# Patient Record
Sex: FEMALE | NOT HISPANIC OR LATINO | Employment: OTHER | URBAN - METROPOLITAN AREA
[De-identification: names, ages, dates, MRNs, and addresses within clinical notes are randomized per-mention and may not be internally consistent; named-entity substitution may affect disease eponyms.]

---

## 2018-12-06 ENCOUNTER — HOSPITAL ENCOUNTER (EMERGENCY)
Facility: HOSPITAL | Age: 63
Discharge: HOME | End: 2018-12-07
Attending: EMERGENCY MEDICINE | Admitting: EMERGENCY MEDICINE
Payer: COMMERCIAL

## 2018-12-06 VITALS
HEIGHT: 64 IN | WEIGHT: 157.41 LBS | SYSTOLIC BLOOD PRESSURE: 131 MMHG | DIASTOLIC BLOOD PRESSURE: 80 MMHG | BODY MASS INDEX: 26.87 KG/M2 | OXYGEN SATURATION: 97 % | TEMPERATURE: 97.9 F | RESPIRATION RATE: 15 BRPM | HEART RATE: 85 BPM

## 2018-12-06 DIAGNOSIS — F25.9 SCHIZOAFFECTIVE DISORDER (HCC): Primary | ICD-10-CM

## 2018-12-06 LAB
AMPHETAMINES SERPL QL SCN: NEGATIVE
BARBITURATES UR QL: NEGATIVE
BENZODIAZ UR QL: NEGATIVE
COCAINE UR QL: NEGATIVE
ETHANOL EXG-MCNC: 0 MG/DL
METHADONE UR QL: NEGATIVE
OPIATES UR QL SCN: NEGATIVE
PCP UR QL: NEGATIVE
THC UR QL: NEGATIVE

## 2018-12-06 PROCEDURE — 82075 ASSAY OF BREATH ETHANOL: CPT | Performed by: EMERGENCY MEDICINE

## 2018-12-06 PROCEDURE — 80307 DRUG TEST PRSMV CHEM ANLYZR: CPT | Performed by: EMERGENCY MEDICINE

## 2018-12-06 PROCEDURE — 99284 EMERGENCY DEPT VISIT MOD MDM: CPT

## 2018-12-06 RX ORDER — LEVALBUTEROL INHALATION SOLUTION 0.63 MG/3ML
0.63 SOLUTION RESPIRATORY (INHALATION) ONCE
Status: COMPLETED | OUTPATIENT
Start: 2018-12-06 | End: 2018-12-06

## 2018-12-06 RX ORDER — LEVOTHYROXINE SODIUM 0.12 MG/1
125 TABLET ORAL DAILY
COMMUNITY
End: 2019-03-21 | Stop reason: SDUPTHER

## 2018-12-06 RX ADMIN — LEVALBUTEROL HYDROCHLORIDE 0.63 MG: 0.63 SOLUTION RESPIRATORY (INHALATION) at 15:55

## 2018-12-06 NOTE — ED NOTES
Pt states she "saw two  that she asked to come with her here as they were polite and friendly but didn't and a red haired lady from "[facility]" were there when she was transported here " she states she has "3, 4 psychological clearances" and her providers "would be willing to come out about this investigator shelby told her so"        Elmira Alberto  12/06/18 0445

## 2018-12-06 NOTE — ED NOTES
Tech assigned for one to one observation for patient safety  I introduced myself and explained my role in the patients care team   Pt appears sitting in bed quietly resting  Pt does not appear in distress at this time  Pt is on 3L oxygen therapy  Pt provided urine specimen  Pt is changed into paper scrubs  Will continue to monitor         Alberta Suero  12/06/18 200 Antony Méndez  12/06/18 9028

## 2018-12-06 NOTE — ED NOTES
Pt requests she have her "breathing treatment and bloodwork while I am here, so that I can stay on track with that"       Valerie Mendoza  12/06/18 2084

## 2018-12-06 NOTE — ED NOTES
Pt is talking extensively about her care team  Pt is frequently referring to "Odessa Thomas and Dr Bhumika Perez " Pt states she "prefers to be called "Katty Bowles" but people do not listen"  States she "has other allies like Bird Viatle" "[employer] deleted I suspect that phone number out of my phone because of not wanting me to talk to her" "it is my right to talk about issues" Pt expresses she prefers to have the curtain kept open           Sandy Duke  12/06/18 3706

## 2018-12-06 NOTE — ED PROVIDER NOTES
History  Chief Complaint   Patient presents with    Psychiatric Evaluation     Pt brought in from [facility], where garrick would like to petition a 302  States pt is wunwilling tot amrita meds, is locking herself in her room with a room mate, making sexual comments towards staff, and is exp severe hallucinations  Pt  Was brought in from [facility], personal care facility on a possible 302  She has a hx of schizoaffective disorder and according to one staff member, she refused to take a medication today and locked herself in the bathroom today     She has not threatened anyone or herself  She is not a harm to herself or others  She denies locking herself in a room  She also says that she questions one pill that they give her because she wasn't supposed to be on it  Prior to Admission Medications   Prescriptions Last Dose Informant Patient Reported? Taking? EPINEPHrine (EPIPEN JR) 0 15 mg/0 3 mL SOAJ   Yes Yes   Sig: Inject 0 15 mg into the shoulder, thigh, or buttocks once as needed for anaphylaxis  As needed for allergic reaction   OXYGEN-HELIUM IN   Yes Yes   Sig: Inhale 3 L  QUEtiapine (SEROquel) 100 mg tablet   Yes Yes   Sig: Take 50 mg by mouth daily at bedtime     acetaminophen 650 MG TABS   No Yes   Sig: Take 1 tablet (650 mg total) by mouth every 6 (six) hours as needed for mild pain  fluticasone-salmeterol (ADVAIR) 500-50 mcg/dose inhaler   Yes Yes   Sig: Inhale 1 puff 2 (two) times a day Rinse mouth after use  levalbuterol (XOPENEX) 1 25 mg/3 mL nebulizer solution   Yes Yes   Sig: Take 1 ampule by nebulization every 6 (six) hours as needed for wheezing  levothyroxine 125 mcg tablet   Yes Yes   Sig: Take 125 mcg by mouth daily   nicotine (NICODERM CQ) 14 mg/24hr TD 24 hr patch   No Yes   Sig: Please use the 14 mg patch daily for 2 weeks then decrease to the 7 mg patch daily for 2 weeks     predniSONE 10 mg tablet   No Yes   Si mg PO Qdaily for 3 days then 20 mg daily for 4 days then 10 mg daily for 4 days then 5 mg daily for 4 days  theophylline (JEF-24) 200 MG 24 hr capsule   Yes Yes   Sig: Take 200 mg by mouth 2 (two) times a day  Facility-Administered Medications: None       Past Medical History:   Diagnosis Date    Anxiety     COPD [facility]chronic obstructive pulmonary disease) ([facility] [address] )     Depression     GERD (gastroesophageal reflux disease)     Schizoaffective disorder ([facility])        History reviewed  No pertinent surgical history  History reviewed  No pertinent family history  I have reviewed and agree with the history as documented  Social History   Substance Use Topics    Smoking status: Current Every Day Smoker    Smokeless tobacco: Never Used    Alcohol use No        Review of Systems   Constitutional: Negative for appetite change, fatigue and fever  HENT: Negative for rhinorrhea and sore throat  Respiratory: Negative for cough, shortness of breath and wheezing  Cardiovascular: Negative for chest pain and leg swelling  Gastrointestinal: Negative for abdominal pain, diarrhea and vomiting  Genitourinary: Negative for dysuria and flank pain  Musculoskeletal: Negative for back pain and neck pain  Skin: Negative for rash  Neurological: Negative for syncope and headaches  Psychiatric/Behavioral:        No SI/HI       Physical Exam  Physical Exam   Constitutional: She is oriented to person, place, and time  She appears well-developed and well-nourished  HENT:   Head: Normocephalic and atraumatic  Neck: Normal range of motion  Neck supple  Cardiovascular: Normal rate and regular rhythm  Pulmonary/Chest: Effort normal and breath sounds normal    Abdominal: Soft  There is no tenderness  Musculoskeletal: Normal range of motion  Neurological: She is alert and oriented to person, place, and time  Skin: Skin is warm and dry  Psychiatric:   No SI/HI   Nursing note and vitals reviewed        Vital Signs  ED Triage Vitals Temperature Pulse Respirations Blood Pressure SpO2   12/06/18 1416 12/06/18 1408 12/06/18 1408 12/06/18 1408 12/06/18 1408   97 9 °F (36 6 °C) 99 20 145/87 96 %      Temp Source Heart Rate Source Patient Position - Orthostatic VS BP Location FiO2 (%)   12/06/18 1416 12/06/18 1408 12/06/18 1408 12/06/18 1408 --   Oral Monitor Lying Right arm       Pain Score       12/06/18 1408       No Pain           Vitals:    12/06/18 1408 12/06/18 1756   BP: 145/87 131/80   Pulse: 99 85   Patient Position - Orthostatic VS: Lying        Visual Acuity      ED Medications  Medications   levalbuterol (XOPENEX) inhalation solution 0 63 mg (0 63 mg Nebulization Given 12/6/18 1555)       Diagnostic Studies  Results Reviewed     Procedure Component Value Units Date/Time    Rapid drug screen, urine [[id number]]  (Normal) Collected:  12/06/18 1511    Lab Status:  Final result Specimen:  Urine from Urine, Other Updated:  12/06/18 1527     Amph/Meth UR Negative     Barbiturate Ur Negative     Benzodiazepine Urine Negative     Cocaine Urine Negative     Methadone Urine Negative     Opiate Urine Negative     PCP Ur Negative     THC Urine Negative    Narrative:         FOR MEDICAL PURPOSES ONLY  IF CONFIRMATION NEEDED PLEASE CONTACT THE LAB WITHIN 5 DAYS      Drug Screen Cutoff Levels:  AMPHETAMINE/METHAMPHETAMINES  1000 ng/mL  BARBITURATES     200 ng/mL  BENZODIAZEPINES     200 ng/mL  COCAINE      300 ng/mL  METHADONE      300 ng/mL  OPIATES      300 ng/mL  PHENCYCLIDINE     25 ng/mL  THC       50 ng/mL    POCT alcohol breath test [[id number]]  (Normal) Resulted:  12/06/18 1432    Lab Status:  Final result Updated:  12/06/18 1432     EXTBreath Alcohol 0 00                 No orders to display              Procedures  Procedures       Phone Contacts  ED Phone Contact    ED Course                               MDM  Number of Diagnoses or Management Options  Schizoaffective disorder [facility]):      Amount and/or Complexity of Data Reviewed  Clinical lab tests: ordered and reviewed    Risk of Complications, Morbidity, and/or Mortality  Presenting problems: moderate  General comments: Denied the 302, pt  Is not a threat to herself or anyone else  CritCare Time    Disposition  Final diagnoses:   Schizoaffective disorder ([facility] [address] )     Time reflects when diagnosis was documented in both MDM as applicable and the Disposition within this note     Time User Action Codes Description Comment    12/6/2018  7:14 PM Karie Chavez 6 [F25 9] Schizoaffective disorder [facility])       ED Disposition     ED Disposition Condition Comment    Discharge  Nolberto Galvin discharge to home/self care  Condition at discharge: Stable        MD Documentation      Most Recent Value   Sending MD Dr Belen Willingham up With Specialties Details Why [address], [address]   [address] [place] [address] [address] [phone]            Patient's Medications   Discharge Prescriptions    No medications on file     No discharge procedures on file      ED Provider  Electronically Signed by           Sharif Islas MD  12/06/18 2491

## 2018-12-06 NOTE — ED NOTES
"they did this on memorial day, I was 302'd sat on a one on one at [address], but I was tired and I do not know the area   It was memorial day I was looking really nice saying my prayers and all of a sudden I was handcuffed by two individuals who were not even police officers driving a silver vehicle" states "it is disturbing to me that they feel I would hurt myself or others " Says it makes her wonder "what are they doing that they need to ask if I am okay?" "i do not like doors closed, it gets very hot" "my roommate will say that unless they are coerced otherwise, I do not like living behind locked doors"           Ricky Honeycutt  12/06/18 1501

## 2018-12-06 NOTE — ED NOTES
Pt is explaining how she believes her care providers are taking her medications, after she refuses them, to give to other people or take for themselves  She believes the care facility is charging her for medication that they are giving to other patients  As a one to one sitter I have not engaged in questioning her beliefs or asking any further details  Statements patient has been making have been entirely unprovoked and of free will       Natalie Moreno Headen  12/06/18 6410

## 2018-12-06 NOTE — ED NOTES
Pt belongings collected and bagged outside of pt room  Pt has jeans, underwear, sneakers, a zipup sweatshirt  She also has portable oxygen and a pink purse (contents not yet examined)  Pt has 2 rings, a bracelet, and 4 earrings, and 2 hair ties  Pt changed to paper scrubs per protocol  Pt BAT  000  Not able to use the restroom at this time, she has a soda        Lencho Shannon  12/06/18 2405

## 2018-12-06 NOTE — ED NOTES
Patient appears sitting in bed talking about how she perceives events in her life  Pt is still eating dinner provided  Pt does not appear in distress at this time  Will continue to monitor       Francesco Milner  12/06/18 8517

## 2018-12-06 NOTE — ED NOTES
Pt continues to explain in detail about people she knows and how she knows them       Kishor Walls  12/06/18 4401

## 2018-12-06 NOTE — ED NOTES
Pt ambulated to and from bathroom, with a steady gate, unassisted  Pt is now laying quietly resting in bed  Pt does not appear in distress at this time  Will continue to monitor         Jeannette Ramirez  12/06/18 3038

## 2018-12-07 NOTE — ED NOTES
[facility] called to say they will be in the ED to  pt around 1:00 AM due to another emergency transport

## 2018-12-07 NOTE — ED NOTES
Pt is sitting in a chair next to the bed  Patient was given fresh water and is sitting, resting  Pt is on 3L oxygen therapy and does not appear to be in distress at this time  Will continue to monitor       Cyril Hayes  12/06/18 6544

## 2018-12-07 NOTE — ED NOTES
Pt presents to the ED on a 302 petitioned by a nurse at her personal care home stating that pt is refusing to take her meds, is paranoid and is responding to internal stimuli  Pt denies any suicidal or homicidal ideations, nor any auditory or visual hallucinations at this time  Pt presents in bright spirits, alert and oriented and able to answer all questions appropriately  Pt states that she does take Seroquel and klonopin as prescribed and is O2 dependent, but is refusing to take a new med given to her that she believes is Prozac because she feels it is a respiratory depressant and her CRNP, Juan Daniel Pabon did not discuss giving her this new med  Pt reports good sleep and a healthy appetite  She reports getting along well with staff and residents alike  Pt reports 1 previous hospitalization at [facility] a few yrs ago but has no current psychiatry or therapy Tx  Pt notes in response to comments in the 302 petition about her talking out loud, that she does often talk along with the TV when game shows are on  Pt did state that she had some surgery in 1997 and at that time a tracker of some sort was implanted into her right forearm  However, she declined to elaborate  CW then spoke with Orville Gomez, the nurse who petitioned the [phone], and he repeated that he believes pt needs hospitalization due to her alleged refusal to take her meds  Orville Gomez did not discuss any other items noted in the 302 petition  He did state that pt can return to the home but they will not provide transportation  CW discussed this case with Dr Lenard Rico who is in agreement with denying the [phone] petition and D/C'ing pt home

## 2018-12-07 NOTE — DISCHARGE INSTRUCTIONS
Psychotic Disorder   WHAT YOU NEED TO KNOW:   A psychotic disorder is a medical condition that causes hallucinations and delusions  Hallucinations are seeing, hearing, tasting, or feeling things that are not real  Delusions are beliefs that something is real, true, or right when it is not  These false beliefs do not go away even if there is proof that they are not true  You may believe someone is spying on you, after you, or controlling your mind  You may also believe there is something wrong with how your body works  Schizophrenia and schizoaffective disorder are examples of psychotic disorders  DISCHARGE INSTRUCTIONS:   Call 911 if:   · You feel like you could harm yourself or someone else  Contact your healthcare provider if:   · Your symptoms do not improve  · You cannot make it to your next appointment  · You have new symptoms  · You have questions or concerns about your condition or care  Medicines  may be given to decrease your symptoms  You may need 1 or more medicines  You may need to take your medicine for several weeks before you begin to feel better  Tell your healthcare provider about any side effects or problems you have with your medicines  The type or amount of medicine may need to be changed  Get support: It may be difficult to cope with your illness  You may feel lonely, anxious, or depressed  It may help to join a support group  A support group lets you talk with others who have a mental illness  For information and more support visit:  · [facility]  [address] , [address] [place] [address]  Phone: [phone]  Phone: [phone]  Web Address: http://L[employer]oodle/  Sitemasher  Self-care:   · Do not drink alcohol or use illegal drugs  Alcohol and illegal drugs can make your symptoms worse  Ask your healthcare provider for information if you currently drink alcohol or use illegal drugs and need help to quit  · Do not smoke    Nicotine and other chemicals in cigarettes and cigars can cause lung damage  They can also decrease how well your medicine works  Ask your healthcare provider for information if you currently smoke and need help to quit  E-cigarettes or smokeless tobacco still contain nicotine  Talk to your healthcare provider before you use these products  · Exercise regularly  Exercise can help improve your mood and decrease symptoms  Ask about the best exercise plan for you  · Manage your stress  Stress can make your condition worse  Ask your healthcare provider for more information about practicing mindfulness and deep breathing exercises to help decrease your stress  You may learn other ways to manage stress during therapy  Follow up with your healthcare provider as directed:  Write down your questions so you remember to ask them during your visits  © 2017 [facility] Information is for End User's use only and may not be sold, redistributed or otherwise used for commercial purposes  All illustrations and images included in CareNotes® are the copyrighted property of [employer]  or Johnathan Gomez  The above information is an  only  It is not intended as medical advice for individual conditions or treatments  Talk to your doctor, nurse or pharmacist before following any medical regimen to see if it is safe and effective for you

## 2018-12-07 NOTE — ED NOTES
On a 1:1 with patient, received report from [facility]  Pt is showing no signs of distress, will continue to monitor        Nel Calles  12/06/18 2042

## 2019-01-01 ENCOUNTER — APPOINTMENT (INPATIENT)
Dept: CT IMAGING | Facility: HOSPITAL | Age: 64
DRG: 750 | End: 2019-01-01
Payer: COMMERCIAL

## 2019-01-01 ENCOUNTER — APPOINTMENT (INPATIENT)
Dept: RADIOLOGY | Facility: HOSPITAL | Age: 64
DRG: 750 | End: 2019-01-01
Payer: COMMERCIAL

## 2019-01-01 LAB
ATRIAL RATE: 84 BPM
ATRIAL RATE: 90 BPM
BASOPHILS # BLD AUTO: 0.1 THOUSANDS/ΜL (ref 0–0.1)
BASOPHILS NFR BLD AUTO: 1 % (ref 0–1)
BASOPHILS NFR BLD AUTO: 2 % (ref 0–1)
EOSINOPHIL # BLD AUTO: 0.1 THOUSAND/ΜL (ref 0–0.4)
EOSINOPHIL # BLD AUTO: 0.2 THOUSAND/ΜL (ref 0–0.4)
EOSINOPHIL # BLD AUTO: 0.2 THOUSAND/ΜL (ref 0–0.4)
EOSINOPHIL NFR BLD AUTO: 2 % (ref 0–6)
ERYTHROCYTE [DISTWIDTH] IN BLOOD BY AUTOMATED COUNT: 13.6 %
ERYTHROCYTE [DISTWIDTH] IN BLOOD BY AUTOMATED COUNT: 13.7 %
ERYTHROCYTE [DISTWIDTH] IN BLOOD BY AUTOMATED COUNT: 13.7 %
ERYTHROCYTE [DISTWIDTH] IN BLOOD BY AUTOMATED COUNT: 13.9 %
ERYTHROCYTE [DISTWIDTH] IN BLOOD BY AUTOMATED COUNT: 14.1 %
ERYTHROCYTE [DISTWIDTH] IN BLOOD BY AUTOMATED COUNT: 14.2 %
ERYTHROCYTE [DISTWIDTH] IN BLOOD BY AUTOMATED COUNT: 14.3 %
ERYTHROCYTE [DISTWIDTH] IN BLOOD BY AUTOMATED COUNT: 14.5 %
GLUCOSE SERPL-MCNC: 114 MG/DL (ref 65–140)
GLUCOSE SERPL-MCNC: 142 MG/DL (ref 65–140)
GLUCOSE SERPL-MCNC: 78 MG/DL (ref 65–140)
HCT VFR BLD AUTO: 39.7 % (ref 36–46)
HCT VFR BLD AUTO: 41.3 % (ref 36–46)
HCT VFR BLD AUTO: 42.1 % (ref 36–46)
HCT VFR BLD AUTO: 43.3 % (ref 36–46)
HCT VFR BLD AUTO: 43.6 % (ref 36–46)
HCT VFR BLD AUTO: 44.6 % (ref 36–46)
HCT VFR BLD AUTO: 45.5 % (ref 36–46)
HCT VFR BLD AUTO: 46.8 % (ref 36–46)
HGB BLD-MCNC: 13.1 G/DL (ref 12–16)
HGB BLD-MCNC: 13.7 G/DL (ref 12–16)
HGB BLD-MCNC: 13.9 G/DL (ref 12–16)
HGB BLD-MCNC: 14.4 G/DL (ref 12–16)
HGB BLD-MCNC: 14.5 G/DL (ref 12–16)
HGB BLD-MCNC: 14.8 G/DL (ref 12–16)
HGB BLD-MCNC: 15.1 G/DL (ref 12–16)
HGB BLD-MCNC: 15.2 G/DL (ref 12–16)
LYMPHOCYTES # BLD AUTO: 2.2 THOUSANDS/ΜL (ref 0.5–4)
LYMPHOCYTES # BLD AUTO: 2.3 THOUSANDS/ΜL (ref 0.5–4)
LYMPHOCYTES # BLD AUTO: 2.5 THOUSANDS/ΜL (ref 0.5–4)
LYMPHOCYTES # BLD AUTO: 2.8 THOUSANDS/ΜL (ref 0.5–4)
LYMPHOCYTES # BLD AUTO: 2.8 THOUSANDS/ΜL (ref 0.5–4)
LYMPHOCYTES # BLD AUTO: 2.9 THOUSANDS/ΜL (ref 0.5–4)
LYMPHOCYTES NFR BLD AUTO: 28 % (ref 25–45)
LYMPHOCYTES NFR BLD AUTO: 31 % (ref 25–45)
LYMPHOCYTES NFR BLD AUTO: 31 % (ref 25–45)
LYMPHOCYTES NFR BLD AUTO: 34 % (ref 25–45)
LYMPHOCYTES NFR BLD AUTO: 37 % (ref 25–45)
LYMPHOCYTES NFR BLD AUTO: 38 % (ref 25–45)
LYMPHOCYTES NFR BLD AUTO: 39 % (ref 25–45)
LYMPHOCYTES NFR BLD AUTO: 40 % (ref 25–45)
MCH RBC QN AUTO: 29.5 PG (ref 26–34)
MCH RBC QN AUTO: 30 PG (ref 26–34)
MCH RBC QN AUTO: 30.1 PG (ref 26–34)
MCH RBC QN AUTO: 30.3 PG (ref 26–34)
MCH RBC QN AUTO: 30.4 PG (ref 26–34)
MCH RBC QN AUTO: 30.4 PG (ref 26–34)
MCH RBC QN AUTO: 30.7 PG (ref 26–34)
MCH RBC QN AUTO: 30.9 PG (ref 26–34)
MCHC RBC AUTO-ENTMCNC: 32.4 G/DL (ref 31–36)
MCHC RBC AUTO-ENTMCNC: 32.6 G/DL (ref 31–36)
MCHC RBC AUTO-ENTMCNC: 32.9 G/DL (ref 31–36)
MCHC RBC AUTO-ENTMCNC: 33.1 G/DL (ref 31–36)
MCHC RBC AUTO-ENTMCNC: 33.2 G/DL (ref 31–36)
MCHC RBC AUTO-ENTMCNC: 33.2 G/DL (ref 31–36)
MCHC RBC AUTO-ENTMCNC: 33.5 G/DL (ref 31–36)
MCHC RBC AUTO-ENTMCNC: 33.6 G/DL (ref 31–36)
MCV RBC AUTO: 90 FL (ref 80–100)
MCV RBC AUTO: 91 FL (ref 80–100)
MCV RBC AUTO: 91 FL (ref 80–100)
MCV RBC AUTO: 92 FL (ref 80–100)
MCV RBC AUTO: 93 FL (ref 80–100)
MONOCYTES # BLD AUTO: 0.6 THOUSAND/ΜL (ref 0.2–0.9)
MONOCYTES # BLD AUTO: 0.6 THOUSAND/ΜL (ref 0.2–0.9)
MONOCYTES # BLD AUTO: 0.7 THOUSAND/ΜL (ref 0.2–0.9)
MONOCYTES # BLD AUTO: 0.8 THOUSAND/ΜL (ref 0.2–0.9)
MONOCYTES # BLD AUTO: 0.8 THOUSAND/ΜL (ref 0.2–0.9)
MONOCYTES # BLD AUTO: 0.9 THOUSAND/ΜL (ref 0.2–0.9)
MONOCYTES NFR BLD AUTO: 10 % (ref 1–10)
MONOCYTES NFR BLD AUTO: 11 % (ref 1–10)
MONOCYTES NFR BLD AUTO: 8 % (ref 1–10)
MONOCYTES NFR BLD AUTO: 9 % (ref 1–10)
MONOCYTES NFR BLD AUTO: 9 % (ref 1–10)
NEUTROPHILS # BLD AUTO: 3.2 THOUSANDS/ΜL (ref 1.8–7.8)
NEUTROPHILS # BLD AUTO: 3.3 THOUSANDS/ΜL (ref 1.8–7.8)
NEUTROPHILS # BLD AUTO: 3.4 THOUSANDS/ΜL (ref 1.8–7.8)
NEUTROPHILS # BLD AUTO: 3.5 THOUSANDS/ΜL (ref 1.8–7.8)
NEUTROPHILS # BLD AUTO: 3.7 THOUSANDS/ΜL (ref 1.8–7.8)
NEUTROPHILS # BLD AUTO: 3.8 THOUSANDS/ΜL (ref 1.8–7.8)
NEUTROPHILS # BLD AUTO: 4.3 THOUSANDS/ΜL (ref 1.8–7.8)
NEUTROPHILS # BLD AUTO: 5.3 THOUSANDS/ΜL (ref 1.8–7.8)
NEUTS SEG NFR BLD AUTO: 47 % (ref 45–65)
NEUTS SEG NFR BLD AUTO: 48 % (ref 45–65)
NEUTS SEG NFR BLD AUTO: 50 % (ref 45–65)
NEUTS SEG NFR BLD AUTO: 52 % (ref 45–65)
NEUTS SEG NFR BLD AUTO: 53 % (ref 45–65)
NEUTS SEG NFR BLD AUTO: 55 % (ref 45–65)
NEUTS SEG NFR BLD AUTO: 57 % (ref 45–65)
NEUTS SEG NFR BLD AUTO: 58 % (ref 45–65)
P AXIS: 78 DEGREES
P AXIS: 85 DEGREES
PLATELET # BLD AUTO: 241 THOUSANDS/UL (ref 150–450)
PLATELET # BLD AUTO: 250 THOUSANDS/UL (ref 150–450)
PLATELET # BLD AUTO: 252 THOUSANDS/UL (ref 150–450)
PLATELET # BLD AUTO: 255 THOUSANDS/UL (ref 150–450)
PLATELET # BLD AUTO: 259 THOUSANDS/UL (ref 150–450)
PLATELET # BLD AUTO: 282 THOUSANDS/UL (ref 150–450)
PLATELET # BLD AUTO: 305 THOUSANDS/UL (ref 150–450)
PLATELET # BLD AUTO: 313 THOUSANDS/UL (ref 150–450)
PMV BLD AUTO: 10 FL (ref 8.9–12.7)
PMV BLD AUTO: 10.1 FL (ref 8.9–12.7)
PMV BLD AUTO: 10.1 FL (ref 8.9–12.7)
PMV BLD AUTO: 10.3 FL (ref 8.9–12.7)
PMV BLD AUTO: 9.1 FL (ref 8.9–12.7)
PMV BLD AUTO: 9.2 FL (ref 8.9–12.7)
PR INTERVAL: 150 MS
PR INTERVAL: 158 MS
QRS AXIS: -59 DEGREES
QRS AXIS: -60 DEGREES
QRSD INTERVAL: 88 MS
QRSD INTERVAL: 92 MS
QT INTERVAL: 382 MS
QT INTERVAL: 408 MS
QTC INTERVAL: 467 MS
QTC INTERVAL: 482 MS
RBC # BLD AUTO: 4.32 MILLION/UL (ref 4–5.2)
RBC # BLD AUTO: 4.51 MILLION/UL (ref 4–5.2)
RBC # BLD AUTO: 4.66 MILLION/UL (ref 4–5.2)
RBC # BLD AUTO: 4.68 MILLION/UL (ref 4–5.2)
RBC # BLD AUTO: 4.75 MILLION/UL (ref 4–5.2)
RBC # BLD AUTO: 4.87 MILLION/UL (ref 4–5.2)
RBC # BLD AUTO: 5.01 MILLION/UL (ref 4–5.2)
RBC # BLD AUTO: 5.07 MILLION/UL (ref 4–5.2)
T WAVE AXIS: 53 DEGREES
T WAVE AXIS: 59 DEGREES
VENTRICULAR RATE: 84 BPM
VENTRICULAR RATE: 90 BPM
WBC # BLD AUTO: 6.3 THOUSAND/UL (ref 4.5–11)
WBC # BLD AUTO: 6.5 THOUSAND/UL (ref 4.5–11)
WBC # BLD AUTO: 6.6 THOUSAND/UL (ref 4.5–11)
WBC # BLD AUTO: 6.9 THOUSAND/UL (ref 4.5–11)
WBC # BLD AUTO: 7.2 THOUSAND/UL (ref 4.5–11)
WBC # BLD AUTO: 7.4 THOUSAND/UL (ref 4.5–11)
WBC # BLD AUTO: 7.6 THOUSAND/UL (ref 4.5–11)
WBC # BLD AUTO: 9.1 THOUSAND/UL (ref 4.5–11)

## 2019-01-01 PROCEDURE — 99231 SBSQ HOSP IP/OBS SF/LOW 25: CPT | Performed by: PSYCHIATRY & NEUROLOGY

## 2019-01-01 PROCEDURE — 99232 SBSQ HOSP IP/OBS MODERATE 35: CPT | Performed by: NURSE PRACTITIONER

## 2019-01-01 PROCEDURE — 82948 REAGENT STRIP/BLOOD GLUCOSE: CPT

## 2019-01-01 PROCEDURE — 99232 SBSQ HOSP IP/OBS MODERATE 35: CPT | Performed by: FAMILY MEDICINE

## 2019-01-01 PROCEDURE — 85025 COMPLETE CBC W/AUTO DIFF WBC: CPT | Performed by: PSYCHIATRY & NEUROLOGY

## 2019-01-01 PROCEDURE — 71250 CT THORAX DX C-: CPT

## 2019-01-01 PROCEDURE — 99232 SBSQ HOSP IP/OBS MODERATE 35: CPT | Performed by: PHYSICIAN ASSISTANT

## 2019-01-01 PROCEDURE — 99231 SBSQ HOSP IP/OBS SF/LOW 25: CPT | Performed by: PHYSICIAN ASSISTANT

## 2019-01-01 PROCEDURE — 93005 ELECTROCARDIOGRAM TRACING: CPT

## 2019-01-01 PROCEDURE — 93010 ELECTROCARDIOGRAM REPORT: CPT | Performed by: INTERNAL MEDICINE

## 2019-01-01 PROCEDURE — 99232 SBSQ HOSP IP/OBS MODERATE 35: CPT | Performed by: INTERNAL MEDICINE

## 2019-01-01 PROCEDURE — 99222 1ST HOSP IP/OBS MODERATE 55: CPT | Performed by: INTERNAL MEDICINE

## 2019-01-01 PROCEDURE — 99254 IP/OBS CNSLTJ NEW/EST MOD 60: CPT | Performed by: SPECIALIST

## 2019-01-01 PROCEDURE — 99231 SBSQ HOSP IP/OBS SF/LOW 25: CPT | Performed by: SPECIALIST

## 2019-01-01 PROCEDURE — 99232 SBSQ HOSP IP/OBS MODERATE 35: CPT | Performed by: PSYCHIATRY & NEUROLOGY

## 2019-01-01 PROCEDURE — 71046 X-RAY EXAM CHEST 2 VIEWS: CPT

## 2019-01-01 PROCEDURE — NC001 PR NO CHARGE: Performed by: SPECIALIST

## 2019-01-01 RX ORDER — SUCRALFATE ORAL 1 G/10ML
1000 SUSPENSION ORAL 2 TIMES DAILY
Status: DISCONTINUED | OUTPATIENT
Start: 2019-01-01 | End: 2020-01-01 | Stop reason: HOSPADM

## 2019-01-01 RX ADMIN — THEOPHYLLINE ANHYDROUS 200 MG: 200 CAPSULE, EXTENDED RELEASE ORAL at 08:42

## 2019-01-01 RX ADMIN — CLOZAPINE 25 MG: 25 TABLET ORAL at 21:30

## 2019-01-01 RX ADMIN — THEOPHYLLINE ANHYDROUS 200 MG: 200 CAPSULE, EXTENDED RELEASE ORAL at 08:49

## 2019-01-01 RX ADMIN — SUCRALFATE 1000 MG: 1 SUSPENSION ORAL at 06:03

## 2019-01-01 RX ADMIN — CLOZAPINE 25 MG: 25 TABLET ORAL at 21:00

## 2019-01-01 RX ADMIN — SERTRALINE HYDROCHLORIDE 50 MG: 50 TABLET ORAL at 09:04

## 2019-01-01 RX ADMIN — SERTRALINE HYDROCHLORIDE 50 MG: 50 TABLET ORAL at 08:45

## 2019-01-01 RX ADMIN — FLUTICASONE FUROATE AND VILANTEROL TRIFENATATE 1 PUFF: 200; 25 POWDER RESPIRATORY (INHALATION) at 08:43

## 2019-01-01 RX ADMIN — LEVOTHYROXINE SODIUM 125 MCG: 125 TABLET ORAL at 05:55

## 2019-01-01 RX ADMIN — SUCRALFATE 1000 MG: 1 SUSPENSION ORAL at 06:10

## 2019-01-01 RX ADMIN — FLUTICASONE FUROATE AND VILANTEROL TRIFENATATE 1 PUFF: 200; 25 POWDER RESPIRATORY (INHALATION) at 08:58

## 2019-01-01 RX ADMIN — CLOZAPINE 50 MG: 25 TABLET ORAL at 21:11

## 2019-01-01 RX ADMIN — PANTOPRAZOLE SODIUM 40 MG: 40 TABLET, DELAYED RELEASE ORAL at 06:04

## 2019-01-01 RX ADMIN — SUCRALFATE 1000 MG: 1 SUSPENSION ORAL at 06:05

## 2019-01-01 RX ADMIN — CLOZAPINE 25 MG: 25 TABLET ORAL at 17:48

## 2019-01-01 RX ADMIN — CLOZAPINE 25 MG: 25 TABLET ORAL at 21:20

## 2019-01-01 RX ADMIN — CLOZAPINE 50 MG: 25 TABLET ORAL at 18:04

## 2019-01-01 RX ADMIN — LEVOTHYROXINE SODIUM 125 MCG: 125 TABLET ORAL at 06:04

## 2019-01-01 RX ADMIN — SUCRALFATE 1000 MG: 1 SUSPENSION ORAL at 16:08

## 2019-01-01 RX ADMIN — TIOTROPIUM BROMIDE 18 MCG: 18 CAPSULE ORAL; RESPIRATORY (INHALATION) at 08:43

## 2019-01-01 RX ADMIN — SERTRALINE HYDROCHLORIDE 50 MG: 50 TABLET ORAL at 21:48

## 2019-01-01 RX ADMIN — SUCRALFATE 1000 MG: 1 SUSPENSION ORAL at 06:01

## 2019-01-01 RX ADMIN — SUCRALFATE 1000 MG: 1 SUSPENSION ORAL at 06:36

## 2019-01-01 RX ADMIN — SUCRALFATE 1000 MG: 1 SUSPENSION ORAL at 16:00

## 2019-01-01 RX ADMIN — SUCRALFATE 1000 MG: 1 SUSPENSION ORAL at 06:00

## 2019-01-01 RX ADMIN — CARBAMIDE PEROXIDE 6.5% 5 DROP: 6.5 LIQUID AURICULAR (OTIC) at 21:29

## 2019-01-01 RX ADMIN — CLOZAPINE 50 MG: 25 TABLET ORAL at 17:58

## 2019-01-01 RX ADMIN — CARBAMIDE PEROXIDE 6.5% 5 DROP: 6.5 LIQUID AURICULAR (OTIC) at 08:19

## 2019-01-01 RX ADMIN — PANTOPRAZOLE SODIUM 40 MG: 40 TABLET, DELAYED RELEASE ORAL at 06:11

## 2019-01-01 RX ADMIN — THEOPHYLLINE ANHYDROUS 200 MG: 200 CAPSULE, EXTENDED RELEASE ORAL at 09:01

## 2019-01-01 RX ADMIN — THEOPHYLLINE ANHYDROUS 200 MG: 200 CAPSULE, EXTENDED RELEASE ORAL at 08:53

## 2019-01-01 RX ADMIN — LEVOTHYROXINE SODIUM 125 MCG: 125 TABLET ORAL at 05:50

## 2019-01-01 RX ADMIN — SERTRALINE HYDROCHLORIDE 50 MG: 50 TABLET ORAL at 21:26

## 2019-01-01 RX ADMIN — SERTRALINE HYDROCHLORIDE 50 MG: 50 TABLET ORAL at 09:03

## 2019-01-01 RX ADMIN — CLOZAPINE 50 MG: 25 TABLET ORAL at 21:37

## 2019-01-01 RX ADMIN — SUCRALFATE 1000 MG: 1 SUSPENSION ORAL at 16:46

## 2019-01-01 RX ADMIN — CARBAMIDE PEROXIDE 6.5% 5 DROP: 6.5 LIQUID AURICULAR (OTIC) at 18:00

## 2019-01-01 RX ADMIN — THEOPHYLLINE ANHYDROUS 200 MG: 200 CAPSULE, EXTENDED RELEASE ORAL at 08:56

## 2019-01-01 RX ADMIN — SUCRALFATE 1000 MG: 1 SUSPENSION ORAL at 15:56

## 2019-01-01 RX ADMIN — TIOTROPIUM BROMIDE 18 MCG: 18 CAPSULE ORAL; RESPIRATORY (INHALATION) at 09:04

## 2019-01-01 RX ADMIN — CLOZAPINE 25 MG: 25 TABLET ORAL at 18:14

## 2019-01-01 RX ADMIN — THEOPHYLLINE ANHYDROUS 200 MG: 200 CAPSULE, EXTENDED RELEASE ORAL at 08:30

## 2019-01-01 RX ADMIN — FLUTICASONE FUROATE AND VILANTEROL TRIFENATATE 1 PUFF: 200; 25 POWDER RESPIRATORY (INHALATION) at 08:29

## 2019-01-01 RX ADMIN — FLUTICASONE FUROATE AND VILANTEROL TRIFENATATE 1 PUFF: 200; 25 POWDER RESPIRATORY (INHALATION) at 08:47

## 2019-01-01 RX ADMIN — SERTRALINE HYDROCHLORIDE 50 MG: 50 TABLET ORAL at 08:49

## 2019-01-01 RX ADMIN — CLOZAPINE 25 MG: 25 TABLET ORAL at 16:40

## 2019-01-01 RX ADMIN — FLUTICASONE FUROATE AND VILANTEROL TRIFENATATE 1 PUFF: 200; 25 POWDER RESPIRATORY (INHALATION) at 08:20

## 2019-01-01 RX ADMIN — CLOZAPINE 25 MG: 25 TABLET ORAL at 16:39

## 2019-01-01 RX ADMIN — SERTRALINE HYDROCHLORIDE 50 MG: 50 TABLET ORAL at 21:21

## 2019-01-01 RX ADMIN — SERTRALINE HYDROCHLORIDE 50 MG: 50 TABLET ORAL at 08:55

## 2019-01-01 RX ADMIN — CLOZAPINE 25 MG: 25 TABLET ORAL at 17:53

## 2019-01-01 RX ADMIN — LEVOTHYROXINE SODIUM 125 MCG: 125 TABLET ORAL at 06:03

## 2019-01-01 RX ADMIN — SERTRALINE HYDROCHLORIDE 50 MG: 50 TABLET ORAL at 08:26

## 2019-01-01 RX ADMIN — SUCRALFATE 1000 MG: 1 SUSPENSION ORAL at 15:52

## 2019-01-01 RX ADMIN — CLOZAPINE 25 MG: 25 TABLET ORAL at 21:28

## 2019-01-01 RX ADMIN — SUCRALFATE 1000 MG: 1 SUSPENSION ORAL at 16:09

## 2019-01-01 RX ADMIN — LEVOTHYROXINE SODIUM 125 MCG: 125 TABLET ORAL at 06:02

## 2019-01-01 RX ADMIN — CLOZAPINE 50 MG: 25 TABLET ORAL at 16:56

## 2019-01-01 RX ADMIN — CLOZAPINE 50 MG: 25 TABLET ORAL at 17:20

## 2019-01-01 RX ADMIN — CLOZAPINE 25 MG: 25 TABLET ORAL at 17:55

## 2019-01-01 RX ADMIN — TIOTROPIUM BROMIDE 18 MCG: 18 CAPSULE ORAL; RESPIRATORY (INHALATION) at 08:34

## 2019-01-01 RX ADMIN — THEOPHYLLINE ANHYDROUS 200 MG: 200 CAPSULE, EXTENDED RELEASE ORAL at 09:12

## 2019-01-01 RX ADMIN — CLOZAPINE 50 MG: 25 TABLET ORAL at 21:49

## 2019-01-01 RX ADMIN — TIOTROPIUM BROMIDE 18 MCG: 18 CAPSULE ORAL; RESPIRATORY (INHALATION) at 08:58

## 2019-01-01 RX ADMIN — PANTOPRAZOLE SODIUM 40 MG: 40 TABLET, DELAYED RELEASE ORAL at 06:05

## 2019-01-01 RX ADMIN — SERTRALINE HYDROCHLORIDE 50 MG: 50 TABLET ORAL at 08:43

## 2019-01-01 RX ADMIN — CLOZAPINE 50 MG: 25 TABLET ORAL at 16:58

## 2019-01-01 RX ADMIN — LEVOTHYROXINE SODIUM 125 MCG: 125 TABLET ORAL at 06:26

## 2019-01-01 RX ADMIN — SERTRALINE HYDROCHLORIDE 50 MG: 50 TABLET ORAL at 08:56

## 2019-01-01 RX ADMIN — CLOZAPINE 50 MG: 25 TABLET ORAL at 16:12

## 2019-01-01 RX ADMIN — SUCRALFATE 1000 MG: 1 SUSPENSION ORAL at 16:40

## 2019-01-01 RX ADMIN — TIOTROPIUM BROMIDE 18 MCG: 18 CAPSULE ORAL; RESPIRATORY (INHALATION) at 09:03

## 2019-01-01 RX ADMIN — CLOZAPINE 50 MG: 25 TABLET ORAL at 17:06

## 2019-01-01 RX ADMIN — PANTOPRAZOLE SODIUM 40 MG: 40 TABLET, DELAYED RELEASE ORAL at 06:09

## 2019-01-01 RX ADMIN — CLOZAPINE 25 MG: 25 TABLET ORAL at 17:34

## 2019-01-01 RX ADMIN — FLUTICASONE FUROATE AND VILANTEROL TRIFENATATE 1 PUFF: 200; 25 POWDER RESPIRATORY (INHALATION) at 09:00

## 2019-01-01 RX ADMIN — FLUTICASONE FUROATE AND VILANTEROL TRIFENATATE 1 PUFF: 200; 25 POWDER RESPIRATORY (INHALATION) at 08:51

## 2019-01-01 RX ADMIN — CLOZAPINE 50 MG: 25 TABLET ORAL at 16:40

## 2019-01-01 RX ADMIN — CLOZAPINE 25 MG: 25 TABLET ORAL at 16:57

## 2019-01-01 RX ADMIN — LEVOTHYROXINE SODIUM 125 MCG: 125 TABLET ORAL at 06:01

## 2019-01-01 RX ADMIN — CLOZAPINE 50 MG: 25 TABLET ORAL at 16:27

## 2019-01-01 RX ADMIN — FLUTICASONE FUROATE AND VILANTEROL TRIFENATATE 1 PUFF: 200; 25 POWDER RESPIRATORY (INHALATION) at 08:45

## 2019-01-01 RX ADMIN — CLOZAPINE 25 MG: 25 TABLET ORAL at 21:27

## 2019-01-01 RX ADMIN — CLOZAPINE 50 MG: 25 TABLET ORAL at 17:22

## 2019-01-01 RX ADMIN — CLOZAPINE 50 MG: 25 TABLET ORAL at 17:54

## 2019-01-01 RX ADMIN — SUCRALFATE 1000 MG: 1 SUSPENSION ORAL at 16:01

## 2019-01-01 RX ADMIN — FLUTICASONE FUROATE AND VILANTEROL TRIFENATATE 1 PUFF: 200; 25 POWDER RESPIRATORY (INHALATION) at 08:42

## 2019-01-01 RX ADMIN — PANTOPRAZOLE SODIUM 40 MG: 40 TABLET, DELAYED RELEASE ORAL at 06:02

## 2019-01-01 RX ADMIN — SERTRALINE HYDROCHLORIDE 50 MG: 50 TABLET ORAL at 08:39

## 2019-01-01 RX ADMIN — SERTRALINE HYDROCHLORIDE 50 MG: 50 TABLET ORAL at 20:57

## 2019-01-01 RX ADMIN — CLOZAPINE 25 MG: 25 TABLET ORAL at 17:14

## 2019-01-01 RX ADMIN — CLOZAPINE 50 MG: 25 TABLET ORAL at 16:50

## 2019-01-01 RX ADMIN — CLOZAPINE 50 MG: 25 TABLET ORAL at 21:18

## 2019-01-01 RX ADMIN — SERTRALINE HYDROCHLORIDE 50 MG: 50 TABLET ORAL at 21:28

## 2019-01-01 RX ADMIN — CLOZAPINE 25 MG: 25 TABLET ORAL at 17:00

## 2019-01-01 RX ADMIN — FLUTICASONE FUROATE AND VILANTEROL TRIFENATATE 1 PUFF: 200; 25 POWDER RESPIRATORY (INHALATION) at 08:55

## 2019-01-01 RX ADMIN — SERTRALINE HYDROCHLORIDE 50 MG: 50 TABLET ORAL at 21:53

## 2019-01-01 RX ADMIN — CLOZAPINE 50 MG: 25 TABLET ORAL at 21:30

## 2019-01-01 RX ADMIN — LEVOTHYROXINE SODIUM 125 MCG: 125 TABLET ORAL at 06:10

## 2019-01-01 RX ADMIN — CLOZAPINE 50 MG: 25 TABLET ORAL at 20:32

## 2019-01-01 RX ADMIN — CLOZAPINE 25 MG: 25 TABLET ORAL at 21:19

## 2019-01-01 RX ADMIN — SUCRALFATE 1000 MG: 1 SUSPENSION ORAL at 16:28

## 2019-01-01 RX ADMIN — PANTOPRAZOLE SODIUM 40 MG: 40 TABLET, DELAYED RELEASE ORAL at 06:26

## 2019-01-01 RX ADMIN — TIOTROPIUM BROMIDE 18 MCG: 18 CAPSULE ORAL; RESPIRATORY (INHALATION) at 08:51

## 2019-01-01 RX ADMIN — SERTRALINE HYDROCHLORIDE 50 MG: 50 TABLET ORAL at 08:59

## 2019-01-01 RX ADMIN — SUCRALFATE 1000 MG: 1 SUSPENSION ORAL at 06:04

## 2019-01-01 RX ADMIN — CLOZAPINE 25 MG: 25 TABLET ORAL at 21:47

## 2019-01-01 RX ADMIN — TIOTROPIUM BROMIDE 18 MCG: 18 CAPSULE ORAL; RESPIRATORY (INHALATION) at 08:26

## 2019-01-01 RX ADMIN — PANTOPRAZOLE SODIUM 40 MG: 40 TABLET, DELAYED RELEASE ORAL at 06:06

## 2019-01-01 RX ADMIN — CLOZAPINE 25 MG: 25 TABLET ORAL at 21:24

## 2019-01-01 RX ADMIN — SUCRALFATE 1000 MG: 1 SUSPENSION ORAL at 16:39

## 2019-01-01 RX ADMIN — CLOZAPINE 50 MG: 25 TABLET ORAL at 17:04

## 2019-01-01 RX ADMIN — TIOTROPIUM BROMIDE 18 MCG: 18 CAPSULE ORAL; RESPIRATORY (INHALATION) at 08:44

## 2019-01-01 RX ADMIN — SUCRALFATE 1000 MG: 1 SUSPENSION ORAL at 06:19

## 2019-01-01 RX ADMIN — CLOZAPINE 50 MG: 25 TABLET ORAL at 17:07

## 2019-01-01 RX ADMIN — CLOZAPINE 50 MG: 25 TABLET ORAL at 21:46

## 2019-01-01 RX ADMIN — CLOZAPINE 50 MG: 25 TABLET ORAL at 21:32

## 2019-01-01 RX ADMIN — CLOZAPINE 50 MG: 25 TABLET ORAL at 17:09

## 2019-01-01 RX ADMIN — CARBAMIDE PEROXIDE 6.5% 5 DROP: 6.5 LIQUID AURICULAR (OTIC) at 08:40

## 2019-01-01 RX ADMIN — CLOZAPINE 25 MG: 25 TABLET ORAL at 21:34

## 2019-01-01 RX ADMIN — SERTRALINE HYDROCHLORIDE 50 MG: 50 TABLET ORAL at 09:15

## 2019-01-01 RX ADMIN — LEVOTHYROXINE SODIUM 125 MCG: 125 TABLET ORAL at 06:05

## 2019-01-01 RX ADMIN — SERTRALINE HYDROCHLORIDE 50 MG: 50 TABLET ORAL at 08:53

## 2019-01-01 RX ADMIN — SERTRALINE HYDROCHLORIDE 50 MG: 50 TABLET ORAL at 21:46

## 2019-01-01 RX ADMIN — SUCRALFATE 1000 MG: 1 SUSPENSION ORAL at 16:25

## 2019-01-01 RX ADMIN — SERTRALINE HYDROCHLORIDE 50 MG: 50 TABLET ORAL at 21:51

## 2019-01-01 RX ADMIN — CLOZAPINE 50 MG: 25 TABLET ORAL at 16:46

## 2019-01-01 RX ADMIN — CLOZAPINE 25 MG: 25 TABLET ORAL at 21:29

## 2019-01-01 RX ADMIN — SERTRALINE HYDROCHLORIDE 50 MG: 50 TABLET ORAL at 09:12

## 2019-01-01 RX ADMIN — FLUTICASONE FUROATE AND VILANTEROL TRIFENATATE 1 PUFF: 200; 25 POWDER RESPIRATORY (INHALATION) at 08:44

## 2019-01-01 RX ADMIN — SERTRALINE HYDROCHLORIDE 50 MG: 50 TABLET ORAL at 08:29

## 2019-01-01 RX ADMIN — CLOZAPINE 50 MG: 25 TABLET ORAL at 20:11

## 2019-01-01 RX ADMIN — MONTELUKAST SODIUM 10 MG: 10 TABLET, COATED ORAL at 21:07

## 2019-01-01 RX ADMIN — LEVOTHYROXINE SODIUM 125 MCG: 125 TABLET ORAL at 05:42

## 2019-01-01 RX ADMIN — THEOPHYLLINE ANHYDROUS 200 MG: 200 CAPSULE, EXTENDED RELEASE ORAL at 08:43

## 2019-01-01 RX ADMIN — CLOZAPINE 25 MG: 25 TABLET ORAL at 17:56

## 2019-01-01 RX ADMIN — THEOPHYLLINE ANHYDROUS 200 MG: 200 CAPSULE, EXTENDED RELEASE ORAL at 08:59

## 2019-01-01 RX ADMIN — CLOZAPINE 50 MG: 25 TABLET ORAL at 21:39

## 2019-01-01 RX ADMIN — SERTRALINE HYDROCHLORIDE 50 MG: 50 TABLET ORAL at 08:19

## 2019-01-01 RX ADMIN — CLOZAPINE 25 MG: 25 TABLET ORAL at 16:46

## 2019-01-01 RX ADMIN — CLOZAPINE 25 MG: 25 TABLET ORAL at 16:28

## 2019-01-01 RX ADMIN — THEOPHYLLINE ANHYDROUS 200 MG: 200 CAPSULE, EXTENDED RELEASE ORAL at 08:26

## 2019-01-01 RX ADMIN — SERTRALINE HYDROCHLORIDE 50 MG: 50 TABLET ORAL at 21:27

## 2019-01-01 RX ADMIN — CLOZAPINE 50 MG: 25 TABLET ORAL at 17:45

## 2019-01-01 RX ADMIN — TIOTROPIUM BROMIDE 18 MCG: 18 CAPSULE ORAL; RESPIRATORY (INHALATION) at 08:56

## 2019-01-01 RX ADMIN — FLUTICASONE FUROATE AND VILANTEROL TRIFENATATE 1 PUFF: 200; 25 POWDER RESPIRATORY (INHALATION) at 08:49

## 2019-01-01 RX ADMIN — PANTOPRAZOLE SODIUM 40 MG: 40 TABLET, DELAYED RELEASE ORAL at 05:58

## 2019-01-01 RX ADMIN — SUCRALFATE 1000 MG: 1 SUSPENSION ORAL at 17:02

## 2019-01-01 RX ADMIN — CARBAMIDE PEROXIDE 6.5% 5 DROP: 6.5 LIQUID AURICULAR (OTIC) at 21:21

## 2019-01-01 RX ADMIN — CLOZAPINE 25 MG: 25 TABLET ORAL at 21:43

## 2019-01-01 RX ADMIN — SERTRALINE HYDROCHLORIDE 50 MG: 50 TABLET ORAL at 20:51

## 2019-01-01 RX ADMIN — CLOZAPINE 50 MG: 25 TABLET ORAL at 17:53

## 2019-01-01 RX ADMIN — CLOZAPINE 25 MG: 25 TABLET ORAL at 21:26

## 2019-01-01 RX ADMIN — CLOZAPINE 50 MG: 25 TABLET ORAL at 21:09

## 2019-01-01 RX ADMIN — SERTRALINE HYDROCHLORIDE 50 MG: 50 TABLET ORAL at 08:34

## 2019-01-01 RX ADMIN — CLOZAPINE 25 MG: 25 TABLET ORAL at 17:37

## 2019-01-01 RX ADMIN — THEOPHYLLINE ANHYDROUS 200 MG: 200 CAPSULE, EXTENDED RELEASE ORAL at 08:19

## 2019-01-01 RX ADMIN — CLOZAPINE 25 MG: 25 TABLET ORAL at 16:12

## 2019-01-01 RX ADMIN — CARBAMIDE PEROXIDE 6.5% 5 DROP: 6.5 LIQUID AURICULAR (OTIC) at 08:48

## 2019-01-01 RX ADMIN — LEVOTHYROXINE SODIUM 125 MCG: 125 TABLET ORAL at 06:07

## 2019-01-01 RX ADMIN — CLOZAPINE 25 MG: 25 TABLET ORAL at 17:09

## 2019-01-01 RX ADMIN — THEOPHYLLINE ANHYDROUS 200 MG: 200 CAPSULE, EXTENDED RELEASE ORAL at 08:44

## 2019-01-01 RX ADMIN — CLOZAPINE 50 MG: 25 TABLET ORAL at 20:44

## 2019-01-01 RX ADMIN — CLOZAPINE 25 MG: 25 TABLET ORAL at 16:48

## 2019-01-01 RX ADMIN — SUCRALFATE 1000 MG: 1 SUSPENSION ORAL at 15:05

## 2019-01-01 RX ADMIN — THEOPHYLLINE ANHYDROUS 200 MG: 200 CAPSULE, EXTENDED RELEASE ORAL at 08:54

## 2019-01-01 RX ADMIN — CLOZAPINE 25 MG: 25 TABLET ORAL at 21:21

## 2019-01-01 RX ADMIN — THEOPHYLLINE ANHYDROUS 200 MG: 200 CAPSULE, EXTENDED RELEASE ORAL at 08:38

## 2019-01-01 RX ADMIN — FLUTICASONE FUROATE AND VILANTEROL TRIFENATATE 1 PUFF: 200; 25 POWDER RESPIRATORY (INHALATION) at 08:53

## 2019-01-01 RX ADMIN — TIOTROPIUM BROMIDE 18 MCG: 18 CAPSULE ORAL; RESPIRATORY (INHALATION) at 09:00

## 2019-01-01 RX ADMIN — CLOZAPINE 25 MG: 25 TABLET ORAL at 21:48

## 2019-01-01 RX ADMIN — PANTOPRAZOLE SODIUM 40 MG: 40 TABLET, DELAYED RELEASE ORAL at 06:36

## 2019-01-01 RX ADMIN — SERTRALINE HYDROCHLORIDE 50 MG: 50 TABLET ORAL at 08:36

## 2019-01-01 RX ADMIN — THEOPHYLLINE ANHYDROUS 200 MG: 200 CAPSULE, EXTENDED RELEASE ORAL at 08:29

## 2019-01-01 RX ADMIN — SUCRALFATE 1000 MG: 1 SUSPENSION ORAL at 06:08

## 2019-01-01 RX ADMIN — MONTELUKAST SODIUM 10 MG: 10 TABLET, COATED ORAL at 21:27

## 2019-01-01 RX ADMIN — CLOZAPINE 50 MG: 25 TABLET ORAL at 17:48

## 2019-01-01 RX ADMIN — CLOZAPINE 50 MG: 25 TABLET ORAL at 21:42

## 2019-01-01 RX ADMIN — FLUTICASONE FUROATE AND VILANTEROL TRIFENATATE 1 PUFF: 200; 25 POWDER RESPIRATORY (INHALATION) at 08:19

## 2019-01-01 RX ADMIN — FLUTICASONE FUROATE AND VILANTEROL TRIFENATATE 1 PUFF: 200; 25 POWDER RESPIRATORY (INHALATION) at 08:34

## 2019-01-01 RX ADMIN — SUCRALFATE 1000 MG: 1 SUSPENSION ORAL at 16:03

## 2019-01-01 RX ADMIN — LEVOTHYROXINE SODIUM 125 MCG: 125 TABLET ORAL at 06:09

## 2019-01-01 RX ADMIN — CLOZAPINE 25 MG: 25 TABLET ORAL at 17:43

## 2019-01-01 RX ADMIN — THEOPHYLLINE ANHYDROUS 200 MG: 200 CAPSULE, EXTENDED RELEASE ORAL at 08:45

## 2019-01-01 RX ADMIN — CARBAMIDE PEROXIDE 6.5% 5 DROP: 6.5 LIQUID AURICULAR (OTIC) at 20:07

## 2019-01-01 RX ADMIN — SUCRALFATE 1000 MG: 1 SUSPENSION ORAL at 16:11

## 2019-01-01 RX ADMIN — CLOZAPINE 50 MG: 25 TABLET ORAL at 21:14

## 2019-01-01 RX ADMIN — THEOPHYLLINE ANHYDROUS 200 MG: 200 CAPSULE, EXTENDED RELEASE ORAL at 08:36

## 2019-01-01 RX ADMIN — CLOZAPINE 50 MG: 25 TABLET ORAL at 21:20

## 2019-01-01 RX ADMIN — TIOTROPIUM BROMIDE 18 MCG: 18 CAPSULE ORAL; RESPIRATORY (INHALATION) at 09:12

## 2019-01-01 RX ADMIN — PANTOPRAZOLE SODIUM 40 MG: 40 TABLET, DELAYED RELEASE ORAL at 05:50

## 2019-01-01 RX ADMIN — THEOPHYLLINE ANHYDROUS 200 MG: 200 CAPSULE, EXTENDED RELEASE ORAL at 08:46

## 2019-01-01 RX ADMIN — CLOZAPINE 50 MG: 25 TABLET ORAL at 17:03

## 2019-01-01 RX ADMIN — FLUTICASONE FUROATE AND VILANTEROL TRIFENATATE 1 PUFF: 200; 25 POWDER RESPIRATORY (INHALATION) at 09:01

## 2019-01-01 RX ADMIN — FLUTICASONE FUROATE AND VILANTEROL TRIFENATATE 1 PUFF: 200; 25 POWDER RESPIRATORY (INHALATION) at 08:40

## 2019-01-01 RX ADMIN — TIOTROPIUM BROMIDE 18 MCG: 18 CAPSULE ORAL; RESPIRATORY (INHALATION) at 08:20

## 2019-01-01 RX ADMIN — CLOZAPINE 50 MG: 25 TABLET ORAL at 17:00

## 2019-01-01 RX ADMIN — THEOPHYLLINE ANHYDROUS 200 MG: 200 CAPSULE, EXTENDED RELEASE ORAL at 08:51

## 2019-01-01 RX ADMIN — PANTOPRAZOLE SODIUM 40 MG: 40 TABLET, DELAYED RELEASE ORAL at 06:03

## 2019-01-01 RX ADMIN — PANTOPRAZOLE SODIUM 40 MG: 40 TABLET, DELAYED RELEASE ORAL at 05:39

## 2019-01-01 RX ADMIN — CLOZAPINE 25 MG: 25 TABLET ORAL at 21:46

## 2019-01-01 RX ADMIN — TIOTROPIUM BROMIDE 18 MCG: 18 CAPSULE ORAL; RESPIRATORY (INHALATION) at 08:41

## 2019-01-01 RX ADMIN — CLOZAPINE 25 MG: 25 TABLET ORAL at 18:03

## 2019-01-01 RX ADMIN — CLOZAPINE 50 MG: 25 TABLET ORAL at 21:04

## 2019-01-01 RX ADMIN — TIOTROPIUM BROMIDE 18 MCG: 18 CAPSULE ORAL; RESPIRATORY (INHALATION) at 09:05

## 2019-01-01 RX ADMIN — PANTOPRAZOLE SODIUM 40 MG: 40 TABLET, DELAYED RELEASE ORAL at 06:00

## 2019-01-01 RX ADMIN — THEOPHYLLINE ANHYDROUS 200 MG: 200 CAPSULE, EXTENDED RELEASE ORAL at 08:20

## 2019-01-01 RX ADMIN — CLOZAPINE 50 MG: 25 TABLET ORAL at 21:34

## 2019-01-01 RX ADMIN — SUCRALFATE 1000 MG: 1 SUSPENSION ORAL at 16:44

## 2019-01-01 RX ADMIN — LEVOTHYROXINE SODIUM 125 MCG: 125 TABLET ORAL at 06:00

## 2019-01-01 RX ADMIN — LEVOTHYROXINE SODIUM 125 MCG: 125 TABLET ORAL at 05:56

## 2019-01-01 RX ADMIN — CLOZAPINE 50 MG: 25 TABLET ORAL at 21:24

## 2019-01-01 RX ADMIN — CARBAMIDE PEROXIDE 6.5% 5 DROP: 6.5 LIQUID AURICULAR (OTIC) at 18:21

## 2019-01-01 RX ADMIN — LEVOTHYROXINE SODIUM 125 MCG: 125 TABLET ORAL at 06:06

## 2019-01-01 RX ADMIN — PANTOPRAZOLE SODIUM 40 MG: 40 TABLET, DELAYED RELEASE ORAL at 05:31

## 2019-01-01 RX ADMIN — CLOZAPINE 25 MG: 25 TABLET ORAL at 20:55

## 2019-01-01 RX ADMIN — CLOZAPINE 50 MG: 25 TABLET ORAL at 16:28

## 2019-01-01 RX ADMIN — CLOZAPINE 25 MG: 25 TABLET ORAL at 17:06

## 2019-01-01 RX ADMIN — CLOZAPINE 25 MG: 25 TABLET ORAL at 21:39

## 2019-01-01 RX ADMIN — PANTOPRAZOLE SODIUM 40 MG: 40 TABLET, DELAYED RELEASE ORAL at 05:42

## 2019-01-01 RX ADMIN — CLOZAPINE 25 MG: 25 TABLET ORAL at 17:21

## 2019-01-01 RX ADMIN — MONTELUKAST SODIUM 10 MG: 10 TABLET, COATED ORAL at 21:14

## 2019-01-01 RX ADMIN — SERTRALINE HYDROCHLORIDE 50 MG: 50 TABLET ORAL at 21:29

## 2019-01-01 RX ADMIN — CLOZAPINE 50 MG: 25 TABLET ORAL at 17:14

## 2019-01-01 RX ADMIN — FLUTICASONE FUROATE AND VILANTEROL TRIFENATATE 1 PUFF: 200; 25 POWDER RESPIRATORY (INHALATION) at 08:41

## 2019-01-01 RX ADMIN — SERTRALINE HYDROCHLORIDE 50 MG: 50 TABLET ORAL at 21:05

## 2019-01-01 RX ADMIN — FLUTICASONE FUROATE AND VILANTEROL TRIFENATATE 1 PUFF: 200; 25 POWDER RESPIRATORY (INHALATION) at 09:04

## 2019-01-01 RX ADMIN — SUCRALFATE 1000 MG: 1 SUSPENSION ORAL at 06:09

## 2019-01-01 RX ADMIN — LEVOTHYROXINE SODIUM 125 MCG: 125 TABLET ORAL at 06:18

## 2019-01-01 RX ADMIN — CLOZAPINE 25 MG: 25 TABLET ORAL at 20:44

## 2019-01-01 RX ADMIN — THEOPHYLLINE ANHYDROUS 200 MG: 200 CAPSULE, EXTENDED RELEASE ORAL at 09:15

## 2019-01-01 RX ADMIN — CARBAMIDE PEROXIDE 6.5% 5 DROP: 6.5 LIQUID AURICULAR (OTIC) at 08:29

## 2019-01-01 RX ADMIN — CLOZAPINE 50 MG: 25 TABLET ORAL at 16:34

## 2019-01-01 RX ADMIN — SUCRALFATE 1000 MG: 1 SUSPENSION ORAL at 16:02

## 2019-01-01 RX ADMIN — SUCRALFATE 1000 MG: 1 SUSPENSION ORAL at 16:23

## 2019-01-01 RX ADMIN — FLUTICASONE FUROATE AND VILANTEROL TRIFENATATE 1 PUFF: 200; 25 POWDER RESPIRATORY (INHALATION) at 09:03

## 2019-01-01 RX ADMIN — PANTOPRAZOLE SODIUM 40 MG: 40 TABLET, DELAYED RELEASE ORAL at 06:18

## 2019-01-01 RX ADMIN — CLOZAPINE 25 MG: 25 TABLET ORAL at 21:11

## 2019-01-01 RX ADMIN — CLOZAPINE 25 MG: 25 TABLET ORAL at 21:15

## 2019-01-01 RX ADMIN — SERTRALINE HYDROCHLORIDE 50 MG: 50 TABLET ORAL at 21:31

## 2019-01-01 RX ADMIN — PANTOPRAZOLE SODIUM 40 MG: 40 TABLET, DELAYED RELEASE ORAL at 06:31

## 2019-01-01 RX ADMIN — FLUTICASONE FUROATE AND VILANTEROL TRIFENATATE 1 PUFF: 200; 25 POWDER RESPIRATORY (INHALATION) at 08:57

## 2019-01-01 RX ADMIN — CLOZAPINE 25 MG: 25 TABLET ORAL at 17:20

## 2019-01-01 RX ADMIN — SUCRALFATE 1000 MG: 1 SUSPENSION ORAL at 16:15

## 2019-01-01 RX ADMIN — TIOTROPIUM BROMIDE 18 MCG: 18 CAPSULE ORAL; RESPIRATORY (INHALATION) at 08:32

## 2019-01-01 RX ADMIN — CLOZAPINE 25 MG: 25 TABLET ORAL at 17:44

## 2019-01-01 RX ADMIN — CLOZAPINE 50 MG: 25 TABLET ORAL at 21:47

## 2019-01-01 RX ADMIN — CLOZAPINE 50 MG: 25 TABLET ORAL at 17:13

## 2019-01-01 RX ADMIN — SERTRALINE HYDROCHLORIDE 50 MG: 50 TABLET ORAL at 08:50

## 2019-01-01 RX ADMIN — PANTOPRAZOLE SODIUM 40 MG: 40 TABLET, DELAYED RELEASE ORAL at 05:46

## 2019-01-01 RX ADMIN — TIOTROPIUM BROMIDE 18 MCG: 18 CAPSULE ORAL; RESPIRATORY (INHALATION) at 08:19

## 2019-01-01 RX ADMIN — SERTRALINE HYDROCHLORIDE 50 MG: 50 TABLET ORAL at 08:41

## 2019-01-01 RX ADMIN — FLUOXETINE 30 MG: 20 CAPSULE ORAL at 08:56

## 2019-01-01 RX ADMIN — PANTOPRAZOLE SODIUM 40 MG: 40 TABLET, DELAYED RELEASE ORAL at 06:07

## 2019-01-01 RX ADMIN — TIOTROPIUM BROMIDE 18 MCG: 18 CAPSULE ORAL; RESPIRATORY (INHALATION) at 08:54

## 2019-01-01 RX ADMIN — LEVOTHYROXINE SODIUM 125 MCG: 125 TABLET ORAL at 06:36

## 2019-01-01 RX ADMIN — CLOZAPINE 50 MG: 25 TABLET ORAL at 17:44

## 2019-01-01 RX ADMIN — CLOZAPINE 25 MG: 25 TABLET ORAL at 20:51

## 2019-01-01 RX ADMIN — FLUTICASONE FUROATE AND VILANTEROL TRIFENATATE 1 PUFF: 200; 25 POWDER RESPIRATORY (INHALATION) at 08:54

## 2019-01-01 RX ADMIN — LEVOTHYROXINE SODIUM 125 MCG: 125 TABLET ORAL at 06:11

## 2019-01-01 RX ADMIN — CLOZAPINE 50 MG: 25 TABLET ORAL at 17:51

## 2019-01-01 RX ADMIN — THEOPHYLLINE ANHYDROUS 200 MG: 200 CAPSULE, EXTENDED RELEASE ORAL at 08:33

## 2019-01-01 RX ADMIN — PANTOPRAZOLE SODIUM 40 MG: 40 TABLET, DELAYED RELEASE ORAL at 06:01

## 2019-01-01 RX ADMIN — CLOZAPINE 50 MG: 25 TABLET ORAL at 17:55

## 2019-01-01 RX ADMIN — ONDANSETRON 4 MG: 4 TABLET, ORALLY DISINTEGRATING ORAL at 17:57

## 2019-01-01 RX ADMIN — TIOTROPIUM BROMIDE 18 MCG: 18 CAPSULE ORAL; RESPIRATORY (INHALATION) at 08:40

## 2019-01-01 RX ADMIN — CLOZAPINE 50 MG: 25 TABLET ORAL at 21:00

## 2019-01-01 RX ADMIN — CLOZAPINE 25 MG: 25 TABLET ORAL at 21:59

## 2019-01-01 RX ADMIN — CARBAMIDE PEROXIDE 6.5% 5 DROP: 6.5 LIQUID AURICULAR (OTIC) at 08:27

## 2019-01-01 RX ADMIN — TIOTROPIUM BROMIDE 18 MCG: 18 CAPSULE ORAL; RESPIRATORY (INHALATION) at 08:29

## 2019-01-01 RX ADMIN — TIOTROPIUM BROMIDE 18 MCG: 18 CAPSULE ORAL; RESPIRATORY (INHALATION) at 08:39

## 2019-01-01 RX ADMIN — SUCRALFATE 1000 MG: 1 SUSPENSION ORAL at 17:12

## 2019-01-01 RX ADMIN — FLUTICASONE FUROATE AND VILANTEROL TRIFENATATE 1 PUFF: 200; 25 POWDER RESPIRATORY (INHALATION) at 08:36

## 2019-01-01 RX ADMIN — CLOZAPINE 25 MG: 25 TABLET ORAL at 16:50

## 2019-01-01 RX ADMIN — FLUTICASONE FUROATE AND VILANTEROL TRIFENATATE 1 PUFF: 200; 25 POWDER RESPIRATORY (INHALATION) at 08:30

## 2019-01-01 RX ADMIN — LEVOTHYROXINE SODIUM 125 MCG: 125 TABLET ORAL at 05:58

## 2019-01-01 RX ADMIN — SUCRALFATE 1000 MG: 1 SUSPENSION ORAL at 16:16

## 2019-01-01 RX ADMIN — CLOZAPINE 25 MG: 25 TABLET ORAL at 20:57

## 2019-01-01 RX ADMIN — CLOZAPINE 25 MG: 25 TABLET ORAL at 21:33

## 2019-01-01 RX ADMIN — TIOTROPIUM BROMIDE 18 MCG: 18 CAPSULE ORAL; RESPIRATORY (INHALATION) at 08:50

## 2019-01-01 RX ADMIN — THEOPHYLLINE ANHYDROUS 200 MG: 200 CAPSULE, EXTENDED RELEASE ORAL at 09:03

## 2019-01-01 RX ADMIN — CLOZAPINE 25 MG: 25 TABLET ORAL at 16:58

## 2019-01-01 RX ADMIN — CLOZAPINE 50 MG: 25 TABLET ORAL at 21:21

## 2019-01-01 RX ADMIN — LEVOTHYROXINE SODIUM 125 MCG: 125 TABLET ORAL at 05:45

## 2019-01-01 RX ADMIN — CLOZAPINE 25 MG: 25 TABLET ORAL at 21:16

## 2019-01-01 RX ADMIN — THEOPHYLLINE ANHYDROUS 200 MG: 200 CAPSULE, EXTENDED RELEASE ORAL at 08:28

## 2019-01-01 RX ADMIN — CLOZAPINE 50 MG: 25 TABLET ORAL at 17:56

## 2019-01-01 RX ADMIN — SERTRALINE HYDROCHLORIDE 50 MG: 50 TABLET ORAL at 08:25

## 2019-01-01 RX ADMIN — CLOZAPINE 50 MG: 25 TABLET ORAL at 17:08

## 2019-01-01 RX ADMIN — MONTELUKAST SODIUM 10 MG: 10 TABLET, COATED ORAL at 21:24

## 2019-01-01 RX ADMIN — PANTOPRAZOLE SODIUM 40 MG: 40 TABLET, DELAYED RELEASE ORAL at 05:57

## 2019-01-01 RX ADMIN — SERTRALINE HYDROCHLORIDE 50 MG: 50 TABLET ORAL at 08:54

## 2019-01-01 RX ADMIN — CLOZAPINE 25 MG: 25 TABLET ORAL at 17:12

## 2019-01-01 RX ADMIN — SERTRALINE HYDROCHLORIDE 50 MG: 50 TABLET ORAL at 21:00

## 2019-01-01 RX ADMIN — CLOZAPINE 25 MG: 25 TABLET ORAL at 20:54

## 2019-01-01 RX ADMIN — FLUTICASONE FUROATE AND VILANTEROL TRIFENATATE 1 PUFF: 200; 25 POWDER RESPIRATORY (INHALATION) at 09:12

## 2019-01-01 RX ADMIN — SERTRALINE HYDROCHLORIDE 50 MG: 50 TABLET ORAL at 21:47

## 2019-01-01 RX ADMIN — CLOZAPINE 25 MG: 25 TABLET ORAL at 17:03

## 2019-01-01 RX ADMIN — CLOZAPINE 50 MG: 25 TABLET ORAL at 16:47

## 2019-01-01 RX ADMIN — CLOZAPINE 25 MG: 25 TABLET ORAL at 21:04

## 2019-01-01 RX ADMIN — THEOPHYLLINE ANHYDROUS 200 MG: 200 CAPSULE, EXTENDED RELEASE ORAL at 09:04

## 2019-01-01 RX ADMIN — CARBAMIDE PEROXIDE 6.5% 5 DROP: 6.5 LIQUID AURICULAR (OTIC) at 17:08

## 2019-01-01 RX ADMIN — TIOTROPIUM BROMIDE 18 MCG: 18 CAPSULE ORAL; RESPIRATORY (INHALATION) at 09:02

## 2019-01-01 RX ADMIN — SERTRALINE HYDROCHLORIDE 50 MG: 50 TABLET ORAL at 21:42

## 2019-01-01 RX ADMIN — FLUTICASONE FUROATE AND VILANTEROL TRIFENATATE 1 PUFF: 200; 25 POWDER RESPIRATORY (INHALATION) at 08:31

## 2019-01-01 RX ADMIN — SUCRALFATE 1000 MG: 1 SUSPENSION ORAL at 06:17

## 2019-01-01 RX ADMIN — CLOZAPINE 25 MG: 25 TABLET ORAL at 20:53

## 2019-01-01 RX ADMIN — SERTRALINE HYDROCHLORIDE 50 MG: 50 TABLET ORAL at 08:31

## 2019-01-01 RX ADMIN — SERTRALINE HYDROCHLORIDE 50 MG: 50 TABLET ORAL at 08:23

## 2019-01-01 RX ADMIN — CLOZAPINE 25 MG: 25 TABLET ORAL at 16:56

## 2019-01-01 RX ADMIN — CLOZAPINE 50 MG: 25 TABLET ORAL at 21:28

## 2019-01-01 RX ADMIN — MONTELUKAST SODIUM 10 MG: 10 TABLET, COATED ORAL at 21:44

## 2019-01-01 RX ADMIN — CLOZAPINE 25 MG: 25 TABLET ORAL at 17:57

## 2019-01-01 RX ADMIN — CLOZAPINE 25 MG: 25 TABLET ORAL at 21:51

## 2019-01-01 RX ADMIN — TIOTROPIUM BROMIDE 18 MCG: 18 CAPSULE ORAL; RESPIRATORY (INHALATION) at 08:36

## 2019-01-01 RX ADMIN — CLOZAPINE 50 MG: 25 TABLET ORAL at 18:03

## 2019-01-01 RX ADMIN — SERTRALINE HYDROCHLORIDE 50 MG: 50 TABLET ORAL at 20:55

## 2019-01-01 RX ADMIN — SUCRALFATE 1000 MG: 1 SUSPENSION ORAL at 17:15

## 2019-01-01 RX ADMIN — CLOZAPINE 25 MG: 25 TABLET ORAL at 21:23

## 2019-01-01 RX ADMIN — CLOZAPINE 25 MG: 25 TABLET ORAL at 20:11

## 2019-01-01 RX ADMIN — CLOZAPINE 50 MG: 25 TABLET ORAL at 21:48

## 2019-01-01 RX ADMIN — CARBAMIDE PEROXIDE 6.5% 5 DROP: 6.5 LIQUID AURICULAR (OTIC) at 08:34

## 2019-01-01 RX ADMIN — THEOPHYLLINE ANHYDROUS 200 MG: 200 CAPSULE, EXTENDED RELEASE ORAL at 08:23

## 2019-01-01 RX ADMIN — TIOTROPIUM BROMIDE 18 MCG: 18 CAPSULE ORAL; RESPIRATORY (INHALATION) at 08:30

## 2019-01-01 RX ADMIN — LEVOTHYROXINE SODIUM 125 MCG: 125 TABLET ORAL at 05:39

## 2019-01-01 RX ADMIN — CLOZAPINE 25 MG: 25 TABLET ORAL at 21:09

## 2019-01-01 RX ADMIN — CLOZAPINE 50 MG: 25 TABLET ORAL at 21:16

## 2019-01-01 RX ADMIN — CLOZAPINE 25 MG: 25 TABLET ORAL at 17:02

## 2019-01-01 RX ADMIN — CARBAMIDE PEROXIDE 6.5% 5 DROP: 6.5 LIQUID AURICULAR (OTIC) at 21:39

## 2019-01-01 RX ADMIN — SERTRALINE HYDROCHLORIDE 50 MG: 50 TABLET ORAL at 09:01

## 2019-01-01 RX ADMIN — CLOZAPINE 25 MG: 25 TABLET ORAL at 16:59

## 2019-01-01 RX ADMIN — TIOTROPIUM BROMIDE 18 MCG: 18 CAPSULE ORAL; RESPIRATORY (INHALATION) at 08:49

## 2019-01-01 RX ADMIN — CLOZAPINE 50 MG: 25 TABLET ORAL at 21:27

## 2019-01-01 RX ADMIN — SUCRALFATE 1000 MG: 1 SUSPENSION ORAL at 06:15

## 2019-01-01 RX ADMIN — CARBAMIDE PEROXIDE 6.5% 5 DROP: 6.5 LIQUID AURICULAR (OTIC) at 08:30

## 2019-01-01 RX ADMIN — PANTOPRAZOLE SODIUM 40 MG: 40 TABLET, DELAYED RELEASE ORAL at 06:19

## 2019-01-01 RX ADMIN — CLOZAPINE 25 MG: 25 TABLET ORAL at 21:37

## 2019-01-01 RX ADMIN — FLUTICASONE FUROATE AND VILANTEROL TRIFENATATE 1 PUFF: 200; 25 POWDER RESPIRATORY (INHALATION) at 08:24

## 2019-01-01 RX ADMIN — SERTRALINE HYDROCHLORIDE 50 MG: 50 TABLET ORAL at 21:23

## 2019-01-01 RX ADMIN — THEOPHYLLINE ANHYDROUS 200 MG: 200 CAPSULE, EXTENDED RELEASE ORAL at 08:50

## 2019-01-01 RX ADMIN — FLUTICASONE FUROATE AND VILANTEROL TRIFENATATE 1 PUFF: 200; 25 POWDER RESPIRATORY (INHALATION) at 08:28

## 2019-01-01 RX ADMIN — SERTRALINE HYDROCHLORIDE 50 MG: 50 TABLET ORAL at 08:46

## 2019-01-01 RX ADMIN — THEOPHYLLINE ANHYDROUS 200 MG: 200 CAPSULE, EXTENDED RELEASE ORAL at 08:34

## 2019-01-01 RX ADMIN — MONTELUKAST SODIUM 10 MG: 10 TABLET, COATED ORAL at 21:09

## 2019-01-01 RX ADMIN — FLUTICASONE FUROATE AND VILANTEROL TRIFENATATE 1 PUFF: 200; 25 POWDER RESPIRATORY (INHALATION) at 09:11

## 2019-01-01 RX ADMIN — CLOZAPINE 50 MG: 25 TABLET ORAL at 21:33

## 2019-01-01 RX ADMIN — FLUOXETINE 30 MG: 20 CAPSULE ORAL at 08:47

## 2019-01-01 RX ADMIN — SERTRALINE HYDROCHLORIDE 50 MG: 50 TABLET ORAL at 21:09

## 2019-01-01 RX ADMIN — CARBAMIDE PEROXIDE 6.5% 5 DROP: 6.5 LIQUID AURICULAR (OTIC) at 18:44

## 2019-01-01 RX ADMIN — TIOTROPIUM BROMIDE 18 MCG: 18 CAPSULE ORAL; RESPIRATORY (INHALATION) at 09:11

## 2019-01-01 RX ADMIN — CLOZAPINE 50 MG: 25 TABLET ORAL at 20:55

## 2019-01-01 RX ADMIN — CLOZAPINE 25 MG: 25 TABLET ORAL at 21:53

## 2019-01-01 RX ADMIN — SERTRALINE HYDROCHLORIDE 50 MG: 50 TABLET ORAL at 20:44

## 2019-01-01 RX ADMIN — SERTRALINE HYDROCHLORIDE 50 MG: 50 TABLET ORAL at 08:28

## 2019-01-01 RX ADMIN — CLOZAPINE 25 MG: 25 TABLET ORAL at 18:04

## 2019-01-01 RX ADMIN — TIOTROPIUM BROMIDE 18 MCG: 18 CAPSULE ORAL; RESPIRATORY (INHALATION) at 08:47

## 2019-01-01 RX ADMIN — SUCRALFATE 1000 MG: 1 SUSPENSION ORAL at 16:37

## 2019-01-01 RX ADMIN — FLUOXETINE 30 MG: 20 CAPSULE ORAL at 08:57

## 2019-01-01 RX ADMIN — CLOZAPINE 50 MG: 25 TABLET ORAL at 21:23

## 2019-01-01 RX ADMIN — CLOZAPINE 25 MG: 25 TABLET ORAL at 17:01

## 2019-01-01 RX ADMIN — SUCRALFATE 1000 MG: 1 SUSPENSION ORAL at 16:34

## 2019-01-01 RX ADMIN — PANTOPRAZOLE SODIUM 40 MG: 40 TABLET, DELAYED RELEASE ORAL at 06:08

## 2019-01-01 RX ADMIN — LEVOTHYROXINE SODIUM 125 MCG: 125 TABLET ORAL at 05:57

## 2019-01-01 RX ADMIN — CARBAMIDE PEROXIDE 6.5% 5 DROP: 6.5 LIQUID AURICULAR (OTIC) at 21:43

## 2019-01-01 RX ADMIN — CLOZAPINE 50 MG: 25 TABLET ORAL at 17:34

## 2019-01-01 RX ADMIN — FLUTICASONE FUROATE AND VILANTEROL TRIFENATATE 1 PUFF: 200; 25 POWDER RESPIRATORY (INHALATION) at 08:50

## 2019-01-01 RX ADMIN — SERTRALINE HYDROCHLORIDE 50 MG: 50 TABLET ORAL at 08:51

## 2019-01-01 RX ADMIN — CLOZAPINE 25 MG: 25 TABLET ORAL at 21:38

## 2019-01-01 RX ADMIN — TIOTROPIUM BROMIDE 18 MCG: 18 CAPSULE ORAL; RESPIRATORY (INHALATION) at 08:24

## 2019-01-01 RX ADMIN — CLOZAPINE 50 MG: 25 TABLET ORAL at 21:43

## 2019-01-01 RX ADMIN — SERTRALINE HYDROCHLORIDE 50 MG: 50 TABLET ORAL at 08:33

## 2019-01-01 RX ADMIN — PANTOPRAZOLE SODIUM 40 MG: 40 TABLET, DELAYED RELEASE ORAL at 05:45

## 2019-01-01 RX ADMIN — CLOZAPINE 25 MG: 25 TABLET ORAL at 16:47

## 2019-01-01 RX ADMIN — FLUTICASONE FUROATE AND VILANTEROL TRIFENATATE 1 PUFF: 200; 25 POWDER RESPIRATORY (INHALATION) at 08:26

## 2019-01-01 RX ADMIN — CLOZAPINE 50 MG: 25 TABLET ORAL at 16:49

## 2019-01-01 RX ADMIN — SUCRALFATE 1000 MG: 1 SUSPENSION ORAL at 16:24

## 2019-01-01 RX ADMIN — MONTELUKAST SODIUM 10 MG: 10 TABLET, COATED ORAL at 21:41

## 2019-01-01 RX ADMIN — ONDANSETRON 4 MG: 4 TABLET, ORALLY DISINTEGRATING ORAL at 12:23

## 2019-01-01 RX ADMIN — SUCRALFATE 1000 MG: 1 SUSPENSION ORAL at 06:07

## 2019-01-01 RX ADMIN — CARBAMIDE PEROXIDE 6.5% 5 DROP: 6.5 LIQUID AURICULAR (OTIC) at 21:32

## 2019-01-01 RX ADMIN — CLOZAPINE 25 MG: 25 TABLET ORAL at 21:14

## 2019-01-01 RX ADMIN — LEVOTHYROXINE SODIUM 125 MCG: 125 TABLET ORAL at 06:19

## 2019-01-01 RX ADMIN — SERTRALINE HYDROCHLORIDE 50 MG: 50 TABLET ORAL at 21:39

## 2019-01-01 RX ADMIN — CLOZAPINE 50 MG: 25 TABLET ORAL at 16:48

## 2019-01-01 RX ADMIN — SERTRALINE HYDROCHLORIDE 50 MG: 50 TABLET ORAL at 20:31

## 2019-01-01 RX ADMIN — THEOPHYLLINE ANHYDROUS 200 MG: 200 CAPSULE, EXTENDED RELEASE ORAL at 08:41

## 2019-01-01 RX ADMIN — SUCRALFATE 1000 MG: 1 SUSPENSION ORAL at 16:58

## 2019-01-01 RX ADMIN — SERTRALINE HYDROCHLORIDE 50 MG: 50 TABLET ORAL at 21:43

## 2019-01-01 RX ADMIN — SUCRALFATE 1000 MG: 1 SUSPENSION ORAL at 06:11

## 2019-01-01 RX ADMIN — CLOZAPINE 25 MG: 25 TABLET ORAL at 16:44

## 2019-01-01 RX ADMIN — CLOZAPINE 25 MG: 25 TABLET ORAL at 16:27

## 2019-01-01 RX ADMIN — PANTOPRAZOLE SODIUM 40 MG: 40 TABLET, DELAYED RELEASE ORAL at 05:59

## 2019-01-01 RX ADMIN — FLUTICASONE FUROATE AND VILANTEROL TRIFENATATE 1 PUFF: 200; 25 POWDER RESPIRATORY (INHALATION) at 08:32

## 2019-01-01 RX ADMIN — THEOPHYLLINE ANHYDROUS 200 MG: 200 CAPSULE, EXTENDED RELEASE ORAL at 08:31

## 2019-01-01 RX ADMIN — SUCRALFATE 1000 MG: 1 SUSPENSION ORAL at 17:01

## 2019-01-01 RX ADMIN — CLOZAPINE 50 MG: 25 TABLET ORAL at 21:53

## 2019-01-01 RX ADMIN — SERTRALINE HYDROCHLORIDE 50 MG: 50 TABLET ORAL at 21:19

## 2019-01-01 RX ADMIN — CARBAMIDE PEROXIDE 6.5% 5 DROP: 6.5 LIQUID AURICULAR (OTIC) at 21:37

## 2019-01-01 RX ADMIN — CLOZAPINE 50 MG: 25 TABLET ORAL at 17:02

## 2019-01-01 RX ADMIN — TIOTROPIUM BROMIDE 18 MCG: 18 CAPSULE ORAL; RESPIRATORY (INHALATION) at 08:53

## 2019-01-01 RX ADMIN — FLUTICASONE FUROATE AND VILANTEROL TRIFENATATE 1 PUFF: 200; 25 POWDER RESPIRATORY (INHALATION) at 09:15

## 2019-01-01 RX ADMIN — TIOTROPIUM BROMIDE 18 MCG: 18 CAPSULE ORAL; RESPIRATORY (INHALATION) at 08:45

## 2019-01-01 RX ADMIN — TIOTROPIUM BROMIDE 18 MCG: 18 CAPSULE ORAL; RESPIRATORY (INHALATION) at 09:01

## 2019-01-01 RX ADMIN — TIOTROPIUM BROMIDE 18 MCG: 18 CAPSULE ORAL; RESPIRATORY (INHALATION) at 09:15

## 2019-01-01 RX ADMIN — CLOZAPINE 25 MG: 25 TABLET ORAL at 21:32

## 2019-01-01 RX ADMIN — CLOZAPINE 25 MG: 25 TABLET ORAL at 17:04

## 2019-01-01 RX ADMIN — CARBAMIDE PEROXIDE 6.5% 5 DROP: 6.5 LIQUID AURICULAR (OTIC) at 18:31

## 2019-01-01 RX ADMIN — SERTRALINE HYDROCHLORIDE 50 MG: 50 TABLET ORAL at 21:52

## 2019-01-01 RX ADMIN — LEVOTHYROXINE SODIUM 125 MCG: 125 TABLET ORAL at 06:08

## 2019-01-01 RX ADMIN — THEOPHYLLINE ANHYDROUS 200 MG: 200 CAPSULE, EXTENDED RELEASE ORAL at 08:55

## 2019-01-01 RX ADMIN — MONTELUKAST SODIUM 10 MG: 10 TABLET, COATED ORAL at 21:21

## 2019-01-01 RX ADMIN — CLOZAPINE 50 MG: 25 TABLET ORAL at 16:45

## 2019-01-01 RX ADMIN — CLOZAPINE 50 MG: 25 TABLET ORAL at 21:52

## 2019-01-01 RX ADMIN — SERTRALINE HYDROCHLORIDE 50 MG: 50 TABLET ORAL at 21:15

## 2019-01-01 RX ADMIN — CLOZAPINE 25 MG: 25 TABLET ORAL at 17:15

## 2019-01-01 RX ADMIN — CLOZAPINE 50 MG: 25 TABLET ORAL at 20:57

## 2019-01-01 RX ADMIN — CLOZAPINE 25 MG: 25 TABLET ORAL at 16:34

## 2019-01-01 RX ADMIN — SERTRALINE HYDROCHLORIDE 50 MG: 50 TABLET ORAL at 20:32

## 2019-01-01 RX ADMIN — CLOZAPINE 25 MG: 25 TABLET ORAL at 17:52

## 2019-01-01 RX ADMIN — THEOPHYLLINE ANHYDROUS 200 MG: 200 CAPSULE, EXTENDED RELEASE ORAL at 08:57

## 2019-01-01 RX ADMIN — SERTRALINE HYDROCHLORIDE 50 MG: 50 TABLET ORAL at 08:20

## 2019-01-01 RX ADMIN — SUCRALFATE 1000 MG: 1 SUSPENSION ORAL at 06:16

## 2019-01-01 RX ADMIN — SUCRALFATE 1000 MG: 1 SUSPENSION ORAL at 06:06

## 2019-01-01 RX ADMIN — SERTRALINE HYDROCHLORIDE 50 MG: 50 TABLET ORAL at 21:38

## 2019-01-01 RX ADMIN — CLOZAPINE 50 MG: 25 TABLET ORAL at 17:12

## 2019-01-01 RX ADMIN — LEVOTHYROXINE SODIUM 125 MCG: 125 TABLET ORAL at 05:31

## 2019-01-01 RX ADMIN — SUCRALFATE 1000 MG: 1 SUSPENSION ORAL at 16:19

## 2019-01-01 RX ADMIN — SUCRALFATE 1000 MG: 1 SUSPENSION ORAL at 16:57

## 2019-01-01 RX ADMIN — CLOZAPINE 50 MG: 25 TABLET ORAL at 17:19

## 2019-01-01 RX ADMIN — TIOTROPIUM BROMIDE 18 MCG: 18 CAPSULE ORAL; RESPIRATORY (INHALATION) at 08:31

## 2019-01-01 RX ADMIN — CLOZAPINE 25 MG: 25 TABLET ORAL at 17:13

## 2019-01-01 RX ADMIN — CLOZAPINE 25 MG: 25 TABLET ORAL at 20:32

## 2019-01-01 RX ADMIN — LEVOTHYROXINE SODIUM 125 MCG: 125 TABLET ORAL at 05:47

## 2019-01-01 RX ADMIN — CLOZAPINE 50 MG: 25 TABLET ORAL at 17:52

## 2019-01-01 RX ADMIN — CLOZAPINE 25 MG: 25 TABLET ORAL at 21:42

## 2019-01-01 RX ADMIN — CLOZAPINE 50 MG: 25 TABLET ORAL at 21:59

## 2019-01-01 RX ADMIN — SUCRALFATE 1000 MG: 1 SUSPENSION ORAL at 06:46

## 2019-01-01 RX ADMIN — CARBAMIDE PEROXIDE 6.5% 5 DROP: 6.5 LIQUID AURICULAR (OTIC) at 19:37

## 2019-01-01 RX ADMIN — THEOPHYLLINE ANHYDROUS 200 MG: 200 CAPSULE, EXTENDED RELEASE ORAL at 08:25

## 2019-01-01 RX ADMIN — SERTRALINE HYDROCHLORIDE 50 MG: 50 TABLET ORAL at 08:40

## 2019-01-01 RX ADMIN — SUCRALFATE 1000 MG: 1 SUSPENSION ORAL at 06:13

## 2019-01-01 RX ADMIN — CLOZAPINE 50 MG: 25 TABLET ORAL at 20:54

## 2019-01-01 RX ADMIN — CLOZAPINE 50 MG: 25 TABLET ORAL at 17:37

## 2019-01-01 RX ADMIN — SERTRALINE HYDROCHLORIDE 50 MG: 50 TABLET ORAL at 21:58

## 2019-01-01 RX ADMIN — LEVOTHYROXINE SODIUM 125 MCG: 125 TABLET ORAL at 05:59

## 2019-01-01 RX ADMIN — CLOZAPINE 50 MG: 25 TABLET ORAL at 21:22

## 2019-01-01 RX ADMIN — FLUTICASONE FUROATE AND VILANTEROL TRIFENATATE 1 PUFF: 200; 25 POWDER RESPIRATORY (INHALATION) at 08:46

## 2019-01-01 RX ADMIN — SUCRALFATE 1000 MG: 1 SUSPENSION ORAL at 06:02

## 2019-01-01 RX ADMIN — CLOZAPINE 50 MG: 25 TABLET ORAL at 21:26

## 2019-01-01 RX ADMIN — CLOZAPINE 50 MG: 25 TABLET ORAL at 21:38

## 2019-01-01 RX ADMIN — CLOZAPINE 25 MG: 25 TABLET ORAL at 17:08

## 2019-01-01 RX ADMIN — TIOTROPIUM BROMIDE 18 MCG: 18 CAPSULE ORAL; RESPIRATORY (INHALATION) at 08:55

## 2019-01-01 RX ADMIN — THEOPHYLLINE ANHYDROUS 200 MG: 200 CAPSULE, EXTENDED RELEASE ORAL at 08:58

## 2019-01-01 RX ADMIN — CARBAMIDE PEROXIDE 6.5% 5 DROP: 6.5 LIQUID AURICULAR (OTIC) at 09:02

## 2019-01-01 RX ADMIN — CLOZAPINE 50 MG: 25 TABLET ORAL at 18:14

## 2019-01-01 RX ADMIN — THEOPHYLLINE ANHYDROUS 200 MG: 200 CAPSULE, EXTENDED RELEASE ORAL at 08:40

## 2019-01-01 RX ADMIN — CARBAMIDE PEROXIDE 6.5% 5 DROP: 6.5 LIQUID AURICULAR (OTIC) at 08:52

## 2019-01-01 RX ADMIN — CLOZAPINE 50 MG: 25 TABLET ORAL at 16:57

## 2019-01-01 RX ADMIN — CLOZAPINE 25 MG: 25 TABLET ORAL at 17:51

## 2019-01-01 RX ADMIN — CLOZAPINE 50 MG: 25 TABLET ORAL at 21:29

## 2019-01-01 RX ADMIN — CLOZAPINE 50 MG: 25 TABLET ORAL at 20:51

## 2019-01-01 RX ADMIN — CLOZAPINE 25 MG: 25 TABLET ORAL at 21:52

## 2019-01-01 RX ADMIN — CLOZAPINE 50 MG: 25 TABLET ORAL at 21:15

## 2019-01-01 RX ADMIN — FLUTICASONE FUROATE AND VILANTEROL TRIFENATATE 1 PUFF: 200; 25 POWDER RESPIRATORY (INHALATION) at 09:02

## 2019-01-01 RX ADMIN — SUCRALFATE 1000 MG: 1 SUSPENSION ORAL at 16:27

## 2019-01-01 RX ADMIN — SUCRALFATE 1000 MG: 1 SUSPENSION ORAL at 08:15

## 2019-01-01 RX ADMIN — CLOZAPINE 25 MG: 25 TABLET ORAL at 16:49

## 2019-01-01 RX ADMIN — CLOZAPINE 25 MG: 25 TABLET ORAL at 17:19

## 2019-01-01 RX ADMIN — SERTRALINE HYDROCHLORIDE 50 MG: 50 TABLET ORAL at 08:30

## 2019-01-01 RX ADMIN — SERTRALINE HYDROCHLORIDE 50 MG: 50 TABLET ORAL at 21:32

## 2019-01-01 RX ADMIN — LEVOTHYROXINE SODIUM 125 MCG: 125 TABLET ORAL at 06:31

## 2019-01-01 RX ADMIN — SERTRALINE HYDROCHLORIDE 50 MG: 50 TABLET ORAL at 20:11

## 2019-01-01 RX ADMIN — SERTRALINE HYDROCHLORIDE 50 MG: 50 TABLET ORAL at 08:44

## 2019-01-01 RX ADMIN — THEOPHYLLINE ANHYDROUS 200 MG: 200 CAPSULE, EXTENDED RELEASE ORAL at 08:47

## 2019-01-01 RX ADMIN — PANTOPRAZOLE SODIUM 40 MG: 40 TABLET, DELAYED RELEASE ORAL at 05:54

## 2019-01-01 RX ADMIN — CLOZAPINE 50 MG: 25 TABLET ORAL at 17:49

## 2019-01-01 RX ADMIN — PANTOPRAZOLE SODIUM 40 MG: 40 TABLET, DELAYED RELEASE ORAL at 05:47

## 2019-01-01 RX ADMIN — LEVOTHYROXINE SODIUM 125 MCG: 125 TABLET ORAL at 05:46

## 2019-01-01 RX ADMIN — CLOZAPINE 50 MG: 25 TABLET ORAL at 21:44

## 2019-01-01 RX ADMIN — PANTOPRAZOLE SODIUM 40 MG: 40 TABLET, DELAYED RELEASE ORAL at 06:10

## 2019-01-01 RX ADMIN — SERTRALINE HYDROCHLORIDE 50 MG: 50 TABLET ORAL at 08:58

## 2019-01-15 ENCOUNTER — HOSPITAL ENCOUNTER (INPATIENT)
Facility: HOSPITAL | Age: 64
LOS: 64 days | Discharge: HOME | DRG: 750 | End: 2019-03-20
Attending: PSYCHIATRY & NEUROLOGY | Admitting: PSYCHIATRY & NEUROLOGY
Payer: COMMERCIAL

## 2019-01-15 ENCOUNTER — APPOINTMENT (INPATIENT)
Dept: RADIOLOGY | Facility: HOSPITAL | Age: 64
DRG: 750 | End: 2019-01-15
Payer: COMMERCIAL

## 2019-01-15 DIAGNOSIS — J44.9 COPD WITH ASTHMA (HCC): ICD-10-CM

## 2019-01-15 DIAGNOSIS — F25.0 SCHIZOAFFECTIVE DISORDER, BIPOLAR TYPE (HCC): Primary | Chronic | ICD-10-CM

## 2019-01-15 DIAGNOSIS — J98.11 COLLAPSE OF RIGHT LUNG: ICD-10-CM

## 2019-01-15 DIAGNOSIS — F31.9 BIPOLAR DISORDER (HCC): ICD-10-CM

## 2019-01-15 DIAGNOSIS — F17.209 TOBACCO USE DISORDER, CONTINUOUS: ICD-10-CM

## 2019-01-15 DIAGNOSIS — E03.9 ACQUIRED HYPOTHYROIDISM: ICD-10-CM

## 2019-01-15 DIAGNOSIS — M25.552 LEFT HIP PAIN: ICD-10-CM

## 2019-01-15 DIAGNOSIS — Z91.010 PEANUT ALLERGY: ICD-10-CM

## 2019-01-15 PROBLEM — F25.9 SCHIZOAFFECTIVE DISORDER (HCC): Status: ACTIVE | Noted: 2019-01-15

## 2019-01-15 PROBLEM — K21.9 GASTROESOPHAGEAL REFLUX DISEASE WITHOUT ESOPHAGITIS: Status: ACTIVE | Noted: 2019-01-15

## 2019-01-15 PROCEDURE — 94760 N-INVAS EAR/PLS OXIMETRY 1: CPT

## 2019-01-15 PROCEDURE — 99252 IP/OBS CONSLTJ NEW/EST SF 35: CPT | Performed by: NURSE PRACTITIONER

## 2019-01-15 PROCEDURE — 94640 AIRWAY INHALATION TREATMENT: CPT

## 2019-01-15 PROCEDURE — 94664 DEMO&/EVAL PT USE INHALER: CPT

## 2019-01-15 RX ORDER — TRAZODONE HYDROCHLORIDE 50 MG/1
25 TABLET ORAL
Status: DISCONTINUED | OUTPATIENT
Start: 2019-01-15 | End: 2019-03-20 | Stop reason: HOSPADM

## 2019-01-15 RX ORDER — LEVALBUTEROL 1.25 MG/.5ML
1.25 SOLUTION, CONCENTRATE RESPIRATORY (INHALATION) EVERY 6 HOURS PRN
Status: DISCONTINUED | OUTPATIENT
Start: 2019-01-15 | End: 2019-01-17

## 2019-01-15 RX ORDER — FLUTICASONE FUROATE AND VILANTEROL 200; 25 UG/1; UG/1
1 POWDER RESPIRATORY (INHALATION)
Status: DISCONTINUED | OUTPATIENT
Start: 2019-01-16 | End: 2019-01-15

## 2019-01-15 RX ORDER — HYDROXYZINE HYDROCHLORIDE 25 MG/1
25 TABLET, FILM COATED ORAL EVERY 6 HOURS PRN
Status: DISCONTINUED | OUTPATIENT
Start: 2019-01-15 | End: 2019-03-20 | Stop reason: HOSPADM

## 2019-01-15 RX ORDER — BENZTROPINE MESYLATE 1 MG/ML
1 INJECTION INTRAMUSCULAR; INTRAVENOUS 2 TIMES DAILY PRN
Status: DISCONTINUED | OUTPATIENT
Start: 2019-01-15 | End: 2019-03-20 | Stop reason: HOSPADM

## 2019-01-15 RX ORDER — LORAZEPAM 2 MG/ML
1 INJECTION INTRAMUSCULAR EVERY 4 HOURS PRN
Status: DISCONTINUED | OUTPATIENT
Start: 2019-01-15 | End: 2019-03-20 | Stop reason: HOSPADM

## 2019-01-15 RX ORDER — PANTOPRAZOLE SODIUM 20 MG/1
20 TABLET, DELAYED RELEASE ORAL DAILY
Status: DISCONTINUED | OUTPATIENT
Start: 2019-01-16 | End: 2019-03-20 | Stop reason: HOSPADM

## 2019-01-15 RX ORDER — ACETAMINOPHEN 325 MG/1
650 TABLET ORAL EVERY 6 HOURS PRN
Status: DISCONTINUED | OUTPATIENT
Start: 2019-01-15 | End: 2019-03-20 | Stop reason: HOSPADM

## 2019-01-15 RX ORDER — PREDNISONE 1 MG/1
5 TABLET ORAL DAILY
Status: DISCONTINUED | OUTPATIENT
Start: 2019-01-16 | End: 2019-03-05

## 2019-01-15 RX ORDER — PANTOPRAZOLE SODIUM 20 MG/1
20 TABLET, DELAYED RELEASE ORAL DAILY
COMMUNITY
End: 2019-03-21 | Stop reason: SDUPTHER

## 2019-01-15 RX ORDER — NICOTINE 21 MG/24HR
14 PATCH, TRANSDERMAL 24 HOURS TRANSDERMAL DAILY
Status: DISCONTINUED | OUTPATIENT
Start: 2019-01-16 | End: 2019-03-20 | Stop reason: HOSPADM

## 2019-01-15 RX ORDER — ACETAMINOPHEN 325 MG/1
975 TABLET ORAL EVERY 6 HOURS PRN
Status: DISCONTINUED | OUTPATIENT
Start: 2019-01-15 | End: 2019-03-20 | Stop reason: HOSPADM

## 2019-01-15 RX ORDER — PREDNISONE 20 MG/1
10 TABLET ORAL DAILY
Status: DISCONTINUED | OUTPATIENT
Start: 2019-01-16 | End: 2019-01-15

## 2019-01-15 RX ORDER — LORAZEPAM 0.5 MG/1
0.5 TABLET ORAL EVERY 4 HOURS PRN
Status: DISCONTINUED | OUTPATIENT
Start: 2019-01-15 | End: 2019-03-06

## 2019-01-15 RX ORDER — LEVOTHYROXINE SODIUM 0.12 MG/1
125 TABLET ORAL DAILY
Status: DISCONTINUED | OUTPATIENT
Start: 2019-01-16 | End: 2019-03-20 | Stop reason: HOSPADM

## 2019-01-15 RX ORDER — EPINEPHRINE 0.15 MG/.3ML
0.15 INJECTION INTRAMUSCULAR ONCE AS NEEDED
Status: DISCONTINUED | OUTPATIENT
Start: 2019-01-15 | End: 2019-01-15 | Stop reason: SDUPTHER

## 2019-01-15 RX ORDER — ACETAMINOPHEN 325 MG/1
650 TABLET ORAL EVERY 4 HOURS PRN
Status: DISCONTINUED | OUTPATIENT
Start: 2019-01-15 | End: 2019-03-20 | Stop reason: HOSPADM

## 2019-01-15 RX ORDER — PREDNISONE 1 MG/1
5 TABLET ORAL DAILY
Status: DISCONTINUED | OUTPATIENT
Start: 2019-01-19 | End: 2019-01-15

## 2019-01-15 RX ORDER — EPINEPHRINE 1 MG/ML
0.15 INJECTION, SOLUTION, CONCENTRATE INTRAVENOUS AS NEEDED
Status: DISCONTINUED | OUTPATIENT
Start: 2019-01-15 | End: 2019-03-20 | Stop reason: HOSPADM

## 2019-01-15 RX ORDER — MAGNESIUM HYDROXIDE/ALUMINUM HYDROXICE/SIMETHICONE 120; 1200; 1200 MG/30ML; MG/30ML; MG/30ML
30 SUSPENSION ORAL EVERY 4 HOURS PRN
Status: DISCONTINUED | OUTPATIENT
Start: 2019-01-15 | End: 2019-03-01

## 2019-01-15 RX ORDER — BENZTROPINE MESYLATE 1 MG/1
1 TABLET ORAL 2 TIMES DAILY PRN
Status: DISCONTINUED | OUTPATIENT
Start: 2019-01-15 | End: 2019-03-20 | Stop reason: HOSPADM

## 2019-01-15 RX ADMIN — LEVALBUTEROL HYDROCHLORIDE 1.25 MG: 1.25 SOLUTION, CONCENTRATE RESPIRATORY (INHALATION) at 22:13

## 2019-01-15 NOTE — ASSESSMENT & PLAN NOTE
· Pt with COPD, asthma, and continues to smoke  · Current meds:  advair (substitute with Breo), levalbuterol nebs Q6hrs prn, and theophyline  · Per records, she is also on 5mg of prednisone daily  Pt admits she is on daily prednisone  · Check A1c 2/2 chronic steroid use  · Lungs course and very diminished throughout - will check CXR  · Chronic 3L oxygen  · No c/o SOB at this time, and appears to be resting comfortable  · VS stable

## 2019-01-15 NOTE — ASSESSMENT & PLAN NOTE
· Counseled on cessation, in addition to the dangers of smoking with oxygen tank  · Nicotine patch ordered

## 2019-01-15 NOTE — CONSULTS
Progress Note - Cathy Livers 1955, 66 y o  female MRN: [id number]    Unit/Bed#: Stephanie Patient 447-04 Encounter: [id number]    Primary Care Provider: Marianna Raymond MD   Date and time admitted to hospital: 1/15/2019  3:40 PM        Schizoaffective disorder [facility])   Assessment & Plan    · Management per psychiatry  · From personal care home, with documentation of schizoaffective and bipolar d/o  · Unhappy at her personal care home  · Brought in for paranoia and delusions  · She believes she has a tracker in her arm  · Labs and EKG pending:  CBC, CMP, A1c, RPR, TSH, lipid panel  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[facility] [address] )   Assessment & Plan    · Pt with COPD, asthma, and continues to smoke  · Current meds:  advair (substitute with Breo), levalbuterol nebs Q6hrs prn, and theophyline  · Per records, she is also on 5mg of prednisone daily  Pt admits she is on daily prednisone  · Check A1c 2/2 chronic steroid use  · Lungs course and very diminished throughout - will check CXR  · Chronic 3L oxygen  · No c/o SOB at this time, and appears to be resting comfortable  · VS stable  Chronic respiratory failure (HCC)   Assessment & Plan    On chronic 3L oxygen  Currently at baseline  Continue current treatment as above  Acquired hypothyroidism   Assessment & Plan    · TSH for AM  · Continue current dose of synthroid for now  Tobacco use disorder, continuous   Assessment & Plan    · Counseled on cessation, in addition to the dangers of smoking with oxygen tank  · Nicotine patch ordered  GERD:  Continue PPI    VTE Prophylaxis: Reason for no pharmacologic prophylaxis Pt is ambulatory  / reason for no mechanical VTE prophylaxis On psychiatric unit     Recommendations for Discharge:  · Per primary team when stable  Counseling / Coordination of Care Time: 45 minutes  Greater than 50% of total time spent on patient counseling and coordination of care  Collaboration of Care:  Were Recommendations Directly Discussed with Primary Treatment Team? - Yes     History of Present Illness:    Violetta Villasenor is a 66 y o  female who is originally admitted to the psychiatry service due to paranoia and delusions  We are consulted for medical management  See above assessment and plan  She currently denies medical complaints  She is delusional, stating that she has a tractor in her arm  She is, however, alert and oriented to person place and time  She is aware of what medications she takes  Currently pleasant but hyperverbal     Review of Systems:    Review of Systems   Constitutional: Negative for chills and fever  HENT: Negative for congestion, rhinorrhea, sinus pain, sinus pressure, sneezing and sore throat  Respiratory: Negative  Cardiovascular: Negative  Gastrointestinal: Negative for abdominal distention, abdominal pain, constipation, diarrhea, nausea and vomiting  Genitourinary: Negative for difficulty urinating and dysuria  Musculoskeletal: Negative for arthralgias and myalgias  Neurological: Positive for weakness (generalized)  Negative for dizziness, facial asymmetry, speech difficulty and headaches  Psychiatric/Behavioral:        Delusional and hyper-verbal         Past Medical and Surgical History:     Past Medical History:   Diagnosis Date    Anxiety     COPD [facility]chronic obstructive pulmonary disease) ([facility] [address] )     Depression     GERD (gastroesophageal reflux disease)     Schizoaffective disorder ([facility])        No past surgical history on file  Meds/Allergies:    all medications and allergies reviewed    Allergies:    Allergies   Allergen Reactions    Antihistamines, Chlorpheniramine-Type     Aspirin     Atrovent [Ipratropium]     Elavil [Amitriptyline]     Levaquin [Levofloxacin]     Novocain [Procaine]     Penicillins     Prolixin [Fluphenazine]        Social History:     Marital Status: Single    Substance Use History:   History   Alcohol Use No     History   Smoking Status    Current Every Day Smoker    Packs/day: 0 25   Smokeless Tobacco    Never Used     History   Drug Use No       Family History:    non-contributory    Physical Exam:     Vitals:   Blood Pressure: 116/62 (01/15/19 1500)  Pulse: 97 (01/15/19 1500)  Temperature: 98 3 °F (36 8 °C) (01/15/19 1500)  Temp Source: Tympanic (01/15/19 1500)  Respirations: 18 (01/15/19 1500)  Height: 5' 3" (160 cm) (01/15/19 1500)  Weight - Scale: 66 1 kg (145 lb 11 6 oz) (01/15/19 1500)  SpO2: 97 % (01/15/19 1500)    Physical Exam   Constitutional: She is oriented to person, place, and time  She appears well-developed and well-nourished  No distress  HENT:   Head: Normocephalic and atraumatic  Eyes: Right eye exhibits no discharge  Left eye exhibits no discharge  Neck: No JVD present  Cardiovascular: Normal rate, regular rhythm, normal heart sounds and intact distal pulses  Exam reveals no gallop and no friction rub  No murmur heard  Pulmonary/Chest: No stridor  No respiratory distress  She has no wheezes  She has no rales  Course breath sounds, very diminished throughout  Abdominal: Soft  Bowel sounds are normal  She exhibits no distension  There is no tenderness  There is no rebound and no guarding  Musculoskeletal: She exhibits no edema  Neurological: She is alert and oriented to person, place, and time  Skin: She is not diaphoretic  Psychiatric:   Hyper-verbal - delusional - believes there is a tracker in her arm  Additional Data:     Lab Results: I have personally reviewed pertinent reports  Lab Results   Component Value Date/Time    HGBA1C 5 9 (H) 04/27/2015 05:30 AM    HGBA1C 6 3 (H) 01/23/2015 09:28 AM               Imaging: I have personally reviewed pertinent reports        XR chest pa & lateral    (Results Pending)       EKG, Pathology, and Other Studies Reviewed on Admission:   · EKG: ordered

## 2019-01-15 NOTE — PROGRESS NOTES
Pt is a 201 admission from [facility]  Pt currently resides at [place] where she is unhappy  Pt admitted for paranoid delusions and non compliant with medication  Pt reports depression, anxiety due to unhappiness of living situation  Pt denies SI/HI, A/V hallucinations  Pt paranoid about medications, procedures and living situation  Pt believes she has a Tracker implanted in her right arm  Pt is currently oriented to person, place, and time  Pt had a BM on 1/13/19  Pt has a PMH of Anxiety, COPD, Depression, GERD, Schizoaffective disorder, and oxygen dependence currently on 3 liters, current every day smoker, denies alcohol and drug use

## 2019-01-15 NOTE — PLAN OF CARE
Alteration in Thoughts and Perception     Treatment Goal: Gain control of psychotic behaviors/thinking, reduce/eliminate presenting symptoms and demonstrate improved reality functioning upon discharge Not Progressing          Anxiety     Anxiety is at manageable level Progressing        Depression     Treatment Goal: Demonstrate behavioral control of depressive symptoms, verbalize feelings of improved mood/affect, and adopt new coping skills prior to discharge Progressing     Verbalize thoughts and feelings Progressing     Refrain from harming self Progressing     Refrain from isolation Progressing     Refrain from self-neglect Progressing     Attend and participate in unit activities, including therapeutic, recreational, and educational groups Progressing     Complete daily ADLs, including personal hygiene independently, as able Progressing

## 2019-01-15 NOTE — ASSESSMENT & PLAN NOTE
· Management per psychiatry  · From personal care home, with documentation of schizoaffective and bipolar d/o  · Unhappy at her personal care home  · Brought in for paranoia and delusions  · She believes she has a tracker in her arm  · Labs and EKG pending:  CBC, CMP, A1c, RPR, TSH, lipid panel

## 2019-01-16 ENCOUNTER — APPOINTMENT (INPATIENT)
Dept: RADIOLOGY | Facility: HOSPITAL | Age: 64
DRG: 750 | End: 2019-01-16
Payer: COMMERCIAL

## 2019-01-16 PROBLEM — F25.0 SCHIZOAFFECTIVE DISORDER, BIPOLAR TYPE (HCC): Chronic | Status: ACTIVE | Noted: 2019-01-15

## 2019-01-16 LAB — GLUCOSE SERPL-MCNC: 114 MG/DL (ref 70–99)

## 2019-01-16 PROCEDURE — 94760 N-INVAS EAR/PLS OXIMETRY 1: CPT

## 2019-01-16 PROCEDURE — 94640 AIRWAY INHALATION TREATMENT: CPT

## 2019-01-16 PROCEDURE — 82948 REAGENT STRIP/BLOOD GLUCOSE: CPT

## 2019-01-16 PROCEDURE — 99222 1ST HOSP IP/OBS MODERATE 55: CPT | Performed by: PSYCHIATRY & NEUROLOGY

## 2019-01-16 RX ORDER — SODIUM CHLORIDE FOR INHALATION 0.9 %
3 VIAL, NEBULIZER (ML) INHALATION EVERY 6 HOURS PRN
Status: DISCONTINUED | OUTPATIENT
Start: 2019-01-16 | End: 2019-01-17

## 2019-01-16 RX ORDER — ARIPIPRAZOLE 2 MG/1
2 TABLET ORAL DAILY
Status: DISCONTINUED | OUTPATIENT
Start: 2019-01-16 | End: 2019-01-18

## 2019-01-16 RX ORDER — LITHIUM CARBONATE 300 MG/1
300 TABLET, FILM COATED, EXTENDED RELEASE ORAL EVERY 12 HOURS SCHEDULED
Status: DISCONTINUED | OUTPATIENT
Start: 2019-01-16 | End: 2019-01-23

## 2019-01-16 RX ADMIN — ISODIUM CHLORIDE 3 ML: 0.03 SOLUTION RESPIRATORY (INHALATION) at 13:09

## 2019-01-16 RX ADMIN — LEVALBUTEROL HYDROCHLORIDE 1.25 MG: 1.25 SOLUTION, CONCENTRATE RESPIRATORY (INHALATION) at 13:09

## 2019-01-16 RX ADMIN — THEOPHYLLINE ANHYDROUS 200 MG: 200 CAPSULE, EXTENDED RELEASE ORAL at 08:51

## 2019-01-16 RX ADMIN — LEVALBUTEROL HYDROCHLORIDE 1.25 MG: 1.25 SOLUTION, CONCENTRATE RESPIRATORY (INHALATION) at 04:56

## 2019-01-16 RX ADMIN — PREDNISONE 5 MG: 5 TABLET ORAL at 08:51

## 2019-01-16 RX ADMIN — LITHIUM CARBONATE EXTENDED-RELEASE TABLET 300 MG: 300 TABLET, FILM COATED, EXTENDED RELEASE ORAL at 13:39

## 2019-01-16 RX ADMIN — ARIPIPRAZOLE 2 MG: 2 TABLET ORAL at 13:33

## 2019-01-16 RX ADMIN — LEVOTHYROXINE SODIUM 125 MCG: 125 TABLET ORAL at 06:15

## 2019-01-16 RX ADMIN — PANTOPRAZOLE SODIUM 20 MG: 20 TABLET, DELAYED RELEASE ORAL at 06:15

## 2019-01-16 RX ADMIN — NICOTINE 14 MG: 14 PATCH, EXTENDED RELEASE TRANSDERMAL at 08:48

## 2019-01-16 NOTE — PROGRESS NOTES
Xray called to find out if patient was ready to go down for xray  Spoke with patient she is currently refusing   Informed xray and SLIM

## 2019-01-16 NOTE — CASE MANAGEMENT
Left  for pt's brother, Kassy Ramsey [phone] to gather more information at pt's hx and current living situation

## 2019-01-16 NOTE — PROGRESS NOTES
Informed patient that Dr Guero Alaniz  Wants ekg  She said she will comply after she eats lunch and her visitors left
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
## 2019-01-16 NOTE — PROGRESS NOTES
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

## 2019-01-16 NOTE — PROGRESS NOTES
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

## 2019-01-16 NOTE — H&P
Psychiatric Evaluation - Behavioral Health     Identification Data:Maggie Rubin 66 y o  female MRN: [id number]  Unit/Bed#: [facility] 640-20 Encounter: [id number]    Chief Complaint:   Delusions of persecution  History of present illness:  Patient is a 66years old   female with history of a schizoaffective disorder who presents with delusions of persecution  She believes that people at the group home are trying to sabotage her and spreading rumors about her and she also believes that they are playing back recordings of her  jennifer's voice and she also believes that there is a tractor in her forearm that can monitor her within a 25 miles radius  She was unhappy with advanced practitioner who was following her for psychiatric treatment and she is also quite unhappy with her current living arrangements and as a result, she stopped taking all her medications  She endorses neurovegetative signs of depression mostly sleep and appetite disturbance and change in energy level and anhedonia  She does have hopelessness but no suicidal tendencies  Psychiatric Review Of Systems:  Change in sleep: yes  Appetite changes: yes  Weight changes: unknown  Change in energy/anergy: yes  Change in interest/pleasure/anhedonia: yes  Somatic symptoms: no  Anxiety/panic: no  Manic symptoms: no  Guilt feelings:no  Hopeless: yes  Self injurious behavior/risky behavior: no    Historical Information     Past Psychiatric History:    The age of onset and the circumstances are not known and patient is not a good historian although she reports an episode of post-partum depression in  which was her first episode  She has had a number of inpatient admissions  She reports that her last admission was  or   He carries a diagnosis of schizoaffective disorder  She denies history of prior suicide attempts  The number and location of any hospitalizations are not known    Dr Eloisa Walden as well as other psychiatrists have been listed as one of her psychiatrists in the past Good response to lithium in the past  Did not tolerate Depakote  Substance Abuse History:  5-10 cigs a day   No drugs or alcohol  Family Psychiatric History:   Maternal great grandmother committed suicide  Social History:   Developmental:Uneventful  Education: some college  Marital history:   Living arrangement, social support: Lives in SNF/[facility]-One child  Occupational History: on permanent disability  Access to firearms:No    Traumatic History:   Abuse:none is reported  Other Traumatic Events: none    Past Medical History:   Diagnosis Date    Anxiety     COPD (chronic obstructive pulmonary disease) ([facility] )     Depression     GERD (gastroesophageal reflux disease)     Schizoaffective disorder ([facility] )        Medical Review Of Systems:  Pertinent items are noted in HPI  Meds/Allergies   all current active meds have been reviewed  Allergies   Allergen Reactions    Antihistamines, Chlorpheniramine-Type     Aspirin     Atrovent [Ipratropium]     Elavil [Amitriptyline]     Levaquin [Levofloxacin]     Novocain [Procaine]     Penicillins     Prolixin [Fluphenazine]      Objective      Mental Status Evaluation:  Appearance:  {Good eye contact and disheveled-Appears older than stated age   Behavior:  cooperative-Somewhat grandiose   Speech:   Language Mildly pressured  No overt abnormality   Mood:  Variable and labile   Affect: Thought process Tearful and labile  Tangential   Associations: Loosely connected   Thought Content:  Paranoid and mistrustful and Delusions of persecution   Perceptual Disturbances:  Auditory hallucinations without commands   Risk Potential: No SI or HI but hopelessnss   Orientation  Oriented x 3   Memory grossly intact   Attention/Concentration attention span and concentration were age appropriate   Fund of knowledge vocabulary Average   Insight:  No insight   Judgment: Poor judgment   Gait/Station: normal gait/station and normal balance   Motor Activity: No abnormal movement noted         Lab Results: I have personally reviewed pertinent lab results  Imaging Studies: No pertinent data  EKG, Pathology, and Other Studies:   Pending  Code Status:Full code    Patient Strengths/Assets: average or above intelligence, cooperative    Patient Barriers/Limitations: limited family ties, noncompliant with medication, noncompliant with treatment    Assessment/Plan     Principal Problem:    Schizoaffective disorder, bipolar type ([facility] )  Active Problems:    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[facility] )    Tobacco use disorder, continuous    Chronic respiratory failure ([place] [address] )    Acquired hypothyroidism    Gastroesophageal reflux disease without esophagitis    Plan: Will start Abilify and lithium-Check EKG  Risks, benefits and possible side effects of Medications:   Patient does not verbalize understanding at this time and will require further explanation

## 2019-01-16 NOTE — PLAN OF CARE
Problem: Ineffective Coping  Goal: Participates in unit activities  Interventions:  - Provide therapeutic environment   - Provide required programming   - Redirect inappropriate behaviors    Outcome: Not Progressing  Pt not engaging in groups yet  Her focus in self assessment interview is to rest both mentally and physically and find a new place to live  Will encourage pt to engage in unit groups tomorrow to provide healthy coping options

## 2019-01-16 NOTE — PLAN OF CARE

## 2019-01-16 NOTE — PROGRESS NOTES
Dr Cy Hill is requesting gregg and sergio for this patient  Informed kitchen and they will bring it up

## 2019-01-17 ENCOUNTER — APPOINTMENT (INPATIENT)
Dept: RADIOLOGY | Facility: HOSPITAL | Age: 64
DRG: 750 | End: 2019-01-17
Payer: COMMERCIAL

## 2019-01-17 ENCOUNTER — APPOINTMENT (INPATIENT)
Dept: CT IMAGING | Facility: HOSPITAL | Age: 64
DRG: 750 | End: 2019-01-17
Payer: COMMERCIAL

## 2019-01-17 LAB
ALBUMIN SERPL BCP-MCNC: 3.7 G/DL (ref 3–5.2)
ALP SERPL-CCNC: 89 U/L (ref 43–122)
ALT SERPL W P-5'-P-CCNC: 21 U/L (ref 9–52)
ANION GAP SERPL CALCULATED.3IONS-SCNC: 6 MMOL/L (ref 5–14)
AST SERPL W P-5'-P-CCNC: 17 U/L (ref 14–36)
BASOPHILS # BLD AUTO: 0.1 THOUSANDS/ΜL (ref 0–0.1)
BASOPHILS NFR BLD AUTO: 1 % (ref 0–1)
BILIRUB SERPL-MCNC: 0.3 MG/DL
BUN SERPL-MCNC: 12 MG/DL (ref 5–25)
CALCIUM SERPL-MCNC: 9.4 MG/DL (ref 8.4–10.2)
CHLORIDE SERPL-SCNC: 105 MMOL/L (ref 97–108)
CHOLEST SERPL-MCNC: 212 MG/DL
CO2 SERPL-SCNC: 28 MMOL/L (ref 22–30)
CREAT SERPL-MCNC: 0.7 MG/DL (ref 0.6–1.2)
EOSINOPHIL # BLD AUTO: 0 THOUSAND/ΜL (ref 0–0.4)
EOSINOPHIL NFR BLD AUTO: 1 % (ref 0–6)
ERYTHROCYTE [DISTWIDTH] IN BLOOD BY AUTOMATED COUNT: 13.5 %
EST. AVERAGE GLUCOSE BLD GHB EST-MCNC: 111 MG/DL
GFR SERPL CREATININE-BSD FRML MDRD: 94 ML/MIN/1.73SQ M
GLUCOSE SERPL-MCNC: 124 MG/DL (ref 70–99)
HBA1C MFR BLD: 5.5 % (ref 4.2–6.3)
HCT VFR BLD AUTO: 41.9 % (ref 36–46)
HDLC SERPL-MCNC: 56 MG/DL (ref 40–59)
HGB BLD-MCNC: 13.4 G/DL (ref 12–16)
LDLC SERPL CALC-MCNC: 134 MG/DL
LYMPHOCYTES # BLD AUTO: 1.2 THOUSANDS/ΜL (ref 0.5–4)
LYMPHOCYTES NFR BLD AUTO: 12 % (ref 25–45)
MCH RBC QN AUTO: 31 PG (ref 26–34)
MCHC RBC AUTO-ENTMCNC: 32 G/DL (ref 31–36)
MCV RBC AUTO: 97 FL (ref 80–100)
MONOCYTES # BLD AUTO: 0.4 THOUSAND/ΜL (ref 0.2–0.9)
MONOCYTES NFR BLD AUTO: 5 % (ref 1–10)
NEUTROPHILS # BLD AUTO: 7.8 THOUSANDS/ΜL (ref 1.8–7.8)
NEUTS SEG NFR BLD AUTO: 82 % (ref 45–65)
NONHDLC SERPL-MCNC: 156 MG/DL
PLATELET # BLD AUTO: 204 THOUSANDS/UL (ref 150–450)
PMV BLD AUTO: 10.4 FL (ref 8.9–12.7)
POTASSIUM SERPL-SCNC: 4.4 MMOL/L (ref 3.6–5)
PROT SERPL-MCNC: 6.8 G/DL (ref 5.9–8.4)
RBC # BLD AUTO: 4.33 MILLION/UL (ref 4–5.2)
SODIUM SERPL-SCNC: 139 MMOL/L (ref 137–147)
TRIGL SERPL-MCNC: 112 MG/DL
TSH SERPL DL<=0.05 MIU/L-ACNC: 3.28 UIU/ML (ref 0.47–4.68)
WBC # BLD AUTO: 9.5 THOUSAND/UL (ref 4.5–11)

## 2019-01-17 PROCEDURE — 86592 SYPHILIS TEST NON-TREP QUAL: CPT | Performed by: NURSE PRACTITIONER

## 2019-01-17 PROCEDURE — 83036 HEMOGLOBIN GLYCOSYLATED A1C: CPT | Performed by: NURSE PRACTITIONER

## 2019-01-17 PROCEDURE — 99232 SBSQ HOSP IP/OBS MODERATE 35: CPT | Performed by: NURSE PRACTITIONER

## 2019-01-17 PROCEDURE — 94760 N-INVAS EAR/PLS OXIMETRY 1: CPT

## 2019-01-17 PROCEDURE — 85025 COMPLETE CBC W/AUTO DIFF WBC: CPT | Performed by: NURSE PRACTITIONER

## 2019-01-17 PROCEDURE — 80061 LIPID PANEL: CPT | Performed by: NURSE PRACTITIONER

## 2019-01-17 PROCEDURE — 84443 ASSAY THYROID STIM HORMONE: CPT | Performed by: PSYCHIATRY & NEUROLOGY

## 2019-01-17 PROCEDURE — 94640 AIRWAY INHALATION TREATMENT: CPT

## 2019-01-17 PROCEDURE — 99232 SBSQ HOSP IP/OBS MODERATE 35: CPT | Performed by: FAMILY MEDICINE

## 2019-01-17 PROCEDURE — 93005 ELECTROCARDIOGRAM TRACING: CPT

## 2019-01-17 PROCEDURE — 71250 CT THORAX DX C-: CPT

## 2019-01-17 PROCEDURE — 80053 COMPREHEN METABOLIC PANEL: CPT | Performed by: NURSE PRACTITIONER

## 2019-01-17 PROCEDURE — 71045 X-RAY EXAM CHEST 1 VIEW: CPT

## 2019-01-17 RX ORDER — ALBUTEROL SULFATE 90 UG/1
2 AEROSOL, METERED RESPIRATORY (INHALATION)
Status: DISCONTINUED | OUTPATIENT
Start: 2019-01-17 | End: 2019-01-17

## 2019-01-17 RX ORDER — ALBUTEROL SULFATE 90 UG/1
2 AEROSOL, METERED RESPIRATORY (INHALATION)
Status: DISCONTINUED | OUTPATIENT
Start: 2019-01-17 | End: 2019-03-14

## 2019-01-17 RX ORDER — SODIUM CHLORIDE FOR INHALATION 0.9 %
3 VIAL, NEBULIZER (ML) INHALATION EVERY 8 HOURS PRN
Status: DISCONTINUED | OUTPATIENT
Start: 2019-01-17 | End: 2019-03-20 | Stop reason: HOSPADM

## 2019-01-17 RX ORDER — LEVALBUTEROL 1.25 MG/.5ML
1.25 SOLUTION, CONCENTRATE RESPIRATORY (INHALATION) EVERY 8 HOURS PRN
Status: DISCONTINUED | OUTPATIENT
Start: 2019-01-17 | End: 2019-03-20 | Stop reason: HOSPADM

## 2019-01-17 RX ADMIN — LITHIUM CARBONATE EXTENDED-RELEASE TABLET 300 MG: 300 TABLET, FILM COATED, EXTENDED RELEASE ORAL at 08:57

## 2019-01-17 RX ADMIN — LEVALBUTEROL HYDROCHLORIDE 1.25 MG: 1.25 SOLUTION, CONCENTRATE RESPIRATORY (INHALATION) at 06:28

## 2019-01-17 RX ADMIN — LEVALBUTEROL HYDROCHLORIDE 1.25 MG: 1.25 SOLUTION, CONCENTRATE RESPIRATORY (INHALATION) at 17:13

## 2019-01-17 RX ADMIN — PANTOPRAZOLE SODIUM 20 MG: 20 TABLET, DELAYED RELEASE ORAL at 06:25

## 2019-01-17 RX ADMIN — LITHIUM CARBONATE EXTENDED-RELEASE TABLET 300 MG: 300 TABLET, FILM COATED, EXTENDED RELEASE ORAL at 21:46

## 2019-01-17 RX ADMIN — ALBUTEROL SULFATE 2 PUFF: 90 AEROSOL, METERED RESPIRATORY (INHALATION) at 13:34

## 2019-01-17 RX ADMIN — PREDNISONE 5 MG: 5 TABLET ORAL at 08:58

## 2019-01-17 RX ADMIN — LEVOTHYROXINE SODIUM 125 MCG: 125 TABLET ORAL at 06:25

## 2019-01-17 RX ADMIN — LEVALBUTEROL HYDROCHLORIDE 1.25 MG: 1.25 SOLUTION, CONCENTRATE RESPIRATORY (INHALATION) at 23:40

## 2019-01-17 RX ADMIN — NICOTINE 14 MG: 14 PATCH, EXTENDED RELEASE TRANSDERMAL at 09:01

## 2019-01-17 RX ADMIN — ALBUTEROL SULFATE 2 PUFF: 90 AEROSOL, METERED RESPIRATORY (INHALATION) at 09:00

## 2019-01-17 RX ADMIN — THEOPHYLLINE ANHYDROUS 200 MG: 200 CAPSULE, EXTENDED RELEASE ORAL at 08:58

## 2019-01-17 NOTE — PLAN OF CARE
Problem: Ineffective Coping  Goal: Participates in unit activities  Interventions:  - Provide therapeutic environment   - Provide required programming   - Redirect inappropriate behaviors    Outcome: Progressing  Pt  Attended community meeting displaying an irritable demeanor  She states that she will not discuss emotions nor physical health in groups  Pt refused all other groups today

## 2019-01-17 NOTE — PROGRESS NOTES
Pt declined HS dosage of Lithium  Stated she is unsure if she should be taking this particular drug because she felt sick after this mornings dose with racing heartbeat and generally ill  Talked with pt about anxiety and compliance and encouraged to speak with MD in the morning

## 2019-01-17 NOTE — PROGRESS NOTES
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  Patient making derogatory statements about people on the unit  Patient does report good sleep, appetite and energy level today  Patient refused Abilify stating it makes her anxious  Staff reported patient also randomly refuses Lithium for the same reason  Patient refused chest xray but told this writer she received it and has a nodule on her lung  Denies pain  Mental Status Evaluation:  Appearance:  Good eye contact and disheveled   Behavior:  calm, cooperative and friendly   Mood:  anxious   Affect: constricted   Speech: Normal rate and Normal volume   Thought Process:   Tangential   Thought Content:  Paranoid and mistrustful and Delusions of persecution   Perceptual Disturbances: Denies hallucinations and does not appear to be responding to internal stimuli   Risk Potential: No suicidal or homicidal ideation   Attention/Concentration Decreased   Orientation:   Oriented x3   Gait/Station: Not observed   Motor Activity: Dyskinetic movements     Progress Toward Goals: No change    Assessment/Plan    Principal Problem:    Schizoaffective disorder, bipolar type ([facility])  Active Problems:    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[facility])    Tobacco use disorder, continuous    Chronic respiratory failure ([facility])    Acquired hypothyroidism    Gastroesophageal reflux disease without esophagitis      Recommended Treatment: Continue Abilify 2mg, daily  Continue Lithium 300mg, Q12HR  Will draw [facility] lab in one week  Continue with pharmacotherapy, group therapy, milieu therapy and occupational therapy  The patient will be maintained on the following medications:    Current Facility-Administered Medications:  acetaminophen 650 mg Oral Q6H PRN Rozella Hesselbach, CRNP   acetaminophen 650 mg Oral Q4H PRN Rozella Hesselbach, CRNP   acetaminophen 975 mg Oral Q6H PRN Rozella Kobeelbach, CRNP   albuterol 2 puff Inhalation BID Richie Roger MD   aluminum-magnesium hydroxide-simethicone 30 mL Oral Q4H PRN Ariana Kaurbach, CRNP   ARIPiprazole 2 mg Oral Daily Richie Roger MD   benztropine 1 mg Intramuscular BID PRN Ariana Kaurbach, CRNP   benztropine 1 mg Oral BID PRN Ariana Kaurbach, CRNP   EPINEPHrine PF 0 15 mg Intramuscular PRN Vicky Ciminieri, CRNP   fluticasone-salmeterol 1 puff Inhalation Q12H [facility] Mirtha Godinez PA-C   hydrOXYzine HCL 25 mg Oral Q6H PRN Ariana Kaurbach, CRNP   levalbuterol 1 25 mg Nebulization Q6H PRN Vicky Ciminifaisal, CRNP   levothyroxine 125 mcg Oral Daily Jagruti Dapper, CRNP   lithium carbonate 300 mg Oral Q12H [facility] Richie Roger MD   LORazepam 1 mg Intramuscular Q4H PRN Ibiszelnataly Bullockelbach, CRNP   LORazepam 0 5 mg Oral Q4H PRN Ariana Bullockelbach, CRNP   magnesium hydroxide 30 mL Oral Daily PRN Ariana Bullockelbach, CRNP   nicotine 14 mg Transdermal Daily Jagruti Dapper, CRNP   nicotine polacrilex 2 mg Oral Q2H PRN Roellie Bullockelbach, CRNP   pantoprazole 20 mg Oral Daily Vicky Ciminieri, CRNP   predniSONE 5 mg Oral Daily Jagruti Dapper, CRNP   sodium chloride 3 mL Nebulization Q6H PRN Richie Roger MD   theophylline 200 mg Oral Daily Jagruti Dapper, CRNP   traZODone 25 mg Oral HS PRN Ibiszelnataly Whiteside, CRNP       Risks, benefits and possible side effects of Medications:   Patient does not verbalize understanding at this time and will require further explanation         ABILIO Sandoval

## 2019-01-17 NOTE — RESPIRATORY THERAPY NOTE
RT Protocol Note  Sen Garvey 66 y o  female MRN: [id number]  Unit/Bed#: [facility] 890-49 Encounter: [id number]    Assessment    Principal Problem:    Schizoaffective disorder, bipolar type ([place] [address] )  Active Problems:    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[place] [address] )    Tobacco use disorder, continuous    Chronic respiratory failure ([place] [address] )    Acquired hypothyroidism    Gastroesophageal reflux disease without esophagitis      Home Pulmonary Medications:  Home brocodilatior  Home Devices/Therapy: Other (Comment)    Past Medical History:   Diagnosis Date    Anxiety     COPD [facility]ch[facility] disease) [facility]     Depression     GERD (gastroesophageal reflux disease)     Schizoaffective disorder ([facility])      Social History     Social History    Marital status: Single     Spouse name: N/A    Number of children: N/A    Years of education: N/A     Social History Main Topics    Smoking status: Current Every Day Smoker     Packs/day: 0 25    Smokeless tobacco: Never Used    Alcohol use No    Drug use: No    Sexual activity: Not Asked     Other Topics Concern    None     Social History Narrative    None       Subjective    Subjective Data: PRN    Objective    Physical Exam:        Vitals:  Blood pressure 120/72, pulse 86, temperature 97 9 °F (36 6 °C), temperature source Tympanic, resp  rate 18, height 5' 3" (1 6 m), weight 66 1 kg (145 lb 11 6 oz), SpO2 97 %  Imaging and other studies: I have personally reviewed pertinent reports  O2 Device: NC 3LPM     Plan    Respiratory Plan: No distress/Pulmonary history    Pt is protocol for tid ventolin inhaler as done at home  PT was asseses currently and pt has no respiratory distress  Pt is eating her lunch and has talked to the nurse about her information  Pt stated she is not having sob  She wanted make sure she has a prn neb in her chart  Pt is bit argumentive with her nurses and rt and        Resp Comments: Pt is comfertable , no respiratory distress b/l breath sounds clear

## 2019-01-17 NOTE — ASSESSMENT & PLAN NOTE
· Pt with COPD, asthma, and continues to smoke  · Current meds:  advair (substitute with Breo), levalbuterol nebs Q6hrs prn, and theophyline  · Per records, she is also on 5mg of prednisone daily  Pt admits she is on daily prednisone  · Chronic 3L oxygen  · Chest x-ray shows emphysema and also right 3 cm nodular density  Will do a CT chest without contrast for further evaluation  · Patient states that she had a lung biopsy done in 2008 which did not show cancer

## 2019-01-17 NOTE — PROGRESS NOTES
Pt refused Abilify  Stated it caused her to feel anxious, have tachycardia and felt nauseous  Pt states she will discuss with physician

## 2019-01-17 NOTE — PROGRESS NOTES
Pt calm, cooperative and medication compliant  Pt admits to mild-moderate anxiety but denies any other symptoms  Pt is visible in milieu and social among select staff  Pt on oxygen 3 L via NC    Chest xray done this am
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
## 2019-01-17 NOTE — CASE MANAGEMENT
Left another  for pt's brother, Carmita Job [phone]    Spoke pt's sister, Alyssa Trent [phone] who stated that pt used to have [facility] when she lived in [place] [address] about 5-6 years ago  She then moved into [facility] for 2 years, then [employer] for 2 years, and then [facility] ([facility])  Alyssa Roap states that pt is not receiving the support and structure she needs to remain consistent with her meds/MH care  Pt wants to remain in [facility] and find housing that supports her [address] and physical/medical needs  Alyssa Trent brought her to [facility] on Sunday as a way to have her assessed in [facility] and receive help with placement into a [facility] program for housing  She has lived with family members from time to time over the years but it is not feasible at this time  Pt has been at [facility] at least 2 times in the last 20 years -- 1 of the times was court ordered and she was there for 3 weeks  Alyssa Trent is hoping that pt will be in the hospital long enough for pt's benefits/residency to switch into [facility]  CM let her know that this is a short-term tx unit and the process for switching benefits/finding housing can be lengthy so the pt and family will need to prepare for alternative options  Alyssa Trent thinks that pt needs a group home environment that can support her with physical/mental health  Pt has not been independent with cooking, laundry, etc      Pt was on lithium a long time ago

## 2019-01-18 PROBLEM — R93.89 ABNORMAL CT OF THE CHEST: Status: ACTIVE | Noted: 2019-01-18

## 2019-01-18 LAB
ATRIAL RATE: 74 BPM
P AXIS: 78 DEGREES
PR INTERVAL: 160 MS
QRS AXIS: -58 DEGREES
QRSD INTERVAL: 90 MS
QT INTERVAL: 404 MS
QTC INTERVAL: 448 MS
RPR SER QL: NORMAL
T WAVE AXIS: 54 DEGREES
VENTRICULAR RATE: 74 BPM

## 2019-01-18 PROCEDURE — 99232 SBSQ HOSP IP/OBS MODERATE 35: CPT | Performed by: PSYCHIATRY & NEUROLOGY

## 2019-01-18 PROCEDURE — 94760 N-INVAS EAR/PLS OXIMETRY 1: CPT

## 2019-01-18 PROCEDURE — 99232 SBSQ HOSP IP/OBS MODERATE 35: CPT | Performed by: NURSE PRACTITIONER

## 2019-01-18 PROCEDURE — 93010 ELECTROCARDIOGRAM REPORT: CPT | Performed by: INTERNAL MEDICINE

## 2019-01-18 PROCEDURE — 99254 IP/OBS CNSLTJ NEW/EST MOD 60: CPT | Performed by: INTERNAL MEDICINE

## 2019-01-18 PROCEDURE — 94640 AIRWAY INHALATION TREATMENT: CPT

## 2019-01-18 RX ORDER — QUETIAPINE FUMARATE 50 MG/1
50 TABLET, FILM COATED ORAL
Status: DISCONTINUED | OUTPATIENT
Start: 2019-01-18 | End: 2019-01-20

## 2019-01-18 RX ADMIN — LEVALBUTEROL HYDROCHLORIDE 1.25 MG: 1.25 SOLUTION, CONCENTRATE RESPIRATORY (INHALATION) at 20:05

## 2019-01-18 RX ADMIN — LEVOTHYROXINE SODIUM 125 MCG: 125 TABLET ORAL at 06:47

## 2019-01-18 RX ADMIN — THEOPHYLLINE ANHYDROUS 200 MG: 200 CAPSULE, EXTENDED RELEASE ORAL at 09:55

## 2019-01-18 RX ADMIN — ALBUTEROL SULFATE 2 PUFF: 90 AEROSOL, METERED RESPIRATORY (INHALATION) at 14:52

## 2019-01-18 RX ADMIN — QUETIAPINE FUMARATE 50 MG: 50 TABLET ORAL at 21:30

## 2019-01-18 RX ADMIN — PANTOPRAZOLE SODIUM 20 MG: 20 TABLET, DELAYED RELEASE ORAL at 06:47

## 2019-01-18 RX ADMIN — LEVALBUTEROL HYDROCHLORIDE 1.25 MG: 1.25 SOLUTION, CONCENTRATE RESPIRATORY (INHALATION) at 10:28

## 2019-01-18 RX ADMIN — PREDNISONE 5 MG: 5 TABLET ORAL at 09:55

## 2019-01-18 RX ADMIN — ISODIUM CHLORIDE 3 ML: 0.03 SOLUTION RESPIRATORY (INHALATION) at 12:46

## 2019-01-18 RX ADMIN — LITHIUM CARBONATE EXTENDED-RELEASE TABLET 300 MG: 300 TABLET, FILM COATED, EXTENDED RELEASE ORAL at 09:55

## 2019-01-18 RX ADMIN — LITHIUM CARBONATE EXTENDED-RELEASE TABLET 300 MG: 300 TABLET, FILM COATED, EXTENDED RELEASE ORAL at 21:30

## 2019-01-18 RX ADMIN — ARIPIPRAZOLE 2 MG: 2 TABLET ORAL at 09:55

## 2019-01-18 RX ADMIN — NICOTINE 14 MG: 14 PATCH, EXTENDED RELEASE TRANSDERMAL at 09:58

## 2019-01-18 NOTE — PROGRESS NOTES
Patient cooperative and compliant with medications  Patient refusing wes Alexander it makes her nausea  She denies all symptoms  Present in the milieu during meals  Did not attend afternoon group therapy

## 2019-01-18 NOTE — DISCHARGE INSTRUCTIONS
1  Abnormal CT chest; RML atelectasis  · Abnormality was not present on CT chest in 2016  · Differential includes mucous plug vs maligancy vs benign obstruction etc   · She would benefit from bronchoscopy but she is refusing all procedures  Given her psychiatric disorders, reasonable to repeat her CT chest without contrast in 4-6 weeks to ensure resolution of the abnormality  If it persists, she will require bronchoscopy  2  Tobacco dependence syndrome; emphysema; COPD  · Tobacco cessation  · She would benefit from outpatient follow up for PFTs and yearly lung cancer screening  3  Chronic hypoxemic respiratory failure  1  Cont  oxygen for pulse ox 88-92%

## 2019-01-18 NOTE — PROGRESS NOTES
Progress Note - Behavioral Health   Terra Kan 66 y o  female MRN: [id number]  Unit/Bed#: Bonilla Vann 030-72 Encounter: [id number]    The patient was seen for continuing care and reviewed with treatment team  Patient is pleasant and cooperative upon approach  Patient continues to be tangential in conversation with paranoid thoughts stating there are wires in the wall that listen to her and they need to be removed  She is unable to rate her depression but states she has "some" but reports moderate anxiety in the morning upon awakening  Patient reports adequate appetite and energy level  She reports poor sleep d/t her roommate randomly yelling out throughout the night  Denies pain  Patient continues to refuse Abilify stating it makes her very anxious and her heart race  Patient stated Seroquel has helped in the past however, patient QTc prolonged in past when on that medication  Patient is agreeable to take the Lithium and clonazepam      Mental Status Evaluation:  Appearance:  Good eye contact and disheveled   Behavior:  calm, cooperative and friendly   Mood:  depressed   Affect: broad   Speech: Normal rate and Normal volume   Thought Process: Tangential   Thought Content:  Paranoid and mistrustful   Perceptual Disturbances: Denies hallucinations and does not appear to be responding to internal stimuli   Risk Potential: No suicidal or homicidal ideation   Attention/Concentration Decreased   Orientation:   Oriented x3   Gait/Station: Not observed   Motor Activity: No abnormal movement noted     Progress Toward Goals: No change    Assessment/Plan    Principal Problem:    Schizoaffective disorder, bipolar type (HCC)  Active Problems:    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HCC)    Tobacco use disorder, continuous    Chronic respiratory failure (HCC)    Acquired hypothyroidism    Gastroesophageal reflux disease without esophagitis      Recommended Treatment: Continue Lithium 300mg, Q12HR  Will draw Lithium lab in 1 week   Disconintue Abilify 2mg, daily  Will consider adding clonazepam  Continue with pharmacotherapy, group therapy, milieu therapy and occupational therapy  The patient will be maintained on the following medications:    Current Facility-Administered Medications:  acetaminophen 650 mg Oral Q6H PRN Duwaine Maffucci, CRNP   acetaminophen 650 mg Oral Q4H PRN Duwaine Maffucci, CRNP   acetaminophen 975 mg Oral Q6H PRN Duwaine Maffucci, CRNP   albuterol 2 puff Inhalation TID Jerome Rey MD   aluminum-magnesium hydroxide-simethicone 30 mL Oral Q4H PRN Duwaine Maffucci, CRNP   ARIPiprazole 2 mg Oral Daily Jerome Rey MD   benztropine 1 mg Intramuscular BID PRN Duwaine Maffucci, CRNP   benztropine 1 mg Oral BID PRN Duwaine Maffucci, CRNP   enoxaparin 40 mg Subcutaneous Q24H [address] Anna Arana MD   EPINEPHrine PF 0 15 mg Intramuscular PRN Mayte Fields, ABILIO   fluticasone-salmeterol 1 puff Inhalation Q12H [address] Benedicto Godinez PA-C   hydrOXYzine HCL 25 mg Oral Q6H PRN Duwaine Maffucci, CRNP   levalbuterol 1 25 mg Nebulization Q8H PRN Jerome Rey MD   Or       sodium chloride 3 mL Nebulization Q8H PRN Jerome Rey MD   levothyroxine 125 mcg Oral Daily Vicky Donnie, JOSE CARLOSNP   lithium carbonate 300 mg Oral Q12H [address] Jerome Rey MD   LORazepam 1 mg Intramuscular Q4H PRN Duwaine Maffucci, CRNP   LORazepam 0 5 mg Oral Q4H PRN Duwaine Maffucci, CRNP   magnesium hydroxide 30 mL Oral Daily PRN Duwaine Maffucci, CRNP   nicotine 14 mg Transdermal Daily Donelda Josué, CRNP   nicotine polacrilex 2 mg Oral Q2H PRN Duwaine Maffucci, CRNP   pantoprazole 20 mg Oral Daily Vicky Ciminieri, CRNP   predniSONE 5 mg Oral Daily Donelda Josué, CRNP   theophylline 200 mg Oral Daily Donelda Josué, CRNP   traZODone 25 mg Oral HS PRN Duwaine Maffucci, CRNP       Risks, benefits and possible side effects of Medications:   Risks, benefits, and possible side effects of medications explained to patient and patient verbalizes understanding         Marylee Redden Tara Young

## 2019-01-18 NOTE — ASSESSMENT & PLAN NOTE
CTA chest:  Right middle lobe atelectasis and complete collapse of the right middle lobe with a few air bronchograms  Abnormality was not present on CT in 2016  · Pulmonology consulted, appreciate input  · Differentials include mucus plug versus malignancy versus benign obstruction  · Pulmonology recommended bronchoscopy but patient refusing all procedures  · Given her psychiatric disorders, pulmonology thinks it is reasonable to repeat her CT chest without contrast in 4-6 weeks to ensure resolution of the abnormality  · If it persists, she will require bronchoscopy

## 2019-01-18 NOTE — PROGRESS NOTES
Progress Note - Erika Titus 1955, 66 y o  female MRN: [id number]    Unit/Bed#: Rollen Hodgkin 222-76 Encounter: [id number]    Primary Care Provider: Corbin Medina MD   Date and time admitted to hospital: 1/15/2019  3:40 PM        Abnormal CT of the chest   Assessment & Plan    CTA chest:  Right middle lobe atelectasis and complete collapse of the right middle lobe with a few air bronchograms  Abnormality was not present on CT in 2016  · Pulmonology consulted, appreciate input  · Differentials include mucus plug versus malignancy versus benign obstruction  · Pulmonology recommended bronchoscopy but patient refusing all procedures  · Given her psychiatric disorders, pulmonology thinks it is reasonable to repeat her CT chest without contrast in 4-6 weeks to ensure resolution of the abnormality  · If it persists, she will require bronchoscopy     Tobacco use disorder, continuous   Assessment & Plan    · Counseled on cessation, in addition to the dangers of smoking with oxygen tank  · Nicotine patch ordered  · Patient not interested in smoking cessation  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[facility] [address] )   Assessment & Plan    · Pt with COPD, asthma, and continues to smoke  · Current meds:  advair (substitute with Breo), levalbuterol nebs Q6hrs prn, and theophyline  · Per records, she is also on 5mg of prednisone daily  Pt admits she is on daily prednisone  · Chronic 3L oxygen  · CXR: Shows emphysema and also right 3 cm nodular density  · Patient states that she had a lung biopsy done in 2008 which did not show cancer  · Encourage smoking cessation  Would benefit from outpatient PFTs and yearly lung cancer screening  · Pulmonology following, appreciate input     * Schizoaffective disorder, bipolar type [facility])   Assessment & Plan    · Management per psychiatry  · From personal care home, with documentation of schizoaffective and bipolar d/o  · Unhappy at her personal care home  · Brought in for paranoia and delusions  · She believes she has a tracker in her arm  · A1c 5 5, RPR nonreactive, TSH within normal limits, lipid panel   Gastroesophageal reflux disease without esophagitis   Assessment & Plan    Continue PPI     Acquired hypothyroidism   Assessment & Plan    · TSH within normal limits  · Continue current dose of synthroid      Chronic respiratory failure (HCC)   Assessment & Plan    On chronic 3L oxygen  Currently at baseline  Maintain O2 sats between 88-92%         VTE Pharmacologic Prophylaxis:   Pharmacologic: Enoxaparin (Lovenox)  Mechanical VTE Prophylaxis in Place: Yes    Patient Centered Rounds: I have performed bedside rounds with nursing staff today  Discussions with Specialists or Other Care Team Provider:  Nursing, pulm note appreciated    Education and Discussions with Family / Patient:  I have answered all questions to the best of my ability  Time Spent for Care: 20 minutes  More than 50% of total time spent on counseling and coordination of care as described above  Current Length of Stay: 3 day(s)    Current Patient Status: Inpatient Psych   Certification Statement: The patient will continue to require additional inpatient hospital stay due to Schizoaffective disorder    Discharge Plan: To be discharged once cleared by primary service  Code Status: Level 1 - Full Code      Subjective:   Resting comfortably in bed  Denies complaints  Refusing bronchoscopy  Not interested in smoking cessation at this time  Objective:     Vitals:   Temp (24hrs), Av °F (36 7 °C), Min:97 5 °F (36 4 °C), Max:98 6 °F (37 °C)    Temp:  [97 5 °F (36 4 °C)-98 6 °F (37 °C)] 97 9 °F (36 6 °C)  HR:  [71-83] 71  Resp:  [16-18] 18  BP: (109-115)/(58-67) 112/58  SpO2:  [97 %-98 %] 98 %  Body mass index is 25 81 kg/m²       Input and Output Summary (last 24 hours):     No intake or output data in the 24 hours ending 19 1354    Physical Exam:     Physical Exam   Constitutional: She appears well-developed  No distress  HENT:   Head: Normocephalic  Neck: Normal range of motion  Cardiovascular: Normal rate  Pulmonary/Chest: Effort normal  No accessory muscle usage  No tachypnea  No respiratory distress  She has decreased breath sounds in the right middle field, the right lower field and the left lower field  Skin: She is not diaphoretic  Additional Data:     Labs:      Results from last 7 days  Lab Units 01/17/19  1408   WBC Thousand/uL 9 50   HEMOGLOBIN g/dL 13 4   HEMATOCRIT % 41 9   PLATELETS Thousands/uL 204   NEUTROS PCT % 82*   LYMPHS PCT % 12*   MONOS PCT % 5   EOS PCT % 1       Results from last 7 days  Lab Units 01/17/19  1408   POTASSIUM mmol/L 4 4   CHLORIDE mmol/L 105   CO2 mmol/L 28   BUN mg/dL 12   CREATININE mg/dL 0 70   CALCIUM mg/dL 9 4   ALK PHOS U/L 89   ALT U/L 21   AST U/L 17           * I Have Reviewed All Lab Data Listed Above  * Additional Pertinent Lab Tests Reviewed:  All Labs Within Last 24 Hours Reviewed    Imaging:    Imaging Reports Reviewed Today Include:  CT chest  Imaging Personally Reviewed by Myself Includes:  None    Recent Cultures (last 7 days):           Last 24 Hours Medication List:     Current Facility-Administered Medications:  acetaminophen 650 mg Oral Q6H PRN Petra Acron, CRNP   acetaminophen 650 mg Oral Q4H PRN Petra Acron, CRNP   acetaminophen 975 mg Oral Q6H PRN Petra Acron, CRNP   albuterol 2 puff Inhalation TID Inderjit Velez MD   aluminum-magnesium hydroxide-simethicone 30 mL Oral Q4H PRN Petra Acron, CRNP   benztropine 1 mg Intramuscular BID PRN Petra Acron, CRNP   benztropine 1 mg Oral BID PRN Petra Eloyon, CRNP   enoxaparin 40 mg Subcutaneous Q24H [facility] Amanda Bryan MD   EPINEPHrine PF 0 15 mg Intramuscular PRN ABILIO Nguyen   fluticasone-salmeterol 1 puff Inhalation Q12H [address] PA-C   hydrOXYzine HCL 25 mg Oral Q6H PRN Petra Lyonson, CRNP   levalbuterol 1 25 mg Nebulization Q8H PRN Lu Noriega MD   Or       sodium chloride 3 mL Nebulization Q8H PRN Lu Noriega MD   levothyroxine 125 mcg Oral Daily Vicky Ciminieri, ABILIO   lithium carbonate 300 mg Oral Q12H [address] Lu Noriega MD   LORazepam 1 mg Intramuscular Q4H PRN Peter Coral, ABILIO   LORazepam 0 5 mg Oral Q4H PRN Peter Coral, CRNP   magnesium hydroxide 30 mL Oral Daily PRN [place]al, CRNP   nicotine 14 mg Transdermal Daily Halima Ambrosia, CRNP   nicotine polacrilex 2 mg Oral Q2H PRN [place], CRNP   pantoprazole 20 mg Oral Daily Vicky Ciminieri, CRNP   predniSONE 5 mg Oral Daily Vicky Ciminieri, CRNP   QUEtiapine 50 mg Oral HS Lu Noriega MD   theophylline 200 mg Oral Daily Vicky Ciminieri, ABILIO   traZODone 25 mg Oral HS PRN [place] Wilks, ABILIO        Today, Patient Was Seen By: ABILIO Chaparro    ** Please Note: Dictation voice to text software may have been used in the creation of this document   **

## 2019-01-18 NOTE — ASSESSMENT & PLAN NOTE
· Management per psychiatry  · From personal care home, with documentation of schizoaffective and bipolar d/o  · Unhappy at her personal care home  · Brought in for paranoia and delusions  · She believes she has a tracker in her arm  · A1c 5 5, RPR nonreactive, TSH within normal limits, lipid panel

## 2019-01-18 NOTE — CASE MANAGEMENT
Spoke to pt's son, Justin Kaye regarding pt's admission and tx  Justin Kaye reports that pt does not have a POA right now  Justin Kaye is not able to take on that role and pt always finds a reason to interfere with the process when her brother or sister try to make the POA official  Justin Kaye believes that [facility] has to take pt back after d/c and the family has not received any notification from the facility stating that she can't return  He would like pt to move back to [facility] as well but understands that process takes awhile and pt's best option is to return to [facility] and then work on switching her benefits and supports in [facility]  Salvador plans to visit pt on Tues or Wed and will try to reason with pt with regards to her meds and tx compliance   CM will meet with them at that time

## 2019-01-18 NOTE — ASSESSMENT & PLAN NOTE
· Pt with COPD, asthma, and continues to smoke  · Current meds:  advair (substitute with Breo), levalbuterol nebs Q6hrs prn, and theophyline  · Per records, she is also on 5mg of prednisone daily  Pt admits she is on daily prednisone  · Chronic 3L oxygen  · CXR: Shows emphysema and also right 3 cm nodular density  · Patient states that she had a lung biopsy done in 2008 which did not show cancer  · Encourage smoking cessation  Would benefit from outpatient PFTs and yearly lung cancer screening    · Pulmonology following, appreciate input

## 2019-01-18 NOTE — PROGRESS NOTES
Pt observed calm and cooperative  Pt denies s/s  Pt present in the milieu at dinner  Pt continues on 3L O2 therapy  Pt stated being happy to have met her psychiatrist today  Pt compliant with medication

## 2019-01-18 NOTE — CASE MANAGEMENT
Left VM for pt's sister, Miriam Monique [phone] to discuss d/c plan    Left another Vm for pt's brother, Alexis Reed [phone] to ask about POA status and to discuss d/c plan and housing

## 2019-01-18 NOTE — CONSULTS
Consultation - Pulmonary Medicine   Ama Turner 66 y o  female MRN: [id number]  Unit/Bed#: [facility] 548-78 Encounter: [id number]      Physician Requesting Consult: Dr Edson Phillips  Reason for Consult: Abnormal CT chest    Assessment/Plan:    1  Abnormal CT chest; RML atelectasis  · Abnormality was not present on CT chest in 2016  · Differential includes mucous plug vs maligancy vs benign obstruction etc   · She would benefit from bronchoscopy but she is refusing all procedures  Given her psychiatric disorders, I think it is reasonable to repeat her CT chest without contrast in 4-6 weeks to ensure resolution of the abnormality  If it persists, she will require bronchoscopy  2  Tobacco dependence syndrome; emphysema; COPD  · I encouraged tobacco cessation  She is not interested in quitting at this time  · She would benefit from outpatient follow up for PFTs and yearly lung cancer screening  3  Chronic hypoxemic respiratory failure  1  Cont  Oxygen for pulse ox 88-92%  Discussed with patient in detail  All concerns addressed  I gave her my card and instructed her to call for an appointment after discharge  Thank you for this consult  Please call with any questions or concerns  I am signing off  Please recall PRN  ______________________________________________________________________    HPI:    Ama Turner is a 66 y o  female who was admitted with paranoia and delusions  She has known schizoaffective disorder  She was seen in consultation by the hospitalist team  Given her chronic SOB and COPD, a CXR was checked  It revealed a right hilar density  As a result, a CT chest was checked and the patient was found to have RML collapse  We are consulted for this reason  Found sitting out of bed at a table, eating breakfast  She reports breathlessness with exertion  She is wearing 3 LPM NC oxygen  She has an oxygen concentrator at home and wears 3LPM NC oxygen  She has an occasional cough with clear sputum   She is a lifelong smoker  She reports that she removes her oxygen to smoke  She denies any family history of lung disease  Review of Systems:  Full 12 point review of systems was performed  Aside from what was mentioned in the HPI, it is otherwise negative  Historical Information   Past Medical History:   Diagnosis Date    Anxiety     COPD (chronic obstructive pulmonary disease) ([address] )     Depression     GERD (gastroesophageal reflux disease)     Schizoaffective disorder ([facility])      No past surgical history on file  Social History   History   Alcohol Use No     History   Smoking Status    Current Every Day Smoker    Packs/day: 0 25   Smokeless Tobacco    Never Used     No known family history of lung disease    Family History:   History reviewed  No pertinent family history  Medications: The patient's active and prehospital medications were reviewed      Current Facility-Administered Medications:  acetaminophen 650 mg Oral Q6H PRN Makenzie Carolina, CRNP   acetaminophen 650 mg Oral Q4H PRN Makenzie Carolina, CRNP   acetaminophen 975 mg Oral Q6H PRN Makenzie Martines, CRNP   albuterol 2 puff Inhalation TID Janusz Ferrell MD   aluminum-magnesium hydroxide-simethicone 30 mL Oral Q4H PRN Makenzie Martines, CRNP   ARIPiprazole 2 mg Oral Daily Janusz Ferrell MD   benztropine 1 mg Intramuscular BID PRN Makenzie Martines, CRNP   benztropine 1 mg Oral BID PRN Makenzie Martines, CRNP   enoxaparin 40 mg Subcutaneous Q24H [address] Beryl Rodriguez MD   EPINEPHrine PF 0 15 mg Intramuscular PRN Vicky Fields, JOSE CARLOSNP   fluticasone-salmeterol 1 puff Inhalation Q12H [facility]   hydrOXYzine HCL 25 mg Oral Q6H PRN Makenzie Martines, JOSE CARLOSNP   levalbuterol 1 25 mg Nebulization Q8H PRN Janusz Ferrell MD   Or       sodium chloride 3 mL Nebulization Q8H PRN Janusz Ferrell MD   levothyroxine 125 mcg Oral Daily Vicky Donnie, ABILIO   lithium carbonate 300 mg Oral Q12H [facility] MD Glenn   LORazepam 1 mg Intramuscular Q4H PRN Oval Smoke, CRNP   LORazepam 0 5 mg Oral Q4H PRN Oval Smoke, CRNP   magnesium hydroxide 30 mL Oral Daily PRN Oval Smoke, CRNP   nicotine 14 mg Transdermal Daily Vicky Ciminieri, CRNP   nicotine polacrilex 2 mg Oral Q2H PRN Oval Smoke, CRNP   pantoprazole 20 mg Oral Daily Vicky Ciminieri, CRNP   predniSONE 5 mg Oral Daily Vicky Ciminieri, CRNP   theophylline 200 mg Oral Daily Elaine Shell, CRNP   traZODone 25 mg Oral HS PRN Oval Smoke, CRNP         PhysicalExamination:  Vitals:   Vitals:    01/17/19 1700 01/17/19 2129 01/18/19 0722 01/18/19 1028   BP:  115/63 112/58    BP Location:  Left arm Left arm    Pulse: 80 71 71    Resp: 16 18 18    Temp:  97 5 °F (36 4 °C) 97 9 °F (36 6 °C)    TempSrc:  Tympanic Tympanic    SpO2: 98% 97% 97% 98%   Weight:       Height:         Body mass index is 25 81 kg/m²  Temp  Min: 97 5 °F (36 4 °C)  Max: 98 6 °F (37 °C)  IBW: 52 4 kg    SpO2: 98 %,   SpO2 Activity: At Rest,   O2 Device: Nasal cannula    Gen: WDWN  Poor dentition  Speaking in full sentences, no distress  HEENT: NCAT, EOMI  Neck: Supple  Chest: CTA B/L  Cardiac: Regular  Abd: soft, obese  Ext: No c/c/e  Neuro: Nonfocal  Skin: warm, dry  Psych: calm, cooperative  Diagnostic Data:  CBC:     Results from last 7 days  Lab Units 01/17/19  1408   WBC Thousand/uL 9 50   HEMOGLOBIN g/dL 13 4   HEMATOCRIT % 41 9   PLATELETS Thousands/uL 204       CMP:     Results from last 7 days  Lab Units 01/17/19  1408   SODIUM mmol/L 139   POTASSIUM mmol/L 4 4   CHLORIDE mmol/L 105   CO2 mmol/L 28   BUN mg/dL 12   CREATININE mg/dL 0 70   CALCIUM mg/dL 9 4   ALK PHOS U/L 89   ALT U/L 21   AST U/L 17     Microbiology: None  ABG: No results found for: PHART, CRM6RYU, PO2ART, UJU2FKB, Z6IGGLDA, BEART, SOURCE    Imaging: CT chest without contrast reviewed by me personally shows RML collapse with panlobular emphysema  Large hiatal hernia          Tiny Sea, DO

## 2019-01-18 NOTE — ASSESSMENT & PLAN NOTE
· Counseled on cessation, in addition to the dangers of smoking with oxygen tank  · Nicotine patch ordered  · Patient not interested in smoking cessation

## 2019-01-18 NOTE — PROGRESS NOTES
Pt observed calm and cooperative today  Pt OOB and seen in the milieu at meals  Pt denies s/s  Pt reported SOB  Pt continues on 3L O2 therapy  Pt medication compliant

## 2019-01-18 NOTE — CASE MANAGEMENT
Spoke with [facility], [facility] [phone] briefly regarding pt's return  He stated that pt cannot return because they cannot meet her needs  CM asked what the d/c plan is for her as she cannot stay in the hospital and he could not give an answer  He stated that he will call CM back on Monday when their licensing supervisor is available so they can give more information to support their reason

## 2019-01-18 NOTE — PROGRESS NOTES
Patient requesting neb treatment  Respiratory pagegordo  Respiratory called back and said he is in an emergency and will come as soon as he can  Informed patient  Offered scheduled inhalers  She took advair but refused albuterol due to wanting neb treatment from respiratory

## 2019-01-18 NOTE — CASE MANAGEMENT
Spoke with pt's sister, Jose Manuel Marroquin [phone] regarding pt's tx and d/c plan  CM stated that pt is refusing to sign a VERNA for [facility] so CM has not been able to follow up with them to see if pt is allowed to return  CM gave update on last covered day through insurance and reviewed the process for getting pt's benefits switched into [facility] [place]  Jose Manuel Marroquin states that pt needs more support and care than a [facility] can provide her with  She states that pt's psychosis and paranoia inhibits her tx and progress  CM knows that [facility] has been trying to work with pt for awhile to provide case management and make sure she's consistent with her meds, appts, etc but pt refuses help  Jose Manuel Marroquin continues to think that pt is going to be able to stay in the hospital for a long time in order to establish residency in [place] and CM reminded her that this is not feasible

## 2019-01-18 NOTE — PLAN OF CARE
Problem: Ineffective Coping  Goal: Participates in unit activities  Interventions:  - Provide therapeutic environment   - Provide required programming   - Redirect inappropriate behaviors    Outcome: Progressing  Pt with brighter affect but continued to refuse engagement in structured groups  She was receptive to a visit 1-1 from the [facility] staff

## 2019-01-19 PROCEDURE — 94760 N-INVAS EAR/PLS OXIMETRY 1: CPT

## 2019-01-19 PROCEDURE — 99232 SBSQ HOSP IP/OBS MODERATE 35: CPT | Performed by: PSYCHIATRY & NEUROLOGY

## 2019-01-19 PROCEDURE — 94640 AIRWAY INHALATION TREATMENT: CPT

## 2019-01-19 RX ADMIN — THEOPHYLLINE ANHYDROUS 200 MG: 200 CAPSULE, EXTENDED RELEASE ORAL at 09:09

## 2019-01-19 RX ADMIN — LITHIUM CARBONATE EXTENDED-RELEASE TABLET 300 MG: 300 TABLET, FILM COATED, EXTENDED RELEASE ORAL at 21:10

## 2019-01-19 RX ADMIN — LEVALBUTEROL HYDROCHLORIDE 1.25 MG: 1.25 SOLUTION, CONCENTRATE RESPIRATORY (INHALATION) at 08:21

## 2019-01-19 RX ADMIN — PANTOPRAZOLE SODIUM 20 MG: 20 TABLET, DELAYED RELEASE ORAL at 06:38

## 2019-01-19 RX ADMIN — LITHIUM CARBONATE EXTENDED-RELEASE TABLET 300 MG: 300 TABLET, FILM COATED, EXTENDED RELEASE ORAL at 09:09

## 2019-01-19 RX ADMIN — ISODIUM CHLORIDE 3 ML: 0.03 SOLUTION RESPIRATORY (INHALATION) at 23:03

## 2019-01-19 RX ADMIN — PREDNISONE 5 MG: 5 TABLET ORAL at 09:09

## 2019-01-19 RX ADMIN — NICOTINE 14 MG: 14 PATCH, EXTENDED RELEASE TRANSDERMAL at 09:32

## 2019-01-19 RX ADMIN — LEVOTHYROXINE SODIUM 125 MCG: 125 TABLET ORAL at 06:36

## 2019-01-19 RX ADMIN — QUETIAPINE FUMARATE 50 MG: 50 TABLET ORAL at 21:10

## 2019-01-19 NOTE — PROGRESS NOTES
Patient has been preoccupied with receiving a breathing treatment  Patient is not SOB nor does she have wheezing  Patient insisted on receiving a breathing treatment prophylactically  Respiratory therapist assessed patient and noted that the patient did not have indications for a breathing treatment  Respiratory attempted to educate patient on breathing treatment indications, this was partially effective

## 2019-01-19 NOTE — PROGRESS NOTES
Progress Note - Behavioral Health   Madison Miner 66 y o  female MRN: [id number]  Unit/Bed#: Barbara Prasad 304-11 Encounter: [id number]    The patient was seen for continuing care and reviewed with treatment team Preoccupied with future living arrangement,insisting on not returning to previous setting based on her paranoid ideas  Adequate sleep and appetite  Mental Status Evaluation:  Appearance:  Good eye contact and disheveled   Behavior:  cooperative   Mood:  anxious and dysphoric   Thought Content:  Paranoid and mistrustful and Delusions of persecution   Perceptual Disturbances: Denies hallucinations and does not appear to be responding to internal stimuli   Risk Potential: No suicidal or homicidal ideation   Orientation:            Assessment/Plan    Principal Problem:    Schizoaffective disorder, bipolar type ([facility] )  Active Problems:    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[facility] )    Tobacco use disorder, continuous    Chronic respiratory failure ([facility] )    Acquired hypothyroidism    Gastroesophageal reflux disease without esophagitis    Abnormal CT of the chest      Recommended Treatment: increase quetiapine and recheck EKGContinue with pharmacotherapy, group therapy, milieu therapy and occupational therapy    The patient will be maintained on the following medications:    Current Facility-Administered Medications:  acetaminophen 650 mg Oral Q6H PRN Peter Coral, CRNP   acetaminophen 650 mg Oral Q4H PRN Brandon Coral, CRNP   acetaminophen 975 mg Oral Q6H PRN Peter Coral, CRNP   albuterol 2 puff Inhalation TID Lu Noriega MD   aluminum-magnesium hydroxide-simethicone 30 mL Oral Q4H PRN Peter Coral, CRNP   benztropine 1 mg Intramuscular BID PRN Brandon Coral, CRNP   benztropine 1 mg Oral BID PRN Peter Coral, CRNP   enoxaparin 40 mg Subcutaneous Q24H [address] Remo Nyhan, MD   EPINEPHrine PF 0 15 mg Intramuscular PRN Vicky Ciminieri, CRNP   fluticasone-salmeterol 1 puff Inhalation Q12H [address] PA-C hydrOXYzine HCL 25 mg Oral Q6H PRN Aristides Drone, CRNP   levalbuterol 1 25 mg Nebulization Q8H PRN Ernesto Horn MD   Or       sodium chloride 3 mL Nebulization Q8H PRN Ernesto Horn MD   levothyroxine 125 mcg Oral Daily Vicky Ciminieri, CRNP   lithium carbonate 300 mg Oral Q12H [facility] Ernesto Horn MD   LORazepam 1 mg Intramuscular Q4H PRN Aristides Drone, CRNP   LORazepam 0 5 mg Oral Q4H PRN Aristides Drone, CRNP   magnesium hydroxide 30 mL Oral Daily PRN Aristides Drone, CRNP   nicotine 14 mg Transdermal Daily Vicky Ciminieri, CRNP   nicotine polacrilex 2 mg Oral Q2H PRN Aristides Drone, CRNP   pantoprazole 20 mg Oral Daily Vicky Ciminieri, CRNP   predniSONE 5 mg Oral Daily Vicky Ciminieri, CRNP   QUEtiapine 50 mg Oral HS Ernesto Horn MD   theophylline 200 mg Oral Daily Vicky Ciminieri, CRNP   traZODone 25 mg Oral HS PRN Aristides Drone, CRNP

## 2019-01-19 NOTE — PLAN OF CARE
Alteration in Thoughts and Perception     Treatment Goal: Gain control of psychotic behaviors/thinking, reduce/eliminate presenting symptoms and demonstrate improved reality functioning upon discharge Progressing        Anxiety     Anxiety is at manageable level Progressing        Depression     Treatment Goal: Demonstrate behavioral control of depressive symptoms, verbalize feelings of improved mood/affect, and adopt new coping skills prior to discharge Progressing     Verbalize thoughts and feelings Progressing     Refrain from harming self Progressing     Refrain from isolation Progressing     Refrain from self-neglect Progressing     Attend and participate in unit activities, including therapeutic, recreational, and educational groups Progressing     Complete daily ADLs, including personal hygiene independently, as able [address] Discharge to home or other facility with appropriate resources Progressing        Ineffective Coping     Participates in unit activities Progressing

## 2019-01-19 NOTE — PROGRESS NOTES
Pt calm, pleasant and brightens on approach; remains medication compliant and denies SI/HI at this time  Pt reports anxiety r/t where she is going to live when discharged  Pt c/o ear ache and fullness, but denies pain  Will notify slim  Will continue to monitor

## 2019-01-20 PROCEDURE — 94760 N-INVAS EAR/PLS OXIMETRY 1: CPT

## 2019-01-20 PROCEDURE — 94640 AIRWAY INHALATION TREATMENT: CPT

## 2019-01-20 PROCEDURE — 99232 SBSQ HOSP IP/OBS MODERATE 35: CPT | Performed by: PSYCHIATRY & NEUROLOGY

## 2019-01-20 RX ORDER — QUETIAPINE FUMARATE 100 MG/1
100 TABLET, FILM COATED ORAL
Status: DISCONTINUED | OUTPATIENT
Start: 2019-01-20 | End: 2019-01-22

## 2019-01-20 RX ADMIN — LITHIUM CARBONATE EXTENDED-RELEASE TABLET 300 MG: 300 TABLET, FILM COATED, EXTENDED RELEASE ORAL at 22:05

## 2019-01-20 RX ADMIN — ALBUTEROL SULFATE 2 PUFF: 90 AEROSOL, METERED RESPIRATORY (INHALATION) at 22:06

## 2019-01-20 RX ADMIN — QUETIAPINE FUMARATE 100 MG: 100 TABLET ORAL at 22:06

## 2019-01-20 RX ADMIN — NICOTINE 14 MG: 14 PATCH, EXTENDED RELEASE TRANSDERMAL at 08:50

## 2019-01-20 RX ADMIN — LEVALBUTEROL HYDROCHLORIDE 1.25 MG: 1.25 SOLUTION, CONCENTRATE RESPIRATORY (INHALATION) at 13:25

## 2019-01-20 RX ADMIN — PREDNISONE 5 MG: 5 TABLET ORAL at 08:59

## 2019-01-20 RX ADMIN — LITHIUM CARBONATE EXTENDED-RELEASE TABLET 300 MG: 300 TABLET, FILM COATED, EXTENDED RELEASE ORAL at 08:49

## 2019-01-20 RX ADMIN — THEOPHYLLINE ANHYDROUS 200 MG: 200 CAPSULE, EXTENDED RELEASE ORAL at 08:49

## 2019-01-20 RX ADMIN — PANTOPRAZOLE SODIUM 20 MG: 20 TABLET, DELAYED RELEASE ORAL at 07:03

## 2019-01-20 RX ADMIN — LEVOTHYROXINE SODIUM 125 MCG: 125 TABLET ORAL at 07:03

## 2019-01-20 NOTE — PLAN OF CARE
Problem: Alteration in Thoughts and Perception  Goal: Treatment Goal: Gain control of psychotic behaviors/thinking, reduce/eliminate presenting symptoms and demonstrate improved reality functioning upon discharge  Outcome: Progressing      Problem: Depression  Goal: Treatment Goal: Demonstrate behavioral control of depressive symptoms, verbalize feelings of improved mood/affect, and adopt new coping skills prior to discharge  Outcome: Progressing    Goal: Verbalize thoughts and feelings  Interventions:  - Assess and re-assess patient's level of risk   - Engage patient in 1:1 interactions, daily, for a minimum of 15 minutes   - Encourage patient to express feelings, fears, frustrations, hopes    Outcome: Progressing    Goal: Refrain from harming self  Interventions:  - Monitor patient closely, per order   - Supervise medication ingestion, monitor effects and side effects    Outcome: Progressing    Goal: Refrain from isolation  Interventions:  - Develop a trusting relationship   - Encourage socialization    Outcome: Progressing    Goal: Refrain from self-neglect  Outcome: Progressing    Goal: Attend and participate in unit activities, including therapeutic, recreational, and educational groups  Interventions:  - Provide therapeutic and educational activities daily, encourage attendance and participation, and document same in the medical record    Outcome: Progressing    Goal: Complete daily ADLs, including personal hygiene independently, as able  Interventions:  - Observe, teach, and assist patient with ADLS  -  Monitor and promote a balance of rest/activity, with adequate nutrition and elimination    Outcome: Progressing      Problem: Anxiety  Goal: Anxiety is at manageable level  Interventions:  - Assess and monitor patient's anxiety level     - Monitor for signs and symptoms of anxiety both physical and emotional (heart palpitations, chest pain, shortness of breath, headaches, nausea, feeling jumpy, restlessness, irritable, apprehensive)  - Collaborate with interdisciplinary team and initiate plan and interventions as ordered    - [place] patient to unit/surroundings  - Explain treatment plan  - Encourage participation in care  - Encourage verbalization of concerns/fears  - Identify coping mechanisms  - Assist in developing anxiety-reducing skills  - Administer/offer alternative therapies  - Limit or eliminate stimulants   Outcome: Progressing      Problem: DISCHARGE PLANNING  Goal: Discharge to home or other facility with appropriate resources  INTERVENTIONS:  - Identify barriers to discharge w/patient and caregiver  - Arrange for needed discharge resources and transportation as appropriate  - Identify discharge learning needs (meds, wound care, etc )  - Arrange for interpretive services to assist at discharge as needed  - Refer to Case Management Department for coordinating discharge planning if the patient needs post-hospital services based on physician/advanced practitioner order or complex needs related to functional status, cognitive ability, or social support system   Outcome: Progressing      Problem: Ineffective Coping  Goal: Participates in unit activities  Interventions:  - Provide therapeutic environment   - Provide required programming   - Redirect inappropriate behaviors    Outcome: Progressing

## 2019-01-20 NOTE — NURSING NOTE
Respiratory therapist called to inform patient asked for a breathing treatment  Davidson Jorge did not appear to be in any respiratory distress, lungs clear to auscultation, scheduled advair inhaler given  Patient still insisted on receiving neb tx  Respiratory tech was unable to provide neb tx to patient stated she was the only therapist on, and she will come later in the day when she has time  Patient again walked over to nurses station connected to [place] [id number] and asked about the nb tx  Respiratory tech was again paged and asked to provide patient with a treatment per patients request  Informed by Resp tech she will come over after a patient in the ICU

## 2019-01-20 NOTE — PLAN OF CARE
Alteration in Thoughts and Perception     Treatment Goal: Gain control of psychotic behaviors/thinking, reduce/eliminate presenting symptoms and demonstrate improved reality functioning upon discharge Progressing        Anxiety     Anxiety is at manageable level Progressing        Depression     Treatment Goal: Demonstrate behavioral control of depressive symptoms, verbalize feelings of improved mood/affect, and adopt new coping skills prior to discharge Progressing     Verbalize thoughts and feelings Progressing     Refrain from harming self Progressing     Refrain from isolation Progressing     Refrain from self-neglect Progressing     Attend and participate in unit activities, including therapeutic, recreational, and educational groups Progressing     Complete daily ADLs, including personal hygiene independently, as able Progressing        Ineffective Coping     Participates in unit activities Progressing

## 2019-01-20 NOTE — PROGRESS NOTES
Pt calm, cooperative, preoccupied with breathing treatments  States she cannot sleep without receiving one at night  Respiratory was at bedside to complete treatment  Pt now in bed sleeping

## 2019-01-20 NOTE — PROGRESS NOTES
Progress Note - Behavioral Health   Annamarie Eng 66 y o  female MRN: [id number]  Unit/Bed#: Ashkan Aguirre 049-28 Encounter: [id number]    The patient was seen for continuing care and reviewed with treatment team   At times demanding and attention seeking  Despite oxygen level of 97% and no respiratory distress and no wheezing, she insists that she needs a nebulizer treatment  However, her thought process seems to have improved  Mental Status Evaluation:  Appearance:  Good eye contact and disheveled   Behavior:  calm and cooperative   Mood:  anxious   Thought Content:  Does not verbalize delusional material   Perceptual Disturbances: Denies hallucinations and does not appear to be responding to internal stimuli   Risk Potential: No suicidal or homicidal ideation   Orientation:            Assessment/Plan    Principal Problem:    Schizoaffective disorder, bipolar type ([facility] )  Active Problems:    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[facility] )    Tobacco use disorder, continuous    Chronic respiratory failure ([facility] )    Acquired hypothyroidism    Gastroesophageal reflux disease without esophagitis    Abnormal CT of the chest      Recommended Treatment: We will gradually increase quetiapine and I will check a lithium level  Continue with pharmacotherapy, group therapy, milieu therapy and occupational therapy    The patient will be maintained on the following medications:    Current Facility-Administered Medications:  acetaminophen 650 mg Oral Q6H PRN JOSE CARLOS RodriguezNP   acetaminophen 650 mg Oral Q4H PRN ABILIO Rodriguez   acetaminophen 975 mg Oral Q6H PRN ABILIO Rodriguez   albuterol 2 puff Inhalation TID Fallon Duke MD   aluminum-magnesium hydroxide-simethicone 30 mL Oral Q4H PRN ABILIO Rodriguez   benztropine 1 mg Intramuscular BID PRN ABILIO Rodriguez   benztropine 1 mg Oral BID PRN ABILIO Rodriguez   enoxaparin 40 mg Subcutaneous Q24H [facility] Christ Kc MD   EPINEPHrine PF 0 15 mg Intramuscular PRN Louie Leonardo Ciminifaisal, CRNP   fluticasone-salmeterol 1 puff Inhalation Q12H [address] [facility], PA-C   hydrOXYzine HCL 25 mg Oral Q6H PRN Tai Estuardo, CRNP   levalbuterol 1 25 mg Nebulization Q8H PRN Meena Schuler MD   Or       sodium chloride 3 mL Nebulization Q8H PRN Meena Schuler MD   levothyroxine 125 mcg Oral Daily Vicky Donnie, CRNP   lithium carbonate 300 mg Oral Q12H [facility] Meena Schuler MD   LORazepam 1 mg Intramuscular Q4H PRN Tai Estuardo, CRNP   LORazepam 0 5 mg Oral Q4H PRN Tai Estuardo, CRNP   magnesium hydroxide 30 mL Oral Daily PRN Tai Estuardo, CRNP   nicotine 14 mg Transdermal Daily Danton Judith, CRNP   nicotine polacrilex 2 mg Oral Q2H PRN Tia Colton, CRNP   pantoprazole 20 mg Oral Daily Vicky Donnie, CRNP   predniSONE 5 mg Oral Daily Vicky Donnie, CRNP   QUEtiapine 50 mg Oral HS Meena Schuler MD   theophylline 200 mg Oral Daily Vicky Donnie, CRNP   traZODone 25 mg Oral HS PRN Tai Estuardo, CRNP

## 2019-01-20 NOTE — RESPIRATORY THERAPY NOTE
Pt got assessed has no wheezing or shortness of breath  PT B/L breath sounds are clear  PT is Sat at 97% O2  Pt is able to have a normal conversation with out having any respiratory distress  PT will be given xopenex prn for her afternoon tx, instead of ventolin inhaler since the pt is insisting she wants nebulizer

## 2019-01-20 NOTE — PROGRESS NOTES
Pt compliant with treatment and medication  Pt present in the milieu  Pt appears anxious at times, denies SI and HI  Will continue to monitor

## 2019-01-21 PROCEDURE — 99232 SBSQ HOSP IP/OBS MODERATE 35: CPT | Performed by: NURSE PRACTITIONER

## 2019-01-21 RX ORDER — FLUTICASONE FUROATE AND VILANTEROL 200; 25 UG/1; UG/1
1 POWDER RESPIRATORY (INHALATION) DAILY
Status: DISCONTINUED | OUTPATIENT
Start: 2019-01-22 | End: 2019-03-20 | Stop reason: HOSPADM

## 2019-01-21 RX ADMIN — LITHIUM CARBONATE EXTENDED-RELEASE TABLET 300 MG: 300 TABLET, FILM COATED, EXTENDED RELEASE ORAL at 09:19

## 2019-01-21 RX ADMIN — PANTOPRAZOLE SODIUM 20 MG: 20 TABLET, DELAYED RELEASE ORAL at 06:37

## 2019-01-21 RX ADMIN — ALBUTEROL SULFATE 2 PUFF: 90 AEROSOL, METERED RESPIRATORY (INHALATION) at 19:25

## 2019-01-21 RX ADMIN — ALBUTEROL SULFATE 2 PUFF: 90 AEROSOL, METERED RESPIRATORY (INHALATION) at 13:34

## 2019-01-21 RX ADMIN — LEVOTHYROXINE SODIUM 125 MCG: 125 TABLET ORAL at 06:37

## 2019-01-21 RX ADMIN — ALBUTEROL SULFATE 2 PUFF: 90 AEROSOL, METERED RESPIRATORY (INHALATION) at 08:49

## 2019-01-21 RX ADMIN — PREDNISONE 5 MG: 5 TABLET ORAL at 09:19

## 2019-01-21 RX ADMIN — QUETIAPINE FUMARATE 100 MG: 100 TABLET ORAL at 22:15

## 2019-01-21 RX ADMIN — LITHIUM CARBONATE EXTENDED-RELEASE TABLET 300 MG: 300 TABLET, FILM COATED, EXTENDED RELEASE ORAL at 22:14

## 2019-01-21 RX ADMIN — THEOPHYLLINE ANHYDROUS 200 MG: 200 CAPSULE, EXTENDED RELEASE ORAL at 09:19

## 2019-01-21 RX ADMIN — NICOTINE 14 MG: 14 PATCH, EXTENDED RELEASE TRANSDERMAL at 09:26

## 2019-01-21 NOTE — CASE MANAGEMENT
Spoke with pt about housing options after d/c  CM reminded pt that the hospital is a short-term treatment facility and we cannot have pt stay as long as it'll take for her to become established in [facility]  Pt is hopeful that her brother or sister will offer temporary housing for her [place] and [place]  CM provided pt with a list of other personal care/assisted living facilities that she can look at and choose referral sites  Pt does not want to go to a nursing home or be involved with Aging  She doesn't feel comfortable with renting a room or going to a hotel  She stated that she's been to the shelter around here before and thought it was okay but they won't take her back because of her oxygen tank  CM will call pt's sister and brother to ask if they can provide temporary housing for pt   CM will follow up with pt to see if any other [facility]'s can be contacted to send referral

## 2019-01-21 NOTE — PLAN OF CARE
Problem: Ineffective Coping  Goal: Participates in unit activities  Interventions:  - Provide therapeutic environment   - Provide required programming   - Redirect inappropriate behaviors    Outcome: Not Progressing  Pt  refused all three groups today,stating multiple excuses of being" too busy",having to wait for medical staff and not being here for therapy

## 2019-01-21 NOTE — PROGRESS NOTES
Progress Note - Behavioral Health   Annamarie Eng 66 y o  female MRN: [id number]  Unit/Bed#: Ashkan Aguirre 712-42 Encounter: [id number]    The patient was seen for continuing care and reviewed with treatment team   Staff reports that the patient has been calm cooperative and social   She is anxious at times  She is unable to return to [facility] so placement is pending  Patient is sitting in the day room eating her breakfast   She says that she is unable to be interviewed because she needs to concentrate on eating  Later on she did cooperate with the interview  She stated that she is loaded with anxiety as well as depressed due to her living situation and not knowing where she is going to go after discharge  She says she is sleeping and eating well  Feels better since she has been back on Seroquel  No SI   Grandiose, making statements that she has the IQ of a genius  Was somewhat paranoid at times  Her only complaint is that her ears are full and she is having periods of feeling off balance  Will ask Internal Medicine to evaluate  Mental Status Evaluation:  Appearance:  Good eye contact and disheveled   Behavior:  Dismissive   Mood:  anxious and depressed   Affect: appropriate   Speech: Normal rate and Normal volume   Thought Process:   Tangential   Thought Content:  Paranoid and mistrustful and Grandiose delusions   Perceptual Disturbances: Uncertain   Risk Potential: No suicidal or homicidal ideation   Attention/Concentration [place] than expected   Orientation:   Oriented x3   Gait/Station:  needs assistive device   Motor Activity: No abnormal movement noted     Progress Toward Goals:     Assessment/Plan    Principal Problem:    Schizoaffective disorder, bipolar type ([facility])  Active Problems:    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[facility])    Tobacco use disorder, continuous    Chronic respiratory failure ([facility])    Acquired hypothyroidism    Gastroesophageal reflux disease without esophagitis    Abnormal CT of the chest      Recommended Treatment:      Continue lithium 300 mg q 12 hours  Refused her labs this morning so will reorder lithium level for tomorrow  Continue Seroquel 100 mg HS  Dose was increased last night  Continue with pharmacotherapy, group therapy, milieu therapy and occupational therapy    The patient will be maintained on the following medications:    Current Facility-Administered Medications:  acetaminophen 650 mg Oral Q6H PRN Ariana Whiteside, CRNP   acetaminophen 650 mg Oral Q4H PRN Ariana Whiteside, CRNP   acetaminophen 975 mg Oral Q6H PRN Ariana Whiteside, CRNP   albuterol 2 puff Inhalation TID Richie Roger MD   aluminum-magnesium hydroxide-simethicone 30 mL Oral Q4H PRN Ariana Whiteside, CRNP   benztropine 1 mg Intramuscular BID PRN Ariana Whiteside, CRNP   benztropine 1 mg Oral BID PRN Ariana Whiteside, CRNP   enoxaparin 40 mg Subcutaneous Q24H [facility] Jerica Vcitoria MD   EPINEPHrine PF 0 15 mg Intramuscular PRN Jovita Fields, CRNP   fluticasone-salmeterol 1 puff Inhalation Q12H [facility] Mirtha Godinez PA-C   hydrOXYzine HCL 25 mg Oral Q6H PRN Ariana Whiteside, CRNP   levalbuterol 1 25 mg Nebulization Q8H PRN Richie Roger MD   Or       sodium chloride 3 mL Nebulization Q8H PRN Richie Roger MD   levothyroxine 125 mcg Oral Daily Vickyshailesh Fields, CRNP   lithium carbonate 300 mg Oral Q12H [facility] Richie Roger MD   LORazepam 1 mg Intramuscular Q4H PRN Ariana Whiteside, CRNP   LORazepam 0 5 mg Oral Q4H PRN Ariana Whiteside, CRNP   magnesium hydroxide 30 mL Oral Daily PRN Ariana Whiteside, CRNP   nicotine 14 mg Transdermal Daily Jagruti Dapper, CRNP   nicotine polacrilex 2 mg Oral Q2H PRN Ariana Whiteside, CRNP   pantoprazole 20 mg Oral Daily Vicky Ciminifaisal, CRNP   predniSONE 5 mg Oral Daily Vicky Ciminieri, CRNP   QUEtiapine 100 mg Oral HS Richie Roger MD   theophylline 200 mg Oral Daily ABILIO Nguyen   traZODone 25 mg Oral HS PRN ABILIO Sandoval       Risks, benefits and possible side effects of Medications:   Patient does not verbalize understanding at this time and will require further explanation

## 2019-01-21 NOTE — CASE MANAGEMENT
Spoke with pt to see if she's heard from her sister or brother today as CM hasn't been able to get a hold of either of them yet  Pt stated she hasn't spoken with either yet today but will try calling this evening as that's when they usually speak on the phone  CM asked if she looked over list of PCHs and if there are any that CM can send referral to  Pt stated that she's interested in [facility], [address]  [facility], [facility], and is open to other facilities as long as they're in [facility]   CM sent referrals out to the following:    [facility] at [facility] [place] [facility] [phone]  1/21/2019 3:31 PM (ET)  1/21/2019 5:30 PM (ET)  -  -  -  Waiting for recipient     Response [facility] [phone]  1/21/2019 3:31 PM (ET)  1/21/2019 5:30 PM (ET)  -  -  -  Waiting for recipient     Response Notes    [place] at [facility]  [phone]  1/21/2019 3:31 PM (ET)  1/21/2019 5:30 PM (ET)  -  -  -  Waiting for recipient     Response Notes    [address]  [facility] [phone]  1/21/2019 3:31 PM (ET)  1/21/2019 5:30 PM (ET)  -  -  -  Waiting for recipient     Response Notes    [facility]  [phone]  1/21/2019 3:31 PM (ET)  1/21/2019 5:30 PM (ET)  -  -  -  Waiting for recipient      [facility]  [phone]  1/21/2019 3:31 PM (ET)  1/21/2019 5:30 PM (ET)  -  -  -  Waiting for recipient     Response Notes    Rajiv Schmitz [phone]  1/21/2019 3:31 PM (ET)  1/21/2019 5:30 PM (ET)  -  -  -  Waiting for recipient      [address] [place] [address] [phone]  1/21/2019 3:31 PM (ET)  1/21/2019 5:30 PM (ET)  -  -  -  Waiting for recipient      [place] at [address] [place] [address],[address] [phone]  1/21/2019 3:31 PM (ET)  1/21/2019 5:30 PM (ET)  -  -  -  Waiting for recipient     Response Notes    Darlene Arroyo  [phone] 1/21/2019 3:31 PM (ET)  1/21/2019 5:30 PM (ET)  -  -  -  Waiting for recipient     Response Notes

## 2019-01-21 NOTE — PROGRESS NOTES
Pt brightens upon approach, is medication compliant and cooperative with care  Pt reports anxiety, but denies all other s/s including SI and HI  Pt is preoccupied with scheduled inhalers and becomes demanding if not done at exactly the scheduled time  Pt is visible on the unit and social with select peers  Pt comes out for meals and select activities  Will continue to monitor

## 2019-01-21 NOTE — CASE MANAGEMENT
Left VM for [facility] [phone] to finish discussion regarding pt not being able to return  Left another VM for pt's sister, [employer], in order to figure out d/c plan and housing

## 2019-01-22 LAB — LITHIUM SERPL-SCNC: 0.5 MMOL/L (ref 0.6–1.2)

## 2019-01-22 PROCEDURE — 80178 ASSAY OF LITHIUM: CPT | Performed by: NURSE PRACTITIONER

## 2019-01-22 PROCEDURE — 99232 SBSQ HOSP IP/OBS MODERATE 35: CPT | Performed by: NURSE PRACTITIONER

## 2019-01-22 RX ADMIN — THEOPHYLLINE ANHYDROUS 200 MG: 200 CAPSULE, EXTENDED RELEASE ORAL at 08:55

## 2019-01-22 RX ADMIN — LITHIUM CARBONATE EXTENDED-RELEASE TABLET 300 MG: 300 TABLET, FILM COATED, EXTENDED RELEASE ORAL at 08:55

## 2019-01-22 RX ADMIN — NICOTINE 14 MG: 14 PATCH, EXTENDED RELEASE TRANSDERMAL at 08:55

## 2019-01-22 RX ADMIN — ALBUTEROL SULFATE 2 PUFF: 90 AEROSOL, METERED RESPIRATORY (INHALATION) at 12:37

## 2019-01-22 RX ADMIN — LEVOTHYROXINE SODIUM 125 MCG: 125 TABLET ORAL at 06:31

## 2019-01-22 RX ADMIN — ALBUTEROL SULFATE 2 PUFF: 90 AEROSOL, METERED RESPIRATORY (INHALATION) at 07:10

## 2019-01-22 RX ADMIN — PANTOPRAZOLE SODIUM 20 MG: 20 TABLET, DELAYED RELEASE ORAL at 06:31

## 2019-01-22 RX ADMIN — QUETIAPINE FUMARATE 150 MG: 50 TABLET ORAL at 21:56

## 2019-01-22 RX ADMIN — FLUTICASONE FUROATE AND VILANTEROL TRIFENATATE 1 PUFF: 200; 25 POWDER RESPIRATORY (INHALATION) at 08:51

## 2019-01-22 RX ADMIN — PREDNISONE 5 MG: 5 TABLET ORAL at 08:54

## 2019-01-22 NOTE — CASE MANAGEMENT
Spoke with Ame Like from [facility] at [facility] [address]  about referral  She stated that they don't really have an outdoor smoking area that pt would be able to use but she's going to see what she can do and will call CM back with definite answer

## 2019-01-22 NOTE — PROGRESS NOTES
Pt cooperative with care and medication compliant except she declined her Lovenox injection  Pt reports anxiety and depression but denies all other symptoms  Pt stated "I am not ready to go home  Everyone wants to get me out of here  I am not ready " Pt preoccupied and fixated on her medications especially her inhalers  Pt is visible in milieu and social among staff and peers  Will continue to monitor

## 2019-01-22 NOTE — PROGRESS NOTES
Progress Note - Behavioral Health   Roselia Woody 66 y o  female MRN: [id number]  Unit/Bed#: Dinora Patel 757-29 Encounter: [id number]    The patient was seen for continuing care and reviewed with treatment team   Staff reports that the patient remains depressed and anxious  She is irritable at times and preoccupied with medications  She refused all 3 groups yesterday with multiple excuses of being too busy  She cannot return to the [facility] so we are looking for placement  She has not been cooperative with a m  lab draws so we have still not obtained a lithium level  The patient remains somatic, demanding, and entitled  Makes grandiose statements at times and can be demeaning towards staff  Upon interview the patient was brighter and states her mood is good today however she is still complaining of increased anxiety, decreased energy, decreased concentration, and anhedonia  MMSE showed deficits in attention, concentration, insight and judgement  She is sleeping well with the Seroquel and has an appetite  Denies suicidal ideation or intent however later in interview stated she would not care if she dies or not  She remains extremely tangential and has difficulty concentrating and remaining on topic  Difficult to redirect  Remains delusional, talking about the tracker in her arm, her involvement with a famous porn  who was kidnapped and murdered in [place] and how she was his favorite and he nicknamed her "sweet baby sofiya"  No insight into her delusions  Mental Status Evaluation:  Appearance:  Good eye contact and disheveled   Behavior:  cooperative   Mood:  euthymic   Affect: mood-congruent   Speech: Normal rate and Normal volume   Thought Process:   Tangential   Thought Content:  Paranoid and mistrustful, Delusions of persecution, Grandiose delusions and Somatic delusions   Perceptual Disturbances: Denies hallucinations and does not appear to be responding to internal stimuli   Risk Potential: No suicidal or homicidal ideation   Attention/Concentration Age appropriate   Orientation:   Oriented x3   Gait/Station:  needs assistive device   Motor Activity: No abnormal movement noted     Progress Toward Goals:  Remains delusional    Assessment/Plan    Principal Problem:    Schizoaffective disorder, bipolar type ([facility])  Active Problems:    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[facility])    Tobacco use disorder, continuous    Chronic respiratory failure ([facility])    Acquired hypothyroidism    Gastroesophageal reflux disease without esophagitis    Abnormal CT of the chest      Recommended Treatment:     Continue lithium 300 mg q 12 hours  As the patient keeps refusing a m  labs I have placed an order for a lithium level to be drawn tonight prior to p m  Lithium administration  Increase Seroquel to 150 mg HS  Continue with pharmacotherapy, group therapy, milieu therapy and occupational therapy    The patient will be maintained on the following medications:    Current Facility-Administered Medications:  acetaminophen 650 mg Oral Q6H PRN Selvin Sanchez, CRNP   acetaminophen 650 mg Oral Q4H PRN Selvin Sanchez, CRNP   acetaminophen 975 mg Oral Q6H PRN Selvin Sanchez, CRNP   albuterol 2 puff Inhalation TID Marilin Noriega MD   aluminum-magnesium hydroxide-simethicone 30 mL Oral Q4H PRN Selvin Sanchez, JOSE CARLOSNP   benztropine 1 mg Intramuscular BID PRN Selvin Sanchez, JOSE CARLOSNP   benztropine 1 mg Oral BID PRN Selvin Sanchez, JOSE CARLOSNP   enoxaparin 40 mg Subcutaneous Q24H [address] Castro Grant MD   EPINEPHrine PF 0 15 mg Intramuscular PRN Vicky Fields, JOSE CARLOSNP   fluticasone-vilanterol 1 puff Inhalation Daily Ibis Catalan MD   hydrOXYzine HCL 25 mg Oral Q6H PRN Selvin Sanchez, JOSE CARLOSNP   levalbuterol 1 25 mg Nebulization Q8H PRN Marilin Noriega MD   Or       sodium chloride 3 mL Nebulization Q8H PRN Marilin Noriega MD   levothyroxine 125 mcg Oral Daily Vicky ABILIO Fields   lithium carbonate 300 mg Oral Q12H January Rowland MD LORazepam 1 mg Intramuscular Q4H PRN Ernesto Rings, CRNP   LORazepam 0 5 mg Oral Q4H PRN Ernesto Rings, CRNP   magnesium hydroxide 30 mL Oral Daily PRN Ernesto Rings, CRNP   nicotine 14 mg Transdermal Daily Vicky Ciminieri, CRNP   nicotine polacrilex 2 mg Oral Q2H PRN Ernesto Rings, CRNP   pantoprazole 20 mg Oral Daily Vicky Ciminieri, CRNP   predniSONE 5 mg Oral Daily Vicky Ciminieri, CRNP   QUEtiapine 100 mg Oral HS Ashley Tripathi MD   theophylline 200 mg Oral Daily Vicky Ciminieri, CRNP   traZODone 25 mg Oral HS PRN Ernesto Rings, CRNP       Risks, benefits and possible side effects of Medications:   Patient does not verbalize understanding at this time and will require further explanation

## 2019-01-22 NOTE — CASE MANAGEMENT
The following [facility] referrals have been declined:     [address]  [place] at [facility]

## 2019-01-22 NOTE — PLAN OF CARE
Problem: Ineffective Coping  Goal: Participates in unit activities  Interventions:  - Provide therapeutic environment   - Provide required programming   - Redirect inappropriate behaviors    Outcome: Not Progressing  Pt continues to refuse to participate in unit groups  She occasionally sits in the back of the room to observe  Pt states that she will only discuss her feelings and wellness with Dr Anu rosario

## 2019-01-22 NOTE — PROGRESS NOTES
Pt cooperative with care and compliant with medications  Irritable at times and preoccupied with medications  Denies all sx including SI  Monitoring

## 2019-01-22 NOTE — PROGRESS NOTES
Clinical Pharmacy Note: Antipsychotic Monitoring Requirements     Nina Bello is a 66 y o  female who presents with delusions of persecution and is currently taking  quetiapine 150 mg once daily  Performing metabolic monitoring on patients receiving any antipsychotics is a [facility] and [employer] (CMS) requirement  Antipsychotic treatment increases the risk of developing type 2 diabetes mellitus, hypertension, and hyperlipidemia  According to the [place] [address] (NICE) guidelines, baseline weight, waist circumference, pulse, blood pressure, fasting blood glucose, hemoglobin A1c, lipid profile, and prolactin level (if initiating risperidone or paliperidone) should be collected  These guidelines coincide with Centers and Medicare & Medicaid Services (CMS) requirements including baseline Body Mass Index, blood pressure, fasting glucose, hemoglobin A1c, and fasting lipid panel  CMS states laboratory values collected 12 months from discharge date are acceptable for evaluation  CMS Checklist:     BMI: yes  BP: yes  Fasting glucose: yes  A1c within last 12 months: yes  Lipid panel within last 12 months: yes  Nicotine Replacement Therapy: yes    The following values have been collected:  Value Status Result    BMI Body mass index is 25 81 kg/m²     Blood pressure /67 (BP Location: Right arm)   Pulse 78   Temp 97 6 °F (36 4 °C) (Tympanic)   Resp 16   Ht 5' 3" (1 6 m)   Wt 66 1 kg (145 lb 11 6 oz)   SpO2 95%   BMI 25 81 kg/m²    Fasting glucose   0  Lab Value Date/Time   GLUC 124 (H) 01/17/2019 1408      Hemoglobin A1c   0  Lab Value Date/Time   HGBA1C 5 5 01/17/2019 1647   HGBA1C 5 9 (H) 04/27/2015 0530      Lipid panel   0  Lab Value Date/Time   CHOLESTEROL 212 (H) 01/17/2019 1408       0  Lab Value Date/Time   HDL 56 01/17/2019 1408   HDL 74 01/23/2015 0928       0  Lab Value Date/Time   LDLCALC 134 (H) 01/17/2019 1408   LDLCALC 121 (H) 01/23/2015 0928        0  Lab Value Date/Time   TRIG 112 01/17/2019 1408   TRIG 123 01/23/2015 0928        ASCVD risk score: 6 4%  History of ACS, MI, stable angina, stroke, TIA, PAD, coronary/aterial revascularization:  no  LDL cholesterol =>190 mg/dL: no  Age: 66 y o  Diabetes: no  Sex: female  Race: other  HDL Cholesterol: 56  Systolic Blood Pressure: 385  Treatment for Hypertension: no  Smoker: yes  (score reflects the risk of cardiovascular events such as stroke or myocardial infarction in the next 10 years)    Recommendations    Based on the results of hemoglobin A1c, lipid panel, and blood pressure monitoring, [facility] is an potential candidate for no pharmacological interventions and routine monitoring based on  HbA1c<6 5%, ASCVD<7 5% and BP within goal       Based on the above information, pharmacy recommends the following: Continue yearly metabolic monitoring         Pharmacy will continue to follow patient with team   Electronically signed by: Jose De Jesus, Pharmacist

## 2019-01-22 NOTE — CASE MANAGEMENT
Spoke with pt's sister, Bee Sampson [phone] about pt's d/c plan  Bee Sampson continues to argue that pt is not ready for d/c and there are no placement options for her  She is not able to provide any temporary housing and insists that pt needs to be in the hospital as she will not continue taking her meds once she's out on her own  CM gave update on [facility] referrals that were sent as well as the option for CR in [place]  CM reminded Bee Sampson that this is a short-term tx facility and not a residential facility  CM also stated that once pt's insurance coverage runs out, pt will start getting medical bills for inpatient tx

## 2019-01-23 LAB — LITHIUM SERPL-SCNC: 0.5 MMOL/L (ref 0.6–1.2)

## 2019-01-23 PROCEDURE — 99232 SBSQ HOSP IP/OBS MODERATE 35: CPT | Performed by: NURSE PRACTITIONER

## 2019-01-23 PROCEDURE — 80178 ASSAY OF LITHIUM: CPT | Performed by: NURSE PRACTITIONER

## 2019-01-23 RX ORDER — QUETIAPINE FUMARATE 100 MG/1
200 TABLET, FILM COATED ORAL
Status: DISCONTINUED | OUTPATIENT
Start: 2019-01-23 | End: 2019-01-24

## 2019-01-23 RX ORDER — LITHIUM CARBONATE 300 MG/1
300 TABLET, FILM COATED, EXTENDED RELEASE ORAL DAILY
Status: DISCONTINUED | OUTPATIENT
Start: 2019-01-24 | End: 2019-03-20 | Stop reason: HOSPADM

## 2019-01-23 RX ORDER — LITHIUM CARBONATE 450 MG
450 TABLET, EXTENDED RELEASE ORAL
Status: DISCONTINUED | OUTPATIENT
Start: 2019-01-23 | End: 2019-03-20 | Stop reason: HOSPADM

## 2019-01-23 RX ADMIN — ALBUTEROL SULFATE 2 PUFF: 90 AEROSOL, METERED RESPIRATORY (INHALATION) at 08:43

## 2019-01-23 RX ADMIN — NICOTINE 14 MG: 14 PATCH, EXTENDED RELEASE TRANSDERMAL at 08:43

## 2019-01-23 RX ADMIN — LEVOTHYROXINE SODIUM 125 MCG: 125 TABLET ORAL at 06:08

## 2019-01-23 RX ADMIN — QUETIAPINE FUMARATE 200 MG: 100 TABLET ORAL at 22:21

## 2019-01-23 RX ADMIN — LITHIUM CARBONATE 450 MG: 450 TABLET, EXTENDED RELEASE ORAL at 22:21

## 2019-01-23 RX ADMIN — THEOPHYLLINE ANHYDROUS 200 MG: 200 CAPSULE, EXTENDED RELEASE ORAL at 08:43

## 2019-01-23 RX ADMIN — FLUTICASONE FUROATE AND VILANTEROL TRIFENATATE 1 PUFF: 200; 25 POWDER RESPIRATORY (INHALATION) at 08:45

## 2019-01-23 RX ADMIN — ALBUTEROL SULFATE 2 PUFF: 90 AEROSOL, METERED RESPIRATORY (INHALATION) at 13:21

## 2019-01-23 RX ADMIN — PANTOPRAZOLE SODIUM 20 MG: 20 TABLET, DELAYED RELEASE ORAL at 06:08

## 2019-01-23 RX ADMIN — ALBUTEROL SULFATE 2 PUFF: 90 AEROSOL, METERED RESPIRATORY (INHALATION) at 20:00

## 2019-01-23 RX ADMIN — LITHIUM CARBONATE EXTENDED-RELEASE TABLET 300 MG: 300 TABLET, FILM COATED, EXTENDED RELEASE ORAL at 08:43

## 2019-01-23 RX ADMIN — PREDNISONE 5 MG: 5 TABLET ORAL at 08:47

## 2019-01-23 NOTE — PROGRESS NOTES
Progress Note - Behavioral Health   Violetta Villaseonr 66 y o  female MRN: [id number]  Unit/Bed#: Joe Manzano 651-00 Encounter: [id number]    The patient was seen for continuing care and reviewed with treatment team   Staff reports that the patient remains needy and demanding and has been engaging and staff splitting behaviors  We are looking for placement but the patient is not agreeable to current options  The patient remains tangential but is more easily redirectable in conversation  She denies depression but says she is very anxious regarding her living and financial situation  She says she is feeling angry and frustrated and feels like giving up  Denies SI  Slept well last night with her Seroquel  She is still complaining of the balance symptoms and ear symptoms  We are waiting for Internal Medicine to evaluate her for that  She agreed to go to group today  She did not spontaneously voice delusional material     Spoke with Marii Warren, the patient's sister who has concerns about her sister  She says the  patient is still extremely delusional and not showering or taking care of herself and she does not think she is appropriate to leave anytime soon  She believes she needs a group home setting or longer term institutionalization  Mental Status Evaluation:  Appearance:  Good eye contact and disheveled   Behavior:  cooperative   Mood:  anxious   Affect: mood-congruent   Speech: Rapid   Thought Process:   Tangential   Thought Content:  Perseverative   Perceptual Disturbances: Denies hallucinations and does not appear to be responding to internal stimuli   Risk Potential: No suicidal or homicidal ideation   Attention/Concentration Decreased   Orientation:   Oriented x3   Gait/Station:  needs assistive device   Motor Activity: No abnormal movement noted     Progress Toward Goals:  Remains anxious    Assessment/Plan    Principal Problem:    Schizoaffective disorder, bipolar type (HCC)  Active Problems:    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    Tobacco use disorder, continuous    Chronic respiratory failure ([facility] )    Acquired hypothyroidism    Gastroesophageal reflux disease without esophagitis    Abnormal CT of the chest      Recommended Treatment:      Increase Seroquel 200 mg HS  Continue lithium 300 mg q a m  and increase HS dose from 300 to 450 mg  Current level today was 0 5  Continue with pharmacotherapy, group therapy, milieu therapy and occupational therapy    The patient will be maintained on the following medications:    Current Facility-Administered Medications:  acetaminophen 650 mg Oral Q6H PRN Jannine Hand, CRNP   acetaminophen 650 mg Oral Q4H PRN Jannine Hand, CRNP   acetaminophen 975 mg Oral Q6H PRN Jannine Hand, CRNP   albuterol 2 puff Inhalation TID Vini Putnam MD   aluminum-magnesium hydroxide-simethicone 30 mL Oral Q4H PRN Jannine Hand, CRNP   benztropine 1 mg Intramuscular BID PRN Jannine Hand, CRNP   benztropine 1 mg Oral BID PRN Jannine Hand, CRNP   enoxaparin 40 mg Subcutaneous Q24H [facility] Jessica Pitts MD   EPINEPHrine PF 0 15 mg Intramuscular PRN Vicky Ciminieri, CRNP   fluticasone-vilanterol 1 puff Inhalation Daily Jesus Parson MD   hydrOXYzine HCL 25 mg Oral Q6H PRN Jannine Hand, CRNP   levalbuterol 1 25 mg Nebulization Q8H PRN Vini Putnam MD   Or       sodium chloride 3 mL Nebulization Q8H PRN Vini Putnam MD   levothyroxine 125 mcg Oral Daily Vicky Ciminieri, CRNP   lithium carbonate 300 mg Oral Q12H [facility] Vini Putnam MD   LORazepam 1 mg Intramuscular Q4H PRN Jannine Hand, CRNP   LORazepam 0 5 mg Oral Q4H PRN Jannine Hand, CRNP   magnesium hydroxide 30 mL Oral Daily PRN Jannine Hand, CRNP   nicotine 14 mg Transdermal Daily Vicky Ciminieri, CRNP   nicotine polacrilex 2 mg Oral Q2H PRN Jannine Hand, CRNP   pantoprazole 20 mg Oral Daily Vicky Ciminieri, CRNP   predniSONE 5 mg Oral Daily Vicky Ciminieri, CRNP   QUEtiapine 150 mg Oral HS ABILIO Jarquin theophylline 200 mg Oral Daily ABILIO Nguyen   traZODone 25 mg Oral HS PRN ABILIO Lopez       Risks, benefits and possible side effects of Medications:   Patient does not verbalize understanding at this time and will require further explanation

## 2019-01-23 NOTE — CASE MANAGEMENT
Left VM for pt's sister, Daja Quintana [phone] to request a family meeting to discuss housing and placement    Left VM for pt's son, Diandra [place] [phone] to give update on pt's need for placement and discuss a game plan for housing

## 2019-01-23 NOTE — CASE MANAGEMENT
CM was able to get a hold of Mitzi Avila [facility] [phone] to discuss pt's upcoming d/c  CM stated that we do not have any eviction notice/documentation stating that pt cannot return and that the hospital is not a d/c destination or residential facility  Therefore, pt will be d/c'ed back to their care  Chester Bansal became argumentative and stated that they do not need to produce any documentation since pt has been out of their facility/care for over 72 hours  Before CM would get any more words out, Chester Bansal stated that he was going to contact the state licensing board and disconnected the call

## 2019-01-23 NOTE — PLAN OF CARE
Problem: Ineffective Coping  Goal: Participates in unit activities  Interventions:  - Provide therapeutic environment   - Provide required programming   - Redirect inappropriate behaviors    Outcome: Progressing  Pt fully engaged in all three groups today with less resistance  Her social behaviors in the structured sessions was more appropriate in nature

## 2019-01-23 NOTE — CASE MANAGEMENT
Spoke with pt to give update on [facility] referrals and crisis res option  Pt became very defensive and argumentative, stating that she wasn't ready to leave the hospital and wants to stay  She stated that she is not stable yet and doesn't understand why everyone is pushing her out  CM explained that she is not being discharged today and she may require inpatient treatment for a few more day but the team needs to plan for her d/c and develop a safe d/c plan ahead of time  CM reminded her that she does not have [address] or benefits at this point and that process takes awhile  Pt is against the idea of going to a crisis res, she doesn't think it would be good for her, and she's against returning to [facility]  She stated that "returning to that area would be her demise " Pt refused to sign VERNA for CR referral  She continues to argue that her insurance company is going to pay for her to stay here longer and refuses to come up with other options for where she can go after d/c

## 2019-01-23 NOTE — PROGRESS NOTES
Patient spent evening sitting in day room or talking on phone  Refused evening inhaler saying she had it already and is not going to use it again because she does not want to "OD" on her medications  Explained and discussed inhalers at length with no change in outcome  Patient also had numerous medical complaints saying she has been asking for days to have her ears checked, states she cannot stand for long periods at a time and asked for a chair to be placed at nurses desk and discussed at length her treatment for her breathing problems  Interacts appropriately with staff and peers

## 2019-01-23 NOTE — PROGRESS NOTES
Pt irritable, demanding and testing limits  Refused lovenox and c/o bilat ear pain, D  [address] notified  Good appetite  VSS  Pt also refused "any" laxative for constipation x 3 days  Pox 99% with O2 via nc at 3 liters/min  Constricted affect  Monitored for safety and support

## 2019-01-23 NOTE — CASE MANAGEMENT
Writer spoke with pt's sister, [facility], who expressed concern over patient being discharged  Per sister, pt is chronically ill and is unable to stabilize in 8 days and requires a longer stay in the hospital  Writer and sister reviewed that patient is not suicidal, homicidal, and not having hallucinations or self-harming behaviors  Pt's sister also states that she wants the patient to live in [place] despite the fact that patient has not been accepted to a [facility] in [place]  Writer explained that multiple referrals have been placed and pt has not been accepted and encouraged family to continue to search for placement after discharge, however, explained that patient does not have to remain inpatient while they search for an available bed  [facility] was thankful for writer's time and agreed to call back if additional questions arise

## 2019-01-24 PROCEDURE — 99232 SBSQ HOSP IP/OBS MODERATE 35: CPT | Performed by: NURSE PRACTITIONER

## 2019-01-24 RX ORDER — QUETIAPINE FUMARATE 300 MG/1
300 TABLET, FILM COATED ORAL
Status: DISCONTINUED | OUTPATIENT
Start: 2019-01-24 | End: 2019-01-28

## 2019-01-24 RX ADMIN — ALBUTEROL SULFATE 2 PUFF: 90 AEROSOL, METERED RESPIRATORY (INHALATION) at 08:55

## 2019-01-24 RX ADMIN — QUETIAPINE FUMARATE 300 MG: 300 TABLET ORAL at 21:55

## 2019-01-24 RX ADMIN — NICOTINE 14 MG: 14 PATCH, EXTENDED RELEASE TRANSDERMAL at 08:55

## 2019-01-24 RX ADMIN — FLUTICASONE FUROATE AND VILANTEROL TRIFENATATE 1 PUFF: 200; 25 POWDER RESPIRATORY (INHALATION) at 08:52

## 2019-01-24 RX ADMIN — PANTOPRAZOLE SODIUM 20 MG: 20 TABLET, DELAYED RELEASE ORAL at 06:05

## 2019-01-24 RX ADMIN — LEVOTHYROXINE SODIUM 125 MCG: 125 TABLET ORAL at 06:05

## 2019-01-24 RX ADMIN — THEOPHYLLINE ANHYDROUS 200 MG: 200 CAPSULE, EXTENDED RELEASE ORAL at 08:54

## 2019-01-24 RX ADMIN — LITHIUM CARBONATE 450 MG: 450 TABLET, EXTENDED RELEASE ORAL at 21:55

## 2019-01-24 RX ADMIN — ALBUTEROL SULFATE 2 PUFF: 90 AEROSOL, METERED RESPIRATORY (INHALATION) at 20:12

## 2019-01-24 RX ADMIN — LITHIUM CARBONATE EXTENDED-RELEASE TABLET 300 MG: 300 TABLET, FILM COATED, EXTENDED RELEASE ORAL at 08:54

## 2019-01-24 RX ADMIN — PREDNISONE 5 MG: 5 TABLET ORAL at 08:54

## 2019-01-24 RX ADMIN — ALBUTEROL SULFATE 2 PUFF: 90 AEROSOL, METERED RESPIRATORY (INHALATION) at 13:13

## 2019-01-24 NOTE — PROGRESS NOTES
Progress Note - Behavioral Health   Madison Miner 66 y o  female MRN: [id number]  Unit/Bed#: Barbara Prasad 521-53 Encounter: [id number]    The patient was seen for continuing care and reviewed with treatment team   Staff reports that the patient has been medication compliant  She has been visible and social with select peers  She attended all 3 groups yesterday and was able to engage more appropriately in the structured sessions  She is still not showering  The patient states she is depressed and very anxious  No SI  Denies hallucinations  She is eating and sleeping well  Remains extremely tangential and delusional   She was speaking about how she has a permanent tracker in her arm and also that there was a  at [facility] that is continuously stalking her  She believes some staff members are prejudiced towards her because of knowing her from her past   She said she does not want to take a shower because it is dangerous and she does not want to put herself or staff at risk but she said she would be willing to have a bed bath  Mental Status Evaluation:  Appearance:  Good eye contact and disheveled   Behavior:  cooperative   Mood:  anxious and depressed   Affect: mood-congruent   Speech: Normal rate and Normal volume   Thought Process:   Tangential   Thought Content:  Paranoid and mistrustful and Delusions of persecution   Perceptual Disturbances: Denies hallucinations and does not appear to be responding to internal stimuli   Risk Potential: No suicidal or homicidal ideation   Attention/Concentration [place] than expected   Orientation:   Oriented x3   Gait/Station:  needs assistive device   Motor Activity: No abnormal movement noted     Progress Toward Goals:  More spontaneous with delusions today    Assessment/Plan    Principal Problem:    Schizoaffective disorder, bipolar type ([facility] )  Active Problems:    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[facility] )    Tobacco use disorder, continuous    Chronic respiratory failure ([facility] )    Acquired hypothyroidism    Gastroesophageal reflux disease without esophagitis    Abnormal CT of the chest      Recommended Treatment:      Increase Seroquel to 300 mg HS  Continue lithium 300 mg q a m  and 450 mg q h s  Will check a level in a few days  Continue with pharmacotherapy, group therapy, milieu therapy and occupational therapy    The patient will be maintained on the following medications:    Current Facility-Administered Medications:  acetaminophen 650 mg Oral Q6H PRN [facility], CRNP   acetaminophen 650 mg Oral Q4H PRN [facility], CRNP   acetaminophen 975 mg Oral Q6H PRN [facility], CRNP   albuterol 2 puff Inhalation TID Trish Gonsalves MD   aluminum-magnesium hydroxide-simethicone 30 mL Oral Q4H PRN [facility], CRNP   benztropine 1 mg Intramuscular BID PRN [facility], CRNP   benztropine 1 mg Oral BID PRN [facility], CRNP   enoxaparin 40 mg Subcutaneous Q24H [address] Sabina Lerner MD   EPINEPHrine PF 0 15 mg Intramuscular PRN [facility], [facility]   fluticasone-vilanterol 1 puff Inhalation Daily Gee Coleman MD   hydrOXYzine HCL 25 mg Oral Q6H PRN [facility], CRNP   levalbuterol 1 25 mg Nebulization Q8H PRN Trish Gonsalves MD   Or       sodium chloride 3 mL Nebulization Q8H PRN Trish Gonsalves MD   levothyroxine 125 mcg Oral Daily Vicky Ciminieri, CRNP   lithium carbonate 300 mg Oral Daily Margurette Siemens, CRNP   lithium carbonate 450 mg Oral HS Margurette Siemens, CRNP   LORazepam 1 mg Intramuscular Q4H PRN [facility], CRNP   LORazepam 0 5 mg Oral Q4H PRN [facility], CRNP   magnesium hydroxide 30 mL Oral Daily PRN [facility], CRNP   nicotine 14 mg Transdermal Daily Mat Seton, CRNP   nicotine polacrilex 2 mg Oral Q2H PRN [facility], CRNP   pantoprazole 20 mg Oral Daily Vicky Ciminieri, CRNP   predniSONE 5 mg Oral Daily Vicky Ciminieri, CRNP   QUEtiapine 200 mg Oral HS Margurette Siemens, CRNP   theophylline 200 mg Oral Daily ABILIO Sharma   traZODone 25 mg Oral HS PRN ABILIO Farris       Risks, benefits and possible side effects of Medications:   Patient does not verbalize understanding at this time and will require further explanation

## 2019-01-24 NOTE — PLAN OF CARE
Problem: Ineffective Coping  Goal: Participates in unit activities  Interventions:  - Provide therapeutic environment   - Provide required programming   - Redirect inappropriate behaviors    Outcome: Progressing  Pt continues to attend groups more readily and displays interest in more appropriate focus to group tasks

## 2019-01-24 NOTE — CASE MANAGEMENT
Left [facility] for pt's sister, Jarad [phone] to see if she was coming to visit pt today and if we could meet to discuss pt's tx and options for d/c

## 2019-01-24 NOTE — PROGRESS NOTES
Awake, alert, oriented  Anxious  Pleasant and cooperative on approach  Refusing Lovenox at this time, otherwise medication compliant  Pt with no complaints at this time  Will continue to monitor

## 2019-01-24 NOTE — CASE MANAGEMENT
CL contacted the following [facility] that accept SSI to inquire about available female beds:     Joi Caglesin [phone] -- left  for Sam  -Morning Alexey Arroyo [phone] -- left  for Lucian Sesay [phone] -- Admissions Director will call CL back  [facility] [place], [place] [phone] -- Admissions Director will call CL back

## 2019-01-24 NOTE — PROGRESS NOTES
Pt cooperative with care and compliant with medications  Visible in mileu and social with select peers  Less somatic tonight but continues to be preoccupied with medications  Denies SI  Attended evening group on periphery with no involvement  Monitoring

## 2019-01-24 NOTE — PROGRESS NOTES
Pt declined MOM for constipation for no BM x3 days  Stated this is not unusual for her and she believes she will go soon

## 2019-01-25 PROCEDURE — 99231 SBSQ HOSP IP/OBS SF/LOW 25: CPT | Performed by: NURSE PRACTITIONER

## 2019-01-25 PROCEDURE — 99232 SBSQ HOSP IP/OBS MODERATE 35: CPT | Performed by: NURSE PRACTITIONER

## 2019-01-25 RX ADMIN — FLUTICASONE FUROATE AND VILANTEROL TRIFENATATE 1 PUFF: 200; 25 POWDER RESPIRATORY (INHALATION) at 08:42

## 2019-01-25 RX ADMIN — ALBUTEROL SULFATE 2 PUFF: 90 AEROSOL, METERED RESPIRATORY (INHALATION) at 08:41

## 2019-01-25 RX ADMIN — LITHIUM CARBONATE 450 MG: 450 TABLET, EXTENDED RELEASE ORAL at 21:53

## 2019-01-25 RX ADMIN — LEVOTHYROXINE SODIUM 125 MCG: 125 TABLET ORAL at 06:17

## 2019-01-25 RX ADMIN — QUETIAPINE FUMARATE 300 MG: 300 TABLET ORAL at 21:52

## 2019-01-25 RX ADMIN — PANTOPRAZOLE SODIUM 20 MG: 20 TABLET, DELAYED RELEASE ORAL at 06:17

## 2019-01-25 RX ADMIN — NICOTINE 14 MG: 14 PATCH, EXTENDED RELEASE TRANSDERMAL at 08:40

## 2019-01-25 RX ADMIN — THEOPHYLLINE ANHYDROUS 200 MG: 200 CAPSULE, EXTENDED RELEASE ORAL at 08:40

## 2019-01-25 RX ADMIN — Medication 5 DROP: at 21:53

## 2019-01-25 RX ADMIN — ALBUTEROL SULFATE 2 PUFF: 90 AEROSOL, METERED RESPIRATORY (INHALATION) at 21:10

## 2019-01-25 RX ADMIN — LITHIUM CARBONATE EXTENDED-RELEASE TABLET 300 MG: 300 TABLET, FILM COATED, EXTENDED RELEASE ORAL at 08:40

## 2019-01-25 RX ADMIN — ALBUTEROL SULFATE 2 PUFF: 90 AEROSOL, METERED RESPIRATORY (INHALATION) at 13:28

## 2019-01-25 RX ADMIN — PREDNISONE 5 MG: 5 TABLET ORAL at 08:40

## 2019-01-25 NOTE — PROGRESS NOTES
Progress Note - Behavioral Health   Eric Vining 66 y o  female MRN: [id number]  Unit/Bed#: Rodriguez Arriaga 995-89 Encounter: [id number]    The patient was seen for continuing care and reviewed with treatment team   Staff reports that the patient has been social and visible  She has been medication compliant  She continues with entitled and grandiose behavior but has been more appropriate in structured groups  We are currently looking for placement  The patient tells me her mood is feeling somewhat better  She says she feels safe here "for the most part" but I am in no way ready to leave here as it would be to my demise  She remains somatic with multiple complaints including vision that has been blurry for months as well as fullness in her ears and dizziness  She states she has been off balance and staggering in the [address]  She remains delusional and paranoid  She states that a tracker was placed in her arm in 1997 during gallbladder surgery and that people have suggested she chop her arm off to get rid of it but she feels the tracker is "a blessing" but would not elaborate  Very paranoid regarding staff at her previous group home  She says they forced her to take a green and yellow capsule for months and she was refusing it but they kept pushing it because they were taking them for their own use  She said they kept her from the phone there and that they used to play tapes of her murdered fiancee talking to her friend which implicated the group home administrator  Continues to refuse to shower here stating that it would not be safe for her or staff but she would be agreeable to a bed bath  She is eating and sleeping well  No suicidal thoughts  Mental Status Evaluation:  Appearance:  Good eye contact and disheveled   Behavior:  cooperative and friendly   Mood:  improving   Affect: appropriate   Speech: Normal rate and Normal volume   Thought Process:   Tangential   Thought Content:  Paranoid and mistrustful, Delusions of persecution and is somatically preoccupied   Perceptual Disturbances: Denies hallucinations and does not appear to be responding to internal stimuli   Risk Potential: No suicidal or homicidal ideation   Attention/Concentration [place] than expected   Orientation:   Oriented x3   Gait/Station: normal balance and  needs assistive device   Motor Activity: No abnormal movement noted     Progress Toward Goals:  No change    Assessment/Plan    Principal Problem:    Schizoaffective disorder, bipolar type ([facility])  Active Problems:    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[facility] )    Tobacco use disorder, continuous    Chronic respiratory failure ([facility])    Acquired hypothyroidism    Gastroesophageal reflux disease without esophagitis    Abnormal CT of the chest      Recommended Treatment:     Continue lithium 300 mg q a m  and 450 mg Q HS  We will check a level early next week as dose was increased Wednesday night  Continue Seroquel 300 mg HS  We increased it last night  Continue with pharmacotherapy, group therapy, milieu therapy and occupational therapy    The patient will be maintained on the following medications:    Current Facility-Administered Medications:  acetaminophen 650 mg Oral Q6H PRN Jose L Caalons, CRNP   acetaminophen 650 mg Oral Q4H PRN Jose L Leahy, CRNP   acetaminophen 975 mg Oral Q6H PRN Jose L Leahy, CRNP   albuterol 2 puff Inhalation TID Mary Lou Snell MD   aluminum-magnesium hydroxide-simethicone 30 mL Oral Q4H PRN Jose L Leahy, CRNP   benztropine 1 mg Intramuscular BID PRN Jose L Leahy, CRNP   benztropine 1 mg Oral BID PRN Jose L Leahy, CRNP   enoxaparin 40 mg Subcutaneous Q24H [address] Matthew Melendez MD   EPINEPHrine PF 0 15 mg Intramuscular PRN Vicky Ciminieri, CRNP   fluticasone-vilanterol 1 puff Inhalation Daily Narinder Aldrich MD   hydrOXYzine HCL 25 mg Oral Q6H PRN Jose L Leahy, JOSE CARLOSNP   levalbuterol 1 25 mg Nebulization Q8H PRN Mary Lou Snell MD   Or       sodium chloride 3 mL Nebulization Q8H PRN Inderjit Velez MD   levothyroxine 125 mcg Oral Daily Vicky Cimryan, CRNP   lithium carbonate 300 mg Oral Daily Shannan Vaca, CRNP   lithium carbonate 450 mg Oral HS Shannan Vaca, CRNP   LORazepam 1 mg Intramuscular Q4H PRN Petra Acron, CRNP   LORazepam 0 5 mg Oral Q4H PRN Petra Acron, CRNP   magnesium hydroxide 30 mL Oral Daily PRN Petra Acron, CRNP   nicotine 14 mg Transdermal Daily Jenifer Burns, CRNP   nicotine polacrilex 2 mg Oral Q2H PRN Petra Lyonson, CRNP   pantoprazole 20 mg Oral Daily Vicky Donnie, CRNP   predniSONE 5 mg Oral Daily Vicky Cimryan, CRNP   QUEtiapine 300 mg Oral HS Shannan Vaca, CRNP   theophylline 200 mg Oral Daily Jenifer Burns, CRNP   traZODone 25 mg Oral HS PRN Petra Eloyon, CRNP       Risks, benefits and possible side effects of Medications:   Risks, benefits, and possible side effects of medications explained to patient and patient verbalizes understanding

## 2019-01-25 NOTE — CASE MANAGEMENT
Spoke to [facility] [phone] regarding referral  She stated that they have a few beds available and are accepting referrals but she has a lot of referrals right now and they try to accommodate individuals based on need  She will review info and let CM know if they can accept pt  Referral info faxed to [phone]        Spoke to Luli Serra, Admissions Director at [facility] [place] and [place]) [phone] regarding referral  She stated that due to zoning regulations and policies established prior to her hire, the facility in [place] won't accept residents with a dx of  Schizoaffective or bipolar  She states that she doesn't agree with this but that's been their policy for a long time  She stated that she will speak with the Director of that facility, Edin Beckford, to see if an exception can be made since pt does not have hx of aggressive/assaultive behaviors and is in dire need of housing/care  She stated that there are no available beds in the [facility] or [place] facilities right now but she might have an opening at the [facility] in about 3 weeks  She can't promise anything though  CM will fax referral info to her and she will review and see if they can help

## 2019-01-25 NOTE — PLAN OF CARE
Problem: Ineffective Coping  Goal: Participates in unit activities  Interventions:  - Provide therapeutic environment   - Provide required programming   - Redirect inappropriate behaviors    Outcome: Progressing  Pt engaged in morning groups with appropriate demeanor less contradictory of discussions

## 2019-01-25 NOTE — PROGRESS NOTES
Clinical Pharmacy Note: [facility]rapeutic Monitoring     Eric Woodard is a 66 y o  female who presents with delusions of persecution    Assessment/Plan:    Lithium indication: mood disorder  Corey Marroquin was taking 300 mg lithium carbonate tablet twice daily  Lithium concentration is 0 5 mmol/L drawn at steady state  Dose has been increased to 300 mg AM and 450 mg PM  Recommend to take steady state trough 12 hours after the evening dose after 5 days of therapy due on 1/30 with AM labs  The pharmacist has checked for drug interactions, lithium levels, kidney function, thyroid function  YES    Pharmacy Recommendations: Please order steady state level for 1/30/19 or when clinically appropriate  NOTE* theophylline increases lithium clearance and therefore decreases serum concentration by about 30%  Some reports say even as high as 50%  If the patient has any changes in her theophylline, lithium should also be adjusted  Monitoring:    Monitor for renal and thyroid dysfunction, skin reactions (acne), weight gain, and diabetes insipidus  Certain medications increase lithium levels and should be avoided such as diuretics, NSAIDS (excluding sulindac), and ACE-I/ARBs  Medications such as theophylline and caffeine may decrease lithium levels  Patient should maintain consistent adequate hydration and consistent caffeine and sodium intake        Lithium:    0  Lab Value Date/Time   LITHIUM 0 5 (L) 01/23/2019 0641       Renal:    0  Lab Value Date/Time   BUN 12 01/17/2019 1408   BUN 12 05/18/2015 0621   CREATININE 0 70 01/17/2019 1408   CREATININE 0 62 05/18/2015 0621       Thyroid:    0  Lab Value Date/Time   [id number] 3 280 01/17/2019 1408   [id number] 0 162 (L) 05/11/2015 0625   FREET4 1 4 05/12/2015 0557       Weight:  Wt Readings from Last 5 Encounters:   01/15/19 66 1 kg (145 lb 11 6 oz)   12/06/18 71 4 kg (157 lb 6 5 oz)   03/14/16 63 5 kg (140 lb)   02/24/16 70 3 kg (154 lb 15 7 oz)   06/30/15 72 6 kg (160 lb)       Lithium Levels    Therapeutic lithium levels are 0 6-1 2 mmol/L, but target range may be 0 8-1 2 mmol/L for acute dylan  Levels above 1 5 mmol/L indicate toxicity, although toxicity may be associated with levels within therapeutic ranges based on symptoms  Mild GI discomfort and slight tremor are typical when initiating lithium, but if these symptoms do not resolve or any other signs of toxicity occur, assess lithium levels immediately  Toxicity    Signs of toxicity include: confusion, difficulty concentrating, vomiting, diarrhea, poor coordination, tremor, abnormal heart rhythm, seizures and coma  Pharmacy will continue to follow patient with team, thank you      Electronically signed by: Amber Membreno, Pharmacist

## 2019-01-25 NOTE — PROGRESS NOTES
Clinical Pharmacy Note: Antipsychotic Monitoring Requirements     Nina Bello is a 66 y o  female who presents with delusions of persecution and is currently taking  quetiapine 300 mg once daily  Performing metabolic monitoring on patients receiving any antipsychotics is a [facility] (CMS) requirement  Antipsychotic treatment increases the risk of developing type 2 diabetes mellitus, hypertension, and hyperlipidemia  According to the [facility]   (NICE) guidelines, baseline weight, waist circumference, pulse, blood pressure, fasting blood glucose, hemoglobin A1c, lipid profile, and prolactin level (if initiating risperidone or paliperidone) should be collected  These guidelines coincide with Centers and Medicare & Medicaid Services (CMS) requirements including baseline Body Mass Index, blood pressure, fasting glucose, hemoglobin A1c, and fasting lipid panel  CMS states laboratory values collected 12 months from discharge date are acceptable for evaluation  CMS Checklist:     BMI: yes  BP: yes  Fasting glucose: yes  A1c within last 12 months: yes  Lipid panel within last 12 months: yes  Nicotine Replacement Therapy: yes    The following values have been collected:  Value Status Result    BMI Body mass index is 25 81 kg/m²     Blood pressure /57 (BP Location: Left arm)   Pulse 73   Temp (!) 97 2 °F (36 2 °C) (Tympanic)   Resp 16   Ht 5' 3" (1 6 m)   Wt 66 1 kg (145 lb 11 6 oz)   SpO2 96%   BMI 25 81 kg/m²    Fasting glucose   0  Lab Value Date/Time   GLUC 124 (H) 01/17/2019 1408      Hemoglobin A1c   0  Lab Value Date/Time   HGBA1C 5 5 01/17/2019 1647   HGBA1C 5 9 (H) 04/27/2015 0530      Lipid panel   0  Lab Value Date/Time   CHOLESTEROL 212 (H) 01/17/2019 1408       0  Lab Value Date/Time   HDL 56 01/17/2019 1408   HDL 74 01/23/2015 0928       0  Lab Value Date/Time   LDLCALC 134 (H) 01/17/2019 1408   LDLCALC 121 (H) 01/23/2015 0928        0  Lab Value Date/Time   TRIG 112 01/17/2019 1408   TRIG 123 01/23/2015 0928        ASCVD risk score: 5 4%  History of ACS, MI, stable angina, stroke, TIA, PAD, coronary/aterial revascularization:  no  LDL cholesterol =>190 mg/dL: no  Age: 66 y o  Diabetes: no  Sex: female  Race: other  HDL Cholesterol: 56  Systolic Blood Pressure: 577  Treatment for Hypertension: no  Smoker: yes  (score reflects the risk of cardiovascular events such as stroke or myocardial infarction in the next 10 years)    Recommendations    Based on the results of hemoglobin A1c, lipid panel, and blood pressure monitoring, Fredi Lao is an potential candidate for no pharmacological interventions and routine monitoring based on  HbA1c<6 5%, ASCVD<7 5% and BP within goal       Based on the above information, pharmacy recommends the following: Continue yearly metabolic monitoring         Pharmacy will continue to follow patient with team   Electronically signed by: Noam Zambrano, Pharmacist

## 2019-01-25 NOTE — PROGRESS NOTES
Pt compliant with treatment and medication  Pt present in the milieu  Pt appears anxious, perseverating on "ear wax and vertigo "  Pt denies SI and HI  Will continue to monitor

## 2019-01-25 NOTE — NURSING NOTE
Patient remains cooperative and pleasant, visual on the unit, ambulates independently 02 NC connected to patient at all times  Oscar Duncan denies SOB, anxiety/hallucinations/ SI  No behavior issues

## 2019-01-25 NOTE — PROGRESS NOTES
Patient spent evening in day room, social with peers  Conversation appropriate and denies [address]  No anxiety or agitation noted

## 2019-01-25 NOTE — CASE MANAGEMENT
Provided pt with complete list of [facility] that referrals have been sent to  CM indicated which ones have denied referral and which ones are pending response  CM will provide same list to pt's sister or son once they connect with CM  CM [facility]eferson[facility]  Pt asked to have a private phone call with Selma Duarte using the [employer] phone on the unit  CM dialed number and made sure call was connected before passing phone to pt  CM left  for pt's sister, Miles Herbert [phone] again to give her update on [facility] referrals and request a family meeting early next week

## 2019-01-26 PROCEDURE — 99231 SBSQ HOSP IP/OBS SF/LOW 25: CPT | Performed by: PSYCHIATRY & NEUROLOGY

## 2019-01-26 RX ADMIN — FLUTICASONE FUROATE AND VILANTEROL TRIFENATATE 1 PUFF: 200; 25 POWDER RESPIRATORY (INHALATION) at 08:47

## 2019-01-26 RX ADMIN — ALBUTEROL SULFATE 2 PUFF: 90 AEROSOL, METERED RESPIRATORY (INHALATION) at 08:51

## 2019-01-26 RX ADMIN — THEOPHYLLINE ANHYDROUS 200 MG: 200 CAPSULE, EXTENDED RELEASE ORAL at 08:44

## 2019-01-26 RX ADMIN — ALBUTEROL SULFATE 2 PUFF: 90 AEROSOL, METERED RESPIRATORY (INHALATION) at 21:27

## 2019-01-26 RX ADMIN — LITHIUM CARBONATE EXTENDED-RELEASE TABLET 300 MG: 300 TABLET, FILM COATED, EXTENDED RELEASE ORAL at 08:44

## 2019-01-26 RX ADMIN — PREDNISONE 5 MG: 5 TABLET ORAL at 08:44

## 2019-01-26 RX ADMIN — QUETIAPINE FUMARATE 300 MG: 300 TABLET ORAL at 21:29

## 2019-01-26 RX ADMIN — LITHIUM CARBONATE 450 MG: 450 TABLET, EXTENDED RELEASE ORAL at 21:29

## 2019-01-26 RX ADMIN — ALBUTEROL SULFATE 2 PUFF: 90 AEROSOL, METERED RESPIRATORY (INHALATION) at 13:30

## 2019-01-26 RX ADMIN — NICOTINE 14 MG: 14 PATCH, EXTENDED RELEASE TRANSDERMAL at 08:55

## 2019-01-26 RX ADMIN — LEVOTHYROXINE SODIUM 125 MCG: 125 TABLET ORAL at 06:08

## 2019-01-26 RX ADMIN — ACETAMINOPHEN 650 MG: 325 TABLET ORAL at 14:55

## 2019-01-26 RX ADMIN — Medication 5 DROP: at 21:29

## 2019-01-26 RX ADMIN — Medication 5 DROP: at 08:47

## 2019-01-26 RX ADMIN — PANTOPRAZOLE SODIUM 20 MG: 20 TABLET, DELAYED RELEASE ORAL at 06:08

## 2019-01-26 NOTE — PLAN OF CARE
Alteration in Thoughts and Perception     Treatment Goal: Gain control of psychotic behaviors/thinking, reduce/eliminate presenting symptoms and demonstrate improved reality functioning upon discharge Progressing        Anxiety     Anxiety is at manageable level Progressing        Depression     Treatment Goal: Demonstrate behavioral control of depressive symptoms, verbalize feelings of improved mood/affect, and adopt new coping skills prior to discharge Progressing     Verbalize thoughts and feelings Progressing     Refrain from harming self Progressing     Refrain from isolation Progressing     Refrain from self-neglect Progressing     Complete daily ADLs, including personal hygiene independently, as able Progressing

## 2019-01-26 NOTE — PROGRESS NOTES
Pt is calm and cooperative  Pt currently reporting anxiety, and depression related to her "clogged ear"  Pt is less intrusive this evening  Pt is seen in the milieu for the majority of the evening  Pt reports pain in her ear tonight, pain resolved with Tylenol  Pt is medication compliant

## 2019-01-26 NOTE — CASE MANAGEMENT
Writer spoke with Verlyn Kocher and Parul Hayes of [facility]  Case reviewed  Pt came in from [facility] that has now expressed they do not believe they are able to meet pt's needs  [facility] was contacted by [facility]- pt able to be discharged, however, [facility] is to assist with transfer/placement  They reached out to [address] [place] [address] to assist with placement  Writer reviewed referrals that have already been initiated  Inquiry made regarding potential for patient to be placed at [facility] is willing to accept and review referral  Per discussion, they believe patient will be eligible, however, need more detailed information for full review and consideration  Writer shared family's wishes for pt to be in [place]  Primary assigned CM informed and will complete and submit referral for review to Parul Hayes at [place] today

## 2019-01-26 NOTE — PROGRESS NOTES
Patient reports having anxiety and depression  She denies SI  Patient's affect has been mostly bright  She is preoccupied with ear wax which she believes is pushing on her inner ear and making her feel of balance  She is only walking with a staff member nearby  Her gait appears steady  She is calm and cooperative

## 2019-01-27 PROBLEM — D17.21 LIPOMA OF RIGHT UPPER EXTREMITY: Status: ACTIVE | Noted: 2019-01-27

## 2019-01-27 PROBLEM — H61.22 EXCESSIVE CERUMEN IN LEFT EAR CANAL: Status: ACTIVE | Noted: 2019-01-27

## 2019-01-27 PROCEDURE — 99231 SBSQ HOSP IP/OBS SF/LOW 25: CPT | Performed by: NURSE PRACTITIONER

## 2019-01-27 PROCEDURE — 99233 SBSQ HOSP IP/OBS HIGH 50: CPT | Performed by: PSYCHIATRY & NEUROLOGY

## 2019-01-27 RX ORDER — BUSPIRONE HYDROCHLORIDE 5 MG/1
5 TABLET ORAL 3 TIMES DAILY
Status: DISCONTINUED | OUTPATIENT
Start: 2019-01-27 | End: 2019-01-28

## 2019-01-27 RX ORDER — LORAZEPAM 0.5 MG/1
0.25 TABLET ORAL ONCE
Status: COMPLETED | OUTPATIENT
Start: 2019-01-27 | End: 2019-01-27

## 2019-01-27 RX ADMIN — LORAZEPAM 0.25 MG: 0.5 TABLET ORAL at 15:23

## 2019-01-27 RX ADMIN — ALBUTEROL SULFATE 2 PUFF: 90 AEROSOL, METERED RESPIRATORY (INHALATION) at 13:40

## 2019-01-27 RX ADMIN — LITHIUM CARBONATE 450 MG: 450 TABLET, EXTENDED RELEASE ORAL at 21:56

## 2019-01-27 RX ADMIN — PREDNISONE 5 MG: 5 TABLET ORAL at 09:07

## 2019-01-27 RX ADMIN — ALBUTEROL SULFATE 2 PUFF: 90 AEROSOL, METERED RESPIRATORY (INHALATION) at 21:32

## 2019-01-27 RX ADMIN — QUETIAPINE FUMARATE 300 MG: 300 TABLET ORAL at 21:56

## 2019-01-27 RX ADMIN — FLUTICASONE FUROATE AND VILANTEROL TRIFENATATE 1 PUFF: 200; 25 POWDER RESPIRATORY (INHALATION) at 09:09

## 2019-01-27 RX ADMIN — NICOTINE 14 MG: 14 PATCH, EXTENDED RELEASE TRANSDERMAL at 09:08

## 2019-01-27 RX ADMIN — PANTOPRAZOLE SODIUM 20 MG: 20 TABLET, DELAYED RELEASE ORAL at 06:21

## 2019-01-27 RX ADMIN — LITHIUM CARBONATE EXTENDED-RELEASE TABLET 300 MG: 300 TABLET, FILM COATED, EXTENDED RELEASE ORAL at 09:07

## 2019-01-27 RX ADMIN — ALBUTEROL SULFATE 2 PUFF: 90 AEROSOL, METERED RESPIRATORY (INHALATION) at 09:02

## 2019-01-27 RX ADMIN — THEOPHYLLINE ANHYDROUS 200 MG: 200 CAPSULE, EXTENDED RELEASE ORAL at 09:06

## 2019-01-27 RX ADMIN — Medication 5 DROP: at 06:20

## 2019-01-27 RX ADMIN — LEVOTHYROXINE SODIUM 125 MCG: 125 TABLET ORAL at 06:21

## 2019-01-27 NOTE — PROGRESS NOTES
Progress Note - Behavioral Health   Chad Meyer 66 y o  female MRN: @MRN   Unit/Bed#Maricel Mleo 505-78 Encounter: [id number]        The patient was seen for continuing care and reviewed with staff  Report from staff regarding this patient received and discussed, and records reviewed prior to seeing this patient   Much more talkative today and less paranoid about the writer, like a different person  The she described a lot of anxiety that goes in her mind the end she looks very anxious  She described the fact that "the ear piece" in her ear affects her very much and that the police placed that ear piece to follow the her and tells her what to do  She believes that she also has some device installed in her right arm showing some the right arm in duration  Pt believes that all of these devices were installed in her while she had surgery some years ago  Allso complained about her loneliness, and the fact that some of the staff members did not understand her anxiety  The patient is also paranoid about the visitor from [facility] and worried about who are the residents at that place  Her paranoia overwhelmed the patient and touches all the aspects of the patient life, significantly interferes with her functions  If not contraindicated, Zyprexa might be considered has a future treatment of paranoia if the patient does not tolerate further increase of Seroquel, and the current dose of Seroquel does not lead to improvement of patient's paranoia and paranoid associated anxiety  The patient has some shortness of breath associated with anxiety      Sleep is poor  Appetite normal    Medication side effects:no complaints  ROS: No     Mental Status Evaluation:    Appearance:  disheveled   Behavior:  cooperative, calm   Mood:  depressed, anxious   Affect: constricted    Speech:  hypertalkative   Language: appropriate   Thought Process:  concrete and illogical   Associations: concrete associations   Thought Content:  paranoid delusions   Perceptual Disturbances: auditory hallucinations   Risk Potential: Suicidal ideation - None at present, contracts for safety on the unit  Homicidal ideation - None  Potential for aggression - No   Sensorium:  oriented to person, place and time   Memory:  long term memory grossly intact   Consciousness:  alert and awake   Attention: attention span and concentration are normal   Fund of Knowledge: past history: limited   Insight:  impaired due to psychosis   Judgment: impaired due to psychosis   Muscle Tone: normal   Gait/Station: normal gait/station and normal balance   Motor Activity: no abnormal movements         Laboratory results:  I have personally reviewed all pertinent laboratory results  Progress Toward Goals: no significant improvement    Assessment/Plan   Principal Problem:    Schizoaffective disorder, bipolar type ([facility])  Active Problems:    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[facility])    Tobacco use disorder, continuous    Chronic respiratory failure ([facility])    Acquired hypothyroidism    Gastroesophageal reflux disease without esophagitis    Abnormal CT of the chest      Recommended Treatment:  Low dose of Ativan 0 25 mg once to deal with severe acute anxiety associated with shortness of breath, start BuSpar 5 mg 3 times to treat chronic anxiety  BuSpar is not contraindicated to people was a the COPD, but the writer would not prescribe the patient is anti anxiety benzodiazepines on a regular basis because of the COPD  The patient declined further increase of Seroquel telling that Seroquel leads to feeling of severe tiredness  Supportive therapy was provided in the sessions that complaints medication management and psychotherapy  The patient's medical condition including right arm in duration was also discussed was a Medicine consult  The the total time contributed to patient care exceeded 40 min  Planned medication and treatment changes:     All current active medications have been reviewed  Continue treatment with group therapy, milieu therapy, occupational therapy and medication management  Risks / Benefits of Treatment:    Risks, benefits, and possible side effects of medications explained to patient  Patient has limited understanding of risks and benefits of treatment at this time, but agrees to take medications as prescribed  Counseling / Coordination of Care:    Patient's progress reviewed with nursing staff  ** Please Note: This note has been constructed using a voice recognition system   **

## 2019-01-27 NOTE — PROGRESS NOTES
Progress Note - Taylor Sena 1955, 66 y o  female MRN: [id number]    Unit/Bed#: Hong Sheffield 647-95 Encounter: [id number]    Primary Care Provider: Yann Garcia MD   Date and time admitted to hospital: 1/15/2019  3:40 PM        Lipoma of right upper extremity   Assessment & Plan    Pt appears to have fatty lipoma of RUE   Believes she has a battery or tracker in her arm  Primary team expressed concerns over the swelling in her foream  Will obtain venous duplex  Pt denies pain  Excessive cerumen in left ear canal   Assessment & Plan    Continue debrox  Pt will need ear irrigated         VTE Pharmacologic Prophylaxis:   Pharmacologic: Enoxaparin (Lovenox)  Mechanical VTE Prophylaxis in Place: No    Patient Centered Rounds: I have performed bedside rounds with nursing staff today  Discussions with Specialists or Other Care Team Provider: Dr Nicolle Rivera psychiatry    Education and Discussions with Family / Patient: Problems and plan of care discussed with pt  Time Spent for Care: 15 minutes  More than 50% of total time spent on counseling and coordination of care as described above  Current Length of Stay: 12 day(s)    Current Patient Status: Inpatient Psych   Certification Statement: The patient will continue to require additional inpatient hospital stay due to psychiatric illness    Discharge Plan: Per primary team when stable  Code Status: Level 1 - Full Code      Subjective:   Pt reports feeling off balance  Feels like her left ear is clogged  Also believes a battery is in her arm  No other complaints  Objective:     Vitals:   Temp (24hrs), Av 6 °F (37 °C), Min:98 1 °F (36 7 °C), Max:99 4 °F (37 4 °C)    Temp:  [98 1 °F (36 7 °C)-99 4 °F (37 4 °C)] 99 4 °F (37 4 °C)  HR:  [72-84] 84  Resp:  [16] 16  BP: (113-153)/(60-87) 117/60  SpO2:  [97 %-98 %] 97 %  Body mass index is 25 81 kg/m²       Input and Output Summary (last 24 hours):     No intake or output data in the 24 hours ending 19 9448    Physical Exam:     Physical Exam   HENT:   Head: Normocephalic and atraumatic  Right Ear: External ear normal    Left Ear: External ear normal    Right ear with TM pearly gray, no erythema or exudate  Left ear impacted with wax  Unable to fully visualize TM  No purulent exudate  Psychiatric:   Hyper-verbal  Delusional         Additional Data:     Labs:                                  * I Have Reviewed All Lab Data Listed Above  * Additional Pertinent Lab Tests Reviewed:  Most recent labs reviewed    Imaging:    Imaging Reports Reviewed Today Include: none  Imaging Personally Reviewed by Myself Includes:  none    Recent Cultures (last 7 days):           Last 24 Hours Medication List:     Current Facility-Administered Medications:  acetaminophen 650 mg Oral Q6H PRN Anson Muck, CRNP   acetaminophen 650 mg Oral Q4H PRN Anson Muck, CRNP   acetaminophen 975 mg Oral Q6H PRN Anson Muck, CRNP   albuterol 2 puff Inhalation TID Jamaal Galan MD   aluminum-magnesium hydroxide-simethicone 30 mL Oral Q4H PRN Anson Muck, CRNP   benztropine 1 mg Intramuscular BID PRN Anson Muck, CRNP   benztropine 1 mg Oral BID PRN Anson Muck, CRNP   busPIRone 5 mg Oral TID Enrico Kenney MD   carbamide peroxide 5 drop Both Ears BID Kay Mcgee MD   enoxaparin 40 mg Subcutaneous Q24H [address] Ale Henderson MD   EPINEPHrine PF 0 15 mg Intramuscular PRN Vicky Ciminieri, CRNP   fluticasone-vilanterol 1 puff Inhalation Daily Betzy Noonan MD   hydrOXYzine HCL 25 mg Oral Q6H PRN Anson Muck, CRNP   levalbuterol 1 25 mg Nebulization Q8H PRN Jamaal Galan MD   Or       sodium chloride 3 mL Nebulization Q8H PRN Jamaal Galan MD   levothyroxine 125 mcg Oral Daily Vicky Ciminieri, CRNP   lithium carbonate 300 mg Oral Daily Charles Kyara, CRNP   lithium carbonate 450 mg Oral HS Charles Kyara, CRNP   LORazepam 1 mg Intramuscular Q4H PRN Anson Muck, CRNP   LORazepam 0 5 mg Oral Q4H PRN Serjio Bonilla Savanna, ABILIO   magnesium hydroxide 30 mL Oral Daily PRN Ladean Fall, CRNP   nicotine 14 mg Transdermal Daily Vicky Ciminieri, CRNP   nicotine polacrilex 2 mg Oral Q2H PRN Ladean Fall, CRNP   pantoprazole 20 mg Oral Daily Vicky Ciminieri, CRNP   predniSONE 5 mg Oral Daily Vicky Ciminieri, CRNP   QUEtiapine 300 mg Oral HS Cortez Morgan, CRNP   theophylline 200 mg Oral Daily ABILIO Mathew   traZODone 25 mg Oral HS PRN Ladean Fall, CRNP        Today, Patient Was Seen By: ABILIO Mathew

## 2019-01-27 NOTE — PROGRESS NOTES
Patient calm, cooperative and compliant with medications  She still has c/o of ears hurting her and making her feel imbalanced when she walks  Informed SLIM  She admits to anxiety and depression but denies SI/ HI/ Hallucinations  Present in the milieu  She does not socialize with peers  1400: Patient was slightly paranoid about visit from representative with [address]

## 2019-01-27 NOTE — PROGRESS NOTES
Progress Note - Behavioral Health   Jose Cardenas 66 y o  female MRN: @MRN   Unit/Bed#Feliberto Knight 663-81 Encounter: [id number]        The patient was seen for continuing care and reviewed with staff  Report from staff regarding this patient received and discussed, and records reviewed prior seeing this patient   She did not endorse paranoid delusions today however the record indicated that the patient is paranoid and delusional but does not all was describe such delusion while talking to new people  She tolerates his Seroquel quite well however the doses relatively small treatment of psychosis  She is also on lithium  The most recent dose was 0 5  The meeting was recently increased  The patient was more somatically preoccupied  She was rather poor historian  She was suspicious about writer's questions  Sleep is  improving  Appetite normal    Medication side effects:no complaints  ROS: No     Mental Status Evaluation:    Appearance:  disheveled   Behavior:  guarded, uncooperative   Mood:  depressed   Affect: constricted    Speech:  decreased rate, slow, poverty of speech   Language: appropriate   Thought Process:  blocked   Associations: concrete associations   Thought Content:  paranoid ideation, negative thinking   Perceptual Disturbances: does not appear responding to internal stimuli   Risk Potential: Suicidal ideation - unable to assess  Homicidal ideation - None, unable to assess  Potential for aggression - Not at present   Sensorium:  unable to assess   Memory:  recent and remote memory: unable to assess due to lack of cooperation   Consciousness:  alert and awake   Attention: decreased concentration   Fund of Knowledge: past history: unable to assess   Insight:  poor   Judgment: impaired due to psychosis   Muscle Tone: normal   Gait/Station: Slow gate   Motor Activity: no abnormal movements         Laboratory results:  I have personally reviewed all pertinent laboratory results      Progress Toward Goals: no significant improvement    Assessment/Plan   Principal Problem:    Schizoaffective disorder, bipolar type ([facility])  Active Problems:    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[facility])    Tobacco use disorder, continuous    Chronic respiratory failure ([facility])    Acquired hypothyroidism    Gastroesophageal reflux disease without esophagitis    Abnormal CT of the chest      Recommended Treatment:  Will continue the present treatment was recently increase lithium  Continue the rest of her medications  Planned medication and treatment changes: All current active medications have been reviewed  Continue treatment with group therapy, milieu therapy, occupational therapy and medication management  Risks / Benefits of Treatment:    Risks, benefits, and possible side effects of medications explained to patient  Patient has limited understanding of risks and benefits of treatment at this time, but agrees to take medications as prescribed  Counseling / Coordination of Care:    Patient's progress reviewed with nursing staff  ** Please Note: This note has been constructed using a voice recognition system   **

## 2019-01-27 NOTE — PROGRESS NOTES
[facility] visited patient  Patient informed me she did not want to accept them because Patricia (representative) would not give her last name or a phone number  She said Betty Lamar said that Carson Guy and Kp Garsia her doctors sent her  She told her I do not have doctors named Carson Garsia  She also asked Patricia want the ratio of men and women were  She was upset that Betty Lamar did not know the ratio

## 2019-01-27 NOTE — ASSESSMENT & PLAN NOTE
Pt appears to have fatty lipoma of RUE   Believes she has a battery or tracker in her arm  Primary team expressed concerns over the swelling in her foream  Will obtain venous duplex  Pt denies pain

## 2019-01-27 NOTE — PROGRESS NOTES
Patient was given one time dose of Ativan but refused scheduled Buspar, "I've had it before it doesn't agree with me "

## 2019-01-28 ENCOUNTER — APPOINTMENT (INPATIENT)
Dept: NON INVASIVE DIAGNOSTICS | Facility: HOSPITAL | Age: 64
DRG: 750 | End: 2019-01-28
Payer: COMMERCIAL

## 2019-01-28 PROCEDURE — 93880 EXTRACRANIAL BILAT STUDY: CPT | Performed by: SURGERY

## 2019-01-28 PROCEDURE — 99232 SBSQ HOSP IP/OBS MODERATE 35: CPT | Performed by: NURSE PRACTITIONER

## 2019-01-28 PROCEDURE — 93971 EXTREMITY STUDY: CPT

## 2019-01-28 RX ORDER — QUETIAPINE FUMARATE 400 MG/1
400 TABLET, FILM COATED ORAL
Status: DISCONTINUED | OUTPATIENT
Start: 2019-01-28 | End: 2019-02-04

## 2019-01-28 RX ADMIN — ALBUTEROL SULFATE 2 PUFF: 90 AEROSOL, METERED RESPIRATORY (INHALATION) at 07:47

## 2019-01-28 RX ADMIN — LITHIUM CARBONATE EXTENDED-RELEASE TABLET 300 MG: 300 TABLET, FILM COATED, EXTENDED RELEASE ORAL at 08:40

## 2019-01-28 RX ADMIN — LEVOTHYROXINE SODIUM 125 MCG: 125 TABLET ORAL at 06:44

## 2019-01-28 RX ADMIN — PREDNISONE 5 MG: 5 TABLET ORAL at 08:40

## 2019-01-28 RX ADMIN — NICOTINE 14 MG: 14 PATCH, EXTENDED RELEASE TRANSDERMAL at 08:37

## 2019-01-28 RX ADMIN — THEOPHYLLINE ANHYDROUS 200 MG: 200 CAPSULE, EXTENDED RELEASE ORAL at 08:35

## 2019-01-28 RX ADMIN — ALBUTEROL SULFATE 2 PUFF: 90 AEROSOL, METERED RESPIRATORY (INHALATION) at 22:19

## 2019-01-28 RX ADMIN — PANTOPRAZOLE SODIUM 20 MG: 20 TABLET, DELAYED RELEASE ORAL at 06:44

## 2019-01-28 RX ADMIN — ALBUTEROL SULFATE 2 PUFF: 90 AEROSOL, METERED RESPIRATORY (INHALATION) at 13:14

## 2019-01-28 RX ADMIN — LITHIUM CARBONATE 450 MG: 450 TABLET, EXTENDED RELEASE ORAL at 22:19

## 2019-01-28 RX ADMIN — QUETIAPINE 400 MG: 400 TABLET, FILM COATED ORAL at 22:18

## 2019-01-28 RX ADMIN — FLUTICASONE FUROATE AND VILANTEROL TRIFENATATE 1 PUFF: 200; 25 POWDER RESPIRATORY (INHALATION) at 08:36

## 2019-01-28 NOTE — CASE MANAGEMENT
Spoke to pt's sister, Miles Herbert [phone], to give update on [facility] search  CM stated that [employer] rep came to assess pt over the weekend and it didn't go well  Miles Herbert stated that pt was very suspicious of the woman she met with because the rep wouldn't give out her last name or phone number  Miles Herbert states that if pt doesn't get the answers she wants, she immediately gets paranoid and thinks things aren't right  CM let Miles Jacquelynadeline know that over 15 referrals have been sent and there have been 7 denials so far  [facility] and [facility] are the 2 ideal placements as of right now but CM is waiting to hear back from both sites  Miles Herbret reports that pt's last bank statement shows that she only gets $1,180 from [employer] each month, not $1800 like pt reported  Miles Herbert still believes that pt is not stable and needs more time in the hospital  She thinks that pt hasn't showered in almost 3 weeks and her paranoia/delusions continue to get worse  Miles Herbert would like to know what the plan is for her meds since they don't seem to be making a big impact yet  CM brought up the topic of POA  Miles Herbert states that pt will say she wants her, her brother, and her son to be joint POA but then change her mind the next day and act like she never said that  Miles Herbert doesn't think that pt will ever sign official POA paperwork  CM inquired about guardianship to see if anyone in the family has ever looked into that option  She stated that they haven't and doesn't know what that would entail  CM gave as much info as possible but recommended that she contact a  to get more info if interested

## 2019-01-28 NOTE — CASE MANAGEMENT
Spoke to Lorena [phone] to follow up on referral that was sent last week  She stated that she's trying to make arrangements for one of her staff to come out and assess pt on the unit but she isn't sure which day yet  CL asked that she keep us updated

## 2019-01-28 NOTE — PLAN OF CARE
Problem: Ineffective Coping  Goal: Participates in unit activities  Interventions:  - Provide therapeutic environment   - Provide required programming   - Redirect inappropriate behaviors    Outcome: Progressing  Pt attended one morning group displaying less irritability but continues to sit by herself rather than to attend the group table with peers

## 2019-01-28 NOTE — CASE MANAGEMENT
Spoke with Aracely Yanez, Admissions Director at [facility] and [facility]) [phone] to follow-up on referral and assessment that was done over the weekend  Alessia Oswald stated that it was not a good assessment and that pt was very paranoid, irritable, confused, and accusatory  Alessia Oswald was hoping that pt would have been more appropriate so she would have been able to get her into their [facility] facility but she isn't able to accept pt  She suggested that CM contact [facility] as they might be a better fit   CM stated that they were already contacted about referral

## 2019-01-28 NOTE — PROGRESS NOTES
Pt preoccupied with her visit from [address] earlier in the day  States the women did not like her and refused to give her name  States she will no go to [address]  Pt spoke to son on the phone about this and son disagreed with pt and thought the pt should give [employer] a chance  Pt agitated over this, medicated with scheduled medication  Pt now calm and resting in bed

## 2019-01-28 NOTE — PROGRESS NOTES
When offered shower patient stated my ear has to get better   That's very important   Awaiting medical to see the patient

## 2019-01-28 NOTE — PROGRESS NOTES
Progress Note - Behavioral Health   Eric [place] 66 y o  female MRN: [id number]  Unit/Bed#: Rodriguez Arriaga 990-36 Encounter: [id number]    The patient was seen for continuing care and reviewed with treatment team   Staff reports that the patient remains somatic  She is still refusing to shower  We are looking for placement  This morning the patient says she is anxious and depressed  She states she is sleeping well and her appetite is okay  No suicidal thoughts  She appears to be having auditory hallucinations because she believes there is a broadcast in the TV room that most people can't hear except for her, she also hears this in her room sometimes  The patient remains extremely delusional and paranoid thinking that her family is out to get her because in the past they tried to [facility] [place] and then they got sent to snf at [employer]  States her sister forges her documents and blackmails her  Believes her sister's children are actually her own children but then later said that her niece is actually her 's daughter from an affair with the sister  She was hyperverbal and rambling and extremely tangential and difficult to redirect  She was assessed by a facility yesterday but she was very suspicious of them because they would not give the last name or phone number and she fears that she will be kidnapped and killed if she does not obtain this information  She also wanted to know the ratio of men to women at the facility and does not feel like she wants to go there unless there is an equal ratio  Remains somatic and focused on her ear fullness and says she feels off balance  She is still refusing to take a shower because she does not want to get her ear wet and she is afraid to fall even though I explained they would use a shower chair      Mental Status Evaluation:  Appearance:  Good eye contact and disheveled   Behavior:  cooperative   Mood:  anxious and depressed   Affect: constricted Speech: Hyperverbal   Thought Process: Tangential   Thought Content:  Paranoid and mistrustful, Delusions of persecution and is somatically preoccupied   Perceptual Disturbances: Auditory hallucinations without commands   Risk Potential: No suicidal or homicidal ideation   Attention/Concentration [place] than expected   Orientation:   Oriented x3   Gait/Station:  needs assistive device   Motor Activity: No abnormal movement noted     Progress Toward Goals:  Remains paranoid and delusional    Assessment/Plan    Principal Problem:    Schizoaffective disorder, bipolar type ([facility])  Active Problems:    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[facility])    Tobacco use disorder, continuous    Chronic respiratory failure ([facility])    Acquired hypothyroidism    Gastroesophageal reflux disease without esophagitis    Abnormal CT of the chest    Excessive cerumen in left ear canal    Lipoma of right upper extremity      Recommended Treatment:     Continue lithium 300 mg q a m  and 450 mg Q HS  Check lithium level tomorrow  Increase Seroquel to 400 mg HS  Discontinue BuSpar  Continue with pharmacotherapy, group therapy, milieu therapy and occupational therapy    The patient will be maintained on the following medications:    Current Facility-Administered Medications:  acetaminophen 650 mg Oral Q6H PRN Beni Carpen, CRNP   acetaminophen 650 mg Oral Q4H PRN Beni Carpen, CRNP   acetaminophen 975 mg Oral Q6H PRN Beni Carpen, CRNP   albuterol 2 puff Inhalation TID Ramona Reed MD   aluminum-magnesium hydroxide-simethicone 30 mL Oral Q4H PRN Beni Carpen, CRNP   benztropine 1 mg Intramuscular BID PRN Beni Carpen, CRNP   benztropine 1 mg Oral BID PRN Beni Carpen, CRNP   carbamide peroxide 5 drop Both Ears BID Lala Fitzpatrick MD   enoxaparin 40 mg Subcutaneous Q24H [address] Amy Powell MD   EPINEPHrine PF 0 15 mg Intramuscular PRN Vicky Ciminieri, CRNP   fluticasone-vilanterol 1 puff Inhalation Daily Rafal Malave MD   hydrOXYzine HCL 25 mg Oral Q6H PRN Noreene Banco, CRNP   levalbuterol 1 25 mg Nebulization Q8H PRN Sarah Sims MD   Or       sodium chloride 3 mL Nebulization Q8H PRN Sarah Sims MD   levothyroxine 125 mcg Oral Daily Vicky Ciminieri, CRNP   lithium carbonate 300 mg Oral Daily Sheralyn Ends, CRNP   lithium carbonate 450 mg Oral HS Sheralyn Ends, CRNP   LORazepam 1 mg Intramuscular Q4H PRN Noreene Banco, CRNP   LORazepam 0 5 mg Oral Q4H PRN Noreene Banco, CRNP   magnesium hydroxide 30 mL Oral Daily PRN Noreene Banco, CRNP   nicotine 14 mg Transdermal Daily Shani Maninder, CRNP   nicotine polacrilex 2 mg Oral Q2H PRN Noreene Banco, CRNP   pantoprazole 20 mg Oral Daily Vicky Ciminieri, CRNP   predniSONE 5 mg Oral Daily Vicky Ciminieri, CRNP   QUEtiapine 400 mg Oral HS Sheralyn Ends, CRNP   theophylline 200 mg Oral Daily Shani Maninder, CRNP   traZODone 25 mg Oral HS PRN Noreene Banco, CRNP       Risks, benefits and possible side effects of Medications:   Patient does not verbalize understanding at this time and will require further explanation

## 2019-01-28 NOTE — CASE MANAGEMENT
CM left another  for pt's sister, Jarad [phone], to see if she would be visiting pt this afternoon and if we could meet then

## 2019-01-28 NOTE — CASE MANAGEMENT
Left VM for Sakina Mcneal, [facility] Coordinator [phone] regarding referral that was sent on Friday for [facility]

## 2019-01-28 NOTE — PROGRESS NOTES
Patient very somatic, attention seeking   Visible in the unit  Refused Buspar and Lovenox   states '' I don't need them '' constantly focused on ear flush   Hussain Martinez NP aware   R arm edema  Venous duplex later   Will continue to monitor

## 2019-01-29 LAB — LITHIUM SERPL-SCNC: 0.8 MMOL/L (ref 0.6–1.2)

## 2019-01-29 PROCEDURE — 99232 SBSQ HOSP IP/OBS MODERATE 35: CPT | Performed by: NURSE PRACTITIONER

## 2019-01-29 PROCEDURE — 80178 ASSAY OF LITHIUM: CPT | Performed by: NURSE PRACTITIONER

## 2019-01-29 RX ADMIN — THEOPHYLLINE ANHYDROUS 200 MG: 200 CAPSULE, EXTENDED RELEASE ORAL at 09:15

## 2019-01-29 RX ADMIN — LITHIUM CARBONATE EXTENDED-RELEASE TABLET 300 MG: 300 TABLET, FILM COATED, EXTENDED RELEASE ORAL at 12:43

## 2019-01-29 RX ADMIN — ALBUTEROL SULFATE 2 PUFF: 90 AEROSOL, METERED RESPIRATORY (INHALATION) at 20:59

## 2019-01-29 RX ADMIN — ALBUTEROL SULFATE 2 PUFF: 90 AEROSOL, METERED RESPIRATORY (INHALATION) at 09:14

## 2019-01-29 RX ADMIN — PANTOPRAZOLE SODIUM 20 MG: 20 TABLET, DELAYED RELEASE ORAL at 06:07

## 2019-01-29 RX ADMIN — FLUTICASONE FUROATE AND VILANTEROL TRIFENATATE 1 PUFF: 200; 25 POWDER RESPIRATORY (INHALATION) at 09:15

## 2019-01-29 RX ADMIN — PREDNISONE 5 MG: 5 TABLET ORAL at 09:15

## 2019-01-29 RX ADMIN — NICOTINE 14 MG: 14 PATCH, EXTENDED RELEASE TRANSDERMAL at 09:17

## 2019-01-29 RX ADMIN — ALBUTEROL SULFATE 2 PUFF: 90 AEROSOL, METERED RESPIRATORY (INHALATION) at 13:32

## 2019-01-29 RX ADMIN — QUETIAPINE 400 MG: 400 TABLET, FILM COATED ORAL at 22:19

## 2019-01-29 RX ADMIN — LITHIUM CARBONATE 450 MG: 450 TABLET, EXTENDED RELEASE ORAL at 22:19

## 2019-01-29 RX ADMIN — LEVOTHYROXINE SODIUM 125 MCG: 125 TABLET ORAL at 06:07

## 2019-01-29 RX ADMIN — Medication 5 DROP: at 18:04

## 2019-01-29 NOTE — PROGRESS NOTES
Progress Note - Behavioral Health   Homero Clinton 66 y o  female MRN: [id number]  Unit/Bed#: Narinder Pérez 249-23 Encounter: [id number]    The patient was seen for continuing care and reviewed with treatment team   Staff reports that the patient remains somatic and entitled  She has been refusing her pre ear flush ear drops but demanding her ear flush  She is refusing to shower until she gets her ear flush  Multiple complaints about her oxygen, meds, and all the staff  The patient remains delusional, paranoid, grandiose, demanding and entitled  She has multiple somatic complaints today  She says she is having vision and ear problems and feels horrible because of it  She states that she can't hear  She says she can see, here and breathe better in her room  I explained the purpose of the ear drops and she said that no one told her that before  Has been engaging in staff splitting behavior  States that staff are all giving her an attitude  Says that her oxygen level has been low and her oxygen tank has been empty and no oxygen is coming through the nasal cannula despite nursing checking and her oxygen saturation was in normal limits and tank was full  Thinks staff are not doing their jobs appropriately and sabotaging her oxygen tank  Mood is horrible  Appetite and sleep have been normal  Denies SI  Continues to her refuse a shower and continues with poor choices and poor self-care  Mental Status Evaluation:  Appearance:  Good eye contact and disheveled   Behavior:  cooperative   Mood:  irritable   Affect: constricted   Speech: Rapid, rambling, hyperverbal   Thought Process:   Tangential   Thought Content:  Paranoid and mistrustful, Delusions of persecution and is somatically preoccupied   Perceptual Disturbances: Denies hallucinations and does not appear to be responding to internal stimuli   Risk Potential: No suicidal or homicidal ideation   Attention/Concentration [place] than expected Orientation:   Oriented x3   Gait/Station: Not observed and  needs assistive device   Motor Activity: No abnormal movement noted     Progress Toward Goals:  No change    Assessment/Plan    Principal Problem:    Schizoaffective disorder, bipolar type (HCC)  Active Problems:    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HCC)    Tobacco use disorder, continuous    Chronic respiratory failure (HCC)    Acquired hypothyroidism    Gastroesophageal reflux disease without esophagitis    Abnormal CT of the chest    Excessive cerumen in left ear canal    Lipoma of right upper extremity      Recommended Treatment: We are waiting for a lithium level  Continue lithium 300 mg q a m  snd 450 mg Q HS  Seroquel was increased last night to 400 mg HS  Continue current dose for now  Continue with pharmacotherapy, group therapy, milieu therapy and occupational therapy    The patient will be maintained on the following medications:    Current Facility-Administered Medications:  acetaminophen 650 mg Oral Q6H PRN Gordon , CRNP   acetaminophen 650 mg Oral Q4H PRN Gordon , CRNP   acetaminophen 975 mg Oral Q6H PRN Gordon , CRNP   albuterol 2 puff Inhalation TID Erlin Mcconnell MD   aluminum-magnesium hydroxide-simethicone 30 mL Oral Q4H PRN Gordon , CRNP   benztropine 1 mg Intramuscular BID PRN Gordon , CRNP   benztropine 1 mg Oral BID PRN Gordon , CRNP   carbamide peroxide 5 drop Both Ears BID Ruth Goodwin MD   enoxaparin 40 mg Subcutaneous Q24H [address] Kirk Paz MD   EPINEPHrine PF 0 15 mg Intramuscular PRN Vicky Cimryan, JOSE CARLOSNP   fluticasone-vilanterol 1 puff Inhalation Daily Bryson Eldridge MD   hydrOXYzine HCL 25 mg Oral Q6H PRN Gordon , ABILIO   levalbuterol 1 25 mg Nebulization Q8H PRN Erlin Mcconnell MD   Or       sodium chloride 3 mL Nebulization Q8H PRN Erlin Mcconnell MD   levothyroxine 125 mcg Oral Daily Vickyshailesh Fields, ABILIO   lithium carbonate 300 mg Oral Daily ABILIO Betts lithium carbonate 450 mg Oral HS Lazaro Taylor, CRNP   LORazepam 1 mg Intramuscular Q4H PRN Omega Beery, CRNP   LORazepam 0 5 mg Oral Q4H PRN Omega Beery, CRNP   magnesium hydroxide 30 mL Oral Daily PRN Omega Beery, CRNP   nicotine 14 mg Transdermal Daily Vicky Ciminieri, CRNP   nicotine polacrilex 2 mg Oral Q2H PRN Omega Beery, CRNP   pantoprazole 20 mg Oral Daily Vicky Ciminieri, CRNP   predniSONE 5 mg Oral Daily Vicky Ciminieri, CRNP   QUEtiapine 400 mg Oral HS Lazaorhedy Taylor, CRNP   theophylline 200 mg Oral Daily Pearl Valenzuela, CRNP   traZODone 25 mg Oral HS PRN Omega Beery, CRNP       Risks, benefits and possible side effects of Medications:   Patient does not verbalize understanding at this time and will require further explanation

## 2019-01-29 NOTE — CASE MANAGEMENT
Left another VM for Rinku Gutierrez [facility] Coordinator [phone] regarding referral that was sent on Friday for [facility]

## 2019-01-29 NOTE — PROGRESS NOTES
Awake, alert, oriented, poor insight  Irritable, demanding, somatic, delusional  Refusing Lovenox, otherwise medication compliant at this time  Currently resting in dining room  Will continue to monitor and encourage compliance

## 2019-01-29 NOTE — PLAN OF CARE
Problem: Ineffective Coping  Goal: Participates in unit activities  Interventions:  - Provide therapeutic environment   - Provide required programming   - Redirect inappropriate behaviors    Outcome: Not Progressing  Pt attended one of three groups and refused others due to her personal preferences

## 2019-01-29 NOTE — PROGRESS NOTES
Pt remains preoccupied and paranoid regarding health status in relation to 3L cont O2  Pt asking multiple times to have SpO2 checked as she is concerned she is not at an appropriate saturation level  Even with repeated reassurance and checks of her SpO2 pt is still paranoid regarding this  Pt also expressing that she is upset with staff and singled out an MHT and accused her of not entering correct SpO2 checks into flowsheets  Pt otherwise can be intrusive and needing redirection at times  She was medication compliant  Will continue to monitor

## 2019-01-30 PROCEDURE — 99232 SBSQ HOSP IP/OBS MODERATE 35: CPT | Performed by: PSYCHIATRY & NEUROLOGY

## 2019-01-30 RX ADMIN — ALBUTEROL SULFATE 2 PUFF: 90 AEROSOL, METERED RESPIRATORY (INHALATION) at 20:00

## 2019-01-30 RX ADMIN — LITHIUM CARBONATE 450 MG: 450 TABLET, EXTENDED RELEASE ORAL at 22:14

## 2019-01-30 RX ADMIN — FLUTICASONE FUROATE AND VILANTEROL TRIFENATATE 1 PUFF: 200; 25 POWDER RESPIRATORY (INHALATION) at 08:31

## 2019-01-30 RX ADMIN — PREDNISONE 5 MG: 5 TABLET ORAL at 08:31

## 2019-01-30 RX ADMIN — PANTOPRAZOLE SODIUM 20 MG: 20 TABLET, DELAYED RELEASE ORAL at 06:24

## 2019-01-30 RX ADMIN — ALBUTEROL SULFATE 2 PUFF: 90 AEROSOL, METERED RESPIRATORY (INHALATION) at 14:25

## 2019-01-30 RX ADMIN — ALBUTEROL SULFATE 2 PUFF: 90 AEROSOL, METERED RESPIRATORY (INHALATION) at 08:36

## 2019-01-30 RX ADMIN — NICOTINE 14 MG: 14 PATCH, EXTENDED RELEASE TRANSDERMAL at 08:29

## 2019-01-30 RX ADMIN — THEOPHYLLINE ANHYDROUS 200 MG: 200 CAPSULE, EXTENDED RELEASE ORAL at 08:30

## 2019-01-30 RX ADMIN — Medication 5 DROP: at 06:23

## 2019-01-30 RX ADMIN — QUETIAPINE 400 MG: 400 TABLET, FILM COATED ORAL at 22:14

## 2019-01-30 RX ADMIN — LITHIUM CARBONATE EXTENDED-RELEASE TABLET 300 MG: 300 TABLET, FILM COATED, EXTENDED RELEASE ORAL at 08:31

## 2019-01-30 RX ADMIN — LEVOTHYROXINE SODIUM 125 MCG: 125 TABLET ORAL at 06:24

## 2019-01-30 RX ADMIN — Medication 5 DROP: at 22:14

## 2019-01-30 NOTE — CASE MANAGEMENT
Met with pt and Dr Eva Montoya to review forms for [facility]  Pt was agreeable to signing paperwork   Forms faxed to Susan Holman at [address] [place] [address]

## 2019-01-30 NOTE — CASE MANAGEMENT
Spoke with pt to let her know that a rep from [facility] will be coming today to meet with her for potential admission into their facility  Pt stated that she was not interested -- she's heard terrible things about that place, she's known other people who have been there and have had terrible things happen to them  She states that she is not ready to leave the hospital and wants to wait to see if another option opens up in the meantime  CM was very direct with pt and stated that we are out of options and pt will need to go to whichever place accepts her  CM reminded her that she came to us because she wanted to be in [facility] and this placement would give her [facility] residency  CM also asked pt about assigning a POA and she immediately shut that down and stated that she is not interested and nobody in her family is stable enough to be her POA  She states that her brother and sister are physically and mentally well but they cannot fulfill that role  Lastly, CL asked pt is she's called [facility] to switch her benefits into the [facility]  She stated that the doctor told her not to do that yet and she will need to speak with him before making any drastic changes  Pt continues to state that CM is not the one who determines things and she needs to speak with the doctor before anything moves forward

## 2019-01-30 NOTE — PROGRESS NOTES
Pt is cooperative with care and medication compliant  Pt reports anxiety and depression, but denies all other s/s including SI and HI  Pt states " When I talk to my doctor today I am going to tell him that I need more time here  I do not feel that I am where I would like to be"  Pt is visible on unit and comes out for meals and activities  Pt is social with select peers and is currently sitting in the day room with peers  Will continue to monitor

## 2019-01-30 NOTE — CASE MANAGEMENT
Met with pt again to give update [facility] admission and review/sign necessary forms  Pt continues to be paranoid about placement  Pt was agreeable to looking over the forms but she refused to sign anything at this time  She stated that she signed paperwork a long time ago when she was still  and had no idea what she was signing and it got her into trouble  She is afraid that she'll sign something again that will cause her trouble and send her somewhere she doesn't want to go to  Pt states that she needs to speak with Dr Anu Celaya first before signing anything  She doesn't want to see another doctor as she wants to keep things consistent with 1 doctor  CM explained that this opportunity is time sensitive and it is the best option for her if she wants to continue residing in [address] [place] [address] consulted with Dr Anu Celaya and Dr Wing Ferrari  Paperwork still needs to be signed in order to move forward with [facility] placement

## 2019-01-30 NOTE — CASE MANAGEMENT
LATRICIA from [facility] MH/ID regarding [facility] referral that was sent  She stated that they could not accept referral because pt is still a [facility] resident and she would need [address] before they could accept her  CM and CM Manager called Gustavo Garcia [phone] to explain that pt is essentially a [place] resident as she was living in [address] [facility] that closed down and was moved out of [facility] then  CM manager also stated that Becky Mahmood instructed us to send referral in because the residency issue would not effect potential placement due to the nature of this situation  Gustavo Garcia will follow up and get back to CM  Gustavo Garcia is faxing CM a liability packet that pt needs to sign  She will also need copies of pt's SS card, ID, and insurance card  CM will complete with pt and send back to Gustavo Garcia

## 2019-01-30 NOTE — PLAN OF CARE
Problem: Ineffective Coping  Goal: Participates in unit activities  Interventions:  - Provide therapeutic environment   - Provide required programming   - Redirect inappropriate behaviors    Outcome: Progressing  Pt less confrontive in sessions  Attended two of three groups  Joins group table when she is ready yet not when sessions begin

## 2019-01-30 NOTE — PROGRESS NOTES
Pt visible in milieu interacting with select peers  Irritable at times, but remained appropriate  Compliant with medications this shift on 3L O2 via N/C  Denies SI/HI - pt quite entitled and demanding at times, but less so than reported from previous shift  No complaints about staff this shift

## 2019-01-31 PROCEDURE — 99231 SBSQ HOSP IP/OBS SF/LOW 25: CPT | Performed by: PSYCHIATRY & NEUROLOGY

## 2019-01-31 RX ORDER — QUETIAPINE FUMARATE 50 MG/1
50 TABLET, FILM COATED ORAL DAILY
Status: DISCONTINUED | OUTPATIENT
Start: 2019-01-31 | End: 2019-03-20 | Stop reason: HOSPADM

## 2019-01-31 RX ADMIN — ALBUTEROL SULFATE 2 PUFF: 90 AEROSOL, METERED RESPIRATORY (INHALATION) at 14:03

## 2019-01-31 RX ADMIN — ALBUTEROL SULFATE 2 PUFF: 90 AEROSOL, METERED RESPIRATORY (INHALATION) at 20:03

## 2019-01-31 RX ADMIN — Medication 5 DROP: at 06:11

## 2019-01-31 RX ADMIN — Medication 5 DROP: at 21:48

## 2019-01-31 RX ADMIN — FLUTICASONE FUROATE AND VILANTEROL TRIFENATATE 1 PUFF: 200; 25 POWDER RESPIRATORY (INHALATION) at 08:29

## 2019-01-31 RX ADMIN — LITHIUM CARBONATE 450 MG: 450 TABLET, EXTENDED RELEASE ORAL at 21:49

## 2019-01-31 RX ADMIN — PANTOPRAZOLE SODIUM 20 MG: 20 TABLET, DELAYED RELEASE ORAL at 06:11

## 2019-01-31 RX ADMIN — PREDNISONE 5 MG: 5 TABLET ORAL at 08:28

## 2019-01-31 RX ADMIN — QUETIAPINE 400 MG: 400 TABLET, FILM COATED ORAL at 21:49

## 2019-01-31 RX ADMIN — THEOPHYLLINE ANHYDROUS 200 MG: 200 CAPSULE, EXTENDED RELEASE ORAL at 08:28

## 2019-01-31 RX ADMIN — ALBUTEROL SULFATE 2 PUFF: 90 AEROSOL, METERED RESPIRATORY (INHALATION) at 08:30

## 2019-01-31 RX ADMIN — LEVOTHYROXINE SODIUM 125 MCG: 125 TABLET ORAL at 06:11

## 2019-01-31 RX ADMIN — NICOTINE 14 MG: 14 PATCH, EXTENDED RELEASE TRANSDERMAL at 08:32

## 2019-01-31 RX ADMIN — LITHIUM CARBONATE EXTENDED-RELEASE TABLET 300 MG: 300 TABLET, FILM COATED, EXTENDED RELEASE ORAL at 08:28

## 2019-01-31 NOTE — PLAN OF CARE
Problem: Ineffective Coping  Goal: Participates in unit activities  Interventions:  - Provide therapeutic environment   - Provide required programming   - Redirect inappropriate behaviors    Outcome: Progressing  Pt less contradictive in group interactions  Showerted with assist of MHTs  Displayed creativity in art relaxation and jovial nature in memory game task with peers

## 2019-01-31 NOTE — PROGRESS NOTES
Clinical Pharmacy Note: [facility]     Violetta Cage is a 66 y o  female who presents with delusions of persecution    Assessment/Plan:    Lithium indication: mood disorder  Davidson Jorge is currently taking 750 mg lithium carbonate tablet as 300 mg AM and 450 mg PM   Lithium concentration is 0 8 mmol/L drawn at steady state  Continue current-dose/current dosing      The pharmacist has checked for drug interactions, lithium levels, kidney function, thyroid function  YES    Pharmacy Recommendations:   Continue current regimen  NOTE* theophylline increases lithium clearance and therefore decreases serum concentration by about 30%  Some reports say even as high as 50%  If the patient has any changes in her theophylline, lithium should also be adjusted  Monitoring:    Monitor for renal and thyroid dysfunction, skin reactions (acne), weight gain, and diabetes insipidus  Certain medications increase lithium levels and should be avoided such as diuretics, NSAIDS (excluding sulindac), and ACE-I/ARBs  Medications such as theophylline and caffeine may decrease lithium levels  Patient should maintain consistent adequate hydration and consistent caffeine and sodium intake  Lithium:    0  Lab Value Date/Time   LITHIUM 0 8 01/29/2019 1006       Renal:    0  Lab Value Date/Time   BUN 12 01/17/2019 1408   BUN 12 05/18/2015 0621   CREATININE 0 70 01/17/2019 1408   CREATININE 0 62 05/18/2015 0621       Thyroid:    0  Lab Value Date/Time   [id number] 3 280 01/17/2019 1408   UBU1CQAACHBJ 0 162 (L) 05/11/2015 0625   FREET4 1 4 05/12/2015 0557       Weight:  Wt Readings from Last 5 Encounters:   01/15/19 66 1 kg (145 lb 11 6 oz)   12/06/18 71 4 kg (157 lb 6 5 oz)   03/14/16 63 5 kg (140 lb)   02/24/16 70 3 kg (154 lb 15 7 oz)   06/30/15 72 6 kg (160 lb)       Lithium Levels    Therapeutic lithium levels are 0 6-1 2 mmol/L, but target range may be 0 8-1 2 mmol/L for acute dylan   Levels above 1 5 mmol/L indicate toxicity, although toxicity may be associated with levels within therapeutic ranges based on symptoms  Mild GI discomfort and slight tremor are typical when initiating lithium, but if these symptoms do not resolve or any other signs of toxicity occur, assess lithium levels immediately  Toxicity    Signs of toxicity include: confusion, difficulty concentrating, vomiting, diarrhea, poor coordination, tremor, abnormal heart rhythm, seizures and coma  Pharmacy will continue to follow patient with team, thank you      Electronically signed by: Jolynn Moses, Pharmacist

## 2019-01-31 NOTE — PROGRESS NOTES
Pt continues to be preoccupied with ear wax and insisting she can't walk on her own, shower, or dress her self  With encouragement she showered today with minimal assistance  Pt has been visible in day room, attended groups , and is seen walking with no assistance in dayroom  She denies SI/HI and refused to take Seroquel because she wants to speak to the doctor first because she doesn't believe she needs this mediation  Dr Gabriela Obrien made aware  Will continue to monitor

## 2019-01-31 NOTE — PROGRESS NOTES
Pt visible in milieu interacting with peers  Pt OOB for meals and med compliant  Continues with portable 3L O2 via NC  Denies SI/HI at this time  Will continue to monitor

## 2019-01-31 NOTE — PROGRESS NOTES
[facility]  Psychiatrists  Progress Note    Patient Name: Rosana Lindsay  MRN: [id number]  DOS: 01/31/19     Chief Complaint:  psychosis    Interval History: Patient has been med compliant  Attends group activities only superficially, sitting off to a distance  Has not been showering for many days now - she reports multiple reasons - fear of falling, wanting specific staff to help her, that the timing is not right (wants to finish digesting, etc)  She has frequent, random, varied catastrophization which create a barrier at almost every point in decision making  Multiple paranoid delusions persist  Somatically preoccupied, particularly with ear fullness    Medication compliant  Adverse effects of medications: denies      Mental Status Exam [Per above +]    Appearance: disheveled, poor hygiene; no psychomotor agitation; no appearance of effortful breathing  Behavior: attentive, still trying to dominate conversation; no psychomotor agitation/retardation  Speech: well articulated, pressured but not rapid  Mood: anxious  Affect: congruent but stable, reactive  Thought process: illogical, tangential  Thought content: paranoid delusions are ongoin  Perceptual disturbances: denies AH/VH  Cognition: oriented to all domains     Insight: poor  Judgement: limited      Current Facility-Administered Medications:     acetaminophen (TYLENOL) tablet 650 mg, 650 mg, Oral, Q6H PRN, Marcus Mode, CRNP    acetaminophen (TYLENOL) tablet 650 mg, 650 mg, Oral, Q4H PRN, Marcus Mode, CRNP, 650 mg at 01/26/19 1455    acetaminophen (TYLENOL) tablet 975 mg, 975 mg, Oral, Q6H PRN, Marcus Mode, CRNP    albuterol (PROVENTIL HFA,VENTOLIN HFA) inhaler 2 puff, 2 puff, Inhalation, TID, Sukh James MD, 2 puff at 01/31/19 0830    aluminum-magnesium hydroxide-simethicone (MYLANTA) 200-200-20 mg/5 mL oral suspension 30 mL, 30 mL, Oral, Q4H PRN, Richa Pink ABILIO Corrales    benztropine (COGENTIN) injection 1 mg, 1 mg, Intramuscular, BID PRN, ABILIO Matute    benztropine (COGENTIN) tablet 1 mg, 1 mg, Oral, BID PRN, ABILIO Matute    carbamide peroxide (DEBROX) 6 5 % otic solution 5 drop, 5 drop, Both Ears, BID, Lala Fitzpatrick MD, 5 drop at 01/31/19 0611    enoxaparin (LOVENOX) subcutaneous injection 40 mg, 40 mg, Subcutaneous, Q24H [address], Amy Powell MD, Stopped at 01/18/19 0957    EPINEPHrine PF (ADRENALIN) 1 mg/mL injection 0 15 mg, 0 15 mg, Intramuscular, PRN, ABILIO Butterfield    fluticasone-vilanterol (BREO ELLIPTA) 200-25 MCG/INH inhaler 1 puff, 1 puff, Inhalation, Daily, Rafal Malave MD, 1 puff at 01/31/19 0829    hydrOXYzine HCL (ATARAX) tablet 25 mg, 25 mg, Oral, Q6H PRN, ABILIO Matute    levalbuterol (XOPENEX) inhalation solution 1 25 mg, 1 25 mg, Nebulization, Q8H PRN, 1 25 mg at 01/20/19 1325 **OR** sodium chloride 0 9 % inhalation solution 3 mL, 3 mL, Nebulization, Q8H PRN, Ramona Reed MD, 3 mL at 01/19/19 2303    levothyroxine tablet 125 mcg, 125 mcg, Oral, Daily, ABILIO Nguyen, 125 mcg at 01/31/19 0611    lithium carbonate (LITHOBID) CR tablet 300 mg, 300 mg, Oral, Daily, Shelba Rashaad, CRNP, 300 mg at 01/31/19 6337    lithium carbonate (LITHOBID) CR tablet 450 mg, 450 mg, Oral, HS, Shelba Rashaad, CRNP, 450 mg at 01/30/19 2214    LORazepam (ATIVAN) 2 mg/mL injection 1 mg, 1 mg, Intramuscular, Q4H PRN, ABILIO Matute    LORazepam (ATIVAN) tablet 0 5 mg, 0 5 mg, Oral, Q4H PRN, ABILIO Matute    magnesium hydroxide (MILK OF MAGNESIA) 400 mg/5 mL oral suspension 30 mL, 30 mL, Oral, Daily PRN, ABILIO Matute    nicotine (NICODERM CQ) 14 mg/24hr TD 24 hr patch 14 mg, 14 mg, Transdermal, Daily, ABILIO Nguyen, 14 mg at 01/31/19 6361    nicotine polacrilex (NICORETTE) gum 2 mg, 2 mg, Oral, Q2H PRN, ABILIO Matute    pantoprazole (PROTONIX) EC tablet 20 mg, 20 mg, Oral, Daily, Vicky Fields CRNP, 20 mg at 01/31/19 2193    predniSONE tablet 5 mg, 5 mg, Oral, Daily, Vicky Amosinifaisal, CRNP, 5 mg at 01/31/19 2552    QUEtiapine (SEROquel) tablet 400 mg, 400 mg, Oral, HS, JOSE CARLOS BettsNP, 400 mg at 01/30/19 2214    QUEtiapine (SEROquel) tablet 50 mg, 50 mg, Oral, Daily, No Willis MD    theophylline (JEF-24) 24 hr capsule 200 mg, 200 mg, Oral, Daily, Vickyshailesh Fields, CRNP, 200 mg at 01/31/19 7307    traZODone (DESYREL) tablet 25 mg, 25 mg, Oral, HS PRN, ABILIO Power    Vitals:    01/31/19 0742   BP: 144/67   Pulse: 78   Resp: 18   Temp: 97 7 °F (36 5 °C)   SpO2: 96%       Lab/work up: Li 0 8    Assessment:   Schizoaffective d/o, bipolar type  Cluster B traits    Progress: psychosis ongoing    Plan:   1  Disposition: Patient meets criteria for ongoing acute inpatient treatment due to being a danger to self 2/2 paranoid delusions and evident poor self care  Patient will be discharged when there is an absence of psychosis [id number]  2  Legal status: voluntary  3  Psychopharmacologic interventions:  - continue seroquel 50mg po daily and 400mg po qhs for psychosis   - continue lithium 300mg po daily and 450mg po qhs for mood stabilization  - Continue to monitor psychosis  4  Medical comorbidities: Hospitalist assessment appreciated  5  Other therapies: Individual/group/milieu therapy as appropriate   6  Social issues: Case management following to find placement  7   Precautions: Routine q15min checks, vitals qshift    Franck Briceño MD  01/31/19

## 2019-01-31 NOTE — PROGRESS NOTES
[facility] Inpatient Geriatric Psychiatry  Psychiatrists  Progress Note    Patient Name: Ama Turner  MRN: [id number]  DOS: 01/31/19     Chief Complaint:  psychosis    Interval History: Patient continues to have oppositional behavior  She was initially hesitant about signing up for a new housing program opportunity but eventually agreed  She continues to have a very complex system of delusions involving people trying to spy on her, lie to her, mess with her  She states she can hear conversations several floors below us, that someone is talking about her  Medication compliant  Adverse effects of medications: denies      Mental Status Exam [Per above +]  Appearance: disheveled; no abnormal involuntary movements noted  Behavior: dominates the conversation; otherwise calm, cooperative  Speech: very talkative, pressured but not rapid, difficult to interrupt  Mood: anxious  Affect: congruent but stable, constricted  Thought process: very tangential and overinclusive  Thought content: multiple paranoid delusions  Perceptual disturbances: denies AH/VH at this time  Cognition: oriented to all domains     Insight: limited  Judgement: poor      Current Facility-Administered Medications:     acetaminophen (TYLENOL) tablet 650 mg, 650 mg, Oral, Q6H PRN, ABILIO Matute    acetaminophen (TYLENOL) tablet 650 mg, 650 mg, Oral, Q4H PRN, ABILIO Matute, 650 mg at 01/26/19 1455    acetaminophen (TYLENOL) tablet 975 mg, 975 mg, Oral, Q6H PRN, ABILIO Matute    albuterol (PROVENTIL HFA,VENTOLIN HFA) inhaler 2 puff, 2 puff, Inhalation, TID, Ramona Reed MD, 2 puff at 01/31/19 0830    aluminum-magnesium hydroxide-simethicone (MYLANTA) 200-200-20 mg/5 mL oral suspension 30 mL, 30 mL, Oral, Q4H PRN, ABILIO Matute    benztropine (COGENTIN) injection 1 mg, 1 mg, Intramuscular, BID PRN, ABILIO Matute    benztropine (COGENTIN) tablet 1 mg, 1 mg, Oral, BID PRN, Marcus Mode, CRNP    carbamide peroxide (DEBROX) 6 5 % otic solution 5 drop, 5 drop, Both Ears, BID, Maritza Marroquin MD, 5 drop at 01/31/19 0611    enoxaparin (LOVENOX) subcutaneous injection 40 mg, 40 mg, Subcutaneous, Q24H [facility], Ike Boston MD, Stopped at 01/18/19 0957    EPINEPHrine PF (ADRENALIN) 1 mg/mL injection 0 15 mg, 0 15 mg, Intramuscular, PRN, Francesco Fields, CRNP    fluticasone-vilanterol (BREO ELLIPTA) 200-25 MCG/INH inhaler 1 puff, 1 puff, Inhalation, Daily, Nando Coleman MD, 1 puff at 01/31/19 2859    hydrOXYzine HCL (ATARAX) tablet 25 mg, 25 mg, Oral, Q6H PRN, Marcus Mode, CRNP    levalbuterol (XOPENEX) inhalation solution 1 25 mg, 1 25 mg, Nebulization, Q8H PRN, 1 25 mg at 01/20/19 1325 **OR** sodium chloride 0 9 % inhalation solution 3 mL, 3 mL, Nebulization, Q8H PRN, Sukh James MD, 3 mL at 01/19/19 2303    levothyroxine tablet 125 mcg, 125 mcg, Oral, Daily, Vicky Fields, CRNP, 125 mcg at 01/31/19 0611    lithium carbonate (LITHOBID) CR tablet 300 mg, 300 mg, Oral, Daily, Deborrah Linear, CRNP, 300 mg at 01/31/19 0643    lithium carbonate (LITHOBID) CR tablet 450 mg, 450 mg, Oral, HS, Deborrah Linear, CRNP, 450 mg at 01/30/19 2214    LORazepam (ATIVAN) 2 mg/mL injection 1 mg, 1 mg, Intramuscular, Q4H PRN, Marcus Mode, CRNP    LORazepam (ATIVAN) tablet 0 5 mg, 0 5 mg, Oral, Q4H PRN, Marcus Mode, CRNP    magnesium hydroxide (MILK OF MAGNESIA) 400 mg/5 mL oral suspension 30 mL, 30 mL, Oral, Daily PRN, Marcus Mode, CRNP    nicotine (NICODERM CQ) 14 mg/24hr TD 24 hr patch 14 mg, 14 mg, Transdermal, Daily, Vicky Ciminieri, CRNP, 14 mg at 01/31/19 9167    nicotine polacrilex (NICORETTE) gum 2 mg, 2 mg, Oral, Q2H PRN, Marcus Mode, CRNP    pantoprazole (PROTONIX) EC tablet 20 mg, 20 mg, Oral, Daily, Vicky Ciminieri, CRNP, 20 mg at 01/31/19 9989    predniSONE tablet 5 mg, 5 mg, Oral, Daily, Vicky Ciminieri, CRNP, 5 mg at 01/31/19 2293   QUEtiapine (SEROquel) tablet 400 mg, 400 mg, Oral, HS, [place] Alert, CRNP, 400 mg at 01/30/19 2214    theophylline (JEF-24) 24 hr capsule 200 mg, 200 mg, Oral, Daily, Vicky Ciminieri, CRNP, 200 mg at 01/31/19 2942    traZODone (DESYREL) tablet 25 mg, 25 mg, Oral, HS PRN, Garrett Mings, CRNP    Vitals:    01/31/19 0742   BP: 144/67   Pulse: 78   Resp: 18   Temp: 97 7 °F (36 5 °C)   SpO2: 96%       Lab/work up: Li 0 8    Assessment:   Schizoaffective d/o, bipolar type  Cluster B traits    Progress: psychosis ongoing    Plan:   1  Disposition: Patient meets criteria for ongoing acute inpatient treatment due to being a danger to self 2/2 paranoid delusions and evident poor self care  Patient will be discharged when there is an absence of psychosis [id number]  2  Legal status: voluntary  3  Psychopharmacologic interventions:  - increase seroquel to 50mg po daily and 400mg po qhs for psychosis   - continue lithium 300mg po daily and 450mg po qhs for mood stabilization  - Continue to monitor psychosis  4  Medical comorbidities: Hospitalist assessment appreciated  5  Other therapies: Individual/group/milieu therapy as appropriate   6  Social issues: Case management following to find placement  7   Precautions: Routine q15min checks, vitals qshift    Xi Luis MD  01/31/19

## 2019-02-01 PROCEDURE — 99232 SBSQ HOSP IP/OBS MODERATE 35: CPT | Performed by: PSYCHIATRY & NEUROLOGY

## 2019-02-01 RX ADMIN — THEOPHYLLINE ANHYDROUS 200 MG: 200 CAPSULE, EXTENDED RELEASE ORAL at 08:20

## 2019-02-01 RX ADMIN — LEVOTHYROXINE SODIUM 125 MCG: 125 TABLET ORAL at 06:49

## 2019-02-01 RX ADMIN — LITHIUM CARBONATE EXTENDED-RELEASE TABLET 300 MG: 300 TABLET, FILM COATED, EXTENDED RELEASE ORAL at 08:20

## 2019-02-01 RX ADMIN — ALBUTEROL SULFATE 2 PUFF: 90 AEROSOL, METERED RESPIRATORY (INHALATION) at 08:20

## 2019-02-01 RX ADMIN — Medication 5 DROP: at 06:50

## 2019-02-01 RX ADMIN — ALBUTEROL SULFATE 2 PUFF: 90 AEROSOL, METERED RESPIRATORY (INHALATION) at 19:19

## 2019-02-01 RX ADMIN — Medication 5 DROP: at 21:55

## 2019-02-01 RX ADMIN — ALBUTEROL SULFATE 2 PUFF: 90 AEROSOL, METERED RESPIRATORY (INHALATION) at 14:29

## 2019-02-01 RX ADMIN — PREDNISONE 5 MG: 5 TABLET ORAL at 08:20

## 2019-02-01 RX ADMIN — PANTOPRAZOLE SODIUM 20 MG: 20 TABLET, DELAYED RELEASE ORAL at 06:49

## 2019-02-01 RX ADMIN — QUETIAPINE 400 MG: 400 TABLET, FILM COATED ORAL at 21:54

## 2019-02-01 RX ADMIN — FLUTICASONE FUROATE AND VILANTEROL TRIFENATATE 1 PUFF: 200; 25 POWDER RESPIRATORY (INHALATION) at 08:20

## 2019-02-01 RX ADMIN — LITHIUM CARBONATE 450 MG: 450 TABLET, EXTENDED RELEASE ORAL at 21:54

## 2019-02-01 RX ADMIN — NICOTINE 14 MG: 14 PATCH, EXTENDED RELEASE TRANSDERMAL at 08:19

## 2019-02-01 NOTE — PLAN OF CARE
Problem: Ineffective Coping  Goal: Participates in unit activities  Interventions:  - Provide therapeutic environment   - Provide required programming   - Redirect inappropriate behaviors    Outcome: Progressing  Pt with brighter affect socially yet continues to focus on ear issues as a reason to need assistance when ambulating

## 2019-02-01 NOTE — CASE MANAGEMENT
Left another VM for Art Pinto [phone] to get update on pt's placement at [facility]

## 2019-02-01 NOTE — PROGRESS NOTES
Pt cooperative with care and medication compliant  Visible in mileu and social with select peers and staff  Denies sx  Monitoring

## 2019-02-01 NOTE — PROGRESS NOTES
Pt calm, cooperative, and medication compliant  Pt continues to be preoccupied with having ear flushed  Visible in dayroom and attended all groups  Denies SI/HI  Will continue to monitor

## 2019-02-01 NOTE — CASE MANAGEMENT
Spoke to Joycelyn Gonzalez [phone] from [facility] [place] regarding [facility] referral  She stated that the referral was sent to [facility] and [facility] staff will need to set up a meeting/assessment with pt and then determine if she's appropriate and accepted into the program  Lucia Bowens recommended that CM reach out to Ardia Poe Apgar at [facility] to set up the assessment   Contact #: [phone]    Left  for Pallavi Sosa and sent a follow-up email

## 2019-02-01 NOTE — PROGRESS NOTES
[address] [facility] Inpatient Geriatric Psychiatry  Psychiatrists  Progress Note    Patient Name: Nina Bello  MRN: [id number]  DOS: 02/01/19     Chief Complaint:  psychosis    Interval History: Patient has been calm  No agitation but intermittently resistant with care due to patient's obsessiveness  She is rigid and hesitant with changes and has difficulty making decisions without extensive discussion and reassurance  She declined her AM seroquel today due to this - we discussed the risks and benefits, writer provided reassurance about the increased dose and eventually accepted  She did request that the seroquel be taken out of the package in front of her because she does not trust staff to administer medications correctly  She continues to hold delusions about her most recent residence and their pursuit of stealing things, making her miserable, targeting her, etc        Adverse effects of medications: denies    Mental Status Exam [Per above +]    Appearance: disheveled; fair hygiene; no abnormal involuntary movements; some increased effort in breathing  Behavior: calm, cooperative, attentive; easier to redirect today  Speech: wnl  Mood: anxious  Affect: congruent but reactive  Thought process: tangential, circumstantial, illogical at times  Thought content: paranoid delusions elicited   Perceptual disturbances: denies AH today  Cognition: oriented to all domains     Insight: limited  Judgement: poor      Current Facility-Administered Medications:     acetaminophen (TYLENOL) tablet 650 mg, 650 mg, Oral, Q6H PRN, Petra Acron, CRNP    acetaminophen (TYLENOL) tablet 650 mg, 650 mg, Oral, Q4H PRN, Petra Acron, CRNP, 650 mg at 01/26/19 1455    acetaminophen (TYLENOL) tablet 975 mg, 975 mg, Oral, Q6H PRN, Petra Acron, CRNP    albuterol (PROVENTIL HFA,VENTOLIN HFA) inhaler 2 puff, 2 puff, Inhalation, TID, Inderjit Velez MD, 2 puff at 02/01/19 4414   aluminum-magnesium hydroxide-simethicone (MYLANTA) 200-200-20 mg/5 mL oral suspension 30 mL, 30 mL, Oral, Q4H PRN, ABILIO Matute    benztropine (COGENTIN) injection 1 mg, 1 mg, Intramuscular, BID PRN, ABILIO Matute    benztropine (COGENTIN) tablet 1 mg, 1 mg, Oral, BID PRN, ABILIO Matute    carbamide peroxide (DEBROX) 6 5 % otic solution 5 drop, 5 drop, Both Ears, BID, Lala Fitzpatrick MD, 5 drop at 02/01/19 0650    EPINEPHrine PF (ADRENALIN) 1 mg/mL injection 0 15 mg, 0 15 mg, Intramuscular, PRN, ABILIO Butterfield    fluticasone-vilanterol (BREO ELLIPTA) 200-25 MCG/INH inhaler 1 puff, 1 puff, Inhalation, Daily, Rafal Malave MD, 1 puff at 02/01/19 0820    hydrOXYzine HCL (ATARAX) tablet 25 mg, 25 mg, Oral, Q6H PRN, ABILIO Matute    levalbuterol (XOPENEX) inhalation solution 1 25 mg, 1 25 mg, Nebulization, Q8H PRN, 1 25 mg at 01/20/19 1325 **OR** sodium chloride 0 9 % inhalation solution 3 mL, 3 mL, Nebulization, Q8H PRN, Ramona Reed MD, 3 mL at 01/19/19 2303    levothyroxine tablet 125 mcg, 125 mcg, Oral, Daily, VickyABILIO Toussaint, 125 mcg at 02/01/19 6646    lithium carbonate (LITHOBID) CR tablet 300 mg, 300 mg, Oral, Daily, Shelba Rashaad, CRNP, 300 mg at 02/01/19 0820    lithium carbonate (LITHOBID) CR tablet 450 mg, 450 mg, Oral, HS, Shelba Rashaad, CRNP, 450 mg at 01/31/19 2149    LORazepam (ATIVAN) 2 mg/mL injection 1 mg, 1 mg, Intramuscular, Q4H PRN, ABILIO Matute    LORazepam (ATIVAN) tablet 0 5 mg, 0 5 mg, Oral, Q4H PRN, ABILIO Matute    magnesium hydroxide (MILK OF MAGNESIA) 400 mg/5 mL oral suspension 30 mL, 30 mL, Oral, Daily PRN, ABILIO Matute    nicotine (NICODERM CQ) 14 mg/24hr TD 24 hr patch 14 mg, 14 mg, Transdermal, Daily, ABILIO Nguyen, 14 mg at 02/01/19 6917    nicotine polacrilex (NICORETTE) gum 2 mg, 2 mg, Oral, Q2H PRN, ABILIO Matute    pantoprazole (PROTONIX) EC tablet 20 mg, 20 mg, Oral, Daily, Nayeli Roe, CRNP, 20 mg at 02/01/19 2056    predniSONE tablet 5 mg, 5 mg, Oral, Daily, Vickyshailesh Fields, CRNP, 5 mg at 02/01/19 0820    QUEtiapine (SEROquel) tablet 400 mg, 400 mg, Oral, HS, Latishatony Lr CRNP, 400 mg at 01/31/19 2149    QUEtiapine (SEROquel) tablet 50 mg, 50 mg, Oral, Daily, Zia Magana MD    theophylline (JEF-24) 24 hr capsule 200 mg, 200 mg, Oral, Daily, Vicky Ciminieri, CRNP, 200 mg at 02/01/19 0820    traZODone (DESYREL) tablet 25 mg, 25 mg, Oral, HS PRN, ABILIO Osman    Vitals:    02/01/19 0700   BP: 125/76   Pulse: 74   Resp: 18   Temp: 97 7 °F (36 5 °C)   SpO2: 98%       Lab/work up: Li 0 8 on 1/29/19    Assessment:   Schizoaffective d/o, bipolar type  OCPD    Progress: psychosis ongoing    Plan:   1  Disposition: Patient meets criteria for ongoing acute inpatient treatment due to being a danger to self 2/2 paranoid delusions and evident poor self care  Patient will be discharged when there is an absence of psychosis [id number]  2  Legal status: voluntary  3  Psychopharmacologic interventions:  - reattempt seroquel titration after reassurance today - 50mg po daily and 400mg po qhs for psychosis; can increase to 100mg po daily and 400mg po qhs on Sunday if well tolerated   - continue lithium 300mg po daily and 450mg po qhs for mood stabilization (Li 0 8 on 1/29/19)  - Continue to monitor psychosis  4  Medical comorbidities: Hospitalist assessment appreciated  5  Other therapies: Individual/group/milieu therapy as appropriate   6  Social issues: Case management following to find placement  7   Precautions: Routine q15min checks, vitals qshift    Jayson Mcnulty MD  02/01/19

## 2019-02-02 PROCEDURE — 99232 SBSQ HOSP IP/OBS MODERATE 35: CPT | Performed by: PHYSICIAN ASSISTANT

## 2019-02-02 RX ADMIN — ALBUTEROL SULFATE 2 PUFF: 90 AEROSOL, METERED RESPIRATORY (INHALATION) at 13:50

## 2019-02-02 RX ADMIN — NICOTINE 14 MG: 14 PATCH, EXTENDED RELEASE TRANSDERMAL at 08:31

## 2019-02-02 RX ADMIN — THEOPHYLLINE ANHYDROUS 200 MG: 200 CAPSULE, EXTENDED RELEASE ORAL at 08:32

## 2019-02-02 RX ADMIN — LITHIUM CARBONATE EXTENDED-RELEASE TABLET 300 MG: 300 TABLET, FILM COATED, EXTENDED RELEASE ORAL at 08:32

## 2019-02-02 RX ADMIN — QUETIAPINE 50 MG: 50 TABLET ORAL at 08:32

## 2019-02-02 RX ADMIN — PANTOPRAZOLE SODIUM 20 MG: 20 TABLET, DELAYED RELEASE ORAL at 06:42

## 2019-02-02 RX ADMIN — ALBUTEROL SULFATE 2 PUFF: 90 AEROSOL, METERED RESPIRATORY (INHALATION) at 21:29

## 2019-02-02 RX ADMIN — ALBUTEROL SULFATE 2 PUFF: 90 AEROSOL, METERED RESPIRATORY (INHALATION) at 08:32

## 2019-02-02 RX ADMIN — QUETIAPINE 400 MG: 400 TABLET, FILM COATED ORAL at 21:28

## 2019-02-02 RX ADMIN — PREDNISONE 5 MG: 5 TABLET ORAL at 08:32

## 2019-02-02 RX ADMIN — LEVOTHYROXINE SODIUM 125 MCG: 125 TABLET ORAL at 06:42

## 2019-02-02 RX ADMIN — FLUTICASONE FUROATE AND VILANTEROL TRIFENATATE 1 PUFF: 200; 25 POWDER RESPIRATORY (INHALATION) at 08:32

## 2019-02-02 RX ADMIN — LITHIUM CARBONATE 450 MG: 450 TABLET, EXTENDED RELEASE ORAL at 21:28

## 2019-02-02 RX ADMIN — Medication 5 DROP: at 06:46

## 2019-02-02 NOTE — PROGRESS NOTES
Pt was calm and cooperative with care  Pt was medication compliant  Pt tried to bargain with this nurse, on when she would like to take her medications  Pt is noted to be manipulative, needy, and somatic  Pt denied all symptoms  Pt reports a BM from yesterday  Pt attended and participated in group  Pt continues on 3 L O2  Pt was out in the milieu, with minimal interaction with her peers  Will continue to monitor

## 2019-02-02 NOTE — PROGRESS NOTES
Progress Note - Behavioral Health   Efraín Sizer 66 y o  female MRN: [id number]  Unit/Bed#: [facility] 333-98 Encounter: [id number]    Patient seen, chart reviewed, discussed with staff  Nursing staff notes the patient has been compliant with her medications but is somewhat paranoid at times and requires much support  Staff notes that overall she is less somatic but does remain attention seeking  Patient is fixated today on her cerumen impaction in her ear and various other medical concerns  Reports that she becomes hypoglycemic at times and is upset that the staff would not check her glucose  The patient does not have a diagnosis of diabetes but states that she does check her glucose at home in and has never been abnormal   She is rapid and rambling in her speech and tangential in her thoughts  She tends to minimize her psychiatric issues and has limited insight into hospitalization  Continues with residual paranoid delusions regarding people stealing her things  She reports that she does have a good appetite and she has been sleeping well      Behavior over the last 24 hours:  unchanged  Sleep: normal  Appetite: normal  Medication side effects: No  ROS: Complains of cerumen impaction  /69 (BP Location: Right arm)   Pulse 74   Temp 98 5 °F (36 9 °C) (Tympanic)   Resp 18   Ht 5' 3" (1 6 m)   Wt 66 1 kg (145 lb 11 6 oz)   SpO2 96%   BMI 25 81 kg/m²     Medications:   Current Facility-Administered Medications   Medication Dose Route Frequency    acetaminophen (TYLENOL) tablet 650 mg  650 mg Oral Q6H PRN    acetaminophen (TYLENOL) tablet 650 mg  650 mg Oral Q4H PRN    acetaminophen (TYLENOL) tablet 975 mg  975 mg Oral Q6H PRN    albuterol (PROVENTIL HFA,VENTOLIN HFA) inhaler 2 puff  2 puff Inhalation TID    aluminum-magnesium hydroxide-simethicone (MYLANTA) 200-200-20 mg/5 mL oral suspension 30 mL  30 mL Oral Q4H PRN    benztropine (COGENTIN) injection 1 mg  1 mg Intramuscular BID PRN    benztropine (COGENTIN) tablet 1 mg  1 mg Oral BID PRN    carbamide peroxide (DEBROX) 6 5 % otic solution 5 drop  5 drop Both Ears BID    EPINEPHrine PF (ADRENALIN) 1 mg/mL injection 0 15 mg  0 15 mg Intramuscular PRN    fluticasone-vilanterol (BREO ELLIPTA) 200-25 MCG/INH inhaler 1 puff  1 puff Inhalation Daily    hydrOXYzine HCL (ATARAX) tablet 25 mg  25 mg Oral Q6H PRN    levalbuterol (XOPENEX) inhalation solution 1 25 mg  1 25 mg Nebulization Q8H PRN    Or    sodium chloride 0 9 % inhalation solution 3 mL  3 mL Nebulization Q8H PRN    levothyroxine tablet 125 mcg  125 mcg Oral Daily    lithium carbonate (LITHOBID) CR tablet 300 mg  300 mg Oral Daily    lithium carbonate (LITHOBID) CR tablet 450 mg  450 mg Oral HS    LORazepam (ATIVAN) 2 mg/mL injection 1 mg  1 mg Intramuscular Q4H PRN    LORazepam (ATIVAN) tablet 0 5 mg  0 5 mg Oral Q4H PRN    magnesium hydroxide (MILK OF MAGNESIA) 400 mg/5 mL oral suspension 30 mL  30 mL Oral Daily PRN    nicotine (NICODERM CQ) 14 mg/24hr TD 24 hr patch 14 mg  14 mg Transdermal Daily    nicotine polacrilex (NICORETTE) gum 2 mg  2 mg Oral Q2H PRN    pantoprazole (PROTONIX) EC tablet 20 mg  20 mg Oral Daily    predniSONE tablet 5 mg  5 mg Oral Daily    QUEtiapine (SEROquel) tablet 400 mg  400 mg Oral HS    QUEtiapine (SEROquel) tablet 50 mg  50 mg Oral Daily    theophylline (JEF-24) 24 hr capsule 200 mg  200 mg Oral Daily    traZODone (DESYREL) tablet 25 mg  25 mg Oral HS PRN       Labs:   Admission on 01/15/2019   Component Date Value Ref Range Status    Cholesterol 01/17/2019 212* <200 mg/dL Final    Triglycerides 01/17/2019 112  <150 mg/dL Final    HDL, Direct 01/17/2019 56  40 - 59 mg/dL Final    LDL Calculated 01/17/2019 134* <130 mg/dL Final    Non-HDL-Chol (CHOL-HDL) 01/17/2019 156  mg/dl Final    RPR 01/17/2019 Non-Reactive  Non-Reactive Final    Sodium 01/17/2019 139  137 - 147 mmol/L Final    Potassium 01/17/2019 4 4  3 6 - 5 0 mmol/L Final  Chloride 01/17/2019 105  97 - 108 mmol/L Final    CO2 01/17/2019 28  22 - 30 mmol/L Final    ANION GAP 01/17/2019 6  5 - 14 mmol/L Final    BUN 01/17/2019 12  5 - 25 mg/dL Final    Creatinine 01/17/2019 0 70  0 60 - 1 20 mg/dL Final    Glucose 01/17/2019 124* 70 - 99 mg/dL Final    Calcium 01/17/2019 9 4  8 4 - 10 2 mg/dL Final    AST 01/17/2019 17  14 - 36 U/L Final    ALT 01/17/2019 21  9 - 52 U/L Final    Alkaline Phosphatase 01/17/2019 89  43 - 122 U/L Final    Total Protein 01/17/2019 6 8  5 9 - 8 4 g/dL Final    Albumin 01/17/2019 3 7  3 0 - 5 2 g/dL Final    Total Bilirubin 01/17/2019 0 30  <1 30 mg/dL Final    eGFR 01/17/2019 94  >60 ml/min/1 73sq m Final    WBC 01/17/2019 9 50  4 50 - 11 00 Thousand/uL Final    RBC 01/17/2019 4 33  4 00 - 5 20 Million/uL Final    Hemoglobin 01/17/2019 13 4  12 0 - 16 0 g/dL Final    Hematocrit 01/17/2019 41 9  36 0 - 46 0 % Final    MCV 01/17/2019 97  80 - 100 fL Final    MCH 01/17/2019 31 0  26 0 - 34 0 pg Final    MCHC 01/17/2019 32 0  31 0 - 36 0 g/dL Final    RDW 01/17/2019 13 5  <15 3 % Final    MPV 01/17/2019 10 4  8 9 - 12 7 fL Final    Platelets 88/15/7587 204  150 - 450 Thousands/uL Final    Neutrophils Relative 01/17/2019 82* 45 - 65 % Final    Lymphocytes Relative 01/17/2019 12* 25 - 45 % Final    Monocytes Relative 01/17/2019 5  1 - 10 % Final    Eosinophils Relative 01/17/2019 1  0 - 6 % Final    Basophils Relative 01/17/2019 1  0 - 1 % Final    Neutrophils Absolute 01/17/2019 7 80  1 80 - 7 80 Thousands/µL Final    Lymphocytes Absolute 01/17/2019 1 20  0 50 - 4 00 Thousands/µL Final    Monocytes Absolute 01/17/2019 0 40  0 20 - 0 90 Thousand/µL Final    Eosinophils Absolute 01/17/2019 0 00  0 00 - 0 40 Thousand/µL Final    Basophils Absolute 01/17/2019 0 10  0 00 - 0 10 Thousands/µL Final    Hemoglobin A1C 01/17/2019 5 5  4 2 - 6 3 % Final    EAG 01/17/2019 111  mg/dl Final    TSH 3RD GENERATON 01/17/2019 3 280  0 465 - 4 680 uIU/mL Final    POC Glucose 01/16/2019 114* 70 - 99 mg/dl Final    Ventricular Rate 01/17/2019 74  BPM Final    Atrial Rate 01/17/2019 74  BPM Final    NH Interval 01/17/2019 160  ms Final    QRSD Interval 01/17/2019 90  ms Final    QT Interval 01/17/2019 404  ms Final    QTC Interval 01/17/2019 448  ms Final    P Axis 01/17/2019 78  degrees Final    QRS Axis 01/17/2019 -58  degrees Final    T Wave Axis 01/17/2019 54  degrees Final    Lithium Lvl 01/22/2019 0 5* 0 6 - 1 2 mmol/L Final    Lithium Lvl 01/23/2019 0 5* 0 6 - 1 2 mmol/L Final    Lithium Lvl 01/29/2019 0 8  0 6 - 1 2 mmol/L Final       Mental Status Evaluation:  Appearance:  age appropriate and disheveled   Behavior:  Cooperative, good eye contact   Speech:  pressured and tangential   Mood:  anxious   Affect:  mood-congruent   Thought Process:  tangential   Thought Content:  delusions  persecutory   Perceptual Disturbances: Denies auditory or visual hallucinations   Risk Potential: Suicidal Ideations none, Homicidal Ideations none and Potential for Aggression No   Sensorium:  person, place and time/date   Cognition:  grossly intact   Consciousness:  alert and awake    Attention: attention span appeared shorter than expected for age   Insight:  Poor   Judgment: limited   Gait/Station: normal gait/station   Motor Activity: no abnormal movements     Progress Toward Goals:  Minimal change    Assessment/Plan   Principal Problem:    Schizoaffective disorder, bipolar type (HCC)  Active Problems:    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[address] )    Tobacco use disorder, continuous    Chronic respiratory failure (HCC)    Acquired hypothyroidism    Gastroesophageal reflux disease without esophagitis    Abnormal CT of the chest    Excessive cerumen in left ear canal    Lipoma of right upper extremity      Recommended Treatment:   Patient on Debrox for cerumen  Continue with Lithobid as ordered, lithium level on January 29, 2019 was 0 8  Continue Seroquel 50 mg in the morning 400 at HS and continue to encourage medication compliance    Continue with group therapy, milieu therapy and occupational therapy  Risks, benefits and possible side effects of Medications:   Risks, benefits, and possible side effects of medications explained to patient and patient verbalizes understanding  Counseling / Coordination of Care  Total floor / unit time spent today 25 minutes  Greater than 50% of total time was spent with the patient and / or family counseling and / or coordination of care  A description of the counseling / coordination of care:  Medication management, chart review, patient interview

## 2019-02-02 NOTE — PROGRESS NOTES
Patient present in milieu  Socializing with staff and peers  Medication and meal compliant  Continues with 3L O2 via nasal cannula  Denies SI/HI  Will continue to monitor

## 2019-02-03 PROCEDURE — 99232 SBSQ HOSP IP/OBS MODERATE 35: CPT | Performed by: PSYCHIATRY & NEUROLOGY

## 2019-02-03 RX ADMIN — ALBUTEROL SULFATE 2 PUFF: 90 AEROSOL, METERED RESPIRATORY (INHALATION) at 21:32

## 2019-02-03 RX ADMIN — FLUTICASONE FUROATE AND VILANTEROL TRIFENATATE 1 PUFF: 200; 25 POWDER RESPIRATORY (INHALATION) at 08:41

## 2019-02-03 RX ADMIN — ALBUTEROL SULFATE 2 PUFF: 90 AEROSOL, METERED RESPIRATORY (INHALATION) at 08:41

## 2019-02-03 RX ADMIN — Medication 5 DROP: at 06:20

## 2019-02-03 RX ADMIN — LITHIUM CARBONATE EXTENDED-RELEASE TABLET 300 MG: 300 TABLET, FILM COATED, EXTENDED RELEASE ORAL at 08:40

## 2019-02-03 RX ADMIN — LITHIUM CARBONATE 450 MG: 450 TABLET, EXTENDED RELEASE ORAL at 21:32

## 2019-02-03 RX ADMIN — PREDNISONE 5 MG: 5 TABLET ORAL at 08:40

## 2019-02-03 RX ADMIN — NICOTINE 14 MG: 14 PATCH, EXTENDED RELEASE TRANSDERMAL at 08:40

## 2019-02-03 RX ADMIN — LEVOTHYROXINE SODIUM 125 MCG: 125 TABLET ORAL at 06:20

## 2019-02-03 RX ADMIN — ALBUTEROL SULFATE 2 PUFF: 90 AEROSOL, METERED RESPIRATORY (INHALATION) at 13:37

## 2019-02-03 RX ADMIN — QUETIAPINE 400 MG: 400 TABLET, FILM COATED ORAL at 21:32

## 2019-02-03 RX ADMIN — QUETIAPINE 50 MG: 50 TABLET ORAL at 08:40

## 2019-02-03 RX ADMIN — PANTOPRAZOLE SODIUM 20 MG: 20 TABLET, DELAYED RELEASE ORAL at 06:20

## 2019-02-03 RX ADMIN — THEOPHYLLINE ANHYDROUS 200 MG: 200 CAPSULE, EXTENDED RELEASE ORAL at 08:40

## 2019-02-03 NOTE — PROGRESS NOTES
C/O"has slept very well last night I am did feeling much better i e  In a good good state of her mind PE    Report from staff regarding this patient received and record reviewed  prior to seeing this patient   Behavior over the last 24 hours:  Up patient as a too attached to her not her nose and sitting and states that she slept very well feels good today and seems very fresh and rested, reports he is taking how her Medication, did not offer any issues, she is being treated was schizoaffective disorder  Appetite:  Okay  Medication side effects:  Denied  ROS:  Improved  Mental Status Evaluation:  Appearance:  Dressed appropraitely   Behavior:  cooperative   Speech:  normal   Mood:  Euthymic, smiles appropriately   Affect:  appropriate  b   Thought Process:  Goal directed   Thought Content:  normal   Perceptual Disturbances: Denied AV hallucination   Risk Potential: NO GIBSON    Sensorium:  normal   Cognition:  intact   Consciousness:  Alert, OX3   Attention: Fair   Insight:  fair   Judgment: fair   Gait/Station: With in normal range   Motor Activity: With in normal range     Progress Toward Goals: working on current treatment goals, no changes  Made in treatment plan   Recommended Treatment: Continue with group therapy, milieu therapy and occupational therapy  Risks, benefits and possible side effects of Medications:   Risks, benefits, and possible side effects of medications explained to patient and patient verbalizes understanding        Medications:   current meds:   Current Facility-Administered Medications   Medication Dose Route Frequency    acetaminophen (TYLENOL) tablet 650 mg  650 mg Oral Q6H PRN    acetaminophen (TYLENOL) tablet 650 mg  650 mg Oral Q4H PRN    acetaminophen (TYLENOL) tablet 975 mg  975 mg Oral Q6H PRN    albuterol (PROVENTIL HFA,VENTOLIN HFA) inhaler 2 puff  2 puff Inhalation TID    aluminum-magnesium hydroxide-simethicone (MYLANTA) 200-200-20 mg/5 mL oral suspension 30 mL  30 mL Oral Q4H PRN    benztropine (COGENTIN) injection 1 mg  1 mg Intramuscular BID PRN    benztropine (COGENTIN) tablet 1 mg  1 mg Oral BID PRN    carbamide peroxide (DEBROX) 6 5 % otic solution 5 drop  5 drop Both Ears BID    EPINEPHrine PF (ADRENALIN) 1 mg/mL injection 0 15 mg  0 15 mg Intramuscular PRN    fluticasone-vilanterol (BREO ELLIPTA) 200-25 MCG/INH inhaler 1 puff  1 puff Inhalation Daily    hydrOXYzine HCL (ATARAX) tablet 25 mg  25 mg Oral Q6H PRN    levalbuterol (XOPENEX) inhalation solution 1 25 mg  1 25 mg Nebulization Q8H PRN    Or    sodium chloride 0 9 % inhalation solution 3 mL  3 mL Nebulization Q8H PRN    levothyroxine tablet 125 mcg  125 mcg Oral Daily    lithium carbonate (LITHOBID) CR tablet 300 mg  300 mg Oral Daily    lithium carbonate (LITHOBID) CR tablet 450 mg  450 mg Oral HS    LORazepam (ATIVAN) 2 mg/mL injection 1 mg  1 mg Intramuscular Q4H PRN    LORazepam (ATIVAN) tablet 0 5 mg  0 5 mg Oral Q4H PRN    magnesium hydroxide (MILK OF MAGNESIA) 400 mg/5 mL oral suspension 30 mL  30 mL Oral Daily PRN    nicotine (NICODERM CQ) 14 mg/24hr TD 24 hr patch 14 mg  14 mg Transdermal Daily    nicotine polacrilex (NICORETTE) gum 2 mg  2 mg Oral Q2H PRN    pantoprazole (PROTONIX) EC tablet 20 mg  20 mg Oral Daily    predniSONE tablet 5 mg  5 mg Oral Daily    QUEtiapine (SEROquel) tablet 400 mg  400 mg Oral HS    QUEtiapine (SEROquel) tablet 50 mg  50 mg Oral Daily    theophylline (JEF-24) 24 hr capsule 200 mg  200 mg Oral Daily    traZODone (DESYREL) tablet 25 mg  25 mg Oral HS PRN     Labs: NA    Assessment, Diagnosis  and Plan: continue with current meds and goals, seems like she is doing much better today   F/U tomorrow with the treatment    Counseling / Coordination of Care  Total floor / unit time spent today20 minutes  minutes  Greater than 50% of total time was spent with the patient and / or family counseling and / or coordination of care   A description of the counseling / coordination of care: , follow up with the treatment team tomorrow    Duyen Roth MD

## 2019-02-03 NOTE — PROGRESS NOTES
Pt calm, cooperative  Less somatic this evening  Currently denies all s/s  Sat in dayroom this evening coloring and socializing with peers  Medication compliant

## 2019-02-03 NOTE — PROGRESS NOTES
Pt was calm and cooperative with care  Pt was medication compliant  Pt denies all symptoms  Pt stated "I had a much better day yesterday  I think that Seroquel stuff is working for me"  Pt reports her last BM was 2 days ago  Pt was out in the milieu, with minimal interaction with her peers  Will continue to monitor

## 2019-02-04 PROCEDURE — 99232 SBSQ HOSP IP/OBS MODERATE 35: CPT | Performed by: NURSE PRACTITIONER

## 2019-02-04 RX ADMIN — FLUTICASONE FUROATE AND VILANTEROL TRIFENATATE 1 PUFF: 200; 25 POWDER RESPIRATORY (INHALATION) at 08:35

## 2019-02-04 RX ADMIN — LEVOTHYROXINE SODIUM 125 MCG: 125 TABLET ORAL at 06:04

## 2019-02-04 RX ADMIN — Medication 5 DROP: at 06:05

## 2019-02-04 RX ADMIN — NICOTINE 14 MG: 14 PATCH, EXTENDED RELEASE TRANSDERMAL at 08:37

## 2019-02-04 RX ADMIN — THEOPHYLLINE ANHYDROUS 200 MG: 200 CAPSULE, EXTENDED RELEASE ORAL at 08:38

## 2019-02-04 RX ADMIN — LITHIUM CARBONATE 450 MG: 450 TABLET, EXTENDED RELEASE ORAL at 22:28

## 2019-02-04 RX ADMIN — ALBUTEROL SULFATE 2 PUFF: 90 AEROSOL, METERED RESPIRATORY (INHALATION) at 08:40

## 2019-02-04 RX ADMIN — PREDNISONE 5 MG: 5 TABLET ORAL at 08:38

## 2019-02-04 RX ADMIN — QUETIAPINE 50 MG: 50 TABLET ORAL at 09:10

## 2019-02-04 RX ADMIN — LITHIUM CARBONATE EXTENDED-RELEASE TABLET 300 MG: 300 TABLET, FILM COATED, EXTENDED RELEASE ORAL at 08:38

## 2019-02-04 RX ADMIN — Medication 5 DROP: at 22:26

## 2019-02-04 RX ADMIN — ALBUTEROL SULFATE 2 PUFF: 90 AEROSOL, METERED RESPIRATORY (INHALATION) at 19:54

## 2019-02-04 RX ADMIN — QUETIAPINE FUMARATE 450 MG: 50 TABLET, FILM COATED ORAL at 22:27

## 2019-02-04 RX ADMIN — ALBUTEROL SULFATE 2 PUFF: 90 AEROSOL, METERED RESPIRATORY (INHALATION) at 13:45

## 2019-02-04 RX ADMIN — PANTOPRAZOLE SODIUM 20 MG: 20 TABLET, DELAYED RELEASE ORAL at 06:04

## 2019-02-04 NOTE — CASE MANAGEMENT
Email from Burr Glenn Apgar @[facility] stating that she will be able to come assess pt the week of the    requested that she come earlier if possible since pt is reaching her baseline and is running out of covered days for inpatient tx

## 2019-02-04 NOTE — PROGRESS NOTES
Pt less somatic this evening  No c/o about her ears  Denies all s/s currently  Out in milieu all evening coloring and socializing with peers  Medication compliant

## 2019-02-04 NOTE — PLAN OF CARE
Problem: Ineffective Coping  Goal: Participates in unit activities  Interventions:  - Provide therapeutic environment   - Provide required programming   - Redirect inappropriate behaviors    Outcome: Progressing  Pt more spontaneously receptive to structured group involvement  Inspite of being encouraged to move at her own pace,she complained that the seated exercise pace was too fast for her  Overall has been primarily cooperative and social in the dayroom

## 2019-02-04 NOTE — PROGRESS NOTES
Progress Note - [id number] [address] [place] 66 y o  female MRN: [id number]   Unit/Bed#: Stephon [facility]borne 196-59 Encounter: [id number]    Behavior over the last 24 hours: unchanged  Agustin Edgar was seen today for continuation of care, records reviewed, and patient was discussed in morning case reviewed team   Agustin Edgar is very guarded and paranoid upon approach  She reports I only want to speak with my psychiatrist because I am the most complex case  "The patient is extremely grandiose and demanding today  Patient remains somatic complaining of ear impaction  Patient declines answering majority of interview questions and would not join the team for specific questioning  Per staff reports patient is attending groups and interacting appropriately  She is taking her medications as prescribed and sleeping throughout the night  She is eating between 75 and 100% of her meals  Per staff reports she denies suicidal and homicidal ideation and she denies auditory and visual hallucinations she also told staff that she believes the Seroquel is working for her  Patient is compliant with wearing her O2      Sleep: normal  Appetite: normal  Medication side effects: No   ROS: reports Ear pain and generalized somatic complaints    Mental Status Evaluation:    Appearance:  disheveled   Behavior:  bizarre, demanding, guarded, uncooperative   Speech:  normal rate, normal volume, normal pitch   Mood:  anxious, labile   Affect:  constricted, mood-congruent   Thought Process:  circumstantial   Associations: concrete associations   Thought Content:  paranoid ideation, grandiose ideas, negative thinking   Perceptual Disturbances: does not appear responding to internal stimuli   Risk Potential: Suicidal ideation - does not answer  Homicidal ideation - does not answer  Potential for aggression - No   Sensorium:  does not answer   Memory:  patient does not answer   Consciousness:  alert and awake   Attention: attention span and concentration appear shorter than expected for age   Insight:  poor   Judgment: poor   Gait/Station: slow gait   Motor Activity: no abnormal movements     Vital signs in last 24 hours:    Temp:  [97 4 °F (36 3 °C)-98 8 °F (37 1 °C)] 97 4 °F (36 3 °C)  HR:  [81-97] 81  Resp:  [16-18] 16  BP: (113-118)/(55-69) 116/55    Laboratory results:    I have personally reviewed all pertinent laboratory/tests results  Most Recent Labs:   Lab Results   Component Value Date    WBC 9 50 01/17/2019    RBC 4 33 01/17/2019    HGB 13 4 01/17/2019    HCT 41 9 01/17/2019     01/17/2019    RDW 13 5 01/17/2019    NEUTROABS 7 80 01/17/2019    SODIUM 139 01/17/2019    K 4 4 01/17/2019     01/17/2019    CO2 28 01/17/2019    BUN 12 01/17/2019    CREATININE 0 70 01/17/2019    GLUC 124 (H) 01/17/2019    CALCIUM 9 4 01/17/2019    AST 17 01/17/2019    ALT 21 01/17/2019    ALKPHOS 89 01/17/2019    TP 6 8 01/17/2019    ALB 3 7 01/17/2019    TBILI 0 30 01/17/2019    CHOLESTEROL 212 (H) 01/17/2019    HDL 56 01/17/2019    TRIG 112 01/17/2019    LDLCALC 134 (H) 01/17/2019    [place] 156 01/17/2019    LITHIUM 0 8 01/29/2019    AMMONIA 13 02/22/2016    JUY3WXMXBTYN 3 280 01/17/2019    FREET4 1 4 05/12/2015    RPR Non-Reactive 01/17/2019    HGBA1C 5 5 01/17/2019     01/17/2019       Progress Toward Goals: no significant improvement    Assessment/Plan   Principal Problem:    Schizoaffective disorder, bipolar type ([facility] [address] )  Active Problems:    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HCC)    Tobacco use disorder, continuous    Chronic respiratory failure (HCC)    Acquired hypothyroidism    Gastroesophageal reflux disease without esophagitis    Abnormal CT of the chest    Excessive cerumen in left ear canal    Lipoma of right upper extremity    Recommended Treatment:     Planned medication and treatment changes: All current active medications have been reviewed    Encourage group therapy, milieu therapy and occupational therapy  Behavioral Health checks every 7 minutes   Continue O2 therapy for SLIM orders  Increase evening dose of Seroquel from 400 mg po Q HS to 450 mg PO Q HS (Continue 50 mg PO at 10 AM)  Continue current medications:    Current Facility-Administered Medications:  acetaminophen 650 mg Oral Q6H PRN Ernesto Rings, CRNP   acetaminophen 650 mg Oral Q4H PRN Ernesto Rings, CRNP   acetaminophen 975 mg Oral Q6H PRN Ernesto Rings, CRNP   albuterol 2 puff Inhalation TID Ashley Tripathi MD   aluminum-magnesium hydroxide-simethicone 30 mL Oral Q4H PRN Ernesto Rings, CRNP   benztropine 1 mg Intramuscular BID PRN Ernesto Rings, CRNP   benztropine 1 mg Oral BID PRN Ernesto Rings, CRNP   carbamide peroxide 5 drop Both Ears BID Lesley Pond MD   EPINEPHrine PF 0 15 mg Intramuscular PRN Jennifer Macadamia, CRNP   fluticasone-vilanterol 1 puff Inhalation Daily Wang Pitts MD   hydrOXYzine HCL 25 mg Oral Q6H PRN Ernesto Rings, CRNP   levalbuterol 1 25 mg Nebulization Q8H PRN Ashley Tripathi MD   Or       sodium chloride 3 mL Nebulization Q8H PRN Ashley Tripathi MD   levothyroxine 125 mcg Oral Daily Vicky Ciminieri, CRNP   lithium carbonate 300 mg Oral Daily Caar Arnie, CRNP   lithium carbonate 450 mg Oral HS Carataylor Gaitan, CRNP   LORazepam 1 mg Intramuscular Q4H PRN Ernesto Rings, CRNP   LORazepam 0 5 mg Oral Q4H PRN Ernesto Rings, CRNP   magnesium hydroxide 30 mL Oral Daily PRN Ernesto Rings, CRNP   nicotine 14 mg Transdermal Daily Jennifer Macadamia, CRNP   nicotine polacrilex 2 mg Oral Q2H PRN Ernesto Rings, CRNP   pantoprazole 20 mg Oral Daily Vicky Ciminieri, CRNP   predniSONE 5 mg Oral Daily Vicky Ciminieri, CRNP   QUEtiapine 450 mg Oral HS Kanika Banks, CRNP   QUEtiapine 50 mg Oral Daily Colette Gray MD   theophylline 200 mg Oral Daily Jennifer Macadamia, CRNP   traZODone 25 mg Oral HS PRN Ernesto Rings, CRNP       Risks / Benefits of Treatment:    Risks, benefits, and possible side effects of medications explained to patient   Patient has limited understanding of risks and benefits of treatment at this time, but agrees to take medications as prescribed  Counseling / Coordination of Care:    Patient's progress discussed with staff in treatment team meeting  Medications, treatment progress and treatment plan reviewed with patient  Supportive counseling provided to the patient

## 2019-02-04 NOTE — PROGRESS NOTES
Pt is calm, cooperative with care and medication compliant  Pt reports sleeping well and denies all s/s including SI and HI  Pt is social with peers and visible on the unit  Pt comes out for meals and groups  Pt is currently sitting in the dayroom with peers  Will continue to monitor

## 2019-02-05 PROCEDURE — 99232 SBSQ HOSP IP/OBS MODERATE 35: CPT | Performed by: NURSE PRACTITIONER

## 2019-02-05 RX ADMIN — QUETIAPINE 50 MG: 50 TABLET ORAL at 09:33

## 2019-02-05 RX ADMIN — PANTOPRAZOLE SODIUM 20 MG: 20 TABLET, DELAYED RELEASE ORAL at 06:36

## 2019-02-05 RX ADMIN — LITHIUM CARBONATE 450 MG: 450 TABLET, EXTENDED RELEASE ORAL at 22:38

## 2019-02-05 RX ADMIN — Medication 5 DROP: at 22:37

## 2019-02-05 RX ADMIN — ALBUTEROL SULFATE 2 PUFF: 90 AEROSOL, METERED RESPIRATORY (INHALATION) at 21:00

## 2019-02-05 RX ADMIN — Medication 5 DROP: at 06:36

## 2019-02-05 RX ADMIN — FLUTICASONE FUROATE AND VILANTEROL TRIFENATATE 1 PUFF: 200; 25 POWDER RESPIRATORY (INHALATION) at 09:33

## 2019-02-05 RX ADMIN — LITHIUM CARBONATE EXTENDED-RELEASE TABLET 300 MG: 300 TABLET, FILM COATED, EXTENDED RELEASE ORAL at 09:33

## 2019-02-05 RX ADMIN — THEOPHYLLINE ANHYDROUS 200 MG: 200 CAPSULE, EXTENDED RELEASE ORAL at 09:33

## 2019-02-05 RX ADMIN — QUETIAPINE FUMARATE 450 MG: 50 TABLET, FILM COATED ORAL at 22:38

## 2019-02-05 RX ADMIN — LEVOTHYROXINE SODIUM 125 MCG: 125 TABLET ORAL at 06:36

## 2019-02-05 RX ADMIN — PREDNISONE 5 MG: 5 TABLET ORAL at 09:33

## 2019-02-05 RX ADMIN — ALBUTEROL SULFATE 2 PUFF: 90 AEROSOL, METERED RESPIRATORY (INHALATION) at 09:37

## 2019-02-05 RX ADMIN — NICOTINE 14 MG: 14 PATCH, EXTENDED RELEASE TRANSDERMAL at 09:36

## 2019-02-05 RX ADMIN — ALBUTEROL SULFATE 2 PUFF: 90 AEROSOL, METERED RESPIRATORY (INHALATION) at 15:26

## 2019-02-05 NOTE — PROGRESS NOTES
Pt compliant with treatment and medication regime  Pt remains somatic, regarding ears and balance  Pt denies s/s, including SI and HI  Pt visible in the milieu and interacts appropriately with peers  Will continue to monitor

## 2019-02-05 NOTE — PROGRESS NOTES
Progress Note - [id number] Enid Andrade [place] 66 y o  female MRN: [id number]   Unit/Bed#: Moon Clark 052-49 Encounter: [id number]    Behavior over the last 24 hours: unchanged  Niki Henderson was seen today for continuation of care, records reviewed and patient was discussed in morning case review team   Patient is much more cooperative today for interview though she remains very somatic, grandiose and paranoid  Niki Henderson reports auditory hallucinations of Dr Dang Najera and Dr Indigo [place] having conversations with each other about my care, they are sticking up for me like parents and having conversations with my  and my sister  The doctor is going to have my  put up the house for lease and sell his camera equipment so he can take better care of me        Patient reports having several panic attacks and increased anxiety due to agoraphobia and having sensations that she is floating out the window when lying in bed at night  Patient denies visual hallucinations or tactile hallucinations  Patient denies SI and HI    Patient reports her depression is an 8/10, 10 being the worst     Sleep: normal  Appetite: normal  Medication side effects: No   ROS: reports back pain and Right ear fullness, denies any headache, shortness of breath, chest pain or abdominal pain (patient is using O2)    Mental Status Evaluation:    Appearance:  disheveled   Behavior:  bizarre, demanding, somewhat cooperative, slightly guarded   Speech:  normal rate, normal volume, normal pitch   Mood:  somewhat anxious, somewhat depressed   Affect:  constricted, mood-congruent   Thought Process:  illogical, tangential   Associations: tangential associations   Thought Content:  grandiose, paranoid and bizarre delusions, paranoid ideation, grandiose, negative thinking   Perceptual Disturbances: no visual hallucinations, auditory hallucinations without commands and of "people talking", does not appear responding to internal stimuli   Risk Potential: Suicidal ideation - None  Homicidal ideation - None  Potential for aggression - No   Sensorium:  oriented to person, place and year   Memory:  Difficult to assess due to patient factors   Consciousness:  alert and awake   Attention: attention span and concentration appear shorter than expected for age   Insight:  poor   Judgment: poor   Gait/Station: normal gait/station and Ambulates using O2 tanks   Motor Activity: no abnormal movements     Vital signs in last 24 hours:    Temp:  [98 7 °F (37 1 °C)-99 1 °F (37 3 °C)] 98 7 °F (37 1 °C)  HR:  [] 64  Resp:  [18] 18  BP: (104-115)/(59-70) 104/59    Laboratory results:  No new labs to review today    Progress Toward Goals: minimal improvement    Assessment/Plan   Principal Problem:    Schizoaffective disorder, bipolar type ([facility])  Active Problems:    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[facility] [address] )    Tobacco use disorder, continuous    Chronic respiratory failure ([facility])    Acquired hypothyroidism    Gastroesophageal reflux disease without esophagitis    Abnormal CT of the chest    Excessive cerumen in left ear canal    Lipoma of right upper extremity    Recommended Treatment:     Planned medication and treatment changes: All current active medications have been reviewed    Encourage group therapy, milieu therapy and occupational therapy  Behavioral Health checks every 7 minutes  Continue O2  Check Lithium level, CBC/diff and CMP in the morning  Continue current medications:    Current Facility-Administered Medications:  acetaminophen 650 mg Oral Q6H PRN Anson Muck, CRNP   acetaminophen 650 mg Oral Q4H PRN Anson Muck, CRNP   acetaminophen 975 mg Oral Q6H PRN Anson Muck, CRNP   albuterol 2 puff Inhalation TID Jamaal Galan MD   aluminum-magnesium hydroxide-simethicone 30 mL Oral Q4H PRN Anson Muck, CRNP   benztropine 1 mg Intramuscular BID PRN Anson Muck, CRNP   benztropine 1 mg Oral BID PRN Anson Muck, CRNP   carbamide peroxide 5 drop Both Ears BID Keyona Coe Bebo Thomas MD   EPINEPHrine PF 0 15 mg Intramuscular PRN Cecelia Coxeri, CRNP   fluticasone-vilanterol 1 puff Inhalation Daily Gabriel Nixon MD   hydrOXYzine HCL 25 mg Oral Q6H PRN Noreene Banco, CRNP   levalbuterol 1 25 mg Nebulization Q8H PRN Sarah Sims MD   Or       sodium chloride 3 mL Nebulization Q8H PRN Sarah Sims MD   levothyroxine 125 mcg Oral Daily Vicky Ciminieri, CRNP   lithium carbonate 300 mg Oral Daily Sheralyn Ends, CRNP   lithium carbonate 450 mg Oral HS Sheralyn Ends, CRNP   LORazepam 1 mg Intramuscular Q4H PRN Noreene Banco, CRNP   LORazepam 0 5 mg Oral Q4H PRN Noreene Banco, CRNP   magnesium hydroxide 30 mL Oral Daily PRN Noreene Banco, CRNP   nicotine 14 mg Transdermal Daily Shani Maninder, CRNP   nicotine polacrilex 2 mg Oral Q2H PRN Noreene Banco, CRNP   pantoprazole 20 mg Oral Daily Vicky Ciminieri, CRNP   predniSONE 5 mg Oral Daily Vicky Ciminieri, CRNP   QUEtiapine 450 mg Oral HS Calvin Johns, CRNP   QUEtiapine 50 mg Oral Daily Yoni Whiteside MD   theophylline 200 mg Oral Daily Shani Maninder, CRNP   traZODone 25 mg Oral HS PRN Noreene Banco, CRNP       Risks / Benefits of Treatment:    Risks, benefits, and possible side effects of medications explained to patient and patient verbalizes understanding and agreement for treatment  Counseling / Coordination of Care:    Patient's progress discussed with staff in treatment team meeting  Medications, treatment progress and treatment plan reviewed with patient  Supportive counseling provided to the patient

## 2019-02-05 NOTE — CASE MANAGEMENT
Emailed Judd Feathers Apgar from [facility] to see if she's able to come assess pt this week to help expedite the placement process   CM will follow up

## 2019-02-05 NOTE — PROGRESS NOTES
Pt is cooperative with care and is medication compliant  Pt is present in the dayroom, no somatic complaints  Pt is social with peers, out for meals and groups  Denies s/s at this time  Will monitor

## 2019-02-05 NOTE — PLAN OF CARE
Problem: Ineffective Coping  Goal: Participates in unit activities  Interventions:  - Provide therapeutic environment   - Provide required programming   - Redirect inappropriate behaviors    Outcome: Progressing  Pt with bright affect and appropriate focus in all groups today   She did continue to ask for drops for her ear and when requesting to have her environment brought closer to her,she was receptive to moving her chair to take responsibility for a sight limitation  Pt reported having fun in groups now and demonstrates flexibility in her expectations to get along smoother with the group

## 2019-02-06 PROCEDURE — 99232 SBSQ HOSP IP/OBS MODERATE 35: CPT | Performed by: PHYSICIAN ASSISTANT

## 2019-02-06 RX ADMIN — NICOTINE 14 MG: 14 PATCH, EXTENDED RELEASE TRANSDERMAL at 08:59

## 2019-02-06 RX ADMIN — QUETIAPINE FUMARATE 450 MG: 50 TABLET, FILM COATED ORAL at 22:17

## 2019-02-06 RX ADMIN — ALBUTEROL SULFATE 2 PUFF: 90 AEROSOL, METERED RESPIRATORY (INHALATION) at 14:12

## 2019-02-06 RX ADMIN — PANTOPRAZOLE SODIUM 20 MG: 20 TABLET, DELAYED RELEASE ORAL at 06:12

## 2019-02-06 RX ADMIN — Medication 5 DROP: at 06:12

## 2019-02-06 RX ADMIN — LITHIUM CARBONATE 450 MG: 450 TABLET, EXTENDED RELEASE ORAL at 22:17

## 2019-02-06 RX ADMIN — ALBUTEROL SULFATE 2 PUFF: 90 AEROSOL, METERED RESPIRATORY (INHALATION) at 08:53

## 2019-02-06 RX ADMIN — FLUTICASONE FUROATE AND VILANTEROL TRIFENATATE 1 PUFF: 200; 25 POWDER RESPIRATORY (INHALATION) at 08:58

## 2019-02-06 RX ADMIN — PREDNISONE 5 MG: 5 TABLET ORAL at 08:55

## 2019-02-06 RX ADMIN — ALBUTEROL SULFATE 2 PUFF: 90 AEROSOL, METERED RESPIRATORY (INHALATION) at 20:00

## 2019-02-06 RX ADMIN — LEVOTHYROXINE SODIUM 125 MCG: 125 TABLET ORAL at 06:12

## 2019-02-06 RX ADMIN — THEOPHYLLINE ANHYDROUS 200 MG: 200 CAPSULE, EXTENDED RELEASE ORAL at 08:54

## 2019-02-06 RX ADMIN — QUETIAPINE 50 MG: 50 TABLET ORAL at 08:54

## 2019-02-06 RX ADMIN — LITHIUM CARBONATE EXTENDED-RELEASE TABLET 300 MG: 300 TABLET, FILM COATED, EXTENDED RELEASE ORAL at 08:55

## 2019-02-06 NOTE — CASE MANAGEMENT
CM followed up with Jessica Apgar from [facility] regarding assessment day/time  Requested that she come asap since this is a time sensitive case  She stated that she will come assess on  at 9:30am  Her schedule doesn't allow her to come any earlier  CM will continue checking in with her

## 2019-02-06 NOTE — PROGRESS NOTES
Patient calm, cooperative and compliant with medications  She is present in the milieu for breakfast  She said she is still waking up but denies all symptoms currently  She says ear drops are making her ear worse and informed me a NP told her that they would flush them  Notified SLIM   Patient refused shower during day shift

## 2019-02-06 NOTE — PROGRESS NOTES
Pt stated while she was using bathroom across from nurses station she banger her RFA on the door  Small ecchymotic area approx  2 inches with a small tissue tear noted  Cleansed and left CHRISTOPHER  Monitoring

## 2019-02-06 NOTE — CASE MANAGEMENT
Spoke to pt's sister, Alvarado Krishnan [phone] to give update on pt's tx and placement  CM stated that we're awaiting placement at [facility] and once the assessment is completed on 2/14, a definitive d/c date will be determined  Alvarado Krishnan also stated that she has some of pt's belongings (purse, portable oxygenator, some laundry) but a lot was left behind at [facility]  Alvarado Krishnan stated that pt would have her oxygen delivered to her from a particular company and they'll need to be notified prior to pt's move so she has oxygen at the home when she arrives  She uses a large oxygenator at night for sleeping and a portable on during the day  CM asked for the company's info -- she will need to look for it and call CM back

## 2019-02-06 NOTE — PROGRESS NOTES
Progress Note - Behavioral Health   Roselia Woody 66 y o  female MRN: [id number]  Unit/Bed#: Dinora Patel 246-70 Encounter: [id number]    Assessment/Plan   Principal Problem:    Schizoaffective disorder, bipolar type ([facility] )  Active Problems:    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[facility] )    Tobacco use disorder, continuous    Chronic respiratory failure ([facility] )    Acquired hypothyroidism    Gastroesophageal reflux disease without esophagitis    Abnormal CT of the chest    Excessive cerumen in left ear canal    Lipoma of right upper extremity      Subjective:  Pt was reviewed today during case team meeting  Per staff, pt has been pleasant and cooperative and compliant with medications  Pt has been attending groups and interacting with peers  Pt has been eating all meals and slept through the night  Pt is somatic and complaining of right ear pain and fullness and stating the ear drops are not helping  Patient also admits to increased depression and anxiety today due to her O2 tank "possibly exploding" and having to ask people to help her to the bathroom  Patient was reassured that her O2 tank is secured and  it would not explode in the bathroom  She is also complaining of excessive flatulence and bloating, but denies any diarrhea  Patient does admit to continued auditory hallucinations  She would not state who she thought was speaking, however, she says people are talking about her outside of her room  She states, it is good things about my care " Patient denies any visual hallucinations, paranoia, or SI or HI  Patient does admit to sleeping through the night        Current Medications:  Current Facility-Administered Medications   Medication Dose Route Frequency    acetaminophen (TYLENOL) tablet 650 mg  650 mg Oral Q6H PRN    acetaminophen (TYLENOL) tablet 650 mg  650 mg Oral Q4H PRN    acetaminophen (TYLENOL) tablet 975 mg  975 mg Oral Q6H PRN    albuterol (PROVENTIL HFA,VENTOLIN HFA) inhaler 2 puff  2 puff Inhalation TID    aluminum-magnesium hydroxide-simethicone (MYLANTA) 200-200-20 mg/5 mL oral suspension 30 mL  30 mL Oral Q4H PRN    benztropine (COGENTIN) injection 1 mg  1 mg Intramuscular BID PRN    benztropine (COGENTIN) tablet 1 mg  1 mg Oral BID PRN    carbamide peroxide (DEBROX) 6 5 % otic solution 5 drop  5 drop Both Ears BID    EPINEPHrine PF (ADRENALIN) 1 mg/mL injection 0 15 mg  0 15 mg Intramuscular PRN    fluticasone-vilanterol (BREO ELLIPTA) 200-25 MCG/INH inhaler 1 puff  1 puff Inhalation Daily    hydrOXYzine HCL (ATARAX) tablet 25 mg  25 mg Oral Q6H PRN    levalbuterol (XOPENEX) inhalation solution 1 25 mg  1 25 mg Nebulization Q8H PRN    Or    sodium chloride 0 9 % inhalation solution 3 mL  3 mL Nebulization Q8H PRN    levothyroxine tablet 125 mcg  125 mcg Oral Daily    lithium carbonate (LITHOBID) CR tablet 300 mg  300 mg Oral Daily    lithium carbonate (LITHOBID) CR tablet 450 mg  450 mg Oral HS    LORazepam (ATIVAN) 2 mg/mL injection 1 mg  1 mg Intramuscular Q4H PRN    LORazepam (ATIVAN) tablet 0 5 mg  0 5 mg Oral Q4H PRN    magnesium hydroxide (MILK OF MAGNESIA) 400 mg/5 mL oral suspension 30 mL  30 mL Oral Daily PRN    nicotine (NICODERM CQ) 14 mg/24hr TD 24 hr patch 14 mg  14 mg Transdermal Daily    nicotine polacrilex (NICORETTE) gum 2 mg  2 mg Oral Q2H PRN    pantoprazole (PROTONIX) EC tablet 20 mg  20 mg Oral Daily    predniSONE tablet 5 mg  5 mg Oral Daily    QUEtiapine (SEROquel) tablet 450 mg  450 mg Oral HS    QUEtiapine (SEROquel) tablet 50 mg  50 mg Oral Daily    theophylline (JEF-24) 24 hr capsule 200 mg  200 mg Oral Daily    traZODone (DESYREL) tablet 25 mg  25 mg Oral HS PRN       Behavioral Health Medications: all current active meds have been reviewed  Vitals:  Vitals:    02/06/19 0733   BP: 110/55   Pulse: 72   Resp: 16   Temp: 97 8 °F (36 6 °C)   SpO2: 98%       Laboratory results:  I have personally reviewed all pertinent laboratory/tests results    No new labs to review  Psychiatric Review of Systems:  Behavior over the last 24 hours:  unchanged  Sleep: normal  Appetite: normal  Medication side effects: No  ROS: flatulence and bloating, R ear pain    Mental Status Evaluation:  Appearance:  disheveled   Behavior:  guarded and calm, pleasant   Speech:  normal pitch, normal volume and tangential   Mood:  anxious and depressed   Affect:  Anxious   Language Normal   Thought Process:  tangential   Thought Content:  obsessions and auditory hallucinations without direction   Perceptual Disturbances: Auditory hallucinations without commands   Risk Potential: Denies SI and HI   Sensorium:  Not formally assessed today   Cognition:  impaired due to delusions and paranoia   Consciousness:  alert and awake    Recent and Remote Memory Impaired due to delusions and paranoia   Attention: attention span appeared shorter than expected for age   Insight:  poor   Judgment: poor   Gait/Station: slow and Walks with O2 tank   Motor Activity: no abnormal movements     Progress Toward Goals:  Unchanged    Recommended Treatment: Continue with group therapy, milieu therapy and occupational therapy  1  Continue all medications as prescribed  2  Representative from [facility] will be coming for assessment for placement on 2/14/2019    Risks, benefits and possible side effects of Medications:   Risks, benefits, and possible side effects of medications explained to patient and patient verbalizes understanding

## 2019-02-07 PROCEDURE — 99231 SBSQ HOSP IP/OBS SF/LOW 25: CPT | Performed by: PHYSICIAN ASSISTANT

## 2019-02-07 RX ADMIN — PREDNISONE 5 MG: 5 TABLET ORAL at 08:33

## 2019-02-07 RX ADMIN — NICOTINE 14 MG: 14 PATCH, EXTENDED RELEASE TRANSDERMAL at 08:33

## 2019-02-07 RX ADMIN — ALBUTEROL SULFATE 2 PUFF: 90 AEROSOL, METERED RESPIRATORY (INHALATION) at 08:32

## 2019-02-07 RX ADMIN — FLUTICASONE FUROATE AND VILANTEROL TRIFENATATE 1 PUFF: 200; 25 POWDER RESPIRATORY (INHALATION) at 08:32

## 2019-02-07 RX ADMIN — LITHIUM CARBONATE 450 MG: 450 TABLET, EXTENDED RELEASE ORAL at 21:40

## 2019-02-07 RX ADMIN — ALBUTEROL SULFATE 2 PUFF: 90 AEROSOL, METERED RESPIRATORY (INHALATION) at 19:47

## 2019-02-07 RX ADMIN — PANTOPRAZOLE SODIUM 20 MG: 20 TABLET, DELAYED RELEASE ORAL at 06:19

## 2019-02-07 RX ADMIN — ALBUTEROL SULFATE 2 PUFF: 90 AEROSOL, METERED RESPIRATORY (INHALATION) at 14:20

## 2019-02-07 RX ADMIN — THEOPHYLLINE ANHYDROUS 200 MG: 200 CAPSULE, EXTENDED RELEASE ORAL at 08:33

## 2019-02-07 RX ADMIN — LEVOTHYROXINE SODIUM 125 MCG: 125 TABLET ORAL at 06:19

## 2019-02-07 RX ADMIN — QUETIAPINE 50 MG: 50 TABLET ORAL at 08:33

## 2019-02-07 RX ADMIN — LITHIUM CARBONATE EXTENDED-RELEASE TABLET 300 MG: 300 TABLET, FILM COATED, EXTENDED RELEASE ORAL at 08:33

## 2019-02-07 RX ADMIN — QUETIAPINE FUMARATE 450 MG: 50 TABLET, FILM COATED ORAL at 21:40

## 2019-02-07 NOTE — PLAN OF CARE
Alteration in Thoughts and Perception     Treatment Goal: Gain control of psychotic behaviors/thinking, reduce/eliminate presenting symptoms and demonstrate improved reality functioning upon discharge Progressing

## 2019-02-07 NOTE — PROGRESS NOTES
Progress Note - Behavioral Health   Roselia Woody 66 y o  female MRN: [id number]  Unit/Bed#: [facility] Patel 647-40 Encounter: [id number]    Assessment/Plan   Principal Problem:    Schizoaffective disorder, bipolar type ([facility] 75 )  Active Problems:    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[facility] 75 )    Tobacco use disorder, continuous    Chronic respiratory failure ([facility] 75 )    Acquired hypothyroidism    Gastroesophageal reflux disease without esophagitis    Abnormal CT of the chest    Excessive cerumen in left ear canal    Lipoma of right upper extremity      Subjective: Pt was seen today and case was reviewed by team   Per staff, pt has been pleasant, cooperative, and medication compliant  Pt socializes appropriately with peers and is participating appropriately in groups  Pt was seen today in the conference room  She states she is depressed and anxious today, but less so than yesterday due to her R ear pain/fullness which is unchanged since her arrival on the unit and taking a shower with her O2 tank  Pt states she thinks if she showers (which she refused today), her ear will be worse, and she is nervous her O2 tank will explode in the bathroom  Pt was reassured her O2 tank is safe and will not explode in the shower or run out  She is also concerned about her sister taking her identity and finances as well as withholding her O2 concentrator  Pt admits to auditory hallucinations of doctors talking outside her room last night about her good health and also trying to figure out her finances to receive her oxygen when she is discharged  Pt denies visual hallucinations, paranoia, SI/HI, change in appetite, sleep      Current Medications:  Current Facility-Administered Medications   Medication Dose Route Frequency    acetaminophen (TYLENOL) tablet 650 mg  650 mg Oral Q6H PRN    acetaminophen (TYLENOL) tablet 650 mg  650 mg Oral Q4H PRN    acetaminophen (TYLENOL) tablet 975 mg  975 mg Oral Q6H PRN    albuterol (PROVENTIL HFA,VENTOLIN HFA) inhaler 2 puff  2 puff Inhalation TID    aluminum-magnesium hydroxide-simethicone (MYLANTA) 200-200-20 mg/5 mL oral suspension 30 mL  30 mL Oral Q4H PRN    benztropine (COGENTIN) injection 1 mg  1 mg Intramuscular BID PRN    benztropine (COGENTIN) tablet 1 mg  1 mg Oral BID PRN    EPINEPHrine PF (ADRENALIN) 1 mg/mL injection 0 15 mg  0 15 mg Intramuscular PRN    fluticasone-vilanterol (BREO ELLIPTA) 200-25 MCG/INH inhaler 1 puff  1 puff Inhalation Daily    hydrOXYzine HCL (ATARAX) tablet 25 mg  25 mg Oral Q6H PRN    levalbuterol (XOPENEX) inhalation solution 1 25 mg  1 25 mg Nebulization Q8H PRN    Or    sodium chloride 0 9 % inhalation solution 3 mL  3 mL Nebulization Q8H PRN    levothyroxine tablet 125 mcg  125 mcg Oral Daily    lithium carbonate (LITHOBID) CR tablet 300 mg  300 mg Oral Daily    lithium carbonate (LITHOBID) CR tablet 450 mg  450 mg Oral HS    LORazepam (ATIVAN) 2 mg/mL injection 1 mg  1 mg Intramuscular Q4H PRN    LORazepam (ATIVAN) tablet 0 5 mg  0 5 mg Oral Q4H PRN    magnesium hydroxide (MILK OF MAGNESIA) 400 mg/5 mL oral suspension 30 mL  30 mL Oral Daily PRN    nicotine (NICODERM CQ) 14 mg/24hr TD 24 hr patch 14 mg  14 mg Transdermal Daily    nicotine polacrilex (NICORETTE) gum 2 mg  2 mg Oral Q2H PRN    pantoprazole (PROTONIX) EC tablet 20 mg  20 mg Oral Daily    predniSONE tablet 5 mg  5 mg Oral Daily    QUEtiapine (SEROquel) tablet 450 mg  450 mg Oral HS    QUEtiapine (SEROquel) tablet 50 mg  50 mg Oral Daily    theophylline (JEF-24) 24 hr capsule 200 mg  200 mg Oral Daily    traZODone (DESYREL) tablet 25 mg  25 mg Oral HS PRN       Behavioral Health Medications: all current active meds have been reviewed  Vitals:  Vitals:    02/07/19 0726   BP: 113/57   Pulse: 75   Resp: 16   Temp: 97 7 °F (36 5 °C)   SpO2: 98%       Laboratory results:  I have personally reviewed all pertinent laboratory/tests results  No new labs to review   Pending results for labs in the AM tomorrow  Psychiatric Review of Systems:  Behavior over the last 24 hours:  unchanged  Sleep: normal  Appetite: normal  Medication side effects: No  ROS: R ear pain    Mental Status Evaluation:  Appearance:  disheveled and in hospital clothing   Behavior:  guarded and calm, cooperative, grandiose   Speech:  normal pitch, normal volume and tangential   Mood:  anxious and depressed   Affect:  normal and anxious   Language Normal   Thought Process:  goal directed, perserverative and grandiose obsessions   Thought Content:  delusions  persecutory and obsessions, and somatic delusions   Perceptual Disturbances: Auditory hallucinations without commands   Risk Potential: Denies SI/HI   Sensorium:  person and place   Cognition:  impaired due to delusions   Consciousness:  alert and awake    Recent and Remote Memory Not formally assessed today   Attention: attention span appeared shorter than expected for age   Insight:  poor   Judgment: poor   Gait/Station: slow and walks steady with O2 tank   Motor Activity: no abnormal movements     Progress Toward Goals: Unchanged    Recommended Treatment: Continue with group therapy, milieu therapy and occupational therapy  1  Continue all medications as prescribed  2  Ordered AM labs (Lithium level, CBC w/ diff, CMP) for tomorrow for recheck levels  3  Encourage showering    Risks, benefits and possible side effects of Medications:   Risks, benefits, and possible side effects of medications explained to patient and patient verbalizes understanding

## 2019-02-07 NOTE — PROGRESS NOTES
Awake, alert, oriented  Pleasant and cooperative on approach at this time  Medication compliant  Pt with no complaints at this time  Currently resting calmly in dining room interacting appropriately with staff and peers  Will continue to monitor

## 2019-02-07 NOTE — DISCHARGE INSTR - OTHER ORDERS
You are being discharged to: [facility], [address], [place]     Triggers you have identified during your hospitalization that led to your distressed mood include: unhappiness with previous housing  If you are unable to deal with your distressed mood alone, please contact your ACT team though [facility] ACT  [facility] Crisis Intervention: [phone]  *National Suicide Prevention Lifeline:  5-499.124.8433  *Peer Support Talk Line (Seven days a week, 1:00 PM  9:00 PM) Call: 987.714.9032 or Text: [phone]  *Vladimir on [address] [facility] ([facility]) HELPLINE: [phone]/Website: www bolivar org  *Substance Abuse and [address] [facility]) [employer], which is a confidential, free, 24-hour-a-day, 365-day-a-year, information service for individuals and family members facing mental health and/or substance use disorders  This service provides referrals to local treatment facilities, support groups, and community-based organizations  Callers can also order free publications and other information  Call 0-373.637.9335/Website: www [facility] gov  *[employer] 2-1-1: This is a toll free, confidential, 24-hour-a-day service which connects you to a community  in your area who can help you find services and resources that are available to you locally and provide critical services that can improve and save lives    Call: 211  /Website: [email or web address]/

## 2019-02-07 NOTE — NURSING NOTE
Patient is pleasant and cooperative with the care and medication  Present in the milieu all afternoon  Will continue to monitor

## 2019-02-08 PROCEDURE — 99231 SBSQ HOSP IP/OBS SF/LOW 25: CPT | Performed by: PSYCHIATRY & NEUROLOGY

## 2019-02-08 RX ADMIN — QUETIAPINE FUMARATE 450 MG: 50 TABLET, FILM COATED ORAL at 22:09

## 2019-02-08 RX ADMIN — LEVOTHYROXINE SODIUM 125 MCG: 125 TABLET ORAL at 06:33

## 2019-02-08 RX ADMIN — FLUTICASONE FUROATE AND VILANTEROL TRIFENATATE 1 PUFF: 200; 25 POWDER RESPIRATORY (INHALATION) at 08:50

## 2019-02-08 RX ADMIN — ALBUTEROL SULFATE 2 PUFF: 90 AEROSOL, METERED RESPIRATORY (INHALATION) at 19:28

## 2019-02-08 RX ADMIN — NICOTINE 14 MG: 14 PATCH, EXTENDED RELEASE TRANSDERMAL at 08:45

## 2019-02-08 RX ADMIN — PREDNISONE 5 MG: 5 TABLET ORAL at 08:47

## 2019-02-08 RX ADMIN — THEOPHYLLINE ANHYDROUS 200 MG: 200 CAPSULE, EXTENDED RELEASE ORAL at 08:47

## 2019-02-08 RX ADMIN — LITHIUM CARBONATE EXTENDED-RELEASE TABLET 300 MG: 300 TABLET, FILM COATED, EXTENDED RELEASE ORAL at 09:40

## 2019-02-08 RX ADMIN — QUETIAPINE 50 MG: 50 TABLET ORAL at 08:47

## 2019-02-08 RX ADMIN — ALBUTEROL SULFATE 2 PUFF: 90 AEROSOL, METERED RESPIRATORY (INHALATION) at 08:47

## 2019-02-08 RX ADMIN — ALBUTEROL SULFATE 2 PUFF: 90 AEROSOL, METERED RESPIRATORY (INHALATION) at 13:55

## 2019-02-08 RX ADMIN — PANTOPRAZOLE SODIUM 20 MG: 20 TABLET, DELAYED RELEASE ORAL at 06:30

## 2019-02-08 RX ADMIN — LITHIUM CARBONATE 450 MG: 450 TABLET, EXTENDED RELEASE ORAL at 22:10

## 2019-02-08 NOTE — PLAN OF CARE
Problem: Ineffective Coping  Goal: Participates in unit activities  Interventions:  - Provide therapeutic environment   - Provide required programming   - Redirect inappropriate behaviors    Outcome: Progressing  Pt attended all groups throughout the day needing to go to the bathroom briefly as each group began  Pt displayed some attention seeking behaviors of contradicting task instructions and needing extra time to complete assigned group projects  She was more receptive to redirection when beginning to blame her family for past negative behaviors  More social,joining the group tables with min  prompting

## 2019-02-08 NOTE — PROGRESS NOTES
Progress Note - Behavioral Health   Arvil Model 66 y o  female MRN: [id number]  Unit/Bed#: Stephon Osborne 942-86 Encounter: [id number]    Assessment/Plan   Principal Problem:    Schizoaffective disorder, bipolar type ([facility] 75 )  Active Problems:    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[facility] 75 )    Tobacco use disorder, continuous    Chronic respiratory failure ([facility] 75 )    Acquired hypothyroidism    Gastroesophageal reflux disease without esophagitis    Abnormal CT of the chest    Excessive cerumen in left ear canal    Lipoma of right upper extremity      Subjective: Pt was seen today and reviewed during team case review  Per staff, pt has been cooperative, attending groups with participation, and interacting in the milieu  Pt denied BW this morning, but has been medication compliant  Pt did shower yesterday afternoon with minimal assistance  Pt reports she is anxious today and describes it as an "7/7" with 10 being the worst anxiety  Pt reports this is due to a pt she was socializing with being discharged yesterday and she is also c/o chronic R ear pain  Pt states last night she felt her ear pain was worse and admits to tinnitus which only lasted a few minutes  Pt admits to continued auditory hallucinations, but they were diminished last night due to tinnitus and pt could not overhear the doctors speaking outside her room  Admits to flatulence, but admits to formed BMs  Pt denies SI/HI, visual hallucinations, paranoia, depression, change in appetite, change in sleep      Current Medications:  Current Facility-Administered Medications   Medication Dose Route Frequency    acetaminophen (TYLENOL) tablet 650 mg  650 mg Oral Q6H PRN    acetaminophen (TYLENOL) tablet 650 mg  650 mg Oral Q4H PRN    acetaminophen (TYLENOL) tablet 975 mg  975 mg Oral Q6H PRN    albuterol (PROVENTIL HFA,VENTOLIN HFA) inhaler 2 puff  2 puff Inhalation TID    aluminum-magnesium hydroxide-simethicone (MYLANTA) 200-200-20 mg/5 mL oral suspension 30 mL  30 mL Oral Q4H PRN    benztropine (COGENTIN) injection 1 mg  1 mg Intramuscular BID PRN    benztropine (COGENTIN) tablet 1 mg  1 mg Oral BID PRN    EPINEPHrine PF (ADRENALIN) 1 mg/mL injection 0 15 mg  0 15 mg Intramuscular PRN    fluticasone-vilanterol (BREO ELLIPTA) 200-25 MCG/INH inhaler 1 puff  1 puff Inhalation Daily    hydrOXYzine HCL (ATARAX) tablet 25 mg  25 mg Oral Q6H PRN    levalbuterol (XOPENEX) inhalation solution 1 25 mg  1 25 mg Nebulization Q8H PRN    Or    sodium chloride 0 9 % inhalation solution 3 mL  3 mL Nebulization Q8H PRN    levothyroxine tablet 125 mcg  125 mcg Oral Daily    lithium carbonate (LITHOBID) CR tablet 300 mg  300 mg Oral Daily    lithium carbonate (LITHOBID) CR tablet 450 mg  450 mg Oral HS    LORazepam (ATIVAN) 2 mg/mL injection 1 mg  1 mg Intramuscular Q4H PRN    LORazepam (ATIVAN) tablet 0 5 mg  0 5 mg Oral Q4H PRN    magnesium hydroxide (MILK OF MAGNESIA) 400 mg/5 mL oral suspension 30 mL  30 mL Oral Daily PRN    nicotine (NICODERM CQ) 14 mg/24hr TD 24 hr patch 14 mg  14 mg Transdermal Daily    nicotine polacrilex (NICORETTE) gum 2 mg  2 mg Oral Q2H PRN    pantoprazole (PROTONIX) EC tablet 20 mg  20 mg Oral Daily    predniSONE tablet 5 mg  5 mg Oral Daily    QUEtiapine (SEROquel) tablet 450 mg  450 mg Oral HS    QUEtiapine (SEROquel) tablet 50 mg  50 mg Oral Daily    theophylline (JEF-24) 24 hr capsule 200 mg  200 mg Oral Daily    traZODone (DESYREL) tablet 25 mg  25 mg Oral HS PRN       Behavioral Health Medications: all current active meds have been reviewed  Vitals:  Vitals:    02/08/19 0716   BP: 112/58   Pulse: 72   Resp: 18   Temp: 97 5 °F (36 4 °C)   SpO2: 98%       Laboratory results:  I have personally reviewed all pertinent laboratory/tests results    Pt denied AM labs today    Psychiatric Review of Systems:  Behavior over the last 24 hours:  unchanged  Sleep: normal  Appetite: normal  Medication side effects: No  ROS: R ear pain (chronic), flatulence    Mental Status Evaluation:  Appearance:  disheveled   Behavior:  calm, cooperative, pleasant   Speech:  pressured and tangential   Mood:  anxious   Affect:  mood-congruent   Language normal   Thought Process:  tangential   Thought Content:  obsessions   Perceptual Disturbances: Auditory hallucinations without commands   Risk Potential: Denies SI/HI   Sensorium:  person, place and time/date   Cognition:  impaired due to hallucinations, obsessions   Consciousness:  alert and awake    Recent and Remote Memory Remote memory intact   Attention: attention span appeared shorter than expected for age   Insight:  poor   Judgment: poor   Gait/Station: slow and uses O2 tank for balance   Motor Activity: no abnormal movements     Progress Toward Goals: Unchanged    Recommended Treatment:   1  Continue with group therapy, milieu therapy and occupational therapy  2  Continue all medications as prescribed  3  Give Mylanta PRN for flatulence  4  Meeting with [facility] on 2/14/19 for review and placement  5  Call [facility] for ear flushing due to long length of stay on unit  6  Reordered lab work for AM (Lithium level, CMP, CBC w/ diff); pt refused today "taking too long"     Risks, benefits and possible side effects of Medications:   Risks, benefits, and possible side effects of medications explained to patient and patient verbalizes understanding

## 2019-02-08 NOTE — PROGRESS NOTES
Pt is calm, cooperative with care and medication compliant  Pt denies all s/s including SI and HI stating " I feel good today"  During attempt for blood work pt became upset that it was taking too long to find an applicable site and blood work was unable to be done  Pt is visible on the unit and social with peers  Pt comes out for meals and groups  Pt is currently sitting in group  Will continue to monitor

## 2019-02-08 NOTE — CASE MANAGEMENT
Spoke with pt briefly regarding placement and stated that [facility] rep will be coming out to meet with her on 2/14 at 9:30am unless she's able to make it earlier next week  Pt seemed excited for this meeting and was stated that she's looking forward to it

## 2019-02-08 NOTE — PROGRESS NOTES
Pt observed interacting with peers in the milieu  Pt  appeared calm and cooperative today, continues on 3L O2  Pt denies SOB and s/s  Pt medication compliant

## 2019-02-09 LAB
ALBUMIN SERPL BCP-MCNC: 3.7 G/DL (ref 3–5.2)
ALP SERPL-CCNC: 89 U/L (ref 43–122)
ALT SERPL W P-5'-P-CCNC: 18 U/L (ref 9–52)
ANION GAP SERPL CALCULATED.3IONS-SCNC: 7 MMOL/L (ref 5–14)
AST SERPL W P-5'-P-CCNC: 17 U/L (ref 14–36)
BASOPHILS # BLD AUTO: 0.1 THOUSANDS/ΜL (ref 0–0.1)
BASOPHILS NFR BLD AUTO: 1 % (ref 0–1)
BILIRUB SERPL-MCNC: 0.5 MG/DL
BUN SERPL-MCNC: 11 MG/DL (ref 5–25)
CALCIUM SERPL-MCNC: 9.4 MG/DL (ref 8.4–10.2)
CHLORIDE SERPL-SCNC: 104 MMOL/L (ref 97–108)
CO2 SERPL-SCNC: 27 MMOL/L (ref 22–30)
CREAT SERPL-MCNC: 0.69 MG/DL (ref 0.6–1.2)
EOSINOPHIL # BLD AUTO: 0.2 THOUSAND/ΜL (ref 0–0.4)
EOSINOPHIL NFR BLD AUTO: 2 % (ref 0–6)
ERYTHROCYTE [DISTWIDTH] IN BLOOD BY AUTOMATED COUNT: 14.2 %
GFR SERPL CREATININE-BSD FRML MDRD: 94 ML/MIN/1.73SQ M
GLUCOSE SERPL-MCNC: 94 MG/DL (ref 70–99)
HCT VFR BLD AUTO: 39.9 % (ref 36–46)
HGB BLD-MCNC: 12.9 G/DL (ref 12–16)
LITHIUM SERPL-SCNC: 0.9 MMOL/L (ref 0.6–1.2)
LYMPHOCYTES # BLD AUTO: 2.8 THOUSANDS/ΜL (ref 0.5–4)
LYMPHOCYTES NFR BLD AUTO: 27 % (ref 25–45)
MCH RBC QN AUTO: 31.4 PG (ref 26–34)
MCHC RBC AUTO-ENTMCNC: 32.4 G/DL (ref 31–36)
MCV RBC AUTO: 97 FL (ref 80–100)
MONOCYTES # BLD AUTO: 0.9 THOUSAND/ΜL (ref 0.2–0.9)
MONOCYTES NFR BLD AUTO: 9 % (ref 1–10)
NEUTROPHILS # BLD AUTO: 6.2 THOUSANDS/ΜL (ref 1.8–7.8)
NEUTS SEG NFR BLD AUTO: 61 % (ref 45–65)
PLATELET # BLD AUTO: 237 THOUSANDS/UL (ref 150–450)
PMV BLD AUTO: 9.7 FL (ref 8.9–12.7)
POTASSIUM SERPL-SCNC: 4 MMOL/L (ref 3.6–5)
PROT SERPL-MCNC: 6.2 G/DL (ref 5.9–8.4)
RBC # BLD AUTO: 4.12 MILLION/UL (ref 4–5.2)
SODIUM SERPL-SCNC: 138 MMOL/L (ref 137–147)
WBC # BLD AUTO: 10.2 THOUSAND/UL (ref 4.5–11)

## 2019-02-09 PROCEDURE — 85025 COMPLETE CBC W/AUTO DIFF WBC: CPT | Performed by: FAMILY MEDICINE

## 2019-02-09 PROCEDURE — 80178 ASSAY OF LITHIUM: CPT | Performed by: FAMILY MEDICINE

## 2019-02-09 PROCEDURE — 80053 COMPREHEN METABOLIC PANEL: CPT | Performed by: FAMILY MEDICINE

## 2019-02-09 PROCEDURE — 99232 SBSQ HOSP IP/OBS MODERATE 35: CPT | Performed by: PSYCHIATRY & NEUROLOGY

## 2019-02-09 RX ADMIN — PANTOPRAZOLE SODIUM 20 MG: 20 TABLET, DELAYED RELEASE ORAL at 06:28

## 2019-02-09 RX ADMIN — LITHIUM CARBONATE EXTENDED-RELEASE TABLET 300 MG: 300 TABLET, FILM COATED, EXTENDED RELEASE ORAL at 10:08

## 2019-02-09 RX ADMIN — QUETIAPINE FUMARATE 450 MG: 50 TABLET, FILM COATED ORAL at 21:28

## 2019-02-09 RX ADMIN — ALBUTEROL SULFATE 2 PUFF: 90 AEROSOL, METERED RESPIRATORY (INHALATION) at 19:41

## 2019-02-09 RX ADMIN — LITHIUM CARBONATE 450 MG: 450 TABLET, EXTENDED RELEASE ORAL at 21:29

## 2019-02-09 RX ADMIN — QUETIAPINE 50 MG: 50 TABLET ORAL at 09:11

## 2019-02-09 RX ADMIN — ALBUTEROL SULFATE 2 PUFF: 90 AEROSOL, METERED RESPIRATORY (INHALATION) at 09:10

## 2019-02-09 RX ADMIN — FLUTICASONE FUROATE AND VILANTEROL TRIFENATATE 1 PUFF: 200; 25 POWDER RESPIRATORY (INHALATION) at 09:10

## 2019-02-09 RX ADMIN — THEOPHYLLINE ANHYDROUS 200 MG: 200 CAPSULE, EXTENDED RELEASE ORAL at 09:11

## 2019-02-09 RX ADMIN — PREDNISONE 5 MG: 5 TABLET ORAL at 09:11

## 2019-02-09 RX ADMIN — LEVOTHYROXINE SODIUM 125 MCG: 125 TABLET ORAL at 06:28

## 2019-02-09 RX ADMIN — NICOTINE 14 MG: 14 PATCH, EXTENDED RELEASE TRANSDERMAL at 09:13

## 2019-02-09 RX ADMIN — ALBUTEROL SULFATE 2 PUFF: 90 AEROSOL, METERED RESPIRATORY (INHALATION) at 13:57

## 2019-02-09 NOTE — PROGRESS NOTES
Pt advised feeling off balance today as her ear pressure has built up  Pt is requesting that she has her ear flushed again

## 2019-02-09 NOTE — PROGRESS NOTES
Patient visible in milieu, interacting with staff and peers  Compliant with medication regime  Continues with 3L O2 via nasal cannula  Denies s/s at this time  Will continue to monitor

## 2019-02-09 NOTE — PROGRESS NOTES
Pt is cooperative with care and is medication compliant  Pt tolerated blood work at this time  Pt is out for meals and is social with peers and staff  Pt continues on 3L of oxygen at this time  Denies s/s at this time  Will continue to monitor

## 2019-02-09 NOTE — PROGRESS NOTES
Progress Note - Behavioral Health   Cathy Arroyo 66 y o  female MRN: [id number]  Unit/Bed#: Stephanie Patient 905-15 Encounter: [id number]    The patient was seen for continuing care and reviewed with treatment team She remains fixated on wax in her ears causing equilibrium problems  Also talked about her conflicts with her sister  Mental Status Evaluation:  Appearance:  Good eye contact and disheveled   Behavior:  calm and cooperative   Mood:  anxious   Thought Content:  Does not verbalize delusional material   Perceptual Disturbances: Denies hallucinations and does not appear to be responding to internal stimuli   Risk Potential: No suicidal or homicidal ideation   Orientation:            Assessment/Plan    Principal Problem:    Schizoaffective disorder, bipolar type ([facility] )  Active Problems:    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[facility] )    Tobacco use disorder, continuous    Chronic respiratory failure ([facility] )    Acquired hypothyroidism    Gastroesophageal reflux disease without esophagitis    Abnormal CT of the chest    Excessive cerumen in left ear canal    Lipoma of right upper extremity      Recommended Treatment: Lithium level  Is pending  Continue with pharmacotherapy, group therapy, milieu therapy and occupational therapy    The patient will be maintained on the following medications:    Current Facility-Administered Medications:  acetaminophen 650 mg Oral Q6H PRN Patricio Alosa, CRNP   acetaminophen 650 mg Oral Q4H PRN Patricio Alosa, CRNP   acetaminophen 975 mg Oral Q6H PRN Patricio Alosa, CRNP   albuterol 2 puff Inhalation TID Duran Esteves MD   aluminum-magnesium hydroxide-simethicone 30 mL Oral Q4H PRN Patricio Alosa, CRNP   benztropine 1 mg Intramuscular BID PRN Patricio Alosa, CRNP   benztropine 1 mg Oral BID PRN Patricio Alosa, CRNP   EPINEPHrine PF 0 15 mg Intramuscular PRN Vicky Ciminieri, CRNP   fluticasone-vilanterol 1 puff Inhalation Daily Jb Frausto MD   hydrOXYzine HCL 25 mg Oral Q6H PRN Patricio Alosa, CRNP   levalbuterol 1 25 mg Nebulization Q8H PRN Mary Lou Snell MD   Or       sodium chloride 3 mL Nebulization Q8H PRN Mary Lou Snell MD   levothyroxine 125 mcg Oral Daily Vicky Ciminieri, CRNP   lithium carbonate 300 mg Oral Daily Misty Rock, CRNP   lithium carbonate 450 mg Oral HS Misty Wilkerson, CRNP   LORazepam 1 mg Intramuscular Q4H PRN Jose L Leahy, CRNP   LORazepam 0 5 mg Oral Q4H PRN Jose L Leahy, CRNP   magnesium hydroxide 30 mL Oral Daily PRN Jose L Leahy, CRNP   nicotine 14 mg Transdermal Daily Vicky Ciminieri, CRNP   nicotine polacrilex 2 mg Oral Q2H PRN Jose L Leahy, CRNP   pantoprazole 20 mg Oral Daily Vicky Ciminieri, CRNP   predniSONE 5 mg Oral Daily Vicky Ciminieri, CRNP   QUEtiapine 450 mg Oral HS Mandi Vogt, CRNP   QUEtiapine 50 mg Oral Daily Octaviano Elizabeth MD   theophylline 200 mg Oral Daily Ashkan Martell, CRNP   traZODone 25 mg Oral HS PRN Jose L Leahy, CRNP

## 2019-02-10 PROCEDURE — 99232 SBSQ HOSP IP/OBS MODERATE 35: CPT | Performed by: PSYCHIATRY & NEUROLOGY

## 2019-02-10 RX ADMIN — NICOTINE 14 MG: 14 PATCH, EXTENDED RELEASE TRANSDERMAL at 08:56

## 2019-02-10 RX ADMIN — LITHIUM CARBONATE EXTENDED-RELEASE TABLET 300 MG: 300 TABLET, FILM COATED, EXTENDED RELEASE ORAL at 08:53

## 2019-02-10 RX ADMIN — ALBUTEROL SULFATE 2 PUFF: 90 AEROSOL, METERED RESPIRATORY (INHALATION) at 14:00

## 2019-02-10 RX ADMIN — PREDNISONE 5 MG: 5 TABLET ORAL at 08:53

## 2019-02-10 RX ADMIN — PANTOPRAZOLE SODIUM 20 MG: 20 TABLET, DELAYED RELEASE ORAL at 06:20

## 2019-02-10 RX ADMIN — QUETIAPINE FUMARATE 450 MG: 50 TABLET, FILM COATED ORAL at 22:00

## 2019-02-10 RX ADMIN — ALBUTEROL SULFATE 2 PUFF: 90 AEROSOL, METERED RESPIRATORY (INHALATION) at 21:24

## 2019-02-10 RX ADMIN — FLUTICASONE FUROATE AND VILANTEROL TRIFENATATE 1 PUFF: 200; 25 POWDER RESPIRATORY (INHALATION) at 08:52

## 2019-02-10 RX ADMIN — ALBUTEROL SULFATE 2 PUFF: 90 AEROSOL, METERED RESPIRATORY (INHALATION) at 08:52

## 2019-02-10 RX ADMIN — LEVOTHYROXINE SODIUM 125 MCG: 125 TABLET ORAL at 06:20

## 2019-02-10 RX ADMIN — LITHIUM CARBONATE 450 MG: 450 TABLET, EXTENDED RELEASE ORAL at 22:00

## 2019-02-10 RX ADMIN — QUETIAPINE 50 MG: 50 TABLET ORAL at 08:53

## 2019-02-10 RX ADMIN — THEOPHYLLINE ANHYDROUS 200 MG: 200 CAPSULE, EXTENDED RELEASE ORAL at 08:53

## 2019-02-10 NOTE — PROGRESS NOTES
Pt had an uneventful evening  Noted to be OOB among peers  Will verbalize needs but still displays some level of paranoia regarding availability of things such as albuterol inhaler  Pt will question staff several times on such matters even after thorough explanation has been given  Otherwise she remains medication compliant  Continues on 2L O2  Will monitor

## 2019-02-10 NOTE — PROGRESS NOTES
Progress Note - Behavioral Health   Brandon Yang 66 y o  female MRN: [id number]  Unit/Bed#: Dennise Dumas 987-17 Encounter: [id number]    The patient was seen for continuing care and reviewed with treatment team   This irritable  She has been sleeping and eating well  Lithium level from yesterday is 0 9 which is optimal    Mental Status Evaluation:  Appearance:  Adequate hygiene and grooming and Good eye contact   Behavior:  calm and cooperative   Mood:  improving   Thought Content:  Does not verbalize delusional material   Perceptual Disturbances: Denies hallucinations and does not appear to be responding to internal stimuli   Risk Potential: No suicidal or homicidal ideation   Orientation:            Assessment/Plan    Principal Problem:    Schizoaffective disorder, bipolar type ([facility] )  Active Problems:    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[facility] )    Tobacco use disorder, continuous    Chronic respiratory failure ([facility] )    Acquired hypothyroidism    Gastroesophageal reflux disease without esophagitis    Abnormal CT of the chest    Excessive cerumen in left ear canal    Lipoma of right upper extremity      Recommended Treatment: Continue with pharmacotherapy, group therapy, milieu therapy and occupational therapy    The patient will be maintained on the following medications:    Current Facility-Administered Medications:  acetaminophen 650 mg Oral Q6H PRN Omega Beery, CRNP   acetaminophen 650 mg Oral Q4H PRN Omega Beery, CRNP   acetaminophen 975 mg Oral Q6H PRN Omega Beery, CRNP   albuterol 2 puff Inhalation TID Hiro Sy MD   aluminum-magnesium hydroxide-simethicone 30 mL Oral Q4H PRN Omega Beery, CRNP   benztropine 1 mg Intramuscular BID PRN Omega Beery, CRNP   benztropine 1 mg Oral BID PRN Omega Beery, CRNP   EPINEPHrine PF 0 15 mg Intramuscular PRN Vicky Ciminieri, JOSE CARLOSNP   fluticasone-vilanterol 1 puff Inhalation Daily Ward Marinelli MD   hydrOXYzine HCL 25 mg Oral Q6H PRN Omega Aurora, ABILIO levalbuterol 1 25 mg Nebulization Q8H PRN Ramona Reed MD   Or       sodium chloride 3 mL Nebulization Q8H PRN Ramona Reed MD   levothyroxine 125 mcg Oral Daily Vicky Ciminieri, CRNP   lithium carbonate 300 mg Oral Daily Shelba Rashaad, CRNP   lithium carbonate 450 mg Oral HS Shelba Rashaad, CRNP   LORazepam 1 mg Intramuscular Q4H PRN Beni Farris, CRNP   LORazepam 0 5 mg Oral Q4H PRN Beni Farris, CRNP   magnesium hydroxide 30 mL Oral Daily PRN Beni Farris, CRNP   nicotine 14 mg Transdermal Daily Vicky Ciminieri, CRNP   nicotine polacrilex 2 mg Oral Q2H PRN Beni Farris, CRNP   pantoprazole 20 mg Oral Daily Vicky Ciminieri, CRNP   predniSONE 5 mg Oral Daily Vicky Ciminieri, CRNP   QUEtiapine 450 mg Oral HS Osiris Randall, CRNP   QUEtiapine 50 mg Oral Daily George Garcia MD   theophylline 200 mg Oral Daily Sarai Ano, CRNP   traZODone 25 mg Oral HS PRN Beni Farris, CRNP

## 2019-02-10 NOTE — PROGRESS NOTES
Pt is cooperative with care and is medication compliant  Pt is present in the milieu, social with peers and staff  Pt continues to tolerate 3L of O2 at this time  Pt denies s/s at this time  Will monitor

## 2019-02-11 LAB
ATRIAL RATE: 92 BPM
P AXIS: 78 DEGREES
PR INTERVAL: 160 MS
QRS AXIS: -58 DEGREES
QRSD INTERVAL: 90 MS
QT INTERVAL: 356 MS
QTC INTERVAL: 440 MS
T WAVE AXIS: 77 DEGREES
VENTRICULAR RATE: 92 BPM

## 2019-02-11 PROCEDURE — 93005 ELECTROCARDIOGRAM TRACING: CPT

## 2019-02-11 PROCEDURE — 99232 SBSQ HOSP IP/OBS MODERATE 35: CPT | Performed by: NURSE PRACTITIONER

## 2019-02-11 PROCEDURE — 93010 ELECTROCARDIOGRAM REPORT: CPT | Performed by: INTERNAL MEDICINE

## 2019-02-11 RX ADMIN — LITHIUM CARBONATE 450 MG: 450 TABLET, EXTENDED RELEASE ORAL at 21:56

## 2019-02-11 RX ADMIN — ALBUTEROL SULFATE 2 PUFF: 90 AEROSOL, METERED RESPIRATORY (INHALATION) at 19:21

## 2019-02-11 RX ADMIN — ALBUTEROL SULFATE 2 PUFF: 90 AEROSOL, METERED RESPIRATORY (INHALATION) at 08:37

## 2019-02-11 RX ADMIN — QUETIAPINE 50 MG: 50 TABLET ORAL at 08:42

## 2019-02-11 RX ADMIN — NICOTINE 14 MG: 14 PATCH, EXTENDED RELEASE TRANSDERMAL at 08:42

## 2019-02-11 RX ADMIN — LEVOTHYROXINE SODIUM 125 MCG: 125 TABLET ORAL at 06:04

## 2019-02-11 RX ADMIN — QUETIAPINE FUMARATE 450 MG: 50 TABLET, FILM COATED ORAL at 21:56

## 2019-02-11 RX ADMIN — ALBUTEROL SULFATE 2 PUFF: 90 AEROSOL, METERED RESPIRATORY (INHALATION) at 13:48

## 2019-02-11 RX ADMIN — THEOPHYLLINE ANHYDROUS 200 MG: 200 CAPSULE, EXTENDED RELEASE ORAL at 08:41

## 2019-02-11 RX ADMIN — PANTOPRAZOLE SODIUM 20 MG: 20 TABLET, DELAYED RELEASE ORAL at 06:04

## 2019-02-11 RX ADMIN — LITHIUM CARBONATE EXTENDED-RELEASE TABLET 300 MG: 300 TABLET, FILM COATED, EXTENDED RELEASE ORAL at 08:41

## 2019-02-11 RX ADMIN — PREDNISONE 5 MG: 5 TABLET ORAL at 08:44

## 2019-02-11 RX ADMIN — FLUTICASONE FUROATE AND VILANTEROL TRIFENATATE 1 PUFF: 200; 25 POWDER RESPIRATORY (INHALATION) at 08:43

## 2019-02-11 NOTE — PROGRESS NOTES
Progress Note - Behavioral Health   Roselia Woody 66 y o  female MRN: [id number]  Unit/Bed#: Dinora Patel 814-59 Encounter: [id number]    The patient was seen for continuing care and reviewed with treatment team   Staff reports the patient has been calm and cooperative as well as less somatic  A [employer] representative will be coming out to assess the patient on Thursday  Placement is pending  Patient reports increased anxiety due to issues with her roommate wandering around the room and touching all of her things  No SI and feels safe here  Eating and sleeping well  Remains delusional and paranoid  States she is terrified that her family is going to find out where she is going and that one of her brothers is thinking of killing her  She is accusing the whole family as well as three women of being severely mentally ill and going after her money as well as sleeping with her previous boyfriends  She states she has been hearing all of this through the TV but is also hearing these things even when the TV is off  Still complains that her ears are bothering her and "feel closed up  States she is feeling okay on her medications  Mental Status Evaluation:  Appearance:  Good eye contact and disheveled   Behavior:  cooperative   Mood:  anxious   Affect: mood-congruent   Speech: Rambling   Thought Process: Tangential   Thought Content:  Paranoid, persecutory delusions   Perceptual Disturbances:  Auditory hallucinations without commands   Risk Potential: No suicidal or homicidal ideation   Attention/Concentration [place] than expected   Orientation:   Oriented x3   Gait/Station:  needs assistive device   Motor Activity: No abnormal movement noted     Progress Toward Goals:  No significant change in last 24 hr    Assessment/Plan    Principal Problem:    Schizoaffective disorder, bipolar type (HCC)  Active Problems:    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[facility] )    Tobacco use disorder, continuous    Chronic respiratory failure ([facility] ) Acquired hypothyroidism    Gastroesophageal reflux disease without esophagitis    Abnormal CT of the chest    Excessive cerumen in left ear canal    Lipoma of right upper extremity      Recommended Treatment:      Continue lithium 300 mg q a m  and 450 mg q h s  Current level is therapeutic at 0 9  Continue Seroquel 50 mg q a m  and 450 mg q h s  Continue with pharmacotherapy, group therapy, milieu therapy and occupational therapy    The patient will be maintained on the following medications:    Current Facility-Administered Medications:  acetaminophen 650 mg Oral Q6H PRN Margaux Wendel, CRNP   acetaminophen 650 mg Oral Q4H PRN Margaux Wendel, CRNP   acetaminophen 975 mg Oral Q6H PRN Margaux Wendel, CRNP   albuterol 2 puff Inhalation TID Erlin Mcconnell MD   aluminum-magnesium hydroxide-simethicone 30 mL Oral Q4H PRN Margaux Wendel, CRNP   benztropine 1 mg Intramuscular BID PRN Margaux Wendel, CRNP   benztropine 1 mg Oral BID PRN Margaux Wendel, CRNP   EPINEPHrine PF 0 15 mg Intramuscular PRN Frazier Gosselin, CRNP   fluticasone-vilanterol 1 puff Inhalation Daily Bryson Eldridge MD   hydrOXYzine HCL 25 mg Oral Q6H PRN Margaux Wendel, CRNP   levalbuterol 1 25 mg Nebulization Q8H PRN Erlin Mcconnell MD   Or       sodium chloride 3 mL Nebulization Q8H PRN Erlin Mcconnell MD   levothyroxine 125 mcg Oral Daily Frazier Gosselin, JOSE CARLOSNP   lithium carbonate 300 mg Oral Daily Philly Karst, CRNP   lithium carbonate 450 mg Oral HS Philly Karst, CRNP   LORazepam 1 mg Intramuscular Q4H PRN Margaux Wendel, CRNP   LORazepam 0 5 mg Oral Q4H PRN Margaux Wendel, CRNP   magnesium hydroxide 30 mL Oral Daily PRN Margaux Wendel, CRNP   nicotine 14 mg Transdermal Daily Frazier Gosselin, JOSE CARLOSNP   nicotine polacrilex 2 mg Oral Q2H PRN Margaux Wendel, CRNP   pantoprazole 20 mg Oral Daily Vicky Ciminieri, CRNP   predniSONE 5 mg Oral Daily Vicky Ciminieri, CRNP   QUEtiapine 450 mg Oral HS Carla Tinajero, CRNP   QUEtiapine 50 mg Oral Daily Terra Yadav MD   theophylline 200 mg Oral Daily ABILIO Nguyen   traZODone 25 mg Oral HS PRN ABILIO Urena       Risks, benefits and possible side effects of Medications:   Patient does not verbalize understanding at this time and will require further explanation

## 2019-02-11 NOTE — PROGRESS NOTES
Pt calm, cooperative  Out in milieu watching tv and interaction with peers  Denies s/s  Med compliant  Step son visited and pt requested he leave when the step son started asking her questions about her illness  Pt seems suspicious of this individual  Son left and pt does not want any further visitors unless they call ahead of time and she agrees to it

## 2019-02-11 NOTE — PROGRESS NOTES
Pt brightens upon approach, medication complaint and cooperative with care  Pt comes out for meals and groups and socializes with select peers  Pt denies all s/s including SI and HI  Pt states " I'm feeling good today"  Pt is currently sitting in the dayroom with peers  Will continue to monitor

## 2019-02-11 NOTE — PLAN OF CARE
Problem: Alteration in Thoughts and Perception  Goal: Treatment Goal: Gain control of psychotic behaviors/thinking, reduce/eliminate presenting symptoms and demonstrate improved reality functioning upon discharge  2/11/2019 1743 by Jacobo Goncalves RN  Outcome: Progressing  2/11/2019 1742 by Jacobo Goncalves RN  Outcome: Progressing     Problem: Depression  Goal: Treatment Goal: Demonstrate behavioral control of depressive symptoms, verbalize feelings of improved mood/affect, and adopt new coping skills prior to discharge  2/11/2019 1743 by Jacobo Goncalves RN  Outcome: Progressing  2/11/2019 1742 by Jacobo Goncalves RN  Outcome: Progressing  Goal: Verbalize thoughts and feelings  Description  Interventions:  - Assess and re-assess patient's level of risk   - Engage patient in 1:1 interactions, daily, for a minimum of 15 minutes   - Encourage patient to express feelings, fears, frustrations, hopes    2/11/2019 1743 by Jacobo Goncalves RN  Outcome: Progressing  2/11/2019 1742 by Jacobo Goncalves RN  Outcome: Progressing  Goal: Refrain from harming self  Description  Interventions:  - Monitor patient closely, per order   - Supervise medication ingestion, monitor effects and side effects    2/11/2019 1743 by Jacobo Goncalves RN  Outcome: Progressing  2/11/2019 1742 by Jacobo Goncalves RN  Outcome: Progressing  Goal: Refrain from isolation  Description  Interventions:  - Develop a trusting relationship   - Encourage socialization    2/11/2019 1743 by Jacobo Goncalves RN  Outcome: Progressing  2/11/2019 1742 by Jacobo Goncalves RN  Outcome: Progressing  Goal: Refrain from self-neglect  2/11/2019 1743 by Jacobo Goncalves RN  Outcome: Progressing  2/11/2019 1742 by Jacobo Goncalves RN  Outcome: Progressing  Goal: Attend and participate in unit activities, including therapeutic, recreational, and educational groups  Description  Interventions:  - Provide therapeutic and educational activities daily, encourage attendance and participation, and document same in the medical record    2/11/2019 1743 by Maninder Jessica RN  Outcome: Progressing  2/11/2019 1742 by Maninder Jessica RN  Outcome: Progressing  Goal: Complete daily ADLs, including personal hygiene independently, as able  Description  Interventions:  - Observe, teach, and assist patient with ADLS  -  Monitor and promote a balance of rest/activity, with adequate nutrition and elimination    2/11/2019 1743 by Maninder Jessica RN  Outcome: Progressing  2/11/2019 1742 by Maninder Jessica RN  Outcome: Progressing     Problem: Anxiety  Goal: Anxiety is at manageable level  Description  Interventions:  - Assess and monitor patient's anxiety level  - Monitor for signs and symptoms of anxiety both physical and emotional (heart palpitations, chest pain, shortness of breath, headaches, nausea, feeling jumpy, restlessness, irritable, apprehensive)  - Collaborate with interdisciplinary team and initiate plan and interventions as ordered    - [place] patient to unit/surroundings  - Explain treatment plan  - Encourage participation in care  - Encourage verbalization of concerns/fears  - Identify coping mechanisms  - Assist in developing anxiety-reducing skills  - Administer/offer alternative therapies  - Limit or eliminate stimulants   2/11/2019 1743 by Maninder Jessica RN  Outcome: Progressing  2/11/2019 1742 by Maninder Jessica RN  Outcome: Progressing     Problem: DISCHARGE PLANNING  Goal: Discharge to home or other facility with appropriate resources  Description  INTERVENTIONS:  - Identify barriers to discharge w/patient and caregiver  - Arrange for needed discharge resources and transportation as appropriate  - Identify discharge learning needs (meds, wound care, etc )  - Arrange for interpretive services to assist at discharge as needed  - Refer to Case Management Department for coordinating discharge planning if the patient needs post-hospital services based on physician/advanced practitioner order or complex needs related to functional status, cognitive ability, or social support system   2/11/2019 1743 by Matt Preston RN  Outcome: Progressing  2/11/2019 1742 by Matt Preston RN  Outcome: Progressing     Problem: Ineffective Coping  Goal: Participates in unit activities  Description  Interventions:  - Provide therapeutic environment   - Provide required programming   - Redirect inappropriate behaviors    2/11/2019 1743 by Matt Preston RN  Outcome: Progressing  2/11/2019 1742 by Matt Preston RN  Outcome: Progressing

## 2019-02-11 NOTE — PROGRESS NOTES
Pt compliant with treatment and medication regime  Pt present in the milieu and interacts appropriately with staff and peers  Pt denies s/s, including SI and HI  Will continue to monitor

## 2019-02-11 NOTE — PLAN OF CARE
Pt cooperative to engage in structured unit groups  While in community meeting she requested that her roommate be encouraged to stay away from her, as she felt she had been hovering too closely to her all evening in their room

## 2019-02-12 PROCEDURE — 99232 SBSQ HOSP IP/OBS MODERATE 35: CPT | Performed by: NURSE PRACTITIONER

## 2019-02-12 RX ADMIN — THEOPHYLLINE ANHYDROUS 200 MG: 200 CAPSULE, EXTENDED RELEASE ORAL at 08:56

## 2019-02-12 RX ADMIN — LITHIUM CARBONATE EXTENDED-RELEASE TABLET 300 MG: 300 TABLET, FILM COATED, EXTENDED RELEASE ORAL at 08:56

## 2019-02-12 RX ADMIN — PREDNISONE 5 MG: 5 TABLET ORAL at 08:56

## 2019-02-12 RX ADMIN — QUETIAPINE FUMARATE 450 MG: 50 TABLET, FILM COATED ORAL at 21:51

## 2019-02-12 RX ADMIN — ALBUTEROL SULFATE 2 PUFF: 90 AEROSOL, METERED RESPIRATORY (INHALATION) at 19:52

## 2019-02-12 RX ADMIN — LITHIUM CARBONATE 450 MG: 450 TABLET, EXTENDED RELEASE ORAL at 21:52

## 2019-02-12 RX ADMIN — ALBUTEROL SULFATE 2 PUFF: 90 AEROSOL, METERED RESPIRATORY (INHALATION) at 08:56

## 2019-02-12 RX ADMIN — ALBUTEROL SULFATE 2 PUFF: 90 AEROSOL, METERED RESPIRATORY (INHALATION) at 14:00

## 2019-02-12 RX ADMIN — FLUTICASONE FUROATE AND VILANTEROL TRIFENATATE 1 PUFF: 200; 25 POWDER RESPIRATORY (INHALATION) at 08:58

## 2019-02-12 RX ADMIN — PANTOPRAZOLE SODIUM 20 MG: 20 TABLET, DELAYED RELEASE ORAL at 06:20

## 2019-02-12 RX ADMIN — LEVOTHYROXINE SODIUM 125 MCG: 125 TABLET ORAL at 06:20

## 2019-02-12 RX ADMIN — QUETIAPINE 50 MG: 50 TABLET ORAL at 08:56

## 2019-02-12 RX ADMIN — NICOTINE 14 MG: 14 PATCH, EXTENDED RELEASE TRANSDERMAL at 08:56

## 2019-02-12 NOTE — PLAN OF CARE
Pt present and appropriately engaging in all groups today  She reported feeling ready to make positive changes in her life

## 2019-02-12 NOTE — PROGRESS NOTES
Awake, alert, oriented  Pleasant and cooperative on approach at this time  Medication compliant  Pt with no complaints at this time  Will continue to monitor and encourage compliance

## 2019-02-12 NOTE — PROGRESS NOTES
Progress Note - Behavioral Health   Madison Miner 66 y o  female MRN: [id number]  Unit/Bed#: Barbara Prasad 000-34 Encounter: [id number]    The patient was seen for continuing care and reviewed with treatment team   Staff reports the patient has been pleasant and cooperative as well as medication compliant  She has been appropriate while engaging in structured groups  The patient is pleasant and bright on approach  However she says she talked too much yesterday so she does not really feel like talking this morning  Was not spontaneous with delusional material this morning  Her only complaint at this time is that her roommate wanders in and out of their room and touches her belongings  Mental Status Evaluation:  Appearance:  Adequate hygiene and grooming and Good eye contact   Behavior:  calm and friendly   Mood:  euthymic   Affect: appropriate   Speech: Normal rate and Normal volume   Thought Process:  Goal directed and coherent   Thought Content:  Did not spontaneously voice delusional material   Perceptual Disturbances: Cannot be assessed due to patient factors   Risk Potential: No suicidal or homicidal ideation   Attention/Concentration [place] than expected   Orientation:   Oriented x3   Gait/Station:  needs assistive device   Motor Activity: No abnormal movement noted     Progress Toward Goals:  No significant change in the last 24 hours    Assessment/Plan    Principal Problem:    Schizoaffective disorder, bipolar type ([address] )  Active Problems:    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[address] )    Tobacco use disorder, continuous    Chronic respiratory failure ([address] )    Acquired hypothyroidism    Gastroesophageal reflux disease without esophagitis    Abnormal CT of the chest    Excessive cerumen in left ear canal    Lipoma of right upper extremity      Recommended Treatment:     Continue lithium 300 mg q a m  and 450 mg q h s  Current level is therapeutic at 0 9      Continue Seroquel 50 mg q a m  and 450 mg q h s        Continue with pharmacotherapy, group therapy, milieu therapy and occupational therapy  The patient will be maintained on the following medications:    Current Facility-Administered Medications:  acetaminophen 650 mg Oral Q6H PRN Anais Martínez, CRNP   acetaminophen 650 mg Oral Q4H PRN Anais Martínez, CRNP   acetaminophen 975 mg Oral Q6H PRN Anais Martínez, CRNP   albuterol 2 puff Inhalation TID Yvonne Garcia MD   aluminum-magnesium hydroxide-simethicone 30 mL Oral Q4H PRN Anais Martínez, CRNP   benztropine 1 mg Intramuscular BID PRN Anais Martínez, CRNP   benztropine 1 mg Oral BID PRN Anais Martínez, CRNP   EPINEPHrine PF 0 15 mg Intramuscular PRN Theone Caitlyn, CRNP   fluticasone-vilanterol 1 puff Inhalation Daily Joselito Cosme MD   hydrOXYzine HCL 25 mg Oral Q6H PRN Anais Martínez, CRNP   levalbuterol 1 25 mg Nebulization Q8H PRN Yvonne Garcia MD   Or       sodium chloride 3 mL Nebulization Q8H PRN Yvonne Garcia MD   levothyroxine 125 mcg Oral Daily Theone Caitlyn, CRNP   lithium carbonate 300 mg Oral Daily Nair Mario, CRNP   lithium carbonate 450 mg Oral HS Nair Mario, CRNP   LORazepam 1 mg Intramuscular Q4H PRN Anais Martínez, CRNP   LORazepam 0 5 mg Oral Q4H PRN Anais Martínez, CRNP   magnesium hydroxide 30 mL Oral Daily PRN Anais Martínez, CRNP   nicotine 14 mg Transdermal Daily Theone Caitlyn, CRNP   nicotine polacrilex 2 mg Oral Q2H PRN Anais Martínez, CRNP   pantoprazole 20 mg Oral Daily Vicky Ciminieri, CRNP   predniSONE 5 mg Oral Daily Vicky Ciminieri, CRNP   QUEtiapine 450 mg Oral HS Jackson Gutierrez, CRNP   QUEtiapine 50 mg Oral Daily Huong Sawyer MD   theophylline 200 mg Oral Daily Theone Caitlyn, CRNP   traZODone 25 mg Oral HS PRN Anais Martínez, JOSE CARLOSNP       Risks, benefits and possible side effects of Medications:   Patient does not verbalize understanding at this time and will require further explanation

## 2019-02-13 PROCEDURE — 99232 SBSQ HOSP IP/OBS MODERATE 35: CPT | Performed by: NURSE PRACTITIONER

## 2019-02-13 RX ADMIN — QUETIAPINE FUMARATE 450 MG: 50 TABLET, FILM COATED ORAL at 22:17

## 2019-02-13 RX ADMIN — NICOTINE 14 MG: 14 PATCH, EXTENDED RELEASE TRANSDERMAL at 08:38

## 2019-02-13 RX ADMIN — FLUTICASONE FUROATE AND VILANTEROL TRIFENATATE 1 PUFF: 200; 25 POWDER RESPIRATORY (INHALATION) at 08:39

## 2019-02-13 RX ADMIN — QUETIAPINE 50 MG: 50 TABLET ORAL at 08:37

## 2019-02-13 RX ADMIN — LITHIUM CARBONATE 450 MG: 450 TABLET, EXTENDED RELEASE ORAL at 22:17

## 2019-02-13 RX ADMIN — ALBUTEROL SULFATE 2 PUFF: 90 AEROSOL, METERED RESPIRATORY (INHALATION) at 19:41

## 2019-02-13 RX ADMIN — LEVOTHYROXINE SODIUM 125 MCG: 125 TABLET ORAL at 05:54

## 2019-02-13 RX ADMIN — THEOPHYLLINE ANHYDROUS 200 MG: 200 CAPSULE, EXTENDED RELEASE ORAL at 08:37

## 2019-02-13 RX ADMIN — ALBUTEROL SULFATE 2 PUFF: 90 AEROSOL, METERED RESPIRATORY (INHALATION) at 13:53

## 2019-02-13 RX ADMIN — ALBUTEROL SULFATE 2 PUFF: 90 AEROSOL, METERED RESPIRATORY (INHALATION) at 08:39

## 2019-02-13 RX ADMIN — PANTOPRAZOLE SODIUM 20 MG: 20 TABLET, DELAYED RELEASE ORAL at 06:02

## 2019-02-13 RX ADMIN — LITHIUM CARBONATE EXTENDED-RELEASE TABLET 300 MG: 300 TABLET, FILM COATED, EXTENDED RELEASE ORAL at 08:38

## 2019-02-13 RX ADMIN — PREDNISONE 5 MG: 5 TABLET ORAL at 08:38

## 2019-02-13 NOTE — PLAN OF CARE
Problem: Alteration in Thoughts and Perception  Goal: Treatment Goal: Gain control of psychotic behaviors/thinking, reduce/eliminate presenting symptoms and demonstrate improved reality functioning upon discharge  Outcome: Progressing     Problem: Depression  Goal: Treatment Goal: Demonstrate behavioral control of depressive symptoms, verbalize feelings of improved mood/affect, and adopt new coping skills prior to discharge  Outcome: Progressing  Goal: Verbalize thoughts and feelings  Description  Interventions:  - Assess and re-assess patient's level of risk   - Engage patient in 1:1 interactions, daily, for a minimum of 15 minutes   - Encourage patient to express feelings, fears, frustrations, hopes    Outcome: Progressing  Goal: Refrain from harming self  Description  Interventions:  - Monitor patient closely, per order   - Supervise medication ingestion, monitor effects and side effects    Outcome: Progressing  Goal: Refrain from isolation  Description  Interventions:  - Develop a trusting relationship   - Encourage socialization    Outcome: Progressing  Goal: Refrain from self-neglect  Outcome: Progressing  Goal: Attend and participate in unit activities, including therapeutic, recreational, and educational groups  Description  Interventions:  - Provide therapeutic and educational activities daily, encourage attendance and participation, and document same in the medical record    Outcome: Progressing  Goal: Complete daily ADLs, including personal hygiene independently, as able  Description  Interventions:  - Observe, teach, and assist patient with ADLS  -  Monitor and promote a balance of rest/activity, with adequate nutrition and elimination    Outcome: Progressing     Problem: Anxiety  Goal: Anxiety is at manageable level  Description  Interventions:  - Assess and monitor patient's anxiety level     - Monitor for signs and symptoms of anxiety both physical and emotional (heart palpitations, chest pain, shortness of breath, headaches, nausea, feeling jumpy, restlessness, irritable, apprehensive)  - Collaborate with interdisciplinary team and initiate plan and interventions as ordered    - [place] patient to unit/surroundings  - Explain treatment plan  - Encourage participation in care  - Encourage verbalization of concerns/fears  - Identify coping mechanisms  - Assist in developing anxiety-reducing skills  - Administer/offer alternative therapies  - Limit or eliminate stimulants   Outcome: Progressing     Problem: Ineffective Coping  Goal: Participates in unit activities  Description  Interventions:  - Provide therapeutic environment   - Provide required programming   - Redirect inappropriate behaviors    Outcome: Progressing

## 2019-02-13 NOTE — PLAN OF CARE
Pt attends and fully engages in structured groups and is able to appropriately comment on group topics  When not in a structured setting she discussed thought of paranoia with her peers

## 2019-02-13 NOTE — PROGRESS NOTES
Pt compliant with treatment and medication regime  Pt present in the milieu and interacts appropriately with peers and staff  Pt reports anxiety r/t roommates confusion, denies SI and HI  Will continue to monitor

## 2019-02-13 NOTE — RESPIRATORY THERAPY NOTE
Home Oxygen Qualifying Test       Patient name: Erika Titus        : 1955   Date of Test:  2019  Diagnosis:      Home Oxygen Test:    **Medicare Guidelines require item(s) 1-5 on all ambulatory patients or 1 and 2 on non-ambulatory patients  1   Baseline SPO2 on Room Air at rest 93 %  2   SPO2 during exercise on Room Air 84 %  During exercise monitor SpO2  If SPO2 increases >=89% with ambulation do not add supplemental             oxygen  If <= 88% on room air add O2 via NC and titrate patient  Patient must be ambulated with O2 and titrated to > 88% with exertion  3   SPO2 on Oxygen at rest 97 % 3 lpm         5   Exercise performed:          walking, duration 3 (min)          [x]  Supplemental Home Oxygen is indicated  []  Client does not qualify for home oxygen  Respiratory Additional Notes- re evaluate patient for oxygen needs    Pt still qualifies for home oxygen with ambulation     Rhianna Lopez, RT

## 2019-02-13 NOTE — PROGRESS NOTES
Progress Note - Behavioral Health   Eric [place] 66 y o  female MRN: [id number]  Unit/Bed#: Rodriguez Arriaga 533-41 Encounter: [id number]    The patient was seen for continuing care and reviewed with treatment team   Staff reports the patient has been paranoid that her roommate is trying to kill her because she was touching her oxygen tubing at night  Bright and visible and appropriate in groups  She is being assessed by Derik tomorrow at 9:30 a m  The patient states she has been having increased anxiety related to her roommate touching her belongings and being up at night  She remains paranoid and delusional talking about how these 2 women named Jorge Ordoñez and Priyank Shepherd are sociopaths and stalker and dangerous to her  She believes they have contacted [place] and that they may follow her there or send an ambulance to pick her up and take her somewhere else  She also states that she does not trust some of the staff members here because she believes they are giving information to her family as well as these 2 women  Continues to hear voices from the TV  Says they have been less intrusive and she cannot focus on them because she is consumed by groups" and distracted from them most of the time  But she also states she is a psychic so she knows what is going on anyway  Continues to report that her family is desperately trying to find out where she is and to get information about her because they are trying to hurt her or steal her money  No SI  Eating well  Sleeping okay with the exception of disruptions by her roommate  Despite all of these delusions the patient has been appropriate in the milieu  She is appropriate in a structured group setting  She has been bright, pleasant and cooperative  She has been showering and taking care of herself  Seems to be approaching her baseline      Mental Status Evaluation:  Appearance:  Good eye contact and disheveled   Behavior:  cooperative and friendly Mood:  anxious   Affect: appropriate and broad   Speech: rambling   Thought Process: Tangential   Thought Content:  Paranoid and mistrustful and Delusions of persecution   Perceptual Disturbances: Auditory hallucinations without commands   Risk Potential: No suicidal or homicidal ideation   Attention/Concentration [place] than expected   Orientation:   Oriented x3   Gait/Station:  needs assistive device   Motor Activity: No abnormal movement noted     Progress Toward Goals:  No significant change in the last 24 hr     Assessment/Plan    Principal Problem:    Schizoaffective disorder, bipolar type ([facility])  Active Problems:    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[facility])    Tobacco use disorder, continuous    Chronic respiratory failure ([facility])    Acquired hypothyroidism    Gastroesophageal reflux disease without esophagitis    Abnormal CT of the chest    Excessive cerumen in left ear canal    Lipoma of right upper extremity      Recommended Treatment:      Continue lithium 300 mg q a m  and 450 mg q h s  Current level is therapeutic at 0 9      Continue Seroquel 50 mg q a m  and 450 mg q h s  Continue with pharmacotherapy, group therapy, milieu therapy and occupational therapy    The patient will be maintained on the following medications:    Current Facility-Administered Medications:  acetaminophen 650 mg Oral Q6H PRN Devora [place], CRNP   acetaminophen 650 mg Oral Q4H PRN Devora [place], CRNP   acetaminophen 975 mg Oral Q6H PRN Devora [place], CRNP   albuterol 2 puff Inhalation TID Alena Garcia MD   aluminum-magnesium hydroxide-simethicone 30 mL Oral Q4H PRN Devora [place], CRNP   benztropine 1 mg Intramuscular BID PRN Devora [place], CRNP   benztropine 1 mg Oral BID PRN Devora [place], CRNP   EPINEPHrine PF 0 15 mg Intramuscular PRN Vicky Ciminieri, CRNP   fluticasone-vilanterol 1 puff Inhalation Daily Mary Chery MD   hydrOXYzine HCL 25 mg Oral Q6H PRN Devora [place], CRNP   levalbuterol 1 25 mg Nebulization Q8H PRN Dwayne Rivera MD   Or       sodium chloride 3 mL Nebulization Q8H PRN Dwayne Rivera MD   levothyroxine 125 mcg Oral Daily Vicky Ciminieri, CRNP   lithium carbonate 300 mg Oral Daily Kings Grout, CRNP   lithium carbonate 450 mg Oral HS Kings Grout, CRNP   LORazepam 1 mg Intramuscular Q4H PRN Oval Smoke, CRNP   LORazepam 0 5 mg Oral Q4H PRN Oval Smoke, CRNP   magnesium hydroxide 30 mL Oral Daily PRN Oval Smoke, CRNP   nicotine 14 mg Transdermal Daily Elaine Goff, CRNP   nicotine polacrilex 2 mg Oral Q2H PRN Oval Smoke, CRNP   pantoprazole 20 mg Oral Daily Vicky Ciminieri, CRNP   predniSONE 5 mg Oral Daily Vicky Ciminieri, CRNP   QUEtiapine 450 mg Oral HS Barrington Reyna, CRNP   QUEtiapine 50 mg Oral Daily Fidelina Hernandez MD   theophylline 200 mg Oral Daily Elaine Goff, CRNP   traZODone 25 mg Oral HS PRN Oval Smoke, CRNP       Risks, benefits and possible side effects of Medications:   Patient does not verbalize understanding at this time and will require further explanation

## 2019-02-13 NOTE — PROGRESS NOTES
Pt out and present in the milieu throughout the evening  Pt positively interacting with peers and staff  Denies s/s  Continues to tolerate O2 @ 3L  Pt medication compliant

## 2019-02-13 NOTE — PLAN OF CARE
Problem: Alteration in Thoughts and Perception  Goal: Treatment Goal: Gain control of psychotic behaviors/thinking, reduce/eliminate presenting symptoms and demonstrate improved reality functioning upon discharge  Outcome: Progressing     Problem: Depression  Goal: Treatment Goal: Demonstrate behavioral control of depressive symptoms, verbalize feelings of improved mood/affect, and adopt new coping skills prior to discharge  Outcome: Progressing  Goal: Verbalize thoughts and feelings  Description  Interventions:  - Assess and re-assess patient's level of risk   - Engage patient in 1:1 interactions, daily, for a minimum of 15 minutes   - Encourage patient to express feelings, fears, frustrations, hopes    Outcome: Progressing  Goal: Refrain from harming self  Description  Interventions:  - Monitor patient closely, per order   - Supervise medication ingestion, monitor effects and side effects    Outcome: Progressing  Goal: Refrain from isolation  Description  Interventions:  - Develop a trusting relationship   - Encourage socialization    Outcome: Progressing  Goal: Refrain from self-neglect  Outcome: Progressing  Goal: Attend and participate in unit activities, including therapeutic, recreational, and educational groups  Description  Interventions:  - Provide therapeutic and educational activities daily, encourage attendance and participation, and document same in the medical record    Outcome: Progressing  Goal: Complete daily ADLs, including personal hygiene independently, as able  Description  Interventions:  - Observe, teach, and assist patient with ADLS  -  Monitor and promote a balance of rest/activity, with adequate nutrition and elimination    Outcome: Progressing     Problem: Anxiety  Goal: Anxiety is at manageable level  Description  Interventions:  - Assess and monitor patient's anxiety level     - Monitor for signs and symptoms of anxiety both physical and emotional (heart palpitations, chest pain, shortness of breath, headaches, nausea, feeling jumpy, restlessness, irritable, apprehensive)  - Collaborate with interdisciplinary team and initiate plan and interventions as ordered    - [place] patient to unit/surroundings  - Explain treatment plan  - Encourage participation in care  - Encourage verbalization of concerns/fears  - Identify coping mechanisms  - Assist in developing anxiety-reducing skills  - Administer/offer alternative therapies  - Limit or eliminate stimulants   Outcome: Progressing     Problem: Ineffective Coping  Goal: Participates in unit activities  Description  Interventions:  - Provide therapeutic environment   - Provide required programming   - Redirect inappropriate behaviors    Outcome: Progressing

## 2019-02-13 NOTE — CASE MANAGEMENT
Spoke with Shauna Kast Apgar @[facility] to confirm pt assessment tomorrow at 9:30am

## 2019-02-13 NOTE — PROGRESS NOTES
Patient visible, pleasant upon approach   States '' my room mate stressing me out and she trying to kill me '' Home o2 eval needs to be done  Attended group  Med and meal compliant   Denies depression   Anxiety 3/4 due to roommate  Will continue to provide support

## 2019-02-14 PROCEDURE — 99232 SBSQ HOSP IP/OBS MODERATE 35: CPT | Performed by: NURSE PRACTITIONER

## 2019-02-14 RX ADMIN — THEOPHYLLINE ANHYDROUS 200 MG: 200 CAPSULE, EXTENDED RELEASE ORAL at 08:35

## 2019-02-14 RX ADMIN — LITHIUM CARBONATE EXTENDED-RELEASE TABLET 300 MG: 300 TABLET, FILM COATED, EXTENDED RELEASE ORAL at 08:35

## 2019-02-14 RX ADMIN — TUBERCULIN PURIFIED PROTEIN DERIVATIVE 5 UNITS: 5 INJECTION INTRADERMAL at 12:30

## 2019-02-14 RX ADMIN — QUETIAPINE FUMARATE 450 MG: 50 TABLET, FILM COATED ORAL at 22:06

## 2019-02-14 RX ADMIN — NICOTINE 14 MG: 14 PATCH, EXTENDED RELEASE TRANSDERMAL at 08:36

## 2019-02-14 RX ADMIN — PREDNISONE 5 MG: 5 TABLET ORAL at 08:35

## 2019-02-14 RX ADMIN — QUETIAPINE 50 MG: 50 TABLET ORAL at 08:35

## 2019-02-14 RX ADMIN — FLUTICASONE FUROATE AND VILANTEROL TRIFENATATE 1 PUFF: 200; 25 POWDER RESPIRATORY (INHALATION) at 08:38

## 2019-02-14 RX ADMIN — ALBUTEROL SULFATE 2 PUFF: 90 AEROSOL, METERED RESPIRATORY (INHALATION) at 19:49

## 2019-02-14 RX ADMIN — LEVOTHYROXINE SODIUM 125 MCG: 125 TABLET ORAL at 06:24

## 2019-02-14 RX ADMIN — LITHIUM CARBONATE 450 MG: 450 TABLET, EXTENDED RELEASE ORAL at 22:06

## 2019-02-14 RX ADMIN — ALBUTEROL SULFATE 2 PUFF: 90 AEROSOL, METERED RESPIRATORY (INHALATION) at 08:38

## 2019-02-14 RX ADMIN — ALBUTEROL SULFATE 2 PUFF: 90 AEROSOL, METERED RESPIRATORY (INHALATION) at 14:24

## 2019-02-14 RX ADMIN — PANTOPRAZOLE SODIUM 20 MG: 20 TABLET, DELAYED RELEASE ORAL at 06:23

## 2019-02-14 NOTE — PROGRESS NOTES
Progress Note - Behavioral Health   Cathy Arroyo 66 y o  female MRN: [id number]  Unit/Bed#: Stephanie Patient 388-44 Encounter: [id number]    The patient was seen for continuing care and reviewed with treatment team   Staff reports the patient has been less anxious and less somatic  She was assessed by [facility] [place] this morning  We will await their decision  The patient states her mood is good today  She is feeling very optimistic about her placement  She said the meeting went very well and she is eager to go there if they accept her  Denies depression and anxiety but states if this discharge plan does not work out that will depress her  Continues to complain about her roommate disrupting her sleep  Continues to have the auditory hallucinations of her family members talking about her  States they are a danger to her  Denies SI  Tolerating medications well with no side effects  Less spontaneous with her delusional material today  Less tangential         Mental Status Evaluation:  Appearance:  Good eye contact and disheveled   Behavior:  cooperative and friendly   Mood:  euthymic   Affect: appropriate   Speech: Rambling   Thought Process: Tangential   Thought Content:  Less spontaneous with delusional material   Perceptual Disturbances:  Auditory hallucinations without commands   Risk Potential: No suicidal or homicidal ideation   Attention/Concentration [place] than expected   Orientation:   Oriented x3   Gait/Station: normal gait/station and normal balance   Motor Activity: No abnormal movement noted     Progress Toward Goals:  Approaching baseline    Assessment/Plan    Principal Problem:    Schizoaffective disorder, bipolar type ([facility])  Active Problems:    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HCC)    Tobacco use disorder, continuous    Chronic respiratory failure (HCC)    Acquired hypothyroidism    Gastroesophageal reflux disease without esophagitis    Abnormal CT of the chest    Excessive cerumen in left ear canal    Lipoma of right upper extremity      Recommended Treatment:     Continue lithium 300 mg q a m  and 450 mg q h s  Continue Seroquel 50 mg q a m  and 450 mg q h s  Continue with pharmacotherapy, group therapy, milieu therapy and occupational therapy    The patient will be maintained on the following medications:    Current Facility-Administered Medications:  acetaminophen 650 mg Oral Q6H PRN Prince Moffett, JOSE CARLOSNP   acetaminophen 650 mg Oral Q4H PRN Prince Moffett, JOSE CARLOSNP   acetaminophen 975 mg Oral Q6H PRN Prince Moffett, ABILIO   albuterol 2 puff Inhalation TID Fallon Duke MD   aluminum-magnesium hydroxide-simethicone 30 mL Oral Q4H PRN Prince Moffett, ABILIO   benztropine 1 mg Intramuscular BID PRN Prince Moffett, ABILIO   benztropine 1 mg Oral BID PRN ABILIO Rodriguez   EPINEPHrine PF 0 15 mg Intramuscular PRN Aristides Him, ABILIO   fluticasone-vilanterol 1 puff Inhalation Daily Cathy Wood MD   hydrOXYzine HCL 25 mg Oral Q6H PRN ABILIO Rodriguez   levalbuterol 1 25 mg Nebulization Q8H PRN Fallon Duke MD   Or       sodium chloride 3 mL Nebulization Q8H PRN Fallon Duek MD   levothyroxine 125 mcg Oral Daily Aristides Him, ABILIO   lithium carbonate 300 mg Oral Daily Reford Shave, ABILIO   lithium carbonate 450 mg Oral HS Refmarisel Cleveland, ABILIO   LORazepam 1 mg Intramuscular Q4H PRN ABILIO Rodriguez   LORazepam 0 5 mg Oral Q4H PRN ABILIO Rodriguez   magnesium hydroxide 30 mL Oral Daily PRN ABILIO Rodriguez   nicotine 14 mg Transdermal Daily Aristides Him, ABILIO   nicotine polacrilex 2 mg Oral Q2H PRN ABILIO Rodriguez   pantoprazole 20 mg Oral Daily Vicky Fields, ABILIO   predniSONE 5 mg Oral Daily Vicky Fields, ABILIO   QUEtiapine 450 mg Oral HS Alex Hall, ABILIO   QUEtiapine 50 mg Oral Daily Stanley Salvador MD   theophylline 200 mg Oral Daily Aristides Him, CRNP   traZODone 25 mg Oral HS PRN ABILIO Rodriguez   tuberculin 5 Units Intradermal Once Antoine [place], ABILIO Risks, benefits and possible side effects of Medications:   Patient does not verbalize understanding at this time and will require further explanation

## 2019-02-14 NOTE — PROGRESS NOTES
Patient reports being depressed she attributes this to issues with her balance  This issue appears somatic in nature  Patient expresses being hopeful she will get her placement  Patient is cooperative with care  Social with others

## 2019-02-14 NOTE — CASE MANAGEMENT
[facility] assessment completed with pt, Jessica Apgar and Shani (RN)  Meeting went very well  Pt was appropriate but relayed some paranoid delusions regarding her family and last [facility]  Germán [place] stated that pt would be a great fit but they need to look into whether or not they'd be able to accommodate her O2 needs  She stated that they've turned down other pt's who have O2 tank because they don't have 24/7 nursing care  CM stated that pt manages her O2 independently and has for 6+ years  She also has an outside medical equipment agency who delivers her O2 supply  Germán [place] will get back to CM with questions/decision

## 2019-02-14 NOTE — PLAN OF CARE
Pt displayed mild irritability while in session  Overall engaged in structured task appropriately but personalized her approach during progressive muscle relaxation to achieved relaxation

## 2019-02-14 NOTE — PLAN OF CARE
Problem: Alteration in Thoughts and Perception  Goal: Treatment Goal: Gain control of psychotic behaviors/thinking, reduce/eliminate presenting symptoms and demonstrate improved reality functioning upon discharge  Outcome: Progressing     Problem: Depression  Goal: Treatment Goal: Demonstrate behavioral control of depressive symptoms, verbalize feelings of improved mood/affect, and adopt new coping skills prior to discharge  Outcome: Progressing  Goal: Verbalize thoughts and feelings  Description  Interventions:  - Assess and re-assess patient's level of risk   - Engage patient in 1:1 interactions, daily, for a minimum of 15 minutes   - Encourage patient to express feelings, fears, frustrations, hopes    Outcome: Progressing  Goal: Refrain from harming self  Description  Interventions:  - Monitor patient closely, per order   - Supervise medication ingestion, monitor effects and side effects    Outcome: Progressing  Goal: Refrain from isolation  Description  Interventions:  - Develop a trusting relationship   - Encourage socialization    Outcome: Progressing  Goal: Refrain from self-neglect  Outcome: Progressing  Goal: Attend and participate in unit activities, including therapeutic, recreational, and educational groups  Description  Interventions:  - Provide therapeutic and educational activities daily, encourage attendance and participation, and document same in the medical record    Outcome: Progressing  Goal: Complete daily ADLs, including personal hygiene independently, as able  Description  Interventions:  - Observe, teach, and assist patient with ADLS  -  Monitor and promote a balance of rest/activity, with adequate nutrition and elimination    Outcome: Progressing     Problem: Anxiety  Goal: Anxiety is at manageable level  Description  Interventions:  - Assess and monitor patient's anxiety level     - Monitor for signs and symptoms of anxiety both physical and emotional (heart palpitations, chest pain, shortness of breath, headaches, nausea, feeling jumpy, restlessness, irritable, apprehensive)  - Collaborate with interdisciplinary team and initiate plan and interventions as ordered    - [place] patient to unit/surroundings  - Explain treatment plan  - Encourage participation in care  - Encourage verbalization of concerns/fears  - Identify coping mechanisms  - Assist in developing anxiety-reducing skills  - Administer/offer alternative therapies  - Limit or eliminate stimulants   Outcome: Progressing

## 2019-02-15 PROCEDURE — 99232 SBSQ HOSP IP/OBS MODERATE 35: CPT | Performed by: NURSE PRACTITIONER

## 2019-02-15 RX ADMIN — ALBUTEROL SULFATE 2 PUFF: 90 AEROSOL, METERED RESPIRATORY (INHALATION) at 13:35

## 2019-02-15 RX ADMIN — ALBUTEROL SULFATE 2 PUFF: 90 AEROSOL, METERED RESPIRATORY (INHALATION) at 08:37

## 2019-02-15 RX ADMIN — QUETIAPINE FUMARATE 450 MG: 50 TABLET, FILM COATED ORAL at 21:58

## 2019-02-15 RX ADMIN — PREDNISONE 5 MG: 5 TABLET ORAL at 08:38

## 2019-02-15 RX ADMIN — THEOPHYLLINE ANHYDROUS 200 MG: 200 CAPSULE, EXTENDED RELEASE ORAL at 08:38

## 2019-02-15 RX ADMIN — FLUTICASONE FUROATE AND VILANTEROL TRIFENATATE 1 PUFF: 200; 25 POWDER RESPIRATORY (INHALATION) at 08:40

## 2019-02-15 RX ADMIN — LEVOTHYROXINE SODIUM 125 MCG: 125 TABLET ORAL at 06:38

## 2019-02-15 RX ADMIN — LITHIUM CARBONATE 450 MG: 450 TABLET, EXTENDED RELEASE ORAL at 21:58

## 2019-02-15 RX ADMIN — ALBUTEROL SULFATE 2 PUFF: 90 AEROSOL, METERED RESPIRATORY (INHALATION) at 19:37

## 2019-02-15 RX ADMIN — PANTOPRAZOLE SODIUM 20 MG: 20 TABLET, DELAYED RELEASE ORAL at 06:38

## 2019-02-15 RX ADMIN — NICOTINE 14 MG: 14 PATCH, EXTENDED RELEASE TRANSDERMAL at 08:38

## 2019-02-15 RX ADMIN — LITHIUM CARBONATE EXTENDED-RELEASE TABLET 300 MG: 300 TABLET, FILM COATED, EXTENDED RELEASE ORAL at 08:38

## 2019-02-15 RX ADMIN — QUETIAPINE 50 MG: 50 TABLET ORAL at 08:38

## 2019-02-15 NOTE — CASE MANAGEMENT
Writer left a message for Marilin Mckeon [phone]  requesting a call back regarding status of assessment for [facility]

## 2019-02-15 NOTE — PROGRESS NOTES
Patient spent evening sitting in day room  Did not want to talk about visit with [facility] staff because she was afraid someone would overhear  States she does not know if she should go because she gets flare up of vertigo  Patient is social with staff and peers, initiates conversation and participates in group  After medication administration, patient requested having a walker or someone to walk with her because her ears were feeling "full" again  Staff walked with her with no need for physical assistance  Manages O2 without difficulty

## 2019-02-15 NOTE — PROGRESS NOTES
Pt is calm, cooperative with care and medication compliant  Pt reports anxiety regarding " Feeling like I wasn't going to get brought out to breakfast" and depressed regarding " the way my ears are feeling still"  Pt denies all other s/s including SI and HI  Pt is social with peers and visible on the unit  Pt comes out for meals and groups and is currently sitting with peers in the day room  Will continue to monitor

## 2019-02-15 NOTE — PROGRESS NOTES
Progress Note - Behavioral Health   Frederick Alu 66 y o  female MRN: [id number]  Unit/Bed#: Claudeen Cullens 222-19 Encounter: [id number]    The patient was seen for continuing care and reviewed with treatment team   Staff reports patient has been social and visible  She has been paranoid about anyone overhearing where she is going after discharge  Remains dramatic and has been complaining about her ear again  She has been seen by Internal Medicine for this  The patient is bright on approach, she is somewhat paranoid asking repeatedly to close the door because she does not want anyone to overhear where she is going to be discharged  She reports she is happy that she has a new roommate  Reports increased anxiety however due to the room change  Remains grandiose thinking that several staff members are jealous of her  Voiced several somatic complaints  Continues to have auditory hallucinations but they no longer interfere with her participating in structured groups  Appetite and sleep have been good  Tolerating medications  Feels optimistic about going to [address] [place] [address] after discharge however she has a whole list of questions that she says she needs to have answered before she will feel totally at ease with the place  She wants to make sure they have locks on the door and that she is allowed to have visitors and wonders if there will be a curfew or if she can have snacks in her room  Mental Status Evaluation:  Appearance:  Good eye contact and disheveled   Behavior:  cooperative and friendly   Mood:  anxious   Affect: appropriate   Speech: Rambling   Thought Process:  Less tangential   Thought Content:  Paranoid and mistrustful, Grandiose delusions and Somatic delusions   Perceptual Disturbances:  Auditory hallucinations without commands   Risk Potential: No suicidal or homicidal ideation   Attention/Concentration [place] than expected   Orientation:   Oriented x3   Gait/Station:  needs assistive device   Motor Activity: No abnormal movement noted     Progress Toward Goals:  Approaching baseline    Assessment/Plan    Principal Problem:    Schizoaffective disorder, bipolar type (HCC)  Active Problems:    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HCC)    Tobacco use disorder, continuous    Chronic respiratory failure (HCC)    Acquired hypothyroidism    Gastroesophageal reflux disease without esophagitis    Abnormal CT of the chest    Excessive cerumen in left ear canal    Lipoma of right upper extremity      Recommended Treatment:     Continue lithium 300 mg q a m  and 450 mg q h s      Continue Seroquel 50 mg q a m  and 450 mg q h s  Continue with pharmacotherapy, group therapy, milieu therapy and occupational therapy    The patient will be maintained on the following medications:    Current Facility-Administered Medications:  acetaminophen 650 mg Oral Q6H PRN Anson Muck, CRNP   acetaminophen 650 mg Oral Q4H PRN Anson Muck, CRNP   acetaminophen 975 mg Oral Q6H PRN Anson Muck, CRNP   albuterol 2 puff Inhalation TID Jamaal Galan MD   aluminum-magnesium hydroxide-simethicone 30 mL Oral Q4H PRN Anson Muck, CRNP   benztropine 1 mg Intramuscular BID PRN Anson Muck, CRNP   benztropine 1 mg Oral BID PRN Anson Muck, CRNP   EPINEPHrine PF 0 15 mg Intramuscular PRN Ivin Ha, CRNP   fluticasone-vilanterol 1 puff Inhalation Daily Betzy Noonan MD   hydrOXYzine HCL 25 mg Oral Q6H PRN Anson Muck, CRNP   levalbuterol 1 25 mg Nebulization Q8H PRN Jamaal Galan MD   Or       sodium chloride 3 mL Nebulization Q8H PRN Jamaal Galan MD   levothyroxine 125 mcg Oral Daily Vicky Donnie, CRNP   lithium carbonate 300 mg Oral Daily Charles Kyara, CRNP   lithium carbonate 450 mg Oral HS Charles Kyara, CRNP   LORazepam 1 mg Intramuscular Q4H PRN Anson Muck, CRNP   LORazepam 0 5 mg Oral Q4H PRN Anson Muck, CRNP   magnesium hydroxide 30 mL Oral Daily PRN Anson Muck, CRNP   nicotine 14 mg Transdermal Daily Vickyshailesh Fields, ABILIO   nicotine polacrilex 2 mg Oral Q2H PRN ABILIO Adler   pantoprazole 20 mg Oral Daily ABILIO Nguyen   predniSONE 5 mg Oral Daily Vicky ABILIO Fields   QUEtiapine 450 mg Oral HS Nir Yañez, ABILIO   QUEtiapine 50 mg Oral Daily Meche Gilliam MD   theophylline 200 mg Oral Daily ABILIO Oneal   traZODone 25 mg Oral HS PRN ABILIO Adler   tuberculin 5 Units Intradermal Once ABILIO Quinteros       Risks, benefits and possible side effects of Medications:   Patient does not verbalize understanding at this time and will require further explanation

## 2019-02-15 NOTE — PLAN OF CARE
Problem: Alteration in Thoughts and Perception  Goal: Treatment Goal: Gain control of psychotic behaviors/thinking, reduce/eliminate presenting symptoms and demonstrate improved reality functioning upon discharge  Outcome: Progressing     Problem: Depression  Goal: Treatment Goal: Demonstrate behavioral control of depressive symptoms, verbalize feelings of improved mood/affect, and adopt new coping skills prior to discharge  Outcome: Progressing  Goal: Verbalize thoughts and feelings  Description  Interventions:  - Assess and re-assess patient's level of risk   - Engage patient in 1:1 interactions, daily, for a minimum of 15 minutes   - Encourage patient to express feelings, fears, frustrations, hopes    Outcome: Progressing  Goal: Refrain from harming self  Description  Interventions:  - Monitor patient closely, per order   - Supervise medication ingestion, monitor effects and side effects    Outcome: Progressing  Goal: Refrain from isolation  Description  Interventions:  - Develop a trusting relationship   - Encourage socialization    Outcome: Progressing  Goal: Refrain from self-neglect  Outcome: Progressing  Goal: Attend and participate in unit activities, including therapeutic, recreational, and educational groups  Description  Interventions:  - Provide therapeutic and educational activities daily, encourage attendance and participation, and document same in the medical record    Outcome: Progressing  Goal: Complete daily ADLs, including personal hygiene independently, as able  Description  Interventions:  - Observe, teach, and assist patient with ADLS  -  Monitor and promote a balance of rest/activity, with adequate nutrition and elimination    Outcome: Progressing     Problem: Anxiety  Goal: Anxiety is at manageable level  Description  Interventions:  - Assess and monitor patient's anxiety level     - Monitor for signs and symptoms of anxiety both physical and emotional (heart palpitations, chest pain, shortness of breath, headaches, nausea, feeling jumpy, restlessness, irritable, apprehensive)  - Collaborate with interdisciplinary team and initiate plan and interventions as ordered    - [place] patient to unit/surroundings  - Explain treatment plan  - Encourage participation in care  - Encourage verbalization of concerns/fears  - Identify coping mechanisms  - Assist in developing anxiety-reducing skills  - Administer/offer alternative therapies  - Limit or eliminate stimulants   Outcome: Progressing     Problem: Ineffective Coping  Goal: Participates in unit activities  Description  Interventions:  - Provide therapeutic environment   - Provide required programming   - Redirect inappropriate behaviors    Outcome: Progressing

## 2019-02-15 NOTE — MEDICAL STUDENT
Subjective:  SULEMAN is a 67 yo female on hospital day 31 admitted on a 201 status for schizoaffective disorder, bipolar type  Her case was discussed in morning case review with the treatment team   Per nursing, she is depressed because of her balance issues and does not want to speak about [facility] because she does not want someone to overhear  Per CHRISTOPHER, she was engaged in group session  The patient was pleasant on approach an states she is "happy" because she got a new roommate that she can have conversation with  However, she does report some anxiety in her new room, stating she feels agoraphobic when she looks out the window  She also reports the staff is "jealous of her" but cannot reason why they are jealous of her  She still has somatic complaints of ear fullness and wants it flushed  She still reports auditory hallucinations about Dr Saintclair Burnet calling her previous place of residence  Overall she reports good sleep, appetite and is tolerating medications well  She has a several questions regarding her possible placement at [facility] which were deferred to the   Objective:  Appearance: appropriate  Mood: anxious  Affect: mood-congruent  Delusions: grandiose  Hallucinations: auditory   SI/HI: denies  Oriented: x3  Speech: normal rate and volume   Motor: no abnormal movement noted  Assesment/Plan:  1   Schizoaffective disorder, bipolar type  Continue lithium 450 QHS  Continue seroquel 500mg QD  Continue theophylline 200mg QD  Continue occupational therapy and behavior checks           Jennifer HAYDEN

## 2019-02-16 PROCEDURE — 99231 SBSQ HOSP IP/OBS SF/LOW 25: CPT | Performed by: PSYCHIATRY & NEUROLOGY

## 2019-02-16 RX ADMIN — ALBUTEROL SULFATE 2 PUFF: 90 AEROSOL, METERED RESPIRATORY (INHALATION) at 20:15

## 2019-02-16 RX ADMIN — LEVOTHYROXINE SODIUM 125 MCG: 125 TABLET ORAL at 05:57

## 2019-02-16 RX ADMIN — QUETIAPINE 50 MG: 50 TABLET ORAL at 08:56

## 2019-02-16 RX ADMIN — THEOPHYLLINE ANHYDROUS 200 MG: 200 CAPSULE, EXTENDED RELEASE ORAL at 08:56

## 2019-02-16 RX ADMIN — FLUTICASONE FUROATE AND VILANTEROL TRIFENATATE 1 PUFF: 200; 25 POWDER RESPIRATORY (INHALATION) at 08:58

## 2019-02-16 RX ADMIN — NICOTINE 14 MG: 14 PATCH, EXTENDED RELEASE TRANSDERMAL at 08:54

## 2019-02-16 RX ADMIN — LITHIUM CARBONATE 450 MG: 450 TABLET, EXTENDED RELEASE ORAL at 22:01

## 2019-02-16 RX ADMIN — PANTOPRAZOLE SODIUM 20 MG: 20 TABLET, DELAYED RELEASE ORAL at 06:02

## 2019-02-16 RX ADMIN — ALBUTEROL SULFATE 2 PUFF: 90 AEROSOL, METERED RESPIRATORY (INHALATION) at 08:55

## 2019-02-16 RX ADMIN — LITHIUM CARBONATE EXTENDED-RELEASE TABLET 300 MG: 300 TABLET, FILM COATED, EXTENDED RELEASE ORAL at 08:56

## 2019-02-16 RX ADMIN — PREDNISONE 5 MG: 5 TABLET ORAL at 08:56

## 2019-02-16 RX ADMIN — ALBUTEROL SULFATE 2 PUFF: 90 AEROSOL, METERED RESPIRATORY (INHALATION) at 13:47

## 2019-02-16 RX ADMIN — QUETIAPINE FUMARATE 450 MG: 50 TABLET, FILM COATED ORAL at 22:01

## 2019-02-16 NOTE — PROGRESS NOTES
Pt present in the milieu throughout the evening  Pt denies s/s  Pt appeared calm and cooperation  Pt medication compliant

## 2019-02-16 NOTE — PROGRESS NOTES
Progress Notes- Behavioral Health       Assessment/Plan  Principal Problem:  F27 who schizoaffective disorder  PLAN:   1) Continue current medications  2) Continue Group and individual therapy  3) Discharge planing  4) discussed with staff  INTERVAL HISTORY:  Patient says that she is doing much better now she is waiting for her placement but she is much improved she is taking her medication she is waiting to have replacement denied any hallucinations delusions denied any problems she was positive towards this writer  Staff also reports that patient is compliant with medication and has improved    Scheduled Meds:  Scheduled Meds:  Current Facility-Administered Medications:  acetaminophen 650 mg Oral Q6H PRN JOSE CARLOS UrenaNP   acetaminophen 650 mg Oral Q4H PRN ABILIO Urena   acetaminophen 975 mg Oral Q6H PRN ABILIO Urena   albuterol 2 puff Inhalation TID Say Harrington MD   aluminum-magnesium hydroxide-simethicone 30 mL Oral Q4H PRN ABILIO Urena   benztropine 1 mg Intramuscular BID PRN ABILIO Urena   benztropine 1 mg Oral BID PRN ABILIO Urena   EPINEPHrine PF 0 15 mg Intramuscular PRN ABILIO [place]   fluticasone-vilanterol 1 puff Inhalation Daily Kyler Bueno MD   hydrOXYzine HCL 25 mg Oral Q6H PRN ABILIO Urena   levalbuterol 1 25 mg Nebulization Q8H PRN Say Harrington MD   Or       sodium chloride 3 mL Nebulization Q8H PRN Say Harrington MD   levothyroxine 125 mcg Oral Daily ABILIO Nguyen   lithium carbonate 300 mg Oral Daily Samson Dodson, ABILIO   lithium carbonate 450 mg Oral HS ABILIO Pearl   LORazepam 1 mg Intramuscular Q4H PRN ABILIO Urena   LORazepam 0 5 mg Oral Q4H PRN ABILIO Urena   magnesium hydroxide 30 mL Oral Daily PRN ABILIO Urena   nicotine 14 mg Transdermal Daily Vicky Cimryan, ABILIO   nicotine polacrilex 2 mg Oral Q2H PRN ABILIO Urena   pantoprazole 20 mg Oral Daily Vicky Ciminieri, CRNP   predniSONE 5 mg Oral Daily Vicky Ciminieri, CRNP   QUEtiapine 450 mg Oral HS Rosalina Im, CRNP   QUEtiapine 50 mg Oral Daily Tee Goyal MD   theophylline 200 mg Oral Daily Maxene Spillers, CRNP   traZODone 25 mg Oral HS PRN Patricio Alcaty, CRGAYLA   tuberculin 5 Units Intradermal Once ABILIO Porras     Continuous Infusions:   PRN Meds:   acetaminophen    acetaminophen    acetaminophen    aluminum-magnesium hydroxide-simethicone    benztropine    benztropine    EPINEPHrine PF    hydrOXYzine HCL    levalbuterol **OR** sodium chloride    LORazepam    LORazepam    magnesium hydroxide    nicotine polacrilex    traZODone     Allergies   Allergen Reactions    Peanut-Containing Drug Products Anaphylaxis    Shellfish-Derived Products Anaphylaxis    Antihistamines, Chlorpheniramine-Type     Aspirin     Atrovent [Ipratropium]     Elavil [Amitriptyline]     Levaquin [Levofloxacin]     Novocain [Procaine]     Penicillins     Prolixin [Fluphenazine]        Vitals:    02/15/19 1525 02/15/19 2143 02/16/19 0732 02/16/19 1507   BP: 114/71 122/72 111/58 107/64   BP Location: Right arm Right arm Right arm Right arm   Pulse: 84 84 82 100   Resp: 18 18 16 18   Temp: 98 2 °F (36 8 °C) 98 6 °F (37 °C) (!) 97 3 °F (36 3 °C) 98 8 °F (37 1 °C)   TempSrc: Tympanic Tympanic Tympanic Tympanic   SpO2: 99% 99% 98% 96%   Weight:       Height:           Continuous Infusions:     No Known Allergies           Mental Status Evaluation:  Appearance:   appeared of age and had good hygiene    Behavior:   behavior was cooperative    Speech:  Goal directed, grabbled and at times hard to understand  Mood:  "OK"  Affect Normal    Language: naming objects and Goal directed  Thought Process:  Continues to have some paranoia  Thought Content:  Normal    Perceptual Disturbances:  denying any auditory and visual hallucinations  Risk Potential: Denying any suicidal homicidal ideas     Sensorium: person, place, time/date, situation, day of week, month of year and year   Cognition:  grossly intact, fair memory  Consciousness:   alert, awake, and oriented ×3    Attention: Good attention span   Intellect: Normal   Fund of Knowledge: awareness of current events: normal    Insight:  fair   Judgment: OK  Muscle Strength and Tone: The   Gait/Station: Normal gait  Motor Activity: no abnormal movements     Lab Results: I have personally reviewed pertinent lab results  NOTE:  Total of 15  minutes were spent in talking to patient completing this medical record reviewing medical chart medical decision making    Twyla Arroyo MD

## 2019-02-16 NOTE — PLAN OF CARE
Problem: Alteration in Thoughts and Perception  Goal: Treatment Goal: Gain control of psychotic behaviors/thinking, reduce/eliminate presenting symptoms and demonstrate improved reality functioning upon discharge  Outcome: Progressing     Problem: Depression  Goal: Treatment Goal: Demonstrate behavioral control of depressive symptoms, verbalize feelings of improved mood/affect, and adopt new coping skills prior to discharge  Outcome: Progressing  Goal: Verbalize thoughts and feelings  Description  Interventions:  - Assess and re-assess patient's level of risk   - Engage patient in 1:1 interactions, daily, for a minimum of 15 minutes   - Encourage patient to express feelings, fears, frustrations, hopes    Outcome: Progressing  Goal: Refrain from harming self  Description  Interventions:  - Monitor patient closely, per order   - Supervise medication ingestion, monitor effects and side effects    Outcome: Progressing  Goal: Refrain from isolation  Description  Interventions:  - Develop a trusting relationship   - Encourage socialization    Outcome: Progressing  Goal: Refrain from self-neglect  Outcome: Progressing  Goal: Attend and participate in unit activities, including therapeutic, recreational, and educational groups  Description  Interventions:  - Provide therapeutic and educational activities daily, encourage attendance and participation, and document same in the medical record    Outcome: Progressing  Goal: Complete daily ADLs, including personal hygiene independently, as able  Description  Interventions:  - Observe, teach, and assist patient with ADLS  -  Monitor and promote a balance of rest/activity, with adequate nutrition and elimination    Outcome: Progressing     Problem: Anxiety  Goal: Anxiety is at manageable level  Description  Interventions:  - Assess and monitor patient's anxiety level     - Monitor for signs and symptoms of anxiety both physical and emotional (heart palpitations, chest pain, shortness of breath, headaches, nausea, feeling jumpy, restlessness, irritable, apprehensive)  - Collaborate with interdisciplinary team and initiate plan and interventions as ordered    - [place] patient to unit/surroundings  - Explain treatment plan  - Encourage participation in care  - Encourage verbalization of concerns/fears  - Identify coping mechanisms  - Assist in developing anxiety-reducing skills  - Administer/offer alternative therapies  - Limit or eliminate stimulants   Outcome: Progressing     Problem: Ineffective Coping  Goal: Participates in unit activities  Description  Interventions:  - Provide therapeutic environment   - Provide required programming   - Redirect inappropriate behaviors    Outcome: Progressing

## 2019-02-16 NOTE — PROGRESS NOTES
Pt is calm, cooperative with care and medication compliant  Pt reported having " some anxiety earlier in the morning" regarding taking a shower later in the morning and " not wanting to fall"  Pt denies all other s/s including SI and HI  Pt is visible on the unit and social with peers  Pt comes out for meals and groups  Pt is currently socializing in the dayroom with peers  Will continue to monitor

## 2019-02-16 NOTE — PROGRESS NOTES
Patient compliant with group, meal and medication regime  Pt reported anxiety related to being moved to bed one  Pt prefers bed two in the room and is agreeable to a possible change if roommate leaves on Monday  Pt denies all other s/s including SI/HI  Pt visible in the milieu currently socializing with peers and staff  Will continue to monitor

## 2019-02-17 PROCEDURE — 99231 SBSQ HOSP IP/OBS SF/LOW 25: CPT | Performed by: PSYCHIATRY & NEUROLOGY

## 2019-02-17 RX ADMIN — NICOTINE 14 MG: 14 PATCH, EXTENDED RELEASE TRANSDERMAL at 09:10

## 2019-02-17 RX ADMIN — LITHIUM CARBONATE 450 MG: 450 TABLET, EXTENDED RELEASE ORAL at 21:55

## 2019-02-17 RX ADMIN — ALBUTEROL SULFATE 2 PUFF: 90 AEROSOL, METERED RESPIRATORY (INHALATION) at 19:31

## 2019-02-17 RX ADMIN — PANTOPRAZOLE SODIUM 20 MG: 20 TABLET, DELAYED RELEASE ORAL at 06:11

## 2019-02-17 RX ADMIN — ALBUTEROL SULFATE 2 PUFF: 90 AEROSOL, METERED RESPIRATORY (INHALATION) at 09:06

## 2019-02-17 RX ADMIN — LITHIUM CARBONATE EXTENDED-RELEASE TABLET 300 MG: 300 TABLET, FILM COATED, EXTENDED RELEASE ORAL at 09:07

## 2019-02-17 RX ADMIN — THEOPHYLLINE ANHYDROUS 200 MG: 200 CAPSULE, EXTENDED RELEASE ORAL at 09:08

## 2019-02-17 RX ADMIN — ALBUTEROL SULFATE 2 PUFF: 90 AEROSOL, METERED RESPIRATORY (INHALATION) at 13:41

## 2019-02-17 RX ADMIN — QUETIAPINE 50 MG: 50 TABLET ORAL at 09:08

## 2019-02-17 RX ADMIN — PREDNISONE 5 MG: 5 TABLET ORAL at 09:07

## 2019-02-17 RX ADMIN — LEVOTHYROXINE SODIUM 125 MCG: 125 TABLET ORAL at 06:11

## 2019-02-17 RX ADMIN — QUETIAPINE FUMARATE 450 MG: 50 TABLET, FILM COATED ORAL at 21:55

## 2019-02-17 RX ADMIN — ALUMINUM HYDROXIDE, MAGNESIUM HYDROXIDE, AND SIMETHICONE 30 ML: 200; 200; 20 SUSPENSION ORAL at 13:41

## 2019-02-17 RX ADMIN — FLUTICASONE FUROATE AND VILANTEROL TRIFENATATE 1 PUFF: 200; 25 POWDER RESPIRATORY (INHALATION) at 09:12

## 2019-02-17 NOTE — PROGRESS NOTES
Progress Note - Behavioral Health   Jose Cardenas 66 y o  female MRN: @MRN   Unit/Bed#Feliberto Knight 753-08 Encounter: [id number]        The patient was seen for continuing care and reviewed with staff  Report from staff regarding this patient received and discussed, and records reviewed prior to seeing this patient   Less paranois, more cheerful  Imprioved mood and less anxious  Still has concerns about here, that the was a part of her paranoid delusional believes that there was some sort of device uncertainty in her ear  The patient did not voice such delusion today  Sleep is good  Appetite normal    Medication side effects:no complaints  ROS: No     Mental Status Evaluation:  Dressed in hospital attire  Normal eye contact  Improved mood at times even cheerful  Improved affect  Was not grossly paranoid but likely has residual paranoid ideations  Thought responding to internal stimuli  [place] thought pattern  Alert  Recent memory was grossly intact  Insight and judgment restricted but improving  Progress Toward Goals: improving slowly    Assessment/Plan   Principal Problem:    Schizoaffective disorder, bipolar type (HCC)  Active Problems:    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HCC)    Tobacco use disorder, continuous    Chronic respiratory failure (HCC)    Acquired hypothyroidism    Gastroesophageal reflux disease without esophagitis    Abnormal CT of the chest    Excessive cerumen in left ear canal    Lipoma of right upper extremity      Recommended Treatment:  Will continue the present medication, the patient condition is slowly improving  Brief supportive therapy was provided, the patient was receptive  Planned medication and treatment changes: All current active medications have been reviewed  Continue treatment with group therapy, milieu therapy, occupational therapy and medication management        Risks / Benefits of Treatment:    Risks, benefits, and possible side effects of medications explained to patient  Patient has limited understanding of risks and benefits of treatment at this time, but agrees to take medications as prescribed  Counseling / Coordination of Care:    Patient's progress reviewed with nursing staff  ** Please Note: This note has been constructed using a voice recognition system   **

## 2019-02-17 NOTE — PROGRESS NOTES
Pt is calm, cooperative with care and medication compliant  Pt denies all s/s including SI and HI  Pt is visible on the unit and comes out for meals and groups  Pt is social with peers and brightens upon approach  Pt is currently attending group with peers  Will continue to monitor

## 2019-02-17 NOTE — PLAN OF CARE
Problem: Alteration in Thoughts and Perception  Goal: Treatment Goal: Gain control of psychotic behaviors/thinking, reduce/eliminate presenting symptoms and demonstrate improved reality functioning upon discharge  Outcome: Progressing     Problem: Depression  Goal: Treatment Goal: Demonstrate behavioral control of depressive symptoms, verbalize feelings of improved mood/affect, and adopt new coping skills prior to discharge  Outcome: Progressing  Goal: Verbalize thoughts and feelings  Description  Interventions:  - Assess and re-assess patient's level of risk   - Engage patient in 1:1 interactions, daily, for a minimum of 15 minutes   - Encourage patient to express feelings, fears, frustrations, hopes    Outcome: Progressing  Goal: Refrain from harming self  Description  Interventions:  - Monitor patient closely, per order   - Supervise medication ingestion, monitor effects and side effects    Outcome: Progressing  Goal: Refrain from isolation  Description  Interventions:  - Develop a trusting relationship   - Encourage socialization    Outcome: Progressing  Goal: Refrain from self-neglect  Outcome: Progressing  Goal: Attend and participate in unit activities, including therapeutic, recreational, and educational groups  Description  Interventions:  - Provide therapeutic and educational activities daily, encourage attendance and participation, and document same in the medical record    Outcome: Progressing  Goal: Complete daily ADLs, including personal hygiene independently, as able  Description  Interventions:  - Observe, teach, and assist patient with ADLS  -  Monitor and promote a balance of rest/activity, with adequate nutrition and elimination    Outcome: Progressing     Problem: Anxiety  Goal: Anxiety is at manageable level  Description  Interventions:  - Assess and monitor patient's anxiety level     - Monitor for signs and symptoms of anxiety both physical and emotional (heart palpitations, chest pain, shortness of breath, headaches, nausea, feeling jumpy, restlessness, irritable, apprehensive)  - Collaborate with interdisciplinary team and initiate plan and interventions as ordered    - [place] patient to unit/surroundings  - Explain treatment plan  - Encourage participation in care  - Encourage verbalization of concerns/fears  - Identify coping mechanisms  - Assist in developing anxiety-reducing skills  - Administer/offer alternative therapies  - Limit or eliminate stimulants   Outcome: Progressing

## 2019-02-17 NOTE — PROGRESS NOTES
Pt calm, cooperative and medication compliant  Pt denies all s/s  Pt visible in milieu and social among peers  Will continue to monitor

## 2019-02-18 PROCEDURE — 99232 SBSQ HOSP IP/OBS MODERATE 35: CPT | Performed by: NURSE PRACTITIONER

## 2019-02-18 RX ADMIN — NICOTINE 14 MG: 14 PATCH, EXTENDED RELEASE TRANSDERMAL at 09:39

## 2019-02-18 RX ADMIN — PREDNISONE 5 MG: 5 TABLET ORAL at 09:40

## 2019-02-18 RX ADMIN — PANTOPRAZOLE SODIUM 20 MG: 20 TABLET, DELAYED RELEASE ORAL at 06:06

## 2019-02-18 RX ADMIN — LITHIUM CARBONATE 450 MG: 450 TABLET, EXTENDED RELEASE ORAL at 21:56

## 2019-02-18 RX ADMIN — QUETIAPINE FUMARATE 450 MG: 50 TABLET, FILM COATED ORAL at 21:56

## 2019-02-18 RX ADMIN — LEVOTHYROXINE SODIUM 125 MCG: 125 TABLET ORAL at 06:06

## 2019-02-18 RX ADMIN — THEOPHYLLINE ANHYDROUS 200 MG: 200 CAPSULE, EXTENDED RELEASE ORAL at 09:40

## 2019-02-18 RX ADMIN — ALBUTEROL SULFATE 2 PUFF: 90 AEROSOL, METERED RESPIRATORY (INHALATION) at 13:19

## 2019-02-18 RX ADMIN — QUETIAPINE 50 MG: 50 TABLET ORAL at 09:40

## 2019-02-18 RX ADMIN — ALBUTEROL SULFATE 2 PUFF: 90 AEROSOL, METERED RESPIRATORY (INHALATION) at 09:38

## 2019-02-18 RX ADMIN — LITHIUM CARBONATE EXTENDED-RELEASE TABLET 300 MG: 300 TABLET, FILM COATED, EXTENDED RELEASE ORAL at 09:40

## 2019-02-18 RX ADMIN — FLUTICASONE FUROATE AND VILANTEROL TRIFENATATE 1 PUFF: 200; 25 POWDER RESPIRATORY (INHALATION) at 09:38

## 2019-02-18 RX ADMIN — ALBUTEROL SULFATE 2 PUFF: 90 AEROSOL, METERED RESPIRATORY (INHALATION) at 19:38

## 2019-02-18 NOTE — PLAN OF CARE
Pt needed occasional redirection in groups to remain on topic rather to express details of her life off topic  She continues to blame her family and friends for stalking her  Pt with min  insight into her re: her role as a patient not a staff member during group discussions

## 2019-02-18 NOTE — PROGRESS NOTES
Pt demanding at times but redirectable  Good appetite and steady gait with rw  Med-compliant  Interacted with select peers and attended group  Monitored for safety and support

## 2019-02-18 NOTE — PLAN OF CARE
Problem: Alteration in Thoughts and Perception  Goal: Treatment Goal: Gain control of psychotic behaviors/thinking, reduce/eliminate presenting symptoms and demonstrate improved reality functioning upon discharge  Outcome: Progressing     Problem: Depression  Goal: Treatment Goal: Demonstrate behavioral control of depressive symptoms, verbalize feelings of improved mood/affect, and adopt new coping skills prior to discharge  Outcome: Progressing  Goal: Verbalize thoughts and feelings  Description  Interventions:  - Assess and re-assess patient's level of risk   - Engage patient in 1:1 interactions, daily, for a minimum of 15 minutes   - Encourage patient to express feelings, fears, frustrations, hopes    Outcome: Progressing  Goal: Refrain from harming self  Description  Interventions:  - Monitor patient closely, per order   - Supervise medication ingestion, monitor effects and side effects    Outcome: Progressing  Goal: Refrain from isolation  Description  Interventions:  - Develop a trusting relationship   - Encourage socialization    Outcome: Progressing  Goal: Refrain from self-neglect  Outcome: Progressing  Goal: Attend and participate in unit activities, including therapeutic, recreational, and educational groups  Description  Interventions:  - Provide therapeutic and educational activities daily, encourage attendance and participation, and document same in the medical record    Outcome: Progressing  Goal: Complete daily ADLs, including personal hygiene independently, as able  Description  Interventions:  - Observe, teach, and assist patient with ADLS  -  Monitor and promote a balance of rest/activity, with adequate nutrition and elimination    Outcome: Progressing     Problem: Anxiety  Goal: Anxiety is at manageable level  Description  Interventions:  - Assess and monitor patient's anxiety level     - Monitor for signs and symptoms of anxiety both physical and emotional (heart palpitations, chest pain, shortness of breath, headaches, nausea, feeling jumpy, restlessness, irritable, apprehensive)  - Collaborate with interdisciplinary team and initiate plan and interventions as ordered    - [place] patient to unit/surroundings  - Explain treatment plan  - Encourage participation in care  - Encourage verbalization of concerns/fears  - Identify coping mechanisms  - Assist in developing anxiety-reducing skills  - Administer/offer alternative therapies  - Limit or eliminate stimulants   Outcome: Progressing

## 2019-02-18 NOTE — MEDICAL STUDENT
Subjective:  SULEMAN is a 66year old female on hospital da 34 admitted on a 201 status for shizoaffective disorder, bipolar type  Her case when discussed in morning review  Per staff, she is less paranoid, calm and cooperative and medication compliant  The patient was pleasant on approach but irritated that her "meds were late" and thinks that "this must be some kind of predjudice"  She reports that today she is anxious bc she has be "listening to tapes from the police department and doctors talking about criminals" she notes that she hears them through the TV and in her bedroom and states that it is "a blessing to hear this because they know how to keep me safe"  She states she is also anxious because she moved rooms and that she is in a "dead zone" and the room is "set up for a left-handed person"  She continues to have delusions about women by the name of Ramesh Cruz and Claude Barba and is worried that staff is giving them information about her stay here  Patient reports good sleep and appetite, denies SI/HI or problems with medication  Objective:  Appearance: appropriate, pleasant   Mood: anxious  Affect: mood-congruent  Delusions: grandiose, persecutory  Hallucinations: auditory, without commands  SI/HI: denies  Oriented: x3  Speech: normal rate and volume, rambling    Motor: no abnormal movement noted  Gait: unsteady    Assessment/Plan:  1  Schizoaffective disorder, bipolar type  Continue lithium 300mg QD, 450mg QHS  Continue Seroquel 450mg QHS, 50mg QD  Continue occupational therapy  Follow up on placement at [facility] with case management       Jena HAYDEN

## 2019-02-18 NOTE — PROGRESS NOTES
Progress Note - Behavioral Health   Arvil Model 66 y o  female MRN: [id number]  Unit/Bed#: Stephon Osborne 260-69 Encounter: [id number]    The patient was seen for continuing care and reviewed with treatment team   Staff reports the patient has been calm, pleasant and cooperative  She has been social and bright  We are waiting for placement  The patient reports increased anxiety related to having to change rooms  She is also more anxious today because she has been having increased auditory hallucinations from the TV and in her bedroom  She is hearing tapes from the police department as well as doctors talking about criminals  However she feels "this is a blessing" to hear these things because "they know how to keep me safe"  Reports her appetite and sleep have been good  Denies SI  Has been less somatic  Mental Status Evaluation:  Appearance:  Good eye contact and disheveled   Behavior:  cooperative and friendly   Mood:  anxious   Affect: broad   Speech: rambling   Thought Process: Tangential   Thought Content:  Delusions of persecution   Perceptual Disturbances: Auditory hallucinations without commands   Risk Potential: No suicidal or homicidal ideation   Attention/Concentration [place] than expected   Orientation:   Oriented x3   Gait/Station:  needs assistive device   Motor Activity: No abnormal movement noted     Progress Toward Goals:  Approaching baseline    Assessment/Plan    Principal Problem:    Schizoaffective disorder, bipolar type (HCC)  Active Problems:    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HCC)    Tobacco use disorder, continuous    Chronic respiratory failure (HCC)    Acquired hypothyroidism    Gastroesophageal reflux disease without esophagitis    Abnormal CT of the chest    Excessive cerumen in left ear canal    Lipoma of right upper extremity      Recommended Treatment:     Continue lithium 300 mg daily and 450 mg HS  Continue Seroquel 50 mg daily and 450 mg HS        Continue with pharmacotherapy, group therapy, milieu therapy and occupational therapy  The patient will be maintained on the following medications:    Current Facility-Administered Medications:  acetaminophen 650 mg Oral Q6H PRN Kong Silver, CRNP   acetaminophen 650 mg Oral Q4H PRN Kong Silver, CRNP   acetaminophen 975 mg Oral Q6H PRN Kong Silver, CRNP   albuterol 2 puff Inhalation TID Umang Lin MD   aluminum-magnesium hydroxide-simethicone 30 mL Oral Q4H PRN Kong Silver, CRNP   benztropine 1 mg Intramuscular BID PRN Kong Silver, CRNP   benztropine 1 mg Oral BID PRN Kong Silver, CRNP   EPINEPHrine PF 0 15 mg Intramuscular PRN Teddy Orion, CRNP   fluticasone-vilanterol 1 puff Inhalation Daily Pia Hernandez MD   hydrOXYzine HCL 25 mg Oral Q6H PRN Kong Silver, CRNP   levalbuterol 1 25 mg Nebulization Q8H PRN Umang Lin MD   Or       sodium chloride 3 mL Nebulization Q8H PRN Umang Lin MD   levothyroxine 125 mcg Oral Daily [place] Orion, CRNP   lithium carbonate 300 mg Oral Daily Brenda Hawk, CRNP   lithium carbonate 450 mg Oral HS Brenda Hawk, CRNP   LORazepam 1 mg Intramuscular Q4H PRN Kong Silver, CRNP   LORazepam 0 5 mg Oral Q4H PRN Kong Silver, CRNP   magnesium hydroxide 30 mL Oral Daily PRN Kong Silver, CRNP   nicotine 14 mg Transdermal Daily [place] Orion, CRNP   nicotine polacrilex 2 mg Oral Q2H PRN Kong Silver, CRNP   pantoprazole 20 mg Oral Daily Vicky Ciminieri, CRNP   predniSONE 5 mg Oral Daily Vicky Ciminieri, CRNP   QUEtiapine 450 mg Oral HS Wildersania Choudhary, CRNP   QUEtiapine 50 mg Oral Daily Gladis Haines MD   theophylline 200 mg Oral Daily [place] Orion, CRNP   traZODone 25 mg Oral HS PRN Kong Silver, CRNP       Risks, benefits and possible side effects of Medications:   Patient does not verbalize understanding at this time and will require further explanation

## 2019-02-18 NOTE — CASE MANAGEMENT
Email received from Jeris Fowler [facility] of [employer] stating that regulations dictate that patient would have to have 2 spare oxygen tanks in case of power failure and [employer] does not have space to store two spare oxygen tanks  They are unable to accept patient at this time  Writer reached out to SAILAJA Lew and Valentina Li of [facility] requesting suggestions/guidance

## 2019-02-19 PROCEDURE — 99232 SBSQ HOSP IP/OBS MODERATE 35: CPT | Performed by: NURSE PRACTITIONER

## 2019-02-19 RX ADMIN — QUETIAPINE FUMARATE 450 MG: 50 TABLET, FILM COATED ORAL at 21:40

## 2019-02-19 RX ADMIN — LITHIUM CARBONATE EXTENDED-RELEASE TABLET 300 MG: 300 TABLET, FILM COATED, EXTENDED RELEASE ORAL at 08:40

## 2019-02-19 RX ADMIN — QUETIAPINE 50 MG: 50 TABLET ORAL at 08:39

## 2019-02-19 RX ADMIN — THEOPHYLLINE ANHYDROUS 200 MG: 200 CAPSULE, EXTENDED RELEASE ORAL at 08:39

## 2019-02-19 RX ADMIN — NICOTINE 14 MG: 14 PATCH, EXTENDED RELEASE TRANSDERMAL at 08:40

## 2019-02-19 RX ADMIN — PANTOPRAZOLE SODIUM 20 MG: 20 TABLET, DELAYED RELEASE ORAL at 06:25

## 2019-02-19 RX ADMIN — PREDNISONE 5 MG: 5 TABLET ORAL at 08:39

## 2019-02-19 RX ADMIN — ALBUTEROL SULFATE 2 PUFF: 90 AEROSOL, METERED RESPIRATORY (INHALATION) at 19:34

## 2019-02-19 RX ADMIN — ALBUTEROL SULFATE 2 PUFF: 90 AEROSOL, METERED RESPIRATORY (INHALATION) at 13:45

## 2019-02-19 RX ADMIN — LITHIUM CARBONATE 450 MG: 450 TABLET, EXTENDED RELEASE ORAL at 21:37

## 2019-02-19 RX ADMIN — FLUTICASONE FUROATE AND VILANTEROL TRIFENATATE 1 PUFF: 200; 25 POWDER RESPIRATORY (INHALATION) at 08:40

## 2019-02-19 RX ADMIN — LEVOTHYROXINE SODIUM 125 MCG: 125 TABLET ORAL at 06:25

## 2019-02-19 RX ADMIN — ALBUTEROL SULFATE 2 PUFF: 90 AEROSOL, METERED RESPIRATORY (INHALATION) at 08:39

## 2019-02-19 NOTE — MEDICAL STUDENT
Subjective:  SULEMAN is a 66year old female on hospital day 35 admitted on a 201 status for schizoaffective disorder bipolar type  Her case was discussed in morning report with the staff  Per nursing, patient was paranoid about her family stalking her however she is medication compliant  Per CHRISTOPHER, patient needs occasional redirection in group   The patient was pleasant on approach this morning  She states that she is less anxious today because Dr Armida Hatfield is back today  She feels as though her gait is better today but she doesn't "want staff to take for granted that she is walking on her own"  She reports however, yesterday she was "down in the dumps" because "Officer Josh Thomas is in love with me " and saying that she was hearing her son wrote very nice things about her  She continues to mention women by the name of Alirio Batres and Demarcus Roth and states they are trying to find out her discharge plans  She reports she is eating and sleeping well, denies SI/HI and is oriented times 3  There were not recent labs to review  Objective:    Appearance: pleasant,  Friendly    Mood: anxious  Affect: mood-congruent  Delusions: grandiose  Hallucinations: auditory, without commands  SI/HI: denies  Oriented: x3  Thoughts: tangential  Speech: normal rate and volume, rambling    Motor: no abnormal movement noted  Gait: unsteady,  Uses O2 to steady herself    Assessment/Plan:  1   Schizoaffective Disorder, Bipolar type- Lithium 300mg QD, 450mg bedtime  Continue Seroquel 50mg QD, 450mg bedtime  Continue occupational therapy, monitor behavior  Continue to work with case management on placement        Dick Epley PA-S

## 2019-02-19 NOTE — CASE MANAGEMENT
CM made referral to [facility] BJORN Méndez (f: [phone]), will await response  CM to follow and support as needed

## 2019-02-19 NOTE — PLAN OF CARE
Pt actively participated in all three groups today  She demonstrated a focused and bright demeanor all morning until her sister visited with candy and a plant  Pt accuses her sister of poisoning her and trying to put up a false front of support for pt  Jl Heredia

## 2019-02-19 NOTE — PROGRESS NOTES
Progress Note - Behavioral Health   Boneblaine Mcqueenquest 66 y o  female MRN: [id number]  Unit/Bed#: Freddie Liao 748-96 Encounter: [id number]    The patient was seen for continuing care and reviewed with treatment team   Staff reports the patient has been social and visible  She reports depression and anxiety at times mostly related to her room change  Has been less somatic  We are waiting for placement  The patient states she is less anxious today  She said that she felt down in the dumps yesterday but felt better after her heard her son's voice saying nice tings about her  Sleep and appetite have been good  No SI  More spontaneous with delusional material today most likely related to feeling anxious about leaving here  Spoke about how she is in the witness protection program and she has been hearing tapes outlining a police conspiracy as well as a murder and different love affairs  She said she has "grave concerns" about the safety of the transportation to [facility]  She is worried about these 4 people who are after her and trying to find out where she is and where she is going  Mental Status Evaluation:  Appearance:  Good eye contact and disheveled   Behavior:  cooperative and friendly   Mood:  anxious   Affect: appropriate   Speech: rambling   Thought Process: Tangential   Thought Content:  Paranoid and mistrustful, Delusions of persecution and Grandiose delusions   Perceptual Disturbances:  Auditory hallucinations without commands   Risk Potential: No suicidal or homicidal ideation   Attention/Concentration [place] than expected   Orientation:   Oriented x3   Gait/Station:  needs assistive device   Motor Activity: No abnormal movement noted     Progress Toward Goals:  No change    Assessment/Plan    Principal Problem:    Schizoaffective disorder, bipolar type ([facility])  Active Problems:    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[facility])    Tobacco use disorder, continuous    Chronic respiratory failure ([facility] [address] )    Acquired hypothyroidism    Gastroesophageal reflux disease without esophagitis    Abnormal CT of the chest    Excessive cerumen in left ear canal    Lipoma of right upper extremity      Recommended Treatment:     Continue lithium 300 mg q a m  and 450 mg H S  Continue Seroquel 50 mg q a m  and 450 mg HS  Continue with pharmacotherapy, group therapy, milieu therapy and occupational therapy    The patient will be maintained on the following medications:    Current Facility-Administered Medications:  acetaminophen 650 mg Oral Q6H PRN Renelda Conch, CRNP   acetaminophen 650 mg Oral Q4H PRN Renelda Conch, CRNP   acetaminophen 975 mg Oral Q6H PRN Renelda Conch, CRNP   albuterol 2 puff Inhalation TID Judith Saravia MD   aluminum-magnesium hydroxide-simethicone 30 mL Oral Q4H PRN Renelda Conch, CRNP   benztropine 1 mg Intramuscular BID PRN Renelda Conch, CRNP   benztropine 1 mg Oral BID PRN Renelda Conch, CRNP   EPINEPHrine PF 0 15 mg Intramuscular PRN Pearla Flow, CRNP   fluticasone-vilanterol 1 puff Inhalation Daily Ruddy Mejia MD   hydrOXYzine HCL 25 mg Oral Q6H PRN Renelda Conch, CRNP   levalbuterol 1 25 mg Nebulization Q8H PRN Judith Saravia MD   Or       sodium chloride 3 mL Nebulization Q8H PRN Judith Saravia MD   levothyroxine 125 mcg Oral Daily Pearla Flow, CRNP   lithium carbonate 300 mg Oral Daily Latisha Lr, CRNP   lithium carbonate 450 mg Oral HS Latisha Lr, CRNP   LORazepam 1 mg Intramuscular Q4H PRN Renelda Conch, CRNP   LORazepam 0 5 mg Oral Q4H PRN Renelda Conch, CRNP   magnesium hydroxide 30 mL Oral Daily PRN Renelda Conch, CRNP   nicotine 14 mg Transdermal Daily Pearla Flow, CRNP   nicotine polacrilex 2 mg Oral Q2H PRN Renelda Conch, CRNP   pantoprazole 20 mg Oral Daily Vicky Ciminieri, CRNP   predniSONE 5 mg Oral Daily Vicky Ciminieri, CRNP   QUEtiapine 450 mg Oral HS Magaly Chaudhry, CRNP   QUEtiapine 50 mg Oral Daily Zia Magana MD   theophylline 200 mg Oral Daily ABILIO Nguyen   traZODone 25 mg Oral HS PRN ABILIO Gary       Risks, benefits and possible side effects of Medications:   Patient does not verbalize understanding at this time and will require further explanation

## 2019-02-19 NOTE — PROGRESS NOTES
Pt calm, cooperative and medication compliant  Pt reports anxiety d/t her sister's visit  Pt stated "I feel she put something in the lotion that she gave me and possibly injected the chocolate with something that she brought in for me"  Reassured pt but pt stated "I am going to throw it all in the garbage"  Pt denies all other symptoms  Pt visible in milieu and social   Will continue to monitor

## 2019-02-19 NOTE — PLAN OF CARE
Problem: Alteration in Thoughts and Perception  Goal: Treatment Goal: Gain control of psychotic behaviors/thinking, reduce/eliminate presenting symptoms and demonstrate improved reality functioning upon discharge  Outcome: Progressing     Problem: Depression  Goal: Treatment Goal: Demonstrate behavioral control of depressive symptoms, verbalize feelings of improved mood/affect, and adopt new coping skills prior to discharge  Outcome: Progressing  Goal: Verbalize thoughts and feelings  Description  Interventions:  - Assess and re-assess patient's level of risk   - Engage patient in 1:1 interactions, daily, for a minimum of 15 minutes   - Encourage patient to express feelings, fears, frustrations, hopes    Outcome: Progressing  Goal: Refrain from harming self  Description  Interventions:  - Monitor patient closely, per order   - Supervise medication ingestion, monitor effects and side effects    Outcome: Progressing  Goal: Refrain from isolation  Description  Interventions:  - Develop a trusting relationship   - Encourage socialization    Outcome: Progressing  Goal: Refrain from self-neglect  Outcome: Progressing  Goal: Attend and participate in unit activities, including therapeutic, recreational, and educational groups  Description  Interventions:  - Provide therapeutic and educational activities daily, encourage attendance and participation, and document same in the medical record    Outcome: Progressing  Goal: Complete daily ADLs, including personal hygiene independently, as able  Description  Interventions:  - Observe, teach, and assist patient with ADLS  -  Monitor and promote a balance of rest/activity, with adequate nutrition and elimination    Outcome: Progressing     Problem: Anxiety  Goal: Anxiety is at manageable level  Description  Interventions:  - Assess and monitor patient's anxiety level     - Monitor for signs and symptoms of anxiety both physical and emotional (heart palpitations, chest pain, shortness of breath, headaches, nausea, feeling jumpy, restlessness, irritable, apprehensive)  - Collaborate with interdisciplinary team and initiate plan and interventions as ordered    - [place] patient to unit/surroundings  - Explain treatment plan  - Encourage participation in care  - Encourage verbalization of concerns/fears  - Identify coping mechanisms  - Assist in developing anxiety-reducing skills  - Administer/offer alternative therapies  - Limit or eliminate stimulants   Outcome: Progressing

## 2019-02-19 NOTE — PLAN OF CARE
Problem: Alteration in Thoughts and Perception  Goal: Treatment Goal: Gain control of psychotic behaviors/thinking, reduce/eliminate presenting symptoms and demonstrate improved reality functioning upon discharge  Outcome: Progressing     Problem: Depression  Goal: Treatment Goal: Demonstrate behavioral control of depressive symptoms, verbalize feelings of improved mood/affect, and adopt new coping skills prior to discharge  Outcome: Progressing  Goal: Verbalize thoughts and feelings  Description  Interventions:  - Assess and re-assess patient's level of risk   - Engage patient in 1:1 interactions, daily, for a minimum of 15 minutes   - Encourage patient to express feelings, fears, frustrations, hopes    Outcome: Progressing  Goal: Refrain from harming self  Description  Interventions:  - Monitor patient closely, per order   - Supervise medication ingestion, monitor effects and side effects    Outcome: Progressing  Goal: Refrain from isolation  Description  Interventions:  - Develop a trusting relationship   - Encourage socialization    Outcome: Progressing  Goal: Refrain from self-neglect  Outcome: Progressing  Goal: Attend and participate in unit activities, including therapeutic, recreational, and educational groups  Description  Interventions:  - Provide therapeutic and educational activities daily, encourage attendance and participation, and document same in the medical record    Outcome: Progressing  Goal: Complete daily ADLs, including personal hygiene independently, as able  Description  Interventions:  - Observe, teach, and assist patient with ADLS  -  Monitor and promote a balance of rest/activity, with adequate nutrition and elimination    Outcome: Progressing     Problem: Anxiety  Goal: Anxiety is at manageable level  Description  Interventions:  - Assess and monitor patient's anxiety level     - Monitor for signs and symptoms of anxiety both physical and emotional (heart palpitations, chest pain, shortness of breath, headaches, nausea, feeling jumpy, restlessness, irritable, apprehensive)  - Collaborate with interdisciplinary team and initiate plan and interventions as ordered    - [place] patient to unit/surroundings  - Explain treatment plan  - Encourage participation in care  - Encourage verbalization of concerns/fears  - Identify coping mechanisms  - Assist in developing anxiety-reducing skills  - Administer/offer alternative therapies  - Limit or eliminate stimulants   Outcome: Progressing     Problem: DISCHARGE PLANNING  Goal: Discharge to home or other facility with appropriate resources  Description  INTERVENTIONS:  - Identify barriers to discharge w/patient and caregiver  - Arrange for needed discharge resources and transportation as appropriate  - Identify discharge learning needs (meds, wound care, etc )  - Arrange for interpretive services to assist at discharge as needed  - Refer to Case Management Department for coordinating discharge planning if the patient needs post-hospital services based on physician/advanced practitioner order or complex needs related to functional status, cognitive ability, or social support system   Outcome: Progressing     Problem: Ineffective Coping  Goal: Participates in unit activities  Description  Interventions:  - Provide therapeutic environment   - Provide required programming   - Redirect inappropriate behaviors    Outcome: Progressing

## 2019-02-19 NOTE — PROGRESS NOTES
Awake, alert, oriented  Cooperative on approach  Medication compliant  Pt with no complaints at this time  Will continue to monitor

## 2019-02-19 NOTE — PROGRESS NOTES
Pt visible in milieu social among staff and peers  Pt compliant with meal and medication regime  Pt reports depression  Denies all other s/s

## 2019-02-19 NOTE — PROGRESS NOTES
Pt angry about sister coming to visit  Pt voicing paranoid delusions about sister trying to kill her "many times" as well as bugging the plants on the unit  Pt redirected and calmed at this time  Will continue to monitor

## 2019-02-20 PROCEDURE — 99232 SBSQ HOSP IP/OBS MODERATE 35: CPT | Performed by: PSYCHIATRY & NEUROLOGY

## 2019-02-20 RX ADMIN — THEOPHYLLINE ANHYDROUS 200 MG: 200 CAPSULE, EXTENDED RELEASE ORAL at 08:44

## 2019-02-20 RX ADMIN — LITHIUM CARBONATE 450 MG: 450 TABLET, EXTENDED RELEASE ORAL at 21:54

## 2019-02-20 RX ADMIN — ALBUTEROL SULFATE 2 PUFF: 90 AEROSOL, METERED RESPIRATORY (INHALATION) at 13:44

## 2019-02-20 RX ADMIN — FLUTICASONE FUROATE AND VILANTEROL TRIFENATATE 1 PUFF: 200; 25 POWDER RESPIRATORY (INHALATION) at 08:50

## 2019-02-20 RX ADMIN — LEVOTHYROXINE SODIUM 125 MCG: 125 TABLET ORAL at 06:06

## 2019-02-20 RX ADMIN — ALBUTEROL SULFATE 2 PUFF: 90 AEROSOL, METERED RESPIRATORY (INHALATION) at 08:47

## 2019-02-20 RX ADMIN — QUETIAPINE 50 MG: 50 TABLET ORAL at 08:44

## 2019-02-20 RX ADMIN — LITHIUM CARBONATE EXTENDED-RELEASE TABLET 300 MG: 300 TABLET, FILM COATED, EXTENDED RELEASE ORAL at 08:44

## 2019-02-20 RX ADMIN — ALBUTEROL SULFATE 2 PUFF: 90 AEROSOL, METERED RESPIRATORY (INHALATION) at 19:43

## 2019-02-20 RX ADMIN — PREDNISONE 5 MG: 5 TABLET ORAL at 08:44

## 2019-02-20 RX ADMIN — PANTOPRAZOLE SODIUM 20 MG: 20 TABLET, DELAYED RELEASE ORAL at 06:05

## 2019-02-20 RX ADMIN — QUETIAPINE FUMARATE 450 MG: 50 TABLET, FILM COATED ORAL at 21:54

## 2019-02-20 RX ADMIN — NICOTINE 14 MG: 14 PATCH, EXTENDED RELEASE TRANSDERMAL at 08:47

## 2019-02-20 NOTE — PLAN OF CARE
Problem: Alteration in Thoughts and Perception  Goal: Treatment Goal: Gain control of psychotic behaviors/thinking, reduce/eliminate presenting symptoms and demonstrate improved reality functioning upon discharge  Outcome: Progressing     Problem: Depression  Goal: Treatment Goal: Demonstrate behavioral control of depressive symptoms, verbalize feelings of improved mood/affect, and adopt new coping skills prior to discharge  Outcome: Progressing  Goal: Verbalize thoughts and feelings  Description  Interventions:  - Assess and re-assess patient's level of risk   - Engage patient in 1:1 interactions, daily, for a minimum of 15 minutes   - Encourage patient to express feelings, fears, frustrations, hopes    Outcome: Progressing  Goal: Refrain from harming self  Description  Interventions:  - Monitor patient closely, per order   - Supervise medication ingestion, monitor effects and side effects    Outcome: Progressing  Goal: Refrain from isolation  Description  Interventions:  - Develop a trusting relationship   - Encourage socialization    Outcome: Progressing  Goal: Refrain from self-neglect  Outcome: Progressing  Goal: Attend and participate in unit activities, including therapeutic, recreational, and educational groups  Description  Interventions:  - Provide therapeutic and educational activities daily, encourage attendance and participation, and document same in the medical record    Outcome: Progressing  Goal: Complete daily ADLs, including personal hygiene independently, as able  Description  Interventions:  - Observe, teach, and assist patient with ADLS  -  Monitor and promote a balance of rest/activity, with adequate nutrition and elimination    Outcome: Progressing     Problem: Anxiety  Goal: Anxiety is at manageable level  Description  Interventions:  - Assess and monitor patient's anxiety level     - Monitor for signs and symptoms of anxiety both physical and emotional (heart palpitations, chest pain, shortness of breath, headaches, nausea, feeling jumpy, restlessness, irritable, apprehensive)  - Collaborate with interdisciplinary team and initiate plan and interventions as ordered    - [place] patient to unit/surroundings  - Explain treatment plan  - Encourage participation in care  - Encourage verbalization of concerns/fears  - Identify coping mechanisms  - Assist in developing anxiety-reducing skills  - Administer/offer alternative therapies  - Limit or eliminate stimulants   Outcome: Progressing

## 2019-02-20 NOTE — PLAN OF CARE
Problem: Alteration in Thoughts and Perception  Goal: Treatment Goal: Gain control of psychotic behaviors/thinking, reduce/eliminate presenting symptoms and demonstrate improved reality functioning upon discharge  Outcome: Progressing     Problem: Depression  Goal: Treatment Goal: Demonstrate behavioral control of depressive symptoms, verbalize feelings of improved mood/affect, and adopt new coping skills prior to discharge  Outcome: Progressing  Goal: Verbalize thoughts and feelings  Description  Interventions:  - Assess and re-assess patient's level of risk   - Engage patient in 1:1 interactions, daily, for a minimum of 15 minutes   - Encourage patient to express feelings, fears, frustrations, hopes    Outcome: Progressing  Goal: Refrain from harming self  Description  Interventions:  - Monitor patient closely, per order   - Supervise medication ingestion, monitor effects and side effects    Outcome: Progressing  Goal: Refrain from isolation  Description  Interventions:  - Develop a trusting relationship   - Encourage socialization    Outcome: Progressing  Goal: Refrain from self-neglect  Outcome: Progressing  Goal: Attend and participate in unit activities, including therapeutic, recreational, and educational groups  Description  Interventions:  - Provide therapeutic and educational activities daily, encourage attendance and participation, and document same in the medical record    Outcome: Progressing  Goal: Complete daily ADLs, including personal hygiene independently, as able  Description  Interventions:  - Observe, teach, and assist patient with ADLS  -  Monitor and promote a balance of rest/activity, with adequate nutrition and elimination    Outcome: Progressing     Problem: Anxiety  Goal: Anxiety is at manageable level  Description  Interventions:  - Assess and monitor patient's anxiety level     - Monitor for signs and symptoms of anxiety both physical and emotional (heart palpitations, chest pain, shortness of breath, headaches, nausea, feeling jumpy, restlessness, irritable, apprehensive)  - Collaborate with interdisciplinary team and initiate plan and interventions as ordered    - [place] patient to unit/surroundings  - Explain treatment plan  - Encourage participation in care  - Encourage verbalization of concerns/fears  - Identify coping mechanisms  - Assist in developing anxiety-reducing skills  - Administer/offer alternative therapies  - Limit or eliminate stimulants   Outcome: Progressing

## 2019-02-20 NOTE — PROGRESS NOTES
Pt pleasant and bright  Visible in milieu all evening  Expressed some paranoid thoughts regarding chocollate brought by sister  Became quite obsessive about fearing that it was laced with poison  Pt agreed to discard candy to prevent any further issues  Pt denies all symptoms including SI/HI

## 2019-02-20 NOTE — CASE MANAGEMENT
Left  for sent follow-up email to [facility] [phone], [email or web address]  com to get update on [facility] referral    Return email from Markel stating that referral was received and forwarded to [facility] staff for review

## 2019-02-20 NOTE — PROGRESS NOTES
Patient calm, cooperative and compliant with medications  Said she was disturbed with her sleep due to roommate up a couple times during the night  Denies all symptoms  Present in the milieu for breakfast  Attended am group  1109: Patient wanted to rescind VERNA for Cruz Houston (sister) and Flako Tena (brother)  Had patient sign VERNA to not release information to them  She does not want them to visit  Treatment team note was updated to reflect this information

## 2019-02-20 NOTE — MEDICAL STUDENT
MED STUDENT NOTE    Subjective:    SULEMAN is a 66year old female on hospital day 39 admitted on a 201 status for Schizoaffective d/o, bipolar type  Her case was discussed this morning in case review  Per staff, she was very upset about the visit from her stepsister, stating that the chocolate she brought was 'poisoned'  The patient states she  Was upset about the visit with her step sister, stating she "tried to seduce me with candy and a plant that was poisoned"  She is ruminating over all her sister did to her, stating that "she wants to be me so badly"  She remains somatic, complaining last night that she "could not swallow", which self-resolved  Afterwards, her ear started bothering her  The patient would like to shower and is requesting a basin bath because the shower does not have railings  She states appetite has been okay but her sleep was somewhat disturbed by her roommate waking up in the middle of the night  She denies SI/HI  Objective:    Appearance:  disheveled  Mood: upset   Affect: mood-congruent  Delusions: grandiose  Hallucinations: auditory, without commands  SI/HI: denies  Oriented: x3  Thoughts: tangential  Speech: normal rate and volume, rambling    Motor: no abnormal movement noted    Gait: unsteady,  Uses O2 to steady herself      Assessment/Plan:  1   Schizoaffective Disorder, bipolar type  Continue medications as prescribed  Continue occupational therapy  Follow up with case management on placement        Mirta HAYDEN

## 2019-02-20 NOTE — PROGRESS NOTES
Progress Note - Behavioral Health   [facility] 66 y o  female MRN: [id number]  Unit/Bed#: Rollen Hodgkin 065-41 Encounter: [id number]    The patient was seen for continuing care and reviewed with treatment team   Tolerating meds  EKG stable  Preoccupied with her living situation  Mental Status Evaluation:  Appearance:  Adequate hygiene and grooming and Good eye contact   Behavior:  calm, cooperative and friendly   Mood:  improving   Thought Content:  Paranoid and mistrustful   Perceptual Disturbances: Denies hallucinations and does not appear to be responding to internal stimuli   Risk Potential: No suicidal or homicidal ideation   Orientation:   A+O x 3         Assessment/Plan    Principal Problem:    Schizoaffective disorder, bipolar type (HCC)      Recommended Treatment:Will recheck  Lithium level  Continue with pharmacotherapy, group therapy, milieu therapy and occupational therapy    The patient will be maintained on the following medications:    Current Facility-Administered Medications:  acetaminophen 650 mg Oral Q6H PRN Colie Rouleau, CRNP   acetaminophen 650 mg Oral Q4H PRN Colie Rouleau, CRNP   acetaminophen 975 mg Oral Q6H PRN Colie Rouleau, CRNP   albuterol 2 puff Inhalation TID Fox Sequeira MD   aluminum-magnesium hydroxide-simethicone 30 mL Oral Q4H PRN Colie Rouleau, CRNP   benztropine 1 mg Intramuscular BID PRN Colie Rouleau, CRNP   benztropine 1 mg Oral BID PRN Colie Rouleau, CRNP   EPINEPHrine PF 0 15 mg Intramuscular PRN Sol Anderson, CRNP   fluticasone-vilanterol 1 puff Inhalation Daily Kaycee Blanchard MD   hydrOXYzine HCL 25 mg Oral Q6H PRN Colie Rouleau, CRNP   levalbuterol 1 25 mg Nebulization Q8H PRN Fox Sequeira MD   Or       sodium chloride 3 mL Nebulization Q8H PRN Fox Sequeira MD   levothyroxine 125 mcg Oral Daily Vicky Ciminieri, CRNP   lithium carbonate 300 mg Oral Daily Helane Corpus, CRNP   lithium carbonate 450 mg Oral HS Helane Corpus, CRNP   LORazepam 1 mg Intramuscular Q4H PRN Nazanin Connelly, ABILIO   LORazepam 0 5 mg Oral Q4H PRN Nazanin Connelly, ABILIO   magnesium hydroxide 30 mL Oral Daily PRN Nazanin Connelly, ABILIO   nicotine 14 mg Transdermal Daily Vicky Ciminifaisal, ABILIO   nicotine polacrilex 2 mg Oral Q2H PRN Nazanin Connelly, ABILIO   pantoprazole 20 mg Oral Daily Vicky Ciminifaisal, ABILIO   predniSONE 5 mg Oral Daily Vicky Ciminieri, ABILIO   QUEtiapine 450 mg Oral HS Aster Burden, ABILIO   QUEtiapine 50 mg Oral Daily Belen Ohara MD   theophylline 200 mg Oral Daily Meli Newby, ABILIO   traZODone 25 mg Oral HS PRN ABILIO Hutchison

## 2019-02-21 PROCEDURE — 99232 SBSQ HOSP IP/OBS MODERATE 35: CPT | Performed by: NURSE PRACTITIONER

## 2019-02-21 RX ADMIN — THEOPHYLLINE ANHYDROUS 200 MG: 200 CAPSULE, EXTENDED RELEASE ORAL at 08:40

## 2019-02-21 RX ADMIN — QUETIAPINE FUMARATE 450 MG: 50 TABLET, FILM COATED ORAL at 21:58

## 2019-02-21 RX ADMIN — NICOTINE 14 MG: 14 PATCH, EXTENDED RELEASE TRANSDERMAL at 08:39

## 2019-02-21 RX ADMIN — LEVOTHYROXINE SODIUM 125 MCG: 125 TABLET ORAL at 06:19

## 2019-02-21 RX ADMIN — FLUTICASONE FUROATE AND VILANTEROL TRIFENATATE 1 PUFF: 200; 25 POWDER RESPIRATORY (INHALATION) at 08:41

## 2019-02-21 RX ADMIN — QUETIAPINE 50 MG: 50 TABLET ORAL at 08:40

## 2019-02-21 RX ADMIN — ALBUTEROL SULFATE 2 PUFF: 90 AEROSOL, METERED RESPIRATORY (INHALATION) at 13:34

## 2019-02-21 RX ADMIN — LITHIUM CARBONATE EXTENDED-RELEASE TABLET 300 MG: 300 TABLET, FILM COATED, EXTENDED RELEASE ORAL at 10:10

## 2019-02-21 RX ADMIN — PREDNISONE 5 MG: 5 TABLET ORAL at 08:40

## 2019-02-21 RX ADMIN — ALBUTEROL SULFATE 2 PUFF: 90 AEROSOL, METERED RESPIRATORY (INHALATION) at 19:22

## 2019-02-21 RX ADMIN — ALBUTEROL SULFATE 2 PUFF: 90 AEROSOL, METERED RESPIRATORY (INHALATION) at 08:37

## 2019-02-21 RX ADMIN — LITHIUM CARBONATE 450 MG: 450 TABLET, EXTENDED RELEASE ORAL at 21:59

## 2019-02-21 RX ADMIN — PANTOPRAZOLE SODIUM 20 MG: 20 TABLET, DELAYED RELEASE ORAL at 06:19

## 2019-02-21 NOTE — PLAN OF CARE
Pt continues to be willing to engage in structured groups yet struggles with following group instructions  She needs redirection away from judgemental comments about others

## 2019-02-21 NOTE — PROGRESS NOTES
Progress Note - Behavioral Health   Violetta Cage 66 y o  female MRN: [id number]  Unit/Bed#: Kevin Weinstein 217-32 Encounter: [id number]    The patient was seen for continuing care and reviewed with treatment team   Staff reports the patient has been social and visible  Last night she was expressing increased delusional and paranoid thoughts around midnight  A lithium level had been ordered for this morning but staff had trouble obtaining the specimen and the patient would not cooperate with another attempt to draw  The patient reports increased anxiety due to the auditory hallucinations she had last night of her family putting her in danger and plotting to murder her  She thinks they are trying to find out when she is leaving and where she is going so that they can kill her  She is also upset about a blood draw she had this morning where she feels that the needle was left in too long and it was painful  Sleep has been fine once she falls asleep and appetite has been normal   Denies SI/  HI  She has been focused and appropriate in the group setting  Mental Status Evaluation:  Appearance:  Good eye contact and disheveled   Behavior:  calm, cooperative and friendly   Mood:  anxious   Affect: appropriate   Speech: rambling   Thought Process: Tangential   Thought Content:  Delusions of persecution   Perceptual Disturbances: Auditory hallucinations without commands   Risk Potential: No suicidal or homicidal ideation   Attention/Concentration [place] than expected   Orientation:   Oriented x3   Gait/Station:  needs assistive device   Motor Activity: No abnormal movement noted     Progress Toward Goals:  No change    Assessment/Plan    Principal Problem:    Schizoaffective disorder, bipolar type ([address] )      Recommended Treatment: We will attempt to get a lithium level again tomorrow  Continue with pharmacotherapy, group therapy, milieu therapy and occupational therapy    The patient will be maintained on the following medications:    Current Facility-Administered Medications:  acetaminophen 650 mg Oral Q6H PRN Duwaine Maffucci, CRNP   acetaminophen 650 mg Oral Q4H PRN Duwaine Maffucci, CRNP   acetaminophen 975 mg Oral Q6H PRN Duwaine Maffucci, CRNP   albuterol 2 puff Inhalation TID Jerome Rey MD   aluminum-magnesium hydroxide-simethicone 30 mL Oral Q4H PRN Duwaine Maffucci, CRNP   benztropine 1 mg Intramuscular BID PRN Duwaine Maffucci, CRNP   benztropine 1 mg Oral BID PRN Duwaine Maffucci, CRNP   EPINEPHrine PF 0 15 mg Intramuscular PRN Donelda Josué, CRNP   fluticasone-vilanterol 1 puff Inhalation Daily Pedro Hernandez MD   hydrOXYzine HCL 25 mg Oral Q6H PRN Duwaine Maffucci, CRNP   levalbuterol 1 25 mg Nebulization Q8H PRN Jerome Rey MD   Or       sodium chloride 3 mL Nebulization Q8H PRN Jerome Rey MD   levothyroxine 125 mcg Oral Daily Vicky Ciminieri, CRNP   lithium carbonate 300 mg Oral Daily Grant Dura, CRNP   lithium carbonate 450 mg Oral HS Grant Dura, CRNP   LORazepam 1 mg Intramuscular Q4H PRN Duwaine Maffucci, CRNP   LORazepam 0 5 mg Oral Q4H PRN Duwaine Maffucci, CRNP   magnesium hydroxide 30 mL Oral Daily PRN Duwaine Maffucci, CRNP   nicotine 14 mg Transdermal Daily Donelda Josué, CRNP   nicotine polacrilex 2 mg Oral Q2H PRN Duwaine Maffucci, CRNP   pantoprazole 20 mg Oral Daily Vicky Ciminieri, CRNP   predniSONE 5 mg Oral Daily Vicky Ciminieri, CRNP   QUEtiapine 450 mg Oral HS Aparna Au, CRNP   QUEtiapine 50 mg Oral Daily Boo Rodriguez MD   theophylline 200 mg Oral Daily Donelda Jousé, CRNP   traZODone 25 mg Oral HS PRN Duwaine Maffucci, CRNP       Risks, benefits and possible side effects of Medications:   Patient does not verbalize understanding at this time and will require further explanation

## 2019-02-21 NOTE — PROGRESS NOTES
Pt visible in milieu socializing appropriately among staff and peers  Pt continues with continuous O2 3L via nasal cannula  Pt compliant with meal and medication regime  Pt denies all s/s at this time

## 2019-02-21 NOTE — PROGRESS NOTES
Patient awake sitting up in bed  Paranoid and delusional, disorganized and loud  States her sister and Robert Meade send money to poor people who are starving in [facility] all because they want people to think they are good  She states she found out last night she owns a home in [place] and she is worth more dead than alive so her sister is trying to poison her  She states that respiratory is putting sodium pentathol in her treatments in an attempt to help her sister  She then started talking about [address] and she knows what happens there  She said she "heard a lot of things" last night and wants reassurance she will be watched tonight  Patient reassured and encouraged to try to sleep  Patient resting quietly but talking softly to herself intermittently

## 2019-02-21 NOTE — PLAN OF CARE
Problem: Alteration in Thoughts and Perception  Goal: Treatment Goal: Gain control of psychotic behaviors/thinking, reduce/eliminate presenting symptoms and demonstrate improved reality functioning upon discharge  Outcome: Progressing     Problem: Depression  Goal: Treatment Goal: Demonstrate behavioral control of depressive symptoms, verbalize feelings of improved mood/affect, and adopt new coping skills prior to discharge  Outcome: Progressing  Goal: Verbalize thoughts and feelings  Description  Interventions:  - Assess and re-assess patient's level of risk   - Engage patient in 1:1 interactions, daily, for a minimum of 15 minutes   - Encourage patient to express feelings, fears, frustrations, hopes    Outcome: Progressing  Goal: Refrain from harming self  Description  Interventions:  - Monitor patient closely, per order   - Supervise medication ingestion, monitor effects and side effects    Outcome: Progressing  Goal: Refrain from isolation  Description  Interventions:  - Develop a trusting relationship   - Encourage socialization    Outcome: Progressing  Goal: Refrain from self-neglect  Outcome: Progressing  Goal: Attend and participate in unit activities, including therapeutic, recreational, and educational groups  Description  Interventions:  - Provide therapeutic and educational activities daily, encourage attendance and participation, and document same in the medical record    Outcome: Progressing  Goal: Complete daily ADLs, including personal hygiene independently, as able  Description  Interventions:  - Observe, teach, and assist patient with ADLS  -  Monitor and promote a balance of rest/activity, with adequate nutrition and elimination    Outcome: Progressing     Problem: Anxiety  Goal: Anxiety is at manageable level  Description  Interventions:  - Assess and monitor patient's anxiety level     - Monitor for signs and symptoms of anxiety both physical and emotional (heart palpitations, chest pain, shortness of breath, headaches, nausea, feeling jumpy, restlessness, irritable, apprehensive)  - Collaborate with interdisciplinary team and initiate plan and interventions as ordered    - [place] patient to unit/surroundings  - Explain treatment plan  - Encourage participation in care  - Encourage verbalization of concerns/fears  - Identify coping mechanisms  - Assist in developing anxiety-reducing skills  - Administer/offer alternative therapies  - Limit or eliminate stimulants   Outcome: Progressing

## 2019-02-21 NOTE — PROGRESS NOTES
Pt is cooperative with care and medication compliant  Pt reports anxiety regarding difficulty getting blood work  Pt denies all other s/s including SI and HI  Pt became loud and yelling when blood was not coming out into tube fast enough, techs and nurses unable to get blood work in the morning  Pt comes out for meals and groups and is visible on the unit  Pt is social with peers and is currently sitting in the day room with peers talking  Will continue to monitor

## 2019-02-21 NOTE — CASE MANAGEMENT
Left 2nd  for [facility] staff [phone] to follow up on [facility] referral that was sent on 2/19

## 2019-02-22 LAB — LITHIUM SERPL-SCNC: 1 MMOL/L (ref 0.6–1.2)

## 2019-02-22 PROCEDURE — 99232 SBSQ HOSP IP/OBS MODERATE 35: CPT | Performed by: NURSE PRACTITIONER

## 2019-02-22 PROCEDURE — 80178 ASSAY OF LITHIUM: CPT | Performed by: NURSE PRACTITIONER

## 2019-02-22 RX ADMIN — ALBUTEROL SULFATE 2 PUFF: 90 AEROSOL, METERED RESPIRATORY (INHALATION) at 13:37

## 2019-02-22 RX ADMIN — FLUTICASONE FUROATE AND VILANTEROL TRIFENATATE 1 PUFF: 200; 25 POWDER RESPIRATORY (INHALATION) at 08:21

## 2019-02-22 RX ADMIN — ALBUTEROL SULFATE 2 PUFF: 90 AEROSOL, METERED RESPIRATORY (INHALATION) at 08:21

## 2019-02-22 RX ADMIN — PREDNISONE 5 MG: 5 TABLET ORAL at 08:20

## 2019-02-22 RX ADMIN — QUETIAPINE 50 MG: 50 TABLET ORAL at 08:20

## 2019-02-22 RX ADMIN — THEOPHYLLINE ANHYDROUS 200 MG: 200 CAPSULE, EXTENDED RELEASE ORAL at 08:20

## 2019-02-22 RX ADMIN — ALBUTEROL SULFATE 2 PUFF: 90 AEROSOL, METERED RESPIRATORY (INHALATION) at 21:58

## 2019-02-22 RX ADMIN — LITHIUM CARBONATE 450 MG: 450 TABLET, EXTENDED RELEASE ORAL at 21:57

## 2019-02-22 RX ADMIN — LEVOTHYROXINE SODIUM 125 MCG: 125 TABLET ORAL at 06:11

## 2019-02-22 RX ADMIN — LITHIUM CARBONATE EXTENDED-RELEASE TABLET 300 MG: 300 TABLET, FILM COATED, EXTENDED RELEASE ORAL at 08:20

## 2019-02-22 RX ADMIN — NICOTINE 14 MG: 14 PATCH, EXTENDED RELEASE TRANSDERMAL at 08:21

## 2019-02-22 RX ADMIN — PANTOPRAZOLE SODIUM 20 MG: 20 TABLET, DELAYED RELEASE ORAL at 06:11

## 2019-02-22 RX ADMIN — QUETIAPINE FUMARATE 450 MG: 50 TABLET, FILM COATED ORAL at 21:58

## 2019-02-22 NOTE — PLAN OF CARE
[facility] personal care home referral is being reviewed by staff and CM will be given an update early next week

## 2019-02-22 NOTE — PROGRESS NOTES
Patient spent evening sitting in day room  Social with peers and staff, initiates conversation  Conversation appropriate, no paranoia or delusions  Fixated on ecchymotic area on right antecubital from previous lab draw   Denies anxiety, depression, SI

## 2019-02-22 NOTE — PLAN OF CARE
Problem: Alteration in Thoughts and Perception  Goal: Treatment Goal: Gain control of psychotic behaviors/thinking, reduce/eliminate presenting symptoms and demonstrate improved reality functioning upon discharge  Outcome: Progressing     Problem: Depression  Goal: Treatment Goal: Demonstrate behavioral control of depressive symptoms, verbalize feelings of improved mood/affect, and adopt new coping skills prior to discharge  Outcome: Progressing  Goal: Verbalize thoughts and feelings  Description  Interventions:  - Assess and re-assess patient's level of risk   - Engage patient in 1:1 interactions, daily, for a minimum of 15 minutes   - Encourage patient to express feelings, fears, frustrations, hopes    Outcome: Progressing  Goal: Refrain from harming self  Description  Interventions:  - Monitor patient closely, per order   - Supervise medication ingestion, monitor effects and side effects    Outcome: Progressing  Goal: Refrain from isolation  Description  Interventions:  - Develop a trusting relationship   - Encourage socialization    Outcome: Progressing  Goal: Refrain from self-neglect  Outcome: Progressing  Goal: Attend and participate in unit activities, including therapeutic, recreational, and educational groups  Description  Interventions:  - Provide therapeutic and educational activities daily, encourage attendance and participation, and document same in the medical record    Outcome: Progressing  Goal: Complete daily ADLs, including personal hygiene independently, as able  Description  Interventions:  - Observe, teach, and assist patient with ADLS  -  Monitor and promote a balance of rest/activity, with adequate nutrition and elimination    Outcome: Progressing     Problem: Anxiety  Goal: Anxiety is at manageable level  Description  Interventions:  - Assess and monitor patient's anxiety level     - Monitor for signs and symptoms of anxiety both physical and emotional (heart palpitations, chest pain, shortness of breath, headaches, nausea, feeling jumpy, restlessness, irritable, apprehensive)  - Collaborate with interdisciplinary team and initiate plan and interventions as ordered    - [place] patient to unit/surroundings  - Explain treatment plan  - Encourage participation in care  - Encourage verbalization of concerns/fears  - Identify coping mechanisms  - Assist in developing anxiety-reducing skills  - Administer/offer alternative therapies  - Limit or eliminate stimulants   Outcome: Progressing

## 2019-02-22 NOTE — CASE MANAGEMENT
Per Erin Jorge, patient is being considered at [facility], they are aware of patient's oxygen needs and do not believe this will be a challenge to manage  They are reviewing for appropriateness of their facility

## 2019-02-22 NOTE — CASE MANAGEMENT
Spoke to pt briefly regarding [facility] placement  CM gave update on [facility] referral  Pt asked where the [facility] is located and CM gave her the address  Pt started making accusations about [facility] stating that it is a terrible place and it's in a bad area  She stated that she has her heart set on [place] and still doesn't understand why they won't accept her due to her O2 needs  CM stated the clinical team continues to advocate for her acceptance into the [facility] program but we need to have a back up plan in case that is not a viable option  CM stated that staff from [facility] will be contacting CM early next week to discuss referral and hopefully schedule an in-person meeting  CM encouraged pt to be open-minded and remember that no matter where she transitions to in the [facility], she'll now have access to all the providers/treatment that she desired but wasn't able to access while living at [facility]  CM reminded her that she'll have support moving forward that she didn't have at her previous [facility] and that should be the primary focus instead of being fixated on supportive housing that doesn't meet her standards

## 2019-02-22 NOTE — PROGRESS NOTES
Pt was calm and cooperative with care  Pt was medication compliant  Pt reported anxiety, but denied all other symptoms  Pt continues to fixate on bloodwork that was attempted yesterday  Pt was out in the milieu, and interacted with her peers  Will continue to monitor

## 2019-02-22 NOTE — PROGRESS NOTES
Progress Note - Behavioral Health   Melody Fox 66 y o  female MRN: [id number]  Unit/Bed#: Shefali Haney 361-16 Encounter: [id number]    The patient was seen for continuing care and reviewed with treatment team  Staff reports that the patient is attending groups and is social with peers on the unit  No paranoia or delusions were witnessed by staff over night  Staff reports that patient is cooperative with care and medication compliant, but had anxiety regarding another lab draw scheduled for this am  According to case management a referral was sent to [facility] for possible discharge placement  CM is currently waiting for a time to set up assessment for patient on the unit  Patient was irritable and dismissive on approach  Difficult to discuss care with patient and uncooperative initially  Patient states, "Right now is not a good time to talk about me  I have nothing to give you  I will only talk about my discharge plans with my  Helen Sprague " Lithium level was ordered for this am and patient reports that she was compliant with lab draw  Patient denies any current thoughts to hurt herself or others, and currently denies any paranoia or delusions regarding her family  Patient reports that she has been attending groups and is motivated to improve mood symptoms  Mental Status Evaluation:  Appearance:  Good eye contact and disheveled   Behavior:  calm and Dismissive   Fund of knowledge  Unable to assess due to patient factors   Speech:   Language: Normal rate and Normal volume  No overt abnormality   Mood:  irritable and anxious   Affect:   Associations: appropriate  Intact   Thought Process:   Tangential   Thought Content:  Paranoid and mistrustful   Perceptual Disturbances: Denies hallucinations and does not appear to be responding to internal stimuli   Risk Potential: No suicidal or homicidal ideation   Orientation  Oriented x 3   Memory Not tested   Attention/Concentration attention span and concentration were age appropriate   Insight:  limited   Judgment: Poor judgment   Gait/Station: slow   Motor Activity: No abnormal movement noted     Progress Toward Goals: no change    Assessment/Plan    Principal Problem:    Schizoaffective disorder, bipolar type ([address] )      Recommended Treatment: Continue with pharmacotherapy, group therapy, milieu therapy and occupational therapy  Lithium will be maintained at 300mg po daily and 450mg at HS   Lithium level was 1 0    Will continue Seroquel 50 mg po daily and 450mg at bedtime     The patient will be maintained on the following medications:    Current Facility-Administered Medications:  acetaminophen 650 mg Oral Q6H PRN Aristides Drone, CRNP   acetaminophen 650 mg Oral Q4H PRN Aristides Drone, CRNP   acetaminophen 975 mg Oral Q6H PRN Aristides Drone, CRNP   albuterol 2 puff Inhalation TID Ernesto Horn MD   aluminum-magnesium hydroxide-simethicone 30 mL Oral Q4H PRN Aristides Drone, CRNP   benztropine 1 mg Intramuscular BID PRN Aristides Drone, CRNP   benztropine 1 mg Oral BID PRN Aristides Drone, CRNP   EPINEPHrine PF 0 15 mg Intramuscular PRN Vidales Floss, CRNP   fluticasone-vilanterol 1 puff Inhalation Daily Black Smith MD   hydrOXYzine HCL 25 mg Oral Q6H PRN Aristides Drone, CRNP   levalbuterol 1 25 mg Nebulization Q8H PRN Ernesto Horn MD   Or       sodium chloride 3 mL Nebulization Q8H PRN Ernesto Horn MD   levothyroxine 125 mcg Oral Daily Vidales Floss, CRNP   lithium carbonate 300 mg Oral Daily Freeman Na, CRNP   lithium carbonate 450 mg Oral HS Freeman Na, CRNP   LORazepam 1 mg Intramuscular Q4H PRN Aristides Drone, CRNP   LORazepam 0 5 mg Oral Q4H PRN Aristides Drone, CRNP   magnesium hydroxide 30 mL Oral Daily PRN Aristides Drone, CRNP   nicotine 14 mg Transdermal Daily Vicky Ciminieri, CRNP   nicotine polacrilex 2 mg Oral Q2H PRN Aristides Drone, CRNP   pantoprazole 20 mg Oral Daily Vicky Ciminieri, CRNP   predniSONE 5 mg Oral Daily Flonnie North ABILIO Fields   QUEtiapine 450 mg Oral HS ABILIO Lawler   QUEtiapine 50 mg Oral Daily Tamra Dong MD   theophylline 200 mg Oral Daily ABILIO Nguyen   traZODone 25 mg Oral HS PRN ABILIO Peters       Risks, benefits and possible side effects of Medications:   Risks, benefits, and possible side effects of medications explained to patient and patient verbalizes understanding

## 2019-02-22 NOTE — CASE MANAGEMENT
CM emailed Nori Barren Apgar from [facility] in an effort to advocate for pt as she continues to have her heart set on going to [facility]  CM explained that pt has a portable oxygenator that plugs in as well as another one that's battery operated  In the event of a power outage, pt would have a back up option to assist with her breathing  CM also explained that pt receives all of her oxygen equipment from an outside [address] [place] [address] and handles all of that independently  As long as nursing staff would check the equipment prior to them leaving at night, there shouldn't be any risk issues   CM will continue to follow up

## 2019-02-22 NOTE — PROGRESS NOTES
Progress Note - Behavioral Health     Erika Titus 66 y o  female MRN: [id number]  Unit/Bed#: [facility] 258-64 Encounter: [id number]    Erika Titus was seen for continuing care and reviewed with treatment team   Pt has a h/o Schizoaffective D/O with paranoid ideations: ie:  that her family is trying to poison her, had a tracking device implanted in her forearm, that members of her former group home are trying to poison her  She had become noncompliant with medications due to dissatisfaction with her living situation and provider  Pt is breathing comfortably on contiuous O2 as she presently reports "I was needlessly upset last evening and today" due to a prior roommate keeping her awake  Another Pt wandered into her room last night as well which brought out a bit of paranoid feeling in Pt that her peer was after her  Pt was given and new room and today states "I'm calming down now  She reports feeling depressed and concerned about other residents and is anxious  She presently denies SI, HI, hallucinations, or paranoia at this moment  Floor team relayed Pt was anxious and yelled about other peers coming into her room this morning, but otherwise has been calm, medication compliant and social with her peers  She can be a bit intrusive with her peers  She is attending some therapeutic groups and has been noted to minimize her needs in regards to coping skills   is assisting with placement --Pt is being [facility] for [facility]        Sleep:Good  Appetite: Good   Medication side effects: None per Pt    ROS: No complaints per Pt    Labs/EKG/Chest CT without contrast/CXR/CT thoracic and lumbar spine/ CT non-contrast of abdomino-pelvic area:  Reviewed    Li level was 1 on 2/22/2019      Mental Status Evaluation:  Appearance:  Dressed, suboptimal hygiene, good eye contact   Behavior:  Calm, cooperative, pleasant   Speech:  Clear, normal rate and volume   Mood:  Anxious, Depressed   Affect: Constricted   Thought Process:  Generally organized but circumstantial   Associations: Intact associations, some circumstantial   Thought Content:  Paranoid delusions   Perceptual Disturbances: Pt denies any hallucinations but can feel paranoid at times    She does not appear to be responding to internal stimuli    Risk Potential: Pt presently denies SI or HI    Sensorium:  Self, Place, Day of the week, Month, Year   Memory:  short term memory grossly intact   Consciousness:  alert, awake   Attention: Good   Insight:  Limited   Judgment: Limited   Gait/Station: Ambulates with walker   Motor Activity: No abnormal movements     Vitals:    02/23/19 0727   BP: 99/57   Pulse: 76   Resp: 18   Temp: 97 8 °F (36 6 °C)   SpO2: 99%       Admission on 01/15/2019   Component Date Value    Cholesterol 01/17/2019 212*    Triglycerides 01/17/2019 112     HDL, Direct 01/17/2019 56     LDL Calculated 01/17/2019 134*    Non-HDL-Chol (CHOL-HDL) 01/17/2019 156     RPR 01/17/2019 Non-Reactive     Sodium 01/17/2019 139     Potassium 01/17/2019 4 4     Chloride 01/17/2019 105     CO2 01/17/2019 28     ANION GAP 01/17/2019 6     BUN 01/17/2019 12     Creatinine 01/17/2019 0 70     Glucose 01/17/2019 124*    Calcium 01/17/2019 9 4     AST 01/17/2019 17     ALT 01/17/2019 21     Alkaline Phosphatase 01/17/2019 89     Total Protein 01/17/2019 6 8     Albumin 01/17/2019 3 7     Total Bilirubin 01/17/2019 0 30     eGFR 01/17/2019 94     WBC 01/17/2019 9 50     RBC 01/17/2019 4 33     Hemoglobin 01/17/2019 13 4     Hematocrit 01/17/2019 41 9     MCV 01/17/2019 97     MCH 01/17/2019 31 0     MCHC 01/17/2019 32 0     RDW 01/17/2019 13 5     MPV 01/17/2019 10 4     Platelets 00/97/7953 204     Neutrophils Relative 01/17/2019 82*    Lymphocytes Relative 01/17/2019 12*    Monocytes Relative 01/17/2019 5     Eosinophils Relative 01/17/2019 1     Basophils Relative 01/17/2019 1     Neutrophils Absolute 01/17/2019 7 80     Lymphocytes Absolute 01/17/2019 1 20     Monocytes Absolute 01/17/2019 0 40     Eosinophils Absolute 01/17/2019 0 00     Basophils Absolute 01/17/2019 0 10     Hemoglobin A1C 01/17/2019 5 5     EAG 01/17/2019 111     TSH 3RD GENERATON 01/17/2019 3 280     POC Glucose 01/16/2019 114*    Ventricular Rate 01/17/2019 74     Atrial Rate 01/17/2019 74     [place] Interval 01/17/2019 160     QRSD Interval 01/17/2019 90     QT Interval 01/17/2019 404     QTC Interval 01/17/2019 448     P Axis 01/17/2019 78     QRS Axis 01/17/2019 -62     T Wave Axis 01/17/2019 54     Lithium Lvl 01/22/2019 0 5*    [place] Lvl 01/23/2019 0 5*    [place] Lvl 01/29/2019 0 8     WBC 02/09/2019 10 20     RBC 02/09/2019 4 12     Hemoglobin 02/09/2019 12 9     Hematocrit 02/09/2019 39 9     MCV 02/09/2019 97     MCH 02/09/2019 31 4     MCHC 02/09/2019 32 4     RDW 02/09/2019 14 2     MPV 02/09/2019 9 7     Platelets 27/69/0972 237     Neutrophils Relative 02/09/2019 61     Lymphocytes Relative 02/09/2019 27     Monocytes Relative 02/09/2019 9     Eosinophils Relative 02/09/2019 2     Basophils Relative 02/09/2019 1     Neutrophils Absolute 02/09/2019 6 20     Lymphocytes Absolute 02/09/2019 2 80     Monocytes Absolute 02/09/2019 0 90     Eosinophils Absolute 02/09/2019 0 20     Basophils Absolute 02/09/2019 0 10     Sodium 02/09/2019 138     Potassium 02/09/2019 4 0     Chloride 02/09/2019 104     CO2 02/09/2019 27     ANION GAP 02/09/2019 7     BUN 02/09/2019 11     Creatinine 02/09/2019 0 69     Glucose 02/09/2019 94     Calcium 02/09/2019 9 4     AST 02/09/2019 17     ALT 02/09/2019 18     Alkaline Phosphatase 02/09/2019 89     Total Protein 02/09/2019 6 2     Albumin 02/09/2019 3 7     Total Bilirubin 02/09/2019 0 50     eGFR 02/09/2019 94     Lithium Lvl 02/09/2019 0 9     Ventricular Rate 02/11/2019 92     Atrial Rate 02/11/2019 92     [place] Interval 02/11/2019 160     QRSD Interval 02/11/2019 90     QT Interval 02/11/2019 356     QTC Interval 02/11/2019 440     P Axis 02/11/2019 78     QRS Axis 02/11/2019 -62     T Wave Axis 02/11/2019 77     Lithium Lvl 02/22/2019 1 0          Progress Toward Goals:  Based on today's interview and review of prior notes, there has been minimal overall progress in that she is not making statements about police conspiracies  Lithium level is within therapeutic range  Continue present regimen at this time  Assessment/Plan   Principal Problem:    Schizoaffective disorder, bipolar type ([facility] [address] )      Recommended Treatment: Continue with pharmacotherapy, group therapy, milieu therapy and occupational therapy    The patient will be maintained on the following medications:    Current Facility-Administered Medications:  acetaminophen 650 mg Oral Q6H PRN Devora Cambria, CRNP   acetaminophen 650 mg Oral Q4H PRN Devora Cambria, CRNP   acetaminophen 975 mg Oral Q6H PRN Devora Cambria, CRNP   albuterol 2 puff Inhalation TID Alena Garcia MD   aluminum-magnesium hydroxide-simethicone 30 mL Oral Q4H PRN Devora Cambria, CRNP   benztropine 1 mg Intramuscular BID PRN Devora Cambria, CRNP   benztropine 1 mg Oral BID PRN Devora Cambria, CRNP   EPINEPHrine PF 0 15 mg Intramuscular PRN Margrette Guanaco, CRNP   fluticasone-vilanterol 1 puff Inhalation Daily Mary Chery MD   hydrOXYzine HCL 25 mg Oral Q6H PRN Devora Cambria, CRNP   levalbuterol 1 25 mg Nebulization Q8H PRN Alena Garcia MD   Or       sodium chloride 3 mL Nebulization Q8H PRN Alena Garcia MD   levothyroxine 125 mcg Oral Daily Vicky Ciminieri, CRNP   lithium carbonate 300 mg Oral Daily Merryl Ee, CRNP   lithium carbonate 450 mg Oral HS Merryl Ee, CRNP   LORazepam 1 mg Intramuscular Q4H PRN Devora Cambria, CRNP   LORazepam 0 5 mg Oral Q4H PRN Devora Cambria, CRNP   magnesium hydroxide 30 mL Oral Daily PRN Devora Cambria, CRNP   nicotine 14 mg Transdermal Daily Jaz West ABILIO Fields   nicotine polacrilex 2 mg Oral Q2H PRN ABILIO Clark   pantoprazole 20 mg Oral Daily ABILIO Nguyen   predniSONE 5 mg Oral Daily Vicky ABILIO Fields   QUEtiapine 450 mg Oral HS ABILIO Hewitt   QUEtiapine 50 mg Oral Daily Alessandro Perez MD   theophylline 200 mg Oral Daily ABILIO Nguyen   traZODone 25 mg Oral HS PRN ABILIO Clark       Risks, benefits and possible side effects of Medications:   Risks, benefits, and possible side effects of medications explained to patient and patient verbalizes understanding

## 2019-02-22 NOTE — CASE MANAGEMENT
Spoke to Hai at [id number] regarding [facility] referral  She stated that she received the referral yesterday and has a few others to review as well  She will call CM early next week to set up a time to come assess pt on the unit  CM stated that this is a time sensitive matter and if there's any way to expedite the process, it would be greatly appreciated

## 2019-02-22 NOTE — PLAN OF CARE
Pt  Attended  two of  three groups today  She minimized her own coping needs and needed some redirection away from judgmental comments toward visiting students

## 2019-02-23 PROCEDURE — 99231 SBSQ HOSP IP/OBS SF/LOW 25: CPT | Performed by: PHYSICIAN ASSISTANT

## 2019-02-23 RX ADMIN — QUETIAPINE FUMARATE 450 MG: 50 TABLET, FILM COATED ORAL at 22:01

## 2019-02-23 RX ADMIN — LITHIUM CARBONATE 450 MG: 450 TABLET, EXTENDED RELEASE ORAL at 22:01

## 2019-02-23 RX ADMIN — LITHIUM CARBONATE EXTENDED-RELEASE TABLET 300 MG: 300 TABLET, FILM COATED, EXTENDED RELEASE ORAL at 08:50

## 2019-02-23 RX ADMIN — PANTOPRAZOLE SODIUM 20 MG: 20 TABLET, DELAYED RELEASE ORAL at 06:08

## 2019-02-23 RX ADMIN — THEOPHYLLINE ANHYDROUS 200 MG: 200 CAPSULE, EXTENDED RELEASE ORAL at 08:50

## 2019-02-23 RX ADMIN — ALBUTEROL SULFATE 2 PUFF: 90 AEROSOL, METERED RESPIRATORY (INHALATION) at 22:01

## 2019-02-23 RX ADMIN — NICOTINE 14 MG: 14 PATCH, EXTENDED RELEASE TRANSDERMAL at 08:53

## 2019-02-23 RX ADMIN — ALBUTEROL SULFATE 2 PUFF: 90 AEROSOL, METERED RESPIRATORY (INHALATION) at 08:49

## 2019-02-23 RX ADMIN — FLUTICASONE FUROATE AND VILANTEROL TRIFENATATE 1 PUFF: 200; 25 POWDER RESPIRATORY (INHALATION) at 08:58

## 2019-02-23 RX ADMIN — PREDNISONE 5 MG: 5 TABLET ORAL at 08:50

## 2019-02-23 RX ADMIN — QUETIAPINE 50 MG: 50 TABLET ORAL at 08:50

## 2019-02-23 RX ADMIN — LEVOTHYROXINE SODIUM 125 MCG: 125 TABLET ORAL at 06:09

## 2019-02-23 RX ADMIN — ALBUTEROL SULFATE 2 PUFF: 90 AEROSOL, METERED RESPIRATORY (INHALATION) at 13:52

## 2019-02-23 NOTE — PLAN OF CARE
Problem: Alteration in Thoughts and Perception  Goal: Treatment Goal: Gain control of psychotic behaviors/thinking, reduce/eliminate presenting symptoms and demonstrate improved reality functioning upon discharge  Outcome: Progressing     Problem: Depression  Goal: Treatment Goal: Demonstrate behavioral control of depressive symptoms, verbalize feelings of improved mood/affect, and adopt new coping skills prior to discharge  Outcome: Progressing  Goal: Verbalize thoughts and feelings  Description  Interventions:  - Assess and re-assess patient's level of risk   - Engage patient in 1:1 interactions, daily, for a minimum of 15 minutes   - Encourage patient to express feelings, fears, frustrations, hopes    Outcome: Progressing  Goal: Refrain from harming self  Description  Interventions:  - Monitor patient closely, per order   - Supervise medication ingestion, monitor effects and side effects    Outcome: Progressing  Goal: Refrain from isolation  Description  Interventions:  - Develop a trusting relationship   - Encourage socialization    Outcome: Progressing  Goal: Refrain from self-neglect  Outcome: Progressing  Goal: Attend and participate in unit activities, including therapeutic, recreational, and educational groups  Description  Interventions:  - Provide therapeutic and educational activities daily, encourage attendance and participation, and document same in the medical record    Outcome: Progressing  Goal: Complete daily ADLs, including personal hygiene independently, as able  Description  Interventions:  - Observe, teach, and assist patient with ADLS  -  Monitor and promote a balance of rest/activity, with adequate nutrition and elimination    Outcome: Progressing     Problem: Anxiety  Goal: Anxiety is at manageable level  Description  Interventions:  - Assess and monitor patient's anxiety level     - Monitor for signs and symptoms of anxiety both physical and emotional (heart palpitations, chest pain, shortness of breath, headaches, nausea, feeling jumpy, restlessness, irritable, apprehensive)  - Collaborate with interdisciplinary team and initiate plan and interventions as ordered    - [place] patient to unit/surroundings  - Explain treatment plan  - Encourage participation in care  - Encourage verbalization of concerns/fears  - Identify coping mechanisms  - Assist in developing anxiety-reducing skills  - Administer/offer alternative therapies  - Limit or eliminate stimulants   Outcome: Not Progressing  See progress note regarding anxiety

## 2019-02-23 NOTE — PROGRESS NOTES
Pt calm and cooperative  Pt reports anxiety, denies remaining s/s  Pt seen in the milieu pleasant associating with her peers  Pt is medication compliant

## 2019-02-23 NOTE — PROGRESS NOTES
Patient denied depression  Had mild anxiety due to a particular staff member last night not being consistent with patients  She shared with me that this staff member told her she could not help a patient but then let another patient help a patient  She denies SI/ HI/ hallucinations  Present in the milieu  Cooperative and compliant with medications  1000 patient was upset that another patient was going into her room  She was screaming into the [place]  She was also upset going back to her room because she didn't know how long she was out of oxygen  She refused atarax due to not taken it previously  She mentioned about ativan  Asked her if she wanted that  She said she did not need it and she would calm down  1300 Patient has remained calm since earlier incident

## 2019-02-24 PROCEDURE — 99231 SBSQ HOSP IP/OBS SF/LOW 25: CPT | Performed by: PHYSICIAN ASSISTANT

## 2019-02-24 RX ADMIN — THEOPHYLLINE ANHYDROUS 200 MG: 200 CAPSULE, EXTENDED RELEASE ORAL at 09:37

## 2019-02-24 RX ADMIN — QUETIAPINE 50 MG: 50 TABLET ORAL at 09:38

## 2019-02-24 RX ADMIN — LITHIUM CARBONATE EXTENDED-RELEASE TABLET 300 MG: 300 TABLET, FILM COATED, EXTENDED RELEASE ORAL at 09:38

## 2019-02-24 RX ADMIN — QUETIAPINE 475 MG: 400 TABLET, FILM COATED ORAL at 21:39

## 2019-02-24 RX ADMIN — ALBUTEROL SULFATE 2 PUFF: 90 AEROSOL, METERED RESPIRATORY (INHALATION) at 14:02

## 2019-02-24 RX ADMIN — ALBUTEROL SULFATE 2 PUFF: 90 AEROSOL, METERED RESPIRATORY (INHALATION) at 19:45

## 2019-02-24 RX ADMIN — PREDNISONE 5 MG: 5 TABLET ORAL at 09:37

## 2019-02-24 RX ADMIN — PANTOPRAZOLE SODIUM 20 MG: 20 TABLET, DELAYED RELEASE ORAL at 06:16

## 2019-02-24 RX ADMIN — LITHIUM CARBONATE 450 MG: 450 TABLET, EXTENDED RELEASE ORAL at 21:37

## 2019-02-24 RX ADMIN — LEVOTHYROXINE SODIUM 125 MCG: 125 TABLET ORAL at 06:15

## 2019-02-24 RX ADMIN — NICOTINE 14 MG: 14 PATCH, EXTENDED RELEASE TRANSDERMAL at 09:39

## 2019-02-24 RX ADMIN — ALBUTEROL SULFATE 2 PUFF: 90 AEROSOL, METERED RESPIRATORY (INHALATION) at 09:39

## 2019-02-24 RX ADMIN — FLUTICASONE FUROATE AND VILANTEROL TRIFENATATE 1 PUFF: 200; 25 POWDER RESPIRATORY (INHALATION) at 09:38

## 2019-02-24 NOTE — PROGRESS NOTES
Pt is calm and cooperative  Pt present in milieu for meals and group  Pt is intrusive in roommate's care  Pt is med compliant  Will continue to monitor

## 2019-02-24 NOTE — PLAN OF CARE
Problem: Depression  Goal: Treatment Goal: Demonstrate behavioral control of depressive symptoms, verbalize feelings of improved mood/affect, and adopt new coping skills prior to discharge  Outcome: Progressing  Goal: Verbalize thoughts and feelings  Description  Interventions:  - Assess and re-assess patient's level of risk   - Engage patient in 1:1 interactions, daily, for a minimum of 15 minutes   - Encourage patient to express feelings, fears, frustrations, hopes    Outcome: Progressing  Goal: Refrain from harming self  Description  Interventions:  - Monitor patient closely, per order   - Supervise medication ingestion, monitor effects and side effects    Outcome: Progressing  Goal: Refrain from isolation  Description  Interventions:  - Develop a trusting relationship   - Encourage socialization    Outcome: Progressing  Goal: Refrain from self-neglect  Outcome: Progressing  Goal: Attend and participate in unit activities, including therapeutic, recreational, and educational groups  Description  Interventions:  - Provide therapeutic and educational activities daily, encourage attendance and participation, and document same in the medical record    Outcome: Progressing  Goal: Complete daily ADLs, including personal hygiene independently, as able  Description  Interventions:  - Observe, teach, and assist patient with ADLS  -  Monitor and promote a balance of rest/activity, with adequate nutrition and elimination    Outcome: Progressing

## 2019-02-24 NOTE — PROGRESS NOTES
Progress Note - Behavioral Health     Madison Miner 66 y o  female MRN: [id number]  Unit/Bed#: [facility] 626-22 Encounter: [id number]    Madison Miner was seen for continuing care and reviewed with treatment team   Pt reported, "I'm very annoyed today, I'm better now "  She "Had a good night last nigh, played games, ate snacks "  She likes her new roommate whom she finds, "Interesting "  Pt reports some anxiety due to concern about two other patients who tend to wander into her room or past her room  She thinks they might come in and this can make her paranoid  She also wanted her inhaler on hand sooner than the nurse gave it this morning  She also was insistent that her O2 tank be changed because it was "Close to the red "  She felt lesser flow, but appeared to be breathing and speaking fine, and when the nurse tested the tank, there was plenty of flow noted--audibly and tangibly  She presently denies depression, SI ,HI, or hallucinations  Floor team relays she remains calm, medication compliant and visible in the milieu  She can be demanding and needy of nurse's assistance for things she can do on her own  She is also intrusive regarding the care of other Pts  Sleep:Good  Appetite: Good   Medication side effects: None per Pt     ROS: No complaints per Pt     Labs/Tests: Reviewed     Mental Status Evaluation:  Appearance:  Dressed, clean, good eye contact, breathing and speaking comfortably with continuous O2 by nasal canula  Behavior:  Calm, cooperative, pleasant   Speech:  Clear, normal rate and volume   Mood:  Anxious   Affect:  Constricted   Thought Process:  Generally organized but circumstantial   Can be obsessive  Perseverative on her O2, things she wants in general and must have needs met immediately   Associations: Intact associations, some circumstantial   Thought Content:  Less paranoia today   Perceptual Disturbances: Pt denies hallucinations   She is paranoid at times   Risk Potential: Pt presently denies SI or HI    Sensorium:  Self, Place, Day of the week, Month, Year   Memory:  short term memory grossly intact   Consciousness:  alert, awake   Attention: Good   Insight:  Limited   Judgment: Limited   Gait/Station: Normal gait/station   Motor Activity: No abnormal movements     Vitals:    02/24/19 1515   BP: 110/62   Pulse: 80   Resp: 20   Temp: 98 9 °F (37 2 °C)   SpO2: 97%       Admission on 01/15/2019   Component Date Value    Cholesterol 01/17/2019 212*    Triglycerides 01/17/2019 112     HDL, Direct 01/17/2019 56     LDL Calculated 01/17/2019 134*    Non-HDL-Chol (CHOL-HDL) 01/17/2019 156     RPR 01/17/2019 Non-Reactive     Sodium 01/17/2019 139     Potassium 01/17/2019 4 4     Chloride 01/17/2019 105     CO2 01/17/2019 28     ANION GAP 01/17/2019 6     BUN 01/17/2019 12     Creatinine 01/17/2019 0 70     Glucose 01/17/2019 124*    Calcium 01/17/2019 9 4     AST 01/17/2019 17     ALT 01/17/2019 21     Alkaline Phosphatase 01/17/2019 89     Total Protein 01/17/2019 6 8     Albumin 01/17/2019 3 7     Total Bilirubin 01/17/2019 0 30     eGFR 01/17/2019 94     WBC 01/17/2019 9 50     RBC 01/17/2019 4 33     Hemoglobin 01/17/2019 13 4     Hematocrit 01/17/2019 41 9     MCV 01/17/2019 97     MCH 01/17/2019 31 0     MCHC 01/17/2019 32 0     RDW 01/17/2019 13 5     MPV 01/17/2019 10 4     Platelets 45/63/8147 204     Neutrophils Relative 01/17/2019 82*    Lymphocytes Relative 01/17/2019 12*    Monocytes Relative 01/17/2019 5     Eosinophils Relative 01/17/2019 1     Basophils Relative 01/17/2019 1     Neutrophils Absolute 01/17/2019 7 80     Lymphocytes Absolute 01/17/2019 1 20     Monocytes Absolute 01/17/2019 0 40     Eosinophils Absolute 01/17/2019 0 00     Basophils Absolute 01/17/2019 0 10     Hemoglobin A1C 01/17/2019 5 5     EAG 01/17/2019 111     TSH 3RD GENERATON 01/17/2019 3 280     POC Glucose 01/16/2019 114*    Ventricular Rate 01/17/2019 74  Atrial Rate 01/17/2019 74     CT Interval 01/17/2019 160     QRSD Interval 01/17/2019 90     QT Interval 01/17/2019 404     QTC Interval 01/17/2019 448     P Axis 01/17/2019 78     QRS Axis 01/17/2019 -62     T Wave Axis 01/17/2019 54     Lithium Lvl 01/22/2019 0 5*    [place] Lvl 01/23/2019 0 5*    [place] Lvl 01/29/2019 0 8     WBC 02/09/2019 10 20     RBC 02/09/2019 4 12     Hemoglobin 02/09/2019 12 9     Hematocrit 02/09/2019 39 9     MCV 02/09/2019 97     MCH 02/09/2019 31 4     MCHC 02/09/2019 32 4     RDW 02/09/2019 14 2     MPV 02/09/2019 9 7     Platelets 00/16/6811 237     Neutrophils Relative 02/09/2019 61     Lymphocytes Relative 02/09/2019 27     Monocytes Relative 02/09/2019 9     Eosinophils Relative 02/09/2019 2     Basophils Relative 02/09/2019 1     Neutrophils Absolute 02/09/2019 6 20     Lymphocytes Absolute 02/09/2019 2 80     Monocytes Absolute 02/09/2019 0 90     Eosinophils Absolute 02/09/2019 0 20     Basophils Absolute 02/09/2019 0 10     Sodium 02/09/2019 138     Potassium 02/09/2019 4 0     Chloride 02/09/2019 104     CO2 02/09/2019 27     ANION GAP 02/09/2019 7     BUN 02/09/2019 11     Creatinine 02/09/2019 0 69     Glucose 02/09/2019 94     Calcium 02/09/2019 9 4     AST 02/09/2019 17     ALT 02/09/2019 18     Alkaline Phosphatase 02/09/2019 89     Total Protein 02/09/2019 6 2     Albumin 02/09/2019 3 7     Total Bilirubin 02/09/2019 0 50     eGFR 02/09/2019 94     Lithium Lvl 02/09/2019 0 9     Ventricular Rate 02/11/2019 92     Atrial Rate 02/11/2019 92     CT Interval 02/11/2019 160     QRSD Interval 02/11/2019 90     QT Interval 02/11/2019 356     QTC Interval 02/11/2019 440     P Axis 02/11/2019 78     QRS Axis 02/11/2019 -62     T Wave Axis 02/11/2019 77     Lithium Lvl 02/22/2019 1 0        Progress Toward Goals:  No significant change in the past 24 hours    Increase Quetiapine by 25mg qhs (for a total of 475mg qhs), and continue the daytime dose of 50mg qd  Continue all other psychiatric medications unchanged  Assessment/Plan   Principal Problem:    Schizoaffective disorder, bipolar type ([address] )      Recommended Treatment: Continue with pharmacotherapy, group therapy, milieu therapy and occupational therapy    The patient will be maintained on the following medications:    Current Facility-Administered Medications:  acetaminophen 650 mg Oral Q6H PRN Kong Silver, CRNP   acetaminophen 650 mg Oral Q4H PRN Kong Silver, CRNP   acetaminophen 975 mg Oral Q6H PRN Kong Silver, CRNP   albuterol 2 puff Inhalation TID Umang Lin MD   aluminum-magnesium hydroxide-simethicone 30 mL Oral Q4H PRN Kong Silver, CRNP   benztropine 1 mg Intramuscular BID PRN Kong Silver, CRNP   benztropine 1 mg Oral BID PRN Kong Silver, CRNP   EPINEPHrine PF 0 15 mg Intramuscular PRN [employer], CRNP   fluticasone-vilanterol 1 puff Inhalation Daily Pia Hernandez MD   hydrOXYzine HCL 25 mg Oral Q6H PRN Kong Silver, CRNP   levalbuterol 1 25 mg Nebulization Q8H PRN Umang Lin MD   Or       sodium chloride 3 mL Nebulization Q8H PRN Umang Lin MD   levothyroxine 125 mcg Oral Daily Vicky Ciminieri, CRNP   lithium carbonate 300 mg Oral Daily Brenda Hawk, CRNP   lithium carbonate 450 mg Oral HS Brenda Hawk, CRNP   LORazepam 1 mg Intramuscular Q4H PRN Kong Silver, CRNP   LORazepam 0 5 mg Oral Q4H PRN Kong Silver, CRNP   magnesium hydroxide 30 mL Oral Daily PRN Kong Silver, CRNP   nicotine 14 mg Transdermal Daily Vicky Ciminieri, CRNP   nicotine polacrilex 2 mg Oral Q2H PRN Kong Silver, CRNP   pantoprazole 20 mg Oral Daily Vicky Ciminieri, CRNP   predniSONE 5 mg Oral Daily Vicky Ciminieri, CRNP   QUEtiapine 475 mg Oral HS Yenni Sánchez PA-C   QUEtiapine 50 mg Oral Daily Gladis Haines MD   theophylline 200 mg Oral Daily Vicky Ciminieri, CRNP   traZODone 25 mg Oral HS PRN Kong Silver, CRNP Risks, benefits and possible side effects of Medications:   Risks, benefits, and possible side effects of medications explained to patient and patient verbalizes understanding

## 2019-02-24 NOTE — PROGRESS NOTES
Patient is cooperative and compliant with medications  She has mild anxiety due to not being woken up for breakfast and not receiving albuterol earlier  Told her she can let me know if she is having trouble breathing  She rang for tech to come help her from bathroom since she was not awoken for breakfast  I asked her why she still feels like she needs someone to help assist her from one place to another all the time  She said she still believes it is due to her ear  Also she says it is due to her agoraphobia and that this place is open and has hardwood floors   She denies all symptoms and is present in the milieu

## 2019-02-24 NOTE — PLAN OF CARE
Problem: Alteration in Thoughts and Perception  Goal: Treatment Goal: Gain control of psychotic behaviors/thinking, reduce/eliminate presenting symptoms and demonstrate improved reality functioning upon discharge  Outcome: Progressing     Problem: Depression  Goal: Treatment Goal: Demonstrate behavioral control of depressive symptoms, verbalize feelings of improved mood/affect, and adopt new coping skills prior to discharge  Outcome: Progressing  Goal: Verbalize thoughts and feelings  Description  Interventions:  - Assess and re-assess patient's level of risk   - Engage patient in 1:1 interactions, daily, for a minimum of 15 minutes   - Encourage patient to express feelings, fears, frustrations, hopes    Outcome: Progressing  Goal: Refrain from harming self  Description  Interventions:  - Monitor patient closely, per order   - Supervise medication ingestion, monitor effects and side effects    Outcome: Progressing  Goal: Refrain from isolation  Description  Interventions:  - Develop a trusting relationship   - Encourage socialization    Outcome: Progressing  Goal: Refrain from self-neglect  Outcome: Progressing  Goal: Attend and participate in unit activities, including therapeutic, recreational, and educational groups  Description  Interventions:  - Provide therapeutic and educational activities daily, encourage attendance and participation, and document same in the medical record    Outcome: Progressing  Goal: Complete daily ADLs, including personal hygiene independently, as able  Description  Interventions:  - Observe, teach, and assist patient with ADLS  -  Monitor and promote a balance of rest/activity, with adequate nutrition and elimination    Outcome: Progressing     Problem: Anxiety  Goal: Anxiety is at manageable level  Description  Interventions:  - Assess and monitor patient's anxiety level     - Monitor for signs and symptoms of anxiety both physical and emotional (heart palpitations, chest pain, shortness of breath, headaches, nausea, feeling jumpy, restlessness, irritable, apprehensive)  - Collaborate with interdisciplinary team and initiate plan and interventions as ordered    - [place] patient to unit/surroundings  - Explain treatment plan  - Encourage participation in care  - Encourage verbalization of concerns/fears  - Identify coping mechanisms  - Assist in developing anxiety-reducing skills  - Administer/offer alternative therapies  - Limit or eliminate stimulants   Outcome: Progressing

## 2019-02-24 NOTE — PROGRESS NOTES
Pt is calm and cooperative this evening  Pt reports anxiety due to other pts upsetting her  Pt denies remaining s/s  Pt is seen in the milieu for the majority of the evening  Pt is medication compliant

## 2019-02-25 PROCEDURE — 99232 SBSQ HOSP IP/OBS MODERATE 35: CPT | Performed by: NURSE PRACTITIONER

## 2019-02-25 RX ADMIN — ALBUTEROL SULFATE 2 PUFF: 90 AEROSOL, METERED RESPIRATORY (INHALATION) at 21:57

## 2019-02-25 RX ADMIN — LITHIUM CARBONATE EXTENDED-RELEASE TABLET 300 MG: 300 TABLET, FILM COATED, EXTENDED RELEASE ORAL at 08:40

## 2019-02-25 RX ADMIN — THEOPHYLLINE ANHYDROUS 200 MG: 200 CAPSULE, EXTENDED RELEASE ORAL at 08:40

## 2019-02-25 RX ADMIN — QUETIAPINE 50 MG: 50 TABLET ORAL at 08:40

## 2019-02-25 RX ADMIN — ALBUTEROL SULFATE 2 PUFF: 90 AEROSOL, METERED RESPIRATORY (INHALATION) at 13:58

## 2019-02-25 RX ADMIN — ALBUTEROL SULFATE 2 PUFF: 90 AEROSOL, METERED RESPIRATORY (INHALATION) at 08:37

## 2019-02-25 RX ADMIN — LEVOTHYROXINE SODIUM 125 MCG: 125 TABLET ORAL at 05:46

## 2019-02-25 RX ADMIN — PANTOPRAZOLE SODIUM 20 MG: 20 TABLET, DELAYED RELEASE ORAL at 06:00

## 2019-02-25 RX ADMIN — FLUTICASONE FUROATE AND VILANTEROL TRIFENATATE 1 PUFF: 200; 25 POWDER RESPIRATORY (INHALATION) at 08:41

## 2019-02-25 RX ADMIN — LITHIUM CARBONATE 450 MG: 450 TABLET, EXTENDED RELEASE ORAL at 22:17

## 2019-02-25 RX ADMIN — QUETIAPINE FUMARATE 450 MG: 50 TABLET, FILM COATED ORAL at 22:16

## 2019-02-25 RX ADMIN — PREDNISONE 5 MG: 5 TABLET ORAL at 08:40

## 2019-02-25 RX ADMIN — NICOTINE 14 MG: 14 PATCH, EXTENDED RELEASE TRANSDERMAL at 08:39

## 2019-02-25 NOTE — PROGRESS NOTES
Progress Note - Behavioral Health   Jose Ramon Roblero 66 y o  female MRN: [id number]  Unit/Bed#: [facility] 570-82 Encounter: [id number]    The patient was seen for continuing care and reviewed with treatment team   Staff reports the patient has been visible and social   Can be intrusive at times  Has been medication compliant  Calm and cooperative  Placement is pending  Patient is bright on approach  Denies depression but has had increased anxiety at times related to perceived inconveniences  Continues to be demanding and entitled  Remains paranoid and delusional regarding thinking her family is out to get her and says she has proof of this  Denies hallucinations at this time  No SI  Mental Status Evaluation:  Appearance:  Good eye contact and disheveled   Behavior:  calm, cooperative and friendly   Mood:  anxious   Affect: appropriate and broad   Speech: Rambling   Thought Process: Tangential, perseverative   Thought Content:  Paranoid and mistrustful   Perceptual Disturbances: Denies hallucinations and does not appear to be responding to internal stimuli   Risk Potential: No suicidal or homicidal ideation   Attention/Concentration [place] than expected   Orientation:   Oriented x3   Gait/Station:  needs assistive device   Motor Activity: No abnormal movement noted     Progress Toward Goals:  No change    Assessment/Plan    Principal Problem:    Schizoaffective disorder, bipolar type (HCC)      Recommended Treatment:    Continue Seroquel 50 mg q a m  and decrease night dose back to 450 mg  Continue lithium 300 mg q a m  and 450 mg q h s       Continue with pharmacotherapy, group therapy, milieu therapy and occupational therapy    The patient will be maintained on the following medications:    Current Facility-Administered Medications:  acetaminophen 650 mg Oral Q6H PRN ABILIO Kaur   acetaminophen 650 mg Oral Q4H PRN ABILIO Kaur   acetaminophen 975 mg Oral Q6H PRN Lopez Santiago [place], ABILIO   albuterol 2 puff Inhalation TID Kayden Valencia MD   aluminum-magnesium hydroxide-simethicone 30 mL Oral Q4H PRN Ledora Sow, CRNP   benztropine 1 mg Intramuscular BID PRN Ledora Sow, CRNP   benztropine 1 mg Oral BID PRN Ledora Sow, CRNP   EPINEPHrine PF 0 15 mg Intramuscular PRN Ceasar Roam, CRNP   fluticasone-vilanterol 1 puff Inhalation Daily Galina Zazueta MD   hydrOXYzine HCL 25 mg Oral Q6H PRN Ledora Sow, CRNP   levalbuterol 1 25 mg Nebulization Q8H PRN Kayden Valencia MD   Or       sodium chloride 3 mL Nebulization Q8H PRN Kayden Valencia MD   levothyroxine 125 mcg Oral Daily Vicky Ciminieri, CRNP   lithium carbonate 300 mg Oral Daily Bernett Course, CRNP   lithium carbonate 450 mg Oral HS Bernett Course, CRNP   LORazepam 1 mg Intramuscular Q4H PRN Ledora Sow, CRNP   LORazepam 0 5 mg Oral Q4H PRN Ledora Sow, CRNP   magnesium hydroxide 30 mL Oral Daily PRN Ledora Sow, CRNP   nicotine 14 mg Transdermal Daily Ceasar Roam, CRNP   nicotine polacrilex 2 mg Oral Q2H PRN Ledora Sow, CRNP   pantoprazole 20 mg Oral Daily Vicky Ciminieri, CRNP   predniSONE 5 mg Oral Daily Vicky Ciminieri, CRNP   QUEtiapine 475 mg Oral HS Yenni Sánchez PA-C   QUEtiapine 50 mg Oral Daily Nicolle Seals MD   theophylline 200 mg Oral Daily Vicky Ciminieri, CRNP   traZODone 25 mg Oral HS PRN Ledora Sow, CRNP       Risks, benefits and possible side effects of Medications:   Patient does not verbalize understanding at this time and will require further explanation

## 2019-02-25 NOTE — PROGRESS NOTES
Pt is calm, cooperative with care and medication compliant  Pt brightens upon approach, but states she feels " anxious about her situation"  Pt denies all other s/s including SI and HI  Pt remains visible on the unit and comes out for meals and groups  Pt will sit in the dayroom most of the shift and socialize with peers  Pt at times is intrusive with other pts care, but is redirectable  Pt is currently attending group  Will continue to monitor

## 2019-02-25 NOTE — CASE MANAGEMENT
Spoke with Yovanny Mullen at [address] to follow up on referral  She will come out to assess pt tomorrow at [address] with pt to let her know about [facility] meeting tomorrow afternoon  Pt expressed worry that this facility is located in an area that she doesn't like  CM reminded pt that this is the only viable option for [facility] placement right now and encouraged pt to be open-minded

## 2019-02-25 NOTE — PLAN OF CARE
Pt engages in groups yet was unable to complete either of the assignments today (setting a March goal and completing a relapse prevention plan )citing a need for extra time and was annoyed that  gave instructions throughout the session  Pt continues to rely on her doctor as her only source of "real " support ,in her perspective

## 2019-02-25 NOTE — PROGRESS NOTES
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

## 2019-02-26 PROCEDURE — 99232 SBSQ HOSP IP/OBS MODERATE 35: CPT | Performed by: NURSE PRACTITIONER

## 2019-02-26 RX ADMIN — LITHIUM CARBONATE EXTENDED-RELEASE TABLET 300 MG: 300 TABLET, FILM COATED, EXTENDED RELEASE ORAL at 08:14

## 2019-02-26 RX ADMIN — ALBUTEROL SULFATE 2 PUFF: 90 AEROSOL, METERED RESPIRATORY (INHALATION) at 13:29

## 2019-02-26 RX ADMIN — PREDNISONE 5 MG: 5 TABLET ORAL at 08:14

## 2019-02-26 RX ADMIN — PANTOPRAZOLE SODIUM 20 MG: 20 TABLET, DELAYED RELEASE ORAL at 06:25

## 2019-02-26 RX ADMIN — QUETIAPINE 50 MG: 50 TABLET ORAL at 08:14

## 2019-02-26 RX ADMIN — THEOPHYLLINE ANHYDROUS 200 MG: 200 CAPSULE, EXTENDED RELEASE ORAL at 08:14

## 2019-02-26 RX ADMIN — ALBUTEROL SULFATE 2 PUFF: 90 AEROSOL, METERED RESPIRATORY (INHALATION) at 19:13

## 2019-02-26 RX ADMIN — NICOTINE 14 MG: 14 PATCH, EXTENDED RELEASE TRANSDERMAL at 08:16

## 2019-02-26 RX ADMIN — ALBUTEROL SULFATE 2 PUFF: 90 AEROSOL, METERED RESPIRATORY (INHALATION) at 08:13

## 2019-02-26 RX ADMIN — FLUTICASONE FUROATE AND VILANTEROL TRIFENATATE 1 PUFF: 200; 25 POWDER RESPIRATORY (INHALATION) at 08:16

## 2019-02-26 RX ADMIN — LEVOTHYROXINE SODIUM 125 MCG: 125 TABLET ORAL at 06:25

## 2019-02-26 RX ADMIN — LITHIUM CARBONATE 450 MG: 450 TABLET, EXTENDED RELEASE ORAL at 21:56

## 2019-02-26 RX ADMIN — QUETIAPINE FUMARATE 450 MG: 50 TABLET, FILM COATED ORAL at 21:56

## 2019-02-26 NOTE — PLAN OF CARE
Problem: Alteration in Thoughts and Perception  Goal: Treatment Goal: Gain control of psychotic behaviors/thinking, reduce/eliminate presenting symptoms and demonstrate improved reality functioning upon discharge  Outcome: Progressing     Problem: Depression  Goal: Treatment Goal: Demonstrate behavioral control of depressive symptoms, verbalize feelings of improved mood/affect, and adopt new coping skills prior to discharge  Outcome: Progressing  Goal: Verbalize thoughts and feelings  Description  Interventions:  - Assess and re-assess patient's level of risk   - Engage patient in 1:1 interactions, daily, for a minimum of 15 minutes   - Encourage patient to express feelings, fears, frustrations, hopes    Outcome: Progressing  Goal: Refrain from harming self  Description  Interventions:  - Monitor patient closely, per order   - Supervise medication ingestion, monitor effects and side effects    Outcome: Progressing  Goal: Refrain from isolation  Description  Interventions:  - Develop a trusting relationship   - Encourage socialization    Outcome: Progressing  Goal: Refrain from self-neglect  Outcome: Progressing  Goal: Attend and participate in unit activities, including therapeutic, recreational, and educational groups  Description  Interventions:  - Provide therapeutic and educational activities daily, encourage attendance and participation, and document same in the medical record    Outcome: Progressing     Problem: Anxiety  Goal: Anxiety is at manageable level  Description  Interventions:  - Assess and monitor patient's anxiety level  - Monitor for signs and symptoms of anxiety both physical and emotional (heart palpitations, chest pain, shortness of breath, headaches, nausea, feeling jumpy, restlessness, irritable, apprehensive)  - Collaborate with interdisciplinary team and initiate plan and interventions as ordered    - [place] patient to unit/surroundings  - Explain treatment plan  - Encourage participation in care  - Encourage verbalization of concerns/fears  - Identify coping mechanisms  - Assist in developing anxiety-reducing skills  - Administer/offer alternative therapies  - Limit or eliminate stimulants   Outcome: Progressing     Problem: DISCHARGE PLANNING  Goal: Discharge to home or other facility with appropriate resources  Description  INTERVENTIONS:  - Identify barriers to discharge w/patient and caregiver  - Arrange for needed discharge resources and transportation as appropriate  - Identify discharge learning needs (meds, wound care, etc )  - Arrange for interpretive services to assist at discharge as needed  - Refer to Case Management Department for coordinating discharge planning if the patient needs post-hospital services based on physician/advanced practitioner order or complex needs related to functional status, cognitive ability, or social support system   Outcome: Progressing     Problem: Ineffective Coping  Goal: Participates in unit activities  Description  Interventions:  - Provide therapeutic environment   - Provide required programming   - Redirect inappropriate behaviors    Outcome: Progressing     Problem: Depression  Goal: Complete daily ADLs, including personal hygiene independently, as able  Description  Interventions:  - Observe, teach, and assist patient with ADLS  -  Monitor and promote a balance of rest/activity, with adequate nutrition and elimination    Outcome: Not Progressing

## 2019-02-26 NOTE — PROGRESS NOTES
Refused help with bathing  Would not shower  Present in the day room until after 10pm  States "I take my meds when I go to bed "  No AVH  No S/HI    Will monitor

## 2019-02-26 NOTE — PLAN OF CARE
Pt calmly and politely refused all groups today,stating that in her own best interest to self, she must have this day off to rest

## 2019-02-26 NOTE — CASE MANAGEMENT
CM called Janis at [facility] to follow up as she has not arrived on the unit yet for assessment  Blackwell Manisha stated that she ran into time crunch with her other meeting and is afraid she won't have enough time to do it today  CL expressed the urgency for this meeting and pt's placement  CM appropriately conveyed frustration with not getting a phone call back stating that she was cancelling the meeting today and stated that a heads up would have been appreciated   Chantell Matt and stated that she will come tomorrow at 1pm

## 2019-02-26 NOTE — PROGRESS NOTES
Awake, alert, oriented  Pleasant and cooperative on approach at this time  Medication compliant  Pt with no complaints at this time  Not voicing any suicidal or homicidal thoughts at this time  Currently resting calmly in dining room interacting appropriately with staff and peers  Will continue to monitor

## 2019-02-26 NOTE — PROGRESS NOTES
Progress Note - Behavioral Health   Selina Simon 66 y o  female MRN: [id number]  Unit/Bed#: [facility] 152-93 Encounter: [id number]    The patient was seen for continuing care and reviewed with treatment team   Staff reports the patient has been social   She has been demanding and attention seeking  She refused to shower yesterday  She is coming to be assessed by [facility] today for potential discharge to their facility  The patient is currently lying in her bed  She said she is having a lot of anxious thoughts so she wanted to stay in bed so she could calm herself down  She has various worries about the upcoming interview this afternoon including running out of oxygen  So she thought it would be better to use her room oxygen right now so that she does not chance needing a new canister for her portable tank  The patient continues with her delusions regarding her family members trying to find out where she is and where she is going  She is also hearing the voice of Xavi Roca trying to get information about where she is going  She is worried that somehow her transportation to the new facility will be compromised  The patient states she is sleeping and eating well  No suicidal thoughts  Mental Status Evaluation:  Appearance:  Good eye contact and disheveled   Behavior:  cooperative and friendly   Mood:  anxious   Affect: appropriate and broad   Speech: Rambling   Thought Process: Tangential   Thought Content:  Paranoid and mistrustful and Delusions of persecution   Perceptual Disturbances:  Auditory hallucinations without commands   Risk Potential: No suicidal or homicidal ideation   Attention/Concentration [place] than expected   Orientation:   Oriented x3   Gait/Station: Not observed   Motor Activity: No abnormal movement noted     Progress Toward Goals:  Approaching baseline    Assessment/Plan    Principal Problem:    [facility]ffective [facility] ([facility]      Recommended Treatment: Continue lithium 300 mg q a m  and 450 mg HS  Continue Seroquel 50 mg daily and 450 mg HS  Continue with pharmacotherapy, group therapy, milieu therapy and occupational therapy    The patient will be maintained on the following medications:    Current Facility-Administered Medications:  acetaminophen 650 mg Oral Q6H PRN Candy Fridge, CRNP   acetaminophen 650 mg Oral Q4H PRN Candy Fridge, CRNP   acetaminophen 975 mg Oral Q6H PRN Candy Fridge, CRNP   albuterol 2 puff Inhalation TID Gilda Bone MD   aluminum-magnesium hydroxide-simethicone 30 mL Oral Q4H PRN Candy Fridge, CRNP   benztropine 1 mg Intramuscular BID PRN Candy Fridge, CRNP   benztropine 1 mg Oral BID PRN Candy Fridge, CRNP   EPINEPHrine PF 0 15 mg Intramuscular PRN Radha Voss, CRNP   fluticasone-vilanterol 1 puff Inhalation Daily Ryan Bowman MD   hydrOXYzine HCL 25 mg Oral Q6H PRN Candy Fridge, CRNP   levalbuterol 1 25 mg Nebulization Q8H PRN Gilda Bone MD   Or       sodium chloride 3 mL Nebulization Q8H PRN Gilda Bone MD   levothyroxine 125 mcg Oral Daily Radha Voss, ABILIO   lithium carbonate 300 mg Oral Daily Fallon Rowland, CRNP   lithium carbonate 450 mg Oral HS Fallon Rowland, CRGAYLA   LORazepam 1 mg Intramuscular Q4H PRN Candy Fridge, CRNP   LORazepam 0 5 mg Oral Q4H PRN Candy Fridge, CRNP   magnesium hydroxide 30 mL Oral Daily PRN Candy Fridge, CRNP   nicotine 14 mg Transdermal Daily Radha Voss, CRGAYLA   nicotine polacrilex 2 mg Oral Q2H PRN Candy Fridge, CRNP   pantoprazole 20 mg Oral Daily Vicky Ciminifaisal, CRNP   predniSONE 5 mg Oral Daily Vicky Ciminifaisal, CRNP   QUEtiapine 450 mg Oral HS Fallon Rowland, CRGAYLA   QUEtiapine 50 mg Oral Daily Luh Gustafson MD   theophylline 200 mg Oral Daily Radha Voss, CRNP   traZODone 25 mg Oral HS PRN Lizzeth Witt, ABILIO       Risks, benefits and possible side effects of Medications:   Patient does not verbalize understanding at this time and will require further explanation

## 2019-02-26 NOTE — CASE MANAGEMENT
VM from Tiago Marroquin at [facility] stating that she forgot she had another meeting scheduled at 1pm and would need to come see pt earlier, between 11:30-12pm  CM called back and left LATRICIA stating that was okay and writer would let pt know

## 2019-02-27 PROCEDURE — 94760 N-INVAS EAR/PLS OXIMETRY 1: CPT

## 2019-02-27 PROCEDURE — 99232 SBSQ HOSP IP/OBS MODERATE 35: CPT | Performed by: NURSE PRACTITIONER

## 2019-02-27 PROCEDURE — 94640 AIRWAY INHALATION TREATMENT: CPT

## 2019-02-27 PROCEDURE — 94664 DEMO&/EVAL PT USE INHALER: CPT

## 2019-02-27 RX ADMIN — LEVALBUTEROL HYDROCHLORIDE 1.25 MG: 1.25 SOLUTION, CONCENTRATE RESPIRATORY (INHALATION) at 23:05

## 2019-02-27 RX ADMIN — FLUTICASONE FUROATE AND VILANTEROL TRIFENATATE 1 PUFF: 200; 25 POWDER RESPIRATORY (INHALATION) at 08:32

## 2019-02-27 RX ADMIN — PANTOPRAZOLE SODIUM 20 MG: 20 TABLET, DELAYED RELEASE ORAL at 06:12

## 2019-02-27 RX ADMIN — LITHIUM CARBONATE EXTENDED-RELEASE TABLET 300 MG: 300 TABLET, FILM COATED, EXTENDED RELEASE ORAL at 08:32

## 2019-02-27 RX ADMIN — LEVOTHYROXINE SODIUM 125 MCG: 125 TABLET ORAL at 06:12

## 2019-02-27 RX ADMIN — ALBUTEROL SULFATE 2 PUFF: 90 AEROSOL, METERED RESPIRATORY (INHALATION) at 13:42

## 2019-02-27 RX ADMIN — NICOTINE 14 MG: 14 PATCH, EXTENDED RELEASE TRANSDERMAL at 08:32

## 2019-02-27 RX ADMIN — QUETIAPINE 50 MG: 50 TABLET ORAL at 08:32

## 2019-02-27 RX ADMIN — THEOPHYLLINE ANHYDROUS 200 MG: 200 CAPSULE, EXTENDED RELEASE ORAL at 08:32

## 2019-02-27 RX ADMIN — ALBUTEROL SULFATE 2 PUFF: 90 AEROSOL, METERED RESPIRATORY (INHALATION) at 21:00

## 2019-02-27 RX ADMIN — PREDNISONE 5 MG: 5 TABLET ORAL at 08:32

## 2019-02-27 RX ADMIN — ALBUTEROL SULFATE 2 PUFF: 90 AEROSOL, METERED RESPIRATORY (INHALATION) at 08:30

## 2019-02-27 RX ADMIN — QUETIAPINE FUMARATE 450 MG: 50 TABLET, FILM COATED ORAL at 21:45

## 2019-02-27 RX ADMIN — LITHIUM CARBONATE 450 MG: 450 TABLET, EXTENDED RELEASE ORAL at 21:46

## 2019-02-27 NOTE — PLAN OF CARE
Problem: Alteration in Thoughts and Perception  Goal: Treatment Goal: Gain control of psychotic behaviors/thinking, reduce/eliminate presenting symptoms and demonstrate improved reality functioning upon discharge  Outcome: Progressing     Problem: Depression  Goal: Treatment Goal: Demonstrate behavioral control of depressive symptoms, verbalize feelings of improved mood/affect, and adopt new coping skills prior to discharge  Outcome: Progressing  Goal: Verbalize thoughts and feelings  Description  Interventions:  - Assess and re-assess patient's level of risk   - Engage patient in 1:1 interactions, daily, for a minimum of 15 minutes   - Encourage patient to express feelings, fears, frustrations, hopes    Outcome: Progressing  Goal: Refrain from harming self  Description  Interventions:  - Monitor patient closely, per order   - Supervise medication ingestion, monitor effects and side effects    Outcome: Progressing  Goal: Refrain from isolation  Description  Interventions:  - Develop a trusting relationship   - Encourage socialization    Outcome: Progressing  Goal: Refrain from self-neglect  Outcome: Progressing  Goal: Attend and participate in unit activities, including therapeutic, recreational, and educational groups  Description  Interventions:  - Provide therapeutic and educational activities daily, encourage attendance and participation, and document same in the medical record    Outcome: Progressing  Goal: Complete daily ADLs, including personal hygiene independently, as able  Description  Interventions:  - Observe, teach, and assist patient with ADLS  -  Monitor and promote a balance of rest/activity, with adequate nutrition and elimination    Outcome: Progressing     Problem: Anxiety  Goal: Anxiety is at manageable level  Description  Interventions:  - Assess and monitor patient's anxiety level     - Monitor for signs and symptoms of anxiety both physical and emotional (heart palpitations, chest pain, shortness of breath, headaches, nausea, feeling jumpy, restlessness, irritable, apprehensive)  - Collaborate with interdisciplinary team and initiate plan and interventions as ordered    - [place] patient to unit/surroundings  - Explain treatment plan  - Encourage participation in care  - Encourage verbalization of concerns/fears  - Identify coping mechanisms  - Assist in developing anxiety-reducing skills  - Administer/offer alternative therapies  - Limit or eliminate stimulants   Outcome: Progressing

## 2019-02-27 NOTE — PLAN OF CARE
Problem: Alteration in Thoughts and Perception  Goal: Treatment Goal: Gain control of psychotic behaviors/thinking, reduce/eliminate presenting symptoms and demonstrate improved reality functioning upon discharge  Outcome: Progressing     Problem: Depression  Goal: Treatment Goal: Demonstrate behavioral control of depressive symptoms, verbalize feelings of improved mood/affect, and adopt new coping skills prior to discharge  Outcome: Progressing  Goal: Verbalize thoughts and feelings  Description  Interventions:  - Assess and re-assess patient's level of risk   - Engage patient in 1:1 interactions, daily, for a minimum of 15 minutes   - Encourage patient to express feelings, fears, frustrations, hopes    2/26/2019 1957 by Katiana Sharma RN  Outcome: Progressing  2/26/2019 1900 by Katiana Sharma RN  Outcome: Progressing  Goal: Refrain from harming self  Description  Interventions:  - Monitor patient closely, per order   - Supervise medication ingestion, monitor effects and side effects    Outcome: Progressing  Goal: Refrain from isolation  Description  Interventions:  - Develop a trusting relationship   - Encourage socialization    Outcome: Progressing  Goal: Refrain from self-neglect  Outcome: Progressing  Goal: Attend and participate in unit activities, including therapeutic, recreational, and educational groups  Description  Interventions:  - Provide therapeutic and educational activities daily, encourage attendance and participation, and document same in the medical record    2/26/2019 1957 by Katiana Sharma RN  Outcome: Progressing  2/26/2019 1900 by Katiana Sharma RN  Outcome: Progressing     Problem: Anxiety  Goal: Anxiety is at manageable level  Description  Interventions:  - Assess and monitor patient's anxiety level     - Monitor for signs and symptoms of anxiety both physical and emotional (heart palpitations, chest pain, shortness of breath, headaches, nausea, feeling jumpy, restlessness, irritable, apprehensive)  - Collaborate with interdisciplinary team and initiate plan and interventions as ordered    - [place] patient to unit/surroundings  - Explain treatment plan  - Encourage participation in care  - Encourage verbalization of concerns/fears  - Identify coping mechanisms  - Assist in developing anxiety-reducing skills  - Administer/offer alternative therapies  - Limit or eliminate stimulants   2/26/2019 1957 by Andrea Marte RN  Outcome: Progressing  2/26/2019 1900 by Andrea Marte RN  Outcome: Progressing     Problem: DISCHARGE PLANNING  Goal: Discharge to home or other facility with appropriate resources  Description  INTERVENTIONS:  - Identify barriers to discharge w/patient and caregiver  - Arrange for needed discharge resources and transportation as appropriate  - Identify discharge learning needs (meds, wound care, etc )  - Arrange for interpretive services to assist at discharge as needed  - Refer to Case Management Department for coordinating discharge planning if the patient needs post-hospital services based on physician/advanced practitioner order or complex needs related to functional status, cognitive ability, or social support system   Outcome: Progressing     Problem: Ineffective Coping  Goal: Participates in unit activities  Description  Interventions:  - Provide therapeutic environment   - Provide required programming   - Redirect inappropriate behaviors    Outcome: Progressing     Problem: Depression  Goal: Complete daily ADLs, including personal hygiene independently, as able  Description  Interventions:  - Observe, teach, and assist patient with ADLS  -  Monitor and promote a balance of rest/activity, with adequate nutrition and elimination    Outcome: Not Progressing

## 2019-02-27 NOTE — PLAN OF CARE
Pt has returned to joining two of three groups today  She displays superficial planning for her future and continues to place all of her trust in the guidance of her mental health Physicians rather than in other behavioral health workers  Pt less critical of her environment today

## 2019-02-27 NOTE — PROGRESS NOTES
Pt is calm, brightens upon approach and is medication compliant  Pt is refusing to take a shower with much encouragement and multiple attempts by staff  Pt states " I will only take a PTA bath, I don't want to fall in your shower"  Pt denies all s/s including SI and HI  Pt is visible on the unit and comes out for meals and groups  Will continue to monitor and encourage hygiene

## 2019-02-27 NOTE — PROGRESS NOTES
Visible on unit  O2 continuous at 3L/min maintained  Med compliant  In the mix of the unit; social with certain peers and staff    No S/HI

## 2019-02-27 NOTE — CASE MANAGEMENT
Assessment meeting with Man Waldron completed  Pt was appropriate and asked good questions about [facility]  Pt stated that she thought the facility sounded good and would like to move forward  Liseth Dyer stated that they have an immediate opening and would like pt to come tour the facility  CM will coordinate plan for transportation and tour  Left  for [facility] [phone] to see if a virtual tour can be arranged since pt doesn't have /team to provide transport

## 2019-02-28 PROCEDURE — 99232 SBSQ HOSP IP/OBS MODERATE 35: CPT | Performed by: NURSE PRACTITIONER

## 2019-02-28 RX ADMIN — THEOPHYLLINE ANHYDROUS 200 MG: 200 CAPSULE, EXTENDED RELEASE ORAL at 08:27

## 2019-02-28 RX ADMIN — ALBUTEROL SULFATE 2 PUFF: 90 AEROSOL, METERED RESPIRATORY (INHALATION) at 13:57

## 2019-02-28 RX ADMIN — PANTOPRAZOLE SODIUM 20 MG: 20 TABLET, DELAYED RELEASE ORAL at 06:20

## 2019-02-28 RX ADMIN — LITHIUM CARBONATE EXTENDED-RELEASE TABLET 300 MG: 300 TABLET, FILM COATED, EXTENDED RELEASE ORAL at 08:27

## 2019-02-28 RX ADMIN — LITHIUM CARBONATE 450 MG: 450 TABLET, EXTENDED RELEASE ORAL at 21:50

## 2019-02-28 RX ADMIN — QUETIAPINE FUMARATE 450 MG: 50 TABLET, FILM COATED ORAL at 21:50

## 2019-02-28 RX ADMIN — LEVOTHYROXINE SODIUM 125 MCG: 125 TABLET ORAL at 06:20

## 2019-02-28 RX ADMIN — ALBUTEROL SULFATE 2 PUFF: 90 AEROSOL, METERED RESPIRATORY (INHALATION) at 19:48

## 2019-02-28 RX ADMIN — NICOTINE 14 MG: 14 PATCH, EXTENDED RELEASE TRANSDERMAL at 08:29

## 2019-02-28 RX ADMIN — FLUTICASONE FUROATE AND VILANTEROL TRIFENATATE 1 PUFF: 200; 25 POWDER RESPIRATORY (INHALATION) at 08:27

## 2019-02-28 RX ADMIN — ALBUTEROL SULFATE 2 PUFF: 90 AEROSOL, METERED RESPIRATORY (INHALATION) at 08:27

## 2019-02-28 RX ADMIN — PREDNISONE 5 MG: 5 TABLET ORAL at 08:27

## 2019-02-28 RX ADMIN — QUETIAPINE 50 MG: 50 TABLET ORAL at 08:27

## 2019-02-28 NOTE — DISCHARGE INSTR - APPOINTMENTS
A referral for ACT team services has been made on your behalf through [facility]  They will provide your with comprehensive services to support your mental, physical and social well-being  Phone: [phone]      Your ACT team has coordinated your oxygen supply/equipment through [facility]  You equipment will be at [id number] when you arrive tomorrow     [facility] contact: [phone]

## 2019-02-28 NOTE — CASE MANAGEMENT
Spoke to pt briefly about ACT team services  Pt interested and agreeable to services  Referral form completed and faxed to [facility]  DME and MA-51 completed and faxed to Bari Le at [facility]  CM left  for Janis [phone] to call back to discuss virtual tour vs in-person tour due to limitations

## 2019-02-28 NOTE — PROGRESS NOTES
Pt is cooperative with care and is medication compliant  Pt continues on 3L of O2 continuously  Pt advised that she was upset by the asthma attack that occurred last evening, stated that it felt like her spine was cutting off her oxygen  Pt advised she has fears about moving to [facility] because she found out that she has money and fears that her extended family might do something to jeopardize her life  Pt made comments about being on oxygen and is afraid that she might have to run down steps in the event of an emergency and is fearful that she won't be able to bring her oxygen tank along  Pt is present in the dayroom, social with peers  Pt is redirectable  Will monitor

## 2019-02-28 NOTE — PLAN OF CARE
Pt very self confident today in groups  She reported to peers that she is desired by many men in her life  Pt also reassured new peers that they can trust their unit doctors and has been very grateful for them

## 2019-02-28 NOTE — PROGRESS NOTES
Pt cooperative with care and medication compliant  Visible in mileu and social with select peers and staff  Denies sx including SI  Monitoring

## 2019-02-28 NOTE — PLAN OF CARE
Problem: Alteration in Thoughts and Perception  Goal: Treatment Goal: Gain control of psychotic behaviors/thinking, reduce/eliminate presenting symptoms and demonstrate improved reality functioning upon discharge  Outcome: Progressing     Problem: Depression  Goal: Treatment Goal: Demonstrate behavioral control of depressive symptoms, verbalize feelings of improved mood/affect, and adopt new coping skills prior to discharge  Outcome: Progressing  Goal: Verbalize thoughts and feelings  Description  Interventions:  - Assess and re-assess patient's level of risk   - Engage patient in 1:1 interactions, daily, for a minimum of 15 minutes   - Encourage patient to express feelings, fears, frustrations, hopes    Outcome: Progressing  Goal: Refrain from harming self  Description  Interventions:  - Monitor patient closely, per order   - Supervise medication ingestion, monitor effects and side effects    Outcome: Progressing  Goal: Refrain from isolation  Description  Interventions:  - Develop a trusting relationship   - Encourage socialization    Outcome: Progressing  Goal: Refrain from self-neglect  Outcome: Progressing  Goal: Attend and participate in unit activities, including therapeutic, recreational, and educational groups  Description  Interventions:  - Provide therapeutic and educational activities daily, encourage attendance and participation, and document same in the medical record    Outcome: Progressing  Goal: Complete daily ADLs, including personal hygiene independently, as able  Description  Interventions:  - Observe, teach, and assist patient with ADLS  -  Monitor and promote a balance of rest/activity, with adequate nutrition and elimination    Outcome: Progressing     Problem: Anxiety  Goal: Anxiety is at manageable level  Description  Interventions:  - Assess and monitor patient's anxiety level     - Monitor for signs and symptoms of anxiety both physical and emotional (heart palpitations, chest pain, shortness of breath, headaches, nausea, feeling jumpy, restlessness, irritable, apprehensive)  - Collaborate with interdisciplinary team and initiate plan and interventions as ordered    - [place] patient to unit/surroundings  - Explain treatment plan  - Encourage participation in care  - Encourage verbalization of concerns/fears  - Identify coping mechanisms  - Assist in developing anxiety-reducing skills  - Administer/offer alternative therapies  - Limit or eliminate stimulants   Outcome: Progressing     Problem: Ineffective Coping  Goal: Participates in unit activities  Description  Interventions:  - Provide therapeutic environment   - Provide required programming   - Redirect inappropriate behaviors    Outcome: Progressing

## 2019-02-28 NOTE — PROGRESS NOTES
Progress Note - Behavioral Health   Chad Meyer 66 y o  female MRN: [id number]  Unit/Bed#: Karissa Marmolejo 701-44 Encounter: [id number]    The patient was seen for continuing care and reviewed with treatment team   Staff reports the patient has been visible and social   She had a meeting with VAHID yesterday which went well  We are waiting for placement  The patient reported increased anxiety due to having an asthma attack last night  She said she had trouble sleeping because of this  This morning she was still anxious because she was worried about having to go to [facility] and was scared she was going to run out of oxygen  She reported feeling a weight coming off of her chest when I told her that they were trying to arrange a virtual tour instead  She still refuses to take a shower saying that she will only take a bed bath due to safety concerns  She is worried about being discharged to [facility] because she is worried that there are too many men there and also has concerns about whether they will have shower bars  Remains somewhat paranoid with other patients thinking that their hearing aids are to spy on her  Is also still worried about her step family members coming to find her after discharge  However she also said that she is trying to focus on the positives of this facility because they have a lot of what she wants including being close to the hospital   Did not report auditory hallucinations today  Mental Status Evaluation:  Appearance:  Good eye contact and disheveled   Behavior:  cooperative and friendly   Mood:  euthymic   Affect: appropriate   Speech: Normal rate and Normal volume   Thought Process:   Tangential   Thought Content:  Paranoid and mistrustful   Perceptual Disturbances: Denies hallucinations and does not appear to be responding to internal stimuli   Risk Potential: No suicidal or homicidal ideation   Attention/Concentration [place] than expected   Orientation:   Oriented x3 Gait/Station:  needs assistive device   Motor Activity: No abnormal movement noted     Progress Toward Goals:  Approaching baseline    Assessment/Plan    Principal Problem:    Schizoaffective disorder, bipolar type (HCC)      Recommended Treatment:     Continue Seroquel 50 mg q a m  and 450 mg q h s      Continue lithium 300 mg q a m  and 450 mg q h s         Continue with pharmacotherapy, group therapy, milieu therapy and occupational therapy    The patient will be maintained on the following medications:    Current Facility-Administered Medications:  acetaminophen 650 mg Oral Q6H PRN Makenzie Bobbi, CRNP   acetaminophen 650 mg Oral Q4H PRN Makenzie Bobbi, CRNP   acetaminophen 975 mg Oral Q6H PRN Makenzie Bobbi, CRNP   albuterol 2 puff Inhalation TID Janusz Ferrell MD   aluminum-magnesium hydroxide-simethicone 30 mL Oral Q4H PRN Makenzie Martines, CRNP   benztropine 1 mg Intramuscular BID PRN Makenzie Martines, CRNP   benztropine 1 mg Oral BID PRN Makenzie Martines, CRNP   EPINEPHrine PF 0 15 mg Intramuscular PRN Elli Davis, CRNP   fluticasone-vilanterol 1 puff Inhalation Daily Primitivo Vanegas MD   hydrOXYzine HCL 25 mg Oral Q6H PRN Makenzie Martines, CRNP   levalbuterol 1 25 mg Nebulization Q8H PRN Janusz Ferrell MD   Or       sodium chloride 3 mL Nebulization Q8H PRN Janusz Ferrell MD   levothyroxine 125 mcg Oral Daily Elli Davis, CRNP   lithium carbonate 300 mg Oral Daily Lockridge Bettencourt, CRNP   lithium carbonate 450 mg Oral HS Lockridge Bettencourt, CRNP   LORazepam 1 mg Intramuscular Q4H PRN Makenzie Martines, CRNP   LORazepam 0 5 mg Oral Q4H PRN Makenzie Martines, CRNP   magnesium hydroxide 30 mL Oral Daily PRN Makenzie Martines, CRNP   nicotine 14 mg Transdermal Daily Elli Davis, CRNP   nicotine polacrilex 2 mg Oral Q2H PRN Makenzie Martines, CRNP   pantoprazole 20 mg Oral Daily Vicky Ciminieri, CRNP   predniSONE 5 mg Oral Daily Vicky Ciminieri, CRNP   QUEtiapine 450 mg Oral HS Sharonda Bettencourt, CRNP QUEtiapine 50 mg Oral Daily Mary Alfaro MD   theophylline 200 mg Oral Daily ABILIO Nguyen   traZODone 25 mg Oral HS PRN ABILIO Gallardo       Risks, benefits and possible side effects of Medications:   Patient does not verbalize understanding at this time and will require further explanation

## 2019-02-28 NOTE — CASE MANAGEMENT
Called Janis @[facility] again [phone] and confirmed that DME and MA-51 forms were received this morning  CM asked if a virtual tour could be arranged and explained why an in-person tour would not be possible --  lack of community/family supports to assist with pt's day pass and transportation  Monon stated that she needs to get approval from her boss, Tamar, before giving CM an answer  She will give CM an update tomorrow

## 2019-03-01 PROCEDURE — 99232 SBSQ HOSP IP/OBS MODERATE 35: CPT | Performed by: NURSE PRACTITIONER

## 2019-03-01 RX ORDER — CALCIUM CARBONATE 200(500)MG
500 TABLET,CHEWABLE ORAL DAILY PRN
Status: DISCONTINUED | OUTPATIENT
Start: 2019-03-01 | End: 2019-03-20 | Stop reason: HOSPADM

## 2019-03-01 RX ADMIN — PREDNISONE 5 MG: 5 TABLET ORAL at 08:19

## 2019-03-01 RX ADMIN — LITHIUM CARBONATE EXTENDED-RELEASE TABLET 300 MG: 300 TABLET, FILM COATED, EXTENDED RELEASE ORAL at 08:19

## 2019-03-01 RX ADMIN — PANTOPRAZOLE SODIUM 20 MG: 20 TABLET, DELAYED RELEASE ORAL at 06:03

## 2019-03-01 RX ADMIN — QUETIAPINE FUMARATE 450 MG: 50 TABLET, FILM COATED ORAL at 21:39

## 2019-03-01 RX ADMIN — ALBUTEROL SULFATE 2 PUFF: 90 AEROSOL, METERED RESPIRATORY (INHALATION) at 08:19

## 2019-03-01 RX ADMIN — ALBUTEROL SULFATE 2 PUFF: 90 AEROSOL, METERED RESPIRATORY (INHALATION) at 21:38

## 2019-03-01 RX ADMIN — NICOTINE 14 MG: 14 PATCH, EXTENDED RELEASE TRANSDERMAL at 08:20

## 2019-03-01 RX ADMIN — LEVOTHYROXINE SODIUM 125 MCG: 125 TABLET ORAL at 06:03

## 2019-03-01 RX ADMIN — ALBUTEROL SULFATE 2 PUFF: 90 AEROSOL, METERED RESPIRATORY (INHALATION) at 13:11

## 2019-03-01 RX ADMIN — THEOPHYLLINE ANHYDROUS 200 MG: 200 CAPSULE, EXTENDED RELEASE ORAL at 08:19

## 2019-03-01 RX ADMIN — QUETIAPINE 50 MG: 50 TABLET ORAL at 08:19

## 2019-03-01 RX ADMIN — LITHIUM CARBONATE 450 MG: 450 TABLET, EXTENDED RELEASE ORAL at 21:40

## 2019-03-01 RX ADMIN — FLUTICASONE FUROATE AND VILANTEROL TRIFENATATE 1 PUFF: 200; 25 POWDER RESPIRATORY (INHALATION) at 08:19

## 2019-03-01 NOTE — CASE MANAGEMENT
Called [facility] [phone] looking for Janis to get update on their decision regarding virtual tour  Janis was not in the office -- transferred to Era Caro stated that she and Clarke Hernandez are waiting to hear back from their supervisor, Vinnie Apley, to get approval on virtual tour as well as figuring out how they can accommodate that  CM recommended FaceTime via cell phone and Era Caro stated that she would bring that idea up  They've also reached out to their IT dept to see if they can assist  CM requested a call back today with final answer as this continues to be a time sensitive matter  CM stated that FaceTime tour can be arranged today if they are agreeable  Era Caro proceeded to state that after tour is completed, they typically have people come for a trial visit (1-2 days) before the individual is officially accepted  CM inquired if that is the policy for pts on an inpatient psych unit and explained the hardship that process poses  Era Caro stated that they typically get referrals from [facility] where an overnight trial visit can be arranged fairly easy  CM requested that they make an exception for this case as circumstances are very different from [facility] referrals

## 2019-03-01 NOTE — PROGRESS NOTES
Patient came out to day room and was social with peers  No further racial comments or inappropriate behavior  Patient states she is looking forward to hearing about housing  No delusional conversation noted

## 2019-03-01 NOTE — PROGRESS NOTES
Pt is cooperative with care and is medication compliant  Pt advised she is looking forward to the [email or web address] tour of [facility]  Pt denies s/s  No delusional behavior noted, no paranoid statements made  Will monitor

## 2019-03-01 NOTE — PROGRESS NOTES
Progress Note - Behavioral Health   Alexandria Alu 66 y o  female MRN: [id number]  Unit/Bed#: Claudeen Cullens 923-39 Encounter: [id number]    The patient was seen for continuing care and reviewed with treatment team   Staff reports the patient was irritable last evening and verbally abusive toward staff  Continues to be entitled and demanding  Placement is pending  The patient has been increasingly behavioral the last few days most likely in reaction to discharge approaching  Patient was friendly and pleasant on approach however she stated now was not a good time for her to talk  She said she was still tired and did not feel fully awake yet even though it was late morning  She said she did sleep last night but did not feel like cooperating with other questions at that time  She was approached again later with the same reaction  Mental Status Evaluation:  Appearance:  Good eye contact and disheveled   Behavior:  Dismissive   Mood:  euthymic   Affect: appropriate   Speech: Normal rate and Normal volume   Thought Process: Tangential   Thought Content:  Did not spontaneously voice delusional material   Perceptual Disturbances: Denies hallucinations and does not appear to be responding to internal stimuli   Risk Potential: No suicidal or homicidal ideation   Attention/Concentration [place] than expected   Orientation:   Oriented x3   Gait/Station: Walks with oxygen tank   Motor Activity: No abnormal movement noted     Progress Toward Goals:  Approaching baseline    Assessment/Plan    Principal Problem:    Schizoaffective disorder, bipolar type (HCC)      Recommended Treatment:     Continue Seroquel 50 mg q a m  and 450 mg q h s      Continue lithium 300 mg q a m  and 450 mg q h s          Continue with pharmacotherapy, group therapy, milieu therapy and occupational therapy    The patient will be maintained on the following medications:    Current Facility-Administered Medications:  acetaminophen 650 mg Oral Q6H PRN Eliazar Larson, ABILIO   acetaminophen 650 mg Oral Q4H PRN Eliazar Larson, JOSE CARLOSNP   acetaminophen 975 mg Oral Q6H PRN ABILIO Gomez   albuterol 2 puff Inhalation TID Georgeana Carrel, MD   aluminum-magnesium hydroxide-simethicone 30 mL Oral Q4H PRN Eliazar Larson, ABILIO   benztropine 1 mg Intramuscular BID PRN Eliazar Larson, ABILIO   benztropine 1 mg Oral BID PRN ABILIO Gomez   EPINEPHrine PF 0 15 mg Intramuscular PRN Randy Mcmillan, ABILIO   fluticasone-vilanterol 1 puff Inhalation Daily Bernadine Bajwa MD   hydrOXYzine HCL 25 mg Oral Q6H PRN ABILIO Gomez   levalbuterol 1 25 mg Nebulization Q8H PRN Georgeana Carrel, MD   Or       sodium chloride 3 mL Nebulization Q8H PRN Georgeana Carrel, MD   levothyroxine 125 mcg Oral Daily Vicky Cimryan, CRNP   lithium carbonate 300 mg Oral Daily [facility], CRNP   lithium carbonate 450 mg Oral HS [facility], CRGAYLA   LORazepam 1 mg Intramuscular Q4H PRN ABILIO Gomez   LORazepam 0 5 mg Oral Q4H PRN ABILIO Gomez   magnesium hydroxide 30 mL Oral Daily PRN ABILIO Gomez   nicotine 14 mg Transdermal Daily Randy Mcmillan, CRGAYLA   nicotine polacrilex 2 mg Oral Q2H PRN ABILIO Gomez   pantoprazole 20 mg Oral Daily Vickyshailesh Fields, CRNP   predniSONE 5 mg Oral Daily Vicky Ciminifaisal, CRNP   QUEtiapine 450 mg Oral HS [facility], CRGAYLA   QUEtiapine 50 mg Oral Daily Ken Gerardo MD   theophylline 200 mg Oral Daily Randy Mcmillan, ABILIO   traZODone 25 mg Oral HS PRN ABILIO Gomez       Risks, benefits and possible side effects of Medications:   Patient does not verbalize understanding at this time and will require further explanation

## 2019-03-01 NOTE — PROGRESS NOTES
Pt began making racial slurs towards staff  Pt asked to go to room to de escalate  Pt was debriefed, situation improved  Will continue to monitor

## 2019-03-01 NOTE — PROGRESS NOTES
She is visible on the unit  Fixated on her O2 continuous which sjfabián wants tank replaced before it is even in red zone stating Dr specified that  She nis somewhat entitled bnut mostly cooperative and social with select peers  She is med compliant and denies S/H I  She states she remains anxious "about not being able to breathe"  Relaxarion techniques/restorative breathing declined    Will monitor

## 2019-03-02 PROCEDURE — 99232 SBSQ HOSP IP/OBS MODERATE 35: CPT | Performed by: PHYSICIAN ASSISTANT

## 2019-03-02 RX ADMIN — ALBUTEROL SULFATE 2 PUFF: 90 AEROSOL, METERED RESPIRATORY (INHALATION) at 08:50

## 2019-03-02 RX ADMIN — ALBUTEROL SULFATE 2 PUFF: 90 AEROSOL, METERED RESPIRATORY (INHALATION) at 20:00

## 2019-03-02 RX ADMIN — PANTOPRAZOLE SODIUM 20 MG: 20 TABLET, DELAYED RELEASE ORAL at 06:12

## 2019-03-02 RX ADMIN — LITHIUM CARBONATE EXTENDED-RELEASE TABLET 300 MG: 300 TABLET, FILM COATED, EXTENDED RELEASE ORAL at 08:49

## 2019-03-02 RX ADMIN — ALBUTEROL SULFATE 2 PUFF: 90 AEROSOL, METERED RESPIRATORY (INHALATION) at 13:45

## 2019-03-02 RX ADMIN — LITHIUM CARBONATE 450 MG: 450 TABLET, EXTENDED RELEASE ORAL at 21:21

## 2019-03-02 RX ADMIN — LEVOTHYROXINE SODIUM 125 MCG: 125 TABLET ORAL at 06:12

## 2019-03-02 RX ADMIN — FLUTICASONE FUROATE AND VILANTEROL TRIFENATATE 1 PUFF: 200; 25 POWDER RESPIRATORY (INHALATION) at 08:49

## 2019-03-02 RX ADMIN — THEOPHYLLINE ANHYDROUS 200 MG: 200 CAPSULE, EXTENDED RELEASE ORAL at 08:49

## 2019-03-02 RX ADMIN — QUETIAPINE 50 MG: 50 TABLET ORAL at 08:49

## 2019-03-02 RX ADMIN — QUETIAPINE FUMARATE 450 MG: 50 TABLET, FILM COATED ORAL at 21:21

## 2019-03-02 RX ADMIN — NICOTINE 14 MG: 14 PATCH, EXTENDED RELEASE TRANSDERMAL at 08:49

## 2019-03-02 RX ADMIN — PREDNISONE 5 MG: 5 TABLET ORAL at 08:49

## 2019-03-02 NOTE — PROGRESS NOTES
Progress Note - Behavioral Health   Sen Garvey 66 y o  female MRN: [id number]  Unit/Bed#: [facility]80 Encounter: [id number]    Subjective:  Per Nursing: Nursing staff reports that patient has been paranoid and anxious regarding her discharge  She has reported anxiety, but denies other symptoms  She has been calm and cooperative, and has been social with peers and staff in the unit  Patient has been compliant with her medications  Per Patient: Patient is initially bright, friendly and cooperative on approach  Patient reports that she is currently feeling "pretty good " She states that she slept well last night  The interview started very calmly, however, patient began to discuss an "episode" that occurred several nights ago, where she had a panic attack in the middle of the night due to "running out of oxygen" and "not being able to breathe " She stated that the episode caused her to cough frequently, and she is continuing to ruminate about it several days later  She states that she has increased anxiety and worry regarding the situation, and she is scared it is going to happen again  She states that there was not enough oxygen in the tank, and begins to perseverate on how frequently staff on the unit allow her oxygen tank to run out  She states that it is frequently on "the red line" and indicates that it is empty  She states that the coughing could have also been caused by eating chips with no top teeth  She then begins to speak at length about her anxiety regarding her discharge  She states repeatedly that she needs to be "delivered safely " She is very anxious that she will not be "delivered safely " She is trying to trust that staff is taking care of her, but she is scared  Patient denies any worsening depression or mood symptoms   She adamantly denies any active or passive suicidal ideation and states, "I want to live!" She denied any auditory or visual hallucinations, and she did not appear to be internally preoccupied  She stated that she is happy with her current medications and denied any negative side effects  When provider attempted to disengage and end the interview, the patient continued to perseverate about her oxygen tank and question the provider about her oxygen tank settings         Psychiatric Review of Systems:  Behavior over the last 24 hours: unchanged  Sleep: normal  Appetite: normal  Energy: Yes   Interest/Pleasure in Activities: Yes   Medication side effects: No  Anxiety/Panic Symptoms: Yes: increased anxiety regarding choking, oxygen tanks and discharge planning  Attention/Concentration Symptoms: No  Manic Symptoms: No  Delusional Ideations: Yes: delusions of persecution, paranoid delusions  Suicidal/Homicidal Ideation: No    Review of Systems:  Constitutional Negative   ENT Negative   Cardiovascular Negative   Respiratory Negative   Gastrointestinal Negative   Genitourinary Negative   Musculoskeletal Negative   Integumentary Negative   Neurological Negative   Endocrine Negative       Objective:  Vitals:  Vitals:    03/02/19 0743   BP: 95/51   Pulse: 68   Resp: 16   Temp: (!) 96 4 °F (35 8 °C)   SpO2: 99%     Weight (last 2 days)     None            Current Medications:  Current Facility-Administered Medications   Medication Dose Route Frequency    acetaminophen (TYLENOL) tablet 650 mg  650 mg Oral Q6H PRN    acetaminophen (TYLENOL) tablet 650 mg  650 mg Oral Q4H PRN    acetaminophen (TYLENOL) tablet 975 mg  975 mg Oral Q6H PRN    albuterol (PROVENTIL HFA,VENTOLIN HFA) inhaler 2 puff  2 puff Inhalation TID    benztropine (COGENTIN) injection 1 mg  1 mg Intramuscular BID PRN    benztropine (COGENTIN) tablet 1 mg  1 mg Oral BID PRN    calcium carbonate (TUMS) chewable tablet 500 mg  500 mg Oral Daily PRN    EPINEPHrine PF (ADRENALIN) 1 mg/mL injection 0 15 mg  0 15 mg Intramuscular PRN    fluticasone-vilanterol (BREO ELLIPTA) 200-25 MCG/INH inhaler 1 puff  1 puff Inhalation Daily  hydrOXYzine HCL (ATARAX) tablet 25 mg  25 mg Oral Q6H PRN    levalbuterol (XOPENEX) inhalation solution 1 25 mg  1 25 mg Nebulization Q8H PRN    Or    sodium chloride 0 9 % inhalation solution 3 mL  3 mL Nebulization Q8H PRN    levothyroxine tablet 125 mcg  125 mcg Oral Daily    lithium carbonate (LITHOBID) CR tablet 300 mg  300 mg Oral Daily    lithium carbonate (LITHOBID) CR tablet 450 mg  450 mg Oral HS    LORazepam (ATIVAN) 2 mg/mL injection 1 mg  1 mg Intramuscular Q4H PRN    LORazepam (ATIVAN) tablet 0 5 mg  0 5 mg Oral Q4H PRN    magnesium hydroxide (MILK OF MAGNESIA) 400 mg/5 mL oral suspension 30 mL  30 mL Oral Daily PRN    nicotine (NICODERM CQ) 14 mg/24hr TD 24 hr patch 14 mg  14 mg Transdermal Daily    nicotine polacrilex (NICORETTE) gum 2 mg  2 mg Oral Q2H PRN    pantoprazole (PROTONIX) EC tablet 20 mg  20 mg Oral Daily    predniSONE tablet 5 mg  5 mg Oral Daily    QUEtiapine (SEROquel) tablet 450 mg  450 mg Oral HS    QUEtiapine (SEROquel) tablet 50 mg  50 mg Oral Daily    theophylline (JEF-24) 24 hr capsule 200 mg  200 mg Oral Daily    traZODone (DESYREL) tablet 25 mg  25 mg Oral HS PRN         Behavioral Health Medications:   all current active meds have been reviewed, continue current psychiatric medications and current meds:   Current Facility-Administered Medications   Medication Dose Route Frequency    acetaminophen (TYLENOL) tablet 650 mg  650 mg Oral Q6H PRN    acetaminophen (TYLENOL) tablet 650 mg  650 mg Oral Q4H PRN    acetaminophen (TYLENOL) tablet 975 mg  975 mg Oral Q6H PRN    albuterol (PROVENTIL HFA,VENTOLIN HFA) inhaler 2 puff  2 puff Inhalation TID    benztropine (COGENTIN) injection 1 mg  1 mg Intramuscular BID PRN    benztropine (COGENTIN) tablet 1 mg  1 mg Oral BID PRN    calcium carbonate (TUMS) chewable tablet 500 mg  500 mg Oral Daily PRN    EPINEPHrine PF (ADRENALIN) 1 mg/mL injection 0 15 mg  0 15 mg Intramuscular PRN    fluticasone-vilanterol (BREO ELLIPTA) 200-25 MCG/INH inhaler 1 puff  1 puff Inhalation Daily    hydrOXYzine HCL (ATARAX) tablet 25 mg  25 mg Oral Q6H PRN    levalbuterol (XOPENEX) inhalation solution 1 25 mg  1 25 mg Nebulization Q8H PRN    Or    sodium chloride 0 9 % inhalation solution 3 mL  3 mL Nebulization Q8H PRN    levothyroxine tablet 125 mcg  125 mcg Oral Daily    lithium carbonate (LITHOBID) CR tablet 300 mg  300 mg Oral Daily    lithium carbonate (LITHOBID) CR tablet 450 mg  450 mg Oral HS    LORazepam (ATIVAN) 2 mg/mL injection 1 mg  1 mg Intramuscular Q4H PRN    LORazepam (ATIVAN) tablet 0 5 mg  0 5 mg Oral Q4H PRN    magnesium hydroxide (MILK OF MAGNESIA) 400 mg/5 mL oral suspension 30 mL  30 mL Oral Daily PRN    nicotine (NICODERM CQ) 14 mg/24hr TD 24 hr patch 14 mg  14 mg Transdermal Daily    nicotine polacrilex (NICORETTE) gum 2 mg  2 mg Oral Q2H PRN    pantoprazole (PROTONIX) EC tablet 20 mg  20 mg Oral Daily    predniSONE tablet 5 mg  5 mg Oral Daily    QUEtiapine (SEROquel) tablet 450 mg  450 mg Oral HS    QUEtiapine (SEROquel) tablet 50 mg  50 mg Oral Daily    theophylline (JEF-24) 24 hr capsule 200 mg  200 mg Oral Daily    traZODone (DESYREL) tablet 25 mg  25 mg Oral HS PRN     Laboratory results:    I have personally reviewed all pertinent laboratory/tests results    Last Laboratory Results:   Lab Results   Component Value Date    WBC 10 20 02/09/2019    RBC 4 12 02/09/2019    HGB 12 9 02/09/2019    HCT 39 9 02/09/2019     02/09/2019    RDW 14 2 02/09/2019    NEUTROABS 6 20 02/09/2019    SODIUM 138 02/09/2019    K 4 0 02/09/2019     02/09/2019    CO2 27 02/09/2019    BUN 11 02/09/2019    CREATININE 0 69 02/09/2019    GLUC 94 02/09/2019    CALCIUM 9 4 02/09/2019    AST 17 02/09/2019    ALT 18 02/09/2019    ALKPHOS 89 02/09/2019    TP 6 2 02/09/2019    ALB 3 7 02/09/2019    TBILI 0 50 02/09/2019         Mental Status Evaluation:  Appearance sitting comfortably in chair, cooperative with interview, poor hygiene /disheveled, unkempt, good eye contact , oddly related, breathing with the assistance of an oxygen concentrator, appears anxious at times when discussing her delusions, otherwise she is bright and pleasant and friendly upon approach   Mood "Pretty good"   Affect Appears generally euthymic, stable, mood-congruent   Speech Normal rate, rhythm, and volume   Thought Processes Perseverative and Tangential   Associations tangential associations   Hallucinations Denies any auditory or visual hallucinations   Thought Content No passive or active suicidal or homicidal ideation, intent, or plan , paranoid delusions, delusions of persecution, somatic preoccupation, ruminative about stressors, Future-oriented   Orientation Oriented to person, place, time, and situation   Recent and Remote Memory grossly intact   Attention Span concentration intact   Intellect Appears to be of Average Intelligence   Insight Limited insight   Judgement judgment was limited   Muscle Strength Muscle strength and tone were normal   Language Within normal limits   Fund of Knowledge Age appropriate   Pain none     Progress Toward Goals: Improving gradually    Code Status: Level 1 - Full Code    Strengths/Resources: Future-oriented    Limitations: Lack of interpersonal resources        Assessment/Plan   Principal Problem:    Schizoaffective disorder, bipolar type ([facility] [address] )            Recommended Treatment:   1  Continue with group therapy, milieu therapy and occupational therapy  2  Patient's mood appears stable, and she is bright and pleasant upon approach  Her anxiety persists and she is still expressing paranoia regarding her discharge and her oxygen tank  She does not express any active or passive suicidal ideation, intent or plan  Continue current medications as directed  3  Continue to monitor for increase in anxiety and paranoia, as well as for any mood symptoms    4  Work with Case Management to determine patient's disposition after discharge  Risks, benefits and possible side effects of Medications:   Risks, benefits, and possible side effects of medications explained to patient and patient verbalizes understanding  Patient is not an active risk of harm to self or others and contracts for safety  Patient is deemed appropriate for continuing inpatient level of care at this time            Meche Pantoja PA-C

## 2019-03-02 NOTE — PROGRESS NOTES
Pt was calm and cooperative with care  Pt was medication compliant  Pt reported anxiety, but denied all other symptoms  Pt remains paranoid and anxious related to discharge location  Pt was out in the milieu, social with peers  PT has a BMAT score of 3, ambulates independently throughout the unit  Pt remains intrusive and needy with staff  Will continue to monitor

## 2019-03-02 NOTE — PLAN OF CARE
Problem: Alteration in Thoughts and Perception  Goal: Treatment Goal: Gain control of psychotic behaviors/thinking, reduce/eliminate presenting symptoms and demonstrate improved reality functioning upon discharge  Outcome: Progressing     Problem: Depression  Goal: Treatment Goal: Demonstrate behavioral control of depressive symptoms, verbalize feelings of improved mood/affect, and adopt new coping skills prior to discharge  Outcome: Progressing  Goal: Verbalize thoughts and feelings  Description  Interventions:  - Assess and re-assess patient's level of risk   - Engage patient in 1:1 interactions, daily, for a minimum of 15 minutes   - Encourage patient to express feelings, fears, frustrations, hopes    Outcome: Progressing  Goal: Refrain from harming self  Description  Interventions:  - Monitor patient closely, per order   - Supervise medication ingestion, monitor effects and side effects    Outcome: Progressing  Goal: Refrain from isolation  Description  Interventions:  - Develop a trusting relationship   - Encourage socialization    Outcome: Progressing  Goal: Attend and participate in unit activities, including therapeutic, recreational, and educational groups  Description  Interventions:  - Provide therapeutic and educational activities daily, encourage attendance and participation, and document same in the medical record    Outcome: Progressing     Problem: Anxiety  Goal: Anxiety is at manageable level  Description  Interventions:  - Assess and monitor patient's anxiety level  - Monitor for signs and symptoms of anxiety both physical and emotional (heart palpitations, chest pain, shortness of breath, headaches, nausea, feeling jumpy, restlessness, irritable, apprehensive)  - Collaborate with interdisciplinary team and initiate plan and interventions as ordered    - [place] patient to unit/surroundings  - Explain treatment plan  - Encourage participation in care  - Encourage verbalization of concerns/fears  - Identify coping mechanisms  - Assist in developing anxiety-reducing skills  - Administer/offer alternative therapies  - Limit or eliminate stimulants   Outcome: Progressing     Problem: Depression  Goal: Refrain from self-neglect  Outcome: Not Progressing  Goal: Complete daily ADLs, including personal hygiene independently, as able  Description  Interventions:  - Observe, teach, and assist patient with ADLS  -  Monitor and promote a balance of rest/activity, with adequate nutrition and elimination    Outcome: Not Progressing

## 2019-03-02 NOTE — PLAN OF CARE
Problem: Alteration in Thoughts and Perception  Goal: Treatment Goal: Gain control of psychotic behaviors/thinking, reduce/eliminate presenting symptoms and demonstrate improved reality functioning upon discharge  Outcome: Progressing     Problem: Depression  Goal: Treatment Goal: Demonstrate behavioral control of depressive symptoms, verbalize feelings of improved mood/affect, and adopt new coping skills prior to discharge  Outcome: Progressing  Goal: Verbalize thoughts and feelings  Description  Interventions:  - Assess and re-assess patient's level of risk   - Engage patient in 1:1 interactions, daily, for a minimum of 15 minutes   - Encourage patient to express feelings, fears, frustrations, hopes    Outcome: Progressing  Goal: Refrain from harming self  Description  Interventions:  - Monitor patient closely, per order   - Supervise medication ingestion, monitor effects and side effects    Outcome: Progressing  Goal: Refrain from isolation  Description  Interventions:  - Develop a trusting relationship   - Encourage socialization    Outcome: Progressing  Goal: Refrain from self-neglect  Outcome: Progressing  Goal: Attend and participate in unit activities, including therapeutic, recreational, and educational groups  Description  Interventions:  - Provide therapeutic and educational activities daily, encourage attendance and participation, and document same in the medical record    Outcome: Progressing  Goal: Complete daily ADLs, including personal hygiene independently, as able  Description  Interventions:  - Observe, teach, and assist patient with ADLS  -  Monitor and promote a balance of rest/activity, with adequate nutrition and elimination    Outcome: Progressing     Problem: Anxiety  Goal: Anxiety is at manageable level  Description  Interventions:  - Assess and monitor patient's anxiety level     - Monitor for signs and symptoms of anxiety both physical and emotional (heart palpitations, chest pain, shortness of breath, headaches, nausea, feeling jumpy, restlessness, irritable, apprehensive)  - Collaborate with interdisciplinary team and initiate plan and interventions as ordered    - [place] patient to unit/surroundings  - Explain treatment plan  - Encourage participation in care  - Encourage verbalization of concerns/fears  - Identify coping mechanisms  - Assist in developing anxiety-reducing skills  - Administer/offer alternative therapies  - Limit or eliminate stimulants   Outcome: Progressing

## 2019-03-03 PROCEDURE — 94760 N-INVAS EAR/PLS OXIMETRY 1: CPT

## 2019-03-03 PROCEDURE — 99232 SBSQ HOSP IP/OBS MODERATE 35: CPT | Performed by: PHYSICIAN ASSISTANT

## 2019-03-03 PROCEDURE — 94640 AIRWAY INHALATION TREATMENT: CPT

## 2019-03-03 RX ADMIN — PANTOPRAZOLE SODIUM 20 MG: 20 TABLET, DELAYED RELEASE ORAL at 06:14

## 2019-03-03 RX ADMIN — LITHIUM CARBONATE EXTENDED-RELEASE TABLET 300 MG: 300 TABLET, FILM COATED, EXTENDED RELEASE ORAL at 08:33

## 2019-03-03 RX ADMIN — QUETIAPINE FUMARATE 450 MG: 50 TABLET, FILM COATED ORAL at 21:35

## 2019-03-03 RX ADMIN — LEVOTHYROXINE SODIUM 125 MCG: 125 TABLET ORAL at 06:14

## 2019-03-03 RX ADMIN — ALBUTEROL SULFATE 2 PUFF: 90 AEROSOL, METERED RESPIRATORY (INHALATION) at 13:35

## 2019-03-03 RX ADMIN — NICOTINE 14 MG: 14 PATCH, EXTENDED RELEASE TRANSDERMAL at 08:33

## 2019-03-03 RX ADMIN — LEVALBUTEROL HYDROCHLORIDE 1.25 MG: 1.25 SOLUTION, CONCENTRATE RESPIRATORY (INHALATION) at 18:22

## 2019-03-03 RX ADMIN — CALCIUM CARBONATE (ANTACID) CHEW TAB 500 MG 500 MG: 500 CHEW TAB at 13:39

## 2019-03-03 RX ADMIN — THEOPHYLLINE ANHYDROUS 200 MG: 200 CAPSULE, EXTENDED RELEASE ORAL at 08:33

## 2019-03-03 RX ADMIN — QUETIAPINE 50 MG: 50 TABLET ORAL at 08:33

## 2019-03-03 RX ADMIN — PREDNISONE 5 MG: 5 TABLET ORAL at 08:33

## 2019-03-03 RX ADMIN — FLUTICASONE FUROATE AND VILANTEROL TRIFENATATE 1 PUFF: 200; 25 POWDER RESPIRATORY (INHALATION) at 08:33

## 2019-03-03 RX ADMIN — ALBUTEROL SULFATE 2 PUFF: 90 AEROSOL, METERED RESPIRATORY (INHALATION) at 08:36

## 2019-03-03 RX ADMIN — LITHIUM CARBONATE 450 MG: 450 TABLET, EXTENDED RELEASE ORAL at 21:35

## 2019-03-03 NOTE — PROGRESS NOTES
Progress Note - Behavioral Health   Lizbeth Jhaveri 66 y o  female MRN: [id number]  Unit/Bed#: Deena Vickers 713-14 Encounter: [id number]    Subjective:  Per Nursing: Patient continues to express anxiety regarding her discharge to hospital staff  She has not shown signs of paranoia, per nursing  She has been calm and cooperative, and compliant with her medications  Per Patient: Patient is very distractible and having difficulty discussing one topic at a time  She states that she is having "colossal" anxiety  She states that she currently feels anxious and tired, despite having slept well last night  She begins to discuss anxiety surrounding her back pain and is worried that she has a compression fracture in her spine that is pressing on her kidneys  She also states that she has "dyspnea," and would like to have continual breathing treatments  She states that she does not have any increase in depression   When asked about suicidal ideation, she denies it and states that "I have too many thoughts about being alive " She stated that "those that don't want me alive give me colossal anxiety "       Psychiatric Review of Systems:  Behavior over the last 24 hours: unchanged  Sleep: normal  Appetite: normal  Energy: Yes   Interest/Pleasure in Activities: Yes   Medication side effects: No  Anxiety/Panic Symptoms: Yes   Attention/Concentration Symptoms: No  Manic Symptoms: Yes  Delusional Ideations: Yes: delusions of persecution, paranoid delusions, somatic preoccupation  Suicidal/Homicidal Ideation: No    Review of Systems:  Constitutional Feeling Tired   ENT Negative   Cardiovascular Negative   Respiratory As Noted in HPI   Gastrointestinal Negative   Genitourinary Negative   Musculoskeletal Negative   Integumentary Negative   Neurological Negative   Endocrine Negative       Objective:  Vitals:  Vitals:    03/03/19 0725   BP: 92/57   Pulse: 82   Resp: 16   Temp: 98 7 °F (37 1 °C)   SpO2: 99%     Weight (last 2 days)     None Current Medications:  Current Facility-Administered Medications   Medication Dose Route Frequency    acetaminophen (TYLENOL) tablet 650 mg  650 mg Oral Q6H PRN    acetaminophen (TYLENOL) tablet 650 mg  650 mg Oral Q4H PRN    acetaminophen (TYLENOL) tablet 975 mg  975 mg Oral Q6H PRN    albuterol (PROVENTIL HFA,VENTOLIN HFA) inhaler 2 puff  2 puff Inhalation TID    benztropine (COGENTIN) injection 1 mg  1 mg Intramuscular BID PRN    benztropine (COGENTIN) tablet 1 mg  1 mg Oral BID PRN    calcium carbonate (TUMS) chewable tablet 500 mg  500 mg Oral Daily PRN    EPINEPHrine PF (ADRENALIN) 1 mg/mL injection 0 15 mg  0 15 mg Intramuscular PRN    fluticasone-vilanterol (BREO ELLIPTA) 200-25 MCG/INH inhaler 1 puff  1 puff Inhalation Daily    hydrOXYzine HCL (ATARAX) tablet 25 mg  25 mg Oral Q6H PRN    levalbuterol (XOPENEX) inhalation solution 1 25 mg  1 25 mg Nebulization Q8H PRN    Or    sodium chloride 0 9 % inhalation solution 3 mL  3 mL Nebulization Q8H PRN    levothyroxine tablet 125 mcg  125 mcg Oral Daily    lithium carbonate (LITHOBID) CR tablet 300 mg  300 mg Oral Daily    lithium carbonate (LITHOBID) CR tablet 450 mg  450 mg Oral HS    LORazepam (ATIVAN) 2 mg/mL injection 1 mg  1 mg Intramuscular Q4H PRN    LORazepam (ATIVAN) tablet 0 5 mg  0 5 mg Oral Q4H PRN    magnesium hydroxide (MILK OF MAGNESIA) 400 mg/5 mL oral suspension 30 mL  30 mL Oral Daily PRN    nicotine (NICODERM CQ) 14 mg/24hr TD 24 hr patch 14 mg  14 mg Transdermal Daily    nicotine polacrilex (NICORETTE) gum 2 mg  2 mg Oral Q2H PRN    pantoprazole (PROTONIX) EC tablet 20 mg  20 mg Oral Daily    predniSONE tablet 5 mg  5 mg Oral Daily    QUEtiapine (SEROquel) tablet 450 mg  450 mg Oral HS    QUEtiapine (SEROquel) tablet 50 mg  50 mg Oral Daily    theophylline (JEF-24) 24 hr capsule 200 mg  200 mg Oral Daily    traZODone (DESYREL) tablet 25 mg  25 mg Oral HS PRN         Behavioral Health Medications:   all current active meds have been reviewed, continue current psychiatric medications and current meds:   Current Facility-Administered Medications   Medication Dose Route Frequency    acetaminophen (TYLENOL) tablet 650 mg  650 mg Oral Q6H PRN    acetaminophen (TYLENOL) tablet 650 mg  650 mg Oral Q4H PRN    acetaminophen (TYLENOL) tablet 975 mg  975 mg Oral Q6H PRN    albuterol (PROVENTIL HFA,VENTOLIN HFA) inhaler 2 puff  2 puff Inhalation TID    benztropine (COGENTIN) injection 1 mg  1 mg Intramuscular BID PRN    benztropine (COGENTIN) tablet 1 mg  1 mg Oral BID PRN    calcium carbonate (TUMS) chewable tablet 500 mg  500 mg Oral Daily PRN    EPINEPHrine PF (ADRENALIN) 1 mg/mL injection 0 15 mg  0 15 mg Intramuscular PRN    fluticasone-vilanterol (BREO ELLIPTA) 200-25 MCG/INH inhaler 1 puff  1 puff Inhalation Daily    hydrOXYzine HCL (ATARAX) tablet 25 mg  25 mg Oral Q6H PRN    levalbuterol (XOPENEX) inhalation solution 1 25 mg  1 25 mg Nebulization Q8H PRN    Or    sodium chloride 0 9 % inhalation solution 3 mL  3 mL Nebulization Q8H PRN    levothyroxine tablet 125 mcg  125 mcg Oral Daily    lithium carbonate (LITHOBID) CR tablet 300 mg  300 mg Oral Daily    lithium carbonate (LITHOBID) CR tablet 450 mg  450 mg Oral HS    LORazepam (ATIVAN) 2 mg/mL injection 1 mg  1 mg Intramuscular Q4H PRN    LORazepam (ATIVAN) tablet 0 5 mg  0 5 mg Oral Q4H PRN    magnesium hydroxide (MILK OF MAGNESIA) 400 mg/5 mL oral suspension 30 mL  30 mL Oral Daily PRN    nicotine (NICODERM CQ) 14 mg/24hr TD 24 hr patch 14 mg  14 mg Transdermal Daily    nicotine polacrilex (NICORETTE) gum 2 mg  2 mg Oral Q2H PRN    pantoprazole (PROTONIX) EC tablet 20 mg  20 mg Oral Daily    predniSONE tablet 5 mg  5 mg Oral Daily    QUEtiapine (SEROquel) tablet 450 mg  450 mg Oral HS    QUEtiapine (SEROquel) tablet 50 mg  50 mg Oral Daily    theophylline (JEF-24) 24 hr capsule 200 mg  200 mg Oral Daily    traZODone (DESYREL) tablet 25 mg  25 mg Oral HS PRN     Laboratory results:    I have personally reviewed all pertinent laboratory/tests results  Last Laboratory Results:   Lab Results   Component Value Date    SODIUM 138 02/09/2019    K 4 0 02/09/2019     02/09/2019    CO2 27 02/09/2019    BUN 11 02/09/2019    CREATININE 0 69 02/09/2019    GLUC 94 02/09/2019    CALCIUM 9 4 02/09/2019    LITHIUM 1 0 02/22/2019       Mental Status Evaluation:  Appearance sitting comfortably in chair, cooperative with interview, poor hygiene /disheveled, good eye contact , oddly related, very intrusive, appears anxious but is occasionally laughing and smiling, highly distractible   Mood "colossal anxiety"   Affect Appears generally euthymic, stable, mood-congruent   Speech Normal rate, rhythm, and volume   Thought Processes over-inclusive, disorganized, Perseverative and Tangential   Associations tangential associations   Hallucinations Denies any auditory or visual hallucinations and does not appear internally preoccupied   Thought Content No passive or active suicidal or homicidal ideation, intent, or plan , referential delusions, paranoid delusions, ruminative about stressors, help-seeking, Future-oriented and delusions of persecution, somatically preoccupied   Orientation Oriented to person, place, time, and situation   Recent and Remote Memory grossly intact   Attention Span concentration intact   Intellect Appears to be of Average Intelligence   Insight Limited insight   Judgement judgment was limited   Muscle Strength Muscle strength and tone were normal   Language Within normal limits   Fund of Knowledge Age appropriate   Pain none     Progress Toward Goals: Gradually improving    Code Status: Level 1 - Full Code    Strengths/Resources: Future-oriented and help-seeking    Limitations: Lack of interpersonal resources        Assessment/Plan   Principal Problem:    Schizoaffective disorder, bipolar type ([facility] [address] )            Recommended Treatment:   1  Continue with group therapy, milieu therapy and occupational therapy  2  Patient displays stable mood symptoms, although she continues to have anxiety regarding somatic delusions and paranoid ideation  She is anxious about her impending discharge  She denies active or passive suicidal ideation and is still bright and friendly upon approach  Continue current medications as directed  3  Continue to monitor for increase in paranoia and anxiety  4  Work with Case Management to determine patient's disposition after discharge  Risks, benefits and possible side effects of Medications:   Risks, benefits, and possible side effects of medications explained to patient and patient verbalizes understanding  Patient is not an active risk of harm to self or others and contracts for safety  Patient is deemed appropriate for continuing inpatient level of care at this time            Meche Pantoja PA-C

## 2019-03-03 NOTE — PLAN OF CARE
Problem: Alteration in Thoughts and Perception  Goal: Treatment Goal: Gain control of psychotic behaviors/thinking, reduce/eliminate presenting symptoms and demonstrate improved reality functioning upon discharge  Outcome: Progressing     Problem: Depression  Goal: Treatment Goal: Demonstrate behavioral control of depressive symptoms, verbalize feelings of improved mood/affect, and adopt new coping skills prior to discharge  Outcome: Progressing  Goal: Verbalize thoughts and feelings  Description  Interventions:  - Assess and re-assess patient's level of risk   - Engage patient in 1:1 interactions, daily, for a minimum of 15 minutes   - Encourage patient to express feelings, fears, frustrations, hopes    Outcome: Progressing  Goal: Refrain from harming self  Description  Interventions:  - Monitor patient closely, per order   - Supervise medication ingestion, monitor effects and side effects    Outcome: Progressing  Goal: Refrain from isolation  Description  Interventions:  - Develop a trusting relationship   - Encourage socialization    Outcome: Progressing  Goal: Refrain from self-neglect  Outcome: Progressing  Goal: Attend and participate in unit activities, including therapeutic, recreational, and educational groups  Description  Interventions:  - Provide therapeutic and educational activities daily, encourage attendance and participation, and document same in the medical record    Outcome: Progressing  Goal: Complete daily ADLs, including personal hygiene independently, as able  Description  Interventions:  - Observe, teach, and assist patient with ADLS  -  Monitor and promote a balance of rest/activity, with adequate nutrition and elimination    Outcome: Progressing     Problem: Anxiety  Goal: Anxiety is at manageable level  Description  Interventions:  - Assess and monitor patient's anxiety level     - Monitor for signs and symptoms of anxiety both physical and emotional (heart palpitations, chest pain, shortness of breath, headaches, nausea, feeling jumpy, restlessness, irritable, apprehensive)  - Collaborate with interdisciplinary team and initiate plan and interventions as ordered    - [place] patient to unit/surroundings  - Explain treatment plan  - Encourage participation in care  - Encourage verbalization of concerns/fears  - Identify coping mechanisms  - Assist in developing anxiety-reducing skills  - Administer/offer alternative therapies  - Limit or eliminate stimulants   Outcome: Progressing

## 2019-03-03 NOTE — PROGRESS NOTES
Pt was calm and cooperative with care  Pt was medication compliant  Pt reported anxiety related to discharge, but denied all other symptoms  Pt was out in the milieu, social with peers  Pt made multiple somatic complaints to other staff, but when this writer approached her, she denied all complaints  No delusions or paranoia noted  Will continue to monitor

## 2019-03-04 PROCEDURE — 99232 SBSQ HOSP IP/OBS MODERATE 35: CPT | Performed by: NURSE PRACTITIONER

## 2019-03-04 RX ADMIN — ALBUTEROL SULFATE 2 PUFF: 90 AEROSOL, METERED RESPIRATORY (INHALATION) at 08:38

## 2019-03-04 RX ADMIN — ALBUTEROL SULFATE 2 PUFF: 90 AEROSOL, METERED RESPIRATORY (INHALATION) at 19:45

## 2019-03-04 RX ADMIN — ALBUTEROL SULFATE 2 PUFF: 90 AEROSOL, METERED RESPIRATORY (INHALATION) at 14:21

## 2019-03-04 RX ADMIN — QUETIAPINE 50 MG: 50 TABLET ORAL at 08:39

## 2019-03-04 RX ADMIN — THEOPHYLLINE ANHYDROUS 200 MG: 200 CAPSULE, EXTENDED RELEASE ORAL at 08:39

## 2019-03-04 RX ADMIN — PREDNISONE 5 MG: 5 TABLET ORAL at 08:39

## 2019-03-04 RX ADMIN — QUETIAPINE FUMARATE 450 MG: 50 TABLET, FILM COATED ORAL at 21:42

## 2019-03-04 RX ADMIN — LITHIUM CARBONATE 450 MG: 450 TABLET, EXTENDED RELEASE ORAL at 21:46

## 2019-03-04 RX ADMIN — FLUTICASONE FUROATE AND VILANTEROL TRIFENATATE 1 PUFF: 200; 25 POWDER RESPIRATORY (INHALATION) at 08:38

## 2019-03-04 RX ADMIN — LITHIUM CARBONATE EXTENDED-RELEASE TABLET 300 MG: 300 TABLET, FILM COATED, EXTENDED RELEASE ORAL at 08:39

## 2019-03-04 RX ADMIN — NICOTINE 14 MG: 14 PATCH, EXTENDED RELEASE TRANSDERMAL at 08:40

## 2019-03-04 RX ADMIN — LEVOTHYROXINE SODIUM 125 MCG: 125 TABLET ORAL at 06:22

## 2019-03-04 RX ADMIN — PANTOPRAZOLE SODIUM 20 MG: 20 TABLET, DELAYED RELEASE ORAL at 06:22

## 2019-03-04 NOTE — CASE MANAGEMENT
Spoke with Elenore Hashimoto [phone] regarding tour  She stated that their supervisor is not comfortable with doing a virtual tour and they contacted [facility] to see what the other options are  CM stated that approval was given for [facility] staff to accompany pt on an in-person tour, if necessary, and tour can be arranged as early as tomorrow  Era Caro stated that Friday 3/8 is the only day that will work for them this week  Tour is tentatively scheduled for 10:30am that day  CM left VM for [facility] Patient Care Manager, Sima Michaels  to see if that day/time will work for her staff  CM will follow up with Era Caro as soon as Blade Russo gives confirmation

## 2019-03-04 NOTE — PROGRESS NOTES
Progress Note - Behavioral Health   Grecia Dooley 66 y o  female MRN: [id number]  Unit/Bed#: Vandana Mann 057-25 Encounter: [id number]    The patient was seen for continuing care and reviewed with treatment team   Staff reports the patient has been  anxious related to her upcoming discharge  She remains somatic, delusional and entitled  We are waiting for placement  The patient reports increased anxiety today because she said she had problems breathing last night  She is also feeling uncomfortable because she thinks another patient is staring at her  She is somewhat paranoid about certain staff members thinking they are here to give information about her to her sister  She is tolerating medications well  Sleep and appetite have been okay  No SI  Mental Status Evaluation:  Appearance:  Good eye contact and disheveled   Behavior:  cooperative and friendly   Mood:  anxious   Affect: appropriate   Speech: rambling   Thought Process: Tangential   Thought Content:  Paranoid and mistrustful and Delusions of persecution   Perceptual Disturbances: Auditory hallucinations without commands   Risk Potential: No suicidal or homicidal ideation   Attention/Concentration [place] than expected   Orientation:   Oriented x3   Gait/Station: Not observed   Motor Activity: No abnormal movement noted     Progress Toward Goals:  Approaching baseline    Assessment/Plan    Principal Problem:    Schizoaffective disorder, bipolar type (HCC)      Recommended Treatment:     Continue Seroquel 50 mg q a m  and 450 mg HS  Continue lithium 300 mg q a m  and 450 mg HS  Continue with pharmacotherapy, group therapy, milieu therapy and occupational therapy    The patient will be maintained on the following medications:    Current Facility-Administered Medications:  acetaminophen 650 mg Oral Q6H PRN ABILIO Rice   acetaminophen 650 mg Oral Q4H PRN ABILIO Rice   acetaminophen 975 mg Oral Q6H PRN ABILIO Rice albuterol 2 puff Inhalation TID Richie Roger MD   benztropine 1 mg Intramuscular BID PRN Ariana Whiteside, CRNP   benztropine 1 mg Oral BID PRN Ariana Whiteside, CRNP   calcium carbonate 500 mg Oral Daily PRN Jerica Victoria MD   EPINEPHrine PF 0 15 mg Intramuscular PRN Vicky Ciminieri, CRNP   fluticasone-vilanterol 1 puff Inhalation Daily Delroy Nuñez MD   hydrOXYzine HCL 25 mg Oral Q6H PRN Ariana Whiteside, CRNP   levalbuterol 1 25 mg Nebulization Q8H PRN Richie Roger MD   Or       sodium chloride 3 mL Nebulization Q8H PRN Richie Roger MD   levothyroxine 125 mcg Oral Daily Vicky Ciminieri, CRNP   lithium carbonate 300 mg Oral Daily Denise Tree, CRNP   lithium carbonate 450 mg Oral HS Denise North, CRNP   LORazepam 1 mg Intramuscular Q4H PRN Ariana Whiteside, CRNP   LORazepam 0 5 mg Oral Q4H PRN Ariana Whiteside, CRNP   magnesium hydroxide 30 mL Oral Daily PRN Ariana Whiteside, CRNP   nicotine 14 mg Transdermal Daily Jagruti Dapper, CRNP   nicotine polacrilex 2 mg Oral Q2H PRN Ariana Whiteside, CRNP   pantoprazole 20 mg Oral Daily Vicky Ciminieri, CRNP   predniSONE 5 mg Oral Daily Vicky Ciminieri, CRNP   QUEtiapine 450 mg Oral HS Denise Tree, CRNP   QUEtiapine 50 mg Oral Daily Sasha Kim MD   theophylline 200 mg Oral Daily Jagruti Dapper, CRNP   traZODone 25 mg Oral HS PRN Ariana Whiteside, CRNP       Risks, benefits and possible side effects of Medications:   Patient does not verbalize understanding at this time and will require further explanation

## 2019-03-04 NOTE — NURSING NOTE
Patient is calm and cooperative  Reports anxiety related to D/C  No signs of paranoia or delusions  Pt  Is medication compliant

## 2019-03-04 NOTE — CASE MANAGEMENT
Left  dl Bruce or Paulina Watkins to determine whether or not a virtual tour will be possible  If they do not approve virtual tour, CM was given approval to coordinate an in-person tour with assistance from [facility] staff   CM will follow up

## 2019-03-04 NOTE — CASE MANAGEMENT
CM called [facility] [phone] again looking for Janis or Milton Pope to discuss and coordinate pt's tour  Spoke to Marlee Leblanc who stated that [place] Mullen is out of the office today and Milton Pope might be coming in later this afternoon around 1-1:30pm  CM inquired if Marlee Leblanc knew anything about this referral or could assist with scheduling a tour in [facility] and Darline's absence  Marlee Leblanc stated that she is not authorized to do so and encouraged CM to call back this after to speak with Milton Pope

## 2019-03-04 NOTE — PLAN OF CARE
Pt cooperative in all groups yet  needed redirection away from blaming peers for unit challenges of her own

## 2019-03-04 NOTE — PROGRESS NOTES
Pt is medication compliant and cooperative  Pt advised staff this morning that "she thinks that she was poisoned in her dinner or that tums interfered with her meds"  Pt advised that she needed a breathing treatment last evening and that it upset her  Pt is present in the dayroom  Pt remains on 3L of continuous O2  Pt denies s/s at this time  Will continue to monitor

## 2019-03-05 PROCEDURE — 99232 SBSQ HOSP IP/OBS MODERATE 35: CPT | Performed by: NURSE PRACTITIONER

## 2019-03-05 RX ORDER — PREDNISONE 2.5 MG
2.5 TABLET ORAL DAILY
Status: DISCONTINUED | OUTPATIENT
Start: 2019-03-06 | End: 2019-03-11

## 2019-03-05 RX ADMIN — LITHIUM CARBONATE 450 MG: 450 TABLET, EXTENDED RELEASE ORAL at 22:00

## 2019-03-05 RX ADMIN — QUETIAPINE FUMARATE 450 MG: 50 TABLET, FILM COATED ORAL at 22:00

## 2019-03-05 RX ADMIN — ALBUTEROL SULFATE 2 PUFF: 90 AEROSOL, METERED RESPIRATORY (INHALATION) at 08:54

## 2019-03-05 RX ADMIN — LITHIUM CARBONATE EXTENDED-RELEASE TABLET 300 MG: 300 TABLET, FILM COATED, EXTENDED RELEASE ORAL at 08:49

## 2019-03-05 RX ADMIN — NICOTINE 14 MG: 14 PATCH, EXTENDED RELEASE TRANSDERMAL at 08:49

## 2019-03-05 RX ADMIN — ALBUTEROL SULFATE 2 PUFF: 90 AEROSOL, METERED RESPIRATORY (INHALATION) at 19:27

## 2019-03-05 RX ADMIN — ALBUTEROL SULFATE 2 PUFF: 90 AEROSOL, METERED RESPIRATORY (INHALATION) at 13:55

## 2019-03-05 RX ADMIN — THEOPHYLLINE ANHYDROUS 200 MG: 200 CAPSULE, EXTENDED RELEASE ORAL at 08:49

## 2019-03-05 RX ADMIN — PREDNISONE 5 MG: 5 TABLET ORAL at 08:48

## 2019-03-05 RX ADMIN — QUETIAPINE 50 MG: 50 TABLET ORAL at 08:49

## 2019-03-05 RX ADMIN — FLUTICASONE FUROATE AND VILANTEROL TRIFENATATE 1 PUFF: 200; 25 POWDER RESPIRATORY (INHALATION) at 08:52

## 2019-03-05 NOTE — PLAN OF CARE
Problem: Alteration in Thoughts and Perception  Goal: Treatment Goal: Gain control of psychotic behaviors/thinking, reduce/eliminate presenting symptoms and demonstrate improved reality functioning upon discharge  Outcome: Progressing     Problem: Depression  Goal: Treatment Goal: Demonstrate behavioral control of depressive symptoms, verbalize feelings of improved mood/affect, and adopt new coping skills prior to discharge  Outcome: Progressing  Goal: Verbalize thoughts and feelings  Description  Interventions:  - Assess and re-assess patient's level of risk   - Engage patient in 1:1 interactions, daily, for a minimum of 15 minutes   - Encourage patient to express feelings, fears, frustrations, hopes    Outcome: Progressing  Goal: Refrain from harming self  Description  Interventions:  - Monitor patient closely, per order   - Supervise medication ingestion, monitor effects and side effects    Outcome: Progressing  Goal: Refrain from isolation  Description  Interventions:  - Develop a trusting relationship   - Encourage socialization    Outcome: Progressing  Goal: Refrain from self-neglect  Outcome: Progressing  Goal: Attend and participate in unit activities, including therapeutic, recreational, and educational groups  Description  Interventions:  - Provide therapeutic and educational activities daily, encourage attendance and participation, and document same in the medical record    Outcome: Progressing     Problem: Anxiety  Goal: Anxiety is at manageable level  Description  Interventions:  - Assess and monitor patient's anxiety level  - Monitor for signs and symptoms of anxiety both physical and emotional (heart palpitations, chest pain, shortness of breath, headaches, nausea, feeling jumpy, restlessness, irritable, apprehensive)  - Collaborate with interdisciplinary team and initiate plan and interventions as ordered    - [place] patient to unit/surroundings  - Explain treatment plan  - Encourage participation in care  - Encourage verbalization of concerns/fears  - Identify coping mechanisms  - Assist in developing anxiety-reducing skills  - Administer/offer alternative therapies  - Limit or eliminate stimulants   Outcome: Progressing     Problem: DISCHARGE PLANNING  Goal: Discharge to home or other facility with appropriate resources  Description  INTERVENTIONS:  - Identify barriers to discharge w/patient and caregiver  - Arrange for needed discharge resources and transportation as appropriate  - Identify discharge learning needs (meds, wound care, etc )  - Arrange for interpretive services to assist at discharge as needed  - Refer to Case Management Department for coordinating discharge planning if the patient needs post-hospital services based on physician/advanced practitioner order or complex needs related to functional status, cognitive ability, or social support system   Outcome: Progressing     Problem: Ineffective Coping  Goal: Participates in unit activities  Description  Interventions:  - Provide therapeutic environment   - Provide required programming   - Redirect inappropriate behaviors    Outcome: Progressing     Problem: Depression  Goal: Attend and participate in unit activities, including therapeutic, recreational, and educational groups  Description  Interventions:  - Provide therapeutic and educational activities daily, encourage attendance and participation, and document same in the medical record    Outcome: Progressing

## 2019-03-05 NOTE — CASE MANAGEMENT
Email confirmation from Gab Nance [facility] [address] stating that they can assist with pt's [employer] tour on Friday, 3/8, at 10:30am  She requested that CM reach out to [facility] ([phone]) to coordinate the time/logistics for transport  CM called Sol -- they will have a  and [facility] MHT  pt between 10-10:15am on Friday  They will need a MHT from [facility] to accompany pt as well  Met with pt briefly to give update on tour  CM stated that an in-person tour has been arranged for Friday and pt will be accompanied by 2 MHT staff and a [employer]  to ensure a safe trip there and back  Pt took the news very well and was thankful for the update  Pt would like to ensure that she'll have a full supply of oxygen for the trip

## 2019-03-05 NOTE — PROGRESS NOTES
Patient somatic , paranoid   Fixated on albuterol inhaler this am  Request for a new one still patient do not believe so this writer showed her request to pharmacy  States '' they tell me that they ordered but they never do and I don't want to die ''  Patient denies depression and anxiety   Denies SI/HI  Will continue to monitor

## 2019-03-05 NOTE — PLAN OF CARE
Pt cooperative in all groups ;needed occasional redirection away from attempts to monopolize group conversations

## 2019-03-05 NOTE — PROGRESS NOTES
Progress Note - Behavioral Health   Nam Camacho 66 y o  female MRN: [id number]  Unit/Bed#: Anson Gomez 696-69 Encounter: [id number]    The patient was seen for continuing care and reviewed with treatment team   Staff reports the patient remains somatic and anxious due to upcoming discharge  We are waiting for placement  The patient will have a tour at [facility] on Friday  The patient brightens on approach and is friendly  She is currently fixated on another patient who she states is "a troublemaker"  Remains paranoid and anxious about her upcoming tour at [facility] thinking that her oxygen is going to run out  She is also worried about the safety of the transportation and being locked down in the [employer]  Worried that the wrong people will find out where she is going  Reports auditory hallucinations of a lot of bad things mainly concerning plots to murder her or others  Mental Status Evaluation:  Appearance:  Good eye contact and disheveled   Behavior:  calm and friendly   Mood:  anxious   Affect: appropriate   Speech: Rambling   Thought Process: Tangential   Thought Content:  Paranoid and mistrustful and Delusions of persecution   Perceptual Disturbances: Auditory hallucinations without commands   Risk Potential: No suicidal or homicidal ideation   Attention/Concentration [place] than expected   Orientation:   Oriented x3   Gait/Station: Not observed   Motor Activity: Mild hand tremor     Progress Toward Goals:  Approaching baseline    Assessment/Plan    Principal Problem:    Schizoaffective disorder, bipolar type (HCC)      Recommended Treatment:     Continue Seroquel 50 mg q a m  and 450 mg HS        Continue lithium 300 mg q a m  and 450 mg HS  Continue with pharmacotherapy, group therapy, milieu therapy and occupational therapy    The patient will be maintained on the following medications:    Current Facility-Administered Medications:  acetaminophen 650 mg Oral Q6H PRN ABILIO Avila acetaminophen 650 mg Oral Q4H PRN Augustina Cuba, CRNP   acetaminophen 975 mg Oral Q6H PRN Augustina Arun, CRNP   albuterol 2 puff Inhalation TID Eligio Vail MD   benztropine 1 mg Intramuscular BID PRN Augustina Cuba, CRNP   benztropine 1 mg Oral BID PRN Augustina Cuba, CRNP   calcium carbonate 500 mg Oral Daily PRN Wanda Barba MD   EPINEPHrine PF 0 15 mg Intramuscular PRN Britt Sos, CRNP   fluticasone-vilanterol 1 puff Inhalation Daily Sammie Jaimes MD   hydrOXYzine HCL 25 mg Oral Q6H PRN Augustina Cuba, CRNP   levalbuterol 1 25 mg Nebulization Q8H PRN Eligio Vail MD   Or       sodium chloride 3 mL Nebulization Q8H PRN Eligio Vail MD   levothyroxine 125 mcg Oral Daily Vicky Donnie, CRNP   lithium carbonate 300 mg Oral Daily Yarely Ditto, CRNP   lithium carbonate 450 mg Oral HS Yarely Ditheresao, CRNP   LORazepam 1 mg Intramuscular Q4H PRN Augustina Cuba, CRNP   LORazepam 0 5 mg Oral Q4H PRN Augustina Cuba, CRNP   magnesium hydroxide 30 mL Oral Daily PRN Augustina Arun, CRNP   nicotine 14 mg Transdermal Daily Britt Sos, CRNP   nicotine polacrilex 2 mg Oral Q2H PRN Augustina Cuba, CRNP   pantoprazole 20 mg Oral Daily Britt Sos, CRNP   [START ON 3/6/2019] predniSONE 2 5 mg Oral Daily Wanda Barba MD   QUEtiapine 450 mg Oral HS Yarely Ditto, CRNP   QUEtiapine 50 mg Oral Daily Octavia Pretty MD   theophylline 200 mg Oral Daily Vicky Ciminifaisal, CRNP   traZODone 25 mg Oral HS PRN Augustina [place], CRNP       Risks, benefits and possible side effects of Medications:   Patient does not verbalize understanding at this time and will require further explanation

## 2019-03-06 PROCEDURE — 99232 SBSQ HOSP IP/OBS MODERATE 35: CPT | Performed by: NURSE PRACTITIONER

## 2019-03-06 RX ORDER — CLONAZEPAM 0.5 MG/1
0.5 TABLET ORAL 2 TIMES DAILY PRN
Status: DISCONTINUED | OUTPATIENT
Start: 2019-03-06 | End: 2019-03-08

## 2019-03-06 RX ADMIN — ALBUTEROL SULFATE 2 PUFF: 90 AEROSOL, METERED RESPIRATORY (INHALATION) at 13:38

## 2019-03-06 RX ADMIN — ALBUTEROL SULFATE 2 PUFF: 90 AEROSOL, METERED RESPIRATORY (INHALATION) at 19:17

## 2019-03-06 RX ADMIN — ALBUTEROL SULFATE 2 PUFF: 90 AEROSOL, METERED RESPIRATORY (INHALATION) at 08:34

## 2019-03-06 RX ADMIN — PREDNISONE 2.5 MG: 2.5 TABLET ORAL at 08:35

## 2019-03-06 RX ADMIN — QUETIAPINE 50 MG: 50 TABLET ORAL at 08:36

## 2019-03-06 RX ADMIN — THEOPHYLLINE ANHYDROUS 200 MG: 200 CAPSULE, EXTENDED RELEASE ORAL at 08:36

## 2019-03-06 RX ADMIN — LITHIUM CARBONATE EXTENDED-RELEASE TABLET 300 MG: 300 TABLET, FILM COATED, EXTENDED RELEASE ORAL at 08:36

## 2019-03-06 RX ADMIN — PANTOPRAZOLE SODIUM 20 MG: 20 TABLET, DELAYED RELEASE ORAL at 06:10

## 2019-03-06 RX ADMIN — CLONAZEPAM 0.5 MG: 0.5 TABLET ORAL at 21:35

## 2019-03-06 RX ADMIN — NICOTINE 14 MG: 14 PATCH, EXTENDED RELEASE TRANSDERMAL at 08:40

## 2019-03-06 RX ADMIN — LITHIUM CARBONATE 450 MG: 450 TABLET, EXTENDED RELEASE ORAL at 22:13

## 2019-03-06 RX ADMIN — LEVOTHYROXINE SODIUM 125 MCG: 125 TABLET ORAL at 06:10

## 2019-03-06 RX ADMIN — QUETIAPINE FUMARATE 450 MG: 50 TABLET, FILM COATED ORAL at 22:13

## 2019-03-06 RX ADMIN — CLONAZEPAM 0.5 MG: 0.5 TABLET ORAL at 10:54

## 2019-03-06 RX ADMIN — FLUTICASONE FUROATE AND VILANTEROL TRIFENATATE 1 PUFF: 200; 25 POWDER RESPIRATORY (INHALATION) at 08:38

## 2019-03-06 NOTE — PROGRESS NOTES
Patient stated she is having "free floating anxiety"  She is currently having anxiety due to needing her inhaler and she said her anxiety started last night when staff member "Holden Read" was working  She says, "I know she does not like me and she seems to be nosy about me", "I know I should not care so much because people sometimes do not like people " She says she is moderately depressed  She denies SI/ HI/ hallucinations  She is present in the milieu for breakfast  1017: Patient was inquiring about klonopin as a prn  She said she spoke with [facility] student PA today  Informed [facility] BARBARA CALIXTO and Maite Varghese NP  1054: prn klonopin given for anxiety  1340 Patient stated klonopin effective  She was talking about "going to gift shop to buy a few items  "

## 2019-03-06 NOTE — PROGRESS NOTES
Progress Note - Behavioral Health   Roselia Woody 66 y o  female MRN: [id number]  Unit/Bed#: Dinora Patel 117-41 Encounter: [id number]    The patient was seen for continuing care and reviewed with treatment team   Staff reports the patient an anxiety attack last evening because she thought another patient was poisoning her  She has been cooperative in groups but needs redirection  The patient reports that she felt very panicky last evening  She said that she spent a lot of time talking to her peers and all of their problems started overwhelming her  She said she went to her room and had to hold onto the mattress and had trouble swallowing and breathing  She wanted a p r n  but did not like what was available to her because she thinks she is allergic to Ativan and Atarax  Says that she has taken Klonopin in the past and that has worked for her  Remains anxious about her inhalers and oxygen tanks as well as the tour coming up on Friday  Was upset that a nurse asked her to brush her teeth because she is worried she may be allergic to the toothpaste  Mental Status Evaluation:  Appearance:  Good eye contact and disheveled   Behavior:  calm and friendly   Mood:  anxious   Affect: appropriate and broad   Speech: rambling   Thought Process:   Tangential   Thought Content:  Paranoid and mistrustful and Delusions of persecution   Perceptual Disturbances: Denies hallucinations and does not appear to be responding to internal stimuli   Risk Potential: No suicidal or homicidal ideation   Attention/Concentration [place] than expected   Orientation:   Oriented x3   Gait/Station: Not observed   Motor Activity: Mild bilateral hand tremor     Progress Toward Goals:  Has some increased anxiety related to discharge    Assessment/Plan    Principal Problem:    [facility]ffective [facility], [facility] ([facility]      Recommended Treatment:     Continue Seroquel 50 mg q a m  and 450 mg HS        Continue lithium 300 mg q a m  and 450 mg HS  Continue with pharmacotherapy, group therapy, milieu therapy and occupational therapy  The patient will be maintained on the following medications:    Current Facility-Administered Medications:  acetaminophen 650 mg Oral Q6H PRN Andrew Gerardo, CRNP   acetaminophen 650 mg Oral Q4H PRN Andrew Gerardo, CRNP   acetaminophen 975 mg Oral Q6H PRN Andrew Gerardo, CRNP   albuterol 2 puff Inhalation TID Isidro Ribeiro MD   benztropine 1 mg Intramuscular BID PRN Andrew Gerardo, CRNP   benztropine 1 mg Oral BID PRN Andrew Gerardo, CRNP   calcium carbonate 500 mg Oral Daily PRN Jd Urbina MD   clonazePAM 0 5 mg Oral BID PRN Jorden Mahmood, JOSE CARLOSNP   EPINEPHrine PF 0 15 mg Intramuscular PRN Marguerite Seeds, CRNP   fluticasone-vilanterol 1 puff Inhalation Daily Lopez Rosas MD   hydrOXYzine HCL 25 mg Oral Q6H PRN Andrew Gerardo, CRNP   levalbuterol 1 25 mg Nebulization Q8H PRN Isidro Ribeiro MD   Or       sodium chloride 3 mL Nebulization Q8H PRN Isidro Ribeiro MD   levothyroxine 125 mcg Oral Daily Marguerite Seeds, CRNP   lithium carbonate 300 mg Oral Daily [facility] Brandon, CRNP   lithium carbonate 450 mg Oral HS [facility], CRNP   LORazepam 1 mg Intramuscular Q4H PRN Andrew Gerardo, CRNP   magnesium hydroxide 30 mL Oral Daily PRN Andrew Gerardo, JOSE CARLOSNP   nicotine 14 mg Transdermal Daily Marguerite Seeds, CRNP   nicotine polacrilex 2 mg Oral Q2H PRN Andrew Gerardo, ABILIO   pantoprazole 20 mg Oral Daily Vicky Ciminieri, CRNP   predniSONE 2 5 mg Oral Daily Jd Urbina MD   QUEtiapine 450 mg Oral HS [place] Brandon, CRNP   QUEtiapine 50 mg Oral Daily Emily Allison MD   theophylline 200 mg Oral Daily Marguerite Seeds, CRNP   traZODone 25 mg Oral HS PRN ABILIO Crain       Risks, benefits and possible side effects of Medications:   Patient does not verbalize understanding at this time and will require further explanation

## 2019-03-06 NOTE — PROGRESS NOTES
Patient visible on unit  She remains intermittently anxious and somewhat paranoid about different things; catastrophizes outcomes  Worries frequently about her oxygen  Wants tank replaced before it is in the RED zone  Worried that her rescue inhaler replacement will not be delivered in time ("I calculated I only have 2 puffs left")  It did arrive in time  She is social with select few peers and staff; brightens on approach    She denies S/H I

## 2019-03-07 PROCEDURE — 99232 SBSQ HOSP IP/OBS MODERATE 35: CPT | Performed by: NURSE PRACTITIONER

## 2019-03-07 RX ADMIN — FLUTICASONE FUROATE AND VILANTEROL TRIFENATATE 1 PUFF: 200; 25 POWDER RESPIRATORY (INHALATION) at 08:33

## 2019-03-07 RX ADMIN — PANTOPRAZOLE SODIUM 20 MG: 20 TABLET, DELAYED RELEASE ORAL at 06:59

## 2019-03-07 RX ADMIN — LITHIUM CARBONATE 450 MG: 450 TABLET, EXTENDED RELEASE ORAL at 21:49

## 2019-03-07 RX ADMIN — NICOTINE 14 MG: 14 PATCH, EXTENDED RELEASE TRANSDERMAL at 08:35

## 2019-03-07 RX ADMIN — PREDNISONE 2.5 MG: 2.5 TABLET ORAL at 08:33

## 2019-03-07 RX ADMIN — THEOPHYLLINE ANHYDROUS 200 MG: 200 CAPSULE, EXTENDED RELEASE ORAL at 08:33

## 2019-03-07 RX ADMIN — ALBUTEROL SULFATE 2 PUFF: 90 AEROSOL, METERED RESPIRATORY (INHALATION) at 15:06

## 2019-03-07 RX ADMIN — QUETIAPINE FUMARATE 450 MG: 50 TABLET, FILM COATED ORAL at 21:49

## 2019-03-07 RX ADMIN — CLONAZEPAM 0.5 MG: 0.5 TABLET ORAL at 21:18

## 2019-03-07 RX ADMIN — QUETIAPINE 50 MG: 50 TABLET ORAL at 08:34

## 2019-03-07 RX ADMIN — CLONAZEPAM 0.5 MG: 0.5 TABLET ORAL at 11:13

## 2019-03-07 RX ADMIN — ALBUTEROL SULFATE 2 PUFF: 90 AEROSOL, METERED RESPIRATORY (INHALATION) at 21:50

## 2019-03-07 RX ADMIN — LITHIUM CARBONATE EXTENDED-RELEASE TABLET 300 MG: 300 TABLET, FILM COATED, EXTENDED RELEASE ORAL at 08:33

## 2019-03-07 RX ADMIN — ALBUTEROL SULFATE 2 PUFF: 90 AEROSOL, METERED RESPIRATORY (INHALATION) at 08:33

## 2019-03-07 RX ADMIN — LEVOTHYROXINE SODIUM 125 MCG: 125 TABLET ORAL at 06:59

## 2019-03-07 NOTE — PROGRESS NOTES
Progress Note - Behavioral Health   Sen Garvey 66 y o  female MRN: [id number]  Unit/Bed#: Pratima Cruz 796-49 Encounter: [id number]    The patient was seen for continuing care and reviewed with treatment team   Staff reports the patient has had increased anxiety related to her potential discharge location  She has been visible and social  She attends groups but tends to try to monopolize them and needs redirection  She will have a tour tomorrow of her potential facility  The patient is bright and friendly on approach  She states she has a whole lot of things that she needs to talk about related to her tour tomorrow but is worried someone with overhear and needs to talk directly to the doctor about these problems  She has been "pondering all of the potential problems" and remains fixated on thinking she is going to run out of oxygen during the tour  Continues to have auditory hallucinations of tapes playing with various family members voices who are conspiring against her  No SI  Sleep and appetite have been okay  P r n  Clonazepam has been effective for her anxiety  Mental Status Evaluation:  Appearance:  Good eye contact and disheveled   Behavior:  calm and friendly   Mood:  anxious   Affect: appropriate and broad   Speech: rambling   Thought Process: Tangential   Thought Content:  Paranoid and mistrustful and Delusions of persecution   Perceptual Disturbances:  Auditory hallucinations without commands   Risk Potential: No suicidal or homicidal ideation   Attention/Concentration [place] than expected   Orientation:   Oriented x3   Gait/Station: Not observed   Motor Activity: Mild bilateral hand tremor     Progress Toward Goals:  Continues with increased anxiety related to discharge    Assessment/Plan    Principal Problem:    Schizoaffective disorder, bipolar type (HCC)      Recommended Treatment:    Continue Seroquel 50 mg q a m  and 450 mg HS        Continue lithium 300 mg q a m  and 450 mg HS       Continue with pharmacotherapy, group therapy, milieu therapy and occupational therapy  The patient will be maintained on the following medications:    Current Facility-Administered Medications:  acetaminophen 650 mg Oral Q6H PRN Duwaine Maffucci, CRNP   acetaminophen 650 mg Oral Q4H PRN Duwaine Maffucci, CRNP   acetaminophen 975 mg Oral Q6H PRN Duwaine Maffucci, CRNP   albuterol 2 puff Inhalation TID Jerome Rey MD   benztropine 1 mg Intramuscular BID PRN Duwaine Maffucci, CRNP   benztropine 1 mg Oral BID PRN Duwaine Maffucci, CRNP   calcium carbonate 500 mg Oral Daily PRN Anna Arana MD   clonazePAM 0 5 mg Oral BID PRN Grant Dura, CRNP   EPINEPHrine PF 0 15 mg Intramuscular PRN Donelda Josué, CRNP   fluticasone-vilanterol 1 puff Inhalation Daily Pedro Hernandez MD   hydrOXYzine HCL 25 mg Oral Q6H PRN Duwaine Maffucci, CRNP   levalbuterol 1 25 mg Nebulization Q8H PRN Jerome Rey MD   Or       sodium chloride 3 mL Nebulization Q8H PRN Jerome Rey MD   levothyroxine 125 mcg Oral Daily Donelda Josué, CRNP   lithium carbonate 300 mg Oral Daily Grant Dura, CRNP   lithium carbonate 450 mg Oral HS Grant Dura, CRNP   LORazepam 1 mg Intramuscular Q4H PRN Duwaine Maffucci, CRNP   magnesium hydroxide 30 mL Oral Daily PRN Duwaine Maffucci, CRNP   nicotine 14 mg Transdermal Daily Donelda Josué, CRNP   nicotine polacrilex 2 mg Oral Q2H PRN Duwaine Maffucci, CRNP   pantoprazole 20 mg Oral Daily Vicky Ciminifaisal, CRNP   predniSONE 2 5 mg Oral Daily Anna Arana MD   QUEtiapine 450 mg Oral HS Grant Dura, CRNP   QUEtiapine 50 mg Oral Daily Boo Rodriguez MD   theophylline 200 mg Oral Daily Donelda Josué, CRNP   traZODone 25 mg Oral HS PRN Duwaine Maffucci, CRNP       Risks, benefits and possible side effects of Medications:   Patient does not verbalize understanding at this time and will require further explanation

## 2019-03-07 NOTE — CASE MANAGEMENT
Spoke to Lilly Santiago ([phone]) at [address] regarding pt's tour tomorrow  Confirmed that  will be at 10-10:15am and pt will be accompanied by Tracie gonzalez and 2 MHTS (one from [facility] and one from [place])

## 2019-03-07 NOTE — PROGRESS NOTES
Pt is cooperative with care and is medication compliant  Pt is present in the milieu, social with staff and peers  Pt advised wanting to speak to the physician about her tour coming up  Pt requested PRN klonopin @ 11:20AM for increased anxiety  Given  Will continue to monitor

## 2019-03-07 NOTE — PROGRESS NOTES
Pt calm, cooperative with care, and pleasant  Pt med compliant  Pt denies all symptoms, stating, "I have ups and downs but my new medication is helping "  Pt attended group and was noted to eat dinner in the dayroom  Pt visible in milieu and social with peers  PRN clonazepam provided at 2135 per request for mild anxiety with effectiveness

## 2019-03-07 NOTE — PLAN OF CARE
Problem: Alteration in Thoughts and Perception  Goal: Treatment Goal: Gain control of psychotic behaviors/thinking, reduce/eliminate presenting symptoms and demonstrate improved reality functioning upon discharge  Outcome: Progressing     Problem: Depression  Goal: Treatment Goal: Demonstrate behavioral control of depressive symptoms, verbalize feelings of improved mood/affect, and adopt new coping skills prior to discharge  Outcome: Progressing  Goal: Verbalize thoughts and feelings  Description  Interventions:  - Assess and re-assess patient's level of risk   - Engage patient in 1:1 interactions, daily, for a minimum of 15 minutes   - Encourage patient to express feelings, fears, frustrations, hopes    Outcome: Progressing  Goal: Refrain from harming self  Description  Interventions:  - Monitor patient closely, per order   - Supervise medication ingestion, monitor effects and side effects    Outcome: Progressing  Goal: Refrain from isolation  Description  Interventions:  - Develop a trusting relationship   - Encourage socialization    Outcome: Progressing  Goal: Refrain from self-neglect  Outcome: Progressing  Goal: Attend and participate in unit activities, including therapeutic, recreational, and educational groups  Description  Interventions:  - Provide therapeutic and educational activities daily, encourage attendance and participation, and document same in the medical record    Outcome: Progressing  Goal: Complete daily ADLs, including personal hygiene independently, as able  Description  Interventions:  - Observe, teach, and assist patient with ADLS  -  Monitor and promote a balance of rest/activity, with adequate nutrition and elimination    Outcome: Progressing     Problem: Anxiety  Goal: Anxiety is at manageable level  Description  Interventions:  - Assess and monitor patient's anxiety level     - Monitor for signs and symptoms of anxiety both physical and emotional (heart palpitations, chest pain, shortness of breath, headaches, nausea, feeling jumpy, restlessness, irritable, apprehensive)  - Collaborate with interdisciplinary team and initiate plan and interventions as ordered    - [place] patient to unit/surroundings  - Explain treatment plan  - Encourage participation in care  - Encourage verbalization of concerns/fears  - Identify coping mechanisms  - Assist in developing anxiety-reducing skills  - Administer/offer alternative therapies  - Limit or eliminate stimulants   Outcome: Progressing

## 2019-03-08 PROCEDURE — 99232 SBSQ HOSP IP/OBS MODERATE 35: CPT | Performed by: NURSE PRACTITIONER

## 2019-03-08 RX ORDER — GABAPENTIN 100 MG/1
100 CAPSULE ORAL 3 TIMES DAILY PRN
Status: DISCONTINUED | OUTPATIENT
Start: 2019-03-08 | End: 2019-03-20 | Stop reason: HOSPADM

## 2019-03-08 RX ADMIN — ALBUTEROL SULFATE 2 PUFF: 90 AEROSOL, METERED RESPIRATORY (INHALATION) at 13:27

## 2019-03-08 RX ADMIN — LEVOTHYROXINE SODIUM 125 MCG: 125 TABLET ORAL at 06:35

## 2019-03-08 RX ADMIN — CLONAZEPAM 0.5 MG: 0.5 TABLET ORAL at 09:44

## 2019-03-08 RX ADMIN — LITHIUM CARBONATE EXTENDED-RELEASE TABLET 300 MG: 300 TABLET, FILM COATED, EXTENDED RELEASE ORAL at 08:57

## 2019-03-08 RX ADMIN — LITHIUM CARBONATE 450 MG: 450 TABLET, EXTENDED RELEASE ORAL at 21:14

## 2019-03-08 RX ADMIN — ALBUTEROL SULFATE 2 PUFF: 90 AEROSOL, METERED RESPIRATORY (INHALATION) at 21:18

## 2019-03-08 RX ADMIN — FLUTICASONE FUROATE AND VILANTEROL TRIFENATATE 1 PUFF: 200; 25 POWDER RESPIRATORY (INHALATION) at 08:51

## 2019-03-08 RX ADMIN — THEOPHYLLINE ANHYDROUS 200 MG: 200 CAPSULE, EXTENDED RELEASE ORAL at 08:50

## 2019-03-08 RX ADMIN — ALBUTEROL SULFATE 2 PUFF: 90 AEROSOL, METERED RESPIRATORY (INHALATION) at 08:49

## 2019-03-08 RX ADMIN — QUETIAPINE 50 MG: 50 TABLET ORAL at 08:50

## 2019-03-08 RX ADMIN — PREDNISONE 2.5 MG: 2.5 TABLET ORAL at 08:50

## 2019-03-08 RX ADMIN — PANTOPRAZOLE SODIUM 20 MG: 20 TABLET, DELAYED RELEASE ORAL at 06:33

## 2019-03-08 RX ADMIN — QUETIAPINE FUMARATE 450 MG: 50 TABLET, FILM COATED ORAL at 21:14

## 2019-03-08 NOTE — NURSING NOTE
Patient has been visible in unit, anxious at times needing frequent encouragement and reassurance  Preoccupied about trip to [facility] today, time management, oxygen tank and taking PRN albuterol this a m, given klonopin 0 5 mg po prn at 0944 for 4/4 anxiety which has effective  Accompanied off unit from 1030 to 1130 with [facility], pleasant and expressed "it went well" upon return to unit  Patient on continuous 3L O2 via [place]  Has been compliant with meals and medications, refused nicotine patch today  Labs, orders and vital signs have been reviewed  On routine checks, will continue to monitor

## 2019-03-08 NOTE — PROGRESS NOTES
Pt calm, cooperative and pleasant  Pt visible in milieu, social with peers and noted to be in dayroom for meal   Pt appears anxious but denies all other symptoms  Pt able to vocalize feelings and needs  Will continue to monitor

## 2019-03-08 NOTE — PROGRESS NOTES
Pt calm and cooperative  Pt seen in the milieu for part of the evening associating with peers  Pt reports anxiety due to pending tour in the morning  Pt denies remaining s/s  Pt has no complaints and is medication compliant

## 2019-03-08 NOTE — PROGRESS NOTES
Progress Note - Behavioral Health   [facility] 66 y o  female MRN: [id number]  Unit/Bed#: Narinder Pérez 650-33 Encounter: [id number]    The patient was seen for continuing care and reviewed with treatment team   Staff reports the patient has had increased anxiety related to the tour she is going on today  She has been taking p r n  Clonazepam every time it is available despite appearing visibly calm  She did shower last night with staff assistance  The patient is bright and friendly on approach  She states she is feeling anxious about going off site today  She remains fixated on the oxygen and thinks she is going to run out during the tour  Explained that she will be given a full oxygen tank prior to the trip and that she will be going with several staff members  She says that staff have not been nice to her and they are jealous of her  Multiple complaints this morning  Appetite and sleep have been okay  No hallucinations currently  No SI  Mental Status Evaluation:  Appearance:  Adequate hygiene and grooming and Good eye contact   Behavior:  calm, cooperative and friendly   Mood:  anxious   Affect: appropriate and broad   Speech: Normal rate and Normal volume   Thought Process: Tangential   Thought Content:  Paranoid and mistrustful and Delusions of persecution   Perceptual Disturbances: Denies hallucinations and does not appear to be responding to internal stimuli   Risk Potential: No suicidal or homicidal ideation   Attention/Concentration [place] than expected   Orientation:   Oriented x3   Gait/Station: Not observed   Motor Activity: Mild bilateral hand tremor     Progress Toward Goals:  Baseline with some anxiety related to upcoming discharge    Assessment/Plan    Principal Problem:    Schizoaffective disorder, bipolar type (HCC)      Recommended Treatment:     Continue Seroquel 50 mg q a m  and 450 mg HS        Continue lithium 300 mg q a m  and 450 mg HS      Add gabapentin as a p r n  to reduce dependence on benzodiazepines  Continue with pharmacotherapy, group therapy, milieu therapy and occupational therapy  The patient will be maintained on the following medications:    Current Facility-Administered Medications:  acetaminophen 650 mg Oral Q6H PRN Petra Acron, CRNP   acetaminophen 650 mg Oral Q4H PRN Petra Acron, CRNP   acetaminophen 975 mg Oral Q6H PRN Petra Acron, CRNP   albuterol 2 puff Inhalation TID Inderjit Velez MD   benztropine 1 mg Intramuscular BID PRN Petra Acron, CRNP   benztropine 1 mg Oral BID PRN Petra Acron, CRNP   calcium carbonate 500 mg Oral Daily PRN Amanda Bryan MD   clonazePAM 0 5 mg Oral BID PRN Shannan Vaca, CRNP   EPINEPHrine PF 0 15 mg Intramuscular PRN Jenifer Burns, CRNP   fluticasone-vilanterol 1 puff Inhalation Daily Michael Taylor MD   hydrOXYzine HCL 25 mg Oral Q6H PRN Petra [place], CRNP   levalbuterol 1 25 mg Nebulization Q8H PRN Inderjit Velez MD   Or       sodium chloride 3 mL Nebulization Q8H PRN Inderjit Velez MD   levothyroxine 125 mcg Oral Daily Jenifer Burns, CRNP   lithium carbonate 300 mg Oral Daily Shannan Vaca, CRNP   lithium carbonate 450 mg Oral HS Shannan Vaca, CRGAYLA   LORazepam 1 mg Intramuscular Q4H PRN Petra [place], CRNP   magnesium hydroxide 30 mL Oral Daily PRN Petra Acron, CRNP   nicotine 14 mg Transdermal Daily Jenifer Burns, CRNP   nicotine polacrilex 2 mg Oral Q2H PRN Petra Cooley, CRNP   pantoprazole 20 mg Oral Daily Vicky Fields, CRNP   predniSONE 2 5 mg Oral Daily Amanda Bryan MD   QUEtiapine 450 mg Oral HS Shannan Vaca, CRNP   QUEtiapine 50 mg Oral Daily Meche Gilliam MD   theophylline 200 mg Oral Daily Jenifertima Burns, CRNP   traZODone 25 mg Oral HS PRN Petra Cooley, CRNP       Risks, benefits and possible side effects of Medications:   Patient does not verbalize understanding at this time and will require further explanation

## 2019-03-08 NOTE — PLAN OF CARE
Pt  out of building this morning to tour possible placement  She returned with bright affect and expressed hope for this facility to work out for future housing

## 2019-03-08 NOTE — PLAN OF CARE
Problem: Alteration in Thoughts and Perception  Goal: Treatment Goal: Gain control of psychotic behaviors/thinking, reduce/eliminate presenting symptoms and demonstrate improved reality functioning upon discharge  Outcome: Progressing     Problem: Depression  Goal: Treatment Goal: Demonstrate behavioral control of depressive symptoms, verbalize feelings of improved mood/affect, and adopt new coping skills prior to discharge  Outcome: Progressing  Goal: Verbalize thoughts and feelings  Description  Interventions:  - Assess and re-assess patient's level of risk   - Engage patient in 1:1 interactions, daily, for a minimum of 15 minutes   - Encourage patient to express feelings, fears, frustrations, hopes    Outcome: Progressing  Goal: Refrain from harming self  Description  Interventions:  - Monitor patient closely, per order   - Supervise medication ingestion, monitor effects and side effects    Outcome: Progressing  Goal: Refrain from isolation  Description  Interventions:  - Develop a trusting relationship   - Encourage socialization    Outcome: Progressing  Goal: Refrain from self-neglect  Outcome: Progressing  Goal: Attend and participate in unit activities, including therapeutic, recreational, and educational groups  Description  Interventions:  - Provide therapeutic and educational activities daily, encourage attendance and participation, and document same in the medical record    Outcome: Progressing  Goal: Complete daily ADLs, including personal hygiene independently, as able  Description  Interventions:  - Observe, teach, and assist patient with ADLS  -  Monitor and promote a balance of rest/activity, with adequate nutrition and elimination    Outcome: Progressing     Problem: Anxiety  Goal: Anxiety is at manageable level  Description  Interventions:  - Assess and monitor patient's anxiety level     - Monitor for signs and symptoms of anxiety both physical and emotional (heart palpitations, chest pain, shortness of breath, headaches, nausea, feeling jumpy, restlessness, irritable, apprehensive)  - Collaborate with interdisciplinary team and initiate plan and interventions as ordered    - [place] patient to unit/surroundings  - Explain treatment plan  - Encourage participation in care  - Encourage verbalization of concerns/fears  - Identify coping mechanisms  - Assist in developing anxiety-reducing skills  - Administer/offer alternative therapies  - Limit or eliminate stimulants   Outcome: Progressing

## 2019-03-08 NOTE — CASE MANAGEMENT
Spoke with pt briefly regarding tour at [facility]  Pt had all positive things to say -- she was pleased with the facility, the room she'd have, and the staff  She stated that she was "mulling" everything over before CM came in and she's hoping she'll be out of here soon  CM will give pt update as soon as [facility] staff gives update  Pt also requested that CM reach out to her step sister, Donna Clark, to request that she bring pt's belongings to the hospital so she has her checkbook, purse, etc  prior to her move into [facility]  CM called Deb Waiten [phone] to get update on how tour went  She stated that it went really well and there were no issues  CM asked what the next step is as pt has been ready for d/c for over a month  She stated that they typically due a trial run overnight stay  CM explained again that this would not be possible or feasible since pt is on an inpatient psych unit, not an EAC unit  CM requested that she speak with El Rodriguez and her supervisor about making an exception with this case due to the circumstances  Aristidesana Green stated that she would follow up with them and give CM an update on Monday  She also stated that if pt is excused from trial run stay, pt still wouldn't be able to move in for about a week since her room isn't ready yet and pt's oxygen supply would need to be coordinated/set up prior to her admission

## 2019-03-09 PROCEDURE — 99232 SBSQ HOSP IP/OBS MODERATE 35: CPT | Performed by: PSYCHIATRY & NEUROLOGY

## 2019-03-09 RX ADMIN — QUETIAPINE FUMARATE 450 MG: 50 TABLET, FILM COATED ORAL at 22:42

## 2019-03-09 RX ADMIN — ALBUTEROL SULFATE 2 PUFF: 90 AEROSOL, METERED RESPIRATORY (INHALATION) at 20:00

## 2019-03-09 RX ADMIN — PREDNISONE 2.5 MG: 2.5 TABLET ORAL at 08:51

## 2019-03-09 RX ADMIN — ALBUTEROL SULFATE 2 PUFF: 90 AEROSOL, METERED RESPIRATORY (INHALATION) at 14:01

## 2019-03-09 RX ADMIN — ALBUTEROL SULFATE 2 PUFF: 90 AEROSOL, METERED RESPIRATORY (INHALATION) at 08:49

## 2019-03-09 RX ADMIN — LITHIUM CARBONATE EXTENDED-RELEASE TABLET 300 MG: 300 TABLET, FILM COATED, EXTENDED RELEASE ORAL at 08:51

## 2019-03-09 RX ADMIN — QUETIAPINE 50 MG: 50 TABLET ORAL at 08:51

## 2019-03-09 RX ADMIN — FLUTICASONE FUROATE AND VILANTEROL TRIFENATATE 1 PUFF: 200; 25 POWDER RESPIRATORY (INHALATION) at 08:49

## 2019-03-09 RX ADMIN — THEOPHYLLINE ANHYDROUS 200 MG: 200 CAPSULE, EXTENDED RELEASE ORAL at 08:51

## 2019-03-09 RX ADMIN — LITHIUM CARBONATE 450 MG: 450 TABLET, EXTENDED RELEASE ORAL at 22:44

## 2019-03-09 RX ADMIN — NICOTINE 14 MG: 14 PATCH, EXTENDED RELEASE TRANSDERMAL at 08:50

## 2019-03-09 RX ADMIN — LEVOTHYROXINE SODIUM 125 MCG: 125 TABLET ORAL at 06:22

## 2019-03-09 RX ADMIN — PANTOPRAZOLE SODIUM 20 MG: 20 TABLET, DELAYED RELEASE ORAL at 06:22

## 2019-03-09 NOTE — PROGRESS NOTES
C/O"Last night this one was not good to me "    Report from staff regarding this patient received and record reviewed  prior to seeing this patient   Behavior over the last 24 hours:  Patient is seen as part of on call making rounds on the unit, patient reports that last night she slept okay she woke up in the morning and her O2 cannula was out and she got worried, and she os the nurse crying to her to see that what was told to reading and the nurse told her just do not worry and patient feels little bit upset, did a supportive a counseling, she feels okay in a, she has been taking her medication denied side effect  Patient told me that she went to visit the group home she liked the group home and she liked the staff over there as well as the bed and the room that patients have, expressing desires to possibly go to this group home I understand the group home name is a [place]  Sleep:  Good  Appetite:  Okay  Medication side effects:  Denied  ROS:  Improved  Mental Status Evaluation:  Appearance:  Dressed appropraitely   Behavior:  cooperative   Speech:  normal   Mood:  euthymic   Affect:  appropriate     Thought Process:  Goal directed   Thought Content:  normal   Perceptual Disturbances: Denied AV hallucination   Risk Potential: NO GIBSON    Sensorium:  normal   Cognition:  intact   Consciousness:  Alert, OX3   Attention: Fair   Insight:  fair   Judgment: fair   Gait/Station: With in normal range   Motor Activity: With in normal range     Progress Toward Goals: working on current treatment goals, no changes  Made in treatment plan   Recommended Treatment: Continue with group therapy, milieu therapy and occupational therapy  Risks, benefits and possible side effects of Medications:   Risks, benefits, and possible side effects of medications explained to patient and patient verbalizes understanding        Medications:   current meds:   Current Facility-Administered Medications   Medication Dose Route Frequency  acetaminophen (TYLENOL) tablet 650 mg  650 mg Oral Q6H PRN    acetaminophen (TYLENOL) tablet 650 mg  650 mg Oral Q4H PRN    acetaminophen (TYLENOL) tablet 975 mg  975 mg Oral Q6H PRN    albuterol (PROVENTIL HFA,VENTOLIN HFA) inhaler 2 puff  2 puff Inhalation TID    benztropine (COGENTIN) injection 1 mg  1 mg Intramuscular BID PRN    benztropine (COGENTIN) tablet 1 mg  1 mg Oral BID PRN    calcium carbonate (TUMS) chewable tablet 500 mg  500 mg Oral Daily PRN    EPINEPHrine PF (ADRENALIN) 1 mg/mL injection 0 15 mg  0 15 mg Intramuscular PRN    fluticasone-vilanterol (BREO ELLIPTA) 200-25 MCG/INH inhaler 1 puff  1 puff Inhalation Daily    gabapentin (NEURONTIN) capsule 100 mg  100 mg Oral TID PRN    hydrOXYzine HCL (ATARAX) tablet 25 mg  25 mg Oral Q6H PRN    levalbuterol (XOPENEX) inhalation solution 1 25 mg  1 25 mg Nebulization Q8H PRN    Or    sodium chloride 0 9 % inhalation solution 3 mL  3 mL Nebulization Q8H PRN    levothyroxine tablet 125 mcg  125 mcg Oral Daily    lithium carbonate (LITHOBID) CR tablet 300 mg  300 mg Oral Daily    lithium carbonate (LITHOBID) CR tablet 450 mg  450 mg Oral HS    LORazepam (ATIVAN) 2 mg/mL injection 1 mg  1 mg Intramuscular Q4H PRN    magnesium hydroxide (MILK OF MAGNESIA) 400 mg/5 mL oral suspension 30 mL  30 mL Oral Daily PRN    nicotine (NICODERM CQ) 14 mg/24hr TD 24 hr patch 14 mg  14 mg Transdermal Daily    nicotine polacrilex (NICORETTE) gum 2 mg  2 mg Oral Q2H PRN    pantoprazole (PROTONIX) EC tablet 20 mg  20 mg Oral Daily    predniSONE tablet 2 5 mg  2 5 mg Oral Daily    QUEtiapine (SEROquel) tablet 450 mg  450 mg Oral HS    QUEtiapine (SEROquel) tablet 50 mg  50 mg Oral Daily    theophylline (JEF-24) 24 hr capsule 200 mg  200 mg Oral Daily    traZODone (DESYREL) tablet 25 mg  25 mg Oral HS PRN         Labs: NA    Assessment, Diagnosis  and Plan: continue with current meds and goals, F/U tomorrow    Counseling / Coordination of Care  Total floor / unit time spent today30 minutes  minutes  Greater than 50% of total time was spent with the patient and / or family counseling and / or coordination of care  A description of the counseling / coordination of care:      Sidney Rodgers MD

## 2019-03-09 NOTE — PLAN OF CARE
Problem: Alteration in Thoughts and Perception  Goal: Treatment Goal: Gain control of psychotic behaviors/thinking, reduce/eliminate presenting symptoms and demonstrate improved reality functioning upon discharge  Outcome: Progressing     Problem: Depression  Goal: Treatment Goal: Demonstrate behavioral control of depressive symptoms, verbalize feelings of improved mood/affect, and adopt new coping skills prior to discharge  Outcome: Progressing  Goal: Verbalize thoughts and feelings  Description  Interventions:  - Assess and re-assess patient's level of risk   - Engage patient in 1:1 interactions, daily, for a minimum of 15 minutes   - Encourage patient to express feelings, fears, frustrations, hopes    Outcome: Progressing  Goal: Refrain from harming self  Description  Interventions:  - Monitor patient closely, per order   - Supervise medication ingestion, monitor effects and side effects    Outcome: Progressing  Goal: Refrain from isolation  Description  Interventions:  - Develop a trusting relationship   - Encourage socialization    Outcome: Progressing  Goal: Refrain from self-neglect  Outcome: Progressing  Goal: Attend and participate in unit activities, including therapeutic, recreational, and educational groups  Description  Interventions:  - Provide therapeutic and educational activities daily, encourage attendance and participation, and document same in the medical record    Outcome: Progressing  Goal: Complete daily ADLs, including personal hygiene independently, as able  Description  Interventions:  - Observe, teach, and assist patient with ADLS  -  Monitor and promote a balance of rest/activity, with adequate nutrition and elimination    Outcome: Progressing     Problem: Anxiety  Goal: Anxiety is at manageable level  Description  Interventions:  - Assess and monitor patient's anxiety level     - Monitor for signs and symptoms of anxiety both physical and emotional (heart palpitations, chest pain, shortness of breath, headaches, nausea, feeling jumpy, restlessness, irritable, apprehensive)  - Collaborate with interdisciplinary team and initiate plan and interventions as ordered    - [place] patient to unit/surroundings  - Explain treatment plan  - Encourage participation in care  - Encourage verbalization of concerns/fears  - Identify coping mechanisms  - Assist in developing anxiety-reducing skills  - Administer/offer alternative therapies  - Limit or eliminate stimulants   Outcome: Progressing     Problem: DISCHARGE PLANNING  Goal: Discharge to home or other facility with appropriate resources  Description  INTERVENTIONS:  - Identify barriers to discharge w/patient and caregiver  - Arrange for needed discharge resources and transportation as appropriate  - Identify discharge learning needs (meds, wound care, etc )  - Arrange for interpretive services to assist at discharge as needed  - Refer to Case Management Department for coordinating discharge planning if the patient needs post-hospital services based on physician/advanced practitioner order or complex needs related to functional status, cognitive ability, or social support system   Outcome: Progressing     Problem: Ineffective Coping  Goal: Participates in unit activities  Description  Interventions:  - Provide therapeutic environment   - Provide required programming   - Redirect inappropriate behaviors    Outcome: Progressing

## 2019-03-09 NOTE — PROGRESS NOTES
C/o anxiety  Requested and received "rescue inhaler" and prn Klonopin  Upset when informed the order had been discontinuied  She was offered PRN Atarax or neurontin  She declined both  States "I have never taken those   ibuprofen don't take a new medication at night"    Educated that she had not been taking Klonopin regularly and it would not normally, therefore be part of discharge meds  Said "yes I worked hard to get myself off that"    Encouraged to utilize other modalities for anxiety (diversion with cards/tv etc)  Educated that as she progresses towards discharge she should develop coping mechanisms to help with change    2000 Sitting in the [place] outside dayroom; next to nurses station with continuous O2 with not additional or unusual (for her) s/s of anxiety    Will continue to monitor

## 2019-03-09 NOTE — PROGRESS NOTES
Pt calm, pleasant and brightens on approach; remains medication compliant and continuous 2 L oxygen  Pt denies SI/HI and reports anxiety at 5/10 r/t pending discharge and states,  "I still have a lot to do before discharge"  She is adamant that her sister not be told that she will be d/c to [place]  Pt states she will call her sister today to bring her checkbook and portable oxygen tank  Pt is still seeking Klonopin - although she doesn't appear anxious at this time  Will continue to monitor

## 2019-03-10 PROCEDURE — 99232 SBSQ HOSP IP/OBS MODERATE 35: CPT | Performed by: PSYCHIATRY & NEUROLOGY

## 2019-03-10 RX ADMIN — ALBUTEROL SULFATE 2 PUFF: 90 AEROSOL, METERED RESPIRATORY (INHALATION) at 14:40

## 2019-03-10 RX ADMIN — LITHIUM CARBONATE 450 MG: 450 TABLET, EXTENDED RELEASE ORAL at 22:09

## 2019-03-10 RX ADMIN — LITHIUM CARBONATE EXTENDED-RELEASE TABLET 300 MG: 300 TABLET, FILM COATED, EXTENDED RELEASE ORAL at 09:45

## 2019-03-10 RX ADMIN — FLUTICASONE FUROATE AND VILANTEROL TRIFENATATE 1 PUFF: 200; 25 POWDER RESPIRATORY (INHALATION) at 09:44

## 2019-03-10 RX ADMIN — ALBUTEROL SULFATE 2 PUFF: 90 AEROSOL, METERED RESPIRATORY (INHALATION) at 09:45

## 2019-03-10 RX ADMIN — ALBUTEROL SULFATE 2 PUFF: 90 AEROSOL, METERED RESPIRATORY (INHALATION) at 21:00

## 2019-03-10 RX ADMIN — QUETIAPINE FUMARATE 450 MG: 50 TABLET, FILM COATED ORAL at 22:09

## 2019-03-10 RX ADMIN — LEVOTHYROXINE SODIUM 125 MCG: 125 TABLET ORAL at 06:16

## 2019-03-10 RX ADMIN — QUETIAPINE 50 MG: 50 TABLET ORAL at 09:45

## 2019-03-10 RX ADMIN — THEOPHYLLINE ANHYDROUS 200 MG: 200 CAPSULE, EXTENDED RELEASE ORAL at 09:45

## 2019-03-10 RX ADMIN — PREDNISONE 2.5 MG: 2.5 TABLET ORAL at 09:45

## 2019-03-10 RX ADMIN — PANTOPRAZOLE SODIUM 20 MG: 20 TABLET, DELAYED RELEASE ORAL at 06:16

## 2019-03-10 RX ADMIN — NICOTINE 14 MG: 14 PATCH, EXTENDED RELEASE TRANSDERMAL at 09:46

## 2019-03-10 NOTE — PROGRESS NOTES
Pt cooperative with care and medication compliant  Visible in dayroom and our for meal  Social upon approach  Denies sx  Will continue to monitor

## 2019-03-10 NOTE — PROGRESS NOTES
Pt was in a bed, ready to sleep, calm and pleasant, denied anxiety, no other complains, had nasal cannula on  3L  Will be monitored  Pt stated she was sleeping well  No complains in the morning

## 2019-03-10 NOTE — PROGRESS NOTES
C/O"under okay and getting ready to go and have breakfast;    Report from staff regarding this patient received and record reviewed  prior to seeing this patient   Behavior over the last 24 hours:  , reports she slept well there is little cold in the room stated that she did not and in groups as there was no growth for the weekend,, taking medication denied  Sleep:  Okay  Appetite:  Fair  Medication side effects:  None  ROS:  No change  Mental Status Evaluation:  Appearance:  Dressed appropraitely   Behavior:  cooperative   Speech:  normal   Mood:  euthymic   Affect:  appropriate  blunted   Thought Process:  Goal directed   Thought Content:  normal   Perceptual Disturbances: Denied AV hallucination   Risk Potential: NO GIBSON    Sensorium:  normal   Cognition:  intact   Consciousness:  Alert, OX3   Attention: Fair   Insight:  fair   Judgment: fair   Gait/Station: Uses walker   Motor Activity: With in normal range     Progress Toward Goals: working on current treatment goals, no changes  Made in treatment plan   Recommended Treatment: Continue with group therapy, milieu therapy and occupational therapy  Risks, benefits and possible side effects of Medications:   Risks, benefits, and possible side effects of medications explained to patient and patient verbalizes understanding        Medications:   current meds:   Current Facility-Administered Medications   Medication Dose Route Frequency    acetaminophen (TYLENOL) tablet 650 mg  650 mg Oral Q6H PRN    acetaminophen (TYLENOL) tablet 650 mg  650 mg Oral Q4H PRN    acetaminophen (TYLENOL) tablet 975 mg  975 mg Oral Q6H PRN    albuterol (PROVENTIL HFA,VENTOLIN HFA) inhaler 2 puff  2 puff Inhalation TID    benztropine (COGENTIN) injection 1 mg  1 mg Intramuscular BID PRN    benztropine (COGENTIN) tablet 1 mg  1 mg Oral BID PRN    calcium carbonate (TUMS) chewable tablet 500 mg  500 mg Oral Daily PRN    EPINEPHrine PF (ADRENALIN) 1 mg/mL injection 0 15 mg 0 15 mg Intramuscular PRN    fluticasone-vilanterol (BREO ELLIPTA) 200-25 MCG/INH inhaler 1 puff  1 puff Inhalation Daily    gabapentin (NEURONTIN) capsule 100 mg  100 mg Oral TID PRN    hydrOXYzine HCL (ATARAX) tablet 25 mg  25 mg Oral Q6H PRN    levalbuterol (XOPENEX) inhalation solution 1 25 mg  1 25 mg Nebulization Q8H PRN    Or    sodium chloride 0 9 % inhalation solution 3 mL  3 mL Nebulization Q8H PRN    levothyroxine tablet 125 mcg  125 mcg Oral Daily    lithium carbonate (LITHOBID) CR tablet 300 mg  300 mg Oral Daily    lithium carbonate (LITHOBID) CR tablet 450 mg  450 mg Oral HS    LORazepam (ATIVAN) 2 mg/mL injection 1 mg  1 mg Intramuscular Q4H PRN    magnesium hydroxide (MILK OF MAGNESIA) 400 mg/5 mL oral suspension 30 mL  30 mL Oral Daily PRN    nicotine (NICODERM CQ) 14 mg/24hr TD 24 hr patch 14 mg  14 mg Transdermal Daily    nicotine polacrilex (NICORETTE) gum 2 mg  2 mg Oral Q2H PRN    pantoprazole (PROTONIX) EC tablet 20 mg  20 mg Oral Daily    predniSONE tablet 2 5 mg  2 5 mg Oral Daily    QUEtiapine (SEROquel) tablet 450 mg  450 mg Oral HS    QUEtiapine (SEROquel) tablet 50 mg  50 mg Oral Daily    theophylline (JEF-24) 24 hr capsule 200 mg  200 mg Oral Daily    traZODone (DESYREL) tablet 25 mg  25 mg Oral HS PRN     Labs: NA    Assessment, Diagnosis  and Plan: continue with current meds and goals, F/U tomorrow    Counseling / Coordination of Care  Total floor / unit time spent today20 minutes  minutes  Greater than 50% of total time was spent with the patient and / or family counseling and / or coordination of care  A description of the counseling / coordination of care:      Mere Hidalgo MD

## 2019-03-10 NOTE — PROGRESS NOTES
Pt is cooperative with care and is medication compliant  Pt remains on 3L of oxygen  Pt is present in the milieu, out for meals  Pt is selectively social with peers  Will monitor

## 2019-03-11 PROCEDURE — 99232 SBSQ HOSP IP/OBS MODERATE 35: CPT | Performed by: NURSE PRACTITIONER

## 2019-03-11 RX ADMIN — ALBUTEROL SULFATE 2 PUFF: 90 AEROSOL, METERED RESPIRATORY (INHALATION) at 21:47

## 2019-03-11 RX ADMIN — NICOTINE 14 MG: 14 PATCH, EXTENDED RELEASE TRANSDERMAL at 08:48

## 2019-03-11 RX ADMIN — QUETIAPINE 50 MG: 50 TABLET ORAL at 08:49

## 2019-03-11 RX ADMIN — LITHIUM CARBONATE 450 MG: 450 TABLET, EXTENDED RELEASE ORAL at 21:43

## 2019-03-11 RX ADMIN — ALBUTEROL SULFATE 2 PUFF: 90 AEROSOL, METERED RESPIRATORY (INHALATION) at 14:24

## 2019-03-11 RX ADMIN — ALBUTEROL SULFATE 2 PUFF: 90 AEROSOL, METERED RESPIRATORY (INHALATION) at 08:45

## 2019-03-11 RX ADMIN — PREDNISONE 2.5 MG: 2.5 TABLET ORAL at 08:49

## 2019-03-11 RX ADMIN — PANTOPRAZOLE SODIUM 20 MG: 20 TABLET, DELAYED RELEASE ORAL at 06:23

## 2019-03-11 RX ADMIN — FLUTICASONE FUROATE AND VILANTEROL TRIFENATATE 1 PUFF: 200; 25 POWDER RESPIRATORY (INHALATION) at 08:49

## 2019-03-11 RX ADMIN — THEOPHYLLINE ANHYDROUS 200 MG: 200 CAPSULE, EXTENDED RELEASE ORAL at 08:49

## 2019-03-11 RX ADMIN — LEVOTHYROXINE SODIUM 125 MCG: 125 TABLET ORAL at 06:23

## 2019-03-11 RX ADMIN — QUETIAPINE FUMARATE 450 MG: 50 TABLET, FILM COATED ORAL at 21:42

## 2019-03-11 RX ADMIN — LITHIUM CARBONATE EXTENDED-RELEASE TABLET 300 MG: 300 TABLET, FILM COATED, EXTENDED RELEASE ORAL at 08:49

## 2019-03-11 NOTE — PROGRESS NOTES
Visible on the unit  Social with SELECT few peers; select staff  Wears O2 at 3L/minute continuously  She brightens on approach  She denies depression but states "I get a lot of anxiety sometimes   idiopathic thrombocytopenic purpura is usually about my breathing"  Reinforced breathing technique for relaxation  She later admitted "I can't wait to have a cigarette!"    She denies S/HI    Will continue to monitor

## 2019-03-11 NOTE — PROGRESS NOTES
· Patient weaned from 5mg prednisone daily to 2 5 daily on 3/6/19  · Patient maintaining oxygen saturations  VSS  Patient uses 3L NC at baseline  · Prednisone can be discontinued today 03/11  · Patient does not require prednisone upon discharge    · Would recommend follow up with PCP

## 2019-03-11 NOTE — CASE MANAGEMENT
Spoke with Nikolas Vinicius @[facility] [phone] who stated that her boss, Katey Britt, would like to set up a CSP meeting at the hospital  When CM inquired about the purpose of this meeting, Tiffanymusaneli Vinicius stated that Katey Britt just wants to get everyone on the same page  CM reminded her again that pt does not need to be in the hospital anymore and the only thing hindering d/c is pt's placement  CM asked if Katey Britt was available to talk -- Rupinderneli Vinicius stated that she was waiting to hear from her and would relay information to CM once she has an update

## 2019-03-11 NOTE — PROGRESS NOTES
Pt calm, cooperative, out in dayroom all evening watching television  Remains on 3L oxygen  Denies all s/s  Medication compliant

## 2019-03-11 NOTE — PLAN OF CARE
Problem: Alteration in Thoughts and Perception  Goal: Treatment Goal: Gain control of psychotic behaviors/thinking, reduce/eliminate presenting symptoms and demonstrate improved reality functioning upon discharge  Outcome: Progressing     Problem: Depression  Goal: Treatment Goal: Demonstrate behavioral control of depressive symptoms, verbalize feelings of improved mood/affect, and adopt new coping skills prior to discharge  Outcome: Progressing  Goal: Verbalize thoughts and feelings  Description  Interventions:  - Assess and re-assess patient's level of risk   - Engage patient in 1:1 interactions, daily, for a minimum of 15 minutes   - Encourage patient to express feelings, fears, frustrations, hopes    Outcome: Progressing  Goal: Refrain from harming self  Description  Interventions:  - Monitor patient closely, per order   - Supervise medication ingestion, monitor effects and side effects    Outcome: Progressing  Goal: Refrain from isolation  Description  Interventions:  - Develop a trusting relationship   - Encourage socialization    Outcome: Progressing  Goal: Refrain from self-neglect  Outcome: Progressing  Goal: Attend and participate in unit activities, including therapeutic, recreational, and educational groups  Description  Interventions:  - Provide therapeutic and educational activities daily, encourage attendance and participation, and document same in the medical record    Outcome: Progressing  Goal: Complete daily ADLs, including personal hygiene independently, as able  Description  Interventions:  - Observe, teach, and assist patient with ADLS  -  Monitor and promote a balance of rest/activity, with adequate nutrition and elimination    Outcome: Progressing     Problem: Anxiety  Goal: Anxiety is at manageable level  Description  Interventions:  - Assess and monitor patient's anxiety level     - Monitor for signs and symptoms of anxiety both physical and emotional (heart palpitations, chest pain, shortness of breath, headaches, nausea, feeling jumpy, restlessness, irritable, apprehensive)  - Collaborate with interdisciplinary team and initiate plan and interventions as ordered    - [place] patient to unit/surroundings  - Explain treatment plan  - Encourage participation in care  - Encourage verbalization of concerns/fears  - Identify coping mechanisms  - Assist in developing anxiety-reducing skills  - Administer/offer alternative therapies  - Limit or eliminate stimulants   Outcome: Progressing

## 2019-03-11 NOTE — PROGRESS NOTES
Progress Note - Behavioral Health   Arvil Model 66 y o  female MRN: [id number]  Unit/Bed#: [facility] 554-39 Encounter: [id number]    The patient was seen for continuing care and reviewed with treatment team   Staff reports the patient has been bright, visible, and social   She had a good tour at [facility] on Friday  Placement is pending  The patient is friendly and bright on approach  She did voice multiple complaints about the facility where we are looking to discharge her  She says she is worried about her safety there because they were too gung ho to keep me for lunch" I think they were trying to poison me"  Complains that there were 20 men sitting on chairs in the living room Janay Holman  She is worried about the mental state of the other residents  She is worried that they could be an unlicensed facility  Remains fixated on her oxygen and how she could possibly run now over there  She did admit that the bedroom and bathrooms were very nice and it looked like a nice place to go  Denies depression but is feeling anxious about discharge  Did not report auditory hallucinations today  No SI  Sleeping and eating well  Tolerating medications with no side effects  Mental Status Evaluation:  Appearance:  Good eye contact and disheveled   Behavior:  calm, cooperative and friendly   Mood:  anxious   Affect: appropriate   Speech: Normal rate and Normal volume   Thought Process:   Tangential   Thought Content:  Paranoid and mistrustful and Delusions of persecution   Perceptual Disturbances: Denies hallucinations and does not appear to be responding to internal stimuli   Risk Potential: No suicidal or homicidal ideation   Attention/Concentration [place] than expected   Orientation:   Oriented x3   Gait/Station: Not observed   Motor Activity: No abnormal movement noted     Progress Toward Goals:  No change    Assessment/Plan    Principal Problem:    Schizoaffective disorder, [facility] ([facility]      Recommended Treatment:     Continue Seroquel 50 mg q a m  and 450 mg q h s  Continue lithium 300 mg daily and 450 mg HS  Will check a lithium level tomorrow in anticipation of discharge date approaching  Continue with pharmacotherapy, group therapy, milieu therapy and occupational therapy    The patient will be maintained on the following medications:    Current Facility-Administered Medications:  acetaminophen 650 mg Oral Q6H PRN Ernesto Rings, CRNP   acetaminophen 650 mg Oral Q4H PRN Ernesto Rings, CRNP   acetaminophen 975 mg Oral Q6H PRN Ernesto Rings, CRNP   albuterol 2 puff Inhalation TID Ashley Tripathi MD   benztropine 1 mg Intramuscular BID PRN Ernesto Rings, CRNP   benztropine 1 mg Oral BID PRN Ernesto Rings, CRNP   calcium carbonate 500 mg Oral Daily PRN Charly Roberts MD   EPINEPHrine PF 0 15 mg Intramuscular PRN Jennifer Macadamia, CRNP   fluticasone-vilanterol 1 puff Inhalation Daily Wang Pitts MD   gabapentin 100 mg Oral TID PRN Cara Gaitan, CRNP   hydrOXYzine HCL 25 mg Oral Q6H PRN Ernesto Rings, CRNP   levalbuterol 1 25 mg Nebulization Q8H PRN Ashley Tripathi MD   Or       sodium chloride 3 mL Nebulization Q8H PRN Ashley Tripathi MD   levothyroxine 125 mcg Oral Daily Jennifer Macadamia, CRNP   lithium carbonate 300 mg Oral Daily Carataylor Gaitan, CRNP   lithium carbonate 450 mg Oral HS Cara Gaitan, CRNP   LORazepam 1 mg Intramuscular Q4H PRN Ernesto Rings, CRNP   magnesium hydroxide 30 mL Oral Daily PRN Ernesto Rings, CRNP   nicotine 14 mg Transdermal Daily Jennifer Macadamia, CRNP   nicotine polacrilex 2 mg Oral Q2H PRN Ernesto Rings, CRNP   pantoprazole 20 mg Oral Daily Vicky Ciminifaisal, CRNP   predniSONE 2 5 mg Oral Daily Charly Roberts MD   QUEtiapine 450 mg Oral HS Carataylor Gaitan, CRNP   QUEtiapine 50 mg Oral Daily Colette Gray MD   theophylline 200 mg Oral Daily Vicky Ciminieri, CRNP   traZODone 25 mg Oral HS PRN Ernesto Rings, CRNP       Risks, benefits and possible side effects of Medications:   Patient does not verbalize understanding at this time and will require further explanation

## 2019-03-11 NOTE — PROGRESS NOTES
Pt is calm, cooperative with care and medication compliant  Pt reports feeling anxious, but denies all other s/s including SI and HI  Pt is visible on the unit and comes out for meals and groups  Pt is social with select peers and brightens upon approach  Pt is currently attending group  Will continue to monitor

## 2019-03-11 NOTE — CASE MANAGEMENT
Per pt's request, CM called her María Kasper, to request that she bring pt's purse, checkbook, bishop/ID, and a few other personal items to the hospital so pt has those items prior to her transition to [address]  Donna Clark stated that she will drop off those items sometime this week

## 2019-03-11 NOTE — PLAN OF CARE
Pt with bright affect and more appropriate during seated structured groups  She was able to listen, as well as reflect,her thoughts today

## 2019-03-12 PROCEDURE — 94640 AIRWAY INHALATION TREATMENT: CPT

## 2019-03-12 PROCEDURE — 99232 SBSQ HOSP IP/OBS MODERATE 35: CPT | Performed by: NURSE PRACTITIONER

## 2019-03-12 PROCEDURE — 94664 DEMO&/EVAL PT USE INHALER: CPT

## 2019-03-12 PROCEDURE — 94760 N-INVAS EAR/PLS OXIMETRY 1: CPT

## 2019-03-12 RX ADMIN — LITHIUM CARBONATE 450 MG: 450 TABLET, EXTENDED RELEASE ORAL at 22:22

## 2019-03-12 RX ADMIN — ALBUTEROL SULFATE 2 PUFF: 90 AEROSOL, METERED RESPIRATORY (INHALATION) at 13:35

## 2019-03-12 RX ADMIN — ALBUTEROL SULFATE 2 PUFF: 90 AEROSOL, METERED RESPIRATORY (INHALATION) at 20:30

## 2019-03-12 RX ADMIN — THEOPHYLLINE ANHYDROUS 200 MG: 200 CAPSULE, EXTENDED RELEASE ORAL at 08:55

## 2019-03-12 RX ADMIN — LEVALBUTEROL HYDROCHLORIDE 1.25 MG: 1.25 SOLUTION, CONCENTRATE RESPIRATORY (INHALATION) at 19:34

## 2019-03-12 RX ADMIN — QUETIAPINE FUMARATE 450 MG: 50 TABLET, FILM COATED ORAL at 22:22

## 2019-03-12 RX ADMIN — FLUTICASONE FUROATE AND VILANTEROL TRIFENATATE 1 PUFF: 200; 25 POWDER RESPIRATORY (INHALATION) at 08:55

## 2019-03-12 RX ADMIN — NICOTINE 14 MG: 14 PATCH, EXTENDED RELEASE TRANSDERMAL at 08:55

## 2019-03-12 RX ADMIN — PANTOPRAZOLE SODIUM 20 MG: 20 TABLET, DELAYED RELEASE ORAL at 06:11

## 2019-03-12 RX ADMIN — LITHIUM CARBONATE EXTENDED-RELEASE TABLET 300 MG: 300 TABLET, FILM COATED, EXTENDED RELEASE ORAL at 08:55

## 2019-03-12 RX ADMIN — LEVOTHYROXINE SODIUM 125 MCG: 125 TABLET ORAL at 06:11

## 2019-03-12 RX ADMIN — ALBUTEROL SULFATE 2 PUFF: 90 AEROSOL, METERED RESPIRATORY (INHALATION) at 08:55

## 2019-03-12 RX ADMIN — QUETIAPINE 50 MG: 50 TABLET ORAL at 08:55

## 2019-03-12 NOTE — PLAN OF CARE
Pt was able to attend and fully engage in all three groups today with a bright social demeanor  She displayed min  insight into leaving the hospital as a healthy risk  Pt has not been discussing any thoughts of others wanting to hurt her, if she were not in the hospital yet refuses to discuss her possible transfer to another living facility stating that it is too confidential to talk about to others

## 2019-03-12 NOTE — PLAN OF CARE
Problem: Alteration in Thoughts and Perception  Goal: Treatment Goal: Gain control of psychotic behaviors/thinking, reduce/eliminate presenting symptoms and demonstrate improved reality functioning upon discharge  Outcome: Progressing     Problem: Depression  Goal: Treatment Goal: Demonstrate behavioral control of depressive symptoms, verbalize feelings of improved mood/affect, and adopt new coping skills prior to discharge  Outcome: Progressing  Goal: Verbalize thoughts and feelings  Description  Interventions:  - Assess and re-assess patient's level of risk   - Engage patient in 1:1 interactions, daily, for a minimum of 15 minutes   - Encourage patient to express feelings, fears, frustrations, hopes    Outcome: Progressing  Goal: Refrain from harming self  Description  Interventions:  - Monitor patient closely, per order   - Supervise medication ingestion, monitor effects and side effects    Outcome: Progressing  Goal: Refrain from isolation  Description  Interventions:  - Develop a trusting relationship   - Encourage socialization    Outcome: Progressing  Goal: Refrain from self-neglect  Outcome: Progressing  Goal: Attend and participate in unit activities, including therapeutic, recreational, and educational groups  Description  Interventions:  - Provide therapeutic and educational activities daily, encourage attendance and participation, and document same in the medical record    Outcome: Progressing  Goal: Complete daily ADLs, including personal hygiene independently, as able  Description  Interventions:  - Observe, teach, and assist patient with ADLS  -  Monitor and promote a balance of rest/activity, with adequate nutrition and elimination    Outcome: Progressing     Problem: Anxiety  Goal: Anxiety is at manageable level  Description  Interventions:  - Assess and monitor patient's anxiety level     - Monitor for signs and symptoms of anxiety both physical and emotional (heart palpitations, chest pain, shortness of breath, headaches, nausea, feeling jumpy, restlessness, irritable, apprehensive)  - Collaborate with interdisciplinary team and initiate plan and interventions as ordered    - [place] patient to unit/surroundings  - Explain treatment plan  - Encourage participation in care  - Encourage verbalization of concerns/fears  - Identify coping mechanisms  - Assist in developing anxiety-reducing skills  - Administer/offer alternative therapies  - Limit or eliminate stimulants   Outcome: Progressing

## 2019-03-12 NOTE — PLAN OF CARE
Problem: Alteration in Thoughts and Perception  Goal: Treatment Goal: Gain control of psychotic behaviors/thinking, reduce/eliminate presenting symptoms and demonstrate improved reality functioning upon discharge  Outcome: Progressing     Problem: Depression  Goal: Treatment Goal: Demonstrate behavioral control of depressive symptoms, verbalize feelings of improved mood/affect, and adopt new coping skills prior to discharge  Outcome: Progressing  Goal: Verbalize thoughts and feelings  Description  Interventions:  - Assess and re-assess patient's level of risk   - Engage patient in 1:1 interactions, daily, for a minimum of 15 minutes   - Encourage patient to express feelings, fears, frustrations, hopes    Outcome: Progressing  Goal: Refrain from harming self  Description  Interventions:  - Monitor patient closely, per order   - Supervise medication ingestion, monitor effects and side effects    Outcome: Progressing  Goal: Refrain from isolation  Description  Interventions:  - Develop a trusting relationship   - Encourage socialization    Outcome: Progressing  Goal: Refrain from self-neglect  Outcome: Progressing  Goal: Attend and participate in unit activities, including therapeutic, recreational, and educational groups  Description  Interventions:  - Provide therapeutic and educational activities daily, encourage attendance and participation, and document same in the medical record    Outcome: Progressing     Problem: Anxiety  Goal: Anxiety is at manageable level  Description  Interventions:  - Assess and monitor patient's anxiety level  - Monitor for signs and symptoms of anxiety both physical and emotional (heart palpitations, chest pain, shortness of breath, headaches, nausea, feeling jumpy, restlessness, irritable, apprehensive)  - Collaborate with interdisciplinary team and initiate plan and interventions as ordered    - [place] patient to unit/surroundings  - Explain treatment plan  - Encourage participation in care  - Encourage verbalization of concerns/fears  - Identify coping mechanisms  - Assist in developing anxiety-reducing skills  - Administer/offer alternative therapies  - Limit or eliminate stimulants   Outcome: Progressing     Problem: DISCHARGE PLANNING  Goal: Discharge to home or other facility with appropriate resources  Description  INTERVENTIONS:  - Identify barriers to discharge w/patient and caregiver  - Arrange for needed discharge resources and transportation as appropriate  - Identify discharge learning needs (meds, wound care, etc )  - Arrange for interpretive services to assist at discharge as needed  - Refer to Case Management Department for coordinating discharge planning if the patient needs post-hospital services based on physician/advanced practitioner order or complex needs related to functional status, cognitive ability, or social support system   Outcome: Progressing     Problem: Ineffective Coping  Goal: Participates in unit activities  Description  Interventions:  - Provide therapeutic environment   - Provide required programming   - Redirect inappropriate behaviors    Outcome: Progressing     Problem: Depression  Goal: Complete daily ADLs, including personal hygiene independently, as able  Description  Interventions:  - Observe, teach, and assist patient with ADLS  -  Monitor and promote a balance of rest/activity, with adequate nutrition and elimination    Outcome: Not Progressing

## 2019-03-12 NOTE — PROGRESS NOTES
Pt was calm and cooperative with care  Pt was medication compliant  Pt reported anxiety RT nearing discharge, but denied all other symptoms  Pt was out in the milieu, with minimal interaction with her peers  Labs were attempted twice this morning but were unsuccessful  Pt refused a third attempt  Will continue to monitor

## 2019-03-12 NOTE — PROGRESS NOTES
Progress Note - Behavioral Health   Marguerite Angel 66 y o  female MRN: [id number]  Unit/Bed#: [facility] 986-05 Encounter: [id number]    The patient was seen for continuing care and reviewed with treatment team   Staff reports the patient has been visible and social with select peers  She has been medication compliant  She has been followed by Internal Medicine and is tapering off prednisone  We were unable to obtain a lithium level this morning  We are waiting for placement  Patient is bright and friendly on approach  No depression but is anxious about upcoming discharge  Continues to worry about issues that she sees with her tentative discharge home including running out of oxygen, not being able to go down the stairs in an emergency, too many male residents etc  Eating and sleeping well  No SI  Has not spontaneously mentioned her auditory hallucinations in several days  Mental Status Evaluation:  Appearance:  Good eye contact and disheveled   Behavior:  cooperative and friendly   Mood:  anxious   Affect: appropriate   Speech: Normal rate and Normal volume   Thought Process:  Goal directed and coherent   Thought Content:  Residual delusions   Perceptual Disturbances: Denies hallucinations and does not appear to be responding to internal stimuli   Risk Potential: No suicidal or homicidal ideation   Attention/Concentration [place] than expected   Orientation:   Oriented x3   Gait/Station: Not observed   Motor Activity: No abnormal movement noted     Progress Toward Goals: At baseline    Assessment/Plan    Principal Problem:    Schizoaffective disorder, bipolar type ([address] )      Recommended Treatment:     Continue Seroquel 50 mg q a m  and 450 mg q h s      Continue lithium 300 mg daily and 450 mg HS  Continue with pharmacotherapy, group therapy, milieu therapy and occupational therapy    The patient will be maintained on the following medications:    Current Facility-Administered Medications:  acetaminophen 650 mg Oral Q6H PRN Prince Moffett, CRNP   acetaminophen 650 mg Oral Q4H PRN Prince Moffett, CRNP   acetaminophen 975 mg Oral Q6H PRN Prince Moffett, CRNP   albuterol 2 puff Inhalation TID Fallon Duke MD   benztropine 1 mg Intramuscular BID PRN Prince Moffett, CRNP   benztropine 1 mg Oral BID PRN Prince Moffett, CRNP   calcium carbonate 500 mg Oral Daily PRN Christ Kc MD   EPINEPHrine PF 0 15 mg Intramuscular PRN Vicky Ciminifaisal, CRNP   fluticasone-vilanterol 1 puff Inhalation Daily Cathy Wood MD   gabapentin 100 mg Oral TID PRN Antoine [place], CRNP   hydrOXYzine HCL 25 mg Oral Q6H PRN Prince Moffett, ABILIO   levalbuterol 1 25 mg Nebulization Q8H PRN Fallon Duke MD   Or       sodium chloride 3 mL Nebulization Q8H PRN Fallon Duke MD   levothyroxine 125 mcg Oral Daily Vicky Fields, CRNP   lithium carbonate 300 mg Oral Daily Reford Shave, CRNP   lithium carbonate 450 mg Oral HS Refmarisel [place], CRNP   LORazepam 1 mg Intramuscular Q4H PRN Prince Moffett, CRNP   magnesium hydroxide 30 mL Oral Daily PRN Prince Moffett, CRNP   nicotine 14 mg Transdermal Daily Aristides Sanchez, CRNP   nicotine polacrilex 2 mg Oral Q2H PRN Prince Mofftet, JOSE CARLOSNP   pantoprazole 20 mg Oral Daily Vicky Fields, CRNP   QUEtiapine 450 mg Oral HS Antoine [place], CRNP   QUEtiapine 50 mg Oral Daily Stanley Salvador MD   theophylline 200 mg Oral Daily Vicky Ciminifaisal, CRNP   traZODone 25 mg Oral HS PRN ABILIO Rodriguez       Risks, benefits and possible side effects of Medications:   Patient does not verbalize understanding at this time and will require further explanation

## 2019-03-12 NOTE — MEDICAL STUDENT
MEDICAL STUDENT  Inpatient Progress Note for TRAINING ONLY  Not Part of Legal Medical Record       Progress Note - Cathy Arroyo 66 y o  female MRN: [id number]    Unit/Bed#: [facility] Patient 585-62 Encounter: [id number]      Assessment:  Fredi Lao today was eating breakfast this am and initially refused assessment  She was willing to talk after she finished eating  She is dressed in jeans and a t-shirt  She appears disheveled  Parts of her breakfast were on the floor around her  Plan:  Continue medications, Alpha Mixer is awaiting discharge  Subjective:   Fredi Lao states that she is eating all of her meals and she is sleeping well  Denies SI/HI  She still states that she is tired during the day  Pt wears oxygen 24/7  She was willing to talk about discharge and where she will be going on discharge  She stated that the house was clean with a nice big bathroom  She stated that she was bothered by the fact that the walk to the house had some old cars and hedges that blocked the view of the street from the house  The other thing that she said was bad was that there were about 21 male residents to 1 female residents and only 1 staff member  She remains fixated on having someone to care for her oxygen tanks, even though she states that she has a portable oxygen unit of her own as well as a condenser for when she is in her room  Objective:     Vitals: Blood pressure 108/55, pulse 75, temperature 98 2 °F (36 8 °C), temperature source Oral, resp  rate 18, height 5' 3" (1 6 m), weight 66 1 kg (145 lb 11 6 oz), SpO2 100 %  ,Body mass index is 25 81 kg/m²      No intake or output data in the 24 hours ending 03/12/19 1019    Physical Exam: No exam performed today,     Invasive Devices          None

## 2019-03-13 PROCEDURE — 99232 SBSQ HOSP IP/OBS MODERATE 35: CPT | Performed by: NURSE PRACTITIONER

## 2019-03-13 RX ADMIN — THEOPHYLLINE ANHYDROUS 200 MG: 200 CAPSULE, EXTENDED RELEASE ORAL at 08:54

## 2019-03-13 RX ADMIN — LITHIUM CARBONATE EXTENDED-RELEASE TABLET 300 MG: 300 TABLET, FILM COATED, EXTENDED RELEASE ORAL at 08:54

## 2019-03-13 RX ADMIN — FLUTICASONE FUROATE AND VILANTEROL TRIFENATATE 1 PUFF: 200; 25 POWDER RESPIRATORY (INHALATION) at 08:56

## 2019-03-13 RX ADMIN — ALBUTEROL SULFATE 2 PUFF: 90 AEROSOL, METERED RESPIRATORY (INHALATION) at 22:20

## 2019-03-13 RX ADMIN — QUETIAPINE 50 MG: 50 TABLET ORAL at 08:54

## 2019-03-13 RX ADMIN — ALBUTEROL SULFATE 2 PUFF: 90 AEROSOL, METERED RESPIRATORY (INHALATION) at 08:52

## 2019-03-13 RX ADMIN — LITHIUM CARBONATE 450 MG: 450 TABLET, EXTENDED RELEASE ORAL at 22:20

## 2019-03-13 RX ADMIN — NICOTINE 14 MG: 14 PATCH, EXTENDED RELEASE TRANSDERMAL at 08:58

## 2019-03-13 RX ADMIN — QUETIAPINE FUMARATE 450 MG: 50 TABLET, FILM COATED ORAL at 22:20

## 2019-03-13 RX ADMIN — ALBUTEROL SULFATE 2 PUFF: 90 AEROSOL, METERED RESPIRATORY (INHALATION) at 13:50

## 2019-03-13 RX ADMIN — LEVOTHYROXINE SODIUM 125 MCG: 125 TABLET ORAL at 06:17

## 2019-03-13 RX ADMIN — PANTOPRAZOLE SODIUM 20 MG: 20 TABLET, DELAYED RELEASE ORAL at 06:17

## 2019-03-13 NOTE — PLAN OF CARE
Problem: Alteration in Thoughts and Perception  Goal: Treatment Goal: Gain control of psychotic behaviors/thinking, reduce/eliminate presenting symptoms and demonstrate improved reality functioning upon discharge  Outcome: Progressing     Problem: Depression  Goal: Treatment Goal: Demonstrate behavioral control of depressive symptoms, verbalize feelings of improved mood/affect, and adopt new coping skills prior to discharge  Outcome: Progressing  Goal: Verbalize thoughts and feelings  Description  Interventions:  - Assess and re-assess patient's level of risk   - Engage patient in 1:1 interactions, daily, for a minimum of 15 minutes   - Encourage patient to express feelings, fears, frustrations, hopes    Outcome: Progressing  Goal: Refrain from harming self  Description  Interventions:  - Monitor patient closely, per order   - Supervise medication ingestion, monitor effects and side effects    Outcome: Progressing  Goal: Refrain from isolation  Description  Interventions:  - Develop a trusting relationship   - Encourage socialization    Outcome: Progressing  Goal: Refrain from self-neglect  Outcome: Progressing  Goal: Attend and participate in unit activities, including therapeutic, recreational, and educational groups  Description  Interventions:  - Provide therapeutic and educational activities daily, encourage attendance and participation, and document same in the medical record    Outcome: Progressing  Goal: Complete daily ADLs, including personal hygiene independently, as able  Description  Interventions:  - Observe, teach, and assist patient with ADLS  -  Monitor and promote a balance of rest/activity, with adequate nutrition and elimination    Outcome: Progressing     Problem: Anxiety  Goal: Anxiety is at manageable level  Description  Interventions:  - Assess and monitor patient's anxiety level     - Monitor for signs and symptoms of anxiety both physical and emotional (heart palpitations, chest pain, shortness of breath, headaches, nausea, feeling jumpy, restlessness, irritable, apprehensive)  - Collaborate with interdisciplinary team and initiate plan and interventions as ordered    - [place] patient to unit/surroundings  - Explain treatment plan  - Encourage participation in care  - Encourage verbalization of concerns/fears  - Identify coping mechanisms  - Assist in developing anxiety-reducing skills  - Administer/offer alternative therapies  - Limit or eliminate stimulants   Outcome: Progressing

## 2019-03-13 NOTE — CASE MANAGEMENT
Email correspondence with [facility], Irma Lauren, and Aneta Boogie from [facility], and [facility] stated that a long term residential will always prefer an overnight visit to ensure the resident is comfortable, but if that is absolutely not possible, they will adjust  However, they need pt to have supports in place  Her insurance needs to be switched to [facility] and they can connect her with necessary [facility] supports and go from there  Mati Perez is also requesting a CSP meeting to tie up loose ends before pt officially transitions  CM will follow up and provide support as needed

## 2019-03-13 NOTE — CASE MANAGEMENT
Met with pt to call Social Security Office to change pt's address and get benefits switched  SS office closes at 12pm on Wednesdays  CM will follow up tomorrow morning to assist pt with phone call  Pt states that she would like to be seen by Dr Jadon Potter or Dr Jessica Smith through [facility], if possible, once she's d/c'ed  Pt is also open to having services through [facility] or [facility]  CSP meeting is scheduled for Friday at [address] -- with [facility]) and Flex Graham at [facility]  ACT referral sent to [facility]  Doroteo Klaudia stated that they can pick her up as a new client next week and complete the intake

## 2019-03-13 NOTE — PROGRESS NOTES
Progress Note - Behavioral Health   Ama Turner 66 y o  female MRN: [id number]  Unit/Bed#: [facility] 361-10 Encounter: [id number]    Assessment/Plan   Principal Problem:    Schizoaffective disorder, bipolar type ([address] )      Subjective:Patient was seen today for continuation of care, records reviewed and  patient was discussed with the morning case review team Lithium level was unobtainable this am and will be re-ordered for tomorrow am  Ornavneet Leger remains medication compliance and by nursing staff she has been visible in the milieu but non-interactive  "I already sent two people your way, I don't want to talk to new people today, I know my hospital rights" "I am not stupid Caitlin"  Patient denies endorsing any suicidal or homicidal ideation  Presented very labile and refusing to further assessment  Gregoria Arsen did seem to be experiencing any  psychotic symptoms during our encounter and called Shantal Holbrook to the PA with me today  Denies any acute side effects from medications  Ambulate with the oxygen tank       Current Medications:  Current Facility-Administered Medications   Medication Dose Route Frequency    acetaminophen (TYLENOL) tablet 650 mg  650 mg Oral Q6H PRN    acetaminophen (TYLENOL) tablet 650 mg  650 mg Oral Q4H PRN    acetaminophen (TYLENOL) tablet 975 mg  975 mg Oral Q6H PRN    albuterol (PROVENTIL HFA,VENTOLIN HFA) inhaler 2 puff  2 puff Inhalation TID    benztropine (COGENTIN) injection 1 mg  1 mg Intramuscular BID PRN    benztropine (COGENTIN) tablet 1 mg  1 mg Oral BID PRN    calcium carbonate (TUMS) chewable tablet 500 mg  500 mg Oral Daily PRN    EPINEPHrine PF (ADRENALIN) 1 mg/mL injection 0 15 mg  0 15 mg Intramuscular PRN    fluticasone-vilanterol (BREO ELLIPTA) 200-25 MCG/INH inhaler 1 puff  1 puff Inhalation Daily    gabapentin (NEURONTIN) capsule 100 mg  100 mg Oral TID PRN    hydrOXYzine HCL (ATARAX) tablet 25 mg  25 mg Oral Q6H PRN    levalbuterol (XOPENEX) inhalation solution 1 25 mg  1 25 mg Nebulization Q8H PRN    Or    sodium chloride 0 9 % inhalation solution 3 mL  3 mL Nebulization Q8H PRN    levothyroxine tablet 125 mcg  125 mcg Oral Daily    lithium carbonate (LITHOBID) CR tablet 300 mg  300 mg Oral Daily    lithium carbonate (LITHOBID) CR tablet 450 mg  450 mg Oral HS    LORazepam (ATIVAN) 2 mg/mL injection 1 mg  1 mg Intramuscular Q4H PRN    magnesium hydroxide (MILK OF MAGNESIA) 400 mg/5 mL oral suspension 30 mL  30 mL Oral Daily PRN    nicotine (NICODERM CQ) 14 mg/24hr TD 24 hr patch 14 mg  14 mg Transdermal Daily    nicotine polacrilex (NICORETTE) gum 2 mg  2 mg Oral Q2H PRN    pantoprazole (PROTONIX) EC tablet 20 mg  20 mg Oral Daily    QUEtiapine (SEROquel) tablet 450 mg  450 mg Oral HS    QUEtiapine (SEROquel) tablet 50 mg  50 mg Oral Daily    theophylline (JEF-24) 24 hr capsule 200 mg  200 mg Oral Daily    traZODone (DESYREL) tablet 25 mg  25 mg Oral HS PRN       Behavioral Health Medications: all current active meds have been reviewed and continue current psychiatric medications  Vitals:  Vitals:    03/13/19 0738   BP: 102/53   Pulse: 70   Resp: 16   Temp: 99 4 °F (37 4 °C)   SpO2: 96%       Laboratory results:    I have personally reviewed all pertinent laboratory/tests results  Labs in last 72 hours: No results for input(s): WBC, RBC, HGB, HCT, PLT, RDW, NEUTROABS, SODIUM, K, CL, CO2, BUN, CREATININE, GLUC, GLUF, CALCIUM, AST, ALT, ALKPHOS, TP, ALB, TBILI, CHOLESTEROL, HDL, TRIG, LDLCALC, VALPROICTOT, CARBAMAZEPIN, LITHIUM, AMMONIA, [id number], FREET4, T3FREE, PREGTESTUR, PREGSERUM, HCG, HCGQUANT, RPR in the last 72 hours  Invalid input(s):  RBC  Lithium:   Lab Results   Component Value Date    LITHIUM 1 0 02/22/2019       Psychiatric Review of Systems:    Behavior over the last 24 hours:  unchanged     Sleep: IRMA  Appetite: normal  Medication side effects: No  ROS: no complaints    Mental Status Evaluation:    Appearance:  disheveled, looks older than stated age   Behavior:  angry, guarded, uncooperative, evasive, sarcastic, does not want to talk   Speech:  pressured   Mood:  labile, irritable   Affect:  constricted   Thought Process:  negative thinking   Associations: unable to assess - does not answer   Thought Content:  chronic   Perceptual Disturbances: appears responding to internal stimuli   Risk Potential: Suicidal ideation - None  Homicidal ideation - None  Potential for aggression - Not at present   Sensorium:  oriented to person, place and time/date   Memory:  patient does not answer   Consciousness:  alert and awake   Attention: attention span and concentration: unable to assess due to lack of cooperation   Insight:  limited   Judgment: limited   Gait/Station: unable to assess   Motor Activity: no abnormal movements       Progress Toward Goals:     Uncganged    Assessment/Plan   Principal Problem:    Schizoaffective disorder, bipolar type ([facility] [address] )      Recommended Treatment: Continue with group therapy, milieu therapy and occupational therapy  Treatment plan and medication changes discussed and per the attending physician the plan is: 1  Behavioral Health checks every 7 minutes  2  Continue with current medication regimen  3  Continue with VS, O2 therapy, VS  4  Will review [place] labs in the a m   5  CM continue with search for long term residence  Risks / Benefits of Treatment:     Risks, benefits, and possible side effects of medications explained to patient and patient verbalizes understanding and agreement for treatment  Counseling / Coordination of Care:     Patient's progress reviewed with nursing staff  Medications, treatment progress and treatment plan reviewed with patient  Supportive counseling provided to the patient            Ismael Ruiz

## 2019-03-13 NOTE — PROGRESS NOTES
Pt calm and medication compliant  Denies all s/s except anxiety  Concerned for impending discharge  Visible in milieu, but little interaction with others

## 2019-03-13 NOTE — PROGRESS NOTES
Patient has moderate depression and anxiety  She denies SI/HI/ arrieta  Compliant with medications  She is present in the milieu and social  Asked her if she would like to shower and she refused  Asked her if she would like to take a bed bath and she said she would let me know  1130: Patient because paranoid  She said the new tech is nice  She said she warmed my coffee but I am not sure if she put anything in it to poison me  She said that she was letting me know in case she became ill

## 2019-03-13 NOTE — PLAN OF CARE
Pt  Attended morning community meeting with appropriate social interaction from a wall side chair  She continues to sit away from peers for the majority of her time in the day room and requested to remain in her bed mid morning to get some alone/quiet time

## 2019-03-13 NOTE — MEDICAL STUDENT
MEDICAL STUDENT  Inpatient Progress Note for TRAINING ONLY  Not Part of Legal Medical Record       Progress Note - Nolberto Galvin 66 y o  female MRN: [id number]    Unit/Bed#: Allie Ramirez 606-01 Encounter: [id number]      Assessment:  Pt continues to appear disheveled  Refused assessment twice today  Said no more than hello and that she was tired and did not want to talk  Plan:  Continue medications, oxygen, safety  Subjective:   None discussed  Objective:     Vitals: Blood pressure 102/53, pulse 70, temperature 99 4 °F (37 4 °C), temperature source Oral, resp  rate 16, height 5' 3" (1 6 m), weight 66 1 kg (145 lb 11 6 oz), SpO2 96 %  ,Body mass index is 25 81 kg/m²  No intake or output data in the 24 hours ending 03/13/19 1238    Physical Exam: No exam performed today       Invasive Devices          None

## 2019-03-14 LAB
ANION GAP SERPL CALCULATED.3IONS-SCNC: 6 MMOL/L (ref 5–14)
BUN SERPL-MCNC: 18 MG/DL (ref 5–25)
CALCIUM SERPL-MCNC: 9.3 MG/DL (ref 8.4–10.2)
CHLORIDE SERPL-SCNC: 106 MMOL/L (ref 97–108)
CO2 SERPL-SCNC: 27 MMOL/L (ref 22–30)
CREAT SERPL-MCNC: 0.66 MG/DL (ref 0.6–1.2)
GFR SERPL CREATININE-BSD FRML MDRD: 96 ML/MIN/1.73SQ M
GLUCOSE SERPL-MCNC: 80 MG/DL (ref 70–99)
LITHIUM SERPL-SCNC: 1.1 MMOL/L (ref 0.6–1.2)
POTASSIUM SERPL-SCNC: 4.3 MMOL/L (ref 3.6–5)
SODIUM SERPL-SCNC: 139 MMOL/L (ref 137–147)

## 2019-03-14 PROCEDURE — 99232 SBSQ HOSP IP/OBS MODERATE 35: CPT | Performed by: NURSE PRACTITIONER

## 2019-03-14 PROCEDURE — 80178 ASSAY OF LITHIUM: CPT | Performed by: NURSE PRACTITIONER

## 2019-03-14 PROCEDURE — 80048 BASIC METABOLIC PNL TOTAL CA: CPT | Performed by: NURSE PRACTITIONER

## 2019-03-14 RX ORDER — ALBUTEROL SULFATE 90 UG/1
2 AEROSOL, METERED RESPIRATORY (INHALATION)
Status: DISCONTINUED | OUTPATIENT
Start: 2019-03-14 | End: 2019-03-20 | Stop reason: HOSPADM

## 2019-03-14 RX ADMIN — QUETIAPINE FUMARATE 450 MG: 50 TABLET, FILM COATED ORAL at 22:01

## 2019-03-14 RX ADMIN — ALBUTEROL SULFATE 2 PUFF: 90 AEROSOL, METERED RESPIRATORY (INHALATION) at 17:09

## 2019-03-14 RX ADMIN — ALBUTEROL SULFATE 2 PUFF: 90 AEROSOL, METERED RESPIRATORY (INHALATION) at 08:46

## 2019-03-14 RX ADMIN — FLUTICASONE FUROATE AND VILANTEROL TRIFENATATE 1 PUFF: 200; 25 POWDER RESPIRATORY (INHALATION) at 08:46

## 2019-03-14 RX ADMIN — QUETIAPINE 50 MG: 50 TABLET ORAL at 08:45

## 2019-03-14 RX ADMIN — LITHIUM CARBONATE EXTENDED-RELEASE TABLET 300 MG: 300 TABLET, FILM COATED, EXTENDED RELEASE ORAL at 09:34

## 2019-03-14 RX ADMIN — THEOPHYLLINE ANHYDROUS 200 MG: 200 CAPSULE, EXTENDED RELEASE ORAL at 08:45

## 2019-03-14 RX ADMIN — PANTOPRAZOLE SODIUM 20 MG: 20 TABLET, DELAYED RELEASE ORAL at 06:18

## 2019-03-14 RX ADMIN — LITHIUM CARBONATE 450 MG: 450 TABLET, EXTENDED RELEASE ORAL at 22:21

## 2019-03-14 RX ADMIN — NICOTINE 14 MG: 14 PATCH, EXTENDED RELEASE TRANSDERMAL at 08:46

## 2019-03-14 RX ADMIN — LEVOTHYROXINE SODIUM 125 MCG: 125 TABLET ORAL at 06:18

## 2019-03-14 RX ADMIN — ALBUTEROL SULFATE 2 PUFF: 90 AEROSOL, METERED RESPIRATORY (INHALATION) at 22:00

## 2019-03-14 NOTE — PROGRESS NOTES
Progress Note - Behavioral Health   Sen Garvey 66 y o  female MRN: [id number]  Unit/Bed#: [facility] 463-59 Encounter: [id number]    Assessment/Plan   Principal Problem:    Schizoaffective disorder, bipolar type ([address] )      Subjective:Patient was seen today for continuation of care, records reviewed and  patient was discussed with the morning case review team Maggie today is very sociable and friendly  She presented with flight of ideas and was changing topics frequently  She reported several complaints such as ear impaction in left ear, also complained about trouble swallowing sporadically when she goes to bed and stays alone "I am scared at times it happened four times"  She denied any issues swallowing or chewing food and states it only happens when she is alone in her room  "I was told it could be panic attacks"  She also complained or "ear ringing" after she takes medications at bedtime and feeling bloated  Lithium level this am was 1 1 and dose was maintaned the same as advised by pharmacist in team meeting  There were no reported tremors, nausea, vomiting or diarrhea  Alease Severs reports good appetite being breakfast her favorite meal  She was seen in her room  Patient denies endorsing any suicidal or homicidal ideation  Does not seem to be experiencing any manic or psychotic symptoms but reported never mentioning her middle name "for security purposes" during our encounter and how her name arised from Bible source  Remains medication compliance  Denies any side effects from medications      Current Medications:  Current Facility-Administered Medications   Medication Dose Route Frequency    acetaminophen (TYLENOL) tablet 650 mg  650 mg Oral Q6H PRN    acetaminophen (TYLENOL) tablet 650 mg  650 mg Oral Q4H PRN    acetaminophen (TYLENOL) tablet 975 mg  975 mg Oral Q6H PRN    albuterol (PROVENTIL HFA,VENTOLIN HFA) inhaler 2 puff  2 puff Inhalation 4x Daily    benztropine (COGENTIN) injection 1 mg  1 mg Intramuscular BID PRN    benztropine (COGENTIN) tablet 1 mg  1 mg Oral BID PRN    calcium carbonate (TUMS) chewable tablet 500 mg  500 mg Oral Daily PRN    EPINEPHrine PF (ADRENALIN) 1 mg/mL injection 0 15 mg  0 15 mg Intramuscular PRN    fluticasone-vilanterol (BREO ELLIPTA) 200-25 MCG/INH inhaler 1 puff  1 puff Inhalation Daily    gabapentin (NEURONTIN) capsule 100 mg  100 mg Oral TID PRN    hydrOXYzine HCL (ATARAX) tablet 25 mg  25 mg Oral Q6H PRN    levalbuterol (XOPENEX) inhalation solution 1 25 mg  1 25 mg Nebulization Q8H PRN    Or    sodium chloride 0 9 % inhalation solution 3 mL  3 mL Nebulization Q8H PRN    levothyroxine tablet 125 mcg  125 mcg Oral Daily    lithium carbonate (LITHOBID) CR tablet 300 mg  300 mg Oral Daily    lithium carbonate (LITHOBID) CR tablet 450 mg  450 mg Oral HS    LORazepam (ATIVAN) 2 mg/mL injection 1 mg  1 mg Intramuscular Q4H PRN    magnesium hydroxide (MILK OF MAGNESIA) 400 mg/5 mL oral suspension 30 mL  30 mL Oral Daily PRN    nicotine (NICODERM CQ) 14 mg/24hr TD 24 hr patch 14 mg  14 mg Transdermal Daily    nicotine polacrilex (NICORETTE) gum 2 mg  2 mg Oral Q2H PRN    pantoprazole (PROTONIX) EC tablet 20 mg  20 mg Oral Daily    QUEtiapine (SEROquel) tablet 450 mg  450 mg Oral HS    QUEtiapine (SEROquel) tablet 50 mg  50 mg Oral Daily    theophylline (JEF-24) 24 hr capsule 200 mg  200 mg Oral Daily    traZODone (DESYREL) tablet 25 mg  25 mg Oral HS PRN       Behavioral Health Medications: all current active meds have been reviewed, continue current psychiatric medications and planned medication changes: Albuterol increase from TID to QID as requested and t worked better for patient previously  Vitals:  Vitals:    03/14/19 0700   BP: 96/59   Pulse: 71   Resp: 18   Temp: 97 5 °F (36 4 °C)   SpO2: 99%       Laboratory results:    I have personally reviewed all pertinent laboratory/tests results    Labs in last 72 hours:   Recent Labs     03/14/19  4740 SODIUM 139   K 4 3      CO2 27   BUN 18   CREATININE 0 66   GLUC 80   CALCIUM 9 3   LITHIUM 1 1     Lithium:   Lab Results   Component Value Date    LITHIUM 1 1 03/14/2019       Psychiatric Review of Systems:    Behavior over the last 24 hours:  improved  Sleep: normal  Appetite: normal  Medication side effects: No  ROS: "have something in my throat at night when I go to my room"    Mental Status Evaluation:    Appearance:  disheveled   Behavior:  pleasant, cooperative, calm   Speech:  hypertalkative   Mood:  euthymic   Affect:  brighter, increased in intensity   Thought Process:  racing of thoughts   Associations: circumstantial associations   Thought Content:  some paranoia, preoccupied with have something in my throat at night when I go to my room"   Perceptual Disturbances: none   Risk Potential: Suicidal ideation - None  Homicidal ideation - None  Potential for aggression - No   Sensorium:  oriented to person, place and time/date   Memory:  recent and remote memory grossly intact   Consciousness:  alert and awake   Attention: attention span and concentration are age appropriate   Insight:  improving   Judgment: improving   Gait/Station: normal gait/station except walks with oxygen tank   Motor Activity: no abnormal movements       Progress Toward Goals:     Improving      Assessment/Plan   Principal Problem:    Schizoaffective disorder, bipolar type ([facility] [address] )      Recommended Treatment: Continue with group therapy, milieu therapy and occupational therapy  Treatment plan and medication changes discussed and per the attending physician the plan is: 1  Behavioral Health checks every 7 minutes  2  Continue with current medication regimen  3  Monitor Lithium level in 3-5 days  4  Review BMP in the a m   5  Disposition Planning for [facility] as per Case Management        Risks / Benefits of Treatment:     Risks, benefits, and possible side effects of medications explained to patient and patient verbalizes understanding and agreement for treatment  Counseling / Coordination of Care:     Patient's progress reviewed with nursing staff  Medications, treatment progress and treatment plan reviewed with patient  Supportive counseling provided to the patient            Andrew Pritchett

## 2019-03-14 NOTE — CASE MANAGEMENT
Met with pt this AM to assist with call to JAVIER VAZQUEZ [facility] office [phone]  After 5 minute hold, pt was prompted to give basic info but was transferred to customer service and instructed to leave  for [id number]  Pt left call back information  CM will follow up and support as needed

## 2019-03-14 NOTE — PLAN OF CARE
Pt attended two of three groups today  She continued to need some redirection yet was socially less irritable and more motivated to join peers at the group table

## 2019-03-14 NOTE — PROGRESS NOTES
lithium level 1 1 today   Notified treatment team   Patient feels nauseated , will wait to give lithium  Denies SI/HI  Denies depression at this time

## 2019-03-14 NOTE — PROGRESS NOTES
Pt calm and cooperative  Pt seen in the milieu this evening, seclusive to self  Pt reports mild anxiety and depression  Pt shows no signs of paranoia  Pt is medication compliant

## 2019-03-14 NOTE — CASE MANAGEMENT
CL assisted pt with phone call to JAVIER VAZQUEZ [facility] office [phone]  Pt switched her address to [facility] as recommended by Sakina Mcneal  CM contacted Dwayne to give update on address change

## 2019-03-15 PROCEDURE — 99232 SBSQ HOSP IP/OBS MODERATE 35: CPT | Performed by: NURSE PRACTITIONER

## 2019-03-15 RX ADMIN — THEOPHYLLINE ANHYDROUS 200 MG: 200 CAPSULE, EXTENDED RELEASE ORAL at 09:04

## 2019-03-15 RX ADMIN — QUETIAPINE FUMARATE 450 MG: 50 TABLET, FILM COATED ORAL at 21:40

## 2019-03-15 RX ADMIN — PANTOPRAZOLE SODIUM 20 MG: 20 TABLET, DELAYED RELEASE ORAL at 06:07

## 2019-03-15 RX ADMIN — FLUTICASONE FUROATE AND VILANTEROL TRIFENATATE 1 PUFF: 200; 25 POWDER RESPIRATORY (INHALATION) at 09:06

## 2019-03-15 RX ADMIN — QUETIAPINE 50 MG: 50 TABLET ORAL at 09:05

## 2019-03-15 RX ADMIN — LITHIUM CARBONATE 450 MG: 450 TABLET, EXTENDED RELEASE ORAL at 21:45

## 2019-03-15 RX ADMIN — ALBUTEROL SULFATE 2 PUFF: 90 AEROSOL, METERED RESPIRATORY (INHALATION) at 12:26

## 2019-03-15 RX ADMIN — LEVOTHYROXINE SODIUM 125 MCG: 125 TABLET ORAL at 06:07

## 2019-03-15 RX ADMIN — ALBUTEROL SULFATE 2 PUFF: 90 AEROSOL, METERED RESPIRATORY (INHALATION) at 08:58

## 2019-03-15 RX ADMIN — ALBUTEROL SULFATE 2 PUFF: 90 AEROSOL, METERED RESPIRATORY (INHALATION) at 16:57

## 2019-03-15 RX ADMIN — ALBUTEROL SULFATE 2 PUFF: 90 AEROSOL, METERED RESPIRATORY (INHALATION) at 21:46

## 2019-03-15 RX ADMIN — LITHIUM CARBONATE EXTENDED-RELEASE TABLET 300 MG: 300 TABLET, FILM COATED, EXTENDED RELEASE ORAL at 09:04

## 2019-03-15 NOTE — CASE MANAGEMENT
Call from pt's Norma Crockett, stating that she was at the hospital and will be bringing up pt's belongings and leaving them at the nurse's station off of the elevator  CM made pt's nurse and MHT aware

## 2019-03-15 NOTE — PLAN OF CARE
Problem: Alteration in Thoughts and Perception  Goal: Treatment Goal: Gain control of psychotic behaviors/thinking, reduce/eliminate presenting symptoms and demonstrate improved reality functioning upon discharge  Outcome: Progressing     Problem: Depression  Goal: Treatment Goal: Demonstrate behavioral control of depressive symptoms, verbalize feelings of improved mood/affect, and adopt new coping skills prior to discharge  Outcome: Progressing  Goal: Verbalize thoughts and feelings  Description  Interventions:  - Assess and re-assess patient's level of risk   - Engage patient in 1:1 interactions, daily, for a minimum of 15 minutes   - Encourage patient to express feelings, fears, frustrations, hopes    Outcome: Progressing  Goal: Refrain from harming self  Description  Interventions:  - Monitor patient closely, per order   - Supervise medication ingestion, monitor effects and side effects    Outcome: Progressing  Goal: Refrain from isolation  Description  Interventions:  - Develop a trusting relationship   - Encourage socialization    Outcome: Progressing  Goal: Refrain from self-neglect  Outcome: Progressing  Goal: Attend and participate in unit activities, including therapeutic, recreational, and educational groups  Description  Interventions:  - Provide therapeutic and educational activities daily, encourage attendance and participation, and document same in the medical record    Outcome: Progressing  Goal: Complete daily ADLs, including personal hygiene independently, as able  Description  Interventions:  - Observe, teach, and assist patient with ADLS  -  Monitor and promote a balance of rest/activity, with adequate nutrition and elimination    Outcome: Progressing     Problem: Anxiety  Goal: Anxiety is at manageable level  Description  Interventions:  - Assess and monitor patient's anxiety level     - Monitor for signs and symptoms of anxiety both physical and emotional (heart palpitations, chest pain, shortness of breath, headaches, nausea, feeling jumpy, restlessness, irritable, apprehensive)  - Collaborate with interdisciplinary team and initiate plan and interventions as ordered    - [place] patient to unit/surroundings  - Explain treatment plan  - Encourage participation in care  - Encourage verbalization of concerns/fears  - Identify coping mechanisms  - Assist in developing anxiety-reducing skills  - Administer/offer alternative therapies  - Limit or eliminate stimulants   Outcome: Progressing

## 2019-03-15 NOTE — PROGRESS NOTES
Pt calm and cooperative with care  Pt reports mild anxiety and depression but denies all other symptoms  Pt visible in milieu, social with peers and noted to be in dayroom for dinner  Pt med compliant

## 2019-03-15 NOTE — NURSING NOTE
Patient has been visible in unit, anxious, needy and preoccupied at times with medications and oxygen  Irritable edge but cooperative on approach, compliant with treatment  Remains on 3L O2 via nc, b/l lungs clear on auscultation  Labs, orders and vital signs have been reviewed  On routine checks, will continue to monitor

## 2019-03-15 NOTE — PROGRESS NOTES
Progress Note - Behavioral Health   Chad Meyer 66 y o  female MRN: [id number]  Unit/Bed#: [facility]issa [facility] 745-23 Encounter: [id number]    The patient was seen for continuing care and reviewed with treatment team   Staff reports the patient was rude and difficult yesterday  She had an irritable edge but was a cooperative on approach and medication compliant  Placement is pending  The patient is bright and friendly on approach and states her mood is good today  She says she had a meeting this morning and it went well  She is still concerned about the transportation to the facility if she is discharged next week  She fears that somehow the transport Ellen Console will be intercepted or her family will be able to find her  She reports that she is still having auditory hallucinations of tapes playing but not too much anymore  Also describes them as quieter  Less spontaneous with delusional material today  Eating and sleeping well  No SI  Mental Status Evaluation:  Appearance:  Good eye contact and disheveled   Behavior:  calm, cooperative and friendly   Mood:  euthymic   Affect: appropriate   Speech: Normal rate and Normal volume   Thought Process:  Goal directed and coherent   Thought Content:  Does not verbalize delusional material   Perceptual Disturbances: Auditory hallucinations without commands   Risk Potential: No suicidal or homicidal ideation   Attention/Concentration Age appropriate   Orientation:   Oriented x3   Gait/Station: Not observed   Motor Activity: No abnormal movement noted     Progress Toward Goals: At baseline    Assessment/Plan    Principal Problem:    Schizoaffective disorder, bipolar type ([address] )      Recommended Treatment:     Continue lithium 300 mg q a m  and 450 mg HS  Current level is 1 1  Continue Seroquel 50 mg q a m  and 450 mg HS  Continue with pharmacotherapy, group therapy, milieu therapy and occupational therapy    The patient will be maintained on the following medications:    Current Facility-Administered Medications:  acetaminophen 650 mg Oral Q6H PRN Candy Fridge, CRNP   acetaminophen 650 mg Oral Q4H PRN Candy Fridge, CRNP   acetaminophen 975 mg Oral Q6H PRN Candy Fridge, CRNP   albuterol 2 puff Inhalation 4x Daily Lorena INGRAM Nola, CRNP   benztropine 1 mg Intramuscular BID PRN Candy Fridge, CRNP   benztropine 1 mg Oral BID PRN Candy Fridge, CRNP   calcium carbonate 500 mg Oral Daily PRN Alexy Caro MD   EPINEPHrine PF 0 15 mg Intramuscular PRN Vicky Ciminieri, CRNP   fluticasone-vilanterol 1 puff Inhalation Daily Ryan Bowman MD   gabapentin 100 mg Oral TID PRN Fallon Amel, CRNP   hydrOXYzine HCL 25 mg Oral Q6H PRN Candy Fridge, CRNP   levalbuterol 1 25 mg Nebulization Q8H PRN Gilda Bone MD   Or       sodium chloride 3 mL Nebulization Q8H PRN Gilda Bone MD   levothyroxine 125 mcg Oral Daily Vicky Ciminieri, CRNP   lithium carbonate 300 mg Oral Daily Fallon Amel, CRNP   lithium carbonate 450 mg Oral HS Fallon Amel, CRNP   LORazepam 1 mg Intramuscular Q4H PRN Candy Fridge, CRNP   magnesium hydroxide 30 mL Oral Daily PRN Candy Fridge, CRNP   nicotine 14 mg Transdermal Daily Radha Voss, CRNP   nicotine polacrilex 2 mg Oral Q2H PRN Candy Fridge, CRNP   pantoprazole 20 mg Oral Daily Vicky Ciminifiasal, CRNP   QUEtiapine 450 mg Oral HS Fallon Amel, CRNP   QUEtiapine 50 mg Oral Daily Luh Gustafson MD   theophylline 200 mg Oral Daily Radha Voss, CRNP   traZODone 25 mg Oral HS PRN Candy Fridge, CRNP       Risks, benefits and possible side effects of Medications:   Patient does not verbalize understanding at this time and will require further explanation

## 2019-03-15 NOTE — PROGRESS NOTES
Pt's belongings were checked       Checkbook packet- 4 checkbooks    Olive shirt  2 oxygen trees         White aero shirt  1 oxometer              Orange button up flannel  2 big black chargers  1 nebulizer               2 baby blue gowns  Little green pot with colored pencils        brush  oxygo rasheed                   Orange patterned shirt  1 pk of markers      socks  3 recreational books  2019 calendar             Colorful patterned shirt  Composition notebook  2 oxygen cords  Pink purse           Beige sweater  2 rings               Red demetrio sweater  2 bracelets        blk zip up sweater  Wired notebook  Yellow wallet  oxygo comcentrator  Toothbrush  Sonia Boom sweatpants with strings  Green zip up jacket  White athleta bag  Bag with jewelry  Floral make up bag  Blue fuzzy socks  Jeans  Stripped purple sweater

## 2019-03-15 NOTE — PROGRESS NOTES
Patient says she has severe anxiety and severe depression due to not knowing what this morning's meeting is about  Denies SI/ HI/ hallucinations  Reviewed what times she receives her albuterol treatment per patient's request  Patient wanted her oxygen O2 taken  Result was 97% on 3L continuous

## 2019-03-15 NOTE — PLAN OF CARE
Pt continues to attend scheduled groups yet has begun to sit away from the peer table  She displays less irritability in sessions yet also less contribution to group discussions and tasks

## 2019-03-15 NOTE — CASE MANAGEMENT
CSP meeting this AM with Edda Cheung and Yeison vivar from [facility], and Merlin Pica from [place]  Pt was appropriate and asked questions regarding her safety, medical/psych aftercare, and emergency procedures at the home  The ACT team will be able to start working with pt right away and pt will be set up with their doctors initially and help pt get appts with her preferred doctors moving forward  Adilia Baumann is requesting a copy of the MA-51/DME -- CM will fax to her today  The team is agreeable to pt's transition to [facility] next week  Completed MA-51 and DME form faxed to Adilia Baumann

## 2019-03-15 NOTE — MEDICAL STUDENT
MEDICAL STUDENT  Inpatient H&P for TRAINING ONLY   Not Part of Legal Medical Record       H&P Exam - Violetta Villasenor 66 y o  female MRN: [id number]    Unit/Bed#: [facility] 169-37 Encounter: [id number]      Subjective  Claudy Finch states that today she is feeling good  She had a meeting with her  this morning that she was not aware of until she woke up, and she felt rushed  But she was glad to have the meeting and is happy it is over  The patient notes she slept well and ate well today  She does express she had "GERD" this am upon waking, but was given crackers and it resolved  The patient was approached once this morning after her meeting and was dismissive as she had not eaten breakfast  Upon second approach, the patient is is friendly and approachable   No SI          Current Facility-Administered Medications:     acetaminophen (TYLENOL) tablet 650 mg, 650 mg, Oral, Q6H PRN, Rhianna Roderick, CRNP    acetaminophen (TYLENOL) tablet 650 mg, 650 mg, Oral, Q4H PRN, JOSE CARLOS PetersNP, 650 mg at 01/26/19 1455    acetaminophen (TYLENOL) tablet 975 mg, 975 mg, Oral, Q6H PRN, Rhianna Vaughn, ABILIO    albuterol (PROVENTIL HFA,VENTOLIN HFA) inhaler 2 puff, 2 puff, Inhalation, 4x Daily, ABILIO Valdivia, 2 puff at 03/15/19 0858    benztropine (COGENTIN) injection 1 mg, 1 mg, Intramuscular, BID PRN, ABILIO Peters    benztropine (COGENTIN) tablet 1 mg, 1 mg, Oral, BID PRN, ABILIO Peters    calcium carbonate (TUMS) chewable tablet 500 mg, 500 mg, Oral, Daily PRN, Chanell Walsh MD, 500 mg at 03/03/19 1339    EPINEPHrine PF (ADRENALIN) 1 mg/mL injection 0 15 mg, 0 15 mg, Intramuscular, PRN, ABILIO hC    fluticasone-vilanterol (BREO ELLIPTA) 200-25 MCG/INH inhaler 1 puff, 1 puff, Inhalation, Daily, Pedro Mckeon MD, 1 puff at 03/15/19 0906    gabapentin (NEURONTIN) capsule 100 mg, 100 mg, Oral, TID PRN, ABILIO Smith    hydrOXYzine HCL (ATARAX) tablet 25 mg, 25 mg, Oral, Q6H PRN, Oval Smoke, CRNP    levalbuterol Stevo Bird) inhalation solution 1 25 mg, 1 25 mg, Nebulization, Q8H PRN, 1 25 mg at 03/12/19 1934 **OR** sodium chloride 0 9 % inhalation solution 3 mL, 3 mL, Nebulization, Q8H PRN, Dwayne Rivera MD, 3 mL at 01/19/19 2303    levothyroxine tablet 125 mcg, 125 mcg, Oral, Daily, Vicky Ciminieri, CRNP, 125 mcg at 03/15/19 0607    lithium carbonate (LITHOBID) CR tablet 300 mg, 300 mg, Oral, Daily, Kings Grout, CRNP, 300 mg at 03/15/19 0904    lithium carbonate (LITHOBID) CR tablet 450 mg, 450 mg, Oral, HS, Kings Grout, CRNP, 450 mg at 03/14/19 2221    LORazepam (ATIVAN) 2 mg/mL injection 1 mg, 1 mg, Intramuscular, Q4H PRN, Oval Smoke, CRNP    magnesium hydroxide (MILK OF MAGNESIA) 400 mg/5 mL oral suspension 30 mL, 30 mL, Oral, Daily PRN, Oval Smoke, CRNP    nicotine (NICODERM CQ) 14 mg/24hr TD 24 hr patch 14 mg, 14 mg, Transdermal, Daily, Vicky Ciminieri, CRNP, 14 mg at 03/14/19 0846    nicotine polacrilex (NICORETTE) gum 2 mg, 2 mg, Oral, Q2H PRN, Oval Smoke, CRNP    pantoprazole (PROTONIX) EC tablet 20 mg, 20 mg, Oral, Daily, Vicky Ciminieri, CRNP, 20 mg at 03/15/19 0607    QUEtiapine (SEROquel) tablet 450 mg, 450 mg, Oral, HS, Kings Grout, CRNP, 450 mg at 03/14/19 2201    QUEtiapine (SEROquel) tablet 50 mg, 50 mg, Oral, Daily, Fidelina Hernandez MD, 50 mg at 03/15/19 0905    theophylline (JEF-24) 24 hr capsule 200 mg, 200 mg, Oral, Daily, Vicky Ciminieri, CRNP, 200 mg at 03/15/19 0904    traZODone (DESYREL) tablet 25 mg, 25 mg, Oral, HS PRN, Oval Smoke, CRNP      Objective     Current Vitals:   Blood Pressure: 119/58 (03/15/19 0732)  Pulse: 77 (03/15/19 0732)  Temperature: (!) 96 4 °F (35 8 °C) (03/15/19 0732)  Temp Source: Tympanic (03/15/19 0732)  Respirations: 16 (03/15/19 0732)  Height: 5' 3" (160 cm) (01/15/19 1630)  Weight - Scale: 66 1 kg (145 lb 11 6 oz) (01/15/19 1630)  SpO2: 97 % (03/15/19 0732)    Lab Results Component Value Date/Time    SODIUM 139 03/14/2019 06:32 AM    BUN 18 03/14/2019 06:32 AM    BUN 12 05/18/2015 06:21 AM    CREATININE 0 66 03/14/2019 06:32 AM    CREATININE 0 62 05/18/2015 06:21 AM    EGFR 96 03/14/2019 06:32 AM    LITHIUM 1 1 03/14/2019 06:32 AM       Physical Exam:     Mental Status Exam   Appearance:  Groomed and good eye contact   Consciousness: Alert and oriented    Behavior: Friendly and engaged    Speech: Normal   Mood: Good   Affect: Appropriate, congruent with mood   Thought Process: Normal    Thought Content: No stated hallucinations    Cognition: Intact   Insight: Fair    Judgement: Fair    Motor Activity: Appropriate, no abnormal movement    Risk Potential: No SI      Progress towards goals: Progressing      Assessment/Plan:  Schizoaffective disorder    -continue medications as prescribed    -continue to monitor behavior    -Group therapy    -Plan to d/c to [place] per case management

## 2019-03-16 PROCEDURE — 99232 SBSQ HOSP IP/OBS MODERATE 35: CPT | Performed by: PSYCHIATRY & NEUROLOGY

## 2019-03-16 RX ADMIN — LITHIUM CARBONATE EXTENDED-RELEASE TABLET 300 MG: 300 TABLET, FILM COATED, EXTENDED RELEASE ORAL at 09:14

## 2019-03-16 RX ADMIN — ALBUTEROL SULFATE 2 PUFF: 90 AEROSOL, METERED RESPIRATORY (INHALATION) at 17:50

## 2019-03-16 RX ADMIN — QUETIAPINE FUMARATE 450 MG: 50 TABLET, FILM COATED ORAL at 22:05

## 2019-03-16 RX ADMIN — THEOPHYLLINE ANHYDROUS 200 MG: 200 CAPSULE, EXTENDED RELEASE ORAL at 09:14

## 2019-03-16 RX ADMIN — LITHIUM CARBONATE 450 MG: 450 TABLET, EXTENDED RELEASE ORAL at 22:05

## 2019-03-16 RX ADMIN — LEVOTHYROXINE SODIUM 125 MCG: 125 TABLET ORAL at 06:14

## 2019-03-16 RX ADMIN — ALBUTEROL SULFATE 2 PUFF: 90 AEROSOL, METERED RESPIRATORY (INHALATION) at 21:00

## 2019-03-16 RX ADMIN — PANTOPRAZOLE SODIUM 20 MG: 20 TABLET, DELAYED RELEASE ORAL at 06:14

## 2019-03-16 RX ADMIN — FLUTICASONE FUROATE AND VILANTEROL TRIFENATATE 1 PUFF: 200; 25 POWDER RESPIRATORY (INHALATION) at 09:16

## 2019-03-16 RX ADMIN — ALBUTEROL SULFATE 2 PUFF: 90 AEROSOL, METERED RESPIRATORY (INHALATION) at 09:16

## 2019-03-16 RX ADMIN — ALBUTEROL SULFATE 2 PUFF: 90 AEROSOL, METERED RESPIRATORY (INHALATION) at 13:26

## 2019-03-16 RX ADMIN — QUETIAPINE 50 MG: 50 TABLET ORAL at 09:14

## 2019-03-16 NOTE — NURSING NOTE
Patient has been visible in unit, brighter affect, cooperative and social with select peers this evening  No concerns expressed on assessment, compliant with treatment  Labs, orders and vital signs have been reviewed  On routine checks, will continue to monitor

## 2019-03-16 NOTE — PROGRESS NOTES
Patient states she is anxious which she describes as "standard" and attributes to discharge coming soon  Patient has been irritable and someone bizarre, she had called a staff member, "incompetent" for not remembering the size of the hips of one of her visitors

## 2019-03-16 NOTE — PLAN OF CARE
Problem: Alteration in Thoughts and Perception  Goal: Treatment Goal: Gain control of psychotic behaviors/thinking, reduce/eliminate presenting symptoms and demonstrate improved reality functioning upon discharge  Outcome: Progressing     Problem: Depression  Goal: Treatment Goal: Demonstrate behavioral control of depressive symptoms, verbalize feelings of improved mood/affect, and adopt new coping skills prior to discharge  Outcome: Progressing  Goal: Verbalize thoughts and feelings  Description  Interventions:  - Assess and re-assess patient's level of risk   - Engage patient in 1:1 interactions, daily, for a minimum of 15 minutes   - Encourage patient to express feelings, fears, frustrations, hopes    Outcome: Progressing  Goal: Refrain from harming self  Description  Interventions:  - Monitor patient closely, per order   - Supervise medication ingestion, monitor effects and side effects    Outcome: Progressing  Goal: Refrain from isolation  Description  Interventions:  - Develop a trusting relationship   - Encourage socialization    Outcome: Progressing  Goal: Refrain from self-neglect  Outcome: Progressing  Goal: Complete daily ADLs, including personal hygiene independently, as able  Description  Interventions:  - Observe, teach, and assist patient with ADLS  -  Monitor and promote a balance of rest/activity, with adequate nutrition and elimination    Outcome: Progressing     Problem: Anxiety  Goal: Anxiety is at manageable level  Description  Interventions:  - Assess and monitor patient's anxiety level  - Monitor for signs and symptoms of anxiety both physical and emotional (heart palpitations, chest pain, shortness of breath, headaches, nausea, feeling jumpy, restlessness, irritable, apprehensive)  - Collaborate with interdisciplinary team and initiate plan and interventions as ordered    - [place] patient to unit/surroundings  - Explain treatment plan  - Encourage participation in care  - Encourage verbalization of concerns/fears  - Identify coping mechanisms  - Assist in developing anxiety-reducing skills  - Administer/offer alternative therapies  - Limit or eliminate stimulants   Outcome: Progressing

## 2019-03-16 NOTE — PROGRESS NOTES
Progress Note - Behavioral Health   Ama Turner 66 y o  female MRN: [id number]  Unit/Bed#: [facility] 955-64 Encounter: [id number]    The patient was seen for continuing care and reviewed with treatment team [facility] for different reasons such as too many men there or what if there is a power outage    Mental Status Evaluation:  Appearance:  Adequate hygiene and grooming and Good eye contact   Behavior:  calm and cooperative   Mood:  Anxious   Thought Content:  Does not verbalize delusional material although worried about safety there   Perceptual Disturbances: Denies hallucinations and does not appear to be responding to internal stimuli   Risk Potential: No suicidal or homicidal ideation   Orientation:            Assessment/Plan    Principal Problem:    Schizoaffective disorder, bipolar type ([facility] [address] )      Recommended Treatment: Supportive psychotherapy to alleviate her fears  Continue with pharmacotherapy, group therapy, milieu therapy and occupational therapy    The patient will be maintained on the following medications:    Current Facility-Administered Medications:  acetaminophen 650 mg Oral Q6H PRN Beni Carpen, CRNP   acetaminophen 650 mg Oral Q4H PRN Beni Carpen, CRNP   acetaminophen 975 mg Oral Q6H PRN Beni Carpen, CRNP   albuterol 2 puff Inhalation 4x Daily Lorena NATALY Nola, CRNP   benztropine 1 mg Intramuscular BID PRN Beni Carpen, CRNP   benztropine 1 mg Oral BID PRN Beni Carpen, CRNP   calcium carbonate 500 mg Oral Daily PRN Amy Powell MD   EPINEPHrine PF 0 15 mg Intramuscular PRN Sarai Ano, CRNP   fluticasone-vilanterol 1 puff Inhalation Daily Rafal Malave MD   gabapentin 100 mg Oral TID PRN Estee Rashaad, CRNP   hydrOXYzine HCL 25 mg Oral Q6H PRN Beni Carpen, CRNP   levalbuterol 1 25 mg Nebulization Q8H PRN Ramona Reed MD   Or       sodium chloride 3 mL Nebulization Q8H PRN Ramona Reed MD   levothyroxine 125 mcg Oral Daily Lovell Holy ABILIO Fields   lithium carbonate 300 mg Oral Daily Korey Martin, ABILIO   lithium carbonate 450 mg Oral HS Korey Martin, ABILIO   LORazepam 1 mg Intramuscular Q4H PRN ABILIO Wiseman   magnesium hydroxide 30 mL Oral Daily PRN ABILIO Wiseman   nicotine 14 mg Transdermal Daily Theone Caitlyn, ABILIO   nicotine polacrilex 2 mg Oral Q2H PRN ABILIO Wiseman   pantoprazole 20 mg Oral Daily Vicky Fields, ABILIO   QUEtiapine 450 mg Oral HS Korey Martin, ABILIO   QUEtiapine 50 mg Oral Daily Huong Sawyer MD   theophylline 200 mg Oral Daily ABILIO Nguyen   traZODone 25 mg Oral HS PRN ABILIO Wiseman

## 2019-03-17 PROCEDURE — 99231 SBSQ HOSP IP/OBS SF/LOW 25: CPT | Performed by: PSYCHIATRY & NEUROLOGY

## 2019-03-17 RX ORDER — CLONAZEPAM 0.5 MG/1
0.25 TABLET ORAL 2 TIMES DAILY PRN
Status: DISCONTINUED | OUTPATIENT
Start: 2019-03-17 | End: 2019-03-20 | Stop reason: HOSPADM

## 2019-03-17 RX ADMIN — PANTOPRAZOLE SODIUM 20 MG: 20 TABLET, DELAYED RELEASE ORAL at 06:06

## 2019-03-17 RX ADMIN — ALBUTEROL SULFATE 2 PUFF: 90 AEROSOL, METERED RESPIRATORY (INHALATION) at 08:57

## 2019-03-17 RX ADMIN — THEOPHYLLINE ANHYDROUS 200 MG: 200 CAPSULE, EXTENDED RELEASE ORAL at 08:56

## 2019-03-17 RX ADMIN — QUETIAPINE FUMARATE 450 MG: 50 TABLET, FILM COATED ORAL at 21:55

## 2019-03-17 RX ADMIN — FLUTICASONE FUROATE AND VILANTEROL TRIFENATATE 1 PUFF: 200; 25 POWDER RESPIRATORY (INHALATION) at 08:57

## 2019-03-17 RX ADMIN — LITHIUM CARBONATE EXTENDED-RELEASE TABLET 300 MG: 300 TABLET, FILM COATED, EXTENDED RELEASE ORAL at 08:56

## 2019-03-17 RX ADMIN — ALBUTEROL SULFATE 2 PUFF: 90 AEROSOL, METERED RESPIRATORY (INHALATION) at 18:07

## 2019-03-17 RX ADMIN — CLONAZEPAM 0.25 MG: 0.5 TABLET ORAL at 13:53

## 2019-03-17 RX ADMIN — ALBUTEROL SULFATE 2 PUFF: 90 AEROSOL, METERED RESPIRATORY (INHALATION) at 13:33

## 2019-03-17 RX ADMIN — ALBUTEROL SULFATE 2 PUFF: 90 AEROSOL, METERED RESPIRATORY (INHALATION) at 21:00

## 2019-03-17 RX ADMIN — LEVOTHYROXINE SODIUM 125 MCG: 125 TABLET ORAL at 06:06

## 2019-03-17 RX ADMIN — QUETIAPINE 50 MG: 50 TABLET ORAL at 08:56

## 2019-03-17 RX ADMIN — LITHIUM CARBONATE 450 MG: 450 TABLET, EXTENDED RELEASE ORAL at 21:56

## 2019-03-17 NOTE — PLAN OF CARE
Problem: Alteration in Thoughts and Perception  Goal: Treatment Goal: Gain control of psychotic behaviors/thinking, reduce/eliminate presenting symptoms and demonstrate improved reality functioning upon discharge  Outcome: Progressing     Problem: Depression  Goal: Treatment Goal: Demonstrate behavioral control of depressive symptoms, verbalize feelings of improved mood/affect, and adopt new coping skills prior to discharge  Outcome: Progressing  Goal: Verbalize thoughts and feelings  Description  Interventions:  - Assess and re-assess patient's level of risk   - Engage patient in 1:1 interactions, daily, for a minimum of 15 minutes   - Encourage patient to express feelings, fears, frustrations, hopes    Outcome: Progressing  Goal: Refrain from harming self  Description  Interventions:  - Monitor patient closely, per order   - Supervise medication ingestion, monitor effects and side effects    Outcome: Progressing  Goal: Refrain from isolation  Description  Interventions:  - Develop a trusting relationship   - Encourage socialization    Outcome: Progressing  Goal: Refrain from self-neglect  Outcome: Progressing  Goal: Complete daily ADLs, including personal hygiene independently, as able  Description  Interventions:  - Observe, teach, and assist patient with ADLS  -  Monitor and promote a balance of rest/activity, with adequate nutrition and elimination    Outcome: Progressing     Problem: Anxiety  Goal: Anxiety is at manageable level  Description  Interventions:  - Assess and monitor patient's anxiety level  - Monitor for signs and symptoms of anxiety both physical and emotional (heart palpitations, chest pain, shortness of breath, headaches, nausea, feeling jumpy, restlessness, irritable, apprehensive)  - Collaborate with interdisciplinary team and initiate plan and interventions as ordered    - [place] patient to unit/surroundings  - Explain treatment plan  - Encourage participation in care  - Encourage verbalization of concerns/fears  - Identify coping mechanisms  - Assist in developing anxiety-reducing skills  - Administer/offer alternative therapies  - Limit or eliminate stimulants   Outcome: Progressing

## 2019-03-17 NOTE — PROGRESS NOTES
Patient is anxious and depressed  She has somatic complaints  She was irritable this morning but her affect improved in the afternoon  Patient has been expressing paranoid ideation  Patient is cooperative with care

## 2019-03-17 NOTE — PROGRESS NOTES
Progress Note - Behavioral Health   Eric [place] 66 y o  female MRN: [id number]  Unit/Bed#: Rodriguez Arriaga 066-66 Encounter: [id number]    The patient was seen for continuing care and reviewed with treatment team She is quite anxious and reports that someone by name of Madelaine Boeck follows her  She is also mistrustful of certain staff members    Mental Status Evaluation:  Appearance:  Adequate hygiene and grooming and Good eye contact   Behavior:  calm and cooperative   Mood:  anxious   Thought Content:  Paranoid and mistrustful   Perceptual Disturbances: Denies hallucinations and does not appear to be responding to internal stimuli   Risk Potential: No suicidal or homicidal ideation   Orientation:   A+O x 3         Assessment/Plan    Principal Problem:    Schizoaffective disorder, bipolar type ([facility] [address] )      Recommended Treatment: Clonazepam for anxiety  Continue with pharmacotherapy, group therapy, milieu therapy and occupational therapy    The patient will be maintained on the following medications:    Current Facility-Administered Medications:  acetaminophen 650 mg Oral Q6H PRN Tai Estuardo, CRNP   acetaminophen 650 mg Oral Q4H PRN Tai Jack, CRNP   acetaminophen 975 mg Oral Q6H PRN Tai Jack, CRNP   albuterol 2 puff Inhalation 4x Daily Lorena NATALY Nola, CRNP   benztropine 1 mg Intramuscular BID PRN Tai Jack, CRNP   benztropine 1 mg Oral BID PRN Tai Jack, CRNP   calcium carbonate 500 mg Oral Daily PRN Santiago Sanchez MD   EPINEPHrine PF 0 15 mg Intramuscular PRN Jah Wong, CRNP   fluticasone-vilanterol 1 puff Inhalation Daily Jelani Belcher MD   gabapentin 100 mg Oral TID PRN Christin Berman, CRNP   hydrOXYzine HCL 25 mg Oral Q6H PRN Tai Estuardo, CRNP   levalbuterol 1 25 mg Nebulization Q8H PRN Meena Schuler MD   Or       sodium chloride 3 mL Nebulization Q8H PRN Meena Schuler MD   levothyroxine 125 mcg Oral Daily Vicky Fields, CRNP   lithium carbonate 300 mg Oral Daily Latesha Rubin ABILIO [place]   lithium carbonate 450 mg Oral HS Sharonda Bettencourt, ABILIO   LORazepam 1 mg Intramuscular Q4H PRN ABILIO Kaur   magnesium hydroxide 30 mL Oral Daily PRN ABILIO Kaur   nicotine 14 mg Transdermal Daily ABILIO Ramachandran   nicotine polacrilex 2 mg Oral Q2H PRN ABILIO Kaur   pantoprazole 20 mg Oral Daily ABILIO Nguyen   QUEtiapine 450 mg Oral HS Sharonda [place]court, ABILIO   QUEtiapine 50 mg Oral Daily Evgeny Sahu MD   theophylline 200 mg Oral Daily ABILIO Ramachandran   traZODone 25 mg Oral HS PRN ABILIO Kaur

## 2019-03-17 NOTE — PROGRESS NOTES
Pt cooperative and compliant  Sits out in dayroom all evening  Talks about her upcoming discharge frequently  Expresses some anxiety over it  Medication compliant

## 2019-03-18 PROCEDURE — 99232 SBSQ HOSP IP/OBS MODERATE 35: CPT | Performed by: NURSE PRACTITIONER

## 2019-03-18 RX ORDER — QUETIAPINE FUMARATE 50 MG/1
50 TABLET, FILM COATED ORAL DAILY
Qty: 21 TABLET | Refills: 0 | Status: SHIPPED | OUTPATIENT
Start: 2019-03-19 | End: 2019-04-07

## 2019-03-18 RX ORDER — QUETIAPINE FUMARATE 50 MG/1
450 TABLET, FILM COATED ORAL
Qty: 189 TABLET | Refills: 0 | Status: SHIPPED | OUTPATIENT
Start: 2019-03-18 | End: 2019-04-07

## 2019-03-18 RX ORDER — LITHIUM CARBONATE 450 MG
450 TABLET, EXTENDED RELEASE ORAL
Qty: 21 TABLET | Refills: 0 | Status: SHIPPED | OUTPATIENT
Start: 2019-03-18 | End: 2019-04-07

## 2019-03-18 RX ORDER — LITHIUM CARBONATE 300 MG/1
300 TABLET, FILM COATED, EXTENDED RELEASE ORAL DAILY
Qty: 21 TABLET | Refills: 0 | Status: SHIPPED | OUTPATIENT
Start: 2019-03-19 | End: 2019-04-07

## 2019-03-18 RX ADMIN — ALBUTEROL SULFATE 2 PUFF: 90 AEROSOL, METERED RESPIRATORY (INHALATION) at 21:04

## 2019-03-18 RX ADMIN — FLUTICASONE FUROATE AND VILANTEROL TRIFENATATE 1 PUFF: 200; 25 POWDER RESPIRATORY (INHALATION) at 08:47

## 2019-03-18 RX ADMIN — ALBUTEROL SULFATE 2 PUFF: 90 AEROSOL, METERED RESPIRATORY (INHALATION) at 08:48

## 2019-03-18 RX ADMIN — THEOPHYLLINE ANHYDROUS 200 MG: 200 CAPSULE, EXTENDED RELEASE ORAL at 08:48

## 2019-03-18 RX ADMIN — ALBUTEROL SULFATE 2 PUFF: 90 AEROSOL, METERED RESPIRATORY (INHALATION) at 12:50

## 2019-03-18 RX ADMIN — LITHIUM CARBONATE EXTENDED-RELEASE TABLET 300 MG: 300 TABLET, FILM COATED, EXTENDED RELEASE ORAL at 08:48

## 2019-03-18 RX ADMIN — ALBUTEROL SULFATE 2 PUFF: 90 AEROSOL, METERED RESPIRATORY (INHALATION) at 16:59

## 2019-03-18 RX ADMIN — PANTOPRAZOLE SODIUM 20 MG: 20 TABLET, DELAYED RELEASE ORAL at 06:23

## 2019-03-18 RX ADMIN — LITHIUM CARBONATE 450 MG: 450 TABLET, EXTENDED RELEASE ORAL at 22:04

## 2019-03-18 RX ADMIN — LEVOTHYROXINE SODIUM 125 MCG: 125 TABLET ORAL at 06:23

## 2019-03-18 RX ADMIN — QUETIAPINE 50 MG: 50 TABLET ORAL at 08:48

## 2019-03-18 RX ADMIN — QUETIAPINE FUMARATE 450 MG: 50 TABLET, FILM COATED ORAL at 22:03

## 2019-03-18 NOTE — CASE MANAGEMENT
Email from [facility] requesting the following:    -Copy of Patsys ID and SS card     -Any diagnostic test results you may have that relate to her current medical status -Rep-payee with [employer] since he seems to have the walker word with her currently?    -D/c scripts please be sent directly to [facility] the day before discharge (fax: [phone])?      CM faxed copy of SS, ID and lab results this AM  CM will speak with pt about rep-payee services  The team will wait to hear from Baldemar Manzo @ [facility] to determine definite d/c day this week   We're targeting Wed or Thurs at this time   [facility], [facility] Team will reach out to [facility], pt's previous [address], to arrange for pt's O2 supply

## 2019-03-18 NOTE — CASE MANAGEMENT
Scripts faxed to [facility] [phone], Jennifer Li at [facility] [phone], and [facility] [phone]

## 2019-03-18 NOTE — CASE MANAGEMENT
Met with pt to call [id number] [address] for [place] Medicaid -- [place] will [place] done today to switch her MA into [facility]     Email from [employer], [facility]  (Team A) stating that they can transition pt to [address] on Wednesday at 11am

## 2019-03-18 NOTE — MEDICAL STUDENT
MEDICAL STUDENT  Inpatient Progress Note for TRAINING ONLY  Not Part of Legal Medical Record       Progress Note - Jose F Mahoney 66 y o  female MRN: [id number]    Unit/Bed#: Eloina Swift 974-17 Encounter: [id number]      Assessment:  Patient was dismissive this a m  Stated that she was only talking to a peer today  Stated she was eating  Sleeps during the night  Continues to worry about O2 levels in her tank  Pt was informed by staff that she had to keep the tank until in was in the red, and she could no longer request a new tank while the amount read in the green  Plan:  Continue current medications and plan for discharge this week  Subjective:   Pt continues to state that she needs 24-hour oxygen  She did not say much about how she is feeling or what is going on in her mind  Objective:     Vitals: Blood pressure 107/65, pulse 82, temperature 97 9 °F (36 6 °C), temperature source Tympanic, resp  rate 18, height 5' 3" (1 6 m), weight 66 1 kg (145 lb 11 6 oz), SpO2 100 %  ,Body mass index is 25 81 kg/m²  No intake or output data in the 24 hours ending 03/18/19 1145    Physical Exam: No exam performed today       Invasive Devices          None

## 2019-03-18 NOTE — PROGRESS NOTES
Pt social and visible in dayroom  C/o anxiety related to pending discharge  C/o racing heartbeat and thinks it may be caused by her inhaler  irritable at times  Medication compliant

## 2019-03-18 NOTE — PROGRESS NOTES
Progress Note - Behavioral Health   Dillon Butt 66 y o  female MRN: [id number]  Unit/Bed#: Kinza Andrew 995-96 Encounter: [id number]    The patient was seen for continuing care and reviewed with treatment team   Staff reports the patient remains somatic and anxious at times  She refused her lithium level this morning  Patient is irritable on approach stating that she only wants to talk to her peer today  However she would answer several questions including sleep and appetite which were both fine  Continues to worry about her oxygen and running out of it  Is upset that staff will not replace her oxygen tank before goes into the red zone  Did not spontaneously voice delusional material at this time  Mental Status Evaluation:  Appearance:  Good eye contact and disheveled   Behavior:  evasive   Mood:  anxious   Affect: appropriate   Speech: Normal rate and Normal volume   Thought Process:  Goal directed and coherent   Thought Content:  Paranoid and mistrustful   Perceptual Disturbances: Uncertain   Risk Potential: No suicidal or homicidal ideation   Attention/Concentration Jesse than expected   Orientation:   Oriented x3   Gait/Station: Not observed   Motor Activity: No abnormal movement noted     Progress Toward Goals:  No change- at baseline    Assessment/Plan    Principal Problem:    Schizoaffective disorder, bipolar type (HCC)      Recommended Treatment:     Continue lithium 300 mg q a m  and 450 mg q h s     We will check a level tomorrow as patient refused this morning  Continue Seroquel 50 mg daily and 450 mg HS  Continue with pharmacotherapy, group therapy, milieu therapy and occupational therapy    The patient will be maintained on the following medications:    Current Facility-Administered Medications:  acetaminophen 650 mg Oral Q6H PRN Marsa Crews, CRNP   acetaminophen 650 mg Oral Q4H PRN Marsa Crews, CRNP   acetaminophen 975 mg Oral Q6H PRN Marsa Crews, CRNP   albuterol 2 puff Inhalation 4x Daily Lorena Vaca, CRNP   benztropine 1 mg Intramuscular BID PRN Marcus Mode, CRNP   benztropine 1 mg Oral BID PRN Marcus Mode, CRNP   calcium carbonate 500 mg Oral Daily PRN Ike Boston MD   clonazePAM 0 25 mg Oral BID PRN Sukh James MD   EPINEPHrine PF 0 15 mg Intramuscular PRN Yesenia Edin, CRNP   fluticasone-vilanterol 1 puff Inhalation Daily Nando Coleman MD   gabapentin 100 mg Oral TID PRN Deborrah Linear, CRNP   hydrOXYzine HCL 25 mg Oral Q6H PRN Marcus Mode, CRNP   levalbuterol 1 25 mg Nebulization Q8H PRN Sukh James MD   Or       sodium chloride 3 mL Nebulization Q8H PRN Sukh James MD   levothyroxine 125 mcg Oral Daily Vicky Ciminieri, CRNP   lithium carbonate 300 mg Oral Daily Deborrah Linear, CRNP   lithium carbonate 450 mg Oral HS Deborrah Linear, CRNP   LORazepam 1 mg Intramuscular Q4H PRN Marcus Mode, CRNP   magnesium hydroxide 30 mL Oral Daily PRN Marcus Mode, CRNP   nicotine 14 mg Transdermal Daily Yesenia Edin, CRNP   nicotine polacrilex 2 mg Oral Q2H PRN Marcus Mode, CRNP   pantoprazole 20 mg Oral Daily Vicky Ciminieri, CRNP   QUEtiapine 450 mg Oral HS Deborrah Linear, CRNP   QUEtiapine 50 mg Oral Daily López Hughes MD   theophylline 200 mg Oral Daily Vicky Ciminieri, CRNP   traZODone 25 mg Oral HS PRN Marcus Mode, CRNP       Risks, benefits and possible side effects of Medications:   Patient does not verbalize understanding at this time and will require further explanation

## 2019-03-18 NOTE — PROGRESS NOTES
Pt was calm and cooperative with care  Pt was medication compliant, except for the Nicotine patch  Pt refused AM labs this morning  Pt reported anxiety related to somatic complaints overnight, of breathing difficulty  Pt was out in the milieu, but had limited interaction with her peers  Will continue to monitor

## 2019-03-18 NOTE — PLAN OF CARE
Problem: Alteration in Thoughts and Perception  Goal: Treatment Goal: Gain control of psychotic behaviors/thinking, reduce/eliminate presenting symptoms and demonstrate improved reality functioning upon discharge  Outcome: Progressing     Problem: Depression  Goal: Treatment Goal: Demonstrate behavioral control of depressive symptoms, verbalize feelings of improved mood/affect, and adopt new coping skills prior to discharge  Outcome: Progressing  Goal: Verbalize thoughts and feelings  Description  Interventions:  - Assess and re-assess patient's level of risk   - Engage patient in 1:1 interactions, daily, for a minimum of 15 minutes   - Encourage patient to express feelings, fears, frustrations, hopes    Outcome: Progressing  Goal: Refrain from harming self  Description  Interventions:  - Monitor patient closely, per order   - Supervise medication ingestion, monitor effects and side effects    Outcome: Progressing  Goal: Refrain from isolation  Description  Interventions:  - Develop a trusting relationship   - Encourage socialization    Outcome: Progressing  Goal: Refrain from self-neglect  Outcome: Progressing  Goal: Attend and participate in unit activities, including therapeutic, recreational, and educational groups  Description  Interventions:  - Provide therapeutic and educational activities daily, encourage attendance and participation, and document same in the medical record    Outcome: Progressing  Goal: Complete daily ADLs, including personal hygiene independently, as able  Description  Interventions:  - Observe, teach, and assist patient with ADLS  -  Monitor and promote a balance of rest/activity, with adequate nutrition and elimination    Outcome: Progressing     Problem: Anxiety  Goal: Anxiety is at manageable level  Description  Interventions:  - Assess and monitor patient's anxiety level     - Monitor for signs and symptoms of anxiety both physical and emotional (heart palpitations, chest pain, shortness of breath, headaches, nausea, feeling jumpy, restlessness, irritable, apprehensive)  - Collaborate with interdisciplinary team and initiate plan and interventions as ordered    - [place] patient to unit/surroundings  - Explain treatment plan  - Encourage participation in care  - Encourage verbalization of concerns/fears  - Identify coping mechanisms  - Assist in developing anxiety-reducing skills  - Administer/offer alternative therapies  - Limit or eliminate stimulants   Outcome: Progressing

## 2019-03-18 NOTE — PLAN OF CARE
Pt  Attended community meeting displaying more social irritability  She discussed her uneasiness with MHT's collecting blood draws,in spite of assurance taha the staff is well trained in their duties

## 2019-03-19 PROCEDURE — 99232 SBSQ HOSP IP/OBS MODERATE 35: CPT | Performed by: NURSE PRACTITIONER

## 2019-03-19 RX ADMIN — QUETIAPINE FUMARATE 450 MG: 50 TABLET, FILM COATED ORAL at 21:55

## 2019-03-19 RX ADMIN — PANTOPRAZOLE SODIUM 20 MG: 20 TABLET, DELAYED RELEASE ORAL at 06:11

## 2019-03-19 RX ADMIN — ALBUTEROL SULFATE 2 PUFF: 90 AEROSOL, METERED RESPIRATORY (INHALATION) at 22:02

## 2019-03-19 RX ADMIN — NICOTINE 14 MG: 14 PATCH, EXTENDED RELEASE TRANSDERMAL at 08:49

## 2019-03-19 RX ADMIN — LITHIUM CARBONATE EXTENDED-RELEASE TABLET 300 MG: 300 TABLET, FILM COATED, EXTENDED RELEASE ORAL at 08:48

## 2019-03-19 RX ADMIN — ALBUTEROL SULFATE 2 PUFF: 90 AEROSOL, METERED RESPIRATORY (INHALATION) at 14:00

## 2019-03-19 RX ADMIN — QUETIAPINE 50 MG: 50 TABLET ORAL at 08:49

## 2019-03-19 RX ADMIN — ALBUTEROL SULFATE 2 PUFF: 90 AEROSOL, METERED RESPIRATORY (INHALATION) at 08:49

## 2019-03-19 RX ADMIN — LEVOTHYROXINE SODIUM 125 MCG: 125 TABLET ORAL at 05:42

## 2019-03-19 RX ADMIN — THEOPHYLLINE ANHYDROUS 200 MG: 200 CAPSULE, EXTENDED RELEASE ORAL at 08:48

## 2019-03-19 RX ADMIN — LITHIUM CARBONATE 450 MG: 450 TABLET, EXTENDED RELEASE ORAL at 21:55

## 2019-03-19 RX ADMIN — ALBUTEROL SULFATE 2 PUFF: 90 AEROSOL, METERED RESPIRATORY (INHALATION) at 18:03

## 2019-03-19 RX ADMIN — FLUTICASONE FUROATE AND VILANTEROL TRIFENATATE 1 PUFF: 200; 25 POWDER RESPIRATORY (INHALATION) at 08:49

## 2019-03-19 NOTE — PROGRESS NOTES
Pt is calm and cooperative  Pt eagerly anticipating discharge  Pt reports mild anxiety due to environment change, and depression, denies remaining s/s  Pt is medication compliant

## 2019-03-19 NOTE — CASE MANAGEMENT
Spoke with pt regarding d/c tomorrow  Reassured her that o2 supply has been sent to [place] and is ready for her arrival  Also reassured pt that her [facility] team will be able to support her with her continued OP care/tx as well as help her get settled when she gets to [facility]  Pt is declining rep-payee services at this time

## 2019-03-19 NOTE — PROGRESS NOTES
Progress Note - Behavioral Health   Ali Fullalbina 66 y o  female MRN: [id number]  Unit/Bed#: Yoav Yadav 247-00 Encounter: [id number]    The patient was seen for continuing care and reviewed with treatment team  Patient present in the milieu with oxygen on, and though slightly guarded did meet with this writer  Patient has a bright affect and states "I'm anxious about tomorrow   but I know I am ready to to go home"  Patient also states that she becomes nervous when her oxygen tank is not full, though was reassured that a new one will be provided by nursing staff  Though apprehensive about talking in the community area, patient states she is not depressed at this time and patient denies hallucinations of any kind  Mental Status Evaluation:  Appearance:  Older than stated age; good eye contact   Behavior:  calm, cooperative and guarded   Fund of knowledge  aware of current events   Speech:   Language: Normal rate and Normal volume  No overt abnormality   Mood:  anxious   Affect:   Associations: appropriate and broad  Tightly connected   Thought Process:  Goal directed and coherent   Thought Content:  Does not verbalize delusional material   Perceptual Disturbances: Denies hallucinations and does not appear to be responding to internal stimuli   Risk Potential: No suicidal or homicidal ideation   Orientation  Oriented x 3   Memory Not tested   Attention/Concentration attention span and concentration were age appropriate   Insight:  Good insight   Judgment: Did not assess   Gait/Station: Not observed   Motor Activity: No abnormal movement noted     Progress Toward Goals: Patient acknowledges discharge plan for tomorrow and can verbalize her feelings towards it while recognizing that she is overall ready to leave the hospital setting  Patient continues to take scheduled medications as prescribed      Assessment/Plan    Principal Problem:    Schizoaffective disorder, bipolar type ([facility] [address] )      Recommended Treatment: Continue with pharmacotherapy, group therapy, milieu therapy and occupational therapy  The patient will be maintained on the following medications:    Current Facility-Administered Medications:  acetaminophen 650 mg Oral Q6H PRN Rhianna Roderick, CRNP   acetaminophen 650 mg Oral Q4H PRN Rhianna Roderick, CRNP   acetaminophen 975 mg Oral Q6H PRN Rhianna [place], CRNP   albuterol 2 puff Inhalation 4x Daily Lorena Vaca, CRNP   benztropine 1 mg Intramuscular BID PRN Rhianna Roderick, CRNP   benztropine 1 mg Oral BID PRN Rhianna [place], CRNP   calcium carbonate 500 mg Oral Daily PRN Chanell Walsh MD   clonazePAM 0 25 mg Oral BID PRN Regina Schmitz MD   EPINEPHrine PF 0 15 mg Intramuscular PRN Ghislaine , CRNP   fluticasone-vilanterol 1 puff Inhalation Daily Pedro Mckeon MD   gabapentin 100 mg Oral TID PRN Lisa Ana, CRNP   hydrOXYzine HCL 25 mg Oral Q6H PRN [facility], CRNP   levalbuterol 1 25 mg Nebulization Q8H PRN Regina Schmitz MD   Or       sodium chloride 3 mL Nebulization Q8H PRN Regina Schmitz MD   levothyroxine 125 mcg Oral Daily Vicky Ciminieri, CRNP   lithium carbonate 300 mg Oral Daily Lisa Ana, CRNP   lithium carbonate 450 mg Oral HS Lisa Ana, CRNP   LORazepam 1 mg Intramuscular Q4H PRN [facility] [place], CRNP   magnesium hydroxide 30 mL Oral Daily PRN [facility], CRNP   nicotine 14 mg Transdermal Daily Ghislaine , CRNP   nicotine polacrilex 2 mg Oral Q2H PRN Rhianna [place], CRNP   pantoprazole 20 mg Oral Daily Vicky Ciminieri, CRNP   QUEtiapine 450 mg Oral HS Lisa Ana, CRNP   QUEtiapine 50 mg Oral Daily Tamra Dong MD   theophylline 200 mg Oral Daily Ghislaine , CRNP   traZODone 25 mg Oral HS PRN [facility], CRNP       Risks, benefits and possible side effects of Medications:   Risks, benefits, and possible side effects of medications explained to patient and patient verbalizes understanding

## 2019-03-19 NOTE — PROGRESS NOTES
Pt called staff into room multiple times with several different somatic complaints including possible tonic clonic seizure activity and difficulty breathing  Pt noted to have normal respirations no SOB or distress noted  Called respiratory for pt and had Klonopin to give pt  She refused both and said she just wanted to try sleeping  Will monitor her progress

## 2019-03-19 NOTE — CASE MANAGEMENT
Confirmed with Disha Edwards @[facility]/[place] that everything is ready for pt's transition tomorrow  Kailash Penn @ [facility] will  pt at 11am and bring her to their office to meet with Dr Otto Duty  Afterwards, Ynes will bring pt to [facility]  Ynes is requesting a packed lunch for pt since she'll be d/c'ed before lunchtime   CM will follow up to see if dietary can meet this request

## 2019-03-19 NOTE — PROGRESS NOTES
Pt was visible in a day room, was pleasant, cooperative, denied SI, pain  Was complaining feeling tired for the last 4 days sleeping problems and difficulties breathing at night  Pt was recommended to do breathing exercises and keeping O2 on during the night  Will be monitored for safety

## 2019-03-19 NOTE — PLAN OF CARE
Pt continues to join groups yet is retreating back to the sidelines in sessions  She is aware that discharge is pending and reports looking forward to leaving the hospital

## 2019-03-19 NOTE — CASE MANAGEMENT
Briefly spoke with pt to go over d/c plan for tomorrow   CM will follow up again this morning to address pt's questions

## 2019-03-19 NOTE — PROGRESS NOTES
Pt compliant with treatment and medication regime  Pt present in the milieu and attended group  Pt denies depression reports mild anxiety  Pt denies SI and HI  Will continue to monitor

## 2019-03-20 VITALS
TEMPERATURE: 97.6 F | BODY MASS INDEX: 25.82 KG/M2 | HEART RATE: 60 BPM | SYSTOLIC BLOOD PRESSURE: 111 MMHG | WEIGHT: 145.72 LBS | HEIGHT: 63 IN | OXYGEN SATURATION: 95 % | DIASTOLIC BLOOD PRESSURE: 59 MMHG | RESPIRATION RATE: 18 BRPM

## 2019-03-20 PROCEDURE — 99239 HOSP IP/OBS DSCHRG MGMT >30: CPT | Performed by: NURSE PRACTITIONER

## 2019-03-20 RX ORDER — CLONAZEPAM 1 MG/1
1 TABLET ORAL ONCE
Status: COMPLETED | OUTPATIENT
Start: 2019-03-20 | End: 2019-03-20

## 2019-03-20 RX ADMIN — CLONAZEPAM 1 MG: 1 TABLET ORAL at 10:46

## 2019-03-20 RX ADMIN — QUETIAPINE 50 MG: 50 TABLET ORAL at 08:43

## 2019-03-20 RX ADMIN — LITHIUM CARBONATE EXTENDED-RELEASE TABLET 300 MG: 300 TABLET, FILM COATED, EXTENDED RELEASE ORAL at 08:43

## 2019-03-20 RX ADMIN — ALBUTEROL SULFATE 2 PUFF: 90 AEROSOL, METERED RESPIRATORY (INHALATION) at 08:43

## 2019-03-20 RX ADMIN — THEOPHYLLINE ANHYDROUS 200 MG: 200 CAPSULE, EXTENDED RELEASE ORAL at 08:43

## 2019-03-20 RX ADMIN — FLUTICASONE FUROATE AND VILANTEROL TRIFENATATE 1 PUFF: 200; 25 POWDER RESPIRATORY (INHALATION) at 08:43

## 2019-03-20 RX ADMIN — PANTOPRAZOLE SODIUM 20 MG: 20 TABLET, DELAYED RELEASE ORAL at 06:13

## 2019-03-20 RX ADMIN — LEVOTHYROXINE SODIUM 125 MCG: 125 TABLET ORAL at 06:13

## 2019-03-20 NOTE — DISCHARGE SUMMARY
Discharge Summary - Aspen Crawford [age] y o  female MRN: [id number]  Unit/Bed#: [facility] 591-46 Encounter: [id number]     Admission Date: 1/15/2019         Discharge Date: 03/20/2019    Attending Psychiatrist: Vangie Earl MD    Reason for Admission/HPI:  The patient is a 72-year-old   female with a history of schizoaffective disorder who presented on a voluntary 201 commitment basis with increased persecutory delusions, paranoia, auditory hallucinations, neurovegetative signs of depression including sleep and appetite disturbances, change in energy level and anhedonia        Meds/Allergies     current meds:   Current Facility-Administered Medications   Medication Dose Route Frequency    acetaminophen (TYLENOL) tablet 650 mg  650 mg Oral Q6H PRN    acetaminophen (TYLENOL) tablet 650 mg  650 mg Oral Q4H PRN    acetaminophen (TYLENOL) tablet 975 mg  975 mg Oral Q6H PRN    albuterol (PROVENTIL HFA,VENTOLIN HFA) inhaler 2 puff  2 puff Inhalation 4x Daily    benztropine (COGENTIN) injection 1 mg  1 mg Intramuscular BID PRN    benztropine (COGENTIN) tablet 1 mg  1 mg Oral BID PRN    calcium carbonate (TUMS) chewable tablet 500 mg  500 mg Oral Daily PRN    clonazePAM (KlonoPIN) tablet 0 25 mg  0 25 mg Oral BID PRN    EPINEPHrine PF (ADRENALIN) 1 mg/mL injection 0 15 mg  0 15 mg Intramuscular PRN    fluticasone-vilanterol (BREO ELLIPTA) 200-25 MCG/INH inhaler 1 puff  1 puff Inhalation Daily    gabapentin (NEURONTIN) capsule 100 mg  100 mg Oral TID PRN    hydrOXYzine HCL (ATARAX) tablet 25 mg  25 mg Oral Q6H PRN    levalbuterol (XOPENEX) inhalation solution 1 25 mg  1 25 mg Nebulization Q8H PRN    Or    sodium chloride 0 9 % inhalation solution 3 mL  3 mL Nebulization Q8H PRN    levothyroxine tablet 125 mcg  125 mcg Oral Daily    lithium carbonate (LITHOBID) CR tablet 300 mg  300 mg Oral Daily    lithium carbonate (LITHOBID) CR tablet 450 mg  450 mg Oral HS    LORazepam (ATIVAN) 2 mg/mL injection 1 mg  1 mg Intramuscular Q4H PRN    magnesium hydroxide (MILK OF MAGNESIA) 400 mg/5 mL oral suspension 30 mL  30 mL Oral Daily PRN    nicotine (NICODERM CQ) 14 mg/24hr TD 24 hr patch 14 mg  14 mg Transdermal Daily    nicotine polacrilex (NICORETTE) gum 2 mg  2 mg Oral Q2H PRN    pantoprazole (PROTONIX) EC tablet 20 mg  20 mg Oral Daily    QUEtiapine (SEROquel) tablet 450 mg  450 mg Oral HS    QUEtiapine (SEROquel) tablet 50 mg  50 mg Oral Daily    theophylline (JEF-24) 24 hr capsule 200 mg  200 mg Oral Daily    traZODone (DESYREL) tablet 25 mg  25 mg Oral HS PRN       Allergies   Allergen Reactions    Peanut-Containing Drug Products Anaphylaxis    Shellfish-Derived Products Anaphylaxis    Antihistamines, Chlorpheniramine-Type     Aspirin     Atrovent [Ipratropium]     Elavil [Amitriptyline]     Levaquin [Levofloxacin]     Novocain [Procaine]     Penicillins     Prolixin [Fluphenazine]        Objective     Vital signs in last 24 hours:  Temp:  [97 6 °F (36 4 °C)-98 6 °F (37 °C)] 97 6 °F (36 4 °C)  HR:  [60-82] 60  Resp:  [18-22] 18  BP: (110-127)/(59-77) 111/59    No intake or output data in the 24 hours ending 03/20/19 [address] [place] [address] Course: The patient was admitted to the inpatient psychiatric unit and started on every 15 minutes precautions  During the hospitalization the patient was attending individual therapy, group therapy, milieu therapy and occupational therapy  Psychiatric medications were titrated over the hospital stay  To address depressive symptoms, mood instability, mood swings, delusional thoughts and auditory hallucinations the patient was started on mood stabilizer Lithium and antipsychotic medication Seroquel  Medication doses were titrated during the hospital course   Prior to beginning of treatment medications risks and benefits and possible side effects including risk of kidney impairment related to treatment with Lithium, risk of parkinsonian symptoms, Tardive Dyskinesia and metabolic syndrome related to treatment with antipsychotic medications and risk of cardiovascular events in elderly related to treatment with antipsychotic medications were reviewed with the patient  The patient verbalized understanding and agreement for treatment  Upon admission patient was extremely paranoid, grandiose, labile, depressed and anxious  She was extremely tangential in conversation and minimally cooperative  Patient's symptoms improved gradually over the hospital course  At the end of treatment the patient was doing well  By the end of her admission the patient was less spontaneous with delusional material   She was easier to redirect in conversation  She was more appropriate in the milieu  She was participating in structured groups and was social and visible  She was no longer depressed or anxious except for she did have some increased anxiety related to discharge and the change in her environment  Mood was stable at the time of discharge  The patient denied suicidal ideation, intent or plan at the time of discharge and denied homicidal ideation, intent or plan at the time of discharge  There was no overt psychosis at the time of discharge  Sleep and appetite were improved  The patient was tolerating medications and was not reporting any significant side effects at the time of discharge  Since the patient was doing well at the end of the hospitalization, treatment team felt that the patient could be safely discharged to outpatient care  Prior to discharge the patient was set up with [facility] team services to provide her with support in the community  Placement was found in a supportive group living situation  The outpatient follow up was arranged by the unit  upon discharge      Mental Status at Time of Discharge:   Appearance:  Good eye contact and disheveled   Behavior:  cooperative and friendly   Speech:   Language: Normal rate and Normal volume  No overt abnormality   Mood:  anxious   Affect:   Associations: appropriate and broad  Tightly connected   Thought Process:  Goal directed and coherent   Thought Content:  Residual delusions however less spontaneous in voicing them   Perceptual Disturbances: Denies hallucinations and does not appear to be responding to internal stimuli     Risk Potential: No suicidal or homicidal ideation   Orientation   Language Oriented x 3     Memory  Fund of knowledge grossly intact  aware of current events   Attention/Concentration Age appropriate   Insight:  Good insight   Judgment: Good judgment   Gait/Station: Not observed   Motor Activity: No abnormal movement noted       Admission Diagnosis:  Principal Problem:    Schizoaffective disorder, bipolar type [facility])      Discharge Diagnosis:     Principal Problem:    Schizoaffective disorder, bipolar type ([facility] [address] )  Resolved Problems:    * No resolved hospital problems   *      Lab results:    Admission on 01/15/2019   Component Date Value    Cholesterol 01/17/2019 212*    Triglycerides 01/17/2019 112     HDL, Direct 01/17/2019 56     LDL Calculated 01/17/2019 134*    Non-HDL-Chol (CHOL-HDL) 01/17/2019 156     RPR 01/17/2019 Non-Reactive     Sodium 01/17/2019 139     Potassium 01/17/2019 4 4     Chloride 01/17/2019 105     CO2 01/17/2019 28     ANION GAP 01/17/2019 6     BUN 01/17/2019 12     Creatinine 01/17/2019 0 70     Glucose 01/17/2019 124*    Calcium 01/17/2019 9 4     AST 01/17/2019 17     ALT 01/17/2019 21     Alkaline Phosphatase 01/17/2019 89     Total Protein 01/17/2019 6 8     Albumin 01/17/2019 3 7     Total Bilirubin 01/17/2019 0 30     eGFR 01/17/2019 94     WBC 01/17/2019 9 50     RBC 01/17/2019 4 33     Hemoglobin 01/17/2019 13 4     Hematocrit 01/17/2019 41 9     MCV 01/17/2019 97     MCH 01/17/2019 31 0     MCHC 01/17/2019 32 0     RDW 01/17/2019 13 5     MPV 01/17/2019 10 4     Platelets 16/27/3908 204     Neutrophils Relative 01/17/2019 82*    Lymphocytes Relative 01/17/2019 12*    Monocytes Relative 01/17/2019 5     Eosinophils Relative 01/17/2019 1     Basophils Relative 01/17/2019 1     Neutrophils Absolute 01/17/2019 7 80     Lymphocytes Absolute 01/17/2019 1 20     Monocytes Absolute 01/17/2019 0 40     Eosinophils Absolute 01/17/2019 0 00     Basophils Absolute 01/17/2019 0 10     Hemoglobin A1C 01/17/2019 5 5     EAG 01/17/2019 111     TSH 3RD GENERATON 01/17/2019 3 280     POC Glucose 01/16/2019 114*    Ventricular Rate 01/17/2019 74     Atrial Rate 01/17/2019 74     MA Interval 01/17/2019 160     QRSD Interval 01/17/2019 90     QT Interval 01/17/2019 404     QTC Interval 01/17/2019 448     P Axis 01/17/2019 78     QRS Axis 01/17/2019 -58     T Wave Axis 01/17/2019 54     Lithium Lvl 01/22/2019 0 5*    [place] Lvl 01/23/2019 0 5*    [place] Lvl 01/29/2019 0 8     WBC 02/09/2019 10 20     RBC 02/09/2019 4 12     Hemoglobin 02/09/2019 12 9     Hematocrit 02/09/2019 39 9     MCV 02/09/2019 97     MCH 02/09/2019 31 4     MCHC 02/09/2019 32 4     RDW 02/09/2019 14 2     MPV 02/09/2019 9 7     Platelets 94/86/1823 237     Neutrophils Relative 02/09/2019 61     Lymphocytes Relative 02/09/2019 27     Monocytes Relative 02/09/2019 9     Eosinophils Relative 02/09/2019 2     Basophils Relative 02/09/2019 1     Neutrophils Absolute 02/09/2019 6 20     Lymphocytes Absolute 02/09/2019 2 80     Monocytes Absolute 02/09/2019 0 90     Eosinophils Absolute 02/09/2019 0 20     Basophils Absolute 02/09/2019 0 10     Sodium 02/09/2019 138     Potassium 02/09/2019 4 0     Chloride 02/09/2019 104     CO2 02/09/2019 27     ANION GAP 02/09/2019 7     BUN 02/09/2019 11     Creatinine 02/09/2019 0 69     Glucose 02/09/2019 94     Calcium 02/09/2019 9 4     AST 02/09/2019 17     ALT 02/09/2019 18     Alkaline Phosphatase 02/09/2019 89     Total Protein 02/09/2019 6 2     Albumin 02/09/2019 3 7     Total Bilirubin 02/09/2019 0 50     eGFR 02/09/2019 94     Lithium Lvl 02/09/2019 0 9     Ventricular Rate 02/11/2019 92     Atrial Rate 02/11/2019 92     VT Interval 02/11/2019 160     QRSD Interval 02/11/2019 90     QT Interval 02/11/2019 356     QTC Interval 02/11/2019 440     P Axis 02/11/2019 78     QRS Axis 02/11/2019 -62     T Wave Axis 02/11/2019 77     Lithium Lvl 02/22/2019 1 0     Lithium Lvl 03/14/2019 1 1     Sodium 03/14/2019 139     Potassium 03/14/2019 4 3     Chloride 03/14/2019 106     CO2 03/14/2019 27     ANION GAP 03/14/2019 6     BUN 03/14/2019 18     Creatinine 03/14/2019 0 66     Glucose 03/14/2019 80     Calcium 03/14/2019 9 3     eGFR 03/14/2019 96        Discharge Medications:    See after visit summary for reconciled discharge medications provided to patient and family  Discharge instructions/Information to patient and family:     See after visit summary for information provided to patient and family  Provisions for Follow-Up Care:    See after visit summary for information related to follow-up care and any pertinent home health orders  Discharge Statement     I spent 25 minutes discharging the patient  This time was spent on the day of discharge  I had direct contact with the patient on the day of discharge

## 2019-03-20 NOTE — PLAN OF CARE
[facility] referrals were made on pt's behalf and placement at [facility] was secured  [facility] team referral was also made on pt's behalf and secured through  [facility]  Follow up appt arranged with  [facility]'s onsite physicians  Discharge planning discussed with pt, [facility] team, and [facility] staff   Transportation provided by [facility] team

## 2019-03-20 NOTE — PROGRESS NOTES
Pt was calm and cooperative with care  Pt was medication compliant  Pt reported anxiety RT discharge  Pt was out in the milieu, with minimal interaction with peers  Pt is looking forward to discharge  Will continue to monitor

## 2019-03-20 NOTE — PLAN OF CARE
Pt attended morning group on Spring mindfulness  She refused to joined the group table yet blamed group for not speaking loud enough  She was encouraged several times to come closer to the session  Pt refused to say identify anything that she might be saying goodbye to today  She reports that she  might be seeing the staff again one day  Scheduled for discharge this morning

## 2019-03-20 NOTE — PLAN OF CARE
Problem: Alteration in Thoughts and Perception  Goal: Treatment Goal: Gain control of psychotic behaviors/thinking, reduce/eliminate presenting symptoms and demonstrate improved reality functioning upon discharge  Outcome: Adequate for Discharge     Problem: Depression  Goal: Treatment Goal: Demonstrate behavioral control of depressive symptoms, verbalize feelings of improved mood/affect, and adopt new coping skills prior to discharge  Outcome: Adequate for Discharge  Goal: Verbalize thoughts and feelings  Description  Interventions:  - Assess and re-assess patient's level of risk   - Engage patient in 1:1 interactions, daily, for a minimum of 15 minutes   - Encourage patient to express feelings, fears, frustrations, hopes    Outcome: Adequate for Discharge  Goal: Refrain from harming self  Description  Interventions:  - Monitor patient closely, per order   - Supervise medication ingestion, monitor effects and side effects    Outcome: Adequate for Discharge  Goal: Refrain from isolation  Description  Interventions:  - Develop a trusting relationship   - Encourage socialization    Outcome: Adequate for Discharge  Goal: Refrain from self-neglect  Outcome: Adequate for Discharge  Goal: Attend and participate in unit activities, including therapeutic, recreational, and educational groups  Description  Interventions:  - Provide therapeutic and educational activities daily, encourage attendance and participation, and document same in the medical record    Outcome: Adequate for Discharge  Goal: Complete daily ADLs, including personal hygiene independently, as able  Description  Interventions:  - Observe, teach, and assist patient with ADLS  -  Monitor and promote a balance of rest/activity, with adequate nutrition and elimination    Outcome: Adequate for Discharge     Problem: Anxiety  Goal: Anxiety is at manageable level  Description  Interventions:  - Assess and monitor patient's anxiety level     - Monitor for signs and symptoms of anxiety both physical and emotional (heart palpitations, chest pain, shortness of breath, headaches, nausea, feeling jumpy, restlessness, irritable, apprehensive)  - Collaborate with interdisciplinary team and initiate plan and interventions as ordered    - [place] patient to unit/surroundings  - Explain treatment plan  - Encourage participation in care  - Encourage verbalization of concerns/fears  - Identify coping mechanisms  - Assist in developing anxiety-reducing skills  - Administer/offer alternative therapies  - Limit or eliminate stimulants   Outcome: Adequate for Discharge     Problem: Ineffective Coping  Goal: Participates in unit activities  Description  Interventions:  - Provide therapeutic environment   - Provide required programming   - Redirect inappropriate behaviors    Outcome: Adequate for Discharge

## 2019-03-20 NOTE — NURSING NOTE
Pt left accompanied by [facility] Team member  JOHAN and scripts reviewed with [facility] Team member  Lorin returned

## 2019-03-21 RX ORDER — ACETAMINOPHEN 325 MG/1
650 TABLET ORAL EVERY 6 HOURS PRN
Qty: 30 TABLET | Refills: 0 | Status: SHIPPED | OUTPATIENT
Start: 2019-03-21 | End: 2019-04-18 | Stop reason: SDUPTHER

## 2019-03-21 RX ORDER — LEVALBUTEROL INHALATION SOLUTION 1.25 MG/3ML
1.25 SOLUTION RESPIRATORY (INHALATION) EVERY 6 HOURS PRN
Qty: 100 VIAL | Refills: 0 | Status: SHIPPED | OUTPATIENT
Start: 2019-03-21 | End: 2019-04-12

## 2019-03-21 RX ORDER — EPINEPHRINE 0.15 MG/.3ML
0.15 INJECTION INTRAMUSCULAR ONCE AS NEEDED
Qty: 1 EACH | Refills: 0 | Status: ON HOLD | OUTPATIENT
Start: 2019-03-21 | End: 2020-01-01

## 2019-03-21 RX ORDER — LEVOTHYROXINE SODIUM 0.12 MG/1
125 TABLET ORAL DAILY
Qty: 30 TABLET | Refills: 0 | Status: SHIPPED | OUTPATIENT
Start: 2019-03-21 | End: 2019-04-18 | Stop reason: SDUPTHER

## 2019-03-21 RX ORDER — PANTOPRAZOLE SODIUM 20 MG/1
20 TABLET, DELAYED RELEASE ORAL DAILY
Qty: 30 TABLET | Refills: 0 | Status: SHIPPED | OUTPATIENT
Start: 2019-03-21 | End: 2019-04-18 | Stop reason: SDUPTHER

## 2019-03-21 RX ORDER — NICOTINE 21 MG/24HR
PATCH, TRANSDERMAL 24 HOURS TRANSDERMAL
Qty: 28 PATCH | Refills: 0 | Status: SHIPPED | OUTPATIENT
Start: 2019-03-21 | End: 2019-04-07

## 2019-03-24 ENCOUNTER — HOSPITAL ENCOUNTER (EMERGENCY)
Facility: HOSPITAL | Age: 64
Discharge: HOME | End: 2019-03-24
Attending: EMERGENCY MEDICINE
Payer: COMMERCIAL

## 2019-03-24 VITALS
BODY MASS INDEX: 23.03 KG/M2 | TEMPERATURE: 99 F | RESPIRATION RATE: 20 BRPM | SYSTOLIC BLOOD PRESSURE: 120 MMHG | OXYGEN SATURATION: 98 % | HEART RATE: 93 BPM | DIASTOLIC BLOOD PRESSURE: 91 MMHG | WEIGHT: 130 LBS

## 2019-03-24 DIAGNOSIS — J44.1 COPD WITH ACUTE EXACERBATION (HCC): ICD-10-CM

## 2019-03-24 DIAGNOSIS — F41.9 ACUTE ANXIETY: Primary | ICD-10-CM

## 2019-03-24 PROCEDURE — 96372 THER/PROPH/DIAG INJ SC/IM: CPT

## 2019-03-24 PROCEDURE — 99285 EMERGENCY DEPT VISIT HI MDM: CPT

## 2019-03-24 PROCEDURE — 94640 AIRWAY INHALATION TREATMENT: CPT

## 2019-03-24 RX ORDER — ALBUTEROL SULFATE 90 UG/1
AEROSOL, METERED RESPIRATORY (INHALATION)
Status: DISCONTINUED
Start: 2019-03-24 | End: 2019-03-25 | Stop reason: HOSPADM

## 2019-03-24 RX ORDER — LORAZEPAM 1 MG/1
1 TABLET ORAL 3 TIMES DAILY PRN
Qty: 15 TABLET | Refills: 0 | Status: SHIPPED | OUTPATIENT
Start: 2019-03-24 | End: 2019-04-07

## 2019-03-24 RX ORDER — IPRATROPIUM BROMIDE AND ALBUTEROL SULFATE 2.5; .5 MG/3ML; MG/3ML
3 SOLUTION RESPIRATORY (INHALATION)
Status: DISCONTINUED | OUTPATIENT
Start: 2019-03-24 | End: 2019-03-25 | Stop reason: HOSPADM

## 2019-03-24 RX ORDER — ALBUTEROL SULFATE 90 UG/1
2 AEROSOL, METERED RESPIRATORY (INHALATION) ONCE
Status: COMPLETED | OUTPATIENT
Start: 2019-03-24 | End: 2019-03-24

## 2019-03-24 RX ORDER — LORAZEPAM 2 MG/ML
2 INJECTION INTRAMUSCULAR ONCE
Status: COMPLETED | OUTPATIENT
Start: 2019-03-24 | End: 2019-03-24

## 2019-03-24 RX ADMIN — ALBUTEROL SULFATE 2 PUFF: 90 AEROSOL, METERED RESPIRATORY (INHALATION) at 22:09

## 2019-03-24 RX ADMIN — LORAZEPAM 2 MG: 2 INJECTION INTRAMUSCULAR; INTRAVENOUS at 19:39

## 2019-03-24 RX ADMIN — PREDNISONE 50 MG: 10 TABLET ORAL at 19:39

## 2019-03-24 RX ADMIN — IPRATROPIUM BROMIDE AND ALBUTEROL SULFATE 3 ML: 2.5; .5 SOLUTION RESPIRATORY (INHALATION) at 19:39

## 2019-03-25 NOTE — DISCHARGE INSTRUCTIONS
Please try to stop smoking immediately  and avoid caffeine and stimulating drugs or food like chocolate and sugary drinks

## 2019-03-25 NOTE — ED NOTES
Patient came to the ed today by EMS complaining she needs her rescue inhaler and she is not treated well where she is staying  Patient has history of COPD but continues to smoke and was angry she couldn't use her rescue inhaler today  [facility] worker Maite Varghese called and she came in to do face to face and said she was at baseline  Pt denied any SI/HI or plans  No reports of hallucinations  Pt was paranoid that the [facility] Staff just wanted to be mean to her  [facility] staff will transport her home with script for rescue inhaler so she can use it at home   [facility] will follow up with her tomorrow also

## 2019-03-25 NOTE — ED PROVIDER NOTES
History  Chief Complaint   Patient presents with    Shortness of Breath     pt arrives from [facility] for SOB  pt states "I need O2!" while wearing oxygen via NC  pt ambulated into room without difficulty  per EMS, pt took oxygen off to smoke and developed difficulty breathing, pt requested inhaler from staff at [place] and was told it wasn't ordered so she couldn't receive it  pt states that SOB is due to anxiety  pt speaking in full sentences without difficulty  pt states that she doesn't trust facility that she is at and doesn't like to be there  Patient brought with EMS complaining of she has oxygen and she is not getting treated well where she is staying  Patient has history of COPD but continues to smoke  Patient was brought in by EMS and the patient is speaking in full sentences without any difficulty but she says she is unhappy where she is living is having trouble breathing  Prior to Admission Medications   Prescriptions Last Dose Informant Patient Reported? Taking? EPINEPHrine (EPIPEN JR) 0 15 mg/0 3 mL SOAJ   No No   Sig: Inject 0 3 mL (0 15 mg total) into a muscle once as needed for anaphylaxis As needed for allergic reaction   OXYGEN-HELIUM IN   Yes No   Sig: Inhale 3 L  QUEtiapine (SEROquel) 50 mg tablet   No No   Sig: Take 1 tablet (50 mg total) by mouth daily   QUEtiapine (SEROquel) 50 mg tablet   No No   Sig: Take 9 tablets (450 mg total) by mouth daily at bedtime   acetaminophen (TYLENOL) 325 mg tablet   No No   Sig: Take 2 tablets (650 mg total) by mouth every 6 (six) hours as needed for mild pain   fluticasone-salmeterol (ADVAIR) 500-50 mcg/dose inhaler   No No   Sig: Inhale 1 puff 2 (two) times a day Rinse mouth after use     levalbuterol (XOPENEX) 1 25 mg/3 mL nebulizer solution   No No   Sig: Take 1 vial (1 25 mg total) by nebulization every 6 (six) hours as needed for wheezing   levothyroxine 125 mcg tablet   No No   Sig: Take 1 tablet (125 mcg total) by mouth daily lithium carbonate (LITHOBID) 300 mg CR tablet   No No   Sig: Take 1 tablet (300 mg total) by mouth daily   lithium carbonate (LITHOBID) 450 mg CR tablet   No No   Sig: Take 1 tablet (450 mg total) by mouth daily at bedtime   nicotine (NICODERM CQ) 14 mg/24hr TD 24 hr patch   No No   Sig: Please use the 14 mg patch daily for 2 weeks then decrease to the 7 mg patch daily for 2 weeks  pantoprazole (PROTONIX) 20 mg tablet   No No   Sig: Take 1 tablet (20 mg total) by mouth daily   theophylline (JEF-24) 200 MG 24 hr capsule   No No   Sig: Take 1 capsule (200 mg total) by mouth daily      Facility-Administered Medications: None       Past Medical History:   Diagnosis Date    Anxiety     COPD [facility]chronic obstructive pulmonary disease) ([facility] )     Depression     GERD (gastroesophageal reflux disease)     Schizoaffective disorder ([place] [address] )        History reviewed  No pertinent surgical history  History reviewed  No pertinent family history  I have reviewed and agree with the history as documented  Social History     Tobacco Use    Smoking status: Current Every Day Smoker     Packs/day: 0 25    Smokeless tobacco: Never Used   Substance Use Topics    Alcohol use: No    Drug use: No        Review of Systems   Constitutional: Negative  HENT: Negative  Eyes: Negative  Respiratory: Negative  Cardiovascular: Negative  Gastrointestinal: Negative  Endocrine: Negative  Genitourinary: Negative  Allergic/Immunologic: Negative  Neurological: Negative  Hematological: Negative  Psychiatric/Behavioral: Positive for behavioral problems  The patient is nervous/anxious  All other systems reviewed and are negative  Physical Exam  Physical Exam   Constitutional: She is oriented to person, place, and time  She appears well-developed and well-nourished  No distress  HENT:   Head: Normocephalic and atraumatic  Eyes: Pupils are equal, round, and reactive to light   Conjunctivae and EOM are normal    Neck: Normal range of motion  Neck supple  Cardiovascular: Normal rate, regular rhythm, normal heart sounds and intact distal pulses  Exam reveals no friction rub  No murmur heard  Pulmonary/Chest: Effort normal  No respiratory distress  She has wheezes  Abdominal: Soft  Bowel sounds are normal  She exhibits no distension and no mass  There is no tenderness  There is no rebound and no guarding  Musculoskeletal: Normal range of motion  Neurological: She is alert and oriented to person, place, and time  No cranial nerve deficit  Skin: Skin is warm  Capillary refill takes less than 2 seconds  No rash noted  She is not diaphoretic  No erythema  Psychiatric: Judgment and thought content normal  Her mood appears anxious  She is agitated  Vital Signs  ED Triage Vitals [03/24/19 1930]   Temperature Pulse Respirations Blood Pressure SpO2   99 °F (37 2 °C) 97 20 157/90 99 %      Temp Source Heart Rate Source Patient Position - Orthostatic VS BP Location FiO2 (%)   Tympanic Monitor Sitting Left arm --      Pain Score       No Pain           Vitals:    03/24/19 1930 03/24/19 2118   BP: 157/90 120/91   Pulse: 97 93   Patient Position - Orthostatic VS: Sitting Sitting         Visual Acuity      ED Medications  Medications   ipratropium-albuterol (DUO-NEB) 0 5-2 5 mg/3 mL inhalation solution 3 mL (3 mL Nebulization Given 3/24/19 1939)   LORazepam (ATIVAN) 2 mg/mL injection 2 mg (2 mg Intramuscular Given 3/24/19 1939)   predniSONE tablet 50 mg (50 mg Oral Given 3/24/19 1939)       Diagnostic Studies  Results Reviewed     None                 No orders to display              Procedures  Procedures       Phone Contacts  ED Phone Contact    ED Course                               MDM  Number of Diagnoses or Management Options  Diagnosis management comments: Acute anxiety, COPD exacerbation,  Patient will be referred to the crisis worker for disposition        Disposition  Final diagnoses:   COPD with acute exacerbation ([facility] )   Acute anxiety     Time reflects when diagnosis was documented in both MDM as applicable and the Disposition within this note     Time User Action Codes Description Comment    3/24/2019  9:17 PM Ritta Seal Add [J44 1] COPD with acute exacerbation ([facility] )     3/24/2019  9:18 PM Ritta Seal Add [F41 9] Acute anxiety     3/24/2019  9:18 PM Ritta Seal Modify [J44 1] COPD with acute exacerbation ([facility] )     3/24/2019  9:18 PM Ritta Seal Modify [F41 9] Acute anxiety       ED Disposition     ED Disposition Condition Date/Time Comment    Discharge Stable Sun Mar 24, 2019  9:17 PM Michae Niles discharge to home/self care  Follow-up Information     Follow up With Specialties Details Why Contact Info    Sandy Ocasio MD [facility] Medicine Schedule an appointment as soon as possible for a visit  If symptoms worsen [address] [place] [address] [address]  [phone]            Patient's Medications   Discharge Prescriptions    LORAZEPAM (ATIVAN) 1 MG TABLET    Take 1 tablet (1 mg total) by mouth 3 (three) times a day as needed for anxiety for up to 10 days       Start Date: 3/24/2019 End Date: 4/3/2019       Order Dose: 1 mg       Quantity: 15 tablet    Refills: 0     No discharge procedures on file      ED Provider  Electronically Signed by           Jon Douglas MD  03/24/19 6436

## 2019-03-25 NOTE — ED NOTES
Awaiting  arrival prior to discharge to discuss treatment options at facility that she resides     [facility]  03/24/19 3659

## 2019-03-29 ENCOUNTER — HOSPITAL ENCOUNTER (EMERGENCY)
Facility: HOSPITAL | Age: 64
Discharge: HOME | End: 2019-03-29
Attending: EMERGENCY MEDICINE
Payer: COMMERCIAL

## 2019-03-29 VITALS
HEIGHT: 64 IN | WEIGHT: 130.07 LBS | DIASTOLIC BLOOD PRESSURE: 75 MMHG | OXYGEN SATURATION: 95 % | HEART RATE: 102 BPM | SYSTOLIC BLOOD PRESSURE: 128 MMHG | RESPIRATION RATE: 20 BRPM | BODY MASS INDEX: 22.21 KG/M2 | TEMPERATURE: 97.4 F

## 2019-03-29 DIAGNOSIS — E16.2 HYPOGLYCEMIA, UNSPECIFIED: ICD-10-CM

## 2019-03-29 DIAGNOSIS — R63.1 POLYDIPSIA: ICD-10-CM

## 2019-03-29 DIAGNOSIS — J44.9 COPD WITH ASTHMA (HCC): Primary | ICD-10-CM

## 2019-03-29 PROCEDURE — 99283 EMERGENCY DEPT VISIT LOW MDM: CPT

## 2019-03-29 RX ORDER — ALBUTEROL SULFATE 90 UG/1
2 AEROSOL, METERED RESPIRATORY (INHALATION) EVERY 4 HOURS PRN
Qty: 1 INHALER | Refills: 0 | Status: SHIPPED | OUTPATIENT
Start: 2019-03-29 | End: 2019-04-18 | Stop reason: SDUPTHER

## 2019-03-29 RX ORDER — LEVALBUTEROL 1.25 MG/.5ML
1.25 SOLUTION, CONCENTRATE RESPIRATORY (INHALATION) ONCE
Status: COMPLETED | OUTPATIENT
Start: 2019-03-29 | End: 2019-03-29

## 2019-03-29 RX ORDER — ALBUTEROL SULFATE 90 UG/1
2 AEROSOL, METERED RESPIRATORY (INHALATION) EVERY 4 HOURS PRN
Qty: 1 INHALER | Refills: 0 | Status: SHIPPED | OUTPATIENT
Start: 2019-03-29 | End: 2019-03-29 | Stop reason: SDUPTHER

## 2019-03-29 RX ADMIN — LEVALBUTEROL HYDROCHLORIDE 1.25 MG: 1.25 SOLUTION, CONCENTRATE RESPIRATORY (INHALATION) at 07:20

## 2019-04-07 ENCOUNTER — HOSPITAL ENCOUNTER (EMERGENCY)
Facility: HOSPITAL | Age: 64
Discharge: HOME | End: 2019-04-07
Attending: EMERGENCY MEDICINE | Admitting: EMERGENCY MEDICINE
Payer: COMMERCIAL

## 2019-04-07 ENCOUNTER — APPOINTMENT (EMERGENCY)
Dept: RADIOLOGY | Facility: HOSPITAL | Age: 64
End: 2019-04-07
Payer: COMMERCIAL

## 2019-04-07 VITALS
SYSTOLIC BLOOD PRESSURE: 124 MMHG | RESPIRATION RATE: 18 BRPM | OXYGEN SATURATION: 94 % | DIASTOLIC BLOOD PRESSURE: 77 MMHG | TEMPERATURE: 98.9 F | HEART RATE: 108 BPM

## 2019-04-07 DIAGNOSIS — J44.9 COPD (CHRONIC OBSTRUCTIVE PULMONARY DISEASE) (HCC): ICD-10-CM

## 2019-04-07 DIAGNOSIS — Z00.00 GENERAL MEDICAL EXAM: Primary | ICD-10-CM

## 2019-04-07 LAB
ANION GAP SERPL CALCULATED.3IONS-SCNC: 10 MMOL/L (ref 5–14)
ATRIAL RATE: 116 BPM
BASOPHILS # BLD AUTO: 0.1 THOUSANDS/ΜL (ref 0–0.1)
BASOPHILS NFR BLD AUTO: 1 % (ref 0–1)
BUN SERPL-MCNC: 10 MG/DL (ref 5–25)
CALCIUM SERPL-MCNC: 9.5 MG/DL (ref 8.4–10.2)
CHLORIDE SERPL-SCNC: 104 MMOL/L (ref 97–108)
CO2 SERPL-SCNC: 24 MMOL/L (ref 22–30)
CREAT SERPL-MCNC: 0.62 MG/DL (ref 0.6–1.2)
EOSINOPHIL # BLD AUTO: 0.1 THOUSAND/ΜL (ref 0–0.4)
EOSINOPHIL NFR BLD AUTO: 1 % (ref 0–6)
ERYTHROCYTE [DISTWIDTH] IN BLOOD BY AUTOMATED COUNT: 14.4 %
GFR SERPL CREATININE-BSD FRML MDRD: 98 ML/MIN/1.73SQ M
GLUCOSE SERPL-MCNC: 102 MG/DL (ref 70–99)
HCT VFR BLD AUTO: 41 % (ref 36–46)
HGB BLD-MCNC: 13.5 G/DL (ref 12–16)
LYMPHOCYTES # BLD AUTO: 2.1 THOUSANDS/ΜL (ref 0.5–4)
LYMPHOCYTES NFR BLD AUTO: 18 % (ref 25–45)
MCH RBC QN AUTO: 32.8 PG (ref 26–34)
MCHC RBC AUTO-ENTMCNC: 32.9 G/DL (ref 31–36)
MCV RBC AUTO: 100 FL (ref 80–100)
MONOCYTES # BLD AUTO: 0.9 THOUSAND/ΜL (ref 0.2–0.9)
MONOCYTES NFR BLD AUTO: 8 % (ref 1–10)
NEUTROPHILS # BLD AUTO: 8.5 THOUSANDS/ΜL (ref 1.8–7.8)
NEUTS SEG NFR BLD AUTO: 72 % (ref 45–65)
PLATELET # BLD AUTO: 278 THOUSANDS/UL (ref 150–450)
PMV BLD AUTO: 9.5 FL (ref 8.9–12.7)
POTASSIUM SERPL-SCNC: 3.5 MMOL/L (ref 3.6–5)
QRS AXIS: -60 DEGREES
QRSD INTERVAL: 98 MS
QT INTERVAL: 354 MS
QTC INTERVAL: 492 MS
RBC # BLD AUTO: 4.11 MILLION/UL (ref 4–5.2)
SODIUM SERPL-SCNC: 138 MMOL/L (ref 137–147)
T WAVE AXIS: 72 DEGREES
TROPONIN I SERPL-MCNC: <0.01 NG/ML (ref 0–0.03)
VENTRICULAR RATE: 116 BPM
WBC # BLD AUTO: 11.8 THOUSAND/UL (ref 4.5–11)

## 2019-04-07 PROCEDURE — 80048 BASIC METABOLIC PNL TOTAL CA: CPT | Performed by: EMERGENCY MEDICINE

## 2019-04-07 PROCEDURE — 85025 COMPLETE CBC W/AUTO DIFF WBC: CPT | Performed by: EMERGENCY MEDICINE

## 2019-04-07 PROCEDURE — 36415 COLL VENOUS BLD VENIPUNCTURE: CPT | Performed by: EMERGENCY MEDICINE

## 2019-04-07 PROCEDURE — 99283 EMERGENCY DEPT VISIT LOW MDM: CPT | Performed by: EMERGENCY MEDICINE

## 2019-04-07 PROCEDURE — 93005 ELECTROCARDIOGRAM TRACING: CPT

## 2019-04-07 PROCEDURE — 99284 EMERGENCY DEPT VISIT MOD MDM: CPT

## 2019-04-07 PROCEDURE — 93010 ELECTROCARDIOGRAM REPORT: CPT | Performed by: INTERNAL MEDICINE

## 2019-04-07 PROCEDURE — 84484 ASSAY OF TROPONIN QUANT: CPT | Performed by: EMERGENCY MEDICINE

## 2019-04-07 PROCEDURE — 71045 X-RAY EXAM CHEST 1 VIEW: CPT

## 2019-04-07 PROCEDURE — 94640 AIRWAY INHALATION TREATMENT: CPT

## 2019-04-07 RX ADMIN — ALBUTEROL SULFATE 5 MG: 2.5 SOLUTION RESPIRATORY (INHALATION) at 11:25

## 2019-04-12 ENCOUNTER — HOSPITAL ENCOUNTER (EMERGENCY)
Facility: HOSPITAL | Age: 64
Discharge: HOME | End: 2019-04-12
Attending: EMERGENCY MEDICINE | Admitting: EMERGENCY MEDICINE
Payer: COMMERCIAL

## 2019-04-12 ENCOUNTER — APPOINTMENT (EMERGENCY)
Dept: RADIOLOGY | Facility: HOSPITAL | Age: 64
End: 2019-04-12
Payer: COMMERCIAL

## 2019-04-12 VITALS
DIASTOLIC BLOOD PRESSURE: 59 MMHG | RESPIRATION RATE: 18 BRPM | BODY MASS INDEX: 22.33 KG/M2 | TEMPERATURE: 99.4 F | HEIGHT: 64 IN | OXYGEN SATURATION: 96 % | SYSTOLIC BLOOD PRESSURE: 127 MMHG | HEART RATE: 96 BPM

## 2019-04-12 DIAGNOSIS — J44.9 COPD (CHRONIC OBSTRUCTIVE PULMONARY DISEASE) (HCC): ICD-10-CM

## 2019-04-12 DIAGNOSIS — F25.9 SCHIZOAFFECTIVE DISORDER (HCC): Primary | ICD-10-CM

## 2019-04-12 LAB
ATRIAL RATE: 94 BPM
GLUCOSE SERPL-MCNC: 96 MG/DL (ref 65–140)
P AXIS: 83 DEGREES
PR INTERVAL: 132 MS
QRS AXIS: -58 DEGREES
QRSD INTERVAL: 88 MS
QT INTERVAL: 376 MS
QTC INTERVAL: 470 MS
T WAVE AXIS: 64 DEGREES
VENTRICULAR RATE: 94 BPM

## 2019-04-12 PROCEDURE — 94640 AIRWAY INHALATION TREATMENT: CPT

## 2019-04-12 PROCEDURE — 93005 ELECTROCARDIOGRAM TRACING: CPT

## 2019-04-12 PROCEDURE — 82948 REAGENT STRIP/BLOOD GLUCOSE: CPT

## 2019-04-12 PROCEDURE — 99285 EMERGENCY DEPT VISIT HI MDM: CPT

## 2019-04-12 PROCEDURE — 93010 ELECTROCARDIOGRAM REPORT: CPT | Performed by: INTERNAL MEDICINE

## 2019-04-12 PROCEDURE — 99284 EMERGENCY DEPT VISIT MOD MDM: CPT | Performed by: EMERGENCY MEDICINE

## 2019-04-12 PROCEDURE — 71046 X-RAY EXAM CHEST 2 VIEWS: CPT

## 2019-04-12 RX ORDER — ALBUTEROL SULFATE 90 UG/1
2 AEROSOL, METERED RESPIRATORY (INHALATION) 4 TIMES DAILY
Status: DISCONTINUED | OUTPATIENT
Start: 2019-04-12 | End: 2019-04-12 | Stop reason: HOSPADM

## 2019-04-12 RX ORDER — ALBUTEROL SULFATE 2.5 MG/3ML
2.5 SOLUTION RESPIRATORY (INHALATION) ONCE
Status: COMPLETED | OUTPATIENT
Start: 2019-04-12 | End: 2019-04-12

## 2019-04-12 RX ORDER — ALBUTEROL SULFATE 90 UG/1
2 AEROSOL, METERED RESPIRATORY (INHALATION) ONCE
Status: COMPLETED | OUTPATIENT
Start: 2019-04-12 | End: 2019-04-12

## 2019-04-12 RX ADMIN — ALBUTEROL SULFATE 2.5 MG: 2.5 SOLUTION RESPIRATORY (INHALATION) at 15:17

## 2019-04-12 RX ADMIN — ALBUTEROL SULFATE 2 PUFF: 90 AEROSOL, METERED RESPIRATORY (INHALATION) at 18:06

## 2019-04-12 RX ADMIN — ALBUTEROL SULFATE 2 PUFF: 90 AEROSOL, METERED RESPIRATORY (INHALATION) at 16:52

## 2019-04-15 ENCOUNTER — PATIENT OUTREACH (OUTPATIENT)
Dept: FAMILY MEDICINE CLINIC | Facility: CLINIC | Age: 64
End: 2019-04-15

## 2019-04-18 ENCOUNTER — OFFICE VISIT (OUTPATIENT)
Dept: FAMILY MEDICINE CLINIC | Facility: CLINIC | Age: 64
End: 2019-04-18

## 2019-04-18 VITALS
HEART RATE: 112 BPM | OXYGEN SATURATION: 98 % | SYSTOLIC BLOOD PRESSURE: 122 MMHG | TEMPERATURE: 97.6 F | HEIGHT: 64 IN | BODY MASS INDEX: 24.92 KG/M2 | WEIGHT: 146 LBS | DIASTOLIC BLOOD PRESSURE: 86 MMHG | RESPIRATION RATE: 18 BRPM

## 2019-04-18 DIAGNOSIS — M25.552 LEFT HIP PAIN: ICD-10-CM

## 2019-04-18 DIAGNOSIS — Z00.00 HEALTHCARE MAINTENANCE: ICD-10-CM

## 2019-04-18 DIAGNOSIS — R22.43 LOCALIZED SWELLING OF BOTH LOWER LEGS: ICD-10-CM

## 2019-04-18 DIAGNOSIS — J44.9 COPD WITH ASTHMA (HCC): ICD-10-CM

## 2019-04-18 DIAGNOSIS — E03.9 ACQUIRED HYPOTHYROIDISM: Primary | ICD-10-CM

## 2019-04-18 PROCEDURE — 99204 OFFICE O/P NEW MOD 45 MIN: CPT | Performed by: PHYSICIAN ASSISTANT

## 2019-04-18 PROCEDURE — 3008F BODY MASS INDEX DOCD: CPT | Performed by: PHYSICIAN ASSISTANT

## 2019-04-19 ENCOUNTER — TELEPHONE (OUTPATIENT)
Dept: FAMILY MEDICINE CLINIC | Facility: CLINIC | Age: 64
End: 2019-04-19

## 2019-04-19 DIAGNOSIS — J44.9 COPD WITH ASTHMA (HCC): Primary | ICD-10-CM

## 2019-04-19 RX ORDER — ALBUTEROL SULFATE 90 UG/1
2 AEROSOL, METERED RESPIRATORY (INHALATION) EVERY 4 HOURS PRN
Qty: 1 INHALER | Refills: 5 | Status: ON HOLD | OUTPATIENT
Start: 2019-04-19 | End: 2020-01-01

## 2019-04-19 RX ORDER — ACETAMINOPHEN 325 MG/1
650 TABLET ORAL EVERY 6 HOURS PRN
Qty: 90 TABLET | Refills: 3 | Status: ON HOLD | OUTPATIENT
Start: 2019-04-19 | End: 2020-01-01

## 2019-04-19 RX ORDER — PANTOPRAZOLE SODIUM 20 MG/1
20 TABLET, DELAYED RELEASE ORAL DAILY
Qty: 90 TABLET | Refills: 1 | Status: SHIPPED | OUTPATIENT
Start: 2019-04-19 | End: 2019-07-23 | Stop reason: HOSPADM

## 2019-04-19 RX ORDER — ALBUTEROL SULFATE 90 UG/1
2 AEROSOL, METERED RESPIRATORY (INHALATION) EVERY 4 HOURS PRN
Qty: 18 G | Refills: 2 | Status: ON HOLD | OUTPATIENT
Start: 2019-04-19 | End: 2019-05-06 | Stop reason: SDUPTHER

## 2019-04-19 RX ORDER — LEVOTHYROXINE SODIUM 0.12 MG/1
125 TABLET ORAL DAILY
Qty: 90 TABLET | Refills: 1 | Status: SHIPPED | OUTPATIENT
Start: 2019-04-19 | End: 2019-07-23 | Stop reason: HOSPADM

## 2019-04-22 PROBLEM — R22.43 LOCALIZED SWELLING OF BOTH LOWER LEGS: Status: ACTIVE | Noted: 2019-04-22

## 2019-05-01 ENCOUNTER — HOSPITAL ENCOUNTER (INPATIENT)
Facility: HOSPITAL | Age: 64
LOS: 1 days | DRG: 750 | End: 2019-05-02
Attending: EMERGENCY MEDICINE | Admitting: PSYCHIATRY & NEUROLOGY
Payer: COMMERCIAL

## 2019-05-01 ENCOUNTER — APPOINTMENT (EMERGENCY)
Dept: RADIOLOGY | Facility: HOSPITAL | Age: 64
DRG: 750 | End: 2019-05-01
Payer: COMMERCIAL

## 2019-05-01 DIAGNOSIS — J96.11 CHRONIC RESPIRATORY FAILURE WITH HYPOXIA (HCC): ICD-10-CM

## 2019-05-01 DIAGNOSIS — F25.9 SCHIZOAFFECTIVE DISORDER (HCC): ICD-10-CM

## 2019-05-01 DIAGNOSIS — J18.9 COMMUNITY ACQUIRED PNEUMONIA: Primary | ICD-10-CM

## 2019-05-01 DIAGNOSIS — Z00.8 MEDICAL CLEARANCE FOR PSYCHIATRIC ADMISSION: ICD-10-CM

## 2019-05-01 DIAGNOSIS — Z00.8 MEDICAL CLEARANCE FOR PSYCHIATRIC ADMISSION: Primary | ICD-10-CM

## 2019-05-01 LAB
ALBUMIN SERPL BCP-MCNC: 4.1 G/DL (ref 3–5.2)
ALP SERPL-CCNC: 114 U/L (ref 43–122)
ALT SERPL W P-5'-P-CCNC: 31 U/L (ref 9–52)
AMPHETAMINES SERPL QL SCN: NEGATIVE
ANION GAP SERPL CALCULATED.3IONS-SCNC: 8 MMOL/L (ref 5–14)
APTT PPP: 31 SECONDS (ref 23–34)
AST SERPL W P-5'-P-CCNC: 23 U/L (ref 14–36)
BACTERIA UR QL AUTO: ABNORMAL /HPF
BARBITURATES UR QL: NEGATIVE
BENZODIAZ UR QL: NEGATIVE
BILIRUB SERPL-MCNC: 0.8 MG/DL
BILIRUB UR QL STRIP: NEGATIVE
BUN SERPL-MCNC: 15 MG/DL (ref 5–25)
CALCIUM SERPL-MCNC: 10 MG/DL (ref 8.4–10.2)
CHLORIDE SERPL-SCNC: 102 MMOL/L (ref 97–108)
CLARITY UR: CLEAR
CO2 SERPL-SCNC: 27 MMOL/L (ref 22–30)
COCAINE UR QL: NEGATIVE
COLOR UR: ABNORMAL
CREAT SERPL-MCNC: 0.62 MG/DL (ref 0.6–1.2)
ERYTHROCYTE [DISTWIDTH] IN BLOOD BY AUTOMATED COUNT: 13.6 %
GFR SERPL CREATININE-BSD FRML MDRD: 98 ML/MIN/1.73SQ M
GLUCOSE SERPL-MCNC: 115 MG/DL (ref 70–99)
GLUCOSE UR STRIP-MCNC: NEGATIVE MG/DL
HCT VFR BLD AUTO: 43 % (ref 36–46)
HGB BLD-MCNC: 14.1 G/DL (ref 12–16)
HGB UR QL STRIP.AUTO: 25
INR PPP: 1 (ref 0.89–1.1)
KETONES UR STRIP-MCNC: ABNORMAL MG/DL
LACTATE SERPL-SCNC: 1.6 MMOL/L (ref 0.7–2)
LEUKOCYTE ESTERASE UR QL STRIP: 25
LITHIUM SERPL-SCNC: <0.2 MMOL/L (ref 0.6–1.2)
LYMPHOCYTES # BLD AUTO: 1.45 THOUSAND/UL (ref 0.5–4)
LYMPHOCYTES # BLD AUTO: 8 % (ref 25–45)
MCH RBC QN AUTO: 31.6 PG (ref 26–34)
MCHC RBC AUTO-ENTMCNC: 32.7 G/DL (ref 31–36)
MCV RBC AUTO: 97 FL (ref 80–100)
METAMYELOCYTES NFR BLD MANUAL: 1 % (ref 0–1)
METHADONE UR QL: NEGATIVE
MONOCYTES # BLD AUTO: 0.72 THOUSAND/UL (ref 0.2–0.9)
MONOCYTES NFR BLD AUTO: 4 % (ref 1–10)
MUCOUS THREADS UR QL AUTO: ABNORMAL
NEUTS BAND NFR BLD MANUAL: 6 % (ref 0–8)
NEUTS SEG # BLD: 15.75 THOUSAND/UL (ref 1.8–7.8)
NEUTS SEG NFR BLD AUTO: 81 %
NITRITE UR QL STRIP: NEGATIVE
NON-SQ EPI CELLS URNS QL MICRO: ABNORMAL /HPF
OPIATES UR QL SCN: NEGATIVE
PCP UR QL: NEGATIVE
PH UR STRIP.AUTO: 6 [PH]
PLATELET # BLD AUTO: 246 THOUSANDS/UL (ref 150–450)
PLATELET BLD QL SMEAR: ADEQUATE
PMV BLD AUTO: 9 FL (ref 8.9–12.7)
POTASSIUM SERPL-SCNC: 3.9 MMOL/L (ref 3.6–5)
PROT SERPL-MCNC: 7.1 G/DL (ref 5.9–8.4)
PROT UR STRIP-MCNC: ABNORMAL MG/DL
PROTHROMBIN TIME: 10.6 SECONDS (ref 9.5–11.6)
RBC # BLD AUTO: 4.44 MILLION/UL (ref 4–5.2)
RBC #/AREA URNS AUTO: ABNORMAL /HPF
RBC MORPH BLD: NORMAL
SODIUM SERPL-SCNC: 137 MMOL/L (ref 137–147)
SP GR UR STRIP.AUTO: 1.01 (ref 1–1.04)
THC UR QL: NEGATIVE
TOTAL CELLS COUNTED SPEC: 100
TROPONIN I SERPL-MCNC: <0.01 NG/ML (ref 0–0.03)
UROBILINOGEN UA: NEGATIVE MG/DL
WBC # BLD AUTO: 18.1 THOUSAND/UL (ref 4.5–11)
WBC #/AREA URNS AUTO: ABNORMAL /HPF

## 2019-05-01 PROCEDURE — 85610 PROTHROMBIN TIME: CPT | Performed by: EMERGENCY MEDICINE

## 2019-05-01 PROCEDURE — 96361 HYDRATE IV INFUSION ADD-ON: CPT

## 2019-05-01 PROCEDURE — 84484 ASSAY OF TROPONIN QUANT: CPT | Performed by: EMERGENCY MEDICINE

## 2019-05-01 PROCEDURE — 80053 COMPREHEN METABOLIC PANEL: CPT | Performed by: EMERGENCY MEDICINE

## 2019-05-01 PROCEDURE — 81001 URINALYSIS AUTO W/SCOPE: CPT | Performed by: EMERGENCY MEDICINE

## 2019-05-01 PROCEDURE — 87040 BLOOD CULTURE FOR BACTERIA: CPT | Performed by: EMERGENCY MEDICINE

## 2019-05-01 PROCEDURE — 80178 ASSAY OF LITHIUM: CPT | Performed by: EMERGENCY MEDICINE

## 2019-05-01 PROCEDURE — 80307 DRUG TEST PRSMV CHEM ANLYZR: CPT | Performed by: EMERGENCY MEDICINE

## 2019-05-01 PROCEDURE — 83605 ASSAY OF LACTIC ACID: CPT | Performed by: EMERGENCY MEDICINE

## 2019-05-01 PROCEDURE — 85027 COMPLETE CBC AUTOMATED: CPT | Performed by: EMERGENCY MEDICINE

## 2019-05-01 PROCEDURE — 85007 BL SMEAR W/DIFF WBC COUNT: CPT | Performed by: EMERGENCY MEDICINE

## 2019-05-01 PROCEDURE — 99284 EMERGENCY DEPT VISIT MOD MDM: CPT | Performed by: EMERGENCY MEDICINE

## 2019-05-01 PROCEDURE — 71046 X-RAY EXAM CHEST 2 VIEWS: CPT

## 2019-05-01 PROCEDURE — 99285 EMERGENCY DEPT VISIT HI MDM: CPT

## 2019-05-01 PROCEDURE — 96360 HYDRATION IV INFUSION INIT: CPT

## 2019-05-01 PROCEDURE — 85730 THROMBOPLASTIN TIME PARTIAL: CPT | Performed by: EMERGENCY MEDICINE

## 2019-05-01 PROCEDURE — 93005 ELECTROCARDIOGRAM TRACING: CPT

## 2019-05-01 PROCEDURE — 36415 COLL VENOUS BLD VENIPUNCTURE: CPT | Performed by: EMERGENCY MEDICINE

## 2019-05-01 RX ORDER — TRAZODONE HYDROCHLORIDE 50 MG/1
25 TABLET ORAL
Status: CANCELLED | OUTPATIENT
Start: 2019-05-01

## 2019-05-01 RX ORDER — RISPERIDONE 1 MG/1
1 TABLET, ORALLY DISINTEGRATING ORAL EVERY 8 HOURS PRN
Status: DISCONTINUED | OUTPATIENT
Start: 2019-05-01 | End: 2019-05-02 | Stop reason: HOSPADM

## 2019-05-01 RX ORDER — HYDROXYZINE HYDROCHLORIDE 25 MG/1
25 TABLET, FILM COATED ORAL EVERY 6 HOURS PRN
Status: DISCONTINUED | OUTPATIENT
Start: 2019-05-01 | End: 2019-05-02 | Stop reason: HOSPADM

## 2019-05-01 RX ORDER — BENZTROPINE MESYLATE 1 MG/1
1 TABLET ORAL EVERY 6 HOURS PRN
Status: DISCONTINUED | OUTPATIENT
Start: 2019-05-01 | End: 2019-05-02 | Stop reason: HOSPADM

## 2019-05-01 RX ORDER — ACETAMINOPHEN 325 MG/1
650 TABLET ORAL EVERY 4 HOURS PRN
Status: DISCONTINUED | OUTPATIENT
Start: 2019-05-01 | End: 2019-05-02 | Stop reason: HOSPADM

## 2019-05-01 RX ORDER — MAGNESIUM HYDROXIDE/ALUMINUM HYDROXICE/SIMETHICONE 120; 1200; 1200 MG/30ML; MG/30ML; MG/30ML
30 SUSPENSION ORAL EVERY 4 HOURS PRN
Status: DISCONTINUED | OUTPATIENT
Start: 2019-05-01 | End: 2019-05-02 | Stop reason: HOSPADM

## 2019-05-01 RX ORDER — ALBUTEROL SULFATE 2.5 MG/3ML
5 SOLUTION RESPIRATORY (INHALATION) ONCE
Status: COMPLETED | OUTPATIENT
Start: 2019-05-01 | End: 2019-05-01

## 2019-05-01 RX ORDER — HALOPERIDOL 5 MG/ML
2 INJECTION INTRAMUSCULAR EVERY 8 HOURS PRN
Status: CANCELLED | OUTPATIENT
Start: 2019-05-01

## 2019-05-01 RX ORDER — TRAZODONE HYDROCHLORIDE 50 MG/1
25 TABLET ORAL
Status: DISCONTINUED | OUTPATIENT
Start: 2019-05-01 | End: 2019-05-02 | Stop reason: HOSPADM

## 2019-05-01 RX ORDER — HALOPERIDOL 2 MG/1
2 TABLET ORAL EVERY 8 HOURS PRN
Status: CANCELLED | OUTPATIENT
Start: 2019-05-01

## 2019-05-01 RX ORDER — OLANZAPINE 10 MG/1
10 TABLET ORAL
Status: ON HOLD | COMMUNITY
End: 2019-05-06 | Stop reason: CLARIF

## 2019-05-01 RX ORDER — MAGNESIUM HYDROXIDE/ALUMINUM HYDROXICE/SIMETHICONE 120; 1200; 1200 MG/30ML; MG/30ML; MG/30ML
30 SUSPENSION ORAL EVERY 4 HOURS PRN
Status: CANCELLED | OUTPATIENT
Start: 2019-05-01

## 2019-05-01 RX ORDER — AZITHROMYCIN 250 MG/1
500 TABLET, FILM COATED ORAL ONCE
Status: COMPLETED | OUTPATIENT
Start: 2019-05-01 | End: 2019-05-01

## 2019-05-01 RX ORDER — HYDROXYZINE HYDROCHLORIDE 25 MG/1
25 TABLET, FILM COATED ORAL EVERY 6 HOURS PRN
Status: CANCELLED | OUTPATIENT
Start: 2019-05-01

## 2019-05-01 RX ORDER — HALOPERIDOL 0.5 MG/1
2 TABLET ORAL EVERY 8 HOURS PRN
Status: DISCONTINUED | OUTPATIENT
Start: 2019-05-01 | End: 2019-05-02 | Stop reason: HOSPADM

## 2019-05-01 RX ORDER — ACETAMINOPHEN 325 MG/1
650 TABLET ORAL EVERY 4 HOURS PRN
Status: CANCELLED | OUTPATIENT
Start: 2019-05-01

## 2019-05-01 RX ORDER — ACETAMINOPHEN 325 MG/1
650 TABLET ORAL ONCE
Status: COMPLETED | OUTPATIENT
Start: 2019-05-01 | End: 2019-05-01

## 2019-05-01 RX ORDER — TOPIRAMATE 100 MG/1
25 TABLET, FILM COATED ORAL DAILY
Status: ON HOLD | COMMUNITY
End: 2019-05-06 | Stop reason: CLARIF

## 2019-05-01 RX ORDER — BENZTROPINE MESYLATE 1 MG/1
1 TABLET ORAL EVERY 6 HOURS PRN
Status: CANCELLED | OUTPATIENT
Start: 2019-05-01

## 2019-05-01 RX ORDER — RISPERIDONE 1 MG/1
1 TABLET, ORALLY DISINTEGRATING ORAL EVERY 8 HOURS PRN
Status: CANCELLED | OUTPATIENT
Start: 2019-05-01

## 2019-05-01 RX ORDER — HALOPERIDOL 5 MG/ML
2 INJECTION INTRAMUSCULAR EVERY 8 HOURS PRN
Status: DISCONTINUED | OUTPATIENT
Start: 2019-05-01 | End: 2019-05-02 | Stop reason: HOSPADM

## 2019-05-01 RX ADMIN — SODIUM CHLORIDE 1000 ML: 0.9 INJECTION, SOLUTION INTRAVENOUS at 16:05

## 2019-05-01 RX ADMIN — SODIUM CHLORIDE 1000 ML: 9 INJECTION, SOLUTION INTRAVENOUS at 13:34

## 2019-05-01 RX ADMIN — ALBUTEROL SULFATE 5 MG: 2.5 SOLUTION RESPIRATORY (INHALATION) at 14:22

## 2019-05-01 RX ADMIN — AZITHROMYCIN 500 MG: 250 TABLET, FILM COATED ORAL at 14:19

## 2019-05-01 RX ADMIN — ALBUTEROL SULFATE 5 MG: 2.5 SOLUTION RESPIRATORY (INHALATION) at 19:25

## 2019-05-01 RX ADMIN — ACETAMINOPHEN 650 MG: 325 TABLET ORAL at 22:44

## 2019-05-02 ENCOUNTER — HOSPITAL ENCOUNTER (INPATIENT)
Facility: HOSPITAL | Age: 64
LOS: 3 days | DRG: 720 | End: 2019-05-05
Attending: FAMILY MEDICINE | Admitting: FAMILY MEDICINE
Payer: COMMERCIAL

## 2019-05-02 ENCOUNTER — APPOINTMENT (INPATIENT)
Dept: CT IMAGING | Facility: HOSPITAL | Age: 64
DRG: 720 | End: 2019-05-02
Payer: COMMERCIAL

## 2019-05-02 VITALS
TEMPERATURE: 97.6 F | HEIGHT: 64 IN | OXYGEN SATURATION: 95 % | BODY MASS INDEX: 25.26 KG/M2 | HEART RATE: 76 BPM | WEIGHT: 147.98 LBS | DIASTOLIC BLOOD PRESSURE: 59 MMHG | SYSTOLIC BLOOD PRESSURE: 110 MMHG | RESPIRATION RATE: 18 BRPM

## 2019-05-02 DIAGNOSIS — F31.9 BIPOLAR DISORDER (HCC): Primary | ICD-10-CM

## 2019-05-02 DIAGNOSIS — J44.9 COPD WITH ASTHMA (HCC): ICD-10-CM

## 2019-05-02 LAB
ALBUMIN SERPL BCP-MCNC: 3 G/DL (ref 3–5.2)
ALP SERPL-CCNC: 78 U/L (ref 43–122)
ALT SERPL W P-5'-P-CCNC: 21 U/L (ref 9–52)
ANION GAP SERPL CALCULATED.3IONS-SCNC: 7 MMOL/L (ref 5–14)
AST SERPL W P-5'-P-CCNC: 22 U/L (ref 14–36)
ATRIAL RATE: 120 BPM
ATRIAL RATE: 84 BPM
ATRIAL RATE: 88 BPM
BASOPHILS # BLD AUTO: 0.1 THOUSANDS/ΜL (ref 0–0.1)
BASOPHILS NFR BLD AUTO: 1 % (ref 0–1)
BILIRUB SERPL-MCNC: 0.4 MG/DL
BUN SERPL-MCNC: 10 MG/DL (ref 5–25)
CALCIUM SERPL-MCNC: 8.6 MG/DL (ref 8.4–10.2)
CHLORIDE SERPL-SCNC: 106 MMOL/L (ref 97–108)
CHOLEST SERPL-MCNC: 131 MG/DL
CO2 SERPL-SCNC: 25 MMOL/L (ref 22–30)
CREAT SERPL-MCNC: 0.51 MG/DL (ref 0.6–1.2)
EOSINOPHIL # BLD AUTO: 0.1 THOUSAND/ΜL (ref 0–0.4)
EOSINOPHIL NFR BLD AUTO: 1 % (ref 0–6)
ERYTHROCYTE [DISTWIDTH] IN BLOOD BY AUTOMATED COUNT: 14.2 %
GFR SERPL CREATININE-BSD FRML MDRD: 104 ML/MIN/1.73SQ M
GLUCOSE P FAST SERPL-MCNC: 75 MG/DL (ref 70–99)
GLUCOSE SERPL-MCNC: 112 MG/DL (ref 65–140)
GLUCOSE SERPL-MCNC: 75 MG/DL (ref 70–99)
HCT VFR BLD AUTO: 36.9 % (ref 36–46)
HDLC SERPL-MCNC: 42 MG/DL (ref 40–59)
HGB BLD-MCNC: 11.9 G/DL (ref 12–16)
LDLC SERPL CALC-MCNC: 76 MG/DL
LYMPHOCYTES # BLD AUTO: 2.3 THOUSANDS/ΜL (ref 0.5–4)
LYMPHOCYTES NFR BLD AUTO: 16 % (ref 25–45)
MCH RBC QN AUTO: 31.6 PG (ref 26–34)
MCHC RBC AUTO-ENTMCNC: 32.1 G/DL (ref 31–36)
MCV RBC AUTO: 99 FL (ref 80–100)
MONOCYTES # BLD AUTO: 1.3 THOUSAND/ΜL (ref 0.2–0.9)
MONOCYTES NFR BLD AUTO: 9 % (ref 1–10)
NEUTROPHILS # BLD AUTO: 10.8 THOUSANDS/ΜL (ref 1.8–7.8)
NEUTS SEG NFR BLD AUTO: 74 % (ref 45–65)
NONHDLC SERPL-MCNC: 89 MG/DL
P AXIS: 74 DEGREES
P AXIS: 78 DEGREES
P AXIS: 80 DEGREES
PLATELET # BLD AUTO: 131 THOUSANDS/UL (ref 150–450)
PLATELET # BLD AUTO: 208 THOUSANDS/UL (ref 150–450)
PLATELET BLD QL SMEAR: ABNORMAL
PLATELET CLUMP BLD QL SMEAR: SLIGHT
PMV BLD AUTO: 10.6 FL (ref 8.9–12.7)
POTASSIUM SERPL-SCNC: 3.3 MMOL/L (ref 3.6–5)
PR INTERVAL: 144 MS
PR INTERVAL: 146 MS
PR INTERVAL: 150 MS
PROCALCITONIN SERPL-MCNC: 0.3 NG/ML
PROT SERPL-MCNC: 5.7 G/DL (ref 5.9–8.4)
QRS AXIS: -39 DEGREES
QRS AXIS: -47 DEGREES
QRS AXIS: -73 DEGREES
QRSD INTERVAL: 86 MS
QRSD INTERVAL: 90 MS
QRSD INTERVAL: 96 MS
QT INTERVAL: 326 MS
QT INTERVAL: 372 MS
QT INTERVAL: 400 MS
QTC INTERVAL: 439 MS
QTC INTERVAL: 460 MS
QTC INTERVAL: 484 MS
RBC # BLD AUTO: 3.75 MILLION/UL (ref 4–5.2)
RBC MORPH BLD: ABNORMAL
RPR SER QL: NORMAL
SODIUM SERPL-SCNC: 138 MMOL/L (ref 137–147)
T WAVE AXIS: 48 DEGREES
T WAVE AXIS: 63 DEGREES
T WAVE AXIS: 68 DEGREES
THEOPHYLLINE SERPL-MCNC: 2.2 UG/ML (ref 10–20)
TRIGL SERPL-MCNC: 63 MG/DL
TSH SERPL DL<=0.05 MIU/L-ACNC: 4.81 UIU/ML (ref 0.47–4.68)
VENTRICULAR RATE: 120 BPM
VENTRICULAR RATE: 84 BPM
VENTRICULAR RATE: 88 BPM
WBC # BLD AUTO: 14.7 THOUSAND/UL (ref 4.5–11)

## 2019-05-02 PROCEDURE — 94760 N-INVAS EAR/PLS OXIMETRY 1: CPT

## 2019-05-02 PROCEDURE — 93005 ELECTROCARDIOGRAM TRACING: CPT

## 2019-05-02 PROCEDURE — 84443 ASSAY THYROID STIM HORMONE: CPT | Performed by: PSYCHIATRY & NEUROLOGY

## 2019-05-02 PROCEDURE — 93010 ELECTROCARDIOGRAM REPORT: CPT | Performed by: INTERNAL MEDICINE

## 2019-05-02 PROCEDURE — 87081 CULTURE SCREEN ONLY: CPT | Performed by: PSYCHIATRY & NEUROLOGY

## 2019-05-02 PROCEDURE — 80061 LIPID PANEL: CPT | Performed by: PSYCHIATRY & NEUROLOGY

## 2019-05-02 PROCEDURE — 80198 ASSAY OF THEOPHYLLINE: CPT | Performed by: INTERNAL MEDICINE

## 2019-05-02 PROCEDURE — 85049 AUTOMATED PLATELET COUNT: CPT | Performed by: INTERNAL MEDICINE

## 2019-05-02 PROCEDURE — 82948 REAGENT STRIP/BLOOD GLUCOSE: CPT

## 2019-05-02 PROCEDURE — 84145 PROCALCITONIN (PCT): CPT | Performed by: INTERNAL MEDICINE

## 2019-05-02 PROCEDURE — 99223 1ST HOSP IP/OBS HIGH 75: CPT | Performed by: FAMILY MEDICINE

## 2019-05-02 PROCEDURE — 94640 AIRWAY INHALATION TREATMENT: CPT

## 2019-05-02 PROCEDURE — 94664 DEMO&/EVAL PT USE INHALER: CPT

## 2019-05-02 PROCEDURE — 86592 SYPHILIS TEST NON-TREP QUAL: CPT | Performed by: PSYCHIATRY & NEUROLOGY

## 2019-05-02 PROCEDURE — 85025 COMPLETE CBC W/AUTO DIFF WBC: CPT | Performed by: PSYCHIATRY & NEUROLOGY

## 2019-05-02 PROCEDURE — 99253 IP/OBS CNSLTJ NEW/EST LOW 45: CPT | Performed by: INTERNAL MEDICINE

## 2019-05-02 PROCEDURE — 80053 COMPREHEN METABOLIC PANEL: CPT | Performed by: PSYCHIATRY & NEUROLOGY

## 2019-05-02 RX ORDER — AZITHROMYCIN 250 MG/1
500 TABLET, FILM COATED ORAL EVERY 24 HOURS
Status: DISCONTINUED | OUTPATIENT
Start: 2019-05-02 | End: 2019-05-05 | Stop reason: HOSPADM

## 2019-05-02 RX ORDER — HYDROXYZINE HYDROCHLORIDE 25 MG/1
25 TABLET, FILM COATED ORAL EVERY 6 HOURS PRN
Status: CANCELLED | OUTPATIENT
Start: 2019-05-02

## 2019-05-02 RX ORDER — PANTOPRAZOLE SODIUM 40 MG/1
40 TABLET, DELAYED RELEASE ORAL
Status: CANCELLED | OUTPATIENT
Start: 2019-05-03

## 2019-05-02 RX ORDER — ALBUTEROL SULFATE 90 UG/1
2 AEROSOL, METERED RESPIRATORY (INHALATION)
Status: DISCONTINUED | OUTPATIENT
Start: 2019-05-02 | End: 2019-05-02 | Stop reason: HOSPADM

## 2019-05-02 RX ORDER — BENZONATATE 100 MG/1
100 CAPSULE ORAL 3 TIMES DAILY PRN
Status: CANCELLED | OUTPATIENT
Start: 2019-05-02

## 2019-05-02 RX ORDER — AZITHROMYCIN 250 MG/1
500 TABLET, FILM COATED ORAL EVERY 24 HOURS
Status: DISCONTINUED | OUTPATIENT
Start: 2019-05-02 | End: 2019-05-02 | Stop reason: HOSPADM

## 2019-05-02 RX ORDER — LEVALBUTEROL INHALATION SOLUTION 0.63 MG/3ML
0.63 SOLUTION RESPIRATORY (INHALATION) EVERY 6 HOURS PRN
Status: DISCONTINUED | OUTPATIENT
Start: 2019-05-02 | End: 2019-05-02

## 2019-05-02 RX ORDER — GUAIFENESIN 600 MG
600 TABLET, EXTENDED RELEASE 12 HR ORAL EVERY 12 HOURS SCHEDULED
Status: DISCONTINUED | OUTPATIENT
Start: 2019-05-02 | End: 2019-05-02 | Stop reason: HOSPADM

## 2019-05-02 RX ORDER — FLUTICASONE FUROATE AND VILANTEROL 200; 25 UG/1; UG/1
1 POWDER RESPIRATORY (INHALATION)
Status: DISCONTINUED | OUTPATIENT
Start: 2019-05-03 | End: 2019-05-05

## 2019-05-02 RX ORDER — AZITHROMYCIN 250 MG/1
500 TABLET, FILM COATED ORAL EVERY 24 HOURS
Status: CANCELLED | OUTPATIENT
Start: 2019-05-02

## 2019-05-02 RX ORDER — ACETAMINOPHEN 325 MG/1
650 TABLET ORAL EVERY 6 HOURS PRN
Status: DISCONTINUED | OUTPATIENT
Start: 2019-05-02 | End: 2019-05-05 | Stop reason: HOSPADM

## 2019-05-02 RX ORDER — FLUTICASONE FUROATE AND VILANTEROL 200; 25 UG/1; UG/1
1 POWDER RESPIRATORY (INHALATION)
Status: CANCELLED | OUTPATIENT
Start: 2019-05-03

## 2019-05-02 RX ORDER — HALOPERIDOL 2 MG/ML
2 SOLUTION ORAL EVERY 8 HOURS PRN
Status: DISCONTINUED | OUTPATIENT
Start: 2019-05-02 | End: 2019-05-03

## 2019-05-02 RX ORDER — TRAZODONE HYDROCHLORIDE 50 MG/1
25 TABLET ORAL
Status: CANCELLED | OUTPATIENT
Start: 2019-05-02

## 2019-05-02 RX ORDER — ACETAMINOPHEN 325 MG/1
650 TABLET ORAL EVERY 4 HOURS PRN
Status: DISCONTINUED | OUTPATIENT
Start: 2019-05-02 | End: 2019-05-05 | Stop reason: HOSPADM

## 2019-05-02 RX ORDER — SODIUM CHLORIDE FOR INHALATION 0.9 %
3 VIAL, NEBULIZER (ML) INHALATION EVERY 6 HOURS PRN
Status: CANCELLED | OUTPATIENT
Start: 2019-05-02

## 2019-05-02 RX ORDER — HYDROXYZINE HYDROCHLORIDE 25 MG/1
25 TABLET, FILM COATED ORAL EVERY 6 HOURS PRN
Status: DISCONTINUED | OUTPATIENT
Start: 2019-05-02 | End: 2019-05-05 | Stop reason: HOSPADM

## 2019-05-02 RX ORDER — GUAIFENESIN 600 MG
600 TABLET, EXTENDED RELEASE 12 HR ORAL EVERY 12 HOURS SCHEDULED
Status: CANCELLED | OUTPATIENT
Start: 2019-05-02

## 2019-05-02 RX ORDER — FLUTICASONE FUROATE AND VILANTEROL 200; 25 UG/1; UG/1
1 POWDER RESPIRATORY (INHALATION)
Status: DISCONTINUED | OUTPATIENT
Start: 2019-05-02 | End: 2019-05-02 | Stop reason: HOSPADM

## 2019-05-02 RX ORDER — LEVOTHYROXINE SODIUM 0.12 MG/1
125 TABLET ORAL
Status: DISCONTINUED | OUTPATIENT
Start: 2019-05-03 | End: 2019-05-05 | Stop reason: HOSPADM

## 2019-05-02 RX ORDER — VANCOMYCIN HYDROCHLORIDE 1 G/200ML
15 INJECTION, SOLUTION INTRAVENOUS EVERY 12 HOURS
Status: DISCONTINUED | OUTPATIENT
Start: 2019-05-02 | End: 2019-05-05 | Stop reason: DRUGHIGH

## 2019-05-02 RX ORDER — BENZONATATE 100 MG/1
100 CAPSULE ORAL 3 TIMES DAILY PRN
Status: DISCONTINUED | OUTPATIENT
Start: 2019-05-02 | End: 2019-05-05 | Stop reason: HOSPADM

## 2019-05-02 RX ORDER — BUDESONIDE 0.5 MG/2ML
0.5 INHALANT ORAL
Status: DISCONTINUED | OUTPATIENT
Start: 2019-05-02 | End: 2019-05-05

## 2019-05-02 RX ORDER — SODIUM CHLORIDE FOR INHALATION 0.9 %
3 VIAL, NEBULIZER (ML) INHALATION EVERY 6 HOURS PRN
Status: DISCONTINUED | OUTPATIENT
Start: 2019-05-02 | End: 2019-05-05

## 2019-05-02 RX ORDER — TOPIRAMATE 25 MG/1
25 TABLET ORAL DAILY
Status: DISCONTINUED | OUTPATIENT
Start: 2019-05-03 | End: 2019-05-05 | Stop reason: HOSPADM

## 2019-05-02 RX ORDER — VANCOMYCIN HYDROCHLORIDE 1 G/200ML
15 INJECTION, SOLUTION INTRAVENOUS EVERY 12 HOURS
Status: CANCELLED | OUTPATIENT
Start: 2019-05-02

## 2019-05-02 RX ORDER — MAGNESIUM HYDROXIDE/ALUMINUM HYDROXICE/SIMETHICONE 120; 1200; 1200 MG/30ML; MG/30ML; MG/30ML
30 SUSPENSION ORAL EVERY 4 HOURS PRN
Status: CANCELLED | OUTPATIENT
Start: 2019-05-02

## 2019-05-02 RX ORDER — PANTOPRAZOLE SODIUM 40 MG/1
40 TABLET, DELAYED RELEASE ORAL
Status: DISCONTINUED | OUTPATIENT
Start: 2019-05-02 | End: 2019-05-02 | Stop reason: HOSPADM

## 2019-05-02 RX ORDER — ACETAMINOPHEN 325 MG/1
650 TABLET ORAL EVERY 6 HOURS PRN
Status: CANCELLED | OUTPATIENT
Start: 2019-05-02

## 2019-05-02 RX ORDER — LEVOTHYROXINE SODIUM 0.12 MG/1
125 TABLET ORAL
Status: DISCONTINUED | OUTPATIENT
Start: 2019-05-02 | End: 2019-05-02 | Stop reason: HOSPADM

## 2019-05-02 RX ORDER — HALOPERIDOL 5 MG/ML
2 INJECTION INTRAMUSCULAR EVERY 8 HOURS PRN
Status: DISCONTINUED | OUTPATIENT
Start: 2019-05-02 | End: 2019-05-03

## 2019-05-02 RX ORDER — RISPERIDONE 1 MG/1
1 TABLET, ORALLY DISINTEGRATING ORAL EVERY 8 HOURS PRN
Status: DISCONTINUED | OUTPATIENT
Start: 2019-05-02 | End: 2019-05-05 | Stop reason: HOSPADM

## 2019-05-02 RX ORDER — SODIUM CHLORIDE FOR INHALATION 0.9 %
3 VIAL, NEBULIZER (ML) INHALATION
Status: DISCONTINUED | OUTPATIENT
Start: 2019-05-02 | End: 2019-05-02

## 2019-05-02 RX ORDER — ACETAMINOPHEN 325 MG/1
650 TABLET ORAL EVERY 4 HOURS PRN
Status: CANCELLED | OUTPATIENT
Start: 2019-05-02

## 2019-05-02 RX ORDER — HALOPERIDOL 5 MG/ML
2 INJECTION INTRAMUSCULAR EVERY 8 HOURS PRN
Status: CANCELLED | OUTPATIENT
Start: 2019-05-02

## 2019-05-02 RX ORDER — PANTOPRAZOLE SODIUM 40 MG/1
40 TABLET, DELAYED RELEASE ORAL
Status: DISCONTINUED | OUTPATIENT
Start: 2019-05-03 | End: 2019-05-05 | Stop reason: HOSPADM

## 2019-05-02 RX ORDER — LEVOTHYROXINE SODIUM 0.12 MG/1
125 TABLET ORAL
Status: CANCELLED | OUTPATIENT
Start: 2019-05-03

## 2019-05-02 RX ORDER — VANCOMYCIN HYDROCHLORIDE 1 G/200ML
15 INJECTION, SOLUTION INTRAVENOUS EVERY 12 HOURS
Status: DISCONTINUED | OUTPATIENT
Start: 2019-05-02 | End: 2019-05-02 | Stop reason: HOSPADM

## 2019-05-02 RX ORDER — BENZTROPINE MESYLATE 1 MG/1
1 TABLET ORAL EVERY 6 HOURS PRN
Status: CANCELLED | OUTPATIENT
Start: 2019-05-02

## 2019-05-02 RX ORDER — HALOPERIDOL 0.5 MG/1
2 TABLET ORAL EVERY 8 HOURS PRN
Status: CANCELLED | OUTPATIENT
Start: 2019-05-02

## 2019-05-02 RX ORDER — GUAIFENESIN 600 MG
600 TABLET, EXTENDED RELEASE 12 HR ORAL EVERY 12 HOURS SCHEDULED
Status: DISCONTINUED | OUTPATIENT
Start: 2019-05-02 | End: 2019-05-05 | Stop reason: HOSPADM

## 2019-05-02 RX ORDER — ACETAMINOPHEN 325 MG/1
650 TABLET ORAL EVERY 6 HOURS PRN
Status: DISCONTINUED | OUTPATIENT
Start: 2019-05-02 | End: 2019-05-02 | Stop reason: HOSPADM

## 2019-05-02 RX ORDER — BENZONATATE 100 MG/1
100 CAPSULE ORAL 3 TIMES DAILY PRN
Status: DISCONTINUED | OUTPATIENT
Start: 2019-05-02 | End: 2019-05-02 | Stop reason: HOSPADM

## 2019-05-02 RX ORDER — SODIUM CHLORIDE FOR INHALATION 0.9 %
3 VIAL, NEBULIZER (ML) INHALATION EVERY 6 HOURS PRN
Status: DISCONTINUED | OUTPATIENT
Start: 2019-05-02 | End: 2019-05-02 | Stop reason: HOSPADM

## 2019-05-02 RX ORDER — RISPERIDONE 1 MG/1
1 TABLET, ORALLY DISINTEGRATING ORAL EVERY 8 HOURS PRN
Status: CANCELLED | OUTPATIENT
Start: 2019-05-02

## 2019-05-02 RX ORDER — LEVALBUTEROL INHALATION SOLUTION 0.63 MG/3ML
0.63 SOLUTION RESPIRATORY (INHALATION) EVERY 6 HOURS PRN
Status: DISCONTINUED | OUTPATIENT
Start: 2019-05-02 | End: 2019-05-02 | Stop reason: HOSPADM

## 2019-05-02 RX ORDER — MAGNESIUM HYDROXIDE/ALUMINUM HYDROXICE/SIMETHICONE 120; 1200; 1200 MG/30ML; MG/30ML; MG/30ML
30 SUSPENSION ORAL EVERY 4 HOURS PRN
Status: DISCONTINUED | OUTPATIENT
Start: 2019-05-02 | End: 2019-05-05 | Stop reason: HOSPADM

## 2019-05-02 RX ORDER — BUDESONIDE 0.5 MG/2ML
0.5 INHALANT ORAL
Status: DISCONTINUED | OUTPATIENT
Start: 2019-05-02 | End: 2019-05-02

## 2019-05-02 RX ORDER — BENZTROPINE MESYLATE 1 MG/1
1 TABLET ORAL EVERY 6 HOURS PRN
Status: DISCONTINUED | OUTPATIENT
Start: 2019-05-02 | End: 2019-05-05 | Stop reason: HOSPADM

## 2019-05-02 RX ORDER — LEVALBUTEROL INHALATION SOLUTION 0.63 MG/3ML
0.63 SOLUTION RESPIRATORY (INHALATION) EVERY 6 HOURS PRN
Status: CANCELLED | OUTPATIENT
Start: 2019-05-02

## 2019-05-02 RX ORDER — TRAZODONE HYDROCHLORIDE 50 MG/1
25 TABLET ORAL
Status: DISCONTINUED | OUTPATIENT
Start: 2019-05-02 | End: 2019-05-05 | Stop reason: HOSPADM

## 2019-05-02 RX ORDER — TOPIRAMATE 25 MG/1
25 TABLET ORAL DAILY
Status: CANCELLED | OUTPATIENT
Start: 2019-05-03

## 2019-05-02 RX ORDER — ALBUTEROL SULFATE 90 UG/1
2 AEROSOL, METERED RESPIRATORY (INHALATION)
Status: DISCONTINUED | OUTPATIENT
Start: 2019-05-02 | End: 2019-05-02

## 2019-05-02 RX ORDER — TOPIRAMATE 25 MG/1
25 TABLET ORAL DAILY
Status: DISCONTINUED | OUTPATIENT
Start: 2019-05-02 | End: 2019-05-02 | Stop reason: HOSPADM

## 2019-05-02 RX ORDER — ALBUTEROL SULFATE 90 UG/1
2 AEROSOL, METERED RESPIRATORY (INHALATION)
Status: CANCELLED | OUTPATIENT
Start: 2019-05-02

## 2019-05-02 RX ADMIN — ALBUTEROL SULFATE 2 PUFF: 90 AEROSOL, METERED RESPIRATORY (INHALATION) at 08:08

## 2019-05-02 RX ADMIN — GUAIFENESIN 600 MG: 600 TABLET, EXTENDED RELEASE ORAL at 02:10

## 2019-05-02 RX ADMIN — THEOPHYLLINE ANHYDROUS 200 MG: 200 CAPSULE, EXTENDED RELEASE ORAL at 08:07

## 2019-05-02 RX ADMIN — ALBUTEROL SULFATE 2.5 MG: 2.5 SOLUTION RESPIRATORY (INHALATION) at 15:48

## 2019-05-02 RX ADMIN — FLUTICASONE FUROATE AND VILANTEROL TRIFENATATE 1 PUFF: 200; 25 POWDER RESPIRATORY (INHALATION) at 08:06

## 2019-05-02 RX ADMIN — VANCOMYCIN HYDROCHLORIDE 1000 MG: 1 INJECTION, SOLUTION INTRAVENOUS at 09:09

## 2019-05-02 RX ADMIN — BUDESONIDE 0.5 MG: 0.5 INHALANT RESPIRATORY (INHALATION) at 19:52

## 2019-05-02 RX ADMIN — SODIUM CHLORIDE 1000 MG: 900 INJECTION INTRAVENOUS at 08:01

## 2019-05-02 RX ADMIN — PANTOPRAZOLE SODIUM 40 MG: 40 TABLET, DELAYED RELEASE ORAL at 05:25

## 2019-05-02 RX ADMIN — ALBUTEROL SULFATE 2.5 MG: 2.5 SOLUTION RESPIRATORY (INHALATION) at 19:52

## 2019-05-02 RX ADMIN — GUAIFENESIN 600 MG: 600 TABLET, EXTENDED RELEASE ORAL at 20:15

## 2019-05-02 RX ADMIN — ISODIUM CHLORIDE 3 ML: 0.03 SOLUTION RESPIRATORY (INHALATION) at 15:48

## 2019-05-02 RX ADMIN — VANCOMYCIN HYDROCHLORIDE 1000 MG: 1 INJECTION, SOLUTION INTRAVENOUS at 21:43

## 2019-05-02 RX ADMIN — ALBUTEROL SULFATE 2.5 MG: 2.5 SOLUTION RESPIRATORY (INHALATION) at 02:23

## 2019-05-02 RX ADMIN — AZITHROMYCIN 500 MG: 250 TABLET, FILM COATED ORAL at 13:13

## 2019-05-02 RX ADMIN — LEVALBUTEROL HYDROCHLORIDE 0.63 MG: 0.63 SOLUTION RESPIRATORY (INHALATION) at 12:18

## 2019-05-02 RX ADMIN — LEVOTHYROXINE SODIUM 125 MCG: 125 TABLET ORAL at 05:25

## 2019-05-02 RX ADMIN — ISODIUM CHLORIDE 3 ML: 0.03 SOLUTION RESPIRATORY (INHALATION) at 02:23

## 2019-05-03 LAB
GLUCOSE SERPL-MCNC: 122 MG/DL (ref 65–140)
MRSA NOSE QL CULT: NORMAL

## 2019-05-03 PROCEDURE — 82948 REAGENT STRIP/BLOOD GLUCOSE: CPT

## 2019-05-03 PROCEDURE — 99232 SBSQ HOSP IP/OBS MODERATE 35: CPT | Performed by: FAMILY MEDICINE

## 2019-05-03 PROCEDURE — 94640 AIRWAY INHALATION TREATMENT: CPT

## 2019-05-03 PROCEDURE — 99253 IP/OBS CNSLTJ NEW/EST LOW 45: CPT | Performed by: STUDENT IN AN ORGANIZED HEALTH CARE EDUCATION/TRAINING PROGRAM

## 2019-05-03 PROCEDURE — 94760 N-INVAS EAR/PLS OXIMETRY 1: CPT

## 2019-05-03 RX ADMIN — PANTOPRAZOLE SODIUM 40 MG: 40 TABLET, DELAYED RELEASE ORAL at 05:20

## 2019-05-03 RX ADMIN — ALBUTEROL SULFATE 2.5 MG: 2.5 SOLUTION RESPIRATORY (INHALATION) at 20:15

## 2019-05-03 RX ADMIN — BUDESONIDE 0.5 MG: 0.5 INHALANT RESPIRATORY (INHALATION) at 20:15

## 2019-05-03 RX ADMIN — ALBUTEROL SULFATE 2.5 MG: 2.5 SOLUTION RESPIRATORY (INHALATION) at 07:40

## 2019-05-03 RX ADMIN — ALBUTEROL SULFATE 2.5 MG: 2.5 SOLUTION RESPIRATORY (INHALATION) at 03:21

## 2019-05-03 RX ADMIN — ISODIUM CHLORIDE 3 ML: 0.03 SOLUTION RESPIRATORY (INHALATION) at 03:21

## 2019-05-03 RX ADMIN — GUAIFENESIN 600 MG: 600 TABLET, EXTENDED RELEASE ORAL at 08:43

## 2019-05-03 RX ADMIN — BUDESONIDE 0.5 MG: 0.5 INHALANT RESPIRATORY (INHALATION) at 07:39

## 2019-05-03 RX ADMIN — LEVOTHYROXINE SODIUM 125 MCG: 125 TABLET ORAL at 05:19

## 2019-05-03 RX ADMIN — VANCOMYCIN HYDROCHLORIDE 1000 MG: 1 INJECTION, SOLUTION INTRAVENOUS at 08:43

## 2019-05-03 RX ADMIN — ALBUTEROL SULFATE 2.5 MG: 2.5 SOLUTION RESPIRATORY (INHALATION) at 17:21

## 2019-05-03 RX ADMIN — AZITHROMYCIN 500 MG: 250 TABLET, FILM COATED ORAL at 14:19

## 2019-05-03 RX ADMIN — ALBUTEROL SULFATE 2.5 MG: 2.5 SOLUTION RESPIRATORY (INHALATION) at 11:57

## 2019-05-03 RX ADMIN — TOPIRAMATE 25 MG: 25 TABLET, FILM COATED ORAL at 08:43

## 2019-05-03 RX ADMIN — THEOPHYLLINE ANHYDROUS 200 MG: 200 CAPSULE, EXTENDED RELEASE ORAL at 08:43

## 2019-05-03 RX ADMIN — VANCOMYCIN HYDROCHLORIDE 1000 MG: 1 INJECTION, SOLUTION INTRAVENOUS at 23:43

## 2019-05-04 LAB — VANCOMYCIN TROUGH SERPL-MCNC: 6.4 UG/ML (ref 10–20)

## 2019-05-04 PROCEDURE — 80202 ASSAY OF VANCOMYCIN: CPT | Performed by: FAMILY MEDICINE

## 2019-05-04 PROCEDURE — 94760 N-INVAS EAR/PLS OXIMETRY 1: CPT

## 2019-05-04 PROCEDURE — 94640 AIRWAY INHALATION TREATMENT: CPT

## 2019-05-04 PROCEDURE — 99232 SBSQ HOSP IP/OBS MODERATE 35: CPT | Performed by: FAMILY MEDICINE

## 2019-05-04 RX ADMIN — LEVOTHYROXINE SODIUM 125 MCG: 125 TABLET ORAL at 05:19

## 2019-05-04 RX ADMIN — ISODIUM CHLORIDE 3 ML: 0.03 SOLUTION RESPIRATORY (INHALATION) at 04:10

## 2019-05-04 RX ADMIN — ISODIUM CHLORIDE 3 ML: 0.03 SOLUTION RESPIRATORY (INHALATION) at 16:37

## 2019-05-04 RX ADMIN — ALBUTEROL SULFATE 2.5 MG: 2.5 SOLUTION RESPIRATORY (INHALATION) at 04:09

## 2019-05-04 RX ADMIN — PANTOPRAZOLE SODIUM 40 MG: 40 TABLET, DELAYED RELEASE ORAL at 05:19

## 2019-05-04 RX ADMIN — ALBUTEROL SULFATE 2.5 MG: 2.5 SOLUTION RESPIRATORY (INHALATION) at 19:34

## 2019-05-04 RX ADMIN — VANCOMYCIN HYDROCHLORIDE 1000 MG: 1 INJECTION, SOLUTION INTRAVENOUS at 19:15

## 2019-05-04 RX ADMIN — ISODIUM CHLORIDE 3 ML: 0.03 SOLUTION RESPIRATORY (INHALATION) at 12:51

## 2019-05-04 RX ADMIN — BUDESONIDE 0.5 MG: 0.5 INHALANT RESPIRATORY (INHALATION) at 19:33

## 2019-05-04 RX ADMIN — BUDESONIDE 0.5 MG: 0.5 INHALANT RESPIRATORY (INHALATION) at 07:27

## 2019-05-04 RX ADMIN — THEOPHYLLINE ANHYDROUS 200 MG: 200 CAPSULE, EXTENDED RELEASE ORAL at 08:53

## 2019-05-04 RX ADMIN — ALBUTEROL SULFATE 2.5 MG: 2.5 SOLUTION RESPIRATORY (INHALATION) at 07:27

## 2019-05-04 RX ADMIN — GUAIFENESIN 600 MG: 600 TABLET, EXTENDED RELEASE ORAL at 08:53

## 2019-05-04 RX ADMIN — FLUTICASONE FUROATE AND VILANTEROL TRIFENATATE 1 PUFF: 200; 25 POWDER RESPIRATORY (INHALATION) at 08:53

## 2019-05-04 RX ADMIN — AZITHROMYCIN 500 MG: 250 TABLET, FILM COATED ORAL at 17:21

## 2019-05-04 RX ADMIN — ACETAMINOPHEN 650 MG: 325 TABLET ORAL at 20:16

## 2019-05-04 RX ADMIN — TOPIRAMATE 25 MG: 25 TABLET, FILM COATED ORAL at 08:53

## 2019-05-04 RX ADMIN — ALBUTEROL SULFATE 2.5 MG: 2.5 SOLUTION RESPIRATORY (INHALATION) at 12:51

## 2019-05-04 RX ADMIN — GUAIFENESIN 600 MG: 600 TABLET, EXTENDED RELEASE ORAL at 20:17

## 2019-05-04 RX ADMIN — ALBUTEROL SULFATE 2.5 MG: 2.5 SOLUTION RESPIRATORY (INHALATION) at 16:37

## 2019-05-05 ENCOUNTER — HOSPITAL ENCOUNTER (INPATIENT)
Facility: HOSPITAL | Age: 64
LOS: 79 days | DRG: 750 | End: 2019-07-23
Attending: PSYCHIATRY & NEUROLOGY | Admitting: PSYCHIATRY & NEUROLOGY
Payer: COMMERCIAL

## 2019-05-05 ENCOUNTER — APPOINTMENT (INPATIENT)
Dept: RADIOLOGY | Facility: HOSPITAL | Age: 64
DRG: 720 | End: 2019-05-05
Payer: COMMERCIAL

## 2019-05-05 ENCOUNTER — DOCUMENTATION (OUTPATIENT)
Dept: MEDSURG UNIT | Facility: HOSPITAL | Age: 64
End: 2019-05-05

## 2019-05-05 VITALS
BODY MASS INDEX: 25.29 KG/M2 | HEART RATE: 81 BPM | OXYGEN SATURATION: 94 % | SYSTOLIC BLOOD PRESSURE: 106 MMHG | TEMPERATURE: 98.2 F | WEIGHT: 148.15 LBS | DIASTOLIC BLOOD PRESSURE: 55 MMHG | RESPIRATION RATE: 20 BRPM | HEIGHT: 64 IN

## 2019-05-05 DIAGNOSIS — F25.0 SCHIZOAFFECTIVE DISORDER, BIPOLAR TYPE (HCC): Primary | Chronic | ICD-10-CM

## 2019-05-05 DIAGNOSIS — J44.9 COPD WITH ASTHMA (HCC): ICD-10-CM

## 2019-05-05 DIAGNOSIS — R22.43 LOCALIZED SWELLING OF BOTH LOWER LEGS: ICD-10-CM

## 2019-05-05 DIAGNOSIS — E03.9 ACQUIRED HYPOTHYROIDISM: ICD-10-CM

## 2019-05-05 DIAGNOSIS — L60.2 LONG TOENAIL: ICD-10-CM

## 2019-05-05 DIAGNOSIS — K21.9 GASTROESOPHAGEAL REFLUX DISEASE WITHOUT ESOPHAGITIS: ICD-10-CM

## 2019-05-05 PROCEDURE — 71045 X-RAY EXAM CHEST 1 VIEW: CPT

## 2019-05-05 PROCEDURE — 94640 AIRWAY INHALATION TREATMENT: CPT

## 2019-05-05 PROCEDURE — 94760 N-INVAS EAR/PLS OXIMETRY 1: CPT

## 2019-05-05 PROCEDURE — 99239 HOSP IP/OBS DSCHRG MGMT >30: CPT | Performed by: FAMILY MEDICINE

## 2019-05-05 PROCEDURE — 99232 SBSQ HOSP IP/OBS MODERATE 35: CPT | Performed by: PSYCHIATRY & NEUROLOGY

## 2019-05-05 PROCEDURE — 99253 IP/OBS CNSLTJ NEW/EST LOW 45: CPT | Performed by: FAMILY MEDICINE

## 2019-05-05 RX ORDER — BENZTROPINE MESYLATE 1 MG/ML
1 INJECTION INTRAMUSCULAR; INTRAVENOUS EVERY 8 HOURS PRN
Status: DISCONTINUED | OUTPATIENT
Start: 2019-05-05 | End: 2019-07-01

## 2019-05-05 RX ORDER — ACETAMINOPHEN 325 MG/1
650 TABLET ORAL EVERY 6 HOURS PRN
Status: CANCELLED | OUTPATIENT
Start: 2019-05-05

## 2019-05-05 RX ORDER — RISPERIDONE 1 MG/1
1 TABLET, ORALLY DISINTEGRATING ORAL EVERY 8 HOURS PRN
Status: CANCELLED | OUTPATIENT
Start: 2019-05-05

## 2019-05-05 RX ORDER — HALOPERIDOL 5 MG/ML
2 INJECTION INTRAMUSCULAR EVERY 6 HOURS PRN
Status: DISCONTINUED | OUTPATIENT
Start: 2019-05-05 | End: 2019-06-05

## 2019-05-05 RX ORDER — LEVALBUTEROL INHALATION SOLUTION 0.63 MG/3ML
0.63 SOLUTION RESPIRATORY (INHALATION) EVERY 4 HOURS PRN
Status: DISCONTINUED | OUTPATIENT
Start: 2019-05-05 | End: 2019-05-05

## 2019-05-05 RX ORDER — LEVALBUTEROL INHALATION SOLUTION 0.63 MG/3ML
0.63 SOLUTION RESPIRATORY (INHALATION) EVERY 6 HOURS PRN
Status: DISCONTINUED | OUTPATIENT
Start: 2019-05-05 | End: 2019-05-06

## 2019-05-05 RX ORDER — HYDROXYZINE HYDROCHLORIDE 25 MG/1
25 TABLET, FILM COATED ORAL EVERY 6 HOURS PRN
Status: DISCONTINUED | OUTPATIENT
Start: 2019-05-05 | End: 2019-07-16

## 2019-05-05 RX ORDER — BENZTROPINE MESYLATE 1 MG/1
1 TABLET ORAL EVERY 6 HOURS PRN
Status: CANCELLED | OUTPATIENT
Start: 2019-05-05

## 2019-05-05 RX ORDER — GUAIFENESIN 600 MG
600 TABLET, EXTENDED RELEASE 12 HR ORAL EVERY 12 HOURS SCHEDULED
Status: CANCELLED | OUTPATIENT
Start: 2019-05-05

## 2019-05-05 RX ORDER — TRAZODONE HYDROCHLORIDE 50 MG/1
25 TABLET ORAL
Status: DISCONTINUED | OUTPATIENT
Start: 2019-05-05 | End: 2019-07-23 | Stop reason: HOSPADM

## 2019-05-05 RX ORDER — LITHIUM CARBONATE 300 MG/1
300 TABLET, FILM COATED, EXTENDED RELEASE ORAL EVERY 12 HOURS SCHEDULED
Status: DISCONTINUED | OUTPATIENT
Start: 2019-05-05 | End: 2019-05-05 | Stop reason: HOSPADM

## 2019-05-05 RX ORDER — AZITHROMYCIN 250 MG/1
500 TABLET, FILM COATED ORAL EVERY 24 HOURS
Status: DISCONTINUED | OUTPATIENT
Start: 2019-05-06 | End: 2019-05-06

## 2019-05-05 RX ORDER — HALOPERIDOL 1 MG/1
1 TABLET ORAL EVERY 6 HOURS PRN
Status: CANCELLED | OUTPATIENT
Start: 2019-05-05

## 2019-05-05 RX ORDER — FLUTICASONE FUROATE AND VILANTEROL 200; 25 UG/1; UG/1
1 POWDER RESPIRATORY (INHALATION) DAILY
Status: DISCONTINUED | OUTPATIENT
Start: 2019-05-06 | End: 2019-07-23 | Stop reason: HOSPADM

## 2019-05-05 RX ORDER — HYDROXYZINE HYDROCHLORIDE 25 MG/1
25 TABLET, FILM COATED ORAL EVERY 6 HOURS PRN
Status: CANCELLED | OUTPATIENT
Start: 2019-05-05

## 2019-05-05 RX ORDER — TOPIRAMATE 25 MG/1
25 TABLET ORAL DAILY
Status: DISCONTINUED | OUTPATIENT
Start: 2019-05-06 | End: 2019-05-06

## 2019-05-05 RX ORDER — LITHIUM CARBONATE 300 MG/1
300 TABLET, FILM COATED, EXTENDED RELEASE ORAL EVERY 12 HOURS SCHEDULED
Status: DISCONTINUED | OUTPATIENT
Start: 2019-05-05 | End: 2019-05-06

## 2019-05-05 RX ORDER — LITHIUM CARBONATE 300 MG/1
300 TABLET, FILM COATED, EXTENDED RELEASE ORAL EVERY 12 HOURS SCHEDULED
Status: CANCELLED | OUTPATIENT
Start: 2019-05-05

## 2019-05-05 RX ORDER — AZITHROMYCIN 250 MG/1
500 TABLET, FILM COATED ORAL EVERY 24 HOURS
Status: CANCELLED | OUTPATIENT
Start: 2019-05-06 | End: 2019-05-09

## 2019-05-05 RX ORDER — ACETAMINOPHEN 325 MG/1
325 TABLET ORAL EVERY 6 HOURS PRN
Status: CANCELLED | OUTPATIENT
Start: 2019-05-05

## 2019-05-05 RX ORDER — TRAZODONE HYDROCHLORIDE 50 MG/1
25 TABLET ORAL
Status: CANCELLED | OUTPATIENT
Start: 2019-05-05

## 2019-05-05 RX ORDER — TOPIRAMATE 25 MG/1
25 TABLET ORAL DAILY
Status: CANCELLED | OUTPATIENT
Start: 2019-05-06

## 2019-05-05 RX ORDER — BENZTROPINE MESYLATE 1 MG/ML
1 INJECTION INTRAMUSCULAR; INTRAVENOUS EVERY 8 HOURS PRN
Status: CANCELLED | OUTPATIENT
Start: 2019-05-05

## 2019-05-05 RX ORDER — MAGNESIUM HYDROXIDE/ALUMINUM HYDROXICE/SIMETHICONE 120; 1200; 1200 MG/30ML; MG/30ML; MG/30ML
15 SUSPENSION ORAL EVERY 4 HOURS PRN
Status: CANCELLED | OUTPATIENT
Start: 2019-05-05

## 2019-05-05 RX ORDER — BENZTROPINE MESYLATE 1 MG/1
1 TABLET ORAL EVERY 8 HOURS PRN
Status: DISCONTINUED | OUTPATIENT
Start: 2019-05-05 | End: 2019-07-16

## 2019-05-05 RX ORDER — MAGNESIUM HYDROXIDE/ALUMINUM HYDROXICE/SIMETHICONE 120; 1200; 1200 MG/30ML; MG/30ML; MG/30ML
30 SUSPENSION ORAL EVERY 4 HOURS PRN
Status: CANCELLED | OUTPATIENT
Start: 2019-05-05

## 2019-05-05 RX ORDER — ALBUTEROL SULFATE 90 UG/1
2 AEROSOL, METERED RESPIRATORY (INHALATION) 4 TIMES DAILY
Status: DISCONTINUED | OUTPATIENT
Start: 2019-05-05 | End: 2019-05-05 | Stop reason: HOSPADM

## 2019-05-05 RX ORDER — SODIUM CHLORIDE FOR INHALATION 0.9 %
3 VIAL, NEBULIZER (ML) INHALATION EVERY 6 HOURS PRN
Status: CANCELLED | OUTPATIENT
Start: 2019-05-05

## 2019-05-05 RX ORDER — RISPERIDONE 1 MG/1
1 TABLET, ORALLY DISINTEGRATING ORAL EVERY 8 HOURS PRN
Status: DISCONTINUED | OUTPATIENT
Start: 2019-05-05 | End: 2019-07-16

## 2019-05-05 RX ORDER — BENZONATATE 100 MG/1
100 CAPSULE ORAL 3 TIMES DAILY PRN
Status: DISCONTINUED | OUTPATIENT
Start: 2019-05-05 | End: 2019-07-23 | Stop reason: HOSPADM

## 2019-05-05 RX ORDER — LEVOTHYROXINE SODIUM 0.12 MG/1
125 TABLET ORAL
Status: DISCONTINUED | OUTPATIENT
Start: 2019-05-06 | End: 2019-07-23 | Stop reason: HOSPADM

## 2019-05-05 RX ORDER — MAGNESIUM HYDROXIDE/ALUMINUM HYDROXICE/SIMETHICONE 120; 1200; 1200 MG/30ML; MG/30ML; MG/30ML
15 SUSPENSION ORAL EVERY 4 HOURS PRN
Status: DISCONTINUED | OUTPATIENT
Start: 2019-05-05 | End: 2019-07-23 | Stop reason: HOSPADM

## 2019-05-05 RX ORDER — BENZONATATE 100 MG/1
100 CAPSULE ORAL 3 TIMES DAILY PRN
Status: CANCELLED | OUTPATIENT
Start: 2019-05-05

## 2019-05-05 RX ORDER — ACETAMINOPHEN 325 MG/1
650 TABLET ORAL EVERY 4 HOURS PRN
Status: CANCELLED | OUTPATIENT
Start: 2019-05-05

## 2019-05-05 RX ORDER — FLUTICASONE FUROATE AND VILANTEROL 200; 25 UG/1; UG/1
1 POWDER RESPIRATORY (INHALATION) DAILY
Status: DISCONTINUED | OUTPATIENT
Start: 2019-05-06 | End: 2019-05-05 | Stop reason: HOSPADM

## 2019-05-05 RX ORDER — PANTOPRAZOLE SODIUM 40 MG/1
40 TABLET, DELAYED RELEASE ORAL
Status: CANCELLED | OUTPATIENT
Start: 2019-05-06

## 2019-05-05 RX ORDER — PANTOPRAZOLE SODIUM 40 MG/1
40 TABLET, DELAYED RELEASE ORAL
Status: DISCONTINUED | OUTPATIENT
Start: 2019-05-06 | End: 2019-07-23 | Stop reason: HOSPADM

## 2019-05-05 RX ORDER — HALOPERIDOL 5 MG/ML
2 INJECTION INTRAMUSCULAR EVERY 6 HOURS PRN
Status: CANCELLED | OUTPATIENT
Start: 2019-05-05

## 2019-05-05 RX ORDER — ALBUTEROL SULFATE 90 UG/1
2 AEROSOL, METERED RESPIRATORY (INHALATION) EVERY 4 HOURS PRN
Status: DISCONTINUED | OUTPATIENT
Start: 2019-05-05 | End: 2019-05-05 | Stop reason: HOSPADM

## 2019-05-05 RX ORDER — ACETAMINOPHEN 325 MG/1
650 TABLET ORAL EVERY 6 HOURS PRN
Status: DISCONTINUED | OUTPATIENT
Start: 2019-05-05 | End: 2019-07-23 | Stop reason: HOSPADM

## 2019-05-05 RX ORDER — GUAIFENESIN 600 MG
600 TABLET, EXTENDED RELEASE 12 HR ORAL EVERY 12 HOURS SCHEDULED
Status: DISCONTINUED | OUTPATIENT
Start: 2019-05-05 | End: 2019-05-18

## 2019-05-05 RX ORDER — HALOPERIDOL 2 MG/ML
1 SOLUTION ORAL EVERY 6 HOURS PRN
Status: DISCONTINUED | OUTPATIENT
Start: 2019-05-05 | End: 2019-06-05

## 2019-05-05 RX ORDER — BUDESONIDE 0.5 MG/2ML
0.5 INHALANT ORAL
Status: CANCELLED | OUTPATIENT
Start: 2019-05-05

## 2019-05-05 RX ORDER — LEVOTHYROXINE SODIUM 0.12 MG/1
125 TABLET ORAL
Status: CANCELLED | OUTPATIENT
Start: 2019-05-06

## 2019-05-05 RX ORDER — BENZTROPINE MESYLATE 1 MG/1
1 TABLET ORAL EVERY 8 HOURS PRN
Status: CANCELLED | OUTPATIENT
Start: 2019-05-05

## 2019-05-05 RX ADMIN — THEOPHYLLINE ANHYDROUS 200 MG: 200 CAPSULE, EXTENDED RELEASE ORAL at 09:56

## 2019-05-05 RX ADMIN — TOPIRAMATE 25 MG: 25 TABLET, FILM COATED ORAL at 09:56

## 2019-05-05 RX ADMIN — BUDESONIDE 0.5 MG: 0.5 INHALANT RESPIRATORY (INHALATION) at 07:33

## 2019-05-05 RX ADMIN — ISODIUM CHLORIDE 3 ML: 0.03 SOLUTION RESPIRATORY (INHALATION) at 13:05

## 2019-05-05 RX ADMIN — GUAIFENESIN 600 MG: 600 TABLET, EXTENDED RELEASE ORAL at 09:55

## 2019-05-05 RX ADMIN — PANTOPRAZOLE SODIUM 40 MG: 40 TABLET, DELAYED RELEASE ORAL at 06:20

## 2019-05-05 RX ADMIN — ISODIUM CHLORIDE 3 ML: 0.03 SOLUTION RESPIRATORY (INHALATION) at 03:12

## 2019-05-05 RX ADMIN — ALBUTEROL SULFATE 2.5 MG: 2.5 SOLUTION RESPIRATORY (INHALATION) at 18:37

## 2019-05-05 RX ADMIN — ALBUTEROL SULFATE 2.5 MG: 2.5 SOLUTION RESPIRATORY (INHALATION) at 13:05

## 2019-05-05 RX ADMIN — VANCOMYCIN HYDROCHLORIDE 1000 MG: 1 INJECTION, SOLUTION INTRAVENOUS at 07:14

## 2019-05-05 RX ADMIN — ALBUTEROL SULFATE 2.5 MG: 2.5 SOLUTION RESPIRATORY (INHALATION) at 07:33

## 2019-05-05 RX ADMIN — ISODIUM CHLORIDE 3 ML: 0.03 SOLUTION RESPIRATORY (INHALATION) at 07:33

## 2019-05-05 RX ADMIN — BUDESONIDE 0.5 MG: 0.5 INHALANT RESPIRATORY (INHALATION) at 18:37

## 2019-05-05 RX ADMIN — LEVOTHYROXINE SODIUM 125 MCG: 125 TABLET ORAL at 06:20

## 2019-05-06 LAB
BACTERIA BLD CULT: NORMAL
BACTERIA BLD CULT: NORMAL

## 2019-05-06 PROCEDURE — 94640 AIRWAY INHALATION TREATMENT: CPT

## 2019-05-06 PROCEDURE — 94664 DEMO&/EVAL PT USE INHALER: CPT

## 2019-05-06 PROCEDURE — 99222 1ST HOSP IP/OBS MODERATE 55: CPT | Performed by: PSYCHIATRY & NEUROLOGY

## 2019-05-06 PROCEDURE — 94760 N-INVAS EAR/PLS OXIMETRY 1: CPT

## 2019-05-06 RX ORDER — ALBUTEROL SULFATE 90 UG/1
2 AEROSOL, METERED RESPIRATORY (INHALATION) EVERY 6 HOURS PRN
Status: DISCONTINUED | OUTPATIENT
Start: 2019-05-06 | End: 2019-05-08

## 2019-05-06 RX ORDER — PALIPERIDONE 3 MG/1
3 TABLET, EXTENDED RELEASE ORAL
Status: DISCONTINUED | OUTPATIENT
Start: 2019-05-06 | End: 2019-05-07

## 2019-05-06 RX ADMIN — FLUTICASONE FUROATE AND VILANTEROL TRIFENATATE 1 PUFF: 200; 25 POWDER RESPIRATORY (INHALATION) at 08:59

## 2019-05-06 RX ADMIN — GUAIFENESIN 600 MG: 600 TABLET, EXTENDED RELEASE ORAL at 08:59

## 2019-05-06 RX ADMIN — AZITHROMYCIN 500 MG: 250 TABLET, FILM COATED ORAL at 15:30

## 2019-05-06 RX ADMIN — THEOPHYLLINE ANHYDROUS 200 MG: 200 CAPSULE, EXTENDED RELEASE ORAL at 08:59

## 2019-05-06 RX ADMIN — LEVALBUTEROL HYDROCHLORIDE 0.63 MG: 0.63 SOLUTION RESPIRATORY (INHALATION) at 01:05

## 2019-05-06 RX ADMIN — PANTOPRAZOLE SODIUM 40 MG: 40 TABLET, DELAYED RELEASE ORAL at 06:27

## 2019-05-06 RX ADMIN — ALBUTEROL SULFATE 2 PUFF: 90 AEROSOL, METERED RESPIRATORY (INHALATION) at 19:41

## 2019-05-06 RX ADMIN — LEVOTHYROXINE SODIUM 125 MCG: 125 TABLET ORAL at 06:27

## 2019-05-06 RX ADMIN — ALBUTEROL SULFATE 2 PUFF: 90 AEROSOL, METERED RESPIRATORY (INHALATION) at 13:33

## 2019-05-06 NOTE — CONSULTS
Inpatient Consultation - Shanti Wang    Patient Information: Arian Jonas 66 y o  female MRN: [id number]  Unit/Bed#: Stacey Ruelas 307-67 Encounter: [id number]  PCP: Hyacinth Melvin MD  Date of Admission:  5/5/2019  Date of Consultation: 05/05/19  Requesting Physician: Georgia Obregon MD    Reason For Consultation:   Medical management     Assessment/Plan:    * Schizoaffective disorder, bipolar type [facility])  Assessment & Plan  - Management per psychiatry     Pneumonia of right upper lobe due to infectious organism [facility])  Assessment & Plan  - Chest xray on admission showed right upper and middle lobe opacities consistent with pneumonia with repeat CXR from today showing some improvement  - Patient received one does of Aztreonam in the Emergency department and 4 days of Vancomycin which was discontinued today  - Currently on last day of Azithromycin 500mg PO    - Continue breathing treatments  - Mucinex and tessalon perles for cough   - Continue to monitor vitals     Gastroesophageal reflux disease without esophagitis  Assessment & Plan  - Stable   - Continue protonix 40 mg daily with mylanta PRN     Acquired hypothyroidism  Assessment & Plan  - Continue levothyroxine 125 mcg daily     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[facility] )  Assessment & Plan  - Longstanding history of COPD with asthma and is chronically on oxygen therapy   - Currently back to her baseline of 2-3 L of oxygen continuous  - Reports shortness of breath however no signs of respiratory distress on physical exam   - Continue Breo Ellipta and Theophylline daily with Xopnex inhalers as needed  VTE Prophylaxis: Enoxaparin (Lovenox)  / Ambulation     Recommendations for Discharge:  · Per psychiatry     Counseling / Coordination of Care Time: 30 minutes  Greater than 50% of total time spent on patient counseling and coordination of care      History of Present Illness:    Arian Jonas is a 66 y o  female with a past medical history of schizoaffective disorder, COPD, asthma, hypothyroidism and GERD who is originally presented to the Emergency department 5/1/19 with a chief complaint of shortness of breath and was subsequently found to have pneumonia on chest xray  Patient was subsequently admitted to the medical floor and given a course of antibiotics namely Vancomycin and Azithromycin in conjunction with breathing treatments for wheezing  Psychiatry was consulted during her stay due to increasing paranoia and a 302 was performed  Patient was deemed medically stable for inpatient psychiatric treatment and was transferred to the psychiatry service 5/5/19  Patient states that she has some shortness of breath however was did not appear to be in respiratory distress at the time of assessment and was able to communicate in full sentences  Patient also stated that she had productive cough which was white in color but denied any hemoptysis  Patient however denied any chest pain, fever, chills, nausea, vomiting, abdominal pain or diarrhea  We are consulted for medical management  Review of Systems:    Review of Systems   Constitutional: Negative for activity change, appetite change, chills and fever  HENT: Negative for congestion, ear pain, rhinorrhea, sinus pain and sore throat  Eyes: Negative for pain and visual disturbance  Respiratory: Positive for cough and shortness of breath  Negative for chest tightness and wheezing  Cardiovascular: Negative for chest pain, palpitations and leg swelling  Gastrointestinal: Negative for abdominal pain, constipation, diarrhea, nausea and vomiting  Endocrine: Negative for cold intolerance, heat intolerance and polyuria  Genitourinary: Negative for difficulty urinating, dysuria and pelvic pain  Musculoskeletal: Negative for arthralgias, back pain, gait problem and myalgias  Skin: Negative for color change, pallor, rash and wound     Neurological: Negative for dizziness, weakness, light-headedness, numbness and headaches  Hematological: Negative for adenopathy  Does not bruise/bleed easily  Psychiatric/Behavioral: Negative for agitation, behavioral problems, confusion, decreased concentration and dysphoric mood  The patient is not nervous/anxious  Past Medical and Surgical History:   Past Medical History:   Diagnosis Date    Acid reflux     Anxiety     Asthma     Bipolar 1 disorder (HCC)     Chronic respiratory failure (HCC)     Compression fracture of fourth lumbar vertebra (HCC)     COPD (chronic obstructive pulmonary disease) (HCC)     Depression     GERD (gastroesophageal reflux disease)     History of home oxygen therapy     Hypothyroidism     Lipoma of upper extremity     Schizoaffective disorder (HCC)     Substance abuse ([facility])     Nicotine    Thoracic compression fracture (HCC)     Ventral hernia      No past surgical history on file  Meds/Allergies: Allergies: Allergies   Allergen Reactions    Peanut-Containing Drug Products Anaphylaxis    Shellfish-Derived Products Anaphylaxis    Antihistamines, Chlorpheniramine-Type     Aspirin     Atrovent [Ipratropium]     Elavil [Amitriptyline]     Iodine      Other reaction(s): Unknown Reaction    Levaquin [Levofloxacin]     Novocain [Procaine]     Penicillins      Able to tolerate azithromycin and vancomycin    Prolixin [Fluphenazine]     Sulfa Antibiotics Other (See Comments)     Unknown Zithromax-Tight Throat     Prior to Admission Medications   Prescriptions Last Dose Informant Patient Reported? Taking?    EPINEPHrine (EPIPEN JR) 0 15 mg/0 3 mL SOAJ   No No   Sig: Inject 0 3 mL (0 15 mg total) into a muscle once as needed for anaphylaxis As needed for allergic reaction   OLANZapine (ZyPREXA) 10 mg tablet   Yes No   Sig: Take 10 mg by mouth daily at bedtime   OXYGEN-HELIUM IN   Yes No   Sig: Inhale 3 L    acetaminophen (TYLENOL) 325 mg tablet   No No   Sig: Take 2 tablets (650 mg total) by mouth every 6 (six) hours as needed for mild pain   albuterol (PROVENTIL HFA,VENTOLIN HFA) 90 mcg/act inhaler   No No   Sig: Inhale 2 puffs every 4 (four) hours as needed for wheezing   albuterol (VENTOLIN HFA) 90 mcg/act inhaler   No No   Sig: Inhale 2 puffs every 4 (four) hours as needed for wheezing   fluticasone-salmeterol (ADVAIR DISKUS, WIXELA INHUB) 250-50 mcg/dose inhaler   No No   Sig: Inhale 1 puff 2 (two) times a day Rinse mouth after use     levothyroxine 125 mcg tablet   No No   Sig: Take 1 tablet (125 mcg total) by mouth daily   pantoprazole (PROTONIX) 20 mg tablet   No No   Sig: Take 1 tablet (20 mg total) by mouth daily   theophylline (JEF-24) 200 MG 24 hr capsule   No No   Sig: Take 1 capsule (200 mg total) by mouth daily   topiramate (TOPAMAX) 100 mg tablet   Yes No   Sig: Take 25 mg by mouth daily      Facility-Administered Medications: None     Social History:     Social History     Socioeconomic History    Marital status: Single     Spouse name: Not on file    Number of children: Not on file    Years of education: Not on file    Highest education level: Not on file   Occupational History    Not on file   Social Needs    Financial resource strain: Not on file    Food insecurity:     Worry: Not on file     Inability: Not on file    Transportation needs:     Medical: Not on file     Non-medical: Not on file   Tobacco Use    Smoking status: Current Every Day Smoker     Packs/day: 0 20     Types: Cigarettes    Smokeless tobacco: Never Used   Substance and Sexual Activity    Alcohol use: Never     Frequency: Never     Binge frequency: Never    Drug use: No    Sexual activity: Not on file   Lifestyle    Physical activity:     Days per week: Not on file     Minutes per session: Not on file    Stress: Not on file   Relationships    Social connections:     Talks on phone: Not on file     Gets together: Not on file     Attends Church service: Not on file     Active member of club or organization: Not on file     Attends meetings of clubs or organizations: Not on file     Relationship status: Not on file    Intimate partner violence:     Fear of current or ex partner: Not on file     Emotionally abused: Not on file     Physically abused: Not on file     Forced sexual activity: Not on file   Other Topics Concern    Not on file   Social History Narrative    Not on file     Family History:  Family History   Problem Relation Age of Onset    Arthritis Mother        Physical Exam:     Vitals:   Blood Pressure: 114/70 (05/05/19 1926)  Pulse: 87 (05/05/19 1926)  Temperature: 98 °F (36 7 °C) (05/05/19 1926)  Temp Source: Temporal (05/05/19 1926)  Respirations: 16 (05/05/19 1926)  Height: 5' 4" (162 6 cm) (05/05/19 1926)  Weight - Scale: 67 2 kg (148 lb 2 4 oz) (05/05/19 1926)  SpO2: 96 % (05/05/19 1926)    Physical Exam   Constitutional: She is oriented to person, place, and time  She appears well-developed and well-nourished  No distress  HENT:   Head: Normocephalic and atraumatic  Eyes: Pupils are equal, round, and reactive to light  Conjunctivae and EOM are normal  Right eye exhibits no discharge  Left eye exhibits no discharge  No scleral icterus  Neck: Normal range of motion  Neck supple  Cardiovascular: Normal rate, regular rhythm, normal heart sounds and intact distal pulses  No murmur heard  Pulmonary/Chest: Effort normal and breath sounds normal  No respiratory distress  She has no wheezes  Abdominal: Soft  Bowel sounds are normal  She exhibits no distension  There is no tenderness  Musculoskeletal: Normal range of motion  She exhibits no edema, tenderness or deformity  Neurological: She is alert and oriented to person, place, and time  She has normal reflexes  Skin: Skin is warm  No rash noted  She is not diaphoretic  No erythema  No pallor  Psychiatric: She has a normal mood and affect   Her behavior is normal  Judgment and thought content normal        Additional Data:     Lab Results: I have personally reviewed pertinent reports  Results from last 7 days   Lab Units 05/02/19  0524   WBC Thousand/uL 14 70*   HEMOGLOBIN g/dL 11 9*   HEMATOCRIT % 36 9   PLATELETS Thousands/uL 131*  208   NEUTROS PCT % 74*   LYMPHS PCT % 16*   MONOS PCT % 9   EOS PCT % 1     Results from last 7 days   Lab Units 05/02/19  0524   POTASSIUM mmol/L 3 3*   CHLORIDE mmol/L 106   CO2 mmol/L 25   BUN mg/dL 10   CREATININE mg/dL 0 51*   CALCIUM mg/dL 8 6   ALK PHOS U/L 78   ALT U/L 21   AST U/L 22     Results from last 7 days   Lab Units 05/01/19  1318   INR  1 00       Imaging: I have personally reviewed pertinent reports  Xr Chest Portable    Result Date: 4/7/2019  Narrative: CHEST INDICATION:   cp  COMPARISON:  January 17, 2019  EXAM PERFORMED/VIEWS:  XR CHEST PORTABLE FINDINGS: Cardiomediastinal silhouette appears unremarkable  Hyperinflation of the lungs with flattening of the hemidiaphragms, similar to the prior exam, in keeping with underlying COPD  No focal consolidation, pleural effusion or pneumothorax  Osseous structures appear within normal limits for patient age  Impression: COPD changes  No acute cardiopulmonary disease  Workstation performed: [id number]     Xr Chest 2 Views    Result Date: 5/1/2019  Narrative: CHEST INDICATION:   dyspnea  COMPARISON:  4/12/2019  EXAM PERFORMED/VIEWS:  XR CHEST PA & LATERAL FINDINGS:  Large hiatal hernia is present  Cardiomediastinal silhouette appears unremarkable  Right upper and middle lobe opacities are seen consistent with pneumonia  Osseous structures appear within normal limits for patient age  Impression: Right upper and middle lobe opacities consistent with pneumonia  Large hiatal hernia  Workstation performed: [id number]     Xr Chest 2 Views    Result Date: 4/12/2019  Narrative: CHEST INDICATION:   Shortness of breath  History of COPD  COMPARISON:  4/7/2019 on 7/20/2019 EXAM PERFORMED/VIEWS:  XR CHEST PA & LATERAL FINDINGS: Heart shadow appears unremarkable  Atherosclerotic vascular calcifications are noted  Right midlung scarring redemonstrated  Pulmonary hyperinflation noted  Surgical clips noted in the right upper quadrant, likely related to prior cholecystectomy  Thoracic vertebral compression deformities are noted, unchanged when compared to prior examination  Impression: Emphysematous changes are noted  No focal consolidation, pleural effusion, or pneumothorax  Workstation performed: [id number]       ** Please Note: This note has been constructed using a voice recognition system   John Bhatt MD  05/05/19  9:56 PM

## 2019-05-06 NOTE — ASSESSMENT & PLAN NOTE
- Chest xray on admission showed right upper and middle lobe opacities consistent with pneumonia with repeat CXR from today showing some improvement  - Patient received one does of Aztreonam in the Emergency department and 4 days of Vancomycin which was discontinued today     - Currently on last day of Azithromycin 500mg PO    - Continue breathing treatments  - Mucinex and tessalon perles for cough   - Continue to monitor vitals

## 2019-05-06 NOTE — ASSESSMENT & PLAN NOTE
- Longstanding history of COPD with asthma and is chronically on oxygen therapy   - Currently back to her baseline of 2-3 L of oxygen continuous  - Reports shortness of breath however no signs of respiratory distress on physical exam   - Continue Breo Ellipta and Theophylline daily with Xopnex inhalers as needed

## 2019-05-06 NOTE — PROGRESS NOTES
Awake and irritable on approach this morning  Demending and needs limit set  Pt out in dinning room for breakfast with encouragement,refusing to use walker  Ambulating with stand by assist Pt refused Lithium and Topamax after education and encouragement provided, stated " I already said ,I am not taking them"  Isolative to her room after breakfast,declined groups despite encouragement  Family practice,[facility] SHANE was asked if we need to rechecked CBC and BMP due to last done on 5/2 was abnormal  Will continue to monitor closely and provide support

## 2019-05-06 NOTE — NURSING NOTE
Patient has poor insight  She has inappropriate behavior and is using the call bell just to check if it is working  Patient is suspicious and said she was " not getting 3 L of oxygen" despite writer reassures patient is getting the right amount ordered  Patient is saying she is " going to stand up and "put the oxygen up herself  Redirected patient again  However, she is not satisfied and is complaining " You are not taking care of me, this is my life"    During room check patient's oxygen level was up to 5 liters  Patient was able to get up and turn the oxygen level to 5 L  Patient reoriented and asked again to not change the settings, and she is not allowed to touch it  However, she is asking writer to leave to room  Patient is saying  Jefferson Felton is friendly with the respiratory therapist and we both are not giving her proper care  No further complaints   Will continue to monitor per protocol

## 2019-05-06 NOTE — PROGRESS NOTES
Pt is 65 yo female 36 of Dr Ludin Ballard and AFP admitted for paranoia, refusing meds, imaging and tx interventions  Pt transfered from [address] [place] [address] after being treated for Right upper lobe pneumonia  Medical hx includes asthma, COPD, pneumonia, schizoaffective d/o, chronic respiratory failure, emphysema, hypothyroidism, L4 compression fx  Pt denied SI, A/V hallucinations and stated she has a lot to be paranoid about after living at [facility] (group home)  Pt cooperative with adm process  VSS  O2 in place via nc @ 3 liters/min, POX 96% with O2  Ambulates with standby  Denied pain  A + O x 4  Pt very articulate with poor insight and judgement  Pt refused mucinex and lithium stating she would get esophageal strangulation  Requested neb tx, Respiratory Therapist reported that pt had last tx at 1900  Monitored for safety and support
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
## 2019-05-06 NOTE — NURSING NOTE
Patient requesting Xopenex  Nebulization  Last treatment was administered at 19:00 according to respiratory, the next one could be administered at 1:00 am  Patient vital signs /75 , heart rate 84, O2 95%, nasal cannula 2 , no wheezing noticed  Patient was informed about next treatment time   Will continue to monitor

## 2019-05-06 NOTE — CASE MANAGEMENT
CM met with patient 1:1 to complete psychosocial assessment  Patient admitted to [facility] 5/5/19 on 302 commitment with [facility]  Pt  Has 303 hearing 5/7/19 at [address] [place] [address]  Patient has history of schizoaffective disorder, bipolar type  Per report, patient has paranoia, has been refusing meds, and was transferred from [address] [place] [address]- had upper R lobe pneumonia  Pt has long history chronic mental illness  She previously was admitted to  this past January  At time of assessment, patient was laying in her bed alert and oriented x3 receiving O2  Patient agreeable to meet with CM and cooperative throughout meeting  Patient spent ample time complaining about her recent stay at [facility] and is asking if Dr Sandra Solomon can be her psychiatrist while she's here  Patient had various complaints about her previous living situations and shared brief history of her asthma and stated she started on O2 about 10 years ago  Patient is known to have hx of smoking cigarettes  Patient stated she does not want to return to [facility] because it was 'deplorable' condition and shared various complaints  Patient has no plan at this time as to where she will reside following her inpatient stay, and states she has no supports or family who would help  Patient shared she has one son who she's close to, but recently heard unfortunate news about him, but did not share more  Patient stated she doesn't have regular contact with her son and doesn't know where he lives, but that he has a spouse and family of his own and works for [employer]  Patient refused to sign ROIs  Patient did sign [employer] release  Patient has [facility] team [phone] but refused signing release  Patient also has PCP Dr Sejal Porras [phone]   Patient stated her previous psychiatric provider was at [facility] on [address], and years ago she was in LTC program  CM reviewed patient's treatment plan and patient did not want to sign, stating she would prefer to wait and wants to review it while meeting with treatment team tomorrow  Patient has no insight into her mental illness and stated she has no issues with her thoughts or coping while reviewing the treatment plan  Patient denies SI/HI both current and any history; denies current and history of substance abuse, and states she is of sound mind  UDS was negative  CM plans to meet with patient again to see if she is open to signing releases; to meet as treatment team to review tx plan and sign; to begin d/c planning and reach out to [facility]  CM met with patient in afternoon to review 303 rights  Patient verbalized understanding and stated she intends on going to the hearing tomorrow

## 2019-05-07 PROCEDURE — 99232 SBSQ HOSP IP/OBS MODERATE 35: CPT | Performed by: PSYCHIATRY & NEUROLOGY

## 2019-05-07 PROCEDURE — 97166 OT EVAL MOD COMPLEX 45 MIN: CPT

## 2019-05-07 PROCEDURE — 97150 GROUP THERAPEUTIC PROCEDURES: CPT

## 2019-05-07 RX ORDER — PALIPERIDONE 3 MG/1
3 TABLET, EXTENDED RELEASE ORAL DAILY
Status: DISCONTINUED | OUTPATIENT
Start: 2019-05-07 | End: 2019-05-08

## 2019-05-07 RX ADMIN — ALBUTEROL SULFATE 2 PUFF: 90 AEROSOL, METERED RESPIRATORY (INHALATION) at 14:47

## 2019-05-07 RX ADMIN — FLUTICASONE FUROATE AND VILANTEROL TRIFENATATE 1 PUFF: 200; 25 POWDER RESPIRATORY (INHALATION) at 08:41

## 2019-05-07 RX ADMIN — LEVOTHYROXINE SODIUM 125 MCG: 125 TABLET ORAL at 06:05

## 2019-05-07 RX ADMIN — ALBUTEROL SULFATE 2 PUFF: 90 AEROSOL, METERED RESPIRATORY (INHALATION) at 21:24

## 2019-05-07 RX ADMIN — PALIPERIDONE 3 MG: 3 TABLET, FILM COATED, EXTENDED RELEASE ORAL at 14:31

## 2019-05-07 RX ADMIN — THEOPHYLLINE ANHYDROUS 200 MG: 200 CAPSULE, EXTENDED RELEASE ORAL at 08:40

## 2019-05-07 RX ADMIN — ALBUTEROL SULFATE 2 PUFF: 90 AEROSOL, METERED RESPIRATORY (INHALATION) at 01:22

## 2019-05-07 RX ADMIN — PANTOPRAZOLE SODIUM 40 MG: 40 TABLET, DELAYED RELEASE ORAL at 06:05

## 2019-05-07 RX ADMIN — ALBUTEROL SULFATE 2 PUFF: 90 AEROSOL, METERED RESPIRATORY (INHALATION) at 08:44

## 2019-05-07 RX ADMIN — GUAIFENESIN 600 MG: 600 TABLET, EXTENDED RELEASE ORAL at 08:40

## 2019-05-07 NOTE — OCCUPATIONAL THERAPY NOTE
Occupational Therapy  I attempted to interview Rafal Andrew for OT evaluation  She was still finishing her breakfast and requested to talk with me later in the day  I will approach her at a later time when she is better able to engage in the interview process    Vonda Andrews, OT

## 2019-05-07 NOTE — PROGRESS NOTES
Patient continues at 3L/minute O2 via nc  She is fixated on not being without oxygen even for a moment  She continues to scrutenize staff and their motivations, thinking "we are doing something wrong, or against her or inept" She attempts to dictate treatment but is superficially pleasant and seemingly cooperative  She requires limits as can be staff splitting  She wants to see all her medication before taking them   She denies S/HI

## 2019-05-07 NOTE — OCCUPATIONAL THERAPY NOTE
Occupational Therapy Evaluation      Niyah Ortiz    5/7/2019    Patient Active Problem List   Diagnosis    Sepsis [facility]    Pneumonia of right upper lobe due to infectious organism ([facility] )    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    Tobacco use disorder, continuous    Bipolar disorder ([facility]    Left hip pain    Lactic acidosis    Hypokalemia    Hypomagnesemia    Compression fracture of L4 lumbar vertebra    Thoracic compression fracture (HCC)    Ventral hernia    Parapneumonic effusion    Acute on chronic respiratory failure with hypoxia (HCC)    Chronic respiratory failure (HCC)    Hypophosphatemia    Elevated MCV    Compression deformity of vertebra    Sch[facility]oaffective disorder, bipolar type ([facility]    Acquired hypothyroidism    Gastroesophageal reflux disease without esophagitis    Abnormal CT of the chest    Excessive cerumen in left ear canal    Lipoma of right upper extremity    Polydipsia    Localized swelling of both lower legs       Past Medical History:   Diagnosis Date    Acid reflux     Anxiety     Asthma     Bipolar 1 disorder (HCC)     Chronic pain disorder     Chronic respiratory failure (HCC)     Compression fracture of fourth lumbar vertebra ([facility]     COPD (chronic obstructive pulmonary disease) (HCC)     Depression     GERD (gastroesophageal reflux disease)     History of home oxygen therapy     Hypothyroidism     Lipoma of upper extremity     Psychiatric illness     [facility]oaffective disorder ([facility]     Substance abuse ([facility] )     Nicotine    Thoracic compression fracture ([facility] )     Ventral hernia        No past surgical history on file       05/07/19 0906   Note Type   Note type Eval/Treat   Restrictions/Precautions   Other Precautions O2  (behavioral health 15 minute checks)   Pain Assessment   Pain Assessment FLACC  (she would not engage in interview)   Pain Rating: FLACC (Rest) - Face 0   Pain Rating: FLACC (Rest) - Legs 0   Pain Rating: FLACC (Rest) - Activity 0   Pain Rating: FLACC (Rest) - Cry 0   Pain Rating: FLACC (Rest) - Consolability 0   Score: FLACC (Rest) 0   Home Living   Type of Home Assisted living  (had been at [address], reported to not want to return there)   Additional Comments Carlos Yin would not engage in this interview process  She stated that she did not want to talk until she found her "foundation"  She was noted to have been living at [address] [facility], per  documentation she has been indicating that she does not want to return there  Prior Function   Level of [place] Needs assistance with IADLs   Lives With Facility staff  (at [facility])   Receives Help From Other (Comment)  (staff at [facility]; she was also noted to have [facility] involvement)   ADL Assistance Needs assistance  (received assistance on unit, did not share if needed prior)   IADLs Needs assistance   Falls in the last 6 months   (did not share)   Vocational Other (Comment)  (she is disabled)   Joceline Giordano was limited in her willingness to participate in interview  She stated that she wanted to find out her foundation first before talking  She was superficially pleasant  Information was collected via observation and reiveiw of her record  she had been living in assisted living facility, [facility], does not want to return  She was noted to have initially been admitted medically, presented to ED on 5/1/19 with SOB, and she was found to have pneumonia  When on the medical floor, she was noted to have increasing paranoia  She has a long history of severe and persistent mental illness, chronically low functioning  While on medical floor, she was illogicalm paranoid, frequently refusing important workups  (reported to have limited supports, family involvement)   Lifestyle   Autonomy She had been living at [facility]  She did not further engage during interview   She is noted to have a long behavioral health history   Reciprocal Relationships per  documentation, she has very limited supports   Intrinsic Gratification She refused to share what her hobbies are   Psychosocial   Psychosocial (WDL) X   Patient Behaviors/Mood Pleasant; Non-compliant; Uncooperative; Other (Comment)  (appeared suspicious, guarded)   Ability to Express Feelings Refuses to express  (at time of interview)   Ability to Express Needs Able to express   Ability to Express Thoughts Refuses to express  (at time of interview)   Ability to Understand Others Usually understands   Subjective   Subjective she stated, "I don't want to talk until I find out my foundation " (re, what is going on with her at this time as this relates to hospitalization and living situation)   ADL   Additional Comments She does feed herself independently when her tray was brought to her in this environment  She would not further participate  Per daily cares documentation, she is noted to have received minimal assistance in hygiene needs  Transfers   Additional Comments She has been transferring with supervision/ minimal assistance, largely due to being on continuous O2 and requiring assistance for tank   Functional Mobility   Additional Comments She has been ambulating with minimal asssistance on the living unit in part due to need to have O2 tank with her  Activity Tolerance   Activity Tolerance Other (Comment)  (she was tolerant of short interview)   RUE Assessment   RUE Assessment   (not formally tested, appears within basic functional limits)   LUE Assessment   LUE Assessment   (not formally tested, appears within basic functional limits)   Hand Function   Gross Motor Coordination Functional  (per observation)   Fine Motor Coordination Functional  (per observation)   Vision-Basic Assessment   Current Vision Other (Comment)  (was not wearing glasses, was able to see her food to eat it)   Cognition   Overall Cognitive Status Unable to assess   Arousal/Participation Alert; Responsive; Uncooperative   Attention Other (Comment)  (was attentive to brief interactions) Orientation Level Oriented X4  (per her record, she is noted to be oriented X4)   Following Commands   (she declined any task involvement; )   Comments She has carried out simple directions on occasion per observation   Assessment   Limitation Decreased ADL status; Decreased high-level ADLs;Mood limitation  (guarded, limited coping and life management skills)   Prognosis Fair   Assessment Pt is a 66 y o  female seen for OT evaluation s/p admit to [facility] on 5/5/2019 w/ Schizoaffective disorder, bipolar type ([facility] )  Comorbidities affecting pt's functional performance at time of assessment include: COPD and hx of pnuemonia right upper lobe, acquired hypothroidism, GERD  Personal factors affecting pt at time of IE include:limited home support, behavioral pattern, limited insight into deficits, compliance, decreased initiation and engagement , health management , environment and guardedness, decreased coping skills, decreased life management skills  Prior to admission, pt was living in assisted living ([facility]), was on medical admission prior this behavioral health admission  Upon evaluation: Pt requires treatment with consideration of the following deficits impacting occupational performance: impaired problem solving, decreased safety awareness, impaired interpersonal skills, decreased coping skills and decreased cooperation with her treatment regime  Pt to benefit from continued skilled OT tx while in the hospital to address deficits as defined above and maximize level of functional independence w ADL's and functional mobility  Occupational Performance areas to address include: socialization, health maintenance, social participation and coping skills instruction, life management instruction  From OT standpoint, recommendation at time of d/c would be to return to supervised living environment ([facility] , [facility]) with community supports when stable and if accepting of this       Goals   Patient Goals She did not share any goals at this time other than to know what direction things will be going for her (i e , as this pertains to her foundation)   LTG Time Frame   (30 days)   Long Term Goal #1 Attend/ participate in 2 OT groups offered on the unit to offset admitting behaviors/ symptoms  Long Term Goal #2 Identify and explore strategies to promote improved functioning and wellness  Long Term Goal #3 Identify 3 positive qualities of self  #4 Consistently cooperate with caregivers and her treatment team in order to promote optimum health and wellness  #5 Consistently utilize positive coping skills to deal with daily life demands without interference from resistive and/or paranoid behaviors 100% of the time  Plan   Treatment Interventions ADL retraining; Endurance training;Continued evaluation; Activityengagement  (coping skills training, life management training)   Goal Expiration Date 06/06/19   Treatment Day 1   OT Frequency 5x/wk   Dede Rodrigez, OT

## 2019-05-07 NOTE — OCCUPATIONAL THERAPY NOTE
Occupational Therapy Group Treatment Note      Ashleigh Rutherford    5/7/2019    Patient Active Problem List   Diagnosis    Sepsis [facility]    Pneumonia of right upper lobe due to infectious organism ([address] )    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[address] )    Tobacco use disorder, continuous    Bipolar disorder ([address] )    Left hip pain    Lactic acidosis    Hypokalemia    Hypomagnesemia    Compression fracture of L4 lumbar vertebra    Thoracic compression fracture (HCC)    Ventral hernia    Parapneumonic effusion    Acute on chronic respiratory failure with hypoxia (HCC)    Chronic respiratory failure (HCC)    Hypophosphatemia    Elevated MCV    Compression deformity of vertebra    Schizoaffective disorder, bipolar type ([facility] )    Acquired hypothyroidism    Gastroesophageal reflux disease without esophagitis    Abnormal CT of the chest    Excessive cerumen in left ear canal    Lipoma of right upper extremity    Polydipsia    Localized swelling of both lower legs       Past Medical History:   Diagnosis Date    Acid reflux     Anxiety     Asthma     Bipolar 1 disorder (HCC)     Chronic pain disorder     Chronic respiratory failure (HCC)     Compression fracture of fourth lumbar vertebra ([address] )     COPD (chronic obstructive pulmonary disease) (HCC)     Depression     GERD (gastroesophageal reflux disease)     History of home oxygen therapy     Hypothyroidism     Lipoma of upper extremity     Psychiatric illness     Schizoaffective disorder ([address] )     Substance abuse ([facility] )     Nicotine    Thoracic compression fracture ([facility]     Ventral hernia        No past surgical history on file  05/07/19 1010   Assessment   Assessment Shannan Marroquin was present for approximately 25-30 minutes of this morning OT Life Management session  She was quiet, somewhat attentive  She was provided with a handout on the topic, "Letting Go of the Need to Control"   She did not say anything during group discussion but did at times have eye contact  She was present as the group reviewed different choices individuals have to help improve the quality of relationships  She was passively supportive of group process, she did request assistance to leave the group early  Progress has been guarded towards her goals  Encourage positive interactions with others and investment in group process as she is receptive to prompts  Plan   Treatment Interventions ADL retraining; Endurance training;Continued evaluation; Activityengagement  (coping skills instruction, life management instruction)   Goal Expiration Date 06/06/19   Treatment Day 1   OT Frequency 5x/wk   Adrián Mittal, OT

## 2019-05-07 NOTE — CASE MANAGEMENT
CL called [facility] at [phone] to speak with director Zenobia Pascual about patient and establish contact with CL Pascual requesting CL to call around noon when she can speak further  Will follow up then

## 2019-05-07 NOTE — PLAN OF CARE
Problem: OCCUPATIONAL THERAPY ADULT  Goal: Performs self-care activities at highest level of function for planned discharge setting  See evaluation for individualized goals  Description  Treatment Interventions: ADL retraining, Endurance training, Continued evaluation, Activityengagement(coping skills training, life management training)          See flowsheet documentation for full assessment, interventions and recommendations  Outcome: Not Progressing  Note:   Limitation: Decreased ADL status, Decreased high-level ADLs, Mood limitation(guarded, limited coping and life management skills)  Prognosis: Fair  Assessment: Pt is a 66 y o  female seen for OT evaluation s/p admit to [facility] on 5/5/2019 w/ Schizoaffective disorder, bipolar type ([facility] [address] )  Comorbidities affecting pt's functional performance at time of assessment include: COPD and hx of pnuemonia right upper lobe, acquired hypothroidism, GERD  Personal factors affecting pt at time of IE include:limited home support, behavioral pattern, limited insight into deficits, compliance, decreased initiation and engagement , health management , environment and guardedness, decreased coping skills, decreased life management skills  Prior to admission, pt was living in assisted living ([place]), was on medical admission prior this behavioral health admission  Upon evaluation: Pt requires treatment with consideration of the following deficits impacting occupational performance: impaired problem solving, decreased safety awareness, impaired interpersonal skills, decreased coping skills and decreased cooperation with her treatment regime  Pt to benefit from continued skilled OT tx while in the hospital to address deficits as defined above and maximize level of functional independence w ADL's and functional mobility   Occupational Performance areas to address include: socialization, health maintenance, social participation and coping skills instruction, life management instruction  From OT standpoint, recommendation at time of d/c would be to return to supervised living environment ([facility]) with community supports when stable and if accepting of this

## 2019-05-07 NOTE — PROGRESS NOTES
Pt discussed at treatment rounds today, pt had hearinf today and is now 303, will be assess by Dr Sheryle Minor for possible medication over objection order  ,

## 2019-05-07 NOTE — PROGRESS NOTES
Currently observed in bed with eyes closed and respirations noted, appears to be resting calmly  O2 @ 3   L  NC  Monitored on Q 7 minute checks

## 2019-05-07 NOTE — PROGRESS NOTES
Awake and more pleasant on approach this morning  Up for breakfast in dinning room, she stay for part of OT group then returned back to her room despite encouragement  Pt uses her recent pneumonia for excuse to stay away from unit activities  Poor insight into her mental illness  Pt refused shower despite encouragement  Med compliant,exept Lovenox,stated" I don't need it", education provided  No distress noted  Will continue to monitor closely

## 2019-05-07 NOTE — PROGRESS NOTES
Requested and given Albuterol inhaler 2 puffs @ 0122   Encouraged to use her incentive spirometer   Continues on O2 at 3 L via NC  SPO2 94 % on 3 L

## 2019-05-07 NOTE — CASE MANAGEMENT
CM spoke with VIKI Duarte ([facility]) and patient, upon his visit to assess pt today  Patient verbalized not wanting Act team involved anymore  Patient is still stating she will not return to [address]  When CL inquired with patient about her resources and where she will live following d/c, patient avoided conversation and changed topic several times  Patient also took Invega 24 hr tablet 3mg this afternoon for the first time  Patient stated she ate lunch  She had multiple complaints during this visit and continues to have no insight regarding her mental health  CL placed follow up call to Navneet and left VM

## 2019-05-07 NOTE — PROGRESS NOTES
Pt is asking for her Albuterol inhaler to be change to every 4 hours PRN,family practice was paged  Pulse Ox 96% on 3L O2, no distress noted  Will continue to monitor closely

## 2019-05-07 NOTE — PROGRESS NOTES
[address] [facility] Geriatric Psychiatry  Psychiatrists  Progress Note    Patient Name: Evelin Morrow  MRN: [id number]  DOS: 05/07/19     Chief Complaint:  paranoid delusions    Interval History: Per staff, patient has been responding better to limit setting, has been more visible  She still demonstrates poor self care  Has not showered  No overt delusions elicited today  Splitting behavior is ongoing  Denies AH or SI  Declined paliperidone overnight  She states she spoke with Dr Ludin Ballard today who suggested a ROSALES and she thinks this is a good idea  Per Dr Ludin Ballard who assessed patient to explore medication over objection, patient was not open to the suggested treatment during conversation today  Medication noncompliant  Adverse effects of medications: N/A      Mental Status Exam [Per above +]  Appearance: very disheveled  Behavior: calm, superficially engaged  Speech: wnl  Mood: reported as depressed  Affect: congruent, stable, constricted  Thought process: tangential, overinclusive  Thought content: no overt delusions elicited today; denies SI/HI  Perceptual disturbances: denies AH/VH  Cognition: oriented to all domains     Insight: poor  Judgement: poor      Current Facility-Administered Medications:     acetaminophen (TYLENOL) tablet 650 mg, 650 mg, Oral, Q6H PRN, Darci Lee MD    albuterol (PROVENTIL HFA,VENTOLIN HFA) inhaler 2 puff, 2 puff, Inhalation, Q6H PRN, Yeison Zhao MD, 2 puff at 05/07/19 1447    aluminum-magnesium hydroxide-simethicone (MYLANTA) 200-200-20 mg/5 mL oral suspension 15 mL, 15 mL, Oral, Q4H PRN, Elysia So MD    benzonatate (TESSALON PERLES) capsule 100 mg, 100 mg, Oral, TID PRN, Darci Lee MD    benztropine (COGENTIN) injection 1 mg, 1 mg, Intramuscular, Q8H PRN, Elysia So MD    benztropine (COGENTIN) tablet 1 mg, 1 mg, Oral, Q8H PRN, Elysia So MD    enoxaparin (LOVENOX) subcutaneous injection 40 mg, 40 mg, Subcutaneous, Daily, Codi Ortiz MD    fluticasone-vilanterol (BREO ELLIPTA) 200-25 MCG/INH inhaler 1 puff, 1 puff, Inhalation, Daily, Joseph Bey MD, 1 puff at 05/07/19 0841    guaiFENesin (MUCINEX) 12 hr tablet 600 mg, 600 mg, Oral, Q12H [address], Codi Ortiz MD, 600 mg at 05/07/19 0840    haloperidol (HALDOL) oral concentrated solution 1 mg, 1 mg, Oral, Q6H PRN, Merissa Hamlin MD    haloperidol lactate (HALDOL) injection 2 mg, 2 mg, Intramuscular, Q6H PRN, Merissa Hamlin MD    hydrOXYzine HCL (ATARAX) tablet 25 mg, 25 mg, Oral, Q6H PRN, Merissa Hamlin MD    levothyroxine tablet 125 mcg, 125 mcg, Oral, Early Morning, Codi Ortiz MD, 125 mcg at 05/07/19 0605    magnesium hydroxide (MILK OF MAGNESIA) 400 mg/5 mL oral suspension 30 mL, 30 mL, Oral, Daily PRN, Merissa Hamlin MD    paliperidone (INVEGA) 24 hr tablet 3 mg, 3 mg, Oral, Daily, Fabiano Church MD, 3 mg at 05/07/19 1431    pantoprazole (PROTONIX) EC tablet 40 mg, 40 mg, Oral, Early Morning, Codi Ortiz MD, 40 mg at 05/07/19 0605    risperiDONE (RisperDAL M-TABS) dispersible tablet 1 mg, 1 mg, Oral, Q8H PRN, Merissa Hamlin MD    theophylline (JEF-24) 24 hr capsule 200 mg, 200 mg, Oral, Daily, Codi Ortiz MD, 200 mg at 05/07/19 0840    traZODone (DESYREL) tablet 25 mg, 25 mg, Oral, HS PRN, Codi Ortiz MD    Vitals:    05/07/19 1320   BP:    Pulse:    Resp:    Temp:    SpO2: 96%       Lab/work up: no new labs    Assessment:     Axis I:  · Schizoaffective disorder; has been treatment resistant  Axis II:  · OCPD  · Cluster B traits  Axis III:  - recovering from sepsis 2/2 CAP, COPD with asthma, stable compression fracture of L4   Axis IV:  · Limited social support, chronically homeless, financial problems, on disability, noncompliant with treatment    Progress: limited    Plan:   1  Disposition: Patient meets criteria for ongoing acute inpatient treatment due to being danger to self 2/2 psychosis and poor self care  Patient will be discharged when there is an absence of psychosis [id number]  2  Legal status: 18 petition upheld today  3  Psychopharmacologic interventions:  - Continue paliperidone 3mg po daily - titrate to effect; patient agrees to take it now  - Continue to monitor psychosis  4  Medical comorbidities: Hospitalist assessment appreciated  5  Other therapies: Individual/group/milieu therapy as appropriate   6  Social issues: Case management following to arrange disposition - may need guardianship  7   Precautions: Routine q15min checks, vitals qshift    Dayne Nolasco MD  05/07/19

## 2019-05-07 NOTE — PLAN OF CARE
Problem: OCCUPATIONAL THERAPY ADULT  Goal: Performs self-care activities at highest level of function for planned discharge setting  See evaluation for individualized goals  Description  Treatment Interventions: ADL retraining, Endurance training, Continued evaluation, Activityengagement(coping skills instruction, life management instruction)          See flowsheet documentation for full assessment, interventions and recommendations  5/7/2019 1253 by Tae Liao OT  Outcome: Progressing  Note:   Limitation: Decreased ADL status, Decreased high-level ADLs, Mood limitation(guarded, limited coping and life management skills)  Prognosis: Fair  Assessment: Nikurvashi Arevalo was present for approximately 25-30 minutes of this morning OT Life Management session  She was quiet, somewhat attentive  She was provided with a handout on the topic, "Letting Go of the Need to Control"  She did not say anything during group discussion but did at times have eye contact  She was present as the group reviewed different choices individuals have to help improve the quality of relationships  She was passively supportive of group process, she did request assistance to leave the group early  Progress has been guarded towards her goals  Encourage positive interactions with others and investment in group process as she is receptive to prompts

## 2019-05-08 PROCEDURE — 99232 SBSQ HOSP IP/OBS MODERATE 35: CPT | Performed by: PSYCHIATRY & NEUROLOGY

## 2019-05-08 RX ORDER — PALIPERIDONE 6 MG/1
6 TABLET, EXTENDED RELEASE ORAL DAILY
Status: DISCONTINUED | OUTPATIENT
Start: 2019-05-09 | End: 2019-05-10

## 2019-05-08 RX ORDER — ALBUTEROL SULFATE 90 UG/1
2 AEROSOL, METERED RESPIRATORY (INHALATION) EVERY 4 HOURS PRN
Status: DISCONTINUED | OUTPATIENT
Start: 2019-05-08 | End: 2019-05-30

## 2019-05-08 RX ADMIN — GUAIFENESIN 600 MG: 600 TABLET, EXTENDED RELEASE ORAL at 21:03

## 2019-05-08 RX ADMIN — GUAIFENESIN 600 MG: 600 TABLET, EXTENDED RELEASE ORAL at 08:32

## 2019-05-08 RX ADMIN — ALBUTEROL SULFATE 2 PUFF: 90 AEROSOL, METERED RESPIRATORY (INHALATION) at 13:57

## 2019-05-08 RX ADMIN — LEVOTHYROXINE SODIUM 125 MCG: 125 TABLET ORAL at 05:59

## 2019-05-08 RX ADMIN — ALBUTEROL SULFATE 2 PUFF: 90 AEROSOL, METERED RESPIRATORY (INHALATION) at 08:40

## 2019-05-08 RX ADMIN — PANTOPRAZOLE SODIUM 40 MG: 40 TABLET, DELAYED RELEASE ORAL at 05:59

## 2019-05-08 RX ADMIN — ALBUTEROL SULFATE 2 PUFF: 90 AEROSOL, METERED RESPIRATORY (INHALATION) at 02:35

## 2019-05-08 RX ADMIN — THEOPHYLLINE ANHYDROUS 200 MG: 200 CAPSULE, EXTENDED RELEASE ORAL at 08:32

## 2019-05-08 RX ADMIN — FLUTICASONE FUROATE AND VILANTEROL TRIFENATATE 1 PUFF: 200; 25 POWDER RESPIRATORY (INHALATION) at 08:38

## 2019-05-08 NOTE — PROGRESS NOTES
Alert,awake and isolative to her room between meals  Pt refused Invega and Lovenox this morning despite education and encouragement provided  Dr Nam Charlton was made aware  Pt has poor insight into her mental illness  Pt denies any AH or VH,denies depression and suicidal thoughts  Pt refused shower after encouragement and assistance offered  Will continue to monitor closely

## 2019-05-08 NOTE — PROGRESS NOTES
Patient spends a great deal of time in bed  Continuous O2 at 3L/min  She is fixated on her respiratory status  She presents as anxious yet calm; resists information and any help with ADLs   Disheveled  Asking about information (written) about s/e about Invega  Albuterol rescue inhaler [id number]  C/O SOB; 100/56 95 19  O2 sat 93% Does not appear to be in distress  Breath sounds diminished; no rales heard  Lungs clear  Refused HS Mucinex "my throat is dry and it is hot in here   ibuprofen can't take my medication"  Arguing with her new roommate about the room being too hot ( roommate is cold) Denies S/HI

## 2019-05-08 NOTE — CASE MANAGEMENT
Writer spoke with Aurora from [facility]  He will figure out meeting and time so it is not waited on  Writer expressed hospital's stand point  Elizabeth will let writer know ASAP when meeting will be held and who is attending  Concerns of [facility] are as followed 1  Patient not taking medications long term 2  Patient decompensating due to medication non compliance 3  Patient's baseline although no psychotic in the send of harming self or others will cause long term harm  CM will continue to follow and provide services as needed

## 2019-05-08 NOTE — NURSING NOTE
Patient received in bed awake and has slept long interval through the night  Calm, pleasant on approach and somatic behavior at times  On 3L/min O2 continuous via NC and preoccupied with respiratory issues  Requesting neb treatment for SOB at 0230, b/l diminished lung sounds without wheezing and mild non productive cough noted  Given albuterol inhaler PRN at 0235 which has been moderately effective  Patient remains in bed with HOB elevated and asleep at this time  Labs, orders and vital signs have been reviewed  On routine checks, will continue to monitor

## 2019-05-08 NOTE — PROGRESS NOTES
Clinical Pharmacy Note: Antipsychotic Monitoring Requirements     Uziel Mckay is a 66 y o  female currently on the following medications:    Current Facility-Administered Medications:     acetaminophen (TYLENOL) tablet 650 mg, 650 mg, Oral, Q6H PRN, Marbella Littlejohn MD    albuterol (PROVENTIL HFA,VENTOLIN HFA) inhaler 2 puff, 2 puff, Inhalation, Q6H PRN, Corbin Lang MD, 2 puff at 05/08/19 0840    aluminum-magnesium hydroxide-simethicone (MYLANTA) 200-200-20 mg/5 mL oral suspension 15 mL, 15 mL, Oral, Q4H PRN, Kaitlin Rivera MD    benzonatate (TESSALON PERLES) capsule 100 mg, 100 mg, Oral, TID PRN, Marbella Littlejohn MD    benztropine (COGENTIN) injection 1 mg, 1 mg, Intramuscular, Q8H PRN, Kaitlin Rivera MD    benztropine (COGENTIN) tablet 1 mg, 1 mg, Oral, Q8H PRN, Kaitlin Rivera MD    enoxaparin (LOVENOX) subcutaneous injection 40 mg, 40 mg, Subcutaneous, Daily, Marbella Littlejohn MD    fluticasone-vilanterol (BREO ELLIPTA) 200-25 MCG/INH inhaler 1 puff, 1 puff, Inhalation, Daily, Doris Dean MD, 1 puff at 05/08/19 0838    guaiFENesin (MUCINEX) 12 hr tablet 600 mg, 600 mg, Oral, Q12H [address], Marbella Littlejohn MD, 600 mg at 05/08/19 7880    haloperidol (HALDOL) oral concentrated solution 1 mg, 1 mg, Oral, Q6H PRN, Kaitlin Rivera MD    haloperidol lactate (HALDOL) injection 2 mg, 2 mg, Intramuscular, Q6H PRN, Kaitlin Rivera MD    hydrOXYzine HCL (ATARAX) tablet 25 mg, 25 mg, Oral, Q6H PRN, Kaitlin Rivera MD    levothyroxine tablet 125 mcg, 125 mcg, Oral, Early Morning, Marbella Littlejohn MD, 125 mcg at 05/08/19 0559    magnesium hydroxide (MILK OF MAGNESIA) 400 mg/5 mL oral suspension 30 mL, 30 mL, Oral, Daily PRN, Kaitlin Rivera MD    paliperidone (INVEGA) 24 hr tablet 3 mg, 3 mg, Oral, Daily, Nkechi Hobbs MD, 3 mg at 05/07/19 1431    pantoprazole (PROTONIX) EC tablet 40 mg, 40 mg, Oral, Early Morning, Marbella Littlejohn MD, 40 mg at 05/08/19 0559    risperiDONE (RisperDAL M-TABS) dispersible tablet 1 mg, 1 mg, Oral, Q8H PRN, Kaitlin Rivera MD    theophylline (JEF-24) 24 hr capsule 200 mg, 200 mg, Oral, Daily, Marbella Littlejohn MD, 200 mg at 05/08/19 2984    traZODone (DESYREL) tablet 25 mg, 25 mg, Oral, HS PRN, Marbella Littlejohn MD    Performing metabolic monitoring on patients receiving any antipsychotics is a [facility] and Medicaid Services (CMS) requirement  Antipsychotic treatment increases the risk of developing type 2 diabetes mellitus, hypertension, and hyperlipidemia  According to the [facility] [address] (NICE) guidelines, baseline weight, waist circumference, pulse, blood pressure, fasting blood glucose, hemoglobin A1c, lipid profile, and prolactin level (if initiating risperidone or paliperidone) should be collected  These guidelines coincide with Centers and Medicare & Medicaid Services (CMS) requirements including baseline Body Mass Index, blood pressure, fasting glucose or hemoglobin A1c, and fasting lipid panel  CMS states laboratory values collected 12 months from discharge date are acceptable for evaluation  CMS Checklist:     BMI: yes  BP: yes  Fasting glucose: yes       OR A1c: yes  Lipid panel within last 12 months: yes  Nicotine Replacement Therapy: no, patient refused    The following values have been collected:  Value Status Result    BMI Body mass index is 25 43 kg/m²     Blood pressure /55 (BP Location: Left arm)   Pulse 72   Temp 97 6 °F (36 4 °C) (Temporal)   Resp 18   Ht 5' 4" (1 626 m)   Wt 67 2 kg (148 lb 2 4 oz)   SpO2 93%   BMI 25 43 kg/m²    Fasting glucose 0   Lab Value Date/Time    GLUC 75 05/02/2019 0524        Hemoglobin A1c 0   Lab Value Date/Time    HGBA1C 5 5 01/17/2019 1647    HGBA1C 5 9 (H) 04/27/2015 0530        Lipid panel 0   Lab Value Date/Time    CHOLESTEROL 131 05/02/2019 0524       0   Lab Value Date/Time    HDL 42 05/02/2019 0524    HDL 74 01/23/2015 0928       0   Lab Value Date/Time    LDLCALC 76 05/02/2019 0524    LDLCALC 121 (H) 01/23/2015 0928        0   Lab Value Date/Time    TRIG 63 05/02/2019 0524    TRIG 123 01/23/2015 0928          Recommendations:  CMS requirements have been completed    Pharmacy will continue to follow patient with team   Electronically signed by: Tory Gallegos Pharmacist

## 2019-05-08 NOTE — CASE MANAGEMENT
Writer spoke with [employer] [facility]) and Manuel Carney ([address] )  Writer explained the plan for long acting injectable to be given next week followed by discharge soon after  Writer explained unrealistic aspects of patient being moved from [facility] to somewhere new at the request of the patient before discharge  Patient not accepting however thanked the  writer for being transparent and honest  Writer received frantic call from ACT team about discharge for next week  Writer explained that she understood patient not taking medications in the community but explained that the was the idea behind the long acting so she has constant medication in her body  Patient also stated that the hospital was not able to keep someone just because they stated that she would not take medications in the future  ACT Team was not happy and requested meeting with CL SHANE, Denver Merino, [address] [facility], and ACT team  Courtney Rosio explained that we are open to anytime and let writer know when they were all available  CL requested call from Denver Merino, awaiting call back   CM will continue to follow and provide services as needed

## 2019-05-09 PROCEDURE — 99232 SBSQ HOSP IP/OBS MODERATE 35: CPT | Performed by: PSYCHIATRY & NEUROLOGY

## 2019-05-09 PROCEDURE — 97150 GROUP THERAPEUTIC PROCEDURES: CPT

## 2019-05-09 RX ADMIN — ALBUTEROL SULFATE 2 PUFF: 90 AEROSOL, METERED RESPIRATORY (INHALATION) at 08:36

## 2019-05-09 RX ADMIN — GUAIFENESIN 600 MG: 600 TABLET, EXTENDED RELEASE ORAL at 20:46

## 2019-05-09 RX ADMIN — LEVOTHYROXINE SODIUM 125 MCG: 125 TABLET ORAL at 06:20

## 2019-05-09 RX ADMIN — ALBUTEROL SULFATE 2 PUFF: 90 AEROSOL, METERED RESPIRATORY (INHALATION) at 18:41

## 2019-05-09 RX ADMIN — ALBUTEROL SULFATE 2 PUFF: 90 AEROSOL, METERED RESPIRATORY (INHALATION) at 13:31

## 2019-05-09 RX ADMIN — ALBUTEROL SULFATE 2 PUFF: 90 AEROSOL, METERED RESPIRATORY (INHALATION) at 00:32

## 2019-05-09 RX ADMIN — PALIPERIDONE 6 MG: 6 TABLET, FILM COATED, EXTENDED RELEASE ORAL at 08:35

## 2019-05-09 RX ADMIN — GUAIFENESIN 600 MG: 600 TABLET, EXTENDED RELEASE ORAL at 08:35

## 2019-05-09 RX ADMIN — FLUTICASONE FUROATE AND VILANTEROL TRIFENATATE 1 PUFF: 200; 25 POWDER RESPIRATORY (INHALATION) at 08:36

## 2019-05-09 RX ADMIN — PANTOPRAZOLE SODIUM 40 MG: 40 TABLET, DELAYED RELEASE ORAL at 06:20

## 2019-05-09 RX ADMIN — THEOPHYLLINE ANHYDROUS 200 MG: 200 CAPSULE, EXTENDED RELEASE ORAL at 08:35

## 2019-05-09 NOTE — PROGRESS NOTES
Alert,awake and visible during meals only,then isolative to her room  Pleasant and cooperative with staff today  Medication compliant  Pt only attended part of OT group  No distress noted  Will continue to monitor closely

## 2019-05-09 NOTE — PLAN OF CARE
Problem: OCCUPATIONAL THERAPY ADULT  Goal: Performs self-care activities at highest level of function for planned discharge setting  See evaluation for individualized goals  Description  Treatment Interventions: ADL retraining, Continued evaluation, Activityengagement(coping skills instruction, life management instruction)          See flowsheet documentation for full assessment, interventions and recommendations  Outcome: Not Progressing  Note:   Limitation: Decreased ADL status, Decreased high-level ADLs, Mood limitation(guarded, limited coping and life management skills)  Prognosis: Fair  Assessment: Anmol Gonzalez was present for the first 25-30 minutes of this OT Life Management session  She was present as the group discussed community supports such as CHRIS and Warmline  She was pleasant on approach, but early in the session she did state that she needed to attend to personal care  She was superficially pleasant but did not engage in further group discussion as this pertained to positive memories  She was at best passively supportive of group process, and she did make repeated need requests that she wanted to leave (she was polite when doing so)  She did not return to the group setting  Progress has been guarded towards her goals given her limited investment in group process   Continue to promote positive investment in OT program

## 2019-05-09 NOTE — NURSING NOTE
Patient has been isolative to room, pleasant and cooperative  Depressed affect, quiet and no demanding behaviors this shift  Patient has been compliant with treatment, given PRN albuterol per request at [facility]  Labs, orders and vital signs have been reviewed   On routine checks, will continue to monitor

## 2019-05-09 NOTE — PROGRESS NOTES
Pt discussed at treatment rounds today, pt was medication compliant this morning plan is still for long acting injection early next week

## 2019-05-09 NOTE — OCCUPATIONAL THERAPY NOTE
Occupational Therapy Group Treatment Note      Ashleigh Rutherford    5/9/2019    Patient Active Problem List   Diagnosis    Sepsis ([address] )    Pneumonia of right upper lobe due to infectious organism ([address] )    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[address] )    Tobacco use disorder, continuous    Bipolar disorder ([address] )    Left hip pain    Lactic acidosis    Hypokalemia    Hypomagnesemia    Compression fracture of L4 lumbar vertebra    Thoracic compression fracture (HCC)    Ventral hernia    Parapneumonic effusion    Acute on chronic respiratory failure with hypoxia (HCC)    Chronic respiratory failure (HCC)    Hypophosphatemia    Elevated MCV    Compression deformity of vertebra    Schizoaffective disorder, bipolar type ([address] )    Acquired hypothyroidism    Gastroesophageal reflux disease without esophagitis    Abnormal CT of the chest    Excessive cerumen in left ear canal    Lipoma of right upper extremity    Polydipsia    Localized swelling of both lower legs       Past Medical History:   Diagnosis Date    Acid reflux     Anxiety     Asthma     Bipolar 1 disorder (HCC)     Chronic pain disorder     Chronic respiratory failure (HCC)     Compression fracture of fourth lumbar vertebra ([address] )     COPD (chronic obstructive pulmonary disease) (HCC)     Depression     GERD (gastroesophageal reflux disease)     History of home oxygen therapy     Hypothyroidism     Lipoma of upper extremity     Psychiatric illness     Schizoaffective disorder ([address] )     Substance abuse ([address] )     Nicotine    Thoracic compression fracture ([address] )     Ventral hernia        No past surgical history on file  05/09/19 1010   Assessment   Assessment Patsy was present for the first 25-30 minutes of this OT Life Management session  She was present as the group discussed community supports such as CHRIS and Warmline  She was pleasant on approach, but early in the session she did state that she needed to attend to personal care   She was superficially pleasant but did not engage in further group discussion as this pertained to positive memories  She was at best passively supportive of group process, and she did make repeated need requests that she wanted to leave (she was polite when doing so)  She did not return to the group setting  Progress has been guarded towards her goals given her limited investment in group process  Continue to promote positive investment in OT program    Plan   Treatment Interventions ADL retraining;Continued evaluation; Activityengagement  (coping skills instruction, life management instruction)   Goal Expiration Date 06/06/19   Treatment Day 3   OT Frequency 5x/wk   Chucky Severance, ROSA MARIA

## 2019-05-09 NOTE — PROGRESS NOTES
MOM offered for no BM since 5/6,but pt refused  Stated ' I am ok"  Will continue to monitor closely

## 2019-05-09 NOTE — PROGRESS NOTES
[address] [facility]  Psychiatrists  Progress Note    Patient Name: Merly Galaviz  MRN: [id number]  DOS: 05/08/19     Chief Complaint:  paranoid delusions    Interval History: Per staff, patient has been somatically preoccupied  She reportedly refused paliperidone this morning  She claims it was not the medication Dr Sheryle Minor recommended despite having agreed to it and having taken it yesterday  Patient is difficult to engage in assessment  Per staff, patient continues to claim she is only willing to be seen by Dr Sheryle Minor  She still does not wish to go back to [place]  Medication noncompliant  Adverse effects of medications: none reported      Mental Status Exam [Per above +]  Appearance: disheveled  Behavior: poorly engaged; poor eye contact  Speech: wnl  Mood: unable to ascertain  Affect: dysphoric, stable, constricted  Thought process: linear  Thought content: no delusions elicited; no reports of hopelessness and ideations of self harm  Perceptual disturbances: no internal preoccupation noted  Cognition: oriented to all domains     Insight: poor  Judgement: poor        Current Facility-Administered Medications:     acetaminophen (TYLENOL) tablet 650 mg, 650 mg, Oral, Q6H PRN, Sandy Acosta MD    albuterol (PROVENTIL HFA,VENTOLIN HFA) inhaler 2 puff, 2 puff, Inhalation, Q4H PRN, Maria Isabel Vega MD, 2 puff at 05/08/19 1357    aluminum-magnesium hydroxide-simethicone (MYLANTA) 200-200-20 mg/5 mL oral suspension 15 mL, 15 mL, Oral, Q4H PRN, Kaela Mares MD    benzonatate (TESSALON PERLES) capsule 100 mg, 100 mg, Oral, TID PRN, Sandy Acosta MD    benztropine (COGENTIN) injection 1 mg, 1 mg, Intramuscular, Q8H PRN, Kaela Mares MD    benztropine (COGENTIN) tablet 1 mg, 1 mg, Oral, Q8H PRN, Kaela Mares MD    enoxaparin (LOVENOX) subcutaneous injection 40 mg, 40 mg, Subcutaneous, Daily, Sandy Acosta MD  [employer] fluticasone-vilanterol (BREO ELLIPTA) 200-25 MCG/INH inhaler 1 puff, 1 puff, Inhalation, Daily, Belinda Navarro MD, 1 puff at 05/08/19 0838    guaiFENesin (MUCINEX) 12 hr tablet 600 mg, 600 mg, Oral, Q12H [facility], Lynne Donohue MD, 600 mg at 05/08/19 2103    haloperidol (HALDOL) oral concentrated solution 1 mg, 1 mg, Oral, Q6H PRN, Mikal Dang MD    haloperidol lactate (HALDOL) injection 2 mg, 2 mg, Intramuscular, Q6H PRN, Mikal Dang MD    hydrOXYzine HCL (ATARAX) tablet 25 mg, 25 mg, Oral, Q6H PRN, Mikal Dang MD    levothyroxine tablet 125 mcg, 125 mcg, Oral, Early Morning, yLnne Donohue MD, 125 mcg at 05/08/19 0559    magnesium hydroxide (MILK OF MAGNESIA) 400 mg/5 mL oral suspension 30 mL, 30 mL, Oral, Daily PRN, Mikal Dang MD    [START ON 5/9/2019] paliperidone (INVEGA) 24 hr tablet 6 mg, 6 mg, Oral, Daily, Jenniffer Pimentel MD    pantoprazole (PROTONIX) EC tablet 40 mg, 40 mg, Oral, Early Morning, Lynne Donohue MD, 40 mg at 05/08/19 0559    risperiDONE (RisperDAL M-TABS) dispersible tablet 1 mg, 1 mg, Oral, Q8H PRN, Mikal Dang MD    theophylline (JEF-24) 24 hr capsule 200 mg, 200 mg, Oral, Daily, Lynne Donohue MD, 200 mg at 05/08/19 4856    traZODone (DESYREL) tablet 25 mg, 25 mg, Oral, HS PRN, Lynne Donohue MD    Vitals:    05/08/19 1546   BP: 92/53   Pulse: 80   Resp: 18   Temp: 98 °F (36 7 °C)   SpO2: 96%       Lab/work up: no new labs    Assessment:     Axis I:  · Schizoaffective disorder; has been treatment resistant  Axis II:  · OCPD  · Cluster B traits  Axis III:  - recovering from sepsis 2/2 CAP, COPD with asthma, stable compression fracture of L4   Axis IV:  · Limited social support, chronically homeless, financial problems, on disability, noncompliant with treatment    Progress: limited    Plan:   1  Disposition: Patient meets criteria for ongoing acute inpatient treatment due to being danger to self 2/2 psychosis and poor self care   Patient will be discharged when there is an absence of psychosis [id number]  2  Legal status: 303  3  Psychopharmacologic interventions:  - increase paliperidone to 6mg po daily with plan to eventually transition to ROSALES  - Continue to monitor psychosis  4  Medical comorbidities: Hospitalist assessment appreciated  5  Other therapies: Individual/group/milieu therapy as appropriate   6  Social issues: Case management following to arrange disposition - may need guardianship  7   Precautions: Routine q15min checks, vitals qshift    Reilly Fletcher MD  05/08/19

## 2019-05-09 NOTE — TREATMENT PLAN
TREATMENT PLAN REVIEW - 30 [facility] 66 y o  1955 female MRN: [id number]    [address] OABHU Room / Bed: Edith Villarreal [facility] Encounter: [id number]          Admit Date/Time:  5/5/2019  7:11 PM    Treatment Team: Attending Provider: Otilio Cox MD; Consulting Physician: Binh Donis MD; Consulting Physician: Warren Camilo MD; : Elston Mcburney; Patient Care Assistant: Toro Chang; Registered Nurse: Tere Shaffer, RN; Registered Nurse: Aziza Church, RN; Patient Care Assistant: Eve Haney; : Isa Polo; Registered Nurse: Justino Garcia, LISA; Occupational Therapist: Lissa Vivar OT    Diagnosis: Principal Problem:    Schizoaffective disorder, bipolar type [facility])  Active Problems:    Pneumonia of right upper lobe due to infectious organism [facility])    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[facility] [address] )    Acquired hypothyroidism    Gastroesophageal reflux disease without esophagitis      Patient Strengths/Assets: average or above intelligence    Patient Barriers/Limitations: difficulty adapting    Short Term Goals: decrease in depressive symptoms, decrease in psychotic symptoms, decrease in level of agitation    Long Term Goals: resolution of manic symptoms, stabilization of mood, resolution of psychotic symptoms    Progress Towards Goals: starting psychiatric medications as prescribed    Recommended Treatment: medication management, patient medication education, group therapy, milieu therapy, continued Behavioral Health psychiatric evaluation/assessment process    Treatment Frequency: daily medication monitoring, group and milieu therapy daily, monitoring through interdisciplinary rounds, monitoring through weekly patient care conferences    Expected Discharge Date:   In 7 days    Discharge Plan: return to previous living arrangement    Treatment Plan Created/Updated By: Otilio Cox MD

## 2019-05-10 LAB — GLUCOSE SERPL-MCNC: 156 MG/DL (ref 65–140)

## 2019-05-10 PROCEDURE — 82948 REAGENT STRIP/BLOOD GLUCOSE: CPT

## 2019-05-10 PROCEDURE — 99232 SBSQ HOSP IP/OBS MODERATE 35: CPT | Performed by: PSYCHIATRY & NEUROLOGY

## 2019-05-10 RX ADMIN — PANTOPRAZOLE SODIUM 40 MG: 40 TABLET, DELAYED RELEASE ORAL at 06:35

## 2019-05-10 RX ADMIN — GUAIFENESIN 600 MG: 600 TABLET, EXTENDED RELEASE ORAL at 08:13

## 2019-05-10 RX ADMIN — GUAIFENESIN 600 MG: 600 TABLET, EXTENDED RELEASE ORAL at 20:57

## 2019-05-10 RX ADMIN — ALBUTEROL SULFATE 2 PUFF: 90 AEROSOL, METERED RESPIRATORY (INHALATION) at 13:26

## 2019-05-10 RX ADMIN — FLUTICASONE FUROATE AND VILANTEROL TRIFENATATE 1 PUFF: 200; 25 POWDER RESPIRATORY (INHALATION) at 08:20

## 2019-05-10 RX ADMIN — ALBUTEROL SULFATE 2 PUFF: 90 AEROSOL, METERED RESPIRATORY (INHALATION) at 08:26

## 2019-05-10 RX ADMIN — ALBUTEROL SULFATE 2 PUFF: 90 AEROSOL, METERED RESPIRATORY (INHALATION) at 22:17

## 2019-05-10 RX ADMIN — ALBUTEROL SULFATE 2 PUFF: 90 AEROSOL, METERED RESPIRATORY (INHALATION) at 00:05

## 2019-05-10 RX ADMIN — LEVOTHYROXINE SODIUM 125 MCG: 125 TABLET ORAL at 06:35

## 2019-05-10 RX ADMIN — PALIPERIDONE 6 MG: 6 TABLET, FILM COATED, EXTENDED RELEASE ORAL at 08:13

## 2019-05-10 RX ADMIN — THEOPHYLLINE ANHYDROUS 200 MG: 200 CAPSULE, EXTENDED RELEASE ORAL at 08:13

## 2019-05-10 NOTE — PROGRESS NOTES
Pt was escorted to shower room and showered by RN and MHT  Pt tolerated shower and assisted staff with washing and drying herself  Pt dressed herself and returned to her bed

## 2019-05-10 NOTE — NURSING NOTE
Patient walking to [address] independently  No c/o SOB   Will continue to monitor per protocol

## 2019-05-10 NOTE — NURSING NOTE
Patient requesting inhaler  Patient on 3L nasal cannula, O2 97%  Lungs diminished PRN given as ordered at [address]  Denies pain  Patient repositioned in bed and educated to maintain head of bed to at least 30 degree angle  Also, encouraged patient to be OOB as tolerated during day shift  No further complaints    Will continue to monitor per protocol

## 2019-05-10 NOTE — PLAN OF CARE
Problem: Alteration in Thoughts and Perception  Goal: Treatment Goal: Gain control of psychotic behaviors/thinking, reduce/eliminate presenting symptoms and demonstrate improved reality functioning upon discharge  Outcome: Progressing  Goal: Verbalize thoughts and feelings  Description  Interventions:  - Promote a nonjudgmental and trusting relationship with the patient through active listening and therapeutic communication  - Assess patient's level of functioning, behavior and potential for risk  - Engage patient in 1 on 1 interactions for a minimum of 15 minutes each session  - Encourage patient to express fears, feelings, frustrations, and discuss symptoms    - [place] patient to reality, help patient recognize reality-based thinking   - Administer medications as ordered and assess for potential side effects  - Provide the patient education related to the signs and symptoms of the illness and desired effects of prescribed medications  Outcome: Progressing  Goal: Refrain from acting on delusional thinking/internal stimuli  Description  Interventions:  - Monitor patient closely, per order   - Utilize least restrictive measures   - Set reasonable limits, give positive feedback for acceptable   - Administer medications as ordered and monitor of potential side effects  Outcome: Progressing  Goal: Agree to be compliant with medication regime, as prescribed and report medication side effects  Description  Interventions:  - Offer appropriate PRN medication and supervise ingestion; conduct aims, as needed   Outcome: Progressing  Goal: Attend and participate in unit activities, including therapeutic, recreational, and educational groups  Description  Interventions:  - Provide therapeutic and educational activities daily, encourage attendance and participation, and document same in the medical record   Outcome: Progressing  Goal: Recognize dysfunctional thoughts, communicate reality-based thoughts at the time of discharge  Description  Interventions:  - Provide medication and psycho-education to assist patient in compliance and developing insight into his/her illness   Outcome: Progressing  Goal: Complete daily ADLs, including personal hygiene independently, as able  Description  Interventions:  - Observe, teach, and assist patient with ADLS  - Monitor and promote a balance of rest/activity, with adequate nutrition and elimination   Outcome: Progressing     Problem: Ineffective Coping  Goal: Cooperates with admission process  Description  Interventions:   - Complete admission process  Outcome: Progressing  Goal: Identifies ineffective coping skills  Outcome: Progressing  Goal: Identifies healthy coping skills  Outcome: Progressing  Goal: Demonstrates healthy coping skills  Outcome: Progressing  Goal: Participates in unit activities  Description  Interventions:  - Provide therapeutic environment   - Provide required programming   - Redirect inappropriate behaviors   Outcome: Progressing  Goal: Patient/Family participate in treatment and DC plans  Description  Interventions:  - Provide therapeutic environment  Outcome: Progressing  Goal: Patient/Family verbalizes awareness of resources  Outcome: Progressing  Goal: Understands least restrictive measures  Description  Interventions:  - Utilize least restrictive behavior  Outcome: Progressing  Goal: Free from restraint events  Description  - Utilize least restrictive measures   - Provide behavioral interventions   - Redirect inappropriate behaviors   Outcome: Progressing     Problem: Risk for Self Injury/Neglect  Goal: Treatment Goal: Remain safe during length of stay, learn and adopt new coping skills, and be free of self-injurious ideation, impulses and acts at the time of discharge  Outcome: Progressing  Goal: Verbalize thoughts and feelings  Description  Interventions:  - Assess and re-assess patient's lethality and potential for self-injury  - Engage patient in 1:1 interactions, daily, for a minimum of 15 minutes  - Encourage patient to express feelings, fears, frustrations, hopes  - Establish rapport/trust with patient   Outcome: Progressing  Goal: Refrain from harming self  Description  Interventions:  - Monitor patient closely, per order  - Develop a trusting relationship  - Supervise medication ingestion, monitor effects and side effects   Outcome: Progressing  Goal: Attend and participate in unit activities, including therapeutic, recreational, and educational groups  Description  Interventions:  - Provide therapeutic and educational activities daily, encourage attendance and participation, and document same in the medical record  - Obtain collateral information, encourage visitation and family involvement in care   Outcome: Progressing  Goal: Recognize maladaptive responses and adopt new coping mechanisms  Outcome: Progressing  Goal: Complete daily ADLs, including personal hygiene independently, as able  Description  Interventions:  - Observe, teach, and assist patient with ADLS  - Monitor and promote a balance of rest/activity, with adequate nutrition and elimination  Outcome: Progressing     Problem: Depression  Goal: Treatment Goal: Demonstrate behavioral control of depressive symptoms, verbalize feelings of improved mood/affect, and adopt new coping skills prior to discharge  Outcome: Progressing  Goal: Verbalize thoughts and feelings  Description  Interventions:  - Assess and re-assess patient's level of risk   - Engage patient in 1:1 interactions, daily, for a minimum of 15 minutes   - Encourage patient to express feelings, fears, frustrations, hopes   Outcome: Progressing  Goal: Refrain from harming self  Description  Interventions:  - Monitor patient closely, per order   - Supervise medication ingestion, monitor effects and side effects   Outcome: Progressing  Goal: Refrain from isolation  Description  Interventions:  - Develop a trusting relationship   - Encourage socialization   Outcome: Progressing  Goal: Refrain from self-neglect  Outcome: Progressing  Goal: Attend and participate in unit activities, including therapeutic, recreational, and educational groups  Description  Interventions:  - Provide therapeutic and educational activities daily, encourage attendance and participation, and document same in the medical record   Outcome: Progressing  Goal: Complete daily ADLs, including personal hygiene independently, as able  Description  Interventions:  - Observe, teach, and assist patient with ADLS  -  Monitor and promote a balance of rest/activity, with adequate nutrition and elimination   Outcome: Progressing     Problem: Anxiety  Goal: Anxiety is at manageable level  Description  Interventions:  - Assess and monitor patient's anxiety level  - Monitor for signs and symptoms of anxiety both physical and emotional (heart palpitations, chest pain, shortness of breath, headaches, nausea, feeling jumpy, restlessness, irritable, apprehensive)  - Collaborate with interdisciplinary team and initiate plan and interventions as ordered    - [place] patient to unit/surroundings  - Explain treatment plan  - Encourage participation in care  - Encourage verbalization of concerns/fears  - Identify coping mechanisms  - Assist in developing anxiety-reducing skills  - Administer/offer alternative therapies  - Limit or eliminate stimulants  Outcome: Progressing     Problem: DISCHARGE PLANNING - CARE MANAGEMENT  Goal: Discharge to post-acute care or home with appropriate resources  Description  INTERVENTIONS:  - Conduct assessment to determine patient/family and health care team treatment goals, and need for post-acute services based on payer coverage, community resources, and patient preferences, and barriers to discharge  - Address psychosocial, clinical, and financial barriers to discharge as identified in assessment in conjunction with the patient/family and health care team  - Arrange appropriate level of post-acute services according to patients   needs and preference and payer coverage in collaboration with the physician and health care team  - Communicate with and update the patient/family, physician, and health care team regarding progress on the discharge plan  - Arrange appropriate transportation to post-acute venues  Outcome: Progressing     Problem: Prexisting or High Potential for Compromised Skin Integrity  Goal: Skin integrity is maintained or improved  Description  INTERVENTIONS:  - Identify patients at risk for skin breakdown  - Assess and monitor skin integrity  - Assess and monitor nutrition and hydration status  - Monitor labs (i e  albumin)  - Assess for incontinence   - Turn and reposition patient  - Assist with mobility/ambulation  - Relieve pressure over bony prominences  - Avoid friction and shearing  - Provide appropriate hygiene as needed including keeping skin clean and dry  - Evaluate need for skin moisturizer/barrier cream  - Collaborate with interdisciplinary team (i e  Nutrition, Rehabilitation, etc )   - Patient/family teaching  Outcome: Progressing

## 2019-05-10 NOTE — PROGRESS NOTES
14[address] [facility]iatry  Psychiatrists  Progress Note    Patient Name: Yassine Patrick  MRN: [id number]  DOS: 05/09/19     Chief Complaint:  paranoid delusions    Interval History: Per staff, patient has been more compliant medications but still is argumentative with staff and tries to dictate care  Patient did take the increased dose of invega today despite her arguing yesterday that she would not take it  Patient is illogical - she states the antibiotics she took several days ago "are still working" as she recovers from pneumonia  No significant paranoid noted  Denies SI  Still does not wish to return to [place]  Medication noncompliant  Adverse effects of medications: none reported      Mental Status Exam [Per above +]  Appearance: disheveled, poor hygiene; no abnormal involuntary movements noted  Behavior: uncooperative with assessment  Speech: soft, normal rate  Mood: irritable  Affect: congruent but constricted, stable  Thought process: tangential, illogical  Thought content: no delusions elicited; denies SI/HI  Perceptual disturbances: denies AH/VH  Cognition:  oriented to all domains     Insight: poor  Judgement: poor      Current Facility-Administered Medications:     acetaminophen (TYLENOL) tablet 650 mg, 650 mg, Oral, Q6H PRN, James Stewart MD    albuterol (PROVENTIL HFA,VENTOLIN HFA) inhaler 2 puff, 2 puff, Inhalation, Q4H PRN, Alfa Guan MD, 2 puff at 05/09/19 1841    aluminum-magnesium hydroxide-simethicone (MYLANTA) 200-200-20 mg/5 mL oral suspension 15 mL, 15 mL, Oral, Q4H PRN, Tito Gibbs MD    benzonatate (TESSALON PERLES) capsule 100 mg, 100 mg, Oral, TID PRN, James Stewart MD    benztropine (COGENTIN) injection 1 mg, 1 mg, Intramuscular, Q8H PRN, Tito Gibbs MD    benztropine (COGENTIN) tablet 1 mg, 1 mg, Oral, Q8H PRN, Tito Gibbs MD    enoxaparin (LOVENOX) subcutaneous injection 40 mg, 40 mg, Subcutaneous, Daily, Felipa Johns MD    fluticasone-vilanterol (BREO ELLIPTA) 200-25 MCG/INH inhaler 1 puff, 1 puff, Inhalation, Daily, Ulyses Boas, MD, 1 puff at 05/09/19 0836    guaiFENesin (MUCINEX) 12 hr tablet 600 mg, 600 mg, Oral, Q12H [address], Felipa Johns MD, 600 mg at 05/09/19 2046    haloperidol (HALDOL) oral concentrated solution 1 mg, 1 mg, Oral, Q6H PRN, Eva Ambriz MD    haloperidol lactate (HALDOL) injection 2 mg, 2 mg, Intramuscular, Q6H PRN, Eva Ambriz MD    hydrOXYzine HCL (ATARAX) tablet 25 mg, 25 mg, Oral, Q6H PRN, Eva Ambriz MD    levothyroxine tablet 125 mcg, 125 mcg, Oral, Early Morning, Felipa Johns MD, 125 mcg at 05/09/19 0620    magnesium hydroxide (MILK OF MAGNESIA) 400 mg/5 mL oral suspension 30 mL, 30 mL, Oral, Daily PRN, Eva Ambriz MD    paliperidone (INVEGA) 24 hr tablet 6 mg, 6 mg, Oral, Daily, Otilio Cox MD, 6 mg at 05/09/19 0835    pantoprazole (PROTONIX) EC tablet 40 mg, 40 mg, Oral, Early Morning, Felipa Johns MD, 40 mg at 05/09/19 0620    risperiDONE (RisperDAL M-TABS) dispersible tablet 1 mg, 1 mg, Oral, Q8H PRN, Eva Ambriz MD    theophylline (JEF-24) 24 hr capsule 200 mg, 200 mg, Oral, Daily, Felipa Johns MD, 200 mg at 05/09/19 0835    traZODone (DESYREL) tablet 25 mg, 25 mg, Oral, HS PRN, Felipa Johns MD    Vitals:    05/09/19 2037   BP: 98/53   Pulse: 75   Resp: 19   Temp: 97 7 °F (36 5 °C)   SpO2: 96%       Lab/work up: no new labs    Assessment:     Axis I:  · Schizoaffective disorder; has been treatment resistant  Axis II:  · OCPD  · Cluster B traits  Axis III:  - recovering from sepsis 2/2 CAP, COPD with asthma, stable compression fracture of L4   Axis IV:  · Limited social support, chronically homeless, financial problems, on disability, noncompliant with treatment    Progress: limited    Plan:   1   Disposition: Patient meets criteria for ongoing acute inpatient treatment due to being danger to self 2/2 psychosis and poor self care  Patient will be discharged when there is an absence of psychosis [id number]  2  Legal status: 303  3  Psychopharmacologic interventions:  - continue paliperidone 6mg po daily with plan to eventually transition to ROSALES  - Continue to monitor psychosis  4  Medical comorbidities: Hospitalist following PRN  5  Other therapies: Individual/group/milieu therapy as appropriate   6  Social issues: Case management following to arrange disposition - may need guardianship  7   Precautions: Routine q15min checks, vitals qshift    Yury Pressley MD  05/09/19

## 2019-05-10 NOTE — PROGRESS NOTES
Pt screaming from day room "Check my vitals, I'm dying!"  When asked what was wrong, pt states "I can't eat!" Pt ate 100% of her breakfast   Pt's vitals taken, see flowsheet  VSS  Pt escorted to her room per her request   Pt sitting in her bed complaining that she is dying  Pt states that "my blood sugar is low bc I can't eat" and "I need something to eat bc by blood sugar is low"  Blood sugar taken and it was 156  Pt counseled on medication and meal compliance  Will continue to monitor

## 2019-05-10 NOTE — CASE MANAGEMENT
MD Jose E,  [employer], and [facility] reps met to discuss patient  Original plan was for the patient to sign back into [employer] and work with them however refused and terminated services  Patient verbalized understanding that if she did not agree with these the next step would to be take guardianship of patient  Patient stated "I will take my chances with the "  Writer spoke with Dr Christy Rodriguez who stated she will petition for this   talked with   Merly Nuñez who stated he will hold the best at [facility] until guardianship was completed  CM will continue to follow and provide services as needed

## 2019-05-10 NOTE — NURSING NOTE
Patient has been isolative to room, calm and depressed affect  Pleasant and social with staff on assessment, preoccupied with respiratory issues  Given albuterol inhaler for SOB at 1841, b/l lungs clear on auscultation  Labs, orders and vital signs have been reviewed  On routine checks, will continue to monitor

## 2019-05-11 RX ADMIN — ALBUTEROL SULFATE 2 PUFF: 90 AEROSOL, METERED RESPIRATORY (INHALATION) at 18:04

## 2019-05-11 RX ADMIN — GUAIFENESIN 600 MG: 600 TABLET, EXTENDED RELEASE ORAL at 08:42

## 2019-05-11 RX ADMIN — LEVOTHYROXINE SODIUM 125 MCG: 125 TABLET ORAL at 05:57

## 2019-05-11 RX ADMIN — THEOPHYLLINE ANHYDROUS 200 MG: 200 CAPSULE, EXTENDED RELEASE ORAL at 08:42

## 2019-05-11 RX ADMIN — FLUTICASONE FUROATE AND VILANTEROL TRIFENATATE 1 PUFF: 200; 25 POWDER RESPIRATORY (INHALATION) at 08:43

## 2019-05-11 RX ADMIN — ALBUTEROL SULFATE 2 PUFF: 90 AEROSOL, METERED RESPIRATORY (INHALATION) at 08:43

## 2019-05-11 RX ADMIN — PANTOPRAZOLE SODIUM 40 MG: 40 TABLET, DELAYED RELEASE ORAL at 05:57

## 2019-05-11 RX ADMIN — PALIPERIDONE 6 MG: 3 TABLET, EXTENDED RELEASE ORAL at 08:42

## 2019-05-11 NOTE — PROGRESS NOTES
Pt visible on unit, social with select peers  Pt is partially compliant with meds  She is prescribed 9 mg Invega, but would only take 6 mg "until I talk with the doctor on Monday"  RN counseled pt on importance of taking prescribed medication but pt was insistent that she was not going to take the rest of the [place]  Will continue to encourage medication compliance for a healthy recovery

## 2019-05-11 NOTE — PROGRESS NOTES
Psychiatry Progress Note    Subjective: Interval History     Patient cooperative with assessment this morning  Patient reports that she thinks she may be having some side effects to her Invega  Patient reports that she is feeling weak  Denying feeling dizzy or unsteady  States that she does not feel that it is significant enough to warrant discontinuation of the medication  Patient reports that she understands that she needs to give the medication some time to adjust   Patient reports that she is hoping that the medications will help to stabilize her mood  Able to identify that she is irritable at times  Patient denying any depression or suicidal ideations today  Patient has been with no aggressive behaviors  Patient denying any auditory visual hallucinations  No overt paranoia during assessment this morning  Has been medication and meal compliant  Slept well last evening      Behavior over the last 24 hours:  unchanged  Sleep: normal  Appetite: normal  Medication side effects: No  ROS: no complaints    Current medications:    Current Facility-Administered Medications:     acetaminophen (TYLENOL) tablet 650 mg, 650 mg, Oral, Q6H PRN, Dusty Thacker MD    albuterol (PROVENTIL HFA,VENTOLIN HFA) inhaler 2 puff, 2 puff, Inhalation, Q4H PRN, Socrates Villalobos MD, 2 puff at 05/10/19 2217    aluminum-magnesium hydroxide-simethicone (MYLANTA) 200-200-20 mg/5 mL oral suspension 15 mL, 15 mL, Oral, Q4H PRN, Robby Westbrook MD    benzonatate (TESSALON PERLES) capsule 100 mg, 100 mg, Oral, TID PRN, Dusty Thacker MD    benztropine (COGENTIN) injection 1 mg, 1 mg, Intramuscular, Q8H PRN, Robby Westbrook MD    benztropine (COGENTIN) tablet 1 mg, 1 mg, Oral, Q8H PRN, Robby Westbrook MD    enoxaparin (LOVENOX) subcutaneous injection 40 mg, 40 mg, Subcutaneous, Daily, Dusty Thacker MD    fluticasone-vilanterol (BREO ELLIPTA) 200-25 MCG/INH inhaler 1 puff, 1 puff, Inhalation, Daily, Jose Frausto MD, 1 puff at 05/10/19 0820    guaiFENesin (MUCINEX) 12 hr tablet 600 mg, 600 mg, Oral, Q12H [address], Jp Reyna MD, 600 mg at 05/10/19 2057    haloperidol (HALDOL) oral concentrated solution 1 mg, 1 mg, Oral, Q6H PRN, Divya Chatman MD    haloperidol lactate (HALDOL) injection 2 mg, 2 mg, Intramuscular, Q6H PRN, Divya Chatman MD    hydrOXYzine HCL (ATARAX) tablet 25 mg, 25 mg, Oral, Q6H PRN, Divya Chatman MD    levothyroxine tablet 125 mcg, 125 mcg, Oral, Early Morning, Jp Reyna MD, 125 mcg at 05/11/19 0557    magnesium hydroxide (MILK OF MAGNESIA) 400 mg/5 mL oral suspension 30 mL, 30 mL, Oral, Daily PRN, Divya Chatman MD    paliperidone (INVEGA) 24 hr tablet 9 mg, 9 mg, Oral, Daily, Papito Meehan MD    pantoprazole (PROTONIX) EC tablet 40 mg, 40 mg, Oral, Early Morning, Jp Reyna MD, 40 mg at 05/11/19 0557    risperiDONE (RisperDAL M-TABS) dispersible tablet 1 mg, 1 mg, Oral, Q8H PRN, Divya Chatman MD    theophylline (JEF-24) 24 hr capsule 200 mg, 200 mg, Oral, Daily, Jp Reyna MD, 200 mg at 05/10/19 0813    traZODone (DESYREL) tablet 25 mg, 25 mg, Oral, HS PRN, Jp Reyna MD    Current Problem List:    Patient Active Problem List   Diagnosis    S[facility]is [facility] [address] [facility]    Pneumonia of right upper lobe due to infectious organism ([address] )    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[facility] [address] )    Tobacco use disorder, continuous    Bipolar disorder ([address] )    Left hip pain    Lactic acidosis    Hypokalemia    Hypomagnesemia    Compression fracture of L4 lumbar vertebra    Thoracic compression fracture (HCC)    Ventral hernia    Parapneumonic effusion    Acute on chronic respiratory failure with hypoxia (HCC)    Chronic respiratory failure (HCC)    Hypophosphatemia    Elevated MCV    Compression deformity of vertebra    Schizoaffective disorder, bipolar type (HCC)    Acquired hypothyroidism    Gastroesophageal reflux disease without esophagitis    Abnormal CT of the chest    Excessive cerumen in left ear canal    Lipoma of right upper extremity    Polydipsia    Localized swelling of both lower legs       Problem list reviewed 05/11/19     Objective:     Vital Signs:  Vitals:    05/10/19 0943 05/10/19 1454 05/10/19 2108 05/11/19 0709   BP: 108/67 111/62 120/66 109/53   BP Location:  Right arm Left arm Left arm   Pulse: 82 94 91 83   Resp:  16 18 16   Temp:  97 6 °F (36 4 °C) 98 5 °F (36 9 °C) 97 7 °F (36 5 °C)   TempSrc:  Temporal Tympanic Temporal   SpO2: 96% 96% 95% 98%   Weight:       Height:             Appearance:  age appropriate, casually dressed and disheveled   Behavior:  normal   Speech:  normal volume   Mood:  constricted   Affect:  labile   Thought Process:  normal   Thought Content:  normal   Perceptual Disturbances: None   Risk Potential: none   Sensorium:  person, place and time   Cognition:  intact   Consciousness:  alert and awake    Attention: attention span and concentration were age appropriate   Intellect: average   Insight:  limited   Judgment: limited      Motor Activity: no abnormal movements       I/O Past 24 hours:  I/O last 3 completed shifts: In: 760 [P O :760]  Out: -   No intake/output data recorded  Labs:  Reviewed 05/11/19    Progress Toward Goals:  Unchanged, ongoing mood lability and lack of insight    Assessment / Plan:     Schizoaffective disorder, bipolar type ([facility] [address] )    Recommended Treatment:      Medication changes:  1) continue to monitor on increased Invega dosing  Initiate fall precautions    Non-pharmacological treatments  1) Continue with group therapy, milieu therapy and occupational therapy  Safety  1) Safety/communication plan established targeting dynamic risk factors above  2) Risks, benefits, and possible side effects of medications explained to patient and patient verbalizes understanding  Counseling / Coordination of Care    Total floor / unit time spent today 20 minutes   Greater than 50% of total time was spent with the patient and / or family counseling and / or coordination of care  A description of the counseling / coordination of care  Patient's Rights, confidentiality and exceptions to confidentiality, use of automated medical record, [facility] staff access to medical record, and consent to treatment reviewed      Cheryl Davis PA-C

## 2019-05-11 NOTE — PROGRESS NOTES
Patient remains isolative th her room  She is interactive with staff and her room mate  She is on 3 liters of oxygen  She did require a dose of ventolin for chest tightness with relief  Incentive spirometer teaching was given because patient is refusing to use  Patient agreed to use tomorrow  Lungs are clear and decreased  She denied any needs at this time  Will continue to monitor on q 7 minute checks for safety and support

## 2019-05-11 NOTE — PROGRESS NOTES
[address] [facility] Inpatient Geriatric Psychiatry  Psychiatrists  Progress Note    Patient Name: Merly Galaviz  MRN: [id number]  DOS: 05/10/19     Chief Complaint:  paranoid delusions    Interval History: Per staff, patient has been isolative, cooperative  She denies suicidal thoughts though feels hopeless about her living situation  She fired her ACT team today  Though she has no overt delusions, she has a general mistrust about people who are caring for her - she refuses to pay the rent to her residence, refusing to pick a rep payee, and refusing to work with her ACT team  She has been compliant with paliperidone for the past 2 days  She agrees with eventual transition to invega sustenna  Denies AH  Medication noncompliant  Adverse effects of medications: none reported      Mental Status Exam [Per above +]  Appearance: disheveled though she has better hygiene as she showered today with encouragement  Behavior: calm, superficially engaged  Speech: wnl  Mood: hopeless  Affect: neutral, constricted, stable  Thought process: tangential, overinclusive  Thought content: mistrustful of all support staff among community resources  Perceptual disturbances: denies AH/VH  Cognition: oriented to all domains     Insight: poor  Judgement: poor        Current Facility-Administered Medications:     acetaminophen (TYLENOL) tablet 650 mg, 650 mg, Oral, Q6H PRN, Sandy Acosta MD    albuterol (PROVENTIL HFA,VENTOLIN HFA) inhaler 2 puff, 2 puff, Inhalation, Q4H PRN, Maria Isabel Vega MD, 2 puff at 05/10/19 1326    aluminum-magnesium hydroxide-simethicone (MYLANTA) 200-200-20 mg/5 mL oral suspension 15 mL, 15 mL, Oral, Q4H PRN, Kaela Mares MD    benzonatate (TESSALON PERLES) capsule 100 mg, 100 mg, Oral, TID PRN, Sandy Acosta MD    benztropine (COGENTIN) injection 1 mg, 1 mg, Intramuscular, Q8H PRN, Kaela Mares MD    benztropine (COGENTIN) tablet 1 mg, 1 mg, Oral, Q8H PRN, Tito Gibbs MD    enoxaparin (LOVENOX) subcutaneous injection 40 mg, 40 mg, Subcutaneous, Daily, James Stewart MD    fluticasone-vilanterol (BREO ELLIPTA) 200-25 MCG/INH inhaler 1 puff, 1 puff, Inhalation, Daily, Carolin Whiteside MD, 1 puff at 05/10/19 0820    guaiFENesin (MUCINEX) 12 hr tablet 600 mg, 600 mg, Oral, Q12H [address], James Stewart MD, 600 mg at 05/10/19 0813    haloperidol (HALDOL) oral concentrated solution 1 mg, 1 mg, Oral, Q6H PRN, Tito Gibbs MD    haloperidol lactate (HALDOL) injection 2 mg, 2 mg, Intramuscular, Q6H PRN, Tito Gibbs MD    hydrOXYzine HCL (ATARAX) tablet 25 mg, 25 mg, Oral, Q6H PRN, Tito Gibbs MD    levothyroxine tablet 125 mcg, 125 mcg, Oral, Early Morning, James Stewart MD, 125 mcg at 05/10/19 6038    magnesium hydroxide (MILK OF MAGNESIA) 400 mg/5 mL oral suspension 30 mL, 30 mL, Oral, Daily PRN, Tito Gibbs MD    [START ON 5/11/2019] paliperidone (INVEGA) 24 hr tablet 9 mg, 9 mg, Oral, Daily, Neal Hobson MD    pantoprazole (PROTONIX) EC tablet 40 mg, 40 mg, Oral, Early Morning, James Stewart MD, 40 mg at 05/10/19 7806    risperiDONE (RisperDAL M-TABS) dispersible tablet 1 mg, 1 mg, Oral, Q8H PRN, Tito Gibbs MD    theophylline (JEF-24) 24 hr capsule 200 mg, 200 mg, Oral, Daily, James Stewart MD, 200 mg at 05/10/19 0813    traZODone (DESYREL) tablet 25 mg, 25 mg, Oral, HS PRN, James Stewart MD    Vitals:    05/10/19 1454   BP: 111/62   Pulse: 94   Resp: 16   Temp: 97 6 °F (36 4 °C)   SpO2: 96%       Lab/work up: no new labs    Assessment:     Axis I:  · Schizoaffective disorder; has been treatment resistant  Axis II:  · OCPD  · Cluster B traits  Axis III:  - recovering from sepsis 2/2 CAP, COPD with asthma, stable compression fracture of L4   Axis IV:  · Limited social support, chronically homeless, financial problems, on disability, noncompliant with treatment    Progress: limited    Plan:   1   Disposition: Patient meets criteria for ongoing acute inpatient treatment due to being danger to self 2/2 psychosis and poor self care  Patient will be discharged when there is an absence of psychosis [id number]  2  Legal status: 303  3  Psychopharmacologic interventions:  - Increase paliperidone to 9mg po daily with plan to eventually transition to ROSALES  - Continue to monitor psychosis  4  Medical comorbidities: Hospitalist following PRN  5  Other therapies: Individual/group/milieu therapy as appropriate   6  Social issues: Case management following to arrange disposition - may need guardianship  7   Precautions: Routine q15min checks, vitals qshift    Abel Khoury MD  05/10/19

## 2019-05-12 RX ADMIN — ALBUTEROL SULFATE 2 PUFF: 90 AEROSOL, METERED RESPIRATORY (INHALATION) at 08:31

## 2019-05-12 RX ADMIN — PALIPERIDONE 6 MG: 3 TABLET, EXTENDED RELEASE ORAL at 08:24

## 2019-05-12 RX ADMIN — LEVOTHYROXINE SODIUM 125 MCG: 125 TABLET ORAL at 05:41

## 2019-05-12 RX ADMIN — PANTOPRAZOLE SODIUM 40 MG: 40 TABLET, DELAYED RELEASE ORAL at 05:41

## 2019-05-12 RX ADMIN — THEOPHYLLINE ANHYDROUS 200 MG: 200 CAPSULE, EXTENDED RELEASE ORAL at 08:26

## 2019-05-12 RX ADMIN — FLUTICASONE FUROATE AND VILANTEROL TRIFENATATE 1 PUFF: 200; 25 POWDER RESPIRATORY (INHALATION) at 08:24

## 2019-05-12 RX ADMIN — ALBUTEROL SULFATE 2 PUFF: 90 AEROSOL, METERED RESPIRATORY (INHALATION) at 12:48

## 2019-05-12 RX ADMIN — ALUMINUM HYDROXIDE, MAGNESIUM HYDROXIDE, AND SIMETHICONE 15 ML: 200; 200; 20 SUSPENSION ORAL at 22:21

## 2019-05-12 NOTE — PROGRESS NOTES
Pt visible on unit, social with select peers  Denies pain  Pt requests her "rescue inhaler" every four hours, does not appear to be in distress  Pt took 6mg of her Invega instead of the 9mg ordered bc the Myrna Blakely is making her "weak"and "I'm not taking it until I talk to my doctor"  When RN explained that it was Dr Jose Guzman who increased the Myrna Blakely, pt asks "Does Dr Tyree Angulo know about this?"  Refused AM mucinex bc "it's drying me out"  Refused incentive spirometer despite education from RN explaining benefits of using the incentive and what could possibly happen if she continues to refuse  Pt expresses no desire to help herself and states that she is "good with where I am"

## 2019-05-12 NOTE — PLAN OF CARE
Problem: Alteration in Thoughts and Perception  Goal: Treatment Goal: Gain control of psychotic behaviors/thinking, reduce/eliminate presenting symptoms and demonstrate improved reality functioning upon discharge  Outcome: Progressing  Goal: Verbalize thoughts and feelings  Description  Interventions:  - Promote a nonjudgmental and trusting relationship with the patient through active listening and therapeutic communication  - Assess patient's level of functioning, behavior and potential for risk  - Engage patient in 1 on 1 interactions for a minimum of 15 minutes each session  - Encourage patient to express fears, feelings, frustrations, and discuss symptoms    - [place] patient to reality, help patient recognize reality-based thinking   - Administer medications as ordered and assess for potential side effects  - Provide the patient education related to the signs and symptoms of the illness and desired effects of prescribed medications  Outcome: Progressing  Goal: Refrain from acting on delusional thinking/internal stimuli  Description  Interventions:  - Monitor patient closely, per order   - Utilize least restrictive measures   - Set reasonable limits, give positive feedback for acceptable   - Administer medications as ordered and monitor of potential side effects  Outcome: Progressing  Goal: Agree to be compliant with medication regime, as prescribed and report medication side effects  Description  Interventions:  - Offer appropriate PRN medication and supervise ingestion; conduct aims, as needed   Outcome: Progressing  Goal: Attend and participate in unit activities, including therapeutic, recreational, and educational groups  Description  Interventions:  - Provide therapeutic and educational activities daily, encourage attendance and participation, and document same in the medical record   Outcome: Progressing  Goal: Recognize dysfunctional thoughts, communicate reality-based thoughts at the time of discharge  Description  Interventions:  - Provide medication and psycho-education to assist patient in compliance and developing insight into his/her illness   Outcome: Progressing  Goal: Complete daily ADLs, including personal hygiene independently, as able  Description  Interventions:  - Observe, teach, and assist patient with ADLS  - Monitor and promote a balance of rest/activity, with adequate nutrition and elimination   Outcome: Progressing

## 2019-05-12 NOTE — PROGRESS NOTES
Psychiatry Progress Note    Subjective: Interval History     Patient yesterday refusing to take and 9 mg of Invega, would only comply with 6 mg dosing  Patient this morning again reporting that she is only going to take the 9 mg  Patient reports that she has been having ongoing difficulty with her breathing and that she believes the [place] is the cause  Patient however has recently been treated for pneumonia and has been refusing to take her Mucinex and utilize her incentive spirometer  Patient reports that she feels that mentally she is doing fine and that she does not require any additional psychotropic medication changes  Patient denying all psychiatric symptoms  Patient with no aggressive behaviors in the past 24 hours  No p r n  Medications        Behavior over the last 24 hours:  unchanged  Sleep: normal  Appetite: normal  Medication side effects: No  ROS: no complaints    Current medications:    Current Facility-Administered Medications:     acetaminophen (TYLENOL) tablet 650 mg, 650 mg, Oral, Q6H PRN, Dusty Thacker MD    albuterol (PROVENTIL HFA,VENTOLIN HFA) inhaler 2 puff, 2 puff, Inhalation, Q4H PRN, Socrates Villalobos MD, 2 puff at 05/11/19 1804    aluminum-magnesium hydroxide-simethicone (MYLANTA) 200-200-20 mg/5 mL oral suspension 15 mL, 15 mL, Oral, Q4H PRN, Robby Westbrook MD    benzonatate (TESSALON PERLES) capsule 100 mg, 100 mg, Oral, TID PRN, Dusty Thacker MD    benztropine (COGENTIN) injection 1 mg, 1 mg, Intramuscular, Q8H PRN, Robby Westbrook MD    benztropine (COGENTIN) tablet 1 mg, 1 mg, Oral, Q8H PRN, Robby Westbrook MD    enoxaparin (LOVENOX) subcutaneous injection 40 mg, 40 mg, Subcutaneous, Daily, Dusty Thacker MD    fluticasone-vilanterol (BREO ELLIPTA) 200-25 MCG/INH inhaler 1 puff, 1 puff, Inhalation, Daily, Jose Frausto MD, 1 puff at 05/11/19 0843    guaiFENesin (MUCINEX) 12 hr tablet 600 mg, 600 mg, Oral, Q12H [address], Dusty Thacker MD, 600 mg at 05/11/19 0842   haloperidol (HALDOL) oral concentrated solution 1 mg, 1 mg, Oral, Q6H PRN, Mikal Dang MD    haloperidol lactate (HALDOL) injection 2 mg, 2 mg, Intramuscular, Q6H PRN, Mikal Dang MD    hydrOXYzine HCL (ATARAX) tablet 25 mg, 25 mg, Oral, Q6H PRN, Mikal Dang MD    levothyroxine tablet 125 mcg, 125 mcg, Oral, Early Morning, Lynne Donohue MD, 125 mcg at 05/12/19 0541    magnesium hydroxide (MILK OF MAGNESIA) 400 mg/5 mL oral suspension 30 mL, 30 mL, Oral, Daily PRN, Mikal Dang MD    paliperidone (INVEGA) 24 hr tablet 9 mg, 9 mg, Oral, Daily, Jenniffer Pimentel MD, 6 mg at 05/11/19 0842    pantoprazole (PROTONIX) EC tablet 40 mg, 40 mg, Oral, Early Morning, Lynne Donohue MD, 40 mg at 05/12/19 0541    risperiDONE (RisperDAL M-TABS) dispersible tablet 1 mg, 1 mg, Oral, Q8H PRN, Mikal Dang MD    theophylline (JEF-24) 24 hr capsule 200 mg, 200 mg, Oral, Daily, Lynne Donohue MD, 200 mg at 05/11/19 8571    traZODone (DESYREL) tablet 25 mg, 25 mg, Oral, HS PRN, Lynne Donohue MD    Current Problem List:    Patient Active Problem List   Diagnosis    Sepsis ([facility] [address] )    Pneumonia of right upper lobe due to infectious organism ([facility] [address] )    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[facility] [address] )    Tobacco use disorder, continuous    Bipolar disorder ([facility] [address] )    Left hip pain    Lactic acidosis    Hypokalemia    Hypomagnesemia    Compression fracture of L4 lumbar vertebra    Thoracic compression fracture (HCC)    Ventral hernia    Parapneumonic effusion    Acute on chronic respiratory failure with hypoxia (HCC)    Chronic respiratory failure (HCC)    Hypophosphatemia    Elevated MCV    Compression deformity of vertebra    Schizoaffective disorder, bipolar type (HCC)    Acquired hypothyroidism    Gastroesophageal reflux disease without esophagitis    Abnormal CT of the chest    Excessive cerumen in left ear canal    Lipoma of right upper extremity    Polydipsia    Localized swelling of both lower legs       Problem list reviewed 05/12/19     Objective:     Vital Signs:  Vitals:    05/11/19 0709 05/11/19 1500 05/11/19 2045 05/12/19 0702   BP: 109/53 109/61 108/59 101/59   BP Location: Left arm  Left arm Left arm   Pulse: 83 95 95 75   Resp: 16 16 16 20   Temp: 97 7 °F (36 5 °C) 98 7 °F (37 1 °C) 97 8 °F (36 6 °C) 97 8 °F (36 6 °C)   TempSrc: Temporal Temporal Temporal Temporal   SpO2: 98% 96% 96% 95%   Weight:       Height:             Appearance:  age appropriate, casually dressed and disheveled   Behavior:  normal   Speech:  normal volume   Mood:  constricted   Affect:  labile   Thought Process:  normal   Thought Content:  normal   Perceptual Disturbances: None   Risk Potential: none   Sensorium:  person, place and time   Cognition:  intact   Consciousness:  alert and awake    Attention: attention span and concentration were age appropriate   Intellect: average   Insight:  limited   Judgment: limited      Motor Activity: no abnormal movements       I/O Past 24 hours:  I/O last 3 completed shifts: In: 1200 [P O :1200]  Out: -   No intake/output data recorded  Labs:  Reviewed 05/12/19    Progress Toward Goals:  Unchanged, ongoing mood lability and lack of insight    Assessment / Plan:     Schizoaffective disorder, bipolar type ([facility] [address] )    Recommended Treatment:      Medication changes:  1) continue to provide education and support for increased Invega dosing  Non-pharmacological treatments  1) Continue with group therapy, milieu therapy and occupational therapy  Safety  1) Safety/communication plan established targeting dynamic risk factors above  2) Risks, benefits, and possible side effects of medications explained to patient and patient verbalizes understanding  Counseling / Coordination of Care    Total floor / unit time spent today 20 minutes  Greater than 50% of total time was spent with the patient and / or family counseling and / or coordination of care   A description of the counseling / coordination of care      Patient's Rights, confidentiality and exceptions to confidentiality, use of automated medical record, [address] [facility] staff access to medical record, and consent to treatment reviewed      Akilah De Los Santos PA-C

## 2019-05-12 NOTE — PROGRESS NOTES
Patient remains in her room  She does come out for meals and snacks  She continues to refuse to use her incentive spiromete  She also refused her mucinex  this evening  She did request one dose of ventolin  Vitals are stable  She continues on 3 liters of 02  Will continue to monitor on q 7 minute checks

## 2019-05-13 PROCEDURE — 99232 SBSQ HOSP IP/OBS MODERATE 35: CPT | Performed by: PSYCHIATRY & NEUROLOGY

## 2019-05-13 RX ADMIN — THEOPHYLLINE ANHYDROUS 200 MG: 200 CAPSULE, EXTENDED RELEASE ORAL at 09:05

## 2019-05-13 RX ADMIN — ALBUTEROL SULFATE 2 PUFF: 90 AEROSOL, METERED RESPIRATORY (INHALATION) at 23:11

## 2019-05-13 RX ADMIN — ALBUTEROL SULFATE 2 PUFF: 90 AEROSOL, METERED RESPIRATORY (INHALATION) at 17:51

## 2019-05-13 RX ADMIN — ALBUTEROL SULFATE 2 PUFF: 90 AEROSOL, METERED RESPIRATORY (INHALATION) at 13:18

## 2019-05-13 RX ADMIN — FLUTICASONE FUROATE AND VILANTEROL TRIFENATATE 1 PUFF: 200; 25 POWDER RESPIRATORY (INHALATION) at 09:04

## 2019-05-13 RX ADMIN — ALBUTEROL SULFATE 2 PUFF: 90 AEROSOL, METERED RESPIRATORY (INHALATION) at 09:12

## 2019-05-13 RX ADMIN — PANTOPRAZOLE SODIUM 40 MG: 40 TABLET, DELAYED RELEASE ORAL at 05:57

## 2019-05-13 RX ADMIN — LEVOTHYROXINE SODIUM 125 MCG: 125 TABLET ORAL at 05:57

## 2019-05-13 NOTE — PROGRESS NOTES
Patient discussed at treatment team this morning  Guardianship paperwork will be started today  Patient will be made aware by Sadie Lyle

## 2019-05-13 NOTE — PLAN OF CARE
Problem: Alteration in Thoughts and Perception  Goal: Treatment Goal: Gain control of psychotic behaviors/thinking, reduce/eliminate presenting symptoms and demonstrate improved reality functioning upon discharge  Outcome: Progressing  Goal: Verbalize thoughts and feelings  Description  Interventions:  - Promote a nonjudgmental and trusting relationship with the patient through active listening and therapeutic communication  - Assess patient's level of functioning, behavior and potential for risk  - Engage patient in 1 on 1 interactions for a minimum of 15 minutes each session  - Encourage patient to express fears, feelings, frustrations, and discuss symptoms    - [place] patient to reality, help patient recognize reality-based thinking   - Administer medications as ordered and assess for potential side effects  - Provide the patient education related to the signs and symptoms of the illness and desired effects of prescribed medications  Outcome: Progressing  Goal: Refrain from acting on delusional thinking/internal stimuli  Description  Interventions:  - Monitor patient closely, per order   - Utilize least restrictive measures   - Set reasonable limits, give positive feedback for acceptable   - Administer medications as ordered and monitor of potential side effects  Outcome: Progressing  Goal: Agree to be compliant with medication regime, as prescribed and report medication side effects  Description  Interventions:  - Offer appropriate PRN medication and supervise ingestion; conduct aims, as needed   Outcome: Not Progressing  Goal: Attend and participate in unit activities, including therapeutic, recreational, and educational groups  Description  Interventions:  - Provide therapeutic and educational activities daily, encourage attendance and participation, and document same in the medical record   Outcome: Not Progressing  Goal: Recognize dysfunctional thoughts, communicate reality-based thoughts at the time of discharge  Description  Interventions:  - Provide medication and psycho-education to assist patient in compliance and developing insight into his/her illness   Outcome: Not Progressing  Goal: Complete daily ADLs, including personal hygiene independently, as able  Description  Interventions:  - Observe, teach, and assist patient with ADLS  - Monitor and promote a balance of rest/activity, with adequate nutrition and elimination   Outcome: Not Progressing     Problem: Ineffective Coping  Goal: Cooperates with admission process  Description  Interventions:   - Complete admission process  Outcome: Progressing  Goal: Identifies ineffective coping skills  Outcome: Not Progressing  Goal: Identifies healthy coping skills  Outcome: Not Progressing  Goal: Demonstrates healthy coping skills  Outcome: Not Progressing  Goal: Participates in unit activities  Description  Interventions:  - Provide therapeutic environment   - Provide required programming   - Redirect inappropriate behaviors   Outcome: Not Progressing  Goal: Patient/Family participate in treatment and DC plans  Description  Interventions:  - Provide therapeutic environment  Outcome: Not Progressing  Goal: Patient/Family verbalizes awareness of resources  Outcome: Progressing  Goal: Understands least restrictive measures  Description  Interventions:  - Utilize least restrictive behavior  Outcome: Progressing  Goal: Free from restraint events  Description  - Utilize least restrictive measures   - Provide behavioral interventions   - Redirect inappropriate behaviors   Outcome: Progressing     Problem: Risk for Self Injury/Neglect  Goal: Treatment Goal: Remain safe during length of stay, learn and adopt new coping skills, and be free of self-injurious ideation, impulses and acts at the time of discharge  Outcome: Not Progressing  Goal: Verbalize thoughts and feelings  Description  Interventions:  - Assess and re-assess patient's lethality and potential for self-injury  - Engage patient in 1:1 interactions, daily, for a minimum of 15 minutes  - Encourage patient to express feelings, fears, frustrations, hopes  - Establish rapport/trust with patient   Outcome: Not Progressing  Goal: Refrain from harming self  Description  Interventions:  - Monitor patient closely, per order  - Develop a trusting relationship  - Supervise medication ingestion, monitor effects and side effects   Outcome: Not Progressing  Goal: Attend and participate in unit activities, including therapeutic, recreational, and educational groups  Description  Interventions:  - Provide therapeutic and educational activities daily, encourage attendance and participation, and document same in the medical record  - Obtain collateral information, encourage visitation and family involvement in care   Outcome: Not Progressing  Goal: Recognize maladaptive responses and adopt new coping mechanisms  Outcome: Not Progressing  Goal: Complete daily ADLs, including personal hygiene independently, as able  Description  Interventions:  - Observe, teach, and assist patient with ADLS  - Monitor and promote a balance of rest/activity, with adequate nutrition and elimination  Outcome: Not Progressing     Problem: Depression  Goal: Treatment Goal: Demonstrate behavioral control of depressive symptoms, verbalize feelings of improved mood/affect, and adopt new coping skills prior to discharge  Outcome: Not Progressing  Goal: Verbalize thoughts and feelings  Description  Interventions:  - Assess and re-assess patient's level of risk   - Engage patient in 1:1 interactions, daily, for a minimum of 15 minutes   - Encourage patient to express feelings, fears, frustrations, hopes   Outcome: Not Progressing  Goal: Refrain from harming self  Description  Interventions:  - Monitor patient closely, per order   - Supervise medication ingestion, monitor effects and side effects   Outcome: Not Progressing  Goal: Refrain from isolation  Description  Interventions:  - Develop a trusting relationship   - Encourage socialization   Outcome: Not Progressing  Goal: Refrain from self-neglect  Outcome: Not Progressing  Goal: Attend and participate in unit activities, including therapeutic, recreational, and educational groups  Description  Interventions:  - Provide therapeutic and educational activities daily, encourage attendance and participation, and document same in the medical record   Outcome: Not Progressing  Goal: Complete daily ADLs, including personal hygiene independently, as able  Description  Interventions:  - Observe, teach, and assist patient with ADLS  -  Monitor and promote a balance of rest/activity, with adequate nutrition and elimination   Outcome: Not Progressing     Problem: Anxiety  Goal: Anxiety is at manageable level  Description  Interventions:  - Assess and monitor patient's anxiety level  - Monitor for signs and symptoms of anxiety both physical and emotional (heart palpitations, chest pain, shortness of breath, headaches, nausea, feeling jumpy, restlessness, irritable, apprehensive)  - Collaborate with interdisciplinary team and initiate plan and interventions as ordered    - [place] patient to unit/surroundings  - Explain treatment plan  - Encourage participation in care  - Encourage verbalization of concerns/fears  - Identify coping mechanisms  - Assist in developing anxiety-reducing skills  - Administer/offer alternative therapies  - Limit or eliminate stimulants  Outcome: Not Progressing     Problem: DISCHARGE PLANNING - CARE MANAGEMENT  Goal: Discharge to post-acute care or home with appropriate resources  Description  INTERVENTIONS:  - Conduct assessment to determine patient/family and health care team treatment goals, and need for post-acute services based on payer coverage, community resources, and patient preferences, and barriers to discharge  - Address psychosocial, clinical, and financial barriers to discharge as identified in assessment in conjunction with the patient/family and health care team  - Arrange appropriate level of post-acute services according to patients   needs and preference and payer coverage in collaboration with the physician and health care team  - Communicate with and update the patient/family, physician, and health care team regarding progress on the discharge plan  - Arrange appropriate transportation to post-acute venues  Outcome: Not Progressing     Problem: Prexisting or High Potential for Compromised Skin Integrity  Goal: Skin integrity is maintained or improved  Description  INTERVENTIONS:  - Identify patients at risk for skin breakdown  - Assess and monitor skin integrity  - Assess and monitor nutrition and hydration status  - Monitor labs (i e  albumin)  - Assess for incontinence   - Turn and reposition patient  - Assist with mobility/ambulation  - Relieve pressure over bony prominences  - Avoid friction and shearing  - Provide appropriate hygiene as needed including keeping skin clean and dry  - Evaluate need for skin moisturizer/barrier cream  - Collaborate with interdisciplinary team (i e  Nutrition, Rehabilitation, etc )   - Patient/family teaching  Outcome: Progressing

## 2019-05-13 NOTE — PLAN OF CARE
Problem: Alteration in Thoughts and Perception  Goal: Treatment Goal: Gain control of psychotic behaviors/thinking, reduce/eliminate presenting symptoms and demonstrate improved reality functioning upon discharge  Outcome: Progressing  Goal: Verbalize thoughts and feelings  Description  Interventions:  - Promote a nonjudgmental and trusting relationship with the patient through active listening and therapeutic communication  - Assess patient's level of functioning, behavior and potential for risk  - Engage patient in 1 on 1 interactions for a minimum of 15 minutes each session  - Encourage patient to express fears, feelings, frustrations, and discuss symptoms    - [place] patient to reality, help patient recognize reality-based thinking   - Administer medications as ordered and assess for potential side effects  - Provide the patient education related to the signs and symptoms of the illness and desired effects of prescribed medications  Outcome: Progressing  Goal: Refrain from acting on delusional thinking/internal stimuli  Description  Interventions:  - Monitor patient closely, per order   - Utilize least restrictive measures   - Set reasonable limits, give positive feedback for acceptable   - Administer medications as ordered and monitor of potential side effects  Outcome: Progressing  Goal: Agree to be compliant with medication regime, as prescribed and report medication side effects  Description  Interventions:  - Offer appropriate PRN medication and supervise ingestion; conduct aims, as needed   Outcome: Progressing  Goal: Recognize dysfunctional thoughts, communicate reality-based thoughts at the time of discharge  Description  Interventions:  - Provide medication and psycho-education to assist patient in compliance and developing insight into his/her illness   Outcome: Progressing  Goal: Complete daily ADLs, including personal hygiene independently, as able  Description  Interventions:  - Observe, teach, and assist patient with ADLS  - Monitor and promote a balance of rest/activity, with adequate nutrition and elimination   Outcome: Progressing     Problem: Risk for Self Injury/Neglect  Goal: Treatment Goal: Remain safe during length of stay, learn and adopt new coping skills, and be free of self-injurious ideation, impulses and acts at the time of discharge  Outcome: Progressing  Goal: Verbalize thoughts and feelings  Description  Interventions:  - Assess and re-assess patient's lethality and potential for self-injury  - Engage patient in 1:1 interactions, daily, for a minimum of 15 minutes  - Encourage patient to express feelings, fears, frustrations, hopes  - Establish rapport/trust with patient   Outcome: Progressing  Goal: Refrain from harming self  Description  Interventions:  - Monitor patient closely, per order  - Develop a trusting relationship  - Supervise medication ingestion, monitor effects and side effects   Outcome: Progressing  Goal: Recognize maladaptive responses and adopt new coping mechanisms  Outcome: Progressing  Goal: Complete daily ADLs, including personal hygiene independently, as able  Description  Interventions:  - Observe, teach, and assist patient with ADLS  - Monitor and promote a balance of rest/activity, with adequate nutrition and elimination  Outcome: Progressing     Problem: Depression  Goal: Treatment Goal: Demonstrate behavioral control of depressive symptoms, verbalize feelings of improved mood/affect, and adopt new coping skills prior to discharge  Outcome: Progressing  Goal: Verbalize thoughts and feelings  Description  Interventions:  - Assess and re-assess patient's level of risk   - Engage patient in 1:1 interactions, daily, for a minimum of 15 minutes   - Encourage patient to express feelings, fears, frustrations, hopes   Outcome: Progressing  Goal: Refrain from harming self  Description  Interventions:  - Monitor patient closely, per order   - Supervise medication ingestion, monitor effects and side effects   Outcome: Progressing  Goal: Refrain from isolation  Description  Interventions:  - Develop a trusting relationship   - Encourage socialization   Outcome: Progressing  Goal: Refrain from self-neglect  Outcome: Progressing  Goal: Complete daily ADLs, including personal hygiene independently, as able  Description  Interventions:  - Observe, teach, and assist patient with ADLS  -  Monitor and promote a balance of rest/activity, with adequate nutrition and elimination   Outcome: Progressing     Problem: Anxiety  Goal: Anxiety is at manageable level  Description  Interventions:  - Assess and monitor patient's anxiety level  - Monitor for signs and symptoms of anxiety both physical and emotional (heart palpitations, chest pain, shortness of breath, headaches, nausea, feeling jumpy, restlessness, irritable, apprehensive)  - Collaborate with interdisciplinary team and initiate plan and interventions as ordered    - [place] patient to unit/surroundings  - Explain treatment plan  - Encourage participation in care  - Encourage verbalization of concerns/fears  - Identify coping mechanisms  - Assist in developing anxiety-reducing skills  - Administer/offer alternative therapies  - Limit or eliminate stimulants  Outcome: Progressing     Problem: Prexisting or High Potential for Compromised Skin Integrity  Goal: Skin integrity is maintained or improved  Description  INTERVENTIONS:  - Identify patients at risk for skin breakdown  - Assess and monitor skin integrity  - Assess and monitor nutrition and hydration status  - Monitor labs (i e  albumin)  - Assess for incontinence   - Turn and reposition patient  - Assist with mobility/ambulation  - Relieve pressure over bony prominences  - Avoid friction and shearing  - Provide appropriate hygiene as needed including keeping skin clean and dry  - Evaluate need for skin moisturizer/barrier cream  - Collaborate with interdisciplinary team (i e  Nutrition, Rehabilitation, etc )   - Patient/family teaching  Outcome: Progressing

## 2019-05-13 NOTE — PROGRESS NOTES
Pt resistive to spending time out of room  Refused 0900 invega, lovenox and mucinex, Dr Misha Franco notified  VSS  Appetite fair  Irritable and entitled with poor insight  Monitored for safety and support

## 2019-05-13 NOTE — PROGRESS NOTES
Patient is very O2 dependant  Wants oxygen when getting OOB to urinate briefly  Resists getting OOB except at mealtime  When up, she wants to return to bed quickly  She refused evening Mucinex, stating "I am too dry " Refusing deep breathing via Inc Spirometer or without apparatus  PRN of inhaler  Resists education  Judgemental of peers and staff  Pleasantly  Negative   Talking with roommate at 1am   Will monitor

## 2019-05-14 PROCEDURE — 99231 SBSQ HOSP IP/OBS SF/LOW 25: CPT | Performed by: PSYCHIATRY & NEUROLOGY

## 2019-05-14 RX ADMIN — PANTOPRAZOLE SODIUM 40 MG: 40 TABLET, DELAYED RELEASE ORAL at 06:14

## 2019-05-14 RX ADMIN — ALBUTEROL SULFATE 2 PUFF: 90 AEROSOL, METERED RESPIRATORY (INHALATION) at 12:47

## 2019-05-14 RX ADMIN — LEVOTHYROXINE SODIUM 125 MCG: 125 TABLET ORAL at 06:14

## 2019-05-14 RX ADMIN — ALBUTEROL SULFATE 2 PUFF: 90 AEROSOL, METERED RESPIRATORY (INHALATION) at 08:24

## 2019-05-14 RX ADMIN — ALBUTEROL SULFATE 2 PUFF: 90 AEROSOL, METERED RESPIRATORY (INHALATION) at 17:53

## 2019-05-14 RX ADMIN — THEOPHYLLINE ANHYDROUS 200 MG: 200 CAPSULE, EXTENDED RELEASE ORAL at 08:22

## 2019-05-14 RX ADMIN — ALBUTEROL SULFATE 2 PUFF: 90 AEROSOL, METERED RESPIRATORY (INHALATION) at 22:23

## 2019-05-14 RX ADMIN — FLUTICASONE FUROATE AND VILANTEROL TRIFENATATE 1 PUFF: 200; 25 POWDER RESPIRATORY (INHALATION) at 08:23

## 2019-05-14 NOTE — PROGRESS NOTES
Pt present on the unit, and out of room for meals  Pt continues on O2 @3L  Pt refused mucinex and stated she's been taking mucinex for too long and it is drying her up  No resp concerns noted this shift  Pt given prn rescue at her request and it was effective

## 2019-05-14 NOTE — PLAN OF CARE
Problem: Alteration in Thoughts and Perception  Goal: Treatment Goal: Gain control of psychotic behaviors/thinking, reduce/eliminate presenting symptoms and demonstrate improved reality functioning upon discharge  Outcome: Progressing  Goal: Verbalize thoughts and feelings  Description  Interventions:  - Promote a nonjudgmental and trusting relationship with the patient through active listening and therapeutic communication  - Assess patient's level of functioning, behavior and potential for risk  - Engage patient in 1 on 1 interactions for a minimum of 15 minutes each session  - Encourage patient to express fears, feelings, frustrations, and discuss symptoms    - [place] patient to reality, help patient recognize reality-based thinking   - Administer medications as ordered and assess for potential side effects  - Provide the patient education related to the signs and symptoms of the illness and desired effects of prescribed medications  Outcome: Progressing  Goal: Refrain from acting on delusional thinking/internal stimuli  Description  Interventions:  - Monitor patient closely, per order   - Utilize least restrictive measures   - Set reasonable limits, give positive feedback for acceptable   - Administer medications as ordered and monitor of potential side effects  Outcome: Progressing  Goal: Agree to be compliant with medication regime, as prescribed and report medication side effects  Description  Interventions:  - Offer appropriate PRN medication and supervise ingestion; conduct aims, as needed   Outcome: Progressing  Goal: Recognize dysfunctional thoughts, communicate reality-based thoughts at the time of discharge  Description  Interventions:  - Provide medication and psycho-education to assist patient in compliance and developing insight into his/her illness   Outcome: Progressing  Goal: Complete daily ADLs, including personal hygiene independently, as able  Description  Interventions:  - Observe, teach, and assist patient with ADLS  - Monitor and promote a balance of rest/activity, with adequate nutrition and elimination   Outcome: Progressing     Problem: Risk for Self Injury/Neglect  Goal: Treatment Goal: Remain safe during length of stay, learn and adopt new coping skills, and be free of self-injurious ideation, impulses and acts at the time of discharge  Outcome: Progressing  Goal: Verbalize thoughts and feelings  Description  Interventions:  - Assess and re-assess patient's lethality and potential for self-injury  - Engage patient in 1:1 interactions, daily, for a minimum of 15 minutes  - Encourage patient to express feelings, fears, frustrations, hopes  - Establish rapport/trust with patient   Outcome: Progressing  Goal: Refrain from harming self  Description  Interventions:  - Monitor patient closely, per order  - Develop a trusting relationship  - Supervise medication ingestion, monitor effects and side effects   Outcome: Progressing  Goal: Recognize maladaptive responses and adopt new coping mechanisms  Outcome: Progressing  Goal: Complete daily ADLs, including personal hygiene independently, as able  Description  Interventions:  - Observe, teach, and assist patient with ADLS  - Monitor and promote a balance of rest/activity, with adequate nutrition and elimination  Outcome: Progressing     Problem: Depression  Goal: Treatment Goal: Demonstrate behavioral control of depressive symptoms, verbalize feelings of improved mood/affect, and adopt new coping skills prior to discharge  Outcome: Progressing  Goal: Verbalize thoughts and feelings  Description  Interventions:  - Assess and re-assess patient's level of risk   - Engage patient in 1:1 interactions, daily, for a minimum of 15 minutes   - Encourage patient to express feelings, fears, frustrations, hopes   Outcome: Progressing  Goal: Refrain from harming self  Description  Interventions:  - Monitor patient closely, per order   - Supervise medication ingestion, monitor effects and side effects   Outcome: Progressing  Goal: Refrain from isolation  Description  Interventions:  - Develop a trusting relationship   - Encourage socialization   Outcome: Progressing  Goal: Refrain from self-neglect  Outcome: Progressing  Goal: Complete daily ADLs, including personal hygiene independently, as able  Description  Interventions:  - Observe, teach, and assist patient with ADLS  -  Monitor and promote a balance of rest/activity, with adequate nutrition and elimination   Outcome: Progressing     Problem: Anxiety  Goal: Anxiety is at manageable level  Description  Interventions:  - Assess and monitor patient's anxiety level  - Monitor for signs and symptoms of anxiety both physical and emotional (heart palpitations, chest pain, shortness of breath, headaches, nausea, feeling jumpy, restlessness, irritable, apprehensive)  - Collaborate with interdisciplinary team and initiate plan and interventions as ordered    - [place] patient to unit/surroundings  - Explain treatment plan  - Encourage participation in care  - Encourage verbalization of concerns/fears  - Identify coping mechanisms  - Assist in developing anxiety-reducing skills  - Administer/offer alternative therapies  - Limit or eliminate stimulants  Outcome: Progressing     Problem: Prexisting or High Potential for Compromised Skin Integrity  Goal: Skin integrity is maintained or improved  Description  INTERVENTIONS:  - Identify patients at risk for skin breakdown  - Assess and monitor skin integrity  - Assess and monitor nutrition and hydration status  - Monitor labs (i e  albumin)  - Assess for incontinence   - Turn and reposition patient  - Assist with mobility/ambulation  - Relieve pressure over bony prominences  - Avoid friction and shearing  - Provide appropriate hygiene as needed including keeping skin clean and dry  - Evaluate need for skin moisturizer/barrier cream  - Collaborate with interdisciplinary team (i e  Nutrition, Rehabilitation, etc )   - Patient/family teaching  Outcome: Progressing

## 2019-05-14 NOTE — PROGRESS NOTES
Pt with good appetite and using rw with standby prn  Pox 98 % with O2 via nc at 2 liters/min  VSS  Pt refused lovenox, invega and mucinex  Interactive with select peers  Isolative at times  Monitored for safety and support

## 2019-05-14 NOTE — PROGRESS NOTES
Monitored on Q 7 minute safety checks  Currently observed with nasal cannula 02 at 3L   Appears to be resting calmly, eyes closed and respirations noted  Refusing incentive spirometry

## 2019-05-14 NOTE — PROGRESS NOTES
[facility]iatry  Psychiatrists  Progress Note    Patient Name: Hamzah Burger  MRN: [id number]  DOS: 05/13/19     Chief Complaint:  paranoid delusions    Interval History: Per staff, patient has been illogical and attempting to dictate treatment  She continues to insist writer is not her doctor despite being told multiple times she will not get a new physician as her treatment will not change  Refusing incentive spirometer - she states it's not appropriate for her, that it's for people recovering from pneumonia or those who have COPD  She has both  Medication noncompliant - has been taking only 6mg of 9mg dose  She thinks risperidone is causing respiratory problems  Adverse effects of medications: none reported      Mental Status Exam [Per above +]  Appearance: disheveled though hair is better kempt; limited hygiene; no abnormal involuntary movements noted  Behavior: superficially engaged; no psychomotor retardation/agitation  Speech: wnl  Mood: reported as euthymic  Affect: neutral, constricted  Thought process: illogical, tangential  Thought content: no explicit delusions noted; denies SI/HI  Perceptual disturbances: denies AH/VH  Cognition: oriented to all domains     Insight: limited  Judgement: poor        Current Facility-Administered Medications:     acetaminophen (TYLENOL) tablet 650 mg, 650 mg, Oral, Q6H PRN, Norris Carty MD    albuterol (PROVENTIL HFA,VENTOLIN HFA) inhaler 2 puff, 2 puff, Inhalation, Q4H PRN, Ricardo Her MD, 2 puff at 05/13/19 1751    aluminum-magnesium hydroxide-simethicone (MYLANTA) 200-200-20 mg/5 mL oral suspension 15 mL, 15 mL, Oral, Q4H PRN, Sherrie Jimenez MD, 15 mL at 05/12/19 2221    benzonatate (TESSALON PERLES) capsule 100 mg, 100 mg, Oral, TID PRN, Norris Carty MD    benztropine (COGENTIN) injection 1 mg, 1 mg, Intramuscular, Q8H PRN, Sherrie Jimenez MD    benztropine (COGENTIN) tablet 1 mg, 1 mg, Oral, Q8H PRN, Elysia So MD    enoxaparin (LOVENOX) subcutaneous injection 40 mg, 40 mg, Subcutaneous, Daily, Darci Lee MD    fluticasone-vilanterol (BREO ELLIPTA) 200-25 MCG/INH inhaler 1 puff, 1 puff, Inhalation, Daily, Rod Copeland MD, 1 puff at 05/13/19 0904    guaiFENesin (MUCINEX) 12 hr tablet 600 mg, 600 mg, Oral, Q12H [address], Darci Lee MD, 600 mg at 05/11/19 0842    haloperidol (HALDOL) oral concentrated solution 1 mg, 1 mg, Oral, Q6H PRN, Elysia So MD    haloperidol lactate (HALDOL) injection 2 mg, 2 mg, Intramuscular, Q6H PRN, Elysia So MD    hydrOXYzine HCL (ATARAX) tablet 25 mg, 25 mg, Oral, Q6H PRN, Elysia So MD    levothyroxine tablet 125 mcg, 125 mcg, Oral, Early Morning, Darci Lee MD, 125 mcg at 05/13/19 0557    magnesium hydroxide (MILK OF MAGNESIA) 400 mg/5 mL oral suspension 30 mL, 30 mL, Oral, Daily PRN, Elysia So MD    paliperidone (INVEGA) 24 hr tablet 9 mg, 9 mg, Oral, Daily, Cely Conway MD, 6 mg at 05/12/19 0824    pantoprazole (PROTONIX) EC tablet 40 mg, 40 mg, Oral, Early Morning, Darci Lee MD, 40 mg at 05/13/19 0557    risperiDONE (RisperDAL M-TABS) dispersible tablet 1 mg, 1 mg, Oral, Q8H PRN, Elysia So MD    theophylline (JEF-24) 24 hr capsule 200 mg, 200 mg, Oral, Daily, Darci Lee MD, 200 mg at 05/13/19 2381    traZODone (DESYREL) tablet 25 mg, 25 mg, Oral, HS PRN, Darci Lee MD    Vitals:    05/13/19 1431   BP: 94/51   Pulse: 94   Resp: 20   Temp: 97 9 °F (36 6 °C)   SpO2: 96%       Lab/work up: no new labs    Assessment:     Axis I:  · Schizoaffective disorder; has been treatment resistant  Axis II:  · OCPD  · Cluster B traits  Axis III:  - recovering from sepsis 2/2 CAP, COPD with asthma, stable compression fracture of L4   Axis IV:  · Limited social support, chronically homeless, financial problems, on disability, noncompliant with treatment    Progress: limited    Plan:   1   Disposition: Patient meets criteria for ongoing acute inpatient treatment due to being danger to self 2/2 psychosis and poor self care  Patient will be discharged when there is an absence of psychosis [id number]  2  Legal status: 303  3  Psychopharmacologic interventions:  - continue paliperidone 9mg po daily with plan to eventually transition to ROSALES  - Continue to monitor psychosis  4  Medical comorbidities: Hospitalist following PRN  5  Other therapies: Individual/group/milieu therapy as appropriate   6  Social issues: Case management following to arrange disposition - may need guardianship  7   Precautions: Routine q15min checks, vitals qshift    Lexi Andrew MD  05/13/19

## 2019-05-14 NOTE — CASE MANAGEMENT
Writer met with patient 1:1 to discuss process moving forward  CM explained that the since the patient refusing to appoint anyone repayee, guardian, or POA and is refusing to sign into anywhere the hospital was going to go for guardianship  Patient lacks insight into her mental health and the seriousness of her need for oxygen and what is involved in making sure that she stable housing  Patient is refusing to go back to the [employer], patient states she would rather be homeless  Writer and facility gave the option of going back with home health aides and the patient still refused  Patient stated, "I'll roll my dices with the  and hopes that I will win!" Writer explained that once rights are turned over it is extremely hard to get them back  Patient stated she understood  Writer also explained that guardian in place she will most likely still return to the [address]  Writer explained that she could go back to the [employer] on her own holding onto her rights or go back guardian in place  Patient told Tana Dowling again that she understood and she will take her chances with the   Writer then called hospital  Stacy Dorantes and MD to complete guardianship paperwork with hopes of temporary guardian in place by end of next week  Writer spoke with Nicky Cruz from [facility] who stated that they are okay with her being signed in with a temporary guardian in place  Writer also called Kary Kaur from [facility], awaiting call back  Writer also informed Isamar from [facility] of the plan as well  All parties in agreement that the best interest of the patient is too have a guardian due to rapid readmission and lack of insight  CM will continue to follow and provide services as needed

## 2019-05-15 PROCEDURE — 99231 SBSQ HOSP IP/OBS SF/LOW 25: CPT | Performed by: PSYCHIATRY & NEUROLOGY

## 2019-05-15 PROCEDURE — 97150 GROUP THERAPEUTIC PROCEDURES: CPT

## 2019-05-15 RX ORDER — PALIPERIDONE 6 MG/1
6 TABLET, EXTENDED RELEASE ORAL DAILY
Status: DISCONTINUED | OUTPATIENT
Start: 2019-05-16 | End: 2019-05-23

## 2019-05-15 RX ADMIN — ALBUTEROL SULFATE 2 PUFF: 90 AEROSOL, METERED RESPIRATORY (INHALATION) at 23:30

## 2019-05-15 RX ADMIN — ALBUTEROL SULFATE 2 PUFF: 90 AEROSOL, METERED RESPIRATORY (INHALATION) at 08:41

## 2019-05-15 RX ADMIN — PANTOPRAZOLE SODIUM 40 MG: 40 TABLET, DELAYED RELEASE ORAL at 06:05

## 2019-05-15 RX ADMIN — THEOPHYLLINE ANHYDROUS 200 MG: 200 CAPSULE, EXTENDED RELEASE ORAL at 08:39

## 2019-05-15 RX ADMIN — FLUTICASONE FUROATE AND VILANTEROL TRIFENATATE 1 PUFF: 200; 25 POWDER RESPIRATORY (INHALATION) at 08:40

## 2019-05-15 RX ADMIN — ALBUTEROL SULFATE 2 PUFF: 90 AEROSOL, METERED RESPIRATORY (INHALATION) at 18:32

## 2019-05-15 RX ADMIN — LEVOTHYROXINE SODIUM 125 MCG: 125 TABLET ORAL at 06:05

## 2019-05-15 RX ADMIN — ALBUTEROL SULFATE 2 PUFF: 90 AEROSOL, METERED RESPIRATORY (INHALATION) at 12:57

## 2019-05-15 NOTE — PROGRESS NOTES
[facility]  Psychiatrists  Progress Note    Patient Name: Breanna Cooper  MRN: [id number]  DOS: 05/14/19     Chief Complaint:  paranoid delusions    Interval History: Per staff, patient refused paliperidone this morning  Patient reports it's causing respiratory problems, that it's causing her to feel weak  However, there has been no evidence of decrease respiratory function  Patient is a slightly more paranoid today  She obsesses about not wanting to go back to [place] while guardianship is being pursued  She states that the [place] staff won't even let her go to the hearing, that they may poison her  Medication noncompliant - not taking any paliperidone for the past 2 doses    Adverse effects of medications: reports baseless adverse effects    Mental Status Exam [Per above +]  Appearance: better groomed; adequate hygiene; no abnormal involuntary movements noted  Behavior: quietly oppositional and argumentative; dominates conversation  Speech: pressured though volume appropriate; difficult to interrupt  Mood: unable to ascertain  Affect: irritable but well contained, stable and restricted  Thought process: illogical, tangential  Thought content: paranoid delusions noted; no reports of ideations of self harm or aggression  Perceptual disturbances: no appearance of internal preoccupation  Cognition:  oriented to all domains    Insight: poor  Judgement: poor      Current Facility-Administered Medications:     acetaminophen (TYLENOL) tablet 650 mg, 650 mg, Oral, Q6H PRN, Patrick Dailey MD    albuterol (PROVENTIL HFA,VENTOLIN HFA) inhaler 2 puff, 2 puff, Inhalation, Q4H PRN, Chuyita Andrade MD, 2 puff at 05/14/19 1753    aluminum-magnesium hydroxide-simethicone (MYLANTA) 200-200-20 mg/5 mL oral suspension 15 mL, 15 mL, Oral, Q4H PRN, Devan George MD, 15 mL at 05/12/19 2221    benzonatate (TESSALON PERLES) capsule 100 mg, 100 mg, Oral, TID PRN, Chris Rosenberg MD    benztropine (COGENTIN) injection 1 mg, 1 mg, Intramuscular, Q8H PRN, Cristino Grady MD    benztropine (COGENTIN) tablet 1 mg, 1 mg, Oral, Q8H PRN, Cristino Grady MD    enoxaparin (LOVENOX) subcutaneous injection 40 mg, 40 mg, Subcutaneous, Daily, Chris Rosenberg MD    fluticasone-vilanterol (BREO ELLIPTA) 200-25 MCG/INH inhaler 1 puff, 1 puff, Inhalation, Daily, María Borges MD, 1 puff at 05/14/19 0823    guaiFENesin (MUCINEX) 12 hr tablet 600 mg, 600 mg, Oral, Q12H [address], Chris Rosenberg MD, 600 mg at 05/11/19 0842    haloperidol (HALDOL) oral concentrated solution 1 mg, 1 mg, Oral, Q6H PRN, Cristino Grady MD    haloperidol lactate (HALDOL) injection 2 mg, 2 mg, Intramuscular, Q6H PRN, Cristino Grady MD    hydrOXYzine HCL (ATARAX) tablet 25 mg, 25 mg, Oral, Q6H PRN, Cristino Grady MD    levothyroxine tablet 125 mcg, 125 mcg, Oral, Early Morning, Chris Rosenberg MD, 125 mcg at 05/14/19 3498    magnesium hydroxide (MILK OF MAGNESIA) 400 mg/5 mL oral suspension 30 mL, 30 mL, Oral, Daily PRN, Cristino Grady MD    paliperidone (INVEGA) 24 hr tablet 9 mg, 9 mg, Oral, Daily, Quita Marti MD, 6 mg at 05/12/19 0824    pantoprazole (PROTONIX) EC tablet 40 mg, 40 mg, Oral, Early Morning, Chris Rosenberg MD, 40 mg at 05/14/19 3904    risperiDONE (RisperDAL M-TABS) dispersible tablet 1 mg, 1 mg, Oral, Q8H PRN, Cristino Grady MD    theophylline (JEF-24) 24 hr capsule 200 mg, 200 mg, Oral, Daily, Chris Rosenberg MD, 200 mg at 05/14/19 4878    traZODone (DESYREL) tablet 25 mg, 25 mg, Oral, HS PRN, Chris Rosenberg MD    Vitals:    05/14/19 2043   BP: 115/70   Pulse: 84   Resp: 20   Temp: 98 8 °F (37 1 °C)   SpO2: 98%       Lab/work up: no new labs    Assessment:     Axis I:  · Schizoaffective disorder; has been treatment resistant  Axis II:  · OCPD  · Cluster B traits  Axis III:  - recovering from sepsis 2/2 CAP, COPD with asthma, stable compression fracture of L4   Axis IV:  · Limited social support, chronically homeless, financial problems, on disability, noncompliant with treatment    Progress: limited    Plan:   1  Disposition: Patient meets criteria for ongoing acute inpatient treatment due to being danger to self 2/2 psychosis and poor self care  Patient will be discharged when there is an absence of psychosis [id number]  2  Legal status: 303  3  Psychopharmacologic interventions:  - Consider medicating against patient objection if she continues to refuse treatment  - Continue to monitor psychosis  4  Medical comorbidities: Hospitalist following PRN  5  Other therapies: Individual/group/milieu therapy as appropriate   6  Social issues: Case management following to arrange disposition - pursuing guardianship  7   Precautions: Routine q15min checks, vitals qshift    Camryn Smith MD  05/14/19

## 2019-05-15 NOTE — QUICK NOTE
I was asked by Dr Renner to render a second opinion regarding medications over objection  This patient is well known to me with history of schizoaffective disorder and noncompliance with treatment  She remains delusional and paranoid with poor judgment and no insight  She cannot be reasoned with and she does not make her decisions based on logic and reasoning    She cannot be treated effectively without pharmacotherapy and medications need to be forced against her will

## 2019-05-15 NOTE — PLAN OF CARE
Problem: OCCUPATIONAL THERAPY ADULT  Goal: Performs self-care activities at highest level of function for planned discharge setting  See evaluation for individualized goals  Description  Treatment Interventions: ADL retraining, Endurance training, Continued evaluation, Activityengagement(coping skills instruction, life management instruction)          See flowsheet documentation for full assessment, interventions and recommendations  Outcome: Progressing  Note:   Limitation: Decreased ADL status, Decreased high-level ADLs, Mood limitation(guarded, limited coping and life management skills)  Prognosis: Fair  Assessment: Juanita Pedraza joined the second half of this OT Crafts as a Coping Strategy session  She was initiating, pleasant  She requested to see the coloring pictures, and she did choose some to work on  She was pleasant and invested in pleasant conversation with this writer  Her affect was friendly, warm  Progress has been noted towards her goals given her willingness to engage in group activity  Encourage her to regularly join OT program as able  Commend her efforts and initiation

## 2019-05-15 NOTE — PROGRESS NOTES
Pt present and cooperative, continues on O2 at 3L  Pt requested rescue Inhaler x2  This shift  No respiratory concerns reported or noted this shift   Pt refused Mucinex

## 2019-05-15 NOTE — OCCUPATIONAL THERAPY NOTE
Occupational Therapy Group Treatment Note      Donta Posey    5/15/2019    Patient Active Problem List   Diagnosis    Sepsis ([facility] [address] )    Pneumonia of right upper lobe due to infectious organism ([address] )    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[address] )    Tobacco use disorder, continuous    Bipolar disorder ([address] )    Left hip pain    Lactic acidosis    Hypokalemia    Hypomagnesemia    Compression fracture of L4 lumbar vertebra    Thoracic compression fracture (HCC)    Ventral hernia    Parapneumonic effusion    Acute on chronic respiratory failure with hypoxia (HCC)    Chronic respiratory failure (HCC)    Hypophosphatemia    Elevated MCV    Compression deformity of vertebra    Schizoaffective disorder, bipolar type ([address] )    Acquired hypothyroidism    Gastroesophageal reflux disease without esophagitis    Abnormal CT of the chest    Excessive cerumen in left ear canal    Lipoma of right upper extremity    Polydipsia    Localized swelling of both lower legs       Past Medical History:   Diagnosis Date    Acid reflux     Anxiety     Asthma     Bipolar 1 disorder (HCC)     Chronic pain disorder     Chronic respiratory failure (HCC)     Compression fracture of fourth lumbar vertebra ([address] )     COPD (chronic obstructive pulmonary disease) (HCC)     Depression     GERD (gastroesophageal reflux disease)     History of home oxygen therapy     Hypothyroidism     Lipoma of upper extremity     Psychiatric illness     Schizoaffective disorder ([address] )     Substance abuse ([address] )     Nicotine    Thoracic compression fracture ([facility] [address] )     Ventral hernia        No past surgical history on file  05/15/19 9937   Assessment   Assessment Paulette Willis joined the second half of this OT Crafts as a Coping Strategy session  She was initiating, pleasant  She requested to see the coloring pictures, and she did choose some to work on  She was pleasant and invested in pleasant conversation with this writer   Her affect was friendly, warm  Progress has been noted towards her goals given her willingness to engage in group activity  Encourage her to regularly join OT program as able  Commend her efforts and initiation  Plan   Treatment Interventions ADL retraining; Endurance training;Continued evaluation; Activityengagement  (coping skills instruction, life management instruction)   Goal Expiration Date 06/06/19   Treatment Day 9   OT Frequency 5x/wk   Tae Liao, OT

## 2019-05-15 NOTE — PROGRESS NOTES
Resting in bed with eyes closed and 02 at 3l via nasal canula  Appears to be resting calmly  Not voicing any complaints  Monitored on Q 7 minute safety checks

## 2019-05-15 NOTE — CASE MANAGEMENT
Patient recently started to refused medications  Due to this, second MD was brought in and reviewed case, determined that med over objection is needed  Guardianship paperwork in process  CM will continue to follow and provide services as needed

## 2019-05-15 NOTE — PROGRESS NOTES
Alert,awake and visible during meals then isolative to her room  Pt denies any AH or VH,denies any thoughts of self harm  Pt denies any depression  Pt refused Lovenox,Mucinex and Invega  Pt stated that she will not take anymore Invega because is causing respiratory problems  Poor insight into her mental illness  No distress noted  Will continue to monitor closely

## 2019-05-15 NOTE — PROGRESS NOTES
Pt discussed at treatment rounds today, no medication changes made today  Pt continues to refuse medications   Awaiting second doctor opinion for medication over objection order

## 2019-05-15 NOTE — CASE MANAGEMENT
[place] paperwork [place]  Hearing to bed held on 5/17  MD aware  CM will continue to follow and provide services as needed

## 2019-05-16 PROCEDURE — 99231 SBSQ HOSP IP/OBS SF/LOW 25: CPT | Performed by: PSYCHIATRY & NEUROLOGY

## 2019-05-16 RX ADMIN — LEVOTHYROXINE SODIUM 125 MCG: 125 TABLET ORAL at 05:51

## 2019-05-16 RX ADMIN — PALIPERIDONE 6 MG: 6 TABLET, FILM COATED, EXTENDED RELEASE ORAL at 08:40

## 2019-05-16 RX ADMIN — PANTOPRAZOLE SODIUM 40 MG: 40 TABLET, DELAYED RELEASE ORAL at 05:51

## 2019-05-16 RX ADMIN — ALBUTEROL SULFATE 2 PUFF: 90 AEROSOL, METERED RESPIRATORY (INHALATION) at 08:41

## 2019-05-16 RX ADMIN — ALBUTEROL SULFATE 2 PUFF: 90 AEROSOL, METERED RESPIRATORY (INHALATION) at 17:41

## 2019-05-16 RX ADMIN — ALBUTEROL SULFATE 2 PUFF: 90 AEROSOL, METERED RESPIRATORY (INHALATION) at 13:18

## 2019-05-16 RX ADMIN — FLUTICASONE FUROATE AND VILANTEROL TRIFENATATE 1 PUFF: 200; 25 POWDER RESPIRATORY (INHALATION) at 08:41

## 2019-05-16 RX ADMIN — THEOPHYLLINE ANHYDROUS 200 MG: 200 CAPSULE, EXTENDED RELEASE ORAL at 08:40

## 2019-05-16 RX ADMIN — ALBUTEROL SULFATE 2 PUFF: 90 AEROSOL, METERED RESPIRATORY (INHALATION) at 22:38

## 2019-05-16 NOTE — PROGRESS NOTES
Pt was up for breakfast in dinning room,pleasant and brighter on approach  Suspicious with her Invega this morning, accepted after the medication was taken out of the packaging in front of her  Pt refused Mucinex and Lovenox, education provided  Pt stated ' I don't need any psych medication, I am perfectly fine"  Poor insight into her mental illness  Pt declined groups despite encouragement  No distress noted  Will continue to monitor closely

## 2019-05-16 NOTE — NURSING NOTE
Patient has been visible out of bed for short periods, pleasant and cooperative on approach  Delusional stating having "a tracker on my arm" referring to her right arm  Stating the she has been hearing a conversation transmitted from another patient's hearing device while sitting in the dayroom  Patient stated "I had the tracker place in 1997 because I was into bad things and they wanted to keep me safe"  "I hear a lot of things good and bad, sometimes I take it off"  Patient stated the conversation head through the device today was someone telling the other patient "try to make it to the other unit (6T), there you can seduce Dr Patel Martinez"  Remains on 3L O2 continues, refused mucinex this evening  Labs, orders and vital signs have been reviewed  On routine checks, will continue to monitor

## 2019-05-16 NOTE — PROGRESS NOTES
Patient was laying in bed quietly when the shift started  Breathing pattern even and unlabored  On O2 3L continuous  Abulterol inhaler administered at 2330 per patient request  Patient also used her incentive spirometer  Q 7 minutes safety checks ongoing  Will continue to monitor

## 2019-05-16 NOTE — PLAN OF CARE
Problem: Alteration in Thoughts and Perception  Goal: Treatment Goal: Gain control of psychotic behaviors/thinking, reduce/eliminate presenting symptoms and demonstrate improved reality functioning upon discharge  Outcome: Progressing  Goal: Verbalize thoughts and feelings  Description  Interventions:  - Promote a nonjudgmental and trusting relationship with the patient through active listening and therapeutic communication  - Assess patient's level of functioning, behavior and potential for risk  - Engage patient in 1 on 1 interactions for a minimum of 15 minutes each session  - Encourage patient to express fears, feelings, frustrations, and discuss symptoms    - [place] patient to reality, help patient recognize reality-based thinking   - Administer medications as ordered and assess for potential side effects  - Provide the patient education related to the signs and symptoms of the illness and desired effects of prescribed medications  Outcome: Progressing  Goal: Refrain from acting on delusional thinking/internal stimuli  Description  Interventions:  - Monitor patient closely, per order   - Utilize least restrictive measures   - Set reasonable limits, give positive feedback for acceptable   - Administer medications as ordered and monitor of potential side effects  Outcome: Progressing  Goal: Agree to be compliant with medication regime, as prescribed and report medication side effects  Description  Interventions:  - Offer appropriate PRN medication and supervise ingestion; conduct aims, as needed   Outcome: Progressing  Goal: Recognize dysfunctional thoughts, communicate reality-based thoughts at the time of discharge  Description  Interventions:  - Provide medication and psycho-education to assist patient in compliance and developing insight into his/her illness   Outcome: Progressing  Goal: Complete daily ADLs, including personal hygiene independently, as able  Description  Interventions:  - Observe, teach, and assist patient with ADLS  - Monitor and promote a balance of rest/activity, with adequate nutrition and elimination   Outcome: Progressing     Problem: Risk for Self Injury/Neglect  Goal: Treatment Goal: Remain safe during length of stay, learn and adopt new coping skills, and be free of self-injurious ideation, impulses and acts at the time of discharge  Outcome: Progressing  Goal: Verbalize thoughts and feelings  Description  Interventions:  - Assess and re-assess patient's lethality and potential for self-injury  - Engage patient in 1:1 interactions, daily, for a minimum of 15 minutes  - Encourage patient to express feelings, fears, frustrations, hopes  - Establish rapport/trust with patient   Outcome: Progressing  Goal: Refrain from harming self  Description  Interventions:  - Monitor patient closely, per order  - Develop a trusting relationship  - Supervise medication ingestion, monitor effects and side effects   Outcome: Progressing  Goal: Recognize maladaptive responses and adopt new coping mechanisms  Outcome: Progressing  Goal: Complete daily ADLs, including personal hygiene independently, as able  Description  Interventions:  - Observe, teach, and assist patient with ADLS  - Monitor and promote a balance of rest/activity, with adequate nutrition and elimination  Outcome: Progressing     Problem: Depression  Goal: Treatment Goal: Demonstrate behavioral control of depressive symptoms, verbalize feelings of improved mood/affect, and adopt new coping skills prior to discharge  Outcome: Progressing  Goal: Verbalize thoughts and feelings  Description  Interventions:  - Assess and re-assess patient's level of risk   - Engage patient in 1:1 interactions, daily, for a minimum of 15 minutes   - Encourage patient to express feelings, fears, frustrations, hopes   Outcome: Progressing  Goal: Refrain from harming self  Description  Interventions:  - Monitor patient closely, per order   - Supervise medication ingestion, monitor effects and side effects   Outcome: Progressing  Goal: Refrain from isolation  Description  Interventions:  - Develop a trusting relationship   - Encourage socialization   Outcome: Progressing  Goal: Refrain from self-neglect  Outcome: Progressing  Goal: Complete daily ADLs, including personal hygiene independently, as able  Description  Interventions:  - Observe, teach, and assist patient with ADLS  -  Monitor and promote a balance of rest/activity, with adequate nutrition and elimination   Outcome: Progressing     Problem: Anxiety  Goal: Anxiety is at manageable level  Description  Interventions:  - Assess and monitor patient's anxiety level  - Monitor for signs and symptoms of anxiety both physical and emotional (heart palpitations, chest pain, shortness of breath, headaches, nausea, feeling jumpy, restlessness, irritable, apprehensive)  - Collaborate with interdisciplinary team and initiate plan and interventions as ordered    - [place] patient to unit/surroundings  - Explain treatment plan  - Encourage participation in care  - Encourage verbalization of concerns/fears  - Identify coping mechanisms  - Assist in developing anxiety-reducing skills  - Administer/offer alternative therapies  - Limit or eliminate stimulants  Outcome: Progressing     Problem: Prexisting or High Potential for Compromised Skin Integrity  Goal: Skin integrity is maintained or improved  Description  INTERVENTIONS:  - Identify patients at risk for skin breakdown  - Assess and monitor skin integrity  - Assess and monitor nutrition and hydration status  - Monitor labs (i e  albumin)  - Assess for incontinence   - Turn and reposition patient  - Assist with mobility/ambulation  - Relieve pressure over bony prominences  - Avoid friction and shearing  - Provide appropriate hygiene as needed including keeping skin clean and dry  - Evaluate need for skin moisturizer/barrier cream  - Collaborate with interdisciplinary team (i e  Nutrition, Rehabilitation, etc )   - Patient/family teaching  Outcome: Progressing

## 2019-05-16 NOTE — CASE MANAGEMENT
Plan of care changed, referral to [facility] will be completed by Laurie SHANE [facility], and patient aware  CM will continue to follow and provide services as needed

## 2019-05-17 PROCEDURE — 99231 SBSQ HOSP IP/OBS SF/LOW 25: CPT | Performed by: NURSE PRACTITIONER

## 2019-05-17 PROCEDURE — 97150 GROUP THERAPEUTIC PROCEDURES: CPT

## 2019-05-17 RX ADMIN — PALIPERIDONE 6 MG: 6 TABLET, FILM COATED, EXTENDED RELEASE ORAL at 08:54

## 2019-05-17 RX ADMIN — LEVOTHYROXINE SODIUM 125 MCG: 125 TABLET ORAL at 06:16

## 2019-05-17 RX ADMIN — ALBUTEROL SULFATE 2 PUFF: 90 AEROSOL, METERED RESPIRATORY (INHALATION) at 18:54

## 2019-05-17 RX ADMIN — PANTOPRAZOLE SODIUM 40 MG: 40 TABLET, DELAYED RELEASE ORAL at 06:16

## 2019-05-17 RX ADMIN — ALBUTEROL SULFATE 2 PUFF: 90 AEROSOL, METERED RESPIRATORY (INHALATION) at 13:02

## 2019-05-17 RX ADMIN — ALBUTEROL SULFATE 2 PUFF: 90 AEROSOL, METERED RESPIRATORY (INHALATION) at 08:55

## 2019-05-17 RX ADMIN — FLUTICASONE FUROATE AND VILANTEROL TRIFENATATE 1 PUFF: 200; 25 POWDER RESPIRATORY (INHALATION) at 08:54

## 2019-05-17 RX ADMIN — THEOPHYLLINE ANHYDROUS 200 MG: 200 CAPSULE, EXTENDED RELEASE ORAL at 08:54

## 2019-05-17 RX ADMIN — ALBUTEROL SULFATE 2 PUFF: 90 AEROSOL, METERED RESPIRATORY (INHALATION) at 23:20

## 2019-05-17 NOTE — PROGRESS NOTES
Pt isolative to room  OOB for meal and snack  Alert, awake and cooperative with care  Cont on O2 3L cont  Via nasal cannula with no distress noted  Pt continues with paranoid thoughts about [facility] ACT team person that came to visit with her today for a few minutes  Denies SI/HI   VSS

## 2019-05-17 NOTE — PROGRESS NOTES
Pt calm and cooperative  Pt offers no concerns, continues on 3L O2  No s/s of resp distress/SOB noted or reported  Pt denies s/s and refused Mucinex as scheduled

## 2019-05-17 NOTE — PLAN OF CARE
Problem: Alteration in Thoughts and Perception  Goal: Treatment Goal: Gain control of psychotic behaviors/thinking, reduce/eliminate presenting symptoms and demonstrate improved reality functioning upon discharge  Outcome: Progressing  Goal: Verbalize thoughts and feelings  Description  Interventions:  - Promote a nonjudgmental and trusting relationship with the patient through active listening and therapeutic communication  - Assess patient's level of functioning, behavior and potential for risk  - Engage patient in 1 on 1 interactions for a minimum of 15 minutes each session  - Encourage patient to express fears, feelings, frustrations, and discuss symptoms    - [place] patient to reality, help patient recognize reality-based thinking   - Administer medications as ordered and assess for potential side effects  - Provide the patient education related to the signs and symptoms of the illness and desired effects of prescribed medications  Outcome: Progressing  Goal: Refrain from acting on delusional thinking/internal stimuli  Description  Interventions:  - Monitor patient closely, per order   - Utilize least restrictive measures   - Set reasonable limits, give positive feedback for acceptable   - Administer medications as ordered and monitor of potential side effects  Outcome: Progressing  Goal: Agree to be compliant with medication regime, as prescribed and report medication side effects  Description  Interventions:  - Offer appropriate PRN medication and supervise ingestion; conduct aims, as needed   Outcome: Progressing  Goal: Recognize dysfunctional thoughts, communicate reality-based thoughts at the time of discharge  Description  Interventions:  - Provide medication and psycho-education to assist patient in compliance and developing insight into his/her illness   Outcome: Progressing  Goal: Complete daily ADLs, including personal hygiene independently, as able  Description  Interventions:  - Observe, teach, and assist patient with ADLS  - Monitor and promote a balance of rest/activity, with adequate nutrition and elimination   Outcome: Progressing     Problem: Risk for Self Injury/Neglect  Goal: Treatment Goal: Remain safe during length of stay, learn and adopt new coping skills, and be free of self-injurious ideation, impulses and acts at the time of discharge  Outcome: Progressing  Goal: Verbalize thoughts and feelings  Description  Interventions:  - Assess and re-assess patient's lethality and potential for self-injury  - Engage patient in 1:1 interactions, daily, for a minimum of 15 minutes  - Encourage patient to express feelings, fears, frustrations, hopes  - Establish rapport/trust with patient   Outcome: Progressing  Goal: Refrain from harming self  Description  Interventions:  - Monitor patient closely, per order  - Develop a trusting relationship  - Supervise medication ingestion, monitor effects and side effects   Outcome: Progressing  Goal: Recognize maladaptive responses and adopt new coping mechanisms  Outcome: Progressing  Goal: Complete daily ADLs, including personal hygiene independently, as able  Description  Interventions:  - Observe, teach, and assist patient with ADLS  - Monitor and promote a balance of rest/activity, with adequate nutrition and elimination  Outcome: Progressing     Problem: Depression  Goal: Treatment Goal: Demonstrate behavioral control of depressive symptoms, verbalize feelings of improved mood/affect, and adopt new coping skills prior to discharge  Outcome: Progressing  Goal: Verbalize thoughts and feelings  Description  Interventions:  - Assess and re-assess patient's level of risk   - Engage patient in 1:1 interactions, daily, for a minimum of 15 minutes   - Encourage patient to express feelings, fears, frustrations, hopes   Outcome: Progressing  Goal: Refrain from harming self  Description  Interventions:  - Monitor patient closely, per order   - Supervise medication ingestion, monitor effects and side effects   Outcome: Progressing  Goal: Refrain from isolation  Description  Interventions:  - Develop a trusting relationship   - Encourage socialization   Outcome: Progressing  Goal: Refrain from self-neglect  Outcome: Progressing  Goal: Complete daily ADLs, including personal hygiene independently, as able  Description  Interventions:  - Observe, teach, and assist patient with ADLS  -  Monitor and promote a balance of rest/activity, with adequate nutrition and elimination   Outcome: Progressing     Problem: Anxiety  Goal: Anxiety is at manageable level  Description  Interventions:  - Assess and monitor patient's anxiety level  - Monitor for signs and symptoms of anxiety both physical and emotional (heart palpitations, chest pain, shortness of breath, headaches, nausea, feeling jumpy, restlessness, irritable, apprehensive)  - Collaborate with interdisciplinary team and initiate plan and interventions as ordered    - [place] patient to unit/surroundings  - Explain treatment plan  - Encourage participation in care  - Encourage verbalization of concerns/fears  - Identify coping mechanisms  - Assist in developing anxiety-reducing skills  - Administer/offer alternative therapies  - Limit or eliminate stimulants  Outcome: Progressing     Problem: Prexisting or High Potential for Compromised Skin Integrity  Goal: Skin integrity is maintained or improved  Description  INTERVENTIONS:  - Identify patients at risk for skin breakdown  - Assess and monitor skin integrity  - Assess and monitor nutrition and hydration status  - Monitor labs (i e  albumin)  - Assess for incontinence   - Turn and reposition patient  - Assist with mobility/ambulation  - Relieve pressure over bony prominences  - Avoid friction and shearing  - Provide appropriate hygiene as needed including keeping skin clean and dry  - Evaluate need for skin moisturizer/barrier cream  - Collaborate with interdisciplinary team (i e  Nutrition, Rehabilitation, etc )   - Patient/family teaching  Outcome: Progressing

## 2019-05-17 NOTE — PROGRESS NOTES
Progress Note - Behavioral Health   Sarwat Song 66 y o  female MRN: [id number]  Unit/Bed#: [facility] 277-20 Encounter: [id number]    The patient was seen for continuing care and reviewed with treatment team  The staff reports that the patient remains irritable, labile, agitated, and negative regarding care  Cooperative and medication compliant  Ruminates frequently on medications and side effects  During assessment the patient remains labile, irritable, and difficult to redirect  Remains focused on medications that are administered  Continues to be paranoid and delusional in thought  Patient continues to lack insight into illness  Mental Status Evaluation:  Appearance:  Adequate hygiene and grooming   Behavior:  cooperative   Fund of knowledge  Diminished   Speech:   Language: Pressured  No overt abnormality   Mood:  anxious; irritable   Affect:   Associations: labile  Loose Associations   Thought Process:  Circumstantial, Tangential and disorganized   Thought Content:  Paranoid and mistrustful, Delusions of persecution, Obsessive ruminations and Somatic delusions   Perceptual Disturbances: Denies hallucinations and does not appear to be responding to internal stimuli   Risk Potential: No suicidal or homicidal ideation   Orientation  Oriented x 3   Memory Not tested   Attention/Concentration attention span appeared shorter than expected for age   Insight:  No insight   Judgment: Poor judgment   Gait/Station: Not observed   Motor Activity: No abnormal movement noted     Progress Toward Goals: unchanged    Assessment/Plan    Principal Problem:    Schizoaffective disorder, bipolar type ([facility])  Active Problems:    Pneumonia of right upper lobe due to infectious organism ([facility] [address] )    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[facility] [address] )    Acquired hypothyroidism    Gastroesophageal reflux disease without esophagitis      Recommended Treatment: Continue with pharmacotherapy, group therapy, milieu therapy and occupational therapy    The patient will be maintained on the following medications:    Current Facility-Administered Medications:  acetaminophen 650 mg Oral Q6H PRN Mulugeta Martínez MD   albuterol 2 puff Inhalation Q4H PRN Porsha Freedman MD   aluminum-magnesium hydroxide-simethicone 15 mL Oral Q4H PRN Óscar Hill MD   benzonatate 100 mg Oral TID PRN Mulugeta Martínez MD   benztropine 1 mg Intramuscular Q8H PRN Óscar Hill MD   benztropine 1 mg Oral Q8H PRN Óscar Hill MD   enoxaparin 40 mg Subcutaneous Daily Mulugeta Martínez MD   fluticasone-vilanterol 1 puff Inhalation Daily Essie Horn MD   guaiFENesin 600 mg Oral Q12H [address] Mulugeta Martínez MD   haloperidol 1 mg Oral Q6H PRN Óscar Hill MD   haloperidol lactate 2 mg Intramuscular Q6H PRN Óscar Hill MD   hydrOXYzine HCL 25 mg Oral Q6H PRN Óscar Hill MD   levothyroxine 125 mcg Oral Early Morning Mulugeta Martínez MD   magnesium hydroxide 30 mL Oral Daily PRN Óscar Hill MD   paliperidone 6 mg Oral Daily Genesis Guerrero MD   pantoprazole 40 mg Oral Early Morning Mulugeta Martínez MD   risperiDONE 1 mg Oral Q8H PRN Óscar Hill MD   theophylline 200 mg Oral Daily Mulugeta Martínez MD   traZODone 25 mg Oral HS PRN Mulugeta Martínez MD       Risks, benefits and possible side effects of Medications:   Risks, benefits, and possible side effects of medications explained to patient and patient verbalizes understanding

## 2019-05-17 NOTE — OCCUPATIONAL THERAPY NOTE
Occupational Therapy Group Treatment Note      Tita Dejesus    5/17/2019    Patient Active Problem List   Diagnosis    Sepsis ([facility] [address] [facility]    Pneumonia of right upper lobe due to infectious organism ([address] )    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[facility] [address] )    Tobacco use disorder, continuous    Bipolar disorder ([address] )    Left hip pain    Lactic acidosis    Hypokalemia    Hypomagnesemia    Compression fracture of L4 lumbar vertebra    Thoracic compression fracture (HCC)    Ventral hernia    Parapneumonic effusion    Acute on chronic respiratory failure with hypoxia (HCC)    Chronic respiratory failure (HCC)    Hypophosphatemia    Elevated MCV    Compression deformity of vertebra    Schizoaffective disorder, bipolar type ([facility] [address] )    Acquired hypothyroidism    Gastroesophageal reflux disease without esophagitis    Abnormal CT of the chest    Excessive cerumen in left ear canal    Lipoma of right upper extremity    Polydipsia    Localized swelling of both lower legs       Past Medical History:   Diagnosis Date    Acid reflux     Anxiety     Asthma     Bipolar 1 disorder (HCC)     Chronic pain disorder     Chronic respiratory failure (HCC)     Compression fracture of fourth lumbar vertebra ([facility] [address] )     COPD (chronic obstructive pulmonary disease) (HCC)     Depression     GERD (gastroesophageal reflux disease)     History of home oxygen therapy     Hypothyroidism     Lipoma of upper extremity     Psychiatric illness     Schizoaffective disorder ([address] )     Substance abuse ([facility] [address] )     Nicotine    Thoracic compression fracture ([facility] [address] )     Ventral hernia        No past surgical history on file  05/17/19 1105   Assessment   Assessment Johanna Morgan was present, alert, engaged in this second morning OT session, socialization  She was pleasant, friendly, initiating throughout the program  She did carry out some of the morning stretch exercises with the group   She was initiating during current events discussion and this day in history discussion, and her comments were detailed  She appeared to be in good spirits as she listened to a song relevant to "This day in history"  She did pose open-ended questions and requests  She was resistive to nursing staff who did approach her later in the group, but she remained positive and pleasant during her group interactions thereafter  She did seem to enjoy the program  Her attendance and contributions to this program were commended  Continue to promote positive investment in her treatment regime, positive focus and cooperation with caregiver assistance as applicable  Plan   Treatment Interventions ADL retraining;Continued evaluation; Activityengagement  (coping skills instruction, life management instruction)   Goal Expiration Date 06/06/19   Treatment Day 11   OT Frequency 5x/wk   Larissa Ayala OT

## 2019-05-17 NOTE — PLAN OF CARE
Problem: OCCUPATIONAL THERAPY ADULT  Goal: Performs self-care activities at highest level of function for planned discharge setting  See evaluation for individualized goals  Description  Treatment Interventions: ADL retraining, Continued evaluation, Activityengagement(coping skills instruction, life management instruction)          See flowsheet documentation for full assessment, interventions and recommendations  5/17/2019 1618 by Patsy Solano OT  Outcome: Progressing  Note:   Limitation: Decreased ADL status, Decreased high-level ADLs, Mood limitation(guarded, limited coping and life management skills)  Prognosis: Fair  Assessment: Patricio Riggs was present, alert, engaged in this second morning OT session, socialization  She was pleasant, friendly, initiating throughout the program  She did carry out some of the morning stretch exercises with the group  She was initiating during current events discussion and this day in history discussion, and her comments were detailed  She appeared to be in good spirits as she listened to a song relevant to "This day in history"  She did pose open-ended questions and requests  She was resistive to nursing staff who did approach her later in the group, but she remained positive and pleasant during her group interactions thereafter  She did seem to enjoy the program  Her attendance and contributions to this program were commended  Continue to promote positive investment in her treatment regime, positive focus and cooperation with caregiver assistance as applicable

## 2019-05-17 NOTE — PROGRESS NOTES
Progress Note - Behavioral Health   Niyah Ortiz 66 y o  female MRN: [id number]  Unit/Bed#: Rosal[facility] Watkins 243-74 Encounter: [id number]    The patient was seen for continuing care and reviewed with treatment team  Patient was observed visible and social in the milieu  She is pleasant and cooperative upon approach with good eye contact and appropriate affect  She currently denies depression and mild anxiety was noted  She reports good sleep and appetite  She is compliant with medications and no side effects noted at this time  She continues on O2  Mental Status Evaluation:  Appearance:  Adequate hygiene and grooming   Behavior:  cooperative and friendly   Mood:  anxious   Affect: mood-congruent   Speech: Normal rate and Normal volume   Thought Process:  Goal directed and coherent   Thought Content:  Somatic delusions   Perceptual Disturbances: Denies hallucinations and does not appear to be responding to internal stimuli   Risk Potential: No suicidal or homicidal ideation   Attention/Concentration attention span and concentration were age appropriate   Orientation:   Alert and awake   Gait/Station: Not observed   Motor Activity: No abnormal movement noted     Progress Toward Goals:  No change    Assessment/Plan    Principal Problem:    Schizoaffective disorder, bipolar type ([facility])  Active Problems:    Pneumonia of right upper lobe due to infectious organism ([address] )    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[facility] [address] )    Acquired hypothyroidism    Gastroesophageal reflux disease without esophagitis      Recommended Treatment:     1  Continue with pharmacotherapy, group therapy, milieu therapy and occupational therapy  2  Continue with safety protocol per unit  3  No medication changes at this time    The patient will be maintained on the following medications:    Current Facility-Administered Medications:  acetaminophen 650 mg Oral Q6H PRN Mandie Anderson MD   albuterol 2 puff Inhalation Q4H PRN Carolann Hendrix MD   aluminum-magnesium hydroxide-simethicone 15 mL Oral Q4H PRN Robby Westbrook MD   benzonatate 100 mg Oral TID PRN Dusty Thacker MD   benztropine 1 mg Intramuscular Q8H PRN Robby Westbrook MD   benztropine 1 mg Oral Q8H PRN Robby Westbrook MD   enoxaparin 40 mg Subcutaneous Daily Dusty Thacker MD   fluticasone-vilanterol 1 puff Inhalation Daily Jose Frausto MD   guaiFENesin 600 mg Oral Q12H [address] Dusty Thacker MD   haloperidol 1 mg Oral Q6H PRN Robby Westbrook MD   haloperidol lactate 2 mg Intramuscular Q6H PRN Robby Westbrook MD   hydrOXYzine HCL 25 mg Oral Q6H PRN Robby Westbrook MD   levothyroxine 125 mcg Oral Early Morning Dusty Thacker MD   magnesium hydroxide 30 mL Oral Daily PRN Robby Westbrook MD   paliperidone 6 mg Oral Daily Toñito Buckley MD   pantoprazole 40 mg Oral Early Morning Dusty Thacker MD   risperiDONE 1 mg Oral Q8H PRN Robby Westbrook MD   theophylline 200 mg Oral Daily Dusty Thacker MD   traZODone 25 mg Oral HS PRN Dusty Thacker MD       Risks, benefits and possible side effects of Medications:   Risks, benefits, and possible side effects of medications explained to patient and patient verbalizes understanding         ABILIO Dye

## 2019-05-17 NOTE — PROGRESS NOTES
[address] [facility] Geriatric Psychiatry  Psychiatrists  Progress Note    Patient Name: Ta Whitney  MRN: [id number]  DOS: 05/15/2019    Chief Complaint:  paranoid delusions    Interval History: Per staff, patient continues to refuse medications  She is illogical in her explanation of why paliperidone is not the right medication for her  Today she states she is concerned that the medication will cause respiratory distress  Continues to believe she would be poisoned and mistreated at her residence and refuses to return  Medication noncompliant     Adverse effects of medications: reports baseless adverse effects    Mental Status Exam [Per above +]  Appearance: unkempt, fair hygiene; no abnormal involuntary movements noted  Behavior: passive aggressive  Speech: wnl  Mood: anxious  Affect: irritable, restricted  Thought process: illogical  Thought content: paranoid and somatic delusions elicited; denies SI/HI  Perceptual disturbances: denies AH/VH  Cognition: oriented to all domains     Insight: poor  Judgement: poor      Current Facility-Administered Medications:     acetaminophen (TYLENOL) tablet 650 mg, 650 mg, Oral, Q6H PRN, Scott Dias MD    albuterol (PROVENTIL HFA,VENTOLIN HFA) inhaler 2 puff, 2 puff, Inhalation, Q4H PRN, Zane Sweeney MD, 2 puff at 05/16/19 2238    aluminum-magnesium hydroxide-simethicone (MYLANTA) 200-200-20 mg/5 mL oral suspension 15 mL, 15 mL, Oral, Q4H PRN, Francesco Ferris MD, 15 mL at 05/12/19 2221    benzonatate (TESSALON PERLES) capsule 100 mg, 100 mg, Oral, TID PRN, Scott Dias MD    benztropine (COGENTIN) injection 1 mg, 1 mg, Intramuscular, Q8H PRN, Francesco Ferris MD    benztropine (COGENTIN) tablet 1 mg, 1 mg, Oral, Q8H PRN, Francesco Ferris MD    enoxaparin (LOVENOX) subcutaneous injection 40 mg, 40 mg, Subcutaneous, Daily, Scott Dias MD    fluticasone-vilanterol (BREO ELLIPTA) 200-25 MCG/INH inhaler 1 puff, 1 puff, Inhalation, Daily, Benny Spatz, MD, 1 puff at 05/16/19 0841    guaiFENesin (MUCINEX) 12 hr tablet 600 mg, 600 mg, Oral, Q12H [address], Ansley Baker MD, 600 mg at 05/11/19 0842    haloperidol (HALDOL) oral concentrated solution 1 mg, 1 mg, Oral, Q6H PRN, Efren Montana MD    haloperidol lactate (HALDOL) injection 2 mg, 2 mg, Intramuscular, Q6H PRN, Efren Montana MD    hydrOXYzine HCL (ATARAX) tablet 25 mg, 25 mg, Oral, Q6H PRN, Efren Montana MD    levothyroxine tablet 125 mcg, 125 mcg, Oral, Early Morning, Ansley Baker MD, 125 mcg at 05/16/19 0551    magnesium hydroxide (MILK OF MAGNESIA) 400 mg/5 mL oral suspension 30 mL, 30 mL, Oral, Daily PRN, Efren Montana MD    paliperidone (INVEGA) 24 hr tablet 6 mg, 6 mg, Oral, Daily, Christin Toney MD, 6 mg at 05/16/19 0840    pantoprazole (PROTONIX) EC tablet 40 mg, 40 mg, Oral, Early Morning, Ansley Baker MD, 40 mg at 05/16/19 0551    risperiDONE (RisperDAL M-TABS) dispersible tablet 1 mg, 1 mg, Oral, Q8H PRN, Efren Montana MD    theophylline (JEF-24) 24 hr capsule 200 mg, 200 mg, Oral, Daily, Ansley Baker MD, 200 mg at 05/16/19 0840    traZODone (DESYREL) tablet 25 mg, 25 mg, Oral, HS PRN, Ansley Baker MD    Vitals:    05/16/19 2028   BP: 107/62   Pulse: 97   Resp: 17   Temp: 97 6 °F (36 4 °C)   SpO2: 97%       Lab/work up: no new labs    Assessment:     Axis I:  · Schizoaffective disorder; has been treatment resistant  Axis II:  · OCPD  · Cluster B traits  Axis III:  - recovering from sepsis 2/2 CAP, COPD with asthma, stable compression fracture of L4   Axis IV:  · Limited social support, chronically homeless, financial problems, on disability, noncompliant with treatment    Progress: limited    Plan:   1  Disposition: Patient meets criteria for ongoing acute inpatient treatment due to being danger to self 2/2 psychosis and poor self care  Patient will be discharged when there is an absence of psychosis [id number]     2  Legal status: 303  3  Psychopharmacologic interventions:  - patient agrees to take pills instead of IM - restart 6mg po daily  - Continue to monitor psychosis  4  Medical comorbidities: Hospitalist following PRN  5  Other therapies: Individual/group/milieu therapy as appropriate   6  Social issues: Case management following to arrange disposition - pursuing guardianship  7   Precautions: Routine q15min checks, vitals qshift    Annabelle Caraballo MD  05/15/2019

## 2019-05-17 NOTE — PROGRESS NOTES
[address] [facility] Geriatric Psychiatry  Psychiatrists  Progress Note    Patient Name: Ирина Wood  MRN: [id number]  DOS: 05/16/2019    Chief Complaint:  paranoid delusions    Interval History: Per staff, patient continues to be resistant to taking medications  She took 6mg of paliperidone today with much encouragement  She initially expressed concerns that she might develop "anaphylaxis" from the medications  However are arguments eventually evolved to her stating she does not need medications at all  Denies SI/HI or AH/VH  Medication compliance is limited    Adverse effects of medications: reports baseless adverse effects    Mental Status Exam [Per above +]  Appearance: disheveled, poor hygiene; no abnormal involuntary movements noted  Behavior: argumentative, dominates conversation  Speech: wnl  Mood: reported as anxious  Affect: neutral, stable, constrited  Thought process: illogical, overinclusive  Thought content: somatic delusions noted  Perceptual disturbances: denies AH/VH  Cognition: oriented to all domains    Insight: poor  Judgement: poor      Current Facility-Administered Medications:     acetaminophen (TYLENOL) tablet 650 mg, 650 mg, Oral, Q6H PRN, Jp Reyna MD    albuterol (PROVENTIL HFA,VENTOLIN HFA) inhaler 2 puff, 2 puff, Inhalation, Q4H PRN, Giovanni Hernandez MD, 2 puff at 05/16/19 2238    aluminum-magnesium hydroxide-simethicone (MYLANTA) 200-200-20 mg/5 mL oral suspension 15 mL, 15 mL, Oral, Q4H PRN, Dviya Chatman MD, 15 mL at 05/12/19 2221    benzonatate (TESSALON PERLES) capsule 100 mg, 100 mg, Oral, TID PRN, Jp Reyna MD    benztropine (COGENTIN) injection 1 mg, 1 mg, Intramuscular, Q8H PRN, Divya Chatman MD    benztropine (COGENTIN) tablet 1 mg, 1 mg, Oral, Q8H PRN, Divya Chatman MD    enoxaparin (LOVENOX) subcutaneous injection 40 mg, 40 mg, Subcutaneous, Daily, Jp Reyna MD    fluticasone-vilanterol [facility] ELLIPTA) 200-25 MCG/INH inhaler 1 puff, 1 puff, Inhalation, Daily, Christina Pope MD, 1 puff at 05/16/19 0841    guaiFENesin (MUCINEX) 12 hr tablet 600 mg, 600 mg, Oral, Q12H [address], Sania Avila MD, 600 mg at 05/11/19 0842    haloperidol (HALDOL) oral concentrated solution 1 mg, 1 mg, Oral, Q6H PRN, Yudi Harrington MD    haloperidol lactate (HALDOL) injection 2 mg, 2 mg, Intramuscular, Q6H PRN, Yudi Harrington MD    hydrOXYzine HCL (ATARAX) tablet 25 mg, 25 mg, Oral, Q6H PRN, Yudi Harrington MD    levothyroxine tablet 125 mcg, 125 mcg, Oral, Early Morning, Sania Avila MD, 125 mcg at 05/16/19 0551    magnesium hydroxide (MILK OF MAGNESIA) 400 mg/5 mL oral suspension 30 mL, 30 mL, Oral, Daily PRN, Yudi Harrington MD    paliperidone (INVEGA) 24 hr tablet 6 mg, 6 mg, Oral, Daily, Paul Burger MD, 6 mg at 05/16/19 0840    pantoprazole (PROTONIX) EC tablet 40 mg, 40 mg, Oral, Early Morning, Sania Avila MD, 40 mg at 05/16/19 0551    risperiDONE (RisperDAL M-TABS) dispersible tablet 1 mg, 1 mg, Oral, Q8H PRN, Yudi Harrington MD    theophylline (JEF-24) 24 hr capsule 200 mg, 200 mg, Oral, Daily, Sania Avila MD, 200 mg at 05/16/19 0840    traZODone (DESYREL) tablet 25 mg, 25 mg, Oral, HS PRN, Sania Avila MD    Vitals:    05/16/19 2028   BP: 107/62   Pulse: 97   Resp: 17   Temp: 97 6 °F (36 4 °C)   SpO2: 97%       Lab/work up: no new labs    Assessment:     Axis I:  · Schizoaffective disorder; has been treatment resistant  Axis II:  · OCPD  · Cluster B traits  Axis III:  - recovering from sepsis 2/2 CAP, COPD with asthma, stable compression fracture of L4   Axis IV:  · Limited social support, chronically homeless, financial problems, on disability, noncompliant with treatment    Progress: limited    Plan:   1  Disposition: Patient meets criteria for ongoing acute inpatient treatment due to being danger to self 2/2 psychosis and poor self care   Patient will be discharged when there is an absence of psychosis [id number]  2  Legal status: 303  3  Psychopharmacologic interventions:  - continue retitrating paliperidone  - Continue to monitor psychosis  4  Medical comorbidities: Hospitalist following PRN  5  Other therapies: Individual/group/milieu therapy as appropriate   6  Social issues: Case management following to arrange disposition - will pursue state hospitalization instead as patient is increasingly more psychotic  7   Precautions: Routine q15min checks, vitals qshift    Rashawn Anderson MD  05/16/2019

## 2019-05-17 NOTE — OCCUPATIONAL THERAPY NOTE
Occupational Therapy Group Treatment Note      Donta Posey    5/17/2019    Patient Active Problem List   Diagnosis    Sepsis ([facility] )    Pneumonia of right upper lobe due to infectious organism ([facility] )    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[facility] )    Tobacco use disorder, continuous    Bipolar disorder ([facility] )    Left hip pain    Lactic acidosis    Hypokalemia    Hypomagnesemia    Compression fracture of L4 lumbar vertebra    Thoracic compression fracture (HCC)    Ventral hernia    Parapneumonic effusion    Acute on chronic respiratory failure with hypoxia (HCC)    Chronic respiratory failure (HCC)    Hypophosphatemia    Elevated MCV    Compression deformity of vertebra    Schizoaffective disorder, bipolar type ([facility] )    Acquired hypothyroidism    Gastroesophageal reflux disease without esophagitis    Abnormal CT of the chest    Excessive cerumen in left ear canal    Lipoma of right upper extremity    Polydipsia    Localized swelling of both lower legs       Past Medical History:   Diagnosis Date    Acid reflux     Anxiety     Asthma     Bipolar 1 disorder (HCC)     Chronic pain disorder     Chronic respiratory failure (HCC)     Compression fracture of fourth lumbar vertebra ([facility] )     COPD (chronic obstructive pulmonary disease) (HCC)     Depression     GERD (gastroesophageal reflux disease)     History of home oxygen therapy     Hypothyroidism     Lipoma of upper extremity     Psychiatric illness     Schizoaffective disorder ([facility] )     Substance abuse ([facility] )     Nicotine    Thoracic compression fracture ([facility] )     Ventral hernia        No past surgical history on file  05/17/19 1010   Assessment   Assessment Paulette Willis was present, alert, engaged for this full OT Life Management session  She was pleasant, initiating on approach  She was aware of [facility] and [employer], she did explain these community resources to the group  She assisted in choice of group topic, "Chicken [employer] for the Soul"   She discussed great inventions that changed her life, and she talked about medication in general  At times she did hesitate when the group discussed how medications have promoted greater independence in mental health, and she did state that she did not always agree with this but for some people they have been helpful  She was generally organized and initiating when responding to group topic questions and comments  Progress has been consistent towards her goals  Her attendance and group involvement was commended  Plan   Treatment Interventions ADL retraining;Continued evaluation; Activityengagement  (coping skills instruction, life management instruction)   Goal Expiration Date 06/06/19   Treatment Day 11   OT Frequency 5x/wk   Vincent Walker, OT

## 2019-05-17 NOTE — PLAN OF CARE
Problem: OCCUPATIONAL THERAPY ADULT  Goal: Performs self-care activities at highest level of function for planned discharge setting  See evaluation for individualized goals  Description  Treatment Interventions: ADL retraining, Continued evaluation, Activityengagement(coping skills instruction, life management instruction)          See flowsheet documentation for full assessment, interventions and recommendations  Outcome: Progressing  Note:   Limitation: Decreased ADL status, Decreased high-level ADLs, Mood limitation(guarded, limited coping and life management skills)  Prognosis: Fair  Assessment: Jesiska Aj was present, alert, engaged for this full OT Life Management session  She was pleasant, initiating on approach  She was aware of [facility] and [employer], she did explain these community resources to the group  She assisted in choice of group topic, "Chicken Soup for the Soul"  She discussed great inventions that changed her life, and she talked about medication in general  At times she did hesitate when the group discussed how medications have promoted greater independence in mental health, and she did state that she did not always agree with this but for some people they have been helpful  She was generally organized and initiating when responding to group topic questions and comments  Progress has been consistent towards her goals  Her attendance and group involvement was commended

## 2019-05-17 NOTE — PROGRESS NOTES
Alert,awake and more visible on the unit today  Accepted her Radha Fuentes this morning as scheduled, but stated "tell dr Clementina Willis I only doing it for him, and if I die my son should get all the money"  Refused Lovenox and Mucinex  Pt has poor insight into her mental illness  Shower was offered, pt initially refusing because stated" I need 3 staff member to be present for her own safety"  Was explained to pt that one MHT will be helping her  Pt accepted shower when limits were set  Continues on 3L O2 with no distress noted  Noted to be attending OT group  Will continue to monitor closely

## 2019-05-18 PROCEDURE — 99231 SBSQ HOSP IP/OBS SF/LOW 25: CPT | Performed by: NURSE PRACTITIONER

## 2019-05-18 RX ADMIN — ALBUTEROL SULFATE 2 PUFF: 90 AEROSOL, METERED RESPIRATORY (INHALATION) at 22:48

## 2019-05-18 RX ADMIN — PALIPERIDONE 6 MG: 6 TABLET, FILM COATED, EXTENDED RELEASE ORAL at 08:03

## 2019-05-18 RX ADMIN — LEVOTHYROXINE SODIUM 125 MCG: 125 TABLET ORAL at 05:44

## 2019-05-18 RX ADMIN — ALBUTEROL SULFATE 2 PUFF: 90 AEROSOL, METERED RESPIRATORY (INHALATION) at 08:41

## 2019-05-18 RX ADMIN — FLUTICASONE FUROATE AND VILANTEROL TRIFENATATE 1 PUFF: 200; 25 POWDER RESPIRATORY (INHALATION) at 07:53

## 2019-05-18 RX ADMIN — ALBUTEROL SULFATE 2 PUFF: 90 AEROSOL, METERED RESPIRATORY (INHALATION) at 18:08

## 2019-05-18 RX ADMIN — PANTOPRAZOLE SODIUM 40 MG: 40 TABLET, DELAYED RELEASE ORAL at 05:44

## 2019-05-18 RX ADMIN — ALBUTEROL SULFATE 2 PUFF: 90 AEROSOL, METERED RESPIRATORY (INHALATION) at 13:30

## 2019-05-18 RX ADMIN — THEOPHYLLINE ANHYDROUS 200 MG: 200 CAPSULE, EXTENDED RELEASE ORAL at 08:03

## 2019-05-18 NOTE — PROGRESS NOTES
Patient was laying quietly in bed when the shift started  Breathing pattern even and unlabored  Albuterol inhaler was administered at 2320 per patient request and used the incentive spirometer through encouragement from this writer  Patient's oxygen tubing was changed  Q 7 minutes safety checks ongoing   Will continue to monitor

## 2019-05-18 NOTE — NURSING NOTE
Patient was compliant with all medications except for Lovenox and Mucinex  Patient reports she has no mucus and no longer needs the Mucinex  Patient educated on use of Lovenox with risks and benefits explained but patient continued to refuse same  BARBARA Tena notified and new order to discontinue Mucinex  No other orders noted at this time  Patient denies depressive,anxiety symptoms and denies suicidal ideation reporting she is here for medical issues related to her COPD and Asthma  Patient educated on psychiatric unit and goals of same with patient agreeing that she does get depressed at times related to her medical problems  Patient allowed to voice her emotions regarding her conditions  Patient remains on supplemental oxygen at 3 Liters/min with O2 saturation at 97% with no s/s of respiratory distress  Patient did request her inhaler this morning and PRN Ventolin Inhaler given at 0841 as ordered with no wheezing noted post administration  Patient is visible on the unit and interacts with several peers at meal times without difficulty  No new issues noted at this time

## 2019-05-18 NOTE — PLAN OF CARE
Problem: Alteration in Thoughts and Perception  Goal: Treatment Goal: Gain control of psychotic behaviors/thinking, reduce/eliminate presenting symptoms and demonstrate improved reality functioning upon discharge  Outcome: Progressing  Goal: Verbalize thoughts and feelings  Description  Interventions:  - Promote a nonjudgmental and trusting relationship with the patient through active listening and therapeutic communication  - Assess patient's level of functioning, behavior and potential for risk  - Engage patient in 1 on 1 interactions for a minimum of 15 minutes each session  - Encourage patient to express fears, feelings, frustrations, and discuss symptoms    - [place] patient to reality, help patient recognize reality-based thinking   - Administer medications as ordered and assess for potential side effects  - Provide the patient education related to the signs and symptoms of the illness and desired effects of prescribed medications  Outcome: Progressing  Goal: Refrain from acting on delusional thinking/internal stimuli  Description  Interventions:  - Monitor patient closely, per order   - Utilize least restrictive measures   - Set reasonable limits, give positive feedback for acceptable   - Administer medications as ordered and monitor of potential side effects  Outcome: Progressing  Goal: Agree to be compliant with medication regime, as prescribed and report medication side effects  Description  Interventions:  - Offer appropriate PRN medication and supervise ingestion; conduct aims, as needed   Outcome: Progressing  Goal: Attend and participate in unit activities, including therapeutic, recreational, and educational groups  Description  Interventions:  - Provide therapeutic and educational activities daily, encourage attendance and participation, and document same in the medical record   Outcome: Progressing  Goal: Recognize dysfunctional thoughts, communicate reality-based thoughts at the time of discharge  Description  Interventions:  - Provide medication and psycho-education to assist patient in compliance and developing insight into his/her illness   Outcome: Progressing  Goal: Complete daily ADLs, including personal hygiene independently, as able  Description  Interventions:  - Observe, teach, and assist patient with ADLS  - Monitor and promote a balance of rest/activity, with adequate nutrition and elimination   Outcome: Progressing     Problem: Ineffective Coping  Goal: Cooperates with admission process  Description  Interventions:   - Complete admission process  Outcome: Progressing  Goal: Identifies ineffective coping skills  Outcome: Progressing  Goal: Identifies healthy coping skills  Outcome: Progressing  Goal: Demonstrates healthy coping skills  Outcome: Progressing  Goal: Participates in unit activities  Description  Interventions:  - Provide therapeutic environment   - Provide required programming   - Redirect inappropriate behaviors   Outcome: Progressing  Goal: Patient/Family participate in treatment and DC plans  Description  Interventions:  - Provide therapeutic environment  Outcome: Progressing  Goal: Patient/Family verbalizes awareness of resources  Outcome: Progressing  Goal: Understands least restrictive measures  Description  Interventions:  - Utilize least restrictive behavior  Outcome: Progressing  Goal: Free from restraint events  Description  - Utilize least restrictive measures   - Provide behavioral interventions   - Redirect inappropriate behaviors   Outcome: Progressing     Problem: Risk for Self Injury/Neglect  Goal: Treatment Goal: Remain safe during length of stay, learn and adopt new coping skills, and be free of self-injurious ideation, impulses and acts at the time of discharge  Outcome: Progressing  Goal: Verbalize thoughts and feelings  Description  Interventions:  - Assess and re-assess patient's lethality and potential for self-injury  - Engage patient in 1:1 interactions, daily, for a minimum of 15 minutes  - Encourage patient to express feelings, fears, frustrations, hopes  - Establish rapport/trust with patient   Outcome: Progressing  Goal: Refrain from harming self  Description  Interventions:  - Monitor patient closely, per order  - Develop a trusting relationship  - Supervise medication ingestion, monitor effects and side effects   Outcome: Progressing  Goal: Attend and participate in unit activities, including therapeutic, recreational, and educational groups  Description  Interventions:  - Provide therapeutic and educational activities daily, encourage attendance and participation, and document same in the medical record  - Obtain collateral information, encourage visitation and family involvement in care   Outcome: Progressing  Goal: Recognize maladaptive responses and adopt new coping mechanisms  Outcome: Progressing  Goal: Complete daily ADLs, including personal hygiene independently, as able  Description  Interventions:  - Observe, teach, and assist patient with ADLS  - Monitor and promote a balance of rest/activity, with adequate nutrition and elimination  Outcome: Progressing     Problem: Depression  Goal: Treatment Goal: Demonstrate behavioral control of depressive symptoms, verbalize feelings of improved mood/affect, and adopt new coping skills prior to discharge  Outcome: Progressing  Goal: Verbalize thoughts and feelings  Description  Interventions:  - Assess and re-assess patient's level of risk   - Engage patient in 1:1 interactions, daily, for a minimum of 15 minutes   - Encourage patient to express feelings, fears, frustrations, hopes   Outcome: Progressing  Goal: Refrain from harming self  Description  Interventions:  - Monitor patient closely, per order   - Supervise medication ingestion, monitor effects and side effects   Outcome: Progressing  Goal: Refrain from isolation  Description  Interventions:  - Develop a trusting relationship   - Encourage socialization   Outcome: Progressing  Goal: Refrain from self-neglect  Outcome: Progressing  Goal: Attend and participate in unit activities, including therapeutic, recreational, and educational groups  Description  Interventions:  - Provide therapeutic and educational activities daily, encourage attendance and participation, and document same in the medical record   Outcome: Progressing  Goal: Complete daily ADLs, including personal hygiene independently, as able  Description  Interventions:  - Observe, teach, and assist patient with ADLS  -  Monitor and promote a balance of rest/activity, with adequate nutrition and elimination   Outcome: Progressing     Problem: Anxiety  Goal: Anxiety is at manageable level  Description  Interventions:  - Assess and monitor patient's anxiety level  - Monitor for signs and symptoms of anxiety both physical and emotional (heart palpitations, chest pain, shortness of breath, headaches, nausea, feeling jumpy, restlessness, irritable, apprehensive)  - Collaborate with interdisciplinary team and initiate plan and interventions as ordered    - [place] patient to unit/surroundings  - Explain treatment plan  - Encourage participation in care  - Encourage verbalization of concerns/fears  - Identify coping mechanisms  - Assist in developing anxiety-reducing skills  - Administer/offer alternative therapies  - Limit or eliminate stimulants  Outcome: Progressing     Problem: DISCHARGE PLANNING - CARE MANAGEMENT  Goal: Discharge to post-acute care or home with appropriate resources  Description  INTERVENTIONS:  - Conduct assessment to determine patient/family and health care team treatment goals, and need for post-acute services based on payer coverage, community resources, and patient preferences, and barriers to discharge  - Address psychosocial, clinical, and financial barriers to discharge as identified in assessment in conjunction with the patient/family and health care team  - Arrange appropriate level of post-acute services according to patients   needs and preference and payer coverage in collaboration with the physician and health care team  - Communicate with and update the patient/family, physician, and health care team regarding progress on the discharge plan  - Arrange appropriate transportation to post-acute venues  Outcome: Progressing     Problem: Prexisting or High Potential for Compromised Skin Integrity  Goal: Skin integrity is maintained or improved  Description  INTERVENTIONS:  - Identify patients at risk for skin breakdown  - Assess and monitor skin integrity  - Assess and monitor nutrition and hydration status  - Monitor labs (i e  albumin)  - Assess for incontinence   - Turn and reposition patient  - Assist with mobility/ambulation  - Relieve pressure over bony prominences  - Avoid friction and shearing  - Provide appropriate hygiene as needed including keeping skin clean and dry  - Evaluate need for skin moisturizer/barrier cream  - Collaborate with interdisciplinary team (i e  Nutrition, Rehabilitation, etc )   - Patient/family teaching  Outcome: Progressing

## 2019-05-18 NOTE — NURSING NOTE
Patient complaint of wheezing multiple times and asked for her PRN Ventolin Inhaler  PRN Albuterol is ordered q 4 hrs PRN and patient educated on use of same  Patient states she takes it four times a day at home to prevent Asthma attacks and she has been taking this medication like this for approximately 20 years since 1999 or the year 2000  When asked if she has a pulmonologist and if she has seen this MD recently she states she does have one but has not seen them recently  Patient advised that perhaps after discharge she should make an appointment for this issue  Patient was agreeable to this plan  PRN Albuterol given at 1330 this afternoon and at 1808 this evening with relief from wheezing reported by patient  No other issues noted at this time

## 2019-05-19 PROCEDURE — 99231 SBSQ HOSP IP/OBS SF/LOW 25: CPT | Performed by: NURSE PRACTITIONER

## 2019-05-19 RX ADMIN — ALBUTEROL SULFATE 2 PUFF: 90 AEROSOL, METERED RESPIRATORY (INHALATION) at 19:31

## 2019-05-19 RX ADMIN — THEOPHYLLINE ANHYDROUS 200 MG: 200 CAPSULE, EXTENDED RELEASE ORAL at 08:24

## 2019-05-19 RX ADMIN — ALBUTEROL SULFATE 2 PUFF: 90 AEROSOL, METERED RESPIRATORY (INHALATION) at 23:56

## 2019-05-19 RX ADMIN — ALBUTEROL SULFATE 2 PUFF: 90 AEROSOL, METERED RESPIRATORY (INHALATION) at 08:31

## 2019-05-19 RX ADMIN — PANTOPRAZOLE SODIUM 40 MG: 40 TABLET, DELAYED RELEASE ORAL at 06:19

## 2019-05-19 RX ADMIN — PALIPERIDONE 6 MG: 6 TABLET, FILM COATED, EXTENDED RELEASE ORAL at 08:24

## 2019-05-19 RX ADMIN — LEVOTHYROXINE SODIUM 125 MCG: 125 TABLET ORAL at 06:19

## 2019-05-19 RX ADMIN — FLUTICASONE FUROATE AND VILANTEROL TRIFENATATE 1 PUFF: 200; 25 POWDER RESPIRATORY (INHALATION) at 08:23

## 2019-05-19 NOTE — PROGRESS NOTES
Patient is alert and oriented by 4  Is pleasant and cooperative with approach  Visual during meals  Social with staff and peers  Denies anxiety and depression  Partially compliant with medication having refused Lovenox this morning  Asked for PRN albuterol when expiratory wheezing was noted  Incentive spirometer performed after inhalers was well tolerated  Meals compliant  Care plan reinforced and encouragement given for ADLs  Will keep monitoring for safety and support

## 2019-05-19 NOTE — PROGRESS NOTES
Progress Note - Aspen Crawford [age] y o  female MRN: [id number]  Unit/Bed#: Madyson Watkins 038-57 Encounter: [id number]    The patient was seen for continuing care and reviewed with treatment team  Staff reports no significant change in the past 24 hrs  Patient was observed sitting in the dining room, visible and social with peers  She is pleasant and cooperative upon approach and presents with a bright affect and good eye contact noted  She currently rates her depression and anxiety as a 3/10 and attributes the increase to her racing thoughts at night  She is reporting restless sleep and decreased energy level today  She reports good appetite  She is compliant with medications and no side effects noted at this time  Patient requested Seroquel for sleep and was reminded she has Trazodone, PRN for sleep and just needs to ask the nursing staff  Continues on O2  Mental Status Evaluation:  Appearance:  Adequate hygiene and grooming   Behavior:  cooperative and friendly   Mood:  anxious   Affect: appropriate and mood-congruent   Speech: Normal rate and Normal volume   Thought Process:  Goal directed and coherent   Thought Content:  Does not verbalize delusional material   Perceptual Disturbances: Denies hallucinations and does not appear to be responding to internal stimuli   Risk Potential: No suicidal or homicidal ideation   Attention/Concentration attention span and concentration were age appropriate   Orientation:   Alert and awake   Gait/Station: Not observed   Motor Activity: No abnormal movement noted     Progress Toward Goals: no change    Assessment/Plan    Principal Problem:    Schizoaffective disorder, bipolar type (HCC)  Active Problems:    Pneumonia of right upper lobe due to infectious organism ([facility] [address] )    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[facility] [address] )    Acquired hypothyroidism    Gastroesophageal reflux disease without esophagitis      Recommended Treatment:     1   Continue with pharmacotherapy, group therapy, milieu therapy and occupational therapy  2  Continue with safety protocol per unit  3  No medication changes at this time  The patient will be maintained on the following medications:    Current Facility-Administered Medications:  acetaminophen 650 mg Oral Q6H PRN Erik Schumacher MD   albuterol 2 puff Inhalation Q4H PRN Blake Castanon MD   aluminum-magnesium hydroxide-simethicone 15 mL Oral Q4H PRN Dia Ricks, MD   benzonatate 100 mg Oral TID PRN Erik Schumacher, MD   benztropine 1 mg Intramuscular Q8H PRN Marchlillian Ricks, MD   benztropine 1 mg Oral Q8H PRN Marchlillian Ricks, MD   enoxaparin 40 mg Subcutaneous Daily Erik Schumacher MD   fluticasone-vilanterol 1 puff Inhalation Daily Itzel Kuhn MD   haloperidol 1 mg Oral Q6H PRN [facility], MD   haloperidol lactate 2 mg Intramuscular Q6H PRN [facility], MD   hydrOXYzine HCL 25 mg Oral Q6H PRN Marchlillian Ricks, MD   levothyroxine 125 mcg Oral Early Morning Erik Schumacher MD   magnesium hydroxide 30 mL Oral Daily PRN Marchlillian Ricks, MD   paliperidone 6 mg Oral Daily Deb Jansen MD   pantoprazole 40 mg Oral Early Morning Erik Schumacher, MD   risperiDONE 1 mg Oral Q8H PRN Marchlillian Ricks MD   theophylline 200 mg Oral Daily Erik Schumacher MD   traZODone 25 mg Oral HS PRN Erik Schumacher MD       Risks, benefits and possible side effects of Medications:   Risks, benefits, and possible side effects of medications explained to patient and patient verbalizes understanding         ABILIO Goncalves

## 2019-05-19 NOTE — NURSING NOTE
Patient visible at snacks time after encouragement to be OOB  She denies SOB, no wheezing noticed, denies pain  Incentive spirometer done  Patient able to reach 1000 without any complaints  Patient has nasal cannula 3L continuously  She is now sitting quietly at the day room   Will continue to monitor per protocol

## 2019-05-19 NOTE — PLAN OF CARE
Problem: Alteration in Thoughts and Perception  Goal: Treatment Goal: Gain control of psychotic behaviors/thinking, reduce/eliminate presenting symptoms and demonstrate improved reality functioning upon discharge  Outcome: Progressing  Goal: Verbalize thoughts and feelings  Description  Interventions:  - Promote a nonjudgmental and trusting relationship with the patient through active listening and therapeutic communication  - Assess patient's level of functioning, behavior and potential for risk  - Engage patient in 1 on 1 interactions for a minimum of 15 minutes each session  - Encourage patient to express fears, feelings, frustrations, and discuss symptoms    - [place] patient to reality, help patient recognize reality-based thinking   - Administer medications as ordered and assess for potential side effects  - Provide the patient education related to the signs and symptoms of the illness and desired effects of prescribed medications  Outcome: Progressing  Goal: Refrain from acting on delusional thinking/internal stimuli  Description  Interventions:  - Monitor patient closely, per order   - Utilize least restrictive measures   - Set reasonable limits, give positive feedback for acceptable   - Administer medications as ordered and monitor of potential side effects  Outcome: Progressing  Goal: Agree to be compliant with medication regime, as prescribed and report medication side effects  Description  Interventions:  - Offer appropriate PRN medication and supervise ingestion; conduct aims, as needed   Outcome: Progressing  Goal: Attend and participate in unit activities, including therapeutic, recreational, and educational groups  Description  Interventions:  - Provide therapeutic and educational activities daily, encourage attendance and participation, and document same in the medical record   Outcome: Progressing  Goal: Recognize dysfunctional thoughts, communicate reality-based thoughts at the time of discharge  Description  Interventions:  - Provide medication and psycho-education to assist patient in compliance and developing insight into his/her illness   Outcome: Progressing  Goal: Complete daily ADLs, including personal hygiene independently, as able  Description  Interventions:  - Observe, teach, and assist patient with ADLS  - Monitor and promote a balance of rest/activity, with adequate nutrition and elimination   Outcome: Progressing     Problem: Ineffective Coping  Goal: Cooperates with admission process  Description  Interventions:   - Complete admission process  Outcome: Progressing  Goal: Identifies ineffective coping skills  Outcome: Progressing  Goal: Identifies healthy coping skills  Outcome: Progressing  Goal: Demonstrates healthy coping skills  Outcome: Progressing  Goal: Participates in unit activities  Description  Interventions:  - Provide therapeutic environment   - Provide required programming   - Redirect inappropriate behaviors   Outcome: Progressing  Goal: Patient/Family participate in treatment and DC plans  Description  Interventions:  - Provide therapeutic environment  Outcome: Progressing  Goal: Patient/Family verbalizes awareness of resources  Outcome: Progressing  Goal: Understands least restrictive measures  Description  Interventions:  - Utilize least restrictive behavior  Outcome: Progressing  Goal: Free from restraint events  Description  - Utilize least restrictive measures   - Provide behavioral interventions   - Redirect inappropriate behaviors   Outcome: Progressing     Problem: Risk for Self Injury/Neglect  Goal: Treatment Goal: Remain safe during length of stay, learn and adopt new coping skills, and be free of self-injurious ideation, impulses and acts at the time of discharge  Outcome: Progressing  Goal: Verbalize thoughts and feelings  Description  Interventions:  - Assess and re-assess patient's lethality and potential for self-injury  - Engage patient in 1:1 interactions, daily, for a minimum of 15 minutes  - Encourage patient to express feelings, fears, frustrations, hopes  - Establish rapport/trust with patient   Outcome: Progressing  Goal: Refrain from harming self  Description  Interventions:  - Monitor patient closely, per order  - Develop a trusting relationship  - Supervise medication ingestion, monitor effects and side effects   Outcome: Progressing  Goal: Attend and participate in unit activities, including therapeutic, recreational, and educational groups  Description  Interventions:  - Provide therapeutic and educational activities daily, encourage attendance and participation, and document same in the medical record  - Obtain collateral information, encourage visitation and family involvement in care   Outcome: Progressing  Goal: Recognize maladaptive responses and adopt new coping mechanisms  Outcome: Progressing  Goal: Complete daily ADLs, including personal hygiene independently, as able  Description  Interventions:  - Observe, teach, and assist patient with ADLS  - Monitor and promote a balance of rest/activity, with adequate nutrition and elimination  Outcome: Progressing     Problem: Depression  Goal: Treatment Goal: Demonstrate behavioral control of depressive symptoms, verbalize feelings of improved mood/affect, and adopt new coping skills prior to discharge  Outcome: Progressing  Goal: Verbalize thoughts and feelings  Description  Interventions:  - Assess and re-assess patient's level of risk   - Engage patient in 1:1 interactions, daily, for a minimum of 15 minutes   - Encourage patient to express feelings, fears, frustrations, hopes   Outcome: Progressing  Goal: Refrain from harming self  Description  Interventions:  - Monitor patient closely, per order   - Supervise medication ingestion, monitor effects and side effects   Outcome: Progressing  Goal: Refrain from isolation  Description  Interventions:  - Develop a trusting relationship   - Encourage socialization   Outcome: Progressing  Goal: Refrain from self-neglect  Outcome: Progressing  Goal: Attend and participate in unit activities, including therapeutic, recreational, and educational groups  Description  Interventions:  - Provide therapeutic and educational activities daily, encourage attendance and participation, and document same in the medical record   Outcome: Progressing  Goal: Complete daily ADLs, including personal hygiene independently, as able  Description  Interventions:  - Observe, teach, and assist patient with ADLS  -  Monitor and promote a balance of rest/activity, with adequate nutrition and elimination   Outcome: Progressing     Problem: Anxiety  Goal: Anxiety is at manageable level  Description  Interventions:  - Assess and monitor patient's anxiety level  - Monitor for signs and symptoms of anxiety both physical and emotional (heart palpitations, chest pain, shortness of breath, headaches, nausea, feeling jumpy, restlessness, irritable, apprehensive)  - Collaborate with interdisciplinary team and initiate plan and interventions as ordered    - [place] patient to unit/surroundings  - Explain treatment plan  - Encourage participation in care  - Encourage verbalization of concerns/fears  - Identify coping mechanisms  - Assist in developing anxiety-reducing skills  - Administer/offer alternative therapies  - Limit or eliminate stimulants  Outcome: Progressing     Problem: DISCHARGE PLANNING - CARE MANAGEMENT  Goal: Discharge to post-acute care or home with appropriate resources  Description  INTERVENTIONS:  - Conduct assessment to determine patient/family and health care team treatment goals, and need for post-acute services based on payer coverage, community resources, and patient preferences, and barriers to discharge  - Address psychosocial, clinical, and financial barriers to discharge as identified in assessment in conjunction with the patient/family and health care team  - Arrange appropriate level of post-acute services according to patients   needs and preference and payer coverage in collaboration with the physician and health care team  - Communicate with and update the patient/family, physician, and health care team regarding progress on the discharge plan  - Arrange appropriate transportation to post-acute venues  Outcome: Progressing     Problem: Prexisting or High Potential for Compromised Skin Integrity  Goal: Skin integrity is maintained or improved  Description  INTERVENTIONS:  - Identify patients at risk for skin breakdown  - Assess and monitor skin integrity  - Assess and monitor nutrition and hydration status  - Monitor labs (i e  albumin)  - Assess for incontinence   - Turn and reposition patient  - Assist with mobility/ambulation  - Relieve pressure over bony prominences  - Avoid friction and shearing  - Provide appropriate hygiene as needed including keeping skin clean and dry  - Evaluate need for skin moisturizer/barrier cream  - Collaborate with interdisciplinary team (i e  Nutrition, Rehabilitation, etc )   - Patient/family teaching  Outcome: Progressing

## 2019-05-19 NOTE — PROGRESS NOTES
Patient has been compliant with incentive spirometry  Procedure well tolerated  Denies pain or any further discomfort this time  Meals and medication compliant  Will keep monitoring for safety and support

## 2019-05-19 NOTE — NURSING NOTE
Patient requesting inhaler  O2 98 % nasal cannula 3 Liters  Lungs clear, diminished at bases due to poor effort  No signs of distress  PRN inhaler given at 22:48  Patient is sitting quietly at the day room   Will continue to monitor per protocol

## 2019-05-19 NOTE — NURSING NOTE
Patient pleasant on approach  Patient requesting PRN albuterol  Pulsox 98% nasal cannula 3 Liters in place  Lungs diminished at bases due to poor effort, no wheezes noticed  Patients breathing is even, unlabored  Patient is resting quietly in bed  Head of bed above 30 graus  No further complaints   Will continue to monitor per protocol

## 2019-05-20 PROCEDURE — 99232 SBSQ HOSP IP/OBS MODERATE 35: CPT | Performed by: PSYCHIATRY & NEUROLOGY

## 2019-05-20 RX ADMIN — PALIPERIDONE 6 MG: 6 TABLET, FILM COATED, EXTENDED RELEASE ORAL at 08:42

## 2019-05-20 RX ADMIN — PANTOPRAZOLE SODIUM 40 MG: 40 TABLET, DELAYED RELEASE ORAL at 06:04

## 2019-05-20 RX ADMIN — THEOPHYLLINE ANHYDROUS 200 MG: 200 CAPSULE, EXTENDED RELEASE ORAL at 08:42

## 2019-05-20 RX ADMIN — FLUTICASONE FUROATE AND VILANTEROL TRIFENATATE 1 PUFF: 200; 25 POWDER RESPIRATORY (INHALATION) at 08:49

## 2019-05-20 RX ADMIN — ALBUTEROL SULFATE 2 PUFF: 90 AEROSOL, METERED RESPIRATORY (INHALATION) at 17:55

## 2019-05-20 RX ADMIN — ALBUTEROL SULFATE 2 PUFF: 90 AEROSOL, METERED RESPIRATORY (INHALATION) at 23:58

## 2019-05-20 RX ADMIN — ALBUTEROL SULFATE 2 PUFF: 90 AEROSOL, METERED RESPIRATORY (INHALATION) at 08:42

## 2019-05-20 RX ADMIN — LEVOTHYROXINE SODIUM 125 MCG: 125 TABLET ORAL at 06:05

## 2019-05-20 RX ADMIN — ALBUTEROL SULFATE 2 PUFF: 90 AEROSOL, METERED RESPIRATORY (INHALATION) at 13:07

## 2019-05-20 NOTE — PROGRESS NOTES
[facility]  Psychiatrists  Progress Note    Patient Name: Joya Thorne  MRN: [id number]  DOS: 05/16/2019    Chief Complaint:  paranoid delusions    Interval History: Per staff, patient continues to be resistant and argumentative, requiring significant encouragement  Patient remains paranoid about the residence she came from, claiming she will die if she returns there  She continues to deny any need for medications  She argues she needs to be on 6mg of paliperidone longer to establish its safety when writer suggested increasing the dose  She attempts to dominate the conversation frequently, tries to dictate treatment, devalues treatment team recommendations  When writer redirects, she claims this writer of putting words in her mouth, that writer is accusing her of lying  She is evasive when writer suggests an alternative to paliperidone  Medication compliance is limited    Adverse effects of medications: reports baseless adverse effects    Mental Status Exam [Per above +]  Appearance: adequate grooming/ fair hygiene; no abnormal involuntary movements noted  Behavior: superficially attentive; dominates conversation; defensive  Speech: normal rate but pressured; difficult to redirect  Mood: worried  Affect: neutral, restricted, stable  Thought process: illogical  Thought content: paranoid delusions elicited; denies SI/HI  Perceptual disturbances: denies AH/VH  Cognition: oriented to all domains     Insight: poor  Judgement: poor      Current Facility-Administered Medications:     acetaminophen (TYLENOL) tablet 650 mg, 650 mg, Oral, Q6H PRN, Reji Duque MD    albuterol (PROVENTIL HFA,VENTOLIN HFA) inhaler 2 puff, 2 puff, Inhalation, Q4H PRN, Redd Henson MD, 2 puff at 05/20/19 1307    aluminum-magnesium hydroxide-simethicone (MYLANTA) 200-200-20 mg/5 mL oral suspension 15 mL, 15 mL, Oral, Q4H PRN, Jarrett Espinoza MD, 15 mL at 05/12/19 2221    benzonatate (TESSALON PERLES) capsule 100 mg, 100 mg, Oral, TID PRN, Nate Crowley MD    benztropine (COGENTIN) injection 1 mg, 1 mg, Intramuscular, Q8H PRN, Lynn Martinez MD    benztropine (COGENTIN) tablet 1 mg, 1 mg, Oral, Q8H PRN, Lynn Martinez MD    enoxaparin (LOVENOX) subcutaneous injection 40 mg, 40 mg, Subcutaneous, Daily, Nate Crowley MD    fluticasone-vilanterol (BREO ELLIPTA) 200-25 MCG/INH inhaler 1 puff, 1 puff, Inhalation, Daily, Osvaldo Jefferson MD, 1 puff at 05/20/19 0849    haloperidol (HALDOL) oral concentrated solution 1 mg, 1 mg, Oral, Q6H PRN, Lynn Martinez MD    haloperidol lactate (HALDOL) injection 2 mg, 2 mg, Intramuscular, Q6H PRN, Lynn Martinez MD    hydrOXYzine HCL (ATARAX) tablet 25 mg, 25 mg, Oral, Q6H PRN, Lynn Martinez MD    levothyroxine tablet 125 mcg, 125 mcg, Oral, Early Morning, Nate Crowley MD, 125 mcg at 05/20/19 0605    magnesium hydroxide (MILK OF MAGNESIA) 400 mg/5 mL oral suspension 30 mL, 30 mL, Oral, Daily PRN, Lynn Martinez MD    paliperidone (INVEGA) 24 hr tablet 6 mg, 6 mg, Oral, Daily, Tari Ge MD, 6 mg at 05/20/19 2326    pantoprazole (PROTONIX) EC tablet 40 mg, 40 mg, Oral, Early Morning, Nate Crowley MD, 40 mg at 05/20/19 0604    risperiDONE (RisperDAL M-TABS) dispersible tablet 1 mg, 1 mg, Oral, Q8H PRN, Lynn Martinez MD    theophylline (JEF-24) 24 hr capsule 200 mg, 200 mg, Oral, Daily, Nate Crowley MD, 200 mg at 05/20/19 7124    traZODone (DESYREL) tablet 25 mg, 25 mg, Oral, HS PRN, Nate Crowley MD    Vitals:    05/20/19 0653   BP: 113/67   Pulse: 73   Resp: 17   Temp: 98 4 °F (36 9 °C)   SpO2: 97%       Lab/work up: no new labs    Assessment:     Axis I:  · Schizoaffective disorder; has been treatment resistant  Axis II:  · OCPD  · Cluster B traits  Axis III:  - recovering from sepsis 2/2 CAP, COPD with asthma, stable compression fracture of L4   Axis IV:  · Limited social support, chronically homeless, financial problems, on disability, noncompliant with treatment    Progress: limited    Plan:   1  Disposition: Patient meets criteria for ongoing acute inpatient treatment due to being danger to self 2/2 psychosis and poor self care  Patient will be discharged when there is an absence of psychosis [id number]  2  Legal status: 303  3  Psychopharmacologic interventions:  - patient refusing paliperidone titration - essentially refusing alternatives as she avoids this discussion  - Continue to monitor psychosis  4  Medical comorbidities: Hospitalist following PRN  5  Other therapies: Individual/group/milieu therapy as appropriate   6  Social issues: Case management following to arrange disposition - will pursue state hospitalization instead as patient is increasingly more psychotic  7   Precautions: Routine q15min checks, vitals qshift    Max Marroquin MD  05/16/2019

## 2019-05-20 NOTE — PROGRESS NOTES
Awake and visible during breakfast,then requested to go back to her room   Declined groups despite encouragement  Accepted medication,exept Lovenox after education provided  Pt denies any AH or VH,denies depression  No distress noted  Will continue to monitor closely

## 2019-05-20 NOTE — NURSING NOTE
PRN albuterol requested  O2 99% No respiratory distress noticed  Respiration even, unalbored  PRN given at 23:56  No further complaints   Will continue to monitor per protocol

## 2019-05-21 PROCEDURE — 99231 SBSQ HOSP IP/OBS SF/LOW 25: CPT | Performed by: NURSE PRACTITIONER

## 2019-05-21 RX ORDER — AMMONIUM LACTATE 12 G/100G
LOTION TOPICAL 2 TIMES DAILY PRN
Status: DISCONTINUED | OUTPATIENT
Start: 2019-05-21 | End: 2019-07-23 | Stop reason: HOSPADM

## 2019-05-21 RX ORDER — POLYETHYLENE GLYCOL 3350 17 G/17G
17 POWDER, FOR SOLUTION ORAL DAILY PRN
Status: DISCONTINUED | OUTPATIENT
Start: 2019-05-21 | End: 2019-07-23 | Stop reason: HOSPADM

## 2019-05-21 RX ADMIN — LEVOTHYROXINE SODIUM 125 MCG: 125 TABLET ORAL at 05:45

## 2019-05-21 RX ADMIN — Medication: at 23:54

## 2019-05-21 RX ADMIN — ALBUTEROL SULFATE 2 PUFF: 90 AEROSOL, METERED RESPIRATORY (INHALATION) at 08:33

## 2019-05-21 RX ADMIN — PALIPERIDONE 6 MG: 6 TABLET, FILM COATED, EXTENDED RELEASE ORAL at 08:32

## 2019-05-21 RX ADMIN — ALBUTEROL SULFATE 2 PUFF: 90 AEROSOL, METERED RESPIRATORY (INHALATION) at 12:48

## 2019-05-21 RX ADMIN — PANTOPRAZOLE SODIUM 40 MG: 40 TABLET, DELAYED RELEASE ORAL at 05:45

## 2019-05-21 RX ADMIN — ALBUTEROL SULFATE 2 PUFF: 90 AEROSOL, METERED RESPIRATORY (INHALATION) at 18:09

## 2019-05-21 RX ADMIN — THEOPHYLLINE ANHYDROUS 200 MG: 200 CAPSULE, EXTENDED RELEASE ORAL at 08:32

## 2019-05-21 RX ADMIN — ALBUTEROL SULFATE 2 PUFF: 90 AEROSOL, METERED RESPIRATORY (INHALATION) at 23:53

## 2019-05-21 RX ADMIN — FLUTICASONE FUROATE AND VILANTEROL TRIFENATATE 1 PUFF: 200; 25 POWDER RESPIRATORY (INHALATION) at 08:32

## 2019-05-21 NOTE — CASE MANAGEMENT
Writer spoke with [facility], plan to submit state referral for [facility] by Thursday to Elizabeth MILAN with [facility], awaiting [facility] letter of recommendation  CM will continue to follow and provide services as needed

## 2019-05-21 NOTE — PROGRESS NOTES
Patient remains on continuous O2 at 3L/minute  Patient is more agreeable and less demanding tonight  Brightens on approach  She sits in the day room for meals but does not remain long, choosing to go to her room and be alone   Rescue inhaler x1 per patient request

## 2019-05-21 NOTE — PROGRESS NOTES
Patient is alert and oriented per her baseline  Prn Ventolin Inhaler treatment administered, and was effective  Patient is currently resting in bed with eyes closed  Will continue to monitor patient

## 2019-05-21 NOTE — PROGRESS NOTES
Patient is more pleasant than last week  Somewhat less demanding  Goes back to her room after meals and snacks but social with select peers when visible in the mileu  O2 continuous at 3L/min maintained   PRN inhaler per Rx "I can have it every 4 hours" She denies SI

## 2019-05-21 NOTE — PLAN OF CARE
Problem: Alteration in Thoughts and Perception  Goal: Treatment Goal: Gain control of psychotic behaviors/thinking, reduce/eliminate presenting symptoms and demonstrate improved reality functioning upon discharge  Outcome: Progressing  Goal: Verbalize thoughts and feelings  Description  Interventions:  - Promote a nonjudgmental and trusting relationship with the patient through active listening and therapeutic communication  - Assess patient's level of functioning, behavior and potential for risk  - Engage patient in 1 on 1 interactions for a minimum of 15 minutes each session  - Encourage patient to express fears, feelings, frustrations, and discuss symptoms    - [place] patient to reality, help patient recognize reality-based thinking   - Administer medications as ordered and assess for potential side effects  - Provide the patient education related to the signs and symptoms of the illness and desired effects of prescribed medications  Outcome: Progressing  Goal: Refrain from acting on delusional thinking/internal stimuli  Description  Interventions:  - Monitor patient closely, per order   - Utilize least restrictive measures   - Set reasonable limits, give positive feedback for acceptable   - Administer medications as ordered and monitor of potential side effects  Outcome: Progressing  Goal: Agree to be compliant with medication regime, as prescribed and report medication side effects  Description  Interventions:  - Offer appropriate PRN medication and supervise ingestion; conduct aims, as needed   Outcome: Progressing  Goal: Attend and participate in unit activities, including therapeutic, recreational, and educational groups  Description  Interventions:  - Provide therapeutic and educational activities daily, encourage attendance and participation, and document same in the medical record   Outcome: Progressing  Goal: Recognize dysfunctional thoughts, communicate reality-based thoughts at the time of discharge  Description  Interventions:  - Provide medication and psycho-education to assist patient in compliance and developing insight into his/her illness   Outcome: Not Progressing  Goal: Complete daily ADLs, including personal hygiene independently, as able  Description  Interventions:  - Observe, teach, and assist patient with ADLS  - Monitor and promote a balance of rest/activity, with adequate nutrition and elimination   Outcome: Not Progressing     Problem: Ineffective Coping  Goal: Cooperates with admission process  Description  Interventions:   - Complete admission process  Outcome: Progressing  Goal: Identifies ineffective coping skills  Outcome: Not Progressing  Goal: Identifies healthy coping skills  Outcome: Not Progressing  Goal: Demonstrates healthy coping skills  Outcome: Not Progressing  Goal: Participates in unit activities  Description  Interventions:  - Provide therapeutic environment   - Provide required programming   - Redirect inappropriate behaviors   Outcome: Progressing  Goal: Patient/Family participate in treatment and DC plans  Description  Interventions:  - Provide therapeutic environment  Outcome: Not Progressing  Goal: Patient/Family verbalizes awareness of resources  Outcome: Progressing  Goal: Understands least restrictive measures  Description  Interventions:  - Utilize least restrictive behavior  Outcome: Progressing  Goal: Free from restraint events  Description  - Utilize least restrictive measures   - Provide behavioral interventions   - Redirect inappropriate behaviors   Outcome: Progressing     Problem: Risk for Self Injury/Neglect  Goal: Treatment Goal: Remain safe during length of stay, learn and adopt new coping skills, and be free of self-injurious ideation, impulses and acts at the time of discharge  Outcome: Progressing  Goal: Verbalize thoughts and feelings  Description  Interventions:  - Assess and re-assess patient's lethality and potential for self-injury  - Engage patient in 1:1 interactions, daily, for a minimum of 15 minutes  - Encourage patient to express feelings, fears, frustrations, hopes  - Establish rapport/trust with patient   Outcome: Progressing  Goal: Refrain from harming self  Description  Interventions:  - Monitor patient closely, per order  - Develop a trusting relationship  - Supervise medication ingestion, monitor effects and side effects   Outcome: Progressing  Goal: Attend and participate in unit activities, including therapeutic, recreational, and educational groups  Description  Interventions:  - Provide therapeutic and educational activities daily, encourage attendance and participation, and document same in the medical record  - Obtain collateral information, encourage visitation and family involvement in care   Outcome: Progressing  Goal: Recognize maladaptive responses and adopt new coping mechanisms  Outcome: Progressing  Goal: Complete daily ADLs, including personal hygiene independently, as able  Description  Interventions:  - Observe, teach, and assist patient with ADLS  - Monitor and promote a balance of rest/activity, with adequate nutrition and elimination  Outcome: Not Progressing     Problem: Depression  Goal: Treatment Goal: Demonstrate behavioral control of depressive symptoms, verbalize feelings of improved mood/affect, and adopt new coping skills prior to discharge  Outcome: Not Progressing  Goal: Verbalize thoughts and feelings  Description  Interventions:  - Assess and re-assess patient's level of risk   - Engage patient in 1:1 interactions, daily, for a minimum of 15 minutes   - Encourage patient to express feelings, fears, frustrations, hopes   Outcome: Progressing  Goal: Refrain from harming self  Description  Interventions:  - Monitor patient closely, per order   - Supervise medication ingestion, monitor effects and side effects   Outcome: Progressing  Goal: Refrain from isolation  Description  Interventions:  - Develop a trusting relationship   - Encourage socialization   Outcome: Progressing  Goal: Refrain from self-neglect  Outcome: Progressing  Goal: Attend and participate in unit activities, including therapeutic, recreational, and educational groups  Description  Interventions:  - Provide therapeutic and educational activities daily, encourage attendance and participation, and document same in the medical record   Outcome: Progressing  Goal: Complete daily ADLs, including personal hygiene independently, as able  Description  Interventions:  - Observe, teach, and assist patient with ADLS  -  Monitor and promote a balance of rest/activity, with adequate nutrition and elimination   Outcome: Progressing     Problem: Anxiety  Goal: Anxiety is at manageable level  Description  Interventions:  - Assess and monitor patient's anxiety level  - Monitor for signs and symptoms of anxiety both physical and emotional (heart palpitations, chest pain, shortness of breath, headaches, nausea, feeling jumpy, restlessness, irritable, apprehensive)  - Collaborate with interdisciplinary team and initiate plan and interventions as ordered    - [place] patient to unit/surroundings  - Explain treatment plan  - Encourage participation in care  - Encourage verbalization of concerns/fears  - Identify coping mechanisms  - Assist in developing anxiety-reducing skills  - Administer/offer alternative therapies  - Limit or eliminate stimulants  Outcome: Progressing     Problem: DISCHARGE PLANNING - CARE MANAGEMENT  Goal: Discharge to post-acute care or home with appropriate resources  Description  INTERVENTIONS:  - Conduct assessment to determine patient/family and health care team treatment goals, and need for post-acute services based on payer coverage, community resources, and patient preferences, and barriers to discharge  - Address psychosocial, clinical, and financial barriers to discharge as identified in assessment in conjunction with the patient/family and health care team  - Arrange appropriate level of post-acute services according to patients   needs and preference and payer coverage in collaboration with the physician and health care team  - Communicate with and update the patient/family, physician, and health care team regarding progress on the discharge plan  - Arrange appropriate transportation to post-acute venues  Outcome: Progressing     Problem: Prexisting or High Potential for Compromised Skin Integrity  Goal: Skin integrity is maintained or improved  Description  INTERVENTIONS:  - Identify patients at risk for skin breakdown  - Assess and monitor skin integrity  - Assess and monitor nutrition and hydration status  - Monitor labs (i e  albumin)  - Assess for incontinence   - Turn and reposition patient  - Assist with mobility/ambulation  - Relieve pressure over bony prominences  - Avoid friction and shearing  - Provide appropriate hygiene as needed including keeping skin clean and dry  - Evaluate need for skin moisturizer/barrier cream  - Collaborate with interdisciplinary team (i e  Nutrition, Rehabilitation, etc )   - Patient/family teaching  Outcome: Progressing

## 2019-05-21 NOTE — PROGRESS NOTES
Alert,awake and visible during breakfast,then isolates to her room  Pt denies any depression,denies AH or VH  Pt accepted her medication,exept Lovenox despite education provided  Poor insight into her mental illness, stated that she is waiting to talked to dr Rashaun Lao to pt that he is not her psychiatrist Will continue to monitor closely

## 2019-05-21 NOTE — PROGRESS NOTES
Progress Note - Behavioral Health   Joya Thorne 66 y o  female MRN: [id number]  Unit/Bed#: Madyson Watkins 298-13 Encounter: [id number]    The patient was seen for continuing care and reviewed with treatment team  Staff reports that the patient remains visible on the unit during meals, cooperative, but isolates to her room most of the day  Attends select groups and remains compliant with medications    During assessment the patient is appropriate during conversation, but can be demanding, irritable, and labile in mood  Attempts to dominate conversation with specific requests  Continues to have poor insight into illness  Paranoia appears to be decreased, but obsessive ruminations and negative thinking remains  Denies any depression, anxiety, or psychotic symptoms  Denies any adverse medication side effects      Mental Status Evaluation:  Appearance:  disheveled   Behavior:  cooperative, evasive, guarded and Superficial   Fund of knowledge  Diminished   Speech:   Language: Normal rate and Normal volume  No overt abnormality   Mood:  improving   Affect:   Associations: blunted  Intact   Thought Process:  Circumstantial   Thought Content:  Paranoid and mistrustful and Obsessive ruminations   Perceptual Disturbances: Denies hallucinations and does not appear to be responding to internal stimuli   Risk Potential: No suicidal or homicidal ideation   Orientation  Oriented x 3   Memory Not tested   Attention/Concentration attention span appeared shorter than expected for age   Insight:  No insight   Judgment: Poor judgment   Gait/Station: normal gait/station and normal balance   Motor Activity: No abnormal movement noted     Progress Toward Goals: improving    Assessment/Plan    Principal Problem:    Schizoaffective disorder, bipolar type (HCC)  Active Problems:    Pneumonia of right upper lobe due to infectious organism ([facility] [address] )    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[facility] [address] )    Acquired hypothyroidism    Gastroesophageal reflux disease without esophagitis      Recommended Treatment: Continue with pharmacotherapy, group therapy, milieu therapy and occupational therapy  The patient will be maintained on the following medications:    Current Facility-Administered Medications:  acetaminophen 650 mg Oral Q6H PRN Niels Le MD   albuterol 2 puff Inhalation Q4H PRN Lois Giang MD   aluminum-magnesium hydroxide-simethicone 15 mL Oral Q4H PRN Sae Wilson MD   ammonium lactate  Topical BID PRN Myrle Dense, CRNP   benzonatate 100 mg Oral TID PRN Niels Le MD   benztropine 1 mg Intramuscular Q8H PRN Sae Wilson MD   benztropine 1 mg Oral Q8H PRN Sae Wilson MD   enoxaparin 40 mg Subcutaneous Daily Niels Le MD   fluticasone-vilanterol 1 puff Inhalation Daily Vani Zee MD   haloperidol 1 mg Oral Q6H PRN Sae Wilson MD   haloperidol lactate 2 mg Intramuscular Q6H PRN Sae Wilson MD   hydrOXYzine HCL 25 mg Oral Q6H PRN Sae Wilson MD   levothyroxine 125 mcg Oral Early Morning Niels Le MD   magnesium hydroxide 30 mL Oral Daily PRN Sae Wilson MD   paliperidone 6 mg Oral Daily Rosemarie Sutherland MD   pantoprazole 40 mg Oral Early Morning Niels Le MD   polyethylene glycol 17 g Oral Daily PRN Myrle Dense, CRNP   risperiDONE 1 mg Oral Q8H PRN Sae Wilson MD   theophylline 200 mg Oral Daily Niels Le MD   traZODone 25 mg Oral HS PRN Niels Le MD       Risks, benefits and possible side effects of Medications:   Risks, benefits, and possible side effects of medications explained to patient and patient verbalizes understanding

## 2019-05-22 PROCEDURE — 99231 SBSQ HOSP IP/OBS SF/LOW 25: CPT | Performed by: PSYCHIATRY & NEUROLOGY

## 2019-05-22 RX ADMIN — THEOPHYLLINE ANHYDROUS 200 MG: 200 CAPSULE, EXTENDED RELEASE ORAL at 08:25

## 2019-05-22 RX ADMIN — PALIPERIDONE 6 MG: 6 TABLET, FILM COATED, EXTENDED RELEASE ORAL at 08:25

## 2019-05-22 RX ADMIN — FLUTICASONE FUROATE AND VILANTEROL TRIFENATATE 1 PUFF: 200; 25 POWDER RESPIRATORY (INHALATION) at 08:26

## 2019-05-22 RX ADMIN — ALBUTEROL SULFATE 2 PUFF: 90 AEROSOL, METERED RESPIRATORY (INHALATION) at 08:30

## 2019-05-22 RX ADMIN — PANTOPRAZOLE SODIUM 40 MG: 40 TABLET, DELAYED RELEASE ORAL at 05:39

## 2019-05-22 RX ADMIN — Medication: at 11:23

## 2019-05-22 RX ADMIN — LEVOTHYROXINE SODIUM 125 MCG: 125 TABLET ORAL at 05:39

## 2019-05-22 NOTE — PROGRESS NOTES
Patient again request for albuterol to this writer  not noted any audible wheezing on assessment  Patient  threatened she will call the police   All calls should be monitored   Patient yelling at this time  No SOB or distress noted    Patient again refused TB test

## 2019-05-22 NOTE — PROGRESS NOTES
Pt discussed at treatment rounds today, family practice will be consulted for physical assessment required for [facility] placement

## 2019-05-22 NOTE — PROGRESS NOTES
Pt requesting rescue inhaler  Does not have any SOB, no audible wheezing heard when lungs where assessed  Pt is yelling "my rights are being taking away, call crisis, call the all the doctors, I'm being mistreated " pt does not appear to be in any respiratory distress  Will continue to monitor

## 2019-05-22 NOTE — PROGRESS NOTES
Patient  Refusing TB skin test   Will try again  Stating '' its done before I get side effects from that ''   Patient wants to discuss with  [facility] NP about TB test

## 2019-05-22 NOTE — PROGRESS NOTES
Patient visible in the am  Denies depression and anxiety   Denies AH/VH  Poor insight   Isolates to in her room  Med and meal compliant  Paged Family practice for full physical   Patient refusing Lovenox despite with education provided that potential blood clots  Will continue to monitor

## 2019-05-22 NOTE — PROGRESS NOTES
Requested and given albuterol inhaler at 2353, effective  On 02 at 3 L via nasal cannula  Lac-hydrin lotion applied to dry skin  Currently appears to be resting calmly in bed with eyes closed   Monitored on Q 7 minute safety checks

## 2019-05-23 PROCEDURE — 99232 SBSQ HOSP IP/OBS MODERATE 35: CPT | Performed by: PSYCHIATRY & NEUROLOGY

## 2019-05-23 PROCEDURE — 97150 GROUP THERAPEUTIC PROCEDURES: CPT

## 2019-05-23 PROCEDURE — 97530 THERAPEUTIC ACTIVITIES: CPT

## 2019-05-23 RX ADMIN — PALIPERIDONE 6 MG: 6 TABLET, FILM COATED, EXTENDED RELEASE ORAL at 08:30

## 2019-05-23 RX ADMIN — PANTOPRAZOLE SODIUM 40 MG: 40 TABLET, DELAYED RELEASE ORAL at 06:03

## 2019-05-23 RX ADMIN — FLUTICASONE FUROATE AND VILANTEROL TRIFENATATE 1 PUFF: 200; 25 POWDER RESPIRATORY (INHALATION) at 08:30

## 2019-05-23 RX ADMIN — THEOPHYLLINE ANHYDROUS 200 MG: 200 CAPSULE, EXTENDED RELEASE ORAL at 08:30

## 2019-05-23 RX ADMIN — LEVOTHYROXINE SODIUM 125 MCG: 125 TABLET ORAL at 06:03

## 2019-05-23 NOTE — PROGRESS NOTES
Patient was up intermittently during the night fixated on her breathing treatment  Compliant with her AM medication  Patient is presently in bed sleeping  Will continue to monitor

## 2019-05-23 NOTE — PLAN OF CARE
Problem: OCCUPATIONAL THERAPY ADULT  Goal: Performs self-care activities at highest level of function for planned discharge setting  See evaluation for individualized goals  Description  Treatment Interventions: ADL retraining, Endurance training, Continued evaluation, Activityengagement(coping skills instruction, life management instruction)          See flowsheet documentation for full assessment, interventions and recommendations  5/23/2019 1155 by Felisa Gaona OT  Outcome: Progressing  Note:   Limitation: Decreased ADL status, Decreased high-level ADLs, Mood limitation(guarded, limited coping and life management skills)  Prognosis: Fair  Assessment: Patsy was pleasant, alert, engaged for this morning OT Life Management session  She was talkative, initiating as the group discussed journal keeping and the benefits of journal keeping  She was provided a journal, handouts concerning different ways to journal  She explored the benefits of gratitude journals, self nuturing journals, etc  She was engaged and supportive of group process  Progress has been consistent towards her goals on this occasion  She was commended and encouraged to regularly join program, and she was reminded of her contributions when in OT group

## 2019-05-23 NOTE — PROGRESS NOTES
Patient was laying in bed quietly when the shift started  Patient requested for her PRN Albuterol and this writer did assess her lungs and they were clear B/L and patient was educated that she does not need the Albuterol  Patient then requested her oxygen saturattion be checked and that was done and it was 96%  Patient did call twice for this writer to verify if her oxygen is at 3L and it was at 3L  Patient requested this writer to page the respiratory therapist but this writer told her it was not necessary per her lungs assessment  Q 7 minutes safety checks ongoing   Will continue to monitor

## 2019-05-23 NOTE — PROGRESS NOTES
[facility]  Psychiatrists  Progress Note    Patient Name: Niyah Ortiz  MRN: [id number]  DOS: 05/23/19    Chief Complaint:  paranoid delusions    Interval History: Per staff, patient remains paranoid  She is illogical, hypervigilant  Refusing PPD  Denies AH/VH  Continues to try to dictate treatment  Medication compliance is fair with significant encouragement    Adverse effects of medications: denies    Mental Status Exam [Per above +]  Appearance: incompletely dressed; no abnormal involuntary movements  Behavior: superficially engaged  Speech: pressured, dominates conversation  Mood: anxious  Affect: constricted  Thought process: illogical, tangential  Thought content: paranoid delusions elicited; denies SI/HI  Perceptual disturbances: denies AH/VH  Cognition: oriented to all domains     Insight: poor  Judgement: poor        Current Facility-Administered Medications:     acetaminophen (TYLENOL) tablet 650 mg, 650 mg, Oral, Q6H PRN, Mandie Anderson MD    albuterol (PROVENTIL HFA,VENTOLIN HFA) inhaler 2 puff, 2 puff, Inhalation, Q4H PRN, Carolann Hendrix MD, 2 puff at 05/22/19 0830    aluminum-magnesium hydroxide-simethicone (MYLANTA) 200-200-20 mg/5 mL oral suspension 15 mL, 15 mL, Oral, Q4H PRN, Virginia Rivera MD, 15 mL at 05/12/19 2221    ammonium lactate (LAC-HYDRIN) 12 % lotion, , Topical, BID PRN, ABILIO Hayward    benzonatate (TESSALON PERLES) capsule 100 mg, 100 mg, Oral, TID PRN, Mandie Anderson MD    benztropine (COGENTIN) injection 1 mg, 1 mg, Intramuscular, Q8H PRN, Virginia Rivera MD    benztropine (COGENTIN) tablet 1 mg, 1 mg, Oral, Q8H PRN, Virginia Rivera MD    enoxaparin (LOVENOX) subcutaneous injection 40 mg, 40 mg, Subcutaneous, Daily, Mandie Anderson MD    fluticasone-vilanterol (BREO ELLIPTA) 200-25 MCG/INH inhaler 1 puff, 1 puff, Inhalation, Daily, Steven Long MD, 1 puff at 05/23/19 0830   haloperidol (HALDOL) oral concentrated solution 1 mg, 1 mg, Oral, Q6H PRN, Devan George MD    haloperidol lactate (HALDOL) injection 2 mg, 2 mg, Intramuscular, Q6H PRN, Devan George MD    hydrOXYzine HCL (ATARAX) tablet 25 mg, 25 mg, Oral, Q6H PRN, Devan George MD    levothyroxine tablet 125 mcg, 125 mcg, Oral, Early Morning, Patrick Dailey MD, 125 mcg at 05/23/19 0603    magnesium hydroxide (MILK OF MAGNESIA) 400 mg/5 mL oral suspension 30 mL, 30 mL, Oral, Daily PRN, Lucy Ramirez MD  [facility]  [START ON 5/24/2019] paliperidone (INVEGA) 24 hr tablet 9 mg, 9 mg, Oral, Daily, José Varghese MD    pantoprazole (PROTONIX) EC tablet 40 mg, 40 mg, Oral, Early Morning, Patrick Dailey MD, 40 mg at 05/23/19 0603    polyethylene glycol (MIRALAX) packet 17 g, 17 g, Oral, Daily PRN, ABILIO Mcdonald    risperiDONE (RisperDAL M-TABS) dispersible tablet 1 mg, 1 mg, Oral, Q8H PRN, Devan George MD    theophylline (JEF-24) 24 hr capsule 200 mg, 200 mg, Oral, Daily, Patrick Dailey MD, 200 mg at 05/23/19 0830    traZODone (DESYREL) tablet 25 mg, 25 mg, Oral, HS PRN, Patrick Dailey MD    tuberculin injection 5 Units, 5 Units, Intradermal, Once, José Varghese MD, Stopped at 05/22/19 1425    Vitals:    05/23/19 1518   BP: 104/51   Pulse: 89   Resp: 17   Temp: 97 8 °F (36 6 °C)   SpO2: 97%       Lab/work up: no new labs    Assessment:     Axis I:  · Schizoaffective disorder; has been treatment resistant  Axis II:  · OCPD  · Cluster B traits  Axis III:  - recovering from sepsis 2/2 CAP, COPD with asthma, stable compression fracture of L4   Axis IV:  · Limited social support, chronically homeless, financial problems, on disability, noncompliant with treatment    Progress: limited    Plan:   1  Disposition: Patient meets criteria for ongoing acute inpatient treatment due to being danger to self 2/2 psychosis and poor self care  Patient will be discharged when there is an absence of psychosis [id number]     2  Legal status: 304  3  Psychopharmacologic interventions:  - continue paliperidone to 9mg po daily  - Continue to monitor psychosis  4  Medical comorbidities: Hospitalist following PRN; refused PPD  5  Other therapies: Individual/group/milieu therapy as appropriate   6  Social issues: Case management following to arrange disposition - will pursue state hospitalization instead as patient is increasingly more psychotic  7   Precautions: Routine q15min checks, vitals qshift    Juan A Blas MD  05/23/19

## 2019-05-23 NOTE — PROGRESS NOTES
AFP discussed with patient about inhaler   Albuterol inhaler only for wheezing and can use only 3 x a week  If patient using more than that patient has to get different medicine per AFP  When [facility] requested for PPD  patient refused   States '' she is scared ''  Will continue to monitor

## 2019-05-23 NOTE — OCCUPATIONAL THERAPY NOTE
Occupational Therapy Activity Treatment Note      Kendellyn Listen    5/23/2019    Patient Active Problem List   Diagnosis    Sepsis ([facility]    Pneumonia of right upper lobe due to infectious organism ([address] )    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[address] )    Tobacco use disorder, continuous    Bipolar disorder ([address] )    Left hip pain    Lactic acidosis    Hypokalemia    Hypomagnesemia    Compression fracture of L4 lumbar vertebra    Thoracic compression fracture (HCC)    Ventral hernia    Parapneumonic effusion    Acute on chronic respiratory failure with hypoxia (HCC)    Chronic respiratory failure (HCC)    Hypophosphatemia    Elevated MCV    Compression deformity of vertebra    Schizoaffective disorder, bipolar type ([facility] )    Acquired hypothyroidism    Gastroesophageal reflux disease without esophagitis    Abnormal CT of the chest    Excessive cerumen in left ear canal    Lipoma of right upper extremity    Polydipsia    Localized swelling of both lower legs       Past Medical History:   Diagnosis Date    Acid reflux     Anxiety     Asthma     Bipolar 1 disorder (HCC)     Chronic pain disorder     Chronic respiratory failure (HCC)     Compression fracture of fourth lumbar vertebra ([address]     COPD (chronic obstructive pulmonary disease) (HCC)     Depression     GERD (gastroesophageal reflux disease)     History of home oxygen therapy     Hypothyroidism     Lipoma of upper extremity     Psychiatric illness     Schizoaffective disorder ([address] )     Substance abuse ([facility] )     Nicotine    Thoracic compression fracture ([facility] )     Ventral hernia        No past surgical history on file  05/23/19 2016   Assessment   Assessment Ernesto Marrero was sitting in the monitored dayroom on this day  She was approached for individual OT involvement and conversation  She was wearing O2, she was pleasant on approach   She talked about how she was not provided her inhaler earlier because she was told that she was not wheezing, but she did then agree that she was OK at this time  She did talk about a place she had been living in [place], that she really liked this facility but then decided to leave because "there were too many old people with too much drama"  I asked her if there is anything at this time that we can help her with, I asked her how she is doing for personal care  She stated that she had done her own bathing at the facility in [place], that now she does need more help  She did then state that she does her own dressing, but she does receive supervision for bathing, attributing this to always being on O2  She did share that she really enjoys crafts, coloring, word finds, etc  I did encourage her to join us in future crafts activities (and I attempted to set up time for this day for her to engage) and she did appear positive and interested in this  She was pleasant, she did engage in trivia and reminiscing  She was invested in discussion, her affect was bright, her comments were organized and relevant  Continue to encourage positive mood, positive energy investment in OT program assignment  Encourage Patsy to voice her concerns, explore ways for her to actively engage in her treatment while promoting personal health and wellness  Plan   Treatment Interventions ADL retraining; Endurance training;Continued evaluation; Activityengagement  (coping skills instruction, life management instruction)   Goal Expiration Date 06/06/19   Treatment Day 23   OT Frequency 5x/wk   Edgardo Guevara OT

## 2019-05-23 NOTE — CASE MANAGEMENT
Patient continues to be accusatory of staff trying to kill her by taking away life sustaining treatments, inhaler  Writer reassured patient that no one was trying to kill her and that no one here would intentionally harm her  Writer explained to patient that she would need the 2 step ppd even though she already had the 1 step on the other unit last admission  Patient stated, " I don't believe this  I will only take it if Sharon Turner tells me    She has long blonde hair and she is very nice to me, I like her very much! If you get her to tell me then I will take the shot " Writer reached out however, [facility] is out this week and will be back next  Writer will attempt to have patient agree to 2 step ppd  Writer still awaiting records from [facility] will continue to follow and provide services as needed

## 2019-05-23 NOTE — PROGRESS NOTES
Patient preoccupied with inhaler but patient more cooperative than yesterday  Lungs assessed with no audible wheezing noted   Patient refused Lovenox   Education provided risk of potential blood clots   Patient states '' I don't care I walk fine ''  Denies suicidal ideation   Denies AH/VH  meal compliant  Will continue to monitor

## 2019-05-23 NOTE — OCCUPATIONAL THERAPY NOTE
Occupational Therapy Group Treatment Note      [facility]    5/23/2019    Patient Active Problem List   Diagnosis    Sepsis ([address]    Pneumonia of right upper lobe due to infectious organism ([address] )    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[address] )    Tobacco use disorder, continuous    Bipolar disorder ([address] )    Left hip pain    Lactic acidosis    Hypokalemia    Hypomagnesemia    Compression fracture of L4 lumbar vertebra    Thoracic compression fracture (HCC)    Ventral hernia    Parapneumonic effusion    Acute on chronic respiratory failure with hypoxia (HCC)    Chronic respiratory failure (HCC)    Hypophosphatemia    Elevated MCV    Compression deformity of vertebra    Schizoaffective disorder, bipolar type ([address] )    Acquired hypothyroidism    Gastroesophageal reflux disease without esophagitis    Abnormal CT of the chest    Excessive cerumen in left ear canal    Lipoma of right upper extremity    Polydipsia    Localized swelling of both lower legs       Past Medical History:   Diagnosis Date    Acid reflux     Anxiety     Asthma     Bipolar 1 disorder (HCC)     Chronic pain disorder     Chronic respiratory failure (HCC)     Compression fracture of fourth lumbar vertebra ([address]     COPD (chronic obstructive pulmonary disease) (HCC)     Depression     GERD (gastroesophageal reflux disease)     History of home oxygen therapy     Hypothyroidism     Lipoma of upper extremity     Psychiatric illness     Schizoaffective disorder ([address]     Substance abuse ([address] )     Nicotine    Thoracic compression fracture ([address] )     Ventral hernia        No past surgical history on file  05/23/19 1105   Assessment   Assessment Jose Charles was present, alert, engaged throughout this second morning OT session, socialization, as she sat in the monitored dayroom  She did carry out some of the morning stretch exercises  She was talkative, pleasant   She attended to current event stories, I e , demolition of [employer], and she attended to visual modalities relevant to stories  She was pleasant, initiating throughout the session, she did introduce topics of interest  She provided numerous details in her comments and conversation  Her affect was friendly, positive  Her comments were relevant to discussion points  Progress has been consistent towards her goals  Continue to encourage Patsy to regularly join OT group program  Commend her efforts and contributions to group process  Plan   Treatment Interventions ADL retraining;Continued evaluation; Activityengagement  (coping skills instruction, life management instruction)   Goal Expiration Date 06/06/19   Treatment Day 23   OT Frequency 5x/wk   Jenifer Naranjo, OT

## 2019-05-23 NOTE — PLAN OF CARE
Problem: Alteration in Thoughts and Perception  Goal: Treatment Goal: Gain control of psychotic behaviors/thinking, reduce/eliminate presenting symptoms and demonstrate improved reality functioning upon discharge  Outcome: Progressing  Goal: Verbalize thoughts and feelings  Description  Interventions:  - Promote a nonjudgmental and trusting relationship with the patient through active listening and therapeutic communication  - Assess patient's level of functioning, behavior and potential for risk  - Engage patient in 1 on 1 interactions for a minimum of 15 minutes each session  - Encourage patient to express fears, feelings, frustrations, and discuss symptoms    - [place] patient to reality, help patient recognize reality-based thinking   - Administer medications as ordered and assess for potential side effects  - Provide the patient education related to the signs and symptoms of the illness and desired effects of prescribed medications  Outcome: Progressing  Goal: Refrain from acting on delusional thinking/internal stimuli  Description  Interventions:  - Monitor patient closely, per order   - Utilize least restrictive measures   - Set reasonable limits, give positive feedback for acceptable   - Administer medications as ordered and monitor of potential side effects  Outcome: Progressing  Goal: Agree to be compliant with medication regime, as prescribed and report medication side effects  Description  Interventions:  - Offer appropriate PRN medication and supervise ingestion; conduct aims, as needed   Outcome: Progressing  Goal: Attend and participate in unit activities, including therapeutic, recreational, and educational groups  Description  Interventions:  - Provide therapeutic and educational activities daily, encourage attendance and participation, and document same in the medical record   Outcome: Progressing  Goal: Recognize dysfunctional thoughts, communicate reality-based thoughts at the time of discharge  Description  Interventions:  - Provide medication and psycho-education to assist patient in compliance and developing insight into his/her illness   Outcome: Progressing  Goal: Complete daily ADLs, including personal hygiene independently, as able  Description  Interventions:  - Observe, teach, and assist patient with ADLS  - Monitor and promote a balance of rest/activity, with adequate nutrition and elimination   Outcome: Progressing     Problem: Ineffective Coping  Goal: Cooperates with admission process  Description  Interventions:   - Complete admission process  Outcome: Progressing  Goal: Identifies ineffective coping skills  Outcome: Progressing  Goal: Identifies healthy coping skills  Outcome: Progressing  Goal: Demonstrates healthy coping skills  Outcome: Progressing  Goal: Participates in unit activities  Description  Interventions:  - Provide therapeutic environment   - Provide required programming   - Redirect inappropriate behaviors   Outcome: Progressing  Goal: Patient/Family participate in treatment and DC plans  Description  Interventions:  - Provide therapeutic environment  Outcome: Progressing  Goal: Patient/Family verbalizes awareness of resources  Outcome: Progressing  Goal: Understands least restrictive measures  Description  Interventions:  - Utilize least restrictive behavior  Outcome: Progressing  Goal: Free from restraint events  Description  - Utilize least restrictive measures   - Provide behavioral interventions   - Redirect inappropriate behaviors   Outcome: Progressing     Problem: Risk for Self Injury/Neglect  Goal: Treatment Goal: Remain safe during length of stay, learn and adopt new coping skills, and be free of self-injurious ideation, impulses and acts at the time of discharge  Outcome: Progressing  Goal: Verbalize thoughts and feelings  Description  Interventions:  - Assess and re-assess patient's lethality and potential for self-injury  - Engage patient in 1:1 interactions, daily, for a minimum of 15 minutes  - Encourage patient to express feelings, fears, frustrations, hopes  - Establish rapport/trust with patient   Outcome: Progressing  Goal: Refrain from harming self  Description  Interventions:  - Monitor patient closely, per order  - Develop a trusting relationship  - Supervise medication ingestion, monitor effects and side effects   Outcome: Progressing  Goal: Attend and participate in unit activities, including therapeutic, recreational, and educational groups  Description  Interventions:  - Provide therapeutic and educational activities daily, encourage attendance and participation, and document same in the medical record  - Obtain collateral information, encourage visitation and family involvement in care   Outcome: Progressing  Goal: Recognize maladaptive responses and adopt new coping mechanisms  Outcome: Progressing  Goal: Complete daily ADLs, including personal hygiene independently, as able  Description  Interventions:  - Observe, teach, and assist patient with ADLS  - Monitor and promote a balance of rest/activity, with adequate nutrition and elimination  Outcome: Progressing     Problem: Depression  Goal: Treatment Goal: Demonstrate behavioral control of depressive symptoms, verbalize feelings of improved mood/affect, and adopt new coping skills prior to discharge  Outcome: Progressing  Goal: Verbalize thoughts and feelings  Description  Interventions:  - Assess and re-assess patient's level of risk   - Engage patient in 1:1 interactions, daily, for a minimum of 15 minutes   - Encourage patient to express feelings, fears, frustrations, hopes   Outcome: Progressing  Goal: Refrain from harming self  Description  Interventions:  - Monitor patient closely, per order   - Supervise medication ingestion, monitor effects and side effects   Outcome: Progressing  Goal: Refrain from isolation  Description  Interventions:  - Develop a trusting relationship   - Encourage socialization   Outcome: Progressing  Goal: Refrain from self-neglect  Outcome: Progressing  Goal: Attend and participate in unit activities, including therapeutic, recreational, and educational groups  Description  Interventions:  - Provide therapeutic and educational activities daily, encourage attendance and participation, and document same in the medical record   Outcome: Progressing  Goal: Complete daily ADLs, including personal hygiene independently, as able  Description  Interventions:  - Observe, teach, and assist patient with ADLS  -  Monitor and promote a balance of rest/activity, with adequate nutrition and elimination   Outcome: Progressing     Problem: Anxiety  Goal: Anxiety is at manageable level  Description  Interventions:  - Assess and monitor patient's anxiety level  - Monitor for signs and symptoms of anxiety both physical and emotional (heart palpitations, chest pain, shortness of breath, headaches, nausea, feeling jumpy, restlessness, irritable, apprehensive)  - Collaborate with interdisciplinary team and initiate plan and interventions as ordered    - Ord patient to unit/surroundings  - Explain treatment plan  - Encourage participation in care  - Encourage verbalization of concerns/fears  - Identify coping mechanisms  - Assist in developing anxiety-reducing skills  - Administer/offer alternative therapies  - Limit or eliminate stimulants  Outcome: Progressing     Problem: DISCHARGE PLANNING - CARE MANAGEMENT  Goal: Discharge to post-acute care or home with appropriate resources  Description  INTERVENTIONS:  - Conduct assessment to determine patient/family and health care team treatment goals, and need for post-acute services based on payer coverage, community resources, and patient preferences, and barriers to discharge  - Address psychosocial, clinical, and financial barriers to discharge as identified in assessment in conjunction with the patient/family and health care team  - Arrange appropriate level of post-acute services according to patients   needs and preference and payer coverage in collaboration with the physician and health care team  - Communicate with and update the patient/family, physician, and health care team regarding progress on the discharge plan  - Arrange appropriate transportation to post-acute venues  Outcome: Progressing     Problem: Prexisting or High Potential for Compromised Skin Integrity  Goal: Skin integrity is maintained or improved  Description  INTERVENTIONS:  - Identify patients at risk for skin breakdown  - Assess and monitor skin integrity  - Assess and monitor nutrition and hydration status  - Monitor labs (i e  albumin)  - Assess for incontinence   - Turn and reposition patient  - Assist with mobility/ambulation  - Relieve pressure over bony prominences  - Avoid friction and shearing  - Provide appropriate hygiene as needed including keeping skin clean and dry  - Evaluate need for skin moisturizer/barrier cream  - Collaborate with interdisciplinary team (i e  Nutrition, Rehabilitation, etc )   - Patient/family teaching  Outcome: Progressing

## 2019-05-23 NOTE — H&P
History and Physical - [place] [address]    Patient Information: Melquiades Dick 66 y o  female MRN: [id number]  Unit/Bed#: [facility] 184-57 Encounter: [id number]  Admitting Physician: Otilio Cox MD  PCP: Junior Staples MD  Date of Admission:  05/06/2019    Assessment and Plan    Gastroesophageal reflux disease without esophagitis  Assessment & Plan  - Stable   - Continue protonix 40 mg daily with mylanta PRN     Acquired hypothyroidism  Assessment & Plan  - Continue levothyroxine 125 mcg daily     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[facility] )  Assessment & Plan  - Longstanding history of COPD with asthma and is chronically on oxygen therapy at 3L  - Continue Breo Ellipta and Theophylline daily with albuterol inhalers as needed for wheezing  * Schizoaffective disorder, bipolar type ([facility] )  Assessment & Plan  - Management per psychiatry     VTE Prophylaxis: pharmacologic prophylaxis not indicated, ambulate patient  Code Status: Level 1 - Full Code  Anticipated Length of Stay:  Patient will be admitted on an Inpatient Psych basis with an anticipated length of stay of  > than 2 midnights  Justification for Hospital Stay: need for inpatient psychiatric care  Total Time for Visit, including Counseling / Coordination of Care: 60 mins  Greater than 50% of this total time spent on direct patient counseling and coordination of care  History of Present Illness:    Melquiades Dick is a 66 y o  female history of schizoaffective disorder, COPD with asthma on chronic home oxygen at 3L, hypothyroidism, GERD who is admitted on the inpatient psychiatric service for treatment resistant schizoaffective disorder  She offers no complaints  She denies any SOB, chest pain, or any other symptoms at this time  Review of Systems:  Review of Systems   Constitutional: Negative for fatigue and fever  Respiratory: Negative for cough and shortness of breath  Cardiovascular: Negative for chest pain and palpitations     Gastrointestinal: Negative for abdominal pain  Musculoskeletal: Negative for back pain  Skin: Negative for rash  Neurological: Negative for dizziness and weakness  Past Medical and Surgical History:   Past Medical History:   Diagnosis Date    Acid reflux     Anxiety     Asthma     Bipolar 1 disorder ([facility])     Chronic pain disorder     Chronic respiratory failure (HCC)     Compression fracture of fourth lumbar vertebra (HCC)     COPD (chronic obstructive pulmonary disease) ([facility])     Depression     GERD (gastroesophageal reflux disease)     History of home oxygen therapy     Hypothyroidism     Lipoma of upper extremity     Psychiatric illness     Schizoaffective disorder ([facility])     Substance abuse ([facility])     Nicotine    Thoracic compression fracture ([facility])     Ventral hernia      No past surgical history on file  Meds/Allergies: Allergies: Allergies   Allergen Reactions    Peanut-Containing Drug Products Anaphylaxis    Shellfish-Derived Products Anaphylaxis    Antihistamines, Chlorpheniramine-Type     Aspirin     Atrovent [Ipratropium]     Elavil [Amitriptyline]     Iodine      Other reaction(s): Unknown Reaction    Levaquin [Levofloxacin]     Novocain [Procaine]     Penicillins      Able to tolerate azithromycin and vancomycin    Prolixin [Fluphenazine]     Sulfa Antibiotics Other (See Comments)     Unknown Zithromax-Tight Throat     Prior to Admission Medications   Prescriptions Last Dose Informant Patient Reported? Taking?    EPINEPHrine (EPIPEN JR) 0 15 mg/0 3 mL SOAJ Unknown at Unknown time  No No   Sig: Inject 0 3 mL (0 15 mg total) into a muscle once as needed for anaphylaxis As needed for allergic reaction   OXYGEN-HELIUM IN 5/5/2019 at Unknown time  Yes Yes   Sig: Inhale 3 L    acetaminophen (TYLENOL) 325 mg tablet Unknown at Unknown time  No No   Sig: Take 2 tablets (650 mg total) by mouth every 6 (six) hours as needed for mild pain   albuterol (PROVENTIL HFA,VENTOLIN HFA) 90 mcg/act inhaler Unknown at Unknown time  No No   Sig: Inhale 2 puffs every 4 (four) hours as needed for wheezing   fluticasone-salmeterol (ADVAIR DISKUS, WIXELA INHUB) 250-50 mcg/dose inhaler Unknown at Unknown time  No No   Sig: Inhale 1 puff 2 (two) times a day Rinse mouth after use     levothyroxine 125 mcg tablet Unknown at Unknown time  No No   Sig: Take 1 tablet (125 mcg total) by mouth daily   pantoprazole (PROTONIX) 20 mg tablet Unknown at Unknown time  No No   Sig: Take 1 tablet (20 mg total) by mouth daily   theophylline (JEF-24) 200 MG 24 hr capsule Unknown at Unknown time  No No   Sig: Take 1 capsule (200 mg total) by mouth daily      Facility-Administered Medications: None     Social History:     Social History     Socioeconomic History    Marital status: Single     Spouse name: Not on file    Number of children: Not on file    Years of education: Not on file    Highest education level: Not on file   Occupational History    Not on file   Social Needs    Financial resource strain: Not on file    Food insecurity:     Worry: Not on file     Inability: Not on file    Transportation needs:     Medical: Not on file     Non-medical: Not on file   Tobacco Use    Smoking status: Current Every Day Smoker     Packs/day: 0 20     Types: Cigarettes    Smokeless tobacco: Never Used   Substance and Sexual Activity    Alcohol use: Never     Frequency: Never     Binge frequency: Never    Drug use: No    Sexual activity: Not Currently   Lifestyle    Physical activity:     Days per week: Not on file     Minutes per session: Not on file    Stress: Not on file   Relationships    Social connections:     Talks on phone: Not on file     Gets together: Not on file     Attends Yazidi service: Not on file     Active member of club or organization: Not on file     Attends meetings of clubs or organizations: Not on file     Relationship status: Not on file    Intimate partner violence:     Fear of current or ex partner: Not on file     Emotionally abused: Not on file     Physically abused: Not on file     Forced sexual activity: Not on file   Other Topics Concern    Not on file   Social History Narrative    Not on file     Family History:  Family History   Problem Relation Age of Onset    Arthritis Mother      Physical Exam:   Vitals:   Blood Pressure: 102/56 (05/23/19 0650)  Pulse: 73 (05/23/19 0650)  Temperature: 98 °F (36 7 °C) (05/23/19 0650)  Temp Source: Temporal (05/23/19 0650)  Respirations: 16 (05/23/19 0650)  Height: 5' 4" (162 6 cm) (05/13/19 1528)  Weight - Scale: 67 2 kg (148 lb 2 4 oz) (05/05/19 1926)  SpO2: 100 % (05/23/19 0650)    Physical Exam   Constitutional: She is oriented to person, place, and time  She appears well-developed and well-nourished  No distress  HENT:   Head: Normocephalic and atraumatic  Eyes: Pupils are equal, round, and reactive to light  Conjunctivae and EOM are normal  No scleral icterus  Neck: Normal range of motion  Neck supple  Cardiovascular: Normal rate, regular rhythm and normal heart sounds  Exam reveals no friction rub  No murmur heard  Pulmonary/Chest: Effort normal and breath sounds normal  No respiratory distress  She has no wheezes  Abdominal: Soft  Bowel sounds are normal  There is no tenderness  Musculoskeletal: She exhibits no deformity  Lymphadenopathy:     She has no cervical adenopathy  Neurological: She is alert and oriented to person, place, and time  She displays normal reflexes  No cranial nerve deficit or sensory deficit  She exhibits normal muscle tone  Skin: Skin is warm and dry  No rash noted       Entire H&P was discussed with Dr Nikki Pope who agreed to what is noted above    Corbin Lang MD  05/23/19  1:36 PM

## 2019-05-23 NOTE — ASSESSMENT & PLAN NOTE
- Longstanding history of COPD with asthma and is chronically on oxygen therapy at 3L  - Continue Breo Ellipta and Theophylline daily with albuterol inhalers as needed for wheezing

## 2019-05-23 NOTE — PLAN OF CARE
Problem: OCCUPATIONAL THERAPY ADULT  Goal: Performs self-care activities at highest level of function for planned discharge setting  See evaluation for individualized goals  Description  Treatment Interventions: ADL retraining, Continued evaluation, Activityengagement(coping skills instruction, life management instruction)          See flowsheet documentation for full assessment, interventions and recommendations  5/23/2019 1216 by Raeann Patten OT  Outcome: Progressing  Note:   Limitation: Decreased ADL status, Decreased high-level ADLs, Mood limitation(guarded, limited coping and life management skills)  Prognosis: Fair  Assessment: Kennyth Sever was present, alert, engaged throughout this second morning OT session, socialization, as she sat in the monitored dayroom  She did carry out some of the morning stretch exercises  She was talkative, pleasant  She attended to current event stories, I e , demolition of [employer], and she attended to visual modalities relevant to stories  She was pleasant, initiating throughout the session, she did introduce topics of interest  She provided numerous details in her comments and conversation  Her affect was friendly, positive  Her comments were relevant to discussion points  Progress has been consistent towards her goals  Continue to encourage Patsy to regularly join OT group program  Commend her efforts and contributions to group process

## 2019-05-23 NOTE — PLAN OF CARE
Problem: OCCUPATIONAL THERAPY ADULT  Goal: Performs self-care activities at highest level of function for planned discharge setting  See evaluation for individualized goals  Description  Treatment Interventions: ADL retraining, Endurance training, Continued evaluation, Activityengagement(coping skills training, life management training)          See flowsheet documentation for full assessment, interventions and recommendations  5/23/2019 1450 by Edgardo Guevara OT  Outcome: Progressing  Note:   Limitation: Decreased ADL status, Decreased high-level ADLs, Mood limitation(guarded, limited coping and life management skills)  Prognosis: Fair  Assessment: Rex Martinez was sitting in her room when encouraged to join in OT leisure activity  She stated that she was somewhat tired, she had just been out for lunch  She did state that she was interested in doing art/ coloring activities that this writer had with  I did sit with her, I did tell her I could sit with her in her room while she attended to given art tasks  She stated that she would prefer to work on art tasks herself, but she did wish to look through the pictures and pick some out  She did look through the pictures, but she also talked about past memories of work (she and this writer had worked at the same place in the past, but not at the same time)  She was positive, pleasant  She talked more about the place she lived in [address] [place] [address]  She appeared somewhat interested in this as a possible place to return to, but she also expressed concerns with leaving this area because she felt the available medical care was overall better  She did talk about where she had been before admission, [place]  She stated that she did not get the assistance she needed for such things as bathing (she stated that she had this at the other place)  She also stated that the environment was difficult for her   She did appear motivated to consider options, she appeared somewhat interested in the place she had been before  This writer did encourage her to explore discharge options with her   Carlos Cardoza was reasonable in her interactions during this OT involvement when presenting questions, ideas as this related to future discharge  She did choose individual velvet picture activities, she was provided with safe supplies to color with  She had been well-motivated for involvement on this day  Progress has been consistent towards her goals  Continue to promote positive investment in her treatment regime

## 2019-05-23 NOTE — PROGRESS NOTES
[facility]  Psychiatrists  Progress Note    Patient Name: Jerrell Tilley  MRN: [id number]  DOS: 05/22/2019    Chief Complaint:  paranoid delusions    Interval History: Per staff, patient continues to test boundaries and needs frequent limit setting  Patient now denies any side effects of paliperidone and agrees to continue titration  She remains paranoid about staff at the residence she came from  Medication compliance is fair with significant encouragement    Adverse effects of medications: denies    Mental Status Exam [Per above +]  Appearance: unkempt, minimal hygiene; no abnormal involuntary movements noted  Behavior: superficially engaged  Speech: pressured  Mood: upset  Affect: constricted  Thought process: tangential and illogical  Thought content: paranoid delusions remain; denies SI/HI  Perceptual disturbances: denies AH/VH  Cognition: oriented to all domains     Insight: poor  Judgement: limited        Current Facility-Administered Medications:     acetaminophen (TYLENOL) tablet 650 mg, 650 mg, Oral, Q6H PRN, Fabiano Wood MD    albuterol (PROVENTIL HFA,VENTOLIN HFA) inhaler 2 puff, 2 puff, Inhalation, Q4H PRN, Kofimaria m Carranza MD, 2 puff at 05/22/19 0830    aluminum-magnesium hydroxide-simethicone (MYLANTA) 200-200-20 mg/5 mL oral suspension 15 mL, 15 mL, Oral, Q4H PRN, Courtney Nayak MD, 15 mL at 05/12/19 2221    ammonium lactate (LAC-HYDRIN) 12 % lotion, , Topical, BID PRN, ABILIO Snow    benzonatate (TESSALON PERLES) capsule 100 mg, 100 mg, Oral, TID PRN, Fabiano Wood MD    benztropine (COGENTIN) injection 1 mg, 1 mg, Intramuscular, Q8H PRN, Courtney Nayak MD    benztropine (COGENTIN) tablet 1 mg, 1 mg, Oral, Q8H PRN, Courtney Nayak MD    enoxaparin (LOVENOX) subcutaneous injection 40 mg, 40 mg, Subcutaneous, Daily, Fabiano Wood MD    fluticasone-vilanterol (BREO ELLIPTA) 200-25 MCG/INH inhaler 1 puff, 1 puff, Inhalation, Daily, Belinda Navarro MD, 1 puff at 05/22/19 0826    haloperidol (HALDOL) oral concentrated solution 1 mg, 1 mg, Oral, Q6H PRN, Mikal Dang MD    haloperidol lactate (HALDOL) injection 2 mg, 2 mg, Intramuscular, Q6H PRN, Mikal Dang MD    hydrOXYzine HCL (ATARAX) tablet 25 mg, 25 mg, Oral, Q6H PRN, Mikal Dang MD    levothyroxine tablet 125 mcg, 125 mcg, Oral, Early Morning, Lynne Donohue MD, 125 mcg at 05/22/19 0539    magnesium hydroxide (MILK OF MAGNESIA) 400 mg/5 mL oral suspension 30 mL, 30 mL, Oral, Daily PRN, Chester Chavis MD    paliperidone (INVEGA) 24 hr tablet 6 mg, 6 mg, Oral, Daily, Jenniffer Pimentel MD, 6 mg at 05/22/19 0825    pantoprazole (PROTONIX) EC tablet 40 mg, 40 mg, Oral, Early Morning, Lynne Donohue MD, 40 mg at 05/22/19 0539    polyethylene glycol (MIRALAX) packet 17 g, 17 g, Oral, Daily PRN, ABILIO Alfred    risperiDONE (RisperDAL M-TABS) dispersible tablet 1 mg, 1 mg, Oral, Q8H PRN, Mikal Dang MD    theophylline (JEF-24) 24 hr capsule 200 mg, 200 mg, Oral, Daily, Lynne Donohue MD, 200 mg at 05/22/19 0825    traZODone (DESYREL) tablet 25 mg, 25 mg, Oral, HS PRN, Lynne Donohue MD    tuberculin injection 5 Units, 5 Units, Intradermal, Once, Jenniffer Pimentel MD, Stopped at 05/22/19 1425    Vitals:    05/22/19 2100   BP: 117/65   Pulse: 83   Resp: 18   Temp: 98 °F (36 7 °C)   SpO2: 99%       Lab/work up: no new labs    Assessment:     Axis I:  · Schizoaffective disorder; has been treatment resistant  Axis II:  · OCPD  · Cluster B traits  Axis III:  - recovering from sepsis 2/2 CAP, COPD with asthma, stable compression fracture of L4   Axis IV:  · Limited social support, chronically homeless, financial problems, on disability, noncompliant with treatment    Progress: limited    Plan:   1  Disposition: Patient meets criteria for ongoing acute inpatient treatment due to being danger to self 2/2 psychosis and poor self care   Patient will be discharged when there is an absence of psychosis [id number]  2  Legal status: 304  3  Psychopharmacologic interventions:  - increase paliperidone to 9mg po daily  - Continue to monitor psychosis  4  Medical comorbidities: Hospitalist following PRN  5  Other therapies: Individual/group/milieu therapy as appropriate   6  Social issues: Case management following to arrange disposition - will pursue state hospitalization instead as patient is increasingly more psychotic  7   Precautions: Routine q15min checks, vitals qshift    Lexi Andrew MD  05/22/2019

## 2019-05-23 NOTE — OCCUPATIONAL THERAPY NOTE
Occupational Therapy Activity Treatment Note      Ashleigh Rutherford    5/23/2019    Patient Active Problem List   Diagnosis    Sepsis [facility]    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[facility]    Tobacco use disorder, continuous    Bipolar disorder ([facility]    Left hip pain    Lactic acidosis    Hypokalemia    Hypomagnesemia    Compression fracture of L4 lumbar vertebra    Thoracic compression fracture (HCC)    Ventral hernia    Parapneumonic effusion    Acute on chronic respiratory failure with hypoxia (HCC)    Chronic [facility] failure [facility]    Hypophosphatemia    Elevated MCV    Compression deformity of vertebra    Schizoaffective disorder, bipolar type ([facility]    Acquired hypothyroidism    Gastroesophageal reflux disease without esophagitis    Abnormal CT of the chest    Excessive cerumen in left ear canal    Lipoma of right upper extremity    Polydipsia    Localized swelling of both lower legs       Past Medical History:   Diagnosis Date    Acid reflux     Anxiety     Asthma     Bipolar 1 disorder (HCC)     Chronic pain disorder     Chronic respiratory failure (HCC)     Compression fracture of fourth lumbar vertebra ([facility]     COPD (chronic obstructive pulmonary disease) (HCC)     Depression     GERD (gastroesophageal reflux disease)     History of home oxygen therapy     Hypothyroidism     Lipoma of upper extremity     Psychiatric illness     Schizoaffective disorder ([facility]     Substance abuse ([facility] )     Nicotine    Thoracic compression fracture ([facility]     Ventral hernia        No past surgical history on file  05/23/19 2110   Assessment   Assessment Patsy was sitting in her room when encouraged to join in OT leisure activity  She stated that she was somewhat tired, she had just been out for lunch  She did state that she was interested in doing art/ coloring activities that this writer had with  I did sit with her, I did tell her I could sit with her in her room while she attended to given art tasks  She stated that she would prefer to work on art tasks herself, but she did wish to look through the pictures and pick some out  She did look through the pictures, but she also talked about past memories of work (she and this writer had worked at the same place in the past, but not at the same time)  She was positive, pleasant  She talked more about the place she lived in [address] [place] [address]  She appeared somewhat interested in this as a possible place to return to, but she also expressed concerns with leaving this area because she felt the available medical care was overall better  She did talk about where she had been before admission, [place]  She stated that she did not get the assistance she needed for such things as bathing (she stated that she had this at the other place)  She also stated that the environment was difficult for her  She did appear motivated to consider options, she appeared somewhat interested in the place she had been before  This writer did encourage her to explore discharge options with her   Melody Haney was reasonable in her interactions during this OT involvement when presenting questions, ideas as this related to future discharge  She did choose individual velvet picture activities, she was provided with safe supplies to color with  She had been well-motivated for involvement on this day  Progress has been consistent towards her goals  Continue to promote positive investment in her treatment regime  Plan   Treatment Interventions ADL retraining; Endurance training;Continued evaluation; Activityengagement  (coping skills training, life management training)   Goal Expiration Date 06/06/19   Treatment Day 23   OT Frequency 5x/wk   Vonda Andrews OT

## 2019-05-23 NOTE — OCCUPATIONAL THERAPY NOTE
Occupational Therapy Group Treatment Note      [place] Goes    5/23/2019    Patient Active Problem List   Diagnosis    [facility]    Pneumonia of right upper lobe due to infectious organism ([address] [facility]    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[address] [facility]    Tobacco use disorder, continuous    Bipolar disorder ([address] [facility]    Left hip pain    Lactic acidosis    Hypokalemia    Hypomagnesemia    Compression fracture of L4 lumbar vertebra    Thoracic compression fracture (HCC)    Ventral hernia    Parapneumonic effusion    Acute on chronic respiratory failure with hypoxia (HCC)    Chronic respiratory failure (HCC)    Hypophosphatemia    Elevated MCV    Compression deformity of vertebra    Schizoaffective disorder, bipolar type ([facility]    Acquired hypothyroidism    Gastroesophageal reflux disease without esophagitis    Abnormal CT of the chest    Excessive cerumen in left ear canal    Lipoma of right upper extremity    Polydipsia    Localized swelling of both lower legs       Past Medical History:   Diagnosis Date    Acid reflux     Anxiety     Asthma     Bipolar 1 disorder (HCC)     Chronic pain disorder     Chronic respiratory failure (HCC)     Compression fracture of fourth lumbar vertebra ([address] [facility]     COPD (chronic obstructive pulmonary disease) (HCC)     Depression     GERD (gastroesophageal reflux disease)     History of home oxygen therapy     Hypothyroidism     Lipoma of upper extremity     Psychiatric illness     Schizoaffective disorder ([address] [facility]     Substance abuse ([facility]     Nicotine    Thoracic compression fracture ([facility]     Ventral hernia        No past surgical history on file  05/23/19 1010   Assessment   Assessment Patsy was pleasant, alert, engaged for this morning OT Life Management session  She was talkative, initiating as the group discussed journal keeping and the benefits of journal keeping   She was provided a journal, handouts concerning different ways to journal  She explored the benefits of gratitude journals, self nuturing journals, etc  She was engaged and supportive of group process  Progress has been consistent towards her goals on this occasion  She was commended and encouraged to regularly join program, and she was reminded of her contributions when in OT group  Plan   Treatment Interventions ADL retraining; Endurance training;Continued evaluation; Activityengagement  (coping skills instruction, life management instruction)   Goal Expiration Date 06/06/19   Treatment Day 23   OT Frequency 5x/wk   Dayanara Purcell, OT

## 2019-05-23 NOTE — NURSING NOTE
Patient asking for albuterol inhaler at 2200, repetitive about having used this for 9 years and needing to continue using it at this time  Clear and diminished b/l lung sound on ascultation, assessed by 2 RN's (Christopher Bliss  RN and Patricio Ribera RN)  Reasoning for inappropriate use of albuterol explained, calm and not argumentative with staff this evening  No apparent distress noted this shift and visible in dayroom watching TV, selectively social at times  In bed asleep at this time without further incidence

## 2019-05-23 NOTE — PROGRESS NOTES
Patient is alert and oriented x4  Is pleasant and cooperative with approach  Visual on the unit  Hyper verbal  reports emotional improvement since yesterday  Denies anxiety this time   Denies SI/HI or hallucinations  Compliant with incentive spirometer  No SOB or wheezing on respiratory assessment  Medication and meals compliant  Reinforced importance of self care and respiratory activities consistence  Will keep monitoring for safety and support

## 2019-05-23 NOTE — PLAN OF CARE
Problem: OCCUPATIONAL THERAPY ADULT  Goal: Performs self-care activities at highest level of function for planned discharge setting  See evaluation for individualized goals  Description  Treatment Interventions: ADL retraining, Endurance training, Continued evaluation, Activityengagement(coping skills instruction, life management instruction)          See flowsheet documentation for full assessment, interventions and recommendations  Outcome: Progressing  Note:   Limitation: Decreased ADL status, Decreased high-level ADLs, Mood limitation(guarded, limited coping and life management skills)  Prognosis: Fair  Assessment: Azell Closs was sitting in the monitored dayroom on this day  She was approached for individual OT involvement and conversation  She was wearing O2, she was pleasant on approach  She talked about how she was not provided her inhaler earlier because she was told that she was not wheezing, but she did then agree that she was OK at this time  She did talk about a place she had been living in [facility], that she really liked this facility but then decided to leave because "there were too many old people with too much drama"  I asked her if there is anything at this time that we can help her with, I asked her how she is doing for personal care  She stated that she had done her own bathing at the facility in [facility], that now she does need more help  She did then state that she does her own dressing, but she does receive supervision for bathing, attributing this to always being on O2  She did share that she really enjoys crafts, coloring, word finds, etc  I did encourage her to join us in future crafts activities (and I attempted to set up time for this day for her to engage) and she did appear positive and interested in this  She was pleasant, she did engage in trivia and reminiscing  She was invested in discussion, her affect was bright, her comments were organized and relevant   Continue to encourage positive mood, positive energy investment in OT program assignment  Encourage Patsy to voice her concerns, explore ways for her to actively engage in her treatment while promoting personal health and wellness

## 2019-05-24 PROCEDURE — 99232 SBSQ HOSP IP/OBS MODERATE 35: CPT | Performed by: PSYCHIATRY & NEUROLOGY

## 2019-05-24 PROCEDURE — 99253 IP/OBS CNSLTJ NEW/EST LOW 45: CPT | Performed by: FAMILY MEDICINE

## 2019-05-24 PROCEDURE — 97150 GROUP THERAPEUTIC PROCEDURES: CPT

## 2019-05-24 RX ADMIN — THEOPHYLLINE ANHYDROUS 200 MG: 200 CAPSULE, EXTENDED RELEASE ORAL at 08:56

## 2019-05-24 RX ADMIN — FLUTICASONE FUROATE AND VILANTEROL TRIFENATATE 1 PUFF: 200; 25 POWDER RESPIRATORY (INHALATION) at 08:57

## 2019-05-24 RX ADMIN — PANTOPRAZOLE SODIUM 40 MG: 40 TABLET, DELAYED RELEASE ORAL at 05:52

## 2019-05-24 RX ADMIN — LEVOTHYROXINE SODIUM 125 MCG: 125 TABLET ORAL at 05:52

## 2019-05-24 RX ADMIN — PALIPERIDONE 6 MG: 6 TABLET, FILM COATED, EXTENDED RELEASE ORAL at 08:56

## 2019-05-24 NOTE — PROGRESS NOTES
Alert,awake and visible on the unit  Pleasant with staff  Pt only accepted Invega 6 mg today,stated she was afraid of taking the ordered 9 mg because she might need abdominal surgery and that will counteract with anesthesia medication  Refused Lovenox,education provided  Noted to be attending groups  Will continue to monitor closely

## 2019-05-24 NOTE — OCCUPATIONAL THERAPY NOTE
Occupational Therapy Group Treatment Note      Laina Listen    5/24/2019    Patient Active Problem List   Diagnosis    [facility]    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    Tobacco use disorder, continuous    Bipolar disorder [facility]    Left hip pain    Lactic acidosis    Hypokalemia    Hypomagnesemia    Compression fracture of L4 lumbar vertebra    Thoracic compression fracture (HCC)    Ventral hernia    Parapneumonic effusion    Acute on chronic respiratory failure with hypoxia (HCC)    Chronic respiratory failure (HCC)    Hypophosphatemia    Elevated MCV    Compression deformity of vertebra    Schizoaffective disorder, bipolar type ([facility]    Acquired hypothyroidism    Gastroesophageal reflux disease without esophagitis    Abnormal CT of the chest    Excessive cerumen in left ear canal    Lipoma of right upper extremity    Polydipsia    Localized swelling of both lower legs       Past Medical History:   Diagnosis Date    Acid reflux     Anxiety     Asthma     Bipolar 1 disorder (HCC)     Chronic pain disorder     Chronic respiratory failure (HCC)     Compression fracture of fourth lumbar vertebra (HCC)     COPD (chronic obstructive pulmonary disease) (HCC)     Depression     GERD (gastroesophageal reflux disease)     History of home oxygen therapy     Hypothyroidism     Lipoma of upper extremity     Psychiatric illness     [facility]     Substance abuse [facility]     Nicotine    Thor[facility] compression fracture [facility]     Ventral hernia        No past surgical history on file  05/24/19 1008   Assessment   Assessment Ernesto Marrero was present, alert, engaged in this morning OT Life Management session  She was pleasant, upbeat as the group discussed wellness mottos and encouraging messages  She was provided with handouts  She freely initiated relevant responses, her insights were good overall when exploring the more analytical meaning of select mottos   Progress has been consistent towards her goals  Her motivation for program investment has been good  Continue to promote calm, reality focussed give and take interactions via OT group assignment  Plan   Treatment Interventions ADL retraining;Continued evaluation; Activityengagement  (coping skills training, life management instruction)   Goal Expiration Date 06/06/19   Treatment Day 24   OT Frequency 5x/wk   Alex Rincon, OT

## 2019-05-24 NOTE — NURSING NOTE
Patient was complaining about her O2 level being less than 3 liters  Patient thought her roommate changed the settings which is not true  Writer checked her O2 level and it was up to 4 L  Patient was educated to not to change the settings of O2, and O2 placed on 3 L as ordered   Will continue to monitor per protocol

## 2019-05-24 NOTE — PLAN OF CARE
Problem: OCCUPATIONAL THERAPY ADULT  Goal: Performs self-care activities at highest level of function for planned discharge setting  See evaluation for individualized goals  Description  Treatment Interventions: ADL retraining, Continued evaluation, Activityengagement(coping skills training, life management instruction)          See flowsheet documentation for full assessment, interventions and recommendations  Outcome: Progressing  Note:   Limitation: Decreased ADL status, Decreased high-level ADLs, Mood limitation(guarded, limited coping and life management skills)  Prognosis: Fair  Assessment: Patricio Riggs was present, alert, engaged in this morning OT Life Management session  She was pleasant, upbeat as the group discussed wellness mottos and encouraging messages  She was provided with handouts  She freely initiated relevant responses, her insights were good overall when exploring the more analytical meaning of select mottos  Progress has been consistent towards her goals  Her motivation for program investment has been good  Continue to promote calm, reality focussed give and take interactions via OT group assignment

## 2019-05-24 NOTE — CASE MANAGEMENT
Writer spoke with Rahat Krishnamurthy from [facility] as requested by patient  Maribell informed writer patient is unable to return to facility  CM will make patient aware  Patient still aware that she will be going to the [facility] hospital when bed is available  Patient still accusatory that staff is trying to kill her, believes that she does not need medication, and continues to be somatic about medical conditions  CM will continue to follow and provide services as needed

## 2019-05-24 NOTE — PLAN OF CARE
Problem: OCCUPATIONAL THERAPY ADULT  Goal: Performs self-care activities at highest level of function for planned discharge setting  See evaluation for individualized goals  Description  Treatment Interventions: ADL retraining, Continued evaluation, Activityengagement(coping skills instruction, life management instruction)          See flowsheet documentation for full assessment, interventions and recommendations  5/24/2019 1605 by Lissa Vivar OT  Outcome: Progressing  Note:   Limitation: Decreased ADL status, Decreased high-level ADLs, Mood limitation(guarded, limited coping and life management skills)  Prognosis: Fair  Assessment: [facility] was present for this second morning OT session  She was pleasant, she did engage in some morning stretch exercises, she did attend to current events discussion, this day in history discussion, and memorial day trivia and discussion  She was positive, pleasant in her interactions during group  She did initiate relevant comments and questions  She did discuss positive coping strategies during positive attitude toss and talk about activity  Her affect was bright, friendly  Her comments were relevant and focussed on topics at hand

## 2019-05-24 NOTE — NURSING NOTE
Patient isolative this evening  She reports anxiety and said the reason is the change in her inhaler  Patient said she is used to have it frequently  Patient reinforced about the proper use of inhalers  Her lungs are clear on auscultation, diminished at bases  No wheezes noticed  Patient showed understanding about medication proper use  No further complaints   Will continue to monitor per protocol

## 2019-05-24 NOTE — PROGRESS NOTES
Patient is calm and pleasant  She has continuous O2 at 3L/minute  She is social but then retreats back to her room  She called her son and a friend and complained we don't listen to her     When she talks there is still persecutory paranoia (things will or people will harm her)

## 2019-05-24 NOTE — OCCUPATIONAL THERAPY NOTE
Occupational Therapy Group Treatment Note      Pradeep Crowley    5/24/2019    Patient Active Problem List   Diagnosis    [facility]    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    Tobacco use disorder, continuous    Bipolar disorder ([facility]    Left hip pain    Lactic acidosis    Hypokalemia    Hypomagnesemia    Compression fracture of L4 lumbar vertebra    Thoracic compression fracture (HCC)    Ventral hernia    Parapneumonic effusion    Acute on chronic respiratory failure with hypoxia (HCC)    Chronic respiratory failure (HCC)    Hypophosphatemia    Elevated MCV    Compression deformity of vertebra    Schizoaffective disorder, bipolar type ([facility]    Acquired hypothyroidism    Gastroesophageal reflux disease without esophagitis    Abnormal CT of the chest    Excessive cerumen in left ear canal    Lipoma of right upper extremity    Polydipsia    Localized swelling of both lower legs       Past Medical History:   Diagnosis Date    Acid reflux     Anxiety     Asthma     Bipolar 1 disorder (HCC)     Chronic pain disorder     Chronic respiratory failure (HCC)     Compression fracture of fourth lumbar vertebra (HCC)     COPD (chronic obstructive pulmonary disease) (HCC)     Depression     GERD (gastroesophageal reflux disease)     History of home oxygen therapy     Hypothyroidism     Lipoma of upper extremity     Psychiatric illness     [facility]oaffective disorder ([facility]     Substance abuse ([facility]     Nicotine    Thoracic compression fracture ([facility]     Ventral hernia        No past surgical history on file  05/24/19 1105   Assessment   Assessment Mat Bauman was present for this second morning OT session  She was pleasant, she did engage in some morning stretch exercises, she did attend to current events discussion, this day in history discussion, and memorial day trivia and discussion  She was positive, pleasant in her interactions during group  She did initiate relevant comments and questions   She did discuss positive coping strategies during positive attitude toss and talk about activity  Her affect was bright, friendly  Her comments were relevant and focussed on topics at hand  Plan   Treatment Interventions ADL retraining;Continued evaluation; Activityengagement  (coping skills instruction, life management instruction)   Goal Expiration Date 06/06/19   Treatment Day 24   OT Frequency 5x/wk   Frank Gonzales, OT

## 2019-05-25 RX ADMIN — THEOPHYLLINE ANHYDROUS 200 MG: 200 CAPSULE, EXTENDED RELEASE ORAL at 08:37

## 2019-05-25 RX ADMIN — LEVOTHYROXINE SODIUM 125 MCG: 125 TABLET ORAL at 05:34

## 2019-05-25 RX ADMIN — Medication 1 APPLICATION: at 11:14

## 2019-05-25 RX ADMIN — FLUTICASONE FUROATE AND VILANTEROL TRIFENATATE 1 PUFF: 200; 25 POWDER RESPIRATORY (INHALATION) at 07:39

## 2019-05-25 RX ADMIN — PANTOPRAZOLE SODIUM 40 MG: 40 TABLET, DELAYED RELEASE ORAL at 05:34

## 2019-05-25 NOTE — PROGRESS NOTES
[address] [facility]  Psychiatrists  Progress Note    Patient Name: Avni Garnett  MRN: [id number]  DOS: 05/24/19    Chief Complaint:  paranoid delusions    Interval History: Per staff, patient refused to take all of her 9mg po paliperidone, reportedly due to patient thinking that she may need to have surgery due to some abdominal issue and worried paliperidone will interfere with the anesthesia  Patient is evasive and in denial of having said any such thing  Refusing PPD still  Medication compliance is inconsistent despite significant encouragement    Adverse effects of medications: patient thinks now it's causing GI upset    Mental Status Exam [Per above +]  Appearance: minimal grooming; adequate hygiene; no abnormal involuntary movements noted; no apparent distress  Behavior: dominates conversation  Speech: wnl  Mood: unable to ascertain  Affect: constricted, stable  Thought process: tangential, illogical  Thought content: paranoid delusions are ongoing  Perceptual disturbances: denies AH/VH  Cognition: oriented to all domains     Insight: poor  Judgement: poor          Current Facility-Administered Medications:     acetaminophen (TYLENOL) tablet 650 mg, 650 mg, Oral, Q6H PRN, Anu Gomez MD    albuterol (PROVENTIL HFA,VENTOLIN HFA) inhaler 2 puff, 2 puff, Inhalation, Q4H PRN, Eun Omalley MD, 2 puff at 05/22/19 0830    aluminum-magnesium hydroxide-simethicone (MYLANTA) 200-200-20 mg/5 mL oral suspension 15 mL, 15 mL, Oral, Q4H PRN, Ginette Chavarria MD, 15 mL at 05/12/19 2221    ammonium lactate (LAC-HYDRIN) 12 % lotion, , Topical, BID PRN, ABILIO Germain    benzonatate (TESSALON PERLES) capsule 100 mg, 100 mg, Oral, TID PRN, Anu Gomez MD    benztropine (COGENTIN) injection 1 mg, 1 mg, Intramuscular, Q8H PRN, Ginette Chavarria MD    benztropine (COGENTIN) tablet 1 mg, 1 mg, Oral, Q8H PRN, Ginette Chavarria MD  [facility] enoxaparin (LOVENOX) subcutaneous injection 40 mg, 40 mg, Subcutaneous, Daily, Hollie Rubin MD    fluticasone-vilanterol (BREO ELLIPTA) 200-25 MCG/INH inhaler 1 puff, 1 puff, Inhalation, Daily, Josh Gonzalez MD, 1 puff at 05/24/19 0857    haloperidol (HALDOL) oral concentrated solution 1 mg, 1 mg, Oral, Q6H PRN, Rosa Arroyo MD    haloperidol lactate (HALDOL) injection 2 mg, 2 mg, Intramuscular, Q6H PRN, Rosa Arroyo MD    hydrOXYzine HCL (ATARAX) tablet 25 mg, 25 mg, Oral, Q6H PRN, Rosa Arroyo MD    levothyroxine tablet 125 mcg, 125 mcg, Oral, Early Morning, Hollie Rubin MD, 125 mcg at 05/24/19 4130    magnesium hydroxide (MILK OF MAGNESIA) 400 mg/5 mL oral suspension 30 mL, 30 mL, Oral, Daily PRN, Uday Lewis MD    paliperidone (INVEGA) 24 hr tablet 9 mg, 9 mg, Oral, Daily, Rhiannon Mcmillan MD, 6 mg at 05/24/19 0856    pantoprazole (PROTONIX) EC tablet 40 mg, 40 mg, Oral, Early Morning, Hollie Rubin MD, 40 mg at 05/24/19 3477    polyethylene glycol (MIRALAX) packet 17 g, 17 g, Oral, Daily PRN, Theopolis Brenden, CRNP    risperiDONE (RisperDAL M-TABS) dispersible tablet 1 mg, 1 mg, Oral, Q8H PRN, Rosa Arroyo MD    theophylline (JEF-24) 24 hr capsule 200 mg, 200 mg, Oral, Daily, Hollie Rubin MD, 200 mg at 05/24/19 0856    traZODone (DESYREL) tablet 25 mg, 25 mg, Oral, HS PRN, Hollie Rubin MD    tuberculin injection 5 Units, 5 Units, Intradermal, Once, Rhiannon Mcmillan MD, Stopped at 05/22/19 1425    Vitals:    05/24/19 2100   BP: 116/58   Pulse: 102   Resp: 16   Temp: 98 9 °F (37 2 °C)   SpO2: 95%       Lab/work up: no new labs    Assessment:     Axis I:  · Schizoaffective disorder; has been treatment resistant  Axis II:  · OCPD  · Cluster B traits  Axis III:  - recovering from sepsis 2/2 CAP, COPD with asthma, stable compression fracture of L4   Axis IV:  · Limited social support, chronically homeless, financial problems, on disability, noncompliant with treatment    Progress: limited    Plan:   1  Disposition: Patient meets criteria for ongoing acute inpatient treatment due to being danger to self 2/2 psychosis and poor self care  Patient will be discharged when there is an absence of psychosis [id number]  2  Legal status: 304  3  Psychopharmacologic interventions:  - patient now agrees to take paliperidone 9mg po daily   - will consider haldol if she refuses  - Continue to monitor psychosis  4  Medical comorbidities: Hospitalist following PRN; still refusing PPD  5  Other therapies: Individual/group/milieu therapy as appropriate   6  Social issues: Case management following to arrange disposition - will pursue state hospitalization instead as patient is increasingly more psychotic  7   Precautions: Routine q15min checks, vitals jazminhift    Kamron Ramsay MD  05/24/19

## 2019-05-25 NOTE — PLAN OF CARE
Problem: Alteration in Thoughts and Perception  Goal: Treatment Goal: Gain control of psychotic behaviors/thinking, reduce/eliminate presenting symptoms and demonstrate improved reality functioning upon discharge  Outcome: Progressing  Goal: Verbalize thoughts and feelings  Description  Interventions:  - Promote a nonjudgmental and trusting relationship with the patient through active listening and therapeutic communication  - Assess patient's level of functioning, behavior and potential for risk  - Engage patient in 1 on 1 interactions for a minimum of 15 minutes each session  - Encourage patient to express fears, feelings, frustrations, and discuss symptoms    - [place] patient to reality, help patient recognize reality-based thinking   - Administer medications as ordered and assess for potential side effects  - Provide the patient education related to the signs and symptoms of the illness and desired effects of prescribed medications  Outcome: Progressing  Goal: Refrain from acting on delusional thinking/internal stimuli  Description  Interventions:  - Monitor patient closely, per order   - Utilize least restrictive measures   - Set reasonable limits, give positive feedback for acceptable   - Administer medications as ordered and monitor of potential side effects  Outcome: Progressing  Goal: Agree to be compliant with medication regime, as prescribed and report medication side effects  Description  Interventions:  - Offer appropriate PRN medication and supervise ingestion; conduct aims, as needed   Outcome: Not Progressing  Goal: Attend and participate in unit activities, including therapeutic, recreational, and educational groups  Description  Interventions:  - Provide therapeutic and educational activities daily, encourage attendance and participation, and document same in the medical record   Outcome: Progressing  Goal: Recognize dysfunctional thoughts, communicate reality-based thoughts at the time of discharge  Description  Interventions:  - Provide medication and psycho-education to assist patient in compliance and developing insight into his/her illness   Outcome: Not Progressing  Goal: Complete daily ADLs, including personal hygiene independently, as able  Description  Interventions:  - Observe, teach, and assist patient with ADLS  - Monitor and promote a balance of rest/activity, with adequate nutrition and elimination   Outcome: Not Progressing     Problem: Ineffective Coping  Goal: Cooperates with admission process  Description  Interventions:   - Complete admission process  Outcome: Progressing  Goal: Identifies ineffective coping skills  Outcome: Not Progressing  Goal: Identifies healthy coping skills  Outcome: Progressing  Goal: Demonstrates healthy coping skills  Outcome: Progressing  Goal: Participates in unit activities  Description  Interventions:  - Provide therapeutic environment   - Provide required programming   - Redirect inappropriate behaviors   Outcome: Progressing  Goal: Patient/Family participate in treatment and DC plans  Description  Interventions:  - Provide therapeutic environment  Outcome: Progressing  Goal: Patient/Family verbalizes awareness of resources  Outcome: Progressing  Goal: Understands least restrictive measures  Description  Interventions:  - Utilize least restrictive behavior  Outcome: Progressing  Goal: Free from restraint events  Description  - Utilize least restrictive measures   - Provide behavioral interventions   - Redirect inappropriate behaviors   Outcome: Progressing     Problem: Risk for Self Injury/Neglect  Goal: Treatment Goal: Remain safe during length of stay, learn and adopt new coping skills, and be free of self-injurious ideation, impulses and acts at the time of discharge  Outcome: Progressing  Goal: Verbalize thoughts and feelings  Description  Interventions:  - Assess and re-assess patient's lethality and potential for self-injury  - Engage patient in 1:1 interactions, daily, for a minimum of 15 minutes  - Encourage patient to express feelings, fears, frustrations, hopes  - Establish rapport/trust with patient   Outcome: Progressing  Goal: Refrain from harming self  Description  Interventions:  - Monitor patient closely, per order  - Develop a trusting relationship  - Supervise medication ingestion, monitor effects and side effects   Outcome: Progressing  Goal: Attend and participate in unit activities, including therapeutic, recreational, and educational groups  Description  Interventions:  - Provide therapeutic and educational activities daily, encourage attendance and participation, and document same in the medical record  - Obtain collateral information, encourage visitation and family involvement in care   Outcome: Progressing  Goal: Recognize maladaptive responses and adopt new coping mechanisms  Outcome: Progressing  Goal: Complete daily ADLs, including personal hygiene independently, as able  Description  Interventions:  - Observe, teach, and assist patient with ADLS  - Monitor and promote a balance of rest/activity, with adequate nutrition and elimination  Outcome: Not Progressing     Problem: Depression  Goal: Treatment Goal: Demonstrate behavioral control of depressive symptoms, verbalize feelings of improved mood/affect, and adopt new coping skills prior to discharge  Outcome: Progressing  Goal: Verbalize thoughts and feelings  Description  Interventions:  - Assess and re-assess patient's level of risk   - Engage patient in 1:1 interactions, daily, for a minimum of 15 minutes   - Encourage patient to express feelings, fears, frustrations, hopes   Outcome: Progressing  Goal: Refrain from harming self  Description  Interventions:  - Monitor patient closely, per order   - Supervise medication ingestion, monitor effects and side effects   Outcome: Progressing  Goal: Refrain from isolation  Description  Interventions:  - Develop a trusting relationship   - Encourage socialization   Outcome: Progressing  Goal: Refrain from self-neglect  Outcome: Progressing  Goal: Attend and participate in unit activities, including therapeutic, recreational, and educational groups  Description  Interventions:  - Provide therapeutic and educational activities daily, encourage attendance and participation, and document same in the medical record   Outcome: Progressing  Goal: Complete daily ADLs, including personal hygiene independently, as able  Description  Interventions:  - Observe, teach, and assist patient with ADLS  -  Monitor and promote a balance of rest/activity, with adequate nutrition and elimination   Outcome: Not Progressing     Problem: Anxiety  Goal: Anxiety is at manageable level  Description  Interventions:  - Assess and monitor patient's anxiety level  - Monitor for signs and symptoms of anxiety both physical and emotional (heart palpitations, chest pain, shortness of breath, headaches, nausea, feeling jumpy, restlessness, irritable, apprehensive)  - Collaborate with interdisciplinary team and initiate plan and interventions as ordered    - [place] patient to unit/surroundings  - Explain treatment plan  - Encourage participation in care  - Encourage verbalization of concerns/fears  - Identify coping mechanisms  - Assist in developing anxiety-reducing skills  - Administer/offer alternative therapies  - Limit or eliminate stimulants  Outcome: Progressing     Problem: DISCHARGE PLANNING - CARE MANAGEMENT  Goal: Discharge to post-acute care or home with appropriate resources  Description  INTERVENTIONS:  - Conduct assessment to determine patient/family and health care team treatment goals, and need for post-acute services based on payer coverage, community resources, and patient preferences, and barriers to discharge  - Address psychosocial, clinical, and financial barriers to discharge as identified in assessment in conjunction with the patient/family and health care team  - Arrange appropriate level of post-acute services according to patients   needs and preference and payer coverage in collaboration with the physician and health care team  - Communicate with and update the patient/family, physician, and health care team regarding progress on the discharge plan  - Arrange appropriate transportation to post-acute venues  Outcome: Progressing     Problem: Prexisting or High Potential for Compromised Skin Integrity  Goal: Skin integrity is maintained or improved  Description  INTERVENTIONS:  - Identify patients at risk for skin breakdown  - Assess and monitor skin integrity  - Assess and monitor nutrition and hydration status  - Monitor labs (i e  albumin)  - Assess for incontinence   - Turn and reposition patient  - Assist with mobility/ambulation  - Relieve pressure over bony prominences  - Avoid friction and shearing  - Provide appropriate hygiene as needed including keeping skin clean and dry  - Evaluate need for skin moisturizer/barrier cream  - Collaborate with interdisciplinary team (i e  Nutrition, Rehabilitation, etc )   - Patient/family teaching  Outcome: Progressing

## 2019-05-25 NOTE — PROGRESS NOTES
Patient slept all night without any complain and she is presently in bed  Compliant with her AM medications  Will continue to monitor

## 2019-05-25 NOTE — PROGRESS NOTES
Patient was in bed sleeping when the shift started  Breathing pattarn even and unlabored  Will continue to monitor

## 2019-05-25 NOTE — NURSING NOTE
Patient refusing to get OOB for lunch citing she doesn't feel well as having stayed in dayroom for too long a period of time after breakfast   No complaint of pain but patient reported she has "lemon" colored urine and wanted to know if this could be a side effect of her Nolberto Banana that she took yesterday  Reviewed side effects of this medication and educated patient on same  No complaints of urinary discomfort, frequency or odor noted  Patient did eat 100% for breakfast and agreed to come out of room for dinner  No other issues noted at this time

## 2019-05-25 NOTE — PROGRESS NOTES
Patient asked nurse to listen to her lungs  No wheezing; no rales or marcellochi  Asked that nasal cannula be changed  Cannula was changed and dated

## 2019-05-25 NOTE — PROGRESS NOTES
Psychiatry Progress Note    Subjective: Interval History     The patient is lying in bed this morning  She continues to be resistive to medication and is refusing her Jelly Cardoso today at the increased dose  Patient is irritable during discussion  She feels that nobody listens to her and what she needs  Patient continues to have paranoid thinking  Patient did sleep last evening  [facility] will be [facility] when bed is available  She has poor insight into her mental health  Patient reported being afraid to take an increased dose of her Jelly Cardoso because she felt she would need abdominal surgery yesterday      Behavior over the last 24 hours:  unchanged  Sleep: normal  Appetite: normal  Medication side effects: No  ROS: no complaints    Current medications:    Current Facility-Administered Medications:     acetaminophen (TYLENOL) tablet 650 mg, 650 mg, Oral, Q6H PRN, Felipa Johns MD    albuterol (PROVENTIL HFA,VENTOLIN HFA) inhaler 2 puff, 2 puff, Inhalation, Q4H PRN, Lesley Lenz MD, 2 puff at 05/22/19 0830    aluminum-magnesium hydroxide-simethicone (MYLANTA) 200-200-20 mg/5 mL oral suspension 15 mL, 15 mL, Oral, Q4H PRN, Eva Ambriz MD, 15 mL at 05/12/19 2221    ammonium lactate (LAC-HYDRIN) 12 % lotion, , Topical, BID PRN, ABILIO Cain    benzonatate (TESSALON PERLES) capsule 100 mg, 100 mg, Oral, TID PRN, Felipa Johns MD    benztropine (COGENTIN) injection 1 mg, 1 mg, Intramuscular, Q8H PRN, Eva Ambriz MD    benztropine (COGENTIN) tablet 1 mg, 1 mg, Oral, Q8H PRN, Eva Ambriz MD    enoxaparin (LOVENOX) subcutaneous injection 40 mg, 40 mg, Subcutaneous, Daily, Felipa Johns MD    fluticasone-vilanterol (BREO ELLIPTA) 200-25 MCG/INH inhaler 1 puff, 1 puff, Inhalation, Daily, Ulyses Boas, MD, 1 puff at 05/25/19 0739    haloperidol (HALDOL) oral concentrated solution 1 mg, 1 mg, Oral, Q6H PRN, Eva Ambriz MD    haloperidol lactate (HALDOL) injection 2 mg, 2 mg, Intramuscular, Q6H PRN, Pretty Lopez MD    hydrOXYzine HCL (ATARAX) tablet 25 mg, 25 mg, Oral, Q6H PRN, Pretty Lopez MD    levothyroxine tablet 125 mcg, 125 mcg, Oral, Early Morning, Zeeshan Taylor MD, 125 mcg at 05/25/19 0534    magnesium hydroxide (MILK OF MAGNESIA) 400 mg/5 mL oral suspension 30 mL, 30 mL, Oral, Daily PRN, Mihai Glover MD    paliperidone (INVEGA) 24 hr tablet 9 mg, 9 mg, Oral, Daily, Chelle Heredia MD, 6 mg at 05/24/19 0856    pantoprazole (PROTONIX) EC tablet 40 mg, 40 mg, Oral, Early Morning, Zeeshan Taylor MD, 40 mg at 05/25/19 0534    polyethylene glycol (MIRALAX) packet 17 g, 17 g, Oral, Daily PRN, ABILIO Clarke    risperiDONE (RisperDAL M-TABS) dispersible tablet 1 mg, 1 mg, Oral, Q8H PRN, Pretty Lopez MD    theophylline (JEF-24) 24 hr capsule 200 mg, 200 mg, Oral, Daily, Zeeshan Taylor MD, 200 mg at 05/25/19 9432    traZODone (DESYREL) tablet 25 mg, 25 mg, Oral, HS PRN, Zeeshan Taylor MD    tuberculin injection 5 Units, 5 Units, Intradermal, Once, Chelle Heredia MD, Stopped at 05/22/19 1425    Current Problem List:    Patient Active Problem List   Diagnosis    [facility]    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    Tobacco use disorder, continuous    Bipolar disorder ([facility]    Left hip pain    Lactic acidosis    Hypokalemia    Hypomagnesemia    Compression fracture of L4 lumbar vertebra    Thoracic compression fracture (HCC)    Ventral hernia    Parapneumonic effusion    Acute on chronic respiratory failure with hypoxia (HCC)    Chronic respiratory failure (HCC)    Hypophosphatemia    Elevated MCV    Compression deformity of vertebra    Schizoaffective disorder, bipolar type (HCC)    Acquired hypothyroidism    Gastroesophageal reflux disease without esophagitis    Abnormal CT of the chest    Excessive cerumen in left ear canal    Lipoma of right upper extremity    Polydipsia    Localized swelling of both lower legs       Problem list reviewed 05/25/19     Objective:     Vital Signs:  Vitals:    05/24/19 0706 05/24/19 1537 05/24/19 2100 05/25/19 0702   BP: 108/58 114/55 116/58 100/58   BP Location: Right arm Right arm Right arm Left arm   Pulse: 91 90 102 83   Resp: 16 16 16 20   Temp: 98 °F (36 7 °C) 97 9 °F (36 6 °C) 98 9 °F (37 2 °C) 97 8 °F (36 6 °C)   TempSrc: Temporal Temporal Tympanic Temporal   SpO2: 93% 97% 95% 98%   Weight:       Height:             Appearance:  age appropriate, casually dressed and disheveled   Behavior:  intense   Speech:  normal volume   Mood:  irritable   Affect:  increased in intensity   Thought Process:  tangential   Thought Content:  delusions  persecutory   Perceptual Disturbances: None   Risk Potential: none   Sensorium:  person, place, situation and time   Cognition:  intact   Consciousness:  alert and awake    Attention: attention span and concentration were age appropriate   Intellect: average   Insight:  poor   Judgment: poor      Motor Activity: no abnormal movements       Assessment / Plan:     Schizoaffective disorder, bipolar type ([facility] )    Recommended Treatment:      Medication changes:  1) continue current medication regimen  Patient encouraged to take increased dose of Invega  Non-pharmacological treatments  1) Continue with group therapy, milieu therapy and occupational therapy  Safety  1) Safety/communication plan established targeting dynamic risk factors above  2) Risks, benefits, and possible side effects of medications explained to patient and patient verbalizes understanding  Counseling / Coordination of Care    Total floor / unit time spent today 20 minutes  Greater than 50% of total time was spent with the patient and / or family counseling and / or coordination of care  A description of the counseling / coordination of care       Patient's Rights, confidentiality and exceptions to confidentiality, use of automated medical record, [address] staff access to medical record, and consent to treatment reviewed      [place] COLEMAN Christopher

## 2019-05-25 NOTE — NURSING NOTE
Patient refused PO Invega 9mg and SQ Lovenox  BARBARA Reeves for Psychiatry notified and patient educated on risks of DVT and use of Lovenox for prevention of same  No new orders noted at this time  Patient would like to speak with Dr Mamadou Ocampo on Monday regarding increase in [place] and use of anti-depressant in her medication profile  No complaints of pain or respiratory distress this morning  Patient was compliant with respiratory medication and reports she was initially here on medical unit for Pneumonia  Patient denies depressive or anxiety symptoms  Patient is visible on the unit and social with peers  Patient is also cooperative with staff  Patient reports she does not want to go back to her previous living arrangement and thus she has no where to go  Patient advised to speak with  on Monday regarding discharge plans and patient agreed with this plan  Per previous notes, patient is aware that they are seeking placement at [facility] and patient agreed to take her PO Invega although today she refused it stating she is not depressed  No paranoid thoughts noted or reported by patient  No other issues noted at this time

## 2019-05-25 NOTE — PLAN OF CARE
Problem: Alteration in Thoughts and Perception  Goal: Treatment Goal: Gain control of psychotic behaviors/thinking, reduce/eliminate presenting symptoms and demonstrate improved reality functioning upon discharge  Outcome: Progressing  Goal: Verbalize thoughts and feelings  Description  Interventions:  - Promote a nonjudgmental and trusting relationship with the patient through active listening and therapeutic communication  - Assess patient's level of functioning, behavior and potential for risk  - Engage patient in 1 on 1 interactions for a minimum of 15 minutes each session  - Encourage patient to express fears, feelings, frustrations, and discuss symptoms    - [place] patient to reality, help patient recognize reality-based thinking   - Administer medications as ordered and assess for potential side effects  - Provide the patient education related to the signs and symptoms of the illness and desired effects of prescribed medications  Outcome: Progressing  Goal: Refrain from acting on delusional thinking/internal stimuli  Description  Interventions:  - Monitor patient closely, per order   - Utilize least restrictive measures   - Set reasonable limits, give positive feedback for acceptable   - Administer medications as ordered and monitor of potential side effects  Outcome: Progressing  Goal: Agree to be compliant with medication regime, as prescribed and report medication side effects  Description  Interventions:  - Offer appropriate PRN medication and supervise ingestion; conduct aims, as needed   Outcome: Progressing  Goal: Attend and participate in unit activities, including therapeutic, recreational, and educational groups  Description  Interventions:  - Provide therapeutic and educational activities daily, encourage attendance and participation, and document same in the medical record   Outcome: Progressing  Goal: Recognize dysfunctional thoughts, communicate reality-based thoughts at the time of discharge  Description  Interventions:  - Provide medication and psycho-education to assist patient in compliance and developing insight into his/her illness   Outcome: Progressing  Goal: Complete daily ADLs, including personal hygiene independently, as able  Description  Interventions:  - Observe, teach, and assist patient with ADLS  - Monitor and promote a balance of rest/activity, with adequate nutrition and elimination   Outcome: Progressing     Problem: Ineffective Coping  Goal: Cooperates with admission process  Description  Interventions:   - Complete admission process  Outcome: Progressing  Goal: Identifies ineffective coping skills  Outcome: Progressing  Goal: Identifies healthy coping skills  Outcome: Progressing  Goal: Demonstrates healthy coping skills  Outcome: Progressing  Goal: Participates in unit activities  Description  Interventions:  - Provide therapeutic environment   - Provide required programming   - Redirect inappropriate behaviors   Outcome: Progressing  Goal: Patient/Family participate in treatment and DC plans  Description  Interventions:  - Provide therapeutic environment  Outcome: Progressing  Goal: Patient/Family verbalizes awareness of resources  Outcome: Progressing  Goal: Understands least restrictive measures  Description  Interventions:  - Utilize least restrictive behavior  Outcome: Progressing  Goal: Free from restraint events  Description  - Utilize least restrictive measures   - Provide behavioral interventions   - Redirect inappropriate behaviors   Outcome: Progressing     Problem: Risk for Self Injury/Neglect  Goal: Treatment Goal: Remain safe during length of stay, learn and adopt new coping skills, and be free of self-injurious ideation, impulses and acts at the time of discharge  Outcome: Progressing  Goal: Verbalize thoughts and feelings  Description  Interventions:  - Assess and re-assess patient's lethality and potential for self-injury  - Engage patient in 1:1 interactions, daily, for a minimum of 15 minutes  - Encourage patient to express feelings, fears, frustrations, hopes  - Establish rapport/trust with patient   Outcome: Progressing  Goal: Refrain from harming self  Description  Interventions:  - Monitor patient closely, per order  - Develop a trusting relationship  - Supervise medication ingestion, monitor effects and side effects   Outcome: Progressing  Goal: Attend and participate in unit activities, including therapeutic, recreational, and educational groups  Description  Interventions:  - Provide therapeutic and educational activities daily, encourage attendance and participation, and document same in the medical record  - Obtain collateral information, encourage visitation and family involvement in care   Outcome: Progressing  Goal: Recognize maladaptive responses and adopt new coping mechanisms  Outcome: Progressing  Goal: Complete daily ADLs, including personal hygiene independently, as able  Description  Interventions:  - Observe, teach, and assist patient with ADLS  - Monitor and promote a balance of rest/activity, with adequate nutrition and elimination  Outcome: Progressing     Problem: Depression  Goal: Treatment Goal: Demonstrate behavioral control of depressive symptoms, verbalize feelings of improved mood/affect, and adopt new coping skills prior to discharge  Outcome: Progressing  Goal: Verbalize thoughts and feelings  Description  Interventions:  - Assess and re-assess patient's level of risk   - Engage patient in 1:1 interactions, daily, for a minimum of 15 minutes   - Encourage patient to express feelings, fears, frustrations, hopes   Outcome: Progressing  Goal: Refrain from harming self  Description  Interventions:  - Monitor patient closely, per order   - Supervise medication ingestion, monitor effects and side effects   Outcome: Progressing  Goal: Refrain from isolation  Description  Interventions:  - Develop a trusting relationship   - Encourage socialization   Outcome: Progressing  Goal: Refrain from self-neglect  Outcome: Progressing  Goal: Attend and participate in unit activities, including therapeutic, recreational, and educational groups  Description  Interventions:  - Provide therapeutic and educational activities daily, encourage attendance and participation, and document same in the medical record   Outcome: Progressing  Goal: Complete daily ADLs, including personal hygiene independently, as able  Description  Interventions:  - Observe, teach, and assist patient with ADLS  -  Monitor and promote a balance of rest/activity, with adequate nutrition and elimination   Outcome: Progressing     Problem: Anxiety  Goal: Anxiety is at manageable level  Description  Interventions:  - Assess and monitor patient's anxiety level  - Monitor for signs and symptoms of anxiety both physical and emotional (heart palpitations, chest pain, shortness of breath, headaches, nausea, feeling jumpy, restlessness, irritable, apprehensive)  - Collaborate with interdisciplinary team and initiate plan and interventions as ordered    - [place] patient to unit/surroundings  - Explain treatment plan  - Encourage participation in care  - Encourage verbalization of concerns/fears  - Identify coping mechanisms  - Assist in developing anxiety-reducing skills  - Administer/offer alternative therapies  - Limit or eliminate stimulants  Outcome: Progressing     Problem: DISCHARGE PLANNING - CARE MANAGEMENT  Goal: Discharge to post-acute care or home with appropriate resources  Description  INTERVENTIONS:  - Conduct assessment to determine patient/family and health care team treatment goals, and need for post-acute services based on payer coverage, community resources, and patient preferences, and barriers to discharge  - Address psychosocial, clinical, and financial barriers to discharge as identified in assessment in conjunction with the patient/family and health care team  - Arrange appropriate level of post-acute services according to patients   needs and preference and payer coverage in collaboration with the physician and health care team  - Communicate with and update the patient/family, physician, and health care team regarding progress on the discharge plan  - Arrange appropriate transportation to post-acute venues  Outcome: Progressing     Problem: Prexisting or High Potential for Compromised Skin Integrity  Goal: Skin integrity is maintained or improved  Description  INTERVENTIONS:  - Identify patients at risk for skin breakdown  - Assess and monitor skin integrity  - Assess and monitor nutrition and hydration status  - Monitor labs (i e  albumin)  - Assess for incontinence   - Turn and reposition patient  - Assist with mobility/ambulation  - Relieve pressure over bony prominences  - Avoid friction and shearing  - Provide appropriate hygiene as needed including keeping skin clean and dry  - Evaluate need for skin moisturizer/barrier cream  - Collaborate with interdisciplinary team (i e  Nutrition, Rehabilitation, etc )   - Patient/family teaching  Outcome: Progressing

## 2019-05-26 RX ADMIN — FLUTICASONE FUROATE AND VILANTEROL TRIFENATATE 1 PUFF: 200; 25 POWDER RESPIRATORY (INHALATION) at 07:19

## 2019-05-26 RX ADMIN — THEOPHYLLINE ANHYDROUS 200 MG: 200 CAPSULE, EXTENDED RELEASE ORAL at 08:20

## 2019-05-26 RX ADMIN — PANTOPRAZOLE SODIUM 40 MG: 40 TABLET, DELAYED RELEASE ORAL at 05:51

## 2019-05-26 RX ADMIN — LEVOTHYROXINE SODIUM 125 MCG: 125 TABLET ORAL at 05:51

## 2019-05-26 NOTE — PLAN OF CARE
Problem: Alteration in Thoughts and Perception  Goal: Treatment Goal: Gain control of psychotic behaviors/thinking, reduce/eliminate presenting symptoms and demonstrate improved reality functioning upon discharge  Outcome: Progressing  Goal: Verbalize thoughts and feelings  Description  Interventions:  - Promote a nonjudgmental and trusting relationship with the patient through active listening and therapeutic communication  - Assess patient's level of functioning, behavior and potential for risk  - Engage patient in 1 on 1 interactions for a minimum of 15 minutes each session  - Encourage patient to express fears, feelings, frustrations, and discuss symptoms    - [place] patient to reality, help patient recognize reality-based thinking   - Administer medications as ordered and assess for potential side effects  - Provide the patient education related to the signs and symptoms of the illness and desired effects of prescribed medications  Outcome: Progressing  Goal: Refrain from acting on delusional thinking/internal stimuli  Description  Interventions:  - Monitor patient closely, per order   - Utilize least restrictive measures   - Set reasonable limits, give positive feedback for acceptable   - Administer medications as ordered and monitor of potential side effects  Outcome: Progressing  Goal: Agree to be compliant with medication regime, as prescribed and report medication side effects  Description  Interventions:  - Offer appropriate PRN medication and supervise ingestion; conduct aims, as needed   Outcome: Progressing  Goal: Attend and participate in unit activities, including therapeutic, recreational, and educational groups  Description  Interventions:  - Provide therapeutic and educational activities daily, encourage attendance and participation, and document same in the medical record   Outcome: Progressing  Goal: Recognize dysfunctional thoughts, communicate reality-based thoughts at the time of discharge  Description  Interventions:  - Provide medication and psycho-education to assist patient in compliance and developing insight into his/her illness   Outcome: Progressing  Goal: Complete daily ADLs, including personal hygiene independently, as able  Description  Interventions:  - Observe, teach, and assist patient with ADLS  - Monitor and promote a balance of rest/activity, with adequate nutrition and elimination   Outcome: Progressing     Problem: Ineffective Coping  Goal: Cooperates with admission process  Description  Interventions:   - Complete admission process  Outcome: Progressing  Goal: Identifies ineffective coping skills  Outcome: Progressing  Goal: Identifies healthy coping skills  Outcome: Progressing  Goal: Demonstrates healthy coping skills  Outcome: Progressing  Goal: Participates in unit activities  Description  Interventions:  - Provide therapeutic environment   - Provide required programming   - Redirect inappropriate behaviors   Outcome: Progressing  Goal: Patient/Family participate in treatment and DC plans  Description  Interventions:  - Provide therapeutic environment  Outcome: Progressing  Goal: Patient/Family verbalizes awareness of resources  Outcome: Progressing  Goal: Understands least restrictive measures  Description  Interventions:  - Utilize least restrictive behavior  Outcome: Progressing  Goal: Free from restraint events  Description  - Utilize least restrictive measures   - Provide behavioral interventions   - Redirect inappropriate behaviors   Outcome: Progressing     Problem: Risk for Self Injury/Neglect  Goal: Treatment Goal: Remain safe during length of stay, learn and adopt new coping skills, and be free of self-injurious ideation, impulses and acts at the time of discharge  Outcome: Progressing  Goal: Verbalize thoughts and feelings  Description  Interventions:  - Assess and re-assess patient's lethality and potential for self-injury  - Engage patient in 1:1 interactions, daily, for a minimum of 15 minutes  - Encourage patient to express feelings, fears, frustrations, hopes  - Establish rapport/trust with patient   Outcome: Progressing  Goal: Refrain from harming self  Description  Interventions:  - Monitor patient closely, per order  - Develop a trusting relationship  - Supervise medication ingestion, monitor effects and side effects   Outcome: Progressing  Goal: Attend and participate in unit activities, including therapeutic, recreational, and educational groups  Description  Interventions:  - Provide therapeutic and educational activities daily, encourage attendance and participation, and document same in the medical record  - Obtain collateral information, encourage visitation and family involvement in care   Outcome: Progressing  Goal: Recognize maladaptive responses and adopt new coping mechanisms  Outcome: Progressing  Goal: Complete daily ADLs, including personal hygiene independently, as able  Description  Interventions:  - Observe, teach, and assist patient with ADLS  - Monitor and promote a balance of rest/activity, with adequate nutrition and elimination  Outcome: Progressing     Problem: Depression  Goal: Treatment Goal: Demonstrate behavioral control of depressive symptoms, verbalize feelings of improved mood/affect, and adopt new coping skills prior to discharge  Outcome: Progressing  Goal: Verbalize thoughts and feelings  Description  Interventions:  - Assess and re-assess patient's level of risk   - Engage patient in 1:1 interactions, daily, for a minimum of 15 minutes   - Encourage patient to express feelings, fears, frustrations, hopes   Outcome: Progressing  Goal: Refrain from harming self  Description  Interventions:  - Monitor patient closely, per order   - Supervise medication ingestion, monitor effects and side effects   Outcome: Progressing  Goal: Refrain from isolation  Description  Interventions:  - Develop a trusting relationship   - Encourage socialization   Outcome: Progressing  Goal: Refrain from self-neglect  Outcome: Progressing  Goal: Attend and participate in unit activities, including therapeutic, recreational, and educational groups  Description  Interventions:  - Provide therapeutic and educational activities daily, encourage attendance and participation, and document same in the medical record   Outcome: Progressing  Goal: Complete daily ADLs, including personal hygiene independently, as able  Description  Interventions:  - Observe, teach, and assist patient with ADLS  -  Monitor and promote a balance of rest/activity, with adequate nutrition and elimination   Outcome: Progressing     Problem: Anxiety  Goal: Anxiety is at manageable level  Description  Interventions:  - Assess and monitor patient's anxiety level  - Monitor for signs and symptoms of anxiety both physical and emotional (heart palpitations, chest pain, shortness of breath, headaches, nausea, feeling jumpy, restlessness, irritable, apprehensive)  - Collaborate with interdisciplinary team and initiate plan and interventions as ordered    - [place] patient to unit/surroundings  - Explain treatment plan  - Encourage participation in care  - Encourage verbalization of concerns/fears  - Identify coping mechanisms  - Assist in developing anxiety-reducing skills  - Administer/offer alternative therapies  - Limit or eliminate stimulants  Outcome: Progressing     Problem: DISCHARGE PLANNING - CARE MANAGEMENT  Goal: Discharge to post-acute care or home with appropriate resources  Description  INTERVENTIONS:  - Conduct assessment to determine patient/family and health care team treatment goals, and need for post-acute services based on payer coverage, community resources, and patient preferences, and barriers to discharge  - Address psychosocial, clinical, and financial barriers to discharge as identified in assessment in conjunction with the patient/family and health care team  - Arrange appropriate level of post-acute services according to patients   needs and preference and payer coverage in collaboration with the physician and health care team  - Communicate with and update the patient/family, physician, and health care team regarding progress on the discharge plan  - Arrange appropriate transportation to post-acute venues  Outcome: Progressing     Problem: Prexisting or High Potential for Compromised Skin Integrity  Goal: Skin integrity is maintained or improved  Description  INTERVENTIONS:  - Identify patients at risk for skin breakdown  - Assess and monitor skin integrity  - Assess and monitor nutrition and hydration status  - Monitor labs (i e  albumin)  - Assess for incontinence   - Turn and reposition patient  - Assist with mobility/ambulation  - Relieve pressure over bony prominences  - Avoid friction and shearing  - Provide appropriate hygiene as needed including keeping skin clean and dry  - Evaluate need for skin moisturizer/barrier cream  - Collaborate with interdisciplinary team (i e  Nutrition, Rehabilitation, etc )   - Patient/family teaching  Outcome: Progressing

## 2019-05-26 NOTE — PROGRESS NOTES
Psychiatry Progress Note    Subjective: Interval History     Patient is lying in bed this morning  She states she did sleep but it took her a long time to fall asleep  Patient is rambling during conversation and states that she feels more clear without taking Invega  Patient states that the 6 mg made her feel constipated, depressed, and have a decrease in appetite  Patient then states that it raised her body temperature  Patient states currently her body temperature is "lower and better without Invega " Patient has very poor insight into her mental health  She does not believe she needs medication  Patient is eating her meals  Patient denies feeling depressed or anxious      Behavior over the last 24 hours:  unchanged  Sleep: normal  Appetite: normal  Medication side effects: No  ROS: no complaints    Current medications:    Current Facility-Administered Medications:     acetaminophen (TYLENOL) tablet 650 mg, 650 mg, Oral, Q6H PRN, Kiel Marrero MD    albuterol (PROVENTIL HFA,VENTOLIN HFA) inhaler 2 puff, 2 puff, Inhalation, Q4H PRN, Shilo Hui MD, 2 puff at 05/22/19 0830    aluminum-magnesium hydroxide-simethicone (MYLANTA) 200-200-20 mg/5 mL oral suspension 15 mL, 15 mL, Oral, Q4H PRN, Jill Gu MD, 15 mL at 05/12/19 2221    ammonium lactate (LAC-HYDRIN) 12 % lotion, , Topical, BID PRN, ABILIO Vizcrara, 1 application at 44/63/54 1114    benzonatate (TESSALON PERLES) capsule 100 mg, 100 mg, Oral, TID PRN, Kiel Marrero MD    benztropine (COGENTIN) injection 1 mg, 1 mg, Intramuscular, Q8H PRN, Jill Gu MD    benztropine (COGENTIN) tablet 1 mg, 1 mg, Oral, Q8H PRN, Jill Gu MD    enoxaparin (LOVENOX) subcutaneous injection 40 mg, 40 mg, Subcutaneous, Daily, Kiel Marrero MD    fluticasone-vilanterol (BREO ELLIPTA) 200-25 MCG/INH inhaler 1 puff, 1 puff, Inhalation, Daily, Ross Purcell MD, 1 puff at 05/26/19 0719    haloperidol (HALDOL) oral concentrated solution 1 mg, 1 mg, Oral, Q6H PRN, Forest Reza MD    haloperidol lactate (HALDOL) injection 2 mg, 2 mg, Intramuscular, Q6H PRN, Forest Reza MD    hydrOXYzine HCL (ATARAX) tablet 25 mg, 25 mg, Oral, Q6H PRN, Forest Reza MD    levothyroxine tablet 125 mcg, 125 mcg, Oral, Early Morning, Michelene Kawasaki, MD, 125 mcg at 05/26/19 0551    magnesium hydroxide (MILK OF MAGNESIA) 400 mg/5 mL oral suspension 30 mL, 30 mL, Oral, Daily PRN, Mingo Marroquin MD    paliperidone (INVEGA) 24 hr tablet 9 mg, 9 mg, Oral, Daily, Blake Zambrano MD, 6 mg at 05/24/19 0856    pantoprazole (PROTONIX) EC tablet 40 mg, 40 mg, Oral, Early Morning, Michelene Kawasaki, MD, 40 mg at 05/26/19 0551    polyethylene glycol (MIRALAX) packet 17 g, 17 g, Oral, Daily PRN, ABILIO Cali    risperiDONE (RisperDAL M-TABS) dispersible tablet 1 mg, 1 mg, Oral, Q8H PRN, Forest Reza MD    theophylline (JEF-24) 24 hr capsule 200 mg, 200 mg, Oral, Daily, Michelene Kawasaki, MD, 200 mg at 05/25/19 1489    traZODone (DESYREL) tablet 25 mg, 25 mg, Oral, HS PRN, Michelene Kawasaki, MD    tuberculin injection 5 Units, 5 Units, Intradermal, Once, Blake Zambrano MD, Stopped at 05/22/19 1425    Current Problem List:    Patient Active Problem List   Diagnosis    Sepsis ([facility]    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[facility]    Tobacco use disorder, continuous    Bipolar disorder ([facility]    Left hip pain    Lactic acidosis    Hypokalemia    Hypomagnesemia    Compression fracture of L4 lumbar vertebra    Thoracic compression fracture (HCC)    Ventral hernia    Parapneumonic effusion    Acute on chronic respiratory failure with hypoxia (HCC)    Chronic respiratory failure (HCC)    Hypophosphatemia    Elevated MCV    Compression deformity of vertebra    Schizoaffective disorder, bipolar type (HCC)    Acquired hypothyroidism    Gastroesophageal reflux disease without esophagitis    Abnormal CT of the chest    Excessive cerumen in left ear canal    Lipoma of right upper extremity    Polydipsia    Localized swelling of both lower legs       Problem list reviewed 05/26/19     Objective:     Vital Signs:  Vitals:    05/25/19 0702 05/25/19 1445 05/25/19 2100 05/25/19 2202   BP: 100/58 121/67 170/65 117/61   BP Location: Left arm Left arm Right arm Left arm   Pulse: 83 98 84 78   Resp: 20 16 18 20   Temp: 97 8 °F (36 6 °C) 98 6 °F (37 °C) 98 4 °F (36 9 °C) 98 °F (36 7 °C)   TempSrc: Temporal Temporal Temporal Temporal   SpO2: 98% 98% 97% 98%   Weight:       Height:             Appearance:  age appropriate, casually dressed and disheveled   Behavior:  intense   Speech:  normal volume   Mood:  irritable   Affect:  increased in intensity   Thought Process:  tangential   Thought Content:  delusions  persecutory   Perceptual Disturbances: None   Risk Potential: none   Sensorium:  person, place, situation and time   Cognition:  intact   Consciousness:  alert and awake    Attention: attention span and concentration were age appropriate   Intellect: average   Insight:  poor   Judgment: poor      Motor Activity: no abnormal movements       Assessment / Plan:     [facility]ffective disorder, [facility]    Recommended Treatment:      Medication changes:  1) continue current medication regimen  Non-pharmacological treatments  1) Continue with group therapy, milieu therapy and occupational therapy  Safety  1) Safety/communication plan established targeting dynamic risk factors above  2) Risks, benefits, and possible side effects of medications explained to patient and patient verbalizes understanding  Counseling / Coordination of Care    Total floor / unit time spent today 20 minutes  Greater than 50% of total time was spent with the patient and / or family counseling and / or coordination of care  A description of the counseling / coordination of care       Patient's Rights, confidentiality and exceptions to confidentiality, use of automated medical record, [employer] staff access to medical record, and consent to treatment reviewed      Gary Luis PA-C

## 2019-05-26 NOTE — NURSING NOTE
Patient resting in bed and appears to be sleeping  O2 nasal cannula placed on 3 L as ordered   Will continue to monitor per protocol

## 2019-05-26 NOTE — NURSING NOTE
Patient refused her Lovenox and Invega this morning  [facility] notified  Patient also refused to get OOB for lunch stating she had a full breakfast and was not hungry  Patient did eat 100% of breakfast without difficulty  Patient spoke with [facility] regarding her Sunny Chang and the way it makes her feel  No new orders noted at this time  Patient has multiple concerns regarding her Psychiatrist and wanted to know if she could change doctors  Patient referred to speak with  in the AM regarding this matter  Patient agreed to this plan  No complaints of pain or discomfort and patient is cooperative with staff  Patient does interact with several peers without difficulty but she also isolates to her room in between meals and through lunch time today  Patient encouraged to come out of room for her drinks if she had no appetite but she continued to refuse  Patient remains of Aspiration and Fall precautions at this time  Patient with no s/s of aspiration noted and gait remains steady with staff supervision for ambulation from her room to the dayroom  No new issues noted at this time

## 2019-05-26 NOTE — NURSING NOTE
Patient isolative to self in bed  She came out of her room after encouragements to eat snacks  No meds ordered for bed time  Patient denies pain  No complaints of SOB  Lungs diminished at based,  no wheezes on auscultation  No further complaints   Will continue to monitor per protocol

## 2019-05-27 PROCEDURE — 99232 SBSQ HOSP IP/OBS MODERATE 35: CPT | Performed by: PHYSICIAN ASSISTANT

## 2019-05-27 RX ADMIN — FLUTICASONE FUROATE AND VILANTEROL TRIFENATATE 1 PUFF: 200; 25 POWDER RESPIRATORY (INHALATION) at 07:53

## 2019-05-27 RX ADMIN — THEOPHYLLINE ANHYDROUS 200 MG: 200 CAPSULE, EXTENDED RELEASE ORAL at 08:58

## 2019-05-27 RX ADMIN — PANTOPRAZOLE SODIUM 40 MG: 40 TABLET, DELAYED RELEASE ORAL at 06:24

## 2019-05-27 RX ADMIN — LEVOTHYROXINE SODIUM 125 MCG: 125 TABLET ORAL at 06:24

## 2019-05-27 NOTE — NURSING NOTE
Patient is complaint with medications and meals  Appetite was good for breakfast with patient eating 100% of same  No complaints of pain or discomfort  Patient was visible on the unit for a little while after breakfast but then wanted to go to her room to wash up and then stayed in bed till lunch time  Patient encouraged to stay out of room more often  Patient remains cooperative with staff and reports no depressive or anxiety symptoms  Patient refused Invega and Lovenox despite education on both medications  PA for Psychiatry notified of same  No new orders noted at this time

## 2019-05-27 NOTE — PROGRESS NOTES
Progress Note - Behavioral Health   Hertford Goes 66 y o  female MRN: [id number]  Unit/Bed#: Sarah Botello 617-99 Encounter: [id number]    Assessment  Active Problems:Schizoaffective disorder, bipolar type [facility])  Patient Active Problem List   Diagnosis    Sepsis ([facility] [address] )    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[facility] )    Tobacco use disorder, continuous    Bipolar disorder ([facility] )    Left hip pain    Lactic acidosis    Hypokalemia    Hypomagnesemia    Compression fracture of L4 lumbar vertebra    Thoracic compression fracture (HCC)    Ventral hernia    Parapneumonic effusion    Acute on chronic respiratory failure with hypoxia (HCC)    Chronic respiratory failure (HCC)    Hypophosphatemia    Elevated MCV    Compression deformity of vertebra    Schizoaffective disorder, bipolar type (HCC)    Acquired hypothyroidism    Gastroesophageal reflux disease without esophagitis    Abnormal CT of the chest    Excessive cerumen in left ear canal    Lipoma of right upper extremity    Polydipsia    Localized swelling of both lower legs       Vitals:    05/27/19 1524   BP: 97/54   Pulse: 72   Resp: 18   Temp: 98 4 °F (36 9 °C)   SpO2: 95%     CC: "I don't need medications "    Behavior over the last 24 hours:  unchanged  Sleep: normal  Appetite: normal  Medication side effects: No  ROS: no complaints    Mental Status Evaluation:  Appearance:  age appropriate   Behavior:  not medication compliant   Speech:  normal pitch and normal volume   Mood:  anxious   Affect:  labile   Thought Process:  tangential   Thought Content:  paranoid delusions   Perceptual Disturbances: none reported or observed   Potential for Aggression:    Suicidal/Homicidal Ideation: Not when seen    No SI or HI   Sensorium:  person, place, time/date and situation   Cognition:  grossly intact   Consciousness:  alert and sedated    Attention: attention span appeared shorter than expected for age   Insight:  poor   Judgment: poor   Gait/Station: patient seen seated   Motor Activity: no abnormal movements   Associations: somewhat loose  Memory: grossly intact    Progress Toward Goals and Additional Assessment: Patient still is refusing medications and still harbors paranoid delusions toward family members  Patient reports that she will not take medications again because she didn't like the way she felt on them  Denies hallucinations, still is on 3 liters of O2  Refusing TB test as well  Lacks insight into her situation  Recommended Treatment:   1) Continue with group therapy, milieu therapy and individual therapy  2) Continue current treatment plan    Risks, benefits and possible side effects of Medications:   Risks, benefits, and possible side effects of medications explained to patient and patient verbalizes understanding        Medications:   all current active meds have been reviewed and current meds:   Current Facility-Administered Medications   Medication Dose Route Frequency    acetaminophen (TYLENOL) tablet 650 mg  650 mg Oral Q6H PRN    albuterol (PROVENTIL HFA,VENTOLIN HFA) inhaler 2 puff  2 puff Inhalation Q4H PRN    aluminum-magnesium hydroxide-simethicone (MYLANTA) 200-200-20 mg/5 mL oral suspension 15 mL  15 mL Oral Q4H PRN    ammonium lactate (LAC-HYDRIN) 12 % lotion   Topical BID PRN    benzonatate (TESSALON PERLES) capsule 100 mg  100 mg Oral TID PRN    benztropine (COGENTIN) injection 1 mg  1 mg Intramuscular Q8H PRN    benztropine (COGENTIN) tablet 1 mg  1 mg Oral Q8H PRN    enoxaparin (LOVENOX) subcutaneous injection 40 mg  40 mg Subcutaneous Daily    fluticasone-vilanterol (BREO ELLIPTA) 200-25 MCG/INH inhaler 1 puff  1 puff Inhalation Daily    haloperidol (HALDOL) oral concentrated solution 1 mg  1 mg Oral Q6H PRN    haloperidol lactate (HALDOL) injection 2 mg  2 mg Intramuscular Q6H PRN    hydrOXYzine HCL (ATARAX) tablet 25 mg  25 mg Oral Q6H PRN    levothyroxine tablet 125 mcg  125 mcg Oral Early Morning    magnesium hydroxide (MILK OF MAGNESIA) 400 mg/5 mL oral suspension 30 mL  30 mL Oral Daily PRN    paliperidone (INVEGA) 24 hr tablet 9 mg  9 mg Oral Daily    pantoprazole (PROTONIX) EC tablet 40 mg  40 mg Oral Early Morning    polyethylene glycol (MIRALAX) packet 17 g  17 g Oral Daily PRN    risperiDONE (RisperDAL M-TABS) dispersible tablet 1 mg  1 mg Oral Q8H PRN    theophylline (JEF-24) 24 hr capsule 200 mg  200 mg Oral Daily    traZODone (DESYREL) tablet 25 mg  25 mg Oral HS PRN    tuberculin injection 5 Units  5 Units Intradermal Once     Labs:   Admission on 05/05/2019   Component Date Value    POC Glucose 05/10/2019 156*       Is billing to be determined based on time spent on case? No     Vickie Mancini PA-C

## 2019-05-27 NOTE — NURSING NOTE
Patient resting in bed and appears to be sleeping  Nasal cannula in place 3 L  Respiration even, unlabored  No signs of distress   Will continue to monitor per protocol

## 2019-05-27 NOTE — PROGRESS NOTES
Patient is visible on the milieu  Is pleasant and cooperative  Interacts well with peers  Denies SI/HI or hallucinations  Reports mood and appetite  Improvement  Patient is on continuous oxygen 3L with no respiratory distress noted this time  Will continue to monitor for safety and support

## 2019-05-27 NOTE — PLAN OF CARE
Problem: Alteration in Thoughts and Perception  Goal: Treatment Goal: Gain control of psychotic behaviors/thinking, reduce/eliminate presenting symptoms and demonstrate improved reality functioning upon discharge  Outcome: Progressing  Goal: Verbalize thoughts and feelings  Description  Interventions:  - Promote a nonjudgmental and trusting relationship with the patient through active listening and therapeutic communication  - Assess patient's level of functioning, behavior and potential for risk  - Engage patient in 1 on 1 interactions for a minimum of 15 minutes each session  - Encourage patient to express fears, feelings, frustrations, and discuss symptoms    - [place] patient to reality, help patient recognize reality-based thinking   - Administer medications as ordered and assess for potential side effects  - Provide the patient education related to the signs and symptoms of the illness and desired effects of prescribed medications  Outcome: Progressing  Goal: Refrain from acting on delusional thinking/internal stimuli  Description  Interventions:  - Monitor patient closely, per order   - Utilize least restrictive measures   - Set reasonable limits, give positive feedback for acceptable   - Administer medications as ordered and monitor of potential side effects  Outcome: Progressing  Goal: Agree to be compliant with medication regime, as prescribed and report medication side effects  Description  Interventions:  - Offer appropriate PRN medication and supervise ingestion; conduct aims, as needed   Outcome: Progressing  Goal: Recognize dysfunctional thoughts, communicate reality-based thoughts at the time of discharge  Description  Interventions:  - Provide medication and psycho-education to assist patient in compliance and developing insight into his/her illness   Outcome: Progressing  Goal: Complete daily ADLs, including personal hygiene independently, as able  Description  Interventions:  - Observe, teach, and assist patient with ADLS  - Monitor and promote a balance of rest/activity, with adequate nutrition and elimination   Outcome: Progressing     Problem: Risk for Self Injury/Neglect  Goal: Treatment Goal: Remain safe during length of stay, learn and adopt new coping skills, and be free of self-injurious ideation, impulses and acts at the time of discharge  Outcome: Progressing  Goal: Verbalize thoughts and feelings  Description  Interventions:  - Assess and re-assess patient's lethality and potential for self-injury  - Engage patient in 1:1 interactions, daily, for a minimum of 15 minutes  - Encourage patient to express feelings, fears, frustrations, hopes  - Establish rapport/trust with patient   Outcome: Progressing  Goal: Refrain from harming self  Description  Interventions:  - Monitor patient closely, per order  - Develop a trusting relationship  - Supervise medication ingestion, monitor effects and side effects   Outcome: Progressing  Goal: Recognize maladaptive responses and adopt new coping mechanisms  Outcome: Progressing  Goal: Complete daily ADLs, including personal hygiene independently, as able  Description  Interventions:  - Observe, teach, and assist patient with ADLS  - Monitor and promote a balance of rest/activity, with adequate nutrition and elimination  Outcome: Progressing     Problem: Depression  Goal: Treatment Goal: Demonstrate behavioral control of depressive symptoms, verbalize feelings of improved mood/affect, and adopt new coping skills prior to discharge  Outcome: Progressing  Goal: Verbalize thoughts and feelings  Description  Interventions:  - Assess and re-assess patient's level of risk   - Engage patient in 1:1 interactions, daily, for a minimum of 15 minutes   - Encourage patient to express feelings, fears, frustrations, hopes   Outcome: Progressing  Goal: Refrain from harming self  Description  Interventions:  - Monitor patient closely, per order   - Supervise medication ingestion, monitor effects and side effects   Outcome: Progressing  Goal: Refrain from isolation  Description  Interventions:  - Develop a trusting relationship   - Encourage socialization   Outcome: Progressing  Goal: Refrain from self-neglect  Outcome: Progressing  Goal: Complete daily ADLs, including personal hygiene independently, as able  Description  Interventions:  - Observe, teach, and assist patient with ADLS  -  Monitor and promote a balance of rest/activity, with adequate nutrition and elimination   Outcome: Progressing     Problem: Anxiety  Goal: Anxiety is at manageable level  Description  Interventions:  - Assess and monitor patient's anxiety level  - Monitor for signs and symptoms of anxiety both physical and emotional (heart palpitations, chest pain, shortness of breath, headaches, nausea, feeling jumpy, restlessness, irritable, apprehensive)  - Collaborate with interdisciplinary team and initiate plan and interventions as ordered    - [place] patient to unit/surroundings  - Explain treatment plan  - Encourage participation in care  - Encourage verbalization of concerns/fears  - Identify coping mechanisms  - Assist in developing anxiety-reducing skills  - Administer/offer alternative therapies  - Limit or eliminate stimulants  Outcome: Progressing     Problem: Prexisting or High Potential for Compromised Skin Integrity  Goal: Skin integrity is maintained or improved  Description  INTERVENTIONS:  - Identify patients at risk for skin breakdown  - Assess and monitor skin integrity  - Assess and monitor nutrition and hydration status  - Monitor labs (i e  albumin)  - Assess for incontinence   - Turn and reposition patient  - Assist with mobility/ambulation  - Relieve pressure over bony prominences  - Avoid friction and shearing  - Provide appropriate hygiene as needed including keeping skin clean and dry  - Evaluate need for skin moisturizer/barrier cream  - Collaborate with interdisciplinary team (i e  Nutrition, Rehabilitation, etc )   - Patient/family teaching  Outcome: Progressing

## 2019-05-28 PROCEDURE — 99232 SBSQ HOSP IP/OBS MODERATE 35: CPT | Performed by: PSYCHIATRY & NEUROLOGY

## 2019-05-28 RX ORDER — HALOPERIDOL 5 MG/ML
2.5 INJECTION INTRAMUSCULAR DAILY
Status: DISCONTINUED | OUTPATIENT
Start: 2019-05-28 | End: 2019-05-30

## 2019-05-28 RX ORDER — HALOPERIDOL 5 MG
5 TABLET ORAL DAILY
Status: DISCONTINUED | OUTPATIENT
Start: 2019-05-28 | End: 2019-05-30

## 2019-05-28 RX ADMIN — HALOPERIDOL 5 MG: 5 TABLET ORAL at 17:33

## 2019-05-28 RX ADMIN — PANTOPRAZOLE SODIUM 40 MG: 40 TABLET, DELAYED RELEASE ORAL at 05:39

## 2019-05-28 RX ADMIN — LEVOTHYROXINE SODIUM 125 MCG: 125 TABLET ORAL at 05:39

## 2019-05-28 RX ADMIN — FLUTICASONE FUROATE AND VILANTEROL TRIFENATATE 1 PUFF: 200; 25 POWDER RESPIRATORY (INHALATION) at 08:29

## 2019-05-28 RX ADMIN — THEOPHYLLINE ANHYDROUS 200 MG: 200 CAPSULE, EXTENDED RELEASE ORAL at 08:29

## 2019-05-28 NOTE — PROGRESS NOTES
[facility]  Psychiatrists  Progress Note    Patient Name: Joya Thorne  MRN: [id number]  DOS: 05/28/19    Chief Complaint:  paranoid delusions    Interval History: Patient continues to be paranoid, illogical, and has poor insight  Remains noncompliant with medications  Patient claims she does not need any medications because she does not have "bipolar disorder " When writer suggested haldol, she states that's for "people who hear voices " We discussed medication over objection and she argued "I'm a law abiding citizen" and for this reason she should not be medicated  Medication compliance is fair with significant encouragement    Adverse effects of medications: denies    Mental Status Exam [Per above +]  Appearance: minimal grooming/hygiene; no abnormal involuntary movements noted  Behavior: calm, oppositional  Speech: wnl  Mood: reported as euthymic  Affect: neutral, constricted  Thought process: illogical, at times irrelevant  Thought content: idiosyncratic delusions, paranoid; denies SI/HI  Perceptual disturbances: denies AH/VH  Cognition: oriented to all domains     Insight: poor  Judgement: poor      Current Facility-Administered Medications:     acetaminophen (TYLENOL) tablet 650 mg, 650 mg, Oral, Q6H PRN, Reji Duque MD    albuterol (PROVENTIL HFA,VENTOLIN HFA) inhaler 2 puff, 2 puff, Inhalation, Q4H PRN, Redd Henson MD, 2 puff at 05/22/19 0830    aluminum-magnesium hydroxide-simethicone (MYLANTA) 200-200-20 mg/5 mL oral suspension 15 mL, 15 mL, Oral, Q4H PRN, Jarrett Espinoza MD, 15 mL at 05/12/19 2221    ammonium lactate (LAC-HYDRIN) 12 % lotion, , Topical, BID PRN, ABILIO Field, 1 application at 50/56/32 1114    benzonatate (TESSALON PERLES) capsule 100 mg, 100 mg, Oral, TID PRN, Reji Duque MD    benztropine (COGENTIN) injection 1 mg, 1 mg, Intramuscular, Q8H PRN, Jarrett Espinoza MD  [facility] benztropine (COGENTIN) tablet 1 mg, 1 mg, Oral, Q8H PRN, Virginia Rivera MD    enoxaparin (LOVENOX) subcutaneous injection 40 mg, 40 mg, Subcutaneous, Daily, Mandie Anderson MD    fluticasone-vilanterol (BREO ELLIPTA) 200-25 MCG/INH inhaler 1 puff, 1 puff, Inhalation, Daily, Steven Long MD, 1 puff at 05/28/19 0829    haloperidol (HALDOL) oral concentrated solution 1 mg, 1 mg, Oral, Q6H PRN, Virginia Rivera MD    haloperidol (HALDOL) tablet 5 mg, 5 mg, Oral, Daily **OR** haloperidol lactate (HALDOL) injection 2 5 mg, 2 5 mg, Intramuscular, Daily, Elida Pa MD    haloperidol lactate (HALDOL) injection 2 mg, 2 mg, Intramuscular, Q6H PRN, Virginia Rivera MD    hydrOXYzine HCL (ATARAX) tablet 25 mg, 25 mg, Oral, Q6H PRN, Virginia Rivera MD    levothyroxine tablet 125 mcg, 125 mcg, Oral, Early Morning, Mandie Anderson MD, 125 mcg at 05/28/19 0539    magnesium hydroxide (MILK OF MAGNESIA) 400 mg/5 mL oral suspension 30 mL, 30 mL, Oral, Daily PRN, Maco Doan MD    pantoprazole (PROTONIX) EC tablet 40 mg, 40 mg, Oral, Early Morning, Mandie Anderson MD, 40 mg at 05/28/19 0539    polyethylene glycol (MIRALAX) packet 17 g, 17 g, Oral, Daily PRN, ABILIO Hayward    risperiDONE (RisperDAL M-TABS) dispersible tablet 1 mg, 1 mg, Oral, Q8H PRN, Virginia Rivera MD    theophylline (JEF-24) 24 hr capsule 200 mg, 200 mg, Oral, Daily, Mandie Anderson MD, 200 mg at 05/28/19 2278    traZODone (DESYREL) tablet 25 mg, 25 mg, Oral, HS PRN, Mandie Anderson MD    tuberculin injection 5 Units, 5 Units, Intradermal, Once, Elida Pa MD, Stopped at 05/22/19 1425    Vitals:    05/28/19 1537   BP: 105/55   Pulse: 75   Resp: 16   Temp: 97 6 °F (36 4 °C)   SpO2: 96%       Lab/work up: no new labs    Assessment:     Axis I:  · Schizoaffective disorder; has been treatment resistant  Axis II:  · OCPD  · Cluster B traits  Axis III:  - recovering from sepsis 2/2 CAP, COPD with asthma, stable compression fracture of L4   Axis IV:  · Limited social support, chronically homeless, financial problems, on disability, noncompliant with treatment    Progress: limited    Plan:   1  Disposition: Patient meets criteria for ongoing acute inpatient treatment due to being danger to self 2/2 psychosis and poor self care  Patient will be discharged when there is an absence of psychosis [id number]  2  Legal status: 304  3  Psychopharmacologic interventions:  - discontinue paliperidone 9mg po daily   - start haldol 5mg po daily - can give IM 2 5mg po daily instead if patient refuses  - Continue to monitor psychosis  4  Medical comorbidities: Hospitalist following PRN; refused PPD  5  Other therapies: Individual/group/milieu therapy as appropriate   6  Social issues: Case management following to arrange disposition - will pursue state hospitalization  7   Precautions: Routine q15min checks, vitals qshift    Lexi Andrew MD  05/28/19

## 2019-05-28 NOTE — PROGRESS NOTES
Pt continues on Aspiration and fall precautions as per ordered  Pt continues with O2 3L cont via nasal cannula with no concerns  Pt pleasant and cooperative with care this evening  OOB for meals, snack and phone calls  Isolative to room at times  VSS  Denies SI/HI at this time  Appears depressed, denies anxiety

## 2019-05-28 NOTE — PROGRESS NOTES
Monitored on Q 7 minute safety checks, on fall and aspiration precautions  On 02 at 3L via nasal cannula  In no distress  Not voicing any SI's   Currently in bed with eyes closed and respirations noted  Pulse ox 94 %   Appears to be resting calmly

## 2019-05-28 NOTE — PROGRESS NOTES
Patient is visible int the day room  New orders for haldol explained to the patient  Patient is paranoid and suspicious and refusing haldol medication  She states she "is [place] citizen and can refuse medications  I am not bipolar or addicted to controlled substances "  Patient continued to avoid taking medication with conversation  Patient given option for po or injection  Patient eventually took the medication orally  Mouth was checked  Will continue to monitor on q 7 minute checks  Vitals are stable  Patient continues on 02

## 2019-05-28 NOTE — CASE MANAGEMENT
[facility] referral submitted to Dandre Nance at [facility]   and MD met with patient, patient does not believe she needs medication  Writer and MD educated patient on need for medication and the issue of her refusing medications  Patient verbalized understanding that if she refuses the PO of the medication she will receive an injection  CM will continue to follow and provide services as needed

## 2019-05-28 NOTE — PROGRESS NOTES
Pt anxious with poor insight  Refused invega and lovenox  Good appetite, ambulates with supervision  VSS  Pox 94% with O2 at 2 liters/min via nc  Breath sounds diminished  Pt did not attend group, isolative to room  Denied SI  Monitored for safety and support

## 2019-05-29 LAB
BACTERIA UR QL AUTO: ABNORMAL /HPF
BILIRUB UR QL STRIP: NEGATIVE
CLARITY UR: CLEAR
COLOR UR: ABNORMAL
GLUCOSE UR STRIP-MCNC: NEGATIVE MG/DL
HGB UR QL STRIP.AUTO: NEGATIVE
KETONES UR STRIP-MCNC: NEGATIVE MG/DL
LEUKOCYTE ESTERASE UR QL STRIP: NEGATIVE
NITRITE UR QL STRIP: NEGATIVE
NON-SQ EPI CELLS URNS QL MICRO: ABNORMAL /HPF
PH UR STRIP.AUTO: 8 [PH]
PROT UR STRIP-MCNC: NEGATIVE MG/DL
RBC #/AREA URNS AUTO: ABNORMAL /HPF
SP GR UR STRIP.AUTO: 1.01 (ref 1–1.04)
UROBILINOGEN UA: NEGATIVE MG/DL
WBC #/AREA URNS AUTO: ABNORMAL /HPF

## 2019-05-29 PROCEDURE — 81001 URINALYSIS AUTO W/SCOPE: CPT | Performed by: NURSE PRACTITIONER

## 2019-05-29 PROCEDURE — 99232 SBSQ HOSP IP/OBS MODERATE 35: CPT | Performed by: NURSE PRACTITIONER

## 2019-05-29 PROCEDURE — 97150 GROUP THERAPEUTIC PROCEDURES: CPT

## 2019-05-29 RX ADMIN — HALOPERIDOL 5 MG: 5 TABLET ORAL at 09:33

## 2019-05-29 RX ADMIN — HYDROXYZINE HYDROCHLORIDE 25 MG: 25 TABLET ORAL at 13:26

## 2019-05-29 RX ADMIN — FLUTICASONE FUROATE AND VILANTEROL TRIFENATATE 1 PUFF: 200; 25 POWDER RESPIRATORY (INHALATION) at 09:38

## 2019-05-29 RX ADMIN — LEVOTHYROXINE SODIUM 125 MCG: 125 TABLET ORAL at 06:38

## 2019-05-29 RX ADMIN — THEOPHYLLINE ANHYDROUS 200 MG: 200 CAPSULE, EXTENDED RELEASE ORAL at 09:33

## 2019-05-29 RX ADMIN — PANTOPRAZOLE SODIUM 40 MG: 40 TABLET, DELAYED RELEASE ORAL at 06:38

## 2019-05-29 NOTE — PROGRESS NOTES
Awake, alert, oriented, poor insight  Paranoid, delusional, somatic  Accepted PO Haldol this morning as ordered with some resistance  Isolative to room  Will continue to monitor

## 2019-05-29 NOTE — PROGRESS NOTES
Pt yelling from room  Remains somatic, states "I'm dying"  Pt ambulated to bathroom independently then yelled and rang for assistance to ambulate back to bed  Pt c/o "not feeling stable"  Ambulated with bed with standby assist and steady gait  Vital signs stable   /88 (05/29/19 1245)    Temp 97 9 °F (36 6 °C) (05/29/19 1245)    Pulse 88 (05/29/19 1245)   Resp 20 (05/29/19 1245)    SpO2 94 % (05/29/19 1245)      Will continue to monitor

## 2019-05-29 NOTE — OCCUPATIONAL THERAPY NOTE
Occupational Therapy Group Treatment Note      Robby Trujillo    5/29/2019    Patient Active Problem List   Diagnosis    Sepsis [facility] [address] [facility]    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[facility] [address] [facility]    Tobacco use disorder, continuous    Bipolar disorder ([facility] [address] [facility]    Left hip pain    Lactic acidosis    Hypokalemia    Hypomagnesemia    Compression fracture of L4 lumbar vertebra    Thoracic compression fracture (HCC)    Ventral hernia    Parapneumonic effusion    Acute on chronic respiratory failure with hypoxia (HCC)    Chronic respiratory failure (HCC)    Hypophosphatemia    Elevated MCV    Compression deformity of vertebra    Schizoaffective disorder, bipolar type ([facility] [address] [facility]    Acquired hypothyroidism    Gastroesophageal reflux disease without esophagitis    Abnormal CT of the chest    Excessive cerumen in left ear canal    Lipoma of right upper extremity    Polydipsia    Localized swelling of both lower legs       Past Medical History:   Diagnosis Date    Acid reflux     Anxiety     Asthma     Bipolar 1 disorder (HCC)     Chronic pain disorder     Chronic respiratory failure (HCC)     Compression fracture of fourth lumbar vertebra (HCC)     COPD (chronic obstructive pulmonary disease) (HCC)     Depression     GERD (gastroesophageal reflux disease)     History of home oxygen therapy     Hypothyroidism     Lipoma of upper extremity     Psychiatric illness     Schizoaffective disorder ([facility] [address] [facility]     Substance abuse ([facility] [address] [facility]     Nicotine    Thoracic compression fracture ([facility] [address] [facility]     Ventral hernia        No past surgical history on file  05/29/19 1020   Assessment   Assessment Patsy was present for the first half of this morning OT Life Management program  She was alert, attentive to group discussion  She was provided with handouts on grounding strategies for management of anxiety  She did freely discuss topics in a calm give and take manner with the group  She was pleasant, her comments were relevant   She did leave the group early to attend to personal care  She did not return to the group room  When in group, her motivation has been good, but her attendance to program has not been consistent event when encouraged to attend  Continue to promote positive group investment and reality based focus  Meri Patsy's positive interactions with others  Plan   Treatment Interventions ADL retraining; Endurance training;Continued evaluation; Activityengagement  (coping skills instruction, life management instruction)   Goal Expiration Date 06/06/19   Treatment Day 23   OT Frequency 5x/wk   Kristian Jones, OT

## 2019-05-29 NOTE — PLAN OF CARE
Problem: Alteration in Thoughts and Perception  Goal: Agree to be compliant with medication regime, as prescribed and report medication side effects  Description  Interventions:  - Offer appropriate PRN medication and supervise ingestion; conduct aims, as needed   Outcome: Not Progressing  Goal: Complete daily ADLs, including personal hygiene independently, as able  Description  Interventions:  - Observe, teach, and assist patient with ADLS  - Monitor and promote a balance of rest/activity, with adequate nutrition and elimination   Outcome: Not Progressing     Problem: Ineffective Coping  Goal: Participates in unit activities  Description  Interventions:  - Provide therapeutic environment   - Provide required programming   - Redirect inappropriate behaviors   Outcome: Not Progressing     Problem: Risk for Self Injury/Neglect  Goal: Verbalize thoughts and feelings  Description  Interventions:  - Assess and re-assess patient's lethality and potential for self-injury  - Engage patient in 1:1 interactions, daily, for a minimum of 15 minutes  - Encourage patient to express feelings, fears, frustrations, hopes  - Establish rapport/trust with patient   Outcome: Not Progressing  Goal: Refrain from harming self  Description  Interventions:  - Monitor patient closely, per order  - Develop a trusting relationship  - Supervise medication ingestion, monitor effects and side effects   Outcome: Not Progressing  Goal: Attend and participate in unit activities, including therapeutic, recreational, and educational groups  Description  Interventions:  - Provide therapeutic and educational activities daily, encourage attendance and participation, and document same in the medical record  - Obtain collateral information, encourage visitation and family involvement in care   Outcome: Not Progressing  Goal: Recognize maladaptive responses and adopt new coping mechanisms  Outcome: Not Progressing  Goal: Complete daily ADLs, including personal hygiene independently, as able  Description  Interventions:  - Observe, teach, and assist patient with ADLS  - Monitor and promote a balance of rest/activity, with adequate nutrition and elimination  Outcome: Not Progressing     Problem: Depression  Goal: Treatment Goal: Demonstrate behavioral control of depressive symptoms, verbalize feelings of improved mood/affect, and adopt new coping skills prior to discharge  Outcome: Not Progressing  Goal: Verbalize thoughts and feelings  Description  Interventions:  - Assess and re-assess patient's level of risk   - Engage patient in 1:1 interactions, daily, for a minimum of 15 minutes   - Encourage patient to express feelings, fears, frustrations, hopes   Outcome: Not Progressing  Goal: Refrain from harming self  Description  Interventions:  - Monitor patient closely, per order   - Supervise medication ingestion, monitor effects and side effects   Outcome: Not Progressing  Goal: Refrain from isolation  Description  Interventions:  - Develop a trusting relationship   - Encourage socialization   Outcome: Not Progressing  Goal: Refrain from self-neglect  Outcome: Not Progressing  Goal: Attend and participate in unit activities, including therapeutic, recreational, and educational groups  Description  Interventions:  - Provide therapeutic and educational activities daily, encourage attendance and participation, and document same in the medical record   Outcome: Not Progressing  Goal: Complete daily ADLs, including personal hygiene independently, as able  Description  Interventions:  - Observe, teach, and assist patient with ADLS  -  Monitor and promote a balance of rest/activity, with adequate nutrition and elimination   Outcome: Not Progressing     Problem: Anxiety  Goal: Anxiety is at manageable level  Description  Interventions:  - Assess and monitor patient's anxiety level     - Monitor for signs and symptoms of anxiety both physical and emotional (heart palpitations, chest pain, shortness of breath, headaches, nausea, feeling jumpy, restlessness, irritable, apprehensive)  - Collaborate with interdisciplinary team and initiate plan and interventions as ordered    - [place] patient to unit/surroundings  - Explain treatment plan  - Encourage participation in care  - Encourage verbalization of concerns/fears  - Identify coping mechanisms  - Assist in developing anxiety-reducing skills  - Administer/offer alternative therapies  - Limit or eliminate stimulants  Outcome: Not Progressing

## 2019-05-29 NOTE — PROGRESS NOTES
Progress Note - Behavioral Health   Amberly Lowe 66 y o  female MRN: [id number]  Unit/Bed#: [facility] 357-73 Encounter: [id number]    The patient was seen for continuing care and reviewed with treatment team  Staff reports that the patient remains cooperative and compliant with medications with encouragement  Continues to be visible on the milieu, attends select groups, but socially selective with peers  During assessment the patient continues to present with disorganized thinking, requires specific demands and needs, and needs increased support for somatic complaints  Denies any thoughts to hurt herself or others  Denies any auditory or visual hallucinations  Remains delusional and paranoid in thought   Patient claims, "she does not have a definitive diagnosis and is not crazy enough for medications "    Mental Status Evaluation:  Appearance:  Marginal/poor hygiene   Behavior:  cooperative, guarded and hostile; can be oppositional   Fund of knowledge  aware of current events and Aware of past history   Speech:   Language: Normal rate and Normal volume  No overt abnormality   Mood:  improving   Affect:   Associations: blunted  Intact   Thought Process:  Circumstantial, Tangential and disorganized   Thought Content:  Paranoid and mistrustful and Delusions of persecution   Perceptual Disturbances: Denies hallucinations and does not appear to be responding to internal stimuli   Risk Potential: No suicidal or homicidal ideation   Orientation  Oriented x 3   Memory No deficits   Attention/Concentration [place] than expected   Insight:  No insight   Judgment: Poor judgment   Gait/Station: Not observed   Motor Activity: No abnormal movement noted     Progress Toward Goals: improving    Assessment/Plan    Principal Problem:    Schizoaffective disorder, bipolar type ([facility] )  Active Problems:    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[facility] )    Acquired hypothyroidism    Gastroesophageal reflux disease without esophagitis      Recommended Treatment: Continue with pharmacotherapy, group therapy, milieu therapy and occupational therapy  The patient will be maintained on the following medications:    Current Facility-Administered Medications:  acetaminophen 650 mg Oral Q6H PRN Britta Ibarra MD   albuterol 2 puff Inhalation Q4H PRN David Nascimento MD   aluminum-magnesium hydroxide-simethicone 15 mL Oral Q4H PRN Damaris Correa MD   ammonium lactate  Topical BID PRN ABILIO [place]   benzonatate 100 mg Oral TID PRN Britta Ibarra MD   benztropine 1 mg Intramuscular Q8H PRN Damaris Correa MD   benztropine 1 mg Oral Q8H PRN Damaris Correa MD   enoxaparin 40 mg Subcutaneous Daily Britta Ibarra MD   fluticasone-vilanterol 1 puff Inhalation Daily Dawna Pearce MD   haloperidol 1 mg Oral Q6H PRN Damaris Correa MD   haloperidol 5 mg Oral Daily Brenda Corado MD   Or       haloperidol lactate 2 5 mg Intramuscular Daily Brenda Corado MD   haloperidol lactate 2 mg Intramuscular Q6H PRN Damaris Correa MD   hydrOXYzine HCL 25 mg Oral Q6H PRN Damaris Correa MD   levothyroxine 125 mcg Oral Early Morning Britta Ibarra MD   magnesium hydroxide 30 mL Oral Daily PRN Ladena Leventhal, MD   pantoprazole 40 mg Oral Early Morning Britta Ibarra MD   polyethylene glycol 17 g Oral Daily PRN ABILIO [place]   risperiDONE 1 mg Oral Q8H PRN Damaris Correa MD   theophylline 200 mg Oral Daily Britta Ibarra MD   traZODone 25 mg Oral HS PRN Britta Ibarra MD   tuberculin 5 Units Intradermal Once Brenda Corado MD       Risks, benefits and possible side effects of Medications:   Risks, benefits, and possible side effects of medications explained to patient and patient verbalizes understanding

## 2019-05-29 NOTE — PLAN OF CARE
Problem: Alteration in Thoughts and Perception  Goal: Treatment Goal: Gain control of psychotic behaviors/thinking, reduce/eliminate presenting symptoms and demonstrate improved reality functioning upon discharge  Outcome: Progressing  Goal: Verbalize thoughts and feelings  Description  Interventions:  - Promote a nonjudgmental and trusting relationship with the patient through active listening and therapeutic communication  - Assess patient's level of functioning, behavior and potential for risk  - Engage patient in 1 on 1 interactions for a minimum of 15 minutes each session  - Encourage patient to express fears, feelings, frustrations, and discuss symptoms    - [place] patient to reality, help patient recognize reality-based thinking   - Administer medications as ordered and assess for potential side effects  - Provide the patient education related to the signs and symptoms of the illness and desired effects of prescribed medications  Outcome: Progressing  Goal: Refrain from acting on delusional thinking/internal stimuli  Description  Interventions:  - Monitor patient closely, per order   - Utilize least restrictive measures   - Set reasonable limits, give positive feedback for acceptable   - Administer medications as ordered and monitor of potential side effects  Outcome: Progressing  Goal: Agree to be compliant with medication regime, as prescribed and report medication side effects  Description  Interventions:  - Offer appropriate PRN medication and supervise ingestion; conduct aims, as needed   Outcome: Progressing  Goal: Attend and participate in unit activities, including therapeutic, recreational, and educational groups  Description  Interventions:  - Provide therapeutic and educational activities daily, encourage attendance and participation, and document same in the medical record   Outcome: Progressing  Goal: Recognize dysfunctional thoughts, communicate reality-based thoughts at the time of discharge  Description  Interventions:  - Provide medication and psycho-education to assist patient in compliance and developing insight into his/her illness   Outcome: Progressing  Goal: Complete daily ADLs, including personal hygiene independently, as able  Description  Interventions:  - Observe, teach, and assist patient with ADLS  - Monitor and promote a balance of rest/activity, with adequate nutrition and elimination   Outcome: Progressing     Problem: Ineffective Coping  Goal: Cooperates with admission process  Description  Interventions:   - Complete admission process  Outcome: Progressing  Goal: Identifies ineffective coping skills  Outcome: Progressing  Goal: Identifies healthy coping skills  Outcome: Progressing  Goal: Demonstrates healthy coping skills  Outcome: Progressing  Goal: Participates in unit activities  Description  Interventions:  - Provide therapeutic environment   - Provide required programming   - Redirect inappropriate behaviors   Outcome: Progressing  Goal: Patient/Family participate in treatment and DC plans  Description  Interventions:  - Provide therapeutic environment  Outcome: Progressing  Goal: Patient/Family verbalizes awareness of resources  Outcome: Progressing  Goal: Understands least restrictive measures  Description  Interventions:  - Utilize least restrictive behavior  Outcome: Progressing  Goal: Free from restraint events  Description  - Utilize least restrictive measures   - Provide behavioral interventions   - Redirect inappropriate behaviors   Outcome: Progressing     Problem: Risk for Self Injury/Neglect  Goal: Treatment Goal: Remain safe during length of stay, learn and adopt new coping skills, and be free of self-injurious ideation, impulses and acts at the time of discharge  Outcome: Progressing  Goal: Verbalize thoughts and feelings  Description  Interventions:  - Assess and re-assess patient's lethality and potential for self-injury  - Engage patient in 1:1 interactions, daily, for a minimum of 15 minutes  - Encourage patient to express feelings, fears, frustrations, hopes  - Establish rapport/trust with patient   Outcome: Progressing  Goal: Refrain from harming self  Description  Interventions:  - Monitor patient closely, per order  - Develop a trusting relationship  - Supervise medication ingestion, monitor effects and side effects   Outcome: Progressing  Goal: Attend and participate in unit activities, including therapeutic, recreational, and educational groups  Description  Interventions:  - Provide therapeutic and educational activities daily, encourage attendance and participation, and document same in the medical record  - Obtain collateral information, encourage visitation and family involvement in care   Outcome: Progressing  Goal: Recognize maladaptive responses and adopt new coping mechanisms  Outcome: Progressing  Goal: Complete daily ADLs, including personal hygiene independently, as able  Description  Interventions:  - Observe, teach, and assist patient with ADLS  - Monitor and promote a balance of rest/activity, with adequate nutrition and elimination  Outcome: Progressing     Problem: Depression  Goal: Treatment Goal: Demonstrate behavioral control of depressive symptoms, verbalize feelings of improved mood/affect, and adopt new coping skills prior to discharge  Outcome: Progressing  Goal: Verbalize thoughts and feelings  Description  Interventions:  - Assess and re-assess patient's level of risk   - Engage patient in 1:1 interactions, daily, for a minimum of 15 minutes   - Encourage patient to express feelings, fears, frustrations, hopes   Outcome: Progressing  Goal: Refrain from harming self  Description  Interventions:  - Monitor patient closely, per order   - Supervise medication ingestion, monitor effects and side effects   Outcome: Progressing  Goal: Refrain from isolation  Description  Interventions:  - Develop a trusting relationship   - Encourage socialization   Outcome: Progressing  Goal: Refrain from self-neglect  Outcome: Progressing  Goal: Attend and participate in unit activities, including therapeutic, recreational, and educational groups  Description  Interventions:  - Provide therapeutic and educational activities daily, encourage attendance and participation, and document same in the medical record   Outcome: Progressing  Goal: Complete daily ADLs, including personal hygiene independently, as able  Description  Interventions:  - Observe, teach, and assist patient with ADLS  -  Monitor and promote a balance of rest/activity, with adequate nutrition and elimination   Outcome: Progressing     Problem: Anxiety  Goal: Anxiety is at manageable level  Description  Interventions:  - Assess and monitor patient's anxiety level  - Monitor for signs and symptoms of anxiety both physical and emotional (heart palpitations, chest pain, shortness of breath, headaches, nausea, feeling jumpy, restlessness, irritable, apprehensive)  - Collaborate with interdisciplinary team and initiate plan and interventions as ordered    - [place] patient to unit/surroundings  - Explain treatment plan  - Encourage participation in care  - Encourage verbalization of concerns/fears  - Identify coping mechanisms  - Assist in developing anxiety-reducing skills  - Administer/offer alternative therapies  - Limit or eliminate stimulants  Outcome: Progressing     Problem: DISCHARGE PLANNING - CARE MANAGEMENT  Goal: Discharge to post-acute care or home with appropriate resources  Description  INTERVENTIONS:  - Conduct assessment to determine patient/family and health care team treatment goals, and need for post-acute services based on payer coverage, community resources, and patient preferences, and barriers to discharge  - Address psychosocial, clinical, and financial barriers to discharge as identified in assessment in conjunction with the patient/family and health care team  - Arrange appropriate level of post-acute services according to patients   needs and preference and payer coverage in collaboration with the physician and health care team  - Communicate with and update the patient/family, physician, and health care team regarding progress on the discharge plan  - Arrange appropriate transportation to post-acute venues  Outcome: Progressing     Problem: Prexisting or High Potential for Compromised Skin Integrity  Goal: Skin integrity is maintained or improved  Description  INTERVENTIONS:  - Identify patients at risk for skin breakdown  - Assess and monitor skin integrity  - Assess and monitor nutrition and hydration status  - Monitor labs (i e  albumin)  - Assess for incontinence   - Turn and reposition patient  - Assist with mobility/ambulation  - Relieve pressure over bony prominences  - Avoid friction and shearing  - Provide appropriate hygiene as needed including keeping skin clean and dry  - Evaluate need for skin moisturizer/barrier cream  - Collaborate with interdisciplinary team (i e  Nutrition, Rehabilitation, etc )   - Patient/family teaching  Outcome: Progressing
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
## 2019-05-29 NOTE — PLAN OF CARE
Problem: Alteration in Thoughts and Perception  Goal: Treatment Goal: Gain control of psychotic behaviors/thinking, reduce/eliminate presenting symptoms and demonstrate improved reality functioning upon discharge  Outcome: Progressing  Goal: Verbalize thoughts and feelings  Description  Interventions:  - Promote a nonjudgmental and trusting relationship with the patient through active listening and therapeutic communication  - Assess patient's level of functioning, behavior and potential for risk  - Engage patient in 1 on 1 interactions for a minimum of 15 minutes each session  - Encourage patient to express fears, feelings, frustrations, and discuss symptoms    - [place] patient to reality, help patient recognize reality-based thinking   - Administer medications as ordered and assess for potential side effects  - Provide the patient education related to the signs and symptoms of the illness and desired effects of prescribed medications  Outcome: Progressing  Goal: Refrain from acting on delusional thinking/internal stimuli  Description  Interventions:  - Monitor patient closely, per order   - Utilize least restrictive measures   - Set reasonable limits, give positive feedback for acceptable   - Administer medications as ordered and monitor of potential side effects  Outcome: Progressing  Goal: Agree to be compliant with medication regime, as prescribed and report medication side effects  Description  Interventions:  - Offer appropriate PRN medication and supervise ingestion; conduct aims, as needed   Outcome: Progressing  Goal: Recognize dysfunctional thoughts, communicate reality-based thoughts at the time of discharge  Description  Interventions:  - Provide medication and psycho-education to assist patient in compliance and developing insight into his/her illness   Outcome: Progressing  Goal: Complete daily ADLs, including personal hygiene independently, as able  Description  Interventions:  - Observe, teach, and assist patient with ADLS  - Monitor and promote a balance of rest/activity, with adequate nutrition and elimination   Outcome: Progressing     Problem: Risk for Self Injury/Neglect  Goal: Treatment Goal: Remain safe during length of stay, learn and adopt new coping skills, and be free of self-injurious ideation, impulses and acts at the time of discharge  Outcome: Progressing  Goal: Verbalize thoughts and feelings  Description  Interventions:  - Assess and re-assess patient's lethality and potential for self-injury  - Engage patient in 1:1 interactions, daily, for a minimum of 15 minutes  - Encourage patient to express feelings, fears, frustrations, hopes  - Establish rapport/trust with patient   Outcome: Progressing  Goal: Refrain from harming self  Description  Interventions:  - Monitor patient closely, per order  - Develop a trusting relationship  - Supervise medication ingestion, monitor effects and side effects   Outcome: Progressing  Goal: Recognize maladaptive responses and adopt new coping mechanisms  Outcome: Progressing  Goal: Complete daily ADLs, including personal hygiene independently, as able  Description  Interventions:  - Observe, teach, and assist patient with ADLS  - Monitor and promote a balance of rest/activity, with adequate nutrition and elimination  Outcome: Progressing     Problem: Depression  Goal: Treatment Goal: Demonstrate behavioral control of depressive symptoms, verbalize feelings of improved mood/affect, and adopt new coping skills prior to discharge  Outcome: Progressing  Goal: Verbalize thoughts and feelings  Description  Interventions:  - Assess and re-assess patient's level of risk   - Engage patient in 1:1 interactions, daily, for a minimum of 15 minutes   - Encourage patient to express feelings, fears, frustrations, hopes   Outcome: Progressing  Goal: Refrain from harming self  Description  Interventions:  - Monitor patient closely, per order   - Supervise medication ingestion, monitor effects and side effects   Outcome: Progressing  Goal: Refrain from isolation  Description  Interventions:  - Develop a trusting relationship   - Encourage socialization   Outcome: Progressing  Goal: Refrain from self-neglect  Outcome: Progressing  Goal: Complete daily ADLs, including personal hygiene independently, as able  Description  Interventions:  - Observe, teach, and assist patient with ADLS  -  Monitor and promote a balance of rest/activity, with adequate nutrition and elimination   Outcome: Progressing     Problem: Anxiety  Goal: Anxiety is at manageable level  Description  Interventions:  - Assess and monitor patient's anxiety level  - Monitor for signs and symptoms of anxiety both physical and emotional (heart palpitations, chest pain, shortness of breath, headaches, nausea, feeling jumpy, restlessness, irritable, apprehensive)  - Collaborate with interdisciplinary team and initiate plan and interventions as ordered    - [place] patient to unit/surroundings  - Explain treatment plan  - Encourage participation in care  - Encourage verbalization of concerns/fears  - Identify coping mechanisms  - Assist in developing anxiety-reducing skills  - Administer/offer alternative therapies  - Limit or eliminate stimulants  Outcome: Progressing     Problem: Prexisting or High Potential for Compromised Skin Integrity  Goal: Skin integrity is maintained or improved  Description  INTERVENTIONS:  - Identify patients at risk for skin breakdown  - Assess and monitor skin integrity  - Assess and monitor nutrition and hydration status  - Monitor labs (i e  albumin)  - Assess for incontinence   - Turn and reposition patient  - Assist with mobility/ambulation  - Relieve pressure over bony prominences  - Avoid friction and shearing  - Provide appropriate hygiene as needed including keeping skin clean and dry  - Evaluate need for skin moisturizer/barrier cream  - Collaborate with interdisciplinary team (i e  Nutrition, Rehabilitation, etc )   - Patient/family teaching  Outcome: Progressing

## 2019-05-29 NOTE — PROGRESS NOTES
Pt currently resting calmly in bed with eyes closed and nasal cannula in place  Will continue to monitor

## 2019-05-29 NOTE — PROGRESS NOTES
Pt c/o difficulty swallowing "from the medication"  No coughing or gagging observed at this time  Will discuss with attending  Will continue to monitor

## 2019-05-30 PROCEDURE — 99232 SBSQ HOSP IP/OBS MODERATE 35: CPT | Performed by: NURSE PRACTITIONER

## 2019-05-30 RX ORDER — PALIPERIDONE 6 MG/1
6 TABLET, EXTENDED RELEASE ORAL DAILY
Status: DISCONTINUED | OUTPATIENT
Start: 2019-05-31 | End: 2019-06-02

## 2019-05-30 RX ORDER — ALBUTEROL SULFATE 90 UG/1
2 AEROSOL, METERED RESPIRATORY (INHALATION) EVERY 4 HOURS PRN
Status: DISCONTINUED | OUTPATIENT
Start: 2019-05-30 | End: 2019-07-23 | Stop reason: HOSPADM

## 2019-05-30 RX ORDER — PALIPERIDONE 3 MG/1
3 TABLET, EXTENDED RELEASE ORAL DAILY
Status: DISCONTINUED | OUTPATIENT
Start: 2019-05-30 | End: 2019-05-30

## 2019-05-30 RX ADMIN — PALIPERIDONE 3 MG: 3 TABLET, FILM COATED, EXTENDED RELEASE ORAL at 09:19

## 2019-05-30 RX ADMIN — FLUTICASONE FUROATE AND VILANTEROL TRIFENATATE 1 PUFF: 200; 25 POWDER RESPIRATORY (INHALATION) at 09:18

## 2019-05-30 RX ADMIN — LEVOTHYROXINE SODIUM 125 MCG: 125 TABLET ORAL at 05:55

## 2019-05-30 RX ADMIN — THEOPHYLLINE ANHYDROUS 200 MG: 200 CAPSULE, EXTENDED RELEASE ORAL at 09:19

## 2019-05-30 RX ADMIN — PANTOPRAZOLE SODIUM 40 MG: 40 TABLET, DELAYED RELEASE ORAL at 05:55

## 2019-05-30 NOTE — PROGRESS NOTES
Per Family practice, fluids intake should be encouraged  Pt made aware, and a cup of water offered at this time

## 2019-05-30 NOTE — PROGRESS NOTES
Awake, alert, oriented, poor insight  Calling out at times  Irritable, labile, somatic, delusional  O2 at 3 LPM via nasal cannula  Will continue to monitor and set limits as needed

## 2019-05-30 NOTE — PROGRESS NOTES
Progress Note - Behavioral Health   Shena Erm 66 y o  female MRN: [id number]  Unit/Bed#: [facility] 920-47 Encounter: [id number]    The patient was seen for continuing care and reviewed with treatment team  Staff reports that the patient continues to be remain self isolating to room for most of the shift  No behavioral issues  Continues to be cooperative and compliant with medications  During assessment the patient was pleasant during conversation  Denies any depressive or anxiety symptoms  Denies any thoughts to hurt herself or others  Paranoia appears to be reduced  Remains circumstantial and focused on somatic complaints  Denies any auditory or visual hallucinations  Will continue to increase Invega to 6mg daily for symptom management  Continues to display poor insight and judgement into illness      Mental Status Evaluation:  Appearance:  Marginal/poor hygiene   Behavior:  cooperative   Fund of knowledge  aware of current events and Aware of past history   Speech:   Language: Normal rate and Normal volume  No overt abnormality   Mood:  improving   Affect:   Associations: blunted  Intact   Thought Process:  Circumstantial   Thought Content:  Obsessive ruminations   Perceptual Disturbances: Denies hallucinations and does not appear to be responding to internal stimuli   Risk Potential: No suicidal or homicidal ideation   Orientation  Oriented x 3   Memory No deficits   Attention/Concentration attention span appeared shorter than expected for age   Insight:  No insight   Judgment: Poor judgment   Gait/Station: Not observed   Motor Activity: No abnormal movement noted     Progress Toward Goals: improving    Assessment/Plan    Principal Problem:    Schizoaffective disorder, bipolar type ([facility])  Active Problems:    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[facility] [address] )    Acquired hypothyroidism    Gastroesophageal reflux disease without esophagitis      Recommended Treatment: Continue with pharmacotherapy, group therapy, milieu therapy and occupational therapy  The patient will be maintained on the following medications:    Current Facility-Administered Medications:  acetaminophen 650 mg Oral Q6H PRN Wili Meyer MD   albuterol 2 puff Inhalation Q4H PRN ABILIO Multani   aluminum-magnesium hydroxide-simethicone 15 mL Oral Q4H PRN Loreta Buchanan, MD   ammonium lactate  Topical BID PRN ABILIO Multani   benzonatate 100 mg Oral TID PRN Wili Meyer MD   benztropine 1 mg Intramuscular Q8H PRN Annamary Cure, MD   benztropine 1 mg Oral Q8H PRN Annamary Cure, MD   enoxaparin 40 mg Subcutaneous Daily Wili Meyer MD   fluticasone-vilanterol 1 puff Inhalation Daily Tata Fitch MD   haloperidol 1 mg Oral Q6H PRN Annamary Cure, MD   haloperidol lactate 2 mg Intramuscular Q6H PRN Annamary Cure, MD   hydrOXYzine HCL 25 mg Oral Q6H PRN Annamary Cure, MD   levothyroxine 125 mcg Oral Early Morning Wili Meyer MD   magnesium hydroxide 30 mL Oral Daily PRN Eb MD [place]   [START ON 5/31/2019] paliperidone 6 mg Oral Daily ABILIO Multani   pantoprazole 40 mg Oral Early Morning Wili Meyer MD   polyethylene glycol 17 g Oral Daily PRN ABILIO Multani   risperiDONE 1 mg Oral Q8H PRN Zeery Cure, MD   theophylline 200 mg Oral Daily Wili Meyer MD   traZODone 25 mg Oral HS PRN Wili Meyer MD   tuberculin 5 Units Intradermal Once Miguel A Klein MD       Risks, benefits and possible side effects of Medications:   Risks, benefits, and possible side effects of medications explained to patient and patient verbalizes understanding

## 2019-05-30 NOTE — PROGRESS NOTES
Pt present on the unit  Pt frequently rings and yells out staff's names  Pt appear somatic and behaviors  Pt reported anxiety, but declined and offer for Prn  Atarax  Pt stated "I want staff to stay with me"  Pt encouraged to come out of room to dayroom to be with peers in the milieu, Pt declined  Pt refused dinner, stayed in room throughout the shift

## 2019-05-30 NOTE — PLAN OF CARE
Problem: Alteration in Thoughts and Perception  Goal: Treatment Goal: Gain control of psychotic behaviors/thinking, reduce/eliminate presenting symptoms and demonstrate improved reality functioning upon discharge  Outcome: Progressing  Goal: Verbalize thoughts and feelings  Description  Interventions:  - Promote a nonjudgmental and trusting relationship with the patient through active listening and therapeutic communication  - Assess patient's level of functioning, behavior and potential for risk  - Engage patient in 1 on 1 interactions for a minimum of 15 minutes each session  - Encourage patient to express fears, feelings, frustrations, and discuss symptoms    - [place] patient to reality, help patient recognize reality-based thinking   - Administer medications as ordered and assess for potential side effects  - Provide the patient education related to the signs and symptoms of the illness and desired effects of prescribed medications  Outcome: Progressing  Goal: Refrain from acting on delusional thinking/internal stimuli  Description  Interventions:  - Monitor patient closely, per order   - Utilize least restrictive measures   - Set reasonable limits, give positive feedback for acceptable   - Administer medications as ordered and monitor of potential side effects  Outcome: Progressing  Goal: Agree to be compliant with medication regime, as prescribed and report medication side effects  Description  Interventions:  - Offer appropriate PRN medication and supervise ingestion; conduct aims, as needed   Outcome: Progressing  Goal: Recognize dysfunctional thoughts, communicate reality-based thoughts at the time of discharge  Description  Interventions:  - Provide medication and psycho-education to assist patient in compliance and developing insight into his/her illness   Outcome: Progressing  Goal: Complete daily ADLs, including personal hygiene independently, as able  Description  Interventions:  - Observe, teach, and assist patient with ADLS  - Monitor and promote a balance of rest/activity, with adequate nutrition and elimination   Outcome: Progressing     Problem: Risk for Self Injury/Neglect  Goal: Treatment Goal: Remain safe during length of stay, learn and adopt new coping skills, and be free of self-injurious ideation, impulses and acts at the time of discharge  Outcome: Progressing  Goal: Verbalize thoughts and feelings  Description  Interventions:  - Assess and re-assess patient's lethality and potential for self-injury  - Engage patient in 1:1 interactions, daily, for a minimum of 15 minutes  - Encourage patient to express feelings, fears, frustrations, hopes  - Establish rapport/trust with patient   Outcome: Progressing  Goal: Refrain from harming self  Description  Interventions:  - Monitor patient closely, per order  - Develop a trusting relationship  - Supervise medication ingestion, monitor effects and side effects   Outcome: Progressing  Goal: Recognize maladaptive responses and adopt new coping mechanisms  Outcome: Progressing  Goal: Complete daily ADLs, including personal hygiene independently, as able  Description  Interventions:  - Observe, teach, and assist patient with ADLS  - Monitor and promote a balance of rest/activity, with adequate nutrition and elimination  Outcome: Progressing     Problem: Depression  Goal: Treatment Goal: Demonstrate behavioral control of depressive symptoms, verbalize feelings of improved mood/affect, and adopt new coping skills prior to discharge  Outcome: Progressing  Goal: Verbalize thoughts and feelings  Description  Interventions:  - Assess and re-assess patient's level of risk   - Engage patient in 1:1 interactions, daily, for a minimum of 15 minutes   - Encourage patient to express feelings, fears, frustrations, hopes   Outcome: Progressing  Goal: Refrain from harming self  Description  Interventions:  - Monitor patient closely, per order   - Supervise medication ingestion, monitor effects and side effects   Outcome: Progressing  Goal: Refrain from isolation  Description  Interventions:  - Develop a trusting relationship   - Encourage socialization   Outcome: Progressing  Goal: Refrain from self-neglect  Outcome: Progressing  Goal: Complete daily ADLs, including personal hygiene independently, as able  Description  Interventions:  - Observe, teach, and assist patient with ADLS  -  Monitor and promote a balance of rest/activity, with adequate nutrition and elimination   Outcome: Progressing     Problem: Anxiety  Goal: Anxiety is at manageable level  Description  Interventions:  - Assess and monitor patient's anxiety level  - Monitor for signs and symptoms of anxiety both physical and emotional (heart palpitations, chest pain, shortness of breath, headaches, nausea, feeling jumpy, restlessness, irritable, apprehensive)  - Collaborate with interdisciplinary team and initiate plan and interventions as ordered    - [place] patient to unit/surroundings  - Explain treatment plan  - Encourage participation in care  - Encourage verbalization of concerns/fears  - Identify coping mechanisms  - Assist in developing anxiety-reducing skills  - Administer/offer alternative therapies  - Limit or eliminate stimulants  Outcome: Progressing     Problem: Prexisting or High Potential for Compromised Skin Integrity  Goal: Skin integrity is maintained or improved  Description  INTERVENTIONS:  - Identify patients at risk for skin breakdown  - Assess and monitor skin integrity  - Assess and monitor nutrition and hydration status  - Monitor labs (i e  albumin)  - Assess for incontinence   - Turn and reposition patient  - Assist with mobility/ambulation  - Relieve pressure over bony prominences  - Avoid friction and shearing  - Provide appropriate hygiene as needed including keeping skin clean and dry  - Evaluate need for skin moisturizer/barrier cream  - Collaborate with interdisciplinary team (i e  Nutrition, Rehabilitation, etc )   - Patient/family teaching  Outcome: Progressing

## 2019-05-30 NOTE — PROGRESS NOTES
Family practice made aware of Pt's BP trends for today  Waiting a call back from [facility]

## 2019-05-30 NOTE — QUICK NOTE
BP Readings from Last 1 Encounters:   05/30/19 98/54      Resident paged at [phone] regarding the patient's blood pressure  Patient is currently asymptomatic  However, she has not been drinking much today  Her blood pressures have ranged  systolic over 40-73 diastolic over the past several days  She is not on any antihypertensive medications  Encouraged increased fluid intake

## 2019-05-31 LAB
ALBUMIN SERPL BCP-MCNC: 3.5 G/DL (ref 3–5.2)
ALP SERPL-CCNC: 87 U/L (ref 43–122)
ALT SERPL W P-5'-P-CCNC: 40 U/L (ref 9–52)
ANION GAP SERPL CALCULATED.3IONS-SCNC: 7 MMOL/L (ref 5–14)
AST SERPL W P-5'-P-CCNC: 38 U/L (ref 14–36)
BASOPHILS # BLD AUTO: 0.07 THOUSAND/UL (ref 0–0.1)
BASOPHILS NFR MAR MANUAL: 1 % (ref 0–1)
BILIRUB SERPL-MCNC: 0.6 MG/DL
BUN SERPL-MCNC: 11 MG/DL (ref 5–25)
CALCIUM SERPL-MCNC: 9.5 MG/DL (ref 8.4–10.2)
CHLORIDE SERPL-SCNC: 102 MMOL/L (ref 97–108)
CO2 SERPL-SCNC: 29 MMOL/L (ref 22–30)
CREAT SERPL-MCNC: 0.59 MG/DL (ref 0.6–1.2)
EOSINOPHIL # BLD AUTO: 0.21 THOUSAND/UL (ref 0–0.4)
EOSINOPHIL NFR BLD MANUAL: 3 % (ref 0–6)
ERYTHROCYTE [DISTWIDTH] IN BLOOD BY AUTOMATED COUNT: 13.5 %
GFR SERPL CREATININE-BSD FRML MDRD: 99 ML/MIN/1.73SQ M
GLUCOSE P FAST SERPL-MCNC: 84 MG/DL (ref 70–99)
GLUCOSE SERPL-MCNC: 84 MG/DL (ref 70–99)
HCT VFR BLD AUTO: 39 % (ref 36–46)
HGB BLD-MCNC: 12.8 G/DL (ref 12–16)
LYMPHOCYTES # BLD AUTO: 3.76 THOUSAND/UL (ref 0.5–4)
LYMPHOCYTES # BLD AUTO: 53 % (ref 25–45)
MCH RBC QN AUTO: 30.8 PG (ref 26–34)
MCHC RBC AUTO-ENTMCNC: 32.8 G/DL (ref 31–36)
MCV RBC AUTO: 94 FL (ref 80–100)
MONOCYTES # BLD AUTO: 0.85 THOUSAND/UL (ref 0.2–0.9)
MONOCYTES NFR BLD AUTO: 12 % (ref 1–10)
NEUTS BAND NFR BLD MANUAL: 1 % (ref 0–8)
NEUTS SEG # BLD: 1.99 THOUSAND/UL (ref 1.8–7.8)
NEUTS SEG NFR BLD AUTO: 27 %
PLATELET # BLD AUTO: 239 THOUSANDS/UL (ref 150–450)
PLATELET BLD QL SMEAR: ADEQUATE
PMV BLD AUTO: 9.9 FL (ref 8.9–12.7)
POTASSIUM SERPL-SCNC: 3.8 MMOL/L (ref 3.6–5)
PROT SERPL-MCNC: 6.6 G/DL (ref 5.9–8.4)
RBC # BLD AUTO: 4.16 MILLION/UL (ref 4–5.2)
RBC MORPH BLD: NORMAL
SODIUM SERPL-SCNC: 138 MMOL/L (ref 137–147)
TOTAL CELLS COUNTED SPEC: 100
VARIANT LYMPHS # BLD AUTO: 3 % (ref 0–0)
WBC # BLD AUTO: 7.1 THOUSAND/UL (ref 4.5–11)

## 2019-05-31 PROCEDURE — 80053 COMPREHEN METABOLIC PANEL: CPT | Performed by: NURSE PRACTITIONER

## 2019-05-31 PROCEDURE — 99232 SBSQ HOSP IP/OBS MODERATE 35: CPT | Performed by: PSYCHIATRY & NEUROLOGY

## 2019-05-31 PROCEDURE — 0HBRXZZ EXCISION OF TOE NAIL, EXTERNAL APPROACH: ICD-10-PCS | Performed by: PSYCHIATRY & NEUROLOGY

## 2019-05-31 PROCEDURE — 85007 BL SMEAR W/DIFF WBC COUNT: CPT | Performed by: NURSE PRACTITIONER

## 2019-05-31 PROCEDURE — 85027 COMPLETE CBC AUTOMATED: CPT | Performed by: NURSE PRACTITIONER

## 2019-05-31 RX ADMIN — THEOPHYLLINE ANHYDROUS 200 MG: 200 CAPSULE, EXTENDED RELEASE ORAL at 08:21

## 2019-05-31 RX ADMIN — PALIPERIDONE 6 MG: 6 TABLET, FILM COATED, EXTENDED RELEASE ORAL at 08:21

## 2019-05-31 RX ADMIN — PANTOPRAZOLE SODIUM 40 MG: 40 TABLET, DELAYED RELEASE ORAL at 05:49

## 2019-05-31 RX ADMIN — LEVOTHYROXINE SODIUM 125 MCG: 125 TABLET ORAL at 05:48

## 2019-05-31 NOTE — PROGRESS NOTES
Pt continues to ring call bell continuously  RN spoke with pt about coming to nurse's station if she needs to speak to a staff member, but pt refuses, stating "I can't breathe"  Pt ambulates independently and without major O2 saturation drop  RN informed pt of this, yet pt still insists that she can't walk without oxygen  Will continue to encourage ambulation for a healthy recovery

## 2019-05-31 NOTE — PLAN OF CARE
Problem: Alteration in Thoughts and Perception  Goal: Treatment Goal: Gain control of psychotic behaviors/thinking, reduce/eliminate presenting symptoms and demonstrate improved reality functioning upon discharge  Outcome: Progressing  Goal: Verbalize thoughts and feelings  Description  Interventions:  - Promote a nonjudgmental and trusting relationship with the patient through active listening and therapeutic communication  - Assess patient's level of functioning, behavior and potential for risk  - Engage patient in 1 on 1 interactions for a minimum of 15 minutes each session  - Encourage patient to express fears, feelings, frustrations, and discuss symptoms    - Ayr patient to reality, help patient recognize reality-based thinking   - Administer medications as ordered and assess for potential side effects  - Provide the patient education related to the signs and symptoms of the illness and desired effects of prescribed medications  Outcome: Progressing  Goal: Refrain from acting on delusional thinking/internal stimuli  Description  Interventions:  - Monitor patient closely, per order   - Utilize least restrictive measures   - Set reasonable limits, give positive feedback for acceptable   - Administer medications as ordered and monitor of potential side effects  Outcome: Not Progressing  Goal: Agree to be compliant with medication regime, as prescribed and report medication side effects  Description  Interventions:  - Offer appropriate PRN medication and supervise ingestion; conduct aims, as needed   Outcome: Progressing  Goal: Attend and participate in unit activities, including therapeutic, recreational, and educational groups  Description  Interventions:  - Provide therapeutic and educational activities daily, encourage attendance and participation, and document same in the medical record   Outcome: Not Progressing  Goal: Recognize dysfunctional thoughts, communicate reality-based thoughts at the time of discharge  Description  Interventions:  - Provide medication and psycho-education to assist patient in compliance and developing insight into his/her illness   Outcome: Progressing  Goal: Complete daily ADLs, including personal hygiene independently, as able  Description  Interventions:  - Observe, teach, and assist patient with ADLS  - Monitor and promote a balance of rest/activity, with adequate nutrition and elimination   Outcome: Not Progressing     Problem: Ineffective Coping  Goal: Cooperates with admission process  Description  Interventions:   - Complete admission process  Outcome: Completed  Goal: Identifies ineffective coping skills  Outcome: Not Progressing  Goal: Identifies healthy coping skills  Outcome: Not Progressing  Goal: Demonstrates healthy coping skills  Outcome: Not Progressing  Goal: Participates in unit activities  Description  Interventions:  - Provide therapeutic environment   - Provide required programming   - Redirect inappropriate behaviors   Outcome: Not Progressing  Goal: Patient/Family participate in treatment and DC plans  Description  Interventions:  - Provide therapeutic environment  Outcome: Not Progressing  Goal: Patient/Family verbalizes awareness of resources  Outcome: Not Progressing  Goal: Understands least restrictive measures  Description  Interventions:  - Utilize least restrictive behavior  Outcome: Not Progressing  Goal: Free from restraint events  Description  - Utilize least restrictive measures   - Provide behavioral interventions   - Redirect inappropriate behaviors   Outcome: Progressing     Problem: Risk for Self Injury/Neglect  Goal: Treatment Goal: Remain safe during length of stay, learn and adopt new coping skills, and be free of self-injurious ideation, impulses and acts at the time of discharge  Outcome: Progressing  Goal: Verbalize thoughts and feelings  Description  Interventions:  - Assess and re-assess patient's lethality and potential for self-injury  - Engage patient in 1:1 interactions, daily, for a minimum of 15 minutes  - Encourage patient to express feelings, fears, frustrations, hopes  - Establish rapport/trust with patient   Outcome: Progressing  Goal: Refrain from harming self  Description  Interventions:  - Monitor patient closely, per order  - Develop a trusting relationship  - Supervise medication ingestion, monitor effects and side effects   Outcome: Progressing  Goal: Attend and participate in unit activities, including therapeutic, recreational, and educational groups  Description  Interventions:  - Provide therapeutic and educational activities daily, encourage attendance and participation, and document same in the medical record  - Obtain collateral information, encourage visitation and family involvement in care   Outcome: Progressing  Goal: Recognize maladaptive responses and adopt new coping mechanisms  Outcome: Not Progressing  Goal: Complete daily ADLs, including personal hygiene independently, as able  Description  Interventions:  - Observe, teach, and assist patient with ADLS  - Monitor and promote a balance of rest/activity, with adequate nutrition and elimination  Outcome: Not Progressing     Problem: Depression  Goal: Treatment Goal: Demonstrate behavioral control of depressive symptoms, verbalize feelings of improved mood/affect, and adopt new coping skills prior to discharge  Outcome: Progressing  Goal: Verbalize thoughts and feelings  Description  Interventions:  - Assess and re-assess patient's level of risk   - Engage patient in 1:1 interactions, daily, for a minimum of 15 minutes   - Encourage patient to express feelings, fears, frustrations, hopes   Outcome: Progressing  Goal: Refrain from harming self  Description  Interventions:  - Monitor patient closely, per order   - Supervise medication ingestion, monitor effects and side effects   Outcome: Progressing  Goal: Refrain from isolation  Description  Interventions:  - Develop a trusting relationship   - Encourage socialization   Outcome: Not Progressing  Goal: Refrain from self-neglect  Outcome: Not Progressing  Goal: Attend and participate in unit activities, including therapeutic, recreational, and educational groups  Description  Interventions:  - Provide therapeutic and educational activities daily, encourage attendance and participation, and document same in the medical record   Outcome: Progressing  Goal: Complete daily ADLs, including personal hygiene independently, as able  Description  Interventions:  - Observe, teach, and assist patient with ADLS  -  Monitor and promote a balance of rest/activity, with adequate nutrition and elimination   Outcome: Not Progressing     Problem: Anxiety  Goal: Anxiety is at manageable level  Description  Interventions:  - Assess and monitor patient's anxiety level  - Monitor for signs and symptoms of anxiety both physical and emotional (heart palpitations, chest pain, shortness of breath, headaches, nausea, feeling jumpy, restlessness, irritable, apprehensive)  - Collaborate with interdisciplinary team and initiate plan and interventions as ordered    - Maxwell patient to unit/surroundings  - Explain treatment plan  - Encourage participation in care  - Encourage verbalization of concerns/fears  - Identify coping mechanisms  - Assist in developing anxiety-reducing skills  - Administer/offer alternative therapies  - Limit or eliminate stimulants  Outcome: Progressing

## 2019-05-31 NOTE — CASE MANAGEMENT
Patient continues to be somatic and delusional  Patient consistently yelling out for writer and other staff when anyone walks by  Patient still fixated on oxygen tank  Patient continues to show signs of little to no insight into her mental health and need for medication  CM will continue to follow and provide services as needed

## 2019-05-31 NOTE — PROGRESS NOTES
Monitored on safety checks  Currently resting in bed, eyes closed and  respirations noted on O2 at 3L via nasal cannula

## 2019-05-31 NOTE — PROGRESS NOTES
[address] Geriatric Psychiatry  Psychiatrists  Progress Note    Patient Name: Pradeep Crowley  MRN: [id number]  DOS: 05/31/19    Chief Complaint:  paranoid delusions    Interval History: Patient has had somatic delusions about not being able to breathe, that her blood sugar is low, etc  Has been demanding numerous interventions to assess  Has been compliant with invega though she thinks it's causing low appetite  She remains illogical      Mental Status Exam [Per above +]  Appearance: disheveled; adequate hygiene; no abnormal involuntary movements noted  Behavior: oddly related, cooperative  Speech: wnl  Mood: anxious  Affect: congruent, intense, stable  Thought process: illogical, tangential  Thought content: somatic delusions noted; denies SI/HI  Perceptual disturbances: denies AH/VH  Cognition: oriented to all domains     Insight: poor  Judgement: limited      Current Facility-Administered Medications:     acetaminophen (TYLENOL) tablet 650 mg, 650 mg, Oral, Q6H PRN, Michelene Kawasaki, MD    albuterol (PROVENTIL HFA,VENTOLIN HFA) inhaler 2 puff, 2 puff, Inhalation, Q4H PRN, Rene Aliment, CRNP    aluminum-magnesium hydroxide-simethicone (MYLANTA) 200-200-20 mg/5 mL oral suspension 15 mL, 15 mL, Oral, Q4H PRN, Forest Reza MD, 15 mL at 05/12/19 2221    ammonium lactate (LAC-HYDRIN) 12 % lotion, , Topical, BID PRN, Rene Aliment, CRNP, 1 application at 52/11/50 1114    benzonatate (TESSALON PERLES) capsule 100 mg, 100 mg, Oral, TID PRN, Michelene Kawasaki, MD    benztropine (COGENTIN) injection 1 mg, 1 mg, Intramuscular, Q8H PRN, Forest Reza MD    benztropine (COGENTIN) tablet 1 mg, 1 mg, Oral, Q8H PRN, Forest Reza MD    enoxaparin (LOVENOX) subcutaneous injection 40 mg, 40 mg, Subcutaneous, Daily, Michelene Kawasaki, MD    [facility] Hold] fluticasone-vilanterol (BREO ELLIPTA) 200-25 MCG/INH inhaler 1 puff, 1 puff, Inhalation, Daily, Elsy Weathers MD, 1 puff at 05/30/19 0918    haloperidol (HALDOL) oral concentrated solution 1 mg, 1 mg, Oral, Q6H PRN, Francesco Ferris MD    haloperidol lactate (HALDOL) injection 2 mg, 2 mg, Intramuscular, Q6H PRN, Francesco Ferris MD    hydrOXYzine HCL (ATARAX) tablet 25 mg, 25 mg, Oral, Q6H PRN, Francesco Ferris MD, 25 mg at 05/29/19 1326    levothyroxine tablet 125 mcg, 125 mcg, Oral, Early Morning, Scott Dias MD, 125 mcg at 05/31/19 0548    magnesium hydroxide (MILK OF MAGNESIA) 400 mg/5 mL oral suspension 30 mL, 30 mL, Oral, Daily PRN, Jon Vivar MD    paliperidone (INVEGA) 24 hr tablet 6 mg, 6 mg, Oral, Daily, ABILIO Castle, 6 mg at 05/31/19 5197    pantoprazole (PROTONIX) EC tablet 40 mg, 40 mg, Oral, Early Morning, Scott Dias MD, 40 mg at 05/31/19 0549    polyethylene glycol (MIRALAX) packet 17 g, 17 g, Oral, Daily PRN, ABILIO Castle    risperiDONE (RisperDAL M-TABS) dispersible tablet 1 mg, 1 mg, Oral, Q8H PRN, Francesco Ferris MD    theophylline (JEF-24) 24 hr capsule 200 mg, 200 mg, Oral, Daily, Scott Dias MD, 200 mg at 05/31/19 2045    traZODone (DESYREL) tablet 25 mg, 25 mg, Oral, HS PRN, Scott Dias MD    tuberculin injection 5 Units, 5 Units, Intradermal, Once, Shea Davis MD, Stopped at 05/22/19 1425    Vitals:    05/31/19 1500   BP: 104/56   Pulse: 92   Resp: 18   Temp: 98 4 °F (36 9 °C)   SpO2: 95%       Lab/work up: no new labs    Assessment:     Axis I:  · Schizoaffective disorder; has been treatment resistant  Axis II:  · OCPD  · Cluster B traits  Axis III:  - recovering from sepsis 2/2 CAP, COPD with asthma, stable compression fracture of L4   Axis IV:  · Limited social support, chronically homeless, financial problems, on disability, noncompliant with treatment    Progress: limited    Plan:   1  Disposition: Patient meets criteria for ongoing acute inpatient treatment due to being danger to self 2/2 psychosis and poor self care   Patient will be discharged when there is an absence of psychosis [id number]  2  Legal status: 304  3  Psychopharmacologic interventions:   - continue invega 6mg po daily  - Continue to monitor psychosis  4  Medical comorbidities: Hospitalist following PRN; refused PPD  5  Other therapies: Individual/group/milieu therapy as appropriate   6  Social issues: Case management following to arrange disposition - will pursue state hospitalization  7   Precautions: Routine q15min checks, vitals qshift    Heather Huggins MD  05/31/19

## 2019-05-31 NOTE — PROGRESS NOTES
Clinical Pharmacy Note: Antipsychotic Monitoring Requirements     Jarold Soulier is a 66 y o  female currently on the following medications:    Current Facility-Administered Medications:     acetaminophen (TYLENOL) tablet 650 mg, 650 mg, Oral, Q6H PRN, Denisha Norris MD    albuterol (PROVENTIL HFA,VENTOLIN HFA) inhaler 2 puff, 2 puff, Inhalation, Q4H PRN, ABILIO Stringer    aluminum-magnesium hydroxide-simethicone (MYLANTA) 200-200-20 mg/5 mL oral suspension 15 mL, 15 mL, Oral, Q4H PRN, Dick Thayer MD, 15 mL at 05/12/19 2221    ammonium lactate (LAC-HYDRIN) 12 % lotion, , Topical, BID PRN, ABILIO Stringer, 1 application at 48/92/89 1114    benzonatate (TESSALON PERLES) capsule 100 mg, 100 mg, Oral, TID PRN, Denisha Norris MD    benztropine (COGENTIN) injection 1 mg, 1 mg, Intramuscular, Q8H PRN, Dick Thayer MD    benztropine (COGENTIN) tablet 1 mg, 1 mg, Oral, Q8H PRN, Dick Thayer MD    enoxaparin (LOVENOX) subcutaneous injection 40 mg, 40 mg, Subcutaneous, Daily, Denisha Norris MD    [MAR Hold] fluticasone-vilanterol (BREO ELLIPTA) 200-25 MCG/INH inhaler 1 puff, 1 puff, Inhalation, Daily, Barb Downey MD, 1 puff at 05/30/19 3289    haloperidol (HALDOL) oral concentrated solution 1 mg, 1 mg, Oral, Q6H PRN, Dick Thayer MD    haloperidol lactate (HALDOL) injection 2 mg, 2 mg, Intramuscular, Q6H PRN, Dick Thayer MD    hydrOXYzine HCL (ATARAX) tablet 25 mg, 25 mg, Oral, Q6H PRN, Dick Thayer MD, 25 mg at 05/29/19 1326    levothyroxine tablet 125 mcg, 125 mcg, Oral, Early Morning, Denisha Norris MD, 125 mcg at 05/31/19 0548    magnesium hydroxide (MILK OF MAGNESIA) 400 mg/5 mL oral suspension 30 mL, 30 mL, Oral, Daily PRN, Nilsa Guajardo MD    paliperidone (INVEGA) 24 hr tablet 6 mg, 6 mg, Oral, Daily, ABILIO Stringer, 6 mg at 05/31/19 0821    pantoprazole (PROTONIX) EC tablet 40 mg, 40 mg, Oral, Early Morning, Denisha Norris MD, 40 mg at 05/31/19 0539    polyethylene glycol (MIRALAX) packet 17 g, 17 g, Oral, Daily PRN, ABILIO Trivedi    risperiDONE (RisperDAL M-TABS) dispersible tablet 1 mg, 1 mg, Oral, Q8H PRN, Ruddy Jorge MD    theophylline (JEF-24) 24 hr capsule 200 mg, 200 mg, Oral, Daily, Yana Werner MD, 200 mg at 05/31/19 8772    traZODone (DESYREL) tablet 25 mg, 25 mg, Oral, HS PRN, Yana Werner MD    tuberculin injection 5 Units, 5 Units, Intradermal, Once, Chirag Nogueira MD, Stopped at 05/22/19 1425    Performing metabolic monitoring on patients receiving any antipsychotics is a Centers for [facility] and Medicaid Services (CMS) requirement  Antipsychotic treatment increases the risk of developing type 2 diabetes mellitus, hypertension, and hyperlipidemia  According to the [facility] [place], [place] [address] (NICE) guidelines, baseline weight, waist circumference, pulse, blood pressure, fasting blood glucose, hemoglobin A1c, lipid profile, and prolactin level (if initiating risperidone or paliperidone) should be collected  These guidelines coincide with Centers and Medicare & Medicaid Services (CMS) requirements including baseline Body Mass Index, blood pressure, fasting glucose or hemoglobin A1c, and fasting lipid panel  CMS states laboratory values collected 12 months from discharge date are acceptable for evaluation  CMS Checklist:     BMI: yes  BP: yes  Fasting glucose: yes       OR A1c: yes  Lipid panel within last 12 months: yes  Nicotine Replacement Therapy: no, patient refused    The following values have been collected:  Value Status Result    BMI Body mass index is 25 43 kg/m²     Blood pressure BP 93/53 (BP Location: Left arm)   Pulse 84   Temp 97 9 °F (36 6 °C) (Temporal)   Resp 20   Ht 5' 4" (1 626 m)   Wt 67 2 kg (148 lb 2 4 oz)   SpO2 95%   BMI 25 43 kg/m²    Fasting glucose 0   Lab Value Date/Time    GLUC 84 05/31/2019 0526        Hemoglobin A1c 0   Lab Value Date/Time    HGBA1C 5 5 01/17/2019 1647    HGBA1C 5 9 (H) 04/27/2015 0530        Lipid panel 0   Lab Value Date/Time    CHOLESTEROL 131 05/02/2019 0524       0   Lab Value Date/Time    HDL 42 05/02/2019 0524    HDL 74 01/23/2015 0928       0   Lab Value Date/Time    LDLCALC 76 05/02/2019 0524    LDLCALC 121 (H) 01/23/2015 0928        0   Lab Value Date/Time    TRIG 63 05/02/2019 0524    TRIG 123 01/23/2015 0928          Recommendations:  CMS requirements have been completed    Pharmacy will continue to follow patient with team   Electronically signed by: Harriet Su, Pharmacist

## 2019-05-31 NOTE — CONSULTS
Consult Routine Foot Care- Podiatry   Tita Dejesus 66 y o  female MRN: [id number]  Unit/Bed#: [facility] 340-49 Encounter: [id number]    Assessment/Plan     Assessment:  1  Onychomycosis x 10  2  PVD     Plan:  - pt eval and managed today  - Hallucal mycotic nails x10 debrided decreasing thickness by 1 mm   without incidence utilizing a sharp nail nipper  - All questions and concerns addressed  - pt to follow up with Podiatry as outpt  - Podiatry signing off, thank you for the consult  History of Present Illness     HPI:  Tita Dejesus is a 66 y o  female who presents with painful, elongated toenails  They state that they see no Podiatrist outpatient  They have difficulty applying their socks and shoes due to the elongation of the nails  The pressure within their shoe gear is painful and they have been unable to cut their nails adequately  Inpatient consult to Podiatry  Consult performed by: Pallavi Partida DPM  Consult ordered by: Tamy Luke MD        Review of Systems   Constitutional: Negative  HENT: Negative  Eyes: Negative  Respiratory: Negative  Cardiovascular: Negative  Gastrointestinal: Negative  Musculoskeletal: Negative   Skin: elongated thickened toenails   Neurological: Negative  Historical Information   Past Medical History:   Diagnosis Date    Acid reflux     Anxiety     Asthma     Bipolar 1 disorder (HCC)     Chronic pain disorder     Chronic respiratory failure (HCC)     Compression fracture of fourth lumbar vertebra (HCC)     COPD (chronic obstructive pulmonary disease) (HCC)     Depression     GERD (gastroesophageal reflux disease)     History of home oxygen therapy     Hypothyroidism     Lipoma of upper extremity     Psychiatric illness     Schizoaffective disorder (HCC)     Substance abuse (HCC)     Nicotine    Thoracic compression fracture (HCC)     Ventral hernia      No past surgical history on file    Social History   Social History Substance and Sexual Activity   Alcohol Use Never    Frequency: Never    Binge frequency: Never     Social History     Substance and Sexual Activity   Drug Use No     Social History     Tobacco Use   Smoking Status Current Every Day Smoker    Packs/day: 0 20    Types: Cigarettes   Smokeless Tobacco Never Used     Family History:   Family History   Problem Relation Age of Onset    Arthritis Mother        Meds/Allergies   Medications Prior to Admission   Medication    OXYGEN-HELIUM IN    acetaminophen (TYLENOL) 325 mg tablet    albuterol (PROVENTIL HFA,VENTOLIN HFA) 90 mcg/act inhaler    EPINEPHrine (EPIPEN JR) 0 15 mg/0 3 mL SOAJ    fluticasone-salmeterol (ADVAIR DISKUS, WIXELA INHUB) 250-50 mcg/dose inhaler    levothyroxine 125 mcg tablet    pantoprazole (PROTONIX) 20 mg tablet    theophylline (JEF-24) 200 MG 24 hr capsule     Allergies   Allergen Reactions    Peanut-Containing Drug Products Anaphylaxis    Shellfish-Derived Products Anaphylaxis    Antihistamines, Chlorpheniramine-Type     Aspirin     Atrovent [Ipratropium]     Elavil [Amitriptyline]     Iodine      Other reaction(s): Unknown Reaction    Levaquin [Levofloxacin]     Novocain [Procaine]     Penicillins      Able to tolerate azithromycin and vancomycin    Prolixin [Fluphenazine]     Sulfa Antibiotics Other (See Comments)     Unknown Zithromax-Tight Throat       Objective   First Vitals:   Blood Pressure: 114/70 (05/05/19 1926)  Pulse: 87 (05/05/19 1926)  Temperature: 98 °F (36 7 °C) (05/05/19 1926)  Temp Source: Temporal (05/05/19 1926)  Respirations: 16 (05/05/19 1926)  Height: 5' 4" (162 6 cm) (05/05/19 1926)  Weight - Scale: 67 2 kg (148 lb 2 4 oz) (05/05/19 1926)  SpO2: 96 % (05/05/19 1926)    Current Vitals:   Blood Pressure: 93/53 (05/31/19 0659)  Pulse: 84 (05/31/19 0659)  Temperature: 97 9 °F (36 6 °C) (05/31/19 0659)  Temp Source: Temporal (05/31/19 0659)  Respirations: 20 (05/31/19 0659)  Height: 5' 4" (162 6 cm) (05/13/19 1528)  Weight - Scale: 67 2 kg (148 lb 2 4 oz) (05/05/19 1926)  SpO2: 95 % (05/31/19 0659)    BP 93/53 (BP Location: Left arm)   Pulse 84   Temp 97 9 °F (36 6 °C) (Temporal)   Resp 20   Ht 5' 4" (1 626 m)   Wt 67 2 kg (148 lb 2 4 oz)   SpO2 95%   BMI 25 43 kg/m²     General Appearance:    Alert, cooperative, no distress   Head:    Normocephalic, without obvious abnormality, atraumatic   Eyes:    PERRL, conjunctiva/corneas clear, EOM's intact            Nose:   Moist mucous membranes, no drainage or sinus tenderness   Throat:   No tenderness, no exudates   Neck:   Supple, symmetrical, trachea midline, no JVD   Back:     Symmetric, no CVA tenderness   Lungs:     Clear to auscultation bilaterally, respirations unlabored   Chest wall:    No tenderness or deformity   Heart:    Regular rate and rhythm, S1 and S2 normal, no murmur, rub   or gallop   Abdomen:     Soft, non-tender, bowel sounds active all four quadrants,     no masses, no organomegaly     Extremities:   MMT is 4/5 to all compartments of the LE, +1/4 edema B/L, Digital ROM is intact,    Pulses:   R DP is +1/4, R PT is +1/4, L DP is +1/4, L PT is +1/4, CFT< 3sec to all digits  Thin/shiny skin noted to the B/L LE, pigmentary changes to B/L LE  Absent digital hair growth b/l  Nail thickening b/l   Skin:   Nails are yellow discolored, thickened, elongated, with notable subungual debris and > 2mm thickness  Yellow discoloration noted to toenails 1-5 B/L  No open Lesions  Neurologic:   CNII-XII intact  Normal strength, sensation and reflexes       Throughout  Gross sensation is intact   Protective sensation is diminished       Lab Results:   Admission on 05/05/2019   Component Date Value    POC Glucose 05/10/2019 156*    Clarity, UA 05/29/2019 Clear     Color, UA 05/29/2019 Straw     Specific [place], UA 05/29/2019 1 015     pH, UA 05/29/2019 8 0     Glucose, UA 05/29/2019 Negative     Ketones, UA 05/29/2019 Negative     Blood, UA 05/29/2019 Negative     Protein, UA 05/29/2019 Negative     Nitrite, UA 05/29/2019 Negative     Bilirubin, UA 05/29/2019 Negative     Leukocytes, UA 05/29/2019 Negative     WBC, UA 05/29/2019 0-1*    RBC, UA 05/29/2019 0-1*    Bacteria, UA 05/29/2019 Occasional     Epithelial Cells 05/29/2019 Occasional     UROBILINOGEN UA 05/29/2019 Negative     WBC 05/31/2019 7 10     RBC 05/31/2019 4 16     Hemoglobin 05/31/2019 12 8     Hematocrit 05/31/2019 39 0     MCV 05/31/2019 94     MCH 05/31/2019 30 8     MCHC 05/31/2019 32 8     RDW 05/31/2019 13 5     MPV 05/31/2019 9 9     Platelets 00/81/9930 239     Sodium 05/31/2019 138     Potassium 05/31/2019 3 8     Chloride 05/31/2019 102     CO2 05/31/2019 29     ANION GAP 05/31/2019 7     BUN 05/31/2019 11     Creatinine 05/31/2019 0 59*    Glucose 05/31/2019 84     Glucose, Fasting 05/31/2019 84     Calcium 05/31/2019 9 5     AST 05/31/2019 38*    ALT 05/31/2019 40     Alkaline Phosphatase 05/31/2019 87     Total Protein 05/31/2019 6 6     Albumin 05/31/2019 3 5     Total Bilirubin 05/31/2019 0 60     eGFR 05/31/2019 99      Imaging: I have personally reviewed pertinent films in PACS  EKG, Pathology, and Other Studies: I have personally reviewed pertinent reports        Code Status: Level 1 - Full Code  Advance Directive and Living Will:      Power of :    POLST:

## 2019-05-31 NOTE — PROGRESS NOTES
Pt stays in her room, comes out for meals  Alert and oriented x4  Med compliant  Pt yells out for staff and frequently pushes call bell  Pt is somatic and delusional   Continues to insist that staff is trying to kill her  Will continue to encourage group participation for a healthy recovery

## 2019-05-31 NOTE — PROGRESS NOTES
Pt present on the unit, Pt less somatic today  Pt encouraged fluids throughout this shift for trends of low BP  Pt compliant with fluid intake  Pt stays in the room most of the day  Denies s/s

## 2019-06-01 PROCEDURE — 99232 SBSQ HOSP IP/OBS MODERATE 35: CPT | Performed by: PSYCHIATRY & NEUROLOGY

## 2019-06-01 RX ADMIN — PANTOPRAZOLE SODIUM 40 MG: 40 TABLET, DELAYED RELEASE ORAL at 05:06

## 2019-06-01 RX ADMIN — LEVOTHYROXINE SODIUM 125 MCG: 125 TABLET ORAL at 05:06

## 2019-06-01 RX ADMIN — THEOPHYLLINE ANHYDROUS 200 MG: 200 CAPSULE, EXTENDED RELEASE ORAL at 08:33

## 2019-06-01 RX ADMIN — PALIPERIDONE 6 MG: 6 TABLET, FILM COATED, EXTENDED RELEASE ORAL at 08:33

## 2019-06-01 RX ADMIN — FLUTICASONE FUROATE AND VILANTEROL TRIFENATATE 1 PUFF: 200; 25 POWDER RESPIRATORY (INHALATION) at 08:33

## 2019-06-01 NOTE — PROGRESS NOTES
Pt came out to nurse's station to argue with staff about her O2 requirements  Pt informed that the order for her oxygen requirements is to maintain O2 saturation between 88-92%  RN also informed pt that her random pulse ox has been 92%  Pt continued to vent her frustration, stating that her O2 has been at 3L for "the past 9 years" and that "my insurance isn't going to pay for this"  Pt also states "this oxygen has to be reduced gradually"  Pt is difficult to redirect  Preoccupied with O2 tank

## 2019-06-01 NOTE — PLAN OF CARE
Problem: Alteration in Thoughts and Perception  Goal: Treatment Goal: Gain control of psychotic behaviors/thinking, reduce/eliminate presenting symptoms and demonstrate improved reality functioning upon discharge  Outcome: Progressing  Goal: Verbalize thoughts and feelings  Description  Interventions:  - Promote a nonjudgmental and trusting relationship with the patient through active listening and therapeutic communication  - Assess patient's level of functioning, behavior and potential for risk  - Engage patient in 1 on 1 interactions for a minimum of 15 minutes each session  - Encourage patient to express fears, feelings, frustrations, and discuss symptoms    - [place] patient to reality, help patient recognize reality-based thinking   - Administer medications as ordered and assess for potential side effects  - Provide the patient education related to the signs and symptoms of the illness and desired effects of prescribed medications  Outcome: Progressing  Goal: Refrain from acting on delusional thinking/internal stimuli  Description  Interventions:  - Monitor patient closely, per order   - Utilize least restrictive measures   - Set reasonable limits, give positive feedback for acceptable   - Administer medications as ordered and monitor of potential side effects  Outcome: Progressing  Goal: Agree to be compliant with medication regime, as prescribed and report medication side effects  Description  Interventions:  - Offer appropriate PRN medication and supervise ingestion; conduct aims, as needed   Outcome: Progressing  Goal: Attend and participate in unit activities, including therapeutic, recreational, and educational groups  Description  Interventions:  - Provide therapeutic and educational activities daily, encourage attendance and participation, and document same in the medical record   Outcome: Progressing  Goal: Recognize dysfunctional thoughts, communicate reality-based thoughts at the time of discharge  Description  Interventions:  - Provide medication and psycho-education to assist patient in compliance and developing insight into his/her illness   Outcome: Progressing  Goal: Complete daily ADLs, including personal hygiene independently, as able  Description  Interventions:  - Observe, teach, and assist patient with ADLS  - Monitor and promote a balance of rest/activity, with adequate nutrition and elimination   Outcome: Progressing     Problem: Ineffective Coping  Goal: Identifies ineffective coping skills  Outcome: Progressing  Goal: Identifies healthy coping skills  Outcome: Progressing  Goal: Demonstrates healthy coping skills  Outcome: Progressing  Goal: Participates in unit activities  Description  Interventions:  - Provide therapeutic environment   - Provide required programming   - Redirect inappropriate behaviors   Outcome: Progressing  Goal: Patient/Family participate in treatment and DC plans  Description  Interventions:  - Provide therapeutic environment  Outcome: Progressing  Goal: Patient/Family verbalizes awareness of resources  Outcome: Progressing  Goal: Understands least restrictive measures  Description  Interventions:  - Utilize least restrictive behavior  Outcome: Progressing  Goal: Free from restraint events  Description  - Utilize least restrictive measures   - Provide behavioral interventions   - Redirect inappropriate behaviors   Outcome: Progressing     Problem: Risk for Self Injury/Neglect  Goal: Treatment Goal: Remain safe during length of stay, learn and adopt new coping skills, and be free of self-injurious ideation, impulses and acts at the time of discharge  Outcome: Progressing  Goal: Verbalize thoughts and feelings  Description  Interventions:  - Assess and re-assess patient's lethality and potential for self-injury  - Engage patient in 1:1 interactions, daily, for a minimum of 15 minutes  - Encourage patient to express feelings, fears, frustrations, hopes  - Establish rapport/trust with patient   Outcome: Progressing  Goal: Refrain from harming self  Description  Interventions:  - Monitor patient closely, per order  - Develop a trusting relationship  - Supervise medication ingestion, monitor effects and side effects   Outcome: Progressing  Goal: Attend and participate in unit activities, including therapeutic, recreational, and educational groups  Description  Interventions:  - Provide therapeutic and educational activities daily, encourage attendance and participation, and document same in the medical record  - Obtain collateral information, encourage visitation and family involvement in care   Outcome: Progressing  Goal: Recognize maladaptive responses and adopt new coping mechanisms  Outcome: Progressing  Goal: Complete daily ADLs, including personal hygiene independently, as able  Description  Interventions:  - Observe, teach, and assist patient with ADLS  - Monitor and promote a balance of rest/activity, with adequate nutrition and elimination  Outcome: Progressing     Problem: Depression  Goal: Treatment Goal: Demonstrate behavioral control of depressive symptoms, verbalize feelings of improved mood/affect, and adopt new coping skills prior to discharge  Outcome: Progressing  Goal: Verbalize thoughts and feelings  Description  Interventions:  - Assess and re-assess patient's level of risk   - Engage patient in 1:1 interactions, daily, for a minimum of 15 minutes   - Encourage patient to express feelings, fears, frustrations, hopes   Outcome: Progressing  Goal: Refrain from harming self  Description  Interventions:  - Monitor patient closely, per order   - Supervise medication ingestion, monitor effects and side effects   Outcome: Progressing  Goal: Refrain from isolation  Description  Interventions:  - Develop a trusting relationship   - Encourage socialization   Outcome: Progressing  Goal: Refrain from self-neglect  Outcome: Progressing  Goal: Attend and participate in unit activities, including therapeutic, recreational, and educational groups  Description  Interventions:  - Provide therapeutic and educational activities daily, encourage attendance and participation, and document same in the medical record   Outcome: Progressing  Goal: Complete daily ADLs, including personal hygiene independently, as able  Description  Interventions:  - Observe, teach, and assist patient with ADLS  -  Monitor and promote a balance of rest/activity, with adequate nutrition and elimination   Outcome: Progressing     Problem: Anxiety  Goal: Anxiety is at manageable level  Description  Interventions:  - Assess and monitor patient's anxiety level  - Monitor for signs and symptoms of anxiety both physical and emotional (heart palpitations, chest pain, shortness of breath, headaches, nausea, feeling jumpy, restlessness, irritable, apprehensive)  - Collaborate with interdisciplinary team and initiate plan and interventions as ordered    - [place] patient to unit/surroundings  - Explain treatment plan  - Encourage participation in care  - Encourage verbalization of concerns/fears  - Identify coping mechanisms  - Assist in developing anxiety-reducing skills  - Administer/offer alternative therapies  - Limit or eliminate stimulants  Outcome: Progressing     Problem: DISCHARGE PLANNING - CARE MANAGEMENT  Goal: Discharge to post-acute care or home with appropriate resources  Description  INTERVENTIONS:  - Conduct assessment to determine patient/family and health care team treatment goals, and need for post-acute services based on payer coverage, community resources, and patient preferences, and barriers to discharge  - Address psychosocial, clinical, and financial barriers to discharge as identified in assessment in conjunction with the patient/family and health care team  - Arrange appropriate level of post-acute services according to patients   needs and preference and payer coverage in collaboration with the physician and health care team  - Communicate with and update the patient/family, physician, and health care team regarding progress on the discharge plan  - Arrange appropriate transportation to post-acute venues  Outcome: Progressing     Problem: Prexisting or High Potential for Compromised Skin Integrity  Goal: Skin integrity is maintained or improved  Description  INTERVENTIONS:  - Identify patients at risk for skin breakdown  - Assess and monitor skin integrity  - Assess and monitor nutrition and hydration status  - Monitor labs (i e  albumin)  - Assess for incontinence   - Turn and reposition patient  - Assist with mobility/ambulation  - Relieve pressure over bony prominences  - Avoid friction and shearing  - Provide appropriate hygiene as needed including keeping skin clean and dry  - Evaluate need for skin moisturizer/barrier cream  - Collaborate with interdisciplinary team (i e  Nutrition, Rehabilitation, etc )   - Patient/family teaching  Outcome: Progressing

## 2019-06-01 NOTE — PROGRESS NOTES
Pt is isolative to self in room, comes out for meals  Pt is pleasant and cooperative on approach  Pt is on 3L continuous O2  Pt attempts to manipulate staff into bringing her an O2 tank  Pt came out to nurse's station to use the phone  Pt was on the phone for approx  20 minutes without O2  RN checked pt's pulse ox , which was 92% on room air  Pt was shown this on the monitor  Will continue to monitor

## 2019-06-01 NOTE — PROGRESS NOTES
Patient started to continuously use the call bell and yell from her bed her needs  Explained to the patient that she should come to the nurse's station if she needs something unless emergent situations  Patient did approach the nurses stations for needs  Patient continues to be somatic and poor insight  Vitals are stable  Denies any pain  Patient is currently in her bed  Appears to be sleeping  No signs of distress or discomfort noted  Will continue to monitor on q 7 minute checks

## 2019-06-01 NOTE — PROGRESS NOTES
Pt called hospital  to report that staff is "restraining her from using her oxygen"  While RN was speaking with , pt lying in bed smiling, waving to RN  Pt educated on appropriate phone use

## 2019-06-02 PROCEDURE — 99232 SBSQ HOSP IP/OBS MODERATE 35: CPT | Performed by: PSYCHIATRY & NEUROLOGY

## 2019-06-02 RX ADMIN — FLUTICASONE FUROATE AND VILANTEROL TRIFENATATE 1 PUFF: 200; 25 POWDER RESPIRATORY (INHALATION) at 08:35

## 2019-06-02 RX ADMIN — LEVOTHYROXINE SODIUM 125 MCG: 125 TABLET ORAL at 05:51

## 2019-06-02 RX ADMIN — PANTOPRAZOLE SODIUM 40 MG: 40 TABLET, DELAYED RELEASE ORAL at 05:51

## 2019-06-02 RX ADMIN — PALIPERIDONE 6 MG: 6 TABLET, FILM COATED, EXTENDED RELEASE ORAL at 08:35

## 2019-06-02 RX ADMIN — THEOPHYLLINE ANHYDROUS 200 MG: 200 CAPSULE, EXTENDED RELEASE ORAL at 08:35

## 2019-06-02 NOTE — PLAN OF CARE
Problem: Alteration in Thoughts and Perception  Goal: Treatment Goal: Gain control of psychotic behaviors/thinking, reduce/eliminate presenting symptoms and demonstrate improved reality functioning upon discharge  Outcome: Progressing  Goal: Verbalize thoughts and feelings  Description  Interventions:  - Promote a nonjudgmental and trusting relationship with the patient through active listening and therapeutic communication  - Assess patient's level of functioning, behavior and potential for risk  - Engage patient in 1 on 1 interactions for a minimum of 15 minutes each session  - Encourage patient to express fears, feelings, frustrations, and discuss symptoms    - [place] patient to reality, help patient recognize reality-based thinking   - Administer medications as ordered and assess for potential side effects  - Provide the patient education related to the signs and symptoms of the illness and desired effects of prescribed medications  Outcome: Progressing  Goal: Refrain from acting on delusional thinking/internal stimuli  Description  Interventions:  - Monitor patient closely, per order   - Utilize least restrictive measures   - Set reasonable limits, give positive feedback for acceptable   - Administer medications as ordered and monitor of potential side effects  Outcome: Progressing  Goal: Agree to be compliant with medication regime, as prescribed and report medication side effects  Description  Interventions:  - Offer appropriate PRN medication and supervise ingestion; conduct aims, as needed   Outcome: Progressing  Goal: Attend and participate in unit activities, including therapeutic, recreational, and educational groups  Description  Interventions:  - Provide therapeutic and educational activities daily, encourage attendance and participation, and document same in the medical record   Outcome: Progressing  Goal: Recognize dysfunctional thoughts, communicate reality-based thoughts at the time of discharge  Description  Interventions:  - Provide medication and psycho-education to assist patient in compliance and developing insight into his/her illness   Outcome: Progressing  Goal: Complete daily ADLs, including personal hygiene independently, as able  Description  Interventions:  - Observe, teach, and assist patient with ADLS  - Monitor and promote a balance of rest/activity, with adequate nutrition and elimination   Outcome: Progressing     Problem: Ineffective Coping  Goal: Identifies ineffective coping skills  Outcome: Progressing  Goal: Identifies healthy coping skills  Outcome: Progressing  Goal: Demonstrates healthy coping skills  Outcome: Progressing  Goal: Participates in unit activities  Description  Interventions:  - Provide therapeutic environment   - Provide required programming   - Redirect inappropriate behaviors   Outcome: Progressing  Goal: Patient/Family participate in treatment and DC plans  Description  Interventions:  - Provide therapeutic environment  Outcome: Progressing  Goal: Patient/Family verbalizes awareness of resources  Outcome: Progressing  Goal: Understands least restrictive measures  Description  Interventions:  - Utilize least restrictive behavior  Outcome: Progressing  Goal: Free from restraint events  Description  - Utilize least restrictive measures   - Provide behavioral interventions   - Redirect inappropriate behaviors   Outcome: Progressing     Problem: Risk for Self Injury/Neglect  Goal: Treatment Goal: Remain safe during length of stay, learn and adopt new coping skills, and be free of self-injurious ideation, impulses and acts at the time of discharge  Outcome: Progressing  Goal: Verbalize thoughts and feelings  Description  Interventions:  - Assess and re-assess patient's lethality and potential for self-injury  - Engage patient in 1:1 interactions, daily, for a minimum of 15 minutes  - Encourage patient to express feelings, fears, frustrations, hopes  - Establish rapport/trust with patient   Outcome: Progressing  Goal: Refrain from harming self  Description  Interventions:  - Monitor patient closely, per order  - Develop a trusting relationship  - Supervise medication ingestion, monitor effects and side effects   Outcome: Progressing  Goal: Attend and participate in unit activities, including therapeutic, recreational, and educational groups  Description  Interventions:  - Provide therapeutic and educational activities daily, encourage attendance and participation, and document same in the medical record  - Obtain collateral information, encourage visitation and family involvement in care   Outcome: Progressing  Goal: Recognize maladaptive responses and adopt new coping mechanisms  Outcome: Progressing  Goal: Complete daily ADLs, including personal hygiene independently, as able  Description  Interventions:  - Observe, teach, and assist patient with ADLS  - Monitor and promote a balance of rest/activity, with adequate nutrition and elimination  Outcome: Progressing     Problem: Depression  Goal: Treatment Goal: Demonstrate behavioral control of depressive symptoms, verbalize feelings of improved mood/affect, and adopt new coping skills prior to discharge  Outcome: Progressing  Goal: Verbalize thoughts and feelings  Description  Interventions:  - Assess and re-assess patient's level of risk   - Engage patient in 1:1 interactions, daily, for a minimum of 15 minutes   - Encourage patient to express feelings, fears, frustrations, hopes   Outcome: Progressing  Goal: Refrain from harming self  Description  Interventions:  - Monitor patient closely, per order   - Supervise medication ingestion, monitor effects and side effects   Outcome: Progressing  Goal: Refrain from isolation  Description  Interventions:  - Develop a trusting relationship   - Encourage socialization   Outcome: Progressing  Goal: Refrain from self-neglect  Outcome: Progressing  Goal: Attend and participate in unit activities, including therapeutic, recreational, and educational groups  Description  Interventions:  - Provide therapeutic and educational activities daily, encourage attendance and participation, and document same in the medical record   Outcome: Progressing  Goal: Complete daily ADLs, including personal hygiene independently, as able  Description  Interventions:  - Observe, teach, and assist patient with ADLS  -  Monitor and promote a balance of rest/activity, with adequate nutrition and elimination   Outcome: Progressing     Problem: Anxiety  Goal: Anxiety is at manageable level  Description  Interventions:  - Assess and monitor patient's anxiety level  - Monitor for signs and symptoms of anxiety both physical and emotional (heart palpitations, chest pain, shortness of breath, headaches, nausea, feeling jumpy, restlessness, irritable, apprehensive)  - Collaborate with interdisciplinary team and initiate plan and interventions as ordered    - [place] patient to unit/surroundings  - Explain treatment plan  - Encourage participation in care  - Encourage verbalization of concerns/fears  - Identify coping mechanisms  - Assist in developing anxiety-reducing skills  - Administer/offer alternative therapies  - Limit or eliminate stimulants  Outcome: Progressing     Problem: DISCHARGE PLANNING - CARE MANAGEMENT  Goal: Discharge to post-acute care or home with appropriate resources  Description  INTERVENTIONS:  - Conduct assessment to determine patient/family and health care team treatment goals, and need for post-acute services based on payer coverage, community resources, and patient preferences, and barriers to discharge  - Address psychosocial, clinical, and financial barriers to discharge as identified in assessment in conjunction with the patient/family and health care team  - Arrange appropriate level of post-acute services according to patients   needs and preference and payer coverage in collaboration with the physician and health care team  - Communicate with and update the patient/family, physician, and health care team regarding progress on the discharge plan  - Arrange appropriate transportation to post-acute venues  Outcome: Progressing     Problem: Prexisting or High Potential for Compromised Skin Integrity  Goal: Skin integrity is maintained or improved  Description  INTERVENTIONS:  - Identify patients at risk for skin breakdown  - Assess and monitor skin integrity  - Assess and monitor nutrition and hydration status  - Monitor labs (i e  albumin)  - Assess for incontinence   - Turn and reposition patient  - Assist with mobility/ambulation  - Relieve pressure over bony prominences  - Avoid friction and shearing  - Provide appropriate hygiene as needed including keeping skin clean and dry  - Evaluate need for skin moisturizer/barrier cream  - Collaborate with interdisciplinary team (i e  Nutrition, Rehabilitation, etc )   - Patient/family teaching  Outcome: Progressing

## 2019-06-02 NOTE — PROGRESS NOTES
[address] [facility] Geriatric Psychiatry  Psychiatrists  Progress Note    Patient Name: Junior Romero  MRN: [id number]  DOS: 06/01/2019    Chief Complaint:  paranoid delusions    Interval History: Patient has been manipulative and requiring frequent limit setting  She called the  today claiming RN is preventing her from getting oxygen  Per RN, patient maintained good O2 saturation even after 20 min of sitting at the desk phone without oxygen  Patient remains paranoid about the residence she was at and refuses to return there  She states there was "sodium bicarbonate" in the air somehow and this was toxic to her  She states her half sister is plotting to ruin her life  Mental Status Exam [Per above +]  Appearance: fair grooming/hygiene; no abnormal involuntary movements noted  Behavior: superficially related  Speech: wnl but pressured  Mood: anxious  Affect: constricted  Thought process: illogical  Thought content: paranoid delusions, somatic delusions; denies SI/HI  Perceptual disturbances: denies AH/VH  Cognition: oriented to all domains     Insight: poor  Judgement: poor        Current Facility-Administered Medications:     acetaminophen (TYLENOL) tablet 650 mg, 650 mg, Oral, Q6H PRN, Yana Werner MD    albuterol (PROVENTIL HFA,VENTOLIN HFA) inhaler 2 puff, 2 puff, Inhalation, Q4H PRN, ABILIO Trivedi    aluminum-magnesium hydroxide-simethicone (MYLANTA) 200-200-20 mg/5 mL oral suspension 15 mL, 15 mL, Oral, Q4H PRN, Ruddy Jorge MD, 15 mL at 05/12/19 2221    ammonium lactate (LAC-HYDRIN) 12 % lotion, , Topical, BID PRN, ABILIO Trivedi, 1 application at 66/02/51 1114    benzonatate (TESSALON PERLES) capsule 100 mg, 100 mg, Oral, TID PRN, Yana Werner MD    benztropine (COGENTIN) injection 1 mg, 1 mg, Intramuscular, Q8H PRN, Ruddy Jorge MD    benztropine (COGENTIN) tablet 1 mg, 1 mg, Oral, Q8H PRN, MD Riky Dosswell enoxaparin (LOVENOX) subcutaneous injection 40 mg, 40 mg, Subcutaneous, Daily, James Stewart MD    fluticasone-vilanterol (BREO ELLIPTA) 200-25 MCG/INH inhaler 1 puff, 1 puff, Inhalation, Daily, Carolin Whiteside MD, 1 puff at 06/01/19 0833    haloperidol (HALDOL) oral concentrated solution 1 mg, 1 mg, Oral, Q6H PRN, Tito Gibbs MD    haloperidol lactate (HALDOL) injection 2 mg, 2 mg, Intramuscular, Q6H PRN, Tito Gibbs MD    hydrOXYzine HCL (ATARAX) tablet 25 mg, 25 mg, Oral, Q6H PRN, Tito Gibbs MD, 25 mg at 05/29/19 1326    levothyroxine tablet 125 mcg, 125 mcg, Oral, Early Morning, James Stewart MD, 125 mcg at 06/01/19 0506    magnesium hydroxide (MILK OF MAGNESIA) 400 mg/5 mL oral suspension 30 mL, 30 mL, Oral, Daily PRN, Nimo Hanna MD    paliperidone (INVEGA) 24 hr tablet 6 mg, 6 mg, Oral, Daily, Deanna Comfort, CRNP, 6 mg at 06/01/19 0833    pantoprazole (PROTONIX) EC tablet 40 mg, 40 mg, Oral, Early Morning, James Stewart MD, 40 mg at 06/01/19 0506    polyethylene glycol (MIRALAX) packet 17 g, 17 g, Oral, Daily PRN, Deanna Comfort, CRNP    risperiDONE (RisperDAL M-TABS) dispersible tablet 1 mg, 1 mg, Oral, Q8H PRN, Tito Gibbs MD    theophylline (JEF-24) 24 hr capsule 200 mg, 200 mg, Oral, Daily, James Stewart MD, 200 mg at 06/01/19 2996    traZODone (DESYREL) tablet 25 mg, 25 mg, Oral, HS PRN, James Stewart MD    tuberculin injection 5 Units, 5 Units, Intradermal, Once, Neal Hobson MD, Stopped at 05/22/19 1425    Vitals:    06/01/19 2152   BP: 103/60   Pulse: 101   Resp: 16   Temp: 98 6 °F (37 °C)   SpO2: 98%       Lab/work up: no new labs    Assessment:     Axis I:  · Schizoaffective disorder; has been treatment resistant  Axis II:  · OCPD  · Cluster B traits  Axis III:  - COPD with asthma, stable compression fracture of L4   Axis IV:  · Limited social support, chronically homeless, financial problems, on disability, noncompliant with treatment    Progress: limited    Plan:   1  Disposition: Patient meets criteria for ongoing acute inpatient treatment due to being danger to self 2/2 psychosis and poor self care  Patient will be discharged when there is an absence of psychosis [id number]  2  Legal status: 304  3  Psychopharmacologic interventions:   - continue invega 6mg po daily  - Continue to monitor psychosis  4  Medical comorbidities: Hospitalist following PRN; refused PPD  5  Other therapies: Individual/group/milieu therapy as appropriate   6  Social issues: Case management following to arrange disposition - will pursue state hospitalization  7   Precautions: Routine q15min checks, vitals qshift    Juan A Blas MD  06/01/2019

## 2019-06-02 NOTE — PROGRESS NOTES
Patient remains isolative to her room  She continues to yell and call out for "lists of things she needs "  She continues to try and manipulate staff  She is fixated on her blood pressure stating it is low  Vitals are stable and at patient's baseline  Blood pressure was taken and recorded  Limits set with patient  Patient is in her bed  Shows no signs of distress  Will continue to monitor on q 7 minute checks

## 2019-06-02 NOTE — PROGRESS NOTES
Pt encouraged to shower, but refused  Education given to pt about health benefits of showering, and consequences of poor hygiene  Will continue to encourage independent personal hygiene for a healthy recovery

## 2019-06-02 NOTE — PROGRESS NOTES
[address] [facility] Geriatric Psychiatry  Psychiatrists  Progress Note    Patient Name: Estela Chung  MRN: [id number]  DOS: 06/02/19    Chief Complaint:  paranoid delusions    Interval History: Patient continues to obsess about somatic issues  She refuses to move beds to allow more independent toileting while keeping access to oxygen - she does not provide a logical reason for this refusal  Writer confronted her about the observation from staff that she saturates well after spending 20 min without oxygen - she simply states this is not true  Refused to shower  Mental Status Exam [Per above +]  Appearance: disheveled; poor hygiene; no abnormal involuntary movements noted   Behavior: uncooperative, argumentative  Speech: pressured  Mood: anxious  Affect: irritable, constricted  Thought process: illogical, loosely associated, tangential  Thought content: somatic preoccupation and paranoid delusions are ongoing  Perceptual disturbances: denies AH/VH  Cognition: oriented to all domains    Insight: poor  Judgement: poor        Current Facility-Administered Medications:     acetaminophen (TYLENOL) tablet 650 mg, 650 mg, Oral, Q6H PRN, Nate Crowley MD    albuterol (PROVENTIL HFA,VENTOLIN HFA) inhaler 2 puff, 2 puff, Inhalation, Q4H PRN, ABILIO Pizarro    aluminum-magnesium hydroxide-simethicone (MYLANTA) 200-200-20 mg/5 mL oral suspension 15 mL, 15 mL, Oral, Q4H PRN, Lynn Martinez MD, 15 mL at 05/12/19 2221    ammonium lactate (LAC-HYDRIN) 12 % lotion, , Topical, BID PRN, ABILIO Pizarro, 1 application at 51/59/20 1114    benzonatate (TESSALON PERLES) capsule 100 mg, 100 mg, Oral, TID PRN, Nate Crowley MD    benztropine (COGENTIN) injection 1 mg, 1 mg, Intramuscular, Q8H PRN, Lynn Martinez MD    benztropine (COGENTIN) tablet 1 mg, 1 mg, Oral, Q8H PRN, Lynn Martinez MD    enoxaparin (LOVENOX) subcutaneous injection 40 mg, 40 mg, Subcutaneous, Daily, Mandie Anderson MD    fluticasone-vilanterol (BREO ELLIPTA) 200-25 MCG/INH inhaler 1 puff, 1 puff, Inhalation, Daily, Steven Long MD, 1 puff at 06/02/19 0835    haloperidol (HALDOL) oral concentrated solution 1 mg, 1 mg, Oral, Q6H PRN, Virginia Rivera MD    haloperidol lactate (HALDOL) injection 2 mg, 2 mg, Intramuscular, Q6H PRN, Virginia Rivera MD    hydrOXYzine HCL (ATARAX) tablet 25 mg, 25 mg, Oral, Q6H PRN, Virginia Rivera MD, 25 mg at 05/29/19 1326    levothyroxine tablet 125 mcg, 125 mcg, Oral, Early Morning, Mandie Anderson MD, 125 mcg at 06/02/19 0551    magnesium hydroxide (MILK OF MAGNESIA) 400 mg/5 mL oral suspension 30 mL, 30 mL, Oral, Daily PRN, Maco Doan MD    paliperidone (INVEGA) 24 hr tablet 6 mg, 6 mg, Oral, Daily, ABILIO Hayward, 6 mg at 06/02/19 0835    pantoprazole (PROTONIX) EC tablet 40 mg, 40 mg, Oral, Early Morning, Mandie Anderson MD, 40 mg at 06/02/19 0551    polyethylene glycol (MIRALAX) packet 17 g, 17 g, Oral, Daily PRN, ABILIO Hayward    risperiDONE (RisperDAL M-TABS) dispersible tablet 1 mg, 1 mg, Oral, Q8H PRN, Virginia Rivera MD    theophylline (JFE-24) 24 hr capsule 200 mg, 200 mg, Oral, Daily, Mandie Anderson MD, 200 mg at 06/02/19 0835    traZODone (DESYREL) tablet 25 mg, 25 mg, Oral, HS PRN, Mandie Anderson MD    tuberculin injection 5 Units, 5 Units, Intradermal, Once, Elida Pa MD, Stopped at 05/22/19 1425    Vitals:    06/02/19 1507   BP: 104/67   Pulse: 80   Resp: 18   Temp: (!) 96 9 °F (36 1 °C)   SpO2: 97%       Lab/work up: no new labs    Assessment:     Axis I:  · Schizoaffective disorder; has been treatment resistant  Axis II:  · OCPD  · Cluster B traits  Axis III:  - COPD with asthma, stable compression fracture of L4   Axis IV:  · Limited social support, chronically homeless, financial problems, on disability, noncompliant with treatment    Progress: limited    Plan:   1   Disposition: Patient meets criteria for ongoing acute inpatient treatment due to being danger to self 2/2 psychosis and poor self care  Patient will be discharged when there is an absence of psychosis [id number]  2  Legal status: 304  3  Psychopharmacologic interventions:   - increase invega to 9mg po daily  - Continue to monitor psychosis  4  Medical comorbidities: Hospitalist following PRN; refused PPD  5  Other therapies: Individual/group/milieu therapy as appropriate   6  Social issues: Case management following to arrange disposition - will pursue state hospitalization  7   Precautions: Routine q15min checks, vitals qshift    Marly Ariza MD  06/02/19

## 2019-06-02 NOTE — PROGRESS NOTES
Pt came out to chair near nurse's station to verbalize her complaints  Pt complaining that staff is not "certified to make adjustments to my oxygen"  Pt also states that she's so upset that "now my blood pressure is very low and my vitals are unstable"  Pt's vital signs were taken and within normal limits and pt returned to her bed

## 2019-06-03 PROCEDURE — 94761 N-INVAS EAR/PLS OXIMETRY MLT: CPT

## 2019-06-03 PROCEDURE — 99232 SBSQ HOSP IP/OBS MODERATE 35: CPT | Performed by: PSYCHIATRY & NEUROLOGY

## 2019-06-03 PROCEDURE — 93005 ELECTROCARDIOGRAM TRACING: CPT

## 2019-06-03 PROCEDURE — 97530 THERAPEUTIC ACTIVITIES: CPT

## 2019-06-03 RX ADMIN — THEOPHYLLINE ANHYDROUS 200 MG: 200 CAPSULE, EXTENDED RELEASE ORAL at 09:11

## 2019-06-03 RX ADMIN — LEVOTHYROXINE SODIUM 125 MCG: 125 TABLET ORAL at 06:13

## 2019-06-03 RX ADMIN — PANTOPRAZOLE SODIUM 40 MG: 40 TABLET, DELAYED RELEASE ORAL at 06:13

## 2019-06-03 RX ADMIN — FLUTICASONE FUROATE AND VILANTEROL TRIFENATATE 1 PUFF: 200; 25 POWDER RESPIRATORY (INHALATION) at 09:16

## 2019-06-03 NOTE — NURSING NOTE
Patient needs encouragement to be OOB  She is cooperative with care  Nasal cannula in place, O2 3L  Denies SOB, no wheezes noticed  Denies pain  Patient is now at the day room  No further complaints   Will continue to monitor per protocol

## 2019-06-03 NOTE — PROGRESS NOTES
06/03/19 1042   Team Meeting   Meeting Type Daily Rounds   Team Members Present   Team Members Present Physician;Nurse;Occupational Therapist;   Physician Team Member Dr Regina Downey Team Member [address] [place] [address] Management Team Member Danika July   ROSA MARIA Team Member Franky Mcclendon    Patient/Family Present   Patient Present No   Patient's Family Present No     Patient continue to have poor insight into her mental health  Patient continues to be somatic and fixated regarding her oxygen needs  Patient remains somatic, isolative, and delusional  Patient is refusing higher dose of Invega  Plan continues to be [facility] however, MD will order oxygen evaluation to see if patient is able to require less oxygen so she is able to go to [facility] to reach out to Jayce DURAND to discuss case prior to making referral

## 2019-06-03 NOTE — PROGRESS NOTES
Pt anxious, somatic and obsessive but responded to verbal support  Good appetite at breakfast, refused lunch  Pt denied SI  Isolative, did not attend group  Pt refused lovenox, SLIM notified  Pox 97% with O2 at 3 liters/min via nc  Monitored for safety and support

## 2019-06-03 NOTE — PROGRESS NOTES
[facility]  Psychiatrists  Progress Note    Patient Name: Jasmeet Bone  MRN: [id number]  DOS: 06/03/19    Chief Complaint:  paranoid delusions    Interval History: Patient refused to take invega 9mg this morning, being concerned about her blood pressure being low - it was 87/48 while supine  Writer reassured her that she can continue taking it  She remains disorganized, illogical  She seems to think she can't live independently due to requiring oxygen and stating that she can't cook for herself, shower on her own, or clean        Mental Status Exam [Per above +]  Appearance: disheveled, poor grooming; no abnormal involuntary movements noted  Behavior: remains argumentative and oppositional, frequently devaluing stff  Speech: pressured  Mood: anxious  Affect: constricted, stable  Thought process: illogical  Thought content: somatically preoccupied, remains paranoid; denies SI/HI  Perceptual disturbances: denies AH/VH  Cognition: oriented to all domains  Insight: poor  Judgement: poor      Current Facility-Administered Medications:     acetaminophen (TYLENOL) tablet 650 mg, 650 mg, Oral, Q6H PRN, Codi Ortiz MD    albuterol (PROVENTIL HFA,VENTOLIN HFA) inhaler 2 puff, 2 puff, Inhalation, Q4H PRN, ABILIO Garcia    aluminum-magnesium hydroxide-simethicone (MYLANTA) 200-200-20 mg/5 mL oral suspension 15 mL, 15 mL, Oral, Q4H PRN, Merissa Hamlin MD, 15 mL at 05/12/19 2221    ammonium lactate (LAC-HYDRIN) 12 % lotion, , Topical, BID PRN, ABILIO Garcia, 1 application at 92/30/26 1114    benzonatate (TESSALON PERLES) capsule 100 mg, 100 mg, Oral, TID PRN, Codi Ortiz MD    benztropine (COGENTIN) injection 1 mg, 1 mg, Intramuscular, Q8H PRN, Merissa Hamlin MD    benztropine (COGENTIN) tablet 1 mg, 1 mg, Oral, Q8H PRN, Merissa Hamlin MD    enoxaparin (LOVENOX) subcutaneous injection 40 mg, 40 mg, Subcutaneous, Daily, Zeeshan Taylor MD    fluticasone-vilanterol (BREO ELLIPTA) 200-25 MCG/INH inhaler 1 puff, 1 puff, Inhalation, Daily, Jane Boogie MD, 1 puff at 06/03/19 0916    haloperidol (HALDOL) oral concentrated solution 1 mg, 1 mg, Oral, Q6H PRN, Pretty Lopez MD    haloperidol lactate (HALDOL) injection 2 mg, 2 mg, Intramuscular, Q6H PRN, Pretty Lopez MD    hydrOXYzine HCL (ATARAX) tablet 25 mg, 25 mg, Oral, Q6H PRN, Pretty Lopez MD, 25 mg at 05/29/19 1326    levothyroxine tablet 125 mcg, 125 mcg, Oral, Early Morning, Zeeshan Taylor MD, 125 mcg at 06/03/19 6511    magnesium hydroxide (MILK OF MAGNESIA) 400 mg/5 mL oral suspension 30 mL, 30 mL, Oral, Daily PRN, Mihai Glover MD    paliperidone (INVEGA) 24 hr tablet 9 mg, 9 mg, Oral, Daily, Chelle Heredia MD    pantoprazole (PROTONIX) EC tablet 40 mg, 40 mg, Oral, Early Morning, Zeeshan Taylor MD, 40 mg at 06/03/19 7363    polyethylene glycol (MIRALAX) packet 17 g, 17 g, Oral, Daily PRN, ABILIO Clarke    risperiDONE (RisperDAL M-TABS) dispersible tablet 1 mg, 1 mg, Oral, Q8H PRN, Pretty Lopez MD    theophylline (JEF-24) 24 hr capsule 200 mg, 200 mg, Oral, Daily, Zeeshan Taylor MD, 200 mg at 06/03/19 0911    traZODone (DESYREL) tablet 25 mg, 25 mg, Oral, HS PRN, Zeeshan Taylor MD    tuberculin injection 5 Units, 5 Units, Intradermal, Once, Chelle Heredia MD, Stopped at 05/22/19 1425    Vitals:    06/03/19 0853   BP: 103/63   Pulse: 95   Resp: 20   Temp:    SpO2:        Lab/work up: no new labs    Assessment:     Axis I:  · Schizoaffective disorder; has been treatment resistant  Axis II:  · OCPD  · Cluster B traits  Axis III:  - COPD with asthma, stable compression fracture of L4   Axis IV:  · Limited social support, chronically homeless, financial problems, on disability, noncompliant with treatment    Progress: limited    Plan:   1   Disposition: Patient meets criteria for ongoing acute inpatient treatment due to being danger to self 2/2 psychosis and poor self care  Patient will be discharged when there is an absence of psychosis [id number]  2  Legal status: 304  3  Psychopharmacologic interventions:   - continue invega 9mg po daily  - Continue to monitor psychosis  4  Medical comorbidities: Hospitalist following PRN; refused PPD  5  Other therapies: Individual/group/milieu therapy as appropriate   6  Social issues: Case management following to arrange disposition - will pursue state hospitalization  7   Precautions: Routine q15min checks, vitals qshift    Tegan Oakes MD  06/03/19

## 2019-06-03 NOTE — OCCUPATIONAL THERAPY NOTE
Occupational Therapy Activity Treatment Note      Avni Garnett    6/3/2019    Patient Active Problem List   Diagnosis    Sepsis ([facility]    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[facility] )    Tobacco use disorder, continuous    Bipolar disorder ([facility] )    Left hip pain    Lactic acidosis    Hypokalemia    Hypomagnesemia    Compression fracture of L4 lumbar vertebra    Thoracic compression fracture (HCC)    Ventral hernia    Parapneumonic effusion    Acute on chronic respiratory failure with hypoxia (HCC)    Chronic respiratory failure (HCC)    Hypophosphatemia    Elevated MCV    Compression deformity of vertebra    Schizoaffective disorder, bipolar type ([facility] )    Acquired hypothyroidism    Gastroesophageal reflux disease without esophagitis    Abnormal CT of the chest    Excessive cerumen in left ear canal    Lipoma of right upper extremity    Polydipsia    Localized swelling of both lower legs       Past Medical History:   Diagnosis Date    Acid reflux     Anxiety     Asthma     Bipolar 1 disorder (HCC)     Chronic pain disorder     Chronic respiratory failure (HCC)     Compression fracture of fourth lumbar vertebra ([facility]     COPD (chronic obstructive pulmonary disease) (HCC)     Depression     GERD (gastroesophageal reflux disease)     History of home oxygen therapy     Hypothyroidism     Lipoma of upper extremity     Psychiatric illness     Schizoaffective disorder ([facility] )     Substance abuse ([facility] )     Nicotine    Thoracic compression fracture ([facility] )     Ventral hernia        No past surgical history on file  06/03/19 1600   Assessment   Assessment Patsy was pleasant, personable at the start of this activity session  She appeared happy about her O2 being lowered to 2L, she talked about her experiences with respiratory  She was presented with pictures to work with (felt fuzzy pictures), and she did look through these although she also wanted to engage in game activity Karen Henriquez)   She was pleasant, in good spirits, she conversed in a free give and take manner, she was attentive to details and rules  She was later in the session approached by her doctor, she talked to her doctor about why she did not take her medication earlier on this day  She started to talk about her concern that her blood pressure was low  After her doctor left, she did appear more preoccupied,  She did talk about her concern that she has low blood pressure  However, she did appear to continue to enjoy the activity session, she did request an additional NILES game and did request to learn Skipbo  Progress has been consistent when involved in OT program  Continue to meet with Gillian Mcginnis in OT activity program to promote calm reality based interactions and cooperation with treatment regime  Plan   Treatment Interventions ADL retraining; Endurance training;Continued evaluation; Activityengagement  (coping skills instruction, life management, reality focus)   Goal Expiration Date 06/06/19   Treatment Day 28   OT Frequency 2-3x/wk   Fior Freire OT

## 2019-06-03 NOTE — RESPIRATORY THERAPY NOTE
Home Oxygen Qualifying Test       Patient name: Breanna Cooper        : 1955   Date of Test:  Ute 3, 2019  Diagnosis:      Home Oxygen Test:    **Medicare Guidelines require item(s) 1-5 on all ambulatory patients or 1 and 2 on non-ambulatory patients  1   Baseline SPO2 on Room Air at rest 90 %  2   SPO2 during exercise on Room Air 85 %  During exercise monitor SpO2  If SPO2 increases >=89% with ambulation do not add supplemental             oxygen  If <= 88% on room air add O2 via NC and titrate patient  Patient must be ambulated with O2 and titrated to > 88% with exertion  3   SPO2 on Oxygen at rest  92% 2 lpm     4   SPO2 during exercise on Oxygen 89 % a liter flow of 2 lpm     5   Exercise performed:          walking          [x]  Supplemental Home Oxygen is indicated  []  Client does not qualify for home oxygen  Respiratory Additional Notes- pt does need o2 when ambulating  Suggest to keep oxygen on 2l/m cont so pt becomes more mobile      Hector Oar Jessica, RT

## 2019-06-03 NOTE — PLAN OF CARE
Problem: OCCUPATIONAL THERAPY ADULT  Goal: Performs self-care activities at highest level of function for planned discharge setting  See evaluation for individualized goals  Description  Treatment Interventions: ADL retraining, Endurance training, Continued evaluation, Activityengagement(coping skills instruction, life management, reality focus)          See flowsheet documentation for full assessment, interventions and recommendations  Outcome: Progressing  Note:   Limitation: Decreased ADL status, Decreased high-level ADLs, Mood limitation(guarded, limited coping and life management skills)  Prognosis: Fair  Assessment: Patsy was pleasant, personable at the start of this activity session  She appeared happy about her O2 being lowered to 2L, she talked about her experiences with respiratory  She was presented with pictures to work with (felt fuzzy pictures), and she did look through these although she also wanted to engage in game activity Abner Zaragoza)  She was pleasant, in good spirits, she conversed in a free give and take manner, she was attentive to details and rules  She was later in the session approached by her doctor, she talked to her doctor about why she did not take her medication earlier on this day  She started to talk about her concern that her blood pressure was low  After her doctor left, she did appear more preoccupied,  She did talk about her concern that she has low blood pressure  However, she did appear to continue to enjoy the activity session, she did request an additional NILES game and did request to learn Skipbo  Progress has been consistent when involved in OT program  Continue to meet with Twyla Solis in OT activity program to promote calm reality based interactions and cooperation with treatment regime

## 2019-06-04 PROCEDURE — 99232 SBSQ HOSP IP/OBS MODERATE 35: CPT | Performed by: PSYCHIATRY & NEUROLOGY

## 2019-06-04 RX ADMIN — PANTOPRAZOLE SODIUM 40 MG: 40 TABLET, DELAYED RELEASE ORAL at 05:20

## 2019-06-04 RX ADMIN — PALIPERIDONE 9 MG: 3 TABLET, EXTENDED RELEASE ORAL at 08:59

## 2019-06-04 RX ADMIN — THEOPHYLLINE ANHYDROUS 200 MG: 200 CAPSULE, EXTENDED RELEASE ORAL at 08:59

## 2019-06-04 RX ADMIN — LEVOTHYROXINE SODIUM 125 MCG: 125 TABLET ORAL at 05:20

## 2019-06-04 RX ADMIN — FLUTICASONE FUROATE AND VILANTEROL TRIFENATATE 1 PUFF: 200; 25 POWDER RESPIRATORY (INHALATION) at 08:59

## 2019-06-04 NOTE — PROGRESS NOTES
[address] [facility]iat[facility]  Psychiatrists  Progress Note    Patient Name: Laina Manzanares  MRN: [id number]  DOS: 06/04/19    Chief Complaint:  paranoid delusions    Interval History: Patient agreed to take invega 9mg today  Patient is paranoid  Points to a lipoma on her arm and states it's a battery and tracking device that "my wifey poo" uses to make sure she knows where the patient is  Denies SI, AH  Mental Status Exam [Per above +]  Appearance: poor grooming/fair hygiene; no abnormal involuntary movements   Behavior: calm, superficially cooperative  Speech: wnl  Mood: euthymic  Affect: neutral, constricted, stable  Thought process: linear but illogical  Thought content: paranoid delusions elicited; denies SI/HI  Perceptual disturbances: denies AH/VH  Cognition: oriented to all domains     Insight: poor  Judgement: poor      Current Facility-Administered Medications:     acetaminophen (TYLENOL) tablet 650 mg, 650 mg, Oral, Q6H PRN, Ansley Baker MD    albuterol (PROVENTIL HFA,VENTOLIN HFA) inhaler 2 puff, 2 puff, Inhalation, Q4H PRN, ABILIO Barroso    aluminum-magnesium hydroxide-simethicone (MYLANTA) 200-200-20 mg/5 mL oral suspension 15 mL, 15 mL, Oral, Q4H PRN, Efren Montana MD, 15 mL at 05/12/19 2221    ammonium lactate (LAC-HYDRIN) 12 % lotion, , Topical, BID PRN, ABILIO Barroso, 1 application at 97/20/37 1114    benzonatate (TESSALON PERLES) capsule 100 mg, 100 mg, Oral, TID PRN, Ansley Baker MD    benztropine (COGENTIN) injection 1 mg, 1 mg, Intramuscular, Q8H PRN, Efren Montana MD    benztropine (COGENTIN) tablet 1 mg, 1 mg, Oral, Q8H PRN, Efren Montana MD    enoxaparin (LOVENOX) subcutaneous injection 40 mg, 40 mg, Subcutaneous, Daily, Ansley Baker MD    fluticasone-vilanterol (BREO ELLIPTA) 200-25 MCG/INH inhaler 1 puff, 1 puff, Inhalation, Daily, Benny Spatz, MD, 1 puff at 06/04/19 0859    haloperidol (HALDOL) oral concentrated solution 1 mg, 1 mg, Oral, Q6H PRN, Dia Ricks MD    haloperidol lactate (HALDOL) injection 2 mg, 2 mg, Intramuscular, Q6H PRN, Dia Ricks MD    hydrOXYzine HCL (ATARAX) tablet 25 mg, 25 mg, Oral, Q6H PRN, Dia Ricks MD, 25 mg at 05/29/19 1326    levothyroxine tablet 125 mcg, 125 mcg, Oral, Early Morning, Erik Schumacher MD, 125 mcg at 06/04/19 0520    magnesium hydroxide (MILK OF MAGNESIA) 400 mg/5 mL oral suspension 30 mL, 30 mL, Oral, Daily PRN, Shawn Connors MD    paliperidone (INVEGA) 24 hr tablet 9 mg, 9 mg, Oral, Daily, Deb Jansen MD, 9 mg at 06/04/19 0859    pantoprazole (PROTONIX) EC tablet 40 mg, 40 mg, Oral, Early Morning, Erik Schumacher MD, 40 mg at 06/04/19 0520    polyethylene glycol (MIRALAX) packet 17 g, 17 g, Oral, Daily PRN, ABILOI Rodríguez    risperiDONE (RisperDAL M-TABS) dispersible tablet 1 mg, 1 mg, Oral, Q8H PRN, Dia Ricks MD    theophylline (JEF-24) 24 hr capsule 200 mg, 200 mg, Oral, Daily, Erik Schumacher MD, 200 mg at 06/04/19 0859    traZODone (DESYREL) tablet 25 mg, 25 mg, Oral, HS PRN, Erik Schumacher MD    tuberculin injection 5 Units, 5 Units, Intradermal, Once, Deb Jansen MD, Stopped at 05/22/19 1425    Vitals:    06/04/19 1508   BP: 105/66   Pulse: 93   Resp: 16   Temp: 99 6 °F (37 6 °C)   SpO2: 95%       Lab/work up: no new labs    Assessment:     Axis I:  · Schizoaffective disorder; has been treatment resistant  Axis II:  · OCPD  · Cluster B traits  Axis III:  - COPD with asthma, stable compression fracture of L4   Axis IV:  · Limited social support, chronically homeless, financial problems, on disability, noncompliant with treatment    Progress: limited    Plan:   1  Disposition: Patient meets criteria for ongoing acute inpatient treatment due to being danger to self 2/2 psychosis and poor self care  Patient will be discharged when there is an absence of psychosis [id number]     2  Legal status: 304  3  Psychopharmacologic interventions:   - continue invega 9mg po daily  - Continue to monitor psychosis  4  Medical comorbidities: Hospitalist following PRN; refused PPD  5  Other therapies: Individual/group/milieu therapy as appropriate   6  Social issues: Case management following to arrange disposition - will pursue state hospitalization  7   Precautions: Routine q15min checks, vitals qshift    Lady Brendan MD  06/04/19

## 2019-06-04 NOTE — PROGRESS NOTES
Patient was calm and cooperative  She has )2 continuous at 2L/Minute  She continues to intimate that the doctors and [facility] know nothing and she attempts to dictate care   She remains mostly isolative but engages on approach although is paranoid and suspicious of care and caregivers

## 2019-06-04 NOTE — PROGRESS NOTES
Currently, patient appears to be resting in bed with eyes closed  No episodes of aggressive behavior noted  Patient continues with O2 @ 2 L  No s/s of respiratory distress observed  Will continue to monitor patient

## 2019-06-04 NOTE — PROGRESS NOTES
Pt spending more time out of her room  Accepted invega but refused lovenox, Dr Lacey Srinivasan notified  VSS  Good appetite and steady gait  Pox 97 with O2 @ 2 liters/min via nc  Monitored for safety and support

## 2019-06-05 LAB
ATRIAL RATE: 89 BPM
P AXIS: -17 DEGREES
PR INTERVAL: 150 MS
QRS AXIS: 125 DEGREES
QRSD INTERVAL: 90 MS
QT INTERVAL: 384 MS
QTC INTERVAL: 467 MS
T WAVE AXIS: -1 DEGREES
VENTRICULAR RATE: 89 BPM

## 2019-06-05 PROCEDURE — 93010 ELECTROCARDIOGRAM REPORT: CPT | Performed by: INTERNAL MEDICINE

## 2019-06-05 PROCEDURE — 99232 SBSQ HOSP IP/OBS MODERATE 35: CPT | Performed by: NURSE PRACTITIONER

## 2019-06-05 RX ORDER — HALOPERIDOL 2 MG/ML
1 SOLUTION ORAL EVERY 6 HOURS PRN
Status: DISCONTINUED | OUTPATIENT
Start: 2019-06-05 | End: 2019-07-16

## 2019-06-05 RX ORDER — HALOPERIDOL 5 MG/ML
2 INJECTION INTRAMUSCULAR EVERY 6 HOURS PRN
Status: DISCONTINUED | OUTPATIENT
Start: 2019-06-05 | End: 2019-07-16

## 2019-06-05 RX ADMIN — PANTOPRAZOLE SODIUM 40 MG: 40 TABLET, DELAYED RELEASE ORAL at 06:21

## 2019-06-05 RX ADMIN — THEOPHYLLINE ANHYDROUS 200 MG: 200 CAPSULE, EXTENDED RELEASE ORAL at 09:07

## 2019-06-05 RX ADMIN — PALIPERIDONE 9 MG: 3 TABLET, EXTENDED RELEASE ORAL at 09:07

## 2019-06-05 RX ADMIN — LEVOTHYROXINE SODIUM 125 MCG: 125 TABLET ORAL at 06:21

## 2019-06-05 RX ADMIN — FLUTICASONE FUROATE AND VILANTEROL TRIFENATATE 1 PUFF: 200; 25 POWDER RESPIRATORY (INHALATION) at 09:08

## 2019-06-05 NOTE — PLAN OF CARE
Problem: Alteration in Thoughts and Perception  Goal: Treatment Goal: Gain control of psychotic behaviors/thinking, reduce/eliminate presenting symptoms and demonstrate improved reality functioning upon discharge  Outcome: Progressing  Goal: Verbalize thoughts and feelings  Description  Interventions:  - Promote a nonjudgmental and trusting relationship with the patient through active listening and therapeutic communication  - Assess patient's level of functioning, behavior and potential for risk  - Engage patient in 1 on 1 interactions for a minimum of 15 minutes each session  - Encourage patient to express fears, feelings, frustrations, and discuss symptoms    - [place] patient to reality, help patient recognize reality-based thinking   - Administer medications as ordered and assess for potential side effects  - Provide the patient education related to the signs and symptoms of the illness and desired effects of prescribed medications  Outcome: Progressing  Goal: Refrain from acting on delusional thinking/internal stimuli  Description  Interventions:  - Monitor patient closely, per order   - Utilize least restrictive measures   - Set reasonable limits, give positive feedback for acceptable   - Administer medications as ordered and monitor of potential side effects  Outcome: Progressing  Goal: Agree to be compliant with medication regime, as prescribed and report medication side effects  Description  Interventions:  - Offer appropriate PRN medication and supervise ingestion; conduct aims, as needed   Outcome: Progressing  Goal: Recognize dysfunctional thoughts, communicate reality-based thoughts at the time of discharge  Description  Interventions:  - Provide medication and psycho-education to assist patient in compliance and developing insight into his/her illness   Outcome: Progressing  Goal: Complete daily ADLs, including personal hygiene independently, as able  Description  Interventions:  - Observe, teach, and assist patient with ADLS  - Monitor and promote a balance of rest/activity, with adequate nutrition and elimination   Outcome: Progressing     Problem: Risk for Self Injury/Neglect  Goal: Treatment Goal: Remain safe during length of stay, learn and adopt new coping skills, and be free of self-injurious ideation, impulses and acts at the time of discharge  Outcome: Progressing  Goal: Verbalize thoughts and feelings  Description  Interventions:  - Assess and re-assess patient's lethality and potential for self-injury  - Engage patient in 1:1 interactions, daily, for a minimum of 15 minutes  - Encourage patient to express feelings, fears, frustrations, hopes  - Establish rapport/trust with patient   Outcome: Progressing  Goal: Refrain from harming self  Description  Interventions:  - Monitor patient closely, per order  - Develop a trusting relationship  - Supervise medication ingestion, monitor effects and side effects   Outcome: Progressing  Goal: Recognize maladaptive responses and adopt new coping mechanisms  Outcome: Progressing  Goal: Complete daily ADLs, including personal hygiene independently, as able  Description  Interventions:  - Observe, teach, and assist patient with ADLS  - Monitor and promote a balance of rest/activity, with adequate nutrition and elimination  Outcome: Progressing     Problem: Depression  Goal: Treatment Goal: Demonstrate behavioral control of depressive symptoms, verbalize feelings of improved mood/affect, and adopt new coping skills prior to discharge  Outcome: Progressing  Goal: Verbalize thoughts and feelings  Description  Interventions:  - Assess and re-assess patient's level of risk   - Engage patient in 1:1 interactions, daily, for a minimum of 15 minutes   - Encourage patient to express feelings, fears, frustrations, hopes   Outcome: Progressing  Goal: Refrain from harming self  Description  Interventions:  - Monitor patient closely, per order   - Supervise medication ingestion, monitor effects and side effects   Outcome: Progressing  Goal: Refrain from isolation  Description  Interventions:  - Develop a trusting relationship   - Encourage socialization   Outcome: Progressing  Goal: Refrain from self-neglect  Outcome: Progressing  Goal: Complete daily ADLs, including personal hygiene independently, as able  Description  Interventions:  - Observe, teach, and assist patient with ADLS  -  Monitor and promote a balance of rest/activity, with adequate nutrition and elimination   Outcome: Progressing     Problem: Anxiety  Goal: Anxiety is at manageable level  Description  Interventions:  - Assess and monitor patient's anxiety level  - Monitor for signs and symptoms of anxiety both physical and emotional (heart palpitations, chest pain, shortness of breath, headaches, nausea, feeling jumpy, restlessness, irritable, apprehensive)  - Collaborate with interdisciplinary team and initiate plan and interventions as ordered    - [place] patient to unit/surroundings  - Explain treatment plan  - Encourage participation in care  - Encourage verbalization of concerns/fears  - Identify coping mechanisms  - Assist in developing anxiety-reducing skills  - Administer/offer alternative therapies  - Limit or eliminate stimulants  Outcome: Progressing     Problem: Prexisting or High Potential for Compromised Skin Integrity  Goal: Skin integrity is maintained or improved  Description  INTERVENTIONS:  - Identify patients at risk for skin breakdown  - Assess and monitor skin integrity  - Assess and monitor nutrition and hydration status  - Monitor labs (i e  albumin)  - Assess for incontinence   - Turn and reposition patient  - Assist with mobility/ambulation  - Relieve pressure over bony prominences  - Avoid friction and shearing  - Provide appropriate hygiene as needed including keeping skin clean and dry  - Evaluate need for skin moisturizer/barrier cream  - Collaborate with interdisciplinary team (i e  Nutrition, Rehabilitation, etc )   - Patient/family teaching  Outcome: Progressing

## 2019-06-05 NOTE — PROGRESS NOTES
Progress Note - Behavioral Health   Yassine Patrick 66 y o  female MRN: [id number]  Unit/Bed#: [facility] 243-96 Encounter: [id number]    The patient was seen for continuing care and reviewed with treatment team  Staff reports that the patient continues to remain irritable, intrusive, entitled, and demanding at times  Remains medication compliant and is attending all groups  Visible and social in the milieu  During assessment the patient is cooperative, but is specific in requests for additional services  Continues to appear paranoid  Denies any adverse side effects to medications, but "feels it is too early to tell " Denies any thoughts to hurt herself  Denies any auditory or visual hallucinations  Able to answer questions appropriately but rambles and remains illogical and disorganized       Mental Status Evaluation:  Appearance:  Marginal/poor hygiene   Behavior:  cooperative and Intrusive   Fund of knowledge  aware of current events and Aware of past history   Speech:   Language: Pressured; rambling  No overt abnormality   Mood:  irritable and anxious   Affect:   Associations: blunted  Intact   Thought Process:  Circumstantial and disorganized   Thought Content:  Paranoid and mistrustful and Obsessive ruminations   Perceptual Disturbances: Denies hallucinations and does not appear to be responding to internal stimuli   Risk Potential: No suicidal or homicidal ideation   Orientation  Oriented x 3   Memory No deficits   Attention/Concentration attention span appeared shorter than expected for age   Insight:  limited   Judgment: Limited   Gait/Station: normal gait/station and normal balance   Motor Activity: No abnormal movement noted     Progress Toward Goals: improving    Assessment/Plan    Principal Problem:    Schizoaffective disorder, bipolar type ([facility] )  Active Problems:    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[facility] )    Acquired hypothyroidism    Gastroesophageal reflux disease without esophagitis      Recommended Treatment: Continue with pharmacotherapy, group therapy, milieu therapy and occupational therapy  The patient will be maintained on the following medications:    Current Facility-Administered Medications:  acetaminophen 650 mg Oral Q6H PRN Ansley Baker MD   albuterol 2 puff Inhalation Q4H PRN Isidor Marrow, CRNP   aluminum-magnesium hydroxide-simethicone 15 mL Oral Q4H PRN Efren Montana, MD   ammonium lactate  Topical BID PRN Isidor Marrow, CRNP   benzonatate 100 mg Oral TID PRN Ansley Baker MD   benztropine 1 mg Intramuscular Q8H PRN Efren Montana, MD   benztropine 1 mg Oral Q8H PRN Efren Montana, MD   enoxaparin 40 mg Subcutaneous Daily Ansley Baker MD   fluticasone-vilanterol 1 puff Inhalation Daily Benny Spatz, MD   haloperidol 1 mg Oral Q6H PRN Efren Montana, MD   haloperidol lactate 2 mg Intramuscular Q6H PRN Efren Montana, MD   hydrOXYzine HCL 25 mg Oral Q6H PRN Efren Montana, MD   levothyroxine 125 mcg Oral Early Morning Ansley Baker MD   magnesium hydroxide 30 mL Oral Daily PRN Jesse Camilo MD   paliperidone 9 mg Oral Daily Christin Toney MD   pantoprazole 40 mg Oral Early Morning Ansley Baker MD   polyethylene glycol 17 g Oral Daily PRN Enedinadocoleen Marrow, CRNP   risperiDONE 1 mg Oral Q8H PRN Efren Montana MD   theophylline 200 mg Oral Daily Ansley Baker MD   traZODone 25 mg Oral HS PRN Ansley Baker MD   tuberculin 5 Units Intradermal Once Christin Toney MD       Risks, benefits and possible side effects of Medications:   Risks, benefits, and possible side effects of medications explained to patient and patient verbalizes understanding

## 2019-06-05 NOTE — PROGRESS NOTES
Pt pleasant with care  No irritability noted this evening  Continues with paranoia but denies SI/HI  Continues to refuse Incentive Spirometry  Pt ate 100% of dinner  Continues on 2L O2 via nasal cannula  VSS

## 2019-06-05 NOTE — PROGRESS NOTES
Pt less anxious and guarded  Med-compliant  VSS  Good appetite at breakfast, poor at lunch but accepted fluids  Pox 98% with O2 at 2 liters/min via nc  Monitored for safety and support

## 2019-06-05 NOTE — PROGRESS NOTES
Resting in bed with eyes closed and respirations noted  On 02 ay 2 L via nasal, cannula, PO 97 %   Monitored on safety checks  In no distress  Appears to be resting calmly

## 2019-06-05 NOTE — PROGRESS NOTES
Patient was visible  She is "socially isolative" though and only interacts with SELECT few peers, is judgemental when doing so  O2 at 2L/minute  Monitored for safety   Ambulation: steady gait

## 2019-06-05 NOTE — CASE MANAGEMENT
Writer spoke with Colonel Flores from [facility] regarding possible limitations of patient going to [facility]  It was agreed upon that writer will reach out to [employer] from [facility] to discuss this option and in hopes to set up phone conference with treatment team and [place] to discuss possible admission and the appropriateness of such  Writer called and left a voicemail for [place], awaiting call back  CM will continue to follow and provide service as needed

## 2019-06-06 PROCEDURE — 99232 SBSQ HOSP IP/OBS MODERATE 35: CPT | Performed by: NURSE PRACTITIONER

## 2019-06-06 RX ORDER — PALIPERIDONE 6 MG/1
12 TABLET, EXTENDED RELEASE ORAL DAILY
Status: DISCONTINUED | OUTPATIENT
Start: 2019-06-07 | End: 2019-06-07

## 2019-06-06 RX ADMIN — PALIPERIDONE 9 MG: 3 TABLET, EXTENDED RELEASE ORAL at 08:55

## 2019-06-06 RX ADMIN — FLUTICASONE FUROATE AND VILANTEROL TRIFENATATE 1 PUFF: 200; 25 POWDER RESPIRATORY (INHALATION) at 08:55

## 2019-06-06 RX ADMIN — PANTOPRAZOLE SODIUM 40 MG: 40 TABLET, DELAYED RELEASE ORAL at 05:17

## 2019-06-06 RX ADMIN — THEOPHYLLINE ANHYDROUS 200 MG: 200 CAPSULE, EXTENDED RELEASE ORAL at 08:55

## 2019-06-06 RX ADMIN — LEVOTHYROXINE SODIUM 125 MCG: 125 TABLET ORAL at 05:17

## 2019-06-06 NOTE — PLAN OF CARE
Problem: Alteration in Thoughts and Perception  Goal: Treatment Goal: Gain control of psychotic behaviors/thinking, reduce/eliminate presenting symptoms and demonstrate improved reality functioning upon discharge  Outcome: Progressing  Goal: Verbalize thoughts and feelings  Description  Interventions:  - Promote a nonjudgmental and trusting relationship with the patient through active listening and therapeutic communication  - Assess patient's level of functioning, behavior and potential for risk  - Engage patient in 1 on 1 interactions for a minimum of 15 minutes each session  - Encourage patient to express fears, feelings, frustrations, and discuss symptoms    - [place] patient to reality, help patient recognize reality-based thinking   - Administer medications as ordered and assess for potential side effects  - Provide the patient education related to the signs and symptoms of the illness and desired effects of prescribed medications  Outcome: Progressing  Goal: Refrain from acting on delusional thinking/internal stimuli  Description  Interventions:  - Monitor patient closely, per order   - Utilize least restrictive measures   - Set reasonable limits, give positive feedback for acceptable   - Administer medications as ordered and monitor of potential side effects  Outcome: Progressing  Goal: Agree to be compliant with medication regime, as prescribed and report medication side effects  Description  Interventions:  - Offer appropriate PRN medication and supervise ingestion; conduct aims, as needed   Outcome: Progressing  Goal: Recognize dysfunctional thoughts, communicate reality-based thoughts at the time of discharge  Description  Interventions:  - Provide medication and psycho-education to assist patient in compliance and developing insight into his/her illness   Outcome: Progressing  Goal: Complete daily ADLs, including personal hygiene independently, as able  Description  Interventions:  - Observe, teach, and assist patient with ADLS  - Monitor and promote a balance of rest/activity, with adequate nutrition and elimination   Outcome: Progressing     Problem: Risk for Self Injury/Neglect  Goal: Treatment Goal: Remain safe during length of stay, learn and adopt new coping skills, and be free of self-injurious ideation, impulses and acts at the time of discharge  Outcome: Progressing  Goal: Verbalize thoughts and feelings  Description  Interventions:  - Assess and re-assess patient's lethality and potential for self-injury  - Engage patient in 1:1 interactions, daily, for a minimum of 15 minutes  - Encourage patient to express feelings, fears, frustrations, hopes  - Establish rapport/trust with patient   Outcome: Progressing  Goal: Refrain from harming self  Description  Interventions:  - Monitor patient closely, per order  - Develop a trusting relationship  - Supervise medication ingestion, monitor effects and side effects   Outcome: Progressing  Goal: Recognize maladaptive responses and adopt new coping mechanisms  Outcome: Progressing  Goal: Complete daily ADLs, including personal hygiene independently, as able  Description  Interventions:  - Observe, teach, and assist patient with ADLS  - Monitor and promote a balance of rest/activity, with adequate nutrition and elimination  Outcome: Progressing     Problem: Depression  Goal: Treatment Goal: Demonstrate behavioral control of depressive symptoms, verbalize feelings of improved mood/affect, and adopt new coping skills prior to discharge  Outcome: Progressing  Goal: Verbalize thoughts and feelings  Description  Interventions:  - Assess and re-assess patient's level of risk   - Engage patient in 1:1 interactions, daily, for a minimum of 15 minutes   - Encourage patient to express feelings, fears, frustrations, hopes   Outcome: Progressing  Goal: Refrain from harming self  Description  Interventions:  - Monitor patient closely, per order   - Supervise medication ingestion, monitor effects and side effects   Outcome: Progressing  Goal: Refrain from isolation  Description  Interventions:  - Develop a trusting relationship   - Encourage socialization   Outcome: Progressing  Goal: Refrain from self-neglect  Outcome: Progressing  Goal: Complete daily ADLs, including personal hygiene independently, as able  Description  Interventions:  - Observe, teach, and assist patient with ADLS  -  Monitor and promote a balance of rest/activity, with adequate nutrition and elimination   Outcome: Progressing     Problem: Anxiety  Goal: Anxiety is at manageable level  Description  Interventions:  - Assess and monitor patient's anxiety level  - Monitor for signs and symptoms of anxiety both physical and emotional (heart palpitations, chest pain, shortness of breath, headaches, nausea, feeling jumpy, restlessness, irritable, apprehensive)  - Collaborate with interdisciplinary team and initiate plan and interventions as ordered    - [place] patient to unit/surroundings  - Explain treatment plan  - Encourage participation in care  - Encourage verbalization of concerns/fears  - Identify coping mechanisms  - Assist in developing anxiety-reducing skills  - Administer/offer alternative therapies  - Limit or eliminate stimulants  Outcome: Progressing     Problem: Prexisting or High Potential for Compromised Skin Integrity  Goal: Skin integrity is maintained or improved  Description  INTERVENTIONS:  - Identify patients at risk for skin breakdown  - Assess and monitor skin integrity  - Assess and monitor nutrition and hydration status  - Monitor labs (i e  albumin)  - Assess for incontinence   - Turn and reposition patient  - Assist with mobility/ambulation  - Relieve pressure over bony prominences  - Avoid friction and shearing  - Provide appropriate hygiene as needed including keeping skin clean and dry  - Evaluate need for skin moisturizer/barrier cream  - Collaborate with interdisciplinary team (i e  Nutrition, Rehabilitation, etc )   - Patient/family teaching  Outcome: Progressing

## 2019-06-06 NOTE — PROGRESS NOTES
Patient was in bed sleeping when the shift started  Breathing pattern even and unlabored  Q 7 minutes safety checks ongoing   Will continue to monitor
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
## 2019-06-06 NOTE — PROGRESS NOTES
Pt with good appetite and steady gait  VSS, Pox 99% with O2 @ 2 liters/min via nc  Refused lovenox  Pleasant with brighter affect  Did not attend group  Monitored for safety and support

## 2019-06-07 PROCEDURE — 99231 SBSQ HOSP IP/OBS SF/LOW 25: CPT | Performed by: NURSE PRACTITIONER

## 2019-06-07 RX ORDER — QUETIAPINE FUMARATE 100 MG/1
100 TABLET, FILM COATED ORAL
Status: DISCONTINUED | OUTPATIENT
Start: 2019-06-07 | End: 2019-06-09

## 2019-06-07 RX ADMIN — FLUTICASONE FUROATE AND VILANTEROL TRIFENATATE 1 PUFF: 200; 25 POWDER RESPIRATORY (INHALATION) at 08:42

## 2019-06-07 RX ADMIN — PANTOPRAZOLE SODIUM 40 MG: 40 TABLET, DELAYED RELEASE ORAL at 05:38

## 2019-06-07 RX ADMIN — LEVOTHYROXINE SODIUM 125 MCG: 125 TABLET ORAL at 05:38

## 2019-06-07 RX ADMIN — QUETIAPINE FUMARATE 100 MG: 100 TABLET ORAL at 22:26

## 2019-06-07 RX ADMIN — PALIPERIDONE 12 MG: 6 TABLET, FILM COATED, EXTENDED RELEASE ORAL at 08:41

## 2019-06-07 RX ADMIN — THEOPHYLLINE ANHYDROUS 200 MG: 200 CAPSULE, EXTENDED RELEASE ORAL at 08:41

## 2019-06-07 NOTE — PROGRESS NOTES
Awake at start of shift conversing with roommate  Currently appears to be resting , eyes closed and respirations even , O2 at 2 l via nasal cannula, PO 97 %   Monitored on Q 7 minute safety checks  Declines to use spirometer when reminded

## 2019-06-07 NOTE — CASE MANAGEMENT
Writer spoke with Kiya Gaona, who supports patient going to [facility]  Writer to set up phone conference for Monday/Tuesday of this coming week  CM will continue to follow and provide services as needed

## 2019-06-07 NOTE — PROGRESS NOTES
Pt upset re  Increased invega but compliant  Pt concerned with change in BP r/t increased invega, 94/55 lying at 1335  Appetite fair  Continues to refuse lovenox, Dr Yu Bergeron notified  Denied SI, no agitation noted  Pox 97 with O2 at 2 liters/min via nc  Monitored for safety and support

## 2019-06-07 NOTE — PROGRESS NOTES
Progress Note - Behavioral Health   Bailee Stage 66 y o  female MRN: [id number]  Unit/Bed#: Staci Holly 319-89 Encounter: [id number]    The patient was seen for continuing care and reviewed with treatment team  Staff reports that the patient remains visible on the unit, cooperative with care, and medication compliant  Attending select groups  During assessment the patient remains paranoid and suspicious about staff  Patient reports, "I don't accept food from people that I don't know  My grandmother taught me that " Patient reports increased side effects from Invega increase  Will taper to 9 mg and add Seroquel 100mg at hs for paranoia, suspicious behavior, and sleep  Continues to discuss the chip in her arm that tells her how to behave for her "wifey-poo " Denies any thoughts to hurt herself or others  Denies any depression, anxiety, or auditory or visual hallucinations       Mental Status Evaluation:  Appearance:  Adequate hygiene and grooming   Behavior:  cooperative   Fund of knowledge  aware of current events and Aware of past history   Speech:   Language: Normal rate and Normal volume  No overt abnormality   Mood:  improving   Affect:   Associations: blunted  Intact   Thought Process:  Circumstantial   Thought Content:  Paranoid and mistrustful, Delusions of persecution, Grandiose delusions and Obsessive ruminations   Perceptual Disturbances: Denies hallucinations and does not appear to be responding to internal stimuli   Risk Potential: No suicidal or homicidal ideation   Orientation  Oriented x 3   Memory No deficits   Attention/Concentration attention span appeared shorter than expected for age   Insight:  No insight   Judgment: Poor judgment   Gait/Station: normal gait/station and normal balance   Motor Activity: No abnormal movement noted     Progress Toward Goals: improving    Assessment/Plan    Principal Problem:    Schizoaffective disorder, bipolar type ([facility] )  Active Problems:    COPD with asthma [facility])    Acquired hypothyroidism    Gastroesophageal reflux disease without esophagitis      Recommended Treatment: Continue with pharmacotherapy, group therapy, milieu therapy and occupational therapy  The patient will be maintained on the following medications:    Current Facility-Administered Medications:  acetaminophen 650 mg Oral Q6H PRN Denisha Norris MD   albuterol 2 puff Inhalation Q4H PRN ABILIO Stringer   aluminum-magnesium hydroxide-simethicone 15 mL Oral Q4H PRN Dick Thayer MD   ammonium lactate  Topical BID PRN ABILIO Stringer   benzonatate 100 mg Oral TID PRN Denisha Norris MD   benztropine 1 mg Intramuscular Q8H PRN Dick Thayer MD   benztropine 1 mg Oral Q8H PRN Dick Thayer MD   enoxaparin 40 mg Subcutaneous Daily Denisha Norris MD   fluticasone-vilanterol 1 puff Inhalation Daily Barb Downey MD   haloperidol 1 mg Oral Q6H PRN Pita Caldwell MD   haloperidol lactate 2 mg Intramuscular Q6H PRN Pita Caldwell MD   hydrOXYzine HCL 25 mg Oral Q6H PRN Dick Thayer MD   levothyroxine 125 mcg Oral Early Morning Denisha Norris MD   magnesium hydroxide 30 mL Oral Daily PRN Nilsa Guajardo MD   [START ON 6/8/2019] paliperidone 9 mg Oral Daily ABILIO Stringer   pantoprazole 40 mg Oral Early Morning Denisha Norris MD   polyethylene glycol 17 g Oral Daily PRN ABILIO Stringer   QUEtiapine 100 mg Oral HS ABILIO Stringer   risperiDONE 1 mg Oral Q8H PRN Dick Thayer MD   theophylline 200 mg Oral Daily Denisha Norris MD   traZODone 25 mg Oral HS PRN Denisha Norris MD   tuberculin 5 Units Intradermal Once Pita Caldwell MD       Risks, benefits and possible side effects of Medications:   Risks, benefits, and possible side effects of medications explained to patient and patient verbalizes understanding

## 2019-06-07 NOTE — PROGRESS NOTES
Pt visible in milieu  Pt social among staff and peers  Continues with O2 2L via NC, SpO2 96%  No new concerns  Pt ambulates with steady gait  Pt compliant with meal regime  Continues to refuse Incentive Spirometer Q1h   VSS

## 2019-06-08 RX ADMIN — PALIPERIDONE 9 MG: 3 TABLET, EXTENDED RELEASE ORAL at 08:13

## 2019-06-08 RX ADMIN — PANTOPRAZOLE SODIUM 40 MG: 40 TABLET, DELAYED RELEASE ORAL at 05:54

## 2019-06-08 RX ADMIN — FLUTICASONE FUROATE AND VILANTEROL TRIFENATATE 1 PUFF: 200; 25 POWDER RESPIRATORY (INHALATION) at 08:12

## 2019-06-08 RX ADMIN — LEVOTHYROXINE SODIUM 125 MCG: 125 TABLET ORAL at 05:54

## 2019-06-08 RX ADMIN — THEOPHYLLINE ANHYDROUS 200 MG: 200 CAPSULE, EXTENDED RELEASE ORAL at 08:13

## 2019-06-08 NOTE — PROGRESS NOTES
Psychiatry Progress Note    Subjective: Interval History     Patient isolative to her room this morning  Patient reporting that she is feeling too tired this morning after receiving Seroquel 100 mg last evening  Patient reports that she did sleep well on the dose however that she was incontinent and had urinated on herself last evening  Patient also reporting that she felt that she was slurring her words when she woke up  Patient reporting that she is willing to continue the medication however requesting to trial a decreased dosing  Patient minimizing all psychiatric symptoms  Denying any suicidal ideations or homicidal ideations  Has continued to make delusional statements of persecutory content  Also still with somatic delusion of having a chip located on her arm telling her how to behave  Medication compliant    Ate 100% of breakfast yesterday 25% of lunch and 50% of dinner    Behavior over the last 24 hours:  unchanged  Sleep: normal  Appetite: normal  Medication side effects: No  ROS: potential incontinence secondary to Seroquel    Current medications:    Current Facility-Administered Medications:     acetaminophen (TYLENOL) tablet 650 mg, 650 mg, Oral, Q6H PRN, Codi Ortiz MD    albuterol (PROVENTIL HFA,VENTOLIN HFA) inhaler 2 puff, 2 puff, Inhalation, Q4H PRN, ABILIO Garcia    aluminum-magnesium hydroxide-simethicone (MYLANTA) 200-200-20 mg/5 mL oral suspension 15 mL, 15 mL, Oral, Q4H PRN, Merissa Hamlin MD, 15 mL at 05/12/19 2221    ammonium lactate (LAC-HYDRIN) 12 % lotion, , Topical, BID PRN, ABILIO Garcia, 1 application at 48/67/36 1114    benzonatate (TESSALON PERLES) capsule 100 mg, 100 mg, Oral, TID PRN, Codi Ortiz MD    benztropine (COGENTIN) injection 1 mg, 1 mg, Intramuscular, Q8H PRN, Merissa Hamlin MD    benztropine (COGENTIN) tablet 1 mg, 1 mg, Oral, Q8H PRN, Merissa Hamlin MD    enoxaparin (LOVENOX) subcutaneous injection 40 mg, 40 mg, Subcutaneous, Daily, Eze Trivedi MD    fluticasone-vilanterol (BREO ELLIPTA) 200-25 MCG/INH inhaler 1 puff, 1 puff, Inhalation, Daily, Kendrick Ramirez MD, 1 puff at 06/08/19 7028    haloperidol (HALDOL) oral concentrated solution 1 mg, 1 mg, Oral, Q6H PRN, Conway Bamberger, MD    haloperidol lactate (HALDOL) injection 2 mg, 2 mg, Intramuscular, Q6H PRN, Conway Bamberger, MD    hydrOXYzine HCL (ATARAX) tablet 25 mg, 25 mg, Oral, Q6H PRN, Lucie Aviles MD, 25 mg at 05/29/19 1326    levothyroxine tablet 125 mcg, 125 mcg, Oral, Early Morning, Eze Trivedi MD, 125 mcg at 06/08/19 0554    magnesium hydroxide (MILK OF MAGNESIA) 400 mg/5 mL oral suspension 30 mL, 30 mL, Oral, Daily PRN, Hong Erickson MD    paliperidone (INVEGA) 24 hr tablet 9 mg, 9 mg, Oral, Daily, ABILIO Tam, 9 mg at 06/08/19 0813    pantoprazole (PROTONIX) EC tablet 40 mg, 40 mg, Oral, Early Morning, Eze Trivedi MD, 40 mg at 06/08/19 0554    polyethylene glycol (MIRALAX) packet 17 g, 17 g, Oral, Daily PRN, ABILIO Tam    QUEtiapine (SEROquel) tablet 100 mg, 100 mg, Oral, HS, ABILIO Tam, 100 mg at 06/07/19 2226    risperiDONE (RisperDAL M-TABS) dispersible tablet 1 mg, 1 mg, Oral, Q8H PRN, Lucie Aviles MD    theophylline (JEF-24) 24 hr capsule 200 mg, 200 mg, Oral, Daily, Eze Trivedi MD, 200 mg at 06/08/19 0813    traZODone (DESYREL) tablet 25 mg, 25 mg, Oral, HS PRN, Eze Trivedi MD    tuberculin injection 5 Units, 5 Units, Intradermal, Once, Conway Bamberger, MD, Stopped at 05/22/19 1425    Current Problem List:    Patient Active Problem List   Diagnosis    Sepsis ([address] )    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[facility]    Tobacco use disorder, continuous    Bipolar disorder ([facility]    Left hip pain    Lactic acidosis    Hypokalemia    Hypomagnesemia    Compression fracture of L4 lumbar vertebra    Thoracic compression fracture ([facility]    Ventral hernia    Parapneumonic effusion    Acute on chronic respiratory failure with hypoxia ([facility] )  Chronic respiratory failure (HCC)    Hypophosphatemia    Elevated MCV    Compression deformity of vertebra    Schizoaffective disorder, bipolar type (HCC)    Acquired hypothyroidism    Gastroesophageal reflux disease without esophagitis    Abnormal CT of the chest    Excessive cerumen in left ear canal    Lipoma of right upper extremity    Polydipsia    Localized swelling of both lower legs       Problem list reviewed 06/08/19     Objective:     Vital Signs:  Vitals:    06/07/19 0701 06/07/19 1343 06/07/19 1605 06/07/19 2030   BP: 108/58 94/55 111/56 111/62   BP Location: Left arm Left arm Right arm Left arm   Pulse: 77 91 78 80   Resp: 16  16 18   Temp: (!) 97 4 °F (36 3 °C)  97 6 °F (36 4 °C) 98 4 °F (36 9 °C)   TempSrc: Tympanic  Tympanic Temporal   SpO2: 97%  97% 98%   Weight:       Height:             Appearance:  age appropriate and casually dressed   Behavior:  normal   Speech:  normal volume   Mood:  anxious   Affect:  constricted   Thought Process:  normal   Thought Content:  delusions  somatic   Perceptual Disturbances: None   Risk Potential: none   Sensorium:  person, place and time   Cognition:  intact   Consciousness:  alert and awake    Attention: attention span and concentration were age appropriate   Intellect: average   Insight:  limited   Judgment: limited      Motor Activity: no abnormal movements       I/O Past 24 hours:  I/O last 3 completed shifts: In: 2200 [P O :2200]  Out: -   No intake/output data recorded  Labs:  Reviewed 06/08/19    Progress Toward Goals:  Unchanged, ongoing delusional thought content    Assessment / Plan:     Schizoaffective disorder, bipolar type ([address] )    Recommended Treatment:      Medication changes:  1) trial decrease of Seroquel to 75 mg at HS    Non-pharmacological treatments  1) Continue with group therapy, milieu therapy and occupational therapy  Safety  1) Safety/communication plan established targeting dynamic risk factors above      2) Risks, benefits, and possible side effects of medications explained to patient and patient verbalizes understanding  Counseling / Coordination of Care    Total floor / unit time spent today 20 minutes  Greater than 50% of total time was spent with the patient and / or family counseling and / or coordination of care  A description of the counseling / coordination of care  Patient's Rights, confidentiality and exceptions to confidentiality, use of automated medical record, [facility] staff access to medical record, and consent to treatment reviewed      Neysa Apley, PA-C

## 2019-06-08 NOTE — PROGRESS NOTES
Patient mosrtly calm and cooperative  She does talk about getting "pushed ahead" for housing; "getting my own apartment and living alone" yet she does nothing for herself  O2 via NC at 2L/minute   Took HS  Seroquel as ordered

## 2019-06-08 NOTE — PROGRESS NOTES
Currently observed in bed with O2 at 2 l via nasal cannula , PO 98%   Appears to be resting calmly with eyes closed and respirations even  Monitored on Q 7 minute safety checks

## 2019-06-08 NOTE — PROGRESS NOTES
Patient is alert and oriented x4  Is visual during meals  Pleasant and cooperative with approach  Denies SI/HI or hallucinations  Presented low BP this morning 83/51 retake by RN 0730 improved to 110/68  Patient denied symptoms of hypotension  Patient remains attention seeking   Patient denies anxiety or depression   Medication and meals compliant  Will continue to monitor for safety and support

## 2019-06-08 NOTE — PLAN OF CARE
Problem: Alteration in Thoughts and Perception  Goal: Treatment Goal: Gain control of psychotic behaviors/thinking, reduce/eliminate presenting symptoms and demonstrate improved reality functioning upon discharge  Outcome: Progressing  Goal: Verbalize thoughts and feelings  Description  Interventions:  - Promote a nonjudgmental and trusting relationship with the patient through active listening and therapeutic communication  - Assess patient's level of functioning, behavior and potential for risk  - Engage patient in 1 on 1 interactions for a minimum of 15 minutes each session  - Encourage patient to express fears, feelings, frustrations, and discuss symptoms    - [place] patient to reality, help patient recognize reality-based thinking   - Administer medications as ordered and assess for potential side effects  - Provide the patient education related to the signs and symptoms of the illness and desired effects of prescribed medications  Outcome: Progressing  Goal: Refrain from acting on delusional thinking/internal stimuli  Description  Interventions:  - Monitor patient closely, per order   - Utilize least restrictive measures   - Set reasonable limits, give positive feedback for acceptable   - Administer medications as ordered and monitor of potential side effects  Outcome: Progressing  Goal: Agree to be compliant with medication regime, as prescribed and report medication side effects  Description  Interventions:  - Offer appropriate PRN medication and supervise ingestion; conduct aims, as needed   Outcome: Progressing  Goal: Attend and participate in unit activities, including therapeutic, recreational, and educational groups  Description  Interventions:  - Provide therapeutic and educational activities daily, encourage attendance and participation, and document same in the medical record   Outcome: Progressing  Goal: Recognize dysfunctional thoughts, communicate reality-based thoughts at the time of discharge  Description  Interventions:  - Provide medication and psycho-education to assist patient in compliance and developing insight into his/her illness   Outcome: Progressing  Goal: Complete daily ADLs, including personal hygiene independently, as able  Description  Interventions:  - Observe, teach, and assist patient with ADLS  - Monitor and promote a balance of rest/activity, with adequate nutrition and elimination   Outcome: Progressing     Problem: Ineffective Coping  Goal: Identifies ineffective coping skills  Outcome: Progressing  Goal: Identifies healthy coping skills  Outcome: Progressing  Goal: Demonstrates healthy coping skills  Outcome: Progressing  Goal: Participates in unit activities  Description  Interventions:  - Provide therapeutic environment   - Provide required programming   - Redirect inappropriate behaviors   Outcome: Progressing  Goal: Patient/Family participate in treatment and DC plans  Description  Interventions:  - Provide therapeutic environment  Outcome: Progressing  Goal: Patient/Family verbalizes awareness of resources  Outcome: Progressing  Goal: Understands least restrictive measures  Description  Interventions:  - Utilize least restrictive behavior  Outcome: Progressing  Goal: Free from restraint events  Description  - Utilize least restrictive measures   - Provide behavioral interventions   - Redirect inappropriate behaviors   Outcome: Progressing     Problem: Risk for Self Injury/Neglect  Goal: Treatment Goal: Remain safe during length of stay, learn and adopt new coping skills, and be free of self-injurious ideation, impulses and acts at the time of discharge  Outcome: Progressing  Goal: Verbalize thoughts and feelings  Description  Interventions:  - Assess and re-assess patient's lethality and potential for self-injury  - Engage patient in 1:1 interactions, daily, for a minimum of 15 minutes  - Encourage patient to express feelings, fears, frustrations, hopes  - Establish rapport/trust with patient   Outcome: Progressing  Goal: Refrain from harming self  Description  Interventions:  - Monitor patient closely, per order  - Develop a trusting relationship  - Supervise medication ingestion, monitor effects and side effects   Outcome: Progressing  Goal: Attend and participate in unit activities, including therapeutic, recreational, and educational groups  Description  Interventions:  - Provide therapeutic and educational activities daily, encourage attendance and participation, and document same in the medical record  - Obtain collateral information, encourage visitation and family involvement in care   Outcome: Progressing  Goal: Recognize maladaptive responses and adopt new coping mechanisms  Outcome: Progressing  Goal: Complete daily ADLs, including personal hygiene independently, as able  Description  Interventions:  - Observe, teach, and assist patient with ADLS  - Monitor and promote a balance of rest/activity, with adequate nutrition and elimination  Outcome: Progressing     Problem: Depression  Goal: Treatment Goal: Demonstrate behavioral control of depressive symptoms, verbalize feelings of improved mood/affect, and adopt new coping skills prior to discharge  Outcome: Progressing  Goal: Verbalize thoughts and feelings  Description  Interventions:  - Assess and re-assess patient's level of risk   - Engage patient in 1:1 interactions, daily, for a minimum of 15 minutes   - Encourage patient to express feelings, fears, frustrations, hopes   Outcome: Progressing  Goal: Refrain from harming self  Description  Interventions:  - Monitor patient closely, per order   - Supervise medication ingestion, monitor effects and side effects   Outcome: Progressing  Goal: Refrain from isolation  Description  Interventions:  - Develop a trusting relationship   - Encourage socialization   Outcome: Progressing  Goal: Refrain from self-neglect  Outcome: Progressing  Goal: Attend and participate in unit activities, including therapeutic, recreational, and educational groups  Description  Interventions:  - Provide therapeutic and educational activities daily, encourage attendance and participation, and document same in the medical record   Outcome: Progressing  Goal: Complete daily ADLs, including personal hygiene independently, as able  Description  Interventions:  - Observe, teach, and assist patient with ADLS  -  Monitor and promote a balance of rest/activity, with adequate nutrition and elimination   Outcome: Progressing     Problem: Anxiety  Goal: Anxiety is at manageable level  Description  Interventions:  - Assess and monitor patient's anxiety level  - Monitor for signs and symptoms of anxiety both physical and emotional (heart palpitations, chest pain, shortness of breath, headaches, nausea, feeling jumpy, restlessness, irritable, apprehensive)  - Collaborate with interdisciplinary team and initiate plan and interventions as ordered    - [place] patient to unit/surroundings  - Explain treatment plan  - Encourage participation in care  - Encourage verbalization of concerns/fears  - Identify coping mechanisms  - Assist in developing anxiety-reducing skills  - Administer/offer alternative therapies  - Limit or eliminate stimulants  Outcome: Progressing     Problem: DISCHARGE PLANNING - CARE MANAGEMENT  Goal: Discharge to post-acute care or home with appropriate resources  Description  INTERVENTIONS:  - Conduct assessment to determine patient/family and health care team treatment goals, and need for post-acute services based on payer coverage, community resources, and patient preferences, and barriers to discharge  - Address psychosocial, clinical, and financial barriers to discharge as identified in assessment in conjunction with the patient/family and health care team  - Arrange appropriate level of post-acute services according to patients   needs and preference and payer coverage in collaboration with the physician and health care team  - Communicate with and update the patient/family, physician, and health care team regarding progress on the discharge plan  - Arrange appropriate transportation to post-acute venues  Outcome: Progressing     Problem: Prexisting or High Potential for Compromised Skin Integrity  Goal: Skin integrity is maintained or improved  Description  INTERVENTIONS:  - Identify patients at risk for skin breakdown  - Assess and monitor skin integrity  - Assess and monitor nutrition and hydration status  - Monitor labs (i e  albumin)  - Assess for incontinence   - Turn and reposition patient  - Assist with mobility/ambulation  - Relieve pressure over bony prominences  - Avoid friction and shearing  - Provide appropriate hygiene as needed including keeping skin clean and dry  - Evaluate need for skin moisturizer/barrier cream  - Collaborate with interdisciplinary team (i e  Nutrition, Rehabilitation, etc )   - Patient/family teaching  Outcome: Progressing

## 2019-06-09 RX ORDER — QUETIAPINE FUMARATE 50 MG/1
50 TABLET, FILM COATED ORAL
Status: DISCONTINUED | OUTPATIENT
Start: 2019-06-09 | End: 2019-06-19

## 2019-06-09 RX ADMIN — PANTOPRAZOLE SODIUM 40 MG: 40 TABLET, DELAYED RELEASE ORAL at 06:19

## 2019-06-09 RX ADMIN — THEOPHYLLINE ANHYDROUS 200 MG: 200 CAPSULE, EXTENDED RELEASE ORAL at 08:49

## 2019-06-09 RX ADMIN — QUETIAPINE FUMARATE 50 MG: 50 TABLET, FILM COATED ORAL at 21:47

## 2019-06-09 RX ADMIN — LEVOTHYROXINE SODIUM 125 MCG: 125 TABLET ORAL at 06:19

## 2019-06-09 RX ADMIN — FLUTICASONE FUROATE AND VILANTEROL TRIFENATATE 1 PUFF: 200; 25 POWDER RESPIRATORY (INHALATION) at 08:09

## 2019-06-09 RX ADMIN — PALIPERIDONE 9 MG: 3 TABLET, EXTENDED RELEASE ORAL at 08:49

## 2019-06-09 NOTE — PROGRESS NOTES
Patient is alert and oriented x4  Is Pleasant and cooperative with approach  Is upset about Seroquel dosage  Patient informed that the order was already decreased to 50 mg HS  Asked to speak with Dr Rito Gutierrez about her medication regimen  Dr Rito Gutierrez addressed patient this morning and she wanted just to talk  Patient is medication and meals compliant this morning  Will keep monitoring for safety and support

## 2019-06-09 NOTE — PLAN OF CARE
Problem: Alteration in Thoughts and Perception  Goal: Treatment Goal: Gain control of psychotic behaviors/thinking, reduce/eliminate presenting symptoms and demonstrate improved reality functioning upon discharge  Outcome: Progressing  Goal: Verbalize thoughts and feelings  Description  Interventions:  - Promote a nonjudgmental and trusting relationship with the patient through active listening and therapeutic communication  - Assess patient's level of functioning, behavior and potential for risk  - Engage patient in 1 on 1 interactions for a minimum of 15 minutes each session  - Encourage patient to express fears, feelings, frustrations, and discuss symptoms    - [place] patient to reality, help patient recognize reality-based thinking   - Administer medications as ordered and assess for potential side effects  - Provide the patient education related to the signs and symptoms of the illness and desired effects of prescribed medications  Outcome: Progressing  Goal: Refrain from acting on delusional thinking/internal stimuli  Description  Interventions:  - Monitor patient closely, per order   - Utilize least restrictive measures   - Set reasonable limits, give positive feedback for acceptable   - Administer medications as ordered and monitor of potential side effects  Outcome: Progressing  Goal: Agree to be compliant with medication regime, as prescribed and report medication side effects  Description  Interventions:  - Offer appropriate PRN medication and supervise ingestion; conduct aims, as needed   Outcome: Progressing  Goal: Attend and participate in unit activities, including therapeutic, recreational, and educational groups  Description  Interventions:  - Provide therapeutic and educational activities daily, encourage attendance and participation, and document same in the medical record   Outcome: Progressing  Goal: Recognize dysfunctional thoughts, communicate reality-based thoughts at the time of discharge  Description  Interventions:  - Provide medication and psycho-education to assist patient in compliance and developing insight into his/her illness   Outcome: Progressing  Goal: Complete daily ADLs, including personal hygiene independently, as able  Description  Interventions:  - Observe, teach, and assist patient with ADLS  - Monitor and promote a balance of rest/activity, with adequate nutrition and elimination   Outcome: Progressing     Problem: Ineffective Coping  Goal: Identifies ineffective coping skills  Outcome: Progressing  Goal: Identifies healthy coping skills  Outcome: Progressing  Goal: Demonstrates healthy coping skills  Outcome: Progressing  Goal: Participates in unit activities  Description  Interventions:  - Provide therapeutic environment   - Provide required programming   - Redirect inappropriate behaviors   Outcome: Progressing  Goal: Patient/Family participate in treatment and DC plans  Description  Interventions:  - Provide therapeutic environment  Outcome: Progressing  Goal: Patient/Family verbalizes awareness of resources  Outcome: Progressing  Goal: Understands least restrictive measures  Description  Interventions:  - Utilize least restrictive behavior  Outcome: Progressing  Goal: Free from restraint events  Description  - Utilize least restrictive measures   - Provide behavioral interventions   - Redirect inappropriate behaviors   Outcome: Progressing     Problem: Risk for Self Injury/Neglect  Goal: Treatment Goal: Remain safe during length of stay, learn and adopt new coping skills, and be free of self-injurious ideation, impulses and acts at the time of discharge  Outcome: Progressing  Goal: Verbalize thoughts and feelings  Description  Interventions:  - Assess and re-assess patient's lethality and potential for self-injury  - Engage patient in 1:1 interactions, daily, for a minimum of 15 minutes  - Encourage patient to express feelings, fears, frustrations, hopes  - Establish rapport/trust with patient   Outcome: Progressing  Goal: Refrain from harming self  Description  Interventions:  - Monitor patient closely, per order  - Develop a trusting relationship  - Supervise medication ingestion, monitor effects and side effects   Outcome: Progressing  Goal: Attend and participate in unit activities, including therapeutic, recreational, and educational groups  Description  Interventions:  - Provide therapeutic and educational activities daily, encourage attendance and participation, and document same in the medical record  - Obtain collateral information, encourage visitation and family involvement in care   Outcome: Progressing  Goal: Recognize maladaptive responses and adopt new coping mechanisms  Outcome: Progressing  Goal: Complete daily ADLs, including personal hygiene independently, as able  Description  Interventions:  - Observe, teach, and assist patient with ADLS  - Monitor and promote a balance of rest/activity, with adequate nutrition and elimination  Outcome: Progressing     Problem: Depression  Goal: Treatment Goal: Demonstrate behavioral control of depressive symptoms, verbalize feelings of improved mood/affect, and adopt new coping skills prior to discharge  Outcome: Progressing  Goal: Verbalize thoughts and feelings  Description  Interventions:  - Assess and re-assess patient's level of risk   - Engage patient in 1:1 interactions, daily, for a minimum of 15 minutes   - Encourage patient to express feelings, fears, frustrations, hopes   Outcome: Progressing  Goal: Refrain from harming self  Description  Interventions:  - Monitor patient closely, per order   - Supervise medication ingestion, monitor effects and side effects   Outcome: Progressing  Goal: Refrain from isolation  Description  Interventions:  - Develop a trusting relationship   - Encourage socialization   Outcome: Progressing  Goal: Refrain from self-neglect  Outcome: Progressing  Goal: Attend and participate in unit activities, including therapeutic, recreational, and educational groups  Description  Interventions:  - Provide therapeutic and educational activities daily, encourage attendance and participation, and document same in the medical record   Outcome: Progressing  Goal: Complete daily ADLs, including personal hygiene independently, as able  Description  Interventions:  - Observe, teach, and assist patient with ADLS  -  Monitor and promote a balance of rest/activity, with adequate nutrition and elimination   Outcome: Progressing     Problem: Anxiety  Goal: Anxiety is at manageable level  Description  Interventions:  - Assess and monitor patient's anxiety level  - Monitor for signs and symptoms of anxiety both physical and emotional (heart palpitations, chest pain, shortness of breath, headaches, nausea, feeling jumpy, restlessness, irritable, apprehensive)  - Collaborate with interdisciplinary team and initiate plan and interventions as ordered    - [place] patient to unit/surroundings  - Explain treatment plan  - Encourage participation in care  - Encourage verbalization of concerns/fears  - Identify coping mechanisms  - Assist in developing anxiety-reducing skills  - Administer/offer alternative therapies  - Limit or eliminate stimulants  Outcome: Progressing     Problem: DISCHARGE PLANNING - CARE MANAGEMENT  Goal: Discharge to post-acute care or home with appropriate resources  Description  INTERVENTIONS:  - Conduct assessment to determine patient/family and health care team treatment goals, and need for post-acute services based on payer coverage, community resources, and patient preferences, and barriers to discharge  - Address psychosocial, clinical, and financial barriers to discharge as identified in assessment in conjunction with the patient/family and health care team  - Arrange appropriate level of post-acute services according to patients   needs and preference and payer coverage in collaboration with the physician and health care team  - Communicate with and update the patient/family, physician, and health care team regarding progress on the discharge plan  - Arrange appropriate transportation to post-acute venues  Outcome: Progressing     Problem: Prexisting or High Potential for Compromised Skin Integrity  Goal: Skin integrity is maintained or improved  Description  INTERVENTIONS:  - Identify patients at risk for skin breakdown  - Assess and monitor skin integrity  - Assess and monitor nutrition and hydration status  - Monitor labs (i e  albumin)  - Assess for incontinence   - Turn and reposition patient  - Assist with mobility/ambulation  - Relieve pressure over bony prominences  - Avoid friction and shearing  - Provide appropriate hygiene as needed including keeping skin clean and dry  - Evaluate need for skin moisturizer/barrier cream  - Collaborate with interdisciplinary team (i e  Nutrition, Rehabilitation, etc )   - Patient/family teaching  Outcome: Progressing

## 2019-06-09 NOTE — NURSING NOTE
Patient resting quietly in bed this evening  She was OOB for snacks  She is refusing Seroquel 100 mg  Patient says " I feel tired and my blood pressure was low this morning because of the Seroquel" " Patient also said "I should take 50 mg not 100 mg  Patient denies depression, anxiety, AH, VH  Denies pain, no SOB  No further complaints   Will continue to monitor per protocol

## 2019-06-09 NOTE — PROGRESS NOTES
Patient remains calling out of her room for ADLs  Reasurred several times   Denies pain  Will keep monitoring

## 2019-06-09 NOTE — PROGRESS NOTES
Psychiatry Progress Note    Subjective: Interval History     Patient continues to be isolative to her room  Patient this morning reporting that she refused Seroquel dosing last evening  Continues to express that she is willing to trial the medication but will only trial it starting at 50 mg  Patient stating that is the reason that her blood pressure has been running low  Patient reports that she feels less sedated this morning not taking the dosing last evening  Superficially denying all psychiatric symptoms  No overt delusional thought content during assessment       Behavior over the last 24 hours:  unchanged  Sleep: normal  Appetite: normal  Medication side effects: No  ROS: potential incontinence secondary to Seroquel    Current medications:    Current Facility-Administered Medications:     acetaminophen (TYLENOL) tablet 650 mg, 650 mg, Oral, Q6H PRN, Sandy Acosta MD    albuterol (PROVENTIL HFA,VENTOLIN HFA) inhaler 2 puff, 2 puff, Inhalation, Q4H PRN, ABILIO Beltran    aluminum-magnesium hydroxide-simethicone (MYLANTA) 200-200-20 mg/5 mL oral suspension 15 mL, 15 mL, Oral, Q4H PRN, Kaela Mares MD, 15 mL at 05/12/19 2221    ammonium lactate (LAC-HYDRIN) 12 % lotion, , Topical, BID PRN, ABILIO Beltran, 1 application at 05/73/61 1114    benzonatate (TESSALON PERLES) capsule 100 mg, 100 mg, Oral, TID PRN, Sandy Acosta MD    benztropine (COGENTIN) injection 1 mg, 1 mg, Intramuscular, Q8H PRN, Kaela Mares MD    benztropine (COGENTIN) tablet 1 mg, 1 mg, Oral, Q8H PRN, Kaela Mares MD    enoxaparin (LOVENOX) subcutaneous injection 40 mg, 40 mg, Subcutaneous, Daily, Sandy Acosta MD    fluticasone-vilanterol (BREO ELLIPTA) 200-25 MCG/INH inhaler 1 puff, 1 puff, Inhalation, Daily, Devan Guy MD, 1 puff at 06/09/19 0809    haloperidol (HALDOL) oral concentrated solution 1 mg, 1 mg, Oral, Q6H PRN, Rashida Gerber MD    haloperidol lactate (HALDOL) injection 2 mg, 2 mg, Intramuscular, Q6H PRN, Tamy Luke MD    hydrOXYzine HCL (ATARAX) tablet 25 mg, 25 mg, Oral, Q6H PRN, Jurgen Guido MD, 25 mg at 05/29/19 1326    levothyroxine tablet 125 mcg, 125 mcg, Oral, Early Morning, Artem Davidson MD, 125 mcg at 06/09/19 7671    magnesium hydroxide (MILK OF MAGNESIA) 400 mg/5 mL oral suspension 30 mL, 30 mL, Oral, Daily PRN, Ana Hinojosa MD    paliperidone (INVEGA) 24 hr tablet 9 mg, 9 mg, Oral, Daily, Sunshine Level, CRNP, 9 mg at 06/08/19 0813    pantoprazole (PROTONIX) EC tablet 40 mg, 40 mg, Oral, Early Morning, Artem Davidson MD, 40 mg at 06/09/19 7209    polyethylene glycol (MIRALAX) packet 17 g, 17 g, Oral, Daily PRN, Sunshine Level, CRNP    QUEtiapine (SEROquel) tablet 50 mg, 50 mg, Oral, HS, Donnie Benavidez PA-C    risperiDONE (RisperDAL M-TABS) dispersible tablet 1 mg, 1 mg, Oral, Q8H PRN, Jurgen Guido MD    theophylline (JEF-24) 24 hr capsule 200 mg, 200 mg, Oral, Daily, Artem Davidson MD, 200 mg at 06/08/19 0813    traZODone (DESYREL) tablet 25 mg, 25 mg, Oral, HS PRN, Artem Davidson MD    tuberculin injection 5 Units, 5 Units, Intradermal, Once, Tamy Luke MD, Stopped at 05/22/19 1425    Current Problem List:    Patient Active Problem List   Diagnosis    [address]is [facility] [address]    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[facility] [address] [facility]    Tobacco use disorder, continuous    Bipolar disorder ([facility] [address] )    Left hip pain    Lactic acidosis    Hypokalemia    Hypomagnesemia    Compression fracture of L4 lumbar vertebra    Thoracic compression fracture (HCC)    Ventral hernia    Parapneumonic effusion    Acute on chronic respiratory failure with hypoxia (HCC)    Chronic respiratory failure (HCC)    Hypophosphatemia    Elevated MCV    Compression deformity of vertebra    Schizoaffective disorder, bipolar type (HCC)    Acquired hypothyroidism    Gastroesophageal reflux disease without esophagitis    Abnormal CT of the chest    Excessive cerumen in left ear canal    Lipoma of right upper extremity    Polydipsia    Localized swelling of both lower legs       Problem list reviewed 06/09/19     Objective:     Vital Signs:  Vitals:    06/08/19 0713 06/08/19 0730 06/08/19 1611 06/08/19 2100   BP: (!) 83/51 110/68 106/56 106/55   BP Location: Left arm Left arm Left arm Right arm   Pulse:   91 82   Resp: 20  16 16   Temp: (!) 97 1 °F (36 2 °C)  98 4 °F (36 9 °C)    TempSrc: Temporal  Tympanic    SpO2: 96%  95%    Weight:       Height:             Appearance:  age appropriate and casually dressed   Behavior:  normal   Speech:  normal volume   Mood:  anxious   Affect:  constricted   Thought Process:  normal   Thought Content:  delusions  somatic   Perceptual Disturbances: None   Risk Potential: none   Sensorium:  person, place and time   Cognition:  intact   Consciousness:  alert and awake    Attention: attention span and concentration were age appropriate   Intellect: average   Insight:  limited   Judgment: limited      Motor Activity: no abnormal movements       I/O Past 24 hours:  I/O last 3 completed shifts: In: 1080 [P O :1080]  Out: -   No intake/output data recorded  Labs:  Reviewed 06/09/19    Progress Toward Goals:  Unchanged, ongoing delusional thought content    Assessment / Plan:     Schizoaffective disorder, bipolar type ([facility] [address] )    Recommended Treatment:      Medication changes:  1) patient has been hypotensive, as result will decrease Seroquel to 50 mg at bedtime and monitor for tolerability prior to up titration    Non-pharmacological treatments  1) Continue with group therapy, milieu therapy and occupational therapy  Safety  1) Safety/communication plan established targeting dynamic risk factors above  2) Risks, benefits, and possible side effects of medications explained to patient and patient verbalizes understanding  Counseling / Coordination of Care    Total floor / unit time spent today 20 minutes   Greater than 50% of total time was spent with the patient and / or family counseling and / or coordination of care  A description of the counseling / coordination of care  Patient's Rights, confidentiality and exceptions to confidentiality, use of automated medical record, Jeb Patrick staff access to medical record, and consent to treatment reviewed      John Olivares PA-C

## 2019-06-10 PROCEDURE — 99232 SBSQ HOSP IP/OBS MODERATE 35: CPT | Performed by: NURSE PRACTITIONER

## 2019-06-10 RX ORDER — PALIPERIDONE 6 MG/1
12 TABLET, EXTENDED RELEASE ORAL DAILY
Status: DISCONTINUED | OUTPATIENT
Start: 2019-06-10 | End: 2019-06-11

## 2019-06-10 RX ORDER — OLANZAPINE 10 MG/1
10 INJECTION, POWDER, LYOPHILIZED, FOR SOLUTION INTRAMUSCULAR DAILY
Status: DISCONTINUED | OUTPATIENT
Start: 2019-06-10 | End: 2019-06-11

## 2019-06-10 RX ADMIN — PALIPERIDONE 12 MG: 6 TABLET, FILM COATED, EXTENDED RELEASE ORAL at 10:34

## 2019-06-10 RX ADMIN — LEVOTHYROXINE SODIUM 125 MCG: 125 TABLET ORAL at 05:49

## 2019-06-10 RX ADMIN — THEOPHYLLINE ANHYDROUS 200 MG: 200 CAPSULE, EXTENDED RELEASE ORAL at 08:49

## 2019-06-10 RX ADMIN — FLUTICASONE FUROATE AND VILANTEROL TRIFENATATE 1 PUFF: 200; 25 POWDER RESPIRATORY (INHALATION) at 07:59

## 2019-06-10 RX ADMIN — QUETIAPINE FUMARATE 50 MG: 50 TABLET, FILM COATED ORAL at 21:45

## 2019-06-10 RX ADMIN — PANTOPRAZOLE SODIUM 40 MG: 40 TABLET, DELAYED RELEASE ORAL at 05:49

## 2019-06-10 NOTE — CASE MANAGEMENT
Patient continues to be somatic and yelling out for staff  Patient continues to believe and be accusatory towards staff believing that they are trying to kill her  Phone conference will be held at 1:00pm with [facility], Juan Manuel Rodriguez, and SHASHI ACT to discuss possible admission to [facility]  CM will continue to follow and provide services as needed

## 2019-06-10 NOTE — PLAN OF CARE
Problem: Alteration in Thoughts and Perception  Goal: Treatment Goal: Gain control of psychotic behaviors/thinking, reduce/eliminate presenting symptoms and demonstrate improved reality functioning upon discharge  Outcome: Progressing  Goal: Verbalize thoughts and feelings  Description  Interventions:  - Promote a nonjudgmental and trusting relationship with the patient through active listening and therapeutic communication  - Assess patient's level of functioning, behavior and potential for risk  - Engage patient in 1 on 1 interactions for a minimum of 15 minutes each session  - Encourage patient to express fears, feelings, frustrations, and discuss symptoms    - [place] patient to reality, help patient recognize reality-based thinking   - Administer medications as ordered and assess for potential side effects  - Provide the patient education related to the signs and symptoms of the illness and desired effects of prescribed medications  Outcome: Progressing  Goal: Refrain from acting on delusional thinking/internal stimuli  Description  Interventions:  - Monitor patient closely, per order   - Utilize least restrictive measures   - Set reasonable limits, give positive feedback for acceptable   - Administer medications as ordered and monitor of potential side effects  Outcome: Progressing  Goal: Agree to be compliant with medication regime, as prescribed and report medication side effects  Description  Interventions:  - Offer appropriate PRN medication and supervise ingestion; conduct aims, as needed   Outcome: Progressing  Goal: Recognize dysfunctional thoughts, communicate reality-based thoughts at the time of discharge  Description  Interventions:  - Provide medication and psycho-education to assist patient in compliance and developing insight into his/her illness   Outcome: Progressing  Goal: Complete daily ADLs, including personal hygiene independently, as able  Description  Interventions:  - Observe, teach, and assist patient with ADLS  - Monitor and promote a balance of rest/activity, with adequate nutrition and elimination   Outcome: Progressing     Problem: Risk for Self Injury/Neglect  Goal: Treatment Goal: Remain safe during length of stay, learn and adopt new coping skills, and be free of self-injurious ideation, impulses and acts at the time of discharge  Outcome: Progressing  Goal: Verbalize thoughts and feelings  Description  Interventions:  - Assess and re-assess patient's lethality and potential for self-injury  - Engage patient in 1:1 interactions, daily, for a minimum of 15 minutes  - Encourage patient to express feelings, fears, frustrations, hopes  - Establish rapport/trust with patient   Outcome: Progressing  Goal: Refrain from harming self  Description  Interventions:  - Monitor patient closely, per order  - Develop a trusting relationship  - Supervise medication ingestion, monitor effects and side effects   Outcome: Progressing  Goal: Recognize maladaptive responses and adopt new coping mechanisms  Outcome: Progressing  Goal: Complete daily ADLs, including personal hygiene independently, as able  Description  Interventions:  - Observe, teach, and assist patient with ADLS  - Monitor and promote a balance of rest/activity, with adequate nutrition and elimination  Outcome: Progressing     Problem: Depression  Goal: Treatment Goal: Demonstrate behavioral control of depressive symptoms, verbalize feelings of improved mood/affect, and adopt new coping skills prior to discharge  Outcome: Progressing  Goal: Verbalize thoughts and feelings  Description  Interventions:  - Assess and re-assess patient's level of risk   - Engage patient in 1:1 interactions, daily, for a minimum of 15 minutes   - Encourage patient to express feelings, fears, frustrations, hopes   Outcome: Progressing  Goal: Refrain from harming self  Description  Interventions:  - Monitor patient closely, per order   - Supervise medication ingestion, monitor effects and side effects   Outcome: Progressing  Goal: Refrain from isolation  Description  Interventions:  - Develop a trusting relationship   - Encourage socialization   Outcome: Progressing  Goal: Refrain from self-neglect  Outcome: Progressing  Goal: Complete daily ADLs, including personal hygiene independently, as able  Description  Interventions:  - Observe, teach, and assist patient with ADLS  -  Monitor and promote a balance of rest/activity, with adequate nutrition and elimination   Outcome: Progressing     Problem: Anxiety  Goal: Anxiety is at manageable level  Description  Interventions:  - Assess and monitor patient's anxiety level  - Monitor for signs and symptoms of anxiety both physical and emotional (heart palpitations, chest pain, shortness of breath, headaches, nausea, feeling jumpy, restlessness, irritable, apprehensive)  - Collaborate with interdisciplinary team and initiate plan and interventions as ordered    - [place] patient to unit/surroundings  - Explain treatment plan  - Encourage participation in care  - Encourage verbalization of concerns/fears  - Identify coping mechanisms  - Assist in developing anxiety-reducing skills  - Administer/offer alternative therapies  - Limit or eliminate stimulants  Outcome: Progressing     Problem: Prexisting or High Potential for Compromised Skin Integrity  Goal: Skin integrity is maintained or improved  Description  INTERVENTIONS:  - Identify patients at risk for skin breakdown  - Assess and monitor skin integrity  - Assess and monitor nutrition and hydration status  - Monitor labs (i e  albumin)  - Assess for incontinence   - Turn and reposition patient  - Assist with mobility/ambulation  - Relieve pressure over bony prominences  - Avoid friction and shearing  - Provide appropriate hygiene as needed including keeping skin clean and dry  - Evaluate need for skin moisturizer/barrier cream  - Collaborate with interdisciplinary team (i e  Nutrition, Rehabilitation, etc )   - Patient/family teaching  Outcome: Progressing

## 2019-06-10 NOTE — NURSING NOTE
Patient isolative to self during evening hours  Refused to go to day room at snack time after encouragement " said she was afraid to " wet herself"  Patient said "it is a adverse reaction of the medication I take"  Patient was content about decreased dose of Seroquel to 50 mg instead of 100 mg, and was compliant with meds at bed time  No further issues  Patient is now resting in bed, and appears to be sleeping  Respirations even, unlabored  O2 nasal cannula in place   Will continue to monitor per protocol

## 2019-06-10 NOTE — NURSING NOTE
Patient initially refused her PO Invega stating that she does not like the way it makes her feel and she wanted to talk to Dr Zulay Renae regarding same before taking it  Patient is know to refuse medications in the past and is somatic on many occasions related to medications and side effects  GAYLA Stanley notified and new order to increase PO Invega to 12 mg PO daily and linked to a forced IM injection if she refuses the PO medication  Patient educated on the new orders and finally agreed to take the PO medication  Patient denies depressive or anxiety symptoms although she continues to isolate to her room in between meals and will not attend group activities despite staff encouragement to attend group and stay out of room more often  No other issues noted at this time

## 2019-06-10 NOTE — PROGRESS NOTES
Progress Note - Behavioral Health   Niyah Ortiz 66 y o  female MRN: [id number]  Unit/Bed#: Rosal[facility] Watkins 297-47 Encounter: [id number]    The patient was seen for continuing care and reviewed with treatment team  Staff reports that the patient remains cooperative, visible, but only selectively compliant with medications  Patient continues to remain somatic with multiple medication trials  Medication over objection order was placed  During assessment the patient remains paranoid, delusional, and somatic with multiple complaints regarding health and care  Remains superficial and guarded with issues  Denies any depressive or anxiety symptoms   Denies any thoughts to hurt herself or others, but feels that "a microchip transmitter was placed in her arm to do what her wifey-poo tells her to do "     Mental Status Evaluation:  Appearance:  Marginal/poor hygiene   Behavior:  cooperative, guarded and Superficial   Fund of knowledge  aware of current events and Aware of past history   Speech:   Language: Normal rate and Normal volume  No overt abnormality   Mood:  irritable   Affect:   Associations: labile  Intact   Thought Process:  Circumstantial   Thought Content:  Paranoid and mistrustful, Delusions of persecution, Grandiose delusions and Obsessive ruminations   Perceptual Disturbances: Denies hallucinations and does not appear to be responding to internal stimuli   Risk Potential: No suicidal or homicidal ideation   Orientation  Oriented x 3   Memory No deficits   Attention/Concentration attention span appeared shorter than expected for age   Insight:  No insight   Judgment: Poor judgment   Gait/Station: normal gait/station and normal balance   Motor Activity: No abnormal movement noted     Progress Toward Goals: unchanged    Assessment/Plan    Principal Problem:    Schizoaffective disorder, bipolar type ([facility] )  Active Problems:    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[facility] )    Acquired hypothyroidism    Gastroesophageal reflux disease without esophagitis      Recommended Treatment: Continue with pharmacotherapy, group therapy, milieu therapy and occupational therapy  The patient will be maintained on the following medications:    Current Facility-Administered Medications:  acetaminophen 650 mg Oral Q6H PRN Elli Sharpe MD   albuterol 2 puff Inhalation Q4H PRN Maria Luisa Shade, CRNP   aluminum-magnesium hydroxide-simethicone 15 mL Oral Q4H PRN Elio Summers MD   ammonium lactate  Topical BID PRN Maria Luisa Shade, CRNP   benzonatate 100 mg Oral TID PRN Elli Sharpe MD   benztropine 1 mg Intramuscular Q8H PRN Elio Summers MD   benztropine 1 mg Oral Q8H PRN Eloi Summers MD   enoxaparin 40 mg Subcutaneous Daily Elli Sharpe MD   fluticasone-vilanterol 1 puff Inhalation Daily Gail Urias MD   haloperidol 1 mg Oral Q6H PRN Nelida Vilchis MD   haloperidol lactate 2 mg Intramuscular Q6H PRN Nelida Vilchis MD   hydrOXYzine HCL 25 mg Oral Q6H PRN Elio Summers MD   levothyroxine 125 mcg Oral Early Morning Elli Sharpe MD   magnesium hydroxide 30 mL Oral Daily PRN Rachelle Greenberg MD   paliperidone 12 mg Oral Daily Maria Luisa Shade, CRNP   Or       OLANZapine 10 mg Intramuscular Daily Maria Luisa Shade, CRNP   pantoprazole 40 mg Oral Early Morning Elli Sharpe MD   polyethylene glycol 17 g Oral Daily PRN Maria Luisa Shade, CRNP   QUEtiapine 50 mg Oral HS Leonel Guzman PA-C   risperiDONE 1 mg Oral Q8H PRN Elio Summers MD   theophylline 200 mg Oral Daily Elli Sharpe MD   traZODone 25 mg Oral HS PRN Elli Sharpe MD   tuberculin 5 Units Intradermal Once Nelida Vilchis MD       Risks, benefits and possible side effects of Medications:   Risks, benefits, and possible side effects of medications explained to patient and patient verbalizes understanding

## 2019-06-11 PROCEDURE — 97530 THERAPEUTIC ACTIVITIES: CPT

## 2019-06-11 PROCEDURE — 99232 SBSQ HOSP IP/OBS MODERATE 35: CPT | Performed by: NURSE PRACTITIONER

## 2019-06-11 RX ORDER — OLANZAPINE 10 MG/1
10 INJECTION, POWDER, LYOPHILIZED, FOR SOLUTION INTRAMUSCULAR DAILY
Status: DISCONTINUED | OUTPATIENT
Start: 2019-06-12 | End: 2019-06-12

## 2019-06-11 RX ADMIN — PANTOPRAZOLE SODIUM 40 MG: 40 TABLET, DELAYED RELEASE ORAL at 05:45

## 2019-06-11 RX ADMIN — QUETIAPINE FUMARATE 50 MG: 50 TABLET, FILM COATED ORAL at 22:41

## 2019-06-11 RX ADMIN — FLUTICASONE FUROATE AND VILANTEROL TRIFENATATE 1 PUFF: 200; 25 POWDER RESPIRATORY (INHALATION) at 08:04

## 2019-06-11 RX ADMIN — THEOPHYLLINE ANHYDROUS 200 MG: 200 CAPSULE, EXTENDED RELEASE ORAL at 08:04

## 2019-06-11 RX ADMIN — PALIPERIDONE 12 MG: 6 TABLET, FILM COATED, EXTENDED RELEASE ORAL at 08:04

## 2019-06-11 RX ADMIN — LEVOTHYROXINE SODIUM 125 MCG: 125 TABLET ORAL at 05:45

## 2019-06-11 NOTE — PROGRESS NOTES
Currently appears to be resting calmly in bed  Eyes closed and O2 on at 2L via nasal cannula  PO 98 %   Monitored on Q 7 minute safety checks

## 2019-06-11 NOTE — PROGRESS NOTES
Pt present on the unit  Continues on O2 2L  Lynita Ped Pt relatively calm throughout this shift  Pt out of room for meals  No somatic complains offered today  Compliant with medication

## 2019-06-11 NOTE — PLAN OF CARE
Problem: OCCUPATIONAL THERAPY ADULT  Goal: Performs self-care activities at highest level of function for planned discharge setting  See evaluation for individualized goals  Description  Treatment Interventions: ADL retraining, Endurance training, Continued evaluation, Activityengagement(coping skills, life management, reality focus)          See flowsheet documentation for full assessment, interventions and recommendations  Outcome: Progressing  Note:   Limitation: Decreased ADL status, Decreased high-level ADLs, Mood limitation(guarded, limited coping and life management skills)  Prognosis: Fair  Assessment: Richmond Jaramillo wanted to meet with this writer today  We met at her bedside  She was pleasant, talkative  She did talk about her [place], she stated that this has contributed to her feeling unsteady when she tries to walk, thus she has stayed in her bed more  She stated that she does go out for meals, she already does not like that she needs the O2 and now feeling woozy even more limits her independence  She was encouraged to try more involvement in program  She stated that she has not felt like doing things, but she was encouraged to try even for a little bit because sometimes actually doing things helps one feel more motivated to do them  She was pleasant, talkative, she was provided with handout on positive affirmations and encouraged to pick out at least 3 words which she feels are reflective of her  She did appear to like this worksheet  She also talked about having a battery in her arm, that when she is in the day room she is concerned that the lights will affect the lithium in the battery  Progress has been slow at best towards her goals  However, when she has been willing to engage in OT activity, she has been pleasant and positive  Continue to promote positive focus via OT assignment

## 2019-06-11 NOTE — PROGRESS NOTES
Patient compliant with Invega without resistance   Very  somatic after taking Invega  Currently in bed resting   negative   paranoid   delusional  isolative to her room  Denies suicidal ideation   Med and meal compliant   Will continue to monitor

## 2019-06-11 NOTE — OCCUPATIONAL THERAPY NOTE
Occupational Therapy Activity Treatment Note      Pradeep Crowley    6/11/2019    Patient Active Problem List   Diagnosis    Sepsis ([facility] [address]    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[facility] [address] [facility]    Tobacco use disorder, continuous    Bipolar disorder ([facility] [address] [facility]    Left hip pain    Lactic acidosis    Hypokalemia    Hypomagnesemia    Compression fracture of L4 lumbar vertebra    Thoracic compression fracture (HCC)    Ventral hernia    Parapneumonic effusion    Acute on chronic respiratory failure with hypoxia (HCC)    Chronic respiratory failure (HCC)    Hypophosphatemia    Elevated MCV    Compression deformity of vertebra    Schizoaffective disorder, bipolar type ([facility] [address] [facility]    Acquired hypothyroidism    Gastroesophageal reflux disease without esophagitis    Abnormal CT of the chest    Excessive cerumen in left ear canal    Lipoma of right upper extremity    Polydipsia    Localized swelling of both lower legs       Past Medical History:   Diagnosis Date    Acid reflux     Anxiety     Asthma     Bipolar 1 disorder (HCC)     Chronic pain disorder     Chronic respiratory failure (HCC)     Compression fracture of fourth lumbar vertebra (HCC)     COPD (chronic obstructive pulmonary disease) (HCC)     Depression     GERD (gastroesophageal reflux disease)     History of home oxygen therapy     Hypothyroidism     Lipoma of upper extremity     Psychiatric illness     Schizoaffective disorder ([facility] [address] [facility]     Substance abuse ([facility] [address] )     Nicotine    Thoracic compression fracture ([facility] [address] )     Ventral hernia        No past surgical history on file      06/11/19 6418   Assessment   Assessment Patsy wanted to meet with this writer today  We met at her bedside  She was pleasant, talkative  She did talk about her [place], she stated that this has contributed to her feeling unsteady when she tries to walk, thus she has stayed in her bed more   She stated that she does go out for meals, she already does not like that she needs the O2 and now feeling woozy even more limits her independence  She was encouraged to try more involvement in program  She stated that she has not felt like doing things, but she was encouraged to try even for a little bit because sometimes actually doing things helps one feel more motivated to do them  She was pleasant, talkative, she was provided with handout on positive affirmations and encouraged to pick out at least 3 words which she feels are reflective of her  She did appear to like this worksheet  She also talked about having a battery in her arm, that when she is in the day room she is concerned that the lights will affect the lithium in the battery  Progress has been slow at best towards her goals  However, when she has been willing to engage in OT activity, she has been pleasant and positive  Continue to promote positive focus via OT assignment  Plan   Treatment Interventions ADL retraining; Endurance training;Continued evaluation; Activityengagement  (coping skills, life management, reality focus)   Goal Expiration Date 06/06/19   Treatment Day 30   OT Frequency 2-3x/wk   Frank Gonzales, OT

## 2019-06-11 NOTE — PROGRESS NOTES
Progress Note - Aspen Crawford [age] y o  female MRN: [id number]  Unit/Bed#: [facility] 072-40 Encounter: [id number]    The patient was seen for continuing care and reviewed with treatment team  Staff reports that the patient continues to remain paranoid, suspicious, and irritable at times  Medication compliant with encouragement  Patient requires involuntary medications for compliance  During assessment the patient remains paranoid and somatically preoccupied  Denies any thoughts to hurt herself or others  Reports delusional thoughts and remains disorganized and tangential in conversation  Patient reports increased side effects from the recent increase in [place] and appears slightly sedated  Will taper Invega to 9mg and maintain dose        Mental Status Evaluation:  Appearance:  Marginal/poor hygiene   Behavior:  guarded, argumentative and Superficial   Fund of knowledge  aware of current events and Aware of past history   Speech:   Language: Normal rate and Normal volume  No overt abnormality   Mood:  irritable and anxious   Affect:   Associations: blunted  Intact   Thought Process:  Circumstantial, Tangential and disorganized   Thought Content:  Paranoid and mistrustful, Delusions of persecution and is somatically preoccupied   Perceptual Disturbances: Denies hallucinations and does not appear to be responding to internal stimuli   Risk Potential: No suicidal or homicidal ideation   Orientation  Oriented x 3   Memory No deficits   Attention/Concentration attention span appeared shorter than expected for age   Insight:  No insight   Judgment: Poor judgment   Gait/Station: Not observed   Motor Activity: No abnormal movement noted     Progress Toward Goals: unchanged    Assessment/Plan    Principal Problem:    Schizoaffective disorder, bipolar type ([facility] )  Active Problems:    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[facility] )    Acquired hypothyroidism    Gastroesophageal reflux disease without esophagitis      Recommended Treatment: Continue with pharmacotherapy, group therapy, milieu therapy and occupational therapy  The patient will be maintained on the following medications:    Current Facility-Administered Medications:  acetaminophen 650 mg Oral Q6H PRN Anu Gomez MD   albuterol 2 puff Inhalation Q4H PRN ABILIO Germain   aluminum-magnesium hydroxide-simethicone 15 mL Oral Q4H PRN Ginette Chavarria MD   ammonium lactate  Topical BID PRN ABILIO Germain   benzonatate 100 mg Oral TID PRN Anu Gomez MD   benztropine 1 mg Intramuscular Q8H PRN Ginette Chavarria MD   benztropine 1 mg Oral Q8H PRN Ginette Chavarria MD   enoxaparin 40 mg Subcutaneous Daily Anu Gomez MD   fluticasone-vilanterol 1 puff Inhalation Daily Adrianna Bal MD   haloperidol 1 mg Oral Q6H PRN Soren Ortega MD   haloperidol lactate 2 mg Intramuscular Q6H PRN Soren Ortega MD   hydrOXYzine HCL 25 mg Oral Q6H PRN Ginette Chavarria MD   levothyroxine 125 mcg Oral Early Morning Anu Gomez MD   magnesium hydroxide 30 mL Oral Daily PRN Alyssa Mccabe MD   [START ON 6/12/2019] paliperidone 9 mg Oral Daily ABILIO Germain   Or       [START ON 6/12/2019] OLANZapine 10 mg Intramuscular Daily ABILIO Germain   pantoprazole 40 mg Oral Early Morning Anu Gomez MD   polyethylene glycol 17 g Oral Daily PRN ABILIO Germain   QUEtiapine 50 mg Oral HS Anyi Mcfarlane PA-C   risperiDONE 1 mg Oral Q8H PRN Ginette Chavarria MD   theophylline 200 mg Oral Daily Anu Gomez MD   traZODone 25 mg Oral HS PRN Anu Gomez MD   tuberculin 5 Units Intradermal Once Soren Ortega MD       Risks, benefits and possible side effects of Medications:   Risks, benefits, and possible side effects of medications explained to patient and patient verbalizes understanding

## 2019-06-11 NOTE — PLAN OF CARE
Problem: Alteration in Thoughts and Perception  Goal: Treatment Goal: Gain control of psychotic behaviors/thinking, reduce/eliminate presenting symptoms and demonstrate improved reality functioning upon discharge  Outcome: Progressing  Goal: Verbalize thoughts and feelings  Description  Interventions:  - Promote a nonjudgmental and trusting relationship with the patient through active listening and therapeutic communication  - Assess patient's level of functioning, behavior and potential for risk  - Engage patient in 1 on 1 interactions for a minimum of 15 minutes each session  - Encourage patient to express fears, feelings, frustrations, and discuss symptoms    - [place] patient to reality, help patient recognize reality-based thinking   - Administer medications as ordered and assess for potential side effects  - Provide the patient education related to the signs and symptoms of the illness and desired effects of prescribed medications  Outcome: Progressing  Goal: Refrain from acting on delusional thinking/internal stimuli  Description  Interventions:  - Monitor patient closely, per order   - Utilize least restrictive measures   - Set reasonable limits, give positive feedback for acceptable   - Administer medications as ordered and monitor of potential side effects  Outcome: Progressing  Goal: Agree to be compliant with medication regime, as prescribed and report medication side effects  Description  Interventions:  - Offer appropriate PRN medication and supervise ingestion; conduct aims, as needed   Outcome: Progressing  Goal: Attend and participate in unit activities, including therapeutic, recreational, and educational groups  Description  Interventions:  - Provide therapeutic and educational activities daily, encourage attendance and participation, and document same in the medical record   Outcome: Progressing  Goal: Recognize dysfunctional thoughts, communicate reality-based thoughts at the time of discharge  Description  Interventions:  - Provide medication and psycho-education to assist patient in compliance and developing insight into his/her illness   Outcome: Progressing  Goal: Complete daily ADLs, including personal hygiene independently, as able  Description  Interventions:  - Observe, teach, and assist patient with ADLS  - Monitor and promote a balance of rest/activity, with adequate nutrition and elimination   Outcome: Progressing     Problem: Risk for Self Injury/Neglect  Goal: Treatment Goal: Remain safe during length of stay, learn and adopt new coping skills, and be free of self-injurious ideation, impulses and acts at the time of discharge  Outcome: Progressing  Goal: Verbalize thoughts and feelings  Description  Interventions:  - Assess and re-assess patient's lethality and potential for self-injury  - Engage patient in 1:1 interactions, daily, for a minimum of 15 minutes  - Encourage patient to express feelings, fears, frustrations, hopes  - Establish rapport/trust with patient   Outcome: Progressing  Goal: Refrain from harming self  Description  Interventions:  - Monitor patient closely, per order  - Develop a trusting relationship  - Supervise medication ingestion, monitor effects and side effects   Outcome: Progressing  Goal: Attend and participate in unit activities, including therapeutic, recreational, and educational groups  Description  Interventions:  - Provide therapeutic and educational activities daily, encourage attendance and participation, and document same in the medical record  - Obtain collateral information, encourage visitation and family involvement in care   Outcome: Progressing  Goal: Recognize maladaptive responses and adopt new coping mechanisms  Outcome: Progressing  Goal: Complete daily ADLs, including personal hygiene independently, as able  Description  Interventions:  - Observe, teach, and assist patient with ADLS  - Monitor and promote a balance of rest/activity, with adequate nutrition and elimination  Outcome: Progressing     Problem: Depression  Goal: Verbalize thoughts and feelings  Description  Interventions:  - Assess and re-assess patient's level of risk   - Engage patient in 1:1 interactions, daily, for a minimum of 15 minutes   - Encourage patient to express feelings, fears, frustrations, hopes   Outcome: Progressing  Goal: Refrain from harming self  Description  Interventions:  - Monitor patient closely, per order   - Supervise medication ingestion, monitor effects and side effects   Outcome: Progressing  Goal: Refrain from isolation  Description  Interventions:  - Develop a trusting relationship   - Encourage socialization   Outcome: Progressing  Goal: Refrain from self-neglect  Outcome: Progressing  Goal: Attend and participate in unit activities, including therapeutic, recreational, and educational groups  Description  Interventions:  - Provide therapeutic and educational activities daily, encourage attendance and participation, and document same in the medical record   Outcome: Progressing  Goal: Complete daily ADLs, including personal hygiene independently, as able  Description  Interventions:  - Observe, teach, and assist patient with ADLS  -  Monitor and promote a balance of rest/activity, with adequate nutrition and elimination   Outcome: Progressing     Problem: Anxiety  Goal: Anxiety is at manageable level  Description  Interventions:  - Assess and monitor patient's anxiety level  - Monitor for signs and symptoms of anxiety both physical and emotional (heart palpitations, chest pain, shortness of breath, headaches, nausea, feeling jumpy, restlessness, irritable, apprehensive)  - Collaborate with interdisciplinary team and initiate plan and interventions as ordered    - [place] patient to unit/surroundings  - Explain treatment plan  - Encourage participation in care  - Encourage verbalization of concerns/fears  - Identify coping mechanisms  - Assist in developing anxiety-reducing skills  - Administer/offer alternative therapies  - Limit or eliminate stimulants  Outcome: Progressing     Problem: Prexisting or High Potential for Compromised Skin Integrity  Goal: Skin integrity is maintained or improved  Description  INTERVENTIONS:  - Identify patients at risk for skin breakdown  - Assess and monitor skin integrity  - Assess and monitor nutrition and hydration status  - Monitor labs (i e  albumin)  - Assess for incontinence   - Turn and reposition patient  - Assist with mobility/ambulation  - Relieve pressure over bony prominences  - Avoid friction and shearing  - Provide appropriate hygiene as needed including keeping skin clean and dry  - Evaluate need for skin moisturizer/barrier cream  - Collaborate with interdisciplinary team (i e  Nutrition, Rehabilitation, etc )   - Patient/family teaching  Outcome: Progressing

## 2019-06-12 PROCEDURE — 99232 SBSQ HOSP IP/OBS MODERATE 35: CPT | Performed by: NURSE PRACTITIONER

## 2019-06-12 RX ORDER — PALIPERIDONE 6 MG/1
12 TABLET, EXTENDED RELEASE ORAL DAILY
Status: DISCONTINUED | OUTPATIENT
Start: 2019-06-13 | End: 2019-07-12

## 2019-06-12 RX ORDER — OLANZAPINE 10 MG/1
10 INJECTION, POWDER, LYOPHILIZED, FOR SOLUTION INTRAMUSCULAR DAILY
Status: DISCONTINUED | OUTPATIENT
Start: 2019-06-13 | End: 2019-07-12

## 2019-06-12 RX ADMIN — FLUTICASONE FUROATE AND VILANTEROL TRIFENATATE 1 PUFF: 200; 25 POWDER RESPIRATORY (INHALATION) at 08:50

## 2019-06-12 RX ADMIN — THEOPHYLLINE ANHYDROUS 200 MG: 200 CAPSULE, EXTENDED RELEASE ORAL at 08:49

## 2019-06-12 RX ADMIN — QUETIAPINE FUMARATE 50 MG: 50 TABLET, FILM COATED ORAL at 21:43

## 2019-06-12 RX ADMIN — PANTOPRAZOLE SODIUM 40 MG: 40 TABLET, DELAYED RELEASE ORAL at 05:43

## 2019-06-12 RX ADMIN — PALIPERIDONE 9 MG: 3 TABLET, EXTENDED RELEASE ORAL at 08:49

## 2019-06-12 RX ADMIN — LEVOTHYROXINE SODIUM 125 MCG: 125 TABLET ORAL at 05:43

## 2019-06-12 NOTE — PROGRESS NOTES
Progress Note - Behavioral Health   Robby Trujillo 66 y o  female MRN: [id number]  Unit/Bed#: [facility]-30 Encounter: [id number]    The patient was seen for continuing care and reviewed with treatment team  Staff reports that the patient remains paranoid, suspicious, and somatic with health concerns, especially oxygen  Remains medication compliant  Attending select groups  During assessment the patient is appropriate in conversation, but is irritable and focused on medication administration  Reports, "I don't feel like any medication can help me  I can only be on it for awhile and then it doesn't work anymore " Patient remains irritable, suspicious, and paranoid  Delusional about her sister and feels that she continues to have an affair with her boyfriend  Reports that she is sleeping and eating well, but will only accept food from certain staff members       Mental Status Evaluation:  Appearance:  Marginal/poor hygiene   Behavior:  guarded and Superficial   Fund of knowledge  aware of current events and Aware of past history   Speech:   Language: Normal rate and Normal volume  No overt abnormality   Mood:  irritable   Affect:   Associations: blunted  Intact   Thought Process:  Circumstantial   Thought Content:  Paranoid and mistrustful, Delusions of persecution and is somatically preoccupied   Perceptual Disturbances: Denies hallucinations and does not appear to be responding to internal stimuli   Risk Potential: No suicidal or homicidal ideation   Orientation  Oriented x 3   Memory No deficits   Attention/Concentration attention span appeared shorter than expected for age   Insight:  No insight   Judgment: Poor judgment   Gait/Station: Not observed   Motor Activity: No abnormal movement noted     Progress Toward Goals: unchanged    Assessment/Plan    Principal Problem:    Schizoaffective disorder, bipolar type ([facility] )  Active Problems:    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[facility] )    Acquired hypothyroidism    Gastroesophageal reflux disease without esophagitis      Recommended Treatment: Continue with pharmacotherapy, group therapy, milieu therapy and occupational therapy  The patient will be maintained on the following medications:    Current Facility-Administered Medications:  acetaminophen 650 mg Oral Q6H PRN Felipa Johns MD   albuterol 2 puff Inhalation Q4H PRN ABILIO Cain   aluminum-magnesium hydroxide-simethicone 15 mL Oral Q4H PRN Eva Ambriz MD   ammonium lactate  Topical BID PRN ABILIO Cain   benzonatate 100 mg Oral TID PRN Felipa Johns MD   benztropine 1 mg Intramuscular Q8H PRN Eva Ambriz MD   benztropine 1 mg Oral Q8H PRN Eva Ambriz MD   enoxaparin 40 mg Subcutaneous Daily Felipa Johns MD   fluticasone-vilanterol 1 puff Inhalation Daily Ulyses Boas, MD   haloperidol 1 mg Oral Q6H PRN Otilio Cox MD   haloperidol lactate 2 mg Intramuscular Q6H PRN Otilio Cox MD   hydrOXYzine HCL 25 mg Oral Q6H PRN Eva Ambriz MD   levothyroxine 125 mcg Oral Early Morning Felipa Johns MD   magnesium hydroxide 30 mL Oral Daily PRN Warren Camilo MD   [START ON 6/13/2019] paliperidone 12 mg Oral Daily ABILIO Cain   Or       [START ON 6/13/2019] OLANZapine 10 mg Intramuscular Daily ABILIO Cain   pantoprazole 40 mg Oral Early Morning Felipa Johns MD   polyethylene glycol 17 g Oral Daily PRN ABILIO Cain   QUEtiapine 50 mg Oral HS Gabriela Briggs PA-C   risperiDONE 1 mg Oral Q8H PRN Eva Ambriz MD   theophylline 200 mg Oral Daily Felipa Johns MD   traZODone 25 mg Oral HS PRN Felipa Johns MD   tuberculin 5 Units Intradermal Once Otilio Cox MD       Risks, benefits and possible side effects of Medications:   Risks, benefits, and possible side effects of medications explained to patient and patient verbalizes understanding

## 2019-06-12 NOTE — PLAN OF CARE
Problem: Alteration in Thoughts and Perception  Goal: Verbalize thoughts and feelings  Description  Interventions:  - Promote a nonjudgmental and trusting relationship with the patient through active listening and therapeutic communication  - Assess patient's level of functioning, behavior and potential for risk  - Engage patient in 1 on 1 interactions for a minimum of 15 minutes each session  - Encourage patient to express fears, feelings, frustrations, and discuss symptoms    - [place] patient to reality, help patient recognize reality-based thinking   - Administer medications as ordered and assess for potential side effects  - Provide the patient education related to the signs and symptoms of the illness and desired effects of prescribed medications  Outcome: Progressing  Goal: Refrain from acting on delusional thinking/internal stimuli  Description  Interventions:  - Monitor patient closely, per order   - Utilize least restrictive measures   - Set reasonable limits, give positive feedback for acceptable   - Administer medications as ordered and monitor of potential side effects  Outcome: Progressing  Goal: Agree to be compliant with medication regime, as prescribed and report medication side effects  Description  Interventions:  - Offer appropriate PRN medication and supervise ingestion; conduct aims, as needed   Outcome: Progressing  Goal: Recognize dysfunctional thoughts, communicate reality-based thoughts at the time of discharge  Description  Interventions:  - Provide medication and psycho-education to assist patient in compliance and developing insight into his/her illness   Outcome: Progressing  Goal: Complete daily ADLs, including personal hygiene independently, as able  Description  Interventions:  - Observe, teach, and assist patient with ADLS  - Monitor and promote a balance of rest/activity, with adequate nutrition and elimination   Outcome: Progressing     Problem: Risk for Self Injury/Neglect  Goal: Treatment Goal: Remain safe during length of stay, learn and adopt new coping skills, and be free of self-injurious ideation, impulses and acts at the time of discharge  Outcome: Progressing  Goal: Verbalize thoughts and feelings  Description  Interventions:  - Assess and re-assess patient's lethality and potential for self-injury  - Engage patient in 1:1 interactions, daily, for a minimum of 15 minutes  - Encourage patient to express feelings, fears, frustrations, hopes  - Establish rapport/trust with patient   Outcome: Progressing  Goal: Refrain from harming self  Description  Interventions:  - Monitor patient closely, per order  - Develop a trusting relationship  - Supervise medication ingestion, monitor effects and side effects   Outcome: Progressing  Goal: Recognize maladaptive responses and adopt new coping mechanisms  Outcome: Progressing  Goal: Complete daily ADLs, including personal hygiene independently, as able  Description  Interventions:  - Observe, teach, and assist patient with ADLS  - Monitor and promote a balance of rest/activity, with adequate nutrition and elimination  Outcome: Progressing     Problem: Depression  Goal: Treatment Goal: Demonstrate behavioral control of depressive symptoms, verbalize feelings of improved mood/affect, and adopt new coping skills prior to discharge  Outcome: Progressing  Goal: Verbalize thoughts and feelings  Description  Interventions:  - Assess and re-assess patient's level of risk   - Engage patient in 1:1 interactions, daily, for a minimum of 15 minutes   - Encourage patient to express feelings, fears, frustrations, hopes   Outcome: Progressing  Goal: Refrain from harming self  Description  Interventions:  - Monitor patient closely, per order   - Supervise medication ingestion, monitor effects and side effects   Outcome: Progressing  Goal: Refrain from isolation  Description  Interventions:  - Develop a trusting relationship   - Encourage socialization   Outcome: Progressing  Goal: Refrain from self-neglect  Outcome: Progressing  Goal: Complete daily ADLs, including personal hygiene independently, as able  Description  Interventions:  - Observe, teach, and assist patient with ADLS  -  Monitor and promote a balance of rest/activity, with adequate nutrition and elimination   Outcome: Progressing     Problem: Anxiety  Goal: Anxiety is at manageable level  Description  Interventions:  - Assess and monitor patient's anxiety level  - Monitor for signs and symptoms of anxiety both physical and emotional (heart palpitations, chest pain, shortness of breath, headaches, nausea, feeling jumpy, restlessness, irritable, apprehensive)  - Collaborate with interdisciplinary team and initiate plan and interventions as ordered    - [place] patient to unit/surroundings  - Explain treatment plan  - Encourage participation in care  - Encourage verbalization of concerns/fears  - Identify coping mechanisms  - Assist in developing anxiety-reducing skills  - Administer/offer alternative therapies  - Limit or eliminate stimulants  Outcome: Progressing     Problem: Prexisting or High Potential for Compromised Skin Integrity  Goal: Skin integrity is maintained or improved  Description  INTERVENTIONS:  - Identify patients at risk for skin breakdown  - Assess and monitor skin integrity  - Assess and monitor nutrition and hydration status  - Monitor labs (i e  albumin)  - Assess for incontinence   - Turn and reposition patient  - Assist with mobility/ambulation  - Relieve pressure over bony prominences  - Avoid friction and shearing  - Provide appropriate hygiene as needed including keeping skin clean and dry  - Evaluate need for skin moisturizer/barrier cream  - Collaborate with interdisciplinary team (i e  Nutrition, Rehabilitation, etc )   - Patient/family teaching  Outcome: Progressing

## 2019-06-12 NOTE — PROGRESS NOTES
Patient  Very judgemental   Patient has one to one conversation  with RN several times still not satisfied   Attention seeking   O 2 2 L continued  Very somatic   Denies suicidal ideation /VH/AH  Med and meal compliant   Refused group states '' I am very tired '' will continue  to monitor

## 2019-06-12 NOTE — PROGRESS NOTES
Monitored on Q 7 minute safety checks  Currently in bed with eyes closed, O2 at 2 L via nasal cannula   Appears to be resting calmly  Not voicing any complaints

## 2019-06-12 NOTE — PROGRESS NOTES
Pt observed on the unit  Pt continues on 2L O2  Pt compliant with O2 use  Pt appeared suspicious  with HS medication  Pt requested more time to reflect on Seroquel dose at HS  Pt educated on medication regimen and Pt accepted dose later  Pt denies s/s and was in room most of the time

## 2019-06-12 NOTE — CASE MANAGEMENT
EAC referral successfully faxed to [facility] Adenike Phillips), Fartun Rangel [facility]), and given to Richard Woodward [facility])  CM will continue to follow and provide services as needed

## 2019-06-12 NOTE — PROGRESS NOTES
Progress Note - Behavioral Health     Haily Holloway 66 y o  female MRN: [id number]  Unit/Bed#: Monda Seip 156-69 Encounter: [id number]    Haily Holloway was seen for continuing care and reviewed with treatment team   Pt has h/o group home living, admitted on a 302 commitment for psychotic behavior, poor ADL and noncompliance to medications  She was in bed on continuous O2 on my arrival and  reports feeling "Tired" which then makes her depressed and anxious but she presently denies SI, HI or hallucinations  She is a bit tangential at times, talking about her past   She states her present "Paranoia is of just cause" and refers to the belief that her son does not want to see her because he has not called her much recently  She does not volunteer information regarding her sister in law, but when asked, she states "Maybe" her sister in law had an affair with her   Floor team relayed that Pt is on medications over objection due to noncompliance  She is more compliant in recent days --both with medications and O2 use  She has been irritable on unit and isolative, though she was social last evening at dinner  She does not attend groups  The OT is seeing the Pt regularly to improve ADL   is arranging for the [facility]  Pt verbalized to this writer that she does not want to be involved with [employer] anymore  Sleep:Adequate  Appetite: Good   Medication side effects: None per Pt    ROS: No complaints per Pt    Labs/EKG/CXR : Reviewed    Mental Status Evaluation:  Appearance:  Dressed, Fair hygiene, Good eye contact   Behavior:  Calm, cooperative, pleasant   Speech:  Clear, normal rate and volume but pressured   Mood:  Anxious, Depressed   Affect:  Fair range   Thought Process:  Circumstantial, somewhat tangential at times, paranoid   Associations: Some tangential associations   Thought Content:  Paranoid delusions   Perceptual Disturbances: Pt admits to some paranoia    She does not appear to be responding to internal stimuli   Risk Potential: Pt presently denies SI or HI    Sensorium:  Self, birthday, Place, Day of the week, Month, Year   Memory:  short term memory grossly intact   Consciousness:  alert, awake   Attention: Good   Insight:  Limited   Judgment: Limited   Gait/Station: Normal gait/station   Motor Activity: No abnormal movements     Vitals:    06/12/19 0651 06/12/19 1515 06/12/19 2139 06/13/19 0658   BP: 98/68 96/55 118/68 110/62   BP Location: Right arm Left arm Left arm Left arm   Pulse: 78 84 89 85   Resp: 17 17 17 18   Temp: 97 7 °F (36 5 °C) (!) 97 4 °F (36 3 °C) 98 3 °F (36 8 °C) 97 6 °F (36 4 °C)   TempSrc: Temporal Tympanic Temporal Temporal   SpO2: 97% 95% 95% 98%   Weight:       Height:           Admission on 05/05/2019   Component Date Value    POC Glucose 05/10/2019 156*    Clarity, UA 05/29/2019 Clear     Color, UA 05/29/2019 Straw     Specific [place], UA 05/29/2019 1 015     pH, UA 05/29/2019 8 0     Glucose, UA 05/29/2019 Negative     Ketones, UA 05/29/2019 Negative     Blood, UA 05/29/2019 Negative     Protein, UA 05/29/2019 Negative     Nitrite, UA 05/29/2019 Negative     Bilirubin, UA 05/29/2019 Negative     Leukocytes, UA 05/29/2019 Negative     WBC, UA 05/29/2019 0-1*    RBC, UA 05/29/2019 0-1*    Bacteria, UA 05/29/2019 Occasional     Epithelial Cells 05/29/2019 Occasional     UROBILINOGEN UA 05/29/2019 Negative     WBC 05/31/2019 7 10     RBC 05/31/2019 4 16     Hemoglobin 05/31/2019 12 8     Hematocrit 05/31/2019 39 0     MCV 05/31/2019 94     MCH 05/31/2019 30 8     MCHC 05/31/2019 32 8     RDW 05/31/2019 13 5     MPV 05/31/2019 9 9     Platelets 66/53/7628 239     Sodium 05/31/2019 138     Potassium 05/31/2019 3 8     Chloride 05/31/2019 102     CO2 05/31/2019 29     ANION GAP 05/31/2019 7     BUN 05/31/2019 11     Creatinine 05/31/2019 0 59*    Glucose 05/31/2019 84     Glucose, Fasting 05/31/2019 84     Calcium 05/31/2019 9 5     AST 05/31/2019 38*    ALT 05/31/2019 40     Alkaline Phosphatase 05/31/2019 87     Total Protein 05/31/2019 6 6     Albumin 05/31/2019 3 5     Total Bilirubin 05/31/2019 0 60     eGFR 05/31/2019 99     Segmented % 05/31/2019 27*    Bands % 05/31/2019 1     Lymphocytes % 05/31/2019 53*    Monocytes % 05/31/2019 12*    Eosinophils, % 05/31/2019 3     Basophils % 05/31/2019 1     Atypical Lymphocytes % 05/31/2019 3*    Neutrophils Absolute 05/31/2019 1 99     Lymphocytes Absolute 05/31/2019 3 76     Monocytes Absolute 05/31/2019 0 85     Eosinophils Absolute 05/31/2019 0 21     Basophils Absolute 05/31/2019 0 07     Total Counted 05/31/2019 100     RBC Morphology 05/31/2019 Normal     Platelet Estimate 85/06/4535 Adequate     Ventricular Rate 06/03/2019 89     Atrial Rate 06/03/2019 89     [place] Interval 06/03/2019 150     QRSD Interval 06/03/2019 90     QT Interval 06/03/2019 384     QTC Interval 06/03/2019 467     P Axis 06/03/2019 -17     QRS [place] 06/03/2019 125     T Wave Axis 06/03/2019 -1        Progress Toward Goals:  Based on today's interview and review of prior notes, there has not been significant improvement  Heriberto Natter injection was refused by the Pt--therefore the team was willing to try a higher dose of the tablet form, as the Pt verbalized willingness to take it  The 12mg tab of Invega was started this morning and I will observe on this dose  Repeat CBC with diff and LFT    Assessment/Plan   Principal Problem:    [facility]  Active Problems:    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[facility] )    Acquired hypothyroidism    Gastroesophageal reflux disease without esophagitis      Recommended Treatment: Continue with pharmacotherapy, group therapy, milieu therapy and occupational therapy    The patient will be maintained on the following medications:    Current Facility-Administered Medications:  acetaminophen 650 mg Oral Q6H PRN Niels Le MD   albuterol 2 puff Inhalation Q4H PRN ABILIO Mccray   aluminum-magnesium hydroxide-simethicone 15 mL Oral Q4H PRN Priscilla Chew MD   ammonium lactate  Topical BID PRN ABILIO Mccray   benzonatate 100 mg Oral TID PRN Kimmie Woodall, MD   benztropine 1 mg Intramuscular Q8H PRN Priscilla Chew MD   benztropine 1 mg Oral Q8H PRN Priscilla Chew MD   enoxaparin 40 mg Subcutaneous Daily Kimmie Woodall, MD   fluticasone-vilanterol 1 puff Inhalation Daily Saranya Sanders MD   haloperidol 1 mg Oral Q6H PRN Cindi Ford MD   haloperidol lactate 2 mg Intramuscular Q6H PRN Cindi Ford MD   hydrOXYzine HCL 25 mg Oral Q6H PRN Priscilla Chew MD   levothyroxine 125 mcg Oral Early Morning Kimmie Woodall, MD   magnesium hydroxide 30 mL Oral Daily PRN Chasity Mcknight MD   paliperidone 12 mg Oral Daily ABILIO Mccray   Or       OLANZapine 10 mg Intramuscular Daily ABILIO Mccray   pantoprazole 40 mg Oral Early Morning Kimmie Woodall, MD   polyethylene glycol 17 g Oral Daily PRN ABILIO Mccray   QUEtiapine 50 mg Oral HS Ingris Joseph PA-C   risperiDONE 1 mg Oral Q8H PRN Priscilla Chew MD   theophylline 200 mg Oral Daily Kimmie Woodall, MD   traZODone 25 mg Oral HS PRN Kimmie Woodall, MD   tuberculin 5 Units Intradermal Once Cindi Ford MD       Risks, benefits and possible side effects of Medications:   Risks, benefits, and possible side effects of medications explained to patient and patient verbalizes understanding

## 2019-06-13 PROCEDURE — 99232 SBSQ HOSP IP/OBS MODERATE 35: CPT | Performed by: PHYSICIAN ASSISTANT

## 2019-06-13 RX ADMIN — QUETIAPINE FUMARATE 50 MG: 50 TABLET, FILM COATED ORAL at 21:56

## 2019-06-13 RX ADMIN — THEOPHYLLINE ANHYDROUS 200 MG: 200 CAPSULE, EXTENDED RELEASE ORAL at 08:24

## 2019-06-13 RX ADMIN — PALIPERIDONE 12 MG: 6 TABLET, EXTENDED RELEASE ORAL at 08:24

## 2019-06-13 RX ADMIN — PANTOPRAZOLE SODIUM 40 MG: 40 TABLET, DELAYED RELEASE ORAL at 05:38

## 2019-06-13 RX ADMIN — LEVOTHYROXINE SODIUM 125 MCG: 125 TABLET ORAL at 05:38

## 2019-06-13 RX ADMIN — FLUTICASONE FUROATE AND VILANTEROL TRIFENATATE 1 PUFF: 200; 25 POWDER RESPIRATORY (INHALATION) at 08:24

## 2019-06-13 NOTE — PLAN OF CARE
Problem: Alteration in Thoughts and Perception  Goal: Treatment Goal: Gain control of psychotic behaviors/thinking, reduce/eliminate presenting symptoms and demonstrate improved reality functioning upon discharge  Outcome: Progressing  Goal: Verbalize thoughts and feelings  Description  Interventions:  - Promote a nonjudgmental and trusting relationship with the patient through active listening and therapeutic communication  - Assess patient's level of functioning, behavior and potential for risk  - Engage patient in 1 on 1 interactions for a minimum of 15 minutes each session  - Encourage patient to express fears, feelings, frustrations, and discuss symptoms    - [place] patient to reality, help patient recognize reality-based thinking   - Administer medications as ordered and assess for potential side effects  - Provide the patient education related to the signs and symptoms of the illness and desired effects of prescribed medications  Outcome: Progressing  Goal: Refrain from acting on delusional thinking/internal stimuli  Description  Interventions:  - Monitor patient closely, per order   - Utilize least restrictive measures   - Set reasonable limits, give positive feedback for acceptable   - Administer medications as ordered and monitor of potential side effects  Outcome: Progressing  Goal: Agree to be compliant with medication regime, as prescribed and report medication side effects  Description  Interventions:  - Offer appropriate PRN medication and supervise ingestion; conduct aims, as needed   Outcome: Progressing  Goal: Attend and participate in unit activities, including therapeutic, recreational, and educational groups  Description  Interventions:  - Provide therapeutic and educational activities daily, encourage attendance and participation, and document same in the medical record   Outcome: Progressing  Goal: Recognize dysfunctional thoughts, communicate reality-based thoughts at the time of discharge  Description  Interventions:  - Provide medication and psycho-education to assist patient in compliance and developing insight into his/her illness   Outcome: Progressing  Goal: Complete daily ADLs, including personal hygiene independently, as able  Description  Interventions:  - Observe, teach, and assist patient with ADLS  - Monitor and promote a balance of rest/activity, with adequate nutrition and elimination   Outcome: Progressing     Problem: Ineffective Coping  Goal: Identifies ineffective coping skills  Outcome: Progressing  Goal: Identifies healthy coping skills  Outcome: Progressing  Goal: Demonstrates healthy coping skills  Outcome: Progressing  Goal: Participates in unit activities  Description  Interventions:  - Provide therapeutic environment   - Provide required programming   - Redirect inappropriate behaviors   Outcome: Progressing  Goal: Patient/Family participate in treatment and DC plans  Description  Interventions:  - Provide therapeutic environment  Outcome: Progressing  Goal: Patient/Family verbalizes awareness of resources  Outcome: Progressing  Goal: Understands least restrictive measures  Description  Interventions:  - Utilize least restrictive behavior  Outcome: Progressing  Goal: Free from restraint events  Description  - Utilize least restrictive measures   - Provide behavioral interventions   - Redirect inappropriate behaviors   Outcome: Progressing     Problem: Risk for Self Injury/Neglect  Goal: Treatment Goal: Remain safe during length of stay, learn and adopt new coping skills, and be free of self-injurious ideation, impulses and acts at the time of discharge  Outcome: Progressing  Goal: Verbalize thoughts and feelings  Description  Interventions:  - Assess and re-assess patient's lethality and potential for self-injury  - Engage patient in 1:1 interactions, daily, for a minimum of 15 minutes  - Encourage patient to express feelings, fears, frustrations, hopes  - Establish rapport/trust with patient   Outcome: Progressing  Goal: Refrain from harming self  Description  Interventions:  - Monitor patient closely, per order  - Develop a trusting relationship  - Supervise medication ingestion, monitor effects and side effects   Outcome: Progressing  Goal: Attend and participate in unit activities, including therapeutic, recreational, and educational groups  Description  Interventions:  - Provide therapeutic and educational activities daily, encourage attendance and participation, and document same in the medical record  - Obtain collateral information, encourage visitation and family involvement in care   Outcome: Progressing  Goal: Recognize maladaptive responses and adopt new coping mechanisms  Outcome: Progressing  Goal: Complete daily ADLs, including personal hygiene independently, as able  Description  Interventions:  - Observe, teach, and assist patient with ADLS  - Monitor and promote a balance of rest/activity, with adequate nutrition and elimination  Outcome: Progressing     Problem: Depression  Goal: Treatment Goal: Demonstrate behavioral control of depressive symptoms, verbalize feelings of improved mood/affect, and adopt new coping skills prior to discharge  Outcome: Progressing  Goal: Verbalize thoughts and feelings  Description  Interventions:  - Assess and re-assess patient's level of risk   - Engage patient in 1:1 interactions, daily, for a minimum of 15 minutes   - Encourage patient to express feelings, fears, frustrations, hopes   Outcome: Progressing  Goal: Refrain from harming self  Description  Interventions:  - Monitor patient closely, per order   - Supervise medication ingestion, monitor effects and side effects   Outcome: Progressing  Goal: Refrain from isolation  Description  Interventions:  - Develop a trusting relationship   - Encourage socialization   Outcome: Progressing  Goal: Refrain from self-neglect  Outcome: Progressing  Goal: Attend and participate in unit activities, including therapeutic, recreational, and educational groups  Description  Interventions:  - Provide therapeutic and educational activities daily, encourage attendance and participation, and document same in the medical record   Outcome: Progressing  Goal: Complete daily ADLs, including personal hygiene independently, as able  Description  Interventions:  - Observe, teach, and assist patient with ADLS  -  Monitor and promote a balance of rest/activity, with adequate nutrition and elimination   Outcome: Progressing     Problem: Anxiety  Goal: Anxiety is at manageable level  Description  Interventions:  - Assess and monitor patient's anxiety level  - Monitor for signs and symptoms of anxiety both physical and emotional (heart palpitations, chest pain, shortness of breath, headaches, nausea, feeling jumpy, restlessness, irritable, apprehensive)  - Collaborate with interdisciplinary team and initiate plan and interventions as ordered    - [place] patient to unit/surroundings  - Explain treatment plan  - Encourage participation in care  - Encourage verbalization of concerns/fears  - Identify coping mechanisms  - Assist in developing anxiety-reducing skills  - Administer/offer alternative therapies  - Limit or eliminate stimulants  Outcome: Progressing     Problem: DISCHARGE PLANNING - CARE MANAGEMENT  Goal: Discharge to post-acute care or home with appropriate resources  Description  INTERVENTIONS:  - Conduct assessment to determine patient/family and health care team treatment goals, and need for post-acute services based on payer coverage, community resources, and patient preferences, and barriers to discharge  - Address psychosocial, clinical, and financial barriers to discharge as identified in assessment in conjunction with the patient/family and health care team  - Arrange appropriate level of post-acute services according to patients   needs and preference and payer coverage in collaboration with the physician and health care team  - Communicate with and update the patient/family, physician, and health care team regarding progress on the discharge plan  - Arrange appropriate transportation to post-acute venues  Outcome: Progressing     Problem: Prexisting or High Potential for Compromised Skin Integrity  Goal: Skin integrity is maintained or improved  Description  INTERVENTIONS:  - Identify patients at risk for skin breakdown  - Assess and monitor skin integrity  - Assess and monitor nutrition and hydration status  - Monitor labs (i e  albumin)  - Assess for incontinence   - Turn and reposition patient  - Assist with mobility/ambulation  - Relieve pressure over bony prominences  - Avoid friction and shearing  - Provide appropriate hygiene as needed including keeping skin clean and dry  - Evaluate need for skin moisturizer/barrier cream  - Collaborate with interdisciplinary team (i e  Nutrition, Rehabilitation, etc )   - Patient/family teaching  Outcome: Progressing

## 2019-06-13 NOTE — PROGRESS NOTES
Monitored on Q 7 minute safety checks  Appears to be resting calmly, in bed with eyes closed  O2 at 2l via nasal cannula , PO 98 %   No behavior issues noted

## 2019-06-13 NOTE — CONSULTS
At risk foot care performed on 5/31/2019  Pt to follow up as out pt for continued at risk foot care

## 2019-06-13 NOTE — PROGRESS NOTES
Pt continues to be somatic although it is decreased this evening  Pt pleasant and cooperative with care  Pt OOB for meals and snack  Pt social among select peers this evening at dinner  No new concerns at this time  VSS

## 2019-06-13 NOTE — PROGRESS NOTES
Patient upset with Invega increased but compliant  Poor judgement    Irritable  Negative   Paranoid  Denies suicidal ideation /AH/VH  Was complaining  about breakfast what she ordered after few minutes patient ate the breakfast  Patient thinks she don't need any medication   O2 2L continued  Will continue to monitor

## 2019-06-13 NOTE — CASE MANAGEMENT
Patient continues to be somatic  Continues to ask for a leave to go home for her birthday and return to the facility, writer explained why it can not happen  CM will continue to follow and provide services as needed

## 2019-06-14 LAB
ALBUMIN SERPL BCP-MCNC: 3.3 G/DL (ref 3–5.2)
ALP SERPL-CCNC: 82 U/L (ref 43–122)
ALT SERPL W P-5'-P-CCNC: 36 U/L (ref 9–52)
AST SERPL W P-5'-P-CCNC: 23 U/L (ref 14–36)
BASOPHILS # BLD AUTO: 0 THOUSANDS/ΜL (ref 0–0.1)
BASOPHILS NFR BLD AUTO: 1 % (ref 0–1)
BILIRUB DIRECT SERPL-MCNC: 0.1 MG/DL
BILIRUB SERPL-MCNC: 0.3 MG/DL
EOSINOPHIL # BLD AUTO: 0.2 THOUSAND/ΜL (ref 0–0.4)
EOSINOPHIL NFR BLD AUTO: 3 % (ref 0–6)
ERYTHROCYTE [DISTWIDTH] IN BLOOD BY AUTOMATED COUNT: 13.6 %
HCT VFR BLD AUTO: 38.2 % (ref 36–46)
HGB BLD-MCNC: 12.3 G/DL (ref 12–16)
LYMPHOCYTES # BLD AUTO: 2.8 THOUSANDS/ΜL (ref 0.5–4)
LYMPHOCYTES NFR BLD AUTO: 42 % (ref 25–45)
MCH RBC QN AUTO: 30 PG (ref 26–34)
MCHC RBC AUTO-ENTMCNC: 32.1 G/DL (ref 31–36)
MCV RBC AUTO: 94 FL (ref 80–100)
MONOCYTES # BLD AUTO: 0.7 THOUSAND/ΜL (ref 0.2–0.9)
MONOCYTES NFR BLD AUTO: 11 % (ref 1–10)
NEUTROPHILS # BLD AUTO: 2.8 THOUSANDS/ΜL (ref 1.8–7.8)
NEUTS SEG NFR BLD AUTO: 43 % (ref 45–65)
PLATELET # BLD AUTO: 210 THOUSANDS/UL (ref 150–450)
PMV BLD AUTO: 9.6 FL (ref 8.9–12.7)
PROT SERPL-MCNC: 6.3 G/DL (ref 5.9–8.4)
RBC # BLD AUTO: 4.09 MILLION/UL (ref 4–5.2)
WBC # BLD AUTO: 6.5 THOUSAND/UL (ref 4.5–11)

## 2019-06-14 PROCEDURE — 97530 THERAPEUTIC ACTIVITIES: CPT

## 2019-06-14 PROCEDURE — 85025 COMPLETE CBC W/AUTO DIFF WBC: CPT | Performed by: PHYSICIAN ASSISTANT

## 2019-06-14 PROCEDURE — 97168 OT RE-EVAL EST PLAN CARE: CPT

## 2019-06-14 PROCEDURE — 99232 SBSQ HOSP IP/OBS MODERATE 35: CPT | Performed by: PHYSICIAN ASSISTANT

## 2019-06-14 PROCEDURE — 80076 HEPATIC FUNCTION PANEL: CPT | Performed by: PHYSICIAN ASSISTANT

## 2019-06-14 RX ADMIN — LEVOTHYROXINE SODIUM 125 MCG: 125 TABLET ORAL at 05:57

## 2019-06-14 RX ADMIN — THEOPHYLLINE ANHYDROUS 200 MG: 200 CAPSULE, EXTENDED RELEASE ORAL at 08:21

## 2019-06-14 RX ADMIN — QUETIAPINE FUMARATE 50 MG: 50 TABLET, FILM COATED ORAL at 22:00

## 2019-06-14 RX ADMIN — PALIPERIDONE 12 MG: 6 TABLET, EXTENDED RELEASE ORAL at 08:21

## 2019-06-14 RX ADMIN — FLUTICASONE FUROATE AND VILANTEROL TRIFENATATE 1 PUFF: 200; 25 POWDER RESPIRATORY (INHALATION) at 08:21

## 2019-06-14 RX ADMIN — PANTOPRAZOLE SODIUM 40 MG: 40 TABLET, DELAYED RELEASE ORAL at 05:57

## 2019-06-14 NOTE — NURSING NOTE
Patient visible during evening hours, social with peers  Still suspicious with medicine  However, patient was compliant with med  No further complaints   Will continue to monitor per protocol

## 2019-06-14 NOTE — PROGRESS NOTES
Progress Note - Behavioral Health     Tita Dejesus 66 y o  female MRN: [id number]  Unit/Bed#: [facility] Mail 221-00 Encounter: [id number]    Tita Dejesus was seen for continuing care and reviewed with treatment team   Pt was in bed on my arrival, on continuous O2 and she was hyper-verbal, stated "Well yesterday I was happy  I got a call from my sister "   She c/o "I go to bed before 11PM and I'm still not getting up "  She feels tired from her medication  She states she is not given the O2 tank so that she can walk around with it or shower, and has not been offered (Nursing refutes this and state that Pt refuses the tank especially during showering)  Pt also c/o blurry vision and did not at first state it was related to a medicine, then later stated it was since she started the [place]  However, she did not c/o this yesterday to this writer  She states she is depressed for "The same reasons "  She is anxious regarding "I opened a can of worms about the bathing "  She feels her sense of mistrust is the same  Pt presently denies SI, HI or hallucinations  Floor team relayed she is visible in the milieu moreso in the evening but does not attend groups  She has otherwise been calm overall and medication compliant       Sleep:Good  Appetite: Good   Medication side effects: As per HPI    ROS: As per HPI    Labs/tests: Reviewed    Mental Status Evaluation:  Appearance:  Dressed, Fair hygiene, Good eye contact   Behavior:  Calm, cooperative, pleasant   Speech:  Clear, normal rate and volume, Pressured   Mood:  Anxious, Depressed   Affect:  Mildly constricted   Thought Process:  Circumstantial, somewhat tangential at times   Associations: Some tangential associations    Thought Content:  Paranoid delusions   Perceptual Disturbances: Pt is having paranoia but does not appear to be responding to internal stimuli   Risk Potential: Pt presently denies SI or HI    Sensorium:  Self, birthday, Place, Day of the week, Month, Year Memory:  short term memory grossly intact   Consciousness:  alert, awake   Attention: Good   Insight:  Limited   Judgment: Limited   Gait/Station: Unobserved--Pt in bed   Motor Activity: No abnormal movements     Vitals:    06/13/19 0658 06/13/19 1523 06/13/19 2111 06/14/19 0650   BP: 110/62 103/63 112/60 128/53   BP Location: Left arm Right arm Right arm Left arm   Pulse: 85 94 90 98   Resp: 18 18 18 18   Temp: 97 6 °F (36 4 °C) 98 2 °F (36 8 °C) 97 8 °F (36 6 °C) (!) 97 3 °F (36 3 °C)   TempSrc: Temporal Tympanic Temporal Temporal   SpO2: 98% 97% 97% 99%   Weight:       Height:           Admission on 05/05/2019   Component Date Value    POC Glucose 05/10/2019 156*    Clarity, UA 05/29/2019 Clear     Color, UA 05/29/2019 Straw     Specific [place], UA 05/29/2019 1 015     pH, UA 05/29/2019 8 0     Glucose, UA 05/29/2019 Negative     Ketones, UA 05/29/2019 Negative     Blood, UA 05/29/2019 Negative     Protein, UA 05/29/2019 Negative     Nitrite, UA 05/29/2019 Negative     Bilirubin, UA 05/29/2019 Negative     Leukocytes, UA 05/29/2019 Negative     WBC, UA 05/29/2019 0-1*    RBC, UA 05/29/2019 0-1*    Bacteria, UA 05/29/2019 Occasional     Epithelial Cells 05/29/2019 Occasional     UROBILINOGEN UA 05/29/2019 Negative     WBC 05/31/2019 7 10     RBC 05/31/2019 4 16     Hemoglobin 05/31/2019 12 8     Hematocrit 05/31/2019 39 0     MCV 05/31/2019 94     MCH 05/31/2019 30 8     MCHC 05/31/2019 32 8     RDW 05/31/2019 13 5     MPV 05/31/2019 9 9     Platelets 08/66/3859 239     Sodium 05/31/2019 138     Potassium 05/31/2019 3 8     Chloride 05/31/2019 102     CO2 05/31/2019 29     ANION GAP 05/31/2019 7     BUN 05/31/2019 11     Creatinine 05/31/2019 0 59*    Glucose 05/31/2019 84     Glucose, Fasting 05/31/2019 84     Calcium 05/31/2019 9 5     AST 05/31/2019 38*    ALT 05/31/2019 40     Alkaline Phosphatase 05/31/2019 87     Total Protein 05/31/2019 6 6     Albumin 05/31/2019 3  5     Total Bilirubin 05/31/2019 0 60     eGFR 05/31/2019 99     Segmented % 05/31/2019 27*    Bands % 05/31/2019 1     Lymphocytes % 05/31/2019 53*    Monocytes % 05/31/2019 12*    Eosinophils, % 05/31/2019 3     Basophils % 05/31/2019 1     Atypical Lymphocytes % 05/31/2019 3*    Neutrophils Absolute 05/31/2019 1 99     Lymphocytes Absolute 05/31/2019 3 76     Monocytes Absolute 05/31/2019 0 85     Eosinophils Absolute 05/31/2019 0 21     Basophils Absolute 05/31/2019 0 07     Total Counted 05/31/2019 100     RBC Morphology 05/31/2019 Normal     Platelet Estimate 32/66/5090 Adequate     Ventricular Rate 06/03/2019 89     Atrial Rate 06/03/2019 89     MD Interval 06/03/2019 150     QRSD Interval 06/03/2019 90     QT Interval 06/03/2019 384     QTC Interval 06/03/2019 467     P Axis 06/03/2019 -17     QRS [place] 06/03/2019 125     T Wave Axis 06/03/2019 -1     Total Bilirubin 06/14/2019 0 30     Bilirubin, Direct 06/14/2019 0 10     Alkaline Phosphatase 06/14/2019 82     AST 06/14/2019 23     ALT 06/14/2019 36     Total Protein 06/14/2019 6 3     Albumin 06/14/2019 3 3     WBC 06/14/2019 6 50     RBC 06/14/2019 4 09     Hemoglobin 06/14/2019 12 3     Hematocrit 06/14/2019 38 2     MCV 06/14/2019 94     MCH 06/14/2019 30 0     MCHC 06/14/2019 32 1     RDW 06/14/2019 13 6     MPV 06/14/2019 9 6     Platelets 17/05/9960 210     Neutrophils Relative 06/14/2019 43*    Lymphocytes Relative 06/14/2019 42     Monocytes Relative 06/14/2019 11*    Eosinophils Relative 06/14/2019 3     Basophils Relative 06/14/2019 1     Neutrophils Absolute 06/14/2019 2 80     Lymphocytes Absolute 06/14/2019 2 80     Monocytes Absolute 06/14/2019 0 70     Eosinophils Absolute 06/14/2019 0 20     Basophils Absolute 06/14/2019 0 00        Progress Toward Goals:  No change  Continue to observe on present regimen    Nolberto Banana may be the cause of some of the blurriness of vision, but I discussed with Pt that team will monitor for dissipation of this SE  Maintaining medications over objection  Assessment/Plan   Principal Problem:    Schizoaffective disorder, bipolar type ([facility] )  Active Problems:    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[facility] )    Acquired hypothyroidism    Gastroesophageal reflux disease without esophagitis      Recommended Treatment: Continue with pharmacotherapy, group therapy, milieu therapy and occupational therapy    The patient will be maintained on the following medications:    Current Facility-Administered Medications:  acetaminophen 650 mg Oral Q6H PRN Niels Le MD   albuterol 2 puff Inhalation Q4H PRN Myrle Dense, CRNP   aluminum-magnesium hydroxide-simethicone 15 mL Oral Q4H PRN Sae Wilson MD   ammonium lactate  Topical BID PRN Myrle Dense, CRNP   benzonatate 100 mg Oral TID PRN Niels Le MD   benztropine 1 mg Intramuscular Q8H PRN Sae Wilson MD   benztropine 1 mg Oral Q8H PRN Sae Wilson MD   enoxaparin 40 mg Subcutaneous Daily Niels Le MD   fluticasone-vilanterol 1 puff Inhalation Daily Vani Zee MD   haloperidol 1 mg Oral Q6H PRN Rosemarie Sutherland MD   haloperidol lactate 2 mg Intramuscular Q6H PRN Rosemarie Sutherland MD   hydrOXYzine HCL 25 mg Oral Q6H PRN Sae Wilson MD   levothyroxine 125 mcg Oral Early Morning Niels Le MD   magnesium hydroxide 30 mL Oral Daily PRN [place] Mortimer, MD   paliperidone 12 mg Oral Daily Myrle Dense, CRNP   Or       OLANZapine 10 mg Intramuscular Daily Myrle Dense, CRNP   pantoprazole 40 mg Oral Early Morning Niels Le MD   polyethylene glycol 17 g Oral Daily PRN Myrle Dense, CRNP   QUEtiapine 50 mg Oral HS Nicky Martinez PA-C   risperiDONE 1 mg Oral Q8H PRN Sae Wilson MD   theophylline 200 mg Oral Daily Niels Le MD   traZODone 25 mg Oral HS PRN Niels Le MD   tuberculin 5 Units Intradermal Once Rosemarie Sutherland MD       Risks, benefits and possible side effects of Medications:   Risks, benefits, and possible side effects of medications explained to patient and patient verbalizes understanding

## 2019-06-14 NOTE — CASE MANAGEMENT
Patient continues to remain the same, no changes noted  Remains waiting bed on [facility]  CM will continue to follow and provide services as needed

## 2019-06-14 NOTE — PLAN OF CARE
Problem: OCCUPATIONAL THERAPY ADULT  Goal: Performs self-care activities at highest level of function for planned discharge setting  See evaluation for individualized goals  Description  Treatment Interventions: ADL retraining, Endurance training, Continued evaluation, Activityengagement(coping skills, life management, reality focus)          See flowsheet documentation for full assessment, interventions and recommendations  Outcome: Progressing  Note:   Limitation: Decreased ADL status, Decreased high-level ADLs, Mood limitation(guarded, limited coping and life management skills)  Prognosis: Fair  Assessment: Pt is a 66 y o  female seen for OT reevaluation s/p admit to [facility] on 5/5/2019 w/ Schizoaffective disorder, bipolar type ([facility] )  Comorbidities affecting pt's functional performance at time of assessment include: limited vision and COPD (with O2 via [place]), acquired hypothyriodism, GERD  Personal factors affecting pt at time of IE include:behavioral pattern, limited insight into deficits, compliance, decreased initiation and engagement  and refusal to return to [place], necessity of O2  Prior to admission, pt was residing in [place]  Upon evaluation: Pt requires treatment the following deficits impacting occupational performance: decreased tolerance, impaired initiation, impaired problem solving, impaired interpersonal skills, decreased coping skills and decreased life management skills, impaired reality focus  Pt to benefit from continued skilled OT tx while in the hospital to address deficits as defined above and maximize level of functional independence w ADL's and functional mobility  Occupational Performance areas to address include: socialization, health maintenance, social participation and coping skills instruction, life management instruction, reality focus   From OT standpoint, recommendation at time of d/c would be for continued treatment and eventual supervised placement with supports when stable and consistently cooperative with caregivers  Marilu Mondragon

## 2019-06-14 NOTE — OCCUPATIONAL THERAPY NOTE
Occupational Therapy Evaluation/ Reevaluation      Rajat Bob    6/14/2019    Patient Active Problem List   Diagnosis    Sepsis ([facility] )    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[facility] )    Tobacco use disorder, continuous    Bipolar disorder ([facility] )    Left hip pain    Lactic acidosis    Hypokalemia    Hypomagnesemia    Compression fracture of L4 lumbar vertebra    Thoracic compression fracture (HCC)    Ventral hernia    Parapneumonic effusion    Acute on chronic respiratory failure with hypoxia (HCC)    Chronic respiratory failure (HCC)    Hypophosphatemia    Elevated MCV    Compression deformity of vertebra    Schizoaffective disorder, bipolar type ([facility] )    Acquired hypothyroidism    Gastroesophageal reflux disease without esophagitis    Abnormal CT of the chest    Excessive cerumen in left ear canal    Lipoma of right upper extremity    Polydipsia    Localized swelling of both lower legs       Past Medical History:   Diagnosis Date    Acid reflux     Anxiety     Asthma     Bipolar 1 disorder (HCC)     Chronic pain disorder     Chronic respiratory failure (HCC)     Compression fracture of fourth lumbar vertebra ([facility] )     COPD (chronic obstructive pulmonary disease) (HCC)     Depression     GERD (gastroesophageal reflux disease)     History of home oxygen therapy     Hypothyroidism     Lipoma of upper extremity     Psychiatric illness     [facility]oaffective disorder ([facility] )     [facility]     Nicotine    Thoracic compression fracture ([facility]     Ventral hernia        No past surgical history on file  Subjective:  RE: reason for hospitalization: "To get all the help I can get out of [facility] psychological unit" "I think that its a matter of medication and finding me another residence "       Prior Level of Function:  Prior to this hospitalization, Michael Hernandez had been residing in an long term ([place])   She stated that while there, she did need occasional assistance for personal care, she did not always receive this  She is ambulatory  She stated that she did manage her own money, [place] was providing assistance with meals (she stated that she did not like what was provided), housekeeping, laundry  She is dependent on transportation needs  She was assisted with medications by staff at [facility]  Current Level of Function:  Tatiana Ulloa does work with this Juan Carlos Pettibone  She has often been pleasant in her interactions, this has been an improvement than when I first met with her and she was very limited in her conversation and investment  She continues to be at times resistive to select aspects of her care  Often, when interacting with this writer, she is topic focussed, but at times she has interjected odd comments and delusions  She is ambulatory, she does require O2 and this has been limiting her ambulation  O X4  She denies any falls  She stated that at times she continues to receive assistance for personal care (I e , supervision/ min assistance for bathing), she stated that this is largely due to her requiring O2  She is independent with feeding herself in this environment  Per daily care documentation, she is noted to be independent in hygiene needs     Per reported supports, she stated, "I'm indecisive about my sister "      Work Task Skills:   Task Investment assistance needed to initiate/attend/complete task               Problem Solving she was able to do some problem solving on 1-2 step lace task, but this was inconsistent, and she did state that she was having difficulty with seeing the task   Concentration No difficulty               Follows Direction Tatiana Ulloa was able to carry out multistep written directions, 1-2 step whip stitch lace task with inconsistent error correction   Frustration Tolerance Requires assistance to continue work, when frustrated when she is accepting of this assistance    Social Skills:   Dyadic Interaction (eye contact, makes needs known, goal directed conversation)    Eye contact: Good    Quality of Response: Clear and Concise during this interview    Goal Directed: Yes    False Beliefs: She was goal directed, reality focussed, but she sometimes has in recent past verbalized false beliefs    Pain:   She stated that she has some pain in her gut (rates this 3 or 4), but she is waiting for this to subside and does not want anything for this at this time  Assessment performed:      Pt is a 66 y o  female seen for OT reevaluation s/p admit to [facility] on 5/5/2019 w/ Schizoaffective disorder, bipolar type ([facility] )  Comorbidities affecting pt's functional performance at time of assessment include: limited vision and COPD (with O2 via [place]), acquired hypothyriodism, GERD  Personal factors affecting pt at time of IE include:behavioral pattern, limited insight into deficits, compliance, decreased initiation and engagement  and refusal to return to [place], necessity of O2  Prior to admission[facility]  Upon evaluation: Pt requires treatment the following deficits impacting occupational performance: decreased tolerance, impaired initiation, impaired problem solving, impaired interpersonal skills, decreased coping skills and decreased life management skills, impaired reality focus  Pt to benefit from continued skilled OT tx while in the hospital to address deficits as defined above and maximize level of functional independence w ADL's and functional mobility  Occupational Performance areas to address include: socialization, health maintenance, social participation and coping skills instruction, life management instruction, reality focus  From OT standpoint, recommendation at time of d/c would be for continued treatment and eventual supervised placement with supports when stable and consistently cooperative with caregivers           Patient Goal:  She was not able to share a specific goal, but she did state that she still sometimes feels anxious and depressed      Discharge Recommendation: Continued extended treatment, eventual supervised living with supports, assistance as needed  Frequency:  2-3X/ week  Goals:  1  Identify and explore strategies to promote improved functioning and wellness  2  Identify 3 positive qualities of self  3 Consistently interact with others in a reality focussed, give and take manner without interference from inner stimuli/ false beliefs 100% of the time  4  Consistently cooperate with caregiver assistance without interference from resistive behaviors 100% of the time  5  Consistently utilize positive coping strategies to deal with anxiety without becoming unduly anxious and consequently unable to carry out personal daily life tasks        Goal Expiration:   30 days      Group Recommendations:   Coping skills  Simple exercise  Relaxation  Healthy Living      Jenifer Naranjo, OT

## 2019-06-14 NOTE — OCCUPATIONAL THERAPY NOTE
Occupational Therapy Activity Treatment Note      Rajat Rojas    6/14/2019    Patient Active Problem List   Diagnosis    Sepsis [facility] )    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[facility] )    Tobacco use disorder, continuous    Bipolar disorder ([facility] )    Left hip pain    Lactic acidosis    Hypokalemia    Hypomagnesemia    Compression fracture of L4 lumbar vertebra    Thoracic compression fracture (HCC)    Ventral hernia    Parapneumonic effusion    Acute on chronic respiratory failure with hypoxia (HCC)    Chronic respiratory failure (HCC)    Hypophosphatemia    Elevated MCV    Compression deformity of vertebra    Schizoaffective disorder, bipolar type ([facility] )    Acquired hypothyroidism    Gastroesophageal reflux disease without esophagitis    Abnormal CT of the chest    Excessive cerumen in left ear canal    Lipoma of right upper extremity    Polydipsia    Localized swelling of both lower legs       Past Medical History:   Diagnosis Date    Acid reflux     Anxiety     Asthma     Bipolar 1 disorder (HCC)     Chronic pain disorder     Chronic respiratory failure (HCC)     Compression fracture of fourth lumbar vertebra (HCC)     COPD (chronic obstructive pulmonary disease) (HCC)     Depression     GERD (gastroesophageal reflux disease)     History of home oxygen therapy     Hypothyroidism     Lipoma of upper extremity     Psychiatric illness     Schizoaffective disorder ([facility] )     Substance abuse ([facility] )     Nicotine    Thoracic compression fracture ([facility] )     Ventral hernia        No past surgical history on file      06/14/19 1576   Assessment   Assessment Michael Hernandez was sitting in her room on her bed when approached for OT activity program  She was pleasant on approach, in good spirits  She stated that she had gone to the earlier [employer], she did enjoy this  She did state that she was interested in positive focus activities, she engaged in positive memory discussion   She talked about her favorite school trips ([employer] , [employer]), how she lived with her janet until she was 7years old  Her conversation up until that point was relevant, but when talking about how her janet had , she attributed this to her bad step mother, and she insinuated that this person had given her janet something over time to contribute to the untimely passing  She also talked about having an inheritance that has been hidden from her, that she did not know how to go about to retrieve this  She was pleasant throughout her interactions  She did attend to other topic question cards, she stated that she did enjoy this activity, she did like many different types of activities  She also talked about her greatest achievement, her son, and she talked about how she wishes he would call her and visit her  She was pleasant and actively engaged in her treatment  Progress has been slow but consistent towards her goals on this occasion  Continue to promote positive focus, reality based interactions with others in the OT program setting  Plan   Treatment Interventions ADL retraining; Endurance training;Continued evaluation; Activityengagement  (coping skills, life management, reality focus)   Goal Expiration Date 19   Treatment Day 1   OT Frequency 2-3x/wk   ROSA MARIA Jackson

## 2019-06-14 NOTE — PROGRESS NOTES
Patient visible  Social   Less somatic   Preoccupied with BREO inhaler needed to reorder   Med compliant with no resistance  Meal compliant  Denies suicidal ideation /VH/AH  Will continue to monitor

## 2019-06-14 NOTE — PLAN OF CARE
Problem: OCCUPATIONAL THERAPY ADULT  Goal: Performs self-care activities at highest level of function for planned discharge setting  See evaluation for individualized goals  Description  Treatment Interventions: ADL retraining, Endurance training, Continued evaluation, Activityengagement(coping skills, life management, reality focus)          See flowsheet documentation for full assessment, interventions and recommendations  2019 1633 by Zelda Vázquez OT  Outcome: Progressing  Note:   Limitation: Decreased ADL status, Decreased high-level ADLs, Mood limitation(guarded, limited coping and life management skills)  Prognosis: Fair  Assessment: Aparna Noriega was sitting in her room on her bed when approached for OT activity program  She was pleasant on approach, in good spirits  She stated that she had gone to the earlier [employer], she did enjoy this  She did state that she was interested in positive focus activities, she engaged in positive memory discussion  She talked about her favorite school trips ([employer] , [employer]), how she lived with her janet until she was 7years old  Her conversation up until that point was relevant, but when talking about how her janet had , she attributed this to her bad step mother, and she insinuated that this person had given her janet something over time to contribute to the untimely passing  She also talked about having an inheritance that has been hidden from her, that she did not know how to go about to retrieve this  She was pleasant throughout her interactions  She did attend to other topic question cards, she stated that she did enjoy this activity, she did like many different types of activities  She also talked about her greatest achievement, her son, and she talked about how she wishes he would call her and visit her  She was pleasant and actively engaged in her treatment  Progress has been slow but consistent towards her goals on this occasion   Continue to promote positive focus, reality based interactions with others in the OT program setting

## 2019-06-15 PROCEDURE — 99231 SBSQ HOSP IP/OBS SF/LOW 25: CPT | Performed by: PHYSICIAN ASSISTANT

## 2019-06-15 RX ADMIN — PANTOPRAZOLE SODIUM 40 MG: 40 TABLET, DELAYED RELEASE ORAL at 05:19

## 2019-06-15 RX ADMIN — QUETIAPINE FUMARATE 50 MG: 50 TABLET, FILM COATED ORAL at 21:37

## 2019-06-15 RX ADMIN — LEVOTHYROXINE SODIUM 125 MCG: 125 TABLET ORAL at 05:19

## 2019-06-15 RX ADMIN — PALIPERIDONE 12 MG: 6 TABLET, EXTENDED RELEASE ORAL at 09:01

## 2019-06-15 RX ADMIN — FLUTICASONE FUROATE AND VILANTEROL TRIFENATATE 1 PUFF: 200; 25 POWDER RESPIRATORY (INHALATION) at 07:59

## 2019-06-15 RX ADMIN — THEOPHYLLINE ANHYDROUS 200 MG: 200 CAPSULE, EXTENDED RELEASE ORAL at 09:01

## 2019-06-15 NOTE — PROGRESS NOTES
Patient is alert and oriented x4  Is pleasant and cooperative with approach  Is isolative to self  Has been calling out this morning for several things  Patient provided shower with MHT assisting  Using O2 tank while showering or making phone cals  Patient denies SI/HI or hallucinations   Patient has been encouraged for self care,walking and ADLs  Will keep monitoring for safety and support

## 2019-06-15 NOTE — PROGRESS NOTES
Progress Note - Behavioral Health   Avni Garnett 66 y o  female MRN: [id number]  Unit/Bed#: Odalys Kuhn 176-29 Encounter: [id number]    Assessment  Active Problems:Schizoaffective disorder, bipolar type [facility])  Patient Active Problem List   Diagnosis    Sepsis ([facility] [address] )    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    Tobacco use disorder, continuous    Bipolar disorder ([facility] )    Left hip pain    Lactic acidosis    Hypokalemia    Hypomagnesemia    Compression fracture of L4 lumbar vertebra    Thoracic compression fracture (HCC)    Ventral hernia    Parapneumonic effusion    Acute on chronic respiratory failure with hypoxia (HCC)    Chronic respiratory failure (HCC)    Hypophosphatemia    Elevated MCV    Compression deformity of vertebra    Schizoaffective disorder, bipolar type (HCC)    Acquired hypothyroidism    Gastroesophageal reflux disease without esophagitis    Abnormal CT of the chest    Excessive cerumen in left ear canal    Lipoma of right upper extremity    Polydipsia    Localized swelling of both lower legs       Vitals:    06/15/19 1600   BP: 95/67   Pulse:    Resp:    Temp:    SpO2:        Behavior over the last 24 hours:  unchanged  Sleep: normal  Appetite: normal  Medication side effects: No  ROS: Patient is various nonspecific somatic complaints    Mental Status Evaluation:  Appearance:  casually dressed   Behavior:  Cooperative with interview   Speech:  normal pitch and normal volume   Mood:  anxious   Affect:  mood-congruent   Thought Process:  circumstantial   Thought Content:  Paranoid/somatic delusions   Perceptual Disturbances: None   Potential for Aggression:    Suicidal/Homicidal Ideation: Not when seen    No SI or HI reported   Sensorium:  person, place and time/date   Cognition:  grossly intact   Consciousness:  alert and awake    Attention: attention span and concentration were age appropriate   Insight:  limited   Judgment: limited   Gait/Station: normal gait/station and normal balance   Motor Activity: no abnormal movements     Progress Toward Goals and Additional Assessment:  Patient still has paranoid delusions at times, but is redirectable during interview  Patient also has several nonspecific somatic complaints, and states that she feels exhausted" and roughed up; however, she is not focused on any particular complaint for very long  Staff reports that patient is largely unchanged the past 24 hours  Patient reports that her main concern at this time is placement  Recommended Treatment:   1) Continue with group therapy, milieu therapy and individual therapy  2) Continue current medications and treatment    Risks, benefits and possible side effects of Medications:   Risks, benefits, and possible side effects of medications explained to patient and patient verbalizes understanding        Medications:   all current active meds have been reviewed and current meds:   Current Facility-Administered Medications   Medication Dose Route Frequency    acetaminophen (TYLENOL) tablet 650 mg  650 mg Oral Q6H PRN    albuterol (PROVENTIL HFA,VENTOLIN HFA) inhaler 2 puff  2 puff Inhalation Q4H PRN    aluminum-magnesium hydroxide-simethicone (MYLANTA) 200-200-20 mg/5 mL oral suspension 15 mL  15 mL Oral Q4H PRN    ammonium lactate (LAC-HYDRIN) 12 % lotion   Topical BID PRN    benzonatate (TESSALON PERLES) capsule 100 mg  100 mg Oral TID PRN    benztropine (COGENTIN) injection 1 mg  1 mg Intramuscular Q8H PRN    benztropine (COGENTIN) tablet 1 mg  1 mg Oral Q8H PRN    enoxaparin (LOVENOX) subcutaneous injection 40 mg  40 mg Subcutaneous Daily    fluticasone-vilanterol (BREO ELLIPTA) 200-25 MCG/INH inhaler 1 puff  1 puff Inhalation Daily    haloperidol (HALDOL) oral concentrated solution 1 mg  1 mg Oral Q6H PRN    haloperidol lactate (HALDOL) injection 2 mg  2 mg Intramuscular Q6H PRN    hydrOXYzine HCL (ATARAX) tablet 25 mg  25 mg Oral Q6H PRN    levothyroxine tablet 125 mcg  125 mcg Oral Early Morning    magnesium hydroxide (MILK OF MAGNESIA) 400 mg/5 mL oral suspension 30 mL  30 mL Oral Daily PRN    paliperidone (INVEGA) 24 hr tablet 12 mg  12 mg Oral Daily    Or    OLANZapine (ZyPREXA) IM injection 10 mg  10 mg Intramuscular Daily    pantoprazole (PROTONIX) EC tablet 40 mg  40 mg Oral Early Morning    polyethylene glycol (MIRALAX) packet 17 g  17 g Oral Daily PRN    QUEtiapine (SEROquel) tablet 50 mg  50 mg Oral HS    risperiDONE (RisperDAL M-TABS) dispersible tablet 1 mg  1 mg Oral Q8H PRN    theophylline (JEF-24) 24 hr capsule 200 mg  200 mg Oral Daily    traZODone (DESYREL) tablet 25 mg  25 mg Oral HS PRN    tuberculin injection 5 Units  5 Units Intradermal Once         Labs:   Admission on 05/05/2019   Component Date Value    POC Glucose 05/10/2019 156*    Clarity, UA 05/29/2019 Clear     Color, UA 05/29/2019 Straw     Specific [place], UA 05/29/2019 1 015     pH, UA 05/29/2019 8 0     Glucose, UA 05/29/2019 Negative     Ketones, UA 05/29/2019 Negative     Blood, UA 05/29/2019 Negative     Protein, UA 05/29/2019 Negative     Nitrite, UA 05/29/2019 Negative     Bilirubin, UA 05/29/2019 Negative     Leukocytes, UA 05/29/2019 Negative     WBC, UA 05/29/2019 0-1*    RBC, UA 05/29/2019 0-1*    Bacteria, UA 05/29/2019 Occasional     Epithelial Cells 05/29/2019 Occasional     UROBILINOGEN UA 05/29/2019 Negative     WBC 05/31/2019 7 10     RBC 05/31/2019 4 16     Hemoglobin 05/31/2019 12 8     Hematocrit 05/31/2019 39 0     MCV 05/31/2019 94     MCH 05/31/2019 30 8     MCHC 05/31/2019 32 8     RDW 05/31/2019 13 5     MPV 05/31/2019 9 9     Platelets 70/45/3463 239     Sodium 05/31/2019 138     Potassium 05/31/2019 3 8     Chloride 05/31/2019 102     CO2 05/31/2019 29     ANION GAP 05/31/2019 7     BUN 05/31/2019 11     Creatinine 05/31/2019 0 59*    Glucose 05/31/2019 84     Glucose, Fasting 05/31/2019 84     Calcium 05/31/2019 9 5     AST 05/31/2019 38*    ALT 05/31/2019 40     Alkaline Phosphatase 05/31/2019 87     Total Protein 05/31/2019 6 6     Albumin 05/31/2019 3 5     Total Bilirubin 05/31/2019 0 60     eGFR 05/31/2019 99     Segmented % 05/31/2019 27*    Bands % 05/31/2019 1     Lymphocytes % 05/31/2019 53*    Monocytes % 05/31/2019 12*    Eosinophils, % 05/31/2019 3     Basophils % 05/31/2019 1     Atypical Lymphocytes % 05/31/2019 3*    Neutrophils Absolute 05/31/2019 1 99     Lymphocytes Absolute 05/31/2019 3 76     Monocytes Absolute 05/31/2019 0 85     Eosinophils Absolute 05/31/2019 0 21     Basophils Absolute 05/31/2019 0 07     Total Counted 05/31/2019 100     RBC Morphology 05/31/2019 Normal     Platelet Estimate 97/39/5112 Adequate     Ventricular Rate 06/03/2019 89     Atrial Rate 06/03/2019 89     UT Interval 06/03/2019 150     QRSD Interval 06/03/2019 90     QT Interval 06/03/2019 384     QTC Interval 06/03/2019 467     P Axis 06/03/2019 -17     QRS [place] 06/03/2019 125     T Wave Axis 06/03/2019 -1     Total Bilirubin 06/14/2019 0 30     Bilirubin, Direct 06/14/2019 0 10     Alkaline Phosphatase 06/14/2019 82     AST 06/14/2019 23     ALT 06/14/2019 36     Total Protein 06/14/2019 6 3     Albumin 06/14/2019 3 3     WBC 06/14/2019 6 50     RBC 06/14/2019 4 09     Hemoglobin 06/14/2019 12 3     Hematocrit 06/14/2019 38 2     MCV 06/14/2019 94     MCH 06/14/2019 30 0     MCHC 06/14/2019 32 1     RDW 06/14/2019 13 6     MPV 06/14/2019 9 6     Platelets 20/95/8845 210     Neutrophils Relative 06/14/2019 43*    Lymphocytes Relative 06/14/2019 42     Monocytes Relative 06/14/2019 11*    Eosinophils Relative 06/14/2019 3     Basophils Relative 06/14/2019 1     Neutrophils Absolute 06/14/2019 2 80     Lymphocytes Absolute 06/14/2019 2 80     Monocytes Absolute 06/14/2019 0 70     Eosinophils Absolute 06/14/2019 0 20     Basophils Absolute 06/14/2019 0 00        Is billing to be determined based on time spent on case? No       Meryle Moynahan, PA-C

## 2019-06-15 NOTE — PROGRESS NOTES
Patient is isolative; keeps to herself in bed but does get OOB for meals and snacks  Appears cooperative and less demanding than previously   O2 at 2L/min Less somatically preoccupied but still suspicious and paranoid (people tampering with her food)  She denies SI

## 2019-06-16 LAB — GLUCOSE SERPL-MCNC: 86 MG/DL (ref 65–140)

## 2019-06-16 PROCEDURE — 82948 REAGENT STRIP/BLOOD GLUCOSE: CPT

## 2019-06-16 PROCEDURE — 99232 SBSQ HOSP IP/OBS MODERATE 35: CPT | Performed by: PHYSICIAN ASSISTANT

## 2019-06-16 RX ADMIN — THEOPHYLLINE ANHYDROUS 200 MG: 200 CAPSULE, EXTENDED RELEASE ORAL at 08:04

## 2019-06-16 RX ADMIN — PANTOPRAZOLE SODIUM 40 MG: 40 TABLET, DELAYED RELEASE ORAL at 06:53

## 2019-06-16 RX ADMIN — PALIPERIDONE 12 MG: 6 TABLET, EXTENDED RELEASE ORAL at 08:04

## 2019-06-16 RX ADMIN — QUETIAPINE FUMARATE 50 MG: 50 TABLET, FILM COATED ORAL at 21:21

## 2019-06-16 RX ADMIN — LEVOTHYROXINE SODIUM 125 MCG: 125 TABLET ORAL at 06:53

## 2019-06-16 RX ADMIN — FLUTICASONE FUROATE AND VILANTEROL TRIFENATATE 1 PUFF: 200; 25 POWDER RESPIRATORY (INHALATION) at 08:00

## 2019-06-16 NOTE — PROGRESS NOTES
Progress Note - Behavioral Health   Tita Dejesus 66 y o  female MRN: [id number]  Unit/Bed#: Jose Daniel Mail 184-56 Encounter: [id number]    Assessment/Plan   Principal Problem:    Schizoaffective disorder, bipolar type ([facility] )  Active Problems:    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[facility] )    Acquired hypothyroidism    Gastroesophageal reflux disease without esophagitis      Subjective:  Patient today had circumstantial thought process and required redirection  Was often talking about somatic complaints like low blood pressure this morning  Was overall in good spirits and had bright affect  Was cooperative with answering all my questions  States her depression is low and denied suicidal thoughts  States she does feel helpless at times remaining in hospital   Has anxiety over her breathing issues  States she is getting antsy has no place to go  According to nursing patient has paranoid beliefs  Is allegedly awaiting for [facility] placement  Often stays in bed  Refusing Lovenox  States she does not need Lovenox  Besides not taking Lovenox is medication compliant  Appears to be tolerating medications well without serious side effects      Current Medications:  Current Facility-Administered Medications   Medication Dose Route Frequency    acetaminophen (TYLENOL) tablet 650 mg  650 mg Oral Q6H PRN    albuterol (PROVENTIL HFA,VENTOLIN HFA) inhaler 2 puff  2 puff Inhalation Q4H PRN    aluminum-magnesium hydroxide-simethicone (MYLANTA) 200-200-20 mg/5 mL oral suspension 15 mL  15 mL Oral Q4H PRN    ammonium lactate (LAC-HYDRIN) 12 % lotion   Topical BID PRN    benzonatate (TESSALON PERLES) capsule 100 mg  100 mg Oral TID PRN    benztropine (COGENTIN) injection 1 mg  1 mg Intramuscular Q8H PRN    benztropine (COGENTIN) tablet 1 mg  1 mg Oral Q8H PRN    enoxaparin (LOVENOX) subcutaneous injection 40 mg  40 mg Subcutaneous Daily    fluticasone-vilanterol (BREO ELLIPTA) 200-25 MCG/INH inhaler 1 puff  1 puff Inhalation Daily    haloperidol (HALDOL) oral concentrated solution 1 mg  1 mg Oral Q6H PRN    haloperidol lactate (HALDOL) injection 2 mg  2 mg Intramuscular Q6H PRN    hydrOXYzine HCL (ATARAX) tablet 25 mg  25 mg Oral Q6H PRN    levothyroxine tablet 125 mcg  125 mcg Oral Early Morning    magnesium hydroxide (MILK OF MAGNESIA) 400 mg/5 mL oral suspension 30 mL  30 mL Oral Daily PRN    paliperidone (INVEGA) 24 hr tablet 12 mg  12 mg Oral Daily    Or    OLANZapine (ZyPREXA) IM injection 10 mg  10 mg Intramuscular Daily    pantoprazole (PROTONIX) EC tablet 40 mg  40 mg Oral Early Morning    polyethylene glycol (MIRALAX) packet 17 g  17 g Oral Daily PRN    QUEtiapine (SEROquel) tablet 50 mg  50 mg Oral HS    risperiDONE (RisperDAL M-TABS) dispersible tablet 1 mg  1 mg Oral Q8H PRN    theophylline (JEF-24) 24 hr capsule 200 mg  200 mg Oral Daily    traZODone (DESYREL) tablet 25 mg  25 mg Oral HS PRN    tuberculin injection 5 Units  5 Units Intradermal Once       Behavioral Health Medications: all current active meds have been reviewed and continue current psychiatric medications  Vitals:  Vitals:    06/16/19 0723   BP: (!) 81/53   Pulse: 91   Resp: 17   Temp: 97 8 °F (36 6 °C)   SpO2: 95%       Laboratory results:    I have personally reviewed all pertinent laboratory/tests results    Most Recent Labs:   Lab Results   Component Value Date    WBC 6 50 06/14/2019    RBC 4 09 06/14/2019    HGB 12 3 06/14/2019    HCT 38 2 06/14/2019     06/14/2019    RDW 13 6 06/14/2019    NEUTROABS 2 80 06/14/2019    SODIUM 138 05/31/2019    K 3 8 05/31/2019     05/31/2019    CO2 29 05/31/2019    BUN 11 05/31/2019    CREATININE 0 59 (L) 05/31/2019    GLUCOSE 85 05/18/2015    GLUC 84 05/31/2019    GLUF 84 05/31/2019    CALCIUM 9 5 05/31/2019    AST 23 06/14/2019    ALT 36 06/14/2019    ALKPHOS 82 06/14/2019    TP 6 3 06/14/2019    ALB 3 3 06/14/2019    TBILI 0 30 06/14/2019    CHOLESTEROL 131 05/02/2019    HDL 42 05/02/2019    TRIG 63 05/02/2019    LDLCALC 76 05/02/2019    NONHDLC 89 05/02/2019    LITHIUM <0 2 (L) 05/01/2019    AMMONIA 13 02/22/2016    PHQ9GIYVFSDH 4 810 (H) 05/02/2019    FREET4 1 4 05/12/2015    RPR Non-Reactive 05/02/2019    HGBA1C 5 5 01/17/2019     01/17/2019       Psychiatric Review of Systems:  Behavior over the last 24 hours:  improved  Sleep: normal  Appetite: normal  Medication side effects: No  ROS: no complaints    Mental Status Evaluation:  Appearance:  in hospital attire   Behavior:  cooperative   Speech:  normal pitch and normal volume   Mood:  anxious   Affect:  brighter   Language appropriate   Thought Process:  circumstantial   Thought Content:  Paranoid delusions, obsessions   Perceptual Disturbances: None   Risk Potential: Denied SI/HI  Potential for aggression: No   Sensorium:  person, place and time/date   Cognition:  grossly intact   Consciousness:  awake    Recent and Remote Memory fair   Attention: attention span appeared shorter than expected for age   Insight:  limited   Judgment: limited   Gait/Station: did not assess   Motor Activity: no abnormal movements     Progress Toward Goals: progressing slowly    Recommended Treatment: Continue with group therapy, milieu therapy and occupational therapy  1   Continue current medications    Risks, benefits and possible side effects of Medications:   Risks, benefits, and possible side effects of medications explained to patient and patient verbalizes understanding        Arthur Petty PA-C

## 2019-06-16 NOTE — PLAN OF CARE
Problem: Alteration in Thoughts and Perception  Goal: Treatment Goal: Gain control of psychotic behaviors/thinking, reduce/eliminate presenting symptoms and demonstrate improved reality functioning upon discharge  Outcome: Progressing  Goal: Verbalize thoughts and feelings  Description  Interventions:  - Promote a nonjudgmental and trusting relationship with the patient through active listening and therapeutic communication  - Assess patient's level of functioning, behavior and potential for risk  - Engage patient in 1 on 1 interactions for a minimum of 15 minutes each session  - Encourage patient to express fears, feelings, frustrations, and discuss symptoms    - [place] patient to reality, help patient recognize reality-based thinking   - Administer medications as ordered and assess for potential side effects  - Provide the patient education related to the signs and symptoms of the illness and desired effects of prescribed medications  Outcome: Progressing  Goal: Refrain from acting on delusional thinking/internal stimuli  Description  Interventions:  - Monitor patient closely, per order   - Utilize least restrictive measures   - Set reasonable limits, give positive feedback for acceptable   - Administer medications as ordered and monitor of potential side effects  Outcome: Progressing  Goal: Agree to be compliant with medication regime, as prescribed and report medication side effects  Description  Interventions:  - Offer appropriate PRN medication and supervise ingestion; conduct aims, as needed   Outcome: Progressing  Goal: Recognize dysfunctional thoughts, communicate reality-based thoughts at the time of discharge  Description  Interventions:  - Provide medication and psycho-education to assist patient in compliance and developing insight into his/her illness   Outcome: Progressing  Goal: Complete daily ADLs, including personal hygiene independently, as able  Description  Interventions:  - Observe, teach, and assist patient with ADLS  - Monitor and promote a balance of rest/activity, with adequate nutrition and elimination   Outcome: Progressing     Problem: Ineffective Coping  Goal: Identifies ineffective coping skills  Outcome: Progressing  Goal: Identifies healthy coping skills  Outcome: Progressing  Goal: Demonstrates healthy coping skills  Outcome: Progressing  Goal: Participates in unit activities  Description  Interventions:  - Provide therapeutic environment   - Provide required programming   - Redirect inappropriate behaviors   Outcome: Progressing  Goal: Patient/Family participate in treatment and DC plans  Description  Interventions:  - Provide therapeutic environment  Outcome: Progressing  Goal: Patient/Family verbalizes awareness of resources  Outcome: Progressing  Goal: Understands least restrictive measures  Description  Interventions:  - Utilize least restrictive behavior  Outcome: Progressing  Goal: Free from restraint events  Description  - Utilize least restrictive measures   - Provide behavioral interventions   - Redirect inappropriate behaviors   Outcome: Progressing     Problem: Risk for Self Injury/Neglect  Goal: Treatment Goal: Remain safe during length of stay, learn and adopt new coping skills, and be free of self-injurious ideation, impulses and acts at the time of discharge  Outcome: Progressing  Goal: Verbalize thoughts and feelings  Description  Interventions:  - Assess and re-assess patient's lethality and potential for self-injury  - Engage patient in 1:1 interactions, daily, for a minimum of 15 minutes  - Encourage patient to express feelings, fears, frustrations, hopes  - Establish rapport/trust with patient   Outcome: Progressing  Goal: Refrain from harming self  Description  Interventions:  - Monitor patient closely, per order  - Develop a trusting relationship  - Supervise medication ingestion, monitor effects and side effects   Outcome: Progressing  Goal: Recognize maladaptive responses and adopt new coping mechanisms  Outcome: Progressing  Goal: Complete daily ADLs, including personal hygiene independently, as able  Description  Interventions:  - Observe, teach, and assist patient with ADLS  - Monitor and promote a balance of rest/activity, with adequate nutrition and elimination  Outcome: Progressing     Problem: Depression  Goal: Treatment Goal: Demonstrate behavioral control of depressive symptoms, verbalize feelings of improved mood/affect, and adopt new coping skills prior to discharge  Outcome: Progressing  Goal: Verbalize thoughts and feelings  Description  Interventions:  - Assess and re-assess patient's level of risk   - Engage patient in 1:1 interactions, daily, for a minimum of 15 minutes   - Encourage patient to express feelings, fears, frustrations, hopes   Outcome: Progressing  Goal: Refrain from harming self  Description  Interventions:  - Monitor patient closely, per order   - Supervise medication ingestion, monitor effects and side effects   Outcome: Progressing  Goal: Refrain from isolation  Description  Interventions:  - Develop a trusting relationship   - Encourage socialization   Outcome: Progressing  Goal: Refrain from self-neglect  Outcome: Progressing  Goal: Attend and participate in unit activities, including therapeutic, recreational, and educational groups  Description  Interventions:  - Provide therapeutic and educational activities daily, encourage attendance and participation, and document same in the medical record   Outcome: Progressing  Goal: Complete daily ADLs, including personal hygiene independently, as able  Description  Interventions:  - Observe, teach, and assist patient with ADLS  -  Monitor and promote a balance of rest/activity, with adequate nutrition and elimination   Outcome: Progressing     Problem: Anxiety  Goal: Anxiety is at manageable level  Description  Interventions:  - Assess and monitor patient's anxiety level     - Monitor for signs and symptoms of anxiety both physical and emotional (heart palpitations, chest pain, shortness of breath, headaches, nausea, feeling jumpy, restlessness, irritable, apprehensive)  - Collaborate with interdisciplinary team and initiate plan and interventions as ordered    - [place] patient to unit/surroundings  - Explain treatment plan  - Encourage participation in care  - Encourage verbalization of concerns/fears  - Identify coping mechanisms  - Assist in developing anxiety-reducing skills  - Administer/offer alternative therapies  - Limit or eliminate stimulants  Outcome: Progressing     Problem: DISCHARGE PLANNING - CARE MANAGEMENT  Goal: Discharge to post-acute care or home with appropriate resources  Description  INTERVENTIONS:  - Conduct assessment to determine patient/family and health care team treatment goals, and need for post-acute services based on payer coverage, community resources, and patient preferences, and barriers to discharge  - Address psychosocial, clinical, and financial barriers to discharge as identified in assessment in conjunction with the patient/family and health care team  - Arrange appropriate level of post-acute services according to patients   needs and preference and payer coverage in collaboration with the physician and health care team  - Communicate with and update the patient/family, physician, and health care team regarding progress on the discharge plan  - Arrange appropriate transportation to post-acute venues  Outcome: Progressing     Problem: Prexisting or High Potential for Compromised Skin Integrity  Goal: Skin integrity is maintained or improved  Description  INTERVENTIONS:  - Identify patients at risk for skin breakdown  - Assess and monitor skin integrity  - Assess and monitor nutrition and hydration status  - Monitor labs (i e  albumin)  - Assess for incontinence   - Turn and reposition patient  - Assist with mobility/ambulation  - Relieve pressure over bony prominences  - Avoid friction and shearing  - Provide appropriate hygiene as needed including keeping skin clean and dry  - Evaluate need for skin moisturizer/barrier cream  - Collaborate with interdisciplinary team (i e  Nutrition, Rehabilitation, etc )   - Patient/family teaching  Outcome: Progressing     Problem: Alteration in Thoughts and Perception  Goal: Attend and participate in unit activities, including therapeutic, recreational, and educational groups  Description  Interventions:  - Provide therapeutic and educational activities daily, encourage attendance and participation, and document same in the medical record   Outcome: Not Progressing     Problem: Risk for Self Injury/Neglect  Goal: Attend and participate in unit activities, including therapeutic, recreational, and educational groups  Description  Interventions:  - Provide therapeutic and educational activities daily, encourage attendance and participation, and document same in the medical record  - Obtain collateral information, encourage visitation and family involvement in care   Outcome: Not Progressing

## 2019-06-16 NOTE — NURSING NOTE
Pt in bed with eyes closed and even respirations observed  No distress or discomfort noted, 7 min checks maintained for safety and support

## 2019-06-16 NOTE — PROGRESS NOTES
Pt was paranoyed- asked to show the name of pill before she took her HS seroquel  She stated she was a bit unsteady and that she should take her pill in a room  Pt was assisted to room,  will be monitored for safety and fall precautions

## 2019-06-16 NOTE — PROGRESS NOTES
Patient is alert and oriented times 4  Is isolative to her room this morning and states she wants to sleep because did not sleep well last night  Denies SI/HI  Accepts all morning pills with exception of Lovenox  Is meals compliant with fair appetite  Patient has been encouraged for socialization and walking exercises for a better mental health recovery  Will keep monitoring for safety and support

## 2019-06-17 PROCEDURE — 97530 THERAPEUTIC ACTIVITIES: CPT

## 2019-06-17 PROCEDURE — 99232 SBSQ HOSP IP/OBS MODERATE 35: CPT | Performed by: NURSE PRACTITIONER

## 2019-06-17 RX ADMIN — FLUTICASONE FUROATE AND VILANTEROL TRIFENATATE 1 PUFF: 200; 25 POWDER RESPIRATORY (INHALATION) at 08:26

## 2019-06-17 RX ADMIN — LEVOTHYROXINE SODIUM 125 MCG: 125 TABLET ORAL at 06:02

## 2019-06-17 RX ADMIN — QUETIAPINE FUMARATE 50 MG: 50 TABLET, FILM COATED ORAL at 21:27

## 2019-06-17 RX ADMIN — THEOPHYLLINE ANHYDROUS 200 MG: 200 CAPSULE, EXTENDED RELEASE ORAL at 08:26

## 2019-06-17 RX ADMIN — PALIPERIDONE 12 MG: 6 TABLET, EXTENDED RELEASE ORAL at 08:26

## 2019-06-17 RX ADMIN — PANTOPRAZOLE SODIUM 40 MG: 40 TABLET, DELAYED RELEASE ORAL at 06:02

## 2019-06-17 NOTE — OCCUPATIONAL THERAPY NOTE
Occupational Therapy Activity Treatment Note      Laina Listen    6/17/2019    Patient Active Problem List   Diagnosis    Sepsis [facility]    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[facility]    Tobacco use disorder, continuous    Bipolar disorder ([facility]    Left hip pain    Lactic acidosis    Hypokalemia    Hypomagnesemia    Compression fracture of L4 lumbar vertebra    Thoracic compression fracture (HCC)    Ventral hernia    Parapneumonic effusion    Acute on chronic respiratory failure with hypoxia (HCC)    Chronic respiratory failure (HCC)    Hypophosphatemia    Elevated MCV    Compression deformity of vertebra    Schizoaffective disorder, bipolar type ([facility]    Acquired hypothyroidism    Gastroesophageal reflux disease without esophagitis    Abnormal CT of the chest    Excessive cerumen in left ear canal    Lipoma of right upper extremity    Polydipsia    Localized swelling of both lower legs       Past Medical History:   Diagnosis Date    Acid reflux     Anxiety     Asthma     Bipolar 1 disorder (HCC)     Chronic pain disorder     Chronic respiratory failure (HCC)     Compression fracture of fourth lumbar vertebra (HCC)     COPD (chronic obstructive pulmonary disease) (HCC)     Depression     GERD (gastroesophageal reflux disease)     History of home oxygen therapy     Hypothyroidism     Lipoma of upper extremity     Psychiatric illness     Schizoaffective disorder ([facility]     [facility]     Nicotine    Thoracic compression fracture [facility]     Ventral hernia        No past surgical history on file      06/17/19 2002   Assessment   Assessment Ernesot Marrero was pleasant on approach, agreeable to OT activity involvement  She was sitting on her bed wearing her O2, she stated that she did not wish to come into the dayroom because the lights in the room frequently bothered her  She did express interest in attending to a velvet picture fill in  She was attentive, she did make color selections   She did talk about bathing, challenges associated with bathing  She talked about her sister, that her sister was not able to visit her but she was going to call her to visit her either today or tomorrow  She did express some concerns as this related to other people's motivations, and she tended to be somewhat dramatic when doing so  She was pleasant and reality focussed in her direct interactions with this writer  Progress has been slow, she does appear motivated for activity involvement when this is provided in her room  Plan   Treatment Interventions ADL retraining; Endurance training;Continued evaluation; Activityengagement  (coping skills, reality focus, life management)   Goal Expiration Date 07/14/19   Treatment Day 4   OT Frequency 2-3x/wk   Dolores Lagos OT

## 2019-06-17 NOTE — PROGRESS NOTES
Awake and visible during breakfast then isolates to her room  Pt was compliant with medication, except Lovenox that she continues to refused despite education provided  Pt continues to be paranoid about her medication,stated " made sure you tell the doctor to stop Invega,make me very sexual,and I have trouble control does urges's"  Continue on 2L O2,denies SOB,no distress noted  Will continue to monitor closely

## 2019-06-17 NOTE — PROGRESS NOTES
Progress Note - Behavioral Health   Evelin Morrow 66 y o  female MRN: [id number]  Unit/Bed#: [facility] 262-15 Encounter: [id number]    The patient was seen for continuing care and reviewed with treatment team  Staff reports that the patient remains self isolating to room, but continues to come out of bed for meals  Continues to remain somatic and paranoid about medications and needs encouragement for compliance  Attends select groups  During assessment the patient was pleasant and appropriate during conversation but remained focused on her "increased sex drive from her current medications " Denies any thoughts to hurt herself or others  Denies any auditory or visual hallucinations  Continues to appear paranoid, suspicious, and delusional about her oxygen and medications  Reports that she is sleeping and eating well  Currently awaiting for long term treatment      Mental Status Evaluation:  Appearance:  Marginal/poor hygiene   Behavior:  cooperative   Fund of knowledge  aware of current events and Aware of past history   Speech:   Language: Normal rate and Normal volume  No overt abnormality   Mood:  improving   Affect:   Associations: blunted  Intact   Thought Process:  Circumstantial   Thought Content:  Paranoid and mistrustful and Obsessive ruminations   Perceptual Disturbances: Denies hallucinations and does not appear to be responding to internal stimuli   Risk Potential: No suicidal or homicidal ideation   Orientation  Oriented x 3   Memory No deficits   Attention/Concentration attention span appeared shorter than expected for age   Insight:  No insight   Judgment: Poor judgment   Gait/Station: normal gait/station and normal balance   Motor Activity: No abnormal movement noted     Progress Toward Goals: slightly improved    Assessment/Plan    Principal Problem:    Schizoaffective disorder, bipolar type ([facility])  Active Problems:    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[facility] )    Acquired hypothyroidism    Gastroesophageal reflux disease without esophagitis      Recommended Treatment: Continue with pharmacotherapy, group therapy, milieu therapy and occupational therapy  The patient will be maintained on the following medications:    Current Facility-Administered Medications:  acetaminophen 650 mg Oral Q6H PRN Kimmie Woodall, MD   albuterol 2 puff Inhalation Q4H PRN ABILIO Mccray   aluminum-magnesium hydroxide-simethicone 15 mL Oral Q4H PRN Priscilla Chew MD   ammonium lactate  Topical BID PRN ABILIO Mccray   benzonatate 100 mg Oral TID PRN Kimmie Woodall, MD   benztropine 1 mg Intramuscular Q8H PRN Priscilla Chew MD   benztropine 1 mg Oral Q8H PRN Priscilla Chew MD   enoxaparin 40 mg Subcutaneous Daily Kimmie Woodall, MD   fluticasone-vilanterol 1 puff Inhalation Daily Saranya Sanders MD   haloperidol 1 mg Oral Q6H PRN Cindi Ford MD   haloperidol lactate 2 mg Intramuscular Q6H PRN Cindi Ford MD   hydrOXYzine HCL 25 mg Oral Q6H PRN Priscilla Chew MD   levothyroxine 125 mcg Oral Early Morning Kimmie Woodall, MD   magnesium hydroxide 30 mL Oral Daily PRN Chasity Mcknight MD   paliperidone 12 mg Oral Daily ABILIO Mccray   Or       OLANZapine 10 mg Intramuscular Daily ABILIO Mccray   pantoprazole 40 mg Oral Early Morning Kimmie Woodall, MD   polyethylene glycol 17 g Oral Daily PRN ABILIO Mccray   QUEtiapine 50 mg Oral HS Ingris Joseph PA-C   risperiDONE 1 mg Oral Q8H PRN Priscilla Chew MD   theophylline 200 mg Oral Daily Kimmie Woodall MD   traZODone 25 mg Oral HS PRN Kimmie Woodall, MD   tuberculin 5 Units Intradermal Once Cindi Ford MD       Risks, benefits and possible side effects of Medications:   Risks, benefits, and possible side effects of medications explained to patient and patient verbalizes understanding

## 2019-06-17 NOTE — PROGRESS NOTES
Pt was very pleasant in the morning, stated she was sleeping well  She was keeping O2 2L all night  Was compliant with am med

## 2019-06-17 NOTE — PLAN OF CARE
Problem: OCCUPATIONAL THERAPY ADULT  Goal: Performs self-care activities at highest level of function for planned discharge setting  See evaluation for individualized goals  Description  Treatment Interventions: ADL retraining, Endurance training, Continued evaluation, Activityengagement(coping skills, reality focus, life management)          See flowsheet documentation for full assessment, interventions and recommendations  Outcome: Progressing  Note:   Limitation: Decreased ADL status, Decreased high-level ADLs, Mood limitation(guarded, limited coping and life management skills)  Prognosis: Fair  Assessment: Emiliano Antonio was pleasant on approach, agreeable to OT activity involvement  She was sitting on her bed wearing her O2, she stated that she did not wish to come into the dayroom because the lights in the room frequently bothered her  She did express interest in attending to a velvet picture fill in  She was attentive, she did make color selections  She did talk about bathing, challenges associated with bathing  She talked about her sister, that her sister was not able to visit her but she was going to call her to visit her either today or tomorrow  She did express some concerns as this related to other people's motivations, and she tended to be somewhat dramatic when doing so  She was pleasant and reality focussed in her direct interactions with this writer  Progress has been slow, she does appear motivated for activity involvement when this is provided in her room

## 2019-06-18 LAB — GLUCOSE SERPL-MCNC: 90 MG/DL (ref 65–140)

## 2019-06-18 PROCEDURE — 97530 THERAPEUTIC ACTIVITIES: CPT

## 2019-06-18 PROCEDURE — 82948 REAGENT STRIP/BLOOD GLUCOSE: CPT

## 2019-06-18 PROCEDURE — 99232 SBSQ HOSP IP/OBS MODERATE 35: CPT | Performed by: NURSE PRACTITIONER

## 2019-06-18 RX ADMIN — QUETIAPINE FUMARATE 50 MG: 50 TABLET, FILM COATED ORAL at 21:18

## 2019-06-18 RX ADMIN — PANTOPRAZOLE SODIUM 40 MG: 40 TABLET, DELAYED RELEASE ORAL at 05:54

## 2019-06-18 RX ADMIN — FLUTICASONE FUROATE AND VILANTEROL TRIFENATATE 1 PUFF: 200; 25 POWDER RESPIRATORY (INHALATION) at 08:39

## 2019-06-18 RX ADMIN — THEOPHYLLINE ANHYDROUS 200 MG: 200 CAPSULE, EXTENDED RELEASE ORAL at 08:39

## 2019-06-18 RX ADMIN — LEVOTHYROXINE SODIUM 125 MCG: 125 TABLET ORAL at 05:54

## 2019-06-18 RX ADMIN — PALIPERIDONE 12 MG: 6 TABLET, EXTENDED RELEASE ORAL at 08:39

## 2019-06-18 NOTE — PLAN OF CARE
Problem: OCCUPATIONAL THERAPY ADULT  Goal: Performs self-care activities at highest level of function for planned discharge setting  See evaluation for individualized goals  Description  Treatment Interventions: ADL retraining, Endurance training, Continued evaluation, Activityengagement(coping skills, reality focus, life management)          See flowsheet documentation for full assessment, interventions and recommendations  Outcome: Progressing  Note:   Limitation: Decreased ADL status, Decreased high-level ADLs, Mood limitation(guarded, limited coping and life management skills)  Prognosis: Fair  Assessment: Patsy was pleasant, invested in OT Activity program  She was encouraged to come out of her room, but she did state that she preferred to stay in her room on this day  She did request involvement in a cognitive task, she wanted to learn how to play a new card game ([employer])  She was attentive to the rules, she did remember some of them  She did briefly talk about the battery in her arm and how she can hear conversations that others can't hear because of it, but she did not perseverate on this  She was pleasant, she talked about memories of working at the [employer]  She did initiate relevant questions  Progress has been consistent towards her goals  Continue to promote positive focus, reality based give and take interactions with others  She did remain positive throughout her involvement with this writer on this occasion

## 2019-06-18 NOTE — PROGRESS NOTES
O2 at 2L via nasal cannula  Currently in bed with eyes closed, appears to be resting calmly  Monitored on Q 7 minute safety checks  Not voicing any complaints

## 2019-06-18 NOTE — PROGRESS NOTES
Progress Note - Behavioral Health   Davidson Goetz 66 y o  female MRN: [id number]  Unit/Bed#: Evelyne Black 250-02 Encounter: [id number]    The patient was seen for continuing care and reviewed with treatment team  Staff reports that the patient remains delusional, self isolating to room, and minimally interactive with her peers and staff  Continues to remain focused on oxygen and remains somatic with her health care needs  During assessment the patient remains pleasant on approach, but continues to be paranoid and suspicious in nature  Denies any delusional thoughts, but remains focused on "people in the hospital attempting to kill her"  Remains slightly guarded with issues  Encouraged the patient to walk in the hallways and eat and drink her meals  Provided additional support that the patient is safe in the hospital  Will continue to monitor behavior  If behavior remains will increase Seroquel tomorrow, despite patient's noncompliance with medication changes       Mental Status Evaluation:  Appearance:  Marginal/poor hygiene   Behavior:  Cooperative, but guarded and arguementative   Fund of knowledge  aware of current events and Aware of past history   Speech:   Language: wnl  No overt abnormality   Mood:  Fearful; paranoid   Affect:   Associations: blunted  Intact   Thought Process:  Circumstantial   Thought Content:  Delusions of persecution, Obsessive ruminations and is somatically preoccupied   Perceptual Disturbances: Denies hallucinations and does not appear to be responding to internal stimuli   Risk Potential: No suicidal or homicidal ideation   Orientation  Oriented x 3   Memory No deficits   Attention/Concentration attention span appeared shorter than expected for age   Insight:  No insight   Judgment: Poor judgment   Gait/Station: Not observed   Motor Activity: No abnormal movement noted     Progress Toward Goals: unchanged    Assessment/Plan    Principal Problem:    Schizoaffective disorder, bipolar type [facility])  Active Problems:    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[address] )    Acquired hypothyroidism    Gastroesophageal reflux disease without esophagitis      Recommended Treatment: Continue with pharmacotherapy, group therapy, milieu therapy and occupational therapy  The patient will be maintained on the following medications:    Current Facility-Administered Medications:  acetaminophen 650 mg Oral Q6H PRN Eze Trivedi MD   albuterol 2 puff Inhalation Q4H PRN ABILIO Tam   aluminum-magnesium hydroxide-simethicone 15 mL Oral Q4H PRN Lucie Aviles MD   ammonium lactate  Topical BID PRN ABILIO Tam   benzonatate 100 mg Oral TID PRN Eze Trivedi MD   benztropine 1 mg Intramuscular Q8H PRN Lucie Aviles MD   benztropine 1 mg Oral Q8H PRN Lucie Aviles MD   enoxaparin 40 mg Subcutaneous Daily Eze Trivedi MD   fluticasone-vilanterol 1 puff Inhalation Daily Kendrick Ramirez MD   haloperidol 1 mg Oral Q6H PRN Conway Bamberger, MD   haloperidol lactate 2 mg Intramuscular Q6H PRN Conway Bamberger, MD   hydrOXYzine HCL 25 mg Oral Q6H PRN Lucie Aviles MD   levothyroxine 125 mcg Oral Early Morning Eze Trivedi MD   magnesium hydroxide 30 mL Oral Daily PRN Hong Erickson MD   paliperidone 12 mg Oral Daily ABILIO Tam   Or       OLANZapine 10 mg Intramuscular Daily ABILIO Tam   pantoprazole 40 mg Oral Early Morning Eze Trivedi MD   polyethylene glycol 17 g Oral Daily PRN ABILIO Tam   QUEtiapine 50 mg Oral HS Akilah De Los Santos PA-C   risperiDONE 1 mg Oral Q8H PRN Lucie Aviles MD   theophylline 200 mg Oral Daily Eze Trivedi MD   traZODone 25 mg Oral HS PRN Eze Trivedi MD   tuberculin 5 Units Intradermal Once Conway Bamberger, MD       Risks, benefits and possible side effects of Medications:   Risks, benefits, and possible side effects of medications explained to patient and patient verbalizes understanding

## 2019-06-18 NOTE — PROGRESS NOTES
Awake and visible during meals,then isolative to her room  Pt is compliant with her medication,exept for Lovenox that she refused despite education provided  Pt stated that her sister told her that some old people are having trouble swallowing water,and now she beliefs is having trouble to,and should only drink juice  However no swallowing issues noted,and the importance of drinking water was reinforce   Will continue to monitor and encourage participation in groups

## 2019-06-18 NOTE — OCCUPATIONAL THERAPY NOTE
Occupational Therapy Activity Treatment Note      Estela Chung    6/18/2019    Patient Active Problem List   Diagnosis    Sepsis [facility]    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[facility]    Tobacco use disorder, continuous    Bipolar disorder ([facility]    Left hip pain    Lactic acidosis    Hypokalemia    Hypomagnesemia    Compression fracture of L4 lumbar vertebra    Thoracic compression fracture (HCC)    Ventral hernia    Parapneumonic effusion    Acute on chronic respiratory failure with hypoxia (HCC)    Chronic respiratory failure (HCC)    Hypophosphatemia    Elevated MCV    Compression deformity of vertebra    Schizoaffective disorder, bipolar type ([facility]    Acquired hypothyroidism    Gastroesophageal reflux disease without esophagitis    Abnormal CT of the chest    Excessive cerumen in left ear canal    Lipoma of right upper extremity    Polydipsia    Localized swelling of both lower legs       Past Medical History:   Diagnosis Date    Acid reflux     Anxiety     Asthma     Bipolar 1 disorder (HCC)     Chronic pain disorder     Chronic respiratory failure (HCC)     Compression fracture of fourth lumbar vertebra (HCC)     COPD (chronic obstructive pulmonary disease) (HCC)     Depression     GERD (gastroesophageal reflux disease)     History of home oxygen therapy     Hypothyroidism     Lipoma of upper extremity     Psychiatric illness     Schizoaffective disorder ([facility]     Substance abuse ([facility] )     Nicotine    Thoracic compression fracture ([facility]     Ventral hernia        No past surgical history on file      06/18/19 1345   Assessment   Assessment Patsy was pleasant, invested in OT Activity program  She was encouraged to come out of her room, but she did state that she preferred to stay in her room on this day  She did request involvement in a cognitive task, she wanted to learn how to play a new card game ([employer])  She was attentive to the rules, she did remember some of them   She did briefly talk about the battery in her arm and how she can hear conversations that others can't hear because of it, but she did not perseverate on this  She was pleasant, she talked about memories of working at the [employer]  She did initiate relevant questions  Progress has been consistent towards her goals  Continue to promote positive focus, reality based give and take interactions with others  She did remain positive throughout her involvement with this writer on this occasion  Plan   Treatment Interventions ADL retraining; Endurance training;Continued evaluation; Activityengagement  (coping skills, reality focus, life management)   Goal Expiration Date 07/14/19   Treatment Day 5   OT Frequency 2-3x/wk   Raeann Patten, OT

## 2019-06-18 NOTE — PROGRESS NOTES
06/18/19 0954   Team Meeting   Meeting Type Daily Rounds   Next Conference Date 06/19/19   Team Members Present   Team Members Present Physician;;Nurse;Occupational Therapist   Physician Team Member Jose Guzman   Nursing Team Member [id number]   Care Management Team Member Dallas Flores   Patient/Family Present   Patient Present No   Patient's Family Present No     Pt reports having "hypersexual" symptoms because of Invega  [facility], pt meets criteria for EAC  There are concerns regarding pt requiring Oxygen 24 hours per day  Pt's sister has been in to visit  Pt will not sign an VERNA for sister

## 2019-06-18 NOTE — CASE MANAGEMENT
Writer met with patient 1:1  Patient informed writer that her sister brought her in flowers however "they are not allowed in my room because it is in rubber and the rubber interacts with the device in my arm and I can hear everyone more  I will hear my niece screaming my name as she comes to see me and I can't have that again " Writer asked patient if she could sign a release for her sister since they have been having good visits, patient stated, "no one here is to speak with her, I can relay anything important things to her " Writer asked her to reconsider, patient will think on it  CM will continue to follow and provide services as needed

## 2019-06-18 NOTE — PROGRESS NOTES
Patient is calm and pleasant  She continues to spend a great deal of time in her room  She dislikes many people but is superficially polite  She continues to be suspicious about people harming her  O2 at 2L/min   No compliants

## 2019-06-18 NOTE — PROGRESS NOTES
Patient is social with select peers  Out of room for mostly meds and meals  O2 at 2L/minute continuous  Positivity and reality focusing stressed  Pleasant   No SI  At 2200 Asking for "someone to come in and listen to my lungs" (Meanwhile, nurse was just in the room to give meds and patient carried on a complete conversation with no SOB noted then or during the evening)

## 2019-06-19 PROCEDURE — 99232 SBSQ HOSP IP/OBS MODERATE 35: CPT | Performed by: NURSE PRACTITIONER

## 2019-06-19 RX ORDER — QUETIAPINE FUMARATE 50 MG/1
50 TABLET, EXTENDED RELEASE ORAL
Status: DISCONTINUED | OUTPATIENT
Start: 2019-06-19 | End: 2019-06-20

## 2019-06-19 RX ADMIN — THEOPHYLLINE ANHYDROUS 200 MG: 200 CAPSULE, EXTENDED RELEASE ORAL at 08:19

## 2019-06-19 RX ADMIN — PANTOPRAZOLE SODIUM 40 MG: 40 TABLET, DELAYED RELEASE ORAL at 06:00

## 2019-06-19 RX ADMIN — FLUTICASONE FUROATE AND VILANTEROL TRIFENATATE 1 PUFF: 200; 25 POWDER RESPIRATORY (INHALATION) at 08:19

## 2019-06-19 RX ADMIN — LEVOTHYROXINE SODIUM 125 MCG: 125 TABLET ORAL at 06:00

## 2019-06-19 RX ADMIN — QUETIAPINE FUMARATE 50 MG: 50 TABLET, FILM COATED, EXTENDED RELEASE ORAL at 21:29

## 2019-06-19 RX ADMIN — PALIPERIDONE 12 MG: 6 TABLET, EXTENDED RELEASE ORAL at 08:19

## 2019-06-19 NOTE — PROGRESS NOTES
Awake and isolative to her room between meals  Pt continues to be somatic,paranoid and suspicious of others  Declined groups  Ortho BP completed and was negative  Pt did ambulated on the unit once using portable O2, no issues noted while ambulating  Med compliant, except Lovenox that she continues to be refusing despite education provided  Will continue to monitor closely

## 2019-06-19 NOTE — CASE MANAGEMENT
Niki Luis, will be up on Friday at 1:00pm to meet with patient regarding EAC  CM will continue to follow and provide services as needed

## 2019-06-19 NOTE — PROGRESS NOTES
Progress Note - Behavioral Health   Avni Garnett 66 y o  female MRN: [id number]  Unit/Bed#: Odalys Kuhn 333-41 Encounter: [id number]    The patient was seen for continuing care and reviewed with treatment team  Staff reports that the patient remains somatic, paranoid, and suspicious of others  Patient attends selective groups, but continues to remain self isolating to room  Continues to remain medication compliant  During assessment the patient remains focused on somatic health care needs, such as blood pressure, oxygen, or ambulation needs  Due to complaints will order orthostatic blood pressures daily  Will change Seroquel to XR for decreased side effects and monitor  If adverse side effects remain benign will continue to titrate Seroquel for ongoing paranoia and delusional thoughts  Patient reports that she is eating and sleeping well  Denies any auditory or visual hallucinations  Denies any depressive symptoms or anxiety       Mental Status Evaluation:  Appearance:  Adequate hygiene and grooming   Behavior:  cooperative; self isolating   Fund of knowledge  aware of current events and Aware of past history   Speech:   Language: Normal rate and Normal volume  No overt abnormality   Mood:  stable   Affect:   Associations: blunted  Intact   Thought Process:  linear   Thought Content:  Paranoid and mistrustful, Delusions of persecution and Grandiose delusions   Perceptual Disturbances: Denies hallucinations and does not appear to be responding to internal stimuli   Risk Potential: No suicidal or homicidal ideation   Orientation  Oriented x 3   Memory No deficits   Attention/Concentration attention span appeared shorter than expected for age   Insight:  No insight   Judgment: Poor judgment   Gait/Station: normal gait/station and normal balance   Motor Activity: No abnormal movement noted     Progress Toward Goals: unchanged    Assessment/Plan    Principal Problem:    Schizoaffective disorder, bipolar type [facility])  Active Problems:    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[address] )    Acquired hypothyroidism    Gastroesophageal reflux disease without esophagitis      Recommended Treatment: Continue with pharmacotherapy, group therapy, milieu therapy and occupational therapy  The patient will be maintained on the following medications:    Current Facility-Administered Medications:  acetaminophen 650 mg Oral Q6H PRN Hollie Rubin MD   albuterol 2 puff Inhalation Q4H PRN [place] Brenden, CRNP   aluminum-magnesium hydroxide-simethicone 15 mL Oral Q4H PRN Rosa Arroyo MD   ammonium lactate  Topical BID PRN [place] Brenden, CRNP   benzonatate 100 mg Oral TID PRN Hollie Rubin MD   benztropine 1 mg Intramuscular Q8H PRN Rosa Arroyo MD   benztropine 1 mg Oral Q8H PRN Rosa Arroyo MD   enoxaparin 40 mg Subcutaneous Daily Hollie Rubin MD   fluticasone-vilanterol 1 puff Inhalation Daily Josh Gonzalez MD   haloperidol 1 mg Oral Q6H PRN Rhiannon Mcmillan MD   haloperidol lactate 2 mg Intramuscular Q6H PRN Rhiannon Mcmillan MD   hydrOXYzine HCL 25 mg Oral Q6H PRN Rosa Arroyo MD   levothyroxine 125 mcg Oral Early Morning Hollie Rubin MD   magnesium hydroxide 30 mL Oral Daily PRN Uday Lewis MD   paliperidone 12 mg Oral Daily [place] Brenden, CRNP   Or       OLANZapine 10 mg Intramuscular Daily [place] Brenden, CRNP   pantoprazole 40 mg Oral Early Morning Hollie Rubin MD   polyethylene glycol 17 g Oral Daily PRN [place] Brenden, CRNP   QUEtiapine 50 mg Oral HS [place] Brenden, CRNP   risperiDONE 1 mg Oral Q8H PRN Rosa Arroyo MD   theophylline 200 mg Oral Daily Hollie Rubin MD   traZODone 25 mg Oral HS PRN Hollie Rubin MD   tuberculin 5 Units Intradermal Once Rhiannon Mcmillan MD       Risks, benefits and possible side effects of Medications:   Risks, benefits, and possible side effects of medications explained to patient and patient verbalizes understanding

## 2019-06-19 NOTE — PROGRESS NOTES
Monitored on safety checks  Currently in bed with eyes closed and respirations noted  On O2 at 2 l  Via nasal cannula   Denies SI's  Appears to be resting calmly

## 2019-06-20 PROCEDURE — 99232 SBSQ HOSP IP/OBS MODERATE 35: CPT | Performed by: NURSE PRACTITIONER

## 2019-06-20 RX ORDER — QUETIAPINE FUMARATE 50 MG/1
100 TABLET, EXTENDED RELEASE ORAL
Status: DISCONTINUED | OUTPATIENT
Start: 2019-06-20 | End: 2019-06-24

## 2019-06-20 RX ADMIN — PALIPERIDONE 12 MG: 6 TABLET, EXTENDED RELEASE ORAL at 08:49

## 2019-06-20 RX ADMIN — QUETIAPINE FUMARATE 100 MG: 50 TABLET, EXTENDED RELEASE ORAL at 21:21

## 2019-06-20 RX ADMIN — PANTOPRAZOLE SODIUM 40 MG: 40 TABLET, DELAYED RELEASE ORAL at 05:33

## 2019-06-20 RX ADMIN — THEOPHYLLINE ANHYDROUS 200 MG: 200 CAPSULE, EXTENDED RELEASE ORAL at 08:49

## 2019-06-20 RX ADMIN — LEVOTHYROXINE SODIUM 125 MCG: 125 TABLET ORAL at 05:33

## 2019-06-20 RX ADMIN — FLUTICASONE FUROATE AND VILANTEROL TRIFENATATE 1 PUFF: 200; 25 POWDER RESPIRATORY (INHALATION) at 08:51

## 2019-06-20 NOTE — PROGRESS NOTES
Progress Note - Behavioral Health   Hamzah Burger 60 y o  female MRN: [id number]  Unit/Bed#: Nayana Sánchez 796-48 Encounter: [id number]    The patient was seen for continuing care and reviewed with treatment team  Staff reports that the patient continues to remain somatic, delusional, and self isolating to room  Remains focused on blood pressures and oxygen concentration  During assessment the patient was appropriate and pleasant  Remains paranoid and suspicious regarding medication administration and remains focused on switching medications "due to side effects" that are not justified objectively  Patient reports that she is eating and sleeping well  Remains focused on treatment plan and frequently wants to differ plan to another alternative  Reports statements to the staff that are not accurate in order to decline treatment  Will continue to titrate Seroquel in order to address fixed delusions and paranoia       Mental Status Evaluation:  Appearance:  Marginal/poor hygiene   Behavior:  cooperative and Superficial   Fund of knowledge  Diminished   Speech:   Language: Normal rate and Normal volume  No overt abnormality   Mood:  stable   Affect:   Associations: blunted  Intact   Thought Process:  Circumstantial   Thought Content:  Paranoid and mistrustful, Delusions of persecution, Obsessive ruminations and is somatically preoccupied   Perceptual Disturbances: Denies hallucinations and does not appear to be responding to internal stimuli   Risk Potential: No suicidal or homicidal ideation   Orientation  Oriented x 3   Memory No deficits   Attention/Concentration attention span appeared shorter than expected for age   Insight:  limited   Judgment: Limited   Gait/Station: normal gait/station and normal balance   Motor Activity: No abnormal movement noted     Progress Toward Goals: improving    Assessment/Plan    Principal Problem:    Schizoaffective disorder, bipolar type (HCC)  Active Problems:    COPD with asthma [facility])    Acquired hypothyroidism    Gastroesophageal reflux disease without esophagitis      Recommended Treatment: Continue with pharmacotherapy, group therapy, milieu therapy and occupational therapy  The patient will be maintained on the following medications:    Current Facility-Administered Medications:  acetaminophen 650 mg Oral Q6H PRN Reji Duque MD   albuterol 2 puff Inhalation Q4H PRN ABILIO Field   aluminum-magnesium hydroxide-simethicone 15 mL Oral Q4H PRN Jarrett Espinoza MD   ammonium lactate  Topical BID PRN ABILIO Field   benzonatate 100 mg Oral TID PRN Reji Duque MD   benztropine 1 mg Intramuscular Q8H PRN Jarrett Espinoza MD   benztropine 1 mg Oral Q8H PRN Jarrett Espinoza MD   enoxaparin 40 mg Subcutaneous Daily Reji Duque MD   fluticasone-vilanterol 1 puff Inhalation Daily Garland Buck MD   haloperidol 1 mg Oral Q6H PRN Lynsey Duran MD   haloperidol lactate 2 mg Intramuscular Q6H PRN Lynsey Duran MD   hydrOXYzine HCL 25 mg Oral Q6H PRN Jarrett Espinoza MD   levothyroxine 125 mcg Oral Early Morning Reji Duque MD   magnesium hydroxide 30 mL Oral Daily PRN Galilea Hsu MD   paliperidone 12 mg Oral Daily ABILIO Field   Or       OLANZapine 10 mg Intramuscular Daily ABILIO Field   pantoprazole 40 mg Oral Early Morning Reji Duque MD   polyethylene glycol 17 g Oral Daily PRN ABILIO Field   QUEtiapine 100 mg Oral HS ABILIO Field   risperiDONE 1 mg Oral Q8H PRN Jarrett Espinoza MD   theophylline 200 mg Oral Daily Reji Duque MD   traZODone 25 mg Oral HS PRN Reji Duque MD   tuberculin 5 Units Intradermal Once Lynsey Duran MD       Risks, benefits and possible side effects of Medications:   Risks, benefits, and possible side effects of medications explained to patient and patient verbalizes understanding

## 2019-06-20 NOTE — NURSING NOTE
Patient has been visible out of bed for short periods, pleasant on approach but preoccupied and needy calling out often  Somatic at time, respiratory therapist Riddhi Mortensen spoke with patient this shift  Labs, orders and vital signs have been reviewed  On routine checks, will continue to monitor

## 2019-06-20 NOTE — PROGRESS NOTES
Appears to be resting calmly in bed , eyes closed , respirations even on O2 at 2 L  Monitored on safety checks

## 2019-06-20 NOTE — PROGRESS NOTES
Patient is alert and oriented x4  Is pleasant and cooperative with approach  Is calm however complaining about episode during meal time when she states she wasn't able to [facility] her up in the wall  This time patient denies anxiety  Denies SI/HI or hallucinations  Is medication and meals compliant this shift  Encouraged for socialization for a better mental health recovery  Will continue to monitor for support

## 2019-06-20 NOTE — PROGRESS NOTES
Awake and visible during breakfast,then isolates to her room  Med compliant,exept Lovenox that she continues to refused despite education provided  No physical complaints so far  Declined groups despite encouragement  No distress noted  Will continue to monitor closely

## 2019-06-20 NOTE — PLAN OF CARE
Problem: Alteration in Thoughts and Perception  Goal: Treatment Goal: Gain control of psychotic behaviors/thinking, reduce/eliminate presenting symptoms and demonstrate improved reality functioning upon discharge  Outcome: Progressing  Goal: Verbalize thoughts and feelings  Description  Interventions:  - Promote a nonjudgmental and trusting relationship with the patient through active listening and therapeutic communication  - Assess patient's level of functioning, behavior and potential for risk  - Engage patient in 1 on 1 interactions for a minimum of 15 minutes each session  - Encourage patient to express fears, feelings, frustrations, and discuss symptoms    - [place] patient to reality, help patient recognize reality-based thinking   - Administer medications as ordered and assess for potential side effects  - Provide the patient education related to the signs and symptoms of the illness and desired effects of prescribed medications  Outcome: Progressing  Goal: Refrain from acting on delusional thinking/internal stimuli  Description  Interventions:  - Monitor patient closely, per order   - Utilize least restrictive measures   - Set reasonable limits, give positive feedback for acceptable   - Administer medications as ordered and monitor of potential side effects  Outcome: Progressing  Goal: Agree to be compliant with medication regime, as prescribed and report medication side effects  Description  Interventions:  - Offer appropriate PRN medication and supervise ingestion; conduct aims, as needed   Outcome: Progressing  Goal: Attend and participate in unit activities, including therapeutic, recreational, and educational groups  Description  Interventions:  - Provide therapeutic and educational activities daily, encourage attendance and participation, and document same in the medical record   Outcome: Progressing  Goal: Recognize dysfunctional thoughts, communicate reality-based thoughts at the time of discharge  Description  Interventions:  - Provide medication and psycho-education to assist patient in compliance and developing insight into his/her illness   Outcome: Progressing  Goal: Complete daily ADLs, including personal hygiene independently, as able  Description  Interventions:  - Observe, teach, and assist patient with ADLS  - Monitor and promote a balance of rest/activity, with adequate nutrition and elimination   Outcome: Progressing     Problem: Ineffective Coping  Goal: Identifies ineffective coping skills  Outcome: Progressing  Goal: Identifies healthy coping skills  Outcome: Progressing  Goal: Demonstrates healthy coping skills  Outcome: Progressing  Goal: Participates in unit activities  Description  Interventions:  - Provide therapeutic environment   - Provide required programming   - Redirect inappropriate behaviors   Outcome: Progressing  Goal: Patient/Family participate in treatment and DC plans  Description  Interventions:  - Provide therapeutic environment  Outcome: Progressing  Goal: Patient/Family verbalizes awareness of resources  Outcome: Progressing  Goal: Understands least restrictive measures  Description  Interventions:  - Utilize least restrictive behavior  Outcome: Progressing  Goal: Free from restraint events  Description  - Utilize least restrictive measures   - Provide behavioral interventions   - Redirect inappropriate behaviors   Outcome: Progressing     Problem: Risk for Self Injury/Neglect  Goal: Treatment Goal: Remain safe during length of stay, learn and adopt new coping skills, and be free of self-injurious ideation, impulses and acts at the time of discharge  Outcome: Progressing  Goal: Verbalize thoughts and feelings  Description  Interventions:  - Assess and re-assess patient's lethality and potential for self-injury  - Engage patient in 1:1 interactions, daily, for a minimum of 15 minutes  - Encourage patient to express feelings, fears, frustrations, hopes  - Establish rapport/trust with patient   Outcome: Progressing  Goal: Refrain from harming self  Description  Interventions:  - Monitor patient closely, per order  - Develop a trusting relationship  - Supervise medication ingestion, monitor effects and side effects   Outcome: Progressing  Goal: Attend and participate in unit activities, including therapeutic, recreational, and educational groups  Description  Interventions:  - Provide therapeutic and educational activities daily, encourage attendance and participation, and document same in the medical record  - Obtain collateral information, encourage visitation and family involvement in care   Outcome: Progressing  Goal: Recognize maladaptive responses and adopt new coping mechanisms  Outcome: Progressing  Goal: Complete daily ADLs, including personal hygiene independently, as able  Description  Interventions:  - Observe, teach, and assist patient with ADLS  - Monitor and promote a balance of rest/activity, with adequate nutrition and elimination  Outcome: Progressing     Problem: Depression  Goal: Treatment Goal: Demonstrate behavioral control of depressive symptoms, verbalize feelings of improved mood/affect, and adopt new coping skills prior to discharge  Outcome: Progressing  Goal: Verbalize thoughts and feelings  Description  Interventions:  - Assess and re-assess patient's level of risk   - Engage patient in 1:1 interactions, daily, for a minimum of 15 minutes   - Encourage patient to express feelings, fears, frustrations, hopes   Outcome: Progressing  Goal: Refrain from harming self  Description  Interventions:  - Monitor patient closely, per order   - Supervise medication ingestion, monitor effects and side effects   Outcome: Progressing  Goal: Refrain from isolation  Description  Interventions:  - Develop a trusting relationship   - Encourage socialization   Outcome: Progressing  Goal: Refrain from self-neglect  Outcome: Progressing  Goal: Attend and participate in unit activities, including therapeutic, recreational, and educational groups  Description  Interventions:  - Provide therapeutic and educational activities daily, encourage attendance and participation, and document same in the medical record   Outcome: Progressing  Goal: Complete daily ADLs, including personal hygiene independently, as able  Description  Interventions:  - Observe, teach, and assist patient with ADLS  -  Monitor and promote a balance of rest/activity, with adequate nutrition and elimination   Outcome: Progressing     Problem: Anxiety  Goal: Anxiety is at manageable level  Description  Interventions:  - Assess and monitor patient's anxiety level  - Monitor for signs and symptoms of anxiety both physical and emotional (heart palpitations, chest pain, shortness of breath, headaches, nausea, feeling jumpy, restlessness, irritable, apprehensive)  - Collaborate with interdisciplinary team and initiate plan and interventions as ordered    - [place] patient to unit/surroundings  - Explain treatment plan  - Encourage participation in care  - Encourage verbalization of concerns/fears  - Identify coping mechanisms  - Assist in developing anxiety-reducing skills  - Administer/offer alternative therapies  - Limit or eliminate stimulants  Outcome: Progressing     Problem: DISCHARGE PLANNING - CARE MANAGEMENT  Goal: Discharge to post-acute care or home with appropriate resources  Description  INTERVENTIONS:  - Conduct assessment to determine patient/family and health care team treatment goals, and need for post-acute services based on payer coverage, community resources, and patient preferences, and barriers to discharge  - Address psychosocial, clinical, and financial barriers to discharge as identified in assessment in conjunction with the patient/family and health care team  - Arrange appropriate level of post-acute services according to patients   needs and preference and payer coverage in collaboration with the physician and health care team  - Communicate with and update the patient/family, physician, and health care team regarding progress on the discharge plan  - Arrange appropriate transportation to post-acute venues  Outcome: Progressing     Problem: Prexisting or High Potential for Compromised Skin Integrity  Goal: Skin integrity is maintained or improved  Description  INTERVENTIONS:  - Identify patients at risk for skin breakdown  - Assess and monitor skin integrity  - Assess and monitor nutrition and hydration status  - Monitor labs (i e  albumin)  - Assess for incontinence   - Turn and reposition patient  - Assist with mobility/ambulation  - Relieve pressure over bony prominences  - Avoid friction and shearing  - Provide appropriate hygiene as needed including keeping skin clean and dry  - Evaluate need for skin moisturizer/barrier cream  - Collaborate with interdisciplinary team (i e  Nutrition, Rehabilitation, etc )   - Patient/family teaching  Outcome: Progressing

## 2019-06-21 PROCEDURE — 97530 THERAPEUTIC ACTIVITIES: CPT

## 2019-06-21 PROCEDURE — 99232 SBSQ HOSP IP/OBS MODERATE 35: CPT | Performed by: NURSE PRACTITIONER

## 2019-06-21 RX ADMIN — LEVOTHYROXINE SODIUM 125 MCG: 125 TABLET ORAL at 05:50

## 2019-06-21 RX ADMIN — QUETIAPINE FUMARATE 50 MG: 50 TABLET, EXTENDED RELEASE ORAL at 21:28

## 2019-06-21 RX ADMIN — PALIPERIDONE 12 MG: 6 TABLET, EXTENDED RELEASE ORAL at 08:19

## 2019-06-21 RX ADMIN — PANTOPRAZOLE SODIUM 40 MG: 40 TABLET, DELAYED RELEASE ORAL at 05:50

## 2019-06-21 RX ADMIN — THEOPHYLLINE ANHYDROUS 200 MG: 200 CAPSULE, EXTENDED RELEASE ORAL at 08:19

## 2019-06-21 RX ADMIN — FLUTICASONE FUROATE AND VILANTEROL TRIFENATATE 1 PUFF: 200; 25 POWDER RESPIRATORY (INHALATION) at 08:20

## 2019-06-21 NOTE — PLAN OF CARE
Problem: OCCUPATIONAL THERAPY ADULT  Goal: Performs self-care activities at highest level of function for planned discharge setting  See evaluation for individualized goals  Description  Treatment Interventions: ADL retraining, Endurance training, Continued evaluation, Activityengagement(coping skills, reality focus, life management)          See flowsheet documentation for full assessment, interventions and recommendations  Outcome: Progressing  Note:   Limitation: Decreased ADL status, Decreased high-level ADLs, Mood limitation(guarded, limited coping and life management skills)  Prognosis: Fair  Assessment: Osvaldo Beaver was sitting on her bed in her room when approached for OT Activity involvement  She initially appeared to be in good spirits, but when asked how she was doing, she appeared more upset  She shared that she was concerned about all the side effects she was having from her medications, that she was feeling tired  She added, "I'm heart-broken that I'm still here " She also stated that she was unhappy with her medication situation  She talked about discharge status, stating that she did not know where she would end up going  However, she did wish to attend to cognitive activity, [employer] game, and she did remember some of the concepts we had reviewed days earlier, but at other times she was forgetful even within a minute or 2 (she attributed this to being upset because she was not thinking about her personal situation)  She did attend to the game at hand, however, and this did appear to distract her from her concerns for the time being  The session was ended earlier when lunch had arrived to the unit  (she does leave her room for meals)  Her engagement in the program was commended  Continue to promote positive focus, explore realistic discharge alternative as Osvaldo Beaver is willing to consider this

## 2019-06-21 NOTE — OCCUPATIONAL THERAPY NOTE
Occupational Therapy Activity Treatment Note      [place]    6/21/2019    Patient Active Problem List   Diagnosis    Sepsis ([facility] )    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    Tobacco use disorder, continuous    Bipolar disorder ([facility] )    Left hip pain    Lactic acidosis    Hypokalemia    Hypomagnesemia    Compression fracture of L4 lumbar vertebra    Thoracic compression fracture (HCC)    Ventral hernia    Parapneumonic effusion    Acute on chronic respiratory failure with hypoxia (HCC)    Chronic respiratory failure (HCC)    Hypophosphatemia    Elevated MCV    Compression deformity of vertebra    Schizoaffective disorder, bipolar type ([facility] )    Acquired hypothyroidism    Gastroesophageal reflux disease without esophagitis    Abnormal CT of the chest    Excessive cerumen in left ear canal    Lipoma of right upper extremity    Polydipsia    Localized swelling of both lower legs       Past Medical History:   Diagnosis Date    Acid reflux     Anxiety     Asthma     Bipolar 1 disorder (HCC)     Chronic pain disorder     Chronic respiratory failure (HCC)     Compression fracture of fourth lumbar vertebra (HCC)     COPD (chronic obstructive pulmonary disease) (HCC)     Depression     GERD (gastroesophageal reflux disease)     History of home oxygen therapy     Hypothyroidism     Lipoma of upper extremity     Psychiatric illness     [facility]ffective disorder ([facility] )     Substance abuse ([facility] )     Nicotine    Thoracic compression fracture ([facility] )     Ventral hernia        No past surgical history on file      06/21/19 1142   Assessment   Assessment Jalen Martinez was sitting on her bed in her room when approached for OT Activity involvement  She initially appeared to be in good spirits, but when asked how she was doing, she appeared more upset  She shared that she was concerned about all the side effects she was having from her medications, that she was feeling tired   She added, "I'm heart-broken that I'm still here " She also stated that she was unhappy with her medication situation  She talked about discharge status, stating that she did not know where she would end up going  However, she did wish to attend to cognitive activity, [employer] game, and she did remember some of the concepts we had reviewed days earlier, but at other times she was forgetful even within a minute or 2 (she attributed this to being upset because she was not thinking about her personal situation)  She did attend to the game at hand, however, and this did appear to distract her from her concerns for the time being  The session was ended earlier when lunch had arrived to the unit  (she does leave her room for meals)  Her engagement in the program was commended  Continue to promote positive focus, explore realistic discharge alternative as Christen Dodd is willing to consider this  Plan   Treatment Interventions ADL retraining; Endurance training;Continued evaluation; Activityengagement  (coping skills, reality focus, life management)   Goal Expiration Date 07/14/19   Treatment Day 8   OT Frequency 2-3x/wk   Xi Mendiolar, OT

## 2019-06-21 NOTE — PROGRESS NOTES
06/21/19 0952   Team Meeting   Meeting Type Daily Rounds   Next Conference Date 06/24/19   Team Members Present   Team Members Present Physician;Nurse;;Occupational Therapist;Other (Discipline and Name)   Physician Team Member [id number] Team Member Zenia, [facility] Management Team Member Pedro Palafox    Team Member Jas Denney   Other (Discipline and Name) Pharmacist- Parag Sharp   Patient/Family Present   Patient Present No   Patient's Family Present No   Pt histrionic yesterday    Labile, irritable at times  Isolative to her room without much encouragement  Taking meds  3 [facility] EAC to assess today at 1pm

## 2019-06-21 NOTE — PROGRESS NOTES
Progress Note - Behavioral Health   Uziel Mckay 60 y o  female MRN: [id number]  Unit/Bed#: Linda Gilliland 903-20 Encounter: [id number]    The patient was seen for continuing care and reviewed with treatment team  Staff reports that the patient is self isolating, but remains calm and cooperative  Attending groups and remains medication compliant  During assessment patient remains focused on medication administration and continues to be somatic about side effects  Patient continues to remain paranoid and suspicious  Continues to express thoughts about "[place] watching over her and feeling sad about her situation " Patient remains hypersexual relating her feelings towards her medications  Requires additional support and encouragement to perform ADLs and come out of bed for meals  Denies any thoughts to hurt herself or others  Denies any visual or auditory hallucinations  Reports that she is eating and sleeping well  No current distress is noted  Continues to be staff splitting regarding treatment plan       Mental Status Evaluation:  Appearance:  Adequate hygiene and grooming   Behavior:  cooperative and Superficial   Fund of knowledge  Diminished   Speech:   Language: Rambling and tangential  No overt abnormality   Mood:  stable   Affect:   Associations: blunted  Intact   Thought Process:  Circumstantial   Thought Content:  Paranoid and mistrustful, Delusions of persecution, Grandiose delusions, Obsessive ruminations and is somatically preoccupied   Perceptual Disturbances: Denies hallucinations and does not appear to be responding to internal stimuli   Risk Potential: No suicidal or homicidal ideation   Orientation  Oriented x 3   Memory No deficits   Attention/Concentration attention span appeared shorter than expected for age   Insight:  No insight   Judgment: Poor judgment   Gait/Station: normal gait/station and normal balance   Motor Activity: No abnormal movement noted     Progress Toward Goals: unchanged    Assessment/Plan    Principal Problem:    Schizoaffective disorder, bipolar type ([facility] )  Active Problems:    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[facility] )    Acquired hypothyroidism    Gastroesophageal reflux disease without esophagitis      Recommended Treatment: Continue with pharmacotherapy, group therapy, milieu therapy and occupational therapy    The patient will be maintained on the following medications:    Current Facility-Administered Medications:  acetaminophen 650 mg Oral Q6H PRN Elli Sharpe MD   albuterol 2 puff Inhalation Q4H PRN Maria Luisa Shade, CRNP   aluminum-magnesium hydroxide-simethicone 15 mL Oral Q4H PRN Elio Summers MD   ammonium lactate  Topical BID PRN Maria Luisa Shade, CRNP   benzonatate 100 mg Oral TID PRN Elli Sharpe MD   benztropine 1 mg Intramuscular Q8H PRN Elio Summers MD   benztropine 1 mg Oral Q8H PRN Elio Summers MD   enoxaparin 40 mg Subcutaneous Daily Elli Sharpe MD   fluticasone-vilanterol 1 puff Inhalation Daily Gail Urias MD   haloperidol 1 mg Oral Q6H PRN Nelida Vilchis MD   haloperidol lactate 2 mg Intramuscular Q6H PRN Nelida Vilchis MD   hydrOXYzine HCL 25 mg Oral Q6H PRN Elio Summers MD   levothyroxine 125 mcg Oral Early Morning Elli Sharpe MD   magnesium hydroxide 30 mL Oral Daily PRN Rachelle Greenberg MD   paliperidone 12 mg Oral Daily Maria Luisa Shade, CRNP   Or       OLANZapine 10 mg Intramuscular Daily Maria Luisa Shade, CRNP   pantoprazole 40 mg Oral Early Morning Elli Sharpe MD   polyethylene glycol 17 g Oral Daily PRN Maria Luisa Shade, CRNP   QUEtiapine 100 mg Oral HS Maria Luisa Shade, CRNP   risperiDONE 1 mg Oral Q8H PRN Elio Summers MD   theophylline 200 mg Oral Daily Elli Sharpe MD   traZODone 25 mg Oral HS PRN Elli Sharpe MD   tuberculin 5 Units Intradermal Once Nelida Vilchis MD       Risks, benefits and possible side effects of Medications:   Risks, benefits, and possible side effects of medications explained to patient and patient verbalizes understanding

## 2019-06-21 NOTE — PROGRESS NOTES
Pt out of room for breakfast   Took her AM meds except for the Lovenox which she refused, despite receiving education on the consequences of refusal   Pt is cooperative with irritable edge  No complaints given at this time

## 2019-06-21 NOTE — PROGRESS NOTES
Currently in bed with O2 at 2 L via nasal cannula  Appears to be resting calmly, eyes closed in no distress  Monitored on safety checks

## 2019-06-21 NOTE — CASE MANAGEMENT
Writer met with patient 1:1 to follow up following meeting with Kiet Dillon from [facility]  Patient states she is willing to try [facility] however she is afraid that they will not take her due to her oxygen  Writer explained the process it takes to go down to [facility] and if they accommodate her needs to go down, they will  Patient stated, " I think the [facility] will be good for me to have a new doctor to change my medications and let me choose the ones I want to be on " Patient requesting to go down and tour the [facility] and see if before she agrees to go  Writer reminder the patient of her commitment status and that if writer is able to arrange the need to see [facility] she will  Patient continued to be somatic regarding her oxygen during conversation  Writer pointed out the nice flowers and items her sister brought her, however patient quickly detoured conversation stating that her sister is "no good"  Writer asked for reasoning as to why this is and patient has no logical response  Patient asked writer if her brother could visit her in her room and writer explained visiting guidelines for the unit  Patient became upset and irritable with writer stating, "You people give me no privacy and I want to see my brother alone not with a bunch of these people here " Writer stated that she could take this up with Chris Nails when she returns to work on Monday  Patient was satisfied with this answer  CM will continue to follow and provide services as needed

## 2019-06-22 PROCEDURE — 99232 SBSQ HOSP IP/OBS MODERATE 35: CPT | Performed by: PSYCHIATRY & NEUROLOGY

## 2019-06-22 RX ADMIN — THEOPHYLLINE ANHYDROUS 200 MG: 200 CAPSULE, EXTENDED RELEASE ORAL at 08:54

## 2019-06-22 RX ADMIN — FLUTICASONE FUROATE AND VILANTEROL TRIFENATATE 1 PUFF: 200; 25 POWDER RESPIRATORY (INHALATION) at 08:55

## 2019-06-22 RX ADMIN — QUETIAPINE FUMARATE 50 MG: 50 TABLET, EXTENDED RELEASE ORAL at 21:46

## 2019-06-22 RX ADMIN — PALIPERIDONE 12 MG: 6 TABLET, EXTENDED RELEASE ORAL at 08:54

## 2019-06-22 RX ADMIN — LEVOTHYROXINE SODIUM 125 MCG: 125 TABLET ORAL at 05:44

## 2019-06-22 RX ADMIN — PANTOPRAZOLE SODIUM 40 MG: 40 TABLET, DELAYED RELEASE ORAL at 05:44

## 2019-06-22 NOTE — PROGRESS NOTES
Patient was visible on the unit  She had a visitor  Patient denied any needs  Complaint with medications  Patient continues on oxygen  Vitals stable  Will continue on q 7 minute checks

## 2019-06-22 NOTE — PROGRESS NOTES
Pt isolative to self in room, on 2L nasal cannula  Med compliant except for Lovenox  Denies pain  Cooperative with peers and staff  Will continue to encourage socialization with peers for a healthy recovery

## 2019-06-23 PROCEDURE — 99232 SBSQ HOSP IP/OBS MODERATE 35: CPT | Performed by: PSYCHIATRY & NEUROLOGY

## 2019-06-23 RX ADMIN — THEOPHYLLINE ANHYDROUS 200 MG: 200 CAPSULE, EXTENDED RELEASE ORAL at 08:26

## 2019-06-23 RX ADMIN — FLUTICASONE FUROATE AND VILANTEROL TRIFENATATE 1 PUFF: 200; 25 POWDER RESPIRATORY (INHALATION) at 08:28

## 2019-06-23 RX ADMIN — QUETIAPINE FUMARATE 50 MG: 50 TABLET, EXTENDED RELEASE ORAL at 21:55

## 2019-06-23 RX ADMIN — PALIPERIDONE 12 MG: 6 TABLET, EXTENDED RELEASE ORAL at 08:26

## 2019-06-23 RX ADMIN — PANTOPRAZOLE SODIUM 40 MG: 40 TABLET, DELAYED RELEASE ORAL at 05:35

## 2019-06-23 RX ADMIN — LEVOTHYROXINE SODIUM 125 MCG: 125 TABLET ORAL at 05:35

## 2019-06-23 NOTE — PROGRESS NOTES
Progress Note - [id number] Enid Andrade [place] 60 y o  female MRN: [id number]   Unit/Bed#: Jose Daniel Mail 511-08 Encounter: [id number]    Behavior over the last 24 hours: unchanged  Johanna Morgan was seen and evaluated on the unit  Per staff she continues to be somatically preoccupied and states seclusive to room due to being on oxygen  [facility] but has difficulty describing what was horrible about it  She also reports that she feels like she can't eat and is feeling restless  However unclear whether the patient is resisting medication as has been the issues during this hospitalization  Patient continues to be on a 304 and is waiting placement at [facility]  Stays in the room during the day  Limited participation in milieu  Socializes with peers      Sleep: normal  Appetite: normal  Medication side effects: No   ROS: no complaints    Mental Status Evaluation:    Appearance:  age appropriate, casually dressed   Behavior:  cooperative, Superficial   Speech:  increased rate   Mood:  euthymic   Affect:  labile, overbright, increased in intensity   Thought Process:  disorganized, illogical   Associations: flight of ideas, perseverative   Thought Content:  paranoid ideation, Somatically preoccupied   Perceptual Disturbances: no auditory hallucinations, no visual hallucinations   Risk Potential: Suicidal ideation - None  Homicidal ideation - None  Potential for aggression - No   Sensorium:  oriented to person, place and time/date   Memory:  recent and remote memory grossly intact   Consciousness:  alert and awake   Attention: attention span and concentration appear shorter than expected for age   Insight:  poor   Judgment: poor   Gait/Station: normal gait/station, normal balance   Motor Activity: no abnormal movements     Vital signs in last 24 hours:    Temp:  [97 7 °F (36 5 °C)-98 8 °F (37 1 °C)] 98 8 °F (37 1 °C)  HR:  [77-93] 77  Resp:  [16-18] 16  BP: (102-119)/(50-66) 102/52    Laboratory results: I have personally reviewed all pertinent laboratory/tests results  Progress Toward Goals: insight remains poor    Assessment/Plan   Principal Problem:    Schizoaffective disorder, bipolar type ([facility])  Active Problems:    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[facility] )    Acquired hypothyroidism    Gastroesophageal reflux disease without esophagitis    Recommended Treatment:     Planned medication and treatment changes: All current active medications have been reviewed    Encourage group therapy, milieu therapy and occupational therapy  [address] checks every 7 minutes  Continue current psychiatric medications as seen below    Current Facility-Administered Medications:  acetaminophen 650 mg Oral Q6H PRN James Stewart MD   albuterol 2 puff Inhalation Q4H PRN Deanna Comfort, CRNP   aluminum-magnesium hydroxide-simethicone 15 mL Oral Q4H PRN Tito Gibbs MD   ammonium lactate  Topical BID PRN Deanna Comfort, CRNP   benzonatate 100 mg Oral TID PRN James Stewart MD   benztropine 1 mg Intramuscular Q8H PRN Tito Gibbs MD   benztropine 1 mg Oral Q8H PRN Tito Gibbs MD   enoxaparin 40 mg Subcutaneous Daily James Stewart MD   fluticasone-vilanterol 1 puff Inhalation Daily Carolin Whiteside MD   haloperidol 1 mg Oral Q6H PRN Neal Hobson MD   haloperidol lactate 2 mg Intramuscular Q6H PRN Neal Hobson MD   hydrOXYzine HCL 25 mg Oral Q6H PRN Tito Gibbs MD   levothyroxine 125 mcg Oral Early Morning James Stewart MD   magnesium hydroxide 30 mL Oral Daily PRN Nimo Hanna MD   paliperidone 12 mg Oral Daily Deanna Comfort, CRNP   Or       OLANZapine 10 mg Intramuscular Daily Deanna Comfort, CRNP   pantoprazole 40 mg Oral Early Morning James Stewart MD   polyethylene glycol 17 g Oral Daily PRN Deanna Comfort, CRNP   QUEtiapine 100 mg Oral HS Deanna Comfort, CRNP   risperiDONE 1 mg Oral Q8H PRN Tito Gibbs MD   theophylline 200 mg Oral Daily James Stewart MD   traZODone 25 mg Oral HS LAINEY Baker MD   tuberculin 5 Units Intradermal Once Christin Toney MD       Risks / Benefits of Treatment:    Risks, benefits, and possible side effects of medications explained to patient and patient verbalizes understanding and agreement for treatment  Counseling / Coordination of Care:    Patient's progress reviewed with nursing staff  Medications, treatment progress and treatment plan reviewed with patient      Birgit Upton MD 06/22/19

## 2019-06-23 NOTE — PLAN OF CARE
Problem: Alteration in Thoughts and Perception  Goal: Verbalize thoughts and feelings  Description  Interventions:  - Promote a nonjudgmental and trusting relationship with the patient through active listening and therapeutic communication  - Assess patient's level of functioning, behavior and potential for risk  - Engage patient in 1 on 1 interactions for a minimum of 15 minutes each session  - Encourage patient to express fears, feelings, frustrations, and discuss symptoms    - [place] patient to reality, help patient recognize reality-based thinking   - Administer medications as ordered and assess for potential side effects  - Provide the patient education related to the signs and symptoms of the illness and desired effects of prescribed medications  Outcome: Progressing     Problem: Risk for Self Injury/Neglect  Goal: Refrain from harming self  Description  Interventions:  - Monitor patient closely, per order  - Develop a trusting relationship  - Supervise medication ingestion, monitor effects and side effects   Outcome: Progressing     Problem: Anxiety  Goal: Anxiety is at manageable level  Description  Interventions:  - Assess and monitor patient's anxiety level  - Monitor for signs and symptoms of anxiety both physical and emotional (heart palpitations, chest pain, shortness of breath, headaches, nausea, feeling jumpy, restlessness, irritable, apprehensive)  - Collaborate with interdisciplinary team and initiate plan and interventions as ordered    - [place] patient to unit/surroundings  - Explain treatment plan  - Encourage participation in care  - Encourage verbalization of concerns/fears  - Identify coping mechanisms  - Assist in developing anxiety-reducing skills  - Administer/offer alternative therapies  - Limit or eliminate stimulants  Outcome: Progressing

## 2019-06-23 NOTE — PROGRESS NOTES
Patient remains isolative to room  Continues on oxygen  No distress noted  Patient took only 50mg Seroquel instead of the 100mg ordered  She states it too strong  Social with room mate  Patient denied any needs  Patient currently in her bed  Appears calm and relaxed  Will continue to monitor on q 7 minute checks

## 2019-06-24 PROCEDURE — 99232 SBSQ HOSP IP/OBS MODERATE 35: CPT | Performed by: NURSE PRACTITIONER

## 2019-06-24 RX ORDER — QUETIAPINE FUMARATE 50 MG/1
100 TABLET, EXTENDED RELEASE ORAL
Status: DISCONTINUED | OUTPATIENT
Start: 2019-06-24 | End: 2019-07-02

## 2019-06-24 RX ORDER — OLANZAPINE 10 MG/1
10 INJECTION, POWDER, LYOPHILIZED, FOR SOLUTION INTRAMUSCULAR
Status: DISCONTINUED | OUTPATIENT
Start: 2019-06-24 | End: 2019-07-02

## 2019-06-24 RX ADMIN — PANTOPRAZOLE SODIUM 40 MG: 40 TABLET, DELAYED RELEASE ORAL at 06:00

## 2019-06-24 RX ADMIN — THEOPHYLLINE ANHYDROUS 200 MG: 200 CAPSULE, EXTENDED RELEASE ORAL at 08:31

## 2019-06-24 RX ADMIN — FLUTICASONE FUROATE AND VILANTEROL TRIFENATATE 1 PUFF: 200; 25 POWDER RESPIRATORY (INHALATION) at 08:31

## 2019-06-24 RX ADMIN — PALIPERIDONE 12 MG: 6 TABLET, EXTENDED RELEASE ORAL at 08:31

## 2019-06-24 RX ADMIN — LEVOTHYROXINE SODIUM 125 MCG: 125 TABLET ORAL at 06:00

## 2019-06-24 RX ADMIN — QUETIAPINE FUMARATE 100 MG: 50 TABLET, EXTENDED RELEASE ORAL at 21:12

## 2019-06-24 NOTE — CASE MANAGEMENT
Patient continues to be paranoid, somatic, and yelling out at times  Writer called and spoke with [facility] staff who stated they will bring in patient's  for her portable machine  CM will continue to follow and provide services as needed

## 2019-06-24 NOTE — PROGRESS NOTES
If not marilin go to ER   Patient isolative to her room  She is social with her room mate  Patient denied any needs this shift  Patient continues to only take 50mg of her Seroquel qhs  Patient is currently in her bed  No signs of distress noted  Will continue to monitor on q 7 minute checks

## 2019-06-24 NOTE — PROGRESS NOTES
Pt accepted all meds except lovenox  Brightens with interaction  Appetite fair  VSS  Denied Si  Refused group  Monitored for safety and support

## 2019-06-24 NOTE — PROGRESS NOTES
Progress Note - [address] Enid Andrade [place] 60 y o  female MRN: [id number]   Unit/Bed#: Evelyne Black 828-31 Encounter: [id number]    Behavior over the last 24 hours: abdulkadir [place]   seen and evaluated in her room on the unit  She reports today that she has some hopes as she states that she called her brother to see if she can move back in with him and her parents after she is discharged  She also reports that her brother is visiting today  She continues to exhibit pressured speech and flight of ideas  She reports she slept okay  She still insists that the Seroquel as too high but cannot exactly describe what the issue with the increased Seroquel is  She reports she has no problems with 50 mg of Seroquel  Seclusive to the room      Sleep: improved  Appetite: normal  Medication side effects: No   ROS: no complaints    Mental Status Evaluation:    Appearance:  age appropriate, casually dressed, dressed appropriately   Behavior:  pleasant, cooperative   Speech:  increased rate, pressured   Mood:  euphoric   Affect:  overbright   Thought Process:  illogical, flight of ideas   Associations: flight of ideas   Thought Content:  grandiose and somatic delusions   Perceptual Disturbances: no auditory hallucinations, no visual hallucinations   Risk Potential: Suicidal ideation - None  Homicidal ideation - None  Potential for aggression - No   Sensorium:  oriented to person, place and time/date   Memory:  recent and remote memory grossly intact   Consciousness:  alert and awake   Attention: attention span and concentration appear shorter than expected for age   Insight:  poor   Judgment: poor   Gait/Station: normal gait/station, normal balance   Motor Activity: no abnormal movements     Vital signs in last 24 hours:    Temp:  [96 7 °F (35 9 °C)-98 6 °F (37 °C)] 98 6 °F (37 °C)  HR:  [68-94] 75  Resp:  [16-18] 18  BP: (101-114)/(56-71) 106/56    Laboratory results: I have personally reviewed all pertinent laboratory/tests results  Progress Toward Goals: insight remains poor    Assessment/Plan   Principal Problem:    Schizoaffective disorder, bipolar type ([facility])  Active Problems:    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[address] )    Acquired hypothyroidism    Gastroesophageal reflux disease without esophagitis    Recommended Treatment:     Planned medication and treatment changes: All current active medications have been reviewed    Encourage group therapy, milieu therapy and occupational therapy  [address] checks every 7 minutes  Continue current psychiatric medications as listed below    Current Facility-Administered Medications:  acetaminophen 650 mg Oral Q6H PRN Lynne Donohue MD   albuterol 2 puff Inhalation Q4H PRN Angel Colla, CRNP   aluminum-magnesium hydroxide-simethicone 15 mL Oral Q4H PRN Mikal Dang MD   ammonium lactate  Topical BID PRN Angel Colla, CRNP   benzonatate 100 mg Oral TID PRN Lynne Donohue MD   benztropine 1 mg Intramuscular Q8H PRN Mikal Dang MD   benztropine 1 mg Oral Q8H PRN Mikal Dang MD   enoxaparin 40 mg Subcutaneous Daily Lynne Donohue MD   fluticasone-vilanterol 1 puff Inhalation Daily Belinda Navarro MD   haloperidol 1 mg Oral Q6H PRN Jenniffer Pimentel MD   haloperidol lactate 2 mg Intramuscular Q6H PRN Jenniffer Pimentel MD   hydrOXYzine HCL 25 mg Oral Q6H PRN Mikal Dang MD   levothyroxine 125 mcg Oral Early Morning Lynne Donohue MD   magnesium hydroxide 30 mL Oral Daily PRN Chester Chavis MD   paliperidone 12 mg Oral Daily Angel Colla, CRNP   Or       OLANZapine 10 mg Intramuscular Daily Angel Colla, CRNP   pantoprazole 40 mg Oral Early Morning Lynne Donohue MD   polyethylene glycol 17 g Oral Daily PRN Angel Colla, CRNP   QUEtiapine 100 mg Oral HS Angel Colla, CRNP   risperiDONE 1 mg Oral Q8H PRN Mikal Dang MD   theophylline 200 mg Oral Daily Lynne Donohue MD   traZODone 25 mg Oral HS PRN Lynne Donohue MD   tuberculin 5 Units Intradermal Once Papito Meehan MD       Risks / Benefits of Treatment:    Risks, benefits, and possible side effects of medications explained to patient and patient verbalizes understanding and agreement for treatment  Counseling / Coordination of Care:    Patient's progress reviewed with nursing staff  Medications, treatment progress and treatment plan reviewed with patient      Rowena Chavis MD 06/23/19

## 2019-06-24 NOTE — PROGRESS NOTES
06/24/19 1000   Team Meeting   Meeting Type Daily Rounds   Team Members Present   Team Members Present Physician;Nurse;;Occupational Therapist   Physician Team Member Dr Roman pantoja   Nursing Team Member [id number] Management Team Member Jorge Newsome    OT Team Member Jerome Taylor      Pt discussed on treatment rounds today, no medication changes discussed

## 2019-06-24 NOTE — PROGRESS NOTES
Progress Note - Behavioral Health   Ирина Wood 60 y o  female MRN: [id number]  Unit/Bed#: Reny Tate 882-36 Encounter: [id number]    The patient was seen for continuing care and reviewed with treatment team  Staff reports that the patient remains self isolating to room and continues to display poor self care  Remains selectively medication compliant, agreeing to only half of her medications  Staff reports that the patient remains somatic and paranoid  During assessment the patient remains delusional, paranoid, somatic, and frequently discusses changes to her discharge plan  Patient remains staff splitting and manipulative  Denies any current thoughts to hurt herself or others  Denies any auditory of visual hallucinations  Due to partial dose compliance, will change her Seroquel to an involuntary order and will continue to evaluate symptoms  Denies any adverse side effects to medications and reports that she is eating and sleeping well      Mental Status Evaluation:  Appearance:  Marginal/poor hygiene   Behavior:  cooperative, guarded and Superficial   Fund of knowledge  Diminished   Speech:   Language: Normal rate and Normal volume  No overt abnormality   Mood:  irritable   Affect:   Associations: blunted  Intact   Thought Process:  Circumstantial   Thought Content:  Paranoid and mistrustful, Delusions of persecution, Obsessive ruminations and Somatic delusions   Perceptual Disturbances: Denies hallucinations and does not appear to be responding to internal stimuli   Risk Potential: No suicidal or homicidal ideation   Orientation  Oriented x 3   Memory No deficits   Attention/Concentration attention span appeared shorter than expected for age   Insight:  No insight   Judgment: Poor judgment   Gait/Station: normal gait/station and normal balance   Motor Activity: No abnormal movement noted     Progress Toward Goals: unchanged    Assessment/Plan    Principal Problem:    Schizoaffective disorder, bipolar type [facility])  Active Problems:    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[address] )    Acquired hypothyroidism    Gastroesophageal reflux disease without esophagitis      Recommended Treatment: Continue with pharmacotherapy, group therapy, milieu therapy and occupational therapy  The patient will be maintained on the following medications:    Current Facility-Administered Medications:  acetaminophen 650 mg Oral Q6H PRN Erik Schumacher MD   albuterol 2 puff Inhalation Q4H PRN Lutricia Belling, CRNP   aluminum-magnesium hydroxide-simethicone 15 mL Oral Q4H PRN Dia Ricks MD   ammonium lactate  Topical BID PRN Lutricia Belling, CRNP   benzonatate 100 mg Oral TID PRN Erik Schumacher MD   benztropine 1 mg Intramuscular Q8H PRN Dia Ricks MD   benztropine 1 mg Oral Q8H PRN Dia Ricks MD   enoxaparin 40 mg Subcutaneous Daily Erik Schumacher MD   fluticasone-vilanterol 1 puff Inhalation Daily Itzel Kuhn MD   haloperidol 1 mg Oral Q6H PRN Deb Jansen MD   haloperidol lactate 2 mg Intramuscular Q6H PRN Deb Jansen MD   hydrOXYzine HCL 25 mg Oral Q6H PRN Dia Ricks MD   levothyroxine 125 mcg Oral Early Morning Erik Schumacher MD   magnesium hydroxide 30 mL Oral Daily PRN Shawn Connors MD   paliperidone 12 mg Oral Daily Lutricia Belling, CRNP   Or       OLANZapine 10 mg Intramuscular Daily Lutricia Belling, CRNP   QUEtiapine 100 mg Oral HS Lutricia Belling, CRNP   Or       OLANZapine 10 mg Intramuscular HS Lutricia Belling, CRNP   pantoprazole 40 mg Oral Early Morning Erik Schumacher MD   polyethylene glycol 17 g Oral Daily PRN Lutricia Belling, CRNP   risperiDONE 1 mg Oral Q8H PRN Dia Ricks MD   theophylline 200 mg Oral Daily Erik Schumacher MD   traZODone 25 mg Oral HS PRN Erik Schumacher MD   tuberculin 5 Units Intradermal Once Deb Jansen MD       Risks, benefits and possible side effects of Medications:   Risks, benefits, and possible side effects of medications explained to patient and patient verbalizes understanding

## 2019-06-24 NOTE — PROGRESS NOTES
Patient is resistive to redirection at times (wanting to talk on lengthy conversations at [employer] phone vs going down the [place] to patient phone)  Demeanor is somewhat labile and sarcastic at times but generally less demanding than before  OOB for meal/snack mostly   Denies SI

## 2019-06-25 LAB — GLUCOSE SERPL-MCNC: 84 MG/DL (ref 65–140)

## 2019-06-25 PROCEDURE — 99231 SBSQ HOSP IP/OBS SF/LOW 25: CPT | Performed by: NURSE PRACTITIONER

## 2019-06-25 PROCEDURE — 82948 REAGENT STRIP/BLOOD GLUCOSE: CPT

## 2019-06-25 PROCEDURE — 97530 THERAPEUTIC ACTIVITIES: CPT

## 2019-06-25 RX ADMIN — FLUTICASONE FUROATE AND VILANTEROL TRIFENATATE 1 PUFF: 200; 25 POWDER RESPIRATORY (INHALATION) at 09:56

## 2019-06-25 RX ADMIN — QUETIAPINE FUMARATE 100 MG: 50 TABLET, EXTENDED RELEASE ORAL at 21:46

## 2019-06-25 RX ADMIN — PANTOPRAZOLE SODIUM 40 MG: 40 TABLET, DELAYED RELEASE ORAL at 05:47

## 2019-06-25 RX ADMIN — THEOPHYLLINE ANHYDROUS 200 MG: 200 CAPSULE, EXTENDED RELEASE ORAL at 09:57

## 2019-06-25 RX ADMIN — PALIPERIDONE 12 MG: 6 TABLET, EXTENDED RELEASE ORAL at 09:57

## 2019-06-25 RX ADMIN — LEVOTHYROXINE SODIUM 125 MCG: 125 TABLET ORAL at 05:47

## 2019-06-25 NOTE — PROGRESS NOTES
Progress Note - Behavioral Health   Ta Person 60 y o  female MRN: [id number]  Unit/Bed#: [facility] 843-32 Encounter: [id number]    The patient was seen for continuing care and reviewed with treatment team  Staff reports that the patient remains manipulative, seclusive to room, and somatic regarding health concerns  Visible on the unit  Minimally social with peers, continues to refuse groups, but remains medication compliant with encouragement  During assessment the patient continues to express periods of anxiety and somatic concerns  Remains paranoid and delusional, but appears to be slightly improving with long term medication compliance  Patient continues to remain staff splitting regarding discharge and treatment plan  Patient continues to come out of bed for meals, but reports that she cannot attend groups because "she is agitated and restless " Reports an adequate diet and sleep  Awaiting EAC placement      Mental Status Evaluation:  Appearance:  Marginal/poor hygiene   Behavior:  cooperative and guarded   Fund of knowledge  aware of current events and Aware of past history   Speech:   Language: Normal rate and Normal volume  No overt abnormality   Mood:  irritable and anxious   Affect:   Associations: blunted  Intact   Thought Process:  linear   Thought Content:  Paranoid and mistrustful, Obsessive ruminations and Somatic delusions   Perceptual Disturbances: Denies hallucinations and does not appear to be responding to internal stimuli   Risk Potential: No suicidal or homicidal ideation   Orientation  Oriented x 3   Memory No deficits   Attention/Concentration attention span appeared shorter than expected for age   Insight:  No insight   Judgment: Poor judgment   Gait/Station: normal gait/station and normal balance   Motor Activity: No abnormal movement noted     Progress Toward Goals: improving    Assessment/Plan    Principal Problem:    Schizoaffective disorder, bipolar type ([facility] [address] )  Active Problems: 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[facility] [address] )    Acquired hypothyroidism    Gastroesophageal reflux disease without esophagitis      Recommended Treatment: Continue with pharmacotherapy, group therapy, milieu therapy and occupational therapy  The patient will be maintained on the following medications:    Current Facility-Administered Medications:  acetaminophen 650 mg Oral Q6H PRN Mason Parker MD   albuterol 2 puff Inhalation Q4H PRN Clarene Dada, CRNP   aluminum-magnesium hydroxide-simethicone 15 mL Oral Q4H PRN Zion Farrell MD   ammonium lactate  Topical BID PRN Clarene Dada, CRNP   benzonatate 100 mg Oral TID PRN Mason Parker MD   benztropine 1 mg Intramuscular Q8H PRN Zion Farrell MD   benztropine 1 mg Oral Q8H PRN Zion Farrell MD   enoxaparin 40 mg Subcutaneous Daily Mason Pakrer MD   fluticasone-vilanterol 1 puff Inhalation Daily Rose Francis MD   haloperidol 1 mg Oral Q6H PRN Quincy Doss MD   haloperidol lactate 2 mg Intramuscular Q6H PRN Quincy Doss MD   hydrOXYzine HCL 25 mg Oral Q6H PRN Zion Farrell MD   levothyroxine 125 mcg Oral Early Morning Mason Parker MD   magnesium hydroxide 30 mL Oral Daily PRN Lo Diallo MD   paliperidone 12 mg Oral Daily Clarene Dada, CRNP   Or       OLANZapine 10 mg Intramuscular Daily Clarene Dada, CRNP   QUEtiapine 100 mg Oral HS Clarene Dada, CRNP   Or       OLANZapine 10 mg Intramuscular HS Clarene Dada, CRNP   pantoprazole 40 mg Oral Early Morning Mason Parker MD   polyethylene glycol 17 g Oral Daily PRN Clarene Dada, CRNP   risperiDONE 1 mg Oral Q8H PRN Zion Farrell MD   theophylline 200 mg Oral Daily Mason Parker MD   traZODone 25 mg Oral HS PRN Mason Parker MD   tuberculin 5 Units Intradermal Once Quincy Doss MD       Risks, benefits and possible side effects of Medications:   Risks, benefits, and possible side effects of medications explained to patient and patient verbalizes understanding

## 2019-06-25 NOTE — PROGRESS NOTES
Pt continues to yell out of room at staff that walks by to "find the doctor for her today"  Doctor did see patient and all concerns were discussed previously  Pt remains somatic with health concerns as well  Will continue to monitor

## 2019-06-25 NOTE — PROGRESS NOTES
Monitored on safety checks  Currently in bed with eyes closed and respirations even on 2 l O2 via nasal cannula  Appears to be resting  Not voicing any SI's

## 2019-06-25 NOTE — PROGRESS NOTES
Pt is cooperative with care and medication compliant  Pt reports anxiety that " makes it so she cant even eat lunch"  Pt observed sitting calmly amongst peers in dayroom and resting calmly in room  No s/s noted  Pt denies all other s/s including SI and HI  Pt remains seclusive to room and self only coming out for meals  Pt is currently resting calm and quiet in room  Will continue to monitor

## 2019-06-25 NOTE — OCCUPATIONAL THERAPY NOTE
Occupational Therapy Activity Treatment Note      [facility]    6/25/2019    Patient Active Problem List   Diagnosis    Sepsis ([facility] [address] )    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[facility] )    Tobacco use disorder, continuous    Bipolar disorder ([facility] )    Left hip pain    Lactic acidosis    Hypokalemia    Hypomagnesemia    Compression fracture of L4 lumbar vertebra    Thoracic compression fracture (HCC)    Ventral hernia    Parapneumonic effusion    Acute on chronic respiratory failure with hypoxia (HCC)    Chronic respiratory failure (HCC)    Hypophosphatemia    Elevated MCV    Compression deformity of vertebra    Schizoaffective disorder, bipolar type ([facility] [address] )    Acquired hypothyroidism    Gastroesophageal reflux disease without esophagitis    Abnormal CT of the chest    Excessive cerumen in left ear canal    Lipoma of right upper extremity    Polydipsia    Localized swelling of both lower legs       Past Medical History:   Diagnosis Date    Acid reflux     Anxiety     Asthma     Bipolar 1 disorder (HCC)     Chronic pain disorder     Chronic respiratory failure (HCC)     Compression fracture of fourth lumbar vertebra ([facility] [address] )     COPD (chronic obstructive pulmonary disease) (HCC)     Depression     GERD (gastroesophageal reflux disease)     History of home oxygen therapy     Hypothyroidism     Lipoma of upper extremity     Psychiatric illness     Schizoaffective disorder ([facility] [address] )     Substance abuse ([facility] [address] )     Nicotine    Thoracic compression fracture ([facility] [address] )     Ventral hernia        No past surgical history on file  06/25/19 1512   Assessment   Assessment Patsy was agreeable to OT involvement on this day  However, she stated that she wanted to primarily talk on this day  She was in her bed, she did state that she has been feeling restless, she attributed this to her medication which she asserted needed to be reduced   She was encouraged to explore and carry out relaxation strategies, I e , deep breathing, progressive muscle relaxation, but she declined, stating that she was too restless to do this, that she did not want to do anything due to her restlessness  However, after some conversation (she talked about visits from sister, brother days earlier, and that she was considering places to live, I e , [employer], perhaps moving in with her parents and 2 brothers), she did agree to engagement in "[employer] for [employer]" activity  She did talk about personal values, I e , she values loyalty, honesty, and that she considers herself to be honest  She also talked about the most difficult situation in her life, I e , her fiance was murdered, but then she also talked about what she learned from that experience, I e , not to take things in life for granted  Progress has been slow at best towards her goals, but her willingness to engage in individual activity has been commended  Continue to promote positive focus, positive investment in her treatment  Continue to encourage her to explore, utilize positive life management strategies to promote optimum health and wellness  Plan   Treatment Interventions ADL retraining;Continued evaluation; Activityengagement  (coping skills, reality focus, life management)   Goal Expiration Date 07/14/19   Treatment Day 12   OT Frequency 2-3x/wk   Wild Bass OT

## 2019-06-25 NOTE — PLAN OF CARE
Problem: OCCUPATIONAL THERAPY ADULT  Goal: Performs self-care activities at highest level of function for planned discharge setting  See evaluation for individualized goals  Description  Treatment Interventions: ADL retraining, Continued evaluation, Activityengagement(coping skills, reality focus, life management)          See flowsheet documentation for full assessment, interventions and recommendations  Outcome: Progressing  Note:   Limitation: Decreased ADL status, Decreased high-level ADLs, Mood limitation(guarded, limited coping and life management skills)  Prognosis: Fair  Assessment: Patsy was agreeable to OT involvement on this day  However, she stated that she wanted to primarily talk on this day  She was in her bed, she did state that she has been feeling restless, she attributed this to her medication which she asserted needed to be reduced  She was encouraged to explore and carry out relaxation strategies, I e , deep breathing, progressive muscle relaxation, but she declined, stating that she was too restless to do this, that she did not want to do anything due to her restlessness  However, after some conversation (she talked about visits from sister, brother days earlier, and that she was considering places to live, I e , [employer], perhaps moving in with her parents and 2 brothers), she did agree to engagement in "Chicken Soup for the Soul" activity  She did talk about personal values, I e , she values loyalty, honesty, and that she considers herself to be honest  She also talked about the most difficult situation in her life, I e , her fiance was murdered, but then she also talked about what she learned from that experience, I e , not to take things in life for granted  Progress has been slow at best towards her goals, but her willingness to engage in individual activity has been commended  Continue to promote positive focus, positive investment in her treatment   Continue to encourage her to explore, utilize positive life management strategies to promote optimum health and wellness

## 2019-06-25 NOTE — PROGRESS NOTES
Pt requested blood sugar to be checked due to feeling "nauseous and feeling funny" blood sugar 84  Afternoon snack given and pt stated " I feel better"  Will continue to monitor

## 2019-06-25 NOTE — PROGRESS NOTES
06/25/19 1000   Team Meeting   Meeting Type Daily Rounds   Team Members Present   Team Members Present Physician;Nurse;;Occupational Therapist   Physician Team Member Dr Dia Pinedo Team Member [id number] Management Team Member Gregory Nolan    OT Team Member Kenya LR      Pt discussed at treatment rounds today, no medication changes made

## 2019-06-26 PROCEDURE — 99231 SBSQ HOSP IP/OBS SF/LOW 25: CPT | Performed by: NURSE PRACTITIONER

## 2019-06-26 PROCEDURE — 97530 THERAPEUTIC ACTIVITIES: CPT

## 2019-06-26 RX ORDER — BENZTROPINE MESYLATE 1 MG/ML
1 INJECTION INTRAMUSCULAR; INTRAVENOUS EVERY 8 HOURS PRN
Status: CANCELLED | OUTPATIENT
Start: 2019-06-26

## 2019-06-26 RX ADMIN — THEOPHYLLINE ANHYDROUS 200 MG: 200 CAPSULE, EXTENDED RELEASE ORAL at 08:23

## 2019-06-26 RX ADMIN — PANTOPRAZOLE SODIUM 40 MG: 40 TABLET, DELAYED RELEASE ORAL at 06:10

## 2019-06-26 RX ADMIN — FLUTICASONE FUROATE AND VILANTEROL TRIFENATATE 1 PUFF: 200; 25 POWDER RESPIRATORY (INHALATION) at 07:48

## 2019-06-26 RX ADMIN — LEVOTHYROXINE SODIUM 125 MCG: 125 TABLET ORAL at 06:11

## 2019-06-26 RX ADMIN — PALIPERIDONE 12 MG: 6 TABLET, EXTENDED RELEASE ORAL at 08:24

## 2019-06-26 RX ADMIN — QUETIAPINE FUMARATE 100 MG: 50 TABLET, EXTENDED RELEASE ORAL at 21:23

## 2019-06-26 NOTE — PROGRESS NOTES
Patient very anxious today because her room had to be switched to 648 because her previous room was too warm (>79 degrees) and maintenance stated it could not be fixed  Calling nurses repeatedly (bed smells like urine; nasal cannula smells like smoke) Needs addressed     Paranoia and preoccupation with somatic issues remain

## 2019-06-26 NOTE — CASE MANAGEMENT
Patient continues to be somatic, awaiting EAC bed  No changes  CM will continue to follow and provide services as needed

## 2019-06-26 NOTE — PROGRESS NOTES
Progress Note - Behavioral Health   Haily Holloway 60 y o  female MRN: [id number]  Unit/Bed#: [facility] 779-52 Encounter: [id number]    The patient was seen for continuing care and reviewed with treatment team  Staff reports that the patient remains calm, cooperative, and compliant with medications  Continues to refuse groups and remains self isolating to room  During assessment the patient is cooperative, but remains guarded and somatic regarding medications  Denies any current thoughts to hurt herself or others  Denies any auditory or visual hallucinations  Patient continues to remain paranoid about health concerns  Manipulative and staff splitting, but cooperative with directions and medications without encouragement  Mood is stable  Adequate sleep and appetite   No current medication changes     Mental Status Evaluation:  Appearance:  Marginal/poor hygiene   Behavior:  cooperative, guarded, Superficial and Dismissive   Fund of knowledge  aware of current events and Aware of past history   Speech:   Language: Normal rate and Normal volume  No overt abnormality   Mood:  stable   Affect:   Associations: blunted  Intact   Thought Process:  Circumstantial   Thought Content:  Paranoid and mistrustful, Somatic delusions and is somatically preoccupied   Perceptual Disturbances: Denies hallucinations and does not appear to be responding to internal stimuli   Risk Potential: No suicidal or homicidal ideation   Orientation  Oriented x 3   Memory No deficits   Attention/Concentration attention span appeared shorter than expected for age   Insight:  No insight   Judgment: Poor judgment   Gait/Station: normal gait/station and normal balance   Motor Activity: No abnormal movement noted     Progress Toward Goals: improving slightly    Assessment/Plan    Principal Problem:    Schizoaffective disorder, bipolar type ([facility])  Active Problems:    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[facility] [address] )    Acquired hypothyroidism    Gastroesophageal reflux disease without esophagitis      Recommended Treatment: Continue with pharmacotherapy, group therapy, milieu therapy and occupational therapy  The patient will be maintained on the following medications:    Current Facility-Administered Medications:  acetaminophen 650 mg Oral Q6H PRN Michelene Kawasaki, MD   albuterol 2 puff Inhalation Q4H PRN Rene Aliment, CRNP   aluminum-magnesium hydroxide-simethicone 15 mL Oral Q4H PRN Forest Reza MD   ammonium lactate  Topical BID PRN Rene Aliment, CRNP   benzonatate 100 mg Oral TID PRN Michelene Kawasaki, MD   benztropine 1 mg Intramuscular Q8H PRN Forest Reza MD   benztropine 1 mg Oral Q8H PRN Forest Reza MD   enoxaparin 40 mg Subcutaneous Daily Michelene Kawasaki, MD   fluticasone-vilanterol 1 puff Inhalation Daily Elsy Weathers MD   haloperidol 1 mg Oral Q6H PRN Blake Zambrano MD   haloperidol lactate 2 mg Intramuscular Q6H PRN Blake Zambrano MD   hydrOXYzine HCL 25 mg Oral Q6H PRN Forest Reza MD   levothyroxine 125 mcg Oral Early Morning Michelene Kawasaki, MD   magnesium hydroxide 30 mL Oral Daily PRN Mingo Marroquin MD   paliperidone 12 mg Oral Daily Rene Aliment, CRNP   Or       OLANZapine 10 mg Intramuscular Daily Rene Aliment, CRNP   QUEtiapine 100 mg Oral HS Rene Aliment, CRNP   Or       OLANZapine 10 mg Intramuscular HS Rene Aliment, CRNP   pantoprazole 40 mg Oral Early Morning Michelene Kawasaki, MD   polyethylene glycol 17 g Oral Daily PRN Rene Aliment, CRNP   risperiDONE 1 mg Oral Q8H PRN Froest Reza MD   theophylline 200 mg Oral Daily Michelene Kawasaki, MD   traZODone 25 mg Oral HS PRN Michelene Kawasaki, MD   tuberculin 5 Units Intradermal Once Blake Zambrano MD       Risks, benefits and possible side effects of Medications:   Risks, benefits, and possible side effects of medications explained to patient and patient verbalizes understanding

## 2019-06-26 NOTE — PROGRESS NOTES
06/26/19 0900   Team Meeting   Meeting Type Daily Rounds   Team Members Present   Team Members Present Physician;Nurse;;Occupational Therapist;Other (Discipline and Name)  (Pharmacist  )   Physician Team Member Dr Lopez Jalloh Team Member [id number] [facility] Management Team Member [address] Team Member Lamont Portlilo    Other (Discipline and Name) Fareed Lynn      Pt discussed in treatment rounds today, no medication changes made   Awaiting EAC bed

## 2019-06-26 NOTE — OCCUPATIONAL THERAPY NOTE
Occupational Therapy Activity Treatment Note      Pradeep Crowley    6/26/2019    Patient Active Problem List   Diagnosis    Sepsis ([facility] [address] )    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[facility] [address] )    Tobacco use disorder, continuous    Bipolar disorder ([facility] [address] )    Left hip pain    Lactic acidosis    Hypokalemia    Hypomagnesemia    Compression fracture of L4 lumbar vertebra    Thoracic compression fracture (HCC)    Ventral hernia    Parapneumonic effusion    Acute on chronic respiratory failure with hypoxia (HCC)    Chronic respiratory failure (HCC)    Hypophosphatemia    Elevated MCV    Compression deformity of vertebra    Schizoaffective disorder, bipolar type ([facility] [address] )    Acquired hypothyroidism    Gastroesophageal reflux disease without esophagitis    Abnormal CT of the chest    Excessive cerumen in left ear canal    Lipoma of right upper extremity    Polydipsia    Localized swelling of both lower legs       Past Medical History:   Diagnosis Date    Acid reflux     Anxiety     Asthma     Bipolar 1 disorder (HCC)     Chronic pain disorder     Chronic respiratory failure (HCC)     Compression fracture of fourth lumbar vertebra (HCC)     COPD (chronic obstructive pulmonary disease) (HCC)     Depression     GERD (gastroesophageal reflux disease)     History of home oxygen therapy     Hypothyroidism     Lipoma of upper extremity     Psychiatric illness     Schizoaffective disorder ([facility] [address] )     [facility]     Nicotine    Thoracic compression fracture ([facility] [address] )     Ventral hernia        No past surgical history on file      06/26/19 6417   Assessment   Assessment Rex Martinez was resting in her bed when approached for OT involvement  She was pleasant, she appeared in good spirits  She talked about getting her portable O2 machine, she stated that now she would be able to go to other rooms that did not have O2 hook up  Her affect was positive   She also talked about moving her room, she stated that she was happy for this because her other room was very hot and she could sleep much better in a cooler room  She does continue to state that all she needs is a place to go, then she can leave the hospital  When [facility] was mentioned, she stated that if she has a place to go, she will not need to go there  She did engage in current event discussion and activity  She made one comment about her medication (she felt that she was on too much), but she did not perseverate on this  Progress has been slow but consistent towards her goals on this occasion when engaged with this writer  She does continue to minimize her need for continued hospitalization, medication, treatment, but she has been less focussed on this on this day  Continue to encourage her to explore more positive life management strategies to promote optimum health and wellness  Plan   Treatment Interventions ADL retraining; Endurance training;Continued evaluation; Activityengagement  (coping skills, life management, reality focus)   Goal Expiration Date 07/14/19   Treatment Day 13   OT Frequency 2-3x/wk   Aicha Alicea, OT

## 2019-06-26 NOTE — PROGRESS NOTES
Patient is currently in her bed  Appears to be sleeping  2 liters of oxygen  No signs of distress or discomfort noted  Will continue to monitor on q 7 minute checks

## 2019-06-26 NOTE — PROGRESS NOTES
Witness patient disconnect herself from wall oxygen and walk out to nurse's stations and make two phone calls each lasting about five minutes with out oxygen the entire time  She did not appear to be in any distress and was laughing through out both phone conversations

## 2019-06-26 NOTE — PROGRESS NOTES
Patient is alert and oriented times 4  Is isolative, irritable and attention seeking this morning  Patient states " I want to talk with Dr Js Craft about my room" Patient is upset because had room switched  due to cooling problems  Denies suicidal thoughts  Informs BM yesterday refusing milk of magnesia offered due to last BM informed on report dated  6/21  Patient reassured and behavior managed with encouragement for care plan compliance  Will continue to monitor

## 2019-06-26 NOTE — PLAN OF CARE
Problem: Alteration in Thoughts and Perception  Goal: Treatment Goal: Gain control of psychotic behaviors/thinking, reduce/eliminate presenting symptoms and demonstrate improved reality functioning upon discharge  Outcome: Progressing  Goal: Verbalize thoughts and feelings  Description  Interventions:  - Promote a nonjudgmental and trusting relationship with the patient through active listening and therapeutic communication  - Assess patient's level of functioning, behavior and potential for risk  - Engage patient in 1 on 1 interactions for a minimum of 15 minutes each session  - Encourage patient to express fears, feelings, frustrations, and discuss symptoms    - [place] patient to reality, help patient recognize reality-based thinking   - Administer medications as ordered and assess for potential side effects  - Provide the patient education related to the signs and symptoms of the illness and desired effects of prescribed medications  Outcome: Progressing  Goal: Refrain from acting on delusional thinking/internal stimuli  Description  Interventions:  - Monitor patient closely, per order   - Utilize least restrictive measures   - Set reasonable limits, give positive feedback for acceptable   - Administer medications as ordered and monitor of potential side effects  Outcome: Progressing  Goal: Agree to be compliant with medication regime, as prescribed and report medication side effects  Description  Interventions:  - Offer appropriate PRN medication and supervise ingestion; conduct aims, as needed   Outcome: Progressing  Goal: Attend and participate in unit activities, including therapeutic, recreational, and educational groups  Description  Interventions:  - Provide therapeutic and educational activities daily, encourage attendance and participation, and document same in the medical record   Outcome: Progressing  Goal: Recognize dysfunctional thoughts, communicate reality-based thoughts at the time of discharge  Description  Interventions:  - Provide medication and psycho-education to assist patient in compliance and developing insight into his/her illness   Outcome: Progressing  Goal: Complete daily ADLs, including personal hygiene independently, as able  Description  Interventions:  - Observe, teach, and assist patient with ADLS  - Monitor and promote a balance of rest/activity, with adequate nutrition and elimination   Outcome: Progressing     Problem: Ineffective Coping  Goal: Identifies ineffective coping skills  Outcome: Progressing  Goal: Identifies healthy coping skills  Outcome: Progressing  Goal: Demonstrates healthy coping skills  Outcome: Progressing  Goal: Participates in unit activities  Description  Interventions:  - Provide therapeutic environment   - Provide required programming   - Redirect inappropriate behaviors   Outcome: Progressing  Goal: Patient/Family participate in treatment and DC plans  Description  Interventions:  - Provide therapeutic environment  Outcome: Progressing  Goal: Patient/Family verbalizes awareness of resources  Outcome: Progressing  Goal: Understands least restrictive measures  Description  Interventions:  - Utilize least restrictive behavior  Outcome: Progressing  Goal: Free from restraint events  Description  - Utilize least restrictive measures   - Provide behavioral interventions   - Redirect inappropriate behaviors   Outcome: Progressing     Problem: Risk for Self Injury/Neglect  Goal: Treatment Goal: Remain safe during length of stay, learn and adopt new coping skills, and be free of self-injurious ideation, impulses and acts at the time of discharge  Outcome: Progressing  Goal: Verbalize thoughts and feelings  Description  Interventions:  - Assess and re-assess patient's lethality and potential for self-injury  - Engage patient in 1:1 interactions, daily, for a minimum of 15 minutes  - Encourage patient to express feelings, fears, frustrations, hopes  - Establish rapport/trust with patient   Outcome: Progressing  Goal: Refrain from harming self  Description  Interventions:  - Monitor patient closely, per order  - Develop a trusting relationship  - Supervise medication ingestion, monitor effects and side effects   Outcome: Progressing  Goal: Attend and participate in unit activities, including therapeutic, recreational, and educational groups  Description  Interventions:  - Provide therapeutic and educational activities daily, encourage attendance and participation, and document same in the medical record  - Obtain collateral information, encourage visitation and family involvement in care   Outcome: Progressing  Goal: Recognize maladaptive responses and adopt new coping mechanisms  Outcome: Progressing  Goal: Complete daily ADLs, including personal hygiene independently, as able  Description  Interventions:  - Observe, teach, and assist patient with ADLS  - Monitor and promote a balance of rest/activity, with adequate nutrition and elimination  Outcome: Progressing     Problem: Depression  Goal: Treatment Goal: Demonstrate behavioral control of depressive symptoms, verbalize feelings of improved mood/affect, and adopt new coping skills prior to discharge  Outcome: Progressing  Goal: Verbalize thoughts and feelings  Description  Interventions:  - Assess and re-assess patient's level of risk   - Engage patient in 1:1 interactions, daily, for a minimum of 15 minutes   - Encourage patient to express feelings, fears, frustrations, hopes   Outcome: Progressing  Goal: Refrain from harming self  Description  Interventions:  - Monitor patient closely, per order   - Supervise medication ingestion, monitor effects and side effects   Outcome: Progressing  Goal: Refrain from isolation  Description  Interventions:  - Develop a trusting relationship   - Encourage socialization   Outcome: Progressing  Goal: Refrain from self-neglect  Outcome: Progressing  Goal: Attend and participate in unit activities, including therapeutic, recreational, and educational groups  Description  Interventions:  - Provide therapeutic and educational activities daily, encourage attendance and participation, and document same in the medical record   Outcome: Progressing  Goal: Complete daily ADLs, including personal hygiene independently, as able  Description  Interventions:  - Observe, teach, and assist patient with ADLS  -  Monitor and promote a balance of rest/activity, with adequate nutrition and elimination   Outcome: Progressing     Problem: Anxiety  Goal: Anxiety is at manageable level  Description  Interventions:  - Assess and monitor patient's anxiety level  - Monitor for signs and symptoms of anxiety both physical and emotional (heart palpitations, chest pain, shortness of breath, headaches, nausea, feeling jumpy, restlessness, irritable, apprehensive)  - Collaborate with interdisciplinary team and initiate plan and interventions as ordered    - [place] patient to unit/surroundings  - Explain treatment plan  - Encourage participation in care  - Encourage verbalization of concerns/fears  - Identify coping mechanisms  - Assist in developing anxiety-reducing skills  - Administer/offer alternative therapies  - Limit or eliminate stimulants  Outcome: Progressing     Problem: DISCHARGE PLANNING - CARE MANAGEMENT  Goal: Discharge to post-acute care or home with appropriate resources  Description  INTERVENTIONS:  - Conduct assessment to determine patient/family and health care team treatment goals, and need for post-acute services based on payer coverage, community resources, and patient preferences, and barriers to discharge  - Address psychosocial, clinical, and financial barriers to discharge as identified in assessment in conjunction with the patient/family and health care team  - Arrange appropriate level of post-acute services according to patients   needs and preference and payer coverage in collaboration with the physician and health care team  - Communicate with and update the patient/family, physician, and health care team regarding progress on the discharge plan  - Arrange appropriate transportation to post-acute venues  Outcome: Progressing     Problem: Prexisting or High Potential for Compromised Skin Integrity  Goal: Skin integrity is maintained or improved  Description  INTERVENTIONS:  - Identify patients at risk for skin breakdown  - Assess and monitor skin integrity  - Assess and monitor nutrition and hydration status  - Monitor labs (i e  albumin)  - Assess for incontinence   - Turn and reposition patient  - Assist with mobility/ambulation  - Relieve pressure over bony prominences  - Avoid friction and shearing  - Provide appropriate hygiene as needed including keeping skin clean and dry  - Evaluate need for skin moisturizer/barrier cream  - Collaborate with interdisciplinary team (i e  Nutrition, Rehabilitation, etc )   - Patient/family teaching  Outcome: Progressing

## 2019-06-26 NOTE — PLAN OF CARE
Problem: OCCUPATIONAL THERAPY ADULT  Goal: Performs self-care activities at highest level of function for planned discharge setting  See evaluation for individualized goals  Description  Treatment Interventions: ADL retraining, Endurance training, Continued evaluation, Activityengagement(coping skills, life management, reality focus)          See flowsheet documentation for full assessment, interventions and recommendations  Outcome: Progressing  Note:   Limitation: Decreased ADL status, Decreased high-level ADLs, Mood limitation(guarded, limited coping and life management skills)  Prognosis: Fair  Assessment: Neva Baumann was resting in her bed when approached for OT involvement  She was pleasant, she appeared in good spirits  She talked about getting her portable O2 machine, she stated that now she would be able to go to other rooms that did not have O2 hook up  Her affect was positive  She also talked about moving her room, she stated that she was happy for this because her other room was very hot and she could sleep much better in a cooler room  She does continue to state that all she needs is a place to go, then she can leave the hospital  When [facility] was mentioned, she stated that if she has a place to go, she will not need to go there  She did engage in current event discussion and activity  She made one comment about her medication (she felt that she was on too much), but she did not perseverate on this  Progress has been slow but consistent towards her goals on this occasion when engaged with this writer  She does continue to minimize her need for continued hospitalization, medication, treatment, but she has been less focussed on this on this day  Continue to encourage her to explore more positive life management strategies to promote optimum health and wellness

## 2019-06-27 PROCEDURE — 99232 SBSQ HOSP IP/OBS MODERATE 35: CPT | Performed by: NURSE PRACTITIONER

## 2019-06-27 RX ADMIN — BENZONATATE 100 MG: 100 CAPSULE ORAL at 09:28

## 2019-06-27 RX ADMIN — QUETIAPINE FUMARATE 100 MG: 50 TABLET, EXTENDED RELEASE ORAL at 21:13

## 2019-06-27 RX ADMIN — PALIPERIDONE 12 MG: 6 TABLET, EXTENDED RELEASE ORAL at 09:28

## 2019-06-27 RX ADMIN — FLUTICASONE FUROATE AND VILANTEROL TRIFENATATE 1 PUFF: 200; 25 POWDER RESPIRATORY (INHALATION) at 09:28

## 2019-06-27 RX ADMIN — PANTOPRAZOLE SODIUM 40 MG: 40 TABLET, DELAYED RELEASE ORAL at 05:37

## 2019-06-27 RX ADMIN — THEOPHYLLINE ANHYDROUS 200 MG: 200 CAPSULE, EXTENDED RELEASE ORAL at 09:28

## 2019-06-27 RX ADMIN — LEVOTHYROXINE SODIUM 125 MCG: 125 TABLET ORAL at 05:37

## 2019-06-27 NOTE — PROGRESS NOTES
06/27/19 1000   Team Meeting   Meeting Type Daily Rounds   Team Members Present   Team Members Present Physician;Nurse;   Physician Team Member Dr Benny Troy Team Member [id number] [facility] Team Member [id number] [facility] discussed at treatment rounds today, no medication changes made today

## 2019-06-27 NOTE — PROGRESS NOTES
Pt present on the unit  Pt occassionally somatic  Pt appears to be fixated on medication s/s and O2 use  Pt noted associating with peers at dinner  Pt calm and compliant with medication

## 2019-06-27 NOTE — NURSING NOTE
Azell Closs is bright and friendly on approach, cooperative with care and medication regimen  Azell Closs has been very isolated today, comes out of her room to eat meals then resumes back to her room to nap, connected to 2 L 02  Refused to attend group activities  Patient stated she's ready to go home

## 2019-06-27 NOTE — PLAN OF CARE
Problem: Alteration in Thoughts and Perception  Goal: Treatment Goal: Gain control of psychotic behaviors/thinking, reduce/eliminate presenting symptoms and demonstrate improved reality functioning upon discharge  Outcome: Progressing  Goal: Verbalize thoughts and feelings  Description  Interventions:  - Promote a nonjudgmental and trusting relationship with the patient through active listening and therapeutic communication  - Assess patient's level of functioning, behavior and potential for risk  - Engage patient in 1 on 1 interactions for a minimum of 15 minutes each session  - Encourage patient to express fears, feelings, frustrations, and discuss symptoms    - [place] patient to reality, help patient recognize reality-based thinking   - Administer medications as ordered and assess for potential side effects  - Provide the patient education related to the signs and symptoms of the illness and desired effects of prescribed medications  Outcome: Progressing  Goal: Refrain from acting on delusional thinking/internal stimuli  Description  Interventions:  - Monitor patient closely, per order   - Utilize least restrictive measures   - Set reasonable limits, give positive feedback for acceptable   - Administer medications as ordered and monitor of potential side effects  Outcome: Progressing  Goal: Agree to be compliant with medication regime, as prescribed and report medication side effects  Description  Interventions:  - Offer appropriate PRN medication and supervise ingestion; conduct aims, as needed   Outcome: Progressing  Goal: Recognize dysfunctional thoughts, communicate reality-based thoughts at the time of discharge  Description  Interventions:  - Provide medication and psycho-education to assist patient in compliance and developing insight into his/her illness   Outcome: Progressing  Goal: Complete daily ADLs, including personal hygiene independently, as able  Description  Interventions:  - Observe, teach, and assist patient with ADLS  - Monitor and promote a balance of rest/activity, with adequate nutrition and elimination   Outcome: Progressing     Problem: Risk for Self Injury/Neglect  Goal: Treatment Goal: Remain safe during length of stay, learn and adopt new coping skills, and be free of self-injurious ideation, impulses and acts at the time of discharge  Outcome: Progressing  Goal: Verbalize thoughts and feelings  Description  Interventions:  - Assess and re-assess patient's lethality and potential for self-injury  - Engage patient in 1:1 interactions, daily, for a minimum of 15 minutes  - Encourage patient to express feelings, fears, frustrations, hopes  - Establish rapport/trust with patient   Outcome: Progressing  Goal: Refrain from harming self  Description  Interventions:  - Monitor patient closely, per order  - Develop a trusting relationship  - Supervise medication ingestion, monitor effects and side effects   Outcome: Progressing  Goal: Recognize maladaptive responses and adopt new coping mechanisms  Outcome: Progressing  Goal: Complete daily ADLs, including personal hygiene independently, as able  Description  Interventions:  - Observe, teach, and assist patient with ADLS  - Monitor and promote a balance of rest/activity, with adequate nutrition and elimination  Outcome: Progressing     Problem: Depression  Goal: Treatment Goal: Demonstrate behavioral control of depressive symptoms, verbalize feelings of improved mood/affect, and adopt new coping skills prior to discharge  Outcome: Progressing  Goal: Verbalize thoughts and feelings  Description  Interventions:  - Assess and re-assess patient's level of risk   - Engage patient in 1:1 interactions, daily, for a minimum of 15 minutes   - Encourage patient to express feelings, fears, frustrations, hopes   Outcome: Progressing  Goal: Refrain from harming self  Description  Interventions:  - Monitor patient closely, per order   - Supervise medication ingestion, monitor effects and side effects   Outcome: Progressing  Goal: Refrain from isolation  Description  Interventions:  - Develop a trusting relationship   - Encourage socialization   Outcome: Progressing  Goal: Refrain from self-neglect  Outcome: Progressing  Goal: Attend and participate in unit activities, including therapeutic, recreational, and educational groups  Description  Interventions:  - Provide therapeutic and educational activities daily, encourage attendance and participation, and document same in the medical record   Outcome: Progressing  Goal: Complete daily ADLs, including personal hygiene independently, as able  Description  Interventions:  - Observe, teach, and assist patient with ADLS  -  Monitor and promote a balance of rest/activity, with adequate nutrition and elimination   Outcome: Progressing     Problem: Anxiety  Goal: Anxiety is at manageable level  Description  Interventions:  - Assess and monitor patient's anxiety level  - Monitor for signs and symptoms of anxiety both physical and emotional (heart palpitations, chest pain, shortness of breath, headaches, nausea, feeling jumpy, restlessness, irritable, apprehensive)  - Collaborate with interdisciplinary team and initiate plan and interventions as ordered    - [place] patient to unit/surroundings  - Explain treatment plan  - Encourage participation in care  - Encourage verbalization of concerns/fears  - Identify coping mechanisms  - Assist in developing anxiety-reducing skills  - Administer/offer alternative therapies  - Limit or eliminate stimulants  Outcome: Progressing     Problem: Prexisting or High Potential for Compromised Skin Integrity  Goal: Skin integrity is maintained or improved  Description  INTERVENTIONS:  - Identify patients at risk for skin breakdown  - Assess and monitor skin integrity  - Assess and monitor nutrition and hydration status  - Monitor labs (i e  albumin)  - Assess for incontinence   - Turn and reposition patient  - Assist with mobility/ambulation  - Relieve pressure over bony prominences  - Avoid friction and shearing  - Provide appropriate hygiene as needed including keeping skin clean and dry  - Evaluate need for skin moisturizer/barrier cream  - Collaborate with interdisciplinary team (i e  Nutrition, Rehabilitation, etc )   - Patient/family teaching  Outcome: Progressing

## 2019-06-27 NOTE — PROGRESS NOTES
Progress Note - Behavioral Health   Merly Galaviz 60 y o  female MRN: [id number]  Unit/Bed#: [facility] 176-56 Encounter: [id number]    The patient was seen for continuing care and reviewed with treatment team  Staff reports that the patient continues to remain self isolating, but visible on the unit  Calm, cooperative, and compliant with medications  During assessment the patient continues to remain somatic regarding medications  Reports that the medications are making her agitated and restless, but was found sleeping soundly when entering the room  The patient is frequently found making complaints, but then refuses the help after requested  Remains paranoid, suspicious, delusional, and somatic  Appear more linear in conversation and mood remains stable  Continue to await for EAC bed  No current changes will be made      Mental Status Evaluation:  Appearance:  Marginal/poor hygiene   Behavior:  cooperative, Superficial and Dismissive   Fund of knowledge  aware of current events and Aware of past history   Speech:   Language: Normal rate and Normal volume  No overt abnormality   Mood:  improving   Affect:   Associations: blunted  Intact   Thought Process:  Circumstantial   Thought Content:  Paranoid and mistrustful, Delusions of persecution, Grandiose delusions, Obsessive ruminations and is somatically preoccupied   Perceptual Disturbances: Denies hallucinations and does not appear to be responding to internal stimuli   Risk Potential: No suicidal or homicidal ideation   Orientation  Oriented x 3   Memory No deficits   Attention/Concentration attention span appeared shorter than expected for age   Insight:  No insight   Judgment: Poor judgment   Gait/Station: normal gait/station and normal balance   Motor Activity: No abnormal movement noted     Progress Toward Goals: slightly improved  Assessment/Plan    Principal Problem:    Schizoaffective disorder, bipolar type ([facility] )  Active Problems:    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[facility] ) Acquired hypothyroidism    Gastroesophageal reflux disease without esophagitis      Recommended Treatment: Continue with pharmacotherapy, group therapy, milieu therapy and occupational therapy  The patient will be maintained on the following medications:    Current Facility-Administered Medications:  acetaminophen 650 mg Oral Q6H PRN Zeeshan Taylor MD   albuterol 2 puff Inhalation Q4H PRN Jane Alvarezs, CRNP   aluminum-magnesium hydroxide-simethicone 15 mL Oral Q4H PRN Pretty Lopez MD   ammonium lactate  Topical BID PRN Jane Rodas, ABILIO   benzonatate 100 mg Oral TID PRN Zeeshan Taylor MD   benztropine 1 mg Intramuscular Q8H PRN Pretty Lopez MD   benztropine 1 mg Oral Q8H PRN Pretty Lopez MD   enoxaparin 40 mg Subcutaneous Daily Zeeshan Taylor MD   fluticasone-vilanterol 1 puff Inhalation Daily Jane Boogie MD   haloperidol 1 mg Oral Q6H PRN Chelle Heredia MD   haloperidol lactate 2 mg Intramuscular Q6H PRN Chelle Heredia MD   hydrOXYzine HCL 25 mg Oral Q6H PRN Pretty Lopez MD   levothyroxine 125 mcg Oral Early Morning Zeeshan Taylor MD   magnesium hydroxide 30 mL Oral Daily PRN Mihai Glover MD   paliperidone 12 mg Oral Daily Marda [place], CRNP   Or       OLANZapine 10 mg Intramuscular Daily Marda [place], CRNP   QUEtiapine 100 mg Oral HS Marda [place], CRNP   Or       OLANZapine 10 mg Intramuscular HS Marda [place], CRNP   pantoprazole 40 mg Oral Early Morning Zeeshan Taylor MD   polyethylene glycol 17 g Oral Daily PRN Marda [place], CRNP   risperiDONE 1 mg Oral Q8H PRN Pretty Lopez MD   theophylline 200 mg Oral Daily Zeeshan Taylor MD   traZODone 25 mg Oral HS PRN Zeeshan Taylor MD   tuberculin 5 Units Intradermal Once Chelle Heredia MD       Risks, benefits and possible side effects of Medications:   Risks, benefits, and possible side effects of medications explained to patient and patient verbalizes understanding

## 2019-06-27 NOTE — PROGRESS NOTES
Patient slept all night without any complain and she is presently in the day room  Compliant with her AM medications  On  oxygen 2L continuous  Q 7 minutes safety checks on going  Will continue to monitor

## 2019-06-27 NOTE — PLAN OF CARE
Problem: Alteration in Thoughts and Perception  Goal: Treatment Goal: Gain control of psychotic behaviors/thinking, reduce/eliminate presenting symptoms and demonstrate improved reality functioning upon discharge  Outcome: Progressing  Goal: Verbalize thoughts and feelings  Description  Interventions:  - Promote a nonjudgmental and trusting relationship with the patient through active listening and therapeutic communication  - Assess patient's level of functioning, behavior and potential for risk  - Engage patient in 1 on 1 interactions for a minimum of 15 minutes each session  - Encourage patient to express fears, feelings, frustrations, and discuss symptoms    - Gate patient to reality, help patient recognize reality-based thinking   - Administer medications as ordered and assess for potential side effects  - Provide the patient education related to the signs and symptoms of the illness and desired effects of prescribed medications  Outcome: Progressing  Goal: Refrain from acting on delusional thinking/internal stimuli  Description  Interventions:  - Monitor patient closely, per order   - Utilize least restrictive measures   - Set reasonable limits, give positive feedback for acceptable   - Administer medications as ordered and monitor of potential side effects  Outcome: Progressing  Goal: Agree to be compliant with medication regime, as prescribed and report medication side effects  Description  Interventions:  - Offer appropriate PRN medication and supervise ingestion; conduct aims, as needed   Outcome: Progressing  Goal: Attend and participate in unit activities, including therapeutic, recreational, and educational groups  Description  Interventions:  - Provide therapeutic and educational activities daily, encourage attendance and participation, and document same in the medical record   Outcome: Progressing  Goal: Recognize dysfunctional thoughts, communicate reality-based thoughts at the time of discharge  Description  Interventions:  - Provide medication and psycho-education to assist patient in compliance and developing insight into his/her illness   Outcome: Progressing  Goal: Complete daily ADLs, including personal hygiene independently, as able  Description  Interventions:  - Observe, teach, and assist patient with ADLS  - Monitor and promote a balance of rest/activity, with adequate nutrition and elimination   Outcome: Progressing     Problem: Ineffective Coping  Goal: Identifies ineffective coping skills  Outcome: Progressing  Goal: Identifies healthy coping skills  Outcome: Progressing  Goal: Demonstrates healthy coping skills  Outcome: Progressing  Goal: Participates in unit activities  Description  Interventions:  - Provide therapeutic environment   - Provide required programming   - Redirect inappropriate behaviors   Outcome: Progressing  Goal: Patient/Family participate in treatment and DC plans  Description  Interventions:  - Provide therapeutic environment  Outcome: Progressing  Goal: Patient/Family verbalizes awareness of resources  Outcome: Progressing  Goal: Understands least restrictive measures  Description  Interventions:  - Utilize least restrictive behavior  Outcome: Progressing  Goal: Free from restraint events  Description  - Utilize least restrictive measures   - Provide behavioral interventions   - Redirect inappropriate behaviors   Outcome: Progressing     Problem: Risk for Self Injury/Neglect  Goal: Treatment Goal: Remain safe during length of stay, learn and adopt new coping skills, and be free of self-injurious ideation, impulses and acts at the time of discharge  Outcome: Progressing  Goal: Verbalize thoughts and feelings  Description  Interventions:  - Assess and re-assess patient's lethality and potential for self-injury  - Engage patient in 1:1 interactions, daily, for a minimum of 15 minutes  - Encourage patient to express feelings, fears, frustrations, hopes  - Establish rapport/trust with patient   Outcome: Progressing  Goal: Refrain from harming self  Description  Interventions:  - Monitor patient closely, per order  - Develop a trusting relationship  - Supervise medication ingestion, monitor effects and side effects   Outcome: Progressing  Goal: Attend and participate in unit activities, including therapeutic, recreational, and educational groups  Description  Interventions:  - Provide therapeutic and educational activities daily, encourage attendance and participation, and document same in the medical record  - Obtain collateral information, encourage visitation and family involvement in care   Outcome: Progressing  Goal: Recognize maladaptive responses and adopt new coping mechanisms  Outcome: Progressing  Goal: Complete daily ADLs, including personal hygiene independently, as able  Description  Interventions:  - Observe, teach, and assist patient with ADLS  - Monitor and promote a balance of rest/activity, with adequate nutrition and elimination  Outcome: Progressing     Problem: Depression  Goal: Treatment Goal: Demonstrate behavioral control of depressive symptoms, verbalize feelings of improved mood/affect, and adopt new coping skills prior to discharge  Outcome: Progressing  Goal: Verbalize thoughts and feelings  Description  Interventions:  - Assess and re-assess patient's level of risk   - Engage patient in 1:1 interactions, daily, for a minimum of 15 minutes   - Encourage patient to express feelings, fears, frustrations, hopes   Outcome: Progressing  Goal: Refrain from harming self  Description  Interventions:  - Monitor patient closely, per order   - Supervise medication ingestion, monitor effects and side effects   Outcome: Progressing  Goal: Refrain from isolation  Description  Interventions:  - Develop a trusting relationship   - Encourage socialization   Outcome: Progressing  Goal: Refrain from self-neglect  Outcome: Progressing  Goal: Attend and participate in unit activities, including therapeutic, recreational, and educational groups  Description  Interventions:  - Provide therapeutic and educational activities daily, encourage attendance and participation, and document same in the medical record   Outcome: Progressing  Goal: Complete daily ADLs, including personal hygiene independently, as able  Description  Interventions:  - Observe, teach, and assist patient with ADLS  -  Monitor and promote a balance of rest/activity, with adequate nutrition and elimination   Outcome: Progressing     Problem: Anxiety  Goal: Anxiety is at manageable level  Description  Interventions:  - Assess and monitor patient's anxiety level  - Monitor for signs and symptoms of anxiety both physical and emotional (heart palpitations, chest pain, shortness of breath, headaches, nausea, feeling jumpy, restlessness, irritable, apprehensive)  - Collaborate with interdisciplinary team and initiate plan and interventions as ordered    - [place] patient to unit/surroundings  - Explain treatment plan  - Encourage participation in care  - Encourage verbalization of concerns/fears  - Identify coping mechanisms  - Assist in developing anxiety-reducing skills  - Administer/offer alternative therapies  - Limit or eliminate stimulants  Outcome: Progressing     Problem: DISCHARGE PLANNING - CARE MANAGEMENT  Goal: Discharge to post-acute care or home with appropriate resources  Description  INTERVENTIONS:  - Conduct assessment to determine patient/family and health care team treatment goals, and need for post-acute services based on payer coverage, community resources, and patient preferences, and barriers to discharge  - Address psychosocial, clinical, and financial barriers to discharge as identified in assessment in conjunction with the patient/family and health care team  - Arrange appropriate level of post-acute services according to patients   needs and preference and payer coverage in collaboration with the physician and health care team  - Communicate with and update the patient/family, physician, and health care team regarding progress on the discharge plan  - Arrange appropriate transportation to post-acute venues  Outcome: Progressing     Problem: Prexisting or High Potential for Compromised Skin Integrity  Goal: Skin integrity is maintained or improved  Description  INTERVENTIONS:  - Identify patients at risk for skin breakdown  - Assess and monitor skin integrity  - Assess and monitor nutrition and hydration status  - Monitor labs (i e  albumin)  - Assess for incontinence   - Turn and reposition patient  - Assist with mobility/ambulation  - Relieve pressure over bony prominences  - Avoid friction and shearing  - Provide appropriate hygiene as needed including keeping skin clean and dry  - Evaluate need for skin moisturizer/barrier cream  - Collaborate with interdisciplinary team (i e  Nutrition, Rehabilitation, etc )   - Patient/family teaching  Outcome: Progressing

## 2019-06-28 PROCEDURE — 99232 SBSQ HOSP IP/OBS MODERATE 35: CPT | Performed by: PSYCHIATRY & NEUROLOGY

## 2019-06-28 RX ADMIN — PALIPERIDONE 12 MG: 6 TABLET, EXTENDED RELEASE ORAL at 08:34

## 2019-06-28 RX ADMIN — QUETIAPINE FUMARATE 100 MG: 50 TABLET, EXTENDED RELEASE ORAL at 21:29

## 2019-06-28 RX ADMIN — LEVOTHYROXINE SODIUM 125 MCG: 125 TABLET ORAL at 05:51

## 2019-06-28 RX ADMIN — THEOPHYLLINE ANHYDROUS 200 MG: 200 CAPSULE, EXTENDED RELEASE ORAL at 08:34

## 2019-06-28 RX ADMIN — FLUTICASONE FUROATE AND VILANTEROL TRIFENATATE 1 PUFF: 200; 25 POWDER RESPIRATORY (INHALATION) at 08:38

## 2019-06-28 RX ADMIN — PANTOPRAZOLE SODIUM 40 MG: 40 TABLET, DELAYED RELEASE ORAL at 05:51

## 2019-06-28 NOTE — PROGRESS NOTES
Pt visible on the unit  Pt somatic and fixated on O2 use  Pt continues on O2 2L, cooperative and compliant with medication this shift

## 2019-06-28 NOTE — CASE MANAGEMENT
Patient continues to be paranoid, somatic, and delusional  Patient constantly ringing call bed, requires consistent redirection  CM will continue to follow and provide services as needed

## 2019-06-28 NOTE — PROGRESS NOTES
Patient pleasant upon approach   Somatic at times  Denies  suicidal ideation /AH/VH  Med and meal compliant   Will continue to monitor

## 2019-06-28 NOTE — PLAN OF CARE
Problem: Alteration in Thoughts and Perception  Goal: Treatment Goal: Gain control of psychotic behaviors/thinking, reduce/eliminate presenting symptoms and demonstrate improved reality functioning upon discharge  Outcome: Progressing  Goal: Verbalize thoughts and feelings  Description  Interventions:  - Promote a nonjudgmental and trusting relationship with the patient through active listening and therapeutic communication  - Assess patient's level of functioning, behavior and potential for risk  - Engage patient in 1 on 1 interactions for a minimum of 15 minutes each session  - Encourage patient to express fears, feelings, frustrations, and discuss symptoms    - [place] patient to reality, help patient recognize reality-based thinking   - Administer medications as ordered and assess for potential side effects  - Provide the patient education related to the signs and symptoms of the illness and desired effects of prescribed medications  Outcome: Progressing  Goal: Refrain from acting on delusional thinking/internal stimuli  Description  Interventions:  - Monitor patient closely, per order   - Utilize least restrictive measures   - Set reasonable limits, give positive feedback for acceptable   - Administer medications as ordered and monitor of potential side effects  Outcome: Progressing  Goal: Agree to be compliant with medication regime, as prescribed and report medication side effects  Description  Interventions:  - Offer appropriate PRN medication and supervise ingestion; conduct aims, as needed   Outcome: Progressing  Goal: Recognize dysfunctional thoughts, communicate reality-based thoughts at the time of discharge  Description  Interventions:  - Provide medication and psycho-education to assist patient in compliance and developing insight into his/her illness   Outcome: Progressing  Goal: Complete daily ADLs, including personal hygiene independently, as able  Description  Interventions:  - Observe, teach, and assist patient with ADLS  - Monitor and promote a balance of rest/activity, with adequate nutrition and elimination   Outcome: Progressing     Problem: Risk for Self Injury/Neglect  Goal: Treatment Goal: Remain safe during length of stay, learn and adopt new coping skills, and be free of self-injurious ideation, impulses and acts at the time of discharge  Outcome: Progressing  Goal: Verbalize thoughts and feelings  Description  Interventions:  - Assess and re-assess patient's lethality and potential for self-injury  - Engage patient in 1:1 interactions, daily, for a minimum of 15 minutes  - Encourage patient to express feelings, fears, frustrations, hopes  - Establish rapport/trust with patient   Outcome: Progressing  Goal: Refrain from harming self  Description  Interventions:  - Monitor patient closely, per order  - Develop a trusting relationship  - Supervise medication ingestion, monitor effects and side effects   Outcome: Progressing  Goal: Recognize maladaptive responses and adopt new coping mechanisms  Outcome: Progressing  Goal: Complete daily ADLs, including personal hygiene independently, as able  Description  Interventions:  - Observe, teach, and assist patient with ADLS  - Monitor and promote a balance of rest/activity, with adequate nutrition and elimination  Outcome: Progressing     Problem: Depression  Goal: Treatment Goal: Demonstrate behavioral control of depressive symptoms, verbalize feelings of improved mood/affect, and adopt new coping skills prior to discharge  Outcome: Progressing  Goal: Verbalize thoughts and feelings  Description  Interventions:  - Assess and re-assess patient's level of risk   - Engage patient in 1:1 interactions, daily, for a minimum of 15 minutes   - Encourage patient to express feelings, fears, frustrations, hopes   Outcome: Progressing  Goal: Refrain from harming self  Description  Interventions:  - Monitor patient closely, per order   - Supervise medication ingestion, monitor effects and side effects   Outcome: Progressing  Goal: Refrain from isolation  Description  Interventions:  - Develop a trusting relationship   - Encourage socialization   Outcome: Progressing  Goal: Refrain from self-neglect  Outcome: Progressing  Goal: Complete daily ADLs, including personal hygiene independently, as able  Description  Interventions:  - Observe, teach, and assist patient with ADLS  -  Monitor and promote a balance of rest/activity, with adequate nutrition and elimination   Outcome: Progressing     Problem: Anxiety  Goal: Anxiety is at manageable level  Description  Interventions:  - Assess and monitor patient's anxiety level  - Monitor for signs and symptoms of anxiety both physical and emotional (heart palpitations, chest pain, shortness of breath, headaches, nausea, feeling jumpy, restlessness, irritable, apprehensive)  - Collaborate with interdisciplinary team and initiate plan and interventions as ordered    - [place] patient to unit/surroundings  - Explain treatment plan  - Encourage participation in care  - Encourage verbalization of concerns/fears  - Identify coping mechanisms  - Assist in developing anxiety-reducing skills  - Administer/offer alternative therapies  - Limit or eliminate stimulants  Outcome: Progressing     Problem: Prexisting or High Potential for Compromised Skin Integrity  Goal: Skin integrity is maintained or improved  Description  INTERVENTIONS:  - Identify patients at risk for skin breakdown  - Assess and monitor skin integrity  - Assess and monitor nutrition and hydration status  - Monitor labs (i e  albumin)  - Assess for incontinence   - Turn and reposition patient  - Assist with mobility/ambulation  - Relieve pressure over bony prominences  - Avoid friction and shearing  - Provide appropriate hygiene as needed including keeping skin clean and dry  - Evaluate need for skin moisturizer/barrier cream  - Collaborate with interdisciplinary team (i e  Nutrition, Rehabilitation, etc )   - Patient/family teaching  Outcome: Progressing

## 2019-06-28 NOTE — PROGRESS NOTES
[facility]  Psychiatrists  Progress Note    Patient Name: Davidson Goetz  MRN: [id number]  DOS: 06/28/19    Chief Complaint:  paranoid delusions    Interval History: Patient has been compliant with medications  Still pushes boundaries with staff  Patient remains paranoid, stating there's a chip or battery inserted under her skin  She states "my wifeadeline-lilyo" put it there but unable to describe who this is  She believes "they" can listen in on all her conversations with this device  She also reports hearing the voice of someone she does not know and this voice tells her about the device and what it does  Patient reports dizziness due to medications  Mental Status Exam [Per above +]  Appearance: limited grooming, fair hygiene; no abnormal involuntary movements noted  Behavior: guarded  Speech: pressured  Mood: anxious  Affect: congruent, stable, constricted  Thought process: circumstantial, tangential, illogical  Thought content: paranoid delusions elicited; denies SI/HI  Perceptual disturbances: denies AH/VH at this time  Cognition: oriented to all domains     Insight: poor  Judgement: poor      Current Facility-Administered Medications:     acetaminophen (TYLENOL) tablet 650 mg, 650 mg, Oral, Q6H PRN, Hollie Rubin MD    albuterol (PROVENTIL HFA,VENTOLIN HFA) inhaler 2 puff, 2 puff, Inhalation, Q4H PRN, ABILIO Nunes    aluminum-magnesium hydroxide-simethicone (MYLANTA) 200-200-20 mg/5 mL oral suspension 15 mL, 15 mL, Oral, Q4H PRN, Rosa Arroyo MD, 15 mL at 05/12/19 2221    ammonium lactate (LAC-HYDRIN) 12 % lotion, , Topical, BID PRN, ABILIO Nunes, 1 application at 87/78/21 1114    benzonatate (TESSALON PERLES) capsule 100 mg, 100 mg, Oral, TID PRN, Hollie Rubin MD, 100 mg at 06/27/19 0928    benztropine (COGENTIN) injection 1 mg, 1 mg, Intramuscular, Q8H PRN, Rosa Arroyo MD    benztropine (COGENTIN) tablet 1 mg, 1 mg, Oral, Q8H PRN, Dia Ricks MD    enoxaparin (LOVENOX) subcutaneous injection 40 mg, 40 mg, Subcutaneous, Daily, Erik Schumacher MD    fluticasone-vilanterol (BREO ELLIPTA) 200-25 MCG/INH inhaler 1 puff, 1 puff, Inhalation, Daily, Itzel Kuhn MD, 1 puff at 06/28/19 0838    haloperidol (HALDOL) oral concentrated solution 1 mg, 1 mg, Oral, Q6H PRN, Deb Jansen MD    haloperidol lactate (HALDOL) injection 2 mg, 2 mg, Intramuscular, Q6H PRN, Deb Jansen MD    hydrOXYzine HCL (ATARAX) tablet 25 mg, 25 mg, Oral, Q6H PRN, Dia Ricks MD, 25 mg at 05/29/19 1326    levothyroxine tablet 125 mcg, 125 mcg, Oral, Early Morning, Erik Schumacher MD, 125 mcg at 06/28/19 0551    magnesium hydroxide (MILK OF MAGNESIA) 400 mg/5 mL oral suspension 30 mL, 30 mL, Oral, Daily PRN, Shawn Connors MD    paliperidone (INVEGA) 24 hr tablet 12 mg, 12 mg, Oral, Daily, 12 mg at 06/28/19 0834 **OR** OLANZapine (ZyPREXA) IM injection 10 mg, 10 mg, Intramuscular, Daily, Lutricia Belling, CRNP    QUEtiapine (SEROquel XR) 24 hr tablet 100 mg, 100 mg, Oral, HS, 100 mg at 06/27/19 2113 **OR** OLANZapine (ZyPREXA) IM injection 10 mg, 10 mg, Intramuscular, HS, Lutricia Belling, CRNP    pantoprazole (PROTONIX) EC tablet 40 mg, 40 mg, Oral, Early Morning, Erik Schumacher MD, 40 mg at 06/28/19 0551    polyethylene glycol (MIRALAX) packet 17 g, 17 g, Oral, Daily PRN, Lutricia Belling, CRNP    risperiDONE (RisperDAL M-TABS) dispersible tablet 1 mg, 1 mg, Oral, Q8H PRN, Dia Ricks MD    theophylline (JEF-24) 24 hr capsule 200 mg, 200 mg, Oral, Daily, Erik Schumacher MD, 200 mg at 06/28/19 0834    traZODone (DESYREL) tablet 25 mg, 25 mg, Oral, HS PRN, Erik Schumacher MD    tuberculin injection 5 Units, 5 Units, Intradermal, Once, Deb Jansen MD, Stopped at 05/22/19 1425    Vitals:    06/28/19 1454   BP: 95/63   Pulse: 87   Resp: 18   Temp: 98 °F (36 7 °C)   SpO2: 97%       Lab/work up: no new labs    Assessment: Axis I:  · Schizoaffective disorder; has been treatment resistant  Axis II:  · OCPD  · Cluster B traits  Axis III:  - COPD with asthma, stable compression fracture of L4   Axis IV:  · Limited social support, chronically homeless, financial problems, on disability, noncompliant with treatment    Progress: limited    Plan:   1  Disposition: Patient meets criteria for ongoing acute inpatient treatment due to being danger to self 2/2 psychosis and poor self care  Patient will be discharged when there is an absence of psychosis [id number]  2  Legal status: 304  3  Psychopharmacologic interventions:   - continue invega 12mg po daily and seroquel 100mg po qhs   - will consider changing to clozaril pending orthostatic BP check  - Continue to monitor psychosis  4  Medical comorbidities: Hospitalist following PRN  5  Other therapies: Individual/group/milieu therapy as appropriate   6  Social issues: Case management following to arrange disposition - likely transfer to [facility]  7   Precautions: Routine q15min checks, vitals qshift    Heather Huggins MD  06/28/19

## 2019-06-29 RX ADMIN — LEVOTHYROXINE SODIUM 125 MCG: 125 TABLET ORAL at 05:28

## 2019-06-29 RX ADMIN — THEOPHYLLINE ANHYDROUS 200 MG: 200 CAPSULE, EXTENDED RELEASE ORAL at 08:20

## 2019-06-29 RX ADMIN — PANTOPRAZOLE SODIUM 40 MG: 40 TABLET, DELAYED RELEASE ORAL at 05:28

## 2019-06-29 RX ADMIN — PALIPERIDONE 12 MG: 6 TABLET, EXTENDED RELEASE ORAL at 08:19

## 2019-06-29 RX ADMIN — FLUTICASONE FUROATE AND VILANTEROL TRIFENATATE 1 PUFF: 200; 25 POWDER RESPIRATORY (INHALATION) at 08:19

## 2019-06-29 RX ADMIN — QUETIAPINE FUMARATE 100 MG: 50 TABLET, EXTENDED RELEASE ORAL at 21:33

## 2019-06-29 NOTE — PROGRESS NOTES
Psychiatry Progress Note    Subjective: Interval History     The patient is lying in bed this morning with oxygen on  She continues to be paranoid  She is medication and meal compliant  Patient states the only reason she is still in the hospital is because she does not have anywhere to live  Patient has poor insight into her mental health  She continues to be somatic  She rings her call bell frequently  She denies any suicidal ideation      Behavior over the last 24 hours:  unchanged  Sleep: normal  Appetite: normal  Medication side effects: No  ROS: no complaints    Current medications:    Current Facility-Administered Medications:     acetaminophen (TYLENOL) tablet 650 mg, 650 mg, Oral, Q6H PRN, Zeeshan Taylor MD    albuterol (PROVENTIL HFA,VENTOLIN HFA) inhaler 2 puff, 2 puff, Inhalation, Q4H PRN, ABILIO Clarke    aluminum-magnesium hydroxide-simethicone (MYLANTA) 200-200-20 mg/5 mL oral suspension 15 mL, 15 mL, Oral, Q4H PRN, Pretty Lopez MD, 15 mL at 05/12/19 2221    ammonium lactate (LAC-HYDRIN) 12 % lotion, , Topical, BID PRN, ABILIO Clarke, 1 application at 91/30/46 1114    benzonatate (TESSALON PERLES) capsule 100 mg, 100 mg, Oral, TID PRN, Zeeshan Taylor MD, 100 mg at 06/27/19 0928    benztropine (COGENTIN) injection 1 mg, 1 mg, Intramuscular, Q8H PRN, Pretty Lopez MD    benztropine (COGENTIN) tablet 1 mg, 1 mg, Oral, Q8H PRN, Pretty Lopez MD    enoxaparin (LOVENOX) subcutaneous injection 40 mg, 40 mg, Subcutaneous, Daily, Zeeshan Taylor MD    fluticasone-vilanterol (BREO ELLIPTA) 200-25 MCG/INH inhaler 1 puff, 1 puff, Inhalation, Daily, Jane Boogie MD, 1 puff at 06/28/19 0838    haloperidol (HALDOL) oral concentrated solution 1 mg, 1 mg, Oral, Q6H PRN, Chelle Heredia MD    haloperidol lactate (HALDOL) injection 2 mg, 2 mg, Intramuscular, Q6H PRN, Chelle Heredia MD    hydrOXYzine HCL (ATARAX) tablet 25 mg, 25 mg, Oral, Q6H PRN, Pretty Lopez MD, 25 mg at 05/29/19 1326    levothyroxine tablet 125 mcg, 125 mcg, Oral, Early Morning, Kiel Marrero MD, 125 mcg at 06/29/19 0528    magnesium hydroxide (MILK OF MAGNESIA) 400 mg/5 mL oral suspension 30 mL, 30 mL, Oral, Daily PRN, Madeline Witt MD    paliperidone (INVEGA) 24 hr tablet 12 mg, 12 mg, Oral, Daily, 12 mg at 06/28/19 0834 **OR** OLANZapine (ZyPREXA) IM injection 10 mg, 10 mg, Intramuscular, Daily, ABILIO Vizcarra    QUEtiapine (SEROquel XR) 24 hr tablet 100 mg, 100 mg, Oral, HS, 100 mg at 06/28/19 2129 **OR** OLANZapine (ZyPREXA) IM injection 10 mg, 10 mg, Intramuscular, HS, ABILIO Vizcarra    pantoprazole (PROTONIX) EC tablet 40 mg, 40 mg, Oral, Early Morning, Kiel Marrero MD, 40 mg at 06/29/19 7661    polyethylene glycol (MIRALAX) packet 17 g, 17 g, Oral, Daily PRN, ABILIO Vizcarra    risperiDONE (RisperDAL M-TABS) dispersible tablet 1 mg, 1 mg, Oral, Q8H PRN, Jill Gu MD    theophylline (JEF-24) 24 hr capsule 200 mg, 200 mg, Oral, Daily, Kiel Marrero MD, 200 mg at 06/28/19 0834    traZODone (DESYREL) tablet 25 mg, 25 mg, Oral, HS PRN, Kiel Marrero MD    tuberculin injection 5 Units, 5 Units, Intradermal, Once, Dat Zeng MD, Stopped at 05/22/19 1425    Current Problem List:    Patient Active Problem List   Diagnosis    [facility]epsis [facility] [facility]    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 [facility]    Tobacco use disorder, continuous    Bipolar disorder ([facility] [address] [facility]    Left hip pain    Lactic acidosis    Hypokalemia    Hypomagnesemia    Compression fracture of L4 lumbar vertebra    Thoracic compression fracture (HCC)    Ventral hernia    Parapneumonic effusion    Acute on chronic respiratory failure with hypoxia (HCC)    Chronic respiratory failure (HCC)    Hypophosphatemia    Elevated MCV    Compression deformity of vertebra    Schizoaffective disorder, bipolar type (HCC)    Acquired hypothyroidism    Gastroesophageal reflux disease without esophagitis    Abnormal CT of the chest    Excessive cerumen in left ear canal    Lipoma of right upper extremity    Polydipsia    Localized swelling of both lower legs       Problem list reviewed 06/29/19     Objective:     Vital Signs:  Vitals:    06/28/19 0657 06/28/19 1454 06/28/19 2100 06/29/19 0701   BP: 101/72 95/63 110/58 115/61   BP Location: Right arm Left arm  Left arm   Pulse: 81 87 94 73   Resp: 20 18 18 16   Temp: (!) 97 4 °F (36 3 °C) 98 °F (36 7 °C) 98 2 °F (36 8 °C) 97 7 °F (36 5 °C)   TempSrc: Tympanic Temporal Temporal Temporal   SpO2: 97% 97% 98% 96%   Weight:       Height:             Appearance:  age appropriate and casually dressed   Behavior:  guarded   Speech:  normal volume   Mood:  anxious   Affect:  constricted   Thought Process:  circumstantial   Thought Content:  delusions  persecutory   Perceptual Disturbances: None   Risk Potential: none   Sensorium:  person, place, situation and time   Cognition:  intact   Consciousness:  alert and awake    Attention: attention span and concentration were age appropriate   Intellect: average   Insight:  poor   Judgment: poor      Motor Activity: no abnormal movements       I/O Past 24 hours:  I/O last 3 completed shifts: In: 80 [P O :780]  Out: -   I/O this shift:  In: 380 [P O :380]  Out: -         Labs:  Reviewed 06/29/19    Progress Toward Goals:  unchanged    Assessment / Plan:     Schizoaffective disorder, bipolar type ([facility] [address] )    Recommended Treatment:      Medication changes:  1) continue current medication regimen  Non-pharmacological treatments  1) Continue with group therapy, milieu therapy and occupational therapy  Safety  1) Safety/communication plan established targeting dynamic risk factors above  2) Risks, benefits, and possible side effects of medications explained to patient and patient verbalizes understanding  Counseling / Coordination of Care    Total floor / unit time spent today 20 minutes   Greater than 50% of total time was spent with the patient and / or family counseling and / or coordination of care  A description of the counseling / coordination of care  Patient's Rights, confidentiality and exceptions to confidentiality, use of automated medical record, [facility] staff access to medical record, and consent to treatment reviewed      Pablito Sandy PA-C

## 2019-06-29 NOTE — PLAN OF CARE
Problem: Alteration in Thoughts and Perception  Goal: Agree to be compliant with medication regime, as prescribed and report medication side effects  Description  Interventions:  - Offer appropriate PRN medication and supervise ingestion; conduct aims, as needed   Outcome: Progressing  Goal: Complete daily ADLs, including personal hygiene independently, as able  Description  Interventions:  - Observe, teach, and assist patient with ADLS  - Monitor and promote a balance of rest/activity, with adequate nutrition and elimination   Outcome: Progressing     Problem: Risk for Self Injury/Neglect  Goal: Refrain from harming self  Description  Interventions:  - Monitor patient closely, per order  - Develop a trusting relationship  - Supervise medication ingestion, monitor effects and side effects   Outcome: Progressing     Problem: Depression  Goal: Refrain from harming self  Description  Interventions:  - Monitor patient closely, per order   - Supervise medication ingestion, monitor effects and side effects   Outcome: Progressing  Goal: Refrain from self-neglect  Outcome: Progressing     Problem: Anxiety  Goal: Anxiety is at manageable level  Description  Interventions:  - Assess and monitor patient's anxiety level  - Monitor for signs and symptoms of anxiety both physical and emotional (heart palpitations, chest pain, shortness of breath, headaches, nausea, feeling jumpy, restlessness, irritable, apprehensive)  - Collaborate with interdisciplinary team and initiate plan and interventions as ordered    - [place] patient to unit/surroundings  - Explain treatment plan  - Encourage participation in care  - Encourage verbalization of concerns/fears  - Identify coping mechanisms  - Assist in developing anxiety-reducing skills  - Administer/offer alternative therapies  - Limit or eliminate stimulants  Outcome: Progressing

## 2019-06-29 NOTE — PROGRESS NOTES
Pt isolative to herself in room  Comes out to day room for meals  Pt yelling this morning about getting her oxygen concentrator  Pt states she needs this to "move around"  Pt given the oxygen concentrator and remained lying in bed  Med and meal compliant

## 2019-06-29 NOTE — PROGRESS NOTES
Patient remains isolative to her room comes out for meals and snacks  Behavior appropriate  No issues with call bell  Patient compliant with medications  Denied any needs  Appeared to be less somatic  Will continue to monitor on q 7 minute checks

## 2019-06-30 RX ADMIN — HYDROXYZINE HYDROCHLORIDE 25 MG: 25 TABLET ORAL at 14:17

## 2019-06-30 RX ADMIN — THEOPHYLLINE ANHYDROUS 200 MG: 200 CAPSULE, EXTENDED RELEASE ORAL at 08:41

## 2019-06-30 RX ADMIN — LEVOTHYROXINE SODIUM 125 MCG: 125 TABLET ORAL at 06:06

## 2019-06-30 RX ADMIN — PALIPERIDONE 12 MG: 6 TABLET, EXTENDED RELEASE ORAL at 08:41

## 2019-06-30 RX ADMIN — QUETIAPINE FUMARATE 100 MG: 50 TABLET, EXTENDED RELEASE ORAL at 21:10

## 2019-06-30 RX ADMIN — FLUTICASONE FUROATE AND VILANTEROL TRIFENATATE 1 PUFF: 200; 25 POWDER RESPIRATORY (INHALATION) at 08:40

## 2019-06-30 RX ADMIN — PANTOPRAZOLE SODIUM 40 MG: 40 TABLET, DELAYED RELEASE ORAL at 06:06

## 2019-06-30 NOTE — PROGRESS NOTES
Patient is in her bed  Patient appears to be sleeping  No distress noted  Eyes closed  Patient laying quietly  Will continue on q 7 minute checks

## 2019-06-30 NOTE — PLAN OF CARE
Problem: Alteration in Thoughts and Perception  Goal: Treatment Goal: Gain control of psychotic behaviors/thinking, reduce/eliminate presenting symptoms and demonstrate improved reality functioning upon discharge  Outcome: Progressing  Goal: Verbalize thoughts and feelings  Description  Interventions:  - Promote a nonjudgmental and trusting relationship with the patient through active listening and therapeutic communication  - Assess patient's level of functioning, behavior and potential for risk  - Engage patient in 1 on 1 interactions for a minimum of 15 minutes each session  - Encourage patient to express fears, feelings, frustrations, and discuss symptoms    - [place] patient to reality, help patient recognize reality-based thinking   - Administer medications as ordered and assess for potential side effects  - Provide the patient education related to the signs and symptoms of the illness and desired effects of prescribed medications  Outcome: Progressing  Goal: Refrain from acting on delusional thinking/internal stimuli  Description  Interventions:  - Monitor patient closely, per order   - Utilize least restrictive measures   - Set reasonable limits, give positive feedback for acceptable   - Administer medications as ordered and monitor of potential side effects  Outcome: Progressing  Goal: Agree to be compliant with medication regime, as prescribed and report medication side effects  Description  Interventions:  - Offer appropriate PRN medication and supervise ingestion; conduct aims, as needed   Outcome: Progressing  Goal: Attend and participate in unit activities, including therapeutic, recreational, and educational groups  Description  Interventions:  - Provide therapeutic and educational activities daily, encourage attendance and participation, and document same in the medical record   Outcome: Progressing  Goal: Recognize dysfunctional thoughts, communicate reality-based thoughts at the time of discharge  Description  Interventions:  - Provide medication and psycho-education to assist patient in compliance and developing insight into his/her illness   Outcome: Progressing  Goal: Complete daily ADLs, including personal hygiene independently, as able  Description  Interventions:  - Observe, teach, and assist patient with ADLS  - Monitor and promote a balance of rest/activity, with adequate nutrition and elimination   Outcome: Progressing     Problem: Ineffective Coping  Goal: Identifies ineffective coping skills  Outcome: Progressing  Goal: Identifies healthy coping skills  Outcome: Progressing  Goal: Demonstrates healthy coping skills  Outcome: Progressing  Goal: Participates in unit activities  Description  Interventions:  - Provide therapeutic environment   - Provide required programming   - Redirect inappropriate behaviors   Outcome: Progressing  Goal: Patient/Family participate in treatment and DC plans  Description  Interventions:  - Provide therapeutic environment  Outcome: Progressing  Goal: Patient/Family verbalizes awareness of resources  Outcome: Progressing  Goal: Understands least restrictive measures  Description  Interventions:  - Utilize least restrictive behavior  Outcome: Progressing  Goal: Free from restraint events  Description  - Utilize least restrictive measures   - Provide behavioral interventions   - Redirect inappropriate behaviors   Outcome: Progressing     Problem: Risk for Self Injury/Neglect  Goal: Treatment Goal: Remain safe during length of stay, learn and adopt new coping skills, and be free of self-injurious ideation, impulses and acts at the time of discharge  Outcome: Progressing  Goal: Verbalize thoughts and feelings  Description  Interventions:  - Assess and re-assess patient's lethality and potential for self-injury  - Engage patient in 1:1 interactions, daily, for a minimum of 15 minutes  - Encourage patient to express feelings, fears, frustrations, hopes  - Establish rapport/trust with patient   Outcome: Progressing  Goal: Refrain from harming self  Description  Interventions:  - Monitor patient closely, per order  - Develop a trusting relationship  - Supervise medication ingestion, monitor effects and side effects   Outcome: Progressing  Goal: Attend and participate in unit activities, including therapeutic, recreational, and educational groups  Description  Interventions:  - Provide therapeutic and educational activities daily, encourage attendance and participation, and document same in the medical record  - Obtain collateral information, encourage visitation and family involvement in care   Outcome: Progressing  Goal: Recognize maladaptive responses and adopt new coping mechanisms  Outcome: Progressing  Goal: Complete daily ADLs, including personal hygiene independently, as able  Description  Interventions:  - Observe, teach, and assist patient with ADLS  - Monitor and promote a balance of rest/activity, with adequate nutrition and elimination  Outcome: Progressing     Problem: Depression  Goal: Treatment Goal: Demonstrate behavioral control of depressive symptoms, verbalize feelings of improved mood/affect, and adopt new coping skills prior to discharge  Outcome: Progressing  Goal: Verbalize thoughts and feelings  Description  Interventions:  - Assess and re-assess patient's level of risk   - Engage patient in 1:1 interactions, daily, for a minimum of 15 minutes   - Encourage patient to express feelings, fears, frustrations, hopes   Outcome: Progressing  Goal: Refrain from harming self  Description  Interventions:  - Monitor patient closely, per order   - Supervise medication ingestion, monitor effects and side effects   Outcome: Progressing  Goal: Refrain from self-neglect  Outcome: Progressing  Goal: Attend and participate in unit activities, including therapeutic, recreational, and educational groups  Description  Interventions:  - Provide therapeutic and educational activities daily, encourage attendance and participation, and document same in the medical record   Outcome: Progressing  Goal: Complete daily ADLs, including personal hygiene independently, as able  Description  Interventions:  - Observe, teach, and assist patient with ADLS  -  Monitor and promote a balance of rest/activity, with adequate nutrition and elimination   Outcome: Progressing     Problem: Anxiety  Goal: Anxiety is at manageable level  Description  Interventions:  - Assess and monitor patient's anxiety level  - Monitor for signs and symptoms of anxiety both physical and emotional (heart palpitations, chest pain, shortness of breath, headaches, nausea, feeling jumpy, restlessness, irritable, apprehensive)  - Collaborate with interdisciplinary team and initiate plan and interventions as ordered    - [place] patient to unit/surroundings  - Explain treatment plan  - Encourage participation in care  - Encourage verbalization of concerns/fears  - Identify coping mechanisms  - Assist in developing anxiety-reducing skills  - Administer/offer alternative therapies  - Limit or eliminate stimulants  Outcome: Progressing     Problem: DISCHARGE PLANNING - CARE MANAGEMENT  Goal: Discharge to post-acute care or home with appropriate resources  Description  INTERVENTIONS:  - Conduct assessment to determine patient/family and health care team treatment goals, and need for post-acute services based on payer coverage, community resources, and patient preferences, and barriers to discharge  - Address psychosocial, clinical, and financial barriers to discharge as identified in assessment in conjunction with the patient/family and health care team  - Arrange appropriate level of post-acute services according to patients   needs and preference and payer coverage in collaboration with the physician and health care team  - Communicate with and update the patient/family, physician, and health care team regarding progress on the discharge plan  - Arrange appropriate transportation to post-acute venues  Outcome: Progressing     Problem: Prexisting or High Potential for Compromised Skin Integrity  Goal: Skin integrity is maintained or improved  Description  INTERVENTIONS:  - Identify patients at risk for skin breakdown  - Assess and monitor skin integrity  - Assess and monitor nutrition and hydration status  - Monitor labs (i e  albumin)  - Assess for incontinence   - Turn and reposition patient  - Assist with mobility/ambulation  - Relieve pressure over bony prominences  - Avoid friction and shearing  - Provide appropriate hygiene as needed including keeping skin clean and dry  - Evaluate need for skin moisturizer/barrier cream  - Collaborate with interdisciplinary team (i e  Nutrition, Rehabilitation, etc )   - Patient/family teaching  Outcome: Progressing     Problem: Depression  Goal: Refrain from isolation  Description  Interventions:  - Develop a trusting relationship   - Encourage socialization   Outcome: Not Progressing

## 2019-06-30 NOTE — PROGRESS NOTES
Psychiatry Progress Note    Subjective: Interval History     The patient is visible on the unit this morning  She states she is worried she has pneumonia  Patient states last night she felt short of breath  Patient denies that this morning and states she is not coughing  Patient states she feels okay today  Patient continues to be somatic at times  Patient also remains paranoid and suspicious  She asked yesterday if someone poisoned her  Her thoughts are disorganized  Insight is poor  She is medication and meal compliant      Behavior over the last 24 hours:  unchanged  Sleep: normal  Appetite: normal  Medication side effects: No  ROS: no complaints    Current medications:    Current Facility-Administered Medications:     acetaminophen (TYLENOL) tablet 650 mg, 650 mg, Oral, Q6H PRN, Ansley Baker MD    albuterol (PROVENTIL HFA,VENTOLIN HFA) inhaler 2 puff, 2 puff, Inhalation, Q4H PRN, ABILIO Barroso    aluminum-magnesium hydroxide-simethicone (MYLANTA) 200-200-20 mg/5 mL oral suspension 15 mL, 15 mL, Oral, Q4H PRN, Efren Montana MD, 15 mL at 05/12/19 2221    ammonium lactate (LAC-HYDRIN) 12 % lotion, , Topical, BID PRN, ABILIO Barroso, 1 application at 58/75/92 1114    benzonatate (TESSALON PERLES) capsule 100 mg, 100 mg, Oral, TID PRN, Ansley Baker MD, 100 mg at 06/27/19 0928    benztropine (COGENTIN) injection 1 mg, 1 mg, Intramuscular, Q8H PRN, Efren Montana MD    benztropine (COGENTIN) tablet 1 mg, 1 mg, Oral, Q8H PRN, Efren Montana MD    enoxaparin (LOVENOX) subcutaneous injection 40 mg, 40 mg, Subcutaneous, Daily, Ansley Baker MD    fluticasone-vilanterol (BREO ELLIPTA) 200-25 MCG/INH inhaler 1 puff, 1 puff, Inhalation, Daily, Benny Spatz, MD, 1 puff at 06/30/19 0840    haloperidol (HALDOL) oral concentrated solution 1 mg, 1 mg, Oral, Q6H PRN, Christin Toney MD    haloperidol lactate (HALDOL) injection 2 mg, 2 mg, Intramuscular, Q6H PRN, Christin Toney MD    hydrOXYzine HCL (ATARAX) tablet 25 mg, 25 mg, Oral, Q6H PRN, Rosa Arroyo MD, 25 mg at 05/29/19 1326    levothyroxine tablet 125 mcg, 125 mcg, Oral, Early Morning, Hollie Rubin MD, 125 mcg at 06/30/19 0606    magnesium hydroxide (MILK OF MAGNESIA) 400 mg/5 mL oral suspension 30 mL, 30 mL, Oral, Daily PRN, Uday Lewis MD    paliperidone (INVEGA) 24 hr tablet 12 mg, 12 mg, Oral, Daily, 12 mg at 06/30/19 0841 **OR** OLANZapine (ZyPREXA) IM injection 10 mg, 10 mg, Intramuscular, Daily, [place] Brenden, CRNP    QUEtiapine (SEROquel XR) 24 hr tablet 100 mg, 100 mg, Oral, HS, 100 mg at 06/29/19 2133 **OR** OLANZapine (ZyPREXA) IM injection 10 mg, 10 mg, Intramuscular, HS, [place] Brenden, ABILIO    pantoprazole (PROTONIX) EC tablet 40 mg, 40 mg, Oral, Early Morning, Hollie Rubin MD, 40 mg at 06/30/19 0606    polyethylene glycol (MIRALAX) packet 17 g, 17 g, Oral, Daily PRN, ABILIO Nunes    risperiDONE (RisperDAL M-TABS) dispersible tablet 1 mg, 1 mg, Oral, Q8H PRN, Rosa Arroyo MD    theophylline (JEF-24) 24 hr capsule 200 mg, 200 mg, Oral, Daily, Hollie Rubin MD, 200 mg at 06/30/19 0841    traZODone (DESYREL) tablet 25 mg, 25 mg, Oral, HS PRN, Hollie Rubin MD    tuberculin injection 5 Units, 5 Units, Intradermal, Once, Rhiannon Mcmillan MD, Stopped at 05/22/19 1425    Current Problem List:    Patient Active Problem List   Diagnosis    Sepsis ([facility]    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[facility]    Tobacco use disorder, continuous    Bipolar disorder ([facility]    Left hip pain    Lactic acidosis    Hypokalemia    Hypomagnesemia    Compression fracture of L4 lumbar vertebra    Thoracic compression fracture (HCC)    Ventral hernia    Parapneumonic effusion    Acute on chronic respiratory failure with hypoxia (HCC)    Chronic respiratory failure (HCC)    Hypophosphatemia    Elevated MCV    Compression deformity of vertebra    Schizoaffective disorder, bipolar type ([facility] )    Acquired hypothyroidism    Gastroesophageal reflux disease without esophagitis    Abnormal CT of the chest    Excessive cerumen in left ear canal    Lipoma of right upper extremity    Polydipsia    Localized swelling of both lower legs       Problem list reviewed 06/30/19     Objective:     Vital Signs:  Vitals:    06/29/19 0701 06/29/19 1508 06/29/19 2118 06/30/19 0706   BP: 115/61 101/59 123/58 112/58   BP Location: Left arm Right arm Right arm Right arm   Pulse: 73 83 72 79   Resp: 16 17 18 17   Temp: 97 7 °F (36 5 °C) 97 8 °F (36 6 °C) 98 5 °F (36 9 °C) 97 8 °F (36 6 °C)   TempSrc: Temporal Temporal Temporal Temporal   SpO2: 96% 95% 97% 97%   Weight:       Height:             Appearance:  age appropriate and casually dressed   Behavior:  guarded   Speech:  normal volume   Mood:  anxious   Affect:  constricted   Thought Process:  circumstantial   Thought Content:  delusions  persecutory   Perceptual Disturbances: None   Risk Potential: none   Sensorium:  person, place, situation and time   Cognition:  intact   Consciousness:  alert and awake    Attention: attention span and concentration were age appropriate   Intellect: average   Insight:  poor   Judgment: poor      Motor Activity: no abnormal movements       I/O Past 24 hours:  I/O last 3 completed shifts: In: 1280 [P O :1280]  Out: -   I/O this shift:  In: 240 [P O :240]  Out: -         Labs:  Reviewed 06/30/19    Progress Toward Goals:  unchanged    Assessment / Plan:     Sch[facility]oaffective disorder, [facility] type ([facility] )    Recommended Treatment:      Medication changes:  1) continue current medication regimen  Non-pharmacological treatments  1) Continue with group therapy, milieu therapy and occupational therapy  Safety  1) Safety/communication plan established targeting dynamic risk factors above  2) Risks, benefits, and possible side effects of medications explained to patient and patient verbalizes understanding        Counseling / Coordination of Care    Total floor / unit time spent today 20 minutes  Greater than 50% of total time was spent with the patient and / or family counseling and / or coordination of care  A description of the counseling / coordination of care  Patient's Rights, confidentiality and exceptions to confidentiality, use of automated medical record, Jeb [facility] staff access to medical record, and consent to treatment reviewed      Jerry Sarmiento PA-C

## 2019-06-30 NOTE — NURSING NOTE
Pt demanding, irritable and somatic  Requested that writer obtain pulse ox because she felt SOB  Stated, " do you think someone poisoned me? Why am I having such a hard time breathing?" Pulse ox completed and result was 96%  Repeatedly reassured pt that she was fine and she stated, "I really don't think so"  Support provided  Pt seclusive to her room most of the evening and required limits which she had a difficult time adhering to  Stated, "I may not be going to [facility]  I might live with my parents or my sister might get me a bed at [facility]"  Insight is poor  Needed reminders not to turn up O2  Will continue to monitor and maintain q 7 minute checks

## 2019-06-30 NOTE — PROGRESS NOTES
Patient is alert and oriented times 4  Is pleasant and cooperative with assessment  Is visual for breakfast only  Remains somatic specially regards to her meals being poisoned  Denies cough or any chest discomfort this time  Patient denies SI/HI or hallucinations Is medication and meals compliant  Patient had nurse counseling and was encouraged for socialization and meals compliance  Will continue to monitor for safety and support

## 2019-07-01 PROCEDURE — 99232 SBSQ HOSP IP/OBS MODERATE 35: CPT | Performed by: NURSE PRACTITIONER

## 2019-07-01 RX ORDER — BENZTROPINE MESYLATE 1 MG/ML
1 INJECTION INTRAMUSCULAR; INTRAVENOUS EVERY 8 HOURS PRN
Status: DISCONTINUED | OUTPATIENT
Start: 2019-07-01 | End: 2019-07-23 | Stop reason: HOSPADM

## 2019-07-01 RX ADMIN — PALIPERIDONE 12 MG: 6 TABLET, EXTENDED RELEASE ORAL at 09:34

## 2019-07-01 RX ADMIN — THEOPHYLLINE ANHYDROUS 200 MG: 200 CAPSULE, EXTENDED RELEASE ORAL at 09:34

## 2019-07-01 RX ADMIN — PANTOPRAZOLE SODIUM 40 MG: 40 TABLET, DELAYED RELEASE ORAL at 05:32

## 2019-07-01 RX ADMIN — FLUTICASONE FUROATE AND VILANTEROL TRIFENATATE 1 PUFF: 200; 25 POWDER RESPIRATORY (INHALATION) at 09:32

## 2019-07-01 RX ADMIN — LEVOTHYROXINE SODIUM 125 MCG: 125 TABLET ORAL at 05:32

## 2019-07-01 RX ADMIN — QUETIAPINE FUMARATE 100 MG: 50 TABLET, EXTENDED RELEASE ORAL at 21:42

## 2019-07-01 NOTE — PROGRESS NOTES
07/01/19 1000   Activity/Group Checklist   Group Other (Comment)  ("Greet the day" group)   Attendance Did not attend   Interactions Did not interact   Affect/Mood Other (Comment)  (less receptive to engaging in morning groups )

## 2019-07-01 NOTE — PROGRESS NOTES
Patient remains isolative to her room  She continues to call out from her room and continues on the call bell  She remains paranoid and has been somatic with multiple issues  Patient shows no signs of distress or discomfort  2 liters of o2  Vitals stable  Will continue to monitor on q 7 minute checks

## 2019-07-01 NOTE — PROGRESS NOTES
Progress Note - Behavioral Health   Niyah Ortiz 60 y o  female MRN: [id number]  Unit/Bed#: [facility]al[facility] Watkins 457-59 Encounter: [id number]    The patient was seen for continuing care and reviewed with treatment team  Staff reports that the patient remains somatically preoccupied, self isolating to room, paranoid, and focused on health and medication concerns  During assessment the patient reports that "this chip will never leave my arm " Continues to report somatic complaints regarding oxygen health and ambulation  Remains compliant with her medication  Denies any auditory or visual hallucinations  Remains paranoid, delusional, and irritable  Patient continues to be staff splitting and manipulative regarding medications and treatment plan  Will continue to offer support and encouragement to follow treatment plan and remain compliant with medications  Patient is currently refusing groups, but appetite and sleep remain adequate  Continue to await for a bed on EAC  No medication changes at this time      Mental Status Evaluation:  Appearance:  Marginal/poor hygiene   Behavior:  calm and cooperative; irritable at times   Fund of knowledge  aware of current events and Aware of past history   Speech:   Language: wnl  No overt abnormality   Mood:  irritable and anxious   Affect:   Associations: labile  intact   Thought Process:  Circumstantial and Tangential   Thought Content:  Paranoid and mistrustful, Delusions of persecution, Grandiose delusions, Obsessive ruminations and is somatically preoccupied   Perceptual Disturbances: Denies hallucinations and does not appear to be responding to internal stimuli   Risk Potential: No suicidal or homicidal ideation   Orientation  Oriented x 3   Memory Not tested   Attention/Concentration attention span appeared shorter than expected for age   Insight:  No insight   Judgment: Poor judgment   Gait/Station: normal gait/station and normal balance   Motor Activity: No abnormal movement noted Progress Toward Goals: slightly improved    Assessment/Plan    Principal Problem:    Schizoaffective disorder, bipolar type (HCC)  Active Problems:    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[facility] [address] )    Acquired hypothyroidism    Gastroesophageal reflux disease without esophagitis      Recommended Treatment: Continue with pharmacotherapy, group therapy, milieu therapy and occupational therapy    The patient will be maintained on the following medications:    Current Facility-Administered Medications:  acetaminophen 650 mg Oral Q6H PRN Ansley Baker MD   albuterol 2 puff Inhalation Q4H PRN Isidor Marrow, CRNP   aluminum-magnesium hydroxide-simethicone 15 mL Oral Q4H PRN Efren Montana MD   ammonium lactate  Topical BID PRN Isidor Marrow, CRNP   benzonatate 100 mg Oral TID PRN Ansley Baker MD   benztropine 1 mg Intramuscular Q8H PRN Efren Montana MD   benztropine 1 mg Oral Q8H PRN Efren Montana MD   enoxaparin 40 mg Subcutaneous Daily Ansley Baker MD   fluticasone-vilanterol 1 puff Inhalation Daily Benny Spatz, MD   haloperidol 1 mg Oral Q6H PRN Christin Toney MD   haloperidol lactate 2 mg Intramuscular Q6H PRN Christin Toney MD   hydrOXYzine HCL 25 mg Oral Q6H PRN Efren Montana MD   levothyroxine 125 mcg Oral Early Morning Ansley Baker MD   magnesium hydroxide 30 mL Oral Daily PRN Jesse Camilo MD   paliperidone 12 mg Oral Daily Isidor Marrow, CRNP   Or       OLANZapine 10 mg Intramuscular Daily Isidor Marrow, CRNP   QUEtiapine 100 mg Oral HS Isidor Marrow, CRNP   Or       OLANZapine 10 mg Intramuscular HS Isidor Marrow, CRNP   pantoprazole 40 mg Oral Early Morning Ansley Baker MD   polyethylene glycol 17 g Oral Daily PRN Isidor Marrow, CRNP   risperiDONE 1 mg Oral Q8H PRN Efren Montana MD   theophylline 200 mg Oral Daily Ansley Baker MD   traZODone 25 mg Oral HS PRN Ansley Baker MD   tuberculin 5 Units Intradermal Once Christin Toney MD       Risks, benefits and possible side effects of Medications: Risks, benefits, and possible side effects of medications explained to patient and patient verbalizes understanding

## 2019-07-01 NOTE — PROGRESS NOTES
Patient is noted to be resting in bed with eyes closed  Patient remians on 2 liters of O2  No s/s of respiratory distress noted  Will continue to monitor patient

## 2019-07-01 NOTE — PROGRESS NOTES
Pt with poor appetite, resistive but med-compliant except lovenox  VSS  Pox 96 with O2 at 2 liters/min via nc  Pt needy and somatic but redirectable  Denied SI  Refused groups  Monitored for safety and support

## 2019-07-01 NOTE — PROGRESS NOTES
07/01/19 0900   Team Meeting   Meeting Type Daily Rounds   Team Members Present   Team Members Present Physician;Nurse;;Occupational Therapist   Physician Team Member Dr Jeanmarie Lazo Team Member Dickson Stewart, Donato Cage [facility] Team Member Tiffanie Duran    OT Team Member Matt Mijares      Pt discussed at treatment rounds today, no medication changes made

## 2019-07-02 PROCEDURE — 99231 SBSQ HOSP IP/OBS SF/LOW 25: CPT | Performed by: NURSE PRACTITIONER

## 2019-07-02 RX ORDER — QUETIAPINE 150 MG/1
150 TABLET, FILM COATED, EXTENDED RELEASE ORAL
Status: DISCONTINUED | OUTPATIENT
Start: 2019-07-02 | End: 2019-07-12

## 2019-07-02 RX ORDER — OLANZAPINE 10 MG/1
10 INJECTION, POWDER, LYOPHILIZED, FOR SOLUTION INTRAMUSCULAR
Status: DISCONTINUED | OUTPATIENT
Start: 2019-07-02 | End: 2019-07-12

## 2019-07-02 RX ADMIN — PALIPERIDONE 12 MG: 6 TABLET, EXTENDED RELEASE ORAL at 08:50

## 2019-07-02 RX ADMIN — LEVOTHYROXINE SODIUM 125 MCG: 125 TABLET ORAL at 05:46

## 2019-07-02 RX ADMIN — FLUTICASONE FUROATE AND VILANTEROL TRIFENATATE 1 PUFF: 200; 25 POWDER RESPIRATORY (INHALATION) at 08:48

## 2019-07-02 RX ADMIN — QUETIAPINE FUMARATE 150 MG: 150 TABLET, EXTENDED RELEASE ORAL at 21:53

## 2019-07-02 RX ADMIN — PANTOPRAZOLE SODIUM 40 MG: 40 TABLET, DELAYED RELEASE ORAL at 05:46

## 2019-07-02 RX ADMIN — THEOPHYLLINE ANHYDROUS 200 MG: 200 CAPSULE, EXTENDED RELEASE ORAL at 08:50

## 2019-07-02 NOTE — PROGRESS NOTES
Pt attention-seeking and somatic at times but redirectable  VSS  Pt isolative between meals  Denied SI   Med-compliant  Good appetite and steady gait  Monitored for safety and support

## 2019-07-02 NOTE — PROGRESS NOTES
Pt calm and cooperative  Pt on 2L O2 continuous, and appears fixated on O2 therapy  VSS  Pt somatic and demanding when negotiating needs    Pt complaint with meds

## 2019-07-02 NOTE — CASE MANAGEMENT
Patient approved for admission to [facility], awaiting bed for patient to be transferred down to   will continue to follow and provide services as needed

## 2019-07-02 NOTE — PROGRESS NOTES
Progress Note - Behavioral Health   Hamzah Burger 60 y o  female MRN: [id number]  Unit/Bed#: Nayana Sánchez 690-07 Encounter: [id number]    The patient was seen for continuing care and reviewed with treatment team  Staff reports that the patient continues to remains self isolating to her room, attention seeking, somatic, and demanding  Discussed with the treatment team that a behavioral plan will be initiated for the patient  During assessment the patient continues to be somatic, intrusive, delusional, sexually preoccupied, and selectively guarded with issues  Remains compliant with medications with encouragement  Denies any thoughts to hurt herself or others  Denies any auditory or visual hallucinations, but appears preoccupied with ruminating thoughts and ideas of persecution  No adverse side effects to medications are witnessed by staff, however, the patient remains somatic regarding her treatment plan and needs  Reports that she is sleeping and eating appropriately  Will continue to await for EAC placement  Patient is currently approved for admission  Will continue to titrate Seroquel XR for symptom management       Mental Status Evaluation:  Appearance:  Marginal/poor hygiene   Behavior:  cooperative, guarded, Superficial and Dismissive   Fund of knowledge  aware of current events and Aware of past history   Speech:   Language: Normal rate and Normal volume  No overt abnormality   Mood:  irritable and anxious   Affect:   Associations: blunted  Intact   Thought Process:  Circumstantial and disorganized   Thought Content:  Paranoid and mistrustful, Delusions of persecution, Grandiose delusions, Obsessive ruminations and is somatically preoccupied   Perceptual Disturbances: Uncertain   Risk Potential: No suicidal or homicidal ideation   Orientation  Oriented x 3   Memory No deficits   Attention/Concentration attention span appeared shorter than expected for age   Insight:  No insight   Judgment: Poor judgment Gait/Station: normal gait/station and normal balance   Motor Activity: No abnormal movement noted     Progress Toward Goals: unchanged    Assessment/Plan    Principal Problem:    Schizoaffective disorder, bipolar type (HCC)  Active Problems:    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[address] )    Acquired hypothyroidism    Gastroesophageal reflux disease without esophagitis      Recommended Treatment: Continue with pharmacotherapy, group therapy, milieu therapy and occupational therapy    The patient will be maintained on the following medications:    Current Facility-Administered Medications:  acetaminophen 650 mg Oral Q6H PRN Reji Duque MD   albuterol 2 puff Inhalation Q4H PRN ABILIO Field   aluminum-magnesium hydroxide-simethicone 15 mL Oral Q4H PRN Jarrett Espinoza MD   ammonium lactate  Topical BID PRN ABILIO Field   benzonatate 100 mg Oral TID PRN Reji Duque MD   benztropine 1 mg Intramuscular Q8H PRN Lynsey Duran MD   benztropine 1 mg Oral Q8H PRN Jarrett Espinoza MD   enoxaparin 40 mg Subcutaneous Daily Reji Duque MD   fluticasone-vilanterol 1 puff Inhalation Daily Garland Buck MD   haloperidol 1 mg Oral Q6H PRN Lynsey Duran MD   haloperidol lactate 2 mg Intramuscular Q6H PRN Lynsey Duran MD   hydrOXYzine HCL 25 mg Oral Q6H PRN Jarrett Espinoza MD   levothyroxine 125 mcg Oral Early Morning Reji Duque MD   magnesium hydroxide 30 mL Oral Daily PRN Galilea Hsu MD   paliperidone 12 mg Oral Daily ABILIO Field   Or       OLANZapine 10 mg Intramuscular Daily ABILIO Field   QUEtiapine 100 mg Oral HS BAILIO Field   Or       OLANZapine 10 mg Intramuscular HS ABILIO Field   pantoprazole 40 mg Oral Early Morning Reji Duque MD   polyethylene glycol 17 g Oral Daily PRN ABILIO Field   risperiDONE 1 mg Oral Q8H PRN Jarrett Espinoza MD   theophylline 200 mg Oral Daily Reji Duque MD   traZODone 25 mg Oral HS PRN Reji Duque MD   tuberculin 5 Units Intradermal Once Carlos Sampson MD       Risks, benefits and possible side effects of Medications:   Risks, benefits, and possible side effects of medications explained to patient and patient verbalizes understanding

## 2019-07-02 NOTE — PLAN OF CARE
Problem: Alteration in Thoughts and Perception  Goal: Treatment Goal: Gain control of psychotic behaviors/thinking, reduce/eliminate presenting symptoms and demonstrate improved reality functioning upon discharge  Outcome: Progressing  Goal: Verbalize thoughts and feelings  Description  Interventions:  - Promote a nonjudgmental and trusting relationship with the patient through active listening and therapeutic communication  - Assess patient's level of functioning, behavior and potential for risk  - Engage patient in 1 on 1 interactions for a minimum of 15 minutes each session  - Encourage patient to express fears, feelings, frustrations, and discuss symptoms    - [place] patient to reality, help patient recognize reality-based thinking   - Administer medications as ordered and assess for potential side effects  - Provide the patient education related to the signs and symptoms of the illness and desired effects of prescribed medications  Outcome: Progressing  Goal: Refrain from acting on delusional thinking/internal stimuli  Description  Interventions:  - Monitor patient closely, per order   - Utilize least restrictive measures   - Set reasonable limits, give positive feedback for acceptable   - Administer medications as ordered and monitor of potential side effects  Outcome: Progressing  Goal: Agree to be compliant with medication regime, as prescribed and report medication side effects  Description  Interventions:  - Offer appropriate PRN medication and supervise ingestion; conduct aims, as needed   Outcome: Progressing  Goal: Recognize dysfunctional thoughts, communicate reality-based thoughts at the time of discharge  Description  Interventions:  - Provide medication and psycho-education to assist patient in compliance and developing insight into his/her illness   Outcome: Progressing  Goal: Complete daily ADLs, including personal hygiene independently, as able  Description  Interventions:  - Observe, teach, and assist patient with ADLS  - Monitor and promote a balance of rest/activity, with adequate nutrition and elimination   Outcome: Progressing     Problem: Risk for Self Injury/Neglect  Goal: Treatment Goal: Remain safe during length of stay, learn and adopt new coping skills, and be free of self-injurious ideation, impulses and acts at the time of discharge  Outcome: Progressing  Goal: Verbalize thoughts and feelings  Description  Interventions:  - Assess and re-assess patient's lethality and potential for self-injury  - Engage patient in 1:1 interactions, daily, for a minimum of 15 minutes  - Encourage patient to express feelings, fears, frustrations, hopes  - Establish rapport/trust with patient   Outcome: Progressing  Goal: Refrain from harming self  Description  Interventions:  - Monitor patient closely, per order  - Develop a trusting relationship  - Supervise medication ingestion, monitor effects and side effects   Outcome: Progressing  Goal: Recognize maladaptive responses and adopt new coping mechanisms  Outcome: Progressing  Goal: Complete daily ADLs, including personal hygiene independently, as able  Description  Interventions:  - Observe, teach, and assist patient with ADLS  - Monitor and promote a balance of rest/activity, with adequate nutrition and elimination  Outcome: Progressing     Problem: Depression  Goal: Treatment Goal: Demonstrate behavioral control of depressive symptoms, verbalize feelings of improved mood/affect, and adopt new coping skills prior to discharge  Outcome: Progressing  Goal: Verbalize thoughts and feelings  Description  Interventions:  - Assess and re-assess patient's level of risk   - Engage patient in 1:1 interactions, daily, for a minimum of 15 minutes   - Encourage patient to express feelings, fears, frustrations, hopes   Outcome: Progressing  Goal: Refrain from harming self  Description  Interventions:  - Monitor patient closely, per order   - Supervise medication ingestion, monitor effects and side effects   Outcome: Progressing  Goal: Refrain from isolation  Description  Interventions:  - Develop a trusting relationship   - Encourage socialization   Outcome: Progressing  Goal: Refrain from self-neglect  Outcome: Progressing  Goal: Complete daily ADLs, including personal hygiene independently, as able  Description  Interventions:  - Observe, teach, and assist patient with ADLS  -  Monitor and promote a balance of rest/activity, with adequate nutrition and elimination   Outcome: Progressing     Problem: Anxiety  Goal: Anxiety is at manageable level  Description  Interventions:  - Assess and monitor patient's anxiety level  - Monitor for signs and symptoms of anxiety both physical and emotional (heart palpitations, chest pain, shortness of breath, headaches, nausea, feeling jumpy, restlessness, irritable, apprehensive)  - Collaborate with interdisciplinary team and initiate plan and interventions as ordered    - [place] patient to unit/surroundings  - Explain treatment plan  - Encourage participation in care  - Encourage verbalization of concerns/fears  - Identify coping mechanisms  - Assist in developing anxiety-reducing skills  - Administer/offer alternative therapies  - Limit or eliminate stimulants  Outcome: Progressing     Problem: Prexisting or High Potential for Compromised Skin Integrity  Goal: Skin integrity is maintained or improved  Description  INTERVENTIONS:  - Identify patients at risk for skin breakdown  - Assess and monitor skin integrity  - Assess and monitor nutrition and hydration status  - Monitor labs (i e  albumin)  - Assess for incontinence   - Turn and reposition patient  - Assist with mobility/ambulation  - Relieve pressure over bony prominences  - Avoid friction and shearing  - Provide appropriate hygiene as needed including keeping skin clean and dry  - Evaluate need for skin moisturizer/barrier cream  - Collaborate with interdisciplinary team (i e  Nutrition, Rehabilitation, etc )   - Patient/family teaching  Outcome: Progressing

## 2019-07-02 NOTE — PROGRESS NOTES
Resting in bed with eyes closed, O2 at 2 L   No s/s of respiratory distress  Monitored on Q 7 minute safety checks

## 2019-07-02 NOTE — PROGRESS NOTES
07/02/19 0900   Team Meeting   Meeting Type Daily Rounds   Team Members Present   Team Members Present Physician;Nurse;;Occupational Therapist   Physician Team Member [facility] Team Member Negro Flores     Care Management Team Member Nica Watkins    OT Team Member Robert LR      Pt discussed at treatment rounds today, no medication changes made

## 2019-07-02 NOTE — OCCUPATIONAL THERAPY NOTE
Occupational Therapy  Sarah Araujo was approached for OT involvement on this occasion  She stated that she was concerned that she had not yet heard from her , she did not want to do anything at this time because this was on her mind  She was pleasant, polite in her interactions with this writer  She was asked if she wanted to work on an art activity she had started last week  She stated that she liked to do her artwork when she was by herself  I will continue to approach her again on another day and encourage activity engagement when she is accepting of this    Av Cabrera OT

## 2019-07-02 NOTE — NURSING NOTE
Patient has been isolative to room, pleasant on approach  Attention seeking and somatic at times, behaviors controlled this evening  Patient has been compliant with medications and meals  Labs, orders and vital signs have been reviewed  On routine checks, will continue to monitor

## 2019-07-02 NOTE — CASE MANAGEMENT
Patient continues to be somatic, delusional, and has poor insight into her mental health  Patient still fixated on moving into [facility] however does not understand that she does not have the fiances for living arraignments as such and does not understand that she will not return to the community until she completed the [facility] program  Patient remains needing constant redirection regarding her call bell  CM will continue to follow and provide services as needed

## 2019-07-03 PROCEDURE — 99231 SBSQ HOSP IP/OBS SF/LOW 25: CPT | Performed by: PSYCHIATRY & NEUROLOGY

## 2019-07-03 RX ADMIN — THEOPHYLLINE ANHYDROUS 200 MG: 200 CAPSULE, EXTENDED RELEASE ORAL at 08:36

## 2019-07-03 RX ADMIN — FLUTICASONE FUROATE AND VILANTEROL TRIFENATATE 1 PUFF: 200; 25 POWDER RESPIRATORY (INHALATION) at 08:36

## 2019-07-03 RX ADMIN — LEVOTHYROXINE SODIUM 125 MCG: 125 TABLET ORAL at 06:17

## 2019-07-03 RX ADMIN — QUETIAPINE FUMARATE 150 MG: 150 TABLET, EXTENDED RELEASE ORAL at 21:16

## 2019-07-03 RX ADMIN — PANTOPRAZOLE SODIUM 40 MG: 40 TABLET, DELAYED RELEASE ORAL at 06:17

## 2019-07-03 RX ADMIN — PALIPERIDONE 12 MG: 6 TABLET, EXTENDED RELEASE ORAL at 08:36

## 2019-07-03 NOTE — PROGRESS NOTES
Pt pleasant and med-compliant except lovenox  Good appetite at breakfast, refused lunch  VSS  Denied SI  Pox 99% with O2 at 2 liters/min via nc  Monitored for safety and support

## 2019-07-03 NOTE — PROGRESS NOTES
07/03/19 0900   Team Meeting   Meeting Type Daily Rounds   Team Members Present   Team Members Present Physician;Nurse;   Physician Team Member Dr Ad Lane Team Member [facility] ([facility])    Care Management Team Member Doroteo LOZANO      Pt discussed at treatment rounds today, no medication changes made   Continue to awaiting EAC bed

## 2019-07-03 NOTE — PROGRESS NOTES
[address] [facility]  Psychiatrists  Progress Note    Patient Name: Walter Dillon  MRN: [id number]  DOS: 07/03/19    Chief Complaint:  paranoid delusions    Interval History: Patient has been compliant with psychotropic medications  Patient frequently uses call bell for non-emergencies  Patient remains paranoid about family and talks about the device in her arm  She complains about restlessness with paliperidone though none have been noted by staff  She now requests to go back to [place]  Mental Status Exam [Per above +]  Appearance: poor grooming; fair hygiene; no abnormal involuntary movements noted  Behavior: superficially cooperative  Speech: wnl, somewhat pressured  Mood: reported as euthymic  Affect: neutral, constricted, stable  Thought process: illogical  Thought content: paranoid and somatic delusions are ongoing  Perceptual disturbances: denies AH/VH  Cognition: oriented to all domains     Insight: poor  Judgement: poor        Current Facility-Administered Medications:     acetaminophen (TYLENOL) tablet 650 mg, 650 mg, Oral, Q6H PRN, Dusty Thacker MD    albuterol (PROVENTIL HFA,VENTOLIN HFA) inhaler 2 puff, 2 puff, Inhalation, Q4H PRN, ABILIO Ivory    aluminum-magnesium hydroxide-simethicone (MYLANTA) 200-200-20 mg/5 mL oral suspension 15 mL, 15 mL, Oral, Q4H PRN, Robby Westbrook MD, 15 mL at 05/12/19 2221    ammonium lactate (LAC-HYDRIN) 12 % lotion, , Topical, BID PRN, ABILIO Ivory, 1 application at 37/62/07 1114    benzonatate (TESSALON PERLES) capsule 100 mg, 100 mg, Oral, TID PRN, Dusty Thacker MD, 100 mg at 06/27/19 0928    benztropine (COGENTIN) injection 1 mg, 1 mg, Intramuscular, Q8H PRN, Toñito Buckley MD    benztropine (COGENTIN) tablet 1 mg, 1 mg, Oral, Q8H PRN, Robby Westbrook MD    enoxaparin (LOVENOX) subcutaneous injection 40 mg, 40 mg, Subcutaneous, Daily, MD Ronnie Escalona fluticasone-vilanterol (BREO ELLIPTA) 200-25 MCG/INH inhaler 1 puff, 1 puff, Inhalation, Daily, Thierry Stephenson MD, 1 puff at 07/03/19 0836    haloperidol (HALDOL) oral concentrated solution 1 mg, 1 mg, Oral, Q6H PRN, Chirag Nogueira MD    haloperidol lactate (HALDOL) injection 2 mg, 2 mg, Intramuscular, Q6H PRN, Chirag Nogueira MD    hydrOXYzine HCL (ATARAX) tablet 25 mg, 25 mg, Oral, Q6H PRN, Ruddy Jorge MD, 25 mg at 06/30/19 1417    levothyroxine tablet 125 mcg, 125 mcg, Oral, Early Morning, Yana Werner MD, 125 mcg at 07/03/19 0617    magnesium hydroxide (MILK OF MAGNESIA) 400 mg/5 mL oral suspension 30 mL, 30 mL, Oral, Daily PRN, Shanta Mullen MD    paliperidone (INVEGA) 24 hr tablet 12 mg, 12 mg, Oral, Daily, 12 mg at 07/03/19 0836 **OR** OLANZapine (ZyPREXA) IM injection 10 mg, 10 mg, Intramuscular, Daily, Jorge Rob, CRNP    QUEtiapine (SEROquel XR) 24 hr tablet 150 mg, 150 mg, Oral, HS, 150 mg at 07/02/19 2153 **OR** OLANZapine (ZyPREXA) IM injection 10 mg, 10 mg, Intramuscular, HS, Jorge Rob, CRNP    pantoprazole (PROTONIX) EC tablet 40 mg, 40 mg, Oral, Early Morning, Yana Werner MD, 40 mg at 07/03/19 0617    polyethylene glycol (MIRALAX) packet 17 g, 17 g, Oral, Daily PRN, Jorge Rob, CRNP    risperiDONE (RisperDAL M-TABS) dispersible tablet 1 mg, 1 mg, Oral, Q8H PRN, Ruddy Jorge MD    theophylline (JEF-24) 24 hr capsule 200 mg, 200 mg, Oral, Daily, Yana Werner MD, 200 mg at 07/03/19 0836    traZODone (DESYREL) tablet 25 mg, 25 mg, Oral, HS PRN, Yana Werner MD    tuberculin injection 5 Units, 5 Units, Intradermal, Once, Chirag Nogueira MD, Stopped at 05/22/19 1425    Vitals:    07/03/19 1448   BP: 126/75   Pulse: 96   Resp: 17   Temp: 98 2 °F (36 8 °C)   SpO2: 93%       Lab/work up: no new labs    Assessment:     Axis I:  · Schizoaffective disorder; has been treatment resistant  Axis II:  · OCPD  · Cluster B traits  Axis III:  - COPD with asthma, stable compression fracture of L4   Axis IV:  · Limited social support, chronically homeless, financial problems, on disability, noncompliant with treatment    Progress: limited    Plan:   1  Disposition: Patient meets criteria for ongoing acute inpatient treatment due to being danger to self 2/2 psychosis and poor self care  Patient will be discharged when there is an absence of psychosis [id number]  2  Legal status: 304  3  Psychopharmacologic interventions:   - continue invega 12mg po daily and seroquel XR 150mg po qhs   - Continue to monitor psychosis  4  Medical comorbidities: Hospitalist following PRN  5  Other therapies: Individual/group/milieu therapy as appropriate   6  Social issues: Case management following to arrange disposition - likely transfer to [facility]  7   Precautions: Routine q15min checks, vitals qshift    Espinoza Carter MD  07/03/19

## 2019-07-03 NOTE — NURSING NOTE
Patient in bed asleep at this time, no concerns or apparent distress noted  Has been compliant with treatment, on 2L O2 continuous and behaviors controlled  On routine checks, will continue to monitor

## 2019-07-03 NOTE — PLAN OF CARE
Problem: Alteration in Thoughts and Perception  Goal: Treatment Goal: Gain control of psychotic behaviors/thinking, reduce/eliminate presenting symptoms and demonstrate improved reality functioning upon discharge  Outcome: Progressing  Goal: Verbalize thoughts and feelings  Description  Interventions:  - Promote a nonjudgmental and trusting relationship with the patient through active listening and therapeutic communication  - Assess patient's level of functioning, behavior and potential for risk  - Engage patient in 1 on 1 interactions for a minimum of 15 minutes each session  - Encourage patient to express fears, feelings, frustrations, and discuss symptoms    - [place] patient to reality, help patient recognize reality-based thinking   - Administer medications as ordered and assess for potential side effects  - Provide the patient education related to the signs and symptoms of the illness and desired effects of prescribed medications  Outcome: Progressing  Goal: Refrain from acting on delusional thinking/internal stimuli  Description  Interventions:  - Monitor patient closely, per order   - Utilize least restrictive measures   - Set reasonable limits, give positive feedback for acceptable   - Administer medications as ordered and monitor of potential side effects  Outcome: Progressing  Goal: Agree to be compliant with medication regime, as prescribed and report medication side effects  Description  Interventions:  - Offer appropriate PRN medication and supervise ingestion; conduct aims, as needed   Outcome: Progressing  Goal: Recognize dysfunctional thoughts, communicate reality-based thoughts at the time of discharge  Description  Interventions:  - Provide medication and psycho-education to assist patient in compliance and developing insight into his/her illness   Outcome: Progressing  Goal: Complete daily ADLs, including personal hygiene independently, as able  Description  Interventions:  - Observe, teach, and assist patient with ADLS  - Monitor and promote a balance of rest/activity, with adequate nutrition and elimination   Outcome: Progressing     Problem: Risk for Self Injury/Neglect  Goal: Treatment Goal: Remain safe during length of stay, learn and adopt new coping skills, and be free of self-injurious ideation, impulses and acts at the time of discharge  Outcome: Progressing  Goal: Verbalize thoughts and feelings  Description  Interventions:  - Assess and re-assess patient's lethality and potential for self-injury  - Engage patient in 1:1 interactions, daily, for a minimum of 15 minutes  - Encourage patient to express feelings, fears, frustrations, hopes  - Establish rapport/trust with patient   Outcome: Progressing  Goal: Refrain from harming self  Description  Interventions:  - Monitor patient closely, per order  - Develop a trusting relationship  - Supervise medication ingestion, monitor effects and side effects   Outcome: Progressing  Goal: Recognize maladaptive responses and adopt new coping mechanisms  Outcome: Progressing  Goal: Complete daily ADLs, including personal hygiene independently, as able  Description  Interventions:  - Observe, teach, and assist patient with ADLS  - Monitor and promote a balance of rest/activity, with adequate nutrition and elimination  Outcome: Progressing     Problem: Depression  Goal: Treatment Goal: Demonstrate behavioral control of depressive symptoms, verbalize feelings of improved mood/affect, and adopt new coping skills prior to discharge  Outcome: Progressing  Goal: Verbalize thoughts and feelings  Description  Interventions:  - Assess and re-assess patient's level of risk   - Engage patient in 1:1 interactions, daily, for a minimum of 15 minutes   - Encourage patient to express feelings, fears, frustrations, hopes   Outcome: Progressing  Goal: Refrain from harming self  Description  Interventions:  - Monitor patient closely, per order   - Supervise medication ingestion, monitor effects and side effects   Outcome: Progressing  Goal: Refrain from isolation  Description  Interventions:  - Develop a trusting relationship   - Encourage socialization   Outcome: Progressing  Goal: Refrain from self-neglect  Outcome: Progressing  Goal: Complete daily ADLs, including personal hygiene independently, as able  Description  Interventions:  - Observe, teach, and assist patient with ADLS  -  Monitor and promote a balance of rest/activity, with adequate nutrition and elimination   Outcome: Progressing     Problem: Anxiety  Goal: Anxiety is at manageable level  Description  Interventions:  - Assess and monitor patient's anxiety level  - Monitor for signs and symptoms of anxiety both physical and emotional (heart palpitations, chest pain, shortness of breath, headaches, nausea, feeling jumpy, restlessness, irritable, apprehensive)  - Collaborate with interdisciplinary team and initiate plan and interventions as ordered    - [place] patient to unit/surroundings  - Explain treatment plan  - Encourage participation in care  - Encourage verbalization of concerns/fears  - Identify coping mechanisms  - Assist in developing anxiety-reducing skills  - Administer/offer alternative therapies  - Limit or eliminate stimulants  Outcome: Progressing     Problem: Prexisting or High Potential for Compromised Skin Integrity  Goal: Skin integrity is maintained or improved  Description  INTERVENTIONS:  - Identify patients at risk for skin breakdown  - Assess and monitor skin integrity  - Assess and monitor nutrition and hydration status  - Monitor labs (i e  albumin)  - Assess for incontinence   - Turn and reposition patient  - Assist with mobility/ambulation  - Relieve pressure over bony prominences  - Avoid friction and shearing  - Provide appropriate hygiene as needed including keeping skin clean and dry  - Evaluate need for skin moisturizer/barrier cream  - Collaborate with interdisciplinary team (i e  Nutrition, Rehabilitation, etc )   - Patient/family teaching  Outcome: Progressing

## 2019-07-04 PROCEDURE — 99231 SBSQ HOSP IP/OBS SF/LOW 25: CPT | Performed by: PHYSICIAN ASSISTANT

## 2019-07-04 RX ADMIN — FLUTICASONE FUROATE AND VILANTEROL TRIFENATATE 1 PUFF: 200; 25 POWDER RESPIRATORY (INHALATION) at 08:09

## 2019-07-04 RX ADMIN — LEVOTHYROXINE SODIUM 125 MCG: 125 TABLET ORAL at 05:17

## 2019-07-04 RX ADMIN — PALIPERIDONE 12 MG: 6 TABLET, EXTENDED RELEASE ORAL at 08:08

## 2019-07-04 RX ADMIN — THEOPHYLLINE ANHYDROUS 200 MG: 200 CAPSULE, EXTENDED RELEASE ORAL at 08:08

## 2019-07-04 RX ADMIN — QUETIAPINE FUMARATE 150 MG: 150 TABLET, EXTENDED RELEASE ORAL at 21:03

## 2019-07-04 RX ADMIN — PANTOPRAZOLE SODIUM 40 MG: 40 TABLET, DELAYED RELEASE ORAL at 05:18

## 2019-07-04 NOTE — PLAN OF CARE
Problem: Alteration in Thoughts and Perception  Goal: Treatment Goal: Gain control of psychotic behaviors/thinking, reduce/eliminate presenting symptoms and demonstrate improved reality functioning upon discharge  Outcome: Progressing  Goal: Verbalize thoughts and feelings  Description  Interventions:  - Promote a nonjudgmental and trusting relationship with the patient through active listening and therapeutic communication  - Assess patient's level of functioning, behavior and potential for risk  - Engage patient in 1 on 1 interactions for a minimum of 15 minutes each session  - Encourage patient to express fears, feelings, frustrations, and discuss symptoms    - [place] patient to reality, help patient recognize reality-based thinking   - Administer medications as ordered and assess for potential side effects  - Provide the patient education related to the signs and symptoms of the illness and desired effects of prescribed medications  Outcome: Progressing  Goal: Refrain from acting on delusional thinking/internal stimuli  Description  Interventions:  - Monitor patient closely, per order   - Utilize least restrictive measures   - Set reasonable limits, give positive feedback for acceptable   - Administer medications as ordered and monitor of potential side effects  Outcome: Progressing  Goal: Agree to be compliant with medication regime, as prescribed and report medication side effects  Description  Interventions:  - Offer appropriate PRN medication and supervise ingestion; conduct aims, as needed   Outcome: Progressing  Goal: Attend and participate in unit activities, including therapeutic, recreational, and educational groups  Description  Interventions:  - Provide therapeutic and educational activities daily, encourage attendance and participation, and document same in the medical record   Outcome: Progressing  Goal: Recognize dysfunctional thoughts, communicate reality-based thoughts at the time of discharge  Description  Interventions:  - Provide medication and psycho-education to assist patient in compliance and developing insight into his/her illness   Outcome: Progressing  Goal: Complete daily ADLs, including personal hygiene independently, as able  Description  Interventions:  - Observe, teach, and assist patient with ADLS  - Monitor and promote a balance of rest/activity, with adequate nutrition and elimination   Outcome: Progressing     Problem: Ineffective Coping  Goal: Identifies ineffective coping skills  Outcome: Progressing  Goal: Identifies healthy coping skills  Outcome: Progressing  Goal: Demonstrates healthy coping skills  Outcome: Progressing  Goal: Participates in unit activities  Description  Interventions:  - Provide therapeutic environment   - Provide required programming   - Redirect inappropriate behaviors   Outcome: Progressing  Goal: Patient/Family participate in treatment and DC plans  Description  Interventions:  - Provide therapeutic environment  Outcome: Progressing  Goal: Patient/Family verbalizes awareness of resources  Outcome: Progressing  Goal: Understands least restrictive measures  Description  Interventions:  - Utilize least restrictive behavior  Outcome: Progressing  Goal: Free from restraint events  Description  - Utilize least restrictive measures   - Provide behavioral interventions   - Redirect inappropriate behaviors   Outcome: Progressing     Problem: Risk for Self Injury/Neglect  Goal: Treatment Goal: Remain safe during length of stay, learn and adopt new coping skills, and be free of self-injurious ideation, impulses and acts at the time of discharge  Outcome: Progressing  Goal: Verbalize thoughts and feelings  Description  Interventions:  - Assess and re-assess patient's lethality and potential for self-injury  - Engage patient in 1:1 interactions, daily, for a minimum of 15 minutes  - Encourage patient to express feelings, fears, frustrations, hopes  - Establish rapport/trust with patient   Outcome: Progressing  Goal: Refrain from harming self  Description  Interventions:  - Monitor patient closely, per order  - Develop a trusting relationship  - Supervise medication ingestion, monitor effects and side effects   Outcome: Progressing  Goal: Attend and participate in unit activities, including therapeutic, recreational, and educational groups  Description  Interventions:  - Provide therapeutic and educational activities daily, encourage attendance and participation, and document same in the medical record  - Obtain collateral information, encourage visitation and family involvement in care   Outcome: Progressing  Goal: Recognize maladaptive responses and adopt new coping mechanisms  Outcome: Progressing  Goal: Complete daily ADLs, including personal hygiene independently, as able  Description  Interventions:  - Observe, teach, and assist patient with ADLS  - Monitor and promote a balance of rest/activity, with adequate nutrition and elimination  Outcome: Progressing     Problem: Depression  Goal: Treatment Goal: Demonstrate behavioral control of depressive symptoms, verbalize feelings of improved mood/affect, and adopt new coping skills prior to discharge  Outcome: Progressing  Goal: Verbalize thoughts and feelings  Description  Interventions:  - Assess and re-assess patient's level of risk   - Engage patient in 1:1 interactions, daily, for a minimum of 15 minutes   - Encourage patient to express feelings, fears, frustrations, hopes   Outcome: Progressing  Goal: Refrain from harming self  Description  Interventions:  - Monitor patient closely, per order   - Supervise medication ingestion, monitor effects and side effects   Outcome: Progressing  Goal: Refrain from isolation  Description  Interventions:  - Develop a trusting relationship   - Encourage socialization   Outcome: Progressing  Goal: Refrain from self-neglect  Outcome: Progressing  Goal: Attend and participate in unit activities, including therapeutic, recreational, and educational groups  Description  Interventions:  - Provide therapeutic and educational activities daily, encourage attendance and participation, and document same in the medical record   Outcome: Progressing  Goal: Complete daily ADLs, including personal hygiene independently, as able  Description  Interventions:  - Observe, teach, and assist patient with ADLS  -  Monitor and promote a balance of rest/activity, with adequate nutrition and elimination   Outcome: Progressing     Problem: Anxiety  Goal: Anxiety is at manageable level  Description  Interventions:  - Assess and monitor patient's anxiety level  - Monitor for signs and symptoms of anxiety both physical and emotional (heart palpitations, chest pain, shortness of breath, headaches, nausea, feeling jumpy, restlessness, irritable, apprehensive)  - Collaborate with interdisciplinary team and initiate plan and interventions as ordered    - [place] patient to unit/surroundings  - Explain treatment plan  - Encourage participation in care  - Encourage verbalization of concerns/fears  - Identify coping mechanisms  - Assist in developing anxiety-reducing skills  - Administer/offer alternative therapies  - Limit or eliminate stimulants  Outcome: Progressing     Problem: Prexisting or High Potential for Compromised Skin Integrity  Goal: Skin integrity is maintained or improved  Description  INTERVENTIONS:  - Identify patients at risk for skin breakdown  - Assess and monitor skin integrity  - Assess and monitor nutrition and hydration status  - Monitor labs (i e  albumin)  - Assess for incontinence   - Turn and reposition patient  - Assist with mobility/ambulation  - Relieve pressure over bony prominences  - Avoid friction and shearing  - Provide appropriate hygiene as needed including keeping skin clean and dry  - Evaluate need for skin moisturizer/barrier cream  - Collaborate with interdisciplinary team (i e  Nutrition, Rehabilitation, etc )   - Patient/family teaching  Outcome: Progressing

## 2019-07-04 NOTE — PROGRESS NOTES
Progress Note - Behavioral Health   Rise Rima 60 y o  female MRN: [id number]  Unit/Bed#: [facility] 209-06 Encounter: [id number]    Assessment  Active Problems:Schizoaffective disorder, bipolar type [facility])  Patient Active Problem List   Diagnosis    Sepsis ([facility] )    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[facility] )    Tobacco use disorder, continuous    Bipolar disorder ([facility] )    Left hip pain    Lactic acidosis    Hypokalemia    Hypomagnesemia    Compression fracture of L4 lumbar vertebra    Thoracic compression fracture (HCC)    Ventral hernia    Parapneumonic effusion    Acute on chronic respiratory failure with hypoxia (HCC)    Chronic respiratory failure (HCC)    Hypophosphatemia    Elevated MCV    Compression deformity of vertebra    Schizoaffective disorder, bipolar type (HCC)    Acquired hypothyroidism    Gastroesophageal reflux disease without esophagitis    Abnormal CT of the chest    Excessive cerumen in left ear canal    Lipoma of right upper extremity    Polydipsia    Localized swelling of both lower legs       Vitals:    07/04/19 0710   BP: 116/66   Pulse: 68   Resp: 16   Temp: 97 9 °F (36 6 °C)   SpO2: 98%       Behavior over the last 24 hours:  unchanged  Sleep: normal  Appetite: normal  Medication side effects: No  ROS: no complaints    Mental Status Evaluation:  Appearance:  age appropriate and casually dressed   Behavior:  Cooperative   Speech:  normal pitch and normal volume   Mood:  anxious   Affect:  mood-congruent   Thought Process:  illogical   Thought Content:  Patient still exhibits paranoid/somatic delusions   Perceptual Disturbances: Patient denies, patient does not appear to be responding to internal stimuli   Potential for Aggression:    Suicidal/Homicidal Ideation: Not when seen    No SI or HI when seen   Sensorium:  person, place, time/date and situation   Cognition:  grossly intact   Consciousness:  alert and awake    Attention: attention span and concentration were age appropriate Insight:  limited   Judgment: limited   Gait/Station: Patient not observed ambulating   Motor Activity: no abnormal movements     Progress Toward Goals and Additional Assessment:  Patient reports I feel like things are as good as they are going to be with the medication  Things are not good, but I do not think they will get any better  We are where we are    Patient still reports paranoid delusions and lacks insight into them  Patient has resigned to the fact that she will remain in the hospital until placement can be determined  Does not know any physical symptoms or restlessness from any medication today  Denies hallucinations as well as suicidal ideation homicidal ideation  Recommended Treatment:   1) Continue with group therapy, milieu therapy and individual therapy  2) Continue current medications and treatment    Risks, benefits and possible side effects of Medications:   Risks, benefits, and possible side effects of medications explained to patient and patient verbalizes understanding        Medications:   all current active meds have been reviewed and current meds:   Current Facility-Administered Medications   Medication Dose Route Frequency    acetaminophen (TYLENOL) tablet 650 mg  650 mg Oral Q6H PRN    albuterol (PROVENTIL HFA,VENTOLIN HFA) inhaler 2 puff  2 puff Inhalation Q4H PRN    aluminum-magnesium hydroxide-simethicone (MYLANTA) 200-200-20 mg/5 mL oral suspension 15 mL  15 mL Oral Q4H PRN    ammonium lactate (LAC-HYDRIN) 12 % lotion   Topical BID PRN    benzonatate (TESSALON PERLES) capsule 100 mg  100 mg Oral TID PRN    benztropine (COGENTIN) injection 1 mg  1 mg Intramuscular Q8H PRN    benztropine (COGENTIN) tablet 1 mg  1 mg Oral Q8H PRN    enoxaparin (LOVENOX) subcutaneous injection 40 mg  40 mg Subcutaneous Daily    fluticasone-vilanterol (BREO ELLIPTA) 200-25 MCG/INH inhaler 1 puff  1 puff Inhalation Daily    haloperidol (HALDOL) oral concentrated solution 1 mg  1 mg Oral Q6H PRN    haloperidol lactate (HALDOL) injection 2 mg  2 mg Intramuscular Q6H PRN    hydrOXYzine HCL (ATARAX) tablet 25 mg  25 mg Oral Q6H PRN    levothyroxine tablet 125 mcg  125 mcg Oral Early Morning    magnesium hydroxide (MILK OF MAGNESIA) 400 mg/5 mL oral suspension 30 mL  30 mL Oral Daily PRN    paliperidone (INVEGA) 24 hr tablet 12 mg  12 mg Oral Daily    Or    OLANZapine (ZyPREXA) IM injection 10 mg  10 mg Intramuscular Daily    QUEtiapine (SEROquel XR) 24 hr tablet 150 mg  150 mg Oral HS    Or    OLANZapine (ZyPREXA) IM injection 10 mg  10 mg Intramuscular HS    pantoprazole (PROTONIX) EC tablet 40 mg  40 mg Oral Early Morning    polyethylene glycol (MIRALAX) packet 17 g  17 g Oral Daily PRN    risperiDONE (RisperDAL M-TABS) dispersible tablet 1 mg  1 mg Oral Q8H PRN    theophylline (JEF-24) 24 hr capsule 200 mg  200 mg Oral Daily    traZODone (DESYREL) tablet 25 mg  25 mg Oral HS PRN    tuberculin injection 5 Units  5 Units Intradermal Once         Labs:   Admission on 05/05/2019   Component Date Value    POC Glucose 05/10/2019 156*    Clarity, UA 05/29/2019 Clear     Color, UA 05/29/2019 Straw     Specific [place], UA 05/29/2019 1 015     pH, UA 05/29/2019 8 0     Glucose, UA 05/29/2019 Negative     Ketones, UA 05/29/2019 Negative     Blood, UA 05/29/2019 Negative     Protein, UA 05/29/2019 Negative     Nitrite, UA 05/29/2019 Negative     Bilirubin, UA 05/29/2019 Negative     Leukocytes, UA 05/29/2019 Negative     WBC, UA 05/29/2019 0-1*    RBC, UA 05/29/2019 0-1*    Bacteria, UA 05/29/2019 Occasional     Epithelial Cells 05/29/2019 Occasional     UROBILINOGEN UA 05/29/2019 Negative     WBC 05/31/2019 7 10     RBC 05/31/2019 4 16     Hemoglobin 05/31/2019 12 8     Hematocrit 05/31/2019 39 0     MCV 05/31/2019 94     MCH 05/31/2019 30 8     MCHC 05/31/2019 32 8     RDW 05/31/2019 13 5     MPV 05/31/2019 9 9     Platelets 22/84/3877 239     Sodium 05/31/2019 138     Potassium 05/31/2019 3 8     Chloride 05/31/2019 102     CO2 05/31/2019 29     ANION GAP 05/31/2019 7     BUN 05/31/2019 11     Creatinine 05/31/2019 0 59*    Glucose 05/31/2019 84     Glucose, Fasting 05/31/2019 84     Calcium 05/31/2019 9 5     AST 05/31/2019 38*    ALT 05/31/2019 40     Alkaline Phosphatase 05/31/2019 87     Total Protein 05/31/2019 6 6     Albumin 05/31/2019 3 5     Total Bilirubin 05/31/2019 0 60     eGFR 05/31/2019 99     Segmented % 05/31/2019 27*    Bands % 05/31/2019 1     Lymphocytes % 05/31/2019 53*    Monocytes % 05/31/2019 12*    Eosinophils, % 05/31/2019 3     Basophils % 05/31/2019 1     Atypical Lymphocytes % 05/31/2019 3*    Neutrophils Absolute 05/31/2019 1 99     Lymphocytes Absolute 05/31/2019 3 76     Monocytes Absolute 05/31/2019 0 85     Eosinophils Absolute 05/31/2019 0 21     Basophils Absolute 05/31/2019 0 07     Total Counted 05/31/2019 100     RBC Morphology 05/31/2019 Normal     Platelet Estimate 65/93/6476 Adequate     Ventricular Rate 06/03/2019 89     Atrial Rate 06/03/2019 89     WI Interval 06/03/2019 150     QRSD Interval 06/03/2019 90     QT Interval 06/03/2019 384     QTC Interval 06/03/2019 467     P Axis 06/03/2019 -17     QRS [place] 06/03/2019 125     T Wave Axis 06/03/2019 -1     Total Bilirubin 06/14/2019 0 30     Bilirubin, Direct 06/14/2019 0 10     Alkaline Phosphatase 06/14/2019 82     AST 06/14/2019 23     ALT 06/14/2019 36     Total Protein 06/14/2019 6 3     Albumin 06/14/2019 3 3     WBC 06/14/2019 6 50     RBC 06/14/2019 4 09     Hemoglobin 06/14/2019 12 3     Hematocrit 06/14/2019 38 2     MCV 06/14/2019 94     MCH 06/14/2019 30 0     MCHC 06/14/2019 32 1     RDW 06/14/2019 13 6     MPV 06/14/2019 9 6     Platelets 03/55/4621 210     Neutrophils Relative 06/14/2019 43*    Lymphocytes Relative 06/14/2019 42     Monocytes Relative 06/14/2019 11*    Eosinophils Relative 06/14/2019 3  Basophils Relative 06/14/2019 1     Neutrophils Absolute 06/14/2019 2 80     Lymphocytes Absolute 06/14/2019 2 80     Monocytes Absolute 06/14/2019 0 70     Eosinophils Absolute 06/14/2019 0 20     Basophils Absolute 06/14/2019 0 00     POC Glucose 06/16/2019 86     POC Glucose 06/18/2019 90     POC Glucose 06/25/2019 84        Is billing to be determined based on time spent on case? Nenita Aparicio PA-C

## 2019-07-04 NOTE — PROGRESS NOTES
Awake, alert, oriented  Cooperative on approach  Medication compliant  Pt with no complaints at this time  Visible on unit interacting appropriately with staff and peers at this time  Will continue to monitor

## 2019-07-04 NOTE — PROGRESS NOTES
Pt present on the unit  Pt continues on 2L O2  Pt continues to be somatic and manipulative at times  No concerns noted and Pt was compliant with medication regimen

## 2019-07-05 PROCEDURE — 99232 SBSQ HOSP IP/OBS MODERATE 35: CPT | Performed by: NURSE PRACTITIONER

## 2019-07-05 PROCEDURE — 97530 THERAPEUTIC ACTIVITIES: CPT

## 2019-07-05 RX ADMIN — PANTOPRAZOLE SODIUM 40 MG: 40 TABLET, DELAYED RELEASE ORAL at 06:23

## 2019-07-05 RX ADMIN — QUETIAPINE FUMARATE 150 MG: 150 TABLET, EXTENDED RELEASE ORAL at 21:45

## 2019-07-05 RX ADMIN — PALIPERIDONE 12 MG: 6 TABLET, EXTENDED RELEASE ORAL at 09:30

## 2019-07-05 RX ADMIN — LEVOTHYROXINE SODIUM 125 MCG: 125 TABLET ORAL at 06:23

## 2019-07-05 RX ADMIN — FLUTICASONE FUROATE AND VILANTEROL TRIFENATATE 1 PUFF: 200; 25 POWDER RESPIRATORY (INHALATION) at 09:33

## 2019-07-05 RX ADMIN — THEOPHYLLINE ANHYDROUS 200 MG: 200 CAPSULE, EXTENDED RELEASE ORAL at 09:30

## 2019-07-05 NOTE — PROGRESS NOTES
07/05/19 0900   Team Meeting   Meeting Type Daily Rounds   Team Members Present   Team Members Present Physician;Nurse;   Physician Team Member Dr Antoine Ziegler Team [facility] Team Member Lynnell Lennox    OT Team Member Roxanne Hernandez    Other (Discipline and Name) Steven Mary      Pt discussed at treatment rounds today, no medication changes made

## 2019-07-05 NOTE — CASE MANAGEMENT
Patient continues to be somatic, preoccupied, and paranoid  Patient believes that she should be able to return to [facility] despite the recommendation from rehab  Writer reminded patient that this option was given multiples times and she refused all options given  Patient states that this is a lie and she never refused  Writer confronted patient and patient turned the conversation into why she shouldn't be taking the medications any longer  Writer encouraged patient to stay out of the room however patient states that she will not  Patient states that she is "too restless and anxious" to stay out of her room and the cause is her medication  Patient asked for podiatry consult for her toenails  Remains awaiting bed on EAC  CM will continue to follow and provide services as needed

## 2019-07-05 NOTE — PLAN OF CARE
Problem: OCCUPATIONAL THERAPY ADULT  Goal: Performs self-care activities at highest level of function for planned discharge setting  See evaluation for individualized goals  Description  Treatment Interventions: ADL retraining, Endurance training, Continued evaluation, Activityengagement(coping skills, reality focus, life management)          See flowsheet documentation for full assessment, interventions and recommendations  Outcome: Progressing  Note:   Limitation: Decreased ADL status, Decreased high-level ADLs, Mood limitation(guarded, limited coping and life management skills)  Prognosis: Fair  Assessment: Michael Hernandez was resting in her room when approached for OT program involvement  She was pleasant, she stated that she did not want to leave her room but did wish to talk  She talked about going to [facility], she expressed concern about her O2 situation  She stated that her portable needs to be regularly charged, and it only lasts for 2 hours at most  She was concerned about having a tank available when there (although she also agreed that she would have a concentrator that could be plugged in when not able to use her portable)  She was also asked about the possibility of getting another battery for her portable, but she did not further address this  She did admit to feeling anxious at times (she attributed panic feeling to wanting to leave the hospital)  She was initially agreeable to activity "Thought-changing cards for people who are anxious", but early into the discussion, she stated that talking about anxiety provoking issues was making her more anxious  We did change topics to positive memories, and she appeared to really enjoy this discussion  She talked about her brother, sister, good memories about them, how they have always been there for her  She also shared positive memories of school, Adams's Day (her favorite holiday)   She appeared much calmer by the end of the session, and she did appear to be in good spirits at the end of this session  Progress has been slow towards her goals, she was motivated for individual involvement on this day  Her efforts and contributions were commended  Continue to promote positive focus, positive energy investment

## 2019-07-05 NOTE — PROGRESS NOTES
07/05/19 1100   Activity/Group Checklist   Group Exercise   Attendance Did not attend  (isolating in her room )

## 2019-07-05 NOTE — PROGRESS NOTES
Progress Note - Behavioral Health     Estela Chung 60 y o  female MRN: [id number]  Unit/Bed#: [facility] 400-72 Encounter: [id number]    Estela Chung was seen for continuing care and reviewed with treatment team   Pt known to this provider  She was in bed on my arrival, on continuous O2 via NC and speaking and breathing comfortably  Pt reported I am not doing so good--she feels like the in vague it is not working that it is sedating causing restlessness and anxiousness and making her shake her feet  She is depressed but presently denies SI, HI, or hallucinations  In regards to paranoia she becomes a bit vague but eventually admits that she can worry that her food is not safe, that she may be getting poisoned, because she cannot account for the character of other people  Patient make specific mention that she does not want Artane and she worries about stiffness  I spoke to her about being in bed all the time which can lead to more sedating effect on the body and she states that she does not want to get out of bed because I just do not care to   She talks about blurriness of [place] but denies this as a side effect she states that her eyes can tear and get crusty but he they do not appear so now and she has not ever shown the nurses when this has happened  Floor team relays the patient has been calm, cooperative, and medication compliant  She is isolative to her room much of the time, but when she is visible on the unit, she is appropriate with peers      Sleep:Good  Appetite: Good   Medication side effects: As per HPI    ROS: As per HPI    Labs: Reviewed    Mental Status Evaluation:  Appearance:  Dressed, Fair hygiene, Good eye contact, Speaking in breathing without labor during interview   Behavior:  Calm, cooperative, pleasant   Speech:  Clear, normal rate and volume, Pressured   Mood:  Anxious, Depressed   Affect:  Constricted   Thought Process:  Organized, Negative, perseverative on her somatic complaints, circumstantial but no longer tangential   Focused on having control of things--ie Txs    Associations: Intact associations   Thought Content:  Paranoid delusions   Perceptual Disturbances: Patient denies hallucinations and does not fully admit to paranoia but she appears paranoid  Patient does not appear to be responding to internal stimuli     Risk Potential: Pt presently denies SI or HI    Sensorium:  Self, birthday, Place, Day of the week, Month, Year   Memory:  short term memory grossly intact   Consciousness:  alert, awake   Attention: Good   Insight:  Limited   Judgment: Limited   Gait/Station: Unobserved--Pt in bed   Motor Activity: No abnormal movements     Vitals:    07/04/19 2047 07/05/19 0647 07/05/19 0900 07/05/19 1533   BP: 123/84 109/58  107/65   BP Location: Right arm Right arm  Right arm   Pulse: 102 73  67   Resp: 18 17  18   Temp: 97 6 °F (36 4 °C) 97 8 °F (36 6 °C)  97 7 °F (36 5 °C)   TempSrc: Temporal Tympanic  Tympanic   SpO2: 94% 93% 93% 96%   Weight:       Height:           Admission on 05/05/2019   Component Date Value    POC Glucose 05/10/2019 156*    Clarity, UA 05/29/2019 Clear     Color, UA 05/29/2019 Straw     Specific [place], UA 05/29/2019 1 015     pH, UA 05/29/2019 8 0     Glucose, UA 05/29/2019 Negative     Ketones, UA 05/29/2019 Negative     Blood, UA 05/29/2019 Negative     Protein, UA 05/29/2019 Negative     Nitrite, UA 05/29/2019 Negative     Bilirubin, UA 05/29/2019 Negative     Leukocytes, UA 05/29/2019 Negative     WBC, UA 05/29/2019 0-1*    RBC, UA 05/29/2019 0-1*    Bacteria, UA 05/29/2019 Occasional     Epithelial Cells 05/29/2019 Occasional     UROBILINOGEN UA 05/29/2019 Negative     WBC 05/31/2019 7 10     RBC 05/31/2019 4 16     Hemoglobin 05/31/2019 12 8     Hematocrit 05/31/2019 39 0     MCV 05/31/2019 94     MCH 05/31/2019 30 8     MCHC 05/31/2019 32 8     RDW 05/31/2019 13 5     MPV 05/31/2019 9 9     Platelets 48/69/1291 239  Sodium 05/31/2019 138     Potassium 05/31/2019 3 8     Chloride 05/31/2019 102     CO2 05/31/2019 29     ANION GAP 05/31/2019 7     BUN 05/31/2019 11     Creatinine 05/31/2019 0 59*    Glucose 05/31/2019 84     Glucose, Fasting 05/31/2019 84     Calcium 05/31/2019 9 5     AST 05/31/2019 38*    ALT 05/31/2019 40     Alkaline Phosphatase 05/31/2019 87     Total Protein 05/31/2019 6 6     Albumin 05/31/2019 3 5     Total Bilirubin 05/31/2019 0 60     eGFR 05/31/2019 99     Segmented % 05/31/2019 27*    Bands % 05/31/2019 1     Lymphocytes % 05/31/2019 53*    Monocytes % 05/31/2019 12*    Eosinophils, % 05/31/2019 3     Basophils % 05/31/2019 1     Atypical Lymphocytes % 05/31/2019 3*    Neutrophils Absolute 05/31/2019 1 99     Lymphocytes Absolute 05/31/2019 3 76     Monocytes Absolute 05/31/2019 0 85     Eosinophils Absolute 05/31/2019 0 21     Basophils Absolute 05/31/2019 0 07     Total Counted 05/31/2019 100     RBC Morphology 05/31/2019 Normal     Platelet Estimate 57/47/8503 Adequate     Ventricular Rate 06/03/2019 89     Atrial Rate 06/03/2019 89     CA Interval 06/03/2019 150     QRSD Interval 06/03/2019 90     QT Interval 06/03/2019 384     QTC Interval 06/03/2019 467     P Axis 06/03/2019 -17     QRS [place] 06/03/2019 125     T Wave Axis 06/03/2019 -1     Total Bilirubin 06/14/2019 0 30     Bilirubin, Direct 06/14/2019 0 10     Alkaline Phosphatase 06/14/2019 82     AST 06/14/2019 23     ALT 06/14/2019 36     Total Protein 06/14/2019 6 3     Albumin 06/14/2019 3 3     WBC 06/14/2019 6 50     RBC 06/14/2019 4 09     Hemoglobin 06/14/2019 12 3     Hematocrit 06/14/2019 38 2     MCV 06/14/2019 94     MCH 06/14/2019 30 0     MCHC 06/14/2019 32 1     RDW 06/14/2019 13 6     MPV 06/14/2019 9 6     Platelets 59/70/4867 210     Neutrophils Relative 06/14/2019 43*    Lymphocytes Relative 06/14/2019 42     Monocytes Relative 06/14/2019 11*    Eosinophils Relative 06/14/2019 3     Basophils Relative 06/14/2019 1     Neutrophils Absolute 06/14/2019 2 80     Lymphocytes Absolute 06/14/2019 2 80     Monocytes Absolute 06/14/2019 0 70     Eosinophils Absolute 06/14/2019 0 20     Basophils Absolute 06/14/2019 0 00     POC Glucose 06/16/2019 86     POC Glucose 06/18/2019 90     POC Glucose 06/25/2019 84        Progress Toward Goals:  Based on today's interview and review of prior notes, Pt has not changed much  Continue present medications  Benztropine is on board for potential EPS symptoms and I encouraged patient to request this on a p r n  Basis  Patient was open to doing so  I also advised her to notify the nurses if she has any discharge from her eyes  Encourage OOB  Patient is awaiting a bed to be available for [facility] placement  Assessment/Plan   Principal Problem:    [facility]  Active Problems:    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    Acquired hypothyroidism    Gastroesophageal reflux disease without esophagitis      Recommended Treatment: Continue with pharmacotherapy, group therapy, milieu therapy and occupational therapy    The patient will be maintained on the following medications:    Current Facility-Administered Medications:  acetaminophen 650 mg Oral Q6H PRN Niels Le MD   albuterol 2 puff Inhalation Q4H PRN Myrle Dense, CRNP   aluminum-magnesium hydroxide-simethicone 15 mL Oral Q4H PRN Sae Wilson MD   ammonium lactate  Topical BID PRN Myrle Dense, JOSE CARLOSNP   benzonatate 100 mg Oral TID PRN Niels Le MD   benztropine 1 mg Intramuscular Q8H PRN Rosemarie Sutherland MD   benztropine 1 mg Oral Q8H PRN Sae Wilson MD   enoxaparin 40 mg Subcutaneous Daily Niels Le MD   fluticasone-vilanterol 1 puff Inhalation Daily Vani Zee MD   haloperidol 1 mg Oral Q6H PRN Rosemarie Sutherland MD   haloperidol lactate 2 mg Intramuscular Q6H PRN Rosemarie Sutherland MD   hydrOXYzine HCL 25 mg Oral Q6H PRN Sae Wilson MD levothyroxine 125 mcg Oral Early Morning Yana Werner MD   magnesium hydroxide 30 mL Oral Daily PRN Shanta Mullen MD   paliperidone 12 mg Oral Daily Jorge Rob, CRNP   Or       OLANZapine 10 mg Intramuscular Daily Jorge Rob, CRNP   QUEtiapine 150 mg Oral HS Jorge Rob, CRNP   Or       OLANZapine 10 mg Intramuscular HS Jorge Rob, CRNP   pantoprazole 40 mg Oral Early Morning Yana Werner MD   polyethylene glycol 17 g Oral Daily PRN Jorge Rob, CRNP   risperiDONE 1 mg Oral Q8H PRN Ruddy Jorge MD   theophylline 200 mg Oral Daily Yana Werner MD   traZODone 25 mg Oral HS PRN Yana Werner MD   tuberculin 5 Units Intradermal Once Chirag Nogueira MD       Risks, benefits and possible side effects of Medications:   Risks, benefits, and possible side effects of medications explained to patient and patient verbalizes understanding

## 2019-07-05 NOTE — NURSING NOTE
Patient is pleasant and cooperative with medication  She remains on 2L   Remains isolative in her room exept meal time

## 2019-07-05 NOTE — OCCUPATIONAL THERAPY NOTE
Occupational Therapy Activity Treatment Note      Evelin Morrow    7/5/2019    Patient Active Problem List   Diagnosis    Sepsis [facility]    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[facility]    Tobacco use disorder, continuous    Bipolar disorder ([facility]    Left hip pain    Lactic acidosis    Hypokalemia    Hypomagnesemia    Compression fracture of L4 lumbar vertebra    Thoracic compression fracture (HCC)    Ventral hernia    Parapneumonic effusion    Acute on chronic respiratory failure with hypoxia (HCC)    Chronic respiratory failure (HCC)    Hypophosphatemia    Elevated MCV    Compression deformity of vertebra    Schizoaffective disorder, bipolar type ([facility]    Acquired hypothyroidism    Gastroesophageal reflux disease without esophagitis    Abnormal CT of the chest    Excessive cerumen in left ear canal    Lipoma of right upper extremity    Polydipsia    Localized swelling of both lower legs       Past Medical History:   Diagnosis Date    Acid reflux     Anxiety     Asthma     Bipolar 1 disorder (HCC)     Chronic pain disorder     Chronic respiratory failure (HCC)     Compression fracture of fourth lumbar vertebra ([facility]     COPD (chronic obstructive pulmonary disease) (HCC)     Depression     GERD (gastroesophageal reflux disease)     History of home oxygen therapy     Hypothyroidism     Lipoma of upper extremity     Psychiatric illness     Schizoaffective disorder ([facility]     Substance abuse ([facility]     Nicotine    Thoracic compression fracture ([facility]     Ventral hernia        No past surgical history on file  07/05/19 1852   Assessment   Assessment Patsy was resting in her room when approached for OT program involvement  She was pleasant, she stated that she did not want to leave her room but did wish to talk  She talked about going to [facility], she expressed concern about her O2 situation   She stated that her portable needs to be regularly charged, and it only lasts for 2 hours at most  She was concerned about having a tank available when there (although she also agreed that she would have a concentrator that could be plugged in when not able to use her portable)  She was also asked about the possibility of getting another battery for her portable, but she did not further address this  She did admit to feeling anxious at times (she attributed panic feeling to wanting to leave the hospital)  She was initially agreeable to activity "Thought-changing cards for people who are anxious", but early into the discussion, she stated that talking about anxiety provoking issues was making her more anxious  We did change topics to positive memories, and she appeared to really enjoy this discussion  She talked about her brother, sister, good memories about them, how they have always been there for her  She also shared positive memories of school, [place] Day (her favorite holiday)  She appeared much calmer by the end of the session, and she did appear to be in good spirits at the end of this session  Progress has been slow towards her goals, she was motivated for individual involvement on this day  Her efforts and contributions were commended  Continue to promote positive focus, positive energy investment  Plan   Treatment Interventions ADL retraining; Endurance training;Continued evaluation; Activityengagement  (coping skills, reality focus, life management)   Goal Expiration Date 07/14/19   Treatment Day 22   OT Frequency 2-3x/wk   Chucky Severance, OT

## 2019-07-05 NOTE — PROGRESS NOTES
Patient remains isolative to her room except  When her sister visited  Vitals are stable  Remains on 2 liters  Reinforced coming out to the nurses station when she needs something  Compliant with medications  Will continue to monitor on q 7 minute checks

## 2019-07-06 RX ORDER — POLYVINYL ALCOHOL 14 MG/ML
1 SOLUTION/ DROPS OPHTHALMIC
Status: DISCONTINUED | OUTPATIENT
Start: 2019-07-06 | End: 2019-07-23 | Stop reason: HOSPADM

## 2019-07-06 RX ADMIN — THEOPHYLLINE ANHYDROUS 200 MG: 200 CAPSULE, EXTENDED RELEASE ORAL at 09:12

## 2019-07-06 RX ADMIN — FLUTICASONE FUROATE AND VILANTEROL TRIFENATATE 1 PUFF: 200; 25 POWDER RESPIRATORY (INHALATION) at 08:13

## 2019-07-06 RX ADMIN — LEVOTHYROXINE SODIUM 125 MCG: 125 TABLET ORAL at 05:30

## 2019-07-06 RX ADMIN — PALIPERIDONE 12 MG: 6 TABLET, EXTENDED RELEASE ORAL at 09:12

## 2019-07-06 RX ADMIN — PANTOPRAZOLE SODIUM 40 MG: 40 TABLET, DELAYED RELEASE ORAL at 05:29

## 2019-07-06 RX ADMIN — QUETIAPINE FUMARATE 150 MG: 150 TABLET, EXTENDED RELEASE ORAL at 21:25

## 2019-07-06 NOTE — PROGRESS NOTES
Psychiatry Progress Note    Subjective: Interval History     Patient resting comfortably in bed on continuous oxygen when assessed by author  Patient reports that she is feeling well today  Patient expressing that she feels she had a good conversation with her attending psychiatrist yesterday and that she is happy with the result of their discussion  Patient however was unwilling to discuss further  Patient superficially denying all psychiatric symptoms  Reports to be preoccupied with adverse effects to her psychotropic medications  Was compliant with medications and meals yesterday  Did sleep last evening  No behavioral outburst in the past 24 hours  Placement for Extended acute Care pending      Behavior over the last 24 hours:  unchanged  Sleep: normal  Appetite: normal  Medication side effects: No  ROS:  None noted at this time    Current medications:    Current Facility-Administered Medications:     acetaminophen (TYLENOL) tablet 650 mg, 650 mg, Oral, Q6H PRN, Darci Lee MD    albuterol (PROVENTIL HFA,VENTOLIN HFA) inhaler 2 puff, 2 puff, Inhalation, Q4H PRN, ABILIO Rivera    aluminum-magnesium hydroxide-simethicone (MYLANTA) 200-200-20 mg/5 mL oral suspension 15 mL, 15 mL, Oral, Q4H PRN, Elysia So MD, 15 mL at 05/12/19 2221    ammonium lactate (LAC-HYDRIN) 12 % lotion, , Topical, BID PRN, ABILIO Rivera, 1 application at 07/30/71 1114    benzonatate (TESSALON PERLES) capsule 100 mg, 100 mg, Oral, TID PRN, Darci Lee MD, 100 mg at 06/27/19 0928    benztropine (COGENTIN) injection 1 mg, 1 mg, Intramuscular, Q8H PRN, Cely Conway MD    benztropine (COGENTIN) tablet 1 mg, 1 mg, Oral, Q8H PRN, Elysia So MD    enoxaparin (LOVENOX) subcutaneous injection 40 mg, 40 mg, Subcutaneous, Daily, Darci Lee MD    fluticasone-vilanterol (BREO ELLIPTA) 200-25 MCG/INH inhaler 1 puff, 1 puff, Inhalation, Daily, Rdo Copeland MD, 1 puff at 07/05/19 0933    haloperidol (HALDOL) oral concentrated solution 1 mg, 1 mg, Oral, Q6H PRN, Jenniffer Pimentel MD    haloperidol lactate (HALDOL) injection 2 mg, 2 mg, Intramuscular, Q6H PRN, Jenniffer Pimentel MD    hydrOXYzine HCL (ATARAX) tablet 25 mg, 25 mg, Oral, Q6H PRN, Mikal Dang MD, 25 mg at 06/30/19 1417    levothyroxine tablet 125 mcg, 125 mcg, Oral, Early Morning, Lynne Donohue MD, 125 mcg at 07/06/19 0530    magnesium hydroxide (MILK OF MAGNESIA) 400 mg/5 mL oral suspension 30 mL, 30 mL, Oral, Daily PRN, Chester Chavis MD    paliperidone (INVEGA) 24 hr tablet 12 mg, 12 mg, Oral, Daily, 12 mg at 07/05/19 0930 **OR** OLANZapine (ZyPREXA) IM injection 10 mg, 10 mg, Intramuscular, Daily, ABILIO Alfred    QUEtiapine (SEROquel XR) 24 hr tablet 150 mg, 150 mg, Oral, HS, 150 mg at 07/05/19 2145 **OR** OLANZapine (ZyPREXA) IM injection 10 mg, 10 mg, Intramuscular, HS, ABILIO Alfred    pantoprazole (PROTONIX) EC tablet 40 mg, 40 mg, Oral, Early Morning, Lynne Donohue MD, 40 mg at 07/06/19 0529    polyethylene glycol (MIRALAX) packet 17 g, 17 g, Oral, Daily PRN, ABILIO Alfred    risperiDONE (RisperDAL M-TABS) dispersible tablet 1 mg, 1 mg, Oral, Q8H PRN, Mikal Dang MD    theophylline (JEF-24) 24 hr capsule 200 mg, 200 mg, Oral, Daily, Lynne Donohue MD, 200 mg at 07/05/19 0930    traZODone (DESYREL) tablet 25 mg, 25 mg, Oral, HS PRN, Lynne Donohue MD    Current Problem List:    Patient Active Problem List   Diagnosis    Sepsis ([facility] )    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[facility] )    Tobacco use disorder, continuous    Bipolar disorder ([address]    Left hip pain    Lactic acidosis    Hypokalemia    Hypomagnesemia    Compression fracture of L4 lumbar vertebra    Thoracic compression fracture (HCC)    Ventral hernia    Parapneumonic effusion    Acute on chronic respiratory failure with hypoxia (HCC)    Chronic respiratory failure (HCC)    Hypophosphatemia    Elevated MCV    Compression deformity of vertebra    Schizoaffective disorder, bipolar type ([facility] )    Acquired hypothyroidism    Gastroesophageal reflux disease without esophagitis    Abnormal CT of the chest    Excessive cerumen in left ear canal    Lipoma of right upper extremity    Polydipsia    Localized swelling of both lower legs       Problem list reviewed 07/06/19     Objective:     Vital Signs:  Vitals:    07/05/19 0647 07/05/19 0900 07/05/19 1533 07/06/19 0708   BP: 109/58  107/65 107/60   BP Location: Right arm  Right arm Left arm   Pulse: 73  67 85   Resp: 17  18 16   Temp: 97 8 °F (36 6 °C)  97 7 °F (36 5 °C) 98 4 °F (36 9 °C)   TempSrc: Tympanic  Tympanic Temporal   SpO2: 93% 93% 96% 97%   Weight:       Height:             Appearance:  age appropriate and casually dressed   Behavior:  normal   Speech:  normal volume   Mood:  constricted   Affect:  constricted   Thought Process:  normal   Thought Content:  normal   Perceptual Disturbances: None   Risk Potential: none   Sensorium:  person, place and time   Cognition:  intact   Consciousness:  alert and awake    Attention: attention span and concentration were age appropriate   Intellect: average   Insight:  limited   Judgment: limited      Motor Activity: no abnormal movements       I/O Past 24 hours:  I/O last 3 completed shifts: In: 1080 [P O :1080]  Out: 1 [Stool:1]  I/O this shift: In: 5 [P O :420]  Out: -         Labs:  Reviewed 07/06/19    Progress Toward Goals:  Unchanged    Assessment / Plan:     Schizoaffective disorder, bipolar type ([facility] )    Recommended Treatment:      Medication changes:  1) continue current treatment plan per primary team    Non-pharmacological treatments  1) Continue with group therapy, milieu therapy and occupational therapy  Safety  1) Safety/communication plan established targeting dynamic risk factors above  2) Risks, benefits, and possible side effects of medications explained to patient and patient verbalizes understanding        Counseling / Coordination of Care    Total floor / unit time spent today 20 minutes  Greater than 50% of total time was spent with the patient and / or family counseling and / or coordination of care  A description of the counseling / coordination of care  Patient's Rights, confidentiality and exceptions to confidentiality, use of automated medical record, [facility] staff access to medical record, and consent to treatment reviewed      Ariela Robles PA-C

## 2019-07-06 NOTE — PROGRESS NOTES
Pt continues to be isolative in her room despite having O2 concentrator  Pt requested O2 concentrator for independence in ambulating and socializing, but refuses to do so except for meal time  Pt continues to be somatic  Every time she hears of a peer having an ailment, pt also acquires said ailment (I e  Pink eye, cataract, difficulty swallowing, asthma attack)  Pt needs frequent redirection and encouragement to get out of her bed and complete basic hygiene  Pt refuses to comb her hair or shower, because she "is too weak" and also refuses to allow RN to assist her  Will continue to encourage pt independence for a healthy recovery

## 2019-07-06 NOTE — PROGRESS NOTES
Patient was in bed sleeping when the shift started  Breathing pattern even and unlabored  On oxygen 2L via NC continuous  Q 7 minutes safety checks ongoing  Will continue to monitor

## 2019-07-06 NOTE — PROGRESS NOTES
Remains isolative to her room  Continues to be  Paranoid and somatic  Medication compliant  2 liters of 02  Vitals stable  Will continue to monitor on q 7 minute checks

## 2019-07-06 NOTE — PROGRESS NOTES
Patient slept all night without any complain and she is presently in bed sleeping  Compliant with her AM medications  Will continue to monitor

## 2019-07-06 NOTE — NURSING NOTE
Pt  Is isolative in her room  She continues to be somatic, want's nurse to listen to her lungs  patient was compliant with her medication

## 2019-07-07 RX ADMIN — LEVOTHYROXINE SODIUM 125 MCG: 125 TABLET ORAL at 05:59

## 2019-07-07 RX ADMIN — PANTOPRAZOLE SODIUM 40 MG: 40 TABLET, DELAYED RELEASE ORAL at 05:59

## 2019-07-07 RX ADMIN — PALIPERIDONE 12 MG: 6 TABLET, EXTENDED RELEASE ORAL at 09:16

## 2019-07-07 RX ADMIN — QUETIAPINE FUMARATE 150 MG: 150 TABLET, EXTENDED RELEASE ORAL at 21:08

## 2019-07-07 RX ADMIN — THEOPHYLLINE ANHYDROUS 200 MG: 200 CAPSULE, EXTENDED RELEASE ORAL at 09:16

## 2019-07-07 RX ADMIN — FLUTICASONE FUROATE AND VILANTEROL TRIFENATATE 1 PUFF: 200; 25 POWDER RESPIRATORY (INHALATION) at 08:17

## 2019-07-07 NOTE — PROGRESS NOTES
Psychiatry Progress Note    Subjective: Interval History     Patient again resting comfortably in her bed during assessment  Remains on continuous oxygen via nasal cannula  Patient reports that she feels she is doing well this morning  Patient reports she feels well rested and that she slept well  Patient stating that she has nothing that she would like to discuss with author  States that she will be meeting with her attending psychiatrist again tomorrow  Patient again minimizing any somatic complaints to [facility] however has continued to be somatically preoccupied with nursing staff; as a result has needed frequent redirection  Medication and meal compliant      Behavior over the last 24 hours:  unchanged  Sleep: normal  Appetite: normal  Medication side effects: No  ROS:  None noted at this time    Current medications:    Current Facility-Administered Medications:     acetaminophen (TYLENOL) tablet 650 mg, 650 mg, Oral, Q6H PRN, Dusty Thacker MD    albuterol (PROVENTIL HFA,VENTOLIN HFA) inhaler 2 puff, 2 puff, Inhalation, Q4H PRN, ABILIO Ivory    aluminum-magnesium hydroxide-simethicone (MYLANTA) 200-200-20 mg/5 mL oral suspension 15 mL, 15 mL, Oral, Q4H PRN, Robby Westbrook MD, 15 mL at 05/12/19 2221    ammonium lactate (LAC-HYDRIN) 12 % lotion, , Topical, BID PRN, ABILIO Ivory, 1 application at 77/18/18 1114    benzonatate (TESSALON PERLES) capsule 100 mg, 100 mg, Oral, TID PRN, Dusty Thacker MD, 100 mg at 06/27/19 0928    benztropine (COGENTIN) injection 1 mg, 1 mg, Intramuscular, Q8H PRN, Toñito Buckley MD    benztropine (COGENTIN) tablet 1 mg, 1 mg, Oral, Q8H PRN, Robby Westbrook MD    enoxaparin (LOVENOX) subcutaneous injection 40 mg, 40 mg, Subcutaneous, Daily, Dusty Thacker MD    fluticasone-vilanterol (BREO ELLIPTA) 200-25 MCG/INH inhaler 1 puff, 1 puff, Inhalation, Daily, Jose Frausto MD, 1 puff at 07/06/19 0813    haloperidol (HALDOL) oral concentrated solution 1 mg, 1 mg, Oral, Q6H PRN, Pita Caldwell MD    haloperidol lactate (HALDOL) injection 2 mg, 2 mg, Intramuscular, Q6H PRN, Pita Caldwell MD    hydrOXYzine HCL (ATARAX) tablet 25 mg, 25 mg, Oral, Q6H PRN, Dick Thayer MD, 25 mg at 06/30/19 1417    levothyroxine tablet 125 mcg, 125 mcg, Oral, Early Morning, Denisha Norris MD, 125 mcg at 07/07/19 0559    magnesium hydroxide (MILK OF MAGNESIA) 400 mg/5 mL oral suspension 30 mL, 30 mL, Oral, Daily PRN, Nilsa Guajardo MD    paliperidone (INVEGA) 24 hr tablet 12 mg, 12 mg, Oral, Daily, 12 mg at 07/06/19 0912 **OR** OLANZapine (ZyPREXA) IM injection 10 mg, 10 mg, Intramuscular, Daily, ABILIO Stringer    QUEtiapine (SEROquel XR) 24 hr tablet 150 mg, 150 mg, Oral, HS, 150 mg at 07/06/19 2125 **OR** OLANZapine (ZyPREXA) IM injection 10 mg, 10 mg, Intramuscular, HS, ABILIO Stringer    pantoprazole (PROTONIX) EC tablet 40 mg, 40 mg, Oral, Early Morning, Denisha Norris MD, 40 mg at 07/07/19 0559    polyethylene glycol (MIRALAX) packet 17 g, 17 g, Oral, Daily PRN, ABILIO Stringer    polyvinyl alcohol (LIQUIFILM TEARS) 1 4 % ophthalmic solution 1 drop, 1 drop, Both Eyes, Q3H PRN, Merry Rivera PA-C    risperiDONE (RisperDAL M-TABS) dispersible tablet 1 mg, 1 mg, Oral, Q8H PRN, Dick Thayer MD    theophylline (JEF-24) 24 hr capsule 200 mg, 200 mg, Oral, Daily, Denisha Norris MD, 200 mg at 07/06/19 0912    traZODone (DESYREL) tablet 25 mg, 25 mg, Oral, HS PRN, Denisha Norris MD    Current Problem List:    Patient Active Problem List   Diagnosis    [facility]    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[facility]    Tobacco use disorder, continuous    Bipolar disorder ([facility]    Left hip pain    Lactic acidosis    Hypokalemia    Hypomagnesemia    Compression fracture of L4 lumbar vertebra    Thoracic compression fracture (HCC)    Ventral hernia    Parapneumonic effusion    Acute on chronic respiratory failure with hypoxia (HCC)    Chronic respiratory failure ([facility]    Hypophosphatemia    Elevated MCV    Compression deformity of vertebra    Schizoaffective disorder, bipolar type (HCC)    Acquired hypothyroidism    Gastroesophageal reflux disease without esophagitis    Abnormal CT of the chest    Excessive cerumen in left ear canal    Lipoma of right upper extremity    Polydipsia    Localized swelling of both lower legs       Problem list reviewed 07/07/19     Objective:     Vital Signs:  Vitals:    07/06/19 0708 07/06/19 1540 07/06/19 2100 07/07/19 0658   BP: 107/60 95/50 110/74 128/63   BP Location: Left arm Left arm Left arm Right arm   Pulse: 85 86 97 66   Resp: 16 16 16 20   Temp: 98 4 °F (36 9 °C) 98 1 °F (36 7 °C) 97 9 °F (36 6 °C) (!) 97 4 °F (36 3 °C)   TempSrc: Temporal Tympanic Tympanic Tympanic   SpO2: 97% 96% 96% 98%   Weight:       Height:             Appearance:  age appropriate and casually dressed   Behavior:  normal   Speech:  normal volume   Mood:  constricted   Affect:  constricted   Thought Process:  normal   Thought Content:  normal   Perceptual Disturbances: None   Risk Potential: none   Sensorium:  person, place and time   Cognition:  intact   Consciousness:  alert and awake    Attention: attention span and concentration were age appropriate   Intellect: average   Insight:  limited   Judgment: limited      Motor Activity: no abnormal movements       I/O Past 24 hours:  I/O last 3 completed shifts: In: 660 [P O :660]  Out: -   No intake/output data recorded  Labs:  Reviewed 07/07/19    Progress Toward Goals:  Unchanged    Assessment / Plan:     Schizoaffective disorder, bipolar type ([facility] )    Recommended Treatment:      Medication changes:  1) continue current treatment plan per primary team    Non-pharmacological treatments  1) Continue with group therapy, milieu therapy and occupational therapy  Safety  1) Safety/communication plan established targeting dynamic risk factors above      2) Risks, benefits, and possible side effects of medications explained to patient and patient verbalizes understanding  Counseling / Coordination of Care    Total floor / unit time spent today 20 minutes  Greater than 50% of total time was spent with the patient and / or family counseling and / or coordination of care  A description of the counseling / coordination of care  Patient's Rights, confidentiality and exceptions to confidentiality, use of automated medical record, [facility] staff access to medical record, and consent to treatment reviewed      Ash Carreon PA-C

## 2019-07-07 NOTE — PROGRESS NOTES
Pt continues to be isolative to self in room  Came out to nurse's station requesting to use phone  Pt is aware that she needs to use the pt phones in the front of the unit  Pt sat in the chair at the nurse's station attempting to manipulate and argue with staff about allowing her to use the phone  Pt eventually returned to her room  At change of shift, pt asked incoming RN about using the phone at the nurse's station and/or to call her brother for her  Pt also stated to RN that she was "being mistreated", but would only specify "by staff" when asked  Will continue to encourage socialization with peers for a healthy recovery

## 2019-07-07 NOTE — PROGRESS NOTES
Patient is in her bed  She appears calm  No distressed noted  2 liters of 02 in use  Will continue to monitor on q 7 minute checks

## 2019-07-07 NOTE — NURSING NOTE
Patient has been isolative to room, awake and pleasant on approach  Visible for short periods during meal time, continues to need encouragement  Stated feeling anxiety and depression related to admission status, no somatic concerns this shift  Remains on continuous O2 at 2L via NC  Patient has been compliant with medications and redirectable  Labs, orders and vital signs reviewed  On routine checks, will continue to monitor

## 2019-07-08 PROBLEM — Z91.19: Status: ACTIVE | Noted: 2019-07-08

## 2019-07-08 PROBLEM — H10.11 ALLERGIC CONJUNCTIVITIS OF RIGHT EYE: Status: ACTIVE | Noted: 2019-07-08

## 2019-07-08 PROCEDURE — 99232 SBSQ HOSP IP/OBS MODERATE 35: CPT | Performed by: FAMILY MEDICINE

## 2019-07-08 PROCEDURE — 99231 SBSQ HOSP IP/OBS SF/LOW 25: CPT | Performed by: NURSE PRACTITIONER

## 2019-07-08 RX ORDER — KETOTIFEN FUMARATE 0.35 MG/ML
1 SOLUTION/ DROPS OPHTHALMIC 2 TIMES DAILY PRN
Status: DISCONTINUED | OUTPATIENT
Start: 2019-07-08 | End: 2019-07-23 | Stop reason: HOSPADM

## 2019-07-08 RX ADMIN — PALIPERIDONE 12 MG: 6 TABLET, EXTENDED RELEASE ORAL at 08:40

## 2019-07-08 RX ADMIN — THEOPHYLLINE ANHYDROUS 200 MG: 200 CAPSULE, EXTENDED RELEASE ORAL at 08:41

## 2019-07-08 RX ADMIN — LEVOTHYROXINE SODIUM 125 MCG: 125 TABLET ORAL at 05:31

## 2019-07-08 RX ADMIN — PANTOPRAZOLE SODIUM 40 MG: 40 TABLET, DELAYED RELEASE ORAL at 05:31

## 2019-07-08 RX ADMIN — FLUTICASONE FUROATE AND VILANTEROL TRIFENATATE 1 PUFF: 200; 25 POWDER RESPIRATORY (INHALATION) at 08:40

## 2019-07-08 RX ADMIN — QUETIAPINE FUMARATE 150 MG: 150 TABLET, EXTENDED RELEASE ORAL at 21:35

## 2019-07-08 NOTE — PROGRESS NOTES
Pt remains somatic  Pt was concerned that her blood glucose level may be low due to not eating a lot at dinner like her room mate  Pt remains paranoid and suspicious  Guarded with care  Pt preoccupied about a soup that she thought was mixed with juice or shell fish from this past Friday  VSS

## 2019-07-08 NOTE — PROGRESS NOTES
Progress Note - Behavioral Health   Melquiades Dick 60 y o  female MRN: [id number]  Unit/Bed#: [facility] 208-71 Encounter: [id number]    The patient was seen for continuing care and reviewed with treatment team  Staff reports that the patient remains somatic, self isolating, and superficial with issues  Remains compliant with medications with encouragement and continues to refuse groups  During assessment the patient remains preoccupied with her treatment plan stating, "I am willing to go to [facility] now  I couldn't make that decision prior because I couldn't see out of my one eye " Denies any current thoughts to hurt herself or others  Denies any auditory or visual hallucinations  Continues to remain paranoid and suspicious  Reports that her appetite and sleep remains adequate  Superficial and guarded regarding psychiatric symptoms during assessment  Continues to attempt to staff split  Needs frequent redirection and limit setting  No current medication changes   Currently awaiting [facility] bed    Mental Status Evaluation:  Appearance:  Marginal/poor hygiene   Behavior:  guarded and Superficial   Fund of knowledge  aware of current events and Aware of past history   Speech:   Language: Normal rate and Normal volume  No overt abnormality   Mood:  irritable   Affect:   Associations: blunted  Intact   Thought Process:  Circumstantial   Thought Content:  Paranoid and mistrustful, Delusions of persecution, Obsessive ruminations and Somatic delusions   Perceptual Disturbances: Denies hallucinations and does not appear to be responding to internal stimuli   Risk Potential: No suicidal or homicidal ideation   Orientation  Oriented x 3   Memory No deficits   Attention/Concentration attention span appeared shorter than expected for age   Insight:  No insight   Judgment: Poor judgment   Gait/Station: normal gait/station and normal balance   Motor Activity: No abnormal movement noted     Progress Toward Goals: slightly improving    Assessment/Plan    Principal Problem:    Schizoaffective disorder, bipolar type (HCC)  Active Problems:    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[address] )    Acquired hypothyroidism    Gastroesophageal reflux disease without esophagitis    Noncompliant with deep vein thrombosis (DVT) prophylaxis    Allergic conjunctivitis of right eye      Recommended Treatment: Continue with pharmacotherapy, group therapy, milieu therapy and occupational therapy    The patient will be maintained on the following medications:    Current Facility-Administered Medications:  acetaminophen 650 mg Oral Q6H PRN Hollie Rubin MD   albuterol 2 puff Inhalation Q4H PRN [place] Brenden, CRNP   aluminum-magnesium hydroxide-simethicone 15 mL Oral Q4H PRN Rosa Arroyo MD   ammonium lactate  Topical BID PRN Thekarine Wilcox, CRNP   benzonatate 100 mg Oral TID PRN Hollie Rubin MD   benztropine 1 mg Intramuscular Q8H PRN Rhiannon Mcmillan MD   benztropine 1 mg Oral Q8H PRN Rosa Arroyo MD   fluticasone-vilanterol 1 puff Inhalation Daily Josh Gonzalez MD   haloperidol 1 mg Oral Q6H PRN Rhiannon Mcmillan MD   haloperidol lactate 2 mg Intramuscular Q6H PRN Rhiannon Mcmillan MD   hydrOXYzine HCL 25 mg Oral Q6H PRN Rosa Arroyo MD   ketotifen 1 drop Right Eye BID PRN Zion Bailey MD   levothyroxine 125 mcg Oral Early Morning Hollie Rubin MD   magnesium hydroxide 30 mL Oral Daily PRN Uday Lewis MD   paliperidone 12 mg Oral Daily [place] Brenden, CRNP   Or       OLANZapine 10 mg Intramuscular Daily [place] Brenden, CRNP   QUEtiapine 150 mg Oral HS The[place] Brenden, CRNP   Or       OLANZapine 10 mg Intramuscular HS The[place] Brenden, CRNP   pantoprazole 40 mg Oral Early Morning Hollie Rubin MD   polyethylene glycol 17 g Oral Daily PRN [place] Brenden, CRNP   polyvinyl alcohol 1 drop Both Eyes Q3H PRN Merry Rivera PA-C   risperiDONE 1 mg Oral Q8H PRN Rosa Arroyo MD   theophylline 200 mg Oral Daily Hollie Rubin MD   traZODone 25 mg Oral HS PRN Manjinder Lambert MD Prabhakar       Risks, benefits and possible side effects of Medications:   Risks, benefits, and possible side effects of medications explained to patient and patient verbalizes understanding

## 2019-07-08 NOTE — PROGRESS NOTES
Patient is alert and oriented times 4  Is pleasant and cooperative with assessment  Remains isolative to her room  Denies SI/HI or hallucinations  Reports good sleep last night  Offers complaint for her left eye being watery with a more blurred vision  Patient things her cataract has been worsening   SLIM will be made aware about eye issue  Patient using O2 at 2 L via NC  Patient informs that her humor hasn't changed in the last days  Is compliant with medications and meals  Refusing groups  Encouraged for care plan compliance  Will keep monitoring for support

## 2019-07-08 NOTE — PLAN OF CARE
Problem: Alteration in Thoughts and Perception  Goal: Treatment Goal: Gain control of psychotic behaviors/thinking, reduce/eliminate presenting symptoms and demonstrate improved reality functioning upon discharge  Outcome: Progressing  Goal: Verbalize thoughts and feelings  Description  Interventions:  - Promote a nonjudgmental and trusting relationship with the patient through active listening and therapeutic communication  - Assess patient's level of functioning, behavior and potential for risk  - Engage patient in 1 on 1 interactions for a minimum of 15 minutes each session  - Encourage patient to express fears, feelings, frustrations, and discuss symptoms    - [place] patient to reality, help patient recognize reality-based thinking   - Administer medications as ordered and assess for potential side effects  - Provide the patient education related to the signs and symptoms of the illness and desired effects of prescribed medications  Outcome: Progressing  Goal: Refrain from acting on delusional thinking/internal stimuli  Description  Interventions:  - Monitor patient closely, per order   - Utilize least restrictive measures   - Set reasonable limits, give positive feedback for acceptable   - Administer medications as ordered and monitor of potential side effects  Outcome: Progressing  Goal: Agree to be compliant with medication regime, as prescribed and report medication side effects  Description  Interventions:  - Offer appropriate PRN medication and supervise ingestion; conduct aims, as needed   Outcome: Progressing  Goal: Recognize dysfunctional thoughts, communicate reality-based thoughts at the time of discharge  Description  Interventions:  - Provide medication and psycho-education to assist patient in compliance and developing insight into his/her illness   Outcome: Progressing  Goal: Complete daily ADLs, including personal hygiene independently, as able  Description  Interventions:  - Observe, teach, and assist patient with ADLS  - Monitor and promote a balance of rest/activity, with adequate nutrition and elimination   Outcome: Progressing     Problem: Risk for Self Injury/Neglect  Goal: Treatment Goal: Remain safe during length of stay, learn and adopt new coping skills, and be free of self-injurious ideation, impulses and acts at the time of discharge  Outcome: Progressing  Goal: Verbalize thoughts and feelings  Description  Interventions:  - Assess and re-assess patient's lethality and potential for self-injury  - Engage patient in 1:1 interactions, daily, for a minimum of 15 minutes  - Encourage patient to express feelings, fears, frustrations, hopes  - Establish rapport/trust with patient   Outcome: Progressing  Goal: Refrain from harming self  Description  Interventions:  - Monitor patient closely, per order  - Develop a trusting relationship  - Supervise medication ingestion, monitor effects and side effects   Outcome: Progressing  Goal: Recognize maladaptive responses and adopt new coping mechanisms  Outcome: Progressing  Goal: Complete daily ADLs, including personal hygiene independently, as able  Description  Interventions:  - Observe, teach, and assist patient with ADLS  - Monitor and promote a balance of rest/activity, with adequate nutrition and elimination  Outcome: Progressing     Problem: Depression  Goal: Treatment Goal: Demonstrate behavioral control of depressive symptoms, verbalize feelings of improved mood/affect, and adopt new coping skills prior to discharge  Outcome: Progressing  Goal: Verbalize thoughts and feelings  Description  Interventions:  - Assess and re-assess patient's level of risk   - Engage patient in 1:1 interactions, daily, for a minimum of 15 minutes   - Encourage patient to express feelings, fears, frustrations, hopes   Outcome: Progressing  Goal: Refrain from harming self  Description  Interventions:  - Monitor patient closely, per order   - Supervise medication ingestion, monitor effects and side effects   Outcome: Progressing  Goal: Refrain from isolation  Description  Interventions:  - Develop a trusting relationship   - Encourage socialization   Outcome: Progressing  Goal: Refrain from self-neglect  Outcome: Progressing  Goal: Complete daily ADLs, including personal hygiene independently, as able  Description  Interventions:  - Observe, teach, and assist patient with ADLS  -  Monitor and promote a balance of rest/activity, with adequate nutrition and elimination   Outcome: Progressing     Problem: Anxiety  Goal: Anxiety is at manageable level  Description  Interventions:  - Assess and monitor patient's anxiety level  - Monitor for signs and symptoms of anxiety both physical and emotional (heart palpitations, chest pain, shortness of breath, headaches, nausea, feeling jumpy, restlessness, irritable, apprehensive)  - Collaborate with interdisciplinary team and initiate plan and interventions as ordered    - [place] patient to unit/surroundings  - Explain treatment plan  - Encourage participation in care  - Encourage verbalization of concerns/fears  - Identify coping mechanisms  - Assist in developing anxiety-reducing skills  - Administer/offer alternative therapies  - Limit or eliminate stimulants  Outcome: Progressing     Problem: Prexisting or High Potential for Compromised Skin Integrity  Goal: Skin integrity is maintained or improved  Description  INTERVENTIONS:  - Identify patients at risk for skin breakdown  - Assess and monitor skin integrity  - Assess and monitor nutrition and hydration status  - Monitor labs (i e  albumin)  - Assess for incontinence   - Turn and reposition patient  - Assist with mobility/ambulation  - Relieve pressure over bony prominences  - Avoid friction and shearing  - Provide appropriate hygiene as needed including keeping skin clean and dry  - Evaluate need for skin moisturizer/barrier cream  - Collaborate with interdisciplinary team (i e  Nutrition, Rehabilitation, etc )   - Patient/family teaching  Outcome: Progressing

## 2019-07-08 NOTE — PROGRESS NOTES
07/08/19 0900   Team Meeting   Meeting Type Daily Rounds   Team Members Present   Team Members Present Physician;Nurse;;Occupational Therapist   Physician Team Member Dr Lopez Jalloh Team Member Sandy AMARO, Donato Cage [facility] Management Team Member Benjamin Elizabeth    OT Team Member Hazel Tobias Pt discussed at treatment rounds today, continue to await EAC bed, no medication changes made today

## 2019-07-08 NOTE — CASE MANAGEMENT
Patient continues to be somatic and staff splitting  Patient believes that she is going to return to [address] prior to [facility]  Patient believes  [facility] will not be a good match for her as she doesn't need psychiatric help  Patient remains isolative to room  CM will continue to follow and provide services as needed

## 2019-07-08 NOTE — PROGRESS NOTES
07/08/19 1000   Service   Service Family Practice   Provider Name Claudioker Baumgarten   Multi-disciplinary Rounds   Diagnosis    ( right watering with blurred vision)   Pending Pathology and Pertinent Tests Comment section  (N/A)   Vital Signs Reviewed   Nutrition  Reviewed   Patient Education Other (comment)  (EYE CARE )   Level of care Provider to update level of care

## 2019-07-08 NOTE — CONSULTS
Inpatient Consultation - [facility]    Patient Information: Amberly Lowe 60 y o  female MRN: [id number]  Unit/Bed#: [facility] 964-74 Encounter: [id number]  PCP: César Villareal MD  Date of Admission:  5/5/2019  Date of Consultation: 07/08/19  Requesting Physician: Dat Zeng MD    Reason For Consultation:   Medical Management    Assessment/Plan:    Allergic conjunctivitis of right eye  Assessment & Plan  Will give antihistamine eye drops  Noncompliant with deep vein thrombosis (DVT) prophylaxis  Assessment & Plan  Patient refused lovenox, however she is currently ambulatory and low risk  Will DC lovenox  Gastroesophageal reflux disease without esophagitis  Assessment & Plan  - Stable   - Continue protonix 40 mg daily with mylanta PRN     Acquired hypothyroidism  Assessment & Plan  - Continue levothyroxine 125 mcg daily     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[facility] [address] )  Assessment & Plan  - Longstanding history of COPD with asthma and is chronically on oxygen therapy at 3L  - Continue Breo Ellipta and Theophylline daily with albuterol inhalers as needed for wheezing  * Schizoaffective disorder, bipolar type [facility])  Assessment & Plan  - Management per psychiatry         VTE Prophylaxis: Reason for no pharmacologic prophylaxis ambulatory/low risk  / reason for no mechanical VTE prophylaxis ambulatory/low risk     Recommendations for Discharge:  · FU [address] / Coordination of Care Time: 20 minutes  Greater than 50% of total time spent on patient counseling and coordination of care  Collaboration of Care: Were Recommendations Directly Discussed with Primary Treatment Team? - Yes      Subjective:  Patient seen and examined, c/o right eye itching and mild discharge  Denies headache, chills, fever, sick contacts, chest pain, cough, wheezing, ab pain, nausea, diarrhea, dysuria, calf pain  Review of Systems:    Review of Systems   Constitutional: Negative for chills and fever  HENT: Negative for ear pain and sore throat  Eyes: Positive for discharge and itching  Negative for pain and redness  Respiratory: Negative for cough and shortness of breath  Cardiovascular: Negative for chest pain, palpitations and leg swelling  Gastrointestinal: Negative for abdominal pain, diarrhea and nausea  Genitourinary: Negative for dysuria and hematuria  Musculoskeletal: Negative for back pain and neck pain  Neurological: Negative for dizziness and headaches  Psychiatric/Behavioral: Negative for dysphoric mood  The patient is not nervous/anxious  Past Medical and Surgical History:   Past Medical History:   Diagnosis Date    Acid reflux     Anxiety     Asthma     Bipolar 1 disorder ([facility])     Chronic pain disorder     Chronic respiratory failure ([facility])     Compression fracture of fourth lumbar vertebra ([facility])     COPD (chronic obstructive pulmonary disease) ([facility])     Depression     GERD (gastroesophageal reflux disease)     History of home oxygen therapy     Hypothyroidism     Lipoma of upper extremity     Psychiatric illness     Schizoaffective disorder ([facility])     Substance abuse ([facility])     Nicotine    Thoracic compression fracture ([facility])     Ventral hernia      No past surgical history on file  Meds/Allergies: Allergies: Allergies   Allergen Reactions    Peanut-Containing Drug Products Anaphylaxis    Shellfish-Derived Products Anaphylaxis    Antihistamines, Chlorpheniramine-Type     Aspirin     Atrovent [Ipratropium]     Elavil [Amitriptyline]     Iodine      Other reaction(s): Unknown Reaction    Levaquin [Levofloxacin]     Novocain [Procaine]     Penicillins      Able to tolerate azithromycin and vancomycin    Prolixin [Fluphenazine]     Sulfa Antibiotics Other (See Comments)     Unknown Zithromax-Tight Throat     Prior to Admission Medications   Prescriptions Last Dose Informant Patient Reported? Taking?    EPINEPHrine (EPIPEN JR) 0 15 mg/0 3 mL SOAJ Unknown at Unknown time  No No   Sig: Inject 0 3 mL (0 15 mg total) into a muscle once as needed for anaphylaxis As needed for allergic reaction   OXYGEN-HELIUM IN 5/5/2019 at Unknown time  Yes Yes   Sig: Inhale 3 L    acetaminophen (TYLENOL) 325 mg tablet Unknown at Unknown time  No No   Sig: Take 2 tablets (650 mg total) by mouth every 6 (six) hours as needed for mild pain   albuterol (PROVENTIL HFA,VENTOLIN HFA) 90 mcg/act inhaler Unknown at Unknown time  No No   Sig: Inhale 2 puffs every 4 (four) hours as needed for wheezing   fluticasone-salmeterol (ADVAIR DISKUS, WIXELA INHUB) 250-50 mcg/dose inhaler Unknown at Unknown time  No No   Sig: Inhale 1 puff 2 (two) times a day Rinse mouth after use     levothyroxine 125 mcg tablet Unknown at Unknown time  No No   Sig: Take 1 tablet (125 mcg total) by mouth daily   pantoprazole (PROTONIX) 20 mg tablet Unknown at Unknown time  No No   Sig: Take 1 tablet (20 mg total) by mouth daily   theophylline (JEF-24) 200 MG 24 hr capsule Unknown at Unknown time  No No   Sig: Take 1 capsule (200 mg total) by mouth daily      Facility-Administered Medications: None     Social History:     Social History     Socioeconomic History    Marital status: Single     Spouse name: Not on file    Number of children: Not on file    Years of education: Not on file    Highest education level: Not on file   Occupational History    Not on file   Social Needs    Financial resource strain: Not on file    Food insecurity:     Worry: Not on file     Inability: Not on file    Transportation needs:     Medical: Not on file     Non-medical: Not on file   Tobacco Use    Smoking status: Current Every Day Smoker     Packs/day: 0 20     Types: Cigarettes    Smokeless tobacco: Never Used   Substance and Sexual Activity    Alcohol use: Never     Frequency: Never     Binge frequency: Never    Drug use: No    Sexual activity: Not Currently   Lifestyle    Physical activity:     Days per week: Not on file     Minutes per session: Not on file    Stress: Not on file   Relationships    Social connections:     Talks on phone: Not on file     Gets together: Not on file     Attends [employer] service: Not on file     Active member of club or organization: Not on file     Attends meetings of clubs or organizations: Not on file     Relationship status: Not on file    Intimate partner violence:     Fear of current or ex partner: Not on file     Emotionally abused: Not on file     Physically abused: Not on file     Forced sexual activity: Not on file   Other Topics Concern    Not on file   Social History Narrative    Not on file     Patient Pre-hospital Living Situation: Home  Patient Pre-hospital Level of Mobility: Ambulatory  Patient Pre-hospital Diet Restrictions: none    Family History:  Family History   Problem Relation Age of Onset    Arthritis Mother        Physical Exam:     Vitals:   Blood Pressure: 119/60 (07/08/19 0652)  Pulse: 68 (07/08/19 0652)  Temperature: 97 8 °F (36 6 °C) (07/08/19 0652)  Temp Source: Temporal (07/08/19 6999)  Respirations: 17 (07/08/19 0652)  Height: 5' 4" (162 6 cm) (05/13/19 1528)  Weight - Scale: 66 5 kg (146 lb 9 7 oz) (07/02/19 0902)  SpO2: 97 % (07/08/19 2588)    Physical Exam   Constitutional: She is oriented to person, place, and time  She appears well-developed and well-nourished  HENT:   Head: Normocephalic and atraumatic  Right Ear: External ear normal    Left Ear: External ear normal    Nose: Nose normal    Mouth/Throat: Oropharynx is clear and moist    Eyes: Pupils are equal, round, and reactive to light  Conjunctivae and EOM are normal  Right eye exhibits discharge (minimal yellow)  Left eye exhibits no discharge  Neck: Normal range of motion  Neck supple  Cardiovascular: Normal rate, regular rhythm, normal heart sounds and intact distal pulses  Pulmonary/Chest: Effort normal  She has no wheezes  Breath sounds decreased throughout   Abdominal: Soft   Bowel sounds are normal  There is no tenderness  Musculoskeletal: Normal range of motion  She exhibits no edema or tenderness  Lymphadenopathy:     She has no cervical adenopathy  Neurological: She is alert and oriented to person, place, and time  Skin: Skin is warm and dry  Psychiatric: She has a normal mood and affect  Her behavior is normal           Additional Data:     Lab Results: I have personally reviewed pertinent reports  Invalid input(s): LABALBU        Imaging: I have personally reviewed pertinent reports  No results found  ** Please Note: This note has been constructed using a voice recognition system   Tianna Coleman MD  07/08/19  11:54 AM

## 2019-07-08 NOTE — PROGRESS NOTES
Currently, patient is observed resting in her bed comfortably with eyes closed  Patient remains on continuous O 2 @ 2 L via NC  No s/s of acute respiratory distress noted  Will continue to monitor patient

## 2019-07-08 NOTE — PLAN OF CARE
Problem: Alteration in Thoughts and Perception  Goal: Treatment Goal: Gain control of psychotic behaviors/thinking, reduce/eliminate presenting symptoms and demonstrate improved reality functioning upon discharge  Outcome: Progressing  Goal: Verbalize thoughts and feelings  Description  Interventions:  - Promote a nonjudgmental and trusting relationship with the patient through active listening and therapeutic communication  - Assess patient's level of functioning, behavior and potential for risk  - Engage patient in 1 on 1 interactions for a minimum of 15 minutes each session  - Encourage patient to express fears, feelings, frustrations, and discuss symptoms    - [place] patient to reality, help patient recognize reality-based thinking   - Administer medications as ordered and assess for potential side effects  - Provide the patient education related to the signs and symptoms of the illness and desired effects of prescribed medications  Outcome: Progressing  Goal: Refrain from acting on delusional thinking/internal stimuli  Description  Interventions:  - Monitor patient closely, per order   - Utilize least restrictive measures   - Set reasonable limits, give positive feedback for acceptable   - Administer medications as ordered and monitor of potential side effects  Outcome: Progressing  Goal: Agree to be compliant with medication regime, as prescribed and report medication side effects  Description  Interventions:  - Offer appropriate PRN medication and supervise ingestion; conduct aims, as needed   Outcome: Progressing  Goal: Recognize dysfunctional thoughts, communicate reality-based thoughts at the time of discharge  Description  Interventions:  - Provide medication and psycho-education to assist patient in compliance and developing insight into his/her illness   Outcome: Progressing  Goal: Complete daily ADLs, including personal hygiene independently, as able  Description  Interventions:  - Observe, teach, and assist patient with ADLS  - Monitor and promote a balance of rest/activity, with adequate nutrition and elimination   Outcome: Progressing     Problem: Ineffective Coping  Goal: Identifies ineffective coping skills  Outcome: Progressing  Goal: Identifies healthy coping skills  Outcome: Progressing  Goal: Demonstrates healthy coping skills  Outcome: Progressing  Goal: Patient/Family participate in treatment and DC plans  Description  Interventions:  - Provide therapeutic environment  Outcome: Progressing  Goal: Patient/Family verbalizes awareness of resources  Outcome: Progressing  Goal: Understands least restrictive measures  Description  Interventions:  - Utilize least restrictive behavior  Outcome: Progressing  Goal: Free from restraint events  Description  - Utilize least restrictive measures   - Provide behavioral interventions   - Redirect inappropriate behaviors   Outcome: Progressing     Problem: Risk for Self Injury/Neglect  Goal: Treatment Goal: Remain safe during length of stay, learn and adopt new coping skills, and be free of self-injurious ideation, impulses and acts at the time of discharge  Outcome: Progressing  Goal: Verbalize thoughts and feelings  Description  Interventions:  - Assess and re-assess patient's lethality and potential for self-injury  - Engage patient in 1:1 interactions, daily, for a minimum of 15 minutes  - Encourage patient to express feelings, fears, frustrations, hopes  - Establish rapport/trust with patient   Outcome: Progressing  Goal: Refrain from harming self  Description  Interventions:  - Monitor patient closely, per order  - Develop a trusting relationship  - Supervise medication ingestion, monitor effects and side effects   Outcome: Progressing  Goal: Attend and participate in unit activities, including therapeutic, recreational, and educational groups  Description  Interventions:  - Provide therapeutic and educational activities daily, encourage attendance and participation, and document same in the medical record  - Obtain collateral information, encourage visitation and family involvement in care   Outcome: Progressing  Goal: Complete daily ADLs, including personal hygiene independently, as able  Description  Interventions:  - Observe, teach, and assist patient with ADLS  - Monitor and promote a balance of rest/activity, with adequate nutrition and elimination  Outcome: Progressing     Problem: Depression  Goal: Treatment Goal: Demonstrate behavioral control of depressive symptoms, verbalize feelings of improved mood/affect, and adopt new coping skills prior to discharge  Outcome: Progressing  Goal: Verbalize thoughts and feelings  Description  Interventions:  - Assess and re-assess patient's level of risk   - Engage patient in 1:1 interactions, daily, for a minimum of 15 minutes   - Encourage patient to express feelings, fears, frustrations, hopes   Outcome: Progressing  Goal: Refrain from harming self  Description  Interventions:  - Monitor patient closely, per order   - Supervise medication ingestion, monitor effects and side effects   Outcome: Progressing  Goal: Refrain from isolation  Description  Interventions:  - Develop a trusting relationship   - Encourage socialization   Outcome: Progressing  Goal: Refrain from self-neglect  Outcome: Progressing  Goal: Attend and participate in unit activities, including therapeutic, recreational, and educational groups  Description  Interventions:  - Provide therapeutic and educational activities daily, encourage attendance and participation, and document same in the medical record   Outcome: Progressing  Goal: Complete daily ADLs, including personal hygiene independently, as able  Description  Interventions:  - Observe, teach, and assist patient with ADLS  -  Monitor and promote a balance of rest/activity, with adequate nutrition and elimination   Outcome: Progressing     Problem: Anxiety  Goal: Anxiety is at manageable level  Description  Interventions:  - Assess and monitor patient's anxiety level  - Monitor for signs and symptoms of anxiety both physical and emotional (heart palpitations, chest pain, shortness of breath, headaches, nausea, feeling jumpy, restlessness, irritable, apprehensive)  - Collaborate with interdisciplinary team and initiate plan and interventions as ordered    - [place] patient to unit/surroundings  - Explain treatment plan  - Encourage participation in care  - Encourage verbalization of concerns/fears  - Identify coping mechanisms  - Assist in developing anxiety-reducing skills  - Administer/offer alternative therapies  - Limit or eliminate stimulants  Outcome: Progressing     Problem: DISCHARGE PLANNING - CARE MANAGEMENT  Goal: Discharge to post-acute care or home with appropriate resources  Description  INTERVENTIONS:  - Conduct assessment to determine patient/family and health care team treatment goals, and need for post-acute services based on payer coverage, community resources, and patient preferences, and barriers to discharge  - Address psychosocial, clinical, and financial barriers to discharge as identified in assessment in conjunction with the patient/family and health care team  - Arrange appropriate level of post-acute services according to patients   needs and preference and payer coverage in collaboration with the physician and health care team  - Communicate with and update the patient/family, physician, and health care team regarding progress on the discharge plan  - Arrange appropriate transportation to post-acute venues  Outcome: Progressing     Problem: Prexisting or High Potential for Compromised Skin Integrity  Goal: Skin integrity is maintained or improved  Description  INTERVENTIONS:  - Identify patients at risk for skin breakdown  - Assess and monitor skin integrity  - Assess and monitor nutrition and hydration status  - Monitor labs (i e  albumin)  - Assess for incontinence - Turn and reposition patient  - Assist with mobility/ambulation  - Relieve pressure over bony prominences  - Avoid friction and shearing  - Provide appropriate hygiene as needed including keeping skin clean and dry  - Evaluate need for skin moisturizer/barrier cream  - Collaborate with interdisciplinary team (i e  Nutrition, Rehabilitation, etc )   - Patient/family teaching  Outcome: Progressing     Problem: Alteration in Thoughts and Perception  Goal: Attend and participate in unit activities, including therapeutic, recreational, and educational groups  Description  Interventions:  - Provide therapeutic and educational activities daily, encourage attendance and participation, and document same in the medical record   Outcome: Not Progressing     Problem: Ineffective Coping  Goal: Participates in unit activities  Description  Interventions:  - Provide therapeutic environment   - Provide required programming   - Redirect inappropriate behaviors   Outcome: Not Progressing     Problem: Risk for Self Injury/Neglect  Goal: Recognize maladaptive responses and adopt new coping mechanisms  Outcome: Not Progressing

## 2019-07-09 PROCEDURE — 99232 SBSQ HOSP IP/OBS MODERATE 35: CPT | Performed by: NURSE PRACTITIONER

## 2019-07-09 RX ADMIN — THEOPHYLLINE ANHYDROUS 200 MG: 200 CAPSULE, EXTENDED RELEASE ORAL at 08:12

## 2019-07-09 RX ADMIN — QUETIAPINE FUMARATE 150 MG: 150 TABLET, EXTENDED RELEASE ORAL at 21:11

## 2019-07-09 RX ADMIN — FLUTICASONE FUROATE AND VILANTEROL TRIFENATATE 1 PUFF: 200; 25 POWDER RESPIRATORY (INHALATION) at 08:12

## 2019-07-09 RX ADMIN — PANTOPRAZOLE SODIUM 40 MG: 40 TABLET, DELAYED RELEASE ORAL at 05:52

## 2019-07-09 RX ADMIN — PALIPERIDONE 12 MG: 6 TABLET, EXTENDED RELEASE ORAL at 08:12

## 2019-07-09 RX ADMIN — LEVOTHYROXINE SODIUM 125 MCG: 125 TABLET ORAL at 05:52

## 2019-07-09 NOTE — PROGRESS NOTES
Awake, alert, oriented  Pleasant and cooperative on approach at this time  Medication compliant  Pt not voicing any suicidal or homicidal thoughts at this time  Not voicing any delusions at this time  Will continue to monitor

## 2019-07-09 NOTE — PLAN OF CARE
Problem: Alteration in Thoughts and Perception  Goal: Treatment Goal: Gain control of psychotic behaviors/thinking, reduce/eliminate presenting symptoms and demonstrate improved reality functioning upon discharge  Outcome: Progressing  Goal: Verbalize thoughts and feelings  Description  Interventions:  - Promote a nonjudgmental and trusting relationship with the patient through active listening and therapeutic communication  - Assess patient's level of functioning, behavior and potential for risk  - Engage patient in 1 on 1 interactions for a minimum of 15 minutes each session  - Encourage patient to express fears, feelings, frustrations, and discuss symptoms    - [place] patient to reality, help patient recognize reality-based thinking   - Administer medications as ordered and assess for potential side effects  - Provide the patient education related to the signs and symptoms of the illness and desired effects of prescribed medications  Outcome: Progressing  Goal: Refrain from acting on delusional thinking/internal stimuli  Description  Interventions:  - Monitor patient closely, per order   - Utilize least restrictive measures   - Set reasonable limits, give positive feedback for acceptable   - Administer medications as ordered and monitor of potential side effects  Outcome: Progressing  Goal: Agree to be compliant with medication regime, as prescribed and report medication side effects  Description  Interventions:  - Offer appropriate PRN medication and supervise ingestion; conduct aims, as needed   Outcome: Progressing  Goal: Attend and participate in unit activities, including therapeutic, recreational, and educational groups  Description  Interventions:  - Provide therapeutic and educational activities daily, encourage attendance and participation, and document same in the medical record   Outcome: Progressing  Goal: Recognize dysfunctional thoughts, communicate reality-based thoughts at the time of discharge  Description  Interventions:  - Provide medication and psycho-education to assist patient in compliance and developing insight into his/her illness   Outcome: Progressing  Goal: Complete daily ADLs, including personal hygiene independently, as able  Description  Interventions:  - Observe, teach, and assist patient with ADLS  - Monitor and promote a balance of rest/activity, with adequate nutrition and elimination   Outcome: Progressing     Problem: Ineffective Coping  Goal: Identifies ineffective coping skills  Outcome: Progressing  Goal: Identifies healthy coping skills  Outcome: Progressing  Goal: Demonstrates healthy coping skills  Outcome: Progressing  Goal: Participates in unit activities  Description  Interventions:  - Provide therapeutic environment   - Provide required programming   - Redirect inappropriate behaviors   Outcome: Progressing  Goal: Patient/Family participate in treatment and DC plans  Description  Interventions:  - Provide therapeutic environment  Outcome: Progressing  Goal: Patient/Family verbalizes awareness of resources  Outcome: Progressing  Goal: Understands least restrictive measures  Description  Interventions:  - Utilize least restrictive behavior  Outcome: Progressing  Goal: Free from restraint events  Description  - Utilize least restrictive measures   - Provide behavioral interventions   - Redirect inappropriate behaviors   Outcome: Progressing     Problem: Risk for Self Injury/Neglect  Goal: Treatment Goal: Remain safe during length of stay, learn and adopt new coping skills, and be free of self-injurious ideation, impulses and acts at the time of discharge  Outcome: Progressing  Goal: Verbalize thoughts and feelings  Description  Interventions:  - Assess and re-assess patient's lethality and potential for self-injury  - Engage patient in 1:1 interactions, daily, for a minimum of 15 minutes  - Encourage patient to express feelings, fears, frustrations, hopes  - Establish rapport/trust with patient   Outcome: Progressing  Goal: Refrain from harming self  Description  Interventions:  - Monitor patient closely, per order  - Develop a trusting relationship  - Supervise medication ingestion, monitor effects and side effects   Outcome: Progressing  Goal: Attend and participate in unit activities, including therapeutic, recreational, and educational groups  Description  Interventions:  - Provide therapeutic and educational activities daily, encourage attendance and participation, and document same in the medical record  - Obtain collateral information, encourage visitation and family involvement in care   Outcome: Progressing  Goal: Recognize maladaptive responses and adopt new coping mechanisms  Outcome: Progressing  Goal: Complete daily ADLs, including personal hygiene independently, as able  Description  Interventions:  - Observe, teach, and assist patient with ADLS  - Monitor and promote a balance of rest/activity, with adequate nutrition and elimination  Outcome: Progressing     Problem: Depression  Goal: Treatment Goal: Demonstrate behavioral control of depressive symptoms, verbalize feelings of improved mood/affect, and adopt new coping skills prior to discharge  Outcome: Progressing  Goal: Verbalize thoughts and feelings  Description  Interventions:  - Assess and re-assess patient's level of risk   - Engage patient in 1:1 interactions, daily, for a minimum of 15 minutes   - Encourage patient to express feelings, fears, frustrations, hopes   Outcome: Progressing  Goal: Refrain from harming self  Description  Interventions:  - Monitor patient closely, per order   - Supervise medication ingestion, monitor effects and side effects   Outcome: Progressing  Goal: Refrain from isolation  Description  Interventions:  - Develop a trusting relationship   - Encourage socialization   Outcome: Progressing  Goal: Refrain from self-neglect  Outcome: Progressing  Goal: Attend and participate in unit activities, including therapeutic, recreational, and educational groups  Description  Interventions:  - Provide therapeutic and educational activities daily, encourage attendance and participation, and document same in the medical record   Outcome: Progressing  Goal: Complete daily ADLs, including personal hygiene independently, as able  Description  Interventions:  - Observe, teach, and assist patient with ADLS  -  Monitor and promote a balance of rest/activity, with adequate nutrition and elimination   Outcome: Progressing     Problem: Anxiety  Goal: Anxiety is at manageable level  Description  Interventions:  - Assess and monitor patient's anxiety level  - Monitor for signs and symptoms of anxiety both physical and emotional (heart palpitations, chest pain, shortness of breath, headaches, nausea, feeling jumpy, restlessness, irritable, apprehensive)  - Collaborate with interdisciplinary team and initiate plan and interventions as ordered    - [place] patient to unit/surroundings  - Explain treatment plan  - Encourage participation in care  - Encourage verbalization of concerns/fears  - Identify coping mechanisms  - Assist in developing anxiety-reducing skills  - Administer/offer alternative therapies  - Limit or eliminate stimulants  Outcome: Progressing     Problem: Prexisting or High Potential for Compromised Skin Integrity  Goal: Skin integrity is maintained or improved  Description  INTERVENTIONS:  - Identify patients at risk for skin breakdown  - Assess and monitor skin integrity  - Assess and monitor nutrition and hydration status  - Monitor labs (i e  albumin)  - Assess for incontinence   - Turn and reposition patient  - Assist with mobility/ambulation  - Relieve pressure over bony prominences  - Avoid friction and shearing  - Provide appropriate hygiene as needed including keeping skin clean and dry  - Evaluate need for skin moisturizer/barrier cream  - Collaborate with interdisciplinary team (i e  Nutrition, Rehabilitation, etc )   - Patient/family teaching  Outcome: Progressing

## 2019-07-09 NOTE — PROGRESS NOTES
Progress Note - Behavioral Health   Avni Garnett 60 y o  female MRN: [id number]  Unit/Bed#: [facility] 926-24 Encounter: [id number]    The patient was seen for continuing care and reviewed with treatment team  The staff reports that the patient continues to remain self isolating, somatic, and preoccupied with her medications and discharge plan  Continues to refuse groups  Remains compliant with medications  During assessment the patient remains somatically preoccupied, tangential in conversation, and focused on her discharge plan and medications  Continues to split staff and needs frequent limit setting  However, patient is pleasant and cooperative on approach  Denies any thoughts to hurt herself or others  Denies any auditory or visual hallucinations  Patient is superficial in conversation and guarded with care  Patient is currently not expressing any delusional thoughts, but remains paranoid and suspicious regarding food, staff members, and other peers on the unit  Will continue to titrate medications accordingly   Currently awaiting [facility] to [facility]    Mental Status Evaluation:  Appearance:  Marginal/poor hygiene   Behavior:  cooperative, guarded and Superficial   Fund of knowledge  aware of current events and Aware of past history   Speech:   Language: Normal rate and Normal volume  No overt abnormality   Mood:  stable   Affect:   Associations: blunted  Intact   Thought Process:  Circumstantial   Thought Content:  Paranoid and mistrustful, Obsessive ruminations and is somatically preoccupied   Perceptual Disturbances: Denies hallucinations and does not appear to be responding to internal stimuli   Risk Potential: No suicidal or homicidal ideation   Orientation  Oriented x 3   Memory No deficits   Attention/Concentration attention span appeared shorter than expected for age   Insight:  No insight   Judgment: Poor judgment   Gait/Station: normal gait/station and normal balance   Motor Activity: No abnormal movement noted     Progress Toward Goals: slightly improving    Assessment/Plan    Principal Problem:    Schizoaffective disorder, bipolar type (HCC)  Active Problems:    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[address] )    Acquired hypothyroidism    Gastroesophageal reflux disease without esophagitis    Noncompliant with deep vein thrombosis (DVT) prophylaxis    Allergic conjunctivitis of right eye      Recommended Treatment: Continue with pharmacotherapy, group therapy, milieu therapy and occupational therapy    The patient will be maintained on the following medications:    Current Facility-Administered Medications:  acetaminophen 650 mg Oral Q6H PRN Jp Reyna MD   albuterol 2 puff Inhalation Q4H PRN Mehul Winter, CRNP   aluminum-magnesium hydroxide-simethicone 15 mL Oral Q4H PRN Divya Chatman MD   ammonium lactate  Topical BID PRN Mehul Winter, ABILIO   benzonatate 100 mg Oral TID PRN Jp Reyna MD   benztropine 1 mg Intramuscular Q8H PRN Papito Meehan MD   benztropine 1 mg Oral Q8H PRN Divya Chatman MD   fluticasone-vilanterol 1 puff Inhalation Daily Alma Goss MD   haloperidol 1 mg Oral Q6H PRN Papito Meehan MD   haloperidol lactate 2 mg Intramuscular Q6H PRN Papito Meehan MD   hydrOXYzine HCL 25 mg Oral Q6H PRN Divya Chatman MD   ketotifen 1 drop Right Eye BID PRN Vini Shipley MD   levothyroxine 125 mcg Oral Early Morning Jp Reyna MD   magnesium hydroxide 30 mL Oral Daily PRN Inez Noble MD   paliperidone 12 mg Oral Daily Mehul Barges, CRNP   Or       OLANZapine 10 mg Intramuscular Daily Mehul Barges, CRNP   QUEtiapine 150 mg Oral HS Mehul Barges, CRNP   Or       OLANZapine 10 mg Intramuscular HS Mehul Barges, CRNP   pantoprazole 40 mg Oral Early Morning Jp Reyna MD   polyethylene glycol 17 g Oral Daily PRN Mehul Barges, CRNP   polyvinyl alcohol 1 drop Both Eyes Q3H PRN Merry Rivera PA-C   risperiDONE 1 mg Oral Q8H PRN Divya Chatman MD   theophylline 200 mg Oral Daily Sheryl Lake MD Prabhakar   traZODone 25 mg Oral HS PRN Sushma Sweeney MD       Risks, benefits and possible side effects of Medications:   Risks, benefits, and possible side effects of medications explained to patient and patient verbalizes understanding

## 2019-07-09 NOTE — PROGRESS NOTES
Currently observed in bed , O2 on at 2L via nasal cannula  Appears to be resting well  Monitored on safety checks

## 2019-07-09 NOTE — PLAN OF CARE
Problem: Risk for Self Injury/Neglect  Goal: Refrain from harming self  Description  Interventions:  - Monitor patient closely, per order  - Develop a trusting relationship  - Supervise medication ingestion, monitor effects and side effects   Outcome: Progressing     Problem: Depression  Goal: Refrain from self-neglect  Outcome: Progressing     Problem: Anxiety  Goal: Anxiety is at manageable level  Description  Interventions:  - Assess and monitor patient's anxiety level  - Monitor for signs and symptoms of anxiety both physical and emotional (heart palpitations, chest pain, shortness of breath, headaches, nausea, feeling jumpy, restlessness, irritable, apprehensive)  - Collaborate with interdisciplinary team and initiate plan and interventions as ordered    - [place] patient to unit/surroundings  - Explain treatment plan  - Encourage participation in care  - Encourage verbalization of concerns/fears  - Identify coping mechanisms  - Assist in developing anxiety-reducing skills  - Administer/offer alternative therapies  - Limit or eliminate stimulants  Outcome: Progressing

## 2019-07-10 PROCEDURE — 99232 SBSQ HOSP IP/OBS MODERATE 35: CPT | Performed by: NURSE PRACTITIONER

## 2019-07-10 RX ORDER — FLUOXETINE 10 MG/1
10 CAPSULE ORAL DAILY
Status: DISCONTINUED | OUTPATIENT
Start: 2019-07-11 | End: 2019-07-23 | Stop reason: HOSPADM

## 2019-07-10 RX ADMIN — FLUTICASONE FUROATE AND VILANTEROL TRIFENATATE 1 PUFF: 200; 25 POWDER RESPIRATORY (INHALATION) at 08:31

## 2019-07-10 RX ADMIN — PALIPERIDONE 12 MG: 6 TABLET, EXTENDED RELEASE ORAL at 08:32

## 2019-07-10 RX ADMIN — KETOTIFEN FUMARATE 1 DROP: 0.35 SOLUTION/ DROPS OPHTHALMIC at 13:10

## 2019-07-10 RX ADMIN — LEVOTHYROXINE SODIUM 125 MCG: 125 TABLET ORAL at 05:27

## 2019-07-10 RX ADMIN — PANTOPRAZOLE SODIUM 40 MG: 40 TABLET, DELAYED RELEASE ORAL at 05:27

## 2019-07-10 RX ADMIN — QUETIAPINE FUMARATE 150 MG: 150 TABLET, EXTENDED RELEASE ORAL at 21:01

## 2019-07-10 RX ADMIN — THEOPHYLLINE ANHYDROUS 200 MG: 200 CAPSULE, EXTENDED RELEASE ORAL at 08:32

## 2019-07-10 NOTE — PLAN OF CARE
Problem: Alteration in Thoughts and Perception  Goal: Treatment Goal: Gain control of psychotic behaviors/thinking, reduce/eliminate presenting symptoms and demonstrate improved reality functioning upon discharge  Outcome: Progressing  Goal: Verbalize thoughts and feelings  Description  Interventions:  - Promote a nonjudgmental and trusting relationship with the patient through active listening and therapeutic communication  - Assess patient's level of functioning, behavior and potential for risk  - Engage patient in 1 on 1 interactions for a minimum of 15 minutes each session  - Encourage patient to express fears, feelings, frustrations, and discuss symptoms    - [place] patient to reality, help patient recognize reality-based thinking   - Administer medications as ordered and assess for potential side effects  - Provide the patient education related to the signs and symptoms of the illness and desired effects of prescribed medications  Outcome: Progressing  Goal: Refrain from acting on delusional thinking/internal stimuli  Description  Interventions:  - Monitor patient closely, per order   - Utilize least restrictive measures   - Set reasonable limits, give positive feedback for acceptable   - Administer medications as ordered and monitor of potential side effects  Outcome: Progressing  Goal: Agree to be compliant with medication regime, as prescribed and report medication side effects  Description  Interventions:  - Offer appropriate PRN medication and supervise ingestion; conduct aims, as needed   Outcome: Progressing  Goal: Attend and participate in unit activities, including therapeutic, recreational, and educational groups  Description  Interventions:  - Provide therapeutic and educational activities daily, encourage attendance and participation, and document same in the medical record   Outcome: Progressing  Goal: Recognize dysfunctional thoughts, communicate reality-based thoughts at the time of discharge  Description  Interventions:  - Provide medication and psycho-education to assist patient in compliance and developing insight into his/her illness   Outcome: Progressing  Goal: Complete daily ADLs, including personal hygiene independently, as able  Description  Interventions:  - Observe, teach, and assist patient with ADLS  - Monitor and promote a balance of rest/activity, with adequate nutrition and elimination   Outcome: Progressing     Problem: Ineffective Coping  Goal: Identifies ineffective coping skills  Outcome: Progressing  Goal: Identifies healthy coping skills  Outcome: Progressing  Goal: Demonstrates healthy coping skills  Outcome: Progressing  Goal: Participates in unit activities  Description  Interventions:  - Provide therapeutic environment   - Provide required programming   - Redirect inappropriate behaviors   Outcome: Progressing  Goal: Patient/Family participate in treatment and DC plans  Description  Interventions:  - Provide therapeutic environment  Outcome: Progressing  Goal: Patient/Family verbalizes awareness of resources  Outcome: Progressing  Goal: Understands least restrictive measures  Description  Interventions:  - Utilize least restrictive behavior  Outcome: Progressing  Goal: Free from restraint events  Description  - Utilize least restrictive measures   - Provide behavioral interventions   - Redirect inappropriate behaviors   Outcome: Progressing     Problem: Risk for Self Injury/Neglect  Goal: Treatment Goal: Remain safe during length of stay, learn and adopt new coping skills, and be free of self-injurious ideation, impulses and acts at the time of discharge  Outcome: Progressing  Goal: Verbalize thoughts and feelings  Description  Interventions:  - Assess and re-assess patient's lethality and potential for self-injury  - Engage patient in 1:1 interactions, daily, for a minimum of 15 minutes  - Encourage patient to express feelings, fears, frustrations, hopes  - Establish rapport/trust with patient   Outcome: Progressing  Goal: Refrain from harming self  Description  Interventions:  - Monitor patient closely, per order  - Develop a trusting relationship  - Supervise medication ingestion, monitor effects and side effects   Outcome: Progressing  Goal: Attend and participate in unit activities, including therapeutic, recreational, and educational groups  Description  Interventions:  - Provide therapeutic and educational activities daily, encourage attendance and participation, and document same in the medical record  - Obtain collateral information, encourage visitation and family involvement in care   Outcome: Progressing  Goal: Recognize maladaptive responses and adopt new coping mechanisms  Outcome: Progressing  Goal: Complete daily ADLs, including personal hygiene independently, as able  Description  Interventions:  - Observe, teach, and assist patient with ADLS  - Monitor and promote a balance of rest/activity, with adequate nutrition and elimination  Outcome: Progressing     Problem: Depression  Goal: Treatment Goal: Demonstrate behavioral control of depressive symptoms, verbalize feelings of improved mood/affect, and adopt new coping skills prior to discharge  Outcome: Progressing  Goal: Verbalize thoughts and feelings  Description  Interventions:  - Assess and re-assess patient's level of risk   - Engage patient in 1:1 interactions, daily, for a minimum of 15 minutes   - Encourage patient to express feelings, fears, frustrations, hopes   Outcome: Progressing  Goal: Refrain from harming self  Description  Interventions:  - Monitor patient closely, per order   - Supervise medication ingestion, monitor effects and side effects   Outcome: Progressing  Goal: Refrain from isolation  Description  Interventions:  - Develop a trusting relationship   - Encourage socialization   Outcome: Progressing  Goal: Refrain from self-neglect  Outcome: Progressing  Goal: Attend and participate in unit activities, including therapeutic, recreational, and educational groups  Description  Interventions:  - Provide therapeutic and educational activities daily, encourage attendance and participation, and document same in the medical record   Outcome: Progressing  Goal: Complete daily ADLs, including personal hygiene independently, as able  Description  Interventions:  - Observe, teach, and assist patient with ADLS  -  Monitor and promote a balance of rest/activity, with adequate nutrition and elimination   Outcome: Progressing     Problem: Anxiety  Goal: Anxiety is at manageable level  Description  Interventions:  - Assess and monitor patient's anxiety level  - Monitor for signs and symptoms of anxiety both physical and emotional (heart palpitations, chest pain, shortness of breath, headaches, nausea, feeling jumpy, restlessness, irritable, apprehensive)  - Collaborate with interdisciplinary team and initiate plan and interventions as ordered    - [place] patient to unit/surroundings  - Explain treatment plan  - Encourage participation in care  - Encourage verbalization of concerns/fears  - Identify coping mechanisms  - Assist in developing anxiety-reducing skills  - Administer/offer alternative therapies  - Limit or eliminate stimulants  Outcome: Progressing     Problem: Prexisting or High Potential for Compromised Skin Integrity  Goal: Skin integrity is maintained or improved  Description  INTERVENTIONS:  - Identify patients at risk for skin breakdown  - Assess and monitor skin integrity  - Assess and monitor nutrition and hydration status  - Monitor labs (i e  albumin)  - Assess for incontinence   - Turn and reposition patient  - Assist with mobility/ambulation  - Relieve pressure over bony prominences  - Avoid friction and shearing  - Provide appropriate hygiene as needed including keeping skin clean and dry  - Evaluate need for skin moisturizer/barrier cream  - Collaborate with interdisciplinary team (i e  Nutrition, Rehabilitation, etc )   - Patient/family teaching  Outcome: Progressing

## 2019-07-10 NOTE — CASE MANAGEMENT
Patient continues to be isolative, somatic, and fixated on her portable oxygen battery  Patient continues to be paranoid and delusional  Patient with little conversation today  Remains awaiting [facility] bed  CM will continue to follow and provide services as needed

## 2019-07-10 NOTE — PROGRESS NOTES
07/10/19 0800   Team Meeting   Meeting Type Daily Rounds   Team Members Present   Team Members Present Physician;Nurse;; Other (Discipline and Name)  (Pharmacist )   Physician Team Member Dr Lopez Jalloh Team Member Howard Enciso, [address] Management Team Member Shira Boogie, [address] [place] [address] Dr Team Member Lamont Portillo    Other (Discipline and Name) Jp Jhaveri discussed at treatment rounds today, no medication changes made

## 2019-07-10 NOTE — PROGRESS NOTES
Pt continues to be isolative to room  Isolative to self at meals  OOB for meals and snacks  Sister came to visit currently in the dining room with pt  Denies AH/SI/HI  Cooperative with care  Continues on O2 2L  VSS

## 2019-07-10 NOTE — PROGRESS NOTES
Awake, alert, oriented  Cooperative on approach  Medication compliant  Isolative to self  Currently resting calmly in bed  Will continue to monitor

## 2019-07-10 NOTE — PROGRESS NOTES
Progress Note - Behavioral Health   Ashleigh Rutherford 60 y o  female MRN: [id number]  Unit/Bed#: [facility] 242-52 Encounter: [id number]    The patient was seen for continuing care and reviewed with treatment team  Staff reports that the patient remains self isolating, somatic, and preoccupied with treatment team and medications  Continues to refuse groups  During assessment the patient remains somatically preoccupied with medications and treatment  The patient is currently reporting ongoing depression due to chronic health issues and feelings of a lack of improvement in psychiatric symptoms  Denies any auditory or visual hallucinations  Denies any thoughts of self harm or harm to others  Continues to be superficial, tangential, delusional, and paranoid  Will start Prozac 10 mg po daily for symptom management       Mental Status Evaluation:  Appearance:  Marginal/poor hygiene   Behavior:  cooperative, guarded and Superficial   Fund of knowledge  aware of current events and Aware of past history   Speech:   Language: Normal rate and Normal volume  No overt abnormality   Mood:  depressed   Affect:   Associations: blunted  Intact   Thought Process:  Circumstantial   Thought Content:  Paranoid and mistrustful, Delusions of persecution, Obsessive ruminations and is somatically preoccupied   Perceptual Disturbances: Denies hallucinations and does not appear to be responding to internal stimuli   Risk Potential: No suicidal or homicidal ideation   Orientation  Oriented x 3   Memory No deficits   Attention/Concentration attention span appeared shorter than expected for age   Insight:  limited   Judgment: Limited   Gait/Station: normal gait/station and normal balance   Motor Activity: No abnormal movement noted     Progress Toward Goals: improving    Assessment/Plan    Principal Problem:    Schizoaffective disorder, bipolar type ([facility] )  Active Problems:    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[facility] )    Acquired hypothyroidism    Gastroesophageal reflux disease without esophagitis    Noncompliant with deep vein thrombosis (DVT) prophylaxis    Allergic conjunctivitis of right eye      Recommended Treatment: Continue with pharmacotherapy, group therapy, milieu therapy and occupational therapy  The patient will be maintained on the following medications:    Current Facility-Administered Medications:  acetaminophen 650 mg Oral Q6H PRN Lois Lyles MD   albuterol 2 puff Inhalation Q4H PRN ABILIO Madsen   aluminum-magnesium hydroxide-simethicone 15 mL Oral Q4H PRN Ross Rodriguez MD   ammonium lactate  Topical BID PRN ABILIO Madsen   benzonatate 100 mg Oral TID PRN Lois Lyles MD   benztropine 1 mg Intramuscular Q8H PRN Satnam Wilder MD   benztropine 1 mg Oral Q8H PRN Ross Rodriguez MD   fluticasone-vilanterol 1 puff Inhalation Daily Rosalinda Mathew MD   haloperidol 1 mg Oral Q6H PRN Satnam Wilder MD   haloperidol lactate 2 mg Intramuscular Q6H PRN Satnam Wilder MD   hydrOXYzine HCL 25 mg Oral Q6H PRN Ross Rodriguez MD   ketotifen 1 drop Right Eye BID PRN Melissa Mcmillan MD   levothyroxine 125 mcg Oral Early Morning Lois Lyles MD   magnesium hydroxide 30 mL Oral Daily PRN Laura Hoffman MD   paliperidone 12 mg Oral Daily [facility], JOSE CARLOSNP   Or       OLANZapine 10 mg Intramuscular Daily Seferino Rhodesacy, CRNP   QUEtiapine 150 mg Oral HS Seferino Tafoya, CRNP   Or       OLANZapine 10 mg Intramuscular HS Seferino Tafoya, CRNP   pantoprazole 40 mg Oral Early Morning Lois Lyles MD   polyethylene glycol 17 g Oral Daily PRN ABILIO Madsen   polyvinyl alcohol 1 drop Both Eyes Q3H PRN Merry Rivera PA-C   risperiDONE 1 mg Oral Q8H PRN Ross Rodriguez MD   theophylline 200 mg Oral Daily Lois Lyles MD   traZODone 25 mg Oral HS PRN Lois Lyles MD       Risks, benefits and possible side effects of Medications:   Risks, benefits, and possible side effects of medications explained to patient and patient verbalizes understanding

## 2019-07-10 NOTE — PROGRESS NOTES
Patient was isolative to her room  Complaint with medications and meals  Behavior appropriate  Continues on 2 liters of o2  Vitals stable  Patient currently in bed  Appears to be sleeping  No signs of distress or discomfort  Will continue to monitor on q 7 minute checks

## 2019-07-10 NOTE — PLAN OF CARE
Problem: Alteration in Thoughts and Perception  Goal: Verbalize thoughts and feelings  Description  Interventions:  - Promote a nonjudgmental and trusting relationship with the patient through active listening and therapeutic communication  - Assess patient's level of functioning, behavior and potential for risk  - Engage patient in 1 on 1 interactions for a minimum of 15 minutes each session  - Encourage patient to express fears, feelings, frustrations, and discuss symptoms    - [place] patient to reality, help patient recognize reality-based thinking   - Administer medications as ordered and assess for potential side effects  - Provide the patient education related to the signs and symptoms of the illness and desired effects of prescribed medications  Outcome: Progressing  Goal: Refrain from acting on delusional thinking/internal stimuli  Description  Interventions:  - Monitor patient closely, per order   - Utilize least restrictive measures   - Set reasonable limits, give positive feedback for acceptable   - Administer medications as ordered and monitor of potential side effects  Outcome: Progressing  Goal: Agree to be compliant with medication regime, as prescribed and report medication side effects  Description  Interventions:  - Offer appropriate PRN medication and supervise ingestion; conduct aims, as needed   Outcome: Progressing  Goal: Recognize dysfunctional thoughts, communicate reality-based thoughts at the time of discharge  Description  Interventions:  - Provide medication and psycho-education to assist patient in compliance and developing insight into his/her illness   Outcome: Progressing  Goal: Complete daily ADLs, including personal hygiene independently, as able  Description  Interventions:  - Observe, teach, and assist patient with ADLS  - Monitor and promote a balance of rest/activity, with adequate nutrition and elimination   Outcome: Progressing     Problem: Risk for Self Injury/Neglect  Goal: Treatment Goal: Remain safe during length of stay, learn and adopt new coping skills, and be free of self-injurious ideation, impulses and acts at the time of discharge  Outcome: Progressing  Goal: Verbalize thoughts and feelings  Description  Interventions:  - Assess and re-assess patient's lethality and potential for self-injury  - Engage patient in 1:1 interactions, daily, for a minimum of 15 minutes  - Encourage patient to express feelings, fears, frustrations, hopes  - Establish rapport/trust with patient   Outcome: Progressing  Goal: Refrain from harming self  Description  Interventions:  - Monitor patient closely, per order  - Develop a trusting relationship  - Supervise medication ingestion, monitor effects and side effects   Outcome: Progressing  Goal: Recognize maladaptive responses and adopt new coping mechanisms  Outcome: Progressing  Goal: Complete daily ADLs, including personal hygiene independently, as able  Description  Interventions:  - Observe, teach, and assist patient with ADLS  - Monitor and promote a balance of rest/activity, with adequate nutrition and elimination  Outcome: Progressing     Problem: Depression  Goal: Treatment Goal: Demonstrate behavioral control of depressive symptoms, verbalize feelings of improved mood/affect, and adopt new coping skills prior to discharge  Outcome: Progressing  Goal: Verbalize thoughts and feelings  Description  Interventions:  - Assess and re-assess patient's level of risk   - Engage patient in 1:1 interactions, daily, for a minimum of 15 minutes   - Encourage patient to express feelings, fears, frustrations, hopes   Outcome: Progressing  Goal: Refrain from harming self  Description  Interventions:  - Monitor patient closely, per order   - Supervise medication ingestion, monitor effects and side effects   Outcome: Progressing  Goal: Refrain from isolation  Description  Interventions:  - Develop a trusting relationship   - Encourage socialization   Outcome: Progressing  Goal: Refrain from self-neglect  Outcome: Progressing  Goal: Complete daily ADLs, including personal hygiene independently, as able  Description  Interventions:  - Observe, teach, and assist patient with ADLS  -  Monitor and promote a balance of rest/activity, with adequate nutrition and elimination   Outcome: Progressing     Problem: Anxiety  Goal: Anxiety is at manageable level  Description  Interventions:  - Assess and monitor patient's anxiety level  - Monitor for signs and symptoms of anxiety both physical and emotional (heart palpitations, chest pain, shortness of breath, headaches, nausea, feeling jumpy, restlessness, irritable, apprehensive)  - Collaborate with interdisciplinary team and initiate plan and interventions as ordered    - [place] patient to unit/surroundings  - Explain treatment plan  - Encourage participation in care  - Encourage verbalization of concerns/fears  - Identify coping mechanisms  - Assist in developing anxiety-reducing skills  - Administer/offer alternative therapies  - Limit or eliminate stimulants  Outcome: Progressing     Problem: Prexisting or High Potential for Compromised Skin Integrity  Goal: Skin integrity is maintained or improved  Description  INTERVENTIONS:  - Identify patients at risk for skin breakdown  - Assess and monitor skin integrity  - Assess and monitor nutrition and hydration status  - Monitor labs (i e  albumin)  - Assess for incontinence   - Turn and reposition patient  - Assist with mobility/ambulation  - Relieve pressure over bony prominences  - Avoid friction and shearing  - Provide appropriate hygiene as needed including keeping skin clean and dry  - Evaluate need for skin moisturizer/barrier cream  - Collaborate with interdisciplinary team (i e  Nutrition, Rehabilitation, etc )   - Patient/family teaching  Outcome: Progressing

## 2019-07-11 PROCEDURE — 99232 SBSQ HOSP IP/OBS MODERATE 35: CPT | Performed by: NURSE PRACTITIONER

## 2019-07-11 PROCEDURE — 94760 N-INVAS EAR/PLS OXIMETRY 1: CPT

## 2019-07-11 RX ADMIN — FLUOXETINE 10 MG: 10 CAPSULE ORAL at 08:22

## 2019-07-11 RX ADMIN — PANTOPRAZOLE SODIUM 40 MG: 40 TABLET, DELAYED RELEASE ORAL at 05:51

## 2019-07-11 RX ADMIN — PALIPERIDONE 12 MG: 6 TABLET, EXTENDED RELEASE ORAL at 08:22

## 2019-07-11 RX ADMIN — FLUTICASONE FUROATE AND VILANTEROL TRIFENATATE 1 PUFF: 200; 25 POWDER RESPIRATORY (INHALATION) at 08:21

## 2019-07-11 RX ADMIN — LEVOTHYROXINE SODIUM 125 MCG: 125 TABLET ORAL at 05:51

## 2019-07-11 RX ADMIN — QUETIAPINE FUMARATE 150 MG: 150 TABLET, EXTENDED RELEASE ORAL at 21:17

## 2019-07-11 RX ADMIN — THEOPHYLLINE ANHYDROUS 200 MG: 200 CAPSULE, EXTENDED RELEASE ORAL at 08:22

## 2019-07-11 NOTE — PROGRESS NOTES
Progress Note - Behavioral Health   Jarvis Tierney 60 y o  female MRN: [id number]  Unit/Bed#: [facility] 927-37 Encounter: [id number]    The patient was seen for continuing care and reviewed with treatment team  Staff reports that the patient remains self isolating, but continues to come out of bed for meals only  Refuses groups, but remains compliant with medications  During assessment the patient continues to remain focused on treatment plan and medication administration  Will order blood work for the am in order to attempt to initiate Clozaril due to multiple failed medication trials  Patient continues to remain paranoid and fixed in her delusions  Continues to report a "chip in her hand that tells her what to do " Denies any auditory or visual hallucinations, but remains somatically preoccupied  If CBC is within normal limits will initiate Clozaril dose tomorrow       Mental Status Evaluation:  Appearance:  Marginal/poor hygiene   Behavior:  cooperative, guarded, Superficial and Dismissive   Fund of knowledge  aware of current events and Aware of past history   Speech:   Language: Normal rate and Normal volume  No overt abnormality   Mood:  depressed   Affect:   Associations: constricted  Intact   Thought Process:  Circumstantial   Thought Content:  Paranoid and mistrustful, Delusions of persecution, Obsessive ruminations and Somatic delusions   Perceptual Disturbances: Denies hallucinations and does not appear to be responding to internal stimuli   Risk Potential: No suicidal or homicidal ideation   Orientation  Oriented x 3   Memory No deficits   Attention/Concentration attention span appeared shorter than expected for age   Insight:  limited   Judgment: Limited   Gait/Station: normal gait/station and normal balance   Motor Activity: No abnormal movement noted     Progress Toward Goals: slightly improved  Assessment/Plan    Principal Problem:    Schizoaffective disorder, bipolar type ([facility] )  Active Problems: 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[facility] )    Acquired hypothyroidism    Gastroesophageal reflux disease without esophagitis    Noncompliant with deep vein thrombosis (DVT) prophylaxis    Allergic conjunctivitis of right eye      Recommended Treatment: Continue with pharmacotherapy, group therapy, milieu therapy and occupational therapy    The patient will be maintained on the following medications:    Current Facility-Administered Medications:  acetaminophen 650 mg Oral Q6H PRN Anu Gomez MD   albuterol 2 puff Inhalation Q4H PRN Lu Lea, CRNP   aluminum-magnesium hydroxide-simethicone 15 mL Oral Q4H PRN Ginette Chavarria MD   ammonium lactate  Topical BID PRN Lu Lea, CRNP   benzonatate 100 mg Oral TID PRN Anu Gomez MD   benztropine 1 mg Intramuscular Q8H PRN Soren Ortega MD   benztropine 1 mg Oral Q8H PRN Ginette Chavarria MD   FLUoxetine 10 mg Oral Daily Lu Leonora, CRNP   fluticasone-vilanterol 1 puff Inhalation Daily Adrianna Bal MD   haloperidol 1 mg Oral Q6H PRN Soren Ortega MD   haloperidol lactate 2 mg Intramuscular Q6H PRN Soren Ortega MD   hydrOXYzine HCL 25 mg Oral Q6H PRN Ginette Chavarria MD   ketotifen 1 drop Right Eye BID PRN Katie Sarmiento MD   levothyroxine 125 mcg Oral Early Morning Anu Gomez MD   magnesium hydroxide 30 mL Oral Daily PRN Alyssa Mccabe MD   paliperidone 12 mg Oral Daily Lu Stack, CRNP   Or       OLANZapine 10 mg Intramuscular Daily Lu Stack, CRNP   QUEtiapine 150 mg Oral HS Lu Stack, CRNP   Or       OLANZapine 10 mg Intramuscular HS Lu Leonora, CRNP   pantoprazole 40 mg Oral Early Morning Anu Gomez MD   polyethylene glycol 17 g Oral Daily PRN Lumanny Lea, CRNP   polyvinyl alcohol 1 drop Both Eyes Q3H PRN Merry Rivera PA-C   risperiDONE 1 mg Oral Q8H PRN Ginette Chavarria MD   theophylline 200 mg Oral Daily Anu Gomez MD   traZODone 25 mg Oral HS PRN Anu Gomez MD       Risks, benefits and possible side effects of Medications:   Risks, benefits, and possible side effects of medications explained to patient and patient verbalizes understanding

## 2019-07-11 NOTE — PROGRESS NOTES
07/11/19 0804   Team Meeting   Meeting Type Daily Rounds   Team Members Present   Team Members Present Physician;Nurse;;Occupational Therapist   Physician Team Member Dr Kennedi Godwin Team Member Rodo GARCIA    Care Management Team Member Duong Perez    OT Team Member Jerel Loaiza Pt dicussed at treatment rounds today, no medication changes made   Continues to await EAC bed

## 2019-07-11 NOTE — PROGRESS NOTES
Progress Note - Behavioral Health     Murali Mchugh 60 y o  female MRN: [id number]  Unit/Bed#: [facility] 393-01 Encounter: [id number]    Murali Mchugh was seen for continuing care and reviewed with treatment team   Pt known to this writer and appears no different  Pt in bed, somatically preoccupied regarding her O2 today (but not her eyes today), pressured, and negative  She feels she cannot get up and out of bed due to limitations with breathing, though she is breathing fine at present  She reports that it "Bothers me to have to be on O2" and that she "Wouldn't have needed it if I did not have the surgery" (she means her past lung surgery )  Pt presently reports feeling depressed, "Tired of being here" and anxious  Clozapine was discussed by the team--to be started today as long as CBC permitted, due to Pt's failure to progress adequately  Pt at first refuses, fearing that she will be allergic to it (though has no h/o allergy to this )  I explained the rationale for adding this Tx  Pt presently denies SI, HI or hallucinations and is evasive about paranoia  Floor team relayed that Pt has been calm, medication compliant and cooperative  However, she has been having paranoia and recently mentioned that a chip in her hand was telling her what to do  She also remains seclusive to her room, getting OOB sparingly  Pt awaits EAC placement and team is working on having Pt get OOB more for medical and mental health  Pt is very resistant and unconfident of doing this (though in the past has stated to this writer that she would if she was not tethered to O2  This has been discussed previously that portable tanks are available)  Per the team, pulmonology feels that Pt may not need as much O2 as she is getting  Respiratory therapist saw Pt this morning and O2 sat above 90% on RA and conversing well  Pt was ordered a trial off of O2       Sleep:Good  Appetite: Good   Medication side effects: None per Pt    ROS: As per HPI    Labs/Tests: Reviewed    Mental Status Evaluation:  Appearance:  Dressed in hospital attire, good eye contact, unkempt in appearance but clean Pt appears to be speaking/breathing without labor and had NC with O2 running during time of this interview   Behavior:  Calm, pleasant, but frequently argumentative and often contradictory to any proposed plan or advice  Somewhat evasive   Speech:  Clear, normal rate and volume   Mood:  Anxious, Depressed   Affect:  Constricted   Thought Process:  Organized, Goal directed but negative, perseverative on O2, circumstantial   Associations: Intact associations   Thought Content:  Paranoid delusions   Perceptual Disturbances: Pt denies hallucinations and appears paranoid    She does not appear to be responding to internal stimuli   Risk Potential: Pt presently denies SI or HI    Sensorium:  Self, birthday, Place, Day of the week, Month, Year   Memory:  short term memory grossly intact   Consciousness:  alert, awake   Attention: Good   Insight:  Limited   Judgment: Limited   Gait/Station: Unobserved--Pt in bed   Motor Activity: No abnormal movements     Vitals:    07/11/19 1609 07/11/19 2053 07/12/19 0714 07/12/19 0902   BP: 103/68 110/58 97/60 (!) 84/51   BP Location:  Left arm Right arm Left arm   Pulse:  83 74 98   Resp:  18 20 20   Temp:  98 7 °F (37 1 °C) 97 8 °F (36 6 °C)    TempSrc:  Tympanic Oral    SpO2:  96% 96% 96%   Weight:       Height:           Admission on 05/05/2019   Component Date Value    POC Glucose 05/10/2019 156*    Clarity, UA 05/29/2019 Clear     Color, UA 05/29/2019 Straw     Specific [place], UA 05/29/2019 1 015     pH, UA 05/29/2019 8 0     Glucose, UA 05/29/2019 Negative     Ketones, UA 05/29/2019 Negative     Blood, UA 05/29/2019 Negative     Protein, UA 05/29/2019 Negative     Nitrite, UA 05/29/2019 Negative     Bilirubin, UA 05/29/2019 Negative     Leukocytes, UA 05/29/2019 Negative     WBC, UA 05/29/2019 0-1*    RBC, UA 05/29/2019 0-1*    Bacteria, UA 05/29/2019 Occasional     Epithelial Cells 05/29/2019 Occasional     UROBILINOGEN UA 05/29/2019 Negative     WBC 05/31/2019 7 10     RBC 05/31/2019 4 16     Hemoglobin 05/31/2019 12 8     Hematocrit 05/31/2019 39 0     MCV 05/31/2019 94     MCH 05/31/2019 30 8     MCHC 05/31/2019 32 8     RDW 05/31/2019 13 5     MPV 05/31/2019 9 9     Platelets 28/67/8177 239     Sodium 05/31/2019 138     Potassium 05/31/2019 3 8     Chloride 05/31/2019 102     CO2 05/31/2019 29     ANION GAP 05/31/2019 7     BUN 05/31/2019 11     Creatinine 05/31/2019 0 59*    Glucose 05/31/2019 84     Glucose, Fasting 05/31/2019 84     Calcium 05/31/2019 9 5     AST 05/31/2019 38*    ALT 05/31/2019 40     Alkaline Phosphatase 05/31/2019 87     Total Protein 05/31/2019 6 6     Albumin 05/31/2019 3 5     Total Bilirubin 05/31/2019 0 60     eGFR 05/31/2019 99     Segmented % 05/31/2019 27*    Bands % 05/31/2019 1     Lymphocytes % 05/31/2019 53*    Monocytes % 05/31/2019 12*    Eosinophils, % 05/31/2019 3     Basophils % 05/31/2019 1     Atypical Lymphocytes % 05/31/2019 3*    Neutrophils Absolute 05/31/2019 1 99     Lymphocytes Absolute 05/31/2019 3 76     Monocytes Absolute 05/31/2019 0 85     Eosinophils Absolute 05/31/2019 0 21     Basophils Absolute 05/31/2019 0 07     Total Counted 05/31/2019 100     RBC Morphology 05/31/2019 Normal     Platelet Estimate 79/54/8832 Adequate     Ventricular Rate 06/03/2019 89     Atrial Rate 06/03/2019 89     [place] Interval 06/03/2019 150     QRSD Interval 06/03/2019 90     QT Interval 06/03/2019 384     QTC Interval 06/03/2019 467     P Axis 06/03/2019 -17     QRS [place] 06/03/2019 125     T Wave Axis 06/03/2019 -1     Total Bilirubin 06/14/2019 0 30     Bilirubin, Direct 06/14/2019 0 10     Alkaline Phosphatase 06/14/2019 82     AST 06/14/2019 23     ALT 06/14/2019 36     Total Protein 06/14/2019 6 3     Albumin 06/14/2019 3 3     WBC 06/14/2019 6 50     RBC 06/14/2019 4 09     Hemoglobin 06/14/2019 12 3     Hematocrit 06/14/2019 38 2     MCV 06/14/2019 94     MCH 06/14/2019 30 0     MCHC 06/14/2019 32 1     RDW 06/14/2019 13 6     MPV 06/14/2019 9 6     Platelets 33/87/4299 210     Neutrophils Relative 06/14/2019 43*    Lymphocytes Relative 06/14/2019 42     Monocytes Relative 06/14/2019 11*    Eosinophils Relative 06/14/2019 3     Basophils Relative 06/14/2019 1     Neutrophils Absolute 06/14/2019 2 80     Lymphocytes Absolute 06/14/2019 2 80     Monocytes Absolute 06/14/2019 0 70     Eosinophils Absolute 06/14/2019 0 20     Basophils Absolute 06/14/2019 0 00     POC Glucose 06/16/2019 86     POC Glucose 06/18/2019 90     POC Glucose 06/25/2019 84     WBC 07/12/2019 7 30     RBC 07/12/2019 4 24     Hemoglobin 07/12/2019 12 7     Hematocrit 07/12/2019 38 6     MCV 07/12/2019 91     MCH 07/12/2019 30 1     MCHC 07/12/2019 33 0     RDW 07/12/2019 14 1     MPV 07/12/2019 9 3     Platelets 06/72/9057 191     Neutrophils Relative 07/12/2019 40*    Lymphocytes Relative 07/12/2019 47*    Monocytes Relative 07/12/2019 10     Eosinophils Relative 07/12/2019 2     Basophils Relative 07/12/2019 1     Neutrophils Absolute 07/12/2019 3 00     Lymphocytes Absolute 07/12/2019 3 40     Monocytes Absolute 07/12/2019 0 70     Eosinophils Absolute 07/12/2019 0 20     Basophils Absolute 07/12/2019 0 10        Progress Toward Goals:  Pt is not progressing  Reviewed intended plan with Dr Haven Pedraza  ANC 3 today, and Clozapine will be started at 25mg qhs  Weekly CBC with diff x 6 months  D/C Quetiapine and reduce Invega to 6mg qd to reduce antipsychotic medication burden and impetus for adverse events  Pt awaiting EAC placement      Assessment/Plan   Principal Problem:    Schizoaffective disorder, bipolar type ([facility] [address] )  Active Problems:    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[facility] [address] )    Acquired hypothyroidism    Gastroesophageal reflux disease without esophagitis    Noncompliant with deep vein thrombosis (DVT) prophylaxis    Allergic conjunctivitis of right eye      Recommended Treatment: Continue with pharmacotherapy, group therapy, milieu therapy and occupational therapy    The patient will be maintained on the following medications:    Current Facility-Administered Medications:  acetaminophen 650 mg Oral Q6H PRN Mulugeta Martínez MD   albuterol 2 puff Inhalation Q4H PRN ABILIO [place]   aluminum-magnesium hydroxide-simethicone 15 mL Oral Q4H PRN Óscar Hill MD   ammonium lactate  Topical BID PRN ABILIO [place]   benzonatate 100 mg Oral TID PRN Mulugeta Martínez MD   benztropine 1 mg Intramuscular Q8H PRN Genesis Guerrero MD   benztropine 1 mg Oral Q8H PRN Óscar Hill MD   [START ON 7/13/2019] cloZAPine 25 mg Oral HS Yenni Sánchez PA-C   FLUoxetine 10 mg Oral Daily ABILIO [place]   fluticasone-vilanterol 1 puff Inhalation Daily Essie Horn MD   haloperidol 1 mg Oral Q6H PRN Genesis Guerrero MD   haloperidol lactate 2 mg Intramuscular Q6H PRN Genesis Guerrero MD   hydrOXYzine HCL 25 mg Oral Q6H PRN Óscar Hill MD   ketotifen 1 drop Right Eye BID PRN Rogers Vázquez MD   levothyroxine 125 mcg Oral Early Morning Mulugeta Martínez MD   magnesium hydroxide 30 mL Oral Daily PRN Winter Nails MD   [START ON 7/13/2019] paliperidone 6 mg Oral Daily Yenni Sánchez PA-C   Or       [START ON 7/13/2019] OLANZapine 10 mg Intramuscular Daily Yenni Sánchez PA-C   QUEtiapine 150 mg Oral HS Yenni Sánchez PA-C   Or       OLANZapine 10 mg Intramuscular HS Yenni Sánchez PA-C   pantoprazole 40 mg Oral Early Morning Mulugeta Martínez MD   polyethylene glycol 17 g Oral Daily PRN ABILIO [place]   polyvinyl alcohol 1 drop Both Eyes Q3H PRN Merry Rivera PA-C   risperiDONE 1 mg Oral Q8H PRN Óscar Hill MD   theophylline 200 mg Oral Daily Mulugeta Martínez MD   traZODone 25 mg Oral HS PRN Mulugeta Martínez MD       Risks, benefits and possible side effects of Medications:   Risks, benefits, and possible side effects of medications explained to patient and patient verbalizes understanding

## 2019-07-11 NOTE — PLAN OF CARE
Problem: Alteration in Thoughts and Perception  Goal: Treatment Goal: Gain control of psychotic behaviors/thinking, reduce/eliminate presenting symptoms and demonstrate improved reality functioning upon discharge  Outcome: Progressing  Goal: Verbalize thoughts and feelings  Description  Interventions:  - Promote a nonjudgmental and trusting relationship with the patient through active listening and therapeutic communication  - Assess patient's level of functioning, behavior and potential for risk  - Engage patient in 1 on 1 interactions for a minimum of 15 minutes each session  - Encourage patient to express fears, feelings, frustrations, and discuss symptoms    - [place] patient to reality, help patient recognize reality-based thinking   - Administer medications as ordered and assess for potential side effects  - Provide the patient education related to the signs and symptoms of the illness and desired effects of prescribed medications  Outcome: Progressing  Goal: Refrain from acting on delusional thinking/internal stimuli  Description  Interventions:  - Monitor patient closely, per order   - Utilize least restrictive measures   - Set reasonable limits, give positive feedback for acceptable   - Administer medications as ordered and monitor of potential side effects  Outcome: Progressing  Goal: Agree to be compliant with medication regime, as prescribed and report medication side effects  Description  Interventions:  - Offer appropriate PRN medication and supervise ingestion; conduct aims, as needed   Outcome: Progressing  Goal: Recognize dysfunctional thoughts, communicate reality-based thoughts at the time of discharge  Description  Interventions:  - Provide medication and psycho-education to assist patient in compliance and developing insight into his/her illness   Outcome: Progressing  Goal: Complete daily ADLs, including personal hygiene independently, as able  Description  Interventions:  - Observe, teach, and assist patient with ADLS  - Monitor and promote a balance of rest/activity, with adequate nutrition and elimination   Outcome: Progressing     Problem: Ineffective Coping  Goal: Identifies ineffective coping skills  Outcome: Progressing  Goal: Identifies healthy coping skills  Outcome: Progressing  Goal: Demonstrates healthy coping skills  Outcome: Progressing  Goal: Participates in unit activities  Description  Interventions:  - Provide therapeutic environment   - Provide required programming   - Redirect inappropriate behaviors   Outcome: Progressing  Goal: Patient/Family participate in treatment and DC plans  Description  Interventions:  - Provide therapeutic environment  Outcome: Progressing  Goal: Patient/Family verbalizes awareness of resources  Outcome: Progressing  Goal: Understands least restrictive measures  Description  Interventions:  - Utilize least restrictive behavior  Outcome: Progressing  Goal: Free from restraint events  Description  - Utilize least restrictive measures   - Provide behavioral interventions   - Redirect inappropriate behaviors   Outcome: Progressing     Problem: Risk for Self Injury/Neglect  Goal: Treatment Goal: Remain safe during length of stay, learn and adopt new coping skills, and be free of self-injurious ideation, impulses and acts at the time of discharge  Outcome: Progressing  Goal: Verbalize thoughts and feelings  Description  Interventions:  - Assess and re-assess patient's lethality and potential for self-injury  - Engage patient in 1:1 interactions, daily, for a minimum of 15 minutes  - Encourage patient to express feelings, fears, frustrations, hopes  - Establish rapport/trust with patient   Outcome: Progressing  Goal: Refrain from harming self  Description  Interventions:  - Monitor patient closely, per order  - Develop a trusting relationship  - Supervise medication ingestion, monitor effects and side effects   Outcome: Progressing  Goal: Recognize maladaptive responses and adopt new coping mechanisms  Outcome: Progressing  Goal: Complete daily ADLs, including personal hygiene independently, as able  Description  Interventions:  - Observe, teach, and assist patient with ADLS  - Monitor and promote a balance of rest/activity, with adequate nutrition and elimination  Outcome: Progressing     Problem: Depression  Goal: Treatment Goal: Demonstrate behavioral control of depressive symptoms, verbalize feelings of improved mood/affect, and adopt new coping skills prior to discharge  Outcome: Progressing  Goal: Verbalize thoughts and feelings  Description  Interventions:  - Assess and re-assess patient's level of risk   - Engage patient in 1:1 interactions, daily, for a minimum of 15 minutes   - Encourage patient to express feelings, fears, frustrations, hopes   Outcome: Progressing  Goal: Refrain from harming self  Description  Interventions:  - Monitor patient closely, per order   - Supervise medication ingestion, monitor effects and side effects   Outcome: Progressing  Goal: Refrain from isolation  Description  Interventions:  - Develop a trusting relationship   - Encourage socialization   Outcome: Progressing  Goal: Refrain from self-neglect  Outcome: Progressing  Goal: Complete daily ADLs, including personal hygiene independently, as able  Description  Interventions:  - Observe, teach, and assist patient with ADLS  -  Monitor and promote a balance of rest/activity, with adequate nutrition and elimination   Outcome: Progressing     Problem: Anxiety  Goal: Anxiety is at manageable level  Description  Interventions:  - Assess and monitor patient's anxiety level  - Monitor for signs and symptoms of anxiety both physical and emotional (heart palpitations, chest pain, shortness of breath, headaches, nausea, feeling jumpy, restlessness, irritable, apprehensive)  - Collaborate with interdisciplinary team and initiate plan and interventions as ordered    - [place] patient to unit/surroundings  - Explain treatment plan  - Encourage participation in care  - Encourage verbalization of concerns/fears  - Identify coping mechanisms  - Assist in developing anxiety-reducing skills  - Administer/offer alternative therapies  - Limit or eliminate stimulants  Outcome: Progressing     Problem: Prexisting or High Potential for Compromised Skin Integrity  Goal: Skin integrity is maintained or improved  Description  INTERVENTIONS:  - Identify patients at risk for skin breakdown  - Assess and monitor skin integrity  - Assess and monitor nutrition and hydration status  - Monitor labs (i e  albumin)  - Assess for incontinence   - Turn and reposition patient  - Assist with mobility/ambulation  - Relieve pressure over bony prominences  - Avoid friction and shearing  - Provide appropriate hygiene as needed including keeping skin clean and dry  - Evaluate need for skin moisturizer/barrier cream  - Collaborate with interdisciplinary team (i e  Nutrition, Rehabilitation, etc )   - Patient/family teaching  Outcome: Progressing     Problem: Alteration in Thoughts and Perception  Goal: Attend and participate in unit activities, including therapeutic, recreational, and educational groups  Description  Interventions:  - Provide therapeutic and educational activities daily, encourage attendance and participation, and document same in the medical record   Outcome: Not Progressing     Problem: Risk for Self Injury/Neglect  Goal: Attend and participate in unit activities, including therapeutic, recreational, and educational groups  Description  Interventions:  - Provide therapeutic and educational activities daily, encourage attendance and participation, and document same in the medical record  - Obtain collateral information, encourage visitation and family involvement in care   Outcome: Not Progressing     Problem: Depression  Goal: Attend and participate in unit activities, including therapeutic, recreational, and educational groups  Description  Interventions:  - Provide therapeutic and educational activities daily, encourage attendance and participation, and document same in the medical record   Outcome: Not Progressing

## 2019-07-11 NOTE — PROGRESS NOTES
Awake, alert, oriented  Medication compliant  Preoccupied with oxygen this morning, pt spoke with respiratory therapist as requested  Out for meals only at this time  Will continue to monitor

## 2019-07-11 NOTE — PROGRESS NOTES
Pt  Retreats to her room at group times  07/11/19 1100 07/11/19 1400   Activity/Group Checklist   Group Other (Comment)  (vitality group) Life Skills   Attendance Did not attend Did not attend; Refused

## 2019-07-11 NOTE — PLAN OF CARE
Problem: Alteration in Thoughts and Perception  Goal: Treatment Goal: Gain control of psychotic behaviors/thinking, reduce/eliminate presenting symptoms and demonstrate improved reality functioning upon discharge  Outcome: Progressing  Goal: Verbalize thoughts and feelings  Description  Interventions:  - Promote a nonjudgmental and trusting relationship with the patient through active listening and therapeutic communication  - Assess patient's level of functioning, behavior and potential for risk  - Engage patient in 1 on 1 interactions for a minimum of 15 minutes each session  - Encourage patient to express fears, feelings, frustrations, and discuss symptoms    - [place] patient to reality, help patient recognize reality-based thinking   - Administer medications as ordered and assess for potential side effects  - Provide the patient education related to the signs and symptoms of the illness and desired effects of prescribed medications  Outcome: Progressing  Goal: Refrain from acting on delusional thinking/internal stimuli  Description  Interventions:  - Monitor patient closely, per order   - Utilize least restrictive measures   - Set reasonable limits, give positive feedback for acceptable   - Administer medications as ordered and monitor of potential side effects  Outcome: Progressing  Goal: Agree to be compliant with medication regime, as prescribed and report medication side effects  Description  Interventions:  - Offer appropriate PRN medication and supervise ingestion; conduct aims, as needed   Outcome: Progressing  Goal: Recognize dysfunctional thoughts, communicate reality-based thoughts at the time of discharge  Description  Interventions:  - Provide medication and psycho-education to assist patient in compliance and developing insight into his/her illness   Outcome: Progressing  Goal: Complete daily ADLs, including personal hygiene independently, as able  Description  Interventions:  - Observe, teach, and assist patient with ADLS  - Monitor and promote a balance of rest/activity, with adequate nutrition and elimination   Outcome: Progressing     Problem: Risk for Self Injury/Neglect  Goal: Treatment Goal: Remain safe during length of stay, learn and adopt new coping skills, and be free of self-injurious ideation, impulses and acts at the time of discharge  Outcome: Progressing  Goal: Verbalize thoughts and feelings  Description  Interventions:  - Assess and re-assess patient's lethality and potential for self-injury  - Engage patient in 1:1 interactions, daily, for a minimum of 15 minutes  - Encourage patient to express feelings, fears, frustrations, hopes  - Establish rapport/trust with patient   Outcome: Progressing  Goal: Refrain from harming self  Description  Interventions:  - Monitor patient closely, per order  - Develop a trusting relationship  - Supervise medication ingestion, monitor effects and side effects   Outcome: Progressing  Goal: Recognize maladaptive responses and adopt new coping mechanisms  Outcome: Progressing  Goal: Complete daily ADLs, including personal hygiene independently, as able  Description  Interventions:  - Observe, teach, and assist patient with ADLS  - Monitor and promote a balance of rest/activity, with adequate nutrition and elimination  Outcome: Progressing     Problem: Depression  Goal: Treatment Goal: Demonstrate behavioral control of depressive symptoms, verbalize feelings of improved mood/affect, and adopt new coping skills prior to discharge  Outcome: Progressing  Goal: Verbalize thoughts and feelings  Description  Interventions:  - Assess and re-assess patient's level of risk   - Engage patient in 1:1 interactions, daily, for a minimum of 15 minutes   - Encourage patient to express feelings, fears, frustrations, hopes   Outcome: Progressing  Goal: Refrain from harming self  Description  Interventions:  - Monitor patient closely, per order   - Supervise medication ingestion, monitor effects and side effects   Outcome: Progressing  Goal: Refrain from isolation  Description  Interventions:  - Develop a trusting relationship   - Encourage socialization   Outcome: Progressing  Goal: Refrain from self-neglect  Outcome: Progressing  Goal: Complete daily ADLs, including personal hygiene independently, as able  Description  Interventions:  - Observe, teach, and assist patient with ADLS  -  Monitor and promote a balance of rest/activity, with adequate nutrition and elimination   Outcome: Progressing     Problem: Anxiety  Goal: Anxiety is at manageable level  Description  Interventions:  - Assess and monitor patient's anxiety level  - Monitor for signs and symptoms of anxiety both physical and emotional (heart palpitations, chest pain, shortness of breath, headaches, nausea, feeling jumpy, restlessness, irritable, apprehensive)  - Collaborate with interdisciplinary team and initiate plan and interventions as ordered    - [place] patient to unit/surroundings  - Explain treatment plan  - Encourage participation in care  - Encourage verbalization of concerns/fears  - Identify coping mechanisms  - Assist in developing anxiety-reducing skills  - Administer/offer alternative therapies  - Limit or eliminate stimulants  Outcome: Progressing     Problem: Prexisting or High Potential for Compromised Skin Integrity  Goal: Skin integrity is maintained or improved  Description  INTERVENTIONS:  - Identify patients at risk for skin breakdown  - Assess and monitor skin integrity  - Assess and monitor nutrition and hydration status  - Monitor labs (i e  albumin)  - Assess for incontinence   - Turn and reposition patient  - Assist with mobility/ambulation  - Relieve pressure over bony prominences  - Avoid friction and shearing  - Provide appropriate hygiene as needed including keeping skin clean and dry  - Evaluate need for skin moisturizer/barrier cream  - Collaborate with interdisciplinary team (i e  Nutrition, Rehabilitation, etc )   - Patient/family teaching  Outcome: Progressing

## 2019-07-11 NOTE — CASE MANAGEMENT
Discharge barriers/symptoms: Patient has not improved much since on the unit  Patient refuses groups, does not believe she needs medications, and in disagreement with treatment plan  Patient continues to be self isolating, only coming out for meals  Patient has been compliant with meals and has only been compliant with psychiatric medication as she does have a force medication over objection order  Patient continues to be paranoid and present with fixed delusions  Patient has been tangential, superficial, and guarded  Patient has not been taking care her of personal hygiene- as she often refuses  Patient is often mistrustful and often ruminates regarding her portable oxygen  Patient continues to have limited and poor insight into his mental health  Patient is often somatic and continues to ring her call bell  Patient has been educated numerous times on the use for the call bell and when the patient needs to come out and request things  Patient continues to be believe that she can just go back to the [facility] despite multiple failed attempts by hospital staff to get her to agree  Patient also continues to believe she can avoid E[facility] by being admitted to [facility] assisted living  Discharge plan: Plan for the patient is to be admitted the extended acute care unit when a bed becomes available  IF the patient does not do well in [facility], a stat hospital referral was completed and submitted to [facility] at [facility] as well   will continue to follow and provide services as needed

## 2019-07-11 NOTE — CASE MANAGEMENT
305 paperwork submitted to Derik Robison at [facility] for a hearing on Tuesday, July 16th at 8:15am   will continue to follow and provide service as needed

## 2019-07-12 LAB
BASOPHILS # BLD AUTO: 0.1 THOUSANDS/ΜL (ref 0–0.1)
BASOPHILS NFR BLD AUTO: 1 % (ref 0–1)
EOSINOPHIL # BLD AUTO: 0.2 THOUSAND/ΜL (ref 0–0.4)
EOSINOPHIL NFR BLD AUTO: 2 % (ref 0–6)
ERYTHROCYTE [DISTWIDTH] IN BLOOD BY AUTOMATED COUNT: 14.1 %
HCT VFR BLD AUTO: 38.6 % (ref 36–46)
HGB BLD-MCNC: 12.7 G/DL (ref 12–16)
LYMPHOCYTES # BLD AUTO: 3.4 THOUSANDS/ΜL (ref 0.5–4)
LYMPHOCYTES NFR BLD AUTO: 47 % (ref 25–45)
MCH RBC QN AUTO: 30.1 PG (ref 26–34)
MCHC RBC AUTO-ENTMCNC: 33 G/DL (ref 31–36)
MCV RBC AUTO: 91 FL (ref 80–100)
MONOCYTES # BLD AUTO: 0.7 THOUSAND/ΜL (ref 0.2–0.9)
MONOCYTES NFR BLD AUTO: 10 % (ref 1–10)
NEUTROPHILS # BLD AUTO: 3 THOUSANDS/ΜL (ref 1.8–7.8)
NEUTS SEG NFR BLD AUTO: 40 % (ref 45–65)
PLATELET # BLD AUTO: 191 THOUSANDS/UL (ref 150–450)
PMV BLD AUTO: 9.3 FL (ref 8.9–12.7)
RBC # BLD AUTO: 4.24 MILLION/UL (ref 4–5.2)
WBC # BLD AUTO: 7.3 THOUSAND/UL (ref 4.5–11)

## 2019-07-12 PROCEDURE — 85025 COMPLETE CBC W/AUTO DIFF WBC: CPT | Performed by: NURSE PRACTITIONER

## 2019-07-12 PROCEDURE — 99232 SBSQ HOSP IP/OBS MODERATE 35: CPT | Performed by: PSYCHIATRY & NEUROLOGY

## 2019-07-12 RX ORDER — PALIPERIDONE 6 MG/1
6 TABLET, EXTENDED RELEASE ORAL DAILY
Status: DISCONTINUED | OUTPATIENT
Start: 2019-07-13 | End: 2019-07-15

## 2019-07-12 RX ORDER — OLANZAPINE 10 MG/1
10 INJECTION, POWDER, LYOPHILIZED, FOR SOLUTION INTRAMUSCULAR DAILY
Status: DISCONTINUED | OUTPATIENT
Start: 2019-07-13 | End: 2019-07-15

## 2019-07-12 RX ORDER — CLOZAPINE 25 MG/1
25 TABLET ORAL
Status: DISCONTINUED | OUTPATIENT
Start: 2019-07-12 | End: 2019-07-12

## 2019-07-12 RX ORDER — QUETIAPINE 150 MG/1
150 TABLET, FILM COATED, EXTENDED RELEASE ORAL
Status: COMPLETED | OUTPATIENT
Start: 2019-07-12 | End: 2019-07-12

## 2019-07-12 RX ORDER — OLANZAPINE 10 MG/1
10 INJECTION, POWDER, LYOPHILIZED, FOR SOLUTION INTRAMUSCULAR
Status: COMPLETED | OUTPATIENT
Start: 2019-07-12 | End: 2019-07-12

## 2019-07-12 RX ORDER — CLOZAPINE 25 MG/1
25 TABLET ORAL
Status: DISCONTINUED | OUTPATIENT
Start: 2019-07-13 | End: 2019-07-16

## 2019-07-12 RX ADMIN — QUETIAPINE FUMARATE 150 MG: 150 TABLET, EXTENDED RELEASE ORAL at 21:44

## 2019-07-12 RX ADMIN — PALIPERIDONE 12 MG: 6 TABLET, EXTENDED RELEASE ORAL at 08:27

## 2019-07-12 RX ADMIN — FLUTICASONE FUROATE AND VILANTEROL TRIFENATATE 1 PUFF: 200; 25 POWDER RESPIRATORY (INHALATION) at 08:28

## 2019-07-12 RX ADMIN — PANTOPRAZOLE SODIUM 40 MG: 40 TABLET, DELAYED RELEASE ORAL at 05:15

## 2019-07-12 RX ADMIN — FLUOXETINE 10 MG: 10 CAPSULE ORAL at 08:27

## 2019-07-12 RX ADMIN — THEOPHYLLINE ANHYDROUS 200 MG: 200 CAPSULE, EXTENDED RELEASE ORAL at 08:27

## 2019-07-12 RX ADMIN — LEVOTHYROXINE SODIUM 125 MCG: 125 TABLET ORAL at 05:15

## 2019-07-12 NOTE — PROGRESS NOTES
07/12/19 0900   Team Meeting   Meeting Type Daily Rounds   Team Members Present   Team Members Present Physician;Nurse;;Occupational Therapist   Physician Team Member Katya CAI    Nursing Team Member [id number] Management Team Member Ana Rosa Castro    OT Team Member Sabina Wood       Pt discussed at treatment rounds today, no medication changes made today   respiratory will be working with patient over the weekend to determine if continuous oxygen is necessary

## 2019-07-12 NOTE — PROGRESS NOTES
07/12/19 1100   Activity/Group Checklist   Group Other (Comment)  (vitality group on humor )   Attendance Did not attend  (refused to attend group )   Interactions Other (Comment)  (Pt  jovial at bedside when presented with jokes )   Affect/Mood Normal range

## 2019-07-12 NOTE — PROGRESS NOTES
Pt visible on the unit, OOR for meals and snacks  Continues on 2L O2 and preoccupied with O2 therapy  No somatic complain this shift  Denies s/s and compliant with medication regimen

## 2019-07-12 NOTE — RESPIRATORY THERAPY NOTE
Evaluated pt off o2 resting  Pt was talking without o2 sat 91% times 20 min  Will cont to monitor off o2 for oxygen needs when resting

## 2019-07-12 NOTE — PROGRESS NOTES
Patient was in bed sleeping when the shift started  Breathing pattern even and unlabored  On oxygen 2L continuous via NC  Q 7 minutes safety checks ongoing  Will continue to monitor

## 2019-07-12 NOTE — PLAN OF CARE
Problem: Risk for Self Injury/Neglect  Goal: Refrain from harming self  Description  Interventions:  - Monitor patient closely, per order  - Develop a trusting relationship  - Supervise medication ingestion, monitor effects and side effects   Outcome: Progressing  Goal: Complete daily ADLs, including personal hygiene independently, as able  Description  Interventions:  - Observe, teach, and assist patient with ADLS  - Monitor and promote a balance of rest/activity, with adequate nutrition and elimination  Outcome: Progressing     Problem: Depression  Goal: Refrain from harming self  Description  Interventions:  - Monitor patient closely, per order   - Supervise medication ingestion, monitor effects and side effects   Outcome: Progressing  Goal: Refrain from self-neglect  Outcome: Progressing     Problem: Anxiety  Goal: Anxiety is at manageable level  Description  Interventions:  - Assess and monitor patient's anxiety level  - Monitor for signs and symptoms of anxiety both physical and emotional (heart palpitations, chest pain, shortness of breath, headaches, nausea, feeling jumpy, restlessness, irritable, apprehensive)  - Collaborate with interdisciplinary team and initiate plan and interventions as ordered    - [place] patient to unit/surroundings  - Explain treatment plan  - Encourage participation in care  - Encourage verbalization of concerns/fears  - Identify coping mechanisms  - Assist in developing anxiety-reducing skills  - Administer/offer alternative therapies  - Limit or eliminate stimulants  Outcome: Progressing

## 2019-07-12 NOTE — PLAN OF CARE
Problem: Alteration in Thoughts and Perception  Goal: Treatment Goal: Gain control of psychotic behaviors/thinking, reduce/eliminate presenting symptoms and demonstrate improved reality functioning upon discharge  Outcome: Progressing  Goal: Verbalize thoughts and feelings  Description  Interventions:  - Promote a nonjudgmental and trusting relationship with the patient through active listening and therapeutic communication  - Assess patient's level of functioning, behavior and potential for risk  - Engage patient in 1 on 1 interactions for a minimum of 15 minutes each session  - Encourage patient to express fears, feelings, frustrations, and discuss symptoms    - [place] patient to reality, help patient recognize reality-based thinking   - Administer medications as ordered and assess for potential side effects  - Provide the patient education related to the signs and symptoms of the illness and desired effects of prescribed medications  Outcome: Progressing  Goal: Refrain from acting on delusional thinking/internal stimuli  Description  Interventions:  - Monitor patient closely, per order   - Utilize least restrictive measures   - Set reasonable limits, give positive feedback for acceptable   - Administer medications as ordered and monitor of potential side effects  Outcome: Progressing  Goal: Agree to be compliant with medication regime, as prescribed and report medication side effects  Description  Interventions:  - Offer appropriate PRN medication and supervise ingestion; conduct aims, as needed   Outcome: Progressing  Goal: Attend and participate in unit activities, including therapeutic, recreational, and educational groups  Description  Interventions:  - Provide therapeutic and educational activities daily, encourage attendance and participation, and document same in the medical record   Outcome: Progressing  Goal: Recognize dysfunctional thoughts, communicate reality-based thoughts at the time of discharge  Description  Interventions:  - Provide medication and psycho-education to assist patient in compliance and developing insight into his/her illness   Outcome: Progressing  Goal: Complete daily ADLs, including personal hygiene independently, as able  Description  Interventions:  - Observe, teach, and assist patient with ADLS  - Monitor and promote a balance of rest/activity, with adequate nutrition and elimination   Outcome: Progressing

## 2019-07-13 RX ADMIN — THEOPHYLLINE ANHYDROUS 200 MG: 200 CAPSULE, EXTENDED RELEASE ORAL at 09:05

## 2019-07-13 RX ADMIN — FLUOXETINE 10 MG: 10 CAPSULE ORAL at 09:05

## 2019-07-13 RX ADMIN — CLOZAPINE 25 MG: 25 TABLET ORAL at 21:10

## 2019-07-13 RX ADMIN — LEVOTHYROXINE SODIUM 125 MCG: 125 TABLET ORAL at 05:29

## 2019-07-13 RX ADMIN — PALIPERIDONE 6 MG: 6 TABLET, EXTENDED RELEASE ORAL at 09:05

## 2019-07-13 RX ADMIN — FLUTICASONE FUROATE AND VILANTEROL TRIFENATATE 1 PUFF: 200; 25 POWDER RESPIRATORY (INHALATION) at 08:06

## 2019-07-13 RX ADMIN — PANTOPRAZOLE SODIUM 40 MG: 40 TABLET, DELAYED RELEASE ORAL at 05:29

## 2019-07-13 NOTE — PROGRESS NOTES
Pt upset about having to sit in the day room today, making up excuses as to why this is not possible for her, I e  No one will be out there, the light is too bright, etc   Pt encouraged to try, however she continues to complain  Pt given her portable O2 to walk out to day room

## 2019-07-13 NOTE — PROGRESS NOTES
Psychiatry Progress Note    Subjective: Interval History     The patient is lying in bed this morning  She does not want to get out of bed  Staff states that she needs to come and sit out in the day room today and patient is upset by this  She has multiple complaints and is fixated on her oxygen  Patient states she will be going down to [facility]  Patient is medication compliant  She continues to be paranoid  She has been isolative to her room  She is eating her meals  Patient states she is sleeping at night  She denies suicidal or homicidal ideation  She denies hallucinations  She has poor insight into her mental health      Behavior over the last 24 hours:  unchanged  Sleep: normal  Appetite: normal  Medication side effects: No  ROS: no complaints    Current medications:    Current Facility-Administered Medications:     acetaminophen (TYLENOL) tablet 650 mg, 650 mg, Oral, Q6H PRN, Erik Schumacher MD    albuterol (PROVENTIL HFA,VENTOLIN HFA) inhaler 2 puff, 2 puff, Inhalation, Q4H PRN, ABILIO Rodríguez    aluminum-magnesium hydroxide-simethicone (MYLANTA) 200-200-20 mg/5 mL oral suspension 15 mL, 15 mL, Oral, Q4H PRN, Dia Ricks MD, 15 mL at 05/12/19 2221    ammonium lactate (LAC-HYDRIN) 12 % lotion, , Topical, BID PRN, ABILIO Rodríguez, 1 application at 03/17/31 1114    benzonatate (TESSALON PERLES) capsule 100 mg, 100 mg, Oral, TID PRN, Erik Schumacher MD, 100 mg at 06/27/19 0928    benztropine (COGENTIN) injection 1 mg, 1 mg, Intramuscular, Q8H PRN, Deb Jansen MD    benztropine (COGENTIN) tablet 1 mg, 1 mg, Oral, Q8H PRN, Dia Ricks MD    cloZAPine (CLOZARIL) tablet 25 mg, 25 mg, Oral, HS, Yenni Sánchez PA-C    FLUoxetine (PROzac) capsule 10 mg, 10 mg, Oral, Daily, JOSE CARLOS RodríguezNP, 10 mg at 07/12/19 0827    fluticasone-vilanterol (BREO ELLIPTA) 200-25 MCG/INH inhaler 1 puff, 1 puff, Inhalation, Daily, Itzel Kuhn MD, 1 puff at 07/12/19 0828    haloperidol (HALDOL) oral concentrated solution 1 mg, 1 mg, Oral, Q6H PRN, Deb Jansen MD    haloperidol lactate (HALDOL) injection 2 mg, 2 mg, Intramuscular, Q6H PRN, Deb Jansen MD    hydrOXYzine HCL (ATARAX) tablet 25 mg, 25 mg, Oral, Q6H PRN, Dia Ricks MD, 25 mg at 06/30/19 1417    ketotifen (ZADITOR) 0 025 % ophthalmic solution 1 drop, 1 drop, Right Eye, BID PRN, Perico Nava MD, 1 drop at 07/10/19 1310    levothyroxine tablet 125 mcg, 125 mcg, Oral, Early Morning, Erik Schumacher MD, 125 mcg at 07/13/19 0529    magnesium hydroxide (MILK OF MAGNESIA) 400 mg/5 mL oral suspension 30 mL, 30 mL, Oral, Daily PRN, Shawn Connors MD    paliperidone (INVEGA) 24 hr tablet 6 mg, 6 mg, Oral, Daily **OR** OLANZapine (ZyPREXA) IM injection 10 mg, 10 mg, Intramuscular, Daily, Yenni Sánchez PA-C    pantoprazole (PROTONIX) EC tablet 40 mg, 40 mg, Oral, Early Morning, Erik Schumacher MD, 40 mg at 07/13/19 0529    polyethylene glycol (MIRALAX) packet 17 g, 17 g, Oral, Daily PRN, ABILIO Rodríguez    polyvinyl alcohol (LIQUIFILM TEARS) 1 4 % ophthalmic solution 1 drop, 1 drop, Both Eyes, Q3H PRN, Merry Rivera PA-C    risperiDONE (RisperDAL M-TABS) dispersible tablet 1 mg, 1 mg, Oral, Q8H PRN, Dia Ricks MD    theophylline (JEF-24) 24 hr capsule 200 mg, 200 mg, Oral, Daily, Erik Schumacher MD, 200 mg at 07/12/19 0827    traZODone (DESYREL) tablet 25 mg, 25 mg, Oral, HS PRN, Erik Schumacher MD    Current Problem List:    Patient Active Problem List   Diagnosis    Sepsis ([facility] )    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[facility]    Tobacco use disorder, continuous    Bipolar disorder ([facility] )    Left hip pain    Lactic acidosis    Hypokalemia    Hypomagnesemia    Compression fracture of L4 lumbar vertebra    Thoracic compression fracture (HCC)    Ventral hernia    Parapneumonic effusion    Acute on chronic respiratory failure with hypoxia (HCC)    Chronic respiratory failure (HCC)    Hypophosphatemia    Elevated MCV    Compression deformity of vertebra    Schizoaffective disorder, bipolar type (HCC)    Acquired hypothyroidism    Gastroesophageal reflux disease without esophagitis    Abnormal CT of the chest    Excessive cerumen in left ear canal    Lipoma of right upper extremity    Polydipsia    Localized swelling of both lower legs    Noncompliant with deep vein thrombosis (DVT) prophylaxis    Allergic conjunctivitis of right eye       Problem list reviewed 07/13/19     Objective:     Vital Signs:  Vitals:    07/12/19 0902 07/12/19 1452 07/12/19 2015 07/13/19 0703   BP: (!) 84/51 105/56 102/65 115/71   BP Location: Left arm Left arm Left arm Left arm   Pulse: 98 83 93 80   Resp: 20 18 18 16   Temp:   97 7 °F (36 5 °C) 97 8 °F (36 6 °C)   TempSrc:   Oral Temporal   SpO2: 96% 97% 93% 95%   Weight:       Height:             Appearance:  age appropriate and casually dressed   Behavior:  uncooperative   Speech:  normal volume   Mood:  anxious and irritable   Affect:  constricted   Thought Process:  normal   Thought Content:  delusions  persecutory   Perceptual Disturbances: None   Risk Potential: none   Sensorium:  person, place, situation and time   Cognition:  intact   Consciousness:  alert and awake    Attention: attention span and concentration were age appropriate   Intellect: average   Insight:  limited   Judgment: limited      Motor Activity: no abnormal movements       I/O Past 24 hours:  I/O last 3 completed shifts: In: 600 [P O :600]  Out: -   I/O this shift:  In: 300 [P O :300]  Out: -         Labs:  Reviewed 07/13/19      Assessment / Plan:     [facility]ffective disorder, [facility] type ([facility] )    Recommended Treatment:      Medication changes:  1) continue current medication regimen  Non-pharmacological treatments  1) Continue with group therapy, milieu therapy and occupational therapy  Safety  1) Safety/communication plan established targeting dynamic risk factors above      2) Risks, benefits, and possible side effects of medications explained to patient and patient verbalizes understanding  Counseling / Coordination of Care    Total floor / unit time spent today 20 minutes  Greater than 50% of total time was spent with the patient and / or family counseling and / or coordination of care  A description of the counseling / coordination of care  Patient's Rights, confidentiality and exceptions to confidentiality, use of automated medical record, [facility] staff access to medical record, and consent to treatment reviewed      Derrek Carey PA-C

## 2019-07-13 NOTE — PROGRESS NOTES
Pt's pulse ox is consistently between 94-95% on room    Pt shown pulse ox reading on dynamap and states "Oh, I'm surprised!"

## 2019-07-13 NOTE — PROGRESS NOTES
Patient remains isolative to her room  Patient was fixated on "someone told me I have to sit ln the day room tomorrow from 9am-3pm  I cant do that  The light will hurt my eyes  " patient compliant with medications  Patient currently is in bed  Appears to be sleeping  2 liter of 02 on  No signs of distress noted  Will continue to monitor on q 7 minute checks

## 2019-07-13 NOTE — PROGRESS NOTES
Pt OOB in day room this morning  She is very upset with RN for "trying to kill her", states RN is "wicked" and "out of her mind"  Pt states she "can't breathe without oxygen"  Pt continues calling out for staff to help her and/or to call respiratory therapist because RN "doesn't know what she's doing"  Pt calls out her complaints continuously, stating that she is "light-headed", that RN is trying to "rupture her lungs", etc   Pt's pulse ox is being monitored frequently and it is consistently in the low to mid 90s  See flowsheet

## 2019-07-13 NOTE — PROGRESS NOTES
Pt lying in bed on room air  Staff have been spot-checking pt's pulse ox, which has been between 92-94% on room air  Will continue to monitor

## 2019-07-13 NOTE — PROGRESS NOTES
Pt ambulated to bathroom without O2 because she went without notifying staff  Pt returned to day room and yelled for RN to check her oxygen saturation  Pulse ox was 92% on room air  Pt expressed disappointment

## 2019-07-14 RX ADMIN — THEOPHYLLINE ANHYDROUS 200 MG: 200 CAPSULE, EXTENDED RELEASE ORAL at 08:31

## 2019-07-14 RX ADMIN — CLOZAPINE 25 MG: 25 TABLET ORAL at 21:35

## 2019-07-14 RX ADMIN — PANTOPRAZOLE SODIUM 40 MG: 40 TABLET, DELAYED RELEASE ORAL at 05:35

## 2019-07-14 RX ADMIN — FLUTICASONE FUROATE AND VILANTEROL TRIFENATATE 1 PUFF: 200; 25 POWDER RESPIRATORY (INHALATION) at 08:31

## 2019-07-14 RX ADMIN — PALIPERIDONE 6 MG: 6 TABLET, EXTENDED RELEASE ORAL at 08:31

## 2019-07-14 RX ADMIN — LEVOTHYROXINE SODIUM 125 MCG: 125 TABLET ORAL at 05:35

## 2019-07-14 RX ADMIN — FLUOXETINE 10 MG: 10 CAPSULE ORAL at 08:31

## 2019-07-14 NOTE — PROGRESS NOTES
Pt came out of her room this morning without her nasal cannula on  Pt ate breakfast and took her meds without complaint  Pt's O2 sat monitored and was 94% on room air  Pt in no distress  Pt states she slept well last night  Returned to her room despite encouragement from staff to stay out in day room

## 2019-07-14 NOTE — PROGRESS NOTES
Psychiatry Progress Note    Subjective: Interval History     The patient tries to isolate to her room  She continues to be fixated on her oxygen and monitoring of this  She was resistive to coming out into the day room yesterday and needed multiple staff members to talk to her  She became paranoid last night about taking her Clozaril  She stated to staff I am going to go into anaphylactic shock this must be ordered wrong   Today patient states to me that she knew this medication was going to be ordered  She states she feels okay  Patient continues to be somatic  She is suspicious at times  She continues to have poor insight  Encouraged patient to sit out in dining room area again today      Behavior over the last 24 hours:  unchanged  Sleep: normal  Appetite: normal  Medication side effects: No  ROS: no complaints    Current medications:    Current Facility-Administered Medications:     acetaminophen (TYLENOL) tablet 650 mg, 650 mg, Oral, Q6H PRN, Rodrick Nguyen MD    albuterol (PROVENTIL HFA,VENTOLIN HFA) inhaler 2 puff, 2 puff, Inhalation, Q4H PRN, ABILIO Garcia    aluminum-magnesium hydroxide-simethicone (MYLANTA) 200-200-20 mg/5 mL oral suspension 15 mL, 15 mL, Oral, Q4H PRN, Luli Arrington MD, 15 mL at 05/12/19 2221    ammonium lactate (LAC-HYDRIN) 12 % lotion, , Topical, BID PRN, ABILIO Garcia, 1 application at 79/67/67 1114    benzonatate (TESSALON PERLES) capsule 100 mg, 100 mg, Oral, TID PRN, Rodrick Nguyen MD, 100 mg at 06/27/19 0928    benztropine (COGENTIN) injection 1 mg, 1 mg, Intramuscular, Q8H PRN, Marcio Romero MD    benztropine (COGENTIN) tablet 1 mg, 1 mg, Oral, Q8H PRN, Luli Arrington MD    cloZAPine (CLOZARIL) tablet 25 mg, 25 mg, Oral, HS, Yenni Sánchez PA-C, 25 mg at 07/13/19 2110    FLUoxetine (PROzac) capsule 10 mg, 10 mg, Oral, Daily, ABILIO Garcia, 10 mg at 07/13/19 0905    fluticasone-vilanterol (BREO ELLIPTA) 200-25 MCG/INH inhaler 1 puff, 1 puff, Inhalation, Daily, María Borges MD, 1 puff at 07/13/19 0806    haloperidol (HALDOL) oral concentrated solution 1 mg, 1 mg, Oral, Q6H PRN, Quita Marti MD    haloperidol lactate (HALDOL) injection 2 mg, 2 mg, Intramuscular, Q6H PRN, Quita Marti MD    hydrOXYzine HCL (ATARAX) tablet 25 mg, 25 mg, Oral, Q6H PRN, Cristino Grady MD, 25 mg at 06/30/19 1417    ketotifen (ZADITOR) 0 025 % ophthalmic solution 1 drop, 1 drop, Right Eye, BID PRN, Jovan Richter MD, 1 drop at 07/10/19 1310    levothyroxine tablet 125 mcg, 125 mcg, Oral, Early Morning, Chris Rosenberg MD, 125 mcg at 07/14/19 0535    magnesium hydroxide (MILK OF MAGNESIA) 400 mg/5 mL oral suspension 30 mL, 30 mL, Oral, Daily PRN, Milton Tay MD    paliperidone (INVEGA) 24 hr tablet 6 mg, 6 mg, Oral, Daily, 6 mg at 07/13/19 0905 **OR** OLANZapine (ZyPREXA) IM injection 10 mg, 10 mg, Intramuscular, Daily, Yenni Sánchez PA-C    pantoprazole (PROTONIX) EC tablet 40 mg, 40 mg, Oral, Early Morning, Chris Rosenberg MD, 40 mg at 07/14/19 0535    polyethylene glycol (MIRALAX) packet 17 g, 17 g, Oral, Daily PRN, ABILIO Sheridan    polyvinyl alcohol (LIQUIFILM TEARS) 1 4 % ophthalmic solution 1 drop, 1 drop, Both Eyes, Q3H PRN, Merry Rivera PA-C    risperiDONE (RisperDAL M-TABS) dispersible tablet 1 mg, 1 mg, Oral, Q8H PRN, Cristino Grady MD    theophylline (JEF-24) 24 hr capsule 200 mg, 200 mg, Oral, Daily, Chris Rosenberg MD, 200 mg at 07/13/19 3612    traZODone (DESYREL) tablet 25 mg, 25 mg, Oral, HS PRN, Chris Rosenberg MD    Current Problem List:    Patient Active Problem List   Diagnosis    Sepsis ([facility]    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[facility]    Tobacco use disorder, continuous    Bipolar disorder ([facility]    Left hip pain    Lactic acidosis    Hypokalemia    Hypomagnesemia    Compression fracture of L4 lumbar vertebra    Thoracic compression fracture (HCC)    Ventral hernia    Parapneumonic effusion    Acute on chronic respiratory failure with hypoxia (HCC)    Chronic respiratory failure (HCC)    Hypophosphatemia    Elevated MCV    Compression deformity of vertebra    Schizoaffective disorder, bipolar type (HCC)    Acquired hypothyroidism    Gastroesophageal reflux disease without esophagitis    Abnormal CT of the chest    Excessive cerumen in left ear canal    Lipoma of right upper extremity    Polydipsia    Localized swelling of both lower legs    Noncompliant with deep vein thrombosis (DVT) prophylaxis    Allergic conjunctivitis of right eye       Problem list reviewed 07/14/19     Objective:     Vital Signs:  Vitals:    07/13/19 1700 07/13/19 1800 07/13/19 2110 07/14/19 0704   BP:   145/61 96/59   BP Location:   Right arm Left arm   Pulse:   104 83   Resp:   16 16   Temp:   97 8 °F (36 6 °C) 98 °F (36 7 °C)   TempSrc:   Temporal Temporal   SpO2: 93% 91% 93% 98%   Weight:       Height:             Appearance:  age appropriate and casually dressed   Behavior:  uncooperative   Speech:  normal volume   Mood:  anxious and irritable   Affect:  constricted   Thought Process:  normal   Thought Content:  delusions  persecutory   Perceptual Disturbances: None   Risk Potential: none   Sensorium:  person, place, situation and time   Cognition:  intact   Consciousness:  alert and awake    Attention: attention span and concentration were age appropriate   Intellect: average   Insight:  limited   Judgment: limited      Motor Activity: no abnormal movements       I/O Past 24 hours:  I/O last 3 completed shifts: In: 80 [P O :780]  Out: -   I/O this shift:  In: 360 [P O :360]  Out: -         Labs:  Reviewed 07/14/19      Assessment / Plan:     Schizoaffective disorder, bipolar type ([facility] )    Recommended Treatment:      Medication changes:  1) continue current medication regimen  Non-pharmacological treatments  1) Continue with group therapy, milieu therapy and occupational therapy      Safety  1) Safety/communication plan established targeting dynamic risk factors above  2) Risks, benefits, and possible side effects of medications explained to patient and patient verbalizes understanding  Counseling / Coordination of Care    Total floor / unit time spent today 20 minutes  Greater than 50% of total time was spent with the patient and / or family counseling and / or coordination of care  A description of the counseling / coordination of care  Patient's Rights, confidentiality and exceptions to confidentiality, use of automated medical record, [facility] staff access to medical record, and consent to treatment reviewed      Jon Moffett PA-C

## 2019-07-14 NOTE — PROGRESS NOTES
Pt's O2 sat has been spot-checked through out the shift  Pt's sats have not dropped below 91%, even with ambulation  Pt has been more cooperative regarding oxygen, however continues to refuse a shower, to change her clothes, or any personal hygiene

## 2019-07-14 NOTE — PROGRESS NOTES
Patient continued to be fixated on her oxygen and levels  Oxygen levels have been monitored off oxygen and she is stable  She constantly wants her level checked  Patient had oxygen off most of the day according to report  Oxygen was placed on the patient at 2100 with evening medications  Patient became paranoid about taking Clozaril that was ordered  Stating "I am going to go into anaphylactic shock and that it must be ordered wrong "  notes reviewed  Patient compliant with medication  Patient is currently in bed  Appears to be sleeping  Respirations are normal  No signs of distress  Will continue to monitor on q 7 minute checks

## 2019-07-15 PROCEDURE — 99232 SBSQ HOSP IP/OBS MODERATE 35: CPT | Performed by: PSYCHIATRY & NEUROLOGY

## 2019-07-15 PROCEDURE — 99232 SBSQ HOSP IP/OBS MODERATE 35: CPT | Performed by: FAMILY MEDICINE

## 2019-07-15 RX ORDER — PALIPERIDONE 3 MG/1
3 TABLET, EXTENDED RELEASE ORAL DAILY
Status: DISCONTINUED | OUTPATIENT
Start: 2019-07-16 | End: 2019-07-16

## 2019-07-15 RX ORDER — OLANZAPINE 10 MG/1
5 INJECTION, POWDER, LYOPHILIZED, FOR SOLUTION INTRAMUSCULAR DAILY
Status: DISCONTINUED | OUTPATIENT
Start: 2019-07-16 | End: 2019-07-16

## 2019-07-15 RX ADMIN — FLUTICASONE FUROATE AND VILANTEROL TRIFENATATE 1 PUFF: 200; 25 POWDER RESPIRATORY (INHALATION) at 08:16

## 2019-07-15 RX ADMIN — LEVOTHYROXINE SODIUM 125 MCG: 125 TABLET ORAL at 05:58

## 2019-07-15 RX ADMIN — PALIPERIDONE 6 MG: 6 TABLET, EXTENDED RELEASE ORAL at 08:15

## 2019-07-15 RX ADMIN — PANTOPRAZOLE SODIUM 40 MG: 40 TABLET, DELAYED RELEASE ORAL at 05:58

## 2019-07-15 RX ADMIN — THEOPHYLLINE ANHYDROUS 200 MG: 200 CAPSULE, EXTENDED RELEASE ORAL at 08:15

## 2019-07-15 RX ADMIN — FLUOXETINE 10 MG: 10 CAPSULE ORAL at 08:15

## 2019-07-15 RX ADMIN — CLOZAPINE 25 MG: 25 TABLET ORAL at 21:23

## 2019-07-15 NOTE — PROGRESS NOTES
Pt able to tolerate full session with minimal complaint  She required firm encouragement to attend the session and was able to state fear of going to group with supplemental oxygen but tolerated the session well and will a pleasant demeanor         07/15/19 1100   Activity/Group Checklist   Group Other (Comment)  ("[facility] "Group- breathing techniques/trivia)   Attendance Attended   Attendance Duration (min) 31-45   Interactions Interacted appropriately   Affect/Mood Normal range   Goals Achieved Able to engage in interactions

## 2019-07-15 NOTE — CASE MANAGEMENT
Patient continues to be fixated on O2  Writer and RN spoke with patient regarding sleep study and how she will not been in any harm  Patient resistive but states she will do it  Patient continues to staff split and state " (so and so) said I dont have to be without oxygen and I don't have to be off if I don't want to be " Writer reminded patient that this is not true and all staff are on the same plan and are aware of the plan  Writer reminded patient of importance for the test and why she is should comply  CM will continue to follow and provide service as needed

## 2019-07-15 NOTE — ASSESSMENT & PLAN NOTE
- Longstanding history of COPD with asthma   - Patient is currently being weaned off oxygen during the day by respiratory therapy and is able to maintain adequate oxygen saturations on room air   - Patient states that she uses 2L NC at night   - Continue Breo Ellipta and Theophylline daily with albuterol inhalers as needed for wheezing

## 2019-07-15 NOTE — PLAN OF CARE
Problem: Alteration in Thoughts and Perception  Goal: Treatment Goal: Gain control of psychotic behaviors/thinking, reduce/eliminate presenting symptoms and demonstrate improved reality functioning upon discharge  Outcome: Progressing  Goal: Verbalize thoughts and feelings  Description  Interventions:  - Promote a nonjudgmental and trusting relationship with the patient through active listening and therapeutic communication  - Assess patient's level of functioning, behavior and potential for risk  - Engage patient in 1 on 1 interactions for a minimum of 15 minutes each session  - Encourage patient to express fears, feelings, frustrations, and discuss symptoms    - [place] patient to reality, help patient recognize reality-based thinking   - Administer medications as ordered and assess for potential side effects  - Provide the patient education related to the signs and symptoms of the illness and desired effects of prescribed medications  Outcome: Progressing  Goal: Refrain from acting on delusional thinking/internal stimuli  Description  Interventions:  - Monitor patient closely, per order   - Utilize least restrictive measures   - Set reasonable limits, give positive feedback for acceptable   - Administer medications as ordered and monitor of potential side effects  Outcome: Progressing  Goal: Agree to be compliant with medication regime, as prescribed and report medication side effects  Description  Interventions:  - Offer appropriate PRN medication and supervise ingestion; conduct aims, as needed   Outcome: Progressing  Goal: Attend and participate in unit activities, including therapeutic, recreational, and educational groups  Description  Interventions:  - Provide therapeutic and educational activities daily, encourage attendance and participation, and document same in the medical record   Outcome: Progressing  Goal: Recognize dysfunctional thoughts, communicate reality-based thoughts at the time of discharge  Description  Interventions:  - Provide medication and psycho-education to assist patient in compliance and developing insight into his/her illness   Outcome: Progressing  Goal: Complete daily ADLs, including personal hygiene independently, as able  Description  Interventions:  - Observe, teach, and assist patient with ADLS  - Monitor and promote a balance of rest/activity, with adequate nutrition and elimination   Outcome: Progressing     Problem: Risk for Self Injury/Neglect  Goal: Treatment Goal: Remain safe during length of stay, learn and adopt new coping skills, and be free of self-injurious ideation, impulses and acts at the time of discharge  Outcome: Progressing  Goal: Verbalize thoughts and feelings  Description  Interventions:  - Assess and re-assess patient's lethality and potential for self-injury  - Engage patient in 1:1 interactions, daily, for a minimum of 15 minutes  - Encourage patient to express feelings, fears, frustrations, hopes  - Establish rapport/trust with patient   Outcome: Progressing  Goal: Refrain from harming self  Description  Interventions:  - Monitor patient closely, per order  - Develop a trusting relationship  - Supervise medication ingestion, monitor effects and side effects   Outcome: Progressing  Goal: Recognize maladaptive responses and adopt new coping mechanisms  Outcome: Progressing  Goal: Complete daily ADLs, including personal hygiene independently, as able  Description  Interventions:  - Observe, teach, and assist patient with ADLS  - Monitor and promote a balance of rest/activity, with adequate nutrition and elimination  Outcome: Progressing     Problem: Depression  Goal: Treatment Goal: Demonstrate behavioral control of depressive symptoms, verbalize feelings of improved mood/affect, and adopt new coping skills prior to discharge  Outcome: Progressing  Goal: Verbalize thoughts and feelings  Description  Interventions:  - Assess and re-assess patient's level of risk   - Engage patient in 1:1 interactions, daily, for a minimum of 15 minutes   - Encourage patient to express feelings, fears, frustrations, hopes   Outcome: Progressing  Goal: Refrain from harming self  Description  Interventions:  - Monitor patient closely, per order   - Supervise medication ingestion, monitor effects and side effects   Outcome: Progressing  Goal: Refrain from isolation  Description  Interventions:  - Develop a trusting relationship   - Encourage socialization   Outcome: Progressing  Goal: Refrain from self-neglect  Outcome: Progressing  Goal: Complete daily ADLs, including personal hygiene independently, as able  Description  Interventions:  - Observe, teach, and assist patient with ADLS  -  Monitor and promote a balance of rest/activity, with adequate nutrition and elimination   Outcome: Progressing     Problem: Anxiety  Goal: Anxiety is at manageable level  Description  Interventions:  - Assess and monitor patient's anxiety level  - Monitor for signs and symptoms of anxiety both physical and emotional (heart palpitations, chest pain, shortness of breath, headaches, nausea, feeling jumpy, restlessness, irritable, apprehensive)  - Collaborate with interdisciplinary team and initiate plan and interventions as ordered    - [place] patient to unit/surroundings  - Explain treatment plan  - Encourage participation in care  - Encourage verbalization of concerns/fears  - Identify coping mechanisms  - Assist in developing anxiety-reducing skills  - Administer/offer alternative therapies  - Limit or eliminate stimulants  Outcome: Progressing     Problem: Prexisting or High Potential for Compromised Skin Integrity  Goal: Skin integrity is maintained or improved  Description  INTERVENTIONS:  - Identify patients at risk for skin breakdown  - Assess and monitor skin integrity  - Assess and monitor nutrition and hydration status  - Monitor labs (i e  albumin)  - Assess for incontinence   - Turn and reposition patient  - Assist with mobility/ambulation  - Relieve pressure over bony prominences  - Avoid friction and shearing  - Provide appropriate hygiene as needed including keeping skin clean and dry  - Evaluate need for skin moisturizer/barrier cream  - Collaborate with interdisciplinary team (i e  Nutrition, Rehabilitation, etc )   - Patient/family teaching  Outcome: Progressing

## 2019-07-15 NOTE — PROGRESS NOTES
Patient is noted to be resting in bed comfortably with eyes closed  Patient remains free from pain and discomfort  No acute medical concern noted  Will continue to monitor patient

## 2019-07-15 NOTE — PROGRESS NOTES
Awake and pleasant on approach  Pt isolative to her room  after breakfast  Pt shower with encouragement and is up for group now with encouragement  Med compliant  Pulse ox 93% on RA  Luigi Benjamin No distress noted  Will continue to monitor closely

## 2019-07-15 NOTE — PROGRESS NOTES
07/15/19 0900   Team Meeting   Meeting Type Daily Rounds   Team Members Present   Team Members Present Physician;Nurse;;Occupational Therapist   Physician Team Member Dr Case Hernandez Team Member [facility] Management Team Member Terry Nyhan    OT Team Member Nataly Monique      Pt discussed at treatment rounds today, pt was trail off O2 at rest during the day over the weekend successfully  Will order overnight continuous O2 pulse monitoring to determine if O2 is needed overnight   Started Clozaril over the weekend with some somatic complaints

## 2019-07-15 NOTE — NURSING NOTE
Patient visiting with family at start of shift  Pleasant and cooperative with unit routine  Thinking clear, makes eye contact and smiles appropriately  Patient educated on Clozaril and verbalized some understanding  Patient HS medication compliant  No PRNs needed  O2 via NC at 2L/ MIN  Patient currently in bed without concern  Will monitor

## 2019-07-15 NOTE — PROGRESS NOTES
Pharmacy Clozapine Monitoring Progress Note     Jerrell Tilley is receiving 25 mg clozapine daily at bedtime  Assessment/Plan:    Current phase of treatment is initation/titration  **If clozapine therapy is interrupted for more than 48 hours, therapy MUST be restarted at the initial titration dose to avoid hypotension, bradycardia, and syncope  **    Patient is eligible to receive clozapine and the [facility] monitoring frequency is weekly per clozapine REMS  The most recent [address] [facility] was   Lab Results   Component Value Date    NEUTROABS 3 00 07/12/2019    and the next lab is due on 7/19/19  Last Clozapine level (if available):    No results found for: CLOZAPINE  No results found for: NCLOZIP    Pharmacist Recommendations: The patient  is missing laboratory values  Based on the table below, pharmacy recommends the following: Please schedule a weekly CBC w/diff no later than 7/19/19  Also, consider a prophylactic bowel regimen  Monitoring: Adverse Effect Suggested Monitoring Patient's Status    Agranulocytosis ANC baseline and then repeat weekly for first 6 months and every 2 weeks for the second 6 months  Maintenance after one year of therapy every month  The most recent [address] [facility] was   Lab Results   Component Value Date    NEUTROABS 3 00 07/12/2019         This ANC is considered normal range (ANC >/= 1500/mcL)  Continue current treatment and monitoring course      Respiratory Depression Please ensure patient is not on concomitant respiratory lowering medications such as benzodiazepines, sedative hypnotics (eg  zolpidem) This patient is not on respiratory depression lowering medications   Myocarditis Baseline and weekly EKG, CRP, BNP, and troponin  Weekly symptomatic complaints of fatigue, dyspnea, tachycardia, chest pain, palpitations, and fever for the first 4 weeks    Last EKG Results on 6/3 showed:  No Noted Abnormalities    Last QTC on 6/3 was   0   Lab Value Date/Time    QTCINT 467 06/03/2019 1203         Last BNP was No results found for: NTBNP    Last Troponin was  0   Lab Value Date/Time    TROPONINI <0 01 05/01/2019 1318    TROPONINI <0 04 05/10/2015 1948        Orthostatic hypotension/  bradycardia Blood pressure and vital signs      Monitor blood pressure and orthostatic hypotension at least twice daily upon initiation and continue to follow at least once daily afterwards  /62 (BP Location: Right arm)   Pulse 78   Temp 97 8 °F (36 6 °C) (Temporal)   Resp 16   Ht 5' 4" (1 626 m)   Wt 66 5 kg (146 lb 9 7 oz)   SpO2 93%   BMI 25 16 kg/m²             Constipation (bowel obstruction) Assess at baseline and weekly during first four months of therapy  Docusate 100mg BID and Miralax 17 grams daily recommended initially  Prophylactic bowel regimen is not ordered  Sialorrhea Assess at baseline and weekly during first four months of therapy  May be managed using sublingual anticholinergic preparations (atropine 1% eye drops)  Patient is not experiencing sialorrhea  This will continue to be monitored  Please note that per current REMS protocol the provider can submit a treatment rationale that justifies clozapine treatment even if patient parameters are outside of REMS recommendations  The Clozapine REMS is an FDA-mandated program implemented by the manufacturers of clozapine  ([employer]  com/CpmgClozapineUI/home  u)  Pharmacy will continue to follow patient with team, thank you    Electronically signed by: Antwon Alcantara, Pharmacist

## 2019-07-15 NOTE — NURSING NOTE
Patient is overly concerned regarding the new order for a respiratory study to determine the need for oxygen overnight  Patient educated on the study and how she is able to maintain her oxygen saturation during the day without supplemental oxygen but patient continues to request respiratory therapist come to see her so she can refuse testing for tonight  Patient advised that we would be speaking with Respiratory Therapist later in the evening and that no testing is going on at this moment  No other issues noted at this time  No SOB or use of accessory muscles noted  No s/s of respiratory distress noted  O2 saturation at 91% on room air currently with no need for respiratory therapist at this time

## 2019-07-16 PROCEDURE — 97530 THERAPEUTIC ACTIVITIES: CPT

## 2019-07-16 PROCEDURE — 99239 HOSP IP/OBS DSCHRG MGMT >30: CPT | Performed by: PSYCHIATRY & NEUROLOGY

## 2019-07-16 RX ORDER — CLOZAPINE 25 MG/1
50 TABLET ORAL
Status: DISCONTINUED | OUTPATIENT
Start: 2019-07-16 | End: 2019-07-19

## 2019-07-16 RX ORDER — HALOPERIDOL 5 MG/ML
5 INJECTION INTRAMUSCULAR DAILY PRN
Status: DISCONTINUED | OUTPATIENT
Start: 2019-07-16 | End: 2019-07-23 | Stop reason: HOSPADM

## 2019-07-16 RX ORDER — OLANZAPINE 10 MG/1
5 INJECTION, POWDER, LYOPHILIZED, FOR SOLUTION INTRAMUSCULAR EVERY 8 HOURS PRN
Status: DISCONTINUED | OUTPATIENT
Start: 2019-07-16 | End: 2019-07-23 | Stop reason: HOSPADM

## 2019-07-16 RX ADMIN — CLOZAPINE 50 MG: 25 TABLET ORAL at 21:06

## 2019-07-16 RX ADMIN — THEOPHYLLINE ANHYDROUS 200 MG: 200 CAPSULE, EXTENDED RELEASE ORAL at 08:35

## 2019-07-16 RX ADMIN — PANTOPRAZOLE SODIUM 40 MG: 40 TABLET, DELAYED RELEASE ORAL at 05:57

## 2019-07-16 RX ADMIN — FLUOXETINE 10 MG: 10 CAPSULE ORAL at 08:35

## 2019-07-16 RX ADMIN — PALIPERIDONE 3 MG: 3 TABLET, EXTENDED RELEASE ORAL at 08:35

## 2019-07-16 RX ADMIN — FLUTICASONE FUROATE AND VILANTEROL TRIFENATATE 1 PUFF: 200; 25 POWDER RESPIRATORY (INHALATION) at 08:36

## 2019-07-16 RX ADMIN — LEVOTHYROXINE SODIUM 125 MCG: 125 TABLET ORAL at 05:57

## 2019-07-16 NOTE — PROGRESS NOTES
Patient isolative to room  Refused group  Needy and attention seeking   Repetitive  questions for oxygen  Denies suicidal ideation /AH/VH  Patient  refused ortho BP  Will continue to monitor

## 2019-07-16 NOTE — OCCUPATIONAL THERAPY NOTE
Occupational Therapy Activity Treatment Note      Alison Mathew    7/16/2019    Patient Active Problem List   Diagnosis    Sepsis ([address] )    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[address] )    Tobacco use disorder, continuous    Bipolar disorder ([address] )    Left hip pain    Lactic acidosis    Hypokalemia    Hypomagnesemia    Compression fracture of L4 lumbar vertebra    Thoracic compression fracture (HCC)    Ventral hernia    Parapneumonic effusion    Acute on chronic respiratory failure with hypoxia (HCC)    Chronic respiratory failure (HCC)    Hypophosphatemia    Elevated MCV    Compression deformity of vertebra    Schizoaffective disorder, bipolar type ([address] )    Acquired hypothyroidism    Gastroesophageal reflux disease without esophagitis    Abnormal CT of the chest    Excessive cerumen in left ear canal    Lipoma of right upper extremity    Polydipsia    Localized swelling of both lower legs    Noncompliant with deep vein thrombosis (DVT) prophylaxis    Allergic conjunctivitis of right eye       Past Medical History:   Diagnosis Date    Acid reflux     Anxiety     Asthma     Bipolar 1 disorder (HCC)     Chronic pain disorder     Chronic respiratory failure (HCC)     Compression fracture of fourth lumbar vertebra ([address] )     COPD (chronic obstructive pulmonary disease) (HCC)     Depression     GERD (gastroesophageal reflux disease)     History of home oxygen therapy     Hypothyroidism     Lipoma of upper extremity     Psychiatric illness     Schizoaffective disorder ([address]     Substance abuse ([address] )     Nicotine    Thoracic compression fracture ([address] )     Ventral hernia        No past surgical history on file      07/16/19 1328   Assessment   Assessment [address] was resting in her bed when approached for OT activity involvement  She was pleasant, she stated that she did not want to do any specific activity at this time, but she was agreeable to conversation regarding how she is doing   She talked about her sleep study the night before as this related to her use of O2  She appeared positive at this time that she was not on O2, and that this did open doors for her for increased choices and overall freedom  She did express some concern regarding what she would do if she needed O2, but she did not perseverate on this  She did share that she did sleep OK but did not dream anything she could remember, attributing this to anxiety that she had earlier had regarding her sleep study  She appeared in good spirits, she did state that she did not want to come out for groups at this time or to meet with this writer in the dayroom, but she did remain positive, pleasant in her interactions  Her comments were relevant to conversation as we discussed healthy living habits and ways to continue to progress  Progress has been slow but consistent towards her goals  Continue to promote positive engagement and focus via structured program  Continue to commend Patsy's positive interactions and behaviors that contribute to her overall health and wellness  Plan   Treatment Interventions ADL retraining;Continued evaluation; Activityengagement  (coping skills, reality focus, life management)   OT Frequency 2-3x/wk   Raeann Patten, OT

## 2019-07-16 NOTE — DISCHARGE SUMMARY
[facility]iat[facility]  Psychiatrists  Progress Note    Patient Name: Breanna Cooper  MRN: [id number]  DOS: 07/16/19    Chief Complaint:  paranoid delusions    Interval History: Patient continues to require frequent behavioral redirection  Somatic preoccupations and delusions of catastrophic thinking present  Has poor insight  Denies AH, SI      Mental Status Exam [Per above +]  Appearance: limited grooming; fair hygiene; no abnormal involuntary movements noted  Behavior: superficially related  Speech: wnl  Mood: anxious  Affect: constricted, congruent  Thought process: tangential, illogical  Thought content: delusions of catastrophe, somatic delusions; paranoia; denies SI  Perceptual disturbances: denies AH/VH  Cognition: oriented to all domains     Insight: poor  Judgement: poor        Current Facility-Administered Medications:     acetaminophen (TYLENOL) tablet 650 mg, 650 mg, Oral, Q6H PRN, Ptarick Dailey MD    albuterol (PROVENTIL HFA,VENTOLIN HFA) inhaler 2 puff, 2 puff, Inhalation, Q4H PRN, ABILIO Mcdonald    aluminum-magnesium hydroxide-simethicone (MYLANTA) 200-200-20 mg/5 mL oral suspension 15 mL, 15 mL, Oral, Q4H PRN, Devan George MD, 15 mL at 05/12/19 2221    ammonium lactate (LAC-HYDRIN) 12 % lotion, , Topical, BID PRN, ABILIO Mcdonald, 1 application at 97/05/37 1114    benzonatate (TESSALON PERLES) capsule 100 mg, 100 mg, Oral, TID PRN, Patrick Dailey MD, 100 mg at 06/27/19 0928    benztropine (COGENTIN) injection 1 mg, 1 mg, Intramuscular, Q8H PRN, José Varghese MD    cloZAPine (CLOZARIL) tablet 50 mg, 50 mg, Oral, HS, José Varghese MD    FLUoxetine (PROzac) capsule 10 mg, 10 mg, Oral, Daily, ABILIO Mcdonald, 10 mg at 07/16/19 0835    fluticasone-vilanterol (BREO ELLIPTA) 200-25 MCG/INH inhaler 1 puff, 1 puff, Inhalation, Daily, Ginny Astudillo MD, 1 puff at 07/16/19 0836    haloperidol lactate (HALDOL) injection 5 mg, 5 mg, Intramuscular, Daily PRN, Rosemarie Sutherland MD    ketotifen (ZADITOR) 0 025 % ophthalmic solution 1 drop, 1 drop, Right Eye, BID PRN, Fortino Yadav MD, 1 drop at 07/10/19 1310    levothyroxine tablet 125 mcg, 125 mcg, Oral, Early Morning, Niels Le MD, 125 mcg at 07/16/19 0557    magnesium hydroxide (MILK OF MAGNESIA) 400 mg/5 mL oral suspension 30 mL, 30 mL, Oral, Daily PRN, Arlington Mortimer, MD    OLANZapine (ZyPREXA) IM injection 5 mg, 5 mg, Intramuscular, Q8H PRN, Rosemarie Sutherland MD    pantoprazole (PROTONIX) EC tablet 40 mg, 40 mg, Oral, Early Morning, Niels Le MD, 40 mg at 07/16/19 0557    polyethylene glycol (MIRALAX) packet 17 g, 17 g, Oral, Daily PRN, Myrle Dense, CRNP    polyvinyl alcohol (LIQUIFILM TEARS) 1 4 % ophthalmic solution 1 drop, 1 drop, Both Eyes, Q3H PRN, Merry Rivera PA-C    theophylline (JEF-24) 24 hr capsule 200 mg, 200 mg, Oral, Daily, Niels Le MD, 200 mg at 07/16/19 0835    traZODone (DESYREL) tablet 25 mg, 25 mg, Oral, HS PRN, Niels Le MD    Vitals:    07/16/19 1500   BP: 100/66   Pulse: 86   Resp: 18   Temp: 99 2 °F (37 3 °C)   SpO2: 91%       Lab/work up: no new labs    Assessment:     Axis I:  · Schizoaffective disorder; has been treatment resistant  Axis II:  · OCPD  · Cluster B traits  Axis III:  - COPD with asthma, stable compression fracture of L4   Axis IV:  · Limited social support, chronically homeless, financial problems, on disability, noncompliant with treatment    Progress: limited    Plan:   1  Disposition: Patient meets criteria for ongoing acute inpatient treatment due to being danger to self 2/2 psychosis and poor self care  Patient will be discharged when there is an absence of psychosis [id number]  2  Legal status: 304  3   Psychopharmacologic interventions:   - discontinue invega 3mg po daily with eventual taper   - continue clozaril 50mg po qhs - olanzapine 5mg IM if patient refuses clozaril   - continue prozac 10mg po daily   - Continue to monitor psychosis  4  Medical comorbidities: Hospitalist following PRN  5  Other therapies: Individual/group/milieu therapy as appropriate   6  Social issues: Case management following to arrange disposition - likely transfer to [facility]  7   Precautions: Routine q15min checks, vitals qshift    Howard Moreno MD  07/16/19

## 2019-07-16 NOTE — PROGRESS NOTES
[facility]  Psychiatrists  Progress Note    Patient Name: Merly Galaviz  MRN: [id number]  DOS: 07/15/19    Chief Complaint:  paranoid delusions    Interval History: Patient continues to have somatic delusions  Has been tolerating being off O2 for hours at a time without problems but accuses staff of trying to kill her  She has chronic sustained delusions about a chip or battery being implanted in her arm  She is illogical, disorganized, evasive  Denies adverse effects of clozaril but opposes titration for no particular reason  Mental Status Exam [Per above +]  Appearance: limited grooming; fair hygiene; no abnormal involuntary movements noted  Behavior: guarded, evasive  Speech: wnl  Mood: anxious  Affect: neutral, stable, constricted  Thought process: illogical, tangential  Thought content: paranoid and somatic delusions elicited; denies SI/HI  Perceptual disturbances: denies AH/VH  Cognition: oriented to all domains     Insight: poor  Judgement: poor      Current Facility-Administered Medications:     acetaminophen (TYLENOL) tablet 650 mg, 650 mg, Oral, Q6H PRN, Sandy Acosta MD    albuterol (PROVENTIL HFA,VENTOLIN HFA) inhaler 2 puff, 2 puff, Inhalation, Q4H PRN, ABILIO Beltran    aluminum-magnesium hydroxide-simethicone (MYLANTA) 200-200-20 mg/5 mL oral suspension 15 mL, 15 mL, Oral, Q4H PRN, Kaela Mares MD, 15 mL at 05/12/19 2221    ammonium lactate (LAC-HYDRIN) 12 % lotion, , Topical, BID PRN, ABILIO Beltran, 1 application at 94/24/98 1114    benzonatate (TESSALON PERLES) capsule 100 mg, 100 mg, Oral, TID PRN, Sandy Acosta MD, 100 mg at 06/27/19 0928    benztropine (COGENTIN) injection 1 mg, 1 mg, Intramuscular, Q8H PRN, Rashida Gerber MD    benztropine (COGENTIN) tablet 1 mg, 1 mg, Oral, Q8H PRN, Kaela Mares MD    cloZAPine (CLOZARIL) tablet 25 mg, 25 mg, Oral, HS, Yenni Sánchez PA-C, 25 mg at 07/14/19 2135    FLUoxetine (PROzac) capsule 10 mg, 10 mg, Oral, Daily, Deanna Comfort, CRNP, 10 mg at 07/15/19 0815    fluticasone-vilanterol (BREO ELLIPTA) 200-25 MCG/INH inhaler 1 puff, 1 puff, Inhalation, Daily, Carolin Whiteside MD, 1 puff at 07/15/19 0816    haloperidol (HALDOL) oral concentrated solution 1 mg, 1 mg, Oral, Q6H PRN, Neal Hobson MD    haloperidol lactate (HALDOL) injection 2 mg, 2 mg, Intramuscular, Q6H PRN, Neal Hobsno MD    hydrOXYzine HCL (ATARAX) tablet 25 mg, 25 mg, Oral, Q6H PRN, Tito Gibbs MD, 25 mg at 06/30/19 1417    ketotifen (ZADITOR) 0 025 % ophthalmic solution 1 drop, 1 drop, Right Eye, BID PRN, Dilan Oswald MD, 1 drop at 07/10/19 1310    levothyroxine tablet 125 mcg, 125 mcg, Oral, Early Morning, James Stewart MD, 125 mcg at 07/15/19 0558    magnesium hydroxide (MILK OF MAGNESIA) 400 mg/5 mL oral suspension 30 mL, 30 mL, Oral, Daily PRN, Nimo Hanna MD    paliperidone (INVEGA) 24 hr tablet 6 mg, 6 mg, Oral, Daily, 6 mg at 07/15/19 0815 **OR** OLANZapine (ZyPREXA) IM injection 10 mg, 10 mg, Intramuscular, Daily, Yenni Sánchez PA-C    pantoprazole (PROTONIX) EC tablet 40 mg, 40 mg, Oral, Early Morning, James Stewart MD, 40 mg at 07/15/19 0558    polyethylene glycol (MIRALAX) packet 17 g, 17 g, Oral, Daily PRN, Deanna Comfort, CRNP    polyvinyl alcohol (LIQUIFILM TEARS) 1 4 % ophthalmic solution 1 drop, 1 drop, Both Eyes, Q3H PRN, Merry Rivera PA-C    risperiDONE (RisperDAL M-TABS) dispersible tablet 1 mg, 1 mg, Oral, Q8H PRN, Tito Gibbs MD    theophylline (JEF-24) 24 hr capsule 200 mg, 200 mg, Oral, Daily, James Stewart MD, 200 mg at 07/15/19 0815    traZODone (DESYREL) tablet 25 mg, 25 mg, Oral, HS PRN, James Stewart MD    Vitals:    07/15/19 2059   BP: 105/72   Pulse: 102   Resp: 17   Temp: 99 1 °F (37 3 °C)   SpO2: 92%       Lab/work up: no new labs    Assessment:     Axis I:  · Schizoaffective disorder; has been treatment resistant  Axis II:  · OCPD  · Cluster B traits  Axis III:  - COPD with asthma, stable compression fracture of L4   Axis IV:  · Limited social support, chronically homeless, financial problems, on disability, noncompliant with treatment    Progress: limited    Plan:   1  Disposition: Patient meets criteria for ongoing acute inpatient treatment due to being danger to self 2/2 psychosis and poor self care  Patient will be discharged when there is an absence of psychosis [id number]  2  Legal status: 304  3  Psychopharmacologic interventions:   - decrease invega to 3mg po daily with eventual taper   - increase clozaril to 50mg po qhs tomorrow   - continue prozac 10mg po daily   - Continue to monitor psychosis  4  Medical comorbidities: Hospitalist following PRN  5  Other therapies: Individual/group/milieu therapy as appropriate   6  Social issues: Case management following to arrange disposition - likely transfer to [facility]  7   Precautions: Routine q15min checks, vitals qshift    Dayne Nolasco MD  07/15/19

## 2019-07-16 NOTE — NURSING NOTE
Patient has been isolative to room, awake and pleasant on approach  Somatic at times and preoccupied about O2 use at [facility], also requesting accu check this evening because she "did not eat dinner"  No reports of SOB or pain, needs frequent encouragement and reassurance  Labs, orders and vital signs have been reviewed  On routine checks, will continue to monitor

## 2019-07-16 NOTE — PROGRESS NOTES
Pt present on the unit, continues to be fixated on O2 use  Pt appears to be worried about the resp sleep studies  Pt somatic,  delusional and with disorganized thoughts  Observed asleep on frequent night checks   Offered no concerns

## 2019-07-16 NOTE — PROGRESS NOTES
07/16/19 0900   Team Meeting   Meeting Type Daily Rounds   Team Members Present   Team Members Present Physician;Nurse;   Physician Team Member Dr Mina Santana Team Member Guero Carrera    Care Management Team Member Mahogany Goins      Pt discussed at treatment rounds today, no medication changes made   Still awaiting bed on EAC

## 2019-07-16 NOTE — PLAN OF CARE
Problem: OCCUPATIONAL THERAPY ADULT  Goal: Performs self-care activities at highest level of function for planned discharge setting  See evaluation for individualized goals  Description  Treatment Interventions: ADL retraining, Continued evaluation, Activityengagement(coping skills, reality focus, life management)          See flowsheet documentation for full assessment, interventions and recommendations  Outcome: Progressing  Note:   Limitation: Decreased ADL status, Decreased high-level ADLs, Mood limitation(guarded, limited coping and life management skills)  Prognosis: Fair  Assessment: Christen Dodd was resting in her bed when approached for OT activity involvement  She was pleasant, she stated that she did not want to do any specific activity at this time, but she was agreeable to conversation regarding how she is doing  She talked about her sleep study the night before as this related to her use of O2  She appeared positive at this time that she was not on O2, and that this did open doors for her for increased choices and overall freedom  She did express some concern regarding what she would do if she needed O2, but she did not perseverate on this  She did share that she did sleep OK but did not dream anything she could remember, attributing this to anxiety that she had earlier had regarding her sleep study  She appeared in good spirits, she did state that she did not want to come out for groups at this time or to meet with this writer in the dayroom, but she did remain positive, pleasant in her interactions  Her comments were relevant to conversation as we discussed healthy living habits and ways to continue to progress  Progress has been slow but consistent towards her goals  Continue to promote positive engagement and focus via structured program  Continue to commend Patsy's positive interactions and behaviors that contribute to her overall health and wellness

## 2019-07-16 NOTE — CASE MANAGEMENT
305 held today with [facility]  Patient originally wanted to attend however when Tex Bynum went to get patient, she declined stating, "It's not going to change anything " Patient is now a 80 by [facility] as they agreed with Dr Bear Vincent request for further inpatient treatment  CM will continue to follow and provide services as needed

## 2019-07-17 LAB
ATRIAL RATE: 82 BPM
P AXIS: 78 DEGREES
PR INTERVAL: 154 MS
QRS AXIS: -63 DEGREES
QRSD INTERVAL: 86 MS
QT INTERVAL: 412 MS
QTC INTERVAL: 481 MS
T WAVE AXIS: 59 DEGREES
VENTRICULAR RATE: 82 BPM

## 2019-07-17 PROCEDURE — 93005 ELECTROCARDIOGRAM TRACING: CPT

## 2019-07-17 PROCEDURE — 99232 SBSQ HOSP IP/OBS MODERATE 35: CPT | Performed by: PSYCHIATRY & NEUROLOGY

## 2019-07-17 PROCEDURE — 93010 ELECTROCARDIOGRAM REPORT: CPT | Performed by: INTERNAL MEDICINE

## 2019-07-17 RX ADMIN — CLOZAPINE 50 MG: 25 TABLET ORAL at 21:41

## 2019-07-17 RX ADMIN — PANTOPRAZOLE SODIUM 40 MG: 40 TABLET, DELAYED RELEASE ORAL at 05:06

## 2019-07-17 RX ADMIN — LEVOTHYROXINE SODIUM 125 MCG: 125 TABLET ORAL at 05:06

## 2019-07-17 RX ADMIN — THEOPHYLLINE ANHYDROUS 200 MG: 200 CAPSULE, EXTENDED RELEASE ORAL at 08:18

## 2019-07-17 RX ADMIN — FLUTICASONE FUROATE AND VILANTEROL TRIFENATATE 1 PUFF: 200; 25 POWDER RESPIRATORY (INHALATION) at 08:18

## 2019-07-17 RX ADMIN — FLUOXETINE 10 MG: 10 CAPSULE ORAL at 08:18

## 2019-07-17 NOTE — PROGRESS NOTES
07/17/19 0900   Team Meeting   Meeting Type Daily Rounds   Team Members Present   Team Members Present Physician;Nurse;; Other (Discipline and Name)   Physician Team Member Dr Martha Gay Team Member Khalif Desai, Donato Cage [facility] Management Team Member [facility]    OT Team Member Dimas Diop    Other (Discipline and Name) Bailey Jaffe discussed at treatment rounds today, no medication changes made   EAC bed may be available next week

## 2019-07-17 NOTE — PROGRESS NOTES
Patient isolative to room  Comes out for meal sonly  preoccupied with BP this am   BP WNL  Denies suicidal ideation Nam 92  Will continue to monitor

## 2019-07-17 NOTE — PROGRESS NOTES
[facility]iatric Psychiatry  Psychiatrists  Progress Note    Patient Name: Estela Chung  MRN: [id number]  DOS: 07/17/19    Chief Complaint:  paranoid delusions    Interval History: Patient remains somatically preoccupied  She states she is restless and anxious due to invega despite the fact that this was discontinued 2 days ago  She has been tolerating being without oxygen during the day quite well  Has chronic paranoid delusions  Mental Status Exam [Per above +]  Appearance: disheveled, fair hygiene; no abnormal involuntary movements noted  Behavior: superficially related  Speech: pressured  Mood: anxious  Affect: constricted  Thought process: tangential, illogical  Thought content: paranoid and somatic delusions; denies SI/HI  Perceptual disturbances: denies AH/VH  Cognition: oriented to all domains     Insight: poor  Judgement: poor      Current Facility-Administered Medications:     acetaminophen (TYLENOL) tablet 650 mg, 650 mg, Oral, Q6H PRN, Nate Crowley MD    albuterol (PROVENTIL HFA,VENTOLIN HFA) inhaler 2 puff, 2 puff, Inhalation, Q4H PRN, ABILIO Pizarro    aluminum-magnesium hydroxide-simethicone (MYLANTA) 200-200-20 mg/5 mL oral suspension 15 mL, 15 mL, Oral, Q4H PRN, Lynn Martinez MD, 15 mL at 05/12/19 2221    ammonium lactate (LAC-HYDRIN) 12 % lotion, , Topical, BID PRN, ABILIO Pizarro, 1 application at 85/60/25 1114    benzonatate (TESSALON PERLES) capsule 100 mg, 100 mg, Oral, TID PRN, Nate Crowley MD, 100 mg at 06/27/19 0928    benztropine (COGENTIN) injection 1 mg, 1 mg, Intramuscular, Q8H PRN, Tari Ge MD    cloZAPine (CLOZARIL) tablet 50 mg, 50 mg, Oral, HS, Tari Ge MD, 50 mg at 07/16/19 2106    FLUoxetine (PROzac) capsule 10 mg, 10 mg, Oral, Daily, ABILIO Pizarro, 10 mg at 07/17/19 0818    fluticasone-vilanterol (BREO ELLIPTA) 200-25 MCG/INH inhaler 1 puff, 1 puff, Inhalation, Daily, Jane Boogie MD, 1 puff at 07/17/19 0818    haloperidol lactate (HALDOL) injection 5 mg, 5 mg, Intramuscular, Daily PRN, Chelle Heredia MD    ketotifen (ZADITOR) 0 025 % ophthalmic solution 1 drop, 1 drop, Right Eye, BID PRN, Shauna Butcher MD, 1 drop at 07/10/19 1310    levothyroxine tablet 125 mcg, 125 mcg, Oral, Early Morning, Zeeshan Taylor MD, 125 mcg at 07/17/19 0506    magnesium hydroxide (MILK OF MAGNESIA) 400 mg/5 mL oral suspension 30 mL, 30 mL, Oral, Daily PRN, Mihai Glover MD    OLANZapine (ZyPREXA) IM injection 5 mg, 5 mg, Intramuscular, Q8H PRN, Chelle Heredia MD    pantoprazole (PROTONIX) EC tablet 40 mg, 40 mg, Oral, Early Morning, Zeeshan Taylor MD, 40 mg at 07/17/19 0506    polyethylene glycol (MIRALAX) packet 17 g, 17 g, Oral, Daily PRN, ABILIO Clarke    polyvinyl alcohol (LIQUIFILM TEARS) 1 4 % ophthalmic solution 1 drop, 1 drop, Both Eyes, Q3H PRN, Merry Rivera PA-C    theophylline (JEF-24) 24 hr capsule 200 mg, 200 mg, Oral, Daily, Zeeshan Taylor MD, 200 mg at 07/17/19 0818    traZODone (DESYREL) tablet 25 mg, 25 mg, Oral, HS PRN, Zeeshan Taylor MD    Vitals:    07/17/19 1533   BP: 107/70   Pulse: 93   Resp: 17   Temp: 97 7 °F (36 5 °C)   SpO2: 92%       Lab/work up: EKG pending    Assessment:     Axis I:  · Schizoaffective disorder; has been treatment resistant  Axis II:  · OCPD  · Cluster B traits  Axis III:  - COPD with asthma, stable compression fracture of L4   Axis IV:  · Limited social support, chronically homeless, financial problems, on disability, noncompliant with treatment    Progress: limited    Plan:   1  Disposition: Patient meets criteria for ongoing acute inpatient treatment due to being danger to self 2/2 psychosis and poor self care  Patient will be discharged when there is an absence of psychosis [id number]  2  Legal status: 304  3   Psychopharmacologic interventions:   -  continue clozaril 50mg po qhs - olanzapine 5mg IM if patient refuses clozaril   - continue prozac 10mg po daily   - Continue to monitor psychosis  4  Medical comorbidities: Hospitalist following PRN  5  Other therapies: Individual/group/milieu therapy as appropriate   6  Social issues: Case management following to arrange disposition - likely transfer to [facility]  7   Precautions: Routine q15min checks, vitals qshift    Lexi Andrew MD  07/17/19

## 2019-07-17 NOTE — NURSING NOTE
Patient has been in bed asleep, on 1L O2 during bedtime  Preoccupied re:O2 during the day, no concerns or distress noted this shift  On routine checks, will continue to monitor

## 2019-07-17 NOTE — CASE MANAGEMENT
305 paperwork copies and labeled, placed in scan bin  Original in chart and patient copy given to JAZMINE Asher from [facility]  Writer provided update to [place] from [facility]  CM will continue to follow and provide service as needed

## 2019-07-18 PROCEDURE — 99232 SBSQ HOSP IP/OBS MODERATE 35: CPT | Performed by: PSYCHIATRY & NEUROLOGY

## 2019-07-18 RX ADMIN — LEVOTHYROXINE SODIUM 125 MCG: 125 TABLET ORAL at 05:56

## 2019-07-18 RX ADMIN — CLOZAPINE 50 MG: 25 TABLET ORAL at 21:35

## 2019-07-18 RX ADMIN — THEOPHYLLINE ANHYDROUS 200 MG: 200 CAPSULE, EXTENDED RELEASE ORAL at 08:35

## 2019-07-18 RX ADMIN — FLUOXETINE 10 MG: 10 CAPSULE ORAL at 08:35

## 2019-07-18 RX ADMIN — FLUTICASONE FUROATE AND VILANTEROL TRIFENATATE 1 PUFF: 200; 25 POWDER RESPIRATORY (INHALATION) at 08:34

## 2019-07-18 RX ADMIN — PANTOPRAZOLE SODIUM 40 MG: 40 TABLET, DELAYED RELEASE ORAL at 05:56

## 2019-07-18 NOTE — PROGRESS NOTES
[address] [facility]  Psychiatrists  Progress Note    Patient Name: Pradeep Crowley  MRN: [id number]  DOS: 07/18/19    Chief Complaint:  paranoid delusions    Interval History: Patient continues to be paranoid  She states her soup smelled strange yesterday and wonders if it was poisoned  She claims that another staff member smelled the same thing and agreed, that it was thrown out  She is tolerating being off O2 during the day but is nervous about something going wrong  Mental Status Exam [Per above +]  Appearance: poor grooming; fair hygiene; no abnormal involuntary movements noted  Behavior: superficially related, calm  Speech: wnl  Mood: anxious  Affect: constricted, stable  Thought process: tangential  Thought content: paranoid delusions elicited; denies SI/HI  Perceptual disturbances: denies Ah/VH  Cognition: oriented to all domains     Insight: poor  Judgement: poor      Current Facility-Administered Medications:     acetaminophen (TYLENOL) tablet 650 mg, 650 mg, Oral, Q6H PRN, Michelene Kawasaki, MD    albuterol (PROVENTIL HFA,VENTOLIN HFA) inhaler 2 puff, 2 puff, Inhalation, Q4H PRN, Rene Aliment, CRNP    aluminum-magnesium hydroxide-simethicone (MYLANTA) 200-200-20 mg/5 mL oral suspension 15 mL, 15 mL, Oral, Q4H PRN, Forest Reza MD, 15 mL at 05/12/19 2221    ammonium lactate (LAC-HYDRIN) 12 % lotion, , Topical, BID PRN, Rene Aliment, CRNP, 1 application at 78/23/58 1114    benzonatate (TESSALON PERLES) capsule 100 mg, 100 mg, Oral, TID PRN, Michelene Kawasaki, MD, 100 mg at 06/27/19 0928    benztropine (COGENTIN) injection 1 mg, 1 mg, Intramuscular, Q8H PRN, Blake Zambrano MD    cloZAPine (CLOZARIL) tablet 50 mg, 50 mg, Oral, HS, Blake Zambrano MD, 50 mg at 07/17/19 2141    FLUoxetine (PROzac) capsule 10 mg, 10 mg, Oral, Daily, Rene Aliment, CRNP, 10 mg at 07/18/19 0835    fluticasone-vilanterol (BREO ELLIPTA) 200-25 MCG/INH inhaler 1 puff, 1 puff, Inhalation, Daily, Itzel Kuhn MD, 1 puff at 07/18/19 0834    haloperidol lactate (HALDOL) injection 5 mg, 5 mg, Intramuscular, Daily PRN, Deb Jansen MD    ketotifen (ZADITOR) 0 025 % ophthalmic solution 1 drop, 1 drop, Right Eye, BID PRN, Perico Nava MD, 1 drop at 07/10/19 1310    levothyroxine tablet 125 mcg, 125 mcg, Oral, Early Morning, Erik Schumacher MD, 125 mcg at 07/18/19 0556    magnesium hydroxide (MILK OF MAGNESIA) 400 mg/5 mL oral suspension 30 mL, 30 mL, Oral, Daily PRN, Shawn Connors MD    OLANZapine (ZyPREXA) IM injection 5 mg, 5 mg, Intramuscular, Q8H PRN, Deb Jansen MD    pantoprazole (PROTONIX) EC tablet 40 mg, 40 mg, Oral, Early Morning, Erik Schumacher MD, 40 mg at 07/18/19 0556    polyethylene glycol (MIRALAX) packet 17 g, 17 g, Oral, Daily PRN, ABILIO Rodríguez    polyvinyl alcohol (LIQUIFILM TEARS) 1 4 % ophthalmic solution 1 drop, 1 drop, Both Eyes, Q3H PRN, Merry Rivera PA-C    theophylline (JEF-24) 24 hr capsule 200 mg, 200 mg, Oral, Daily, Erik Schumacher MD, 200 mg at 07/18/19 0835    traZODone (DESYREL) tablet 25 mg, 25 mg, Oral, HS PRN, Erik Schumacher MD    Vitals:    07/18/19 1509   BP: 100/68   Pulse: 80   Resp: 18   Temp: 97 6 °F (36 4 °C)   SpO2: 92%       Lab/work up: EKG pending    Assessment:     Axis I:  · Schizoaffective disorder; has been treatment resistant  Axis II:  · OCPD  · Cluster B traits  Axis III:  - COPD with asthma, stable compression fracture of L4   Axis IV:  · Limited social support, chronically homeless, financial problems, on disability, noncompliant with treatment    Progress: limited    Plan:   1  Disposition: Patient meets criteria for ongoing acute inpatient treatment due to being danger to self 2/2 psychosis and poor self care  Patient will be discharged when there is an absence of psychosis [id number]  2  Legal status: 304  3   Psychopharmacologic interventions:   -  continue clozaril 50mg po qhs - olanzapine 5mg IM if patient refuses clozaril; CBC tomorrow   - continue prozac 10mg po daily   - Continue to monitor psychosis  4  Medical comorbidities: Hospitalist following PRN  5  Other therapies: Individual/group/milieu therapy as appropriate   6  Social issues: Case management following to arrange disposition - likely transfer to [facility]  7   Precautions: Routine q15min checks, vitals qshift    Marly Ariza MD  07/18/19

## 2019-07-18 NOTE — PROGRESS NOTES
07/18/19 1100 07/18/19 1430   Activity/Group Checklist   Group Exercise  (seated acoustic chill exercises ) Pet therapy   Attendance Attended Did not attend; Refused   Attendance Duration (min) 16-30  --    Interactions Interacted appropriately  (pt  concerned about her pulse oxygen level )  --    Affect/Mood Other (Comment)  (Pt  tolerated the task well without extra O2)  --    Goals Achieved Able to engage in interactions  --

## 2019-07-18 NOTE — PROGRESS NOTES
07/18/19 0900   Team Meeting   Meeting Type Daily Rounds   Team Members Present   Team Members Present Physician;Nurse;   Physician Team Member Dr Faheem Rodriguez Team Member [id number] Management Team Member Wilner Addison Pt discussed at treatment rounds today, no medication changes made

## 2019-07-18 NOTE — PROGRESS NOTES
Pt attended  education  Pt anxious and flat affect  Pt noted that she is frustrated with he depression and trying to understand her medication management need  Pt mentioned she does not feel like eating due to anxiety  F/u with nursing and [facility] with pt update  Pt able to self reflect when prompted  Continue to provide therepeuetic group support      07/18/19 2649   Activity/Group Checklist   Group Other (Comment)  (mh education)   Attendance Attended   Attendance Duration (min) 31-45   Interactions Interacted appropriately   Affect/Mood Blunted/flat   Goals Achieved Identified feelings; Discussed coping strategies; Discussed discharge plans; Able to listen to others; Able to engage in interactions; Able to self-disclose

## 2019-07-18 NOTE — PLAN OF CARE
Problem: Alteration in Thoughts and Perception  Goal: Verbalize thoughts and feelings  Description  Interventions:  - Promote a nonjudgmental and trusting relationship with the patient through active listening and therapeutic communication  - Assess patient's level of functioning, behavior and potential for risk  - Engage patient in 1 on 1 interactions for a minimum of 15 minutes each session  - Encourage patient to express fears, feelings, frustrations, and discuss symptoms    - [place] patient to reality, help patient recognize reality-based thinking   - Administer medications as ordered and assess for potential side effects  - Provide the patient education related to the signs and symptoms of the illness and desired effects of prescribed medications  Outcome: Progressing  Goal: Refrain from acting on delusional thinking/internal stimuli  Description  Interventions:  - Monitor patient closely, per order   - Utilize least restrictive measures   - Set reasonable limits, give positive feedback for acceptable   - Administer medications as ordered and monitor of potential side effects  Outcome: Progressing  Goal: Agree to be compliant with medication regime, as prescribed and report medication side effects  Description  Interventions:  - Offer appropriate PRN medication and supervise ingestion; conduct aims, as needed   Outcome: Progressing  Goal: Recognize dysfunctional thoughts, communicate reality-based thoughts at the time of discharge  Description  Interventions:  - Provide medication and psycho-education to assist patient in compliance and developing insight into his/her illness   Outcome: Progressing  Goal: Complete daily ADLs, including personal hygiene independently, as able  Description  Interventions:  - Observe, teach, and assist patient with ADLS  - Monitor and promote a balance of rest/activity, with adequate nutrition and elimination   Outcome: Progressing     Problem: Risk for Self Injury/Neglect  Goal: Treatment Goal: Remain safe during length of stay, learn and adopt new coping skills, and be free of self-injurious ideation, impulses and acts at the time of discharge  Outcome: Progressing  Goal: Verbalize thoughts and feelings  Description  Interventions:  - Assess and re-assess patient's lethality and potential for self-injury  - Engage patient in 1:1 interactions, daily, for a minimum of 15 minutes  - Encourage patient to express feelings, fears, frustrations, hopes  - Establish rapport/trust with patient   Outcome: Progressing  Goal: Refrain from harming self  Description  Interventions:  - Monitor patient closely, per order  - Develop a trusting relationship  - Supervise medication ingestion, monitor effects and side effects   Outcome: Progressing  Goal: Recognize maladaptive responses and adopt new coping mechanisms  Outcome: Progressing  Goal: Complete daily ADLs, including personal hygiene independently, as able  Description  Interventions:  - Observe, teach, and assist patient with ADLS  - Monitor and promote a balance of rest/activity, with adequate nutrition and elimination  Outcome: Progressing     Problem: Depression  Goal: Verbalize thoughts and feelings  Description  Interventions:  - Assess and re-assess patient's level of risk   - Engage patient in 1:1 interactions, daily, for a minimum of 15 minutes   - Encourage patient to express feelings, fears, frustrations, hopes   Outcome: Progressing  Goal: Refrain from harming self  Description  Interventions:  - Monitor patient closely, per order   - Supervise medication ingestion, monitor effects and side effects   Outcome: Progressing  Goal: Refrain from isolation  Description  Interventions:  - Develop a trusting relationship   - Encourage socialization   Outcome: Progressing  Goal: Refrain from self-neglect  Outcome: Progressing  Goal: Complete daily ADLs, including personal hygiene independently, as able  Description  Interventions:  - Observe, teach, and assist patient with ADLS  -  Monitor and promote a balance of rest/activity, with adequate nutrition and elimination   Outcome: Progressing     Problem: Anxiety  Goal: Anxiety is at manageable level  Description  Interventions:  - Assess and monitor patient's anxiety level  - Monitor for signs and symptoms of anxiety both physical and emotional (heart palpitations, chest pain, shortness of breath, headaches, nausea, feeling jumpy, restlessness, irritable, apprehensive)  - Collaborate with interdisciplinary team and initiate plan and interventions as ordered    - [place] patient to unit/surroundings  - Explain treatment plan  - Encourage participation in care  - Encourage verbalization of concerns/fears  - Identify coping mechanisms  - Assist in developing anxiety-reducing skills  - Administer/offer alternative therapies  - Limit or eliminate stimulants  Outcome: Progressing     Problem: Prexisting or High Potential for Compromised Skin Integrity  Goal: Skin integrity is maintained or improved  Description  INTERVENTIONS:  - Identify patients at risk for skin breakdown  - Assess and monitor skin integrity  - Assess and monitor nutrition and hydration status  - Monitor labs (i e  albumin)  - Assess for incontinence   - Turn and reposition patient  - Assist with mobility/ambulation  - Relieve pressure over bony prominences  - Avoid friction and shearing  - Provide appropriate hygiene as needed including keeping skin clean and dry  - Evaluate need for skin moisturizer/barrier cream  - Collaborate with interdisciplinary team (i e  Nutrition, Rehabilitation, etc )   - Patient/family teaching  Outcome: Progressing

## 2019-07-18 NOTE — PROGRESS NOTES
Patient in bed sleeping when the shift started  Breathing pattern even and unlabored  On 1 L oxygen  Safety checks ongoing  Will continue to monitor

## 2019-07-18 NOTE — PROGRESS NOTES
Pt preoccupied with frequent SPO2 checks  No distress noted  VSS  Pt OOB for meals and snack  Pt denies SI/HI  Pt compliant with meal and medication regime

## 2019-07-18 NOTE — PROGRESS NOTES
Patient preoccupied with oxygen and BP today   Vitals WNL   Patient keep asking the staff to recheck BP and spo2   Denies suicidal ideation/AH/VH  Very anxious due to another patient had a choking episode in the day room  States '' I don't want a ECT I will choke too '' support provided   Will continue to monitor

## 2019-07-18 NOTE — PROGRESS NOTES
Pt visible in milieu for meals and snacks only  Pt isolative to room  Pt preoccupied with having SPO2 checked frequently  Pt displays no distress noted  Pt also preoccupied with nasal cannula at HS  Denies SI/HI/VH/AH   VSS

## 2019-07-19 LAB
BASOPHILS # BLD AUTO: 0 THOUSANDS/ΜL (ref 0–0.1)
BASOPHILS NFR BLD AUTO: 1 % (ref 0–1)
EOSINOPHIL # BLD AUTO: 0.2 THOUSAND/ΜL (ref 0–0.4)
EOSINOPHIL NFR BLD AUTO: 3 % (ref 0–6)
ERYTHROCYTE [DISTWIDTH] IN BLOOD BY AUTOMATED COUNT: 14.5 %
HCT VFR BLD AUTO: 38.9 % (ref 36–46)
HGB BLD-MCNC: 12.7 G/DL (ref 12–16)
LYMPHOCYTES # BLD AUTO: 3.5 THOUSANDS/ΜL (ref 0.5–4)
LYMPHOCYTES NFR BLD AUTO: 49 % (ref 25–45)
MCH RBC QN AUTO: 30.1 PG (ref 26–34)
MCHC RBC AUTO-ENTMCNC: 32.6 G/DL (ref 31–36)
MCV RBC AUTO: 92 FL (ref 80–100)
MONOCYTES # BLD AUTO: 0.7 THOUSAND/ΜL (ref 0.2–0.9)
MONOCYTES NFR BLD AUTO: 10 % (ref 1–10)
NEUTROPHILS # BLD AUTO: 2.7 THOUSANDS/ΜL (ref 1.8–7.8)
NEUTS SEG NFR BLD AUTO: 38 % (ref 45–65)
PLATELET # BLD AUTO: 211 THOUSANDS/UL (ref 150–450)
PMV BLD AUTO: 9.6 FL (ref 8.9–12.7)
RBC # BLD AUTO: 4.22 MILLION/UL (ref 4–5.2)
WBC # BLD AUTO: 7.1 THOUSAND/UL (ref 4.5–11)

## 2019-07-19 PROCEDURE — 99238 HOSP IP/OBS DSCHRG MGMT 30/<: CPT | Performed by: PSYCHIATRY & NEUROLOGY

## 2019-07-19 PROCEDURE — 85025 COMPLETE CBC W/AUTO DIFF WBC: CPT | Performed by: INTERNAL MEDICINE

## 2019-07-19 RX ORDER — CLOZAPINE 25 MG/1
75 TABLET ORAL
Status: DISCONTINUED | OUTPATIENT
Start: 2019-07-19 | End: 2019-07-23 | Stop reason: HOSPADM

## 2019-07-19 RX ADMIN — PANTOPRAZOLE SODIUM 40 MG: 40 TABLET, DELAYED RELEASE ORAL at 05:52

## 2019-07-19 RX ADMIN — CLOZAPINE 75 MG: 25 TABLET ORAL at 21:19

## 2019-07-19 RX ADMIN — FLUOXETINE 10 MG: 10 CAPSULE ORAL at 08:17

## 2019-07-19 RX ADMIN — THEOPHYLLINE ANHYDROUS 200 MG: 200 CAPSULE, EXTENDED RELEASE ORAL at 08:17

## 2019-07-19 RX ADMIN — FLUTICASONE FUROATE AND VILANTEROL TRIFENATATE 1 PUFF: 200; 25 POWDER RESPIRATORY (INHALATION) at 08:18

## 2019-07-19 RX ADMIN — LEVOTHYROXINE SODIUM 125 MCG: 125 TABLET ORAL at 05:52

## 2019-07-19 NOTE — PLAN OF CARE
Problem: Alteration in Thoughts and Perception  Goal: Treatment Goal: Gain control of psychotic behaviors/thinking, reduce/eliminate presenting symptoms and demonstrate improved reality functioning upon discharge  Outcome: Progressing  Goal: Verbalize thoughts and feelings  Description  Interventions:  - Promote a nonjudgmental and trusting relationship with the patient through active listening and therapeutic communication  - Assess patient's level of functioning, behavior and potential for risk  - Engage patient in 1 on 1 interactions for a minimum of 15 minutes each session  - Encourage patient to express fears, feelings, frustrations, and discuss symptoms    - [place] patient to reality, help patient recognize reality-based thinking   - Administer medications as ordered and assess for potential side effects  - Provide the patient education related to the signs and symptoms of the illness and desired effects of prescribed medications  Outcome: Progressing  Goal: Refrain from acting on delusional thinking/internal stimuli  Description  Interventions:  - Monitor patient closely, per order   - Utilize least restrictive measures   - Set reasonable limits, give positive feedback for acceptable   - Administer medications as ordered and monitor of potential side effects  Outcome: Progressing  Goal: Agree to be compliant with medication regime, as prescribed and report medication side effects  Description  Interventions:  - Offer appropriate PRN medication and supervise ingestion; conduct aims, as needed   Outcome: Progressing  Goal: Attend and participate in unit activities, including therapeutic, recreational, and educational groups  Description  Interventions:  - Provide therapeutic and educational activities daily, encourage attendance and participation, and document same in the medical record   Outcome: Progressing  Goal: Recognize dysfunctional thoughts, communicate reality-based thoughts at the time of discharge  Description  Interventions:  - Provide medication and psycho-education to assist patient in compliance and developing insight into his/her illness   Outcome: Progressing  Goal: Complete daily ADLs, including personal hygiene independently, as able  Description  Interventions:  - Observe, teach, and assist patient with ADLS  - Monitor and promote a balance of rest/activity, with adequate nutrition and elimination   Outcome: Progressing     Problem: Ineffective Coping  Goal: Identifies ineffective coping skills  Outcome: Progressing  Goal: Identifies healthy coping skills  Outcome: Progressing  Goal: Demonstrates healthy coping skills  Outcome: Progressing  Goal: Participates in unit activities  Description  Interventions:  - Provide therapeutic environment   - Provide required programming   - Redirect inappropriate behaviors   Outcome: Progressing  Goal: Patient/Family participate in treatment and DC plans  Description  Interventions:  - Provide therapeutic environment  Outcome: Progressing  Goal: Patient/Family verbalizes awareness of resources  Outcome: Progressing  Goal: Understands least restrictive measures  Description  Interventions:  - Utilize least restrictive behavior  Outcome: Progressing  Goal: Free from restraint events  Description  - Utilize least restrictive measures   - Provide behavioral interventions   - Redirect inappropriate behaviors   Outcome: Progressing     Problem: Risk for Self Injury/Neglect  Goal: Treatment Goal: Remain safe during length of stay, learn and adopt new coping skills, and be free of self-injurious ideation, impulses and acts at the time of discharge  Outcome: Progressing  Goal: Verbalize thoughts and feelings  Description  Interventions:  - Assess and re-assess patient's lethality and potential for self-injury  - Engage patient in 1:1 interactions, daily, for a minimum of 15 minutes  - Encourage patient to express feelings, fears, frustrations, hopes  - Establish rapport/trust with patient   Outcome: Progressing  Goal: Refrain from harming self  Description  Interventions:  - Monitor patient closely, per order  - Develop a trusting relationship  - Supervise medication ingestion, monitor effects and side effects   Outcome: Progressing  Goal: Attend and participate in unit activities, including therapeutic, recreational, and educational groups  Description  Interventions:  - Provide therapeutic and educational activities daily, encourage attendance and participation, and document same in the medical record  - Obtain collateral information, encourage visitation and family involvement in care   Outcome: Progressing  Goal: Recognize maladaptive responses and adopt new coping mechanisms  Outcome: Progressing  Goal: Complete daily ADLs, including personal hygiene independently, as able  Description  Interventions:  - Observe, teach, and assist patient with ADLS  - Monitor and promote a balance of rest/activity, with adequate nutrition and elimination  Outcome: Progressing     Problem: Depression  Goal: Treatment Goal: Demonstrate behavioral control of depressive symptoms, verbalize feelings of improved mood/affect, and adopt new coping skills prior to discharge  Outcome: Progressing  Goal: Verbalize thoughts and feelings  Description  Interventions:  - Assess and re-assess patient's level of risk   - Engage patient in 1:1 interactions, daily, for a minimum of 15 minutes   - Encourage patient to express feelings, fears, frustrations, hopes   Outcome: Progressing  Goal: Refrain from harming self  Description  Interventions:  - Monitor patient closely, per order   - Supervise medication ingestion, monitor effects and side effects   Outcome: Progressing  Goal: Refrain from isolation  Description  Interventions:  - Develop a trusting relationship   - Encourage socialization   Outcome: Progressing  Goal: Refrain from self-neglect  Outcome: Progressing  Goal: Attend and participate in unit activities, including therapeutic, recreational, and educational groups  Description  Interventions:  - Provide therapeutic and educational activities daily, encourage attendance and participation, and document same in the medical record   Outcome: Progressing  Goal: Complete daily ADLs, including personal hygiene independently, as able  Description  Interventions:  - Observe, teach, and assist patient with ADLS  -  Monitor and promote a balance of rest/activity, with adequate nutrition and elimination   Outcome: Progressing     Problem: Anxiety  Goal: Anxiety is at manageable level  Description  Interventions:  - Assess and monitor patient's anxiety level  - Monitor for signs and symptoms of anxiety both physical and emotional (heart palpitations, chest pain, shortness of breath, headaches, nausea, feeling jumpy, restlessness, irritable, apprehensive)  - Collaborate with interdisciplinary team and initiate plan and interventions as ordered    - [place] patient to unit/surroundings  - Explain treatment plan  - Encourage participation in care  - Encourage verbalization of concerns/fears  - Identify coping mechanisms  - Assist in developing anxiety-reducing skills  - Administer/offer alternative therapies  - Limit or eliminate stimulants  Outcome: Progressing     Problem: DISCHARGE PLANNING - CARE MANAGEMENT  Goal: Discharge to post-acute care or home with appropriate resources  Description  INTERVENTIONS:  - Conduct assessment to determine patient/family and health care team treatment goals, and need for post-acute services based on payer coverage, community resources, and patient preferences, and barriers to discharge  - Address psychosocial, clinical, and financial barriers to discharge as identified in assessment in conjunction with the patient/family and health care team  - Arrange appropriate level of post-acute services according to patients   needs and preference and payer coverage in collaboration with the physician and health care team  - Communicate with and update the patient/family, physician, and health care team regarding progress on the discharge plan  - Arrange appropriate transportation to post-acute venues  Outcome: Progressing     Problem: Prexisting or High Potential for Compromised Skin Integrity  Goal: Skin integrity is maintained or improved  Description  INTERVENTIONS:  - Identify patients at risk for skin breakdown  - Assess and monitor skin integrity  - Assess and monitor nutrition and hydration status  - Monitor labs (i e  albumin)  - Assess for incontinence   - Turn and reposition patient  - Assist with mobility/ambulation  - Relieve pressure over bony prominences  - Avoid friction and shearing  - Provide appropriate hygiene as needed including keeping skin clean and dry  - Evaluate need for skin moisturizer/barrier cream  - Collaborate with interdisciplinary team (i e  Nutrition, Rehabilitation, etc )   - Patient/family teaching  Outcome: Progressing

## 2019-07-19 NOTE — PROGRESS NOTES
Patient is alert and oriented times 4  Is pleasant and cooperative  Visual during meal time  Denies respiratory discomfort this time  Denies SI/HI and hallucinations  Is medication and meals compliant    Will continue to monitor for support  Patient offered  Refreshments  Will continue to monitor

## 2019-07-19 NOTE — PROGRESS NOTES
07/19/19 0900   Team Meeting   Meeting Type Daily Rounds   Team Members Present   Team Members Present Physician;Nurse;   Physician Team Member Dr Mati Barron Team Member Kristina Levy    Care Management Team Member Radhika Kelly      Pt discussed at treatment rounds today, pt less somatic, no medication changes made

## 2019-07-19 NOTE — PROGRESS NOTES
Alert,awake and pleasant with staff  Reported good sleep  Pulse OX 94% on RA,but continues to reports to feels scared to be off the oxygen  Med compliant  No other complaints at this time,denies any issues at this time  Will continue to monitor closely

## 2019-07-19 NOTE — DISCHARGE SUMMARY
[address] [facility] Geriatric Psychiatry  Psychiatrists  Progress Note    Patient Name: Sarwat Song  MRN: [id number]  DOS: 07/19/19    Chief Complaint:  paranoid delusions    Interval History: Patient remains paranoid  Denies AH  She worries frequently about side effects from the medications  Today she states "I feel funny" but unable to describe further and asks if medication titration can be delayed until Monday  Per staff, she has been less obsessive about hypoxia  Mental Status Exam [Per above +]  Appearance: limited grooming; fair hygiene no abnormal involuntary movements  Behavior: no apparent distress, no psychomotor agitation/regardation; superficially related  Speech: wnl  Mood: anxious  Affect: constricted, stable  Thought process: linear, illogical  Thought content: paranoid and other idiosyncratic delusions are ongoing; denies SI/HI  Perceptual disturbances: denies AH/VH  Cognition: oriented to all domains    Insight: poor  Judgement: poor      Current Facility-Administered Medications:     acetaminophen (TYLENOL) tablet 650 mg, 650 mg, Oral, Q6H PRN, Chris Rosenberg MD    albuterol (PROVENTIL HFA,VENTOLIN HFA) inhaler 2 puff, 2 puff, Inhalation, Q4H PRN, ABILIO Sheridan    aluminum-magnesium hydroxide-simethicone (MYLANTA) 200-200-20 mg/5 mL oral suspension 15 mL, 15 mL, Oral, Q4H PRN, Cristino Grady MD, 15 mL at 05/12/19 2221    ammonium lactate (LAC-HYDRIN) 12 % lotion, , Topical, BID PRN, ABILIO Sheridan, 1 application at 89/87/31 1114    benzonatate (TESSALON PERLES) capsule 100 mg, 100 mg, Oral, TID PRN, Chris Rosenberg MD, 100 mg at 06/27/19 0928    benztropine (COGENTIN) injection 1 mg, 1 mg, Intramuscular, Q8H PRN, Quita Marti MD    cloZAPine (CLOZARIL) tablet 75 mg, 75 mg, Oral, HS, Quita Marti MD    FLUoxetine (PROzac) capsule 10 mg, 10 mg, Oral, Daily, ABILIO Sheridan, 10 mg at 07/19/19 0817   fluticasone-vilanterol (BREO ELLIPTA) 200-25 MCG/INH inhaler 1 puff, 1 puff, Inhalation, Daily, Vani Zee MD, 1 puff at 07/19/19 0818    haloperidol lactate (HALDOL) injection 5 mg, 5 mg, Intramuscular, Daily PRN, Rosemarie Sutherland MD    ketotifen (ZADITOR) 0 025 % ophthalmic solution 1 drop, 1 drop, Right Eye, BID PRN, Fortino Yadav MD, 1 drop at 07/10/19 1310    levothyroxine tablet 125 mcg, 125 mcg, Oral, Early Morning, Niels Le MD, 125 mcg at 07/19/19 0552    magnesium hydroxide (MILK OF MAGNESIA) 400 mg/5 mL oral suspension 30 mL, 30 mL, Oral, Daily PRN, Arlington Mortimer, MD    OLANZapine (ZyPREXA) IM injection 5 mg, 5 mg, Intramuscular, Q8H PRN, Rosemarie Sutherland MD    pantoprazole (PROTONIX) EC tablet 40 mg, 40 mg, Oral, Early Morning, Niels Le MD, 40 mg at 07/19/19 3735    polyethylene glycol (MIRALAX) packet 17 g, 17 g, Oral, Daily PRN, Myrle Dense, CRNP    polyvinyl alcohol (LIQUIFILM TEARS) 1 4 % ophthalmic solution 1 drop, 1 drop, Both Eyes, Q3H PRN, Merry Rivera PA-C    theophylline (JEF-24) 24 hr capsule 200 mg, 200 mg, Oral, Daily, Niels Le MD, 200 mg at 07/19/19 0817    traZODone (DESYREL) tablet 25 mg, 25 mg, Oral, HS PRN, Niels Le MD    Vitals:    07/19/19 0845   BP:    Pulse:    Resp:    Temp:    SpO2: 94%       Lab/work up:   ANC 2 7 this morning    Assessment:     Axis I:  · Schizoaffective disorder; bipolar type; has been treatment resistant  Axis II:  · OCPD  · Cluster B traits  Axis III:  - COPD with asthma, stable compression fracture of L4   Axis IV:  · Limited social support, chronically homeless, financial problems, on disability, noncompliant with treatment    Progress: limited    Plan:   1  Disposition: Patient meets criteria for ongoing acute inpatient treatment due to being danger to self 2/2 psychosis and poor self care  Patient will be discharged when there is an absence of psychosis [id number]  2  Legal status: 304  3   Psychopharmacologic interventions:   -  increase clozaril to 75mg po qhs - olanzapine 5mg IM if patient refuses clozaril;    - continue prozac 10mg po daily   - Continue to monitor psychosis  4  Medical comorbidities: Hospitalist following PRN  5  Other therapies: Individual/group/milieu therapy as appropriate   6  Social issues: Case management following to arrange disposition - likely transfer to [facility]  7   Precautions: Routine q15min checks, vitals qshift    Tegan Oakes MD  07/19/19

## 2019-07-19 NOTE — PROGRESS NOTES
Currently in bed , O2 at 1 L  In bed with eyes closed and respirations noted  Not voicing any SI's  Monitored on safety checks

## 2019-07-19 NOTE — PROGRESS NOTES
Pharmacy Clozapine Monitoring Progress Note     Walter Dillon is receiving 50 mg clozapine daily at bedtime  Assessment/Plan:    Current phase of treatment is initation/titration  **If clozapine therapy is interrupted for more than 48 hours, therapy MUST be restarted at the initial titration dose to avoid hypotension, bradycardia, and syncope  **    Patient is eligible to receive clozapine and the [address] monitoring frequency is weekly per clozapine REMS  Most recent [address] was updated into the REMS program      The most recent [address] was   Lab Results   Component Value Date    NEUTROABS 2 70 07/19/2019    and the next lab is due on 7/26/19  Last Clozapine level (if available):    No results found for: CLOZAPINE  No results found for: NCLOZIP    Pharmacist Recommendations:    1  Patient is REMS eligible and ANC was updated with weekly frequency  Next [address] due 7/26/19      2  Recommend starting senna-docusate 8 6 - 50mg twice daily for prophylaxis with clozapine initiation  Monitoring: Adverse Effect Suggested Monitoring Patient's Status    Agranulocytosis ANC baseline and then repeat weekly for first 6 months and every 2 weeks for the second 6 months  Maintenance after one year of therapy every month  The most recent [address] was   Lab Results   Component Value Date    NEUTROABS 2 70 07/19/2019       This ANC is considered normal range (ANC >/= 1500/mcL)  Continue current treatment and monitoring course  Respiratory Depression Please ensure patient is not on concomitant respiratory lowering medications such as benzodiazepines, sedative hypnotics (eg  zolpidem) This patient is not on respiratory depression lowering medications   Myocarditis Baseline and weekly EKG, CRP, BNP, and troponin  Weekly symptomatic complaints of fatigue, dyspnea, tachycardia, chest pain, palpitations, and fever for the first 4 weeks    Last EKG Results on 7/17/2019  showed:  normal sinus rhythm and left axis deviation    Last QTC on 7/17/2019 was   0   Lab Value Date/Time    QTCINT 481 07/17/2019 0617       Last BNP was No results found for: NTBNP    Last Troponin was  0   Lab Value Date/Time    TROPONINI <0 01 05/01/2019 1318    TROPONINI <0 04 05/10/2015 1948      Orthostatic hypotension/  bradycardia Blood pressure and vital signs      Monitor blood pressure and orthostatic hypotension at least twice daily upon initiation and continue to follow at least once daily afterwards  /68 (BP Location: Right arm)   Pulse 76   Temp 98 °F (36 7 °C) (Temporal)   Resp 16   Ht 5' 4" (1 626 m)   Wt 66 5 kg (146 lb 9 7 oz)   SpO2 94%   BMI 25 16 kg/m²             Constipation (bowel obstruction) Assess at baseline and weekly during first four months of therapy  Docusate 100mg BID and Miralax 17 grams daily recommended initially  There is no prophylactic bowel regimen however there is PRN miralax ordered for constipation  Would recommend standing stool softner    Sialorrhea Assess at baseline and weekly during first four months of therapy  May be managed using sublingual anticholinergic preparations (atropine 1% eye drops)  Patient is not experiencing sialorrhea  This will continue to be monitored  Please note that per current REMS protocol the provider can submit a treatment rationale that justifies clozapine treatment even if patient parameters are outside of REMS recommendations  The Clozapine REMS is an FDA-mandated program implemented by the manufacturers of clozapine  ([employer]  com/CpmgClozapineUI/home  u)  Pharmacy will continue to follow patient with team, thank you    Electronically signed by: Ruth Yadav, Kiran, Andrew 47, Clinical Pharmacist - Psychiatry

## 2019-07-19 NOTE — CASE MANAGEMENT
Patient remains somatic, preoccupied with oxygen, and keeps asking when she will be going down to [facility] and stating, "I'm not ready " Patient redirected  CM will continue to follow and provide services as needed

## 2019-07-19 NOTE — PROGRESS NOTES
07/19/19 1430   Activity/Group Checklist   Group Other (Comment)  (positive coping)   Attendance Attended   Attendance Duration (min) 46-60   Interactions Interacted appropriately   Affect/Mood Appropriate   Goals Achieved Identified feelings; Discussed coping strategies; Able to listen to others; Able to engage in interactions; Able to reflect/comment on own behavior;Able to manage/cope with feelings; Able to self-disclose

## 2019-07-19 NOTE — PROGRESS NOTES
07/19/19 1000   Activity/Group Checklist   Group Other (Comment)  (Art Therapy Group- Open Choice; Discussion)   Attendance Attended   Attendance Duration (min) 46-60   Interactions Interacted appropriately   Affect/Mood Appropriate   Goals Achieved Identified feelings; Able to listen to others; Able to engage in interactions; Identified triggers; Discussed coping strategies; Able to self-disclose; Able to recieve feedback; Able to give feedback to another  (Able to engage art materials; full participation)     Patient displayed persistence in working through challenges during group

## 2019-07-19 NOTE — PROGRESS NOTES
Pt reluctantly gave her self credit for getting through almost one entire week without supplemental oxygen  She expressed current challenge as dealing with a cataract that is not ready for removal Pt attended group today with min  prompting  07/19/19 1100   Activity/Group Checklist   Group Wellness  (ocean relaxation guided imagery )   Attendance Attended   Attendance Duration (min) 16-30   Interactions Interacted appropriately   Affect/Mood Calm   Goals Achieved Able to engage in interactions; Identified feelings; Discussed coping strategies; Able to listen to others

## 2019-07-20 RX ADMIN — FLUOXETINE 10 MG: 10 CAPSULE ORAL at 08:33

## 2019-07-20 RX ADMIN — CLOZAPINE 75 MG: 25 TABLET ORAL at 21:05

## 2019-07-20 RX ADMIN — THEOPHYLLINE ANHYDROUS 200 MG: 200 CAPSULE, EXTENDED RELEASE ORAL at 08:33

## 2019-07-20 RX ADMIN — FLUTICASONE FUROATE AND VILANTEROL TRIFENATATE 1 PUFF: 200; 25 POWDER RESPIRATORY (INHALATION) at 08:33

## 2019-07-20 RX ADMIN — PANTOPRAZOLE SODIUM 40 MG: 40 TABLET, DELAYED RELEASE ORAL at 05:35

## 2019-07-20 RX ADMIN — LEVOTHYROXINE SODIUM 125 MCG: 125 TABLET ORAL at 05:35

## 2019-07-20 NOTE — PROGRESS NOTES
Yassine Patrick  was seen for continuing care and reviewed with staff  Progress Note - Behavioral Health   Yassine Patrick 60 y o  female MRN: @MRN   Unit/Bed#Daved Osgood 752-21 Encounter: [id number]      Report from staff regarding this patient received and discussed, and records reviewed prior to seeing this patient   Nursing report that patient has somatic complaints, sometimes paranoid, demanding what she needs, unpredictable, she agreed to go to a long term subacute unit soon  She likes to stay in the hospital     Today, patient complaints about the food is the same old stuffs, feeling bland, slight lightheaded, she asked the doctor to remind the nurse to take her BP again to be sure it is in the right range, she had no other complaints, her younger sister Dario Issa is visiting her this afternoon  Sleep: " good, no wake up in the middle of the night"  Appetite: " fair, but the food is not good"    Medication side effects: " lightheaded today"    Mental Status Evaluation:    Appearance:  Well groom, lying in bed, looked comfotable   Behavior:  Cooperative, fair eye contact   Mood:  " I am fine, hopeful"   Affect: restricted   Speech:  Clear fluent, soft   Thought Process:  Goal oriented   Thought Content:  No delusions, no paranoid thoughts   Perceptual Disturbances:  No hallucinations   Risk Potential: Agreed on safety contract   Sensorium:  intact   Memory:  Recent memory intact   Consciousness:  X 3   Insight:  Intact, understand the symptoms    Judgment: Fair, taking medications, wiling to go to long term unit   Motor Activity: No hand tremors or EPS noticed         Laboratory results:  I have personally reviewed all pertinent laboratory results  BMP: No results for input(s): NA, K, CL, CO2, BUN in the last 72 hours      Invalid input(s): CREA, GLU  Vitals:    07/20/19 0650   BP: 121/56   Pulse: 80   Resp: 18   Temp: 97 9 °F (36 6 °C)   SpO2: 98%        Assessment/Plan   Principal Problem:    Schizoaffective disorder, bipolar type ([facility] )  Active Problems:    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[facility] )    Acquired hypothyroidism    Gastroesophageal reflux disease without esophagitis    Allergic conjunctivitis of right eye      Recommended Treatment:     Planned medication and treatment changes: All current active medications have been reviewed  Continue treatment with group therapy, milieu therapy, occupational therapy and medication management  Patient complaints on the food not good enough, and she felt lightheaded today and asked to check on BP again  She denied negative thoughts, feels hopeful, willing to go to long term rehab unit  We will continue on current medications, supportive therapy         Nimco Fair MD  [phone]

## 2019-07-20 NOTE — PROGRESS NOTES
Sleeping in bed with O2 at 1 L   Monitored on Q 7 minute safety checks  Not voicing any SI's  Appears to be resting calmly

## 2019-07-20 NOTE — PROGRESS NOTES
Pt isolative to self in room  Med and meal compliant  Pt remains somatic, preoccupied with her "oxygen levels" and "feeling light-headed"  Pt encouraged to get out of her bed and attend group therapy, pt refused  Will continue to encourage group participation for a healthy recovery

## 2019-07-21 RX ADMIN — FLUTICASONE FUROATE AND VILANTEROL TRIFENATATE 1 PUFF: 200; 25 POWDER RESPIRATORY (INHALATION) at 08:22

## 2019-07-21 RX ADMIN — THEOPHYLLINE ANHYDROUS 200 MG: 200 CAPSULE, EXTENDED RELEASE ORAL at 08:22

## 2019-07-21 RX ADMIN — PANTOPRAZOLE SODIUM 40 MG: 40 TABLET, DELAYED RELEASE ORAL at 05:39

## 2019-07-21 RX ADMIN — CLOZAPINE 75 MG: 25 TABLET ORAL at 21:06

## 2019-07-21 RX ADMIN — FLUOXETINE 10 MG: 10 CAPSULE ORAL at 08:22

## 2019-07-21 RX ADMIN — LEVOTHYROXINE SODIUM 125 MCG: 125 TABLET ORAL at 05:36

## 2019-07-21 NOTE — PROGRESS NOTES
Patient very somatic  States '' I feel like my lungs shut down ''   SPO2 94 % Room air  Lungs assessed   Lungs WNL   Patient asking if I  Need oxygen do you have it ? Do you have canula   Spent 10 minutes with patient   Will continue to monitor

## 2019-07-21 NOTE — NURSING NOTE
Pt visited by her sister, "Joseph Boles" in the beginning of the shift  Pt had many questions regarding her transfer to E[facility] such as "what kind of clothes can I wear", "when can I go out", etc  Pt accepted increase of clozaril from 7/19 with no questions or difficulty  Became angry at writer when commended for needing less O2  Stated, "stopping smoking has nothing to do with it  When I leave I am going to smoke and I only smoke 1-3 cigarettes a day"  Pt's sister stated, "you know that's not true" and pt stated angrily  " I'm not talking about this anymore"  Visible in the community in the evening  Continues to express wishes that her sister will allow her to move in with her upon discharge or help her get an apartment  Insight is poor  Presently in bed with 02 @ 1L  Will continue to support and maintain q 7 minute checks

## 2019-07-21 NOTE — PROGRESS NOTES
Patient remains somatic   Patient stated '' my sister visited me yesterday I told her to do an attachment to her house with a small kitchen so that I can cook ''   Denies suicidal ideation   Pre[facility] with [facility]   Will continue to monitor

## 2019-07-21 NOTE — PLAN OF CARE
Problem: Alteration in Thoughts and Perception  Goal: Treatment Goal: Gain control of psychotic behaviors/thinking, reduce/eliminate presenting symptoms and demonstrate improved reality functioning upon discharge  Outcome: Progressing  Goal: Verbalize thoughts and feelings  Description  Interventions:  - Promote a nonjudgmental and trusting relationship with the patient through active listening and therapeutic communication  - Assess patient's level of functioning, behavior and potential for risk  - Engage patient in 1 on 1 interactions for a minimum of 15 minutes each session  - Encourage patient to express fears, feelings, frustrations, and discuss symptoms    - [place] patient to reality, help patient recognize reality-based thinking   - Administer medications as ordered and assess for potential side effects  - Provide the patient education related to the signs and symptoms of the illness and desired effects of prescribed medications  Outcome: Progressing  Goal: Refrain from acting on delusional thinking/internal stimuli  Description  Interventions:  - Monitor patient closely, per order   - Utilize least restrictive measures   - Set reasonable limits, give positive feedback for acceptable   - Administer medications as ordered and monitor of potential side effects  Outcome: Progressing  Goal: Agree to be compliant with medication regime, as prescribed and report medication side effects  Description  Interventions:  - Offer appropriate PRN medication and supervise ingestion; conduct aims, as needed   Outcome: Progressing  Goal: Attend and participate in unit activities, including therapeutic, recreational, and educational groups  Description  Interventions:  - Provide therapeutic and educational activities daily, encourage attendance and participation, and document same in the medical record   Outcome: Progressing  Goal: Recognize dysfunctional thoughts, communicate reality-based thoughts at the time of discharge  Description  Interventions:  - Provide medication and psycho-education to assist patient in compliance and developing insight into his/her illness   Outcome: Progressing  Goal: Complete daily ADLs, including personal hygiene independently, as able  Description  Interventions:  - Observe, teach, and assist patient with ADLS  - Monitor and promote a balance of rest/activity, with adequate nutrition and elimination   Outcome: Progressing     Problem: Ineffective Coping  Goal: Identifies ineffective coping skills  Outcome: Progressing  Goal: Identifies healthy coping skills  Outcome: Progressing  Goal: Demonstrates healthy coping skills  Outcome: Progressing  Goal: Participates in unit activities  Description  Interventions:  - Provide therapeutic environment   - Provide required programming   - Redirect inappropriate behaviors   Outcome: Progressing  Goal: Patient/Family participate in treatment and DC plans  Description  Interventions:  - Provide therapeutic environment  Outcome: Progressing  Goal: Patient/Family verbalizes awareness of resources  Outcome: Progressing  Goal: Understands least restrictive measures  Description  Interventions:  - Utilize least restrictive behavior  Outcome: Progressing  Goal: Free from restraint events  Description  - Utilize least restrictive measures   - Provide behavioral interventions   - Redirect inappropriate behaviors   Outcome: Progressing     Problem: Risk for Self Injury/Neglect  Goal: Treatment Goal: Remain safe during length of stay, learn and adopt new coping skills, and be free of self-injurious ideation, impulses and acts at the time of discharge  Outcome: Progressing  Goal: Verbalize thoughts and feelings  Description  Interventions:  - Assess and re-assess patient's lethality and potential for self-injury  - Engage patient in 1:1 interactions, daily, for a minimum of 15 minutes  - Encourage patient to express feelings, fears, frustrations, hopes  - Establish rapport/trust with patient   Outcome: Progressing  Goal: Refrain from harming self  Description  Interventions:  - Monitor patient closely, per order  - Develop a trusting relationship  - Supervise medication ingestion, monitor effects and side effects   Outcome: Progressing  Goal: Attend and participate in unit activities, including therapeutic, recreational, and educational groups  Description  Interventions:  - Provide therapeutic and educational activities daily, encourage attendance and participation, and document same in the medical record  - Obtain collateral information, encourage visitation and family involvement in care   Outcome: Progressing  Goal: Recognize maladaptive responses and adopt new coping mechanisms  Outcome: Progressing  Goal: Complete daily ADLs, including personal hygiene independently, as able  Description  Interventions:  - Observe, teach, and assist patient with ADLS  - Monitor and promote a balance of rest/activity, with adequate nutrition and elimination  Outcome: Progressing     Problem: Depression  Goal: Treatment Goal: Demonstrate behavioral control of depressive symptoms, verbalize feelings of improved mood/affect, and adopt new coping skills prior to discharge  Outcome: Progressing  Goal: Verbalize thoughts and feelings  Description  Interventions:  - Assess and re-assess patient's level of risk   - Engage patient in 1:1 interactions, daily, for a minimum of 15 minutes   - Encourage patient to express feelings, fears, frustrations, hopes   Outcome: Progressing  Goal: Refrain from harming self  Description  Interventions:  - Monitor patient closely, per order   - Supervise medication ingestion, monitor effects and side effects   Outcome: Progressing  Goal: Refrain from isolation  Description  Interventions:  - Develop a trusting relationship   - Encourage socialization   Outcome: Progressing  Goal: Refrain from self-neglect  Outcome: Progressing  Goal: Attend and participate in unit activities, including therapeutic, recreational, and educational groups  Description  Interventions:  - Provide therapeutic and educational activities daily, encourage attendance and participation, and document same in the medical record   Outcome: Progressing  Goal: Complete daily ADLs, including personal hygiene independently, as able  Description  Interventions:  - Observe, teach, and assist patient with ADLS  -  Monitor and promote a balance of rest/activity, with adequate nutrition and elimination   Outcome: Progressing     Problem: Anxiety  Goal: Anxiety is at manageable level  Description  Interventions:  - Assess and monitor patient's anxiety level  - Monitor for signs and symptoms of anxiety both physical and emotional (heart palpitations, chest pain, shortness of breath, headaches, nausea, feeling jumpy, restlessness, irritable, apprehensive)  - Collaborate with interdisciplinary team and initiate plan and interventions as ordered    - [place] patient to unit/surroundings  - Explain treatment plan  - Encourage participation in care  - Encourage verbalization of concerns/fears  - Identify coping mechanisms  - Assist in developing anxiety-reducing skills  - Administer/offer alternative therapies  - Limit or eliminate stimulants  Outcome: Progressing     Problem: DISCHARGE PLANNING - CARE MANAGEMENT  Goal: Discharge to post-acute care or home with appropriate resources  Description  INTERVENTIONS:  - Conduct assessment to determine patient/family and health care team treatment goals, and need for post-acute services based on payer coverage, community resources, and patient preferences, and barriers to discharge  - Address psychosocial, clinical, and financial barriers to discharge as identified in assessment in conjunction with the patient/family and health care team  - Arrange appropriate level of post-acute services according to patients   needs and preference and payer coverage in collaboration with the physician and health care team  - Communicate with and update the patient/family, physician, and health care team regarding progress on the discharge plan  - Arrange appropriate transportation to post-acute venues  Outcome: Progressing     Problem: Prexisting or High Potential for Compromised Skin Integrity  Goal: Skin integrity is maintained or improved  Description  INTERVENTIONS:  - Identify patients at risk for skin breakdown  - Assess and monitor skin integrity  - Assess and monitor nutrition and hydration status  - Monitor labs (i e  albumin)  - Assess for incontinence   - Turn and reposition patient  - Assist with mobility/ambulation  - Relieve pressure over bony prominences  - Avoid friction and shearing  - Provide appropriate hygiene as needed including keeping skin clean and dry  - Evaluate need for skin moisturizer/barrier cream  - Collaborate with interdisciplinary team (i e  Nutrition, Rehabilitation, etc )   - Patient/family teaching  Outcome: Progressing

## 2019-07-21 NOTE — PROGRESS NOTES
Jasmete Bone  was seen for continuing care and reviewed with staff  Progress Note - Behavioral Health   Jasmeet Bone 60 y o  female MRN: @MRN   Unit/Bed#[facility] 191-05 Encounter: [id number]      Report from staff regarding this patient received and discussed, and records reviewed prior to seeing this patient   Nursing report that patient is attention seeking, and complaints about short of breath needs Oxygen treatment, her Pulse Oximeter reading was within normal range, also she was preoccupied about moving to another unit for long term stay  Today, patient said that she is a little bit anxious about moving to another unit next week, she is hopeful, feels weak but she believed she can walk without assistance  She was happy that her sister visited her yesterday  Sleep: " good"  Appetite: "OK"    Medication side effects: "feels weak"    Mental Status Evaluation:    Appearance:  Well groom, lying in bed   Behavior:  Cooperative, fair eye contact   Mood:  " I am a little bit anxious"   Affect: restricted   Speech:  Clear fluent, soft, slow   Thought Process:  Goal oriented   Thought Content:  No delusions, no paranoid thoughts   Perceptual Disturbances:  No hallucinations   Risk Potential: Agreed on safety contract   Sensorium:  intact   Memory:  Recent memory intact   Consciousness:  X 3   Insight:  Intact, understand the symptoms    Judgment: Fair, taking medications   Motor Activity: No hand tremors or EPS noticed         Laboratory results:  I have personally reviewed all pertinent laboratory results  BMP: No results for input(s): NA, K, CL, CO2, BUN in the last 72 hours      Invalid input(s): CREA, GLU  Vitals:    07/21/19 0703   BP: 122/75   Pulse: 75   Resp: 18   Temp: (!) 97 4 °F (36 3 °C)   SpO2: 93%        Assessment/Plan   Principal Problem:    Schizoaffective disorder, bipolar type (HCC)  Active Problems:    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HCC)    Acquired hypothyroidism    Gastroesophageal reflux disease without esophagitis    Allergic conjunctivitis of right eye      Recommended Treatment:     Planned medication and treatment changes: All current active medications have been reviewed  Continue treatment with group therapy, milieu therapy, occupational therapy and medication management  Patient worried about her breathing problem, some anxious feeling about moving to another unit, no negative thoughts, takes meds, follow rules, will continue current medications, supportive therapy          Rolando Glover MD  [phone]

## 2019-07-21 NOTE — PROGRESS NOTES
Patient appears to be sleeping calmly  O2  with nasal canula at 1 L   No signs of respiratory distress noted  Monitored Q7 min for safety  Will continue to monitor

## 2019-07-22 PROCEDURE — 99231 SBSQ HOSP IP/OBS SF/LOW 25: CPT | Performed by: NURSE PRACTITIONER

## 2019-07-22 RX ADMIN — LEVOTHYROXINE SODIUM 125 MCG: 125 TABLET ORAL at 05:05

## 2019-07-22 RX ADMIN — THEOPHYLLINE ANHYDROUS 200 MG: 200 CAPSULE, EXTENDED RELEASE ORAL at 08:57

## 2019-07-22 RX ADMIN — FLUTICASONE FUROATE AND VILANTEROL TRIFENATATE 1 PUFF: 200; 25 POWDER RESPIRATORY (INHALATION) at 08:57

## 2019-07-22 RX ADMIN — PANTOPRAZOLE SODIUM 40 MG: 40 TABLET, DELAYED RELEASE ORAL at 05:05

## 2019-07-22 RX ADMIN — FLUOXETINE 10 MG: 10 CAPSULE ORAL at 08:57

## 2019-07-22 RX ADMIN — CLOZAPINE 75 MG: 25 TABLET ORAL at 21:32

## 2019-07-22 NOTE — PROGRESS NOTES
Pt anxious and somatic but pleasant and med-compliant  Good appetite at lunch  VSS  Pox 97% RA  Denied SI  Did not attend group  Monitored for safety and support

## 2019-07-22 NOTE — PROGRESS NOTES
Progress Note - Behavioral Health   Amberly Lowe 60 y o  female MRN: [id number]  Unit/Bed#: [facility] 651-57 Encounter: [id number]    The patient was seen for continuing care and reviewed with treatment team  Staff reports that the patient remains calm, cooperative, and compliant with medications  Attending groups and improved interaction with peers and staff is noted  During the assessment the patient brightened on approach and was pleasant and cooperative during the interview  Patient reports that the medication change has improved her symptoms  Denies any adverse side effects  Lab results and vital signs are frequently reviewed  Continues to remain paranoid, but somatic symptoms have decreased  Denies any thoughts to hurt herself or others  Denies any auditory or visual hallucinations  Hygiene remains fair  Continues to come out of bed for meals, but refuses groups  Patient is scheduled to be transferred to [facility] tomorrow  No current medication changes      Mental Status Evaluation:  Appearance:  Marginal/poor hygiene   Behavior:  cooperative, guarded and Superficial   Fund of knowledge  aware of current events and Aware of past history   Speech:   Language: Normal rate and Normal volume  No overt abnormality   Mood:  improving   Affect:   Associations: labile  Intact   Thought Process:  Circumstantial   Thought Content:  Obsessive ruminations; less somatically preoccupied   Perceptual Disturbances: Denies hallucinations and does not appear to be responding to internal stimuli   Risk Potential: No suicidal or homicidal ideation   Orientation  Oriented x 3   Memory No deficits   Attention/Concentration attention span appeared shorter than expected for age   Insight:  limited   Judgment: Limited   Gait/Station: normal gait/station and normal balance   Motor Activity: No abnormal movement noted     Progress Toward Goals: slightly improving    Assessment/Plan    Principal Problem:    Schizoaffective disorder, bipolar type [facility])  Active Problems:    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[address] )    Acquired hypothyroidism    Gastroesophageal reflux disease without esophagitis    Allergic conjunctivitis of right eye      Recommended Treatment: Continue with pharmacotherapy, group therapy, milieu therapy and occupational therapy  The patient will be maintained on the following medications:    Current Facility-Administered Medications:  acetaminophen 650 mg Oral Q6H PRN Niels Le MD   albuterol 2 puff Inhalation Q4H PRN Myrle Dense, CRNP   aluminum-magnesium hydroxide-simethicone 15 mL Oral Q4H PRN Sae Wilson MD   ammonium lactate  Topical BID PRN Myrle Dense, CRNP   benzonatate 100 mg Oral TID PRN Niels Le MD   benztropine 1 mg Intramuscular Q8H PRN Rosemarie Sutherland MD   cloZAPine 75 mg Oral HS Rosemarie Sutherland MD   FLUoxetine 10 mg Oral Daily Myrle Dense, CRNP   fluticasone-vilanterol 1 puff Inhalation Daily Vani Zee MD   haloperidol lactate 5 mg Intramuscular Daily PRN Rosemarie Sutherland MD   ketotifen 1 drop Right Eye BID PRN Fortino Yadav MD   levothyroxine 125 mcg Oral Early Morning Niels Le MD   magnesium hydroxide 30 mL Oral Daily PRN [place] Mortimer, MD   OLANZapine 5 mg Intramuscular Q8H PRN Rosemarie Sutherland MD   pantoprazole 40 mg Oral Early Morning Niels Le MD   polyethylene glycol 17 g Oral Daily PRN Myrle Dense, CRNP   polyvinyl alcohol 1 drop Both Eyes Q3H PRN Merry Rivera PA-C   theophylline 200 mg Oral Daily Niels Le MD   traZODone 25 mg Oral HS PRN Niels Le MD       Risks, benefits and possible side effects of Medications:   Risks, benefits, and possible side effects of medications explained to patient and patient verbalizes understanding

## 2019-07-22 NOTE — PROGRESS NOTES
07/22/19 0900   Team Meeting   Meeting Type Daily Rounds   Team Members Present   Team Members Present Physician;Nurse;;Occupational Therapist;Other (Discipline and Name)  (Pharmacist )   Physician Team Member Dr Betty Dimas Team Member [address] Management Team Member Marilou Francisco    OT Team Member Rosalba Hidalgo    Other (Discipline and Name) Carlos Bajwa      Pt discussed at treatment rounds today, no medication changes made, planned discharge to [facility] tomorrow at 4 pm

## 2019-07-22 NOTE — PLAN OF CARE
Problem: Alteration in Thoughts and Perception  Goal: Treatment Goal: Gain control of psychotic behaviors/thinking, reduce/eliminate presenting symptoms and demonstrate improved reality functioning upon discharge  Outcome: Progressing  Goal: Verbalize thoughts and feelings  Description  Interventions:  - Promote a nonjudgmental and trusting relationship with the patient through active listening and therapeutic communication  - Assess patient's level of functioning, behavior and potential for risk  - Engage patient in 1 on 1 interactions for a minimum of 15 minutes each session  - Encourage patient to express fears, feelings, frustrations, and discuss symptoms    - [place] patient to reality, help patient recognize reality-based thinking   - Administer medications as ordered and assess for potential side effects  - Provide the patient education related to the signs and symptoms of the illness and desired effects of prescribed medications  Outcome: Progressing  Goal: Refrain from acting on delusional thinking/internal stimuli  Description  Interventions:  - Monitor patient closely, per order   - Utilize least restrictive measures   - Set reasonable limits, give positive feedback for acceptable   - Administer medications as ordered and monitor of potential side effects  Outcome: Progressing  Goal: Agree to be compliant with medication regime, as prescribed and report medication side effects  Description  Interventions:  - Offer appropriate PRN medication and supervise ingestion; conduct aims, as needed   Outcome: Progressing  Goal: Attend and participate in unit activities, including therapeutic, recreational, and educational groups  Description  Interventions:  - Provide therapeutic and educational activities daily, encourage attendance and participation, and document same in the medical record   Outcome: Progressing  Goal: Recognize dysfunctional thoughts, communicate reality-based thoughts at the time of discharge  Description  Interventions:  - Provide medication and psycho-education to assist patient in compliance and developing insight into his/her illness   Outcome: Progressing  Goal: Complete daily ADLs, including personal hygiene independently, as able  Description  Interventions:  - Observe, teach, and assist patient with ADLS  - Monitor and promote a balance of rest/activity, with adequate nutrition and elimination   Outcome: Progressing     Problem: Ineffective Coping  Goal: Identifies ineffective coping skills  Outcome: Progressing  Goal: Identifies healthy coping skills  Outcome: Progressing  Goal: Demonstrates healthy coping skills  Outcome: Progressing  Goal: Participates in unit activities  Description  Interventions:  - Provide therapeutic environment   - Provide required programming   - Redirect inappropriate behaviors   Outcome: Progressing  Goal: Patient/Family participate in treatment and DC plans  Description  Interventions:  - Provide therapeutic environment  Outcome: Progressing  Goal: Patient/Family verbalizes awareness of resources  Outcome: Progressing  Goal: Understands least restrictive measures  Description  Interventions:  - Utilize least restrictive behavior  Outcome: Progressing  Goal: Free from restraint events  Description  - Utilize least restrictive measures   - Provide behavioral interventions   - Redirect inappropriate behaviors   Outcome: Progressing     Problem: Risk for Self Injury/Neglect  Goal: Treatment Goal: Remain safe during length of stay, learn and adopt new coping skills, and be free of self-injurious ideation, impulses and acts at the time of discharge  Outcome: Progressing  Goal: Verbalize thoughts and feelings  Description  Interventions:  - Assess and re-assess patient's lethality and potential for self-injury  - Engage patient in 1:1 interactions, daily, for a minimum of 15 minutes  - Encourage patient to express feelings, fears, frustrations, hopes  - Establish rapport/trust with patient   Outcome: Progressing  Goal: Refrain from harming self  Description  Interventions:  - Monitor patient closely, per order  - Develop a trusting relationship  - Supervise medication ingestion, monitor effects and side effects   Outcome: Progressing  Goal: Attend and participate in unit activities, including therapeutic, recreational, and educational groups  Description  Interventions:  - Provide therapeutic and educational activities daily, encourage attendance and participation, and document same in the medical record  - Obtain collateral information, encourage visitation and family involvement in care   Outcome: Progressing  Goal: Recognize maladaptive responses and adopt new coping mechanisms  Outcome: Progressing  Goal: Complete daily ADLs, including personal hygiene independently, as able  Description  Interventions:  - Observe, teach, and assist patient with ADLS  - Monitor and promote a balance of rest/activity, with adequate nutrition and elimination  Outcome: Progressing     Problem: Depression  Goal: Treatment Goal: Demonstrate behavioral control of depressive symptoms, verbalize feelings of improved mood/affect, and adopt new coping skills prior to discharge  Outcome: Progressing  Goal: Verbalize thoughts and feelings  Description  Interventions:  - Assess and re-assess patient's level of risk   - Engage patient in 1:1 interactions, daily, for a minimum of 15 minutes   - Encourage patient to express feelings, fears, frustrations, hopes   Outcome: Progressing  Goal: Refrain from harming self  Description  Interventions:  - Monitor patient closely, per order   - Supervise medication ingestion, monitor effects and side effects   Outcome: Progressing  Goal: Refrain from isolation  Description  Interventions:  - Develop a trusting relationship   - Encourage socialization   Outcome: Progressing  Goal: Refrain from self-neglect  Outcome: Progressing  Goal: Attend and participate in unit activities, including therapeutic, recreational, and educational groups  Description  Interventions:  - Provide therapeutic and educational activities daily, encourage attendance and participation, and document same in the medical record   Outcome: Progressing  Goal: Complete daily ADLs, including personal hygiene independently, as able  Description  Interventions:  - Observe, teach, and assist patient with ADLS  -  Monitor and promote a balance of rest/activity, with adequate nutrition and elimination   Outcome: Progressing     Problem: Anxiety  Goal: Anxiety is at manageable level  Description  Interventions:  - Assess and monitor patient's anxiety level  - Monitor for signs and symptoms of anxiety both physical and emotional (heart palpitations, chest pain, shortness of breath, headaches, nausea, feeling jumpy, restlessness, irritable, apprehensive)  - Collaborate with interdisciplinary team and initiate plan and interventions as ordered    - [place] patient to unit/surroundings  - Explain treatment plan  - Encourage participation in care  - Encourage verbalization of concerns/fears  - Identify coping mechanisms  - Assist in developing anxiety-reducing skills  - Administer/offer alternative therapies  - Limit or eliminate stimulants  Outcome: Progressing     Problem: DISCHARGE PLANNING - CARE MANAGEMENT  Goal: Discharge to post-acute care or home with appropriate resources  Description  INTERVENTIONS:  - Conduct assessment to determine patient/family and health care team treatment goals, and need for post-acute services based on payer coverage, community resources, and patient preferences, and barriers to discharge  - Address psychosocial, clinical, and financial barriers to discharge as identified in assessment in conjunction with the patient/family and health care team  - Arrange appropriate level of post-acute services according to patients   needs and preference and payer coverage in collaboration with the physician and health care team  - Communicate with and update the patient/family, physician, and health care team regarding progress on the discharge plan  - Arrange appropriate transportation to post-acute venues  Outcome: Progressing     Problem: Prexisting or High Potential for Compromised Skin Integrity  Goal: Skin integrity is maintained or improved  Description  INTERVENTIONS:  - Identify patients at risk for skin breakdown  - Assess and monitor skin integrity  - Assess and monitor nutrition and hydration status  - Monitor labs (i e  albumin)  - Assess for incontinence   - Turn and reposition patient  - Assist with mobility/ambulation  - Relieve pressure over bony prominences  - Avoid friction and shearing  - Provide appropriate hygiene as needed including keeping skin clean and dry  - Evaluate need for skin moisturizer/barrier cream  - Collaborate with interdisciplinary team (i e  Nutrition, Rehabilitation, etc )   - Patient/family teaching  Outcome: Progressing

## 2019-07-22 NOTE — PROGRESS NOTES
Patient appears to be sleeping  No respiratory distress noted  Oxygen 1 L via NC  Monitored Q 7 minutes for safety  Continue to monitor

## 2019-07-22 NOTE — PROGRESS NOTES
Patient is alert and oriented times 3  Is pleasant and cooperative  Demanding   Reports hard time breathing this morning around lunch time  and states " I think my lungs are collapsing and maybe ill need more oxygen  Patient has been saturating  between 95 and 98 room air  Pt denies SI/HI or hallucination  Medication and meals complaint eating today 75/0/75 of meals  Patient has been encouraged for self care and to stay  out of her room more often  Will continue to monitor for support

## 2019-07-23 ENCOUNTER — HOSPITAL ENCOUNTER (INPATIENT)
Facility: HOSPITAL | Age: 64
LOS: 342 days | Discharge: HOME | DRG: 750 | End: 2020-06-29
Attending: PSYCHIATRY & NEUROLOGY | Admitting: PSYCHIATRY & NEUROLOGY
Payer: COMMERCIAL

## 2019-07-23 VITALS
TEMPERATURE: 98.6 F | HEIGHT: 64 IN | DIASTOLIC BLOOD PRESSURE: 60 MMHG | SYSTOLIC BLOOD PRESSURE: 112 MMHG | OXYGEN SATURATION: 95 % | RESPIRATION RATE: 18 BRPM | BODY MASS INDEX: 25.03 KG/M2 | WEIGHT: 146.61 LBS | HEART RATE: 85 BPM

## 2019-07-23 DIAGNOSIS — K59.00 CONSTIPATION: ICD-10-CM

## 2019-07-23 DIAGNOSIS — K21.9 HIATAL HERNIA WITH GASTROESOPHAGEAL REFLUX: ICD-10-CM

## 2019-07-23 DIAGNOSIS — K21.9 GASTROESOPHAGEAL REFLUX DISEASE WITHOUT ESOPHAGITIS: ICD-10-CM

## 2019-07-23 DIAGNOSIS — I47.1 SVT (SUPRAVENTRICULAR TACHYCARDIA) (HCC): ICD-10-CM

## 2019-07-23 DIAGNOSIS — K08.409: Primary | ICD-10-CM

## 2019-07-23 DIAGNOSIS — H04.123 DRY EYES: ICD-10-CM

## 2019-07-23 DIAGNOSIS — L85.3 DRY SKIN: ICD-10-CM

## 2019-07-23 DIAGNOSIS — E03.9 ACQUIRED HYPOTHYROIDISM: ICD-10-CM

## 2019-07-23 DIAGNOSIS — K44.9 HIATAL HERNIA WITH GASTROESOPHAGEAL REFLUX: ICD-10-CM

## 2019-07-23 DIAGNOSIS — F25.0 SCHIZOAFFECTIVE DISORDER, BIPOLAR TYPE (HCC): ICD-10-CM

## 2019-07-23 DIAGNOSIS — Z91.89 AT RISK FOR ASPIRATION: ICD-10-CM

## 2019-07-23 DIAGNOSIS — M25.552 LEFT HIP PAIN: ICD-10-CM

## 2019-07-23 DIAGNOSIS — J44.9 COPD WITH ASTHMA (HCC): ICD-10-CM

## 2019-07-23 DIAGNOSIS — K21.9 GERD (GASTROESOPHAGEAL REFLUX DISEASE): ICD-10-CM

## 2019-07-23 DIAGNOSIS — T78.2XXA ANAPHYLAXIS: ICD-10-CM

## 2019-07-23 DIAGNOSIS — R04.2 COUGH WITH HEMOPTYSIS: ICD-10-CM

## 2019-07-23 DIAGNOSIS — K08.109: ICD-10-CM

## 2019-07-23 LAB
ALBUMIN SERPL BCP-MCNC: 3.5 G/DL (ref 3–5.2)
ALP SERPL-CCNC: 101 U/L (ref 43–122)
ALT SERPL W P-5'-P-CCNC: 18 U/L (ref 9–52)
ANION GAP SERPL CALCULATED.3IONS-SCNC: 8 MMOL/L (ref 5–14)
AST SERPL W P-5'-P-CCNC: 21 U/L (ref 14–36)
BASOPHILS # BLD AUTO: 0.1 THOUSANDS/ΜL (ref 0–0.1)
BASOPHILS NFR BLD AUTO: 1 % (ref 0–1)
BILIRUB SERPL-MCNC: 0.3 MG/DL
BUN SERPL-MCNC: 16 MG/DL (ref 5–25)
CALCIUM SERPL-MCNC: 9.2 MG/DL (ref 8.4–10.2)
CHLORIDE SERPL-SCNC: 106 MMOL/L (ref 97–108)
CO2 SERPL-SCNC: 28 MMOL/L (ref 22–30)
CREAT SERPL-MCNC: 0.7 MG/DL (ref 0.6–1.2)
EOSINOPHIL # BLD AUTO: 0.2 THOUSAND/ΜL (ref 0–0.4)
EOSINOPHIL NFR BLD AUTO: 2 % (ref 0–6)
ERYTHROCYTE [DISTWIDTH] IN BLOOD BY AUTOMATED COUNT: 14.6 %
GFR SERPL CREATININE-BSD FRML MDRD: 93 ML/MIN/1.73SQ M
GLUCOSE SERPL-MCNC: 126 MG/DL (ref 70–99)
HCT VFR BLD AUTO: 40 % (ref 36–46)
HGB BLD-MCNC: 13.2 G/DL (ref 12–16)
LYMPHOCYTES # BLD AUTO: 2.7 THOUSANDS/ΜL (ref 0.5–4)
LYMPHOCYTES NFR BLD AUTO: 34 % (ref 25–45)
MCH RBC QN AUTO: 29.7 PG (ref 26–34)
MCHC RBC AUTO-ENTMCNC: 32.9 G/DL (ref 31–36)
MCV RBC AUTO: 91 FL (ref 80–100)
MONOCYTES # BLD AUTO: 0.8 THOUSAND/ΜL (ref 0.2–0.9)
MONOCYTES NFR BLD AUTO: 10 % (ref 1–10)
NEUTROPHILS # BLD AUTO: 4.2 THOUSANDS/ΜL (ref 1.8–7.8)
NEUTS SEG NFR BLD AUTO: 53 % (ref 45–65)
PLATELET # BLD AUTO: 244 THOUSANDS/UL (ref 150–450)
PMV BLD AUTO: 9.1 FL (ref 8.9–12.7)
POTASSIUM SERPL-SCNC: 3.9 MMOL/L (ref 3.6–5)
PROT SERPL-MCNC: 6.6 G/DL (ref 5.9–8.4)
RBC # BLD AUTO: 4.43 MILLION/UL (ref 4–5.2)
SODIUM SERPL-SCNC: 142 MMOL/L (ref 137–147)
WBC # BLD AUTO: 7.9 THOUSAND/UL (ref 4.5–11)

## 2019-07-23 PROCEDURE — 85025 COMPLETE CBC W/AUTO DIFF WBC: CPT | Performed by: FAMILY MEDICINE

## 2019-07-23 PROCEDURE — 80053 COMPREHEN METABOLIC PANEL: CPT | Performed by: FAMILY MEDICINE

## 2019-07-23 PROCEDURE — 99239 HOSP IP/OBS DSCHRG MGMT >30: CPT | Performed by: PSYCHIATRY & NEUROLOGY

## 2019-07-23 PROCEDURE — 99232 SBSQ HOSP IP/OBS MODERATE 35: CPT | Performed by: FAMILY MEDICINE

## 2019-07-23 RX ORDER — TRAZODONE HYDROCHLORIDE 50 MG/1
25 TABLET ORAL
Status: DISCONTINUED | OUTPATIENT
Start: 2019-07-23 | End: 2020-01-01 | Stop reason: HOSPADM

## 2019-07-23 RX ORDER — FLUTICASONE FUROATE AND VILANTEROL 200; 25 UG/1; UG/1
1 POWDER RESPIRATORY (INHALATION) DAILY
Status: DISCONTINUED | OUTPATIENT
Start: 2019-07-24 | End: 2020-01-01 | Stop reason: HOSPADM

## 2019-07-23 RX ORDER — LEVOTHYROXINE SODIUM 0.12 MG/1
125 TABLET ORAL
Status: DISCONTINUED | OUTPATIENT
Start: 2019-07-24 | End: 2020-01-01 | Stop reason: CLARIF

## 2019-07-23 RX ORDER — FLUOXETINE 10 MG/1
10 CAPSULE ORAL DAILY
Status: DISCONTINUED | OUTPATIENT
Start: 2019-07-24 | End: 2019-09-18

## 2019-07-23 RX ORDER — BENZONATATE 100 MG/1
100 CAPSULE ORAL 3 TIMES DAILY PRN
Status: DISCONTINUED | OUTPATIENT
Start: 2019-07-23 | End: 2020-01-01 | Stop reason: HOSPADM

## 2019-07-23 RX ORDER — PANTOPRAZOLE SODIUM 40 MG/1
40 TABLET, DELAYED RELEASE ORAL
Status: DISCONTINUED | OUTPATIENT
Start: 2019-07-24 | End: 2020-01-01 | Stop reason: HOSPADM

## 2019-07-23 RX ORDER — PANTOPRAZOLE SODIUM 40 MG/1
40 TABLET, DELAYED RELEASE ORAL
Qty: 30 TABLET | Refills: 0 | Status: ON HOLD | OUTPATIENT
Start: 2019-07-24 | End: 2020-01-01

## 2019-07-23 RX ORDER — CLOZAPINE 25 MG/1
75 TABLET ORAL
Qty: 90 TABLET | Refills: 0 | Status: SHIPPED | OUTPATIENT
Start: 2019-07-23 | End: 2020-01-01 | Stop reason: HOSPADM

## 2019-07-23 RX ORDER — OLANZAPINE 5 MG/1
5 TABLET ORAL EVERY 8 HOURS PRN
Status: DISCONTINUED | OUTPATIENT
Start: 2019-07-23 | End: 2020-01-01 | Stop reason: HOSPADM

## 2019-07-23 RX ORDER — ALBUTEROL SULFATE 90 UG/1
2 AEROSOL, METERED RESPIRATORY (INHALATION) EVERY 4 HOURS PRN
Status: DISCONTINUED | OUTPATIENT
Start: 2019-07-23 | End: 2020-01-01 | Stop reason: HOSPADM

## 2019-07-23 RX ORDER — ACETAMINOPHEN 325 MG/1
650 TABLET ORAL EVERY 6 HOURS PRN
Status: DISCONTINUED | OUTPATIENT
Start: 2019-07-23 | End: 2019-08-21

## 2019-07-23 RX ORDER — LEVOTHYROXINE SODIUM 0.12 MG/1
125 TABLET ORAL
Qty: 30 TABLET | Refills: 0 | Status: ON HOLD | OUTPATIENT
Start: 2019-07-24 | End: 2020-01-01

## 2019-07-23 RX ORDER — CLOZAPINE 25 MG/1
75 TABLET ORAL
Status: DISCONTINUED | OUTPATIENT
Start: 2019-07-23 | End: 2019-07-25

## 2019-07-23 RX ORDER — ACETAMINOPHEN 325 MG/1
650 TABLET ORAL EVERY 6 HOURS PRN
Status: DISCONTINUED | OUTPATIENT
Start: 2019-07-23 | End: 2020-01-01 | Stop reason: HOSPADM

## 2019-07-23 RX ORDER — FLUOXETINE 10 MG/1
10 CAPSULE ORAL DAILY
Qty: 30 CAPSULE | Refills: 0 | Status: ON HOLD | OUTPATIENT
Start: 2019-07-24 | End: 2020-01-01

## 2019-07-23 RX ADMIN — PANTOPRAZOLE SODIUM 40 MG: 40 TABLET, DELAYED RELEASE ORAL at 05:32

## 2019-07-23 RX ADMIN — FLUTICASONE FUROATE AND VILANTEROL TRIFENATATE 1 PUFF: 200; 25 POWDER RESPIRATORY (INHALATION) at 08:55

## 2019-07-23 RX ADMIN — THEOPHYLLINE ANHYDROUS 200 MG: 200 CAPSULE, EXTENDED RELEASE ORAL at 08:52

## 2019-07-23 RX ADMIN — CLOZAPINE 75 MG: 25 TABLET ORAL at 20:57

## 2019-07-23 RX ADMIN — LEVOTHYROXINE SODIUM 125 MCG: 125 TABLET ORAL at 05:32

## 2019-07-23 RX ADMIN — FLUOXETINE 10 MG: 10 CAPSULE ORAL at 08:52

## 2019-07-23 NOTE — PROGRESS NOTES
Pt constricted and somatic but redirectable  Med-compliant  Good appetite and steady gait  VSS  Pox, 97% RA  Report given to Deni Maxwell, for transfer/discharge at 1600  Monitored for safety and support

## 2019-07-23 NOTE — CASE MANAGEMENT
Patient is accepted at [place] [address]  Patient is accepted by Dr Pushpa Flores    Patient may go to the floor at 4:00pm       Nurse report is to be called to [facility], [address] [place] [address], after 10:30am prior to patient transfer at 4:00pm     Insurance Authorization for admission:   Phone call placed to Jossie at [employer]  Phone number: [phone] [id number]  Authorization # precert completed on [id number]  Copy of 305 given to Brenda Barajas  Copies also on chart  Sister contacted with patient's permission to make her aware of transfer  Writer given confirmation by sister that patient returning home with family is not an option at all, even after completing EAC  [facility] notified of transfer for today as well  CM will continue to follow and provide services as needed

## 2019-07-23 NOTE — DISCHARGE SUMMARY
Discharge Summary - Aspen Crawford [age] y o  female MRN: [id number]  Unit/Bed#: [facility] Bat[facility] 274-03 Encounter: [id number]     Admission Date: 5/5/2019         Discharge Date: 7/23/2019    Attending Psychiatrist: Genesis Guerrero MD    Reason for Admission/HPI:     Rah Abarca is a 66year old female with a long history of severe and persistent mental illness, multiple past psychiatric hospitalizations, and chronically low functioning capability  The patient presented initially to the medical floor at [address] [place] [address] where she was being treated for sepsis and pneumonia  While on the medical unit, the patient presented as illogical, paranoid, and delusional  The patient was selective with medical treatment, irritable, and refusing currently ordered medications  The patient was refusing to shower and paranoid about her food and medications  The staff reported that she needed frequent limit setting and was consistently somatic with healthcare concerns, despite refusing treatment  Despite the patient's long history of mental illness, she has never been consistent with long term treatment and consistently remains noncompliant with her medications  The patient was eventually admitted to the older adult psychiatric unit on a 302 due to paranoia and poor self care  After persistent treatment on the psychiatric floor, which consisted of involuntary medication treatment and multiple medication trials, the patient will be transferred to long term care at [facility] for long term treatment       Meds/Allergies     all current active meds have been reviewed    Allergies   Allergen Reactions    Peanut-Containing Drug Products Anaphylaxis    Shellfish-Derived Products Anaphylaxis    Antihistamines, Chlorpheniramine-Type     Aspirin     Atrovent [Ipratropium]     Elavil [Amitriptyline]     Iodine      Other reaction(s): Unknown Reaction    Levaquin [Levofloxacin]     Novocain [Procaine]     Penicillins      Able to tolerate azithromycin and vancomycin    Prolixin [Fluphenazine]     Sulfa Antibiotics Other (See Comments)     Unknown Zithromax-Tight Throat       Objective     Vital signs in last 24 hours:  Temp:  [97 8 °F (36 6 °C)-98 5 °F (36 9 °C)] 97 8 °F (36 6 °C)  HR:  [74-77] 76  Resp:  [16-17] 17  BP: (105-111)/(55-62) 110/55      Intake/Output Summary (Last 24 hours) at 7/23/2019 1253  Last data filed at 7/23/2019 1210  Gross per 24 hour   Intake 1140 ml   Output --   Net 1140 ml       Hospital Course: The patient was admitted to the inpatient psychiatric unit and started on every 15 minutes precautions  During the hospitalization the patient was attending individual therapy, group therapy, milieu therapy and occupational therapy  Psychiatric medications were titrated over the hospital stay  To address depression, irritability, psychotic symptoms, paranoid ideation, delusional thoughts, anxiety symptoms and obsessive thoughts the patient was started on antidepressant Prozac and antipsychotic medication Clozaril  Medication doses were titrated during the hospital course  Prior to beginning of treatment medications risks and benefits and possible side effects including risk of suicidality and serotonin syndrome related to treatment with antidepressants and risks of agranulocytosis related to treatment with Clozaril were reviewed with the patient  The patient verbalized understanding and agreement for treatment  Patient's symptoms were consistent and failed to improve over the hospital course  Mood was stable at the time of discharge  The patient denied suicidal ideation, intent or plan at the time of discharge and denied homicidal ideation, intent or plan at the time of discharge  However, overt psychosis continued to be apparent at the time of discharge  Sleep and appetite were improved   The patient was tolerating medications and was not reporting any significant side effects at the time of discharge  Since she continued to have significant psychiatric symptoms, treatment team felt that the patient required long term psychiatric treatment  The patient will be transferred to [facility] for long term care          Mental Status at Time of Discharge:   Appearance:  Marginal/poor hygiene   Behavior:  guarded, Superficial and Dismissive   Speech:   Language: Normal rate and Normal volume  No overt abnormality   Mood:  irritable   Affect:   Associations: appropriate  intact   Thought Process:  Circumstantial   Thought Content:  Paranoid and mistrustful, Delusions of persecution, Grandiose delusions, Obsessive ruminations and is somatically preoccupied   Perceptual Disturbances: Denies hallucinations and does not appear to be responding to internal stimuli     Risk Potential: No suicidal or homicidal ideation   Orientation   Language Oriented x 3  anomia No   Memory  Fund of knowledge No deficits  aware of current events and Aware of past history   Attention/Concentration attention span appeared shorter than expected for age   Insight:  No insight   Judgment: Poor judgment   Gait/Station: normal gait/station and normal balance   Motor Activity: No abnormal movement noted       Admission Diagnosis:  Principal Problem:    Schizoaffective disorder, bipolar type ([facility] [place] [facility] )  Active Problems:    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[facility] )    Acquired hypothyroidism    Gastroesophageal reflux disease without esophagitis    Allergic conjunctivitis of right eye      Discharge Diagnosis:     Principal Problem:    Schizoaffective disorder, bipolar type ([facility] )  Active Problems:    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[facility] )    Acquired hypothyroidism    Gastroesophageal reflux disease without esophagitis    Allergic conjunctivitis of right eye  Resolved Problems:    Pneumonia of right upper lobe due to infectious organism [facility])      Lab results:    Admission on 05/05/2019   Component Date Value    POC Glucose 05/10/2019 156*    Clarity, UA 05/29/2019 Clear     Color, UA 05/29/2019 Straw     Specific [place], UA 05/29/2019 1 015     pH, UA 05/29/2019 8 0     Glucose, UA 05/29/2019 Negative     Ketones, UA 05/29/2019 Negative     Blood, UA 05/29/2019 Negative     Protein, UA 05/29/2019 Negative     Nitrite, UA 05/29/2019 Negative     Bilirubin, UA 05/29/2019 Negative     Leukocytes, UA 05/29/2019 Negative     WBC, UA 05/29/2019 0-1*    RBC, UA 05/29/2019 0-1*    Bacteria, UA 05/29/2019 Occasional     Epithelial Cells 05/29/2019 Occasional     UROBILINOGEN UA 05/29/2019 Negative     WBC 05/31/2019 7 10     RBC 05/31/2019 4 16     Hemoglobin 05/31/2019 12 8     Hematocrit 05/31/2019 39 0     MCV 05/31/2019 94     MCH 05/31/2019 30 8     MCHC 05/31/2019 32 8     RDW 05/31/2019 13 5     MPV 05/31/2019 9 9     Platelets 97/72/1586 239     Sodium 05/31/2019 138     Potassium 05/31/2019 3 8     Chloride 05/31/2019 102     CO2 05/31/2019 29     ANION GAP 05/31/2019 7     BUN 05/31/2019 11     Creatinine 05/31/2019 0 59*    Glucose 05/31/2019 84     Glucose, Fasting 05/31/2019 84     Calcium 05/31/2019 9 5     AST 05/31/2019 38*    ALT 05/31/2019 40     Alkaline Phosphatase 05/31/2019 87     Total Protein 05/31/2019 6 6     Albumin 05/31/2019 3 5     Total Bilirubin 05/31/2019 0 60     eGFR 05/31/2019 99     Segmented % 05/31/2019 27*    Bands % 05/31/2019 1     Lymphocytes % 05/31/2019 53*    Monocytes % 05/31/2019 12*    Eosinophils, % 05/31/2019 3     Basophils % 05/31/2019 1     Atypical Lymphocytes % 05/31/2019 3*    Neutrophils Absolute 05/31/2019 1 99     Lymphocytes Absolute 05/31/2019 3 76     Monocytes Absolute 05/31/2019 0 85     Eosinophils Absolute 05/31/2019 0 21     Basophils Absolute 05/31/2019 0 07     Total Counted 05/31/2019 100     RBC Morphology 05/31/2019 Normal     Platelet Estimate 21/44/6953 Adequate     Ventricular Rate 06/03/2019 89     Atrial Rate 06/03/2019 89     [place] Interval 06/03/2019 150     QRSD Interval 06/03/2019 90     QT Interval 06/03/2019 384     QTC Interval 06/03/2019 467     P Axis 06/03/2019 -17     QRS [place] 06/03/2019 125     T Wave Axis 06/03/2019 -1     Total Bilirubin 06/14/2019 0 30     Bilirubin, Direct 06/14/2019 0 10     Alkaline Phosphatase 06/14/2019 82     AST 06/14/2019 23     ALT 06/14/2019 36     Total Protein 06/14/2019 6 3     Albumin 06/14/2019 3 3     WBC 06/14/2019 6 50     RBC 06/14/2019 4 09     Hemoglobin 06/14/2019 12 3     Hematocrit 06/14/2019 38 2     MCV 06/14/2019 94     MCH 06/14/2019 30 0     MCHC 06/14/2019 32 1     RDW 06/14/2019 13 6     MPV 06/14/2019 9 6     Platelets 03/76/3361 210     Neutrophils Relative 06/14/2019 43*    Lymphocytes Relative 06/14/2019 42     Monocytes Relative 06/14/2019 11*    Eosinophils Relative 06/14/2019 3     Basophils Relative 06/14/2019 1     Neutrophils Absolute 06/14/2019 2 80     Lymphocytes Absolute 06/14/2019 2 80     Monocytes Absolute 06/14/2019 0 70     Eosinophils Absolute 06/14/2019 0 20     Basophils Absolute 06/14/2019 0 00     POC Glucose 06/16/2019 86     POC Glucose 06/18/2019 90     POC Glucose 06/25/2019 84     WBC 07/12/2019 7 30     RBC 07/12/2019 4 24     Hemoglobin 07/12/2019 12 7     Hematocrit 07/12/2019 38 6     MCV 07/12/2019 91     MCH 07/12/2019 30 1     MCHC 07/12/2019 33 0     RDW 07/12/2019 14 1     MPV 07/12/2019 9 3     Platelets 14/82/7646 191     Neutrophils Relative 07/12/2019 40*    Lymphocytes Relative 07/12/2019 47*    Monocytes Relative 07/12/2019 10     Eosinophils Relative 07/12/2019 2     Basophils Relative 07/12/2019 1     Neutrophils Absolute 07/12/2019 3 00     Lymphocytes Absolute 07/12/2019 3 40     Monocytes Absolute 07/12/2019 0 70     Eosinophils Absolute 07/12/2019 0 20     Basophils Absolute 07/12/2019 0 10     Ventricular Rate 07/17/2019 82     Atrial Rate 07/17/2019 82     [place] Interval 07/17/2019 154     QRSD Interval 07/17/2019 86     QT Interval 07/17/2019 412     QTC Interval 07/17/2019 481     P Axis 07/17/2019 78     QRS Axis 07/17/2019 -63     T Wave Axis 07/17/2019 59     WBC 07/19/2019 7 10     RBC 07/19/2019 4 22     Hemoglobin 07/19/2019 12 7     Hematocrit 07/19/2019 38 9     MCV 07/19/2019 92     MCH 07/19/2019 30 1     MCHC 07/19/2019 32 6     RDW 07/19/2019 14 5     MPV 07/19/2019 9 6     Platelets 96/60/7851 211     Neutrophils Relative 07/19/2019 38*    Lymphocytes Relative 07/19/2019 49*    Monocytes Relative 07/19/2019 10     Eosinophils Relative 07/19/2019 3     Basophils Relative 07/19/2019 1     Neutrophils Absolute 07/19/2019 2 70     Lymphocytes Absolute 07/19/2019 3 50     Monocytes Absolute 07/19/2019 0 70     Eosinophils Absolute 07/19/2019 0 20     Basophils Absolute 07/19/2019 0 00        Discharge Medications:    See after visit summary for reconciled discharge medications provided to patient and family  Discharge instructions/Information to patient and family:     See after visit summary for information provided to patient and family  Provisions for Follow-Up Care:    See after visit summary for information related to follow-up care and any pertinent home health orders  Discharge Statement     I spent 35 minutes discharging the patient  This time was spent on the day of discharge  I had direct contact with the patient on the day of discharge  Additional documentation is required if more than 30 minutes were spent on discharge:    I reviewed with Sara Arnulfo importance of compliance with medications and outpatient treatment after discharge to long term care  I discussed the medication regimen and possible side effects of the medications with Portagemerissa Arredondo prior to discharge to long term care  At the time of discharge she was tolerating psychiatric medications    Sara Arredondo was transferred to an Extended Acute Care unit for a long term inpatient psychiatric treatment

## 2019-07-23 NOTE — PROGRESS NOTES
Patient transferred to [facility]  Patient belongings, medications sent  Transported via wheel chair with security, tech, and

## 2019-07-23 NOTE — PROGRESS NOTES
[facility] NOTE     07/22/19 1500   Activity/Group Checklist   Group Exercise   Attendance Attended   Attendance Duration (min) 16-30   Interactions Did not interact   Affect/Mood Calm  (Sat and primarily observed )   Objectives/Subjects Covered  Energy Conservation Techniques  Safe Stretching  Fall Prevention  Gentle Movement for Coping with Anxiety/Depression

## 2019-07-23 NOTE — PROGRESS NOTES
Currently observed in bed with O2 at 1 L via nasal cannula, PO 98%   Appears to be resting calmly, eyes closed, in no distress  Not voicing any SI's or complaints  Monitored on safety checks  Is scheduled for transfer to [facility] today

## 2019-07-23 NOTE — PROGRESS NOTES
Progress Note    Bailee Stage 60 y o  female MRN: [id number]  Unit/Bed#: St[facility] Holly 254-56 Encounter: [id number]  Admitting Physician: Lucie High MD  PCP: Dona Lambert MD  Date of Admission:  5/5/2019  7:11 PM    Assessment and Plan    * Schizoaffective disorder, bipolar type [facility])  Assessment & Plan  - Management per psychiatry     COPD with asthma [facility])  Assessment & Plan  - Longstanding history of COPD with asthma   - Patient is currently being weaned off oxygen during the day by respiratory therapy and is able to maintain adequate oxygen saturations on room air   - Patient states that she uses 2L NC at night   - Continue Breo Ellipta and Theophylline daily with albuterol inhalers as needed for wheezing  Acquired hypothyroidism  Assessment & Plan  - Continue levothyroxine 125 mcg daily     Gastroesophageal reflux disease without esophagitis  Assessment & Plan  - Stable   - Continue protonix 40 mg daily with mylanta PRN     Allergic conjunctivitis of right eye  Assessment & Plan  - Continue antihistamine eye drops as needed         VTE Pharmacologic Prophylaxis:   Pharmacologic: VTE low risk  Mechanical VTE Prophylaxis in Place: No, Pt is ambulatory    Patient Centered Rounds: I have performed bedside rounds with nursing staff today  Discussions with Specialists or Other Care Team Provider: N/A    Education and Discussions with Family / Patient: Yes    Time Spent for Care: 20 minutes  More than 50% of total time spent on counseling and coordination of care as described above  Current Length of Stay: 78 day(s)    Current Patient Status: Inpatient Psych   Certification Statement: The patient will continue to require additional inpatient hospital stay due to Schizoaffective Disorder, bipolar type    Discharge Plan: Unknown at this time    Code Status: Level 1 - Full Code      Subjective:   Pt evaluated at bedside   She is doing well and has some concern with tightness in chest she experienced on   Has resolved at this time  Sating appropriately on RA  Objective:     Vitals:   Temp (24hrs), Av 1 °F (36 7 °C), Min:97 8 °F (36 6 °C), Max:98 5 °F (36 9 °C)    Temp:  [97 8 °F (36 6 °C)-98 5 °F (36 9 °C)] 97 8 °F (36 6 °C)  HR:  [74-77] 76  Resp:  [16-17] 17  BP: (105-111)/(55-62) 110/55  SpO2:  [91 %-97 %] 97 %  Body mass index is 25 16 kg/m²  Input and Output Summary (last 24 hours): Intake/Output Summary (Last 24 hours) at 2019 1352  Last data filed at 2019 1210  Gross per 24 hour   Intake 1140 ml   Output --   Net 1140 ml       Physical Exam:     Physical Exam   Constitutional: She is oriented to person, place, and time  She appears well-developed and well-nourished  No distress  Eyes: Conjunctivae and EOM are normal  No scleral icterus  Neck: Normal range of motion  Neck supple  Cardiovascular: Normal rate and normal heart sounds  Exam reveals no friction rub  No murmur heard  Pulmonary/Chest: Effort normal and breath sounds normal  No respiratory distress  She has no wheezes  Abdominal: Soft  There is no tenderness  Musculoskeletal: Normal range of motion  She exhibits no edema  Neurological: She is alert and oriented to person, place, and time  Skin: Skin is warm  No rash noted  Psychiatric: She has a normal mood and affect  Additional Data:     Labs:    Results from last 7 days   Lab Units 19  0455   WBC Thousand/uL 7 10   HEMOGLOBIN g/dL 12 7   HEMATOCRIT % 38 9   PLATELETS Thousands/uL 211   NEUTROS PCT % 38*   LYMPHS PCT % 49*   MONOS PCT % 10   EOS PCT % 3           Invalid input(s): LABALBU                  * I Have Reviewed All Lab Data Listed Above    * Additional Pertinent Lab Tests Reviewed: No new labs    Imaging:    Imaging Reports Reviewed Today Include: No new imaging  Imaging Personally Reviewed by Myself Includes:  No new imaging    Recent Cultures (last 7 days):           Last 24 Hours Medication List:     Current Facility-Administered Medications:  acetaminophen 650 mg Oral Q6H PRN Nate Crowley MD   albuterol 2 puff Inhalation Q4H PRN ABILIO Pizarro   aluminum-magnesium hydroxide-simethicone 15 mL Oral Q4H PRN Lynn Martinez MD   ammonium lactate  Topical BID PRN ABILIO Pizarro   benzonatate 100 mg Oral TID PRN Nate Crowley MD   benztropine 1 mg Intramuscular Q8H PRN Tari Ge MD   cloZAPine 75 mg Oral HS Tari Ge MD   FLUoxetine 10 mg Oral Daily ABILIO Pizarro   fluticasone-vilanterol 1 puff Inhalation Daily Osvaldo Jefferson MD   haloperidol lactate 5 mg Intramuscular Daily PRN Tari Ge MD   ketotifen 1 drop Right Eye BID PRN Memo Cabral MD   levothyroxine 125 mcg Oral Early Morning Nate Crowley MD   magnesium hydroxide 30 mL Oral Daily PRN Kely Erickson MD   OLANZapine 5 mg Intramuscular Q8H PRN Tari Ge MD   pantoprazole 40 mg Oral Early Morning Nate Crowley MD   polyethylene glycol 17 g Oral Daily PRN ABILIO Pizarro   polyvinyl alcohol 1 drop Both Eyes Q3H PRN Merry Rivera PA-C   theophylline 200 mg Oral Daily Nate Crowley MD   traZODone 25 mg Oral HS PRN Nate Crowley MD        ** Please Note: Dictation voice to text software may have been used in the creation of this document   Seven Goodwin MD  07/23/19  1:52 PM

## 2019-07-23 NOTE — PROGRESS NOTES
07/23/19 0837   Team Meeting   Meeting Type Daily Rounds   Team Members Present   Team Members Present Physician;Nurse;   Physician Team Member Dr Lopez Jalloh Team Member [id number] Management Team Member Mena Mann      Pt discussed at treatment rounds today, pt schedule to be transferred to [facility] at 4 pm today

## 2019-07-23 NOTE — PROGRESS NOTES
Med compliant this AM  Denies SI's  States she slept well  Gave me a hug and said thank you for everything, aware she is being transferred to [facility] today

## 2019-07-23 NOTE — NURSING NOTE
Patient transferred to [facility]  Patient belongings, medications sent  Transported via wheel chair with security, tech, and

## 2019-07-24 LAB
ALBUMIN SERPL BCP-MCNC: 4.1 G/DL (ref 3–5.2)
ALP SERPL-CCNC: 123 U/L (ref 43–122)
ALT SERPL W P-5'-P-CCNC: 17 U/L (ref 9–52)
ANION GAP SERPL CALCULATED.3IONS-SCNC: 10 MMOL/L (ref 5–14)
AST SERPL W P-5'-P-CCNC: 20 U/L (ref 14–36)
BILIRUB SERPL-MCNC: 0.5 MG/DL
BUN SERPL-MCNC: 14 MG/DL (ref 5–25)
CALCIUM SERPL-MCNC: 9.8 MG/DL (ref 8.4–10.2)
CHLORIDE SERPL-SCNC: 102 MMOL/L (ref 97–108)
CHOLEST SERPL-MCNC: 261 MG/DL
CO2 SERPL-SCNC: 29 MMOL/L (ref 22–30)
CREAT SERPL-MCNC: 0.79 MG/DL (ref 0.6–1.2)
GFR SERPL CREATININE-BSD FRML MDRD: 80 ML/MIN/1.73SQ M
GLUCOSE SERPL-MCNC: 163 MG/DL (ref 70–99)
GLUCOSE SERPL-MCNC: 95 MG/DL (ref 65–140)
HDLC SERPL-MCNC: 52 MG/DL (ref 40–59)
LDLC SERPL CALC-MCNC: 178 MG/DL
NONHDLC SERPL-MCNC: 209 MG/DL
POTASSIUM SERPL-SCNC: 4.1 MMOL/L (ref 3.6–5)
PROT SERPL-MCNC: 7.5 G/DL (ref 5.9–8.4)
SODIUM SERPL-SCNC: 141 MMOL/L (ref 137–147)
TRIGL SERPL-MCNC: 154 MG/DL
TSH SERPL DL<=0.05 MIU/L-ACNC: 1.25 UIU/ML (ref 0.47–4.68)

## 2019-07-24 PROCEDURE — 82948 REAGENT STRIP/BLOOD GLUCOSE: CPT

## 2019-07-24 PROCEDURE — 80061 LIPID PANEL: CPT | Performed by: PSYCHIATRY & NEUROLOGY

## 2019-07-24 PROCEDURE — 84443 ASSAY THYROID STIM HORMONE: CPT | Performed by: PSYCHIATRY & NEUROLOGY

## 2019-07-24 PROCEDURE — 99222 1ST HOSP IP/OBS MODERATE 55: CPT | Performed by: PSYCHIATRY & NEUROLOGY

## 2019-07-24 PROCEDURE — 80053 COMPREHEN METABOLIC PANEL: CPT | Performed by: PSYCHIATRY & NEUROLOGY

## 2019-07-24 RX ADMIN — THEOPHYLLINE ANHYDROUS 200 MG: 200 CAPSULE, EXTENDED RELEASE ORAL at 08:52

## 2019-07-24 RX ADMIN — CLOZAPINE 75 MG: 25 TABLET ORAL at 20:45

## 2019-07-24 RX ADMIN — FLUOXETINE 10 MG: 10 CAPSULE ORAL at 08:52

## 2019-07-24 RX ADMIN — LEVOTHYROXINE SODIUM 125 MCG: 125 TABLET ORAL at 05:34

## 2019-07-24 RX ADMIN — PANTOPRAZOLE SODIUM 40 MG: 40 TABLET, DELAYED RELEASE ORAL at 05:34

## 2019-07-24 RX ADMIN — FLUTICASONE FUROATE AND VILANTEROL TRIFENATATE 1 PUFF: 200; 25 POWDER RESPIRATORY (INHALATION) at 08:52

## 2019-07-24 NOTE — PLAN OF CARE
Problem: PAIN - ADULT  Goal: Verbalizes/displays adequate comfort level or baseline comfort level  Description  Interventions:  - Encourage patient to monitor pain and request assistance  - Assess pain using appropriate pain scale  - Administer analgesics based on type and severity of pain and evaluate response  - Implement non-pharmacological measures as appropriate and evaluate response  - Consider cultural and social influences on pain and pain management  - Notify physician/advanced practitioner if interventions unsuccessful or patient reports new pain  Outcome: Progressing     Problem: SAFETY ADULT  Goal: Patient will remain free of falls  Description  INTERVENTIONS:  - Assess patient frequently for physical needs  -  Identify cognitive and physical deficits and behaviors that affect risk of falls  -  [place] fall precautions as indicated by assessment   - Educate patient/family on patient safety including physical limitations  - Instruct patient to call for assistance with activity based on assessment  - Modify environment to reduce risk of injury  - Consider OT/PT consult to assist with strengthening/mobility  Outcome: Progressing    ~Maggie maintained on ongoing aspiration, fall and SAFE precaution without incident on this shift  She is a new admit transfer from   She is currently laying in bed with yes closed, breath even and unlabored, with 02 @1l/m via nasal cannula for COPD  SPO2 @97% on 1L/m  No respiratory distress noted  Chanda Rod will continue to remain SAFE and free from fall as her room is across from nurse's station and is visible seen by staff due to medical equipments, L4 Fx and hx is fall  Q 15 minutes checks continues during rounds  No indication of pain or discomfort noted    Will continue to monitor sleep and behavioral pattern   Dean has a scheduled lab to be obtain in the morning, TSH, Lipid Panel and CMP

## 2019-07-24 NOTE — PROGRESS NOTES
Individual refused bloodwork this morning and stated that "I rather wait for couple of days , I just had bloodwork done yesterday"

## 2019-07-24 NOTE — PROGRESS NOTES
07/24/19 0900   Team Meeting   Meeting Type Daily Rounds   Team Members Present   Team Members Present Physician;Nurse;; Other (Discipline and Name)   Physician Team Member Dr Chanell Martell, RN   Care Management Team Member Brenda Oliveros   Other (Discipline and Name) Estefania Miller, Count includes the [facility] admission to the unit  Adjusting slowly  No issues or concerns made known at this time

## 2019-07-24 NOTE — PLAN OF CARE
Problem: Alteration in Thoughts and Perception  Goal: Verbalize thoughts and feelings  Description  Interventions:  - Promote a nonjudgmental and trusting relationship with the patient through active listening and therapeutic communication  - Assess patient's level of functioning, behavior and potential for risk  - Engage patient in 1 on 1 interactions for a minimum of 15 minutes each session  - Encourage patient to express fears, feelings, frustrations, and discuss symptoms    - [place] patient to reality, help patient recognize reality-based thinking   - Administer medications as ordered and assess for potential side effects  - Provide the patient education related to the signs and symptoms of the illness and desired effects of prescribed medications  Outcome: Progressing  Goal: Agree to be compliant with medication regime, as prescribed and report medication side effects  Description  Interventions:  - Offer appropriate PRN medication and supervise ingestion; conduct aims, as needed   Outcome: Progressing   Patient is pleasant, cooperative and visible on the unit  She seems to be adjusting to the unit well  Wedge placed under her mattress to elevate her head to make breathing easier for her when she is in bed  No behavioral issues noted  Continue to monitor

## 2019-07-24 NOTE — PLAN OF CARE
Problem: Alteration in Thoughts and Perception  Goal: Verbalize thoughts and feelings  Description  Interventions:  - Promote a nonjudgmental and trusting relationship with the patient through active listening and therapeutic communication  - Assess patient's level of functioning, behavior and potential for risk  - Engage patient in 1 on 1 interactions for a minimum of 15 minutes each session  - Encourage patient to express fears, feelings, frustrations, and discuss symptoms    - [place] patient to reality, help patient recognize reality-based thinking   - Administer medications as ordered and assess for potential side effects  - Provide the patient education related to the signs and symptoms of the illness and desired effects of prescribed medications  Outcome: Progressing  Goal: Agree to be compliant with medication regime, as prescribed and report medication side effects  Description  Interventions:  - Offer appropriate PRN medication and supervise ingestion; conduct aims, as needed   Outcome: Progressing  Goal: Attend and participate in unit activities, including therapeutic, recreational, and educational groups  Description  Interventions:  - Provide therapeutic and educational activities daily, encourage attendance and participation, and document same in the medical record   Outcome: Progressing     Problem: Depression  Goal: Refrain from isolation  Description  Interventions:  - Develop a trusting relationship   - Encourage socialization   Outcome: Progressing     Problem: Anxiety  Goal: Anxiety is at manageable level  Description  Interventions:  - Assess and monitor patient's anxiety level  - Monitor for signs and symptoms of anxiety both physical and emotional (heart palpitations, chest pain, shortness of breath, headaches, nausea, feeling jumpy, restlessness, irritable, apprehensive)  - Collaborate with interdisciplinary team and initiate plan and interventions as ordered    - [place] patient to unit/surroundings  - Explain treatment plan  - Encourage participation in care  - Encourage verbalization of concerns/fears  - Identify coping mechanisms  - Assist in developing anxiety-reducing skills  - Administer/offer alternative therapies  - Limit or eliminate stimulants  Outcome: Progressing     Problem: Alteration in Orientation  Goal: Interact with staff daily  Description  Interventions:  - Assess and re-assess patient's level of orientation  - Engage patient in 1 on 1 interactions, daily, for a minimum of 15 minutes   - Establish rapport/trust with patient   Outcome: Progressing     Problem: SAFETY ADULT  Goal: Patient will remain free of falls  Description  INTERVENTIONS:  - Assess patient frequently for physical needs  -  Identify cognitive and physical deficits and behaviors that affect risk of falls  -  [place] fall precautions as indicated by assessment   - Educate patient/family on patient safety including physical limitations  - Instruct patient to call for assistance with activity based on assessment  - Modify environment to reduce risk of injury  - Consider OT/PT consult to assist with strengthening/mobility  Outcome: Isra Chaney is adjusting to unit  Pt did not attend evening group but came out for snack after  Pt continuously verbalizing concerns about having her oxygen concentrator for tonight at bedtime and needs much reassurance from staff that it will be available  Noted socializing with peers during snack time  Compliant with scheduled meds  Gait steady  Pt resting quietly in bed at present, arouses easily and has O2 via nasal cannula and oxygen concentrator on at 1 litre  Respirations easy and unlabored  Will continue to monitor/assess for any changes

## 2019-07-24 NOTE — H&P
Initial Psychiatric Evaluation    Medical Record Number: [id number]  Encounter: [id number]      History:     Grecia Dooley is an 60 y o , female,  for 3 years  for 25 years with a 77-year-old son, on SSI benefits for about years, with high school equivalency diploma and unemployed since [address] [place] [address], living at the [address] [place] [address] for about 6 weeks and admitted to inpatient psychiatric unit on from May 5, 2019 initially as transfer from medical floor where she was treated for pneumonia  She was admitted to the extended acute care psychiatric unit at [facility] under 305 involuntary status from [facility] on 07/23/2019 as transfer from the older adult inpatient psychiatric unit at [facility] in [facility]  Relevant history  Patient is a poor informant and records indicate that she was at the older adult inpatient psychiatric unit at [facility] for about 3 months prior to being placed at the [address] [place] [address] and was supposed to be followed up by [facility] act team   Apparently she was refusing all medications including lithium and accused the staff of poisoning her medications with peanut oil and was claiming that she could not breathe and was unable to be managed in the community in the personal care home  She thought that 1 particular staff was trying to kill her and injecting things into her ears and claim that there was a electronic bugs implanted in her arm  When she was on the medical floor after being treated for pneumonia she refused to go back to [facility] as she became paranoid and hence the commitment to the older adult inpatient psychiatric unit  She was tried on various medications without relief as she became very paranoid and delusional and claimed that she did not feel safe and thought that people were out to harm her    She felt that people were tracking her and following her and wanting to kill her and she was not showing much improvement  She was on Khloe Radon which did not work according to her  She was tried on Haldol, lithium, Klonopin, Prozac, Prolixin, Risperdal etc and she claims that she was recently placed on the clozapine which she is now taking as 75 mg at bedtime with Prozac 10 mg a day which she claims has helped her to some extent  It was felt that she needed further long-term stabilization and hence the current transfer to the extended acute care unit  She insists that she was not in seclusion or restraints know why she put on suicide precautions when she was on the order of Older Adult Unit  She did not admit to any bouts of depression or dylan or other psychotic symptoms unless until I confronted her with the information from the chart and she was insisting that there were all false information  Past psychiatric history  Patient reports that she was in reform school when she was a teenager and was sent to [facility] when she was 17years old for about a month and a doctor took her off all medications and sent her back  Then she recalls being in [facility] as an adult at age 26 and at [facility] in [facility] and in [address] in [address] and she was actually at [facility] on a few occasions  She is very vague in giving details  She believes that she might have threatened suicide when she ended up in [facility] when she was 17years old  She categorically denied having any manic or depressive symptoms but she claims that before she ended up in [facility] she was going through postpartum depression and she recalls being placed on lithium at that time    It appears that she had been diagnosed with schizoaffective bipolar disorder because of possible manic symptoms as well as psychotic symptoms in the past   She appears to have had delusions about having an electronic Amarjit implanted on her left time which she misperceives as she has a lipoma on her left forearm  She also believes that people were trying to implant  and trying to inject things into her ear to kill her and she thinnks that the people who killed her fiance years ago is still out to get her which appears to be an ongoing paranoid theme  She was tried on various medications as stated above  She denies having ever attempted suicide per se  She has had issues with self-care skills and was delusional about not being able to do anything even to walk a few paces without oxygen and was even refusing to go to the shower without oxygen even though she does not need nasal oxygen at all times  She claims that she did not have any hallucinatory experiences and she would not admit to any clear-cut manic episodes even though the referral material indicates that she might have presented with manic symptoms with agitation irritability and mood swings in the past   Past Medical History:   Diagnosis Date    Acid reflux     Anxiety     Asthma     Bipolar 1 disorder ([facility])     Chronic pain disorder     Chronic respiratory failure ([facility])     Compression fracture of fourth lumbar vertebra ([facility])     COPD (chronic obstructive pulmonary disease) ([facility])     Depression     GERD (gastroesophageal reflux disease)     History of home oxygen therapy     Hypothyroidism     Lipoma of upper extremity     Psychiatric illness     Schizoaffective disorder ([facility])     Substance abuse ([facility] )     Nicotine    Thoracic compression fracture ([facility] )     Ventral hernia     She is 5 ft 4 in tall weighs about 146 lb  No seizures or head injuries reported  She claims to be allergic to medications as listed below  She has diagnosis of GERD act  Hypothyroidism lipoma openly extremity nicotine dependence and COPD and possibly asthma  Past surgical history:  No past surgical history on file    She claims she had a lung biopsy done in 2011 and that is when they put her on nasal oxygen  Family history:  Family History   Problem Relation Age of Onset    Arthritis Mother        Current medications:    Current Facility-Administered Medications:     acetaminophen (TYLENOL) tablet 650 mg, 650 mg, Oral, Q6H PRN, Zander Yanez MD    acetaminophen (TYLENOL) tablet 650 mg, 650 mg, Oral, Q6H PRN, Zander Yanez MD    acetaminophen (TYLENOL) tablet 650 mg, 650 mg, Oral, Q6H PRN, Zander Yanez MD    albuterol (PROVENTIL HFA,VENTOLIN HFA) inhaler 2 puff, 2 puff, Inhalation, Q4H PRN, Zander Yanez MD    benzonatate (TESSALON PERLES) capsule 100 mg, 100 mg, Oral, TID PRN, Zander Yanez MD    cloZAPine (CLOZARIL) tablet 75 mg, 75 mg, Oral, HS, Zander Yanez MD, 75 mg at 07/23/19 2057    FLUoxetine (PROzac) capsule 10 mg, 10 mg, Oral, Daily, Zander Yanez MD, 10 mg at 07/24/19 0852    fluticasone-vilanterol (BREO ELLIPTA) 200-25 MCG/INH inhaler 1 puff, 1 puff, Inhalation, Daily, Zander Yanez MD, 1 puff at 07/24/19 0852    levothyroxine tablet 125 mcg, 125 mcg, Oral, Early Morning, Zander Yanez MD, 125 mcg at 07/24/19 0534    OLANZapine (ZyPREXA) tablet 5 mg, 5 mg, Oral, Q8H PRN, Zander Yanez MD    pantoprazole (PROTONIX) EC tablet 40 mg, 40 mg, Oral, Early Morning, Zander Yanez MD, 40 mg at 07/24/19 0534    theophylline (JEF-24) 24 hr capsule 200 mg, 200 mg, Oral, Daily, Zander Yanez MD, 200 mg at 07/24/19 0297    traZODone (DESYREL) tablet 25 mg, 25 mg, Oral, HS PRN, Zander Yanez MD    Psychiatric Review Of Systems:  sleep: good  appetite changes: none  weight changes:none  energy/anergy: good  interest/pleasure/anhedonia: none  somatic symptoms: fear that she cannot do anything without nasal oxygen at times  anxiety/panic:yes  dylan: some hypomania present with slight irritability  guilty/hopeless: denies  self injurious behavior/risky behavior:none    Past Psychiatric History:     Currently in treatment with clozapine and prozac  Past Suicide attempts: none  Past Violent behavior: unknown  Past Psychiatric medication trial: multiple meds like haldol, prolixin, seroquel, invega, lithium  Past Psychiatric Hospitalizations: At least 4 or more as stated above    Allergies:   Allergies   Allergen Reactions    Peanut-Containing Drug Products Anaphylaxis    Shellfish-Derived Products Anaphylaxis    Antihistamines, Chlorpheniramine-Type     Aspirin     Atrovent [Ipratropium]     Elavil [Amitriptyline]     Iodine      Other reaction(s): Unknown Reaction    Levaquin [Levofloxacin]     Novocain [Procaine]     Penicillins      Able to tolerate azithromycin and vancomycin    Prolixin [Fluphenazine]     Sulfa Antibiotics Other (See Comments)     Unknown Zithromax-Tight Throat    Family history of mental illness denied by the patient    Social History:  Social History     Socioeconomic History    Marital status: Single     Spouse name: Not on file    Number of children: Not on file    Years of education: Not on file    Highest education level: Not on file   Occupational History    Not on file   Social Needs    Financial resource strain: Not on file    Food insecurity:     Worry: Not on file     Inability: Not on file    Transportation needs:     Medical: Not on file     Non-medical: Not on file   Tobacco Use    Smoking status: Current Every Day Smoker     Packs/day: 0 20     Types: Cigarettes    Smokeless tobacco: Never Used   Substance and Sexual Activity    Alcohol use: Never     Frequency: Never     Binge frequency: Never    Drug use: No    Sexual activity: Not Currently   Lifestyle    Physical activity:     Days per week: Not on file     Minutes per session: Not on file    Stress: Not on file   Relationships    Social connections:     Talks on phone: Not on file     Gets together: Not on file     Attends Holiness service: Not on file     Active member of club or organization: Not on file     Attends meetings of clubs or organizations: Not on file     Relationship status: Not on file    Intimate partner violence:     Fear of current or ex partner: Not on file     Emotionally abused: Not on file     Physically abused: Not on file     Forced sexual activity: Not on file   Other Topics Concern    Not on file   Social History Narrative    Not on file    She is the oldest out of 5 children with 3 sisters and a brother  She grew up in [facility] father was a  mother was a housewife  The siblings are about 2 years apart  She was a behavior problem when she was a teenager by running away from home not listening and being disruptive in class so that she was sent away to [employer] [facility] [employer] for a year or 2 from were she was committed to [facility] for running away and for threatening suicide when she was 15 according to her and then she was sent back there  She went up to 10th grade in regular classes and did get a high school work wants to diploma afterwards  She claims that she was  for 3 years but he was for 22 years  She has a 75-year-old son  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 and recalls being in [place] about 3 years 1 time  She claims that she was in [address] group home for 3 years 1st time then lived on her own in [place] and then went back to [address] again the 2nd time which did not work out and was then moved to [address] [place] [address] for less than a year when it closed and then she ended up in [facility] and personal care home in [place] from where she was transferred to [address] [place] [address] came home in [facility] before she ended up at the [facility] last early this year  She recalls being  once and then being with another boyfriend for a few years but has been single for many years  No history of incarceration reported      Trauma/ Abuse/ Neglect completely denied with the patient while growing up    Physical Examination:     Vital Signs:  Vitals:    07/23/19 1700 07/23/19 1821 07/23/19 2237 07/24/19 0730   BP: 134/66 134/66  135/69   BP Location: Left arm Left arm  Left arm   Pulse: 78 78  85   Resp: 17 17  17   Temp: 98 6 °F (37 °C) 98 6 °F (37 °C)  (!) 97 2 °F (36 2 °C)   TempSrc: Temporal Temporal  Temporal   SpO2: 94% 95% 96%    Weight: 66 2 kg (146 lb) 66 2 kg (146 lb)     Height: 5' 4" (1 626 m) 5' 4" (1 626 m)           Appearance:  casually dressed, older than stated age and overweight   Behavior:  deviant, evasive and guarded good eye contact well groomed and kept was found laying down on bed with nasal oxygen hooked on but was able to disconnect the nasal oxygen and then come out to meet with me in the team room for interview   Speech:  normal pitch and normal volume   Mood:  anxious and irritable   Affect:  mood-congruent mildly anxious   Thought Process:  circumstantial, logical and perserverative mildly pressured and becoming annoyed when trying to redirect   Thought Content:  delusions  persecutory bizarre delusions about having an electronic bugs implanted on her left forearm, believes that she is not quite safe on the unit but would not admit to any elaborate well systematized persecutory believes even though she made him differences that people who murdered her fiancee may be out to get her, he no current suicidal homicidal thoughts intent or plans reported  No phobias obsessions compulsions elicited    She did not admit to any other delusional believes systems and denied all the delusions that she had verbalized upon her admission   Perceptual Disturbances: None and did not appear responding to any internal stimuli   Risk Potential: none but at potential for not able to care for herself because of recent history of refusal to take medications and care for herself   Sensorium:  person, place, time/date, situation, day of week, month of year, year and time   Cognition:  grossly intact her memory for recent , remote media recall are generally intact but she had difficulty recalling the 3rd out of 3 objects but with reminders she was able to pick the 3rd object  She would not do serial sevens from 100 or serial threes from 30 but with encouragement she could do serial 2s from 20  A problem-solving skills were intact  Her general knowledge was fairly intact  Consciousness:  alert and awake    Attention: Attention concentration span fairly intact   Intellect: Considered to be at least average   Insight:  limited   Judgment: limited      Motor Activity: no abnormal movements           Diagnostic Studies:     Recent Labs:  Results Reviewed     None          I/O Past 24 hours:  No intake/output data recorded  No intake/output data recorded  Impression / Plan:   82-year-old  female with over 40 year history of psychiatric hospitalizations with possible manic as well as ongoing psychotic symptoms of persecutory delusions and bizarre delusions pointing towards diagnosis of  Schizoaffective bipolar type  Recommended Treatment:      Medications  1) continue current medications which are Prozac 10 mg a day for depression and clozapine 75 mg at bedtime for underlying psychosis and dylan which needs to be further titrated  Clozapine was recently started and patient will be monitored for weekly blood work and for any untoward side effects like agranulocytosis and closely watched and educated about risks side effects benefits and precautions  Non-pharmacological treatments  1) patient will be incorporated in the therapeutic milieu on the extended acute care unit and provided with individual therapy, group therapy, milieu therapy and occupational therapy using recovery principles focusing on psychoeducation and the need to take control of her illness and staying in treatment  2) Medical will be consulted to help manage comorbid conditions    Safety  1) Safety/communication plan established targeting dynamic risk factors above      Discharge Plan; most likely to a personal care home if she improves with insight and ability to stay in treatment an act team again    Counseling / Coordination of Care    Total floor / unit time spent today 50 minutes  Greater than 50% of total time was spent with the patient and / or family counseling and / or coordination of care  A description of the counseling / coordination of care  Patient's Rights, confidentiality and exceptions to confidentiality, use of automated medical record, [facility] Tacho Patrick staff access to medical record, and consent to treatment reviewed        Mynor Terry MD

## 2019-07-24 NOTE — DISCHARGE INSTRUCTIONS
Schizoaffective Disorder   WHAT YOU NEED TO KNOW:   Schizoaffective disorder is a long-term mental illness that may change how you think, feel, and act around others  You may not know what is real and what is not real    DISCHARGE INSTRUCTIONS:   Medicines:   · Antipsychotics: These medicines help decrease psychotic symptoms or severe agitation  You may need antiparkinson medicine to control muscle stiffness, twitches, and restlessness caused by antipsychotic medicines  · Antianxiety medicine: This medicine may be given to decrease anxiety and help you feel calm and relaxed  · Antidepressants: These medicines are given to decrease or stop the symptoms of depression, anxiety, and behavior problems  · Mood stabilizers: These medicines help control mood swings  · Anticonvulsants: This medicine is given to control seizures  It may also be used to decrease violent behavior and control your mood swings  · Blood pressure medicines: These may be used to help decrease motor tics (uncontrolled movements)  They may also help you feel calmer, more focused, and less irritable  · Anticholinergics: This medicine may be given to decrease the side effects of other needed medicines  · Take your medicine as directed  Contact your healthcare provider if you think your medicine is not helping or if you have side effects  Tell him of her if you are allergic to any medicine  Keep a list of the medicines, vitamins, and herbs you take  Include the amounts, and when and why you take them  Bring the list or the pill bottles to follow-up visits  Carry your medicine list with you in case of an emergency  Manage your symptoms: The following may help you feel better or prevent symptoms of schizoaffective disorder from coming back:  · Find support for yourself and your family:  Talk with others to help you cope with your illness better  This may also help to improve how you relate to others      · Keep all medical appointments: This will help manage your disease and the side-effects from medicines you may be taking  · Use your medicines as directed:  Put your medicines in a pillbox placed in an area you can easily see  Use a watch with an alarm to help you remember when it is time to take your medicine  Tell your healthcare provider if you know or think you might be pregnant  Do not stop taking your medicines without your healthcare provider's okay  A sudden stop can cause serious medical problems  · Watch for early signs of a relapse and seek help immediately:      ¨ How you think, feel, and see things has changed  ¨ You behave differently than usual     ¨ You become more nervous and upset, but do not know why  ¨ You eat less and have trouble sleeping  ¨ You have little or no interest in friends or activities  Contact your healthcare provider or psychiatrist if:   · You think you are having a relapse  · You are having side effects from your medicine, or they are not helping  · You are not sleeping well or are sleeping more than usual     · You cannot eat or are eating more than usual     · You have muscle spasms, stiffness, or trouble walking  · Your sad feelings or thoughts change the way you function during the day  · You have questions or concerns about your condition or care  Return to the emergency department if:   · You feel like hurting or killing yourself or others  · You feel that your condition is getting worse  · You feel very upset, threaten someone, or you feel violent  · You suddenly have changes in your vision  · You suddenly have chest pain, trouble breathing, or a fever  © 2017 [address] Information is for End User's use only and may not be sold, redistributed or otherwise used for commercial purposes  All illustrations and images included in CareNotes® are the copyrighted property of [employer]  or Johnathan Gomez    The above information is an  only  It is not intended as medical advice for individual conditions or treatments  Talk to your doctor, nurse or pharmacist before following any medical regimen to see if it is safe and effective for you

## 2019-07-25 LAB — GLUCOSE SERPL-MCNC: 116 MG/DL (ref 65–140)

## 2019-07-25 PROCEDURE — 99232 SBSQ HOSP IP/OBS MODERATE 35: CPT | Performed by: PSYCHIATRY & NEUROLOGY

## 2019-07-25 PROCEDURE — 99232 SBSQ HOSP IP/OBS MODERATE 35: CPT | Performed by: FAMILY MEDICINE

## 2019-07-25 PROCEDURE — 82948 REAGENT STRIP/BLOOD GLUCOSE: CPT

## 2019-07-25 RX ORDER — CLOZAPINE 100 MG/1
100 TABLET ORAL
Status: DISCONTINUED | OUTPATIENT
Start: 2019-07-25 | End: 2019-08-05

## 2019-07-25 RX ADMIN — LEVOTHYROXINE SODIUM 125 MCG: 125 TABLET ORAL at 06:20

## 2019-07-25 RX ADMIN — THEOPHYLLINE ANHYDROUS 200 MG: 200 CAPSULE, EXTENDED RELEASE ORAL at 08:32

## 2019-07-25 RX ADMIN — PANTOPRAZOLE SODIUM 40 MG: 40 TABLET, DELAYED RELEASE ORAL at 06:20

## 2019-07-25 RX ADMIN — ALBUTEROL SULFATE 2 PUFF: 90 AEROSOL, METERED RESPIRATORY (INHALATION) at 12:00

## 2019-07-25 RX ADMIN — FLUOXETINE 10 MG: 10 CAPSULE ORAL at 08:32

## 2019-07-25 RX ADMIN — CLOZAPINE 100 MG: 100 TABLET ORAL at 20:44

## 2019-07-25 RX ADMIN — FLUTICASONE FUROATE AND VILANTEROL TRIFENATATE 1 PUFF: 200; 25 POWDER RESPIRATORY (INHALATION) at 08:33

## 2019-07-25 NOTE — PROGRESS NOTES
This writer spoke to Pt in room  Writer familiar with Pt from [facility] and has established a good relationship with her in group settings  Writer ask Pt to be aware she was excited to come to the [facility] so she may receive the help she needed in her Mental Health recovery  Pt used her oxygen level as a reason why she hasn't been doing anything since she came 2 days ago although nursing reports level is good  This writer encourage Pt to attempt to attend at least one group today and to not remain in bed  Pt continued to complain and nursing staff made aware of discussion with Pt  Writer will continue to encourage group and recovery focus activities in her day and will complete a Peer Assessment when Pt feels ready

## 2019-07-25 NOTE — PLAN OF CARE
Problem: Alteration in Thoughts and Perception  Goal: Agree to be compliant with medication regime, as prescribed and report medication side effects  Description  Interventions:  - Offer appropriate PRN medication and supervise ingestion; conduct aims, as needed   Outcome: Progressing     Problem: RESPIRATORY - ADULT  Goal: Achieves optimal ventilation and oxygenation  Description  INTERVENTIONS:  - Assess for changes in respiratory status  - Assess for changes in mentation and behavior  - Position to facilitate oxygenation and minimize respiratory effort  - Oxygen administration by appropriate delivery method based on oxygen saturation (per order) or ABGs  - Initiate smoking cessation education as indicated  - Encourage broncho-pulmonary hygiene including cough, deep breathe, Incentive Spirometry  - Assess the need for suctioning and aspirate as needed  - Assess and instruct to report SOB or any respiratory difficulty  - Respiratory Therapy support as indicated  Outcome: Progressing     Problem: Alteration in Thoughts and Perception  Goal: Attend and participate in unit activities, including therapeutic, recreational, and educational groups  Description  Interventions:  - Provide therapeutic and educational activities daily, encourage attendance and participation, and document same in the medical record   Outcome: Not Progressing  Goal: Complete daily ADLs, including personal hygiene independently, as able  Description  Interventions:  - Observe, teach, and assist patient with ADLS  - Monitor and promote a balance of rest/activity, with adequate nutrition and elimination   Outcome: Not Progressing     [address] has been visible @ intervals in dining room  She is pleasant, but, worrisome about her breathing & her capability to function  Says cannot shower, but, is willing to sit in chair @ sink w/basin to bathe, but, "Not tonight " Procrastinating thus far   Did eat 30% of meal out in dining room while socializing w/peers @ table  Was accepting of scheduled medicines & w/coaxing, walked to window for same  Has asked for periodic O2 sat checks, none lower than 92%  Declined PM Group  Did have an HS snack  Continues to decline signing admission forms until her sister can be present, read forms  Was unable to reach sister tonight  To bed now wearing her nasal O2 @ 1L via compressor

## 2019-07-25 NOTE — PROGRESS NOTES
Progress Note - Ama Turner 1955, 60 y o  female MRN: [id number]    [facility] Encounter: [id number]    Primary Care Provider: Maria Antonia Berger MD   Date and time admitted to hospital: 7/23/2019  5:30 PM        Schizoaffective disorder, bipolar type [facility])  Assessment & Plan  Psychiatry Progress Note    Subjective: Interval History     Patient is adjusting to the unit and has not been attending most of the groups and prefers to lay back on bed  She refused to sign admission papers unless her sister is present and she was not able to be reached by staff to facilitate this  She is still insisting that she cannot breathe or take showers on her own despite staff offering support and assistance  She is preoccupied with her breathing and appears to have some paranoia about the staff  She has been refusing to take the Atrovent claiming that there is "peanut oil in it  She has been sleeping well and has been using the nasal oxygen at night  She is compliant with medications and also refused blood work yesterday  No current suicidal homicidal thoughts intent or plans reported  She is casually dressed fairly groomed but is guarded suspicious evasive been in denial of her illness  No overt hallucinations reported other than some underlying paranoid delusions and believes that there is a micro chip implanted on her left forearm pointing to the 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         Current medications:    Current Facility-Administered Medications:     acetaminophen (TYLENOL) tablet 650 mg, 650 mg, Oral, Q6H PRN, Krystyna Mcclain MD    acetaminophen (TYLENOL) tablet 650 mg, 650 mg, Oral, Q6H PRN, Krystyna Mcclain MD    acetaminophen (TYLENOL) tablet 650 mg, 650 mg, Oral, Q6H PRN, Krystyna Mcclain MD    albuterol (PROVENTIL HFA,VENTOLIN HFA) inhaler 2 puff, 2 puff, Inhalation, Q4H PRN, Krystyna Mcclain MD    benzonatate (TESSALON PERLES) capsule 100 mg, 100 mg, Oral, TID PRN, Krystyna Mcclain MD    cloZAPine (CLOZARIL) tablet 75 mg, 75 mg, Oral, HS, Deep Durand MD, 75 mg at 07/24/19 2045    FLUoxetine (PROzac) capsule 10 mg, 10 mg, Oral, Daily, Deep Durand MD, 10 mg at 07/24/19 0852    fluticasone-vilanterol (BREO ELLIPTA) 200-25 MCG/INH inhaler 1 puff, 1 puff, Inhalation, Daily, Deep Durand MD, 1 puff at 07/24/19 1372    levothyroxine tablet 125 mcg, 125 mcg, Oral, Early Morning, Deep Durand MD, 125 mcg at 07/25/19 0620    OLANZapine (ZyPREXA) tablet 5 mg, 5 mg, Oral, Q8H PRN, Deep Durand MD    pantoprazole (PROTONIX) EC tablet 40 mg, 40 mg, Oral, Early Morning, Deep Durand MD, 40 mg at 07/25/19 0620    theophylline (JEF-24) 24 hr capsule 200 mg, 200 mg, Oral, Daily, Deep Durand MD, 200 mg at 07/24/19 8063    traZODone (DESYREL) tablet 25 mg, 25 mg, Oral, HS PRN, Deep Durand MD  Justification if on more than two antipsychotics:  Only on his clozapine  Side effects if any:  None    Risks , benefits, side effects and precautions of medications discussed with patient and reminded patient to let us know any problems with medications     Objective:     Vital Signs:  Vitals:    07/23/19 2237 07/24/19 0730 07/24/19 1700 07/24/19 1828   BP:  135/69     BP Location:  Left arm     Pulse:  85 94    Resp:  17     Temp:  (!) 97 2 °F (36 2 °C)     TempSrc:  Temporal     SpO2: 96%   92%   Weight:       Height:         Appearance:  age appropriate, casually dressed and overweight older than stated age   Behavior:  deviant, evasive and guarded and suspicious but with good eye contact   Speech:  normal pitch and normal volume but tends to become loud at times   Mood:  anxious and dysthymic   Affect:  mood-congruent   Thought Process:  goal directed and illogical slightly pressured but able to be redirected   Thought Content:  delusions  grandiose and somatic about not being able to breathe despite being on nasal oxygen and paranoid about people out to harm her and Atrovent inhaler tainteded with peanut oil    No current suicidal homicidal thoughts intent or plans reported  No phobias obsessions or compulsions reported   Perceptual Disturbances: None and does not appear responding to internal stimuli   Risk Potential: Tendency to not care for herself if she goes off treatment   Sensorium:  person, place, time/date, situation, day of week, month of year and time   Cognition:  grossly intact   Consciousness:  alert and awake    Attention: Intact concentration and attention span   Intellect: Considered to be at least of average intelligence   Insight:  limited in denial   Judgment: poor      Motor Activity: no abnormal movements         Recent Labs:  Results Reviewed     None          I/O Past 24 hours:  No intake/output data recorded  No intake/output data recorded  Assessment / Plan:     <principal problem not specified>      Reason for continued inpatient care:  Because of underlying paranoia and refusal to go back to the personal care home and inability to care for herself on her own  Acceptance by patient:  Accepting  Rosa Noble in recovery:  About living in another personal care home once she feels better  Understanding of medications :  Has some understanding  Involved in reintegration process:  Adjusting to the unit  Trusting in relatoinship with psychiatrist:  Trusting    Recommended Treatment:    Medication changes:  1) to increase clozapine if she agrees to slowly encouraged to cooperate with blood work as required and monitor for any agranulocytosis or other problems with clozapine    Non-pharmacological treatments  1) start individual therapy group therapy, milieu therapy and occupational therapy and milieu therapy involving multidisciplinary team approach with psychotherapist, case management, nursing, peer support services, art therapist etc using recovery principles and psycho-education about accepting illness and need for treatment       Safety  1) Safety/communication plan established targeting dynamic risk factors above  Discharge Plan most likely back to the a:  Personal care boarding home with act services once her delusions are managed and mood becomes more stabilized and she becomes more receptive to treatment compliance    Counseling / Coordination of Care    Total floor / unit time spent today 15 minutes  Greater than 50% of total time was spent with the patient and / or family counseling and / or coordination of care  A description of the counseling / coordination of care  Patient's Rights, confidentiality and exceptions to confidentiality, use of automated medical record, [facility] staff access to medical record, and consent to treatment reviewed      Ivonne Nevarez MD

## 2019-07-25 NOTE — CONSULTS
Inpatient Consultation - Shanti Wang    Patient Information: Violetta Cage 60 y o  female MRN: [id number]  Unit/Bed#: [facility] 026-08 Encounter: [id number]  PCP: Chelita Lua MD  Date of Admission:  7/23/2019  Date of Consultation: 07/25/19  Requesting Physician: Felisa Florence MD    Reason For Consultation:   Medical management     Assessment/Plan:    * Schizoaffective disorder, bipolar type [facility])  Assessment & Plan  Mgmt per Psychiatry    COPD with asthma [facility])  Assessment & Plan  Acute complaint of SOB and chest tightness, w/ concurrent complaint of anxiousness  Pt was sating 89% on RA during this episode and was placed on 1 5 L of O2  Episode most-likely secondary to anxiety, and pt has been counseled that 89% is acceptable in the setting of COPD  Cont w/ daily inhalers: Breo Ellipta QD  Theophylline 200 mg QD  Albuterol, PRN    Acquired hypothyroidism  Assessment & Plan  Controlled  Last TSH WNL  Cont w/ Levothyroxine, 125 mcg, QD  Gastroesophageal reflux disease without esophagitis  Assessment & Plan  Controlled  Cont w/ Protonix, 40 mg QD      VTE Prophylaxis: Ambulation     Recommendations for Discharge:  · Per psychiatry     Counseling / Coordination of Care Time: 20 minutes  Greater than 50% of total time spent on patient counseling and coordination of care  Collaboration of Care: Were Recommendations Directly Discussed with Primary Treatment Team? - Yes     Subjective     Patient seen and examined at bedside  Current concern regarding SOB and associated chest tightness which she experienced overnight  O2 saturation was 89% at that time  Pt was placed on O2 NC and said she felt better  Currently on 1 5 L  Denying chest pain, SOB, light headedness or dizziness at this time  Review of Systems:    Review of Systems   Constitutional: Negative for activity change, appetite change, chills and fever     HENT: Negative for congestion, ear pain, rhinorrhea, sinus pain and sore throat  Eyes: Negative for pain and visual disturbance  Respiratory: Positive for chest tightness and shortness of breath  Negative for cough and wheezing  Cardiovascular: Negative for chest pain, palpitations and leg swelling  Gastrointestinal: Negative for abdominal pain, constipation, diarrhea, nausea and vomiting  Endocrine: Negative for cold intolerance, heat intolerance and polyuria  Genitourinary: Negative for difficulty urinating, dysuria and pelvic pain  Musculoskeletal: Negative for arthralgias, back pain, gait problem and myalgias  Skin: Negative for color change, pallor, rash and wound  Neurological: Negative for dizziness, weakness, light-headedness, numbness and headaches  Hematological: Negative for adenopathy  Does not bruise/bleed easily  Psychiatric/Behavioral: Negative for agitation, behavioral problems, confusion, decreased concentration and dysphoric mood  The patient is nervous/anxious  Past Medical and Surgical History:   Past Medical History:   Diagnosis Date    Acid reflux     Anxiety     Asthma     Bipolar 1 disorder ([facility])     Chronic pain disorder     Chronic respiratory failure ([facility])     Compression fracture of fourth lumbar vertebra ([facility])     COPD (chronic obstructive pulmonary disease) ([facility])     Depression     GERD (gastroesophageal reflux disease)     History of home oxygen therapy     Hypothyroidism     Lipoma of upper extremity     Psychiatric illness     Schizoaffective disorder ([facility])     Substance abuse ([facility])     Nicotine    Thoracic compression fracture ([facility])     Ventral hernia      No past surgical history on file  Meds/Allergies: Allergies:    Allergies   Allergen Reactions    Peanut-Containing Drug Products Anaphylaxis    Shellfish-Derived Products Anaphylaxis    Antihistamines, Chlorpheniramine-Type     Aspirin     Atrovent [Ipratropium]     Elavil [Amitriptyline]     Iodine      Other reaction(s): Unknown Reaction    Levaquin [Levofloxacin]     Novocain [Procaine]     Penicillins      Able to tolerate azithromycin and vancomycin    Prolixin [Fluphenazine]     Sulfa Antibiotics Other (See Comments)     Unknown Zithromax-Tight Throat     Prior to Admission Medications   Prescriptions Last Dose Informant Patient Reported? Taking?    EPINEPHrine (EPIPEN JR) 0 15 mg/0 3 mL SOAJ   No No   Sig: Inject 0 3 mL (0 15 mg total) into a muscle once as needed for anaphylaxis As needed for allergic reaction   FLUoxetine (PROzac) 10 mg capsule   No No   Sig: Take 1 capsule (10 mg total) by mouth daily   OXYGEN-HELIUM IN   Yes No   Sig: Inhale 3 L    acetaminophen (TYLENOL) 325 mg tablet   No No   Sig: Take 2 tablets (650 mg total) by mouth every 6 (six) hours as needed for mild pain   albuterol (PROVENTIL HFA,VENTOLIN HFA) 90 mcg/act inhaler   No No   Sig: Inhale 2 puffs every 4 (four) hours as needed for wheezing   cloZAPine (CLOZARIL) 25 mg tablet   No No   Sig: Take 3 tablets (75 mg total) by mouth daily at bedtime   levothyroxine 125 mcg tablet   No No   Sig: Take 1 tablet (125 mcg total) by mouth daily in the early morning   pantoprazole (PROTONIX) 40 mg tablet   No No   Sig: Take 1 tablet (40 mg total) by mouth daily in the early morning   theophylline (JEF-24) 200 MG 24 hr capsule   No No   Sig: Take 1 capsule (200 mg total) by mouth daily      Facility-Administered Medications: None     Social History:     Social History     Socioeconomic History    Marital status: Single     Spouse name: Not on file    Number of children: Not on file    Years of education: Not on file    Highest education level: Not on file   Occupational History    Not on file   Social Needs    Financial resource strain: Not on file    Food insecurity:     Worry: Not on file     Inability: Not on file    Transportation needs:     Medical: Not on file     Non-medical: Not on file   Tobacco Use    Smoking status: Current Every Day Smoker     Packs/day: 0 20 Types: Cigarettes    Smokeless tobacco: Never Used   Substance and Sexual Activity    Alcohol use: Never     Frequency: Never     Binge frequency: Never    Drug use: No    Sexual activity: Not Currently   Lifestyle    Physical activity:     Days per week: Not on file     Minutes per session: Not on file    Stress: Not on file   Relationships    Social connections:     Talks on phone: Not on file     Gets together: Not on file     Attends Restoration service: Not on file     Active member of club or organization: Not on file     Attends meetings of clubs or organizations: Not on file     Relationship status: Not on file    Intimate partner violence:     Fear of current or ex partner: Not on file     Emotionally abused: Not on file     Physically abused: Not on file     Forced sexual activity: Not on file   Other Topics Concern    Not on file   Social History Narrative    Not on file       Family History:  Family History   Problem Relation Age of Onset    Arthritis Mother        Physical Exam:     Vitals:   Blood Pressure: 118/58 (07/25/19 0732)  Pulse: (!) 107 (07/25/19 0732)  Temperature: (!) 97 4 °F (36 3 °C) (07/25/19 0730)  Temp Source: Temporal (07/25/19 0730)  Respirations: 20 (07/25/19 0730)  Height: 5' 4" (162 6 cm) (07/23/19 1821)  Weight - Scale: 66 2 kg (146 lb) (07/23/19 1821)  SpO2: 92 % (07/24/19 1828)    Physical Exam   Constitutional: She is oriented to person, place, and time  She appears well-developed and well-nourished  No distress  HENT:   Head: Normocephalic and atraumatic  Eyes: Pupils are equal, round, and reactive to light  Right eye exhibits no discharge  Left eye exhibits no discharge  Neck: Normal range of motion  Cardiovascular: Normal rate and normal heart sounds  Pulmonary/Chest: Effort normal  No respiratory distress  She has decreased breath sounds  She has no wheezes   She has rhonchi in the right middle field, the right lower field, the left middle field and the left lower field  Abdominal: Soft  Bowel sounds are normal  She exhibits no distension  There is no tenderness  Musculoskeletal: Normal range of motion  Neurological: She is alert and oriented to person, place, and time  Skin: Skin is warm  Psychiatric: Her behavior is normal  Her mood appears not anxious  Additional Data:     Lab Results: I have personally reviewed pertinent reports  Results from last 7 days   Lab Units 07/23/19  1452   WBC Thousand/uL 7 90   HEMOGLOBIN g/dL 13 2   HEMATOCRIT % 40 0   PLATELETS Thousands/uL 244   NEUTROS PCT % 53   LYMPHS PCT % 34   MONOS PCT % 10   EOS PCT % 2     Results from last 7 days   Lab Units 07/24/19  0918   POTASSIUM mmol/L 4 1   CHLORIDE mmol/L 102   CO2 mmol/L 29   BUN mg/dL 14   CREATININE mg/dL 0 79   CALCIUM mg/dL 9 8   ALK PHOS U/L 123*   ALT U/L 17   AST U/L 20           Imaging: I have personally reviewed pertinent reports  No results found  ** Please Note: This note has been constructed using a voice recognition system   Erika Batres MD  07/25/19  9:59 AM

## 2019-07-25 NOTE — PLAN OF CARE
Problem: Alteration in Thoughts and Perception  Goal: Agree to be compliant with medication regime, as prescribed and report medication side effects  Description  Interventions:  - Offer appropriate PRN medication and supervise ingestion; conduct aims, as needed   Outcome: Progressing     Problem: PAIN - ADULT  Goal: Verbalizes/displays adequate comfort level or baseline comfort level  Description  Interventions:  - Encourage patient to monitor pain and request assistance  - Assess pain using appropriate pain scale  - Administer analgesics based on type and severity of pain and evaluate response  - Implement non-pharmacological measures as appropriate and evaluate response  - Consider cultural and social influences on pain and pain management  - Notify physician/advanced practitioner if interventions unsuccessful or patient reports new pain  Outcome: Progressing     Problem: SAFETY ADULT  Goal: Patient will remain free of falls  Description  INTERVENTIONS:  - Assess patient frequently for physical needs  -  Identify cognitive and physical deficits and behaviors that affect risk of falls    -  [place] fall precautions as indicated by assessment   - Educate patient/family on patient safety including physical limitations  - Instruct patient to call for assistance with activity based on assessment  - Modify environment to reduce risk of injury  - Consider OT/PT consult to assist with strengthening/mobility  Outcome: Progressing     Problem: RESPIRATORY - ADULT  Goal: Achieves optimal ventilation and oxygenation  Description  INTERVENTIONS:  - Assess for changes in respiratory status  - Assess for changes in mentation and behavior  - Position to facilitate oxygenation and minimize respiratory effort  - Oxygen administration by appropriate delivery method based on oxygen saturation (per order) or ABGs  - Initiate smoking cessation education as indicated  - Encourage broncho-pulmonary hygiene including cough, deep breathe, Incentive Spirometry  - Assess the need for suctioning and aspirate as needed  - Assess and instruct to report SOB or any respiratory difficulty  - Respiratory Therapy support as indicated  Outcome: Progressing   The patient slept through the night with no behaviors or issues noted  No s/s respiratory distress noted  Oxygen maintained at 1L via NC while sleeping  Fall and aspiration precautions maintained  Med compliant  Continue to monitor

## 2019-07-25 NOTE — ASSESSMENT & PLAN NOTE
Controlled    -Continue with Protonix, 40 mg daily  -Continue Mylanta and Sucralfate   -Zofran 4 mg Q 6 hrs PRN for nausea   -Adhere to a non-acidic diet

## 2019-07-25 NOTE — PROGRESS NOTES
07/25/19 0800   Team Meeting   Meeting Type Daily Rounds   Team Members Present   Team Members Present Physician;Nurse;; Other (Discipline and Name)   Physician Team Member Dr Lg Cortez, RN   Care Management Team Member Brenda Cancino   Other (Discipline and Name) Emerson Simon [place]; SHANIKA Trevino     Somatic in complaints  Using her somatic complaints for reasons why she can't do thing such as go to the dinning room for breakfast or attend groups

## 2019-07-25 NOTE — ASSESSMENT & PLAN NOTE
Psychiatry Progress Note    Subjective: Interval History     Patient is adjusting to the unit and has not been attending most of the groups and prefers to lay back on bed  She refused to sign admission papers unless her sister is present and she was not able to be reached by staff to facilitate this  She is still insisting that she cannot breathe or take showers on her own despite staff offering support and assistance  She is preoccupied with her breathing and appears to have some paranoia about the staff  She has been refusing to take the Atrovent claiming that there is "peanut oil in it  She has been sleeping well and has been using the nasal oxygen at night  She is compliant with medications and also refused blood work yesterday  No current suicidal homicidal thoughts intent or plans reported  She is casually dressed fairly groomed but is guarded suspicious evasive been in denial of her illness  No overt hallucinations reported other than some underlying paranoid delusions and believes that there is a micro chip implanted on her left forearm pointing to the 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         Current medications:    Current Facility-Administered Medications:     acetaminophen (TYLENOL) tablet 650 mg, 650 mg, Oral, Q6H PRN, Jennifer Taveras MD    acetaminophen (TYLENOL) tablet 650 mg, 650 mg, Oral, Q6H PRN, Jennifer Taveras MD    acetaminophen (TYLENOL) tablet 650 mg, 650 mg, Oral, Q6H PRN, Jennifer Taveras MD    albuterol (PROVENTIL HFA,VENTOLIN HFA) inhaler 2 puff, 2 puff, Inhalation, Q4H PRN, Jennifer Taveras MD    benzonatate (TESSALON PERLES) capsule 100 mg, 100 mg, Oral, TID PRN, Jennifer Taveras MD    cloZAPine (CLOZARIL) tablet 75 mg, 75 mg, Oral, HS, Jennifer Taveras MD, 75 mg at 07/24/19 2045    FLUoxetine (PROzac) capsule 10 mg, 10 mg, Oral, Daily, Jennifer Taveras MD, 10 mg at 07/24/19 0852    fluticasone-vilanterol (BREO ELLIPTA) 200-25 MCG/INH inhaler 1 puff, 1 puff, Inhalation, Daily, Jennifer Taveras MD, 1 puff at 07/24/19 5246    levothyroxine tablet 125 mcg, 125 mcg, Oral, Early Morning, Roberto Colorado MD, 125 mcg at 07/25/19 0620    OLANZapine (ZyPREXA) tablet 5 mg, 5 mg, Oral, Q8H PRN, Roberto Colorado MD    pantoprazole (PROTONIX) EC tablet 40 mg, 40 mg, Oral, Early Morning, Roberto Colorado MD, 40 mg at 07/25/19 0620    theophylline (JEF-24) 24 hr capsule 200 mg, 200 mg, Oral, Daily, Roberto Colorado MD, 200 mg at 07/24/19 1575    traZODone (DESYREL) tablet 25 mg, 25 mg, Oral, HS PRN, Roberto Colorado MD  Justification if on more than two antipsychotics:  Only on his clozapine  Side effects if any:  None    Risks , benefits, side effects and precautions of medications discussed with patient and reminded patient to let us know any problems with medications     Objective:     Vital Signs:  Vitals:    07/23/19 2237 07/24/19 0730 07/24/19 1700 07/24/19 1828   BP:  135/69     BP Location:  Left arm     Pulse:  85 94    Resp:  17     Temp:  (!) 97 2 °F (36 2 °C)     TempSrc:  Temporal     SpO2: 96%   92%   Weight:       Height:         Appearance:  age appropriate, casually dressed and overweight older than stated age   Behavior:  deviant, evasive and guarded and suspicious but with good eye contact   Speech:  normal pitch and normal volume but tends to become loud at times   Mood:  anxious and dysthymic   Affect:  mood-congruent   Thought Process:  goal directed and illogical slightly pressured but able to be redirected   Thought Content:  delusions  grandiose and somatic about not being able to breathe despite being on nasal oxygen and paranoid about people out to harm her and Atrovent inhaler tainteded with peanut oil  No current suicidal homicidal thoughts intent or plans reported    No phobias obsessions or compulsions reported   Perceptual Disturbances: None and does not appear responding to internal stimuli   Risk Potential: Tendency to not care for herself if she goes off treatment   Sensorium:  person, place, time/date, situation, day of week, month of year and time   Cognition:  grossly intact   Consciousness:  alert and awake    Attention: Intact concentration and attention span   Intellect: Considered to be at least of average intelligence   Insight:  limited in denial   Judgment: poor      Motor Activity: no abnormal movements         Recent Labs:  Results Reviewed     None          I/O Past 24 hours:  No intake/output data recorded  No intake/output data recorded  Assessment / Plan:     <principal problem not specified>      Reason for continued inpatient care:  Because of underlying paranoia and refusal to go back to the personal care home and inability to care for herself on her own  Acceptance by patient:  Accepting  [place] in recovery:  About living in another personal care home once she feels better  Understanding of medications :  Has some understanding  Involved in reintegration process:  Adjusting to the unit  Trusting in relatoinship with psychiatrist:  Trusting    Recommended Treatment:    Medication changes:  1) to increase clozapine if she agrees to slowly encouraged to cooperate with blood work as required and monitor for any agranulocytosis or other problems with clozapine    Non-pharmacological treatments  1) start individual therapy group therapy, milieu therapy and occupational therapy and milieu therapy involving multidisciplinary team approach with psychotherapist, case management, nursing, peer support services, art therapist etc using recovery principles and psycho-education about accepting illness and need for treatment   Safety  1) Safety/communication plan established targeting dynamic risk factors above    Discharge Plan most likely back to the a:  Personal care boarding home with act services once her delusions are managed and mood becomes more stabilized and she becomes more receptive to treatment compliance    Counseling / Coordination of Care    Total floor / unit time spent today 15 minutes  Greater than 50% of total time was spent with the patient and / or family counseling and / or coordination of care  A description of the counseling / coordination of care  Patient's Rights, confidentiality and exceptions to confidentiality, use of automated medical record, [facility] staff access to medical record, and consent to treatment reviewed      Meredith Wesley MD

## 2019-07-25 NOTE — PLAN OF CARE
Problem: Alteration in Thoughts and Perception  Goal: Verbalize thoughts and feelings  Description  Interventions:  - Promote a nonjudgmental and trusting relationship with the patient through active listening and therapeutic communication  - Assess patient's level of functioning, behavior and potential for risk  - Engage patient in 1 on 1 interactions for a minimum of 15 minutes each session  - Encourage patient to express fears, feelings, frustrations, and discuss symptoms    - [place] patient to reality, help patient recognize reality-based thinking   - Administer medications as ordered and assess for potential side effects  - Provide the patient education related to the signs and symptoms of the illness and desired effects of prescribed medications  Outcome: Not Progressing  Goal: Attend and participate in unit activities, including therapeutic, recreational, and educational groups  Description  Interventions:  - Provide therapeutic and educational activities daily, encourage attendance and participation, and document same in the medical record   Outcome: Not Progressing  Goal: Complete daily ADLs, including personal hygiene independently, as able  Description  Interventions:  - Observe, teach, and assist patient with ADLS  - Monitor and promote a balance of rest/activity, with adequate nutrition and elimination   Outcome: Not Progressing    Individual was visible on the unit only at meal time and with prompting  Very focused on breathing/ oxygen level, and uses same to refrane from program participation  No groups were attended  At the beginning of the shift, pulse ox room air 88-89%  She utilized O2 1 L for 15min when sat was 92%  She complained of SOB, given prn inhaler at 1200 which reports as being effective  She continues to decline performing ADLs including showering  She has agreed to wash at sink this evening  Last recorded shower 7/`8/19  [facility] on the unit this morning to see her  Able to express needs, very resistive to accepting responsibility and completing tasks independently  Will continue to monitor

## 2019-07-26 LAB
BASOPHILS # BLD AUTO: 0.1 THOUSANDS/ΜL (ref 0–0.1)
BASOPHILS NFR BLD AUTO: 1 % (ref 0–1)
EOSINOPHIL # BLD AUTO: 0.1 THOUSAND/ΜL (ref 0–0.4)
EOSINOPHIL NFR BLD AUTO: 2 % (ref 0–6)
ERYTHROCYTE [DISTWIDTH] IN BLOOD BY AUTOMATED COUNT: 14.9 %
HCT VFR BLD AUTO: 41 % (ref 36–46)
HGB BLD-MCNC: 13.5 G/DL (ref 12–16)
LYMPHOCYTES # BLD AUTO: 2.3 THOUSANDS/ΜL (ref 0.5–4)
LYMPHOCYTES NFR BLD AUTO: 34 % (ref 25–45)
MCH RBC QN AUTO: 29.9 PG (ref 26–34)
MCHC RBC AUTO-ENTMCNC: 32.9 G/DL (ref 31–36)
MCV RBC AUTO: 91 FL (ref 80–100)
MONOCYTES # BLD AUTO: 0.8 THOUSAND/ΜL (ref 0.2–0.9)
MONOCYTES NFR BLD AUTO: 11 % (ref 1–10)
NEUTROPHILS # BLD AUTO: 3.7 THOUSANDS/ΜL (ref 1.8–7.8)
NEUTS SEG NFR BLD AUTO: 53 % (ref 45–65)
PLATELET # BLD AUTO: 230 THOUSANDS/UL (ref 150–450)
PMV BLD AUTO: 8.9 FL (ref 8.9–12.7)
RBC # BLD AUTO: 4.51 MILLION/UL (ref 4–5.2)
WBC # BLD AUTO: 6.9 THOUSAND/UL (ref 4.5–11)

## 2019-07-26 PROCEDURE — 85025 COMPLETE CBC W/AUTO DIFF WBC: CPT | Performed by: PSYCHIATRY & NEUROLOGY

## 2019-07-26 RX ADMIN — PANTOPRAZOLE SODIUM 40 MG: 40 TABLET, DELAYED RELEASE ORAL at 06:34

## 2019-07-26 RX ADMIN — LEVOTHYROXINE SODIUM 125 MCG: 125 TABLET ORAL at 06:34

## 2019-07-26 RX ADMIN — FLUOXETINE 10 MG: 10 CAPSULE ORAL at 08:45

## 2019-07-26 RX ADMIN — THEOPHYLLINE ANHYDROUS 200 MG: 200 CAPSULE, EXTENDED RELEASE ORAL at 08:45

## 2019-07-26 RX ADMIN — FLUTICASONE FUROATE AND VILANTEROL TRIFENATATE 1 PUFF: 200; 25 POWDER RESPIRATORY (INHALATION) at 08:45

## 2019-07-26 RX ADMIN — CLOZAPINE 100 MG: 100 TABLET ORAL at 20:55

## 2019-07-26 NOTE — QUICK NOTE
Writer attempted to prompt Pt to attend IMR group however, Pt continues to stay in bed and resist any recovery based programming

## 2019-07-26 NOTE — PROGRESS NOTES
Psychiatry Progress Note    Subjective: Interval History     Norris Bennett continues to adjust to the [facility] milieu  She was initially very resistive to staff assisting her  She refused to shower and, instead, bathed herself at the sink  When she showered the prior day, she insisted that she take her oxygen tank into the shower  She has been somatically preoccupied, stating that if she drinks water, she will choke  She has no issues with other fluids  She has been telling staff that she can't get out of bed because she has COPD  She has repeatedly asked the staff to check her pulse ox, which has been stable in the 90's  She denies any homicidal or suicidal ideations  She has been showing a slight improvement in her initiative, which has brought great pride to her  She did sleep well  She has not attended groups       Behavior over the last 24 hours:  unchanged  Sleep: normal  Appetite: normal  Medication side effects: No  ROS: no complaints    Current medications:    Current Facility-Administered Medications:     acetaminophen (TYLENOL) tablet 650 mg, 650 mg, Oral, Q6H PRN, Amina Aparicio MD    acetaminophen (TYLENOL) tablet 650 mg, 650 mg, Oral, Q6H PRN, Amina Aparicio MD    acetaminophen (TYLENOL) tablet 650 mg, 650 mg, Oral, Q6H PRN, Amina Aparicio MD    albuterol (PROVENTIL HFA,VENTOLIN HFA) inhaler 2 puff, 2 puff, Inhalation, Q4H PRN, Amina Aparicio MD, 2 puff at 07/25/19 1200    benzonatate (TESSALON PERLES) capsule 100 mg, 100 mg, Oral, TID PRN, Amina Aparicio MD    cloZAPine (CLOZARIL) tablet 100 mg, 100 mg, Oral, HS, Amina Aparicio MD, 100 mg at 07/25/19 2044    FLUoxetine (PROzac) capsule 10 mg, 10 mg, Oral, Daily, Amina Aparicio MD, 10 mg at 07/26/19 0845    fluticasone-vilanterol (BREO ELLIPTA) 200-25 MCG/INH inhaler 1 puff, 1 puff, Inhalation, Daily, Amina Aparicio MD, 1 puff at 07/26/19 0845    levothyroxine tablet 125 mcg, 125 mcg, Oral, Early Morning, Amina Aparicio MD, 125 mcg at 07/26/19 0634    OLANZapine (ZyPREXA) tablet 5 mg, 5 mg, Oral, Q8H PRN, Meredith Wesley MD    pantoprazole (PROTONIX) EC tablet 40 mg, 40 mg, Oral, Early Morning, Meredith Wesley MD, 40 mg at 07/26/19 3583    theophylline (JEF-24) 24 hr capsule 200 mg, 200 mg, Oral, Daily, Meredith Wesley MD, 200 mg at 07/26/19 0845    traZODone (DESYREL) tablet 25 mg, 25 mg, Oral, HS PRN, Meredith Wesley MD    Current Problem List:    Patient Active Problem List   Diagnosis    Sepsis ([facility] )    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[facility] )    Tobacco use disorder, continuous    Bipolar disorder ([facility] )    Left hip pain    Lactic acidosis    Hypokalemia    Hypomagnesemia    Compression fracture of L4 lumbar vertebra    Thoracic compression fracture (HCC)    Ventral hernia    Parapneumonic effusion    Acute on chronic respiratory failure with hypoxia (HCC)    Chronic respiratory failure (HCC)    Hypophosphatemia    Elevated MCV    Compression deformity of vertebra    Schizoaffective disorder, bipolar type (HCC)    Acquired hypothyroidism    Gastroesophageal reflux disease without esophagitis    Abnormal CT of the chest    Excessive cerumen in left ear canal    Lipoma of right upper extremity    Polydipsia    Localized swelling of both lower legs    Noncompliant with deep vein thrombosis (DVT) prophylaxis    Allergic conjunctivitis of right eye       Problem list reviewed 07/26/19     Objective:     Vital Signs:  Vitals:    07/25/19 0730 07/25/19 0732 07/25/19 1620 07/26/19 0730   BP: 130/62 118/58  134/65   BP Location: Left arm Left arm  Right arm   Pulse: 97 (!) 107  95   Resp: 20   20   Temp: (!) 97 4 °F (36 3 °C)   (!) 97 4 °F (36 3 °C)   TempSrc: Temporal      SpO2:   96%    Weight:       Height:             Appearance:  age appropriate and casually dressed   Behavior:  uncooperative   Speech:  normal volume   Mood:  anxious and irritable   Affect:  constricted   Thought Process:  normal   Thought Content:  delusions  persecutory   Perceptual Disturbances: None Risk Potential: none   Sensorium:  person, place, situation and time   Cognition:  intact   Consciousness:  alert and awake    Attention: attention span and concentration were age appropriate   Intellect: average   Insight:  limited   Judgment: limited      Motor Activity: no abnormal movements       I/O Past 24 hours:  No intake/output data recorded  No intake/output data recorded  Labs:  Reviewed 07/26/19      Assessment / Plan:     Schizoaffective disorder, bipolar type ([facility] )    Recommended Treatment:      Medication changes:  1) continue current medication regimen  Non-pharmacological treatments  1) Continue with group therapy, milieu therapy and occupational therapy  Safety  1) Safety/communication plan established targeting dynamic risk factors above  2) Risks, benefits, and possible side effects of medications explained to patient and patient verbalizes understanding  Counseling / Coordination of Care    Total floor / unit time spent today 20 minutes  Greater than 50% of total time was spent with the patient and / or family counseling and / or coordination of care  A description of the counseling / coordination of care  Patient's Rights, confidentiality and exceptions to confidentiality, use of automated medical record, [facility] staff access to medical record, and consent to treatment reviewed      ABILIO Gagnon

## 2019-07-26 NOTE — PLAN OF CARE
Problem: Alteration in Thoughts and Perception  Goal: Treatment Goal: Gain control of psychotic behaviors/thinking, reduce/eliminate presenting symptoms and demonstrate improved reality functioning upon discharge  Outcome: Progressing  Goal: Verbalize thoughts and feelings  Description  Interventions:  - Promote a nonjudgmental and trusting relationship with the patient through active listening and therapeutic communication  - Assess patient's level of functioning, behavior and potential for risk  - Engage patient in 1 on 1 interactions for a minimum of 15 minutes each session  - Encourage patient to express fears, feelings, frustrations, and discuss symptoms    - [place] patient to reality, help patient recognize reality-based thinking   - Administer medications as ordered and assess for potential side effects  - Provide the patient education related to the signs and symptoms of the illness and desired effects of prescribed medications  Outcome: Progressing  Goal: Agree to be compliant with medication regime, as prescribed and report medication side effects  Description  Interventions:  - Offer appropriate PRN medication and supervise ingestion; conduct aims, as needed   Outcome: Progressing  Goal: Attend and participate in unit activities, including therapeutic, recreational, and educational groups  Description  Interventions:  - Provide therapeutic and educational activities daily, encourage attendance and participation, and document same in the medical record   Outcome: Progressing  Goal: Recognize dysfunctional thoughts, communicate reality-based thoughts at the time of discharge  Description  Interventions:  - Provide medication and psycho-education to assist patient in compliance and developing insight into his/her illness   Outcome: Progressing  Goal: Complete daily ADLs, including personal hygiene independently, as able  Description  Interventions:  - Observe, teach, and assist patient with ADLS  - Monitor and promote a balance of rest/activity, with adequate nutrition and elimination   Outcome: Progressing     Problem: Ineffective Coping  Goal: Identifies ineffective coping skills  Outcome: Progressing  Goal: Identifies healthy coping skills  Outcome: Progressing  Goal: Demonstrates healthy coping skills  Outcome: Progressing  Goal: Participates in unit activities  Description  Interventions:  - Provide therapeutic environment   - Provide required programming   - Redirect inappropriate behaviors   Outcome: Progressing  Goal: Patient/Family participate in treatment and DC plans  Description  Interventions:  - Provide therapeutic environment  Outcome: Progressing  Goal: Patient/Family verbalizes awareness of resources  Outcome: Progressing  Goal: Understands least restrictive measures  Description  Interventions:  - Utilize least restrictive behavior  Outcome: Progressing     Problem: Risk for Self Injury/Neglect  Goal: Treatment Goal: Remain safe during length of stay, learn and adopt new coping skills, and be free of self-injurious ideation, impulses and acts at the time of discharge  Outcome: Progressing  Goal: Verbalize thoughts and feelings  Description  Interventions:  - Assess and re-assess patient's lethality and potential for self-injury  - Engage patient in 1:1 interactions, daily, for a minimum of 15 minutes  - Encourage patient to express feelings, fears, frustrations, hopes  - Establish rapport/trust with patient   Outcome: Progressing  Goal: Refrain from harming self  Description  Interventions:  - Monitor patient closely, per order  - Develop a trusting relationship  - Supervise medication ingestion, monitor effects and side effects   Outcome: Progressing  Goal: Attend and participate in unit activities, including therapeutic, recreational, and educational groups  Description  Interventions:  - Provide therapeutic and educational activities daily, encourage attendance and participation, and document same in the medical record  - Obtain collateral information, encourage visitation and family involvement in care   Outcome: Progressing  Goal: Recognize maladaptive responses and adopt new coping mechanisms  Outcome: Progressing  Goal: Complete daily ADLs, including personal hygiene independently, as able  Description  Interventions:  - Observe, teach, and assist patient with ADLS  - Monitor and promote a balance of rest/activity, with adequate nutrition and elimination  Outcome: Progressing     Problem: Depression  Goal: Treatment Goal: Demonstrate behavioral control of depressive symptoms, verbalize feelings of improved mood/affect, and adopt new coping skills prior to discharge  Outcome: Progressing  Goal: Verbalize thoughts and feelings  Description  Interventions:  - Assess and re-assess patient's level of risk   - Engage patient in 1:1 interactions, daily, for a minimum of 15 minutes   - Encourage patient to express feelings, fears, frustrations, hopes   Outcome: Progressing  Goal: Refrain from isolation  Description  Interventions:  - Develop a trusting relationship   - Encourage socialization   Outcome: Progressing  Goal: Refrain from self-neglect  Outcome: Progressing     Problem: Anxiety  Goal: Anxiety is at manageable level  Description  Interventions:  - Assess and monitor patient's anxiety level  - Monitor for signs and symptoms of anxiety both physical and emotional (heart palpitations, chest pain, shortness of breath, headaches, nausea, feeling jumpy, restlessness, irritable, apprehensive)  - Collaborate with interdisciplinary team and initiate plan and interventions as ordered    - [place] patient to unit/surroundings  - Explain treatment plan  - Encourage participation in care  - Encourage verbalization of concerns/fears  - Identify coping mechanisms  - Assist in developing anxiety-reducing skills  - Administer/offer alternative therapies  - Limit or eliminate stimulants  Outcome: Progressing Problem: Alteration in Orientation  Goal: Interact with staff daily  Description  Interventions:  - Assess and re-assess patient's level of orientation  - Engage patient in 1 on 1 interactions, daily, for a minimum of 15 minutes   - Establish rapport/trust with patient   Outcome: Progressing  Goal: Cooperate with recommended testing/procedures  Description  Interventions:  - Determine need for ancillary testing  - Observe for mental status changes  - Implement falls/precaution protocol   Outcome: Progressing     Problem: PAIN - ADULT  Goal: Verbalizes/displays adequate comfort level or baseline comfort level  Description  Interventions:  - Encourage patient to monitor pain and request assistance  - Assess pain using appropriate pain scale  - Administer analgesics based on type and severity of pain and evaluate response  - Implement non-pharmacological measures as appropriate and evaluate response  - Consider cultural and social influences on pain and pain management  - Notify physician/advanced practitioner if interventions unsuccessful or patient reports new pain  Outcome: Progressing     Problem: SAFETY ADULT  Goal: Patient will remain free of falls  Description  INTERVENTIONS:  - Assess patient frequently for physical needs  -  Identify cognitive and physical deficits and behaviors that affect risk of falls    -  [place] fall precautions as indicated by assessment   - Educate patient/family on patient safety including physical limitations  - Instruct patient to call for assistance with activity based on assessment  - Modify environment to reduce risk of injury  - Consider OT/PT consult to assist with strengthening/mobility  Outcome: Progressing     Problem: RESPIRATORY - ADULT  Goal: Achieves optimal ventilation and oxygenation  Description  INTERVENTIONS:  - Assess for changes in respiratory status  - Assess for changes in mentation and behavior  - Position to facilitate oxygenation and minimize respiratory effort  - Oxygen administration by appropriate delivery method based on oxygen saturation (per order) or ABGs  - Initiate smoking cessation education as indicated  - Encourage broncho-pulmonary hygiene including cough, deep breathe, Incentive Spirometry  - Assess the need for suctioning and aspirate as needed  - Assess and instruct to report SOB or any respiratory difficulty  - Respiratory Therapy support as indicated  Outcome: Progressing       7/26/19  7-3  BM 7/23 Sink Bath 7/25, Did not attend groups  Out of bed for meds and meals  Ate 25% of breakfast and 75% of lunch  Labs obtained via Clinical Coordinator from 6B, CBC w/diff wnl, ANC 3 7  Patient is pleasant but choosing to remain in bed  She is insistent that COPD does not allow her to care for herself and that she needs assistance with bathing and cannot go to groups because she will get short of breath  Staff continues to encourage patient to engage and participate in program activities  Clinical Care Coordinator from 6B is familiar with this patient and relayed that patient can indeed drink water and that she is not an aspiration risk  CCC reminded patient of same while drawing blood  Patient is highly resistant to staff suggestions and remains in bed this shift  O2 sat is 92% on Room Air

## 2019-07-26 NOTE — PROGRESS NOTES
Pharmacy Clozapine Monitoring Progress Note     Lizbeth Jhaveri is receiving 100 mg clozapine daily at bedtime  Assessment/Plan:    Current phase of treatment is initation/titration  **If clozapine therapy is interrupted for more than 48 hours, therapy MUST be restarted at the initial titration dose to avoid hypotension, bradycardia, and syncope  **    Patient is eligible to receive clozapine and the [address] monitoring frequency is weekly per clozapine REMS  Most recent [address] was updated into the REMS program      The most recent [address] was   Lab Results   Component Value Date    NEUTROABS 3 70 07/26/2019    and the next lab is due on 8/2/19  Last Clozapine level (if available):    No results found for: CLOZAPINE  No results found for: NCLOZIP            Pharmacist Recommendations:    1  Patient is REMS eligible and ANC was updated with weekly frequency  Next [address] due 8/2/19  2  Recommend bowel regimen senna-docusate 8 6 - 50mg twice daily for prophylaxis with clozapine initiation  Monitoring: Adverse Effect Suggested Monitoring Patient's Status    Agranulocytosis ANC baseline and then repeat weekly for first 6 months and every 2 weeks for the second 6 months  Maintenance after one year of therapy every month  The most recent [address] was   Lab Results   Component Value Date    NEUTROABS 3 70 07/26/2019       This ANC is considered normal range (ANC >/= 1500/mcL)  Continue current treatment and monitoring course  Respiratory Depression Please ensure patient is not on concomitant respiratory lowering medications such as benzodiazepines, sedative hypnotics (eg  zolpidem) This patient is not on respiratory depression lowering medications   Myocarditis Baseline and weekly EKG, CRP, BNP, and troponin  Weekly symptomatic complaints of fatigue, dyspnea, tachycardia, chest pain, palpitations, and fever for the first 4 weeks    Last EKG Results on 7/17/2019  showed:  normal sinus rhythm and left axis deviation    Last QTC on 7/17/2019 was   0   Lab Value Date/Time    QTCINT 481 07/17/2019 0617       Last BNP was No results found for: NTBNP    Last Troponin was  0   Lab Value Date/Time    TROPONINI <0 01 05/01/2019 1318    TROPONINI <0 04 05/10/2015 1948      Orthostatic hypotension/  bradycardia Blood pressure and vital signs      Monitor blood pressure and orthostatic hypotension at least twice daily upon initiation and continue to follow at least once daily afterwards  /65 (BP Location: Right arm) Comment: standing 128/70 pulse 100  Pulse 95   Temp (!) 97 4 °F (36 3 °C)   Resp 20   Ht 5' 4" (1 626 m)   Wt 66 2 kg (146 lb)   SpO2 96%   BMI 25 06 kg/m²             Constipation (bowel obstruction) Assess at baseline and weekly during first four months of therapy  Docusate 100mg BID and Miralax 17 grams daily recommended initially  There is no prophylactic bowel regimen however there is PRN miralax ordered for constipation  Would recommend standing stool softner    Sialorrhea Assess at baseline and weekly during first four months of therapy  May be managed using sublingual anticholinergic preparations (atropine 1% eye drops)  Patient is not experiencing sialorrhea  This will continue to be monitored  Please note that per current REMS protocol the provider can submit a treatment rationale that justifies clozapine treatment even if patient parameters are outside of REMS recommendations  The Clozapine REMS is an FDA-mandated program implemented by the manufacturers of clozapine  ([employer]  com/CpmgClozapineUI/home  u)  Pharmacy will continue to follow patient with team, thank you    Electronically signed by: Bryson Arnold, PharmD, [place] [address], Clinical Pharmacist - Psychiatry

## 2019-07-26 NOTE — PLAN OF CARE
Problem: Alteration in Orientation  Goal: Cooperate with recommended testing/procedures  Description  Interventions:  - Determine need for ancillary testing  - Observe for mental status changes  - Implement falls/precaution protocol   Outcome: Not Progressing   Patient refused to allow staff to draw her blood for labs this morning  Re-approached her and explained the importance of having ordered labs done so that the doctor can adjust treatment accordingly  Afterward, patient asked if she could have them drawn after breakfast, agreed this would be an acceptable compromise  Encouraged her to drink water to ensure successful attempt on the first time  She replied that she was afraid to drink water as she has difficulty drinking water, and tends to choke  Said that she was ok drinking other fluids "it's just the water that I have trouble with"  Inquired if she's had a swallow study done, she replied that to her knowledge she has not  Told her that perhaps she might need to have her water thickened and that staff will need to discuss follow up for this to which she agreed

## 2019-07-26 NOTE — PROGRESS NOTES
07/26/19 0900   Team Meeting   Meeting Type Daily Rounds   Team Members Present   Team Members Present Nurse;; Other (Discipline and Name)   Nursing Team Member Lyndsey Olivas RN   Care Management Team Member Brenda Rendon   Other (Discipline and Name) Libert[place] Ferrer, [place]; SHANIKA Kent     Very resistive to staff on dayshift  Bathed at sink  Somatic and preoccupied about breathing but not doing anything to help her breathing  Slept

## 2019-07-26 NOTE — TREATMENT PLAN
Treatment plan was reviewed with patient  Patient remembered this therapist by name from her assessment  She shared concerns that there is no disabled shower on the unit, and that she is fearful to take a shower  Patient was resistant to signing plan, especially since it noted irritability, smoking cessation, and impulse control interventions  Patient was especially upset about the "no smoking whole on oxygen," denying that she had ever done this  Patient was pleasant for the most part and was assured there would be no penalty would be incurred by her not signing the plan  Patient's requested changes were made to the plan

## 2019-07-26 NOTE — PLAN OF CARE
Problem: Alteration in Thoughts and Perception  Goal: Agree to be compliant with medication regime, as prescribed and report medication side effects  Description  Interventions:  - Offer appropriate PRN medication and supervise ingestion; conduct aims, as needed   Outcome: Progressing  Goal: Complete daily ADLs, including personal hygiene independently, as able  Description  Interventions:  - Observe, teach, and assist patient with ADLS  - Monitor and promote a balance of rest/activity, with adequate nutrition and elimination   Outcome: Progressing     Problem: Depression  Goal: Refrain from self-neglect  Outcome: Progressing     Problem: RESPIRATORY - ADULT  Goal: Achieves optimal ventilation and oxygenation  Description  INTERVENTIONS:  - Assess for changes in respiratory status  - Assess for changes in mentation and behavior  - Position to facilitate oxygenation and minimize respiratory effort  - Oxygen administration by appropriate delivery method based on oxygen saturation (per order) or ABGs  - Initiate smoking cessation education as indicated  - Encourage broncho-pulmonary hygiene including cough, deep breathe, Incentive Spirometry  - Assess the need for suctioning and aspirate as needed  - Assess and instruct to report SOB or any respiratory difficulty  - Respiratory Therapy support as indicated  Outcome: Progressing     Problem: Alteration in Thoughts and Perception  Goal: Attend and participate in unit activities, including therapeutic, recreational, and educational groups  Description  Interventions:  - Provide therapeutic and educational activities daily, encourage attendance and participation, and document same in the medical record   Outcome: Not Progressing     Problem: Depression  Goal: Refrain from isolation  Description  Interventions:  - Develop a trusting relationship   - Encourage socialization   Outcome: Not Progressing     [address] is reluctant to leave her room & bed where she perches on the side  She is somatic & cites her breathing as precluding most activities  Ate poorly; 25% only @ meal  At 1852 feeling "light headed" & wanted accu check done which was 116  Was encouraged to be more active  This shift out for meal, to use phone  Did bathe @ sink wearing her O2 & sitting on a chair, using basin  Came out for HS medicine to window when prompted  With staff accompanying her & offering encouragement, did walk to snack line, wait for snack, carry same to dining room where ate it  Surprised herself w/this accomplishment  "I did it!" Did not attend Women[facility] Group or PM Group  Is now wearing her nasal O2 @ 1L via cannula/compressor for HS

## 2019-07-27 PROCEDURE — 99231 SBSQ HOSP IP/OBS SF/LOW 25: CPT | Performed by: NURSE PRACTITIONER

## 2019-07-27 RX ADMIN — PANTOPRAZOLE SODIUM 40 MG: 40 TABLET, DELAYED RELEASE ORAL at 06:05

## 2019-07-27 RX ADMIN — FLUOXETINE 10 MG: 10 CAPSULE ORAL at 08:56

## 2019-07-27 RX ADMIN — LEVOTHYROXINE SODIUM 125 MCG: 125 TABLET ORAL at 06:04

## 2019-07-27 RX ADMIN — THEOPHYLLINE ANHYDROUS 200 MG: 200 CAPSULE, EXTENDED RELEASE ORAL at 08:56

## 2019-07-27 RX ADMIN — CLOZAPINE 100 MG: 100 TABLET ORAL at 20:48

## 2019-07-27 RX ADMIN — FLUTICASONE FUROATE AND VILANTEROL TRIFENATATE 1 PUFF: 200; 25 POWDER RESPIRATORY (INHALATION) at 08:56

## 2019-07-27 NOTE — PLAN OF CARE
Problem: Alteration in Thoughts and Perception  Goal: Treatment Goal: Gain control of psychotic behaviors/thinking, reduce/eliminate presenting symptoms and demonstrate improved reality functioning upon discharge  Outcome: Progressing  Goal: Verbalize thoughts and feelings  Description  Interventions:  - Promote a nonjudgmental and trusting relationship with the patient through active listening and therapeutic communication  - Assess patient's level of functioning, behavior and potential for risk  - Engage patient in 1 on 1 interactions for a minimum of 15 minutes each session  - Encourage patient to express fears, feelings, frustrations, and discuss symptoms    - [place] patient to reality, help patient recognize reality-based thinking   - Administer medications as ordered and assess for potential side effects  - Provide the patient education related to the signs and symptoms of the illness and desired effects of prescribed medications  Outcome: Progressing  Goal: Agree to be compliant with medication regime, as prescribed and report medication side effects  Description  Interventions:  - Offer appropriate PRN medication and supervise ingestion; conduct aims, as needed   Outcome: Progressing  Goal: Attend and participate in unit activities, including therapeutic, recreational, and educational groups  Description  Interventions:  - Provide therapeutic and educational activities daily, encourage attendance and participation, and document same in the medical record   Outcome: Progressing  Goal: Recognize dysfunctional thoughts, communicate reality-based thoughts at the time of discharge  Description  Interventions:  - Provide medication and psycho-education to assist patient in compliance and developing insight into his/her illness   Outcome: Progressing  Goal: Complete daily ADLs, including personal hygiene independently, as able  Description  Interventions:  - Observe, teach, and assist patient with ADLS  - Monitor and promote a balance of rest/activity, with adequate nutrition and elimination   Outcome: Progressing     Problem: Ineffective Coping  Goal: Identifies ineffective coping skills  Outcome: Progressing  Goal: Identifies healthy coping skills  Outcome: Progressing  Goal: Demonstrates healthy coping skills  Outcome: Progressing  Goal: Participates in unit activities  Description  Interventions:  - Provide therapeutic environment   - Provide required programming   - Redirect inappropriate behaviors   Outcome: Progressing  Goal: Patient/Family participate in treatment and DC plans  Description  Interventions:  - Provide therapeutic environment  Outcome: Progressing  Goal: Patient/Family verbalizes awareness of resources  Outcome: Progressing  Goal: Understands least restrictive measures  Description  Interventions:  - Utilize least restrictive behavior  Outcome: Progressing     Problem: Risk for Self Injury/Neglect  Goal: Treatment Goal: Remain safe during length of stay, learn and adopt new coping skills, and be free of self-injurious ideation, impulses and acts at the time of discharge  Outcome: Progressing  Goal: Verbalize thoughts and feelings  Description  Interventions:  - Assess and re-assess patient's lethality and potential for self-injury  - Engage patient in 1:1 interactions, daily, for a minimum of 15 minutes  - Encourage patient to express feelings, fears, frustrations, hopes  - Establish rapport/trust with patient   Outcome: Progressing  Goal: Refrain from harming self  Description  Interventions:  - Monitor patient closely, per order  - Develop a trusting relationship  - Supervise medication ingestion, monitor effects and side effects   Outcome: Progressing  Goal: Attend and participate in unit activities, including therapeutic, recreational, and educational groups  Description  Interventions:  - Provide therapeutic and educational activities daily, encourage attendance and participation, and document same in the medical record  - Obtain collateral information, encourage visitation and family involvement in care   Outcome: Progressing  Goal: Recognize maladaptive responses and adopt new coping mechanisms  Outcome: Progressing  Goal: Complete daily ADLs, including personal hygiene independently, as able  Description  Interventions:  - Observe, teach, and assist patient with ADLS  - Monitor and promote a balance of rest/activity, with adequate nutrition and elimination  Outcome: Progressing     Problem: Depression  Goal: Treatment Goal: Demonstrate behavioral control of depressive symptoms, verbalize feelings of improved mood/affect, and adopt new coping skills prior to discharge  Outcome: Progressing  Goal: Verbalize thoughts and feelings  Description  Interventions:  - Assess and re-assess patient's level of risk   - Engage patient in 1:1 interactions, daily, for a minimum of 15 minutes   - Encourage patient to express feelings, fears, frustrations, hopes   Outcome: Progressing  Goal: Refrain from isolation  Description  Interventions:  - Develop a trusting relationship   - Encourage socialization   Outcome: Progressing  Goal: Refrain from self-neglect  Outcome: Progressing     Problem: Anxiety  Goal: Anxiety is at manageable level  Description  Interventions:  - Assess and monitor patient's anxiety level  - Monitor for signs and symptoms of anxiety both physical and emotional (heart palpitations, chest pain, shortness of breath, headaches, nausea, feeling jumpy, restlessness, irritable, apprehensive)  - Collaborate with interdisciplinary team and initiate plan and interventions as ordered    - [place] patient to unit/surroundings  - Explain treatment plan  - Encourage participation in care  - Encourage verbalization of concerns/fears  - Identify coping mechanisms  - Assist in developing anxiety-reducing skills  - Administer/offer alternative therapies  - Limit or eliminate stimulants  Outcome: Progressing Problem: Alteration in Orientation  Goal: Interact with staff daily  Description  Interventions:  - Assess and re-assess patient's level of orientation  - Engage patient in 1 on 1 interactions, daily, for a minimum of 15 minutes   - Establish rapport/trust with patient   Outcome: Progressing  Goal: Cooperate with recommended testing/procedures  Description  Interventions:  - Determine need for ancillary testing  - Observe for mental status changes  - Implement falls/precaution protocol   Outcome: Progressing     Problem: PAIN - ADULT  Goal: Verbalizes/displays adequate comfort level or baseline comfort level  Description  Interventions:  - Encourage patient to monitor pain and request assistance  - Assess pain using appropriate pain scale  - Administer analgesics based on type and severity of pain and evaluate response  - Implement non-pharmacological measures as appropriate and evaluate response  - Consider cultural and social influences on pain and pain management  - Notify physician/advanced practitioner if interventions unsuccessful or patient reports new pain  Outcome: Progressing     Problem: SAFETY ADULT  Goal: Patient will remain free of falls  Description  INTERVENTIONS:  - Assess patient frequently for physical needs  -  Identify cognitive and physical deficits and behaviors that affect risk of falls    -  [place] fall precautions as indicated by assessment   - Educate patient/family on patient safety including physical limitations  - Instruct patient to call for assistance with activity based on assessment  - Modify environment to reduce risk of injury  - Consider OT/PT consult to assist with strengthening/mobility  Outcome: Progressing     Problem: RESPIRATORY - ADULT  Goal: Achieves optimal ventilation and oxygenation  Description  INTERVENTIONS:  - Assess for changes in respiratory status  - Assess for changes in mentation and behavior  - Position to facilitate oxygenation and minimize respiratory effort  - Oxygen administration by appropriate delivery method based on oxygen saturation (per order) or ABGs  - Initiate smoking cessation education as indicated  - Encourage broncho-pulmonary hygiene including cough, deep breathe, Incentive Spirometry  - Assess the need for suctioning and aspirate as needed  - Assess and instruct to report SOB or any respiratory difficulty  - Respiratory Therapy support as indicated  Outcome: Progressing    7/27/19 Shift 7-3  BM 7/27 Sink Bath 7/27 with supervision, Did not attend groups  Out of bed for meds and meals  Remained out of bed 1 hour after lunch then returned back to room and bed  Ate 25% of breakfast and 75% of lunch  Very poor motivation  Patient attempts to get staff to do everything for her and will yell from room to talk to staff instead of getting out of bed and approaching staff to make needs known  Patient is instructed to best attend to her own needs she must get out of bed and approach staff  The expectation that staff will do everything for her is not a reasonable part of her recovery process  Patient does verbalize understanding however, she continues to act in the same manner  She remains in bed and roommate reports that patient is having difficulty with toileting I and is leaving bathroom a mess  This will be investigated further and addressed independently

## 2019-07-27 NOTE — PLAN OF CARE
Problem: Alteration in Thoughts and Perception  Goal: Agree to be compliant with medication regime, as prescribed and report medication side effects  Description  Interventions:  - Offer appropriate PRN medication and supervise ingestion; conduct aims, as needed   Outcome: Progressing     Problem: RESPIRATORY - ADULT  Goal: Achieves optimal ventilation and oxygenation  Description  INTERVENTIONS:  - Assess for changes in respiratory status  - Assess for changes in mentation and behavior  - Position to facilitate oxygenation and minimize respiratory effort  - Oxygen administration by appropriate delivery method based on oxygen saturation (per order) or ABGs  - Initiate smoking cessation education as indicated  - Encourage broncho-pulmonary hygiene including cough, deep breathe, Incentive Spirometry  - Assess the need for suctioning and aspirate as needed  - Assess and instruct to report SOB or any respiratory difficulty  - Respiratory Therapy support as indicated  Outcome: Progressing     Problem: Alteration in Thoughts and Perception  Goal: Attend and participate in unit activities, including therapeutic, recreational, and educational groups  Description  Interventions:  - Provide therapeutic and educational activities daily, encourage attendance and participation, and document same in the medical record   Outcome: Not Progressing     Problem: Ineffective Coping  Goal: Demonstrates healthy coping skills  Outcome: Not Progressing     [address] is isolative to her room, coming out only when called for meal (ate 30%), for HS medicine @ window, to get HS snack w/staff accompaniment & encouragement  She is pleasant, but, continues overwhelmed thinking about whether can manage the program here due to her breathing  Refers to a Treatment Plan she has been shown by the Therapist & says refused to sign  Was given encouragement & reassurance that can learn ways to function within this setting   Discussed options for showering, shampooing for one example  Did not attend PM Group  Her nasal O2 @ 1L applied @ HS via cannula/compressor

## 2019-07-27 NOTE — PROGRESS NOTES
Progress Note - Behavioral Health   Joseph Rodriguez 60 y o  female MRN: [id number]  [facility] Encounter: [id number]    The patient was seen for continuing care and reviewed with treatment team   Staff reports no significant change within the past 24 hours  Patient was observed sitting in the dining room eating breakfast being visible and social with peers  She currently rates her depression as high"for with moderate anxiety noted  Patient is reporting good sleep and appetite but decreased energy  Patient states they continue to wean her off her O2 and she feels as though she has difficulty breathing when she is walking the halls and exerting herself or standing in the shower attempting to bathe  She is currently denying any suicidal/homicidal ideations or auditory/visual hallucinations and does not appear to be responding to his internal stimuli at this time  She is compliant with her medications and no side effects noted at this time  Denies pain  Mental Status Evaluation:  Appearance:  Adequate hygiene and grooming   Behavior:  calm, cooperative and friendly   Mood:  anxious   Affect: constricted   Speech: Normal rate and Normal volume   Thought Process:  Goal directed and coherent   Thought Content:  Does not verbalize delusional material   Perceptual Disturbances: Denies hallucinations and does not appear to be responding to internal stimuli   Risk Potential: No suicidal or homicidal ideation   Attention/Concentration attention span and concentration were age appropriate   Orientation:   Alert and  awake   Gait/Station: Not observed   Motor Activity: No abnormal movement noted     Progress Toward Goals:  No change    Assessment/Plan    Principal Problem:    Schizoaffective disorder, bipolar type (HCC)  Active Problems:    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[address] )    Acquired hypothyroidism    Gastroesophageal reflux disease without esophagitis      Recommended Treatment:   1   Continue with pharmacotherapy, group therapy, milieu therapy and occupational therapy  2  No medication changes at this time  The patient will be maintained on the following medications:    Current Facility-Administered Medications:  acetaminophen 650 mg Oral Q6H PRN Allie Guzman MD   acetaminophen 650 mg Oral Q6H PRN Allie Guzman, MD   acetaminophen 650 mg Oral Q6H PRN Allie Guzman MD   albuterol 2 puff Inhalation Q4H PRN Allie Guzman MD   benzonatate 100 mg Oral TID PRN Allie Guzman MD   cloZAPine 100 mg Oral HS Allie Guzman MD   FLUoxetine 10 mg Oral Daily Allie Guzman MD   fluticasone-vilanterol 1 puff Inhalation Daily Allie Guzman MD   levothyroxine 125 mcg Oral Early Morning Allie Guzman MD   OLANZapine 5 mg Oral Q8H PRN Allie Guzman MD   pantoprazole 40 mg Oral Early Morning Allie Guzman MD   theophylline 200 mg Oral Daily Allie Guzman MD   traZODone 25 mg Oral HS PRN Allie Guzman MD       Risks, benefits and possible side effects of Medications:   Risks, benefits, and possible side effects of medications explained to patient and patient verbalizes understanding  ABILIO Sandoval      Portions of the record may have been created with voice recognition software  Occasional wrong word or "sound a like" substitutions may have occurred due to the inherent limitations of voice recognition software  Read the chart carefully and recognize, using context, where substitutions have occurred

## 2019-07-27 NOTE — PLAN OF CARE
Problem: SAFETY ADULT  Goal: Patient will remain free of falls  Description  INTERVENTIONS:  - Assess patient frequently for physical needs  -  Identify cognitive and physical deficits and behaviors that affect risk of falls  -  [place] fall precautions as indicated by assessment   - Educate patient/family on patient safety including physical limitations  - Instruct patient to call for assistance with activity based on assessment  - Modify environment to reduce risk of injury  - Consider OT/PT consult to assist with strengthening/mobility  Outcome: Progressing     Problem: RESPIRATORY - ADULT  Goal: Achieves optimal ventilation and oxygenation  Description  INTERVENTIONS:  - Assess for changes in respiratory status  - Assess for changes in mentation and behavior  - Position to facilitate oxygenation and minimize respiratory effort  - Oxygen administration by appropriate delivery method based on oxygen saturation (per order) or ABGs  - Initiate smoking cessation education as indicated  - Encourage broncho-pulmonary hygiene including cough, deep breathe, Incentive Spirometry  - Assess the need for suctioning and aspirate as needed  - Assess and instruct to report SOB or any respiratory difficulty  - Respiratory Therapy support as indicated  Outcome: Progressing   Pt quietly resting thru the night , eyes closed,chest noted to be rising ,audible breath sounds will monitor for change in behavior/assessment with q 15 min safety rounds made thru the night  No behaviors,Ox @ 1 l/min via n/c, while sleeping, in bed, no s/s of respiratory distress, no apneic episodes, will monitor for  change in behavior/assessment

## 2019-07-28 PROCEDURE — 99231 SBSQ HOSP IP/OBS SF/LOW 25: CPT | Performed by: NURSE PRACTITIONER

## 2019-07-28 RX ADMIN — FLUOXETINE 10 MG: 10 CAPSULE ORAL at 08:22

## 2019-07-28 RX ADMIN — THEOPHYLLINE ANHYDROUS 200 MG: 200 CAPSULE, EXTENDED RELEASE ORAL at 08:21

## 2019-07-28 RX ADMIN — LEVOTHYROXINE SODIUM 125 MCG: 125 TABLET ORAL at 06:04

## 2019-07-28 RX ADMIN — CLOZAPINE 100 MG: 100 TABLET ORAL at 20:34

## 2019-07-28 RX ADMIN — FLUTICASONE FUROATE AND VILANTEROL TRIFENATATE 1 PUFF: 200; 25 POWDER RESPIRATORY (INHALATION) at 08:45

## 2019-07-28 RX ADMIN — PANTOPRAZOLE SODIUM 40 MG: 40 TABLET, DELAYED RELEASE ORAL at 06:04

## 2019-07-28 NOTE — PLAN OF CARE
Problem: Alteration in Thoughts and Perception  Goal: Treatment Goal: Gain control of psychotic behaviors/thinking, reduce/eliminate presenting symptoms and demonstrate improved reality functioning upon discharge  Outcome: Progressing  Goal: Verbalize thoughts and feelings  Description  Interventions:  - Promote a nonjudgmental and trusting relationship with the patient through active listening and therapeutic communication  - Assess patient's level of functioning, behavior and potential for risk  - Engage patient in 1 on 1 interactions for a minimum of 15 minutes each session  - Encourage patient to express fears, feelings, frustrations, and discuss symptoms    - [place] patient to reality, help patient recognize reality-based thinking   - Administer medications as ordered and assess for potential side effects  - Provide the patient education related to the signs and symptoms of the illness and desired effects of prescribed medications  Outcome: Progressing  Goal: Agree to be compliant with medication regime, as prescribed and report medication side effects  Description  Interventions:  - Offer appropriate PRN medication and supervise ingestion; conduct aims, as needed   Outcome: Progressing  Goal: Attend and participate in unit activities, including therapeutic, recreational, and educational groups  Description  Interventions:  - Provide therapeutic and educational activities daily, encourage attendance and participation, and document same in the medical record   Outcome: Progressing  Goal: Recognize dysfunctional thoughts, communicate reality-based thoughts at the time of discharge  Description  Interventions:  - Provide medication and psycho-education to assist patient in compliance and developing insight into his/her illness   Outcome: Progressing  Goal: Complete daily ADLs, including personal hygiene independently, as able  Description  Interventions:  - Observe, teach, and assist patient with ADLS  - Monitor and promote a balance of rest/activity, with adequate nutrition and elimination   Outcome: Progressing     Problem: Ineffective Coping  Goal: Identifies ineffective coping skills  Outcome: Progressing  Goal: Identifies healthy coping skills  Outcome: Progressing  Goal: Demonstrates healthy coping skills  Outcome: Progressing  Goal: Participates in unit activities  Description  Interventions:  - Provide therapeutic environment   - Provide required programming   - Redirect inappropriate behaviors   Outcome: Progressing  Goal: Patient/Family participate in treatment and DC plans  Description  Interventions:  - Provide therapeutic environment  Outcome: Progressing  Goal: Patient/Family verbalizes awareness of resources  Outcome: Progressing  Goal: Understands least restrictive measures  Description  Interventions:  - Utilize least restrictive behavior  Outcome: Progressing     Problem: Risk for Self Injury/Neglect  Goal: Treatment Goal: Remain safe during length of stay, learn and adopt new coping skills, and be free of self-injurious ideation, impulses and acts at the time of discharge  Outcome: Progressing  Goal: Verbalize thoughts and feelings  Description  Interventions:  - Assess and re-assess patient's lethality and potential for self-injury  - Engage patient in 1:1 interactions, daily, for a minimum of 15 minutes  - Encourage patient to express feelings, fears, frustrations, hopes  - Establish rapport/trust with patient   Outcome: Progressing  Goal: Refrain from harming self  Description  Interventions:  - Monitor patient closely, per order  - Develop a trusting relationship  - Supervise medication ingestion, monitor effects and side effects   Outcome: Progressing  Goal: Attend and participate in unit activities, including therapeutic, recreational, and educational groups  Description  Interventions:  - Provide therapeutic and educational activities daily, encourage attendance and participation, and document same in the medical record  - Obtain collateral information, encourage visitation and family involvement in care   Outcome: Progressing  Goal: Recognize maladaptive responses and adopt new coping mechanisms  Outcome: Progressing  Goal: Complete daily ADLs, including personal hygiene independently, as able  Description  Interventions:  - Observe, teach, and assist patient with ADLS  - Monitor and promote a balance of rest/activity, with adequate nutrition and elimination  Outcome: Progressing     Problem: Depression  Goal: Treatment Goal: Demonstrate behavioral control of depressive symptoms, verbalize feelings of improved mood/affect, and adopt new coping skills prior to discharge  Outcome: Progressing  Goal: Verbalize thoughts and feelings  Description  Interventions:  - Assess and re-assess patient's level of risk   - Engage patient in 1:1 interactions, daily, for a minimum of 15 minutes   - Encourage patient to express feelings, fears, frustrations, hopes   Outcome: Progressing  Goal: Refrain from isolation  Description  Interventions:  - Develop a trusting relationship   - Encourage socialization   Outcome: Progressing  Goal: Refrain from self-neglect  Outcome: Progressing     Problem: Anxiety  Goal: Anxiety is at manageable level  Description  Interventions:  - Assess and monitor patient's anxiety level  - Monitor for signs and symptoms of anxiety both physical and emotional (heart palpitations, chest pain, shortness of breath, headaches, nausea, feeling jumpy, restlessness, irritable, apprehensive)  - Collaborate with interdisciplinary team and initiate plan and interventions as ordered    - [place] patient to unit/surroundings  - Explain treatment plan  - Encourage participation in care  - Encourage verbalization of concerns/fears  - Identify coping mechanisms  - Assist in developing anxiety-reducing skills  - Administer/offer alternative therapies  - Limit or eliminate stimulants  Outcome: Progressing Problem: Alteration in Orientation  Goal: Interact with staff daily  Description  Interventions:  - Assess and re-assess patient's level of orientation  - Engage patient in 1 on 1 interactions, daily, for a minimum of 15 minutes   - Establish rapport/trust with patient   Outcome: Progressing  Goal: Cooperate with recommended testing/procedures  Description  Interventions:  - Determine need for ancillary testing  - Observe for mental status changes  - Implement falls/precaution protocol   Outcome: Progressing     Problem: PAIN - ADULT  Goal: Verbalizes/displays adequate comfort level or baseline comfort level  Description  Interventions:  - Encourage patient to monitor pain and request assistance  - Assess pain using appropriate pain scale  - Administer analgesics based on type and severity of pain and evaluate response  - Implement non-pharmacological measures as appropriate and evaluate response  - Consider cultural and social influences on pain and pain management  - Notify physician/advanced practitioner if interventions unsuccessful or patient reports new pain  Outcome: Progressing     Problem: SAFETY ADULT  Goal: Patient will remain free of falls  Description  INTERVENTIONS:  - Assess patient frequently for physical needs  -  Identify cognitive and physical deficits and behaviors that affect risk of falls    -  [place] fall precautions as indicated by assessment   - Educate patient/family on patient safety including physical limitations  - Instruct patient to call for assistance with activity based on assessment  - Modify environment to reduce risk of injury  - Consider OT/PT consult to assist with strengthening/mobility  Outcome: Progressing     Problem: RESPIRATORY - ADULT  Goal: Achieves optimal ventilation and oxygenation  Description  INTERVENTIONS:  - Assess for changes in respiratory status  - Assess for changes in mentation and behavior  - Position to facilitate oxygenation and minimize respiratory effort  - Oxygen administration by appropriate delivery method based on oxygen saturation (per order) or ABGs  - Initiate smoking cessation education as indicated  - Encourage broncho-pulmonary hygiene including cough, deep breathe, Incentive Spirometry  - Assess the need for suctioning and aspirate as needed  - Assess and instruct to report SOB or any respiratory difficulty  - Respiratory Therapy support as indicated  Outcome: Progressing      7/28/19 Shift 7-3   7/27, [facility] 7/28 with supervision, Did not attend groups  Out of bed for meds and meals  Ate 100% of both measl  Patient refuses activities because she is "recouperating" from her shower  Patient sat on small commode in shower stall and completed task of washing hair and body with minimal assist  Staff was present for supervision and patient safety  Patient completed task without PRN O2 and had oxygen saturation level of 92% on room air at completion  No c/o s/s pain, discomfort or distress  Continues to require multiple reminders and reassurance that she can do things for herself and they will not be done for her

## 2019-07-28 NOTE — PROGRESS NOTES
Progress Note - Behavioral Health   Efraín Sizer 60 y o  female MRN: [id number]  [facility] Encounter: [id number]    The patient was seen for continuing care and reviewed with treatment team   Staff reports no significant change in the past 24 hours  Patient was observed eating breakfast in dining room visible and social with peers  She is currently rating her depression as high"due to not being allowed to have continuous oxygen and moderate anxiety was noted  Patient continues to be upset that she has to shower without her O2 and was also agitated that she is being asked to walk around the unit without her O2 tank  Explained to patient these are steps to help her get strong her and to become more independent so she can be discharged  The patient stated she feels that she is being abused on the unit because she is being refused her oxygen  Patient was reassured that is not the case and that oxygen tanks are present on every unit, however, at this time it is important for her to continue to work toward discharge and getting herself stronger  Patient seemed okay with that explanation but after leaving the dining room continue to verbalize her frustration about not having her oxygen  She is currently reporting good sleep and appetite  She denies any suicidal/homicidal ideations or auditory/visual hallucinations and does not appear to be responding to internal stimuli at this time  She is compliant with her medications and no side effects noted at this time  Denies pain      Mental Status Evaluation:  Appearance:  Adequate hygiene and grooming   Behavior:  cooperative   Mood:  irritable   Affect: constricted   Speech: Normal rate and Normal volume   Thought Process:  Circumstantial   Thought Content:  Does not verbalize delusional material   Perceptual Disturbances: Denies hallucinations and does not appear to be responding to internal stimuli   Risk Potential: No suicidal or homicidal ideation Attention/Concentration attention span and concentration were age appropriate   Orientation:   Alert and awake   Gait/Station: Not observed   Motor Activity: No abnormal movement noted     Progress Toward Goals:  No change    Assessment/Plan    Principal Problem:    Schizoaffective disorder, bipolar type (HCC)  Active Problems:    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[facility] [address] )    Acquired hypothyroidism    Gastroesophageal reflux disease without esophagitis      Recommended Treatment:   1  Continue with pharmacotherapy, group therapy, milieu therapy and occupational therapy  2  Continue with safety standards per unit protocol  3  No medication changes at this time  The patient will be maintained on the following medications:    Current Facility-Administered Medications:  acetaminophen 650 mg Oral Q6H PRN Sindy Day MD   acetaminophen 650 mg Oral Q6H PRN Sindy Day MD   acetaminophen 650 mg Oral Q6H PRN Sindy Day MD   albuterol 2 puff Inhalation Q4H PRN Sindy Day MD   benzonatate 100 mg Oral TID PRN Sindy Day MD   cloZAPine 100 mg Oral HS Sindy Day MD   FLUoxetine 10 mg Oral Daily Sindy Day MD   fluticasone-vilanterol 1 puff Inhalation Daily Sindy Day MD   levothyroxine 125 mcg Oral Early Morning Sindy Day MD   OLANZapine 5 mg Oral Q8H PRN Sindy Day MD   pantoprazole 40 mg Oral Early Morning Sindy Day MD   theophylline 200 mg Oral Daily Sindy Day MD   traZODone 25 mg Oral HS PRN Sindy Day MD       Risks, benefits and possible side effects of Medications:   Risks, benefits, and possible side effects of medications explained to patient and patient verbalizes understanding  ABILIO Howe      Portions of the record may have been created with voice recognition software  Occasional wrong word or "sound a like" substitutions may have occurred due to the inherent limitations of voice recognition software   Read the chart carefully and recognize, using context, where substitutions have occurred

## 2019-07-28 NOTE — PLAN OF CARE
Problem: Alteration in Thoughts and Perception  Goal: Treatment Goal: Gain control of psychotic behaviors/thinking, reduce/eliminate presenting symptoms and demonstrate improved reality functioning upon discharge  Outcome: Progressing as per chart review    Received pt in bed at change of shift with eyes closed; chest movement noted  Continues the same thus this far as per q 15 min room checks  Will continue to monitor

## 2019-07-28 NOTE — PLAN OF CARE
Problem: Alteration in Thoughts and Perception  Goal: Agree to be compliant with medication regime, as prescribed and report medication side effects  Description  Interventions:  - Offer appropriate PRN medication and supervise ingestion; conduct aims, as needed   Outcome: Progressing  Goal: Complete daily ADLs, including personal hygiene independently, as able  Description  Interventions:  - Observe, teach, and assist patient with ADLS  - Monitor and promote a balance of rest/activity, with adequate nutrition and elimination   Outcome: Progressing     Problem: Risk for Self Injury/Neglect  Goal: Refrain from harming self  Description  Interventions:  - Monitor patient closely, per order  - Develop a trusting relationship  - Supervise medication ingestion, monitor effects and side effects   Outcome: Progressing  Goal: Complete daily ADLs, including personal hygiene independently, as able  Description  Interventions:  - Observe, teach, and assist patient with ADLS  - Monitor and promote a balance of rest/activity, with adequate nutrition and elimination  Outcome: Progressing     Problem: Depression  Goal: Refrain from self-neglect  Outcome: Progressing     Problem: Anxiety  Goal: Anxiety is at manageable level  Description  Interventions:  - Assess and monitor patient's anxiety level  - Monitor for signs and symptoms of anxiety both physical and emotional (heart palpitations, chest pain, shortness of breath, headaches, nausea, feeling jumpy, restlessness, irritable, apprehensive)  - Collaborate with interdisciplinary team and initiate plan and interventions as ordered    - [place] patient to unit/surroundings  - Explain treatment plan  - Encourage participation in care  - Encourage verbalization of concerns/fears  - Identify coping mechanisms  - Assist in developing anxiety-reducing skills  - Administer/offer alternative therapies  - Limit or eliminate stimulants  Outcome: Progressing     Problem: Alteration in Orientation  Goal: Interact with staff daily  Description  Interventions:  - Assess and re-assess patient's level of orientation  - Engage patient in 1 on 1 interactions, daily, for a minimum of 15 minutes   - Establish rapport/trust with patient   Outcome: Progressing     Problem: SAFETY ADULT  Goal: Patient will remain free of falls  Description  INTERVENTIONS:  - Assess patient frequently for physical needs  -  Identify cognitive and physical deficits and behaviors that affect risk of falls  -  [place] fall precautions as indicated by assessment   - Educate patient/family on patient safety including physical limitations  - Instruct patient to call for assistance with activity based on assessment  - Modify environment to reduce risk of injury  - Consider OT/PT consult to assist with strengthening/mobility  Outcome: Progressing     Problem: RESPIRATORY - ADULT  Goal: Achieves optimal ventilation and oxygenation  Description  INTERVENTIONS:  - Assess for changes in respiratory status  - Assess for changes in mentation and behavior  - Position to facilitate oxygenation and minimize respiratory effort  - Oxygen administration by appropriate delivery method based on oxygen saturation (per order) or ABGs  - Initiate smoking cessation education as indicated  - Encourage broncho-pulmonary hygiene including cough, deep breathe, Incentive Spirometry  - Assess the need for suctioning and aspirate as needed  - Assess and instruct to report SOB or any respiratory difficulty  - Respiratory Therapy support as indicated  Outcome: Josué Hussein has been in her room most of the shift  At times she will come out and sit in the dining room  Social with select peers  Able to make needs known to staff  Her sister was here at the beginning of the shift  Visit appeared to go well  Ate 50% of her meal  Encouraged to sit up after her meal  Did not attend group  Took HS medication without difficulty  Ate snack   Came out to discuss concerns about taking a shower tomorrow  Worried about "drowning in the shower " It was explained to her that staff will assist her  Continue to monitor  Precautions maintained

## 2019-07-29 PROCEDURE — 99232 SBSQ HOSP IP/OBS MODERATE 35: CPT | Performed by: PSYCHIATRY & NEUROLOGY

## 2019-07-29 RX ADMIN — FLUOXETINE 10 MG: 10 CAPSULE ORAL at 08:48

## 2019-07-29 RX ADMIN — CLOZAPINE 100 MG: 100 TABLET ORAL at 20:46

## 2019-07-29 RX ADMIN — FLUTICASONE FUROATE AND VILANTEROL TRIFENATATE 1 PUFF: 200; 25 POWDER RESPIRATORY (INHALATION) at 08:48

## 2019-07-29 RX ADMIN — LEVOTHYROXINE SODIUM 125 MCG: 125 TABLET ORAL at 06:03

## 2019-07-29 RX ADMIN — THEOPHYLLINE ANHYDROUS 200 MG: 200 CAPSULE, EXTENDED RELEASE ORAL at 08:48

## 2019-07-29 RX ADMIN — PANTOPRAZOLE SODIUM 40 MG: 40 TABLET, DELAYED RELEASE ORAL at 06:03

## 2019-07-29 NOTE — PLAN OF CARE
Problem: PAIN - ADULT  Goal: Verbalizes/displays adequate comfort level or baseline comfort level  Description  Interventions:  - Encourage patient to monitor pain and request assistance  - Assess pain using appropriate pain scale  - Administer analgesics based on type and severity of pain and evaluate response  - Implement non-pharmacological measures as appropriate and evaluate response  - Consider cultural and social influences on pain and pain management  - Notify physician/advanced practitioner if interventions unsuccessful or patient reports new pain  Outcome: Progressing     Problem: SAFETY ADULT  Goal: Patient will remain free of falls  Description  INTERVENTIONS:  - Assess patient frequently for physical needs  -  Identify cognitive and physical deficits and behaviors that affect risk of falls    -  [place] fall precautions as indicated by assessment   - Educate patient/family on patient safety including physical limitations  - Instruct patient to call for assistance with activity based on assessment  - Modify environment to reduce risk of injury  - Consider OT/PT consult to assist with strengthening/mobility  Outcome: Progressing     Problem: RESPIRATORY - ADULT  Goal: Achieves optimal ventilation and oxygenation  Description  INTERVENTIONS:  - Assess for changes in respiratory status  - Assess for changes in mentation and behavior  - Position to facilitate oxygenation and minimize respiratory effort  - Oxygen administration by appropriate delivery method based on oxygen saturation (per order) or ABGs  - Initiate smoking cessation education as indicated  - Encourage broncho-pulmonary hygiene including cough, deep breathe, Incentive Spirometry  - Assess the need for suctioning and aspirate as needed  - Assess and instruct to report SOB or any respiratory difficulty  - Respiratory Therapy support as indicated  Outcome: Suman Calzada maintained on ongoing aspiration, fall and SAFE precaution without incident on this shift  Laying in bed with eyes closed, breath even and unlabored, with 02 @1l/m via nasal cannula for COPD  SPO2 @98% on 1L/m  No respiratory distress noted  Katty Bowles will continue to remain SAFE and free from fall as her room is across from nurse's station and is visible seen by staff  Q 15 minutes checks continues during rounds  No indication of pain or discomfort noted  Will continue to monitor sleep and behavioral pattern

## 2019-07-29 NOTE — PROGRESS NOTES
07/29/19 0900   Team Meeting   Meeting Type Daily Rounds   Team Members Present   Team Members Present Nurse;; Other (Discipline and Name)   Nursing Team Member Keyona Garcia RN   Care Management Team Member Brenda Vizcarra   Other (Discipline and Name) Moisés [place]; SHANIKA Love     Patient was able to shower over the weekend, but then reported she had no energy to attend the groups  Poor motivation to participate in the recovery program  Slept

## 2019-07-29 NOTE — PROGRESS NOTES
07/29/19 1200   Team Meeting   Meeting Type Tx Team Meeting   Initial Conference Date 07/29/19   Next Conference Date 08/05/19   Team Members Present   Team Members Present Physician;Nurse;; Other (Discipline and Name)   Physician Team Member Dr Surinder Robbins Team Member Ruddy Montoya, LISA   Care Management Team Member Brenda Castaneda   Other (Discipline and Name) Erika Puga [place] - Therapist; Александр Gonsalez, [address] - Certified Peer; Scott Aguillon, [facility] REYES   Patient/Family Present   Patient Present Yes   Patient's Family Present No     Patient attended first treatment team meeting this morning  Patient's group attendance for last week is not properly calculated because patient is a new admission, but she did not attend any groups over the weekend, per report  Patient is fixated on her need for oxygen, though the frequency she reports to need the oxygen is inconsistent with medical testing  Team encouraged patient to participate in programming so that she can learn to cope with anxiety exacerbated by medical issues  Patient reports she is afraid to bathe and needs a shower chair  Team supported patient and encouraged her to work with staff as she becomes of adjusted to the unit and her needs can be fully met  Team and patient completed risk assessment and the patient did not verbalize any desire to elope from the program   Patient verbalized understanding of consequences of eloping from treatment while on a commitment  Patient verbalized no further questions or concerns at the conclusion of the meeting  Next team meeting scheduled for 8/5

## 2019-07-29 NOTE — PROGRESS NOTES
Progress Note - Selina Simon 1955, 60 y o  female MRN: [id number]    Unit/Bed#: [facility] [address] Encounter: [id number]    Primary Care Provider: Erma Morillo MD   Date and time admitted to hospital: 7/23/2019  5:30 PM        * Schizoaffective disorder, bipolar type [facility])  Assessment & Plan  Psychiatry Progress Note    Subjective: Interval History     Patient is not attending any of the groups and prefers to lay back on bed excusing herself for being on nasal oxygen which she says been on for last 9 years and also has a portable unit for Oxygen as well  She refused to sign admission papers unless her sister is present and she was not able to be reached by staff to facilitate this despite attempt by CM and she says sister did visit with her over the wekend  She is still insisting that she cannot breathe or take showers on her own despite staff offering support and assistance even though pulse ox is acceptable for short periods  She is preoccupied with her breathing and appears to have some paranoia about the staff and she is again refusing to take the Atrovent claiming that there is "peanut oil in it  She has been sleeping well and has been using the nasal oxygen at night  She is compliant with medications and also refused blood work yesterday  No current suicidal homicidal thoughts intent or plans reported  She is casually dressed fairly groomed but is guarded suspicious evasive been in denial of her illness  No overt hallucinations reported other than some underlying paranoid delusions and beliefs that there is a micro chip implanted on her left forearm pointing to the 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  She did take a shower yesterday for the first time and she was encouraged to use the handicapped bathroom on the unit          Current medications:    Current Facility-Administered Medications:     acetaminophen (TYLENOL) tablet 650 mg, 650 mg, Oral, Q6H PRN, Greer Beltran MD    acetaminophen (TYLENOL) tablet 650 mg, 650 mg, Oral, Q6H PRN, Christian Bennett MD    acetaminophen (TYLENOL) tablet 650 mg, 650 mg, Oral, Q6H PRN, Christian Bennett MD    albuterol (PROVENTIL HFA,VENTOLIN HFA) inhaler 2 puff, 2 puff, Inhalation, Q4H PRN, Christian Bennett MD, 2 puff at 07/25/19 1200    benzonatate (TESSALON PERLES) capsule 100 mg, 100 mg, Oral, TID PRN, Christian Bennett MD    cloZAPine (CLOZARIL) tablet 100 mg, 100 mg, Oral, HS, Christian Bennett MD, 100 mg at 07/28/19 2034    FLUoxetine (PROzac) capsule 10 mg, 10 mg, Oral, Daily, Christian Bennett MD, 10 mg at 07/29/19 0848    fluticasone-vilanterol (BREO ELLIPTA) 200-25 MCG/INH inhaler 1 puff, 1 puff, Inhalation, Daily, Christian Bennett MD, 1 puff at 07/29/19 0848    levothyroxine tablet 125 mcg, 125 mcg, Oral, Early Morning, Christian Bennett MD, 125 mcg at 07/29/19 0603    OLANZapine (ZyPREXA) tablet 5 mg, 5 mg, Oral, Q8H PRN, Christian Bennett MD    pantoprazole (PROTONIX) EC tablet 40 mg, 40 mg, Oral, Early Morning, Christian Bennett MD, 40 mg at 07/29/19 0603    theophylline (JEF-24) 24 hr capsule 200 mg, 200 mg, Oral, Daily, Christian Bennett MD, 200 mg at 07/29/19 0848    traZODone (DESYREL) tablet 25 mg, 25 mg, Oral, HS PRN, Christian Bennett MD  Justification if on more than two antipsychotics:  Only on his clozapine  Side effects if any:  None    Risks , benefits, side effects and precautions of medications discussed with patient and reminded patient to let us know any problems with medications     Objective:     Vital Signs:  Vitals:    07/28/19 0730 07/28/19 0732 07/29/19 0700 07/29/19 0705   BP: 118/72 106/67 126/73 109/64   BP Location: Left arm Left arm Left arm Left arm   Pulse: 102 (!) 107 102 (!) 107   Resp: 16  18 18   Temp: 97 6 °F (36 4 °C)  97 5 °F (36 4 °C)    TempSrc: Temporal  Temporal    SpO2:       Weight: 66 2 kg (146 lb)      Height:         Appearance:  age appropriate, casually dressed and overweight older than stated age   Behavior:  deviant, evasive and guarded and suspicious but with good eye contact   Speech:  normal pitch and normal volume but tends to become loud at times   Mood:  anxious and dysthymic   Affect:  mood-congruent   Thought Process:  goal directed and illogical slightly pressured but able to be redirected   Thought Content:  delusions  grandiose and somatic about not being able to breathe despite being on nasal oxygen and paranoid about people out to harm her and Atrovent inhaler tainteded with peanut oil  No current suicidal homicidal thoughts intent or plans reported  No phobias obsessions or compulsions reported   Perceptual Disturbances: None and does not appear responding to internal stimuli   Risk Potential: Tendency to not care for herself if she goes off treatment   Sensorium:  person, place, time/date, situation, day of week, month of year and time   Cognition:  grossly intact   Consciousness:  alert and awake    Attention: Intact concentration and attention span   Intellect: Considered to be at least of average intelligence   Insight:  limited in denial   Judgment: poor      Motor Activity: no abnormal movements         Recent Labs:  Results Reviewed     None          I/O Past 24 hours:  No intake/output data recorded  No intake/output data recorded  Assessment / Plan:     [facility]      Reason for continued inpatient care:  Because of underlying paranoia and refusal to go back to the personal care home and inability to care for herself on her own  Acceptance by patient:  Accepting  Teena Bashir in recovery:  About living in another personal care home once she feels better  Understanding of medications :  Has some understanding  Involved in reintegration process:  Adjusting to the unit  Trusting in relatoinship with psychiatrist:  Trusting    Recommended Treatment:    Medication changes:  1)continue the  clozapine and blood work has been acceptable       Non-pharmacological treatments  1) start individual therapy group therapy, milieu therapy and occupational therapy and milieu therapy involving multidisciplinary team approach with psychotherapist, case management, nursing, peer support services, art therapist etc using recovery principles and psycho-education about accepting illness and need for treatment   Safety  1) Safety/communication plan established targeting dynamic risk factors above  Discharge Plan most likely back to the a:  Personal care boarding home with act services once her delusions are managed and mood becomes more stabilized and she becomes more receptive to treatment compliance    Counseling / Coordination of Care    Total floor / unit time spent today 15 minutes  Greater than 50% of total time was spent with the patient and / or family counseling and / or coordination of care  A description of the counseling / coordination of care  Patient's Rights, confidentiality and exceptions to confidentiality, use of automated medical record, [facility] to [facility] record, and consent to treatment reviewed      Sandhya Bolaños MD

## 2019-07-29 NOTE — PLAN OF CARE
Problem: Ineffective Coping  Goal: Participates in unit activities  Description  Interventions:  - Provide therapeutic environment   - Provide required programming   - Redirect inappropriate behaviors   Outcome: Not Progressing     Problem: Risk for Self Injury/Neglect  Goal: Attend and participate in unit activities, including therapeutic, recreational, and educational groups  Description  Interventions:  - Provide therapeutic and educational activities daily, encourage attendance and participation, and document same in the medical record  - Obtain collateral information, encourage visitation and family involvement in care   Outcome: Not Progressing     Problem: Depression  Goal: Refrain from isolation  Description  Interventions:  - Develop a trusting relationship   - Encourage socialization   Outcome: Not Progressing     Problem: Anxiety  Goal: Anxiety is at manageable level  Description  Interventions:  - Assess and monitor patient's anxiety level  - Monitor for signs and symptoms of anxiety both physical and emotional (heart palpitations, chest pain, shortness of breath, headaches, nausea, feeling jumpy, restlessness, irritable, apprehensive)  - Collaborate with interdisciplinary team and initiate plan and interventions as ordered    - [place] patient to unit/surroundings  - Explain treatment plan  - Encourage participation in care  - Encourage verbalization of concerns/fears  - Identify coping mechanisms  - Assist in developing anxiety-reducing skills  - Administer/offer alternative therapies  - Limit or eliminate stimulants  Outcome: Not Progressing     Problem: RESPIRATORY - ADULT  Goal: Achieves optimal ventilation and oxygenation  Description  INTERVENTIONS:  - Assess for changes in respiratory status  - Assess for changes in mentation and behavior  - Position to facilitate oxygenation and minimize respiratory effort  - Oxygen administration by appropriate delivery method based on oxygen saturation (per order) or ABGs  - Initiate smoking cessation education as indicated  - Encourage broncho-pulmonary hygiene including cough, deep breathe, Incentive Spirometry  - Assess the need for suctioning and aspirate as needed  - Assess and instruct to report SOB or any respiratory difficulty  - Respiratory Therapy support as indicated  Outcome: Progressing    7/29/19 Shift 7-3  BM 7/28, 31 Xenia Perdomoite 7/28   Fall Precautions, Patient Safety, Aspiration Risk   SpO2 Stable throughout shift   Compliant with morning medications with prompting  No complaints throughout the shift, anxiety about her oxygen saturation levels alleviated by checking them frequently but she still remains preoccupied about her levels which she says causes her not to be able to attend groups or tend to her ADLs  Appetite remains intact eating 100% of meals  Behaviors remain controlled and appropriate but remained isolative to her room and had minimal interactions with peers  Will continue to monitor

## 2019-07-29 NOTE — PLAN OF CARE
Problem: Alteration in Thoughts and Perception  Goal: Agree to be compliant with medication regime, as prescribed and report medication side effects  Description  Interventions:  - Offer appropriate PRN medication and supervise ingestion; conduct aims, as needed   Outcome: Progressing  Goal: Complete daily ADLs, including personal hygiene independently, as able  Description  Interventions:  - Observe, teach, and assist patient with ADLS  - Monitor and promote a balance of rest/activity, with adequate nutrition and elimination   Outcome: Progressing     Problem: Alteration in Thoughts and Perception  Goal: Attend and participate in unit activities, including therapeutic, recreational, and educational groups  Description  Interventions:  - Provide therapeutic and educational activities daily, encourage attendance and participation, and document same in the medical record   Outcome: Not Progressing     Pt withdrawn to room and self for most of shift  Pt coming out of room for meds and meals with prompting  Pt did not attend any groups  Pt did eat 75% of dinner, compliant with shower today  Pt requested O2 concentrator before bed  97% on 1 L  Pt did shower today  No issues throughout shift  Will continue to monitor

## 2019-07-30 PROCEDURE — 99232 SBSQ HOSP IP/OBS MODERATE 35: CPT | Performed by: PSYCHIATRY & NEUROLOGY

## 2019-07-30 RX ADMIN — FLUTICASONE FUROATE AND VILANTEROL TRIFENATATE 1 PUFF: 200; 25 POWDER RESPIRATORY (INHALATION) at 09:02

## 2019-07-30 RX ADMIN — THEOPHYLLINE ANHYDROUS 200 MG: 200 CAPSULE, EXTENDED RELEASE ORAL at 09:02

## 2019-07-30 RX ADMIN — FLUOXETINE 10 MG: 10 CAPSULE ORAL at 09:02

## 2019-07-30 RX ADMIN — LEVOTHYROXINE SODIUM 125 MCG: 125 TABLET ORAL at 06:09

## 2019-07-30 RX ADMIN — PANTOPRAZOLE SODIUM 40 MG: 40 TABLET, DELAYED RELEASE ORAL at 06:09

## 2019-07-30 RX ADMIN — CLOZAPINE 100 MG: 100 TABLET ORAL at 20:49

## 2019-07-30 NOTE — PLAN OF CARE
Problem: Alteration in Thoughts and Perception  Goal: Verbalize thoughts and feelings  Description  Interventions:  - Promote a nonjudgmental and trusting relationship with the patient through active listening and therapeutic communication  - Assess patient's level of functioning, behavior and potential for risk  - Engage patient in 1 on 1 interactions for a minimum of 15 minutes each session  - Encourage patient to express fears, feelings, frustrations, and discuss symptoms    - [place] patient to reality, help patient recognize reality-based thinking   - Administer medications as ordered and assess for potential side effects  - Provide the patient education related to the signs and symptoms of the illness and desired effects of prescribed medications  Outcome: Progressing  Goal: Agree to be compliant with medication regime, as prescribed and report medication side effects  Description  Interventions:  - Offer appropriate PRN medication and supervise ingestion; conduct aims, as needed   Outcome: Progressing  Goal: Attend and participate in unit activities, including therapeutic, recreational, and educational groups  Description  Interventions:  - Provide therapeutic and educational activities daily, encourage attendance and participation, and document same in the medical record   Outcome: Progressing     Problem: Risk for Self Injury/Neglect  Goal: Verbalize thoughts and feelings  Description  Interventions:  - Assess and re-assess patient's lethality and potential for self-injury  - Engage patient in 1:1 interactions, daily, for a minimum of 15 minutes  - Encourage patient to express feelings, fears, frustrations, hopes  - Establish rapport/trust with patient   Outcome: Progressing  Goal: Refrain from harming self  Description  Interventions:  - Monitor patient closely, per order  - Develop a trusting relationship  - Supervise medication ingestion, monitor effects and side effects   Outcome: Progressing Problem: RESPIRATORY - ADULT  Goal: Achieves optimal ventilation and oxygenation  Description  INTERVENTIONS:  - Assess for changes in respiratory status  - Assess for changes in mentation and behavior  - Position to facilitate oxygenation and minimize respiratory effort  - Oxygen administration by appropriate delivery method based on oxygen saturation (per order) or ABGs  - Initiate smoking cessation education as indicated  - Encourage broncho-pulmonary hygiene including cough, deep breathe, Incentive Spirometry  - Assess the need for suctioning and aspirate as needed  - Assess and instruct to report SOB or any respiratory difficulty  - Respiratory Therapy support as indicated  Outcome: Progressing     7/30/19 Shift 7-3   7/30,  7/28   Attended: IMR  Fall Precautions, Patient Safety, Aspiration Risk   SpO2 Stable throughout shift; not as preoccupied with having it checked   Compliant with morning medications with minimal prompting  Appetite remains intact; pleasant and cooperative during interactions with staff and peers but continues to remain isolative to her room in between medications and meals  No complaints voiced this shift  Will continue to monitor

## 2019-07-30 NOTE — PROGRESS NOTES
Progress Note - Chun Carrillo 1955, 60 y o  female MRN: [id number]    [facility] Encounter: [id number]    Primary Care Provider: Stephania Garcia MD   Date and time admitted to hospital: 7/23/2019  5:30 PM        * Schizoaffective disorder, bipolar type [facility])  Assessment & Plan  Psychiatry Progress Note    Subjective: Interval History     Patient is again not attending any of the groups and prefers to lay back on bed excusing herself for being on nasal oxygen   Her pulse ox has been quite acceptable but she believes that her being on nasal oxygen  hinders her from attending groups despite having the ability to use a portable oxygen tank  She is still preoccupied with the need for oxygen     She refused to sign admission papers unless her sister is present and she was not able to be reached by staff to facilitate this despite attempt by CM   She is still insisting that she cannot breathe or take showers on her own despite staff offering support and assistance even though pulse ox is acceptable most of the time  She is preoccupied with her breathing and appears to have some paranoia about the staff and she is again refusing to take the Atrovent claiming that there is "peanut oil in it  She has been sleeping well and has been using the nasal oxygen at night  She is compliant with medications  No current suicidal homicidal thoughts intent or plans reported  She is casually dressed fairly groomed but is guarded suspicious evasive been in denial of her illness  No overt hallucinations reported other than some underlying paranoid delusions and beliefs that there is a micro chip implanted on her left forearm pointing to the 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    Compliant with medications including the recent increase in clozapine    Current medications:    Current Facility-Administered Medications:     acetaminophen (TYLENOL) tablet 650 mg, 650 mg, Oral, Q6H PRN, MD Magalis Gonsales acetaminophen (TYLENOL) tablet 650 mg, 650 mg, Oral, Q6H PRN, Jeet Levine MD    acetaminophen (TYLENOL) tablet 650 mg, 650 mg, Oral, Q6H PRN, Jeet Levine MD    albuterol (PROVENTIL HFA,VENTOLIN HFA) inhaler 2 puff, 2 puff, Inhalation, Q4H PRN, Jeet Levine MD, 2 puff at 07/25/19 1200    benzonatate (TESSALON PERLES) capsule 100 mg, 100 mg, Oral, TID PRN, Jeet Levine MD    cloZAPine (CLOZARIL) tablet 100 mg, 100 mg, Oral, HS, Jeet Levine MD, 100 mg at 07/29/19 2046    FLUoxetine (PROzac) capsule 10 mg, 10 mg, Oral, Daily, Jeet Levine MD, 10 mg at 07/29/19 0848    fluticasone-vilanterol (BREO ELLIPTA) 200-25 MCG/INH inhaler 1 puff, 1 puff, Inhalation, Daily, Jeet Levine MD, 1 puff at 07/29/19 0848    levothyroxine tablet 125 mcg, 125 mcg, Oral, Early Morning, Jeet Levine MD, 125 mcg at 07/30/19 0609    OLANZapine (ZyPREXA) tablet 5 mg, 5 mg, Oral, Q8H PRN, Jeet Levine MD    pantoprazole (PROTONIX) EC tablet 40 mg, 40 mg, Oral, Early Morning, Jeet Levine MD, 40 mg at 07/30/19 0609    theophylline (JEF-24) 24 hr capsule 200 mg, 200 mg, Oral, Daily, Jeet Levine MD, 200 mg at 07/29/19 0848    traZODone (DESYREL) tablet 25 mg, 25 mg, Oral, HS PRN, Jeet Levine MD  Justification if on more than two antipsychotics:  Only on his clozapine  Side effects if any:  None    Risks , benefits, side effects and precautions of medications discussed with patient and reminded patient to let us know any problems with medications     Objective:     Vital Signs:  Vitals:    07/28/19 0732 07/29/19 0700 07/29/19 0705 07/29/19 1831   BP: 106/67 126/73 109/64    BP Location: Left arm Left arm Left arm    Pulse: (!) 107 102 (!) 107    Resp:  18 18    Temp:  97 5 °F (36 4 °C)     TempSrc:  Temporal     SpO2:    97%   Weight:       Height:         Appearance:  age appropriate, casually dressed and overweight older than stated age   Behavior:  deviant, evasive and guarded and suspicious but with good eye contact   Speech: normal pitch and normal volume but tends to become loud at times   Mood:  anxious and dysthymic   Affect:  mood-congruent   Thought Process:  goal directed and illogical slightly pressured but able to be redirected   Thought Content:  delusions  grandiose and somatic about not being able to breathe despite being on nasal oxygen and paranoid about people out to harm her and Atrovent inhaler tainteded with peanut oil  No current suicidal homicidal thoughts intent or plans reported  No phobias obsessions or compulsions reported   Perceptual Disturbances: None and does not appear responding to internal stimuli   Risk Potential: Tendency to not care for herself if she goes off treatment   Sensorium:  person, place, time/date, situation, day of week, month of year and time   Cognition:  grossly intact   Consciousness:  alert and awake    Attention: Intact concentration and attention span   Intellect: Considered to be at least of average intelligence   Insight:  limited in denial   Judgment: poor      Motor Activity: no abnormal movements         Recent Labs:  Results Reviewed     None          I/O Past 24 hours:  No intake/output data recorded  No intake/output data recorded  Assessment / Plan:     Sch[facility]ffective [facility], bipolar [facility]      Reason for continued inpatient care:  Because of underlying paranoia and refusal to go back to the personal care home and inability to care for herself on her own  Acceptance by patient:  Accepting  Pauletta Dose in recovery:  About living in another personal care home once she feels better  Understanding of medications :  Has some understanding  Involved in reintegration process:  Adjusting to the unit  Trusting in relatoinship with psychiatrist:  Trusting    Recommended Treatment:    Medication changes:  1)continue the  clozapine and blood work has been acceptable       Non-pharmacological treatments  1) start individual therapy group therapy, milieu therapy and occupational therapy and milieu therapy involving multidisciplinary team approach with psychotherapist, case management, nursing, peer support services, art therapist etc using recovery principles and psycho-education about accepting illness and need for treatment   Safety  1) Safety/communication plan established targeting dynamic risk factors above  Discharge Plan most likely back to the a:  Personal care boarding home with act services once her delusions are managed and mood becomes more stabilized and she becomes more receptive to treatment compliance    Counseling / Coordination of Care    Total floor / unit time spent today 15 minutes  Greater than 50% of total time was spent with the patient and / or family counseling and / or coordination of care  A description of the counseling / coordination of care  Patient's Rights, confidentiality and exceptions to confidentiality, use of automated medical record, [facility] access to [facility] record, and consent to treatment reviewed      Jeffrey Gallegos MD

## 2019-07-30 NOTE — ASSESSMENT & PLAN NOTE
Psychiatry Progress Note    Subjective: Interval History     Patient is again not attending any of the groups and prefers to lay back on bed excusing herself for being on nasal oxygen   Her pulse ox has been quite acceptable but she believes that her being on nasal oxygen  hinders her from attending groups despite having the ability to use a portable oxygen tank  She is still preoccupied with the need for oxygen     She refused to sign admission papers unless her sister is present and she was not able to be reached by staff to facilitate this despite attempt by CM   She is still insisting that she cannot breathe or take showers on her own despite staff offering support and assistance even though pulse ox is acceptable most of the time  She is preoccupied with her breathing and appears to have some paranoia about the staff and she is again refusing to take the Atrovent claiming that there is "peanut oil in it  She has been sleeping well and has been using the nasal oxygen at night  She is compliant with medications  No current suicidal homicidal thoughts intent or plans reported  She is casually dressed fairly groomed but is guarded suspicious evasive been in denial of her illness  No overt hallucinations reported other than some underlying paranoid delusions and beliefs that there is a micro chip implanted on her left forearm pointing to the 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    Compliant with medications including the recent increase in clozapine    Current medications:    Current Facility-Administered Medications:     acetaminophen (TYLENOL) tablet 650 mg, 650 mg, Oral, Q6H PRN, Christian Bennett MD    acetaminophen (TYLENOL) tablet 650 mg, 650 mg, Oral, Q6H PRN, Christian Bennett MD    acetaminophen (TYLENOL) tablet 650 mg, 650 mg, Oral, Q6H PRN, Christian Bennett MD    albuterol (PROVENTIL HFA,VENTOLIN HFA) inhaler 2 puff, 2 puff, Inhalation, Q4H PRN, Christian Bennett MD, 2 puff at 07/25/19 1200    benzonatate (TESSALON PERLES) capsule 100 mg, 100 mg, Oral, TID PRN, Shi Pham MD    cloZAPine (CLOZARIL) tablet 100 mg, 100 mg, Oral, HS, Shi Pham MD, 100 mg at 07/29/19 2046    FLUoxetine (PROzac) capsule 10 mg, 10 mg, Oral, Daily, Shi Pham MD, 10 mg at 07/29/19 0848    fluticasone-vilanterol (BREO ELLIPTA) 200-25 MCG/INH inhaler 1 puff, 1 puff, Inhalation, Daily, Shi Pham MD, 1 puff at 07/29/19 0848    levothyroxine tablet 125 mcg, 125 mcg, Oral, Early Morning, Shi Pham MD, 125 mcg at 07/30/19 0609    OLANZapine (ZyPREXA) tablet 5 mg, 5 mg, Oral, Q8H PRN, Shi Pham MD    pantoprazole (PROTONIX) EC tablet 40 mg, 40 mg, Oral, Early Morning, Shi Pham MD, 40 mg at 07/30/19 0609    theophylline (JEF-24) 24 hr capsule 200 mg, 200 mg, Oral, Daily, Shi Pham MD, 200 mg at 07/29/19 0848    traZODone (DESYREL) tablet 25 mg, 25 mg, Oral, HS PRN, Shi Pham MD  Justification if on more than two antipsychotics:  Only on his clozapine  Side effects if any:  None    Risks , benefits, side effects and precautions of medications discussed with patient and reminded patient to let us know any problems with medications     Objective:     Vital Signs:  Vitals:    07/28/19 0732 07/29/19 0700 07/29/19 0705 07/29/19 1831   BP: 106/67 126/73 109/64    BP Location: Left arm Left arm Left arm    Pulse: (!) 107 102 (!) 107    Resp:  18 18    Temp:  97 5 °F (36 4 °C)     TempSrc:  Temporal     SpO2:    97%   Weight:       Height:         Appearance:  age appropriate, casually dressed and overweight older than stated age   Behavior:  deviant, evasive and guarded and suspicious but with good eye contact   Speech:  normal pitch and normal volume but tends to become loud at times   Mood:  anxious and dysthymic   Affect:  mood-congruent   Thought Process:  goal directed and illogical slightly pressured but able to be redirected   Thought Content:  delusions  grandiose and somatic about not being able to breathe despite being on nasal oxygen and paranoid about people out to harm her and Atrovent inhaler tainteded with peanut oil  No current suicidal homicidal thoughts intent or plans reported  No phobias obsessions or compulsions reported   Perceptual Disturbances: None and does not appear responding to internal stimuli   Risk Potential: Tendency to not care for herself if she goes off treatment   Sensorium:  person, place, time/date, situation, day of week, month of year and time   Cognition:  grossly intact   Consciousness:  alert and awake    Attention: Intact concentration and attention span   Intellect: Considered to be at least of average intelligence   Insight:  limited in denial   Judgment: poor      Motor Activity: no abnormal movements         Recent Labs:  Results Reviewed     None          I/O Past 24 hours:  No intake/output data recorded  No intake/output data recorded  Assessment / Plan:     Schizoaffective disorder, bipolar type ([facility] )      Reason for continued inpatient care:  Because of underlying paranoia and refusal to go back to the personal care home and inability to care for herself on her own  Acceptance by patient:  Accepting  Pj Saldaña in recovery:  About living in another personal care home once she feels better  Understanding of medications :  Has some understanding  Involved in reintegration process:  Adjusting to the unit  Trusting in relatoinship with psychiatrist:  Trusting    Recommended Treatment:    Medication changes:  1)continue the  clozapine and blood work has been acceptable  Non-pharmacological treatments  1) start individual therapy group therapy, milieu therapy and occupational therapy and milieu therapy involving multidisciplinary team approach with psychotherapist, case management, nursing, peer support services, art therapist etc using recovery principles and psycho-education about accepting illness and need for treatment       Safety  1) Safety/communication plan established targeting dynamic risk factors above  Discharge Plan most likely back to the a:  Personal care boarding home with act services once her delusions are managed and mood becomes more stabilized and she becomes more receptive to treatment compliance    Counseling / Coordination of Care    Total floor / unit time spent today 15 minutes  Greater than 50% of total time was spent with the patient and / or family counseling and / or coordination of care  A description of the counseling / coordination of care  Patient's Rights, confidentiality and exceptions to confidentiality, use of automated medical record, [facility] staff access to medical record, and consent to treatment reviewed      Zac Sierra MD

## 2019-07-30 NOTE — PLAN OF CARE
Problem: Alteration in Thoughts and Perception  Goal: Agree to be compliant with medication regime, as prescribed and report medication side effects  Description  Interventions:  - Offer appropriate PRN medication and supervise ingestion; conduct aims, as needed   Outcome: Progressing  Goal: Attend and participate in unit activities, including therapeutic, recreational, and educational groups  Description  Interventions:  - Provide therapeutic and educational activities daily, encourage attendance and participation, and document same in the medical record   Outcome: Progressing     Problem: Depression  Goal: Refrain from isolation  Description  Interventions:  - Develop a trusting relationship   - Encourage socialization   Outcome: Progressing     [address]ora has a bit of an irritable edge in her interactions w/staff, though, overall is cordial  Has been punctual coming for her scheduled medicines, ate 100% of meal, took part in PM Group, had HS snack  She is social w/select peer group, particularly w/male peer ZS, but, keeping appropriate boundaries

## 2019-07-30 NOTE — PLAN OF CARE
Problem: Alteration in Thoughts and Perception  Goal: Agree to be compliant with medication regime, as prescribed and report medication side effects  Description  Interventions:  - Offer appropriate PRN medication and supervise ingestion; conduct aims, as needed   7/29/2019 2202 by Orly Graff RN  Outcome: Progressing  7/29/2019 2157 by Orly Graff RN  Outcome: Progressing     Problem: Alteration in Thoughts and Perception  Goal: Attend and participate in unit activities, including therapeutic, recreational, and educational groups  Description  Interventions:  - Provide therapeutic and educational activities daily, encourage attendance and participation, and document same in the medical record   7/29/2019 2202 by Orly Graff RN  Outcome: Not Progressing  7/29/2019 2157 by Orly Graff RN  Outcome: Progressing     Problem: Depression  Goal: Refrain from isolation  Description  Interventions:  - Develop a trusting relationship   - Encourage socialization   7/29/2019 2202 by Orly Graff RN  Outcome: Not Progressing  7/29/2019 2157 by Orly Graff RN  Outcome: Progressing     [address] R[address] Baez is pleasant w/staff  Continues a bit worrisome about her PO2 (was 97% on random check @ 1830) & continues to believe it hampers her function, precludes her being active in milieu  Has come out of room to dining room for meal (ate 100%), for scheduled medicine, to get an HS snack  Did not attend PM Group  Isolates in room the remainder of her time  Staff continues to encourage her to be more active, come out of room more often  She is wearing her nasal O2 @ 1L via cannula/compressor @ HS

## 2019-07-30 NOTE — PROGRESS NOTES
Sleeping at this time, no s/s of distress noted  Monitoring continues  O2 on 1L   All precautions maintained   POX 95%

## 2019-07-30 NOTE — PROGRESS NOTES
07/30/19 0900   Team Meeting   Meeting Type Daily Rounds   Team Members Present   Team Members Present Physician;Nurse;; Other (Discipline and Name)   Physician Team Member Dr Ileana Bro Team Member Jeff Everett RN   Care Management Team Member Brenda Lao   Other (Discipline and Name) [place], [place]; SHANIKA Hylton     Patient very preoccupied with her O2 usage but levels are stable  States it keeps her from groups and performing her ADLs  Slept

## 2019-07-31 RX ADMIN — THEOPHYLLINE ANHYDROUS 200 MG: 200 CAPSULE, EXTENDED RELEASE ORAL at 08:57

## 2019-07-31 RX ADMIN — CLOZAPINE 100 MG: 100 TABLET ORAL at 20:48

## 2019-07-31 RX ADMIN — PANTOPRAZOLE SODIUM 40 MG: 40 TABLET, DELAYED RELEASE ORAL at 05:42

## 2019-07-31 RX ADMIN — FLUOXETINE 10 MG: 10 CAPSULE ORAL at 08:57

## 2019-07-31 RX ADMIN — LEVOTHYROXINE SODIUM 125 MCG: 125 TABLET ORAL at 05:42

## 2019-07-31 RX ADMIN — FLUTICASONE FUROATE AND VILANTEROL TRIFENATATE 1 PUFF: 200; 25 POWDER RESPIRATORY (INHALATION) at 08:58

## 2019-07-31 NOTE — PROGRESS NOTES
07/31/19 0900   Team Meeting   Meeting Type Daily Rounds   Team Members Present   Team Members Present Nurse;   Nursing Team Member Mary Hooper RN   Care Management Team Member Brenda Oliveros     Patient appears less fatigued  Attended graduation  Preoccupied with her oxygen  When asked to attend evening group, she responded stating she only need to go to one group  Behaviors controlled

## 2019-07-31 NOTE — PLAN OF CARE
Problem: Alteration in Thoughts and Perception  Goal: Agree to be compliant with medication regime, as prescribed and report medication side effects  Description  Interventions:  - Offer appropriate PRN medication and supervise ingestion; conduct aims, as needed   Outcome: Progressing  Goal: Recognize dysfunctional thoughts, communicate reality-based thoughts at the time of discharge  Description  Interventions:  - Provide medication and psycho-education to assist patient in compliance and developing insight into his/her illness   Outcome: Progressing     Problem: Ineffective Coping  Goal: Participates in unit activities  Description  Interventions:  - Provide therapeutic environment   - Provide required programming   - Redirect inappropriate behaviors   Outcome: Progressing     Problem: Depression  Goal: Refrain from isolation  Description  Interventions:  - Develop a trusting relationship   - Encourage socialization   Outcome: Progressing     Problem: Anxiety  Goal: Anxiety is at manageable level  Description  Interventions:  - Assess and monitor patient's anxiety level  - Monitor for signs and symptoms of anxiety both physical and emotional (heart palpitations, chest pain, shortness of breath, headaches, nausea, feeling jumpy, restlessness, irritable, apprehensive)  - Collaborate with interdisciplinary team and initiate plan and interventions as ordered    - [place] patient to unit/surroundings  - Explain treatment plan  - Encourage participation in care  - Encourage verbalization of concerns/fears  - Identify coping mechanisms  - Assist in developing anxiety-reducing skills  - Administer/offer alternative therapies  - Limit or eliminate stimulants  Outcome: Progressing     Problem: Alteration in Orientation  Goal: Interact with staff daily  Description  Interventions:  - Assess and re-assess patient's level of orientation  - Engage patient in 1 on 1 interactions, daily, for a minimum of 15 minutes   - Establish rapport/trust with patient   Outcome: Progressing     Problem: SAFETY ADULT  Goal: Patient will remain free of falls  Description  INTERVENTIONS:  - Assess patient frequently for physical needs  -  Identify cognitive and physical deficits and behaviors that affect risk of falls    -  [place] fall precautions as indicated by assessment   - Educate patient/family on patient safety including physical limitations  - Instruct patient to call for assistance with activity based on assessment  - Modify environment to reduce risk of injury  - Consider OT/PT consult to assist with strengthening/mobility  Outcome: Progressing    Breakfast 75% and Lunch 100%  Attended IMR group today  Fall Precautions continued, gait steady, denied pain, no issues with swallowing, not an aspiration risk at this time, able to drink water and swallow medications whole, compliant with morning medications with minimal prompting, pleasant and cooperative during interactions with staff and peers, more visible today, less needy, less preoccupied with somatic complaints, interacting more with select peers  Jannette from [address] team stopped in today and had Norris Long sign some paperwork
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
## 2019-07-31 NOTE — PLAN OF CARE
Problem: Alteration in Thoughts and Perception  Goal: Agree to be compliant with medication regime, as prescribed and report medication side effects  Description  Interventions:  - Offer appropriate PRN medication and supervise ingestion; conduct aims, as needed   Outcome: Progressing     Problem: Alteration in Thoughts and Perception  Goal: Attend and participate in unit activities, including therapeutic, recreational, and educational groups  Description  Interventions:  - Provide therapeutic and educational activities daily, encourage attendance and participation, and document same in the medical record   Outcome: Not Progressing     Problem: Depression  Goal: Refrain from isolation  Description  Interventions:  - Develop a trusting relationship   - Encourage socialization   Outcome: Not Progressing     [address] is isolative to her room mostly, coming out so far only to eat meal (had 100%)  Continues to worry about her breathing, to view herself as largely incapacitated by her respiratory status  Was unwilling to attempt PM Group in living room even w/staff offer to walk w/her, let her rest a moment in Conference Room, the assisted point there  Says agreed in Treatment Team to attend the 1100 group daily as a starting point, went today, but, not willing yet beyond that AM group  She is pleasant, but, resolute in her stance

## 2019-07-31 NOTE — PLAN OF CARE
Problem: PAIN - ADULT  Goal: Verbalizes/displays adequate comfort level or baseline comfort level  Description  Interventions:  - Encourage patient to monitor pain and request assistance  - Assess pain using appropriate pain scale  - Administer analgesics based on type and severity of pain and evaluate response  - Implement non-pharmacological measures as appropriate and evaluate response  - Consider cultural and social influences on pain and pain management  - Notify physician/advanced practitioner if interventions unsuccessful or patient reports new pain  Outcome: Progressing     Problem: SAFETY ADULT  Goal: Patient will remain free of falls  Description  INTERVENTIONS:  - Assess patient frequently for physical needs  -  Identify cognitive and physical deficits and behaviors that affect risk of falls    -  [place] fall precautions as indicated by assessment   - Educate patient/family on patient safety including physical limitations  - Instruct patient to call for assistance with activity based on assessment  - Modify environment to reduce risk of injury  - Consider OT/PT consult to assist with strengthening/mobility  Outcome: Progressing     Problem: RESPIRATORY - ADULT  Goal: Achieves optimal ventilation and oxygenation  Description  INTERVENTIONS:  - Assess for changes in respiratory status  - Assess for changes in mentation and behavior  - Position to facilitate oxygenation and minimize respiratory effort  - Oxygen administration by appropriate delivery method based on oxygen saturation (per order) or ABGs  - Initiate smoking cessation education as indicated  - Encourage broncho-pulmonary hygiene including cough, deep breathe, Incentive Spirometry  - Assess the need for suctioning and aspirate as needed  - Assess and instruct to report SOB or any respiratory difficulty  - Respiratory Therapy support as indicated  Outcome: Jennifer Araujo maintained on ongoing aspiration, fall and SAFE precaution without incident on this shift  Laying in bed with eyes closed, breath even and unlabored, with 02 @1l/m via nasal cannula for COPD   No respiratory distress noted  Rosemary Lesch will continue to remain SAFE and free from fall while residing on EAC     Q 15 minutes checks continues during rounds   No indication of pain or discomfort noted   Will continue to monitor sleep and behavioral pattern

## 2019-07-31 NOTE — PROGRESS NOTES
2[address] Agustin Edgar came out of room to get & eat HS snack & to window for HS medicine  HS nasal O2 @ 1L via cannula/compressor applied

## 2019-08-01 RX ADMIN — PANTOPRAZOLE SODIUM 40 MG: 40 TABLET, DELAYED RELEASE ORAL at 06:01

## 2019-08-01 RX ADMIN — CLOZAPINE 100 MG: 100 TABLET ORAL at 20:52

## 2019-08-01 RX ADMIN — FLUOXETINE 10 MG: 10 CAPSULE ORAL at 08:51

## 2019-08-01 RX ADMIN — LEVOTHYROXINE SODIUM 125 MCG: 125 TABLET ORAL at 06:01

## 2019-08-01 RX ADMIN — THEOPHYLLINE ANHYDROUS 200 MG: 200 CAPSULE, EXTENDED RELEASE ORAL at 08:51

## 2019-08-01 RX ADMIN — FLUTICASONE FUROATE AND VILANTEROL TRIFENATATE 1 PUFF: 200; 25 POWDER RESPIRATORY (INHALATION) at 08:55

## 2019-08-01 NOTE — PLAN OF CARE
Problem: PAIN - ADULT  Goal: Verbalizes/displays adequate comfort level or baseline comfort level  Description  Interventions:  - Encourage patient to monitor pain and request assistance  - Assess pain using appropriate pain scale  - Administer analgesics based on type and severity of pain and evaluate response  - Implement non-pharmacological measures as appropriate and evaluate response  - Consider cultural and social influences on pain and pain management  - Notify physician/advanced practitioner if interventions unsuccessful or patient reports new pain  Outcome: Progressing     Problem: SAFETY ADULT  Goal: Patient will remain free of falls  Description  INTERVENTIONS:  - Assess patient frequently for physical needs  -  Identify cognitive and physical deficits and behaviors that affect risk of falls    -  [place] fall precautions as indicated by assessment   - Educate patient/family on patient safety including physical limitations  - Instruct patient to call for assistance with activity based on assessment  - Modify environment to reduce risk of injury  - Consider OT/PT consult to assist with strengthening/mobility  Outcome: Progressing     Problem: RESPIRATORY - ADULT  Goal: Achieves optimal ventilation and oxygenation  Description  INTERVENTIONS:  - Assess for changes in respiratory status  - Assess for changes in mentation and behavior  - Position to facilitate oxygenation and minimize respiratory effort  - Oxygen administration by appropriate delivery method based on oxygen saturation (per order) or ABGs  - Initiate smoking cessation education as indicated  - Encourage broncho-pulmonary hygiene including cough, deep breathe, Incentive Spirometry  - Assess the need for suctioning and aspirate as needed  - Assess and instruct to report SOB or any respiratory difficulty  - Respiratory Therapy support as indicated  Outcome: Roby Khan maintained on ongoing aspiration, fall and SAFE precaution without incident on this shift    Laying in bed with eyes closed, breath even and unlabored, with 02 @1l/m via nasal cannula for COPD   No respiratory distress noted  [place] [facility] will continue to remain SAFE and free from fall while residing on EAC     Q 15 minutes checks continues during rounds   No indication of pain or discomfort noted   Will continue to monitor sleep and behavioral pattern

## 2019-08-01 NOTE — PLAN OF CARE
Problem: Alteration in Thoughts and Perception  Goal: Agree to be compliant with medication regime, as prescribed and report medication side effects  Description  Interventions:  - Offer appropriate PRN medication and supervise ingestion; conduct aims, as needed   Outcome: Progressing     Problem: Alteration in Thoughts and Perception  Goal: Attend and participate in unit activities, including therapeutic, recreational, and educational groups  Description  Interventions:  - Provide therapeutic and educational activities daily, encourage attendance and participation, and document same in the medical record   Outcome: Not Progressing  Goal: Complete daily ADLs, including personal hygiene independently, as able  Description  Interventions:  - Observe, teach, and assist patient with ADLS  - Monitor and promote a balance of rest/activity, with adequate nutrition and elimination   Outcome: Not Progressing     Problem: Depression  Goal: Refrain from isolation  Description  Interventions:  - Develop a trusting relationship   - Encourage socialization   Outcome: Not Progressing     [address] is isolative to her room & bed w/exception of coming out for meal (ate 25%), for scheduled HS medicine when prompted, for HS snack  Did not attend  PM Group  She is pleasant  She agrees to shower tomorrow using shower chair in stall, wearing nasal O2 @ 1L via extension tubing running from the compressor in her room & w/staff supervision for safety  Wearing her nasal O2 @ 1L now via cannula/compressor for HS

## 2019-08-01 NOTE — PLAN OF CARE
Problem: DISCHARGE PLANNING  Goal: Discharge to home or other facility with appropriate resources  Description  INTERVENTIONS:  - Conduct assessment to determine patient/family and health care team treatment goals, and need for post-acute services based on payer coverage, community resources, and patient preferences, and barriers to discharge  - Address psychosocial, clinical, and financial barriers to discharge as identified in assessment in conjunction with the patient/family and health care team  - Assist the patient in reintegration back into the community by removing barriers which may hinder a successful discharge once deemed stable  - Arrange appropriate level of post-acute services according to patient's needs and preference and payer coverage in collaboration with the physician and health care team  - Communicate with and update the patient/family, physician, and health care team regarding progress on the discharge plan  - Arrange appropriate transportation to post-acute venues    Outcome: Progressing     CM called and spoke with patient's sister, Miriam Cline, to discuss patient  CM reported that patient is adjusting to the unit but still preoccupied about her O2  CM stated [facility] staff is attempting to do education with her about when it is appropriate to use her oxygen  Miriam Cline reported that was really good to hear and noted that she hopes patient cooperates and works the program  [facility] also educated her a bit more on the [facility] program and she was happy to hear that the goal is to get patient up and out of her room and into the community  Miriam Cline also stated that patient did for a time live with her and her , however, they cannot do that again and she knows that patient has been stating she'd like to live with their elderly parents, but again feels this is not a good plan for patient due to their elderly parents having issues of their own   CM stated that it was discussed with patient that the hope would be to [facility] to [facility] Quintana is in agreement with this plan  CM encouraged Daja Quintana to call this CM with any questions or concerns she may have while patient is here  CM will continue to follow patient's progress and assist with discharge planning needs

## 2019-08-01 NOTE — PROGRESS NOTES
Psychiatry Progress Note    Subjective: Interval History     Priscilla Long was observed laying in bed, although easily awakened and eager to engage in the interview  She states that she is "really making progress," reporting that she attended the morning community meeting for the second consecutive day  She states that she is "too tired" to attend the programs offered thereafter, stating that she is "old" and has "many health problems " She has been sleeping and eating well  She has been ambulating to-and-from the dining area without issue  She denies any homicidal or suicidal ideations       Behavior over the last 24 hours:  unchanged  Sleep: normal  Appetite: normal  Medication side effects: No  ROS: no complaints    Current medications:    Current Facility-Administered Medications:     acetaminophen (TYLENOL) tablet 650 mg, 650 mg, Oral, Q6H PRN, Ervin Little MD    acetaminophen (TYLENOL) tablet 650 mg, 650 mg, Oral, Q6H PRN, Ervin Little MD    acetaminophen (TYLENOL) tablet 650 mg, 650 mg, Oral, Q6H PRN, Ervin Little MD    albuterol (PROVENTIL HFA,VENTOLIN HFA) inhaler 2 puff, 2 puff, Inhalation, Q4H PRN, Ervin Little MD, 2 puff at 07/25/19 1200    benzonatate (TESSALON PERLES) capsule 100 mg, 100 mg, Oral, TID PRN, Ervin Little MD    cloZAPine (CLOZARIL) tablet 100 mg, 100 mg, Oral, HS, Ervin Little MD, 100 mg at 07/31/19 2048    FLUoxetine (PROzac) capsule 10 mg, 10 mg, Oral, Daily, Ervin Little MD, 10 mg at 07/31/19 0857    fluticasone-vilanterol (BREO ELLIPTA) 200-25 MCG/INH inhaler 1 puff, 1 puff, Inhalation, Daily, Ervin Little MD, 1 puff at 07/31/19 0858    levothyroxine tablet 125 mcg, 125 mcg, Oral, Early Morning, Ervin Little MD, 125 mcg at 07/31/19 0542    OLANZapine (ZyPREXA) tablet 5 mg, 5 mg, Oral, Q8H PRN, Ervin Little MD    pantoprazole (PROTONIX) EC tablet 40 mg, 40 mg, Oral, Early Morning, Ervin Little MD, 40 mg at 07/31/19 0542    theophylline (JEF-24) 24 hr capsule 200 mg, 200 mg, Oral, Daily, Ivonne Nevarez MD, 200 mg at 07/31/19 0857    traZODone (DESYREL) tablet 25 mg, 25 mg, Oral, HS PRN, Ivonne Nevarez MD    Current Problem List:    Patient Active Problem List   Diagnosis    [facility]    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[facility]    Tobacco use disorder, continuous    Bipolar disorder ([facility]    Left hip pain    Lactic acidosis    Hypokalemia    Hypomagnesemia    Compression fracture of L4 lumbar vertebra    Thoracic compression fracture (HCC)    Ventral hernia    Parapneumonic effusion    Acute on chronic respiratory failure with hypoxia (HCC)    Chronic respiratory failure (HCC)    Hypophosphatemia    Elevated MCV    Compression deformity of vertebra    Schizoaffective disorder, bipolar type (HCC)    Acquired hypothyroidism    Gastroesophageal reflux disease without esophagitis    Abnormal CT of the chest    Excessive cerumen in left ear canal    Lipoma of right upper extremity    Polydipsia    Localized swelling of both lower legs    Noncompliant with deep vein thrombosis (DVT) prophylaxis    Allergic conjunctivitis of right eye       Problem list reviewed 07/31/19     Objective:     Vital Signs:  Vitals:    07/30/19 0732 07/30/19 2120 07/31/19 0730 07/31/19 0732   BP: 97/68  129/79 137/68   BP Location: Left arm  Left arm Left arm   Pulse: 105  97 96   Resp:   18    Temp:   97 9 °F (36 6 °C)    TempSrc:   Temporal    SpO2:  98%     Weight:       Height:             Appearance:  age appropriate and casually dressed   Behavior:  uncooperative   Speech:  normal volume   Mood:  anxious and irritable   Affect:  constricted   Thought Process:  normal   Thought Content:  delusions  persecutory   Perceptual Disturbances: None   Risk Potential: none   Sensorium:  person, place, situation and time   Cognition:  intact   Consciousness:  alert and awake    Attention: attention span and concentration were age appropriate   Intellect: average   Insight:  limited   Judgment: limited      Motor Activity: no abnormal movements       I/O Past 24 hours:  No intake/output data recorded  No intake/output data recorded  Labs:  Reviewed 07/31/19      Assessment / Plan:     Sch[facility]ffective disorder, bipolar type ([facility]    Recommended Treatment:      Medication changes:  1) continue current medication regimen  Non-pharmacological treatments  1) Continue with group therapy, milieu therapy and occupational therapy  Safety  1) Safety/communication plan established targeting dynamic risk factors above  2) Risks, benefits, and possible side effects of medications explained to patient and patient verbalizes understanding  Counseling / Coordination of Care    Total floor / unit time spent today 20 minutes  Greater than 50% of total time was spent with the patient and / or family counseling and / or coordination of care  A description of the counseling / coordination of care  Patient's Rights, confidentiality and exceptions to confidentiality, use of automated medical record, [facility] staff access to medical record, and consent to treatment reviewed      ABILIO Gagnon

## 2019-08-01 NOTE — PLAN OF CARE
Problem: Alteration in Thoughts and Perception  Goal: Agree to be compliant with medication regime, as prescribed and report medication side effects  Description  Interventions:  - Offer appropriate PRN medication and supervise ingestion; conduct aims, as needed   Outcome: Progressing  Goal: Complete daily ADLs, including personal hygiene independently, as able  Description  Interventions:  - Observe, teach, and assist patient with ADLS  - Monitor and promote a balance of rest/activity, with adequate nutrition and elimination   Outcome: Progressing     Problem: Risk for Self Injury/Neglect  Goal: Verbalize thoughts and feelings  Description  Interventions:  - Assess and re-assess patient's lethality and potential for self-injury  - Engage patient in 1:1 interactions, daily, for a minimum of 15 minutes  - Encourage patient to express feelings, fears, frustrations, hopes  - Establish rapport/trust with patient   Outcome: Progressing  Goal: Refrain from harming self  Description  Interventions:  - Monitor patient closely, per order  - Develop a trusting relationship  - Supervise medication ingestion, monitor effects and side effects   Outcome: Progressing     Problem: SAFETY ADULT  Goal: Patient will remain free of falls  Description  INTERVENTIONS:  - Assess patient frequently for physical needs  -  Identify cognitive and physical deficits and behaviors that affect risk of falls    -  [place] fall precautions as indicated by assessment   - Educate patient/family on patient safety including physical limitations  - Instruct patient to call for assistance with activity based on assessment  - Modify environment to reduce risk of injury  - Consider OT/PT consult to assist with strengthening/mobility  Outcome: Progressing     Breakfast 50% and Lunch 100%  Did not attend groups today  Fall Precautions, gait steady, denied pain  Compliant with routine medications and vital signs, no prompting needed, preoccupied with somatic complaints, see narrative note, isolative napping most of the morning out only for meals and medications

## 2019-08-01 NOTE — PROGRESS NOTES
Psychiatry Progress Note    Subjective: Interval History     Chanda Rod was interviewed while she was laying in bed  Her affect appears brighter  She presents as less somatically preoccupied  She has been less needy and attention-seeking  She states that she will be attending the 11 am group today  She states that expecting her to do more than that is "totally unrealistic " She denies any homicidal or suicidal ideations       Behavior over the last 24 hours:  unchanged  Sleep: normal  Appetite: normal  Medication side effects: No  ROS: no complaints    Current medications:    Current Facility-Administered Medications:     acetaminophen (TYLENOL) tablet 650 mg, 650 mg, Oral, Q6H PRN, Zander Yanez MD    acetaminophen (TYLENOL) tablet 650 mg, 650 mg, Oral, Q6H PRN, Zander Yanez MD    acetaminophen (TYLENOL) tablet 650 mg, 650 mg, Oral, Q6H PRN, Zander Yanez MD    albuterol (PROVENTIL HFA,VENTOLIN HFA) inhaler 2 puff, 2 puff, Inhalation, Q4H PRN, Zander Yanez MD, 2 puff at 07/25/19 1200    benzonatate (TESSALON PERLES) capsule 100 mg, 100 mg, Oral, TID PRN, Zander Yanez MD    cloZAPine (CLOZARIL) tablet 100 mg, 100 mg, Oral, HS, Zander Yanez MD, 100 mg at 07/31/19 2048    FLUoxetine (PROzac) capsule 10 mg, 10 mg, Oral, Daily, Zander Yanez MD, 10 mg at 08/01/19 0851    fluticasone-vilanterol (BREO ELLIPTA) 200-25 MCG/INH inhaler 1 puff, 1 puff, Inhalation, Daily, Zander Yanez MD, 1 puff at 08/01/19 0855    levothyroxine tablet 125 mcg, 125 mcg, Oral, Early Morning, Zander Yanez MD, 125 mcg at 08/01/19 0601    OLANZapine (ZyPREXA) tablet 5 mg, 5 mg, Oral, Q8H PRN, Zander Yanez MD    pantoprazole (PROTONIX) EC tablet 40 mg, 40 mg, Oral, Early Morning, Zander Yanez MD, 40 mg at 08/01/19 0601    theophylline (JEF-24) 24 hr capsule 200 mg, 200 mg, Oral, Daily, Zander Yanez MD, 200 mg at 08/01/19 0851    traZODone (DESYREL) tablet 25 mg, 25 mg, Oral, HS PRN, Zander Yanez MD    Current Problem List:    Patient Active Problem List   Diagnosis    [facility]    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    Tobacco use disorder, continuous    B[facility] disorder ([facility] )    Left hip pain    Lactic acidosis    Hypokalemia    Hypomagnesemia    Compression fracture of L4 lumbar vertebra    Thoracic compression fracture (HCC)    Ventral hernia    Parapneumonic effusion    Acute on chronic respiratory failure with hypoxia (HCC)    Chronic respiratory failure (HCC)    Hypophosphatemia    Elevated MCV    Compression deformity of vertebra    Schizoaffective disorder, bipolar type (HCC)    Acquired hypothyroidism    Gastroesophageal reflux disease without esophagitis    Abnormal CT of the chest    Excessive cerumen in left ear canal    Lipoma of right upper extremity    Polydipsia    Localized swelling of both lower legs    Noncompliant with deep vein thrombosis (DVT) prophylaxis    Allergic conjunctivitis of right eye       Problem list reviewed 08/01/19     Objective:     Vital Signs:  Vitals:    07/31/19 0732 07/31/19 2130 08/01/19 0700 08/01/19 0900   BP: 137/68  (!) 84/53 92/54   BP Location: Left arm  Left arm Right arm   Pulse: 96  (!) 111 98   Resp:   22 18   Temp:   (!) 97 4 °F (36 3 °C)    TempSrc:   Temporal    SpO2:  92% 96% 95%   Weight:       Height:             Appearance:  age appropriate and casually dressed   Behavior:  uncooperative   Speech:  normal volume   Mood:  anxious and irritable   Affect:  constricted   Thought Process:  normal   Thought Content:  delusions  persecutory   Perceptual Disturbances: None   Risk Potential: none   Sensorium:  person, place, situation and time   Cognition:  intact   Consciousness:  alert and awake    Attention: attention span and concentration were age appropriate   Intellect: average   Insight:  limited   Judgment: limited      Motor Activity: no abnormal movements       I/O Past 24 hours:  No intake/output data recorded  No intake/output data recorded          Labs:  Reviewed 08/01/19      Assessment / Plan:     Schizoaffective disorder, bipolar type ([facility] )    Recommended Treatment:      Medication changes:  1) continue current medication regimen  Non-pharmacological treatments  1) Continue with group therapy, milieu therapy and occupational therapy  Safety  1) Safety/communication plan established targeting dynamic risk factors above  2) Risks, benefits, and possible side effects of medications explained to patient and patient verbalizes understanding  Counseling / Coordination of Care    Total floor / unit time spent today 20 minutes  Greater than 50% of total time was spent with the patient and / or family counseling and / or coordination of care  A description of the counseling / coordination of care  Patient's Rights, confidentiality and exceptions to confidentiality, use of automated medical record, [facility] staff access to medical record, and consent to treatment reviewed      ABILIO Mcdermott

## 2019-08-01 NOTE — PROGRESS NOTES
Refused to shower, stated, "My blood pressure was low this morning " Vital signs rechecked BP 92/54, pulse 98, pulse ox 95% on room air, resting sitting up in bed, fluids encouraged, will shower with stand by assist staff, when she is willing to comply

## 2019-08-01 NOTE — PROGRESS NOTES
08/01/19 0900   Team Meeting   Meeting Type Daily Rounds   Team Members Present   Team Members Present Nurse;; Other (Discipline and Name)   Nursing Team Member Radha Nance RN   Care Management Team Member Brenda Cancino   Other (Discipline and Name) Emerson Simon [place]; SHANIKA Trevino     Patient attended [address] yesterday  Presented less needy yesterday evening  Less O2 focused as well  Slept

## 2019-08-02 LAB
BASOPHILS # BLD AUTO: 0.1 THOUSANDS/ΜL (ref 0–0.1)
BASOPHILS NFR BLD AUTO: 1 % (ref 0–1)
EOSINOPHIL # BLD AUTO: 0.1 THOUSAND/ΜL (ref 0–0.4)
EOSINOPHIL NFR BLD AUTO: 1 % (ref 0–6)
ERYTHROCYTE [DISTWIDTH] IN BLOOD BY AUTOMATED COUNT: 15.2 %
HCT VFR BLD AUTO: 42.6 % (ref 36–46)
HGB BLD-MCNC: 13.9 G/DL (ref 12–16)
LYMPHOCYTES # BLD AUTO: 2.2 THOUSANDS/ΜL (ref 0.5–4)
LYMPHOCYTES NFR BLD AUTO: 27 % (ref 25–45)
MCH RBC QN AUTO: 30.1 PG (ref 26–34)
MCHC RBC AUTO-ENTMCNC: 32.8 G/DL (ref 31–36)
MCV RBC AUTO: 92 FL (ref 80–100)
MONOCYTES # BLD AUTO: 0.6 THOUSAND/ΜL (ref 0.2–0.9)
MONOCYTES NFR BLD AUTO: 8 % (ref 1–10)
NEUTROPHILS # BLD AUTO: 5.2 THOUSANDS/ΜL (ref 1.8–7.8)
NEUTS SEG NFR BLD AUTO: 64 % (ref 45–65)
PLATELET # BLD AUTO: 206 THOUSANDS/UL (ref 150–450)
PMV BLD AUTO: 9.3 FL (ref 8.9–12.7)
RBC # BLD AUTO: 4.63 MILLION/UL (ref 4–5.2)
WBC # BLD AUTO: 8.1 THOUSAND/UL (ref 4.5–11)

## 2019-08-02 PROCEDURE — 85025 COMPLETE CBC W/AUTO DIFF WBC: CPT | Performed by: PSYCHIATRY & NEUROLOGY

## 2019-08-02 RX ADMIN — LEVOTHYROXINE SODIUM 125 MCG: 125 TABLET ORAL at 05:33

## 2019-08-02 RX ADMIN — THEOPHYLLINE ANHYDROUS 200 MG: 200 CAPSULE, EXTENDED RELEASE ORAL at 09:09

## 2019-08-02 RX ADMIN — CLOZAPINE 100 MG: 100 TABLET ORAL at 20:44

## 2019-08-02 RX ADMIN — FLUOXETINE 10 MG: 10 CAPSULE ORAL at 09:09

## 2019-08-02 RX ADMIN — PANTOPRAZOLE SODIUM 40 MG: 40 TABLET, DELAYED RELEASE ORAL at 05:33

## 2019-08-02 RX ADMIN — FLUTICASONE FUROATE AND VILANTEROL TRIFENATATE 1 PUFF: 200; 25 POWDER RESPIRATORY (INHALATION) at 09:10

## 2019-08-02 NOTE — PROGRESS NOTES
Pharmacy Clozapine Monitoring Progress Note     Jose Cardenas is receiving 100 mg clozapine daily at bedtime  Assessment/Plan:    Current phase of treatment is initation/titration  **If clozapine therapy is interrupted for more than 48 hours, therapy MUST be restarted at the initial titration dose to avoid hypotension, bradycardia, and syncope  **    Patient is eligible to receive clozapine and the [address] monitoring frequency is weekly per clozapine REMS  Most recent [address] was updated into the REMS program      The most recent [address] was   Lab Results   Component Value Date    NEUTROABS 5 20 08/02/2019    and the next lab is due on 8/9/19  Last Clozapine level (if available):    No results found for: CLOZAPINE  No results found for: NCLOZIP            Pharmacist Recommendations:    1  Patient is REMS eligible and ANC was updated with weekly frequency  Next [address] due 8/9/19      2  Recommend bowel regimen senna-docusate 8 6 - 50mg twice daily for prophylaxis with clozapine initiation  Monitoring: Adverse Effect Suggested Monitoring Patient's Status    Agranulocytosis ANC baseline and then repeat weekly for first 6 months and every 2 weeks for the second 6 months  Maintenance after one year of therapy every month  The most recent [address] was   Lab Results   Component Value Date    NEUTROABS 5 20 08/02/2019       This ANC is considered normal range (ANC >/= 1500/mcL)  Continue current treatment and monitoring course  Respiratory Depression Please ensure patient is not on concomitant respiratory lowering medications such as benzodiazepines, sedative hypnotics (eg  zolpidem) This patient is not on respiratory depression lowering medications   Myocarditis Baseline and weekly EKG, CRP, BNP, and troponin  Weekly symptomatic complaints of fatigue, dyspnea, tachycardia, chest pain, palpitations, and fever for the first 4 weeks    Last EKG Results on 7/17/2019  showed:  normal sinus rhythm and left axis deviation    Last QTC on 7/17/2019 was   0   Lab Value Date/Time    QTCINT 481 07/17/2019 0617       Last BNP was No results found for: NTBNP    Last Troponin was  0   Lab Value Date/Time    TROPONINI <0 01 05/01/2019 1318    TROPONINI <0 04 05/10/2015 1948      Orthostatic hypotension/  bradycardia Blood pressure and vital signs      Monitor blood pressure and orthostatic hypotension at least twice daily upon initiation and continue to follow at least once daily afterwards  /77 (BP Location: Right arm) Comment: standing 127/78 pulse 97  Pulse 91   Temp (!) 97 2 °F (36 2 °C)   Resp 18   Ht 5' 4" (1 626 m)   Wt 66 2 kg (146 lb)   SpO2 92%   BMI 25 06 kg/m²             Constipation (bowel obstruction) Assess at baseline and weekly during first four months of therapy  Docusate 100mg BID and Miralax 17 grams daily recommended initially  There is no prophylactic bowel regimen ordered  Would recommend 8 6 - 50mg senna docusate twice daily  Sialorrhea Assess at baseline and weekly during first four months of therapy  May be managed using sublingual anticholinergic preparations (atropine 1% eye drops)  Patient is not experiencing sialorrhea  This will continue to be monitored  Please note that per current REMS protocol the provider can submit a treatment rationale that justifies clozapine treatment even if patient parameters are outside of REMS recommendations  The Clozapine REMS is an FDA-mandated program implemented by the manufacturers of clozapine  ([employer]  com/CpmgClozapineUI/home  u)  Pharmacy will continue to follow patient with team, thank you    Electronically signed by: Gavin Pappas, CrystalD, [place] [id number], Clinical Pharmacist - Psychiatry

## 2019-08-02 NOTE — PLAN OF CARE
Problem: Alteration in Thoughts and Perception  Goal: Attend and participate in unit activities, including therapeutic, recreational, and educational groups  Description  Interventions:  - Provide therapeutic and educational activities daily, encourage attendance and participation, and document same in the medical record   Outcome: Progressing  Goal: Complete daily ADLs, including personal hygiene independently, as able  Description  Interventions:  - Observe, teach, and assist patient with ADLS  - Monitor and promote a balance of rest/activity, with adequate nutrition and elimination   Outcome: Progressing     Problem: Ineffective Coping  Goal: Understands least restrictive measures  Description  Interventions:  - Utilize least restrictive behavior  Outcome: Progressing     Problem: Risk for Self Injury/Neglect  Goal: Refrain from harming self  Description  Interventions:  - Monitor patient closely, per order  - Develop a trusting relationship  - Supervise medication ingestion, monitor effects and side effects   Outcome: Progressing  Goal: Complete daily ADLs, including personal hygiene independently, as able  Description  Interventions:  - Observe, teach, and assist patient with ADLS  - Monitor and promote a balance of rest/activity, with adequate nutrition and elimination  Outcome: Progressing     Problem: Anxiety  Goal: Anxiety is at manageable level  Description  Interventions:  - Assess and monitor patient's anxiety level  - Monitor for signs and symptoms of anxiety both physical and emotional (heart palpitations, chest pain, shortness of breath, headaches, nausea, feeling jumpy, restlessness, irritable, apprehensive)  - Collaborate with interdisciplinary team and initiate plan and interventions as ordered    - [place] patient to unit/surroundings  - Explain treatment plan  - Encourage participation in care  - Encourage verbalization of concerns/fears  - Identify coping mechanisms  - Assist in developing anxiety-reducing skills  - Administer/offer alternative therapies  - Limit or eliminate stimulants  Outcome: Progressing     Problem: Alteration in Orientation  Goal: Interact with staff daily  Description  Interventions:  - Assess and re-assess patient's level of orientation  - Engage patient in 1 on 1 interactions, daily, for a minimum of 15 minutes   - Establish rapport/trust with patient   Outcome: Progressing     Problem: SAFETY ADULT  Goal: Patient will remain free of falls  Description  INTERVENTIONS:  - Assess patient frequently for physical needs  -  Identify cognitive and physical deficits and behaviors that affect risk of falls  -  [place] fall precautions as indicated by assessment   - Educate patient/family on patient safety including physical limitations  - Instruct patient to call for assistance with activity based on assessment  - Modify environment to reduce risk of injury  - Consider OT/PT consult to assist with strengthening/mobility  Outcome: Progressing     Problem: RESPIRATORY - ADULT  Goal: Achieves optimal ventilation and oxygenation  Description  INTERVENTIONS:  - Assess for changes in respiratory status  - Assess for changes in mentation and behavior  - Position to facilitate oxygenation and minimize respiratory effort  - Oxygen administration by appropriate delivery method based on oxygen saturation (per order) or ABGs  - Initiate smoking cessation education as indicated  - Encourage broncho-pulmonary hygiene including cough, deep breathe, Incentive Spirometry  - Assess the need for suctioning and aspirate as needed  - Assess and instruct to report SOB or any respiratory difficulty  - Respiratory Therapy support as indicated  Outcome: Yaquelin Delong has been in her room most of the shift  Quiet and keeps to herself most of the time  Denies anxiety and depression  Social with select peers  Able to make needs known to staff  Ate 100% of her meal  Did not shower this evening   Likes to lay in bed  Needs encouragement to come out of there room  Did not attend any groups this evening  Came on her own for HS medication  Ate snack before going to her room  Oxygen 1 liter nasal cannula at night  Continue to monitor  Precautions maintained  CBC with Diff to be drawn in the morning

## 2019-08-02 NOTE — PROGRESS NOTES
08/02/19 0900   Team Meeting   Meeting Type Daily Rounds   Team Members Present   Team Members Present Nurse;; Other (Discipline and Name)   Nursing Team Member Victor Manuel Ceron RN   Care Management Team Member Brenda Pham   Other (Discipline and Name) Brant Bone [place]; SHANIKA Meza     Did not attend groups yesterday  Still preoccupied and somatic regarding her breathing and her oxygen use  Slept

## 2019-08-02 NOTE — PLAN OF CARE
Problem: PAIN - ADULT  Goal: Verbalizes/displays adequate comfort level or baseline comfort level  Description  Interventions:  - Encourage patient to monitor pain and request assistance  - Assess pain using appropriate pain scale  - Administer analgesics based on type and severity of pain and evaluate response  - Implement non-pharmacological measures as appropriate and evaluate response  - Consider cultural and social influences on pain and pain management  - Notify physician/advanced practitioner if interventions unsuccessful or patient reports new pain  Outcome: Progressing     Problem: SAFETY ADULT  Goal: Patient will remain free of falls  Description  INTERVENTIONS:  - Assess patient frequently for physical needs  -  Identify cognitive and physical deficits and behaviors that affect risk of falls    -  [place] fall precautions as indicated by assessment   - Educate patient/family on patient safety including physical limitations  - Instruct patient to call for assistance with activity based on assessment  - Modify environment to reduce risk of injury  - Consider OT/PT consult to assist with strengthening/mobility  Outcome: Progressing     Problem: RESPIRATORY - ADULT  Goal: Achieves optimal ventilation and oxygenation  Description  INTERVENTIONS:  - Assess for changes in respiratory status  - Assess for changes in mentation and behavior  - Position to facilitate oxygenation and minimize respiratory effort  - Oxygen administration by appropriate delivery method based on oxygen saturation (per order) or ABGs  - Initiate smoking cessation education as indicated  - Encourage broncho-pulmonary hygiene including cough, deep breathe, Incentive Spirometry  - Assess the need for suctioning and aspirate as needed  - Assess and instruct to report SOB or any respiratory difficulty  - Respiratory Therapy support as indicated  Outcome: Progressing     ~Maggie maintained on ongoing fall and SAFE precaution without incident on this shift    Laying in bed with eyes closed, breath even and unlabored, with 02 @1l/m via nasal cannula for COPD   No respiratory distress noted  Jola Bernheim will continue to remain SAFE and free from fall while residing on EAC      Q 15 minutes checks continues during rounds   No indication of pain or discomfort noted   Will continue to monitor sleep and behavioral pattern   ~Maggie has a scheduled lab: CBC w/Diff to be drawn tomorrow in the AM

## 2019-08-02 NOTE — PROGRESS NOTES
Psychiatry Progress Note    Subjective: Interval History     Davison Philipp was interviewed while she was laying in bed  She indicates she is adjusting well to the milieu  She denies any current depression or anxiety  She slept well last night  She reports attending the 11 am group today and states she was planning to attend the 3:30 pm group, but "it wasn't announced overhead and I only attend groups that are announced in advance " She remains somatically preoccupied at times, using her health concerns as the reason she cannot participate in groups or unit programming  She denies any homicidal or suicidal ideations       Behavior over the last 24 hours:  unchanged  Sleep: normal  Appetite: normal  Medication side effects: No  ROS: no complaints    Current medications:    Current Facility-Administered Medications:     acetaminophen (TYLENOL) tablet 650 mg, 650 mg, Oral, Q6H PRN, Dalton Garner MD    acetaminophen (TYLENOL) tablet 650 mg, 650 mg, Oral, Q6H PRN, Dalton Garner MD    acetaminophen (TYLENOL) tablet 650 mg, 650 mg, Oral, Q6H PRN, Dalton Garner MD    albuterol (PROVENTIL HFA,VENTOLIN HFA) inhaler 2 puff, 2 puff, Inhalation, Q4H PRN, Dalton Garner MD, 2 puff at 07/25/19 1200    benzonatate (TESSALON PERLES) capsule 100 mg, 100 mg, Oral, TID PRN, Dalton Garner MD    cloZAPine (CLOZARIL) tablet 100 mg, 100 mg, Oral, HS, Dalton Garner MD, 100 mg at 08/01/19 2052    FLUoxetine (PROzac) capsule 10 mg, 10 mg, Oral, Daily, Dalton Garner MD, 10 mg at 08/02/19 0909    fluticasone-vilanterol (BREO ELLIPTA) 200-25 MCG/INH inhaler 1 puff, 1 puff, Inhalation, Daily, Dalton Garner MD, 1 puff at 08/02/19 0910    levothyroxine tablet 125 mcg, 125 mcg, Oral, Early Morning, Dalton Garner MD, 125 mcg at 08/02/19 0533    OLANZapine (ZyPREXA) tablet 5 mg, 5 mg, Oral, Q8H PRN, Dalton Garner MD    pantoprazole (PROTONIX) EC tablet 40 mg, 40 mg, Oral, Early Morning, Dalton Garner MD, 40 mg at 08/02/19 0533    theophylline (JEF-24) 24 hr capsule 200 mg, 200 mg, Oral, Daily, Jeffrey Gallegos MD, 200 mg at 08/02/19 0909    traZODone (DESYREL) tablet 25 mg, 25 mg, Oral, HS PRN, Jeffrey Gallegos MD    Current Problem List:    Patient Active Problem List   Diagnosis    Sepsis [facility] [address]    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    Tobacco use disorder, continuous    [facility]    Left hip pain    Lactic acidosis    Hypokalemia    Hypomagnesemia    Compression fracture of L4 lumbar vertebra    Thoracic compression fracture (HCC)    Ventral hernia    Parapneumonic effusion    Acute on chronic respiratory failure with hypoxia (HCC)    Chronic respiratory failure (HCC)    Hypophosphatemia    Elevated MCV    Compression deformity of vertebra    Schizoaffective disorder, bipolar type (HCC)    Acquired hypothyroidism    Gastroesophageal reflux disease without esophagitis    Abnormal CT of the chest    Excessive cerumen in left ear canal    Lipoma of right upper extremity    Polydipsia    Localized swelling of both lower legs    Noncompliant with deep vein thrombosis (DVT) prophylaxis    Allergic conjunctivitis of right eye       Problem list reviewed 08/02/19     Objective:     Vital Signs:  Vitals:    08/01/19 0700 08/01/19 0900 08/01/19 1500 08/02/19 0730   BP: (!) 84/53 92/54 114/64 137/77   BP Location: Left arm Right arm Right arm Right arm   Pulse: (!) 111 98 65 91   Resp: 22 18  18   Temp: (!) 97 4 °F (36 3 °C)   (!) 97 2 °F (36 2 °C)   TempSrc: Temporal      SpO2: 96% 95% 92%    Weight:       Height:             Appearance:  age appropriate and casually dressed   Behavior:  uncooperative   Speech:  normal volume   Mood:  anxious and irritable   Affect:  constricted   Thought Process:  normal   Thought Content:  delusions  persecutory   Perceptual Disturbances: None   Risk Potential: none   Sensorium:  person, place, situation and time   Cognition:  intact   Consciousness:  alert and awake    Attention: attention span and concentration were age appropriate   Intellect: average   Insight:  limited   Judgment: limited      Motor Activity: no abnormal movements       I/O Past 24 hours:  No intake/output data recorded  No intake/output data recorded  Labs:  Reviewed 08/02/19      Assessment / Plan:     Schizoaffective disorder, bipolar type ([facility] [address] )    Recommended Treatment:      Medication changes:  1) continue current medication regimen  Non-pharmacological treatments  1) Continue with group therapy, milieu therapy and occupational therapy  Safety  1) Safety/communication plan established targeting dynamic risk factors above  2) Risks, benefits, and possible side effects of medications explained to patient and patient verbalizes understanding  Counseling / Coordination of Care    Total floor / unit time spent today 20 minutes  Greater than 50% of total time was spent with the patient and / or family counseling and / or coordination of care  A description of the counseling / coordination of care  Patient's Rights, confidentiality and exceptions to confidentiality, use of automated medical record, [facility] staff access to medical record, and consent to treatment reviewed      ABILIO Ariza

## 2019-08-02 NOTE — PLAN OF CARE
Problem: Alteration in Thoughts and Perception  Goal: Verbalize thoughts and feelings  Description  Interventions:  - Promote a nonjudgmental and trusting relationship with the patient through active listening and therapeutic communication  - Assess patient's level of functioning, behavior and potential for risk  - Engage patient in 1 on 1 interactions for a minimum of 15 minutes each session  - Encourage patient to express fears, feelings, frustrations, and discuss symptoms    - [place] patient to reality, help patient recognize reality-based thinking   - Administer medications as ordered and assess for potential side effects  - Provide the patient education related to the signs and symptoms of the illness and desired effects of prescribed medications  Outcome: Progressing  Goal: Agree to be compliant with medication regime, as prescribed and report medication side effects  Description  Interventions:  - Offer appropriate PRN medication and supervise ingestion; conduct aims, as needed   8/2/2019 1716 by Verónica Acharya RN  Outcome: Progressing  8/2/2019 1715 by Verónica Acharya RN  Outcome: Progressing     Problem: Ineffective Coping  Goal: Participates in unit activities  Description  Interventions:  - Provide therapeutic environment   - Provide required programming   - Redirect inappropriate behaviors   8/2/2019 1716 by Verónica Acharya RN  Outcome: Progressing  8/2/2019 1715 by Verónica Acharya RN  Outcome: Progressing     Problem: Risk for Self Injury/Neglect  Goal: Verbalize thoughts and feelings  Description  Interventions:  - Assess and re-assess patient's lethality and potential for self-injury  - Engage patient in 1:1 interactions, daily, for a minimum of 15 minutes  - Encourage patient to express feelings, fears, frustrations, hopes  - Establish rapport/trust with patient   Outcome: Progressing  Goal: Refrain from harming self  Description  Interventions:  - Monitor patient closely, per order  - Develop a trusting relationship  - Supervise medication ingestion, monitor effects and side effects   Outcome: Progressing  Goal: Attend and participate in unit activities, including therapeutic, recreational, and educational groups  Description  Interventions:  - Provide therapeutic and educational activities daily, encourage attendance and participation, and document same in the medical record  - Obtain collateral information, encourage visitation and family involvement in care   Outcome: Progressing  Goal: Complete daily ADLs, including personal hygiene independently, as able  Description  Interventions:  - Observe, teach, and assist patient with ADLS  - Monitor and promote a balance of rest/activity, with adequate nutrition and elimination  Outcome: Progressing     Problem: Depression  Goal: Refrain from isolation  Description  Interventions:  - Develop a trusting relationship   - Encourage socialization   Outcome: Progressing     Problem: SAFETY ADULT  Goal: Patient will remain free of falls  Description  INTERVENTIONS:  - Assess patient frequently for physical needs  -  Identify cognitive and physical deficits and behaviors that affect risk of falls    -  [place] fall precautions as indicated by assessment   - Educate patient/family on patient safety including physical limitations  - Instruct patient to call for assistance with activity based on assessment  - Modify environment to reduce risk of injury  - Consider OT/PT consult to assist with strengthening/mobility  8/2/2019 1716 by Briana Carmona RN  Outcome: Progressing  8/2/2019 1715 by Briana Carmona RN  Outcome: Progressing     Problem: RESPIRATORY - ADULT  Goal: Achieves optimal ventilation and oxygenation  Description  INTERVENTIONS:  - Assess for changes in respiratory status  - Assess for changes in mentation and behavior  - Position to facilitate oxygenation and minimize respiratory effort  - Oxygen administration by appropriate delivery method based on oxygen saturation (per order) or ABGs  - Initiate smoking cessation education as indicated  - Encourage broncho-pulmonary hygiene including cough, deep breathe, Incentive Spirometry  - Assess the need for suctioning and aspirate as needed  - Assess and instruct to report SOB or any respiratory difficulty  - Respiratory Therapy support as indicated  8/2/2019 1716 by Leslie Bello, RN  Outcome: Progressing  8/2/2019 1715 by Leslie Bello RN  Outcome: Progressing     Breakfast and lunch 100%  Groups IMR today  Fall Precautions  Compliant with routine medications and vital signs, no prompting needed, preoccupied with somatic complaints, more visible today but needed prompting and encouragement, showered with [facility] staff assist  [facility] 5 10 today

## 2019-08-03 PROCEDURE — 99231 SBSQ HOSP IP/OBS SF/LOW 25: CPT | Performed by: NURSE PRACTITIONER

## 2019-08-03 RX ADMIN — PANTOPRAZOLE SODIUM 40 MG: 40 TABLET, DELAYED RELEASE ORAL at 06:09

## 2019-08-03 RX ADMIN — FLUTICASONE FUROATE AND VILANTEROL TRIFENATATE 1 PUFF: 200; 25 POWDER RESPIRATORY (INHALATION) at 08:46

## 2019-08-03 RX ADMIN — FLUOXETINE 10 MG: 10 CAPSULE ORAL at 08:46

## 2019-08-03 RX ADMIN — THEOPHYLLINE ANHYDROUS 200 MG: 200 CAPSULE, EXTENDED RELEASE ORAL at 08:46

## 2019-08-03 RX ADMIN — CLOZAPINE 100 MG: 100 TABLET ORAL at 21:36

## 2019-08-03 RX ADMIN — LEVOTHYROXINE SODIUM 125 MCG: 125 TABLET ORAL at 06:09

## 2019-08-03 NOTE — PLAN OF CARE
Problem: Alteration in Thoughts and Perception  Goal: Agree to be compliant with medication regime, as prescribed and report medication side effects  Description  Interventions:  - Offer appropriate PRN medication and supervise ingestion; conduct aims, as needed   Outcome: Progressing     Problem: Alteration in Thoughts and Perception  Goal: Attend and participate in unit activities, including therapeutic, recreational, and educational groups  Description  Interventions:  - Provide therapeutic and educational activities daily, encourage attendance and participation, and document same in the medical record   Outcome: Not Progressing     Problem: Depression  Goal: Refrain from isolation  Description  Interventions:  - Develop a trusting relationship   - Encourage socialization   Outcome: Not Progressing     [address] Rena Rausch continues isolative to her room where sits on or lays in bed  She is pleasant, comes out for meal (ate 100%), for scheduled HS medicine when prompted, to get & have HS snack  Was praised for managing shower on 7-3 today without O2  "I should have had it, but, I took my time " Presented to her taking her time is the the prudent thing to do when one has lung issues, pace herself & then can manage as did today  She did not attend PM Groups  Is wearing the nasal O2 @ 1L via cannula/compressor for HS

## 2019-08-03 NOTE — PROGRESS NOTES
Progress Note - Behavioral Health   [facility]: [id number]  Unit/Bed#: MARCIO [facility]IR [facility] 110-02 Encounter: [id number]    Staff reports the patient remains somatic and somewhat seclusive to her room  She has been encouraged by staff to come out for groups and medications  She is currently sleeping and would not wake up to be interviewed  Mental Status Evaluation:  Appearance:  disheveled   Behavior:  asleep   Mood:  Uncertain   Affect: asleep   Speech: asleep   Thought Process:  Unable to assess due to scant verbalization   Thought Content:  Cannot be assessed due to patient factors   Perceptual Disturbances: Cannot be assessed due to patient factors   Risk Potential: Unable to assess due to patient factors   Attention/Concentration Inattentive   Orientation:   Unable to assess   Gait/Station: Not observed and  needs assistive device   Motor Activity: No abnormal movement noted     Progress Toward Goals:  No change    Assessment/Plan    Principal Problem:    Schizoaffective disorder, bipolar type (HCC)  Active Problems:    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[address] )    Acquired hypothyroidism    Gastroesophageal reflux disease without esophagitis      Recommended Treatment: Continue with pharmacotherapy, group therapy, milieu therapy and occupational therapy    The patient will be maintained on the following medications:    Current Facility-Administered Medications:  acetaminophen 650 mg Oral Q6H PRN Meredith Wesley MD   acetaminophen 650 mg Oral Q6H PRN Meredith Wesley MD   acetaminophen 650 mg Oral Q6H PRN Meredith Wesley MD   albuterol 2 puff Inhalation Q4H PRN Meredith Wesley MD   benzonatate 100 mg Oral TID PRN Meredith Wesley MD   cloZAPine 100 mg Oral HS Meredith Wesley MD   FLUoxetine 10 mg Oral Daily Meredith Wesley MD   fluticasone-vilanterol 1 puff Inhalation Daily Meredith Wesley MD   levothyroxine 125 mcg Oral Early Morning Meredith Wesley MD   OLANZapine 5 mg Oral Q8H PRN Meredith Wesley MD   pantoprazole 40 mg Oral Early Morning Surya Drake Israel Hughes MD   theophylline 200 mg Oral Daily Teri Huggins MD   traZODone 25 mg Oral HS PRN Teri Huggins MD       Risks, benefits and possible side effects of Medications:   Patient does not verbalize understanding at this time and will require further explanation

## 2019-08-03 NOTE — PLAN OF CARE
Problem: Alteration in Thoughts and Perception  Goal: Treatment Goal: Gain control of psychotic behaviors/thinking, reduce/eliminate presenting symptoms and demonstrate improved reality functioning upon discharge  Outcome: Progressing  Goal: Verbalize thoughts and feelings  Description  Interventions:  - Promote a nonjudgmental and trusting relationship with the patient through active listening and therapeutic communication  - Assess patient's level of functioning, behavior and potential for risk  - Engage patient in 1 on 1 interactions for a minimum of 15 minutes each session  - Encourage patient to express fears, feelings, frustrations, and discuss symptoms    - [place] patient to reality, help patient recognize reality-based thinking   - Administer medications as ordered and assess for potential side effects  - Provide the patient education related to the signs and symptoms of the illness and desired effects of prescribed medications  Outcome: Progressing  Goal: Agree to be compliant with medication regime, as prescribed and report medication side effects  Description  Interventions:  - Offer appropriate PRN medication and supervise ingestion; conduct aims, as needed   Outcome: Progressing   Patient is pleasant and cooperative this morning  She is mostly Isolative to her room but does come out for medications and meals when prompted  She sleeps most of the day  No group attendance noted  She has not voiced any somatic complaints this shift  Continue to monitor

## 2019-08-04 PROCEDURE — 99231 SBSQ HOSP IP/OBS SF/LOW 25: CPT | Performed by: NURSE PRACTITIONER

## 2019-08-04 RX ADMIN — FLUTICASONE FUROATE AND VILANTEROL TRIFENATATE 1 PUFF: 200; 25 POWDER RESPIRATORY (INHALATION) at 08:44

## 2019-08-04 RX ADMIN — CLOZAPINE 100 MG: 100 TABLET ORAL at 21:29

## 2019-08-04 RX ADMIN — THEOPHYLLINE ANHYDROUS 200 MG: 200 CAPSULE, EXTENDED RELEASE ORAL at 08:44

## 2019-08-04 RX ADMIN — LEVOTHYROXINE SODIUM 125 MCG: 125 TABLET ORAL at 06:08

## 2019-08-04 RX ADMIN — PANTOPRAZOLE SODIUM 40 MG: 40 TABLET, DELAYED RELEASE ORAL at 06:08

## 2019-08-04 RX ADMIN — FLUOXETINE 10 MG: 10 CAPSULE ORAL at 08:44

## 2019-08-04 NOTE — PLAN OF CARE
Problem: Alteration in Thoughts and Perception  Goal: Agree to be compliant with medication regime, as prescribed and report medication side effects  Description  Interventions:  - Offer appropriate PRN medication and supervise ingestion; conduct aims, as needed   Outcome: Progressing     Problem: Ineffective Coping  Goal: Participates in unit activities  Description  Interventions:  - Provide therapeutic environment   - Provide required programming   - Redirect inappropriate behaviors   Outcome: Not Progressing   Patient is pleasant and cooperative this morning  She is mostly Isolative to her room but does come out for medications and meals when prompted  She sleeps most of the day  No group attendance noted  Slightly somatic regarding her SOB  Continue to monitor

## 2019-08-04 NOTE — PROGRESS NOTES
Progress Note - Behavioral Health   Grecia Guaning 60 y o  female MRN: [id number]  Unit/Bed#: [facility]ARCIO ADA[facility] 110-02 Encounter: [id number]    The patient is currently lying in her bed and is disheveled  She brightens and is friendly on approach  She states she is a little bit depressed because she feels like she has nowhere to go after discharge  She said even though things did not work out at [address] she is thinking about going back there  She did have multiple complaints that they do not clean the rooms or take care of her well there  She states she has an appetite but she is tired of the repetitive food  She is sleeping at night  States her auditory hallucinations are not there at all anymore  Denies SI  Mental Status Evaluation:  Appearance:  Good eye contact and disheveled   Behavior:  cooperative and friendly   Mood:  depressed   Affect: appropriate   Speech: Normal rate and Normal volume   Thought Process:  Goal directed and coherent   Thought Content:  Does not verbalize delusional material   Perceptual Disturbances: Denies hallucinations and does not appear to be responding to internal stimuli   Risk Potential: No suicidal or homicidal ideation   Attention/Concentration Age appropriate   Orientation:   Oriented x3   Gait/Station: Not observed   Motor Activity: No abnormal movement noted     Progress Toward Goals:  No significant change in the last 24 hours    Assessment/Plan    Principal Problem:    Schizoaffective disorder, bipolar type ([facility])  Active Problems:    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[address] )    Acquired hypothyroidism    Gastroesophageal reflux disease without esophagitis      Recommended Treatment: Continue with pharmacotherapy, group therapy, milieu therapy and occupational therapy    The patient will be maintained on the following medications:    Current Facility-Administered Medications:  acetaminophen 650 mg Oral Q6H PRN Shilpa Trujillo MD   acetaminophen 650 mg Oral Q6H PRN Shilpa Trujillo MD   acetaminophen 650 mg Oral Q6H PRN Deedee Muir MD   albuterol 2 puff Inhalation Q4H PRN Deedee Muir MD   benzonatate 100 mg Oral TID PRN Deedee Muir MD   cloZAPine 100 mg Oral HS Deedee Muir MD   FLUoxetine 10 mg Oral Daily Deedee Muir MD   fluticasone-vilanterol 1 puff Inhalation Daily Deedee Muir MD   levothyroxine 125 mcg Oral Early Morning Deedee Muir MD   OLANZapine 5 mg Oral Q8H PRN Deedee Muir MD   pantoprazole 40 mg Oral Early Morning Deedee Muir MD   theophylline 200 mg Oral Daily Deedee Muir MD   traZODone 25 mg Oral HS PRN Deedee Muir MD       Risks, benefits and possible side effects of Medications:   Patient does not verbalize understanding at this time and will require further explanation

## 2019-08-04 NOTE — PLAN OF CARE
Problem: Alteration in Thoughts and Perception  Goal: Agree to be compliant with medication regime, as prescribed and report medication side effects  Description  Interventions:  - Offer appropriate PRN medication and supervise ingestion; conduct aims, as needed   Outcome: Progressing     Problem: RESPIRATORY - ADULT  Goal: Achieves optimal ventilation and oxygenation  Description  INTERVENTIONS:  - Assess for changes in respiratory status  - Assess for changes in mentation and behavior  - Position to facilitate oxygenation and minimize respiratory effort  - Oxygen administration by appropriate delivery method based on oxygen saturation (per order) or ABGs  - Initiate smoking cessation education as indicated  - Encourage broncho-pulmonary hygiene including cough, deep breathe, Incentive Spirometry  - Assess the need for suctioning and aspirate as needed  - Assess and instruct to report SOB or any respiratory difficulty  - Respiratory Therapy support as indicated  Outcome: Progressing     Problem: Alteration in Thoughts and Perception  Goal: Attend and participate in unit activities, including therapeutic, recreational, and educational groups  Description  Interventions:  - Provide therapeutic and educational activities daily, encourage attendance and participation, and document same in the medical record   Outcome: Not Progressing     Problem: Depression  Goal: Refrain from isolation  Description  Interventions:  - Develop a trusting relationship   - Encourage socialization   Outcome: Not Progressing     [address] is somatic; called the nurse to the room early in shift because awoke feeling SOB, panicky; when sat up it passed  PO2 @ 1655 was 95%  Reports does have "GERD" & ate [place], breaded chicken tenders for lunch  Denied anxiety, even though, was expecting visit from brother  Did come out for meal, ate 100%  Was visited by brother Reagan Moe w/warm interactions   He plans to visit tomorrow @ 1400 to review the admission signature papers w/her so she can sign as the sister she hoped could do this will be momentarily enroute to [place] on vacation  Sandy Nichols explains part of the issue is her vision, cataract in R eye, can't see well enough to read ) He may also call in @ Treatment Team on Monday, was given the phone #  Staci Baum recognizes Treatment Team will likely ask her to participate in a additional group this coming week & is mulling over which she will choose  Interested in seeing an eye doctor to assess her vision, options  Agrees to present needs, wishes @ Treatment Team  Did not attend PM Group  Did come out for HS snack, to get HS medicine  PO2 on room air prior to application of HS O2 @ 1L nasally was 94%

## 2019-08-05 PROCEDURE — 99232 SBSQ HOSP IP/OBS MODERATE 35: CPT | Performed by: PSYCHIATRY & NEUROLOGY

## 2019-08-05 RX ADMIN — FLUTICASONE FUROATE AND VILANTEROL TRIFENATATE 1 PUFF: 200; 25 POWDER RESPIRATORY (INHALATION) at 08:55

## 2019-08-05 RX ADMIN — FLUOXETINE 10 MG: 10 CAPSULE ORAL at 08:55

## 2019-08-05 RX ADMIN — PANTOPRAZOLE SODIUM 40 MG: 40 TABLET, DELAYED RELEASE ORAL at 06:21

## 2019-08-05 RX ADMIN — CLOZAPINE 125 MG: 25 TABLET ORAL at 21:39

## 2019-08-05 RX ADMIN — LEVOTHYROXINE SODIUM 125 MCG: 125 TABLET ORAL at 06:21

## 2019-08-05 RX ADMIN — THEOPHYLLINE ANHYDROUS 200 MG: 200 CAPSULE, EXTENDED RELEASE ORAL at 08:55

## 2019-08-05 NOTE — PLAN OF CARE
Problem: Alteration in Thoughts and Perception  Goal: Agree to be compliant with medication regime, as prescribed and report medication side effects  Description  Interventions:  - Offer appropriate PRN medication and supervise ingestion; conduct aims, as needed   Outcome: Progressing     Problem: PAIN - ADULT  Goal: Verbalizes/displays adequate comfort level or baseline comfort level  Description  Interventions:  - Encourage patient to monitor pain and request assistance  - Assess pain using appropriate pain scale  - Administer analgesics based on type and severity of pain and evaluate response  - Implement non-pharmacological measures as appropriate and evaluate response  - Consider cultural and social influences on pain and pain management  - Notify physician/advanced practitioner if interventions unsuccessful or patient reports new pain  Outcome: Progressing     Problem: SAFETY ADULT  Goal: Patient will remain free of falls  Description  INTERVENTIONS:  - Assess patient frequently for physical needs  -  Identify cognitive and physical deficits and behaviors that affect risk of falls    -  [place] fall precautions as indicated by assessment   - Educate patient/family on patient safety including physical limitations  - Instruct patient to call for assistance with activity based on assessment  - Modify environment to reduce risk of injury  - Consider OT/PT consult to assist with strengthening/mobility  Outcome: Progressing     Problem: RESPIRATORY - ADULT  Goal: Achieves optimal ventilation and oxygenation  Description  INTERVENTIONS:  - Assess for changes in respiratory status  - Assess for changes in mentation and behavior  - Position to facilitate oxygenation and minimize respiratory effort  - Oxygen administration by appropriate delivery method based on oxygen saturation (per order) or ABGs  - Initiate smoking cessation education as indicated  - Encourage broncho-pulmonary hygiene including cough, deep breathe, Incentive Spirometry  - Assess the need for suctioning and aspirate as needed  - Assess and instruct to report SOB or any respiratory difficulty  - Respiratory Therapy support as indicated  Outcome: Progressing   The patient slept through the night with no behaviors or issues noted  No s/s respiratory distress noted  Oxygen maintained at 1 liter via nasal cannula  Fall precautions maintained  Med compliant  Continue to monitor

## 2019-08-05 NOTE — PROGRESS NOTES
08/05/19 0900   Team Meeting   Meeting Type Daily Rounds   Team Members Present   Team Members Present Physician;Nurse;; Other (Discipline and Name)   Physician Team Member Dr Sulma Tan Team Member Joseph Rosenthal RN   Care Management Team Member Brenda Fallon   Other (Discipline and Name) [facility]; SHANIKA Sharma     Patient's brother visited with her over the weekend  Patient agreed to sign the admission papers  Slept

## 2019-08-05 NOTE — PLAN OF CARE
Problem: RESPIRATORY - ADULT  Goal: Achieves optimal ventilation and oxygenation  Description  INTERVENTIONS:  - Assess for changes in respiratory status  - Assess for changes in mentation and behavior  - Position to facilitate oxygenation and minimize respiratory effort  - Oxygen administration by appropriate delivery method based on oxygen saturation (per order) or ABGs  - Initiate smoking cessation education as indicated  - Encourage broncho-pulmonary hygiene including cough, deep breathe, Incentive Spirometry  - Assess the need for suctioning and aspirate as needed  - Assess and instruct to report SOB or any respiratory difficulty  - Respiratory Therapy support as indicated  Outcome: Progressing     Problem: Alteration in Thoughts and Perception  Goal: Complete daily ADLs, including personal hygiene independently, as able  Description  Interventions:  - Observe, teach, and assist patient with ADLS  - Monitor and promote a balance of rest/activity, with adequate nutrition and elimination   Outcome: Not Progressing     Problem: Ineffective Coping  Goal: Demonstrates healthy coping skills  Outcome: Not Progressing     Problem: Depression  Goal: Refrain from isolation  Description  Interventions:  - Develop a trusting relationship   - Encourage socialization   Outcome: Not Progressing     Problem: Anxiety  Goal: Anxiety is at manageable level  Description  Interventions:  - Assess and monitor patient's anxiety level  - Monitor for signs and symptoms of anxiety both physical and emotional (heart palpitations, chest pain, shortness of breath, headaches, nausea, feeling jumpy, restlessness, irritable, apprehensive)  - Collaborate with interdisciplinary team and initiate plan and interventions as ordered    - [place] patient to unit/surroundings  - Explain treatment plan  - Encourage participation in care  - Encourage verbalization of concerns/fears  - Identify coping mechanisms  - Assist in developing anxiety-reducing skills  - Administer/offer alternative therapies  - Limit or eliminate stimulants  Outcome: Not Progressing     [address] is isolative to her room & bed  She is somatic thinking she isn't breathing well, describes "tautness" in her chest that is unaffected by respirations, present "all weekend" too  Skin warm & dry, respirations unlabored  She was encouraged to get up from bed, make effort to walk a little (I e  From room to dining room, sit 5' & then return to room, sit 5' & repeat), sit up w/peers in dining room to socialize  Teaching done about potential to develop pneumonia or blood clot from laying in bed all day  Says was agreeable, but, has not followed through beyond coming out to have meal (ate 75 %)  Has not groomed or dressed for the day  "I didn't feel like it today " Is perusing group schedule; says was told in Treatment Team to pick two more groups/day to attend  Encouraged her w/this endeavor
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
## 2019-08-05 NOTE — PROGRESS NOTES
Progress Note - Drew Jefferson 1955, 60 y o  female MRN: [id number]    [facility] [address] Encounter: [id number]    Primary Care Provider: Vianey Lao MD   Date and time admitted to hospital: 7/23/2019  5:30 PM        * Schizoaffective disorder, bipolar type [facility])  Assessment & Plan  Psychiatry Progress Note    Subjective: Interval History     Patient is now attending some of the groups and prefers to lay back on bed excusing herself for being on nasal oxygen demanding to be on it at all times  Her pulse ox has been quite acceptable but she believes that her being on nasal oxygen  hinders her from attending groups despite having the ability to use a portable oxygen tank and her group attendance is only 18% last week     She is still preoccupied with the need for oxygen     She did finally  sign admission papers after her brother came and visted with her over the weekend on the unit  She is still insisting that she cannot breathe or take showers on her own despite staff offering support and assistance even though pulse ox is acceptable most of the time  She still appears to have some paranoia about the staff and she is again refusing to take the Atrovent claiming that there is "peanut oil in it  She has been sleeping well and has been using the nasal oxygen at night  She is compliant with medications  No current suicidal homicidal thoughts intent or plans reported  She is casually dressed fairly groomed but is guarded suspicious evasive been in denial of her illness  No overt hallucinations reported other than some underlying paranoid delusions and beliefs that there is a micro chip implanted on her left forearm pointing to the 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    Compliant with medications including the recent increase in clozapine so far with no signs or sxs of agranulocytosis or myocarditis or endocarditis and she is moving her bowels so far    Current medications:    Current Facility-Administered Medications:     acetaminophen (TYLENOL) tablet 650 mg, 650 mg, Oral, Q6H PRN, Chichi Lockett MD    acetaminophen (TYLENOL) tablet 650 mg, 650 mg, Oral, Q6H PRN, Chichi Lockett MD    acetaminophen (TYLENOL) tablet 650 mg, 650 mg, Oral, Q6H PRN, Chichi Lockett MD    albuterol (PROVENTIL HFA,VENTOLIN HFA) inhaler 2 puff, 2 puff, Inhalation, Q4H PRN, Chichi Lockett MD, 2 puff at 07/25/19 1200    benzonatate (TESSALON PERLES) capsule 100 mg, 100 mg, Oral, TID PRN, Chichi Lockett MD    cloZAPine (CLOZARIL) tablet 100 mg, 100 mg, Oral, HS, Chichi Lockett MD, 100 mg at 08/04/19 2129    FLUoxetine (PROzac) capsule 10 mg, 10 mg, Oral, Daily, Chichi Lockett MD, 10 mg at 08/05/19 0855    fluticasone-vilanterol (BREO ELLIPTA) 200-25 MCG/INH inhaler 1 puff, 1 puff, Inhalation, Daily, Chichi Lockett MD, 1 puff at 08/05/19 0855    levothyroxine tablet 125 mcg, 125 mcg, Oral, Early Morning, Chichi Lockett MD, 125 mcg at 08/05/19 6514    OLANZapine (ZyPREXA) tablet 5 mg, 5 mg, Oral, Q8H PRN, Chichi Lockett MD    pantoprazole (PROTONIX) EC tablet 40 mg, 40 mg, Oral, Early Morning, Chichi Lockett MD, 40 mg at 08/05/19 4845    theophylline (JEF-24) 24 hr capsule 200 mg, 200 mg, Oral, Daily, Chichi Lockett MD, 200 mg at 08/05/19 0855    traZODone (DESYREL) tablet 25 mg, 25 mg, Oral, HS PRN, Chichi Lockett MD  Justification if on more than two antipsychotics:  Only on his clozapine  Side effects if any:  None    Risks , benefits, side effects and precautions of medications discussed with patient and reminded patient to let us know any problems with medications     Objective:     Vital Signs:  Vitals:    08/04/19 0733 08/04/19 2130 08/05/19 0730 08/05/19 0732   BP: 108/69  110/62 113/62   BP Location:   Left arm Left arm   Pulse: 92  83 103   Resp:   17    Temp:   98 1 °F (36 7 °C)    TempSrc:   Temporal    SpO2:  98%     Weight:       Height:         Appearance:  age appropriate, casually dressed and overweight older than stated age   Behavior:  deviant, evasive and guarded and suspicious but with good eye contact   Speech:  normal pitch and normal volume but tends to become loud at times   Mood:  anxious and dysthymic   Affect:  mood-congruent   Thought Process:  goal directed and illogical slightly pressured but able to be redirected   Thought Content:  delusions  grandiose and somatic about not being able to breathe despite being on nasal oxygen and paranoid about people out to harm her and Atrovent inhaler tainteded with peanut oil  No current suicidal homicidal thoughts intent or plans reported  No phobias obsessions or compulsions reported   Perceptual Disturbances: None and does not appear responding to internal stimuli   Risk Potential: Tendency to not care for herself if she goes off treatment   Sensorium:  person, place, time/date, situation, day of week, month of year and time   Cognition:  grossly intact   Consciousness:  alert and awake    Attention: Intact concentration and attention span   Intellect: Considered to be at least of average intelligence   Insight:  limited in denial   Judgment: poor      Motor Activity: no abnormal movements         Recent Labs:  Results Reviewed     None          I/O Past 24 hours:  No intake/output data recorded  No intake/output data recorded          Assessment / Plan:     [facility]ffect[facility] )      Reason for continued inpatient care:  Because of underlying paranoia and refusal to go back to the personal care home and inability to care for herself on her own  Acceptance by patient:  Accepting  Filomena Powell in recovery:  About living in another personal care home once she feels better  Understanding of medications :  Has some understanding  Involved in reintegration process:  Adjusting to the unit  Trusting in relatoinship with psychiatrist:  Trusting    Recommended Treatment:    Medication changes:  1)increase the  clozapine as per titration to reach therapeutic dosage as blood work has been acceptable  Non-pharmacological treatments  1) start individual therapy group therapy, milieu therapy and occupational therapy and milieu therapy involving multidisciplinary team approach with psychotherapist, case management, nursing, peer support services, art therapist etc using recovery principles and psycho-education about accepting illness and need for treatment   Safety  1) Safety/communication plan established targeting dynamic risk factors above  Discharge Plan most likely back to the a:  Personal care boarding home with act services once her delusions are managed and mood becomes more stabilized and she becomes more receptive to treatment compliance    Counseling / Coordination of Care    Total floor / unit time spent today 15 minutes  Greater than 50% of total time was spent with the patient and / or family counseling and / or coordination of care  A description of the counseling / coordination of care  Patient's Rights, confidentiality and exceptions to confidentiality, use of automated medical record, [employer] [facility] staff access to medical record, and consent to treatment reviewed      Shilpa rTujillo MD

## 2019-08-05 NOTE — PROGRESS NOTES
08/05/19 1000   Team Meeting   Meeting Type Tx Team Meeting   Initial Conference Date 08/05/19   Next Conference Date 08/12/19   Team Members Present   Team Members Present Physician;Nurse;; Other (Discipline and Name)   Physician Team Member Dr Jackie Hernadez Team Member Jesus Angel, LISA   Care Management Team Member Brenda Torre   Other (Discipline and Name) Lindsey Jalloh - Therapist; SHANIKA Hadley - Certified Peer   Patient/Family Present   Patient Present Yes   Patient's Family Present No     Patient attended treatment team meeting this morning without self-assessment  Patient's group attendance for last week was 18%  Patient's main barrier is complaints related to her need for oxygen  Patient attributes her poor participating in groups to her oxygen, and does have complaints about showering with the oxygen  Patient agrees that her fixation with oxygen is exacerbated by her anxiety  Team attempting to show her that she can participate in programming and in life in general with the oxygen  Patient's brother visited yesterday which patient was happy about  She gave  his name and number to be "a part of the treatment team "  Team and patient completed risk assessment and the patient did not verbalize any desire to elope from the program   Patient verbalized understanding of consequences of eloping from treatment while on a commitment  Patient verbalized no further questions or concerns at the conclusion of the meeting  Next team meeting scheduled for 8/12

## 2019-08-05 NOTE — ASSESSMENT & PLAN NOTE
Psychiatry Progress Note    Subjective: Interval History     Patient is now attending some of the groups and prefers to lay back on bed excusing herself for being on nasal oxygen demanding to be on it at all times  Her pulse ox has been quite acceptable but she believes that her being on nasal oxygen  hinders her from attending groups despite having the ability to use a portable oxygen tank and her group attendance is only 18% last week     She is still preoccupied with the need for oxygen     She did finally  sign admission papers after her brother came and visted with her over the weekend on the unit  She is still insisting that she cannot breathe or take showers on her own despite staff offering support and assistance even though pulse ox is acceptable most of the time  She still appears to have some paranoia about the staff and she is again refusing to take the Atrovent claiming that there is "peanut oil in it  She has been sleeping well and has been using the nasal oxygen at night  She is compliant with medications  No current suicidal homicidal thoughts intent or plans reported  She is casually dressed fairly groomed but is guarded suspicious evasive been in denial of her illness  No overt hallucinations reported other than some underlying paranoid delusions and beliefs that there is a micro chip implanted on her left forearm pointing to the 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    Compliant with medications including the recent increase in clozapine so far with no signs or sxs of agranulocytosis or myocarditis or endocarditis and she is moving her bowels so far    Current medications:    Current Facility-Administered Medications:     acetaminophen (TYLENOL) tablet 650 mg, 650 mg, Oral, Q6H PRN, Tree Maddne MD    acetaminophen (TYLENOL) tablet 650 mg, 650 mg, Oral, Q6H PRN, Tree Madden MD    acetaminophen (TYLENOL) tablet 650 mg, 650 mg, Oral, Q6H PRN, Tree Madden MD    albuterol (PROVENTIL HFA,VENTOLIN HFA) inhaler 2 puff, 2 puff, Inhalation, Q4H PRN, Alirio Frazier MD, 2 puff at 07/25/19 1200    benzonatate (TESSALON PERLES) capsule 100 mg, 100 mg, Oral, TID PRN, Alirio Frazier MD    cloZAPine (CLOZARIL) tablet 100 mg, 100 mg, Oral, HS, Alirio Frazier MD, 100 mg at 08/04/19 2129    FLUoxetine (PROzac) capsule 10 mg, 10 mg, Oral, Daily, Alirio Frazier MD, 10 mg at 08/05/19 0855    fluticasone-vilanterol (BREO ELLIPTA) 200-25 MCG/INH inhaler 1 puff, 1 puff, Inhalation, Daily, Alirio Frazier MD, 1 puff at 08/05/19 0855    levothyroxine tablet 125 mcg, 125 mcg, Oral, Early Morning, Alirio Frazier MD, 125 mcg at 08/05/19 6950    OLANZapine (ZyPREXA) tablet 5 mg, 5 mg, Oral, Q8H PRN, Alirio Frazier MD    pantoprazole (PROTONIX) EC tablet 40 mg, 40 mg, Oral, Early Morning, Alirio Frazier MD, 40 mg at 08/05/19 6902    theophylline (JEF-24) 24 hr capsule 200 mg, 200 mg, Oral, Daily, Alirio Frazier MD, 200 mg at 08/05/19 0855    traZODone (DESYREL) tablet 25 mg, 25 mg, Oral, HS PRN, Alirio Frazier MD  Justification if on more than two antipsychotics:  Only on his clozapine  Side effects if any:  None    Risks , benefits, side effects and precautions of medications discussed with patient and reminded patient to let us know any problems with medications     Objective:     Vital Signs:  Vitals:    08/04/19 0733 08/04/19 2130 08/05/19 0730 08/05/19 0732   BP: 108/69  110/62 113/62   BP Location:   Left arm Left arm   Pulse: 92  83 103   Resp:   17    Temp:   98 1 °F (36 7 °C)    TempSrc:   Temporal    SpO2:  98%     Weight:       Height:         Appearance:  age appropriate, casually dressed and overweight older than stated age   Behavior:  deviant, evasive and guarded and suspicious but with good eye contact   Speech:  normal pitch and normal volume but tends to become loud at times   Mood:  anxious and dysthymic   Affect:  mood-congruent   Thought Process:  goal directed and illogical slightly pressured but able to be redirected   Thought Content:  delusions  grandiose and somatic about not being able to breathe despite being on nasal oxygen and paranoid about people out to harm her and Atrovent inhaler tainteded with peanut oil  No current suicidal homicidal thoughts intent or plans reported  No phobias obsessions or compulsions reported   Perceptual Disturbances: None and does not appear responding to internal stimuli   Risk Potential: Tendency to not care for herself if she goes off treatment   Sensorium:  person, place, time/date, situation, day of week, month of year and time   Cognition:  grossly intact   Consciousness:  alert and awake    Attention: Intact concentration and attention span   Intellect: Considered to be at least of average intelligence   Insight:  limited in denial   Judgment: poor      Motor Activity: no abnormal movements         Recent Labs:  Results Reviewed     None          I/O Past 24 hours:  No intake/output data recorded  No intake/output data recorded  Assessment / Plan:     [facility]oaffective disorder[facility]      Reason for continued inpatient care:  Because of underlying paranoia and refusal to go back to the personal care home and inability to care for herself on her own  Acceptance by patient:  Accepting  [place] in recovery:  About living in another personal care home once she feels better  Understanding of medications :  Has some understanding  Involved in reintegration process:  Adjusting to the unit  Trusting in relatoinship with psychiatrist:  Trusting    Recommended Treatment:    Medication changes:  1)increase the  clozapine as per titration to reach therapeutic dosage as blood work has been acceptable       Non-pharmacological treatments  1) start individual therapy group therapy, milieu therapy and occupational therapy and milieu therapy involving multidisciplinary team approach with psychotherapist, case management, nursing, peer support services, art therapist etc using recovery principles and psycho-education about accepting illness and need for treatment   Safety  1) Safety/communication plan established targeting dynamic risk factors above  Discharge Plan most likely back to the a:  Personal care boarding home with act services once her delusions are managed and mood becomes more stabilized and she becomes more receptive to treatment compliance    Counseling / Coordination of Care    Total floor / unit time spent today 15 minutes  Greater than 50% of total time was spent with the patient and / or family counseling and / or coordination of care  A description of the counseling / coordination of care  Patient's Rights, confidentiality and exceptions to confidentiality, use of automated medical record, [address] [place] [address] staff access to medical record, and consent to treatment reviewed      Roberto Colorado MD

## 2019-08-05 NOTE — PLAN OF CARE
Problem: Alteration in Thoughts and Perception  Goal: Verbalize thoughts and feelings  Description  Interventions:  - Promote a nonjudgmental and trusting relationship with the patient through active listening and therapeutic communication  - Assess patient's level of functioning, behavior and potential for risk  - Engage patient in 1 on 1 interactions for a minimum of 15 minutes each session  - Encourage patient to express fears, feelings, frustrations, and discuss symptoms    - [place] patient to reality, help patient recognize reality-based thinking   - Administer medications as ordered and assess for potential side effects  - Provide the patient education related to the signs and symptoms of the illness and desired effects of prescribed medications  Outcome: Progressing  Goal: Agree to be compliant with medication regime, as prescribed and report medication side effects  Description  Interventions:  - Offer appropriate PRN medication and supervise ingestion; conduct aims, as needed   Outcome: Progressing     Problem: Alteration in Thoughts and Perception  Goal: Recognize dysfunctional thoughts, communicate reality-based thoughts at the time of discharge  Description  Interventions:  - Provide medication and psycho-education to assist patient in compliance and developing insight into his/her illness   Outcome: Not Progressing   Patient is pleasant and cooperative this morning  She is mostly Isolative to her room, laying in bed sleeping  Refused to get OOB for breakfast   She did attend treatment team but no other groups this shift  Continues to lack insight into her illness  Somatic regarding her lung disease and SOB  She complained of feeling SOB and having wheezes  This writer did assess her, upon lung auscultation her lungs were clear throughout, no wheezes noted  O2 saturation was 97%  Patient was educated on the importance of getting OOB for her mental and physical health    She verbalized understanding  She did get OOB for lunch, she was slightly upset when she saw that the lunch trays did not arrive to the unit  She was redirectable  Continue to monitor

## 2019-08-06 PROCEDURE — 99232 SBSQ HOSP IP/OBS MODERATE 35: CPT | Performed by: PSYCHIATRY & NEUROLOGY

## 2019-08-06 RX ADMIN — THEOPHYLLINE ANHYDROUS 200 MG: 200 CAPSULE, EXTENDED RELEASE ORAL at 08:45

## 2019-08-06 RX ADMIN — LEVOTHYROXINE SODIUM 125 MCG: 125 TABLET ORAL at 06:15

## 2019-08-06 RX ADMIN — FLUTICASONE FUROATE AND VILANTEROL TRIFENATATE 1 PUFF: 200; 25 POWDER RESPIRATORY (INHALATION) at 08:46

## 2019-08-06 RX ADMIN — FLUOXETINE 10 MG: 10 CAPSULE ORAL at 08:45

## 2019-08-06 RX ADMIN — CLOZAPINE 125 MG: 25 TABLET ORAL at 20:10

## 2019-08-06 RX ADMIN — PANTOPRAZOLE SODIUM 40 MG: 40 TABLET, DELAYED RELEASE ORAL at 06:15

## 2019-08-06 NOTE — PROGRESS NOTES
No changes from previous assessment  She ate 50% of her dinner  Remains med focused, asking over and over if her new inhaler is in the med room  This writer reassured her multiple times  Continue to monitor

## 2019-08-06 NOTE — PROGRESS NOTES
Progress Note - Efraín Sizer 1955, 60 y o  female MRN: [id number]    Unit/Bed#: [facility] 049-75 Encounter: [id number]    Primary Care Provider: Deny Garcia MD   Date and time admitted to hospital: 7/23/2019  5:30 PM        * Schizoaffective disorder, bipolar type [facility])  Assessment & Plan  Psychiatry Progress Note    Subjective: Interval History     Patient has agreed to start attending to groups a day but she has not done so yet despite agreeing to do so yesterday in team meeting  She is still focused on the need for oxygen with some somatic preoccupation about the same  She is still insisting that she cannot breathe or take showers on her own despite staff offering support and assistance even though pulse ox is acceptable most of the time and she can actually use the portable oxygen to get out of bed and go to groups if necessary  She still appears to have some paranoia about the staff and she is again refusing to take the Atrovent claiming that there is "peanut oil in it  She has been sleeping well and has been using the nasal oxygen at night  She is compliant with medications  No current suicidal homicidal thoughts intent or plans reported  She is casually dressed fairly groomed but is guarded suspicious evasive been in denial of her illness as usual   No overt hallucinations reported other than some underlying paranoid delusions and beliefs that there is a micro chip implanted on her left forearm pointing to the 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    Compliant with medications including the recent increase in clozapine so far with no signs or sxs of agranulocytosis or myocarditis or endocarditis and she is moving her bowels so far with no issues    Current medications:    Current Facility-Administered Medications:     acetaminophen (TYLENOL) tablet 650 mg, 650 mg, Oral, Q6H PRN, Jeet Levine MD    acetaminophen (TYLENOL) tablet 650 mg, 650 mg, Oral, Q6H PRN, Jeet Levine MD    acetaminophen (TYLENOL) tablet 650 mg, 650 mg, Oral, Q6H PRN, Teri Huggins MD    albuterol (PROVENTIL HFA,VENTOLIN HFA) inhaler 2 puff, 2 puff, Inhalation, Q4H PRN, Teri Huggins MD, 2 puff at 07/25/19 1200    benzonatate (TESSALON PERLES) capsule 100 mg, 100 mg, Oral, TID PRN, Teri Huggins MD    cloZAPine (CLOZARIL) tablet 125 mg, 125 mg, Oral, HS, Teri Huggins MD, 125 mg at 08/05/19 2139    FLUoxetine (PROzac) capsule 10 mg, 10 mg, Oral, Daily, Teri Huggins MD, 10 mg at 08/05/19 0855    fluticasone-vilanterol (BREO ELLIPTA) 200-25 MCG/INH inhaler 1 puff, 1 puff, Inhalation, Daily, Teri Huggins MD, 1 puff at 08/05/19 0855    levothyroxine tablet 125 mcg, 125 mcg, Oral, Early Morning, Teri Huggins MD, 125 mcg at 08/06/19 0615    OLANZapine (ZyPREXA) tablet 5 mg, 5 mg, Oral, Q8H PRN, Teri Huggins MD    pantoprazole (PROTONIX) EC tablet 40 mg, 40 mg, Oral, Early Morning, Teri Huggins MD, 40 mg at 08/06/19 0615    theophylline (JEF-24) 24 hr capsule 200 mg, 200 mg, Oral, Daily, Teri Huggins MD, 200 mg at 08/05/19 0855    traZODone (DESYREL) tablet 25 mg, 25 mg, Oral, HS PRN, Teri Huggins MD  Justification if on more than two antipsychotics:  Only on his clozapine  Side effects if any:  None    Risks , benefits, side effects and precautions of medications discussed with patient and reminded patient to let us know any problems with medications     Objective:     Vital Signs:  Vitals:    08/04/19 2130 08/05/19 0730 08/05/19 0732 08/05/19 2145   BP:  110/62 113/62    BP Location:  Left arm Left arm    Pulse:  83 103    Resp:  17     Temp:  98 1 °F (36 7 °C)     TempSrc:  Temporal     SpO2: 98%   93%   Weight:       Height:         Appearance:  age appropriate, casually dressed and overweight older than stated age   Behavior:  deviant, evasive and guarded and suspicious but with good eye contact   Speech:  normal pitch and normal volume but tends to become loud at times   Mood:  anxious and dysthymic   Affect:  mood-congruent Thought Process:  goal directed and illogical slightly pressured but able to be redirected   Thought Content:  delusions  grandiose and somatic about not being able to breathe despite being on nasal oxygen and paranoid about people out to harm her and Atrovent inhaler tainteded with peanut oil  No current suicidal homicidal thoughts intent or plans reported  No phobias obsessions or compulsions reported   Perceptual Disturbances: None and does not appear responding to internal stimuli   Risk Potential: Tendency to not care for herself if she goes off treatment   Sensorium:  person, place, time/date, situation, day of week, month of year and time   Cognition:  grossly intact   Consciousness:  alert and awake    Attention: Intact concentration and attention span   Intellect: Considered to be at least of average intelligence   Insight:  limited in denial   Judgment: poor      Motor Activity: no abnormal movements         Recent Labs:  Results Reviewed     None          I/O Past 24 hours:  No intake/output data recorded  No intake/output data recorded  Assessment / Plan:     [facility]ffective [facility]      Reason for continued inpatient care:  Because of underlying paranoia and refusal to go back to the personal care home and inability to care for herself on her own  Acceptance by patient:  Accepting  [place] in recovery:  About living in another personal care home once she feels better  Understanding of medications :  Has some understanding  Involved in reintegration process:  Adjusting to the unit  Trusting in relatoinship with psychiatrist:  Trusting    Recommended Treatment:    Medication changes:  1)increase the  clozapine as per titration to reach therapeutic dosage as blood work has been acceptable       Non-pharmacological treatments  1) start individual therapy group therapy, milieu therapy and occupational therapy and milieu therapy involving multidisciplinary team approach with psychotherapist, case management, nursing, peer support services, art therapist etc using recovery principles and psycho-education about accepting illness and need for treatment   Safety  1) Safety/communication plan established targeting dynamic risk factors above  Discharge Plan most likely back to the a:  Personal care boarding home with act services once her delusions are managed and mood becomes more stabilized and she becomes more receptive to treatment compliance    Counseling / Coordination of Care    Total floor / unit time spent today 15 minutes  Greater than 50% of total time was spent with the patient and / or family counseling and / or coordination of care  A description of the counseling / coordination of care  Patient's Rights, confidentiality and exceptions to confidentiality, use of automated medical record, [facility] to [facility] record, and consent to treatment reviewed      Jennifer Taveras MD

## 2019-08-06 NOTE — PLAN OF CARE
Problem: Alteration in Thoughts and Perception  Goal: Verbalize thoughts and feelings  Description  Interventions:  - Promote a nonjudgmental and trusting relationship with the patient through active listening and therapeutic communication  - Assess patient's level of functioning, behavior and potential for risk  - Engage patient in 1 on 1 interactions for a minimum of 15 minutes each session  - Encourage patient to express fears, feelings, frustrations, and discuss symptoms    - [place] patient to reality, help patient recognize reality-based thinking   - Administer medications as ordered and assess for potential side effects  - Provide the patient education related to the signs and symptoms of the illness and desired effects of prescribed medications  Outcome: Progressing  Goal: Agree to be compliant with medication regime, as prescribed and report medication side effects  Description  Interventions:  - Offer appropriate PRN medication and supervise ingestion; conduct aims, as needed   Outcome: Progressing     Problem: Alteration in Thoughts and Perception  Goal: Treatment Goal: Gain control of psychotic behaviors/thinking, reduce/eliminate presenting symptoms and demonstrate improved reality functioning upon discharge  Outcome: Not Progressing   Patient is pleasant and cooperative this morning  She is mostly Isolative to her room but does come out for medications and meals when prompted  She sleeps most of the day  She is medication focused, particularly on her inhaler  She keeps stating that there was nothing on the inhaler when she took it  However, there was one dose and a new inhaler was ordered  She was reassured of this multiple times this shift  Attended and participated in [address] group this shift  Continue to monitor

## 2019-08-06 NOTE — PLAN OF CARE
Problem: PAIN - ADULT  Goal: Verbalizes/displays adequate comfort level or baseline comfort level  Description  Interventions:  - Encourage patient to monitor pain and request assistance  - Assess pain using appropriate pain scale  - Administer analgesics based on type and severity of pain and evaluate response  - Implement non-pharmacological measures as appropriate and evaluate response  - Consider cultural and social influences on pain and pain management  - Notify physician/advanced practitioner if interventions unsuccessful or patient reports new pain  Outcome: Progressing     Problem: SAFETY ADULT  Goal: Patient will remain free of falls  Description  INTERVENTIONS:  - Assess patient frequently for physical needs  -  Identify cognitive and physical deficits and behaviors that affect risk of falls    -  [place] fall precautions as indicated by assessment   - Educate patient/family on patient safety including physical limitations  - Instruct patient to call for assistance with activity based on assessment  - Modify environment to reduce risk of injury  - Consider OT/PT consult to assist with strengthening/mobility  Outcome: Progressing     Problem: RESPIRATORY - ADULT  Goal: Achieves optimal ventilation and oxygenation  Description  INTERVENTIONS:  - Assess for changes in respiratory status  - Assess for changes in mentation and behavior  - Position to facilitate oxygenation and minimize respiratory effort  - Oxygen administration by appropriate delivery method based on oxygen saturation (per order) or ABGs  - Initiate smoking cessation education as indicated  - Encourage broncho-pulmonary hygiene including cough, deep breathe, Incentive Spirometry  - Assess the need for suctioning and aspirate as needed  - Assess and instruct to report SOB or any respiratory difficulty  - Respiratory Therapy support as indicated  Outcome: Nanda Cox maintained on ongoing fall and SAFE precaution without incident on this shift    Laying in bed with eyes closed, breath even and unlabored, with 02 @1l/m via nasal cannula for COPD   No respiratory distress noted  Jaya Elizabeth will continue to remain SAFE and free from fall while residing on EAC   Q15 minutes checks continues during rounds   No indication of pain or discomfort noted   Will continue to monitor sleep and behavioral pattern

## 2019-08-06 NOTE — PROGRESS NOTES
Pt attended [facility] and continues to focus on oxygen, inhaler and medications  This writer redirected her numerous times but was unsuccessful  Pt presented and seemed paranoid when suggesting that staff is keeping her sick and not allowing her to move ahead  Writer continued to prompt and have her focus on how and why she is here however she continued to complain about a particular nurse and her Dr Jamarcus Newsome allowed Pt to speak her concerns, then kindly ask her to listen to others speak about their personal recoveries which may help her in understanding her own struggles and barriers to treatment  Pt then gave others an opportunity to speak without her constantly interrupting and speaking over others

## 2019-08-06 NOTE — PROGRESS NOTES
08/06/19 0900   Team Meeting   Meeting Type Daily Rounds   Team Members Present   Team Members Present Physician;Nurse;; Other (Discipline and Name)   Physician Team Member Dr Leandra Vega, RN   Care Management Team Member Brenda Pleitez   Other (Discipline and Name) [facility]; SHANIKA Dubon     Patient not attending groups  Still preoccupied over her oxygen use  Slept

## 2019-08-06 NOTE — ASSESSMENT & PLAN NOTE
Psychiatry Progress Note    Subjective: Interval History     Patient has agreed to start attending to groups a day but she has not done so yet despite agreeing to do so yesterday in team meeting  She is still focused on the need for oxygen with some somatic preoccupation about the same  She is still insisting that she cannot breathe or take showers on her own despite staff offering support and assistance even though pulse ox is acceptable most of the time and she can actually use the portable oxygen to get out of bed and go to groups if necessary  She still appears to have some paranoia about the staff and she is again refusing to take the Atrovent claiming that there is "peanut oil in it  She has been sleeping well and has been using the nasal oxygen at night  She is compliant with medications  No current suicidal homicidal thoughts intent or plans reported  She is casually dressed fairly groomed but is guarded suspicious evasive been in denial of her illness as usual   No overt hallucinations reported other than some underlying paranoid delusions and beliefs that there is a micro chip implanted on her left forearm pointing to the 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    Compliant with medications including the recent increase in clozapine so far with no signs or sxs of agranulocytosis or myocarditis or endocarditis and she is moving her bowels so far with no issues    Current medications:    Current Facility-Administered Medications:     acetaminophen (TYLENOL) tablet 650 mg, 650 mg, Oral, Q6H PRN, Prakash Brewster MD    acetaminophen (TYLENOL) tablet 650 mg, 650 mg, Oral, Q6H PRN, Prakash Brewster MD    acetaminophen (TYLENOL) tablet 650 mg, 650 mg, Oral, Q6H PRN, Prakash Brewster MD    albuterol (PROVENTIL HFA,VENTOLIN HFA) inhaler 2 puff, 2 puff, Inhalation, Q4H PRN, Prakash Brewster MD, 2 puff at 07/25/19 1200    benzonatate (TESSALON PERLES) capsule 100 mg, 100 mg, Oral, TID PRN, Prakash Brewster MD    cloZAPine (CLOZARIL) tablet 125 mg, 125 mg, Oral, HS, Meldon Curling, MD, 125 mg at 08/05/19 2139    FLUoxetine (PROzac) capsule 10 mg, 10 mg, Oral, Daily, Meldon Curling, MD, 10 mg at 08/05/19 0855    fluticasone-vilanterol (BREO ELLIPTA) 200-25 MCG/INH inhaler 1 puff, 1 puff, Inhalation, Daily, Meldon Curling, MD, 1 puff at 08/05/19 0855    levothyroxine tablet 125 mcg, 125 mcg, Oral, Early Morning, Meldon Curling, MD, 125 mcg at 08/06/19 0615    OLANZapine (ZyPREXA) tablet 5 mg, 5 mg, Oral, Q8H PRN, Meldon Curling, MD    pantoprazole (PROTONIX) EC tablet 40 mg, 40 mg, Oral, Early Morning, Meldon Curling, MD, 40 mg at 08/06/19 0615    theophylline (JEF-24) 24 hr capsule 200 mg, 200 mg, Oral, Daily, Meldon Curling, MD, 200 mg at 08/05/19 0855    traZODone (DESYREL) tablet 25 mg, 25 mg, Oral, HS PRN, Meldon Curling, MD  Justification if on more than two antipsychotics:  Only on his clozapine  Side effects if any:  None    Risks , benefits, side effects and precautions of medications discussed with patient and reminded patient to let us know any problems with medications     Objective:     Vital Signs:  Vitals:    08/04/19 2130 08/05/19 0730 08/05/19 0732 08/05/19 2145   BP:  110/62 113/62    BP Location:  Left arm Left arm    Pulse:  83 103    Resp:  17     Temp:  98 1 °F (36 7 °C)     TempSrc:  Temporal     SpO2: 98%   93%   Weight:       Height:         Appearance:  age appropriate, casually dressed and overweight older than stated age   Behavior:  deviant, evasive and guarded and suspicious but with good eye contact   Speech:  normal pitch and normal volume but tends to become loud at times   Mood:  anxious and dysthymic   Affect:  mood-congruent   Thought Process:  goal directed and illogical slightly pressured but able to be redirected   Thought Content:  delusions  grandiose and somatic about not being able to breathe despite being on nasal oxygen and paranoid about people out to harm her and Atrovent inhaler tainteded with peanut oil  No current suicidal homicidal thoughts intent or plans reported  No phobias obsessions or compulsions reported   Perceptual Disturbances: None and does not appear responding to internal stimuli   Risk Potential: Tendency to not care for herself if she goes off treatment   Sensorium:  person, place, time/date, situation, day of week, month of year and time   Cognition:  grossly intact   Consciousness:  alert and awake    Attention: Intact concentration and attention span   Intellect: Considered to be at least of average intelligence   Insight:  limited in denial   Judgment: poor      Motor Activity: no abnormal movements         Recent Labs:  Results Reviewed     None          I/O Past 24 hours:  No intake/output data recorded  No intake/output data recorded  Assessment / Plan:     Schizoaffective disorder, bipolar [facility] ([facility] )      Reason for continued inpatient care:  Because of underlying paranoia and refusal to go back to the personal care home and inability to care for herself on her own  Acceptance by patient:  Accepting  Arian Fitzpatrick in recovery:  About living in another personal care home once she feels better  Understanding of medications :  Has some understanding  Involved in reintegration process:  Adjusting to the unit  Trusting in relatoinship with psychiatrist:  Trusting    Recommended Treatment:    Medication changes:  1)increase the  clozapine as per titration to reach therapeutic dosage as blood work has been acceptable  Non-pharmacological treatments  1) start individual therapy group therapy, milieu therapy and occupational therapy and milieu therapy involving multidisciplinary team approach with psychotherapist, case management, nursing, peer support services, art therapist etc using recovery principles and psycho-education about accepting illness and need for treatment   Safety  1) Safety/communication plan established targeting dynamic risk factors above    Discharge Plan most likely back to the a:  Personal care boarding home with act services once her delusions are managed and mood becomes more stabilized and she becomes more receptive to treatment compliance    Counseling / Coordination of Care    Total floor / unit time spent today 15 minutes  Greater than 50% of total time was spent with the patient and / or family counseling and / or coordination of care  A description of the counseling / coordination of care  Patient's Rights, confidentiality and exceptions to confidentiality, use of automated medical record, [facility] staff access to medical record, and consent to treatment reviewed      Greer Beltran MD

## 2019-08-06 NOTE — PROGRESS NOTES
2145 Maggie did not attend PM Group  She did come out for HS snack, came to window for her HS medicine  The nasal O2 @ 1L via cannula & compressor applied @ HS after PO2 of 93% on room air

## 2019-08-07 PROCEDURE — 99232 SBSQ HOSP IP/OBS MODERATE 35: CPT | Performed by: PSYCHIATRY & NEUROLOGY

## 2019-08-07 RX ADMIN — FLUOXETINE 10 MG: 10 CAPSULE ORAL at 08:29

## 2019-08-07 RX ADMIN — PANTOPRAZOLE SODIUM 40 MG: 40 TABLET, DELAYED RELEASE ORAL at 06:07

## 2019-08-07 RX ADMIN — LEVOTHYROXINE SODIUM 125 MCG: 125 TABLET ORAL at 06:07

## 2019-08-07 RX ADMIN — CLOZAPINE 150 MG: 25 TABLET ORAL at 21:37

## 2019-08-07 RX ADMIN — FLUTICASONE FUROATE AND VILANTEROL TRIFENATATE 1 PUFF: 200; 25 POWDER RESPIRATORY (INHALATION) at 08:30

## 2019-08-07 RX ADMIN — THEOPHYLLINE ANHYDROUS 200 MG: 200 CAPSULE, EXTENDED RELEASE ORAL at 08:29

## 2019-08-07 NOTE — PLAN OF CARE
Problem: PAIN - ADULT  Goal: Verbalizes/displays adequate comfort level or baseline comfort level  Description  Interventions:  - Encourage patient to monitor pain and request assistance  - Assess pain using appropriate pain scale  - Administer analgesics based on type and severity of pain and evaluate response  - Implement non-pharmacological measures as appropriate and evaluate response  - Consider cultural and social influences on pain and pain management  - Notify physician/advanced practitioner if interventions unsuccessful or patient reports new pain  Outcome: Progressing     Problem: SAFETY ADULT  Goal: Patient will remain free of falls  Description  INTERVENTIONS:  - Assess patient frequently for physical needs  -  Identify cognitive and physical deficits and behaviors that affect risk of falls    -  [place] fall precautions as indicated by assessment   - Educate patient/family on patient safety including physical limitations  - Instruct patient to call for assistance with activity based on assessment  - Modify environment to reduce risk of injury  - Consider OT/PT consult to assist with strengthening/mobility  Outcome: Progressing     Problem: RESPIRATORY - ADULT  Goal: Achieves optimal ventilation and oxygenation  Description  INTERVENTIONS:  - Assess for changes in respiratory status  - Assess for changes in mentation and behavior  - Position to facilitate oxygenation and minimize respiratory effort  - Oxygen administration by appropriate delivery method based on oxygen saturation (per order) or ABGs  - Initiate smoking cessation education as indicated  - Encourage broncho-pulmonary hygiene including cough, deep breathe, Incentive Spirometry  - Assess the need for suctioning and aspirate as needed  - Assess and instruct to report SOB or any respiratory difficulty  - Respiratory Therapy support as indicated  Outcome: Ronnie Harris maintained on ongoing fall and SAFE precaution without incident on this shift    Laying in bed with eyes closed, breath even and unlabored, with 02 @1l/m via nasal cannula for COPD   No respiratory distress noted  [facility] will continue to remain SAFE and free from fall while residing on EAC   Q15 minutes checks continues during rounds   No indication of pain or discomfort noted   Will continue to monitor sleep and behavioral pattern

## 2019-08-07 NOTE — PROGRESS NOTES
Progress Note - Madison Fast 1955, 60 y o  female MRN: [id number]    Unit/Bed#: [facility] 157-79 Encounter: [id number]    Primary Care Provider: Alison Saenz MD   Date and time admitted to hospital: 7/23/2019  5:30 PM        * Schizoaffective disorder, bipolar type [facility])  Assessment & Plan  Psychiatry Progress Note    Subjective: Interval History     Patient has agreed to start attending to two groups a day but she only attended 1 IMR group yesterday and states that she will stick to attending 2 groups a day as much as possible  She is still focused on the need for oxygen with some somatic preoccupation about the same and continues to insist that she cannot breathe or take showers on her own despite staff offering support and assistance even though pulse ox is acceptable most of the time  She does not wish to use the portable oxygen to get out of bed and go to groups if necessary  She claims that she is breathing find today since she got the inhaler as yesterday she was accusing 1 particular nurse of giving her an empty inhaler with no medicine in it  She still appears to have some paranoia about the staff and she is again refusing to take the Atrovent claiming that there is "peanut oil in it  She has been sleeping well and has been using the nasal oxygen at night  She is compliant with medications so far with no side effects except for some mild drooling from the clozapine  No current suicidal homicidal thoughts intent or plans reported  She is casually dressed fairly groomed but is guarded suspicious evasive been in denial of her illness as usual   No overt hallucinations reported other than some underlying paranoid delusions and beliefs that there is a micro chip implanted on her left forearm pointing to the 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 which also appears to be fixed delusion    Compliant with medications including the recent increase in clozapine so far with no signs or sxs of agranulocytosis or myocarditis or endocarditis and she is moving her bowels so far with no issues    Her hygiene is fair and she was again reminded to start attending more groups and she tells me that she is getting along well with her roommate    Current medications:    Current Facility-Administered Medications:     acetaminophen (TYLENOL) tablet 650 mg, 650 mg, Oral, Q6H PRN, Vincent Olivares MD    acetaminophen (TYLENOL) tablet 650 mg, 650 mg, Oral, Q6H PRN, Vincent Olivares MD    acetaminophen (TYLENOL) tablet 650 mg, 650 mg, Oral, Q6H PRN, Vincent Olivares MD    albuterol (PROVENTIL HFA,VENTOLIN HFA) inhaler 2 puff, 2 puff, Inhalation, Q4H PRN, Vincent Olivares MD, 2 puff at 07/25/19 1200    benzonatate (TESSALON PERLES) capsule 100 mg, 100 mg, Oral, TID PRN, Vincent Olivares MD    cloZAPine (CLOZARIL) tablet 125 mg, 125 mg, Oral, HS, Vincent Olivares MD, 125 mg at 08/06/19 2010    FLUoxetine (PROzac) capsule 10 mg, 10 mg, Oral, Daily, Vincent Olivares MD, 10 mg at 08/07/19 0829    fluticasone-vilanterol (BREO ELLIPTA) 200-25 MCG/INH inhaler 1 puff, 1 puff, Inhalation, Daily, Vincent Olivares MD, 1 puff at 08/07/19 0830    levothyroxine tablet 125 mcg, 125 mcg, Oral, Early Morning, Vincent Olivares MD, 125 mcg at 08/07/19 0607    OLANZapine (ZyPREXA) tablet 5 mg, 5 mg, Oral, Q8H PRN, Vincent Olivares MD    pantoprazole (PROTONIX) EC tablet 40 mg, 40 mg, Oral, Early Morning, Vincent Olivares MD, 40 mg at 08/07/19 0607    theophylline (JEF-24) 24 hr capsule 200 mg, 200 mg, Oral, Daily, Vincent Olivares MD, 200 mg at 08/07/19 5868    traZODone (DESYREL) tablet 25 mg, 25 mg, Oral, HS PRN, Vincent Olivares MD  Justification if on more than two antipsychotics:  Only on his clozapine  Side effects if any:  None    Risks , benefits, side effects and precautions of medications discussed with patient and reminded patient to let us know any problems with medications     Objective:     Vital Signs:  Vitals:    08/05/19 2145 08/06/19 0730 08/07/19 0700 08/07/19 0705   BP: 122/78 115/71 112/74   BP Location:  Right arm Left arm Left arm   Pulse:  89 91 101   Resp:  18 18 18   Temp:  97 9 °F (36 6 °C) (!) 97 4 °F (36 3 °C)    TempSrc:   Temporal    SpO2: 93%  92%    Weight:       Height:         Appearance:  age appropriate, casually dressed and overweight older than stated age   Behavior:  deviant, evasive and guarded and suspicious but with good eye contact   Speech:  normal pitch and normal volume but tends to become loud at times   Mood:  anxious and dysthymic   Affect:  mood-congruent   Thought Process:  goal directed and illogical slightly pressured but able to be redirected   Thought Content:  delusions  grandiose and somatic about not being able to breathe despite being on nasal oxygen and paranoid about people out to harm her and Atrovent inhaler tainteded with peanut oil  No current suicidal homicidal thoughts intent or plans reported  No phobias obsessions or compulsions reported   Perceptual Disturbances: None and does not appear responding to internal stimuli   Risk Potential: Tendency to not care for herself if she goes off treatment   Sensorium:  person, place, time/date, situation, day of week, month of year and time   Cognition:  grossly intact   Consciousness:  alert and awake    Attention: Intact concentration and attention span   Intellect: Considered to be at least of average intelligence   Insight:  limited in denial   Judgment: poor      Motor Activity: no abnormal movements         Recent Labs:  Results Reviewed     None          I/O Past 24 hours:  No intake/output data recorded  No intake/output data recorded          Assessment / Plan:     Schizoaffective disorder, bipolar type ([facility] )      Reason for continued inpatient care:  Because of underlying paranoia and refusal to go back to the personal care home and inability to care for herself on her own  Acceptance by patient:  Accepting  Hopefulness in recovery:  About living in another personal care home once she feels better  Understanding of medications :  Has some understanding  Involved in reintegration process:  Adjusting to the unit  Trusting in relatoinship with psychiatrist:  Trusting    Recommended Treatment:    Medication changes:  1)increase the  clozapine again as per titration to reach therapeutic dosage as blood work has been acceptable  Non-pharmacological treatments  1) start individual therapy group therapy, milieu therapy and occupational therapy and milieu therapy involving multidisciplinary team approach with psychotherapist, case management, nursing, peer support services, art therapist etc using recovery principles and psycho-education about accepting illness and need for treatment   Safety  1) Safety/communication plan established targeting dynamic risk factors above  Discharge Plan most likely back to the a:  Personal care boarding home with act services once her delusions are managed and mood becomes more stabilized and she becomes more receptive to treatment compliance    Counseling / Coordination of Care    Total floor / unit time spent today 15 minutes  Greater than 50% of total time was spent with the patient and / or family counseling and / or coordination of care  A description of the counseling / coordination of care  Patient's Rights, confidentiality and exceptions to confidentiality, use of automated medical record, [address] [place] [address] staff access to medical record, and consent to treatment reviewed      Jeet Levine MD

## 2019-08-07 NOTE — PLAN OF CARE
Problem: Alteration in Thoughts and Perception  Goal: Agree to be compliant with medication regime, as prescribed and report medication side effects  Description  Interventions:  - Offer appropriate PRN medication and supervise ingestion; conduct aims, as needed   Outcome: Progressing  Goal: Attend and participate in unit activities, including therapeutic, recreational, and educational groups  Description  Interventions:  - Provide therapeutic and educational activities daily, encourage attendance and participation, and document same in the medical record   Outcome: Progressing  Goal: Recognize dysfunctional thoughts, communicate reality-based thoughts at the time of discharge  Description  Interventions:  - Provide medication and psycho-education to assist patient in compliance and developing insight into his/her illness   Outcome: Progressing     Problem: Depression  Goal: Verbalize thoughts and feelings  Description  Interventions:  - Assess and re-assess patient's level of risk   - Engage patient in 1:1 interactions, daily, for a minimum of 15 minutes   - Encourage patient to express feelings, fears, frustrations, hopes   Outcome: Progressing  Goal: Refrain from isolation  Description  Interventions:  - Develop a trusting relationship   - Encourage socialization   Outcome: Progressing  Goal: Refrain from self-neglect  Outcome: Progressing     Problem: Alteration in Orientation  Goal: Interact with staff daily  Description  Interventions:  - Assess and re-assess patient's level of orientation  - Engage patient in 1 on 1 interactions, daily, for a minimum of 15 minutes   - Establish rapport/trust with patient   Outcome: Progressing     Problem: SAFETY ADULT  Goal: Patient will remain free of falls  Description  INTERVENTIONS:  - Assess patient frequently for physical needs  -  Identify cognitive and physical deficits and behaviors that affect risk of falls    -  [place] fall precautions as indicated by assessment   - Educate patient/family on patient safety including physical limitations  - Instruct patient to call for assistance with activity based on assessment  - Modify environment to reduce risk of injury  - Consider OT/PT consult to assist with strengthening/mobility  Outcome: Progressing       Meals: 75% of breakfast and lunch  Attended IMR group, remains on Fall Precautions  Compliant with routine medications and meals, irritable with staff and peers but cooperative with redirection, more visible today, clozaril increased today 150mg q hs

## 2019-08-07 NOTE — ASSESSMENT & PLAN NOTE
Psychiatry Progress Note    Subjective: Interval History     Patient has agreed to start attending to two groups a day but she only attended 1 IMR group yesterday and states that she will stick to attending 2 groups a day as much as possible  She is still focused on the need for oxygen with some somatic preoccupation about the same and continues to insist that she cannot breathe or take showers on her own despite staff offering support and assistance even though pulse ox is acceptable most of the time  She does not wish to use the portable oxygen to get out of bed and go to groups if necessary  She claims that she is breathing find today since she got the inhaler as yesterday she was accusing 1 particular nurse of giving her an empty inhaler with no medicine in it  She still appears to have some paranoia about the staff and she is again refusing to take the Atrovent claiming that there is "peanut oil in it  She has been sleeping well and has been using the nasal oxygen at night  She is compliant with medications so far with no side effects except for some mild drooling from the clozapine  No current suicidal homicidal thoughts intent or plans reported  She is casually dressed fairly groomed but is guarded suspicious evasive been in denial of her illness as usual   No overt hallucinations reported other than some underlying paranoid delusions and beliefs that there is a micro chip implanted on her left forearm pointing to the 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 which also appears to be fixed delusion  Compliant with medications including the recent increase in clozapine so far with no signs or sxs of agranulocytosis or myocarditis or endocarditis and she is moving her bowels so far with no issues    Her hygiene is fair and she was again reminded to start attending more groups and she tells me that she is getting along well with her roommate    Current medications:    Current Facility-Administered Medications:    acetaminophen (TYLENOL) tablet 650 mg, 650 mg, Oral, Q6H PRN, Christian Bennett MD    acetaminophen (TYLENOL) tablet 650 mg, 650 mg, Oral, Q6H PRN, Christian Bennett MD    acetaminophen (TYLENOL) tablet 650 mg, 650 mg, Oral, Q6H PRN, Christian Bennett MD    albuterol (PROVENTIL HFA,VENTOLIN HFA) inhaler 2 puff, 2 puff, Inhalation, Q4H PRN, Christian Bennett MD, 2 puff at 07/25/19 1200    benzonatate (TESSALON PERLES) capsule 100 mg, 100 mg, Oral, TID PRN, Christian Bennett MD    cloZAPine (CLOZARIL) tablet 125 mg, 125 mg, Oral, HS, Christian Bennett MD, 125 mg at 08/06/19 2010    FLUoxetine (PROzac) capsule 10 mg, 10 mg, Oral, Daily, Christian Bennett MD, 10 mg at 08/07/19 0829    fluticasone-vilanterol (BREO ELLIPTA) 200-25 MCG/INH inhaler 1 puff, 1 puff, Inhalation, Daily, Christian Bennett MD, 1 puff at 08/07/19 0830    levothyroxine tablet 125 mcg, 125 mcg, Oral, Early Morning, Christian Bennett MD, 125 mcg at 08/07/19 0607    OLANZapine (ZyPREXA) tablet 5 mg, 5 mg, Oral, Q8H PRN, Christian Bennett MD    pantoprazole (PROTONIX) EC tablet 40 mg, 40 mg, Oral, Early Morning, Christian Bennett MD, 40 mg at 08/07/19 0607    theophylline (JEF-24) 24 hr capsule 200 mg, 200 mg, Oral, Daily, Christian Bennett MD, 200 mg at 08/07/19 2617    traZODone (DESYREL) tablet 25 mg, 25 mg, Oral, HS PRN, Christian Bennett MD  Justification if on more than two antipsychotics:  Only on his clozapine  Side effects if any:  None    Risks , benefits, side effects and precautions of medications discussed with patient and reminded patient to let us know any problems with medications     Objective:     Vital Signs:  Vitals:    08/05/19 2145 08/06/19 0730 08/07/19 0700 08/07/19 0705   BP:  122/78 115/71 112/74   BP Location:  Right arm Left arm Left arm   Pulse:  89 91 101   Resp:  18 18 18   Temp:  97 9 °F (36 6 °C) (!) 97 4 °F (36 3 °C)    TempSrc:   Temporal    SpO2: 93%  92%    Weight:       Height:         Appearance:  age appropriate, casually dressed and overweight older than stated age   Behavior:  deviant, evasive and guarded and suspicious but with good eye contact   Speech:  normal pitch and normal volume but tends to become loud at times   Mood:  anxious and dysthymic   Affect:  mood-congruent   Thought Process:  goal directed and illogical slightly pressured but able to be redirected   Thought Content:  delusions  grandiose and somatic about not being able to breathe despite being on nasal oxygen and paranoid about people out to harm her and Atrovent inhaler tainteded with peanut oil  No current suicidal homicidal thoughts intent or plans reported  No phobias obsessions or compulsions reported   Perceptual Disturbances: None and does not appear responding to internal stimuli   Risk Potential: Tendency to not care for herself if she goes off treatment   Sensorium:  person, place, time/date, situation, day of week, month of year and time   Cognition:  grossly intact   Consciousness:  alert and awake    Attention: Intact concentration and attention span   Intellect: Considered to be at least of average intelligence   Insight:  limited in denial   Judgment: poor      Motor Activity: no abnormal movements         Recent Labs:  Results Reviewed     None          I/O Past 24 hours:  No intake/output data recorded  No intake/output data recorded          Assessment / Plan:     [facility]      Reason for continued inpatient care:  Because of underlying paranoia and refusal to go back to the personal care home and inability to care for herself on her own  Acceptance by patient:  Accepting  Jordy Lee in recovery:  About living in another personal care home once she feels better  Understanding of medications :  Has some understanding  Involved in reintegration process:  Adjusting to the unit  Trusting in relatoinship with psychiatrist:  Trusting    Recommended Treatment:    Medication changes:  1)increase the  clozapine again as per titration to reach therapeutic dosage as blood work has been acceptable  Non-pharmacological treatments  1) start individual therapy group therapy, milieu therapy and occupational therapy and milieu therapy involving multidisciplinary team approach with psychotherapist, case management, nursing, peer support services, art therapist etc using recovery principles and psycho-education about accepting illness and need for treatment   Safety  1) Safety/communication plan established targeting dynamic risk factors above  Discharge Plan most likely back to the a:  Personal care boarding home with act services once her delusions are managed and mood becomes more stabilized and she becomes more receptive to treatment compliance    Counseling / Coordination of Care    Total floor / unit time spent today 15 minutes  Greater than 50% of total time was spent with the patient and / or family counseling and / or coordination of care  A description of the counseling / coordination of care  Patient's Rights, confidentiality and exceptions to confidentiality, use of automated medical record, [facility] staff access to medical record, and consent to treatment reviewed      Ivonne Nevarez MD

## 2019-08-07 NOTE — PROGRESS NOTES
08/07/19 0800   Team Meeting   Meeting Type Daily Rounds   Team Members Present   Team Members Present Physician;Nurse;; Other (Discipline and Name)   Physician Team Member Dr Joni Tavarez Team Member Laure Avilez RN   Care Management Team Member Brenda Donis   Other (Discipline and Name) DIANA Zuleta     Patient still somatic and preoccupied with oxygen usage  Attended IMR only  No other issues noted

## 2019-08-08 PROCEDURE — 99232 SBSQ HOSP IP/OBS MODERATE 35: CPT | Performed by: PSYCHIATRY & NEUROLOGY

## 2019-08-08 RX ADMIN — PANTOPRAZOLE SODIUM 40 MG: 40 TABLET, DELAYED RELEASE ORAL at 05:31

## 2019-08-08 RX ADMIN — CLOZAPINE 150 MG: 25 TABLET ORAL at 21:34

## 2019-08-08 RX ADMIN — FLUOXETINE 10 MG: 10 CAPSULE ORAL at 08:51

## 2019-08-08 RX ADMIN — LEVOTHYROXINE SODIUM 125 MCG: 125 TABLET ORAL at 05:31

## 2019-08-08 RX ADMIN — FLUTICASONE FUROATE AND VILANTEROL TRIFENATATE 1 PUFF: 200; 25 POWDER RESPIRATORY (INHALATION) at 08:51

## 2019-08-08 RX ADMIN — THEOPHYLLINE ANHYDROUS 200 MG: 200 CAPSULE, EXTENDED RELEASE ORAL at 08:51

## 2019-08-08 NOTE — PROGRESS NOTES
08/08/19 0800   Team Meeting   Meeting Type Daily Rounds   Team Members Present   Team Members Present Physician;Nurse;; Other (Discipline and Name)   Physician Team Member Dr Surinder Robbins Team Member Ruddy Montoya RN   Care Management Team Member Brenda Castaneda   Other (Discipline and Name) [place], [place]; SHANIKA Gonzáles        08/08/19 0800   Team Meeting   Meeting Type Daily Rounds   Team Members Present   Team Members Present Physician;Nurse;; Other (Discipline and Name)   Physician Team Member Dr Surinder Robbins Team Member Ruddy Montoya RN   Care Management Team Member Brenda Castaneda   Other (Discipline and Name) [place], [place]; SHANIKA Gonzáles     Patient attended [facility] yesterday  Irritable with staff  Worrying about "appropriate placement" for discharge  Slept

## 2019-08-08 NOTE — PLAN OF CARE
Problem: Alteration in Thoughts and Perception  Goal: Agree to be compliant with medication regime, as prescribed and report medication side effects  Description  Interventions:  - Offer appropriate PRN medication and supervise ingestion; conduct aims, as needed   Outcome: Progressing     Problem: RESPIRATORY - ADULT  Goal: Achieves optimal ventilation and oxygenation  Description  INTERVENTIONS:  - Assess for changes in respiratory status  - Assess for changes in mentation and behavior  - Position to facilitate oxygenation and minimize respiratory effort  - Oxygen administration by appropriate delivery method based on oxygen saturation (per order) or ABGs  - Initiate smoking cessation education as indicated  - Encourage broncho-pulmonary hygiene including cough, deep breathe, Incentive Spirometry  - Assess the need for suctioning and aspirate as needed  - Assess and instruct to report SOB or any respiratory difficulty  - Respiratory Therapy support as indicated  Outcome: Progressing     Problem: Alteration in Thoughts and Perception  Goal: Attend and participate in unit activities, including therapeutic, recreational, and educational groups  Description  Interventions:  - Provide therapeutic and educational activities daily, encourage attendance and participation, and document same in the medical record   Outcome: Not Progressing  Goal: Complete daily ADLs, including personal hygiene independently, as able  Description  Interventions:  - Observe, teach, and assist patient with ADLS  - Monitor and promote a balance of rest/activity, with adequate nutrition and elimination   Outcome: Not Progressing     Problem: Depression  Goal: Refrain from isolation  Description  Interventions:  - Develop a trusting relationship   - Encourage socialization   Outcome: Not Progressing     [address] has been isolative to her room & bed, coming out only for meal (ate 75%), for HS snack, for scheduled HS medicine   Did not attend PM Group  Is verbal about her history of hospitalizations  Says was living in a rather luxurious assisted living setting, but, opted for hospitalization for depression & anxiety, as "didn't like the drama of 81 [de-identified] 80year olds"  Then went to the [facility], "a little rough"  Not sure what lies ahead as concerned many settings might find her too medically complex to handle  Agrees to talk w/the  about options  Reassured her many settings exist, can be accommodated  She is pleasant, but, socially withdrawn due to her isolation  Is wearing her 1L nasal O2 @ HS

## 2019-08-08 NOTE — PROGRESS NOTES
Progress Note - Chun Carrillo 1955, 60 y o  female MRN: [id number]    [facility] Encounter: [id number]    Primary Care Provider: Stephania Garcia MD   Date and time admitted to hospital: 7/23/2019  5:30 PM        * Schizoaffective disorder, bipolar type [facility])  Assessment & Plan  Psychiatry Progress Note    Subjective: Interval History     Patient is still focused on the need for oxygen with some somatic preoccupation about the same and continues to insist that she cannot breathe or take showers on her own despite staff offering support and assistance even though pulse ox is acceptable most of the time  She only attended 1 group yesterday and is planning to start attending to groups a day as agreed upon in team meeting  She is able to converse with her roommate in a socially appropriate manner  She does not wish to use the portable oxygen to get out of bed and go to groups if necessary  She claims that she is still having difficulty with breathing even though she is found to be breathing fine  She still appears to have some paranoia about the staff and she is again refusing to take the Atrovent claiming that there is "peanut oil in it  She has been sleeping well and has been using the nasal oxygen at night  She is compliant with medications so far with no side effects except for some mild drooling from the clozapine which is expected but does not want to take any medications to counter that when offered     No current suicidal homicidal thoughts intent or plans reported  She is casually dressed fairly groomed but is guarded suspicious evasive been in denial of her illness as usual   No overt hallucinations reported other than some underlying paranoid delusions and beliefs that there is a micro chip implanted on her left forearm pointing to the 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 which also appears to be fixed delusion    Compliant with medications including the recent increase in clozapine so far with no signs or sxs of agranulocytosis or myocarditis or endocarditis and she is moving her bowels so far with no issues      Current medications:    Current Facility-Administered Medications:     acetaminophen (TYLENOL) tablet 650 mg, 650 mg, Oral, Q6H PRN, Jennifer Taveras MD    acetaminophen (TYLENOL) tablet 650 mg, 650 mg, Oral, Q6H PRN, Jennifer Taveras MD    acetaminophen (TYLENOL) tablet 650 mg, 650 mg, Oral, Q6H PRN, Jennifer Taveras MD    albuterol (PROVENTIL HFA,VENTOLIN HFA) inhaler 2 puff, 2 puff, Inhalation, Q4H PRN, Jennifer Taveras MD, 2 puff at 07/25/19 1200    benzonatate (TESSALON PERLES) capsule 100 mg, 100 mg, Oral, TID PRN, Jennifer Taveras MD    cloZAPine (CLOZARIL) tablet 150 mg, 150 mg, Oral, HS, Jennifer Taveras MD, 150 mg at 08/07/19 2137    FLUoxetine (PROzac) capsule 10 mg, 10 mg, Oral, Daily, Jennifer Taveras MD, 10 mg at 08/08/19 0851    fluticasone-vilanterol (BREO ELLIPTA) 200-25 MCG/INH inhaler 1 puff, 1 puff, Inhalation, Daily, Jennifer Taveras MD, 1 puff at 08/08/19 0851    levothyroxine tablet 125 mcg, 125 mcg, Oral, Early Morning, Jennifer Taveras MD, 125 mcg at 08/08/19 0531    OLANZapine (ZyPREXA) tablet 5 mg, 5 mg, Oral, Q8H PRN, Jennifer Taveras MD    pantoprazole (PROTONIX) EC tablet 40 mg, 40 mg, Oral, Early Morning, Jennifer Taveras MD, 40 mg at 08/08/19 0531    theophylline (JEF-24) 24 hr capsule 200 mg, 200 mg, Oral, Daily, Jennifer Taveras MD, 200 mg at 08/08/19 0851    traZODone (DESYREL) tablet 25 mg, 25 mg, Oral, HS PRN, Jennifer Taveras MD  Justification if on more than two antipsychotics:  Only on his clozapine  Side effects if any:  None    Risks , benefits, side effects and precautions of medications discussed with patient and reminded patient to let us know any problems with medications     Objective:     Vital Signs:  Vitals:    08/07/19 0705 08/07/19 2145 08/08/19 0730 08/08/19 0732   BP: 112/74  122/73 123/79   BP Location: Left arm  Left arm Left arm   Pulse: 101  93 97   Resp: 18  17 Temp:   98 9 °F (37 2 °C)    TempSrc:   Temporal    SpO2:  94%     Weight:       Height:         Appearance:  age appropriate, casually dressed and overweight older than stated age   Behavior:  deviant, evasive and guarded and suspicious but with good eye contact   Speech:  normal pitch and normal volume but tends to become loud at times   Mood:  anxious and dysthymic   Affect:  mood-congruent   Thought Process:  goal directed and illogical slightly pressured but able to be redirected   Thought Content:  delusions  grandiose and somatic about not being able to breathe despite being on nasal oxygen and paranoid about people out to harm her and Atrovent inhaler tainteded with peanut oil  No current suicidal homicidal thoughts intent or plans reported  No phobias obsessions or compulsions reported   Perceptual Disturbances: None and does not appear responding to internal stimuli   Risk Potential: Tendency to not care for herself if she goes off treatment   Sensorium:  person, place, time/date, situation, day of week, month of year and time   Cognition:  grossly intact   Consciousness:  alert and awake    Attention: Intact concentration and attention span   Intellect: Considered to be at least of average intelligence   Insight:  limited in denial   Judgment: poor      Motor Activity: no abnormal movements         Recent Labs:  Results Reviewed     None          I/O Past 24 hours:  No intake/output data recorded  No intake/output data recorded          Assessment / Plan:     [facility]ive [facility]      Reason for continued inpatient care:  Because of underlying paranoia and refusal to go back to the personal care home and inability to care for herself on her own  Acceptance by patient:  Accepting  Patsy Benavidez in recovery:  About living in another personal care home once she feels better  Understanding of medications :  Has some understanding  Involved in reintegration process:  Adjusting to the unit  Trusting in relatoinship with psychiatrist:  Trusting    Recommended Treatment:    Medication changes:  1) none today  Non-pharmacological treatments  1) start individual therapy group therapy, milieu therapy and occupational therapy and milieu therapy involving multidisciplinary team approach with psychotherapist, case management, nursing, peer support services, art therapist etc using recovery principles and psycho-education about accepting illness and need for treatment   Safety  1) Safety/communication plan established targeting dynamic risk factors above  Discharge Plan most likely back to the a:  Personal care boarding home with act services once her delusions are managed and mood becomes more stabilized and she becomes more receptive to treatment compliance    Counseling / Coordination of Care    Total floor / unit time spent today 15 minutes  Greater than 50% of total time was spent with the patient and / or family counseling and / or coordination of care  A description of the counseling / coordination of care  Patient's Rights, confidentiality and exceptions to confidentiality, use of automated medical record, [facility] staff access to [facility] record, and consent to treatment reviewed      Felisa Florence MD

## 2019-08-08 NOTE — ASSESSMENT & PLAN NOTE
Psychiatry Progress Note    Subjective: Interval History     Patient is still focused on the need for oxygen with some somatic preoccupation about the same and continues to insist that she cannot breathe or take showers on her own despite staff offering support and assistance even though pulse ox is acceptable most of the time  She only attended 1 group yesterday and is planning to start attending to groups a day as agreed upon in team meeting  She is able to converse with her roommate in a socially appropriate manner  She does not wish to use the portable oxygen to get out of bed and go to groups if necessary  She claims that she is still having difficulty with breathing even though she is found to be breathing fine  She still appears to have some paranoia about the staff and she is again refusing to take the Atrovent claiming that there is "peanut oil in it  She has been sleeping well and has been using the nasal oxygen at night  She is compliant with medications so far with no side effects except for some mild drooling from the clozapine which is expected but does not want to take any medications to counter that when offered     No current suicidal homicidal thoughts intent or plans reported  She is casually dressed fairly groomed but is guarded suspicious evasive been in denial of her illness as usual   No overt hallucinations reported other than some underlying paranoid delusions and beliefs that there is a micro chip implanted on her left forearm pointing to the 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 which also appears to be fixed delusion  Compliant with medications including the recent increase in clozapine so far with no signs or sxs of agranulocytosis or myocarditis or endocarditis and she is moving her bowels so far with no issues      Current medications:    Current Facility-Administered Medications:     acetaminophen (TYLENOL) tablet 650 mg, 650 mg, Oral, Q6H PRN, Alirio Frazier MD    acetaminophen (TYLENOL) tablet 650 mg, 650 mg, Oral, Q6H PRN, Dalton Garner MD    acetaminophen (TYLENOL) tablet 650 mg, 650 mg, Oral, Q6H PRN, Dalton Garner MD    albuterol (PROVENTIL HFA,VENTOLIN HFA) inhaler 2 puff, 2 puff, Inhalation, Q4H PRN, Dalton Garner MD, 2 puff at 07/25/19 1200    benzonatate (TESSALON PERLES) capsule 100 mg, 100 mg, Oral, TID PRN, Dalton Garner MD    cloZAPine (CLOZARIL) tablet 150 mg, 150 mg, Oral, HS, Dalton Garner MD, 150 mg at 08/07/19 2137    FLUoxetine (PROzac) capsule 10 mg, 10 mg, Oral, Daily, Dalton Garner MD, 10 mg at 08/08/19 0851    fluticasone-vilanterol (BREO ELLIPTA) 200-25 MCG/INH inhaler 1 puff, 1 puff, Inhalation, Daily, Dalton Garner MD, 1 puff at 08/08/19 0851    levothyroxine tablet 125 mcg, 125 mcg, Oral, Early Morning, Dalton Garner MD, 125 mcg at 08/08/19 0531    OLANZapine (ZyPREXA) tablet 5 mg, 5 mg, Oral, Q8H PRN, Dalton Garner MD    pantoprazole (PROTONIX) EC tablet 40 mg, 40 mg, Oral, Early Morning, Dalton Garner MD, 40 mg at 08/08/19 0531    theophylline (JEF-24) 24 hr capsule 200 mg, 200 mg, Oral, Daily, Dalton Garner MD, 200 mg at 08/08/19 0851    traZODone (DESYREL) tablet 25 mg, 25 mg, Oral, HS PRN, Dalton Garner MD  Justification if on more than two antipsychotics:  Only on his clozapine  Side effects if any:  None    Risks , benefits, side effects and precautions of medications discussed with patient and reminded patient to let us know any problems with medications     Objective:     Vital Signs:  Vitals:    08/07/19 0705 08/07/19 2145 08/08/19 0730 08/08/19 0732   BP: 112/74  122/73 123/79   BP Location: Left arm  Left arm Left arm   Pulse: 101  93 97   Resp: 18  17    Temp:   98 9 °F (37 2 °C)    TempSrc:   Temporal    SpO2:  94%     Weight:       Height:         Appearance:  age appropriate, casually dressed and overweight older than stated age   Behavior:  deviant, evasive and guarded and suspicious but with good eye contact   Speech:  normal pitch and normal volume but tends to become loud at times   Mood:  anxious and dysthymic   Affect:  mood-congruent   Thought Process:  goal directed and illogical slightly pressured but able to be redirected   Thought Content:  delusions  grandiose and somatic about not being able to breathe despite being on nasal oxygen and paranoid about people out to harm her and Atrovent inhaler tainteded with peanut oil  No current suicidal homicidal thoughts intent or plans reported  No phobias obsessions or compulsions reported   Perceptual Disturbances: None and does not appear responding to internal stimuli   Risk Potential: Tendency to not care for herself if she goes off treatment   Sensorium:  person, place, time/date, situation, day of week, month of year and time   Cognition:  grossly intact   Consciousness:  alert and awake    Attention: Intact concentration and attention span   Intellect: Considered to be at least of average intelligence   Insight:  limited in denial   Judgment: poor      Motor Activity: no abnormal movements         Recent Labs:  Results Reviewed     None          I/O Past 24 hours:  No intake/output data recorded  No intake/output data recorded          Assessment / Plan:     Schizoaffective disorder, [facility] ([facility] )      Reason for continued inpatient care:  Because of underlying paranoia and refusal to go back to the personal care home and inability to care for herself on her own  Acceptance by patient:  Accepting  Clarisa Mcgregor in recovery:  About living in another personal care home once she feels better  Understanding of medications :  Has some understanding  Involved in reintegration process:  Adjusting to the unit  Trusting in relatoinship with psychiatrist:  Trusting    Recommended Treatment:    Medication changes:  1) none today  Non-pharmacological treatments  1) start individual therapy group therapy, milieu therapy and occupational therapy and milieu therapy involving multidisciplinary team approach with psychotherapist, case management, nursing, peer support services, art therapist etc using recovery principles and psycho-education about accepting illness and need for treatment   Safety  1) Safety/communication plan established targeting dynamic risk factors above  Discharge Plan most likely back to the a:  Personal care boarding home with act services once her delusions are managed and mood becomes more stabilized and she becomes more receptive to treatment compliance    Counseling / Coordination of Care    Total floor / unit time spent today 15 minutes  Greater than 50% of total time was spent with the patient and / or family counseling and / or coordination of care  A description of the counseling / coordination of care  Patient's Rights, confidentiality and exceptions to confidentiality, use of automated medical record, [address] [place] [address] staff access to medical record, and consent to treatment reviewed      Oval MD Joseluis

## 2019-08-08 NOTE — PLAN OF CARE
Problem: Alteration in Thoughts and Perception  Goal: Verbalize thoughts and feelings  Description  Interventions:  - Promote a nonjudgmental and trusting relationship with the patient through active listening and therapeutic communication  - Assess patient's level of functioning, behavior and potential for risk  - Engage patient in 1 on 1 interactions for a minimum of 15 minutes each session  - Encourage patient to express fears, feelings, frustrations, and discuss symptoms    - [place] patient to reality, help patient recognize reality-based thinking   - Administer medications as ordered and assess for potential side effects  - Provide the patient education related to the signs and symptoms of the illness and desired effects of prescribed medications  Outcome: Progressing  Goal: Agree to be compliant with medication regime, as prescribed and report medication side effects  Description  Interventions:  - Offer appropriate PRN medication and supervise ingestion; conduct aims, as needed   Outcome: Progressing     Problem: Depression  Goal: Refrain from isolation  Description  Interventions:  - Develop a trusting relationship   - Encourage socialization   Outcome: Progressing  Goal: Refrain from self-neglect  Outcome: Progressing     Problem: Anxiety  Goal: Anxiety is at manageable level  Description  Interventions:  - Assess and monitor patient's anxiety level  - Monitor for signs and symptoms of anxiety both physical and emotional (heart palpitations, chest pain, shortness of breath, headaches, nausea, feeling jumpy, restlessness, irritable, apprehensive)  - Collaborate with interdisciplinary team and initiate plan and interventions as ordered    - [place] patient to unit/surroundings  - Explain treatment plan  - Encourage participation in care  - Encourage verbalization of concerns/fears  - Identify coping mechanisms  - Assist in developing anxiety-reducing skills  - Administer/offer alternative therapies  - Limit or eliminate stimulants  Outcome: Progressing     Problem: Alteration in Orientation  Goal: Interact with staff daily  Description  Interventions:  - Assess and re-assess patient's level of orientation  - Engage patient in 1 on 1 interactions, daily, for a minimum of 15 minutes   - Establish rapport/trust with patient   Outcome: Progressing     Problem: PAIN - ADULT  Goal: Verbalizes/displays adequate comfort level or baseline comfort level  Description  Interventions:  - Encourage patient to monitor pain and request assistance  - Assess pain using appropriate pain scale  - Administer analgesics based on type and severity of pain and evaluate response  - Implement non-pharmacological measures as appropriate and evaluate response  - Consider cultural and social influences on pain and pain management  - Notify physician/advanced practitioner if interventions unsuccessful or patient reports new pain  Outcome: Progressing     Problem: SAFETY ADULT  Goal: Patient will remain free of falls  Description  INTERVENTIONS:  - Assess patient frequently for physical needs  -  Identify cognitive and physical deficits and behaviors that affect risk of falls    -  [place] fall precautions as indicated by assessment   - Educate patient/family on patient safety including physical limitations  - Instruct patient to call for assistance with activity based on assessment  - Modify environment to reduce risk of injury  - Consider OT/PT consult to assist with strengthening/mobility  Outcome: Progressing     Problem: RESPIRATORY - ADULT  Goal: Achieves optimal ventilation and oxygenation  Description  INTERVENTIONS:  - Assess for changes in respiratory status  - Assess for changes in mentation and behavior  - Position to facilitate oxygenation and minimize respiratory effort  - Oxygen administration by appropriate delivery method based on oxygen saturation (per order) or ABGs  - Initiate smoking cessation education as indicated  - Encourage broncho-pulmonary hygiene including cough, deep breathe, Incentive Spirometry  - Assess the need for suctioning and aspirate as needed  - Assess and instruct to report SOB or any respiratory difficulty  - Respiratory Therapy support as indicated  Outcome: Progressing      Compliant with routine medications and meals, politely refused offer to attend group activities, napping most of the day out of bed for medications and meals did not require prompting, stated the she "just feels a little tired today" possibly adjusting to the Clozaril increase

## 2019-08-08 NOTE — PLAN OF CARE
Problem: PAIN - ADULT  Goal: Verbalizes/displays adequate comfort level or baseline comfort level  Description  Interventions:  - Encourage patient to monitor pain and request assistance  - Assess pain using appropriate pain scale  - Administer analgesics based on type and severity of pain and evaluate response  - Implement non-pharmacological measures as appropriate and evaluate response  - Consider cultural and social influences on pain and pain management  - Notify physician/advanced practitioner if interventions unsuccessful or patient reports new pain  Outcome: Progressing     Problem: SAFETY ADULT  Goal: Patient will remain free of falls  Description  INTERVENTIONS:  - Assess patient frequently for physical needs  -  Identify cognitive and physical deficits and behaviors that affect risk of falls    -  [place] fall precautions as indicated by assessment   - Educate patient/family on patient safety including physical limitations  - Instruct patient to call for assistance with activity based on assessment  - Modify environment to reduce risk of injury  - Consider OT/PT consult to assist with strengthening/mobility  Outcome: Progressing     Problem: RESPIRATORY - ADULT  Goal: Achieves optimal ventilation and oxygenation  Description  INTERVENTIONS:  - Assess for changes in respiratory status  - Assess for changes in mentation and behavior  - Position to facilitate oxygenation and minimize respiratory effort  - Oxygen administration by appropriate delivery method based on oxygen saturation (per order) or ABGs  - Initiate smoking cessation education as indicated  - Encourage broncho-pulmonary hygiene including cough, deep breathe, Incentive Spirometry  - Assess the need for suctioning and aspirate as needed  - Assess and instruct to report SOB or any respiratory difficulty  - Respiratory Therapy support as indicated  Outcome: Quinn Sencoleen maintained on ongoing fall and SAFE precaution without incident on this shift    Laying in bed with eyes closed, breath even and unlabored, with 02 @1l/m via nasal cannula for COPD   No respiratory distress noted  [place] will continue to remain SAFE and free from fall while residing on EAC   Q15 minutes checks continues during rounds   No indication of pain or discomfort noted   Will continue to monitor sleep and behavioral pattern

## 2019-08-09 LAB
BASOPHILS # BLD AUTO: 0.1 THOUSANDS/ΜL (ref 0–0.1)
BASOPHILS NFR BLD AUTO: 1 % (ref 0–1)
EOSINOPHIL # BLD AUTO: 0.1 THOUSAND/ΜL (ref 0–0.4)
EOSINOPHIL NFR BLD AUTO: 2 % (ref 0–6)
ERYTHROCYTE [DISTWIDTH] IN BLOOD BY AUTOMATED COUNT: 15.8 %
HCT VFR BLD AUTO: 44.3 % (ref 36–46)
HGB BLD-MCNC: 14.6 G/DL (ref 12–16)
LYMPHOCYTES # BLD AUTO: 3 THOUSANDS/ΜL (ref 0.5–4)
LYMPHOCYTES NFR BLD AUTO: 32 % (ref 25–45)
MCH RBC QN AUTO: 30.1 PG (ref 26–34)
MCHC RBC AUTO-ENTMCNC: 33 G/DL (ref 31–36)
MCV RBC AUTO: 91 FL (ref 80–100)
MONOCYTES # BLD AUTO: 0.7 THOUSAND/ΜL (ref 0.2–0.9)
MONOCYTES NFR BLD AUTO: 8 % (ref 1–10)
NEUTROPHILS # BLD AUTO: 5.3 THOUSANDS/ΜL (ref 1.8–7.8)
NEUTS SEG NFR BLD AUTO: 57 % (ref 45–65)
PLATELET # BLD AUTO: 247 THOUSANDS/UL (ref 150–450)
PMV BLD AUTO: 9.1 FL (ref 8.9–12.7)
RBC # BLD AUTO: 4.85 MILLION/UL (ref 4–5.2)
WBC # BLD AUTO: 9.2 THOUSAND/UL (ref 4.5–11)

## 2019-08-09 PROCEDURE — 80159 DRUG ASSAY CLOZAPINE: CPT | Performed by: PSYCHIATRY & NEUROLOGY

## 2019-08-09 PROCEDURE — 99232 SBSQ HOSP IP/OBS MODERATE 35: CPT | Performed by: PSYCHIATRY & NEUROLOGY

## 2019-08-09 PROCEDURE — 85025 COMPLETE CBC W/AUTO DIFF WBC: CPT | Performed by: PSYCHIATRY & NEUROLOGY

## 2019-08-09 RX ORDER — CLOZAPINE 25 MG/1
50 TABLET ORAL
Status: DISCONTINUED | OUTPATIENT
Start: 2019-08-09 | End: 2019-08-09

## 2019-08-09 RX ORDER — CLOZAPINE 100 MG/1
100 TABLET ORAL
Status: DISCONTINUED | OUTPATIENT
Start: 2019-08-09 | End: 2019-08-12

## 2019-08-09 RX ORDER — CLOZAPINE 25 MG/1
50 TABLET ORAL
Status: DISCONTINUED | OUTPATIENT
Start: 2019-08-09 | End: 2019-08-12

## 2019-08-09 RX ADMIN — CLOZAPINE 50 MG: 25 TABLET ORAL at 17:50

## 2019-08-09 RX ADMIN — PANTOPRAZOLE SODIUM 40 MG: 40 TABLET, DELAYED RELEASE ORAL at 05:57

## 2019-08-09 RX ADMIN — CLOZAPINE 100 MG: 100 TABLET ORAL at 21:45

## 2019-08-09 RX ADMIN — LEVOTHYROXINE SODIUM 125 MCG: 125 TABLET ORAL at 05:57

## 2019-08-09 RX ADMIN — FLUTICASONE FUROATE AND VILANTEROL TRIFENATATE 1 PUFF: 200; 25 POWDER RESPIRATORY (INHALATION) at 08:44

## 2019-08-09 RX ADMIN — THEOPHYLLINE ANHYDROUS 200 MG: 200 CAPSULE, EXTENDED RELEASE ORAL at 08:44

## 2019-08-09 RX ADMIN — FLUOXETINE 10 MG: 10 CAPSULE ORAL at 08:44

## 2019-08-09 NOTE — PLAN OF CARE
Problem: Alteration in Thoughts and Perception  Goal: Agree to be compliant with medication regime, as prescribed and report medication side effects  Description  Interventions:  - Offer appropriate PRN medication and supervise ingestion; conduct aims, as needed   Outcome: Progressing     Problem: Alteration in Thoughts and Perception  Goal: Attend and participate in unit activities, including therapeutic, recreational, and educational groups  Description  Interventions:  - Provide therapeutic and educational activities daily, encourage attendance and participation, and document same in the medical record   Outcome: Not Progressing  Goal: Complete daily ADLs, including personal hygiene independently, as able  Description  Interventions:  - Observe, teach, and assist patient with ADLS  - Monitor and promote a balance of rest/activity, with adequate nutrition and elimination   Outcome: Not Progressing     Problem: Ineffective Coping  Goal: Demonstrates healthy coping skills  Outcome: Not Progressing     [address] Al[address] is pleasant, but, continues to isolate in bed w/exception of coming out for meal (ate 25% as didn't like it), for HS snack, for HS medicine  Has concerns the Clozaril is too sedating, is tired waking hours, has drooling ("But, I don't want another pill for that!")  Was encouraged to keep doctor apprised of her response, reassured her that adjustments can be made if necessary (I e  Split doses)  Did not attend Women's Group or PM Group  Did not attend to any hygiene  Nasal O2 @ 1L applied HS after obtaining PO2 of 94% on room air

## 2019-08-09 NOTE — ASSESSMENT & PLAN NOTE
Psychiatry Progress Note    Subjective: Interval History     Patient is not attending too many groups and is basically staying on her bed but is conversing with her roommate  She  still focused on the need for oxygen with some somatic preoccupation about the same and continues to insist that she cannot breathe or take showers on her own despite staff offering support and assistance even though pulse ox is acceptable most of the time  She has chosen not to to use the portable oxygen to get out of bed and go to groups if necessary  She complains of feeling tired and some drooling but refuses to add any medications to take care of it when offered  She still appears to harbor some paranoia  She is still points to the lipoma on her and claims that it is a micro chip implanted on  She still appears to have some paranoia about the staff accusing them of not give giving her the right inhalant and she is again refusing to take the Atrovent claiming that there is "peanut oil in it  She has been sleeping well and has been using the nasal oxygen at night  She is compliant with medications so far but is questioning whether she needs the clozapine at this time  No current suicidal homicidal thoughts intent or plans reported  She is casually dressed fairly groomed but is guarded suspicious evasive been in denial of her illness as usual   No overt hallucinations or well systematized delusions reported other than some underlying paranoid delusions and beliefs that there is a micro chip implanted on her left forearm   No signs or sxs of agranulocytosis or myocarditis or endocarditis and she is moving her bowels so far with no issues    Focus has been to encouraged her to accept her illness as and to cooperate with titrating the clozapine dose  Current medications:    Current Facility-Administered Medications:     acetaminophen (TYLENOL) tablet 650 mg, 650 mg, Oral, Q6H PRN, Greer Beltran MD    acetaminophen (TYLENOL) tablet 650 mg, 650 mg, Oral, Q6H PRN, Roberto Colorado MD    acetaminophen (TYLENOL) tablet 650 mg, 650 mg, Oral, Q6H PRN, Roberto Colorado MD    albuterol (PROVENTIL HFA,VENTOLIN HFA) inhaler 2 puff, 2 puff, Inhalation, Q4H PRN, Roberto Colorado MD, 2 puff at 07/25/19 1200    benzonatate (TESSALON PERLES) capsule 100 mg, 100 mg, Oral, TID PRN, Roberto Colorado MD    cloZAPine (CLOZARIL) tablet 100 mg, 100 mg, Oral, HS, Roberto Colorado MD    cloZAPine (CLOZARIL) tablet 50 mg, 50 mg, Oral, HS, Roberto Colorado MD    FLUoxetine (PROzac) capsule 10 mg, 10 mg, Oral, Daily, Roberto Colorado MD, 10 mg at 08/09/19 0844    fluticasone-vilanterol (BREO ELLIPTA) 200-25 MCG/INH inhaler 1 puff, 1 puff, Inhalation, Daily, Roberto Colorado MD, 1 puff at 08/09/19 0844    levothyroxine tablet 125 mcg, 125 mcg, Oral, Early Morning, Roberto Colorado MD, 125 mcg at 08/09/19 0557    OLANZapine (ZyPREXA) tablet 5 mg, 5 mg, Oral, Q8H PRN, Roberto Colorado MD    pantoprazole (PROTONIX) EC tablet 40 mg, 40 mg, Oral, Early Morning, Roberto Colorado MD, 40 mg at 08/09/19 0557    theophylline (JEF-24) 24 hr capsule 200 mg, 200 mg, Oral, Daily, Roberto Colorado MD, 200 mg at 08/09/19 0844    traZODone (DESYREL) tablet 25 mg, 25 mg, Oral, HS PRN, Roberto Colorado MD  Justification if on more than two antipsychotics:  Only on his clozapine  Side effects if any:  None    Risks , benefits, side effects and precautions of medications discussed with patient and reminded patient to let us know any problems with medications     Objective:     Vital Signs:  Vitals:    08/08/19 0730 08/08/19 0732 08/08/19 2140 08/09/19 0730   BP: 122/73 123/79  133/78   BP Location: Left arm Left arm  Left arm   Pulse: 93 97  90   Resp: 17   17   Temp: 98 9 °F (37 2 °C)   (!) 97 2 °F (36 2 °C)   TempSrc: Temporal   Temporal   SpO2:   94%    Weight:       Height:         Appearance:  age appropriate, casually dressed and overweight older than stated age   Behavior:  deviant, evasive and guarded and suspicious but with good eye contact   Speech:  normal pitch and normal volume but tends to become loud at times   Mood:  anxious and dysthymic   Affect:  mood-congruent   Thought Process:  goal directed and illogical slightly pressured but able to be redirected   Thought Content:  delusions  grandiose and somatic about not being able to breathe despite being on nasal oxygen and paranoid about people out to harm her and Atrovent inhaler tainteded with peanut oil  No current suicidal homicidal thoughts intent or plans reported  No phobias obsessions or compulsions reported   Perceptual Disturbances: None and does not appear responding to internal stimuli   Risk Potential: Tendency to not care for herself if she goes off treatment   Sensorium:  person, place, time/date, situation, day of week, month of year and time   Cognition:  grossly intact   Consciousness:  alert and awake    Attention: Intact concentration and attention span   Intellect: Considered to be at least of average intelligence   Insight:  limited in denial   Judgment: poor      Motor Activity: no abnormal movements         Recent Labs:  Results Reviewed     None          I/O Past 24 hours:  No intake/output data recorded  No intake/output data recorded  Assessment / Plan:     Schizoaffective disorder, [facility] ([facility] )      Reason for continued inpatient care:  Because of underlying paranoia and refusal to go back to the personal care home and inability to care for herself on her own  Acceptance by patient:  Accepting  Regan Keita in recovery:  About living in another personal care home once she feels better  Understanding of medications :  Has some understanding  Involved in reintegration process:  Adjusting to the unit  Trusting in relatoinship with psychiatrist:  Trusting    Recommended Treatment:    Medication changes:  1) switch clozapine as 50 mg at 4:00 p m   And 100 mg at bedtime to see if that would decrease her tiredness in the morning  Non-pharmacological treatments  1) start individual therapy group therapy, milieu therapy and occupational therapy and milieu therapy involving multidisciplinary team approach with psychotherapist, case management, nursing, peer support services, art therapist etc using recovery principles and psycho-education about accepting illness and need for treatment   Safety  1) Safety/communication plan established targeting dynamic risk factors above  Discharge Plan most likely back to the a:  Personal care boarding home with act services once her delusions are managed and mood becomes more stabilized and she becomes more receptive to treatment compliance    Counseling / Coordination of Care    Total floor / unit time spent today 15 minutes  Greater than 50% of total time was spent with the patient and / or family counseling and / or coordination of care  A description of the counseling / coordination of care  Patient's Rights, confidentiality and exceptions to confidentiality, use of automated medical record, [address] [place] [address] staff access to medical record, and consent to treatment reviewed      Shaggy Rangel MD

## 2019-08-09 NOTE — PROGRESS NOTES
08/09/19 0900   Team Meeting   Meeting Type Daily Rounds   Team Members Present   Team Members Present Physician;Nurse;; Other (Discipline and Name)   Physician Team Member Dr Brisa Arana Team Member Stan Pinzon, RN   Care Management Team Member Brenda Baron   Other (Discipline and Name) [place], [place]; Cleven Jeans, CPS     No groups yesterday  CBC today  Still somatic  Slept

## 2019-08-09 NOTE — ASSESSMENT & PLAN NOTE
Psychiatry Progress Note    Subjective: Interval History     Patient is not attending too many groups and is basically staying on her bed but is conversing with her roommate  She  still focused on the need for oxygen with some somatic preoccupation about the same and continues to insist that she cannot breathe or take showers on her own despite staff offering support and assistance even though pulse ox is acceptable most of the time  She has chosen not to to use the portable oxygen to get out of bed and go to groups if necessary  She complains of feeling tired and some drooling but refuses to add any medications to take care of it when offered  She still appears to harbor some paranoia  She is still points to the lipoma on her and claims that it is a micro chip implanted on  She still appears to have some paranoia about the staff accusing them of not give giving her the right inhalant and she is again refusing to take the Atrovent claiming that there is "peanut oil in it  She has been sleeping well and has been using the nasal oxygen at night  She is compliant with medications so far but is questioning whether she needs the clozapine at this time  No current suicidal homicidal thoughts intent or plans reported  She is casually dressed fairly groomed but is guarded suspicious evasive been in denial of her illness as usual   No overt hallucinations or well systematized delusions reported other than some underlying paranoid delusions and beliefs that there is a micro chip implanted on her left forearm   No signs or sxs of agranulocytosis or myocarditis or endocarditis and she is moving her bowels so far with no issues    Focus has been to encouraged her to accept her illness as and to cooperate with titrating the clozapine dose  Current medications:    Current Facility-Administered Medications:     acetaminophen (TYLENOL) tablet 650 mg, 650 mg, Oral, Q6H PRN, Faheem Daniels MD    acetaminophen (TYLENOL) tablet 650 mg, 650 mg, Oral, Q6H PRN, Vincent Olivares MD    acetaminophen (TYLENOL) tablet 650 mg, 650 mg, Oral, Q6H PRN, Vincent Olivares MD    albuterol (PROVENTIL HFA,VENTOLIN HFA) inhaler 2 puff, 2 puff, Inhalation, Q4H PRN, Vincent Olivares MD, 2 puff at 07/25/19 1200    benzonatate (TESSALON PERLES) capsule 100 mg, 100 mg, Oral, TID PRN, Vincent Olivares MD    cloZAPine (CLOZARIL) tablet 150 mg, 150 mg, Oral, HS, Vincent Olivares MD, 150 mg at 08/08/19 2134    FLUoxetine (PROzac) capsule 10 mg, 10 mg, Oral, Daily, Vincent Olivares MD, 10 mg at 08/08/19 0851    fluticasone-vilanterol (BREO ELLIPTA) 200-25 MCG/INH inhaler 1 puff, 1 puff, Inhalation, Daily, Vincent Olivares MD, 1 puff at 08/08/19 0851    levothyroxine tablet 125 mcg, 125 mcg, Oral, Early Morning, Vincent Olivares MD, 125 mcg at 08/09/19 0557    OLANZapine (ZyPREXA) tablet 5 mg, 5 mg, Oral, Q8H PRN, Vincent Olivares MD    pantoprazole (PROTONIX) EC tablet 40 mg, 40 mg, Oral, Early Morning, Vincent Olivares MD, 40 mg at 08/09/19 0557    theophylline (JEF-24) 24 hr capsule 200 mg, 200 mg, Oral, Daily, Vincent Olivares MD, 200 mg at 08/08/19 0851    traZODone (DESYREL) tablet 25 mg, 25 mg, Oral, HS PRN, Vincent Olivares MD  Justification if on more than two antipsychotics:  Only on his clozapine  Side effects if any:  None    Risks , benefits, side effects and precautions of medications discussed with patient and reminded patient to let us know any problems with medications     Objective:     Vital Signs:  Vitals:    08/07/19 2145 08/08/19 0730 08/08/19 0732 08/08/19 2140   BP:  122/73 123/79    BP Location:  Left arm Left arm    Pulse:  93 97    Resp:  17     Temp:  98 9 °F (37 2 °C)     TempSrc:  Temporal     SpO2: 94%   94%   Weight:       Height:         Appearance:  age appropriate, casually dressed and overweight older than stated age   Behavior:  deviant, evasive and guarded and suspicious but with good eye contact   Speech:  normal pitch and normal volume but tends to become loud at times   Mood:  anxious and dysthymic   Affect:  mood-congruent   Thought Process:  goal directed and illogical slightly pressured but able to be redirected   Thought Content:  delusions  grandiose and somatic about not being able to breathe despite being on nasal oxygen and paranoid about people out to harm her and Atrovent inhaler tainteded with peanut oil  No current suicidal homicidal thoughts intent or plans reported  No phobias obsessions or compulsions reported   Perceptual Disturbances: None and does not appear responding to internal stimuli   Risk Potential: Tendency to not care for herself if she goes off treatment   Sensorium:  person, place, time/date, situation, day of week, month of year and time   Cognition:  grossly intact   Consciousness:  alert and awake    Attention: Intact concentration and attention span   Intellect: Considered to be at least of average intelligence   Insight:  limited in denial   Judgment: poor      Motor Activity: no abnormal movements         Recent Labs:  Results Reviewed     None          I/O Past 24 hours:  No intake/output data recorded  No intake/output data recorded          Assessment / Plan:     Schizoaffective disorder, bipolar type ([facility] )      Reason for continued inpatient care:  Because of underlying paranoia and refusal to go back to the personal care home and inability to care for herself on her own  Acceptance by patient:  Accepting  Ros Patel in recovery:  About living in another personal care home once she feels better  Understanding of medications :  Has some understanding  Involved in reintegration process:  Adjusting to the unit  Trusting in relatoinship with psychiatrist:  Trusting    Recommended Treatment:    Medication changes:  1) none today  Non-pharmacological treatments  1) start individual therapy group therapy, milieu therapy and occupational therapy and milieu therapy involving multidisciplinary team approach with psychotherapist, case management, nursing, peer support services, art therapist etc using recovery principles and psycho-education about accepting illness and need for treatment   Safety  1) Safety/communication plan established targeting dynamic risk factors above  Discharge Plan most likely back to the a:  Personal care boarding home with act services once her delusions are managed and mood becomes more stabilized and she becomes more receptive to treatment compliance    Counseling / Coordination of Care    Total floor / unit time spent today 15 minutes  Greater than 50% of total time was spent with the patient and / or family counseling and / or coordination of care  A description of the counseling / coordination of care  Patient's Rights, confidentiality and exceptions to confidentiality, use of automated medical record, [facility] staff access to medical record, and consent to treatment reviewed      Jaz Beasley MD

## 2019-08-09 NOTE — PROGRESS NOTES
Progress Note - Faustino Monsivais 1955, 60 y o  female MRN: [id number]    [facility] Encounter: [id number]    Primary Care Provider: Bradley Ledezma MD   Date and time admitted to hospital: 7/23/2019  5:30 PM        * Schizoaffective disorder, bipolar type [facility])  Assessment & Plan  Psychiatry Progress Note    Subjective: Interval History     Patient is not attending too many groups and is basically staying on her bed but is conversing with her roommate  She  still focused on the need for oxygen with some somatic preoccupation about the same and continues to insist that she cannot breathe or take showers on her own despite staff offering support and assistance even though pulse ox is acceptable most of the time  She has chosen not to to use the portable oxygen to get out of bed and go to groups if necessary  She complains of feeling tired and some drooling but refuses to add any medications to take care of it when offered  She still appears to harbor some paranoia  She is still points to the lipoma on her and claims that it is a micro chip implanted on  She still appears to have some paranoia about the staff accusing them of not give giving her the right inhalant and she is again refusing to take the Atrovent claiming that there is "peanut oil in it  She has been sleeping well and has been using the nasal oxygen at night  She is compliant with medications so far but is questioning whether she needs the clozapine at this time  No current suicidal homicidal thoughts intent or plans reported  She is casually dressed fairly groomed but is guarded suspicious evasive been in denial of her illness as usual   No overt hallucinations or well systematized delusions reported other than some underlying paranoid delusions and beliefs that there is a micro chip implanted on her left forearm     No signs or sxs of agranulocytosis or myocarditis or endocarditis and she is moving her bowels so far with no issues    Focus has been to encouraged her to accept her illness as and to cooperate with titrating the clozapine dose  Current medications:    Current Facility-Administered Medications:     acetaminophen (TYLENOL) tablet 650 mg, 650 mg, Oral, Q6H PRN, Meredith Wesley MD    acetaminophen (TYLENOL) tablet 650 mg, 650 mg, Oral, Q6H PRN, Meredith Wesley MD    acetaminophen (TYLENOL) tablet 650 mg, 650 mg, Oral, Q6H PRN, Meredith Wesley MD    albuterol (PROVENTIL HFA,VENTOLIN HFA) inhaler 2 puff, 2 puff, Inhalation, Q4H PRN, Meredith Wesley MD, 2 puff at 07/25/19 1200    benzonatate (TESSALON PERLES) capsule 100 mg, 100 mg, Oral, TID PRN, Meredith Wesley MD    cloZAPine (CLOZARIL) tablet 100 mg, 100 mg, Oral, HS, Meredith Wesley MD    cloZAPine (CLOZARIL) tablet 50 mg, 50 mg, Oral, HS, Meredith Wesley MD    FLUoxetine (PROzac) capsule 10 mg, 10 mg, Oral, Daily, Meredith Wesley MD, 10 mg at 08/09/19 0844    fluticasone-vilanterol (BREO ELLIPTA) 200-25 MCG/INH inhaler 1 puff, 1 puff, Inhalation, Daily, Meredith Wesley MD, 1 puff at 08/09/19 0844    levothyroxine tablet 125 mcg, 125 mcg, Oral, Early Morning, Meredith Wesley MD, 125 mcg at 08/09/19 0557    OLANZapine (ZyPREXA) tablet 5 mg, 5 mg, Oral, Q8H PRN, Meredith Wesley MD    pantoprazole (PROTONIX) EC tablet 40 mg, 40 mg, Oral, Early Morning, Meredith Wesley MD, 40 mg at 08/09/19 0557    theophylline (JEF-24) 24 hr capsule 200 mg, 200 mg, Oral, Daily, Meredith Wesley MD, 200 mg at 08/09/19 0844    traZODone (DESYREL) tablet 25 mg, 25 mg, Oral, HS PRN, Meredith Wesley MD  Justification if on more than two antipsychotics:  Only on his clozapine  Side effects if any:  None    Risks , benefits, side effects and precautions of medications discussed with patient and reminded patient to let us know any problems with medications     Objective:     Vital Signs:  Vitals:    08/08/19 0730 08/08/19 0732 08/08/19 2140 08/09/19 0730   BP: 122/73 123/79  133/78   BP Location: Left arm Left arm  Left arm Pulse: 93 97  90   Resp: 17   17   Temp: 98 9 °F (37 2 °C)   (!) 97 2 °F (36 2 °C)   TempSrc: Temporal   Temporal   SpO2:   94%    Weight:       Height:         Appearance:  age appropriate, casually dressed and overweight older than stated age   Behavior:  deviant, evasive and guarded and suspicious but with good eye contact   Speech:  normal pitch and normal volume but tends to become loud at times   Mood:  anxious and dysthymic   Affect:  mood-congruent   Thought Process:  goal directed and illogical slightly pressured but able to be redirected   Thought Content:  delusions  grandiose and somatic about not being able to breathe despite being on nasal oxygen and paranoid about people out to harm her and Atrovent inhaler tainteded with peanut oil  No current suicidal homicidal thoughts intent or plans reported  No phobias obsessions or compulsions reported   Perceptual Disturbances: None and does not appear responding to internal stimuli   Risk Potential: Tendency to not care for herself if she goes off treatment   Sensorium:  person, place, time/date, situation, day of week, month of year and time   Cognition:  grossly intact   Consciousness:  alert and awake    Attention: Intact concentration and attention span   Intellect: Considered to be at least of average intelligence   Insight:  limited in denial   Judgment: poor      Motor Activity: no abnormal movements         Recent Labs:  Results Reviewed     None          I/O Past 24 hours:  No intake/output data recorded  No intake/output data recorded          Assessment / Plan:     Schizoaffective disorder, bipolar type ([facility] )      Reason for continued inpatient care:  Because of underlying paranoia and refusal to go back to the personal care home and inability to care for herself on her own  Acceptance by patient:  Accepting  Ala Human in recovery:  About living in another personal care home once she feels better  Understanding of medications :  Has some understanding  Involved in reintegration process:  Adjusting to the unit  Trusting in relatoinship with psychiatrist:  Trusting    Recommended Treatment:    Medication changes:  1) switch clozapine as 50 mg at 4:00 p m  And 100 mg at bedtime to see if that would decrease her tiredness in the morning  Non-pharmacological treatments  1) start individual therapy group therapy, milieu therapy and occupational therapy and milieu therapy involving multidisciplinary team approach with psychotherapist, case management, nursing, peer support services, art therapist etc using recovery principles and psycho-education about accepting illness and need for treatment   Safety  1) Safety/communication plan established targeting dynamic risk factors above  Discharge Plan most likely back to the a:  Personal care boarding home with act services once her delusions are managed and mood becomes more stabilized and she becomes more receptive to treatment compliance    Counseling / Coordination of Care    Total floor / unit time spent today 15 minutes  Greater than 50% of total time was spent with the patient and / or family counseling and / or coordination of care  A description of the counseling / coordination of care  Patient's Rights, confidentiality and exceptions to confidentiality, use of automated medical record, [address] [place] [address] staff access to medical record, and consent to treatment reviewed      Meldon Curling, MD

## 2019-08-09 NOTE — PLAN OF CARE
Problem: Alteration in Thoughts and Perception  Goal: Agree to be compliant with medication regime, as prescribed and report medication side effects  Description  Interventions:  - Offer appropriate PRN medication and supervise ingestion; conduct aims, as needed   Outcome: Progressing     Problem: Alteration in Thoughts and Perception  Goal: Attend and participate in unit activities, including therapeutic, recreational, and educational groups  Description  Interventions:  - Provide therapeutic and educational activities daily, encourage attendance and participation, and document same in the medical record   Outcome: Not Progressing  Goal: Complete daily ADLs, including personal hygiene independently, as able  Description  Interventions:  - Observe, teach, and assist patient with ADLS  - Monitor and promote a balance of rest/activity, with adequate nutrition and elimination   Outcome: Not Progressing     Problem: Ineffective Coping  Goal: Demonstrates healthy coping skills  Outcome: Not Progressing     [address] Ernst Moreno continues to isolate in room in bed  She is disheveled, hair beginning to mat, but, unwilling to attend to hygiene  Remains fixated about her breathing, repeatedly asking for her PO2 to be checked, even though no evidence of distress  Worries the Clozaril will compromise her breathing  Reviewed w/her that dose now split between 1600 & HS which make it more tolerable, will be more alert  Again encouraged her to get up, move around to help her breathing as constant laying can lead to pneumonia, blood clots, muscle wasting  "I know that " Again presented plan for her to walk from room to dining room, rest, return to room, rest, repeat  Acknowledges, but, no move to follow through  Did come out for meal, ate 100%  Did come to window for supper medicine  She is pleasant   Has not responded to summons for PM Group or earlier Movie Social

## 2019-08-09 NOTE — PROGRESS NOTES
Individual appears to have slept comfortably remainder of the night  No distress noted during routine q15min rounding  No verbalization of questions or concerns  At the time of this writing, noted intermittent coughing  RN offered interventions to assist in alleviating same but declined  Will continue to monitor

## 2019-08-09 NOTE — PLAN OF CARE
Problem: Alteration in Thoughts and Perception  Goal: Verbalize thoughts and feelings  Description  Interventions:  - Promote a nonjudgmental and trusting relationship with the patient through active listening and therapeutic communication  - Assess patient's level of functioning, behavior and potential for risk  - Engage patient in 1 on 1 interactions for a minimum of 15 minutes each session  - Encourage patient to express fears, feelings, frustrations, and discuss symptoms    - [place] patient to reality, help patient recognize reality-based thinking   - Administer medications as ordered and assess for potential side effects  - Provide the patient education related to the signs and symptoms of the illness and desired effects of prescribed medications  Outcome: Not Progressing  Goal: Agree to be compliant with medication regime, as prescribed and report medication side effects  Description  Interventions:  - Offer appropriate PRN medication and supervise ingestion; conduct aims, as needed   Outcome: Progressing  Goal: Complete daily ADLs, including personal hygiene independently, as able  Description  Interventions:  - Observe, teach, and assist patient with ADLS  - Monitor and promote a balance of rest/activity, with adequate nutrition and elimination   Outcome: Not Progressing     Problem: Ineffective Coping  Goal: Participates in unit activities  Description  Interventions:  - Provide therapeutic environment   - Provide required programming   - Redirect inappropriate behaviors   Outcome: Not Progressing     Problem: Depression  Goal: Refrain from isolation  Description  Interventions:  - Develop a trusting relationship   - Encourage socialization   Outcome: Not Progressing    8/9 7-3 Shift  Fall and Safe Precautions  Katty Bowles has been isolative to her room for the majority of the shift, minimal interactions with peers but pleasant  No groups attended and she did not tend to her hygiene   Appetite remains intact, with no complaints or discomfort reported or observed by this writer at the this time of documentation  Continuing to encourage fluid intake and staying out of bed but Marfahadnataly Duncan verbalizes that her COPD is what is holding her back even though staff reassures her by checking her oxygen saturation levels to help ease her anxiety but maintains to be preoccupied about her respiratory condition  Will continue to monitor for changes

## 2019-08-10 RX ADMIN — CLOZAPINE 100 MG: 100 TABLET ORAL at 21:51

## 2019-08-10 RX ADMIN — PANTOPRAZOLE SODIUM 40 MG: 40 TABLET, DELAYED RELEASE ORAL at 06:02

## 2019-08-10 RX ADMIN — FLUOXETINE 10 MG: 10 CAPSULE ORAL at 08:12

## 2019-08-10 RX ADMIN — THEOPHYLLINE ANHYDROUS 200 MG: 200 CAPSULE, EXTENDED RELEASE ORAL at 08:12

## 2019-08-10 RX ADMIN — CLOZAPINE 50 MG: 25 TABLET ORAL at 16:40

## 2019-08-10 RX ADMIN — LEVOTHYROXINE SODIUM 125 MCG: 125 TABLET ORAL at 06:02

## 2019-08-10 RX ADMIN — FLUTICASONE FUROATE AND VILANTEROL TRIFENATATE 1 PUFF: 200; 25 POWDER RESPIRATORY (INHALATION) at 08:12

## 2019-08-10 NOTE — PROGRESS NOTES
2150 Katty Pong came out from room to get/eat HS snack  Did present herself to the window for HS medicine  Now wearing her QHS nasal O2 @ 1L via cannula/compressor  PO2 prior to application was 38%

## 2019-08-10 NOTE — PROGRESS NOTES
Psychiatry Progress Note    Subjective: Interval History     Patient isolative to her room  Patient with poor hygiene despite ongoing encouragement  Continues to be somatically preoccupied with her respiratory rate  Staff continues to provide education and support  Has not been attending unit programming  Does come out for meals and medications and then immediately retreat back to her room       Behavior over the last 24 hours:  unchanged  Sleep: normal  Appetite: normal  Medication side effects: No  ROS: no complaints    Current medications:    Current Facility-Administered Medications:     acetaminophen (TYLENOL) tablet 650 mg, 650 mg, Oral, Q6H PRN, Isidoro Gonzalez MD    acetaminophen (TYLENOL) tablet 650 mg, 650 mg, Oral, Q6H PRN, Isidoro Gonzalez MD    acetaminophen (TYLENOL) tablet 650 mg, 650 mg, Oral, Q6H PRN, Isidoro Gonzalez MD    albuterol (PROVENTIL HFA,VENTOLIN HFA) inhaler 2 puff, 2 puff, Inhalation, Q4H PRN, Isidoro Gonzalez MD, 2 puff at 07/25/19 1200    benzonatate (TESSALON PERLES) capsule 100 mg, 100 mg, Oral, TID PRN, Isidoro Gonzalez MD    cloZAPine (CLOZARIL) tablet 100 mg, 100 mg, Oral, HS, Isidoro Gonzalez MD, 100 mg at 08/09/19 2145    cloZAPine (CLOZARIL) tablet 50 mg, 50 mg, Oral, Before Corby Cazares MD, 50 mg at 08/09/19 1750    FLUoxetine (PROzac) capsule 10 mg, 10 mg, Oral, Daily, Isidoro Gonzalez MD, 10 mg at 08/10/19 5417    fluticasone-vilanterol (BREO ELLIPTA) 200-25 MCG/INH inhaler 1 puff, 1 puff, Inhalation, Daily, Isidoro Gonzalez MD, 1 puff at 08/10/19 9416    levothyroxine tablet 125 mcg, 125 mcg, Oral, Early Morning, Isidoro Gonzalez MD, 125 mcg at 08/10/19 0602    OLANZapine (ZyPREXA) tablet 5 mg, 5 mg, Oral, Q8H PRN, Isidoro Gonzalez MD    pantoprazole (PROTONIX) EC tablet 40 mg, 40 mg, Oral, Early Morning, Isidoro Gonzalez MD, 40 mg at 08/10/19 0602    theophylline (JEF-24) 24 hr capsule 200 mg, 200 mg, Oral, Daily, Isidoro Gonzalez MD, 200 mg at 08/10/19 0812    traZODone (DESYREL) tablet 25 mg, 25 mg, Oral, HS PRN, Faheem Daniels MD    Current Problem List:    Patient Active Problem List   Diagnosis    [facility]    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    Tobacco use disorder, continuous    Bipolar disorder ([facility]    Left hip pain    Lactic acidosis    Hypokalemia    Hypomagnesemia    Compression fracture of L4 lumbar vertebra    Thoracic compression fracture (HCC)    Ventral hernia    Parapneumonic effusion    Acute on chronic respiratory failure with hypoxia (HCC)    Chronic respiratory failure (HCC)    Hypophosphatemia    Elevated MCV    Compression deformity of vertebra    Schizoaffective disorder, bipolar type (HCC)    Acquired hypothyroidism    Gastroesophageal reflux disease without esophagitis    Abnormal CT of the chest    Excessive cerumen in left ear canal    Lipoma of right upper extremity    Polydipsia    Localized swelling of both lower legs    Noncompliant with deep vein thrombosis (DVT) prophylaxis    Allergic conjunctivitis of right eye       Problem list reviewed 08/10/19     Objective:     Vital Signs:  Vitals:    08/09/19 0730 08/09/19 2150 08/10/19 0720 08/10/19 0721   BP: 133/78  128/71 120/74   BP Location: Left arm  Right arm Right arm   Pulse: 90  94 100   Resp: 17  18    Temp: (!) 97 2 °F (36 2 °C)  97 6 °F (36 4 °C)    TempSrc: Temporal  Temporal    SpO2:  94%     Weight:       Height:             Appearance:  age appropriate, casually dressed and disheveled   Behavior:  normal   Speech:  normal volume   Mood:  constricted   Affect:  constricted   Thought Process:  normal   Thought Content:  normal   Perceptual Disturbances: None   Risk Potential: none   Sensorium:  person, place and time   Cognition:  intact   Consciousness:  alert and awake    Attention: attention span and concentration were age appropriate   Intellect: average   Insight:  limited   Judgment: limited      Motor Activity: no abnormal movements       I/O Past 24 hours:  No intake/output data recorded  No intake/output data recorded  Labs:  Reviewed 08/10/19    Progress Toward Goals:  unchanged    Assessment / Plan:     Schizoaffective disorder, bipolar type ([facility] [address] )    Recommended Treatment:      Medication changes:  1) continue current treatment plan    Non-pharmacological treatments  1) Continue with group therapy, milieu therapy and occupational therapy  Safety  1) Safety/communication plan established targeting dynamic risk factors above  2) Risks, benefits, and possible side effects of medications explained to patient and patient verbalizes understanding  Counseling / Coordination of Care    Total floor / unit time spent today 20 minutes  Greater than 50% of total time was spent with the patient and / or family counseling and / or coordination of care  A description of the counseling / coordination of care  Patient's Rights, confidentiality and exceptions to confidentiality, use of automated medical record, [facility] staff access to medical record, and consent to treatment reviewed      Sae Bhatt PA-C

## 2019-08-11 RX ADMIN — FLUOXETINE 10 MG: 10 CAPSULE ORAL at 08:06

## 2019-08-11 RX ADMIN — THEOPHYLLINE ANHYDROUS 200 MG: 200 CAPSULE, EXTENDED RELEASE ORAL at 08:06

## 2019-08-11 RX ADMIN — CLOZAPINE 50 MG: 25 TABLET ORAL at 16:23

## 2019-08-11 RX ADMIN — PANTOPRAZOLE SODIUM 40 MG: 40 TABLET, DELAYED RELEASE ORAL at 06:26

## 2019-08-11 RX ADMIN — FLUTICASONE FUROATE AND VILANTEROL TRIFENATATE 1 PUFF: 200; 25 POWDER RESPIRATORY (INHALATION) at 08:07

## 2019-08-11 RX ADMIN — LEVOTHYROXINE SODIUM 125 MCG: 125 TABLET ORAL at 06:26

## 2019-08-11 RX ADMIN — CLOZAPINE 100 MG: 100 TABLET ORAL at 21:43

## 2019-08-11 NOTE — PROGRESS NOTES
Psychiatry Progress Note    Subjective: Interval History     Patient expressing her discontent for being advised that she needs to shower and complete her ADLs today  Patient attempting to make multiple excuses why she cannot shower  Patient reporting that they do not have the type a chair she needs and that she needs to shower head that is not connecting from the wall  Patient reports that she is feeling depressed and anxious today  Stating that her symptoms are due to her living situation  Patient unwilling to further elaborate  Denying any suicidal ideations, homicidal ideations  Patient has been medication and meal compliant      Behavior over the last 24 hours:  unchanged  Sleep: normal  Appetite: normal  Medication side effects: No  ROS: no complaints    Current medications:    Current Facility-Administered Medications:     acetaminophen (TYLENOL) tablet 650 mg, 650 mg, Oral, Q6H PRN, Isidoro Gonzalez MD    acetaminophen (TYLENOL) tablet 650 mg, 650 mg, Oral, Q6H PRN, Isidoro Gonzalez MD    acetaminophen (TYLENOL) tablet 650 mg, 650 mg, Oral, Q6H PRN, Isidoro Gonzalez MD    albuterol (PROVENTIL HFA,VENTOLIN HFA) inhaler 2 puff, 2 puff, Inhalation, Q4H PRN, Isidoro Gonzalez MD, 2 puff at 07/25/19 1200    benzonatate (TESSALON PERLES) capsule 100 mg, 100 mg, Oral, TID PRN, Isidoro Gonzalez MD    cloZAPine (CLOZARIL) tablet 100 mg, 100 mg, Oral, HS, Isidoro Gonzalez MD, 100 mg at 08/10/19 2151    cloZAPine (CLOZARIL) tablet 50 mg, 50 mg, Oral, Before Corby Cazares MD, 50 mg at 08/10/19 1640    FLUoxetine (PROzac) capsule 10 mg, 10 mg, Oral, Daily, Isidoro Gonzalez MD, 10 mg at 08/11/19 0806    fluticasone-vilanterol (BREO ELLIPTA) 200-25 MCG/INH inhaler 1 puff, 1 puff, Inhalation, Daily, Isidoro Gonzalez MD, 1 puff at 08/11/19 0807    levothyroxine tablet 125 mcg, 125 mcg, Oral, Early Morning, Isidoro Gonzalez MD, 125 mcg at 08/11/19 0626    OLANZapine (ZyPREXA) tablet 5 mg, 5 mg, Oral, Q8H PRN, MD Gee Merino pantoprazole (PROTONIX) EC tablet 40 mg, 40 mg, Oral, Early Morning, Shilpa Trujillo MD, 40 mg at 08/11/19 6481    theophylline (JEF-24) 24 hr capsule 200 mg, 200 mg, Oral, Daily, Shilpa Trujillo MD, 200 mg at 08/11/19 0806    traZODone (DESYREL) tablet 25 mg, 25 mg, Oral, HS PRN, Shilpa Trujillo MD    Current Problem List:    Patient Active Problem List   Diagnosis    [facility] [address] [facility]    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[facility] [address] )    Tobacco use disorder, continuous    [facility]    Left hip pain    Lactic acidosis    Hypokalemia    Hypomagnesemia    Compression fracture of L4 lumbar vertebra    Thoracic compression fracture (HCC)    Ventral hernia    Parapneumonic effusion    Acute on chronic respiratory failure with hypoxia (HCC)    Chronic respiratory failure (HCC)    Hypophosphatemia    Elevated MCV    Compression deformity of vertebra    Schizoaffective disorder, bipolar type (HCC)    Acquired hypothyroidism    Gastroesophageal reflux disease without esophagitis    Abnormal CT of the chest    Excessive cerumen in left ear canal    Lipoma of right upper extremity    Polydipsia    Localized swelling of both lower legs    Noncompliant with deep vein thrombosis (DVT) prophylaxis    Allergic conjunctivitis of right eye       Problem list reviewed 08/11/19     Objective:     Vital Signs:  Vitals:    08/10/19 0720 08/10/19 0721 08/11/19 0730 08/11/19 0732   BP: 128/71 120/74 137/71 127/65   BP Location: Right arm Right arm Left arm Left arm   Pulse: 94 100 99 102   Resp: 18  18    Temp: 97 6 °F (36 4 °C)  98 2 °F (36 8 °C)    TempSrc: Temporal  Temporal    SpO2:       Weight:   67 6 kg (149 lb)    Height:             Appearance:  age appropriate, casually dressed and disheveled   Behavior:  normal   Speech:  normal volume   Mood:  constricted   Affect:  constricted   Thought Process:  normal   Thought Content:  normal   Perceptual Disturbances: None   Risk Potential: none   Sensorium:  person, place and time Cognition:  intact   Consciousness:  alert and awake    Attention: attention span and concentration were age appropriate   Intellect: average   Insight:  limited   Judgment: limited      Motor Activity: no abnormal movements       I/O Past 24 hours:  No intake/output data recorded  No intake/output data recorded  Labs:  Reviewed 08/11/19    Progress Toward Goals:  unchanged    Assessment / Plan:     Schizoaffective disorder, bipolar type ([facility] [address] )    Recommended Treatment:      Medication changes:  1) continue current treatment plan    Non-pharmacological treatments  1) Continue with group therapy, milieu therapy and occupational therapy  Safety  1) Safety/communication plan established targeting dynamic risk factors above  2) Risks, benefits, and possible side effects of medications explained to patient and patient verbalizes understanding  Counseling / Coordination of Care    Total floor / unit time spent today 20 minutes  Greater than 50% of total time was spent with the patient and / or family counseling and / or coordination of care  A description of the counseling / coordination of care  Patient's Rights, confidentiality and exceptions to confidentiality, use of automated medical record, [facility] staff access to medical record, and consent to treatment reviewed      Maria Dolores Clark PA-C

## 2019-08-11 NOTE — PROGRESS NOTES
Pt sleeping throughout shift  No PRNs requested  No behaviors or issues noted  Compliant with O2 concentrator  Fall precautions maintained  Will continue to monitor

## 2019-08-11 NOTE — PLAN OF CARE
Problem: Alteration in Thoughts and Perception  Goal: Attend and participate in unit activities, including therapeutic, recreational, and educational groups  Description  Interventions:  - Provide therapeutic and educational activities daily, encourage attendance and participation, and document same in the medical record   Outcome: Not Progressing  Goal: Complete daily ADLs, including personal hygiene independently, as able  Description  Interventions:  - Observe, teach, and assist patient with ADLS  - Monitor and promote a balance of rest/activity, with adequate nutrition and elimination   Outcome: Not Progressing     Individual has been the shift in her room, visible only at meal times and med pass  Continues to be needy and struggles to complete tasks independently  She continues to be worrisome related to the need for oxygen, but levels have been WNL  Last pulse ox at 1115 94%  She was showered with much resistance this morning  She has been noted to be social with roommate when both are in the room  Compliant with medications  Appetite good, 100% for both meals  Able to express needs, encouraged to verbalize feelings  Availability of staff made known  Will continue to monitor

## 2019-08-12 LAB
CLOZAPINE SERPL-MCNC: 326 NG/ML (ref 350–650)
CLOZAPINE+NOR SERPL-MCNC: 517 NG/ML
NORCLOZAPINE SERPL-MCNC: 191 NG/ML

## 2019-08-12 PROCEDURE — 99232 SBSQ HOSP IP/OBS MODERATE 35: CPT | Performed by: PSYCHIATRY & NEUROLOGY

## 2019-08-12 RX ORDER — CLOZAPINE 25 MG/1
75 TABLET ORAL 2 TIMES DAILY
Status: DISCONTINUED | OUTPATIENT
Start: 2019-08-12 | End: 2019-08-16

## 2019-08-12 RX ADMIN — CLOZAPINE 75 MG: 25 TABLET ORAL at 17:43

## 2019-08-12 RX ADMIN — LEVOTHYROXINE SODIUM 125 MCG: 125 TABLET ORAL at 06:21

## 2019-08-12 RX ADMIN — CLOZAPINE 75 MG: 25 TABLET ORAL at 21:42

## 2019-08-12 RX ADMIN — PANTOPRAZOLE SODIUM 40 MG: 40 TABLET, DELAYED RELEASE ORAL at 06:21

## 2019-08-12 RX ADMIN — FLUOXETINE 10 MG: 10 CAPSULE ORAL at 08:56

## 2019-08-12 RX ADMIN — FLUTICASONE FUROATE AND VILANTEROL TRIFENATATE 1 PUFF: 200; 25 POWDER RESPIRATORY (INHALATION) at 08:57

## 2019-08-12 RX ADMIN — THEOPHYLLINE ANHYDROUS 200 MG: 200 CAPSULE, EXTENDED RELEASE ORAL at 08:56

## 2019-08-12 NOTE — PLAN OF CARE
Problem: Alteration in Thoughts and Perception  Goal: Agree to be compliant with medication regime, as prescribed and report medication side effects  Description  Interventions:  - Offer appropriate PRN medication and supervise ingestion; conduct aims, as needed   Outcome: Progressing  Goal: Complete daily ADLs, including personal hygiene independently, as able  Description  Interventions:  - Observe, teach, and assist patient with ADLS  - Monitor and promote a balance of rest/activity, with adequate nutrition and elimination   Outcome: Progressing     Problem: Risk for Self Injury/Neglect  Goal: Refrain from harming self  Description  Interventions:  - Monitor patient closely, per order  - Develop a trusting relationship  - Supervise medication ingestion, monitor effects and side effects   Outcome: Progressing     Problem: Anxiety  Goal: Anxiety is at manageable level  Description  Interventions:  - Assess and monitor patient's anxiety level  - Monitor for signs and symptoms of anxiety both physical and emotional (heart palpitations, chest pain, shortness of breath, headaches, nausea, feeling jumpy, restlessness, irritable, apprehensive)  - Collaborate with interdisciplinary team and initiate plan and interventions as ordered    - [place] patient to unit/surroundings  - Explain treatment plan  - Encourage participation in care  - Encourage verbalization of concerns/fears  - Identify coping mechanisms  - Assist in developing anxiety-reducing skills  - Administer/offer alternative therapies  - Limit or eliminate stimulants  Outcome: Progressing     Problem: Alteration in Orientation  Goal: Interact with staff daily  Description  Interventions:  - Assess and re-assess patient's level of orientation  - Engage patient in 1 on 1 interactions, daily, for a minimum of 15 minutes   - Establish rapport/trust with patient   Outcome: Progressing     Problem: PAIN - ADULT  Goal: Verbalizes/displays adequate comfort level or baseline comfort level  Description  Interventions:  - Encourage patient to monitor pain and request assistance  - Assess pain using appropriate pain scale  - Administer analgesics based on type and severity of pain and evaluate response  - Implement non-pharmacological measures as appropriate and evaluate response  - Consider cultural and social influences on pain and pain management  - Notify physician/advanced practitioner if interventions unsuccessful or patient reports new pain  Outcome: Progressing     Problem: SAFETY ADULT  Goal: Patient will remain free of falls  Description  INTERVENTIONS:  - Assess patient frequently for physical needs  -  Identify cognitive and physical deficits and behaviors that affect risk of falls  -  [place] fall precautions as indicated by assessment   - Educate patient/family on patient safety including physical limitations  - Instruct patient to call for assistance with activity based on assessment  - Modify environment to reduce risk of injury  - Consider OT/PT consult to assist with strengthening/mobility  Outcome: Progressing     Problem: RESPIRATORY - ADULT  Goal: Achieves optimal ventilation and oxygenation  Description  INTERVENTIONS:  - Assess for changes in respiratory status  - Assess for changes in mentation and behavior  - Position to facilitate oxygenation and minimize respiratory effort  - Oxygen administration by appropriate delivery method based on oxygen saturation (per order) or ABGs  - Initiate smoking cessation education as indicated  - Encourage broncho-pulmonary hygiene including cough, deep breathe, Incentive Spirometry  - Assess the need for suctioning and aspirate as needed  - Assess and instruct to report SOB or any respiratory difficulty  - Respiratory Therapy support as indicated  Outcome: Mendy Ribeiro has been in her room most of the shift  She is either laying down napping or sitting on her bed  Converses with her roommate   Social with select peers when she is out of her room for brief periods  Pleasant and cooperative upon approach  Stated that anxiety and depression ar 5/10  Has not requested prn medication  Came for medication at meal time without prompting  Took pill without difficulty  Ate 100% of her meal  No complaint of SOB or difficulty breathing  BM this shift  Did not attend evening group  Ate snack  Oxygen saturation 96% on room air prior to having oxygen 1 liter nasal cannula applied for the night  Continue to monitor  Precautions maintained

## 2019-08-12 NOTE — PROGRESS NOTES
Pharmacy Clozapine Monitoring Progress Note     Chun Carrillo is receiving 150 mg clozapine taken as 75 mg twice daily  Assessment/Plan:    Current phase of treatment is initation/titration  **If clozapine therapy is interrupted for more than 48 hours, therapy MUST be restarted at the initial titration dose to avoid hypotension, bradycardia, and syncope  **    Patient is eligible to receive clozapine and the [facility] monitoring frequency is weekly per clozapine REMS  The most recent [facility] was   Lab Results   Component Value Date    NEUTROABS 5 30 08/09/2019    and the next lab is due on 8/16/19  Last Clozapine level (if available):    No results found for: CLOZAPINE  No results found for: NCLOZIP    Pharmacist Recommendations: The patient  does not need lab values ordered at this time  Based on the table below, pharmacy recommends the following: Assess CBC w/diff ordered for 8/16/19  Consider ordering a prophylactic bowel regimen and repeat EKG in one weeks since Kfw=718 ms on 7/17/19  Monitoring: Adverse Effect Suggested Monitoring Patient's Status    Agranulocytosis ANC baseline and then repeat weekly for first 6 months and every 2 weeks for the second 6 months  Maintenance after one year of therapy every month  The most recent [facility] was   Lab Results   Component Value Date    NEUTROABS 5 30 08/09/2019         This ANC is considered normal range (ANC >/= 1500/mcL)  Continue current treatment and monitoring course      Respiratory Depression Please ensure patient is not on concomitant respiratory lowering medications such as benzodiazepines, sedative hypnotics (eg  zolpidem) This patient is not on respiratory depression lowering medications   Myocarditis Baseline and weekly EKG, CRP, BNP, and troponin  Weekly symptomatic complaints of fatigue, dyspnea, tachycardia, chest pain, palpitations, and fever for the first 4 weeks    Last EKG Results on 7/17 showed:  No Noted Abnormalities    Last QTC on 7/17 was   0   Lab Value Date/Time    QTCINT 481 07/17/2019 0617         Last BNP was No results found for: NTBNP    Last Troponin was  0   Lab Value Date/Time    TROPONINI <0 01 05/01/2019 1318    TROPONINI <0 04 05/10/2015 1948        Orthostatic hypotension/  bradycardia Blood pressure and vital signs      Monitor blood pressure and orthostatic hypotension at least twice daily upon initiation and continue to follow at least once daily afterwards  /59 (BP Location: Left arm)   Pulse (!) 107   Temp 97 6 °F (36 4 °C) (Temporal)   Resp 18   Ht 5' 4" (1 626 m)   Wt 67 6 kg (149 lb)   SpO2 91%   BMI 25 58 kg/m²             Constipation (bowel obstruction) Assess at baseline and weekly during first four months of therapy  Docusate 100mg BID and Miralax 17 grams daily recommended initially  Prophylactic bowel regimen is not ordered  Sialorrhea Assess at baseline and weekly during first four months of therapy  May be managed using sublingual anticholinergic preparations (atropine 1% eye drops)  Patient endorsed drooling as seen in the progress notes 8/12/19, but refuses medications to help treat   This will continue to be monitored  Please note that per current REMS protocol the provider can submit a treatment rationale that justifies clozapine treatment even if patient parameters are outside of REMS recommendations  The Clozapine REMS is an FDA-mandated program implemented by the manufacturers of clozapine  ([employer]  com/CpmgClozapineUI/home  u)  Pharmacy will continue to follow patient with team, thank you    Electronically signed by: Olena Alexander, Pharmacist

## 2019-08-12 NOTE — PLAN OF CARE
Problem: Alteration in Thoughts and Perception  Goal: Verbalize thoughts and feelings  Description  Interventions:  - Promote a nonjudgmental and trusting relationship with the patient through active listening and therapeutic communication  - Assess patient's level of functioning, behavior and potential for risk  - Engage patient in 1 on 1 interactions for a minimum of 15 minutes each session  - Encourage patient to express fears, feelings, frustrations, and discuss symptoms    - [place] patient to reality, help patient recognize reality-based thinking   - Administer medications as ordered and assess for potential side effects  - Provide the patient education related to the signs and symptoms of the illness and desired effects of prescribed medications  Outcome: Progressing  Goal: Agree to be compliant with medication regime, as prescribed and report medication side effects  Description  Interventions:  - Offer appropriate PRN medication and supervise ingestion; conduct aims, as needed   Outcome: Progressing     Problem: Alteration in Thoughts and Perception  Goal: Treatment Goal: Gain control of psychotic behaviors/thinking, reduce/eliminate presenting symptoms and demonstrate improved reality functioning upon discharge  Outcome: Not Progressing  Goal: Recognize dysfunctional thoughts, communicate reality-based thoughts at the time of discharge  Description  Interventions:  - Provide medication and psycho-education to assist patient in compliance and developing insight into his/her illness   Outcome: Not Progressing  Goal: Complete daily ADLs, including personal hygiene independently, as able  Description  Interventions:  - Observe, teach, and assist patient with ADLS  - Monitor and promote a balance of rest/activity, with adequate nutrition and elimination   Outcome: Not Progressing   Patient is pleasant and cooperative this morning    She is mostly Isolative to her room but does come out for medications and meals when prompted  She sleeps most of the day  She continues to be somatic r/t her COPD  She is constantly stating that she is SOB and needs O2 however, when this writer assess her her SPO2 is 92%, her lungs are clear throughout and she is nit using any accessory muscles to breathe  Attended and participated in treatment team this shift  Continue to monitor

## 2019-08-12 NOTE — PROGRESS NOTES
08/12/19 1200   Team Meeting   Meeting Type Tx Team Meeting   Initial Conference Date 08/12/19   Next Conference Date 08/19/19   Team Members Present   Team Members Present Physician;Nurse;; Other (Discipline and Name)   Physician Team Member Dr Wild Wesley Team Member Carter Emerson, RN   Care Management Team Member Brenda Rodriguez   Other (Discipline and Name) [facility]; Bryson Mohan, [address] - Certified peer   Patient/Family Present   Patient Present Yes   Patient's Family Present No     Patient attended treatment team meeting this morning without self-assessment  Patient is fixated on her oxygen and health problems  She is difficult to redirect from this topic of conversation  She was encouraged to speak with her medical team about medical issues, but to discuss mental health recovery in team meetings  Patient is unwilling to acknowledge how her mental health may play a role when it comes to her physical issues  Patient does not accept her mental illness, despite beginning her course of hospitalizations at age 17  Patient was encouraged to attend groups, but does not believe she needs to attend because they are "boring" and because she has "heard it all before "  Patient requested a community pass, and the expectations for earning a pass were reiterated  Team and patient completed risk assessment and the patient did not verbalize any desire to elope from the program   Patient verbalized understanding of consequences of eloping from treatment while on a commitment  Patient verbalized no further questions or concerns at the conclusion of the meeting  Next team meeting scheduled for 8/19

## 2019-08-12 NOTE — PROGRESS NOTES
Progress Note - Rosana Lindsay 1955, 60 y o  female MRN: [id number]    [facility] [address] Encounter: [id number]    Primary Care Provider: Christ Munoz MD   Date and time admitted to hospital: 7/23/2019  5:30 PM        * Schizoaffective disorder, bipolar type [facility])  Assessment & Plan  Psychiatry Progress Note    Subjective: Interval History     Patient feels that she is not is not attending any groups and is basically staying on her bed and even refusing showers, but is conversing with her roommate  She  still focused on the need for oxygen with some somatic preoccupation about the same and continues to insist that she cannot breathe or take showers on her own despite staff offering support and assistance and  pulse ox being acceptable most of the time  She has chosen not to to use the portable oxygen to get out of bed and go to groups if necessary  She complains of feeling tired and some drooling but refuses to add any medications to take care of it when offered but feel splitting the dose was some what helpful  She still appears to harbor some paranoia  She is still points to the lipoma on her and claims that it is a micro chip implanted on  She still appears to have some paranoia about the staff accusing them of not give giving her the right inhalant and she is again refusing to take the Atrovent claiming that there is "peanut oil in it  She has been sleeping well and has been using the nasal oxygen at night  She speaks in a monotonous, stilted and  rambling manner as usual as if talking in a court of law    She is compliant with medications so far but is questioning whether she needs the clozapine at this time  No current suicidal homicidal thoughts intent or plans reported    She is casually dressed fairly groomed but is guarded suspicious evasive been in denial of her illness as usual   No overt hallucinations or well systematized delusions reported other than some underlying paranoid delusions and beliefs that there is a micro chip implanted on her left forearm which has not changed yet   No signs or sxs of agranulocytosis or myocarditis or endocarditis and she is moving her bowels so far with no issues    Focus has been to encouraged her to accept her illness as and to cooperate with titrating the clozapine dose but reluctant to increase the dose now  Current medications:    Current Facility-Administered Medications:     acetaminophen (TYLENOL) tablet 650 mg, 650 mg, Oral, Q6H PRN, Roberto Shankar MD    acetaminophen (TYLENOL) tablet 650 mg, 650 mg, Oral, Q6H PRN, Roberto Shankar MD    acetaminophen (TYLENOL) tablet 650 mg, 650 mg, Oral, Q6H PRN, Roberto Shankar MD    albuterol (PROVENTIL HFA,VENTOLIN HFA) inhaler 2 puff, 2 puff, Inhalation, Q4H PRN, Roberto Shankar MD, 2 puff at 07/25/19 1200    benzonatate (TESSALON PERLES) capsule 100 mg, 100 mg, Oral, TID PRN, Roberto Shankar MD    cloZAPine (CLOZARIL) tablet 100 mg, 100 mg, Oral, HS, Roberto Shankar MD, 100 mg at 08/11/19 2143    cloZAPine (CLOZARIL) tablet 50 mg, 50 mg, Oral, Before Epiosvaldo Contreras MD, 50 mg at 08/11/19 1623    FLUoxetine (PROzac) capsule 10 mg, 10 mg, Oral, Daily, Roberto Shankar MD, 10 mg at 08/12/19 0856    fluticasone-vilanterol (BREO ELLIPTA) 200-25 MCG/INH inhaler 1 puff, 1 puff, Inhalation, Daily, Roberto Shankar MD, 1 puff at 08/12/19 0857    levothyroxine tablet 125 mcg, 125 mcg, Oral, Early Morning, Roberto Shankar MD, 125 mcg at 08/12/19 8440    OLANZapine (ZyPREXA) tablet 5 mg, 5 mg, Oral, Q8H PRN, Roberto Shankar MD    pantoprazole (PROTONIX) EC tablet 40 mg, 40 mg, Oral, Early Morning, Roberto Shankar MD, 40 mg at 08/12/19 1712    theophylline (JEF-24) 24 hr capsule 200 mg, 200 mg, Oral, Daily, Roberto Shankar MD, 200 mg at 08/12/19 0856    traZODone (DESYREL) tablet 25 mg, 25 mg, Oral, HS PRN, Roberto Shankar MD  Justification if on more than two antipsychotics:  Only on his clozapine  Side effects if any:  None    Risks , benefits, side effects and precautions of medications discussed with patient and reminded patient to let us know any problems with medications     Objective:     Vital Signs:  Vitals:    08/11/19 0730 08/11/19 0732 08/12/19 0700 08/12/19 0705   BP: 137/71 127/65 126/76 108/59   BP Location: Left arm Left arm Left arm Left arm   Pulse: 99 102 104 (!) 107   Resp: 18  18 18   Temp: 98 2 °F (36 8 °C)  97 6 °F (36 4 °C)    TempSrc: Temporal  Temporal    SpO2:   91%    Weight: 67 6 kg (149 lb)      Height:         Appearance:  age appropriate, casually dressed and overweight older than stated age   Behavior:  deviant, evasive and guarded and suspicious but with good eye contact   Speech:  normal pitch and normal volume but tends to become loud at times   Mood:  anxious and dysthymic   Affect:  mood-congruent   Thought Process:  goal directed and illogical slightly pressured but able to be redirected   Thought Content:  delusions  grandiose and somatic about not being able to breathe despite being on nasal oxygen and paranoid about people out to harm her and Atrovent inhaler tainteded with peanut oil  No current suicidal homicidal thoughts intent or plans reported  No phobias obsessions or compulsions reported   Perceptual Disturbances: None and does not appear responding to internal stimuli   Risk Potential: Tendency to not care for herself if she goes off treatment   Sensorium:  person, place, time/date, situation, day of week, month of year and time   Cognition:  grossly intact   Consciousness:  alert and awake    Attention: Intact concentration and attention span   Intellect: Considered to be at least of average intelligence   Insight:  limited in denial   Judgment: poor      Motor Activity: no abnormal movements         Recent Labs:  Results Reviewed     None          I/O Past 24 hours:  No intake/output data recorded  No intake/output data recorded          Assessment / Plan:     Schizoaffective disorder, bipolar [facility])      Reason for continued inpatient care:  Because of underlying paranoia and refusal to go back to the personal care home and inability to care for herself on her own  Acceptance by patient:  Accepting  [place] in recovery:  About living in another personal care home once she feels better  Understanding of medications :  Has some understanding  Involved in reintegration process:  Adjusting to the unit  Trusting in relatoinship with psychiatrist:  Trusting    Recommended Treatment:    Medication changes:  1) switch clozapine as 75 mg po 4 pm and 8 pm and see if she feels less tired in am  Non-pharmacological treatments  1) start individual therapy group therapy, milieu therapy and occupational therapy and milieu therapy involving multidisciplinary team approach with psychotherapist, case management, nursing, peer support services, art therapist etc using recovery principles and psycho-education about accepting illness and need for treatment   Safety  1) Safety/communication plan established targeting dynamic risk factors above  Discharge Plan most likely back to the a:  Personal care boarding home with act services once her delusions are managed and mood becomes more stabilized and she becomes more receptive to treatment compliance    Counseling / Coordination of Care    Total floor / unit time spent today 15 minutes  Greater than 50% of total time was spent with the patient and / or family counseling and / or coordination of care  A description of the counseling / coordination of care  Patient's Rights, confidentiality and exceptions to confidentiality, use of automated medical record, [facility] staff access to medical record, and consent to treatment reviewed      Sarah Hanna MD

## 2019-08-12 NOTE — ASSESSMENT & PLAN NOTE
Psychiatry Progress Note    Subjective: Interval History     Patient feels that she is not is not attending any groups and is basically staying on her bed and even refusing showers, but is conversing with her roommate  She  still focused on the need for oxygen with some somatic preoccupation about the same and continues to insist that she cannot breathe or take showers on her own despite staff offering support and assistance and  pulse ox being acceptable most of the time  She has chosen not to to use the portable oxygen to get out of bed and go to groups if necessary  She complains of feeling tired and some drooling but refuses to add any medications to take care of it when offered but feel splitting the dose was some what helpful  She still appears to harbor some paranoia  She is still points to the lipoma on her and claims that it is a micro chip implanted on  She still appears to have some paranoia about the staff accusing them of not give giving her the right inhalant and she is again refusing to take the Atrovent claiming that there is "peanut oil in it  She has been sleeping well and has been using the nasal oxygen at night  She speaks in a monotonous, stilted and  rambling manner as usual as if talking in a court of law    She is compliant with medications so far but is questioning whether she needs the clozapine at this time  No current suicidal homicidal thoughts intent or plans reported  She is casually dressed fairly groomed but is guarded suspicious evasive been in denial of her illness as usual   No overt hallucinations or well systematized delusions reported other than some underlying paranoid delusions and beliefs that there is a micro chip implanted on her left forearm which has not changed yet   No signs or sxs of agranulocytosis or myocarditis or endocarditis and she is moving her bowels so far with no issues    Focus has been to encouraged her to accept her illness as and to cooperate with titrating the clozapine dose but reluctant to increase the dose now  Current medications:    Current Facility-Administered Medications:     acetaminophen (TYLENOL) tablet 650 mg, 650 mg, Oral, Q6H PRN, Alirio Frazier MD    acetaminophen (TYLENOL) tablet 650 mg, 650 mg, Oral, Q6H PRN, Alirio Frazier MD    acetaminophen (TYLENOL) tablet 650 mg, 650 mg, Oral, Q6H PRN, Alirio Frazier MD    albuterol (PROVENTIL HFA,VENTOLIN HFA) inhaler 2 puff, 2 puff, Inhalation, Q4H PRN, Alirio Frazier MD, 2 puff at 07/25/19 1200    benzonatate (TESSALON PERLES) capsule 100 mg, 100 mg, Oral, TID PRN, Alirio Frazier MD    cloZAPine (CLOZARIL) tablet 100 mg, 100 mg, Oral, HS, Alirio Frazier MD, 100 mg at 08/11/19 2143    cloZAPine (CLOZARIL) tablet 50 mg, 50 mg, Oral, Before Armando Corbni MD, 50 mg at 08/11/19 1623    FLUoxetine (PROzac) capsule 10 mg, 10 mg, Oral, Daily, Alirio Frazier MD, 10 mg at 08/12/19 0856    fluticasone-vilanterol (BREO ELLIPTA) 200-25 MCG/INH inhaler 1 puff, 1 puff, Inhalation, Daily, Alirio Frazier MD, 1 puff at 08/12/19 0857    levothyroxine tablet 125 mcg, 125 mcg, Oral, Early Morning, Alirio Frazier MD, 125 mcg at 08/12/19 3364    OLANZapine (ZyPREXA) tablet 5 mg, 5 mg, Oral, Q8H PRN, Alirio Frazier MD    pantoprazole (PROTONIX) EC tablet 40 mg, 40 mg, Oral, Early Morning, Alirio Frazier MD, 40 mg at 08/12/19 7732    theophylline (JEF-24) 24 hr capsule 200 mg, 200 mg, Oral, Daily, Alirio Frazier MD, 200 mg at 08/12/19 0856    traZODone (DESYREL) tablet 25 mg, 25 mg, Oral, HS PRN, Alirio Frazier MD  Justification if on more than two antipsychotics:  Only on his clozapine  Side effects if any:  None    Risks , benefits, side effects and precautions of medications discussed with patient and reminded patient to let us know any problems with medications     Objective:     Vital Signs:  Vitals:    08/11/19 0730 08/11/19 0732 08/12/19 0700 08/12/19 0705   BP: 137/71 127/65 126/76 108/59   BP Location: Left arm Left arm Left arm Left arm   Pulse: 99 102 104 (!) 107   Resp: 18  18 18   Temp: 98 2 °F (36 8 °C)  97 6 °F (36 4 °C)    TempSrc: Temporal  Temporal    SpO2:   91%    Weight: 67 6 kg (149 lb)      Height:         Appearance:  age appropriate, casually dressed and overweight older than stated age   Behavior:  deviant, evasive and guarded and suspicious but with good eye contact   Speech:  normal pitch and normal volume but tends to become loud at times   Mood:  anxious and dysthymic   Affect:  mood-congruent   Thought Process:  goal directed and illogical slightly pressured but able to be redirected   Thought Content:  delusions  grandiose and somatic about not being able to breathe despite being on nasal oxygen and paranoid about people out to harm her and Atrovent inhaler tainteded with peanut oil  No current suicidal homicidal thoughts intent or plans reported  No phobias obsessions or compulsions reported   Perceptual Disturbances: None and does not appear responding to internal stimuli   Risk Potential: Tendency to not care for herself if she goes off treatment   Sensorium:  person, place, time/date, situation, day of week, month of year and time   Cognition:  grossly intact   Consciousness:  alert and awake    Attention: Intact concentration and attention span   Intellect: Considered to be at least of average intelligence   Insight:  limited in denial   Judgment: poor      Motor Activity: no abnormal movements         Recent Labs:  Results Reviewed     None          I/O Past 24 hours:  No intake/output data recorded  No intake/output data recorded          Assessment / Plan:     Schizoaffective disorder, bipolar type ([facility] )      Reason for continued inpatient care:  Because of underlying paranoia and refusal to go back to the personal care home and inability to care for herself on her own  Acceptance by patient:  Accepting  [place] in recovery:  About living in another personal care home once she feels better  Understanding of medications :  Has some understanding  Involved in reintegration process:  Adjusting to the unit  Trusting in relatoinship with psychiatrist:  Trusting    Recommended Treatment:    Medication changes:  1) switch clozapine as 50 mg at 4:00 p m  And 100 mg at bedtime to see if that would decrease her tiredness in the morning  Non-pharmacological treatments  1) start individual therapy group therapy, milieu therapy and occupational therapy and milieu therapy involving multidisciplinary team approach with psychotherapist, case management, nursing, peer support services, art therapist etc using recovery principles and psycho-education about accepting illness and need for treatment   Safety  1) Safety/communication plan established targeting dynamic risk factors above  Discharge Plan most likely back to the a:  Personal care boarding home with act services once her delusions are managed and mood becomes more stabilized and she becomes more receptive to treatment compliance    Counseling / Coordination of Care    Total floor / unit time spent today 15 minutes  Greater than 50% of total time was spent with the patient and / or family counseling and / or coordination of care  A description of the counseling / coordination of care  Patient's Rights, confidentiality and exceptions to confidentiality, use of automated medical record, [address] [place] [address] staff access to medical record, and consent to treatment reviewed      Meldon Curling, MD

## 2019-08-13 PROCEDURE — 93005 ELECTROCARDIOGRAM TRACING: CPT

## 2019-08-13 PROCEDURE — 99232 SBSQ HOSP IP/OBS MODERATE 35: CPT | Performed by: PSYCHIATRY & NEUROLOGY

## 2019-08-13 RX ADMIN — FLUTICASONE FUROATE AND VILANTEROL TRIFENATATE 1 PUFF: 200; 25 POWDER RESPIRATORY (INHALATION) at 08:41

## 2019-08-13 RX ADMIN — THEOPHYLLINE ANHYDROUS 200 MG: 200 CAPSULE, EXTENDED RELEASE ORAL at 08:41

## 2019-08-13 RX ADMIN — CLOZAPINE 75 MG: 25 TABLET ORAL at 18:05

## 2019-08-13 RX ADMIN — FLUOXETINE 10 MG: 10 CAPSULE ORAL at 08:41

## 2019-08-13 RX ADMIN — LEVOTHYROXINE SODIUM 125 MCG: 125 TABLET ORAL at 06:03

## 2019-08-13 RX ADMIN — PANTOPRAZOLE SODIUM 40 MG: 40 TABLET, DELAYED RELEASE ORAL at 06:03

## 2019-08-13 RX ADMIN — CLOZAPINE 75 MG: 25 TABLET ORAL at 21:38

## 2019-08-13 NOTE — PROGRESS NOTES
Progress Note - Gigi Jenkins 1955, 60 y o  female MRN: [id number]    Unit/Bed#: [facility] 575-31 Encounter: [id number]    Primary Care Provider: Sidney Posey MD   Date and time admitted to hospital: 7/23/2019  5:30 PM        * Schizoaffective disorder, bipolar type [facility])  Assessment & Plan  Psychiatry Progress Note    Subjective: Interval History     Patient is still focused on the need for oxygen with some somatic preoccupation about the same and continues to insist that she cannot breathe or take showers on her own despite staff offering support and assistance and  pulse ox being acceptable most of the time  He is still not attending most of the groups and staying back on her bed and is suspicious about the staff  She has chosen not to to use the portable oxygen to get out of bed and go to groups if necessary  She speaks in a monotonous, stilted and  rambling manner as usual as if talking in a court of law  She complains of feeling tired and some drooling but refuses to add any medications to take care of it when offered but feel splitting the dose was some what helpful  She still appears to harbor some paranoia  She is still points to the lipoma on her and claims that it is a micro chip implanted on and this appears to be a fixed delusion  She still appears to have some paranoia about the staff accusing them of not give giving her the right inhalant and she is again refusing to take the Atrovent claiming that there is "peanut oil in it  She has been sleeping well and has been using the nasal oxygen at night  She is compliant with medications so far but is questioning whether she needs the clozapine at this time  No current suicidal homicidal thoughts intent or plans reported    She is casually dressed fairly groomed but is guarded suspicious evasive been in denial of her illness as usual   No overt hallucinations or well systematized delusions reported other than some underlying paranoid delusions and beliefs that there is a micro chip implanted on her left forearm which has not changed yet   No signs or sxs of agranulocytosis or myocarditis or endocarditis and she is moving her bowels so far with no issues    Focus has been to encourage her to accept her illness as and to cooperate with titrating the clozapine dose but reluctant to increase the dose now but she feels the splitting of the doses yesterday was beneficial  Current medications:    Current Facility-Administered Medications:     acetaminophen (TYLENOL) tablet 650 mg, 650 mg, Oral, Q6H PRN, Dalton Garner MD    acetaminophen (TYLENOL) tablet 650 mg, 650 mg, Oral, Q6H PRN, Dalton Garner MD    acetaminophen (TYLENOL) tablet 650 mg, 650 mg, Oral, Q6H PRN, Dalton Garner MD    albuterol (PROVENTIL HFA,VENTOLIN HFA) inhaler 2 puff, 2 puff, Inhalation, Q4H PRN, Dalton Garner MD, 2 puff at 07/25/19 1200    benzonatate (TESSALON PERLES) capsule 100 mg, 100 mg, Oral, TID PRN, Dalton Garner MD    cloZAPine (CLOZARIL) tablet 75 mg, 75 mg, Oral, BID, Dalton Garner MD, 75 mg at 08/12/19 2142    FLUoxetine (PROzac) capsule 10 mg, 10 mg, Oral, Daily, Dalton Garner MD, 10 mg at 08/13/19 0841    fluticasone-vilanterol (BREO ELLIPTA) 200-25 MCG/INH inhaler 1 puff, 1 puff, Inhalation, Daily, Dalton Garner MD, 1 puff at 08/13/19 0841    levothyroxine tablet 125 mcg, 125 mcg, Oral, Early Morning, Dalton Garner MD, 125 mcg at 08/13/19 0603    OLANZapine (ZyPREXA) tablet 5 mg, 5 mg, Oral, Q8H PRN, Dalton Garner MD    pantoprazole (PROTONIX) EC tablet 40 mg, 40 mg, Oral, Early Morning, Dalton Garner MD, 40 mg at 08/13/19 0603    theophylline (JEF-24) 24 hr capsule 200 mg, 200 mg, Oral, Daily, Dalton Garner MD, 200 mg at 08/13/19 0841    traZODone (DESYREL) tablet 25 mg, 25 mg, Oral, HS PRN, Dalton Garner MD  Justification if on more than two antipsychotics:  Only on his clozapine  Side effects if any:  None    Risks , benefits, side effects and precautions of medications discussed with patient and reminded patient to let us know any problems with medications     Objective:     Vital Signs:  Vitals:    08/12/19 1712 08/12/19 2145 08/13/19 0900 08/13/19 0920   BP:   138/78 111/71   BP Location:   Left arm Left arm   Pulse:   99 104   Resp:   16 16   Temp:   98 2 °F (36 8 °C)    TempSrc:   Temporal    SpO2: 94% 93%     Weight:       Height:         Appearance:  age appropriate, casually dressed and overweight older than stated age   Behavior:  deviant, evasive and guarded and suspicious but with good eye contact   Speech:  normal pitch and normal volume but tends to become loud at times   Mood:  anxious and dysthymic   Affect:  mood-congruent   Thought Process:  goal directed and illogical slightly pressured but able to be redirected   Thought Content:  delusions  grandiose and somatic about not being able to breathe despite being on nasal oxygen and paranoid about people out to harm her and Atrovent inhaler tainteded with peanut oil  No current suicidal homicidal thoughts intent or plans reported  No phobias obsessions or compulsions reported   Perceptual Disturbances: None and does not appear responding to internal stimuli   Risk Potential: Tendency to not care for herself if she goes off treatment   Sensorium:  person, place, time/date, situation, day of week, month of year and time   Cognition:  grossly intact   Consciousness:  alert and awake    Attention: Intact concentration and attention span   Intellect: Considered to be at least of average intelligence   Insight:  limited in denial   Judgment: poor      Motor Activity: no abnormal movements         Recent Labs:  Results Reviewed     None          I/O Past 24 hours:  No intake/output data recorded  No intake/output data recorded          Assessment / Plan:     Schizoaffective disorder, bipolar type [facility])      Reason for continued inpatient care:  Because of underlying paranoia and refusal to go back to the personal care home and inability to care for herself on her own  Acceptance by patient:  Accepting  Quinn Velásquez in recovery:  About living in another personal care home once she feels better  Understanding of medications :  Has some understanding  Involved in reintegration process:  Adjusting to the unit  Trusting in relatoinship with psychiatrist:  Trusting    Recommended Treatment:    Medication changes:  1) none today  Non-pharmacological treatments  1) start individual therapy group therapy, milieu therapy and occupational therapy and milieu therapy involving multidisciplinary team approach with psychotherapist, case management, nursing, peer support services, art therapist etc using recovery principles and psycho-education about accepting illness and need for treatment   Safety  1) Safety/communication plan established targeting dynamic risk factors above  Discharge Plan most likely back to the a:  Personal care boarding home with act services once her delusions are managed and mood becomes more stabilized and she becomes more receptive to treatment compliance    Counseling / Coordination of Care    Total floor / unit time spent today 15 minutes  Greater than 50% of total time was spent with the patient and / or family counseling and / or coordination of care  A description of the counseling / coordination of care  Patient's Rights, confidentiality and exceptions to confidentiality, use of automated medical record, [address] [place] [address] staff access to medical record, and consent to treatment reviewed      Jaz Beasley MD

## 2019-08-13 NOTE — ASSESSMENT & PLAN NOTE
Psychiatry Progress Note    Subjective: Interval History     Patient is still focused on the need for oxygen with some somatic preoccupation about the same and continues to insist that she cannot breathe or take showers on her own despite staff offering support and assistance and  pulse ox being acceptable most of the time  He is still not attending most of the groups and staying back on her bed and is suspicious about the staff  She has chosen not to to use the portable oxygen to get out of bed and go to groups if necessary  She speaks in a monotonous, stilted and  rambling manner as usual as if talking in a court of law  She complains of feeling tired and some drooling but refuses to add any medications to take care of it when offered but feel splitting the dose was some what helpful  She still appears to harbor some paranoia  She is still points to the lipoma on her and claims that it is a micro chip implanted on and this appears to be a fixed delusion  She still appears to have some paranoia about the staff accusing them of not give giving her the right inhalant and she is again refusing to take the Atrovent claiming that there is "peanut oil in it  She has been sleeping well and has been using the nasal oxygen at night  She is compliant with medications so far but is questioning whether she needs the clozapine at this time  No current suicidal homicidal thoughts intent or plans reported  She is casually dressed fairly groomed but is guarded suspicious evasive been in denial of her illness as usual   No overt hallucinations or well systematized delusions reported other than some underlying paranoid delusions and beliefs that there is a micro chip implanted on her left forearm which has not changed yet   No signs or sxs of agranulocytosis or myocarditis or endocarditis and she is moving her bowels so far with no issues    Focus has been to encourage her to accept her illness as and to cooperate with titrating the clozapine dose but reluctant to increase the dose now but she feels the splitting of the doses yesterday was beneficial  Current medications:    Current Facility-Administered Medications:     acetaminophen (TYLENOL) tablet 650 mg, 650 mg, Oral, Q6H PRN, Ivonne Nevarez MD    acetaminophen (TYLENOL) tablet 650 mg, 650 mg, Oral, Q6H PRN, Ivonne Nevarez MD    acetaminophen (TYLENOL) tablet 650 mg, 650 mg, Oral, Q6H PRN, Ivonne Nevarez MD    albuterol (PROVENTIL HFA,VENTOLIN HFA) inhaler 2 puff, 2 puff, Inhalation, Q4H PRN, Ivonne Nevarez MD, 2 puff at 07/25/19 1200    benzonatate (TESSALON PERLES) capsule 100 mg, 100 mg, Oral, TID PRN, Ivonne Nevarez MD    cloZAPine (CLOZARIL) tablet 75 mg, 75 mg, Oral, BID, Ivonne Nevarez MD, 75 mg at 08/12/19 2142    FLUoxetine (PROzac) capsule 10 mg, 10 mg, Oral, Daily, Ivonne Nevarez MD, 10 mg at 08/13/19 0841    fluticasone-vilanterol (BREO ELLIPTA) 200-25 MCG/INH inhaler 1 puff, 1 puff, Inhalation, Daily, Ivonne Nevarez MD, 1 puff at 08/13/19 0841    levothyroxine tablet 125 mcg, 125 mcg, Oral, Early Morning, Ivonne Nevarez MD, 125 mcg at 08/13/19 0603    OLANZapine (ZyPREXA) tablet 5 mg, 5 mg, Oral, Q8H PRN, Ivonne Nevarez MD    pantoprazole (PROTONIX) EC tablet 40 mg, 40 mg, Oral, Early Morning, Ivonne Nevarez MD, 40 mg at 08/13/19 0603    theophylline (JEF-24) 24 hr capsule 200 mg, 200 mg, Oral, Daily, Ivonne Nevarez MD, 200 mg at 08/13/19 0841    traZODone (DESYREL) tablet 25 mg, 25 mg, Oral, HS PRN, Ivonne Nevarez MD  Justification if on more than two antipsychotics:  Only on his clozapine  Side effects if any:  None    Risks , benefits, side effects and precautions of medications discussed with patient and reminded patient to let us know any problems with medications     Objective:     Vital Signs:  Vitals:    08/12/19 1712 08/12/19 2145 08/13/19 0900 08/13/19 0920   BP:   138/78 111/71   BP Location:   Left arm Left arm   Pulse:   99 104   Resp:   16 16   Temp:   98 2 °F (36 8 °C)    TempSrc:   Temporal    SpO2: 94% 93%     Weight:       Height:         Appearance:  age appropriate, casually dressed and overweight older than stated age   Behavior:  deviant, evasive and guarded and suspicious but with good eye contact   Speech:  normal pitch and normal volume but tends to become loud at times   Mood:  anxious and dysthymic   Affect:  mood-congruent   Thought Process:  goal directed and illogical slightly pressured but able to be redirected   Thought Content:  delusions  grandiose and somatic about not being able to breathe despite being on nasal oxygen and paranoid about people out to harm her and Atrovent inhaler tainteded with peanut oil  No current suicidal homicidal thoughts intent or plans reported  No phobias obsessions or compulsions reported   Perceptual Disturbances: None and does not appear responding to internal stimuli   Risk Potential: Tendency to not care for herself if she goes off treatment   Sensorium:  person, place, time/date, situation, day of week, month of year and time   Cognition:  grossly intact   Consciousness:  alert and awake    Attention: Intact concentration and attention span   Intellect: Considered to be at least of average intelligence   Insight:  limited in denial   Judgment: poor      Motor Activity: no abnormal movements         Recent Labs:  Results Reviewed     None          I/O Past 24 hours:  No intake/output data recorded  No intake/output data recorded          Assessment / Plan:     [facility]ffective [facility]      Reason for continued inpatient care:  Because of underlying paranoia and refusal to go back to the personal care home and inability to care for herself on her own  Acceptance by patient:  Accepting  Jabari Marie in recovery:  About living in another personal care home once she feels better  Understanding of medications :  Has some understanding  Involved in reintegration process:  Adjusting to the unit  Trusting in relatoinship with psychiatrist:  Trusting    Recommended Treatment:    Medication changes:  1) none today  Non-pharmacological treatments  1) start individual therapy group therapy, milieu therapy and occupational therapy and milieu therapy involving multidisciplinary team approach with psychotherapist, case management, nursing, peer support services, art therapist etc using recovery principles and psycho-education about accepting illness and need for treatment   Safety  1) Safety/communication plan established targeting dynamic risk factors above  Discharge Plan most likely back to the a:  Personal care boarding home with act services once her delusions are managed and mood becomes more stabilized and she becomes more receptive to treatment compliance    Counseling / Coordination of Care    Total floor / unit time spent today 15 minutes  Greater than 50% of total time was spent with the patient and / or family counseling and / or coordination of care  A description of the counseling / coordination of care  Patient's Rights, confidentiality and exceptions to confidentiality, use of automated medical record, [facility] to [facility] record, and consent to treatment reviewed      Christian Bennett MD

## 2019-08-13 NOTE — PLAN OF CARE
Problem: Alteration in Thoughts and Perception  Goal: Verbalize thoughts and feelings  Description  Interventions:  - Promote a nonjudgmental and trusting relationship with the patient through active listening and therapeutic communication  - Assess patient's level of functioning, behavior and potential for risk  - Engage patient in 1 on 1 interactions for a minimum of 15 minutes each session  - Encourage patient to express fears, feelings, frustrations, and discuss symptoms    - [place] patient to reality, help patient recognize reality-based thinking   - Administer medications as ordered and assess for potential side effects  - Provide the patient education related to the signs and symptoms of the illness and desired effects of prescribed medications  Outcome: Progressing  Goal: Agree to be compliant with medication regime, as prescribed and report medication side effects  Description  Interventions:  - Offer appropriate PRN medication and supervise ingestion; conduct aims, as needed   Outcome: Progressing     Problem: RESPIRATORY - ADULT  Goal: Achieves optimal ventilation and oxygenation  Description  INTERVENTIONS:  - Assess for changes in respiratory status  - Assess for changes in mentation and behavior  - Position to facilitate oxygenation and minimize respiratory effort  - Oxygen administration by appropriate delivery method based on oxygen saturation (per order) or ABGs  - Initiate smoking cessation education as indicated  - Encourage broncho-pulmonary hygiene including cough, deep breathe, Incentive Spirometry  - Assess the need for suctioning and aspirate as needed  - Assess and instruct to report SOB or any respiratory difficulty  - Respiratory Therapy support as indicated  Outcome: Progressing     Problem: Alteration in Thoughts and Perception  Goal: Attend and participate in unit activities, including therapeutic, recreational, and educational groups  Description  Interventions:  - Provide therapeutic and educational activities daily, encourage attendance and participation, and document same in the medical record   Outcome: Not Progressing     Problem: Depression  Goal: Refrain from isolation  Description  Interventions:  - Develop a trusting relationship   - Encourage socialization   Outcome: Not Progressing     [address]ang has been OOB in room seated in chair @ intervals  Did leave her room to come to dining room for meal (ate 100%), to come to window for scheduled medicines, to have HS snack  Pleasant, but, is overall isolative, unwilling to follow through w/attempting short walks between her room & dining room for exercise, to build stamina  Continues preoccupied w/her breathing & perceives self to be in distress when isn't (PO2 @ 1710 was 94% which reassured her some)  Did not attend PM Group  Wearing her nasal O2 @ 1L now for HS via cannula/compressor setup after room air O2 sat of 93% taken prior

## 2019-08-13 NOTE — PROGRESS NOTES
08/13/19 0900   Team Meeting   Meeting Type Daily Rounds   Team Members Present   Team Members Present Nurse;; Other (Discipline and Name)   Nursing Team Member Samuel Velazquez RN   Care Management Team Member Brenda Pagan   Other (Discipline and Name) Anita Patel [place]; SHANIKA Reece     Patient attended treatment team only yesterday  Isolative to her room  Worrisome about her oxygen still  Slept

## 2019-08-13 NOTE — PROGRESS NOTES
Sleeping at this time, no distress noted  O2 on, no s/s of resp distress   Precautions  Maintained and monitoring continues

## 2019-08-13 NOTE — PLAN OF CARE
Problem: Alteration in Thoughts and Perception  Goal: Agree to be compliant with medication regime, as prescribed and report medication side effects  Description  Interventions:  - Offer appropriate PRN medication and supervise ingestion; conduct aims, as needed   Outcome: Progressing     Problem: Alteration in Thoughts and Perception  Goal: Attend and participate in unit activities, including therapeutic, recreational, and educational groups  Description  Interventions:  - Provide therapeutic and educational activities daily, encourage attendance and participation, and document same in the medical record   Outcome: Not Progressing  Goal: Recognize dysfunctional thoughts, communicate reality-based thoughts at the time of discharge  Description  Interventions:  - Provide medication and psycho-education to assist patient in compliance and developing insight into his/her illness   Outcome: Not Progressing   Patient is pleasant and cooperative this morning  She is mostly Isolative to her room but does come out for medications and meals when prompted  She sleeps most of the day  No programming attended this shift  She continues to have poor insight into her mental illness  She states she does not believe that she has a mental illness and thinks she only needs to be here for her physical illness which is the COPD  Currently her COPD is stable  This writer encouraged her to get OOB and move around however, she stated Julito King but stayed in bed anyway and only got up to have lunch and retreated right back to her room  Will continue to encourage the patient to take an active role in her recovery

## 2019-08-14 LAB — GLUCOSE SERPL-MCNC: 100 MG/DL (ref 65–140)

## 2019-08-14 PROCEDURE — 82948 REAGENT STRIP/BLOOD GLUCOSE: CPT

## 2019-08-14 PROCEDURE — 99232 SBSQ HOSP IP/OBS MODERATE 35: CPT | Performed by: PSYCHIATRY & NEUROLOGY

## 2019-08-14 RX ADMIN — CLOZAPINE 75 MG: 25 TABLET ORAL at 21:51

## 2019-08-14 RX ADMIN — FLUTICASONE FUROATE AND VILANTEROL TRIFENATATE 1 PUFF: 200; 25 POWDER RESPIRATORY (INHALATION) at 08:40

## 2019-08-14 RX ADMIN — LEVOTHYROXINE SODIUM 125 MCG: 125 TABLET ORAL at 06:22

## 2019-08-14 RX ADMIN — PANTOPRAZOLE SODIUM 40 MG: 40 TABLET, DELAYED RELEASE ORAL at 06:22

## 2019-08-14 RX ADMIN — THEOPHYLLINE ANHYDROUS 200 MG: 200 CAPSULE, EXTENDED RELEASE ORAL at 08:39

## 2019-08-14 RX ADMIN — FLUOXETINE 10 MG: 10 CAPSULE ORAL at 08:40

## 2019-08-14 RX ADMIN — CLOZAPINE 75 MG: 25 TABLET ORAL at 17:54

## 2019-08-14 NOTE — QUICK NOTE
Pt was scheduled to attend [facility] workshop to have assistance from [address] and peers on unit  This writer prompted Pt to attend however, Pt told writer she was "not feeling up to it " Group was held from [id number] and Pt then showed up at [phone] asking for help  Writer ask Pt if she can attempt to fill out parts of the WRAP and we will get together at a later date to complete the document

## 2019-08-14 NOTE — ASSESSMENT & PLAN NOTE
Psychiatry Progress Note    Subjective: Interval History     Patient has not been attending to many groups and is still focused and preoccupied about her breathing difficulty despite the fact that she is breathing fine  Today she complains about flu like symptoms but her vital signs have been fine  She believes that these switch in the dosage timing of med it the medication Clozaril has helped but still complains about occasional drooling  She has chosen not to to use the portable oxygen to get out of bed and go to groups if necessary  She speaks in a monotonous, stilted and rambling manner as usual as if talking in a court of law  She still appears to harbor some paranoia off and on  She is still points to the lipoma on her and claims that it is a micro chip implanted on and this appears to be a fixed delusion  She still accuses the staff of not giving her the right inhalant and she is again refusing to take the Atrovent claiming that there is "peanut oil in it  She has been sleeping well and has been using the nasal oxygen at night  She is compliant with medications so far but is questioning whether she needs the clozapine at this time and is willing to stay on it any for the time being  No current suicidal homicidal thoughts intent or plans reported  She is casually dressed fairly groomed but is guarded suspicious evasive been in denial of her illness as usual   No overt hallucinations or well systematized delusions reported other than some underlying paranoid delusions and beliefs that there is a micro chip implanted on her left forearm which has not changed yet   No signs or sxs of agranulocytosis or myocarditis or endocarditis and she is moving her bowels so far with no issues    Staff has been encouraging to accept her medications and attend more groups as scheduled   Current medications:    Current Facility-Administered Medications:     acetaminophen (TYLENOL) tablet 650 mg, 650 mg, Oral, Q6H PRN, Shilpa Trujillo MD    acetaminophen (TYLENOL) tablet 650 mg, 650 mg, Oral, Q6H PRN, Shilpa Trujillo MD    acetaminophen (TYLENOL) tablet 650 mg, 650 mg, Oral, Q6H PRN, Shilpa Trujillo MD    albuterol (PROVENTIL HFA,VENTOLIN HFA) inhaler 2 puff, 2 puff, Inhalation, Q4H PRN, Shilpa Trujillo MD, 2 puff at 07/25/19 1200    benzonatate (TESSALON PERLES) capsule 100 mg, 100 mg, Oral, TID PRN, Shilpa Trujillo MD    cloZAPine (CLOZARIL) tablet 75 mg, 75 mg, Oral, BID, Shilpa Trujillo MD, 75 mg at 08/13/19 2138    FLUoxetine (PROzac) capsule 10 mg, 10 mg, Oral, Daily, Shilpa Trujillo MD, 10 mg at 08/14/19 0840    fluticasone-vilanterol (BREO ELLIPTA) 200-25 MCG/INH inhaler 1 puff, 1 puff, Inhalation, Daily, Shilpa Trujillo MD, 1 puff at 08/14/19 0840    levothyroxine tablet 125 mcg, 125 mcg, Oral, Early Morning, Shilpa Trujillo MD, 125 mcg at 08/14/19 0622    OLANZapine (ZyPREXA) tablet 5 mg, 5 mg, Oral, Q8H PRN, Shilpa Trujillo MD    pantoprazole (PROTONIX) EC tablet 40 mg, 40 mg, Oral, Early Morning, Shilpa Trujillo MD, 40 mg at 08/14/19 0622    theophylline (JEF-24) 24 hr capsule 200 mg, 200 mg, Oral, Daily, Shilpa Trujillo MD, 200 mg at 08/14/19 7426    traZODone (DESYREL) tablet 25 mg, 25 mg, Oral, HS PRN, Shilpa Trujillo MD  Justification if on more than two antipsychotics:  Only on his clozapine  Side effects if any:  None    Risks , benefits, side effects and precautions of medications discussed with patient and reminded patient to let us know any problems with medications     Objective:     Vital Signs:  Vitals:    08/13/19 0920 08/13/19 2145 08/14/19 0730 08/14/19 0732   BP: 111/71  115/68 117/67   BP Location: Left arm  Left arm Left arm   Pulse: 104  89 94   Resp: 16  16    Temp:   98 1 °F (36 7 °C)    TempSrc:   Temporal    SpO2:  92%     Weight:       Height:         Appearance:  age appropriate, casually dressed and overweight older than stated age   Behavior:  deviant, evasive and guarded and suspicious but with good eye contact Speech:  normal pitch and normal volume but tends to become loud at times   Mood:  anxious and dysthymic   Affect:  mood-congruent   Thought Process:  goal directed and illogical slightly pressured but able to be redirected   Thought Content:  delusions  grandiose and somatic about not being able to breathe despite being on nasal oxygen and paranoid about people out to harm her and Atrovent inhaler tainteded with peanut oil  No current suicidal homicidal thoughts intent or plans reported  No phobias obsessions or compulsions reported   Perceptual Disturbances: None and does not appear responding to internal stimuli   Risk Potential: Tendency to not care for herself if she goes off treatment   Sensorium:  person, place, time/date, situation, day of week, month of year and time   Cognition:  grossly intact   Consciousness:  alert and awake    Attention: Intact concentration and attention span   Intellect: Considered to be at least of average intelligence   Insight:  limited in denial   Judgment: poor      Motor Activity: no abnormal movements         Recent Labs:  Results Reviewed     None          I/O Past 24 hours:  No intake/output data recorded  No intake/output data recorded          Assessment / Plan:     Schizoaffective disorder, bipolar type ([facility] )      Reason for continued inpatient care:  Because of underlying paranoia and refusal to go back to the personal care home and inability to care for herself on her own  Acceptance by patient:  Accepting  Claude Ground in recovery:  About living in another personal care home once she feels better  Understanding of medications :  Has some understanding  Involved in reintegration process:  Adjusting to the unit  Trusting in relatoinship with psychiatrist:  Trusting    Recommended Treatment:    Medication changes:  1) none today  Non-pharmacological treatments  1) start individual therapy group therapy, milieu therapy and occupational therapy and milieu therapy involving multidisciplinary team approach with psychotherapist, case management, nursing, peer support services, art therapist etc using recovery principles and psycho-education about accepting illness and need for treatment   Safety  1) Safety/communication plan established targeting dynamic risk factors above  Discharge Plan most likely back to the a:  Personal care boarding home with act services once her delusions are managed and mood becomes more stabilized and she becomes more receptive to treatment compliance    Counseling / Coordination of Care    Total floor / unit time spent today 15 minutes  Greater than 50% of total time was spent with the patient and / or family counseling and / or coordination of care  A description of the counseling / coordination of care  Patient's Rights, confidentiality and exceptions to confidentiality, use of automated medical record, [address] [place] [address] staff access to medical record, and consent to treatment reviewed      Amina Aparicio MD

## 2019-08-14 NOTE — QUICK NOTE
Pt attended [address] group in which we discussed Fears  Pt relentlessly focused only on her fear of not having her oxygen and falling in the shower  Writer attempted to redirect her however, without success  Writer ask Pt to focus on other things that brought her to the hospital as in her Mental Health in therapeutic groups as the medical staff will and have been meeting her physical needs  Writer will speak to Pt about the 'recovery model" of the EAC in private to address her concerns

## 2019-08-14 NOTE — PLAN OF CARE
Problem: Alteration in Thoughts and Perception  Goal: Agree to be compliant with medication regime, as prescribed and report medication side effects  Description  Interventions:  - Offer appropriate PRN medication and supervise ingestion; conduct aims, as needed   Outcome: Progressing     Problem: Alteration in Thoughts and Perception  Goal: Attend and participate in unit activities, including therapeutic, recreational, and educational groups  Description  Interventions:  - Provide therapeutic and educational activities daily, encourage attendance and participation, and document same in the medical record   Outcome: Not Progressing  Goal: Complete daily ADLs, including personal hygiene independently, as able  Description  Interventions:  - Observe, teach, and assist patient with ADLS  - Monitor and promote a balance of rest/activity, with adequate nutrition and elimination   Outcome: Not Progressing     Problem: Depression  Goal: Refrain from isolation  Description  Interventions:  - Develop a trusting relationship   - Encourage socialization   Outcome: Not Progressing     [address] credits herself sitting out in chair in room X 30' prior to meal, sitting upright in bed after eating for digestion & to aid in breathing when full  She was again encouraged to do short laps to dining room, rest, to room, rest, repeat, 3-5 X's on 3-11 shift to build endurance, strength  She agrees to start this "tomorrow"  She does continue to fixate on her breathing, whether has "tightness" in lungs  Curious about lung function, percentage she has remaining & would like Medical doctors to make this assessment  She did leave room to eat meal (ate 100%), to get scheduled medicines, to have HS snack  Did not attend PM Group  Did not attend to any hygiene  Wearing her QHS nasal O2 @ 1L after PO2 sat of 92% on room air

## 2019-08-14 NOTE — PROGRESS NOTES
Progress Note - Dillon Butt 1955, 60 y o  female MRN: [id number]    [facility] Encounter: [id number]    Primary Care Provider: Alyssa Sanon MD   Date and time admitted to hospital: 7/23/2019  5:30 PM        * Schizoaffective disorder, bipolar type [facility])  Assessment & Plan  Psychiatry Progress Note    Subjective: Interval History     Patient has not been attending to many groups and is still focused and preoccupied about her breathing difficulty despite the fact that she is breathing fine  Today she complains about flu like symptoms but her vital signs have been fine  She believes that these switch in the dosage timing of med it the medication Clozaril has helped but still complains about occasional drooling  She has chosen not to to use the portable oxygen to get out of bed and go to groups if necessary  She speaks in a monotonous, stilted and rambling manner as usual as if talking in a court of law  She still appears to harbor some paranoia off and on  She is still points to the lipoma on her and claims that it is a micro chip implanted on and this appears to be a fixed delusion  She still accuses the staff of not giving her the right inhalant and she is again refusing to take the Atrovent claiming that there is "peanut oil in it  She has been sleeping well and has been using the nasal oxygen at night  She is compliant with medications so far but is questioning whether she needs the clozapine at this time and is willing to stay on it any for the time being  No current suicidal homicidal thoughts intent or plans reported  She is casually dressed fairly groomed but is guarded suspicious evasive been in denial of her illness as usual   No overt hallucinations or well systematized delusions reported other than some underlying paranoid delusions and beliefs that there is a micro chip implanted on her left forearm which has not changed yet     No signs or sxs of agranulocytosis or myocarditis or endocarditis and she is moving her bowels so far with no issues    Staff has been encouraging to accept her medications and attend more groups as scheduled   Current medications:    Current Facility-Administered Medications:     acetaminophen (TYLENOL) tablet 650 mg, 650 mg, Oral, Q6H PRN, Mynor Terry MD    acetaminophen (TYLENOL) tablet 650 mg, 650 mg, Oral, Q6H PRN, Mynor Terry MD    acetaminophen (TYLENOL) tablet 650 mg, 650 mg, Oral, Q6H PRN, Mynor Terry MD    albuterol (PROVENTIL HFA,VENTOLIN HFA) inhaler 2 puff, 2 puff, Inhalation, Q4H PRN, Mynor Terry MD, 2 puff at 07/25/19 1200    benzonatate (TESSALON PERLES) capsule 100 mg, 100 mg, Oral, TID PRN, Mynor Terry MD    cloZAPine (CLOZARIL) tablet 75 mg, 75 mg, Oral, BID, Mynor Terry MD, 75 mg at 08/13/19 2138    FLUoxetine (PROzac) capsule 10 mg, 10 mg, Oral, Daily, Mynor Terry MD, 10 mg at 08/14/19 0840    fluticasone-vilanterol (BREO ELLIPTA) 200-25 MCG/INH inhaler 1 puff, 1 puff, Inhalation, Daily, Mynor Teryr MD, 1 puff at 08/14/19 0840    levothyroxine tablet 125 mcg, 125 mcg, Oral, Early Morning, Mynor Terry MD, 125 mcg at 08/14/19 0622    OLANZapine (ZyPREXA) tablet 5 mg, 5 mg, Oral, Q8H PRN, Mynor Terry MD    pantoprazole (PROTONIX) EC tablet 40 mg, 40 mg, Oral, Early Morning, Mynor Terry MD, 40 mg at 08/14/19 0622    theophylline (JEF-24) 24 hr capsule 200 mg, 200 mg, Oral, Daily, Mynor Terry MD, 200 mg at 08/14/19 9533    traZODone (DESYREL) tablet 25 mg, 25 mg, Oral, HS PRN, Mynor Terry MD  Justification if on more than two antipsychotics:  Only on his clozapine  Side effects if any:  None    Risks , benefits, side effects and precautions of medications discussed with patient and reminded patient to let us know any problems with medications     Objective:     Vital Signs:  Vitals:    08/13/19 0920 08/13/19 2145 08/14/19 0730 08/14/19 0732   BP: 111/71  115/68 117/67   BP Location: Left arm  Left arm Left arm Pulse: 104  89 94   Resp: 16  16    Temp:   98 1 °F (36 7 °C)    TempSrc:   Temporal    SpO2:  92%     Weight:       Height:         Appearance:  age appropriate, casually dressed and overweight older than stated age   Behavior:  deviant, evasive and guarded and suspicious but with good eye contact   Speech:  normal pitch and normal volume but tends to become loud at times   Mood:  anxious and dysthymic   Affect:  mood-congruent   Thought Process:  goal directed and illogical slightly pressured but able to be redirected   Thought Content:  delusions  grandiose and somatic about not being able to breathe despite being on nasal oxygen and paranoid about people out to harm her and Atrovent inhaler tainteded with peanut oil  No current suicidal homicidal thoughts intent or plans reported  No phobias obsessions or compulsions reported   Perceptual Disturbances: None and does not appear responding to internal stimuli   Risk Potential: Tendency to not care for herself if she goes off treatment   Sensorium:  person, place, time/date, situation, day of week, month of year and time   Cognition:  grossly intact   Consciousness:  alert and awake    Attention: Intact concentration and attention span   Intellect: Considered to be at least of average intelligence   Insight:  limited in denial   Judgment: poor      Motor Activity: no abnormal movements         Recent Labs:  Results Reviewed     None          I/O Past 24 hours:  No intake/output data recorded  No intake/output data recorded          Assessment / Plan:     Schizoaffective disorder, bipolar type ([facility] )      Reason for continued inpatient care:  Because of underlying paranoia and refusal to go back to the personal care home and inability to care for herself on her own  Acceptance by patient:  Accepting  Cla[place] in recovery:  About living in another personal care home once she feels better  Understanding of medications :  Has some understanding  Involved in reintegration process:  Adjusting to the unit  Trusting in relatoinship with psychiatrist:  Trusting    Recommended Treatment:    Medication changes:  1) none today  Non-pharmacological treatments  1) start individual therapy group therapy, milieu therapy and occupational therapy and milieu therapy involving multidisciplinary team approach with psychotherapist, case management, nursing, peer support services, art therapist etc using recovery principles and psycho-education about accepting illness and need for treatment   Safety  1) Safety/communication plan established targeting dynamic risk factors above  Discharge Plan most likely back to the a:  Personal care boarding home with act services once her delusions are managed and mood becomes more stabilized and she becomes more receptive to treatment compliance    Counseling / Coordination of Care    Total floor / unit time spent today 15 minutes  Greater than 50% of total time was spent with the patient and / or family counseling and / or coordination of care  A description of the counseling / coordination of care  Patient's Rights, confidentiality and exceptions to confidentiality, use of automated medical record, [facility] staff access to medical record, and consent to treatment reviewed      Allie Guzman MD

## 2019-08-14 NOTE — PLAN OF CARE
Problem: Alteration in Thoughts and Perception  Goal: Verbalize thoughts and feelings  Description  Interventions:  - Promote a nonjudgmental and trusting relationship with the patient through active listening and therapeutic communication  - Assess patient's level of functioning, behavior and potential for risk  - Engage patient in 1 on 1 interactions for a minimum of 15 minutes each session  - Encourage patient to express fears, feelings, frustrations, and discuss symptoms    - [place] patient to reality, help patient recognize reality-based thinking   - Administer medications as ordered and assess for potential side effects  - Provide the patient education related to the signs and symptoms of the illness and desired effects of prescribed medications  Outcome: Progressing  Goal: Agree to be compliant with medication regime, as prescribed and report medication side effects  Description  Interventions:  - Offer appropriate PRN medication and supervise ingestion; conduct aims, as needed   Outcome: Progressing   Patient is pleasant and cooperative this morning  She is mostly Isolative to her room but does come out for medications and meals when prompted  She sleeps most of the day  No somatic complaints noted this shift  Continue to monitor

## 2019-08-14 NOTE — PROGRESS NOTES
08/14/19 0800   Team Meeting   Meeting Type Daily Rounds   Team Members Present   Team Members Present Physician;Nurse;; Other (Discipline and Name)   Physician Team Member Dr Abad Julio Team Member Priyank Perez RN   Care Management Team Member Brenda Posadas   Other (Discipline and Name) DIANA Leblanc     Patient not attending groups  Somatic and preoccupied  Not listening to staff direction  Slept

## 2019-08-14 NOTE — PLAN OF CARE
Problem: PAIN - ADULT  Goal: Verbalizes/displays adequate comfort level or baseline comfort level  Description  Interventions:  - Encourage patient to monitor pain and request assistance  - Assess pain using appropriate pain scale  - Administer analgesics based on type and severity of pain and evaluate response  - Implement non-pharmacological measures as appropriate and evaluate response  - Consider cultural and social influences on pain and pain management  - Notify physician/advanced practitioner if interventions unsuccessful or patient reports new pain  Outcome: Progressing     Problem: SAFETY ADULT  Goal: Patient will remain free of falls  Description  INTERVENTIONS:  - Assess patient frequently for physical needs  -  Identify cognitive and physical deficits and behaviors that affect risk of falls  -  [place] fall precautions as indicated by assessment   - Educate patient/family on patient safety including physical limitations  - Instruct patient to call for assistance with activity based on assessment  - Modify environment to reduce risk of injury  - Consider OT/PT consult to assist with strengthening/mobility  Outcome: Cherelle Isabel maintained on ongoing fall and SAFE precaution without incident on this shift  Laying in bed with eyes closed, breath even and unlabored with O2@ 1L/M via nasal cannula  No sign or symptom of respiratory distress  Cashjames Holland will continue to remain free from fall  Denies any pain or discomfort at this moment  Will continue to monitor behavior, pain and sleep pattern

## 2019-08-15 LAB
ATRIAL RATE: 93 BPM
P AXIS: 74 DEGREES
PR INTERVAL: 156 MS
QRS AXIS: -65 DEGREES
QRSD INTERVAL: 88 MS
QT INTERVAL: 376 MS
QTC INTERVAL: 467 MS
T WAVE AXIS: 55 DEGREES
VENTRICULAR RATE: 93 BPM

## 2019-08-15 PROCEDURE — 93010 ELECTROCARDIOGRAM REPORT: CPT | Performed by: INTERNAL MEDICINE

## 2019-08-15 PROCEDURE — 99232 SBSQ HOSP IP/OBS MODERATE 35: CPT | Performed by: PSYCHIATRY & NEUROLOGY

## 2019-08-15 RX ADMIN — CLOZAPINE 75 MG: 25 TABLET ORAL at 17:00

## 2019-08-15 RX ADMIN — THEOPHYLLINE ANHYDROUS 200 MG: 200 CAPSULE, EXTENDED RELEASE ORAL at 08:32

## 2019-08-15 RX ADMIN — FLUOXETINE 10 MG: 10 CAPSULE ORAL at 08:32

## 2019-08-15 RX ADMIN — PANTOPRAZOLE SODIUM 40 MG: 40 TABLET, DELAYED RELEASE ORAL at 05:54

## 2019-08-15 RX ADMIN — LEVOTHYROXINE SODIUM 125 MCG: 125 TABLET ORAL at 05:54

## 2019-08-15 RX ADMIN — CLOZAPINE 75 MG: 25 TABLET ORAL at 21:25

## 2019-08-15 RX ADMIN — FLUTICASONE FUROATE AND VILANTEROL TRIFENATATE 1 PUFF: 200; 25 POWDER RESPIRATORY (INHALATION) at 08:32

## 2019-08-15 NOTE — PROGRESS NOTES
08/15/19 0800   Team Meeting   Meeting Type Daily Rounds   Team Members Present   Team Members Present Nurse;; Other (Discipline and Name)   Nursing Team Member Roverto Gonzalez RN   Care Management Team Member Brenda Johnson   Other (Discipline and Name) Hollie Sharpe, [place]; SHANIKA Mason     Patient attended [address] yesterday  In bed and somatic most of the day  Slept

## 2019-08-15 NOTE — ASSESSMENT & PLAN NOTE
Psychiatry Progress Note    Subjective: Interval History     Patient has been attending only sporadic groups and is still focused and preoccupied about her breathing difficulty despite the fact that she is breathing fine and her pulse ox has been acceptable  Her vital signs have been stable  She believes that these switch in the dosage timing of med it the medication Clozaril has helped but still complains about occasional drooling which has lessened with the splitting of the dosage of Clozaril  She has chosen not to to use the portable oxygen to get out of bed and go to groups if necessary  She continues to state in a monotonous, stilted and rambling manner as usual as if talking in a court of law  She still appears to harbor some covert as well as overt paranoia off and on  She is still points to the lipoma on her and claims that it is a micro chip implanted on and this appears to be a fixed delusion  She still accuses the staff of not giving her the right inhalant and still refuses to take the Atrovent inhaler claiming that there is "peanut oil in it  She has been sleeping well and has been using the nasal oxygen at night  She is compliant with medications so far but is questioning whether she needs the clozapine at this time and is willing to stay on it any for the time being  No current suicidal homicidal thoughts intent or plans reported  She is casually dressed fairly groomed but is guarded suspicious evasive been in denial of her illness as usual   No overt hallucinations or other delusions reported other than some underlying paranoid delusions and beliefs that there is a micro chip implanted on her left forearm   No signs or sxs of agranulocytosis or myocarditis or endocarditis and she is moving her bowels so far with no issues    Staff has been encouraging to accept her medications and attend more groups as scheduled and work on her recovery   Current medications:    Current Facility-Administered Medications:     acetaminophen (TYLENOL) tablet 650 mg, 650 mg, Oral, Q6H PRN, Prakash Brewster MD    acetaminophen (TYLENOL) tablet 650 mg, 650 mg, Oral, Q6H PRN, Prakash Brewster MD    acetaminophen (TYLENOL) tablet 650 mg, 650 mg, Oral, Q6H PRN, Prakash Brewster MD    albuterol (PROVENTIL HFA,VENTOLIN HFA) inhaler 2 puff, 2 puff, Inhalation, Q4H PRN, Prakash Brewster MD, 2 puff at 07/25/19 1200    benzonatate (TESSALON PERLES) capsule 100 mg, 100 mg, Oral, TID PRN, Prakash Brewster MD    cloZAPine (CLOZARIL) tablet 75 mg, 75 mg, Oral, BID, Prakash Brewster MD, 75 mg at 08/14/19 2151    FLUoxetine (PROzac) capsule 10 mg, 10 mg, Oral, Daily, Prakash Brewster MD, 10 mg at 08/14/19 0840    fluticasone-vilanterol (BREO ELLIPTA) 200-25 MCG/INH inhaler 1 puff, 1 puff, Inhalation, Daily, Prakash Brewster MD, 1 puff at 08/14/19 0840    levothyroxine tablet 125 mcg, 125 mcg, Oral, Early Morning, Prakash Brewster MD, 125 mcg at 08/15/19 0554    OLANZapine (ZyPREXA) tablet 5 mg, 5 mg, Oral, Q8H PRN, Prakash Brewster MD    pantoprazole (PROTONIX) EC tablet 40 mg, 40 mg, Oral, Early Morning, Prakash Brewster MD, 40 mg at 08/15/19 0554    theophylline (JEF-24) 24 hr capsule 200 mg, 200 mg, Oral, Daily, Prakash Brewster MD, 200 mg at 08/14/19 9610    traZODone (DESYREL) tablet 25 mg, 25 mg, Oral, HS PRN, Prakash Brewster MD  Justification if on more than two antipsychotics:  Only on his clozapine  Side effects if any:  None    Risks , benefits, side effects and precautions of medications discussed with patient and reminded patient to let us know any problems with medications     Objective:     Vital Signs:  Vitals:    08/13/19 2145 08/14/19 0730 08/14/19 0732 08/14/19 2150   BP:  115/68 117/67    BP Location:  Left arm Left arm    Pulse:  89 94    Resp:  16     Temp:  98 1 °F (36 7 °C)     TempSrc:  Temporal     SpO2: 92%   93%   Weight:       Height:         Appearance:  age appropriate, casually dressed and overweight older than stated age Behavior:  deviant, evasive and guarded and suspicious but with good eye contact   Speech:  normal pitch and normal volume but tends to become loud at times   Mood:  anxious and dysthymic   Affect:  mood-congruent   Thought Process:  goal directed and illogical slightly pressured but able to be redirected   Thought Content:  delusions  grandiose and somatic about not being able to breathe despite being on nasal oxygen and paranoid about people out to harm her and Atrovent inhaler tainteded with peanut oil  No current suicidal homicidal thoughts intent or plans reported  No phobias obsessions or compulsions reported   Perceptual Disturbances: None and does not appear responding to internal stimuli   Risk Potential: Tendency to not care for herself if she goes off treatment   Sensorium:  person, place, time/date, situation, day of week, month of year and time   Cognition:  grossly intact   Consciousness:  alert and awake    Attention: Intact concentration and attention span   Intellect: Considered to be at least of average intelligence   Insight:  limited in denial   Judgment: poor      Motor Activity: no abnormal movements         Recent Labs:  Results Reviewed     None          I/O Past 24 hours:  No intake/output data recorded  No intake/output data recorded          Assessment / Plan:     [facility]ffective [facility]      Reason for continued inpatient care:  Because of underlying paranoia and refusal to go back to the personal care home and inability to care for herself on her own  Acceptance by patient:  Accepting  Rene Peacock in recovery:  About living in another personal care home once she feels better  Understanding of medications :  Has some understanding  Involved in reintegration process:  Adjusting to the unit  Trusting in relatoinship with psychiatrist:  Trusting    Recommended Treatment:    Medication changes:  1) none today  Non-pharmacological treatments  1) start individual therapy group therapy, milieu therapy and occupational therapy and milieu therapy involving multidisciplinary team approach with psychotherapist, case management, nursing, peer support services, art therapist etc using recovery principles and psycho-education about accepting illness and need for treatment   Safety  1) Safety/communication plan established targeting dynamic risk factors above  Discharge Plan most likely back to the a:  Personal care boarding home with act services once her delusions are managed and mood becomes more stabilized and she becomes more receptive to treatment compliance    Counseling / Coordination of Care    Total floor / unit time spent today 15 minutes  Greater than 50% of total time was spent with the patient and / or family counseling and / or coordination of care  A description of the counseling / coordination of care  Patient's Rights, confidentiality and exceptions to confidentiality, use of automated medical record, [facility] to [facility] record, and consent to treatment reviewed      Sindy Day MD

## 2019-08-15 NOTE — PLAN OF CARE
Problem: Alteration in Thoughts and Perception  Goal: Agree to be compliant with medication regime, as prescribed and report medication side effects  Description  Interventions:  - Offer appropriate PRN medication and supervise ingestion; conduct aims, as needed   Outcome: Progressing  Goal: Attend and participate in unit activities, including therapeutic, recreational, and educational groups  Description  Interventions:  - Provide therapeutic and educational activities daily, encourage attendance and participation, and document same in the medical record   Outcome: Progressing     Problem: Alteration in Thoughts and Perception  Goal: Verbalize thoughts and feelings  Description  Interventions:  - Promote a nonjudgmental and trusting relationship with the patient through active listening and therapeutic communication  - Assess patient's level of functioning, behavior and potential for risk  - Engage patient in 1 on 1 interactions for a minimum of 15 minutes each session  - Encourage patient to express fears, feelings, frustrations, and discuss symptoms    - [place] patient to reality, help patient recognize reality-based thinking   - Administer medications as ordered and assess for potential side effects  - Provide the patient education related to the signs and symptoms of the illness and desired effects of prescribed medications  Outcome: Not Progressing   Patient remains delusional regarding her physical ailments  She complains of being SOB however, her O2 sats are WNL and there are no signs that she is struggling to breathe  Patient is mostly isolative to her room, laying in bed sleeping for the good part of her day  She did attend IMR group and was out for her meals  Will continue to monitor

## 2019-08-15 NOTE — PLAN OF CARE
Problem: Alteration in Thoughts and Perception  Goal: Verbalize thoughts and feelings  Description  Interventions:  - Promote a nonjudgmental and trusting relationship with the patient through active listening and therapeutic communication  - Assess patient's level of functioning, behavior and potential for risk  - Engage patient in 1 on 1 interactions for a minimum of 15 minutes each session  - Encourage patient to express fears, feelings, frustrations, and discuss symptoms    - [place] patient to reality, help patient recognize reality-based thinking   - Administer medications as ordered and assess for potential side effects  - Provide the patient education related to the signs and symptoms of the illness and desired effects of prescribed medications  Outcome: Progressing  Goal: Agree to be compliant with medication regime, as prescribed and report medication side effects  Description  Interventions:  - Offer appropriate PRN medication and supervise ingestion; conduct aims, as needed   Outcome: Progressing     Problem: RESPIRATORY - ADULT  Goal: Achieves optimal ventilation and oxygenation  Description  INTERVENTIONS:  - Assess for changes in respiratory status  - Assess for changes in mentation and behavior  - Position to facilitate oxygenation and minimize respiratory effort  - Oxygen administration by appropriate delivery method based on oxygen saturation (per order) or ABGs  - Initiate smoking cessation education as indicated  - Encourage broncho-pulmonary hygiene including cough, deep breathe, Incentive Spirometry  - Assess the need for suctioning and aspirate as needed  - Assess and instruct to report SOB or any respiratory difficulty  - Respiratory Therapy support as indicated  Outcome: Progressing     Problem: Alteration in Thoughts and Perception  Goal: Attend and participate in unit activities, including therapeutic, recreational, and educational groups  Description  Interventions:  - Provide therapeutic and educational activities daily, encourage attendance and participation, and document same in the medical record   Outcome: Not Progressing  Goal: Complete daily ADLs, including personal hygiene independently, as able  Description  Interventions:  - Observe, teach, and assist patient with ADLS  - Monitor and promote a balance of rest/activity, with adequate nutrition and elimination   Outcome: Not Progressing     Problem: Depression  Goal: Refrain from isolation  Description  Interventions:  - Develop a trusting relationship   - Encourage socialization   Outcome: Not Progressing     Problem: Anxiety  Goal: Anxiety is at manageable level  Description  Interventions:  - Assess and monitor patient's anxiety level  - Monitor for signs and symptoms of anxiety both physical and emotional (heart palpitations, chest pain, shortness of breath, headaches, nausea, feeling jumpy, restlessness, irritable, apprehensive)  - Collaborate with interdisciplinary team and initiate plan and interventions as ordered  - [place] patient to unit/surroundings  - Explain treatment plan  - Encourage participation in care  - Encourage verbalization of concerns/fears  - Identify coping mechanisms  - Assist in developing anxiety-reducing skills  - Administer/offer alternative therapies  - Limit or eliminate stimulants  Outcome: Not Progressing     [address] has been somatic throughout the shift  Ate poorly @ meal, only 20%  Saying too SOB to possibly attempt any [place] walks as tired from sitting out in chair on 7-3  By 2005 worrisome about her blood sugar plummeting; accu check showed it to be 100  Did not attend PM Group  Did come out for scheduled medicines  Had an HS snack, but, complaining couldn't swallow the cheese, was choking  Concludes may have been anxious as says a male peer was staring @ her  Then spoke of having "agoraphobia"; the arrieta being "too wide" & frightening her   Concluded the only thing not particularly bothering her tonight was her breathing  She is disheveled, no attempt made to address hygiene  Is wearing her QHS nasal O2 @ 1L via cannula/compressor  Pre-application PO2 was 38%

## 2019-08-15 NOTE — PROGRESS NOTES
Progress Note - Jose Ramon Roblero 1955, 60 y o  female MRN: [id number]    [facility] Encounter: [id number]    Primary Care Provider: Arcadio Moreno MD   Date and time admitted to hospital: 7/23/2019  5:30 PM        * Schizoaffective disorder, bipolar type [facility])  Assessment & Plan  Psychiatry Progress Note    Subjective: Interval History     Patient has been attending only sporadic groups and is still focused and preoccupied about her breathing difficulty despite the fact that she is breathing fine and her pulse ox has been acceptable  Her vital signs have been stable  She believes that these switch in the dosage timing of med it the medication Clozaril has helped but still complains about occasional drooling which has lessened with the splitting of the dosage of Clozaril  She has chosen not to to use the portable oxygen to get out of bed and go to groups if necessary  She continues to state in a monotonous, stilted and rambling manner as usual as if talking in a court of law  She still appears to harbor some covert as well as overt paranoia off and on  She is still points to the lipoma on her and claims that it is a micro chip implanted on and this appears to be a fixed delusion  She still accuses the staff of not giving her the right inhalant and still refuses to take the Atrovent inhaler claiming that there is "peanut oil in it  She has been sleeping well and has been using the nasal oxygen at night  She is compliant with medications so far but is questioning whether she needs the clozapine at this time and is willing to stay on it any for the time being  No current suicidal homicidal thoughts intent or plans reported    She is casually dressed fairly groomed but is guarded suspicious evasive been in denial of her illness as usual   No overt hallucinations or other delusions reported other than some underlying paranoid delusions and beliefs that there is a micro chip implanted on her left forearm   No signs or sxs of agranulocytosis or myocarditis or endocarditis and she is moving her bowels so far with no issues    Staff has been encouraging to accept her medications and attend more groups as scheduled and work on her recovery   Current medications:    Current Facility-Administered Medications:     acetaminophen (TYLENOL) tablet 650 mg, 650 mg, Oral, Q6H PRN, Sarah Hanna MD    acetaminophen (TYLENOL) tablet 650 mg, 650 mg, Oral, Q6H PRN, Sarah Hanna MD    acetaminophen (TYLENOL) tablet 650 mg, 650 mg, Oral, Q6H PRN, Sarah Hanna MD    albuterol (PROVENTIL HFA,VENTOLIN HFA) inhaler 2 puff, 2 puff, Inhalation, Q4H PRN, Sarah Hanna MD, 2 puff at 07/25/19 1200    benzonatate (TESSALON PERLES) capsule 100 mg, 100 mg, Oral, TID PRN, Sarah Hanna MD    cloZAPine (CLOZARIL) tablet 75 mg, 75 mg, Oral, BID, Sarah Hanna MD, 75 mg at 08/14/19 2151    FLUoxetine (PROzac) capsule 10 mg, 10 mg, Oral, Daily, Sarah Hanna MD, 10 mg at 08/14/19 0840    fluticasone-vilanterol (BREO ELLIPTA) 200-25 MCG/INH inhaler 1 puff, 1 puff, Inhalation, Daily, Sarah Hanna MD, 1 puff at 08/14/19 0840    levothyroxine tablet 125 mcg, 125 mcg, Oral, Early Morning, Sarah Hanna MD, 125 mcg at 08/15/19 0554    OLANZapine (ZyPREXA) tablet 5 mg, 5 mg, Oral, Q8H PRN, Sarah Hanna MD    pantoprazole (PROTONIX) EC tablet 40 mg, 40 mg, Oral, Early Morning, Sarah Hanna MD, 40 mg at 08/15/19 0554    theophylline (JEF-24) 24 hr capsule 200 mg, 200 mg, Oral, Daily, Sarah Hanna MD, 200 mg at 08/14/19 9833    traZODone (DESYREL) tablet 25 mg, 25 mg, Oral, HS PRN, Sarah Hanna MD  Justification if on more than two antipsychotics:  Only on his clozapine  Side effects if any:  None    Risks , benefits, side effects and precautions of medications discussed with patient and reminded patient to let us know any problems with medications     Objective:     Vital Signs:  Vitals:    08/13/19 2145 08/14/19 0730 08/14/19 0732 08/14/19 2150   BP:  115/68 117/67    BP Location:  Left arm Left arm    Pulse:  89 94    Resp:  16     Temp:  98 1 °F (36 7 °C)     TempSrc:  Temporal     SpO2: 92%   93%   Weight:       Height:         Appearance:  age appropriate, casually dressed and overweight older than stated age   Behavior:  deviant, evasive and guarded and suspicious but with good eye contact   Speech:  normal pitch and normal volume but tends to become loud at times   Mood:  anxious and dysthymic   Affect:  mood-congruent   Thought Process:  goal directed and illogical slightly pressured but able to be redirected   Thought Content:  delusions  grandiose and somatic about not being able to breathe despite being on nasal oxygen and paranoid about people out to harm her and Atrovent inhaler tainteded with peanut oil  No current suicidal homicidal thoughts intent or plans reported  No phobias obsessions or compulsions reported   Perceptual Disturbances: None and does not appear responding to internal stimuli   Risk Potential: Tendency to not care for herself if she goes off treatment   Sensorium:  person, place, time/date, situation, day of week, month of year and time   Cognition:  grossly intact   Consciousness:  alert and awake    Attention: Intact concentration and attention span   Intellect: Considered to be at least of average intelligence   Insight:  limited in denial   Judgment: poor      Motor Activity: no abnormal movements         Recent Labs:  Results Reviewed     None          I/O Past 24 hours:  No intake/output data recorded  No intake/output data recorded          Assessment / Plan:     [facility]oaffective [facility]      Reason for continued inpatient care:  Because of underlying paranoia and refusal to go back to the personal care home and inability to care for herself on her own  Acceptance by patient:  Accepting  Hopefulness in recovery:  About living in another personal care home once she feels better  Understanding of medications :  Has some understanding  Involved in reintegration process:  Adjusting to the unit  Trusting in relatoinship with psychiatrist:  Trusting    Recommended Treatment:    Medication changes:  1) none today  Non-pharmacological treatments  1) start individual therapy group therapy, milieu therapy and occupational therapy and milieu therapy involving multidisciplinary team approach with psychotherapist, case management, nursing, peer support services, art therapist etc using recovery principles and psycho-education about accepting illness and need for treatment   Safety  1) Safety/communication plan established targeting dynamic risk factors above  Discharge Plan most likely back to the a:  Personal care boarding home with act services once her delusions are managed and mood becomes more stabilized and she becomes more receptive to treatment compliance    Counseling / Coordination of Care    Total floor / unit time spent today 15 minutes  Greater than 50% of total time was spent with the patient and / or family counseling and / or coordination of care  A description of the counseling / coordination of care  Patient's Rights, confidentiality and exceptions to confidentiality, use of automated medical record, [facility] staff access to medical record, and consent to treatment reviewed      Jerome Lopez MD

## 2019-08-15 NOTE — PLAN OF CARE
Problem: PAIN - ADULT  Goal: Verbalizes/displays adequate comfort level or baseline comfort level  Description  Interventions:  - Encourage patient to monitor pain and request assistance  - Assess pain using appropriate pain scale  - Administer analgesics based on type and severity of pain and evaluate response  - Implement non-pharmacological measures as appropriate and evaluate response  - Consider cultural and social influences on pain and pain management  - Notify physician/advanced practitioner if interventions unsuccessful or patient reports new pain  Outcome: Progressing     Problem: SAFETY ADULT  Goal: Patient will remain free of falls  Description  INTERVENTIONS:  - Assess patient frequently for physical needs  -  Identify cognitive and physical deficits and behaviors that affect risk of falls  -  [place] fall precautions as indicated by assessment   - Educate patient/family on patient safety including physical limitations  - Instruct patient to call for assistance with activity based on assessment  - Modify environment to reduce risk of injury  - Consider OT/PT consult to assist with strengthening/mobility  Outcome: Ronnie Harris maintained on ongoing fall and SAFE precaution without incident on this shift  Laying in bed with eyes closed, breath even and unlabored with O2@ 1L/M via nasal cannula  No sign or symptom of respiratory distress  Mat Cast will continue to remain free from fall  Denies any pain or discomfort at this moment  Will continue to monitor behavior, pain and sleep pattern

## 2019-08-16 LAB
BASOPHILS # BLD AUTO: 0 THOUSANDS/ΜL (ref 0–0.1)
BASOPHILS NFR BLD AUTO: 0 % (ref 0–1)
EOSINOPHIL # BLD AUTO: 0 THOUSAND/ΜL (ref 0–0.4)
EOSINOPHIL NFR BLD AUTO: 0 % (ref 0–6)
ERYTHROCYTE [DISTWIDTH] IN BLOOD BY AUTOMATED COUNT: 15.6 %
HCT VFR BLD AUTO: 47.3 % (ref 36–46)
HGB BLD-MCNC: 15.4 G/DL (ref 12–16)
LYMPHOCYTES # BLD AUTO: 2.2 THOUSANDS/ΜL (ref 0.5–4)
LYMPHOCYTES NFR BLD AUTO: 17 % (ref 25–45)
MCH RBC QN AUTO: 30.4 PG (ref 26–34)
MCHC RBC AUTO-ENTMCNC: 32.6 G/DL (ref 31–36)
MCV RBC AUTO: 94 FL (ref 80–100)
MONOCYTES # BLD AUTO: 0.6 THOUSAND/ΜL (ref 0.2–0.9)
MONOCYTES NFR BLD AUTO: 5 % (ref 1–10)
NEUTROPHILS # BLD AUTO: 10.3 THOUSANDS/ΜL (ref 1.8–7.8)
NEUTS SEG NFR BLD AUTO: 78 % (ref 45–65)
PLATELET # BLD AUTO: 249 THOUSANDS/UL (ref 150–450)
PLATELET BLD QL SMEAR: ADEQUATE
PMV BLD AUTO: 9.6 FL (ref 8.9–12.7)
RBC # BLD AUTO: 5.06 MILLION/UL (ref 4–5.2)
RBC MORPH BLD: NORMAL
WBC # BLD AUTO: 13.2 THOUSAND/UL (ref 4.5–11)

## 2019-08-16 PROCEDURE — NC001 PR NO CHARGE: Performed by: FAMILY MEDICINE

## 2019-08-16 PROCEDURE — 85025 COMPLETE CBC W/AUTO DIFF WBC: CPT | Performed by: PSYCHIATRY & NEUROLOGY

## 2019-08-16 PROCEDURE — 80159 DRUG ASSAY CLOZAPINE: CPT | Performed by: PSYCHIATRY & NEUROLOGY

## 2019-08-16 PROCEDURE — 99232 SBSQ HOSP IP/OBS MODERATE 35: CPT | Performed by: PSYCHIATRY & NEUROLOGY

## 2019-08-16 RX ORDER — CLOZAPINE 25 MG/1
50 TABLET ORAL 3 TIMES DAILY
Status: DISCONTINUED | OUTPATIENT
Start: 2019-08-16 | End: 2019-09-18

## 2019-08-16 RX ADMIN — CLOZAPINE 50 MG: 25 TABLET ORAL at 21:33

## 2019-08-16 RX ADMIN — LEVOTHYROXINE SODIUM 125 MCG: 125 TABLET ORAL at 06:09

## 2019-08-16 RX ADMIN — TIOTROPIUM BROMIDE 18 MCG: 18 CAPSULE ORAL; RESPIRATORY (INHALATION) at 18:06

## 2019-08-16 RX ADMIN — THEOPHYLLINE ANHYDROUS 200 MG: 200 CAPSULE, EXTENDED RELEASE ORAL at 08:59

## 2019-08-16 RX ADMIN — CLOZAPINE 50 MG: 25 TABLET ORAL at 18:06

## 2019-08-16 RX ADMIN — PANTOPRAZOLE SODIUM 40 MG: 40 TABLET, DELAYED RELEASE ORAL at 06:09

## 2019-08-16 RX ADMIN — FLUOXETINE 10 MG: 10 CAPSULE ORAL at 08:59

## 2019-08-16 RX ADMIN — FLUTICASONE FUROATE AND VILANTEROL TRIFENATATE 1 PUFF: 200; 25 POWDER RESPIRATORY (INHALATION) at 08:33

## 2019-08-16 NOTE — PLAN OF CARE
Problem: PAIN - ADULT  Goal: Verbalizes/displays adequate comfort level or baseline comfort level  Description  Interventions:  - Encourage patient to monitor pain and request assistance  - Assess pain using appropriate pain scale  - Administer analgesics based on type and severity of pain and evaluate response  - Implement non-pharmacological measures as appropriate and evaluate response  - Consider cultural and social influences on pain and pain management  - Notify physician/advanced practitioner if interventions unsuccessful or patient reports new pain  Outcome: Progressing     Problem: SAFETY ADULT  Goal: Patient will remain free of falls  Description  INTERVENTIONS:  - Assess patient frequently for physical needs  -  Identify cognitive and physical deficits and behaviors that affect risk of falls    -  [place] fall precautions as indicated by assessment   - Educate patient/family on patient safety including physical limitations  - Instruct patient to call for assistance with activity based on assessment  - Modify environment to reduce risk of injury  - Consider OT/PT consult to assist with strengthening/mobility  Outcome: Progressing     Problem: RESPIRATORY - ADULT  Goal: Achieves optimal ventilation and oxygenation  Description  INTERVENTIONS:  - Assess for changes in respiratory status  - Assess for changes in mentation and behavior  - Position to facilitate oxygenation and minimize respiratory effort  - Oxygen administration by appropriate delivery method based on oxygen saturation (per order) or ABGs  - Initiate smoking cessation education as indicated  - Encourage broncho-pulmonary hygiene including cough, deep breathe, Incentive Spirometry  - Assess the need for suctioning and aspirate as needed  - Assess and instruct to report SOB or any respiratory difficulty  - Respiratory Therapy support as indicated  Outcome: Progressing      ~Maggie maintained on ongoing fall and SAFE precaution without incident on this shift   Laying in bed with eyes closed, breath even and unlabored with O2@ 1L/M via nasal cannula   No sign or symptom of respiratory distress  Lona Hill will continue to remain free from fall   Denies any pain or discomfort at this moment   Will continue to monitor behavior, pain and sleep pattern   ~Maggie has a weekly scheduled CBCw/Diff to be obtain in the morning

## 2019-08-16 NOTE — PROGRESS NOTES
0522 [facility] provider Dr Godwin Erps regarding Maggie's recent labwork regarding elevated ANC and WBC  Waiting for follow up

## 2019-08-16 NOTE — PROGRESS NOTES
Progress Note - Jose Ramon Roblero 1955, 60 y o  female MRN: [id number]    [facility]59 Encounter: [id number]    Primary Care Provider: Arcadio Moreno MD   Date and time admitted to hospital: 7/23/2019  5:30 PM        * Schizoaffective disorder, bipolar type [facility])  Assessment & Plan  Psychiatry Progress Note    Subjective: Interval History     Patient is now complaining about feeling like having a hangover for last few days  She  has been attending only sporadic groups and is still focused and preoccupied about her breathing difficulty despite the fact that she is breathing fine and her pulse ox has been acceptable so far  She feels slightly better since the clozapine was switched around but since she is on Prozac we need to check the clozapine level to see if it is very high causing her symptoms  Her vital signs have been stable  She still complains about occasional drooling which has lessened with the splitting of the dosage of Clozaril  She has chosen not to to use the portable oxygen to get out of bed and go to groups if necessary  She continues to talk in a monotonous, stilted and rambling manner as usual as if talking in a court of law  She does not admit to any overt hallucinations or paranoia but  still appears to harbor some covert  paranoia off and on as she does not always trust certain nursing staff giving her the medications  She  still points to the lipoma on her and claims that it is a micro chip implanted on and this appears to be a fixed delusion  She still accuses the staff of not giving her the right inhalant and still refuses to take the Atrovent inhaler claiming that there is "peanut oil in it  She has been sleeping well and has been using the nasal oxygen at night  No current suicidal homicidal thoughts intent or plans reported    She is casually dressed fairly groomed but is guarded suspicious evasive been in denial of her illness as usual   No overt hallucinations or other delusions reported other than some underlying paranoid delusions and beliefs that there is a micro chip implanted on her left forearm   No signs or sxs of agranulocytosis or myocarditis or endocarditis and she is moving her bowels so far with no issues  Her repeat EKG shows QTC still slightly high around 467 but she is asymptomatic and it has not gotten worse had actually gone down from 4 81 from a month ago,  since the addition of clozapine    Staff has been encouraging to accept her medications and attend more groups as scheduled and work on her recovery and is now willing to further spread the clozapine as 3 times a day and get her clozapine level checked   Current medications:    Current Facility-Administered Medications:     acetaminophen (TYLENOL) tablet 650 mg, 650 mg, Oral, Q6H PRN, Roberto Shankar MD    acetaminophen (TYLENOL) tablet 650 mg, 650 mg, Oral, Q6H PRN, Roberto Shankar MD    acetaminophen (TYLENOL) tablet 650 mg, 650 mg, Oral, Q6H PRN, Roberto Shankar MD    albuterol (PROVENTIL HFA,VENTOLIN HFA) inhaler 2 puff, 2 puff, Inhalation, Q4H PRN, Roberto Shankar MD, 2 puff at 07/25/19 1200    benzonatate (TESSALON PERLES) capsule 100 mg, 100 mg, Oral, TID PRN, Roberto Shankar MD    cloZAPine (CLOZARIL) tablet 50 mg, 50 mg, Oral, TID, Roberto Shankar MD    FLUoxetine (PROzac) capsule 10 mg, 10 mg, Oral, Daily, Roberto Shankar MD, 10 mg at 08/16/19 0859    fluticasone-vilanterol (BREO ELLIPTA) 200-25 MCG/INH inhaler 1 puff, 1 puff, Inhalation, Daily, Roberto Shankar MD, 1 puff at 08/16/19 7581    levothyroxine tablet 125 mcg, 125 mcg, Oral, Early Morning, Roberto Shankar MD, 125 mcg at 08/16/19 0609    OLANZapine (ZyPREXA) tablet 5 mg, 5 mg, Oral, Q8H PRN, Roberto Shankar MD    pantoprazole (PROTONIX) EC tablet 40 mg, 40 mg, Oral, Early Morning, Roberto Shankar MD, 40 mg at 08/16/19 0609    theophylline (JEF-24) 24 hr capsule 200 mg, 200 mg, Oral, Daily, Roberto Shankar MD, 200 mg at 08/16/19 0859    traZODone (DESYREL) tablet 25 mg, 25 mg, Oral, HS PRN, Carissa Willis MD  Justification if on more than two antipsychotics:  Only on his clozapine  Side effects if any:  None    Risks , benefits, side effects and precautions of medications discussed with patient and reminded patient to let us know any problems with medications     Objective:     Vital Signs:  Vitals:    08/15/19 0705 08/15/19 2145 08/16/19 0730 08/16/19 0732   BP: 104/64  121/78 104/65   BP Location: Left arm  Left arm Left arm   Pulse: 103  101 (!) 116   Resp: 18  18    Temp:   97 8 °F (36 6 °C)    TempSrc:   Temporal    SpO2:  90%     Weight:       Height:         Appearance:  age appropriate, casually dressed and overweight older than stated age   Behavior:  deviant, evasive and guarded and suspicious but with good eye contact   Speech:  normal pitch and normal volume but tends to become loud at times   Mood:  anxious and dysthymic   Affect:  mood-congruent   Thought Process:  goal directed and illogical slightly pressured but able to be redirected and talks as if in a court of low being stilted   Thought Content:  delusions  grandiose and somatic about not being able to breathe despite being on nasal oxygen and paranoid about people out to harm her and Atrovent inhaler tainteded with peanut oil  No current suicidal homicidal thoughts intent or plans reported    No phobias obsessions or compulsions reported   Perceptual Disturbances: None and does not appear responding to internal stimuli   Risk Potential: Tendency to not care for herself if she goes off treatment   Sensorium:  person, place, time/date, situation, day of week, month of year and time   Cognition:  grossly intact   Consciousness:  alert and awake    Attention: Intact concentration and attention span   Intellect: Considered to be at least of average intelligence   Insight:  limited in denial   Judgment: poor      Motor Activity: no abnormal movements         Recent Labs:  Results Reviewed None          I/O Past 24 hours:  No intake/output data recorded  No intake/output data recorded  Assessment / Plan:     Schizoaffective disorder, bipolar type ([address] )      Reason for continued inpatient care:  Because of underlying paranoia and refusal to go back to the personal care home and inability to care for herself on her own  Acceptance by patient:  Accepting  Regan Keita in recovery:  About living in another personal care home once she feels better  Understanding of medications :  Has some understanding  Involved in reintegration process:  Adjusting to the unit  Trusting in relatoinship with psychiatrist:  Trusting    Recommended Treatment:    Medication changes:  1) switch clozapine as 50 milligram 3 times a day due to morning hangover feeling and check clozapine level being on Prozac as she refuses to switch it to Zoloft when suggested as Prozac might increase the blood level of clozapine  Non-pharmacological treatments  1) start individual therapy group therapy, milieu therapy and occupational therapy and milieu therapy involving multidisciplinary team approach with psychotherapist, case management, nursing, peer support services, art therapist etc using recovery principles and psycho-education about accepting illness and need for treatment   Safety  1) Safety/communication plan established targeting dynamic risk factors above  Discharge Plan most likely back to the a:  Personal care boarding home with act services once her delusions are managed and mood becomes more stabilized and she becomes more receptive to treatment compliance    Counseling / Coordination of Care    Total floor / unit time spent today 15 minutes  Greater than 50% of total time was spent with the patient and / or family counseling and / or coordination of care  A description of the counseling / coordination of care       Patient's Rights, confidentiality and exceptions to confidentiality, use of automated medical record, [facility] staff access to medical record, and consent to treatment reviewed      Meldon Curling, MD

## 2019-08-16 NOTE — ASSESSMENT & PLAN NOTE
Psychiatry Progress Note    Subjective: Interval History     Patient is now complaining about feeling like having a hangover for last few days  She  has been attending only sporadic groups and is still focused and preoccupied about her breathing difficulty despite the fact that she is breathing fine and her pulse ox has been acceptable so far  She feels slightly better since the clozapine was switched around but since she is on Prozac we need to check the clozapine level to see if it is very high causing her symptoms  Her vital signs have been stable  She still complains about occasional drooling which has lessened with the splitting of the dosage of Clozaril  She has chosen not to to use the portable oxygen to get out of bed and go to groups if necessary  She continues to talk in a monotonous, stilted and rambling manner as usual as if talking in a court of law  She does not admit to any overt hallucinations or paranoia but  still appears to harbor some covert  paranoia off and on as she does not always trust certain nursing staff giving her the medications  She  still points to the lipoma on her and claims that it is a micro chip implanted on and this appears to be a fixed delusion  She still accuses the staff of not giving her the right inhalant and still refuses to take the Atrovent inhaler claiming that there is "peanut oil in it  She has been sleeping well and has been using the nasal oxygen at night  No current suicidal homicidal thoughts intent or plans reported  She is casually dressed fairly groomed but is guarded suspicious evasive been in denial of her illness as usual   No overt hallucinations or other delusions reported other than some underlying paranoid delusions and beliefs that there is a micro chip implanted on her left forearm   No signs or sxs of agranulocytosis or myocarditis or endocarditis and she is moving her bowels so far with no issues    Her repeat EKG shows QTC still slightly high around 467 but she is asymptomatic and it has not gotten worse had actually gone down from 4 81 from a month ago,  since the addition of clozapine    Staff has been encouraging to accept her medications and attend more groups as scheduled and work on her recovery and is now willing to further spread the clozapine as 3 times a day and get her clozapine level checked   Current medications:    Current Facility-Administered Medications:     acetaminophen (TYLENOL) tablet 650 mg, 650 mg, Oral, Q6H PRN, Amina Aparicio MD    acetaminophen (TYLENOL) tablet 650 mg, 650 mg, Oral, Q6H PRN, Amina Aparicio MD    acetaminophen (TYLENOL) tablet 650 mg, 650 mg, Oral, Q6H PRN, Amina Aparicio MD    albuterol (PROVENTIL HFA,VENTOLIN HFA) inhaler 2 puff, 2 puff, Inhalation, Q4H PRN, Amina Aparicio MD, 2 puff at 07/25/19 1200    benzonatate (TESSALON PERLES) capsule 100 mg, 100 mg, Oral, TID PRN, Amina Aparicio MD    cloZAPine (CLOZARIL) tablet 50 mg, 50 mg, Oral, TID, Amina Aparicio MD    FLUoxetine (PROzac) capsule 10 mg, 10 mg, Oral, Daily, Amina Aparicio MD, 10 mg at 08/16/19 0859    fluticasone-vilanterol (BREO ELLIPTA) 200-25 MCG/INH inhaler 1 puff, 1 puff, Inhalation, Daily, Amina Aparicio MD, 1 puff at 08/16/19 2768    levothyroxine tablet 125 mcg, 125 mcg, Oral, Early Morning, Amina Aparicio MD, 125 mcg at 08/16/19 0609    OLANZapine (ZyPREXA) tablet 5 mg, 5 mg, Oral, Q8H PRN, Amina Aparicio MD    pantoprazole (PROTONIX) EC tablet 40 mg, 40 mg, Oral, Early Morning, Amina Aparicio MD, 40 mg at 08/16/19 0609    theophylline (JEF-24) 24 hr capsule 200 mg, 200 mg, Oral, Daily, Amina Aparicio MD, 200 mg at 08/16/19 0859    traZODone (DESYREL) tablet 25 mg, 25 mg, Oral, HS PRN, Amina Aparicio MD  Justification if on more than two antipsychotics:  Only on his clozapine  Side effects if any:  None    Risks , benefits, side effects and precautions of medications discussed with patient and reminded patient to let us know any problems with medications     Objective:     Vital Signs:  Vitals:    08/15/19 0705 08/15/19 2145 08/16/19 0730 08/16/19 0732   BP: 104/64  121/78 104/65   BP Location: Left arm  Left arm Left arm   Pulse: 103  101 (!) 116   Resp: 18  18    Temp:   97 8 °F (36 6 °C)    TempSrc:   Temporal    SpO2:  90%     Weight:       Height:         Appearance:  age appropriate, casually dressed and overweight older than stated age   Behavior:  deviant, evasive and guarded and suspicious but with good eye contact   Speech:  normal pitch and normal volume but tends to become loud at times   Mood:  anxious and dysthymic   Affect:  mood-congruent   Thought Process:  goal directed and illogical slightly pressured but able to be redirected and talks as if in a court of low being stilted   Thought Content:  delusions  grandiose and somatic about not being able to breathe despite being on nasal oxygen and paranoid about people out to harm her and Atrovent inhaler tainteded with peanut oil  No current suicidal homicidal thoughts intent or plans reported  No phobias obsessions or compulsions reported   Perceptual Disturbances: None and does not appear responding to internal stimuli   Risk Potential: Tendency to not care for herself if she goes off treatment   Sensorium:  person, place, time/date, situation, day of week, month of year and time   Cognition:  grossly intact   Consciousness:  alert and awake    Attention: Intact concentration and attention span   Intellect: Considered to be at least of average intelligence   Insight:  limited in denial   Judgment: poor      Motor Activity: no abnormal movements         Recent Labs:  Results Reviewed     None          I/O Past 24 hours:  No intake/output data recorded  No intake/output data recorded          Assessment / Plan:     Schizoaffective disorder, bipolar type [facility])      Reason for continued inpatient care:  Because of underlying paranoia and refusal to go back to the personal care home and inability to care for herself on her own  Acceptance by patient:  Accepting  Eric Cevallos in recovery:  About living in another personal care home once she feels better  Understanding of medications :  Has some understanding  Involved in reintegration process:  Adjusting to the unit  Trusting in relatoinship with psychiatrist:  Trusting    Recommended Treatment:    Medication changes:  1) switch clozapine as 50 milligram 3 times a day due to morning hangover feeling and check clozapine level being on Prozac as she refuses to switch it to Zoloft when suggested as Prozac might increase the blood level of clozapine  Non-pharmacological treatments  1) start individual therapy group therapy, milieu therapy and occupational therapy and milieu therapy involving multidisciplinary team approach with psychotherapist, case management, nursing, peer support services, art therapist etc using recovery principles and psycho-education about accepting illness and need for treatment   Safety  1) Safety/communication plan established targeting dynamic risk factors above  Discharge Plan most likely back to the a:  Personal care boarding home with act services once her delusions are managed and mood becomes more stabilized and she becomes more receptive to treatment compliance    Counseling / Coordination of Care    Total floor / unit time spent today 15 minutes  Greater than 50% of total time was spent with the patient and / or family counseling and / or coordination of care  A description of the counseling / coordination of care  Patient's Rights, confidentiality and exceptions to confidentiality, use of automated medical record, [facility] staff access to medical record, and consent to treatment reviewed      Jeet Levine MD

## 2019-08-16 NOTE — PLAN OF CARE
Problem: Alteration in Thoughts and Perception  Goal: Agree to be compliant with medication regime, as prescribed and report medication side effects  Description  Interventions:  - Offer appropriate PRN medication and supervise ingestion; conduct aims, as needed   Outcome: Progressing  Goal: Attend and participate in unit activities, including therapeutic, recreational, and educational groups  Description  Interventions:  - Provide therapeutic and educational activities daily, encourage attendance and participation, and document same in the medical record   Outcome: Progressing     Problem: Ineffective Coping  Goal: Understands least restrictive measures  Description  Interventions:  - Utilize least restrictive behavior  Outcome: Progressing     Problem: Risk for Self Injury/Neglect  Goal: Refrain from harming self  Description  Interventions:  - Monitor patient closely, per order  - Develop a trusting relationship  - Supervise medication ingestion, monitor effects and side effects   Outcome: Progressing     Problem: Anxiety  Goal: Anxiety is at manageable level  Description  Interventions:  - Assess and monitor patient's anxiety level  - Monitor for signs and symptoms of anxiety both physical and emotional (heart palpitations, chest pain, shortness of breath, headaches, nausea, feeling jumpy, restlessness, irritable, apprehensive)  - Collaborate with interdisciplinary team and initiate plan and interventions as ordered    - [place] patient to unit/surroundings  - Explain treatment plan  - Encourage participation in care  - Encourage verbalization of concerns/fears  - Identify coping mechanisms  - Assist in developing anxiety-reducing skills  - Administer/offer alternative therapies  - Limit or eliminate stimulants  Outcome: Progressing     Problem: Alteration in Orientation  Goal: Interact with staff daily  Description  Interventions:  - Assess and re-assess patient's level of orientation  - Engage patient in 1 on 1 interactions, daily, for a minimum of 15 minutes   - Establish rapport/trust with patient   Outcome: Progressing     Problem: SAFETY ADULT  Goal: Patient will remain free of falls  Description  INTERVENTIONS:  - Assess patient frequently for physical needs  -  Identify cognitive and physical deficits and behaviors that affect risk of falls  -  [place] fall precautions as indicated by assessment   - Educate patient/family on patient safety including physical limitations  - Instruct patient to call for assistance with activity based on assessment  - Modify environment to reduce risk of injury  - Consider OT/PT consult to assist with strengthening/mobility  Outcome: Progressing     Problem: RESPIRATORY - ADULT  Goal: Achieves optimal ventilation and oxygenation  Description  INTERVENTIONS:  - Assess for changes in respiratory status  - Assess for changes in mentation and behavior  - Position to facilitate oxygenation and minimize respiratory effort  - Oxygen administration by appropriate delivery method based on oxygen saturation (per order) or ABGs  - Initiate smoking cessation education as indicated  - Encourage broncho-pulmonary hygiene including cough, deep breathe, Incentive Spirometry  - Assess the need for suctioning and aspirate as needed  - Assess and instruct to report SOB or any respiratory difficulty  - Respiratory Therapy support as indicated  Outcome: Progressing     Problem: Alteration in Thoughts and Perception  Goal: Complete daily ADLs, including personal hygiene independently, as able  Description  Interventions:  - Observe, teach, and assist patient with ADLS  - Monitor and promote a balance of rest/activity, with adequate nutrition and elimination   Outcome: Not Progressing   Pareshoren Long has been in her room most of the shift  Keeps to self most of the time  She does converse with her roommate when she is in her room  Some interaction with select peers when in the dining room   Pleasant and cooperative upon approach by staff  Ate 50% of her meal  BM this shift  No complaint of shortness of breath  Took medication after her meal  Did not attend group this shift  Came out for snack  Took HS medication and then asked for her oxygen  Oxygen saturation was 90% on room air before oxygen 1 liter was applied  Continue to monitor  Precautions maintained

## 2019-08-16 NOTE — PLAN OF CARE
Problem: Alteration in Thoughts and Perception  Goal: Verbalize thoughts and feelings  Description  Interventions:  - Promote a nonjudgmental and trusting relationship with the patient through active listening and therapeutic communication  - Assess patient's level of functioning, behavior and potential for risk  - Engage patient in 1 on 1 interactions for a minimum of 15 minutes each session  - Encourage patient to express fears, feelings, frustrations, and discuss symptoms    - [place] patient to reality, help patient recognize reality-based thinking   - Administer medications as ordered and assess for potential side effects  - Provide the patient education related to the signs and symptoms of the illness and desired effects of prescribed medications  Outcome: Progressing  Goal: Agree to be compliant with medication regime, as prescribed and report medication side effects  Description  Interventions:  - Offer appropriate PRN medication and supervise ingestion; conduct aims, as needed   Outcome: Progressing  Goal: Complete daily ADLs, including personal hygiene independently, as able  Description  Interventions:  - Observe, teach, and assist patient with ADLS  - Monitor and promote a balance of rest/activity, with adequate nutrition and elimination   Outcome: Not Progressing     Problem: Ineffective Coping  Goal: Participates in unit activities  Description  Interventions:  - Provide therapeutic environment   - Provide required programming   - Redirect inappropriate behaviors   Outcome: Progressing  Goal: Patient/Family verbalizes awareness of resources  Outcome: Not Progressing     Problem: Depression  Goal: Refrain from isolation  Description  Interventions:  - Develop a trusting relationship   - Encourage socialization   Outcome: Not Progressing     Problem: Alteration in Orientation  Goal: Interact with staff daily  Description  Interventions:  - Assess and re-assess patient's level of orientation  - Engage patient in 1 on 1 interactions, daily, for a minimum of 15 minutes   - Establish rapport/trust with patient   Outcome: Progressing     Problem: RESPIRATORY - ADULT  Goal: Achieves optimal ventilation and oxygenation  Description  INTERVENTIONS:  - Assess for changes in respiratory status  - Assess for changes in mentation and behavior  - Position to facilitate oxygenation and minimize respiratory effort  - Oxygen administration by appropriate delivery method based on oxygen saturation (per order) or ABGs  - Initiate smoking cessation education as indicated  - Encourage broncho-pulmonary hygiene including cough, deep breathe, Incentive Spirometry  - Assess the need for suctioning and aspirate as needed  - Assess and instruct to report SOB or any respiratory difficulty  - Respiratory Therapy support as indicated  Outcome: Progressing    8/16/19 Shift 7-3  Fall Precautions  BM 8/15, [address] 8/11   100% of breakfast, 25% of lunch   Groups: IMR  Compliant with routine medications and meals as well as routine blood work  Stated that she was not feeling well after staff encouraged her to tend to her hygiene  Pulse ox remains above 92%  Laying in bed most of the day even with much encouragement to get out of bed from staff  Expressed physical issues rather than her mental health, demonstrates poor insight regarding her recovery; rather remains preoccupied with physical COPD related symptoms  Currently laying in bed in her room, respirations unlabored and verbalizing no complaints of discomfort

## 2019-08-16 NOTE — PROGRESS NOTES
08/16/19 0900   Team Meeting   Meeting Type Daily Rounds   Team Members Present   Team Members Present Physician;Nurse;; Other (Discipline and Name)   Physician Team Member Dr Josh Mayfield Team Member Chau Hinton RN   Care Management Team Member Brenda Rodriguez   Other (Discipline and Name) Brena Elder, [place]; SHANIKA Mayo        08/16/19 0900   Team Meeting   Meeting Type Daily Rounds   Team Members Present   Team Members Present Physician;Nurse;; Other (Discipline and Name)   Physician Team Member Dr Josh Mayfield Team Member Chau Hinton RN   Care Management Team Member Brenda Rodriguez   Other (Discipline and Name) Brena Elder, [place]; SHANIKA Mayo     Refusing bloodwork this morning  Refusing to shower as well stating she is too tired  Attended IMR yesterday  Doing well with O2 at night  A little less somatic/needy with O2 and BS checks  Slept

## 2019-08-16 NOTE — CONSULTS
Inpatient consult for medical updates:    Paged by LYNDSEY MILAN  for elevated WBCs  Patient was seen and examined on the behavior bill  She denied any excessive coughing, sputum production, fever, chills, abdominal pain, nausea, vomiting, or diarrhea  She has also night any signs or symptoms of dysuria or hematuria  She did note that she feel somewhat short of breath with ambulation which resolves completely after resting  The patient has been mostly sedentary and refuses to do her daily ambulation  Spoke to the patient about the recorded allergy of Atrovent  Patient noted that she stop taking Atrovent after learning that she is allergic to peanuts and that there is peanut oil and Atrovent  Patient is afebrile, slightly tachycardic but no tachypnea, saturating more than 95% on room air  General:  Well-appearing  HEENT:  Very poor oral hygiene but no noted thrush  Pulmonary:  Clear to auscultation bilaterally with good airway movement  Cardiac:  S1-S2, tachycardia with no murmurs  Abdomen:  Positive bowel sounds, no tenderness to palpation    At the moment there is a concern for leukocytosis, 13 2, which has increased compared to her previous labs from 08/09/2019  The patient is currently afebrile with completely normal physical examination  There is no indication at the moment for antibiotics  Will repeat CBC, CMP on Monday  Tachycardia:  Most likely an adverse effect of theophylline  Will order a theophylline level  COPD:  Not in exacerbation will continue with Breo and theophylline, will add Spiriva  No current indication for further imaging or antibiotics  Will order theophylline level for Monday  Did provide the patient with incentive spirometry  Acquired hypothyroidism:  Has been on 125 mcg of levothyroxine  Will repeat TSH on Monday as well  Sedentary lifestyle:  Patient is at high risk for DVT    Highly encouraged the patient to increase her ambulation otherwise will start the patient on Lovenox if there is no improvement in ambulation  Did speak to the patient extensively that if she feel short of breath she can take a break and there is significant decrease in exercise tolerance, will consider increasing theophylline      Xavier Leung MD  08/16/19  4:19 PM

## 2019-08-16 NOTE — PROGRESS NOTES
Pharmacy Clozapine Monitoring Progress Note     Lizbeth Jhaveri is receiving 50 mg clozapine three times a day          Assessment/Plan:    Current phase of treatment is maintenance/continuation  **If clozapine therapy is interrupted for more than 48 hours, therapy MUST be restarted at the initial titration dose to avoid hypotension, bradycardia, and syncope  **    Patient is eligible to receive clozapine and the [address] [facility] monitoring frequency is weekly per clozapine REMS  The most recent [address] was   Lab Results   Component Value Date    NEUTROABS 10 30 (H) 08/16/2019    and the next lab is due on 8/23/19  Last Clozapine level (if available):    0   Lab Value Date/Time    CLOZAPINE 326 (L) 08/09/2019 1205     0   Lab Value Date/Time    NCLOZIP 191 08/09/2019 1205       Pharmacist Recommendations: The patient  does not need lab values ordered at this time  Based on the table below, pharmacy recommends the following: Assess CBC w/diff scheduled for 8/23/19  Monitoring: Adverse Effect Suggested Monitoring Patient's Status    Agranulocytosis ANC baseline and then repeat weekly for first 6 months and every 2 weeks for the second 6 months  Maintenance after one year of therapy every month  The most recent [address] was   Lab Results   Component Value Date    NEUTROABS 10 30 (H) 08/16/2019         This ANC is considered normal range (ANC >/= 1500/mcL)  Continue current treatment and monitoring course      Respiratory Depression Please ensure patient is not on concomitant respiratory lowering medications such as benzodiazepines, sedative hypnotics (eg  zolpidem) This patient is not on respiratory depression lowering medications   Myocarditis Baseline and weekly EKG, CRP, BNP, and troponin  Weekly symptomatic complaints of fatigue, dyspnea, tachycardia, chest pain, palpitations, and fever for the first 4 weeks    Last EKG Results on 8/13 showed:  No Noted Abnormalities    Last QTC on 8/13 was   0   Lab Value Date/Time    QTCINT 467 08/13/2019 1345         Last BNP was No results found for: NTBNP    Last Troponin was  0   Lab Value Date/Time    TROPONINI <0 01 05/01/2019 1318    TROPONINI <0 04 05/10/2015 1948        Orthostatic hypotension/  bradycardia Blood pressure and vital signs      Monitor blood pressure and orthostatic hypotension at least twice daily upon initiation and continue to follow at least once daily afterwards  /65 (BP Location: Left arm)   Pulse (!) 116   Temp 97 8 °F (36 6 °C) (Temporal)   Resp 18   Ht 5' 4" (1 626 m)   Wt 67 6 kg (149 lb)   SpO2 90%   BMI 25 58 kg/m²             Constipation (bowel obstruction) Assess at baseline and weekly during first four months of therapy  Docusate 100mg BID and Miralax 17 grams daily recommended initially  Prophylactic bowel regimen is not ordered  Sialorrhea Assess at baseline and weekly during first four months of therapy  May be managed using sublingual anticholinergic preparations (atropine 1% eye drops)  Patient was experiencing drooling per progress notes which has improved since splitting the total daily dose    This will continue to be monitored  Please note that per current REMS protocol the provider can submit a treatment rationale that justifies clozapine treatment even if patient parameters are outside of REMS recommendations  The Clozapine REMS is an FDA-mandated program implemented by the manufacturers of clozapine  ([employer]  com/CpmgClozapineUI/home  u)  Pharmacy will continue to follow patient with team, thank you    Electronically signed by: Darnell Sharp, Pharmacist

## 2019-08-16 NOTE — PROGRESS NOTES
Maggie at this time refused bloodwork    She is requesting that she will comply after eating breakfast

## 2019-08-17 PROCEDURE — 99232 SBSQ HOSP IP/OBS MODERATE 35: CPT | Performed by: NURSE PRACTITIONER

## 2019-08-17 RX ADMIN — THEOPHYLLINE ANHYDROUS 200 MG: 200 CAPSULE, EXTENDED RELEASE ORAL at 08:11

## 2019-08-17 RX ADMIN — FLUTICASONE FUROATE AND VILANTEROL TRIFENATATE 1 PUFF: 200; 25 POWDER RESPIRATORY (INHALATION) at 08:11

## 2019-08-17 RX ADMIN — FLUOXETINE 10 MG: 10 CAPSULE ORAL at 08:11

## 2019-08-17 RX ADMIN — PANTOPRAZOLE SODIUM 40 MG: 40 TABLET, DELAYED RELEASE ORAL at 06:16

## 2019-08-17 RX ADMIN — LEVOTHYROXINE SODIUM 125 MCG: 125 TABLET ORAL at 06:16

## 2019-08-17 RX ADMIN — TIOTROPIUM BROMIDE 18 MCG: 18 CAPSULE ORAL; RESPIRATORY (INHALATION) at 08:11

## 2019-08-17 RX ADMIN — CLOZAPINE 50 MG: 25 TABLET ORAL at 17:43

## 2019-08-17 RX ADMIN — CLOZAPINE 50 MG: 25 TABLET ORAL at 21:43

## 2019-08-17 RX ADMIN — CLOZAPINE 50 MG: 25 TABLET ORAL at 08:11

## 2019-08-17 NOTE — PLAN OF CARE
Problem: Alteration in Thoughts and Perception  Goal: Verbalize thoughts and feelings  Description  Interventions:  - Promote a nonjudgmental and trusting relationship with the patient through active listening and therapeutic communication  - Assess patient's level of functioning, behavior and potential for risk  - Engage patient in 1 on 1 interactions for a minimum of 15 minutes each session  - Encourage patient to express fears, feelings, frustrations, and discuss symptoms    - [place] patient to reality, help patient recognize reality-based thinking   - Administer medications as ordered and assess for potential side effects  - Provide the patient education related to the signs and symptoms of the illness and desired effects of prescribed medications  Outcome: Progressing  Goal: Agree to be compliant with medication regime, as prescribed and report medication side effects  Description  Interventions:  - Offer appropriate PRN medication and supervise ingestion; conduct aims, as needed   Outcome: Progressing  Goal: Complete daily ADLs, including personal hygiene independently, as able  Description  Interventions:  - Observe, teach, and assist patient with ADLS  - Monitor and promote a balance of rest/activity, with adequate nutrition and elimination   Outcome: Progressing     Problem: Depression  Goal: Verbalize thoughts and feelings  Description  Interventions:  - Assess and re-assess patient's level of risk   - Engage patient in 1:1 interactions, daily, for a minimum of 15 minutes   - Encourage patient to express feelings, fears, frustrations, hopes   Outcome: Not Progressing  Goal: Refrain from isolation  Description  Interventions:  - Develop a trusting relationship   - Encourage socialization   Outcome: Not Progressing    8/17/19 Shift 7-3  Fall Precautions  BM 8/15, [facility] 8/17   Appetite Intact   Groups: Cooking group   Incentive Spirometer teaching given but did not able to demonstrate proper use   She is less preoccupied today with her COPD related symptoms and somatic complaints and was more willing to stay out of bed more but still needs prompting to stay out of bed for longer periods of time; however she is more willing to get out of bed without complaint during meal and snack time but lacks motivation when other groups are being held  No complaints voiced at this time  Denies psych related symptoms continuing to demonstrate poor insight related to her diagnosis

## 2019-08-17 NOTE — PLAN OF CARE
Problem: PAIN - ADULT  Goal: Verbalizes/displays adequate comfort level or baseline comfort level  Description  Interventions:  - Encourage patient to monitor pain and request assistance  - Assess pain using appropriate pain scale  - Administer analgesics based on type and severity of pain and evaluate response  - Implement non-pharmacological measures as appropriate and evaluate response  - Consider cultural and social influences on pain and pain management  - Notify physician/advanced practitioner if interventions unsuccessful or patient reports new pain  Outcome: Progressing     Problem: SAFETY ADULT  Goal: Patient will remain free of falls  Description  INTERVENTIONS:  - Assess patient frequently for physical needs  -  Identify cognitive and physical deficits and behaviors that affect risk of falls    -  [place] fall precautions as indicated by assessment   - Educate patient/family on patient safety including physical limitations  - Instruct patient to call for assistance with activity based on assessment  - Modify environment to reduce risk of injury  - Consider OT/PT consult to assist with strengthening/mobility  Outcome: Progressing     Problem: RESPIRATORY - ADULT  Goal: Achieves optimal ventilation and oxygenation  Description  INTERVENTIONS:  - Assess for changes in respiratory status  - Assess for changes in mentation and behavior  - Position to facilitate oxygenation and minimize respiratory effort  - Oxygen administration by appropriate delivery method based on oxygen saturation (per order) or ABGs  - Initiate smoking cessation education as indicated  - Encourage broncho-pulmonary hygiene including cough, deep breathe, Incentive Spirometry  - Assess the need for suctioning and aspirate as needed  - Assess and instruct to report SOB or any respiratory difficulty  - Respiratory Therapy support as indicated  Outcome: Roby Khan maintained on ongoing fall and SAFE precaution without incident on this shift   Laying in bed with eyes closed, breath even and unlabored with O2@ 1L/M via nasal cannula   No sign or symptom of respiratory distress  Ana Maria Emily will continue to remain free from fall   Denies any pain or discomfort at this moment   Will continue to monitor behavior, pain and sleep pattern

## 2019-08-17 NOTE — PLAN OF CARE
Problem: Alteration in Thoughts and Perception  Goal: Agree to be compliant with medication regime, as prescribed and report medication side effects  Description  Interventions:  - Offer appropriate PRN medication and supervise ingestion; conduct aims, as needed   Outcome: Progressing     Problem: RESPIRATORY - ADULT  Goal: Achieves optimal ventilation and oxygenation  Description  INTERVENTIONS:  - Assess for changes in respiratory status  - Assess for changes in mentation and behavior  - Position to facilitate oxygenation and minimize respiratory effort  - Oxygen administration by appropriate delivery method based on oxygen saturation (per order) or ABGs  - Initiate smoking cessation education as indicated  - Encourage broncho-pulmonary hygiene including cough, deep breathe, Incentive Spirometry  - Assess the need for suctioning and aspirate as needed  - Assess and instruct to report SOB or any respiratory difficulty  - Respiratory Therapy support as indicated  Outcome: Progressing     Problem: Alteration in Thoughts and Perception  Goal: Attend and participate in unit activities, including therapeutic, recreational, and educational groups  Description  Interventions:  - Provide therapeutic and educational activities daily, encourage attendance and participation, and document same in the medical record   Outcome: Not Progressing  Goal: Complete daily ADLs, including personal hygiene independently, as able  Description  Interventions:  - Observe, teach, and assist patient with ADLS  - Monitor and promote a balance of rest/activity, with adequate nutrition and elimination   Outcome: Not Progressing     Problem: Ineffective Coping  Goal: Demonstrates healthy coping skills  Outcome: Not Progressing     Problem: Depression  Goal: Refrain from isolation  Description  Interventions:  - Develop a trusting relationship   - Encourage socialization   Outcome: Not Progressing     [address] continues to isolate in her room in bed w/exception of coming out for meal (ate 50%), for scheduled medicine, for HS snack  Did not attend to hygiene, did not attend Movie Social  Is pleasant upon approach & says recognizes the dangers of laying in bed (potential for pneumonia, blood clots), but, no move to do any laps  Did use the incentive spirometer when it was brought to her [facility] doing 5-10 respirations of 750ml each, but, has c/o feeling "tight" after using it  Presented to her that her muscles are not likely accustomed to that workout  Her PO2 sat prior to application of HS nasal O2 @ 1L was 92%

## 2019-08-17 NOTE — PROGRESS NOTES
Progress Note - Behavioral Health   Marguerite Angel 60 y o  female MRN: [id number]  [facility] Encounter: [id number]    The patient was seen for continuing care and reviewed with treatment team  Staff reports that the patient is calm, cooperative, and visible on the unit  Attending one group per day  Remains medication compliant  During assessment the patient was cooperative during assessment  Continues to remain somatic regarding health issues, but no longer appears to need her oxygen  Denies any thoughts to hurt herself or others  Denies any psychotic symptoms  Denies any adverse side effects to medications at this time and reports that she is eating and sleeping well  Mental Status Evaluation:  Appearance:  Marginal/poor hygiene   Behavior:  cooperative and guarded   Fund of knowledge  aware of current events and Aware of past history   Speech:   Language: Normal rate and Normal volume  No overt abnormality   Mood:  improving   Affect:   Associations: blunted  Intact   Thought Process:  Circumstantial   Thought Content:  Obsessive ruminations and Somatic delusions   Perceptual Disturbances: Denies hallucinations and does not appear to be responding to internal stimuli   Risk Potential: No suicidal or homicidal ideation   Orientation  Oriented x 3   Memory No deficits   Attention/Concentration attention span appeared shorter than expected for age   Insight:  limited   Judgment: Limited   Gait/Station: normal gait/station and normal balance   Motor Activity: No abnormal movement noted     Progress Toward Goals: improving    Assessment/Plan    Principal Problem:    Schizoaffective disorder, bipolar type (HCC)  Active Problems:    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[facility] [address] )    Acquired hypothyroidism    Gastroesophageal reflux disease without esophagitis      Recommended Treatment: Continue with pharmacotherapy, group therapy, milieu therapy and occupational therapy    The patient will be maintained on the following medications:    Current Facility-Administered Medications:  acetaminophen 650 mg Oral Q6H PRN Roberto Colorado MD   acetaminophen 650 mg Oral Q6H PRN Roberto Colorado MD   acetaminophen 650 mg Oral Q6H PRN Roberto Colorado MD   albuterol 2 puff Inhalation Q4H PRN Roberto Colorado MD   benzonatate 100 mg Oral TID PRN Roberto Colorado MD   cloZAPine 50 mg Oral TID Roberto Colorado MD   FLUoxetine 10 mg Oral Daily Roberto Colorado MD   fluticasone-vilanterol 1 puff Inhalation Daily Roberto Colorado MD   levothyroxine 125 mcg Oral Early Morning Roberto Colorado MD   OLANZapine 5 mg Oral Q8H PRN Roberto Colorado MD   pantoprazole 40 mg Oral Early Morning Roberto Colorado MD   theophylline 200 mg Oral Daily Roberto Colorado MD   tiotropium 18 mcg Inhalation Daily Maddy Love MD   traZODone 25 mg Oral HS PRN Roberto Colorado MD       Risks, benefits and possible side effects of Medications:   Risks, benefits, and possible side effects of medications explained to patient and patient verbalizes understanding

## 2019-08-18 PROCEDURE — 99232 SBSQ HOSP IP/OBS MODERATE 35: CPT | Performed by: NURSE PRACTITIONER

## 2019-08-18 RX ADMIN — FLUTICASONE FUROATE AND VILANTEROL TRIFENATATE 1 PUFF: 200; 25 POWDER RESPIRATORY (INHALATION) at 08:25

## 2019-08-18 RX ADMIN — CLOZAPINE 50 MG: 25 TABLET ORAL at 21:25

## 2019-08-18 RX ADMIN — CLOZAPINE 50 MG: 25 TABLET ORAL at 08:25

## 2019-08-18 RX ADMIN — TIOTROPIUM BROMIDE 18 MCG: 18 CAPSULE ORAL; RESPIRATORY (INHALATION) at 08:25

## 2019-08-18 RX ADMIN — LEVOTHYROXINE SODIUM 125 MCG: 125 TABLET ORAL at 06:17

## 2019-08-18 RX ADMIN — CLOZAPINE 50 MG: 25 TABLET ORAL at 17:39

## 2019-08-18 RX ADMIN — PANTOPRAZOLE SODIUM 40 MG: 40 TABLET, DELAYED RELEASE ORAL at 06:17

## 2019-08-18 RX ADMIN — THEOPHYLLINE ANHYDROUS 200 MG: 200 CAPSULE, EXTENDED RELEASE ORAL at 08:25

## 2019-08-18 RX ADMIN — FLUOXETINE 10 MG: 10 CAPSULE ORAL at 08:25

## 2019-08-18 NOTE — PLAN OF CARE
Problem: Alteration in Thoughts and Perception  Goal: Agree to be compliant with medication regime, as prescribed and report medication side effects  Description  Interventions:  - Offer appropriate PRN medication and supervise ingestion; conduct aims, as needed   Outcome: Progressing  Goal: Complete daily ADLs, including personal hygiene independently, as able  Description  Interventions:  - Observe, teach, and assist patient with ADLS  - Monitor and promote a balance of rest/activity, with adequate nutrition and elimination   Outcome: Progressing     Problem: RESPIRATORY - ADULT  Goal: Achieves optimal ventilation and oxygenation  Description  INTERVENTIONS:  - Assess for changes in respiratory status  - Assess for changes in mentation and behavior  - Position to facilitate oxygenation and minimize respiratory effort  - Oxygen administration by appropriate delivery method based on oxygen saturation (per order) or ABGs  - Initiate smoking cessation education as indicated  - Encourage broncho-pulmonary hygiene including cough, deep breathe, Incentive Spirometry  - Assess the need for suctioning and aspirate as needed  - Assess and instruct to report SOB or any respiratory difficulty  - Respiratory Therapy support as indicated  Outcome: Progressing     Problem: Depression  Goal: Refrain from isolation  Description  Interventions:  - Develop a trusting relationship   - Encourage socialization   Outcome: Progressing     [address] began shift crumpled under sheet in bed  But, when approached, has been willing to do the [facility] [place] incentive spirometry, averaging 900-1000 ml, 5-6 breaths each time  She has come out to eat meal (100%), get her supper medicine  She has also done 2 walks so far down [place] & back to get & return toothbrush, paste & hair comb from hygiene bin  Was assisted to comb out & braid hair, did scrub teeth, rinse  Was interested in shaving her chin; supervised to do so for safety   Rather pleased w/her accomplishments, but, c/o feeling "tired"  Sitting out now in living room resting once became aware it is open except meals & after hours

## 2019-08-18 NOTE — PLAN OF CARE
Problem: Alteration in Thoughts and Perception  Goal: Agree to be compliant with medication regime, as prescribed and report medication side effects  Description  Interventions:  - Offer appropriate PRN medication and supervise ingestion; conduct aims, as needed   Outcome: Progressing     Problem: RESPIRATORY - ADULT  Goal: Achieves optimal ventilation and oxygenation  Description  INTERVENTIONS:  - Assess for changes in respiratory status  - Assess for changes in mentation and behavior  - Position to facilitate oxygenation and minimize respiratory effort  - Oxygen administration by appropriate delivery method based on oxygen saturation (per order) or ABGs  - Initiate smoking cessation education as indicated  - Encourage broncho-pulmonary hygiene including cough, deep breathe, Incentive Spirometry  - Assess the need for suctioning and aspirate as needed  - Assess and instruct to report SOB or any respiratory difficulty  - Respiratory Therapy support as indicated  Outcome: Progressing     Problem: Alteration in Thoughts and Perception  Goal: Attend and participate in unit activities, including therapeutic, recreational, and educational groups  Description  Interventions:  - Provide therapeutic and educational activities daily, encourage attendance and participation, and document same in the medical record   Outcome: Not Progressing     Problem: Depression  Goal: Refrain from isolation  Description  Interventions:  - Develop a trusting relationship   - Encourage socialization   Outcome: Not Progressing     2150 Radu Helms was visited by her sister early in shift w/warm interactions between them  She credits herself two additional walks today; out to meet sister/return to room & down the [place] for hygiene bin on 7-3  She has used the incentive spirometer faithfully Q1H when it was brought to her, averaging 5-6 breaths between 750-1000ml volume  Continues to worry about her respirations   "I don't think I'm getting enough breath " PO2's tonight have been 92 & 93%  Declined meal as was full from treats her sister brought  Did come out for scheduled medicines  Did not attend evening programming  Did have an HS snack  Agrees to work on her walk laps tomorrow evening as aware the Medical doctor is returning to check her progress Monday

## 2019-08-18 NOTE — PLAN OF CARE
Problem: RESPIRATORY - ADULT  Goal: Achieves optimal ventilation and oxygenation  Description  INTERVENTIONS:  - Assess for changes in respiratory status  - Assess for changes in mentation and behavior  - Position to facilitate oxygenation and minimize respiratory effort  - Oxygen administration by appropriate delivery method based on oxygen saturation (per order) or ABGs  - Initiate smoking cessation education as indicated  - Encourage broncho-pulmonary hygiene including cough, deep breathe, Incentive Spirometry  - Assess the need for suctioning and aspirate as needed  - Assess and instruct to report SOB or any respiratory difficulty  - Respiratory Therapy support as indicated  Outcome: Israel Lino has not had any issues or behaviors  She has been asleep since the beginning of the shift  Respirations easy and non labored  Oxygen 2 liters nasal cannula while sleeping  No signs of respiratory distress  Continue to monitor  Precautions maintained

## 2019-08-18 NOTE — PLAN OF CARE
Problem: Alteration in Thoughts and Perception  Goal: Verbalize thoughts and feelings  Description  Interventions:  - Promote a nonjudgmental and trusting relationship with the patient through active listening and therapeutic communication  - Assess patient's level of functioning, behavior and potential for risk  - Engage patient in 1 on 1 interactions for a minimum of 15 minutes each session  - Encourage patient to express fears, feelings, frustrations, and discuss symptoms    - [place] patient to reality, help patient recognize reality-based thinking   - Administer medications as ordered and assess for potential side effects  - Provide the patient education related to the signs and symptoms of the illness and desired effects of prescribed medications  Outcome: Progressing  Goal: Agree to be compliant with medication regime, as prescribed and report medication side effects  Description  Interventions:  - Offer appropriate PRN medication and supervise ingestion; conduct aims, as needed   Outcome: Progressing  Goal: Attend and participate in unit activities, including therapeutic, recreational, and educational groups  Description  Interventions:  - Provide therapeutic and educational activities daily, encourage attendance and participation, and document same in the medical record   Outcome: Not Progressing     Problem: Ineffective Coping  Goal: Identifies healthy coping skills  Outcome: Not Progressing     Problem: Depression  Goal: Refrain from isolation  Description  Interventions:  - Develop a trusting relationship   - Encourage socialization   Outcome: Not Progressing     Problem: Alteration in Orientation  Goal: Interact with staff daily  Description  Interventions:  - Assess and re-assess patient's level of orientation  - Engage patient in 1 on 1 interactions, daily, for a minimum of 15 minutes   - Establish rapport/trust with patient   Outcome: Progressing    8/18/19 Shift 7-3  Fall Precautions  BM 8/18, SH 8/17   Appetite Intact   Groups: none  Declined using the incentive spirometer and complying with her oral care after using her morning breathing medications  Remained in bed for the majority of the shift stating that she felt too sick to do laps, teaching reinforced about the use of incentive spirometer, proper oral care, and remaining up and out of bed  She however ambulates promptly for her scheduled meals but retreats to her room after  Declines feeling anxious and remains focused on her somatic complaints  Oxygen saturation remains above 93% throughout the shift

## 2019-08-18 NOTE — PROGRESS NOTES
Progress Note - Behavioral Health   Madison Miner 60 y o  female MRN: [id number]  Unit/Bed#: [facility] 110-02 Encounter: [id number]    The patient was seen for continuing care and reviewed with treatment team  Staff reports that the patient continues to remain self isolating to room  Attending groups and comes out of bed for all meals  Remains medication compliant  Hygiene has improved    During assessment the patient remains somatic and irritable at times regarding care  Denies any thoughts to hurt herself or others  Denies any adverse side effects to medications  Remains hyper-focused on breathing, however, no distress or sob was noted during the assessment  Patient continues to appear paranoid at times but does not express any delusional thoughts   No current medication changes    Mental Status Evaluation:  Appearance:  Adequate hygiene and grooming   Behavior:  cooperative and guarded; somatic   Fund of knowledge  aware of current events and Aware of past history   Speech:   Language: Normal rate and Normal volume  No overt abnormality   Mood:  improving   Affect:   Associations: blunted  Intact   Thought Process:  Circumstantial   Thought Content:  Paranoid and mistrustful, Obsessive ruminations and Somatic delusions   Perceptual Disturbances: Denies hallucinations and does not appear to be responding to internal stimuli   Risk Potential: No suicidal or homicidal ideation   Orientation  Oriented x 3   Memory No deficits   Attention/Concentration attention span appeared shorter than expected for age   Insight:  limited   Judgment: Limited   Gait/Station: normal gait/station and normal balance   Motor Activity: No abnormal movement noted     Progress Toward Goals: improving    Assessment/Plan    Principal Problem:    Schizoaffective disorder, bipolar type ([facility] )  Active Problems:    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[facility] )    Acquired hypothyroidism    Gastroesophageal reflux disease without esophagitis      Recommended Treatment: Continue with pharmacotherapy, group therapy, milieu therapy and occupational therapy  The patient will be maintained on the following medications:    Current Facility-Administered Medications:  acetaminophen 650 mg Oral Q6H PRN Isidoro Gonzalez MD   acetaminophen 650 mg Oral Q6H PRN Isidoro Gonzalez MD   acetaminophen 650 mg Oral Q6H PRN Isidoro Gonzalez MD   albuterol 2 puff Inhalation Q4H PRN Isidoro Gonzalez MD   benzonatate 100 mg Oral TID PRN Isidoro Gonzalez MD   cloZAPine 50 mg Oral TID Isidoro Gonzalez MD   FLUoxetine 10 mg Oral Daily Isidoro Gonzalez MD   fluticasone-vilanterol 1 puff Inhalation Daily Isidoro Gonzalez MD   levothyroxine 125 mcg Oral Early Morning Isidoro Gonzalez MD   OLANZapine 5 mg Oral Q8H PRN Isidoro Gonzalez MD   pantoprazole 40 mg Oral Early Morning Isidoro Gonzalez MD   theophylline 200 mg Oral Daily Isidoro Gonzalez MD   tiotropium 18 mcg Inhalation Daily Mirtha Clemons MD   traZODone 25 mg Oral HS PRN Isidoro Gonzalez MD       Risks, benefits and possible side effects of Medications:   Risks, benefits, and possible side effects of medications explained to patient and patient verbalizes understanding

## 2019-08-19 LAB
BASOPHILS # BLD AUTO: 0.1 THOUSANDS/ΜL (ref 0–0.1)
BASOPHILS NFR BLD AUTO: 1 % (ref 0–1)
EOSINOPHIL # BLD AUTO: 0.2 THOUSAND/ΜL (ref 0–0.4)
EOSINOPHIL NFR BLD AUTO: 2 % (ref 0–6)
ERYTHROCYTE [DISTWIDTH] IN BLOOD BY AUTOMATED COUNT: 15.3 %
HCT VFR BLD AUTO: 38.8 % (ref 36–46)
HGB BLD-MCNC: 13 G/DL (ref 12–16)
LYMPHOCYTES # BLD AUTO: 3.1 THOUSANDS/ΜL (ref 0.5–4)
LYMPHOCYTES NFR BLD AUTO: 40 % (ref 25–45)
MCH RBC QN AUTO: 30.5 PG (ref 26–34)
MCHC RBC AUTO-ENTMCNC: 33.4 G/DL (ref 31–36)
MCV RBC AUTO: 91 FL (ref 80–100)
MONOCYTES # BLD AUTO: 0.7 THOUSAND/ΜL (ref 0.2–0.9)
MONOCYTES NFR BLD AUTO: 10 % (ref 1–10)
NEUTROPHILS # BLD AUTO: 3.6 THOUSANDS/ΜL (ref 1.8–7.8)
NEUTS SEG NFR BLD AUTO: 47 % (ref 45–65)
PLATELET # BLD AUTO: 262 THOUSANDS/UL (ref 150–450)
PLATELET BLD QL SMEAR: ADEQUATE
PMV BLD AUTO: 9.5 FL (ref 8.9–12.7)
RBC # BLD AUTO: 4.24 MILLION/UL (ref 4–5.2)
RBC MORPH BLD: NORMAL
THEOPHYLLINE SERPL-MCNC: 3.3 UG/ML (ref 10–20)
WBC # BLD AUTO: 7.7 THOUSAND/UL (ref 4.5–11)

## 2019-08-19 PROCEDURE — 80198 ASSAY OF THEOPHYLLINE: CPT | Performed by: FAMILY MEDICINE

## 2019-08-19 PROCEDURE — 99232 SBSQ HOSP IP/OBS MODERATE 35: CPT | Performed by: PSYCHIATRY & NEUROLOGY

## 2019-08-19 PROCEDURE — 85025 COMPLETE CBC W/AUTO DIFF WBC: CPT | Performed by: FAMILY MEDICINE

## 2019-08-19 RX ADMIN — LEVOTHYROXINE SODIUM 125 MCG: 125 TABLET ORAL at 05:37

## 2019-08-19 RX ADMIN — CLOZAPINE 50 MG: 25 TABLET ORAL at 08:56

## 2019-08-19 RX ADMIN — PANTOPRAZOLE SODIUM 40 MG: 40 TABLET, DELAYED RELEASE ORAL at 05:37

## 2019-08-19 RX ADMIN — CLOZAPINE 50 MG: 25 TABLET ORAL at 17:42

## 2019-08-19 RX ADMIN — THEOPHYLLINE ANHYDROUS 200 MG: 200 CAPSULE, EXTENDED RELEASE ORAL at 08:56

## 2019-08-19 RX ADMIN — FLUOXETINE 10 MG: 10 CAPSULE ORAL at 08:56

## 2019-08-19 RX ADMIN — FLUTICASONE FUROATE AND VILANTEROL TRIFENATATE 1 PUFF: 200; 25 POWDER RESPIRATORY (INHALATION) at 08:57

## 2019-08-19 RX ADMIN — CLOZAPINE 50 MG: 25 TABLET ORAL at 21:32

## 2019-08-19 RX ADMIN — TIOTROPIUM BROMIDE 18 MCG: 18 CAPSULE ORAL; RESPIRATORY (INHALATION) at 08:56

## 2019-08-19 NOTE — PROGRESS NOTES
2130 Sania Higginbotham, w/encouragement, has continued to be productive this shift  In lieu of PM Group she walked to w/nurse to dining room, filled out her Self Assessment of Progress in Recovery for Treatment Team along w/her Goals card  Did have some panicky moments; "My agoraphobia  I can't handle wide open spaces ([place])", but, persevered w/encouragement & reality credit that is being successful tonight, no ill effects from her efforts  Did have an HS snack, came up for HS medicine  Wearing her QHS nasal O2 @ 1L now for bedtime

## 2019-08-19 NOTE — PROGRESS NOTES
08/19/19 0900   Team Meeting   Meeting Type Daily Rounds   Team Members Present   Team Members Present Physician;Nurse;; Other (Discipline and Name)   Physician Team Member Dr Carolee Donald Team Member Tal Bignham RN   Care Management Team Member Brenda Ahn   Other (Discipline and Name) Anabela Ji [place]; SHANIKA Higuera     Still focused and preoccupied with her oxygen  Reports she is suffering from "agoraphobia"  Sister visited over the weekend  Slept

## 2019-08-19 NOTE — ASSESSMENT & PLAN NOTE
Psychiatry Progress Note    Subjective: Interval History     Patient is now doing better, wearing regular clothing as opposed to hospital clothing, had a high WBC last Friday and was checked out by medical and repeat WBC today is back to normal,   Again attending only sporadic groups only up to 19%  and is still focused and preoccupied about her breathing difficulty despite the fact that she is breathing fine and her pulse ox has been acceptable so far  She feels slightly better since the clozapine was switched around but since she is on Prozac we need to check the clozapine level to see if it is very high causing her symptoms and results are still pending  Her vital signs have been stable so far  She still complains about occasional drooling which has lessened with the splitting of the dosage of Clozaril  She has chosen not to to use the portable oxygen to get out of bed and go to groups if necessary and wants to have special accomodations to take showers now  She continues to talk in a monotonous, stilted and rambling manner as usual as if talking in a court of law which has not changed  She does not admit to any overt hallucinations or paranoia but  still appears to harbor some covert  paranoia off and on as she does not always trust certain nursing staff giving her the medications and still believes the inhaler atrovent has peanut oil in it     She  still points to the lipoma on her and claims that it is a micro chip implanted on and this appears to be a fixed delusion  She has been sleeping well and has been using the nasal oxygen at night  No current suicidal homicidal thoughts intent or plans reported  She is casually dressed fairly groomed but is guarded suspicious evasive and in  denial of her illness as usual   No overt hallucinations or other delusions reported other than some underlying paranoid delusions and beliefs that there is a micro chip implanted on her left forearm     No signs or sxs of agranulocytosis or myocarditis or endocarditis and she is moving her bowels so far with no issues     Staff has been encouraging to accept her medications and attend more groups as scheduled and work on her recovery and is now tolerating the the clozapine as 3 times a day    Current medications:    Current Facility-Administered Medications:     acetaminophen (TYLENOL) tablet 650 mg, 650 mg, Oral, Q6H PRN, Dalton Garner MD    acetaminophen (TYLENOL) tablet 650 mg, 650 mg, Oral, Q6H PRN, Dalton Garner MD    acetaminophen (TYLENOL) tablet 650 mg, 650 mg, Oral, Q6H PRN, Dalton Garner MD    albuterol (PROVENTIL HFA,VENTOLIN HFA) inhaler 2 puff, 2 puff, Inhalation, Q4H PRN, Dalton Garner MD, 2 puff at 07/25/19 1200    benzonatate (TESSALON PERLES) capsule 100 mg, 100 mg, Oral, TID PRN, Dalton Garner MD    cloZAPine (CLOZARIL) tablet 50 mg, 50 mg, Oral, TID, Dalton Garner MD, 50 mg at 08/19/19 0856    FLUoxetine (PROzac) capsule 10 mg, 10 mg, Oral, Daily, Dalton Garner MD, 10 mg at 08/19/19 0856    fluticasone-vilanterol (BREO ELLIPTA) 200-25 MCG/INH inhaler 1 puff, 1 puff, Inhalation, Daily, Dalton Garner MD, 1 puff at 08/19/19 0857    levothyroxine tablet 125 mcg, 125 mcg, Oral, Early Morning, Dalton Garner MD, 125 mcg at 08/19/19 0537    OLANZapine (ZyPREXA) tablet 5 mg, 5 mg, Oral, Q8H PRN, Dalton Garner MD    pantoprazole (PROTONIX) EC tablet 40 mg, 40 mg, Oral, Early Morning, Dalton Garner MD, 40 mg at 08/19/19 0537    theophylline (JEF-24) 24 hr capsule 200 mg, 200 mg, Oral, Daily, Dalton Garner MD, 200 mg at 08/19/19 0856    tiotropium (SPIRIVA) capsule for inhaler 18 mcg, 18 mcg, Inhalation, Daily, Mimi Edgar MD, 18 mcg at 08/19/19 0856    traZODone (DESYREL) tablet 25 mg, 25 mg, Oral, HS PRN, Dalton Garner MD  Justification if on more than two antipsychotics:  Only on his clozapine  Side effects if any:  None    Risks , benefits, side effects and precautions of medications discussed with patient and reminded patient to let us know any problems with medications     Objective:     Vital Signs:  Vitals:    08/18/19 0700 08/18/19 0705 08/18/19 2130 08/19/19 0700   BP: 108/68 98/64  128/72   BP Location: Left arm Left arm  Right arm   Pulse: (!) 110 (!) 113  84   Resp: 17 17 19   Temp: (!) 97 3 °F (36 3 °C)   (!) 97 4 °F (36 3 °C)   TempSrc: Temporal      SpO2:   94%    Weight: 67 6 kg (149 lb)      Height:         Appearance:  age appropriate, casually dressed and overweight older than stated age   Behavior:  deviant, evasive and guarded and suspicious but with good eye contact   Speech:  normal pitch and normal volume but tends to become loud at times   Mood:  anxious and dysthymic   Affect:  mood-congruent   Thought Process:  goal directed and illogical slightly pressured but able to be redirected and talks as if in a court of low being stilted   Thought Content:  delusions  grandiose and somatic about not being able to breathe despite being on nasal oxygen and paranoid about people out to harm her and Atrovent inhaler tainteded with peanut oil  No current suicidal homicidal thoughts intent or plans reported  No phobias obsessions or compulsions reported   Perceptual Disturbances: None and does not appear responding to internal stimuli   Risk Potential: Tendency to not care for herself if she goes off treatment   Sensorium:  person, place, time/date, situation, day of week, month of year and time   Cognition:  grossly intact   Consciousness:  alert and awake    Attention: Intact concentration and attention span   Intellect: Considered to be at least of average intelligence   Insight:  limited in denial   Judgment: poor      Motor Activity: no abnormal movements         Recent Labs:  Results Reviewed     None          I/O Past 24 hours:  No intake/output data recorded  No intake/output data recorded          Assessment / Plan:     Schizoaffective disorder, bipolar type [facility])      Reason for continued inpatient care: Because of underlying paranoia and refusal to go back to the personal care home and inability to care for herself on her own  Acceptance by patient:  Accepting  [place] in recovery:  About living in another personal care home once she feels better  Understanding of medications :  Has some understanding  Involved in reintegration process:  Adjusting to the unit  Trusting in relatoinship with psychiatrist:  Trusting    Recommended Treatment:    Medication changes:  1) none today  Non-pharmacological treatments  1) start individual therapy group therapy, milieu therapy and occupational therapy and milieu therapy involving multidisciplinary team approach with psychotherapist, case management, nursing, peer support services, art therapist etc using recovery principles and psycho-education about accepting illness and need for treatment   Safety  1) Safety/communication plan established targeting dynamic risk factors above  Discharge Plan most likely back to the a:  Personal care boarding home with act services once her delusions are managed and mood becomes more stabilized and she becomes more receptive to treatment compliance    Counseling / Coordination of Care    Total floor / unit time spent today 15 minutes  Greater than 50% of total time was spent with the patient and / or family counseling and / or coordination of care  A description of the counseling / coordination of care  Patient's Rights, confidentiality and exceptions to confidentiality, use of automated medical record, [facility] staff access to medical record, and consent to treatment reviewed      Krystyna Mcclain MD

## 2019-08-19 NOTE — PROGRESS NOTES
Progress Note - Efraín Sizer 1955, 60 y o  female MRN: [id number]    [facility] Encounter: [id number]    Primary Care Provider: Deny Garcia MD   Date and time admitted to hospital: 7/23/2019  5:30 PM        * Schizoaffective disorder, bipolar type [facility])  Assessment & Plan  Psychiatry Progress Note    Subjective: Interval History     Patient is now doing better, wearing regular clothing as opposed to hospital clothing, had a high WBC last Friday and was checked out by medical and repeat WBC today is back to normal,   Again attending only sporadic groups only up to 19%  and is still focused and preoccupied about her breathing difficulty despite the fact that she is breathing fine and her pulse ox has been acceptable so far  She feels slightly better since the clozapine was switched around but since she is on Prozac we need to check the clozapine level to see if it is very high causing her symptoms and results are still pending  Her vital signs have been stable so far  She still complains about occasional drooling which has lessened with the splitting of the dosage of Clozaril  She has chosen not to to use the portable oxygen to get out of bed and go to groups if necessary and wants to have special accomodations to take showers now  She continues to talk in a monotonous, stilted and rambling manner as usual as if talking in a court of law which has not changed  She does not admit to any overt hallucinations or paranoia but  still appears to harbor some covert  paranoia off and on as she does not always trust certain nursing staff giving her the medications and still believes the inhaler atrovent has peanut oil in it     She  still points to the lipoma on her and claims that it is a micro chip implanted on and this appears to be a fixed delusion  She has been sleeping well and has been using the nasal oxygen at night  No current suicidal homicidal thoughts intent or plans reported    She is casually dressed fairly groomed but is guarded suspicious evasive and in  denial of her illness as usual   No overt hallucinations or other delusions reported other than some underlying paranoid delusions and beliefs that there is a micro chip implanted on her left forearm   No signs or sxs of agranulocytosis or myocarditis or endocarditis and she is moving her bowels so far with no issues     Staff has been encouraging to accept her medications and attend more groups as scheduled and work on her recovery and is now tolerating the the clozapine as 3 times a day    Current medications:    Current Facility-Administered Medications:     acetaminophen (TYLENOL) tablet 650 mg, 650 mg, Oral, Q6H PRN, Meldon Curling, MD    acetaminophen (TYLENOL) tablet 650 mg, 650 mg, Oral, Q6H PRN, Meldon Curling, MD    acetaminophen (TYLENOL) tablet 650 mg, 650 mg, Oral, Q6H PRN, Meldon Curling, MD    albuterol (PROVENTIL HFA,VENTOLIN HFA) inhaler 2 puff, 2 puff, Inhalation, Q4H PRN, Meldon Curling, MD, 2 puff at 07/25/19 1200    benzonatate (TESSALON PERLES) capsule 100 mg, 100 mg, Oral, TID PRN, Meldon Curling, MD    cloZAPine (CLOZARIL) tablet 50 mg, 50 mg, Oral, TID, Meldon Curling, MD, 50 mg at 08/19/19 0856    FLUoxetine (PROzac) capsule 10 mg, 10 mg, Oral, Daily, Meldon Curling, MD, 10 mg at 08/19/19 0856    fluticasone-vilanterol (BREO ELLIPTA) 200-25 MCG/INH inhaler 1 puff, 1 puff, Inhalation, Daily, Meldon Curling, MD, 1 puff at 08/19/19 0857    levothyroxine tablet 125 mcg, 125 mcg, Oral, Early Morning, Meldon Curling, MD, 125 mcg at 08/19/19 0537    OLANZapine (ZyPREXA) tablet 5 mg, 5 mg, Oral, Q8H PRN, Meldon Curling, MD    pantoprazole (PROTONIX) EC tablet 40 mg, 40 mg, Oral, Early Morning, Meldon Curling, MD, 40 mg at 08/19/19 0537    theophylline (JEF-24) 24 hr capsule 200 mg, 200 mg, Oral, Daily, Meldon Curling, MD, 200 mg at 08/19/19 0856    tiotropium (SPIRIVA) capsule for inhaler 18 mcg, 18 mcg, Inhalation, Daily, Darius Suárez MD, 18 mcg at 08/19/19 0856    traZODone (DESYREL) tablet 25 mg, 25 mg, Oral, HS PRN, Meredith Wesley MD  Justification if on more than two antipsychotics:  Only on his clozapine  Side effects if any:  None    Risks , benefits, side effects and precautions of medications discussed with patient and reminded patient to let us know any problems with medications     Objective:     Vital Signs:  Vitals:    08/18/19 0700 08/18/19 0705 08/18/19 2130 08/19/19 0700   BP: 108/68 98/64  128/72   BP Location: Left arm Left arm  Right arm   Pulse: (!) 110 (!) 113  84   Resp: 17 17 19   Temp: (!) 97 3 °F (36 3 °C)   (!) 97 4 °F (36 3 °C)   TempSrc: Temporal      SpO2:   94%    Weight: 67 6 kg (149 lb)      Height:         Appearance:  age appropriate, casually dressed and overweight older than stated age   Behavior:  deviant, evasive and guarded and suspicious but with good eye contact   Speech:  normal pitch and normal volume but tends to become loud at times   Mood:  anxious and dysthymic   Affect:  mood-congruent   Thought Process:  goal directed and illogical slightly pressured but able to be redirected and talks as if in a court of low being stilted   Thought Content:  delusions  grandiose and somatic about not being able to breathe despite being on nasal oxygen and paranoid about people out to harm her and Atrovent inhaler tainteded with peanut oil  No current suicidal homicidal thoughts intent or plans reported    No phobias obsessions or compulsions reported   Perceptual Disturbances: None and does not appear responding to internal stimuli   Risk Potential: Tendency to not care for herself if she goes off treatment   Sensorium:  person, place, time/date, situation, day of week, month of year and time   Cognition:  grossly intact   Consciousness:  alert and awake    Attention: Intact concentration and attention span   Intellect: Considered to be at least of average intelligence   Insight:  limited in denial   Judgment: poor Motor Activity: no abnormal movements         Recent Labs:  Results Reviewed     None          I/O Past 24 hours:  No intake/output data recorded  No intake/output data recorded  Assessment / Plan:     Schizoaffective disorder, bipolar type ([facility] [address] )      Reason for continued inpatient care:  Because of underlying paranoia and refusal to go back to the personal care home and inability to care for herself on her own  Acceptance by patient:  Accepting  Rene Peacock in recovery:  About living in another personal care home once she feels better  Understanding of medications :  Has some understanding  Involved in reintegration process:  Adjusting to the unit  Trusting in relatoinship with psychiatrist:  Trusting    Recommended Treatment:    Medication changes:  1) none today  Non-pharmacological treatments  1) start individual therapy group therapy, milieu therapy and occupational therapy and milieu therapy involving multidisciplinary team approach with psychotherapist, case management, nursing, peer support services, art therapist etc using recovery principles and psycho-education about accepting illness and need for treatment   Safety  1) Safety/communication plan established targeting dynamic risk factors above  Discharge Plan most likely back to the a:  Personal care boarding home with act services once her delusions are managed and mood becomes more stabilized and she becomes more receptive to treatment compliance    Counseling / Coordination of Care    Total floor / unit time spent today 15 minutes  Greater than 50% of total time was spent with the patient and / or family counseling and / or coordination of care  A description of the counseling / coordination of care  Patient's Rights, confidentiality and exceptions to confidentiality, use of automated medical record, [facility] staff access to medical record, and consent to treatment reviewed      Sandhya Bolaños MD

## 2019-08-19 NOTE — PLAN OF CARE
Problem: Alteration in Thoughts and Perception  Goal: Verbalize thoughts and feelings  Description  Interventions:  - Promote a nonjudgmental and trusting relationship with the patient through active listening and therapeutic communication  - Assess patient's level of functioning, behavior and potential for risk  - Engage patient in 1 on 1 interactions for a minimum of 15 minutes each session  - Encourage patient to express fears, feelings, frustrations, and discuss symptoms    - [place] patient to reality, help patient recognize reality-based thinking   - Administer medications as ordered and assess for potential side effects  - Provide the patient education related to the signs and symptoms of the illness and desired effects of prescribed medications  Outcome: Not Progressing  Goal: Agree to be compliant with medication regime, as prescribed and report medication side effects  Description  Interventions:  - Offer appropriate PRN medication and supervise ingestion; conduct aims, as needed   Outcome: Progressing  Goal: Attend and participate in unit activities, including therapeutic, recreational, and educational groups  Description  Interventions:  - Provide therapeutic and educational activities daily, encourage attendance and participation, and document same in the medical record   Outcome: Not Progressing  Goal: Complete daily ADLs, including personal hygiene independently, as able  Description  Interventions:  - Observe, teach, and assist patient with ADLS  - Monitor and promote a balance of rest/activity, with adequate nutrition and elimination   Outcome: Not Progressing     Problem: Ineffective Coping  Goal: Demonstrates healthy coping skills  Outcome: Not Progressing  Goal: Participates in unit activities  Description  Interventions:  - Provide therapeutic environment   - Provide required programming   - Redirect inappropriate behaviors   Outcome: Not Progressing     Problem: Depression  Goal: Refrain from isolation  Description  Interventions:  - Develop a trusting relationship   - Encourage socialization   Outcome: Not Progressing     Problem: Alteration in Orientation  Goal: Interact with staff daily  Description  Interventions:  - Assess and re-assess patient's level of orientation  - Engage patient in 1 on 1 interactions, daily, for a minimum of 15 minutes   - Establish rapport/trust with patient   Outcome: Progressing     Problem: RESPIRATORY - ADULT  Goal: Achieves optimal ventilation and oxygenation  Description  INTERVENTIONS:  - Assess for changes in respiratory status  - Assess for changes in mentation and behavior  - Position to facilitate oxygenation and minimize respiratory effort  - Oxygen administration by appropriate delivery method based on oxygen saturation (per order) or ABGs  - Initiate smoking cessation education as indicated  - Encourage broncho-pulmonary hygiene including cough, deep breathe, Incentive Spirometry  - Assess the need for suctioning and aspirate as needed  - Assess and instruct to report SOB or any respiratory difficulty  - Respiratory Therapy support as indicated  Outcome: Progressing    8/19/19 Shift 7-3  Fall Precautions   8/18, [facility] 8/17   Appetite 100% of meals   Groups: treatment team  Accepted using the incentive spirometer, using it once per hour with prompting reaching 1000 mL on the incentive spirometer and but is still not complying with her oral care after using her morning breathing medications  Voiced complaints about feeling tired when ambulating but did not report episodes of dizziness and she was not able to specify what symptoms she was experiencing when ambulating  Reinforced oral care  She however ambulates promptly for her scheduled meals but retreats to her room after  Declines feeling anxious and remains focused on her somatic complaints  Oxygen saturation remains above 93% throughout the shift    Unable to get labs for her theophyllin levels, will attempt tomorrow  Must be drawn two hours after medication administration  Will continue to monitor

## 2019-08-19 NOTE — PLAN OF CARE
Problem: Alteration in Thoughts and Perception  Goal: Agree to be compliant with medication regime, as prescribed and report medication side effects  Description  Interventions:  - Offer appropriate PRN medication and supervise ingestion; conduct aims, as needed   Outcome: Progressing    During treatment team meeting, individual appears to be less resistive to working on recovery and accepting of her illness  She continues to complain of excessive tiredness related to Clozaril, but reports that she is tolerating it better since it has been divided into 3 smaller doses

## 2019-08-19 NOTE — PROGRESS NOTES
08/19/19 1000   Team Meeting   Meeting Type Tx Team Meeting   Initial Conference Date 08/19/19   Next Conference Date 08/26/19   Team Members Present   Team Members Present Physician;Nurse;; Other (Discipline and Name)   Physician Team Member Dr Celina Nageotte Team Member Tony vargas, RN   Care Management Team Member Brenda Muro   Other (Discipline and Name) Yudi [place] - Therapist; Dorothey Boeck, [address] - Certified peer; Reshma Sheffield [facility] REYES   Patient/Family Present   Patient Present Yes   Patient's Family Present No     Patient attended treatment team meeting this morning  Proudly prepared with self-assessment  Patient's group attendance for last week was 19%  She was encouraged to attend at least 50% of groups  Patient showed some insight into her symptoms, stating that "sometimes" they can be psychological   She was acknowledged for same  She also advocated for a new shower chair and an eye doctor appointment which will be discussed further with nursing and case management  Patient reports she was visiting by her sister and brother over the weekend and the visit went well  Team and patient completed risk assessment and the patient did not verbalize any desire to elope from the program   Patient verbalized understanding of consequences of eloping from treatment while on a commitment  Patient verbalized no further questions or concerns at the conclusion of the meeting  Next team meeting scheduled for 8/26

## 2019-08-20 LAB
CLOZAPINE SERPL-MCNC: 324 NG/ML (ref 350–650)
CLOZAPINE+NOR SERPL-MCNC: 531 NG/ML
NORCLOZAPINE SERPL-MCNC: 207 NG/ML

## 2019-08-20 PROCEDURE — 99232 SBSQ HOSP IP/OBS MODERATE 35: CPT | Performed by: PSYCHIATRY & NEUROLOGY

## 2019-08-20 RX ADMIN — TIOTROPIUM BROMIDE 18 MCG: 18 CAPSULE ORAL; RESPIRATORY (INHALATION) at 08:14

## 2019-08-20 RX ADMIN — CLOZAPINE 50 MG: 25 TABLET ORAL at 21:30

## 2019-08-20 RX ADMIN — CLOZAPINE 50 MG: 25 TABLET ORAL at 08:10

## 2019-08-20 RX ADMIN — FLUOXETINE 10 MG: 10 CAPSULE ORAL at 08:10

## 2019-08-20 RX ADMIN — CLOZAPINE 50 MG: 25 TABLET ORAL at 17:18

## 2019-08-20 RX ADMIN — FLUTICASONE FUROATE AND VILANTEROL TRIFENATATE 1 PUFF: 200; 25 POWDER RESPIRATORY (INHALATION) at 08:14

## 2019-08-20 RX ADMIN — PANTOPRAZOLE SODIUM 40 MG: 40 TABLET, DELAYED RELEASE ORAL at 05:35

## 2019-08-20 RX ADMIN — THEOPHYLLINE ANHYDROUS 200 MG: 200 CAPSULE, EXTENDED RELEASE ORAL at 08:10

## 2019-08-20 RX ADMIN — LEVOTHYROXINE SODIUM 125 MCG: 125 TABLET ORAL at 05:35

## 2019-08-20 NOTE — PROGRESS NOTES
08/20/19 0800   Team Meeting   Meeting Type Daily Rounds   Team Members Present   Team Members Present Physician;Nurse; Other (Discipline and Name)   Physician Team Member Dr Peewee Santizo Team Member Neymar Chatman RN   Other (Discipline and Name) Ita Joyce [place]; Denis Goddard CPS     Reports feeling panic at times  Is not going to all groups  Slept well

## 2019-08-20 NOTE — PLAN OF CARE
Problem: Alteration in Thoughts and Perception  Goal: Agree to be compliant with medication regime, as prescribed and report medication side effects  Description  Interventions:  - Offer appropriate PRN medication and supervise ingestion; conduct aims, as needed   Outcome: Progressing  Goal: Recognize dysfunctional thoughts, communicate reality-based thoughts at the time of discharge  Description  Interventions:  - Provide medication and psycho-education to assist patient in compliance and developing insight into his/her illness   Outcome: Progressing  Goal: Complete daily ADLs, including personal hygiene independently, as able  Description  Interventions:  - Observe, teach, and assist patient with ADLS  - Monitor and promote a balance of rest/activity, with adequate nutrition and elimination   Outcome: Progressing     Problem: RESPIRATORY - ADULT  Goal: Achieves optimal ventilation and oxygenation  Description  INTERVENTIONS:  - Assess for changes in respiratory status  - Assess for changes in mentation and behavior  - Position to facilitate oxygenation and minimize respiratory effort  - Oxygen administration by appropriate delivery method based on oxygen saturation (per order) or ABGs  - Initiate smoking cessation education as indicated  - Encourage broncho-pulmonary hygiene including cough, deep breathe, Incentive Spirometry  - Assess the need for suctioning and aspirate as needed  - Assess and instruct to report SOB or any respiratory difficulty  - Respiratory Therapy support as indicated  Outcome: Progressing     Problem: Alteration in Thoughts and Perception  Goal: Attend and participate in unit activities, including therapeutic, recreational, and educational groups  Description  Interventions:  - Provide therapeutic and educational activities daily, encourage attendance and participation, and document same in the medical record   Outcome: Not Progressing     Problem: Depression  Goal: Refrain from isolation  Description  Interventions:  - Develop a trusting relationship   - Encourage socialization   Outcome: Not Progressing     [address] has been amenable to maintaining a higher level of activity, w/support & encouragement from staff  She again walked twice to & from utility room where hygiene bins kept to obtain, then, return toothbrush/toothpaste & comb after use  (Scrubbed teeth after meal, but, didn't tackle re-combing , re-braiding hair ) She also accompanied staff to view the handicap shower room to see if it is workable for her to use  She continues to raise objections (type of shower chair, lack of shower wand, lack of non-slip floor decals) which she aired w/staff  Staff will continue to work w/her to accommodate needs  Came out of room to eat 50% of meal  *Does not like the offered entree's & wishes to speak to a dietician about alternatives  She has come to window for scheduled medicines  Did not attend PM Group  Had an HS snack  Has been dutiful about using the incentive spirometer Q1H [place], 5-6 breaths of 900-1000ml  Does panic & perceive gait as unsteady  "I can't walk right " Is not unsteady  Was instructed to use the handrail  Once in room reflected, "It's psychological " Again attributes to her fear of open spaces  Mulls over best living disposition upon discharge  Was encouraged to bring this up in Treatment Team, discuss w/  Was pleased w/positive reception in Treatment Team today for efforts to date  Is now wearing her QHS nasal O2 @ 1L

## 2019-08-20 NOTE — PLAN OF CARE
Problem: PAIN - ADULT  Goal: Verbalizes/displays adequate comfort level or baseline comfort level  Description  Interventions:  - Encourage patient to monitor pain and request assistance  - Assess pain using appropriate pain scale  - Administer analgesics based on type and severity of pain and evaluate response  - Implement non-pharmacological measures as appropriate and evaluate response  - Consider cultural and social influences on pain and pain management  - Notify physician/advanced practitioner if interventions unsuccessful or patient reports new pain  Outcome: Progressing     Problem: SAFETY ADULT  Goal: Patient will remain free of falls  Description  INTERVENTIONS:  - Assess patient frequently for physical needs  -  Identify cognitive and physical deficits and behaviors that affect risk of falls    -  [place] fall precautions as indicated by assessment   - Educate patient/family on patient safety including physical limitations  - Instruct patient to call for assistance with activity based on assessment  - Modify environment to reduce risk of injury  - Consider OT/PT consult to assist with strengthening/mobility  Outcome: Progressing     Problem: RESPIRATORY - ADULT  Goal: Achieves optimal ventilation and oxygenation  Description  INTERVENTIONS:  - Assess for changes in respiratory status  - Assess for changes in mentation and behavior  - Position to facilitate oxygenation and minimize respiratory effort  - Oxygen administration by appropriate delivery method based on oxygen saturation (per order) or ABGs  - Initiate smoking cessation education as indicated  - Encourage broncho-pulmonary hygiene including cough, deep breathe, Incentive Spirometry  - Assess the need for suctioning and aspirate as needed  - Assess and instruct to report SOB or any respiratory difficulty  - Respiratory Therapy support as indicated  Outcome: [place] maintained on ongoing fall and SAFE precaution without incident on this shift   Laying in bed with eyes closed, breath even and unlabored with O2@ 1L/M via nasal cannula   No sign or symptom of respiratory distress  Loida Harrington will continue to remain free from fall   Denies any pain or discomfort at this moment   Will continue to monitor behavior, pain and sleep pattern

## 2019-08-20 NOTE — PLAN OF CARE
Problem: Alteration in Thoughts and Perception  Goal: Verbalize thoughts and feelings  Description  Interventions:  - Promote a nonjudgmental and trusting relationship with the patient through active listening and therapeutic communication  - Assess patient's level of functioning, behavior and potential for risk  - Engage patient in 1 on 1 interactions for a minimum of 15 minutes each session  - Encourage patient to express fears, feelings, frustrations, and discuss symptoms    - [place] patient to reality, help patient recognize reality-based thinking   - Administer medications as ordered and assess for potential side effects  - Provide the patient education related to the signs and symptoms of the illness and desired effects of prescribed medications  Outcome: Progressing  Goal: Agree to be compliant with medication regime, as prescribed and report medication side effects  Description  Interventions:  - Offer appropriate PRN medication and supervise ingestion; conduct aims, as needed   Outcome: Progressing  Goal: Complete daily ADLs, including personal hygiene independently, as able  Description  Interventions:  - Observe, teach, and assist patient with ADLS  - Monitor and promote a balance of rest/activity, with adequate nutrition and elimination   Outcome: Progressing     Problem: Risk for Self Injury/Neglect  Goal: Refrain from harming self  Description  Interventions:  - Monitor patient closely, per order  - Develop a trusting relationship  - Supervise medication ingestion, monitor effects and side effects   Outcome: Progressing  Goal: Attend and participate in unit activities, including therapeutic, recreational, and educational groups  Description  Interventions:  - Provide therapeutic and educational activities daily, encourage attendance and participation, and document same in the medical record  - Obtain collateral information, encourage visitation and family involvement in care   Outcome: Progressing Problem: Depression  Goal: Refrain from isolation  Description  Interventions:  - Develop a trusting relationship   - Encourage socialization   Outcome: Progressing  Goal: Refrain from self-neglect  Outcome: Progressing     Problem: SAFETY ADULT  Goal: Patient will remain free of falls  Description  INTERVENTIONS:  - Assess patient frequently for physical needs  -  Identify cognitive and physical deficits and behaviors that affect risk of falls    -  [place] fall precautions as indicated by assessment   - Educate patient/family on patient safety including physical limitations  - Instruct patient to call for assistance with activity based on assessment  - Modify environment to reduce risk of injury  - Consider OT/PT consult to assist with strengthening/mobility  Outcome: Progressing     Appetite 75% of meals, attended IMR group  Encouraged oral care after using inhalers, did not require prompting for routine medications, more visible today, activity and fluids encouraged, anxiety controlled, no somatic complaints today other than fatigue, preoccupied with lab results, reassured by nursing staff that both Dr Terence Navarrete and Dr Juventino Hooper reviewed her recent blood work again today, routine follow up labs have been rescheduled for Friday

## 2019-08-20 NOTE — PROGRESS NOTES
Progress Note - Annamarie Eng 1955, 60 y o  female MRN: [id number]    Unit/Bed#: [facility] 081-64 Encounter: [id number]    Primary Care Provider: Bailey Perez MD   Date and time admitted to hospital: 7/23/2019  5:30 PM        * Schizoaffective disorder, bipolar type [facility])  Assessment & Plan  Psychiatry Progress Note    Subjective: Interval History     Patient ii again attending only sporadic groups  and is still focused and preoccupied about her breathing difficulty despite the fact that she is breathing fine and her pulse ox has been acceptable so far  She is now complaining about the shower despite being offered the handicapped shower room  She feels slightly better since the clozapine was switched around but since she is on Prozac we did check the clozapine level which is now back as 324 way below range  Her vital signs have been stable so far  She still complains about occasional drooling which has lessened with the splitting of the dosage of Clozaril  She has chosen not to to use the portable oxygen to get out of bed and go to groups if necessary and wants to have special accomodations to take showers now  She continues to talk in a monotonous, stilted and rambling manner as usual as if talking in a court of law which has not changed since she came to the unit  She does not admit to any overt hallucinations or paranoia but still appears to harbor some covert  paranoia off and on as she does not always trust certain nursing staff giving her the medications  She continues to believe that  the inhaler atrovent has peanut oil in it  She  still points to the lipoma on her and claims that it is a micro chip implanted on and this appears to be a fixed delusion  She is casually dressed fairly groomed but is guarded suspicious evasive and in  denial of her illness as usual  She has been sleeping well and has been using the nasal oxygen at night   No current suicidal homicidal thoughts intent or plans reported  No overt hallucinations or other delusions reported other than some underlying paranoid delusions and beliefs that there is a micro chip implanted on her left forearm   No signs or sxs of agranulocytosis or myocarditis or endocarditis and she is moving her bowels so far with no issues     Staff has been encouraging to accept her medications and attend more groups as scheduled and work on her recovery and is now tolerating the the clozapine as 3 times a day    Current medications:    Current Facility-Administered Medications:     acetaminophen (TYLENOL) tablet 650 mg, 650 mg, Oral, Q6H PRN, Meredith Wesley MD    acetaminophen (TYLENOL) tablet 650 mg, 650 mg, Oral, Q6H PRN, Meredith Wesley MD    acetaminophen (TYLENOL) tablet 650 mg, 650 mg, Oral, Q6H PRN, Meredith Wesley MD    albuterol (PROVENTIL HFA,VENTOLIN HFA) inhaler 2 puff, 2 puff, Inhalation, Q4H PRN, Meredith Wesley MD, 2 puff at 07/25/19 1200    benzonatate (TESSALON PERLES) capsule 100 mg, 100 mg, Oral, TID PRN, Meredith Wesley MD    cloZAPine (CLOZARIL) tablet 50 mg, 50 mg, Oral, TID, Meredith Wesley MD, 50 mg at 08/20/19 0810    FLUoxetine (PROzac) capsule 10 mg, 10 mg, Oral, Daily, Meredith Wesley MD, 10 mg at 08/20/19 0810    fluticasone-vilanterol (BREO ELLIPTA) 200-25 MCG/INH inhaler 1 puff, 1 puff, Inhalation, Daily, Meredith Wesley MD, 1 puff at 08/20/19 0814    levothyroxine tablet 125 mcg, 125 mcg, Oral, Early Morning, Meredith Wesley MD, 125 mcg at 08/20/19 0535    OLANZapine (ZyPREXA) tablet 5 mg, 5 mg, Oral, Q8H PRN, Meredith Wesley MD    pantoprazole (PROTONIX) EC tablet 40 mg, 40 mg, Oral, Early Morning, Meredith Wesley MD, 40 mg at 08/20/19 0535    theophylline (JEF-24) 24 hr capsule 200 mg, 200 mg, Oral, Daily, Meredith Wesley MD, 200 mg at 08/20/19 0810    tiotropium (SPIRIVA) capsule for inhaler 18 mcg, 18 mcg, Inhalation, Daily, Michael Jarrell MD, 18 mcg at 08/20/19 0814    traZODone (DESYREL) tablet 25 mg, 25 mg, Oral, HS PRN, Meredith Wesley, MD  Justification if on more than two antipsychotics:  Only on his clozapine  Side effects if any:  None    Risks , benefits, side effects and precautions of medications discussed with patient and reminded patient to let us know any problems with medications     Objective:     Vital Signs:  Vitals:    08/18/19 0705 08/18/19 2130 08/19/19 0700 08/19/19 2135   BP: 98/64  128/72    BP Location: Left arm  Right arm    Pulse: (!) 113  84    Resp: 17  19    Temp:   (!) 97 4 °F (36 3 °C)    TempSrc:       SpO2:  94%  95%   Weight:       Height:         Appearance:  age appropriate, casually dressed and overweight older than stated age   Behavior:  deviant, evasive and guarded and suspicious but with good eye contact   Speech:  normal pitch and normal volume but tends to become loud at times   Mood:  anxious and dysthymic   Affect:  mood-congruent   Thought Process:  goal directed and illogical slightly pressured but able to be redirected and talks as if in a court of low being stilted   Thought Content:  delusions  grandiose and somatic about not being able to breathe despite being on nasal oxygen and paranoid about people out to harm her and Atrovent inhaler tainteded with peanut oil  No current suicidal homicidal thoughts intent or plans reported  No phobias obsessions or compulsions reported   Perceptual Disturbances: None and does not appear responding to internal stimuli   Risk Potential: Tendency to not care for herself if she goes off treatment   Sensorium:  person, place, time/date, situation, day of week, month of year and time   Cognition:  grossly intact   Consciousness:  alert and awake    Attention: Intact concentration and attention span   Intellect: Considered to be at least of average intelligence   Insight:  limited in denial   Judgment: poor      Motor Activity: no abnormal movements         Recent Labs:  Results Reviewed     None          I/O Past 24 hours:  No intake/output data recorded    No intake/output data recorded  Assessment / Plan:     Schizoaffective disorder, bipolar type ([facility] [address] )      Reason for continued inpatient care:  Because of underlying paranoia and refusal to go back to the personal care home and inability to care for herself on her own  Acceptance by patient:  Accepting  Clarisa Mcgregor in recovery:  About living in another personal care home once she feels better  Understanding of medications :  Has some understanding  Involved in reintegration process:  Adjusting to the unit  Trusting in relatoinship with psychiatrist:  Trusting    Recommended Treatment:    Medication changes:  1) none today  Non-pharmacological treatments  1) start individual therapy group therapy, milieu therapy and occupational therapy and milieu therapy involving multidisciplinary team approach with psychotherapist, case management, nursing, peer support services, art therapist etc using recovery principles and psycho-education about accepting illness and need for treatment   Safety  1) Safety/communication plan established targeting dynamic risk factors above  Discharge Plan most likely back to the a:  Personal care boarding home with act services once her delusions are managed and mood becomes more stabilized and she becomes more receptive to treatment compliance    Counseling / Coordination of Care    Total floor / unit time spent today 15 minutes  Greater than 50% of total time was spent with the patient and / or family counseling and / or coordination of care  A description of the counseling / coordination of care  Patient's Rights, confidentiality and exceptions to confidentiality, use of automated medical record, [facility] staff access to medical record, and consent to treatment reviewed      Jeffrey Gallegos MD

## 2019-08-20 NOTE — PROGRESS NOTES
Spoke with Dr Kusum Huggins, labs reviewed and chart reviewed,TSH and CMP moved to Friday, will draw 8/23 with weekly CBC for clozaril monitoring, will continue to encouraged activity, fluids and oral care

## 2019-08-20 NOTE — ASSESSMENT & PLAN NOTE
Psychiatry Progress Note    Subjective: Interval History     Patient ii again attending only sporadic groups  and is still focused and preoccupied about her breathing difficulty despite the fact that she is breathing fine and her pulse ox has been acceptable so far  She is now complaining about the shower despite being offered the handicapped shower room  She feels slightly better since the clozapine was switched around but since she is on Prozac we did check the clozapine level which is now back as 324 way below range  Her vital signs have been stable so far  She still complains about occasional drooling which has lessened with the splitting of the dosage of Clozaril  She has chosen not to to use the portable oxygen to get out of bed and go to groups if necessary and wants to have special accomodations to take showers now  She continues to talk in a monotonous, stilted and rambling manner as usual as if talking in a court of law which has not changed since she came to the unit  She does not admit to any overt hallucinations or paranoia but still appears to harbor some covert  paranoia off and on as she does not always trust certain nursing staff giving her the medications  She continues to believe that  the inhaler atrovent has peanut oil in it  She  still points to the lipoma on her and claims that it is a micro chip implanted on and this appears to be a fixed delusion  She is casually dressed fairly groomed but is guarded suspicious evasive and in  denial of her illness as usual  She has been sleeping well and has been using the nasal oxygen at night  No current suicidal homicidal thoughts intent or plans reported  No overt hallucinations or other delusions reported other than some underlying paranoid delusions and beliefs that there is a micro chip implanted on her left forearm   No signs or sxs of agranulocytosis or myocarditis or endocarditis and she is moving her bowels so far with no issues     Staff has been encouraging to accept her medications and attend more groups as scheduled and work on her recovery and is now tolerating the the clozapine as 3 times a day    Current medications:    Current Facility-Administered Medications:     acetaminophen (TYLENOL) tablet 650 mg, 650 mg, Oral, Q6H PRN, Amina Aparicio MD    acetaminophen (TYLENOL) tablet 650 mg, 650 mg, Oral, Q6H PRN, Amina Aparicio MD    acetaminophen (TYLENOL) tablet 650 mg, 650 mg, Oral, Q6H PRN, Amina Aparicio MD    albuterol (PROVENTIL HFA,VENTOLIN HFA) inhaler 2 puff, 2 puff, Inhalation, Q4H PRN, Amina Aparicio MD, 2 puff at 07/25/19 1200    benzonatate (TESSALON PERLES) capsule 100 mg, 100 mg, Oral, TID PRN, Amina Aparicio MD    cloZAPine (CLOZARIL) tablet 50 mg, 50 mg, Oral, TID, Amina Aparicio MD, 50 mg at 08/20/19 0810    FLUoxetine (PROzac) capsule 10 mg, 10 mg, Oral, Daily, Amina Aparicio MD, 10 mg at 08/20/19 0810    fluticasone-vilanterol (BREO ELLIPTA) 200-25 MCG/INH inhaler 1 puff, 1 puff, Inhalation, Daily, Amina Aparicio MD, 1 puff at 08/20/19 0814    levothyroxine tablet 125 mcg, 125 mcg, Oral, Early Morning, Amina Aparicio MD, 125 mcg at 08/20/19 0535    OLANZapine (ZyPREXA) tablet 5 mg, 5 mg, Oral, Q8H PRN, Amina Aparicio MD    pantoprazole (PROTONIX) EC tablet 40 mg, 40 mg, Oral, Early Morning, Amina Aparicio MD, 40 mg at 08/20/19 0535    theophylline (JEF-24) 24 hr capsule 200 mg, 200 mg, Oral, Daily, Amina Aparicio MD, 200 mg at 08/20/19 0810    tiotropium (SPIRIVA) capsule for inhaler 18 mcg, 18 mcg, Inhalation, Daily, Feliciano Brown MD, 18 mcg at 08/20/19 7367    traZODone (DESYREL) tablet 25 mg, 25 mg, Oral, HS PRN, Amina Aparicio MD  Justification if on more than two antipsychotics:  Only on his clozapine  Side effects if any:  None    Risks , benefits, side effects and precautions of medications discussed with patient and reminded patient to let us know any problems with medications     Objective:     Vital Signs:  Vitals:    08/18/19 5077 08/18/19 2130 08/19/19 0700 08/19/19 2135   BP: 98/64  128/72    BP Location: Left arm  Right arm    Pulse: (!) 113  84    Resp: 17  19    Temp:   (!) 97 4 °F (36 3 °C)    TempSrc:       SpO2:  94%  95%   Weight:       Height:         Appearance:  age appropriate, casually dressed and overweight older than stated age   Behavior:  deviant, evasive and guarded and suspicious but with good eye contact   Speech:  normal pitch and normal volume but tends to become loud at times   Mood:  anxious and dysthymic   Affect:  mood-congruent   Thought Process:  goal directed and illogical slightly pressured but able to be redirected and talks as if in a court of low being stilted   Thought Content:  delusions  grandiose and somatic about not being able to breathe despite being on nasal oxygen and paranoid about people out to harm her and Atrovent inhaler tainteded with peanut oil  No current suicidal homicidal thoughts intent or plans reported  No phobias obsessions or compulsions reported   Perceptual Disturbances: None and does not appear responding to internal stimuli   Risk Potential: Tendency to not care for herself if she goes off treatment   Sensorium:  person, place, time/date, situation, day of week, month of year and time   Cognition:  grossly intact   Consciousness:  alert and awake    Attention: Intact concentration and attention span   Intellect: Considered to be at least of average intelligence   Insight:  limited in denial   Judgment: poor      Motor Activity: no abnormal movements         Recent Labs:  Results Reviewed     None          I/O Past 24 hours:  No intake/output data recorded  No intake/output data recorded          Assessment / Plan:     [facility]      Reason for continued inpatient care:  Because of underlying paranoia and refusal to go back to the personal care home and inability to care for herself on her own  Acceptance by patient:  Accepting  Hopefulness in recovery:  About living in another personal care home once she feels better  Understanding of medications :  Has some understanding  Involved in reintegration process:  Adjusting to the unit  Trusting in relatoinship with psychiatrist:  Trusting    Recommended Treatment:    Medication changes:  1) none today  Non-pharmacological treatments  1) start individual therapy group therapy, milieu therapy and occupational therapy and milieu therapy involving multidisciplinary team approach with psychotherapist, case management, nursing, peer support services, art therapist etc using recovery principles and psycho-education about accepting illness and need for treatment   Safety  1) Safety/communication plan established targeting dynamic risk factors above  Discharge Plan most likely back to the a:  Personal care boarding home with act services once her delusions are managed and mood becomes more stabilized and she becomes more receptive to treatment compliance    Counseling / Coordination of Care    Total floor / unit time spent today 15 minutes  Greater than 50% of total time was spent with the patient and / or family counseling and / or coordination of care  A description of the counseling / coordination of care  Patient's Rights, confidentiality and exceptions to confidentiality, use of automated medical record, [facility] staff access to medical record, and consent to treatment reviewed      Prakash Brewster MD

## 2019-08-21 ENCOUNTER — APPOINTMENT (INPATIENT)
Dept: RADIOLOGY | Facility: HOSPITAL | Age: 64
DRG: 750 | End: 2019-08-21
Payer: COMMERCIAL

## 2019-08-21 LAB
ALBUMIN SERPL BCP-MCNC: 3.7 G/DL (ref 3–5.2)
ALP SERPL-CCNC: 105 U/L (ref 43–122)
ALT SERPL W P-5'-P-CCNC: 11 U/L (ref 9–52)
ANION GAP SERPL CALCULATED.3IONS-SCNC: 7 MMOL/L (ref 5–14)
AST SERPL W P-5'-P-CCNC: 19 U/L (ref 14–36)
BACTERIA UR QL AUTO: ABNORMAL /HPF
BILIRUB SERPL-MCNC: 0.6 MG/DL
BILIRUB UR QL STRIP: NEGATIVE
BUN SERPL-MCNC: 17 MG/DL (ref 5–25)
CALCIUM SERPL-MCNC: 9.5 MG/DL (ref 8.4–10.2)
CHLORIDE SERPL-SCNC: 103 MMOL/L (ref 97–108)
CLARITY UR: CLEAR
CO2 SERPL-SCNC: 28 MMOL/L (ref 22–30)
COLOR UR: ABNORMAL
CREAT SERPL-MCNC: 0.68 MG/DL (ref 0.6–1.2)
ERYTHROCYTE [DISTWIDTH] IN BLOOD BY AUTOMATED COUNT: 15.2 %
GFR SERPL CREATININE-BSD FRML MDRD: 94 ML/MIN/1.73SQ M
GLUCOSE SERPL-MCNC: 114 MG/DL (ref 70–99)
GLUCOSE UR STRIP-MCNC: NEGATIVE MG/DL
HCT VFR BLD AUTO: 41 % (ref 36–46)
HGB BLD-MCNC: 13.6 G/DL (ref 12–16)
HGB UR QL STRIP.AUTO: NEGATIVE
KETONES UR STRIP-MCNC: NEGATIVE MG/DL
LEUKOCYTE ESTERASE UR QL STRIP: 25
LYMPHOCYTES # BLD AUTO: 1.56 THOUSAND/UL (ref 0.5–4)
LYMPHOCYTES # BLD AUTO: 8 % (ref 25–45)
MCH RBC QN AUTO: 30.4 PG (ref 26–34)
MCHC RBC AUTO-ENTMCNC: 33.3 G/DL (ref 31–36)
MCV RBC AUTO: 91 FL (ref 80–100)
MONOCYTES # BLD AUTO: 0.78 THOUSAND/UL (ref 0.2–0.9)
MONOCYTES NFR BLD AUTO: 4 % (ref 1–10)
MUCOUS THREADS UR QL AUTO: ABNORMAL
NEUTS BAND NFR BLD MANUAL: 29 % (ref 0–8)
NEUTS SEG # BLD: 17.16 THOUSAND/UL (ref 1.8–7.8)
NEUTS SEG NFR BLD AUTO: 59 %
NITRITE UR QL STRIP: NEGATIVE
NON-SQ EPI CELLS URNS QL MICRO: ABNORMAL /HPF
PH UR STRIP.AUTO: 8 [PH]
PLATELET # BLD AUTO: 283 THOUSANDS/UL (ref 150–450)
PLATELET BLD QL SMEAR: ADEQUATE
PMV BLD AUTO: 8.9 FL (ref 8.9–12.7)
POTASSIUM SERPL-SCNC: 4.3 MMOL/L (ref 3.6–5)
PROT SERPL-MCNC: 6.9 G/DL (ref 5.9–8.4)
PROT UR STRIP-MCNC: NEGATIVE MG/DL
RBC # BLD AUTO: 4.48 MILLION/UL (ref 4–5.2)
RBC #/AREA URNS AUTO: ABNORMAL /HPF
RBC MORPH BLD: NORMAL
SODIUM SERPL-SCNC: 138 MMOL/L (ref 137–147)
SP GR UR STRIP.AUTO: 1.01 (ref 1–1.04)
TOTAL CELLS COUNTED SPEC: 100
TSH SERPL DL<=0.05 MIU/L-ACNC: 0.98 UIU/ML (ref 0.47–4.68)
UROBILINOGEN UA: 1 MG/DL
WBC # BLD AUTO: 19.5 THOUSAND/UL (ref 4.5–11)
WBC #/AREA URNS AUTO: ABNORMAL /HPF

## 2019-08-21 PROCEDURE — 93005 ELECTROCARDIOGRAM TRACING: CPT

## 2019-08-21 PROCEDURE — 99232 SBSQ HOSP IP/OBS MODERATE 35: CPT | Performed by: PSYCHIATRY & NEUROLOGY

## 2019-08-21 PROCEDURE — 85027 COMPLETE CBC AUTOMATED: CPT | Performed by: PSYCHIATRY & NEUROLOGY

## 2019-08-21 PROCEDURE — 81003 URINALYSIS AUTO W/O SCOPE: CPT | Performed by: PSYCHIATRY & NEUROLOGY

## 2019-08-21 PROCEDURE — 71046 X-RAY EXAM CHEST 2 VIEWS: CPT

## 2019-08-21 PROCEDURE — 81001 URINALYSIS AUTO W/SCOPE: CPT | Performed by: PSYCHIATRY & NEUROLOGY

## 2019-08-21 PROCEDURE — 80053 COMPREHEN METABOLIC PANEL: CPT | Performed by: PSYCHIATRY & NEUROLOGY

## 2019-08-21 PROCEDURE — 85007 BL SMEAR W/DIFF WBC COUNT: CPT | Performed by: PSYCHIATRY & NEUROLOGY

## 2019-08-21 PROCEDURE — 84443 ASSAY THYROID STIM HORMONE: CPT | Performed by: PSYCHIATRY & NEUROLOGY

## 2019-08-21 RX ADMIN — FLUTICASONE FUROATE AND VILANTEROL TRIFENATATE 1 PUFF: 200; 25 POWDER RESPIRATORY (INHALATION) at 09:13

## 2019-08-21 RX ADMIN — CLOZAPINE 50 MG: 25 TABLET ORAL at 18:00

## 2019-08-21 RX ADMIN — CLOZAPINE 50 MG: 25 TABLET ORAL at 21:33

## 2019-08-21 RX ADMIN — THEOPHYLLINE ANHYDROUS 200 MG: 200 CAPSULE, EXTENDED RELEASE ORAL at 09:18

## 2019-08-21 RX ADMIN — CLOZAPINE 50 MG: 25 TABLET ORAL at 09:18

## 2019-08-21 RX ADMIN — PANTOPRAZOLE SODIUM 40 MG: 40 TABLET, DELAYED RELEASE ORAL at 05:56

## 2019-08-21 RX ADMIN — TIOTROPIUM BROMIDE 18 MCG: 18 CAPSULE ORAL; RESPIRATORY (INHALATION) at 09:11

## 2019-08-21 RX ADMIN — FLUOXETINE 10 MG: 10 CAPSULE ORAL at 09:18

## 2019-08-21 RX ADMIN — LEVOTHYROXINE SODIUM 125 MCG: 125 TABLET ORAL at 05:55

## 2019-08-21 NOTE — PROGRESS NOTES
Progress Note - Veronica Romero 1955, 60 y o  female MRN: [id number]    Unit/Bed#: [facility] [address] Encounter: [id number]    Primary Care Provider: Pavel Aragon MD   Date and time admitted to hospital: 7/23/2019  5:30 PM        * Schizoaffective disorder, bipolar type [facility])  Assessment & Plan  Psychiatry Progress Note    Subjective: Interval History     Patient did have a slight increase in temp to 100 and was reporting that she was she wearing last night and was feeling weak  Therefore some stat blood work was ordered to make sure that she is not having any acute infections including a urine analysis  She is breathing fine with no VS breathing difficulties  He has  again attending only sporadic groups  and is still focused and preoccupied about her breathing difficulty despite the fact that she is breathing fine and her pulse ox has been acceptable so far to around 90%  She is now complaining about the shower despite being offered the handicapped shower room  She feels slightly better since the clozapine was switched around but since she is on Prozac we did check the clozapine level which is now back as 324 way below toxic range  She still complains about occasional drooling which has lessened with the splitting of the dosage of Clozaril  She has chosen not to to use the portable oxygen to get out of bed and go to groups if necessary and wants to have special accomodations to take showers now  She continues to talk in a  Stilted, rambling manner and monotonous manner as usual as if talking in a court of law which has not changed since she came to the unit  She does not admit to any overt hallucinations or paranoia but still appears to harbor some covert  paranoia off and on  She still does not always trust certain nursing staff giving her the medications  She continues to believe that  the inhaler atrovent has peanut oil in it    She  still points to the lipoma on her and claims that it is a micro chip implanted on and this appears to be a fixed delusion and wound is not willing to change her beliefe about the same  She is guarded suspicious evasive and in  denial of her illness as usual  No current suicidal homicidal thoughts intent or plans reported  No overt hallucinations or other delusions reported   No signs or sxs of agranulocytosis or myocarditis or endocarditis and she is moving her bowels so far with no issues     Staff has been encouraging to accept her medications and attend more groups as scheduled and work on her recovery and is now tolerating the the clozapine as 3 times a day so far   Current medications:    Current Facility-Administered Medications:     acetaminophen (TYLENOL) tablet 650 mg, 650 mg, Oral, Q6H PRN, Roberto Colorado MD    acetaminophen (TYLENOL) tablet 650 mg, 650 mg, Oral, Q6H PRN, Roberto Colorado MD    acetaminophen (TYLENOL) tablet 650 mg, 650 mg, Oral, Q6H PRN, Roberto Colorado MD    albuterol (PROVENTIL HFA,VENTOLIN HFA) inhaler 2 puff, 2 puff, Inhalation, Q4H PRN, Roberto Colorado MD, 2 puff at 07/25/19 1200    benzonatate (TESSALON PERLES) capsule 100 mg, 100 mg, Oral, TID PRN, Roberto Colorado MD    cloZAPine (CLOZARIL) tablet 50 mg, 50 mg, Oral, TID, Roberto Colorado MD, 50 mg at 08/21/19 1592    FLUoxetine (PROzac) capsule 10 mg, 10 mg, Oral, Daily, Roberto Colorado MD, 10 mg at 08/21/19 0918    fluticasone-vilanterol (BREO ELLIPTA) 200-25 MCG/INH inhaler 1 puff, 1 puff, Inhalation, Daily, Roberto Colorado MD, 1 puff at 08/21/19 0913    levothyroxine tablet 125 mcg, 125 mcg, Oral, Early Morning, Roberto Colorado MD, 125 mcg at 08/21/19 0555    OLANZapine (ZyPREXA) tablet 5 mg, 5 mg, Oral, Q8H PRN, Roberto Colorado MD    pantoprazole (PROTONIX) EC tablet 40 mg, 40 mg, Oral, Early Morning, Roberto Colorado MD, 40 mg at 08/21/19 0556    theophylline (JEF-24) 24 hr capsule 200 mg, 200 mg, Oral, Daily, Roberto Colorado MD, 200 mg at 08/21/19 0918    tiotropium (SPIRIVA) capsule for inhaler 18 mcg, 18 mcg, Inhalation, Daily, Sharath Dawson MD, 18 mcg at 08/21/19 0911    traZODone (DESYREL) tablet 25 mg, 25 mg, Oral, HS PRN, Jerome Lopez MD  Justification if on more than two antipsychotics:  Only on his clozapine  Side effects if any:  None    Risks , benefits, side effects and precautions of medications discussed with patient and reminded patient to let us know any problems with medications     Objective:     Vital Signs:  Vitals:    08/20/19 0730 08/20/19 0733 08/21/19 0700 08/21/19 0705   BP: 102/72 129/86 106/67 (!) 80/51   BP Location: Left arm  Left arm Left arm   Pulse: 89 103 (!) 116 (!) 122   Resp: 18 19 19   Temp: 97 6 °F (36 4 °C)  (!) 100 9 °F (38 3 °C)    TempSrc: Temporal  Temporal    SpO2:       Weight:       Height:         Appearance:  age appropriate, casually dressed and overweight older than stated age   Behavior:  deviant, evasive and guarded and suspicious but with good eye contact   Speech:  normal pitch and normal volume but tends to become loud at times   Mood:  anxious and dysthymic   Affect:  mood-congruent   Thought Process:  goal directed and illogical slightly pressured but able to be redirected and talks as if in a court of low being stilted   Thought Content:  delusions  grandiose and somatic about not being able to breathe despite being on nasal oxygen and paranoid about people out to harm her and Atrovent inhaler tainteded with peanut oil  No current suicidal homicidal thoughts intent or plans reported    No phobias obsessions or compulsions reported   Perceptual Disturbances: None and does not appear responding to internal stimuli   Risk Potential: Tendency to not care for herself if she goes off treatment   Sensorium:  person, place, time/date, situation, day of week, month of year and time   Cognition:  grossly intact   Consciousness:  alert and awake    Attention: Intact concentration and attention span   Intellect: Considered to be at least of average intelligence   Insight: limited in denial   Judgment: poor      Motor Activity: no abnormal movements         Recent Labs:  Results Reviewed     None          I/O Past 24 hours:  No intake/output data recorded  No intake/output data recorded  Assessment / Plan:     Schizoaffective disorder, bipolar type ([facility] [address] )      Reason for continued inpatient care:  Because of underlying paranoia and refusal to go back to the personal care home and inability to care for herself on her own  Acceptance by patient:  Accepting  Robert Yadav in recovery:  About living in another personal care home once she feels better  Understanding of medications :  Has some understanding  Involved in reintegration process:  Adjusting to the unit  Trusting in relatoinship with psychiatrist:  Trusting    Recommended Treatment:    Medication changes:  1) none today  Non-pharmacological treatments  1) start individual therapy group therapy, milieu therapy and occupational therapy and milieu therapy involving multidisciplinary team approach with psychotherapist, case management, nursing, peer support services, art therapist etc using recovery principles and psycho-education about accepting illness and need for treatment   3) check laps and encourage p o  Fluids because of low-grade fever  Safety  1) Safety/communication plan established targeting dynamic risk factors above  Discharge Plan most likely back to the a:  Personal care boarding home with act services once her delusions are managed and mood becomes more stabilized and she becomes more receptive to treatment compliance    Counseling / Coordination of Care    Total floor / unit time spent today 15 minutes  Greater than 50% of total time was spent with the patient and / or family counseling and / or coordination of care  A description of the counseling / coordination of care       Patient's Rights, confidentiality and exceptions to confidentiality, use of automated medical record, [address] [facility] [facility] staff access to medical record, and consent to treatment reviewed      Alirio Frazier MD

## 2019-08-21 NOTE — PROGRESS NOTES
Unable to collect blood work at this time, will continue to encourage fluids, UA collected results pending, EKG completed

## 2019-08-21 NOTE — ASSESSMENT & PLAN NOTE
Psychiatry Progress Note    Subjective: Interval History     Patient did have a slight increase in temp to 100 and was reporting that she was she wearing last night and was feeling weak  Therefore some stat blood work was ordered to make sure that she is not having any acute infections including a urine analysis  She is breathing fine with no VS breathing difficulties  He has  again attending only sporadic groups  and is still focused and preoccupied about her breathing difficulty despite the fact that she is breathing fine and her pulse ox has been acceptable so far to around 90%  She is now complaining about the shower despite being offered the handicapped shower room  She feels slightly better since the clozapine was switched around but since she is on Prozac we did check the clozapine level which is now back as 324 way below toxic range  She still complains about occasional drooling which has lessened with the splitting of the dosage of Clozaril  She has chosen not to to use the portable oxygen to get out of bed and go to groups if necessary and wants to have special accomodations to take showers now  She continues to talk in a  Stilted, rambling manner and monotonous manner as usual as if talking in a court of law which has not changed since she came to the unit  She does not admit to any overt hallucinations or paranoia but still appears to harbor some covert  paranoia off and on  She still does not always trust certain nursing staff giving her the medications  She continues to believe that  the inhaler atrovent has peanut oil in it  She  still points to the lipoma on her and claims that it is a micro chip implanted on and this appears to be a fixed delusion and wound is not willing to change her beliefe about the same  She is guarded suspicious evasive and in  denial of her illness as usual  No current suicidal homicidal thoughts intent or plans reported    No overt hallucinations or other delusions reported   No signs or sxs of agranulocytosis or myocarditis or endocarditis and she is moving her bowels so far with no issues     Staff has been encouraging to accept her medications and attend more groups as scheduled and work on her recovery and is now tolerating the the clozapine as 3 times a day so far   Current medications:    Current Facility-Administered Medications:     acetaminophen (TYLENOL) tablet 650 mg, 650 mg, Oral, Q6H PRN, Carissa Willis MD    acetaminophen (TYLENOL) tablet 650 mg, 650 mg, Oral, Q6H PRN, Carissa Willis MD    acetaminophen (TYLENOL) tablet 650 mg, 650 mg, Oral, Q6H PRN, Carissa Willis MD    albuterol (PROVENTIL HFA,VENTOLIN HFA) inhaler 2 puff, 2 puff, Inhalation, Q4H PRN, Carissa Willis MD, 2 puff at 07/25/19 1200    benzonatate (TESSALON PERLES) capsule 100 mg, 100 mg, Oral, TID PRN, Carissa Willis MD    cloZAPine (CLOZARIL) tablet 50 mg, 50 mg, Oral, TID, Carissa Willis MD, 50 mg at 08/21/19 4015    FLUoxetine (PROzac) capsule 10 mg, 10 mg, Oral, Daily, Carissa Willis MD, 10 mg at 08/21/19 0918    fluticasone-vilanterol (BREO ELLIPTA) 200-25 MCG/INH inhaler 1 puff, 1 puff, Inhalation, Daily, Carissa Willis MD, 1 puff at 08/21/19 0913    levothyroxine tablet 125 mcg, 125 mcg, Oral, Early Morning, Carissa Willis MD, 125 mcg at 08/21/19 0555    OLANZapine (ZyPREXA) tablet 5 mg, 5 mg, Oral, Q8H PRN, Carissa Willis MD    pantoprazole (PROTONIX) EC tablet 40 mg, 40 mg, Oral, Early Morning, Carissa Willis MD, 40 mg at 08/21/19 0556    theophylline (JEF-24) 24 hr capsule 200 mg, 200 mg, Oral, Daily, Carissa Willis MD, 200 mg at 08/21/19 0918    tiotropium (SPIRIVA) capsule for inhaler 18 mcg, 18 mcg, Inhalation, Daily, Matteo Chen MD, 18 mcg at 08/21/19 0911    traZODone (DESYREL) tablet 25 mg, 25 mg, Oral, HS PRN, Carissa Willis MD  Justification if on more than two antipsychotics:  Only on his clozapine  Side effects if any:  None    Risks , benefits, side effects and precautions of medications discussed with patient and reminded patient to let us know any problems with medications     Objective:     Vital Signs:  Vitals:    08/20/19 0730 08/20/19 0733 08/21/19 0700 08/21/19 0705   BP: 102/72 129/86 106/67 (!) 80/51   BP Location: Left arm  Left arm Left arm   Pulse: 89 103 (!) 116 (!) 122   Resp: 18 19 19   Temp: 97 6 °F (36 4 °C)  (!) 100 9 °F (38 3 °C)    TempSrc: Temporal  Temporal    SpO2:       Weight:       Height:         Appearance:  age appropriate, casually dressed and overweight older than stated age   Behavior:  deviant, evasive and guarded and suspicious but with good eye contact   Speech:  normal pitch and normal volume but tends to become loud at times   Mood:  anxious and dysthymic   Affect:  mood-congruent   Thought Process:  goal directed and illogical slightly pressured but able to be redirected and talks as if in a court of low being stilted   Thought Content:  delusions  grandiose and somatic about not being able to breathe despite being on nasal oxygen and paranoid about people out to harm her and Atrovent inhaler tainteded with peanut oil  No current suicidal homicidal thoughts intent or plans reported  No phobias obsessions or compulsions reported   Perceptual Disturbances: None and does not appear responding to internal stimuli   Risk Potential: Tendency to not care for herself if she goes off treatment   Sensorium:  person, place, time/date, situation, day of week, month of year and time   Cognition:  grossly intact   Consciousness:  alert and awake    Attention: Intact concentration and attention span   Intellect: Considered to be at least of average intelligence   Insight:  limited in denial   Judgment: poor      Motor Activity: no abnormal movements         Recent Labs:  Results Reviewed     None          I/O Past 24 hours:  No intake/output data recorded  No intake/output data recorded          Assessment / Plan:     Schizoaffective disorder, bipolar type [facility])      Reason for continued inpatient care:  Because of underlying paranoia and refusal to go back to the personal care home and inability to care for herself on her own  Acceptance by patient:  Accepting  Jabari Marie in recovery:  About living in another personal care home once she feels better  Understanding of medications :  Has some understanding  Involved in reintegration process:  Adjusting to the unit  Trusting in relatoinship with psychiatrist:  Trusting    Recommended Treatment:    Medication changes:  1) none today  Non-pharmacological treatments  1) start individual therapy group therapy, milieu therapy and occupational therapy and milieu therapy involving multidisciplinary team approach with psychotherapist, case management, nursing, peer support services, art therapist etc using recovery principles and psycho-education about accepting illness and need for treatment   3) check laps and encourage p o  Fluids because of low-grade fever  Safety  1) Safety/communication plan established targeting dynamic risk factors above  Discharge Plan most likely back to the a:  Personal care boarding home with act services once her delusions are managed and mood becomes more stabilized and she becomes more receptive to treatment compliance    Counseling / Coordination of Care    Total floor / unit time spent today 15 minutes  Greater than 50% of total time was spent with the patient and / or family counseling and / or coordination of care  A description of the counseling / coordination of care  Patient's Rights, confidentiality and exceptions to confidentiality, use of automated medical record, [facility] staff access to medical record, and consent to treatment reviewed      Jennifer Taveras MD

## 2019-08-21 NOTE — PROGRESS NOTES
Vital signs, EKG, UA, CBC CMP and TSH results reviewed by Dr Felipa Henderson and Dr Edgardo Snell covering for [facility], lung sounds and ears assessed, will continue to encourage fluids and activity and notify medical staff if her blood pressure does not improve, No further episodes of nausea and/or vomiting, afebrile at this time

## 2019-08-21 NOTE — PROGRESS NOTES
08/21/19 0800   Team Meeting   Meeting Type Daily Rounds   Team Members Present   Team Members Present Physician;Nurse;; Other (Discipline and Name)   Physician Team Member Dr Sudhir Sheffield Team Member Dmitriy Blas RN   Care Management Team Member Brenda Mccord   Other (Discipline and Name) Kelby [facility]     Patient attended [facility] group yesterday  Preoccupied with lab results but staff reassured her doctors are monitoring  No other issues noted

## 2019-08-21 NOTE — PLAN OF CARE
Problem: Alteration in Thoughts and Perception  Goal: Agree to be compliant with medication regime, as prescribed and report medication side effects  Description  Interventions:  - Offer appropriate PRN medication and supervise ingestion; conduct aims, as needed   Outcome: Progressing  Goal: Attend and participate in unit activities, including therapeutic, recreational, and educational groups  Description  Interventions:  - Provide therapeutic and educational activities daily, encourage attendance and participation, and document same in the medical record   Outcome: Progressing  Goal: Complete daily ADLs, including personal hygiene independently, as able  Description  Interventions:  - Observe, teach, and assist patient with ADLS  - Monitor and promote a balance of rest/activity, with adequate nutrition and elimination   Outcome: Progressing     Problem: Risk for Self Injury/Neglect  Goal: Refrain from harming self  Description  Interventions:  - Monitor patient closely, per order  - Develop a trusting relationship  - Supervise medication ingestion, monitor effects and side effects   Outcome: Progressing     Problem: Anxiety  Goal: Anxiety is at manageable level  Description  Interventions:  - Assess and monitor patient's anxiety level  - Monitor for signs and symptoms of anxiety both physical and emotional (heart palpitations, chest pain, shortness of breath, headaches, nausea, feeling jumpy, restlessness, irritable, apprehensive)  - Collaborate with interdisciplinary team and initiate plan and interventions as ordered    - [place] patient to unit/surroundings  - Explain treatment plan  - Encourage participation in care  - Encourage verbalization of concerns/fears  - Identify coping mechanisms  - Assist in developing anxiety-reducing skills  - Administer/offer alternative therapies  - Limit or eliminate stimulants  Outcome: Progressing     Problem: Alteration in Orientation  Goal: Interact with staff daily  Description  Interventions:  - Assess and re-assess patient's level of orientation  - Engage patient in 1 on 1 interactions, daily, for a minimum of 15 minutes   - Establish rapport/trust with patient   Outcome: Progressing     Problem: SAFETY ADULT  Goal: Patient will remain free of falls  Description  INTERVENTIONS:  - Assess patient frequently for physical needs  -  Identify cognitive and physical deficits and behaviors that affect risk of falls  -  [place] fall precautions as indicated by assessment   - Educate patient/family on patient safety including physical limitations  - Instruct patient to call for assistance with activity based on assessment  - Modify environment to reduce risk of injury  - Consider OT/PT consult to assist with strengthening/mobility  Outcome: Progressing     Problem: RESPIRATORY - ADULT  Goal: Achieves optimal ventilation and oxygenation  Description  INTERVENTIONS:  - Assess for changes in respiratory status  - Assess for changes in mentation and behavior  - Position to facilitate oxygenation and minimize respiratory effort  - Oxygen administration by appropriate delivery method based on oxygen saturation (per order) or ABGs  - Initiate smoking cessation education as indicated  - Encourage broncho-pulmonary hygiene including cough, deep breathe, Incentive Spirometry  - Assess the need for suctioning and aspirate as needed  - Assess and instruct to report SOB or any respiratory difficulty  - Respiratory Therapy support as indicated  Outcome: Maribell Girffin has been in her room most of the shift  Social with select peers when she does come out  Pleasant and cooperative upon approach by staff  Came out for medications and took pills without difficulty  Ate 50% of her meal  Did not attend group this evening  No complaint of breathing issues  Ate snack and then came for HS medication  Requested and was given her oxygen concentrator prior to going to bed   Oxygen 1 liter via nasal cannula  Continue to monitor  Precautions maintained

## 2019-08-21 NOTE — PROGRESS NOTES
Nausea and vomiting this am, unknown amount of clear yellow fluid noted by nursing staff, 100 9 temp, standing BP 80/51 and heart rate 122, no change in level of conscious

## 2019-08-21 NOTE — PROGRESS NOTES
Ate 25% of lunch, pulse a banana and a yogurt, pulse Ox 90% on room air complaining now to [facility] staff "earache"

## 2019-08-21 NOTE — PROGRESS NOTES
Dr Abrahan Manzo notified of low grade fever and complaint of nausea, new orders received, accepting fluids now but refused breakfast

## 2019-08-21 NOTE — PLAN OF CARE
Problem: PAIN - ADULT  Goal: Verbalizes/displays adequate comfort level or baseline comfort level  Description  Interventions:  - Encourage patient to monitor pain and request assistance  - Assess pain using appropriate pain scale  - Administer analgesics based on type and severity of pain and evaluate response  - Implement non-pharmacological measures as appropriate and evaluate response  - Consider cultural and social influences on pain and pain management  - Notify physician/advanced practitioner if interventions unsuccessful or patient reports new pain  Outcome: Progressing     Problem: SAFETY ADULT  Goal: Patient will remain free of falls  Description  INTERVENTIONS:  - Assess patient frequently for physical needs  -  Identify cognitive and physical deficits and behaviors that affect risk of falls    -  [place] fall precautions as indicated by assessment   - Educate patient/family on patient safety including physical limitations  - Instruct patient to call for assistance with activity based on assessment  - Modify environment to reduce risk of injury  - Consider OT/PT consult to assist with strengthening/mobility  Outcome: Progressing     Problem: RESPIRATORY - ADULT  Goal: Achieves optimal ventilation and oxygenation  Description  INTERVENTIONS:  - Assess for changes in respiratory status  - Assess for changes in mentation and behavior  - Position to facilitate oxygenation and minimize respiratory effort  - Oxygen administration by appropriate delivery method based on oxygen saturation (per order) or ABGs  - Initiate smoking cessation education as indicated  - Encourage broncho-pulmonary hygiene including cough, deep breathe, Incentive Spirometry  - Assess the need for suctioning and aspirate as needed  - Assess and instruct to report SOB or any respiratory difficulty  - Respiratory Therapy support as indicated  Outcome: Quinn Sencoleen maintained on ongoing fall and SAFE precaution without incident on this shift   Laying in bed with eyes closed, breath even and unlabored with O2@ 1L/M via nasal cannula   No sign or symptom of respiratory distress  Jaya Elizabeth will continue to remain free from fall   Denies any pain or discomfort at this moment   During the night Maggie c/o of nausea, PRN ginger ale given  She yelling out from her room demanding that her temperature to be taken  Temp 97 3    Will continue to monitor behavior, pain and sleep pattern

## 2019-08-22 LAB
ATRIAL RATE: 102 BPM
BASOPHILS # BLD AUTO: 0.1 THOUSANDS/ΜL (ref 0–0.1)
BASOPHILS NFR BLD AUTO: 1 % (ref 0–1)
EOSINOPHIL # BLD AUTO: 0.1 THOUSAND/ΜL (ref 0–0.4)
EOSINOPHIL NFR BLD AUTO: 1 % (ref 0–6)
ERYTHROCYTE [DISTWIDTH] IN BLOOD BY AUTOMATED COUNT: 15.3 %
HCT VFR BLD AUTO: 41.1 % (ref 36–46)
HGB BLD-MCNC: 13.4 G/DL (ref 12–16)
LYMPHOCYTES # BLD AUTO: 2.7 THOUSANDS/ΜL (ref 0.5–4)
LYMPHOCYTES NFR BLD AUTO: 21 % (ref 25–45)
MCH RBC QN AUTO: 30.3 PG (ref 26–34)
MCHC RBC AUTO-ENTMCNC: 32.5 G/DL (ref 31–36)
MCV RBC AUTO: 93 FL (ref 80–100)
MONOCYTES # BLD AUTO: 0.9 THOUSAND/ΜL (ref 0.2–0.9)
MONOCYTES NFR BLD AUTO: 7 % (ref 1–10)
NEUTROPHILS # BLD AUTO: 9.3 THOUSANDS/ΜL (ref 1.8–7.8)
NEUTS SEG NFR BLD AUTO: 71 % (ref 45–65)
P AXIS: 70 DEGREES
PLATELET # BLD AUTO: 261 THOUSANDS/UL (ref 150–450)
PMV BLD AUTO: 9.2 FL (ref 8.9–12.7)
PR INTERVAL: 148 MS
QRS AXIS: -62 DEGREES
QRSD INTERVAL: 84 MS
QT INTERVAL: 328 MS
QTC INTERVAL: 427 MS
RBC # BLD AUTO: 4.41 MILLION/UL (ref 4–5.2)
T WAVE AXIS: 48 DEGREES
VENTRICULAR RATE: 102 BPM
WBC # BLD AUTO: 13.1 THOUSAND/UL (ref 4.5–11)

## 2019-08-22 PROCEDURE — 85025 COMPLETE CBC W/AUTO DIFF WBC: CPT | Performed by: FAMILY MEDICINE

## 2019-08-22 PROCEDURE — 99231 SBSQ HOSP IP/OBS SF/LOW 25: CPT | Performed by: PSYCHIATRY & NEUROLOGY

## 2019-08-22 PROCEDURE — 93010 ELECTROCARDIOGRAM REPORT: CPT | Performed by: INTERNAL MEDICINE

## 2019-08-22 RX ADMIN — CLOZAPINE 50 MG: 25 TABLET ORAL at 08:39

## 2019-08-22 RX ADMIN — THEOPHYLLINE ANHYDROUS 200 MG: 200 CAPSULE, EXTENDED RELEASE ORAL at 08:39

## 2019-08-22 RX ADMIN — CLOZAPINE 50 MG: 25 TABLET ORAL at 18:03

## 2019-08-22 RX ADMIN — FLUOXETINE 10 MG: 10 CAPSULE ORAL at 08:39

## 2019-08-22 RX ADMIN — PANTOPRAZOLE SODIUM 40 MG: 40 TABLET, DELAYED RELEASE ORAL at 06:08

## 2019-08-22 RX ADMIN — CLOZAPINE 50 MG: 25 TABLET ORAL at 21:30

## 2019-08-22 RX ADMIN — TIOTROPIUM BROMIDE 18 MCG: 18 CAPSULE ORAL; RESPIRATORY (INHALATION) at 08:39

## 2019-08-22 RX ADMIN — LEVOTHYROXINE SODIUM 125 MCG: 125 TABLET ORAL at 06:08

## 2019-08-22 RX ADMIN — FLUTICASONE FUROATE AND VILANTEROL TRIFENATATE 1 PUFF: 200; 25 POWDER RESPIRATORY (INHALATION) at 08:39

## 2019-08-22 NOTE — QUICK NOTE
Page received regarding pt; new complaint of hemoptysis significant for 1 day  States that she had three separate episodes of hemoptysis throughout the day, however total sputum was approx 5 cc from all three episodes  Currently she feels well, has occasional SOB, denying associated f/c, n/v, palpitations, or cough  Vitals:    08/22/19 1915   BP: 95/64   Pulse: 96   Resp: 18   Temp: 98 2 °F (36 8 °C)   SpO2: 93%       Physical Exam   Constitutional: She appears well-developed and well-nourished  No distress  HENT:   Oropharynx clear and mucus membranes slightly dry   Cardiovascular: Normal rate, regular rhythm and normal heart sounds  Pulmonary/Chest: Effort normal and breath sounds normal    Skin: Skin is warm and dry  She is not diaphoretic  Psychiatric: She has a normal mood and affect  Vitals reviewed  Today Labs reviewed, CBC WNL, hg hct stable    At this time, pt appears clinically well and physical exam was within normal limits  Will continue to monitor vitals overnight, and nurse has been notified to call regarding any further episodes of hemoptysis       Discussed w/ Dr Orly Brown

## 2019-08-22 NOTE — PLAN OF CARE
Problem: Alteration in Thoughts and Perception  Goal: Verbalize thoughts and feelings  Description  Interventions:  - Promote a nonjudgmental and trusting relationship with the patient through active listening and therapeutic communication  - Assess patient's level of functioning, behavior and potential for risk  - Engage patient in 1 on 1 interactions for a minimum of 15 minutes each session  - Encourage patient to express fears, feelings, frustrations, and discuss symptoms    - [place] patient to reality, help patient recognize reality-based thinking   - Administer medications as ordered and assess for potential side effects  - Provide the patient education related to the signs and symptoms of the illness and desired effects of prescribed medications  Outcome: Progressing  Goal: Agree to be compliant with medication regime, as prescribed and report medication side effects  Description  Interventions:  - Offer appropriate PRN medication and supervise ingestion; conduct aims, as needed   Outcome: Progressing  Goal: Attend and participate in unit activities, including therapeutic, recreational, and educational groups  Description  Interventions:  - Provide therapeutic and educational activities daily, encourage attendance and participation, and document same in the medical record   Outcome: Not Progressing  Goal: Complete daily ADLs, including personal hygiene independently, as able  Description  Interventions:  - Observe, teach, and assist patient with ADLS  - Monitor and promote a balance of rest/activity, with adequate nutrition and elimination   Outcome: Not Progressing     Problem: SAFETY ADULT  Goal: Patient will remain free of falls  Description  INTERVENTIONS:  - Assess patient frequently for physical needs  -  Identify cognitive and physical deficits and behaviors that affect risk of falls    -  [place] fall precautions as indicated by assessment   - Educate patient/family on patient safety including physical limitations  - Instruct patient to call for assistance with activity based on assessment  - Modify environment to reduce risk of injury  - Consider OT/PT consult to assist with strengthening/mobility  Outcome: Progressing     8/22/19 Shift 7-3  Fall Precautions   8/21, [facility] 8/17   Appetite intact   Groups: None  Encouraged oral care, hygiene, ADL's, fluids and activity, which patient denied after multiple attempts  Vital signs WNL  Incentive spirometer every 4 hours and PRN while awake hitting 1000 mL, only used it once this shift, verbalized feeling too tired to use it the other times  Refused to ambulate the hallway, but gets out of bed to go to meals without complaints of discomfort  Remains pre-occupied with COPD symptoms and does not address her psych related diagnosis  Will continue to monitor for changes

## 2019-08-22 NOTE — PROGRESS NOTES
08/22/19 0800   Team Meeting   Meeting Type Daily Rounds   Team Members Present   Team Members Present Physician;Nurse;; Other (Discipline and Name)   Physician Team Member Dr Mikal Plascencia Team Member Catherine Mata RN   Care Management Team Member Brenda Andrade   Other (Discipline and Name) Shanta Jacques [place]; Gilford Dull, CPS     Patient presented somatic yesterday  Did have an episode of vomiting yesterday morning  Medical following her labwork  Slept

## 2019-08-22 NOTE — PLAN OF CARE
Problem: Alteration in Thoughts and Perception  Goal: Agree to be compliant with medication regime, as prescribed and report medication side effects  Description  Interventions:  - Offer appropriate PRN medication and supervise ingestion; conduct aims, as needed   Outcome: Progressing     Problem: RESPIRATORY - ADULT  Goal: Achieves optimal ventilation and oxygenation  Description  INTERVENTIONS:  - Assess for changes in respiratory status  - Assess for changes in mentation and behavior  - Position to facilitate oxygenation and minimize respiratory effort  - Oxygen administration by appropriate delivery method based on oxygen saturation (per order) or ABGs  - Initiate smoking cessation education as indicated  - Encourage broncho-pulmonary hygiene including cough, deep breathe, Incentive Spirometry  - Assess the need for suctioning and aspirate as needed  - Assess and instruct to report SOB or any respiratory difficulty  - Respiratory Therapy support as indicated  Outcome: Progressing     Problem: Alteration in Thoughts and Perception  Goal: Attend and participate in unit activities, including therapeutic, recreational, and educational groups  Description  Interventions:  - Provide therapeutic and educational activities daily, encourage attendance and participation, and document same in the medical record   Outcome: Not Progressing  Goal: Complete daily ADLs, including personal hygiene independently, as able  Description  Interventions:  - Observe, teach, and assist patient with ADLS  - Monitor and promote a balance of rest/activity, with adequate nutrition and elimination   Outcome: Not Progressing     Problem: Ineffective Coping  Goal: Demonstrates healthy coping skills  Outcome: Not Progressing     Problem: Depression  Goal: Refrain from isolation  Description  Interventions:  - Develop a trusting relationship   - Encourage socialization   Outcome: Not Progressing     [address] has been isolative to her room laying in bed w/exception of coming out to eat 100% meal, for scheduled medicines, to eat HS snack  Is cooperative w/doing the [facility] [place] Incentive Spirometry, though, her volumes not as good as two nights ago, averaging 750ml  "I'm tired " But, overall says is feeling improved from earlier today  She is pleasant upon approach  No effort or interest in tackling hygiene  Did not attend PM Group  Now wearing the QHS nasal O2 @ 1L for bedtime w/pre-application PO2 of 05% on room air

## 2019-08-22 NOTE — PLAN OF CARE
Problem: PAIN - ADULT  Goal: Verbalizes/displays adequate comfort level or baseline comfort level  Description  Interventions:  - Encourage patient to monitor pain and request assistance  - Assess pain using appropriate pain scale  - Administer analgesics based on type and severity of pain and evaluate response  - Implement non-pharmacological measures as appropriate and evaluate response  - Consider cultural and social influences on pain and pain management  - Notify physician/advanced practitioner if interventions unsuccessful or patient reports new pain  Outcome: Progressing     Problem: SAFETY ADULT  Goal: Patient will remain free of falls  Description  INTERVENTIONS:  - Assess patient frequently for physical needs  -  Identify cognitive and physical deficits and behaviors that affect risk of falls    -  [place] fall precautions as indicated by assessment   - Educate patient/family on patient safety including physical limitations  - Instruct patient to call for assistance with activity based on assessment  - Modify environment to reduce risk of injury  - Consider OT/PT consult to assist with strengthening/mobility  Outcome: Progressing     Problem: RESPIRATORY - ADULT  Goal: Achieves optimal ventilation and oxygenation  Description  INTERVENTIONS:  - Assess for changes in respiratory status  - Assess for changes in mentation and behavior  - Position to facilitate oxygenation and minimize respiratory effort  - Oxygen administration by appropriate delivery method based on oxygen saturation (per order) or ABGs  - Initiate smoking cessation education as indicated  - Encourage broncho-pulmonary hygiene including cough, deep breathe, Incentive Spirometry  - Assess the need for suctioning and aspirate as needed  - Assess and instruct to report SOB or any respiratory difficulty  - Respiratory Therapy support as indicated  Outcome: Progressing    ~Maggie maintained on ongoing fall and SAFE precaution without incident on this shift  Laying in bed with her eyes closed, breath even and unlabored with o2 @1L/m  No sign or symptom of respiratory distress noted  Tu Losecoleen continues to remain free from fall  Denies any pain or discomfort  Thus far she is in stabled condition  Q 15 minutes checks during the night continues  No behavioral noted     Will continue to monitor behavioral, respiratory, and sleep pattern   ~Maggie has a scheduled lab to be obtain in the morning, CBC w/Diff

## 2019-08-22 NOTE — ASSESSMENT & PLAN NOTE
Psychiatry Progress Note    Subjective: Interval History     Patient did have increase in WBC with increase in bands and this was reviewed by medical   She is due for a repeat blood work this morning and fluid intake was increased yesterday and no medical intervention was ordered  She has been afebrile since yesterday  She is breathing fine with no visible breathing difficulties  He has  again attending only sporadic groups  and is still focused and preoccupied about her breathing difficulty despite the fact that she is breathing fine and her pulse ox has been acceptable so far and she has been staying back to herself on her bed because of her physical complaints yesterday  She feels slightly better since the clozapine was switched around  She still complains about occasional drooling which has lessened with the splitting of the dosage of Clozaril  She has chosen not to to use the portable oxygen to get out of bed and go to groups if necessary and wants to have special accomodations to take showers now  She continues to talk in a  stilted, rambling manner and monotonous manner as usual as if talking in a court of law   She does not admit to any overt hallucinations or paranoia but still appears to harbor some covert  paranoia off and on  She still does not always trust certain nursing staff giving her the medications as she continues to believe that  the inhaler atrovent has peanut oil in it  She  still points to the lipoma on her and claims that it is a micro chip implanted on and this appears to be a fixed delusio  She is guarded suspicious evasive and in  denial of her illness as usual  No current suicidal homicidal thoughts intent or plans reported  No overt hallucinations or other delusions reported   No signs or sxs of agranulocytosis or myocarditis or endocarditis and she is moving her bowels so far with no issues     Staff has been encouraging to accept her medications and attend more groups as scheduled and work on her recovery Current medications:    Current Facility-Administered Medications:     acetaminophen (TYLENOL) tablet 650 mg, 650 mg, Oral, Q6H PRN, Zander Yanez MD    albuterol (PROVENTIL HFA,VENTOLIN HFA) inhaler 2 puff, 2 puff, Inhalation, Q4H PRN, Zander Yanez MD, 2 puff at 07/25/19 1200    benzonatate (TESSALON PERLES) capsule 100 mg, 100 mg, Oral, TID PRN, Zander Yanez MD    cloZAPine (CLOZARIL) tablet 50 mg, 50 mg, Oral, TID, Zander Yanez MD, 50 mg at 08/21/19 2133    FLUoxetine (PROzac) capsule 10 mg, 10 mg, Oral, Daily, Zander Yanez MD, 10 mg at 08/21/19 0918    fluticasone-vilanterol (BREO ELLIPTA) 200-25 MCG/INH inhaler 1 puff, 1 puff, Inhalation, Daily, Zander Yanez MD, 1 puff at 08/21/19 0913    levothyroxine tablet 125 mcg, 125 mcg, Oral, Early Morning, Zander Yanez MD, 125 mcg at 08/22/19 0608    OLANZapine (ZyPREXA) tablet 5 mg, 5 mg, Oral, Q8H PRN, Zander Yanez MD    pantoprazole (PROTONIX) EC tablet 40 mg, 40 mg, Oral, Early Morning, Zander Yanez MD, 40 mg at 08/22/19 0608    theophylline (JEF-24) 24 hr capsule 200 mg, 200 mg, Oral, Daily, Zander Yanez MD, 200 mg at 08/21/19 0918    tiotropium (SPIRIVA) capsule for inhaler 18 mcg, 18 mcg, Inhalation, Daily, Sallie Mccabe MD, 18 mcg at 08/21/19 0911    traZODone (DESYREL) tablet 25 mg, 25 mg, Oral, HS PRN, Zander Yanez MD  Justification if on more than two antipsychotics:  Only on his clozapine  Side effects if any:  None    Risks , benefits, side effects and precautions of medications discussed with patient and reminded patient to let us know any problems with medications     Objective:     Vital Signs:  Vitals:    08/21/19 1900 08/21/19 2145 08/22/19 0000 08/22/19 0408   BP: 109/55  99/54 115/54   BP Location: Left arm  Left arm Left arm   Pulse: 96  (!) 106 82   Resp: 16  16 18   Temp: 98 4 °F (36 9 °C)  97 6 °F (36 4 °C) 97 7 °F (36 5 °C)   TempSrc: Temporal  Temporal Temporal   SpO2: 92% 92% 96% 92%   Weight: Height:         Appearance:  age appropriate, casually dressed and overweight older than stated age   Behavior:  deviant, evasive and guarded and suspicious but with good eye contact   Speech:  normal pitch and normal volume but tends to become loud at times   Mood:  anxious and dysthymic   Affect:  mood-congruent   Thought Process:  goal directed and illogical slightly pressured but able to be redirected and talks as if in a court of low being stilted   Thought Content:  delusions  grandiose and somatic about not being able to breathe despite being on nasal oxygen and paranoid about people out to harm her and Atrovent inhaler tainteded with peanut oil  No current suicidal homicidal thoughts intent or plans reported  No phobias obsessions or compulsions reported   Perceptual Disturbances: None and does not appear responding to internal stimuli   Risk Potential: Tendency to not care for herself if she goes off treatment   Sensorium:  person, place, time/date, situation, day of week, month of year and time   Cognition:  grossly intact   Consciousness:  alert and awake    Attention: Intact concentration and attention span   Intellect: Considered to be at least of average intelligence   Insight:  limited in denial   Judgment: poor      Motor Activity: no abnormal movements         Recent Labs:  Results Reviewed     None          I/O Past 24 hours:  No intake/output data recorded  No intake/output data recorded          Assessment / Plan:     [facility]ffect[facility]      Reason for continued inpatient care:  Because of underlying paranoia and refusal to go back to the personal care home and inability to care for herself on her own  Acceptance by patient:  Accepting  Marianne Littlejohn in recovery:  About living in another personal care home once she feels better  Understanding of medications :  Has some understanding  Involved in reintegration process:  Adjusting to the unit  Trusting in relatoinship with psychiatrist:  Trusting    Recommended Treatment:    Medication changes:  1) none today  Non-pharmacological treatments  1) start individual therapy group therapy, milieu therapy and occupational therapy and milieu therapy involving multidisciplinary team approach with psychotherapist, case management, nursing, peer support services, art therapist etc using recovery principles and psycho-education about accepting illness and need for treatment   3) check laps and encourage p o  Fluids because of low-grade fever  Safety  1) Safety/communication plan established targeting dynamic risk factors above  Discharge Plan most likely back to the a:  Personal care boarding home with act services once her delusions are managed and mood becomes more stabilized and she becomes more receptive to treatment compliance    Counseling / Coordination of Care    Total floor / unit time spent today 15 minutes  Greater than 50% of total time was spent with the patient and / or family counseling and / or coordination of care  A description of the counseling / coordination of care  Patient's Rights, confidentiality and exceptions to confidentiality, use of automated medical record, [facility] staff access to medical record, and consent to treatment reviewed      Vincent Olivares MD

## 2019-08-22 NOTE — PROGRESS NOTES
Progress Note - Grecia Mealing 1955, 60 y o  female MRN: [id number]    Unit/Bed#: [facility] [place] [id number] Encounter: [id number]    Primary Care Provider: Layne New MD   Date and time admitted to hospital: 7/23/2019  5:30 PM        * Schizoaffective disorder, bipolar type [facility])  Assessment & Plan  Psychiatry Progress Note    Subjective: Interval History     Patient did have increase in WBC with increase in bands and this was reviewed by medical   She is due for a repeat blood work this morning and fluid intake was increased yesterday and no medical intervention was ordered  She has been afebrile since yesterday  She is breathing fine with no visible breathing difficulties  He has  again attending only sporadic groups  and is still focused and preoccupied about her breathing difficulty despite the fact that she is breathing fine and her pulse ox has been acceptable so far and she has been staying back to herself on her bed because of her physical complaints yesterday  She feels slightly better since the clozapine was switched around  She still complains about occasional drooling which has lessened with the splitting of the dosage of Clozaril  She has chosen not to to use the portable oxygen to get out of bed and go to groups if necessary and wants to have special accomodations to take showers now  She continues to talk in a  stilted, rambling manner and monotonous manner as usual as if talking in a court of law   She does not admit to any overt hallucinations or paranoia but still appears to harbor some covert  paranoia off and on  She still does not always trust certain nursing staff giving her the medications as she continues to believe that  the inhaler atrovent has peanut oil in it  She  still points to the lipoma on her and claims that it is a micro chip implanted on and this appears to be a fixed delusio    She is guarded suspicious evasive and in  denial of her illness as usual  No current suicidal homicidal thoughts intent or plans reported  No overt hallucinations or other delusions reported   No signs or sxs of agranulocytosis or myocarditis or endocarditis and she is moving her bowels so far with no issues     Staff has been encouraging to accept her medications and attend more groups as scheduled and work on her recovery Current medications:    Current Facility-Administered Medications:     acetaminophen (TYLENOL) tablet 650 mg, 650 mg, Oral, Q6H PRN, Jeffrey Gallegos MD    albuterol (PROVENTIL HFA,VENTOLIN HFA) inhaler 2 puff, 2 puff, Inhalation, Q4H PRN, Jeffrey Gallegos MD, 2 puff at 07/25/19 1200    benzonatate (TESSALON PERLES) capsule 100 mg, 100 mg, Oral, TID PRN, Jeffrey Gallegos MD    cloZAPine (CLOZARIL) tablet 50 mg, 50 mg, Oral, TID, Jeffrey Gallegos MD, 50 mg at 08/21/19 2133    FLUoxetine (PROzac) capsule 10 mg, 10 mg, Oral, Daily, Jeffrey Gallegos MD, 10 mg at 08/21/19 0918    fluticasone-vilanterol (BREO ELLIPTA) 200-25 MCG/INH inhaler 1 puff, 1 puff, Inhalation, Daily, Jeffrey Gallegos MD, 1 puff at 08/21/19 0913    levothyroxine tablet 125 mcg, 125 mcg, Oral, Early Morning, Jeffrey Gallegos MD, 125 mcg at 08/22/19 0608    OLANZapine (ZyPREXA) tablet 5 mg, 5 mg, Oral, Q8H PRN, Jeffrey Gallegos MD    pantoprazole (PROTONIX) EC tablet 40 mg, 40 mg, Oral, Early Morning, Jeffrey Gallegos MD, 40 mg at 08/22/19 0608    theophylline (JEF-24) 24 hr capsule 200 mg, 200 mg, Oral, Daily, Jeffrey Gallegos MD, 200 mg at 08/21/19 3036    tiotropium (SPIRIVA) capsule for inhaler 18 mcg, 18 mcg, Inhalation, Daily, Jessica Magana MD, 18 mcg at 08/21/19 0911    traZODone (DESYREL) tablet 25 mg, 25 mg, Oral, HS PRN, Jeffrey Gallegos MD  Justification if on more than two antipsychotics:  Only on his clozapine  Side effects if any:  None    Risks , benefits, side effects and precautions of medications discussed with patient and reminded patient to let us know any problems with medications     Objective:     Vital Signs:  Vitals:    08/21/19 1900 08/21/19 2145 08/22/19 0000 08/22/19 0408   BP: 109/55  99/54 115/54   BP Location: Left arm  Left arm Left arm   Pulse: 96  (!) 106 82   Resp: 16  16 18   Temp: 98 4 °F (36 9 °C)  97 6 °F (36 4 °C) 97 7 °F (36 5 °C)   TempSrc: Temporal  Temporal Temporal   SpO2: 92% 92% 96% 92%   Weight:       Height:         Appearance:  age appropriate, casually dressed and overweight older than stated age   Behavior:  deviant, evasive and guarded and suspicious but with good eye contact   Speech:  normal pitch and normal volume but tends to become loud at times   Mood:  anxious and dysthymic   Affect:  mood-congruent   Thought Process:  goal directed and illogical slightly pressured but able to be redirected and talks as if in a court of low being stilted   Thought Content:  delusions  grandiose and somatic about not being able to breathe despite being on nasal oxygen and paranoid about people out to harm her and Atrovent inhaler tainteded with peanut oil  No current suicidal homicidal thoughts intent or plans reported  No phobias obsessions or compulsions reported   Perceptual Disturbances: None and does not appear responding to internal stimuli   Risk Potential: Tendency to not care for herself if she goes off treatment   Sensorium:  person, place, time/date, situation, day of week, month of year and time   Cognition:  grossly intact   Consciousness:  alert and awake    Attention: Intact concentration and attention span   Intellect: Considered to be at least of average intelligence   Insight:  limited in denial   Judgment: poor      Motor Activity: no abnormal movements         Recent Labs:  Results Reviewed     None          I/O Past 24 hours:  No intake/output data recorded  No intake/output data recorded          Assessment / Plan:     Schizoaffective disorder, bipolar type [facility])      Reason for continued inpatient care:  Because of underlying paranoia and refusal to go back to the personal care home and inability to care for herself on her own  Acceptance by patient:  Accepting  Jabari Marie in recovery:  About living in another personal care home once she feels better  Understanding of medications :  Has some understanding  Involved in reintegration process:  Adjusting to the unit  Trusting in relatoinship with psychiatrist:  Trusting    Recommended Treatment:    Medication changes:  1) none today  Non-pharmacological treatments  1) start individual therapy group therapy, milieu therapy and occupational therapy and milieu therapy involving multidisciplinary team approach with psychotherapist, case management, nursing, peer support services, art therapist etc using recovery principles and psycho-education about accepting illness and need for treatment   3) check laps and encourage p o  Fluids because of low-grade fever  Safety  1) Safety/communication plan established targeting dynamic risk factors above  Discharge Plan most likely back to the a:  Personal care boarding home with act services once her delusions are managed and mood becomes more stabilized and she becomes more receptive to treatment compliance    Counseling / Coordination of Care    Total floor / unit time spent today 15 minutes  Greater than 50% of total time was spent with the patient and / or family counseling and / or coordination of care  A description of the counseling / coordination of care  Patient's Rights, confidentiality and exceptions to confidentiality, use of automated medical record, [facility] staff access to medical record, and consent to treatment reviewed      Jennifer Taveras MD

## 2019-08-22 NOTE — PROGRESS NOTES
~Maggie slept well thru the night  Vital signs stable  Encourage to increase fluids intake  Vital sign stable  Still continues to yell from her room when she need something instead of walking to the nurse's  Redirected to walk to the nurse's station for her request to prevent DVT and pneumonia  ~Maggie refused ab this morning at this time    She preferred the lab to be drawn after breakfast

## 2019-08-22 NOTE — PLAN OF CARE
Problem: Alteration in Thoughts and Perception  Goal: Attend and participate in unit activities, including therapeutic, recreational, and educational groups  Description  Interventions:  - Provide therapeutic and educational activities daily, encourage attendance and participation, and document same in the medical record   Outcome: Not Progressing    Psychotherapy note     Recovery Areas Addressed: Emotional and Behavioral    Recovery Obstacles Addressed: Multiple hospitalizations    Therapist attempted to meet with patient to review her Difficulties in Emotional Regulation Scale (DERS) and Discharge Preparedness Survey with her  Patient informed therapist that she has not completed either, so therapist offered to assist   Patient was not feeling well upon approach, and stated she had vomited the night prior  She stated that she would be interested in therapist helping her with the paperwork, but that she would like to do it when she is feeling better  Therapist will re-approach for same again this week

## 2019-08-23 ENCOUNTER — APPOINTMENT (INPATIENT)
Dept: RADIOLOGY | Facility: HOSPITAL | Age: 64
DRG: 750 | End: 2019-08-23
Payer: COMMERCIAL

## 2019-08-23 PROCEDURE — 71046 X-RAY EXAM CHEST 2 VIEWS: CPT

## 2019-08-23 PROCEDURE — 99231 SBSQ HOSP IP/OBS SF/LOW 25: CPT | Performed by: PSYCHIATRY & NEUROLOGY

## 2019-08-23 RX ORDER — MONTELUKAST SODIUM 10 MG/1
10 TABLET ORAL
Status: DISCONTINUED | OUTPATIENT
Start: 2019-08-23 | End: 2020-01-01

## 2019-08-23 RX ORDER — LORATADINE 10 MG/1
10 TABLET ORAL DAILY
Status: DISCONTINUED | OUTPATIENT
Start: 2019-08-23 | End: 2019-08-27

## 2019-08-23 RX ADMIN — PANTOPRAZOLE SODIUM 40 MG: 40 TABLET, DELAYED RELEASE ORAL at 06:22

## 2019-08-23 RX ADMIN — LEVOTHYROXINE SODIUM 125 MCG: 125 TABLET ORAL at 06:22

## 2019-08-23 RX ADMIN — FLUOXETINE 10 MG: 10 CAPSULE ORAL at 08:39

## 2019-08-23 RX ADMIN — MONTELUKAST SODIUM 10 MG: 10 TABLET, COATED ORAL at 21:16

## 2019-08-23 RX ADMIN — CLOZAPINE 50 MG: 25 TABLET ORAL at 17:54

## 2019-08-23 RX ADMIN — FLUTICASONE FUROATE AND VILANTEROL TRIFENATATE 1 PUFF: 200; 25 POWDER RESPIRATORY (INHALATION) at 08:40

## 2019-08-23 RX ADMIN — LORATADINE 10 MG: 10 TABLET ORAL at 17:56

## 2019-08-23 RX ADMIN — CLOZAPINE 50 MG: 25 TABLET ORAL at 08:39

## 2019-08-23 RX ADMIN — THEOPHYLLINE ANHYDROUS 200 MG: 200 CAPSULE, EXTENDED RELEASE ORAL at 08:39

## 2019-08-23 RX ADMIN — TIOTROPIUM BROMIDE 18 MCG: 18 CAPSULE ORAL; RESPIRATORY (INHALATION) at 08:40

## 2019-08-23 RX ADMIN — CLOZAPINE 50 MG: 25 TABLET ORAL at 21:16

## 2019-08-23 NOTE — PLAN OF CARE
Problem: Anxiety  Goal: Anxiety is at manageable level  Description  Interventions:  - Assess and monitor patient's anxiety level  - Monitor for signs and symptoms of anxiety both physical and emotional (heart palpitations, chest pain, shortness of breath, headaches, nausea, feeling jumpy, restlessness, irritable, apprehensive)  - Collaborate with interdisciplinary team and initiate plan and interventions as ordered  - [place] patient to unit/surroundings  - Explain treatment plan  - Encourage participation in care  - Encourage verbalization of concerns/fears  - Identify coping mechanisms  - Assist in developing anxiety-reducing skills  - Administer/offer alternative therapies  - Limit or eliminate stimulants  Outcome: Progressing     Problem: Alteration in Orientation  Goal: Cooperate with recommended testing/procedures  Description  Interventions:  - Determine need for ancillary testing  - Observe for mental status changes  - Implement falls/precaution protocol   Outcome: Progressing     Problem: Alteration in Thoughts and Perception  Goal: Attend and participate in unit activities, including therapeutic, recreational, and educational groups  Description  Interventions:  - Provide therapeutic and educational activities daily, encourage attendance and participation, and document same in the medical record   Outcome: Not Progressing  Goal: Complete daily ADLs, including personal hygiene independently, as able  Description  Interventions:  - Observe, teach, and assist patient with ADLS  - Monitor and promote a balance of rest/activity, with adequate nutrition and elimination   Outcome: Not Progressing     Problem: Ineffective Coping  Goal: Participates in unit activities  Description  Interventions:  - Provide therapeutic environment   - Provide required programming   - Redirect inappropriate behaviors   Outcome: Not Progressing   Patient isolative to room all morning, out for meal and meds   Patient reported coughing up  bloody sputum, no signs of distress, vitals WNL, patient assessed by Medical at bedside, nursing to carry out orders  Patient cooperative upon approach, behaviors controlled  Patient no group attendance , poor hygiene, disheveled, encouraged to ambulate [place], participate within community and use incentive spirometer  Currently in own bed resting, will continue to monitor

## 2019-08-23 NOTE — ASSESSMENT & PLAN NOTE
Psychiatry Progress Note    Subjective: Interval History     Patient is now focused on spitting out sputum with blood in it and medical is following up on that she is still focused and preoccupied about her breathing ability even though she has been breathing fine with no visible breathing difficulties  She continues to talk in a  stilted, rambling manner and monotonous manner as usual as if talking in a court of law   She does not admit to any overt hallucinations or paranoia but still appears to harbor some covert  paranoia off and on  She still does not always trust certain nursing staff giving her the medications as she continues to believe that  the inhaler atrovent has peanut oil in it  She  still points to the lipoma on her and claims that it is a micro chip implanted on and this appears to be a fixed delusio  She has  again attending only sporadic groups  and is still focused and preoccupied about her breathing difficulty despite the fact that she is breathing fine and her pulse ox has been acceptable so far and she has been staying back to herself on her bed because of her physical complaints off and on  She feels slightly better since the clozapine was switched around but still complains about occasional drooling which has lessened with the splitting of the dosage of Clozaril  She has chosen not to to use the portable oxygen to get out of bed and go to groups if necessary  She is not talking about having special accommodations to take showers today     She is guarded suspicious evasive and in  denial of her illness as usual  No current suicidal homicidal thoughts intent or plans reported  No overt hallucinations or other delusions reported   No signs or sxs of agranulocytosis or myocarditis or endocarditis and she is moving her bowels so far with no issues     Current medications:    Current Facility-Administered Medications:     acetaminophen (TYLENOL) tablet 650 mg, 650 mg, Oral, Q6H PRN, Kwadwo Ac Paulina Berg MD    albuterol (PROVENTIL HFA,VENTOLIN HFA) inhaler 2 puff, 2 puff, Inhalation, Q4H PRN, Felisa Florence MD, 2 puff at 07/25/19 1200    benzonatate (TESSALON PERLES) capsule 100 mg, 100 mg, Oral, TID PRN, Felisa Florence MD    cloZAPine (CLOZARIL) tablet 50 mg, 50 mg, Oral, TID, Felisa Florence MD, 50 mg at 08/23/19 3086    FLUoxetine (PROzac) capsule 10 mg, 10 mg, Oral, Daily, Felisa Florence MD, 10 mg at 08/23/19 0839    fluticasone-vilanterol (BREO ELLIPTA) 200-25 MCG/INH inhaler 1 puff, 1 puff, Inhalation, Daily, Felisa Florence MD, 1 puff at 08/23/19 0840    levothyroxine tablet 125 mcg, 125 mcg, Oral, Early Morning, Felisa Florence MD, 125 mcg at 08/23/19 0622    OLANZapine (ZyPREXA) tablet 5 mg, 5 mg, Oral, Q8H PRN, Felisa Florence MD    pantoprazole (PROTONIX) EC tablet 40 mg, 40 mg, Oral, Early Morning, Felisa Florence MD, 40 mg at 08/23/19 0622    theophylline (JEF-24) 24 hr capsule 200 mg, 200 mg, Oral, Daily, Felisa Florence MD, 200 mg at 08/23/19 0839    tiotropium (SPIRIVA) capsule for inhaler 18 mcg, 18 mcg, Inhalation, Daily, Tena Pichardo MD, 18 mcg at 08/23/19 0840    traZODone (DESYREL) tablet 25 mg, 25 mg, Oral, HS PRN, Felisa Florence MD  Justification if on more than two antipsychotics:  Only on his clozapine  Side effects if any:  None    Risks , benefits, side effects and precautions of medications discussed with patient and reminded patient to let us know any problems with medications     Objective:     Vital Signs:  Vitals:    08/22/19 1915 08/22/19 2135 08/23/19 0730 08/23/19 0732   BP: 95/64  113/64 115/60   BP Location: Right arm  Left arm Left arm   Pulse: 96  81 92   Resp: 18  20    Temp: 98 2 °F (36 8 °C)  97 7 °F (36 5 °C)    TempSrc: Temporal  Temporal    SpO2: 93% 93%     Weight:       Height:         Appearance:  age appropriate, casually dressed and overweight older than stated age   Behavior:  deviant, evasive and guarded and suspicious but with good eye contact   Speech:  normal pitch and normal volume but tends to become loud at times   Mood:  anxious and dysthymic   Affect:  mood-congruent   Thought Process:  goal directed and illogical slightly pressured but able to be redirected and talks as if in a court of low being stilted   Thought Content:  delusions  grandiose and somatic about not being able to breathe despite being on nasal oxygen and paranoid about people out to harm her and Atrovent inhaler tainteded with peanut oil  No current suicidal homicidal thoughts intent or plans reported  No phobias obsessions or compulsions reported   Perceptual Disturbances: None and does not appear responding to internal stimuli   Risk Potential: Tendency to not care for herself if she goes off treatment   Sensorium:  person, place, time/date, situation, day of week, month of year and time   Cognition:  grossly intact   Consciousness:  alert and awake    Attention: Intact concentration and attention span   Intellect: Considered to be at least of average intelligence   Insight:  limited in denial   Judgment: poor      Motor Activity: no abnormal movements         Recent Labs:  Results Reviewed     None          I/O Past 24 hours:  No intake/output data recorded  No intake/output data recorded          Assessment / Plan:     Schizoaffective disorder, bipolar type ([facility] )      Reason for continued inpatient care:  Because of underlying paranoia and refusal to go back to the personal care home and inability to care for herself on her own  Acceptance by patient:  Accepting  Juan Gonzalez in recovery:  About living in another personal care home once she feels better  Understanding of medications :  Has some understanding  Involved in reintegration process:  Adjusting to the unit  Trusting in relatoinship with psychiatrist:  Trusting    Recommended Treatment:    Medication changes:  1) none today  Non-pharmacological treatments  1) start individual therapy group therapy, milieu therapy and occupational therapy and milieu therapy involving multidisciplinary team approach with psychotherapist, case management, nursing, peer support services, art therapist etc using recovery principles and psycho-education about accepting illness and need for treatment   3) check laps and encourage p o  Fluids because of low-grade fever  Safety  1) Safety/communication plan established targeting dynamic risk factors above  Discharge Plan most likely back to the a:  Personal care boarding home with act services once her delusions are managed and mood becomes more stabilized and she becomes more receptive to treatment compliance    Counseling / Coordination of Care    Total floor / unit time spent today 15 minutes  Greater than 50% of total time was spent with the patient and / or family counseling and / or coordination of care  A description of the counseling / coordination of care  Patient's Rights, confidentiality and exceptions to confidentiality, use of automated medical record, [address] [place] [address] staff access to medical record, and consent to treatment reviewed      Deep Durand MD

## 2019-08-23 NOTE — PROGRESS NOTES
Pharmacy Clozapine Monitoring Progress Note     Ama Turner is receiving 50 mg clozapine three times daily  Assessment/Plan:    Current phase of treatment is maintenance/continuation  **If clozapine therapy is interrupted for more than 48 hours, therapy MUST be restarted at the initial titration dose to avoid hypotension, bradycardia, and syncope  **    Patient is eligible to receive clozapine and the [address] [facility] monitoring frequency is weekly per clozapine REMS  The most recent [address] was   Lab Results   Component Value Date    NEUTROABS 9 30 (H) 08/22/2019    and the next lab is due on 8/30  Last Clozapine level (if available):    0   Lab Value Date/Time    CLOZAPINE 324 (L) 08/16/2019 1131     0   Lab Value Date/Time    NCLOZIP 207 08/16/2019 1131       Pharmacist Recommendations: The patient  is missing laboratory values  Based on the table below, pharmacy recommends the following: Assess CBC w/diff scheduled for 8/30/19  Also, consider ordering a repeat EKG 8/30/19 since T-wave abnormalities were evident from the EKG on 8/21  Monitoring: Adverse Effect Suggested Monitoring Patient's Status    Agranulocytosis ANC baseline and then repeat weekly for first 6 months and every 2 weeks for the second 6 months  Maintenance after one year of therapy every month  The most recent [address] was   Lab Results   Component Value Date    NEUTROABS 9 30 (H) 08/22/2019         This ANC is considered normal range (ANC >/= 1500/mcL)  Continue current treatment and monitoring course      Respiratory Depression Please ensure patient is not on concomitant respiratory lowering medications such as benzodiazepines, sedative hypnotics (eg  zolpidem) This patient is not on respiratory depression lowering medications   Myocarditis Baseline and weekly EKG, CRP, BNP, and troponin  Weekly symptomatic complaints of fatigue, dyspnea, tachycardia, chest pain, palpitations, and fever for the first 4 weeks    Last EKG Results on 8/21 showed:  T wave abnormalities    Last QTC on 8/21 was   0   Lab Value Date/Time    QTCINT 427 08/21/2019 1133         Last BNP was No results found for: NTBNP    Last Troponin was  0   Lab Value Date/Time    TROPONINI <0 01 05/01/2019 1318    TROPONINI <0 04 05/10/2015 1948        Orthostatic hypotension/  bradycardia Blood pressure and vital signs      Monitor blood pressure and orthostatic hypotension at least twice daily upon initiation and continue to follow at least once daily afterwards  /60 (BP Location: Left arm)   Pulse 92   Temp 97 7 °F (36 5 °C) (Temporal)   Resp 20   Ht 5' 4" (1 626 m)   Wt 67 6 kg (149 lb)   SpO2 93% Comment: Pre application of QHS nasal O2 @ 1L  BMI 25 58 kg/m²             Constipation (bowel obstruction) Assess at baseline and weekly during first four months of therapy  Docusate 100mg BID and Miralax 17 grams daily recommended initially  Prophylactic bowel regimen is not ordered  Sialorrhea Assess at baseline and weekly during first four months of therapy  May be managed using sublingual anticholinergic preparations (atropine 1% eye drops)  Patient was experiencing drooling which has improved   This will continue to be monitored  Please note that per current REMS protocol the provider can submit a treatment rationale that justifies clozapine treatment even if patient parameters are outside of REMS recommendations  The Clozapine REMS is an FDA-mandated program implemented by the manufacturers of clozapine  ([employer]  com/CpmgClozapineUI/home  u)  Pharmacy will continue to follow patient with team, thank you    Electronically signed by: Ximena Tolliver, Pharmacist

## 2019-08-23 NOTE — PLAN OF CARE
Problem: Alteration in Thoughts and Perception  Goal: Agree to be compliant with medication regime, as prescribed and report medication side effects  Description  Interventions:  - Offer appropriate PRN medication and supervise ingestion; conduct aims, as needed   Outcome: Progressing     Problem: PAIN - ADULT  Goal: Verbalizes/displays adequate comfort level or baseline comfort level  Description  Interventions:  - Encourage patient to monitor pain and request assistance  - Assess pain using appropriate pain scale  - Administer analgesics based on type and severity of pain and evaluate response  - Implement non-pharmacological measures as appropriate and evaluate response  - Consider cultural and social influences on pain and pain management  - Notify physician/advanced practitioner if interventions unsuccessful or patient reports new pain  Outcome: Progressing     Problem: SAFETY ADULT  Goal: Patient will remain free of falls  Description  INTERVENTIONS:  - Assess patient frequently for physical needs  -  Identify cognitive and physical deficits and behaviors that affect risk of falls    -  [place] fall precautions as indicated by assessment   - Educate patient/family on patient safety including physical limitations  - Instruct patient to call for assistance with activity based on assessment  - Modify environment to reduce risk of injury  - Consider OT/PT consult to assist with strengthening/mobility  Outcome: Progressing     Problem: RESPIRATORY - ADULT  Goal: Achieves optimal ventilation and oxygenation  Description  INTERVENTIONS:  - Assess for changes in respiratory status  - Assess for changes in mentation and behavior  - Position to facilitate oxygenation and minimize respiratory effort  - Oxygen administration by appropriate delivery method based on oxygen saturation (per order) or ABGs  - Initiate smoking cessation education as indicated  - Encourage broncho-pulmonary hygiene including cough, deep breathe, Incentive Spirometry  - Assess the need for suctioning and aspirate as needed  - Assess and instruct to report SOB or any respiratory difficulty  - Respiratory Therapy support as indicated  Outcome: Progressing   The patient slept through the night with no behaviors or issues noted  Fall precautions maintained  Med compliant  Continue to monitor

## 2019-08-23 NOTE — PROGRESS NOTES
Patient was seen and examined at bedside  Subjective:  Patient states that she has been coughing up blood tinged sputum on multiple occasions for 1 day  Admits to not feeling at baseline, with increased shortness of breath  She states that she has been using nasal canula during sleep due to shortness of breath  Denies any URI symptoms of nasal congestion, or throat pain  Denies headache, nausea, vomiting, chest pain, abdominal pain and diarrhea  ROS otherwise unremarkable  Objective:  Vitals: P: 92, RR: 20, BP: 115/60, SpO2: 93% on room air  Physical Exam:   General: Patient lying comfortably in bed  Not distressed, no dyspnea, or conversational dyspnea  2 teaspoons full of blood tinged sputum visualized in container next to patient  HEENT: Dry nasal mucus membranes, no blood visualized  Pulmonary: Clear breath sounds bilaterally  Cardiovascular: Normal s1, s2, no murmurs or gallops audible  Recent chest x-ray on 8/21/19: Atelectasis vs  New Infiltrate  CBC: WBC: 13 1, hemoglobin, hematocrit and platelet count stable  A/P:    1  Hemoptysis:   Possible pulmonary infiltrate vs  Acute viral respiratory infection vs  Chronic use of 1L O2 nasal canula at night vs  Chronic COPD cough      - Repeat CBC, PT-INR      - Order Quantiferon Gold for TB      - Repeat Chest x-ray PA & Lateral      - Start Humidified O2 at bedtime      - Montelukast 10 mg at bedtime      - Claritin 10 mg daily      - Monitor VS    2  COPD - Not in exacerbation  - Continue current management    3  Acquired Hypothyroidism  - Continue current management    4  GERD  - Continue current management    5   Schizoaffective Disorder - bipolar type  - Continue management as per primary team    Ezell Paget, MD  08/23/19  11:36 AM

## 2019-08-23 NOTE — PROGRESS NOTES
08/23/19 0900   Team Meeting   Meeting Type Daily Rounds   Team Members Present   Team Members Present Physician;Nurse;; Other (Discipline and Name)   Physician Team Member Dr Wild Wesley Team Member Carter Emerson, RN   Care Management Team Member Brenda Rodriguez   Other (Discipline and Name) Julia Duran, LS     Patient not attending groups  Refusing to perform her ADLs/hygiene, reporting that she is too sick do to this  Hasn't showered since 08/17  [facility] staff reports she "runs" to meals and snacks however  Sister visited  Reported that she had "bloody sputum" yesterday  Afibrile  Medical aware and no new orders given  Slept

## 2019-08-23 NOTE — PROGRESS NOTES
Progress Note - Madison Fast 1955, 60 y o  female MRN: [id number]    Unit/Bed#: [facility] 028-93 Encounter: [id number]    Primary Care Provider: Alison Saenz MD   Date and time admitted to hospital: 7/23/2019  5:30 PM        * Schizoaffective disorder, bipolar type [facility])  Assessment & Plan  Psychiatry Progress Note    Subjective: Interval History     Patient is now focused on spitting out sputum with blood in it and medical is following up on that she is still focused and preoccupied about her breathing ability even though she has been breathing fine with no visible breathing difficulties  She continues to talk in a  stilted, rambling manner and monotonous manner as usual as if talking in a court of law   She does not admit to any overt hallucinations or paranoia but still appears to harbor some covert  paranoia off and on  She still does not always trust certain nursing staff giving her the medications as she continues to believe that  the inhaler atrovent has peanut oil in it  She  still points to the lipoma on her and claims that it is a micro chip implanted on and this appears to be a fixed delusio  She has  again attending only sporadic groups  and is still focused and preoccupied about her breathing difficulty despite the fact that she is breathing fine and her pulse ox has been acceptable so far and she has been staying back to herself on her bed because of her physical complaints off and on  She feels slightly better since the clozapine was switched around but still complains about occasional drooling which has lessened with the splitting of the dosage of Clozaril  She has chosen not to to use the portable oxygen to get out of bed and go to groups if necessary  She is not talking about having special accommodations to take showers today     She is guarded suspicious evasive and in  denial of her illness as usual  No current suicidal homicidal thoughts intent or plans reported    No overt hallucinations or other delusions reported   No signs or sxs of agranulocytosis or myocarditis or endocarditis and she is moving her bowels so far with no issues     Current medications:    Current Facility-Administered Medications:     acetaminophen (TYLENOL) tablet 650 mg, 650 mg, Oral, Q6H PRN, Jaz Beasley MD    albuterol (PROVENTIL HFA,VENTOLIN HFA) inhaler 2 puff, 2 puff, Inhalation, Q4H PRN, Jaz Beasley MD, 2 puff at 07/25/19 1200    benzonatate (TESSALON PERLES) capsule 100 mg, 100 mg, Oral, TID PRN, Jaz Beasley MD    cloZAPine (CLOZARIL) tablet 50 mg, 50 mg, Oral, TID, Jaz Beasley MD, 50 mg at 08/23/19 6926    FLUoxetine (PROzac) capsule 10 mg, 10 mg, Oral, Daily, Jaz Beasley MD, 10 mg at 08/23/19 0839    fluticasone-vilanterol (BREO ELLIPTA) 200-25 MCG/INH inhaler 1 puff, 1 puff, Inhalation, Daily, Jaz Beasley MD, 1 puff at 08/23/19 0840    levothyroxine tablet 125 mcg, 125 mcg, Oral, Early Morning, Jaz Beasley MD, 125 mcg at 08/23/19 0622    OLANZapine (ZyPREXA) tablet 5 mg, 5 mg, Oral, Q8H PRN, Jaz Beasley MD    pantoprazole (PROTONIX) EC tablet 40 mg, 40 mg, Oral, Early Morning, Jaz Beasley MD, 40 mg at 08/23/19 0622    theophylline (JEF-24) 24 hr capsule 200 mg, 200 mg, Oral, Daily, Jaz Beasley MD, 200 mg at 08/23/19 0839    tiotropium (SPIRIVA) capsule for inhaler 18 mcg, 18 mcg, Inhalation, Daily, Martha Parker MD, 18 mcg at 08/23/19 0840    traZODone (DESYREL) tablet 25 mg, 25 mg, Oral, HS PRN, Jaz Beasley MD  Justification if on more than two antipsychotics:  Only on his clozapine  Side effects if any:  None    Risks , benefits, side effects and precautions of medications discussed with patient and reminded patient to let us know any problems with medications     Objective:     Vital Signs:  Vitals:    08/22/19 1915 08/22/19 2135 08/23/19 0730 08/23/19 0732   BP: 95/64  113/64 115/60   BP Location: Right arm  Left arm Left arm   Pulse: 96  81 92   Resp: 18  20    Temp: 98 2 °F (36 8 °C)  97 7 °F (36 5 °C)    TempSrc: Temporal  Temporal    SpO2: 93% 93%     Weight:       Height:         Appearance:  age appropriate, casually dressed and overweight older than stated age   Behavior:  deviant, evasive and guarded and suspicious but with good eye contact   Speech:  normal pitch and normal volume but tends to become loud at times   Mood:  anxious and dysthymic   Affect:  mood-congruent   Thought Process:  goal directed and illogical slightly pressured but able to be redirected and talks as if in a court of low being stilted   Thought Content:  delusions  grandiose and somatic about not being able to breathe despite being on nasal oxygen and paranoid about people out to harm her and Atrovent inhaler tainteded with peanut oil  No current suicidal homicidal thoughts intent or plans reported  No phobias obsessions or compulsions reported   Perceptual Disturbances: None and does not appear responding to internal stimuli   Risk Potential: Tendency to not care for herself if she goes off treatment   Sensorium:  person, place, time/date, situation, day of week, month of year and time   Cognition:  grossly intact   Consciousness:  alert and awake    Attention: Intact concentration and attention span   Intellect: Considered to be at least of average intelligence   Insight:  limited in denial   Judgment: poor      Motor Activity: no abnormal movements         Recent Labs:  Results Reviewed     None          I/O Past 24 hours:  No intake/output data recorded  No intake/output data recorded          Assessment / Plan:     Schizoaffective disorder, bipolar type ([facility] )      Reason for continued inpatient care:  Because of underlying paranoia and refusal to go back to the personal care home and inability to care for herself on her own  Acceptance by patient:  Accepting  Regan Keita in recovery:  About living in another personal care home once she feels better  Understanding of medications :  Has some understanding  Involved in reintegration process:  Adjusting to the unit  Trusting in relatoinship with psychiatrist:  Trusting    Recommended Treatment:    Medication changes:  1) none today  Non-pharmacological treatments  1) start individual therapy group therapy, milieu therapy and occupational therapy and milieu therapy involving multidisciplinary team approach with psychotherapist, case management, nursing, peer support services, art therapist etc using recovery principles and psycho-education about accepting illness and need for treatment   3) check laps and encourage p o  Fluids because of low-grade fever  Safety  1) Safety/communication plan established targeting dynamic risk factors above  Discharge Plan most likely back to the a:  Personal care boarding home with act services once her delusions are managed and mood becomes more stabilized and she becomes more receptive to treatment compliance    Counseling / Coordination of Care    Total floor / unit time spent today 15 minutes  Greater than 50% of total time was spent with the patient and / or family counseling and / or coordination of care  A description of the counseling / coordination of care  Patient's Rights, confidentiality and exceptions to confidentiality, use of automated medical record, [address] [place] [address] staff access to medical record, and consent to treatment reviewed      Amina Aparicio MD

## 2019-08-24 RX ADMIN — FLUOXETINE 10 MG: 10 CAPSULE ORAL at 08:53

## 2019-08-24 RX ADMIN — FLUTICASONE FUROATE AND VILANTEROL TRIFENATATE 1 PUFF: 200; 25 POWDER RESPIRATORY (INHALATION) at 08:55

## 2019-08-24 RX ADMIN — LEVOTHYROXINE SODIUM 125 MCG: 125 TABLET ORAL at 05:59

## 2019-08-24 RX ADMIN — CLOZAPINE 50 MG: 25 TABLET ORAL at 21:53

## 2019-08-24 RX ADMIN — CLOZAPINE 50 MG: 25 TABLET ORAL at 17:49

## 2019-08-24 RX ADMIN — TIOTROPIUM BROMIDE 18 MCG: 18 CAPSULE ORAL; RESPIRATORY (INHALATION) at 08:54

## 2019-08-24 RX ADMIN — LORATADINE 10 MG: 10 TABLET ORAL at 08:53

## 2019-08-24 RX ADMIN — THEOPHYLLINE ANHYDROUS 200 MG: 200 CAPSULE, EXTENDED RELEASE ORAL at 08:53

## 2019-08-24 RX ADMIN — PANTOPRAZOLE SODIUM 40 MG: 40 TABLET, DELAYED RELEASE ORAL at 05:59

## 2019-08-24 RX ADMIN — MONTELUKAST SODIUM 10 MG: 10 TABLET, COATED ORAL at 21:53

## 2019-08-24 RX ADMIN — CLOZAPINE 50 MG: 25 TABLET ORAL at 08:53

## 2019-08-24 NOTE — PROGRESS NOTES
Report given to nursing and [facility] staff aware pending labs to be drawn, [facility] staff called the lab and learned that the 5th label is for the bag to put in all four tubes for the Quantiferon TB gold plus, order of collect of 4 tubes reviewed and written instructions given with collection tubes obtained by Shan Rodriguez RN this am, two tubes additional tubes blue and purple top for PT/INR and CBC also need to be collected, fluids encouraged today

## 2019-08-24 NOTE — PROGRESS NOTES
Resp staff called, humidifier added to oxygen for PRN and HS use, Dr Lexis De Los Santos in to see Johan Godinez no new oreders received, patient education attempted, ordered labs still pending,  Jena Agrawal covering RN reported this morning that 4 attempts were made,  will continue to encourage fluids and collect Labs when she is better hydrated

## 2019-08-24 NOTE — PROGRESS NOTES
Psychiatry Progress Note    Subjective: Interval History     The patient is visible sitting out in the day room area with a peer  She has been isolative to her room often per staff  The patient is focused on getting lab work drawn  She recently spit up sputum with blood  The patient continue to be medication and meal compliant  She is rambling at times during conversation  She states she felt cold yesterday "like it was 0 degree weather outside " The patient has not been showering  She appears disheveled  She has poor insight      Behavior over the last 24 hours:  unchanged  Sleep: normal  Appetite: normal  Medication side effects: No  ROS: no complaints    Current medications:    Current Facility-Administered Medications:     acetaminophen (TYLENOL) tablet 650 mg, 650 mg, Oral, Q6H PRN, Greer Beltran MD    albuterol (PROVENTIL HFA,VENTOLIN HFA) inhaler 2 puff, 2 puff, Inhalation, Q4H PRN, Greer Beltran MD, 2 puff at 07/25/19 1200    benzonatate (TESSALON PERLES) capsule 100 mg, 100 mg, Oral, TID PRN, Greer Beltran MD    cloZAPine (CLOZARIL) tablet 50 mg, 50 mg, Oral, TID, Greer Beltran MD, 50 mg at 08/24/19 0853    FLUoxetine (PROzac) capsule 10 mg, 10 mg, Oral, Daily, Greer Beltran MD, 10 mg at 08/24/19 0853    fluticasone-vilanterol (BREO ELLIPTA) 200-25 MCG/INH inhaler 1 puff, 1 puff, Inhalation, Daily, Greer Beltran MD, 1 puff at 08/24/19 0855    levothyroxine tablet 125 mcg, 125 mcg, Oral, Early Morning, Greer Beltran MD, 125 mcg at 08/24/19 0559    loratadine (CLARITIN) tablet 10 mg, 10 mg, Oral, Daily, Mary Jane Alejandro MD, 10 mg at 08/24/19 0853    montelukast (SINGULAIR) tablet 10 mg, 10 mg, Oral, HS, Mary Jane Alejandro MD, 10 mg at 08/23/19 2116    OLANZapine (ZyPREXA) tablet 5 mg, 5 mg, Oral, Q8H PRN, Greer Beltran MD    pantoprazole (PROTONIX) EC tablet 40 mg, 40 mg, Oral, Early Morning, Greer Beltran MD, 40 mg at 08/24/19 0559    theophylline (JEF-24) 24 hr capsule 200 mg, 200 mg, Oral, Daily, Greer Beltran, MD, 200 mg at 08/24/19 0853    tiotropium (SPIRIVA) capsule for inhaler 18 mcg, 18 mcg, Inhalation, Daily, Catarino Wilder MD, 18 mcg at 08/24/19 0854    traZODone (DESYREL) tablet 25 mg, 25 mg, Oral, HS PRN, Shaggy Rangel MD    Current Problem List:    Patient Active Problem List   Diagnosis    [facility] )    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[facility] )    Tobacco use disorder, continuous    Bipolar disorder ([facility] )    Left hip pain    Lactic acidosis    Hypokalemia    Hypomagnesemia    Compression fracture of L4 lumbar vertebra    Thoracic compression fracture (HCC)    Ventral hernia    Parapneumonic effusion    Acute on chronic respiratory failure with hypoxia (HCC)    Chronic respiratory failure (HCC)    Hypophosphatemia    Elevated MCV    Schizoaffective disorder, bipolar type ([place] [address] )    Acquired hypothyroidism    Gastroesophageal reflux disease without esophagitis    Abnormal CT of the chest    Excessive cerumen in left ear canal    Lipoma of right upper extremity    Polydipsia    Localized swelling of both lower legs    Noncompliant with deep vein thrombosis (DVT) prophylaxis    Allergic conjunctivitis of right eye       Problem list reviewed 08/24/19     Objective:     Vital Signs:  Vitals:    08/23/19 1605 08/23/19 1902 08/23/19 2155 08/24/19 0900   BP: 114/70 126/69  130/84   BP Location: Left arm Left arm  Right arm   Pulse: 88 93  102   Resp: 20 18  18   Temp: (!) 97 1 °F (36 2 °C) 97 7 °F (36 5 °C)  97 5 °F (36 4 °C)   TempSrc: Temporal Temporal     SpO2: 97% 93% 93%    Weight:       Height:             Appearance:  age appropriate and casually dressed   Behavior:  evasive and guarded   Speech:  normal volume   Mood:  anxious   Affect:  mood-congruent   Thought Process:  illogical   Thought Content:  delusions  somatic   Perceptual Disturbances: None   Risk Potential: none   Sensorium:  person, place, situation and time   Cognition:  intact   Consciousness:  alert and awake    Attention: attention span and concentration were age appropriate   Intellect: average   Insight:  poor   Judgment: poor      Motor Activity: no abnormal movements       I/O Past 24 hours:  No intake/output data recorded  No intake/output data recorded  Labs:  Reviewed 08/24/19      Assessment / Plan:     Schizoaffective disorder, bipolar type ([facility] )    Recommended Treatment:      Medication changes:  1) continue current medication regimen  Non-pharmacological treatments  1) Continue with group therapy, milieu therapy and occupational therapy  Safety  1) Safety/communication plan established targeting dynamic risk factors above  2) Risks, benefits, and possible side effects of medications explained to patient and patient verbalizes understanding  Counseling / Coordination of Care    Total floor / unit time spent today 20 minutes  Greater than 50% of total time was spent with the patient and / or family counseling and / or coordination of care  A description of the counseling / coordination of care  Patient's Rights, confidentiality and exceptions to confidentiality, use of automated medical record, [address] [place] [address] staff access to medical record, and consent to treatment reviewed      Carissa Murillo PA-C

## 2019-08-24 NOTE — PLAN OF CARE
Problem: Alteration in Thoughts and Perception  Goal: Verbalize thoughts and feelings  Description  Interventions:  - Promote a nonjudgmental and trusting relationship with the patient through active listening and therapeutic communication  - Assess patient's level of functioning, behavior and potential for risk  - Engage patient in 1 on 1 interactions for a minimum of 15 minutes each session  - Encourage patient to express fears, feelings, frustrations, and discuss symptoms    - [place] patient to reality, help patient recognize reality-based thinking   - Administer medications as ordered and assess for potential side effects  - Provide the patient education related to the signs and symptoms of the illness and desired effects of prescribed medications  Outcome: Progressing  Goal: Agree to be compliant with medication regime, as prescribed and report medication side effects  Description  Interventions:  - Offer appropriate PRN medication and supervise ingestion; conduct aims, as needed   Outcome: Progressing  Goal: Attend and participate in unit activities, including therapeutic, recreational, and educational groups  Description  Interventions:  - Provide therapeutic and educational activities daily, encourage attendance and participation, and document same in the medical record   Outcome: Not Progressing  Goal: Complete daily ADLs, including personal hygiene independently, as able  Description  Interventions:  - Observe, teach, and assist patient with ADLS  - Monitor and promote a balance of rest/activity, with adequate nutrition and elimination   Outcome: Not Progressing     Problem: PAIN - ADULT  Goal: Verbalizes/displays adequate comfort level or baseline comfort level  Description  Interventions:  - Encourage patient to monitor pain and request assistance  - Assess pain using appropriate pain scale  - Administer analgesics based on type and severity of pain and evaluate response  - Implement non-pharmacological measures as appropriate and evaluate response  - Consider cultural and social influences on pain and pain management  - Notify physician/advanced practitioner if interventions unsuccessful or patient reports new pain  Outcome: Progressing     Problem: SAFETY ADULT  Goal: Patient will remain free of falls  Description  INTERVENTIONS:  - Assess patient frequently for physical needs  -  Identify cognitive and physical deficits and behaviors that affect risk of falls  -  [place] fall precautions as indicated by assessment   - Educate patient/family on patient safety including physical limitations  - Instruct patient to call for assistance with activity based on assessment  - Modify environment to reduce risk of injury  - Consider OT/PT consult to assist with strengthening/mobility  Outcome: Progressing     Problem: RESPIRATORY - ADULT  Goal: Achieves optimal ventilation and oxygenation  Description  INTERVENTIONS:  - Assess for changes in respiratory status  - Assess for changes in mentation and behavior  - Position to facilitate oxygenation and minimize respiratory effort  - Oxygen administration by appropriate delivery method based on oxygen saturation (per order) or ABGs  - Initiate smoking cessation education as indicated  - Encourage broncho-pulmonary hygiene including cough, deep breathe, Incentive Spirometry  - Assess the need for suctioning and aspirate as needed  - Assess and instruct to report SOB or any respiratory difficulty  - Respiratory Therapy support as indicated  Outcome: Benny Braxton maintained on ongoing fall and SAFE precaution without incident on this shift  She is alert and responsive upon approach  She is isolative to her room, laying in best most of the shift  She continues to make several excuses why she not walking and getting out of bed  Remains somatic in referring to her COPD, staff has not heard her cough this shift    No further blood sputum since this morning  She continues to yell out of her room for staff when she has been redirected to come to the nurses station to express her needs  Incentive spirometer was done at 1900, volume 750-1,000cm, unable to hit the volume set at 1,250cm  SPo2 92%-94%RA  Bilateral lungs clear  She is encourage to drink water throughout the shift  Still unable to the draw lab work on this shift  Will attempt tomorrow morning  Fredi Lao did not attend group this evening, which was on the deck, she opt out to lay in bed, despite multiple remainders that she needs to get out of bed  Dr Bryan Zuleta paid a visit and spoke with Fredi Lao about the importance of getting out of bed and staying hydrated  Her vitals are stable Temp 97 7  P93   /63   [email or web address] She denies depression or anxiety  No delusion or a/t/v hallucination noted    Will continue to monitor behavior pattern

## 2019-08-24 NOTE — QUICK NOTE
Subjective:  Patient seen and examined at bedside  Nurses explain that patient has not been ambulating or drinking adequate amount of water  Patient denies any acute medical complaints  She states that she is feeling better today compared to days prior but still does not feel at baseline  She denies any more episodes of hemoptysis, fever, or n/v     ROS is otherwise unremarkable  Objective:     Vitals:    08/24/19 0900   BP: 130/84   Pulse: 102   Resp: 18   Temp: 97 5 °F (36 4 °C)   SpO2:      Vitals signs are reviewed and stable  Plan:  1  Encouraged patient to ambulate several times throughout the day around the unit to decrease DVT risk  2  Encouraged patient to increased oral hydration by drinking water with food or snacks to obtain 8 - 8oz cups per day  3  Encouraged patient to use incentive spirometer due to recent possible atelectasis found on recent x-rays  4  Advised nurses for use of humidified oxygen via nasal canula at night       Marylen Cool, MD  08/24/19  1:24 PM

## 2019-08-25 RX ADMIN — FLUOXETINE 10 MG: 10 CAPSULE ORAL at 09:05

## 2019-08-25 RX ADMIN — FLUTICASONE FUROATE AND VILANTEROL TRIFENATATE 1 PUFF: 200; 25 POWDER RESPIRATORY (INHALATION) at 09:17

## 2019-08-25 RX ADMIN — MONTELUKAST SODIUM 10 MG: 10 TABLET, COATED ORAL at 21:40

## 2019-08-25 RX ADMIN — TIOTROPIUM BROMIDE 18 MCG: 18 CAPSULE ORAL; RESPIRATORY (INHALATION) at 09:17

## 2019-08-25 RX ADMIN — LORATADINE 10 MG: 10 TABLET ORAL at 09:05

## 2019-08-25 RX ADMIN — PANTOPRAZOLE SODIUM 40 MG: 40 TABLET, DELAYED RELEASE ORAL at 06:05

## 2019-08-25 RX ADMIN — CLOZAPINE 50 MG: 25 TABLET ORAL at 17:49

## 2019-08-25 RX ADMIN — CLOZAPINE 50 MG: 25 TABLET ORAL at 21:40

## 2019-08-25 RX ADMIN — THEOPHYLLINE ANHYDROUS 200 MG: 200 CAPSULE, EXTENDED RELEASE ORAL at 09:05

## 2019-08-25 RX ADMIN — CLOZAPINE 50 MG: 25 TABLET ORAL at 09:05

## 2019-08-25 RX ADMIN — LEVOTHYROXINE SODIUM 125 MCG: 125 TABLET ORAL at 06:04

## 2019-08-25 NOTE — PLAN OF CARE
Problem: Risk for Self Injury/Neglect  Goal: Refrain from harming self  Description  Interventions:  - Monitor patient closely, per order  - Develop a trusting relationship  - Supervise medication ingestion, monitor effects and side effects   Outcome: Progressing     Problem: Anxiety  Goal: Anxiety is at manageable level  Description  Interventions:  - Assess and monitor patient's anxiety level  - Monitor for signs and symptoms of anxiety both physical and emotional (heart palpitations, chest pain, shortness of breath, headaches, nausea, feeling jumpy, restlessness, irritable, apprehensive)  - Collaborate with interdisciplinary team and initiate plan and interventions as ordered  - [place] patient to unit/surroundings  - Explain treatment plan  - Encourage participation in care  - Encourage verbalization of concerns/fears  - Identify coping mechanisms  - Assist in developing anxiety-reducing skills  - Administer/offer alternative therapies  - Limit or eliminate stimulants  Outcome: Progressing     Problem: Alteration in Orientation  Goal: Interact with staff daily  Description  Interventions:  - Assess and re-assess patient's level of orientation  - Engage patient in 1 on 1 interactions, daily, for a minimum of 15 minutes   - Establish rapport/trust with patient   Outcome: Progressing     Problem: SAFETY ADULT  Goal: Patient will remain free of falls  Description  INTERVENTIONS:  - Assess patient frequently for physical needs  -  Identify cognitive and physical deficits and behaviors that affect risk of falls    -  [place] fall precautions as indicated by assessment   - Educate patient/family on patient safety including physical limitations  - Instruct patient to call for assistance with activity based on assessment  - Modify environment to reduce risk of injury  - Consider OT/PT consult to assist with strengthening/mobility  Outcome: Progressing     Problem: Alteration in Thoughts and Perception  Goal: Attend and participate in unit activities, including therapeutic, recreational, and educational groups  Description  Interventions:  - Provide therapeutic and educational activities daily, encourage attendance and participation, and document same in the medical record   Outcome: Not Progressing  Goal: Complete daily ADLs, including personal hygiene independently, as able  Description  Interventions:  - Observe, teach, and assist patient with ADLS  - Monitor and promote a balance of rest/activity, with adequate nutrition and elimination   Outcome: Not Progressing     Problem: Risk for Self Injury/Neglect  Goal: Complete daily ADLs, including personal hygiene independently, as able  Description  Interventions:  - Observe, teach, and assist patient with ADLS  - Monitor and promote a balance of rest/activity, with adequate nutrition and elimination  Outcome: Not Progressing   Priscilla Long has been in her room most of the shift  Social with staff and select peers when she does come out  Good eye contact  Brightens upon approach  No mention of shortness of breath  Comes out for meal and medication  Took all pills without difficulty, but would not take them until after supper  Ate 100% of her meal  No shower or BM tonight  Did not attend group this evening  Currently eating snack and then needs to take HS medication  Took HS medication and then had oxygen 1 liter applied  Continue to monitor  Precautions maintained

## 2019-08-25 NOTE — PROGRESS NOTES
Psychiatry Progress Note    Subjective: Interval History     The patient is sitting out in the day room area  She continues to be worried that staff will not be able to get enough blood work from her  She states she did cough up mucus yesterday but there was no blood in it  Patient continues to be somatic per staff  She is medication and meal compliant  She is social with select staff and peers  She is pleasant during discussion       Behavior over the last 24 hours:  unchanged  Sleep: normal  Appetite: normal  Medication side effects: No  ROS: no complaints    Current medications:    Current Facility-Administered Medications:     acetaminophen (TYLENOL) tablet 650 mg, 650 mg, Oral, Q6H PRN, Ervin Little MD    albuterol (PROVENTIL HFA,VENTOLIN HFA) inhaler 2 puff, 2 puff, Inhalation, Q4H PRN, Ervin Little MD, 2 puff at 07/25/19 1200    benzonatate (TESSALON PERLES) capsule 100 mg, 100 mg, Oral, TID PRN, Ervin Little MD    cloZAPine (CLOZARIL) tablet 50 mg, 50 mg, Oral, TID, Ervin Little MD, 50 mg at 08/25/19 6580    FLUoxetine (PROzac) capsule 10 mg, 10 mg, Oral, Daily, Ervin Little MD, 10 mg at 08/25/19 0905    fluticasone-vilanterol (BREO ELLIPTA) 200-25 MCG/INH inhaler 1 puff, 1 puff, Inhalation, Daily, Ervin Little MD, 1 puff at 08/25/19 0917    levothyroxine tablet 125 mcg, 125 mcg, Oral, Early Morning, Ervin Little MD, 125 mcg at 08/25/19 0604    loratadine (CLARITIN) tablet 10 mg, 10 mg, Oral, Daily, Estefania Munoz MD, 10 mg at 08/25/19 0905    montelukast (SINGULAIR) tablet 10 mg, 10 mg, Oral, HS, Estefania Munoz MD, 10 mg at 08/24/19 2153    OLANZapine (ZyPREXA) tablet 5 mg, 5 mg, Oral, Q8H PRN, Ervin Little MD    pantoprazole (PROTONIX) EC tablet 40 mg, 40 mg, Oral, Early Morning, Ervin Little MD, 40 mg at 08/25/19 0605    theophylline (JEF-24) 24 hr capsule 200 mg, 200 mg, Oral, Daily, Ervin Little MD, 200 mg at 08/25/19 0905    tiotropium (SPIRIVA) capsule for inhaler 18 mcg, 18 mcg, Inhalation, Daily, Tena Pichardo MD, 18 mcg at 08/25/19 7181    traZODone (DESYREL) tablet 25 mg, 25 mg, Oral, HS PRN, Felisa Florence MD    Current Problem List:    Patient Active Problem List   Diagnosis    [facility]is [facility] )    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    Tobacco use disorder, continuous    [facility]    Left hip pain    Lactic acidosis    Hypokalemia    Hypomagnesemia    Compression fracture of L4 lumbar vertebra    Thoracic compression fracture (HCC)    Ventral hernia    Parapneumonic effusion    Acute on chronic respiratory failure with hypoxia (HCC)    Chronic respiratory failure (HCC)    Hypophosphatemia    Elevated MCV    Schizoaffective disorder, bipolar type (HCC)    Acquired hypothyroidism    Gastroesophageal reflux disease without esophagitis    Abnormal CT of the chest    Excessive cerumen in left ear canal    Lipoma of right upper extremity    Polydipsia    Localized swelling of both lower legs    Noncompliant with deep vein thrombosis (DVT) prophylaxis    Allergic conjunctivitis of right eye       Problem list reviewed 08/25/19     Objective:     Vital Signs:  Vitals:    08/24/19 0900 08/24/19 1500 08/24/19 1900 08/25/19 0730   BP: 130/84 131/80 113/65 140/83   BP Location: Right arm Left arm Left arm Right arm   Pulse: 102 80 92 77   Resp: 18 18 17 18   Temp: 97 5 °F (36 4 °C) (!) 97 2 °F (36 2 °C) 98 8 °F (37 1 °C) (!) 97 3 °F (36 3 °C)   TempSrc:  Temporal Temporal    SpO2:  96% 97%    Weight:    68 4 kg (150 lb 12 8 oz)   Height:             Appearance:  age appropriate and casually dressed   Behavior:  evasive and guarded   Speech:  normal volume   Mood:  anxious   Affect:  mood-congruent   Thought Process:  illogical   Thought Content:  delusions  somatic   Perceptual Disturbances: None   Risk Potential: none   Sensorium:  person, place, situation and time   Cognition:  intact   Consciousness:  alert and awake    Attention: attention span and concentration were age appropriate   Intellect: average   Insight:  poor   Judgment: poor      Motor Activity: no abnormal movements       I/O Past 24 hours:  No intake/output data recorded  No intake/output data recorded  Labs:  Reviewed 08/25/19      Assessment / Plan:     [facility]ffective disorder, [facility] ([facility] )    Recommended Treatment:      Medication changes:  1) continue current medication regimen  Non-pharmacological treatments  1) Continue with group therapy, milieu therapy and occupational therapy  Safety  1) Safety/communication plan established targeting dynamic risk factors above  2) Risks, benefits, and possible side effects of medications explained to patient and patient verbalizes understanding  Counseling / Coordination of Care    Total floor / unit time spent today 20 minutes  Greater than 50% of total time was spent with the patient and / or family counseling and / or coordination of care  A description of the counseling / coordination of care  Patient's Rights, confidentiality and exceptions to confidentiality, use of automated medical record, [facility] staff access to medical record, and consent to treatment reviewed      Bisi Goddard PA-C

## 2019-08-25 NOTE — PLAN OF CARE
Problem: Alteration in Thoughts and Perception  Goal: Verbalize thoughts and feelings  Description  Interventions:  - Promote a nonjudgmental and trusting relationship with the patient through active listening and therapeutic communication  - Assess patient's level of functioning, behavior and potential for risk  - Engage patient in 1 on 1 interactions for a minimum of 15 minutes each session  - Encourage patient to express fears, feelings, frustrations, and discuss symptoms    - [place] patient to reality, help patient recognize reality-based thinking   - Administer medications as ordered and assess for potential side effects  - Provide the patient education related to the signs and symptoms of the illness and desired effects of prescribed medications  Outcome: Progressing  Goal: Agree to be compliant with medication regime, as prescribed and report medication side effects  Description  Interventions:  - Offer appropriate PRN medication and supervise ingestion; conduct aims, as needed   Outcome: Progressing     Problem: Risk for Self Injury/Neglect  Goal: Refrain from harming self  Description  Interventions:  - Monitor patient closely, per order  - Develop a trusting relationship  - Supervise medication ingestion, monitor effects and side effects   Outcome: Progressing  Goal: Complete daily ADLs, including personal hygiene independently, as able  Description  Interventions:  - Observe, teach, and assist patient with ADLS  - Monitor and promote a balance of rest/activity, with adequate nutrition and elimination  Outcome: Progressing    Appetite intact out of bed for medications and meals, did not require prompting, encouraged oral care after use of inhalers, fluids and activity encouraged, Incentive spirometer every 4 hours and PRN while awake hitting 800mL, used once this shift with encouragement from nursing staff

## 2019-08-25 NOTE — PROGRESS NOTES
Spoke with Dr Pushpa Powell covering for [facility], unable to collect all 4 of the tubes required for the quantiferon, suggested arterial draw by medical at bedside, recommended this morning the by Sandy Moreno (ER and supervisor staff)    All labs moved to Friday with routine CBC for Clozaril monitoring

## 2019-08-25 NOTE — NURSING NOTE
Incentive spirometer at HS prior to having oxygen applied  "It is not going very well tonight since I just ate and my stomach is full " Instructed on proper way to use incentive spirometer

## 2019-08-25 NOTE — PROGRESS NOTES
Med compliant and attempted to draw 4 blood tubes for quantiferon without success  Per lab all 4 tubes must be drawn from same site at same time and unable to fill tubes enough to collect them  This is the second day that only 2 tubes are able to be filled with approx 1 ml only  MD needs to be made aware that we are unable to collect   Also explained to her that she needs to shower and that she has an offensive odor that is not fair to other residents while eating in the dining room with her  Norrisyola Bennett agreed to get in the shower now and MHT with her assisting

## 2019-08-25 NOTE — PROGRESS NOTES
Bed cleaned and sheets changed, Kelsey Skinner in DR right now, 93% on RA and no distress noted   Stated she will feel better now that she has clean sheets, I told her that she needs to start taking better care of herself and that she can change her sheets herself also

## 2019-08-25 NOTE — PROGRESS NOTES
Sleeping at this time, no distress noted  OOB x1 for water  O2 remains on at 1L humidified    precautions maintained

## 2019-08-26 PROCEDURE — 99231 SBSQ HOSP IP/OBS SF/LOW 25: CPT | Performed by: PSYCHIATRY & NEUROLOGY

## 2019-08-26 RX ADMIN — CLOZAPINE 50 MG: 25 TABLET ORAL at 08:48

## 2019-08-26 RX ADMIN — THEOPHYLLINE ANHYDROUS 200 MG: 200 CAPSULE, EXTENDED RELEASE ORAL at 08:48

## 2019-08-26 RX ADMIN — LORATADINE 10 MG: 10 TABLET ORAL at 08:48

## 2019-08-26 RX ADMIN — FLUTICASONE FUROATE AND VILANTEROL TRIFENATATE 1 PUFF: 200; 25 POWDER RESPIRATORY (INHALATION) at 08:49

## 2019-08-26 RX ADMIN — LEVOTHYROXINE SODIUM 125 MCG: 125 TABLET ORAL at 06:10

## 2019-08-26 RX ADMIN — CLOZAPINE 50 MG: 25 TABLET ORAL at 21:33

## 2019-08-26 RX ADMIN — TIOTROPIUM BROMIDE 18 MCG: 18 CAPSULE ORAL; RESPIRATORY (INHALATION) at 08:50

## 2019-08-26 RX ADMIN — FLUOXETINE 10 MG: 10 CAPSULE ORAL at 08:48

## 2019-08-26 RX ADMIN — CLOZAPINE 50 MG: 25 TABLET ORAL at 17:44

## 2019-08-26 RX ADMIN — MONTELUKAST SODIUM 10 MG: 10 TABLET, COATED ORAL at 21:33

## 2019-08-26 RX ADMIN — PANTOPRAZOLE SODIUM 40 MG: 40 TABLET, DELAYED RELEASE ORAL at 06:10

## 2019-08-26 NOTE — ASSESSMENT & PLAN NOTE
Psychiatry Progress Note    Subjective: Interval History     Patient is stating the she is no longer spitting out sputum with blood in it since last Friday  and medical is following up on that and a CXR repeated came back as OK but she has to get a quantiferron test to rule out TB  She continues to be  preoccupied about her breathing ability even though she has been breathing fine with no visible breathing difficulties  She continues to talk in a  stilted, rambling  and monotonous manner as usual as if talking in a court of law but can be interrupted easily  She does not admit to any overt hallucinations or paranoia but still appears to harbor some covert  paranoia off and on based on her demeanor  She still does not always trust certain nursing staff giving her the medications as she continues to believe that  the inhaler atrovent has peanut oil in it  She  still points to the lipoma on her and still believes  that it is a micro chip implanted on  She has  again attending only sporadic groups  and is still focused and preoccupied about her breathing difficulty despite the fact that she is breathing fine and her pulse ox has been acceptable so far  Her group attendance has diminished last week to 19% because of her physical complaints off and on  She has chosen not to to use the portable oxygen to get out of bed and go to groups if necessary  She is no longer talking about having special accommodations to take showers today     She is guarded suspicious evasive and in  denial of her illness as usual  No current suicidal homicidal thoughts intent or plans reported  No overt hallucinations or other delusions reported   No signs or sxs of agranulocytosis or myocarditis or endocarditis and she is moving her bowels so far with no issues   She did attend the team meeting with n problems and encouraged to drink enough fluids so they can collect enough specimen for the qantiferron test    Current medications:    Current Facility-Administered Medications:     acetaminophen (TYLENOL) tablet 650 mg, 650 mg, Oral, Q6H PRN, Zander Yanez MD    albuterol (PROVENTIL HFA,VENTOLIN HFA) inhaler 2 puff, 2 puff, Inhalation, Q4H PRN, Zander Yanez MD, 2 puff at 07/25/19 1200    benzonatate (TESSALON PERLES) capsule 100 mg, 100 mg, Oral, TID PRN, Zander Yanez MD    cloZAPine (CLOZARIL) tablet 50 mg, 50 mg, Oral, TID, Zander Yanez MD, 50 mg at 08/26/19 0848    FLUoxetine (PROzac) capsule 10 mg, 10 mg, Oral, Daily, Zander Yanez MD, 10 mg at 08/26/19 0848    fluticasone-vilanterol (BREO ELLIPTA) 200-25 MCG/INH inhaler 1 puff, 1 puff, Inhalation, Daily, Zander Yanez MD, 1 puff at 08/26/19 0849    levothyroxine tablet 125 mcg, 125 mcg, Oral, Early Morning, Zander Yanez MD, 125 mcg at 08/26/19 0610    loratadine (CLARITIN) tablet 10 mg, 10 mg, Oral, Daily, Sallie Mccabe MD, 10 mg at 08/26/19 0848    montelukast (SINGULAIR) tablet 10 mg, 10 mg, Oral, HS, Sallie Mccabe MD, 10 mg at 08/25/19 2140    OLANZapine (ZyPREXA) tablet 5 mg, 5 mg, Oral, Q8H PRN, Zander Yanez MD    pantoprazole (PROTONIX) EC tablet 40 mg, 40 mg, Oral, Early Morning, Zander Yanez MD, 40 mg at 08/26/19 0610    theophylline (JEF-24) 24 hr capsule 200 mg, 200 mg, Oral, Daily, Zander Yanez MD, 200 mg at 08/26/19 0848    tiotropium (SPIRIVA) capsule for inhaler 18 mcg, 18 mcg, Inhalation, Daily, Sallie Mccabe MD, 18 mcg at 08/26/19 0850    traZODone (DESYREL) tablet 25 mg, 25 mg, Oral, HS PRN, Zander Yanez MD  Justification if on more than two antipsychotics:  Only on his clozapine  Side effects if any:  None    Risks , benefits, side effects and precautions of medications discussed with patient and reminded patient to let us know any problems with medications     Objective:     Vital Signs:  Vitals:    08/25/19 1630 08/25/19 1900 08/26/19 0700 08/26/19 0705   BP: 112/77 115/63 110/67 105/65   BP Location: Left arm Left arm Left arm Left arm   Pulse: 82 95 91 94   Resp: 16 17 17 17   Temp: 97 9 °F (36 6 °C) 98 3 °F (36 8 °C) 97 7 °F (36 5 °C)    TempSrc: Temporal Temporal Temporal    SpO2: 92% 92%     Weight:       Height:         Appearance:  age appropriate, casually dressed and overweight older than stated age   Behavior:  deviant, evasive and guarded and suspicious but with good eye contact   Speech:  normal pitch and normal volume but tends to become loud at times   Mood:  anxious and dysthymic   Affect:  mood-congruent   Thought Process:  goal directed and illogical slightly pressured but able to be redirected and talks as if in a court of low being stilted   Thought Content:  delusions  grandiose and somatic about not being able to breathe despite being on nasal oxygen and paranoid about people out to harm her and Atrovent inhaler tainteded with peanut oil  No current suicidal homicidal thoughts intent or plans reported  No phobias obsessions or compulsions reported   Perceptual Disturbances: None and does not appear responding to internal stimuli   Risk Potential: Tendency to not care for herself if she goes off treatment   Sensorium:  person, place, time/date, situation, day of week, month of year and time   Cognition:  grossly intact   Consciousness:  alert and awake    Attention: Intact concentration and attention span   Intellect: Considered to be at least of average intelligence   Insight:  limited in denial   Judgment: poor      Motor Activity: no abnormal movements         Recent Labs:  Results Reviewed     None          I/O Past 24 hours:  No intake/output data recorded  No intake/output data recorded          Assessment / Plan:     Schizoaffective disorder, bipolar type ([facility] )      Reason for continued inpatient care:  Because of underlying paranoia and refusal to go back to the personal care home and inability to care for herself on her own  Acceptance by patient:  Accepting  Hopefulness in recovery:  About living in another personal care home once she feels better  Understanding of medications :  Has some understanding  Involved in reintegration process:  Adjusting to the unit  Trusting in relatoinship with psychiatrist:  Trusting    Recommended Treatment:    Medication changes:  1) none today  Non-pharmacological treatments  1) start individual therapy group therapy, milieu therapy and occupational therapy and milieu therapy involving multidisciplinary team approach with psychotherapist, case management, nursing, peer support services, art therapist etc using recovery principles and psycho-education about accepting illness and need for treatment   3) check laps and encourage p o  Fluids because of low-grade fever  Safety  1) Safety/communication plan established targeting dynamic risk factors above  Discharge Plan most likely back to the a:  Personal care boarding home with act services once her delusions are managed and mood becomes more stabilized and she becomes more receptive to treatment compliance    Counseling / Coordination of Care    Total floor / unit time spent today 15 minutes  Greater than 50% of total time was spent with the patient and / or family counseling and / or coordination of care  A description of the counseling / coordination of care  Patient's Rights, confidentiality and exceptions to confidentiality, use of automated medical record, [facility] record, and consent to treatment reviewed      Ivonne Nevarez MD

## 2019-08-26 NOTE — ASSESSMENT & PLAN NOTE
Psychiatry Progress Note    Subjective: Interval History     Patient refused to get the PPD test done again claiming that she had it done in March and she is afraid that she is going to die if she  takes that test again again points towards her underlying paranoia  I did try to remind her that the QuantiFERON test was to rule out TB and since we cannot get in a blood sample that is why we ordered the PPD but she is absolutely refusing to get it done right now    She continues to be  preoccupied about her breathing ability even though she has been breathing fine with no visible breathing difficulties and has been using nasal oxygen at night    She continues to talk in a loud stilted, rambling  and monotonous manner as usual as if talking in a court of law but can be interrupted easily  She does not admit to any overt hallucinations or paranoia but still appears to harbor some covert  paranoia off and on based on her demeanor  She still does not always trust certain nursing staff giving her the medications as she continues to believe that  the inhaler atrovent has peanut oil in it and has been suspicious in her interactions with the staff  She hardly attends any groups even though she knows that she needs to attend some groups to be able to get privileges  She  still points to the lipoma on her and still believes  that it is a micro chip implanted on and this has not changed since she came to the unit  She has chosen not to to use the portable oxygen to get out of bed and go to groups if necessary  She is no longer talking about having special accommodations to take showers today     She is guarded suspicious evasive and in  denial of her illness as usual  No current suicidal homicidal thoughts intent or plans reported  No overt hallucinations or other delusions reported   No signs or sxs of agranulocytosis or myocarditis or endocarditis and she is moving her bowels so far with no issues   She did attend the team meeting with n problems and encouraged to drink enough fluids so they can collect enough specimen for the qantiferron test since she is refusing to get the PPD test done   Current medications:    Current Facility-Administered Medications:     acetaminophen (TYLENOL) tablet 650 mg, 650 mg, Oral, Q6H PRN, Jill Gayle MD    albuterol (PROVENTIL HFA,VENTOLIN HFA) inhaler 2 puff, 2 puff, Inhalation, Q4H PRN, Jill Gayle MD, 2 puff at 07/25/19 1200    benzonatate (TESSALON PERLES) capsule 100 mg, 100 mg, Oral, TID PRN, Jill Gayle MD    cloZAPine (CLOZARIL) tablet 50 mg, 50 mg, Oral, TID, Jill Gayle MD, 50 mg at 08/26/19 0848    FLUoxetine (PROzac) capsule 10 mg, 10 mg, Oral, Daily, Jill Gayle MD, 10 mg at 08/26/19 0848    fluticasone-vilanterol (BREO ELLIPTA) 200-25 MCG/INH inhaler 1 puff, 1 puff, Inhalation, Daily, Jill Gayle MD, 1 puff at 08/26/19 0849    levothyroxine tablet 125 mcg, 125 mcg, Oral, Early Morning, Jill Gayle MD, 125 mcg at 08/26/19 0610    loratadine (CLARITIN) tablet 10 mg, 10 mg, Oral, Daily, Rakesh Moreno MD, 10 mg at 08/26/19 0848    montelukast (SINGULAIR) tablet 10 mg, 10 mg, Oral, HS, Rakesh Moreno MD, 10 mg at 08/25/19 2140    OLANZapine (ZyPREXA) tablet 5 mg, 5 mg, Oral, Q8H PRN, Jill Gayle MD    pantoprazole (PROTONIX) EC tablet 40 mg, 40 mg, Oral, Early Morning, Jill Gayle MD, 40 mg at 08/26/19 0610    theophylline (JEF-24) 24 hr capsule 200 mg, 200 mg, Oral, Daily, Jill Gayle MD, 200 mg at 08/26/19 0848    tiotropium (SPIRIVA) capsule for inhaler 18 mcg, 18 mcg, Inhalation, Daily, Rakesh Moreno MD, 18 mcg at 08/26/19 0850    traZODone (DESYREL) tablet 25 mg, 25 mg, Oral, HS PRN, Jill Gayle MD  Justification if on more than two antipsychotics:  Only on his clozapine  Side effects if any:  None    Risks , benefits, side effects and precautions of medications discussed with patient and reminded patient to let us know any problems with medications     Objective: Vital Signs:  Vitals:    08/25/19 1630 08/25/19 1900 08/26/19 0700 08/26/19 0705   BP: 112/77 115/63 110/67 105/65   BP Location: Left arm Left arm Left arm Left arm   Pulse: 82 95 91 94   Resp: 16 17 17 17   Temp: 97 9 °F (36 6 °C) 98 3 °F (36 8 °C) 97 7 °F (36 5 °C)    TempSrc: Temporal Temporal Temporal    SpO2: 92% 92%     Weight:       Height:         Appearance:  age appropriate, casually dressed and overweight older than stated age   Behavior:  deviant, evasive and guarded and suspicious but with good eye contact   Speech:  normal pitch and normal volume but tends to become loud at times   Mood:  anxious and dysthymic   Affect:  mood-congruent   Thought Process:  goal directed and illogical slightly pressured but able to be redirected and talks as if in a court of low being stilted   Thought Content:  delusions  grandiose and somatic about not being able to breathe despite being on nasal oxygen and paranoid about people out to harm her and Atrovent inhaler tainteded with peanut oil  No current suicidal homicidal thoughts intent or plans reported  No phobias obsessions or compulsions reported   Perceptual Disturbances: None and does not appear responding to internal stimuli   Risk Potential: Tendency to not care for herself if she goes off treatment   Sensorium:  person, place, time/date, situation, day of week, month of year and time   Cognition:  grossly intact   Consciousness:  alert and awake    Attention: Intact concentration and attention span   Intellect: Considered to be at least of average intelligence   Insight:  limited in denial   Judgment: poor      Motor Activity: no abnormal movements         Recent Labs:  Results Reviewed     None          I/O Past 24 hours:  No intake/output data recorded  No intake/output data recorded          Assessment / Plan:     Schizoaffective disorder, bipolar type [facility]      Reason for continued inpatient care:  Because of underlying paranoia and refusal to go back to the personal care home and inability to care for herself on her own  Acceptance by patient:  Accepting  [place] in recovery:  About living in another personal care home once she feels better  Understanding of medications :  Has some understanding  Involved in reintegration process:  Adjusting to the unit  Trusting in relatoinship with psychiatrist:  Trusting    Recommended Treatment:    Medication changes:  1) none today  Non-pharmacological treatments  1) start individual therapy group therapy, milieu therapy and occupational therapy and milieu therapy involving multidisciplinary team approach with psychotherapist, case management, nursing, peer support services, art therapist etc using recovery principles and psycho-education about accepting illness and need for treatment   3) check laps and encourage p o  Fluids because of low-grade fever  Safety  1) Safety/communication plan established targeting dynamic risk factors above  Discharge Plan most likely back to the a:  Personal care boarding home with act services once her delusions are managed and mood becomes more stabilized and she becomes more receptive to treatment compliance    Counseling / Coordination of Care    Total floor / unit time spent today 15 minutes  Greater than 50% of total time was spent with the patient and / or family counseling and / or coordination of care  A description of the counseling / coordination of care  Patient's Rights, confidentiality and exceptions to confidentiality, use of automated medical record, [facility] staff access to medical record, and consent to treatment reviewed      Isidoro Gonzalez MD

## 2019-08-26 NOTE — PLAN OF CARE
Problem: DISCHARGE PLANNING  Goal: Discharge to home or other facility with appropriate resources  Description  INTERVENTIONS:  - Conduct assessment to determine patient/family and health care team treatment goals, and need for post-acute services based on payer coverage, community resources, and patient preferences, and barriers to discharge  - Address psychosocial, clinical, and financial barriers to discharge as identified in assessment in conjunction with the patient/family and health care team  - Assist the patient in reintegration back into the community by removing barriers which may hinder a successful discharge once deemed stable  - Arrange appropriate level of post-acute services according to patient's needs and preference and payer coverage in collaboration with the physician and health care team  - Communicate with and update the patient/family, physician, and health care team regarding progress on the discharge plan  - Arrange appropriate transportation to post-acute venues    Outcome: Not Progressing     CM reached out to Haseeb Vasquez with [address] inquiring about patient's belongings per patient's request  CM will await her response  CM will continue to follow patient's progress and assist with discharge planning needs

## 2019-08-26 NOTE — PLAN OF CARE
Problem: Ineffective Coping  Goal: Participates in unit activities  Description  Interventions:  - Provide therapeutic environment   - Provide required programming   - Redirect inappropriate behaviors   Outcome: Progressing     Problem: Risk for Self Injury/Neglect  Goal: Verbalize thoughts and feelings  Description  Interventions:  - Assess and re-assess patient's lethality and potential for self-injury  - Engage patient in 1:1 interactions, daily, for a minimum of 15 minutes  - Encourage patient to express feelings, fears, frustrations, hopes  - Establish rapport/trust with patient   Outcome: Progressing     Problem: Anxiety  Goal: Anxiety is at manageable level  Description  Interventions:  - Assess and monitor patient's anxiety level  - Monitor for signs and symptoms of anxiety both physical and emotional (heart palpitations, chest pain, shortness of breath, headaches, nausea, feeling jumpy, restlessness, irritable, apprehensive)  - Collaborate with interdisciplinary team and initiate plan and interventions as ordered  - [place] patient to unit/surroundings  - Explain treatment plan  - Encourage participation in care  - Encourage verbalization of concerns/fears  - Identify coping mechanisms  - Assist in developing anxiety-reducing skills  - Administer/offer alternative therapies  - Limit or eliminate stimulants  Outcome: Progressing     Problem: Alteration in Orientation  Goal: Interact with staff daily  Description  Interventions:  - Assess and re-assess patient's level of orientation  - Engage patient in 1 on 1 interactions, daily, for a minimum of 15 minutes   - Establish rapport/trust with patient   Outcome: Progressing     Problem: SAFETY ADULT  Goal: Patient will remain free of falls  Description  INTERVENTIONS:  - Assess patient frequently for physical needs  -  Identify cognitive and physical deficits and behaviors that affect risk of falls    -  [place] fall precautions as indicated by assessment   - Educate patient/family on patient safety including physical limitations  - Instruct patient to call for assistance with activity based on assessment  - Modify environment to reduce risk of injury  - Consider OT/PT consult to assist with strengthening/mobility  Outcome: Progressing     Problem: Ineffective Coping  Goal: Patient/Family verbalizes awareness of resources  Outcome: Adequate for Discharge    Fall Precautions maintained  Attended IMR group today  Appetite intact out of bed for medications and meals, did not require prompting, encouraged oral care after use of inhalers, fluids and activity encouraged used Incentive spirometer, once this shift with encouragement from nursing staff, labwork re Quantiferon to r/o TB may be discontinued by [facility] will follow up again later this week (chest Xray was completed and PPD done on 2-14-19 still looking at MR from that admission see that the result was read and documented by nursing staff, patient states that the result was negative and she is refusing the repeat PPD ordered by Dr Lopez Trinidad today     8/26/19 3-11 Report  SAFE, FALL  BM-8/25  SH- 8/25 Groups-

## 2019-08-26 NOTE — PROGRESS NOTES
08/26/19 0900   Team Meeting   Meeting Type Daily Rounds   Team Members Present   Team Members Present Physician;Nurse;   Physician Team Member Dr Josh Mayfield Team Member Idalia Rosenberg RN   Care Management Team Member Brenda Rodriguez   Other (Discipline and Name) Maria De Jesus Higginbotham [place]; SHANIKA Mayo     No behavioral issues noted over the weekend  Medical following her due to "bloody sputum"  Encouraged to increase fluids and movement out of bed

## 2019-08-26 NOTE — PROGRESS NOTES
Progress Note - Madison Miner 1955, 60 y o  female MRN: [id number]    [facility] [place] 202-47 Encounter: [id number]    Primary Care Provider: Alison Saenz MD   Date and time admitted to hospital: 7/23/2019  5:30 PM        * Schizoaffective disorder, bipolar type [facility])  Assessment & Plan  Psychiatry Progress Note    Subjective: Interval History     Patient is stating the she is no longer spitting out sputum with blood in it since last Friday  and medical is following up on that and a CXR repeated came back as OK but she has to get a quantiferron test to rule out TB  She continues to be  preoccupied about her breathing ability even though she has been breathing fine with no visible breathing difficulties  She continues to talk in a  stilted, rambling  and monotonous manner as usual as if talking in a court of law but can be interrupted easily  She does not admit to any overt hallucinations or paranoia but still appears to harbor some covert  paranoia off and on based on her demeanor  She still does not always trust certain nursing staff giving her the medications as she continues to believe that  the inhaler atrovent has peanut oil in it  She  still points to the lipoma on her and still believes  that it is a micro chip implanted on  She has  again attending only sporadic groups  and is still focused and preoccupied about her breathing difficulty despite the fact that she is breathing fine and her pulse ox has been acceptable so far  Her group attendance has diminished last week to 19% because of her physical complaints off and on  She has chosen not to to use the portable oxygen to get out of bed and go to groups if necessary  She is no longer talking about having special accommodations to take showers today     She is guarded suspicious evasive and in  denial of her illness as usual  No current suicidal homicidal thoughts intent or plans reported    No overt hallucinations or other delusions reported   No signs or sxs of agranulocytosis or myocarditis or endocarditis and she is moving her bowels so far with no issues   She did attend the team meeting with n problems and encouraged to drink enough fluids so they can collect enough specimen for the qantiferron test    Current medications:    Current Facility-Administered Medications:     acetaminophen (TYLENOL) tablet 650 mg, 650 mg, Oral, Q6H PRN, Jeffrey Gallegos MD    albuterol (PROVENTIL HFA,VENTOLIN HFA) inhaler 2 puff, 2 puff, Inhalation, Q4H PRN, Jeffrey Gallegos MD, 2 puff at 07/25/19 1200    benzonatate (TESSALON PERLES) capsule 100 mg, 100 mg, Oral, TID PRN, Jeffrey Gallegos MD    cloZAPine (CLOZARIL) tablet 50 mg, 50 mg, Oral, TID, Jeffrey Gallegos MD, 50 mg at 08/26/19 0848    FLUoxetine (PROzac) capsule 10 mg, 10 mg, Oral, Daily, Jeffrey Gallegos MD, 10 mg at 08/26/19 0848    fluticasone-vilanterol (BREO ELLIPTA) 200-25 MCG/INH inhaler 1 puff, 1 puff, Inhalation, Daily, Jeffrey Gallegos MD, 1 puff at 08/26/19 0849    levothyroxine tablet 125 mcg, 125 mcg, Oral, Early Morning, Jeffrey Gallegos MD, 125 mcg at 08/26/19 0610    loratadine (CLARITIN) tablet 10 mg, 10 mg, Oral, Daily, Jessica Magana MD, 10 mg at 08/26/19 0848    montelukast (SINGULAIR) tablet 10 mg, 10 mg, Oral, HS, Jessica Magana MD, 10 mg at 08/25/19 2140    OLANZapine (ZyPREXA) tablet 5 mg, 5 mg, Oral, Q8H PRN, Jeffrey Gallegos MD    pantoprazole (PROTONIX) EC tablet 40 mg, 40 mg, Oral, Early Morning, Jeffrey Gallegos MD, 40 mg at 08/26/19 0610    theophylline (JEF-24) 24 hr capsule 200 mg, 200 mg, Oral, Daily, Jeffrey Gallegos MD, 200 mg at 08/26/19 0848    tiotropium (SPIRIVA) capsule for inhaler 18 mcg, 18 mcg, Inhalation, Daily, Jessica Magana MD, 18 mcg at 08/26/19 0850    traZODone (DESYREL) tablet 25 mg, 25 mg, Oral, HS PRN, Jeffrey Gallegos MD  Justification if on more than two antipsychotics:  Only on his clozapine  Side effects if any:  None    Risks , benefits, side effects and precautions of medications discussed with patient and reminded patient to let us know any problems with medications     Objective:     Vital Signs:  Vitals:    08/25/19 1630 08/25/19 1900 08/26/19 0700 08/26/19 0705   BP: 112/77 115/63 110/67 105/65   BP Location: Left arm Left arm Left arm Left arm   Pulse: 82 95 91 94   Resp: 16 17 17 17   Temp: 97 9 °F (36 6 °C) 98 3 °F (36 8 °C) 97 7 °F (36 5 °C)    TempSrc: Temporal Temporal Temporal    SpO2: 92% 92%     Weight:       Height:         Appearance:  age appropriate, casually dressed and overweight older than stated age   Behavior:  deviant, evasive and guarded and suspicious but with good eye contact   Speech:  normal pitch and normal volume but tends to become loud at times   Mood:  anxious and dysthymic   Affect:  mood-congruent   Thought Process:  goal directed and illogical slightly pressured but able to be redirected and talks as if in a court of low being stilted   Thought Content:  delusions  grandiose and somatic about not being able to breathe despite being on nasal oxygen and paranoid about people out to harm her and Atrovent inhaler tainteded with peanut oil  No current suicidal homicidal thoughts intent or plans reported  No phobias obsessions or compulsions reported   Perceptual Disturbances: None and does not appear responding to internal stimuli   Risk Potential: Tendency to not care for herself if she goes off treatment   Sensorium:  person, place, time/date, situation, day of week, month of year and time   Cognition:  grossly intact   Consciousness:  alert and awake    Attention: Intact concentration and attention span   Intellect: Considered to be at least of average intelligence   Insight:  limited in denial   Judgment: poor      Motor Activity: no abnormal movements         Recent Labs:  Results Reviewed     None          I/O Past 24 hours:  No intake/output data recorded  No intake/output data recorded          Assessment / Plan:     Schizoaffective disorder, bipolar type ([facility] [address] )      Reason for continued inpatient care:  Because of underlying paranoia and refusal to go back to the personal care home and inability to care for herself on her own  Acceptance by patient:  Accepting  Jenn Calhoun in recovery:  About living in another personal care home once she feels better  Understanding of medications :  Has some understanding  Involved in reintegration process:  Adjusting to the unit  Trusting in relatoinship with psychiatrist:  Trusting    Recommended Treatment:    Medication changes:  1) none today  Non-pharmacological treatments  1) start individual therapy group therapy, milieu therapy and occupational therapy and milieu therapy involving multidisciplinary team approach with psychotherapist, case management, nursing, peer support services, art therapist etc using recovery principles and psycho-education about accepting illness and need for treatment   3) check laps and encourage p o  Fluids because of low-grade fever  Safety  1) Safety/communication plan established targeting dynamic risk factors above  Discharge Plan most likely back to the a:  Personal care boarding home with act services once her delusions are managed and mood becomes more stabilized and she becomes more receptive to treatment compliance    Counseling / Coordination of Care    Total floor / unit time spent today 15 minutes  Greater than 50% of total time was spent with the patient and / or family counseling and / or coordination of care  A description of the counseling / coordination of care  Patient's Rights, confidentiality and exceptions to confidentiality, use of automated medical record, [facility] staff access to medical record, and consent to treatment reviewed      Meldon Curling, MD

## 2019-08-26 NOTE — PLAN OF CARE
Problem: Alteration in Thoughts and Perception  Goal: Agree to be compliant with medication regime, as prescribed and report medication side effects  Description  Interventions:  - Offer appropriate PRN medication and supervise ingestion; conduct aims, as needed   Outcome: Progressing  Goal: Attend and participate in unit activities, including therapeutic, recreational, and educational groups  Description  Interventions:  - Provide therapeutic and educational activities daily, encourage attendance and participation, and document same in the medical record   Outcome: Progressing  Goal: Complete daily ADLs, including personal hygiene independently, as able  Description  Interventions:  - Observe, teach, and assist patient with ADLS  - Monitor and promote a balance of rest/activity, with adequate nutrition and elimination   Outcome: Progressing     Problem: Risk for Self Injury/Neglect  Goal: Refrain from harming self  Description  Interventions:  - Monitor patient closely, per order  - Develop a trusting relationship  - Supervise medication ingestion, monitor effects and side effects   Outcome: Progressing     Problem: Depression  Goal: Refrain from self-neglect  Outcome: Progressing     Problem: Anxiety  Goal: Anxiety is at manageable level  Description  Interventions:  - Assess and monitor patient's anxiety level  - Monitor for signs and symptoms of anxiety both physical and emotional (heart palpitations, chest pain, shortness of breath, headaches, nausea, feeling jumpy, restlessness, irritable, apprehensive)  - Collaborate with interdisciplinary team and initiate plan and interventions as ordered    - [place] patient to unit/surroundings  - Explain treatment plan  - Encourage participation in care  - Encourage verbalization of concerns/fears  - Identify coping mechanisms  - Assist in developing anxiety-reducing skills  - Administer/offer alternative therapies  - Limit or eliminate stimulants  Outcome: Progressing     Problem: Alteration in Orientation  Goal: Interact with staff daily  Description  Interventions:  - Assess and re-assess patient's level of orientation  - Engage patient in 1 on 1 interactions, daily, for a minimum of 15 minutes   - Establish rapport/trust with patient   Outcome: Progressing     Problem: SAFETY ADULT  Goal: Patient will remain free of falls  Description  INTERVENTIONS:  - Assess patient frequently for physical needs  -  Identify cognitive and physical deficits and behaviors that affect risk of falls  -  [place] fall precautions as indicated by assessment   - Educate patient/family on patient safety including physical limitations  - Instruct patient to call for assistance with activity based on assessment  - Modify environment to reduce risk of injury  - Consider OT/PT consult to assist with strengthening/mobility  Outcome: Ronel Flores has been in her room most of the shift  Social with select peers when out for medication and meal/snack  Good eye contact  Brightens upon approach  Pleasant and cooperative  Incentive spirometer used at the beginning of the shift  She got it to 1000  Took pills after meal (her preference even though she was reminded that they were due at 1600, before meal time)  Ate 100% of her meal then went to her room  Naps at intervals  No shower or BM this shift  VSS  Did not complain of shortness of breath  Ate snack and then took her HS medication  Did incentive spirometer tonight ([id number])  Oxygen saturation 90-92% prior to oxygen 1 liter nasal cannula applied for the night  Continue to monitor  Precautions maintained

## 2019-08-26 NOTE — PROGRESS NOTES
Individual attended and participated in treatment team meeting  She reported that she feels that " these" are not the right meds", but is unable to rationalize why she feels this way  She struggles to stay recovery focused, will only verbalize about physical status

## 2019-08-26 NOTE — PROGRESS NOTES
08/26/19 1200   Team Meeting   Meeting Type Tx Team Meeting   Initial Conference Date 08/26/19   Next Conference Date 09/02/19   Team Members Present   Team Members Present Physician;Nurse;; Other (Discipline and Name)   Physician Team Member Dr Chanell Martell, RN   Care Management Team Member Brenda Oliveros   Other (Discipline and Name) [facility]; Bonilla Ulloa, [address] - Certified peer; Neha [facility] REYES   Patient/Family Present   Patient Present Yes   Patient's Family Present No     Patient attended treatment team meeting this morning without self-assessment  Patient's group attendance for last week was 19%, but she was sick last week  Patient verbalizes coping skills as walking more, staying out of her room, and drinking more water  She is still fixated with physical concerns,though they appear to have resolved and she is no longer complaining of bloody sputum  Patient reports her goals for the week as getting to groups and showering  Patient was visited by family over the weekend which she reports went well  Team and patient completed risk assessment and the patient did not verbalize any desire to elope from the program   Patient verbalized understanding of consequences of eloping from treatment while on a commitment  Patient verbalized no further questions or concerns at the conclusion of the meeting  Next team meeting scheduled for 9/2

## 2019-08-26 NOTE — PLAN OF CARE
Problem: PAIN - ADULT  Goal: Verbalizes/displays adequate comfort level or baseline comfort level  Description  Interventions:  - Encourage patient to monitor pain and request assistance  - Assess pain using appropriate pain scale  - Administer analgesics based on type and severity of pain and evaluate response  - Implement non-pharmacological measures as appropriate and evaluate response  - Consider cultural and social influences on pain and pain management  - Notify physician/advanced practitioner if interventions unsuccessful or patient reports new pain  Outcome: Progressing     Problem: SAFETY ADULT  Goal: Patient will remain free of falls  Description  INTERVENTIONS:  - Assess patient frequently for physical needs  -  Identify cognitive and physical deficits and behaviors that affect risk of falls    -  [place] fall precautions as indicated by assessment   - Educate patient/family on patient safety including physical limitations  - Instruct patient to call for assistance with activity based on assessment  - Modify environment to reduce risk of injury  - Consider OT/PT consult to assist with strengthening/mobility  Outcome: Progressing     Problem: RESPIRATORY - ADULT  Goal: Achieves optimal ventilation and oxygenation  Description  INTERVENTIONS:  - Assess for changes in respiratory status  - Assess for changes in mentation and behavior  - Position to facilitate oxygenation and minimize respiratory effort  - Oxygen administration by appropriate delivery method based on oxygen saturation (per order) or ABGs  - Initiate smoking cessation education as indicated  - Encourage broncho-pulmonary hygiene including cough, deep breathe, Incentive Spirometry  - Assess the need for suctioning and aspirate as needed  - Assess and instruct to report SOB or any respiratory difficulty  - Respiratory Therapy support as indicated  Outcome: Mynor Croft maintained on ongoing fall and SAFE precaution without incident on this shift  Laying in bed with her eyes closed, breath even and unlabored with o2 @1L/m with the humidification  No sign or symptom of respiratory distress noted  Mariana Hernandes continues to remain free from fall  Denies any pain or discomfort  Thus far she is in stabled condition  Q 15 minutes checks during the night continues  No behavioral noted  Will continue to monitor behavioral, respiratory, and sleep pattern

## 2019-08-27 PROCEDURE — 99231 SBSQ HOSP IP/OBS SF/LOW 25: CPT | Performed by: PSYCHIATRY & NEUROLOGY

## 2019-08-27 RX ORDER — CLOZAPINE 25 MG/1
75 TABLET ORAL
Status: DISCONTINUED | OUTPATIENT
Start: 2019-08-27 | End: 2019-08-27

## 2019-08-27 RX ORDER — AMMONIUM LACTATE 12 G/100G
1 LOTION TOPICAL 2 TIMES DAILY PRN
Status: DISCONTINUED | OUTPATIENT
Start: 2019-08-27 | End: 2020-01-01 | Stop reason: HOSPADM

## 2019-08-27 RX ORDER — FLUTICASONE FUROATE AND VILANTEROL 200; 25 UG/1; UG/1
1 POWDER RESPIRATORY (INHALATION) DAILY
Status: DISCONTINUED | OUTPATIENT
Start: 2019-08-27 | End: 2019-08-27

## 2019-08-27 RX ORDER — OLANZAPINE 10 MG/1
5 INJECTION, POWDER, LYOPHILIZED, FOR SOLUTION INTRAMUSCULAR EVERY 8 HOURS PRN
Status: DISCONTINUED | OUTPATIENT
Start: 2019-08-27 | End: 2020-01-01 | Stop reason: HOSPADM

## 2019-08-27 RX ORDER — POLYVINYL ALCOHOL 14 MG/ML
1 SOLUTION/ DROPS OPHTHALMIC
Status: DISCONTINUED | OUTPATIENT
Start: 2019-08-27 | End: 2020-01-01 | Stop reason: HOSPADM

## 2019-08-27 RX ORDER — FLUOXETINE 10 MG/1
10 CAPSULE ORAL DAILY
Status: DISCONTINUED | OUTPATIENT
Start: 2019-08-27 | End: 2019-08-27

## 2019-08-27 RX ORDER — TRAZODONE HYDROCHLORIDE 50 MG/1
25 TABLET ORAL
Status: DISCONTINUED | OUTPATIENT
Start: 2019-08-27 | End: 2019-08-27

## 2019-08-27 RX ORDER — MAGNESIUM HYDROXIDE/ALUMINUM HYDROXICE/SIMETHICONE 120; 1200; 1200 MG/30ML; MG/30ML; MG/30ML
15 SUSPENSION ORAL EVERY 4 HOURS PRN
Status: DISCONTINUED | OUTPATIENT
Start: 2019-08-27 | End: 2020-01-01 | Stop reason: HOSPADM

## 2019-08-27 RX ORDER — ACETAMINOPHEN 325 MG/1
650 TABLET ORAL EVERY 6 HOURS PRN
Status: DISCONTINUED | OUTPATIENT
Start: 2019-08-27 | End: 2019-08-27

## 2019-08-27 RX ORDER — HALOPERIDOL 5 MG/ML
5 INJECTION INTRAMUSCULAR DAILY PRN
Status: DISCONTINUED | OUTPATIENT
Start: 2019-08-27 | End: 2019-08-27

## 2019-08-27 RX ORDER — LEVOTHYROXINE SODIUM 0.12 MG/1
125 TABLET ORAL
Status: DISCONTINUED | OUTPATIENT
Start: 2019-08-27 | End: 2019-08-27

## 2019-08-27 RX ORDER — EPINEPHRINE 0.15 MG/.3ML
0.15 INJECTION INTRAMUSCULAR ONCE AS NEEDED
Status: DISCONTINUED | OUTPATIENT
Start: 2019-08-27 | End: 2019-08-27 | Stop reason: SDUPTHER

## 2019-08-27 RX ORDER — KETOTIFEN FUMARATE 0.35 MG/ML
1 SOLUTION/ DROPS OPHTHALMIC 2 TIMES DAILY PRN
Status: DISCONTINUED | OUTPATIENT
Start: 2019-08-27 | End: 2020-01-01 | Stop reason: HOSPADM

## 2019-08-27 RX ORDER — PANTOPRAZOLE SODIUM 40 MG/1
40 TABLET, DELAYED RELEASE ORAL
Status: DISCONTINUED | OUTPATIENT
Start: 2019-08-27 | End: 2019-08-27

## 2019-08-27 RX ORDER — BENZTROPINE MESYLATE 1 MG/ML
1 INJECTION INTRAMUSCULAR; INTRAVENOUS EVERY 8 HOURS PRN
Status: DISCONTINUED | OUTPATIENT
Start: 2019-08-27 | End: 2020-01-01 | Stop reason: HOSPADM

## 2019-08-27 RX ORDER — ALBUTEROL SULFATE 90 UG/1
2 AEROSOL, METERED RESPIRATORY (INHALATION) EVERY 4 HOURS PRN
Status: DISCONTINUED | OUTPATIENT
Start: 2019-08-27 | End: 2019-08-27

## 2019-08-27 RX ORDER — BENZONATATE 100 MG/1
100 CAPSULE ORAL 3 TIMES DAILY PRN
Status: DISCONTINUED | OUTPATIENT
Start: 2019-08-27 | End: 2019-08-27

## 2019-08-27 RX ORDER — EPINEPHRINE 1 MG/ML
0.15 INJECTION, SOLUTION, CONCENTRATE INTRAVENOUS ONCE AS NEEDED
Status: DISCONTINUED | OUTPATIENT
Start: 2019-08-27 | End: 2020-01-01 | Stop reason: HOSPADM

## 2019-08-27 RX ORDER — POLYETHYLENE GLYCOL 3350 17 G/17G
17 POWDER, FOR SOLUTION ORAL DAILY PRN
Status: DISCONTINUED | OUTPATIENT
Start: 2019-08-27 | End: 2020-01-01 | Stop reason: HOSPADM

## 2019-08-27 RX ADMIN — LORATADINE 10 MG: 10 TABLET ORAL at 08:39

## 2019-08-27 RX ADMIN — LEVOTHYROXINE SODIUM 125 MCG: 125 TABLET ORAL at 06:34

## 2019-08-27 RX ADMIN — CLOZAPINE 50 MG: 25 TABLET ORAL at 17:38

## 2019-08-27 RX ADMIN — CLOZAPINE 50 MG: 25 TABLET ORAL at 21:27

## 2019-08-27 RX ADMIN — MONTELUKAST SODIUM 10 MG: 10 TABLET, COATED ORAL at 21:27

## 2019-08-27 RX ADMIN — PANTOPRAZOLE SODIUM 40 MG: 40 TABLET, DELAYED RELEASE ORAL at 06:34

## 2019-08-27 RX ADMIN — FLUOXETINE 10 MG: 10 CAPSULE ORAL at 08:39

## 2019-08-27 RX ADMIN — TIOTROPIUM BROMIDE 18 MCG: 18 CAPSULE ORAL; RESPIRATORY (INHALATION) at 08:41

## 2019-08-27 RX ADMIN — FLUTICASONE FUROATE AND VILANTEROL TRIFENATATE 1 PUFF: 200; 25 POWDER RESPIRATORY (INHALATION) at 08:41

## 2019-08-27 RX ADMIN — CLOZAPINE 50 MG: 25 TABLET ORAL at 08:39

## 2019-08-27 RX ADMIN — THEOPHYLLINE ANHYDROUS 200 MG: 200 CAPSULE, EXTENDED RELEASE ORAL at 08:39

## 2019-08-27 NOTE — PLAN OF CARE
Problem: Alteration in Thoughts and Perception  Goal: Agree to be compliant with medication regime, as prescribed and report medication side effects  Description  Interventions:  - Offer appropriate PRN medication and supervise ingestion; conduct aims, as needed   Outcome: Progressing     Problem: RESPIRATORY - ADULT  Goal: Achieves optimal ventilation and oxygenation  Description  INTERVENTIONS:  - Assess for changes in respiratory status  - Assess for changes in mentation and behavior  - Position to facilitate oxygenation and minimize respiratory effort  - Oxygen administration by appropriate delivery method based on oxygen saturation (per order) or ABGs  - Initiate smoking cessation education as indicated  - Encourage broncho-pulmonary hygiene including cough, deep breathe, Incentive Spirometry  - Assess the need for suctioning and aspirate as needed  - Assess and instruct to report SOB or any respiratory difficulty  - Respiratory Therapy support as indicated  Outcome: Progressing     Problem: Alteration in Thoughts and Perception  Goal: Attend and participate in unit activities, including therapeutic, recreational, and educational groups  Description  Interventions:  - Provide therapeutic and educational activities daily, encourage attendance and participation, and document same in the medical record   Outcome: Not Progressing  Goal: Complete daily ADLs, including personal hygiene independently, as able  Description  Interventions:  - Observe, teach, and assist patient with ADLS  - Monitor and promote a balance of rest/activity, with adequate nutrition and elimination   Outcome: Not Progressing     Problem: Ineffective Coping  Goal: Demonstrates healthy coping skills  Outcome: Not Progressing     Problem: Depression  Goal: Refrain from isolation  Description  Interventions:  - Develop a trusting relationship   - Encourage socialization   Outcome: Not Progressing     [address] continues to huddle under covers w/face to wall  Does come out of room for meal (ate only 25% as didn't like entree'), for scheduled medicine, for HS snack  Did not attend PM Group  Did not attend to any hygiene (hasn't combed out hair in 9 days), did not scrub her teeth, did not do any additional laps   Did use the incentive spirometer Q1H WA, 5-6 breaths, & achieved volumes of 750-900ml  She is pleasant, but, socially withdrawn  Is using the humidified nasal O2 @ 1L via cannula/compressor for HS

## 2019-08-27 NOTE — PLAN OF CARE
Problem: Alteration in Thoughts and Perception  Goal: Treatment Goal: Gain control of psychotic behaviors/thinking, reduce/eliminate presenting symptoms and demonstrate improved reality functioning upon discharge  Outcome: Progressing  Goal: Verbalize thoughts and feelings  Description  Interventions:  - Promote a nonjudgmental and trusting relationship with the patient through active listening and therapeutic communication  - Assess patient's level of functioning, behavior and potential for risk  - Engage patient in 1 on 1 interactions for a minimum of 15 minutes each session  - Encourage patient to express fears, feelings, frustrations, and discuss symptoms    - [place] patient to reality, help patient recognize reality-based thinking   - Administer medications as ordered and assess for potential side effects  - Provide the patient education related to the signs and symptoms of the illness and desired effects of prescribed medications  Outcome: Progressing     Problem: Ineffective Coping  Goal: Demonstrates healthy coping skills  Outcome: Progressing  Goal: Understands least restrictive measures  Description  Interventions:  - Utilize least restrictive behavior  Outcome: Progressing     Problem: Risk for Self Injury/Neglect  Goal: Refrain from harming self  Description  Interventions:  - Monitor patient closely, per order  - Develop a trusting relationship  - Supervise medication ingestion, monitor effects and side effects   Outcome: Progressing     Problem: Anxiety  Goal: Anxiety is at manageable level  Description  Interventions:  - Assess and monitor patient's anxiety level  - Monitor for signs and symptoms of anxiety both physical and emotional (heart palpitations, chest pain, shortness of breath, headaches, nausea, feeling jumpy, restlessness, irritable, apprehensive)  - Collaborate with interdisciplinary team and initiate plan and interventions as ordered    - [place] patient to unit/surroundings  - Explain treatment plan  - Encourage participation in care  - Encourage verbalization of concerns/fears  - Identify coping mechanisms  - Assist in developing anxiety-reducing skills  - Administer/offer alternative therapies  - Limit or eliminate stimulants  Outcome: Progressing     Problem: PAIN - ADULT  Goal: Verbalizes/displays adequate comfort level or baseline comfort level  Description  Interventions:  - Encourage patient to monitor pain and request assistance  - Assess pain using appropriate pain scale  - Administer analgesics based on type and severity of pain and evaluate response  - Implement non-pharmacological measures as appropriate and evaluate response  - Consider cultural and social influences on pain and pain management  - Notify physician/advanced practitioner if interventions unsuccessful or patient reports new pain  Outcome: Progressing     Problem: SAFETY ADULT  Goal: Patient will remain free of falls  Description  INTERVENTIONS:  - Assess patient frequently for physical needs  -  Identify cognitive and physical deficits and behaviors that affect risk of falls  -  [place] fall precautions as indicated by assessment   - Educate patient/family on patient safety including physical limitations  - Instruct patient to call for assistance with activity based on assessment  - Modify environment to reduce risk of injury  - Consider OT/PT consult to assist with strengthening/mobility  Outcome: Progressing   Patient has been in bed asleep, no concerns or apparent distress noted

## 2019-08-27 NOTE — PROGRESS NOTES
08/27/19 0800   Team Meeting   Meeting Type Daily Rounds   Team Members Present   Team Members Present Physician;Nurse; Other (Discipline and Name)   Physician Team Member Dr Teresa Akins, RN   Other (Discipline and Name) Irma Lazo [place]; Margaux Arboleda, SHANIKA     Attended [facility], but no other groups  Patient was in bed most of 3-11 shift

## 2019-08-27 NOTE — PROGRESS NOTES
Progress Note - Ama Turner 1955, 60 y o  female MRN: [id number]    [facility] Encounter: [id number]    Primary Care Provider: Maria Antonia Berger MD   Date and time admitted to hospital: 7/23/2019  5:30 PM        * Schizoaffective disorder, bipolar type [facility])  Assessment & Plan  Psychiatry Progress Note    Subjective: Interval History     Patient refused to get the PPD test done again claiming that she had it done in March and she is afraid that she is going to die if she  takes that test again again points towards her underlying paranoia  She claims that she is no longer spitting out any blood in her sputum  I did try to remind her that the QuantiFERON test was to rule out TB and since we cannot get in a blood sample that is why we ordered the PPD but she is absolutely refusing to get it done right now    She continues to be  preoccupied about her breathing ability even though she has been breathing fine with no visible breathing difficulties and has been using nasal oxygen at night    She continues to talk in a loud stilted, rambling  and monotonous manner as usual as if talking in a court of law but can be interrupted easily  She does not admit to any overt hallucinations or paranoia but still appears to harbor some covert  paranoia off and on based on her demeanor  She still does not always trust certain nursing staff giving her the medications as she continues to believe that  the inhaler atrovent has peanut oil in it and has been suspicious in her interactions with the staff  She hardly attends any groups even though she knows that she needs to attend some groups to be able to get privileges  She  still points to the lipoma on her and still believes  that it is a micro chip implanted on and this has not changed since she came to the unit  She has chosen not to to use the portable oxygen to get out of bed and go to groups if necessary    She is no longer talking about having special accommodations to take showers today     She is guarded suspicious evasive and in  denial of her illness as usual  No current suicidal homicidal thoughts intent or plans reported  No overt hallucinations or other delusions reported   No signs or sxs of agranulocytosis or myocarditis or endocarditis and she is moving her bowels so far with no issues   She did attend the team meeting with n problems and encouraged to drink enough fluids so they can collect enough specimen for the qantiferron test since she is refusing to get the PPD test done   Current medications:    Current Facility-Administered Medications:     acetaminophen (TYLENOL) tablet 650 mg, 650 mg, Oral, Q6H PRN, Sandhya Bolaños MD    albuterol (PROVENTIL HFA,VENTOLIN HFA) inhaler 2 puff, 2 puff, Inhalation, Q4H PRN, Sandhya Bolaños MD, 2 puff at 07/25/19 1200    benzonatate (TESSALON PERLES) capsule 100 mg, 100 mg, Oral, TID PRN, Sandhya Bolaños MD    cloZAPine (CLOZARIL) tablet 50 mg, 50 mg, Oral, TID, Sandhya Bolaños MD, 50 mg at 08/26/19 0848    FLUoxetine (PROzac) capsule 10 mg, 10 mg, Oral, Daily, Sandhya Bolaños MD, 10 mg at 08/26/19 0848    fluticasone-vilanterol (BREO ELLIPTA) 200-25 MCG/INH inhaler 1 puff, 1 puff, Inhalation, Daily, Sandhya Bolaños MD, 1 puff at 08/26/19 0849    levothyroxine tablet 125 mcg, 125 mcg, Oral, Early Morning, Sandhya Bolaños MD, 125 mcg at 08/26/19 0610    loratadine (CLARITIN) tablet 10 mg, 10 mg, Oral, Daily, Ashutosh Childs MD, 10 mg at 08/26/19 0848    montelukast (SINGULAIR) tablet 10 mg, 10 mg, Oral, HS, Ashutosh Childs MD, 10 mg at 08/25/19 2140    OLANZapine (ZyPREXA) tablet 5 mg, 5 mg, Oral, Q8H PRN, Sandhya Bolaños MD    pantoprazole (PROTONIX) EC tablet 40 mg, 40 mg, Oral, Early Morning, Sandhya Bolaños MD, 40 mg at 08/26/19 0610    theophylline (JEF-24) 24 hr capsule 200 mg, 200 mg, Oral, Daily, Sandhya Bolaños MD, 200 mg at 08/26/19 0848    tiotropium (SPIRIVA) capsule for inhaler 18 mcg, 18 mcg, Inhalation, Daily, Ashutosh Childs, MD, 18 mcg at 08/26/19 0850    traZODone (DESYREL) tablet 25 mg, 25 mg, Oral, HS PRN, Zac Sierra MD  Justification if on more than two antipsychotics:  Only on his clozapine  Side effects if any:  None    Risks , benefits, side effects and precautions of medications discussed with patient and reminded patient to let us know any problems with medications     Objective:     Vital Signs:  Vitals:    08/25/19 1630 08/25/19 1900 08/26/19 0700 08/26/19 0705   BP: 112/77 115/63 110/67 105/65   BP Location: Left arm Left arm Left arm Left arm   Pulse: 82 95 91 94   Resp: 16 17 17 17   Temp: 97 9 °F (36 6 °C) 98 3 °F (36 8 °C) 97 7 °F (36 5 °C)    TempSrc: Temporal Temporal Temporal    SpO2: 92% 92%     Weight:       Height:         Appearance:  age appropriate, casually dressed and overweight older than stated age   Behavior:  deviant, evasive and guarded and suspicious but with good eye contact   Speech:  normal pitch and normal volume but tends to become loud at times   Mood:  anxious and dysthymic   Affect:  mood-congruent   Thought Process:  goal directed and illogical slightly pressured but able to be redirected and talks as if in a court of low being stilted   Thought Content:  delusions  grandiose and somatic about not being able to breathe despite being on nasal oxygen and paranoid about people out to harm her and Atrovent inhaler tainteded with peanut oil  No current suicidal homicidal thoughts intent or plans reported    No phobias obsessions or compulsions reported   Perceptual Disturbances: None and does not appear responding to internal stimuli   Risk Potential: Tendency to not care for herself if she goes off treatment   Sensorium:  person, place, time/date, situation, day of week, month of year and time   Cognition:  grossly intact   Consciousness:  alert and awake    Attention: Intact concentration and attention span   Intellect: Considered to be at least of average intelligence   Insight:  limited in denial   Judgment: poor      Motor Activity: no abnormal movements         Recent Labs:  Results Reviewed     None          I/O Past 24 hours:  No intake/output data recorded  No intake/output data recorded  Assessment / Plan:     Schizoaffective disorder, bipolar type ([facility] [address] )      Reason for continued inpatient care:  Because of underlying paranoia and refusal to go back to the personal care home and inability to care for herself on her own  Acceptance by patient:  Accepting  Colette Castrejon in recovery:  About living in another personal care home once she feels better  Understanding of medications :  Has some understanding  Involved in reintegration process:  Adjusting to the unit  Trusting in relatoinship with psychiatrist:  Trusting    Recommended Treatment:    Medication changes:  1) none today  Non-pharmacological treatments  1) start individual therapy group therapy, milieu therapy and occupational therapy and milieu therapy involving multidisciplinary team approach with psychotherapist, case management, nursing, peer support services, art therapist etc using recovery principles and psycho-education about accepting illness and need for treatment   2) encouraged to cooperate with the QuantiFERON test or PPD  Safety  1) Safety/communication plan established targeting dynamic risk factors above  Discharge Plan most likely back to the a:  Personal care boarding home with act services once her delusions are managed and mood becomes more stabilized and she becomes more receptive to treatment compliance    Counseling / Coordination of Care    Total floor / unit time spent today 15 minutes  Greater than 50% of total time was spent with the patient and / or family counseling and / or coordination of care  A description of the counseling / coordination of care       Patient's Rights, confidentiality and exceptions to confidentiality, use of automated medical record, [facility] T[facility] Patrick staff access to medical record, and consent to treatment reviewed      Teri Huggins MD

## 2019-08-27 NOTE — PROGRESS NOTES
Pt isolative to self and room throughout majority of shift, did attend IMR this AM and observed conversing with roommate  Required prompting for meds this AM, meal compliant with fair appetite  Pt complaining that her O2 concentrator was making an awkward noise this AM, this author turned on concentrator and listened but no odd noises were heard, will report to respiratory if complaint continues  Denies any depression or anxiety but continues with somatic c/o inability to breathe but no SOB reported or observed at this time  Maintain safe and fall precautions as ordered

## 2019-08-28 PROCEDURE — 99231 SBSQ HOSP IP/OBS SF/LOW 25: CPT | Performed by: PSYCHIATRY & NEUROLOGY

## 2019-08-28 RX ADMIN — CLOZAPINE 50 MG: 25 TABLET ORAL at 21:36

## 2019-08-28 RX ADMIN — CLOZAPINE 50 MG: 25 TABLET ORAL at 08:40

## 2019-08-28 RX ADMIN — CLOZAPINE 50 MG: 25 TABLET ORAL at 17:46

## 2019-08-28 RX ADMIN — FLUOXETINE 10 MG: 10 CAPSULE ORAL at 08:40

## 2019-08-28 RX ADMIN — THEOPHYLLINE ANHYDROUS 200 MG: 200 CAPSULE, EXTENDED RELEASE ORAL at 08:40

## 2019-08-28 RX ADMIN — MONTELUKAST SODIUM 10 MG: 10 TABLET, COATED ORAL at 21:36

## 2019-08-28 RX ADMIN — FLUTICASONE FUROATE AND VILANTEROL TRIFENATATE 1 PUFF: 200; 25 POWDER RESPIRATORY (INHALATION) at 08:42

## 2019-08-28 RX ADMIN — LEVOTHYROXINE SODIUM 125 MCG: 125 TABLET ORAL at 06:22

## 2019-08-28 RX ADMIN — PANTOPRAZOLE SODIUM 40 MG: 40 TABLET, DELAYED RELEASE ORAL at 06:22

## 2019-08-28 RX ADMIN — TIOTROPIUM BROMIDE 18 MCG: 18 CAPSULE ORAL; RESPIRATORY (INHALATION) at 08:42

## 2019-08-28 NOTE — PROGRESS NOTES
Pt attended IMR and was able to complete [employer] goal sheet  Patient was focused, attentive and cooperative throughout group  Patient was also able to verbally speak about her goals such as volunteering at [employer] and learning how to help others with mental illness

## 2019-08-28 NOTE — ASSESSMENT & PLAN NOTE
Psychiatry Progress Note    Subjective: Interval History     Patient is still refusing to have the PPD test done claiming that she had 1 done earlier this month and she still believes that they are trying to kill her by giving her that test again and is not amenable to any verbal reasoning with her  She continues to be  preoccupied about her breathing ability even though she has been breathing fine with no visible breathing difficulties and has been using nasal oxygen at night  She is still not attending all the groups and prefers to stay back on her bed even though she tells me that she will try to attend more groups again  She continues to talk in a loud stilted, rambling  and monotonous manner as usual as if talking in a court of law but can be interrupted easily  She does not admit to any overt hallucinations or paranoia but still appears to harbor some covert  paranoia off and on based on her demeanor  She still does not always trust certain nursing staff giving her the medications and is sometimes argumentative with staff  She hardly attends any groups even though she knows that she needs to attend some groups to be able to get privileges  She  still points to the lipoma on her and still believes  that it is a micro chip implanted on and this has not changed since she came to the unit  She has chosen not to to use the portable oxygen to get out of bed and go to groups if necessary  She remained guarded suspicious evasive and in  denial of her illness as usual  No current suicidal homicidal thoughts intent or plans reported  No overt hallucinations or other delusions reported   No signs or sxs of agranulocytosis or myocarditis or endocarditis and she is moving her bowels so far with no issues    She was reminded to cooperate with the blood test as ordered by medical for the QuantiFERON test  Current medications:    Current Facility-Administered Medications:     acetaminophen (TYLENOL) tablet 650 mg, 650 mg, Oral, Q6H PRN, Mynor Terry MD    albuterol (PROVENTIL HFA,VENTOLIN HFA) inhaler 2 puff, 2 puff, Inhalation, Q4H PRN, Mynor Terry MD, 2 puff at 07/25/19 1200    aluminum-magnesium hydroxide-simethicone (MYLANTA) 200-200-20 mg/5 mL oral suspension 15 mL, 15 mL, Oral, Q4H PRN, Mynor Terry MD    ammonium lactate (LAC-HYDRIN) 12 % lotion 1 application, 1 application, Topical, BID PRN, Mynor Terry MD    benzonatate (TESSALON PERLES) capsule 100 mg, 100 mg, Oral, TID PRN, Mynor Terry MD    benztropine (COGENTIN) injection 1 mg, 1 mg, Intramuscular, Q8H PRN, Mynor Terry MD    cloZAPine (CLOZARIL) tablet 50 mg, 50 mg, Oral, TID, Mynor Terry MD, 50 mg at 08/28/19 0840    EPINEPHrine PF (ADRENALIN) 1 mg/mL injection 0 15 mg, 0 15 mg, Intramuscular, Once PRN, Mynor Terry MD    FLUoxetine (PROzac) capsule 10 mg, 10 mg, Oral, Daily, Mynor Terry MD, 10 mg at 08/28/19 0840    fluticasone-vilanterol (BREO ELLIPTA) 200-25 MCG/INH inhaler 1 puff, 1 puff, Inhalation, Daily, Mynor Terry MD, 1 puff at 08/28/19 0842    ketotifen (ZADITOR) 0 025 % ophthalmic solution 1 drop, 1 drop, Right Eye, BID PRN, Mynor Terry MD    levothyroxine tablet 125 mcg, 125 mcg, Oral, Early Morning, Mynor Terry MD, 125 mcg at 08/28/19 0622    magnesium hydroxide (MILK OF MAGNESIA) 400 mg/5 mL oral suspension 30 mL, 30 mL, Oral, Daily PRN, Mynor Terry MD    montelukast (SINGULAIR) tablet 10 mg, 10 mg, Oral, HS, Mynor Terry MD, 10 mg at 08/27/19 2127    OLANZapine (ZyPREXA) IM injection 5 mg, 5 mg, Intramuscular, Q8H PRN, Mynor Terry MD    OLANZapine (ZyPREXA) tablet 5 mg, 5 mg, Oral, Q8H PRN, Mynor Terry MD    pantoprazole (PROTONIX) EC tablet 40 mg, 40 mg, Oral, Early Morning, Mynor Terry MD, 40 mg at 08/28/19 0622    polyethylene glycol (MIRALAX) packet 17 g, 17 g, Oral, Daily PRN, Mynor Terry MD    polyvinyl alcohol (LIQUIFILM TEARS) 1 4 % ophthalmic solution 1 drop, 1 drop, Both Eyes, Q3H PRN, Mynor Terry MD  [facility] theophylline (JEF-24) 24 hr capsule 200 mg, 200 mg, Oral, Daily, Tree Madden MD, 200 mg at 08/28/19 0840    tiotropium [facility]) capsule for inhaler 18 mcg, 18 mcg, Inhalation, Daily, Tree Madden MD, 18 mcg at 08/28/19 5871    traZODone (DESYREL) tablet 25 mg, 25 mg, Oral, HS PRN, Tree Madden MD    tuberculin injection 5 Units, 5 Units, Intradermal, Once, Tree Madden MD, Stopped at 08/26/19 1326  Justification if on more than two antipsychotics:  Only on his clozapine  Side effects if any:  None    Risks , benefits, side effects and precautions of medications discussed with patient and reminded patient to let us know any problems with medications     Objective:     Vital Signs:  Vitals:    08/27/19 1620 08/27/19 1933 08/27/19 2130 08/28/19 0730   BP: 117/67 97/63  120/78   BP Location: Left arm Left arm  Right arm   Pulse: 95 101  94   Resp: 18 16  18   Temp: 98 7 °F (37 1 °C) (!) 97 3 °F (36 3 °C)  97 7 °F (36 5 °C)   TempSrc: Temporal Temporal     SpO2: 90% 92% 94%    Weight:       Height:         Appearance:  age appropriate, casually dressed and overweight older than stated age   Behavior:  deviant, evasive and guarded and suspicious but with good eye contact   Speech:  normal pitch and normal volume but tends to become loud at times   Mood:  anxious and dysthymic   Affect:  mood-congruent   Thought Process:  goal directed and illogical slightly pressured but able to be redirected and talks as if in a court of low being stilted   Thought Content:  delusions  grandiose and somatic about not being able to breathe despite being on nasal oxygen and paranoid about people out to harm her and Atrovent inhaler tainteded with peanut oil  No current suicidal homicidal thoughts intent or plans reported    No phobias obsessions or compulsions reported   Perceptual Disturbances: None and does not appear responding to internal stimuli   Risk Potential: Tendency to not care for herself if she goes off treatment Sensorium:  person, place, time/date, situation, day of week, month of year and time   Cognition:  grossly intact   Consciousness:  alert and awake    Attention: Intact concentration and attention span   Intellect: Considered to be at least of average intelligence   Insight:  limited in denial   Judgment: poor      Motor Activity: no abnormal movements         Recent Labs:  Results Reviewed     None          I/O Past 24 hours:  No intake/output data recorded  No intake/output data recorded  Assessment / Plan:     [facility]      Reason for continued inpatient care:  Because of underlying paranoia and refusal to go back to the personal care home and inability to care for herself on her own  Acceptance by patient:  Accepting  Regan Keita in recovery:  About living in another personal care home once she feels better  Understanding of medications :  Has some understanding  Involved in reintegration process:  Adjusting to the unit  Trusting in relatoinship with psychiatrist:  Trusting    Recommended Treatment:    Medication changes:  1) none today  Non-pharmacological treatments  1) start individual therapy group therapy, milieu therapy and occupational therapy and milieu therapy involving multidisciplinary team approach with psychotherapist, case management, nursing, peer support services, art therapist etc using recovery principles and psycho-education about accepting illness and need for treatment   3) check laps and encourage p o  Fluids because of low-grade fever  Safety  1) Safety/communication plan established targeting dynamic risk factors above  Discharge Plan most likely back to the a:  Personal care boarding home with act services once her delusions are managed and mood becomes more stabilized and she becomes more receptive to treatment compliance    Counseling / Coordination of Care    Total floor / unit time spent today 15 minutes   Greater than 50% of total time was spent with the patient and / or family counseling and / or coordination of care  A description of the counseling / coordination of care  Patient's Rights, confidentiality and exceptions to confidentiality, use of automated medical record, [facility] Patrick staff access to medical record, and consent to treatment reviewed      Alirio Frazier MD

## 2019-08-28 NOTE — PLAN OF CARE
Problem: Alteration in Thoughts and Perception  Goal: Agree to be compliant with medication regime, as prescribed and report medication side effects  Description  Interventions:  - Offer appropriate PRN medication and supervise ingestion; conduct aims, as needed   Outcome: Progressing     Problem: PAIN - ADULT  Goal: Verbalizes/displays adequate comfort level or baseline comfort level  Description  Interventions:  - Encourage patient to monitor pain and request assistance  - Assess pain using appropriate pain scale  - Administer analgesics based on type and severity of pain and evaluate response  - Implement non-pharmacological measures as appropriate and evaluate response  - Consider cultural and social influences on pain and pain management  - Notify physician/advanced practitioner if interventions unsuccessful or patient reports new pain  Outcome: Progressing     Problem: SAFETY ADULT  Goal: Patient will remain free of falls  Description  INTERVENTIONS:  - Assess patient frequently for physical needs  -  Identify cognitive and physical deficits and behaviors that affect risk of falls    -  [employer] fall precautions as indicated by assessment   - Educate patient/family on patient safety including physical limitations  - Instruct patient to call for assistance with activity based on assessment  - Modify environment to reduce risk of injury  - Consider OT/PT consult to assist with strengthening/mobility  Outcome: Progressing     Problem: RESPIRATORY - ADULT  Goal: Achieves optimal ventilation and oxygenation  Description  INTERVENTIONS:  - Assess for changes in respiratory status  - Assess for changes in mentation and behavior  - Position to facilitate oxygenation and minimize respiratory effort  - Oxygen administration by appropriate delivery method based on oxygen saturation (per order) or ABGs  - Initiate smoking cessation education as indicated  - Encourage broncho-pulmonary hygiene including cough, deep breathe, Incentive Spirometry  - Assess the need for suctioning and aspirate as needed  - Assess and instruct to report SOB or any respiratory difficulty  - Respiratory Therapy support as indicated  Outcome: Progressing   The patient slept through the night with no behaviors or issues noted  Oxygen maintained at 1 liter via nasal cannula  No s/s respiratory distress noted  Fall precautions maintained  Med compliant  Continue to monitor

## 2019-08-28 NOTE — PLAN OF CARE
Problem: Alteration in Thoughts and Perception  Goal: Agree to be compliant with medication regime, as prescribed and report medication side effects  Description  Interventions:  - Offer appropriate PRN medication and supervise ingestion; conduct aims, as needed   Outcome: Progressing     Problem: RESPIRATORY - ADULT  Goal: Achieves optimal ventilation and oxygenation  Description  INTERVENTIONS:  - Assess for changes in respiratory status  - Assess for changes in mentation and behavior  - Position to facilitate oxygenation and minimize respiratory effort  - Oxygen administration by appropriate delivery method based on oxygen saturation (per order) or ABGs  - Initiate smoking cessation education as indicated  - Encourage broncho-pulmonary hygiene including cough, deep breathe, Incentive Spirometry  - Assess the need for suctioning and aspirate as needed  - Assess and instruct to report SOB or any respiratory difficulty  - Respiratory Therapy support as indicated  Outcome: Progressing     Problem: Alteration in Thoughts and Perception  Goal: Attend and participate in unit activities, including therapeutic, recreational, and educational groups  Description  Interventions:  - Provide therapeutic and educational activities daily, encourage attendance and participation, and document same in the medical record   Outcome: Not Progressing  Goal: Complete daily ADLs, including personal hygiene independently, as able  Description  Interventions:  - Observe, teach, and assist patient with ADLS  - Monitor and promote a balance of rest/activity, with adequate nutrition and elimination   Outcome: Not Progressing     Problem: Ineffective Coping  Goal: Demonstrates healthy coping skills  Outcome: Not Progressing     Problem: Depression  Goal: Refrain from isolation  Description  Interventions:  - Develop a trusting relationship   - Encourage socialization   Outcome: Not Progressing     [facility] is showing improvement in quality of respirations & volumes achieved on the [facility] [employer]  Is consistently getting 1000ml doing 5-6 breaths per hour  But, no move to address hygiene; hair not washed or combed since 8/17, not scrubbing teeth  Not doing any extra ambulation beyond what is necessary to come for meal (ate 50%), for scheduled medicine, for HS snack  Spends her free time crumpled in bed  Was visited by sister w/warm interactions between them  Did not attend PM Group   Is now wearing the humidified nasal O2 @ 1L for HS

## 2019-08-28 NOTE — PROGRESS NOTES
08/28/19 0900   Team Meeting   Meeting Type Daily Rounds   Team Members Present   Team Members Present Physician;Nurse;; Other (Discipline and Name)   Physician Team Member Dr Ileana Bro Team Member Marcus Prince RN   Care Management Team Member Brenda Lao   Other (Discipline and Name) DIANA Pierce     Attended [facility]  Isolative to her room  Somatic  Uncooperative with medical follow up regarding her somatic complaints  Not keeping up hygiene  Sister visited  Slept

## 2019-08-28 NOTE — TREATMENT PLAN
Treatment plan review conducted with patient  Patient was cooperative with this review and more accepting of obstacles and interventions than she was at initial review  Updates are as follows:    Medication adherance:  Patient takes 100% of medications  Medication management:  Patient knows her medications  Oxygen:  Patient feels comfortable with her oxygen, but still has some fears about going off-unit  She agrees that she is able to better differentiate anxiety-based fear vs reality-based fear  Tobacco use:  Patient has not smoked since she has been on EAC  ADLs/Hygiene:  Patient still feels as though her shower is too small to fit a shower chair  She verbalizes a need to adapt to the claustrophobia she feels in the shower and was acknowledged for her attempt to accept the situation  Somatic complaints:  Still present, but more redirectable than at the time of initial review  Irritability/Agitation:  Patient has not demonstrated irritability or agitation in the last 30 days  Multiple hospitalizations:  Patient's insight towards her multiple hospitalizations is still progressing  Depression/anxiety:  Patient rates depression 5/10 and anxiety 5/10  She states that her biggest worry is having a place to live when she is discharged  She was assured that case management will refer her to an appropriate discharge setting  Impulse control/Renae:  Patient acknowledges that she has experienced symptoms of renae in the past, but not while on EAC  Community integration:  Patient still needs increased group participation before she can go off-unit  Community activity and supports:  Patient's siblings visit and patient reports that her brother is willing to take her on a pass when she has the privilege  Discharge planning:   awaiting return call from Jaz West at [facility] to determine the status of patient's bill and re-acceptance upon discharge       Patient and team signed in agreement

## 2019-08-28 NOTE — PROGRESS NOTES
Progress Note - Nam Camacho 1955, 60 y o  female MRN: [id number]    Unit/Bed#: [place] [id number] Encounter: [id number]    Primary Care Provider: Glory Rojas MD   Date and time admitted to hospital: 7/23/2019  5:30 PM        * Schizoaffective disorder, bipolar type [facility])  Assessment & Plan  Psychiatry Progress Note    Subjective: Interval History     Patient is still refusing to have the PPD test done claiming that she had 1 done earlier this month and she still believes that they are trying to kill her by giving her that test again and is not amenable to any verbal reasoning with her  She continues to be  preoccupied about her breathing ability even though she has been breathing fine with no visible breathing difficulties and has been using nasal oxygen at night  She is still not attending all the groups and prefers to stay back on her bed even though she tells me that she will try to attend more groups again  She continues to talk in a loud stilted, rambling  and monotonous manner as usual as if talking in a court of law but can be interrupted easily  She does not admit to any overt hallucinations or paranoia but still appears to harbor some covert  paranoia off and on based on her demeanor  She still does not always trust certain nursing staff giving her the medications and is sometimes argumentative with staff  She hardly attends any groups even though she knows that she needs to attend some groups to be able to get privileges  She  still points to the lipoma on her and still believes  that it is a micro chip implanted on and this has not changed since she came to the unit  She has chosen not to to use the portable oxygen to get out of bed and go to groups if necessary  She remained guarded suspicious evasive and in  denial of her illness as usual  No current suicidal homicidal thoughts intent or plans reported  No overt hallucinations or other delusions reported    No signs or sxs of agranulocytosis or myocarditis or endocarditis and she is moving her bowels so far with no issues    She was reminded to cooperate with the blood test as ordered by medical for the QuantiFERON test  Current medications:    Current Facility-Administered Medications:     acetaminophen (TYLENOL) tablet 650 mg, 650 mg, Oral, Q6H PRN, Sindy Day MD    albuterol (PROVENTIL HFA,VENTOLIN HFA) inhaler 2 puff, 2 puff, Inhalation, Q4H PRN, Sindy Day MD, 2 puff at 07/25/19 1200    aluminum-magnesium hydroxide-simethicone (MYLANTA) 200-200-20 mg/5 mL oral suspension 15 mL, 15 mL, Oral, Q4H PRN, Sindy Day MD    ammonium lactate (LAC-HYDRIN) 12 % lotion 1 application, 1 application, Topical, BID PRN, Sindy Day MD    benzonatate (TESSALON PERLES) capsule 100 mg, 100 mg, Oral, TID PRN, Sindy Day MD    benztropine (COGENTIN) injection 1 mg, 1 mg, Intramuscular, Q8H PRN, Sindy Day MD    cloZAPine (CLOZARIL) tablet 50 mg, 50 mg, Oral, TID, Sindy Day MD, 50 mg at 08/28/19 0840    EPINEPHrine PF (ADRENALIN) 1 mg/mL injection 0 15 mg, 0 15 mg, Intramuscular, Once PRN, Sindy Day MD    FLUoxetine (PROzac) capsule 10 mg, 10 mg, Oral, Daily, Sindy Dya MD, 10 mg at 08/28/19 0840    fluticasone-vilanterol (BREO ELLIPTA) 200-25 MCG/INH inhaler 1 puff, 1 puff, Inhalation, Daily, Sindy Day MD, 1 puff at 08/28/19 0842    ketotifen (ZADITOR) 0 025 % ophthalmic solution 1 drop, 1 drop, Right Eye, BID PRN, Snidy Day MD    levothyroxine tablet 125 mcg, 125 mcg, Oral, Early Morning, Sindy Day MD, 125 mcg at 08/28/19 0622    magnesium hydroxide (MILK OF MAGNESIA) 400 mg/5 mL oral suspension 30 mL, 30 mL, Oral, Daily PRN, Sindy Day MD    montelukast (SINGULAIR) tablet 10 mg, 10 mg, Oral, HS, Sindy Day MD, 10 mg at 08/27/19 2127    OLANZapine (ZyPREXA) IM injection 5 mg, 5 mg, Intramuscular, Q8H PRN, Sindy Day MD    OLANZapine (ZyPREXA) tablet 5 mg, 5 mg, Oral, Q8H PRN, MD Rosie Fisher pantoprazole (PROTONIX) EC tablet 40 mg, 40 mg, Oral, Early Morning, Jeet Levine MD, 40 mg at 08/28/19 0622    polyethylene glycol (MIRALAX) packet 17 g, 17 g, Oral, Daily PRN, Jeet Levine MD    polyvinyl alcohol (LIQUIFILM TEARS) 1 4 % ophthalmic solution 1 drop, 1 drop, Both Eyes, Q3H PRN, Jeet Levine MD    theophylline (JEF-24) 24 hr capsule 200 mg, 200 mg, Oral, Daily, Jeet Levine MD, 200 mg at 08/28/19 0840    tiotropium [facility]) capsule for inhaler 18 mcg, 18 mcg, Inhalation, Daily, Jeet Levine MD, 18 mcg at 08/28/19 3816    traZODone (DESYREL) tablet 25 mg, 25 mg, Oral, HS PRN, Jeet Levine MD    tuberculin injection 5 Units, 5 Units, Intradermal, Once, Jeet Levine MD, Stopped at 08/26/19 1326  Justification if on more than two antipsychotics:  Only on his clozapine  Side effects if any:  None    Risks , benefits, side effects and precautions of medications discussed with patient and reminded patient to let us know any problems with medications     Objective:     Vital Signs:  Vitals:    08/27/19 1620 08/27/19 1933 08/27/19 2130 08/28/19 0730   BP: 117/67 97/63  120/78   BP Location: Left arm Left arm  Right arm   Pulse: 95 101  94   Resp: 18 16  18   Temp: 98 7 °F (37 1 °C) (!) 97 3 °F (36 3 °C)  97 7 °F (36 5 °C)   TempSrc: Temporal Temporal     SpO2: 90% 92% 94%    Weight:       Height:         Appearance:  age appropriate, casually dressed and overweight older than stated age   Behavior:  deviant, evasive and guarded and suspicious but with good eye contact   Speech:  normal pitch and normal volume but tends to become loud at times   Mood:  anxious and dysthymic   Affect:  mood-congruent   Thought Process:  goal directed and illogical slightly pressured but able to be redirected and talks as if in a court of low being stilted   Thought Content:  delusions  grandiose and somatic about not being able to breathe despite being on nasal oxygen and paranoid about people out to harm her and Atrovent inhaler tainteded with peanut oil  No current suicidal homicidal thoughts intent or plans reported  No phobias obsessions or compulsions reported   Perceptual Disturbances: None and does not appear responding to internal stimuli   Risk Potential: Tendency to not care for herself if she goes off treatment   Sensorium:  person, place, time/date, situation, day of week, month of year and time   Cognition:  grossly intact   Consciousness:  alert and awake    Attention: Intact concentration and attention span   Intellect: Considered to be at least of average intelligence   Insight:  limited in denial   Judgment: poor      Motor Activity: no abnormal movements         Recent Labs:  Results Reviewed     None          I/O Past 24 hours:  No intake/output data recorded  No intake/output data recorded  Assessment / Plan:     [facility]ffect[facility] )      Reason for continued inpatient care:  Because of underlying paranoia and refusal to go back to the personal care home and inability to care for herself on her own  Acceptance by patient:  Accepting  Jenn Calhoun in recovery:  About living in another personal care home once she feels better  Understanding of medications :  Has some understanding  Involved in reintegration process:  Adjusting to the unit  Trusting in relatoinship with psychiatrist:  Trusting    Recommended Treatment:    Medication changes:  1) none today  Non-pharmacological treatments  1) start individual therapy group therapy, milieu therapy and occupational therapy and milieu therapy involving multidisciplinary team approach with psychotherapist, case management, nursing, peer support services, art therapist etc using recovery principles and psycho-education about accepting illness and need for treatment   3) check laps and encourage p o  Fluids because of low-grade fever  Safety  1) Safety/communication plan established targeting dynamic risk factors above    Discharge Plan most likely back to the a:  Personal care boarding home with act services once her delusions are managed and mood becomes more stabilized and she becomes more receptive to treatment compliance    Counseling / Coordination of Care    Total floor / unit time spent today 15 minutes  Greater than 50% of total time was spent with the patient and / or family counseling and / or coordination of care  A description of the counseling / coordination of care  Patient's Rights, confidentiality and exceptions to confidentiality, use of automated medical record, [facility] staff access to medical record, and consent to treatment reviewed      Oval MD Joseluis

## 2019-08-28 NOTE — PROGRESS NOTES
Patient attended IMR and was able to focus on creating goals for herself in life Domain categories  Pt also attached steps to reach her goals in areas of housing, volunteering and her own personal recovery  Pt was talkative, cooperative and pleasant

## 2019-08-29 PROCEDURE — 97167 OT EVAL HIGH COMPLEX 60 MIN: CPT

## 2019-08-29 PROCEDURE — 99231 SBSQ HOSP IP/OBS SF/LOW 25: CPT | Performed by: PSYCHIATRY & NEUROLOGY

## 2019-08-29 RX ADMIN — PANTOPRAZOLE SODIUM 40 MG: 40 TABLET, DELAYED RELEASE ORAL at 06:36

## 2019-08-29 RX ADMIN — FLUOXETINE 10 MG: 10 CAPSULE ORAL at 08:56

## 2019-08-29 RX ADMIN — CLOZAPINE 50 MG: 25 TABLET ORAL at 21:23

## 2019-08-29 RX ADMIN — MONTELUKAST SODIUM 10 MG: 10 TABLET, COATED ORAL at 21:23

## 2019-08-29 RX ADMIN — TIOTROPIUM BROMIDE 18 MCG: 18 CAPSULE ORAL; RESPIRATORY (INHALATION) at 08:56

## 2019-08-29 RX ADMIN — CLOZAPINE 50 MG: 25 TABLET ORAL at 08:56

## 2019-08-29 RX ADMIN — LEVOTHYROXINE SODIUM 125 MCG: 125 TABLET ORAL at 06:36

## 2019-08-29 RX ADMIN — THEOPHYLLINE ANHYDROUS 200 MG: 200 CAPSULE, EXTENDED RELEASE ORAL at 08:55

## 2019-08-29 RX ADMIN — FLUTICASONE FUROATE AND VILANTEROL TRIFENATATE 1 PUFF: 200; 25 POWDER RESPIRATORY (INHALATION) at 08:56

## 2019-08-29 RX ADMIN — CLOZAPINE 50 MG: 25 TABLET ORAL at 17:40

## 2019-08-29 NOTE — PLAN OF CARE
Problem: Alteration in Thoughts and Perception  Goal: Verbalize thoughts and feelings  Description  Interventions:  - Promote a nonjudgmental and trusting relationship with the patient through active listening and therapeutic communication  - Assess patient's level of functioning, behavior and potential for risk  - Engage patient in 1 on 1 interactions for a minimum of 15 minutes each session  - Encourage patient to express fears, feelings, frustrations, and discuss symptoms    - [place] patient to reality, help patient recognize reality-based thinking   - Administer medications as ordered and assess for potential side effects  - Provide the patient education related to the signs and symptoms of the illness and desired effects of prescribed medications  Outcome: Progressing     Problem: Ineffective Coping  Goal: Demonstrates healthy coping skills  Outcome: Progressing     Problem: Risk for Self Injury/Neglect  Goal: Verbalize thoughts and feelings  Description  Interventions:  - Assess and re-assess patient's lethality and potential for self-injury  - Engage patient in 1:1 interactions, daily, for a minimum of 15 minutes  - Encourage patient to express feelings, fears, frustrations, hopes  - Establish rapport/trust with patient   Outcome: Progressing  Goal: Refrain from harming self  Description  Interventions:  - Monitor patient closely, per order  - Develop a trusting relationship  - Supervise medication ingestion, monitor effects and side effects   Outcome: Progressing     Problem: Depression  Goal: Verbalize thoughts and feelings  Description  Interventions:  - Assess and re-assess patient's level of risk   - Engage patient in 1:1 interactions, daily, for a minimum of 15 minutes   - Encourage patient to express feelings, fears, frustrations, hopes   Outcome: Progressing     Problem: SAFETY ADULT  Goal: Patient will remain free of falls  Description  INTERVENTIONS:  - Assess patient frequently for physical needs  -  Identify cognitive and physical deficits and behaviors that affect risk of falls  -  [place] fall precautions as indicated by assessment   - Educate patient/family on patient safety including physical limitations  - Instruct patient to call for assistance with activity based on assessment  - Modify environment to reduce risk of injury  - Consider OT/PT consult to assist with strengthening/mobility  Outcome: Progressing    Pt isolative to room throughout shift, did come out for meals, required prompting with medications  Did not attend any groups this shift and no interaction with peers observed  Compliant with incentive spirometry q4 hours this shift  C/o increase in anxiety this AM and she wasn't sure why because nothing triggered her but not requesting any PRNs, wanted to rest and try to cope by herself  No further complaints throughout shift but did not get OOB except for meals   Blood work scheduled for AM

## 2019-08-29 NOTE — OCCUPATIONAL THERAPY NOTE
Occupational Therapy Evaluation      Selina Simon    8/29/2019    Patient Active Problem List   Diagnosis    Sepsis [facility]    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[facility] )    Tobacco use disorder, continuous    Bipolar disorder ([address] )    Left hip pain    Lactic acidosis    Hypokalemia    Hypomagnesemia    Compression fracture of L4 lumbar vertebra    Thoracic compression fracture (HCC)    Ventral hernia    Parapneumonic effusion    Acute on chronic respiratory failure with hypoxia (HCC)    Chronic respiratory failure (HCC)    Hypophosphatemia    Elevated MCV    Schizoaffective disorder, bipolar type ([facility] )    Acquired hypothyroidism    Gastroesophageal reflux disease without esophagitis    Abnormal CT of the chest    Excessive cerumen in left ear canal    Lipoma of right upper extremity    Polydipsia    Localized swelling of both lower legs    Noncompliant with deep vein thrombosis (DVT) prophylaxis    Allergic conjunctivitis of right eye       Past Medical History:   Diagnosis Date    Acid reflux     Anxiety     Asthma     Bipolar 1 disorder (HCC)     Chronic pain disorder     Chronic respiratory failure (HCC)     Compression fracture of fourth lumbar vertebra (HCC)     COPD (chronic obstructive pulmonary disease) (HCC)     Depression     GERD (gastroesophageal reflux disease)     History of home oxygen therapy     Hypothyroidism     Lipoma of upper extremity     Psychiatric illness     [facility]ffective disorder [facility]     Substance abuse ([facility] )     Nicotine    Thoracic compression fracture ([facility] )     Ventral hernia        No past surgical history on file  Subjective: RE: reason for hospitalization: "More time to get myself together and find a place to live "    Ned Emmanuel is known to this writer from previous OT involvement  She has been on the OA unit from 5/5/19-07/23/19 prior to coming to [facility]  She had come to OA as a transfer from medical floor where she was being treated for pneumonia   Prior to that, she had been at a personal care home ([place]), was noted to have been refusing all mediations and accused staff of poisoning her medications with peanut oil and claiming she could not breath  She continued to require extended treatment and was admitted to [facility] on 7/23/19  Prior Level of Function:  Jenn Acosta is ambulatory  Prior to hospitalization, she had been residing at [facility]  Now she stated that she is receptive to returning there (while on OA unit, she was resistive to this idea)  She stated that while there, she did need occasional assistance for personal care  She does feed herself  She reports that she has managed her own money  Her meals have been provided along with housekeeping needs and laundry  She has been dependent on transportation needs  Jeantheresa Friend was provided with assistance with medications from personal care home staff (she stated, "I'm very good with my medicine, but in recent past on OA unit she did make frequent complaints with respect to her medications)  She had been using O2 while at the personal care home  She had at times presented with somatic concerns, expressed many concerns with her breathing status when on OA unit  Falls:  She denies any falls in the past 6 months  Vision:  She was not wearing glasses at time of interview  She stated that she does need to get her eyes checked  Alert & Oriented   She is alert, oriented to person, place, time; her orientation to situation is very generalized at best     Hobbies/Interests:  She stated that she has liked doing word searches, coloring mandalas, reading (as she is able since her vision is challenged)  Support:   She stated that her supports include her 2 brothers, her sister  She stated that she still has ACT involvement  Pain:  She denies any pain at this time  R UE AROM WFL's  RUE  Strength good grasp  LUE AROM WFL's  LUE Strength good grasp      Current Level of Function:  Jenn Acosta is independent in ambulation   She was not wearing O2 at this time, she does wear this at night  She stated that she does sometimes need assistance for personal care, especially showing  She stated that it is hard for her to shower on the unit, that she has to use a commode seat to sit on when showering if she wants to sit down  Her affect was generally neutral to positive during her interactions with this writer  She did present with a neat appearance  She does feed herself on the living unit  She is noted to be independent in hygiene needs per daily cares documentation  Work Task Skills:  Task Investment assistance needed to initiate/attend/complete task she had challenge with 1-2 step whip stitch task, she did attribute this in part due to visual challenges  Problem Solving Able to recognize errors on a novel task, but requires assistance for correction for 1-2 step whip stitch task  Concentration No difficulty she was attentive to interview process  Follows Direction she was able to carry out multistep written instruction, 1 step demonstrative/ verbal instruction  Frustration Tolerance her frustration tolerance for the most part was at least fair  She did appear annoyed with task challenges on whip stitch task although she did appear to initially take effort to carry this out  Social Skills:  Dyadic Interaction (eye contact, makes needs known, goal directed conversation)  Eye contact: Good  Quality of Response: Clear and Concise  Goal Directed: Yes  False Beliefs: her comments were relevant to questions posed; she has had limited insight into how her actions and behaviors have affected her discharge She has been noted to refuse programs, groups on the unit  Assessment performed:    Pt is a 60 y o  female seen for OT evaluation s/p admit to [facility] on 7/23/2019 w/ Schizoaffective disorder, bipolar type ([facility] )    Comorbidities affecting pt's functional performance at time of assessment include: COPD and acquired hypothyroidism, GERD, hx of bipolar DO, hx of tobacco use disorder, hypokalemia  Personal factors affecting pt at time of IE include:limited home support, behavioral pattern, difficulty performing IADLS , limited insight into deficits, compliance, financial barriers, health management  and chronic illness, limited acceptance of appropriate discharge placement to places that are willing to accept her, decreased coping skills, decreased life management skills  Prior to admission, pt was residing at [facility]  Upon evaluation: Pt requires treatment with consideration of the following deficits impacting occupational performance: decreased tolerance, impaired initiation, impaired problem solving, impaired interpersonal skills, decreased coping skills and decreased erica management skills, acceptance of treatment, acceptance of realistic and available discharge alternatives, consistent cooperation with caregiver assistnace  From OT standpoint, recommendation at time of d/c would be supportive living environment (I e , [facility], personal care home) that Mike Bueno is accepting of when her behaviors have stabilized           Patient Goal:  "I'm working on housing, getting to groups, staying active with ACT team (still involved with [facility]), letting brother and sister know what is going on "    Plan: Mike Bueno was encouraged to go to programs on the [facility] unit  She stated that she would  She was reminded how active engagement in her treatment will help promote discharge  She did agree  OT will continue to monitor status while she is on the [facility] unit         Group Recommendations:     Discharge planning  Coping skills  Relapse prevention  Health living  Community living  Life management  Arts and crafts  Socialization  Relaxation  Stress management  Simple exercise  Positive focus  Current events  Kathia Cuenca, OT

## 2019-08-29 NOTE — PROGRESS NOTES
08/29/19 0900   Team Meeting   Meeting Type Daily Rounds   Team Members Present   Team Members Present Nurse;; Other (Discipline and Name)   Nursing Team Member Bonilla Domingo RN   Care Management Team Member Brenda Ellington   Other (Discipline and Name) Rhianna [place], [place]; SHANIKA Wick     Patient showered and washed her hair yesterday  Refusing PPD test  Slept

## 2019-08-29 NOTE — PLAN OF CARE
Problem: Alteration in Thoughts and Perception  Goal: Agree to be compliant with medication regime, as prescribed and report medication side effects  Description  Interventions:  - Offer appropriate PRN medication and supervise ingestion; conduct aims, as needed   Outcome: Progressing     Problem: PAIN - ADULT  Goal: Verbalizes/displays adequate comfort level or baseline comfort level  Description  Interventions:  - Encourage patient to monitor pain and request assistance  - Assess pain using appropriate pain scale  - Administer analgesics based on type and severity of pain and evaluate response  - Implement non-pharmacological measures as appropriate and evaluate response  - Consider cultural and social influences on pain and pain management  - Notify physician/advanced practitioner if interventions unsuccessful or patient reports new pain  Outcome: Progressing     Problem: SAFETY ADULT  Goal: Patient will remain free of falls  Description  INTERVENTIONS:  - Assess patient frequently for physical needs  -  Identify cognitive and physical deficits and behaviors that affect risk of falls    -  [place] fall precautions as indicated by assessment   - Educate patient/family on patient safety including physical limitations  - Instruct patient to call for assistance with activity based on assessment  - Modify environment to reduce risk of injury  - Consider OT/PT consult to assist with strengthening/mobility  Outcome: Progressing     Problem: RESPIRATORY - ADULT  Goal: Achieves optimal ventilation and oxygenation  Description  INTERVENTIONS:  - Assess for changes in respiratory status  - Assess for changes in mentation and behavior  - Position to facilitate oxygenation and minimize respiratory effort  - Oxygen administration by appropriate delivery method based on oxygen saturation (per order) or ABGs  - Initiate smoking cessation education as indicated  - Encourage broncho-pulmonary hygiene including cough, deep breathe, Incentive Spirometry  - Assess the need for suctioning and aspirate as needed  - Assess and instruct to report SOB or any respiratory difficulty  - Respiratory Therapy support as indicated  Outcome: Progressing   The patient slept through the night with no behaviors or issues noted  Oxygen maintained at 1 liter via NC  No s/s respiratory distress noted  Fall precautions maintained  Med compliant  Continue to monitor

## 2019-08-29 NOTE — PROGRESS NOTES
Progress Note - Violetta Villasenor 1955, 60 y o  female MRN: [id number]    Unit/Bed#: [facility] 879-50 Encounter: [id number]    Primary Care Provider: Bandar Mccall MD   Date and time admitted to hospital: 7/23/2019  5:30 PM        * Schizoaffective disorder, bipolar type [facility])  Assessment & Plan  Psychiatry Progress Note    Subjective: Interval History     Patient has refused PPD test consistently claiming that he does not want to be killed with a PPD test   She believes it is only done every 7 years and since she is no longer having any sputum with blood in a she believe she is fine but is willing to get the QuantiFERON test that was offered which will be attempted again tomorrow  She continues to be  preoccupied about her breathing ability even though she has been breathing fine with no visible breathing difficulties and has been using nasal oxygen at night  She is still not attending all the groups but did attend the [facility] group yesterday but not evening groups  and prefers to stay back on her bed even though she tells me that she will try to attend more groups again  She has been resistive to taking showers or braiding her hair and prefers to stay back on her bed most of the time  She continues to talking a stilted manner as usual   She does not admit to any overt hallucinations or paranoia but still appears to harbor some covert  paranoia as evident from her refusal to have PPD done  She still does not always trust certain nursing staff giving her the medications and is sometimes argumentative with staff and believes that [employer]ro[employer] has peanut oil in attend that she cannot have it  She hardly attends any groups even though she knows that she needs to attend some groups to be able to get privileges  She  still believes  that it is 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 and this has not changed since she came to the unit   She has chosen not to to use the portable oxygen to get out of bed and go to groups if necessary  She the still guarded suspicious evasive and in denial of her illness which has not changed since admission  No current suicidal homicidal thoughts intent or plans reported  No overt hallucinations or other delusions reported   No signs or sxs of agranulocytosis or myocarditis or endocarditis and she is moving her bowels so far with no issues    She was reminded to cooperate with the blood test as ordered by medical for the QuantiFERON test and was 0 also reminded to attend to her ADLs skills and attend groups  Current medications:    Current Facility-Administered Medications:     acetaminophen (TYLENOL) tablet 650 mg, 650 mg, Oral, Q6H PRN, Greer Beltran MD    albuterol (PROVENTIL HFA,VENTOLIN HFA) inhaler 2 puff, 2 puff, Inhalation, Q4H PRN, Greer Beltran MD, 2 puff at 07/25/19 1200    aluminum-magnesium hydroxide-simethicone (MYLANTA) 200-200-20 mg/5 mL oral suspension 15 mL, 15 mL, Oral, Q4H PRN, Greer Beltran MD    ammonium lactate (LAC-HYDRIN) 12 % lotion 1 application, 1 application, Topical, BID PRN, Greer Beltran MD    benzonatate (TESSALON PERLES) capsule 100 mg, 100 mg, Oral, TID PRN, Greer Beltran MD    benztropine (COGENTIN) injection 1 mg, 1 mg, Intramuscular, Q8H PRN, Greer Beltran MD    cloZAPine (CLOZARIL) tablet 50 mg, 50 mg, Oral, TID, Greer Beltran MD, 50 mg at 08/28/19 2136    EPINEPHrine PF (ADRENALIN) 1 mg/mL injection 0 15 mg, 0 15 mg, Intramuscular, Once PRN, Greer Beltran MD    FLUoxetine (PROzac) capsule 10 mg, 10 mg, Oral, Daily, Greer Beltran MD, 10 mg at 08/28/19 0840    fluticasone-vilanterol (BREO ELLIPTA) 200-25 MCG/INH inhaler 1 puff, 1 puff, Inhalation, Daily, Greer Beltran MD, 1 puff at 08/28/19 0842    ketotifen (ZADITOR) 0 025 % ophthalmic solution 1 drop, 1 drop, Right Eye, BID PRN, Greer Beltran MD    levothyroxine tablet 125 mcg, 125 mcg, Oral, Early Morning, Greer Beltran MD, 125 mcg at 08/29/19 0636    magnesium hydroxide (MILK OF MAGNESIA) 400 mg/5 mL oral suspension 30 mL, 30 mL, Oral, Daily PRN, Alirio Frazier MD    montelukast (SINGULAIR) tablet 10 mg, 10 mg, Oral, HS, Alirio Frazier MD, 10 mg at 08/28/19 2136    OLANZapine (ZyPREXA) IM injection 5 mg, 5 mg, Intramuscular, Q8H PRN, Alirio Frazier MD    OLANZapine (ZyPREXA) tablet 5 mg, 5 mg, Oral, Q8H PRN, Alirio Frazier MD    pantoprazole (PROTONIX) EC tablet 40 mg, 40 mg, Oral, Early Morning, Alirio Frazier MD, 40 mg at 08/29/19 0636    polyethylene glycol (MIRALAX) packet 17 g, 17 g, Oral, Daily PRN, Alirio Frazier MD    polyvinyl alcohol (LIQUIFILM TEARS) 1 4 % ophthalmic solution 1 drop, 1 drop, Both Eyes, Q3H PRN, Alirio Frazier MD    theophylline (JEF-24) 24 hr capsule 200 mg, 200 mg, Oral, Daily, Alirio Frazier MD, 200 mg at 08/28/19 0840    tiotropium (SPIRIVA) capsule for inhaler 18 mcg, 18 mcg, Inhalation, Daily, Alirio Frazier MD, 18 mcg at 08/28/19 9842    traZODone (DESYREL) tablet 25 mg, 25 mg, Oral, HS PRN, Alirio Frazier MD    tuberculin injection 5 Units, 5 Units, Intradermal, Once, Alirio Frazier MD, Stopped at 08/26/19 1326  Justification if on more than two antipsychotics:  Only on his clozapine  Side effects if any:  None    Risks , benefits, side effects and precautions of medications discussed with patient and reminded patient to let us know any problems with medications     Objective:     Vital Signs:  Vitals:    08/28/19 0730 08/28/19 1500 08/28/19 1900 08/28/19 2130   BP: 120/78 105/73 108/67    BP Location: Right arm Left arm Left arm    Pulse: 94 (!) 108 92    Resp: 18 16 16    Temp: 97 7 °F (36 5 °C) 97 7 °F (36 5 °C) (!) 97 3 °F (36 3 °C)    TempSrc:  Temporal Temporal    SpO2:  94% 96% 93%   Weight:       Height:         Appearance:  age appropriate, casually dressed and overweight older than stated age   Behavior:  deviant, evasive and guarded and suspicious but with good eye contact   Speech:  normal pitch and normal volume but tends to become loud at times   Mood: anxious and dysthymic   Affect:  mood-congruent   Thought Process:  goal directed and illogical slightly pressured but able to be redirected and talks as if in a court of low being stilted   Thought Content:  delusions  grandiose and somatic about not being able to breathe despite being on nasal oxygen and paranoid about people out to harm her and Atrovent inhaler tainteded with peanut oil  No current suicidal homicidal thoughts intent or plans reported  No phobias obsessions or compulsions reported   Perceptual Disturbances: None and does not appear responding to internal stimuli   Risk Potential: Tendency to not care for herself if she goes off treatment   Sensorium:  person, place, time/date, situation, day of week, month of year and time   Cognition:  grossly intact   Consciousness:  alert and awake    Attention: Intact concentration and attention span   Intellect: Considered to be at least of average intelligence   Insight:  limited in denial   Judgment: poor      Motor Activity: no abnormal movements         Recent Labs:  Results Reviewed     None          I/O Past 24 hours:  No intake/output data recorded  No intake/output data recorded          Assessment / Plan:     Schizoaffective disorder, bipolar type ([facility]      Reason for continued inpatient care:  Because of underlying paranoia and refusal to go back to the personal care home and inability to care for herself on her own  Acceptance by patient:  Accepting  Mateusz Mcghee in recovery:  About living in another personal care home once she feels better  Understanding of medications :  Has some understanding  Involved in reintegration process:  Adjusting to the unit  Trusting in relatoinship with psychiatrist:  Trusting    Recommended Treatment:    Medication changes:  1) none today  Non-pharmacological treatments  1) start individual therapy group therapy, milieu therapy and occupational therapy and milieu therapy involving multidisciplinary team approach with psychotherapist, case management, nursing, peer support services, art therapist etc using recovery principles and psycho-education about accepting illness and need for treatment   3) check laps and encourage p o  Fluids because of low-grade fever  Safety  1) Safety/communication plan established targeting dynamic risk factors above  Discharge Plan most likely back to the a:  Personal care boarding home with act services once her delusions are managed and mood becomes more stabilized and she becomes more receptive to treatment compliance    Counseling / Coordination of Care    Total floor / unit time spent today 15 minutes  Greater than 50% of total time was spent with the patient and / or family counseling and / or coordination of care  A description of the counseling / coordination of care  Patient's Rights, confidentiality and exceptions to confidentiality, use of automated medical record, [address] [place] [address] staff access to medical record, and consent to treatment reviewed      Mynor Terry MD

## 2019-08-29 NOTE — PLAN OF CARE
Problem: Alteration in Thoughts and Perception  Goal: Agree to be compliant with medication regime, as prescribed and report medication side effects  Description  Interventions:  - Offer appropriate PRN medication and supervise ingestion; conduct aims, as needed   Outcome: Progressing     Problem: Ineffective Coping  Goal: Participates in unit activities  Description  Interventions:  - Provide therapeutic environment   - Provide required programming   - Redirect inappropriate behaviors   Outcome: Progressing     Problem: Risk for Self Injury/Neglect  Goal: Verbalize thoughts and feelings  Description  Interventions:  - Assess and re-assess patient's lethality and potential for self-injury  - Engage patient in 1:1 interactions, daily, for a minimum of 15 minutes  - Encourage patient to express feelings, fears, frustrations, hopes  - Establish rapport/trust with patient   8/29/2019 [id number] by Jade Rice RN  Outcome: Progressing  8/29/2019 0631 by Jaed Rice RN  Outcome: Progressing  Goal: Attend and participate in unit activities, including therapeutic, recreational, and educational groups  Description  Interventions:  - Provide therapeutic and educational activities daily, encourage attendance and participation, and document same in the medical record  - Obtain collateral information, encourage visitation and family involvement in care   Outcome: Progressing  Goal: Complete daily ADLs, including personal hygiene independently, as able  Description  Interventions:  - Observe, teach, and assist patient with ADLS  - Monitor and promote a balance of rest/activity, with adequate nutrition and elimination  8/29/2019 0632 by Jade Rice RN  Outcome: Progressing  8/29/2019 0631 by Jade Rice RN  Outcome: Progressing     Problem: Depression  Goal: Verbalize thoughts and feelings  Description  Interventions:  - Assess and re-assess patient's level of risk   - Engage patient in 1:1 interactions, daily, for a minimum of 15 minutes   - Encourage patient to express feelings, fears, frustrations, hopes   Outcome: Progressing  Goal: Refrain from isolation  Description  Interventions:  - Develop a trusting relationship   - Encourage socialization   Outcome: Progressing  Goal: Refrain from self-neglect  Outcome: Progressing     Problem: Anxiety  Goal: Anxiety is at manageable level  Description  Interventions:  - Assess and monitor patient's anxiety level  - Monitor for signs and symptoms of anxiety both physical and emotional (heart palpitations, chest pain, shortness of breath, headaches, nausea, feeling jumpy, restlessness, irritable, apprehensive)  - Collaborate with interdisciplinary team and initiate plan and interventions as ordered  - [place] patient to unit/surroundings  - Explain treatment plan  - Encourage participation in care  - Encourage verbalization of concerns/fears  - Identify coping mechanisms  - Assist in developing anxiety-reducing skills  - Administer/offer alternative therapies  - Limit or eliminate stimulants  8/29/2019 2315 by Sylvie Patel RN  Outcome: Progressing  8/29/2019 0631 by Sylvie Patel RN  Outcome: Progressing     Problem: Alteration in Orientation  Goal: Interact with staff daily  Description  Interventions:  - Assess and re-assess patient's level of orientation  - Engage patient in 1 on 1 interactions, daily, for a minimum of 15 minutes   - Establish rapport/trust with patient   8/29/2019 2966 by Sylvie Patel RN  Outcome: Progressing  8/29/2019 0631 by Sylvie Patel RN  Outcome: Progressing     Problem: SAFETY ADULT  Goal: Patient will remain free of falls  Description  INTERVENTIONS:  - Assess patient frequently for physical needs  -  Identify cognitive and physical deficits and behaviors that affect risk of falls    -  [place] fall precautions as indicated by assessment   - Educate patient/family on patient safety including physical limitations  - Instruct patient to call for assistance with activity based on assessment  - Modify environment to reduce risk of injury  - Consider OT/PT consult to assist with strengthening/mobility  Outcome: Progressing     FALL Precautions continued, showered and washed hair with [facility] staff assistance and joined IMR group in progress, Appetite intact 100% of breakfast and 75% of lunch out compliant with routine medications did not require prompting, continues to refuse PPD to rule out TB, stated to this writer, "I would rather do the bloodwork "   Incentive Spirometry Q4H while awake, improved averages 5-6 breaths @1000ml also encouraging her to complete oral care after inhalers, fluids encouraged, activity encouraged, encouragement and positive reinforcement attempted for allowing staff to help her with her hygiene and beauty needs this morning

## 2019-08-29 NOTE — ASSESSMENT & PLAN NOTE
Psychiatry Progress Note    Subjective: Interval History     Patient has refused PPD test consistently claiming that he does not want to be killed with a PPD test   She believes it is only done every 7 years and since she is no longer having any sputum with blood in a she believe she is fine but is willing to get the QuantiFERON test that was offered which will be attempted again tomorrow  She continues to be  preoccupied about her breathing ability even though she has been breathing fine with no visible breathing difficulties and has been using nasal oxygen at night  She is still not attending all the groups but did attend the [facility] group yesterday but not evening groups  and prefers to stay back on her bed even though she tells me that she will try to attend more groups again  She has been resistive to taking showers or braiding her hair and prefers to stay back on her bed most of the time  She continues to talking a stilted manner as usual   She does not admit to any overt hallucinations or paranoia but still appears to harbor some covert  paranoia as evident from her refusal to have PPD done  She still does not always trust certain nursing staff giving her the medications and is sometimes argumentative with staff and believes that [employer] has peanut oil in attend that she cannot have it  She hardly attends any groups even though she knows that she needs to attend some groups to be able to get privileges  She  still believes  that it is 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 and this has not changed since she came to the unit  She has chosen not to to use the portable oxygen to get out of bed and go to groups if necessary  She the still guarded suspicious evasive and in denial of her illness which has not changed since admission  No current suicidal homicidal thoughts intent or plans reported  No overt hallucinations or other delusions reported    No signs or sxs of agranulocytosis or myocarditis or endocarditis and she is moving her bowels so far with no issues    She was reminded to cooperate with the blood test as ordered by medical for the QuantiFERON test and was 0 also reminded to attend to her ADLs skills and attend groups  Current medications:    Current Facility-Administered Medications:     acetaminophen (TYLENOL) tablet 650 mg, 650 mg, Oral, Q6H PRN, Roberto Colorado MD    albuterol (PROVENTIL HFA,VENTOLIN HFA) inhaler 2 puff, 2 puff, Inhalation, Q4H PRN, Roberto Colorado MD, 2 puff at 07/25/19 1200    aluminum-magnesium hydroxide-simethicone (MYLANTA) 200-200-20 mg/5 mL oral suspension 15 mL, 15 mL, Oral, Q4H PRN, Roberto Colorado MD    ammonium lactate (LAC-HYDRIN) 12 % lotion 1 application, 1 application, Topical, BID PRN, Roberto Colorado MD    benzonatate (TESSALON PERLES) capsule 100 mg, 100 mg, Oral, TID PRN, Roberto Coolrado MD    benztropine (COGENTIN) injection 1 mg, 1 mg, Intramuscular, Q8H PRN, Roberto Colorado MD    cloZAPine (CLOZARIL) tablet 50 mg, 50 mg, Oral, TID, Roberto Colorado MD, 50 mg at 08/28/19 2136    EPINEPHrine PF (ADRENALIN) 1 mg/mL injection 0 15 mg, 0 15 mg, Intramuscular, Once PRN, Roberto Colorado MD    FLUoxetine (PROzac) capsule 10 mg, 10 mg, Oral, Daily, Roberto Colorado MD, 10 mg at 08/28/19 0840    fluticasone-vilanterol (BREO ELLIPTA) 200-25 MCG/INH inhaler 1 puff, 1 puff, Inhalation, Daily, Roberto Colorado MD, 1 puff at 08/28/19 0842    ketotifen (ZADITOR) 0 025 % ophthalmic solution 1 drop, 1 drop, Right Eye, BID PRN, Roberto Colorado MD    levothyroxine tablet 125 mcg, 125 mcg, Oral, Early Morning, Roberto Colorado MD, 125 mcg at 08/29/19 0636    magnesium hydroxide (MILK OF MAGNESIA) 400 mg/5 mL oral suspension 30 mL, 30 mL, Oral, Daily PRN, Roberto Colorado MD    montelukast (SINGULAIR) tablet 10 mg, 10 mg, Oral, HS, Roberto Colorado MD, 10 mg at 08/28/19 2136    OLANZapine (ZyPREXA) IM injection 5 mg, 5 mg, Intramuscular, Q8H PRN, Roberto Colorado MD    OLANZapine (ZyPREXA) tablet 5 mg, 5 mg, Oral, Q8H PRN, Carissa Willis MD    pantoprazole (PROTONIX) EC tablet 40 mg, 40 mg, Oral, Early Morning, Carissa Willis MD, 40 mg at 08/29/19 0636    polyethylene glycol (MIRALAX) packet 17 g, 17 g, Oral, Daily PRN, Carissa Willis MD    polyvinyl alcohol (LIQUIFILM TEARS) 1 4 % ophthalmic solution 1 drop, 1 drop, Both Eyes, Q3H PRN, Carissa Willis MD    theophylline (JEF-24) 24 hr capsule 200 mg, 200 mg, Oral, Daily, Carissa Willis MD, 200 mg at 08/28/19 0840    tiotropium [facility]) capsule for inhaler 18 mcg, 18 mcg, Inhalation, Daily, Carissa Willis MD, 18 mcg at 08/28/19 7007    traZODone (DESYREL) tablet 25 mg, 25 mg, Oral, HS PRN, Carissa Willis MD    tuberculin injection 5 Units, 5 Units, Intradermal, Once, Carissa Willis MD, Stopped at 08/26/19 1326  Justification if on more than two antipsychotics:  Only on his clozapine  Side effects if any:  None    Risks , benefits, side effects and precautions of medications discussed with patient and reminded patient to let us know any problems with medications     Objective:     Vital Signs:  Vitals:    08/28/19 0730 08/28/19 1500 08/28/19 1900 08/28/19 2130   BP: 120/78 105/73 108/67    BP Location: Right arm Left arm Left arm    Pulse: 94 (!) 108 92    Resp: 18 16 16    Temp: 97 7 °F (36 5 °C) 97 7 °F (36 5 °C) (!) 97 3 °F (36 3 °C)    TempSrc:  Temporal Temporal    SpO2:  94% 96% 93%   Weight:       Height:         Appearance:  age appropriate, casually dressed and overweight older than stated age   Behavior:  deviant, evasive and guarded and suspicious but with good eye contact   Speech:  normal pitch and normal volume but tends to become loud at times   Mood:  anxious and dysthymic   Affect:  mood-congruent   Thought Process:  goal directed and illogical slightly pressured but able to be redirected and talks as if in a court of low being stilted   Thought Content:  delusions  grandiose and somatic about not being able to breathe despite being on nasal oxygen and paranoid about people out to harm her and [employer]ro[employer] inhaler tainteded with peanut oil  No current suicidal homicidal thoughts intent or plans reported  No phobias obsessions or compulsions reported   Perceptual Disturbances: None and does not appear responding to internal stimuli   Risk Potential: Tendency to not care for herself if she goes off treatment   Sensorium:  person, place, time/date, situation, day of week, month of year and time   Cognition:  grossly intact   Consciousness:  alert and awake    Attention: Intact concentration and attention span   Intellect: Considered to be at least of average intelligence   Insight:  limited in denial   Judgment: poor      Motor Activity: no abnormal movements         Recent Labs:  Results Reviewed     None          I/O Past 24 hours:  No intake/output data recorded  No intake/output data recorded  Assessment / Plan:     [facility]      Reason for continued inpatient care:  Because of underlying paranoia and refusal to go back to the personal care home and inability to care for herself on her own  Acceptance by patient:  Accepting  Juan Lisa in recovery:  About living in another personal care home once she feels better  Understanding of medications :  Has some understanding  Involved in reintegration process:  Adjusting to the unit  Trusting in relatoinship with psychiatrist:  Trusting    Recommended Treatment:    Medication changes:  1) none today  Non-pharmacological treatments  1) start individual therapy group therapy, milieu therapy and occupational therapy and milieu therapy involving multidisciplinary team approach with psychotherapist, case management, nursing, peer support services, art therapist etc using recovery principles and psycho-education about accepting illness and need for treatment   3) check laps and encourage p o   Fluids because of low-grade fever  Safety  1) Safety/communication plan established targeting dynamic risk factors above  Discharge Plan most likely back to the a:  Personal care boarding home with act services once her delusions are managed and mood becomes more stabilized and she becomes more receptive to treatment compliance    Counseling / Coordination of Care    Total floor / unit time spent today 15 minutes  Greater than 50% of total time was spent with the patient and / or family counseling and / or coordination of care  A description of the counseling / coordination of care  Patient's Rights, confidentiality and exceptions to confidentiality, use of automated medical record, [facility] staff access to medical record, and consent to treatment reviewed      Sandhya Bolaños MD

## 2019-08-30 LAB
INR PPP: 0.95 (ref 0.91–1.09)
PROTHROMBIN TIME: 10.6 SECONDS (ref 9.8–12)

## 2019-08-30 PROCEDURE — 86480 TB TEST CELL IMMUN MEASURE: CPT | Performed by: FAMILY MEDICINE

## 2019-08-30 PROCEDURE — 85610 PROTHROMBIN TIME: CPT | Performed by: FAMILY MEDICINE

## 2019-08-30 PROCEDURE — 99231 SBSQ HOSP IP/OBS SF/LOW 25: CPT | Performed by: PSYCHIATRY & NEUROLOGY

## 2019-08-30 RX ADMIN — PANTOPRAZOLE SODIUM 40 MG: 40 TABLET, DELAYED RELEASE ORAL at 05:52

## 2019-08-30 RX ADMIN — CLOZAPINE 50 MG: 25 TABLET ORAL at 08:58

## 2019-08-30 RX ADMIN — FLUTICASONE FUROATE AND VILANTEROL TRIFENATATE 1 PUFF: 200; 25 POWDER RESPIRATORY (INHALATION) at 08:59

## 2019-08-30 RX ADMIN — LEVOTHYROXINE SODIUM 125 MCG: 125 TABLET ORAL at 05:52

## 2019-08-30 RX ADMIN — THEOPHYLLINE ANHYDROUS 200 MG: 200 CAPSULE, EXTENDED RELEASE ORAL at 08:58

## 2019-08-30 RX ADMIN — MONTELUKAST SODIUM 10 MG: 10 TABLET, COATED ORAL at 21:33

## 2019-08-30 RX ADMIN — FLUOXETINE 10 MG: 10 CAPSULE ORAL at 08:58

## 2019-08-30 RX ADMIN — CLOZAPINE 50 MG: 25 TABLET ORAL at 18:02

## 2019-08-30 RX ADMIN — CLOZAPINE 50 MG: 25 TABLET ORAL at 21:33

## 2019-08-30 RX ADMIN — TIOTROPIUM BROMIDE 18 MCG: 18 CAPSULE ORAL; RESPIRATORY (INHALATION) at 08:59

## 2019-08-30 NOTE — ASSESSMENT & PLAN NOTE
Psychiatry Progress Note    Subjective: Interval History     Patient is again refusing PPD test consistently claiming that she does not want to be killed with a PPD test   She believes it is only done every 7 years and since she is no longer having any sputum with blood   She did finally cooperate with the  QuantiFERON test  and the results are pending  He is still refusing to attend any of the groups and preferring to lay back on bed claiming that she feels tired or feeling sec or cannot breathe  She continues to be  preoccupied about her breathing ability even though she has been breathing fine with no visible breathing difficulties and has been using nasal oxygen at night  She has been resistive to taking showers or braiding her hair and prefers to stay back on her bed most of the time  She continues to talking in a stilted manner as usual   She does not admit to any overt hallucinations or paranoia but still appears to harbor some covert  paranoia as evident from her refusal to have PPD done and does not always trust certain nursing staff giving her the medications  She is still argumentative with nursing staff about certain medications and continues to believe that [employer] has peanut oil in attend that she cannot have it  She hardly attends any groups even though she knows that she needs to attend some groups to be able to get privileges  She  still believes  that it is 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 and this has not changed since she came to the unit  She has chosen not to to use the portable oxygen to get out of bed and go to groups if necessary  She the still guarded suspicious evasive and in denial of her illness which has not changed since admission  No current suicidal homicidal thoughts intent or plans reported  No overt hallucinations or other delusions reported    No signs or sxs of agranulocytosis or myocarditis or endocarditis and she is moving her bowels so far with no issues    She was again reminded to start attending groups and she claims she will   Current medications:    Current Facility-Administered Medications:     acetaminophen (TYLENOL) tablet 650 mg, 650 mg, Oral, Q6H PRN, Jill Gayle MD    albuterol (PROVENTIL HFA,VENTOLIN HFA) inhaler 2 puff, 2 puff, Inhalation, Q4H PRN, Jill Gayle MD, 2 puff at 07/25/19 1200    aluminum-magnesium hydroxide-simethicone (MYLANTA) 200-200-20 mg/5 mL oral suspension 15 mL, 15 mL, Oral, Q4H PRN, Jill Gayle MD    ammonium lactate (LAC-HYDRIN) 12 % lotion 1 application, 1 application, Topical, BID PRN, Jill Gayle MD    benzonatate (TESSALON PERLES) capsule 100 mg, 100 mg, Oral, TID PRN, Jill Gayle MD    benztropine (COGENTIN) injection 1 mg, 1 mg, Intramuscular, Q8H PRN, Jill Gayle MD    cloZAPine (CLOZARIL) tablet 50 mg, 50 mg, Oral, TID, Jill Gayle MD, 50 mg at 08/30/19 0858    EPINEPHrine PF (ADRENALIN) 1 mg/mL injection 0 15 mg, 0 15 mg, Intramuscular, Once PRN, Jill Gayle MD    FLUoxetine (PROzac) capsule 10 mg, 10 mg, Oral, Daily, Jill Gayle MD, 10 mg at 08/30/19 0858    fluticasone-vilanterol (BREO ELLIPTA) 200-25 MCG/INH inhaler 1 puff, 1 puff, Inhalation, Daily, Jill Gayle MD, 1 puff at 08/30/19 0859    ketotifen (ZADITOR) 0 025 % ophthalmic solution 1 drop, 1 drop, Right Eye, BID PRN, Jill Gayle MD    levothyroxine tablet 125 mcg, 125 mcg, Oral, Early Morning, Jill Gayle MD, 125 mcg at 08/30/19 3039    magnesium hydroxide (MILK OF MAGNESIA) 400 mg/5 mL oral suspension 30 mL, 30 mL, Oral, Daily PRN, Jill Gayle MD    montelukast (SINGULAIR) tablet 10 mg, 10 mg, Oral, HS, Jill Gayle MD, 10 mg at 08/29/19 2123    OLANZapine (ZyPREXA) IM injection 5 mg, 5 mg, Intramuscular, Q8H PRN, Jill Gayle MD    OLANZapine (ZyPREXA) tablet 5 mg, 5 mg, Oral, Q8H PRN, Jill Gayle MD    pantoprazole (PROTONIX) EC tablet 40 mg, 40 mg, Oral, Early Morning, Jill Gayle MD, 40 mg at 08/30/19 4337    polyethylene glycol (MIRALAX) packet 17 g, 17 g, Oral, Daily PRN, Jennifer Taveras MD    polyvinyl alcohol (LIQUIFILM TEARS) 1 4 % ophthalmic solution 1 drop, 1 drop, Both Eyes, Q3H PRN, Jennifer Taveras MD    theophylline (JEF-24) 24 hr capsule 200 mg, 200 mg, Oral, Daily, Jennifer Taveras MD, 200 mg at 08/30/19 0858    tiotropium [facility]) capsule for inhaler 18 mcg, 18 mcg, Inhalation, Daily, Jennifer Taveras MD, 18 mcg at 08/30/19 0859    traZODone (DESYREL) tablet 25 mg, 25 mg, Oral, HS PRN, Jennifer Taveras MD    tuberculin injection 5 Units, 5 Units, Intradermal, Once, Jennifer Taveras MD, Stopped at 08/26/19 1326  Justification if on more than two antipsychotics:  Only on his clozapine  Side effects if any:  None    Risks , benefits, side effects and precautions of medications discussed with patient and reminded patient to let us know any problems with medications     Objective:     Vital Signs:  Vitals:    08/29/19 0705 08/29/19 1604 08/29/19 1900 08/30/19 0730   BP: 112/80 130/97 113/67 110/71   BP Location: Left arm Left arm Left arm Left arm   Pulse: (!) 107 95 97 92   Resp: 18 18 17 17   Temp:  97 5 °F (36 4 °C) (!) 97 2 °F (36 2 °C) 97 9 °F (36 6 °C)   TempSrc:  Temporal Temporal Temporal   SpO2:  90% 96%    Weight:       Height:         Appearance:  age appropriate, casually dressed and overweight older than stated age   Behavior:  deviant, evasive and guarded and suspicious but with good eye contact   Speech:  normal pitch and normal volume but tends to become loud at times   Mood:  anxious and dysthymic   Affect:  mood-congruent   Thought Process:  goal directed and illogical slightly pressured but able to be redirected and talks as if in a court of low being stilted   Thought Content:  delusions  grandiose and somatic about not being able to breathe despite being on nasal oxygen and paranoid about people out to harm her and [employer]ro[employer] inhaler tainteded with peanut oil    No current suicidal homicidal thoughts intent or plans reported  No phobias obsessions or compulsions reported   Perceptual Disturbances: None and does not appear responding to internal stimuli   Risk Potential: Tendency to not care for herself if she goes off treatment   Sensorium:  person, place, time/date, situation, day of week, month of year and time   Cognition:  grossly intact   Consciousness:  alert and awake    Attention: Intact concentration and attention span   Intellect: Considered to be at least of average intelligence   Insight:  limited in denial   Judgment: poor      Motor Activity: no abnormal movements         Recent Labs:  Results Reviewed     None          I/O Past 24 hours:  No intake/output data recorded  No intake/output data recorded  Assessment / Plan:     Schizoaffective disorder, bipolar type ([facility] )      Reason for continued inpatient care:  Because of underlying paranoia and refusal to go back to the personal care home and inability to care for herself on her own  Acceptance by patient:  Accepting  Ros Patel in recovery:  About living in another personal care home once she feels better  Understanding of medications :  Has some understanding  Involved in reintegration process:  Adjusting to the unit  Trusting in relatoinship with psychiatrist:  Trusting    Recommended Treatment:    Medication changes:  1) none today  Non-pharmacological treatments  1) start individual therapy group therapy, milieu therapy and occupational therapy and milieu therapy involving multidisciplinary team approach with psychotherapist, case management, nursing, peer support services, art therapist etc using recovery principles and psycho-education about accepting illness and need for treatment   3) check laps and encourage p o  Fluids because of low-grade fever  Safety  1) Safety/communication plan established targeting dynamic risk factors above    Discharge Plan most likely back to the a:  Personal care boarding home with act services once her delusions are managed and mood becomes more stabilized and she becomes more receptive to treatment compliance    Counseling / Coordination of Care    Total floor / unit time spent today 15 minutes  Greater than 50% of total time was spent with the patient and / or family counseling and / or coordination of care  A description of the counseling / coordination of care  Patient's Rights, confidentiality and exceptions to confidentiality, use of automated medical record, [facility] staff access to medical record, and consent to treatment reviewed      Jaz Beasley MD

## 2019-08-30 NOTE — PLAN OF CARE
Problem: Alteration in Thoughts and Perception  Goal: Verbalize thoughts and feelings  Description  Interventions:  - Promote a nonjudgmental and trusting relationship with the patient through active listening and therapeutic communication  - Assess patient's level of functioning, behavior and potential for risk  - Engage patient in 1 on 1 interactions for a minimum of 15 minutes each session  - Encourage patient to express fears, feelings, frustrations, and discuss symptoms    - [place] patient to reality, help patient recognize reality-based thinking   - Administer medications as ordered and assess for potential side effects  - Provide the patient education related to the signs and symptoms of the illness and desired effects of prescribed medications  Outcome: Not Progressing  Goal: Agree to be compliant with medication regime, as prescribed and report medication side effects  Description  Interventions:  - Offer appropriate PRN medication and supervise ingestion; conduct aims, as needed   Outcome: Progressing     Problem: Ineffective Coping  Goal: Identifies healthy coping skills  Outcome: Not Progressing  Goal: Participates in unit activities  Description  Interventions:  - Provide therapeutic environment   - Provide required programming   - Redirect inappropriate behaviors   Outcome: Not Progressing     Problem: Depression  Goal: Refrain from isolation  Description  Interventions:  - Develop a trusting relationship   - Encourage socialization   Outcome: Not Progressing    8/30/19 Shift 7-3  FALL Precautions  BM-30 Shower 8/28   Pt isolative to room throughout shift, did come out for meals, required prompting with medications  Did not attend any groups this shift and no interaction with peers observed  Encouraged to be more out of her bed to attend groups and ambulate but states that she feels tired and stays in bed except for meals

## 2019-08-30 NOTE — PROGRESS NOTES
08/30/19 0800   Team Meeting   Meeting Type Daily Rounds   Team Members Present   Team Members Present Physician;Nurse;; Other (Discipline and Name)   Physician Team Member Dr Tyra Herrera Team Member Surinder Brewster RN   Care Management Team Member Brenda Irwin   Other (Discipline and Name) SHANIKA Aguilar     Patient did not attend any groups yesterday  Patient reported she was experiencing anxiety but wanted to "rest and cope by herself"  Requesting a dietary consult due to disliking the [employer]ure and wanting other options  Slept

## 2019-08-30 NOTE — PROGRESS NOTES
Progress Note - Dillon Butt 1955, 60 y o  female MRN: [id number]    [facility] Encounter: [id number]    Primary Care Provider: Alyssa Sanon MD   Date and time admitted to hospital: 7/23/2019  5:30 PM        * Schizoaffective disorder, bipolar type [facility])  Assessment & Plan  Psychiatry Progress Note    Subjective: Interval History     Patient is again refusing PPD test consistently claiming that she does not want to be killed with a PPD test   She believes it is only done every 7 years and since she is no longer having any sputum with blood   She did finally cooperate with the  QuantiFERON test  and the results are pending  He is still refusing to attend any of the groups and preferring to lay back on bed claiming that she feels tired or feeling sec or cannot breathe  She continues to be  preoccupied about her breathing ability even though she has been breathing fine with no visible breathing difficulties and has been using nasal oxygen at night  She has been resistive to taking showers or braiding her hair and prefers to stay back on her bed most of the time  She continues to talking in a stilted manner as usual   She does not admit to any overt hallucinations or paranoia but still appears to harbor some covert  paranoia as evident from her refusal to have PPD done and does not always trust certain nursing staff giving her the medications  She is still argumentative with nursing staff about certain medications and continues to believe that Atrovent has peanut oil in attend that she cannot have it  She hardly attends any groups even though she knows that she needs to attend some groups to be able to get privileges  She  still believes  that it is 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 and this has not changed since she came to the unit  She has chosen not to to use the portable oxygen to get out of bed and go to groups if necessary    She the still guarded suspicious evasive and in denial of her illness which has not changed since admission  No current suicidal homicidal thoughts intent or plans reported  No overt hallucinations or other delusions reported   No signs or sxs of agranulocytosis or myocarditis or endocarditis and she is moving her bowels so far with no issues    She was again reminded to start attending groups and she claims she will   Current medications:    Current Facility-Administered Medications:     acetaminophen (TYLENOL) tablet 650 mg, 650 mg, Oral, Q6H PRN, Chichi Lockett MD    albuterol (PROVENTIL HFA,VENTOLIN HFA) inhaler 2 puff, 2 puff, Inhalation, Q4H PRN, Chichi Lockett MD, 2 puff at 07/25/19 1200    aluminum-magnesium hydroxide-simethicone (MYLANTA) 200-200-20 mg/5 mL oral suspension 15 mL, 15 mL, Oral, Q4H PRN, Chichi Lockett MD    ammonium lactate (LAC-HYDRIN) 12 % lotion 1 application, 1 application, Topical, BID PRN, Chichi Lockett MD    benzonatate (TESSALON PERLES) capsule 100 mg, 100 mg, Oral, TID PRN, Chichi Lockett MD    benztropine (COGENTIN) injection 1 mg, 1 mg, Intramuscular, Q8H PRN, Chichi Lockett MD    cloZAPine (CLOZARIL) tablet 50 mg, 50 mg, Oral, TID, Chichi Lockett MD, 50 mg at 08/30/19 0858    EPINEPHrine PF (ADRENALIN) 1 mg/mL injection 0 15 mg, 0 15 mg, Intramuscular, Once PRN, Chichi Lockett MD    FLUoxetine (PROzac) capsule 10 mg, 10 mg, Oral, Daily, Chichi Lockett MD, 10 mg at 08/30/19 0858    fluticasone-vilanterol (BREO ELLIPTA) 200-25 MCG/INH inhaler 1 puff, 1 puff, Inhalation, Daily, Chichi Lockett MD, 1 puff at 08/30/19 0859    ketotifen (ZADITOR) 0 025 % ophthalmic solution 1 drop, 1 drop, Right Eye, BID PRN, Chichi Lockett MD    levothyroxine tablet 125 mcg, 125 mcg, Oral, Early Morning, Chichi Lockett MD, 125 mcg at 08/30/19 3258    magnesium hydroxide (MILK OF MAGNESIA) 400 mg/5 mL oral suspension 30 mL, 30 mL, Oral, Daily PRN, Chichi Lockett MD    montelukast (SINGULAIR) tablet 10 mg, 10 mg, Oral, HS, Chichi Lockett, MD, 10 mg at 08/29/19 2123    OLANZapine (ZyPREXA) IM injection 5 mg, 5 mg, Intramuscular, Q8H PRN, Roberto Colorado MD    OLANZapine (ZyPREXA) tablet 5 mg, 5 mg, Oral, Q8H PRN, Roberto Colorado MD    pantoprazole (PROTONIX) EC tablet 40 mg, 40 mg, Oral, Early Morning, Roberto Colorado MD, 40 mg at 08/30/19 0657    polyethylene glycol (MIRALAX) packet 17 g, 17 g, Oral, Daily PRN, Roberto Colorado MD    polyvinyl alcohol (LIQUIFILM TEARS) 1 4 % ophthalmic solution 1 drop, 1 drop, Both Eyes, Q3H PRN, Roberto Colorado MD    theophylline (JEF-24) 24 hr capsule 200 mg, 200 mg, Oral, Daily, Roberto Colorado MD, 200 mg at 08/30/19 0858    ti[facility]) capsule for inhaler 18 mcg, 18 mcg, Inhalation, Daily, Roberto Colorado MD, 18 mcg at 08/30/19 0859    traZODone (DESYREL) tablet 25 mg, 25 mg, Oral, HS PRN, Roberto Colorado MD    tuberculin injection 5 Units, 5 Units, Intradermal, Once, Roberto Colorado MD, Stopped at 08/26/19 1326  Justification if on more than two antipsychotics:  Only on his clozapine  Side effects if any:  None    Risks , benefits, side effects and precautions of medications discussed with patient and reminded patient to let us know any problems with medications     Objective:     Vital Signs:  Vitals:    08/29/19 0705 08/29/19 1604 08/29/19 1900 08/30/19 0730   BP: 112/80 130/97 113/67 110/71   BP Location: Left arm Left arm Left arm Left arm   Pulse: (!) 107 95 97 92   Resp: 18 18 17 17   Temp:  97 5 °F (36 4 °C) (!) 97 2 °F (36 2 °C) 97 9 °F (36 6 °C)   TempSrc:  Temporal Temporal Temporal   SpO2:  90% 96%    Weight:       Height:         Appearance:  age appropriate, casually dressed and overweight older than stated age   Behavior:  deviant, evasive and guarded and suspicious but with good eye contact   Speech:  normal pitch and normal volume but tends to become loud at times   Mood:  anxious and dysthymic   Affect:  mood-congruent   Thought Process:  goal directed and illogical slightly pressured but able to be redirected and talks as if in a court of low being stilted   Thought Content:  delusions  grandiose and somatic about not being able to breathe despite being on nasal oxygen and paranoid about people out to harm her and Atrovent inhaler tainteded with peanut oil  No current suicidal homicidal thoughts intent or plans reported  No phobias obsessions or compulsions reported   Perceptual Disturbances: None and does not appear responding to internal stimuli   Risk Potential: Tendency to not care for herself if she goes off treatment   Sensorium:  person, place, time/date, situation, day of week, month of year and time   Cognition:  grossly intact   Consciousness:  alert and awake    Attention: Intact concentration and attention span   Intellect: Considered to be at least of average intelligence   Insight:  limited in denial   Judgment: poor      Motor Activity: no abnormal movements         Recent Labs:  Results Reviewed     None          I/O Past 24 hours:  No intake/output data recorded  No intake/output data recorded          Assessment / Plan:     [facility]      Reason for continued inpatient care:  Because of underlying paranoia and refusal to go back to the personal care home and inability to care for herself on her own  Acceptance by patient:  Accepting  Gladys Vincent in recovery:  About living in another personal care home once she feels better  Understanding of medications :  Has some understanding  Involved in reintegration process:  Adjusting to the unit  Trusting in relatoinship with psychiatrist:  Trusting    Recommended Treatment:    Medication changes:  1) none today  Non-pharmacological treatments  1) continue individual therapy group therapy, milieu therapy and occupational therapy and milieu therapy involving multidisciplinary team approach with psychotherapist, case management, nursing, peer support services, art therapist etc using recovery principles and psycho-education about accepting illness and need for treatment   3) encouraged to start attending groups again  Safety  1) Safety/communication plan established targeting dynamic risk factors above  Discharge Plan most likely back to the a:  Personal care boarding home with act services once her delusions are managed and mood becomes more stabilized and she becomes more receptive to treatment compliance    Counseling / Coordination of Care    Total floor / unit time spent today 15 minutes  Greater than 50% of total time was spent with the patient and / or family counseling and / or coordination of care  A description of the counseling / coordination of care  Patient's Rights, confidentiality and exceptions to confidentiality, use of automated medical record, [address] [place] [address] staff access to medical record, and consent to treatment reviewed      Felisa Florence MD

## 2019-08-30 NOTE — PLAN OF CARE
Problem: Risk for Self Injury/Neglect  Goal: Attend and participate in unit activities, including therapeutic, recreational, and educational groups  Description  Interventions:  - Provide therapeutic and educational activities daily, encourage attendance and participation, and document same in the medical record  - Obtain collateral information, encourage visitation and family involvement in care   Outcome: Not Progressing     Problem: Anxiety  Goal: Anxiety is at manageable level  Description  Interventions:  - Assess and monitor patient's anxiety level  - Monitor for signs and symptoms of anxiety both physical and emotional (heart palpitations, chest pain, shortness of breath, headaches, nausea, feeling jumpy, restlessness, irritable, apprehensive)  - Collaborate with interdisciplinary team and initiate plan and interventions as ordered  - [place] patient to unit/surroundings  - Explain treatment plan  - Encourage participation in care  - Encourage verbalization of concerns/fears  - Identify coping mechanisms  - Assist in developing anxiety-reducing skills  - Administer/offer alternative therapies  - Limit or eliminate stimulants  Outcome: Not Progressing    Individual has been keeping to self in room, visible in milieu at meal times and for medication passes  She has not actively participated in programming, no groups were attended  After lunch she approached staff reporting feeling anxious  Writer spent time with her in 1:1 interaction  She is preoccupied wit blood work that was obtained this Am  RN explained that she would be aware if there is an issues, " no news is good news"  RN pointed out that she has been spending too much time in her room, increasing the probability to focus on negative thinking  She is very resistive to staff suggestions, comes up with excuses for spending most of the day in room  She did verbalize feeling calmer after the conversation  Complaint with medications    Will continue to monitor

## 2019-08-30 NOTE — PLAN OF CARE
Problem: Alteration in Thoughts and Perception  Goal: Complete daily ADLs, including personal hygiene independently, as able  Description  Interventions:  - Observe, teach, and assist patient with ADLS  - Monitor and promote a balance of rest/activity, with adequate nutrition and elimination   Outcome: Progressing     Problem: Risk for Self Injury/Neglect  Goal: Refrain from harming self  Description  Interventions:  - Monitor patient closely, per order  - Develop a trusting relationship  - Supervise medication ingestion, monitor effects and side effects   Outcome: Progressing     Problem: Alteration in Orientation  Goal: Interact with staff daily  Description  Interventions:  - Assess and re-assess patient's level of orientation  - Engage patient in 1 on 1 interactions, daily, for a minimum of 15 minutes   - Establish rapport/trust with patient   Outcome: Progressing     Problem: SAFETY ADULT  Goal: Patient will remain free of falls  Description  INTERVENTIONS:  - Assess patient frequently for physical needs  -  Identify cognitive and physical deficits and behaviors that affect risk of falls    -  [place] fall precautions as indicated by assessment   - Educate patient/family on patient safety including physical limitations  - Instruct patient to call for assistance with activity based on assessment  - Modify environment to reduce risk of injury  - Consider OT/PT consult to assist with strengthening/mobility  Outcome: Progressing     Problem: RESPIRATORY - ADULT  Goal: Achieves optimal ventilation and oxygenation  Description  INTERVENTIONS:  - Assess for changes in respiratory status  - Assess for changes in mentation and behavior  - Position to facilitate oxygenation and minimize respiratory effort  - Oxygen administration by appropriate delivery method based on oxygen saturation (per order) or ABGs  - Initiate smoking cessation education as indicated  - Encourage broncho-pulmonary hygiene including cough, deep breathe, Incentive Spirometry  - Assess the need for suctioning and aspirate as needed  - Assess and instruct to report SOB or any respiratory difficulty  - Respiratory Therapy support as indicated  Outcome: Progressing     Problem: Alteration in Thoughts and Perception  Goal: Attend and participate in unit activities, including therapeutic, recreational, and educational groups  Description  Interventions:  - Provide therapeutic and educational activities daily, encourage attendance and participation, and document same in the medical record   Outcome: Not Progressing   Radu Helms was isolative to her room most of the shift  Came out for meal and medication  Stated that she has anxiety, but she did not want prn medication  Good eye contact  Social with select peers  Took medication due at 1600 at 1720  "I always take it after my meal " ate 100% of her meal  No BM or shower this shift  Naps off and on throughout the shift  Did not attend groups this evening  Ate snack  Took HS medication  Oxygen saturation prior to O2 being applied 92%  Oxygen 1 liter via nasal cannula  Continue to monitor  Precautions maintained

## 2019-08-31 LAB
EOSINOPHIL # BLD AUTO: 0.14 THOUSAND/UL (ref 0–0.4)
EOSINOPHIL NFR BLD MANUAL: 2 % (ref 0–6)
ERYTHROCYTE [DISTWIDTH] IN BLOOD BY AUTOMATED COUNT: 14.7 %
HCT VFR BLD AUTO: 37.5 % (ref 36–46)
HGB BLD-MCNC: 12.6 G/DL (ref 12–16)
LYMPHOCYTES # BLD AUTO: 2.02 THOUSAND/UL (ref 0.5–4)
LYMPHOCYTES # BLD AUTO: 28 % (ref 25–45)
MCH RBC QN AUTO: 30.7 PG (ref 26–34)
MCHC RBC AUTO-ENTMCNC: 33.5 G/DL (ref 31–36)
MCV RBC AUTO: 92 FL (ref 80–100)
MONOCYTES # BLD AUTO: 1.08 THOUSAND/UL (ref 0.2–0.9)
MONOCYTES NFR BLD AUTO: 15 % (ref 1–10)
NEUTS BAND NFR BLD MANUAL: 1 % (ref 0–8)
NEUTS SEG # BLD: 3.82 THOUSAND/UL (ref 1.8–7.8)
NEUTS SEG NFR BLD AUTO: 52 %
PLATELET # BLD AUTO: 280 THOUSANDS/UL (ref 150–450)
PLATELET BLD QL SMEAR: ADEQUATE
PMV BLD AUTO: 8.4 FL (ref 8.9–12.7)
RBC # BLD AUTO: 4.09 MILLION/UL (ref 4–5.2)
RBC MORPH BLD: NORMAL
TOTAL CELLS COUNTED SPEC: 100
VARIANT LYMPHS # BLD AUTO: 2 % (ref 0–0)
WBC # BLD AUTO: 7.2 THOUSAND/UL (ref 4.5–11)

## 2019-08-31 PROCEDURE — 85027 COMPLETE CBC AUTOMATED: CPT | Performed by: PSYCHIATRY & NEUROLOGY

## 2019-08-31 PROCEDURE — 99232 SBSQ HOSP IP/OBS MODERATE 35: CPT | Performed by: NURSE PRACTITIONER

## 2019-08-31 PROCEDURE — 85007 BL SMEAR W/DIFF WBC COUNT: CPT | Performed by: PSYCHIATRY & NEUROLOGY

## 2019-08-31 RX ADMIN — FLUTICASONE FUROATE AND VILANTEROL TRIFENATATE 1 PUFF: 200; 25 POWDER RESPIRATORY (INHALATION) at 08:52

## 2019-08-31 RX ADMIN — CLOZAPINE 50 MG: 25 TABLET ORAL at 08:50

## 2019-08-31 RX ADMIN — CLOZAPINE 50 MG: 25 TABLET ORAL at 17:44

## 2019-08-31 RX ADMIN — THEOPHYLLINE ANHYDROUS 200 MG: 200 CAPSULE, EXTENDED RELEASE ORAL at 08:50

## 2019-08-31 RX ADMIN — MONTELUKAST SODIUM 10 MG: 10 TABLET, COATED ORAL at 21:24

## 2019-08-31 RX ADMIN — TIOTROPIUM BROMIDE 18 MCG: 18 CAPSULE ORAL; RESPIRATORY (INHALATION) at 08:52

## 2019-08-31 RX ADMIN — PANTOPRAZOLE SODIUM 40 MG: 40 TABLET, DELAYED RELEASE ORAL at 05:29

## 2019-08-31 RX ADMIN — FLUOXETINE 10 MG: 10 CAPSULE ORAL at 08:50

## 2019-08-31 RX ADMIN — LEVOTHYROXINE SODIUM 125 MCG: 125 TABLET ORAL at 05:29

## 2019-08-31 RX ADMIN — CLOZAPINE 50 MG: 25 TABLET ORAL at 21:24

## 2019-08-31 NOTE — PLAN OF CARE
Problem: Alteration in Thoughts and Perception  Goal: Complete daily ADLs, including personal hygiene independently, as able  Description  Interventions:  - Observe, teach, and assist patient with ADLS  - Monitor and promote a balance of rest/activity, with adequate nutrition and elimination   Outcome: Not Progressing  chart review indicates Palmira Jaspreet Is demanding and her higiene continues to be poor    Received pt in bed at johnson of shift with eyes closed; chest movement noted  Continues the same thus this far as per q 15 min room checks

## 2019-08-31 NOTE — PROGRESS NOTES
Progress Note - Behavioral Health   Annamarie Eng 60 y o  female MRN: [id number]  Unit/Bed#: [facility] 110-02 Encounter: [id number]    The patient was seen for continuing care and reviewed with treatment team  Staff reports that the patient remains cooperative with care, compliant with medications, and self isolating to room  Minimally social with peers  Attends select groups  During assessment the patient remains somatic and focused on "health concerns and blood work " Discussed that it was a holiday and blood work results would not be back at this time  Denies any thoughts to hurt herself or others  Denies any psychotic symptoms  Continues to appear paranoid and suspicious of others  Denies any adverse side effects to medications      Mental Status Evaluation:  Appearance:  Marginal/poor hygiene   Behavior:  cooperative, guarded and Superficial   Fund of knowledge  aware of current events and Aware of past history   Speech:   Language: Normal rate and Normal volume  No overt abnormality   Mood:  improving   Affect:   Associations: blunted  intact   Thought Process:  Circumstantial   Thought Content:  Paranoid and mistrustful and Obsessive ruminations   Perceptual Disturbances: Denies hallucinations and does not appear to be responding to internal stimuli   Risk Potential: No suicidal or homicidal ideation   Orientation  Oriented x 3   Memory No deficits   Attention/Concentration attention span appeared shorter than expected for age   Insight:  limited   Judgment: Limited   Gait/Station: normal gait/station and normal balance   Motor Activity: No abnormal movement noted     Progress Toward Goals: improving    Assessment/Plan    Principal Problem:    [facility]  Active Problems:    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[facility] )    Acquired hypothyroidism    Gastroesophageal reflux disease without esophagitis      Recommended Treatment: Continue with pharmacotherapy, group therapy, milieu therapy and occupational therapy  The patient will be maintained on the following medications:    Current Facility-Administered Medications:  acetaminophen 650 mg Oral Q6H PRN Zander Yanez MD   albuterol 2 puff Inhalation Q4H PRN Zander Yanez MD   aluminum-magnesium hydroxide-simethicone 15 mL Oral Q4H PRN Zander Yanez MD   ammonium lactate 1 application Topical BID PRN Zander Yanez MD   benzonatate 100 mg Oral TID PRN Zander Yanez MD   benztropine 1 mg Intramuscular Q8H PRN Zander Yanez MD   cloZAPine 50 mg Oral TID Zander Yanez MD   EPINEPHrine PF 0 15 mg Intramuscular Once PRN Zander Yanez MD   FLUoxetine 10 mg Oral Daily Zander Yanez MD   fluticasone-vilanterol 1 puff Inhalation Daily Zanedr Yanez MD   ketotifen 1 drop Right Eye BID PRN Zander Yanez MD   levothyroxine 125 mcg Oral Early Morning Zander Yanez MD   magnesium hydroxide 30 mL Oral Daily PRN Zander Yanez MD   montelukast 10 mg Oral HS Zander Yanez MD   OLANZapine 5 mg Intramuscular Q8H PRN Zander Yanez MD   OLANZapine 5 mg Oral Q8H PRN Zander Yanez MD   pantoprazole 40 mg Oral Early Morning Zander Yanez MD   polyethylene glycol 17 g Oral Daily PRN Zander Yanez MD   polyvinyl alcohol 1 drop Both Eyes Q3H PRN Zander Yanez MD   theophylline 200 mg Oral Daily Zander Yanez MD   tiotropium 18 mcg Inhalation Daily Zander Yanez MD   traZODone 25 mg Oral HS PRN Zander Yanez MD       Risks, benefits and possible side effects of Medications:   Risks, benefits, and possible side effects of medications explained to patient and patient verbalizes understanding

## 2019-08-31 NOTE — PROGRESS NOTES
Pt received @ 0700  Appears sad but pleasant & cooperative  Ate 75% of breakfast & 25% lunch  Did not attend any groups  Withdrawn, isolative to room/self   No behavioral issues, will continue to monitor

## 2019-08-31 NOTE — PLAN OF CARE
Problem: Alteration in Thoughts and Perception  Goal: Agree to be compliant with medication regime, as prescribed and report medication side effects  Description  Interventions:  - Offer appropriate PRN medication and supervise ingestion; conduct aims, as needed   Outcome: Progressing     Problem: RESPIRATORY - ADULT  Goal: Achieves optimal ventilation and oxygenation  Description  INTERVENTIONS:  - Assess for changes in respiratory status  - Assess for changes in mentation and behavior  - Position to facilitate oxygenation and minimize respiratory effort  - Oxygen administration by appropriate delivery method based on oxygen saturation (per order) or ABGs  - Initiate smoking cessation education as indicated  - Encourage broncho-pulmonary hygiene including cough, deep breathe, Incentive Spirometry  - Assess the need for suctioning and aspirate as needed  - Assess and instruct to report SOB or any respiratory difficulty  - Respiratory Therapy support as indicated  Outcome: Progressing     Problem: Alteration in Thoughts and Perception  Goal: Attend and participate in unit activities, including therapeutic, recreational, and educational groups  Description  Interventions:  - Provide therapeutic and educational activities daily, encourage attendance and participation, and document same in the medical record     CERTIFIED PEER SPECIALIST INTERVENTIONS:    Complete peer assessment with patient to assess their needs and identify their goals to complete while in the recovery program as well as once discharged into the community  Patient will complete WRAP Plan, Crisis Plan and [facility] Domains  Patient will attend 50% of groups offered on the unit  Patient will complete a goal card weekly      Outcome: Not Progressing  Goal: Complete daily ADLs, including personal hygiene independently, as able  Description  Interventions:  - Observe, teach, and assist patient with ADLS  - Monitor and promote a balance of rest/activity, with adequate nutrition and elimination   Outcome: Not Progressing     Problem: Ineffective Coping  Goal: Demonstrates healthy coping skills  Outcome: Not Progressing     Problem: Depression  Goal: Refrain from isolation  Description  Interventions:  - Develop a trusting relationship   - Encourage socialization   Outcome: Not Progressing     2145 Ilda White continues to isolate in her room & bed, coming out only for scheduled medicines, to eat meal (had only fluids & 1/2 sandwich), to have HS snack  She does do the Elias Borges [place] w/volumes averaging 1000ml, 5-6 breaths each time  She has not attended to any hygiene tasks, no mouth care, no laps, no PM Group  Was worried she would experience anaphylactic shock because her cookie snack bag said 'may contain nuts' after she'd eaten it  Was reassured it would have happened quickly if was going to occur & doctor has epinephrine ordered if needed  Presented that the note is cautionary on bags as sometimes foods prepared on same machinery as nuts, plus, should always read the packaging BEFORE eating  Agreeable  Is pleasant

## 2019-09-01 PROCEDURE — 99232 SBSQ HOSP IP/OBS MODERATE 35: CPT | Performed by: NURSE PRACTITIONER

## 2019-09-01 RX ADMIN — THEOPHYLLINE ANHYDROUS 200 MG: 200 CAPSULE, EXTENDED RELEASE ORAL at 08:35

## 2019-09-01 RX ADMIN — PANTOPRAZOLE SODIUM 40 MG: 40 TABLET, DELAYED RELEASE ORAL at 06:12

## 2019-09-01 RX ADMIN — CLOZAPINE 50 MG: 25 TABLET ORAL at 08:35

## 2019-09-01 RX ADMIN — CLOZAPINE 50 MG: 25 TABLET ORAL at 21:35

## 2019-09-01 RX ADMIN — MONTELUKAST SODIUM 10 MG: 10 TABLET, COATED ORAL at 21:35

## 2019-09-01 RX ADMIN — FLUTICASONE FUROATE AND VILANTEROL TRIFENATATE 1 PUFF: 200; 25 POWDER RESPIRATORY (INHALATION) at 09:31

## 2019-09-01 RX ADMIN — TIOTROPIUM BROMIDE 18 MCG: 18 CAPSULE ORAL; RESPIRATORY (INHALATION) at 09:31

## 2019-09-01 RX ADMIN — FLUOXETINE 10 MG: 10 CAPSULE ORAL at 08:35

## 2019-09-01 RX ADMIN — CLOZAPINE 50 MG: 25 TABLET ORAL at 17:54

## 2019-09-01 RX ADMIN — LEVOTHYROXINE SODIUM 125 MCG: 125 TABLET ORAL at 06:12

## 2019-09-01 NOTE — PLAN OF CARE
Problem: Risk for Self Injury/Neglect  Goal: Complete daily ADLs, including personal hygiene independently, as able  Description  Interventions:  - Observe, teach, and assist patient with ADLS  - Monitor and promote a balance of rest/activity, with adequate nutrition and elimination  Outcome: Not Progressing as per chart review    Received pt in bed at change of shift with eyes closed; chest movement noted  Continues the same thus this far as per q 15 min room checks  Will continue to monitor

## 2019-09-01 NOTE — PLAN OF CARE
Problem: Depression  Goal: Refrain from isolation  Description  Interventions:  - Develop a trusting relationship   - Encourage socialization   Outcome: Not Progressing  Goal: Refrain from self-neglect  Outcome: Not Progressing     Patient isolative to room other than meds and meals  Pt ate 100% of breakfast and 100% of lunch  Pt refused both groups  Compliant with meds with some prompting  No issues throughout shift  Will continue to monitor

## 2019-09-01 NOTE — PLAN OF CARE
Problem: Alteration in Thoughts and Perception  Goal: Agree to be compliant with medication regime, as prescribed and report medication side effects  Description  Interventions:  - Offer appropriate PRN medication and supervise ingestion; conduct aims, as needed   Outcome: Progressing     Problem: RESPIRATORY - ADULT  Goal: Achieves optimal ventilation and oxygenation  Description  INTERVENTIONS:  - Assess for changes in respiratory status  - Assess for changes in mentation and behavior  - Position to facilitate oxygenation and minimize respiratory effort  - Oxygen administration by appropriate delivery method based on oxygen saturation (per order) or ABGs  - Initiate smoking cessation education as indicated  - Encourage broncho-pulmonary hygiene including cough, deep breathe, Incentive Spirometry  - Assess the need for suctioning and aspirate as needed  - Assess and instruct to report SOB or any respiratory difficulty  - Respiratory Therapy support as indicated  Outcome: Progressing     Problem: Alteration in Thoughts and Perception  Goal: Attend and participate in unit activities, including therapeutic, recreational, and educational groups  Description  Interventions:  - Provide therapeutic and educational activities daily, encourage attendance and participation, and document same in the medical record     CERTIFIED PEER SPECIALIST INTERVENTIONS:    Complete peer assessment with patient to assess their needs and identify their goals to complete while in the recovery program as well as once discharged into the community  Patient will complete WRAP Plan, Crisis Plan and 5 Life Domains  Patient will attend 50% of groups offered on the unit  Patient will complete a goal card weekly      Outcome: Not Progressing  Goal: Complete daily ADLs, including personal hygiene independently, as able  Description  Interventions:  - Observe, teach, and assist patient with ADLS  - Monitor and promote a balance of rest/activity, with adequate nutrition and elimination   Outcome: Not Progressing     Problem: Ineffective Coping  Goal: Demonstrates healthy coping skills  Outcome: Not Progressing     Problem: Depression  Goal: Refrain from isolation  Description  Interventions:  - Develop a trusting relationship   - Encourage socialization   Outcome: Not Progressing     [address] Jaspreet has been isolative to her room & bed, coming out only to eat 75% of meal, for scheduled medicine, to make a phone call, to have HS snack  Has not attended to combing hair, scrubbing teeth, doing any laps  Has used the Incentive Spirometer, achieving volumes of 900-1000ml, 5-6 breaths each time  Did not take opportunity to go outside w/peers to deck for fresh air, did not attend PM Group  Is wearing her QHS nasal O2 @ 1L for bedtime  She is pleasant, but, socially withdrawn

## 2019-09-01 NOTE — PROGRESS NOTES
Progress Note - Behavioral Health   Roselia Woody 60 y o  female MRN: [id number]  Unit/Bed#: [facility] 110-02 Encounter: [id number]    The patient was seen for continuing care and reviewed with treatment team Staff reports that the patient remains self isolating to room, compliant with medications, and cooperative with care  Attending select groups  During assessment the patient remains somatic and focused on her blood work results  Reports increased fatigue and inability to shower due to "lack of activity " Patient continues to remains delusional, paranoid, and circumstantial in thought  Denies any thoughts to hurt herself or others  Denies any auditory or visual hallucinations       Mental Status Evaluation:  Appearance:  Marginal/poor hygiene   Behavior:  cooperative, guarded, Superficial and Dismissive   Fund of knowledge  aware of current events and Aware of past history   Speech:   Language: Normal rate and Normal volume  No overt abnormality   Mood:  stable   Affect:   Associations: constricted  Intact   Thought Process:  Circumstantial   Thought Content:  Paranoid and mistrustful, Delusions of persecution, Grandiose delusions, Obsessive ruminations and Somatic delusions   Perceptual Disturbances: Denies hallucinations and does not appear to be responding to internal stimuli   Risk Potential: No suicidal or homicidal ideation   Orientation  Oriented x 3   Memory No deficits   Attention/Concentration attention span appeared shorter than expected for age   Insight:  No insight   Judgment: Poor judgment   Gait/Station: Not observed   Motor Activity: No abnormal movement noted     Progress Toward Goals: unchanged    Assessment/Plan    Principal Problem:    Schizoaffective disorder, bipolar type ([facility] [address] )  Active Problems:    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    Acquired hypothyroidism    Gastroesophageal reflux disease without esophagitis      Recommended Treatment: Continue with pharmacotherapy, group therapy, milieu therapy and occupational therapy  The patient will be maintained on the following medications:    Current Facility-Administered Medications:  acetaminophen 650 mg Oral Q6H PRN Greer Beltran MD   albuterol 2 puff Inhalation Q4H PRN Greer Beltran MD   aluminum-magnesium hydroxide-simethicone 15 mL Oral Q4H PRN Greer Beltran MD   ammonium lactate 1 application Topical BID PRN Greer Beltran MD   benzonatate 100 mg Oral TID PRN Greer Beltran MD   benztropine 1 mg Intramuscular Q8H PRN Greer Beltran MD   cloZAPine 50 mg Oral TID Greer Beltran MD   EPINEPHrine PF 0 15 mg Intramuscular Once PRN Greer Beltran MD   FLUoxetine 10 mg Oral Daily Greer Beltran MD   fluticasone-vilanterol 1 puff Inhalation Daily Greer Beltran MD   ketotifen 1 drop Right Eye BID PRN Greer Beltran MD   levothyroxine 125 mcg Oral Early Morning Greer Beltran MD   magnesium hydroxide 30 mL Oral Daily PRN Greer Beltran MD   montelukast 10 mg Oral HS Greer Beltran MD   OLANZapine 5 mg Intramuscular Q8H PRN Greer Beltran MD   OLANZapine 5 mg Oral Q8H PRN Greer Beltran MD   pantoprazole 40 mg Oral Early Morning Greer Beltran MD   polyethylene glycol 17 g Oral Daily PRN Greer Beltran MD   polyvinyl alcohol 1 drop Both Eyes Q3H PRN Greer Beltran MD   theophylline 200 mg Oral Daily Greer Beltran MD   tiotropium 18 mcg Inhalation Daily Greer Beltran MD   traZODone 25 mg Oral HS PRN Greer Beltran MD       Risks, benefits and possible side effects of Medications:   Risks, benefits, and possible side effects of medications explained to patient and patient verbalizes understanding

## 2019-09-02 PROCEDURE — 99231 SBSQ HOSP IP/OBS SF/LOW 25: CPT | Performed by: NURSE PRACTITIONER

## 2019-09-02 RX ADMIN — CLOZAPINE 50 MG: 25 TABLET ORAL at 21:30

## 2019-09-02 RX ADMIN — CLOZAPINE 50 MG: 25 TABLET ORAL at 17:44

## 2019-09-02 RX ADMIN — LEVOTHYROXINE SODIUM 125 MCG: 125 TABLET ORAL at 05:38

## 2019-09-02 RX ADMIN — FLUOXETINE 10 MG: 10 CAPSULE ORAL at 08:32

## 2019-09-02 RX ADMIN — PANTOPRAZOLE SODIUM 40 MG: 40 TABLET, DELAYED RELEASE ORAL at 05:38

## 2019-09-02 RX ADMIN — FLUTICASONE FUROATE AND VILANTEROL TRIFENATATE 1 PUFF: 200; 25 POWDER RESPIRATORY (INHALATION) at 08:35

## 2019-09-02 RX ADMIN — THEOPHYLLINE ANHYDROUS 200 MG: 200 CAPSULE, EXTENDED RELEASE ORAL at 08:32

## 2019-09-02 RX ADMIN — MONTELUKAST SODIUM 10 MG: 10 TABLET, COATED ORAL at 21:30

## 2019-09-02 RX ADMIN — CLOZAPINE 50 MG: 25 TABLET ORAL at 08:32

## 2019-09-02 RX ADMIN — TIOTROPIUM BROMIDE 18 MCG: 18 CAPSULE ORAL; RESPIRATORY (INHALATION) at 08:34

## 2019-09-02 NOTE — PLAN OF CARE
Problem: PAIN - ADULT  Goal: Verbalizes/displays adequate comfort level or baseline comfort level  Description  Interventions:  - Encourage patient to monitor pain and request assistance  - Assess pain using appropriate pain scale  - Administer analgesics based on type and severity of pain and evaluate response  - Implement non-pharmacological measures as appropriate and evaluate response  - Consider cultural and social influences on pain and pain management  - Notify physician/advanced practitioner if interventions unsuccessful or patient reports new pain  Outcome: Progressing     Problem: SAFETY ADULT  Goal: Patient will remain free of falls  Description  INTERVENTIONS:  - Assess patient frequently for physical needs  -  Identify cognitive and physical deficits and behaviors that affect risk of falls    -  Hayes fall precautions as indicated by assessment   - Educate patient/family on patient safety including physical limitations  - Instruct patient to call for assistance with activity based on assessment  - Modify environment to reduce risk of injury  - Consider OT/PT consult to assist with strengthening/mobility  Outcome: Progressing     Problem: RESPIRATORY - ADULT  Goal: Achieves optimal ventilation and oxygenation  Description  INTERVENTIONS:  - Assess for changes in respiratory status  - Assess for changes in mentation and behavior  - Position to facilitate oxygenation and minimize respiratory effort  - Oxygen administration by appropriate delivery method based on oxygen saturation (per order) or ABGs  - Initiate smoking cessation education as indicated  - Encourage broncho-pulmonary hygiene including cough, deep breathe, Incentive Spirometry  - Assess the need for suctioning and aspirate as needed  - Assess and instruct to report SOB or any respiratory difficulty  - Respiratory Therapy support as indicated  Outcome: Nnia Blum maintained on ongoing fall and SAFE precaution without incident on this shift   Laying in bed with her eyes closed, breath even and unlabored with o2 @1L/m with the humidification   No sign or symptom of respiratory distress noted  Denny Tobin continues to remain free from fall   Denies any pain or discomfort   Thus far she is in stabled condition   Q 15 minutes checks during the night continues  No behavioral noted    Will continue to monitor behavioral, respiratory, and sleep pattern

## 2019-09-02 NOTE — PLAN OF CARE
Problem: Alteration in Thoughts and Perception  Goal: Agree to be compliant with medication regime, as prescribed and report medication side effects  Description  Interventions:  - Offer appropriate PRN medication and supervise ingestion; conduct aims, as needed   Outcome: Progressing     Problem: RESPIRATORY - ADULT  Goal: Achieves optimal ventilation and oxygenation  Description  INTERVENTIONS:  - Assess for changes in respiratory status  - Assess for changes in mentation and behavior  - Position to facilitate oxygenation and minimize respiratory effort  - Oxygen administration by appropriate delivery method based on oxygen saturation (per order) or ABGs  - Initiate smoking cessation education as indicated  - Encourage broncho-pulmonary hygiene including cough, deep breathe, Incentive Spirometry  - Assess the need for suctioning and aspirate as needed  - Assess and instruct to report SOB or any respiratory difficulty  - Respiratory Therapy support as indicated  Outcome: Progressing     Problem: Alteration in Thoughts and Perception  Goal: Complete daily ADLs, including personal hygiene independently, as able  Description  Interventions:  - Observe, teach, and assist patient with ADLS  - Monitor and promote a balance of rest/activity, with adequate nutrition and elimination   Outcome: Not Progressing     Problem: Ineffective Coping  Goal: Demonstrates healthy coping skills  Outcome: Not Progressing     Problem: Depression  Goal: Refrain from isolation  Description  Interventions:  - Develop a trusting relationship   - Encourage socialization   Outcome: Not Progressing     [address] Marroquin was visited by her youngest brother w/warm interactions between them  She speaks fondly of her siblings  Willing to do the Incentive Spirometry & continues to show improvement in quality of breaths & volume (achieving 1000-1100ml w/each breath)   But, has not scrubbed teeth, combed out hair today, done shower, nor has she taken any laps outside of necessity  Did eat 75% of meal, came to window for supper medicine  Is pleasant, but, isolates in bed in free time

## 2019-09-02 NOTE — PROGRESS NOTES
Progress Note - Behavioral Health   Homero Greycliff 60 y o  female MRN: [id number]  Unit/Bed#: [facility] 355-18 Encounter: [id number]  Patient was seen for continuing care and treatment  Case was discussed with the nursing staff  On examination of the patient seen lying in her bed  She is offering no complaints  She is reporting no new issues or concerns as well as the staff  Continue current med per Dr Mike priest        Behavior over the last 24 hours:  unchanged  Sleep: normal  Appetite: normal  Medication side effects: No  ROS: no complaints    Medications:   Current Facility-Administered Medications   Medication Dose Route Frequency    acetaminophen (TYLENOL) tablet 650 mg  650 mg Oral Q6H PRN    albuterol (PROVENTIL HFA,VENTOLIN HFA) inhaler 2 puff  2 puff Inhalation Q4H PRN    aluminum-magnesium hydroxide-simethicone (MYLANTA) 200-200-20 mg/5 mL oral suspension 15 mL  15 mL Oral Q4H PRN    ammonium lactate (LAC-HYDRIN) 12 % lotion 1 application  1 application Topical BID PRN    benzonatate (TESSALON PERLES) capsule 100 mg  100 mg Oral TID PRN    benztropine (COGENTIN) injection 1 mg  1 mg Intramuscular Q8H PRN    cloZAPine (CLOZARIL) tablet 50 mg  50 mg Oral TID    EPINEPHrine PF (ADRENALIN) 1 mg/mL injection 0 15 mg  0 15 mg Intramuscular Once PRN    FLUoxetine (PROzac) capsule 10 mg  10 mg Oral Daily    fluticasone-vilanterol (BREO ELLIPTA) 200-25 MCG/INH inhaler 1 puff  1 puff Inhalation Daily    ketotifen (ZADITOR) 0 025 % ophthalmic solution 1 drop  1 drop Right Eye BID PRN    levothyroxine tablet 125 mcg  125 mcg Oral Early Morning    magnesium hydroxide (MILK OF MAGNESIA) 400 mg/5 mL oral suspension 30 mL  30 mL Oral Daily PRN    montelukast (SINGULAIR) tablet 10 mg  10 mg Oral HS    OLANZapine (ZyPREXA) IM injection 5 mg  5 mg Intramuscular Q8H PRN    OLANZapine (ZyPREXA) tablet 5 mg  5 mg Oral Q8H PRN    pantoprazole (PROTONIX) EC tablet 40 mg  40 mg Oral Early Morning    polyethylene glycol (MIRALAX) packet 17 g  17 g Oral Daily PRN    polyvinyl alcohol (LIQUIFILM TEARS) 1 4 % ophthalmic solution 1 drop  1 drop Both Eyes Q3H PRN    theophylline (JEF-24) 24 hr capsule 200 mg  200 mg Oral Daily    tiotropium (SPIRIVA) capsule for inhaler 18 mcg  18 mcg Inhalation Daily    traZODone (DESYREL) tablet 25 mg  25 mg Oral HS PRN       Labs:   No results displayed because visit has over 200 results  Mental Status Evaluation:  Appearance:  casually dressed and disheveled   Behavior:  evasive and guarded   Speech:  normal volume   Mood:  euthymic   Affect:  constricted   Thought Process:  circumstantial   Thought Content:  delusions  obsessive/rumination and obsessions   Perceptual Disturbances: None   Risk Potential: none   Sensorium:  person and place   Cognition:  grossly intact   Consciousness:  alert and awake    Attention: attention span appeared shorter than expected for age   Insight:  limited   Judgment: limited   Gait/Station: normal gait/station   Motor Activity: no abnormal movements     Progress Toward Goals: No chabnge  Assessment/Plan   Principal Problem:    Schizoaffective disorder, bipolar type (HCC)  Active Problems:    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[facility] [address] )    Acquired hypothyroidism    Gastroesophageal reflux disease without esophagitis      Recommended Treatment: Continue with group therapy, milieu therapy and occupational therapy  Risks, benefits and possible side effects of Medications:   Risks, benefits, and possible side effects of medications explained to patient and patient verbalizes understanding  Counseling / Coordination of Care  Total floor / unit time spent today 15 minutes  Greater than 50% of total time was spent with the patient and / or family counseling and / or coordination of care   A description of the counseling / coordination of care: 10

## 2019-09-02 NOTE — PLAN OF CARE
Problem: Alteration in Thoughts and Perception  Goal: Agree to be compliant with medication regime, as prescribed and report medication side effects  Description  Interventions:  - Offer appropriate PRN medication and supervise ingestion; conduct aims, as needed   9/1/2019 2235 by Red Cruz RN  Outcome: Progressing  9/1/2019 2235 by Red Cruz RN  Outcome: Progressing  Goal: Complete daily ADLs, including personal hygiene independently, as able  Description  Interventions:  - Observe, teach, and assist patient with ADLS  - Monitor and promote a balance of rest/activity, with adequate nutrition and elimination   9/1/2019 2235 by Red Cruz RN  Outcome: Progressing  9/1/2019 2235 by Red Cruz RN  Outcome: Progressing     Problem: RESPIRATORY - ADULT  Goal: Achieves optimal ventilation and oxygenation  Description  INTERVENTIONS:  - Assess for changes in respiratory status  - Assess for changes in mentation and behavior  - Position to facilitate oxygenation and minimize respiratory effort  - Oxygen administration by appropriate delivery method based on oxygen saturation (per order) or ABGs  - Initiate smoking cessation education as indicated  - Encourage broncho-pulmonary hygiene including cough, deep breathe, Incentive Spirometry  - Assess the need for suctioning and aspirate as needed  - Assess and instruct to report SOB or any respiratory difficulty  - Respiratory Therapy support as indicated  Outcome: Progressing     Problem: Alteration in Thoughts and Perception  Goal: Attend and participate in unit activities, including therapeutic, recreational, and educational groups  Description  Interventions:  - Provide therapeutic and educational activities daily, encourage attendance and participation, and document same in the medical record     CERTIFIED PEER SPECIALIST INTERVENTIONS:    Complete peer assessment with patient to assess their needs and identify their goals to complete while in the recovery program as well as once discharged into the community  Patient will complete WRAP Plan, Crisis Plan and 5 Life Domains  Patient will attend 50% of groups offered on the unit  Patient will complete a goal card weekly  Outcome: Not Progressing     Problem: Depression  Goal: Refrain from isolation  Description  Interventions:  - Develop a trusting relationship   - Encourage socialization   9/1/2019 2235 by Mika Davis RN  Outcome: Not Progressing  9/1/2019 2235 by Mika Davis RN  Outcome: Not Progressing     2[address] Danie Steiner was visited by her sister w/warm interactions between them, enjoyed the food treats brought  Does her Incentive Spirometry [facility] [place] w/increasing proficiency, achieves nearly consistent volumes of 1000ml for 5-6 breaths each time  Was willing to brush out her hair when presented w/comb & brush; nurse assisted her to put it back up again & encouraged more frequent shampooing as scalp is flaky  Ate 100% of meal, came up for scheduled medicines, had HS snack  Did not attend PM Group, did not scrub teeth or do any [place] laps  Rather, continues to isolate in room in bed in free time  Is pleasant, will socialize w/roommate  Wearing her QHS humidified nasal O2 @ 1L for bedtime

## 2019-09-02 NOTE — NURSING NOTE
Patient visible in hallway at intervals  Pleasant upon approach  SOB with exertion  Utilizing oxygen during bedtime  Pulse ox within normal range on r/a  No distress observed in patient  Appetite good  Denies suicidal ideations  Utilized incentive spirometer as ordered  Encouraged to participate in mileu more  Uses walker to ambulate  No somatic complaints today

## 2019-09-03 LAB
GAMMA INTERFERON BACKGROUND BLD IA-ACNC: 0.03 IU/ML
M TB IFN-G BLD-IMP: NEGATIVE
M TB IFN-G CD4+ BCKGRND COR BLD-ACNC: 0 IU/ML
M TB IFN-G CD4+ BCKGRND COR BLD-ACNC: 0.01 IU/ML
MITOGEN IGNF BCKGRD COR BLD-ACNC: 5.9 IU/ML

## 2019-09-03 PROCEDURE — 99231 SBSQ HOSP IP/OBS SF/LOW 25: CPT | Performed by: PSYCHIATRY & NEUROLOGY

## 2019-09-03 RX ADMIN — CLOZAPINE 50 MG: 25 TABLET ORAL at 21:38

## 2019-09-03 RX ADMIN — MONTELUKAST SODIUM 10 MG: 10 TABLET, COATED ORAL at 21:38

## 2019-09-03 RX ADMIN — FLUTICASONE FUROATE AND VILANTEROL TRIFENATATE 1 PUFF: 200; 25 POWDER RESPIRATORY (INHALATION) at 08:48

## 2019-09-03 RX ADMIN — LEVOTHYROXINE SODIUM 125 MCG: 125 TABLET ORAL at 06:04

## 2019-09-03 RX ADMIN — PANTOPRAZOLE SODIUM 40 MG: 40 TABLET, DELAYED RELEASE ORAL at 06:04

## 2019-09-03 RX ADMIN — TIOTROPIUM BROMIDE 18 MCG: 18 CAPSULE ORAL; RESPIRATORY (INHALATION) at 08:47

## 2019-09-03 RX ADMIN — CLOZAPINE 50 MG: 25 TABLET ORAL at 17:00

## 2019-09-03 RX ADMIN — CLOZAPINE 50 MG: 25 TABLET ORAL at 08:46

## 2019-09-03 RX ADMIN — THEOPHYLLINE ANHYDROUS 200 MG: 200 CAPSULE, EXTENDED RELEASE ORAL at 08:46

## 2019-09-03 RX ADMIN — FLUOXETINE 10 MG: 10 CAPSULE ORAL at 08:46

## 2019-09-03 NOTE — PROGRESS NOTES
09/03/19 0800   Team Meeting   Meeting Type Daily Rounds   Team Members Present   Team Members Present Physician;Nurse; Other (Discipline and Name)   Physician Team Member Dr Ileana Bro Team Member Jeff Everett RN   Other (Discipline and Name) Rand Stark, [place]; SHANIKA Hylton     Patent's brother and sister visited over the weekend  Patient with no issues reported over the weekend

## 2019-09-03 NOTE — PLAN OF CARE
Problem: Alteration in Thoughts and Perception  Goal: Verbalize thoughts and feelings  Description  Interventions:  - Promote a nonjudgmental and trusting relationship with the patient through active listening and therapeutic communication  - Assess patient's level of functioning, behavior and potential for risk  - Engage patient in 1 on 1 interactions for a minimum of 15 minutes each session  - Encourage patient to express fears, feelings, frustrations, and discuss symptoms    - Spiro patient to reality, help patient recognize reality-based thinking   - Administer medications as ordered and assess for potential side effects  - Provide the patient education related to the signs and symptoms of the illness and desired effects of prescribed medications  Outcome: Progressing  Goal: Agree to be compliant with medication regime, as prescribed and report medication side effects  Description  Interventions:  - Offer appropriate PRN medication and supervise ingestion; conduct aims, as needed   Outcome: Progressing     Problem: Alteration in Thoughts and Perception  Goal: Complete daily ADLs, including personal hygiene independently, as able  Description  Interventions:  - Observe, teach, and assist patient with ADLS  - Monitor and promote a balance of rest/activity, with adequate nutrition and elimination   Outcome: Not Progressing     Problem: Ineffective Coping  Goal: Participates in unit activities  Description  Interventions:  - Provide therapeutic environment   - Provide required programming   - Redirect inappropriate behaviors   Outcome: Not Progressing    FALL Precautions maintained, isolative to room, out only for meals, and medications and snack, pleasant, cooperative, denied pain, gait steady, no distress noted, mild shortness of breath with exertion, pulse ox 93%, anxious, needy at times, utilized incentive spirometer, with encouragement from nursing staff

## 2019-09-03 NOTE — PROGRESS NOTES
2140 Maggie, @ 8622, was convinced the mouth piece to her Incentive Spirometer was "coated w/something toxic"  This nurse washed the mouth piece which satisfied her when next she used it  But, remains suspicious  Did not attend PM Group  Did come out for HS snack w/a very strong aroma of urine emanating from her, though, not wet  Wearing her QHS humidified nasal O2 @ 1L for bed now

## 2019-09-03 NOTE — ASSESSMENT & PLAN NOTE
Psychiatry Progress Note    Subjective: Interval History     Patient is again refusing to attend any groups claiming that she feels fatigued and states back in her room on her bed  She was accusing the staff of poisoning her spirometer  Her CBC is acceptable with decrease in white cell count from the increase from early last week  He is still refusing to attend any of the groups and preferring to lay back on bed claiming that she feels tired or feeling sec or cannot breathe despite breathing appropriately and having nasal oxygen on at night  She has been resistive to taking showers or braiding her hair and prefers to stay back on her bed most of the time  She continues to talking in a stilted manner as usual   She does not admit to any overt hallucinations or paranoia but still appears to harbor some covert  paranoia as she does not always trust certain nursing staff giving her the medications  She is still argumentative with nursing staff about certain medications and continues to believe that [employer] has peanut oil in attend that she cannot have it  She hardly attends any groups even though she knows that she needs to attend some groups to be able to get privileges despite repeated reminders  She  still believes  that it is 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 and this has not changed since she came to the unit  She has chosen not to to use the portable oxygen to get out of bed and go to groups if necessary  She the still guarded suspicious evasive and in denial of her illness which has not changed since admission  No current suicidal homicidal thoughts intent or plans reported  No overt hallucinations or other delusions reported   No signs or sxs of agranulocytosis or myocarditis or endocarditis and she is moving her bowels so far with no issues    Her brother and sister did visit with her over the weekend   Current medications:    Current Facility-Administered Medications:   acetaminophen (TYLENOL) tablet 650 mg, 650 mg, Oral, Q6H PRN, Zac Sierra MD    albuterol (PROVENTIL HFA,VENTOLIN HFA) inhaler 2 puff, 2 puff, Inhalation, Q4H PRN, Zac iSerra MD, 2 puff at 07/25/19 1200    aluminum-magnesium hydroxide-simethicone (MYLANTA) 200-200-20 mg/5 mL oral suspension 15 mL, 15 mL, Oral, Q4H PRN, Zac Sierra MD    ammonium lactate (LAC-HYDRIN) 12 % lotion 1 application, 1 application, Topical, BID PRN, Zac Sierra MD    benzonatate (TESSALON PERLES) capsule 100 mg, 100 mg, Oral, TID PRN, Zac Sierra MD    benztropine (COGENTIN) injection 1 mg, 1 mg, Intramuscular, Q8H PRN, Zac Sierra MD    cloZAPine (CLOZARIL) tablet 50 mg, 50 mg, Oral, TID, Zac Sierra MD, 50 mg at 09/03/19 0846    EPINEPHrine PF (ADRENALIN) 1 mg/mL injection 0 15 mg, 0 15 mg, Intramuscular, Once PRN, Zac Sierra MD    FLUoxetine (PROzac) capsule 10 mg, 10 mg, Oral, Daily, Zac Sierra MD, 10 mg at 09/03/19 0846    fluticasone-vilanterol (BREO ELLIPTA) 200-25 MCG/INH inhaler 1 puff, 1 puff, Inhalation, Daily, Zac Sierra MD, 1 puff at 09/03/19 0848    ketotifen (ZADITOR) 0 025 % ophthalmic solution 1 drop, 1 drop, Right Eye, BID PRN, Zac Sierra MD    levothyroxine tablet 125 mcg, 125 mcg, Oral, Early Morning, Zac Sierra MD, 125 mcg at 09/03/19 0604    magnesium hydroxide (MILK OF MAGNESIA) 400 mg/5 mL oral suspension 30 mL, 30 mL, Oral, Daily PRN, Zac Sierra MD    montelukast (SINGULAIR) tablet 10 mg, 10 mg, Oral, HS, Zac Sierra MD, 10 mg at 09/02/19 2130    OLANZapine (ZyPREXA) IM injection 5 mg, 5 mg, Intramuscular, Q8H PRN, Zac Sierra MD    OLANZapine (ZyPREXA) tablet 5 mg, 5 mg, Oral, Q8H PRN, Zac Sierra MD    pantoprazole (PROTONIX) EC tablet 40 mg, 40 mg, Oral, Early Morning, Zac Sierra MD, 40 mg at 09/03/19 0604    polyethylene glycol (MIRALAX) packet 17 g, 17 g, Oral, Daily PRN, Zac Sierra MD    polyvinyl alcohol (LIQUIFILM TEARS) 1 4 % ophthalmic solution 1 drop, 1 drop, Both Eyes, Q3H PRN, Sandhya Bolaños MD    theophylline (JEF-24) 24 hr capsule 200 mg, 200 mg, Oral, Daily, Sandhya Bolaños MD, 200 mg at 09/03/19 0846    tiotropium (SPIRIVA) capsule for inhaler 18 mcg, 18 mcg, Inhalation, Daily, Sandhya Bolaños MD, 18 mcg at 09/03/19 0847    traZODone (DESYREL) tablet 25 mg, 25 mg, Oral, HS PRN, Sandhya Bolaños MD  Justification if on more than two antipsychotics:  Only on his clozapine  Side effects if any:  None    Risks , benefits, side effects and precautions of medications discussed with patient and reminded patient to let us know any problems with medications     Objective:     Vital Signs:  Vitals:    09/02/19 1900 09/02/19 2135 09/03/19 0730 09/03/19 0732   BP: 110/71  132/83 122/73   BP Location: Left arm  Left arm Left arm   Pulse: 99  87 (!) 107   Resp: 17  18    Temp: (!) 97 1 °F (36 2 °C)  (!) 97 3 °F (36 3 °C)    TempSrc: Temporal  Temporal    SpO2: 95% 93%     Weight:       Height:         Appearance:  age appropriate, casually dressed and overweight older than stated age   Behavior:  deviant, evasive and guarded and suspicious but with good eye contact   Speech:  normal pitch and normal volume but tends to become loud at times   Mood:  anxious and dysthymic   Affect:  mood-congruent   Thought Process:  goal directed and illogical slightly pressured but able to be redirected and talks as if in a court of low being stilted   Thought Content:  delusions  grandiose and somatic about not being able to breathe despite being on nasal oxygen and paranoid about people out to harm her and [employer]ro[employer] inhaler tainteded with peanut oil  No current suicidal homicidal thoughts intent or plans reported    No phobias obsessions or compulsions reported   Perceptual Disturbances: None and does not appear responding to internal stimuli   Risk Potential: Tendency to not care for herself if she goes off treatment   Sensorium:  person, place, time/date, situation, day of week, month of year and time   Cognition:  grossly intact   Consciousness:  alert and awake    Attention: Intact concentration and attention span   Intellect: Considered to be at least of average intelligence   Insight:  limited in denial   Judgment: poor      Motor Activity: no abnormal movements         Recent Labs:  Results Reviewed     None          I/O Past 24 hours:  No intake/output data recorded  No intake/output data recorded  Assessment / Plan:     Schizoaffective disorder, bipolar type ([facility] [address] )      Reason for continued inpatient care:  Because of underlying paranoia and refusal to go back to the personal care home and inability to care for herself on her own  Acceptance by patient:  Accepting  Jabari Marie in recovery:  About living in another personal care home once she feels better  Understanding of medications :  Has some understanding  Involved in reintegration process:  Adjusting to the unit  Trusting in relatoinship with psychiatrist:  Trusting    Recommended Treatment:    Medication changes:  1) none today  Non-pharmacological treatments  1) start individual therapy group therapy, milieu therapy and occupational therapy and milieu therapy involving multidisciplinary team approach with psychotherapist, case management, nursing, peer support services, art therapist etc using recovery principles and psycho-education about accepting illness and need for treatment   3) check laps and encourage p o  Fluids because of low-grade fever  Safety  1) Safety/communication plan established targeting dynamic risk factors above  Discharge Plan most likely back to the a:  Personal care boarding home with act services once her delusions are managed and mood becomes more stabilized and she becomes more receptive to treatment compliance    Counseling / Coordination of Care    Total floor / unit time spent today 15 minutes   Greater than 50% of total time was spent with the patient and / or family counseling and / or coordination of care  A description of the counseling / coordination of care  Patient's Rights, confidentiality and exceptions to confidentiality, use of automated medical record, [facility] staff access to medical record, and consent to treatment reviewed      Carissa Willis MD

## 2019-09-03 NOTE — PROGRESS NOTES
09/03/19 1430   Activity/Group Checklist   Group   (Community Programming Wrap up )   Attendance Did not attend   Attendance Duration (min) 16-30   Goals Achieved   (Pt not scheduled to attend )

## 2019-09-03 NOTE — PLAN OF CARE
Problem: Depression  Goal: Refrain from isolation  Description  Interventions:  - Develop a trusting relationship   - Encourage socialization   Outcome: Not Progressing    Psychotherapy note:    Therapist once again attempted to meet with patient individually, which patient has declined due to "not feeling herself "  Therapist asked if it was possible that she was experiencing symptoms of depression, as she stated that she did not want engage in any activities  Patient considered this as a possibility, but would sooner relate a somatic complaint to her current feeling  Patient is in agreement to meet with therapist at some point this week  Therapist will follow-up

## 2019-09-03 NOTE — PLAN OF CARE
Problem: Alteration in Thoughts and Perception  Goal: Agree to be compliant with medication regime, as prescribed and report medication side effects  Description  Interventions:  - Offer appropriate PRN medication and supervise ingestion; conduct aims, as needed   Outcome: Progressing     Problem: PAIN - ADULT  Goal: Verbalizes/displays adequate comfort level or baseline comfort level  Description  Interventions:  - Encourage patient to monitor pain and request assistance  - Assess pain using appropriate pain scale  - Administer analgesics based on type and severity of pain and evaluate response  - Implement non-pharmacological measures as appropriate and evaluate response  - Consider cultural and social influences on pain and pain management  - Notify physician/advanced practitioner if interventions unsuccessful or patient reports new pain  Outcome: Progressing     Problem: SAFETY ADULT  Goal: Patient will remain free of falls  Description  INTERVENTIONS:  - Assess patient frequently for physical needs  -  Identify cognitive and physical deficits and behaviors that affect risk of falls    -  [place] fall precautions as indicated by assessment   - Educate patient/family on patient safety including physical limitations  - Instruct patient to call for assistance with activity based on assessment  - Modify environment to reduce risk of injury  - Consider OT/PT consult to assist with strengthening/mobility  Outcome: Progressing     Problem: RESPIRATORY - ADULT  Goal: Achieves optimal ventilation and oxygenation  Description  INTERVENTIONS:  - Assess for changes in respiratory status  - Assess for changes in mentation and behavior  - Position to facilitate oxygenation and minimize respiratory effort  - Oxygen administration by appropriate delivery method based on oxygen saturation (per order) or ABGs  - Initiate smoking cessation education as indicated  - Encourage broncho-pulmonary hygiene including cough, deep breathe, Incentive Spirometry  - Assess the need for suctioning and aspirate as needed  - Assess and instruct to report SOB or any respiratory difficulty  - Respiratory Therapy support as indicated  Outcome: Progressing   The patient slept through the night with no behaviors or issues noted  Fall precautions maintained  Med compliant  Oxygen maintained at 1L via NC  No s/s respiratory distress noted  Continue to monitor

## 2019-09-03 NOTE — PLAN OF CARE

## 2019-09-04 PROCEDURE — 99231 SBSQ HOSP IP/OBS SF/LOW 25: CPT | Performed by: PSYCHIATRY & NEUROLOGY

## 2019-09-04 RX ADMIN — FLUOXETINE 10 MG: 10 CAPSULE ORAL at 08:42

## 2019-09-04 RX ADMIN — CLOZAPINE 50 MG: 25 TABLET ORAL at 08:42

## 2019-09-04 RX ADMIN — TIOTROPIUM BROMIDE 18 MCG: 18 CAPSULE ORAL; RESPIRATORY (INHALATION) at 08:42

## 2019-09-04 RX ADMIN — LEVOTHYROXINE SODIUM 125 MCG: 125 TABLET ORAL at 06:03

## 2019-09-04 RX ADMIN — CLOZAPINE 50 MG: 25 TABLET ORAL at 21:32

## 2019-09-04 RX ADMIN — CLOZAPINE 50 MG: 25 TABLET ORAL at 17:46

## 2019-09-04 RX ADMIN — PANTOPRAZOLE SODIUM 40 MG: 40 TABLET, DELAYED RELEASE ORAL at 06:03

## 2019-09-04 RX ADMIN — THEOPHYLLINE ANHYDROUS 200 MG: 200 CAPSULE, EXTENDED RELEASE ORAL at 08:42

## 2019-09-04 RX ADMIN — FLUTICASONE FUROATE AND VILANTEROL TRIFENATATE 1 PUFF: 200; 25 POWDER RESPIRATORY (INHALATION) at 08:42

## 2019-09-04 RX ADMIN — MONTELUKAST SODIUM 10 MG: 10 TABLET, COATED ORAL at 21:32

## 2019-09-04 NOTE — ASSESSMENT & PLAN NOTE
Psychiatry Progress Note    Subjective: Interval History     Patient has been  refusing to attend any groups claiming that she feels fatigued and states back in her room on her bed and does not believe there is anything wrong with her psychiatrically  He has not taken any shower sin over one week  She still does not feel that safe on the unit because of the "attitude of the staff "  She still has multiple somatic compaints and underlying paranoia  She still complains of not  Breathing properly  despite breathing appropriately and having nasal oxygen on at night with acceptable pulse ox when checked    She continues to talking in a stilted manner as if talking in a court of law  She does not admit to any overt hallucinations or paranoia but still appears to harbor some covert  paranoia as she does not always trust certain nursing staff giving her the medications  She  still believes  that it is 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 and this has not changed since she came to the unit  She has chosen not to to use the portable oxygen to get out of bed and go to groups if necessary  She remains guarded suspicious evasive and in denial of her illness which has not changed since admission  No current suicidal homicidal thoughts intent or plans reported  No overt hallucinations or other delusions reported   No signs or sxs of agranulocytosis or myocarditis or endocarditis and she is moving her bowels so far with no issues       Current medications:    Current Facility-Administered Medications:     acetaminophen (TYLENOL) tablet 650 mg, 650 mg, Oral, Q6H PRN, Roberto Shankar MD    albuterol (PROVENTIL HFA,VENTOLIN HFA) inhaler 2 puff, 2 puff, Inhalation, Q4H PRN, Roberto Shankar MD, 2 puff at 07/25/19 1200    aluminum-magnesium hydroxide-simethicone (MYLANTA) 200-200-20 mg/5 mL oral suspension 15 mL, 15 mL, Oral, Q4H PRN, Roberto Shankar MD    ammonium lactate (LAC-HYDRIN) 12 % lotion 1 application, 1 application, Topical, BID PRN, Deep Durand MD    benzonatate (TESSALON PERLES) capsule 100 mg, 100 mg, Oral, TID PRN, Deep Durand MD    benztropine (COGENTIN) injection 1 mg, 1 mg, Intramuscular, Q8H PRN, Deep Durand MD    cloZAPine (CLOZARIL) tablet 50 mg, 50 mg, Oral, TID, Deep Durand MD, 50 mg at 09/04/19 0842    EPINEPHrine PF (ADRENALIN) 1 mg/mL injection 0 15 mg, 0 15 mg, Intramuscular, Once PRN, Deep Durand MD    FLUoxetine (PROzac) capsule 10 mg, 10 mg, Oral, Daily, Deep Durand MD, 10 mg at 09/04/19 0842    fluticasone-vilanterol (BREO ELLIPTA) 200-25 MCG/INH inhaler 1 puff, 1 puff, Inhalation, Daily, Deep Durand MD, 1 puff at 09/04/19 0842    ketotifen (ZADITOR) 0 025 % ophthalmic solution 1 drop, 1 drop, Right Eye, BID PRN, Deep Durand MD    levothyroxine tablet 125 mcg, 125 mcg, Oral, Early Morning, Deep Durand MD, 125 mcg at 09/04/19 0603    magnesium hydroxide (MILK OF MAGNESIA) 400 mg/5 mL oral suspension 30 mL, 30 mL, Oral, Daily PRN, Deep Durand MD    montelukast (SINGULAIR) tablet 10 mg, 10 mg, Oral, HS, Deep Durand MD, 10 mg at 09/03/19 2138    OLANZapine (ZyPREXA) IM injection 5 mg, 5 mg, Intramuscular, Q8H PRN, Deep Durand MD    OLANZapine (ZyPREXA) tablet 5 mg, 5 mg, Oral, Q8H PRN, Deep Durand MD    pantoprazole (PROTONIX) EC tablet 40 mg, 40 mg, Oral, Early Morning, Deep Durand MD, 40 mg at 09/04/19 0603    polyethylene glycol (MIRALAX) packet 17 g, 17 g, Oral, Daily PRN, Deep Durand MD    polyvinyl alcohol (LIQUIFILM TEARS) 1 4 % ophthalmic solution 1 drop, 1 drop, Both Eyes, Q3H PRN, Deep Durand MD    theophylline (JEF-24) 24 hr capsule 200 mg, 200 mg, Oral, Daily, Deep Durand MD, 200 mg at 09/04/19 0842    tiotropium (SPIRIVA) capsule for inhaler 18 mcg, 18 mcg, Inhalation, Daily, Deep Durand MD, 18 mcg at 09/04/19 0842    traZODone (DESYREL) tablet 25 mg, 25 mg, Oral, HS PRN, Deep Durand MD  Justification if on more than two antipsychotics:  Only on his clozapine  Side effects if any:  None    Risks , benefits, side effects and precautions of medications discussed with patient and reminded patient to let us know any problems with medications     Objective:     Vital Signs:  Vitals:    09/03/19 0732 09/03/19 1600 09/03/19 1908 09/04/19 0700   BP: 122/73 100/65 107/60 117/70   BP Location: Left arm Left arm Left arm Left arm   Pulse: (!) 107 89 95 83   Resp:  17 16 18   Temp:  98 9 °F (37 2 °C) 98 °F (36 7 °C) 98 °F (36 7 °C)   TempSrc:  Temporal Temporal Temporal   SpO2:  94% 91% 93%   Weight:       Height:         Appearance:  age appropriate, casually dressed and overweight older than stated age   Behavior:  deviant, evasive and guarded and suspicious but with good eye contact   Speech:  normal pitch and normal volume but tends to become loud at times   Mood:  anxious and dysthymic   Affect:  mood-congruent   Thought Process:  goal directed and illogical slightly pressured but able to be redirected and talks as if in a court of low being stilted   Thought Content:  delusions  grandiose and somatic about not being able to breathe despite being on nasal oxygen and paranoid about people out to harm her and Atrovent inhaler tainteded with peanut oil  No current suicidal homicidal thoughts intent or plans reported    No phobias obsessions or compulsions reported   Perceptual Disturbances: None and does not appear responding to internal stimuli   Risk Potential: Tendency to not care for herself if she goes off treatment   Sensorium:  person, place, time/date, situation, day of week, month of year and time   Cognition:  grossly intact   Consciousness:  alert and awake    Attention: Intact concentration and attention span   Intellect: Considered to be at least of average intelligence   Insight:  limited in denial   Judgment: poor      Motor Activity: no abnormal movements         Recent Labs:  Results Reviewed     None          I/O Past 24 hours: No intake/output data recorded  No intake/output data recorded  Assessment / Plan:     Schizoaffective disorder, bipolar type ([facility] [address] )      Reason for continued inpatient care:  Because of underlying paranoia and refusal to go back to the personal care home and inability to care for herself on her own  Acceptance by patient:  Accepting  Clara Arredondo in recovery:  About living in another personal care home once she feels better  Understanding of medications :  Has some understanding  Involved in reintegration process:  Adjusting to the unit  Trusting in relatoinship with psychiatrist:  Trusting    Recommended Treatment:    Medication changes:  1) none today  Non-pharmacological treatments  1) start individual therapy group therapy, milieu therapy and occupational therapy and milieu therapy involving multidisciplinary team approach with psychotherapist, case management, nursing, peer support services, art therapist etc using recovery principles and psycho-education about accepting illness and need for treatment   3) check laps and encourage p o  Fluids because of low-grade fever  Safety  1) Safety/communication plan established targeting dynamic risk factors above  Discharge Plan most likely back to the a:  Personal care boarding home with act services once her delusions are managed and mood becomes more stabilized and she becomes more receptive to treatment compliance    Counseling / Coordination of Care    Total floor / unit time spent today 15 minutes  Greater than 50% of total time was spent with the patient and / or family counseling and / or coordination of care  A description of the counseling / coordination of care  Patient's Rights, confidentiality and exceptions to confidentiality, use of automated medical record, [facility] staff access to medical record, and consent to treatment reviewed      Oval MD Joseluis

## 2019-09-04 NOTE — PLAN OF CARE
Problem: Risk for Self Injury/Neglect  Goal: Refrain from harming self  Description  Interventions:  - Monitor patient closely, per order  - Develop a trusting relationship  - Supervise medication ingestion, monitor effects and side effects   Outcome: Progressing     Problem: Anxiety  Goal: Anxiety is at manageable level  Description  Interventions:  - Assess and monitor patient's anxiety level  - Monitor for signs and symptoms of anxiety both physical and emotional (heart palpitations, chest pain, shortness of breath, headaches, nausea, feeling jumpy, restlessness, irritable, apprehensive)  - Collaborate with interdisciplinary team and initiate plan and interventions as ordered  - Vida patient to unit/surroundings  - Explain treatment plan  - Encourage participation in care  - Encourage verbalization of concerns/fears  - Identify coping mechanisms  - Assist in developing anxiety-reducing skills  - Administer/offer alternative therapies  - Limit or eliminate stimulants  Outcome: Progressing     Problem: Alteration in Orientation  Goal: Interact with staff daily  Description  Interventions:  - Assess and re-assess patient's level of orientation  - Engage patient in 1 on 1 interactions, daily, for a minimum of 15 minutes   - Establish rapport/trust with patient   Outcome: Progressing     Problem: SAFETY ADULT  Goal: Patient will remain free of falls  Description  INTERVENTIONS:  - Assess patient frequently for physical needs  -  Identify cognitive and physical deficits and behaviors that affect risk of falls    -  [place] fall precautions as indicated by assessment   - Educate patient/family on patient safety including physical limitations  - Instruct patient to call for assistance with activity based on assessment  - Modify environment to reduce risk of injury  - Consider OT/PT consult to assist with strengthening/mobility  Outcome: Progressing     Problem: RESPIRATORY - ADULT  Goal: Achieves optimal ventilation and oxygenation  Description  INTERVENTIONS:  - Assess for changes in respiratory status  - Assess for changes in mentation and behavior  - Position to facilitate oxygenation and minimize respiratory effort  - Oxygen administration by appropriate delivery method based on oxygen saturation (per order) or ABGs  - Initiate smoking cessation education as indicated  - Encourage broncho-pulmonary hygiene including cough, deep breathe, Incentive Spirometry  - Assess the need for suctioning and aspirate as needed  - Assess and instruct to report SOB or any respiratory difficulty  - Respiratory Therapy support as indicated  Outcome: Progressing     Problem: Alteration in Thoughts and Perception  Goal: Attend and participate in unit activities, including therapeutic, recreational, and educational groups  Description  Interventions:  - Provide therapeutic and educational activities daily, encourage attendance and participation, and document same in the medical record     CERTIFIED PEER SPECIALIST INTERVENTIONS:    Complete peer assessment with patient to assess their needs and identify their goals to complete while in the recovery program as well as once discharged into the community  Patient will complete WRAP Plan, Crisis Plan and [facility] Domains  Patient will attend 50% of groups offered on the unit  Patient will complete a goal card weekly      Outcome: Not Progressing  Goal: Complete daily ADLs, including personal hygiene independently, as able  Description  Interventions:  - Observe, teach, and assist patient with ADLS  - Monitor and promote a balance of rest/activity, with adequate nutrition and elimination   Outcome: Not Progressing     Problem: Ineffective Coping  Goal: Participates in unit activities  Description  Interventions:  - Provide therapeutic environment   - Provide required programming   - Redirect inappropriate behaviors   Outcome: Not Progressing     Problem: Risk for Self Injury/Neglect  Goal: Attend and participate in unit activities, including therapeutic, recreational, and educational groups  Description  Interventions:  - Provide therapeutic and educational activities daily, encourage attendance and participation, and document same in the medical record  - Obtain collateral information, encourage visitation and family involvement in care   Outcome: Not Progressing     Problem: Depression  Goal: Refrain from isolation  Description  Interventions:  - Develop a trusting relationship   - Encourage socialization   Outcome: Not Josué Keenan has been isolative to her room except for coming out for meal/snack and medications when prompted  Social with select peers  Likes to lay in bed  Incentive spirometer was done at the beginning of the shift  She did 10 breaths to 1100ml  She will not ask for it  It has to be taken to her for her to do it  No complaint of shortness of breath  Did not attend groups this evening  VSS  No BM or shower this shift  Came out of her room for snack and took her HS medication  Oxygen level was reported as 87% on RA when she went to her room after snack  No signs of respiratory distress  Upon recheck it was 90-91%  Oxygen 1 liter via nasal cannula applied as ordered at HS  Continue to monitor  Precautions maintained

## 2019-09-04 NOTE — PLAN OF CARE
Problem: Alteration in Thoughts and Perception  Goal: Agree to be compliant with medication regime, as prescribed and report medication side effects  Description  Interventions:  - Offer appropriate PRN medication and supervise ingestion; conduct aims, as needed   Outcome: Progressing     Problem: Ineffective Coping  Goal: Participates in unit activities  Description  Interventions:  - Provide therapeutic environment   - Provide required programming   - Redirect inappropriate behaviors   Outcome: Progressing     Problem: SAFETY ADULT  Goal: Patient will remain free of falls  Description  INTERVENTIONS:  - Assess patient frequently for physical needs  -  Identify cognitive and physical deficits and behaviors that affect risk of falls  -  [place] fall precautions as indicated by assessment   - Educate patient/family on patient safety including physical limitations  - Instruct patient to call for assistance with activity based on assessment  - Modify environment to reduce risk of injury  - Consider OT/PT consult to assist with strengthening/mobility  Outcome: Progressing     Problem: Alteration in Thoughts and Perception  Goal: Complete daily ADLs, including personal hygiene independently, as able  Description  Interventions:  - Observe, teach, and assist patient with ADLS  - Monitor and promote a balance of rest/activity, with adequate nutrition and elimination   Outcome: Not Progressing    Attended [facility] group and had treatment team today  Appetite intact, compliant with routine medications, somatic "feeling nausea" after eating breakfast refused to shower today because of the nausea, ginger ale and cracker given, vital signs stable, pulse ox 95% on room air, Utilized incentive spirometer

## 2019-09-04 NOTE — PLAN OF CARE
Problem: Alteration in Thoughts and Perception  Goal: Agree to be compliant with medication regime, as prescribed and report medication side effects  Description  Interventions:  - Offer appropriate PRN medication and supervise ingestion; conduct aims, as needed   Outcome: Progressing     Problem: RESPIRATORY - ADULT  Goal: Achieves optimal ventilation and oxygenation  Description  INTERVENTIONS:  - Assess for changes in respiratory status  - Assess for changes in mentation and behavior  - Position to facilitate oxygenation and minimize respiratory effort  - Oxygen administration by appropriate delivery method based on oxygen saturation (per order) or ABGs  - Initiate smoking cessation education as indicated  - Encourage broncho-pulmonary hygiene including cough, deep breathe, Incentive Spirometry  - Assess the need for suctioning and aspirate as needed  - Assess and instruct to report SOB or any respiratory difficulty  - Respiratory Therapy support as indicated  Outcome: Progressing     Problem: Alteration in Thoughts and Perception  Goal: Complete daily ADLs, including personal hygiene independently, as able  Description  Interventions:  - Observe, teach, and assist patient with ADLS  - Monitor and promote a balance of rest/activity, with adequate nutrition and elimination   Outcome: Not Progressing     Problem: Ineffective Coping  Goal: Demonstrates healthy coping skills  Outcome: Not Progressing     Problem: Depression  Goal: Refrain from isolation  Description  Interventions:  - Develop a trusting relationship   - Encourage socialization   Outcome: Not Mohsen Danker continues to refuse hygiene   Has many excuses including shower chair being unsanitary (it is sanitized between each user), lack of anti-slip stickers on shower floor (shower mats in place, MHT there to assist her), feeling nauseous, tired (rested in bed after meal), no clean clothes (clothes were laundered for her), finally has no blow dryer (this is valid) & says can't go to bed w/a wet head or will get sick  Insists will shower "tomorrow"  Ate poorly @ meal, 20%  Did come up for her supper medicine  Is having complaint of drooling & intends to revisit topic, possible remedies w/her doctor  Is using the Incentive Spirometer w/increasing strength, improvement in volume (up to 1100ml consistently)  Isolates in room in bed in free time  No oral care, no hair care, no extra laps beyond necessity  Is pleasant, but, socially withdrawn

## 2019-09-04 NOTE — PROGRESS NOTES
Patient unable to do Timeline due to vision issues  Spent time with this writer goal planning  09/04/19 1100   Activity/Group Checklist   Group   (IMR/Timelines )   Attendance Attended   Attendance Duration (min) 46-60   Interactions Interacted appropriately   Affect/Mood Appropriate;Normal range   Goals Achieved Identified feelings; Discussed discharge plans; Discussed coping strategies; Able to listen to others; Able to engage in interactions; Able to self-disclose; Able to recieve feedback; Identified relapse prevention strategies; Identified resources and support systems

## 2019-09-04 NOTE — PROGRESS NOTES
Progress Note - Efraín Sizer 1955, 60 y o  female MRN: [id number]    [facility] Encounter: [id number]    Primary Care Provider: Deny Garcia MD   Date and time admitted to hospital: 7/23/2019  5:30 PM        * Schizoaffective disorder, bipolar type [facility])  Assessment & Plan  Psychiatry Progress Note    Subjective: Interval History     Patient has been  refusing to attend any groups claiming that she feels fatigued and states back in her room on her bed and does not believe there is anything wrong with her psychiatrically  He has not taken any shower sin over one week  She still does not feel that safe on the unit because of the "attitude of the staff "  She still has multiple somatic compaints and underlying paranoia  She still complains of not  Breathing properly  despite breathing appropriately and having nasal oxygen on at night with acceptable pulse ox when checked    She continues to talking in a stilted manner as if talking in a court of law  She does not admit to any overt hallucinations or paranoia but still appears to harbor some covert  paranoia as she does not always trust certain nursing staff giving her the medications  She  still believes  that it is 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 and this has not changed since she came to the unit  She has chosen not to to use the portable oxygen to get out of bed and go to groups if necessary  She remains guarded suspicious evasive and in denial of her illness which has not changed since admission  No current suicidal homicidal thoughts intent or plans reported  No overt hallucinations or other delusions reported   No signs or sxs of agranulocytosis or myocarditis or endocarditis and she is moving her bowels so far with no issues       Current medications:    Current Facility-Administered Medications:     acetaminophen (TYLENOL) tablet 650 mg, 650 mg, Oral, Q6H PRN, Jeet Levine MD    albuterol (PROVENTIL HFA,VENTOLIN HFA) inhaler 2 puff, 2 puff, Inhalation, Q4H PRN, Roberto Shankar MD, 2 puff at 07/25/19 1200    aluminum-magnesium hydroxide-simethicone (MYLANTA) 200-200-20 mg/5 mL oral suspension 15 mL, 15 mL, Oral, Q4H PRN, Roberto Shankar MD    ammonium lactate (LAC-HYDRIN) 12 % lotion 1 application, 1 application, Topical, BID PRN, Roberto Shankar MD    benzonatate (TESSALON PERLES) capsule 100 mg, 100 mg, Oral, TID PRN, Roberto Shankar MD    benztropine (COGENTIN) injection 1 mg, 1 mg, Intramuscular, Q8H PRN, Roberto Shankar MD    cloZAPine (CLOZARIL) tablet 50 mg, 50 mg, Oral, TID, Roberto Shankar MD, 50 mg at 09/04/19 0842    EPINEPHrine PF (ADRENALIN) 1 mg/mL injection 0 15 mg, 0 15 mg, Intramuscular, Once PRN, Roberto Shankar MD    FLUoxetine (PROzac) capsule 10 mg, 10 mg, Oral, Daily, Roberto Shankar MD, 10 mg at 09/04/19 0842    fluticasone-vilanterol (BREO ELLIPTA) 200-25 MCG/INH inhaler 1 puff, 1 puff, Inhalation, Daily, Roberto Shankar MD, 1 puff at 09/04/19 0842    ketotifen (ZADITOR) 0 025 % ophthalmic solution 1 drop, 1 drop, Right Eye, BID PRN, Roberto Shankar MD    levothyroxine tablet 125 mcg, 125 mcg, Oral, Early Morning, Roberto Shankar MD, 125 mcg at 09/04/19 0603    magnesium hydroxide (MILK OF MAGNESIA) 400 mg/5 mL oral suspension 30 mL, 30 mL, Oral, Daily PRN, Roberto Shankar MD    montelukast (SINGULAIR) tablet 10 mg, 10 mg, Oral, HS, Roberto Shankar MD, 10 mg at 09/03/19 2138    OLANZapine (ZyPREXA) IM injection 5 mg, 5 mg, Intramuscular, Q8H PRN, Roberto Shankar MD    OLANZapine (ZyPREXA) tablet 5 mg, 5 mg, Oral, Q8H PRN, Roberto Shankar MD    pantoprazole (PROTONIX) EC tablet 40 mg, 40 mg, Oral, Early Morning, Roberto Shankar MD, 40 mg at 09/04/19 0603    polyethylene glycol (MIRALAX) packet 17 g, 17 g, Oral, Daily PRN, Roberto Shankar MD    polyvinyl alcohol (LIQUIFILM TEARS) 1 4 % ophthalmic solution 1 drop, 1 drop, Both Eyes, Q3H PRN, Roberto Shankar MD    theophylline (JEF-24) 24 hr capsule 200 mg, 200 mg, Oral, Daily, Ervin Little MD, 200 mg at 09/04/19 0842    tiotropium [facility]) capsule for inhaler 18 mcg, 18 mcg, Inhalation, Daily, Ervin Little MD, 18 mcg at 09/04/19 0842    traZODone (DESYREL) tablet 25 mg, 25 mg, Oral, HS PRN, Ervin Little MD  Justification if on more than two antipsychotics:  Only on his clozapine  Side effects if any:  None    Risks , benefits, side effects and precautions of medications discussed with patient and reminded patient to let us know any problems with medications     Objective:     Vital Signs:  Vitals:    09/03/19 0732 09/03/19 1600 09/03/19 1908 09/04/19 0700   BP: 122/73 100/65 107/60 117/70   BP Location: Left arm Left arm Left arm Left arm   Pulse: (!) 107 89 95 83   Resp:  17 16 18   Temp:  98 9 °F (37 2 °C) 98 °F (36 7 °C) 98 °F (36 7 °C)   TempSrc:  Temporal Temporal Temporal   SpO2:  94% 91% 93%   Weight:       Height:         Appearance:  age appropriate, casually dressed and overweight older than stated age   Behavior:  deviant, evasive and guarded and suspicious but with good eye contact   Speech:  normal pitch and normal volume but tends to become loud at times   Mood:  anxious and dysthymic   Affect:  mood-congruent   Thought Process:  goal directed and illogical slightly pressured but able to be redirected and talks as if in a court of low being stilted   Thought Content:  delusions  grandiose and somatic about not being able to breathe despite being on nasal oxygen and paranoid about people out to harm her and Atrovent inhaler tainteded with peanut oil  No current suicidal homicidal thoughts intent or plans reported    No phobias obsessions or compulsions reported   Perceptual Disturbances: None and does not appear responding to internal stimuli   Risk Potential: Tendency to not care for herself if she goes off treatment   Sensorium:  person, place, time/date, situation, day of week, month of year and time   Cognition:  grossly intact   Consciousness:  alert and awake    Attention: Intact concentration and attention span   Intellect: Considered to be at least of average intelligence   Insight:  limited in denial   Judgment: poor      Motor Activity: no abnormal movements         Recent Labs:  Results Reviewed     None          I/O Past 24 hours:  No intake/output data recorded  No intake/output data recorded  Assessment / Plan:     Schizoaffective disorder, bipolar type ([facility] [address] )      Reason for continued inpatient care:  Because of underlying paranoia and refusal to go back to the personal care home and inability to care for herself on her own  Acceptance by patient:  Accepting  Regan Keita in recovery:  About living in another personal care home once she feels better  Understanding of medications :  Has some understanding  Involved in reintegration process:  Adjusting to the unit  Trusting in relatoinship with psychiatrist:  Trusting    Recommended Treatment:    Medication changes:  1) none today  Non-pharmacological treatments  1) start individual therapy group therapy, milieu therapy and occupational therapy and milieu therapy involving multidisciplinary team approach with psychotherapist, case management, nursing, peer support services, art therapist etc using recovery principles and psycho-education about accepting illness and need for treatment   3) check laps and encourage p o  Fluids because of low-grade fever  Safety  1) Safety/communication plan established targeting dynamic risk factors above  Discharge Plan most likely back to the a:  Personal care boarding home with act services once her delusions are managed and mood becomes more stabilized and she becomes more receptive to treatment compliance    Counseling / Coordination of Care    Total floor / unit time spent today 15 minutes  Greater than 50% of total time was spent with the patient and / or family counseling and / or coordination of care  A description of the counseling / coordination of care  Patient's Rights, confidentiality and exceptions to confidentiality, use of automated medical record, [address] staff access to medical record, and consent to treatment reviewed      Prakash Brewster MD

## 2019-09-04 NOTE — PROGRESS NOTES
Pharmacy Clozapine Monitoring Progress Note     Sen Garvey is receiving a total daily dose of 150 mg of clozapine and receiving it as 50mg three times daily  Pharmacist Recommendations Based on Assessment and Plan (below):    1  Patient is Clozapine-REMS eligible, ANC was updated, with weekly monitoring frequency and the next [address] is due on 9/7/2019     2  Recommend repeat EKG for myocarditis monitoring  3  Recommend prophylactic bowel regimen  Assessment/Plan:    Phase of Treatment:     Current phase of treatment is initation/titration  Patient Clozapine REMS Eligibility:     Patient is eligible to receive clozapine and the [address] monitoring frequency is weekly per clozapine REMS  The patient's latest [address] value has been updated into the Clozapine-REMS program          ANC and Clozapine Level (if available)     The most recent [address] was   Lab Results   Component Value Date    NEUTROABS 3 82 08/31/2019    and the next lab is due on 9/7/19  Last Clozapine level (if available):    0   Lab Value Date/Time    CLOZAPINE 324 (L) 08/16/2019 1131     0   Lab Value Date/Time    NCLOZIP 207 08/16/2019 1131           Monitoring Parameters for Clozapine:     Clozapine Adverse Effect Suggested Monitoring Patient's Current Status    Agranulocytosis ANC baseline and then repeat weekly for first 6 months and every 2 weeks for the second 6 months  Maintenance after one year of therapy every month  The most recent [address] was   Lab Results   Component Value Date    NEUTROABS 3 82 08/31/2019       This ANC is considered normal range (ANC >/= 1500/mcL)  Continue current treatment and monitoring course  Respiratory Depression Please ensure patient is not on concomitant respiratory lowering medications such as benzodiazepines, sedative hypnotics (eg  zolpidem) This patient is not on respiratory depression lowering medications   Myocarditis Baseline and weekly EKG, CRP, BNP, and troponin    Weekly symptomatic complaints of fatigue, dyspnea, tachycardia, chest pain, palpitations, and fever for the first 4 weeks  Last EKG Results on  427 showed:  T wave abnormalities    Last QTC value was:  0   Lab Value Date/Time    QTCINT 427 08/21/2019 1133       Last BNP was: No results found for: NTBNP    Last Troponin was:  0   Lab Value Date/Time    TROPONINI <0 01 05/01/2019 1318    TROPONINI <0 04 05/10/2015 1948        Orthostatic hypotension/  bradycardia Blood pressure and vital signs      Monitor blood pressure and orthostatic hypotension at least twice daily upon initiation and continue to follow at least once daily afterwards  Patient's last recorded vital signs:       /70 (BP Location: Left arm)   Pulse 83   Temp 98 °F (36 7 °C) (Temporal)   Resp 18   Ht 5' 4" (1 626 m)   Wt 68 2 kg (150 lb 6 4 oz)   SpO2 93%   BMI 25 82 kg/m²             Constipation (bowel obstruction due to high anticholinergic clozapine burden) Assess at baseline and weekly during first four months of therapy  Docusate 100mg BID and Miralax 17 grams daily recommended initially  Prophylactic bowel regimen not ordered and it is recommended to order a standing bowel regimen to reduce risk of bowel obstruction associated with clozapine therapy  Sialorrhea Assess at baseline and weekly during first four months of therapy  May be managed using sublingual anticholinergic preparations (atropine 1% eye drops)  Patient is not experiencing sialorrhea  This will continue to be monitored  Please note that per current REMS protocol the provider can submit a treatment rationale that justifies clozapine treatment even if patient parameters are outside of REMS recommendations  The Clozapine REMS is an FDA-mandated program implemented by the manufacturers of clozapine  ([employer]  [employer]/home  u)  Pharmacy will continue to follow patient with team, thank you    Electronically signed by: Maik Valenzuela Iggy Mcnally, PharmD, [address], Clinical Pharmacist - Psychiatry

## 2019-09-04 NOTE — PROGRESS NOTES
09/04/19 1000   Team Meeting   Meeting Type Daily Rounds   Initial Conference Date 09/04/19   Next Conference Date 09/11/19   Team Members Present   Team Members Present Physician;Nurse;; Other (Discipline and Name)   Physician Team Member Dr Francis Franco Team Member Lu Young, RN   Care Management Team Member Brenda Hemphill   Other (Discipline and Name) Anthony Colindres [facility]; Michael Barone [facility]; Gabbie Hardin [facility]   Patient/Family Present   Patient Present Yes   Patient's Family Present No     Patient attended treatment team meeting this morning without a self evaluation completed  Patient attended 26% of groups offered last week  When asked why she isn't attending more groups, caring for her hygiene, etc  patient provides an excuse for everything  Patient also spoke to [facility] with [facility] regarding where she can live when she leaves the hospital  Doroteo Rudolph stated she can be considered for returning to [address] [place] [address], however, she has to be willing to work with the staff and pay for her room and board  He also noted that she may need a representative payee to be considered for return  No other questions/concerns made known by patient

## 2019-09-04 NOTE — PROGRESS NOTES
09/04/19 0900   Team Meeting   Meeting Type Daily Rounds   Team Members Present   Team Members Present Physician;Nurse;; Other (Discipline and Name)   Physician Team Member Dr Rosa Jimenez Team Member Jewels Snyder RN   Care Management Team Member Brenda So   Other (Discipline and Name) [facility]     Patient's last shower was on 08/28  Not attending groups  Slept

## 2019-09-04 NOTE — PLAN OF CARE
Problem: PAIN - ADULT  Goal: Verbalizes/displays adequate comfort level or baseline comfort level  Description  Interventions:  - Encourage patient to monitor pain and request assistance  - Assess pain using appropriate pain scale  - Administer analgesics based on type and severity of pain and evaluate response  - Implement non-pharmacological measures as appropriate and evaluate response  - Consider cultural and social influences on pain and pain management  - Notify physician/advanced practitioner if interventions unsuccessful or patient reports new pain  Outcome: Progressing     Problem: SAFETY ADULT  Goal: Patient will remain free of falls  Description  INTERVENTIONS:  - Assess patient frequently for physical needs  -  Identify cognitive and physical deficits and behaviors that affect risk of falls    -  [place] fall precautions as indicated by assessment   - Educate patient/family on patient safety including physical limitations  - Instruct patient to call for assistance with activity based on assessment  - Modify environment to reduce risk of injury  - Consider OT/PT consult to assist with strengthening/mobility  Outcome: Progressing     Problem: RESPIRATORY - ADULT  Goal: Achieves optimal ventilation and oxygenation  Description  INTERVENTIONS:  - Assess for changes in respiratory status  - Assess for changes in mentation and behavior  - Position to facilitate oxygenation and minimize respiratory effort  - Oxygen administration by appropriate delivery method based on oxygen saturation (per order) or ABGs  - Initiate smoking cessation education as indicated  - Encourage broncho-pulmonary hygiene including cough, deep breathe, Incentive Spirometry  - Assess the need for suctioning and aspirate as needed  - Assess and instruct to report SOB or any respiratory difficulty  - Respiratory Therapy support as indicated  Outcome: Cheri Simons maintained on ongoing fall, aspiration and SAFE precaution without incident on this shift   Laying in bed with her eyes closed, breath even and unlabored with o2 @1L/m with the humidification   No sign or symptom of respiratory distress noted  Marah He continues to remain free from fall   Denies any pain or discomfort   Thus far she is in stabled condition   Q 15 minutes checks during the night continues  No behavioral noted    Will continue to monitor behavioral, respiratory, and sleep pattern

## 2019-09-05 PROCEDURE — 99231 SBSQ HOSP IP/OBS SF/LOW 25: CPT | Performed by: PSYCHIATRY & NEUROLOGY

## 2019-09-05 RX ADMIN — FLUOXETINE 10 MG: 10 CAPSULE ORAL at 08:30

## 2019-09-05 RX ADMIN — CLOZAPINE 50 MG: 25 TABLET ORAL at 21:29

## 2019-09-05 RX ADMIN — THEOPHYLLINE ANHYDROUS 200 MG: 200 CAPSULE, EXTENDED RELEASE ORAL at 08:30

## 2019-09-05 RX ADMIN — CLOZAPINE 50 MG: 25 TABLET ORAL at 08:30

## 2019-09-05 RX ADMIN — LEVOTHYROXINE SODIUM 125 MCG: 125 TABLET ORAL at 06:11

## 2019-09-05 RX ADMIN — TIOTROPIUM BROMIDE 18 MCG: 18 CAPSULE ORAL; RESPIRATORY (INHALATION) at 08:30

## 2019-09-05 RX ADMIN — FLUTICASONE FUROATE AND VILANTEROL TRIFENATATE 1 PUFF: 200; 25 POWDER RESPIRATORY (INHALATION) at 08:30

## 2019-09-05 RX ADMIN — MONTELUKAST SODIUM 10 MG: 10 TABLET, COATED ORAL at 21:29

## 2019-09-05 RX ADMIN — PANTOPRAZOLE SODIUM 40 MG: 40 TABLET, DELAYED RELEASE ORAL at 06:11

## 2019-09-05 RX ADMIN — CLOZAPINE 50 MG: 25 TABLET ORAL at 17:34

## 2019-09-05 NOTE — PLAN OF CARE
Problem: Alteration in Thoughts and Perception  Goal: Verbalize thoughts and feelings  Description  Interventions:  - Promote a nonjudgmental and trusting relationship with the patient through active listening and therapeutic communication  - Assess patient's level of functioning, behavior and potential for risk  - Engage patient in 1 on 1 interactions for a minimum of 15 minutes each session  - Encourage patient to express fears, feelings, frustrations, and discuss symptoms    - [place] patient to reality, help patient recognize reality-based thinking   - Administer medications as ordered and assess for potential side effects  - Provide the patient education related to the signs and symptoms of the illness and desired effects of prescribed medications  Outcome: Progressing     Problem: Ineffective Coping  Goal: Identifies healthy coping skills  Outcome: Progressing     Problem: Risk for Self Injury/Neglect  Goal: Refrain from harming self  Description  Interventions:  - Monitor patient closely, per order  - Develop a trusting relationship  - Supervise medication ingestion, monitor effects and side effects   Outcome: Progressing  Goal: Complete daily ADLs, including personal hygiene independently, as able  Description  Interventions:  - Observe, teach, and assist patient with ADLS  - Monitor and promote a balance of rest/activity, with adequate nutrition and elimination  Outcome: Progressing     Problem: Depression  Goal: Refrain from self-neglect  Outcome: Progressing     Patient isolative to self and room throughout majority of shift, remained in bed  Requires prompting for meds and meals, tolerating meds whole with water and meal compliant with fair appetite  Pt did get OOB and shower this shift but did not wash hair after much prompting and encouragement  Did not attend any groups  Denies any SI/HI, AH,VH at this time   Will continue to encourage pt to get OOB and attend milieu activities in order to express self safely

## 2019-09-05 NOTE — PROGRESS NOTES
Patient did not attend despite prompting and encouragement from this writer and nursing staff        09/05/19 1100   Activity/Group Checklist   Group   (IMR/ Timeline )   Attendance Did not attend   Attendance Duration (min) 46-60   Affect/Mood IRMA

## 2019-09-05 NOTE — PROGRESS NOTES
2145 Staci Baum did attend PM Group outside on deck  "I'm going to try something new " This was lauded & encouraged  Did have an HS snack afterwards & came up on own for HS medicine  Wearing her QHS nasal O2 @ 1L now for bed

## 2019-09-05 NOTE — PLAN OF CARE
Problem: Alteration in Thoughts and Perception  Goal: Attend and participate in unit activities, including therapeutic, recreational, and educational groups  Description  Interventions:  - Provide therapeutic and educational activities daily, encourage attendance and participation, and document same in the medical record     CERTIFIED PEER SPECIALIST INTERVENTIONS:    Complete peer assessment with patient to assess their needs and identify their goals to complete while in the recovery program as well as once discharged into the community  Patient will complete WRAP Plan, Crisis Plan and [facility] Life Domains  Patient will attend 50% of groups offered on the unit  Patient will complete a goal card weekly  Outcome: Progressing    Patient was able to meet with this writer and complete Peer Assessment  Patient claimed [place] however, it is  and was left at [address] group home  Pt does not have any forensic concerns but does have visual and mobility concerns such as COPD and cataracts  Patient does feel she is ready to live outside in the community proclaiming, " I am mostly here due to not having a home to go to  I can not cook, clean or take care of home responsibilities anymore " To strengthen her recovery while she is here, she believes that being able to shower and be comfortable in doing so, is what she needs to focus on  Patient was able to identify increased depression, isolation and anxiety were symptoms she was experiencing before her hospitalization yet still not able to describe any coping skills she uses or used in the past to assist her with these symptoms   Her expectations, regarding staff  support is to educate her on managing her anxiety and depression in hopes of not returning to a hospital  Pt was able to list her strengths as loyal, trustworthy, wisdom, determination and caring for others and her hopes/dreams for the future is to develop relationships with her son and her family members  Patient also stated she has used community supports such as an ACT team and was a member at [facility] until her health issues increased  Patient is not opposed to going back into a group home although her desire is to live in her own place, on the other hand she is aware that may not be possible due to her declining health  When speaking of independent living tasks patient stated she needs assistance with cooking, shopping, cleaning, laundry, finding a place to live, money management, and keeping up with her hygiene  Patient did indicate she belonged to [employer] at one time however, since brother stopped going transportation has been an issue for she is not able to ride public transportation alone  Patient was educated on working and receiving benefits and declined additional information on a Psychiatric Advanced Directive  Patient would benefit from a group home with additional care concerning her health needs and community support connections such as a [address] [place] [address] where she can socialize and engage in center activities  Writer and Patient will begin to work on her [place] and community supports

## 2019-09-05 NOTE — ASSESSMENT & PLAN NOTE
Psychiatry Progress Note    Subjective: Interval History     Patient has been  refusing showers for several days in a row and has been wearing the same clothing but did start attending a few groups as of yesterday  She continues to verbalize her suspicion and mistrust about certain staff who gives her medications and gives her the inhaler  She still does not feel that safe on the unit because of the "attitude of the staff "and she continues to have several somatic complaints  She still complains of not  Breathing properly  despite breathing appropriately and having nasal oxygen on at night with acceptable pulse ox when checked by staff   She continues talk in a stilted manner as if talking in a court of law  She does not admit to any overt hallucinations or paranoia but still appears to harbor some covert  paranoia   She  still believes  that it is 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 and this has not changed since she came to the unit  She has chosen not to to use the portable oxygen to get out of bed and go to groups if necessary  She remains guarded suspicious evasive and in denial of her illness on an ongoing basis  No current suicidal homicidal thoughts intent or plans reported  No overt hallucinations or other delusions reported   No signs or sxs of agranulocytosis or myocarditis or endocarditis and she is moving her bowels so far with no issues  Her last EKG was acceptable from August 22nd 2019   She tends to his lay back on her bed most of the time rather than get up or attend groups other than for meals and medications off and on    Current medications:    Current Facility-Administered Medications:     acetaminophen (TYLENOL) tablet 650 mg, 650 mg, Oral, Q6H PRN, Zac Sierra MD    albuterol (PROVENTIL HFA,VENTOLIN HFA) inhaler 2 puff, 2 puff, Inhalation, Q4H PRN, Zac Sierra MD, 2 puff at 07/25/19 1200    aluminum-magnesium hydroxide-simethicone ([facility]) [phone] mg/5 mL oral suspension 15 mL, 15 mL, Oral, Q4H PRN, Mynor Terry MD    ammonium lactate (LAC-HYDRIN) 12 % lotion 1 application, 1 application, Topical, BID PRN, Mynor Terry MD    benzonatate (TESSALON PERLES) capsule 100 mg, 100 mg, Oral, TID PRN, Mynor Terry MD    benztropine (COGENTIN) injection 1 mg, 1 mg, Intramuscular, Q8H PRN, Mynor Terry MD    cloZAPine (CLOZARIL) tablet 50 mg, 50 mg, Oral, TID, Mynor Terry MD, 50 mg at 09/05/19 0830    EPINEPHrine PF (ADRENALIN) 1 mg/mL injection 0 15 mg, 0 15 mg, Intramuscular, Once PRN, Mynor Terry MD    FLUoxetine (PROzac) capsule 10 mg, 10 mg, Oral, Daily, Mynor Terry MD, 10 mg at 09/05/19 0830    fluticasone-vilanterol (BREO ELLIPTA) 200-25 MCG/INH inhaler 1 puff, 1 puff, Inhalation, Daily, Mynor Terry MD, 1 puff at 09/05/19 0830    ketotifen (ZADITOR) 0 025 % ophthalmic solution 1 drop, 1 drop, Right Eye, BID PRN, Mynor Terry MD    levothyroxine tablet 125 mcg, 125 mcg, Oral, Early Morning, Mynor Terry MD, 125 mcg at 09/05/19 5141    magnesium hydroxide (MILK OF MAGNESIA) 400 mg/5 mL oral suspension 30 mL, 30 mL, Oral, Daily PRN, Mynor Terry MD    montelukast (SINGULAIR) tablet 10 mg, 10 mg, Oral, HS, Mynor Terry MD, 10 mg at 09/04/19 2132    OLANZapine (ZyPREXA) IM injection 5 mg, 5 mg, Intramuscular, Q8H PRN, Mynor Terry MD    OLANZapine (ZyPREXA) tablet 5 mg, 5 mg, Oral, Q8H PRN, Mynor Terry MD    pantoprazole (PROTONIX) EC tablet 40 mg, 40 mg, Oral, Early Morning, Mynor Terry MD, 40 mg at 09/05/19 5096    polyethylene glycol (MIRALAX) packet 17 g, 17 g, Oral, Daily PRN, Mynor Terry MD    polyvinyl alcohol (LIQUIFILM TEARS) 1 4 % ophthalmic solution 1 drop, 1 drop, Both Eyes, Q3H PRN, Mynor Terry MD    theophylline (JEF-24) 24 hr capsule 200 mg, 200 mg, Oral, Daily, Mynor Terry MD, 200 mg at 09/05/19 0830    tiotropium (SPIRIVA) capsule for inhaler 18 mcg, 18 mcg, Inhalation, Daily, Mynor Terry MD, 18 mcg at 09/05/19 0830   traZODone (DESYREL) tablet 25 mg, 25 mg, Oral, HS PRN, Shaggy Rangel MD  Justification if on more than two antipsychotics:  Only on his clozapine  Side effects if any:  None    Risks , benefits, side effects and precautions of medications discussed with patient and reminded patient to let us know any problems with medications     Objective:     Vital Signs:  Vitals:    09/04/19 1900 09/04/19 2130 09/05/19 0730 09/05/19 0732   BP: 114/72  140/79 96/68   BP Location: Left arm  Left arm Left arm   Pulse: 79  92 104   Resp: 17  17    Temp: 97 7 °F (36 5 °C)  98 4 °F (36 9 °C)    TempSrc: Temporal  Temporal    SpO2: 95% 94%     Weight:       Height:         Appearance:  age appropriate, casually dressed and overweight older than stated age   Behavior:  deviant, evasive and guarded and suspicious but with good eye contact   Speech:  normal pitch and normal volume but tends to become loud at times   Mood:  anxious and dysthymic   Affect:  mood-congruent   Thought Process:  goal directed and illogical slightly pressured but able to be redirected and talks as if in a court of low being stilted   Thought Content:  delusions  grandiose and somatic about not being able to breathe despite being on nasal oxygen and paranoid about people out to harm her and Atrovent inhaler tainteded with peanut oil  No current suicidal homicidal thoughts intent or plans reported    No phobias obsessions or compulsions reported   Perceptual Disturbances: None and does not appear responding to internal stimuli   Risk Potential: Tendency to not care for herself if she goes off treatment   Sensorium:  person, place, time/date, situation, day of week, month of year and time   Cognition:  grossly intact   Consciousness:  alert and awake    Attention: Intact concentration and attention span   Intellect: Considered to be at least of average intelligence   Insight:  limited in denial   Judgment: poor      Motor Activity: no abnormal movements Recent Labs:  Results Reviewed     None          I/O Past 24 hours:  No intake/output data recorded  No intake/output data recorded  Assessment / Plan:     Schizoaffective disorder, bipolar type ([address] )      Reason for continued inpatient care:  Because of underlying paranoia and refusal to go back to the personal care home and inability to care for herself on her own  Acceptance by patient:  Accepting  Marianne Littlejohn in recovery:  About living in another personal care home once she feels better  Understanding of medications :  Has some understanding  Involved in reintegration process:  Adjusting to the unit  Trusting in relatoinship with psychiatrist:  Trusting    Recommended Treatment:    Medication changes:  1) none today  Non-pharmacological treatments  1) start individual therapy group therapy, milieu therapy and occupational therapy and milieu therapy involving multidisciplinary team approach with psychotherapist, case management, nursing, peer support services, art therapist etc using recovery principles and psycho-education about accepting illness and need for treatment   3) check laps and encourage p o  Fluids because of low-grade fever  Safety  1) Safety/communication plan established targeting dynamic risk factors above  Discharge Plan most likely back to the a:  Personal care boarding home with act services once her delusions are managed and mood becomes more stabilized and she becomes more receptive to treatment compliance    Counseling / Coordination of Care    Total floor / unit time spent today 15 minutes  Greater than 50% of total time was spent with the patient and / or family counseling and / or coordination of care  A description of the counseling / coordination of care  Patient's Rights, confidentiality and exceptions to confidentiality, use of automated medical record, Jeb Patrick staff access to medical record, and consent to treatment reviewed      Faheem Daniels MD

## 2019-09-05 NOTE — PROGRESS NOTES
09/05/19 0900   Team Meeting   Meeting Type Daily Rounds   Team Members Present   Team Members Present Physician;Nurse;; Other (Discipline and Name)   Physician Team Member Dr Virginia Chow Team Member Victor Manuel Ceron RN   Care Management Team Member Brenda Pham   Other (Discipline and Name) DIANA Macario     Patient reported she was nauseous yesterday  Refused to shower  Continued to provide excuses for everything  Attended IMR and 3-11 groups  Slept

## 2019-09-05 NOTE — PROGRESS NOTES
Progress Note - Ama Turner 1955, 60 y o  female MRN: [id number]    [facility] Encounter: [id number]    Primary Care Provider: Maria Antonia Berger MD   Date and time admitted to hospital: 7/23/2019  5:30 PM        * Schizoaffective disorder, bipolar type [facility])  Assessment & Plan  Psychiatry Progress Note    Subjective: Interval History     Patient has been  refusing showers for several days in a row and has been wearing the same clothing but did start attending a few groups as of yesterday  She continues to verbalize her suspicion and mistrust about certain staff who gives her medications and gives her the inhaler  She still does not feel that safe on the unit because of the "attitude of the staff "and she continues to have several somatic complaints  She still complains of not  Breathing properly  despite breathing appropriately and having nasal oxygen on at night with acceptable pulse ox when checked by staff   She continues talk in a stilted manner as if talking in a court of law  She does not admit to any overt hallucinations or paranoia but still appears to harbor some covert  paranoia   She  still believes  that it is 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 and this has not changed since she came to the unit  She has chosen not to to use the portable oxygen to get out of bed and go to groups if necessary  She remains guarded suspicious evasive and in denial of her illness on an ongoing basis  No current suicidal homicidal thoughts intent or plans reported  No overt hallucinations or other delusions reported   No signs or sxs of agranulocytosis or myocarditis or endocarditis and she is moving her bowels so far with no issues  Her last EKG was acceptable from August 22nd 2019   She tends to his lay back on her bed most of the time rather than get up or attend groups other than for meals and medications off and on    Current medications:    Current Facility-Administered Medications:     acetaminophen (TYLENOL) tablet 650 mg, 650 mg, Oral, Q6H PRN, Ivonne Nevarez MD    albuterol (PROVENTIL HFA,VENTOLIN HFA) inhaler 2 puff, 2 puff, Inhalation, Q4H PRN, Ivonne Nevarez MD, 2 puff at 07/25/19 1200    aluminum-magnesium hydroxide-simethicone (MYLANTA) 200-200-20 mg/5 mL oral suspension 15 mL, 15 mL, Oral, Q4H PRN, Ivonne Nevarez MD    ammonium lactate (LAC-HYDRIN) 12 % lotion 1 application, 1 application, Topical, BID PRN, Ivonne Nevarez MD    benzonatate (TESSALON PERLES) capsule 100 mg, 100 mg, Oral, TID PRN, Ivonne Nevarez MD    benztropine (COGENTIN) injection 1 mg, 1 mg, Intramuscular, Q8H PRN, Ivonne Nevarez MD    cloZAPine (CLOZARIL) tablet 50 mg, 50 mg, Oral, TID, Ivonne Nevarez MD, 50 mg at 09/05/19 0830    EPINEPHrine PF (ADRENALIN) 1 mg/mL injection 0 15 mg, 0 15 mg, Intramuscular, Once PRN, Ivonne Nevarez MD    FLUoxetine (PROzac) capsule 10 mg, 10 mg, Oral, Daily, Ivonne Nevarez MD, 10 mg at 09/05/19 0830    fluticasone-vilanterol (BREO ELLIPTA) 200-25 MCG/INH inhaler 1 puff, 1 puff, Inhalation, Daily, Ivonne Nevarez MD, 1 puff at 09/05/19 0830    ketotifen (ZADITOR) 0 025 % ophthalmic solution 1 drop, 1 drop, Right Eye, BID PRN, Ivonne Nevarez MD    levothyroxine tablet 125 mcg, 125 mcg, Oral, Early Morning, Ivonne Nevarez MD, 125 mcg at 09/05/19 0897    magnesium hydroxide (MILK OF MAGNESIA) 400 mg/5 mL oral suspension 30 mL, 30 mL, Oral, Daily PRN, Ivonne Nevarez MD    montelukast (SINGULAIR) tablet 10 mg, 10 mg, Oral, HS, Ivonne Nevarez MD, 10 mg at 09/04/19 2132    OLANZapine (ZyPREXA) IM injection 5 mg, 5 mg, Intramuscular, Q8H PRN, Ivonne Nevarez MD    OLANZapine (ZyPREXA) tablet 5 mg, 5 mg, Oral, Q8H PRN, Ivonne Nevarez MD    pantoprazole (PROTONIX) EC tablet 40 mg, 40 mg, Oral, Early Morning, Ivonne Nevarez MD, 40 mg at 09/05/19 7621    polyethylene glycol (MIRALAX) packet 17 g, 17 g, Oral, Daily PRN, Ivonne Nevarez MD    polyvinyl alcohol (LIQUIFILM TEARS) 1 4 % ophthalmic solution 1 drop, 1 drop, Both Eyes, Q3H PRN, Vincent Olivares MD    theophylline (JEF-24) 24 hr capsule 200 mg, 200 mg, Oral, Daily, Vincent Olivares MD, 200 mg at 09/05/19 0830    tiotropium (SPIRIVA) capsule for inhaler 18 mcg, 18 mcg, Inhalation, Daily, Vincent Olivares MD, 18 mcg at 09/05/19 0830    traZODone (DESYREL) tablet 25 mg, 25 mg, Oral, HS PRN, Vincent Olivares MD  Justification if on more than two antipsychotics:  Only on his clozapine  Side effects if any:  None    Risks , benefits, side effects and precautions of medications discussed with patient and reminded patient to let us know any problems with medications     Objective:     Vital Signs:  Vitals:    09/04/19 1900 09/04/19 2130 09/05/19 0730 09/05/19 0732   BP: 114/72  140/79 96/68   BP Location: Left arm  Left arm Left arm   Pulse: 79  92 104   Resp: 17  17    Temp: 97 7 °F (36 5 °C)  98 4 °F (36 9 °C)    TempSrc: Temporal  Temporal    SpO2: 95% 94%     Weight:       Height:         Appearance:  age appropriate, casually dressed and overweight older than stated age   Behavior:  deviant, evasive and guarded and suspicious but with good eye contact   Speech:  normal pitch and normal volume but tends to become loud at times   Mood:  anxious and dysthymic   Affect:  mood-congruent   Thought Process:  goal directed and illogical slightly pressured but able to be redirected and talks as if in a court of low being stilted   Thought Content:  delusions  grandiose and somatic about not being able to breathe despite being on nasal oxygen and paranoid about people out to harm her and Atrovent inhaler tainteded with peanut oil  No current suicidal homicidal thoughts intent or plans reported    No phobias obsessions or compulsions reported   Perceptual Disturbances: None and does not appear responding to internal stimuli   Risk Potential: Tendency to not care for herself if she goes off treatment   Sensorium:  person, place, time/date, situation, day of week, month of year and time   Cognition:  grossly intact   Consciousness:  alert and awake    Attention: Intact concentration and attention span   Intellect: Considered to be at least of average intelligence   Insight:  limited in denial   Judgment: poor      Motor Activity: no abnormal movements         Recent Labs:  Results Reviewed     None          I/O Past 24 hours:  No intake/output data recorded  No intake/output data recorded  Assessment / Plan:     [facility]      Reason for continued inpatient care:  Because of underlying paranoia and refusal to go back to the personal care home and inability to care for herself on her own  Acceptance by patient:  Accepting  Quinn Velásquez in recovery:  About living in another personal care home once she feels better  Understanding of medications :  Has some understanding  Involved in reintegration process:  Adjusting to the unit  Trusting in relatoinship with psychiatrist:  Trusting    Recommended Treatment:    Medication changes:  1) none today  Non-pharmacological treatments  1) start individual therapy group therapy, milieu therapy and occupational therapy and milieu therapy involving multidisciplinary team approach with psychotherapist, case management, nursing, peer support services, art therapist etc using recovery principles and psycho-education about accepting illness and need for treatment   3) check laps and encourage p o  Fluids because of low-grade fever  Safety  1) Safety/communication plan established targeting dynamic risk factors above  Discharge Plan most likely back to the a:  Personal care boarding home with act services once her delusions are managed and mood becomes more stabilized and she becomes more receptive to treatment compliance    Counseling / Coordination of Care    Total floor / unit time spent today 15 minutes   Greater than 50% of total time was spent with the patient and / or family counseling and / or coordination of care  A description of the counseling / coordination of care  Patient's Rights, confidentiality and exceptions to confidentiality, use of automated medical record, [facility] Tacho Patrick staff access to medical record, and consent to treatment reviewed      Oval MD Joseluis

## 2019-09-05 NOTE — PLAN OF CARE
Problem: PAIN - ADULT  Goal: Verbalizes/displays adequate comfort level or baseline comfort level  Description  Interventions:  - Encourage patient to monitor pain and request assistance  - Assess pain using appropriate pain scale  - Administer analgesics based on type and severity of pain and evaluate response  - Implement non-pharmacological measures as appropriate and evaluate response  - Consider cultural and social influences on pain and pain management  - Notify physician/advanced practitioner if interventions unsuccessful or patient reports new pain  Outcome: Progressing     Problem: SAFETY ADULT  Goal: Patient will remain free of falls  Description  INTERVENTIONS:  - Assess patient frequently for physical needs  -  Identify cognitive and physical deficits and behaviors that affect risk of falls    -  [place] fall precautions as indicated by assessment   - Educate patient/family on patient safety including physical limitations  - Instruct patient to call for assistance with activity based on assessment  - Modify environment to reduce risk of injury  - Consider OT/PT consult to assist with strengthening/mobility  Outcome: Progressing     Problem: RESPIRATORY - ADULT  Goal: Achieves optimal ventilation and oxygenation  Description  INTERVENTIONS:  - Assess for changes in respiratory status  - Assess for changes in mentation and behavior  - Position to facilitate oxygenation and minimize respiratory effort  - Oxygen administration by appropriate delivery method based on oxygen saturation (per order) or ABGs  - Initiate smoking cessation education as indicated  - Encourage broncho-pulmonary hygiene including cough, deep breathe, Incentive Spirometry  - Assess the need for suctioning and aspirate as needed  - Assess and instruct to report SOB or any respiratory difficulty  - Respiratory Therapy support as indicated  Outcome: Benny Braxton maintained on ongoing fall, aspiration and SAFE precaution without incident on this shift   Laying in bed with her eyes closed, breath even and unlabored with o2 @1L/m with the humidification   No sign or symptom of respiratory distress noted  Alirio Santana continues to remain free from fall   Denies any pain or discomfort   Thus far she is in stabled condition   Q 15 minutes checks during the night continues  No behavioral noted    Will continue to monitor behavioral, respiratory, and sleep pattern

## 2019-09-06 LAB
BASOPHILS # BLD AUTO: 0.1 THOUSANDS/ΜL (ref 0–0.1)
BASOPHILS NFR BLD AUTO: 1 % (ref 0–1)
EOSINOPHIL # BLD AUTO: 0.1 THOUSAND/ΜL (ref 0–0.4)
EOSINOPHIL NFR BLD AUTO: 2 % (ref 0–6)
ERYTHROCYTE [DISTWIDTH] IN BLOOD BY AUTOMATED COUNT: 14.7 %
HCT VFR BLD AUTO: 41.7 % (ref 36–46)
HGB BLD-MCNC: 13.9 G/DL (ref 12–16)
LYMPHOCYTES # BLD AUTO: 2.2 THOUSANDS/ΜL (ref 0.5–4)
LYMPHOCYTES NFR BLD AUTO: 34 % (ref 25–45)
MCH RBC QN AUTO: 30.7 PG (ref 26–34)
MCHC RBC AUTO-ENTMCNC: 33.3 G/DL (ref 31–36)
MCV RBC AUTO: 92 FL (ref 80–100)
MONOCYTES # BLD AUTO: 0.5 THOUSAND/ΜL (ref 0.2–0.9)
MONOCYTES NFR BLD AUTO: 8 % (ref 1–10)
NEUTROPHILS # BLD AUTO: 3.5 THOUSANDS/ΜL (ref 1.8–7.8)
NEUTS SEG NFR BLD AUTO: 55 % (ref 45–65)
PLATELET # BLD AUTO: 294 THOUSANDS/UL (ref 150–450)
PMV BLD AUTO: 9.1 FL (ref 8.9–12.7)
RBC # BLD AUTO: 4.52 MILLION/UL (ref 4–5.2)
WBC # BLD AUTO: 6.3 THOUSAND/UL (ref 4.5–11)

## 2019-09-06 PROCEDURE — 99231 SBSQ HOSP IP/OBS SF/LOW 25: CPT | Performed by: PSYCHIATRY & NEUROLOGY

## 2019-09-06 PROCEDURE — 85025 COMPLETE CBC W/AUTO DIFF WBC: CPT | Performed by: PSYCHIATRY & NEUROLOGY

## 2019-09-06 RX ADMIN — CLOZAPINE 50 MG: 25 TABLET ORAL at 17:41

## 2019-09-06 RX ADMIN — CLOZAPINE 50 MG: 25 TABLET ORAL at 21:22

## 2019-09-06 RX ADMIN — MONTELUKAST SODIUM 10 MG: 10 TABLET, COATED ORAL at 21:22

## 2019-09-06 RX ADMIN — CLOZAPINE 50 MG: 25 TABLET ORAL at 08:31

## 2019-09-06 RX ADMIN — LEVOTHYROXINE SODIUM 125 MCG: 125 TABLET ORAL at 05:45

## 2019-09-06 RX ADMIN — FLUOXETINE 10 MG: 10 CAPSULE ORAL at 08:31

## 2019-09-06 RX ADMIN — TIOTROPIUM BROMIDE 18 MCG: 18 CAPSULE ORAL; RESPIRATORY (INHALATION) at 08:31

## 2019-09-06 RX ADMIN — FLUTICASONE FUROATE AND VILANTEROL TRIFENATATE 1 PUFF: 200; 25 POWDER RESPIRATORY (INHALATION) at 08:31

## 2019-09-06 RX ADMIN — THEOPHYLLINE ANHYDROUS 200 MG: 200 CAPSULE, EXTENDED RELEASE ORAL at 08:31

## 2019-09-06 RX ADMIN — PANTOPRAZOLE SODIUM 40 MG: 40 TABLET, DELAYED RELEASE ORAL at 05:45

## 2019-09-06 NOTE — PROGRESS NOTES
09/06/19 0900   Team Meeting   Meeting Type Daily Rounds   Team Members Present   Team Members Present Physician;Nurse;; Other (Discipline and Name)   Physician Team Member Dr Darrin Khan Team Member Arturo Botello RN   Care Management Team Member Brenda Reyes   Other (Discipline and Name) Madeline Vance [place]; SHANIKA Philippe     Patient did shower yesterday but with much resistance  Slept

## 2019-09-06 NOTE — PROGRESS NOTES
Progress Note - Violetta Villasenor 1955, 60 y o  female MRN: [id number]    Unit/Bed#: [facility] [address] Encounter: [id number]    Primary Care Provider: Bandar Mccall MD   Date and time admitted to hospital: 7/23/2019  5:30 PM        * Schizoaffective disorder, bipolar type [facility])  Assessment & Plan  Psychiatry Progress Note    Subjective: Interval History     Patient attended only 1 group yesterday and claims to have taken a shower but staff reports that she only took a partial shower  She is trying to justify her behaviors and still appears somewhat suspicious and claims that she does not feel quite safe on the unit  She continues to verbalize her suspicion and mistrust about certain staff who gives her medications and gives her the inhaler  She still complains of not  Breathing properly  despite breathing appropriately and having nasal oxygen on at night with acceptable pulse ox when checked by staff   She continues talk in a stilted manner as if talking in a court of law and this has not changed either since she came to the unit  She does not admit to any overt hallucinations or paranoia but still appears to harbor some covert  paranoia based on her demeanor and interaction  She  still believes  that there  is 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 and this has not changed since she came to the unit  She has chosen not to to use the portable oxygen to get out of bed and go to groups if necessary  She remains guarded suspicious evasive and in denial of her illness on an ongoing basis  No current suicidal homicidal thoughts intent or plans reported  No overt hallucinations or other delusions reported   No signs or sxs of agranulocytosis or myocarditis or endocarditis and she is moving her bowels so far with no issues    She tends to his lay back on her bed most of the time rather than get up or attend groups other than for meals and medications off and on and was again reminded to cooperate with the recovery plan we can prepare her for a personal care boarding home again  She is somewhat passive-aggressive claiming that she will attend but actually does not and lays back on her bed most of the day       Current medications:    Current Facility-Administered Medications:     acetaminophen (TYLENOL) tablet 650 mg, 650 mg, Oral, Q6H PRN, Jerome Lopez MD    albuterol (PROVENTIL HFA,VENTOLIN HFA) inhaler 2 puff, 2 puff, Inhalation, Q4H PRN, Jerome Lopez MD, 2 puff at 07/25/19 1200    aluminum-magnesium hydroxide-simethicone (MYLANTA) 200-200-20 mg/5 mL oral suspension 15 mL, 15 mL, Oral, Q4H PRN, Jerome Lopez MD    ammonium lactate (LAC-HYDRIN) 12 % lotion 1 application, 1 application, Topical, BID PRN, Jerome Lopez MD    benzonatate (TESSALON PERLES) capsule 100 mg, 100 mg, Oral, TID PRN, Jerome Lopez MD    benztropine (COGENTIN) injection 1 mg, 1 mg, Intramuscular, Q8H PRN, Jerome Lopez MD    cloZAPine (CLOZARIL) tablet 50 mg, 50 mg, Oral, TID, Jerome Lopez MD, 50 mg at 09/06/19 0831    EPINEPHrine PF (ADRENALIN) 1 mg/mL injection 0 15 mg, 0 15 mg, Intramuscular, Once PRN, Jerome Lopez MD    FLUoxetine (PROzac) capsule 10 mg, 10 mg, Oral, Daily, Jerome Lopez MD, 10 mg at 09/06/19 0831    fluticasone-vilanterol (BREO ELLIPTA) 200-25 MCG/INH inhaler 1 puff, 1 puff, Inhalation, Daily, Jerome Lopez MD, 1 puff at 09/06/19 0831    ketotifen (ZADITOR) 0 025 % ophthalmic solution 1 drop, 1 drop, Right Eye, BID PRN, Jerome Lopez MD    levothyroxine tablet 125 mcg, 125 mcg, Oral, Early Morning, Jerome Lopez MD, 125 mcg at 09/06/19 0545    magnesium hydroxide (MILK OF MAGNESIA) 400 mg/5 mL oral suspension 30 mL, 30 mL, Oral, Daily PRN, Jerome Lopez MD    montelukast (SINGULAIR) tablet 10 mg, 10 mg, Oral, HS, Jerome Lopez MD, 10 mg at 09/05/19 2129    OLANZapine (ZyPREXA) IM injection 5 mg, 5 mg, Intramuscular, Q8H PRN, Jerome Lopez MD    OLANZapine (ZyPREXA) tablet 5 mg, 5 mg, Oral, Q8H PRN, Shi Pham MD    pantoprazole (PROTONIX) EC tablet 40 mg, 40 mg, Oral, Early Morning, Shi Pham MD, 40 mg at 09/06/19 0545    polyethylene glycol (MIRALAX) packet 17 g, 17 g, Oral, Daily PRN, Shi Pham MD    polyvinyl alcohol (LIQUIFILM TEARS) 1 4 % ophthalmic solution 1 drop, 1 drop, Both Eyes, Q3H PRN, Shi Pham MD    theophylline (JEF-24) 24 hr capsule 200 mg, 200 mg, Oral, Daily, Shi Pham MD, 200 mg at 09/06/19 0831    tiotropium (SPIRIVA) capsule for inhaler 18 mcg, 18 mcg, Inhalation, Daily, Shi Pham MD, 18 mcg at 09/06/19 0831    traZODone (DESYREL) tablet 25 mg, 25 mg, Oral, HS PRN, Shi Pham MD  Justification if on more than two antipsychotics:  Only on his clozapine  Side effects if any:  None    Risks , benefits, side effects and precautions of medications discussed with patient and reminded patient to let us know any problems with medications     Objective:     Vital Signs:  Vitals:    09/05/19 2005 09/05/19 2135 09/06/19 0730 09/06/19 0732   BP: 110/62  115/75 110/71   BP Location: Left arm  Left arm Left arm   Pulse: 93  (!) 107 (!) 107   Resp: 18  20    Temp: 97 6 °F (36 4 °C)  98 1 °F (36 7 °C)    TempSrc: Temporal  Temporal    SpO2: 98% 95%     Weight:       Height:         Appearance:  age appropriate, casually dressed and overweight older than stated age   Behavior:  deviant, evasive and guarded and suspicious but with good eye contact   Speech:  normal pitch and normal volume but tends to become loud at times   Mood:  anxious and dysthymic   Affect:  mood-congruent   Thought Process:  goal directed and illogical slightly pressured but able to be redirected and talks as if in a court of low being stilted   Thought Content:  delusions  grandiose and somatic about not being able to breathe despite being on nasal oxygen and paranoid about people out to harm her and Atrovent inhaler tainteded with peanut oil    No current suicidal homicidal thoughts intent or plans reported  No phobias obsessions or compulsions reported   Perceptual Disturbances: None and does not appear responding to internal stimuli   Risk Potential: Tendency to not care for herself if she goes off treatment   Sensorium:  person, place, time/date, situation, day of week, month of year and time   Cognition:  grossly intact   Consciousness:  alert and awake    Attention: Intact concentration and attention span   Intellect: Considered to be at least of average intelligence   Insight:  limited in denial   Judgment: poor      Motor Activity: no abnormal movements         Recent Labs:  Results Reviewed     None          I/O Past 24 hours:  No intake/output data recorded  No intake/output data recorded  Assessment / Plan:     Schizoaffective disorder, bipolar type ([facility] )      Reason for continued inpatient care:  Because of underlying paranoia and refusal to go back to the personal care home and inability to care for herself on her own  Acceptance by patient:  Accepting  Juan Gonzalez in recovery:  About living in another personal care home once she feels better  Understanding of medications :  Has some understanding  Involved in reintegration process:  Adjusting to the unit  Trusting in relatoinship with psychiatrist:  Trusting    Recommended Treatment:    Medication changes:  1) none today  Non-pharmacological treatments  1) start individual therapy group therapy, milieu therapy and occupational therapy and milieu therapy involving multidisciplinary team approach with psychotherapist, case management, nursing, peer support services, art therapist etc using recovery principles and psycho-education about accepting illness and need for treatment   3) check laps and encourage p o  Fluids because of low-grade fever  Safety  1) Safety/communication plan established targeting dynamic risk factors above    Discharge Plan most likely back to the a:  Personal care boarding home with act services once her delusions are managed and mood becomes more stabilized and she becomes more receptive to treatment compliance    Counseling / Coordination of Care    Total floor / unit time spent today 15 minutes  Greater than 50% of total time was spent with the patient and / or family counseling and / or coordination of care  A description of the counseling / coordination of care  Patient's Rights, confidentiality and exceptions to confidentiality, use of automated medical record, Jeb Patrick staff access to medical record, and consent to treatment reviewed      Vincent Olivares MD

## 2019-09-06 NOTE — PLAN OF CARE
Problem: Alteration in Thoughts and Perception  Goal: Treatment Goal: Gain control of psychotic behaviors/thinking, reduce/eliminate presenting symptoms and demonstrate improved reality functioning upon discharge  Outcome: Progressing  Goal: Verbalize thoughts and feelings  Description  Interventions:  - Promote a nonjudgmental and trusting relationship with the patient through active listening and therapeutic communication  - Assess patient's level of functioning, behavior and potential for risk  - Engage patient in 1 on 1 interactions for a minimum of 15 minutes each session  - Encourage patient to express fears, feelings, frustrations, and discuss symptoms    - [place] patient to reality, help patient recognize reality-based thinking   - Administer medications as ordered and assess for potential side effects  - Provide the patient education related to the signs and symptoms of the illness and desired effects of prescribed medications  Outcome: Progressing  Goal: Agree to be compliant with medication regime, as prescribed and report medication side effects  Description  Interventions:  - Offer appropriate PRN medication and supervise ingestion; conduct aims, as needed   Outcome: Progressing  Goal: Attend and participate in unit activities, including therapeutic, recreational, and educational groups  Description  Interventions:  - Provide therapeutic and educational activities daily, encourage attendance and participation, and document same in the medical record     CERTIFIED PEER SPECIALIST INTERVENTIONS:    Complete peer assessment with patient to assess their needs and identify their goals to complete while in the recovery program as well as once discharged into the community  Patient will complete WRAP Plan, Crisis Plan and 5 Life Domains  Patient will attend 50% of groups offered on the unit  Patient will complete a goal card weekly      Outcome: Progressing  Goal: Recognize dysfunctional thoughts, communicate reality-based thoughts at the time of discharge  Description  Interventions:  - Provide medication and psycho-education to assist patient in compliance and developing insight into his/her illness   Outcome: Progressing  Goal: Complete daily ADLs, including personal hygiene independently, as able  Description  Interventions:  - Observe, teach, and assist patient with ADLS  - Monitor and promote a balance of rest/activity, with adequate nutrition and elimination   Outcome: Progressing     Problem: Ineffective Coping  Goal: Identifies ineffective coping skills  Outcome: Progressing  Goal: Identifies healthy coping skills  Outcome: Progressing  Goal: Demonstrates healthy coping skills  Outcome: Progressing  Goal: Participates in unit activities  Description  Interventions:  - Provide therapeutic environment   - Provide required programming   - Redirect inappropriate behaviors   Outcome: Progressing  Goal: Patient/Family participate in treatment and DC plans  Description  Interventions:  - Provide therapeutic environment  Outcome: Progressing  Goal: Patient/Family verbalizes awareness of resources  Outcome: Progressing  Goal: Understands least restrictive measures  Description  Interventions:  - Utilize least restrictive behavior  Outcome: Progressing     Problem: Risk for Self Injury/Neglect  Goal: Treatment Goal: Remain safe during length of stay, learn and adopt new coping skills, and be free of self-injurious ideation, impulses and acts at the time of discharge  Outcome: Progressing  Goal: Verbalize thoughts and feelings  Description  Interventions:  - Assess and re-assess patient's lethality and potential for self-injury  - Engage patient in 1:1 interactions, daily, for a minimum of 15 minutes  - Encourage patient to express feelings, fears, frustrations, hopes  - Establish rapport/trust with patient   Outcome: Progressing  Goal: Refrain from harming self  Description  Interventions:  - Monitor patient closely, per order  - Develop a trusting relationship  - Supervise medication ingestion, monitor effects and side effects   Outcome: Progressing  Goal: Attend and participate in unit activities, including therapeutic, recreational, and educational groups  Description  Interventions:  - Provide therapeutic and educational activities daily, encourage attendance and participation, and document same in the medical record  - Obtain collateral information, encourage visitation and family involvement in care   Outcome: Progressing  Goal: Recognize maladaptive responses and adopt new coping mechanisms  Outcome: Progressing  Goal: Complete daily ADLs, including personal hygiene independently, as able  Description  Interventions:  - Observe, teach, and assist patient with ADLS  - Monitor and promote a balance of rest/activity, with adequate nutrition and elimination  Outcome: Progressing     Problem: Depression  Goal: Treatment Goal: Demonstrate behavioral control of depressive symptoms, verbalize feelings of improved mood/affect, and adopt new coping skills prior to discharge  Outcome: Progressing  Goal: Verbalize thoughts and feelings  Description  Interventions:  - Assess and re-assess patient's level of risk   - Engage patient in 1:1 interactions, daily, for a minimum of 15 minutes   - Encourage patient to express feelings, fears, frustrations, hopes   Outcome: Progressing  Goal: Refrain from isolation  Description  Interventions:  - Develop a trusting relationship   - Encourage socialization   Outcome: Progressing  Goal: Refrain from self-neglect  Outcome: Progressing     Problem: Anxiety  Goal: Anxiety is at manageable level  Description  Interventions:  - Assess and monitor patient's anxiety level  - Monitor for signs and symptoms of anxiety both physical and emotional (heart palpitations, chest pain, shortness of breath, headaches, nausea, feeling jumpy, restlessness, irritable, apprehensive)     - Collaborate with interdisciplinary team and initiate plan and interventions as ordered  - [place] patient to unit/surroundings  - Explain treatment plan  - Encourage participation in care  - Encourage verbalization of concerns/fears  - Identify coping mechanisms  - Assist in developing anxiety-reducing skills  - Administer/offer alternative therapies  - Limit or eliminate stimulants  Outcome: Progressing     Problem: Alteration in Orientation  Goal: Interact with staff daily  Description  Interventions:  - Assess and re-assess patient's level of orientation  - Engage patient in 1 on 1 interactions, daily, for a minimum of 15 minutes   - Establish rapport/trust with patient   Outcome: Progressing  Goal: Cooperate with recommended testing/procedures  Description  Interventions:  - Determine need for ancillary testing  - Observe for mental status changes  - Implement falls/precaution protocol   Outcome: Progressing     Problem: PAIN - ADULT  Goal: Verbalizes/displays adequate comfort level or baseline comfort level  Description  Interventions:  - Encourage patient to monitor pain and request assistance  - Assess pain using appropriate pain scale  - Administer analgesics based on type and severity of pain and evaluate response  - Implement non-pharmacological measures as appropriate and evaluate response  - Consider cultural and social influences on pain and pain management  - Notify physician/advanced practitioner if interventions unsuccessful or patient reports new pain  Outcome: Progressing     Problem: SAFETY ADULT  Goal: Patient will remain free of falls  Description  INTERVENTIONS:  - Assess patient frequently for physical needs  -  Identify cognitive and physical deficits and behaviors that affect risk of falls    -  [place] fall precautions as indicated by assessment   - Educate patient/family on patient safety including physical limitations  - Instruct patient to call for assistance with activity based on assessment  - Modify environment to reduce risk of injury  - Consider OT/PT consult to assist with strengthening/mobility  Outcome: Progressing     Problem: RESPIRATORY - ADULT  Goal: Achieves optimal ventilation and oxygenation  Description  INTERVENTIONS:  - Assess for changes in respiratory status  - Assess for changes in mentation and behavior  - Position to facilitate oxygenation and minimize respiratory effort  - Oxygen administration by appropriate delivery method based on oxygen saturation (per order) or ABGs  - Initiate smoking cessation education as indicated  - Encourage broncho-pulmonary hygiene including cough, deep breathe, Incentive Spirometry  - Assess the need for suctioning and aspirate as needed  - Assess and instruct to report SOB or any respiratory difficulty  - Respiratory Therapy support as indicated  Outcome: Progressing     Problem: DISCHARGE PLANNING  Goal: Discharge to home or other facility with appropriate resources  Description  INTERVENTIONS:  - Conduct assessment to determine patient/family and health care team treatment goals, and need for post-acute services based on payer coverage, community resources, and patient preferences, and barriers to discharge  - Address psychosocial, clinical, and financial barriers to discharge as identified in assessment in conjunction with the patient/family and health care team  - Assist the patient in reintegration back into the community by removing barriers which may hinder a successful discharge once deemed stable  - Arrange appropriate level of post-acute services according to patient's needs and preference and payer coverage in collaboration with the physician and health care team  - Communicate with and update the patient/family, physician, and health care team regarding progress on the discharge plan  - Arrange appropriate transportation to post-acute venues    Outcome: Progressing    9/6/19 Shift 7-3  BM-9/5  Shower 9/5 did not wash hair    Attended IMR  Visible for meds and meals  Pleasant and cooperative  Compliant with incentive spirometer x1  Isolative to room and bed throughout shift  No c/o s/s pain, discomfort or distress    Denies depression or anxiety, SI or HI     CBC drawn this AM = ANC 3 5

## 2019-09-06 NOTE — PLAN OF CARE
Problem: Alteration in Thoughts and Perception  Goal: Verbalize thoughts and feelings  Description  Interventions:  - Promote a nonjudgmental and trusting relationship with the patient through active listening and therapeutic communication  - Assess patient's level of functioning, behavior and potential for risk  - Engage patient in 1 on 1 interactions for a minimum of 15 minutes each session  - Encourage patient to express fears, feelings, frustrations, and discuss symptoms    - [place] patient to reality, help patient recognize reality-based thinking   - Administer medications as ordered and assess for potential side effects  - Provide the patient education related to the signs and symptoms of the illness and desired effects of prescribed medications  Outcome: Progressing  Goal: Agree to be compliant with medication regime, as prescribed and report medication side effects  Description  Interventions:  - Offer appropriate PRN medication and supervise ingestion; conduct aims, as needed   Outcome: Progressing  Goal: Attend and participate in unit activities, including therapeutic, recreational, and educational groups  Description  Interventions:  - Provide therapeutic and educational activities daily, encourage attendance and participation, and document same in the medical record     CERTIFIED PEER SPECIALIST INTERVENTIONS:    Complete peer assessment with patient to assess their needs and identify their goals to complete while in the recovery program as well as once discharged into the community  Patient will complete WRAP Plan, Crisis Plan and 5 Life Domains  Patient will attend 50% of groups offered on the unit  Patient will complete a goal card weekly      Outcome: Progressing     Problem: Depression  Goal: Refrain from isolation  Description  Interventions:  - Develop a trusting relationship   - Encourage socialization   Outcome: Not Progressing     [address] is pleasant, but, isolates in room in bed in free time  Did come out for meal, eating only 25% as said disliked part of it & couldn't chew other parts (a house tray as hadn't done menu; was reminded to do so in future so gets what she wants)  Has come to window for scheduled medicines  Took part in Women's Group, but, did not attend PM Group  Had a substantial HS snack  Verbalizes that [facility] during [facility] Team told her the bed @ [facility] facility still held for her, may also have option @ another facility of their's in [address] [place] [address] is motivated for discharge & will make the effort to attend @ least 2 groups per day, to shower regularly to demonstrate can function  Has used her Incentive Spirometer reaching volumes as high as 1250ml, demonstrating continued improvement  Wearing her humidified nasal O2 @ 1L now for bed

## 2019-09-06 NOTE — PROGRESS NOTES
Pharmacy Clozapine Monitoring Progress Note     Violetta Villasenor is receiving a total daily dose of 150 mg of clozapine and receiving it as 50mg three times daily  Pharmacist Recommendations Based on Assessment and Plan (below):    1  Patient is Clozapine-REMS eligible, ANC was updated, with weekly monitoring frequency and the next [address] is due on 9/13/2019     2  Recommend repeat EKG for myocarditis monitoring  3  Recommend prophylactic bowel regimen  Assessment/Plan:    Phase of Treatment:     Current phase of treatment is initation/titration  Patient Clozapine REMS Eligibility:     Patient is eligible to receive clozapine and the [address] monitoring frequency is weekly per clozapine REMS  The patient's latest [address] value has been updated into the Clozapine-REMS program          ANC and Clozapine Level (if available)     The most recent [address] was   Lab Results   Component Value Date    NEUTROABS 3 50 09/06/2019    and the next lab is due on 9/13/19  Last Clozapine level (if available):    0   Lab Value Date/Time    CLOZAPINE 324 (L) 08/16/2019 1131     0   Lab Value Date/Time    NCLOZIP 207 08/16/2019 1131           Monitoring Parameters for Clozapine:     Clozapine Adverse Effect Suggested Monitoring Patient's Current Status    Agranulocytosis ANC baseline and then repeat weekly for first 6 months and every 2 weeks for the second 6 months  Maintenance after one year of therapy every month  The most recent [address] was   Lab Results   Component Value Date    NEUTROABS 3 50 09/06/2019       This ANC is considered normal range (ANC >/= 1500/mcL)  Continue current treatment and monitoring course  Respiratory Depression Please ensure patient is not on concomitant respiratory lowering medications such as benzodiazepines, sedative hypnotics (eg  zolpidem) This patient is not on respiratory depression lowering medications   Myocarditis Baseline and weekly EKG, CRP, BNP, and troponin    Weekly symptomatic complaints of fatigue, dyspnea, tachycardia, chest pain, palpitations, and fever for the first 4 weeks  Last EKG Results on  8/21/19 showed:  T wave abnormalities    Last QTC value was:  0   Lab Value Date/Time    QTCINT 427 08/21/2019 1133       Last BNP was: No results found for: NTBNP    Last Troponin was:  0   Lab Value Date/Time    TROPONINI <0 01 05/01/2019 1318    TROPONINI <0 04 05/10/2015 1948        Orthostatic hypotension/  bradycardia Blood pressure and vital signs      Monitor blood pressure and orthostatic hypotension at least twice daily upon initiation and continue to follow at least once daily afterwards  Patient's last recorded vital signs:       /71 (BP Location: Left arm)   Pulse (!) 107   Temp 98 1 °F (36 7 °C) (Temporal)   Resp 20   Ht 5' 4" (1 626 m)   Wt 68 2 kg (150 lb 6 4 oz)   SpO2 95% Comment: Pre application of QHS humidified nasal O2 @ 1L  BMI 25 82 kg/m²             Constipation (bowel obstruction due to high anticholinergic clozapine burden) Assess at baseline and weekly during first four months of therapy  Docusate 100mg BID and Miralax 17 grams daily recommended initially  Prophylactic bowel regimen not ordered and it is recommended to order a standing bowel regimen to reduce risk of bowel obstruction associated with clozapine therapy  Sialorrhea Assess at baseline and weekly during first four months of therapy  May be managed using sublingual anticholinergic preparations (atropine 1% eye drops)  Patient is not experiencing sialorrhea  This will continue to be monitored  Please note that per current REMS protocol the provider can submit a treatment rationale that justifies clozapine treatment even if patient parameters are outside of REMS recommendations  The Clozapine REMS is an FDA-mandated program implemented by the manufacturers of clozapine  ([employer]  com/CpmgClozapineUI/home  u)            Pharmacy will continue to follow patient with team, thank you    Electronically signed by: Shan Bender, PharmD, [id number], Clinical Pharmacist - Psychiatry

## 2019-09-06 NOTE — PLAN OF CARE
Problem: Depression  Goal: Refrain from isolation  Description  Interventions:  - Develop a trusting relationship   - Encourage socialization   Outcome: Progressing    Psychotherapy note:    Therapist met with patient to discuss Behavioral Management Plan to address isolative behavior and poor participation on the unit  Patient was able to identify that her physical issues and anxiety are the main reason why she stays in her room so often  She agrees with the target behaviors of staying out of her room for at least one hour after each meal (for socialization and ambulation), attending at least 25% of groups, and showering every other day  With regard to the shower, patient reports that she has been offered the disabled shower room, but declined because her issue is with the shower chair  Patient identifies her motivators as community integration privileges, especially a pass  Patient was cooperative and insightful during initiation of behavioral plan  Treatment team will follow patient's progress towards target behaviors

## 2019-09-06 NOTE — ASSESSMENT & PLAN NOTE
Psychiatry Progress Note    Subjective: Interval History     Patient attended only 1 group yesterday and claims to have taken a shower but staff reports that she only took a partial shower  She is trying to justify her behaviors and still appears somewhat suspicious and claims that she does not feel quite safe on the unit  She continues to verbalize her suspicion and mistrust about certain staff who gives her medications and gives her the inhaler  She still complains of not  Breathing properly  despite breathing appropriately and having nasal oxygen on at night with acceptable pulse ox when checked by staff   She continues talk in a stilted manner as if talking in a court of law and this has not changed either since she came to the unit  She does not admit to any overt hallucinations or paranoia but still appears to harbor some covert  paranoia based on her demeanor and interaction  She  still believes  that there  is 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 and this has not changed since she came to the unit  She has chosen not to to use the portable oxygen to get out of bed and go to groups if necessary  She remains guarded suspicious evasive and in denial of her illness on an ongoing basis  No current suicidal homicidal thoughts intent or plans reported  No overt hallucinations or other delusions reported   No signs or sxs of agranulocytosis or myocarditis or endocarditis and she is moving her bowels so far with no issues    She tends to his lay back on her bed most of the time rather than get up or attend groups other than for meals and medications off and on and was again reminded to cooperate with the recovery plan we can prepare her for a personal care boarding home again  She is somewhat passive-aggressive claiming that she will attend but actually does not and lays back on her bed most of the day       Current medications:    Current Facility-Administered Medications:    acetaminophen (TYLENOL) tablet 650 mg, 650 mg, Oral, Q6H PRN, Zac Sierra MD    albuterol (PROVENTIL HFA,VENTOLIN HFA) inhaler 2 puff, 2 puff, Inhalation, Q4H PRN, Zac Sierra MD, 2 puff at 07/25/19 1200    aluminum-magnesium hydroxide-simethicone (MYLANTA) 200-200-20 mg/5 mL oral suspension 15 mL, 15 mL, Oral, Q4H PRN, Zac Sierra MD    ammonium lactate (LAC-HYDRIN) 12 % lotion 1 application, 1 application, Topical, BID PRN, Zac Sierra MD    benzonatate (TESSALON PERLES) capsule 100 mg, 100 mg, Oral, TID PRN, Zac Sierra MD    benztropine (COGENTIN) injection 1 mg, 1 mg, Intramuscular, Q8H PRN, Zac Sierra MD    cloZAPine (CLOZARIL) tablet 50 mg, 50 mg, Oral, TID, Zac Sierra MD, 50 mg at 09/06/19 0831    EPINEPHrine PF (ADRENALIN) 1 mg/mL injection 0 15 mg, 0 15 mg, Intramuscular, Once PRN, Zac Sierra MD    FLUoxetine (PROzac) capsule 10 mg, 10 mg, Oral, Daily, Zac Sierra MD, 10 mg at 09/06/19 0831    fluticasone-vilanterol (BREO ELLIPTA) 200-25 MCG/INH inhaler 1 puff, 1 puff, Inhalation, Daily, Zac Sierra MD, 1 puff at 09/06/19 0831    ketotifen (ZADITOR) 0 025 % ophthalmic solution 1 drop, 1 drop, Right Eye, BID PRN, Zac Sierra MD    levothyroxine tablet 125 mcg, 125 mcg, Oral, Early Morning, Zac Sierra MD, 125 mcg at 09/06/19 0545    magnesium hydroxide (MILK OF MAGNESIA) 400 mg/5 mL oral suspension 30 mL, 30 mL, Oral, Daily PRN, Zac Sierra MD    montelukast (SINGULAIR) tablet 10 mg, 10 mg, Oral, HS, Zac Sierra MD, 10 mg at 09/05/19 2129    OLANZapine (ZyPREXA) IM injection 5 mg, 5 mg, Intramuscular, Q8H PRN, Zac Sierra MD    OLANZapine (ZyPREXA) tablet 5 mg, 5 mg, Oral, Q8H PRN, Zac Sierra MD    pantoprazole (PROTONIX) EC tablet 40 mg, 40 mg, Oral, Early Morning, Zac Sierra MD, 40 mg at 09/06/19 0545    polyethylene glycol (MIRALAX) packet 17 g, 17 g, Oral, Daily PRN, Zac Sierra MD    polyvinyl alcohol (LIQUIFILM TEARS) 1 4 % ophthalmic solution 1 drop, 1 drop, Both Eyes, Q3H PRN, Mynor Terry MD    theophylline (JEF-24) 24 hr capsule 200 mg, 200 mg, Oral, Daily, Mynor Terry MD, 200 mg at 09/06/19 0831    tiotropium [facility]) capsule for inhaler 18 mcg, 18 mcg, Inhalation, Daily, Mynor Terry MD, 18 mcg at 09/06/19 0831    traZODone (DESYREL) tablet 25 mg, 25 mg, Oral, HS PRN, Mynor Terry MD  Justification if on more than two antipsychotics:  Only on his clozapine  Side effects if any:  None    Risks , benefits, side effects and precautions of medications discussed with patient and reminded patient to let us know any problems with medications     Objective:     Vital Signs:  Vitals:    09/05/19 2005 09/05/19 2135 09/06/19 0730 09/06/19 0732   BP: 110/62  115/75 110/71   BP Location: Left arm  Left arm Left arm   Pulse: 93  (!) 107 (!) 107   Resp: 18  20    Temp: 97 6 °F (36 4 °C)  98 1 °F (36 7 °C)    TempSrc: Temporal  Temporal    SpO2: 98% 95%     Weight:       Height:         Appearance:  age appropriate, casually dressed and overweight older than stated age   Behavior:  deviant, evasive and guarded and suspicious but with good eye contact   Speech:  normal pitch and normal volume but tends to become loud at times   Mood:  anxious and dysthymic   Affect:  mood-congruent   Thought Process:  goal directed and illogical slightly pressured but able to be redirected and talks as if in a court of low being stilted   Thought Content:  delusions  grandiose and somatic about not being able to breathe despite being on nasal oxygen and paranoid about people out to harm her and Atrovent inhaler tainteded with peanut oil  No current suicidal homicidal thoughts intent or plans reported    No phobias obsessions or compulsions reported   Perceptual Disturbances: None and does not appear responding to internal stimuli   Risk Potential: Tendency to not care for herself if she goes off treatment   Sensorium:  person, place, time/date, situation, day of week, month of year and time Cognition:  grossly intact   Consciousness:  alert and awake    Attention: Intact concentration and attention span   Intellect: Considered to be at least of average intelligence   Insight:  limited in denial   Judgment: poor      Motor Activity: no abnormal movements         Recent Labs:  Results Reviewed     None          I/O Past 24 hours:  No intake/output data recorded  No intake/output data recorded  Assessment / Plan:     Schizoaffective disorder, bipolar type ([facility] [address] )      Reason for continued inpatient care:  Because of underlying paranoia and refusal to go back to the personal care home and inability to care for herself on her own  Acceptance by patient:  Accepting  Jenn Calhoun in recovery:  About living in another personal care home once she feels better  Understanding of medications :  Has some understanding  Involved in reintegration process:  Adjusting to the unit  Trusting in relatoinship with psychiatrist:  Trusting    Recommended Treatment:    Medication changes:  1) none today  Non-pharmacological treatments  1) start individual therapy group therapy, milieu therapy and occupational therapy and milieu therapy involving multidisciplinary team approach with psychotherapist, case management, nursing, peer support services, art therapist etc using recovery principles and psycho-education about accepting illness and need for treatment   3) check laps and encourage p o  Fluids because of low-grade fever  Safety  1) Safety/communication plan established targeting dynamic risk factors above  Discharge Plan most likely back to the a:  Personal care boarding home with act services once her delusions are managed and mood becomes more stabilized and she becomes more receptive to treatment compliance    Counseling / Coordination of Care    Total floor / unit time spent today 15 minutes  Greater than 50% of total time was spent with the patient and / or family counseling and / or coordination of care   A description of the counseling / coordination of care  Patient's Rights, confidentiality and exceptions to confidentiality, use of automated medical record, [facility] staff access to medical record, and consent to treatment reviewed      Sarah Hanna MD

## 2019-09-06 NOTE — PLAN OF CARE
Problem: PAIN - ADULT  Goal: Verbalizes/displays adequate comfort level or baseline comfort level  Description  Interventions:  - Encourage patient to monitor pain and request assistance  - Assess pain using appropriate pain scale  - Administer analgesics based on type and severity of pain and evaluate response  - Implement non-pharmacological measures as appropriate and evaluate response  - Consider cultural and social influences on pain and pain management  - Notify physician/advanced practitioner if interventions unsuccessful or patient reports new pain  Outcome: Progressing     Problem: SAFETY ADULT  Goal: Patient will remain free of falls  Description  INTERVENTIONS:  - Assess patient frequently for physical needs  -  Identify cognitive and physical deficits and behaviors that affect risk of falls    -  Altmar fall precautions as indicated by assessment   - Educate patient/family on patient safety including physical limitations  - Instruct patient to call for assistance with activity based on assessment  - Modify environment to reduce risk of injury  - Consider OT/PT consult to assist with strengthening/mobility  Outcome: Progressing     Problem: RESPIRATORY - ADULT  Goal: Achieves optimal ventilation and oxygenation  Description  INTERVENTIONS:  - Assess for changes in respiratory status  - Assess for changes in mentation and behavior  - Position to facilitate oxygenation and minimize respiratory effort  - Oxygen administration by appropriate delivery method based on oxygen saturation (per order) or ABGs  - Initiate smoking cessation education as indicated  - Encourage broncho-pulmonary hygiene including cough, deep breathe, Incentive Spirometry  - Assess the need for suctioning and aspirate as needed  - Assess and instruct to report SOB or any respiratory difficulty  - Respiratory Therapy support as indicated  Outcome: Progressing    ~Maggie maintained on ongoing fall, aspiration and SAFE precaution without incident on this shift   Laying in bed with her eyes closed, breath even and unlabored with o2 @1L/m with the humidification   No sign or symptom of respiratory distress noted  Yomaira Ospina continues to remain free from fall   Denies any pain or discomfort   Thus far she is in stabled condition   Q 15 minutes checks during the night continues   No behavioral noted    Will continue to monitor behavioral, respiratory, and sleep pattern   ~Maggie has a scheduled lab to be obtain this morning: CBC w/Diff

## 2019-09-06 NOTE — PROGRESS NOTES
09/06/19 1105   Activity/Group Checklist   Group Other (Comment)  (IMR - Anxiety/Panic)   Attendance Attended   Attendance Duration (min) 46-60   Interactions Interacted appropriately   Affect/Mood Appropriate   Goals Achieved Identified feelings; Identified triggers; Able to give feedback to another;Able to experience relief/decrease in symptoms; Able to self-disclose;Verbalized increased hopefulness; Able to manage/cope with feelings; Able to reflect/comment on own behavior;Able to engage in interactions; Able to listen to others;Discussed coping strategies     Patient was attentive and engaged throughout group  She was able to answer several of the questions about the film shown  Patient was also able to relate the character's anxiety to anxiety she she has experienced and has watched family members experience  Patient did not initiate discussion of physical/somatic symptoms at all  Respectful of peers

## 2019-09-07 PROCEDURE — 99232 SBSQ HOSP IP/OBS MODERATE 35: CPT | Performed by: PHYSICIAN ASSISTANT

## 2019-09-07 RX ADMIN — TIOTROPIUM BROMIDE 18 MCG: 18 CAPSULE ORAL; RESPIRATORY (INHALATION) at 09:31

## 2019-09-07 RX ADMIN — CLOZAPINE 50 MG: 25 TABLET ORAL at 09:32

## 2019-09-07 RX ADMIN — LEVOTHYROXINE SODIUM 125 MCG: 125 TABLET ORAL at 06:08

## 2019-09-07 RX ADMIN — FLUOXETINE 10 MG: 10 CAPSULE ORAL at 09:30

## 2019-09-07 RX ADMIN — FLUTICASONE FUROATE AND VILANTEROL TRIFENATATE 1 PUFF: 200; 25 POWDER RESPIRATORY (INHALATION) at 09:31

## 2019-09-07 RX ADMIN — PANTOPRAZOLE SODIUM 40 MG: 40 TABLET, DELAYED RELEASE ORAL at 06:08

## 2019-09-07 RX ADMIN — CLOZAPINE 50 MG: 25 TABLET ORAL at 21:43

## 2019-09-07 RX ADMIN — THEOPHYLLINE ANHYDROUS 200 MG: 200 CAPSULE, EXTENDED RELEASE ORAL at 09:30

## 2019-09-07 RX ADMIN — CLOZAPINE 50 MG: 25 TABLET ORAL at 17:20

## 2019-09-07 RX ADMIN — MONTELUKAST SODIUM 10 MG: 10 TABLET, COATED ORAL at 21:42

## 2019-09-07 NOTE — PLAN OF CARE
Problem: Alteration in Thoughts and Perception  Goal: Verbalize thoughts and feelings  Description  Interventions:  - Promote a nonjudgmental and trusting relationship with the patient through active listening and therapeutic communication  - Assess patient's level of functioning, behavior and potential for risk  - Engage patient in 1 on 1 interactions for a minimum of 15 minutes each session  - Encourage patient to express fears, feelings, frustrations, and discuss symptoms    - [place] patient to reality, help patient recognize reality-based thinking   - Administer medications as ordered and assess for potential side effects  - Provide the patient education related to the signs and symptoms of the illness and desired effects of prescribed medications  Outcome: Progressing  Goal: Agree to be compliant with medication regime, as prescribed and report medication side effects  Description  Interventions:  - Offer appropriate PRN medication and supervise ingestion; conduct aims, as needed   Outcome: Progressing     Problem: RESPIRATORY - ADULT  Goal: Achieves optimal ventilation and oxygenation  Description  INTERVENTIONS:  - Assess for changes in respiratory status  - Assess for changes in mentation and behavior  - Position to facilitate oxygenation and minimize respiratory effort  - Oxygen administration by appropriate delivery method based on oxygen saturation (per order) or ABGs  - Initiate smoking cessation education as indicated  - Encourage broncho-pulmonary hygiene including cough, deep breathe, Incentive Spirometry  - Assess the need for suctioning and aspirate as needed  - Assess and instruct to report SOB or any respiratory difficulty  - Respiratory Therapy support as indicated  Outcome: Progressing     Problem: Alteration in Thoughts and Perception  Goal: Attend and participate in unit activities, including therapeutic, recreational, and educational groups  Description  Interventions:  - Provide therapeutic and educational activities daily, encourage attendance and participation, and document same in the medical record     CERTIFIED PEER SPECIALIST INTERVENTIONS:    Complete peer assessment with patient to assess their needs and identify their goals to complete while in the recovery program as well as once discharged into the community  Patient will complete WRAP Plan, Crisis Plan and [facility] [employer]  Patient will attend 50% of groups offered on the unit  Patient will complete a goal card weekly  Outcome: Not Progressing     Problem: Depression  Goal: Refrain from isolation  Description  Interventions:  - Develop a trusting relationship   - Encourage socialization   Outcome: Not Progressing     [address] has been isolative to her room & bed, coming out for meal (ate 100%), for scheduled medicines, for HS snack  Did not attend PM Group; explains it is because "Alfonso Goodman" (peer from adjoining room) attended  "She doesn't like me & picks on me " Explained to her that this is unacceptable behavior & the  will intervene if this happens  She is pleasant  Voices intention to follow her new Behavior Plan in staying up an hour after meals, attending @ least 2 groups/day, showivone MARGUERITE  Anticipates a visit from one or more of her siblings this weekend, hopefully bringing "darryl"  Did use her Incentive Spirometer reaching volumes between 750-1000ml  Wearing her QHS humidified nasal O2 @ 1L for bedtime

## 2019-09-07 NOTE — PROGRESS NOTES
Progress Note - Behavioral Health   Cathy Arroyo 60 y o  female MRN: [id number]  Unit/Bed#: [facility] 261-25 Encounter: [id number]    Patient seen, chart reviewed, discussed with staff  Nursing staff notes no significant change the past 24 hours  Patient continues to be noncompliant with her behavior plan of showering every other day and has not been attending groups  She has been compliant with her medications  Patient is calm and cooperative on approach  Limited insight into hospitalization  Reports that she does have anxiety in the mornings and states that her anxiety is higher today due to the change in staff  She denies any  depression  She continues with residual paranoia and believing that she has of micro trip implanted into her arm  Denies any auditory or visual hallucinations  She denies any suicidal or homicidal ideation  She states that she will attend groups today however she has not done so  Physically she offers no complaints of pain or discomfort  She has been sleeping and eating adequately      Behavior over the last 24 hours:  unchanged  Sleep: normal  Appetite: normal  Medication side effects: No  ROS: no complaints  /71 (BP Location: Left arm)   Pulse 97   Temp 97 5 °F (36 4 °C) (Temporal)   Resp 20   Ht 5' 4" (1 626 m)   Wt 68 2 kg (150 lb 6 4 oz)   SpO2 92%   BMI 25 82 kg/m²     Medications:   Current Facility-Administered Medications   Medication Dose Route Frequency    acetaminophen (TYLENOL) tablet 650 mg  650 mg Oral Q6H PRN    albuterol (PROVENTIL HFA,VENTOLIN HFA) inhaler 2 puff  2 puff Inhalation Q4H PRN    aluminum-magnesium hydroxide-simethicone (MYLANTA) 200-200-20 mg/5 mL oral suspension 15 mL  15 mL Oral Q4H PRN    ammonium lactate (LAC-HYDRIN) 12 % lotion 1 application  1 application Topical BID PRN    benzonatate (TESSALON PERLES) capsule 100 mg  100 mg Oral TID PRN    benztropine (COGENTIN) injection 1 mg  1 mg Intramuscular Q8H PRN    cloZAPine (CLOZARIL) tablet 50 mg  50 mg Oral TID    EPINEPHrine PF (ADRENALIN) 1 mg/mL injection 0 15 mg  0 15 mg Intramuscular Once PRN    FLUoxetine (PROzac) capsule 10 mg  10 mg Oral Daily    fluticasone-vilanterol (BREO ELLIPTA) 200-25 MCG/INH inhaler 1 puff  1 puff Inhalation Daily    ketotifen (ZADITOR) 0 025 % ophthalmic solution 1 drop  1 drop Right Eye BID PRN    levothyroxine tablet 125 mcg  125 mcg Oral Early Morning    magnesium hydroxide (MILK OF MAGNESIA) 400 mg/5 mL oral suspension 30 mL  30 mL Oral Daily PRN    montelukast (SINGULAIR) tablet 10 mg  10 mg Oral HS    OLANZapine (ZyPREXA) IM injection 5 mg  5 mg Intramuscular Q8H PRN    OLANZapine (ZyPREXA) tablet 5 mg  5 mg Oral Q8H PRN    pantoprazole (PROTONIX) EC tablet 40 mg  40 mg Oral Early Morning    polyethylene glycol (MIRALAX) packet 17 g  17 g Oral Daily PRN    polyvinyl alcohol (LIQUIFILM TEARS) 1 4 % ophthalmic solution 1 drop  1 drop Both Eyes Q3H PRN    theophylline (JEF-24) 24 hr capsule 200 mg  200 mg Oral Daily    tiotropium (SPIRIVA) capsule for inhaler 18 mcg  18 mcg Inhalation Daily    traZODone (DESYREL) tablet 25 mg  25 mg Oral HS PRN       Labs:   No results displayed because visit has over 200 results            Mental Status Evaluation:  Appearance:  age appropriate, casually dressed and Borderline hygiene   Behavior:  evasive and guarded   Speech:  normal pitch and normal volume   Mood:  anxious and dysthymic   Affect:  mood-congruent   Thought Process:  illogical   Thought Content:  delusions  persecutory   Perceptual Disturbances: Denies auditory or visual hallucinations   Risk Potential: Suicidal Ideations none, Homicidal Ideations none and Potential for Aggression No   Sensorium:  person, place and time/date   Cognition:  grossly intact   Consciousness:  alert and awake    Attention: attention span appeared shorter than expected for age   Insight:  limited   Judgment: limited   Gait/Station: normal gait/station Motor Activity: no abnormal movements     Progress Toward Goals:  Minimal change    Assessment/Plan   Principal Problem:    Schizoaffective disorder, bipolar type (HCC)  Active Problems:    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HCC)    Acquired hypothyroidism    Gastroesophageal reflux disease without esophagitis      Recommended Treatment:  Continue with medications and treatment plan per Dr Carmel Yanez 50 mg t i d  Prozac 10 mg daily   CBC with diff from yesterday reviewed and ANC 3 5    Continue with group therapy, milieu therapy and occupational therapy  Risks, benefits and possible side effects of Medications:   Risks, benefits, and possible side effects of medications explained to patient and patient verbalizes understanding  Counseling / Coordination of Care  Total floor / unit time spent today 25 minutes  Greater than 50% of total time was spent with the patient and / or family counseling and / or coordination of care  A description of the counseling / coordination of care:  Medication management, chart review, patient is

## 2019-09-07 NOTE — PROGRESS NOTES
Sleeping at this time, no distress noted, precautions maintained  O2 on 1L NC, no s/s of resp distress   Monitoring continues

## 2019-09-07 NOTE — PLAN OF CARE
Problem: Alteration in Thoughts and Perception  Goal: Treatment Goal: Gain control of psychotic behaviors/thinking, reduce/eliminate presenting symptoms and demonstrate improved reality functioning upon discharge  Outcome: Progressing  Goal: Verbalize thoughts and feelings  Description  Interventions:  - Promote a nonjudgmental and trusting relationship with the patient through active listening and therapeutic communication  - Assess patient's level of functioning, behavior and potential for risk  - Engage patient in 1 on 1 interactions for a minimum of 15 minutes each session  - Encourage patient to express fears, feelings, frustrations, and discuss symptoms    - [place] patient to reality, help patient recognize reality-based thinking   - Administer medications as ordered and assess for potential side effects  - Provide the patient education related to the signs and symptoms of the illness and desired effects of prescribed medications  Outcome: Progressing  Goal: Agree to be compliant with medication regime, as prescribed and report medication side effects  Description  Interventions:  - Offer appropriate PRN medication and supervise ingestion; conduct aims, as needed   Outcome: Progressing  Goal: Attend and participate in unit activities, including therapeutic, recreational, and educational groups  Description  Interventions:  - Provide therapeutic and educational activities daily, encourage attendance and participation, and document same in the medical record     CERTIFIED PEER SPECIALIST INTERVENTIONS:    Complete peer assessment with patient to assess their needs and identify their goals to complete while in the recovery program as well as once discharged into the community  Patient will complete WRAP Plan, Crisis Plan and 5 Life Domains  Patient will attend 50% of groups offered on the unit  Patient will complete a goal card weekly      Outcome: Progressing  Goal: Recognize dysfunctional thoughts, communicate reality-based thoughts at the time of discharge  Description  Interventions:  - Provide medication and psycho-education to assist patient in compliance and developing insight into his/her illness   Outcome: Progressing  Goal: Complete daily ADLs, including personal hygiene independently, as able  Description  Interventions:  - Observe, teach, and assist patient with ADLS  - Monitor and promote a balance of rest/activity, with adequate nutrition and elimination   Outcome: Progressing     Problem: Ineffective Coping  Goal: Identifies ineffective coping skills  Outcome: Progressing  Goal: Identifies healthy coping skills  Outcome: Progressing  Goal: Demonstrates healthy coping skills  Outcome: Progressing  Goal: Participates in unit activities  Description  Interventions:  - Provide therapeutic environment   - Provide required programming   - Redirect inappropriate behaviors   Outcome: Progressing  Goal: Patient/Family participate in treatment and DC plans  Description  Interventions:  - Provide therapeutic environment  Outcome: Progressing  Goal: Patient/Family verbalizes awareness of resources  Outcome: Progressing  Goal: Understands least restrictive measures  Description  Interventions:  - Utilize least restrictive behavior  Outcome: Progressing     Problem: Risk for Self Injury/Neglect  Goal: Treatment Goal: Remain safe during length of stay, learn and adopt new coping skills, and be free of self-injurious ideation, impulses and acts at the time of discharge  Outcome: Progressing  Goal: Verbalize thoughts and feelings  Description  Interventions:  - Assess and re-assess patient's lethality and potential for self-injury  - Engage patient in 1:1 interactions, daily, for a minimum of 15 minutes  - Encourage patient to express feelings, fears, frustrations, hopes  - Establish rapport/trust with patient   Outcome: Progressing  Goal: Refrain from harming self  Description  Interventions:  - Monitor patient closely, per order  - Develop a trusting relationship  - Supervise medication ingestion, monitor effects and side effects   Outcome: Progressing  Goal: Attend and participate in unit activities, including therapeutic, recreational, and educational groups  Description  Interventions:  - Provide therapeutic and educational activities daily, encourage attendance and participation, and document same in the medical record  - Obtain collateral information, encourage visitation and family involvement in care   Outcome: Progressing  Goal: Recognize maladaptive responses and adopt new coping mechanisms  Outcome: Progressing  Goal: Complete daily ADLs, including personal hygiene independently, as able  Description  Interventions:  - Observe, teach, and assist patient with ADLS  - Monitor and promote a balance of rest/activity, with adequate nutrition and elimination  Outcome: Progressing     Problem: Depression  Goal: Treatment Goal: Demonstrate behavioral control of depressive symptoms, verbalize feelings of improved mood/affect, and adopt new coping skills prior to discharge  Outcome: Progressing  Goal: Verbalize thoughts and feelings  Description  Interventions:  - Assess and re-assess patient's level of risk   - Engage patient in 1:1 interactions, daily, for a minimum of 15 minutes   - Encourage patient to express feelings, fears, frustrations, hopes   Outcome: Progressing  Goal: Refrain from isolation  Description  Interventions:  - Develop a trusting relationship   - Encourage socialization   Outcome: Progressing  Goal: Refrain from self-neglect  Outcome: Progressing     Problem: Anxiety  Goal: Anxiety is at manageable level  Description  Interventions:  - Assess and monitor patient's anxiety level  - Monitor for signs and symptoms of anxiety both physical and emotional (heart palpitations, chest pain, shortness of breath, headaches, nausea, feeling jumpy, restlessness, irritable, apprehensive)     - Collaborate with interdisciplinary team and initiate plan and interventions as ordered  - [place] patient to unit/surroundings  - Explain treatment plan  - Encourage participation in care  - Encourage verbalization of concerns/fears  - Identify coping mechanisms  - Assist in developing anxiety-reducing skills  - Administer/offer alternative therapies  - Limit or eliminate stimulants  Outcome: Progressing     Problem: Alteration in Orientation  Goal: Interact with staff daily  Description  Interventions:  - Assess and re-assess patient's level of orientation  - Engage patient in 1 on 1 interactions, daily, for a minimum of 15 minutes   - Establish rapport/trust with patient   Outcome: Progressing  Goal: Cooperate with recommended testing/procedures  Description  Interventions:  - Determine need for ancillary testing  - Observe for mental status changes  - Implement falls/precaution protocol   Outcome: Progressing     Problem: PAIN - ADULT  Goal: Verbalizes/displays adequate comfort level or baseline comfort level  Description  Interventions:  - Encourage patient to monitor pain and request assistance  - Assess pain using appropriate pain scale  - Administer analgesics based on type and severity of pain and evaluate response  - Implement non-pharmacological measures as appropriate and evaluate response  - Consider cultural and social influences on pain and pain management  - Notify physician/advanced practitioner if interventions unsuccessful or patient reports new pain  Outcome: Progressing     Problem: SAFETY ADULT  Goal: Patient will remain free of falls  Description  INTERVENTIONS:  - Assess patient frequently for physical needs  -  Identify cognitive and physical deficits and behaviors that affect risk of falls    -  [place] fall precautions as indicated by assessment   - Educate patient/family on patient safety including physical limitations  - Instruct patient to call for assistance with activity based on assessment  - Modify environment to reduce risk of injury  - Consider OT/PT consult to assist with strengthening/mobility  Outcome: Progressing     Problem: RESPIRATORY - ADULT  Goal: Achieves optimal ventilation and oxygenation  Description  INTERVENTIONS:  - Assess for changes in respiratory status  - Assess for changes in mentation and behavior  - Position to facilitate oxygenation and minimize respiratory effort  - Oxygen administration by appropriate delivery method based on oxygen saturation (per order) or ABGs  - Initiate smoking cessation education as indicated  - Encourage broncho-pulmonary hygiene including cough, deep breathe, Incentive Spirometry  - Assess the need for suctioning and aspirate as needed  - Assess and instruct to report SOB or any respiratory difficulty  - Respiratory Therapy support as indicated  Outcome: Progressing     Problem: DISCHARGE PLANNING  Goal: Discharge to home or other facility with appropriate resources  Description  INTERVENTIONS:  - Conduct assessment to determine patient/family and health care team treatment goals, and need for post-acute services based on payer coverage, community resources, and patient preferences, and barriers to discharge  - Address psychosocial, clinical, and financial barriers to discharge as identified in assessment in conjunction with the patient/family and health care team  - Assist the patient in reintegration back into the community by removing barriers which may hinder a successful discharge once deemed stable  - Arrange appropriate level of post-acute services according to patient's needs and preference and payer coverage in collaboration with the physician and health care team  - Communicate with and update the patient/family, physician, and health care team regarding progress on the discharge plan  - Arrange appropriate transportation to post-acute venues    Outcome: Progressing      9/7/19 Shift 7-3  BM-9/5  Shower 9/5 did not wash hair    Groups: None  Visible for meds and meals  Pleasant and cooperative  Compliant with incentive spirometer  Less isolative to room and bed  Observed out of bed in dining room socializing with select peer and staff  No c/o s/s pain, discomfort or distress  Denies depression or anxiety, SI or HI

## 2019-09-08 PROCEDURE — 99232 SBSQ HOSP IP/OBS MODERATE 35: CPT | Performed by: PHYSICIAN ASSISTANT

## 2019-09-08 RX ADMIN — CLOZAPINE 50 MG: 25 TABLET ORAL at 21:31

## 2019-09-08 RX ADMIN — CLOZAPINE 50 MG: 25 TABLET ORAL at 08:51

## 2019-09-08 RX ADMIN — FLUTICASONE FUROATE AND VILANTEROL TRIFENATATE 1 PUFF: 200; 25 POWDER RESPIRATORY (INHALATION) at 08:52

## 2019-09-08 RX ADMIN — PANTOPRAZOLE SODIUM 40 MG: 40 TABLET, DELAYED RELEASE ORAL at 06:19

## 2019-09-08 RX ADMIN — MONTELUKAST SODIUM 10 MG: 10 TABLET, COATED ORAL at 21:31

## 2019-09-08 RX ADMIN — LEVOTHYROXINE SODIUM 125 MCG: 125 TABLET ORAL at 06:19

## 2019-09-08 RX ADMIN — CLOZAPINE 50 MG: 25 TABLET ORAL at 17:00

## 2019-09-08 RX ADMIN — THEOPHYLLINE ANHYDROUS 200 MG: 200 CAPSULE, EXTENDED RELEASE ORAL at 08:51

## 2019-09-08 RX ADMIN — TIOTROPIUM BROMIDE 18 MCG: 18 CAPSULE ORAL; RESPIRATORY (INHALATION) at 08:52

## 2019-09-08 RX ADMIN — FLUOXETINE 10 MG: 10 CAPSULE ORAL at 08:51

## 2019-09-08 NOTE — PLAN OF CARE
Problem: Alteration in Thoughts and Perception  Goal: Agree to be compliant with medication regime, as prescribed and report medication side effects  Description  Interventions:  - Offer appropriate PRN medication and supervise ingestion; conduct aims, as needed   Outcome: Progressing  Goal: Complete daily ADLs, including personal hygiene independently, as able  Description  Interventions:  - Observe, teach, and assist patient with ADLS  - Monitor and promote a balance of rest/activity, with adequate nutrition and elimination   Outcome: Progressing     Problem: Risk for Self Injury/Neglect  Goal: Refrain from harming self  Description  Interventions:  - Monitor patient closely, per order  - Develop a trusting relationship  - Supervise medication ingestion, monitor effects and side effects   Outcome: Progressing     Problem: Anxiety  Goal: Anxiety is at manageable level  Description  Interventions:  - Assess and monitor patient's anxiety level  - Monitor for signs and symptoms of anxiety both physical and emotional (heart palpitations, chest pain, shortness of breath, headaches, nausea, feeling jumpy, restlessness, irritable, apprehensive)  - Collaborate with interdisciplinary team and initiate plan and interventions as ordered    - [place] patient to unit/surroundings  - Explain treatment plan  - Encourage participation in care  - Encourage verbalization of concerns/fears  - Identify coping mechanisms  - Assist in developing anxiety-reducing skills  - Administer/offer alternative therapies  - Limit or eliminate stimulants  Outcome: Progressing     Problem: Alteration in Orientation  Goal: Interact with staff daily  Description  Interventions:  - Assess and re-assess patient's level of orientation  - Engage patient in 1 on 1 interactions, daily, for a minimum of 15 minutes   - Establish rapport/trust with patient   Outcome: Progressing     Problem: RESPIRATORY - ADULT  Goal: Achieves optimal ventilation and oxygenation  Description  INTERVENTIONS:  - Assess for changes in respiratory status  - Assess for changes in mentation and behavior  - Position to facilitate oxygenation and minimize respiratory effort  - Oxygen administration by appropriate delivery method based on oxygen saturation (per order) or ABGs  - Initiate smoking cessation education as indicated  - Encourage broncho-pulmonary hygiene including cough, deep breathe, Incentive Spirometry  - Assess the need for suctioning and aspirate as needed  - Assess and instruct to report SOB or any respiratory difficulty  - Respiratory Therapy support as indicated  Outcome: Progressing     Problem: Risk for Self Injury/Neglect  Goal: Complete daily ADLs, including personal hygiene independently, as able  Description  Interventions:  - Observe, teach, and assist patient with ADLS  - Monitor and promote a balance of rest/activity, with adequate nutrition and elimination  Outcome: Not Progressing   Nikiesperanza Henderson has been in her room most of the shift  Sleeping at times  Social with select peers  Pleasant and cooperative with staff  Did not want to use her Incentive spirometer prior to supper  "I just woke up and I won't be able to do it right now  I will do it after I eat " She did not come get her incentive spirometer  Will offer again after snack tonight  Ate 50% of her meal  No BM or shower this shift  Did not attend evening group  Ate snack and then came for her pills  Oxygen level 94% on room air before O2 I liter applied via nasal cannula  Continue to monitor  Precautions maintained

## 2019-09-08 NOTE — PLAN OF CARE
Problem: Alteration in Thoughts and Perception  Goal: Treatment Goal: Gain control of psychotic behaviors/thinking, reduce/eliminate presenting symptoms and demonstrate improved reality functioning upon discharge  Outcome: Progressing  Goal: Verbalize thoughts and feelings  Description  Interventions:  - Promote a nonjudgmental and trusting relationship with the patient through active listening and therapeutic communication  - Assess patient's level of functioning, behavior and potential for risk  - Engage patient in 1 on 1 interactions for a minimum of 15 minutes each session  - Encourage patient to express fears, feelings, frustrations, and discuss symptoms    - [place] patient to reality, help patient recognize reality-based thinking   - Administer medications as ordered and assess for potential side effects  - Provide the patient education related to the signs and symptoms of the illness and desired effects of prescribed medications  Outcome: Progressing  Goal: Agree to be compliant with medication regime, as prescribed and report medication side effects  Description  Interventions:  - Offer appropriate PRN medication and supervise ingestion; conduct aims, as needed   Outcome: Progressing  Goal: Attend and participate in unit activities, including therapeutic, recreational, and educational groups  Description  Interventions:  - Provide therapeutic and educational activities daily, encourage attendance and participation, and document same in the medical record     CERTIFIED PEER SPECIALIST INTERVENTIONS:    Complete peer assessment with patient to assess their needs and identify their goals to complete while in the recovery program as well as once discharged into the community  Patient will complete WRAP Plan, Crisis Plan and 5 Life Domains  Patient will attend 50% of groups offered on the unit  Patient will complete a goal card weekly      Outcome: Progressing  Goal: Recognize dysfunctional thoughts, communicate reality-based thoughts at the time of discharge  Description  Interventions:  - Provide medication and psycho-education to assist patient in compliance and developing insight into his/her illness   Outcome: Progressing  Goal: Complete daily ADLs, including personal hygiene independently, as able  Description  Interventions:  - Observe, teach, and assist patient with ADLS  - Monitor and promote a balance of rest/activity, with adequate nutrition and elimination   Outcome: Progressing     Problem: Ineffective Coping  Goal: Identifies ineffective coping skills  Outcome: Progressing  Goal: Identifies healthy coping skills  Outcome: Progressing  Goal: Demonstrates healthy coping skills  Outcome: Progressing  Goal: Participates in unit activities  Description  Interventions:  - Provide therapeutic environment   - Provide required programming   - Redirect inappropriate behaviors   Outcome: Progressing  Goal: Patient/Family participate in treatment and DC plans  Description  Interventions:  - Provide therapeutic environment  Outcome: Progressing  Goal: Patient/Family verbalizes awareness of resources  Outcome: Progressing  Goal: Understands least restrictive measures  Description  Interventions:  - Utilize least restrictive behavior  Outcome: Progressing     Problem: Risk for Self Injury/Neglect  Goal: Treatment Goal: Remain safe during length of stay, learn and adopt new coping skills, and be free of self-injurious ideation, impulses and acts at the time of discharge  Outcome: Progressing  Goal: Verbalize thoughts and feelings  Description  Interventions:  - Assess and re-assess patient's lethality and potential for self-injury  - Engage patient in 1:1 interactions, daily, for a minimum of 15 minutes  - Encourage patient to express feelings, fears, frustrations, hopes  - Establish rapport/trust with patient   Outcome: Progressing  Goal: Refrain from harming self  Description  Interventions:  - Monitor patient closely, per order  - Develop a trusting relationship  - Supervise medication ingestion, monitor effects and side effects   Outcome: Progressing  Goal: Attend and participate in unit activities, including therapeutic, recreational, and educational groups  Description  Interventions:  - Provide therapeutic and educational activities daily, encourage attendance and participation, and document same in the medical record  - Obtain collateral information, encourage visitation and family involvement in care   Outcome: Progressing  Goal: Recognize maladaptive responses and adopt new coping mechanisms  Outcome: Progressing  Goal: Complete daily ADLs, including personal hygiene independently, as able  Description  Interventions:  - Observe, teach, and assist patient with ADLS  - Monitor and promote a balance of rest/activity, with adequate nutrition and elimination  Outcome: Progressing     Problem: Depression  Goal: Treatment Goal: Demonstrate behavioral control of depressive symptoms, verbalize feelings of improved mood/affect, and adopt new coping skills prior to discharge  Outcome: Progressing  Goal: Verbalize thoughts and feelings  Description  Interventions:  - Assess and re-assess patient's level of risk   - Engage patient in 1:1 interactions, daily, for a minimum of 15 minutes   - Encourage patient to express feelings, fears, frustrations, hopes   Outcome: Progressing  Goal: Refrain from isolation  Description  Interventions:  - Develop a trusting relationship   - Encourage socialization   Outcome: Progressing  Goal: Refrain from self-neglect  Outcome: Progressing     Problem: Anxiety  Goal: Anxiety is at manageable level  Description  Interventions:  - Assess and monitor patient's anxiety level  - Monitor for signs and symptoms of anxiety both physical and emotional (heart palpitations, chest pain, shortness of breath, headaches, nausea, feeling jumpy, restlessness, irritable, apprehensive)     - Collaborate with interdisciplinary team and initiate plan and interventions as ordered  - [place] patient to unit/surroundings  - Explain treatment plan  - Encourage participation in care  - Encourage verbalization of concerns/fears  - Identify coping mechanisms  - Assist in developing anxiety-reducing skills  - Administer/offer alternative therapies  - Limit or eliminate stimulants  Outcome: Progressing     Problem: Alteration in Orientation  Goal: Interact with staff daily  Description  Interventions:  - Assess and re-assess patient's level of orientation  - Engage patient in 1 on 1 interactions, daily, for a minimum of 15 minutes   - Establish rapport/trust with patient   Outcome: Progressing  Goal: Cooperate with recommended testing/procedures  Description  Interventions:  - Determine need for ancillary testing  - Observe for mental status changes  - Implement falls/precaution protocol   Outcome: Progressing     Problem: PAIN - ADULT  Goal: Verbalizes/displays adequate comfort level or baseline comfort level  Description  Interventions:  - Encourage patient to monitor pain and request assistance  - Assess pain using appropriate pain scale  - Administer analgesics based on type and severity of pain and evaluate response  - Implement non-pharmacological measures as appropriate and evaluate response  - Consider cultural and social influences on pain and pain management  - Notify physician/advanced practitioner if interventions unsuccessful or patient reports new pain  Outcome: Progressing     Problem: SAFETY ADULT  Goal: Patient will remain free of falls  Description  INTERVENTIONS:  - Assess patient frequently for physical needs  -  Identify cognitive and physical deficits and behaviors that affect risk of falls    -  [place] fall precautions as indicated by assessment   - Educate patient/family on patient safety including physical limitations  - Instruct patient to call for assistance with activity based on assessment  - Modify environment to reduce risk of injury  - Consider OT/PT consult to assist with strengthening/mobility  Outcome: Progressing     Problem: RESPIRATORY - ADULT  Goal: Achieves optimal ventilation and oxygenation  Description  INTERVENTIONS:  - Assess for changes in respiratory status  - Assess for changes in mentation and behavior  - Position to facilitate oxygenation and minimize respiratory effort  - Oxygen administration by appropriate delivery method based on oxygen saturation (per order) or ABGs  - Initiate smoking cessation education as indicated  - Encourage broncho-pulmonary hygiene including cough, deep breathe, Incentive Spirometry  - Assess the need for suctioning and aspirate as needed  - Assess and instruct to report SOB or any respiratory difficulty  - Respiratory Therapy support as indicated  Outcome: Progressing     Problem: DISCHARGE PLANNING  Goal: Discharge to home or other facility with appropriate resources  Description  INTERVENTIONS:  - Conduct assessment to determine patient/family and health care team treatment goals, and need for post-acute services based on payer coverage, community resources, and patient preferences, and barriers to discharge  - Address psychosocial, clinical, and financial barriers to discharge as identified in assessment in conjunction with the patient/family and health care team  - Assist the patient in reintegration back into the community by removing barriers which may hinder a successful discharge once deemed stable  - Arrange appropriate level of post-acute services according to patient's needs and preference and payer coverage in collaboration with the physician and health care team  - Communicate with and update the patient/family, physician, and health care team regarding progress on the discharge plan  - Arrange appropriate transportation to post-acute venues    Outcome: Progressing     9/8/19 Shift 7-3  BM-9/7  Shower 9/5 did not wash hair    Groups: None  Visible for meds and meals  Ate 75% breakfast and 100% of lunch  Pleasant and cooperative  Compliant with incentive spirometer  Less isolative to room and bed  Observed out of bed in dining room socializing with select peer and staff  No c/o s/s pain, discomfort or distress  Denies depression or anxiety, SI or HI

## 2019-09-08 NOTE — PROGRESS NOTES
Progress Note - Behavioral Health   Cathy Arroyo 60 y o  female MRN: [id number]  Unit/Bed#: [facility] 274-10 Encounter: [id number]    Patient seen, chart reviewed, discussed with staff  Nursing staff notes no significant change the past 24 hours  Patient is sleeping well at night  She has been cooperative with medications and treatment plan  Patient was calm and cooperative on approach this morning  Appears somewhat superficial in her affect  She has poor insight into hospitalization  Continues with residual paranoia regarding a chip implanted into her forearm  She denies any auditory or visual hallucinations though  Denies any suicidal or homicidal ideation  Reports that she is sleeping and eating well  Minimal participation in groups  Physically she is feeling well and denies any pain or discomfort      Behavior over the last 24 hours:  unchanged  Sleep: normal  Appetite: normal  Medication side effects: No  ROS: no complaints  /56 (BP Location: Right arm) Comment: st 116/69 pulse 97  Pulse 101   Temp 97 5 °F (36 4 °C)   Resp 20   Ht 5' 4" (1 626 m)   Wt 68 9 kg (151 lb 12 8 oz)   SpO2 94%   BMI 26 06 kg/m²     Medications:   Current Facility-Administered Medications   Medication Dose Route Frequency    acetaminophen (TYLENOL) tablet 650 mg  650 mg Oral Q6H PRN    albuterol (PROVENTIL HFA,VENTOLIN HFA) inhaler 2 puff  2 puff Inhalation Q4H PRN    aluminum-magnesium hydroxide-simethicone (MYLANTA) 200-200-20 mg/5 mL oral suspension 15 mL  15 mL Oral Q4H PRN    ammonium lactate (LAC-HYDRIN) 12 % lotion 1 application  1 application Topical BID PRN    benzonatate (TESSALON PERLES) capsule 100 mg  100 mg Oral TID PRN    benztropine (COGENTIN) injection 1 mg  1 mg Intramuscular Q8H PRN    cloZAPine (CLOZARIL) tablet 50 mg  50 mg Oral TID    EPINEPHrine PF (ADRENALIN) 1 mg/mL injection 0 15 mg  0 15 mg Intramuscular Once PRN    FLUoxetine (PROzac) capsule 10 mg  10 mg Oral Daily    fluticasone-vilanterol (BREO ELLIPTA) 200-25 MCG/INH inhaler 1 puff  1 puff Inhalation Daily    ketotifen (ZADITOR) 0 025 % ophthalmic solution 1 drop  1 drop Right Eye BID PRN    levothyroxine tablet 125 mcg  125 mcg Oral Early Morning    magnesium hydroxide (MILK OF MAGNESIA) 400 mg/5 mL oral suspension 30 mL  30 mL Oral Daily PRN    montelukast (SINGULAIR) tablet 10 mg  10 mg Oral HS    OLANZapine (ZyPREXA) IM injection 5 mg  5 mg Intramuscular Q8H PRN    OLANZapine (ZyPREXA) tablet 5 mg  5 mg Oral Q8H PRN    pantoprazole (PROTONIX) EC tablet 40 mg  40 mg Oral Early Morning    polyethylene glycol (MIRALAX) packet 17 g  17 g Oral Daily PRN    polyvinyl alcohol (LIQUIFILM TEARS) 1 4 % ophthalmic solution 1 drop  1 drop Both Eyes Q3H PRN    theophylline (EJF-24) 24 hr capsule 200 mg  200 mg Oral Daily    tiotropium (SPIRIVA) capsule for inhaler 18 mcg  18 mcg Inhalation Daily    traZODone (DESYREL) tablet 25 mg  25 mg Oral HS PRN       Labs:   No results displayed because visit has over 200 results            Mental Status Evaluation:  Appearance:  age appropriate and disheveled   Behavior:  evasive   Speech:  normal pitch and normal volume   Mood:  anxious   Affect:  mood-congruent   Thought Process:  concrete and illogical   Thought Content:  delusions  persecutory   Perceptual Disturbances: None   Risk Potential: none   Sensorium:  person, place and time/date   Cognition:  grossly intact   Consciousness:  alert and awake    Attention: attention span appeared shorter than expected for age   Insight:  limited   Judgment: limited   Gait/Station: normal gait/station   Motor Activity: no abnormal movements     Progress Toward Goals:  Minimal change    Assessment/Plan   Principal Problem:    Schizoaffective disorder, bipolar type (HCC)  Active Problems:    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[address] )    Acquired hypothyroidism    Gastroesophageal reflux disease without esophagitis      Recommended Treatment:   Continue with medications and treatment plan per Dr Panchito Staley 50 mg t i d , last CBC with diff from September 6, 2019 showed ANC of 3 5  Fluoxetine 10 mg p o  Daily    Continue with group therapy, milieu therapy and occupational therapy  Risks, benefits and possible side effects of Medications:   Risks, benefits, and possible side effects of medications explained to patient and patient verbalizes understanding  Counseling / Coordination of Care  Total floor / unit time spent today 25 minutes  Greater than 50% of total time was spent with the patient and / or family counseling and / or coordination of care   A description of the counseling / coordination of care:  Medication management, chart review, patient interview

## 2019-09-08 NOTE — PLAN OF CARE
Problem: RESPIRATORY - ADULT  Goal: Achieves optimal ventilation and oxygenation  Description  INTERVENTIONS:  - Assess for changes in respiratory status  - Assess for changes in mentation and behavior  - Position to facilitate oxygenation and minimize respiratory effort  - Oxygen administration by appropriate delivery method based on oxygen saturation (per order) or ABGs  - Initiate smoking cessation education as indicated  - Encourage broncho-pulmonary hygiene including cough, deep breathe, Incentive Spirometry  - Assess the need for suctioning and aspirate as needed  - Assess and instruct to report SOB or any respiratory difficulty  - Respiratory Therapy support as indicated  9/8/2019 0551 by Hollis Gregory, RN  Outcome: Progressing  9/7/2019 2120 by Hollis Gregory, RN  Outcome: George Harrell has been asleep since the beginning of the shift  Respirations easy and non labored with oxygen on at 1 liter via nasal cannula  No issues or behaviors  Continue to monitor  Precautions maintained

## 2019-09-08 NOTE — PLAN OF CARE
Problem: Alteration in Thoughts and Perception  Goal: Agree to be compliant with medication regime, as prescribed and report medication side effects  Description  Interventions:  - Offer appropriate PRN medication and supervise ingestion; conduct aims, as needed   Outcome: Progressing     Problem: Risk for Self Injury/Neglect  Goal: Refrain from harming self  Description  Interventions:  - Monitor patient closely, per order  - Develop a trusting relationship  - Supervise medication ingestion, monitor effects and side effects   Outcome: Progressing     Problem: Anxiety  Goal: Anxiety is at manageable level  Description  Interventions:  - Assess and monitor patient's anxiety level  - Monitor for signs and symptoms of anxiety both physical and emotional (heart palpitations, chest pain, shortness of breath, headaches, nausea, feeling jumpy, restlessness, irritable, apprehensive)  - Collaborate with interdisciplinary team and initiate plan and interventions as ordered    - [place] patient to unit/surroundings  - Explain treatment plan  - Encourage participation in care  - Encourage verbalization of concerns/fears  - Identify coping mechanisms  - Assist in developing anxiety-reducing skills  - Administer/offer alternative therapies  - Limit or eliminate stimulants  Outcome: Progressing     Problem: Alteration in Orientation  Goal: Interact with staff daily  Description  Interventions:  - Assess and re-assess patient's level of orientation  - Engage patient in 1 on 1 interactions, daily, for a minimum of 15 minutes   - Establish rapport/trust with patient   Outcome: Progressing     Problem: PAIN - ADULT  Goal: Verbalizes/displays adequate comfort level or baseline comfort level  Description  Interventions:  - Encourage patient to monitor pain and request assistance  - Assess pain using appropriate pain scale  - Administer analgesics based on type and severity of pain and evaluate response  - Implement non-pharmacological measures as appropriate and evaluate response  - Consider cultural and social influences on pain and pain management  - Notify physician/advanced practitioner if interventions unsuccessful or patient reports new pain  Outcome: Progressing     Problem: SAFETY ADULT  Goal: Patient will remain free of falls  Description  INTERVENTIONS:  - Assess patient frequently for physical needs  -  Identify cognitive and physical deficits and behaviors that affect risk of falls    -  [place] fall precautions as indicated by assessment   - Educate patient/family on patient safety including physical limitations  - Instruct patient to call for assistance with activity based on assessment  - Modify environment to reduce risk of injury  - Consider OT/PT consult to assist with strengthening/mobility  Outcome: Progressing     Problem: RESPIRATORY - ADULT  Goal: Achieves optimal ventilation and oxygenation  Description  INTERVENTIONS:  - Assess for changes in respiratory status  - Assess for changes in mentation and behavior  - Position to facilitate oxygenation and minimize respiratory effort  - Oxygen administration by appropriate delivery method based on oxygen saturation (per order) or ABGs  - Initiate smoking cessation education as indicated  - Encourage broncho-pulmonary hygiene including cough, deep breathe, Incentive Spirometry  - Assess the need for suctioning and aspirate as needed  - Assess and instruct to report SOB or any respiratory difficulty  - Respiratory Therapy support as indicated  9/8/2019 1923 by Curry James, RN  Outcome: Progressing  9/8/2019 0551 by Curry James, RN  Outcome: Progressing     Problem: Alteration in Thoughts and Perception  Goal: Complete daily ADLs, including personal hygiene independently, as able  Description  Interventions:  - Observe, teach, and assist patient with ADLS  - Monitor and promote a balance of rest/activity, with adequate nutrition and elimination Outcome: Not Progressing   Agustin Edgar was in her room most of the time  Came out to visit with her brother  Appeared to have a nice visit  Social with select peers  Pleasant and cooperative with staff  Did her incentive spirometer at the beginning of the shift  She got to about 1000ml for 10 breaths  Took her medication before eating 100% of her meal  No complaint of shortness of breath  No BM or shower this shift  Did not attend evening group  Ate snack  Took HS medication  Oxygen saturation 92% prior to  oxygen being applied  VSS  Continue to monitor  Precautions maintained

## 2019-09-09 PROCEDURE — 99231 SBSQ HOSP IP/OBS SF/LOW 25: CPT | Performed by: PSYCHIATRY & NEUROLOGY

## 2019-09-09 RX ADMIN — TIOTROPIUM BROMIDE 18 MCG: 18 CAPSULE ORAL; RESPIRATORY (INHALATION) at 08:52

## 2019-09-09 RX ADMIN — PANTOPRAZOLE SODIUM 40 MG: 40 TABLET, DELAYED RELEASE ORAL at 06:05

## 2019-09-09 RX ADMIN — LEVOTHYROXINE SODIUM 125 MCG: 125 TABLET ORAL at 06:05

## 2019-09-09 RX ADMIN — THEOPHYLLINE ANHYDROUS 200 MG: 200 CAPSULE, EXTENDED RELEASE ORAL at 08:53

## 2019-09-09 RX ADMIN — CLOZAPINE 50 MG: 25 TABLET ORAL at 21:26

## 2019-09-09 RX ADMIN — FLUTICASONE FUROATE AND VILANTEROL TRIFENATATE 1 PUFF: 200; 25 POWDER RESPIRATORY (INHALATION) at 08:52

## 2019-09-09 RX ADMIN — MONTELUKAST SODIUM 10 MG: 10 TABLET, COATED ORAL at 21:26

## 2019-09-09 RX ADMIN — CLOZAPINE 50 MG: 25 TABLET ORAL at 17:40

## 2019-09-09 RX ADMIN — CLOZAPINE 50 MG: 25 TABLET ORAL at 08:53

## 2019-09-09 RX ADMIN — FLUOXETINE 10 MG: 10 CAPSULE ORAL at 08:53

## 2019-09-09 NOTE — ASSESSMENT & PLAN NOTE
Psychiatry Progress Note    Subjective: Interval History     Patient is again refusing showers or attend groups at all times,  appears somewhat suspicious and claims that she does not feel quite safe on the unit  She continues to verbalize her suspicion and mistrust about certain staff who gives her medications especially her inhalers and spirometer  She still complains of not breathing properly  despite breathing appropriately and having nasal oxygen on at night with acceptable pulse ox when checked by staff  She continues talk in a stilted manner as if talking in a court of law and this has not changed either since she came to the unit  She does not admit to any overt hallucinations or paranoia but still appears to harbor some covert  paranoia based on her demeanor and interaction  She continues to talk about 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 and this has not changed since she came to the unit  She has chosen not to to use the portable oxygen to get out of bed and go to groups if necessary  She remains guarded suspicious evasive and in denial of her illness on an ongoing basis  No current suicidal homicidal thoughts intent or plans reported  No overt hallucinations or other delusions reported   No signs or sxs of agranulocytosis or myocarditis or endocarditis and she is moving her bowels so far with no issues    She tends to his lay back on her bed most of the time rather than get up or attend groups other than for meals and medications off and on  She is somewhat passive-aggressive claiming that she will attend groups but actually does not and prefers to stay back on her bed       Current medications:    Current Facility-Administered Medications:     acetaminophen (TYLENOL) tablet 650 mg, 650 mg, Oral, Q6H PRN, Carissa Willis MD    albuterol (PROVENTIL HFA,VENTOLIN HFA) inhaler 2 puff, 2 puff, Inhalation, Q4H PRN, Carissa Willis MD, 2 puff at 07/25/19 1200    aluminum-magnesium hydroxide-simethicone (MYLANTA) 200-200-20 mg/5 mL oral suspension 15 mL, 15 mL, Oral, Q4H PRN, Deep Durand MD    ammonium lactate (LAC-HYDRIN) 12 % lotion 1 application, 1 application, Topical, BID PRN, Deep Durand MD    benzonatate (TESSALON PERLES) capsule 100 mg, 100 mg, Oral, TID PRN, Deep Durand MD    benztropine (COGENTIN) injection 1 mg, 1 mg, Intramuscular, Q8H PRN, Deep Durand MD    cloZAPine (CLOZARIL) tablet 50 mg, 50 mg, Oral, TID, Deep Durand MD, 50 mg at 09/09/19 0853    EPINEPHrine PF (ADRENALIN) 1 mg/mL injection 0 15 mg, 0 15 mg, Intramuscular, Once PRN, Deep Durand MD    FLUoxetine (PROzac) capsule 10 mg, 10 mg, Oral, Daily, Deep Durand MD, 10 mg at 09/09/19 0853    fluticasone-vilanterol (BREO ELLIPTA) 200-25 MCG/INH inhaler 1 puff, 1 puff, Inhalation, Daily, Deep Durand MD, 1 puff at 09/09/19 0852    ketotifen (ZADITOR) 0 025 % ophthalmic solution 1 drop, 1 drop, Right Eye, BID PRN, Deep Durand MD    levothyroxine tablet 125 mcg, 125 mcg, Oral, Early Morning, Deep Durand MD, 125 mcg at 09/09/19 0605    magnesium hydroxide (MILK OF MAGNESIA) 400 mg/5 mL oral suspension 30 mL, 30 mL, Oral, Daily PRN, Deep Durand MD    montelukast (SINGULAIR) tablet 10 mg, 10 mg, Oral, HS, Deep Durand MD, 10 mg at 09/08/19 2131    OLANZapine (ZyPREXA) IM injection 5 mg, 5 mg, Intramuscular, Q8H PRN, Deep Durand MD    OLANZapine (ZyPREXA) tablet 5 mg, 5 mg, Oral, Q8H PRN, Deep Durand MD    pantoprazole (PROTONIX) EC tablet 40 mg, 40 mg, Oral, Early Morning, Deep Durand MD, 40 mg at 09/09/19 0605    polyethylene glycol (MIRALAX) packet 17 g, 17 g, Oral, Daily PRN, Deep Durand MD    polyvinyl alcohol (LIQUIFILM TEARS) 1 4 % ophthalmic solution 1 drop, 1 drop, Both Eyes, Q3H PRN, Deep Durand MD    theophylline (JEF-24) 24 hr capsule 200 mg, 200 mg, Oral, Daily, Deep Durand MD, 200 mg at 09/09/19 0853    tiotropium (SPIRIVA) capsule for inhaler 18 mcg, 18 mcg, Inhalation, Daily, Alissa Rod David Verma MD, 18 mcg at 09/09/19 4398    traZODone (DESYREL) tablet 25 mg, 25 mg, Oral, HS PRN, Shi Pham MD  Justification if on more than two antipsychotics:  Only on his clozapine  Side effects if any:  None    Risks , benefits, side effects and precautions of medications discussed with patient and reminded patient to let us know any problems with medications     Objective:     Vital Signs:  Vitals:    09/08/19 1900 09/08/19 2138 09/09/19 0700 09/09/19 0705   BP: 103/58  135/77 112/61   BP Location: Left arm  Left arm Left arm   Pulse: 89 103 96 101   Resp: 16  18 18   Temp: 97 9 °F (36 6 °C)  97 9 °F (36 6 °C)    TempSrc: Temporal  Temporal    SpO2: 94% 92%     Weight:       Height:         Appearance:  age appropriate, casually dressed and overweight older than stated age   Behavior:  deviant, evasive and guarded and suspicious but with good eye contact   Speech:  normal pitch and normal volume but tends to become loud at times   Mood:  anxious and dysthymic   Affect:  mood-congruent   Thought Process:  goal directed and illogical slightly pressured but able to be redirected and talks as if in a court of low being stilted   Thought Content:  delusions  grandiose and somatic about not being able to breathe despite being on nasal oxygen and paranoid about people out to harm her and Atrovent inhaler tainteded with peanut oil  No current suicidal homicidal thoughts intent or plans reported    No phobias obsessions or compulsions reported   Perceptual Disturbances: None and does not appear responding to internal stimuli   Risk Potential: Tendency to not care for herself if she goes off treatment   Sensorium:  person, place, time/date, situation, day of week, month of year and time   Cognition:  grossly intact   Consciousness:  alert and awake    Attention: Intact concentration and attention span   Intellect: Considered to be at least of average intelligence   Insight:  limited in denial   Judgment: poor      Motor Activity: no abnormal movements         Recent Labs:  Results Reviewed     None          I/O Past 24 hours:  No intake/output data recorded  No intake/output data recorded  Assessment / Plan:     Schizoaffective disorder, bipolar type ([address] )      Reason for continued inpatient care:  Because of underlying paranoia and refusal to go back to the personal care home and inability to care for herself on her own  Acceptance by patient:  Accepting  Jabari Marei in recovery:  About living in another personal care home once she feels better  Understanding of medications :  Has some understanding  Involved in reintegration process:  Adjusting to the unit  Trusting in relatoinship with psychiatrist:  Trusting    Recommended Treatment:    Medication changes:  1) none today  Non-pharmacological treatments  1) continue with  individual therapy group therapy, milieu therapy and occupational therapy and milieu therapy involving multidisciplinary team approach with psychotherapist, case management, nursing, peer support services, art therapist etc using recovery principles and psycho-education about accepting illness and need for treatment   3) encourage to attend to ADLs like taking showers and wearing clean clothes   4) Encourage to attend groups   Safety  1) Safety/communication plan established targeting dynamic risk factors above  Discharge Plan most likely back to the a:  Personal care boarding home with [facility] services once her delusions are managed and mood becomes more stabilized and she becomes more receptive to treatment compliance    Counseling / Coordination of Care    Total floor / unit time spent today 15 minutes  Greater than 50% of total time was spent with the patient and / or family counseling and / or coordination of care  A description of the counseling / coordination of care       Patient's Rights, confidentiality and exceptions to confidentiality, use of automated medical record, [facility] staff access to medical record, and consent to treatment reviewed      Ivonne Nevarez MD

## 2019-09-09 NOTE — PROGRESS NOTES
Patient attended IMR however, did not work on a Timeline due to declining IMR  group last week        09/09/19 1100   Activity/Group Checklist   Group   (IMR)   Attendance Attended   Attendance Duration (min) 46-60   Interactions Did not interact   Affect/Mood Appropriate   Goals Achieved Able to listen to others

## 2019-09-09 NOTE — PROGRESS NOTES
09/09/19 0800   Team Meeting   Meeting Type Daily Rounds   Team Members Present   Team Members Present Physician;Nurse;; Other (Discipline and Name)   Physician Team Member Dr Surinder Robbins Team Member Asim Juarez RN   Care Management Team Member Brenda Castaneda   Other (Discipline and Name) Erika Puga, [place]; SHANIKA Gonzáles     Patient refuses to take showers stating the shower is too far away and there is no hairdryer to dry her hair  Slept

## 2019-09-09 NOTE — PROGRESS NOTES
Progress Note - Brandon Yang 1955, 60 y o  female MRN: [id number]    Unit/Bed#: [facility] 726-89 Encounter: [id number]    Primary Care Provider: Merline Beck, MD   Date and time admitted to hospital: 7/23/2019  5:30 PM        * Schizoaffective disorder, bipolar type [facility])  Assessment & Plan  Psychiatry Progress Note    Subjective: Interval History     Patient is again refusing showers or attend groups at all times,  appears somewhat suspicious and claims that she does not feel quite safe on the unit  She continues to verbalize her suspicion and mistrust about certain staff who gives her medications especially her inhalers and spirometer  She still complains of not breathing properly  despite breathing appropriately and having nasal oxygen on at night with acceptable pulse ox when checked by staff  She continues talk in a stilted manner as if talking in a court of law and this has not changed either since she came to the unit  She does not admit to any overt hallucinations or paranoia but still appears to harbor some covert  paranoia based on her demeanor and interaction  She continues to talk about 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 and this has not changed since she came to the unit  She has chosen not to to use the portable oxygen to get out of bed and go to groups if necessary  She remains guarded suspicious evasive and in denial of her illness on an ongoing basis  No current suicidal homicidal thoughts intent or plans reported  No overt hallucinations or other delusions reported   No signs or sxs of agranulocytosis or myocarditis or endocarditis and she is moving her bowels so far with no issues    She tends to his lay back on her bed most of the time rather than get up or attend groups other than for meals and medications off and on  She is somewhat passive-aggressive claiming that she will attend groups but actually does not and prefers to stay back on her bed  Current medications:    Current Facility-Administered Medications:     acetaminophen (TYLENOL) tablet 650 mg, 650 mg, Oral, Q6H PRN, Greer Beltran MD    albuterol (PROVENTIL HFA,VENTOLIN HFA) inhaler 2 puff, 2 puff, Inhalation, Q4H PRN, Greer Beltran MD, 2 puff at 07/25/19 1200    aluminum-magnesium hydroxide-simethicone (MYLANTA) 200-200-20 mg/5 mL oral suspension 15 mL, 15 mL, Oral, Q4H PRN, Greer Beltran MD    ammonium lactate (LAC-HYDRIN) 12 % lotion 1 application, 1 application, Topical, BID PRN, Greer Beltran MD    benzonatate (TESSALON PERLES) capsule 100 mg, 100 mg, Oral, TID PRN, Greer Beltran MD    benztropine (COGENTIN) injection 1 mg, 1 mg, Intramuscular, Q8H PRN, Greer Beltran MD    cloZAPine (CLOZARIL) tablet 50 mg, 50 mg, Oral, TID, Greer Beltran MD, 50 mg at 09/09/19 0853    EPINEPHrine PF (ADRENALIN) 1 mg/mL injection 0 15 mg, 0 15 mg, Intramuscular, Once PRN, Greer Beltran MD    FLUoxetine (PROzac) capsule 10 mg, 10 mg, Oral, Daily, Greer Beltran MD, 10 mg at 09/09/19 0853    fluticasone-vilanterol (BREO ELLIPTA) 200-25 MCG/INH inhaler 1 puff, 1 puff, Inhalation, Daily, Greer Beltran MD, 1 puff at 09/09/19 0852    ketotifen (ZADITOR) 0 025 % ophthalmic solution 1 drop, 1 drop, Right Eye, BID PRN, Greer Beltran MD    levothyroxine tablet 125 mcg, 125 mcg, Oral, Early Morning, Greer Beltran MD, 125 mcg at 09/09/19 0605    magnesium hydroxide (MILK OF MAGNESIA) 400 mg/5 mL oral suspension 30 mL, 30 mL, Oral, Daily PRN, Greer Beltran MD    montelukast (SINGULAIR) tablet 10 mg, 10 mg, Oral, HS, Greer Beltran MD, 10 mg at 09/08/19 2131    OLANZapine (ZyPREXA) IM injection 5 mg, 5 mg, Intramuscular, Q8H PRN, Greer Beltran MD    OLANZapine (ZyPREXA) tablet 5 mg, 5 mg, Oral, Q8H PRN, Greer Beltran MD    pantoprazole (PROTONIX) EC tablet 40 mg, 40 mg, Oral, Early Morning, Greer Beltran MD, 40 mg at 09/09/19 0605    polyethylene glycol (MIRALAX) packet 17 g, 17 g, Oral, Daily PRN, MD Claus Damon polyvinyl alcohol (LIQUIFILM TEARS) 1 4 % ophthalmic solution 1 drop, 1 drop, Both Eyes, Q3H PRN, Jerome Lopez MD    theophylline (JEF-24) 24 hr capsule 200 mg, 200 mg, Oral, Daily, Jerome Lopez MD, 200 mg at 09/09/19 0853    tiotropium (SPIRIVA) capsule for inhaler 18 mcg, 18 mcg, Inhalation, Daily, Jerome Lopez MD, 18 mcg at 09/09/19 3218    traZODone (DESYREL) tablet 25 mg, 25 mg, Oral, HS PRN, Jerome Lopez MD  Justification if on more than two antipsychotics:  Only on his clozapine  Side effects if any:  None    Risks , benefits, side effects and precautions of medications discussed with patient and reminded patient to let us know any problems with medications     Objective:     Vital Signs:  Vitals:    09/08/19 1900 09/08/19 2138 09/09/19 0700 09/09/19 0705   BP: 103/58  135/77 112/61   BP Location: Left arm  Left arm Left arm   Pulse: 89 103 96 101   Resp: 16  18 18   Temp: 97 9 °F (36 6 °C)  97 9 °F (36 6 °C)    TempSrc: Temporal  Temporal    SpO2: 94% 92%     Weight:       Height:         Appearance:  age appropriate, casually dressed and overweight older than stated age   Behavior:  deviant, evasive and guarded and suspicious but with good eye contact   Speech:  normal pitch and normal volume but tends to become loud at times   Mood:  anxious and dysthymic   Affect:  mood-congruent   Thought Process:  goal directed and illogical slightly pressured but able to be redirected and talks as if in a court of low being stilted   Thought Content:  delusions  grandiose and somatic about not being able to breathe despite being on nasal oxygen and paranoid about people out to harm her and Atrovent inhaler tainteded with peanut oil  No current suicidal homicidal thoughts intent or plans reported    No phobias obsessions or compulsions reported   Perceptual Disturbances: None and does not appear responding to internal stimuli   Risk Potential: Tendency to not care for herself if she goes off treatment   Sensorium: person, place, time/date, situation, day of week, month of year and time   Cognition:  grossly intact   Consciousness:  alert and awake    Attention: Intact concentration and attention span   Intellect: Considered to be at least of average intelligence   Insight:  limited in denial   Judgment: poor      Motor Activity: no abnormal movements         Recent Labs:  Results Reviewed     None          I/O Past 24 hours:  No intake/output data recorded  No intake/output data recorded  Assessment / Plan:     Schizoaffective disorder[facility]      Reason for continued inpatient care:  Because of underlying paranoia and refusal to go back to the personal care home and inability to care for herself on her own  Acceptance by patient:  Accepting  Rene Peacock in recovery:  About living in another personal care home once she feels better  Understanding of medications :  Has some understanding  Involved in reintegration process:  Adjusting to the unit  Trusting in relatoinship with psychiatrist:  Trusting    Recommended Treatment:    Medication changes:  1) none today  Non-pharmacological treatments  1) continue with  individual therapy group therapy, milieu therapy and occupational therapy and milieu therapy involving multidisciplinary team approach with psychotherapist, case management, nursing, peer support services, art therapist etc using recovery principles and psycho-education about accepting illness and need for treatment   3) encourage to attend to ADLs like taking showers and wearing clean clothes   4) Encourage to attend groups   Safety  1) Safety/communication plan established targeting dynamic risk factors above    Discharge Plan most likely back to the a:  Personal care boarding home with act services once her delusions are managed and mood becomes more stabilized and she becomes more receptive to treatment compliance    Counseling / Coordination of Care    Total floor / unit time spent today 15 minutes  Greater than 50% of total time was spent with the patient and / or family counseling and / or coordination of care  A description of the counseling / coordination of care  Patient's Rights, confidentiality and exceptions to confidentiality, use of automated medical record, [facility] staff access to medical record, and consent to treatment reviewed      Chichi Lockett MD

## 2019-09-09 NOTE — PLAN OF CARE
Problem: Alteration in Thoughts and Perception  Goal: Complete daily ADLs, including personal hygiene independently, as able  Description  Interventions:  - Observe, teach, and assist patient with ADLS  - Monitor and promote a balance of rest/activity, with adequate nutrition and elimination   Outcome: Progressing     Problem: Risk for Self Injury/Neglect  Goal: Refrain from harming self  Description  Interventions:  - Monitor patient closely, per order  - Develop a trusting relationship  - Supervise medication ingestion, monitor effects and side effects   Outcome: Progressing     Problem: Depression  Goal: Verbalize thoughts and feelings  Description  Interventions:  - Assess and re-assess patient's level of risk   - Engage patient in 1:1 interactions, daily, for a minimum of 15 minutes   - Encourage patient to express feelings, fears, frustrations, hopes   Outcome: Progressing     Problem: Alteration in Orientation  Goal: Interact with staff daily  Description  Interventions:  - Assess and re-assess patient's level of orientation  - Engage patient in 1 on 1 interactions, daily, for a minimum of 15 minutes   - Establish rapport/trust with patient   Outcome: Progressing     Problem: SAFETY ADULT  Goal: Patient will remain free of falls  Description  INTERVENTIONS:  - Assess patient frequently for physical needs  -  Identify cognitive and physical deficits and behaviors that affect risk of falls  -  [place] fall precautions as indicated by assessment   - Educate patient/family on patient safety including physical limitations  - Instruct patient to call for assistance with activity based on assessment  - Modify environment to reduce risk of injury  - Consider OT/PT consult to assist with strengthening/mobility  Outcome: Progressing    Visible for meds and meals, appetite intact  Pleasant and cooperative  Compliant with incentive spirometer  Less isolative to room and bed    Observed out of bed in dining room socializing with select peer and staff  No c/o s/s pain, discomfort or distress  Denies depression or anxiety, SI or HI

## 2019-09-09 NOTE — PLAN OF CARE
Problem: PAIN - ADULT  Goal: Verbalizes/displays adequate comfort level or baseline comfort level  Description  Interventions:  - Encourage patient to monitor pain and request assistance  - Assess pain using appropriate pain scale  - Administer analgesics based on type and severity of pain and evaluate response  - Implement non-pharmacological measures as appropriate and evaluate response  - Consider cultural and social influences on pain and pain management  - Notify physician/advanced practitioner if interventions unsuccessful or patient reports new pain  Outcome: Progressing     Problem: SAFETY ADULT  Goal: Patient will remain free of falls  Description  INTERVENTIONS:  - Assess patient frequently for physical needs  -  Identify cognitive and physical deficits and behaviors that affect risk of falls    -  [place] fall precautions as indicated by assessment   - Educate patient/family on patient safety including physical limitations  - Instruct patient to call for assistance with activity based on assessment  - Modify environment to reduce risk of injury  - Consider OT/PT consult to assist with strengthening/mobility  Outcome: Progressing     Problem: RESPIRATORY - ADULT  Goal: Achieves optimal ventilation and oxygenation  Description  INTERVENTIONS:  - Assess for changes in respiratory status  - Assess for changes in mentation and behavior  - Position to facilitate oxygenation and minimize respiratory effort  - Oxygen administration by appropriate delivery method based on oxygen saturation (per order) or ABGs  - Initiate smoking cessation education as indicated  - Encourage broncho-pulmonary hygiene including cough, deep breathe, Incentive Spirometry  - Assess the need for suctioning and aspirate as needed  - Assess and instruct to report SOB or any respiratory difficulty  - Respiratory Therapy support as indicated  Outcome: Lorene Short maintained on ongoing fall, aspiration and SAFE precaution without incident on this shift   Laying in bed with her eyes closed, breath even and unlabored with o2 @1L/m with the humidification   No sign or symptom of respiratory distress noted  Parul Zhao continues to remain free from fall   Denies any pain or discomfort   Thus far she is in stabled condition   Q 15 minutes checks during the night continues  No behavioral noted    Will continue to monitor behavioral, respiratory, and sleep pattern

## 2019-09-09 NOTE — PROGRESS NOTES
09/09/19 0900   Team Meeting   Meeting Type Tx Team Meeting   Initial Conference Date 09/09/19   Next Conference Date 09/16/19   Team Members Present   Team Members Present Physician;Nurse;; Other (Discipline and Name)   Physician Team Member Dr Darrin Khan Team Member Madison Koehler RN   Care Management Team Member Brenda Reyes   Other (Discipline and Name) Madeline Vance [place]; Bhavya Espinal CPS; Preeti Su, [facility] REYES    Patient/Family Present   Patient Present Yes   Patient's Family Present No     Patient attended treatment team meeting this morning without her self assessment completed  Patient attended 21% of groups offered last week  Patient reports she is interested in getting a pass  Patient knows she needs to shower every other day, however, refuses to do so at this time  Reports she is worried about an accident happening in the shower  Has many excuses as to why she can't take a shower  Reports she isn't stubborn, this is just not something she is capable of  No other questions/concerns noted at conclusion of the meeting

## 2019-09-10 PROCEDURE — 99231 SBSQ HOSP IP/OBS SF/LOW 25: CPT | Performed by: PSYCHIATRY & NEUROLOGY

## 2019-09-10 RX ADMIN — THEOPHYLLINE ANHYDROUS 200 MG: 200 CAPSULE, EXTENDED RELEASE ORAL at 08:28

## 2019-09-10 RX ADMIN — FLUTICASONE FUROATE AND VILANTEROL TRIFENATATE 1 PUFF: 200; 25 POWDER RESPIRATORY (INHALATION) at 08:29

## 2019-09-10 RX ADMIN — CLOZAPINE 50 MG: 25 TABLET ORAL at 17:55

## 2019-09-10 RX ADMIN — CLOZAPINE 50 MG: 25 TABLET ORAL at 21:33

## 2019-09-10 RX ADMIN — TIOTROPIUM BROMIDE 18 MCG: 18 CAPSULE ORAL; RESPIRATORY (INHALATION) at 08:29

## 2019-09-10 RX ADMIN — CLOZAPINE 50 MG: 25 TABLET ORAL at 08:28

## 2019-09-10 RX ADMIN — MONTELUKAST SODIUM 10 MG: 10 TABLET, COATED ORAL at 21:33

## 2019-09-10 RX ADMIN — LEVOTHYROXINE SODIUM 125 MCG: 125 TABLET ORAL at 06:01

## 2019-09-10 RX ADMIN — PANTOPRAZOLE SODIUM 40 MG: 40 TABLET, DELAYED RELEASE ORAL at 06:01

## 2019-09-10 RX ADMIN — FLUOXETINE 10 MG: 10 CAPSULE ORAL at 08:28

## 2019-09-10 NOTE — PLAN OF CARE
Problem: Alteration in Thoughts and Perception  Goal: Verbalize thoughts and feelings  Description  Interventions:  - Promote a nonjudgmental and trusting relationship with the patient through active listening and therapeutic communication  - Assess patient's level of functioning, behavior and potential for risk  - Engage patient in 1 on 1 interactions for a minimum of 15 minutes each session  - Encourage patient to express fears, feelings, frustrations, and discuss symptoms    - [place] patient to reality, help patient recognize reality-based thinking   - Administer medications as ordered and assess for potential side effects  - Provide the patient education related to the signs and symptoms of the illness and desired effects of prescribed medications  Outcome: Progressing  Goal: Agree to be compliant with medication regime, as prescribed and report medication side effects  Description  Interventions:  - Offer appropriate PRN medication and supervise ingestion; conduct aims, as needed   Outcome: Progressing  Goal: Attend and participate in unit activities, including therapeutic, recreational, and educational groups  Description  Interventions:  - Provide therapeutic and educational activities daily, encourage attendance and participation, and document same in the medical record     CERTIFIED PEER SPECIALIST INTERVENTIONS:    Complete peer assessment with patient to assess their needs and identify their goals to complete while in the recovery program as well as once discharged into the community  Patient will complete WRAP Plan, Crisis Plan and [employer]  Patient will attend 50% of groups offered on the unit  Patient will complete a goal card weekly      Outcome: Progressing  Goal: Complete daily ADLs, including personal hygiene independently, as able  Description  Interventions:  - Observe, teach, and assist patient with ADLS  - Monitor and promote a balance of rest/activity, with adequate nutrition and elimination   Outcome: Not Progressing     Problem: Alteration in Orientation  Goal: Interact with staff daily  Description  Interventions:  - Assess and re-assess patient's level of orientation  - Engage patient in 1 on 1 interactions, daily, for a minimum of 15 minutes   - Establish rapport/trust with patient   Outcome: Progressing    9/10/19 Shift 7-3  BM-10  Shower 5   Appetite Intact  Attended IMR group   Visible for meds and meals, appetite intact  Remains somatically preoccupied and lacks insight on her own recovery  Pleasant during conversation with this writer but only superficially  Encouraged to remain out of bed and to ambulate but patient continues to be too fatigued to ambulate and verbalized that she would rather rest  Non-compliant with incentive spirometer this shift  No discomfort or distress reported  Denies depression or anxiety at this time  Will continue to monitor

## 2019-09-10 NOTE — PROGRESS NOTES
09/10/19 0900   Team Meeting   Meeting Type Daily Rounds   Team Members Present   Team Members Present Physician;Nurse;; Other (Discipline and Name)   Physician Team Member Dr Helena Navarro Team Member Elizabeth Rowland RN   Care Management Team Member Brenda Rivas   Other (Discipline and Name) Yair Gonsalves [place]; Jorje Nephew, CPS     Patient still refuses to take a shower  Not attending many groups  Still somatic at times  Slept

## 2019-09-10 NOTE — PLAN OF CARE
Problem: DISCHARGE PLANNING  Goal: Discharge to home or other facility with appropriate resources  Description  INTERVENTIONS:  - Conduct assessment to determine patient/family and health care team treatment goals, and need for post-acute services based on payer coverage, community resources, and patient preferences, and barriers to discharge  - Address psychosocial, clinical, and financial barriers to discharge as identified in assessment in conjunction with the patient/family and health care team  - Assist the patient in reintegration back into the community by removing barriers which may hinder a successful discharge once deemed stable  - Arrange appropriate level of post-acute services according to patient's needs and preference and payer coverage in collaboration with the physician and health care team  - Communicate with and update the patient/family, physician, and health care team regarding progress on the discharge plan  - Arrange appropriate transportation to post-acute venues    Outcome: Not Progressing     CM received phone call from patient's sister, Charley Kearns, requesting update on patient  CM explained that patient is not progressing well with the program at this time  Charley Kearns reported that what she thought was going on but wasn't getting a clear description from the patient so figured she'd speak with the team to get a full picture of what was happening  CM reported that the team will continue to work with patient and assist with her needs as needed  Charley Kearns thanked this CM for the update  CM will continue to monitor patient's progress and assist with discharge planning needs

## 2019-09-10 NOTE — ASSESSMENT & PLAN NOTE
Psychiatry Progress Note    Subjective: Interval History     Patient is still refusing showers since the 28 of last month or attend groups at all times despite claiming that she will start attending and take showers  She is still  suspicious and claims that she does not feel quite safe on the unit and remained suspicious about certain staff who gives her medications , inhalers and the spirometer  She still complains of not breathing properly despite breathing appropriately and having nasal oxygen on at night with acceptable pulse ox when checked by staff  She continues talk in a stilted manner as if talking in a court of law and this has not changed either since she came to the unit  She does not admit to any overt hallucinations or paranoia but still appears to harbor some covert  paranoia based on her demeanor and interaction  She continues to talk about 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  She has chosen not to to use the portable oxygen to get out of bed and go to groups if necessary  She remains guarded suspicious evasive and in denial of her illness on an ongoing basis  She has been in touch with her sister who also try to get her get out of bed and take a shower but that has not been successful yet  No current suicidal homicidal thoughts intent or plans reported  No overt hallucinations or other delusions reported   No signs or sxs of agranulocytosis or myocarditis or endocarditis and she is moving her bowels so far with no issues    She is still passive-aggressive and refuses to get out of bed or take showers or attend groups despite claiming that she would   Current medications:    Current Facility-Administered Medications:     acetaminophen (TYLENOL) tablet 650 mg, 650 mg, Oral, Q6H PRN, Sandhya Bolaños MD    albuterol (PROVENTIL HFA,VENTOLIN HFA) inhaler 2 puff, 2 puff, Inhalation, Q4H PRN, Sandhya Bolaños MD, 2 puff at 07/25/19 1200    aluminum-magnesium hydroxide-simethicone (MYLANTA) 200-200-20 mg/5 mL oral suspension 15 mL, 15 mL, Oral, Q4H PRN, Chichi Lockett MD    ammonium lactate (LAC-HYDRIN) 12 % lotion 1 application, 1 application, Topical, BID PRN, Chichi Lockett MD    benzonatate (TESSALON PERLES) capsule 100 mg, 100 mg, Oral, TID PRN, Chichi Lockett MD    benztropine (COGENTIN) injection 1 mg, 1 mg, Intramuscular, Q8H PRN, Chcihi Lockett MD    cloZAPine (CLOZARIL) tablet 50 mg, 50 mg, Oral, TID, Chichi Lockett MD, 50 mg at 09/10/19 3343    EPINEPHrine PF (ADRENALIN) 1 mg/mL injection 0 15 mg, 0 15 mg, Intramuscular, Once PRN, Chichi Lockett MD    FLUoxetine (PROzac) capsule 10 mg, 10 mg, Oral, Daily, Chichi Lockett MD, 10 mg at 09/10/19 0828    fluticasone-vilanterol (BREO ELLIPTA) 200-25 MCG/INH inhaler 1 puff, 1 puff, Inhalation, Daily, Chichi Lockett MD, 1 puff at 09/10/19 0829    ketotifen (ZADITOR) 0 025 % ophthalmic solution 1 drop, 1 drop, Right Eye, BID PRN, Chichi Lockett MD    levothyroxine tablet 125 mcg, 125 mcg, Oral, Early Morning, Chichi Lockett MD, 125 mcg at 09/10/19 0601    magnesium hydroxide (MILK OF MAGNESIA) 400 mg/5 mL oral suspension 30 mL, 30 mL, Oral, Daily PRN, Chichi Lockett MD    montelukast (SINGULAIR) tablet 10 mg, 10 mg, Oral, HS, Chichi Lockett MD, 10 mg at 09/09/19 2126    OLANZapine (ZyPREXA) IM injection 5 mg, 5 mg, Intramuscular, Q8H PRN, Chichi Lockett MD    OLANZapine (ZyPREXA) tablet 5 mg, 5 mg, Oral, Q8H PRN, Chichi Lockett MD    pantoprazole (PROTONIX) EC tablet 40 mg, 40 mg, Oral, Early Morning, Chichi Lockett MD, 40 mg at 09/10/19 0601    polyethylene glycol (MIRALAX) packet 17 g, 17 g, Oral, Daily PRN, Chichi Lockett MD    polyvinyl alcohol (LIQUIFILM TEARS) 1 4 % ophthalmic solution 1 drop, 1 drop, Both Eyes, Q3H PRN, Chichi Lockett MD    theophylline (JEF-24) 24 hr capsule 200 mg, 200 mg, Oral, Daily, Chichi Lockett MD, 200 mg at 09/10/19 0828    tiotropium (SPIRIVA) capsule for inhaler 18 mcg, 18 mcg, Inhalation, Daily, Jian Palma Stefani Burnett MD, 18 mcg at 09/10/19 4457    traZODone (DESYREL) tablet 25 mg, 25 mg, Oral, HS PRN, Sandhya Bolaños MD  Justification if on more than two antipsychotics:  Only on his clozapine  Side effects if any:  None    Risks , benefits, side effects and precautions of medications discussed with patient and reminded patient to let us know any problems with medications     Objective:     Vital Signs:  Vitals:    09/09/19 1605 09/09/19 2010 09/09/19 2130 09/10/19 0700   BP: 120/66 113/64  126/71   BP Location: Left arm Right arm  Right arm   Pulse: 82 101  97   Resp: 18 20  18   Temp: (!) 97 3 °F (36 3 °C) 99 2 °F (37 3 °C)  (!) 97 4 °F (36 3 °C)   TempSrc: Temporal Temporal     SpO2: 92% 97% 95%    Weight:       Height:         Appearance:  age appropriate, casually dressed and overweight older than stated age   Behavior:  deviant, evasive and guarded and suspicious but with good eye contact   Speech:  normal pitch and normal volume but tends to become loud at times   Mood:  anxious and dysthymic   Affect:  mood-congruent   Thought Process:  goal directed and illogical slightly pressured but able to be redirected and talks as if in a court of low being stilted   Thought Content:  delusions  grandiose and somatic about not being able to breathe despite being on nasal oxygen and paranoid about people out to harm her and Atrovent inhaler tainteded with peanut oil  No current suicidal homicidal thoughts intent or plans reported    No phobias obsessions or compulsions reported   Perceptual Disturbances: None and does not appear responding to internal stimuli   Risk Potential: Tendency to not care for herself if she goes off treatment   Sensorium:  person, place, time/date, situation, day of week, month of year and time   Cognition:  grossly intact   Consciousness:  alert and awake    Attention: Intact concentration and attention span   Intellect: Considered to be at least of average intelligence   Insight:  limited in denial Judgment: poor      Motor Activity: no abnormal movements         Recent Labs:  Results Reviewed     None          I/O Past 24 hours:  No intake/output data recorded  No intake/output data recorded  Assessment / Plan:     Schizoaffective disorder, bipolar type ([facility] [address] )      Reason for continued inpatient care:  Because of underlying paranoia and refusal to go back to the personal care home and inability to care for herself on her own  Acceptance by patient:  Accepting  Robert Yadav in recovery:  About living in another personal care home once she feels better  Understanding of medications :  Has some understanding  Involved in reintegration process:  Adjusting to the unit  Trusting in relatoinship with psychiatrist:  Trusting    Recommended Treatment:    Medication changes:  1) none today  Non-pharmacological treatments  1) continue with  individual therapy group therapy, milieu therapy and occupational therapy and milieu therapy involving multidisciplinary team approach with psychotherapist, case management, nursing, peer support services, art therapist etc using recovery principles and psycho-education about accepting illness and need for treatment   3) encourage to attend to ADLs like taking showers and wearing clean clothes   4) Encourage to attend groups   Safety  1) Safety/communication plan established targeting dynamic risk factors above  Discharge Plan most likely back to the a:  Personal care boarding home with act services once her delusions are managed and mood becomes more stabilized and she becomes more receptive to treatment compliance    Counseling / Coordination of Care    Total floor / unit time spent today 15 minutes  Greater than 50% of total time was spent with the patient and / or family counseling and / or coordination of care  A description of the counseling / coordination of care       Patient's Rights, confidentiality and exceptions to confidentiality, use of automated medical record, [facility] staff access to medical record, and consent to treatment reviewed      Ervin Little MD

## 2019-09-10 NOTE — PROGRESS NOTES
Progress Note - Lizbeth Jhaveri 1955, 60 y o  female MRN: [id number]    [facility] [address] Encounter: [id number]    Primary Care Provider: Heather Arredondo MD   Date and time admitted to hospital: 7/23/2019  5:30 PM        * Schizoaffective disorder, bipolar type [facility])  Assessment & Plan  Psychiatry Progress Note    Subjective: Interval History     Patient is still refusing showers or attend groups at all times despite claiming that she will start attending  She is still  suspicious and claims that she does not feel quite safe on the unit and does become suspicious about certain staff who gives her medications and  inhalers and spirometer  She still complains of not breathing properly  despite breathing appropriately and having nasal oxygen on at night with acceptable pulse ox when checked by staff  She continues talk in a stilted manner as if talking in a court of law and this has not changed either since she came to the unit  She does not admit to any overt hallucinations or paranoia but still appears to harbor some covert  paranoia based on her demeanor and interaction  She continues to talk about 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 and this has not changed since she came to the unit  She has chosen not to to use the portable oxygen to get out of bed and go to groups if necessary  She remains guarded suspicious evasive and in denial of her illness on an ongoing basis  No current suicidal homicidal thoughts intent or plans reported  No overt hallucinations or other delusions reported   No signs or sxs of agranulocytosis or myocarditis or endocarditis and she is moving her bowels so far with no issues    She tends to his lay back on her bed most of the time rather than get up or attend groups other than for meals and medications off and on  She is somewhat passive-aggressive claiming that she will attend groups but actually does not and prefers to stay back on her bed  Current medications:    Current Facility-Administered Medications:     acetaminophen (TYLENOL) tablet 650 mg, 650 mg, Oral, Q6H PRN, Carissa Willis MD    albuterol (PROVENTIL HFA,VENTOLIN HFA) inhaler 2 puff, 2 puff, Inhalation, Q4H PRN, Carissa Willis MD, 2 puff at 07/25/19 1200    aluminum-magnesium hydroxide-simethicone (MYLANTA) 200-200-20 mg/5 mL oral suspension 15 mL, 15 mL, Oral, Q4H PRN, Carissa Willis MD    ammonium lactate (LAC-HYDRIN) 12 % lotion 1 application, 1 application, Topical, BID PRN, Carissa Willis MD    benzonatate (TESSALON PERLES) capsule 100 mg, 100 mg, Oral, TID PRN, Carissa Willis MD    benztropine (COGENTIN) injection 1 mg, 1 mg, Intramuscular, Q8H PRN, Carissa Willis MD    cloZAPine (CLOZARIL) tablet 50 mg, 50 mg, Oral, TID, Carissa Willis MD, 50 mg at 09/10/19 7253    EPINEPHrine PF (ADRENALIN) 1 mg/mL injection 0 15 mg, 0 15 mg, Intramuscular, Once PRN, Carissa Willis MD    FLUoxetine (PROzac) capsule 10 mg, 10 mg, Oral, Daily, Carissa Willis MD, 10 mg at 09/10/19 0828    fluticasone-vilanterol (BREO ELLIPTA) 200-25 MCG/INH inhaler 1 puff, 1 puff, Inhalation, Daily, Carissa Willis MD, 1 puff at 09/10/19 0829    ketotifen (ZADITOR) 0 025 % ophthalmic solution 1 drop, 1 drop, Right Eye, BID PRN, Carissa Willis MD    levothyroxine tablet 125 mcg, 125 mcg, Oral, Early Morning, Carissa Willis MD, 125 mcg at 09/10/19 0601    magnesium hydroxide (MILK OF MAGNESIA) 400 mg/5 mL oral suspension 30 mL, 30 mL, Oral, Daily PRN, Carissa Willis MD    montelukast (SINGULAIR) tablet 10 mg, 10 mg, Oral, HS, Carissa Willis MD, 10 mg at 09/09/19 2126    OLANZapine (ZyPREXA) IM injection 5 mg, 5 mg, Intramuscular, Q8H PRN, Carissa Willis MD    OLANZapine (ZyPREXA) tablet 5 mg, 5 mg, Oral, Q8H PRN, Carissa Willis MD    pantoprazole (PROTONIX) EC tablet 40 mg, 40 mg, Oral, Early Morning, Carissa Willis MD, 40 mg at 09/10/19 0601    polyethylene glycol (MIRALAX) packet 17 g, 17 g, Oral, Daily PRN, MD Regina Dimas polyvinyl alcohol (LIQUIFILM TEARS) 1 4 % ophthalmic solution 1 drop, 1 drop, Both Eyes, Q3H PRN, Ervin Little MD    theophylline (JEF-24) 24 hr capsule 200 mg, 200 mg, Oral, Daily, Ervin Little MD, 200 mg at 09/10/19 8580    tiotropium (SPIRIVA) capsule for inhaler 18 mcg, 18 mcg, Inhalation, Daily, Ervin Little MD, 18 mcg at 09/10/19 0829    traZODone (DESYREL) tablet 25 mg, 25 mg, Oral, HS PRN, Ervin Little MD  Justification if on more than two antipsychotics:  Only on his clozapine  Side effects if any:  None    Risks , benefits, side effects and precautions of medications discussed with patient and reminded patient to let us know any problems with medications     Objective:     Vital Signs:  Vitals:    09/09/19 1605 09/09/19 2010 09/09/19 2130 09/10/19 0700   BP: 120/66 113/64  126/71   BP Location: Left arm Right arm  Right arm   Pulse: 82 101  97   Resp: 18 20  18   Temp: (!) 97 3 °F (36 3 °C) 99 2 °F (37 3 °C)  (!) 97 4 °F (36 3 °C)   TempSrc: Temporal Temporal     SpO2: 92% 97% 95%    Weight:       Height:         Appearance:  age appropriate, casually dressed and overweight older than stated age   Behavior:  deviant, evasive and guarded and suspicious but with good eye contact   Speech:  normal pitch and normal volume but tends to become loud at times   Mood:  anxious and dysthymic   Affect:  mood-congruent   Thought Process:  goal directed and illogical slightly pressured but able to be redirected and talks as if in a court of low being stilted   Thought Content:  delusions  grandiose and somatic about not being able to breathe despite being on nasal oxygen and paranoid about people out to harm her and Atrovent inhaler tainteded with peanut oil  No current suicidal homicidal thoughts intent or plans reported    No phobias obsessions or compulsions reported   Perceptual Disturbances: None and does not appear responding to internal stimuli   Risk Potential: Tendency to not care for herself if she goes off treatment   Sensorium:  person, place, time/date, situation, day of week, month of year and time   Cognition:  grossly intact   Consciousness:  alert and awake    Attention: Intact concentration and attention span   Intellect: Considered to be at least of average intelligence   Insight:  limited in denial   Judgment: poor      Motor Activity: no abnormal movements         Recent Labs:  Results Reviewed     None          I/O Past 24 hours:  No intake/output data recorded  No intake/output data recorded  Assessment / Plan:     [facility]      Reason for continued inpatient care:  Because of underlying paranoia and refusal to go back to the personal care home and inability to care for herself on her own  Acceptance by patient:  Accepting  Quinn Velásquez in recovery:  About living in another personal care home once she feels better  Understanding of medications :  Has some understanding  Involved in reintegration process:  Adjusting to the unit  Trusting in relatoinship with psychiatrist:  Trusting    Recommended Treatment:    Medication changes:  1) none today  Non-pharmacological treatments  1) continue with  individual therapy group therapy, milieu therapy and occupational therapy and milieu therapy involving multidisciplinary team approach with psychotherapist, case management, nursing, peer support services, art therapist etc using recovery principles and psycho-education about accepting illness and need for treatment   3) encourage to attend to ADLs like taking showers and wearing clean clothes   4) Encourage to attend groups   Safety  1) Safety/communication plan established targeting dynamic risk factors above    Discharge Plan most likely back to the a:  Personal care boarding home with act services once her delusions are managed and mood becomes more stabilized and she becomes more receptive to treatment compliance    Counseling / Coordination of Care    Total floor / unit time spent today 15 minutes  Greater than 50% of total time was spent with the patient and / or family counseling and / or coordination of care  A description of the counseling / coordination of care  Patient's Rights, confidentiality and exceptions to confidentiality, use of automated medical record, [facility] staff access to medical record, and consent to treatment reviewed      Carissa Willis MD

## 2019-09-10 NOTE — PLAN OF CARE
Problem: Alteration in Thoughts and Perception  Goal: Agree to be compliant with medication regime, as prescribed and report medication side effects  Description  Interventions:  - Offer appropriate PRN medication and supervise ingestion; conduct aims, as needed   Outcome: Progressing     Problem: Alteration in Thoughts and Perception  Goal: Attend and participate in unit activities, including therapeutic, recreational, and educational groups  Description  Interventions:  - Provide therapeutic and educational activities daily, encourage attendance and participation, and document same in the medical record     CERTIFIED PEER SPECIALIST INTERVENTIONS:    Complete peer assessment with patient to assess their needs and identify their goals to complete while in the recovery program as well as once discharged into the community  Patient will complete WRAP Plan, Crisis Plan and [facility] [employer]  Patient will attend 50% of groups offered on the unit  Patient will complete a goal card weekly  Outcome: Not Progressing  Goal: Complete daily ADLs, including personal hygiene independently, as able  Description  Interventions:  - Observe, teach, and assist patient with ADLS  - Monitor and promote a balance of rest/activity, with adequate nutrition and elimination   Outcome: Not Progressing     Problem: Ineffective Coping  Goal: Demonstrates healthy coping skills  Outcome: Not Progressing     Problem: Depression  Goal: Refrain from isolation  Description  Interventions:  - Develop a trusting relationship   - Encourage socialization   Outcome: Not Progressing     [address] has not complied w/her Behavior Plan  Did not stay OOB after eating 100% of meal; "Too tired"  Did not attend to hygiene  Did not attend PM Group  Was visited by her sister & again many excuses w/sister as to why she cannot attend to hygiene   Sister spoke w/staff to confirm that assistance is indeed available, adequate equipment is available to ease this task, optimize safety  Sister remarked to staff in conclusion, "So it's all bullshit " Mat Cast has come out for scheduled medicines  She is pleasant, but, spends her free time in room in bed  She has used the [employer], achieving volumes of 900-1000ml  She is wearing now her QHS humidified nasal O2 @ 1L for bed

## 2019-09-10 NOTE — ASSESSMENT & PLAN NOTE
Psychiatry Progress Note    Subjective: Interval History     Patient is still refusing showers or attend groups at all times despite claiming that she will start attending  She is still  suspicious and claims that she does not feel quite safe on the unit and does become suspicious about certain staff who gives her medications and  inhalers and spirometer  She still complains of not breathing properly  despite breathing appropriately and having nasal oxygen on at night with acceptable pulse ox when checked by staff  She continues talk in a stilted manner as if talking in a court of law and this has not changed either since she came to the unit  She does not admit to any overt hallucinations or paranoia but still appears to harbor some covert  paranoia based on her demeanor and interaction  She continues to talk about 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 and this has not changed since she came to the unit  She has chosen not to to use the portable oxygen to get out of bed and go to groups if necessary  She remains guarded suspicious evasive and in denial of her illness on an ongoing basis  No current suicidal homicidal thoughts intent or plans reported  No overt hallucinations or other delusions reported   No signs or sxs of agranulocytosis or myocarditis or endocarditis and she is moving her bowels so far with no issues    She tends to his lay back on her bed most of the time rather than get up or attend groups other than for meals and medications off and on  She is somewhat passive-aggressive claiming that she will attend groups but actually does not and prefers to stay back on her bed       Current medications:    Current Facility-Administered Medications:     acetaminophen (TYLENOL) tablet 650 mg, 650 mg, Oral, Q6H PRN, Shi Pham MD    albuterol (PROVENTIL HFA,VENTOLIN HFA) inhaler 2 puff, 2 puff, Inhalation, Q4H PRN, Shi Pham MD, 2 puff at 07/25/19 1200   aluminum-magnesium hydroxide-simethicone (MYLANTA) 200-200-20 mg/5 mL oral suspension 15 mL, 15 mL, Oral, Q4H PRN, Prakash Brewster MD    ammonium lactate (LAC-HYDRIN) 12 % lotion 1 application, 1 application, Topical, BID PRN, Prakash Brewster MD    benzonatate (TESSALON PERLES) capsule 100 mg, 100 mg, Oral, TID PRN, Prakash Brewster MD    benztropine (COGENTIN) injection 1 mg, 1 mg, Intramuscular, Q8H PRN, Prakash Brewster MD    cloZAPine (CLOZARIL) tablet 50 mg, 50 mg, Oral, TID, Prakash Brewster MD, 50 mg at 09/10/19 6648    EPINEPHrine PF (ADRENALIN) 1 mg/mL injection 0 15 mg, 0 15 mg, Intramuscular, Once PRN, Prakash Brewster MD    FLUoxetine (PROzac) capsule 10 mg, 10 mg, Oral, Daily, Prakash Brewster MD, 10 mg at 09/10/19 0828    fluticasone-vilanterol (BREO ELLIPTA) 200-25 MCG/INH inhaler 1 puff, 1 puff, Inhalation, Daily, Prakash Brewster MD, 1 puff at 09/10/19 0829    ketotifen (ZADITOR) 0 025 % ophthalmic solution 1 drop, 1 drop, Right Eye, BID PRN, Prakash Brewster MD    levothyroxine tablet 125 mcg, 125 mcg, Oral, Early Morning, Prakash Brewster MD, 125 mcg at 09/10/19 0601    magnesium hydroxide (MILK OF MAGNESIA) 400 mg/5 mL oral suspension 30 mL, 30 mL, Oral, Daily PRN, Prakash Brewster MD    montelukast (SINGULAIR) tablet 10 mg, 10 mg, Oral, HS, Prakash Brewster MD, 10 mg at 09/09/19 2126    OLANZapine (ZyPREXA) IM injection 5 mg, 5 mg, Intramuscular, Q8H PRN, Prakash Brewster MD    OLANZapine (ZyPREXA) tablet 5 mg, 5 mg, Oral, Q8H PRN, Prakash Brewster MD    pantoprazole (PROTONIX) EC tablet 40 mg, 40 mg, Oral, Early Morning, Prakash Brewster MD, 40 mg at 09/10/19 0601    polyethylene glycol (MIRALAX) packet 17 g, 17 g, Oral, Daily PRN, Prakash Brewster MD    polyvinyl alcohol (LIQUIFILM TEARS) 1 4 % ophthalmic solution 1 drop, 1 drop, Both Eyes, Q3H PRN, Prakash Brewster MD    theophylline (JEF-24) 24 hr capsule 200 mg, 200 mg, Oral, Daily, Prakash Brewster MD, 200 mg at 09/10/19 0828    tiotropium (SPIRIVA) capsule for inhaler 18 mcg, 18 mcg, Inhalation, Daily, Zac Sierra MD, 18 mcg at 09/10/19 2102    traZODone (DESYREL) tablet 25 mg, 25 mg, Oral, HS PRN, Zac Sierra MD  Justification if on more than two antipsychotics:  Only on his clozapine  Side effects if any:  None    Risks , benefits, side effects and precautions of medications discussed with patient and reminded patient to let us know any problems with medications     Objective:     Vital Signs:  Vitals:    09/09/19 1605 09/09/19 2010 09/09/19 2130 09/10/19 0700   BP: 120/66 113/64  126/71   BP Location: Left arm Right arm  Right arm   Pulse: 82 101  97   Resp: 18 20  18   Temp: (!) 97 3 °F (36 3 °C) 99 2 °F (37 3 °C)  (!) 97 4 °F (36 3 °C)   TempSrc: Temporal Temporal     SpO2: 92% 97% 95%    Weight:       Height:         Appearance:  age appropriate, casually dressed and overweight older than stated age   Behavior:  deviant, evasive and guarded and suspicious but with good eye contact   Speech:  normal pitch and normal volume but tends to become loud at times   Mood:  anxious and dysthymic   Affect:  mood-congruent   Thought Process:  goal directed and illogical slightly pressured but able to be redirected and talks as if in a court of low being stilted   Thought Content:  delusions  grandiose and somatic about not being able to breathe despite being on nasal oxygen and paranoid about people out to harm her and Atrovent inhaler tainteded with peanut oil  No current suicidal homicidal thoughts intent or plans reported    No phobias obsessions or compulsions reported   Perceptual Disturbances: None and does not appear responding to internal stimuli   Risk Potential: Tendency to not care for herself if she goes off treatment   Sensorium:  person, place, time/date, situation, day of week, month of year and time   Cognition:  grossly intact   Consciousness:  alert and awake    Attention: Intact concentration and attention span   Intellect: Considered to be at least of average intelligence Insight:  limited in denial   Judgment: poor      Motor Activity: no abnormal movements         Recent Labs:  Results Reviewed     None          I/O Past 24 hours:  No intake/output data recorded  No intake/output data recorded  Assessment / Plan:     Schizoaffective disorder, bipolar type ([facility] [address] )      Reason for continued inpatient care:  Because of underlying paranoia and refusal to go back to the personal care home and inability to care for herself on her own  Acceptance by patient:  Accepting  [place]osa [place] in recovery:  About living in another personal care home once she feels better  Understanding of medications :  Has some understanding  Involved in reintegration process:  Adjusting to the unit  Trusting in relatoinship with psychiatrist:  Trusting    Recommended Treatment:    Medication changes:  1) none today  Non-pharmacological treatments  1) continue with  individual therapy group therapy, milieu therapy and occupational therapy and milieu therapy involving multidisciplinary team approach with psychotherapist, case management, nursing, peer support services, art therapist etc using recovery principles and psycho-education about accepting illness and need for treatment   3) encourage to attend to ADLs like taking showers and wearing clean clothes   4) Encourage to attend groups   Safety  1) Safety/communication plan established targeting dynamic risk factors above  Discharge Plan most likely back to the a:  Personal care boarding home with act services once her delusions are managed and mood becomes more stabilized and she becomes more receptive to treatment compliance    Counseling / Coordination of Care    Total floor / unit time spent today 15 minutes  Greater than 50% of total time was spent with the patient and / or family counseling and / or coordination of care  A description of the counseling / coordination of care       Patient's Rights, confidentiality and exceptions to confidentiality, use of automated medical record, [facility] staff access to medical record, and consent to treatment reviewed      Shi Pham MD

## 2019-09-10 NOTE — PROGRESS NOTES
Patient speaking over others and facilitator through-out IMR group  Patient giving other members false information about objectives of case management  Writer attempting to redirect multiply amount of times  Writer did speak with Pt after group and explained to her how she should not be giving others advice and concentrate on her own recovery  Writer also explained to Pt she should not be speaking over other members of the group nor the facilitator and the stressed the need for Pt to raise her hand when in group  Pt was disgruntled by the conversation after group, made a statement about ending conversation due to her lunch coming soon  Writer will continue to remind Pt of group rules and will dismiss from group if she does not comply        09/10/19 1100   Activity/Group Checklist   Group   (IMR/ Handling Disapointment )   Attendance Attended   Attendance Duration (min) 46-60   Interactions Disorganized interaction   Affect/Mood Wide

## 2019-09-10 NOTE — PROGRESS NOTES
Sleeping without distress noted, O2 1L humidified on, no complaints offered, all precautions maintained   Monitoring continues

## 2019-09-11 PROCEDURE — 99231 SBSQ HOSP IP/OBS SF/LOW 25: CPT | Performed by: PSYCHIATRY & NEUROLOGY

## 2019-09-11 RX ADMIN — FLUOXETINE 10 MG: 10 CAPSULE ORAL at 08:34

## 2019-09-11 RX ADMIN — TIOTROPIUM BROMIDE 18 MCG: 18 CAPSULE ORAL; RESPIRATORY (INHALATION) at 08:38

## 2019-09-11 RX ADMIN — MONTELUKAST SODIUM 10 MG: 10 TABLET, COATED ORAL at 21:37

## 2019-09-11 RX ADMIN — CLOZAPINE 50 MG: 25 TABLET ORAL at 08:34

## 2019-09-11 RX ADMIN — CLOZAPINE 50 MG: 25 TABLET ORAL at 21:37

## 2019-09-11 RX ADMIN — LEVOTHYROXINE SODIUM 125 MCG: 125 TABLET ORAL at 06:01

## 2019-09-11 RX ADMIN — FLUTICASONE FUROATE AND VILANTEROL TRIFENATATE 1 PUFF: 200; 25 POWDER RESPIRATORY (INHALATION) at 08:38

## 2019-09-11 RX ADMIN — THEOPHYLLINE ANHYDROUS 200 MG: 200 CAPSULE, EXTENDED RELEASE ORAL at 08:34

## 2019-09-11 RX ADMIN — PANTOPRAZOLE SODIUM 40 MG: 40 TABLET, DELAYED RELEASE ORAL at 06:01

## 2019-09-11 RX ADMIN — CLOZAPINE 50 MG: 25 TABLET ORAL at 17:37

## 2019-09-11 NOTE — PROGRESS NOTES
Pt more controlled in group  Appropriate/ Did raise hand and did not speak over others  Identified feelings about topic however, offered no other in comment on how she related to the topic of molding her own identity  09/11/19 1100   Activity/Group Checklist   Group   (IMR/Forging Meaning and Building Identity )   Attendance Attended   Attendance Duration (min) 46-60   Interactions Interacted appropriately   Affect/Mood Appropriate;Normal range   Goals Achieved Identified feelings; Able to listen to others; Able to engage in interactions

## 2019-09-11 NOTE — PLAN OF CARE
Problem: Alteration in Thoughts and Perception  Goal: Treatment Goal: Gain control of psychotic behaviors/thinking, reduce/eliminate presenting symptoms and demonstrate improved reality functioning upon discharge  Outcome: Not Progressing  Goal: Verbalize thoughts and feelings  Description  Interventions:  - Promote a nonjudgmental and trusting relationship with the patient through active listening and therapeutic communication  - Assess patient's level of functioning, behavior and potential for risk  - Engage patient in 1 on 1 interactions for a minimum of 15 minutes each session  - Encourage patient to express fears, feelings, frustrations, and discuss symptoms    - [place] patient to reality, help patient recognize reality-based thinking   - Administer medications as ordered and assess for potential side effects  - Provide the patient education related to the signs and symptoms of the illness and desired effects of prescribed medications  Outcome: Not Progressing  Goal: Agree to be compliant with medication regime, as prescribed and report medication side effects  Description  Interventions:  - Offer appropriate PRN medication and supervise ingestion; conduct aims, as needed   Outcome: Not Progressing  Goal: Attend and participate in unit activities, including therapeutic, recreational, and educational groups  Description  Interventions:  - Provide therapeutic and educational activities daily, encourage attendance and participation, and document same in the medical record     CERTIFIED PEER SPECIALIST INTERVENTIONS:    Complete peer assessment with patient to assess their needs and identify their goals to complete while in the recovery program as well as once discharged into the community  Patient will complete WRAP Plan, Crisis Plan and 5 Life Domains  Patient will attend 50% of groups offered on the unit  Patient will complete a goal card weekly      Outcome: Not Progressing  Goal: Recognize dysfunctional thoughts, communicate reality-based thoughts at the time of discharge  Description  Interventions:  - Provide medication and psycho-education to assist patient in compliance and developing insight into his/her illness   Outcome: Not Progressing  Goal: Complete daily ADLs, including personal hygiene independently, as able  Description  Interventions:  - Observe, teach, and assist patient with ADLS  - Monitor and promote a balance of rest/activity, with adequate nutrition and elimination   Outcome: Not Progressing     Problem: Ineffective Coping  Goal: Identifies ineffective coping skills  Outcome: Not Progressing  Goal: Identifies healthy coping skills  Outcome: Not Progressing  Goal: Demonstrates healthy coping skills  Outcome: Not Progressing  Goal: Participates in unit activities  Description  Interventions:  - Provide therapeutic environment   - Provide required programming   - Redirect inappropriate behaviors   Outcome: Not Progressing  Goal: Patient/Family participate in treatment and DC plans  Description  Interventions:  - Provide therapeutic environment  Outcome: Not Progressing  Goal: Patient/Family verbalizes awareness of resources  Outcome: Not Progressing  Goal: Understands least restrictive measures  Description  Interventions:  - Utilize least restrictive behavior  Outcome: Not Progressing     Problem: Risk for Self Injury/Neglect  Goal: Treatment Goal: Remain safe during length of stay, learn and adopt new coping skills, and be free of self-injurious ideation, impulses and acts at the time of discharge  Outcome: Not Progressing  Goal: Verbalize thoughts and feelings  Description  Interventions:  - Assess and re-assess patient's lethality and potential for self-injury  - Engage patient in 1:1 interactions, daily, for a minimum of 15 minutes  - Encourage patient to express feelings, fears, frustrations, hopes  - Establish rapport/trust with patient   Outcome: Not Progressing  Goal: Refrain from harming self  Description  Interventions:  - Monitor patient closely, per order  - Develop a trusting relationship  - Supervise medication ingestion, monitor effects and side effects   Outcome: Not Progressing  Goal: Attend and participate in unit activities, including therapeutic, recreational, and educational groups  Description  Interventions:  - Provide therapeutic and educational activities daily, encourage attendance and participation, and document same in the medical record  - Obtain collateral information, encourage visitation and family involvement in care   Outcome: Not Progressing  Goal: Recognize maladaptive responses and adopt new coping mechanisms  Outcome: Not Progressing  Goal: Complete daily ADLs, including personal hygiene independently, as able  Description  Interventions:  - Observe, teach, and assist patient with ADLS  - Monitor and promote a balance of rest/activity, with adequate nutrition and elimination  Outcome: Not Progressing     Problem: Depression  Goal: Treatment Goal: Demonstrate behavioral control of depressive symptoms, verbalize feelings of improved mood/affect, and adopt new coping skills prior to discharge  Outcome: Not Progressing  Goal: Verbalize thoughts and feelings  Description  Interventions:  - Assess and re-assess patient's level of risk   - Engage patient in 1:1 interactions, daily, for a minimum of 15 minutes   - Encourage patient to express feelings, fears, frustrations, hopes   Outcome: Not Progressing  Goal: Refrain from isolation  Description  Interventions:  - Develop a trusting relationship   - Encourage socialization   Outcome: Not Progressing  Goal: Refrain from self-neglect  Outcome: Not Progressing     Problem: Anxiety  Goal: Anxiety is at manageable level  Description  Interventions:  - Assess and monitor patient's anxiety level     - Monitor for signs and symptoms of anxiety both physical and emotional (heart palpitations, chest pain, shortness of breath, headaches, nausea, feeling jumpy, restlessness, irritable, apprehensive)  - Collaborate with interdisciplinary team and initiate plan and interventions as ordered  - [place] patient to unit/surroundings  - Explain treatment plan  - Encourage participation in care  - Encourage verbalization of concerns/fears  - Identify coping mechanisms  - Assist in developing anxiety-reducing skills  - Administer/offer alternative therapies  - Limit or eliminate stimulants  Outcome: Not Progressing     Problem: Alteration in Orientation  Goal: Interact with staff daily  Description  Interventions:  - Assess and re-assess patient's level of orientation  - Engage patient in 1 on 1 interactions, daily, for a minimum of 15 minutes   - Establish rapport/trust with patient   Outcome: Not Progressing  Goal: Cooperate with recommended testing/procedures  Description  Interventions:  - Determine need for ancillary testing  - Observe for mental status changes  - Implement falls/precaution protocol   Outcome: Not Progressing     Problem: PAIN - ADULT  Goal: Verbalizes/displays adequate comfort level or baseline comfort level  Description  Interventions:  - Encourage patient to monitor pain and request assistance  - Assess pain using appropriate pain scale  - Administer analgesics based on type and severity of pain and evaluate response  - Implement non-pharmacological measures as appropriate and evaluate response  - Consider cultural and social influences on pain and pain management  - Notify physician/advanced practitioner if interventions unsuccessful or patient reports new pain  Outcome: Not Progressing     Problem: SAFETY ADULT  Goal: Patient will remain free of falls  Description  INTERVENTIONS:  - Assess patient frequently for physical needs  -  Identify cognitive and physical deficits and behaviors that affect risk of falls    -  [place] fall precautions as indicated by assessment   - Educate patient/family on patient safety including physical limitations  - Instruct patient to call for assistance with activity based on assessment  - Modify environment to reduce risk of injury  - Consider OT/PT consult to assist with strengthening/mobility  Outcome: Not Progressing     Problem: RESPIRATORY - ADULT  Goal: Achieves optimal ventilation and oxygenation  Description  INTERVENTIONS:  - Assess for changes in respiratory status  - Assess for changes in mentation and behavior  - Position to facilitate oxygenation and minimize respiratory effort  - Oxygen administration by appropriate delivery method based on oxygen saturation (per order) or ABGs  - Initiate smoking cessation education as indicated  - Encourage broncho-pulmonary hygiene including cough, deep breathe, Incentive Spirometry  - Assess the need for suctioning and aspirate as needed  - Assess and instruct to report SOB or any respiratory difficulty  - Respiratory Therapy support as indicated  Outcome: Not Progressing     Problem: DISCHARGE PLANNING  Goal: Discharge to home or other facility with appropriate resources  Description  INTERVENTIONS:  - Conduct assessment to determine patient/family and health care team treatment goals, and need for post-acute services based on payer coverage, community resources, and patient preferences, and barriers to discharge  - Address psychosocial, clinical, and financial barriers to discharge as identified in assessment in conjunction with the patient/family and health care team  - Assist the patient in reintegration back into the community by removing barriers which may hinder a successful discharge once deemed stable  - Arrange appropriate level of post-acute services according to patient's needs and preference and payer coverage in collaboration with the physician and health care team  - Communicate with and update the patient/family, physician, and health care team regarding progress on the discharge plan  - Arrange appropriate transportation to post-acute venues    Outcome: Not Progressing     9/11/19 Shift 7-3  BM-10  Shower 5   Groups:  IMR  Meals:  75% breakfast, 0% lunch  Withdrawn and isolative to room and self  No lunch r/t no menu selection and did not like house meal   Refuses shower  Patient was instructed must shower next shift as body odor offensive to others  May have to eat independent of peers  Patient shrugged shoulders and rolled over to end discussion  Patient refuses incentive spirometry  Patient educated on health effects of laying in bed all day  Out of bed only as necessary  Denies depression or anxiety at this time

## 2019-09-11 NOTE — PROGRESS NOTES
2145 Alva Jay had an HS snack, came up for her HS medicine  Wearing her QHS humidified nasal O2 @ 1L now for bed

## 2019-09-11 NOTE — PLAN OF CARE
Problem: Alteration in Thoughts and Perception  Goal: Verbalize thoughts and feelings  Description  Interventions:  - Promote a nonjudgmental and trusting relationship with the patient through active listening and therapeutic communication  - Assess patient's level of functioning, behavior and potential for risk  - Engage patient in 1 on 1 interactions for a minimum of 15 minutes each session  - Encourage patient to express fears, feelings, frustrations, and discuss symptoms    - [place] patient to reality, help patient recognize reality-based thinking   - Administer medications as ordered and assess for potential side effects  - Provide the patient education related to the signs and symptoms of the illness and desired effects of prescribed medications  Outcome: Progressing  Goal: Agree to be compliant with medication regime, as prescribed and report medication side effects  Description  Interventions:  - Offer appropriate PRN medication and supervise ingestion; conduct aims, as needed   Outcome: Progressing     Problem: RESPIRATORY - ADULT  Goal: Achieves optimal ventilation and oxygenation  Description  INTERVENTIONS:  - Assess for changes in respiratory status  - Assess for changes in mentation and behavior  - Position to facilitate oxygenation and minimize respiratory effort  - Oxygen administration by appropriate delivery method based on oxygen saturation (per order) or ABGs  - Initiate smoking cessation education as indicated  - Encourage broncho-pulmonary hygiene including cough, deep breathe, Incentive Spirometry  - Assess the need for suctioning and aspirate as needed  - Assess and instruct to report SOB or any respiratory difficulty  - Respiratory Therapy support as indicated  Outcome: Progressing     Problem: Alteration in Thoughts and Perception  Goal: Attend and participate in unit activities, including therapeutic, recreational, and educational groups  Description  Interventions:  - Provide therapeutic and educational activities daily, encourage attendance and participation, and document same in the medical record     CERTIFIED PEER SPECIALIST INTERVENTIONS:    Complete peer assessment with patient to assess their needs and identify their goals to complete while in the recovery program as well as once discharged into the community  Patient will complete WRAP Plan, Crisis Plan and 5 Life Domains  Patient will attend 50% of groups offered on the unit  Patient will complete a goal card weekly  Outcome: Not Progressing  Goal: Complete daily ADLs, including personal hygiene independently, as able  Description  Interventions:  - Observe, teach, and assist patient with ADLS  - Monitor and promote a balance of rest/activity, with adequate nutrition and elimination   Outcome: Not Progressing     Problem: Depression  Goal: Refrain from isolation  Description  Interventions:  - Develop a trusting relationship   - Encourage socialization   Outcome: Not Progressing     2015 Yanna Singh continues resistive to attending her hygiene  Made her aware blow dryer now available on floor for her use  Continues to throw up obstacles  "Safety first " Maintains the handicap shower setup does not meet her safety or sanitation needs  Reinforced w/her that has staff presence & assistance for safety maintenance, that shower chair is sanitized between each patient use  She is not complying w/her simple behavior plan  Will not stay OOB after meal (ate 100%), ignores invitation to PM Group, ignores hygiene  Is using the Incentive Spirometer & continues to show gains in volumes; reached 1250ml today  Does come to window for scheduled medicine  Otherwise isolative to room & bed

## 2019-09-11 NOTE — PROGRESS NOTES
Not Scheduled to attend      09/11/19 1400   Activity/Group Checklist   Group   (Recovery Anonymous/ Open Discussion  )   Attendance Did not attend   Attendance Duration (min) 46-60   Affect/Mood IRMA

## 2019-09-11 NOTE — PROGRESS NOTES
Progress Note - Lizbeth Jhaveri 1955, 60 y o  female MRN: [id number]    [facility] Encounter: [id number]    Primary Care Provider: Heather Arredondo MD   Date and time admitted to hospital: 7/23/2019  5:30 PM        * Schizoaffective disorder, bipolar type [facility])  Assessment & Plan  Psychiatry Progress Note    Subjective: Interval History     Patient is still refusing showers since the 28 of last month or attend groups at all times despite claiming that she will start attending and take showers  She is still  suspicious and claims that she does not feel quite safe on the unit and remained suspicious about certain staff who gives her medications , inhalers and the spirometer  She still complains of not breathing properly despite breathing appropriately and having nasal oxygen on at night with acceptable pulse ox when checked by staff  She continues talk in a stilted manner as if talking in a court of law and this has not changed either since she came to the unit  She does not admit to any overt hallucinations or paranoia but still appears to harbor some covert  paranoia based on her demeanor and interaction  She continues to talk about 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  She has chosen not to to use the portable oxygen to get out of bed and go to groups if necessary  She remains guarded suspicious evasive and in denial of her illness on an ongoing basis  She has been in touch with her sister who also try to get her get out of bed and take a shower but that has not been successful yet  No current suicidal homicidal thoughts intent or plans reported  No overt hallucinations or other delusions reported   No signs or sxs of agranulocytosis or myocarditis or endocarditis and she is moving her bowels so far with no issues    She is still passive-aggressive and refuses to get out of bed or take showers or attend groups despite claiming that she would   Current medications:    Current Facility-Administered Medications:     acetaminophen (TYLENOL) tablet 650 mg, 650 mg, Oral, Q6H PRN, Shi Pham MD    albuterol (PROVENTIL HFA,VENTOLIN HFA) inhaler 2 puff, 2 puff, Inhalation, Q4H PRN, Shi Pham MD, 2 puff at 07/25/19 1200    aluminum-magnesium hydroxide-simethicone (MYLANTA) 200-200-20 mg/5 mL oral suspension 15 mL, 15 mL, Oral, Q4H PRN, Shi Pham MD    ammonium lactate (LAC-HYDRIN) 12 % lotion 1 application, 1 application, Topical, BID PRN, Shi Pham MD    benzonatate (TESSALON PERLES) capsule 100 mg, 100 mg, Oral, TID PRN, Shi Pham MD    benztropine (COGENTIN) injection 1 mg, 1 mg, Intramuscular, Q8H PRN, Shi Pham MD    cloZAPine (CLOZARIL) tablet 50 mg, 50 mg, Oral, TID, Shi Pham MD, 50 mg at 09/10/19 3874    EPINEPHrine PF (ADRENALIN) 1 mg/mL injection 0 15 mg, 0 15 mg, Intramuscular, Once PRN, Shi Pham MD    FLUoxetine (PROzac) capsule 10 mg, 10 mg, Oral, Daily, Shi Pham MD, 10 mg at 09/10/19 0828    fluticasone-vilanterol (BREO ELLIPTA) 200-25 MCG/INH inhaler 1 puff, 1 puff, Inhalation, Daily, Shi Pham MD, 1 puff at 09/10/19 0829    ketotifen (ZADITOR) 0 025 % ophthalmic solution 1 drop, 1 drop, Right Eye, BID PRN, Shi Pham MD    levothyroxine tablet 125 mcg, 125 mcg, Oral, Early Morning, Shi Pham MD, 125 mcg at 09/10/19 0601    magnesium hydroxide (MILK OF MAGNESIA) 400 mg/5 mL oral suspension 30 mL, 30 mL, Oral, Daily PRN, Shi Pham MD    montelukast (SINGULAIR) tablet 10 mg, 10 mg, Oral, HS, Shi Pham MD, 10 mg at 09/09/19 2126    OLANZapine (ZyPREXA) IM injection 5 mg, 5 mg, Intramuscular, Q8H PRN, Shi Pham MD    OLANZapine (ZyPREXA) tablet 5 mg, 5 mg, Oral, Q8H PRN, Shi Pham MD    pantoprazole (PROTONIX) EC tablet 40 mg, 40 mg, Oral, Early Morning, Shi Pham MD, 40 mg at 09/10/19 0601    polyethylene glycol (MIRALAX) packet 17 g, 17 g, Oral, Daily PRN, Shi Pham MD    polyvinyl alcohol (LIQUIFILM TEARS) 1 4 % ophthalmic solution 1 drop, 1 drop, Both Eyes, Q3H PRN, Roberto Shankar MD    theophylline (JEF-24) 24 hr capsule 200 mg, 200 mg, Oral, Daily, Roberto Shankar MD, 200 mg at 09/10/19 7545    tiotropium (SPIRIVA) capsule for inhaler 18 mcg, 18 mcg, Inhalation, Daily, Roberto Shankar MD, 18 mcg at 09/10/19 0829    traZODone (DESYREL) tablet 25 mg, 25 mg, Oral, HS PRN, Roebrto Shankar MD  Justification if on more than two antipsychotics:  Only on his clozapine  Side effects if any:  None    Risks , benefits, side effects and precautions of medications discussed with patient and reminded patient to let us know any problems with medications     Objective:     Vital Signs:  Vitals:    09/09/19 1605 09/09/19 2010 09/09/19 2130 09/10/19 0700   BP: 120/66 113/64  126/71   BP Location: Left arm Right arm  Right arm   Pulse: 82 101  97   Resp: 18 20  18   Temp: (!) 97 3 °F (36 3 °C) 99 2 °F (37 3 °C)  (!) 97 4 °F (36 3 °C)   TempSrc: Temporal Temporal     SpO2: 92% 97% 95%    Weight:       Height:         Appearance:  age appropriate, casually dressed and overweight older than stated age   Behavior:  deviant, evasive and guarded and suspicious but with good eye contact   Speech:  normal pitch and normal volume but tends to become loud at times   Mood:  anxious and dysthymic   Affect:  mood-congruent   Thought Process:  goal directed and illogical slightly pressured but able to be redirected and talks as if in a court of low being stilted   Thought Content:  delusions  grandiose and somatic about not being able to breathe despite being on nasal oxygen and paranoid about people out to harm her and Atrovent inhaler tainteded with peanut oil  No current suicidal homicidal thoughts intent or plans reported    No phobias obsessions or compulsions reported   Perceptual Disturbances: None and does not appear responding to internal stimuli   Risk Potential: Tendency to not care for herself if she goes off treatment   Sensorium:  person, place, time/date, situation, day of week, month of year and time   Cognition:  grossly intact   Consciousness:  alert and awake    Attention: Intact concentration and attention span   Intellect: Considered to be at least of average intelligence   Insight:  limited in denial   Judgment: poor      Motor Activity: no abnormal movements         Recent Labs:  Results Reviewed     None          I/O Past 24 hours:  No intake/output data recorded  No intake/output data recorded  Assessment / Plan:     Schizoaffective disorder, bipolar [facility]      Reason for continued inpatient care:  Because of underlying paranoia and refusal to go back to the personal care home and inability to care for herself on her own  Acceptance by patient:  Accepting  Arian Fitzpatrick in recovery:  About living in another personal care home once she feels better  Understanding of medications :  Has some understanding  Involved in reintegration process:  Adjusting to the unit  Trusting in relatoinship with psychiatrist:  Trusting    Recommended Treatment:    Medication changes:  1) none today  Non-pharmacological treatments  1) continue with  individual therapy group therapy, milieu therapy and occupational therapy and milieu therapy involving multidisciplinary team approach with psychotherapist, case management, nursing, peer support services, art therapist etc using recovery principles and psycho-education about accepting illness and need for treatment   3) encourage to attend to ADLs like taking showers and wearing clean clothes   4) Encourage to attend groups   Safety  1) Safety/communication plan established targeting dynamic risk factors above    Discharge Plan most likely back to the a:  Personal care boarding home with act services once her delusions are managed and mood becomes more stabilized and she becomes more receptive to treatment compliance    Counseling / Coordination of Care    Total floor / unit time spent today 15 minutes  Greater than 50% of total time was spent with the patient and / or family counseling and / or coordination of care  A description of the counseling / coordination of care  Patient's Rights, confidentiality and exceptions to confidentiality, use of automated medical record, [facility] staff access to medical record, and consent to treatment reviewed      Alirio Frazier MD

## 2019-09-11 NOTE — PLAN OF CARE
Problem: Alteration in Thoughts and Perception  Goal: Agree to be compliant with medication regime, as prescribed and report medication side effects  Description  Interventions:  - Offer appropriate PRN medication and supervise ingestion; conduct aims, as needed   Outcome: Progressing     Problem: PAIN - ADULT  Goal: Verbalizes/displays adequate comfort level or baseline comfort level  Description  Interventions:  - Encourage patient to monitor pain and request assistance  - Assess pain using appropriate pain scale  - Administer analgesics based on type and severity of pain and evaluate response  - Implement non-pharmacological measures as appropriate and evaluate response  - Consider cultural and social influences on pain and pain management  - Notify physician/advanced practitioner if interventions unsuccessful or patient reports new pain  Outcome: Progressing     Problem: SAFETY ADULT  Goal: Patient will remain free of falls  Description  INTERVENTIONS:  - Assess patient frequently for physical needs  -  Identify cognitive and physical deficits and behaviors that affect risk of falls  -  [place] fall precautions as indicated by assessment   - Educate patient/family on patient safety including physical limitations  - Instruct patient to call for assistance with activity based on assessment  - Modify environment to reduce risk of injury  - Consider OT/PT consult to assist with strengthening/mobility  Outcome: Progressing   The patient slept through the night with no behaviors or issues noted  Oxygen maintained at 1 liter via NC while in bed  No s/s respiratory distress noted  Fall precautions maintained  Med compliant  Continue to monitor

## 2019-09-12 LAB — GLUCOSE SERPL-MCNC: 102 MG/DL (ref 65–140)

## 2019-09-12 PROCEDURE — 99231 SBSQ HOSP IP/OBS SF/LOW 25: CPT | Performed by: PSYCHIATRY & NEUROLOGY

## 2019-09-12 PROCEDURE — 82948 REAGENT STRIP/BLOOD GLUCOSE: CPT

## 2019-09-12 RX ADMIN — MONTELUKAST SODIUM 10 MG: 10 TABLET, COATED ORAL at 21:27

## 2019-09-12 RX ADMIN — CLOZAPINE 50 MG: 25 TABLET ORAL at 21:28

## 2019-09-12 RX ADMIN — CLOZAPINE 50 MG: 25 TABLET ORAL at 17:19

## 2019-09-12 RX ADMIN — THEOPHYLLINE ANHYDROUS 200 MG: 200 CAPSULE, EXTENDED RELEASE ORAL at 08:42

## 2019-09-12 RX ADMIN — FLUOXETINE 10 MG: 10 CAPSULE ORAL at 08:42

## 2019-09-12 RX ADMIN — PANTOPRAZOLE SODIUM 40 MG: 40 TABLET, DELAYED RELEASE ORAL at 06:11

## 2019-09-12 RX ADMIN — FLUTICASONE FUROATE AND VILANTEROL TRIFENATATE 1 PUFF: 200; 25 POWDER RESPIRATORY (INHALATION) at 08:42

## 2019-09-12 RX ADMIN — TIOTROPIUM BROMIDE 18 MCG: 18 CAPSULE ORAL; RESPIRATORY (INHALATION) at 08:43

## 2019-09-12 RX ADMIN — CLOZAPINE 50 MG: 25 TABLET ORAL at 08:42

## 2019-09-12 RX ADMIN — LEVOTHYROXINE SODIUM 125 MCG: 125 TABLET ORAL at 06:11

## 2019-09-12 NOTE — PROGRESS NOTES
Progress Note - Eric Harrison 1955, 60 y o  female MRN: [id number]    [facility] [address] Encounter: [id number]    Primary Care Provider: Dano Barriga MD   Date and time admitted to hospital: 7/23/2019  5:30 PM        * Schizoaffective disorder, bipolar type [facility])  Assessment & Plan  Psychiatry Progress Note    Subjective: Interval History     Patient is still refusing showers since the 28 th of last month or attend groups at all times despite claiming that she will start attending and take showers  She also has a foul body odor and has to eat separate from her peers because peers and now complaining about the odor she has  She is still  suspicious and claims that she does not feel quite safe on the unit and remained suspicious about certain staff who gives her medications like her inhalers and the spirometer  She still complains of not breathing properly despite breathing appropriately and having nasal oxygen on at night with acceptable pulse ox when checked by staff  She continues to present with stilted speech being too formal as if talking in a court of law     She does not admit to any overt hallucinations or paranoia but still appears to harbor some covert  paranoia based on her demeanor and interaction  She continues to talk about 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 this is believe has unchanged in a while    She has chosen not to to use the portable oxygen to get out of bed and go to groups if necessary  She remains guarded suspicious evasive and in denial of her illness on an ongoing basis  She has been in touch with her sister who also try to get her get out of bed and take a shower but that has not been successful yet  No current suicidal homicidal thoughts intent or plans reported  No overt hallucinations or other delusions reported   No signs or sxs of agranulocytosis or myocarditis or endocarditis and she is moving her bowels so far with no issues  She is still passive-aggressive and refuses to get out of bed or take showers or attend groups despite claiming that she would and I have been reminding her constantly that she needs to at least attend to her ADLs skills   Current medications:    Current Facility-Administered Medications:     acetaminophen (TYLENOL) tablet 650 mg, 650 mg, Oral, Q6H PRN, Shaggy Rangel MD    albuterol (PROVENTIL HFA,VENTOLIN HFA) inhaler 2 puff, 2 puff, Inhalation, Q4H PRN, Shaggy Rangel MD, 2 puff at 07/25/19 1200    aluminum-magnesium hydroxide-simethicone (MYLANTA) 200-200-20 mg/5 mL oral suspension 15 mL, 15 mL, Oral, Q4H PRN, Shaggy Rangel MD    ammonium lactate (LAC-HYDRIN) 12 % lotion 1 application, 1 application, Topical, BID PRN, Shaggy Rangel MD    benzonatate (TESSALON PERLES) capsule 100 mg, 100 mg, Oral, TID PRN, Shaggy Rangel MD    benztropine (COGENTIN) injection 1 mg, 1 mg, Intramuscular, Q8H PRN, Shaggy Rangel MD    cloZAPine (CLOZARIL) tablet 50 mg, 50 mg, Oral, TID, Shaggy Rangel MD, 50 mg at 09/12/19 0842    EPINEPHrine PF (ADRENALIN) 1 mg/mL injection 0 15 mg, 0 15 mg, Intramuscular, Once PRN, Shaggy Rangel MD    FLUoxetine (PROzac) capsule 10 mg, 10 mg, Oral, Daily, Shaggy Rangel MD, 10 mg at 09/12/19 0842    fluticasone-vilanterol (BREO ELLIPTA) 200-25 MCG/INH inhaler 1 puff, 1 puff, Inhalation, Daily, Shaggy Rangel MD, 1 puff at 09/12/19 0842    ketotifen (ZADITOR) 0 025 % ophthalmic solution 1 drop, 1 drop, Right Eye, BID PRN, Shaggy Rangel MD    levothyroxine tablet 125 mcg, 125 mcg, Oral, Early Morning, Shaggy Rangel MD, 125 mcg at 09/12/19 8290    magnesium hydroxide (MILK OF MAGNESIA) 400 mg/5 mL oral suspension 30 mL, 30 mL, Oral, Daily PRN, Shaggy Rangel MD    montelukast (SINGULAIR) tablet 10 mg, 10 mg, Oral, HS, Shaggy Rangel MD, 10 mg at 09/11/19 2137    OLANZapine (ZyPREXA) IM injection 5 mg, 5 mg, Intramuscular, Q8H PRN, Shaggy Rangel MD    OLANZapine (ZyPREXA) tablet 5 mg, 5 mg, Oral, Q8H PRN, Roberto Shankar MD    pantoprazole (PROTONIX) EC tablet 40 mg, 40 mg, Oral, Early Morning, Roberto Shankar MD, 40 mg at 09/12/19 9861    polyethylene glycol (MIRALAX) packet 17 g, 17 g, Oral, Daily PRN, Roberto Shankar MD    polyvinyl alcohol (LIQUIFILM TEARS) 1 4 % ophthalmic solution 1 drop, 1 drop, Both Eyes, Q3H PRN, Roberto Shankar MD    theophylline (JEF-24) 24 hr capsule 200 mg, 200 mg, Oral, Daily, Roberto Shankar MD, 200 mg at 09/12/19 0842    tiotropium (SPIRIVA) capsule for inhaler 18 mcg, 18 mcg, Inhalation, Daily, Roberto Shankar MD, 18 mcg at 09/12/19 0843    traZODone (DESYREL) tablet 25 mg, 25 mg, Oral, HS PRN, Roberto Shankar MD  Justification if on more than two antipsychotics:  Only on his clozapine  Side effects if any:  None    Risks , benefits, side effects and precautions of medications discussed with patient and reminded patient to let us know any problems with medications     Objective:     Vital Signs:  Vitals:    09/11/19 1900 09/11/19 2130 09/12/19 0730 09/12/19 0732   BP: 116/72  100/68 114/65   BP Location: Left arm  Left arm Left arm   Pulse: 91  95 100   Resp: 17  18    Temp: 97 8 °F (36 6 °C)  (!) 97 3 °F (36 3 °C)    TempSrc: Temporal  Temporal    SpO2: 92% 92%     Weight:       Height:         Appearance:  age appropriate, casually dressed and overweight older than stated age   Behavior:  deviant, evasive and guarded and suspicious but with good eye contact   Speech:  normal pitch and normal volume but tends to become loud at times   Mood:  anxious and dysthymic   Affect:  mood-congruent   Thought Process:  goal directed and illogical slightly pressured but able to be redirected and talks as if in a court of low being stilted   Thought Content:  delusions  grandiose and somatic about not being able to breathe despite being on nasal oxygen and paranoid about people out to harm her and Atrovent inhaler tainteded with peanut oil  No current suicidal homicidal thoughts intent or plans reported    No phobias obsessions or compulsions reported   Perceptual Disturbances: None and does not appear responding to internal stimuli   Risk Potential: Tendency to not care for herself if she goes off treatment   Sensorium:  person, place, time/date, situation, day of week, month of year and time   Cognition:  grossly intact   Consciousness:  alert and awake    Attention: Intact concentration and attention span   Intellect: Considered to be at least of average intelligence   Insight:  limited in denial   Judgment: poor      Motor Activity: no abnormal movements         Recent Labs:  Results Reviewed     None          I/O Past 24 hours:  No intake/output data recorded  No intake/output data recorded  Assessment / Plan:     [facility]oaffective [facility]      Reason for continued inpatient care:  Because of underlying paranoia and refusal to go back to the personal care home and inability to care for herself on her own  Acceptance by patient:  Accepting  Colette Castrejon in recovery:  About living in another personal care home once she feels better  Understanding of medications :  Has some understanding  Involved in reintegration process:  Adjusting to the unit  Trusting in relatoinship with psychiatrist:  Trusting    Recommended Treatment:    Medication changes:  1) none today  Non-pharmacological treatments  1) continue with  individual therapy group therapy, milieu therapy and occupational therapy and milieu therapy involving multidisciplinary team approach with psychotherapist, case management, nursing, peer support services, art therapist etc using recovery principles and psycho-education about accepting illness and need for treatment   3) encourage to attend to ADLs like taking showers and wearing clean clothes   4) Encourage to attend groups   Safety  1) Safety/communication plan established targeting dynamic risk factors above    Discharge Plan most likely back to the a:  Personal care boarding home with act services once her delusions are managed and mood becomes more stabilized and she becomes more receptive to treatment compliance    Counseling / Coordination of Care    Total floor / unit time spent today 15 minutes  Greater than 50% of total time was spent with the patient and / or family counseling and / or coordination of care  A description of the counseling / coordination of care  Patient's Rights, confidentiality and exceptions to confidentiality, use of automated medical record, [facility] staff access to medical record, and consent to treatment reviewed      Deep Durand MD

## 2019-09-12 NOTE — PLAN OF CARE
Problem: Alteration in Thoughts and Perception  Goal: Verbalize thoughts and feelings  Description  Interventions:  - Promote a nonjudgmental and trusting relationship with the patient through active listening and therapeutic communication  - Assess patient's level of functioning, behavior and potential for risk  - Engage patient in 1 on 1 interactions for a minimum of 15 minutes each session  - Encourage patient to express fears, feelings, frustrations, and discuss symptoms    - [place] patient to reality, help patient recognize reality-based thinking   - Administer medications as ordered and assess for potential side effects  - Provide the patient education related to the signs and symptoms of the illness and desired effects of prescribed medications  Outcome: Progressing  Goal: Agree to be compliant with medication regime, as prescribed and report medication side effects  Description  Interventions:  - Offer appropriate PRN medication and supervise ingestion; conduct aims, as needed   Outcome: Progressing  Goal: Attend and participate in unit activities, including therapeutic, recreational, and educational groups  Description  Interventions:  - Provide therapeutic and educational activities daily, encourage attendance and participation, and document same in the medical record     CERTIFIED PEER SPECIALIST INTERVENTIONS:    Complete peer assessment with patient to assess their needs and identify their goals to complete while in the recovery program as well as once discharged into the community  Patient will complete WRAP Plan, Crisis Plan and 5 Life Domains  Patient will attend 50% of groups offered on the unit  Patient will complete a goal card weekly      Outcome: Progressing     Problem: Alteration in Thoughts and Perception  Goal: Complete daily ADLs, including personal hygiene independently, as able  Description  Interventions:  - Observe, teach, and assist patient with ADLS  - Monitor and promote a balance of rest/activity, with adequate nutrition and elimination   Outcome: Not Progressing     Problem: Ineffective Coping  Goal: Demonstrates healthy coping skills  Outcome: Not Progressing     Problem: Depression  Goal: Refrain from isolation  Description  Interventions:  - Develop a trusting relationship   - Encourage socialization   Outcome: Not Progressing     2020 Davidson Jorge continues to throw up road blocks & obstacles to doing her hygiene  After staff spent better part of an hour setting up shower for her, obtaining blow dryer, making sure available to help, accommodating her requests, she ultimately refused to shower  Stony angry affect when confronted on her myriad excuses  Acknowledged need to shower, but, unwilling  Grossly disheveled, malodorous  Refused her meal as disliked what was sent; again reminded to take responsibility for filling out menu daily  Did come to window for supper medicine  Isolates in room sitting or lying on bed  But, has gone out tonight to take part in PM Group on deck where it is hot & some humid   This after vetoing the heat & humidity in the shower which staff remedied by leaving bathroom door open into her room to vent, but, this did not meet her approval

## 2019-09-12 NOTE — PROGRESS NOTES
09/12/19 0900   Team Meeting   Meeting Type Daily Rounds   Team Members Present   Team Members Present Physician;Nurse; Other (Discipline and Name)   Physician Team Member Dr Ricardo Rivera Team Member Shashank Patricio RN   Other (Discipline and Name) DIANA Schmitz     Patient has multiple excuses regarding showering  Malodorous and somatic

## 2019-09-12 NOTE — PLAN OF CARE
Problem: Alteration in Thoughts and Perception  Goal: Treatment Goal: Gain control of psychotic behaviors/thinking, reduce/eliminate presenting symptoms and demonstrate improved reality functioning upon discharge  Outcome: Progressing  Goal: Verbalize thoughts and feelings  Description  Interventions:  - Promote a nonjudgmental and trusting relationship with the patient through active listening and therapeutic communication  - Assess patient's level of functioning, behavior and potential for risk  - Engage patient in 1 on 1 interactions for a minimum of 15 minutes each session  - Encourage patient to express fears, feelings, frustrations, and discuss symptoms    - [place] patient to reality, help patient recognize reality-based thinking   - Administer medications as ordered and assess for potential side effects  - Provide the patient education related to the signs and symptoms of the illness and desired effects of prescribed medications  Outcome: Progressing  Goal: Agree to be compliant with medication regime, as prescribed and report medication side effects  Description  Interventions:  - Offer appropriate PRN medication and supervise ingestion; conduct aims, as needed   Outcome: Progressing  Goal: Attend and participate in unit activities, including therapeutic, recreational, and educational groups  Description  Interventions:  - Provide therapeutic and educational activities daily, encourage attendance and participation, and document same in the medical record     CERTIFIED PEER SPECIALIST INTERVENTIONS:    Complete peer assessment with patient to assess their needs and identify their goals to complete while in the recovery program as well as once discharged into the community  Patient will complete WRAP Plan, Crisis Plan and [facility] [employer]  Patient will attend 50% of groups offered on the unit  Patient will complete a goal card weekly      Outcome: Progressing  Goal: Recognize dysfunctional thoughts, communicate reality-based thoughts at the time of discharge  Description  Interventions:  - Provide medication and psycho-education to assist patient in compliance and developing insight into his/her illness   Outcome: Progressing  Goal: Complete daily ADLs, including personal hygiene independently, as able  Description  Interventions:  - Observe, teach, and assist patient with ADLS  - Monitor and promote a balance of rest/activity, with adequate nutrition and elimination   Outcome: Progressing     Problem: Ineffective Coping  Goal: Identifies ineffective coping skills  Outcome: Progressing  Goal: Identifies healthy coping skills  Outcome: Progressing  Goal: Demonstrates healthy coping skills  Outcome: Progressing  Goal: Participates in unit activities  Description  Interventions:  - Provide therapeutic environment   - Provide required programming   - Redirect inappropriate behaviors   Outcome: Progressing  Goal: Patient/Family participate in treatment and DC plans  Description  Interventions:  - Provide therapeutic environment  Outcome: Progressing  Goal: Patient/Family verbalizes awareness of resources  Outcome: Progressing  Goal: Understands least restrictive measures  Description  Interventions:  - Utilize least restrictive behavior  Outcome: Progressing     Problem: Risk for Self Injury/Neglect  Goal: Treatment Goal: Remain safe during length of stay, learn and adopt new coping skills, and be free of self-injurious ideation, impulses and acts at the time of discharge  Outcome: Progressing  Goal: Verbalize thoughts and feelings  Description  Interventions:  - Assess and re-assess patient's lethality and potential for self-injury  - Engage patient in 1:1 interactions, daily, for a minimum of 15 minutes  - Encourage patient to express feelings, fears, frustrations, hopes  - Establish rapport/trust with patient   Outcome: Progressing  Goal: Refrain from harming self  Description  Interventions:  - Monitor patient closely, per order  - Develop a trusting relationship  - Supervise medication ingestion, monitor effects and side effects   Outcome: Progressing  Goal: Attend and participate in unit activities, including therapeutic, recreational, and educational groups  Description  Interventions:  - Provide therapeutic and educational activities daily, encourage attendance and participation, and document same in the medical record  - Obtain collateral information, encourage visitation and family involvement in care   Outcome: Progressing  Goal: Recognize maladaptive responses and adopt new coping mechanisms  Outcome: Progressing  Goal: Complete daily ADLs, including personal hygiene independently, as able  Description  Interventions:  - Observe, teach, and assist patient with ADLS  - Monitor and promote a balance of rest/activity, with adequate nutrition and elimination  Outcome: Progressing     Problem: Depression  Goal: Treatment Goal: Demonstrate behavioral control of depressive symptoms, verbalize feelings of improved mood/affect, and adopt new coping skills prior to discharge  Outcome: Progressing  Goal: Verbalize thoughts and feelings  Description  Interventions:  - Assess and re-assess patient's level of risk   - Engage patient in 1:1 interactions, daily, for a minimum of 15 minutes   - Encourage patient to express feelings, fears, frustrations, hopes   Outcome: Progressing  Goal: Refrain from isolation  Description  Interventions:  - Develop a trusting relationship   - Encourage socialization   Outcome: Progressing  Goal: Refrain from self-neglect  Outcome: Progressing     Problem: Anxiety  Goal: Anxiety is at manageable level  Description  Interventions:  - Assess and monitor patient's anxiety level  - Monitor for signs and symptoms of anxiety both physical and emotional (heart palpitations, chest pain, shortness of breath, headaches, nausea, feeling jumpy, restlessness, irritable, apprehensive)     - Collaborate with interdisciplinary team and initiate plan and interventions as ordered  - [place] patient to unit/surroundings  - Explain treatment plan  - Encourage participation in care  - Encourage verbalization of concerns/fears  - Identify coping mechanisms  - Assist in developing anxiety-reducing skills  - Administer/offer alternative therapies  - Limit or eliminate stimulants  Outcome: Progressing     Problem: Alteration in Orientation  Goal: Interact with staff daily  Description  Interventions:  - Assess and re-assess patient's level of orientation  - Engage patient in 1 on 1 interactions, daily, for a minimum of 15 minutes   - Establish rapport/trust with patient   Outcome: Progressing  Goal: Cooperate with recommended testing/procedures  Description  Interventions:  - Determine need for ancillary testing  - Observe for mental status changes  - Implement falls/precaution protocol   Outcome: Progressing     Problem: PAIN - ADULT  Goal: Verbalizes/displays adequate comfort level or baseline comfort level  Description  Interventions:  - Encourage patient to monitor pain and request assistance  - Assess pain using appropriate pain scale  - Administer analgesics based on type and severity of pain and evaluate response  - Implement non-pharmacological measures as appropriate and evaluate response  - Consider cultural and social influences on pain and pain management  - Notify physician/advanced practitioner if interventions unsuccessful or patient reports new pain  Outcome: Progressing     Problem: SAFETY ADULT  Goal: Patient will remain free of falls  Description  INTERVENTIONS:  - Assess patient frequently for physical needs  -  Identify cognitive and physical deficits and behaviors that affect risk of falls    -  [place] fall precautions as indicated by assessment   - Educate patient/family on patient safety including physical limitations  - Instruct patient to call for assistance with activity based on assessment  - Modify environment to reduce risk of injury  - Consider OT/PT consult to assist with strengthening/mobility  Outcome: Progressing     Problem: RESPIRATORY - ADULT  Goal: Achieves optimal ventilation and oxygenation  Description  INTERVENTIONS:  - Assess for changes in respiratory status  - Assess for changes in mentation and behavior  - Position to facilitate oxygenation and minimize respiratory effort  - Oxygen administration by appropriate delivery method based on oxygen saturation (per order) or ABGs  - Initiate smoking cessation education as indicated  - Encourage broncho-pulmonary hygiene including cough, deep breathe, Incentive Spirometry  - Assess the need for suctioning and aspirate as needed  - Assess and instruct to report SOB or any respiratory difficulty  - Respiratory Therapy support as indicated  Outcome: Progressing     Problem: DISCHARGE PLANNING  Goal: Discharge to home or other facility with appropriate resources  Description  INTERVENTIONS:  - Conduct assessment to determine patient/family and health care team treatment goals, and need for post-acute services based on payer coverage, community resources, and patient preferences, and barriers to discharge  - Address psychosocial, clinical, and financial barriers to discharge as identified in assessment in conjunction with the patient/family and health care team  - Assist the patient in reintegration back into the community by removing barriers which may hinder a successful discharge once deemed stable  - Arrange appropriate level of post-acute services according to patient's needs and preference and payer coverage in collaboration with the physician and health care team  - Communicate with and update the patient/family, physician, and health care team regarding progress on the discharge plan  - Arrange appropriate transportation to post-acute venues    Outcome: Progressing     9/12/19 Shift 7-3  BM-12  Shower 12 including hair wash   Groups:  IMR  Meals: 0% breakfast, 75% lunch  Withdrawn and isolative to room and self  At beginning of shift that body odor was offensive and shower was required  Until showering patient would eat seperately from peers as complaints were being received  Patient agreed, with resistance, to comply and showered using shower chair  Patient reports washing hair and total body from head to legs  She stated that she had difficulty washing feet  Patient dressed and attended [facility]  Incentive spirometer was not utilized  Patient denies pain, discomfort and distress  No c/o s/s depression or anxiety

## 2019-09-12 NOTE — PROGRESS NOTES
[address] did have an HS snack, came up on own for HS medicine  Has performed the Incentive Spirometry reaching volumes of 900-1000ml  Says recognizes the shower issue as "my problem", not the staff's  Is wearing her QHS humidified nasal O2 @ 1L for bed now

## 2019-09-12 NOTE — PLAN OF CARE
Problem: PAIN - ADULT  Goal: Verbalizes/displays adequate comfort level or baseline comfort level  Description  Interventions:  - Encourage patient to monitor pain and request assistance  - Assess pain using appropriate pain scale  - Administer analgesics based on type and severity of pain and evaluate response  - Implement non-pharmacological measures as appropriate and evaluate response  - Consider cultural and social influences on pain and pain management  - Notify physician/advanced practitioner if interventions unsuccessful or patient reports new pain  Outcome: Progressing     Problem: SAFETY ADULT  Goal: Patient will remain free of falls  Description  INTERVENTIONS:  - Assess patient frequently for physical needs  -  Identify cognitive and physical deficits and behaviors that affect risk of falls  -  [place] fall precautions as indicated by assessment   - Educate patient/family on patient safety including physical limitations  - Instruct patient to call for assistance with activity based on assessment  - Modify environment to reduce risk of injury  - Consider OT/PT consult to assist with strengthening/mobility  Outcome: Progressing     Problem: RESPIRATORY - ADULT  Goal: Achieves optimal ventilation and oxygenation  Description  INTERVENTIONS:  - Assess for changes in respiratory status  - Assess for changes in mentation and behavior  - Position to facilitate oxygenation and minimize respiratory effort  - Oxygen administration by appropriate delivery method based on oxygen saturation (per order) or ABGs  - Initiate smoking cessation education as indicated  - Encourage broncho-pulmonary hygiene including cough, deep breathe, Incentive Spirometry  - Assess the need for suctioning and aspirate as needed  - Assess and instruct to report SOB or any respiratory difficulty  - Respiratory Therapy support as indicated  Outcome: Progressing   The patient slept for long intervals through the night   She awoke briefly at 0410 and requested to have her blood sugar taken, following she returned to bed and was back asleep shortly after  No other issues noted  Oxygen maintained at 1L via NC while in bed  No s/s respiratory distress noted  Fall precautions maintained  Med compliant  Continue to monitor

## 2019-09-12 NOTE — ASSESSMENT & PLAN NOTE
Psychiatry Progress Note    Subjective: Interval History     Patient is still refusing showers since the 28 th of last month or attend groups at all times despite claiming that she will start attending and take showers  She also has a foul body odor and has to eat separate from her peers because peers and now complaining about the odor she has  She is still  suspicious and claims that she does not feel quite safe on the unit and remained suspicious about certain staff who gives her medications like her inhalers and the spirometer  She still complains of not breathing properly despite breathing appropriately and having nasal oxygen on at night with acceptable pulse ox when checked by staff  She continues to present with stilted speech being too formal as if talking in a court of law     She does not admit to any overt hallucinations or paranoia but still appears to harbor some covert  paranoia based on her demeanor and interaction  She continues to talk about 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 this is believe has unchanged in a while    She has chosen not to to use the portable oxygen to get out of bed and go to groups if necessary  She remains guarded suspicious evasive and in denial of her illness on an ongoing basis  She has been in touch with her sister who also try to get her get out of bed and take a shower but that has not been successful yet  No current suicidal homicidal thoughts intent or plans reported  No overt hallucinations or other delusions reported   No signs or sxs of agranulocytosis or myocarditis or endocarditis and she is moving her bowels so far with no issues    She is still passive-aggressive and refuses to get out of bed or take showers or attend groups despite claiming that she would and I have been reminding her constantly that she needs to at least attend to her ADLs skills   Current medications:    Current Facility-Administered Medications:    acetaminophen (TYLENOL) tablet 650 mg, 650 mg, Oral, Q6H PRN, Sarah Hanna MD    albuterol (PROVENTIL HFA,VENTOLIN HFA) inhaler 2 puff, 2 puff, Inhalation, Q4H PRN, Sarah Hanna MD, 2 puff at 07/25/19 1200    aluminum-magnesium hydroxide-simethicone (MYLANTA) 200-200-20 mg/5 mL oral suspension 15 mL, 15 mL, Oral, Q4H PRN, Sarah Hanna MD    ammonium lactate (LAC-HYDRIN) 12 % lotion 1 application, 1 application, Topical, BID PRN, Sarah Hanna MD    benzonatate (TESSALON PERLES) capsule 100 mg, 100 mg, Oral, TID PRN, Sarah Hanna MD    benztropine (COGENTIN) injection 1 mg, 1 mg, Intramuscular, Q8H PRN, Sarah Hanna MD    cloZAPine (CLOZARIL) tablet 50 mg, 50 mg, Oral, TID, Sarah Hanna MD, 50 mg at 09/12/19 0842    EPINEPHrine PF (ADRENALIN) 1 mg/mL injection 0 15 mg, 0 15 mg, Intramuscular, Once PRN, Sarah Hanna MD    FLUoxetine (PROzac) capsule 10 mg, 10 mg, Oral, Daily, Sarah Hanna MD, 10 mg at 09/12/19 0842    fluticasone-vilanterol (BREO ELLIPTA) 200-25 MCG/INH inhaler 1 puff, 1 puff, Inhalation, Daily, Sarah Hanna MD, 1 puff at 09/12/19 0842    ketotifen (ZADITOR) 0 025 % ophthalmic solution 1 drop, 1 drop, Right Eye, BID PRN, Sarah Hanna MD    levothyroxine tablet 125 mcg, 125 mcg, Oral, Early Morning, Sarah Hanna MD, 125 mcg at 09/12/19 0194    magnesium hydroxide (MILK OF MAGNESIA) 400 mg/5 mL oral suspension 30 mL, 30 mL, Oral, Daily PRN, Sarah Hanna MD    montelukast (SINGULAIR) tablet 10 mg, 10 mg, Oral, HS, Sarah Hanna MD, 10 mg at 09/11/19 2137    OLANZapine (ZyPREXA) IM injection 5 mg, 5 mg, Intramuscular, Q8H PRN, Sarah Hanna MD    OLANZapine (ZyPREXA) tablet 5 mg, 5 mg, Oral, Q8H PRN, Sarah Hanna MD    pantoprazole (PROTONIX) EC tablet 40 mg, 40 mg, Oral, Early Morning, Sarah Hanna MD, 40 mg at 09/12/19 6271    polyethylene glycol (MIRALAX) packet 17 g, 17 g, Oral, Daily PRN, Sarah Hanna MD    polyvinyl alcohol (LIQUIFILM TEARS) 1 4 % ophthalmic solution 1 drop, 1 drop, Both Eyes, Q3H PRN, Deep Durand MD    theophylline (JEF-24) 24 hr capsule 200 mg, 200 mg, Oral, Daily, Deep Durand MD, 200 mg at 09/12/19 0842    tiotropium (SPIRIVA) capsule for inhaler 18 mcg, 18 mcg, Inhalation, Daily, Deep Durand MD, 18 mcg at 09/12/19 0843    traZODone (DESYREL) tablet 25 mg, 25 mg, Oral, HS PRN, Deep Durand MD  Justification if on more than two antipsychotics:  Only on his clozapine  Side effects if any:  None    Risks , benefits, side effects and precautions of medications discussed with patient and reminded patient to let us know any problems with medications     Objective:     Vital Signs:  Vitals:    09/11/19 1900 09/11/19 2130 09/12/19 0730 09/12/19 0732   BP: 116/72  100/68 114/65   BP Location: Left arm  Left arm Left arm   Pulse: 91  95 100   Resp: 17  18    Temp: 97 8 °F (36 6 °C)  (!) 97 3 °F (36 3 °C)    TempSrc: Temporal  Temporal    SpO2: 92% 92%     Weight:       Height:         Appearance:  age appropriate, casually dressed and overweight older than stated age   Behavior:  deviant, evasive and guarded and suspicious but with good eye contact   Speech:  normal pitch and normal volume but tends to become loud at times   Mood:  anxious and dysthymic   Affect:  mood-congruent   Thought Process:  goal directed and illogical slightly pressured but able to be redirected and talks as if in a court of low being stilted   Thought Content:  delusions  grandiose and somatic about not being able to breathe despite being on nasal oxygen and paranoid about people out to harm her and Atrovent inhaler tainteded with peanut oil  No current suicidal homicidal thoughts intent or plans reported    No phobias obsessions or compulsions reported   Perceptual Disturbances: None and does not appear responding to internal stimuli   Risk Potential: Tendency to not care for herself if she goes off treatment   Sensorium:  person, place, time/date, situation, day of week, month of year and time Cognition:  grossly intact   Consciousness:  alert and awake    Attention: Intact concentration and attention span   Intellect: Considered to be at least of average intelligence   Insight:  limited in denial   Judgment: poor      Motor Activity: no abnormal movements         Recent Labs:  Results Reviewed     None          I/O Past 24 hours:  No intake/output data recorded  No intake/output data recorded  Assessment / Plan:     Schizoaffective disorder, bipolar type ([facility] [address] )      Reason for continued inpatient care:  Because of underlying paranoia and refusal to go back to the personal care home and inability to care for herself on her own  Acceptance by patient:  Accepting  Audelia Arthur in recovery:  About living in another personal care home once she feels better  Understanding of medications :  Has some understanding  Involved in reintegration process:  Adjusting to the unit  Trusting in relatoinship with psychiatrist:  Trusting    Recommended Treatment:    Medication changes:  1) none today  Non-pharmacological treatments  1) continue with  individual therapy group therapy, milieu therapy and occupational therapy and milieu therapy involving multidisciplinary team approach with psychotherapist, case management, nursing, peer support services, art therapist etc using recovery principles and psycho-education about accepting illness and need for treatment   3) encourage to attend to ADLs like taking showers and wearing clean clothes   4) Encourage to attend groups   Safety  1) Safety/communication plan established targeting dynamic risk factors above  Discharge Plan most likely back to the a:  Personal care boarding home with act services once her delusions are managed and mood becomes more stabilized and she becomes more receptive to treatment compliance    Counseling / Coordination of Care    Total floor / unit time spent today 15 minutes   Greater than 50% of total time was spent with the patient and / or family counseling and / or coordination of care  A description of the counseling / coordination of care  Patient's Rights, confidentiality and exceptions to confidentiality, use of automated medical record, [facility] staff access to medical record, and consent to treatment reviewed      Shilpa Trujillo MD

## 2019-09-13 LAB
BASOPHILS # BLD AUTO: 0 THOUSANDS/ΜL (ref 0–0.1)
BASOPHILS NFR BLD AUTO: 1 % (ref 0–1)
EOSINOPHIL # BLD AUTO: 0.1 THOUSAND/ΜL (ref 0–0.4)
EOSINOPHIL NFR BLD AUTO: 2 % (ref 0–6)
ERYTHROCYTE [DISTWIDTH] IN BLOOD BY AUTOMATED COUNT: 14.2 %
HCT VFR BLD AUTO: 38.3 % (ref 36–46)
HGB BLD-MCNC: 12.8 G/DL (ref 12–16)
LYMPHOCYTES # BLD AUTO: 2.1 THOUSANDS/ΜL (ref 0.5–4)
LYMPHOCYTES NFR BLD AUTO: 39 % (ref 25–45)
MCH RBC QN AUTO: 31.1 PG (ref 26–34)
MCHC RBC AUTO-ENTMCNC: 33.3 G/DL (ref 31–36)
MCV RBC AUTO: 93 FL (ref 80–100)
MONOCYTES # BLD AUTO: 0.6 THOUSAND/ΜL (ref 0.2–0.9)
MONOCYTES NFR BLD AUTO: 12 % (ref 1–10)
NEUTROPHILS # BLD AUTO: 2.5 THOUSANDS/ΜL (ref 1.8–7.8)
NEUTS SEG NFR BLD AUTO: 47 % (ref 45–65)
PLATELET # BLD AUTO: 226 THOUSANDS/UL (ref 150–450)
PMV BLD AUTO: 9.5 FL (ref 8.9–12.7)
RBC # BLD AUTO: 4.1 MILLION/UL (ref 4–5.2)
WBC # BLD AUTO: 5.4 THOUSAND/UL (ref 4.5–11)

## 2019-09-13 PROCEDURE — 85025 COMPLETE CBC W/AUTO DIFF WBC: CPT | Performed by: PSYCHIATRY & NEUROLOGY

## 2019-09-13 PROCEDURE — 99231 SBSQ HOSP IP/OBS SF/LOW 25: CPT | Performed by: PSYCHIATRY & NEUROLOGY

## 2019-09-13 RX ADMIN — CLOZAPINE 50 MG: 25 TABLET ORAL at 21:46

## 2019-09-13 RX ADMIN — PANTOPRAZOLE SODIUM 40 MG: 40 TABLET, DELAYED RELEASE ORAL at 05:58

## 2019-09-13 RX ADMIN — LEVOTHYROXINE SODIUM 125 MCG: 125 TABLET ORAL at 05:58

## 2019-09-13 RX ADMIN — CLOZAPINE 50 MG: 25 TABLET ORAL at 08:15

## 2019-09-13 RX ADMIN — TIOTROPIUM BROMIDE 18 MCG: 18 CAPSULE ORAL; RESPIRATORY (INHALATION) at 08:15

## 2019-09-13 RX ADMIN — MONTELUKAST SODIUM 10 MG: 10 TABLET, COATED ORAL at 21:46

## 2019-09-13 RX ADMIN — FLUOXETINE 10 MG: 10 CAPSULE ORAL at 08:16

## 2019-09-13 RX ADMIN — THEOPHYLLINE ANHYDROUS 200 MG: 200 CAPSULE, EXTENDED RELEASE ORAL at 08:15

## 2019-09-13 RX ADMIN — CLOZAPINE 50 MG: 25 TABLET ORAL at 17:38

## 2019-09-13 RX ADMIN — FLUTICASONE FUROATE AND VILANTEROL TRIFENATATE 1 PUFF: 200; 25 POWDER RESPIRATORY (INHALATION) at 08:15

## 2019-09-13 NOTE — PLAN OF CARE
Problem: Alteration in Thoughts and Perception  Goal: Verbalize thoughts and feelings  Description  Interventions:  - Promote a nonjudgmental and trusting relationship with the patient through active listening and therapeutic communication  - Assess patient's level of functioning, behavior and potential for risk  - Engage patient in 1 on 1 interactions for a minimum of 15 minutes each session  - Encourage patient to express fears, feelings, frustrations, and discuss symptoms    - [place] patient to reality, help patient recognize reality-based thinking   - Administer medications as ordered and assess for potential side effects  - Provide the patient education related to the signs and symptoms of the illness and desired effects of prescribed medications  Outcome: Progressing  Goal: Attend and participate in unit activities, including therapeutic, recreational, and educational groups  Description  Interventions:  - Provide therapeutic and educational activities daily, encourage attendance and participation, and document same in the medical record     CERTIFIED PEER SPECIALIST INTERVENTIONS:    Complete peer assessment with patient to assess their needs and identify their goals to complete while in the recovery program as well as once discharged into the community  Patient will complete WRAP Plan, Crisis Plan and 5 Life Domains  Patient will attend 50% of groups offered on the unit  Patient will complete a goal card weekly  Outcome: Progressing  Goal: Complete daily ADLs, including personal hygiene independently, as able  Description  Interventions:  - Observe, teach, and assist patient with ADLS  - Monitor and promote a balance of rest/activity, with adequate nutrition and elimination   Outcome: Progressing     Problem: Anxiety  Goal: Anxiety is at manageable level  Description  Interventions:  - Assess and monitor patient's anxiety level     - Monitor for signs and symptoms of anxiety both physical and emotional (heart palpitations, chest pain, shortness of breath, headaches, nausea, feeling jumpy, restlessness, irritable, apprehensive)  - Collaborate with interdisciplinary team and initiate plan and interventions as ordered  - [place] patient to unit/surroundings  - Explain treatment plan  - Encourage participation in care  - Encourage verbalization of concerns/fears  - Identify coping mechanisms  - Assist in developing anxiety-reducing skills  - Administer/offer alternative therapies  - Limit or eliminate stimulants  Outcome: Progressing     Problem: Alteration in Orientation  Goal: Interact with staff daily  Description  Interventions:  - Assess and re-assess patient's level of orientation  - Engage patient in 1 on 1 interactions, daily, for a minimum of 15 minutes   - Establish rapport/trust with patient   Outcome: Progressing     Problem: SAFETY ADULT  Goal: Patient will remain free of falls  Description  INTERVENTIONS:  - Assess patient frequently for physical needs  -  Identify cognitive and physical deficits and behaviors that affect risk of falls    -  [place] fall precautions as indicated by assessment   - Educate patient/family on patient safety including physical limitations  - Instruct patient to call for assistance with activity based on assessment  - Modify environment to reduce risk of injury  - Consider OT/PT consult to assist with strengthening/mobility  Outcome: Progressing     Problem: RESPIRATORY - ADULT  Goal: Achieves optimal ventilation and oxygenation  Description  INTERVENTIONS:  - Assess for changes in respiratory status  - Assess for changes in mentation and behavior  - Position to facilitate oxygenation and minimize respiratory effort  - Oxygen administration by appropriate delivery method based on oxygen saturation (per order) or ABGs  - Initiate smoking cessation education as indicated  - Encourage broncho-pulmonary hygiene including cough, deep breathe, Incentive Spirometry  - Assess the need for suctioning and aspirate as needed  - Assess and instruct to report SOB or any respiratory difficulty  - Respiratory Therapy support as indicated  Outcome: Progressing     Problem: Depression  Goal: Refrain from isolation  Description  Interventions:  - Develop a trusting relationship   - Encourage socialization   Outcome: Not aZinab Farfan has been in her room most of the shift  She is either sitting on her bed or napping  Said that the shower earlier in the day made her tired  Minimal interaction with peers  Pleasant and cooperative with staff  Able to make needs known  Did not do incentive spirometer at the start of the shift  Took pills before eating 50% of her meal  Did not attend groups this evening  Oxygen saturation 93-99% on room air earlier in the shift  No complaint or sign of respiratory distress  O2 saturation 90% prior to oxygen 1 liter via nasal cannula applied  Continue to monitor  Precautions maintained

## 2019-09-13 NOTE — PROGRESS NOTES
Pharmacy Clozapine Monitoring Progress Note     Libby Costello is receiving a total daily dose of 150 mg of clozapine and receiving it as 50mg three times daily  Pharmacist Recommendations Based on Assessment and Plan (below):    1  Patient is Clozapine-REMS eligible, ANC was updated, with weekly monitoring frequency and the next [address] is due on 9/20/2019     2  Recommend prophylactic bowel regimen  Assessment/Plan:    Phase of Treatment:     Current phase of treatment is initation/titration  Patient Clozapine REMS Eligibility:     Patient is eligible to receive clozapine and the [address] monitoring frequency is weekly per clozapine REMS  The patient's latest [address] value has been updated into the Clozapine-REMS program          ANC and Clozapine Level (if available)     The most recent [address] was   Lab Results   Component Value Date    NEUTROABS 2 50 09/13/2019    and the next lab is due on 9/20/19  Last Clozapine level (if available):    0   Lab Value Date/Time    CLOZAPINE 324 (L) 08/16/2019 1131     0   Lab Value Date/Time    NCLOZIP 207 08/16/2019 1131           Monitoring Parameters for Clozapine:     Clozapine Adverse Effect Suggested Monitoring Patient's Current Status    Agranulocytosis ANC baseline and then repeat weekly for first 6 months and every 2 weeks for the second 6 months  Maintenance after one year of therapy every month  The most recent [address] was   Lab Results   Component Value Date    NEUTROABS 2 50 09/13/2019       This ANC is considered normal range (ANC >/= 1500/mcL)  Continue current treatment and monitoring course  Respiratory Depression Please ensure patient is not on concomitant respiratory lowering medications such as benzodiazepines, sedative hypnotics (eg  zolpidem) This patient is not on respiratory depression lowering medications   Myocarditis Baseline and weekly EKG, CRP, BNP, and troponin    Weekly symptomatic complaints of fatigue, dyspnea, tachycardia, chest pain, palpitations, and fever for the first 4 weeks  Last EKG Results on  8/21/19 showed:  T wave abnormalities    Last QTC value was:  0   Lab Value Date/Time    QTCINT 427 08/21/2019 1133       Last BNP was: No results found for: NTBNP    Last Troponin was:  0   Lab Value Date/Time    TROPONINI <0 01 05/01/2019 1318    TROPONINI <0 04 05/10/2015 1948        Orthostatic hypotension/  bradycardia Blood pressure and vital signs      Monitor blood pressure and orthostatic hypotension at least twice daily upon initiation and continue to follow at least once daily afterwards  Patient's last recorded vital signs:       /75   Pulse 92   Temp 98 6 °F (37 °C) (Temporal)   Resp 16   Ht 5' 4" (1 626 m)   Wt 68 9 kg (151 lb 12 8 oz)   SpO2 93%   BMI 26 06 kg/m²             Constipation (bowel obstruction due to high anticholinergic clozapine burden) Assess at baseline and weekly during first four months of therapy  Docusate 100mg BID and Miralax 17 grams daily recommended initially  Prophylactic bowel regimen not ordered and it is recommended to order a standing bowel regimen to reduce risk of bowel obstruction associated with clozapine therapy  Sialorrhea Assess at baseline and weekly during first four months of therapy  May be managed using sublingual anticholinergic preparations (atropine 1% eye drops)  Patient is not experiencing sialorrhea  This will continue to be monitored  Please note that per current REMS protocol the provider can submit a treatment rationale that justifies clozapine treatment even if patient parameters are outside of REMS recommendations  The Clozapine REMS is an FDA-mandated program implemented by the manufacturers of clozapine  ([employer]  com/CpmgClozapineUI/home  u)  Pharmacy will continue to follow patient with team, thank you    Electronically signed by: Rissa De Oliveira, PharmD, [address], Clinical Pharmacist - Psychiatry

## 2019-09-13 NOTE — PROGRESS NOTES
09/12/19 1100   Activity/Group Checklist   Group Other (Comment)  (IMR - Suicide Prevention)   Attendance Attended   Attendance Duration (min) 46-60   Interactions Interacted appropriately   Affect/Mood Appropriate   Goals Achieved Able to listen to others; Displayed empathy     Patient remained on-task and did not stop the group to discuss somatic issues or unrelated arguments as she had earlier in the week with CPS

## 2019-09-13 NOTE — ASSESSMENT & PLAN NOTE
Psychiatry Progress Note    Subjective: Interval History     Patient did finally take shower yesterday with staff assistance but reluctantly after almost 2 weeks without showers  Today she appears clean with no bad body o emanating from her  She was told that she needs to keep up with her ADLs by taking showers at least every 2 days which is the expectation from her and she acknowledged that  She claims that she is somewhat up anxious about her brother who had a heart attack a few years back and she was having some anxiety about herself also having heart problems even though she does not report any symptoms  She continues to verbalize that she does not feel quite safe on the unit  She continues to tend to be suspicious about certain staff who gives her medications like her inhalers and the spirometer  She still complains of not breathing properly despite breathing appropriately and having nasal oxygen on at night with acceptable pulse ox when checked by staff  She continues to present with stilted speech being too formal as if talking in a court of law which has not changed either  She does not admit to any overt hallucinations or paranoia but still appears to harbor some covert  paranoia based on her demeanor and interaction  She continues to talk about 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  She has chosen not to to use the portable oxygen to get out of bed and go to groups if necessary  She remains guarded suspicious evasive and in denial of her illness on an ongoing basis  No current suicidal homicidal thoughts intent or plans reported  No overt hallucinations or other delusions reported   No signs or sxs of agranulocytosis or myocarditis or endocarditis and she is moving her bowels so far with no issues    She is still passive-aggressive and refuses to get out of bed or take showers or attend groups despite claiming that she would and I have been reminding her constantly that she needs to at least attend to her ADLs skills and should also start going for more groups  If she is still psychosomatic claiming that she feels tired most of the time and prefers to lay back on her bed     Current medications:    Current Facility-Administered Medications:     acetaminophen (TYLENOL) tablet 650 mg, 650 mg, Oral, Q6H PRN, Dalton Garner MD    albuterol (PROVENTIL HFA,VENTOLIN HFA) inhaler 2 puff, 2 puff, Inhalation, Q4H PRN, Dalton Garner MD, 2 puff at 07/25/19 1200    aluminum-magnesium hydroxide-simethicone (MYLANTA) 200-200-20 mg/5 mL oral suspension 15 mL, 15 mL, Oral, Q4H PRN, Dalton Garner MD    ammonium lactate (LAC-HYDRIN) 12 % lotion 1 application, 1 application, Topical, BID PRN, Dalton Garner MD    benzonatate (TESSALON PERLES) capsule 100 mg, 100 mg, Oral, TID PRN, Dalton Garner MD    benztropine (COGENTIN) injection 1 mg, 1 mg, Intramuscular, Q8H PRN, Dalton Garner MD    cloZAPine (CLOZARIL) tablet 50 mg, 50 mg, Oral, TID, Dalton Garner MD, 50 mg at 09/13/19 0815    EPINEPHrine PF (ADRENALIN) 1 mg/mL injection 0 15 mg, 0 15 mg, Intramuscular, Once PRN, Dalton Garner MD    FLUoxetine (PROzac) capsule 10 mg, 10 mg, Oral, Daily, Dalton Garner MD, 10 mg at 09/13/19 0816    fluticasone-vilanterol (BREO ELLIPTA) 200-25 MCG/INH inhaler 1 puff, 1 puff, Inhalation, Daily, Dalton Garner MD, 1 puff at 09/13/19 0815    ketotifen (ZADITOR) 0 025 % ophthalmic solution 1 drop, 1 drop, Right Eye, BID PRN, Dalton Garner MD    levothyroxine tablet 125 mcg, 125 mcg, Oral, Early Morning, Dalton Garner MD, 125 mcg at 09/13/19 0558    magnesium hydroxide (MILK OF MAGNESIA) 400 mg/5 mL oral suspension 30 mL, 30 mL, Oral, Daily PRN, Dalton Garner MD    montelukast (SINGULAIR) tablet 10 mg, 10 mg, Oral, HS, Dalton Garner MD, 10 mg at 09/12/19 2127    OLANZapine (ZyPREXA) IM injection 5 mg, 5 mg, Intramuscular, Q8H PRN, Dalton Garner MD    OLANZapine (ZyPREXA) tablet 5 mg, 5 mg, Oral, Q8H PRN, Zetta Killer, MD    pantoprazole (PROTONIX) EC tablet 40 mg, 40 mg, Oral, Early Morning, Manuel Copeland MD, 40 mg at 09/13/19 0558    polyethylene glycol (MIRALAX) packet 17 g, 17 g, Oral, Daily PRN, Manuel Copeland MD    polyvinyl alcohol (LIQUIFILM TEARS) 1 4 % ophthalmic solution 1 drop, 1 drop, Both Eyes, Q3H PRN, Manuel Copeland MD    theophylline (JEF-24) 24 hr capsule 200 mg, 200 mg, Oral, Daily, Manuel Copeland MD, 200 mg at 09/13/19 0815    tiotropium (SPIRIVA) capsule for inhaler 18 mcg, 18 mcg, Inhalation, Daily, Manuel Copeland MD, 18 mcg at 09/13/19 0815    traZODone (DESYREL) tablet 25 mg, 25 mg, Oral, HS PRN, Manuel Copeland MD  Justification if on more than two antipsychotics:  Only on his clozapine  Side effects if any:  None    Risks , benefits, side effects and precautions of medications discussed with patient and reminded patient to let us know any problems with medications     Objective:     Vital Signs:  Vitals:    09/12/19 0730 09/12/19 0732 09/12/19 1500 09/12/19 2005   BP: 100/68 114/65 128/70 106/64   BP Location: Left arm Left arm Left arm Left arm   Pulse: 95 100 90 95   Resp: 18  18 18   Temp: (!) 97 3 °F (36 3 °C)  98 °F (36 7 °C) 97 8 °F (36 6 °C)   TempSrc: Temporal  Temporal Temporal   SpO2:   98% 93%   Weight:       Height:         Appearance:  age appropriate, casually dressed and overweight older than stated age   Behavior:  deviant, evasive and guarded and suspicious but with good eye contact   Speech:  normal pitch and normal volume but tends to become loud at times   Mood:  anxious and dysthymic   Affect:  mood-congruent   Thought Process:  goal directed and illogical slightly pressured but able to be redirected and talks as if in a court of low being stilted   Thought Content:  delusions  grandiose and somatic about not being able to breathe despite being on nasal oxygen and paranoid about people out to harm her and Atrovent inhaler tainteded with peanut oil    No current suicidal homicidal thoughts intent or plans reported  No phobias obsessions or compulsions reported   Perceptual Disturbances: None and does not appear responding to internal stimuli   Risk Potential: Tendency to not care for herself if she goes off treatment   Sensorium:  person, place, time/date, situation, day of week, month of year and time   Cognition:  grossly intact   Consciousness:  alert and awake    Attention: Intact concentration and attention span   Intellect: Considered to be at least of average intelligence   Insight:  limited in denial   Judgment: poor      Motor Activity: no abnormal movements         Recent Labs:  Results Reviewed     None          I/O Past 24 hours:  No intake/output data recorded  No intake/output data recorded  Assessment / Plan:     Schizoaffective disorder, bipolar type ([facility] [address] )      Reason for continued inpatient care:  Because of underlying paranoia and refusal to go back to the personal care home and inability to care for herself on her own  Acceptance by patient:  Accepting  Patsy Benavidez in recovery:  About living in another personal care home once she feels better  Understanding of medications :  Has some understanding  Involved in reintegration process:  Adjusting to the unit  Trusting in relatoinship with psychiatrist:  Trusting    Recommended Treatment:    Medication changes:  1) none today  Non-pharmacological treatments  1) continue with  individual therapy group therapy, milieu therapy and occupational therapy and milieu therapy involving multidisciplinary team approach with psychotherapist, case management, nursing, peer support services, art therapist etc using recovery principles and psycho-education about accepting illness and need for treatment   3) encourage to attend to ADLs like taking showers and wearing clean clothes   4) Encourage to attend groups   Safety  1) Safety/communication plan established targeting dynamic risk factors above    Discharge Plan most likely back to the a: Personal care boarding home with act services once her delusions are managed and mood becomes more stabilized and she becomes more receptive to treatment compliance    Counseling / Coordination of Care    Total floor / unit time spent today 15 minutes  Greater than 50% of total time was spent with the patient and / or family counseling and / or coordination of care  A description of the counseling / coordination of care  Patient's Rights, confidentiality and exceptions to confidentiality, use of automated medical record, [facility] staff access to medical record, and consent to treatment reviewed      Dalton Garner MD

## 2019-09-13 NOTE — PROGRESS NOTES
09/13/19 0900   Team Meeting   Meeting Type Daily Rounds   Team Members Present   Team Members Present Physician;Nurse;; Other (Discipline and Name)   Physician Team Member Dr Tyra Herrera Team Member Abby Capps RN   Care Management Team Member Brenda Irwin   Other (Discipline and Name) DIANA Ho     Patient became somatic yesterday about her heart after hearing about her brother having open heart surgery  Showered yesterday  No other issues noted

## 2019-09-13 NOTE — PLAN OF CARE
Problem: PAIN - ADULT  Goal: Verbalizes/displays adequate comfort level or baseline comfort level  Description  Interventions:  - Encourage patient to monitor pain and request assistance  - Assess pain using appropriate pain scale  - Administer analgesics based on type and severity of pain and evaluate response  - Implement non-pharmacological measures as appropriate and evaluate response  - Consider cultural and social influences on pain and pain management  - Notify physician/advanced practitioner if interventions unsuccessful or patient reports new pain  Outcome: Progressing     Problem: SAFETY ADULT  Goal: Patient will remain free of falls  Description  INTERVENTIONS:  - Assess patient frequently for physical needs  -  Identify cognitive and physical deficits and behaviors that affect risk of falls    -  [place] fall precautions as indicated by assessment   - Educate patient/family on patient safety including physical limitations  - Instruct patient to call for assistance with activity based on assessment  - Modify environment to reduce risk of injury  - Consider OT/PT consult to assist with strengthening/mobility  Outcome: Progressing     Problem: RESPIRATORY - ADULT  Goal: Achieves optimal ventilation and oxygenation  Description  INTERVENTIONS:  - Assess for changes in respiratory status  - Assess for changes in mentation and behavior  - Position to facilitate oxygenation and minimize respiratory effort  - Oxygen administration by appropriate delivery method based on oxygen saturation (per order) or ABGs  - Initiate smoking cessation education as indicated  - Encourage broncho-pulmonary hygiene including cough, deep breathe, Incentive Spirometry  - Assess the need for suctioning and aspirate as needed  - Assess and instruct to report SOB or any respiratory difficulty  - Respiratory Therapy support as indicated  Outcome: Progressing    ~Maggie maintained on ongoing fall, aspiration and SAFE precaution without incident on this shift   Laying in bed with her eyes closed, breath even and unlabored with o2 @1L/m with the humidification   No sign or symptom of respiratory distress noted  Marah He continues to remain free from fall   Denies any pain or discomfort   Thus far she is in stabled condition   Q 15 minutes checks during the night continues   No behavioral noted    Will continue to monitor behavioral, respiratory, and sleep pattern    ~Maggie has a weekly scheduled lab to be obtain this morning: CBC w/Diff

## 2019-09-13 NOTE — PLAN OF CARE
Problem: Alteration in Thoughts and Perception  Goal: Verbalize thoughts and feelings  Description  Interventions:  - Promote a nonjudgmental and trusting relationship with the patient through active listening and therapeutic communication  - Assess patient's level of functioning, behavior and potential for risk  - Engage patient in 1 on 1 interactions for a minimum of 15 minutes each session  - Encourage patient to express fears, feelings, frustrations, and discuss symptoms    - [place] patient to reality, help patient recognize reality-based thinking   - Administer medications as ordered and assess for potential side effects  - Provide the patient education related to the signs and symptoms of the illness and desired effects of prescribed medications  Outcome: Progressing  Goal: Agree to be compliant with medication regime, as prescribed and report medication side effects  Description  Interventions:  - Offer appropriate PRN medication and supervise ingestion; conduct aims, as needed   Outcome: Progressing  Goal: Attend and participate in unit activities, including therapeutic, recreational, and educational groups  Description  Interventions:  - Provide therapeutic and educational activities daily, encourage attendance and participation, and document same in the medical record     CERTIFIED PEER SPECIALIST INTERVENTIONS:    Complete peer assessment with patient to assess their needs and identify their goals to complete while in the recovery program as well as once discharged into the community  Patient will complete WRAP Plan, Crisis Plan and 5 Life Domains  Patient will attend 50% of groups offered on the unit  Patient will complete a goal card weekly      Outcome: Progressing  Goal: Complete daily ADLs, including personal hygiene independently, as able  Description  Interventions:  - Observe, teach, and assist patient with ADLS  - Monitor and promote a balance of rest/activity, with adequate nutrition and elimination   Outcome: Not Progressing     Problem: Depression  Goal: Refrain from isolation  Description  Interventions:  - Develop a trusting relationship   - Encourage socialization   Outcome: Not Progressing     Problem: Alteration in Orientation  Goal: Interact with staff daily  Description  Interventions:  - Assess and re-assess patient's level of orientation  - Engage patient in 1 on 1 interactions, daily, for a minimum of 15 minutes   - Establish rapport/trust with patient   Outcome: Progressing    9/13/19 Shift 7-3  BM- 9/12  Shower 12   Groups: IMR  Appetite Intact   Withdrawn and isolative to room and self  Somatically concerned about her heart racing, upon assessment and vital signs were within normal limits, pt was able to be redirected  Preoccupied in her lab results, ensure that the proper staff member will tell her the results if needed  Makes needs known to staff as needed  Otherwise compliant and cooperative with her scheduled medications, meals and scheduled activities

## 2019-09-14 RX ADMIN — CLOZAPINE 50 MG: 25 TABLET ORAL at 08:17

## 2019-09-14 RX ADMIN — CLOZAPINE 50 MG: 25 TABLET ORAL at 21:30

## 2019-09-14 RX ADMIN — MONTELUKAST SODIUM 10 MG: 10 TABLET, COATED ORAL at 21:30

## 2019-09-14 RX ADMIN — FLUOXETINE 10 MG: 10 CAPSULE ORAL at 08:17

## 2019-09-14 RX ADMIN — THEOPHYLLINE ANHYDROUS 200 MG: 200 CAPSULE, EXTENDED RELEASE ORAL at 08:17

## 2019-09-14 RX ADMIN — TIOTROPIUM BROMIDE 18 MCG: 18 CAPSULE ORAL; RESPIRATORY (INHALATION) at 08:19

## 2019-09-14 RX ADMIN — PANTOPRAZOLE SODIUM 40 MG: 40 TABLET, DELAYED RELEASE ORAL at 05:58

## 2019-09-14 RX ADMIN — CLOZAPINE 50 MG: 25 TABLET ORAL at 17:46

## 2019-09-14 RX ADMIN — FLUTICASONE FUROATE AND VILANTEROL TRIFENATATE 1 PUFF: 200; 25 POWDER RESPIRATORY (INHALATION) at 08:19

## 2019-09-14 RX ADMIN — LEVOTHYROXINE SODIUM 125 MCG: 125 TABLET ORAL at 05:58

## 2019-09-14 NOTE — PROGRESS NOTES
2145 Chuy Hahn did come out for HS snack  Was punctual for HS medicine  Used her Incentive Spirometer reaching consistent volumes of 900-1000ml  Wearing the QHS humidified nasal O2 @ 1L now for bed

## 2019-09-14 NOTE — PLAN OF CARE
Problem: PAIN - ADULT  Goal: Verbalizes/displays adequate comfort level or baseline comfort level  Description  Interventions:  - Encourage patient to monitor pain and request assistance  - Assess pain using appropriate pain scale  - Administer analgesics based on type and severity of pain and evaluate response  - Implement non-pharmacological measures as appropriate and evaluate response  - Consider cultural and social influences on pain and pain management  - Notify physician/advanced practitioner if interventions unsuccessful or patient reports new pain  9/14/2019 0014 by Deric Dunlap LPN  Outcome: Progressing  9/14/2019 0013 by Deric Dunlap LPN  Outcome: Progressing     Problem: SAFETY ADULT  Goal: Patient will remain free of falls  Description  INTERVENTIONS:  - Assess patient frequently for physical needs  -  Identify cognitive and physical deficits and behaviors that affect risk of falls    -  [place] fall precautions as indicated by assessment   - Educate patient/family on patient safety including physical limitations  - Instruct patient to call for assistance with activity based on assessment  - Modify environment to reduce risk of injury  - Consider OT/PT consult to assist with strengthening/mobility  9/14/2019 0014 by Deric Dunlap LPN  Outcome: Progressing  9/14/2019 0013 by Deric Dunlap LPN  Outcome: Progressing     Problem: RESPIRATORY - ADULT  Goal: Achieves optimal ventilation and oxygenation  Description  INTERVENTIONS:  - Assess for changes in respiratory status  - Assess for changes in mentation and behavior  - Position to facilitate oxygenation and minimize respiratory effort  - Oxygen administration by appropriate delivery method based on oxygen saturation (per order) or ABGs  - Initiate smoking cessation education as indicated  - Encourage broncho-pulmonary hygiene including cough, deep breathe, Incentive Spirometry  - Assess the need for suctioning and aspirate as needed  - Assess and instruct to report SOB or any respiratory difficulty  - Respiratory Therapy support as indicated  Outcome: Corrie Tolliver maintained on ongoing fall, aspiration and SAFE precaution without incident on this shift   Laying in bed with her eyes closed, breath even and unlabored with o2 @1L/m with the humidification   No sign or symptom of respiratory distress noted  Lona Hill continues to remain free from fall   Denies any pain or discomfort   Thus far she is in stabled condition   Q 15 minutes checks during the night continues  No behavioral noted    Will continue to monitor behavioral, respiratory, and sleep pattern

## 2019-09-14 NOTE — PLAN OF CARE
Problem: Alteration in Thoughts and Perception  Goal: Verbalize thoughts and feelings  Description  Interventions:  - Promote a nonjudgmental and trusting relationship with the patient through active listening and therapeutic communication  - Assess patient's level of functioning, behavior and potential for risk  - Engage patient in 1 on 1 interactions for a minimum of 15 minutes each session  - Encourage patient to express fears, feelings, frustrations, and discuss symptoms    - [place] patient to reality, help patient recognize reality-based thinking   - Administer medications as ordered and assess for potential side effects  - Provide the patient education related to the signs and symptoms of the illness and desired effects of prescribed medications  Outcome: Not Progressing  Goal: Agree to be compliant with medication regime, as prescribed and report medication side effects  Description  Interventions:  - Offer appropriate PRN medication and supervise ingestion; conduct aims, as needed   Outcome: Progressing  Goal: Attend and participate in unit activities, including therapeutic, recreational, and educational groups  Description  Interventions:  - Provide therapeutic and educational activities daily, encourage attendance and participation, and document same in the medical record     CERTIFIED PEER SPECIALIST INTERVENTIONS:    Complete peer assessment with patient to assess their needs and identify their goals to complete while in the recovery program as well as once discharged into the community  Patient will complete WRAP Plan, Crisis Plan and 5 Life Domains  Patient will attend 50% of groups offered on the unit  Patient will complete a goal card weekly      Outcome: Not Progressing  Goal: Complete daily ADLs, including personal hygiene independently, as able  Description  Interventions:  - Observe, teach, and assist patient with ADLS  - Monitor and promote a balance of rest/activity, with adequate nutrition and elimination   Outcome: Not Progressing    9/14/19 Shift 7-3  BM-9/14  Shower 9/12   Groups: None   Appetite Intact   Withdrawn and isolative to room and self  Somatically preoccupied with her COPD and continues to report that she is too tired to attend groups and remained isolative to her room in between meals  Continues to lack insight regarding her psych diagnosis and her recovery

## 2019-09-14 NOTE — PROGRESS NOTES
Psychiatry Progress Note    Subjective: Interval History     Patient disheveled in appearance, however did shower for the 1st time 2 days ago after 2 week period of refusing to complete her ADLs  Continues to be provided Education and support in terms of such  Patient still continues to be somatically preoccupied during assessment  Patient reporting that she needs to have her vital signs done because she feels like her heart is going too fast   Reports that this is why she needs to continue to lay in bed  Patient asking about lab results  Patient continues to lack insight into her psychiatric admission and her symptoms as she continues to deny all  Upon review of chart patient has been making the same somatic statements to staff in regards to her heart rate and questioned her lab results  Vital signs remained stable  Continues to be provided support and Education to been more time out of bed and to participate in the milieu  Has been compliant with medications and meals      Behavior over the last 24 hours:  unchanged  Sleep: normal  Appetite: normal  Medication side effects: No  ROS: no complaints    Current medications:    Current Facility-Administered Medications:     acetaminophen (TYLENOL) tablet 650 mg, 650 mg, Oral, Q6H PRN, Deep Durand MD    albuterol (PROVENTIL HFA,VENTOLIN HFA) inhaler 2 puff, 2 puff, Inhalation, Q4H PRN, Deep Durand MD, 2 puff at 07/25/19 1200    aluminum-magnesium hydroxide-simethicone (MYLANTA) 200-200-20 mg/5 mL oral suspension 15 mL, 15 mL, Oral, Q4H PRN, Deep Durand MD    ammonium lactate (LAC-HYDRIN) 12 % lotion 1 application, 1 application, Topical, BID PRN, Deep Durand MD    benzonatate (TESSALON PERLES) capsule 100 mg, 100 mg, Oral, TID PRN, Deep Durand MD    benztropine (COGENTIN) injection 1 mg, 1 mg, Intramuscular, Q8H PRN, Deep Durand MD    cloZAPine (CLOZARIL) tablet 50 mg, 50 mg, Oral, TID, Deep Durand MD, 50 mg at 09/14/19 0817    EPINEPHrine PF (ADRENALIN) 1 mg/mL injection 0 15 mg, 0 15 mg, Intramuscular, Once PRN, Dalton Garner MD    FLUoxetine (PROzac) capsule 10 mg, 10 mg, Oral, Daily, Dalton Garner MD, 10 mg at 09/14/19 0817    fluticasone-vilanterol (BREO ELLIPTA) 200-25 MCG/INH inhaler 1 puff, 1 puff, Inhalation, Daily, Dalton Garner MD, 1 puff at 09/14/19 0819    ketotifen (ZADITOR) 0 025 % ophthalmic solution 1 drop, 1 drop, Right Eye, BID PRN, Dalton Garner MD    levothyroxine tablet 125 mcg, 125 mcg, Oral, Early Morning, Dalton Garner MD, 125 mcg at 09/14/19 0558    magnesium hydroxide (MILK OF MAGNESIA) 400 mg/5 mL oral suspension 30 mL, 30 mL, Oral, Daily PRN, Dalton Garner MD    montelukast (SINGULAIR) tablet 10 mg, 10 mg, Oral, HS, Dalton Garner MD, 10 mg at 09/13/19 2146    OLANZapine (ZyPREXA) IM injection 5 mg, 5 mg, Intramuscular, Q8H PRN, Dalton Garner MD    OLANZapine (ZyPREXA) tablet 5 mg, 5 mg, Oral, Q8H PRN, Dalton Garner MD    pantoprazole (PROTONIX) EC tablet 40 mg, 40 mg, Oral, Early Morning, Dalton Garner MD, 40 mg at 09/14/19 0558    polyethylene glycol (MIRALAX) packet 17 g, 17 g, Oral, Daily PRN, Dalton Garner MD    polyvinyl alcohol (LIQUIFILM TEARS) 1 4 % ophthalmic solution 1 drop, 1 drop, Both Eyes, Q3H PRN, Dalton Garner MD    theophylline (JEF-24) 24 hr capsule 200 mg, 200 mg, Oral, Daily, Dalton Garner MD, 200 mg at 09/14/19 0817    tiotropium (SPIRIVA) capsule for inhaler 18 mcg, 18 mcg, Inhalation, Daily, Dalton Garner MD, 18 mcg at 09/14/19 0819    traZODone (DESYREL) tablet 25 mg, 25 mg, Oral, HS PRN, Dalton Garner MD    Current Problem List:    Patient Active Problem List   Diagnosis    [facility] [address]    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 [address] [facility]    Tobacco use disorder, continuous    Bipolar disorder ([facility] [address] [facility]    Left hip pain    Lactic acidosis    Hypokalemia    Hypomagnesemia    Compression fracture of L4 lumbar vertebra    Thoracic compression fracture (HCC)    Ventral hernia    Parapneumonic effusion    Acute on chronic respiratory failure with hypoxia (HCC)    Chronic respiratory failure (HCC)    Hypophosphatemia    Elevated MCV    Schizoaffective disorder, bipolar type (HCC)    Acquired hypothyroidism    Gastroesophageal reflux disease without esophagitis    Abnormal CT of the chest    Excessive cerumen in left ear canal    Lipoma of right upper extremity    Polydipsia    Localized swelling of both lower legs    Noncompliant with deep vein thrombosis (DVT) prophylaxis    Allergic conjunctivitis of right eye       Problem list reviewed 09/14/19     Objective:     Vital Signs:  Vitals:    09/13/19 2000 09/13/19 2140 09/14/19 0700 09/14/19 0705   BP: 99/64  138/70 103/56   BP Location: Left arm  Left arm Left arm   Pulse: 86  99 (!) 110   Resp: 16  20 20   Temp: 97 6 °F (36 4 °C)  97 6 °F (36 4 °C)    TempSrc: Temporal  Temporal    SpO2: 93% 92%  93%   Weight:       Height:             Appearance:  age appropriate, casually dressed and disheveled   Behavior:  normal   Speech:  normal volume   Mood:  constricted   Affect:  constricted   Thought Process:  normal   Thought Content:  normal   Perceptual Disturbances: None   Risk Potential: none   Sensorium:  person, place and time   Cognition:  intact   Consciousness:  alert and awake    Attention: attention span and concentration were age appropriate   Intellect: average   Insight:  limited   Judgment: limited      Motor Activity: no abnormal movements       I/O Past 24 hours:  No intake/output data recorded  No intake/output data recorded  Labs:  Reviewed 09/14/19    Progress Toward Goals:  unchanged    Assessment / Plan:     Schizoaffective disorder, bipolar type ([facility] [address] )    Recommended Treatment:      Medication changes:  1) continue current treatment plan    Non-pharmacological treatments  1) Continue with group therapy, milieu therapy and occupational therapy  Safety  1) Safety/communication plan established targeting dynamic risk factors above      2) Risks, benefits, and possible side effects of medications explained to patient and patient verbalizes understanding  Counseling / Coordination of Care    Total floor / unit time spent today 20 minutes  Greater than 50% of total time was spent with the patient and / or family counseling and / or coordination of care  A description of the counseling / coordination of care  Patient's Rights, confidentiality and exceptions to confidentiality, use of automated medical record, [facility] staff access to medical record, and consent to treatment reviewed      Mercy Che PA-C

## 2019-09-14 NOTE — PLAN OF CARE
Problem: Alteration in Thoughts and Perception  Goal: Agree to be compliant with medication regime, as prescribed and report medication side effects  Description  Interventions:  - Offer appropriate PRN medication and supervise ingestion; conduct aims, as needed   Outcome: Progressing  Goal: Complete daily ADLs, including personal hygiene independently, as able  Description  Interventions:  - Observe, teach, and assist patient with ADLS  - Monitor and promote a balance of rest/activity, with adequate nutrition and elimination   Outcome: Progressing     Problem: Alteration in Thoughts and Perception  Goal: Attend and participate in unit activities, including therapeutic, recreational, and educational groups  Description  Interventions:  - Provide therapeutic and educational activities daily, encourage attendance and participation, and document same in the medical record     CERTIFIED PEER SPECIALIST INTERVENTIONS:    Complete peer assessment with patient to assess their needs and identify their goals to complete while in the recovery program as well as once discharged into the community  Patient will complete WRAP Plan, Crisis Plan and 5 Life Domains  Patient will attend 50% of groups offered on the unit  Patient will complete a goal card weekly  Outcome: Not Progressing     Problem: Depression  Goal: Refrain from isolation  Description  Interventions:  - Develop a trusting relationship   - Encourage socialization   Outcome: Not Progressing     2020 Chanda Rod continues isolative to her room, coming out for meal (ate 50%), to get supper medicine & later to make a phone call  She was amenable to having her matted hair combed out & braided by this nurse  Unable to explain why hadn't combed it out before shower yesterday  Did not attend either [employer] or [employer] outside on deck  She is pleasant, but, not interactive unless she is approached in room

## 2019-09-15 RX ADMIN — MONTELUKAST SODIUM 10 MG: 10 TABLET, COATED ORAL at 21:14

## 2019-09-15 RX ADMIN — LEVOTHYROXINE SODIUM 125 MCG: 125 TABLET ORAL at 06:04

## 2019-09-15 RX ADMIN — FLUTICASONE FUROATE AND VILANTEROL TRIFENATATE 1 PUFF: 200; 25 POWDER RESPIRATORY (INHALATION) at 08:37

## 2019-09-15 RX ADMIN — PANTOPRAZOLE SODIUM 40 MG: 40 TABLET, DELAYED RELEASE ORAL at 06:03

## 2019-09-15 RX ADMIN — CLOZAPINE 50 MG: 25 TABLET ORAL at 20:49

## 2019-09-15 RX ADMIN — CLOZAPINE 50 MG: 25 TABLET ORAL at 17:46

## 2019-09-15 RX ADMIN — TIOTROPIUM BROMIDE 18 MCG: 18 CAPSULE ORAL; RESPIRATORY (INHALATION) at 08:38

## 2019-09-15 RX ADMIN — CLOZAPINE 50 MG: 25 TABLET ORAL at 08:37

## 2019-09-15 RX ADMIN — THEOPHYLLINE ANHYDROUS 200 MG: 200 CAPSULE, EXTENDED RELEASE ORAL at 08:37

## 2019-09-15 RX ADMIN — FLUOXETINE 10 MG: 10 CAPSULE ORAL at 08:37

## 2019-09-15 NOTE — PROGRESS NOTES
Psychiatry Progress Note    Subjective: Interval History     Patient visible on the unit for breakfast however then immediately retreating back to her room  Hygiene remains poor  Reports that she is feeling tired  Reports that she is too weak to attend groups or do anything  Appears to be poorly motivated in any attempts of recovery, blaming her COPD  Lacking insight in to her psychiatric symptoms and need for psychiatric treatment  Has been compliant with medications  Did sleep last evening      Behavior over the last 24 hours:  unchanged  Sleep: normal  Appetite: normal  Medication side effects: No  ROS: no complaints    Current medications:    Current Facility-Administered Medications:     acetaminophen (TYLENOL) tablet 650 mg, 650 mg, Oral, Q6H PRN, Amina Aparicio MD    albuterol (PROVENTIL HFA,VENTOLIN HFA) inhaler 2 puff, 2 puff, Inhalation, Q4H PRN, Amina Aparicio MD, 2 puff at 07/25/19 1200    aluminum-magnesium hydroxide-simethicone (MYLANTA) 200-200-20 mg/5 mL oral suspension 15 mL, 15 mL, Oral, Q4H PRN, Amina Aparicio MD    ammonium lactate (LAC-HYDRIN) 12 % lotion 1 application, 1 application, Topical, BID PRN, Amina Aparicio MD    benzonatate (TESSALON PERLES) capsule 100 mg, 100 mg, Oral, TID PRN, Amina Aparicio MD    benztropine (COGENTIN) injection 1 mg, 1 mg, Intramuscular, Q8H PRN, Amina Aparicio MD    cloZAPine (CLOZARIL) tablet 50 mg, 50 mg, Oral, TID, Amina Aparicio MD, 50 mg at 09/15/19 0837    EPINEPHrine PF (ADRENALIN) 1 mg/mL injection 0 15 mg, 0 15 mg, Intramuscular, Once PRN, Amina Aparicio MD    FLUoxetine (PROzac) capsule 10 mg, 10 mg, Oral, Daily, Amina Aparicio MD, 10 mg at 09/15/19 0837    fluticasone-vilanterol (BREO ELLIPTA) 200-25 MCG/INH inhaler 1 puff, 1 puff, Inhalation, Daily, Amina Aparicio MD, 1 puff at 09/15/19 0837    ketotifen (ZADITOR) 0 025 % ophthalmic solution 1 drop, 1 drop, Right Eye, BID PRN, Amina Aparicio MD    levothyroxine tablet 125 mcg, 125 mcg, Oral, Early Morning, Prakash Brewster MD, 125 mcg at 09/15/19 0604    magnesium hydroxide (MILK OF MAGNESIA) 400 mg/5 mL oral suspension 30 mL, 30 mL, Oral, Daily PRN, Prakash Brewster MD    montelukast (SINGULAIR) tablet 10 mg, 10 mg, Oral, HS, Prakash Brewster MD, 10 mg at 09/14/19 2130    OLANZapine (ZyPREXA) IM injection 5 mg, 5 mg, Intramuscular, Q8H PRN, Prakash Brewster MD    OLANZapine (ZyPREXA) tablet 5 mg, 5 mg, Oral, Q8H PRN, Prakash Brewster MD    pantoprazole (PROTONIX) EC tablet 40 mg, 40 mg, Oral, Early Morning, Prakash Brewster MD, 40 mg at 09/15/19 0603    polyethylene glycol (MIRALAX) packet 17 g, 17 g, Oral, Daily PRN, Prakash Brewster MD    polyvinyl alcohol (LIQUIFILM TEARS) 1 4 % ophthalmic solution 1 drop, 1 drop, Both Eyes, Q3H PRN, Prakash Brewster MD    theophylline (JEF-24) 24 hr capsule 200 mg, 200 mg, Oral, Daily, Prakash Brewster MD, 200 mg at 09/15/19 0837    tiotropium (SPIRIVA) capsule for inhaler 18 mcg, 18 mcg, Inhalation, Daily, Prakash Brewster MD, 18 mcg at 09/15/19 7214    traZODone (DESYREL) tablet 25 mg, 25 mg, Oral, HS PRN, Prakash Brewster MD    Current Problem List:    Patient Active Problem List   Diagnosis    [facility]eps[facility] [address] [facility]    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[address]    Tobacco use disorder, continuous    Bipolar disorder ([address]    Left hip pain    Lactic acidosis    Hypokalemia    Hypomagnesemia    Compression fracture of L4 lumbar vertebra    Thoracic compression fracture (HCC)    Ventral hernia    Parapneumonic effusion    Acute on chronic respiratory failure with hypoxia (HCC)    Chronic respiratory failure (HCC)    Hypophosphatemia    Elevated MCV    Schizoaffective disorder, bipolar type ([address]    Acquired hypothyroidism    Gastroesophageal reflux disease without esophagitis    Abnormal CT of the chest    Excessive cerumen in left ear canal    Lipoma of right upper extremity    Polydipsia    Localized swelling of both lower legs    Noncompliant with deep vein thrombosis (DVT) prophylaxis    Allergic conjunctivitis of right eye       Problem list reviewed 09/15/19     Objective:     Vital Signs:  Vitals:    09/14/19 0705 09/14/19 1500 09/14/19 1900 09/14/19 2130   BP: 103/56 97/60 105/57    BP Location: Left arm Left arm Left arm    Pulse: (!) 110 94 97    Resp: 20 17 17    Temp:  (!) 97 °F (36 1 °C) (!) 97 1 °F (36 2 °C)    TempSrc:  Temporal Temporal    SpO2: 93% 95% 92% 94%   Weight:       Height:             Appearance:  age appropriate, casually dressed and disheveled   Behavior:  normal   Speech:  normal volume   Mood:  constricted   Affect:  constricted   Thought Process:  normal   Thought Content:  normal   Perceptual Disturbances: None   Risk Potential: none   Sensorium:  person, place and time   Cognition:  intact   Consciousness:  alert and awake    Attention: attention span and concentration were age appropriate   Intellect: average   Insight:  limited   Judgment: limited      Motor Activity: no abnormal movements       I/O Past 24 hours:  No intake/output data recorded  No intake/output data recorded  Labs:  Reviewed 09/15/19    Progress Toward Goals:  unchanged    Assessment / Plan:     Schizoaffective disorder, bipolar type ([facility] [address] )    Recommended Treatment:      Medication changes:  1) continue current treatment plan    Non-pharmacological treatments  1) Continue with group therapy, milieu therapy and occupational therapy  Safety  1) Safety/communication plan established targeting dynamic risk factors above  2) Risks, benefits, and possible side effects of medications explained to patient and patient verbalizes understanding  Counseling / Coordination of Care    Total floor / unit time spent today 20 minutes  Greater than 50% of total time was spent with the patient and / or family counseling and / or coordination of care  A description of the counseling / coordination of care       Patient's Rights, confidentiality and exceptions to confidentiality, use of automated medical record, [facility] staff access to medical record, and consent to treatment reviewed      Eladia Sanchez PA-C

## 2019-09-15 NOTE — PLAN OF CARE
Problem: Alteration in Thoughts and Perception  Goal: Agree to be compliant with medication regime, as prescribed and report medication side effects  Description  Interventions:  - Offer appropriate PRN medication and supervise ingestion; conduct aims, as needed   Outcome: Progressing     Problem: PAIN - ADULT  Goal: Verbalizes/displays adequate comfort level or baseline comfort level  Description  Interventions:  - Encourage patient to monitor pain and request assistance  - Assess pain using appropriate pain scale  - Administer analgesics based on type and severity of pain and evaluate response  - Implement non-pharmacological measures as appropriate and evaluate response  - Consider cultural and social influences on pain and pain management  - Notify physician/advanced practitioner if interventions unsuccessful or patient reports new pain  Outcome: Progressing     Problem: SAFETY ADULT  Goal: Patient will remain free of falls  Description  INTERVENTIONS:  - Assess patient frequently for physical needs  -  Identify cognitive and physical deficits and behaviors that affect risk of falls    -  [place] fall precautions as indicated by assessment   - Educate patient/family on patient safety including physical limitations  - Instruct patient to call for assistance with activity based on assessment  - Modify environment to reduce risk of injury  - Consider OT/PT consult to assist with strengthening/mobility  Outcome: Progressing     Problem: RESPIRATORY - ADULT  Goal: Achieves optimal ventilation and oxygenation  Description  INTERVENTIONS:  - Assess for changes in respiratory status  - Assess for changes in mentation and behavior  - Position to facilitate oxygenation and minimize respiratory effort  - Oxygen administration by appropriate delivery method based on oxygen saturation (per order) or ABGs  - Initiate smoking cessation education as indicated  - Encourage broncho-pulmonary hygiene including cough, deep breathe, Incentive Spirometry  - Assess the need for suctioning and aspirate as needed  - Assess and instruct to report SOB or any respiratory difficulty  - Respiratory Therapy support as indicated  Outcome: Progressing   The patient slept through the night with no behaviors or issues noted  Oxygen maintained at 1 liter via NC  No s/s respiratory distress noted  Fall precautions maintained  Med compliant  Continue to monitor

## 2019-09-15 NOTE — PLAN OF CARE
Problem: Alteration in Thoughts and Perception  Goal: Verbalize thoughts and feelings  Description  Interventions:  - Promote a nonjudgmental and trusting relationship with the patient through active listening and therapeutic communication  - Assess patient's level of functioning, behavior and potential for risk  - Engage patient in 1 on 1 interactions for a minimum of 15 minutes each session  - Encourage patient to express fears, feelings, frustrations, and discuss symptoms    - [place] patient to reality, help patient recognize reality-based thinking   - Administer medications as ordered and assess for potential side effects  - Provide the patient education related to the signs and symptoms of the illness and desired effects of prescribed medications  Outcome: Progressing  Goal: Agree to be compliant with medication regime, as prescribed and report medication side effects  Description  Interventions:  - Offer appropriate PRN medication and supervise ingestion; conduct aims, as needed   Outcome: Progressing     Problem: RESPIRATORY - ADULT  Goal: Achieves optimal ventilation and oxygenation  Description  INTERVENTIONS:  - Assess for changes in respiratory status  - Assess for changes in mentation and behavior  - Position to facilitate oxygenation and minimize respiratory effort  - Oxygen administration by appropriate delivery method based on oxygen saturation (per order) or ABGs  - Initiate smoking cessation education as indicated  - Encourage broncho-pulmonary hygiene including cough, deep breathe, Incentive Spirometry  - Assess the need for suctioning and aspirate as needed  - Assess and instruct to report SOB or any respiratory difficulty  - Respiratory Therapy support as indicated  Outcome: Progressing     Problem: Alteration in Thoughts and Perception  Goal: Attend and participate in unit activities, including therapeutic, recreational, and educational groups  Description  Interventions:  - Provide therapeutic and educational activities daily, encourage attendance and participation, and document same in the medical record     CERTIFIED PEER SPECIALIST INTERVENTIONS:    Complete peer assessment with patient to assess their needs and identify their goals to complete while in the recovery program as well as once discharged into the community  Patient will complete WRAP Plan, Crisis Plan and 5 Life Domains  Patient will attend 50% of groups offered on the unit  Patient will complete a goal card weekly  Outcome: Not Progressing  Goal: Complete daily ADLs, including personal hygiene independently, as able  Description  Interventions:  - Observe, teach, and assist patient with ADLS  - Monitor and promote a balance of rest/activity, with adequate nutrition and elimination   Outcome: Not Progressing     Problem: Ineffective Coping  Goal: Demonstrates healthy coping skills  Outcome: Not Progressing     [address] Steiner continues to isolate in room & bed most of time this shift  She is pleasant on approach, but, makes no effort to attend to hygiene, presents as helpless w/simple tasks (I e  Pulling fitted sheep over corner of bed; "I'm not strong enough")  Did come out for meal, but, ate only 25% because, "It's too sloppy to eat " Has come to window for scheduled medicine  Did not attend PM Group  Has used her Incentive Spirometer w/consistent improvement; getting 1000-1100ml volume most breaths  Had an HS snack  Expresses worry about her older brother who has had recent open heart surgery & did call him to check on his welfare  Is wearing now for bed her QHS nasal O2 @ 1L via humidified cannula/compressor

## 2019-09-15 NOTE — PLAN OF CARE
Problem: Alteration in Thoughts and Perception  Goal: Treatment Goal: Gain control of psychotic behaviors/thinking, reduce/eliminate presenting symptoms and demonstrate improved reality functioning upon discharge  Outcome: Progressing  Goal: Verbalize thoughts and feelings  Description  Interventions:  - Promote a nonjudgmental and trusting relationship with the patient through active listening and therapeutic communication  - Assess patient's level of functioning, behavior and potential for risk  - Engage patient in 1 on 1 interactions for a minimum of 15 minutes each session  - Encourage patient to express fears, feelings, frustrations, and discuss symptoms    - [place] patient to reality, help patient recognize reality-based thinking   - Administer medications as ordered and assess for potential side effects  - Provide the patient education related to the signs and symptoms of the illness and desired effects of prescribed medications  Outcome: Progressing  Goal: Agree to be compliant with medication regime, as prescribed and report medication side effects  Description  Interventions:  - Offer appropriate PRN medication and supervise ingestion; conduct aims, as needed   Outcome: Progressing  Goal: Attend and participate in unit activities, including therapeutic, recreational, and educational groups  Description  Interventions:  - Provide therapeutic and educational activities daily, encourage attendance and participation, and document same in the medical record     CERTIFIED PEER SPECIALIST INTERVENTIONS:    Complete peer assessment with patient to assess their needs and identify their goals to complete while in the recovery program as well as once discharged into the community  Patient will complete WRAP Plan, Crisis Plan and 5 Life Domains  Patient will attend 50% of groups offered on the unit  Patient will complete a goal card weekly      Outcome: Progressing  Goal: Recognize dysfunctional thoughts, communicate reality-based thoughts at the time of discharge  Description  Interventions:  - Provide medication and psycho-education to assist patient in compliance and developing insight into his/her illness   Outcome: Progressing  Goal: Complete daily ADLs, including personal hygiene independently, as able  Description  Interventions:  - Observe, teach, and assist patient with ADLS  - Monitor and promote a balance of rest/activity, with adequate nutrition and elimination   Outcome: Progressing     Problem: Ineffective Coping  Goal: Identifies ineffective coping skills  Outcome: Progressing  Goal: Identifies healthy coping skills  Outcome: Progressing  Goal: Demonstrates healthy coping skills  Outcome: Progressing  Goal: Participates in unit activities  Description  Interventions:  - Provide therapeutic environment   - Provide required programming   - Redirect inappropriate behaviors   Outcome: Progressing  Goal: Patient/Family participate in treatment and DC plans  Description  Interventions:  - Provide therapeutic environment  Outcome: Progressing  Goal: Patient/Family verbalizes awareness of resources  Outcome: Progressing  Goal: Understands least restrictive measures  Description  Interventions:  - Utilize least restrictive behavior  Outcome: Progressing     Problem: Risk for Self Injury/Neglect  Goal: Treatment Goal: Remain safe during length of stay, learn and adopt new coping skills, and be free of self-injurious ideation, impulses and acts at the time of discharge  Outcome: Progressing  Goal: Verbalize thoughts and feelings  Description  Interventions:  - Assess and re-assess patient's lethality and potential for self-injury  - Engage patient in 1:1 interactions, daily, for a minimum of 15 minutes  - Encourage patient to express feelings, fears, frustrations, hopes  - Establish rapport/trust with patient   Outcome: Progressing  Goal: Refrain from harming self  Description  Interventions:  - Monitor patient closely, per order  - Develop a trusting relationship  - Supervise medication ingestion, monitor effects and side effects   Outcome: Progressing  Goal: Attend and participate in unit activities, including therapeutic, recreational, and educational groups  Description  Interventions:  - Provide therapeutic and educational activities daily, encourage attendance and participation, and document same in the medical record  - Obtain collateral information, encourage visitation and family involvement in care   Outcome: Progressing  Goal: Recognize maladaptive responses and adopt new coping mechanisms  Outcome: Progressing  Goal: Complete daily ADLs, including personal hygiene independently, as able  Description  Interventions:  - Observe, teach, and assist patient with ADLS  - Monitor and promote a balance of rest/activity, with adequate nutrition and elimination  Outcome: Progressing     Problem: Depression  Goal: Treatment Goal: Demonstrate behavioral control of depressive symptoms, verbalize feelings of improved mood/affect, and adopt new coping skills prior to discharge  Outcome: Progressing  Goal: Verbalize thoughts and feelings  Description  Interventions:  - Assess and re-assess patient's level of risk   - Engage patient in 1:1 interactions, daily, for a minimum of 15 minutes   - Encourage patient to express feelings, fears, frustrations, hopes   Outcome: Progressing  Goal: Refrain from isolation  Description  Interventions:  - Develop a trusting relationship   - Encourage socialization   Outcome: Progressing  Goal: Refrain from self-neglect  Outcome: Progressing     Problem: Anxiety  Goal: Anxiety is at manageable level  Description  Interventions:  - Assess and monitor patient's anxiety level  - Monitor for signs and symptoms of anxiety both physical and emotional (heart palpitations, chest pain, shortness of breath, headaches, nausea, feeling jumpy, restlessness, irritable, apprehensive)     - Collaborate with interdisciplinary team and initiate plan and interventions as ordered  - [place] patient to unit/surroundings  - Explain treatment plan  - Encourage participation in care  - Encourage verbalization of concerns/fears  - Identify coping mechanisms  - Assist in developing anxiety-reducing skills  - Administer/offer alternative therapies  - Limit or eliminate stimulants  Outcome: Progressing     Problem: Alteration in Orientation  Goal: Interact with staff daily  Description  Interventions:  - Assess and re-assess patient's level of orientation  - Engage patient in 1 on 1 interactions, daily, for a minimum of 15 minutes   - Establish rapport/trust with patient   Outcome: Progressing  Goal: Cooperate with recommended testing/procedures  Description  Interventions:  - Determine need for ancillary testing  - Observe for mental status changes  - Implement falls/precaution protocol   Outcome: Progressing     Problem: PAIN - ADULT  Goal: Verbalizes/displays adequate comfort level or baseline comfort level  Description  Interventions:  - Encourage patient to monitor pain and request assistance  - Assess pain using appropriate pain scale  - Administer analgesics based on type and severity of pain and evaluate response  - Implement non-pharmacological measures as appropriate and evaluate response  - Consider cultural and social influences on pain and pain management  - Notify physician/advanced practitioner if interventions unsuccessful or patient reports new pain  Outcome: Progressing     Problem: SAFETY ADULT  Goal: Patient will remain free of falls  Description  INTERVENTIONS:  - Assess patient frequently for physical needs  -  Identify cognitive and physical deficits and behaviors that affect risk of falls    -  [place] fall precautions as indicated by assessment   - Educate patient/family on patient safety including physical limitations  - Instruct patient to call for assistance with activity based on assessment  - Modify environment to reduce risk of injury  - Consider OT/PT consult to assist with strengthening/mobility  Outcome: Progressing     Problem: RESPIRATORY - ADULT  Goal: Achieves optimal ventilation and oxygenation  Description  INTERVENTIONS:  - Assess for changes in respiratory status  - Assess for changes in mentation and behavior  - Position to facilitate oxygenation and minimize respiratory effort  - Oxygen administration by appropriate delivery method based on oxygen saturation (per order) or ABGs  - Initiate smoking cessation education as indicated  - Encourage broncho-pulmonary hygiene including cough, deep breathe, Incentive Spirometry  - Assess the need for suctioning and aspirate as needed  - Assess and instruct to report SOB or any respiratory difficulty  - Respiratory Therapy support as indicated  Outcome: Progressing     Problem: DISCHARGE PLANNING  Goal: Discharge to home or other facility with appropriate resources  Description  INTERVENTIONS:  - Conduct assessment to determine patient/family and health care team treatment goals, and need for post-acute services based on payer coverage, community resources, and patient preferences, and barriers to discharge  - Address psychosocial, clinical, and financial barriers to discharge as identified in assessment in conjunction with the patient/family and health care team  - Assist the patient in reintegration back into the community by removing barriers which may hinder a successful discharge once deemed stable  - Arrange appropriate level of post-acute services according to patient's needs and preference and payer coverage in collaboration with the physician and health care team  - Communicate with and update the patient/family, physician, and health care team regarding progress on the discharge plan  - Arrange appropriate transportation to post-acute venues    Outcome: Progressing    9/15/19 Shift 7-3  BM-9/15  Shower 9/12   Groups: None   Meals:  100% breakfast, 25% lunch (did not like the way meat was cooked and did not want new tray)  Patient was instructed, and complied with stripping of bed, wiping it down and gathering laundry for washing  Patient did not carry laundry to washer but had roommate do it for her  Patient did not attend to hygiene  She is compliant with incentive spirometer  Patient remains withdrawn and isolative to room and self  Somatically preoccupied with her COPD and continues to report that she is too tired to attend groups and told staff that this will be the only time that she cleans her room and clothing because she came from group home where they did all of this for her for 9 years

## 2019-09-16 PROCEDURE — 99231 SBSQ HOSP IP/OBS SF/LOW 25: CPT | Performed by: PSYCHIATRY & NEUROLOGY

## 2019-09-16 RX ADMIN — FLUTICASONE FUROATE AND VILANTEROL TRIFENATATE 1 PUFF: 200; 25 POWDER RESPIRATORY (INHALATION) at 08:46

## 2019-09-16 RX ADMIN — THEOPHYLLINE ANHYDROUS 200 MG: 200 CAPSULE, EXTENDED RELEASE ORAL at 08:46

## 2019-09-16 RX ADMIN — LEVOTHYROXINE SODIUM 125 MCG: 125 TABLET ORAL at 06:07

## 2019-09-16 RX ADMIN — FLUOXETINE 10 MG: 10 CAPSULE ORAL at 08:46

## 2019-09-16 RX ADMIN — CLOZAPINE 50 MG: 25 TABLET ORAL at 08:46

## 2019-09-16 RX ADMIN — CLOZAPINE 50 MG: 25 TABLET ORAL at 17:54

## 2019-09-16 RX ADMIN — CLOZAPINE 50 MG: 25 TABLET ORAL at 21:31

## 2019-09-16 RX ADMIN — MONTELUKAST SODIUM 10 MG: 10 TABLET, COATED ORAL at 21:31

## 2019-09-16 RX ADMIN — PANTOPRAZOLE SODIUM 40 MG: 40 TABLET, DELAYED RELEASE ORAL at 06:07

## 2019-09-16 RX ADMIN — TIOTROPIUM BROMIDE 18 MCG: 18 CAPSULE ORAL; RESPIRATORY (INHALATION) at 08:46

## 2019-09-16 NOTE — PROGRESS NOTES
[address] Higginbotham did come up on own for HS medicine, had an HS snack  She used the [employer] reaching consistent volumes of 1000ml  She is now wearing her QHS humidified nasal O2 @ 1L for bed

## 2019-09-16 NOTE — PLAN OF CARE
Problem: Alteration in Thoughts and Perception  Goal: Attend and participate in unit activities, including therapeutic, recreational, and educational groups  Description  Interventions:  - Provide therapeutic and educational activities daily, encourage attendance and participation, and document same in the medical record     CERTIFIED PEER SPECIALIST INTERVENTIONS:    Complete peer assessment with patient to assess their needs and identify their goals to complete while in the recovery program as well as once discharged into the community  Patient will complete WRAP Plan, Crisis Plan and 5 Life Domains  Patient will attend 50% of groups offered on the unit  Patient will complete a goal card weekly  Outcome: Progressing     Problem: Ineffective Coping  Goal: Participates in unit activities  Description  Interventions:  - Provide therapeutic environment   - Provide required programming   - Redirect inappropriate behaviors   Outcome: Progressing     Problem: Risk for Self Injury/Neglect  Goal: Attend and participate in unit activities, including therapeutic, recreational, and educational groups  Description  Interventions:  - Provide therapeutic and educational activities daily, encourage attendance and participation, and document same in the medical record  - Obtain collateral information, encourage visitation and family involvement in care   Outcome: Progressing     Problem: Anxiety  Goal: Anxiety is at manageable level  Description  Interventions:  - Assess and monitor patient's anxiety level  - Monitor for signs and symptoms of anxiety both physical and emotional (heart palpitations, chest pain, shortness of breath, headaches, nausea, feeling jumpy, restlessness, irritable, apprehensive)  - Collaborate with interdisciplinary team and initiate plan and interventions as ordered    - [place] patient to unit/surroundings  - Explain treatment plan  - Encourage participation in care  - Encourage verbalization of concerns/fears  - Identify coping mechanisms  - Assist in developing anxiety-reducing skills  - Administer/offer alternative therapies  - Limit or eliminate stimulants  Outcome: Progressing     Problem: Alteration in Orientation  Goal: Interact with staff daily  Description  Interventions:  - Assess and re-assess patient's level of orientation  - Engage patient in 1 on 1 interactions, daily, for a minimum of 15 minutes   - Establish rapport/trust with patient   Outcome: Progressing  Goal: Cooperate with recommended testing/procedures  Description  Interventions:  - Determine need for ancillary testing  - Observe for mental status changes  - Implement falls/precaution protocol   Outcome: Progressing     Problem: PAIN - ADULT  Goal: Verbalizes/displays adequate comfort level or baseline comfort level  Description  Interventions:  - Encourage patient to monitor pain and request assistance  - Assess pain using appropriate pain scale  - Administer analgesics based on type and severity of pain and evaluate response  - Implement non-pharmacological measures as appropriate and evaluate response  - Consider cultural and social influences on pain and pain management  - Notify physician/advanced practitioner if interventions unsuccessful or patient reports new pain  Outcome: Progressing     Problem: SAFETY ADULT  Goal: Patient will remain free of falls  Description  INTERVENTIONS:  - Assess patient frequently for physical needs  -  Identify cognitive and physical deficits and behaviors that affect risk of falls    -  [place] fall precautions as indicated by assessment   - Educate patient/family on patient safety including physical limitations  - Instruct patient to call for assistance with activity based on assessment  - Modify environment to reduce risk of injury  - Consider OT/PT consult to assist with strengthening/mobility  Outcome: Progressing     Problem: RESPIRATORY - ADULT  Goal: Achieves optimal ventilation and oxygenation  Description  INTERVENTIONS:  - Assess for changes in respiratory status  - Assess for changes in mentation and behavior  - Position to facilitate oxygenation and minimize respiratory effort  - Oxygen administration by appropriate delivery method based on oxygen saturation (per order) or ABGs  - Initiate smoking cessation education as indicated  - Encourage broncho-pulmonary hygiene including cough, deep breathe, Incentive Spirometry  - Assess the need for suctioning and aspirate as needed  - Assess and instruct to report SOB or any respiratory difficulty  - Respiratory Therapy support as indicated  Outcome: Progressing     Problem: DISCHARGE PLANNING  Goal: Discharge to home or other facility with appropriate resources  Description  INTERVENTIONS:  - Conduct assessment to determine patient/family and health care team treatment goals, and need for post-acute services based on payer coverage, community resources, and patient preferences, and barriers to discharge  - Address psychosocial, clinical, and financial barriers to discharge as identified in assessment in conjunction with the patient/family and health care team  - Assist the patient in reintegration back into the community by removing barriers which may hinder a successful discharge once deemed stable  - Arrange appropriate level of post-acute services according to patient's needs and preference and payer coverage in collaboration with the physician and health care team  - Communicate with and update the patient/family, physician, and health care team regarding progress on the discharge plan  - Arrange appropriate transportation to post-acute venues    Outcome: Progressing     Problem: Alteration in Thoughts and Perception  Goal: Treatment Goal: Gain control of psychotic behaviors/thinking, reduce/eliminate presenting symptoms and demonstrate improved reality functioning upon discharge  Outcome: Not Progressing  Goal: Verbalize thoughts and feelings  Description  Interventions:  - Promote a nonjudgmental and trusting relationship with the patient through active listening and therapeutic communication  - Assess patient's level of functioning, behavior and potential for risk  - Engage patient in 1 on 1 interactions for a minimum of 15 minutes each session  - Encourage patient to express fears, feelings, frustrations, and discuss symptoms    - [place] patient to reality, help patient recognize reality-based thinking   - Administer medications as ordered and assess for potential side effects  - Provide the patient education related to the signs and symptoms of the illness and desired effects of prescribed medications  Outcome: Not Progressing  Goal: Agree to be compliant with medication regime, as prescribed and report medication side effects  Description  Interventions:  - Offer appropriate PRN medication and supervise ingestion; conduct aims, as needed   Outcome: Not Progressing  Goal: Recognize dysfunctional thoughts, communicate reality-based thoughts at the time of discharge  Description  Interventions:  - Provide medication and psycho-education to assist patient in compliance and developing insight into his/her illness   Outcome: Not Progressing  Goal: Complete daily ADLs, including personal hygiene independently, as able  Description  Interventions:  - Observe, teach, and assist patient with ADLS  - Monitor and promote a balance of rest/activity, with adequate nutrition and elimination   Outcome: Not Progressing     Problem: Ineffective Coping  Goal: Identifies ineffective coping skills  Outcome: Not Progressing  Goal: Identifies healthy coping skills  Outcome: Not Progressing  Goal: Demonstrates healthy coping skills  Outcome: Not Progressing  Goal: Patient/Family participate in treatment and DC plans  Description  Interventions:  - Provide therapeutic environment  Outcome: Not Progressing  Goal: Patient/Family verbalizes awareness of resources  Outcome: Not Progressing  Goal: Understands least restrictive measures  Description  Interventions:  - Utilize least restrictive behavior  Outcome: Not Progressing     Problem: Risk for Self Injury/Neglect  Goal: Treatment Goal: Remain safe during length of stay, learn and adopt new coping skills, and be free of self-injurious ideation, impulses and acts at the time of discharge  Outcome: Not Progressing  Goal: Verbalize thoughts and feelings  Description  Interventions:  - Assess and re-assess patient's lethality and potential for self-injury  - Engage patient in 1:1 interactions, daily, for a minimum of 15 minutes  - Encourage patient to express feelings, fears, frustrations, hopes  - Establish rapport/trust with patient   Outcome: Not Progressing  Goal: Refrain from harming self  Description  Interventions:  - Monitor patient closely, per order  - Develop a trusting relationship  - Supervise medication ingestion, monitor effects and side effects   Outcome: Not Progressing  Goal: Recognize maladaptive responses and adopt new coping mechanisms  Outcome: Not Progressing  Goal: Complete daily ADLs, including personal hygiene independently, as able  Description  Interventions:  - Observe, teach, and assist patient with ADLS  - Monitor and promote a balance of rest/activity, with adequate nutrition and elimination  Outcome: Not Progressing     Problem: Depression  Goal: Treatment Goal: Demonstrate behavioral control of depressive symptoms, verbalize feelings of improved mood/affect, and adopt new coping skills prior to discharge  Outcome: Not Progressing  Goal: Verbalize thoughts and feelings  Description  Interventions:  - Assess and re-assess patient's level of risk   - Engage patient in 1:1 interactions, daily, for a minimum of 15 minutes   - Encourage patient to express feelings, fears, frustrations, hopes   Outcome: Not Progressing  Goal: Refrain from isolation  Description  Interventions:  - Develop a trusting relationship   - Encourage socialization   Outcome: Not Progressing  Goal: Refrain from self-neglect  Outcome: Not Progressing      9/16/19 Shift 7-3  BM-9/16  Shower 9/12   Groups: IMR   Meals:  20% breakfast, 50% lunch   Patient did not attend to hygiene   She is compliant with incentive spirometer   Patient remains withdrawn and isolative to room and self  Somatically preoccupied with her COPD   Out of bed for meals and IMR only    New Order:  Off unit in hospital with staff

## 2019-09-16 NOTE — PLAN OF CARE
PATIENT COMPLETED GOALS CARD FOR THE WEEK OF 09/16/2019  GOALS:    - Attend groups,   - Shower more and  - Drink more water

## 2019-09-16 NOTE — PROGRESS NOTES
09/16/19 0900   Team Meeting   Meeting Type Daily Rounds   Team Members Present   Team Members Present Physician;Nurse;; Other (Discipline and Name)   Physician Team Member Dr Leatha Lewis Team Member Marlo Fairbanks RN   Care Management Team Member Brenda Vargas   Other (Discipline and Name) DIANA Kelly     No changes  Angry with staff who asked her to clean her room stating she is too weak to do so  Upset with her brother too and asked him to be taken off the contact list  Last shower was on 9/12  Slept
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
## 2019-09-16 NOTE — PROGRESS NOTES
Progress Note - oRmmel Wilkins 1955, 60 y o  female MRN: [id number]    Unit/Bed#: [facility] 070-24 Encounter: [id number]    Primary Care Provider: Jennifer Oconnor MD   Date and time admitted to hospital: 7/23/2019  5:30 PM        * Schizoaffective disorder, bipolar type [facility])  Assessment & Plan  Psychiatry Progress Note    Subjective: Interval History     Patient has not taken any showers since 9/12/19  Today she appears clean with no bad body odor emanating from her  She is disheveled and poorly groomed again but did attend the team meeting today  She was told that she needs to keep up with her ADLs by taking showers at least every 2 days which is the expectation from her and she acknowledged that  She does not now wish to see or talk to her brother   She continues to verbalize that she does not feel quite safe on the unit  She continues to tend to be suspicious about certain staff who gives her medications like her inhalers and the spirometer  She still complains of not breathing properly despite breathing appropriately and having nasal oxygen on at night with acceptable pulse ox when checked by staff  She claims not feeling right but does not appear lethargic as she claims to feel tired as an excuse for not attending groups  She has attended about 38% of groups last week  She continues to present with stilted speech being too formal as if talking in a court of law which has not changed either  She does not admit to any overt hallucinations or paranoia but still appears to harbor some covert  paranoia based on her demeanor and interaction  She continues to talk about 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 which is also a fixed belief  She has chosen not to to use the portable oxygen to get out of bed and go to groups if necessary  She remains guarded suspicious evasive and in denial of her illness on an ongoing basis    No current suicidal homicidal thoughts intent or plans reported  No overt hallucinations or other delusions reported   No signs or sxs of agranulocytosis or myocarditis or endocarditis and she is moving her bowels so far with no issues  She is still passive-aggressive and refuses to get out of bed or take showers or attend most of the groups despite claiming that she would and I have been reminding her constantly that she needs to at least attend to her ADLs skills and should also start going for more groups  She is eating meals and accepting meds and claims she feels sad over being in hospital but without any suicidal thoughts or crying spells       Current medications:    Current Facility-Administered Medications:     acetaminophen (TYLENOL) tablet 650 mg, 650 mg, Oral, Q6H PRN, Deep Durand MD    albuterol (PROVENTIL HFA,VENTOLIN HFA) inhaler 2 puff, 2 puff, Inhalation, Q4H PRN, Deep Durand MD, 2 puff at 07/25/19 1200    aluminum-magnesium hydroxide-simethicone (MYLANTA) 200-200-20 mg/5 mL oral suspension 15 mL, 15 mL, Oral, Q4H PRN, Deep Durand MD    ammonium lactate (LAC-HYDRIN) 12 % lotion 1 application, 1 application, Topical, BID PRN, Deep Durand MD    benzonatate (TESSALON PERLES) capsule 100 mg, 100 mg, Oral, TID PRN, Deep Durand MD    benztropine (COGENTIN) injection 1 mg, 1 mg, Intramuscular, Q8H PRN, Deep Durand MD    cloZAPine (CLOZARIL) tablet 50 mg, 50 mg, Oral, TID, Deep Durand MD, 50 mg at 09/16/19 0846    EPINEPHrine PF (ADRENALIN) 1 mg/mL injection 0 15 mg, 0 15 mg, Intramuscular, Once PRN, Deep Durand MD    FLUoxetine (PROzac) capsule 10 mg, 10 mg, Oral, Daily, Deep Durand MD, 10 mg at 09/16/19 0846    fluticasone-vilanterol (BREO ELLIPTA) 200-25 MCG/INH inhaler 1 puff, 1 puff, Inhalation, Daily, Deep Durand MD, 1 puff at 09/16/19 0846    ketotifen (ZADITOR) 0 025 % ophthalmic solution 1 drop, 1 drop, Right Eye, BID PRN, Deep Durand MD    levothyroxine tablet 125 mcg, 125 mcg, Oral, Early Morning, Deep Durand MD, 125 mcg at 09/16/19 0607    magnesium hydroxide (MILK OF MAGNESIA) 400 mg/5 mL oral suspension 30 mL, 30 mL, Oral, Daily PRN, Mynor Terry MD    montelukast (SINGULAIR) tablet 10 mg, 10 mg, Oral, HS, Mynor Terry MD, 10 mg at 09/15/19 2114    OLANZapine (ZyPREXA) IM injection 5 mg, 5 mg, Intramuscular, Q8H PRN, Mynor Terry MD    OLANZapine (ZyPREXA) tablet 5 mg, 5 mg, Oral, Q8H PRN, Mynor Terry MD    pantoprazole (PROTONIX) EC tablet 40 mg, 40 mg, Oral, Early Morning, Mynor Terry MD, 40 mg at 09/16/19 0607    polyethylene glycol (MIRALAX) packet 17 g, 17 g, Oral, Daily PRN, Mynor Terry MD    polyvinyl alcohol (LIQUIFILM TEARS) 1 4 % ophthalmic solution 1 drop, 1 drop, Both Eyes, Q3H PRN, Mynor Terry MD    theophylline (JEF-24) 24 hr capsule 200 mg, 200 mg, Oral, Daily, Mynor Terry MD, 200 mg at 09/16/19 0846    tiotropium (SPIRIVA) capsule for inhaler 18 mcg, 18 mcg, Inhalation, Daily, Mynor Terry MD, 18 mcg at 09/16/19 0846    traZODone (DESYREL) tablet 25 mg, 25 mg, Oral, HS PRN, Mynor Terry MD  Justification if on more than two antipsychotics:  Only on his clozapine  Side effects if any:  None    Risks , benefits, side effects and precautions of medications discussed with patient and reminded patient to let us know any problems with medications     Objective:     Vital Signs:  Vitals:    09/15/19 0730 09/15/19 1500 09/15/19 1943 09/15/19 2140   BP: 137/74 112/67 114/84    BP Location: Left arm Left arm Left arm    Pulse: 100 98 101    Resp: 17 18 18    Temp: 97 9 °F (36 6 °C) (!) 97 3 °F (36 3 °C) 97 5 °F (36 4 °C)    TempSrc: Temporal Temporal Temporal    SpO2:  93% 92% 92%   Weight: 68 4 kg (150 lb 12 8 oz)      Height:         Appearance:  age appropriate, casually dressed and overweight older than stated age   Behavior:  deviant, evasive and guarded and suspicious but with good eye contact   Speech:  normal pitch and normal volume but tends to become loud at times   Mood:  anxious and dysthymic Affect:  mood-congruent   Thought Process:  goal directed and illogical slightly pressured but able to be redirected and talks as if in a court of low being stilted   Thought Content:  delusions  grandiose and somatic about not being able to breathe despite being on nasal oxygen and paranoid about people out to harm her and Atrovent inhaler tainteded with peanut oil  No current suicidal homicidal thoughts intent or plans reported  No phobias obsessions or compulsions reported   Perceptual Disturbances: None and does not appear responding to internal stimuli   Risk Potential: Tendency to not care for herself if she goes off treatment   Sensorium:  person, place, time/date, situation, day of week, month of year and time   Cognition:  grossly intact   Consciousness:  alert and awake    Attention: Intact concentration and attention span   Intellect: Considered to be at least of average intelligence   Insight:  limited in denial   Judgment: poor      Motor Activity: no abnormal movements         Recent Labs:  Results Reviewed     None          I/O Past 24 hours:  No intake/output data recorded  No intake/output data recorded          Assessment / Plan:     Schizoaffective disorder, bipolar type ([facility] )      Reason for continued inpatient care:  Because of underlying paranoia and refusal to go back to the personal care home and inability to care for herself on her own  Acceptance by patient:  Accepting  [place] in recovery:  About living in another personal care home once she feels better  Understanding of medications :  Has some understanding  Involved in reintegration process:  Adjusting to the unit  Trusting in relatoinship with psychiatrist:  Trusting    Recommended Treatment:    Medication changes:  1) none today  Non-pharmacological treatments  1) continue with  individual therapy group therapy, milieu therapy and occupational therapy and milieu therapy involving multidisciplinary team approach with psychotherapist, case management, nursing, peer support services, art therapist etc using recovery principles and psycho-education about accepting illness and need for treatment   3) encourage to attend to ADLs like taking showers and wearing clean clothes   4) Encourage to attend groups   Safety  1) Safety/communication plan established targeting dynamic risk factors above  Discharge Plan most likely back to the a:  Personal care boarding home with act services once her delusions are managed and mood becomes more stabilized and she becomes more receptive to treatment compliance    Counseling / Coordination of Care    Total floor / unit time spent today 15 minutes  Greater than 50% of total time was spent with the patient and / or family counseling and / or coordination of care  A description of the counseling / coordination of care  Patient's Rights, confidentiality and exceptions to confidentiality, use of automated medical record, [facility] staff access to medical record, and consent to treatment reviewed      Krystyna Mcclain MD

## 2019-09-16 NOTE — PROGRESS NOTES
09/16/19 0938   Team Meeting   Meeting Type Tx Team Meeting   Initial Conference Date 09/16/19   Next Conference Date 09/23/19   Team Members Present   Team Members Present Physician;Nurse;; Other (Discipline and Name)   Physician Team Member Dr Joni Tavarez Team Member Laure Avilez, LISA   Care Management Team Member Brenda Donis   Other (Discipline and Name) [place], [place]   Patient/Family Present   Patient Present Yes   Patient's Family Present No     Patient attended treatment team meeting this morning without self assessment completed  Patient attended 38% of groups offered last week  Patient was granted off unit privileges for her efforts of attending groups  Informed she has to keep with her hygiene, however, to be able to go off unit  Patient focused on her brother, stating he was angry with her, wanting to control her and her siblings lives  She reports she doesn't want to see him for a couple of weeks because of this  She did have a good visit with her sister, however  Admits to some depression and anxiety  Believes it is due to the situation of her being in the hospital  States that she feels "off" like she is tired but it is difficult for her to explain  All blood work came back WNL however  No other questions/concerns noted

## 2019-09-16 NOTE — PLAN OF CARE
Problem: Alteration in Thoughts and Perception  Goal: Agree to be compliant with medication regime, as prescribed and report medication side effects  Description  Interventions:  - Offer appropriate PRN medication and supervise ingestion; conduct aims, as needed   Outcome: Progressing     Problem: PAIN - ADULT  Goal: Verbalizes/displays adequate comfort level or baseline comfort level  Description  Interventions:  - Encourage patient to monitor pain and request assistance  - Assess pain using appropriate pain scale  - Administer analgesics based on type and severity of pain and evaluate response  - Implement non-pharmacological measures as appropriate and evaluate response  - Consider cultural and social influences on pain and pain management  - Notify physician/advanced practitioner if interventions unsuccessful or patient reports new pain  Outcome: Progressing     Problem: SAFETY ADULT  Goal: Patient will remain free of falls  Description  INTERVENTIONS:  - Assess patient frequently for physical needs  -  Identify cognitive and physical deficits and behaviors that affect risk of falls    -  [place] fall precautions as indicated by assessment   - Educate patient/family on patient safety including physical limitations  - Instruct patient to call for assistance with activity based on assessment  - Modify environment to reduce risk of injury  - Consider OT/PT consult to assist with strengthening/mobility  Outcome: Progressing     Problem: RESPIRATORY - ADULT  Goal: Achieves optimal ventilation and oxygenation  Description  INTERVENTIONS:  - Assess for changes in respiratory status  - Assess for changes in mentation and behavior  - Position to facilitate oxygenation and minimize respiratory effort  - Oxygen administration by appropriate delivery method based on oxygen saturation (per order) or ABGs  - Initiate smoking cessation education as indicated  - Encourage broncho-pulmonary hygiene including cough, deep breathe, Incentive Spirometry  - Assess the need for suctioning and aspirate as needed  - Assess and instruct to report SOB or any respiratory difficulty  - Respiratory Therapy support as indicated  Outcome: Progressing   The patient slept through the night with no behaviors or issues noted  Oxygen maintained at 1 liter via NC while in bed  No s/s respiratory distress noted  Fall precautions maintained  Med compliant  Continue to monitor  Discharged

## 2019-09-16 NOTE — ASSESSMENT & PLAN NOTE
Psychiatry Progress Note    Subjective: Interval History     Patient has not taken any showers since 9/12/19  Today she appears clean with no bad body odor emanating from her  She is disheveled and poorly groomed again but did attend the team meeting today  She was told that she needs to keep up with her ADLs by taking showers at least every 2 days which is the expectation from her and she acknowledged that  She does not now wish to see or talk to her brother   She continues to verbalize that she does not feel quite safe on the unit  She continues to tend to be suspicious about certain staff who gives her medications like her inhalers and the spirometer  She still complains of not breathing properly despite breathing appropriately and having nasal oxygen on at night with acceptable pulse ox when checked by staff  She claims not feeling right but does not appear lethargic as she claims to feel tired as an excuse for not attending groups  She has attended about 38% of groups last week  She continues to present with stilted speech being too formal as if talking in a court of law which has not changed either  She does not admit to any overt hallucinations or paranoia but still appears to harbor some covert  paranoia based on her demeanor and interaction  She continues to talk about 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 which is also a fixed belief  She has chosen not to to use the portable oxygen to get out of bed and go to groups if necessary  She remains guarded suspicious evasive and in denial of her illness on an ongoing basis  No current suicidal homicidal thoughts intent or plans reported  No overt hallucinations or other delusions reported   No signs or sxs of agranulocytosis or myocarditis or endocarditis and she is moving her bowels so far with no issues    She is still passive-aggressive and refuses to get out of bed or take showers or attend most of the groups despite claiming that she would and I have been reminding her constantly that she needs to at least attend to her ADLs skills and should also start going for more groups  She is eating meals and accepting meds and claims she feels sad over being in hospital but without any suicidal thoughts or crying spells       Current medications:    Current Facility-Administered Medications:     acetaminophen (TYLENOL) tablet 650 mg, 650 mg, Oral, Q6H PRN, Meldon Curling, MD    albuterol (PROVENTIL HFA,VENTOLIN HFA) inhaler 2 puff, 2 puff, Inhalation, Q4H PRN, Meldon Curling, MD, 2 puff at 07/25/19 1200    aluminum-magnesium hydroxide-simethicone (MYLANTA) 200-200-20 mg/5 mL oral suspension 15 mL, 15 mL, Oral, Q4H PRN, Meldon Curling, MD    ammonium lactate (LAC-HYDRIN) 12 % lotion 1 application, 1 application, Topical, BID PRN, Meldon Curling, MD    benzonatate (TESSALON PERLES) capsule 100 mg, 100 mg, Oral, TID PRN, Meldon Curling, MD    benztropine (COGENTIN) injection 1 mg, 1 mg, Intramuscular, Q8H PRN, Meldon Curling, MD    cloZAPine (CLOZARIL) tablet 50 mg, 50 mg, Oral, TID, Meldon Curling, MD, 50 mg at 09/16/19 0846    EPINEPHrine PF (ADRENALIN) 1 mg/mL injection 0 15 mg, 0 15 mg, Intramuscular, Once PRN, Meldon Curling, MD    FLUoxetine (PROzac) capsule 10 mg, 10 mg, Oral, Daily, Meldon Curling, MD, 10 mg at 09/16/19 0846    fluticasone-vilanterol (BREO ELLIPTA) 200-25 MCG/INH inhaler 1 puff, 1 puff, Inhalation, Daily, Meldon Curling, MD, 1 puff at 09/16/19 0846    ketotifen (ZADITOR) 0 025 % ophthalmic solution 1 drop, 1 drop, Right Eye, BID PRN, Meldon Curling, MD    levothyroxine tablet 125 mcg, 125 mcg, Oral, Early Morning, Meldon Curling, MD, 125 mcg at 09/16/19 0607    magnesium hydroxide (MILK OF MAGNESIA) 400 mg/5 mL oral suspension 30 mL, 30 mL, Oral, Daily PRN, Meldon Curling, MD    montelukast (SINGULAIR) tablet 10 mg, 10 mg, Oral, HS, Meldon Curling, MD, 10 mg at 09/15/19 2114    OLANZapine (ZyPREXA) IM injection 5 mg, 5 mg, Intramuscular, Q8H PRN, Ervin Little MD    OLANZapine (ZyPREXA) tablet 5 mg, 5 mg, Oral, Q8H PRN, Ervin Little MD    pantoprazole (PROTONIX) EC tablet 40 mg, 40 mg, Oral, Early Morning, Ervin Little MD, 40 mg at 09/16/19 0607    polyethylene glycol (MIRALAX) packet 17 g, 17 g, Oral, Daily PRN, Ervin Little MD    polyvinyl alcohol (LIQUIFILM TEARS) 1 4 % ophthalmic solution 1 drop, 1 drop, Both Eyes, Q3H PRN, Ervin Little MD    theophylline (JEF-24) 24 hr capsule 200 mg, 200 mg, Oral, Daily, Ervin Little MD, 200 mg at 09/16/19 0846    tiotropium (SPIRIVA) capsule for inhaler 18 mcg, 18 mcg, Inhalation, Daily, Ervin Little MD, 18 mcg at 09/16/19 0846    traZODone (DESYREL) tablet 25 mg, 25 mg, Oral, HS PRN, Ervin Little MD  Justification if on more than two antipsychotics:  Only on his clozapine  Side effects if any:  None    Risks , benefits, side effects and precautions of medications discussed with patient and reminded patient to let us know any problems with medications     Objective:     Vital Signs:  Vitals:    09/15/19 0730 09/15/19 1500 09/15/19 1943 09/15/19 2140   BP: 137/74 112/67 114/84    BP Location: Left arm Left arm Left arm    Pulse: 100 98 101    Resp: 17 18 18    Temp: 97 9 °F (36 6 °C) (!) 97 3 °F (36 3 °C) 97 5 °F (36 4 °C)    TempSrc: Temporal Temporal Temporal    SpO2:  93% 92% 92%   Weight: 68 4 kg (150 lb 12 8 oz)      Height:         Appearance:  age appropriate, casually dressed and overweight older than stated age   Behavior:  deviant, evasive and guarded and suspicious but with good eye contact   Speech:  normal pitch and normal volume but tends to become loud at times   Mood:  anxious and dysthymic   Affect:  mood-congruent   Thought Process:  goal directed and illogical slightly pressured but able to be redirected and talks as if in a court of low being stilted   Thought Content:  delusions  grandiose and somatic about not being able to breathe despite being on nasal oxygen and paranoid about people out to harm her and Atrovent inhaler tainteded with peanut oil  No current suicidal homicidal thoughts intent or plans reported  No phobias obsessions or compulsions reported   Perceptual Disturbances: None and does not appear responding to internal stimuli   Risk Potential: Tendency to not care for herself if she goes off treatment   Sensorium:  person, place, time/date, situation, day of week, month of year and time   Cognition:  grossly intact   Consciousness:  alert and awake    Attention: Intact concentration and attention span   Intellect: Considered to be at least of average intelligence   Insight:  limited in denial   Judgment: poor      Motor Activity: no abnormal movements         Recent Labs:  Results Reviewed     None          I/O Past 24 hours:  No intake/output data recorded  No intake/output data recorded  Assessment / Plan:     Schizoaffective disorder, bipolar type ([facility] 75 )      Reason for continued inpatient care:  Because of underlying paranoia and refusal to go back to the personal care home and inability to care for herself on her own  Acceptance by patient:  Accepting  Filomena Powell in recovery:  About living in another personal care home once she feels better  Understanding of medications :  Has some understanding  Involved in reintegration process:  Adjusting to the unit  Trusting in relatoinship with psychiatrist:  Trusting    Recommended Treatment:    Medication changes:  1) none today  Non-pharmacological treatments  1) continue with  individual therapy group therapy, milieu therapy and occupational therapy and milieu therapy involving multidisciplinary team approach with psychotherapist, case management, nursing, peer support services, art therapist etc using recovery principles and psycho-education about accepting illness and need for treatment     3) encourage to attend to ADLs like taking showers and wearing clean clothes   4) Encourage to attend groups   Safety  1) Safety/communication plan established targeting dynamic risk factors above  Discharge Plan most likely back to the a:  Personal care boarding home with [facility] services once her delusions are managed and mood becomes more stabilized and she becomes more receptive to treatment compliance    Counseling / Coordination of Care    Total floor / unit time spent today 15 minutes  Greater than 50% of total time was spent with the patient and / or family counseling and / or coordination of care  A description of the counseling / coordination of care  Patient's Rights, confidentiality and exceptions to confidentiality, use of automated medical record, [address] [place] [address] staff access to medical record, and consent to treatment reviewed      Allie Guzman MD

## 2019-09-17 PROCEDURE — 99231 SBSQ HOSP IP/OBS SF/LOW 25: CPT | Performed by: PSYCHIATRY & NEUROLOGY

## 2019-09-17 RX ADMIN — FLUTICASONE FUROATE AND VILANTEROL TRIFENATATE 1 PUFF: 200; 25 POWDER RESPIRATORY (INHALATION) at 08:37

## 2019-09-17 RX ADMIN — THEOPHYLLINE ANHYDROUS 200 MG: 200 CAPSULE, EXTENDED RELEASE ORAL at 08:37

## 2019-09-17 RX ADMIN — TIOTROPIUM BROMIDE 18 MCG: 18 CAPSULE ORAL; RESPIRATORY (INHALATION) at 08:37

## 2019-09-17 RX ADMIN — LEVOTHYROXINE SODIUM 125 MCG: 125 TABLET ORAL at 06:02

## 2019-09-17 RX ADMIN — FLUOXETINE 10 MG: 10 CAPSULE ORAL at 08:37

## 2019-09-17 RX ADMIN — PANTOPRAZOLE SODIUM 40 MG: 40 TABLET, DELAYED RELEASE ORAL at 06:02

## 2019-09-17 RX ADMIN — CLOZAPINE 50 MG: 25 TABLET ORAL at 17:04

## 2019-09-17 RX ADMIN — CLOZAPINE 50 MG: 25 TABLET ORAL at 21:37

## 2019-09-17 RX ADMIN — CLOZAPINE 50 MG: 25 TABLET ORAL at 08:37

## 2019-09-17 RX ADMIN — MONTELUKAST SODIUM 10 MG: 10 TABLET, COATED ORAL at 21:37

## 2019-09-17 NOTE — PLAN OF CARE
Problem: Alteration in Thoughts and Perception  Goal: Agree to be compliant with medication regime, as prescribed and report medication side effects  Description  Interventions:  - Offer appropriate PRN medication and supervise ingestion; conduct aims, as needed   Outcome: Progressing     Problem: Risk for Self Injury/Neglect  Goal: Refrain from harming self  Description  Interventions:  - Monitor patient closely, per order  - Develop a trusting relationship  - Supervise medication ingestion, monitor effects and side effects   Outcome: Progressing     Problem: Alteration in Orientation  Goal: Interact with staff daily  Description  Interventions:  - Assess and re-assess patient's level of orientation  - Engage patient in 1 on 1 interactions, daily, for a minimum of 15 minutes   - Establish rapport/trust with patient   Outcome: Progressing     Problem: SAFETY ADULT  Goal: Patient will remain free of falls  Description  INTERVENTIONS:  - Assess patient frequently for physical needs  -  Identify cognitive and physical deficits and behaviors that affect risk of falls    -  [place] fall precautions as indicated by assessment   - Educate patient/family on patient safety including physical limitations  - Instruct patient to call for assistance with activity based on assessment  - Modify environment to reduce risk of injury  - Consider OT/PT consult to assist with strengthening/mobility  Outcome: Progressing     Problem: RESPIRATORY - ADULT  Goal: Achieves optimal ventilation and oxygenation  Description  INTERVENTIONS:  - Assess for changes in respiratory status  - Assess for changes in mentation and behavior  - Position to facilitate oxygenation and minimize respiratory effort  - Oxygen administration by appropriate delivery method based on oxygen saturation (per order) or ABGs  - Initiate smoking cessation education as indicated  - Encourage broncho-pulmonary hygiene including cough, deep breathe, Incentive Spirometry  - Assess the need for suctioning and aspirate as needed  - Assess and instruct to report SOB or any respiratory difficulty  - Respiratory Therapy support as indicated  Outcome: Progressing     Problem: Alteration in Thoughts and Perception  Goal: Attend and participate in unit activities, including therapeutic, recreational, and educational groups  Description  Interventions:  - Provide therapeutic and educational activities daily, encourage attendance and participation, and document same in the medical record     CERTIFIED PEER SPECIALIST INTERVENTIONS:    Complete peer assessment with patient to assess their needs and identify their goals to complete while in the recovery program as well as once discharged into the community  Patient will complete WRAP Plan, Crisis Plan and 5 Life Domains  Patient will attend 50% of groups offered on the unit  Patient will complete a goal card weekly  Outcome: Not Progressing  Goal: Complete daily ADLs, including personal hygiene independently, as able  Description  Interventions:  - Observe, teach, and assist patient with ADLS  - Monitor and promote a balance of rest/activity, with adequate nutrition and elimination   Outcome: Not Progressing     Problem: Depression  Goal: Refrain from isolation  Description  Interventions:  - Develop a trusting relationship   - Encourage socialization   Outcome: Not Progressing  Goal: Refrain from self-neglect  Outcome: Not Laina Dawn has been isolative to her room most of the shift  She came out with prompting for medication and meal  Ate 100% of her supper  Incentive spirometer was taken to her in her room  Did 10 breaths and got to 900ml  Oxygen saturation 92% on room air  No complaint of shortness of breath  Still has not showered  Incentive spirometer again at 1930 and got to 1000ml  Did not attend evening group  Napped at intervals  Did not eat snack  Had to be prompted for HS medication   Oxygen saturation 91% prior to oxygen 1 liter being applied before bed  Continue to monitor  Precautions maintained

## 2019-09-17 NOTE — PLAN OF CARE
Problem: Alteration in Thoughts and Perception  Goal: Attend and participate in unit activities, including therapeutic, recreational, and educational groups  Description  Interventions:  - Provide therapeutic and educational activities daily, encourage attendance and participation, and document same in the medical record     CERTIFIED PEER SPECIALIST INTERVENTIONS:    Complete peer assessment with patient to assess their needs and identify their goals to complete while in the recovery program as well as once discharged into the community  Patient will complete WRAP Plan, Crisis Plan and 5 Life Domains  Patient will attend 50% of groups offered on the unit  Patient will complete a goal card weekly  Outcome: Not Progressing  Goal: Complete daily ADLs, including personal hygiene independently, as able  Description  Interventions:  - Observe, teach, and assist patient with ADLS  - Monitor and promote a balance of rest/activity, with adequate nutrition and elimination   Outcome: Not Progressing    Individual has been isolative, in bed all shift, visible only for meals  When writer approached she did not make eye contact and she was delayed in responding  RN expressed concern related to the lack of motivation and low energy level  She did not participate in programming, no groups attended  Also discussed lack in personal hygiene, last recorded shower 9/12/19  Compliant with her medications  Appetite good  Vital signs stable  Availability of staff made known  Will continue to monitor

## 2019-09-17 NOTE — PROGRESS NOTES
09/17/19 0900   Team Meeting   Meeting Type Daily Rounds   Team Members Present   Team Members Present Physician;Nurse;; Other (Discipline and Name)   Physician Team Member Dr Pia Edwards Team Member Roverto Gonzalez, LISA   Care Management Team Member Brenda Johnson   Other (Discipline and Name) DIANA Roberson     Patient still has not showered since the 12th  Complaining of feeling too tired to do her ADLs  Attended IMR only  Slept

## 2019-09-17 NOTE — PLAN OF CARE
Problem: PAIN - ADULT  Goal: Verbalizes/displays adequate comfort level or baseline comfort level  Description  Interventions:  - Encourage patient to monitor pain and request assistance  - Assess pain using appropriate pain scale  - Administer analgesics based on type and severity of pain and evaluate response  - Implement non-pharmacological measures as appropriate and evaluate response  - Consider cultural and social influences on pain and pain management  - Notify physician/advanced practitioner if interventions unsuccessful or patient reports new pain  Outcome: Progressing     Problem: SAFETY ADULT  Goal: Patient will remain free of falls  Description  INTERVENTIONS:  - Assess patient frequently for physical needs  -  Identify cognitive and physical deficits and behaviors that affect risk of falls    -  [place] fall precautions as indicated by assessment   - Educate patient/family on patient safety including physical limitations  - Instruct patient to call for assistance with activity based on assessment  - Modify environment to reduce risk of injury  - Consider OT/PT consult to assist with strengthening/mobility  Outcome: Progressing     Problem: RESPIRATORY - ADULT  Goal: Achieves optimal ventilation and oxygenation  Description  INTERVENTIONS:  - Assess for changes in respiratory status  - Assess for changes in mentation and behavior  - Position to facilitate oxygenation and minimize respiratory effort  - Oxygen administration by appropriate delivery method based on oxygen saturation (per order) or ABGs  - Initiate smoking cessation education as indicated  - Encourage broncho-pulmonary hygiene including cough, deep breathe, Incentive Spirometry  - Assess the need for suctioning and aspirate as needed  - Assess and instruct to report SOB or any respiratory difficulty  - Respiratory Therapy support as indicated  Outcome: Roby Khan maintained on fall and SAFE precaution without incident on this shift, thus far  Laying in bed with 1L of O2 with humidifier via NC  Q 15 minutes checks during rounds continues  No behavioral noted  No SOB notes  Will continue to monitor

## 2019-09-17 NOTE — PROGRESS NOTES
Progress Note - Faustino Monsivais 1955, 60 y o  female MRN: [id number]    Unit/Bed#: [facility] 871-93 Encounter: [id number]    Primary Care Provider: Bradley Ledezma MD   Date and time admitted to hospital: 7/23/2019  5:30 PM        * Schizoaffective disorder, bipolar type [facility])  Assessment & Plan  Psychiatry Progress Note    Subjective: Interval History     Patient has not taken any showers since 9/12/19 despite reminders daily to get out of bed, take showers and attending groups and wear clean clothes  She continues to keep her hair uncombed and prefers to lay back on her bed  She was again reminded that she needs to keep up with her ADLs by taking showers at least every 2 days which is the expectation from her and she acknowledged that  She does not now wish to see or talk to her brother   She continues to verbalize that she does not feel quite safe on the unit becoming psychosomatic  She continues to tend to be suspicious about certain staff who gives her medications like her inhalers and the spirometer  She still complains of not breathing properly despite breathing appropriately and having nasal oxygen on at night with acceptable pulse ox when checked by staff  She claims not feeling right but does not appear lethargic as she claims to feel tired as an excuse for not attending groups  She has attended about 38% of groups last week  She claims to feel sad or being in the hospital this long but does not report any crying spells or suicidal thoughts at all  She continues to present with stilted speech being too formal as if talking in a court of law which has not changed either  She does not admit to any overt hallucinations or paranoia but still appears to harbor some covert  paranoia based on her demeanor and interaction  She still claims that there is a 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    She has chosen not to to use the portable oxygen to get out of bed and go to groups if necessary  She remains guarded suspicious evasive and in denial of her illness on an ongoing basis  No current suicidal homicidal thoughts intent or plans reported  No overt hallucinations or other delusions reported   No signs or sxs of agranulocytosis or myocarditis or endocarditis and she is moving her bowels so far with no issues  She is still passive-aggressive and refuses to get out of bed or take showers or attend most of the groups despite claiming that she would and I have been reminding her constantly that she needs to at least attend to her ADLs skills and should also start going for more groups   She is eating meals and accepting meds  Current medications:    Current Facility-Administered Medications:     acetaminophen (TYLENOL) tablet 650 mg, 650 mg, Oral, Q6H PRN, Isidoro Gonzalez MD    albuterol (PROVENTIL HFA,VENTOLIN HFA) inhaler 2 puff, 2 puff, Inhalation, Q4H PRN, Isidoro Gonzalez MD, 2 puff at 07/25/19 1200    aluminum-magnesium hydroxide-simethicone (MYLANTA) 200-200-20 mg/5 mL oral suspension 15 mL, 15 mL, Oral, Q4H PRN, Isidoro Gonzalez MD    ammonium lactate (LAC-HYDRIN) 12 % lotion 1 application, 1 application, Topical, BID PRN, Isidoro Gonzalez MD    benzonatate (TESSALON PERLES) capsule 100 mg, 100 mg, Oral, TID PRN, Isidoro Gonzalez MD    benztropine (COGENTIN) injection 1 mg, 1 mg, Intramuscular, Q8H PRN, Isidoro Gonzalez MD    cloZAPine (CLOZARIL) tablet 50 mg, 50 mg, Oral, TID, Isidoro Gonzalez MD, 50 mg at 09/17/19 0837    EPINEPHrine PF (ADRENALIN) 1 mg/mL injection 0 15 mg, 0 15 mg, Intramuscular, Once PRN, Isidoro Gonzalez MD    FLUoxetine (PROzac) capsule 10 mg, 10 mg, Oral, Daily, Isidoro Gonzalez MD, 10 mg at 09/17/19 0837    fluticasone-vilanterol (BREO ELLIPTA) 200-25 MCG/INH inhaler 1 puff, 1 puff, Inhalation, Daily, Isidoro Gonzalez MD, 1 puff at 09/17/19 0837    ketotifen (ZADITOR) 0 025 % ophthalmic solution 1 drop, 1 drop, Right Eye, BID PRN, Isidoro Gonzalez MD    levothyroxine tablet 125 mcg, 125 mcg, Oral, Early Morning, Sarah Hanna MD, 125 mcg at 09/17/19 0602    magnesium hydroxide (MILK OF MAGNESIA) 400 mg/5 mL oral suspension 30 mL, 30 mL, Oral, Daily PRN, Sarah Hanna MD    montelukast (SINGULAIR) tablet 10 mg, 10 mg, Oral, HS, Sarah Hanna MD, 10 mg at 09/16/19 2131    OLANZapine (ZyPREXA) IM injection 5 mg, 5 mg, Intramuscular, Q8H PRN, Sarah Hanna MD    OLANZapine (ZyPREXA) tablet 5 mg, 5 mg, Oral, Q8H PRN, Sarah Hanna MD    pantoprazole (PROTONIX) EC tablet 40 mg, 40 mg, Oral, Early Morning, Sarah Hanna MD, 40 mg at 09/17/19 0602    polyethylene glycol (MIRALAX) packet 17 g, 17 g, Oral, Daily PRN, Sarah Hanna MD    polyvinyl alcohol (LIQUIFILM TEARS) 1 4 % ophthalmic solution 1 drop, 1 drop, Both Eyes, Q3H PRN, Sarah Hanna MD    theophylline (JEF-24) 24 hr capsule 200 mg, 200 mg, Oral, Daily, Sarah Hanna MD, 200 mg at 09/17/19 0837    tiotropium (SPIRIVA) capsule for inhaler 18 mcg, 18 mcg, Inhalation, Daily, Sarah Hanna MD, 18 mcg at 09/17/19 0837    traZODone (DESYREL) tablet 25 mg, 25 mg, Oral, HS PRN, Sarah Hanna MD  Justification if on more than two antipsychotics:  Only on his clozapine  Side effects if any:  None    Risks , benefits, side effects and precautions of medications discussed with patient and reminded patient to let us know any problems with medications     Objective:     Vital Signs:  Vitals:    09/16/19 2003 09/16/19 2130 09/17/19 0730 09/17/19 0732   BP: 96/66  124/62 106/55   BP Location: Left arm  Left arm Left arm   Pulse: (!) 108  95 104   Resp: 14  16    Temp: (!) 97 3 °F (36 3 °C)  98 1 °F (36 7 °C)    TempSrc: Temporal  Temporal    SpO2: 93% 93%     Weight:       Height:         Appearance:  age appropriate, casually dressed and overweight older than stated age   Behavior:  deviant, evasive and guarded and suspicious but with good eye contact   Speech:  normal pitch and normal volume but tends to become loud at times   Mood:  anxious and dysthymic Affect:  mood-congruent   Thought Process:  goal directed and illogical slightly pressured but able to be redirected and talks as if in a court of low being stilted   Thought Content:  delusions  grandiose and somatic about not being able to breathe despite being on nasal oxygen and paranoid about people out to harm her and Atrovent inhaler tainteded with peanut oil  No current suicidal homicidal thoughts intent or plans reported  No phobias obsessions or compulsions reported   Perceptual Disturbances: None and does not appear responding to internal stimuli   Risk Potential: Tendency to not care for herself if she goes off treatment   Sensorium:  person, place, time/date, situation, day of week, month of year and time   Cognition:  grossly intact   Consciousness:  alert and awake    Attention: Intact concentration and attention span   Intellect: Considered to be at least of average intelligence   Insight:  limited in denial   Judgment: poor      Motor Activity: no abnormal movements         Recent Labs:  Results Reviewed     None          I/O Past 24 hours:  No intake/output data recorded  No intake/output data recorded          Assessment / Plan:     Schizoaffective disorder, bipolar type ([facility] )      Reason for continued inpatient care:  Because of underlying paranoia and refusal to go back to the personal care home and inability to care for herself on her own  Acceptance by patient:  Accepting  [place] in recovery:  About living in another personal care home once she feels better  Understanding of medications :  Has some understanding  Involved in reintegration process:  Adjusting to the unit  Trusting in relatoinship with psychiatrist:  Trusting    Recommended Treatment:    Medication changes:  1) none today  Non-pharmacological treatments  1) continue with  individual therapy group therapy, milieu therapy and occupational therapy and milieu therapy involving multidisciplinary team approach with psychotherapist, case management, nursing, peer support services, art therapist etc using recovery principles and psycho-education about accepting illness and need for treatment   3) encourage to attend to ADLs like taking showers and wearing clean clothes   4) Encourage to attend groups   Safety  1) Safety/communication plan established targeting dynamic risk factors above  Discharge Plan most likely back to the a:  Personal care boarding home with act services once her delusions are managed and mood becomes more stabilized and she becomes more receptive to treatment compliance    Counseling / Coordination of Care    Total floor / unit time spent today 15 minutes  Greater than 50% of total time was spent with the patient and / or family counseling and / or coordination of care  A description of the counseling / coordination of care  Patient's Rights, confidentiality and exceptions to confidentiality, use of automated medical record, [facility] record, and consent to treatment reviewed      Dalton Garner MD

## 2019-09-17 NOTE — ASSESSMENT & PLAN NOTE
Psychiatry Progress Note    Subjective: Interval History     Patient has not taken any showers since 9/12/19 despite reminders daily to get out of bed, take showers and attending groups and wear clean clothes  She continues to keep her hair uncombed and prefers to lay back on her bed  She was again reminded that she needs to keep up with her ADLs by taking showers at least every 2 days which is the expectation from her and she acknowledged that  She does not now wish to see or talk to her brother   She continues to verbalize that she does not feel quite safe on the unit becoming psychosomatic  She continues to tend to be suspicious about certain staff who gives her medications like her inhalers and the spirometer  She still complains of not breathing properly despite breathing appropriately and having nasal oxygen on at night with acceptable pulse ox when checked by staff  She claims not feeling right but does not appear lethargic as she claims to feel tired as an excuse for not attending groups  She has attended about 38% of groups last week  She claims to feel sad or being in the hospital this long but does not report any crying spells or suicidal thoughts at all  She continues to present with stilted speech being too formal as if talking in a court of law which has not changed either  She does not admit to any overt hallucinations or paranoia but still appears to harbor some covert  paranoia based on her demeanor and interaction  She still claims that there is a 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   She has chosen not to to use the portable oxygen to get out of bed and go to groups if necessary  She remains guarded suspicious evasive and in denial of her illness on an ongoing basis  No current suicidal homicidal thoughts intent or plans reported  No overt hallucinations or other delusions reported    No signs or sxs of agranulocytosis or myocarditis or endocarditis and she is moving her bowels so far with no issues  She is still passive-aggressive and refuses to get out of bed or take showers or attend most of the groups despite claiming that she would and I have been reminding her constantly that she needs to at least attend to her ADLs skills and should also start going for more groups   She is eating meals and accepting meds  Current medications:    Current Facility-Administered Medications:     acetaminophen (TYLENOL) tablet 650 mg, 650 mg, Oral, Q6H PRN, Deep Durand MD    albuterol (PROVENTIL HFA,VENTOLIN HFA) inhaler 2 puff, 2 puff, Inhalation, Q4H PRN, Deep Durand MD, 2 puff at 07/25/19 1200    aluminum-magnesium hydroxide-simethicone (MYLANTA) 200-200-20 mg/5 mL oral suspension 15 mL, 15 mL, Oral, Q4H PRN, Deep Durand MD    ammonium lactate (LAC-HYDRIN) 12 % lotion 1 application, 1 application, Topical, BID PRN, Deep Durand MD    benzonatate (TESSALON PERLES) capsule 100 mg, 100 mg, Oral, TID PRN, Deep Durand MD    benztropine (COGENTIN) injection 1 mg, 1 mg, Intramuscular, Q8H PRN, Deep Durand MD    cloZAPine (CLOZARIL) tablet 50 mg, 50 mg, Oral, TID, Deep Durand MD, 50 mg at 09/17/19 0837    EPINEPHrine PF (ADRENALIN) 1 mg/mL injection 0 15 mg, 0 15 mg, Intramuscular, Once PRN, Deep Durand MD    FLUoxetine (PROzac) capsule 10 mg, 10 mg, Oral, Daily, Deep Durand MD, 10 mg at 09/17/19 0837    fluticasone-vilanterol (BREO ELLIPTA) 200-25 MCG/INH inhaler 1 puff, 1 puff, Inhalation, Daily, Deep Durand MD, 1 puff at 09/17/19 0837    ketotifen (ZADITOR) 0 025 % ophthalmic solution 1 drop, 1 drop, Right Eye, BID PRN, Deep Durand MD    levothyroxine tablet 125 mcg, 125 mcg, Oral, Early Morning, Deep Durand MD, 125 mcg at 09/17/19 0602    magnesium hydroxide (MILK OF MAGNESIA) 400 mg/5 mL oral suspension 30 mL, 30 mL, Oral, Daily PRN, Deep Durand MD    montelukast (SINGULAIR) tablet 10 mg, 10 mg, Oral, HS, Deep Durand MD, 10 mg at 09/16/19 2131    OLANZapine (ZyPREXA) IM injection 5 mg, 5 mg, Intramuscular, Q8H PRN, Jennifer Taveras MD    OLANZapine (ZyPREXA) tablet 5 mg, 5 mg, Oral, Q8H PRN, Jennifer Taveras MD    pantoprazole (PROTONIX) EC tablet 40 mg, 40 mg, Oral, Early Morning, Jennifer Taveras MD, 40 mg at 09/17/19 0602    polyethylene glycol (MIRALAX) packet 17 g, 17 g, Oral, Daily PRN, Jennifer Taveras MD    polyvinyl alcohol (LIQUIFILM TEARS) 1 4 % ophthalmic solution 1 drop, 1 drop, Both Eyes, Q3H PRN, Jennifer Taveras MD    theophylline (JEF-24) 24 hr capsule 200 mg, 200 mg, Oral, Daily, Jennifer Taveras MD, 200 mg at 09/17/19 0837    tiotropium (SPIRIVA) capsule for inhaler 18 mcg, 18 mcg, Inhalation, Daily, Jennifer Taveras MD, 18 mcg at 09/17/19 0837    traZODone (DESYREL) tablet 25 mg, 25 mg, Oral, HS PRN, Jennifer Taveras MD  Justification if on more than two antipsychotics:  Only on his clozapine  Side effects if any:  None    Risks , benefits, side effects and precautions of medications discussed with patient and reminded patient to let us know any problems with medications     Objective:     Vital Signs:  Vitals:    09/16/19 2003 09/16/19 2130 09/17/19 0730 09/17/19 0732   BP: 96/66  124/62 106/55   BP Location: Left arm  Left arm Left arm   Pulse: (!) 108  95 104   Resp: 14  16    Temp: (!) 97 3 °F (36 3 °C)  98 1 °F (36 7 °C)    TempSrc: Temporal  Temporal    SpO2: 93% 93%     Weight:       Height:         Appearance:  age appropriate, casually dressed and overweight older than stated age   Behavior:  deviant, evasive and guarded and suspicious but with good eye contact   Speech:  normal pitch and normal volume but tends to become loud at times   Mood:  anxious and dysthymic   Affect:  mood-congruent   Thought Process:  goal directed and illogical slightly pressured but able to be redirected and talks as if in a court of low being stilted   Thought Content:  delusions  grandiose and somatic about not being able to breathe despite being on nasal oxygen and paranoid about people out to harm her and Atrovent inhaler tainteded with peanut oil  No current suicidal homicidal thoughts intent or plans reported  No phobias obsessions or compulsions reported   Perceptual Disturbances: None and does not appear responding to internal stimuli   Risk Potential: Tendency to not care for herself if she goes off treatment   Sensorium:  person, place, time/date, situation, day of week, month of year and time   Cognition:  grossly intact   Consciousness:  alert and awake    Attention: Intact concentration and attention span   Intellect: Considered to be at least of average intelligence   Insight:  limited in denial   Judgment: poor      Motor Activity: no abnormal movements         Recent Labs:  Results Reviewed     None          I/O Past 24 hours:  No intake/output data recorded  No intake/output data recorded  Assessment / Plan:     Schizoaffective disorder, bipolar type ([facility] )      Reason for continued inpatient care:  Because of underlying paranoia and refusal to go back to the personal care home and inability to care for herself on her own  Acceptance by patient:  Accepting  Ros Patel in recovery:  About living in another personal care home once she feels better  Understanding of medications :  Has some understanding  Involved in reintegration process:  Adjusting to the unit  Trusting in relatoinship with psychiatrist:  Trusting    Recommended Treatment:    Medication changes:  1) none today  Non-pharmacological treatments  1) continue with  individual therapy group therapy, milieu therapy and occupational therapy and milieu therapy involving multidisciplinary team approach with psychotherapist, case management, nursing, peer support services, art therapist etc using recovery principles and psycho-education about accepting illness and need for treatment     3) encourage to attend to ADLs like taking showers and wearing clean clothes   4) Encourage to attend groups   Safety  1) Safety/communication plan established targeting dynamic risk factors above  Discharge Plan most likely back to the a:  Personal care boarding home with act services once her delusions are managed and mood becomes more stabilized and she becomes more receptive to treatment compliance    Counseling / Coordination of Care    Total floor / unit time spent today 15 minutes  Greater than 50% of total time was spent with the patient and / or family counseling and / or coordination of care  A description of the counseling / coordination of care  Patient's Rights, confidentiality and exceptions to confidentiality, use of automated medical record, [address] [place] [address] staff access to medical record, and consent to treatment reviewed      Meldon Curling, MD

## 2019-09-17 NOTE — PLAN OF CARE
Problem: Alteration in Thoughts and Perception  Goal: Agree to be compliant with medication regime, as prescribed and report medication side effects  Description  Interventions:  - Offer appropriate PRN medication and supervise ingestion; conduct aims, as needed   Outcome: Progressing     Problem: Alteration in Thoughts and Perception  Goal: Attend and participate in unit activities, including therapeutic, recreational, and educational groups  Description  Interventions:  - Provide therapeutic and educational activities daily, encourage attendance and participation, and document same in the medical record     CERTIFIED PEER SPECIALIST INTERVENTIONS:    Complete peer assessment with patient to assess their needs and identify their goals to complete while in the recovery program as well as once discharged into the community  Patient will complete WRAP Plan, Crisis Plan and [facility] Life Domains  Patient will attend 50% of groups offered on the unit  Patient will complete a goal card weekly  Outcome: Not Progressing  Goal: Complete daily ADLs, including personal hygiene independently, as able  Description  Interventions:  - Observe, teach, and assist patient with ADLS  - Monitor and promote a balance of rest/activity, with adequate nutrition and elimination   Outcome: Not Progressing     Problem: Ineffective Coping  Goal: Demonstrates healthy coping skills  Outcome: Not Progressing     Problem: Depression  Goal: Refrain from isolation  Description  Interventions:  - Develop a trusting relationship   - Encourage socialization   Outcome: Not Progressing     [address] continues to isolate in her room in bed  Maintains is excessively tired today, more so than recent days, just wants to sleep  This nurse approached her to comb out her rapidly matting hair  She declined; "Not now  I just woke up " Presented to her this was the time the nurse was free; if not now, not @ all tonight  Still declined   Reminded her that combing out hair daily is something she should be doing for herself as part of daily hygiene care  Responded w/silence  She did emerge from room to eat 75% of meal, to get scheduled medicines, to have HS snack  Did not attend PM Group  Did do her Incentive Spirometry; better volumes late in shift (900-1000ml) than earlier (750ml)  Is wearing her QHS humidified nasal O2 @ 1L now for bed

## 2019-09-17 NOTE — PLAN OF CARE
Problem: DISCHARGE PLANNING  Goal: Discharge to home or other facility with appropriate resources  Description  INTERVENTIONS:  - Conduct assessment to determine patient/family and health care team treatment goals, and need for post-acute services based on payer coverage, community resources, and patient preferences, and barriers to discharge  - Address psychosocial, clinical, and financial barriers to discharge as identified in assessment in conjunction with the patient/family and health care team  - Assist the patient in reintegration back into the community by removing barriers which may hinder a successful discharge once deemed stable  - Arrange appropriate level of post-acute services according to patient's needs and preference and payer coverage in collaboration with the physician and health care team  - Communicate with and update the patient/family, physician, and health care team regarding progress on the discharge plan  - Arrange appropriate transportation to post-acute venues    Outcome: Not Progressing     Patient is still not caring for herself appropriately and discussions of [facility] have been coming up  CM proactively reached out to [place] [id number], inquiring about the [facility] referral that was completed on [facility] prior to her admission  Johanna Acosta stated the referral was no longer active and that they are no accepting any [facility] referrals at this time as it is not an option for the foreseeable future and the [facility] will not support the referrals  CM will follow up with treatment team if Dr Chente Kaye feels patient could be monitored safely in a [facility] such as [address] [place] [address] where she was residing prior to admission  CM will continue to follow up

## 2019-09-18 RX ORDER — CLOZAPINE 25 MG/1
50 TABLET ORAL 2 TIMES DAILY
Status: DISCONTINUED | OUTPATIENT
Start: 2019-09-18 | End: 2020-01-01 | Stop reason: HOSPADM

## 2019-09-18 RX ORDER — FLUOXETINE HYDROCHLORIDE 20 MG/1
20 CAPSULE ORAL DAILY
Status: DISCONTINUED | OUTPATIENT
Start: 2019-09-19 | End: 2019-10-24

## 2019-09-18 RX ADMIN — TIOTROPIUM BROMIDE 18 MCG: 18 CAPSULE ORAL; RESPIRATORY (INHALATION) at 08:20

## 2019-09-18 RX ADMIN — CLOZAPINE 50 MG: 25 TABLET ORAL at 21:33

## 2019-09-18 RX ADMIN — PANTOPRAZOLE SODIUM 40 MG: 40 TABLET, DELAYED RELEASE ORAL at 05:37

## 2019-09-18 RX ADMIN — FLUTICASONE FUROATE AND VILANTEROL TRIFENATATE 1 PUFF: 200; 25 POWDER RESPIRATORY (INHALATION) at 08:20

## 2019-09-18 RX ADMIN — CLOZAPINE 50 MG: 25 TABLET ORAL at 16:58

## 2019-09-18 RX ADMIN — THEOPHYLLINE ANHYDROUS 200 MG: 200 CAPSULE, EXTENDED RELEASE ORAL at 08:20

## 2019-09-18 RX ADMIN — FLUOXETINE 10 MG: 10 CAPSULE ORAL at 08:20

## 2019-09-18 RX ADMIN — LEVOTHYROXINE SODIUM 125 MCG: 125 TABLET ORAL at 05:37

## 2019-09-18 RX ADMIN — CLOZAPINE 50 MG: 25 TABLET ORAL at 08:21

## 2019-09-18 RX ADMIN — MONTELUKAST SODIUM 10 MG: 10 TABLET, COATED ORAL at 21:33

## 2019-09-18 NOTE — PLAN OF CARE
Problem: PAIN - ADULT  Goal: Verbalizes/displays adequate comfort level or baseline comfort level  Description  Interventions:  - Encourage patient to monitor pain and request assistance  - Assess pain using appropriate pain scale  - Administer analgesics based on type and severity of pain and evaluate response  - Implement non-pharmacological measures as appropriate and evaluate response  - Consider cultural and social influences on pain and pain management  - Notify physician/advanced practitioner if interventions unsuccessful or patient reports new pain  Outcome: Progressing     Problem: SAFETY ADULT  Goal: Patient will remain free of falls  Description  INTERVENTIONS:  - Assess patient frequently for physical needs  -  Identify cognitive and physical deficits and behaviors that affect risk of falls    -  [place] fall precautions as indicated by assessment   - Educate patient/family on patient safety including physical limitations  - Instruct patient to call for assistance with activity based on assessment  - Modify environment to reduce risk of injury  - Consider OT/PT consult to assist with strengthening/mobility  Outcome: Progressing     Problem: RESPIRATORY - ADULT  Goal: Achieves optimal ventilation and oxygenation  Description  INTERVENTIONS:  - Assess for changes in respiratory status  - Assess for changes in mentation and behavior  - Position to facilitate oxygenation and minimize respiratory effort  - Oxygen administration by appropriate delivery method based on oxygen saturation (per order) or ABGs  - Initiate smoking cessation education as indicated  - Encourage broncho-pulmonary hygiene including cough, deep breathe, Incentive Spirometry  - Assess the need for suctioning and aspirate as needed  - Assess and instruct to report SOB or any respiratory difficulty  - Respiratory Therapy support as indicated  Outcome: Corrie Tolliver maintained on fall and SAFE precaution without incident on this shift, thus far  Laying in bed with 1L of O2 with humidifier via NC  Q 15 minutes checks during rounds continues  No behavioral noted  No SOB notes  Will continue to monitor

## 2019-09-18 NOTE — PLAN OF CARE
Problem: Alteration in Thoughts and Perception  Goal: Treatment Goal: Gain control of psychotic behaviors/thinking, reduce/eliminate presenting symptoms and demonstrate improved reality functioning upon discharge  Outcome: Progressing  Goal: Verbalize thoughts and feelings  Description  Interventions:  - Promote a nonjudgmental and trusting relationship with the patient through active listening and therapeutic communication  - Assess patient's level of functioning, behavior and potential for risk  - Engage patient in 1 on 1 interactions for a minimum of 15 minutes each session  - Encourage patient to express fears, feelings, frustrations, and discuss symptoms    - [place] patient to reality, help patient recognize reality-based thinking   - Administer medications as ordered and assess for potential side effects  - Provide the patient education related to the signs and symptoms of the illness and desired effects of prescribed medications  Outcome: Progressing  Goal: Agree to be compliant with medication regime, as prescribed and report medication side effects  Description  Interventions:  - Offer appropriate PRN medication and supervise ingestion; conduct aims, as needed   Outcome: Progressing  Goal: Attend and participate in unit activities, including therapeutic, recreational, and educational groups  Description  Interventions:  - Provide therapeutic and educational activities daily, encourage attendance and participation, and document same in the medical record     CERTIFIED PEER SPECIALIST INTERVENTIONS:    Complete peer assessment with patient to assess their needs and identify their goals to complete while in the recovery program as well as once discharged into the community  Patient will complete WRAP Plan, Crisis Plan and 5 Life Domains  Patient will attend 50% of groups offered on the unit  Patient will complete a goal card weekly      Outcome: Progressing  Goal: Recognize dysfunctional thoughts, communicate reality-based thoughts at the time of discharge  Description  Interventions:  - Provide medication and psycho-education to assist patient in compliance and developing insight into his/her illness   Outcome: Progressing  Goal: Complete daily ADLs, including personal hygiene independently, as able  Description  Interventions:  - Observe, teach, and assist patient with ADLS  - Monitor and promote a balance of rest/activity, with adequate nutrition and elimination   Outcome: Progressing     Problem: Ineffective Coping  Goal: Identifies ineffective coping skills  Outcome: Progressing  Goal: Identifies healthy coping skills  Outcome: Progressing  Goal: Demonstrates healthy coping skills  Outcome: Progressing  Goal: Participates in unit activities  Description  Interventions:  - Provide therapeutic environment   - Provide required programming   - Redirect inappropriate behaviors   Outcome: Progressing  Goal: Patient/Family participate in treatment and DC plans  Description  Interventions:  - Provide therapeutic environment  Outcome: Progressing  Goal: Patient/Family verbalizes awareness of resources  Outcome: Progressing  Goal: Understands least restrictive measures  Description  Interventions:  - Utilize least restrictive behavior  Outcome: Progressing     Problem: Risk for Self Injury/Neglect  Goal: Treatment Goal: Remain safe during length of stay, learn and adopt new coping skills, and be free of self-injurious ideation, impulses and acts at the time of discharge  Outcome: Progressing  Goal: Verbalize thoughts and feelings  Description  Interventions:  - Assess and re-assess patient's lethality and potential for self-injury  - Engage patient in 1:1 interactions, daily, for a minimum of 15 minutes  - Encourage patient to express feelings, fears, frustrations, hopes  - Establish rapport/trust with patient   Outcome: Progressing  Goal: Refrain from harming self  Description  Interventions:  - Monitor patient closely, per order  - Develop a trusting relationship  - Supervise medication ingestion, monitor effects and side effects   Outcome: Progressing  Goal: Attend and participate in unit activities, including therapeutic, recreational, and educational groups  Description  Interventions:  - Provide therapeutic and educational activities daily, encourage attendance and participation, and document same in the medical record  - Obtain collateral information, encourage visitation and family involvement in care   Outcome: Progressing  Goal: Recognize maladaptive responses and adopt new coping mechanisms  Outcome: Progressing  Goal: Complete daily ADLs, including personal hygiene independently, as able  Description  Interventions:  - Observe, teach, and assist patient with ADLS  - Monitor and promote a balance of rest/activity, with adequate nutrition and elimination  Outcome: Progressing     Problem: Depression  Goal: Treatment Goal: Demonstrate behavioral control of depressive symptoms, verbalize feelings of improved mood/affect, and adopt new coping skills prior to discharge  Outcome: Progressing  Goal: Verbalize thoughts and feelings  Description  Interventions:  - Assess and re-assess patient's level of risk   - Engage patient in 1:1 interactions, daily, for a minimum of 15 minutes   - Encourage patient to express feelings, fears, frustrations, hopes   Outcome: Progressing  Goal: Refrain from isolation  Description  Interventions:  - Develop a trusting relationship   - Encourage socialization   Outcome: Progressing  Goal: Refrain from self-neglect  Outcome: Progressing     Problem: Anxiety  Goal: Anxiety is at manageable level  Description  Interventions:  - Assess and monitor patient's anxiety level  - Monitor for signs and symptoms of anxiety both physical and emotional (heart palpitations, chest pain, shortness of breath, headaches, nausea, feeling jumpy, restlessness, irritable, apprehensive)     - Collaborate with interdisciplinary team and initiate plan and interventions as ordered  - [place] patient to unit/surroundings  - Explain treatment plan  - Encourage participation in care  - Encourage verbalization of concerns/fears  - Identify coping mechanisms  - Assist in developing anxiety-reducing skills  - Administer/offer alternative therapies  - Limit or eliminate stimulants  Outcome: Progressing     Problem: Alteration in Orientation  Goal: Interact with staff daily  Description  Interventions:  - Assess and re-assess patient's level of orientation  - Engage patient in 1 on 1 interactions, daily, for a minimum of 15 minutes   - Establish rapport/trust with patient   Outcome: Progressing  Goal: Cooperate with recommended testing/procedures  Description  Interventions:  - Determine need for ancillary testing  - Observe for mental status changes  - Implement falls/precaution protocol   Outcome: Progressing     Problem: PAIN - ADULT  Goal: Verbalizes/displays adequate comfort level or baseline comfort level  Description  Interventions:  - Encourage patient to monitor pain and request assistance  - Assess pain using appropriate pain scale  - Administer analgesics based on type and severity of pain and evaluate response  - Implement non-pharmacological measures as appropriate and evaluate response  - Consider cultural and social influences on pain and pain management  - Notify physician/advanced practitioner if interventions unsuccessful or patient reports new pain  Outcome: Progressing     Problem: SAFETY ADULT  Goal: Patient will remain free of falls  Description  INTERVENTIONS:  - Assess patient frequently for physical needs  -  Identify cognitive and physical deficits and behaviors that affect risk of falls    -  [place] fall precautions as indicated by assessment   - Educate patient/family on patient safety including physical limitations  - Instruct patient to call for assistance with activity based on assessment  - Modify environment to reduce risk of injury  - Consider OT/PT consult to assist with strengthening/mobility  Outcome: Progressing     Problem: RESPIRATORY - ADULT  Goal: Achieves optimal ventilation and oxygenation  Description  INTERVENTIONS:  - Assess for changes in respiratory status  - Assess for changes in mentation and behavior  - Position to facilitate oxygenation and minimize respiratory effort  - Oxygen administration by appropriate delivery method based on oxygen saturation (per order) or ABGs  - Initiate smoking cessation education as indicated  - Encourage broncho-pulmonary hygiene including cough, deep breathe, Incentive Spirometry  - Assess the need for suctioning and aspirate as needed  - Assess and instruct to report SOB or any respiratory difficulty  - Respiratory Therapy support as indicated  Outcome: Progressing     Problem: DISCHARGE PLANNING  Goal: Discharge to home or other facility with appropriate resources  Description  INTERVENTIONS:  - Conduct assessment to determine patient/family and health care team treatment goals, and need for post-acute services based on payer coverage, community resources, and patient preferences, and barriers to discharge  - Address psychosocial, clinical, and financial barriers to discharge as identified in assessment in conjunction with the patient/family and health care team  - Assist the patient in reintegration back into the community by removing barriers which may hinder a successful discharge once deemed stable  - Arrange appropriate level of post-acute services according to patient's needs and preference and payer coverage in collaboration with the physician and health care team  - Communicate with and update the patient/family, physician, and health care team regarding progress on the discharge plan  - Arrange appropriate transportation to post-acute venues    Outcome: Progressing       Problem: Alteration in Thoughts and Perception  Goal: Treatment Goal: Gain control of psychotic behaviors/thinking, reduce/eliminate presenting symptoms and demonstrate improved reality functioning upon discharge  Outcome: Progressing  Goal: Verbalize thoughts and feelings  Description  Interventions:  - Promote a nonjudgmental and trusting relationship with the patient through active listening and therapeutic communication  - Assess patient's level of functioning, behavior and potential for risk  - Engage patient in 1 on 1 interactions for a minimum of 15 minutes each session  - Encourage patient to express fears, feelings, frustrations, and discuss symptoms    - [place] patient to reality, help patient recognize reality-based thinking   - Administer medications as ordered and assess for potential side effects  - Provide the patient education related to the signs and symptoms of the illness and desired effects of prescribed medications  Outcome: Progressing  Goal: Agree to be compliant with medication regime, as prescribed and report medication side effects  Description  Interventions:  - Offer appropriate PRN medication and supervise ingestion; conduct aims, as needed   Outcome: Progressing  Goal: Attend and participate in unit activities, including therapeutic, recreational, and educational groups  Description  Interventions:  - Provide therapeutic and educational activities daily, encourage attendance and participation, and document same in the medical record     CERTIFIED PEER SPECIALIST INTERVENTIONS:    Complete peer assessment with patient to assess their needs and identify their goals to complete while in the recovery program as well as once discharged into the community  Patient will complete WRAP Plan, Crisis Plan and 5 Life Domains  Patient will attend 50% of groups offered on the unit  Patient will complete a goal card weekly      Outcome: Progressing  Goal: Recognize dysfunctional thoughts, communicate reality-based thoughts at the time of discharge  Description  Interventions:  - Provide medication and psycho-education to assist patient in compliance and developing insight into his/her illness   Outcome: Progressing  Goal: Complete daily ADLs, including personal hygiene independently, as able  Description  Interventions:  - Observe, teach, and assist patient with ADLS  - Monitor and promote a balance of rest/activity, with adequate nutrition and elimination   Outcome: Progressing     Problem: Ineffective Coping  Goal: Identifies ineffective coping skills  Outcome: Progressing  Goal: Identifies healthy coping skills  Outcome: Progressing  Goal: Demonstrates healthy coping skills  Outcome: Progressing  Goal: Participates in unit activities  Description  Interventions:  - Provide therapeutic environment   - Provide required programming   - Redirect inappropriate behaviors   Outcome: Progressing  Goal: Patient/Family participate in treatment and DC plans  Description  Interventions:  - Provide therapeutic environment  Outcome: Progressing  Goal: Patient/Family verbalizes awareness of resources  Outcome: Progressing  Goal: Understands least restrictive measures  Description  Interventions:  - Utilize least restrictive behavior  Outcome: Progressing     Problem: Risk for Self Injury/Neglect  Goal: Treatment Goal: Remain safe during length of stay, learn and adopt new coping skills, and be free of self-injurious ideation, impulses and acts at the time of discharge  Outcome: Progressing  Goal: Verbalize thoughts and feelings  Description  Interventions:  - Assess and re-assess patient's lethality and potential for self-injury  - Engage patient in 1:1 interactions, daily, for a minimum of 15 minutes  - Encourage patient to express feelings, fears, frustrations, hopes  - Establish rapport/trust with patient   Outcome: Progressing  Goal: Refrain from harming self  Description  Interventions:  - Monitor patient closely, per order  - Develop a trusting relationship  - Supervise medication ingestion, monitor effects and side effects   Outcome: Progressing  Goal: Attend and participate in unit activities, including therapeutic, recreational, and educational groups  Description  Interventions:  - Provide therapeutic and educational activities daily, encourage attendance and participation, and document same in the medical record  - Obtain collateral information, encourage visitation and family involvement in care   Outcome: Progressing  Goal: Recognize maladaptive responses and adopt new coping mechanisms  Outcome: Progressing  Goal: Complete daily ADLs, including personal hygiene independently, as able  Description  Interventions:  - Observe, teach, and assist patient with ADLS  - Monitor and promote a balance of rest/activity, with adequate nutrition and elimination  Outcome: Progressing     Problem: Depression  Goal: Treatment Goal: Demonstrate behavioral control of depressive symptoms, verbalize feelings of improved mood/affect, and adopt new coping skills prior to discharge  Outcome: Progressing  Goal: Verbalize thoughts and feelings  Description  Interventions:  - Assess and re-assess patient's level of risk   - Engage patient in 1:1 interactions, daily, for a minimum of 15 minutes   - Encourage patient to express feelings, fears, frustrations, hopes   Outcome: Progressing  Goal: Refrain from isolation  Description  Interventions:  - Develop a trusting relationship   - Encourage socialization   Outcome: Progressing  Goal: Refrain from self-neglect  Outcome: Progressing     Problem: Anxiety  Goal: Anxiety is at manageable level  Description  Interventions:  - Assess and monitor patient's anxiety level  - Monitor for signs and symptoms of anxiety both physical and emotional (heart palpitations, chest pain, shortness of breath, headaches, nausea, feeling jumpy, restlessness, irritable, apprehensive)     - Collaborate with interdisciplinary team and initiate plan and interventions as ordered  - [place] patient to unit/surroundings  - Explain treatment plan  - Encourage participation in care  - Encourage verbalization of concerns/fears  - Identify coping mechanisms  - Assist in developing anxiety-reducing skills  - Administer/offer alternative therapies  - Limit or eliminate stimulants  Outcome: Progressing     Problem: Alteration in Orientation  Goal: Interact with staff daily  Description  Interventions:  - Assess and re-assess patient's level of orientation  - Engage patient in 1 on 1 interactions, daily, for a minimum of 15 minutes   - Establish rapport/trust with patient   Outcome: Progressing  Goal: Cooperate with recommended testing/procedures  Description  Interventions:  - Determine need for ancillary testing  - Observe for mental status changes  - Implement falls/precaution protocol   Outcome: Progressing     Problem: PAIN - ADULT  Goal: Verbalizes/displays adequate comfort level or baseline comfort level  Description  Interventions:  - Encourage patient to monitor pain and request assistance  - Assess pain using appropriate pain scale  - Administer analgesics based on type and severity of pain and evaluate response  - Implement non-pharmacological measures as appropriate and evaluate response  - Consider cultural and social influences on pain and pain management  - Notify physician/advanced practitioner if interventions unsuccessful or patient reports new pain  Outcome: Progressing     Problem: SAFETY ADULT  Goal: Patient will remain free of falls  Description  INTERVENTIONS:  - Assess patient frequently for physical needs  -  Identify cognitive and physical deficits and behaviors that affect risk of falls    -  [place] fall precautions as indicated by assessment   - Educate patient/family on patient safety including physical limitations  - Instruct patient to call for assistance with activity based on assessment  - Modify environment to reduce risk of injury  - Consider OT/PT consult to assist with strengthening/mobility  Outcome: Progressing     Problem: RESPIRATORY - ADULT  Goal: Achieves optimal ventilation and oxygenation  Description  INTERVENTIONS:  - Assess for changes in respiratory status  - Assess for changes in mentation and behavior  - Position to facilitate oxygenation and minimize respiratory effort  - Oxygen administration by appropriate delivery method based on oxygen saturation (per order) or ABGs  - Initiate smoking cessation education as indicated  - Encourage broncho-pulmonary hygiene including cough, deep breathe, Incentive Spirometry  - Assess the need for suctioning and aspirate as needed  - Assess and instruct to report SOB or any respiratory difficulty  - Respiratory Therapy support as indicated  Outcome: Progressing     Problem: DISCHARGE PLANNING  Goal: Discharge to home or other facility with appropriate resources  Description  INTERVENTIONS:  - Conduct assessment to determine patient/family and health care team treatment goals, and need for post-acute services based on payer coverage, community resources, and patient preferences, and barriers to discharge  - Address psychosocial, clinical, and financial barriers to discharge as identified in assessment in conjunction with the patient/family and health care team  - Assist the patient in reintegration back into the community by removing barriers which may hinder a successful discharge once deemed stable  - Arrange appropriate level of post-acute services according to patient's needs and preference and payer coverage in collaboration with the physician and health care team  - Communicate with and update the patient/family, physician, and health care team regarding progress on the discharge plan  - Arrange appropriate transportation to post-acute venues    Outcome: Progressing    9/18/19 Shift 7-3  BM-9/18  Shower 9/18   Groups: 0  Meals:  50% breakfast, 100% lunch   Patient showered, had hair washed, dried and styled with assist of 2   SP02 was 98% on room air  No c/o s/s pain, discomfort or distress  Compliant with meds and meals  No c/o s/s depression, anxiety, SI or HI

## 2019-09-18 NOTE — PROGRESS NOTES
09/18/19 0800   Team Meeting   Meeting Type Daily Rounds   Team Members Present   Team Members Present Physician;Nurse;; Other (Discipline and Name)   Physician Team Member Dr Francis Franco Team Member Lu Young RN   Care Management Team Member Brenda Hemphill   Other (Discipline and Name) DIANA Link     Patient was in bed for most of the day yesterday  Patient was not acknowledging staff trying to talk to her and had very poor eye contact  Focused on somatic complaints  No motivation or energy  Didn't attend any groups  Last shower 09/12  Slept

## 2019-09-18 NOTE — ASSESSMENT & PLAN NOTE
Psychiatry Progress Note    Subjective: Interval History     Patient has not taken any showers since 9/12/19 and claims that she is planning to take a shower today  She was refusing to get out of bed and laying on bed all day yesterday claiming that she feels tired and I did remind her that she needs to attend groups to be able to get high privileges  She finally agreed to get off the clozapine in the morning to see if that would make a difference and lower it to 50 mg twice a day instead and increase the Prozac as 20 mg in the morning as she still complains about feeling depressed  Staff has been reminding her daily to take showers get out of bed and attend groups  She continues to keep her hair uncombed and prefers to lay back on her bed  She continues to verbalize that she does not feel quite safe on the unit becoming psychosomatic  She continues to tend to be suspicious about certain staff who gives her medications like her inhalers and the spirometer and again complains of not breathing properly despite breathing appropriately and having nasal oxygen on at night with acceptable pulse ox when checked by staff  She claims to feel sad or being in the hospital this long but does not report any crying spells or suicidal thoughts at all  She continues to present with stilted speech being too formal as if talking in a court of law which has not changed either  She does not admit to any overt hallucinations or paranoia but still appears to harbor some covert  paranoia based on her demeanor and interaction continues to that there is a 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 implanted when she had surgery years back for her own protection   She has chosen not to to use the portable oxygen to get out of bed and go to groups if necessary  She remains guarded suspicious evasive and in denial of her illness on an ongoing basis    No current suicidal homicidal thoughts intent or plans reported  No overt hallucinations or other delusions reported   No signs or sxs of agranulocytosis or myocarditis or endocarditis and she is moving her bowels so far with no issues    She is still passive-aggressive and refuses to get out of bed or take showers or attend most of the groups   Current medications:    Current Facility-Administered Medications:     acetaminophen (TYLENOL) tablet 650 mg, 650 mg, Oral, Q6H PRN, Alirio Frazier MD    albuterol (PROVENTIL HFA,VENTOLIN HFA) inhaler 2 puff, 2 puff, Inhalation, Q4H PRN, Alirio Frazier MD, 2 puff at 07/25/19 1200    aluminum-magnesium hydroxide-simethicone (MYLANTA) 200-200-20 mg/5 mL oral suspension 15 mL, 15 mL, Oral, Q4H PRN, Alirio Frazier MD    ammonium lactate (LAC-HYDRIN) 12 % lotion 1 application, 1 application, Topical, BID PRN, Alirio Frazier MD    benzonatate (TESSALON PERLES) capsule 100 mg, 100 mg, Oral, TID PRN, Alirio Frazier MD    benztropine (COGENTIN) injection 1 mg, 1 mg, Intramuscular, Q8H PRN, Alirio Frazier MD    cloZAPine (CLOZARIL) tablet 50 mg, 50 mg, Oral, BID, Alirio Frazier MD    EPINEPHrine PF (ADRENALIN) 1 mg/mL injection 0 15 mg, 0 15 mg, Intramuscular, Once PRN, MD Navin Leys  [START ON 9/19/2019] FLUoxetine (PROzac) capsule 20 mg, 20 mg, Oral, Daily, Alirio Frazier MD    fluticasone-vilanterol (BREO ELLIPTA) 200-25 MCG/INH inhaler 1 puff, 1 puff, Inhalation, Daily, Alirio Frazier MD, 1 puff at 09/18/19 0820    ketotifen (ZADITOR) 0 025 % ophthalmic solution 1 drop, 1 drop, Right Eye, BID PRN, Alirio Frazier MD    levothyroxine tablet 125 mcg, 125 mcg, Oral, Early Morning, Alirio Frazier MD, 125 mcg at 09/18/19 0537    magnesium hydroxide (MILK OF MAGNESIA) 400 mg/5 mL oral suspension 30 mL, 30 mL, Oral, Daily PRN, Alirio Frazier MD    montelukast (SINGULAIR) tablet 10 mg, 10 mg, Oral, HS, Alirio Frazier MD, 10 mg at 09/17/19 2137    OLANZapine (ZyPREXA) IM injection 5 mg, 5 mg, Intramuscular, Q8H PRN, MD Navin Le OLANZapine (ZyPREXA) tablet 5 mg, 5 mg, Oral, Q8H PRN, Faheem Daniels MD    pantoprazole (PROTONIX) EC tablet 40 mg, 40 mg, Oral, Early Morning, Faheem Daniels MD, 40 mg at 09/18/19 0537    polyethylene glycol (MIRALAX) packet 17 g, 17 g, Oral, Daily PRN, Faheem Daniels MD    polyvinyl alcohol (LIQUIFILM TEARS) 1 4 % ophthalmic solution 1 drop, 1 drop, Both Eyes, Q3H PRN, Faheem Daniels MD    theophylline (JEF-24) 24 hr capsule 200 mg, 200 mg, Oral, Daily, Faheem Daniels MD, 200 mg at 09/18/19 0820    tiotropium (SPIRIVA) capsule for inhaler 18 mcg, 18 mcg, Inhalation, Daily, Faheem Daniels MD, 18 mcg at 09/18/19 0820    traZODone (DESYREL) tablet 25 mg, 25 mg, Oral, HS PRN, Faheem Daniels MD  Justification if on more than two antipsychotics:  Only on his clozapine  Side effects if any:  None    Risks , benefits, side effects and precautions of medications discussed with patient and reminded patient to let us know any problems with medications     Objective:     Vital Signs:  Vitals:    09/17/19 1607 09/17/19 1939 09/18/19 0700 09/18/19 0705   BP: 103/58 117/57 119/70 103/63   BP Location: Left arm Left arm Left arm Left arm   Pulse: 85 94 93 (!) 107   Resp: 16 16 18 18   Temp: (!) 97 1 °F (36 2 °C) (!) 97 2 °F (36 2 °C) 97 6 °F (36 4 °C)    TempSrc: Temporal Temporal Temporal    SpO2: 92% 92%     Weight:       Height:         Appearance:  age appropriate, casually dressed and overweight older than stated age   Behavior:  deviant, evasive and guarded and suspicious but with good eye contact   Speech:  normal pitch and normal volume but tends to become loud at times   Mood:  anxious and dysthymic   Affect:  mood-congruent   Thought Process:  goal directed and illogical slightly pressured but able to be redirected and talks as if in a court of low being stilted   Thought Content:  delusions  grandiose and somatic about not being able to breathe despite being on nasal oxygen and paranoid about people out to harm her and Atrovent inhaler tainteded with peanut oil  No current suicidal homicidal thoughts intent or plans reported  No phobias obsessions or compulsions reported   Perceptual Disturbances: None and does not appear responding to internal stimuli   Risk Potential: Tendency to not care for herself if she goes off treatment   Sensorium:  person, place, time/date, situation, day of week, month of year and time   Cognition:  grossly intact   Consciousness:  alert and awake    Attention: Intact concentration and attention span   Intellect: Considered to be at least of average intelligence   Insight:  limited in denial   Judgment: poor      Motor Activity: no abnormal movements         Recent Labs:  Results Reviewed     None          I/O Past 24 hours:  No intake/output data recorded  No intake/output data recorded  Assessment / Plan:     Schizoaffective disorder, [facility] ([facility] )      Reason for continued inpatient care:  Because of underlying paranoia and refusal to go back to the personal care home and inability to care for herself on her own  Acceptance by patient:  Accepting  Antonia Palafox in recovery:  About living in another personal care home once she feels better  Understanding of medications :  Has some understanding  Involved in reintegration process:  Adjusting to the unit  Trusting in relatoinship with psychiatrist:  Trusting    Recommended Treatment:    Medication changes:  1) decrease clozapine as 50 mg twice a day and increase Prozac as 20 mg in the morning  Non-pharmacological treatments  1) continue with  individual therapy group therapy, milieu therapy and occupational therapy and milieu therapy involving multidisciplinary team approach with psychotherapist, case management, nursing, peer support services, art therapist etc using recovery principles and psycho-education about accepting illness and need for treatment     3) encourage to attend to ADLs like taking showers and wearing clean clothes   4) Encourage to attend groups   Safety  1) Safety/communication plan established targeting dynamic risk factors above  Discharge Plan most likely back to the a:  Personal care boarding home with act services once her delusions are managed and mood becomes more stabilized and she becomes more receptive to treatment compliance    Counseling / Coordination of Care    Total floor / unit time spent today 15 minutes  Greater than 50% of total time was spent with the patient and / or family counseling and / or coordination of care  A description of the counseling / coordination of care  Patient's Rights, confidentiality and exceptions to confidentiality, use of automated medical record, [facility] staff access to medical record, and consent to treatment reviewed      Ivonne Nevarez MD

## 2019-09-18 NOTE — PROGRESS NOTES
Progress Note - Sen Garvey 1955, 60 y o  female MRN: [id number]    Unit/Bed#: [facility] Encounter: [id number]    Primary Care Provider: Loida Millard MD   Date and time admitted to hospital: 7/23/2019  5:30 PM        * Schizoaffective disorder, bipolar type [facility])  Assessment & Plan  Psychiatry Progress Note    Subjective: Interval History     Patient has not taken any showers since 9/12/19 and claims that she is planning to take a shower today  She was refusing to get out of bed and laying on bed all day yesterday claiming that she feels tired and I did remind her that she needs to attend groups to be able to get high privileges  She finally agreed to get off the clozapine in the morning to see if that would make a difference and lower it to 50 mg twice a day instead and increase the Prozac as 20 mg in the morning as she still complains about feeling depressed  Staff has been reminding her daily to take showers get out of bed and attend groups  She continues to keep her hair uncombed and prefers to lay back on her bed  She continues to verbalize that she does not feel quite safe on the unit becoming psychosomatic  She continues to tend to be suspicious about certain staff who gives her medications like her inhalers and the spirometer and again complains of not breathing properly despite breathing appropriately and having nasal oxygen on at night with acceptable pulse ox when checked by staff  She claims to feel sad or being in the hospital this long but does not report any crying spells or suicidal thoughts at all  She continues to present with stilted speech being too formal as if talking in a court of law which has not changed either    She does not admit to any overt hallucinations or paranoia but still appears to harbor some covert  paranoia based on her demeanor and interaction continues to that there is a 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 implanted when she had surgery years back for her own protection   She has chosen not to to use the portable oxygen to get out of bed and go to groups if necessary  She remains guarded suspicious evasive and in denial of her illness on an ongoing basis  No current suicidal homicidal thoughts intent or plans reported  No overt hallucinations or other delusions reported   No signs or sxs of agranulocytosis or myocarditis or endocarditis and she is moving her bowels so far with no issues    She is still passive-aggressive and refuses to get out of bed or take showers or attend most of the groups   Current medications:    Current Facility-Administered Medications:     acetaminophen (TYLENOL) tablet 650 mg, 650 mg, Oral, Q6H PRN, Shi Pham MD    albuterol (PROVENTIL HFA,VENTOLIN HFA) inhaler 2 puff, 2 puff, Inhalation, Q4H PRN, Shi Pham MD, 2 puff at 07/25/19 1200    aluminum-magnesium hydroxide-simethicone (MYLANTA) 200-200-20 mg/5 mL oral suspension 15 mL, 15 mL, Oral, Q4H PRN, Shi Pham MD    ammonium lactate (LAC-HYDRIN) 12 % lotion 1 application, 1 application, Topical, BID PRN, Shi Pham MD    benzonatate (TESSALON PERLES) capsule 100 mg, 100 mg, Oral, TID PRN, Shi Pham MD    benztropine (COGENTIN) injection 1 mg, 1 mg, Intramuscular, Q8H PRN, Shi Pham MD    cloZAPine (CLOZARIL) tablet 50 mg, 50 mg, Oral, BID, Shi Pham MD    EPINEPHrine PF (ADRENALIN) 1 mg/mL injection 0 15 mg, 0 15 mg, Intramuscular, Once PRN, Shi Pham MD  [facility]  [START ON 9/19/2019] FLUoxetine (PROzac) capsule 20 mg, 20 mg, Oral, Daily, Shi Pham MD    fluticasone-vilanterol (BREO ELLIPTA) 200-25 MCG/INH inhaler 1 puff, 1 puff, Inhalation, Daily, Shi Pham MD, 1 puff at 09/18/19 0820    ketotifen (ZADITOR) 0 025 % ophthalmic solution 1 drop, 1 drop, Right Eye, BID PRN, Shi Pham MD    levothyroxine tablet 125 mcg, 125 mcg, Oral, Early Morning, Shi Pham MD, 125 mcg at 09/18/19 0537   magnesium hydroxide (MILK OF MAGNESIA) 400 mg/5 mL oral suspension 30 mL, 30 mL, Oral, Daily PRN, Meredith Wesley MD    montelukast (SINGULAIR) tablet 10 mg, 10 mg, Oral, HS, Meredith Wesley MD, 10 mg at 09/17/19 2137    OLANZapine (ZyPREXA) IM injection 5 mg, 5 mg, Intramuscular, Q8H PRN, Meredith Wesley MD    OLANZapine (ZyPREXA) tablet 5 mg, 5 mg, Oral, Q8H PRN, Meredith Wesley MD    pantoprazole (PROTONIX) EC tablet 40 mg, 40 mg, Oral, Early Morning, Meredith Wesley MD, 40 mg at 09/18/19 0537    polyethylene glycol (MIRALAX) packet 17 g, 17 g, Oral, Daily PRN, Meredith Wesley MD    polyvinyl alcohol (LIQUIFILM TEARS) 1 4 % ophthalmic solution 1 drop, 1 drop, Both Eyes, Q3H PRN, Meredith Wesley MD    theophylline (JEF-24) 24 hr capsule 200 mg, 200 mg, Oral, Daily, Meredith Wesley MD, 200 mg at 09/18/19 0820    tiotropium (SPIRIVA) capsule for inhaler 18 mcg, 18 mcg, Inhalation, Daily, Meredith Wesley MD, 18 mcg at 09/18/19 0820    traZODone (DESYREL) tablet 25 mg, 25 mg, Oral, HS PRN, Meredith Wesley MD  Justification if on more than two antipsychotics:  Only on his clozapine  Side effects if any:  None    Risks , benefits, side effects and precautions of medications discussed with patient and reminded patient to let us know any problems with medications     Objective:     Vital Signs:  Vitals:    09/17/19 1607 09/17/19 1939 09/18/19 0700 09/18/19 0705   BP: 103/58 117/57 119/70 103/63   BP Location: Left arm Left arm Left arm Left arm   Pulse: 85 94 93 (!) 107   Resp: 16 16 18 18   Temp: (!) 97 1 °F (36 2 °C) (!) 97 2 °F (36 2 °C) 97 6 °F (36 4 °C)    TempSrc: Temporal Temporal Temporal    SpO2: 92% 92%     Weight:       Height:         Appearance:  age appropriate, casually dressed and overweight older than stated age   Behavior:  deviant, evasive and guarded and suspicious but with good eye contact   Speech:  normal pitch and normal volume but tends to become loud at times   Mood:  anxious and dysthymic   Affect:  mood-congruent Thought Process:  goal directed and illogical slightly pressured but able to be redirected and talks as if in a court of low being stilted   Thought Content:  delusions  grandiose and somatic about not being able to breathe despite being on nasal oxygen and paranoid about people out to harm her and Atrovent inhaler tainteded with peanut oil  No current suicidal homicidal thoughts intent or plans reported  No phobias obsessions or compulsions reported   Perceptual Disturbances: None and does not appear responding to internal stimuli   Risk Potential: Tendency to not care for herself if she goes off treatment   Sensorium:  person, place, time/date, situation, day of week, month of year and time   Cognition:  grossly intact   Consciousness:  alert and awake    Attention: Intact concentration and attention span   Intellect: Considered to be at least of average intelligence   Insight:  limited in denial   Judgment: poor      Motor Activity: no abnormal movements         Recent Labs:  Results Reviewed     None          I/O Past 24 hours:  No intake/output data recorded  No intake/output data recorded          Assessment / Plan:     Schizoaffective disorder, bipolar type ([facility] )      Reason for continued inpatient care:  Because of underlying paranoia and refusal to go back to the personal care home and inability to care for herself on her own  Acceptance by patient:  Accepting  Eric Cevallos in recovery:  About living in another personal care home once she feels better  Understanding of medications :  Has some understanding  Involved in reintegration process:  Adjusting to the unit  Trusting in relatoinship with psychiatrist:  Trusting    Recommended Treatment:    Medication changes:  1) decrease clozapine as 50 mg twice a day and increase Prozac as 20 mg in the morning  Non-pharmacological treatments  1) continue with  individual therapy group therapy, milieu therapy and occupational therapy and milieu therapy involving multidisciplinary team approach with psychotherapist, case management, nursing, peer support services, art therapist etc using recovery principles and psycho-education about accepting illness and need for treatment   3) encourage to attend to ADLs like taking showers and wearing clean clothes   4) Encourage to attend groups   Safety  1) Safety/communication plan established targeting dynamic risk factors above  Discharge Plan most likely back to the a:  Personal care boarding home with act services once her delusions are managed and mood becomes more stabilized and she becomes more receptive to treatment compliance    Counseling / Coordination of Care    Total floor / unit time spent today 15 minutes  Greater than 50% of total time was spent with the patient and / or family counseling and / or coordination of care  A description of the counseling / coordination of care  Patient's Rights, confidentiality and exceptions to confidentiality, use of automated medical record, [facility] staff access to medical record, and consent to treatment reviewed      Sandhya Bolaños MD

## 2019-09-19 RX ADMIN — THEOPHYLLINE ANHYDROUS 200 MG: 200 CAPSULE, EXTENDED RELEASE ORAL at 08:59

## 2019-09-19 RX ADMIN — PANTOPRAZOLE SODIUM 40 MG: 40 TABLET, DELAYED RELEASE ORAL at 06:13

## 2019-09-19 RX ADMIN — TIOTROPIUM BROMIDE 18 MCG: 18 CAPSULE ORAL; RESPIRATORY (INHALATION) at 08:59

## 2019-09-19 RX ADMIN — FLUOXETINE 20 MG: 20 CAPSULE ORAL at 08:59

## 2019-09-19 RX ADMIN — LEVOTHYROXINE SODIUM 125 MCG: 125 TABLET ORAL at 06:13

## 2019-09-19 RX ADMIN — CLOZAPINE 50 MG: 25 TABLET ORAL at 17:44

## 2019-09-19 RX ADMIN — FLUTICASONE FUROATE AND VILANTEROL TRIFENATATE 1 PUFF: 200; 25 POWDER RESPIRATORY (INHALATION) at 08:59

## 2019-09-19 RX ADMIN — CLOZAPINE 50 MG: 25 TABLET ORAL at 21:29

## 2019-09-19 RX ADMIN — MONTELUKAST SODIUM 10 MG: 10 TABLET, COATED ORAL at 21:29

## 2019-09-19 NOTE — PROGRESS NOTES
Progress Note - Roselia Woody 1955, 60 y o  female MRN: [id number]    Unit/Bed#: [facility] 986-73 Encounter: [id number]    Primary Care Provider: Christopher Guzman MD   Date and time admitted to hospital: 7/23/2019  5:30 PM        * Schizoaffective disorder, bipolar type [facility])  Assessment & Plan  Psychiatry Progress Note    Subjective: Interval History     Patient did finally take a shower yesterday after almost a week with assistance from 2 staff and she was found sitting in the dining arrieta today when approached wearing clean clothes and having combed her hair  She is still complains about tiredness but I reminded her that I took her off the clozapine in the morning and she should not feel that tired and she has agreed now to start attending more groups on the unit  Staff has been reminding her daily to take showers get out of bed and attend groups  She continues to verbalize that she does not feel quite safe on the unit becoming psychosomatic  She continues to tend to be suspicious about certain staff who gives her medications like her inhalers and the spirometer   She again claims that she is not breathing properly despite breathing appropriately and having nasal oxygen on at night with acceptable pulse ox when checked by staff most of the time  She claims to feel sad but she is in the hospital this long but does not report any crying spells or suicidal thoughts at all  She was told that if she improves her group attendance and other requirements by attending to her ADLs skills and we can always looking to discharging her back to the [facility] home again  She continues to present with stilted speech being too formal as if talking in a court of law which has not changed either  She does not admit to any overt hallucinations or paranoia but still appears to harbor some covert  paranoia based on her demeanor and interaction    She is still talks about 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 a few years back  He claims it is there for her own protection so they can monitor her  She has chosen not to to use the portable oxygen to get out of bed and go to groups if necessary  She remains guarded suspicious evasive and in denial of her illness on an ongoing basis  No current suicidal homicidal thoughts intent or plans reported  No overt hallucinations or other delusions reported   No signs or sxs of agranulocytosis or myocarditis or endocarditis and she is moving her bowels so far with no issues     Current medications:    Current Facility-Administered Medications:     acetaminophen (TYLENOL) tablet 650 mg, 650 mg, Oral, Q6H PRN, Chichi Lockett MD    albuterol (PROVENTIL HFA,VENTOLIN HFA) inhaler 2 puff, 2 puff, Inhalation, Q4H PRN, Chichi Lockett MD, 2 puff at 07/25/19 1200    aluminum-magnesium hydroxide-simethicone (MYLANTA) 200-200-20 mg/5 mL oral suspension 15 mL, 15 mL, Oral, Q4H PRN, Chichi Lockett MD    ammonium lactate (LAC-HYDRIN) 12 % lotion 1 application, 1 application, Topical, BID PRN, Chichi Lockett MD    benzonatate (TESSALON PERLES) capsule 100 mg, 100 mg, Oral, TID PRN, Chichi Lockett MD    benztropine (COGENTIN) injection 1 mg, 1 mg, Intramuscular, Q8H PRN, Chichi Lockett MD    cloZAPine (CLOZARIL) tablet 50 mg, 50 mg, Oral, BID, Chichi Lockett MD, 50 mg at 09/18/19 2133    EPINEPHrine PF (ADRENALIN) 1 mg/mL injection 0 15 mg, 0 15 mg, Intramuscular, Once PRN, Chichi Lockett MD    FLUoxetine (PROzac) capsule 20 mg, 20 mg, Oral, Daily, Chichi Lockett MD, 20 mg at 09/19/19 0859    fluticasone-vilanterol (BREO ELLIPTA) 200-25 MCG/INH inhaler 1 puff, 1 puff, Inhalation, Daily, Chichi Lockett MD, 1 puff at 09/19/19 0859    ketotifen (ZADITOR) 0 025 % ophthalmic solution 1 drop, 1 drop, Right Eye, BID PRN, Chichi Lockett MD    levothyroxine tablet 125 mcg, 125 mcg, Oral, Early Morning, Chichi Lockett MD, 125 mcg at 09/19/19 9904    magnesium hydroxide (MILK OF MAGNESIA) 400 mg/5 mL oral suspension 30 mL, 30 mL, Oral, Daily PRN, Prakash Brewster MD    montelukast (SINGULAIR) tablet 10 mg, 10 mg, Oral, HS, Prakash Brewster MD, 10 mg at 09/18/19 2133    OLANZapine (ZyPREXA) IM injection 5 mg, 5 mg, Intramuscular, Q8H PRN, Prakash Brewster MD    OLANZapine (ZyPREXA) tablet 5 mg, 5 mg, Oral, Q8H PRN, Prakash Brewster MD    pantoprazole (PROTONIX) EC tablet 40 mg, 40 mg, Oral, Early Morning, Prakash Brewster MD, 40 mg at 09/19/19 6585    polyethylene glycol (MIRALAX) packet 17 g, 17 g, Oral, Daily PRN, Prakash Brewster MD    polyvinyl alcohol (LIQUIFILM TEARS) 1 4 % ophthalmic solution 1 drop, 1 drop, Both Eyes, Q3H PRN, Prakash Brewster MD    theophylline (JEF-24) 24 hr capsule 200 mg, 200 mg, Oral, Daily, Prakash Brewster MD, 200 mg at 09/19/19 0859    tiotropium (SPIRIVA) capsule for inhaler 18 mcg, 18 mcg, Inhalation, Daily, Prakash Brewster MD, 18 mcg at 09/19/19 0859    traZODone (DESYREL) tablet 25 mg, 25 mg, Oral, HS PRN, Prakash Brewster MD  Justification if on more than two antipsychotics:  Only on his clozapine  Side effects if any:  None    Risks , benefits, side effects and precautions of medications discussed with patient and reminded patient to let us know any problems with medications     Objective:     Vital Signs:  Vitals:    09/18/19 0705 09/18/19 1500 09/18/19 1900 09/18/19 2130   BP: 103/63 110/68 99/60    BP Location: Left arm Left arm Left arm    Pulse: (!) 107 84 93    Resp: 18 19 16    Temp:  (!) 97 °F (36 1 °C) (!) 97 °F (36 1 °C)    TempSrc:  Temporal Temporal    SpO2:  93% 92% 93%   Weight:       Height:         Appearance:  age appropriate, casually dressed and overweight older than stated age   Behavior:  deviant, evasive and guarded and suspicious but with good eye contact   Speech:  normal pitch and normal volume but tends to become loud at times   Mood:  anxious and dysthymic   Affect:  mood-congruent   Thought Process:  goal directed and illogical slightly pressured but able to be redirected and talks as if in a court of low being stilted   Thought Content:  delusions  grandiose and somatic about not being able to breathe despite being on nasal oxygen and paranoid about people out to harm her and Atrovent inhaler tainteded with peanut oil  No current suicidal homicidal thoughts intent or plans reported  No phobias obsessions or compulsions reported   Perceptual Disturbances: None and does not appear responding to internal stimuli   Risk Potential: Tendency to not care for herself if she goes off treatment   Sensorium:  person, place, time/date, situation, day of week, month of year and time   Cognition:  grossly intact   Consciousness:  alert and awake    Attention: Intact concentration and attention span   Intellect: Considered to be at least of average intelligence   Insight:  limited in denial   Judgment: poor      Motor Activity: no abnormal movements         Recent Labs:  Results Reviewed     None          I/O Past 24 hours:  No intake/output data recorded  No intake/output data recorded          Assessment / Plan:     [facility]ffective [facility]      Reason for continued inpatient care:  Because of underlying paranoia and refusal to go back to the personal care home and inability to care for herself on her own  Acceptance by patient:  Accepting  Willaim File in recovery:  About living in another personal care home once she feels better  Understanding of medications :  Has some understanding  Involved in reintegration process:  Adjusting to the unit  Trusting in relatoinship with psychiatrist:  Trusting    Recommended Treatment:    Medication changes:  1) no changes today  Non-pharmacological treatments  1) continue with  individual therapy group therapy, milieu therapy and occupational therapy and milieu therapy involving multidisciplinary team approach with psychotherapist, case management, nursing, peer support services, art therapist etc using recovery principles and psycho-education about accepting illness and need for treatment   3) encourage to attend to ADLs like taking showers and wearing clean clothes   4) Encourage to attend groups   Safety  1) Safety/communication plan established targeting dynamic risk factors above  Discharge Plan most likely back to the a:  Personal care boarding home with act services once her delusions are managed and mood becomes more stabilized and she becomes more receptive to treatment compliance    Counseling / Coordination of Care    Total floor / unit time spent today 15 minutes  Greater than 50% of total time was spent with the patient and / or family counseling and / or coordination of care  A description of the counseling / coordination of care  Patient's Rights, confidentiality and exceptions to confidentiality, use of automated medical record, [facility] adeline staff access to medical record, and consent to treatment reviewed      Jaz Beasley MD

## 2019-09-19 NOTE — PLAN OF CARE
Problem: Alteration in Thoughts and Perception  Goal: Agree to be compliant with medication regime, as prescribed and report medication side effects  Description  Interventions:  - Offer appropriate PRN medication and supervise ingestion; conduct aims, as needed   Outcome: Progressing     Problem: PAIN - ADULT  Goal: Verbalizes/displays adequate comfort level or baseline comfort level  Description  Interventions:  - Encourage patient to monitor pain and request assistance  - Assess pain using appropriate pain scale  - Administer analgesics based on type and severity of pain and evaluate response  - Implement non-pharmacological measures as appropriate and evaluate response  - Consider cultural and social influences on pain and pain management  - Notify physician/advanced practitioner if interventions unsuccessful or patient reports new pain  Outcome: Progressing     Problem: SAFETY ADULT  Goal: Patient will remain free of falls  Description  INTERVENTIONS:  - Assess patient frequently for physical needs  -  Identify cognitive and physical deficits and behaviors that affect risk of falls    -  [place] fall precautions as indicated by assessment   - Educate patient/family on patient safety including physical limitations  - Instruct patient to call for assistance with activity based on assessment  - Modify environment to reduce risk of injury  - Consider OT/PT consult to assist with strengthening/mobility  Outcome: Progressing     Problem: RESPIRATORY - ADULT  Goal: Achieves optimal ventilation and oxygenation  Description  INTERVENTIONS:  - Assess for changes in respiratory status  - Assess for changes in mentation and behavior  - Position to facilitate oxygenation and minimize respiratory effort  - Oxygen administration by appropriate delivery method based on oxygen saturation (per order) or ABGs  - Initiate smoking cessation education as indicated  - Encourage broncho-pulmonary hygiene including cough, deep breathe, Incentive Spirometry  - Assess the need for suctioning and aspirate as needed  - Assess and instruct to report SOB or any respiratory difficulty  - Respiratory Therapy support as indicated  Outcome: Progressing   The patient slept through the night with no behaviors or issues noted  Oxygen maintained at 1 liter via NC while in bed  No s/s respiratory distress noted  Fall precautions maintained  Med compliant  Continue to monitor

## 2019-09-19 NOTE — PLAN OF CARE
Problem: Alteration in Thoughts and Perception  Goal: Verbalize thoughts and feelings  Description  Interventions:  - Promote a nonjudgmental and trusting relationship with the patient through active listening and therapeutic communication  - Assess patient's level of functioning, behavior and potential for risk  - Engage patient in 1 on 1 interactions for a minimum of 15 minutes each session  - Encourage patient to express fears, feelings, frustrations, and discuss symptoms    - [place] patient to reality, help patient recognize reality-based thinking   - Administer medications as ordered and assess for potential side effects  - Provide the patient education related to the signs and symptoms of the illness and desired effects of prescribed medications  Outcome: Not Progressing  Goal: Attend and participate in unit activities, including therapeutic, recreational, and educational groups  Description  Interventions:  - Provide therapeutic and educational activities daily, encourage attendance and participation, and document same in the medical record     CERTIFIED PEER SPECIALIST INTERVENTIONS:    Complete peer assessment with patient to assess their needs and identify their goals to complete while in the recovery program as well as once discharged into the community  Patient will complete WRAP Plan, Crisis Plan and 5 Life Domains  Patient will attend 50% of groups offered on the unit  Patient will complete a goal card weekly  Outcome: Not Progressing  Goal: Complete daily ADLs, including personal hygiene independently, as able  Description  Interventions:  - Observe, teach, and assist patient with ADLS  - Monitor and promote a balance of rest/activity, with adequate nutrition and elimination   Outcome: Not Progressing    Individual continues to struggles with poor motivation and low energy level  When writer approached she was curled up in her bed, back turned    She did not engage, no eye contact and delayed to converse  She was being directed to spend time out of her room  She indicated she planned to attended IMR, which she did, but RN discussed needing to increase time in milieu  She reminded of behavioral plan  However, she has retreated back to room and other than attending IMR group and meal times she has been in bed  Discussed that she is frequently expressing concern related to physical issues, specifically breathing limitations but declining to listen to direction to be sitting up to promote lung inflation  She does not respond when education is being offered  She has been scheduled for podiatry appointment  Availability of staff made known  Will continue to monitor

## 2019-09-19 NOTE — PROGRESS NOTES
09/19/19 0900   Team Meeting   Meeting Type Daily Rounds   Team Members Present   Team Members Present Physician;Nurse;; Other (Discipline and Name)   Physician Team Member Dr Darra Fothergill, RN   Care Management Team Member Brenda Pham   Other (Discipline and Name) Lindsey Rodriges     Patient showered yesterday  Did not attend groups 7-3 groups but did attend some 3-11 groups  Slept

## 2019-09-19 NOTE — PROGRESS NOTES
09/19/19 1100   Activity/Group Checklist   Group Other (Comment)  (IMR - Healthy Relationships)   Attendance Attended   Attendance Duration (min) 31-45   Interactions Interacted appropriately   Affect/Mood Appropriate   Goals Achieved Identified feelings; Identified triggers; Displayed empathy;Able to listen to others; Able to engage in interactions; Able to manage/cope with feelings; Able to self-disclose; Able to recieve feedback; Able to give feedback to another     Patient was helpful in orienting new patient to group process  She shared a concern about certain staff who she believes are "mean" to her  Therapist helped educate her on nursing role and expectations  Therapist also explained that nursing directives are generally of a medical/urgent nature which is why a quick response from patients is necessary  Therapist emphasized personal responsibility in recovery  Patient's peers also challenged patient that "not everything has to be an argument " Patient participated frequently in group discussion as well

## 2019-09-19 NOTE — PROGRESS NOTES
RN approached individual for her to use incentive spirometer which she complied with  RN again discussed concern with the amount of time she is spending in bed  She offered multiple excuses as to why she doesn't spend time in milieu  She appeared annoyed when it was pointed out to her that she is doing a disservice to both her physical and emotional keara

## 2019-09-19 NOTE — ASSESSMENT & PLAN NOTE
Psychiatry Progress Note    Subjective: Interval History     Patient did finally take a shower yesterday after almost a week with assistance from 2 staff and she was found sitting in the dining [place]rieta today when approached wearing clean clothes and having combed her hair  She is still complains about tiredness but I reminded her that I took her off the clozapine in the morning and she should not feel that tired and she has agreed now to start attending more groups on the unit  Staff has been reminding her daily to take showers get out of bed and attend groups  She continues to verbalize that she does not feel quite safe on the unit becoming psychosomatic  She continues to tend to be suspicious about certain staff who gives her medications like her inhalers and the spirometer   She again claims that she is not breathing properly despite breathing appropriately and having nasal oxygen on at night with acceptable pulse ox when checked by staff most of the time  She claims to feel sad but she is in the hospital this long but does not report any crying spells or suicidal thoughts at all  She was told that if she improves her group attendance and other requirements by attending to her ADLs skills and we can always looking to discharging her back to the [facility] personal [facility] home again  She continues to present with stilted speech being too formal as if talking in a court of law which has not changed either  She does not admit to any overt hallucinations or paranoia but still appears to harbor some covert  paranoia based on her demeanor and interaction  She is still talks about 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 a few years back  He claims it is there for her own protection so they can monitor her  She has chosen not to to use the portable oxygen to get out of bed and go to groups if necessary    She remains guarded suspicious evasive and in denial of her illness on an ongoing basis  No current suicidal homicidal thoughts intent or plans reported  No overt hallucinations or other delusions reported   No signs or sxs of agranulocytosis or myocarditis or endocarditis and she is moving her bowels so far with no issues     Current medications:    Current Facility-Administered Medications:     acetaminophen (TYLENOL) tablet 650 mg, 650 mg, Oral, Q6H PRN, Shi Pham MD    albuterol (PROVENTIL HFA,VENTOLIN HFA) inhaler 2 puff, 2 puff, Inhalation, Q4H PRN, Shi hPam MD, 2 puff at 07/25/19 1200    aluminum-magnesium hydroxide-simethicone (MYLANTA) 200-200-20 mg/5 mL oral suspension 15 mL, 15 mL, Oral, Q4H PRN, Shi Pham MD    ammonium lactate (LAC-HYDRIN) 12 % lotion 1 application, 1 application, Topical, BID PRN, Shi Pham MD    benzonatate (TESSALON PERLES) capsule 100 mg, 100 mg, Oral, TID PRN, Shi Pham MD    benztropine (COGENTIN) injection 1 mg, 1 mg, Intramuscular, Q8H PRN, Shi Pham MD    cloZAPine (CLOZARIL) tablet 50 mg, 50 mg, Oral, BID, Shi Pham MD, 50 mg at 09/18/19 2133    EPINEPHrine PF (ADRENALIN) 1 mg/mL injection 0 15 mg, 0 15 mg, Intramuscular, Once PRN, Shi Pham MD    FLUoxetine (PROzac) capsule 20 mg, 20 mg, Oral, Daily, Shi Pham MD, 20 mg at 09/19/19 0859    fluticasone-vilanterol (BREO ELLIPTA) 200-25 MCG/INH inhaler 1 puff, 1 puff, Inhalation, Daily, Shi Pham MD, 1 puff at 09/19/19 0859    ketotifen (ZADITOR) 0 025 % ophthalmic solution 1 drop, 1 drop, Right Eye, BID PRN, Shi Pham MD    levothyroxine tablet 125 mcg, 125 mcg, Oral, Early Morning, Shi Pham MD, 125 mcg at 09/19/19 8645    magnesium hydroxide (MILK OF MAGNESIA) 400 mg/5 mL oral suspension 30 mL, 30 mL, Oral, Daily PRN, Shi Pham MD    montelukast (SINGULAIR) tablet 10 mg, 10 mg, Oral, HS, Shi Pham MD, 10 mg at 09/18/19 2133    OLANZapine (ZyPREXA) IM injection 5 mg, 5 mg, Intramuscular, Q8H PRN, Sarah Hanna MD    OLANZapine (ZyPREXA) tablet 5 mg, 5 mg, Oral, Q8H PRN, Sarah Hanna MD    pantoprazole (PROTONIX) EC tablet 40 mg, 40 mg, Oral, Early Morning, Sarah Hanna MD, 40 mg at 09/19/19 0767    polyethylene glycol (MIRALAX) packet 17 g, 17 g, Oral, Daily PRN, Sarah Hanna MD    polyvinyl alcohol (LIQUIFILM TEARS) 1 4 % ophthalmic solution 1 drop, 1 drop, Both Eyes, Q3H PRN, Sarah Hanna MD    theophylline (JEF-24) 24 hr capsule 200 mg, 200 mg, Oral, Daily, Sarah Hanna MD, 200 mg at 09/19/19 0859    tiotropium (SPIRIVA) capsule for inhaler 18 mcg, 18 mcg, Inhalation, Daily, Sarah Hanna MD, 18 mcg at 09/19/19 0859    traZODone (DESYREL) tablet 25 mg, 25 mg, Oral, HS PRN, Sarah Hanna MD  Justification if on more than two antipsychotics:  Only on his clozapine  Side effects if any:  None    Risks , benefits, side effects and precautions of medications discussed with patient and reminded patient to let us know any problems with medications     Objective:     Vital Signs:  Vitals:    09/18/19 0705 09/18/19 1500 09/18/19 1900 09/18/19 2130   BP: 103/63 110/68 99/60    BP Location: Left arm Left arm Left arm    Pulse: (!) 107 84 93    Resp: 18 19 16    Temp:  (!) 97 °F (36 1 °C) (!) 97 °F (36 1 °C)    TempSrc:  Temporal Temporal    SpO2:  93% 92% 93%   Weight:       Height:         Appearance:  age appropriate, casually dressed and overweight older than stated age   Behavior:  deviant, evasive and guarded and suspicious but with good eye contact   Speech:  normal pitch and normal volume but tends to become loud at times   Mood:  anxious and dysthymic   Affect:  mood-congruent   Thought Process:  goal directed and illogical slightly pressured but able to be redirected and talks as if in a court of low being stilted   Thought Content:  delusions  grandiose and somatic about not being able to breathe despite being on nasal oxygen and paranoid about people out to harm her and Atrovent inhaler tainteded with peanut oil  No current suicidal homicidal thoughts intent or plans reported  No phobias obsessions or compulsions reported   Perceptual Disturbances: None and does not appear responding to internal stimuli   Risk Potential: Tendency to not care for herself if she goes off treatment   Sensorium:  person, place, time/date, situation, day of week, month of year and time   Cognition:  grossly intact   Consciousness:  alert and awake    Attention: Intact concentration and attention span   Intellect: Considered to be at least of average intelligence   Insight:  limited in denial   Judgment: poor      Motor Activity: no abnormal movements         Recent Labs:  Results Reviewed     None          I/O Past 24 hours:  No intake/output data recorded  No intake/output data recorded  Assessment / Plan:     Schizoaffective disorder, [facility]      Reason for continued inpatient care:  Because of underlying paranoia and refusal to go back to the personal care home and inability to care for herself on her own  Acceptance by patient:  Accepting  Herbie Andrews in recovery:  About living in another personal care home once she feels better  Understanding of medications :  Has some understanding  Involved in reintegration process:  Adjusting to the unit  Trusting in relatoinship with psychiatrist:  Trusting    Recommended Treatment:    Medication changes:  1) no changes today  Non-pharmacological treatments  1) continue with  individual therapy group therapy, milieu therapy and occupational therapy and milieu therapy involving multidisciplinary team approach with psychotherapist, case management, nursing, peer support services, art therapist etc using recovery principles and psycho-education about accepting illness and need for treatment     3) encourage to attend to ADLs like taking showers and wearing clean clothes   4) Encourage to attend groups   Safety  1) Safety/communication plan established targeting dynamic risk factors above  Discharge Plan most likely back to the a:  Personal care boarding home with [facility] services once her delusions are managed and mood becomes more stabilized and she becomes more receptive to treatment compliance    Counseling / Coordination of Care    Total floor / unit time spent today 15 minutes  Greater than 50% of total time was spent with the patient and / or family counseling and / or coordination of care  A description of the counseling / coordination of care  Patient's Rights, confidentiality and exceptions to confidentiality, use of automated medical record, [facility] staff access to medical record, and consent to treatment reviewed      Deedee Muir MD

## 2019-09-19 NOTE — PLAN OF CARE
Problem: Alteration in Thoughts and Perception  Goal: Agree to be compliant with medication regime, as prescribed and report medication side effects  Description  Interventions:  - Offer appropriate PRN medication and supervise ingestion; conduct aims, as needed   9/18/2019 2157 by Jossy Romero RN  Outcome: Progressing  9/18/2019 2157 by Jossy Romero RN  Outcome: Progressing  Goal: Attend and participate in unit activities, including therapeutic, recreational, and educational groups  Description  Interventions:  - Provide therapeutic and educational activities daily, encourage attendance and participation, and document same in the medical record     CERTIFIED PEER SPECIALIST INTERVENTIONS:    Complete peer assessment with patient to assess their needs and identify their goals to complete while in the recovery program as well as once discharged into the community  Patient will complete WRAP Plan, Crisis Plan and 5 Life Domains  Patient will attend 50% of groups offered on the unit  Patient will complete a goal card weekly      9/18/2019 2157 by Jossy Romero RN  Outcome: Progressing  9/18/2019 2157 by Jossy Romero RN  Outcome: Progressing     Problem: RESPIRATORY - ADULT  Goal: Achieves optimal ventilation and oxygenation  Description  INTERVENTIONS:  - Assess for changes in respiratory status  - Assess for changes in mentation and behavior  - Position to facilitate oxygenation and minimize respiratory effort  - Oxygen administration by appropriate delivery method based on oxygen saturation (per order) or ABGs  - Initiate smoking cessation education as indicated  - Encourage broncho-pulmonary hygiene including cough, deep breathe, Incentive Spirometry  - Assess the need for suctioning and aspirate as needed  - Assess and instruct to report SOB or any respiratory difficulty  - Respiratory Therapy support as indicated  Outcome: Progressing     Problem: Depression  Goal: Refrain from isolation  Description  Interventions:  - Develop a trusting relationship   - Encourage socialization   9/18/2019 2157 by Elli Javier RN  Outcome: Not Progressing  9/18/2019 2157 by Elli Javier RN  Outcome: Not Progressing     [address] says she received "the Spa treatment" this morning when 2 staff assisted her to shower, shampoo, groom  Hoping for their availability to help her in the future  "I didn't feel rushed, they took their time "  Pleased w/being clean & groomed  Pleasant  Verbal about 35yr old son being "deployed" in near future; expects a visit from him soon w/details  Talks w/pride of his [address] in "Intelligence"  She ate 100% of meal, came up on own for scheduled medicines, had HS snack  Also took part in PM Group  But, is isolative to room & bed in free time  Used the [employer] reaching consistent volumes of 900-1000ml  Wearing the QHS humidified nasal O2 @ 1L for bedtime

## 2019-09-20 LAB
BASOPHILS # BLD AUTO: 0.07 THOUSAND/UL (ref 0–0.1)
BASOPHILS NFR MAR MANUAL: 1 % (ref 0–1)
ERYTHROCYTE [DISTWIDTH] IN BLOOD BY AUTOMATED COUNT: 13.9 %
GLUCOSE SERPL-MCNC: 135 MG/DL (ref 65–140)
HCT VFR BLD AUTO: 44.5 % (ref 36–46)
HGB BLD-MCNC: 14.5 G/DL (ref 12–16)
LYMPHOCYTES # BLD AUTO: 2.62 THOUSAND/UL (ref 0.5–4)
LYMPHOCYTES # BLD AUTO: 38 % (ref 25–45)
MCH RBC QN AUTO: 30.4 PG (ref 26–34)
MCHC RBC AUTO-ENTMCNC: 32.5 G/DL (ref 31–36)
MCV RBC AUTO: 94 FL (ref 80–100)
MONOCYTES # BLD AUTO: 0.41 THOUSAND/UL (ref 0.2–0.9)
MONOCYTES NFR BLD AUTO: 6 % (ref 1–10)
NEUTS SEG # BLD: 3.8 THOUSAND/UL (ref 1.8–7.8)
NEUTS SEG NFR BLD AUTO: 55 %
PLATELET # BLD AUTO: 240 THOUSANDS/UL (ref 150–450)
PLATELET BLD QL SMEAR: ADEQUATE
PMV BLD AUTO: 9.3 FL (ref 8.9–12.7)
RBC # BLD AUTO: 4.75 MILLION/UL (ref 4–5.2)
RBC MORPH BLD: NORMAL
TOTAL CELLS COUNTED SPEC: 100
WBC # BLD AUTO: 6.9 THOUSAND/UL (ref 4.5–11)

## 2019-09-20 PROCEDURE — 85007 BL SMEAR W/DIFF WBC COUNT: CPT | Performed by: PSYCHIATRY & NEUROLOGY

## 2019-09-20 PROCEDURE — 82948 REAGENT STRIP/BLOOD GLUCOSE: CPT

## 2019-09-20 PROCEDURE — 85027 COMPLETE CBC AUTOMATED: CPT | Performed by: PSYCHIATRY & NEUROLOGY

## 2019-09-20 RX ADMIN — MONTELUKAST SODIUM 10 MG: 10 TABLET, COATED ORAL at 21:30

## 2019-09-20 RX ADMIN — PANTOPRAZOLE SODIUM 40 MG: 40 TABLET, DELAYED RELEASE ORAL at 05:58

## 2019-09-20 RX ADMIN — CLOZAPINE 50 MG: 25 TABLET ORAL at 21:30

## 2019-09-20 RX ADMIN — FLUTICASONE FUROATE AND VILANTEROL TRIFENATATE 1 PUFF: 200; 25 POWDER RESPIRATORY (INHALATION) at 08:27

## 2019-09-20 RX ADMIN — LEVOTHYROXINE SODIUM 125 MCG: 125 TABLET ORAL at 05:58

## 2019-09-20 RX ADMIN — FLUOXETINE 20 MG: 20 CAPSULE ORAL at 08:27

## 2019-09-20 RX ADMIN — CLOZAPINE 50 MG: 25 TABLET ORAL at 17:49

## 2019-09-20 RX ADMIN — TIOTROPIUM BROMIDE 18 MCG: 18 CAPSULE ORAL; RESPIRATORY (INHALATION) at 08:27

## 2019-09-20 RX ADMIN — THEOPHYLLINE ANHYDROUS 200 MG: 200 CAPSULE, EXTENDED RELEASE ORAL at 08:27

## 2019-09-20 NOTE — PROGRESS NOTES
09/20/19 0900   Team Meeting   Meeting Type Daily Rounds   Team Members Present   Team Members Present Physician;Nurse;; Other (Discipline and Name)   Physician Team Member Dr Cesario Patterson Team Member Jenniffer Bustamante RN   Care Management Team Member Brenda Tompkins   Other (Discipline and Name) [facility], [facility]     Patient has a podiatry appointment next week  No motivation or energy  Excuses for why she can't do anything  Slept

## 2019-09-20 NOTE — PLAN OF CARE
Problem: Alteration in Thoughts and Perception  Goal: Agree to be compliant with medication regime, as prescribed and report medication side effects  Description  Interventions:  - Offer appropriate PRN medication and supervise ingestion; conduct aims, as needed   Outcome: Progressing     Problem: RESPIRATORY - ADULT  Goal: Achieves optimal ventilation and oxygenation  Description  INTERVENTIONS:  - Assess for changes in respiratory status  - Assess for changes in mentation and behavior  - Position to facilitate oxygenation and minimize respiratory effort  - Oxygen administration by appropriate delivery method based on oxygen saturation (per order) or ABGs  - Initiate smoking cessation education as indicated  - Encourage broncho-pulmonary hygiene including cough, deep breathe, Incentive Spirometry  - Assess the need for suctioning and aspirate as needed  - Assess and instruct to report SOB or any respiratory difficulty  - Respiratory Therapy support as indicated  Outcome: Progressing     Problem: Alteration in Thoughts and Perception  Goal: Attend and participate in unit activities, including therapeutic, recreational, and educational groups  Description  Interventions:  - Provide therapeutic and educational activities daily, encourage attendance and participation, and document same in the medical record     CERTIFIED PEER SPECIALIST INTERVENTIONS:    Complete peer assessment with patient to assess their needs and identify their goals to complete while in the recovery program as well as once discharged into the community  Patient will complete WRAP Plan, Crisis Plan and 5 Life Domains  Patient will attend 50% of groups offered on the unit  Patient will complete a goal card weekly      Outcome: Not Progressing     Problem: Depression  Goal: Refrain from isolation  Description  Interventions:  - Develop a trusting relationship   - Encourage socialization   Outcome: Not Progressing     [address] continues to isolate in her room in bed  Continues to raise objections, make excuses why can't get up, exercise a little by walking to dining room & back or out in [place]  But, did come out for meal, eating 75%  Came up on own for scheduled medicines  Did not attend PM Group  Did have an HS snack  Used the [employer] & continues to show improvement, sometimes reaching volumes as high as 1250, usually 1000ml  Is wearing her QHS humidified nasal O2 @ 1L for bed  She is pleasant, socializes w/roommate when she is in room w/her

## 2019-09-20 NOTE — PROGRESS NOTES
Pharmacy Clozapine Monitoring Progress Note     Dea Summers is receiving a total daily dose of 100 mg of clozapine  Pharmacist Recommendations Based on Assessment and Plan (below):    1  Patient is Clozapine-REMS eligible, ANC was updated, with weekly monitoring frequency and the next [address] is due on 9/27/2019     2  Recommend prophylactic bowel regimen  Assessment/Plan:    Phase of Treatment:     Current phase of treatment is initation/titration  Patient Clozapine REMS Eligibility:     Patient is eligible to receive clozapine and the [address] monitoring frequency is weekly per clozapine REMS  The patient's latest [address] value has been updated into the Clozapine-REMS program          ANC and Clozapine Level (if available)     The most recent [address] was   Lab Results   Component Value Date    NEUTROABS 3 80 09/20/2019    and the next lab is due on 9/27/19  Last Clozapine level (if available):    0   Lab Value Date/Time    CLOZAPINE 324 (L) 08/16/2019 1131     0   Lab Value Date/Time    NCLOZIP 207 08/16/2019 1131           Monitoring Parameters for Clozapine:     Clozapine Adverse Effect Suggested Monitoring Patient's Current Status    Agranulocytosis ANC baseline and then repeat weekly for first 6 months and every 2 weeks for the second 6 months  Maintenance after one year of therapy every month  The most recent [address] was   Lab Results   Component Value Date    NEUTROABS 3 80 09/20/2019       This ANC is considered normal range (ANC >/= 1500/mcL)  Continue current treatment and monitoring course  Respiratory Depression Please ensure patient is not on concomitant respiratory lowering medications such as benzodiazepines, sedative hypnotics (eg  zolpidem) This patient is not on respiratory depression lowering medications   Myocarditis Baseline and weekly EKG, CRP, BNP, and troponin  Weekly symptomatic complaints of fatigue, dyspnea, tachycardia, chest pain, palpitations, and fever for the first 4 weeks  Last EKG Results on  8/21/19 showed:  T wave abnormalities    Last QTC value was:  0   Lab Value Date/Time    QTCINT 427 08/21/2019 1133       Last BNP was: No results found for: NTBNP    Last Troponin was:  0   Lab Value Date/Time    TROPONINI <0 01 05/01/2019 1318    TROPONINI <0 04 05/10/2015 1948        Orthostatic hypotension/  bradycardia Blood pressure and vital signs      Monitor blood pressure and orthostatic hypotension at least twice daily upon initiation and continue to follow at least once daily afterwards  Patient's last recorded vital signs:       /71 (BP Location: Right arm) Comment: standing 100/59 pulse 110  Pulse 101   Temp 97 7 °F (36 5 °C)   Resp 20   Ht 5' 4" (1 626 m)   Wt 68 4 kg (150 lb 12 8 oz)   SpO2 93% Comment: Pre application of QHS humidified nasal O2 @ 1L  BMI 25 88 kg/m²             Constipation (bowel obstruction due to high anticholinergic clozapine burden) Assess at baseline and weekly during first four months of therapy  Docusate 100mg BID and Miralax 17 grams daily recommended initially  Prophylactic bowel regimen not ordered and it is recommended to order a standing bowel regimen to reduce risk of bowel obstruction associated with clozapine therapy  Sialorrhea Assess at baseline and weekly during first four months of therapy  May be managed using sublingual anticholinergic preparations (atropine 1% eye drops)  Patient is not experiencing sialorrhea  This will continue to be monitored  Please note that per current REMS protocol the provider can submit a treatment rationale that justifies clozapine treatment even if patient parameters are outside of REMS recommendations  The Clozapine REMS is an FDA-mandated program implemented by the manufacturers of clozapine  ([employer] cy  com/CpmgClozapineUI/home  u)  Pharmacy will continue to follow patient with team, thank you    Electronically signed by: Jaimie Jara, PharmD, BCPP, Clinical Pharmacist - Psychiatry

## 2019-09-20 NOTE — PLAN OF CARE
Problem: Alteration in Thoughts and Perception  Goal: Treatment Goal: Gain control of psychotic behaviors/thinking, reduce/eliminate presenting symptoms and demonstrate improved reality functioning upon discharge  Outcome: Progressing  Goal: Verbalize thoughts and feelings  Description  Interventions:  - Promote a nonjudgmental and trusting relationship with the patient through active listening and therapeutic communication  - Assess patient's level of functioning, behavior and potential for risk  - Engage patient in 1 on 1 interactions for a minimum of 15 minutes each session  - Encourage patient to express fears, feelings, frustrations, and discuss symptoms    - [place] patient to reality, help patient recognize reality-based thinking   - Administer medications as ordered and assess for potential side effects  - Provide the patient education related to the signs and symptoms of the illness and desired effects of prescribed medications  Outcome: Progressing  Goal: Agree to be compliant with medication regime, as prescribed and report medication side effects  Description  Interventions:  - Offer appropriate PRN medication and supervise ingestion; conduct aims, as needed   Outcome: Progressing  Goal: Attend and participate in unit activities, including therapeutic, recreational, and educational groups  Description  Interventions:  - Provide therapeutic and educational activities daily, encourage attendance and participation, and document same in the medical record     CERTIFIED PEER SPECIALIST INTERVENTIONS:    Complete peer assessment with patient to assess their needs and identify their goals to complete while in the recovery program as well as once discharged into the community  Patient will complete WRAP Plan, Crisis Plan and [facility]  Patient will attend 50% of groups offered on the unit  Patient will complete a goal card weekly      Outcome: Progressing  Goal: Recognize dysfunctional thoughts, communicate reality-based thoughts at the time of discharge  Description  Interventions:  - Provide medication and psycho-education to assist patient in compliance and developing insight into his/her illness   Outcome: Progressing  Goal: Complete daily ADLs, including personal hygiene independently, as able  Description  Interventions:  - Observe, teach, and assist patient with ADLS  - Monitor and promote a balance of rest/activity, with adequate nutrition and elimination   Outcome: Progressing     Problem: Ineffective Coping  Goal: Identifies ineffective coping skills  Outcome: Progressing  Goal: Identifies healthy coping skills  Outcome: Progressing  Goal: Demonstrates healthy coping skills  Outcome: Progressing  Goal: Participates in unit activities  Description  Interventions:  - Provide therapeutic environment   - Provide required programming   - Redirect inappropriate behaviors   Outcome: Progressing  Goal: Patient/Family participate in treatment and DC plans  Description  Interventions:  - Provide therapeutic environment  Outcome: Progressing  Goal: Patient/Family verbalizes awareness of resources  Outcome: Progressing  Goal: Understands least restrictive measures  Description  Interventions:  - Utilize least restrictive behavior  Outcome: Progressing     Problem: Risk for Self Injury/Neglect  Goal: Treatment Goal: Remain safe during length of stay, learn and adopt new coping skills, and be free of self-injurious ideation, impulses and acts at the time of discharge  Outcome: Progressing  Goal: Verbalize thoughts and feelings  Description  Interventions:  - Assess and re-assess patient's lethality and potential for self-injury  - Engage patient in 1:1 interactions, daily, for a minimum of 15 minutes  - Encourage patient to express feelings, fears, frustrations, hopes  - Establish rapport/trust with patient   Outcome: Progressing  Goal: Refrain from harming self  Description  Interventions:  - Monitor patient closely, per order  - Develop a trusting relationship  - Supervise medication ingestion, monitor effects and side effects   Outcome: Progressing  Goal: Attend and participate in unit activities, including therapeutic, recreational, and educational groups  Description  Interventions:  - Provide therapeutic and educational activities daily, encourage attendance and participation, and document same in the medical record  - Obtain collateral information, encourage visitation and family involvement in care   Outcome: Progressing  Goal: Recognize maladaptive responses and adopt new coping mechanisms  Outcome: Progressing  Goal: Complete daily ADLs, including personal hygiene independently, as able  Description  Interventions:  - Observe, teach, and assist patient with ADLS  - Monitor and promote a balance of rest/activity, with adequate nutrition and elimination  Outcome: Progressing     Problem: Depression  Goal: Treatment Goal: Demonstrate behavioral control of depressive symptoms, verbalize feelings of improved mood/affect, and adopt new coping skills prior to discharge  Outcome: Progressing  Goal: Verbalize thoughts and feelings  Description  Interventions:  - Assess and re-assess patient's level of risk   - Engage patient in 1:1 interactions, daily, for a minimum of 15 minutes   - Encourage patient to express feelings, fears, frustrations, hopes   Outcome: Progressing  Goal: Refrain from isolation  Description  Interventions:  - Develop a trusting relationship   - Encourage socialization   Outcome: Progressing  Goal: Refrain from self-neglect  Outcome: Progressing     Problem: Anxiety  Goal: Anxiety is at manageable level  Description  Interventions:  - Assess and monitor patient's anxiety level  - Monitor for signs and symptoms of anxiety both physical and emotional (heart palpitations, chest pain, shortness of breath, headaches, nausea, feeling jumpy, restlessness, irritable, apprehensive)     - Collaborate with interdisciplinary team and initiate plan and interventions as ordered  - [place] patient to unit/surroundings  - Explain treatment plan  - Encourage participation in care  - Encourage verbalization of concerns/fears  - Identify coping mechanisms  - Assist in developing anxiety-reducing skills  - Administer/offer alternative therapies  - Limit or eliminate stimulants  Outcome: Progressing     Problem: Alteration in Orientation  Goal: Interact with staff daily  Description  Interventions:  - Assess and re-assess patient's level of orientation  - Engage patient in 1 on 1 interactions, daily, for a minimum of 15 minutes   - Establish rapport/trust with patient   Outcome: Progressing  Goal: Cooperate with recommended testing/procedures  Description  Interventions:  - Determine need for ancillary testing  - Observe for mental status changes  - Implement falls/precaution protocol   Outcome: Progressing     Problem: PAIN - ADULT  Goal: Verbalizes/displays adequate comfort level or baseline comfort level  Description  Interventions:  - Encourage patient to monitor pain and request assistance  - Assess pain using appropriate pain scale  - Administer analgesics based on type and severity of pain and evaluate response  - Implement non-pharmacological measures as appropriate and evaluate response  - Consider cultural and social influences on pain and pain management  - Notify physician/advanced practitioner if interventions unsuccessful or patient reports new pain  Outcome: Progressing     Problem: SAFETY ADULT  Goal: Patient will remain free of falls  Description  INTERVENTIONS:  - Assess patient frequently for physical needs  -  Identify cognitive and physical deficits and behaviors that affect risk of falls    -  [place] fall precautions as indicated by assessment   - Educate patient/family on patient safety including physical limitations  - Instruct patient to call for assistance with activity based on assessment  - Modify environment to reduce risk of injury  - Consider OT/PT consult to assist with strengthening/mobility  Outcome: Progressing     Problem: RESPIRATORY - ADULT  Goal: Achieves optimal ventilation and oxygenation  Description  INTERVENTIONS:  - Assess for changes in respiratory status  - Assess for changes in mentation and behavior  - Position to facilitate oxygenation and minimize respiratory effort  - Oxygen administration by appropriate delivery method based on oxygen saturation (per order) or ABGs  - Initiate smoking cessation education as indicated  - Encourage broncho-pulmonary hygiene including cough, deep breathe, Incentive Spirometry  - Assess the need for suctioning and aspirate as needed  - Assess and instruct to report SOB or any respiratory difficulty  - Respiratory Therapy support as indicated  Outcome: Progressing     Problem: DISCHARGE PLANNING  Goal: Discharge to home or other facility with appropriate resources  Description  INTERVENTIONS:  - Conduct assessment to determine patient/family and health care team treatment goals, and need for post-acute services based on payer coverage, community resources, and patient preferences, and barriers to discharge  - Address psychosocial, clinical, and financial barriers to discharge as identified in assessment in conjunction with the patient/family and health care team  - Assist the patient in reintegration back into the community by removing barriers which may hinder a successful discharge once deemed stable  - Arrange appropriate level of post-acute services according to patient's needs and preference and payer coverage in collaboration with the physician and health care team  - Communicate with and update the patient/family, physician, and health care team regarding progress on the discharge plan  - Arrange appropriate transportation to post-acute venues    Outcome: Progressing    9/20/19 Shift 7-3  BM-9/18  Shower 9/18   Groups: IMR, Journaling  Meals:  100% breakfast, 25% lunch   Isolative, poor motivation, low energy level, visible intermittently, no incentive spirometer done this shift  Admits depressed and anxious    Podiatry appointment scheduled next week

## 2019-09-20 NOTE — PLAN OF CARE
Problem: Alteration in Orientation  Goal: Cooperate with recommended testing/procedures  Description  Interventions:  - Determine need for ancillary testing  - Observe for mental status changes  - Implement falls/precaution protocol   Outcome: Progressing   Patient refused to allow staff to draw her blood for scheduled labs  Tyshawn Carlson will allow staff to draw blood "after breakfast"

## 2019-09-20 NOTE — ASSESSMENT & PLAN NOTE
Psychiatry Progress Note    Patient is still refusing to take another shower today even though she had her last shower 2 days ago  She claims that she is less tired since the morning dose of clozapine was discontinued but continues to accuse staff of being [employer] to her  She is still trying to meet requirement of attending at least 2 groups a day but has a tendency to lay back on her bed most of the time  Staff has been reminding her daily to take showers get out of bed and attend groups  She continues to verbalize that she does not feel quite safe on the unit becoming psychosomatic  She continues to tend to be suspicious about certain staff who gives her medications like her inhalers and the spirometer which has not changed   She again claims that she is not breathing properly despite breathing appropriately and having nasal oxygen on at night with acceptable pulse ox when checked by staff most of the time  She was told that if she improves her group attendance and other requirements by attending to her ADLs skills and we can always looking to discharging her back to the [facility] home again  She continues to present with stilted speech being too formal as if talking in a court of law which has not changed either  She does not admit to any overt hallucinations or paranoia but still appears to harbor some covert  paranoia based on her demeanor and interaction  She is still talks about a 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 a few years back for her own protection so they can monitor her  She has chosen not to to use the portable oxygen to get out of bed and go to groups if necessary  She remains guarded suspicious evasive and in denial of her illness on an ongoing basis  No current suicidal homicidal thoughts intent or plans reported  No overt hallucinations or other delusions reported    No signs or sxs of agranulocytosis or myocarditis or endocarditis and she is moving her bowels so far with no issues    Patient was again reminded to attend to ADLs skills and take showers at least every other day and attend more groups  Current medications:    Current Facility-Administered Medications:     acetaminophen (TYLENOL) tablet 650 mg, 650 mg, Oral, Q6H PRN, Sarah Hanna MD    albuterol (PROVENTIL HFA,VENTOLIN HFA) inhaler 2 puff, 2 puff, Inhalation, Q4H PRN, Sarah Hanna MD, 2 puff at 07/25/19 1200    aluminum-magnesium hydroxide-simethicone (MYLANTA) 200-200-20 mg/5 mL oral suspension 15 mL, 15 mL, Oral, Q4H PRN, Sarah Hanna MD    ammonium lactate (LAC-HYDRIN) 12 % lotion 1 application, 1 application, Topical, BID PRN, Sarah Hanna MD    benzonatate (TESSALON PERLES) capsule 100 mg, 100 mg, Oral, TID PRN, Sarah Hanna MD    benztropine (COGENTIN) injection 1 mg, 1 mg, Intramuscular, Q8H PRN, Sarah Hanna MD    cloZAPine (CLOZARIL) tablet 50 mg, 50 mg, Oral, BID, Sarah Hanna MD, 50 mg at 09/19/19 2129    EPINEPHrine PF (ADRENALIN) 1 mg/mL injection 0 15 mg, 0 15 mg, Intramuscular, Once PRN, Sarah Hanna MD    FLUoxetine (PROzac) capsule 20 mg, 20 mg, Oral, Daily, Sarah Hanna MD, 20 mg at 09/20/19 0827    fluticasone-vilanterol (BREO ELLIPTA) 200-25 MCG/INH inhaler 1 puff, 1 puff, Inhalation, Daily, Sarah Hanna MD, 1 puff at 09/20/19 0827    ketotifen (ZADITOR) 0 025 % ophthalmic solution 1 drop, 1 drop, Right Eye, BID PRN, Sarah Hanna MD    levothyroxine tablet 125 mcg, 125 mcg, Oral, Early Morning, Sarah Hanna MD, 125 mcg at 09/20/19 0558    magnesium hydroxide (MILK OF MAGNESIA) 400 mg/5 mL oral suspension 30 mL, 30 mL, Oral, Daily PRN, Sarah Hanna MD    montelukast (SINGULAIR) tablet 10 mg, 10 mg, Oral, HS, Sarah Hanna MD, 10 mg at 09/19/19 2129    OLANZapine (ZyPREXA) IM injection 5 mg, 5 mg, Intramuscular, Q8H PRN, Sarah Hanna MD    OLANZapine (ZyPREXA) tablet 5 mg, 5 mg, Oral, Q8H PRN, Mario Pleas Michelle Zepeda MD    pantoprazole (PROTONIX) EC tablet 40 mg, 40 mg, Oral, Early Morning, Greer Beltran MD, 40 mg at 09/20/19 0558    polyethylene glycol (MIRALAX) packet 17 g, 17 g, Oral, Daily PRN, Greer Beltran MD    polyvinyl alcohol (LIQUIFILM TEARS) 1 4 % ophthalmic solution 1 drop, 1 drop, Both Eyes, Q3H PRN, Greer Beltran MD    theophylline (JEF-24) 24 hr capsule 200 mg, 200 mg, Oral, Daily, Greer Beltran MD, 200 mg at 09/20/19 0827    tiotropium [facility]) capsule for inhaler 18 mcg, 18 mcg, Inhalation, Daily, Greer Beltran MD, 18 mcg at 09/20/19 0827    traZODone (DESYREL) tablet 25 mg, 25 mg, Oral, HS PRN, Greer Beltran MD  Justification if on more than two antipsychotics:  Only on his clozapine  Side effects if any:  None    Risks , benefits, side effects and precautions of medications discussed with patient and reminded patient to let us know any problems with medications     Objective:     Vital Signs:  Vitals:    09/19/19 1610 09/19/19 2005 09/19/19 2130 09/20/19 0700   BP: 101/64 98/65  110/71   BP Location: Left arm Left arm  Right arm   Pulse: 87 91  101   Resp: 18 18  20   Temp: 97 7 °F (36 5 °C) (!) 97 2 °F (36 2 °C)  97 7 °F (36 5 °C)   TempSrc: Temporal Temporal     SpO2: 93% 93% 93%    Weight:       Height:         Appearance:  age appropriate, casually dressed and overweight older than stated age   Behavior:  deviant, evasive and guarded and suspicious but with good eye contact   Speech:  normal pitch and normal volume but tends to become loud at times   Mood:  anxious and dysthymic   Affect:  mood-congruent   Thought Process:  goal directed and illogical slightly pressured but able to be redirected and talks as if in a court of low being stilted   Thought Content:  delusions  grandiose and somatic about not being able to breathe despite being on nasal oxygen and paranoid about people out to harm her and Atrovent inhaler tainteded with peanut oil    No current suicidal homicidal thoughts intent or plans reported  No phobias obsessions or compulsions reported   Perceptual Disturbances: None and does not appear responding to internal stimuli   Risk Potential: Tendency to not care for herself if she goes off treatment   Sensorium:  person, place, time/date, situation, day of week, month of year and time   Cognition:  grossly intact   Consciousness:  alert and awake    Attention: Intact concentration and attention span   Intellect: Considered to be at least of average intelligence   Insight:  limited in denial   Judgment: poor      Motor Activity: no abnormal movements         Recent Labs:  Results Reviewed     None          I/O Past 24 hours:  No intake/output data recorded  No intake/output data recorded  Assessment / Plan:     Schizoaffective disorder, bipolar type ([facility] [address] )      Reason for continued inpatient care:  Because of underlying paranoia and refusal to go back to the personal care home and inability to care for herself on her own  Acceptance by patient:  Accepting  Nithin Serra in recovery:  About living in another personal care home once she feels better  Understanding of medications :  Has some understanding  Involved in reintegration process:  Adjusting to the unit  Trusting in relatoinship with psychiatrist:  Trusting    Recommended Treatment:    Medication changes:  1) no changes today  Non-pharmacological treatments  1) continue with  individual therapy group therapy, milieu therapy and occupational therapy and milieu therapy involving multidisciplinary team approach with psychotherapist, case management, nursing, peer support services, art therapist etc using recovery principles and psycho-education about accepting illness and need for treatment   3) encourage to attend to ADLs like taking showers and wearing clean clothes   4) Encourage to attend groups   Safety  1) Safety/communication plan established targeting dynamic risk factors above    Discharge Plan most likely back to the a: Personal care boarding home with act services once her delusions are managed and mood becomes more stabilized and she becomes more receptive to treatment compliance    Counseling / Coordination of Care    Total floor / unit time spent today 15 minutes  Greater than 50% of total time was spent with the patient and / or family counseling and / or coordination of care  A description of the counseling / coordination of care  Patient's Rights, confidentiality and exceptions to confidentiality, use of automated medical record, [facility] staff access to medical record, and consent to treatment reviewed      Krystyna Mcclain MD

## 2019-09-20 NOTE — PROGRESS NOTES
Progress Note - Nina Bello 1955, 60 y o  female MRN: [id number]    Unit/Bed#: [facility] 924-06 Encounter: [id number]    Primary Care Provider: Callum Black MD   Date and time admitted to hospital: 7/23/2019  5:30 PM        * Schizoaffective disorder, bipolar type [facility])  Assessment & Plan  Psychiatry Progress Note    Patient is still refusing to take another shower today even though she had her last shower 2 days ago  She claims that she is less tired since the morning dose of clozapine was discontinued but continues to accuse staff of being Tomás Porch to her  She is still trying to meet requirement of attending at least 2 groups a day but has a tendency to lay back on her bed most of the time  Staff has been reminding her daily to take showers get out of bed and attend groups  She continues to verbalize that she does not feel quite safe on the unit becoming psychosomatic  She continues to tend to be suspicious about certain staff who gives her medications like her inhalers and the spirometer which has not changed   She again claims that she is not breathing properly despite breathing appropriately and having nasal oxygen on at night with acceptable pulse ox when checked by staff most of the time  She was told that if she improves her group attendance and other requirements by attending to her ADLs skills and we can always looking to discharging her back [facility] [place] [facility]  She continues to present with stilted speech being too formal as if talking in a court of law which has not changed either  She does not admit to any overt hallucinations or paranoia but still appears to harbor some covert  paranoia based on her demeanor and interaction  She is still talks about a 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 a few years back for her own protection so they can monitor her   She has chosen not to to use the portable oxygen to get out of bed and go to groups if necessary  She remains guarded suspicious evasive and in denial of her illness on an ongoing basis  No current suicidal homicidal thoughts intent or plans reported  No overt hallucinations or other delusions reported   No signs or sxs of agranulocytosis or myocarditis or endocarditis and she is moving her bowels so far with no issues    Patient was again reminded to attend to ADLs skills and take showers at least every other day and attend more groups  Current medications:    Current Facility-Administered Medications:     acetaminophen (TYLENOL) tablet 650 mg, 650 mg, Oral, Q6H PRN, Zac Sierra MD    albuterol (PROVENTIL HFA,VENTOLIN HFA) inhaler 2 puff, 2 puff, Inhalation, Q4H PRN, Zac Sierra MD, 2 puff at 07/25/19 1200    aluminum-magnesium hydroxide-simethicone (MYLANTA) 200-200-20 mg/5 mL oral suspension 15 mL, 15 mL, Oral, Q4H PRN, Zac Seirra MD    ammonium lactate (LAC-HYDRIN) 12 % lotion 1 application, 1 application, Topical, BID PRN, Zac Sierra MD    benzonatate (TESSALON PERLES) capsule 100 mg, 100 mg, Oral, TID PRN, Zac Sierra MD    benztropine (COGENTIN) injection 1 mg, 1 mg, Intramuscular, Q8H PRN, Zac Sierra MD    cloZAPine (CLOZARIL) tablet 50 mg, 50 mg, Oral, BID, Zac Sierra MD, 50 mg at 09/19/19 2129    EPINEPHrine PF (ADRENALIN) 1 mg/mL injection 0 15 mg, 0 15 mg, Intramuscular, Once PRN, Zac Sierra MD    FLUoxetine (PROzac) capsule 20 mg, 20 mg, Oral, Daily, Zac Sierra MD, 20 mg at 09/20/19 0827    fluticasone-vilanterol (BREO ELLIPTA) 200-25 MCG/INH inhaler 1 puff, 1 puff, Inhalation, Daily, Zac Sierra MD, 1 puff at 09/20/19 0827    ketotifen (ZADITOR) 0 025 % ophthalmic solution 1 drop, 1 drop, Right Eye, BID PRN, Zac Sierra MD    levothyroxine tablet 125 mcg, 125 mcg, Oral, Early Morning, Zac Sierra MD, 125 mcg at 09/20/19 0582    magnesium hydroxide (MILK OF MAGNESIA) 400 mg/5 mL oral suspension 30 mL, 30 mL, Oral, Daily PRN, Shilpa Trujillo MD    montelukast (SINGULAIR) tablet 10 mg, 10 mg, Oral, HS, Shilpa Trujillo MD, 10 mg at 09/19/19 2129    OLANZapine (ZyPREXA) IM injection 5 mg, 5 mg, Intramuscular, Q8H PRN, Shilpa Trujillo MD    OLANZapine (ZyPREXA) tablet 5 mg, 5 mg, Oral, Q8H PRN, Shilpa Trujillo MD    pantoprazole (PROTONIX) EC tablet 40 mg, 40 mg, Oral, Early Morning, Shilpa Trujillo MD, 40 mg at 09/20/19 0558    polyethylene glycol (MIRALAX) packet 17 g, 17 g, Oral, Daily PRN, Shilpa Trujillo MD    polyvinyl alcohol (LIQUIFILM TEARS) 1 4 % ophthalmic solution 1 drop, 1 drop, Both Eyes, Q3H PRN, Shilpa Trujillo MD    theophylline (JEF-24) 24 hr capsule 200 mg, 200 mg, Oral, Daily, Shilpa Trujillo MD, 200 mg at 09/20/19 0827    tiotropium [facility]) capsule for inhaler 18 mcg, 18 mcg, Inhalation, Daily, Shilpa Trujillo MD, 18 mcg at 09/20/19 0827    traZODone (DESYREL) tablet 25 mg, 25 mg, Oral, HS PRN, Shilpa Trujillo MD  Justification if on more than two antipsychotics:  Only on his clozapine  Side effects if any:  None    Risks , benefits, side effects and precautions of medications discussed with patient and reminded patient to let us know any problems with medications     Objective:     Vital Signs:  Vitals:    09/19/19 1610 09/19/19 2005 09/19/19 2130 09/20/19 0700   BP: 101/64 98/65  110/71   BP Location: Left arm Left arm  Right arm   Pulse: 87 91  101   Resp: 18 18  20   Temp: 97 7 °F (36 5 °C) (!) 97 2 °F (36 2 °C)  97 7 °F (36 5 °C)   TempSrc: Temporal Temporal     SpO2: 93% 93% 93%    Weight:       Height:         Appearance:  age appropriate, casually dressed and overweight older than stated age   Behavior:  deviant, evasive and guarded and suspicious but with good eye contact   Speech:  normal pitch and normal volume but tends to become loud at times   Mood:  anxious and dysthymic   Affect:  mood-congruent   Thought Process:  goal directed and illogical slightly pressured but able to be redirected and talks as if in a court of low being stilted   Thought Content:  delusions  grandiose and somatic about not being able to breathe despite being on nasal oxygen and paranoid about people out to harm her and Atrovent inhaler tainteded with peanut oil  No current suicidal homicidal thoughts intent or plans reported  No phobias obsessions or compulsions reported   Perceptual Disturbances: None and does not appear responding to internal stimuli   Risk Potential: Tendency to not care for herself if she goes off treatment   Sensorium:  person, place, time/date, situation, day of week, month of year and time   Cognition:  grossly intact   Consciousness:  alert and awake    Attention: Intact concentration and attention span   Intellect: Considered to be at least of average intelligence   Insight:  limited in denial   Judgment: poor      Motor Activity: no abnormal movements         Recent Labs:  Results Reviewed     None          I/O Past 24 hours:  No intake/output data recorded  No intake/output data recorded          Assessment / Plan:     Schizoaffective disorder, bipolar type ([facility] )      Reason for continued inpatient care:  Because of underlying paranoia and refusal to go back to the personal care home and inability to care for herself on her own  Acceptance by patient:  Accepting  Pauletta Dose in recovery:  About living in another personal care home once she feels better  Understanding of medications :  Has some understanding  Involved in reintegration process:  Adjusting to the unit  Trusting in relatoinship with psychiatrist:  Trusting    Recommended Treatment:    Medication changes:  1) no changes today  Non-pharmacological treatments  1) continue with  individual therapy group therapy, milieu therapy and occupational therapy and milieu therapy involving multidisciplinary team approach with psychotherapist, case management, nursing, peer support services, art therapist etc using recovery principles and psycho-education about accepting illness and need for treatment   3) encourage to attend to ADLs like taking showers and wearing clean clothes   4) Encourage to attend groups   Safety  1) Safety/communication plan established targeting dynamic risk factors above  Discharge Plan most likely back to the a:  Personal care boarding home with act services once her delusions are managed and mood becomes more stabilized and she becomes more receptive to treatment compliance    Counseling / Coordination of Care    Total floor / unit time spent today 15 minutes  Greater than 50% of total time was spent with the patient and / or family counseling and / or coordination of care  A description of the counseling / coordination of care  Patient's Rights, confidentiality and exceptions to confidentiality, use of automated medical record, [facility] staff access to medical record, and consent to treatment reviewed      Deep Durand MD

## 2019-09-20 NOTE — PLAN OF CARE
Problem: Alteration in Thoughts and Perception  Goal: Agree to be compliant with medication regime, as prescribed and report medication side effects  Description  Interventions:  - Offer appropriate PRN medication and supervise ingestion; conduct aims, as needed   Outcome: Progressing     Problem: PAIN - ADULT  Goal: Verbalizes/displays adequate comfort level or baseline comfort level  Description  Interventions:  - Encourage patient to monitor pain and request assistance  - Assess pain using appropriate pain scale  - Administer analgesics based on type and severity of pain and evaluate response  - Implement non-pharmacological measures as appropriate and evaluate response  - Consider cultural and social influences on pain and pain management  - Notify physician/advanced practitioner if interventions unsuccessful or patient reports new pain  Outcome: Progressing     Problem: SAFETY ADULT  Goal: Patient will remain free of falls  Description  INTERVENTIONS:  - Assess patient frequently for physical needs  -  Identify cognitive and physical deficits and behaviors that affect risk of falls    -  [place] fall precautions as indicated by assessment   - Educate patient/family on patient safety including physical limitations  - Instruct patient to call for assistance with activity based on assessment  - Modify environment to reduce risk of injury  - Consider OT/PT consult to assist with strengthening/mobility  Outcome: Progressing     Problem: RESPIRATORY - ADULT  Goal: Achieves optimal ventilation and oxygenation  Description  INTERVENTIONS:  - Assess for changes in respiratory status  - Assess for changes in mentation and behavior  - Position to facilitate oxygenation and minimize respiratory effort  - Oxygen administration by appropriate delivery method based on oxygen saturation (per order) or ABGs  - Initiate smoking cessation education as indicated  - Encourage broncho-pulmonary hygiene including cough, deep breathe, Incentive Spirometry  - Assess the need for suctioning and aspirate as needed  - Assess and instruct to report SOB or any respiratory difficulty  - Respiratory Therapy support as indicated  Outcome: Progressing   The patient slept through the night with no behaviors or issues noted  Oxygen maintained at 1 liter via NC while in bed  No s/s respiratory distress noted  Fall precautions maintained  Med compliant  Continue to monitor

## 2019-09-21 PROCEDURE — 99231 SBSQ HOSP IP/OBS SF/LOW 25: CPT | Performed by: NURSE PRACTITIONER

## 2019-09-21 RX ADMIN — TIOTROPIUM BROMIDE 18 MCG: 18 CAPSULE ORAL; RESPIRATORY (INHALATION) at 08:52

## 2019-09-21 RX ADMIN — FLUOXETINE 20 MG: 20 CAPSULE ORAL at 08:53

## 2019-09-21 RX ADMIN — CLOZAPINE 50 MG: 25 TABLET ORAL at 17:07

## 2019-09-21 RX ADMIN — LEVOTHYROXINE SODIUM 125 MCG: 125 TABLET ORAL at 06:01

## 2019-09-21 RX ADMIN — PANTOPRAZOLE SODIUM 40 MG: 40 TABLET, DELAYED RELEASE ORAL at 06:01

## 2019-09-21 RX ADMIN — FLUTICASONE FUROATE AND VILANTEROL TRIFENATATE 1 PUFF: 200; 25 POWDER RESPIRATORY (INHALATION) at 08:51

## 2019-09-21 RX ADMIN — MONTELUKAST SODIUM 10 MG: 10 TABLET, COATED ORAL at 21:22

## 2019-09-21 RX ADMIN — THEOPHYLLINE ANHYDROUS 200 MG: 200 CAPSULE, EXTENDED RELEASE ORAL at 08:53

## 2019-09-21 RX ADMIN — CLOZAPINE 50 MG: 25 TABLET ORAL at 21:22

## 2019-09-21 NOTE — PLAN OF CARE
Problem: Alteration in Thoughts and Perception  Goal: Verbalize thoughts and feelings  Description  Interventions:  - Promote a nonjudgmental and trusting relationship with the patient through active listening and therapeutic communication  - Assess patient's level of functioning, behavior and potential for risk  - Engage patient in 1 on 1 interactions for a minimum of 15 minutes each session  - Encourage patient to express fears, feelings, frustrations, and discuss symptoms    - [place] patient to reality, help patient recognize reality-based thinking   - Administer medications as ordered and assess for potential side effects  - Provide the patient education related to the signs and symptoms of the illness and desired effects of prescribed medications  Outcome: Progressing  Goal: Attend and participate in unit activities, including therapeutic, recreational, and educational groups  Description  Interventions:  - Provide therapeutic and educational activities daily, encourage attendance and participation, and document same in the medical record     CERTIFIED PEER SPECIALIST INTERVENTIONS:    Complete peer assessment with patient to assess their needs and identify their goals to complete while in the recovery program as well as once discharged into the community  Patient will complete WRAP Plan, Crisis Plan and 5 Life Domains  Patient will attend 50% of groups offered on the unit  Patient will complete a goal card weekly      Outcome: Progressing  Goal: Complete daily ADLs, including personal hygiene independently, as able  Description  Interventions:  - Observe, teach, and assist patient with ADLS  - Monitor and promote a balance of rest/activity, with adequate nutrition and elimination   Outcome: Progressing     Problem: Risk for Self Injury/Neglect  Goal: Refrain from harming self  Description  Interventions:  - Monitor patient closely, per order  - Develop a trusting relationship  - Supervise medication ingestion, monitor effects and side effects   Outcome: Progressing  Goal: Complete daily ADLs, including personal hygiene independently, as able  Description  Interventions:  - Observe, teach, and assist patient with ADLS  - Monitor and promote a balance of rest/activity, with adequate nutrition and elimination  Outcome: Progressing     Problem: Anxiety  Goal: Anxiety is at manageable level  Description  Interventions:  - Assess and monitor patient's anxiety level  - Monitor for signs and symptoms of anxiety both physical and emotional (heart palpitations, chest pain, shortness of breath, headaches, nausea, feeling jumpy, restlessness, irritable, apprehensive)  - Collaborate with interdisciplinary team and initiate plan and interventions as ordered  - [place] patient to unit/surroundings  - Explain treatment plan  - Encourage participation in care  - Encourage verbalization of concerns/fears  - Identify coping mechanisms  - Assist in developing anxiety-reducing skills  - Administer/offer alternative therapies  - Limit or eliminate stimulants  Outcome: Progressing     Problem: SAFETY ADULT  Goal: Patient will remain free of falls  Description  INTERVENTIONS:  - Assess patient frequently for physical needs  -  Identify cognitive and physical deficits and behaviors that affect risk of falls    -  [place] fall precautions as indicated by assessment   - Educate patient/family on patient safety including physical limitations  - Instruct patient to call for assistance with activity based on assessment  - Modify environment to reduce risk of injury  - Consider OT/PT consult to assist with strengthening/mobility  Outcome: Progressing     Problem: RESPIRATORY - ADULT  Goal: Achieves optimal ventilation and oxygenation  Description  INTERVENTIONS:  - Assess for changes in respiratory status  - Assess for changes in mentation and behavior  - Position to facilitate oxygenation and minimize respiratory effort  - Oxygen administration by appropriate delivery method based on oxygen saturation (per order) or ABGs  - Initiate smoking cessation education as indicated  - Encourage broncho-pulmonary hygiene including cough, deep breathe, Incentive Spirometry  - Assess the need for suctioning and aspirate as needed  - Assess and instruct to report SOB or any respiratory difficulty  - Respiratory Therapy support as indicated  Outcome: Progressing     Problem: Depression  Goal: Refrain from isolation  Description  Interventions:  - Develop a trusting relationship   - Encourage socialization   Outcome: Not Progressing   Avery Jang has been isolative to her room most of the shift  She either sits in bed or is napping  Social with select peers when she does come out for meal/medication  Needs prompting to come for medication  Took pills without an issue  Ate 100% of her meal and then went back to her room  She has not showered for a few days and has been encouraged to do so  Body odor is noted  Pleasant and cooperative upon approach  Did not watch the movie or go out on the patio due to being in bed  Did not eat snack  Prompted for HS medication  Oxygen saturation 96% on room air prior to Oxygen 1 liter nasal cannula applied  Continue to monitor  Precautions maintained

## 2019-09-21 NOTE — PLAN OF CARE
Problem: Alteration in Thoughts and Perception  Goal: Agree to be compliant with medication regime, as prescribed and report medication side effects  Description  Interventions:  - Offer appropriate PRN medication and supervise ingestion; conduct aims, as needed   Outcome: Progressing     Problem: Alteration in Thoughts and Perception  Goal: Attend and participate in unit activities, including therapeutic, recreational, and educational groups  Description  Interventions:  - Provide therapeutic and educational activities daily, encourage attendance and participation, and document same in the medical record     CERTIFIED PEER SPECIALIST INTERVENTIONS:    Complete peer assessment with patient to assess their needs and identify their goals to complete while in the recovery program as well as once discharged into the community  Patient will complete WRAP Plan, Crisis Plan and 5 Life Domains  Patient will attend 50% of groups offered on the unit  Patient will complete a goal card weekly  Outcome: Not Progressing  Goal: Recognize dysfunctional thoughts, communicate reality-based thoughts at the time of discharge  Description  Interventions:  - Provide medication and psycho-education to assist patient in compliance and developing insight into his/her illness   Outcome: Not Progressing  Goal: Complete daily ADLs, including personal hygiene independently, as able  Description  Interventions:  - Observe, teach, and assist patient with ADLS  - Monitor and promote a balance of rest/activity, with adequate nutrition and elimination   Outcome: Not Progressing     Problem: Ineffective Coping  Goal: Demonstrates healthy coping skills  Outcome: Not Progressing     Problem: Depression  Goal: Refrain from isolation  Description  Interventions:  - Develop a trusting relationship   - Encourage socialization   Outcome: Not Progressing     2015 Oscar Duncan is somatically focused, tonight upon her fear of choking   References lunch yesterday where says nearly choked on the last bite  Unwilling to eat her turkey sandwich because of these fears  Unwilling to try any suggestions offered her (small bites, use mulligan, separate the meat out & eat it, the bread separately, have liquids on hand to moisten mouth/wash down food)  Ate only the Sherbet  Was asked why didn't drink her milk & juice  Indicates was just too flustered w/worry  At [address] @ desk asking to have accu check done as reports history of hypoglycemia w/nausea, light headed, dizzyness, but, unclear as to whether experiencing any of these  Accu check was 135  This reassured her  Did come to window for supper medicine  No effort made to do any hygiene, grooming  Isolates in room in bed in free time  Did not respond to invitation to PM Group

## 2019-09-21 NOTE — PROGRESS NOTES
2145 Rena Rausch used her Incentive Spirometer reaching volumes as high as 1250, usually 1000ml  Did have a substantial HS snack of yogurt, banana, chips  Came up on own for HS medicine  Wearing her QHS humidified nasal O2 @ 1L for bed

## 2019-09-21 NOTE — PROGRESS NOTES
Pt received @ 0700  Blunted but pleasant & cooperative  Med/ meal complaint, ate 100 & 75% of meals  Attended 1/2 groups  Visible on unit intermittently, sleeping @ present   Will continue to monitor

## 2019-09-21 NOTE — PLAN OF CARE
Problem: Alteration in Thoughts and Perception  Goal: Agree to be compliant with medication regime, as prescribed and report medication side effects  Description  Interventions:  - Offer appropriate PRN medication and supervise ingestion; conduct aims, as needed   Outcome: Progressing     Problem: PAIN - ADULT  Goal: Verbalizes/displays adequate comfort level or baseline comfort level  Description  Interventions:  - Encourage patient to monitor pain and request assistance  - Assess pain using appropriate pain scale  - Administer analgesics based on type and severity of pain and evaluate response  - Implement non-pharmacological measures as appropriate and evaluate response  - Consider cultural and social influences on pain and pain management  - Notify physician/advanced practitioner if interventions unsuccessful or patient reports new pain  Outcome: Progressing     Problem: SAFETY ADULT  Goal: Patient will remain free of falls  Description  INTERVENTIONS:  - Assess patient frequently for physical needs  -  Identify cognitive and physical deficits and behaviors that affect risk of falls    -  [place] fall precautions as indicated by assessment   - Educate patient/family on patient safety including physical limitations  - Instruct patient to call for assistance with activity based on assessment  - Modify environment to reduce risk of injury  - Consider OT/PT consult to assist with strengthening/mobility  Outcome: Progressing     Problem: RESPIRATORY - ADULT  Goal: Achieves optimal ventilation and oxygenation  Description  INTERVENTIONS:  - Assess for changes in respiratory status  - Assess for changes in mentation and behavior  - Position to facilitate oxygenation and minimize respiratory effort  - Oxygen administration by appropriate delivery method based on oxygen saturation (per order) or ABGs  - Initiate smoking cessation education as indicated  - Encourage broncho-pulmonary hygiene including cough, deep breathe, Incentive Spirometry  - Assess the need for suctioning and aspirate as needed  - Assess and instruct to report SOB or any respiratory difficulty  - Respiratory Therapy support as indicated  Outcome: Progressing   The patient slept through the night with no behaviors or issues noted  No s/s respiratory distress noted  Oxygen maintained at 1 liter via NC  Fall precautions maintained  Med compliant  Continue to monitor

## 2019-09-21 NOTE — PROGRESS NOTES
Progress Note - Behavioral Health   Gigi Jenkins 60 y o  female MRN: [id number]  [facility] Encounter: [id number]    Patient was seen for continuing care and treatment  Case was discussed with the nursing staff  On examination take on the patient is seen lying in her bed  She tells me I am resting   She is pleasant cooperative on examination  She is offering no somatic complaints today  There are no new clinical issues or concerns verbalized by the patient or the staff  Continue current meds and treatment per Dr Sean Osborne  Discharge planning and disposition remain on going      Behavior over the last 24 hours:  improved  Sleep: normal  Appetite: normal  Medication side effects: No  ROS: no complaints    Medications:   Current Facility-Administered Medications   Medication Dose Route Frequency    acetaminophen (TYLENOL) tablet 650 mg  650 mg Oral Q6H PRN    albuterol (PROVENTIL HFA,VENTOLIN HFA) inhaler 2 puff  2 puff Inhalation Q4H PRN    aluminum-magnesium hydroxide-simethicone (MYLANTA) 200-200-20 mg/5 mL oral suspension 15 mL  15 mL Oral Q4H PRN    ammonium lactate (LAC-HYDRIN) 12 % lotion 1 application  1 application Topical BID PRN    benzonatate (TESSALON PERLES) capsule 100 mg  100 mg Oral TID PRN    benztropine (COGENTIN) injection 1 mg  1 mg Intramuscular Q8H PRN    cloZAPine (CLOZARIL) tablet 50 mg  50 mg Oral BID    EPINEPHrine PF (ADRENALIN) 1 mg/mL injection 0 15 mg  0 15 mg Intramuscular Once PRN    FLUoxetine (PROzac) capsule 20 mg  20 mg Oral Daily    fluticasone-vilanterol (BREO ELLIPTA) 200-25 MCG/INH inhaler 1 puff  1 puff Inhalation Daily    ketotifen (ZADITOR) 0 025 % ophthalmic solution 1 drop  1 drop Right Eye BID PRN    levothyroxine tablet 125 mcg  125 mcg Oral Early Morning    magnesium hydroxide (MILK OF MAGNESIA) 400 mg/5 mL oral suspension 30 mL  30 mL Oral Daily PRN    montelukast (SINGULAIR) tablet 10 mg  10 mg Oral HS    OLANZapine (ZyPREXA) IM injection 5 mg  5 mg Intramuscular Q8H PRN    OLANZapine (ZyPREXA) tablet 5 mg  5 mg Oral Q8H PRN    pantoprazole (PROTONIX) EC tablet 40 mg  40 mg Oral Early Morning    polyethylene glycol (MIRALAX) packet 17 g  17 g Oral Daily PRN    polyvinyl alcohol (LIQUIFILM TEARS) 1 4 % ophthalmic solution 1 drop  1 drop Both Eyes Q3H PRN    theophylline (JEF-24) 24 hr capsule 200 mg  200 mg Oral Daily    tiotropium (SPIRIVA) capsule for inhaler 18 mcg  18 mcg Inhalation Daily    traZODone (DESYREL) tablet 25 mg  25 mg Oral HS PRN       Labs:   No results displayed because visit has over 200 results  Mental Status Evaluation:  Appearance:  casually dressed   Behavior:  cooperative   Speech:  normal volume   Mood:  euthymic   Affect:  constricted   Thought Process:  circumstantial   Thought Content:  obsessions   Perceptual Disturbances: None   Risk Potential: none   Sensorium:  person and place   Cognition:  grossly intact   Consciousness:  alert and awake    Attention: attention span appeared shorter than expected for age   Insight:  limited   Judgment: limited   Gait/Station: normal gait/station   Motor Activity: no abnormal movements     Progress Toward Goals: Improving mood    Assessment/Plan   Principal Problem:    Schizoaffective disorder, bipolar type (HCC)  Active Problems:    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[facility] [address] )    Acquired hypothyroidism    Gastroesophageal reflux disease without esophagitis      Recommended Treatment: Continue with group therapy, milieu therapy and occupational therapy  Risks, benefits and possible side effects of Medications:   Risks, benefits, and possible side effects of medications explained to patient and patient verbalizes understanding  Counseling / Coordination of Care  Total floor / unit time spent today 15 minutes  Greater than 50% of total time was spent with the patient and / or family counseling and / or coordination of care   A description of the counseling / coordination of care: 10

## 2019-09-22 RX ADMIN — TIOTROPIUM BROMIDE 18 MCG: 18 CAPSULE ORAL; RESPIRATORY (INHALATION) at 08:47

## 2019-09-22 RX ADMIN — CLOZAPINE 50 MG: 25 TABLET ORAL at 21:13

## 2019-09-22 RX ADMIN — FLUOXETINE 20 MG: 20 CAPSULE ORAL at 08:46

## 2019-09-22 RX ADMIN — FLUTICASONE FUROATE AND VILANTEROL TRIFENATATE 1 PUFF: 200; 25 POWDER RESPIRATORY (INHALATION) at 08:47

## 2019-09-22 RX ADMIN — THEOPHYLLINE ANHYDROUS 200 MG: 200 CAPSULE, EXTENDED RELEASE ORAL at 08:46

## 2019-09-22 RX ADMIN — CLOZAPINE 50 MG: 25 TABLET ORAL at 17:00

## 2019-09-22 RX ADMIN — PANTOPRAZOLE SODIUM 40 MG: 40 TABLET, DELAYED RELEASE ORAL at 06:15

## 2019-09-22 RX ADMIN — MONTELUKAST SODIUM 10 MG: 10 TABLET, COATED ORAL at 21:14

## 2019-09-22 RX ADMIN — LEVOTHYROXINE SODIUM 125 MCG: 125 TABLET ORAL at 06:15

## 2019-09-22 NOTE — PLAN OF CARE
Problem: SAFETY ADULT  Goal: Patient will remain free of falls  Description  INTERVENTIONS:  - Assess patient frequently for physical needs  -  Identify cognitive and physical deficits and behaviors that affect risk of falls  -  [place] fall precautions as indicated by assessment   - Educate patient/family on patient safety including physical limitations  - Instruct patient to call for assistance with activity based on assessment  - Modify environment to reduce risk of injury  - Consider OT/PT consult to assist with strengthening/mobility  Outcome: Progressing     Problem: RESPIRATORY - ADULT  Goal: Achieves optimal ventilation and oxygenation  Description  INTERVENTIONS:  - Assess for changes in respiratory status  - Assess for changes in mentation and behavior  - Position to facilitate oxygenation and minimize respiratory effort  - Oxygen administration by appropriate delivery method based on oxygen saturation (per order) or ABGs  - Initiate smoking cessation education as indicated  - Encourage broncho-pulmonary hygiene including cough, deep breathe, Incentive Spirometry  - Assess the need for suctioning and aspirate as needed  - Assess and instruct to report SOB or any respiratory difficulty  - Respiratory Therapy support as indicated  Outcome: Progressing     Patient slept throughout shift with no behaviors or issues noted  Pt safety, aspiration and fall precautions maintained  Remained on 1L of oxygen throughout shift  Will continue to monitor

## 2019-09-22 NOTE — PLAN OF CARE
Problem: Alteration in Thoughts and Perception  Goal: Attend and participate in unit activities, including therapeutic, recreational, and educational groups  Description  Interventions:  - Provide therapeutic and educational activities daily, encourage attendance and participation, and document same in the medical record     CERTIFIED PEER SPECIALIST INTERVENTIONS:    Complete peer assessment with patient to assess their needs and identify their goals to complete while in the recovery program as well as once discharged into the community  Patient will complete WRAP Plan, Crisis Plan and 5 Life Domains  Patient will attend 50% of groups offered on the unit  Patient will complete a goal card weekly  Outcome: Not Progressing  Goal: Complete daily ADLs, including personal hygiene independently, as able  Description  Interventions:  - Observe, teach, and assist patient with ADLS  - Monitor and promote a balance of rest/activity, with adequate nutrition and elimination   Outcome: Not Progressing    Individual has been keeping to self all shift, visible only at meal times  She continues with lack of motivation and low energy level  She has not participated in programming, no groups were attended  There is minimal interactions with both staff and peers  She continues with poor hygiene, declines staff assistance with showering, last recorded 9/18/19  She utilized incentive spirometer when prompted to do so by staff  Availability of staff made known  Will continue to monitor

## 2019-09-22 NOTE — PLAN OF CARE
Problem: Alteration in Thoughts and Perception  Goal: Agree to be compliant with medication regime, as prescribed and report medication side effects  Description  Interventions:  - Offer appropriate PRN medication and supervise ingestion; conduct aims, as needed   Outcome: Progressing     Problem: Risk for Self Injury/Neglect  Goal: Refrain from harming self  Description  Interventions:  - Monitor patient closely, per order  - Develop a trusting relationship  - Supervise medication ingestion, monitor effects and side effects   Outcome: Progressing     Problem: Alteration in Orientation  Goal: Interact with staff daily  Description  Interventions:  - Assess and re-assess patient's level of orientation  - Engage patient in 1 on 1 interactions, daily, for a minimum of 15 minutes   - Establish rapport/trust with patient   Outcome: Progressing     Problem: SAFETY ADULT  Goal: Patient will remain free of falls  Description  INTERVENTIONS:  - Assess patient frequently for physical needs  -  Identify cognitive and physical deficits and behaviors that affect risk of falls    -  [place] fall precautions as indicated by assessment   - Educate patient/family on patient safety including physical limitations  - Instruct patient to call for assistance with activity based on assessment  - Modify environment to reduce risk of injury  - Consider OT/PT consult to assist with strengthening/mobility  Outcome: Progressing     Problem: RESPIRATORY - ADULT  Goal: Achieves optimal ventilation and oxygenation  Description  INTERVENTIONS:  - Assess for changes in respiratory status  - Assess for changes in mentation and behavior  - Position to facilitate oxygenation and minimize respiratory effort  - Oxygen administration by appropriate delivery method based on oxygen saturation (per order) or ABGs  - Initiate smoking cessation education as indicated  - Encourage broncho-pulmonary hygiene including cough, deep breathe, Incentive Spirometry  - Assess the need for suctioning and aspirate as needed  - Assess and instruct to report SOB or any respiratory difficulty  - Respiratory Therapy support as indicated  Outcome: Progressing     Problem: Alteration in Thoughts and Perception  Goal: Attend and participate in unit activities, including therapeutic, recreational, and educational groups  Description  Interventions:  - Provide therapeutic and educational activities daily, encourage attendance and participation, and document same in the medical record     CERTIFIED PEER SPECIALIST INTERVENTIONS:    Complete peer assessment with patient to assess their needs and identify their goals to complete while in the recovery program as well as once discharged into the community  Patient will complete WRAP Plan, Crisis Plan and 5 Life Domains  Patient will attend 50% of groups offered on the unit  Patient will complete a goal card weekly  Outcome: Not Progressing  Goal: Complete daily ADLs, including personal hygiene independently, as able  Description  Interventions:  - Observe, teach, and assist patient with ADLS  - Monitor and promote a balance of rest/activity, with adequate nutrition and elimination   Outcome: Not Progressing     Problem: Depression  Goal: Refrain from isolation  Description  Interventions:  - Develop a trusting relationship   - Encourage socialization   Outcome: Not Progressing  Goal: Refrain from self-neglect  Outcome: Not Roby Khan has been in her room and in bed most of the shift  Encouraged to come out of her room  She came out for water at the beginning of the shift  Social with staff and select peers when she did come out  Incentive spirometer with nurse supervision  Only got to 750-1000 ml's  No complaint of shortness of breath  Encouraged by 2 MHT's to shower this evening, but she stated that she would do it tomorrow   "I don't sweat, get my period or have sex so I don't smell " She was gently told by MHT that she did have a body odor noted  She would not even change her clothing that she has had on for days  Very disheveled appearance  Prompted for medication  Ate 100% of her meal and then went back to bed  Did not attend evening group due to laying in bed and napping at times  Prompted for HS medication  Ate snack  Oxygen level 92% on room air prior to oxygen 1 liter nasal cannula applied  Continue to monitor  Precautions maintained

## 2019-09-22 NOTE — PROGRESS NOTES
Progress Note - Behavioral Health   Dillon Butt 60 y o  female MRN: [id number]  [facility] 794-21 Encounter: [id number]    Patient seen, chart reviewed and discussed with staff  Nursing staff notes the patient continues to be isolative to self and to room  She comes out for meals and then retreats back to bed  Staff also notes that she has been declining with ADLs  Patient is calm, cooperative and smiling on approach  She is seen lying in bed  She offers no complaints of depression and denies current anxiety  Patient states that she has not been showering due to increased anxiety and panic when she is in the shower  Patient states that she feels better when she has help and staff showering her  She denies any suicidal or homicidal ideation  Denies any auditory or visual hallucinations  Reports that she is eating and sleeping well      Behavior over the last 24 hours:  unchanged  Sleep: hypersomnia  Appetite: normal  Medication side effects: No  ROS: no complaints  /68 (BP Location: Right arm) Comment:  120/67 pulse 102  Pulse 93   Temp 97 7 °F (36 5 °C)   Resp 18   Ht 5' 4" (1 626 m)   Wt 68 5 kg (151 lb)   SpO2 96%   BMI 25 92 kg/m²     Medications:   Current Facility-Administered Medications   Medication Dose Route Frequency    acetaminophen (TYLENOL) tablet 650 mg  650 mg Oral Q6H PRN    albuterol (PROVENTIL HFA,VENTOLIN HFA) inhaler 2 puff  2 puff Inhalation Q4H PRN    aluminum-magnesium hydroxide-simethicone (MYLANTA) 200-200-20 mg/5 mL oral suspension 15 mL  15 mL Oral Q4H PRN    ammonium lactate (LAC-HYDRIN) 12 % lotion 1 application  1 application Topical BID PRN    benzonatate (TESSALON PERLES) capsule 100 mg  100 mg Oral TID PRN    benztropine (COGENTIN) injection 1 mg  1 mg Intramuscular Q8H PRN    cloZAPine (CLOZARIL) tablet 50 mg  50 mg Oral BID    EPINEPHrine PF (ADRENALIN) 1 mg/mL injection 0 15 mg  0 15 mg Intramuscular Once PRN    FLUoxetine (PROzac) capsule 20 mg  20 mg Oral Daily    fluticasone-vilanterol (BREO ELLIPTA) 200-25 MCG/INH inhaler 1 puff  1 puff Inhalation Daily    ketotifen (ZADITOR) 0 025 % ophthalmic solution 1 drop  1 drop Right Eye BID PRN    levothyroxine tablet 125 mcg  125 mcg Oral Early Morning    magnesium hydroxide (MILK OF MAGNESIA) 400 mg/5 mL oral suspension 30 mL  30 mL Oral Daily PRN    montelukast (SINGULAIR) tablet 10 mg  10 mg Oral HS    OLANZapine (ZyPREXA) IM injection 5 mg  5 mg Intramuscular Q8H PRN    OLANZapine (ZyPREXA) tablet 5 mg  5 mg Oral Q8H PRN    pantoprazole (PROTONIX) EC tablet 40 mg  40 mg Oral Early Morning    polyethylene glycol (MIRALAX) packet 17 g  17 g Oral Daily PRN    polyvinyl alcohol (LIQUIFILM TEARS) 1 4 % ophthalmic solution 1 drop  1 drop Both Eyes Q3H PRN    theophylline (JEF-24) 24 hr capsule 200 mg  200 mg Oral Daily    tiotropium (SPIRIVA) capsule for inhaler 18 mcg  18 mcg Inhalation Daily    traZODone (DESYREL) tablet 25 mg  25 mg Oral HS PRN       Labs:   No results displayed because visit has over 200 results            Mental Status Evaluation:  Appearance:  age appropriate and disheveled   Behavior:  Calm, cooperative, good eye contact   Speech:  normal pitch and normal volume   Mood:  constricted   Affect:  constricted   Thought Process:  concrete   Thought Content:  delusions  somatic   Perceptual Disturbances: Denies auditory or visual hallucinations   Risk Potential: Denies suicidal or homicidal ideation   Sensorium:  person, place and time/date   Cognition:  grossly intact   Consciousness:  alert and awake    Attention: attention span appeared shorter than expected for age   Insight:  limited   Judgment: Poor   Gait/Station: Not observed, patient lying in bed   Motor Activity: no abnormal movements     Progress Toward Goals:  Minimal change    Assessment/Plan   Principal Problem:    Schizoaffective disorder, bipolar type (HCC)  Active Problems:    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[address] ) Acquired hypothyroidism    Gastroesophageal reflux disease without esophagitis      Recommended Treatment:   Continue with medications and treatment plan per Dr Carlee Yadav  Clozapine 50 mg b i d , CBC with diff from 09/20 was reviewed and within normal limits  Fluoxetine 20 mg daily    Continue with group therapy, milieu therapy and occupational therapy  Risks, benefits and possible side effects of Medications:   Risks, benefits, and possible side effects of medications explained to patient and patient verbalizes understanding  Counseling / Coordination of Care  Total floor / unit time spent today 25 minutes  Greater than 50% of total time was spent with the patient and / or family counseling and / or coordination of care  A description of the counseling / coordination of care:  Medication management, chart review, patient interview

## 2019-09-22 NOTE — PROGRESS NOTES
Individual continues to appear to be sleeping comfortably the remainder of the night without distress  Will continue to monitor

## 2019-09-23 RX ADMIN — MONTELUKAST SODIUM 10 MG: 10 TABLET, COATED ORAL at 21:34

## 2019-09-23 RX ADMIN — LEVOTHYROXINE SODIUM 125 MCG: 125 TABLET ORAL at 06:10

## 2019-09-23 RX ADMIN — TIOTROPIUM BROMIDE 18 MCG: 18 CAPSULE ORAL; RESPIRATORY (INHALATION) at 08:31

## 2019-09-23 RX ADMIN — THEOPHYLLINE ANHYDROUS 200 MG: 200 CAPSULE, EXTENDED RELEASE ORAL at 08:31

## 2019-09-23 RX ADMIN — PANTOPRAZOLE SODIUM 40 MG: 40 TABLET, DELAYED RELEASE ORAL at 06:10

## 2019-09-23 RX ADMIN — FLUTICASONE FUROATE AND VILANTEROL TRIFENATATE 1 PUFF: 200; 25 POWDER RESPIRATORY (INHALATION) at 08:31

## 2019-09-23 RX ADMIN — FLUOXETINE 20 MG: 20 CAPSULE ORAL at 08:31

## 2019-09-23 RX ADMIN — CLOZAPINE 50 MG: 25 TABLET ORAL at 17:47

## 2019-09-23 RX ADMIN — CLOZAPINE 50 MG: 25 TABLET ORAL at 21:34

## 2019-09-23 NOTE — PROGRESS NOTES
09/23/19 0900   Team Meeting   Meeting Type Tx Team Meeting   Initial Conference Date 09/23/19   Next Conference Date 09/30/19   Team Members Present   Team Members Present Physician;Nurse;; Other (Discipline and Name)   Physician Team Member Dr Francis Franco Team Member Lu Young, RN   Care Management Team Member Brenda Hemphill   Other (Discipline and Name) DIANA Link; Gabbie Hardin [facility] )   Patient/Family Present   Patient Present Yes   Patient's Family Present No     Patient attended treatment team meeting this morning without her self assessment completed  Patient attended 33% of groups offered last week  Patient reported she is going to be showering this morning with staff assistance  Team asked why patient hasn't gone off unit yet  Patient reports she does not feel she is ready for that because she still is concerned about her oxygen use  Patient is scheduled for a podiatry appointment tomorrow which is off unit in the [address] [place] [address]  Team and patient completed risk assessment and the patient did not verbalize any desire to elope from the program  Patient verbalized understanding of consequences of eloping from treatment while on a commitment  Patient verbalized no further questions or concerns at the conclusion of the meeting

## 2019-09-23 NOTE — PLAN OF CARE
Problem: SAFETY ADULT  Goal: Patient will remain free of falls  Description  INTERVENTIONS:  - Assess patient frequently for physical needs  -  Identify cognitive and physical deficits and behaviors that affect risk of falls  -  [place] fall precautions as indicated by assessment   - Educate patient/family on patient safety including physical limitations  - Instruct patient to call for assistance with activity based on assessment  - Modify environment to reduce risk of injury  - Consider OT/PT consult to assist with strengthening/mobility  Outcome: Progressing     Problem: RESPIRATORY - ADULT  Goal: Achieves optimal ventilation and oxygenation  Description  INTERVENTIONS:  - Assess for changes in respiratory status  - Assess for changes in mentation and behavior  - Position to facilitate oxygenation and minimize respiratory effort  - Oxygen administration by appropriate delivery method based on oxygen saturation (per order) or ABGs  - Initiate smoking cessation education as indicated  - Encourage broncho-pulmonary hygiene including cough, deep breathe, Incentive Spirometry  - Assess the need for suctioning and aspirate as needed  - Assess and instruct to report SOB or any respiratory difficulty  - Respiratory Therapy support as indicated  Outcome: Progressing    Patient sleeping throughout the night without behaviors or issues observed  Continues on 1L humidified oxygen via NC  No s/s of respiratory distress

## 2019-09-23 NOTE — ASSESSMENT & PLAN NOTE
Psychiatry Progress Note    Patient did finally take a shower yesterday after several days in a row but chose not to attend any groups  Staff has been reminding her daily to take showers get out of bed and attend groups but no avail despite lip service that she is going to try t  She continues to verbalize that she does not feel quite safe on the unit becoming psychosomatic claiming that she cannot breathe even though she does believe fine while observed  He continues to be  suspicious about certain staff who gives her medications like her inhalers and the spirometer off and on   She again claims that she is not breathing properly despite breathing appropriately and having nasal oxygen on at night with acceptable pulse ox when checked by staff most of the time  She was again reminded that  if she improves her group attendance and attends to her ADLs skills,  we can always look into discharging her back to the [place] [facility]  She continues to present with stilted speech being too formal as if talking in a court of law which has not changed either  She does not admit to any overt hallucinations or paranoia but still appears to harbor some covert  paranoia based on her demeanor and interaction on the unit  She continues to believe that 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 and she believes it was implanted by surgeons a few years back for her own protection so they can monitor her  She has chosen not to to use the portable oxygen to get out of bed and go to groups if necessary but knows that she can  She remains guarded suspicious evasive and in denial of her illness on an ongoing basis  No current suicidal homicidal thoughts intent or plans reported  No overt hallucinations or other delusions reported   No signs or sxs of agranulocytosis or myocarditis or endocarditis and she is moving her bowels so far with no issues    Patient was again reminded to attend to ADLs skills and take showers at least every other day and attend more groups     No worsening of symptoms since the clozapine was lowered to 50 mg twice and she claims a tiredness is less but she is still choosing not to attend groups  Current medications:    Current Facility-Administered Medications:     acetaminophen (TYLENOL) tablet 650 mg, 650 mg, Oral, Q6H PRN, Chichi Lockett MD    albuterol (PROVENTIL HFA,VENTOLIN HFA) inhaler 2 puff, 2 puff, Inhalation, Q4H PRN, Chichi Lockett MD, 2 puff at 07/25/19 1200    aluminum-magnesium hydroxide-simethicone (MYLANTA) 200-200-20 mg/5 mL oral suspension 15 mL, 15 mL, Oral, Q4H PRN, Chichi Lockett MD    ammonium lactate (LAC-HYDRIN) 12 % lotion 1 application, 1 application, Topical, BID PRN, Chichi Lockett MD    benzonatate (TESSALON PERLES) capsule 100 mg, 100 mg, Oral, TID PRN, Chichi Lockett MD    benztropine (COGENTIN) injection 1 mg, 1 mg, Intramuscular, Q8H PRN, Chichi Lockett MD    cloZAPine (CLOZARIL) tablet 50 mg, 50 mg, Oral, BID, Chichi Lockett MD, 50 mg at 09/22/19 2113    EPINEPHrine PF (ADRENALIN) 1 mg/mL injection 0 15 mg, 0 15 mg, Intramuscular, Once PRN, Chichi Lockett MD    FLUoxetine (PROzac) capsule 20 mg, 20 mg, Oral, Daily, Chichi Lockett MD, 20 mg at 09/23/19 0831    fluticasone-vilanterol (BREO ELLIPTA) 200-25 MCG/INH inhaler 1 puff, 1 puff, Inhalation, Daily, Chichi Lockett MD, 1 puff at 09/23/19 0831    ketotifen (ZADITOR) 0 025 % ophthalmic solution 1 drop, 1 drop, Right Eye, BID PRN, Chichi Lockett MD    levothyroxine tablet 125 mcg, 125 mcg, Oral, Early Morning, Chichi Lockett MD, 125 mcg at 09/23/19 0610    magnesium hydroxide (MILK OF MAGNESIA) 400 mg/5 mL oral suspension 30 mL, 30 mL, Oral, Daily PRN, Chichi Lockett MD    montelukast (SINGULAIR) tablet 10 mg, 10 mg, Oral, HS, Chichi Lockett MD, 10 mg at 09/22/19 2114    OLANZapine (ZyPREXA) IM injection 5 mg, 5 mg, Intramuscular, Q8H PRN, Chichi Lockett MD    OLANZapine (ZyPREXA) tablet 5 mg, 5 mg, Oral, Q8H PRN, Tree Madden MD    pantoprazole (PROTONIX) EC tablet 40 mg, 40 mg, Oral, Early Morning, Tree Madden MD, 40 mg at 09/23/19 0610    polyethylene glycol (MIRALAX) packet 17 g, 17 g, Oral, Daily PRN, Tree Madden MD    polyvinyl alcohol (LIQUIFILM TEARS) 1 4 % ophthalmic solution 1 drop, 1 drop, Both Eyes, Q3H PRN, Tree Madden MD    theophylline (JEF-24) 24 hr capsule 200 mg, 200 mg, Oral, Daily, Tree Madden MD, 200 mg at 09/23/19 0831    tiotropium (SPIRIVA) capsule for inhaler 18 mcg, 18 mcg, Inhalation, Daily, Tree Madden MD, 18 mcg at 09/23/19 0831    traZODone (DESYREL) tablet 25 mg, 25 mg, Oral, HS PRN, Tree Madden MD  Justification if on more than two antipsychotics:  Only on his clozapine  Side effects if any:  None    Risks , benefits, side effects and precautions of medications discussed with patient and reminded patient to let us know any problems with medications     Objective:     Vital Signs:  Vitals:    09/22/19 2000 09/22/19 2130 09/23/19 0700 09/23/19 0705   BP: 110/67  126/75 111/68   BP Location: Left arm  Left arm Left arm   Pulse: 92  90 100   Resp: 17 19 19   Temp: 97 6 °F (36 4 °C)  97 8 °F (36 6 °C)    TempSrc: Temporal  Temporal    SpO2: 93% 92%  93%   Weight:       Height:         Appearance:  age appropriate, casually dressed and overweight older than stated age   Behavior:  deviant, evasive and guarded and suspicious but with good eye contact   Speech:  normal pitch and normal volume but tends to become loud at times   Mood:  anxious and dysthymic   Affect:  mood-congruent   Thought Process:  goal directed and illogical slightly pressured but able to be redirected and talks as if in a court of low being stilted   Thought Content:  delusions  grandiose and somatic about not being able to breathe despite being on nasal oxygen and paranoid about people out to harm her and Atrovent inhaler tainteded with peanut oil    No current suicidal homicidal thoughts intent or plans reported  No phobias obsessions or compulsions reported   Perceptual Disturbances: None and does not appear responding to internal stimuli   Risk Potential: Tendency to not care for herself if she goes off treatment   Sensorium:  person, place, time/date, situation, day of week, month of year and time   Cognition:  grossly intact   Consciousness:  alert and awake    Attention: Intact concentration and attention span   Intellect: Considered to be at least of average intelligence   Insight:  limited in denial   Judgment: poor      Motor Activity: no abnormal movements         Recent Labs:  Results Reviewed     None          I/O Past 24 hours:  No intake/output data recorded  No intake/output data recorded  Assessment / Plan:     Schizoaffective disorder, bipolar type ([address] )      Reason for continued inpatient care:  Because of underlying paranoia and refusal to go back to the personal care home and inability to care for herself on her own  Acceptance by patient:  Accepting  [place]au [place] in recovery:  About living in another personal care home once she feels better  Understanding of medications :  Has some understanding  Involved in reintegration process:  Adjusting to the unit  Trusting in relatoinship with psychiatrist:  Trusting    Recommended Treatment:    Medication changes:  1) no changes today  Non-pharmacological treatments  1) continue with  individual therapy group therapy, milieu therapy and occupational therapy and milieu therapy involving multidisciplinary team approach with psychotherapist, case management, nursing, peer support services, art therapist etc using recovery principles and psycho-education about accepting illness and need for treatment   3) encourage to attend to ADLs like taking showers and wearing clean clothes   4) Encourage to attend groups   Safety  1) Safety/communication plan established targeting dynamic risk factors above    Discharge Plan most likely back to the a: Personal care boarding home with act services once her delusions are managed and mood becomes more stabilized and she becomes more receptive to treatment compliance    Counseling / Coordination of Care    Total floor / unit time spent today 15 minutes  Greater than 50% of total time was spent with the patient and / or family counseling and / or coordination of care  A description of the counseling / coordination of care  Patient's Rights, confidentiality and exceptions to confidentiality, use of automated medical record, [facility] staff access to medical record, and consent to treatment reviewed      Alirio Frazier MD

## 2019-09-23 NOTE — PROGRESS NOTES
09/23/19 0800   Team Meeting   Meeting Type Daily Rounds   Team Members Present   Team Members Present Physician;Nurse; Other (Discipline and Name)   Physician Team Member Dr Ruddy Reyna, LISA; Milind Vargas RN   Care Management Team Member Brenda So   Other (Discipline and Name) DIANA Larios     Patient remains entitled and somatic  Is refusing to speak with her brother at the moment  Poor hygiene  Poor motivation  Slept well

## 2019-09-23 NOTE — PROGRESS NOTES
09/23/19 1100   Activity/Group Checklist   Group Other (Comment)  (IMR - Healthy Relationships Part II)   Attendance Attended   Attendance Duration (min) 31-45   Interactions Interacted appropriately   Affect/Mood Appropriate   Goals Achieved Able to listen to others; Able to engage in interactions; Able to give feedback to another;Able to recieve feedback; Able to experience relief/decrease in symptoms     Patient participated often in today's IMR group on Healthy Relationships  She offered insightful and on-topic discussion  Patient was appropriate and respectful of peers

## 2019-09-23 NOTE — ASSESSMENT & PLAN NOTE
Psychiatry Progress Note    Patient does take showers some times and plans to take one today  She has now attended over 33% of groups  Staff has been reminding her daily to take showers get out of bed and attend groups  She continues to verbalize that she does not feel quite safe on the unit becoming psychosomatic and becomes  suspicious about certain staff who gives her medications like her inhalers and the spirometer which has not changed   She again claims that she is not breathing properly despite breathing appropriately and having nasal oxygen on at night with acceptable pulse ox when checked by staff most of the time  She was again reminded that  if she improves her group attendance and other requirements by attending to her ADLs skills,  we can always looking to discharging her back to the [place] personal care [facility] home again  She continues to present with stilted speech being too formal as if talking in a court of law which has not changed either  She does not admit to any overt hallucinations or paranoia but still appears to harbor some covert  paranoia based on her demeanor and interaction  She still talks about a 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 a few years back for her own protection so they can monitor her  She has chosen not to to use the portable oxygen to get out of bed and go to groups if necessary  She still displays stilted speech  She remains guarded suspicious evasive and in denial of her illness on an ongoing basis  No current suicidal homicidal thoughts intent or plans reported  No overt hallucinations or other delusions reported   No signs or sxs of agranulocytosis or myocarditis or endocarditis and she is moving her bowels so far with no issues    Patient was again reminded to attend to ADLs skills and take showers at least every other day and attend more groups and reminded to use her off unit privileges which she has not utilized yet   Current medications:    Current Facility-Administered Medications:     acetaminophen (TYLENOL) tablet 650 mg, 650 mg, Oral, Q6H PRN, Teri Huggins MD    albuterol (PROVENTIL HFA,VENTOLIN HFA) inhaler 2 puff, 2 puff, Inhalation, Q4H PRN, Teri Huggins MD, 2 puff at 07/25/19 1200    aluminum-magnesium hydroxide-simethicone (MYLANTA) 200-200-20 mg/5 mL oral suspension 15 mL, 15 mL, Oral, Q4H PRN, Teri Huggins MD    ammonium lactate (LAC-HYDRIN) 12 % lotion 1 application, 1 application, Topical, BID PRN, Teri Huggins MD    benzonatate (TESSALON PERLES) capsule 100 mg, 100 mg, Oral, TID PRN, Teri Huggins MD    benztropine (COGENTIN) injection 1 mg, 1 mg, Intramuscular, Q8H PRN, Teri Huggins MD    cloZAPine (CLOZARIL) tablet 50 mg, 50 mg, Oral, BID, Teri Huggins MD, 50 mg at 09/22/19 2113    EPINEPHrine PF (ADRENALIN) 1 mg/mL injection 0 15 mg, 0 15 mg, Intramuscular, Once PRN, Teri Huggins MD    FLUoxetine (PROzac) capsule 20 mg, 20 mg, Oral, Daily, Teri Huggins MD, 20 mg at 09/23/19 0831    fluticasone-vilanterol (BREO ELLIPTA) 200-25 MCG/INH inhaler 1 puff, 1 puff, Inhalation, Daily, Teri Huggins MD, 1 puff at 09/23/19 0831    ketotifen (ZADITOR) 0 025 % ophthalmic solution 1 drop, 1 drop, Right Eye, BID PRN, Teri Huggins MD    levothyroxine tablet 125 mcg, 125 mcg, Oral, Early Morning, Teri Huggins MD, 125 mcg at 09/23/19 0610    magnesium hydroxide (MILK OF MAGNESIA) 400 mg/5 mL oral suspension 30 mL, 30 mL, Oral, Daily PRN, Teri Huggins MD    montelukast (SINGULAIR) tablet 10 mg, 10 mg, Oral, HS, Teri Huggins MD, 10 mg at 09/22/19 2114    OLANZapine (ZyPREXA) IM injection 5 mg, 5 mg, Intramuscular, Q8H PRN, Teri Huggins MD    OLANZapine (ZyPREXA) tablet 5 mg, 5 mg, Oral, Q8H PRN, Teri Huggins MD    pantoprazole (PROTONIX) EC tablet 40 mg, 40 mg, Oral, Early Morning, Teri Huggins MD, 40 mg at 09/23/19 0610    polyethylene glycol (MIRALAX) packet 17 g, 17 g, Oral, Daily PRN, Zac Sierra MD    polyvinyl alcohol (LIQUIFILM TEARS) 1 4 % ophthalmic solution 1 drop, 1 drop, Both Eyes, Q3H PRN, Zac Sierra MD    theophylline (JEF-24) 24 hr capsule 200 mg, 200 mg, Oral, Daily, Zac Sierra MD, 200 mg at 09/23/19 0831    tiotropium (SPIRIVA) capsule for inhaler 18 mcg, 18 mcg, Inhalation, Daily, Zac Sierra MD, 18 mcg at 09/23/19 0831    traZODone (DESYREL) tablet 25 mg, 25 mg, Oral, HS PRN, Zac Sierra MD  Justification if on more than two antipsychotics:  Only on his clozapine  Side effects if any:  None    Risks , benefits, side effects and precautions of medications discussed with patient and reminded patient to let us know any problems with medications     Objective:     Vital Signs:  Vitals:    09/22/19 2000 09/22/19 2130 09/23/19 0700 09/23/19 0705   BP: 110/67  126/75 111/68   BP Location: Left arm  Left arm Left arm   Pulse: 92  90 100   Resp: 17 19 19   Temp: 97 6 °F (36 4 °C)  97 8 °F (36 6 °C)    TempSrc: Temporal  Temporal    SpO2: 93% 92%  93%   Weight:       Height:         Appearance:  age appropriate, casually dressed and overweight older than stated age   Behavior:  deviant, evasive and guarded and suspicious but with good eye contact   Speech:  normal pitch and normal volume but tends to become loud at times   Mood:  anxious and dysthymic   Affect:  mood-congruent   Thought Process:  goal directed and illogical slightly pressured but able to be redirected and talks as if in a court of low being stilted   Thought Content:  delusions  grandiose and somatic about not being able to breathe despite being on nasal oxygen and paranoid about people out to harm her and Atrovent inhaler tainteded with peanut oil  No current suicidal homicidal thoughts intent or plans reported    No phobias obsessions or compulsions reported   Perceptual Disturbances: None and does not appear responding to internal stimuli   Risk Potential: Tendency to not care for herself if she goes off treatment   Sensorium:  person, place, time/date, situation, day of week, month of year and time   Cognition:  grossly intact   Consciousness:  alert and awake    Attention: Intact concentration and attention span   Intellect: Considered to be at least of average intelligence   Insight:  limited in denial   Judgment: poor      Motor Activity: no abnormal movements         Recent Labs:  Results Reviewed     None          I/O Past 24 hours:  No intake/output data recorded  No intake/output data recorded  Assessment / Plan:     Schizoaffective disorder, [facility] ([facility] )      Reason for continued inpatient care:  Because of underlying paranoia and refusal to go back to the personal care home and inability to care for herself on her own  Acceptance by patient:  Accepting  Jenn [place] in recovery:  About living in another personal care home once she feels better  Understanding of medications :  Has some understanding  Involved in reintegration process:  Adjusting to the unit  Trusting in relatoinship with psychiatrist:  Trusting    Recommended Treatment:    Medication changes:  1) no changes today  Non-pharmacological treatments  1) continue with  individual therapy group therapy, milieu therapy and occupational therapy and milieu therapy involving multidisciplinary team approach with psychotherapist, case management, nursing, peer support services, art therapist etc using recovery principles and psycho-education about accepting illness and need for treatment   3) encourage to attend to ADLs like taking showers and wearing clean clothes   4) Encourage to attend groups   Safety  1) Safety/communication plan established targeting dynamic risk factors above    Discharge Plan most likely back to the a:  Personal care boarding home with act services once her delusions are managed and mood becomes more stabilized and she becomes more receptive to treatment compliance    Counseling / Coordination of Care    Total floor / unit time spent today 15 minutes  Greater than 50% of total time was spent with the patient and / or family counseling and / or coordination of care  A description of the counseling / coordination of care  Patient's Rights, confidentiality and exceptions to confidentiality, use of automated medical record, [facility] staff access to medical record, and consent to treatment reviewed      Jerome Lopez MD

## 2019-09-23 NOTE — PLAN OF CARE
Problem: Alteration in Thoughts and Perception  Goal: Verbalize thoughts and feelings  Description  Interventions:  - Promote a nonjudgmental and trusting relationship with the patient through active listening and therapeutic communication  - Assess patient's level of functioning, behavior and potential for risk  - Engage patient in 1 on 1 interactions for a minimum of 15 minutes each session  - Encourage patient to express fears, feelings, frustrations, and discuss symptoms    - [place] patient to reality, help patient recognize reality-based thinking   - Administer medications as ordered and assess for potential side effects  - Provide the patient education related to the signs and symptoms of the illness and desired effects of prescribed medications  Outcome: Progressing  Goal: Agree to be compliant with medication regime, as prescribed and report medication side effects  Description  Interventions:  - Offer appropriate PRN medication and supervise ingestion; conduct aims, as needed   Outcome: Progressing  Goal: Complete daily ADLs, including personal hygiene independently, as able  Description  Interventions:  - Observe, teach, and assist patient with ADLS  - Monitor and promote a balance of rest/activity, with adequate nutrition and elimination   Outcome: Progressing     Problem: Risk for Self Injury/Neglect  Goal: Verbalize thoughts and feelings  Description  Interventions:  - Assess and re-assess patient's lethality and potential for self-injury  - Engage patient in 1:1 interactions, daily, for a minimum of 15 minutes  - Encourage patient to express feelings, fears, frustrations, hopes  - Establish rapport/trust with patient   Outcome: Progressing  Goal: Refrain from harming self  Description  Interventions:  - Monitor patient closely, per order  - Develop a trusting relationship  - Supervise medication ingestion, monitor effects and side effects   Outcome: Progressing     Problem: Depression  Goal: Refrain from isolation  Description  Interventions:  - Develop a trusting relationship   - Encourage socialization   Outcome: Not Progressing    9/23/19 Shift 7-3  Safe and Fall percautions  BM-9/22  Shower 9/23  Groups: [facility]  Meals:  25% breakfast, 75% lunch   Isolative, poor motivation, low energy level,  incentive spirometer x1 done this shift  Preoccupied with being unable to walk with staff to her next appointment, when staff gives her alternatives to her concerns she rebukes with another reason why she cannot walk long distances  Isolative to her room for the majority of the shift but has a steady and brisk pace when getting up for snack and meals  Behaviors remain intact and appropriate

## 2019-09-23 NOTE — PROGRESS NOTES
Progress Note - Melody Ruddy 1955, 60 y o  female MRN: [id number]    Unit/Bed#: [facility] [place] [address] Encounter: [id number]    Primary Care Provider: Natividad Oseguera MD   Date and time admitted to hospital: 7/23/2019  5:30 PM        * Schizoaffective disorder, bipolar type [facility])  Assessment & Plan  Psychiatry Progress Note    Patient does take showers some times and plans to take one today  She has now attended over 33% of groups  Staff has been reminding her daily to take showers get out of bed and attend groups  She continues to verbalize that she does not feel quite safe on the unit becoming psychosomatic and becomes  suspicious about certain staff who gives her medications like her inhalers and the spirometer which has not changed   She again claims that she is not breathing properly despite breathing appropriately and having nasal oxygen on at night with acceptable pulse ox when checked by staff most of the time  She was again reminded that  if she improves her group attendance and other requirements by attending to her ADLs skills,  we can always looking to discharging her back to the [place] personal care [facility] home again  She continues to present with stilted speech being too formal as if talking in a court of law which has not changed either  She does not admit to any overt hallucinations or paranoia but still appears to harbor some covert  paranoia based on her demeanor and interaction  She still talks about a 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 a few years back for her own protection so they can monitor her  She has chosen not to to use the portable oxygen to get out of bed and go to groups if necessary  She still displays stilted speech  She remains guarded suspicious evasive and in denial of her illness on an ongoing basis  No current suicidal homicidal thoughts intent or plans reported    No overt hallucinations or other delusions reported   No signs or sxs of agranulocytosis or myocarditis or endocarditis and she is moving her bowels so far with no issues    Patient was again reminded to attend to ADLs skills and take showers at least every other day and attend more groups and reminded to use her off unit privileges which she has not utilized yet   Current medications:    Current Facility-Administered Medications:     acetaminophen (TYLENOL) tablet 650 mg, 650 mg, Oral, Q6H PRN, Jennifer Taveras MD    albuterol (PROVENTIL HFA,VENTOLIN HFA) inhaler 2 puff, 2 puff, Inhalation, Q4H PRN, Jennifer Taveras MD, 2 puff at 07/25/19 1200    aluminum-magnesium hydroxide-simethicone (MYLANTA) 200-200-20 mg/5 mL oral suspension 15 mL, 15 mL, Oral, Q4H PRN, Jennifer Taveras MD    ammonium lactate (LAC-HYDRIN) 12 % lotion 1 application, 1 application, Topical, BID PRN, Jennifer Taveras MD    benzonatate (TESSALON PERLES) capsule 100 mg, 100 mg, Oral, TID PRN, Jennifer Taveras MD    benztropine (COGENTIN) injection 1 mg, 1 mg, Intramuscular, Q8H PRN, Jennifer Taveras MD    cloZAPine (CLOZARIL) tablet 50 mg, 50 mg, Oral, BID, Jennifer Taveras MD, 50 mg at 09/22/19 2113    EPINEPHrine PF (ADRENALIN) 1 mg/mL injection 0 15 mg, 0 15 mg, Intramuscular, Once PRN, Jennifer Taveras MD    FLUoxetine (PROzac) capsule 20 mg, 20 mg, Oral, Daily, Jennifer Taveras MD, 20 mg at 09/23/19 0831    fluticasone-vilanterol (BREO ELLIPTA) 200-25 MCG/INH inhaler 1 puff, 1 puff, Inhalation, Daily, Jennifer Taveras MD, 1 puff at 09/23/19 0831    ketotifen (ZADITOR) 0 025 % ophthalmic solution 1 drop, 1 drop, Right Eye, BID PRN, Jennifer Taveras MD    levothyroxine tablet 125 mcg, 125 mcg, Oral, Early Morning, Jennifer Taveras MD, 125 mcg at 09/23/19 0610    magnesium hydroxide (MILK OF MAGNESIA) 400 mg/5 mL oral suspension 30 mL, 30 mL, Oral, Daily PRN, Jennifer Taveras MD    montelukast (SINGULAIR) tablet 10 mg, 10 mg, Oral, HS, Jennifer Taveras MD, 10 mg at 09/22/19 2114    OLANZapine (ZyPREXA) IM injection 5 mg, 5 mg, Intramuscular, Q8H PRN, Jeet Levine MD    OLANZapine (ZyPREXA) tablet 5 mg, 5 mg, Oral, Q8H PRN, Jeet Levine MD    pantoprazole (PROTONIX) EC tablet 40 mg, 40 mg, Oral, Early Morning, Jeet Levine MD, 40 mg at 09/23/19 0610    polyethylene glycol (MIRALAX) packet 17 g, 17 g, Oral, Daily PRN, Jeet Levine MD    polyvinyl alcohol (LIQUIFILM TEARS) 1 4 % ophthalmic solution 1 drop, 1 drop, Both Eyes, Q3H PRN, Jeet Levine MD    theophylline (JEF-24) 24 hr capsule 200 mg, 200 mg, Oral, Daily, Jeet Levine MD, 200 mg at 09/23/19 0831    tiotropium (SPIRIVA) capsule for inhaler 18 mcg, 18 mcg, Inhalation, Daily, Jeet Levine MD, 18 mcg at 09/23/19 0831    traZODone (DESYREL) tablet 25 mg, 25 mg, Oral, HS PRN, Jeet Levine MD  Justification if on more than two antipsychotics:  Only on his clozapine  Side effects if any:  None    Risks , benefits, side effects and precautions of medications discussed with patient and reminded patient to let us know any problems with medications     Objective:     Vital Signs:  Vitals:    09/22/19 2000 09/22/19 2130 09/23/19 0700 09/23/19 0705   BP: 110/67  126/75 111/68   BP Location: Left arm  Left arm Left arm   Pulse: 92  90 100   Resp: 17 19 19   Temp: 97 6 °F (36 4 °C)  97 8 °F (36 6 °C)    TempSrc: Temporal  Temporal    SpO2: 93% 92%  93%   Weight:       Height:         Appearance:  age appropriate, casually dressed and overweight older than stated age   Behavior:  deviant, evasive and guarded and suspicious but with good eye contact   Speech:  normal pitch and normal volume but tends to become loud at times   Mood:  anxious and dysthymic   Affect:  mood-congruent   Thought Process:  goal directed and illogical slightly pressured but able to be redirected and talks as if in a court of low being stilted   Thought Content:  delusions  grandiose and somatic about not being able to breathe despite being on nasal oxygen and paranoid about people out to harm her and Atrovent inhaler tainteded with peanut oil  No current suicidal homicidal thoughts intent or plans reported  No phobias obsessions or compulsions reported   Perceptual Disturbances: None and does not appear responding to internal stimuli   Risk Potential: Tendency to not care for herself if she goes off treatment   Sensorium:  person, place, time/date, situation, day of week, month of year and time   Cognition:  grossly intact   Consciousness:  alert and awake    Attention: Intact concentration and attention span   Intellect: Considered to be at least of average intelligence   Insight:  limited in denial   Judgment: poor      Motor Activity: no abnormal movements         Recent Labs:  Results Reviewed     None          I/O Past 24 hours:  No intake/output data recorded  No intake/output data recorded  Assessment / Plan:     Schizoaffective disorder, bipolar type ([facility] )      Reason for continued inpatient care:  Because of underlying paranoia and refusal to go back to the personal care home and inability to care for herself on her own  Acceptance by patient:  Accepting  Juan Lisa in recovery:  About living in another personal care home once she feels better  Understanding of medications :  Has some understanding  Involved in reintegration process:  Adjusting to the unit  Trusting in relatoinship with psychiatrist:  Trusting    Recommended Treatment:    Medication changes:  1) no changes today  Non-pharmacological treatments  1) continue with  individual therapy group therapy, milieu therapy and occupational therapy and milieu therapy involving multidisciplinary team approach with psychotherapist, case management, nursing, peer support services, art therapist etc using recovery principles and psycho-education about accepting illness and need for treatment     3) encourage to attend to ADLs like taking showers and wearing clean clothes   4) Encourage to attend groups   Safety  1) Safety/communication plan established targeting dynamic risk factors above  Discharge Plan most likely back to the a:  Personal care boarding home with act services once her delusions are managed and mood becomes more stabilized and she becomes more receptive to treatment compliance    Counseling / Coordination of Care    Total floor / unit time spent today 15 minutes  Greater than 50% of total time was spent with the patient and / or family counseling and / or coordination of care  A description of the counseling / coordination of care  Patient's Rights, confidentiality and exceptions to confidentiality, use of automated medical record, [facility] staff access to medical record, and consent to treatment reviewed      Dalton Garner MD

## 2019-09-24 RX ADMIN — CLOZAPINE 50 MG: 25 TABLET ORAL at 17:52

## 2019-09-24 RX ADMIN — FLUTICASONE FUROATE AND VILANTEROL TRIFENATATE 1 PUFF: 200; 25 POWDER RESPIRATORY (INHALATION) at 08:02

## 2019-09-24 RX ADMIN — PANTOPRAZOLE SODIUM 40 MG: 40 TABLET, DELAYED RELEASE ORAL at 06:09

## 2019-09-24 RX ADMIN — TIOTROPIUM BROMIDE 18 MCG: 18 CAPSULE ORAL; RESPIRATORY (INHALATION) at 08:03

## 2019-09-24 RX ADMIN — LEVOTHYROXINE SODIUM 125 MCG: 125 TABLET ORAL at 06:09

## 2019-09-24 RX ADMIN — CLOZAPINE 50 MG: 25 TABLET ORAL at 21:24

## 2019-09-24 RX ADMIN — THEOPHYLLINE ANHYDROUS 200 MG: 200 CAPSULE, EXTENDED RELEASE ORAL at 08:02

## 2019-09-24 RX ADMIN — FLUOXETINE 20 MG: 20 CAPSULE ORAL at 08:02

## 2019-09-24 RX ADMIN — MONTELUKAST SODIUM 10 MG: 10 TABLET, COATED ORAL at 21:24

## 2019-09-24 NOTE — PLAN OF CARE
Problem: Alteration in Thoughts and Perception  Goal: Agree to be compliant with medication regime, as prescribed and report medication side effects  Description  Interventions:  - Offer appropriate PRN medication and supervise ingestion; conduct aims, as needed   Outcome: Progressing     Problem: RESPIRATORY - ADULT  Goal: Achieves optimal ventilation and oxygenation  Description  INTERVENTIONS:  - Assess for changes in respiratory status  - Assess for changes in mentation and behavior  - Position to facilitate oxygenation and minimize respiratory effort  - Oxygen administration by appropriate delivery method based on oxygen saturation (per order) or ABGs  - Initiate smoking cessation education as indicated  - Encourage broncho-pulmonary hygiene including cough, deep breathe, Incentive Spirometry  - Assess the need for suctioning and aspirate as needed  - Assess and instruct to report SOB or any respiratory difficulty  - Respiratory Therapy support as indicated  Outcome: Progressing     Problem: Alteration in Thoughts and Perception  Goal: Attend and participate in unit activities, including therapeutic, recreational, and educational groups  Description  Interventions:  - Provide therapeutic and educational activities daily, encourage attendance and participation, and document same in the medical record     CERTIFIED PEER SPECIALIST INTERVENTIONS:    Complete peer assessment with patient to assess their needs and identify their goals to complete while in the recovery program as well as once discharged into the community  Patient will complete WRAP Plan, Crisis Plan and 5 Life Domains  Patient will attend 50% of groups offered on the unit  Patient will complete a goal card weekly      Outcome: Not Progressing     Problem: Depression  Goal: Refrain from isolation  Description  Interventions:  - Develop a trusting relationship   - Encourage socialization   Outcome: Not Progressing     [address] continues to isolate in room, coming out only for meal (ate 50%), for scheduled medicines, for HS snack  She is pleasant, conversational w/select staff & roommate  She is disheveled, still in hospital garb all day  She did not attend PM Group, did not go for any laps outside room, does not stay up the full hour after meal  She did do her Incentive Spirometry averaging 750-1000ml volume, usually about 900ml  Wearing the QHS humidified nasal O2@ 1L now for bed

## 2019-09-24 NOTE — PLAN OF CARE
Problem: Alteration in Thoughts and Perception  Goal: Agree to be compliant with medication regime, as prescribed and report medication side effects  Description  Interventions:  - Offer appropriate PRN medication and supervise ingestion; conduct aims, as needed   Outcome: Progressing     Problem: PAIN - ADULT  Goal: Verbalizes/displays adequate comfort level or baseline comfort level  Description  Interventions:  - Encourage patient to monitor pain and request assistance  - Assess pain using appropriate pain scale  - Administer analgesics based on type and severity of pain and evaluate response  - Implement non-pharmacological measures as appropriate and evaluate response  - Consider cultural and social influences on pain and pain management  - Notify physician/advanced practitioner if interventions unsuccessful or patient reports new pain  Outcome: Progressing     Problem: SAFETY ADULT  Goal: Patient will remain free of falls  Description  INTERVENTIONS:  - Assess patient frequently for physical needs  -  Identify cognitive and physical deficits and behaviors that affect risk of falls    -  Avery fall precautions as indicated by assessment   - Educate patient/family on patient safety including physical limitations  - Instruct patient to call for assistance with activity based on assessment  - Modify environment to reduce risk of injury  - Consider OT/PT consult to assist with strengthening/mobility  Outcome: Progressing     Problem: RESPIRATORY - ADULT  Goal: Achieves optimal ventilation and oxygenation  Description  INTERVENTIONS:  - Assess for changes in respiratory status  - Assess for changes in mentation and behavior  - Position to facilitate oxygenation and minimize respiratory effort  - Oxygen administration by appropriate delivery method based on oxygen saturation (per order) or ABGs  - Initiate smoking cessation education as indicated  - Encourage broncho-pulmonary hygiene including cough, deep breathe, Incentive Spirometry  - Assess the need for suctioning and aspirate as needed  - Assess and instruct to report SOB or any respiratory difficulty  - Respiratory Therapy support as indicated  Outcome: Progressing   The patient slept through the night with no behaviors or issues noted  Oxygen maintained at 1 liter via NC  No s/s respiratory distress noted  Fall precautions maintained  Med compliant  Continue to monitor

## 2019-09-24 NOTE — PROGRESS NOTES
Progress Note - Roselia Woody 1955, 60 y o  female MRN: [id number]    Unit/Bed#: [facility] 798-11 Encounter: [id number]    Primary Care Provider: Christopher Guzman MD   Date and time admitted to hospital: 7/23/2019  5:30 PM        * Schizoaffective disorder, bipolar type [facility])  Assessment & Plan  Psychiatry Progress Note    Patient did finally take a shower yesterday after several days in a row but chose not to attend any groups  Staff has been reminding her daily to take showers get out of bed and attend groups but no avail despite lip service that she is going to try t  She continues to verbalize that she does not feel quite safe on the unit becoming psychosomatic claiming that she cannot breathe even though she does believe fine while observed  He continues to be  suspicious about certain staff who gives her medications like her inhalers and the spirometer off and on   She again claims that she is not breathing properly despite breathing appropriately and having nasal oxygen on at night with acceptable pulse ox when checked by staff most of the time  She was again reminded that  if she improves her group attendance and attends to her ADLs skills,  we can always look into discharging her back to the [facility] care boarding home again  She continues to present with stilted speech being too formal as if talking in a court of law which has not changed either  She does not admit to any overt hallucinations or paranoia but still appears to harbor some covert  paranoia based on her demeanor and interaction on the unit  She continues to believe that 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 and she believes it was implanted by surgeons a few years back for her own protection so they can monitor her  She has chosen not to to use the portable oxygen to get out of bed and go to groups if necessary but knows that she can  She remains guarded suspicious evasive and in denial of her illness on an ongoing basis  No current suicidal homicidal thoughts intent or plans reported  No overt hallucinations or other delusions reported   No signs or sxs of agranulocytosis or myocarditis or endocarditis and she is moving her bowels so far with no issues  Patient was again reminded to attend to ADLs skills and take showers at least every other day and attend more groups     No worsening of symptoms since the clozapine was lowered to 50 mg twice and she claims a tiredness is less but she is still choosing not to attend groups  Current medications:    Current Facility-Administered Medications:     acetaminophen (TYLENOL) tablet 650 mg, 650 mg, Oral, Q6H PRN, Dalton Garner MD    albuterol (PROVENTIL HFA,VENTOLIN HFA) inhaler 2 puff, 2 puff, Inhalation, Q4H PRN, Dalton Garner MD, 2 puff at 07/25/19 1200    aluminum-magnesium hydroxide-simethicone (MYLANTA) 200-200-20 mg/5 mL oral suspension 15 mL, 15 mL, Oral, Q4H PRN, Dalton Garner MD    ammonium lactate (LAC-HYDRIN) 12 % lotion 1 application, 1 application, Topical, BID PRN, Dalton Garner MD    benzonatate (TESSALON PERLES) capsule 100 mg, 100 mg, Oral, TID PRN, Dalton Garner MD    benztropine (COGENTIN) injection 1 mg, 1 mg, Intramuscular, Q8H PRN, Dalton Garner MD    cloZAPine (CLOZARIL) tablet 50 mg, 50 mg, Oral, BID, Dalton Garner MD, 50 mg at 09/22/19 2113    EPINEPHrine PF (ADRENALIN) 1 mg/mL injection 0 15 mg, 0 15 mg, Intramuscular, Once PRN, Dalton Garner MD    FLUoxetine (PROzac) capsule 20 mg, 20 mg, Oral, Daily, Dalton Garner MD, 20 mg at 09/23/19 0831    fluticasone-vilanterol (BREO ELLIPTA) 200-25 MCG/INH inhaler 1 puff, 1 puff, Inhalation, Daily, Dalton Garner MD, 1 puff at 09/23/19 0831    ketotifen (ZADITOR) 0 025 % ophthalmic solution 1 drop, 1 drop, Right Eye, BID PRN, Dalton Garner MD    levothyroxine tablet 125 mcg, 125 mcg, Oral, Early Morning, Dalton Garner MD, 125 mcg at 09/23/19 0610    magnesium hydroxide (MILK OF MAGNESIA) 400 mg/5 mL oral suspension 30 mL, 30 mL, Oral, Daily PRN, Jill Gayle MD    montelukast (SINGULAIR) tablet 10 mg, 10 mg, Oral, HS, Jill Gayle MD, 10 mg at 09/22/19 2114    OLANZapine (ZyPREXA) IM injection 5 mg, 5 mg, Intramuscular, Q8H PRN, Jill Gayle MD    OLANZapine (ZyPREXA) tablet 5 mg, 5 mg, Oral, Q8H PRN, Jill Gayle MD    pantoprazole (PROTONIX) EC tablet 40 mg, 40 mg, Oral, Early Morning, Jill Gayle MD, 40 mg at 09/23/19 0610    polyethylene glycol (MIRALAX) packet 17 g, 17 g, Oral, Daily PRN, Jill Gayle MD    polyvinyl alcohol (LIQUIFILM TEARS) 1 4 % ophthalmic solution 1 drop, 1 drop, Both Eyes, Q3H PRN, Jill Gayle MD    theophylline (JEF-24) 24 hr capsule 200 mg, 200 mg, Oral, Daily, Jill Gayle MD, 200 mg at 09/23/19 0831    tiotropium (SPIRIVA) capsule for inhaler 18 mcg, 18 mcg, Inhalation, Daily, Jill Gayle MD, 18 mcg at 09/23/19 0831    traZODone (DESYREL) tablet 25 mg, 25 mg, Oral, HS PRN, Jill Gayle MD  Justification if on more than two antipsychotics:  Only on his clozapine  Side effects if any:  None    Risks , benefits, side effects and precautions of medications discussed with patient and reminded patient to let us know any problems with medications     Objective:     Vital Signs:  Vitals:    09/22/19 2000 09/22/19 2130 09/23/19 0700 09/23/19 0705   BP: 110/67  126/75 111/68   BP Location: Left arm  Left arm Left arm   Pulse: 92  90 100   Resp: 17 19 19   Temp: 97 6 °F (36 4 °C)  97 8 °F (36 6 °C)    TempSrc: Temporal  Temporal    SpO2: 93% 92%  93%   Weight:       Height:         Appearance:  age appropriate, casually dressed and overweight older than stated age   Behavior:  deviant, evasive and guarded and suspicious but with good eye contact   Speech:  normal pitch and normal volume but tends to become loud at times   Mood:  anxious and dysthymic   Affect:  mood-congruent   Thought Process:  goal directed and illogical slightly pressured but able to be redirected and talks as if in a court of low being stilted   Thought Content:  delusions  grandiose and somatic about not being able to breathe despite being on nasal oxygen and paranoid about people out to harm her and Atrovent inhaler tainteded with peanut oil  No current suicidal homicidal thoughts intent or plans reported  No phobias obsessions or compulsions reported   Perceptual Disturbances: None and does not appear responding to internal stimuli   Risk Potential: Tendency to not care for herself if she goes off treatment   Sensorium:  person, place, time/date, situation, day of week, month of year and time   Cognition:  grossly intact   Consciousness:  alert and awake    Attention: Intact concentration and attention span   Intellect: Considered to be at least of average intelligence   Insight:  limited in denial   Judgment: poor      Motor Activity: no abnormal movements         Recent Labs:  Results Reviewed     None          I/O Past 24 hours:  No intake/output data recorded  No intake/output data recorded          Assessment / Plan:     [facility]ffective [facility]      Reason for continued inpatient care:  Because of underlying paranoia and refusal to go back to the personal care home and inability to care for herself on her own  Acceptance by patient:  Accepting  Robert Yadav in recovery:  About living in another personal care home once she feels better  Understanding of medications :  Has some understanding  Involved in reintegration process:  Adjusting to the unit  Trusting in relatoinship with psychiatrist:  Trusting    Recommended Treatment:    Medication changes:  1) no changes today  Non-pharmacological treatments  1) continue with  individual therapy group therapy, milieu therapy and occupational therapy and milieu therapy involving multidisciplinary team approach with psychotherapist, case management, nursing, peer support services, art therapist etc using recovery principles and psycho-education about accepting illness and need for treatment   3) encourage to attend to ADLs like taking showers and wearing clean clothes   4) Encourage to attend groups   Safety  1) Safety/communication plan established targeting dynamic risk factors above  Discharge Plan most likely back to the a:  Personal care boarding home with act services once her delusions are managed and mood becomes more stabilized and she becomes more receptive to treatment compliance    Counseling / Coordination of Care    Total floor / unit time spent today 15 minutes  Greater than 50% of total time was spent with the patient and / or family counseling and / or coordination of care  A description of the counseling / coordination of care  Patient's Rights, confidentiality and exceptions to confidentiality, use of automated medical record, [facility] to [facility] record, and consent to treatment reviewed      Sarah Hanna MD

## 2019-09-24 NOTE — PROGRESS NOTES
09/24/19 0800   Team Meeting   Meeting Type Daily Rounds   Team Members Present   Team Members Present Physician;Nurse; Other (Discipline and Name)   Physician Team Member Dr Ana Bell RN; Martha Mathew RN   Other (Discipline and Name) DIANA Link; SHANIKA Barrios     Patient has a shower yesterday, but only takes a shower with much hands-on assistance from staff  Patient attended IMR yesterday  Patient is noted to make excuses when she is asked to walk to group, but can be observed "briskly" walking to meals  Slept well

## 2019-09-24 NOTE — PLAN OF CARE
Problem: Alteration in Thoughts and Perception  Goal: Agree to be compliant with medication regime, as prescribed and report medication side effects  Description  Interventions:  - Offer appropriate PRN medication and supervise ingestion; conduct aims, as needed   Outcome: Progressing     Problem: Risk for Self Injury/Neglect  Goal: Verbalize thoughts and feelings  Description  Interventions:  - Assess and re-assess patient's lethality and potential for self-injury  - Engage patient in 1:1 interactions, daily, for a minimum of 15 minutes  - Encourage patient to express feelings, fears, frustrations, hopes  - Establish rapport/trust with patient   Outcome: Progressing  Goal: Refrain from harming self  Description  Interventions:  - Monitor patient closely, per order  - Develop a trusting relationship  - Supervise medication ingestion, monitor effects and side effects   Outcome: Progressing     Problem: Depression  Goal: Verbalize thoughts and feelings  Description  Interventions:  - Assess and re-assess patient's level of risk   - Engage patient in 1:1 interactions, daily, for a minimum of 15 minutes   - Encourage patient to express feelings, fears, frustrations, hopes   Outcome: Progressing     Problem: Anxiety  Goal: Anxiety is at manageable level  Description  Interventions:  - Assess and monitor patient's anxiety level  - Monitor for signs and symptoms of anxiety both physical and emotional (heart palpitations, chest pain, shortness of breath, headaches, nausea, feeling jumpy, restlessness, irritable, apprehensive)  - Collaborate with interdisciplinary team and initiate plan and interventions as ordered    - [place] patient to unit/surroundings  - Explain treatment plan  - Encourage participation in care  - Encourage verbalization of concerns/fears  - Identify coping mechanisms  - Assist in developing anxiety-reducing skills  - Administer/offer alternative therapies  - Limit or eliminate stimulants  Outcome: Progressing    Isolative to room and self throughout shift, requires prompting for meals and meds  Pleasant and cooperative with interaction, attended IMR with appropriate participation  Compliant with incentive spirometer, able to reach goal of 1000  Denies any depression, appears anxious but has not requested any PRN medication  Will continue to encourage pt to get OOB and interact with peers in order to express self safely

## 2019-09-25 RX ADMIN — FLUOXETINE 20 MG: 20 CAPSULE ORAL at 08:17

## 2019-09-25 RX ADMIN — TIOTROPIUM BROMIDE 18 MCG: 18 CAPSULE ORAL; RESPIRATORY (INHALATION) at 09:16

## 2019-09-25 RX ADMIN — LEVOTHYROXINE SODIUM 125 MCG: 125 TABLET ORAL at 06:08

## 2019-09-25 RX ADMIN — CLOZAPINE 50 MG: 25 TABLET ORAL at 21:40

## 2019-09-25 RX ADMIN — FLUTICASONE FUROATE AND VILANTEROL TRIFENATATE 1 PUFF: 200; 25 POWDER RESPIRATORY (INHALATION) at 08:19

## 2019-09-25 RX ADMIN — PANTOPRAZOLE SODIUM 40 MG: 40 TABLET, DELAYED RELEASE ORAL at 06:07

## 2019-09-25 RX ADMIN — THEOPHYLLINE ANHYDROUS 200 MG: 200 CAPSULE, EXTENDED RELEASE ORAL at 08:17

## 2019-09-25 RX ADMIN — MONTELUKAST SODIUM 10 MG: 10 TABLET, COATED ORAL at 21:40

## 2019-09-25 RX ADMIN — CLOZAPINE 50 MG: 25 TABLET ORAL at 18:09

## 2019-09-25 NOTE — PROGRESS NOTES
Patient reserved and quite during group  Was not able to verbally share any behaviors within herself that she felt she needed to change        09/25/19 1100   Activity/Group Checklist   Group   (IMR/5 Stages of Change )   Attendance Attended   Attendance Duration (min) 46-60   Interactions Interacted appropriately   Affect/Mood Appropriate;Normal range   Goals Achieved Able to listen to others
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
## 2019-09-25 NOTE — PROGRESS NOTES
Patient quite and reserved  Focused on not wanting to meet with [facility] representative who came to see her  Pt adamant about "not' wanting to share information about her stay on the [facility] with that particular person however, gave no explanation or reason why         09/24/19 1500   Activity/Group Checklist   Group   (Recovery Workshop )   Attendance Attended   Attendance Duration (min) 46-60   Interactions Disorganized interaction   Affect/Mood Constricted;Blunted/flat   Goals Achieved Identified feelings; Able to engage in interactions; Able to listen to others

## 2019-09-25 NOTE — ASSESSMENT & PLAN NOTE
Psychiatry Progress Note    Staff has been reminding her daily to take showers get out of bed and attend groups but no avail despite lip service that she is going to try it  She did attend the IMR group yesterday but otherwise she was laying in bed most of the time  Today she claims that she felt like passing out but her vital signs were acceptable with no significant orthostatic drop  She continues to verbalize that she does not feel quite safe on the unit becoming psychosomatic claiming that she cannot breathe even though she does appear to breathe fine while observed  She is still  suspicious about certain staff who gives her medications like her inhalers and the spirometer off and on   She was again reminded that  if she improves her group attendance and attends to her ADLs skills,  we can always look into discharging her back to [facility] [place] [facility]  She continues to present with stilted speech being too formal as if talking in a court of law which has not changed either  She does not admit to any overt hallucinations or paranoia but still appears to harbor some covert  paranoia based on her demeanor and interaction on the unit  She continues to believe that 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 and she believes it was implanted by surgeons a few years back for her own protection so they can monitor her  No current suicidal homicidal thoughts intent or plans reported  No overt hallucinations or other delusions reported   No signs or sxs of agranulocytosis or myocarditis or endocarditis and she is moving her bowels so far with no issues  Patient was again reminded to attend to ADLs skills and take showers at least every other day and attend more groups       Current medications:    Current Facility-Administered Medications:     acetaminophen (TYLENOL) tablet 650 mg, 650 mg, Oral, Q6H PRN, Manuel Copeland MD    albuterol (PROVENTIL HFA,VENTOLIN HFA) inhaler 2 puff, 2 puff, Inhalation, Q4H PRN, Sindy Day MD, 2 puff at 07/25/19 1200    aluminum-magnesium hydroxide-simethicone (MYLANTA) 200-200-20 mg/5 mL oral suspension 15 mL, 15 mL, Oral, Q4H PRN, Sindy Day MD    ammonium lactate (LAC-HYDRIN) 12 % lotion 1 application, 1 application, Topical, BID PRN, Sindy Day MD    benzonatate (TESSALON PERLES) capsule 100 mg, 100 mg, Oral, TID PRN, Sindy Day MD    benztropine (COGENTIN) injection 1 mg, 1 mg, Intramuscular, Q8H PRN, Sindy Day MD    cloZAPine (CLOZARIL) tablet 50 mg, 50 mg, Oral, BID, Sindy Day MD, 50 mg at 09/24/19 2124    EPINEPHrine PF (ADRENALIN) 1 mg/mL injection 0 15 mg, 0 15 mg, Intramuscular, Once PRN, Sindy Day MD    FLUoxetine (PROzac) capsule 20 mg, 20 mg, Oral, Daily, Sindy Day MD, 20 mg at 09/25/19 0817    fluticasone-vilanterol (BREO ELLIPTA) 200-25 MCG/INH inhaler 1 puff, 1 puff, Inhalation, Daily, Sindy Day MD, 1 puff at 09/25/19 0819    ketotifen (ZADITOR) 0 025 % ophthalmic solution 1 drop, 1 drop, Right Eye, BID PRN, Sindy Day MD    levothyroxine tablet 125 mcg, 125 mcg, Oral, Early Morning, Sindy Day MD, 125 mcg at 09/25/19 0608    magnesium hydroxide (MILK OF MAGNESIA) 400 mg/5 mL oral suspension 30 mL, 30 mL, Oral, Daily PRN, Sindy Day MD    montelukast (SINGULAIR) tablet 10 mg, 10 mg, Oral, HS, Sindy Day MD, 10 mg at 09/24/19 2124    OLANZapine (ZyPREXA) IM injection 5 mg, 5 mg, Intramuscular, Q8H PRN, Sindy Day MD    OLANZapine (ZyPREXA) tablet 5 mg, 5 mg, Oral, Q8H PRN, Sindy Day MD    pantoprazole (PROTONIX) EC tablet 40 mg, 40 mg, Oral, Early Morning, Sindy Day MD, 40 mg at 09/25/19 0607    polyethylene glycol (MIRALAX) packet 17 g, 17 g, Oral, Daily PRN, Sindy Day MD    polyvinyl alcohol (LIQUIFILM TEARS) 1 4 % ophthalmic solution 1 drop, 1 drop, Both Eyes, Q3H PRN, Sindy Day MD    theophylline (JEF-24) 24 hr capsule 200 mg, 200 mg, Oral, Daily, Sindy Day MD, 200 mg at 09/25/19 0817    tiotropium (SPIRIVA) capsule for inhaler 18 mcg, 18 mcg, Inhalation, Daily, Vincent Olivares MD, 18 mcg at 09/25/19 0916    traZODone (DESYREL) tablet 25 mg, 25 mg, Oral, HS PRN, Vincent Olivares MD  Justification if on more than two antipsychotics:  Only on his clozapine  Side effects if any:  None    Risks , benefits, side effects and precautions of medications discussed with patient and reminded patient to let us know any problems with medications     Objective:     Vital Signs:  Vitals:    09/24/19 2000 09/24/19 2130 09/25/19 0700 09/25/19 0705   BP: (!) 106/48  101/58 103/65   BP Location: Right arm  Left arm Left arm   Pulse: 97  (!) 110 (!) 109   Resp: 18  18 18   Temp: (!) 97 4 °F (36 3 °C)  98 °F (36 7 °C)    TempSrc: Temporal  Temporal    SpO2: 97% 90% 94%    Weight:       Height:         Appearance:  age appropriate, casually dressed and overweight older than stated age   Behavior:  deviant, evasive and guarded and suspicious but with good eye contact   Speech:  normal pitch and normal volume but tends to become loud at times   Mood:  anxious and dysthymic   Affect:  mood-congruent   Thought Process:  goal directed and illogical slightly pressured but able to be redirected and talks as if in a court of low being stilted   Thought Content:  delusions  grandiose and somatic about not being able to breathe despite being on nasal oxygen and paranoid about people out to harm her and Atrovent inhaler tainteded with peanut oil  No current suicidal homicidal thoughts intent or plans reported    No phobias obsessions or compulsions reported   Perceptual Disturbances: None and does not appear responding to internal stimuli   Risk Potential: Tendency to not care for herself if she goes off treatment   Sensorium:  person, place, time/date, situation, day of week, month of year and time   Cognition:  grossly intact   Consciousness:  alert and awake    Attention: Intact concentration and attention span   Intellect: Considered to be at least of average intelligence   Insight:  limited in denial   Judgment: poor      Motor Activity: no abnormal movements         Recent Labs:  Results Reviewed     None          I/O Past 24 hours:  I/O last 3 completed shifts:  In: -   Out: 1 [Stool:1]  No intake/output data recorded  Assessment / Plan:     Schizoaffective disorder, bipolar type ([facility] [address] )      Reason for continued inpatient care:  Because of underlying paranoia and refusal to go back to the personal care home and inability to care for herself on her own  Acceptance by patient:  Accepting  Aldrich Screws in recovery:  About living in another personal care home once she feels better  Understanding of medications :  Has some understanding  Involved in reintegration process:  Adjusting to the unit  Trusting in relatoinship with psychiatrist:  Trusting    Recommended Treatment:    Medication changes:  1) no changes today  Non-pharmacological treatments  1) continue with  individual therapy group therapy, milieu therapy and occupational therapy and milieu therapy involving multidisciplinary team approach with psychotherapist, case management, nursing, peer support services, art therapist etc using recovery principles and psycho-education about accepting illness and need for treatment   3) encourage to attend to ADLs like taking showers and wearing clean clothes   4) Encourage to attend groups   Safety  1) Safety/communication plan established targeting dynamic risk factors above  Discharge Plan most likely back to the a:  Personal care boarding home with act services once her delusions are managed and mood becomes more stabilized and she becomes more receptive to treatment compliance    Counseling / Coordination of Care    Total floor / unit time spent today 15 minutes  Greater than 50% of total time was spent with the patient and / or family counseling and / or coordination of care   A description of the counseling / coordination of care  Patient's Rights, confidentiality and exceptions to confidentiality, use of automated medical record, [address] staff access to medical record, and consent to treatment reviewed      Vincent Olivares MD

## 2019-09-25 NOTE — PROGRESS NOTES
09/25/19 0800   Team Meeting   Meeting Type Daily Rounds   Team Members Present   Team Members Present Physician;Nurse;; Other (Discipline and Name)   Physician Team Member Dr Marino Vail, RN; Johnathon Spears RN   Care Management Team Member Brenda Vizcarra   Other (Discipline and Name) Moisés [place]; SHANIKA Love     Patient presents preoccupied and somatic  Had podiatry appointment yesterday  Initially did not want to go  Attended [facility] yesterday  Slept

## 2019-09-25 NOTE — PROGRESS NOTES
Patient quite and reserved  Focused on not wanting to meet with [facility] representative who came to see her  Pt adamant about "not' wanting to share information about her stay on the [facility] with that particular person however, gave no explanation or reason why

## 2019-09-25 NOTE — NURSING NOTE
Patient visible on unit at intervals  Encouraged to participate in mileu and attend groups  Utilizing incentive spirometer as ordered  Offers no complaints  Disheveled appearance  Denies suicidal ideations  Admits feeling depressed rating it a 5 on 1-10 scale  Anxious affect  Remains on safe and fall precautions  Medication and meal compliant

## 2019-09-25 NOTE — PLAN OF CARE
Problem: Alteration in Thoughts and Perception  Goal: Agree to be compliant with medication regime, as prescribed and report medication side effects  Description  Interventions:  - Offer appropriate PRN medication and supervise ingestion; conduct aims, as needed   Outcome: Progressing     Problem: PAIN - ADULT  Goal: Verbalizes/displays adequate comfort level or baseline comfort level  Description  Interventions:  - Encourage patient to monitor pain and request assistance  - Assess pain using appropriate pain scale  - Administer analgesics based on type and severity of pain and evaluate response  - Implement non-pharmacological measures as appropriate and evaluate response  - Consider cultural and social influences on pain and pain management  - Notify physician/advanced practitioner if interventions unsuccessful or patient reports new pain  Outcome: Progressing     Problem: SAFETY ADULT  Goal: Patient will remain free of falls  Description  INTERVENTIONS:  - Assess patient frequently for physical needs  -  Identify cognitive and physical deficits and behaviors that affect risk of falls    -  [place] fall precautions as indicated by assessment   - Educate patient/family on patient safety including physical limitations  - Instruct patient to call for assistance with activity based on assessment  - Modify environment to reduce risk of injury  - Consider OT/PT consult to assist with strengthening/mobility  Outcome: Progressing     Problem: RESPIRATORY - ADULT  Goal: Achieves optimal ventilation and oxygenation  Description  INTERVENTIONS:  - Assess for changes in respiratory status  - Assess for changes in mentation and behavior  - Position to facilitate oxygenation and minimize respiratory effort  - Oxygen administration by appropriate delivery method based on oxygen saturation (per order) or ABGs  - Initiate smoking cessation education as indicated  - Encourage broncho-pulmonary hygiene including cough, deep breathe, Incentive Spirometry  - Assess the need for suctioning and aspirate as needed  - Assess and instruct to report SOB or any respiratory difficulty  - Respiratory Therapy support as indicated  Outcome: Progressing   The patient slept through the night with no behaviors or issues noted  Oxygen maintained at 1 liter via NC while in bed  No s/s respiratory distress noted  Fall precautions maintained  Med compliant  Continue to monitor

## 2019-09-26 RX ADMIN — TIOTROPIUM BROMIDE 18 MCG: 18 CAPSULE ORAL; RESPIRATORY (INHALATION) at 09:01

## 2019-09-26 RX ADMIN — CLOZAPINE 50 MG: 25 TABLET ORAL at 17:15

## 2019-09-26 RX ADMIN — FLUOXETINE 20 MG: 20 CAPSULE ORAL at 09:01

## 2019-09-26 RX ADMIN — CLOZAPINE 50 MG: 25 TABLET ORAL at 21:29

## 2019-09-26 RX ADMIN — LEVOTHYROXINE SODIUM 125 MCG: 125 TABLET ORAL at 06:09

## 2019-09-26 RX ADMIN — THEOPHYLLINE ANHYDROUS 200 MG: 200 CAPSULE, EXTENDED RELEASE ORAL at 09:01

## 2019-09-26 RX ADMIN — FLUTICASONE FUROATE AND VILANTEROL TRIFENATATE 1 PUFF: 200; 25 POWDER RESPIRATORY (INHALATION) at 09:01

## 2019-09-26 RX ADMIN — PANTOPRAZOLE SODIUM 40 MG: 40 TABLET, DELAYED RELEASE ORAL at 06:09

## 2019-09-26 RX ADMIN — MONTELUKAST SODIUM 10 MG: 10 TABLET, COATED ORAL at 21:29

## 2019-09-26 NOTE — ASSESSMENT & PLAN NOTE
Psychiatry Progress Note    Staff has not taken any showers since Monday and is now offered that she might choke as she felt that a piece of her hamburger was stuck in her throat but she did not talk at all but she is afraid she might in future  She continues to verbalize that she does not feel quite safe on the unit becoming psychosomatic claiming that she cannot breathe even though she does appear to breathe fine while observed  She is still  suspicious about certain staff who gives her medications like her inhalers and the spirometer off and on does individually examined all her medications before she takes them  Her group attendance attendance is still not that great   She was again reminded that  if she improves her group attendance and attends to her ADLs skills,  we can always look into discharging [facility]  She continues to present with stilted speech being too formal as if talking in a court of law which has not changed either  She does not admit to any overt hallucinations or paranoia but still appears to harbor some covert  paranoia based on her demeanor and interaction on the unit  She continues to believe that 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 and she believes it was implanted by surgeons a few years back for her own protection so they can monitor her but she states she has not too much worried about it at this time  No current suicidal homicidal thoughts intent or plans reported  No overt hallucinations or other delusions reported   No signs or sxs of agranulocytosis or myocarditis or endocarditis and she is moving her bowels so far with no issues  Patient was again reminded to attend to ADLs skills and take showers at least every other day and attend more groups     She had refused to talk to the [facility] representative did go and see the podiatrist yesterday  Current medications:    Current Facility-Administered Medications:     acetaminophen (TYLENOL) tablet 650 mg, 650 mg, Oral, Q6H PRN, Amina Aparicio MD    albuterol (PROVENTIL HFA,VENTOLIN HFA) inhaler 2 puff, 2 puff, Inhalation, Q4H PRN, Amina Aparicio MD, 2 puff at 07/25/19 1200    aluminum-magnesium hydroxide-simethicone (MYLANTA) 200-200-20 mg/5 mL oral suspension 15 mL, 15 mL, Oral, Q4H PRN, Amina Aparicio MD    ammonium lactate (LAC-HYDRIN) 12 % lotion 1 application, 1 application, Topical, BID PRN, Amina Aparicio MD    benzonatate (TESSALON PERLES) capsule 100 mg, 100 mg, Oral, TID PRN, Amina Aparicio MD    benztropine (COGENTIN) injection 1 mg, 1 mg, Intramuscular, Q8H PRN, Amina Aparicio MD    cloZAPine (CLOZARIL) tablet 50 mg, 50 mg, Oral, BID, Amina Aparicio MD, 50 mg at 09/25/19 2140    EPINEPHrine PF (ADRENALIN) 1 mg/mL injection 0 15 mg, 0 15 mg, Intramuscular, Once PRN, Amina Aparicio MD    FLUoxetine (PROzac) capsule 20 mg, 20 mg, Oral, Daily, Amina Aparicio MD, 20 mg at 09/26/19 0901    fluticasone-vilanterol (BREO ELLIPTA) 200-25 MCG/INH inhaler 1 puff, 1 puff, Inhalation, Daily, Amina Aparicio MD, 1 puff at 09/26/19 0901    ketotifen (ZADITOR) 0 025 % ophthalmic solution 1 drop, 1 drop, Right Eye, BID PRN, Amina Aparicio MD    levothyroxine tablet 125 mcg, 125 mcg, Oral, Early Morning, Amina Aparicio MD, 125 mcg at 09/26/19 0609    magnesium hydroxide (MILK OF MAGNESIA) 400 mg/5 mL oral suspension 30 mL, 30 mL, Oral, Daily PRN, Amina Aparicio MD    montelukast (SINGULAIR) tablet 10 mg, 10 mg, Oral, HS, Amina Aparicio MD, 10 mg at 09/25/19 2140    OLANZapine (ZyPREXA) IM injection 5 mg, 5 mg, Intramuscular, Q8H PRN, Amina Aparicio MD    OLANZapine (ZyPREXA) tablet 5 mg, 5 mg, Oral, Q8H PRN, Amina Apaircio MD    pantoprazole (PROTONIX) EC tablet 40 mg, 40 mg, Oral, Early Morning, Amina Aparicio MD, 40 mg at 09/26/19 0609    polyethylene glycol (MIRALAX) packet 17 g, 17 g, Oral, Daily PRN, Amina Aparicio MD    polyvinyl alcohol (LIQUIFILM TEARS) 1 4 % ophthalmic solution 1 drop, 1 drop, Both Eyes, Q3H PRN, Vincent Olivares MD    theophylline (JEF-24) 24 hr capsule 200 mg, 200 mg, Oral, Daily, Vincent Olivares MD, 200 mg at 09/26/19 0901    tiotropium (SPIRIVA) capsule for inhaler 18 mcg, 18 mcg, Inhalation, Daily, Vincent Olivares MD, 18 mcg at 09/26/19 0901    traZODone (DESYREL) tablet 25 mg, 25 mg, Oral, HS PRN, Vincent Olivares MD  Justification if on more than two antipsychotics:  Only on his clozapine  Side effects if any:  None    Risks , benefits, side effects and precautions of medications discussed with patient and reminded patient to let us know any problems with medications     Objective:     Vital Signs:  Vitals:    09/25/19 1949 09/25/19 2130 09/26/19 0730 09/26/19 0732   BP: 106/70  102/65 99/68   BP Location: Left arm  Left arm Left arm   Pulse: 78  89 99   Resp: 16  18    Temp: (!) 97 °F (36 1 °C)  97 9 °F (36 6 °C)    TempSrc: Temporal  Temporal    SpO2: 93% 92%     Weight:       Height:         Appearance:  age appropriate, casually dressed and overweight older than stated age   Behavior:  deviant, evasive and guarded and suspicious but with good eye contact   Speech:  normal pitch and normal volume but tends to become loud at times   Mood:  anxious and dysthymic   Affect:  mood-congruent   Thought Process:  goal directed and illogical slightly pressured but able to be redirected and talks as if in a court of low being stilted   Thought Content:  delusions  grandiose and somatic about not being able to breathe despite being on nasal oxygen and paranoid about people out to harm her and Atrovent inhaler tainteded with peanut oil  No current suicidal homicidal thoughts intent or plans reported    No phobias obsessions or compulsions reported   Perceptual Disturbances: None and does not appear responding to internal stimuli   Risk Potential: Tendency to not care for herself if she goes off treatment   Sensorium:  person, place, time/date, situation, day of week, month of year and time   Cognition:  grossly intact   Consciousness:  alert and awake    Attention: Intact concentration and attention span   Intellect: Considered to be at least of average intelligence   Insight:  limited in denial   Judgment: poor      Motor Activity: no abnormal movements         Recent Labs:  Results Reviewed     None          I/O Past 24 hours:  No intake/output data recorded  No intake/output data recorded  Assessment / Plan:     Schizoaffective disorder, bipolar type ([facility] [address] )      Reason for continued inpatient care:  Because of underlying paranoia and refusal to go back to the personal care home and inability to care for herself on her own  Acceptance by patient:  Accepting  Eric Cevallos in recovery:  About living in another personal care home once she feels better  Understanding of medications :  Has some understanding  Involved in reintegration process:  Adjusting to the unit  Trusting in relatoinship with psychiatrist:  Trusting    Recommended Treatment:    Medication changes:  1) no changes today  Non-pharmacological treatments  1) continue with  individual therapy group therapy, milieu therapy and occupational therapy and milieu therapy involving multidisciplinary team approach with psychotherapist, case management, nursing, peer support services, art therapist etc using recovery principles and psycho-education about accepting illness and need for treatment   3) encourage to attend to ADLs like taking showers and wearing clean clothes   4) Encourage to attend groups   Safety  1) Safety/communication plan established targeting dynamic risk factors above  Discharge Plan most likely back to the a:  Personal care boarding home with act services once her delusions are managed and mood becomes more stabilized and she becomes more receptive to treatment compliance    Counseling / Coordination of Care    Total floor / unit time spent today 15 minutes   Greater than 50% of total time was spent with the patient and / or family counseling and / or coordination of care  A description of the counseling / coordination of care  Patient's Rights, confidentiality and exceptions to confidentiality, use of automated medical record, Jeb Patrick staff access to medical record, and consent to treatment reviewed      Jerome Lopez MD

## 2019-09-26 NOTE — PROGRESS NOTES
Progress Note - [employer] 1955, 60 y o  female MRN: [id number]    [facility] Encounter: [id number]    Primary Care Provider: Chelita Lua MD   Date and time admitted to hospital: 7/23/2019  5:30 PM        * Schizoaffective disorder, bipolar type [facility])  Assessment & Plan  Psychiatry Progress Note    Patient has not taken any showers since Monday and is now offered that she might choke as she felt that a piece of her hamburger was stuck in her throat but she did not talk at all but she is afraid she might in future  She continues to verbalize that she does not feel quite safe on the unit becoming psychosomatic claiming that she cannot breathe even though she does appear to breathe fine while observed  She is still  suspicious about certain staff who gives her medications like her inhalers and the spirometer off and on does individually examined all her medications before she takes them  Her group attendance attendance is still not that great   She was again reminded that  if she improves her group attendance and attends to her ADLs skills,  we can always look into discharging her back [facility] [place] [facility]  She continues to present with stilted speech being too formal as if talking in a court of law which has not changed either  She does not admit to any overt hallucinations or paranoia but still appears to harbor some covert  paranoia based on her demeanor and interaction on the unit  She continues to believe that 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 and she believes it was implanted by surgeons a few years back for her own protection so they can monitor her but she states she has not too much worried about it at this time  No current suicidal homicidal thoughts intent or plans reported  No overt hallucinations or other delusions reported    No signs or sxs of agranulocytosis or myocarditis or endocarditis and she is moving her bowels so far with no issues  Patient was again reminded to attend to ADLs skills and take showers at least every other day and attend more groups     She had refused to talk to the [facility] representative did go and see the podiatrist yesterday  Current medications:    Current Facility-Administered Medications:     acetaminophen (TYLENOL) tablet 650 mg, 650 mg, Oral, Q6H PRN, Zander Yanez MD    albuterol (PROVENTIL HFA,VENTOLIN HFA) inhaler 2 puff, 2 puff, Inhalation, Q4H PRN, Zander Yanez MD, 2 puff at 07/25/19 1200    aluminum-magnesium hydroxide-simethicone (MYLANTA) 200-200-20 mg/5 mL oral suspension 15 mL, 15 mL, Oral, Q4H PRN, Zander Yanez MD    ammonium lactate (LAC-HYDRIN) 12 % lotion 1 application, 1 application, Topical, BID PRN, Zander Yanez MD    benzonatate (TESSALON PERLES) capsule 100 mg, 100 mg, Oral, TID PRN, Zander Yanez MD    benztropine (COGENTIN) injection 1 mg, 1 mg, Intramuscular, Q8H PRN, Zander Yanez MD    cloZAPine (CLOZARIL) tablet 50 mg, 50 mg, Oral, BID, Zander Yanez MD, 50 mg at 09/25/19 2140    EPINEPHrine PF (ADRENALIN) 1 mg/mL injection 0 15 mg, 0 15 mg, Intramuscular, Once PRN, Zander Yanez MD    FLUoxetine (PROzac) capsule 20 mg, 20 mg, Oral, Daily, Zander Yanez MD, 20 mg at 09/26/19 0901    fluticasone-vilanterol (BREO ELLIPTA) 200-25 MCG/INH inhaler 1 puff, 1 puff, Inhalation, Daily, Zander Yanez MD, 1 puff at 09/26/19 0901    ketotifen (ZADITOR) 0 025 % ophthalmic solution 1 drop, 1 drop, Right Eye, BID PRN, Zander Yanez MD    levothyroxine tablet 125 mcg, 125 mcg, Oral, Early Morning, Zander Yanez MD, 125 mcg at 09/26/19 0609    magnesium hydroxide (MILK OF MAGNESIA) 400 mg/5 mL oral suspension 30 mL, 30 mL, Oral, Daily PRN, Zander Yanez MD    montelukast (SINGULAIR) tablet 10 mg, 10 mg, Oral, HS, Zander Yanez MD, 10 mg at 09/25/19 2140    OLANZapine (ZyPREXA) IM injection 5 mg, 5 mg, Intramuscular, Q8H PRN, Zander Yanez MD    OLANZapine (ZyPREXA) tablet 5 mg, 5 mg, Oral, Q8H PRN, Greer Beltran MD    pantoprazole (PROTONIX) EC tablet 40 mg, 40 mg, Oral, Early Morning, Greer Beltran MD, 40 mg at 09/26/19 0609    polyethylene glycol (MIRALAX) packet 17 g, 17 g, Oral, Daily PRN, Greer Beltran MD    polyvinyl alcohol (LIQUIFILM TEARS) 1 4 % ophthalmic solution 1 drop, 1 drop, Both Eyes, Q3H PRN, Greer Beltran MD    theophylline (JEF-24) 24 hr capsule 200 mg, 200 mg, Oral, Daily, Greer Beltran MD, 200 mg at 09/26/19 0901    tiotropium (SPIRIVA) capsule for inhaler 18 mcg, 18 mcg, Inhalation, Daily, Greer Beltran MD, 18 mcg at 09/26/19 0901    traZODone (DESYREL) tablet 25 mg, 25 mg, Oral, HS PRN, Greer Beltran MD  Justification if on more than two antipsychotics:  Only on his clozapine  Side effects if any:  None    Risks , benefits, side effects and precautions of medications discussed with patient and reminded patient to let us know any problems with medications     Objective:     Vital Signs:  Vitals:    09/25/19 1949 09/25/19 2130 09/26/19 0730 09/26/19 0732   BP: 106/70  102/65 99/68   BP Location: Left arm  Left arm Left arm   Pulse: 78  89 99   Resp: 16  18    Temp: (!) 97 °F (36 1 °C)  97 9 °F (36 6 °C)    TempSrc: Temporal  Temporal    SpO2: 93% 92%     Weight:       Height:         Appearance:  age appropriate, casually dressed and overweight older than stated age   Behavior:  deviant, evasive and guarded and suspicious but with good eye contact   Speech:  normal pitch and normal volume but tends to become loud at times   Mood:  anxious and dysthymic   Affect:  mood-congruent   Thought Process:  goal directed and illogical slightly pressured but able to be redirected and talks as if in a court of low being stilted   Thought Content:  delusions  grandiose and somatic about not being able to breathe despite being on nasal oxygen and paranoid about people out to harm her and Atrovent inhaler tainteded with peanut oil    No current suicidal homicidal thoughts intent or plans reported  No phobias obsessions or compulsions reported   Perceptual Disturbances: None and does not appear responding to internal stimuli   Risk Potential: Tendency to not care for herself if she goes off treatment   Sensorium:  person, place, time/date, situation, day of week, month of year and time   Cognition:  grossly intact   Consciousness:  alert and awake    Attention: Intact concentration and attention span   Intellect: Considered to be at least of average intelligence   Insight:  limited in denial   Judgment: poor      Motor Activity: no abnormal movements         Recent Labs:  Results Reviewed     None          I/O Past 24 hours:  No intake/output data recorded  No intake/output data recorded  Assessment / Plan:     Schizoaffective disorder, bipolar type ([facility] [address] )      Reason for continued inpatient care:  Because of underlying paranoia and refusal to go back to the personal care home and inability to care for herself on her own  Acceptance by patient:  Accepting  Mateusz Mcghee in recovery:  About living in another personal care home once she feels better  Understanding of medications :  Has some understanding  Involved in reintegration process:  Adjusting to the unit  Trusting in relatoinship with psychiatrist:  Trusting    Recommended Treatment:    Medication changes:  1) no changes today  Non-pharmacological treatments  1) continue with  individual therapy group therapy, milieu therapy and occupational therapy and milieu therapy involving multidisciplinary team approach with psychotherapist, case management, nursing, peer support services, art therapist etc using recovery principles and psycho-education about accepting illness and need for treatment   3) encourage to attend to ADLs like taking showers and wearing clean clothes   4) Encourage to attend groups   Safety  1) Safety/communication plan established targeting dynamic risk factors above    Discharge Plan most likely back to the a: Personal care boarding home with act services once her delusions are managed and mood becomes more stabilized and she becomes more receptive to treatment compliance    Counseling / Coordination of Care    Total floor / unit time spent today 15 minutes  Greater than 50% of total time was spent with the patient and / or family counseling and / or coordination of care  A description of the counseling / coordination of care  Patient's Rights, confidentiality and exceptions to confidentiality, use of automated medical record, [facility] staff access to medical record, and consent to treatment reviewed      Ivonne Nevarez MD

## 2019-09-26 NOTE — PROGRESS NOTES
[address] did have an HS snack, came up for her HS medicine  Continues somatic w/fears of choking on her food, saying this is why ate poorly today, though, snacks have been substantial (banana, chips, 2 cheeses, soda for HS snack)  Wearing her QHS humidified nasal O2 @ 1L for bed

## 2019-09-26 NOTE — PLAN OF CARE
Problem: Alteration in Thoughts and Perception  Goal: Agree to be compliant with medication regime, as prescribed and report medication side effects  Description  Interventions:  - Offer appropriate PRN medication and supervise ingestion; conduct aims, as needed   Outcome: Progressing  Goal: Attend and participate in unit activities, including therapeutic, recreational, and educational groups  Description  Interventions:  - Provide therapeutic and educational activities daily, encourage attendance and participation, and document same in the medical record     CERTIFIED PEER SPECIALIST INTERVENTIONS:    Complete peer assessment with patient to assess their needs and identify their goals to complete while in the recovery program as well as once discharged into the community  Patient will complete WRAP Plan, Crisis Plan and 5 Life Domains  Patient will attend 50% of groups offered on the unit  Patient will complete a goal card weekly  Outcome: Progressing     Problem: Alteration in Thoughts and Perception  Goal: Complete daily ADLs, including personal hygiene independently, as able  Description  Interventions:  - Observe, teach, and assist patient with ADLS  - Monitor and promote a balance of rest/activity, with adequate nutrition and elimination Problem: Depression  Goal: Refrain from isolation  Description  Interventions:  - Develop a trusting relationship   - Encourage socialization   Outcome: Not Progressing     2015 Oscar Duncan continues to isolate in room in bed in free time  Came out for meal; ate only 25%  Has come up on own for supper medicine  She is cheerful w/staff  Voices intention to follow through tonight w/attending PM Group & has kept her word; taking part now in group  Did use the Incentive Spirometer achieving a new high for her, 1250ml for 3 consecutive breaths  Has not made any effort @ hygiene or doing any [place] walking  Did stay out in dining room after meal about 30minutes

## 2019-09-26 NOTE — PLAN OF CARE
Problem: PAIN - ADULT  Goal: Verbalizes/displays adequate comfort level or baseline comfort level  Description  Interventions:  - Encourage patient to monitor pain and request assistance  - Assess pain using appropriate pain scale  - Administer analgesics based on type and severity of pain and evaluate response  - Implement non-pharmacological measures as appropriate and evaluate response  - Consider cultural and social influences on pain and pain management  - Notify physician/advanced practitioner if interventions unsuccessful or patient reports new pain  Outcome: Progressing     Problem: SAFETY ADULT  Goal: Patient will remain free of falls  Description  INTERVENTIONS:  - Assess patient frequently for physical needs  -  Identify cognitive and physical deficits and behaviors that affect risk of falls  -  [place] fall precautions as indicated by assessment   - Educate patient/family on patient safety including physical limitations  - Instruct patient to call for assistance with activity based on assessment  - Modify environment to reduce risk of injury  - Consider OT/PT consult to assist with strengthening/mobility  Outcome: Progressing     Problem: RESPIRATORY - ADULT  Goal: Achieves optimal ventilation and oxygenation  Description  INTERVENTIONS:  - Assess for changes in respiratory status  - Assess for changes in mentation and behavior  - Position to facilitate oxygenation and minimize respiratory effort  - Oxygen administration by appropriate delivery method based on oxygen saturation (per order) or ABGs  - Initiate smoking cessation education as indicated  - Encourage broncho-pulmonary hygiene including cough, deep breathe, Incentive Spirometry  - Assess the need for suctioning and aspirate as needed  - Assess and instruct to report SOB or any respiratory difficulty  - Respiratory Therapy support as indicated  Outcome: Progressing   The patient slept through the night with no behaviors or issues noted  Oxygen maintained at 1 liter via NC while in bed  No s/s respiratory distress noted  Fall precautions maintained  Med compliant  Continue to monitor

## 2019-09-26 NOTE — PROGRESS NOTES
09/26/19 0900   Team Meeting   Meeting Type Daily Rounds   Team Members Present   Team Members Present Physician;Nurse;; Other (Discipline and Name)   Physician Team Member Dr Brisa Arana Team Member Tony vargas RN   Care Management Team Member Brenda Baron   Other (Discipline and Name) Damon Beaum[place], [place]; Cleven Jeans, CPS     Patient presents somatic and isolative  Reports she is afraid she is going to choke on food  Patient was in bed all day  Slept

## 2019-09-26 NOTE — PLAN OF CARE
Problem: Alteration in Thoughts and Perception  Goal: Agree to be compliant with medication regime, as prescribed and report medication side effects  Description  Interventions:  - Offer appropriate PRN medication and supervise ingestion; conduct aims, as needed   Outcome: Progressing  Goal: Attend and participate in unit activities, including therapeutic, recreational, and educational groups  Description  Interventions:  - Provide therapeutic and educational activities daily, encourage attendance and participation, and document same in the medical record     CERTIFIED PEER SPECIALIST INTERVENTIONS:    Complete peer assessment with patient to assess their needs and identify their goals to complete while in the recovery program as well as once discharged into the community  Patient will complete WRAP Plan, Crisis Plan and 5 Life Domains  Patient will attend 50% of groups offered on the unit  Patient will complete a goal card weekly  Outcome: Progressing  Goal: Complete daily ADLs, including personal hygiene independently, as able  Description  Interventions:  - Observe, teach, and assist patient with ADLS  - Monitor and promote a balance of rest/activity, with adequate nutrition and elimination   Outcome: Progressing   Patient is pleasant and cooperative this shift  She is mostly isolative to her room  Somatic at times regarding not being able to swallow, she has had swallow evaluations multiple times and each times has shown she is able to swallow effectively and without issue  She seems to c/o this when she does not want to eat the food that is on her tray  She eats all snacks including chips and peanut butter crackers without any problem  Continues to refuse to shower even though she was reminded that she has not done any hygiene for 3 days  Will continue to monitor  Launch [employer] and print the 'Prescriptions from this Visit' Report

## 2019-09-27 LAB — GLUCOSE SERPL-MCNC: 92 MG/DL (ref 65–140)

## 2019-09-27 PROCEDURE — 82948 REAGENT STRIP/BLOOD GLUCOSE: CPT

## 2019-09-27 RX ADMIN — THEOPHYLLINE ANHYDROUS 200 MG: 200 CAPSULE, EXTENDED RELEASE ORAL at 09:04

## 2019-09-27 RX ADMIN — CLOZAPINE 50 MG: 25 TABLET ORAL at 21:40

## 2019-09-27 RX ADMIN — CLOZAPINE 50 MG: 25 TABLET ORAL at 17:54

## 2019-09-27 RX ADMIN — FLUOXETINE 20 MG: 20 CAPSULE ORAL at 09:04

## 2019-09-27 RX ADMIN — MONTELUKAST SODIUM 10 MG: 10 TABLET, COATED ORAL at 21:40

## 2019-09-27 RX ADMIN — TIOTROPIUM BROMIDE 18 MCG: 18 CAPSULE ORAL; RESPIRATORY (INHALATION) at 09:04

## 2019-09-27 RX ADMIN — LEVOTHYROXINE SODIUM 125 MCG: 125 TABLET ORAL at 06:08

## 2019-09-27 RX ADMIN — FLUTICASONE FUROATE AND VILANTEROL TRIFENATATE 1 PUFF: 200; 25 POWDER RESPIRATORY (INHALATION) at 09:04

## 2019-09-27 RX ADMIN — PANTOPRAZOLE SODIUM 40 MG: 40 TABLET, DELAYED RELEASE ORAL at 06:08

## 2019-09-27 NOTE — PLAN OF CARE
Problem: PAIN - ADULT  Goal: Verbalizes/displays adequate comfort level or baseline comfort level  Description  Interventions:  - Encourage patient to monitor pain and request assistance  - Assess pain using appropriate pain scale  - Administer analgesics based on type and severity of pain and evaluate response  - Implement non-pharmacological measures as appropriate and evaluate response  - Consider cultural and social influences on pain and pain management  - Notify physician/advanced practitioner if interventions unsuccessful or patient reports new pain  Outcome: Progressing     Problem: SAFETY ADULT  Goal: Patient will remain free of falls  Description  INTERVENTIONS:  - Assess patient frequently for physical needs  -  Identify cognitive and physical deficits and behaviors that affect risk of falls  -  [place] fall precautions as indicated by assessment   - Educate patient/family on patient safety including physical limitations  - Instruct patient to call for assistance with activity based on assessment  - Modify environment to reduce risk of injury  - Consider OT/PT consult to assist with strengthening/mobility  Outcome: Progressing     Problem: RESPIRATORY - ADULT  Goal: Achieves optimal ventilation and oxygenation  Description  INTERVENTIONS:  - Assess for changes in respiratory status  - Assess for changes in mentation and behavior  - Position to facilitate oxygenation and minimize respiratory effort  - Oxygen administration by appropriate delivery method based on oxygen saturation (per order) or ABGs  - Initiate smoking cessation education as indicated  - Encourage broncho-pulmonary hygiene including cough, deep breathe, Incentive Spirometry  - Assess the need for suctioning and aspirate as needed  - Assess and instruct to report SOB or any respiratory difficulty  - Respiratory Therapy support as indicated  Outcome: Progressing   Patient slept mostly through the night    Patient did wake up at 4a asking for her blood sugar to be checked  Blood sugar was noted to be 92, patient asks, "does that mean I don't need juice"  Staff told her she did not need it and patient went back to bed and has been sleeping soundly since  O2 maintained at 1L while in bed with no signs of respiratory distress  Patient with no behaviors noted  Will continue to monitor

## 2019-09-27 NOTE — PLAN OF CARE
Problem: Risk for Self Injury/Neglect  Goal: Refrain from harming self  Description  Interventions:  - Monitor patient closely, per order  - Develop a trusting relationship  - Supervise medication ingestion, monitor effects and side effects   Outcome: Progressing     Problem: Alteration in Orientation  Goal: Interact with staff daily  Description  Interventions:  - Assess and re-assess patient's level of orientation  - Engage patient in 1 on 1 interactions, daily, for a minimum of 15 minutes   - Establish rapport/trust with patient   Outcome: Progressing     Problem: SAFETY ADULT  Goal: Patient will remain free of falls  Description  INTERVENTIONS:  - Assess patient frequently for physical needs  -  Identify cognitive and physical deficits and behaviors that affect risk of falls    -  [place] fall precautions as indicated by assessment   - Educate patient/family on patient safety including physical limitations  - Instruct patient to call for assistance with activity based on assessment  - Modify environment to reduce risk of injury  - Consider OT/PT consult to assist with strengthening/mobility  Outcome: Progressing     Problem: RESPIRATORY - ADULT  Goal: Achieves optimal ventilation and oxygenation  Description  INTERVENTIONS:  - Assess for changes in respiratory status  - Assess for changes in mentation and behavior  - Position to facilitate oxygenation and minimize respiratory effort  - Oxygen administration by appropriate delivery method based on oxygen saturation (per order) or ABGs  - Initiate smoking cessation education as indicated  - Encourage broncho-pulmonary hygiene including cough, deep breathe, Incentive Spirometry  - Assess the need for suctioning and aspirate as needed  - Assess and instruct to report SOB or any respiratory difficulty  - Respiratory Therapy support as indicated  Outcome: Progressing     Problem: Alteration in Thoughts and Perception  Goal: Attend and participate in unit activities, including therapeutic, recreational, and educational groups  Description  Interventions:  - Provide therapeutic and educational activities daily, encourage attendance and participation, and document same in the medical record     CERTIFIED PEER SPECIALIST INTERVENTIONS:    Complete peer assessment with patient to assess their needs and identify their goals to complete while in the recovery program as well as once discharged into the community  Patient will complete WRAP Plan, Crisis Plan and 5 Life Domains  Patient will attend 50% of groups offered on the unit  Patient will complete a goal card weekly  Outcome: Not Progressing  Goal: Complete daily ADLs, including personal hygiene independently, as able  Description  Interventions:  - Observe, teach, and assist patient with ADLS  - Monitor and promote a balance of rest/activity, with adequate nutrition and elimination   Outcome: Not Progressing     Problem: Depression  Goal: Refrain from isolation  Description  Interventions:  - Develop a trusting relationship   - Encourage socialization   Outcome: Not Progressing  Goal: Refrain from self-neglect  Outcome: Not Bird Valle has been in her room most of the shift  Quiet and keeps to herself most of the time  Incentive spirometer was done with supervision  She did well and was able to do 10 breaths and get to 1250 ml's  No complaint of shortness of breath  Napping at times  Prompted for medication  Ate 100% of her meal  Did not attend groups  No shower or BM  Disheveled appearance  Ate snack  Took HS medication after snack  Oxygen saturation 93% on room air before O2 1 liter nasal cannula applied  Continue to monitor  Precautions maintained

## 2019-09-27 NOTE — PROGRESS NOTES
09/27/19 1100   Activity/Group Checklist   Group Other (Comment)  (IMR - Weekly Goal Review)   Attendance Attended   Attendance Duration (min) 31-45   Interactions Interacted appropriately   Affect/Mood Appropriate   Goals Achieved Able to listen to others; Able to engage in interactions; Able to reflect/comment on own behavior;Able to manage/cope with feelings;Verbalized increased hopefulness; Able to self-disclose; Able to recieve feedback     Patient did well in today's group and discussed her progress towards goals  Patient reports her weekly goals were to 1) Take showers 2) Drink more water and 3) continue going to groups  Her progress towards these goals is confirmed in nursing documentation

## 2019-09-27 NOTE — PLAN OF CARE
Problem: Alteration in Thoughts and Perception  Goal: Verbalize thoughts and feelings  Description  Interventions:  - Promote a nonjudgmental and trusting relationship with the patient through active listening and therapeutic communication  - Assess patient's level of functioning, behavior and potential for risk  - Engage patient in 1 on 1 interactions for a minimum of 15 minutes each session  - Encourage patient to express fears, feelings, frustrations, and discuss symptoms    - [place] patient to reality, help patient recognize reality-based thinking   - Administer medications as ordered and assess for potential side effects  - Provide the patient education related to the signs and symptoms of the illness and desired effects of prescribed medications  Outcome: Progressing  Goal: Agree to be compliant with medication regime, as prescribed and report medication side effects  Description  Interventions:  - Offer appropriate PRN medication and supervise ingestion; conduct aims, as needed   Outcome: Progressing  Goal: Attend and participate in unit activities, including therapeutic, recreational, and educational groups  Description  Interventions:  - Provide therapeutic and educational activities daily, encourage attendance and participation, and document same in the medical record     CERTIFIED PEER SPECIALIST INTERVENTIONS:    Complete peer assessment with patient to assess their needs and identify their goals to complete while in the recovery program as well as once discharged into the community  Patient will complete WRAP Plan, Crisis Plan and 5 Life Domains  Patient will attend 50% of groups offered on the unit  Patient will complete a goal card weekly      Outcome: Progressing  Goal: Complete daily ADLs, including personal hygiene independently, as able  Description  Interventions:  - Observe, teach, and assist patient with ADLS  - Monitor and promote a balance of rest/activity, with adequate nutrition and elimination   Outcome: Not Progressing     Problem: Depression  Goal: Refrain from isolation  Description  Interventions:  - Develop a trusting relationship   - Encourage socialization   Outcome: Michelle Scott has been intermittently visible, less somatically occupied and stated that she feels less anxious  CBC clotted as reported by the lab, will attempt later today or tomorrow  In between select groups she isolates to her room and naps throughout the day  Her poor hygiene has been addressed yet she did not tend to her hygiene today  Behaviors remain controlled and appropriate  Currently in her room  Will continue to monitor

## 2019-09-27 NOTE — ASSESSMENT & PLAN NOTE
Psychiatry Progress Note    Patient is still refusing to take showers claiming that water is to call for her  She again was laying back on bed and did not attend any groups yesterday despite letting the staff know that she will  She continues to verbalize that she does not feel quite safe on the unit and become psychosomatic claiming that she cannot breathe even though she does appear to breathe fine while observed  She is still  suspicious about certain staff who gives her medications like her inhalers and the spirometer off and on     Her group attendance attendance is still not that great   She was again reminded that  if she improves her group attendance and attends to her ADLs skills,  we can always look into discharging her back to the [facility] home again  She continues to present with stilted speech being too formal as if talking in a court of law which has not changed either  She does not admit to any overt hallucinations or paranoia but still appears to harbor some covert  paranoia based on her demeanor and interaction on the unit  She tells me she is not much bothered about the micro chip on her arm she does not talk about it unless brought up  No current suicidal homicidal thoughts intent or plans reported  No overt hallucinations or other delusions reported   No signs or sxs of agranulocytosis or myocarditis or endocarditis and she is moving her bowels so far with no issues  Patient was again reminded to attend to ADLs skills and take showers at least every other day and attend more groups     She has long fingernails and I did remind her to keep them clean or have them look dirty  Current medications:    Current Facility-Administered Medications:     acetaminophen (TYLENOL) tablet 650 mg, 650 mg, Oral, Q6H PRN, Jeet Levine MD    albuterol (PROVENTIL HFA,VENTOLIN HFA) inhaler 2 puff, 2 puff, Inhalation, Q4H PRN, Jeet Levine MD, 2 puff at 07/25/19 1200   aluminum-magnesium hydroxide-simethicone (MYLANTA) 200-200-20 mg/5 mL oral suspension 15 mL, 15 mL, Oral, Q4H PRN, Teri Huggins MD    ammonium lactate (LAC-HYDRIN) 12 % lotion 1 application, 1 application, Topical, BID PRN, Teri Huggins MD    benzonatate (TESSALON PERLES) capsule 100 mg, 100 mg, Oral, TID PRN, Teri Huggins MD    benztropine (COGENTIN) injection 1 mg, 1 mg, Intramuscular, Q8H PRN, Teri Huggins MD    cloZAPine (CLOZARIL) tablet 50 mg, 50 mg, Oral, BID, Teri Huggins MD, 50 mg at 09/26/19 2129    EPINEPHrine PF (ADRENALIN) 1 mg/mL injection 0 15 mg, 0 15 mg, Intramuscular, Once PRN, Teri Huggins MD    FLUoxetine (PROzac) capsule 20 mg, 20 mg, Oral, Daily, Teri Huggins MD, 20 mg at 09/27/19 0904    fluticasone-vilanterol (BREO ELLIPTA) 200-25 MCG/INH inhaler 1 puff, 1 puff, Inhalation, Daily, Teri Huggins MD, 1 puff at 09/27/19 0904    ketotifen (ZADITOR) 0 025 % ophthalmic solution 1 drop, 1 drop, Right Eye, BID PRN, Teri Huggins MD    levothyroxine tablet 125 mcg, 125 mcg, Oral, Early Morning, Teri Huggins MD, 125 mcg at 09/27/19 0608    magnesium hydroxide (MILK OF MAGNESIA) 400 mg/5 mL oral suspension 30 mL, 30 mL, Oral, Daily PRN, Teri Huggins MD    montelukast (SINGULAIR) tablet 10 mg, 10 mg, Oral, HS, Teri Huggins MD, 10 mg at 09/26/19 2129    OLANZapine (ZyPREXA) IM injection 5 mg, 5 mg, Intramuscular, Q8H PRN, Teri Huggins MD    OLANZapine (ZyPREXA) tablet 5 mg, 5 mg, Oral, Q8H PRN, Teri Huggins MD    pantoprazole (PROTONIX) EC tablet 40 mg, 40 mg, Oral, Early Morning, Teri Huggins MD, 40 mg at 09/27/19 0608    polyethylene glycol (MIRALAX) packet 17 g, 17 g, Oral, Daily PRN, Teri Huggins MD    polyvinyl alcohol (LIQUIFILM TEARS) 1 4 % ophthalmic solution 1 drop, 1 drop, Both Eyes, Q3H PRN, Teri Huggins MD    theophylline (JEF-24) 24 hr capsule 200 mg, 200 mg, Oral, Daily, Teri Huggins MD, 200 mg at 09/27/19 0904    tiotropium (SPIRIVA) capsule for inhaler 18 mcg, 18 mcg, Inhalation, Daily, Allie Guzman MD, 18 mcg at 09/27/19 1693    traZODone (DESYREL) tablet 25 mg, 25 mg, Oral, HS PRN, Allie Guzman MD  Justification if on more than two antipsychotics:  Only on his clozapine  Side effects if any:  None    Risks , benefits, side effects and precautions of medications discussed with patient and reminded patient to let us know any problems with medications     Objective:     Vital Signs:  Vitals:    09/26/19 1900 09/26/19 2136 09/27/19 0730 09/27/19 0732   BP: 101/63  108/56 99/57   BP Location: Left arm  Left arm Left arm   Pulse: 91  99 103   Resp: 16  18    Temp: 97 7 °F (36 5 °C)  98 5 °F (36 9 °C)    TempSrc: Temporal  Temporal    SpO2: 93% 93%     Weight:       Height:         Appearance:  age appropriate, casually dressed and overweight older than stated age   Behavior:  deviant, evasive and guarded and suspicious but with good eye contact   Speech:  normal pitch and normal volume but tends to become loud at times   Mood:  anxious and dysthymic   Affect:  mood-congruent   Thought Process:  goal directed and illogical slightly pressured but able to be redirected and talks as if in a court of low being stilted   Thought Content:  delusions  grandiose and somatic about not being able to breathe despite being on nasal oxygen and paranoid about people out to harm her and Atrovent inhaler tainteded with peanut oil  No current suicidal homicidal thoughts intent or plans reported    No phobias obsessions or compulsions reported   Perceptual Disturbances: None and does not appear responding to internal stimuli   Risk Potential: Tendency to not care for herself if she goes off treatment   Sensorium:  person, place, time/date, situation, day of week, month of year and time   Cognition:  grossly intact   Consciousness:  alert and awake    Attention: Intact concentration and attention span   Intellect: Considered to be at least of average intelligence   Insight:  limited in denial Judgment: poor      Motor Activity: no abnormal movements         Recent Labs:  Results Reviewed     None          I/O Past 24 hours:  No intake/output data recorded  No intake/output data recorded  Assessment / Plan:     Schizoaffective disorder, bipolar type ([address] )      Reason for continued inpatient care:  Because of underlying paranoia and refusal to go back to the personal care home and inability to care for herself on her own  Acceptance by patient:  Accepting  Nithin Serra in recovery:  About living in another personal care home once she feels better  Understanding of medications :  Has some understanding  Involved in reintegration process:  Adjusting to the unit  Trusting in relatoinship with psychiatrist:  Trusting    Recommended Treatment:    Medication changes:  1) no changes today  Non-pharmacological treatments  1) continue with  individual therapy group therapy, milieu therapy and occupational therapy and milieu therapy involving multidisciplinary team approach with psychotherapist, case management, nursing, peer support services, art therapist etc using recovery principles and psycho-education about accepting illness and need for treatment   3) encourage to attend to ADLs like taking showers and wearing clean clothes   4) Encourage to attend groups   Safety  1) Safety/communication plan established targeting dynamic risk factors above  Discharge Plan most likely back to the a:  Personal care boarding home with [facility] services once her delusions are managed and mood becomes more stabilized and she becomes more receptive to treatment compliance    Counseling / Coordination of Care    Total floor / unit time spent today 15 minutes  Greater than 50% of total time was spent with the patient and / or family counseling and / or coordination of care  A description of the counseling / coordination of care       Patient's Rights, confidentiality and exceptions to confidentiality, use of automated medical record, [facility] staff access to medical record, and consent to treatment reviewed      Shaggy Rangel MD

## 2019-09-27 NOTE — PROGRESS NOTES
Progress Note - Marguerite Angel 1955, 60 y o  female MRN: [id number]    Unit/Bed#: MARCIO ADAIR [facility] 004-42 Encounter: [id number]    Primary Care Provider: Dima Love MD   Date and time admitted to hospital: 7/23/2019  5:30 PM        * Schizoaffective disorder, bipolar type [facility])  Assessment & Plan  Psychiatry Progress Note    Patient is still refusing to take showers claiming that water is to call for her  She again was laying back on bed and did not attend any groups yesterday despite letting the staff know that she will  She continues to verbalize that she does not feel quite safe on the unit and become psychosomatic claiming that she cannot breathe even though she does appear to breathe fine while observed  She is still  suspicious about certain staff who gives her medications like her inhalers and the spirometer off and on     Her group attendance attendance is still not that great   She was again reminded that  if she improves her group attendance and attends to her ADLs skills,  we can always look into discharging her back to the [facility] care [facility] home again  She continues to present with stilted speech being too formal as if talking in a court of law which has not changed either  She does not admit to any overt hallucinations or paranoia but still appears to harbor some covert  paranoia based on her demeanor and interaction on the unit  She tells me she is not much bothered about the micro chip on her arm she does not talk about it unless brought up  No current suicidal homicidal thoughts intent or plans reported  No overt hallucinations or other delusions reported   No signs or sxs of agranulocytosis or myocarditis or endocarditis and she is moving her bowels so far with no issues  Patient was again reminded to attend to ADLs skills and take showers at least every other day and attend more groups     She has long fingernails and I did remind her to keep them clean or have them look dirty  Current medications:    Current Facility-Administered Medications:     acetaminophen (TYLENOL) tablet 650 mg, 650 mg, Oral, Q6H PRN, Zander Yanez MD    albuterol (PROVENTIL HFA,VENTOLIN HFA) inhaler 2 puff, 2 puff, Inhalation, Q4H PRN, Zander Yanez MD, 2 puff at 07/25/19 1200    aluminum-magnesium hydroxide-simethicone (MYLANTA) 200-200-20 mg/5 mL oral suspension 15 mL, 15 mL, Oral, Q4H PRN, Zander Yanez MD    ammonium lactate (LAC-HYDRIN) 12 % lotion 1 application, 1 application, Topical, BID PRN, Zander Yanez MD    benzonatate (TESSALON PERLES) capsule 100 mg, 100 mg, Oral, TID PRN, Zander Yanez MD    benztropine (COGENTIN) injection 1 mg, 1 mg, Intramuscular, Q8H PRN, Zander Yanez MD    cloZAPine (CLOZARIL) tablet 50 mg, 50 mg, Oral, BID, Zander Yanez MD, 50 mg at 09/26/19 2129    EPINEPHrine PF (ADRENALIN) 1 mg/mL injection 0 15 mg, 0 15 mg, Intramuscular, Once PRN, Zander Yanez MD    FLUoxetine (PROzac) capsule 20 mg, 20 mg, Oral, Daily, Zander Yanez MD, 20 mg at 09/27/19 0904    fluticasone-vilanterol (BREO ELLIPTA) 200-25 MCG/INH inhaler 1 puff, 1 puff, Inhalation, Daily, Zander Yanez MD, 1 puff at 09/27/19 0904    ketotifen (ZADITOR) 0 025 % ophthalmic solution 1 drop, 1 drop, Right Eye, BID PRN, Zander Yanez MD    levothyroxine tablet 125 mcg, 125 mcg, Oral, Early Morning, Zander Yanez MD, 125 mcg at 09/27/19 0608    magnesium hydroxide (MILK OF MAGNESIA) 400 mg/5 mL oral suspension 30 mL, 30 mL, Oral, Daily PRN, Zander Yanez MD    montelukast (SINGULAIR) tablet 10 mg, 10 mg, Oral, HS, Zander Yanez MD, 10 mg at 09/26/19 2129    OLANZapine (ZyPREXA) IM injection 5 mg, 5 mg, Intramuscular, Q8H PRN, Zander Yanez MD    OLANZapine (ZyPREXA) tablet 5 mg, 5 mg, Oral, Q8H PRN, Zander Yanez MD    pantoprazole (PROTONIX) EC tablet 40 mg, 40 mg, Oral, Early Morning, Zander Yanez MD, 40 mg at 09/27/19 0608    polyethylene glycol (MIRALAX) packet 17 g, 17 g, Oral, Daily PRN, MD Alpa Brown polyvinyl alcohol (LIQUIFILM TEARS) 1 4 % ophthalmic solution 1 drop, 1 drop, Both Eyes, Q3H PRN, Jeet Levine MD    theophylline (JEF-24) 24 hr capsule 200 mg, 200 mg, Oral, Daily, Jeet Levine MD, 200 mg at 09/27/19 0904    tiotropium (SPIRIVA) capsule for inhaler 18 mcg, 18 mcg, Inhalation, Daily, Jeet Levine MD, 18 mcg at 09/27/19 0904    traZODone (DESYREL) tablet 25 mg, 25 mg, Oral, HS PRN, Jeet Levine MD  Justification if on more than two antipsychotics:  Only on his clozapine  Side effects if any:  None    Risks , benefits, side effects and precautions of medications discussed with patient and reminded patient to let us know any problems with medications     Objective:     Vital Signs:  Vitals:    09/26/19 1900 09/26/19 2136 09/27/19 0730 09/27/19 0732   BP: 101/63  108/56 99/57   BP Location: Left arm  Left arm Left arm   Pulse: 91  99 103   Resp: 16  18    Temp: 97 7 °F (36 5 °C)  98 5 °F (36 9 °C)    TempSrc: Temporal  Temporal    SpO2: 93% 93%     Weight:       Height:         Appearance:  age appropriate, casually dressed and overweight older than stated age   Behavior:  deviant, evasive and guarded and suspicious but with good eye contact   Speech:  normal pitch and normal volume but tends to become loud at times   Mood:  anxious and dysthymic   Affect:  mood-congruent   Thought Process:  goal directed and illogical slightly pressured but able to be redirected and talks as if in a court of low being stilted   Thought Content:  delusions  grandiose and somatic about not being able to breathe despite being on nasal oxygen and paranoid about people out to harm her and Atrovent inhaler tainteded with peanut oil  No current suicidal homicidal thoughts intent or plans reported    No phobias obsessions or compulsions reported   Perceptual Disturbances: None and does not appear responding to internal stimuli   Risk Potential: Tendency to not care for herself if she goes off treatment   Sensorium: person, place, time/date, situation, day of week, month of year and time   Cognition:  grossly intact   Consciousness:  alert and awake    Attention: Intact concentration and attention span   Intellect: Considered to be at least of average intelligence   Insight:  limited in denial   Judgment: poor      Motor Activity: no abnormal movements         Recent Labs:  Results Reviewed     None          I/O Past 24 hours:  No intake/output data recorded  No intake/output data recorded  Assessment / Plan:     [facility]      Reason for continued inpatient care:  Because of underlying paranoia and refusal to go back to the personal care home and inability to care for herself on her own  Acceptance by patient:  Accepting  Mateusz Mcghee in recovery:  About living in another personal care home once she feels better  Understanding of medications :  Has some understanding  Involved in reintegration process:  Adjusting to the unit  Trusting in relatoinship with psychiatrist:  Trusting    Recommended Treatment:    Medication changes:  1) no changes today  Non-pharmacological treatments  1) continue with  individual therapy group therapy, milieu therapy and occupational therapy and milieu therapy involving multidisciplinary team approach with psychotherapist, case management, nursing, peer support services, art therapist etc using recovery principles and psycho-education about accepting illness and need for treatment   3) encourage to attend to ADLs like taking showers and wearing clean clothes   4) Encourage to attend groups   Safety  1) Safety/communication plan established targeting dynamic risk factors above    Discharge Plan most likely back to the a:  Personal care boarding home with act services once her delusions are managed and mood becomes more stabilized and she becomes more receptive to treatment compliance    Counseling / Coordination of Care    Total floor / unit time spent today 15 minutes  Greater than 50% of total time was spent with the patient and / or family counseling and / or coordination of care  A description of the counseling / coordination of care  Patient's Rights, confidentiality and exceptions to confidentiality, use of automated medical record, Jeb T[facility] staff access to medical record, and consent to treatment reviewed      Jill Gayle MD

## 2019-09-28 LAB
BASOPHILS # BLD AUTO: 0.1 THOUSANDS/ΜL (ref 0–0.1)
BASOPHILS NFR BLD AUTO: 1 % (ref 0–1)
EOSINOPHIL # BLD AUTO: 0.1 THOUSAND/ΜL (ref 0–0.4)
EOSINOPHIL NFR BLD AUTO: 2 % (ref 0–6)
ERYTHROCYTE [DISTWIDTH] IN BLOOD BY AUTOMATED COUNT: 13.8 %
HCT VFR BLD AUTO: 40.6 % (ref 36–46)
HGB BLD-MCNC: 13.8 G/DL (ref 12–16)
LYMPHOCYTES # BLD AUTO: 2.9 THOUSANDS/ΜL (ref 0.5–4)
LYMPHOCYTES NFR BLD AUTO: 40 % (ref 25–45)
MCH RBC QN AUTO: 31 PG (ref 26–34)
MCHC RBC AUTO-ENTMCNC: 33.9 G/DL (ref 31–36)
MCV RBC AUTO: 91 FL (ref 80–100)
MONOCYTES # BLD AUTO: 0.7 THOUSAND/ΜL (ref 0.2–0.9)
MONOCYTES NFR BLD AUTO: 10 % (ref 1–10)
NEUTROPHILS # BLD AUTO: 3.4 THOUSANDS/ΜL (ref 1.8–7.8)
NEUTS SEG NFR BLD AUTO: 47 % (ref 45–65)
PLATELET # BLD AUTO: 230 THOUSANDS/UL (ref 150–450)
PMV BLD AUTO: 9.9 FL (ref 8.9–12.7)
RBC # BLD AUTO: 4.44 MILLION/UL (ref 4–5.2)
WBC # BLD AUTO: 7.1 THOUSAND/UL (ref 4.5–11)

## 2019-09-28 PROCEDURE — 85025 COMPLETE CBC W/AUTO DIFF WBC: CPT | Performed by: PSYCHIATRY & NEUROLOGY

## 2019-09-28 RX ADMIN — PANTOPRAZOLE SODIUM 40 MG: 40 TABLET, DELAYED RELEASE ORAL at 06:05

## 2019-09-28 RX ADMIN — THEOPHYLLINE ANHYDROUS 200 MG: 200 CAPSULE, EXTENDED RELEASE ORAL at 08:19

## 2019-09-28 RX ADMIN — TIOTROPIUM BROMIDE 18 MCG: 18 CAPSULE ORAL; RESPIRATORY (INHALATION) at 08:19

## 2019-09-28 RX ADMIN — LEVOTHYROXINE SODIUM 125 MCG: 125 TABLET ORAL at 06:05

## 2019-09-28 RX ADMIN — CLOZAPINE 50 MG: 25 TABLET ORAL at 17:38

## 2019-09-28 RX ADMIN — FLUTICASONE FUROATE AND VILANTEROL TRIFENATATE 1 PUFF: 200; 25 POWDER RESPIRATORY (INHALATION) at 08:19

## 2019-09-28 RX ADMIN — MONTELUKAST SODIUM 10 MG: 10 TABLET, COATED ORAL at 21:28

## 2019-09-28 RX ADMIN — FLUOXETINE 20 MG: 20 CAPSULE ORAL at 08:19

## 2019-09-28 RX ADMIN — CLOZAPINE 50 MG: 25 TABLET ORAL at 21:28

## 2019-09-28 NOTE — PLAN OF CARE
Problem: Alteration in Thoughts and Perception  Goal: Agree to be compliant with medication regime, as prescribed and report medication side effects  Description  Interventions:  - Offer appropriate PRN medication and supervise ingestion; conduct aims, as needed   Outcome: Progressing     Problem: Alteration in Orientation  Goal: Interact with staff daily  Description  Interventions:  - Assess and re-assess patient's level of orientation  - Engage patient in 1 on 1 interactions, daily, for a minimum of 15 minutes   - Establish rapport/trust with patient   Outcome: Progressing     Problem: Alteration in Thoughts and Perception  Goal: Attend and participate in unit activities, including therapeutic, recreational, and educational groups  Description  Interventions:  - Provide therapeutic and educational activities daily, encourage attendance and participation, and document same in the medical record     CERTIFIED PEER SPECIALIST INTERVENTIONS:    Complete peer assessment with patient to assess their needs and identify their goals to complete while in the recovery program as well as once discharged into the community  Patient will complete WRAP Plan, Crisis Plan and 5 Life Domains  Patient will attend 50% of groups offered on the unit  Patient will complete a goal card weekly  Outcome: Not Progressing  Goal: Complete daily ADLs, including personal hygiene independently, as able  Description  Interventions:  - Observe, teach, and assist patient with ADLS  - Monitor and promote a balance of rest/activity, with adequate nutrition and elimination   Outcome: Not Progressing     Problem: Depression  Goal: Refrain from isolation  Description  Interventions:  - Develop a trusting relationship   - Encourage socialization   Outcome: Not Progressing     [address] continues to isolate in her room in bed  No motion to attend to hygiene despite being encouraged by staff   Did come out to eat 100% of meal, punctual for scheduled medicines  Used the Duroline reaching volumes of 900-1000ml  Did not attend Movie Social or PM Group outside on deck  "I should have " Had an HS snack  Reminisced about relatives who have passed on as recalled changes in the ethnicities of neighborhoods she has known for years  Pleasant  Wearing her QHS humidified nasal O2 @ 1L now for bed

## 2019-09-28 NOTE — PROGRESS NOTES
Sleeping at this time, O2 on, no s/s of resp distress   Precautions maintained   Monitoring continues

## 2019-09-28 NOTE — PROGRESS NOTES
Spoke with Rena Rausch regarding needing to take a shower and body odor  She stated she will take a shower after breakfast this morning  Bathroom set up for her with shower necessities    Morning lab work drawn and sent to lab

## 2019-09-28 NOTE — PLAN OF CARE
Problem: Alteration in Thoughts and Perception  Goal: Verbalize thoughts and feelings  Description  Interventions:  - Promote a nonjudgmental and trusting relationship with the patient through active listening and therapeutic communication  - Assess patient's level of functioning, behavior and potential for risk  - Engage patient in 1 on 1 interactions for a minimum of 15 minutes each session  - Encourage patient to express fears, feelings, frustrations, and discuss symptoms    - [place] patient to reality, help patient recognize reality-based thinking   - Administer medications as ordered and assess for potential side effects  - Provide the patient education related to the signs and symptoms of the illness and desired effects of prescribed medications  Outcome: Not Progressing  Goal: Agree to be compliant with medication regime, as prescribed and report medication side effects  Description  Interventions:  - Offer appropriate PRN medication and supervise ingestion; conduct aims, as needed   Outcome: Progressing  Goal: Attend and participate in unit activities, including therapeutic, recreational, and educational groups  Description  Interventions:  - Provide therapeutic and educational activities daily, encourage attendance and participation, and document same in the medical record     CERTIFIED PEER SPECIALIST INTERVENTIONS:    Complete peer assessment with patient to assess their needs and identify their goals to complete while in the recovery program as well as once discharged into the community  Patient will complete WRAP Plan, Crisis Plan and 5 Life Domains  Patient will attend 50% of groups offered on the unit  Patient will complete a goal card weekly      Outcome: Not Progressing  Goal: Complete daily ADLs, including personal hygiene independently, as able  Description  Interventions:  - Observe, teach, and assist patient with ADLS  - Monitor and promote a balance of rest/activity, with adequate nutrition and elimination   Outcome: Not Progressing     Sania Burn is isolative to herself in her bed and refused to shower today even with multiple attempts with encouragement from staff  Blunted in affect and gaurded  Leaves her bed primarily for meals  Remains somatically preoccupied with her breathing  She was compliant and prompt for her morning medications  Will continue to monitor for changes

## 2019-09-29 RX ADMIN — PANTOPRAZOLE SODIUM 40 MG: 40 TABLET, DELAYED RELEASE ORAL at 06:31

## 2019-09-29 RX ADMIN — CLOZAPINE 50 MG: 25 TABLET ORAL at 21:30

## 2019-09-29 RX ADMIN — LEVOTHYROXINE SODIUM 125 MCG: 125 TABLET ORAL at 06:31

## 2019-09-29 RX ADMIN — FLUTICASONE FUROATE AND VILANTEROL TRIFENATATE 1 PUFF: 200; 25 POWDER RESPIRATORY (INHALATION) at 08:35

## 2019-09-29 RX ADMIN — FLUOXETINE 20 MG: 20 CAPSULE ORAL at 08:34

## 2019-09-29 RX ADMIN — MONTELUKAST SODIUM 10 MG: 10 TABLET, COATED ORAL at 21:29

## 2019-09-29 RX ADMIN — TIOTROPIUM BROMIDE 18 MCG: 18 CAPSULE ORAL; RESPIRATORY (INHALATION) at 08:35

## 2019-09-29 RX ADMIN — CLOZAPINE 50 MG: 25 TABLET ORAL at 17:35

## 2019-09-29 RX ADMIN — THEOPHYLLINE ANHYDROUS 200 MG: 200 CAPSULE, EXTENDED RELEASE ORAL at 08:34

## 2019-09-29 NOTE — PLAN OF CARE
Problem: Alteration in Thoughts and Perception  Goal: Verbalize thoughts and feelings  Description  Interventions:  - Promote a nonjudgmental and trusting relationship with the patient through active listening and therapeutic communication  - Assess patient's level of functioning, behavior and potential for risk  - Engage patient in 1 on 1 interactions for a minimum of 15 minutes each session  - Encourage patient to express fears, feelings, frustrations, and discuss symptoms    - [place] patient to reality, help patient recognize reality-based thinking   - Administer medications as ordered and assess for potential side effects  - Provide the patient education related to the signs and symptoms of the illness and desired effects of prescribed medications  Outcome: Progressing  Goal: Agree to be compliant with medication regime, as prescribed and report medication side effects  Description  Interventions:  - Offer appropriate PRN medication and supervise ingestion; conduct aims, as needed   Outcome: Progressing     Problem: Alteration in Thoughts and Perception  Goal: Attend and participate in unit activities, including therapeutic, recreational, and educational groups  Description  Interventions:  - Provide therapeutic and educational activities daily, encourage attendance and participation, and document same in the medical record     CERTIFIED PEER SPECIALIST INTERVENTIONS:    Complete peer assessment with patient to assess their needs and identify their goals to complete while in the recovery program as well as once discharged into the community  Patient will complete WRAP Plan, Crisis Plan and 5 Life Domains  Patient will attend 50% of groups offered on the unit  Patient will complete a goal card weekly      Outcome: Not Progressing  Goal: Recognize dysfunctional thoughts, communicate reality-based thoughts at the time of discharge  Description  Interventions:  - Provide medication and psycho-education to assist patient in compliance and developing insight into his/her illness   Outcome: Not Progressing  Goal: Complete daily ADLs, including personal hygiene independently, as able  Description  Interventions:  - Observe, teach, and assist patient with ADLS  - Monitor and promote a balance of rest/activity, with adequate nutrition and elimination   Outcome: Not Progressing     Problem: Ineffective Coping  Goal: Demonstrates healthy coping skills  Outcome: Not Progressing     Problem: Depression  Goal: Refrain from isolation  Description  Interventions:  - Develop a trusting relationship   - Encourage socialization   Outcome: Not Progressing     [address] continues to put up road blocks, excuses for why she can't possibly do things such as shower  Was again offered help w/this task, but, vetoed the shower room offered saying it would be an intrusion to the women who reside in that room & that one woman in room & she don't get along (no evidence of this ever observed & has no bearing on shower as peer isn't the one assisting her)  Affronted by firm approach of 11-7 staff who attempted to get her to shower  "She said I smelled! I've been upset all day " Presented to her that this nurse told her the same thing some two weeks ago when needed to bathe  Reasons, "It depends how it is said " Admits to urinary dribbling @ times, but, refuses a pad  "I don't want to get into that habit " Pointed out to her that she is making excuses  Ignored, then said she is a creature of habit, making all this difficult for her  She is choosy about who she accepts to assist her w/tasks  Did come out for meal, but, ate nothing  Did come to window for supper medicine  Used the [email or web address] achieving 900-1000ml, even a few 1250ml's  She is pleasant, but, isolative, resistive  Has not responded to invitations for [employer] outside on deck or for PM Group

## 2019-09-29 NOTE — PROGRESS NOTES
Psychiatry Progress Note    Subjective: Interval History     The patient is lying in bed this morning with oxygen on which she uses at night for sleep  The patient continues to be accusatory of staff and states that they command her to do things  Patient needs much encouragement to get up and shower  She makes lots of excuses if she does not want to do something that she is asked to do  She does continue to be medication and meal compliant  She is repetitive at times      Behavior over the last 24 hours:  unchanged  Sleep: normal  Appetite: normal  Medication side effects: No  ROS: no complaints    Current medications:    Current Facility-Administered Medications:     acetaminophen (TYLENOL) tablet 650 mg, 650 mg, Oral, Q6H PRN, Ervin Little MD    albuterol (PROVENTIL HFA,VENTOLIN HFA) inhaler 2 puff, 2 puff, Inhalation, Q4H PRN, Ervin Little MD, 2 puff at 07/25/19 1200    aluminum-magnesium hydroxide-simethicone (MYLANTA) 200-200-20 mg/5 mL oral suspension 15 mL, 15 mL, Oral, Q4H PRN, Ervin Little MD    ammonium lactate (LAC-HYDRIN) 12 % lotion 1 application, 1 application, Topical, BID PRN, Ervin Little MD    benzonatate (TESSALON PERLES) capsule 100 mg, 100 mg, Oral, TID PRN, Ervin Little MD    benztropine (COGENTIN) injection 1 mg, 1 mg, Intramuscular, Q8H PRN, Ervin Little MD    cloZAPine (CLOZARIL) tablet 50 mg, 50 mg, Oral, BID, Ervin Little MD, 50 mg at 09/28/19 2128    EPINEPHrine PF (ADRENALIN) 1 mg/mL injection 0 15 mg, 0 15 mg, Intramuscular, Once PRN, Ervin Little MD    FLUoxetine (PROzac) capsule 20 mg, 20 mg, Oral, Daily, Ervin Little MD, 20 mg at 09/29/19 0834    fluticasone-vilanterol (BREO ELLIPTA) 200-25 MCG/INH inhaler 1 puff, 1 puff, Inhalation, Daily, Ervin Little MD, 1 puff at 09/29/19 0835    ketotifen (ZADITOR) 0 025 % ophthalmic solution 1 drop, 1 drop, Right Eye, BID PRN, Ervin Little MD    levothyroxine tablet 125 mcg, 125 mcg, Oral, Early Morning, Ervin Little MD, 125 mcg at 09/29/19 0631    magnesium hydroxide (MILK OF MAGNESIA) 400 mg/5 mL oral suspension 30 mL, 30 mL, Oral, Daily PRN, Greer Beltran MD    montelukast (SINGULAIR) tablet 10 mg, 10 mg, Oral, HS, Greer Beltran MD, 10 mg at 09/28/19 2128    OLANZapine (ZyPREXA) IM injection 5 mg, 5 mg, Intramuscular, Q8H PRN, Greer Beltran MD    OLANZapine (ZyPREXA) tablet 5 mg, 5 mg, Oral, Q8H PRN, Greer Beltran MD    pantoprazole (PROTONIX) EC tablet 40 mg, 40 mg, Oral, Early Morning, Greer Beltran MD, 40 mg at 09/29/19 0631    polyethylene glycol (MIRALAX) packet 17 g, 17 g, Oral, Daily PRN, Greer Beltran MD    polyvinyl alcohol (LIQUIFILM TEARS) 1 4 % ophthalmic solution 1 drop, 1 drop, Both Eyes, Q3H PRN, Greer Beltran MD    theophylline (JEF-24) 24 hr capsule 200 mg, 200 mg, Oral, Daily, Greer Beltran MD, 200 mg at 09/29/19 0834    tiotropium (SPIRIVA) capsule for inhaler 18 mcg, 18 mcg, Inhalation, Daily, Greer Beltran MD, 18 mcg at 09/29/19 0835    traZODone (DESYREL) tablet 25 mg, 25 mg, Oral, HS PRN, Greer Beltran MD    Current Problem List:    Patient Active Problem List   Diagnosis    [facility]    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[facility]    Tobacco use disorder, continuous    Bipolar disorder ([facility]    Left hip pain    Lactic acidosis    Hypokalemia    Hypomagnesemia    Compression fracture of L4 lumbar vertebra    Thoracic compression fracture (HCC)    Ventral hernia    Parapneumonic effusion    Acute on chronic respiratory failure with hypoxia (HCC)    Chronic respiratory failure (HCC)    Hypophosphatemia    Elevated MCV    Schizoaffective disorder, bipolar type ([facility]    Acquired hypothyroidism    Gastroesophageal reflux disease without esophagitis    Abnormal CT of the chest    Excessive cerumen in left ear canal    Lipoma of right upper extremity    Polydipsia    Localized swelling of both lower legs    Noncompliant with deep vein thrombosis (DVT) prophylaxis    Allergic conjunctivitis of right eye       Problem list reviewed 09/29/19     Objective:     Vital Signs:  Vitals:    09/28/19 2005 09/28/19 2130 09/29/19 0700 09/29/19 0705   BP: 91/58  114/91 98/67   BP Location: Left arm  Left arm Left arm   Pulse: 87  103 (!) 115   Resp: 18  20 20   Temp: 97 6 °F (36 4 °C)  97 5 °F (36 4 °C)    TempSrc: Temporal  Temporal    SpO2: 97% 95%     Weight:   68 kg (150 lb)    Height:             Appearance:  age appropriate and casually dressed   Behavior:  deviant and evasive   Speech:  pressured   Mood:  anxious   Affect:  mood-congruent   Thought Process:  circumstantial   Thought Content:  delusions  grandiose and somatic   Perceptual Disturbances: None   Risk Potential: none   Sensorium:  person, place, situation and time   Cognition:  intact   Consciousness:  alert and awake    Attention: attention span and concentration were age appropriate   Intellect: average   Insight:  poor   Judgment: poor      Motor Activity: no abnormal movements       I/O Past 24 hours:  No intake/output data recorded  No intake/output data recorded  Labs:  Reviewed 09/29/19      Assessment / Plan:     [facility]ffective [facility]    Recommended Treatment:      Medication changes:  1) continue current medication regimen  Non-pharmacological treatments  1) Continue with group therapy, milieu therapy and occupational therapy  Safety  1) Safety/communication plan established targeting dynamic risk factors above  2) Risks, benefits, and possible side effects of medications explained to patient and patient verbalizes understanding  Counseling / Coordination of Care    Total floor / unit time spent today 20 minutes  Greater than 50% of total time was spent with the patient and / or family counseling and / or coordination of care  A description of the counseling / coordination of care       Patient's Rights, confidentiality and exceptions to confidentiality, use of automated medical record, [address] staff access to medical record, and consent to treatment reviewed      Doris Valverde PA-C

## 2019-09-29 NOTE — PLAN OF CARE
Problem: Alteration in Thoughts and Perception  Goal: Verbalize thoughts and feelings  Description  Interventions:  - Promote a nonjudgmental and trusting relationship with the patient through active listening and therapeutic communication  - Assess patient's level of functioning, behavior and potential for risk  - Engage patient in 1 on 1 interactions for a minimum of 15 minutes each session  - Encourage patient to express fears, feelings, frustrations, and discuss symptoms    - [place] patient to reality, help patient recognize reality-based thinking   - Administer medications as ordered and assess for potential side effects  - Provide the patient education related to the signs and symptoms of the illness and desired effects of prescribed medications  Outcome: Progressing     Problem: Risk for Self Injury/Neglect  Goal: Verbalize thoughts and feelings  Description  Interventions:  - Assess and re-assess patient's lethality and potential for self-injury  - Engage patient in 1:1 interactions, daily, for a minimum of 15 minutes  - Encourage patient to express feelings, fears, frustrations, hopes  - Establish rapport/trust with patient   Outcome: Progressing  Goal: Refrain from harming self  Description  Interventions:  - Monitor patient closely, per order  - Develop a trusting relationship  - Supervise medication ingestion, monitor effects and side effects   Outcome: Progressing     Problem: Depression  Goal: Refrain from isolation  Description  Interventions:  - Develop a trusting relationship   - Encourage socialization   Outcome: Progressing   Patient is visible on unit intermittently  Med and meal compliant  Patient presents with a pleasant affect, interacts with staff appropriately  Patient had one complaint of shortness of breath this afternoon  Pulse ox taken it 97% on RA  Patient encouraged to relax and interact on the unit  No complaints of SI, HI, audio or visual hallucinations, anxiety or depression   Will continue to monitor for changes in current status

## 2019-09-29 NOTE — PROGRESS NOTES
2130 Niki Henderson did come out for HS snack & for HS medicine  She is now wearing her QHS humidified nasal O2 @ 1L for bed

## 2019-09-29 NOTE — PLAN OF CARE
Problem: SAFETY ADULT  Goal: Patient will remain free of falls  Description  INTERVENTIONS:  - Assess patient frequently for physical needs  -  Identify cognitive and physical deficits and behaviors that affect risk of falls  -  [place] fall precautions as indicated by assessment   - Educate patient/family on patient safety including physical limitations  - Instruct patient to call for assistance with activity based on assessment  - Modify environment to reduce risk of injury  - Consider OT/PT consult to assist with strengthening/mobility  Outcome: Progressing     Problem: RESPIRATORY - ADULT  Goal: Achieves optimal ventilation and oxygenation  Description  INTERVENTIONS:  - Assess for changes in respiratory status  - Assess for changes in mentation and behavior  - Position to facilitate oxygenation and minimize respiratory effort  - Oxygen administration by appropriate delivery method based on oxygen saturation (per order) or ABGs  - Initiate smoking cessation education as indicated  - Encourage broncho-pulmonary hygiene including cough, deep breathe, Incentive Spirometry  - Assess the need for suctioning and aspirate as needed  - Assess and instruct to report SOB or any respiratory difficulty  - Respiratory Therapy support as indicated  Outcome: Progressing   Pt remains in bed quietly resting eyes closed, chest noted to be rising , Oxygen maintained at 1 l via N/C , while in bed No s/s of respiratory distress noted, fall precaution maintained  , medication compliant, will monitor for change in behavior/assessment  ,

## 2019-09-30 RX ADMIN — FLUOXETINE 20 MG: 20 CAPSULE ORAL at 08:31

## 2019-09-30 RX ADMIN — CLOZAPINE 50 MG: 25 TABLET ORAL at 21:23

## 2019-09-30 RX ADMIN — CLOZAPINE 50 MG: 25 TABLET ORAL at 17:48

## 2019-09-30 RX ADMIN — PANTOPRAZOLE SODIUM 40 MG: 40 TABLET, DELAYED RELEASE ORAL at 06:05

## 2019-09-30 RX ADMIN — MONTELUKAST SODIUM 10 MG: 10 TABLET, COATED ORAL at 21:23

## 2019-09-30 RX ADMIN — FLUTICASONE FUROATE AND VILANTEROL TRIFENATATE 1 PUFF: 200; 25 POWDER RESPIRATORY (INHALATION) at 08:31

## 2019-09-30 RX ADMIN — TIOTROPIUM BROMIDE 18 MCG: 18 CAPSULE ORAL; RESPIRATORY (INHALATION) at 09:40

## 2019-09-30 RX ADMIN — THEOPHYLLINE ANHYDROUS 200 MG: 200 CAPSULE, EXTENDED RELEASE ORAL at 08:31

## 2019-09-30 RX ADMIN — LEVOTHYROXINE SODIUM 125 MCG: 125 TABLET ORAL at 06:05

## 2019-09-30 NOTE — PLAN OF CARE
Problem: Alteration in Thoughts and Perception  Goal: Verbalize thoughts and feelings  Description  Interventions:  - Promote a nonjudgmental and trusting relationship with the patient through active listening and therapeutic communication  - Assess patient's level of functioning, behavior and potential for risk  - Engage patient in 1 on 1 interactions for a minimum of 15 minutes each session  - Encourage patient to express fears, feelings, frustrations, and discuss symptoms    - [place] patient to reality, help patient recognize reality-based thinking   - Administer medications as ordered and assess for potential side effects  - Provide the patient education related to the signs and symptoms of the illness and desired effects of prescribed medications  Outcome: Progressing  Goal: Agree to be compliant with medication regime, as prescribed and report medication side effects  Description  Interventions:  - Offer appropriate PRN medication and supervise ingestion; conduct aims, as needed   Outcome: Progressing     Problem: Alteration in Thoughts and Perception  Goal: Complete daily ADLs, including personal hygiene independently, as able  Description  Interventions:  - Observe, teach, and assist patient with ADLS  - Monitor and promote a balance of rest/activity, with adequate nutrition and elimination   Outcome: Not Progressing     Problem: Depression  Goal: Refrain from isolation  Description  Interventions:  - Develop a trusting relationship   - Encourage socialization   Outcome: Not [place] continues to isolate in room in bed  Was quite somatic, bordering on panic up until supper time as saying felt lightheaded on rising  Wanting to blame "ear wax"  Was instead encouraged to drink fluids; had 960ml in water  Was encouraged to eat whole tray @ meal & did have 100%, egg salad on crackers along w/fluids, dessert  Was also encouraged to be more active; has made no effort   After some post meal rest did a good job w/the Incentive Spirometer, reaching consistent volumes reached of 1000-1100ml  But, has not attended to any hygiene  Continues to make excuses  But, is pleasant, social w/roommate

## 2019-09-30 NOTE — PLAN OF CARE
Problem: Alteration in Thoughts and Perception  Goal: Verbalize thoughts and feelings  Description  Interventions:  - Promote a nonjudgmental and trusting relationship with the patient through active listening and therapeutic communication  - Assess patient's level of functioning, behavior and potential for risk  - Engage patient in 1 on 1 interactions for a minimum of 15 minutes each session  - Encourage patient to express fears, feelings, frustrations, and discuss symptoms    - [place] patient to reality, help patient recognize reality-based thinking   - Administer medications as ordered and assess for potential side effects  - Provide the patient education related to the signs and symptoms of the illness and desired effects of prescribed medications  Outcome: Progressing  Goal: Agree to be compliant with medication regime, as prescribed and report medication side effects  Description  Interventions:  - Offer appropriate PRN medication and supervise ingestion; conduct aims, as needed   Outcome: Progressing     Problem: Alteration in Thoughts and Perception  Goal: Attend and participate in unit activities, including therapeutic, recreational, and educational groups  Description  Interventions:  - Provide therapeutic and educational activities daily, encourage attendance and participation, and document same in the medical record     CERTIFIED PEER SPECIALIST INTERVENTIONS:    Complete peer assessment with patient to assess their needs and identify their goals to complete while in the recovery program as well as once discharged into the community  Patient will complete WRAP Plan, Crisis Plan and 5 Life Domains  Patient will attend 50% of groups offered on the unit  Patient will complete a goal card weekly      Outcome: Not Progressing  Goal: Complete daily ADLs, including personal hygiene independently, as able  Description  Interventions:  - Observe, teach, and assist patient with ADLS  - Monitor and promote a balance of rest/activity, with adequate nutrition and elimination   Outcome: Not Progressing     Problem: Depression  Goal: Refrain from isolation  Description  Interventions:  - Develop a trusting relationship   - Encourage socialization   Outcome: Not Progressing     2[address] Leeann Prajapati continues to put up road blocks, offer objections why she can't attend to her hygiene  (Perhaps use handicap shower in Older Adult?) Did change her clothing, but, no bathing  She is pleasant, but, somatic much of shift  Early on did have elevated pulse/low BP  Was willing to hydrate (drank 1080 in water along w/120ml juice, 120ml milk @ meal), improving her BP/lowering pulse to her usual rate by 1915  Was worrying again about her accu check, her choking risk w/foods  Managed mashed potato & stuffing @ meal (had 50% of tray), potato chips & ice cream for HS snack  Continues to isolate in room in bed  Did perform the Incentive Spirometry reaching consistent volumes of 1000ml , sometimes as high as 1250ml  Has come up on own for scheduled medicine  She is pleasant, but, a bit anxious  "I haven't felt good all weekend " Did not attend  PM Group  Wearing her QHS humidified nasal O2 @ 1L now for bed

## 2019-09-30 NOTE — PLAN OF CARE

## 2019-09-30 NOTE — ASSESSMENT & PLAN NOTE
Psychiatry Progress Note    Patient is again refusing showers for days despite frequent attempts and reminders by staff because she has a bad odor  She again was laying back on bed and and has not been attending any groups preferring to lay back in bed claiming that she is not feeling well  She has multiple excuses like the water being too cold or the handicap shower is not equipped the right way etc   She continues to verbalize that she does not feel quite safe on the unit and become psychosomatic claiming that she cannot breathe even though she does appear to breathe fine while observed  She is still  suspicious about certain staff who gives her medications like her inhalers and the spirometer off and on and has complained that the staff has been ordering her around  She is also not happy with certain peers who complained about her bad order from not taking showers for days  Her group attendance attendance is still not that great   She was again reminded that  if she improves her group attendance and attends to her ADLs skills,  we can always look into discharging her back to the [facility] personal care boarding home again  She continues to claim that she will take showers but does not and also order tells me that she will start going to more groups when does not and is hence quite passive-aggressive  She continues to present with stilted speech being too formal as if talking in a court of law which has not changed either  She does not admit to any overt hallucinations or paranoia but still appears to harbor some covert  paranoia based on her demeanor and interaction on the unit  She he still believes that there is a the micro chip on her arm but she does not talk about it unless brought up  No current suicidal homicidal thoughts intent or plans reported  No overt hallucinations or other delusions reported    No signs or sxs of agranulocytosis or myocarditis or endocarditis and she is moving her bowels so far with no issues  Patient was again reminded to attend to ADLs skills and take showers at least every other day and attend more groups     She is disheveled not attending to ADLs skills or cutting her long fingernails that are dirty   Current medications:    Current Facility-Administered Medications:     acetaminophen (TYLENOL) tablet 650 mg, 650 mg, Oral, Q6H PRN, Sarah Hanna MD    albuterol (PROVENTIL HFA,VENTOLIN HFA) inhaler 2 puff, 2 puff, Inhalation, Q4H PRN, Sarah Hanna MD, 2 puff at 07/25/19 1200    aluminum-magnesium hydroxide-simethicone (MYLANTA) 200-200-20 mg/5 mL oral suspension 15 mL, 15 mL, Oral, Q4H PRN, Sarah Hanna MD    ammonium lactate (LAC-HYDRIN) 12 % lotion 1 application, 1 application, Topical, BID PRN, Sarah Hanna MD    benzonatate (TESSALON PERLES) capsule 100 mg, 100 mg, Oral, TID PRN, Sarah Hanna MD    benztropine (COGENTIN) injection 1 mg, 1 mg, Intramuscular, Q8H PRN, Sarah Hanna MD    cloZAPine (CLOZARIL) tablet 50 mg, 50 mg, Oral, BID, Sarah Hanna MD, 50 mg at 09/29/19 2130    EPINEPHrine PF (ADRENALIN) 1 mg/mL injection 0 15 mg, 0 15 mg, Intramuscular, Once PRN, Sarah Hanna MD    FLUoxetine (PROzac) capsule 20 mg, 20 mg, Oral, Daily, Sarah Hanna MD, 20 mg at 09/29/19 0834    fluticasone-vilanterol (BREO ELLIPTA) 200-25 MCG/INH inhaler 1 puff, 1 puff, Inhalation, Daily, Sarah Hanna MD, 1 puff at 09/29/19 0835    ketotifen (ZADITOR) 0 025 % ophthalmic solution 1 drop, 1 drop, Right Eye, BID PRN, Sarah Hanna MD    levothyroxine tablet 125 mcg, 125 mcg, Oral, Early Morning, Sarah Hanna MD, 125 mcg at 09/30/19 0605    magnesium hydroxide (MILK OF MAGNESIA) 400 mg/5 mL oral suspension 30 mL, 30 mL, Oral, Daily PRN, Sarah Hanna MD    montelukast (SINGULAIR) tablet 10 mg, 10 mg, Oral, HS, Sarah Hanna MD, 10 mg at 09/29/19 2129    OLANZapine (ZyPREXA) IM injection 5 mg, 5 mg, Intramuscular, Q8H PRN, Sarah Hanna MD    OLANZapine (ZyPREXA) tablet 5 mg, 5 mg, Oral, Q8H PRN, Jeffrey Gallegos MD    pantoprazole (PROTONIX) EC tablet 40 mg, 40 mg, Oral, Early Morning, Jeffrey Gallegos MD, 40 mg at 09/30/19 0605    polyethylene glycol (MIRALAX) packet 17 g, 17 g, Oral, Daily PRN, Jeffrey Gallegos MD    polyvinyl alcohol (LIQUIFILM TEARS) 1 4 % ophthalmic solution 1 drop, 1 drop, Both Eyes, Q3H PRN, Jeffrey Gallegos MD    theophylline (JEF-24) 24 hr capsule 200 mg, 200 mg, Oral, Daily, Jeffrey Gallegos MD, 200 mg at 09/29/19 0834    tiotropium (SPIRIVA) capsule for inhaler 18 mcg, 18 mcg, Inhalation, Daily, Jeffrey Gallegos MD, 18 mcg at 09/29/19 0835    traZODone (DESYREL) tablet 25 mg, 25 mg, Oral, HS PRN, Jeffrey Gallegos MD  Justification if on more than two antipsychotics:  Only on his clozapine  Side effects if any:  None    Risks , benefits, side effects and precautions of medications discussed with patient and reminded patient to let us know any problems with medications     Objective:     Vital Signs:  Vitals:    09/29/19 0705 09/29/19 1600 09/29/19 2000 09/29/19 2130   BP: 98/67 91/57 98/66    BP Location: Left arm Left arm Left arm    Pulse: (!) 115 (!) 132 99    Resp: 20 18 17    Temp:  (!) 97 2 °F (36 2 °C) (!) 97 3 °F (36 3 °C)    TempSrc:  Temporal Temporal    SpO2:  92% 91% 93%   Weight:       Height:         Appearance:  age appropriate, casually dressed and overweight older than stated age   Behavior:  deviant, evasive and guarded and suspicious but with good eye contact   Speech:  normal pitch and normal volume but tends to become loud at times   Mood:  anxious and dysthymic   Affect:  mood-congruent   Thought Process:  goal directed and illogical slightly pressured but able to be redirected and talks as if in a court of low being stilted   Thought Content:  delusions  grandiose and somatic about not being able to breathe despite being on nasal oxygen and paranoid about people out to harm her and Atrovent inhaler tainteded with peanut oil    No current suicidal homicidal thoughts intent or plans reported  No phobias obsessions or compulsions reported   Perceptual Disturbances: None and does not appear responding to internal stimuli   Risk Potential: Tendency to not care for herself if she goes off treatment   Sensorium:  person, place, time/date, situation, day of week, month of year and time   Cognition:  grossly intact   Consciousness:  alert and awake    Attention: Intact concentration and attention span   Intellect: Considered to be at least of average intelligence   Insight:  limited in denial   Judgment: poor      Motor Activity: no abnormal movements         Recent Labs:  Results Reviewed     None          I/O Past 24 hours:  No intake/output data recorded  No intake/output data recorded  Assessment / Plan:     Schizoaffective disorder, bipolar type ([facility] [address] )      Reason for continued inpatient care:  Because of underlying paranoia and refusal to go back to the personal care home and inability to care for herself on her own  Acceptance by patient:  Accepting  Letty Peters in recovery:  About living in another personal care home once she feels better  Understanding of medications :  Has some understanding  Involved in reintegration process:  Adjusting to the unit  Trusting in relatoinship with psychiatrist:  Trusting    Recommended Treatment:    Medication changes:  1) no changes today  Non-pharmacological treatments  1) continue with  individual therapy group therapy, milieu therapy and occupational therapy and milieu therapy involving multidisciplinary team approach with psychotherapist, case management, nursing, peer support services, art therapist etc using recovery principles and psycho-education about accepting illness and need for treatment   3) encourage to attend to ADLs like taking showers and wearing clean clothes   4) Encourage to attend groups   Safety  1) Safety/communication plan established targeting dynamic risk factors above    Discharge Plan most likely back to the a:  Personal care boarding home with [facility] services once her delusions are managed and mood becomes more stabilized and she becomes more receptive to treatment compliance    Counseling / Coordination of Care    Total floor / unit time spent today 15 minutes  Greater than 50% of total time was spent with the patient and / or family counseling and / or coordination of care  A description of the counseling / coordination of care  Patient's Rights, confidentiality and exceptions to confidentiality, use of automated medical record, [facility] [place] [facility] staff access to medical record, and consent to treatment reviewed      Shilpa Trujillo MD

## 2019-09-30 NOTE — PROGRESS NOTES
Progress Note - Madison Miner 1955, 60 y o  female MRN: [id number]    [facility]21 Encounter: [id number]    Primary Care Provider: Alison Saenz MD   Date and time admitted to hospital: 7/23/2019  5:30 PM        * Schizoaffective disorder, bipolar type [facility])  Assessment & Plan  Psychiatry Progress Note    Patient is again refusing showers for days, last shower being on 9/23/19  despite frequent attempts and reminders by staff because she has a bad odor  She again was laying back on bed and and has not been attending any groups preferring to lay back in bed claiming that she is not feeling well  She has multiple excuses like the water being too cold or the handicap shower is not equipped the right way etc   She continues to verbalize that she does not feel quite safe on the unit and become psychosomatic claiming that she cannot breathe even though she does appear to breathe fine while observed  She is still  suspicious about certain staff who gives her medications like her inhalers and the spirometer off and on and has complained that the staff has been ordering her around  She is also not happy with certain peers who complained about her bad order from not taking showers for days  Her group attendance attendance is still not that great   She was again reminded that  if she improves her group attendance and attends to her ADLs skills,  we can always look into [facility]  She continues to claim that she will take showers but does not and also order tells me that she will start going to more groups when does not and is hence quite passive-aggressive  She continues to present with stilted speech being too formal as if talking in a court of law which has not changed either  She does not admit to any overt hallucinations or paranoia but still appears to harbor some covert  paranoia based on her demeanor and interaction on the unit  She he still believes that there is a the micro chip on her arm but she does not talk about it unless brought up  No current suicidal homicidal thoughts intent or plans reported  No overt hallucinations or other delusions reported   No signs or sxs of agranulocytosis or myocarditis or endocarditis and she is moving her bowels so far with no issues  Patient was again reminded to attend to ADLs skills and take showers at least every other day and attend more groups   She is disheveled not attending to ADLs skills or cutting her long fingernails that are dirty and it was decided not to bring her into team meeting because of refusal to take showers and her bad  Odor and the team met her in her room and told about the same      Current medications:    Current Facility-Administered Medications:     acetaminophen (TYLENOL) tablet 650 mg, 650 mg, Oral, Q6H PRN, Roberto Colorado MD    albuterol (PROVENTIL HFA,VENTOLIN HFA) inhaler 2 puff, 2 puff, Inhalation, Q4H PRN, Roberto Colorado MD, 2 puff at 07/25/19 1200    aluminum-magnesium hydroxide-simethicone (MYLANTA) 200-200-20 mg/5 mL oral suspension 15 mL, 15 mL, Oral, Q4H PRN, Roberto Colorado MD    ammonium lactate (LAC-HYDRIN) 12 % lotion 1 application, 1 application, Topical, BID PRN, Roberto Colorado MD    benzonatate (TESSALON PERLES) capsule 100 mg, 100 mg, Oral, TID PRN, Roberto Colordao MD    benztropine (COGENTIN) injection 1 mg, 1 mg, Intramuscular, Q8H PRN, Roberto Colorado MD    cloZAPine (CLOZARIL) tablet 50 mg, 50 mg, Oral, BID, Roberto Colorado MD, 50 mg at 09/29/19 2130    EPINEPHrine PF (ADRENALIN) 1 mg/mL injection 0 15 mg, 0 15 mg, Intramuscular, Once PRN, Roberto Colorado MD    FLUoxetine (PROzac) capsule 20 mg, 20 mg, Oral, Daily, Roberto Colorado MD, 20 mg at 09/29/19 0834    fluticasone-vilanterol (BREO ELLIPTA) 200-25 MCG/INH inhaler 1 puff, 1 puff, Inhalation, Daily, Roberto Colorado MD, 1 puff at 09/29/19 0835    ketotifen (ZADITOR) 0 025 % ophthalmic solution 1 drop, 1 drop, Right Eye, BID PRN, Alirio Frazier MD    levothyroxine tablet 125 mcg, 125 mcg, Oral, Early Morning, Alirio Frazier MD, 125 mcg at 09/30/19 0605    magnesium hydroxide (MILK OF MAGNESIA) 400 mg/5 mL oral suspension 30 mL, 30 mL, Oral, Daily PRN, Alirio Frazier MD    montelukast (SINGULAIR) tablet 10 mg, 10 mg, Oral, HS, Alirio Frazier MD, 10 mg at 09/29/19 2129    OLANZapine (ZyPREXA) IM injection 5 mg, 5 mg, Intramuscular, Q8H PRN, Alirio Frazier MD    OLANZapine (ZyPREXA) tablet 5 mg, 5 mg, Oral, Q8H PRN, Alirio Frazier MD    pantoprazole (PROTONIX) EC tablet 40 mg, 40 mg, Oral, Early Morning, Alirio Frazier MD, 40 mg at 09/30/19 0605    polyethylene glycol (MIRALAX) packet 17 g, 17 g, Oral, Daily PRN, Alirio Frazier MD    polyvinyl alcohol (LIQUIFILM TEARS) 1 4 % ophthalmic solution 1 drop, 1 drop, Both Eyes, Q3H PRN, Alirio Frazier MD    theophylline (JEF-24) 24 hr capsule 200 mg, 200 mg, Oral, Daily, Alirio Frazier MD, 200 mg at 09/29/19 0834    tiotropium (SPIRIVA) capsule for inhaler 18 mcg, 18 mcg, Inhalation, Daily, Alirio Frazier MD, 18 mcg at 09/29/19 0835    traZODone (DESYREL) tablet 25 mg, 25 mg, Oral, HS PRN, Alirio Frazier MD  Justification if on more than two antipsychotics:  Only on his clozapine  Side effects if any:  None    Risks , benefits, side effects and precautions of medications discussed with patient and reminded patient to let us know any problems with medications     Objective:     Vital Signs:  Vitals:    09/29/19 0705 09/29/19 1600 09/29/19 2000 09/29/19 2130   BP: 98/67 91/57 98/66    BP Location: Left arm Left arm Left arm    Pulse: (!) 115 (!) 132 99    Resp: 20 18 17    Temp:  (!) 97 2 °F (36 2 °C) (!) 97 3 °F (36 3 °C)    TempSrc:  Temporal Temporal    SpO2:  92% 91% 93%   Weight:       Height:         Appearance:  age appropriate, casually dressed and overweight older than stated age   Behavior:  deviant, evasive and guarded and suspicious but with good eye contact   Speech:  normal pitch and normal volume but tends to become loud at times   Mood:  anxious and dysthymic   Affect:  mood-congruent   Thought Process:  goal directed and illogical slightly pressured but able to be redirected and talks as if in a court of low being stilted   Thought Content:  delusions  grandiose and somatic about not being able to breathe despite being on nasal oxygen and paranoid about people out to harm her and Atrovent inhaler tainteded with peanut oil  No current suicidal homicidal thoughts intent or plans reported  No phobias obsessions or compulsions reported   Perceptual Disturbances: None and does not appear responding to internal stimuli   Risk Potential: Tendency to not care for herself if she goes off treatment   Sensorium:  person, place, time/date, situation, day of week, month of year and time   Cognition:  grossly intact   Consciousness:  alert and awake    Attention: Intact concentration and attention span   Intellect: Considered to be at least of average intelligence   Insight:  limited in denial   Judgment: poor      Motor Activity: no abnormal movements         Recent Labs:  Results Reviewed     None          I/O Past 24 hours:  No intake/output data recorded  No intake/output data recorded          Assessment / Plan:     [facility]ffective [facility]      Reason for continued inpatient care:  Because of underlying paranoia and refusal to go back to the personal care home and inability to care for herself on her own  Acceptance by patient:  Accepting  Nithin Serra in recovery:  About living in another personal care home once she feels better  Understanding of medications :  Has some understanding  Involved in reintegration process:  Adjusting to the unit  Trusting in relatoinship with psychiatrist:  Trusting    Recommended Treatment:    Medication changes:  1) no changes today  Non-pharmacological treatments  1) continue with  individual therapy group therapy, milieu therapy and occupational therapy and milieu therapy involving multidisciplinary team approach with psychotherapist, case management, nursing, peer support services, art therapist etc using recovery principles and psycho-education about accepting illness and need for treatment   3) encourage to attend to ADLs like taking showers and wearing clean clothes   4) Encourage to attend groups   Safety  1) Safety/communication plan established targeting dynamic risk factors above  Discharge Plan most likely back to the a:  Personal care boarding home with [facility] services once her delusions are managed and mood becomes more stabilized and she becomes more receptive to treatment compliance    Counseling / Coordination of Care    Total floor / unit time spent today 15 minutes  Greater than 50% of total time was spent with the patient and / or family counseling and / or coordination of care  A description of the counseling / coordination of care  Patient's Rights, confidentiality and exceptions to confidentiality, use of automated medical record, [facility] staff access to medical record, and consent to treatment reviewed      Sandhya Bolaños MD

## 2019-09-30 NOTE — PROGRESS NOTES
Sleeping at this time, O2 on 1L, no s/s of resp distress, precautions maintained and monitoring continues

## 2019-09-30 NOTE — PLAN OF CARE
Problem: Depression  Goal: Refrain from isolation  Description  Interventions:  - Develop a trusting relationship   - Encourage socialization   Outcome: Not Progressing    Patient was visited today by Kermit Mandujano [place]  This is a regularly scheduled visit for Mondays at 3pm   Patient has been in bed all day and did politely decline CPS interaction today  An update was given to CPS who is following patient's case

## 2019-09-30 NOTE — PROGRESS NOTES
Pharmacy Clozapine Monitoring Progress Note     Veronica Romero is receiving a total daily dose of 100 mg of clozapine  Pharmacist Recommendations Based on Assessment and Plan (below):    1  Patient is Clozapine-REMS eligible, ANC was updated, with weekly monitoring frequency and the next [address] is due on 10/5/2019     2  Recommend prophylactic bowel regimen  3  Recommend repeat ECG for myocarditis monitoring and QTC prolongation due to antipsychotic  Assessment/Plan:    Phase of Treatment:     Current phase of treatment is initation/titration  Patient Clozapine REMS Eligibility:     Patient is eligible to receive clozapine and the [address] monitoring frequency is weekly per clozapine REMS  The patient's latest [address] value has been updated into the Clozapine-REMS program          ANC and Clozapine Level (if available)     The most recent [address] was   Lab Results   Component Value Date    NEUTROABS 3 40 09/28/2019    and the next lab is due on 10/5/19  Last Clozapine level (if available):    0   Lab Value Date/Time    CLOZAPINE 324 (L) 08/16/2019 1131     0   Lab Value Date/Time    NCLOZIP 207 08/16/2019 1131           Monitoring Parameters for Clozapine:     Clozapine Adverse Effect Suggested Monitoring Patient's Current Status    Agranulocytosis ANC baseline and then repeat weekly for first 6 months and every 2 weeks for the second 6 months  Maintenance after one year of therapy every month  The most recent [address] was   Lab Results   Component Value Date    NEUTROABS 3 40 09/28/2019       This ANC is considered normal range (ANC >/= 1500/mcL)  Continue current treatment and monitoring course  Respiratory Depression Please ensure patient is not on concomitant respiratory lowering medications such as benzodiazepines, sedative hypnotics (eg  zolpidem) This patient is not on respiratory depression lowering medications   Myocarditis Baseline and weekly EKG, CRP, BNP, and troponin    Weekly symptomatic complaints of fatigue, dyspnea, tachycardia, chest pain, palpitations, and fever for the first 4 weeks  Last EKG Results on  8/21/19 showed:  T wave abnormalities    Last QTC value was:  0   Lab Value Date/Time    QTCINT 427 08/21/2019 1133       Last BNP was: No results found for: NTBNP    Last Troponin was:  0   Lab Value Date/Time    TROPONINI <0 01 05/01/2019 1318    TROPONINI <0 04 05/10/2015 1948        Orthostatic hypotension/  bradycardia Blood pressure and vital signs      Monitor blood pressure and orthostatic hypotension at least twice daily upon initiation and continue to follow at least once daily afterwards  Patient's last recorded vital signs:       /67 (BP Location: Left arm)   Pulse 82   Temp 98 1 °F (36 7 °C) (Temporal)   Resp 18   Ht 5' 4" (1 626 m)   Wt 68 kg (150 lb)   SpO2 93% Comment: Pre application of QHS humidified nasal O2 @ 1L  BMI 25 75 kg/m²             Constipation (bowel obstruction due to high anticholinergic clozapine burden) Assess at baseline and weekly during first four months of therapy  Docusate 100mg BID and Miralax 17 grams daily recommended initially  Prophylactic bowel regimen not ordered and it is recommended to order a standing bowel regimen to reduce risk of bowel obstruction associated with clozapine therapy  Sialorrhea Assess at baseline and weekly during first four months of therapy  May be managed using sublingual anticholinergic preparations (atropine 1% eye drops)  Patient is not experiencing sialorrhea  This will continue to be monitored  Please note that per current REMS protocol the provider can submit a treatment rationale that justifies clozapine treatment even if patient parameters are outside of REMS recommendations  The Clozapine REMS is an FDA-mandated program implemented by the manufacturers of clozapine  ([employer]  com/CpmgClozapineUI/home  u)            Pharmacy will continue to follow patient with team, thank you    Electronically signed by: Nita Young, CrystalD, [address], Clinical Pharmacist - Psychiatry

## 2019-09-30 NOTE — PROGRESS NOTES
Patient in bed, did not attend  Poor motivation        09/30/19 1100   Activity/Group Checklist   Group   (IMR/Understanding MH Relapse )   Attendance Did not attend   Attendance Duration (min) 46-60   Affect/Mood IRMA

## 2019-09-30 NOTE — NURSING NOTE
Pt constricted and withdrawn with poor appetite  Gait steady  VSS  Refused groups and shower  Grandiose and somatic  Monitored for safety and support

## 2019-09-30 NOTE — PROGRESS NOTES
09/30/19 0900   Team Meeting   Meeting Type Daily Rounds   Team Members Present   Team Members Present Physician;Nurse;; Other (Discipline and Name)   Physician Team Member Dr Peter Anna Team Member Osmin Crawford, LISA   Care Management Team Member Brenda Mendez   Other (Discipline and Name) Rolm Mohs, LSW     Patient is uncooperative with staff  Refusing to shower  Last shower on 9/23  Peers starting to complain of patient's body odor and she then becomes offended by this  Somatic and has excuses for why she won't shower or go to groups  Not drinking enough and becomes a little dehydrated  Slept

## 2019-09-30 NOTE — PROGRESS NOTES
09/30/19 0949   Team Meeting   Meeting Type Tx Team Meeting   Initial Conference Date 09/30/19   Next Conference Date 10/07/19   Team Members Present   Team Members Present Physician;Nurse;; Other (Discipline and Name)   Physician Team Member Dr Romie Drake Team Member Alison Regalado, RN   Care Management Team Member Brenda Fowler   Other (Discipline and Name) [place], [place]   Patient/Family Present   Patient Present No   Patient's Family Present No     Patient did not attend treatment team meeting this morning  Team meet with patient outsider her room informing her that due to her lack of showering she would not be coming into the treatment team meeting room  Team encouraged her to start showering so that she is able to attend these meetings in the future  Team and patient completed risk assessment and the patient did not verbalize any desire to elope from the program  Patient verbalized understanding of consequences of eloping from treatment while on a commitment  Patient verbalized no further questions or concerns at the conclusion of the meeting

## 2019-10-01 RX ADMIN — THEOPHYLLINE ANHYDROUS 200 MG: 200 CAPSULE, EXTENDED RELEASE ORAL at 08:56

## 2019-10-01 RX ADMIN — MONTELUKAST SODIUM 10 MG: 10 TABLET, COATED ORAL at 21:07

## 2019-10-01 RX ADMIN — LEVOTHYROXINE SODIUM 125 MCG: 125 TABLET ORAL at 06:01

## 2019-10-01 RX ADMIN — CLOZAPINE 50 MG: 25 TABLET ORAL at 21:07

## 2019-10-01 RX ADMIN — FLUOXETINE 20 MG: 20 CAPSULE ORAL at 08:56

## 2019-10-01 RX ADMIN — TIOTROPIUM BROMIDE 18 MCG: 18 CAPSULE ORAL; RESPIRATORY (INHALATION) at 08:57

## 2019-10-01 RX ADMIN — CLOZAPINE 50 MG: 25 TABLET ORAL at 16:29

## 2019-10-01 RX ADMIN — PANTOPRAZOLE SODIUM 40 MG: 40 TABLET, DELAYED RELEASE ORAL at 06:01

## 2019-10-01 RX ADMIN — FLUTICASONE FUROATE AND VILANTEROL TRIFENATATE 1 PUFF: 200; 25 POWDER RESPIRATORY (INHALATION) at 08:57

## 2019-10-01 NOTE — PROGRESS NOTES
2140 Maggie did not attend PM Group  She did come out for HS snack & to take her HS medicine  Wearing her QHS humidified nasal O2 @ 1L now for bed

## 2019-10-01 NOTE — PROGRESS NOTES
Pt declined group        10/01/19 1100   Activity/Group Checklist   Group   (Emotions/Stress and Mental Health)   Attendance Did not attend   Attendance Duration (min) 46-60   Affect/Mood IRMA

## 2019-10-01 NOTE — PROGRESS NOTES
Psychiatry Progress Note    Subjective: Interval History     Roosevelt Lozano was observed laying in bed  She immediately began calling out to me, eager to report the RN for "taking a  approach" to encouraging her to shower  States she should not be "forced" to shower in the small shower in the room  The [place] bathroom is unavailable  She remarks, "Remember, I have COPD!" Multiple reasons why she cannot shower today  Patient has not showered in 8 days  We discussed the impact this has on her health, putting her at risk for infections  She then stated that she was hypoglycemic  I offered her juice, which she refused, and appeared to be delaying the shower by asking for a finger stick, not currently ordered  She denies any homicidal or suicidal ideations   "I'm a sweet woman, a jolly woman, but I'm sick and I'm weak and I can't be expected to do these things myself "    Behavior over the last 24 hours:  unchanged  Sleep: normal  Appetite: normal  Medication side effects: No  ROS: no complaints    Current medications:    Current Facility-Administered Medications:     acetaminophen (TYLENOL) tablet 650 mg, 650 mg, Oral, Q6H PRN, Prakash Brewster MD    albuterol (PROVENTIL HFA,VENTOLIN HFA) inhaler 2 puff, 2 puff, Inhalation, Q4H PRN, Prakash Brewster MD, 2 puff at 07/25/19 1200    aluminum-magnesium hydroxide-simethicone (MYLANTA) 200-200-20 mg/5 mL oral suspension 15 mL, 15 mL, Oral, Q4H PRN, Prakash Brewster MD    ammonium lactate (LAC-HYDRIN) 12 % lotion 1 application, 1 application, Topical, BID PRN, Prakash Brewster MD    benzonatate (TESSALON PERLES) capsule 100 mg, 100 mg, Oral, TID PRN, Prakash Brewster MD    benztropine (COGENTIN) injection 1 mg, 1 mg, Intramuscular, Q8H PRN, Prakash Brewster MD    cloZAPine (CLOZARIL) tablet 50 mg, 50 mg, Oral, BID, Prakash Brewster MD, 50 mg at 09/30/19 2123    EPINEPHrine PF (ADRENALIN) 1 mg/mL injection 0 15 mg, 0 15 mg, Intramuscular, Once PRN, Prakash Brewster MD    FLUoxetine (PROzac) capsule 20 mg, 20 mg, Oral, Daily, Chichi Lockett MD, 20 mg at 10/01/19 0856    fluticasone-vilanterol (BREO ELLIPTA) 200-25 MCG/INH inhaler 1 puff, 1 puff, Inhalation, Daily, Chichi Lockett MD, 1 puff at 10/01/19 0857    ketotifen (ZADITOR) 0 025 % ophthalmic solution 1 drop, 1 drop, Right Eye, BID PRN, Chichi Lockett MD    levothyroxine tablet 125 mcg, 125 mcg, Oral, Early Morning, Chichi Lockett MD, 125 mcg at 10/01/19 0601    magnesium hydroxide (MILK OF MAGNESIA) 400 mg/5 mL oral suspension 30 mL, 30 mL, Oral, Daily PRN, Chichi Lockett MD    montelukast (SINGULAIR) tablet 10 mg, 10 mg, Oral, HS, Chichi Lockett MD, 10 mg at 09/30/19 2123    OLANZapine (ZyPREXA) IM injection 5 mg, 5 mg, Intramuscular, Q8H PRN, Chichi Lockett MD    OLANZapine (ZyPREXA) tablet 5 mg, 5 mg, Oral, Q8H PRN, Chichi Lockett MD    pantoprazole (PROTONIX) EC tablet 40 mg, 40 mg, Oral, Early Morning, Chichi Lockett MD, 40 mg at 10/01/19 0601    polyethylene glycol (MIRALAX) packet 17 g, 17 g, Oral, Daily PRN, Chichi Lockett MD    polyvinyl alcohol (LIQUIFILM TEARS) 1 4 % ophthalmic solution 1 drop, 1 drop, Both Eyes, Q3H PRN, Chichi Lockett MD    theophylline (JEF-24) 24 hr capsule 200 mg, 200 mg, Oral, Daily, Chichi Lockett MD, 200 mg at 10/01/19 0856    tiotropium (SPIRIVA) capsule for inhaler 18 mcg, 18 mcg, Inhalation, Daily, Chichi Lockett MD, 18 mcg at 10/01/19 0857    traZODone (DESYREL) tablet 25 mg, 25 mg, Oral, HS PRN, Chichi Lockett MD    Current Problem List:    Patient Active Problem List   Diagnosis    [facility]    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    Tobacco use disorder, continuous    Bipolar disorder ([address]    Left hip pain    Lactic acidosis    Hypokalemia    Hypomagnesemia    Compression fracture of L4 lumbar vertebra    Thoracic compression fracture ([address]    Ventral hernia    Parapneumonic effusion    Acute on chronic respiratory failure with hypoxia ([address]    Chronic respiratory failure (HCC)    Hypophosphatemia    Elevated MCV    Schizoaffective disorder, bipolar type ([facility] )    Acquired hypothyroidism    Gastroesophageal reflux disease without esophagitis    Abnormal CT of the chest    Excessive cerumen in left ear canal    Lipoma of right upper extremity    Polydipsia    Localized swelling of both lower legs    Noncompliant with deep vein thrombosis (DVT) prophylaxis    Allergic conjunctivitis of right eye       Problem list reviewed 10/01/19     Objective:     Vital Signs:  Vitals:    09/30/19 2130 10/01/19 0730 10/01/19 0732 10/01/19 1605   BP:  106/63 93/64 103/68   BP Location:  Left arm Left arm Left arm   Pulse:  83 96 81   Resp:  18  18   Temp:  98 3 °F (36 8 °C)  (!) 97 2 °F (36 2 °C)   TempSrc:  Temporal  Temporal   SpO2: 92%      Weight:       Height:             Appearance:  age appropriate and casually dressed   Behavior:  deviant and evasive   Speech:  pressured   Mood:  anxious   Affect:  mood-congruent   Thought Process:  circumstantial   Thought Content:  delusions  grandiose and somatic   Perceptual Disturbances: None   Risk Potential: none   Sensorium:  person, place, situation and time   Cognition:  intact   Consciousness:  alert and awake    Attention: attention span and concentration were age appropriate   Intellect: average   Insight:  poor   Judgment: poor      Motor Activity: no abnormal movements       I/O Past 24 hours:  No intake/output data recorded  No intake/output data recorded  Labs:  Reviewed 10/01/19      Assessment / Plan:     Schizoaffective disorder, bipolar type ([facility] )    Recommended Treatment:      Medication changes:  1) continue current medication regimen  Non-pharmacological treatments  1) Continue with group therapy, milieu therapy and occupational therapy  Safety  1) Safety/communication plan established targeting dynamic risk factors above      2) Risks, benefits, and possible side effects of medications explained to patient and patient verbalizes understanding  Counseling / Coordination of Care    Total floor / unit time spent today 20 minutes  Greater than 50% of total time was spent with the patient and / or family counseling and / or coordination of care  A description of the counseling / coordination of care  Patient's Rights, confidentiality and exceptions to confidentiality, use of automated medical record, [facility] staff access to medical record, and consent to treatment reviewed      ABILIO Whitaker

## 2019-10-01 NOTE — PROGRESS NOTES
10/01/19 0800   Team Meeting   Meeting Type Daily Rounds   Team Members Present   Team Members Present Nurse; Other (Discipline and Name)   Nursing Team Member Tonny Santiago RN; Dedra Tapia RN   Other (Discipline and Name) DIANA Donald     Patient had a shower this morning  Continues as somatic   Slept all day yesterday and did not attend any groups  Somatic

## 2019-10-01 NOTE — PLAN OF CARE
Problem: PAIN - ADULT  Goal: Verbalizes/displays adequate comfort level or baseline comfort level  Description  Interventions:  - Encourage patient to monitor pain and request assistance  - Assess pain using appropriate pain scale  - Administer analgesics based on type and severity of pain and evaluate response  - Implement non-pharmacological measures as appropriate and evaluate response  - Consider cultural and social influences on pain and pain management  - Notify physician/advanced practitioner if interventions unsuccessful or patient reports new pain  Outcome: Progressing     Problem: SAFETY ADULT  Goal: Patient will remain free of falls  Description  INTERVENTIONS:  - Assess patient frequently for physical needs  -  Identify cognitive and physical deficits and behaviors that affect risk of falls    -  [place] fall precautions as indicated by assessment   - Educate patient/family on patient safety including physical limitations  - Instruct patient to call for assistance with activity based on assessment  - Modify environment to reduce risk of injury  - Consider OT/PT consult to assist with strengthening/mobility  Outcome: Progressing     Problem: RESPIRATORY - ADULT  Goal: Achieves optimal ventilation and oxygenation  Description  INTERVENTIONS:  - Assess for changes in respiratory status  - Assess for changes in mentation and behavior  - Position to facilitate oxygenation and minimize respiratory effort  - Oxygen administration by appropriate delivery method based on oxygen saturation (per order) or ABGs  - Initiate smoking cessation education as indicated  - Encourage broncho-pulmonary hygiene including cough, deep breathe, Incentive Spirometry  - Assess the need for suctioning and aspirate as needed  - Assess and instruct to report SOB or any respiratory difficulty  - Respiratory Therapy support as indicated  Outcome: Manuel Julio maintained on fall and SAFE precaution without incident on this shift, thus far   Laying in bed with 1L of O2 with humidifier via NC   Q 15 minutes checks during rounds continues   No behavioral noted   No SOB notes   Will continue to monitor

## 2019-10-01 NOTE — PLAN OF CARE
Problem: Alteration in Thoughts and Perception  Goal: Treatment Goal: Gain control of psychotic behaviors/thinking, reduce/eliminate presenting symptoms and demonstrate improved reality functioning upon discharge  Outcome: Progressing  Goal: Verbalize thoughts and feelings  Description  Interventions:  - Promote a nonjudgmental and trusting relationship with the patient through active listening and therapeutic communication  - Assess patient's level of functioning, behavior and potential for risk  - Engage patient in 1 on 1 interactions for a minimum of 15 minutes each session  - Encourage patient to express fears, feelings, frustrations, and discuss symptoms    - [place] patient to reality, help patient recognize reality-based thinking   - Administer medications as ordered and assess for potential side effects  - Provide the patient education related to the signs and symptoms of the illness and desired effects of prescribed medications  Outcome: Progressing  Goal: Agree to be compliant with medication regime, as prescribed and report medication side effects  Description  Interventions:  - Offer appropriate PRN medication and supervise ingestion; conduct aims, as needed   Outcome: Progressing     Problem: Risk for Self Injury/Neglect  Goal: Refrain from harming self  Description  Interventions:  - Monitor patient closely, per order  - Develop a trusting relationship  - Supervise medication ingestion, monitor effects and side effects   Outcome: Progressing     Problem: Alteration in Orientation  Goal: Interact with staff daily  Description  Interventions:  - Assess and re-assess patient's level of orientation  - Engage patient in 1 on 1 interactions, daily, for a minimum of 15 minutes   - Establish rapport/trust with patient   Outcome: Progressing  Goal: Cooperate with recommended testing/procedures  Description  Interventions:  - Determine need for ancillary testing  - Observe for mental status changes  - Implement falls/precaution protocol   Outcome: Progressing     Problem: SAFETY ADULT  Goal: Patient will remain free of falls  Description  INTERVENTIONS:  - Assess patient frequently for physical needs  -  Identify cognitive and physical deficits and behaviors that affect risk of falls  -  [place] fall precautions as indicated by assessment   - Educate patient/family on patient safety including physical limitations  - Instruct patient to call for assistance with activity based on assessment  - Modify environment to reduce risk of injury  - Consider OT/PT consult to assist with strengthening/mobility  Outcome: Progressing     Problem: RESPIRATORY - ADULT  Goal: Achieves optimal ventilation and oxygenation  Description  INTERVENTIONS:  - Assess for changes in respiratory status  - Assess for changes in mentation and behavior  - Position to facilitate oxygenation and minimize respiratory effort  - Oxygen administration by appropriate delivery method based on oxygen saturation (per order) or ABGs  - Initiate smoking cessation education as indicated  - Encourage broncho-pulmonary hygiene including cough, deep breathe, Incentive Spirometry  - Assess the need for suctioning and aspirate as needed  - Assess and instruct to report SOB or any respiratory difficulty  - Respiratory Therapy support as indicated  Outcome: Progressing     Problem: Alteration in Thoughts and Perception  Goal: Attend and participate in unit activities, including therapeutic, recreational, and educational groups  Description  Interventions:  - Provide therapeutic and educational activities daily, encourage attendance and participation, and document same in the medical record     CERTIFIED PEER SPECIALIST INTERVENTIONS:    Complete peer assessment with patient to assess their needs and identify their goals to complete while in the recovery program as well as once discharged into the community       Patient will complete WRAP Plan, Crisis Plan and 5 Life Domains  Patient will attend 50% of groups offered on the unit  Patient will complete a goal card weekly      Outcome: Not Progressing  Goal: Recognize dysfunctional thoughts, communicate reality-based thoughts at the time of discharge  Description  Interventions:  - Provide medication and psycho-education to assist patient in compliance and developing insight into his/her illness   Outcome: Not Progressing  Goal: Complete daily ADLs, including personal hygiene independently, as able  Description  Interventions:  - Observe, teach, and assist patient with ADLS  - Monitor and promote a balance of rest/activity, with adequate nutrition and elimination   Outcome: Not Progressing     Problem: Ineffective Coping  Goal: Identifies ineffective coping skills  Outcome: Not Progressing  Goal: Identifies healthy coping skills  Outcome: Not Progressing  Goal: Demonstrates healthy coping skills  Outcome: Not Progressing  Goal: Participates in unit activities  Description  Interventions:  - Provide therapeutic environment   - Provide required programming   - Redirect inappropriate behaviors   Outcome: Not Progressing  Goal: Patient/Family participate in treatment and DC plans  Description  Interventions:  - Provide therapeutic environment  Outcome: Not Progressing  Goal: Patient/Family verbalizes awareness of resources  Outcome: Not Progressing  Goal: Understands least restrictive measures  Description  Interventions:  - Utilize least restrictive behavior  Outcome: Not Progressing     Problem: Risk for Self Injury/Neglect  Goal: Treatment Goal: Remain safe during length of stay, learn and adopt new coping skills, and be free of self-injurious ideation, impulses and acts at the time of discharge  Outcome: Not Progressing  Goal: Verbalize thoughts and feelings  Description  Interventions:  - Assess and re-assess patient's lethality and potential for self-injury  - Engage patient in 1:1 interactions, daily, for a minimum of 15 minutes  - Encourage patient to express feelings, fears, frustrations, hopes  - Establish rapport/trust with patient   Outcome: Not Progressing  Goal: Attend and participate in unit activities, including therapeutic, recreational, and educational groups  Description  Interventions:  - Provide therapeutic and educational activities daily, encourage attendance and participation, and document same in the medical record  - Obtain collateral information, encourage visitation and family involvement in care   Outcome: Not Progressing  Goal: Recognize maladaptive responses and adopt new coping mechanisms  Outcome: Not Progressing  Goal: Complete daily ADLs, including personal hygiene independently, as able  Description  Interventions:  - Observe, teach, and assist patient with ADLS  - Monitor and promote a balance of rest/activity, with adequate nutrition and elimination  Outcome: Not Progressing     Problem: Depression  Goal: Treatment Goal: Demonstrate behavioral control of depressive symptoms, verbalize feelings of improved mood/affect, and adopt new coping skills prior to discharge  Outcome: Not Progressing  Goal: Verbalize thoughts and feelings  Description  Interventions:  - Assess and re-assess patient's level of risk   - Engage patient in 1:1 interactions, daily, for a minimum of 15 minutes   - Encourage patient to express feelings, fears, frustrations, hopes   Outcome: Not Progressing  Goal: Refrain from isolation  Description  Interventions:  - Develop a trusting relationship   - Encourage socialization   Outcome: Not Progressing  Goal: Refrain from self-neglect  Outcome: Not Progressing     Problem: Anxiety  Goal: Anxiety is at manageable level  Description  Interventions:  - Assess and monitor patient's anxiety level     - Monitor for signs and symptoms of anxiety both physical and emotional (heart palpitations, chest pain, shortness of breath, headaches, nausea, feeling jumpy, restlessness, irritable, apprehensive)  - Collaborate with interdisciplinary team and initiate plan and interventions as ordered  - [place] patient to unit/surroundings  - Explain treatment plan  - Encourage participation in care  - Encourage verbalization of concerns/fears  - Identify coping mechanisms  - Assist in developing anxiety-reducing skills  - Administer/offer alternative therapies  - Limit or eliminate stimulants  Outcome: Not Progressing     Problem: PAIN - ADULT  Goal: Verbalizes/displays adequate comfort level or baseline comfort level  Description  Interventions:  - Encourage patient to monitor pain and request assistance  - Assess pain using appropriate pain scale  - Administer analgesics based on type and severity of pain and evaluate response  - Implement non-pharmacological measures as appropriate and evaluate response  - Consider cultural and social influences on pain and pain management  - Notify physician/advanced practitioner if interventions unsuccessful or patient reports new pain  Outcome: Not Progressing     Patient continues to be resistive to self care  Much prompting to get patient out of bed and into shower  Set up and assist of 1 for washing hair  Patient complied with much verbal resistance  She is isolative to room and self between meals  She ate 75% of breakfast but only 25% of lunch  Patient had not complied with program requirements on previous day and therefore was provided house menu for meals today  As part of the program patient is not at liberty to change house menu after it has been submitted  She became insistent that she cannot eat what is being provided  Patient was reminded that she has eaten much of these items in the past without difficulty  A menu is being provided at dinner for her to complete for tomorrows meals    This will be returned to dietary so that she may have her selection at breakfast   This is being done as the patient did comply with program requirements today but was in the shower when menu's were being completed  Patient did not attend groups and remained isolative to room and self  She is requesting an eye appointment with Dr Edinson Spencer and also would like an appointment with her PCP  Will address this with treatment team and case management  Patient did not utilize incentive spirometer this shift

## 2019-10-02 LAB — GLUCOSE SERPL-MCNC: 100 MG/DL (ref 65–140)

## 2019-10-02 PROCEDURE — 82948 REAGENT STRIP/BLOOD GLUCOSE: CPT

## 2019-10-02 RX ADMIN — CLOZAPINE 50 MG: 25 TABLET ORAL at 17:04

## 2019-10-02 RX ADMIN — FLUOXETINE 20 MG: 20 CAPSULE ORAL at 08:38

## 2019-10-02 RX ADMIN — FLUTICASONE FUROATE AND VILANTEROL TRIFENATATE 1 PUFF: 200; 25 POWDER RESPIRATORY (INHALATION) at 08:38

## 2019-10-02 RX ADMIN — MONTELUKAST SODIUM 10 MG: 10 TABLET, COATED ORAL at 21:28

## 2019-10-02 RX ADMIN — LEVOTHYROXINE SODIUM 125 MCG: 125 TABLET ORAL at 05:30

## 2019-10-02 RX ADMIN — TIOTROPIUM BROMIDE 18 MCG: 18 CAPSULE ORAL; RESPIRATORY (INHALATION) at 08:39

## 2019-10-02 RX ADMIN — PANTOPRAZOLE SODIUM 40 MG: 40 TABLET, DELAYED RELEASE ORAL at 05:30

## 2019-10-02 RX ADMIN — THEOPHYLLINE ANHYDROUS 200 MG: 200 CAPSULE, EXTENDED RELEASE ORAL at 08:38

## 2019-10-02 RX ADMIN — CLOZAPINE 50 MG: 25 TABLET ORAL at 21:27

## 2019-10-02 NOTE — PROGRESS NOTES
10/02/19 1000   Team Meeting   Meeting Type Daily Rounds   Team Members Present   Team Members Present Nurse; Other (Discipline and Name)   Nursing Team Member Xochitl Manning RN   Other (Discipline and Name) Irma Lazo [place]; Margaux Arboleda  CPS     Patient showered  States that the nurses are "militant " Patient is staff-splitting and demanding  Somatic  Did not attend groups

## 2019-10-02 NOTE — PROGRESS NOTES
Psychiatry Progress Note    Subjective: Interval History     Chuy Hahn showered yesterday with much encouragement  Today, she provides a dramatic account of the details  Asking to be seen by respiratory therapist, believes she should be wearing oxygen whenever she showers  States she feels "exhausted" after washing her hair and needed to promptly take a nap  She denies depression but endorses anxiety, stating she is worried about "when the next shower is coming " She is somatically preoccupied, asking me to call [facility] to determine if she could have a swallowing study  She denies HI SI SIBs  Alleges that Dr Hakan Mcneal was "steered in the wrong direction" by staff, which has resulted in many of her current issues       Behavior over the last 24 hours:  unchanged  Sleep: normal  Appetite: normal  Medication side effects: No  ROS: no complaints    Current medications:    Current Facility-Administered Medications:     acetaminophen (TYLENOL) tablet 650 mg, 650 mg, Oral, Q6H PRN, Sarah Hanna MD    albuterol (PROVENTIL HFA,VENTOLIN HFA) inhaler 2 puff, 2 puff, Inhalation, Q4H PRN, Sarah Hanna MD, 2 puff at 07/25/19 1200    aluminum-magnesium hydroxide-simethicone (MYLANTA) 200-200-20 mg/5 mL oral suspension 15 mL, 15 mL, Oral, Q4H PRN, Sarah Hanna MD    ammonium lactate (LAC-HYDRIN) 12 % lotion 1 application, 1 application, Topical, BID PRN, Sarah Hanna MD    benzonatate (TESSALON PERLES) capsule 100 mg, 100 mg, Oral, TID PRN, Sarah Hanna MD    benztropine (COGENTIN) injection 1 mg, 1 mg, Intramuscular, Q8H PRN, Sarah Hanna MD    cloZAPine (CLOZARIL) tablet 50 mg, 50 mg, Oral, BID, Sarah Hanna MD, 50 mg at 10/01/19 2107    EPINEPHrine PF (ADRENALIN) 1 mg/mL injection 0 15 mg, 0 15 mg, Intramuscular, Once PRN, Sarah Hanna MD    FLUoxetine (PROzac) capsule 20 mg, 20 mg, Oral, Daily, Sarah Hanna MD, 20 mg at 10/02/19 0838    fluticasone-vilanterol (BREO ELLIPTA) 200-25 MCG/INH inhaler 1 puff, 1 puff, Inhalation, Daily, Alirio Frazier MD, 1 puff at 10/02/19 0838    ketotifen (ZADITOR) 0 025 % ophthalmic solution 1 drop, 1 drop, Right Eye, BID PRN, Alirio Frazier MD    levothyroxine tablet 125 mcg, 125 mcg, Oral, Early Morning, Alirio Frazier MD, 125 mcg at 10/02/19 0530    magnesium hydroxide (MILK OF MAGNESIA) 400 mg/5 mL oral suspension 30 mL, 30 mL, Oral, Daily PRN, Alirio Frazier MD    montelukast (SINGULAIR) tablet 10 mg, 10 mg, Oral, HS, Alirio Frazier MD, 10 mg at 10/01/19 2107    OLANZapine (ZyPREXA) IM injection 5 mg, 5 mg, Intramuscular, Q8H PRN, Alirio Frazier MD    OLANZapine (ZyPREXA) tablet 5 mg, 5 mg, Oral, Q8H PRN, Alirio Frazier MD    pantoprazole (PROTONIX) EC tablet 40 mg, 40 mg, Oral, Early Morning, Alirio Frazier MD, 40 mg at 10/02/19 0530    polyethylene glycol (MIRALAX) packet 17 g, 17 g, Oral, Daily PRN, Alirio Frazier MD    polyvinyl alcohol (LIQUIFILM TEARS) 1 4 % ophthalmic solution 1 drop, 1 drop, Both Eyes, Q3H PRN, Alirio Frazier MD    theophylline (JEF-24) 24 hr capsule 200 mg, 200 mg, Oral, Daily, Alirio Frazier MD, 200 mg at 10/02/19 7728    tiotropium (SPIRIVA) capsule for inhaler 18 mcg, 18 mcg, Inhalation, Daily, Alirio Frazier MD, 18 mcg at 10/02/19 0839    traZODone (DESYREL) tablet 25 mg, 25 mg, Oral, HS PRN, Alirio Frazier MD    Current Problem List:    Patient Active Problem List   Diagnosis    Sepsis [facility]    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[facility]    Tobacco use disorder, continuous    Bipolar disorder ([facility]    Left hip pain    Lactic acidosis    Hypokalemia    Hypomagnesemia    Compression fracture of L4 lumbar vertebra    Thoracic compression fracture (HCC)    Ventral hernia    Parapneumonic effusion    Acute on chronic respiratory failure with hypoxia (HCC)    Chronic respiratory failure (HCC)    Hypophosphatemia    Elevated MCV    Schizoaffective disorder, bipolar type ([facility] )    Acquired hypothyroidism    Gastroesophageal reflux disease without esophagitis    Abnormal CT of the chest    Excessive cerumen in left ear canal    Lipoma of right upper extremity    Polydipsia    Localized swelling of both lower legs    Noncompliant with deep vein thrombosis (DVT) prophylaxis    Allergic conjunctivitis of right eye       Problem list reviewed 10/02/19     Objective:     Vital Signs:  Vitals:    10/01/19 1938 10/01/19 2154 10/02/19 0730 10/02/19 1500   BP: 111/62  119/75 110/69   BP Location: Left arm  Right arm Left arm   Pulse: 81 88 89 89   Resp: 16  17 16   Temp: (!) 97 2 °F (36 2 °C)  97 7 °F (36 5 °C) (!) 97 4 °F (36 3 °C)   TempSrc: Temporal   Temporal   SpO2: 96% 96%  93%   Weight:       Height:             Appearance:  age appropriate and casually dressed   Behavior:  deviant and evasive   Speech:  pressured   Mood:  anxious   Affect:  mood-congruent   Thought Process:  circumstantial   Thought Content:  delusions  grandiose and somatic   Perceptual Disturbances: None   Risk Potential: none   Sensorium:  person, place, situation and time   Cognition:  intact   Consciousness:  alert and awake    Attention: attention span and concentration were age appropriate   Intellect: average   Insight:  poor   Judgment: poor      Motor Activity: no abnormal movements       I/O Past 24 hours:  No intake/output data recorded  No intake/output data recorded  Labs:  Reviewed 10/02/19      Assessment / Plan:     [facility]ffective disorder, [facility] type ([facility]    Recommended Treatment:      Medication changes:  1) continue current medication regimen  Non-pharmacological treatments  1) Continue with group therapy, milieu therapy and occupational therapy  Safety  1) Safety/communication plan established targeting dynamic risk factors above  2) Risks, benefits, and possible side effects of medications explained to patient and patient verbalizes understanding  Counseling / Coordination of Care    Total floor / unit time spent today 20 minutes   Greater than 50% of total time was spent with the patient and / or family counseling and / or coordination of care  A description of the counseling / coordination of care  Patient's Rights, confidentiality and exceptions to confidentiality, use of automated medical record, Jeb Patrick staff access to medical record, and consent to treatment reviewed      ABILIO Lombardo

## 2019-10-02 NOTE — PLAN OF CARE
Problem: Alteration in Thoughts and Perception  Goal: Agree to be compliant with medication regime, as prescribed and report medication side effects  Description  Interventions:  - Offer appropriate PRN medication and supervise ingestion; conduct aims, as needed   Outcome: Progressing  Goal: Complete daily ADLs, including personal hygiene independently, as able  Description  Interventions:  - Observe, teach, and assist patient with ADLS  - Monitor and promote a balance of rest/activity, with adequate nutrition and elimination   Outcome: Progressing     Problem: Risk for Self Injury/Neglect  Goal: Refrain from harming self  Description  Interventions:  - Monitor patient closely, per order  - Develop a trusting relationship  - Supervise medication ingestion, monitor effects and side effects   Outcome: Progressing     Problem: Anxiety  Goal: Anxiety is at manageable level  Description  Interventions:  - Assess and monitor patient's anxiety level  - Monitor for signs and symptoms of anxiety both physical and emotional (heart palpitations, chest pain, shortness of breath, headaches, nausea, feeling jumpy, restlessness, irritable, apprehensive)  - Collaborate with interdisciplinary team and initiate plan and interventions as ordered    - [place] patient to unit/surroundings  - Explain treatment plan  - Encourage participation in care  - Encourage verbalization of concerns/fears  - Identify coping mechanisms  - Assist in developing anxiety-reducing skills  - Administer/offer alternative therapies  - Limit or eliminate stimulants  Outcome: Progressing     Problem: Alteration in Orientation  Goal: Interact with staff daily  Description  Interventions:  - Assess and re-assess patient's level of orientation  - Engage patient in 1 on 1 interactions, daily, for a minimum of 15 minutes   - Establish rapport/trust with patient   Outcome: Progressing     Problem: SAFETY ADULT  Goal: Patient will remain free of falls  Description  INTERVENTIONS:  - Assess patient frequently for physical needs  -  Identify cognitive and physical deficits and behaviors that affect risk of falls  -  [place] fall precautions as indicated by assessment   - Educate patient/family on patient safety including physical limitations  - Instruct patient to call for assistance with activity based on assessment  - Modify environment to reduce risk of injury  - Consider OT/PT consult to assist with strengthening/mobility  Outcome: Progressing     Problem: RESPIRATORY - ADULT  Goal: Achieves optimal ventilation and oxygenation  Description  INTERVENTIONS:  - Assess for changes in respiratory status  - Assess for changes in mentation and behavior  - Position to facilitate oxygenation and minimize respiratory effort  - Oxygen administration by appropriate delivery method based on oxygen saturation (per order) or ABGs  - Initiate smoking cessation education as indicated  - Encourage broncho-pulmonary hygiene including cough, deep breathe, Incentive Spirometry  - Assess the need for suctioning and aspirate as needed  - Assess and instruct to report SOB or any respiratory difficulty  - Respiratory Therapy support as indicated  Outcome: Progressing   Reyes Zendejas was isolative to her room most of the shift  Interacts with staff and select peers when she does come out for meal time and medication  Pleasant and cooperative upon approach  At [phone] she stated that she thought her sugar was low  Accucheck was 100  Vital signs were stable  Prompted for Medication at 1700  Ate 100% of her meal and then went back to her room  She had a visit with her sister  Appeared to have gone well  Clothing was brought for her  Did not attend evening group due to being asleep  Came out for HS medication at 2125  Did not eat snack  Incentive spirometer to 1250 ml's  Oxygen saturation 92% on room air prior to oxygen 1 liter via nasal cannula applied  Continue to monitor   Precautions maintained

## 2019-10-02 NOTE — PROGRESS NOTES
[facility] [place] by nursing and MHT to attend group  Pt declined and returned to her bed        10/01/19 1500   Activity/Group Checklist   Group   (Recovery Workshop)   Attendance Did not attend   Attendance Duration (min) 46-60   Affect/Mood IRMA

## 2019-10-02 NOTE — PROGRESS NOTES
Continues to disregard groups      10/02/19 1100   Activity/Group Checklist   Group   (IMR/Relapse As Part Of Recovery)   Attendance Did not attend   Attendance Duration (min) 46-60   Affect/Mood IRMA

## 2019-10-02 NOTE — PLAN OF CARE
Problem: Alteration in Thoughts and Perception  Goal: Treatment Goal: Gain control of psychotic behaviors/thinking, reduce/eliminate presenting symptoms and demonstrate improved reality functioning upon discharge  Outcome: Progressing  Goal: Verbalize thoughts and feelings  Description  Interventions:  - Promote a nonjudgmental and trusting relationship with the patient through active listening and therapeutic communication  - Assess patient's level of functioning, behavior and potential for risk  - Engage patient in 1 on 1 interactions for a minimum of 15 minutes each session  - Encourage patient to express fears, feelings, frustrations, and discuss symptoms    - [place] patient to reality, help patient recognize reality-based thinking   - Administer medications as ordered and assess for potential side effects  - Provide the patient education related to the signs and symptoms of the illness and desired effects of prescribed medications  Outcome: Progressing  Goal: Agree to be compliant with medication regime, as prescribed and report medication side effects  Description  Interventions:  - Offer appropriate PRN medication and supervise ingestion; conduct aims, as needed   Outcome: Progressing  Goal: Attend and participate in unit activities, including therapeutic, recreational, and educational groups  Description  Interventions:  - Provide therapeutic and educational activities daily, encourage attendance and participation, and document same in the medical record     CERTIFIED PEER SPECIALIST INTERVENTIONS:    Complete peer assessment with patient to assess their needs and identify their goals to complete while in the recovery program as well as once discharged into the community  Patient will complete WRAP Plan, Crisis Plan and 5 Life Domains  Patient will attend 50% of groups offered on the unit  Patient will complete a goal card weekly      Outcome: Progressing  Goal: Recognize dysfunctional thoughts, communicate reality-based thoughts at the time of discharge  Description  Interventions:  - Provide medication and psycho-education to assist patient in compliance and developing insight into his/her illness   Outcome: Progressing  Goal: Complete daily ADLs, including personal hygiene independently, as able  Description  Interventions:  - Observe, teach, and assist patient with ADLS  - Monitor and promote a balance of rest/activity, with adequate nutrition and elimination   Outcome: Progressing     Problem: Ineffective Coping  Goal: Identifies ineffective coping skills  Outcome: Progressing  Goal: Identifies healthy coping skills  Outcome: Progressing  Goal: Demonstrates healthy coping skills  Outcome: Progressing  Goal: Participates in unit activities  Description  Interventions:  - Provide therapeutic environment   - Provide required programming   - Redirect inappropriate behaviors   Outcome: Progressing  Goal: Patient/Family participate in treatment and DC plans  Description  Interventions:  - Provide therapeutic environment  Outcome: Progressing  Goal: Patient/Family verbalizes awareness of resources  Outcome: Progressing  Goal: Understands least restrictive measures  Description  Interventions:  - Utilize least restrictive behavior  Outcome: Progressing     Problem: Risk for Self Injury/Neglect  Goal: Treatment Goal: Remain safe during length of stay, learn and adopt new coping skills, and be free of self-injurious ideation, impulses and acts at the time of discharge  Outcome: Progressing  Goal: Verbalize thoughts and feelings  Description  Interventions:  - Assess and re-assess patient's lethality and potential for self-injury  - Engage patient in 1:1 interactions, daily, for a minimum of 15 minutes  - Encourage patient to express feelings, fears, frustrations, hopes  - Establish rapport/trust with patient   Outcome: Progressing  Goal: Refrain from harming self  Description  Interventions:  - Monitor patient closely, per order  - Develop a trusting relationship  - Supervise medication ingestion, monitor effects and side effects   Outcome: Progressing  Goal: Attend and participate in unit activities, including therapeutic, recreational, and educational groups  Description  Interventions:  - Provide therapeutic and educational activities daily, encourage attendance and participation, and document same in the medical record  - Obtain collateral information, encourage visitation and family involvement in care   Outcome: Progressing  Goal: Recognize maladaptive responses and adopt new coping mechanisms  Outcome: Progressing  Goal: Complete daily ADLs, including personal hygiene independently, as able  Description  Interventions:  - Observe, teach, and assist patient with ADLS  - Monitor and promote a balance of rest/activity, with adequate nutrition and elimination  Outcome: Progressing     Problem: Depression  Goal: Treatment Goal: Demonstrate behavioral control of depressive symptoms, verbalize feelings of improved mood/affect, and adopt new coping skills prior to discharge  Outcome: Progressing  Goal: Verbalize thoughts and feelings  Description  Interventions:  - Assess and re-assess patient's level of risk   - Engage patient in 1:1 interactions, daily, for a minimum of 15 minutes   - Encourage patient to express feelings, fears, frustrations, hopes   Outcome: Progressing  Goal: Refrain from isolation  Description  Interventions:  - Develop a trusting relationship   - Encourage socialization   Outcome: Progressing  Goal: Refrain from self-neglect  Outcome: Progressing     Problem: Anxiety  Goal: Anxiety is at manageable level  Description  Interventions:  - Assess and monitor patient's anxiety level  - Monitor for signs and symptoms of anxiety both physical and emotional (heart palpitations, chest pain, shortness of breath, headaches, nausea, feeling jumpy, restlessness, irritable, apprehensive)     - Collaborate with interdisciplinary team and initiate plan and interventions as ordered  - [place] patient to unit/surroundings  - Explain treatment plan  - Encourage participation in care  - Encourage verbalization of concerns/fears  - Identify coping mechanisms  - Assist in developing anxiety-reducing skills  - Administer/offer alternative therapies  - Limit or eliminate stimulants  Outcome: Progressing     Problem: Alteration in Orientation  Goal: Interact with staff daily  Description  Interventions:  - Assess and re-assess patient's level of orientation  - Engage patient in 1 on 1 interactions, daily, for a minimum of 15 minutes   - Establish rapport/trust with patient   Outcome: Progressing  Goal: Cooperate with recommended testing/procedures  Description  Interventions:  - Determine need for ancillary testing  - Observe for mental status changes  - Implement falls/precaution protocol   Outcome: Progressing     Problem: PAIN - ADULT  Goal: Verbalizes/displays adequate comfort level or baseline comfort level  Description  Interventions:  - Encourage patient to monitor pain and request assistance  - Assess pain using appropriate pain scale  - Administer analgesics based on type and severity of pain and evaluate response  - Implement non-pharmacological measures as appropriate and evaluate response  - Consider cultural and social influences on pain and pain management  - Notify physician/advanced practitioner if interventions unsuccessful or patient reports new pain  Outcome: Progressing     Problem: SAFETY ADULT  Goal: Patient will remain free of falls  Description  INTERVENTIONS:  - Assess patient frequently for physical needs  -  Identify cognitive and physical deficits and behaviors that affect risk of falls    -  [place] fall precautions as indicated by assessment   - Educate patient/family on patient safety including physical limitations  - Instruct patient to call for assistance with activity based on assessment  - Modify environment to reduce risk of injury  - Consider OT/PT consult to assist with strengthening/mobility  Outcome: Progressing     Problem: RESPIRATORY - ADULT  Goal: Achieves optimal ventilation and oxygenation  Description  INTERVENTIONS:  - Assess for changes in respiratory status  - Assess for changes in mentation and behavior  - Position to facilitate oxygenation and minimize respiratory effort  - Oxygen administration by appropriate delivery method based on oxygen saturation (per order) or ABGs  - Initiate smoking cessation education as indicated  - Encourage broncho-pulmonary hygiene including cough, deep breathe, Incentive Spirometry  - Assess the need for suctioning and aspirate as needed  - Assess and instruct to report SOB or any respiratory difficulty  - Respiratory Therapy support as indicated  Outcome: Progressing     Problem: DISCHARGE PLANNING  Goal: Discharge to home or other facility with appropriate resources  Description  INTERVENTIONS:  - Conduct assessment to determine patient/family and health care team treatment goals, and need for post-acute services based on payer coverage, community resources, and patient preferences, and barriers to discharge  - Address psychosocial, clinical, and financial barriers to discharge as identified in assessment in conjunction with the patient/family and health care team  - Assist the patient in reintegration back into the community by removing barriers which may hinder a successful discharge once deemed stable  - Arrange appropriate level of post-acute services according to patient's needs and preference and payer coverage in collaboration with the physician and health care team  - Communicate with and update the patient/family, physician, and health care team regarding progress on the discharge plan  - Arrange appropriate transportation to post-acute venues    Outcome: Progressing     Patient is alert and oriented    Polite, selectively cooperative, multiple excuses, somatic, poor insight, isolative to room and self,  She did not attend any groups  She ate 75% of breakfast then refused to eat lunch because of fear of choking  Patient was reminded that her swallowing studies were clear and that she does not choke on food  Patient became insistent and there was no changing her mind  She consumed 15% of lunch  Patient refused to attend to hygiene  She was cooperative and compliant with use of incentive spirometer  Patient has poor insight into illness    Will continue to monitor progress in recovery program

## 2019-10-02 NOTE — PLAN OF CARE
Problem: Alteration in Thoughts and Perception  Goal: Verbalize thoughts and feelings  Description  Interventions:  - Promote a nonjudgmental and trusting relationship with the patient through active listening and therapeutic communication  - Assess patient's level of functioning, behavior and potential for risk  - Engage patient in 1 on 1 interactions for a minimum of 15 minutes each session  - Encourage patient to express fears, feelings, frustrations, and discuss symptoms    - [place] patient to reality, help patient recognize reality-based thinking   - Administer medications as ordered and assess for potential side effects  - Provide the patient education related to the signs and symptoms of the illness and desired effects of prescribed medications  Outcome: Progressing  Goal: Recognize dysfunctional thoughts, communicate reality-based thoughts at the time of discharge  Description  Interventions:  - Provide medication and psycho-education to assist patient in compliance and developing insight into his/her illness   Outcome: Progressing       Visible on unit  She admits that she obsesses and worries over physical symptoms  Somatic  She verbalizes  her feelings and thoughts  Restless at times  Redirectable  Medication and meal compliant  Appetite 50 %  Pulse ox readings throughout the shift on room air were in the beginning of shift 93 % in the middle of shift 96 % and at the end of the shift 94%  No distress observed  Social  Pleasant and smiles when approached  She was compliant eating the house tray for dinner  Consumed bedtime snack  Admits rating her depression a 5 on 1-10 scale  Support offered  Entitled at times  Preoccupied with making selective choices on the menu, redirected  Denies suicidal ideations

## 2019-10-02 NOTE — PLAN OF CARE
Problem: PAIN - ADULT  Goal: Verbalizes/displays adequate comfort level or baseline comfort level  Description  Interventions:  - Encourage patient to monitor pain and request assistance  - Assess pain using appropriate pain scale  - Administer analgesics based on type and severity of pain and evaluate response  - Implement non-pharmacological measures as appropriate and evaluate response  - Consider cultural and social influences on pain and pain management  - Notify physician/advanced practitioner if interventions unsuccessful or patient reports new pain  Outcome: Progressing     Problem: SAFETY ADULT  Goal: Patient will remain free of falls  Description  INTERVENTIONS:  - Assess patient frequently for physical needs  -  Identify cognitive and physical deficits and behaviors that affect risk of falls    -  [place] fall precautions as indicated by assessment   - Educate patient/family on patient safety including physical limitations  - Instruct patient to call for assistance with activity based on assessment  - Modify environment to reduce risk of injury  - Consider OT/PT consult to assist with strengthening/mobility  Outcome: Progressing     Problem: RESPIRATORY - ADULT  Goal: Achieves optimal ventilation and oxygenation  Description  INTERVENTIONS:  - Assess for changes in respiratory status  - Assess for changes in mentation and behavior  - Position to facilitate oxygenation and minimize respiratory effort  - Oxygen administration by appropriate delivery method based on oxygen saturation (per order) or ABGs  - Initiate smoking cessation education as indicated  - Encourage broncho-pulmonary hygiene including cough, deep breathe, Incentive Spirometry  - Assess the need for suctioning and aspirate as needed  - Assess and instruct to report SOB or any respiratory difficulty  - Respiratory Therapy support as indicated  Outcome: Progressing    Alease Severs is laying in bed with her eyes closed, breath even and unlabored  O2 at 1L with humidifier via NC at HS maintained throughout the night  No respiratory distress noted  Q 15 minutes checks during rounds continues  Maintained on ongoing fall and SAFE precaution without incident on this shift  No indication of pain or discomfort noted  No behavior noted  Will continue to monitor behavior and sleep pattern

## 2019-10-03 RX ADMIN — FLUOXETINE 20 MG: 20 CAPSULE ORAL at 08:50

## 2019-10-03 RX ADMIN — THEOPHYLLINE ANHYDROUS 200 MG: 200 CAPSULE, EXTENDED RELEASE ORAL at 08:50

## 2019-10-03 RX ADMIN — FLUTICASONE FUROATE AND VILANTEROL TRIFENATATE 1 PUFF: 200; 25 POWDER RESPIRATORY (INHALATION) at 08:50

## 2019-10-03 RX ADMIN — CLOZAPINE 50 MG: 25 TABLET ORAL at 20:35

## 2019-10-03 RX ADMIN — LEVOTHYROXINE SODIUM 125 MCG: 125 TABLET ORAL at 05:50

## 2019-10-03 RX ADMIN — PANTOPRAZOLE SODIUM 40 MG: 40 TABLET, DELAYED RELEASE ORAL at 05:49

## 2019-10-03 RX ADMIN — MONTELUKAST SODIUM 10 MG: 10 TABLET, COATED ORAL at 21:18

## 2019-10-03 RX ADMIN — CLOZAPINE 50 MG: 25 TABLET ORAL at 17:09

## 2019-10-03 RX ADMIN — TIOTROPIUM BROMIDE 18 MCG: 18 CAPSULE ORAL; RESPIRATORY (INHALATION) at 08:50

## 2019-10-03 NOTE — PROGRESS NOTES
Patient attended/minimal interaction/was able to focus on topic      10/03/19 1100   Activity/Group Checklist   Group   (IMR/Dealing with Stress and Depression )   Attendance Attended   Attendance Duration (min) 46-60   Interactions Interacted appropriately   Affect/Mood Appropriate;Normal range   Goals Achieved Able to listen to others; Able to engage in interactions

## 2019-10-03 NOTE — PLAN OF CARE
Problem: Alteration in Thoughts and Perception  Goal: Verbalize thoughts and feelings  Description  Interventions:  - Promote a nonjudgmental and trusting relationship with the patient through active listening and therapeutic communication  - Assess patient's level of functioning, behavior and potential for risk  - Engage patient in 1 on 1 interactions for a minimum of 15 minutes each session  - Encourage patient to express fears, feelings, frustrations, and discuss symptoms    - [place] patient to reality, help patient recognize reality-based thinking   - Administer medications as ordered and assess for potential side effects  - Provide the patient education related to the signs and symptoms of the illness and desired effects of prescribed medications  Outcome: Progressing  Goal: Agree to be compliant with medication regime, as prescribed and report medication side effects  Description  Interventions:  - Offer appropriate PRN medication and supervise ingestion; conduct aims, as needed   Outcome: Progressing     Problem: RESPIRATORY - ADULT  Goal: Achieves optimal ventilation and oxygenation  Description  INTERVENTIONS:  - Assess for changes in respiratory status  - Assess for changes in mentation and behavior  - Position to facilitate oxygenation and minimize respiratory effort  - Oxygen administration by appropriate delivery method based on oxygen saturation (per order) or ABGs  - Initiate smoking cessation education as indicated  - Encourage broncho-pulmonary hygiene including cough, deep breathe, Incentive Spirometry  - Assess the need for suctioning and aspirate as needed  - Assess and instruct to report SOB or any respiratory difficulty  - Respiratory Therapy support as indicated  Outcome: Progressing     Problem: SLEEP DISTURBANCE  Goal: Will exhibit normal sleeping pattern  Description  Interventions:  -  Assess the patients sleep pattern, noting recent changes  - Administer medication as ordered  - Decrease environmental stimuli, including noise, as appropriate during the night  - Encourage the patient to actively participate in unit groups and or exercise during the day to enhance ability to achieve adequate sleep at night  - Assess the patient, in the morning, encouraging a description of sleep experience  Outcome: Progressing    7-3 shift note:    Patient is alert, polite, selectively coperative, multiple excuses, poor insight, isloative to room and self  She is preoccupied with choking and pending swallowing study with possible dietary consult if deemed necessary  Patient attended IMR  She ate only 25% of both meals due to fear of choking  To be Added to Behavior Plan = Patient is to shower on uneven days with set up only and assist x 1 for hair only - patient is aware   No c/o s/s pain, discomfort or distress  No evidence or verbalization of depression, SI's or HI's  Patient presents with moderate anxiety and is preoccupied with choking as noted above    Will continue to monitor progress in recovery program

## 2019-10-03 NOTE — PROGRESS NOTES
Psychiatry Progress Note    Subjective: Interval History     Rena Rausch remains somatically preoccupied, stating that she needs me to find "Giovani Nixon, the pulmonary specialist," as she feels that her oxygen levels need to be monitored throughout her next shower  She also states that she has been "holding onto a pass" because she needs portable oxygen  She also states that food is getting stuck in her esophagus periodically and "pretty soon I won't be able to eat at all " Her intake has been poor, although she selected [employer] for her snack yesterday  She is very resistive to staff encouragement and explanations  She reports minimal depression, mild anxiety  She is sleeping well  She denies psychotic symptoms or HI SI SIBs  Will request speech therapy evaluation and bedside swallow assessment, with further treatment per their recommendations       Behavior over the last 24 hours:  unchanged  Sleep: normal  Appetite: normal  Medication side effects: No  ROS: no complaints    Current medications:    Current Facility-Administered Medications:     acetaminophen (TYLENOL) tablet 650 mg, 650 mg, Oral, Q6H PRN, Roberto Colorado MD    albuterol (PROVENTIL HFA,VENTOLIN HFA) inhaler 2 puff, 2 puff, Inhalation, Q4H PRN, Roberto Colorado MD, 2 puff at 07/25/19 1200    aluminum-magnesium hydroxide-simethicone (MYLANTA) 200-200-20 mg/5 mL oral suspension 15 mL, 15 mL, Oral, Q4H PRN, Roberto Colorado MD    ammonium lactate (LAC-HYDRIN) 12 % lotion 1 application, 1 application, Topical, BID PRN, Roberto Colorado MD    benzonatate (TESSALON PERLES) capsule 100 mg, 100 mg, Oral, TID PRN, Roberto Colorado MD    benztropine (COGENTIN) injection 1 mg, 1 mg, Intramuscular, Q8H PRN, Roberto Colorado MD    cloZAPine (CLOZARIL) tablet 50 mg, 50 mg, Oral, BID, Roberto Colorado MD, 50 mg at 10/02/19 2127    EPINEPHrine PF (ADRENALIN) 1 mg/mL injection 0 15 mg, 0 15 mg, Intramuscular, Once PRN, Roberto Colorado MD    FLUoxetine (PROzac) capsule 20 mg, 20 mg, Oral, Daily, Allie Guzman MD, 20 mg at 10/03/19 0850    fluticasone-vilanterol (BREO ELLIPTA) 200-25 MCG/INH inhaler 1 puff, 1 puff, Inhalation, Daily, Allie Guzman MD, 1 puff at 10/03/19 0850    ketotifen (ZADITOR) 0 025 % ophthalmic solution 1 drop, 1 drop, Right Eye, BID PRN, Allie Guzman MD    levothyroxine tablet 125 mcg, 125 mcg, Oral, Early Morning, Allie Guzman MD, 125 mcg at 10/03/19 0550    magnesium hydroxide (MILK OF MAGNESIA) 400 mg/5 mL oral suspension 30 mL, 30 mL, Oral, Daily PRN, Allie Guzman MD    montelukast (SINGULAIR) tablet 10 mg, 10 mg, Oral, HS, Allie Guzman MD, 10 mg at 10/02/19 2128    OLANZapine (ZyPREXA) IM injection 5 mg, 5 mg, Intramuscular, Q8H PRN, Allie Guzman MD    OLANZapine (ZyPREXA) tablet 5 mg, 5 mg, Oral, Q8H PRN, Allie Guzman MD    pantoprazole (PROTONIX) EC tablet 40 mg, 40 mg, Oral, Early Morning, Allie Guzman MD, 40 mg at 10/03/19 0549    polyethylene glycol (MIRALAX) packet 17 g, 17 g, Oral, Daily PRN, Allie Guzman MD    polyvinyl alcohol (LIQUIFILM TEARS) 1 4 % ophthalmic solution 1 drop, 1 drop, Both Eyes, Q3H PRN, Allie Guzman MD    theophylline (JEF-24) 24 hr capsule 200 mg, 200 mg, Oral, Daily, Allie Guzman MD, 200 mg at 10/03/19 0850    tiotropium (SPIRIVA) capsule for inhaler 18 mcg, 18 mcg, Inhalation, Daily, Allie Guzman MD, 18 mcg at 10/03/19 0850    traZODone (DESYREL) tablet 25 mg, 25 mg, Oral, HS PRN, Allie Guzman MD    Current Problem List:    Patient Active Problem List   Diagnosis    Sepsis ([facility] )    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    Tobacco use disorder, continuous    Bipolar disorder ([facility] )    Left hip pain    Lactic acidosis    Hypokalemia    Hypomagnesemia    Compression fracture of L4 lumbar vertebra    Thoracic compression fracture (HCC)    Ventral hernia    Parapneumonic effusion    Acute on chronic respiratory failure with hypoxia (HCC)    Chronic respiratory failure (HCC)    Hypophosphatemia    Elevated MCV    Schizoaffective disorder, bipolar type ([facility] )    Acquired hypothyroidism    Gastroesophageal reflux disease without esophagitis    Abnormal CT of the chest    Excessive cerumen in left ear canal    Lipoma of right upper extremity    Polydipsia    Localized swelling of both lower legs    Noncompliant with deep vein thrombosis (DVT) prophylaxis    Allergic conjunctivitis of right eye       Problem list reviewed 10/03/19     Objective:     Vital Signs:  Vitals:    10/02/19 1500 10/02/19 1900 10/03/19 0700 10/03/19 0705   BP: 110/69 108/74 111/71 110/69   BP Location: Left arm Left arm Left arm Left arm   Pulse: 89 103 91 101   Resp: 16 16 18 18   Temp: (!) 97 4 °F (36 3 °C) 97 5 °F (36 4 °C) 97 8 °F (36 6 °C)    TempSrc: Temporal Temporal Temporal    SpO2: 93% 91%     Weight:       Height:             Appearance:  age appropriate and casually dressed   Behavior:  deviant and evasive   Speech:  pressured   Mood:  anxious   Affect:  mood-congruent   Thought Process:  circumstantial   Thought Content:  delusions  grandiose and somatic   Perceptual Disturbances: None   Risk Potential: none   Sensorium:  person, place, situation and time   Cognition:  intact   Consciousness:  alert and awake    Attention: attention span and concentration were age appropriate   Intellect: average   Insight:  poor   Judgment: poor      Motor Activity: no abnormal movements       I/O Past 24 hours:  No intake/output data recorded  No intake/output data recorded  Labs:  Reviewed 10/03/19      Assessment / Plan:     Schizoaffective disorder, bipolar type ([facility] )    Recommended Treatment:      Medication changes:  1) continue current medication regimen  Non-pharmacological treatments  1) Continue with group therapy, milieu therapy and occupational therapy  Safety  1) Safety/communication plan established targeting dynamic risk factors above      2) Risks, benefits, and possible side effects of medications explained to patient and patient verbalizes understanding  Counseling / Coordination of Care    Total floor / unit time spent today 20 minutes  Greater than 50% of total time was spent with the patient and / or family counseling and / or coordination of care  A description of the counseling / coordination of care  Patient's Rights, confidentiality and exceptions to confidentiality, use of automated medical record, [facility] staff access to medical record, and consent to treatment reviewed      ABILIO Morales

## 2019-10-03 NOTE — PLAN OF CARE
Problem: SLEEP DISTURBANCE  Goal: Will exhibit normal sleeping pattern  Description  Interventions:  -  Assess the patients sleep pattern, noting recent changes  - Administer medication as ordered  - Decrease environmental stimuli, including noise, as appropriate during the night  - Encourage the patient to actively participate in unit groups and or exercise during the day to enhance ability to achieve adequate sleep at night  - Assess the patient, in the morning, encouraging a description of sleep experience  Outcome: Progressing   Patient slept through the night  Maintained on 1L of oxygen with no issue  Patient had no signs of distress through the night  Medication compliant  Continue to monitor

## 2019-10-03 NOTE — PROGRESS NOTES
Pt not scheduled to attend        10/02/19 1400   Activity/Group Checklist   Group   (Rec[facility])   Attendance Did not attend   Attendance Duration (min) 46-60   Affect/Mood IRMA

## 2019-10-03 NOTE — PROGRESS NOTES
[facility]AD[facility] GROUP NOTE     10/03/19 1500   Activity/Group Checklist   Group Personal control  (Progressive Muscle Relaxation)   Attendance Attended   Attendance Duration (min) 16-30   Interactions Interacted appropriately   Affect/Mood Appropriate   Goals Achieved Able to listen to others; Able to engage in interactions   Primary Objectives/Concepts Covered  Mind/Body Connection  Body awareness  Importance of utilizing stillness  Solitude vs Isolation  Intentional focus through breath, movement  Use of relaxation as a preventative and   Restorative practice

## 2019-10-04 PROCEDURE — 92610 EVALUATE SWALLOWING FUNCTION: CPT

## 2019-10-04 RX ADMIN — CLOZAPINE 50 MG: 25 TABLET ORAL at 17:08

## 2019-10-04 RX ADMIN — TIOTROPIUM BROMIDE 18 MCG: 18 CAPSULE ORAL; RESPIRATORY (INHALATION) at 08:21

## 2019-10-04 RX ADMIN — FLUTICASONE FUROATE AND VILANTEROL TRIFENATATE 1 PUFF: 200; 25 POWDER RESPIRATORY (INHALATION) at 08:21

## 2019-10-04 RX ADMIN — MONTELUKAST SODIUM 10 MG: 10 TABLET, COATED ORAL at 21:35

## 2019-10-04 RX ADMIN — PANTOPRAZOLE SODIUM 40 MG: 40 TABLET, DELAYED RELEASE ORAL at 06:02

## 2019-10-04 RX ADMIN — FLUOXETINE 20 MG: 20 CAPSULE ORAL at 08:22

## 2019-10-04 RX ADMIN — THEOPHYLLINE ANHYDROUS 200 MG: 200 CAPSULE, EXTENDED RELEASE ORAL at 08:22

## 2019-10-04 RX ADMIN — CLOZAPINE 50 MG: 25 TABLET ORAL at 21:36

## 2019-10-04 RX ADMIN — LEVOTHYROXINE SODIUM 125 MCG: 125 TABLET ORAL at 06:02

## 2019-10-04 NOTE — SPEECH THERAPY NOTE
Bedside Swallow Evaluation:    Summary:  Pt presents w/ adequate oral and pharyngeal stages of swallowing with regular solids and thin liquids  The patient reports occasional feeling as if food is sticking in her throat when swallowing  Progress notes state that she feels food sticks in her esophagus and "pretty soon I won't be able to eat"  She is on a regular diet with thin liquids  The patient reports dislike in menu options and is encouraged to order softer items if desired  From H&P: Drew Jefferson is an 60 y o , female,  for 3 years D was for 25 years with a 80-year-old son on SSI benefits for about years, with high school equivalency diploma and unemployed since [address] [place] [address], living at the a:  Personal care home for about 6 weeks and admitted to inpatient psychiatric unit on from May 5, 2019 initially as transfer from medical floor where she was treated for pneumonia  She was admitted to the extended acute care psychiatric unit at [address] [place] [address] Dr dominguez [facility] under 305 involuntary status from [facility] on 07/23/2019 as transfer from the older adult inpatient psychiatric unit at [address] [place] [address] Dr dominguez [facility] in [facility]  Recommendations:  Diet: Regular-choose softer items if desired   Liquid: Thin liquids   Meds: Whole with liquids   Supervision: Patient eats in dining room  Reflux precautions    Eval only, No f/u tx indicated  Consider barium swallow to assess esophageal function   If choking or coughing occurs, consider video swallow study to assess for aspiration      Consider consult w/:  None    Reason for consult:  R/o aspiration  C/o solid food dysphagia    Precautions:  None    Current diet:  Regular with thin liquids   Premorbid diet[de-identified]  Regular with thin liquids   Previous VBS:  None  O2 requirement:  None  Voice/Speech:  WFL  Follows commands:  WFL                        Cognitive Status:  WFL  Oral [facility] exam:  Dentition: Missing most teeth with some bottom teeth  Adequate oral motor ROM and strength      Items administered:  Puree, soft solid, hard solid, thin liquids  Liquids were taken by cup       Oral stage: WFL  Lip closure: WFL  Mastication: WFL  Bolus formation: WFL  Bolus control: WFL  Transfer: WFL  Oral residue: No  Pocketing: No    Pharyngeal stage: WFL  Swallow promptness: Appears adequate  Laryngeal rise: Not palpated  Wet voice: No  Throat clear: No   Cough: No  Secondary swallows: No  Audible swallows: No  No overt s/s aspiration    Esophageal stage:  H/o GERD  C/o food sticking when swallowing    Results d/w:  Pt, nursing

## 2019-10-04 NOTE — PLAN OF CARE
Problem: SLEEP DISTURBANCE  Goal: Will exhibit normal sleeping pattern  Description  Interventions:  -  Assess the patients sleep pattern, noting recent changes  - Administer medication as ordered  - Decrease environmental stimuli, including noise, as appropriate during the night  - Encourage the patient to actively participate in unit groups and or exercise during the day to enhance ability to achieve adequate sleep at night  - Assess the patient, in the morning, encouraging a description of sleep experience  Outcome: Progressing   Patient slept through the night  Maintained on 1L of oxygen with no issue  Patient had no signs of distress through the night  Medication compliant  Attempted twice to draw her ordered CBC but was unable to obtain  Patient instructed to drink water and will have a staff member attempt after breakfast   Continue to monitor

## 2019-10-04 NOTE — PLAN OF CARE
Problem: Alteration in Thoughts and Perception  Goal: Agree to be compliant with medication regime, as prescribed and report medication side effects  Description  Interventions:  - Offer appropriate PRN medication and supervise ingestion; conduct aims, as needed   Outcome: Progressing  Goal: Attend and participate in unit activities, including therapeutic, recreational, and educational groups  Description  Interventions:  - Provide therapeutic and educational activities daily, encourage attendance and participation, and document same in the medical record     CERTIFIED PEER SPECIALIST INTERVENTIONS:    Complete peer assessment with patient to assess their needs and identify their goals to complete while in the recovery program as well as once discharged into the community  Patient will complete WRAP Plan, Crisis Plan and 5 Life Domains  Patient will attend 50% of groups offered on the unit  Patient will complete a goal card weekly  Outcome: Progressing  Goal: Complete daily ADLs, including personal hygiene independently, as able  Description  Interventions:  - Observe, teach, and assist patient with ADLS  - Monitor and promote a balance of rest/activity, with adequate nutrition and elimination   Outcome: Progressing     Pt isolative to room  Did come out and attend group  Compliant with meds  Pt did have snack  Pt reporting that she is having issues swallowing and a fear of choking so she was "glad to have snack because she needed it"  Pt 98% on RA  Has O2 concentrator on 1L via NC  No complaints or issues  Will continue to monitor

## 2019-10-04 NOTE — PLAN OF CARE
Problem: Alteration in Thoughts and Perception  Goal: Attend and participate in unit activities, including therapeutic, recreational, and educational groups  Description  Interventions:  - Provide therapeutic and educational activities daily, encourage attendance and participation, and document same in the medical record     CERTIFIED PEER SPECIALIST INTERVENTIONS:    Complete peer assessment with patient to assess their needs and identify their goals to complete while in the recovery program as well as once discharged into the community  Patient will complete WRAP Plan, Crisis Plan and 5 Life Domains  Patient will attend 50% of groups offered on the unit  Patient will complete a goal card weekly  Outcome: Not Progressing     Problem: Ineffective Coping  Goal: Demonstrates healthy coping skills  Outcome: Not Progressing   Individual was seen by speech therapy, no swallowing difficulty noted  It was suggested to do barium swallow related to c/o " food getting stuck" RN spoke to patient care manager and it is ok for her to have this done as long as she is agreeable to it  Speech therapy will be in again on 10/7/19 to see her  Individual remains isolative with minimal interactions  She attend IMR group and she is visible for meals  She presently has poor food consumption at meal time related to fear of " choking"  [place] [facility] requested and retrieved portable O2  CBC not obtained today after multiple attempts, reordered for tomorrow morning  Will continue to monitor

## 2019-10-04 NOTE — PROGRESS NOTES
Psychiatry Progress Note    Subjective: Interval History     Ilda White was observed sitting in the dining room, smiling brightly  She reports, "I got another shower again!" States she is becoming more comfortable in the "handicapped shower," and feels refreshed to be clean  She is still somatically preoccupied, asking about "Zenovia Dears" from respiratory therapy  Will see speech therapist today for an evaluation/swallow assessment, which pleases her greatly  She would like to see the eye doctor  She reports improvement in depression and anxiety  Sleeping and eating well  Denies HI SI  Medication and meal adherent      Behavior over the last 24 hours:  unchanged  Sleep: normal  Appetite: normal  Medication side effects: No  ROS: no complaints    Current medications:    Current Facility-Administered Medications:     acetaminophen (TYLENOL) tablet 650 mg, 650 mg, Oral, Q6H PRN, Shaggy Rangel MD    albuterol (PROVENTIL HFA,VENTOLIN HFA) inhaler 2 puff, 2 puff, Inhalation, Q4H PRN, Shaggy Rangel MD, 2 puff at 07/25/19 1200    aluminum-magnesium hydroxide-simethicone (MYLANTA) 200-200-20 mg/5 mL oral suspension 15 mL, 15 mL, Oral, Q4H PRN, Shaggy Rangel MD    ammonium lactate (LAC-HYDRIN) 12 % lotion 1 application, 1 application, Topical, BID PRN, Shaggy Rangel MD    benzonatate (TESSALON PERLES) capsule 100 mg, 100 mg, Oral, TID PRN, Shaggy Rangel MD    benztropine (COGENTIN) injection 1 mg, 1 mg, Intramuscular, Q8H PRN, Shaggy Rangel MD    cloZAPine (CLOZARIL) tablet 50 mg, 50 mg, Oral, BID, Shaggy Rangel MD, 50 mg at 10/03/19 2035    EPINEPHrine PF (ADRENALIN) 1 mg/mL injection 0 15 mg, 0 15 mg, Intramuscular, Once PRN, Shaggy Rangel MD    FLUoxetine (PROzac) capsule 20 mg, 20 mg, Oral, Daily, Shaggy Rangel MD, 20 mg at 10/04/19 0822    fluticasone-vilanterol (BREO ELLIPTA) 200-25 MCG/INH inhaler 1 puff, 1 puff, Inhalation, Daily, Shaggy Rangel MD, 1 puff at 10/04/19 0821    ketotifen (ZADITOR) 0 025 % ophthalmic solution 1 drop, 1 drop, Right Eye, BID PRN, Teri Huggins MD    levothyroxine tablet 125 mcg, 125 mcg, Oral, Early Morning, Teri Huggins MD, 125 mcg at 10/04/19 0602    magnesium hydroxide (MILK OF MAGNESIA) 400 mg/5 mL oral suspension 30 mL, 30 mL, Oral, Daily PRN, Teri Huggins MD    montelukast (SINGULAIR) tablet 10 mg, 10 mg, Oral, HS, Teri Huggins MD, 10 mg at 10/03/19 2118    OLANZapine (ZyPREXA) IM injection 5 mg, 5 mg, Intramuscular, Q8H PRN, Teri Huggins MD    OLANZapine (ZyPREXA) tablet 5 mg, 5 mg, Oral, Q8H PRN, Teri Huggins MD    pantoprazole (PROTONIX) EC tablet 40 mg, 40 mg, Oral, Early Morning, Teri Huggins MD, 40 mg at 10/04/19 0602    polyethylene glycol (MIRALAX) packet 17 g, 17 g, Oral, Daily PRN, Teri Huggins MD    polyvinyl alcohol (LIQUIFILM TEARS) 1 4 % ophthalmic solution 1 drop, 1 drop, Both Eyes, Q3H PRN, Teri Huggins MD    theophylline (JEF-24) 24 hr capsule 200 mg, 200 mg, Oral, Daily, Teri Huggins MD, 200 mg at 10/04/19 8725    tiotropium [facility]) capsule for inhaler 18 mcg, 18 mcg, Inhalation, Daily, Teri Huggins MD, 18 mcg at 10/04/19 1688    traZODone (DESYREL) tablet 25 mg, 25 mg, Oral, HS PRN, Teri Huggins MD    Current Problem List:    Patient Active Problem List   Diagnosis    Sepsis ([facility]    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[address] )    Tobacco use disorder, continuous    Bipolar disorder ([address]    Left hip pain    Lactic acidosis    Hypokalemia    Hypomagnesemia    Compression fracture of L4 lumbar vertebra    Thoracic compression fracture (HCC)    Ventral hernia    Parapneumonic effusion    Acute on chronic respiratory failure with hypoxia (HCC)    Chronic respiratory failure (HCC)    Hypophosphatemia    Elevated MCV    Schizoaffective disorder, bipolar type (HCC)    Acquired hypothyroidism    Gastroesophageal reflux disease without esophagitis    Abnormal CT of the chest    Excessive cerumen in left ear canal    Lipoma of right upper extremity    Polydipsia    Localized swelling of both lower legs    Noncompliant with deep vein thrombosis (DVT) prophylaxis    Allergic conjunctivitis of right eye       Problem list reviewed 10/04/19     Objective:     Vital Signs:  Vitals:    10/03/19 1610 10/03/19 2005 10/04/19 0730 10/04/19 0732   BP: 109/77 116/82 111/82 111/67   BP Location: Right arm Left arm Left arm Left arm   Pulse: 79 91 89 95   Resp: 18 18 17    Temp: (!) 97 1 °F (36 2 °C) 99 °F (37 2 °C) 98 5 °F (36 9 °C)    TempSrc: Temporal Temporal Temporal    SpO2: 90% 92%     Weight:       Height:             Appearance:  age appropriate and casually dressed   Behavior:  deviant and evasive   Speech:  pressured   Mood:  anxious   Affect:  mood-congruent   Thought Process:  circumstantial   Thought Content:  delusions  grandiose and somatic   Perceptual Disturbances: None   Risk Potential: none   Sensorium:  person, place, situation and time   Cognition:  intact   Consciousness:  alert and awake    Attention: attention span and concentration were age appropriate   Intellect: average   Insight:  poor   Judgment: poor      Motor Activity: no abnormal movements       I/O Past 24 hours:  No intake/output data recorded  No intake/output data recorded  Labs:  Reviewed 10/04/19      Assessment / Plan:     Schizoaffective disorder, bipolar type ([facility] )    Recommended Treatment:      Medication changes:  1) continue current medication regimen  Non-pharmacological treatments  1) Continue with group therapy, milieu therapy and occupational therapy  Safety  1) Safety/communication plan established targeting dynamic risk factors above  2) Risks, benefits, and possible side effects of medications explained to patient and patient verbalizes understanding  Counseling / Coordination of Care    Total floor / unit time spent today 20 minutes  Greater than 50% of total time was spent with the patient and / or family counseling and / or coordination of care   A description of the counseling / coordination of care  Patient's Rights, confidentiality and exceptions to confidentiality, use of automated medical record, [facility] staff access to medical record, and consent to treatment reviewed      ABILIO Hwang

## 2019-10-04 NOTE — PROGRESS NOTES
10/04/19 1000   Team Meeting   Meeting Type Daily Rounds   Team Members Present   Team Members Present Nurse; Other (Discipline and Name)   Nursing Team Member Xochitl Manning RN   Other (Discipline and Name) Iram Lazo [place]; Sergio Gibbs, PhD, LCSW     Patient had speech consult with katty alvares this morning  No not for further intervention was determined at this time  Patient would like eye doctor appointment  Remains somatic and unmotivated

## 2019-10-04 NOTE — PLAN OF CARE
Problem: Alteration in Thoughts and Perception  Goal: Agree to be compliant with medication regime, as prescribed and report medication side effects  Description  Interventions:  - Offer appropriate PRN medication and supervise ingestion; conduct aims, as needed   Outcome: Progressing     Problem: Alteration in Thoughts and Perception  Goal: Attend and participate in unit activities, including therapeutic, recreational, and educational groups  Description  Interventions:  - Provide therapeutic and educational activities daily, encourage attendance and participation, and document same in the medical record     CERTIFIED PEER SPECIALIST INTERVENTIONS:    Complete peer assessment with patient to assess their needs and identify their goals to complete while in the recovery program as well as once discharged into the community  Patient will complete WRAP Plan, Crisis Plan and 5 Life Domains  Patient will attend 50% of groups offered on the unit  Patient will complete a goal card weekly  Outcome: Not Progressing     Problem: Depression  Goal: Refrain from isolation  Description  Interventions:  - Develop a trusting relationship   - Encourage socialization   Outcome: Not Progressing       Chanda Rod was isolative this shift visible only for meals and meds appears disheveled  Pt was med and meal compliant no prompting required; Individual continues to be focus thoughts on chocking while eating supper  Encouraged to walk laps around unit and socialize with staff and peer with pt denying offer  No behaviors on shift  Will continue to monitor

## 2019-10-05 LAB
BASOPHILS # BLD AUTO: 0.1 THOUSANDS/ΜL (ref 0–0.1)
BASOPHILS NFR BLD AUTO: 1 % (ref 0–1)
EOSINOPHIL # BLD AUTO: 0.2 THOUSAND/ΜL (ref 0–0.4)
EOSINOPHIL NFR BLD AUTO: 2 % (ref 0–6)
ERYTHROCYTE [DISTWIDTH] IN BLOOD BY AUTOMATED COUNT: 13.4 %
HCT VFR BLD AUTO: 43.6 % (ref 36–46)
HGB BLD-MCNC: 14.3 G/DL (ref 12–16)
LYMPHOCYTES # BLD AUTO: 2.6 THOUSANDS/ΜL (ref 0.5–4)
LYMPHOCYTES NFR BLD AUTO: 32 % (ref 25–45)
MCH RBC QN AUTO: 30.9 PG (ref 26–34)
MCHC RBC AUTO-ENTMCNC: 32.9 G/DL (ref 31–36)
MCV RBC AUTO: 94 FL (ref 80–100)
MONOCYTES # BLD AUTO: 0.8 THOUSAND/ΜL (ref 0.2–0.9)
MONOCYTES NFR BLD AUTO: 10 % (ref 1–10)
NEUTROPHILS # BLD AUTO: 4.7 THOUSANDS/ΜL (ref 1.8–7.8)
NEUTS SEG NFR BLD AUTO: 56 % (ref 45–65)
PLATELET # BLD AUTO: 249 THOUSANDS/UL (ref 150–450)
PMV BLD AUTO: 9.1 FL (ref 8.9–12.7)
RBC # BLD AUTO: 4.65 MILLION/UL (ref 4–5.2)
WBC # BLD AUTO: 8.3 THOUSAND/UL (ref 4.5–11)

## 2019-10-05 PROCEDURE — 99231 SBSQ HOSP IP/OBS SF/LOW 25: CPT | Performed by: NURSE PRACTITIONER

## 2019-10-05 PROCEDURE — 85025 COMPLETE CBC W/AUTO DIFF WBC: CPT | Performed by: NURSE PRACTITIONER

## 2019-10-05 RX ADMIN — THEOPHYLLINE ANHYDROUS 200 MG: 200 CAPSULE, EXTENDED RELEASE ORAL at 09:12

## 2019-10-05 RX ADMIN — LEVOTHYROXINE SODIUM 125 MCG: 125 TABLET ORAL at 06:40

## 2019-10-05 RX ADMIN — MONTELUKAST SODIUM 10 MG: 10 TABLET, COATED ORAL at 21:31

## 2019-10-05 RX ADMIN — PANTOPRAZOLE SODIUM 40 MG: 40 TABLET, DELAYED RELEASE ORAL at 06:40

## 2019-10-05 RX ADMIN — TIOTROPIUM BROMIDE 18 MCG: 18 CAPSULE ORAL; RESPIRATORY (INHALATION) at 09:13

## 2019-10-05 RX ADMIN — CLOZAPINE 50 MG: 25 TABLET ORAL at 21:31

## 2019-10-05 RX ADMIN — FLUOXETINE 20 MG: 20 CAPSULE ORAL at 09:12

## 2019-10-05 RX ADMIN — CLOZAPINE 50 MG: 25 TABLET ORAL at 17:08

## 2019-10-05 RX ADMIN — FLUTICASONE FUROATE AND VILANTEROL TRIFENATATE 1 PUFF: 200; 25 POWDER RESPIRATORY (INHALATION) at 09:13

## 2019-10-05 NOTE — PROGRESS NOTES
02 applied at 1lt when this writer returned to room to verify settings observed 02 at 5lt reset to 1lt

## 2019-10-05 NOTE — PLAN OF CARE
Problem: Alteration in Thoughts and Perception  Goal: Agree to be compliant with medication regime, as prescribed and report medication side effects  Description  Interventions:  - Offer appropriate PRN medication and supervise ingestion; conduct aims, as needed   Outcome: Progressing  Goal: Complete daily ADLs, including personal hygiene independently, as able  Description  Interventions:  - Observe, teach, and assist patient with ADLS  - Monitor and promote a balance of rest/activity, with adequate nutrition and elimination   Outcome: Progressing     Problem: Anxiety  Goal: Anxiety is at manageable level  Description  Interventions:  - Assess and monitor patient's anxiety level  - Monitor for signs and symptoms of anxiety both physical and emotional (heart palpitations, chest pain, shortness of breath, headaches, nausea, feeling jumpy, restlessness, irritable, apprehensive)  - Collaborate with interdisciplinary team and initiate plan and interventions as ordered  - [place] patient to unit/surroundings  - Explain treatment plan  - Encourage participation in care  - Encourage verbalization of concerns/fears  - Identify coping mechanisms  - Assist in developing anxiety-reducing skills  - Administer/offer alternative therapies  - Limit or eliminate stimulants  Outcome: Progressing     Problem: Alteration in Orientation  Goal: Interact with staff daily  Description  Interventions:  - Assess and re-assess patient's level of orientation  - Engage patient in 1 on 1 interactions, daily, for a minimum of 15 minutes   - Establish rapport/trust with patient   Outcome: Progressing     Problem: SAFETY ADULT  Goal: Patient will remain free of falls  Description  INTERVENTIONS:  - Assess patient frequently for physical needs  -  Identify cognitive and physical deficits and behaviors that affect risk of falls    -  [place] fall precautions as indicated by assessment   - Educate patient/family on patient safety including physical limitations  - Instruct patient to call for assistance with activity based on assessment  - Modify environment to reduce risk of injury  - Consider OT/PT consult to assist with strengthening/mobility  Outcome: Progressing     Problem: RESPIRATORY - ADULT  Goal: Achieves optimal ventilation and oxygenation  Description  INTERVENTIONS:  - Assess for changes in respiratory status  - Assess for changes in mentation and behavior  - Position to facilitate oxygenation and minimize respiratory effort  - Oxygen administration by appropriate delivery method based on oxygen saturation (per order) or ABGs  - Initiate smoking cessation education as indicated  - Encourage broncho-pulmonary hygiene including cough, deep breathe, Incentive Spirometry  - Assess the need for suctioning and aspirate as needed  - Assess and instruct to report SOB or any respiratory difficulty  - Respiratory Therapy support as indicated  Outcome: Progressing     Problem: Alteration in Thoughts and Perception  Goal: Attend and participate in unit activities, including therapeutic, recreational, and educational groups  Description  Interventions:  - Provide therapeutic and educational activities daily, encourage attendance and participation, and document same in the medical record     CERTIFIED PEER SPECIALIST INTERVENTIONS:    Complete peer assessment with patient to assess their needs and identify their goals to complete while in the recovery program as well as once discharged into the community  Patient will complete WRAP Plan, Crisis Plan and 5 Life Domains  Patient will attend 50% of groups offered on the unit  Patient will complete a goal card weekly  Outcome: Not Progressing     Problem: Depression  Goal: Refrain from isolation  Description  Interventions:  - Develop a trusting relationship   - Encourage socialization   Outcome: Not Progressing     Davidson Jorge has been isolative to her room most of the shift   Social with staff and select peers  Incentive spirometer was done with her  She was able to get between 6265-4888 ml's  Needed prompting for her medication prior to eating her meal  Mentioned again about her "swallowing issues " Was able to eat egg salad sandwich without coughing/choking  CBC results were normal  Her ANC was 4 70  No complaint of shortness of breath  Oxygen saturation on RA was 91%  Encouraged to come out of her room more often, but remains in her room  Did not attend evening group  Ate snack and then took HS medication before going to her room  Pulse ox was 93% on RA  Continue to monitor  Precautions maintained

## 2019-10-05 NOTE — PLAN OF CARE
Problem: Alteration in Thoughts and Perception  Goal: Treatment Goal: Gain control of psychotic behaviors/thinking, reduce/eliminate presenting symptoms and demonstrate improved reality functioning upon discharge  Outcome: Progressing  Goal: Verbalize thoughts and feelings  Description  Interventions:  - Promote a nonjudgmental and trusting relationship with the patient through active listening and therapeutic communication  - Assess patient's level of functioning, behavior and potential for risk  - Engage patient in 1 on 1 interactions for a minimum of 15 minutes each session  - Encourage patient to express fears, feelings, frustrations, and discuss symptoms    - [place] patient to reality, help patient recognize reality-based thinking   - Administer medications as ordered and assess for potential side effects  - Provide the patient education related to the signs and symptoms of the illness and desired effects of prescribed medications  Outcome: Progressing  Goal: Agree to be compliant with medication regime, as prescribed and report medication side effects  Description  Interventions:  - Offer appropriate PRN medication and supervise ingestion; conduct aims, as needed   Outcome: Progressing     Problem: Alteration in Thoughts and Perception  Goal: Attend and participate in unit activities, including therapeutic, recreational, and educational groups  Description  Interventions:  - Provide therapeutic and educational activities daily, encourage attendance and participation, and document same in the medical record     CERTIFIED PEER SPECIALIST INTERVENTIONS:    Complete peer assessment with patient to assess their needs and identify their goals to complete while in the recovery program as well as once discharged into the community  Patient will complete WRAP Plan, Crisis Plan and 5 Life Domains  Patient will attend 50% of groups offered on the unit  Patient will complete a goal card weekly      Outcome: Not Progressing  Goal: Recognize dysfunctional thoughts, communicate reality-based thoughts at the time of discharge  Description  Interventions:  - Provide medication and psycho-education to assist patient in compliance and developing insight into his/her illness   Outcome: Not Progressing  Goal: Complete daily ADLs, including personal hygiene independently, as able  Description  Interventions:  - Observe, teach, and assist patient with ADLS  - Monitor and promote a balance of rest/activity, with adequate nutrition and elimination   Outcome: Not Progressing     Problem: Ineffective Coping  Goal: Identifies ineffective coping skills  Outcome: Not Progressing  Goal: Identifies healthy coping skills  Outcome: Not Progressing  Goal: Demonstrates healthy coping skills  Outcome: Not Progressing  Goal: Participates in unit activities  Description  Interventions:  - Provide therapeutic environment   - Provide required programming   - Redirect inappropriate behaviors   Outcome: Not Progressing  Goal: Patient/Family participate in treatment and DC plans  Description  Interventions:  - Provide therapeutic environment  Outcome: Not Progressing  Goal: Patient/Family verbalizes awareness of resources  Outcome: Not Progressing  Goal: Understands least restrictive measures  Description  Interventions:  - Utilize least restrictive behavior  Outcome: Not Progressing    Patient ate 75% breakfast, 25% of lunch but continues preoccupied with choking   Swallowing study completed by speech therapy  No notable issues recommend order for barioum swallow  Patients behaviors were polite, selectively cooperative, has multiple excuses for not getting out of bed or attending to hygiene  She is somatic, has poor insight into her illness  She is isolative to room and self  This shift she was out of bed for shower with set up and hair washed with assist x1 with much persuading needed  She was compliant with incentive spirometer    The patient also verbalizes belief that she could have  when o2 tubing popped off her concentrator and she walked to nurses station to report it during the night  Patient was reassured that this was not the case and that she was able to walk to nurses station indicated that she was not in significant distress  Patient did not attend any groups this shift    Will continue to monitor progress in recovery program

## 2019-10-05 NOTE — PROGRESS NOTES
Progress Note - Behavioral Health   Nina Bello 60 y o  female MRN: [id number]  Unit/Bed#: [facility] 110-02 Encounter: [id number]    Assessment/Plan   Principal Problem:    Schizoaffective disorder, bipolar type ([facility] )  Active Problems:    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[facility] )    Acquired hypothyroidism    Gastroesophageal reflux disease without esophagitis      Subjective:"I feel restful, I just had a shower"  Patient was seen today for continuation of care, records reviewed and  patient was discussed with the morning case review team Patient was seen in bed due to feeling tired but was ij good spirits, smiling appropiately  She presented calm, cooperative and pleasant  She shared she has seen the speech a therapist and is waiting for a barium swallow tests to be done next week  She has been on a soft diet due to impaired swallowing  As per nursing she has been using a incentive spirometer at night with 1 liter O2  She reports good appetite and sleeping well  Atennding groups  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  Visible in the milieu  Patient denies endorsing any suicidal or homicidal ideation  Does not seem to be experiencing any manic or psychotic symptoms during our encounter  Remains medication compliance  Denies any side effects from medications      Current Medications:  Current Facility-Administered Medications   Medication Dose Route Frequency    acetaminophen (TYLENOL) tablet 650 mg  650 mg Oral Q6H PRN    albuterol (PROVENTIL HFA,VENTOLIN HFA) inhaler 2 puff  2 puff Inhalation Q4H PRN    aluminum-magnesium hydroxide-simethicone (MYLANTA) 200-200-20 mg/5 mL oral suspension 15 mL  15 mL Oral Q4H PRN    ammonium lactate (LAC-HYDRIN) 12 % lotion 1 application  1 application Topical BID PRN    benzonatate (TESSALON PERLES) capsule 100 mg  100 mg Oral TID PRN    benztropine (COGENTIN) injection 1 mg  1 mg Intramuscular Q8H PRN    cloZAPine (CLOZARIL) tablet 50 mg  50 mg Oral BID    EPINEPHrine PF (ADRENALIN) 1 mg/mL injection 0 15 mg  0 15 mg Intramuscular Once PRN    FLUoxetine (PROzac) capsule 20 mg  20 mg Oral Daily    fluticasone-vilanterol (BREO ELLIPTA) 200-25 MCG/INH inhaler 1 puff  1 puff Inhalation Daily    ketotifen (ZADITOR) 0 025 % ophthalmic solution 1 drop  1 drop Right Eye BID PRN    levothyroxine tablet 125 mcg  125 mcg Oral Early Morning    magnesium hydroxide (MILK OF MAGNESIA) 400 mg/5 mL oral suspension 30 mL  30 mL Oral Daily PRN    montelukast (SINGULAIR) tablet 10 mg  10 mg Oral HS    OLANZapine (ZyPREXA) IM injection 5 mg  5 mg Intramuscular Q8H PRN    OLANZapine (ZyPREXA) tablet 5 mg  5 mg Oral Q8H PRN    pantoprazole (PROTONIX) EC tablet 40 mg  40 mg Oral Early Morning    polyethylene glycol (MIRALAX) packet 17 g  17 g Oral Daily PRN    polyvinyl alcohol (LIQUIFILM TEARS) 1 4 % ophthalmic solution 1 drop  1 drop Both Eyes Q3H PRN    theophylline (JEF-24) 24 hr capsule 200 mg  200 mg Oral Daily    tiotropium (SPIRIVA) capsule for inhaler 18 mcg  18 mcg Inhalation Daily    traZODone (DESYREL) tablet 25 mg  25 mg Oral HS PRN       Behavioral Health Medications: all current active meds have been reviewed and continue current psychiatric medications  Vitals:  Vitals:    10/05/19 0700   BP: 122/77   Pulse: 90   Resp: 20   Temp: (!) 97 4 °F (36 3 °C)       Laboratory results:    I have personally reviewed all pertinent laboratory/tests results    Most Recent Labs:   Lab Results   Component Value Date    WBC 7 10 09/28/2019    RBC 4 44 09/28/2019    HGB 13 8 09/28/2019    HCT 40 6 09/28/2019     09/28/2019    RDW 13 8 09/28/2019    NEUTROABS 3 40 09/28/2019    SODIUM 138 08/21/2019    K 4 3 08/21/2019     08/21/2019    CO2 28 08/21/2019    BUN 17 08/21/2019    CREATININE 0 68 08/21/2019    GLUCOSE 85 05/18/2015    GLUC 114 (H) 08/21/2019    GLUF 84 05/31/2019    CALCIUM 9 5 08/21/2019    AST 19 08/21/2019    ALT 11 08/21/2019    ALKPHOS 105 08/21/2019    TP 6 9 08/21/2019    ALB 3 7 08/21/2019    TBILI 0 60 08/21/2019    CHOLESTEROL 261 (H) 07/24/2019    HDL 52 07/24/2019    TRIG 154 (H) 07/24/2019    LDLCALC 178 (H) 07/24/2019    NONHDLC 209 07/24/2019    LITHIUM <0 2 (L) 05/01/2019    AMMONIA 13 02/22/2016    UBV6EHXITSDV 0 985 08/21/2019    FREET4 1 4 05/12/2015    RPR Non-Reactive 05/02/2019    HGBA1C 5 5 01/17/2019     01/17/2019       Psychiatric Review of Systems:    Behavior over the last 24 hours:  improved  Sleep: normal  Appetite: normal  Medication side effects: No  ROS: no complaints    Mental Status Evaluation:    Appearance:  casually dressed   Behavior:  pleasant, cooperative, calm   Speech:  soft, tangential   Mood:  improved, euthymic   Affect:  constricted   Thought Process:  circumstantial   Associations: circumstantial associations   Thought Content:  intrusive, obsessive thoughts, preoccupied with swalloing test and    Perceptual Disturbances: denies auditory hallucinations when asked, denies auditory or visual hallucinations when asked   Risk Potential: Suicidal ideation - None  Homicidal ideation - None  Potential for aggression - No   Sensorium:  oriented to person, place and time/date   Memory:  recent and remote memory grossly intact   Consciousness:  alert and awake   Attention: attention span and concentration are improving   Insight:  limited   Judgment: limited   Gait/Station: in bed, unable to assess   Motor Activity: no abnormal movements       Progress Toward Goals:     Improving    Assessment/Plan   Principal Problem:    Schizoaffective disorder, bipolar type (HCC)  Active Problems:    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[facility] )    Acquired hypothyroidism    Gastroesophageal reflux disease without esophagitis      Recommended Treatment: Continue with group therapy, milieu therapy and occupational therapy  Treatment plan and medication changes discussed and per the attending physician the plan is: 1  Behavioral Health checks every 7 minutes  2  Continue with current medication regimen  3 Labs still pending- need to be drawn  Risks / Benefits of Treatment:     Risks, benefits, and possible side effects of medications explained to patient and patient verbalizes understanding and agreement for treatment  Counseling / Coordination of Care:     Patient's progress reviewed with nursing staff  Medications, treatment progress and treatment plan reviewed with patient  Supportive counseling provided to the patient            Merlin Ek

## 2019-10-05 NOTE — PLAN OF CARE
Problem: RESPIRATORY - ADULT  Goal: Achieves optimal ventilation and oxygenation  Description  INTERVENTIONS:  - Assess for changes in respiratory status  - Assess for changes in mentation and behavior  - Position to facilitate oxygenation and minimize respiratory effort  - Oxygen administration by appropriate delivery method based on oxygen saturation (per order) or ABGs  - Initiate smoking cessation education as indicated  - Encourage broncho-pulmonary hygiene including cough, deep breathe, Incentive Spirometry  - Assess the need for suctioning and aspirate as needed  - Assess and instruct to report SOB or any respiratory difficulty  - Respiratory Therapy support as indicated  Outcome: Progressing     Problem: SLEEP DISTURBANCE  Goal: Will exhibit normal sleeping pattern  Description  Interventions:  -  Assess the patients sleep pattern, noting recent changes  - Administer medication as ordered  - Decrease environmental stimuli, including noise, as appropriate during the night  - Encourage the patient to actively participate in unit groups and or exercise during the day to enhance ability to achieve adequate sleep at night  - Assess the patient, in the morning, encouraging a description of sleep experience  Outcome: Prince Delatorre was asleep until about 0330 when she came to the nurses station and stated that her "oxygen tubing came off the machine " Staff reconnected the tubing and she remains on 1 liter of humidified oxygen via nasal cannula  No complaint of respiratory issues  Respirations easy and non labored  Continue to monitor  Precautions maintained

## 2019-10-06 LAB — GLUCOSE SERPL-MCNC: 90 MG/DL (ref 65–140)

## 2019-10-06 PROCEDURE — 82948 REAGENT STRIP/BLOOD GLUCOSE: CPT

## 2019-10-06 RX ADMIN — CLOZAPINE 50 MG: 25 TABLET ORAL at 16:54

## 2019-10-06 RX ADMIN — LEVOTHYROXINE SODIUM 125 MCG: 125 TABLET ORAL at 06:15

## 2019-10-06 RX ADMIN — MONTELUKAST SODIUM 10 MG: 10 TABLET, COATED ORAL at 21:23

## 2019-10-06 RX ADMIN — PANTOPRAZOLE SODIUM 40 MG: 40 TABLET, DELAYED RELEASE ORAL at 06:16

## 2019-10-06 RX ADMIN — FLUOXETINE 20 MG: 20 CAPSULE ORAL at 08:57

## 2019-10-06 RX ADMIN — TIOTROPIUM BROMIDE 18 MCG: 18 CAPSULE ORAL; RESPIRATORY (INHALATION) at 08:57

## 2019-10-06 RX ADMIN — FLUTICASONE FUROATE AND VILANTEROL TRIFENATATE 1 PUFF: 200; 25 POWDER RESPIRATORY (INHALATION) at 08:56

## 2019-10-06 RX ADMIN — CLOZAPINE 50 MG: 25 TABLET ORAL at 21:23

## 2019-10-06 RX ADMIN — THEOPHYLLINE ANHYDROUS 200 MG: 200 CAPSULE, EXTENDED RELEASE ORAL at 08:57

## 2019-10-06 NOTE — PROGRESS NOTES
At [address] [place] [address], Roosevelt Galavizster awake from her sleep, approach the nurses station asking for soda  She stated that she hypoglycemic and she knows that because she did not eat supper  When ask why she did not eat? Roosevelt Galavizster claim that she has trouble swallowing  And that the speech therapist stated that it doesn't matter what the test says, continue doing what youre doing  So she did not eat her dinner last night  Offer her milk and vanilla pudding, she instanting on getting soda  Educated her about proper food that maintain blood sugar level  Took her blood sugar and she wanted to see what it was, then claim she can't see  Her accucheck is 90mg/dl  She started to walk away, reminded her that I have the milk and vanilla pudding, she refused and stated, "Well my sugar is not low I don't need them"  She return back to her room for the second time  Will continue to monitor

## 2019-10-06 NOTE — PLAN OF CARE
Problem: Alteration in Thoughts and Perception  Goal: Verbalize thoughts and feelings  Description  Interventions:  - Promote a nonjudgmental and trusting relationship with the patient through active listening and therapeutic communication  - Assess patient's level of functioning, behavior and potential for risk  - Engage patient in 1 on 1 interactions for a minimum of 15 minutes each session  - Encourage patient to express fears, feelings, frustrations, and discuss symptoms    - [place] patient to reality, help patient recognize reality-based thinking   - Administer medications as ordered and assess for potential side effects  - Provide the patient education related to the signs and symptoms of the illness and desired effects of prescribed medications  Outcome: Progressing  Goal: Agree to be compliant with medication regime, as prescribed and report medication side effects  Description  Interventions:  - Offer appropriate PRN medication and supervise ingestion; conduct aims, as needed   Outcome: Progressing     Problem: Alteration in Thoughts and Perception  Goal: Treatment Goal: Gain control of psychotic behaviors/thinking, reduce/eliminate presenting symptoms and demonstrate improved reality functioning upon discharge  Outcome: Not Progressing  Goal: Attend and participate in unit activities, including therapeutic, recreational, and educational groups  Description  Interventions:  - Provide therapeutic and educational activities daily, encourage attendance and participation, and document same in the medical record     CERTIFIED PEER SPECIALIST INTERVENTIONS:    Complete peer assessment with patient to assess their needs and identify their goals to complete while in the recovery program as well as once discharged into the community  Patient will complete WRAP Plan, Crisis Plan and 5 Life Domains  Patient will attend 50% of groups offered on the unit  Patient will complete a goal card weekly  Outcome: Not Progressing  Goal: Recognize dysfunctional thoughts, communicate reality-based thoughts at the time of discharge  Description  Interventions:  - Provide medication and psycho-education to assist patient in compliance and developing insight into his/her illness   Outcome: Not Progressing  Goal: Complete daily ADLs, including personal hygiene independently, as able  Description  Interventions:  - Observe, teach, and assist patient with ADLS  - Monitor and promote a balance of rest/activity, with adequate nutrition and elimination   Outcome: Not Progressing    Patient is Pleasant, selectively cooperative, multiple excuses, somatic, poor insight, isolative to room and self, remains in bed napping, compliant with incentive spirometer, preoccupied with dietary consult which will not happen until swallowing study complete  She did not attend community meeting  She is social with select peers, primarily roommate  She denies depression and anxiety  She refused lunch due to fear of choking, yet she was reminded this is the same meal she orders everyday and does not eat  Order for barium swallowing study is pending, as per speech therapy assessment  Will continue to monitor progress in recovery program     OTHER: ok for Mike De Dios if agreeable, will need order, speech therapy will see her again Monday

## 2019-10-06 NOTE — PROGRESS NOTES
Progress Note - Behavioral Health   Cathy Arroyo 60 y o  female MRN: [id number]  Unit/Bed#: [facility] 110-02 Encounter: [id number]    Assessment/Plan   Principal Problem:    Schizoaffective disorder, bipolar type ([facility] [address] )  Active Problems:    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[facility] )    Acquired hypothyroidism    Gastroesophageal reflux disease without esophagitis      Subjective:Patient was seen today for continuation of care, records reviewed and  patient was discussed with the morning case review team   Patient was seen in her bed where she was maintained down  She reports she sleeps well, continues to be preoccupied about her swallowing test   She reported she was seen by the speech therapist this morning and she was told there was nothing wrong  She wonders if she has to see ENT doctor  Believes that Clozaril could be causing the feeling of having food stuck in her throat  She was reassured that this was not a common side effect from this medication  She also complained of low BS last night and declined offeredmilk as she reports Dr Shani Lyles  suggested drinking juice instead  She was calm place and cooperative  Patient denies endorsing any suicidal or homicidal ideation  Does not seem to be experiencing any manic or psychotic symptoms during our encounter  Remains medication compliance  Denies any acute or common side effects from medications  On Clozaril and Prozac       Current Medications:  Current Facility-Administered Medications   Medication Dose Route Frequency    acetaminophen (TYLENOL) tablet 650 mg  650 mg Oral Q6H PRN    albuterol (PROVENTIL HFA,VENTOLIN HFA) inhaler 2 puff  2 puff Inhalation Q4H PRN    aluminum-magnesium hydroxide-simethicone (MYLANTA) 200-200-20 mg/5 mL oral suspension 15 mL  15 mL Oral Q4H PRN    ammonium lactate (LAC-HYDRIN) 12 % lotion 1 application  1 application Topical BID PRN    benzonatate (TESSALON PERLES) capsule 100 mg  100 mg Oral TID PRN    benztropine (COGENTIN) injection 1 mg  1 mg Intramuscular Q8H PRN    cloZAPine (CLOZARIL) tablet 50 mg  50 mg Oral BID    EPINEPHrine PF (ADRENALIN) 1 mg/mL injection 0 15 mg  0 15 mg Intramuscular Once PRN    FLUoxetine (PROzac) capsule 20 mg  20 mg Oral Daily    fluticasone-vilanterol (BREO ELLIPTA) 200-25 MCG/INH inhaler 1 puff  1 puff Inhalation Daily    ketotifen (ZADITOR) 0 025 % ophthalmic solution 1 drop  1 drop Right Eye BID PRN    levothyroxine tablet 125 mcg  125 mcg Oral Early Morning    magnesium hydroxide (MILK OF MAGNESIA) 400 mg/5 mL oral suspension 30 mL  30 mL Oral Daily PRN    montelukast (SINGULAIR) tablet 10 mg  10 mg Oral HS    OLANZapine (ZyPREXA) IM injection 5 mg  5 mg Intramuscular Q8H PRN    OLANZapine (ZyPREXA) tablet 5 mg  5 mg Oral Q8H PRN    pantoprazole (PROTONIX) EC tablet 40 mg  40 mg Oral Early Morning    polyethylene glycol (MIRALAX) packet 17 g  17 g Oral Daily PRN    polyvinyl alcohol (LIQUIFILM TEARS) 1 4 % ophthalmic solution 1 drop  1 drop Both Eyes Q3H PRN    theophylline (JEF-24) 24 hr capsule 200 mg  200 mg Oral Daily    tiotropium (SPIRIVA) capsule for inhaler 18 mcg  18 mcg Inhalation Daily    traZODone (DESYREL) tablet 25 mg  25 mg Oral HS PRN       Behavioral Health Medications: all current active meds have been reviewed and continue current psychiatric medications  Vitals:  Vitals:    10/06/19 0700   BP: 106/73   Pulse: 100   Resp: 15   Temp: (!) 96 9 °F (36 1 °C)   SpO2:        Laboratory results:    I have personally reviewed all pertinent laboratory/tests results    Most Recent Labs:   Lab Results   Component Value Date    WBC 8 30 10/05/2019    RBC 4 65 10/05/2019    HGB 14 3 10/05/2019    HCT 43 6 10/05/2019     10/05/2019    RDW 13 4 10/05/2019    NEUTROABS 4 70 10/05/2019    SODIUM 138 08/21/2019    K 4 3 08/21/2019     08/21/2019    CO2 28 08/21/2019    BUN 17 08/21/2019    CREATININE 0 68 08/21/2019    GLUCOSE 85 05/18/2015    GLUC 114 (H) 08/21/2019    GLUF 84 05/31/2019    CALCIUM 9 5 08/21/2019    AST 19 08/21/2019    ALT 11 08/21/2019    ALKPHOS 105 08/21/2019    TP 6 9 08/21/2019    ALB 3 7 08/21/2019    TBILI 0 60 08/21/2019    CHOLESTEROL 261 (H) 07/24/2019    HDL 52 07/24/2019    TRIG 154 (H) 07/24/2019    LDLCALC 178 (H) 07/24/2019    NONHDLC 209 07/24/2019    LITHIUM <0 2 (L) 05/01/2019    AMMONIA 13 02/22/2016    VXJ1UXKMDRXF 0 985 08/21/2019    FREET4 1 4 05/12/2015    RPR Non-Reactive 05/02/2019    HGBA1C 5 5 01/17/2019     01/17/2019       Psychiatric Review of Systems:    Behavior over the last 24 hours:  unchanged  Sleep: normal  Appetite: normal  Medication side effects: No  ROS: no complaints        Mental Status Evaluation:     Appearance:  casually dressed   Behavior:  pleasant, cooperative, calm   Speech:  soft, tangential   Mood:  improved, euthymic   Affect:  constricted   Thought Process:  circumstantial   Associations: circumstantial associations   Thought Content:  intrusive, obsessive thoughts, preoccupied with swallowing test and    Perceptual Disturbances: denies auditory hallucinations when asked, denies auditory or visual hallucinations when asked   Risk Potential: Suicidal ideation - None  Homicidal ideation - None  Potential for aggression - No   Sensorium:  oriented to person, place and time/date   Memory:  recent and remote memory grossly intact   Consciousness:  alert and awake   Attention: attention span and concentration are improving   Insight:  limited   Judgment: limited   Gait/Station: in bed, unable to assess   Motor Activity: no abnormal movements         Progress Toward Goals:     Improving    Assessment/Plan   Principal Problem:    Schizoaffective disorder, bipolar type (HCC)  Active Problems:    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[facility] [address] )    Acquired hypothyroidism    Gastroesophageal reflux disease without esophagitis      Recommended Treatment: Continue with group therapy, milieu therapy and occupational therapy   Treatment plan and medication changes discussed and per the attending physician the plan is: 1  Behavioral Health checks every 7 minutes  2  Continue with current medication regimen  3 Labs results pending-     Risks / Benefits of Treatment:     Risks, benefits, and possible side effects of medications explained to patient and patient verbalizes understanding and agreement for treatment  Counseling / Coordination of Care:     Patient's progress reviewed with nursing staff  Medications, treatment progress and treatment plan reviewed with patient  Supportive counseling provided to the patient            Alfredo Felix

## 2019-10-06 NOTE — PLAN OF CARE
Problem: PAIN - ADULT  Goal: Verbalizes/displays adequate comfort level or baseline comfort level  Description  Interventions:  - Encourage patient to monitor pain and request assistance  - Assess pain using appropriate pain scale  - Administer analgesics based on type and severity of pain and evaluate response  - Implement non-pharmacological measures as appropriate and evaluate response  - Consider cultural and social influences on pain and pain management  - Notify physician/advanced practitioner if interventions unsuccessful or patient reports new pain  Outcome: Progressing     Problem: SAFETY ADULT  Goal: Patient will remain free of falls  Description  INTERVENTIONS:  - Assess patient frequently for physical needs  -  Identify cognitive and physical deficits and behaviors that affect risk of falls    -  [place] fall precautions as indicated by assessment   - Educate patient/family on patient safety including physical limitations  - Instruct patient to call for assistance with activity based on assessment  - Modify environment to reduce risk of injury  - Consider OT/PT consult to assist with strengthening/mobility  Outcome: Progressing     Problem: DISCHARGE PLANNING  Goal: Discharge to home or other facility with appropriate resources  Description  INTERVENTIONS:  - Conduct assessment to determine patient/family and health care team treatment goals, and need for post-acute services based on payer coverage, community resources, and patient preferences, and barriers to discharge  - Address psychosocial, clinical, and financial barriers to discharge as identified in assessment in conjunction with the patient/family and health care team  - Assist the patient in reintegration back into the community by removing barriers which may hinder a successful discharge once deemed stable  - Arrange appropriate level of post-acute services according to patient's needs and preference and payer coverage in collaboration with the physician and health care team  - Communicate with and update the patient/family, physician, and health care team regarding progress on the discharge plan  - Arrange appropriate transportation to post-acute venues    Outcome: Progressing     Problem: SLEEP DISTURBANCE  Goal: Will exhibit normal sleeping pattern  Description  Interventions:  -  Assess the patients sleep pattern, noting recent changes  - Administer medication as ordered  - Decrease environmental stimuli, including noise, as appropriate during the night  - Encourage the patient to actively participate in unit groups and or exercise during the day to enhance ability to achieve adequate sleep at night  - Assess the patient, in the morning, encouraging a description of sleep experience  Outcome: Schuyler Lopez is laying in bed with her eyes closed, breath even and unlabored  O2 at 1L with humidifier via NC at HS maintained throughout the night  No respiratory distress noted  Q 15 minutes checks during rounds continues  Maintained on ongoing fall and SAFE precaution without incident on this shift  At [address] [place] [address], Tyler Mixer reported to RN that she hypoglycemic and wanted soda to her low blood sugar  No s/s noted  Refer her to talk with her nurse when she came back from break  Returned back to laying down in her bed  No indication of pain or discomfort noted  No behavior noted  Will continue to monitor behavior and sleep pattern

## 2019-10-07 LAB — GLUCOSE SERPL-MCNC: 94 MG/DL (ref 65–140)

## 2019-10-07 PROCEDURE — 82948 REAGENT STRIP/BLOOD GLUCOSE: CPT

## 2019-10-07 RX ADMIN — FLUTICASONE FUROATE AND VILANTEROL TRIFENATATE 1 PUFF: 200; 25 POWDER RESPIRATORY (INHALATION) at 08:17

## 2019-10-07 RX ADMIN — PANTOPRAZOLE SODIUM 40 MG: 40 TABLET, DELAYED RELEASE ORAL at 06:22

## 2019-10-07 RX ADMIN — THEOPHYLLINE ANHYDROUS 200 MG: 200 CAPSULE, EXTENDED RELEASE ORAL at 08:17

## 2019-10-07 RX ADMIN — MONTELUKAST SODIUM 10 MG: 10 TABLET, COATED ORAL at 21:48

## 2019-10-07 RX ADMIN — CLOZAPINE 50 MG: 25 TABLET ORAL at 16:56

## 2019-10-07 RX ADMIN — LEVOTHYROXINE SODIUM 125 MCG: 125 TABLET ORAL at 06:22

## 2019-10-07 RX ADMIN — FLUOXETINE 20 MG: 20 CAPSULE ORAL at 08:17

## 2019-10-07 RX ADMIN — CLOZAPINE 50 MG: 25 TABLET ORAL at 20:45

## 2019-10-07 RX ADMIN — TIOTROPIUM BROMIDE 18 MCG: 18 CAPSULE ORAL; RESPIRATORY (INHALATION) at 08:17

## 2019-10-07 NOTE — PROGRESS NOTES
10/07/19 0900   Team Meeting   Meeting Type Tx Team Meeting   Initial Conference Date 10/07/19   Next Conference Date 10/14/19   Team Members Present   Team Members Present Nurse; Other (Discipline and Name)   Nursing Team Member Kiya Bass   Other (Discipline and Name) DIANA Villegas; Reshma Sheffield, [facility]   Patient/Family Present   Patient Present Yes   Patient's Family Present No     Patient attended treatment team meeting this morning without self-assessment  Patient's group attendance for last week was 27%, which was acknowledged, as patient does not consistently attend groups  Patient is unsatisfied with the swallow evaluation performed and would like something more conclusive  Patient has difficulty accepting when there is no need for further medical intervention for one of her somatic complaints  Patient was reminded that her anxiety can sometimes make her medical complaints seem more serious than they are, but that she can focus on trying to trust the professionals who making these conclusions  Patient did interact with Natalie Muse from [facility] about returning to [facility] which she states is what she would like to do  Patient asked if she could skip her shower today and was encouraged to still shower even though she was not feeling well  Team and patient completed risk assessment and the patient did not verbalize any desire to elope from the program   Patient verbalized understanding of consequences of eloping from treatment while on a commitment  Patient verbalized no further questions or concerns at the conclusion of the meeting  Next team meeting scheduled for 10/14

## 2019-10-07 NOTE — PROGRESS NOTES
10/07/19 0800   Team Meeting   Meeting Type Daily Rounds   Team Members Present   Team Members Present Nurse; Other (Discipline and Name)   Nursing Team Member Carol Aguirre RN   Other (Discipline and Name) Jarred Abler, DIANA     Patient remains somatic, especially about swallowing, despite having a swallow eval performed by speech pathology  Patient is due to shower today

## 2019-10-07 NOTE — PROGRESS NOTES
10/04/19 1100   Activity/Group Checklist   Group Other (Comment)  (IMR - Weekly Goal Review)   Attendance Did not attend     Patient did not attend group, nor did she complete a goal card this past week  Patient was encouraged to attend, but remains fixated on swallow concerns

## 2019-10-07 NOTE — PLAN OF CARE
Problem: Alteration in Thoughts and Perception  Goal: Agree to be compliant with medication regime, as prescribed and report medication side effects  Description  Interventions:  - Offer appropriate PRN medication and supervise ingestion; conduct aims, as needed   Outcome: Progressing     Problem: Risk for Self Injury/Neglect  Goal: Refrain from harming self  Description  Interventions:  - Monitor patient closely, per order  - Develop a trusting relationship  - Supervise medication ingestion, monitor effects and side effects   Outcome: Progressing     Problem: Alteration in Thoughts and Perception  Goal: Recognize dysfunctional thoughts, communicate reality-based thoughts at the time of discharge  Description  Interventions:  - Provide medication and psycho-education to assist patient in compliance and developing insight into his/her illness   Outcome: Not Progressing  Goal: Complete daily ADLs, including personal hygiene independently, as able  Description  Interventions:  - Observe, teach, and assist patient with ADLS  - Monitor and promote a balance of rest/activity, with adequate nutrition and elimination   Outcome: Not Progressing   Patient is pleasant and cooperative this shift  She is mostly isolative to her room  Continues to be somatic in regards to swallowing  She seems to c/o this when she does not want to eat the food that is on her tray  She eats all snacks including chips and peanut butter crackers without any problem  Patient attended treatment team this morning  She was reminded that it is an odd day and as per her behavioral plan she must shower today  She states "I will do it later"  Patient reminded and encouraged multiple times throughout the day to shower and she had a different excuse every time why should could not shower at those times  Will continue to monitor

## 2019-10-07 NOTE — PLAN OF CARE
Problem: Alteration in Thoughts and Perception  Goal: Verbalize thoughts and feelings  Description  Interventions:  - Promote a nonjudgmental and trusting relationship with the patient through active listening and therapeutic communication  - Assess patient's level of functioning, behavior and potential for risk  - Engage patient in 1 on 1 interactions for a minimum of 15 minutes each session  - Encourage patient to express fears, feelings, frustrations, and discuss symptoms    - [place] patient to reality, help patient recognize reality-based thinking   - Administer medications as ordered and assess for potential side effects  - Provide the patient education related to the signs and symptoms of the illness and desired effects of prescribed medications  Outcome: Progressing     Problem: Alteration in Thoughts and Perception  Goal: Treatment Goal: Gain control of psychotic behaviors/thinking, reduce/eliminate presenting symptoms and demonstrate improved reality functioning upon discharge  Outcome: Not Progressing  Goal: Attend and participate in unit activities, including therapeutic, recreational, and educational groups  Description  Interventions:  - Provide therapeutic and educational activities daily, encourage attendance and participation, and document same in the medical record     CERTIFIED PEER SPECIALIST INTERVENTIONS:    Complete peer assessment with patient to assess their needs and identify their goals to complete while in the recovery program as well as once discharged into the community  Patient will complete WRAP Plan, Crisis Plan and [facility] [employer]  Patient will attend 50% of groups offered on the unit  Patient will complete a goal card weekly      Outcome: Not Progressing  Goal: Complete daily ADLs, including personal hygiene independently, as able  Description  Interventions:  - Observe, teach, and assist patient with ADLS  - Monitor and promote a balance of rest/activity, with adequate nutrition and elimination   Outcome: Not Progressing     Problem: Depression  Goal: Refrain from isolation  Description  Interventions:  - Develop a trusting relationship   - Encourage socialization   Outcome: Not Progressing     Pt isolative to room and self  Refused group  Compliant with meds without prompting  Pt ate 30% of dinner, preoccupied with fear of choking  Attempted to reassure pt repeatedly  Pt is very manipulative  Tries to find out what is documented in the system and what is told to the physicians, what is passed on to other staff, etc  Pt initially requested turkey be pureed, when informed that they do not just "chop" up the turkey, it is made into a paste like applesauce, pt declined having it pureed  Pt stated her mashed potatoes were too dry to eat, so she only ate applesauce, milk, and juice for dinner  However, pt had visit with sister today, sister brought in pastry which pt ate without issue, however, pt wont report eating these things  For snack, staff provided cookies, banana, and milk  Pt only reported to staff that she had banana and milk because their was no ice cream available for her to eat  Pt reports brother in law had swallowing issues before having a stroke, and requiring pureed foods which makes her fearful and "petrified"  Preoccupied with barium swallow, insists that she needs it and it should be ordered as an emergency  Informed pt based on the assessment by Speech, no follow up was needed, however, due to pt requests, speech is supposed to assess tomorrow  Pt continuously states she thinks her BS is low and wants it checked because she is tired  Advised pt she needs to eat more protein  Pt o2 at 96% on RA before administering O2 concentrator  Compliant with IS  Reached 1000mL  Will continue to monitor

## 2019-10-07 NOTE — PLAN OF CARE
Problem: SLEEP DISTURBANCE  Goal: Will exhibit normal sleeping pattern  Description  Interventions:  -  Assess the patients sleep pattern, noting recent changes  - Administer medication as ordered  - Decrease environmental stimuli, including noise, as appropriate during the night  - Encourage the patient to actively participate in unit groups and or exercise during the day to enhance ability to achieve adequate sleep at night  - Assess the patient, in the morning, encouraging a description of sleep experience  Outcome: Progressing     Pt is laying in bed with eyes closed, breath even and unlabored  Q 15 minutes checks during round continues  Remains on 1L of O2 via NC  Maintained SAFE precaution throughout shift  No PRN needed for insomnia  No behavior noted  Will continue to monitor behavior and sleep pattern

## 2019-10-07 NOTE — PROGRESS NOTES
Patient continues to refuse to shower, this time she states "My blood pressure is too low, I need to drink some water"  This writer had her get OOB and sit in the dining room and gave her a cup of water  This writer stated to her that she has until 1900 this evening to shower because that is when bins are collected  Patient attempting to staff split  Will continue to monitor

## 2019-10-07 NOTE — PROGRESS NOTES
Psychiatry Progress Note    Subjective: Interval History     Kathrine Cabello remains somatically preoccupied, states, "I have been waiting to see you all day!" Reports she needs a barium swallow evaluation, as suggested by speech therapy, as she continues to feel that food is getting lodged in her esophagus  As a result, she states she is avoiding foods that could become "stuck," and is starting to feel "weaker and weaker " She reports improvement in sleep quality and depressive symptoms  She presents as anxious  She denies any homicidal or suicidal ideations  Unable to attend majority of groups due to self-limiting physical issues  When she is not perseverating in regards to her swallowing concerns, she is asking for Margene Claude from respiratory therapy      Behavior over the last 24 hours:  unchanged  Sleep: normal  Appetite: normal  Medication side effects: No  ROS: no complaints    Current medications:    Current Facility-Administered Medications:     acetaminophen (TYLENOL) tablet 650 mg, 650 mg, Oral, Q6H PRN, Vincent Olivares MD    albuterol (PROVENTIL HFA,VENTOLIN HFA) inhaler 2 puff, 2 puff, Inhalation, Q4H PRN, Vincent Olivares MD, 2 puff at 07/25/19 1200    aluminum-magnesium hydroxide-simethicone (MYLANTA) 200-200-20 mg/5 mL oral suspension 15 mL, 15 mL, Oral, Q4H PRN, Vincent Olivares MD    ammonium lactate (LAC-HYDRIN) 12 % lotion 1 application, 1 application, Topical, BID PRN, Vincent Olivares MD    benzonatate (TESSALON PERLES) capsule 100 mg, 100 mg, Oral, TID PRN, Vincent Olivares MD    benztropine (COGENTIN) injection 1 mg, 1 mg, Intramuscular, Q8H PRN, Vincent Olivares MD    cloZAPine (CLOZARIL) tablet 50 mg, 50 mg, Oral, BID, Vincent Olivares MD, 50 mg at 10/06/19 2123    EPINEPHrine PF (ADRENALIN) 1 mg/mL injection 0 15 mg, 0 15 mg, Intramuscular, Once PRN, Vincent Olivares MD    FLUoxetine (PROzac) capsule 20 mg, 20 mg, Oral, Daily, Vincent Olivares MD, 20 mg at 10/07/19 0817    fluticasone-vilanterol (BREO ELLIPTA) 200-25 MCG/INH inhaler 1 puff, 1 puff, Inhalation, Daily, Teri Huggins MD, 1 puff at 10/07/19 0817    ketotifen (ZADITOR) 0 025 % ophthalmic solution 1 drop, 1 drop, Right Eye, BID PRN, Teri Huggins MD    levothyroxine tablet 125 mcg, 125 mcg, Oral, Early Morning, Teri Huggins MD, 125 mcg at 10/07/19 0622    magnesium hydroxide (MILK OF MAGNESIA) 400 mg/5 mL oral suspension 30 mL, 30 mL, Oral, Daily PRN, Teri Huggins MD    montelukast (SINGULAIR) tablet 10 mg, 10 mg, Oral, HS, Teri Huggins MD, 10 mg at 10/06/19 2123    OLANZapine (ZyPREXA) IM injection 5 mg, 5 mg, Intramuscular, Q8H PRN, Teri Huggins MD    OLANZapine (ZyPREXA) tablet 5 mg, 5 mg, Oral, Q8H PRN, Teri Huggins MD    pantoprazole (PROTONIX) EC tablet 40 mg, 40 mg, Oral, Early Morning, Teri Huggins MD, 40 mg at 10/07/19 0622    polyethylene glycol (MIRALAX) packet 17 g, 17 g, Oral, Daily PRN, Teri Huggins MD    polyvinyl alcohol (LIQUIFILM TEARS) 1 4 % ophthalmic solution 1 drop, 1 drop, Both Eyes, Q3H PRN, Teri Huggins MD    theophylline (JEF-24) 24 hr capsule 200 mg, 200 mg, Oral, Daily, Teri Huggins MD, 200 mg at 10/07/19 0817    tiotropium (SPIRIVA) capsule for inhaler 18 mcg, 18 mcg, Inhalation, Daily, Teri Huggins MD, 18 mcg at 10/07/19 0817    traZODone (DESYREL) tablet 25 mg, 25 mg, Oral, HS PRN, Teri Huggins MD    Current Problem List:    Patient Active Problem List   Diagnosis    [facility]epsis [facility]    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[facility]    Tobacco use disorder, continuous    Bipolar disorder ([facility] )    Left hip pain    Lactic acidosis    Hypokalemia    Hypomagnesemia    Compression fracture of L4 lumbar vertebra    Thoracic compression fracture (HCC)    Ventral hernia    Parapneumonic effusion    Acute on chronic respiratory failure with hypoxia (HCC)    Chronic respiratory failure (HCC)    Hypophosphatemia    Elevated MCV    Schizoaffective disorder, bipolar type ([facility] )    Acquired hypothyroidism    Gastroesophageal reflux disease without esophagitis    Abnormal CT of the chest    Excessive cerumen in left ear canal    Lipoma of right upper extremity    Polydipsia    Localized swelling of both lower legs    Noncompliant with deep vein thrombosis (DVT) prophylaxis    Allergic conjunctivitis of right eye       Problem list reviewed 10/07/19     Objective:     Vital Signs:  Vitals:    10/06/19 1624 10/06/19 1900 10/06/19 2130 10/07/19 0900   BP: 116/72 98/62  99/54   BP Location: Left arm Left arm  Left arm   Pulse: 80 93 102 104   Resp: 18 15  18   Temp: 98 9 °F (37 2 °C) 97 7 °F (36 5 °C)  (!) 97 1 °F (36 2 °C)   TempSrc: Temporal Temporal  Tympanic   SpO2:  96% 96%    Weight:       Height:             Appearance:  age appropriate and casually dressed   Behavior:  deviant and evasive   Speech:  pressured   Mood:  anxious   Affect:  mood-congruent   Thought Process:  circumstantial   Thought Content:  delusions  grandiose and somatic   Perceptual Disturbances: None   Risk Potential: none   Sensorium:  person, place, situation and time   Cognition:  intact   Consciousness:  alert and awake    Attention: attention span and concentration were age appropriate   Intellect: average   Insight:  poor   Judgment: poor      Motor Activity: no abnormal movements       I/O Past 24 hours:  No intake/output data recorded  No intake/output data recorded  Labs:  Reviewed 10/07/19      Assessment / Plan:     Schizoaffective disorder, bipolar type ([facility] )    Recommended Treatment:      Medication changes:  1) continue current medication regimen  Non-pharmacological treatments  1) Continue with group therapy, milieu therapy and occupational therapy  Safety  1) Safety/communication plan established targeting dynamic risk factors above  2) Risks, benefits, and possible side effects of medications explained to patient and patient verbalizes understanding  Counseling / Coordination of Care    Total floor / unit time spent today 20 minutes   Greater than 50% of total time was spent with the patient and / or family counseling and / or coordination of care  A description of the counseling / coordination of care  Patient's Rights, confidentiality and exceptions to confidentiality, use of automated medical record, [facility] staff access to medical record, and consent to treatment reviewed      Sol ABILIO Raya

## 2019-10-07 NOTE — PROGRESS NOTES
10/07/19 1100   Activity/Group Checklist   Group Other (Comment)  (IMR - Weekly Goal Setting)   Attendance Did not attend     Patient was encouraged to attend group this morning, but declined  Patient did not respond to encouragement and prompting to attend group, when writer approached her at her bedside

## 2019-10-08 ENCOUNTER — APPOINTMENT (INPATIENT)
Dept: RADIOLOGY | Facility: HOSPITAL | Age: 64
DRG: 750 | End: 2019-10-08
Payer: COMMERCIAL

## 2019-10-08 PROCEDURE — 92611 MOTION FLUOROSCOPY/SWALLOW: CPT

## 2019-10-08 PROCEDURE — 74230 X-RAY XM SWLNG FUNCJ C+: CPT

## 2019-10-08 PROCEDURE — G8997 SWALLOW GOAL STATUS: HCPCS

## 2019-10-08 PROCEDURE — G8996 SWALLOW CURRENT STATUS: HCPCS

## 2019-10-08 RX ADMIN — PANTOPRAZOLE SODIUM 40 MG: 40 TABLET, DELAYED RELEASE ORAL at 05:35

## 2019-10-08 RX ADMIN — FLUOXETINE 20 MG: 20 CAPSULE ORAL at 08:40

## 2019-10-08 RX ADMIN — FLUTICASONE FUROATE AND VILANTEROL TRIFENATATE 1 PUFF: 200; 25 POWDER RESPIRATORY (INHALATION) at 08:40

## 2019-10-08 RX ADMIN — TIOTROPIUM BROMIDE 18 MCG: 18 CAPSULE ORAL; RESPIRATORY (INHALATION) at 08:40

## 2019-10-08 RX ADMIN — CLOZAPINE 50 MG: 25 TABLET ORAL at 21:00

## 2019-10-08 RX ADMIN — MONTELUKAST SODIUM 10 MG: 10 TABLET, COATED ORAL at 21:20

## 2019-10-08 RX ADMIN — THEOPHYLLINE ANHYDROUS 200 MG: 200 CAPSULE, EXTENDED RELEASE ORAL at 08:40

## 2019-10-08 RX ADMIN — CLOZAPINE 50 MG: 25 TABLET ORAL at 17:07

## 2019-10-08 RX ADMIN — LEVOTHYROXINE SODIUM 125 MCG: 125 TABLET ORAL at 05:35

## 2019-10-08 NOTE — PLAN OF CARE
Problem: Alteration in Thoughts and Perception  Goal: Complete daily ADLs, including personal hygiene independently, as able  Description  Interventions:  - Observe, teach, and assist patient with ADLS  - Monitor and promote a balance of rest/activity, with adequate nutrition and elimination   Outcome: Progressing     Problem: Risk for Self Injury/Neglect  Goal: Refrain from harming self  Description  Interventions:  - Monitor patient closely, per order  - Develop a trusting relationship  - Supervise medication ingestion, monitor effects and side effects   Outcome: Progressing     Problem: Depression  Goal: Refrain from isolation  Description  Interventions:  - Develop a trusting relationship   - Encourage socialization   Outcome: Progressing     Problem: Alteration in Orientation  Goal: Interact with staff daily  Description  Interventions:  - Assess and re-assess patient's level of orientation  - Engage patient in 1 on 1 interactions, daily, for a minimum of 15 minutes   - Establish rapport/trust with patient   Outcome: Progressing     Problem: SAFETY ADULT  Goal: Patient will remain free of falls  Description  INTERVENTIONS:  - Assess patient frequently for physical needs  -  Identify cognitive and physical deficits and behaviors that affect risk of falls    -  [place] fall precautions as indicated by assessment   - Educate patient/family on patient safety including physical limitations  - Instruct patient to call for assistance with activity based on assessment  - Modify environment to reduce risk of injury  - Consider OT/PT consult to assist with strengthening/mobility  Outcome: Progressing     Problem: RESPIRATORY - ADULT  Goal: Achieves optimal ventilation and oxygenation  Description  INTERVENTIONS:  - Assess for changes in respiratory status  - Assess for changes in mentation and behavior  - Position to facilitate oxygenation and minimize respiratory effort  - Oxygen administration by appropriate delivery method based on oxygen saturation (per order) or ABGs  - Initiate smoking cessation education as indicated  - Encourage broncho-pulmonary hygiene including cough, deep breathe, Incentive Spirometry  - Assess the need for suctioning and aspirate as needed  - Assess and instruct to report SOB or any respiratory difficulty  - Respiratory Therapy support as indicated  Outcome: Progressing     Problem: Alteration in Thoughts and Perception  Goal: Attend and participate in unit activities, including therapeutic, recreational, and educational groups  Description  Interventions:  - Provide therapeutic and educational activities daily, encourage attendance and participation, and document same in the medical record     CERTIFIED PEER SPECIALIST INTERVENTIONS:    Complete peer assessment with patient to assess their needs and identify their goals to complete while in the recovery program as well as once discharged into the community  Patient will complete WRAP Plan, Crisis Plan and [facility] Life Domains  Patient will attend 50% of groups offered on the unit  Patient will complete a goal card weekly  Outcome: Not Progressing     Katty Bowles was in her room lying in bed at the beginning of the shift  Interacts with select peers  Pleasant and cooperative upon approach  Good eye contact  Needs encouragement to come out of her room  Took pills in pudding  Swallowed them without difficulty  Ate 75% of her meal  Stayed in the dining room for approximately 45 minutes after eating and then went to her room  Did not attend evening group  Came for HS medication after eating snack  Oxygen saturation was 97% on RA  Oxygen applied at 1 liter via nasal cannula as ordered for HS  Continue to monitor  Precautions maintained

## 2019-10-08 NOTE — PROCEDURES
Video Swallow Study      Patient Name: Faustino Monsivais  DJZYQ'[id number] Date: 10/8/2019        Past Medical History  Past Medical History:   Diagnosis Date    Acid reflux     Anxiety     Asthma     Bipolar 1 disorder (HCC)     Chronic pain disorder     Chronic respiratory failure (HCC)     Compression fracture of fourth lumbar vertebra (HCC)     COPD (chronic obstructive pulmonary disease) (HCC)     Depression     GERD (gastroesophageal reflux disease)     History of home oxygen therapy     Hypothyroidism     Lipoma of upper extremity     Psychiatric illness     Schizoaffective disorder (HCC)     Substance abuse ([facility] )     Nicotine    Thoracic compression fracture (HCC)     Ventral hernia         Video Barium Swallow Study    Summary:  Pt presents with mild oral dysphagia, characterized by mildly prolonged mastication and mildly reduced bolus control  Pharyngeal swallow appears WNL  There was no penetration/aspiration  A brief scan/screen of the esophagus, revealed suspected dysmotility, with retention observed throughout the esophagus intermittently, and questionable LES dysfunction  Pt could be at risk for bottom up aspiration with larger amounts of PO intake 2* esophageal issues  Pt would benefit from GI consult, for EGD and/or barium swallow  Pt expresses significant anxiety and fear that she will choke, and wants to remain on a pureed diet for now, until seen by GI  Images are on PACS for review  Recommendations:  Diet:  Pureed, per pt choice  When pt is ready, recommended upgrade to mech soft, with possibility of upgrade to dysphagia 3 following GI consult     Liquids: Thin, cup or straw  Meds: Crush if possible, or whole in applesauce/pudding if unable to crush  Strategies: Eat slowly, masticate fully prior to swallow, small bites, alternate solids and liquids, smaller more frequent meals  Upright position  F/u ST tx: Yes  Therapy Prognosis: Fair  Prognosis considerations: Pt anxiety, age, therapeutic potential  Intermittent Supervision  Aspiration Precautions  Reflux Precautions, remain upright at least 30 mins after meals  Consider consult with: GI, for barium swallow/EGD  Results reviewed with: pt, nursing  Aspiration precautions posted  If a dedicated assessment of the esophagus is desired, consider esophagram/barium swallow or EGD  Patient's goal:  Pt wants to know why she is feeling like food is "getting stuck"  Goals:  Pt will tolerate least restrictive diet w/out s/s aspiration or oral/pharyngeal difficulties  Pt is a 61 y/o female, seen today for VBS  Pt reports that approximately a week ago, she felt like she choked on a hamburger, and since then food feels like it "gets stuck" on the back of her tongue and in her throat  She expresses much anxiety about choking  She does not report any difficulty drinking liquids  She also reports feeling full quickly  Pt was seen for b/s swallow eval last week, with the following results/recs:  Summary:  Pt presents w/ adequate oral and pharyngeal stages of swallowing with regular solids and thin liquids  The patient reports occasional feeling as if food is sticking in her throat when swallowing  Progress notes state that she feels food sticks in her esophagus and "pretty soon I won't be able to eat"  She is on a regular diet with thin liquids  The patient reports dislike in menu options and is encouraged to order softer items if desired  Recommendations:  Diet: Regular-choose softer items if desired   Liquid: Thin liquids   Meds: Whole with liquids   Supervision: Patient eats in dining room  Reflux precautions     Eval only, No f/u tx indicated  Consider barium swallow to assess esophageal function   If choking or coughing occurs, consider video swallow study to assess for aspiration       PMH:  See above, includes pneumonia, psych/behavioral diagnoses, GERD    Previous VBS:  N/A    Current Diet: Pureed and thin liquids, was changed last night     Premorbid diet:  Regular and thin liquids    Dentition:  Edentulous on top, some missing teeth on bottom  O2 requirement:  Room air    Oral mech:  Strength and ROM: appears grossly intact    Vocal Quality/Speech:  WNL    Cognitive status:  Able to follow commands and hold a conversation without difficulty  Consistencies administered: Barium laden applesauce, soft solid, hard solid, thin liquids, 13mm barium pill  Liquids were administered by cup and straw  Pt stood laterally at 90 degrees  Oral stage:  Mild impairment  Lip closure: Good  Mastication: Mildly prolonged, pt tends to masticate with anterior teeth and with up and down motion rather than rotary chew  Bolus formation: Cohesive  Bolus control: Mildly reduced  Transfer: Prompt  Residue: Minimal to none  *despite pt report that she had no difficulty taking pills, pt was unable to clear the barium tablet from the oral cavity with multiple sips of thin water  With a tsp of applesauce, the tablet quickly cleared the oral cavity  Pharyngeal stage: WNL  Swallow promptness: Prompt  Spill to valleculae: Mild, intermittent with thin liqudis  Spill to pyriforms: Mild, intermittent with thin liquids  Epiglottic inversion: Full inversion  Laryngeal excursion: WNL  Pharyngeal constriction: WNL  Vallecular retention: No  Pyriform retention: No  PPW coating: No  Osteophytes: No  CP prominence: No  Retropulsion from prominence:N/A  Transient penetration: No  Epiglottic undercoat: No  Penetration: No  Aspiration:  No  Strategies: Liquid wash assisted in esophageal clearance of food bolus and pill  Response to aspiration: N/A    Screening of Esophageal stage: WNL  Dysmotility: Yes  Retropulsion: No  Tertiary contractions: No  Reflux: No  Retention: Yes, liquid wash assisted in moving bolus through to stomach  Stricture: ?   LES: ?dysfunction; observed thin barium slowly "dripping" into the stomach, pill appeared to sit on top of LES

## 2019-10-08 NOTE — PROGRESS NOTES
Pt is laying in bed with eyes closed, breath even and unlabored   Q 15 minutes checks during round continues  Remains on 1L of O2 via NC   Maintained SAFE precaution throughout shift  No PRN needed for insomnia   No behavior noted   Will continue to monitor behavior and sleep pattern

## 2019-10-08 NOTE — PLAN OF CARE
Problem: RESPIRATORY - ADULT  Goal: Achieves optimal ventilation and oxygenation  Description  INTERVENTIONS:  - Assess for changes in respiratory status  - Assess for changes in mentation and behavior  - Position to facilitate oxygenation and minimize respiratory effort  - Oxygen administration by appropriate delivery method based on oxygen saturation (per order) or ABGs  - Initiate smoking cessation education as indicated  - Encourage broncho-pulmonary hygiene including cough, deep breathe, Incentive Spirometry  - Assess the need for suctioning and aspirate as needed  - Assess and instruct to report SOB or any respiratory difficulty  - Respiratory Therapy support as indicated  Outcome: Progressing     Problem: SLEEP DISTURBANCE  Goal: Will exhibit normal sleeping pattern  Description  Interventions:  -  Assess the patients sleep pattern, noting recent changes  - Administer medication as ordered  - Decrease environmental stimuli, including noise, as appropriate during the night  - Encourage the patient to actively participate in unit groups and or exercise during the day to enhance ability to achieve adequate sleep at night  - Assess the patient, in the morning, encouraging a description of sleep experience  Outcome: Progressing       Patient is maintained on 1 liter oxygen via nasal canula while asleep  Patient was in bed, appeared sleeping  Eyes closed, chest rises and falls, breathing unlabored  No visible signs of pain, discomfort or respiratory distress  At [address] [place] [address] patient presented to nurses station requesting blood pressure check and accu check stating that she hadn't eaten and both were low  Patient did not present with signs or symptoms of distress  Patient was reminded that she had eaten 75% of her dinner and had snack and that she had been sleeping undisturbed all night  Patient was educated on what she needs to do to maintain adequate blood pressure and blood sugar    Patient acknowledged understanding and returned to bed  At [phone] patient returned to nurses station with same request   Patient was without distress, was reeducated, complied with 0600 meds and returned to bed

## 2019-10-08 NOTE — PLAN OF CARE
Problem: Alteration in Thoughts and Perception  Goal: Treatment Goal: Gain control of psychotic behaviors/thinking, reduce/eliminate presenting symptoms and demonstrate improved reality functioning upon discharge  Outcome: Progressing  Goal: Verbalize thoughts and feelings  Description  Interventions:  - Promote a nonjudgmental and trusting relationship with the patient through active listening and therapeutic communication  - Assess patient's level of functioning, behavior and potential for risk  - Engage patient in 1 on 1 interactions for a minimum of 15 minutes each session  - Encourage patient to express fears, feelings, frustrations, and discuss symptoms    - [place] patient to reality, help patient recognize reality-based thinking   - Administer medications as ordered and assess for potential side effects  - Provide the patient education related to the signs and symptoms of the illness and desired effects of prescribed medications  Outcome: Progressing  Goal: Agree to be compliant with medication regime, as prescribed and report medication side effects  Description  Interventions:  - Offer appropriate PRN medication and supervise ingestion; conduct aims, as needed   Outcome: Progressing  Goal: Attend and participate in unit activities, including therapeutic, recreational, and educational groups  Description  Interventions:  - Provide therapeutic and educational activities daily, encourage attendance and participation, and document same in the medical record     CERTIFIED PEER SPECIALIST INTERVENTIONS:    Complete peer assessment with patient to assess their needs and identify their goals to complete while in the recovery program as well as once discharged into the community  Patient will complete WRAP Plan, Crisis Plan and [facility]  Patient will attend 50% of groups offered on the unit  Patient will complete a goal card weekly      Outcome: Progressing  Goal: Recognize dysfunctional thoughts, communicate reality-based thoughts at the time of discharge  Description  Interventions:  - Provide medication and psycho-education to assist patient in compliance and developing insight into his/her illness   Outcome: Progressing  Goal: Complete daily ADLs, including personal hygiene independently, as able  Description  Interventions:  - Observe, teach, and assist patient with ADLS  - Monitor and promote a balance of rest/activity, with adequate nutrition and elimination   Outcome: Progressing     Problem: Ineffective Coping  Goal: Identifies ineffective coping skills  Outcome: Progressing  Goal: Identifies healthy coping skills  Outcome: Progressing  Goal: Demonstrates healthy coping skills  Outcome: Progressing  Goal: Participates in unit activities  Description  Interventions:  - Provide therapeutic environment   - Provide required programming   - Redirect inappropriate behaviors   Outcome: Progressing  Goal: Patient/Family participate in treatment and DC plans  Description  Interventions:  - Provide therapeutic environment  Outcome: Progressing  Goal: Patient/Family verbalizes awareness of resources  Outcome: Progressing  Goal: Understands least restrictive measures  Description  Interventions:  - Utilize least restrictive behavior  Outcome: Progressing     Problem: Risk for Self Injury/Neglect  Goal: Treatment Goal: Remain safe during length of stay, learn and adopt new coping skills, and be free of self-injurious ideation, impulses and acts at the time of discharge  Outcome: Progressing  Goal: Verbalize thoughts and feelings  Description  Interventions:  - Assess and re-assess patient's lethality and potential for self-injury  - Engage patient in 1:1 interactions, daily, for a minimum of 15 minutes  - Encourage patient to express feelings, fears, frustrations, hopes  - Establish rapport/trust with patient   Outcome: Progressing  Goal: Refrain from harming self  Description  Interventions:  - Monitor patient closely, per order  - Develop a trusting relationship  - Supervise medication ingestion, monitor effects and side effects   Outcome: Progressing  Goal: Attend and participate in unit activities, including therapeutic, recreational, and educational groups  Description  Interventions:  - Provide therapeutic and educational activities daily, encourage attendance and participation, and document same in the medical record  - Obtain collateral information, encourage visitation and family involvement in care   Outcome: Progressing  Goal: Recognize maladaptive responses and adopt new coping mechanisms  Outcome: Progressing  Goal: Complete daily ADLs, including personal hygiene independently, as able  Description  Interventions:  - Observe, teach, and assist patient with ADLS  - Monitor and promote a balance of rest/activity, with adequate nutrition and elimination  Outcome: Progressing     Problem: Depression  Goal: Treatment Goal: Demonstrate behavioral control of depressive symptoms, verbalize feelings of improved mood/affect, and adopt new coping skills prior to discharge  Outcome: Progressing  Goal: Verbalize thoughts and feelings  Description  Interventions:  - Assess and re-assess patient's level of risk   - Engage patient in 1:1 interactions, daily, for a minimum of 15 minutes   - Encourage patient to express feelings, fears, frustrations, hopes   Outcome: Progressing  Goal: Refrain from isolation  Description  Interventions:  - Develop a trusting relationship   - Encourage socialization   Outcome: Progressing  Goal: Refrain from self-neglect  Outcome: Progressing     Problem: Anxiety  Goal: Anxiety is at manageable level  Description  Interventions:  - Assess and monitor patient's anxiety level  - Monitor for signs and symptoms of anxiety both physical and emotional (heart palpitations, chest pain, shortness of breath, headaches, nausea, feeling jumpy, restlessness, irritable, apprehensive)     - Collaborate with interdisciplinary team and initiate plan and interventions as ordered  - [place] patient to unit/surroundings  - Explain treatment plan  - Encourage participation in care  - Encourage verbalization of concerns/fears  - Identify coping mechanisms  - Assist in developing anxiety-reducing skills  - Administer/offer alternative therapies  - Limit or eliminate stimulants  Outcome: Progressing     Problem: Alteration in Orientation  Goal: Interact with staff daily  Description  Interventions:  - Assess and re-assess patient's level of orientation  - Engage patient in 1 on 1 interactions, daily, for a minimum of 15 minutes   - Establish rapport/trust with patient   Outcome: Progressing  Goal: Cooperate with recommended testing/procedures  Description  Interventions:  - Determine need for ancillary testing  - Observe for mental status changes  - Implement falls/precaution protocol   Outcome: Progressing     Problem: PAIN - ADULT  Goal: Verbalizes/displays adequate comfort level or baseline comfort level  Description  Interventions:  - Encourage patient to monitor pain and request assistance  - Assess pain using appropriate pain scale  - Administer analgesics based on type and severity of pain and evaluate response  - Implement non-pharmacological measures as appropriate and evaluate response  - Consider cultural and social influences on pain and pain management  - Notify physician/advanced practitioner if interventions unsuccessful or patient reports new pain  Outcome: Progressing     Problem: SAFETY ADULT  Goal: Patient will remain free of falls  Description  INTERVENTIONS:  - Assess patient frequently for physical needs  -  Identify cognitive and physical deficits and behaviors that affect risk of falls    -  [place] fall precautions as indicated by assessment   - Educate patient/family on patient safety including physical limitations  - Instruct patient to call for assistance with activity based on assessment  - Modify environment to reduce risk of injury  - Consider OT/PT consult to assist with strengthening/mobility  Outcome: Progressing     Problem: RESPIRATORY - ADULT  Goal: Achieves optimal ventilation and oxygenation  Description  INTERVENTIONS:  - Assess for changes in respiratory status  - Assess for changes in mentation and behavior  - Position to facilitate oxygenation and minimize respiratory effort  - Oxygen administration by appropriate delivery method based on oxygen saturation (per order) or ABGs  - Initiate smoking cessation education as indicated  - Encourage broncho-pulmonary hygiene including cough, deep breathe, Incentive Spirometry  - Assess the need for suctioning and aspirate as needed  - Assess and instruct to report SOB or any respiratory difficulty  - Respiratory Therapy support as indicated  10/8/2019 1437 by Jacobo Bacon, RN  Outcome: Progressing  10/8/2019 0430 by Jacobo Bacon, RN  Outcome: Progressing     Problem: DISCHARGE PLANNING  Goal: Discharge to home or other facility with appropriate resources  Description  INTERVENTIONS:  - Conduct assessment to determine patient/family and health care team treatment goals, and need for post-acute services based on payer coverage, community resources, and patient preferences, and barriers to discharge  - Address psychosocial, clinical, and financial barriers to discharge as identified in assessment in conjunction with the patient/family and health care team  - Assist the patient in reintegration back into the community by removing barriers which may hinder a successful discharge once deemed stable  - Arrange appropriate level of post-acute services according to patient's needs and preference and payer coverage in collaboration with the physician and health care team  - Communicate with and update the patient/family, physician, and health care team regarding progress on the discharge plan  - Arrange appropriate transportation to post-acute venues    Outcome: Progressing     Problem: SLEEP DISTURBANCE  Goal: Will exhibit normal sleeping pattern  Description  Interventions:  -  Assess the patients sleep pattern, noting recent changes  - Administer medication as ordered  - Decrease environmental stimuli, including noise, as appropriate during the night  - Encourage the patient to actively participate in unit groups and or exercise during the day to enhance ability to achieve adequate sleep at night  - Assess the patient, in the morning, encouraging a description of sleep experience  10/8/2019 1437 by Leoncio Christopher, RN  Outcome: Progressing  10/8/2019 0430 by Leoncio Christopher, RN  Outcome: Progressing    APPETITE:  75% of both meals = pureed diet  MED CHANGES INCLUDING DATE:  10/8/19 WHOLE in Applesauce  PRNs GIVEN:  LABS:  Patient is polite, selectively cooperative, multiple excuses, somatic, poor insight, isolative to room and self  Remains in bed napping after barium swallow study  Barium swallow study done and EGD indicated  EGD order pending  Pureed diet and thin liquids in effect  Ate 75% of both meals  Meds are to be given whole in applesauce  No c/o s/s pain, discomfort or distress  Attended to hygiene and showered this shift, with set up by staff

## 2019-10-08 NOTE — PROGRESS NOTES
Psychiatry Progress Note    Subjective: Interval History     Katty Bowles has been somatically preoccupied, very eager to discuss her health concerns and to reiterate her need for various referrals  She was placed on a pureed diet yesterday as a precaution, as she states that she is unable to swallow solid foods and, as a result, is malnourished and fearful of "choking to death " She states her general condition is weakened by not having sufficient nutritional intake  Thus, she is quite pleased with the pureed diet and states that she finds the food to be more flavorful  She is also preoccupied with being hypoglycemic, which she believes would interfere with her ability to safely shower and attend groups  She states, "If you can't authorize them to check my sugar, at least authorize for them to check my blood pressure " She states she is fearful of select staff members, suggesting their approach is "corporal " She denies any HI SI SIBs      Behavior over the last 24 hours:  unchanged  Sleep: normal  Appetite: normal  Medication side effects: No  ROS: no complaints    Current medications:    Current Facility-Administered Medications:     acetaminophen (TYLENOL) tablet 650 mg, 650 mg, Oral, Q6H PRN, Sindy Day MD    albuterol (PROVENTIL HFA,VENTOLIN HFA) inhaler 2 puff, 2 puff, Inhalation, Q4H PRN, Sindy Day MD, 2 puff at 07/25/19 1200    aluminum-magnesium hydroxide-simethicone (MYLANTA) 200-200-20 mg/5 mL oral suspension 15 mL, 15 mL, Oral, Q4H PRN, Sindy Day MD    ammonium lactate (LAC-HYDRIN) 12 % lotion 1 application, 1 application, Topical, BID PRN, Sindy Day MD    benzonatate (TESSALON PERLES) capsule 100 mg, 100 mg, Oral, TID PRN, Sindy Day MD    benztropine (COGENTIN) injection 1 mg, 1 mg, Intramuscular, Q8H PRN, Sindy Day MD    cloZAPine (CLOZARIL) tablet 50 mg, 50 mg, Oral, BID, Sindy Day MD, 50 mg at 10/08/19 1707    EPINEPHrine PF (ADRENALIN) 1 mg/mL injection 0 15 mg, 0 15 mg, Intramuscular, Once PRN, Shi Pham MD    FLUoxetine (PROzac) capsule 20 mg, 20 mg, Oral, Daily, Shi Pham MD, 20 mg at 10/08/19 0840    fluticasone-vilanterol (BREO ELLIPTA) 200-25 MCG/INH inhaler 1 puff, 1 puff, Inhalation, Daily, Shi Pham MD, 1 puff at 10/08/19 0840    ketotifen (ZADITOR) 0 025 % ophthalmic solution 1 drop, 1 drop, Right Eye, BID PRN, Shi Pham MD    levothyroxine tablet 125 mcg, 125 mcg, Oral, Early Morning, Shi Pham MD, 125 mcg at 10/08/19 0535    magnesium hydroxide (MILK OF MAGNESIA) 400 mg/5 mL oral suspension 30 mL, 30 mL, Oral, Daily PRN, Shi Pham MD    montelukast (SINGULAIR) tablet 10 mg, 10 mg, Oral, HS, Shi Pham MD, 10 mg at 10/07/19 2148    OLANZapine (ZyPREXA) IM injection 5 mg, 5 mg, Intramuscular, Q8H PRN, Shi Pham MD    OLANZapine (ZyPREXA) tablet 5 mg, 5 mg, Oral, Q8H PRN, Shi Pham MD    pantoprazole (PROTONIX) EC tablet 40 mg, 40 mg, Oral, Early Morning, Shi Pham MD, 40 mg at 10/08/19 0535    polyethylene glycol (MIRALAX) packet 17 g, 17 g, Oral, Daily PRN, Shi Pham MD    polyvinyl alcohol (LIQUIFILM TEARS) 1 4 % ophthalmic solution 1 drop, 1 drop, Both Eyes, Q3H PRN, Shi Pham MD    theophylline (JEF-24) 24 hr capsule 200 mg, 200 mg, Oral, Daily, Shi Pham MD, 200 mg at 10/08/19 0840    tiotropium (SPIRIVA) capsule for inhaler 18 mcg, 18 mcg, Inhalation, Daily, Shi Pham MD, 18 mcg at 10/08/19 0840    traZODone (DESYREL) tablet 25 mg, 25 mg, Oral, HS PRN, Shi Pham MD    Current Problem List:    Patient Active Problem List   Diagnosis    Sepsis ([facility]    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[facility]    Tobacco use disorder, continuous    Bipolar disorder ([facility]    Left hip pain    Lactic acidosis    Hypokalemia    Hypomagnesemia    Compression fracture of L4 lumbar vertebra    Thoracic compression fracture ([facility]    Ventral hernia    Parapneumonic effusion    Acute on chronic respiratory failure with hypoxia ([facility]    Chronic respiratory failure (HCC)    Hypophosphatemia    Elevated MCV    Schizoaffective disorder, bipolar type (HCC)    Acquired hypothyroidism    Gastroesophageal reflux disease without esophagitis    Abnormal CT of the chest    Excessive cerumen in left ear canal    Lipoma of right upper extremity    Polydipsia    Localized swelling of both lower legs    Noncompliant with deep vein thrombosis (DVT) prophylaxis    Allergic conjunctivitis of right eye       Problem list reviewed 10/08/19     Objective:     Vital Signs:  Vitals:    10/07/19 1600 10/08/19 0730 10/08/19 0732 10/08/19 1600   BP: (!) 82/58 104/61 108/70 102/61   BP Location: Left arm Left arm Left arm Left arm   Pulse: 95 95 94 84   Resp: 16 18  17   Temp: 97 6 °F (36 4 °C) 98 1 °F (36 7 °C)  97 5 °F (36 4 °C)   TempSrc: Temporal Temporal  Temporal   SpO2: 92%   90%   Weight:       Height:             Appearance:  age appropriate and casually dressed   Behavior:  deviant and evasive   Speech:  pressured   Mood:  anxious   Affect:  mood-congruent   Thought Process:  circumstantial   Thought Content:  delusions  grandiose and somatic   Perceptual Disturbances: None   Risk Potential: none   Sensorium:  person, place, situation and time   Cognition:  intact   Consciousness:  alert and awake    Attention: attention span and concentration were age appropriate   Intellect: average   Insight:  poor   Judgment: poor      Motor Activity: no abnormal movements       I/O Past 24 hours:  No intake/output data recorded  No intake/output data recorded  Labs:  Reviewed 10/08/19      Assessment / Plan:     Schizoaffective disorder, bipolar type ([address] [facility] )    Recommended Treatment:      Medication changes:  1) continue current medication regimen  Non-pharmacological treatments  1) Continue with group therapy, milieu therapy and occupational therapy  Safety  1) Safety/communication plan established targeting dynamic risk factors above      2) Risks, benefits, and possible side effects of medications explained to patient and patient verbalizes understanding  Counseling / Coordination of Care    Total floor / unit time spent today 20 minutes  Greater than 50% of total time was spent with the patient and / or family counseling and / or coordination of care  A description of the counseling / coordination of care  Patient's Rights, confidentiality and exceptions to confidentiality, use of automated medical record, [facility] staff access to medical record, and consent to treatment reviewed      ABILIO Glaser

## 2019-10-08 NOTE — PROGRESS NOTES
10/08/19 0800   Team Meeting   Meeting Type Daily Rounds   Team Members Present   Team Members Present Nurse; Other (Discipline and Name)   Nursing Team Member Joseph Rosenthal RN   Other (Discipline and Name) DIANA Zacarias     Patient has not been attending groups  Is preoccupied with her swallowing and is now on a pureed diet until barium swallow test completed  She has been manipulative and somatic

## 2019-10-08 NOTE — PROGRESS NOTES
Patient went to bathroom in beginning of group and did not return        10/08/19 1100   Activity/Group Checklist   Group   ([facility] )   Attendance Did not attend   Attendance Duration (min) 46-60   Affect/Mood IRMA

## 2019-10-09 PROBLEM — Z91.89 AT RISK FOR ASPIRATION: Status: ACTIVE | Noted: 2019-10-09

## 2019-10-09 RX ORDER — ONDANSETRON 4 MG/1
4 TABLET, ORALLY DISINTEGRATING ORAL EVERY 6 HOURS PRN
Status: DISCONTINUED | OUTPATIENT
Start: 2019-10-09 | End: 2020-01-01 | Stop reason: HOSPADM

## 2019-10-09 RX ADMIN — FLUOXETINE 20 MG: 20 CAPSULE ORAL at 08:40

## 2019-10-09 RX ADMIN — CLOZAPINE 50 MG: 25 TABLET ORAL at 16:55

## 2019-10-09 RX ADMIN — TIOTROPIUM BROMIDE 18 MCG: 18 CAPSULE ORAL; RESPIRATORY (INHALATION) at 08:39

## 2019-10-09 RX ADMIN — LEVOTHYROXINE SODIUM 125 MCG: 125 TABLET ORAL at 05:56

## 2019-10-09 RX ADMIN — CLOZAPINE 50 MG: 25 TABLET ORAL at 21:02

## 2019-10-09 RX ADMIN — FLUTICASONE FUROATE AND VILANTEROL TRIFENATATE 1 PUFF: 200; 25 POWDER RESPIRATORY (INHALATION) at 08:40

## 2019-10-09 RX ADMIN — PANTOPRAZOLE SODIUM 40 MG: 40 TABLET, DELAYED RELEASE ORAL at 05:56

## 2019-10-09 RX ADMIN — THEOPHYLLINE ANHYDROUS 200 MG: 200 CAPSULE, EXTENDED RELEASE ORAL at 08:40

## 2019-10-09 RX ADMIN — MONTELUKAST SODIUM 10 MG: 10 TABLET, COATED ORAL at 21:03

## 2019-10-09 NOTE — PROGRESS NOTES
Pt not scheduled to attend      10/09/19 1400   Activity/Group Checklist   Group   ([facility] )   Attendance Did not attend   Attendance Duration (min) 46-60   Affect/Mood IRMA

## 2019-10-09 NOTE — PLAN OF CARE
Problem: PAIN - ADULT  Goal: Verbalizes/displays adequate comfort level or baseline comfort level  Description  Interventions:  - Encourage patient to monitor pain and request assistance  - Assess pain using appropriate pain scale  - Administer analgesics based on type and severity of pain and evaluate response  - Implement non-pharmacological measures as appropriate and evaluate response  - Consider cultural and social influences on pain and pain management  - Notify physician/advanced practitioner if interventions unsuccessful or patient reports new pain  Outcome: Progressing     Problem: SAFETY ADULT  Goal: Patient will remain free of falls  Description  INTERVENTIONS:  - Assess patient frequently for physical needs  -  Identify cognitive and physical deficits and behaviors that affect risk of falls    -  [place] fall precautions as indicated by assessment   - Educate patient/family on patient safety including physical limitations  - Instruct patient to call for assistance with activity based on assessment  - Modify environment to reduce risk of injury  - Consider OT/PT consult to assist with strengthening/mobility  Outcome: Progressing     Problem: RESPIRATORY - ADULT  Goal: Achieves optimal ventilation and oxygenation  Description  INTERVENTIONS:  - Assess for changes in respiratory status  - Assess for changes in mentation and behavior  - Position to facilitate oxygenation and minimize respiratory effort  - Oxygen administration by appropriate delivery method based on oxygen saturation (per order) or ABGs  - Initiate smoking cessation education as indicated  - Encourage broncho-pulmonary hygiene including cough, deep breathe, Incentive Spirometry  - Assess the need for suctioning and aspirate as needed  - Assess and instruct to report SOB or any respiratory difficulty  - Respiratory Therapy support as indicated  Outcome: Progressing     Problem: SLEEP DISTURBANCE  Goal: Will exhibit normal sleeping pattern  Description  Interventions:  -  Assess the patients sleep pattern, noting recent changes  - Administer medication as ordered  - Decrease environmental stimuli, including noise, as appropriate during the night  - Encourage the patient to actively participate in unit groups and or exercise during the day to enhance ability to achieve adequate sleep at night  - Assess the patient, in the morning, encouraging a description of sleep experience  Outcome: Progressing

## 2019-10-09 NOTE — PROGRESS NOTES
Psychiatry Progress Note    Subjective: Interval History     Sania Burn remains somatically preoccupied  She is eager for the GI consult and EGD  She continues to receive a pureed diet, which she feels is helpful  She continues to allege that she is "weak" due to malnutrition  She remains manipulative and attention seeking  She has been more visible in the milieu, asking about her health conditions  Wants me to call [facility]  Reports improvement in depression  Presents as anxious  She slept well  Has been tolerating the pureed diet  She denies HI SI  Doesn't feel she is well enough for groups  Consult entered for GI      Behavior over the last 24 hours:  unchanged  Sleep: normal  Appetite: normal  Medication side effects: No  ROS: no complaints    Current medications:    Current Facility-Administered Medications:     acetaminophen (TYLENOL) tablet 650 mg, 650 mg, Oral, Q6H PRN, Christian Bennett MD    albuterol (PROVENTIL HFA,VENTOLIN HFA) inhaler 2 puff, 2 puff, Inhalation, Q4H PRN, Christian Bennett MD, 2 puff at 07/25/19 1200    aluminum-magnesium hydroxide-simethicone (MYLANTA) 200-200-20 mg/5 mL oral suspension 15 mL, 15 mL, Oral, Q4H PRN, Christian Bennett MD    ammonium lactate (LAC-HYDRIN) 12 % lotion 1 application, 1 application, Topical, BID PRN, Christian Bennett MD    benzonatate (TESSALON PERLES) capsule 100 mg, 100 mg, Oral, TID PRN, Christian Bennett MD    benztropine (COGENTIN) injection 1 mg, 1 mg, Intramuscular, Q8H PRN, Christian Bennett MD    cloZAPine (CLOZARIL) tablet 50 mg, 50 mg, Oral, BID, Christian Bennett MD, 50 mg at 10/08/19 2100    EPINEPHrine PF (ADRENALIN) 1 mg/mL injection 0 15 mg, 0 15 mg, Intramuscular, Once PRN, Christian Bennett MD    FLUoxetine (PROzac) capsule 20 mg, 20 mg, Oral, Daily, Christian Bennett MD, 20 mg at 10/09/19 0840    fluticasone-vilanterol (BREO ELLIPTA) 200-25 MCG/INH inhaler 1 puff, 1 puff, Inhalation, Daily, Christian Bennett MD, 1 puff at 10/09/19 0840    ketotifen (ZADITOR) 0 025 % ophthalmic solution 1 drop, 1 drop, Right Eye, BID PRN, Deep Durand MD    levothyroxine tablet 125 mcg, 125 mcg, Oral, Early Morning, Deep Durand MD, 125 mcg at 10/09/19 0556    magnesium hydroxide (MILK OF MAGNESIA) 400 mg/5 mL oral suspension 30 mL, 30 mL, Oral, Daily PRN, Deep Durand MD    montelukast (SINGULAIR) tablet 10 mg, 10 mg, Oral, HS, Deep Durand MD, 10 mg at 10/08/19 2120    OLANZapine (ZyPREXA) IM injection 5 mg, 5 mg, Intramuscular, Q8H PRN, Deep Durand MD    OLANZapine (ZyPREXA) tablet 5 mg, 5 mg, Oral, Q8H PRN, Deep Durand MD    ondansetron (ZOFRAN-ODT) dispersible tablet 4 mg, 4 mg, Oral, Q6H PRN, ABILIO Morales    pantoprazole (PROTONIX) EC tablet 40 mg, 40 mg, Oral, Early Morning, eDep Durand MD, 40 mg at 10/09/19 0556    polyethylene glycol (MIRALAX) packet 17 g, 17 g, Oral, Daily PRN, Deep Durand MD    polyvinyl alcohol (LIQUIFILM TEARS) 1 4 % ophthalmic solution 1 drop, 1 drop, Both Eyes, Q3H PRN, Deep Durand MD    theophylline (JEF-24) 24 hr capsule 200 mg, 200 mg, Oral, Daily, Deep Durand MD, 200 mg at 10/09/19 0840    tiotropium (SPIRIVA) capsule for inhaler 18 mcg, 18 mcg, Inhalation, Daily, Deep Durand MD, 18 mcg at 10/09/19 0839    traZODone (DESYREL) tablet 25 mg, 25 mg, Oral, HS PRN, Deep Durand MD    Current Problem List:    Patient Active Problem List   Diagnosis    [facility]    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[facility]    Tobacco use disorder, continuous    Bipolar disorder ([facility]    Left hip pain    Lactic acidosis    Hypokalemia    Hypomagnesemia    Compression fracture of L4 lumbar vertebra    Thoracic compression fracture ([facility]    Ventral hernia    Parapneumonic effusion    Acute on chronic respiratory failure with hypoxia (HCC)    Chronic respiratory failure (HCC)    Hypophosphatemia    Elevated MCV    Schizoaffective disorder, bipolar type ([facility]    Acquired hypothyroidism    Gastroesophageal reflux disease without esophagitis    Abnormal CT of the chest  Excessive cerumen in left ear canal    Lipoma of right upper extremity    Polydipsia    Localized swelling of both lower legs    Noncompliant with deep vein thrombosis (DVT) prophylaxis    Allergic conjunctivitis of right eye    At risk for aspiration       Problem list reviewed 10/09/19     Objective:     Vital Signs:  Vitals:    10/08/19 1600 10/08/19 2000 10/08/19 2150 10/09/19 0700   BP: 102/61 108/64  120/74   BP Location: Left arm Left arm  Right arm   Pulse: 84 93 80 101   Resp: 17 17  20   Temp: 97 5 °F (36 4 °C) (!) 97 2 °F (36 2 °C)  (!) 97 4 °F (36 3 °C)   TempSrc: Temporal Temporal     SpO2: 90% 91% 97%    Weight:       Height:             Appearance:  age appropriate and casually dressed   Behavior:  deviant and evasive   Speech:  pressured   Mood:  anxious   Affect:  mood-congruent   Thought Process:  circumstantial   Thought Content:  delusions  grandiose and somatic   Perceptual Disturbances: None   Risk Potential: none   Sensorium:  person, place, situation and time   Cognition:  intact   Consciousness:  alert and awake    Attention: attention span and concentration were age appropriate   Intellect: average   Insight:  poor   Judgment: poor      Motor Activity: no abnormal movements       I/O Past 24 hours:  No intake/output data recorded  No intake/output data recorded  Labs:  Reviewed 10/09/19      Assessment / Plan:     Schizoaffective disorder, bipolar type ([facility] )    Recommended Treatment:      Medication changes:  1) continue current medication regimen  Non-pharmacological treatments  1) Continue with group therapy, milieu therapy and occupational therapy  Safety  1) Safety/communication plan established targeting dynamic risk factors above  2) Risks, benefits, and possible side effects of medications explained to patient and patient verbalizes understanding  Counseling / Coordination of Care    Total floor / unit time spent today 20 minutes   Greater than 50% of total time was spent with the patient and / or family counseling and / or coordination of care  A description of the counseling / coordination of care  Patient's Rights, confidentiality and exceptions to confidentiality, use of automated medical record, [facility] staff access to medical record, and consent to treatment reviewed      ABILIO Blankenship

## 2019-10-09 NOTE — PROGRESS NOTES
10/09/19 0900   Team Meeting   Meeting Type Daily Rounds   Team Members Present   Team Members Present Nurse; Other (Discipline and Name)   Nursing Team Member Surinder Brewster RN   Other (Discipline and Name) Lv [facility]; SHANIKA Aguilar     Patient had barium swallow evaluation, EGD indicated  Behaviorally, she continues to manipulate staff in order to get things she wants  Slept

## 2019-10-09 NOTE — PLAN OF CARE
Problem: Alteration in Thoughts and Perception  Goal: Verbalize thoughts and feelings  Description  Interventions:  - Promote a nonjudgmental and trusting relationship with the patient through active listening and therapeutic communication  - Assess patient's level of functioning, behavior and potential for risk  - Engage patient in 1 on 1 interactions for a minimum of 15 minutes each session  - Encourage patient to express fears, feelings, frustrations, and discuss symptoms    - [place] patient to reality, help patient recognize reality-based thinking   - Administer medications as ordered and assess for potential side effects  - Provide the patient education related to the signs and symptoms of the illness and desired effects of prescribed medications  Outcome: Progressing  Goal: Agree to be compliant with medication regime, as prescribed and report medication side effects  Description  Interventions:  - Offer appropriate PRN medication and supervise ingestion; conduct aims, as needed   Outcome: Progressing     Problem: Alteration in Thoughts and Perception  Goal: Recognize dysfunctional thoughts, communicate reality-based thoughts at the time of discharge  Description  Interventions:  - Provide medication and psycho-education to assist patient in compliance and developing insight into his/her illness   Outcome: Not Progressing   Patient is pleasant and cooperative this shift  She is mostly isolative to her room  She remains on a pureed foods, all liquid diet but she continues to be worried about swallowing  She c/o feeling nauseous this morning, zofran 4mg q 6 hours PRN ordered  However, she ate breakfast and then went right to her room to sit on her bed for 45 mins  She is noted to be laying in bed napping most of the day  No group attendance noted  Continue to monitor

## 2019-10-09 NOTE — PROGRESS NOTES
No change from previous assessment  She ate 75% of her dinner  Remains somatic and preoccupied with her "health issues"  No issues or concerns noted  Continue to monitor

## 2019-10-09 NOTE — PROGRESS NOTES
Individual appears to have slept comfortably through out the night, no distress noted, no issues or concerns verbalized  Will continue to monitor

## 2019-10-09 NOTE — PROGRESS NOTES
Patient attended group however, did not participate in activity        10/08/19 1500   Activity/Group Checklist   Group   (Recovery Workshop )   Attendance Attended   Attendance Duration (min) 46-60   Interactions Interacted appropriately   Affect/Mood Appropriate;Normal range   Goals Achieved Able to listen to others

## 2019-10-09 NOTE — PROGRESS NOTES
Patient declined      10/09/19 1100   Activity/Group Checklist   Group   (IMR/Wellness Wheel )   Attendance Did not attend   Attendance Duration (min) 46-60   Affect/Mood IRMA

## 2019-10-10 RX ORDER — CLOZAPINE 25 MG/1
25 TABLET ORAL
Status: DISCONTINUED | OUTPATIENT
Start: 2019-10-10 | End: 2019-10-28

## 2019-10-10 RX ADMIN — CLOZAPINE 50 MG: 25 TABLET ORAL at 16:54

## 2019-10-10 RX ADMIN — PANTOPRAZOLE SODIUM 40 MG: 40 TABLET, DELAYED RELEASE ORAL at 05:40

## 2019-10-10 RX ADMIN — LEVOTHYROXINE SODIUM 125 MCG: 125 TABLET ORAL at 05:40

## 2019-10-10 RX ADMIN — THEOPHYLLINE ANHYDROUS 200 MG: 200 CAPSULE, EXTENDED RELEASE ORAL at 08:17

## 2019-10-10 RX ADMIN — FLUOXETINE 20 MG: 20 CAPSULE ORAL at 08:16

## 2019-10-10 RX ADMIN — TIOTROPIUM BROMIDE 18 MCG: 18 CAPSULE ORAL; RESPIRATORY (INHALATION) at 08:16

## 2019-10-10 RX ADMIN — CLOZAPINE 25 MG: 25 TABLET ORAL at 21:24

## 2019-10-10 RX ADMIN — MONTELUKAST SODIUM 10 MG: 10 TABLET, COATED ORAL at 21:23

## 2019-10-10 RX ADMIN — FLUTICASONE FUROATE AND VILANTEROL TRIFENATATE 1 PUFF: 200; 25 POWDER RESPIRATORY (INHALATION) at 08:16

## 2019-10-10 RX ADMIN — CLOZAPINE 50 MG: 25 TABLET ORAL at 21:24

## 2019-10-10 NOTE — PROGRESS NOTES
Pt declined      10/10/19 1100   Activity/Group Checklist   Group   (IMR/Community Meeting )   Attendance Did not attend   Attendance Duration (min) 46-60   Affect/Mood IRMA

## 2019-10-10 NOTE — PLAN OF CARE
Problem: PAIN - ADULT  Goal: Verbalizes/displays adequate comfort level or baseline comfort level  Description  Interventions:  - Encourage patient to monitor pain and request assistance  - Assess pain using appropriate pain scale  - Administer analgesics based on type and severity of pain and evaluate response  - Implement non-pharmacological measures as appropriate and evaluate response  - Consider cultural and social influences on pain and pain management  - Notify physician/advanced practitioner if interventions unsuccessful or patient reports new pain  10/10/2019 0105 by Sunshine Segura LPN  Outcome: Progressing  10/10/2019 0046 by Sunshine Segura LPN  Outcome: Progressing     Problem: SAFETY ADULT  Goal: Patient will remain free of falls  Description  INTERVENTIONS:  - Assess patient frequently for physical needs  -  Identify cognitive and physical deficits and behaviors that affect risk of falls    -  Tiger fall precautions as indicated by assessment   - Educate patient/family on patient safety including physical limitations  - Instruct patient to call for assistance with activity based on assessment  - Modify environment to reduce risk of injury  - Consider OT/PT consult to assist with strengthening/mobility  10/10/2019 0105 by Sunshine Segura LPN  Outcome: Progressing  10/10/2019 0046 by Sunshine Segura LPN  Outcome: Progressing     Problem: RESPIRATORY - ADULT  Goal: Achieves optimal ventilation and oxygenation  Description  INTERVENTIONS:  - Assess for changes in respiratory status  - Assess for changes in mentation and behavior  - Position to facilitate oxygenation and minimize respiratory effort  - Oxygen administration by appropriate delivery method based on oxygen saturation (per order) or ABGs  - Initiate smoking cessation education as indicated  - Encourage broncho-pulmonary hygiene including cough, deep breathe, Incentive Spirometry  - Assess the need for suctioning and aspirate as needed  - Assess and instruct to report SOB or any respiratory difficulty  - Respiratory Therapy support as indicated  10/10/2019 0105 by Axel Feldman LPN  Outcome: Progressing  10/10/2019 0046 by Axel Feldman LPN  Outcome: Cheri Simons is laying in bed with her eyes closed, breath even and unlabored  O2 at 1L with humidifier via NC at HS maintained throughout the night  No respiratory distress noted  Q 15 minutes checks during rounds continues  Maintained on ongoing fall and SAFE precaution without incident on this shift  No indication of pain or discomfort noted  No behavior noted    Will continue to monitor behavior and sleep pattern

## 2019-10-10 NOTE — PROGRESS NOTES
10/10/19 0900   Team Meeting   Meeting Type Daily Rounds   Team Members Present   Team Members Present Nurse; Other (Discipline and Name)   Nursing Team Member Dmitriy Blas RN   Other (Discipline and Name) Kelby Herrera [place]; SHANIKA Chaney     GI consult  Did not go to any groups  Somatic  Slept

## 2019-10-10 NOTE — PROGRESS NOTES
Psychiatry Progress Note    Subjective: Interval History     Roosevelt Lozano has been isolating to her room, reporting she is too ill to participate in groups and unit activities  She is displeased because her medications are now being administered in pudding  She is tolerating the pureed diet and awaits a GI consult  She alleges malnutrition, although she is eating  She has multiple reasons why she cannot attend to her ADLs or groups  Not focused on recovery  Accusatory toward staff, suggesting they are too firm with her and are not taking her "very serious" medical problems into consideration  She would like to have [facility] come to the unit to "let the nurses have it " She has been medication and meal adherent  Will increase her Clozaril due to ongoing somatic preoccupations, which are impeding her recovery/progress      Behavior over the last 24 hours:  unchanged  Sleep: normal  Appetite: normal  Medication side effects: No  ROS: no complaints    Current medications:    Current Facility-Administered Medications:     acetaminophen (TYLENOL) tablet 650 mg, 650 mg, Oral, Q6H PRN, Prakash Brewtser MD    albuterol (PROVENTIL HFA,VENTOLIN HFA) inhaler 2 puff, 2 puff, Inhalation, Q4H PRN, Prakash Brewster MD, 2 puff at 07/25/19 1200    aluminum-magnesium hydroxide-simethicone (MYLANTA) 200-200-20 mg/5 mL oral suspension 15 mL, 15 mL, Oral, Q4H PRN, Prakash Brewster MD    ammonium lactate (LAC-HYDRIN) 12 % lotion 1 application, 1 application, Topical, BID PRN, Prakash Brewster MD    benzonatate (TESSALON PERLES) capsule 100 mg, 100 mg, Oral, TID PRN, Prakash Brewster MD    benztropine (COGENTIN) injection 1 mg, 1 mg, Intramuscular, Q8H PRN, Prakash Brewster MD    cloZAPine (CLOZARIL) tablet 25 mg, 25 mg, Oral, HS, ABILIO Cook    cloZAPine (CLOZARIL) tablet 50 mg, 50 mg, Oral, BID, Prakash Brewster MD, 50 mg at 10/09/19 2102    EPINEPHrine PF (ADRENALIN) 1 mg/mL injection 0 15 mg, 0 15 mg, Intramuscular, Once PRN, Prakash Brewster MD  [facility] FLUoxetine (PROzac) capsule 20 mg, 20 mg, Oral, Daily, Krystyna Mcclain MD, 20 mg at 10/10/19 0816    fluticasone-vilanterol (BREO ELLIPTA) 200-25 MCG/INH inhaler 1 puff, 1 puff, Inhalation, Daily, Krystyna Mcclain MD, 1 puff at 10/10/19 0816    ketotifen (ZADITOR) 0 025 % ophthalmic solution 1 drop, 1 drop, Right Eye, BID PRN, Krystyna Mcclain MD    levothyroxine tablet 125 mcg, 125 mcg, Oral, Early Morning, Krystyna Mcclain MD, 125 mcg at 10/10/19 0540    magnesium hydroxide (MILK OF MAGNESIA) 400 mg/5 mL oral suspension 30 mL, 30 mL, Oral, Daily PRN, Krystyna Mcclain MD    montelukast (SINGULAIR) tablet 10 mg, 10 mg, Oral, HS, Krystyna Mcclain MD, 10 mg at 10/09/19 2103    OLANZapine (ZyPREXA) IM injection 5 mg, 5 mg, Intramuscular, Q8H PRN, Krystyna Mcclain MD    OLANZapine (ZyPREXA) tablet 5 mg, 5 mg, Oral, Q8H PRN, Krystyna Mcclain MD    ondansetron (ZOFRAN-ODT) dispersible tablet 4 mg, 4 mg, Oral, Q6H PRN, ABILIO Rodney    pantoprazole (PROTONIX) EC tablet 40 mg, 40 mg, Oral, Early Morning, Krystyna Mcclain MD, 40 mg at 10/10/19 0540    polyethylene glycol (MIRALAX) packet 17 g, 17 g, Oral, Daily PRN, Krystyna Mcclain MD    polyvinyl alcohol (LIQUIFILM TEARS) 1 4 % ophthalmic solution 1 drop, 1 drop, Both Eyes, Q3H PRN, Krystyna Mcclain MD    theophylline (JEF-24) 24 hr capsule 200 mg, 200 mg, Oral, Daily, Krystyna Mcclain MD, 200 mg at 10/10/19 0817    tiotropium (SPIRIVA) capsule for inhaler 18 mcg, 18 mcg, Inhalation, Daily, Krystyna Mcclain MD, 18 mcg at 10/10/19 0816    traZODone (DESYREL) tablet 25 mg, 25 mg, Oral, HS PRN, Krystyna Mcclain MD    Current Problem List:    Patient Active Problem List   Diagnosis    [facility]is ([facility] [address] [facility]    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    Tobacco use disorder, continuous    B[facility]    Left hip pain    Lactic acidosis    Hypokalemia    Hypomagnesemia    Compression fracture of L4 lumbar vertebra    Thoracic compression fracture (HCC)    Ventral hernia    Parapneumonic effusion    Acute on chronic respiratory failure with hypoxia (HCC)    Chronic respiratory failure (HCC)    Hypophosphatemia    Elevated MCV    Schizoaffective disorder, bipolar type (HCC)    Acquired hypothyroidism    Gastroesophageal reflux disease without esophagitis    Abnormal CT of the chest    Excessive cerumen in left ear canal    Lipoma of right upper extremity    Polydipsia    Localized swelling of both lower legs    Noncompliant with deep vein thrombosis (DVT) prophylaxis    Allergic conjunctivitis of right eye    At risk for aspiration       Problem list reviewed 10/10/19     Objective:     Vital Signs:  Vitals:    10/09/19 1601 10/09/19 1932 10/09/19 2208 10/10/19 0700   BP: 106/63 102/63  125/78   BP Location: Left arm Left arm  Left arm   Pulse: 86 89  94   Resp: 16 18 19   Temp: (!) 97 °F (36 1 °C) 97 6 °F (36 4 °C)  98 °F (36 7 °C)   TempSrc: Temporal Temporal  Temporal   SpO2: 92% 96% 94% 95%   Weight:       Height:             Appearance:  age appropriate and casually dressed   Behavior:  deviant and evasive   Speech:  pressured   Mood:  anxious   Affect:  mood-congruent   Thought Process:  circumstantial   Thought Content:  delusions  grandiose and somatic   Perceptual Disturbances: None   Risk Potential: none   Sensorium:  person, place, situation and time   Cognition:  intact   Consciousness:  alert and awake    Attention: attention span and concentration were age appropriate   Intellect: average   Insight:  poor   Judgment: poor      Motor Activity: no abnormal movements       I/O Past 24 hours:  No intake/output data recorded  No intake/output data recorded  Labs:  Reviewed 10/10/19      Assessment / Plan:     Schizoaffective disorder, bipolar type ([facility] )    Recommended Treatment:      Medication changes:  1) continue current medication regimen  Non-pharmacological treatments  1) Continue with group therapy, milieu therapy and occupational therapy      Safety  1) Safety/communication plan established targeting dynamic risk factors above  2) Risks, benefits, and possible side effects of medications explained to patient and patient verbalizes understanding  Counseling / Coordination of Care    Total floor / unit time spent today 20 minutes  Greater than 50% of total time was spent with the patient and / or family counseling and / or coordination of care  A description of the counseling / coordination of care  Patient's Rights, confidentiality and exceptions to confidentiality, use of automated medical record, [facility] staff access to medical record, and consent to treatment reviewed      ABILIO Alvares

## 2019-10-10 NOTE — CONSULTS
Pushpa Morgan Patient MRN: [id number]  Date of Service: 10/10/2019  Referring Physician: Jose KNOX  Provider Creating Note: ABILIO Grimes  PCP: Cady De Jesus  Reason for Consult:  Dysphagia  HPI  Pushpa Morgan is a 60 y o  female who was admitted with Schizoaffective disorder, bipolar type ([facility] [address] )  Pt reports she has been experiencing intermittent dysphagia over the past 6 months  Video barium on 10/8, noted large hiatal hernia, questionable LES dysfunction, intermittent retention in esophagus suspected dysmotility  No specific food causes her issues  She has been tolerating her diet  She is tolerating pureed foods, thin liquids, meds crushed in pudding  Per RN, pt has eating potato chips without issues  Per RN, pt refuses to eat at times secondary to psychological issues and anxiety  Denies acid reflux, heartburn, nausea, vomiting, abd pain, melena, or change in bowel habits  Reports sister has a history of Martines's esophagus and keeps up with EGD surveillance  Pt states she has never had an EGD in the past  Denies any significant NSAID use  I discussed EGD with pt, she states she would like to go forward with the evaluation but she is nervous and wants to think about it over the weekend  She would like to have there EGD done on Monday, as long as she has not changed her mind       Past Medical History:   Diagnosis Date    Acid reflux     Anxiety     Asthma     Bipolar 1 disorder (HCC)     Chronic pain disorder     Chronic respiratory failure (HCC)     Compression fracture of fourth lumbar vertebra (HCC)     COPD (chronic obstructive pulmonary disease) (HCC)     Depression     GERD (gastroesophageal reflux disease)     History of home oxygen therapy     Hypothyroidism     Lipoma of upper extremity     Psychiatric illness     Schizoaffective disorder (HCC)     Substance abuse (HCC)     Nicotine    Thoracic compression fracture (HCC)     Ventral hernia      No past surgical history on file  Medications  Home Medications:   Prior to Admission medications    Medication Sig Start Date End Date Taking? Authorizing Provider   acetaminophen (TYLENOL) 325 mg tablet Take 2 tablets (650 mg total) by mouth every 6 (six) hours as needed for mild pain 4/19/19   Oliva Nick PA-C   albuterol (PROVENTIL HFA,VENTOLIN HFA) 90 mcg/act inhaler Inhale 2 puffs every 4 (four) hours as needed for wheezing 4/19/19   Oliva Nick PA-C   cloZAPine (CLOZARIL) 25 mg tablet Take 3 tablets (75 mg total) by mouth daily at bedtime  Patient not taking: Reported on 8/30/2019 7/23/19   ABILIO Berg   EPINEPHrine (EPIPEN JR) 0 15 mg/0 3 mL SOAJ Inject 0 3 mL (0 15 mg total) into a muscle once as needed for anaphylaxis As needed for allergic reaction 3/21/19   Vlad Ballard MD   FLUoxetine (PROzac) 10 mg capsule Take 1 capsule (10 mg total) by mouth daily 7/24/19   ABILIO Berg   levothyroxine 125 mcg tablet Take 1 tablet (125 mcg total) by mouth daily in the early morning 7/24/19   ABILIO Berg   OXYGEN-HELIUM IN Inhale 3 L      Historical Provider, MD   pantoprazole (PROTONIX) 40 mg tablet Take 1 tablet (40 mg total) by mouth daily in the early morning 7/24/19   ABILIO Berg   theophylline (JEF-24) 200 MG 24 hr capsule Take 1 capsule (200 mg total) by mouth daily 4/19/19   Oliva Nick PA-C       Inhouse Medications    Current Facility-Administered Medications:     acetaminophen (TYLENOL) tablet 650 mg, 650 mg, Oral, Q6H PRN, Jaz Beasley MD    albuterol (PROVENTIL HFA,VENTOLIN HFA) inhaler 2 puff, 2 puff, Inhalation, Q4H PRN, Jaz Beasley MD, 2 puff at 07/25/19 1200    aluminum-magnesium hydroxide-simethicone (MYLANTA) 200-200-20 mg/5 mL oral suspension 15 mL, 15 mL, Oral, Q4H PRN, Jaz Beasley MD    ammonium lactate (LAC-HYDRIN) 12 % lotion 1 application, 1 application, Topical, BID PRN, Jaz Beasley MD    benzonatate (TESSALON PERLES) capsule 100 mg, 100 mg, Oral, TID PRN, Meldon Curling, MD    benztropine (COGENTIN) injection 1 mg, 1 mg, Intramuscular, Q8H PRN, Meldon Curling, MD    cloZAPine (CLOZARIL) tablet 25 mg, 25 mg, Oral, HS, Romina Granda, JOSE CARLOSNP    cloZAPine (CLOZARIL) tablet 50 mg, 50 mg, Oral, BID, Meldon Curling, MD, 50 mg at 10/09/19 2102    EPINEPHrine PF (ADRENALIN) 1 mg/mL injection 0 15 mg, 0 15 mg, Intramuscular, Once PRN, Meldon Curling, MD    FLUoxetine (PROzac) capsule 20 mg, 20 mg, Oral, Daily, Meldon Curling, MD, 20 mg at 10/10/19 0816    fluticasone-vilanterol (BREO ELLIPTA) 200-25 MCG/INH inhaler 1 puff, 1 puff, Inhalation, Daily, Meldon Curling, MD, 1 puff at 10/10/19 0816    ketotifen (ZADITOR) 0 025 % ophthalmic solution 1 drop, 1 drop, Right Eye, BID PRN, Meldon Curling, MD    levothyroxine tablet 125 mcg, 125 mcg, Oral, Early Morning, Meldon Curling, MD, 125 mcg at 10/10/19 0540    magnesium hydroxide (MILK OF MAGNESIA) 400 mg/5 mL oral suspension 30 mL, 30 mL, Oral, Daily PRN, Meldon Curling, MD    montelukast (SINGULAIR) tablet 10 mg, 10 mg, Oral, HS, Meldon Curling, MD, 10 mg at 10/09/19 2103    OLANZapine (ZyPREXA) IM injection 5 mg, 5 mg, Intramuscular, Q8H PRN, Meldon Curling, MD    OLANZapine (ZyPREXA) tablet 5 mg, 5 mg, Oral, Q8H PRN, Meldon Curling, MD    ondansetron (ZOFRAN-ODT) dispersible tablet 4 mg, 4 mg, Oral, Q6H PRN, ABILIO Farooq    pantoprazole (PROTONIX) EC tablet 40 mg, 40 mg, Oral, Early Morning, Meldon Curling, MD, 40 mg at 10/10/19 0540    polyethylene glycol (MIRALAX) packet 17 g, 17 g, Oral, Daily PRN, Meldon Curling, MD    polyvinyl alcohol (LIQUIFILM TEARS) 1 4 % ophthalmic solution 1 drop, 1 drop, Both Eyes, Q3H PRN, Meldon Curling, MD    theophylline (JEF-24) 24 hr capsule 200 mg, 200 mg, Oral, Daily, Meldon Curling, MD, 200 mg at 10/10/19 0817    tiotropium (SPIRIVA) capsule for inhaler 18 mcg, 18 mcg, Inhalation, Daily, Meldon Curling, MD, 18 mcg at 10/10/19 0816    traZODone (DESYREL) tablet 25 mg, 25 mg, Oral, HS PRN, [place] Lesia Garcia MD    Allergies  Allergies   Allergen Reactions    Peanut-Containing Drug Products Anaphylaxis    Shellfish-Derived Products Anaphylaxis    Antihistamines, Chlorpheniramine-Type     Aspirin     Atrovent [Ipratropium]     Elavil [Amitriptyline]     Iodine      Other reaction(s): Unknown Reaction    Levaquin [Levofloxacin]     Novocain [Procaine]     Penicillins      Able to tolerate azithromycin and vancomycin    Prolixin [Fluphenazine]     Sulfa Antibiotics Other (See Comments)     Unknown Zithromax-Tight Throat    Zithromax [Azithromycin] Throat Swelling     Tight throat       Social History   reports that she has been smoking cigarettes  She has been smoking about 0 20 packs per day  She has never used smokeless tobacco  She reports that she does not drink alcohol or use drugs  Family History  Family History   Problem Relation Age of Onset    Arthritis Mother    Sister  Martines's esophagus    ROS  ROS: Denies CP, SOB, fever, weight loss, adenopathy, rash  All others negative except as noted in HPI  Objective   Vitals  /78 (BP Location: Left arm)   Pulse 94   Temp 98 °F (36 7 °C) (Temporal)   Resp 19   Ht 5' 4" (1 626 m)   Wt 67 4 kg (148 lb 9 6 oz)   SpO2 95%   BMI 25 51 kg/m²   General: Alert, no apparent distress  Eyes: No scleral icterus, EOM's intact  ENT: MMM  Card: RRR no murmur  Lungs: Clear to ascultation b/l  No wheezes, rales, rhonchi  Abdomen: Soft  Nontender  Nondistended  Bowel sounds present and normoactive  No hepatosplenomegaly  Skin: No jaundice  Ext: No edema, clubbing, or cyanosis  Psych:  Normal affect    Neuro: Awake, alert and oriented x3      Laboratory Studies  Lab Results   Component Value Date    WBC 8 30 10/05/2019    HGB 14 3 10/05/2019    HCT 43 6 10/05/2019     10/05/2019    MCV 94 10/05/2019     Lab Results   Component Value Date    CREATININE 0 68 08/21/2019    BUN 17 08/21/2019    SODIUM 138 08/21/2019    K 4 3 08/21/2019     08/21/2019    CO2 28 08/21/2019    GLUCOSE 85 05/18/2015    CALCIUM 9 5 08/21/2019    PROT 5 6 (L) 05/11/2015    ALKPHOS 105 08/21/2019    BILITOT 0 3 05/11/2015    AST 19 08/21/2019    ALT 11 08/21/2019     Lab Results   Component Value Date    PROTIME 10 6 08/30/2019    INR 0 95 08/30/2019       Imaging and Other Studies  Video Barium swallow 10/8/19    Assessment and Plan:    1  Dysphagia, diet per Speech  We re-discuss EGD with pt on Monday, she wants to think about whether or not she wants to have the EGD or not over the weekend  2  Large hiatal hernia  3  GERD, stable  4  Schizoaffective bipolar disorder  5   COPD, stable        Principal Problem:    Schizoaffective disorder, bipolar type ([facility] [address] )  Active Problems:    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[facility] [address] )    Acquired hypothyroidism    Gastroesophageal reflux disease without esophagitis    At risk for aspiration      ABILIO [place]

## 2019-10-10 NOTE — PLAN OF CARE
Problem: Alteration in Thoughts and Perception  Goal: Verbalize thoughts and feelings  Description  Interventions:  - Promote a nonjudgmental and trusting relationship with the patient through active listening and therapeutic communication  - Assess patient's level of functioning, behavior and potential for risk  - Engage patient in 1 on 1 interactions for a minimum of 15 minutes each session  - Encourage patient to express fears, feelings, frustrations, and discuss symptoms    - [place] patient to reality, help patient recognize reality-based thinking   - Administer medications as ordered and assess for potential side effects  - Provide the patient education related to the signs and symptoms of the illness and desired effects of prescribed medications  Outcome: Progressing  Goal: Agree to be compliant with medication regime, as prescribed and report medication side effects  Description  Interventions:  - Offer appropriate PRN medication and supervise ingestion; conduct aims, as needed   Outcome: Progressing     Problem: Alteration in Thoughts and Perception  Goal: Recognize dysfunctional thoughts, communicate reality-based thoughts at the time of discharge  Description  Interventions:  - Provide medication and psycho-education to assist patient in compliance and developing insight into his/her illness   Outcome: Not Progressing  Goal: Complete daily ADLs, including personal hygiene independently, as able  Description  Interventions:  - Observe, teach, and assist patient with ADLS  - Monitor and promote a balance of rest/activity, with adequate nutrition and elimination   Outcome: Not Progressing   Patient is pleasant and cooperative this shift  She is mostly isolative to her room  She remains on a pureed foods, all liquid diet but she continues to be worried about swallowing    She continues to complain of not feeling well, when asked to elaborate she goes off on tangents about a lot of physical ailments that are delusional   She is noted to be laying in bed napping most of the day  No group attendance noted  GI consulted her today, they offered to do the EGD tomorrow however, Oscar Duncan refused and stated she was not ready to have it done yet  "I want to think about it over the weekend"  Continue to monitor

## 2019-10-10 NOTE — PLAN OF CARE
Problem: Alteration in Thoughts and Perception  Goal: Verbalize thoughts and feelings  Description  Interventions:  - Promote a nonjudgmental and trusting relationship with the patient through active listening and therapeutic communication  - Assess patient's level of functioning, behavior and potential for risk  - Engage patient in 1 on 1 interactions for a minimum of 15 minutes each session  - Encourage patient to express fears, feelings, frustrations, and discuss symptoms    - [place] patient to reality, help patient recognize reality-based thinking   - Administer medications as ordered and assess for potential side effects  - Provide the patient education related to the signs and symptoms of the illness and desired effects of prescribed medications  Outcome: Progressing  Goal: Agree to be compliant with medication regime, as prescribed and report medication side effects  Description  Interventions:  - Offer appropriate PRN medication and supervise ingestion; conduct aims, as needed   Outcome: Progressing  Goal: Attend and participate in unit activities, including therapeutic, recreational, and educational groups  Description  Interventions:  - Provide therapeutic and educational activities daily, encourage attendance and participation, and document same in the medical record     CERTIFIED PEER SPECIALIST INTERVENTIONS:    Complete peer assessment with patient to assess their needs and identify their goals to complete while in the recovery program as well as once discharged into the community  Patient will complete WRAP Plan, Crisis Plan and [facility] Domains  Patient will attend 50% of groups offered on the unit  Patient will complete a goal card weekly      Outcome: Not Progressing  Goal: Recognize dysfunctional thoughts, communicate reality-based thoughts at the time of discharge  Description  Interventions:  - Provide medication and psycho-education to assist patient in compliance and developing insight into his/her illness   Outcome: Not Progressing     Problem: Ineffective Coping  Goal: Participates in unit activities  Description  Interventions:  - Provide therapeutic environment   - Provide required programming   - Redirect inappropriate behaviors   Outcome: Not Progressing     Problem: PAIN - ADULT  Goal: Verbalizes/displays adequate comfort level or baseline comfort level  Description  Interventions:  - Encourage patient to monitor pain and request assistance  - Assess pain using appropriate pain scale  - Administer analgesics based on type and severity of pain and evaluate response  - Implement non-pharmacological measures as appropriate and evaluate response  - Consider cultural and social influences on pain and pain management  - Notify physician/advanced practitioner if interventions unsuccessful or patient reports new pain  Outcome: Progressing     Problem: SAFETY ADULT  Goal: Patient will remain free of falls  Description  INTERVENTIONS:  - Assess patient frequently for physical needs  -  Identify cognitive and physical deficits and behaviors that affect risk of falls    -  [place] fall precautions as indicated by assessment   - Educate patient/family on patient safety including physical limitations  - Instruct patient to call for assistance with activity based on assessment  - Modify environment to reduce risk of injury  - Consider OT/PT consult to assist with strengthening/mobility  Outcome: Progressing     Problem: RESPIRATORY - ADULT  Goal: Achieves optimal ventilation and oxygenation  Description  INTERVENTIONS:  - Assess for changes in respiratory status  - Assess for changes in mentation and behavior  - Position to facilitate oxygenation and minimize respiratory effort  - Oxygen administration by appropriate delivery method based on oxygen saturation (per order) or ABGs  - Initiate smoking cessation education as indicated  - Encourage broncho-pulmonary hygiene including cough, deep breathe, Incentive Spirometry  - Assess the need for suctioning and aspirate as needed  - Assess and instruct to report SOB or any respiratory difficulty  - Respiratory Therapy support as indicated  Outcome: Praneeth Virk is awake and alert, laying down in her bed at the onset of shift  Pleasant upon approach  Remains isolative to her room  Did not attend evening group, even after encourage her to try  Continues to make excuses why she can not part take in group  Focusing on her GI and up coming barium swallow/EGD test   She is complaint with meds after prompting  Ernst Fore did not want her meds in applesauce or in pudding  Remind her that it is an order and staff must follow through with orders  She took her evening meds whole in pudding without difficulties follow by 4 oz of water  She had vanilla pudding as a snack  Sat up for a few minutes prior to retiring to bed  SPo2 before oxygen therapy is 97%RA  O2 at 1l/m with humidifier  Used her incentive spirometer, max at 1100 volume without difficulties  She denies depression but is anxious about GI testing  No overt delusion or a/t/v hallucination noted  No respiratory distress noted  No behavior noted  Will continue to monitor

## 2019-10-10 NOTE — H&P (VIEW-ONLY)
Melody Fox Patient MRN: [id number]  Date of Service: 10/10/2019  Referring Physician: Judith KNOX  Provider Creating Note: ABILIO Grewal  PCP: Natividad Oseguera  Reason for Consult:  Dysphagia  HPI  Melody Fox is a 60 y o  female who was admitted with Schizoaffective disorder, bipolar type ([facility] [address] )  Pt reports she has been experiencing intermittent dysphagia over the past 6 months  Video barium on 10/8, noted large hiatal hernia, questionable LES dysfunction, intermittent retention in esophagus suspected dysmotility  No specific food causes her issues  She has been tolerating her diet  She is tolerating pureed foods, thin liquids, meds crushed in pudding  Per RN, pt has eating potato chips without issues  Per RN, pt refuses to eat at times secondary to psychological issues and anxiety  Denies acid reflux, heartburn, nausea, vomiting, abd pain, melena, or change in bowel habits  Reports sister has a history of Martines's esophagus and keeps up with EGD surveillance  Pt states she has never had an EGD in the past  Denies any significant NSAID use  I discussed EGD with pt, she states she would like to go forward with the evaluation but she is nervous and wants to think about it over the weekend  She would like to have there EGD done on Monday, as long as she has not changed her mind       Past Medical History:   Diagnosis Date    Acid reflux     Anxiety     Asthma     Bipolar 1 disorder (HCC)     Chronic pain disorder     Chronic respiratory failure (HCC)     Compression fracture of fourth lumbar vertebra (HCC)     COPD (chronic obstructive pulmonary disease) (HCC)     Depression     GERD (gastroesophageal reflux disease)     History of home oxygen therapy     Hypothyroidism     Lipoma of upper extremity     Psychiatric illness     Schizoaffective disorder (HCC)     Substance abuse (HCC)     Nicotine    Thoracic compression fracture (HCC)     Ventral hernia      No past surgical history on file  Medications  Home Medications:   Prior to Admission medications    Medication Sig Start Date End Date Taking? Authorizing Provider   acetaminophen (TYLENOL) 325 mg tablet Take 2 tablets (650 mg total) by mouth every 6 (six) hours as needed for mild pain 4/19/19   Oliva Nick PA-C   albuterol (PROVENTIL HFA,VENTOLIN HFA) 90 mcg/act inhaler Inhale 2 puffs every 4 (four) hours as needed for wheezing 4/19/19   Oliva Nick PA-C   cloZAPine (CLOZARIL) 25 mg tablet Take 3 tablets (75 mg total) by mouth daily at bedtime  Patient not taking: Reported on 8/30/2019 7/23/19   ABILIO Goldstein   EPINEPHrine (EPIPEN JR) 0 15 mg/0 3 mL SOAJ Inject 0 3 mL (0 15 mg total) into a muscle once as needed for anaphylaxis As needed for allergic reaction 3/21/19   Criselda Watson MD   FLUoxetine (PROzac) 10 mg capsule Take 1 capsule (10 mg total) by mouth daily 7/24/19   Annette Carolineps, CRNP   levothyroxine 125 mcg tablet Take 1 tablet (125 mcg total) by mouth daily in the early morning 7/24/19   Annette Baumann CRNP   OXYGEN-HELIUM IN Inhale 3 L      Historical Provider, MD   pantoprazole (PROTONIX) 40 mg tablet Take 1 tablet (40 mg total) by mouth daily in the early morning 7/24/19   Annette Velazquezps, CRNP   theophylline (JEF-24) 200 MG 24 hr capsule Take 1 capsule (200 mg total) by mouth daily 4/19/19   Oliva Nick PA-C       Inhouse Medications    Current Facility-Administered Medications:     acetaminophen (TYLENOL) tablet 650 mg, 650 mg, Oral, Q6H PRN, Carissa Willis MD    albuterol (PROVENTIL HFA,VENTOLIN HFA) inhaler 2 puff, 2 puff, Inhalation, Q4H PRN, Carissa Willis MD, 2 puff at 07/25/19 1200    aluminum-magnesium hydroxide-simethicone (MYLANTA) 200-200-20 mg/5 mL oral suspension 15 mL, 15 mL, Oral, Q4H PRN, Carissa Willis MD    ammonium lactate (LAC-HYDRIN) 12 % lotion 1 application, 1 application, Topical, BID PRN, Carissa Willis MD    benzonatate (TESSALON PERLES) capsule 100 mg, 100 mg, Oral, TID PRN, Meldon Curling, MD    benztropine (COGENTIN) injection 1 mg, 1 mg, Intramuscular, Q8H PRN, Meldon Curling, MD    cloZAPine (CLOZARIL) tablet 25 mg, 25 mg, Oral, HS, Romina Granda, JOSE CARLOSNP    cloZAPine (CLOZARIL) tablet 50 mg, 50 mg, Oral, BID, Meldon Curling, MD, 50 mg at 10/09/19 2102    EPINEPHrine PF (ADRENALIN) 1 mg/mL injection 0 15 mg, 0 15 mg, Intramuscular, Once PRN, Meldon Curling, MD    FLUoxetine (PROzac) capsule 20 mg, 20 mg, Oral, Daily, Meldon Curling, MD, 20 mg at 10/10/19 0816    fluticasone-vilanterol (BREO ELLIPTA) 200-25 MCG/INH inhaler 1 puff, 1 puff, Inhalation, Daily, Meldon Curling, MD, 1 puff at 10/10/19 0816    ketotifen (ZADITOR) 0 025 % ophthalmic solution 1 drop, 1 drop, Right Eye, BID PRN, Meldon Curling, MD    levothyroxine tablet 125 mcg, 125 mcg, Oral, Early Morning, Meldon Curling, MD, 125 mcg at 10/10/19 0540    magnesium hydroxide (MILK OF MAGNESIA) 400 mg/5 mL oral suspension 30 mL, 30 mL, Oral, Daily PRN, Meldon Curling, MD    montelukast (SINGULAIR) tablet 10 mg, 10 mg, Oral, HS, Meldon Curling, MD, 10 mg at 10/09/19 2103    OLANZapine (ZyPREXA) IM injection 5 mg, 5 mg, Intramuscular, Q8H PRN, Meldon Curling, MD    OLANZapine (ZyPREXA) tablet 5 mg, 5 mg, Oral, Q8H PRN, Meldon Curling, MD    ondansetron (ZOFRAN-ODT) dispersible tablet 4 mg, 4 mg, Oral, Q6H PRN, ABILIO Farooq    pantoprazole (PROTONIX) EC tablet 40 mg, 40 mg, Oral, Early Morning, Meldon Curling, MD, 40 mg at 10/10/19 0540    polyethylene glycol (MIRALAX) packet 17 g, 17 g, Oral, Daily PRN, Meldon Curling, MD    polyvinyl alcohol (LIQUIFILM TEARS) 1 4 % ophthalmic solution 1 drop, 1 drop, Both Eyes, Q3H PRN, Meldon Curling, MD    theophylline (JEF-24) 24 hr capsule 200 mg, 200 mg, Oral, Daily, Meldon Curling, MD, 200 mg at 10/10/19 0817    tiotropium (SPIRIVA) capsule for inhaler 18 mcg, 18 mcg, Inhalation, Daily, Meldon Curling, MD, 18 mcg at 10/10/19 0816    traZODone (DESYREL) tablet 25 mg, 25 mg, Oral, HS PRN, [place] Israel Hughes MD    Allergies  Allergies   Allergen Reactions    Peanut-Containing Drug Products Anaphylaxis    Shellfish-Derived Products Anaphylaxis    Antihistamines, Chlorpheniramine-Type     Aspirin     Atrovent [Ipratropium]     Elavil [Amitriptyline]     Iodine      Other reaction(s): Unknown Reaction    Levaquin [Levofloxacin]     Novocain [Procaine]     Penicillins      Able to tolerate azithromycin and vancomycin    Prolixin [Fluphenazine]     Sulfa Antibiotics Other (See Comments)     Unknown Zithromax-Tight Throat    Zithromax [Azithromycin] Throat Swelling     Tight throat       Social History   reports that she has been smoking cigarettes  She has been smoking about 0 20 packs per day  She has never used smokeless tobacco  She reports that she does not drink alcohol or use drugs  Family History  Family History   Problem Relation Age of Onset    Arthritis Mother    Sister  Martines's esophagus    ROS  ROS: Denies CP, SOB, fever, weight loss, adenopathy, rash  All others negative except as noted in HPI  Objective   Vitals  /78 (BP Location: Left arm)   Pulse 94   Temp 98 °F (36 7 °C) (Temporal)   Resp 19   Ht 5' 4" (1 626 m)   Wt 67 4 kg (148 lb 9 6 oz)   SpO2 95%   BMI 25 51 kg/m²   General: Alert, no apparent distress  Eyes: No scleral icterus, EOM's intact  ENT: MMM  Card: RRR no murmur  Lungs: Clear to ascultation b/l  No wheezes, rales, rhonchi  Abdomen: Soft  Nontender  Nondistended  Bowel sounds present and normoactive  No hepatosplenomegaly  Skin: No jaundice  Ext: No edema, clubbing, or cyanosis  Psych:  Normal affect    Neuro: Awake, alert and oriented x3      Laboratory Studies  Lab Results   Component Value Date    WBC 8 30 10/05/2019    HGB 14 3 10/05/2019    HCT 43 6 10/05/2019     10/05/2019    MCV 94 10/05/2019     Lab Results   Component Value Date    CREATININE 0 68 08/21/2019    BUN 17 08/21/2019    SODIUM 138 08/21/2019    K 4 3 08/21/2019     08/21/2019    CO2 28 08/21/2019    GLUCOSE 85 05/18/2015    CALCIUM 9 5 08/21/2019    PROT 5 6 (L) 05/11/2015    ALKPHOS 105 08/21/2019    BILITOT 0 3 05/11/2015    AST 19 08/21/2019    ALT 11 08/21/2019     Lab Results   Component Value Date    PROTIME 10 6 08/30/2019    INR 0 95 08/30/2019       Imaging and Other Studies  Video Barium swallow 10/8/19    Assessment and Plan:    1  Dysphagia, diet per Speech  We re-discuss EGD with pt on Monday, she wants to think about whether or not she wants to have the EGD or not over the weekend  2  Large hiatal hernia  3  GERD, stable  4  Schizoaffective bipolar disorder  5   COPD, stable        Principal Problem:    Schizoaffective disorder, bipolar type ([facility] [address] )  Active Problems:    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[facility] [address] )    Acquired hypothyroidism    Gastroesophageal reflux disease without esophagitis    At risk for aspiration      ABILIO Mccloud

## 2019-10-11 ENCOUNTER — ANESTHESIA EVENT (INPATIENT)
Dept: GASTROENTEROLOGY | Facility: HOSPITAL | Age: 64
DRG: 750 | End: 2019-10-11
Payer: COMMERCIAL

## 2019-10-11 RX ADMIN — TIOTROPIUM BROMIDE 18 MCG: 18 CAPSULE ORAL; RESPIRATORY (INHALATION) at 08:33

## 2019-10-11 RX ADMIN — LEVOTHYROXINE SODIUM 125 MCG: 125 TABLET ORAL at 06:18

## 2019-10-11 RX ADMIN — FLUTICASONE FUROATE AND VILANTEROL TRIFENATATE 1 PUFF: 200; 25 POWDER RESPIRATORY (INHALATION) at 08:33

## 2019-10-11 RX ADMIN — CLOZAPINE 50 MG: 25 TABLET ORAL at 21:17

## 2019-10-11 RX ADMIN — THEOPHYLLINE ANHYDROUS 200 MG: 200 CAPSULE, EXTENDED RELEASE ORAL at 08:33

## 2019-10-11 RX ADMIN — CLOZAPINE 50 MG: 25 TABLET ORAL at 18:08

## 2019-10-11 RX ADMIN — MONTELUKAST SODIUM 10 MG: 10 TABLET, COATED ORAL at 21:17

## 2019-10-11 RX ADMIN — FLUOXETINE 20 MG: 20 CAPSULE ORAL at 08:33

## 2019-10-11 RX ADMIN — CLOZAPINE 25 MG: 25 TABLET ORAL at 21:17

## 2019-10-11 RX ADMIN — ALBUTEROL SULFATE 2 PUFF: 90 AEROSOL, METERED RESPIRATORY (INHALATION) at 04:24

## 2019-10-11 RX ADMIN — PANTOPRAZOLE SODIUM 40 MG: 40 TABLET, DELAYED RELEASE ORAL at 06:18

## 2019-10-11 NOTE — PROGRESS NOTES
Individual continues to cough intermittently  RN encouraged her to sit up for a while, allowing for increase volume of breath and offered/ encouraged her to drink water  Resistive to doing so, but did eventually comply    Will continue to monitor/

## 2019-10-11 NOTE — ANESTHESIA PREPROCEDURE EVALUATION
Review of Systems/Medical History  Patient summary reviewed  Chart reviewed      Cardiovascular  EKG reviewed, Negative cardio ROS    Pulmonary  Smoker cigarette smoker  , Tobacco cessation counseling given Cumulative Pack Years: 28, Pneumonia (H/O 5/19), COPD severe- O2 dependent , Asthma Asthma type of rescue: PRN nebulizer, Oxygen dependent nasal cannula,        GI/Hepatic    GERD ,  Hiatal hernia,        Negative  ROS        Endo/Other  History of thyroid disease , hypothyroidism,      GYN  Negative gynecology ROS          Hematology  Negative hematology ROS      Musculoskeletal  Back pain (hx of vertebral fx) , thoracic pain,   Comment: Compression lumber [place] Arthritis     Neurology  Negative neurology ROS      Psychology   Psychiatric history, Anxiety, Depression , depressed and being treated for depression, Schizophrenia    Comment: Schizoaffective disorder         Physical Exam    Airway    Mallampati score: II  TM Distance: >3 FB  Neck ROM: full     Dental       Cardiovascular  Comment: Negative ROS, Cardiovascular exam normal    Pulmonary  Pulmonary exam normal     Other Findings        Anesthesia Plan  ASA Score- 3     Anesthesia Type- IV sedation with anesthesia with ASA Monitors  Additional Monitors:   Airway Plan:         Plan Factors-  Patient did not smoke on day of surgery  Induction- intravenous  Postoperative Plan-     Informed Consent- Anesthetic plan and risks discussed with patient  I personally reviewed this patient with the CRNA  Discussed and agreed on the Anesthesia Plan with the CRNA  Rajinder Souza

## 2019-10-11 NOTE — PLAN OF CARE
Problem: PAIN - ADULT  Goal: Verbalizes/displays adequate comfort level or baseline comfort level  Description  Interventions:  - Encourage patient to monitor pain and request assistance  - Assess pain using appropriate pain scale  - Administer analgesics based on type and severity of pain and evaluate response  - Implement non-pharmacological measures as appropriate and evaluate response  - Consider cultural and social influences on pain and pain management  - Notify physician/advanced practitioner if interventions unsuccessful or patient reports new pain  Outcome: Progressing     Problem: SAFETY ADULT  Goal: Patient will remain free of falls  Description  INTERVENTIONS:  - Assess patient frequently for physical needs  -  Identify cognitive and physical deficits and behaviors that affect risk of falls    -  [place] fall precautions as indicated by assessment   - Educate patient/family on patient safety including physical limitations  - Instruct patient to call for assistance with activity based on assessment  - Modify environment to reduce risk of injury  - Consider OT/PT consult to assist with strengthening/mobility  Outcome: Progressing     Problem: RESPIRATORY - ADULT  Goal: Achieves optimal ventilation and oxygenation  Description  INTERVENTIONS:  - Assess for changes in respiratory status  - Assess for changes in mentation and behavior  - Position to facilitate oxygenation and minimize respiratory effort  - Oxygen administration by appropriate delivery method based on oxygen saturation (per order) or ABGs  - Initiate smoking cessation education as indicated  - Encourage broncho-pulmonary hygiene including cough, deep breathe, Incentive Spirometry  - Assess the need for suctioning and aspirate as needed  - Assess and instruct to report SOB or any respiratory difficulty  - Respiratory Therapy support as indicated  Outcome: Progressing     Problem: SLEEP DISTURBANCE  Goal: Will exhibit normal sleeping pattern  Description  Interventions:  -  Assess the patients sleep pattern, noting recent changes  - Administer medication as ordered  - Decrease environmental stimuli, including noise, as appropriate during the night  - Encourage the patient to actively participate in unit groups and or exercise during the day to enhance ability to achieve adequate sleep at night  - Assess the patient, in the morning, encouraging a description of sleep experience  Outcome: Progressing    ~Maggie is laying in bed with her eyes closed, breath even and unlabored   O2 at 1L with humidifier via NC at HS maintained throughout the night   No respiratory distress noted    Q 15 minutes checks during rounds continues   Maintained on ongoing fall and SAFE precaution without incident on this shift   No indication of pain or discomfort noted   No behavior noted   Will continue to monitor behavior and sleep pattern    ~Maggie has a weekly scheduled blood work, CBC w/Diff to be obtain in the morning

## 2019-10-11 NOTE — PLAN OF CARE
Problem: Alteration in Thoughts and Perception  Goal: Verbalize thoughts and feelings  Description  Interventions:  - Promote a nonjudgmental and trusting relationship with the patient through active listening and therapeutic communication  - Assess patient's level of functioning, behavior and potential for risk  - Engage patient in 1 on 1 interactions for a minimum of 15 minutes each session  - Encourage patient to express fears, feelings, frustrations, and discuss symptoms    - [place] patient to reality, help patient recognize reality-based thinking   - Administer medications as ordered and assess for potential side effects  - Provide the patient education related to the signs and symptoms of the illness and desired effects of prescribed medications  Outcome: Progressing  Goal: Agree to be compliant with medication regime, as prescribed and report medication side effects  Description  Interventions:  - Offer appropriate PRN medication and supervise ingestion; conduct aims, as needed   Outcome: Progressing     Problem: Ineffective Coping  Goal: Patient/Family verbalizes awareness of resources  Outcome: Progressing  Goal: Understands least restrictive measures  Description  Interventions:  - Utilize least restrictive behavior  Outcome: Progressing     Problem: Risk for Self Injury/Neglect  Goal: Verbalize thoughts and feelings  Description  Interventions:  - Assess and re-assess patient's lethality and potential for self-injury  - Engage patient in 1:1 interactions, daily, for a minimum of 15 minutes  - Encourage patient to express feelings, fears, frustrations, hopes  - Establish rapport/trust with patient   Outcome: Progressing  Goal: Refrain from harming self  Description  Interventions:  - Monitor patient closely, per order  - Develop a trusting relationship  - Supervise medication ingestion, monitor effects and side effects   Outcome: Progressing     Problem: Depression  Goal: Verbalize thoughts and feelings  Description  Interventions:  - Assess and re-assess patient's level of risk   - Engage patient in 1:1 interactions, daily, for a minimum of 15 minutes   - Encourage patient to express feelings, fears, frustrations, hopes   Outcome: Progressing     Problem: Anxiety  Goal: Anxiety is at manageable level  Description  Interventions:  - Assess and monitor patient's anxiety level  - Monitor for signs and symptoms of anxiety both physical and emotional (heart palpitations, chest pain, shortness of breath, headaches, nausea, feeling jumpy, restlessness, irritable, apprehensive)  - Collaborate with interdisciplinary team and initiate plan and interventions as ordered    - [place] patient to unit/surroundings  - Explain treatment plan  - Encourage participation in care  - Encourage verbalization of concerns/fears  - Identify coping mechanisms  - Assist in developing anxiety-reducing skills  - Administer/offer alternative therapies  - Limit or eliminate stimulants  Outcome: Progressing     Problem: Alteration in Orientation  Goal: Interact with staff daily  Description  Interventions:  - Assess and re-assess patient's level of orientation  - Engage patient in 1 on 1 interactions, daily, for a minimum of 15 minutes   - Establish rapport/trust with patient   Outcome: Progressing  Goal: Cooperate with recommended testing/procedures  Description  Interventions:  - Determine need for ancillary testing  - Observe for mental status changes  - Implement falls/precaution protocol   Outcome: Progressing     Problem: PAIN - ADULT  Goal: Verbalizes/displays adequate comfort level or baseline comfort level  Description  Interventions:  - Encourage patient to monitor pain and request assistance  - Assess pain using appropriate pain scale  - Administer analgesics based on type and severity of pain and evaluate response  - Implement non-pharmacological measures as appropriate and evaluate response  - Consider cultural and social influences on pain and pain management  - Notify physician/advanced practitioner if interventions unsuccessful or patient reports new pain  Outcome: Progressing     Problem: SAFETY ADULT  Goal: Patient will remain free of falls  Description  INTERVENTIONS:  - Assess patient frequently for physical needs  -  Identify cognitive and physical deficits and behaviors that affect risk of falls    -  [place] fall precautions as indicated by assessment   - Educate patient/family on patient safety including physical limitations  - Instruct patient to call for assistance with activity based on assessment  - Modify environment to reduce risk of injury  - Consider OT/PT consult to assist with strengthening/mobility  Outcome: Progressing     Problem: RESPIRATORY - ADULT  Goal: Achieves optimal ventilation and oxygenation  Description  INTERVENTIONS:  - Assess for changes in respiratory status  - Assess for changes in mentation and behavior  - Position to facilitate oxygenation and minimize respiratory effort  - Oxygen administration by appropriate delivery method based on oxygen saturation (per order) or ABGs  - Initiate smoking cessation education as indicated  - Encourage broncho-pulmonary hygiene including cough, deep breathe, Incentive Spirometry  - Assess the need for suctioning and aspirate as needed  - Assess and instruct to report SOB or any respiratory difficulty  - Respiratory Therapy support as indicated  Outcome: Progressing     Problem: Alteration in Thoughts and Perception  Goal: Treatment Goal: Gain control of psychotic behaviors/thinking, reduce/eliminate presenting symptoms and demonstrate improved reality functioning upon discharge  Outcome: Not Progressing  Goal: Attend and participate in unit activities, including therapeutic, recreational, and educational groups  Description  Interventions:  - Provide therapeutic and educational activities daily, encourage attendance and participation, and document same in the medical record     CERTIFIED PEER SPECIALIST INTERVENTIONS:    Complete peer assessment with patient to assess their needs and identify their goals to complete while in the recovery program as well as once discharged into the community  Patient will complete WRAP Plan, Crisis Plan and [facility] Life Domains  Patient will attend 50% of groups offered on the unit  Patient will complete a goal card weekly      Outcome: Not Progressing  Goal: Complete daily ADLs, including personal hygiene independently, as able  Description  Interventions:  - Observe, teach, and assist patient with ADLS  - Monitor and promote a balance of rest/activity, with adequate nutrition and elimination   Outcome: Not Progressing     Problem: Ineffective Coping  Goal: Demonstrates healthy coping skills  Outcome: Not Progressing  Goal: Participates in unit activities  Description  Interventions:  - Provide therapeutic environment   - Provide required programming   - Redirect inappropriate behaviors   Outcome: Not Progressing     Problem: Risk for Self Injury/Neglect  Goal: Attend and participate in unit activities, including therapeutic, recreational, and educational groups  Description  Interventions:  - Provide therapeutic and educational activities daily, encourage attendance and participation, and document same in the medical record  - Obtain collateral information, encourage visitation and family involvement in care   Outcome: Not Progressing  Goal: Recognize maladaptive responses and adopt new coping mechanisms  Outcome: Not Progressing  Goal: Complete daily ADLs, including personal hygiene independently, as able  Description  Interventions:  - Observe, teach, and assist patient with ADLS  - Monitor and promote a balance of rest/activity, with adequate nutrition and elimination  Outcome: Not Progressing     Problem: Depression  Goal: Refrain from isolation  Description  Interventions:  - Develop a trusting relationship   - Encourage socialization   Outcome: Not Progressing  Goal: Refrain from self-neglect  Outcome: Not Progressing   The patient continues to remain isolative to her room despite encouragement / prompting from staff  She did not attend any unit programming and did not shower  She continues to be non-compliant with staff and doctor recommendations in regards to her somatic complaints (sitting up after meals, ambulating instead of remaining in bed, etc)  Med and meal compliant without prompting  Ate 100% of both meals  Continue to monitor

## 2019-10-11 NOTE — PROGRESS NOTES
10/11/19 0900   Team Meeting   Meeting Type Daily Rounds   Team Members Present   Team Members Present Nurse; Other (Discipline and Name)   Nursing Team Member Joseph Rosenthal RN; Peewee Acharya RN   Other (Discipline and Name) DIANA Zacarias     Barium swallow indicated a hiatal hernia  Coughing episode due to skunk spray outside, nursing interventions applied and patient did comply  Offered EGD and she states she wants to "think about it "  Slept

## 2019-10-11 NOTE — PLAN OF CARE
Problem: Alteration in Thoughts and Perception  Goal: Verbalize thoughts and feelings  Description  Interventions:  - Promote a nonjudgmental and trusting relationship with the patient through active listening and therapeutic communication  - Assess patient's level of functioning, behavior and potential for risk  - Engage patient in 1 on 1 interactions for a minimum of 15 minutes each session  - Encourage patient to express fears, feelings, frustrations, and discuss symptoms    - [place] patient to reality, help patient recognize reality-based thinking   - Administer medications as ordered and assess for potential side effects  - Provide the patient education related to the signs and symptoms of the illness and desired effects of prescribed medications  Outcome: Progressing  Goal: Agree to be compliant with medication regime, as prescribed and report medication side effects  Description  Interventions:  - Offer appropriate PRN medication and supervise ingestion; conduct aims, as needed   Outcome: Progressing     Problem: Alteration in Thoughts and Perception  Goal: Treatment Goal: Gain control of psychotic behaviors/thinking, reduce/eliminate presenting symptoms and demonstrate improved reality functioning upon discharge  Outcome: Not Progressing  Goal: Attend and participate in unit activities, including therapeutic, recreational, and educational groups  Description  Interventions:  - Provide therapeutic and educational activities daily, encourage attendance and participation, and document same in the medical record     CERTIFIED PEER SPECIALIST INTERVENTIONS:    Complete peer assessment with patient to assess their needs and identify their goals to complete while in the recovery program as well as once discharged into the community  Patient will complete WRAP Plan, Crisis Plan and 5 Life Domains  Patient will attend 50% of groups offered on the unit  Patient will complete a goal card weekly  Outcome: Not Progressing  Goal: Recognize dysfunctional thoughts, communicate reality-based thoughts at the time of discharge  Description  Interventions:  - Provide medication and psycho-education to assist patient in compliance and developing insight into his/her illness   Outcome: Not Progressing  Goal: Complete daily ADLs, including personal hygiene independently, as able  Description  Interventions:  - Observe, teach, and assist patient with ADLS  - Monitor and promote a balance of rest/activity, with adequate nutrition and elimination   Outcome: Not Progressing    Patient is isolative to room and self  Out of bed for meds, meals and snack only  Blood Pressure is low at 90/55 and 97/61  Patient verbalizes understanding of need to walk and drink water but declines to get out of bed  She takes in only a minimal amount of water  With evening and HS meds the patient is fixated on not seeing the pills in pudding because of a cataract, which is new claim  She reports that the pudding is to hard to swallow and she prefers yogurt instead  Patient is encouraged to take sip of water after each pill in pudding  She complies and pills go down without difficulty  Patient is highly somatic   Patients pre application of HS O2 Sat was 99% on room air  She utilized her incentive Spirometry while awake and achieves 1000ml  She did not attend groups and does not attend to hygiene  Last shower was on 10/8/19    Will continue to monitor progress in recovery program

## 2019-10-11 NOTE — PROGRESS NOTES
Psychiatry Progress Note    Subjective: Interval History     Ilda White remains somatically preoccupied  She is asking for blood glucose and "more intense" blood pressure monitoring, although she has no particular symptoms associated with either condition  She now reports having a cough that "smells like a skunk " She remains resistive to all recommendations regarding ADLs  She is refusing to shower  She is not remaining upright after meals  She reported shortness of breath during the night but her pulse ox was 98%  She denies HI SI SIBs   Not attending programs or unit activities as she is "too weak for that "    Behavior over the last 24 hours:  unchanged  Sleep: normal  Appetite: normal  Medication side effects: No  ROS: no complaints    Current medications:    Current Facility-Administered Medications:     acetaminophen (TYLENOL) tablet 650 mg, 650 mg, Oral, Q6H PRN, Shaggy Rangel MD    albuterol (PROVENTIL HFA,VENTOLIN HFA) inhaler 2 puff, 2 puff, Inhalation, Q4H PRN, Shaggy Rangel MD, 2 puff at 10/11/19 0424    aluminum-magnesium hydroxide-simethicone (MYLANTA) 200-200-20 mg/5 mL oral suspension 15 mL, 15 mL, Oral, Q4H PRN, Shaggy Rangel MD    ammonium lactate (LAC-HYDRIN) 12 % lotion 1 application, 1 application, Topical, BID PRN, Shaggy Rangel MD    benzonatate (TESSALON PERLES) capsule 100 mg, 100 mg, Oral, TID PRN, Shaggy Rangel MD    benztropine (COGENTIN) injection 1 mg, 1 mg, Intramuscular, Q8H PRN, Shaggy Rangel MD    cloZAPine (CLOZARIL) tablet 25 mg, 25 mg, Oral, HS, ABILIO Ariza, 25 mg at 10/10/19 2124    cloZAPine (CLOZARIL) tablet 50 mg, 50 mg, Oral, BID, Shaggy Rangel MD, 50 mg at 10/10/19 2124    EPINEPHrine PF (ADRENALIN) 1 mg/mL injection 0 15 mg, 0 15 mg, Intramuscular, Once PRN, Shaggy Rangel MD    FLUoxetine (PROzac) capsule 20 mg, 20 mg, Oral, Daily, Shaggy Rangel MD, 20 mg at 10/11/19 0833    fluticasone-vilanterol (BREO ELLIPTA) 200-25 MCG/INH inhaler 1 puff, 1 puff, Inhalation, Daily, Meldon Curling, MD, 1 puff at 10/11/19 0833    ketotifen (ZADITOR) 0 025 % ophthalmic solution 1 drop, 1 drop, Right Eye, BID PRN, Meldon Curling, MD    levothyroxine tablet 125 mcg, 125 mcg, Oral, Early Morning, Meldon Curling, MD, 125 mcg at 10/11/19 0618    magnesium hydroxide (MILK OF MAGNESIA) 400 mg/5 mL oral suspension 30 mL, 30 mL, Oral, Daily PRN, Meldon Curling, MD    montelukast (SINGULAIR) tablet 10 mg, 10 mg, Oral, HS, Meldon Curling, MD, 10 mg at 10/10/19 2123    OLANZapine (ZyPREXA) IM injection 5 mg, 5 mg, Intramuscular, Q8H PRN, Meldon Curling, MD    OLANZapine (ZyPREXA) tablet 5 mg, 5 mg, Oral, Q8H PRN, Meldon Curling, MD    ondansetron (ZOFRAN-ODT) dispersible tablet 4 mg, 4 mg, Oral, Q6H PRN, ABILIO Farooq    pantoprazole (PROTONIX) EC tablet 40 mg, 40 mg, Oral, Early Morning, Meldon Curling, MD, 40 mg at 10/11/19 0618    polyethylene glycol (MIRALAX) packet 17 g, 17 g, Oral, Daily PRN, Meldon Curling, MD    polyvinyl alcohol (LIQUIFILM TEARS) 1 4 % ophthalmic solution 1 drop, 1 drop, Both Eyes, Q3H PRN, Meldon Curling, MD    theophylline (JEF-24) 24 hr capsule 200 mg, 200 mg, Oral, Daily, Meldon Curling, MD, 200 mg at 10/11/19 5037    tiotropium (SPIRIVA) capsule for inhaler 18 mcg, 18 mcg, Inhalation, Daily, Meldon Curling, MD, 18 mcg at 10/11/19 2623    traZODone (DESYREL) tablet 25 mg, 25 mg, Oral, HS PRN, Meldon Curling, MD    Current Problem List:    Patient Active Problem List   Diagnosis    [facility] [address]    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[facility] [address] [facility]    Tobacco use disorder, continuous    Bipolar disorder ([facility] [address] [facility]    Left hip pain    Lactic acidosis    Hypokalemia    Hypomagnesemia    Compression fracture of L4 lumbar vertebra    Thoracic compression fracture (HCC)    Ventral hernia    Parapneumonic effusion    Acute on chronic respiratory failure with hypoxia (HCC)    Chronic respiratory failure (HCC)    Hypophosphatemia    Elevated MCV    Schizoaffective disorder, bipolar type ([facility] [address] )    Acquired hypothyroidism    Gastroesophageal reflux disease without esophagitis    Abnormal CT of the chest    Excessive cerumen in left ear canal    Lipoma of right upper extremity    Polydipsia    Localized swelling of both lower legs    Noncompliant with deep vein thrombosis (DVT) prophylaxis    Allergic conjunctivitis of right eye    At risk for aspiration       Problem list reviewed 10/11/19     Objective:     Vital Signs:  Vitals:    10/10/19 1904 10/11/19 0900 10/11/19 0958 10/11/19 1500   BP: 97/61 96/65 112/68 114/64   BP Location: Left arm Left arm Left arm Left arm   Pulse: 98 99 (!) 106 75   Resp: 18 18  16   Temp: 97 7 °F (36 5 °C) 97 6 °F (36 4 °C)  (!) 97 3 °F (36 3 °C)   TempSrc: Temporal Temporal  Temporal   SpO2: 99%   91%   Weight:       Height:             Appearance:  age appropriate and casually dressed   Behavior:  deviant and evasive   Speech:  pressured   Mood:  anxious   Affect:  mood-congruent   Thought Process:  circumstantial   Thought Content:  delusions  grandiose and somatic   Perceptual Disturbances: None   Risk Potential: none   Sensorium:  person, place, situation and time   Cognition:  intact   Consciousness:  alert and awake    Attention: attention span and concentration were age appropriate   Intellect: average   Insight:  poor   Judgment: poor      Motor Activity: no abnormal movements       I/O Past 24 hours:  No intake/output data recorded  No intake/output data recorded  Labs:  Reviewed 10/11/19      Assessment / Plan:     Schizoaffective disorder, bipolar type ([facility] [address] )    Recommended Treatment:      Medication changes:  1) continue current medication regimen  Non-pharmacological treatments  1) Continue with group therapy, milieu therapy and occupational therapy  Safety  1) Safety/communication plan established targeting dynamic risk factors above      2) Risks, benefits, and possible side effects of medications explained to patient and patient verbalizes understanding  Counseling / Coordination of Care    Total floor / unit time spent today 20 minutes  Greater than 50% of total time was spent with the patient and / or family counseling and / or coordination of care  A description of the counseling / coordination of care  Patient's Rights, confidentiality and exceptions to confidentiality, use of automated medical record, [facility] staff access to medical record, and consent to treatment reviewed      ABILIO Shaw

## 2019-10-11 NOTE — PROGRESS NOTES
The unit was engulf by the smell of skunk, Norris Bennett was asleep and started coughing non stop  Norris Bennett c/o of SOB and feeling that she had to throw up  She was able to bring up a large amount of thick white frothy phlegm  Incentive Spirometer given  Able to max at 1250 in volume  Coughing persist   [email or web address]  PRN Albuterol  INH 2 puff rendered  Encourage her to sit up for a while but opt out and lay down  Will continue to monitor

## 2019-10-12 LAB
BASOPHILS # BLD AUTO: 0.1 THOUSANDS/ΜL (ref 0–0.1)
BASOPHILS NFR BLD AUTO: 1 % (ref 0–1)
EOSINOPHIL # BLD AUTO: 0.2 THOUSAND/ΜL (ref 0–0.4)
EOSINOPHIL NFR BLD AUTO: 2 % (ref 0–6)
ERYTHROCYTE [DISTWIDTH] IN BLOOD BY AUTOMATED COUNT: 13.5 %
HCT VFR BLD AUTO: 42 % (ref 36–46)
HGB BLD-MCNC: 13.7 G/DL (ref 12–16)
LYMPHOCYTES # BLD AUTO: 2.9 THOUSANDS/ΜL (ref 0.5–4)
LYMPHOCYTES NFR BLD AUTO: 36 % (ref 25–45)
MCH RBC QN AUTO: 30.5 PG (ref 26–34)
MCHC RBC AUTO-ENTMCNC: 32.7 G/DL (ref 31–36)
MCV RBC AUTO: 93 FL (ref 80–100)
MONOCYTES # BLD AUTO: 0.6 THOUSAND/ΜL (ref 0.2–0.9)
MONOCYTES NFR BLD AUTO: 7 % (ref 1–10)
NEUTROPHILS # BLD AUTO: 4.4 THOUSANDS/ΜL (ref 1.8–7.8)
NEUTS SEG NFR BLD AUTO: 54 % (ref 45–65)
PLATELET # BLD AUTO: 224 THOUSANDS/UL (ref 150–450)
PMV BLD AUTO: 9.5 FL (ref 8.9–12.7)
RBC # BLD AUTO: 4.51 MILLION/UL (ref 4–5.2)
WBC # BLD AUTO: 8.2 THOUSAND/UL (ref 4.5–11)

## 2019-10-12 PROCEDURE — 85025 COMPLETE CBC W/AUTO DIFF WBC: CPT | Performed by: PSYCHIATRY & NEUROLOGY

## 2019-10-12 RX ADMIN — FLUTICASONE FUROATE AND VILANTEROL TRIFENATATE 1 PUFF: 200; 25 POWDER RESPIRATORY (INHALATION) at 08:47

## 2019-10-12 RX ADMIN — LEVOTHYROXINE SODIUM 125 MCG: 125 TABLET ORAL at 06:02

## 2019-10-12 RX ADMIN — FLUOXETINE 20 MG: 20 CAPSULE ORAL at 08:11

## 2019-10-12 RX ADMIN — CLOZAPINE 50 MG: 25 TABLET ORAL at 17:00

## 2019-10-12 RX ADMIN — CLOZAPINE 50 MG: 25 TABLET ORAL at 21:30

## 2019-10-12 RX ADMIN — CLOZAPINE 25 MG: 25 TABLET ORAL at 21:31

## 2019-10-12 RX ADMIN — TIOTROPIUM BROMIDE 18 MCG: 18 CAPSULE ORAL; RESPIRATORY (INHALATION) at 08:47

## 2019-10-12 RX ADMIN — PANTOPRAZOLE SODIUM 40 MG: 40 TABLET, DELAYED RELEASE ORAL at 06:02

## 2019-10-12 RX ADMIN — MONTELUKAST SODIUM 10 MG: 10 TABLET, COATED ORAL at 21:31

## 2019-10-12 RX ADMIN — THEOPHYLLINE ANHYDROUS 200 MG: 200 CAPSULE, EXTENDED RELEASE ORAL at 08:11

## 2019-10-12 NOTE — PLAN OF CARE
Problem: Alteration in Thoughts and Perception  Goal: Agree to be compliant with medication regime, as prescribed and report medication side effects  Description  Interventions:  - Offer appropriate PRN medication and supervise ingestion; conduct aims, as needed   Outcome: Progressing     Problem: Alteration in Thoughts and Perception  Goal: Attend and participate in unit activities, including therapeutic, recreational, and educational groups  Description  Interventions:  - Provide therapeutic and educational activities daily, encourage attendance and participation, and document same in the medical record     CERTIFIED PEER SPECIALIST INTERVENTIONS:    Complete peer assessment with patient to assess their needs and identify their goals to complete while in the recovery program as well as once discharged into the community  Patient will complete WRAP Plan, Crisis Plan and 5 Life Domains  Patient will attend 50% of groups offered on the unit  Patient will complete a goal card weekly  Outcome: Not Progressing  Goal: Complete daily ADLs, including personal hygiene independently, as able  Description  Interventions:  - Observe, teach, and assist patient with ADLS  - Monitor and promote a balance of rest/activity, with adequate nutrition and elimination   Outcome: Not Progressing     Problem: Ineffective Coping  Goal: Demonstrates healthy coping skills  Outcome: Not Progressing     Problem: Depression  Goal: Refrain from isolation  Description  Interventions:  - Develop a trusting relationship   - Encourage socialization   Outcome: Not Progressing  Goal: Refrain from self-neglect  Outcome: Not Progressing     Pt slightly more visible today, but still isolative to room and self for the most part  Compliant with meds and meals without prompting  Still preoccupied with swallowing study  No complaints with swallowing pureed food  Pt did not attend any groups  Still refusing to shower  Will continue to monitor

## 2019-10-12 NOTE — PROGRESS NOTES
Psychiatry Progress Note    Subjective: Interval History     Patient isolative to her room  Continues to be somatically preoccupied throughout assessment and nursing evaluations  Patient continues to have poor hygiene as she continues to attend to ADLs  Patient expressing some anxiety over her pending EGD on Monday and complications with anesthesia  Expressing concern that she will not wake up  Patient provided Education and support  Patient denying all psychiatric symptoms  Insight remains poor  Has been medication and meal compliant      Behavior over the last 24 hours:  unchanged  Sleep: normal  Appetite: normal  Medication side effects: No  ROS: no complaints    Current medications:    Current Facility-Administered Medications:     acetaminophen (TYLENOL) tablet 650 mg, 650 mg, Oral, Q6H PRN, Jaz Beasley MD    albuterol (PROVENTIL HFA,VENTOLIN HFA) inhaler 2 puff, 2 puff, Inhalation, Q4H PRN, Jaz Beasley MD, 2 puff at 10/11/19 0424    aluminum-magnesium hydroxide-simethicone (MYLANTA) 200-200-20 mg/5 mL oral suspension 15 mL, 15 mL, Oral, Q4H PRN, Jaz Beasley MD    ammonium lactate (LAC-HYDRIN) 12 % lotion 1 application, 1 application, Topical, BID PRN, Jaz Beasley MD    benzonatate (TESSALON PERLES) capsule 100 mg, 100 mg, Oral, TID PRN, Jaz Beasley MD    benztropine (COGENTIN) injection 1 mg, 1 mg, Intramuscular, Q8H PRN, Jaz Beasley MD    cloZAPine (CLOZARIL) tablet 25 mg, 25 mg, Oral, HS, ABILIO Bateman, 25 mg at 10/11/19 2117    cloZAPine (CLOZARIL) tablet 50 mg, 50 mg, Oral, BID, Jaz Beasley MD, 50 mg at 10/11/19 2117    EPINEPHrine PF (ADRENALIN) 1 mg/mL injection 0 15 mg, 0 15 mg, Intramuscular, Once PRN, Jaz Beasley MD    FLUoxetine (PROzac) capsule 20 mg, 20 mg, Oral, Daily, Jaz Beasley MD, 20 mg at 10/12/19 0811    fluticasone-vilanterol (BREO ELLIPTA) 200-25 MCG/INH inhaler 1 puff, 1 puff, Inhalation, Daily, Jaz Beasley MD, 1 puff at 10/12/19 3081    ketotifen (ZADITOR) 0 025 % ophthalmic solution 1 drop, 1 drop, Right Eye, BID PRN, Sindy Day MD    levothyroxine tablet 125 mcg, 125 mcg, Oral, Early Morning, Sindy Day MD, 125 mcg at 10/12/19 0602    magnesium hydroxide (MILK OF MAGNESIA) 400 mg/5 mL oral suspension 30 mL, 30 mL, Oral, Daily PRN, Sindy Day MD    montelukast (SINGULAIR) tablet 10 mg, 10 mg, Oral, HS, Sindy Day MD, 10 mg at 10/11/19 2117    OLANZapine (ZyPREXA) IM injection 5 mg, 5 mg, Intramuscular, Q8H PRN, Sindy Day MD    OLANZapine (ZyPREXA) tablet 5 mg, 5 mg, Oral, Q8H PRN, Sindy Day MD    ondansetron (ZOFRAN-ODT) dispersible tablet 4 mg, 4 mg, Oral, Q6H PRN, ABILIO Bowman    pantoprazole (PROTONIX) EC tablet 40 mg, 40 mg, Oral, Early Morning, Sindy Day MD, 40 mg at 10/12/19 0602    polyethylene glycol (MIRALAX) packet 17 g, 17 g, Oral, Daily PRN, Sindy Day MD    polyvinyl alcohol (LIQUIFILM TEARS) 1 4 % ophthalmic solution 1 drop, 1 drop, Both Eyes, Q3H PRN, Sindy Day MD    theophylline (JEF-24) 24 hr capsule 200 mg, 200 mg, Oral, Daily, Sindy Day MD, 200 mg at 10/12/19 0811    tiotropium (SPIRIVA) capsule for inhaler 18 mcg, 18 mcg, Inhalation, Daily, Sindy Day MD, 18 mcg at 10/12/19 0847    traZODone (DESYREL) tablet 25 mg, 25 mg, Oral, HS PRN, Sindy Day MD    Current Problem List:    Patient Active Problem List   Diagnosis    [facility] [address] [facility]    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[facility] [address] [facility]    Tobacco use disorder, continuous    Bipolar disorder ([facility] [address] [facility]    Left hip pain    Lactic acidosis    Hypokalemia    Hypomagnesemia    Compression fracture of L4 lumbar vertebra    Thoracic compression fracture (HCC)    Ventral hernia    Parapneumonic effusion    Acute on chronic respiratory failure with hypoxia (HCC)    Chronic respiratory failure (HCC)    Hypophosphatemia    Elevated MCV    Schizoaffective disorder, bipolar type ([facility] [address] [facility]    Acquired hypothyroidism    Gastroesophageal reflux disease without esophagitis  Abnormal CT of the chest    Excessive cerumen in left ear canal    Lipoma of right upper extremity    Polydipsia    Localized swelling of both lower legs    Noncompliant with deep vein thrombosis (DVT) prophylaxis    Allergic conjunctivitis of right eye    At risk for aspiration       Problem list reviewed 10/12/19     Objective:     Vital Signs:  Vitals:    10/11/19 0958 10/11/19 1500 10/11/19 1901 10/11/19 2135   BP: 112/68 114/64 110/60    BP Location: Left arm Left arm Left arm    Pulse: (!) 106 75 101    Resp:  16 18    Temp:  (!) 97 3 °F (36 3 °C) 97 5 °F (36 4 °C)    TempSrc:  Temporal Temporal    SpO2:  91% 92% 95%   Weight:       Height:             Appearance:  age appropriate, casually dressed and disheveled   Behavior:  normal   Speech:  normal volume   Mood:  anxious   Affect:  constricted   Thought Process:  normal   Thought Content:  normal   Perceptual Disturbances: None   Risk Potential: none   Sensorium:  person, place and time   Cognition:  intact   Consciousness:  alert and awake    Attention: attention span and concentration were age appropriate   Intellect: average   Insight:  poor   Judgment: limited      Motor Activity: no abnormal movements       I/O Past 24 hours:  No intake/output data recorded  No intake/output data recorded  Labs:  Reviewed 10/12/19    Progress Toward Goals:  unchanged    Assessment / Plan:     Schizoaffective disorder, bipolar type ([facility] [address] )    Recommended Treatment:      Medication changes:  1) continue current treatment plan    Non-pharmacological treatments  1) Continue with group therapy, milieu therapy and occupational therapy  Safety  1) Safety/communication plan established targeting dynamic risk factors above  2) Risks, benefits, and possible side effects of medications explained to patient and patient verbalizes understanding  Counseling / Coordination of Care    Total floor / unit time spent today 20 minutes   Greater than 50% of total time was spent with the patient and / or family counseling and / or coordination of care  A description of the counseling / coordination of care  Patient's Rights, confidentiality and exceptions to confidentiality, use of automated medical record, [facility] staff access to medical record, and consent to treatment reviewed      Leigha Liu PA-C

## 2019-10-12 NOTE — PROGRESS NOTES
Sleeping at this time, no resp distress noted   O2 on via NC, precautions in place, monitoring continues

## 2019-10-12 NOTE — PROGRESS NOTES
Med compliant, swallowed meds whole with water only, no complaints or concerns   Requesting a specific staff member only to draw blood this AM

## 2019-10-12 NOTE — PLAN OF CARE
Problem: Alteration in Thoughts and Perception  Goal: Verbalize thoughts and feelings  Description  Interventions:  - Promote a nonjudgmental and trusting relationship with the patient through active listening and therapeutic communication  - Assess patient's level of functioning, behavior and potential for risk  - Engage patient in 1 on 1 interactions for a minimum of 15 minutes each session  - Encourage patient to express fears, feelings, frustrations, and discuss symptoms    - [place] patient to reality, help patient recognize reality-based thinking   - Administer medications as ordered and assess for potential side effects  - Provide the patient education related to the signs and symptoms of the illness and desired effects of prescribed medications  Outcome: Progressing  Goal: Agree to be compliant with medication regime, as prescribed and report medication side effects  Description  Interventions:  - Offer appropriate PRN medication and supervise ingestion; conduct aims, as needed   Outcome: Progressing     Problem: Depression  Goal: Refrain from isolation  Description  Interventions:  - Develop a trusting relationship   - Encourage socialization   Outcome: Progressing     Problem: Alteration in Thoughts and Perception  Goal: Attend and participate in unit activities, including therapeutic, recreational, and educational groups  Description  Interventions:  - Provide therapeutic and educational activities daily, encourage attendance and participation, and document same in the medical record     CERTIFIED PEER SPECIALIST INTERVENTIONS:    Complete peer assessment with patient to assess their needs and identify their goals to complete while in the recovery program as well as once discharged into the community  Patient will complete WRAP Plan, Crisis Plan and 5 Life Domains  Patient will attend 50% of groups offered on the unit  Patient will complete a goal card weekly      Outcome: Not Progressing  Goal: Complete daily ADLs, including personal hygiene independently, as able  Description  Interventions:  - Observe, teach, and assist patient with ADLS  - Monitor and promote a balance of rest/activity, with adequate nutrition and elimination   Outcome: Not Progressing     [address] Danie Steiner has been more visible out in milieu this shift  Sat out in [place] early in shift, stayed out in dining room after meal about 45' to chat w/staff & peers  Ate 100% of her pureed meal  She is agreeable to having the EGD Monday, but, wished had copy of anesthesia consent as says couldn't read it when signed it due to vision, but, the doctor told her she can have copy after the procedure  Teaching done explaining what is a hiatal hernia & need to stay upright after eating @ least 45'  Was appreciative  Has been taking her medicine whole in bites of yogurt  Is able to drink thin liquids w/no difficulty  Did not address any hygiene; disheveled  Did not attend the [employer]  Did have an HS snack of yogurt  Used the [employer] achieving volumes of 1000-1100ml  Wearing her QHS humidified nasal O2 @ 1L  for bedtime

## 2019-10-13 RX ADMIN — FLUTICASONE FUROATE AND VILANTEROL TRIFENATATE 1 PUFF: 200; 25 POWDER RESPIRATORY (INHALATION) at 08:47

## 2019-10-13 RX ADMIN — LEVOTHYROXINE SODIUM 125 MCG: 125 TABLET ORAL at 06:01

## 2019-10-13 RX ADMIN — FLUOXETINE 20 MG: 20 CAPSULE ORAL at 08:47

## 2019-10-13 RX ADMIN — TIOTROPIUM BROMIDE 18 MCG: 18 CAPSULE ORAL; RESPIRATORY (INHALATION) at 08:47

## 2019-10-13 RX ADMIN — MONTELUKAST SODIUM 10 MG: 10 TABLET, COATED ORAL at 21:38

## 2019-10-13 RX ADMIN — CLOZAPINE 50 MG: 25 TABLET ORAL at 21:38

## 2019-10-13 RX ADMIN — PANTOPRAZOLE SODIUM 40 MG: 40 TABLET, DELAYED RELEASE ORAL at 06:01

## 2019-10-13 RX ADMIN — THEOPHYLLINE ANHYDROUS 200 MG: 200 CAPSULE, EXTENDED RELEASE ORAL at 08:47

## 2019-10-13 RX ADMIN — CLOZAPINE 50 MG: 25 TABLET ORAL at 17:49

## 2019-10-13 RX ADMIN — CLOZAPINE 25 MG: 25 TABLET ORAL at 21:38

## 2019-10-13 NOTE — PROGRESS NOTES
Psychiatry Progress Note    Subjective: Interval History     Patient remained isolative to her room through the majority of the day yesterday  Patient was visible on the unit this morning for breakfast and utilizing the telephone  Patient with ongoing poor hygiene as she continues to attend to ADLs  Patient continuing to endorse anxiety due to pending EGD tomorrow  Continues to have anxiety over being administered  anesthesia and potential complications and not waking up  Again provided Education and support  Continues to lack insight and denies all psychiatric symptoms  Is medication compliant  Compliant with meals  Continues to be encouraged to attend to her ADLs and participate in the milieu      Behavior over the last 24 hours:  unchanged  Sleep: normal  Appetite: normal  Medication side effects: No  ROS: no complaints    Current medications:    Current Facility-Administered Medications:     acetaminophen (TYLENOL) tablet 650 mg, 650 mg, Oral, Q6H PRN, Deep Durand MD    albuterol (PROVENTIL HFA,VENTOLIN HFA) inhaler 2 puff, 2 puff, Inhalation, Q4H PRN, Deep Durand MD, 2 puff at 10/11/19 0424    aluminum-magnesium hydroxide-simethicone (MYLANTA) 200-200-20 mg/5 mL oral suspension 15 mL, 15 mL, Oral, Q4H PRN, Deep Durand MD    ammonium lactate (LAC-HYDRIN) 12 % lotion 1 application, 1 application, Topical, BID PRN, Deep Durand MD    benzonatate (TESSALON PERLES) capsule 100 mg, 100 mg, Oral, TID PRN, Deep Durand MD    benztropine (COGENTIN) injection 1 mg, 1 mg, Intramuscular, Q8H PRN, Deep Durand MD    cloZAPine (CLOZARIL) tablet 25 mg, 25 mg, Oral, HS, Sorin Anderson, ABILIO, 25 mg at 10/12/19 2131    cloZAPine (CLOZARIL) tablet 50 mg, 50 mg, Oral, BID, Deep Durand MD, 50 mg at 10/12/19 2130    EPINEPHrine PF (ADRENALIN) 1 mg/mL injection 0 15 mg, 0 15 mg, Intramuscular, Once PRN, Deep Durand MD    FLUoxetine (PROzac) capsule 20 mg, 20 mg, Oral, Daily, Deep Durand MD, 20 mg at 10/13/19 7130    fluticasone-vilanterol (BREO ELLIPTA) 200-25 MCG/INH inhaler 1 puff, 1 puff, Inhalation, Daily, Zander Yanez MD, 1 puff at 10/13/19 0847    ketotifen (ZADITOR) 0 025 % ophthalmic solution 1 drop, 1 drop, Right Eye, BID PRN, Zander Yanez MD    levothyroxine tablet 125 mcg, 125 mcg, Oral, Early Morning, Zander Yanez MD, 125 mcg at 10/13/19 0601    magnesium hydroxide (MILK OF MAGNESIA) 400 mg/5 mL oral suspension 30 mL, 30 mL, Oral, Daily PRN, Zander Yanez MD    montelukast (SINGULAIR) tablet 10 mg, 10 mg, Oral, HS, Zander Yanez MD, 10 mg at 10/12/19 2131    OLANZapine (ZyPREXA) IM injection 5 mg, 5 mg, Intramuscular, Q8H PRN, Zander Yanez MD    OLANZapine (ZyPREXA) tablet 5 mg, 5 mg, Oral, Q8H PRN, Zander Yanez MD    ondansetron (ZOFRAN-ODT) dispersible tablet 4 mg, 4 mg, Oral, Q6H PRN, ABILIO Mcdermott    pantoprazole (PROTONIX) EC tablet 40 mg, 40 mg, Oral, Early Morning, Zander Yanez MD, 40 mg at 10/13/19 0601    polyethylene glycol (MIRALAX) packet 17 g, 17 g, Oral, Daily PRN, Zander Yanez MD    polyvinyl alcohol (LIQUIFILM TEARS) 1 4 % ophthalmic solution 1 drop, 1 drop, Both Eyes, Q3H PRN, Zander Yanez MD    theophylline (JEF-24) 24 hr capsule 200 mg, 200 mg, Oral, Daily, Zander Yanez MD, 200 mg at 10/13/19 0847    tiotropium (SPIRIVA) capsule for inhaler 18 mcg, 18 mcg, Inhalation, Daily, Zander Yanez MD, 18 mcg at 10/13/19 0847    traZODone (DESYREL) tablet 25 mg, 25 mg, Oral, HS PRN, Zander Yanez MD    Current Problem List:    Patient Active Problem List   Diagnosis    [facility]is [facility]    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[address] )    Tobacco use disorder, continuous    Bipolar disorder ([facility]    Left hip pain    Lactic acidosis    Hypokalemia    Hypomagnesemia    Compression fracture of L4 lumbar vertebra    Thoracic compression fracture ([facility]    Ventral hernia    Parapneumonic effusion    Acute on chronic respiratory failure with hypoxia ([facility]    Chronic respiratory failure ([facility]    Hypophosphatemia    Elevated MCV    Schizoaffective disorder, bipolar type (HCC)    Acquired hypothyroidism    Gastroesophageal reflux disease without esophagitis    Abnormal CT of the chest    Excessive cerumen in left ear canal    Lipoma of right upper extremity    Polydipsia    Localized swelling of both lower legs    Noncompliant with deep vein thrombosis (DVT) prophylaxis    Allergic conjunctivitis of right eye    At risk for aspiration       Problem list reviewed 10/13/19     Objective:     Vital Signs:  Vitals:    10/12/19 1600 10/12/19 2000 10/13/19 0700 10/13/19 0705   BP: 113/60 96/64 99/66 98/63   BP Location: Left arm Left arm Left arm Left arm   Pulse: 83 94 99 101   Resp: 17 18 18 18   Temp: (!) 97 4 °F (36 3 °C) (!) 97 2 °F (36 2 °C) 97 6 °F (36 4 °C)    TempSrc: Temporal Temporal Temporal    SpO2: 99% 97% 96%    Weight:   67 4 kg (148 lb 9 6 oz)    Height:             Appearance:  age appropriate, casually dressed and disheveled   Behavior:  normal   Speech:  normal volume   Mood:  anxious   Affect:  constricted   Thought Process:  normal   Thought Content:  normal   Perceptual Disturbances: None   Risk Potential: none   Sensorium:  person, place and time   Cognition:  intact   Consciousness:  alert and awake    Attention: attention span and concentration were age appropriate   Intellect: average   Insight:  poor   Judgment: limited      Motor Activity: no abnormal movements       I/O Past 24 hours:  No intake/output data recorded  No intake/output data recorded  Labs:  Reviewed 10/13/19    Progress Toward Goals:  unchanged    Assessment / Plan:     Schizoaffective disorder, bipolar type ([facility]    Recommended Treatment:      Medication changes:  1) continue current treatment plan    Non-pharmacological treatments  1) Continue with group therapy, milieu therapy and occupational therapy  Safety  1) Safety/communication plan established targeting dynamic risk factors above      2) Risks, benefits, and possible side effects of medications explained to patient and patient verbalizes understanding  Counseling / Coordination of Care    Total floor / unit time spent today 20 minutes  Greater than 50% of total time was spent with the patient and / or family counseling and / or coordination of care  A description of the counseling / coordination of care  Patient's Rights, confidentiality and exceptions to confidentiality, use of automated medical record, [facility] staff access to medical record, and consent to treatment reviewed      Prasad Hall PA-C

## 2019-10-13 NOTE — PLAN OF CARE
Problem: Alteration in Thoughts and Perception  Goal: Agree to be compliant with medication regime, as prescribed and report medication side effects  Description  Interventions:  - Offer appropriate PRN medication and supervise ingestion; conduct aims, as needed   Outcome: Progressing     Problem: Alteration in Thoughts and Perception  Goal: Attend and participate in unit activities, including therapeutic, recreational, and educational groups  Description  Interventions:  - Provide therapeutic and educational activities daily, encourage attendance and participation, and document same in the medical record     CERTIFIED PEER SPECIALIST INTERVENTIONS:    Complete peer assessment with patient to assess their needs and identify their goals to complete while in the recovery program as well as once discharged into the community  Patient will complete WRAP Plan, Crisis Plan and 5 Life Domains  Patient will attend 50% of groups offered on the unit  Patient will complete a goal card weekly  Outcome: Not Progressing  Goal: Complete daily ADLs, including personal hygiene independently, as able  Description  Interventions:  - Observe, teach, and assist patient with ADLS  - Monitor and promote a balance of rest/activity, with adequate nutrition and elimination   Outcome: Not Progressing     Problem: Depression  Goal: Refrain from isolation  Description  Interventions:  - Develop a trusting relationship   - Encourage socialization   Outcome: Not Progressing  [address] continues to isolate in room in bed  Asleep much of the shift; surprised herself w/how heavily she was sleeping when this nurse awakened her for HS medicine  Did come out for meal, ate 75% of pureed tray  Taking her medicines whole in applesauce w/no difficulty  Had HS snack of applesauce  Did not attend evening programming  Did not attend to hygiene, though, knows tomorrow must shower in preparation for EGD on Monday   Did do her Incentive Spirometry reaching volumes of 1000-1100ml  Is wearing her QHS humidified nasal O2 @ 1L now for bed

## 2019-10-13 NOTE — PLAN OF CARE
Problem: Alteration in Thoughts and Perception  Goal: Agree to be compliant with medication regime, as prescribed and report medication side effects  Description  Interventions:  - Offer appropriate PRN medication and supervise ingestion; conduct aims, as needed   Outcome: Progressing     Problem: Alteration in Thoughts and Perception  Goal: Attend and participate in unit activities, including therapeutic, recreational, and educational groups  Description  Interventions:  - Provide therapeutic and educational activities daily, encourage attendance and participation, and document same in the medical record     CERTIFIED PEER SPECIALIST INTERVENTIONS:    Complete peer assessment with patient to assess their needs and identify their goals to complete while in the recovery program as well as once discharged into the community  Patient will complete WRAP Plan, Crisis Plan and 5 Life Domains  Patient will attend 50% of groups offered on the unit  Patient will complete a goal card weekly  Outcome: Not Progressing  Goal: Complete daily ADLs, including personal hygiene independently, as able  Description  Interventions:  - Observe, teach, and assist patient with ADLS  - Monitor and promote a balance of rest/activity, with adequate nutrition and elimination   Outcome: Not Progressing     Problem: Ineffective Coping  Goal: Demonstrates healthy coping skills  Outcome: Not Progressing     Pt only visible after meds and meals, then returns back to room  Still somatic and preoccupied  Now complaining of food choices  Had to explain to pt that only choices listed on menu can be pureed  Pt did not attend any groups  States she will shower next shift  At 25% of breakfast and lunch  Will continue to monitor

## 2019-10-14 ENCOUNTER — ANESTHESIA (INPATIENT)
Dept: GASTROENTEROLOGY | Facility: HOSPITAL | Age: 64
DRG: 750 | End: 2019-10-14
Payer: COMMERCIAL

## 2019-10-14 ENCOUNTER — APPOINTMENT (INPATIENT)
Dept: RADIOLOGY | Facility: HOSPITAL | Age: 64
DRG: 750 | End: 2019-10-14
Payer: COMMERCIAL

## 2019-10-14 ENCOUNTER — APPOINTMENT (INPATIENT)
Dept: GASTROENTEROLOGY | Facility: HOSPITAL | Age: 64
DRG: 750 | End: 2019-10-14
Payer: COMMERCIAL

## 2019-10-14 LAB — GLUCOSE SERPL-MCNC: 117 MG/DL (ref 65–140)

## 2019-10-14 PROCEDURE — 74220 X-RAY XM ESOPHAGUS 1CNTRST: CPT

## 2019-10-14 PROCEDURE — 0D728ZZ DILATION OF MIDDLE ESOPHAGUS, VIA NATURAL OR ARTIFICIAL OPENING ENDOSCOPIC: ICD-10-PCS | Performed by: INTERNAL MEDICINE

## 2019-10-14 PROCEDURE — 82948 REAGENT STRIP/BLOOD GLUCOSE: CPT

## 2019-10-14 RX ORDER — EPHEDRINE SULFATE 50 MG/ML
INJECTION INTRAVENOUS AS NEEDED
Status: DISCONTINUED | OUTPATIENT
Start: 2019-10-14 | End: 2019-10-14 | Stop reason: SURG

## 2019-10-14 RX ORDER — PROPOFOL 10 MG/ML
INJECTION, EMULSION INTRAVENOUS AS NEEDED
Status: DISCONTINUED | OUTPATIENT
Start: 2019-10-14 | End: 2019-10-14 | Stop reason: SURG

## 2019-10-14 RX ORDER — SODIUM CHLORIDE 9 MG/ML
INJECTION, SOLUTION INTRAVENOUS CONTINUOUS PRN
Status: DISCONTINUED | OUTPATIENT
Start: 2019-10-14 | End: 2019-10-14 | Stop reason: SURG

## 2019-10-14 RX ADMIN — MONTELUKAST SODIUM 10 MG: 10 TABLET, COATED ORAL at 21:13

## 2019-10-14 RX ADMIN — THEOPHYLLINE ANHYDROUS 200 MG: 200 CAPSULE, EXTENDED RELEASE ORAL at 09:31

## 2019-10-14 RX ADMIN — FLUTICASONE FUROATE AND VILANTEROL TRIFENATATE 1 PUFF: 200; 25 POWDER RESPIRATORY (INHALATION) at 07:32

## 2019-10-14 RX ADMIN — PROPOFOL 100 MG: 10 INJECTION, EMULSION INTRAVENOUS at 08:06

## 2019-10-14 RX ADMIN — CLOZAPINE 50 MG: 25 TABLET ORAL at 21:12

## 2019-10-14 RX ADMIN — PROPOFOL 100 MG: 10 INJECTION, EMULSION INTRAVENOUS at 08:04

## 2019-10-14 RX ADMIN — CLOZAPINE 50 MG: 25 TABLET ORAL at 18:19

## 2019-10-14 RX ADMIN — SODIUM CHLORIDE: 0.9 INJECTION, SOLUTION INTRAVENOUS at 07:57

## 2019-10-14 RX ADMIN — FLUOXETINE 20 MG: 20 CAPSULE ORAL at 09:31

## 2019-10-14 RX ADMIN — TIOTROPIUM BROMIDE 18 MCG: 18 CAPSULE ORAL; RESPIRATORY (INHALATION) at 07:33

## 2019-10-14 RX ADMIN — CLOZAPINE 25 MG: 25 TABLET ORAL at 21:12

## 2019-10-14 RX ADMIN — EPHEDRINE SULFATE 10 MG: 50 INJECTION, SOLUTION INTRAVENOUS at 08:16

## 2019-10-14 NOTE — ANESTHESIA POSTPROCEDURE EVALUATION
Post-Op Assessment Note    CV Status:  Stable  Pain Score: 0    Pain management: adequate     Mental Status:  Alert and awake   Hydration Status:  Euvolemic   PONV Controlled:  Controlled   Airway Patency:  Patent   Post Op Vitals Reviewed: Yes      Staff: Anesthesiologist, CRNA           /57 (10/14/19 0827)    Temp      Pulse (!) 106 (10/14/19 0827)   Resp (!) 27 (10/14/19 0827)    SpO2 93 % (10/14/19 0827)

## 2019-10-14 NOTE — PROGRESS NOTES
Pharmacy Clozapine Monitoring Progress Note     Chad Meyer is receiving a total daily dose of 125 mg of clozapine divided as 50mg twice daily and 25mg at bedtime  Pharmacist Recommendations Based on Assessment and Plan (below):    1  Patient is Clozapine-REMS eligible, ANC was updated, with weekly monitoring frequency and the next [address] is due on 10/19/2019     2  Recommend prophylactic bowel regimen  3  Recommend repeat ECG for myocarditis monitoring and QTC prolongation due to antipsychotic  Assessment/Plan:    Phase of Treatment:     Current phase of treatment is initation/titration  Patient Clozapine REMS Eligibility:     Patient is eligible to receive clozapine and the [address] monitoring frequency is weekly per clozapine REMS  The patient's latest [address] value has been updated into the Clozapine-REMS program          ANC and Clozapine Level (if available)     The most recent [address] was   Lab Results   Component Value Date    NEUTROABS 4 40 10/12/2019    and the next lab is due on 10/19/19  Last Clozapine level (if available):    0   Lab Value Date/Time    CLOZAPINE 324 (L) 08/16/2019 1131     0   Lab Value Date/Time    NCLOZIP 207 08/16/2019 1131           Monitoring Parameters for Clozapine:     Clozapine Adverse Effect Suggested Monitoring Patient's Current Status    Agranulocytosis ANC baseline and then repeat weekly for first 6 months and every 2 weeks for the second 6 months  Maintenance after one year of therapy every month  The most recent [address] was   Lab Results   Component Value Date    NEUTROABS 4 40 10/12/2019       This ANC is considered normal range (ANC >/= 1500/mcL)  Continue current treatment and monitoring course     Respiratory Depression Please ensure patient is not on concomitant respiratory lowering medications such as benzodiazepines, sedative hypnotics (eg  zolpidem) This patient is not on respiratory depression lowering medications   Myocarditis Baseline and weekly EKG, CRP, BNP, and troponin  Weekly symptomatic complaints of fatigue, dyspnea, tachycardia, chest pain, palpitations, and fever for the first 4 weeks  Last EKG Results on  8/21/19 showed:  T wave abnormalities    Last QTC value was:  0   Lab Value Date/Time    QTCINT 427 08/21/2019 1133       Last BNP was: No results found for: NTBNP    Last Troponin was:  0   Lab Value Date/Time    TROPONINI <0 01 05/01/2019 1318    TROPONINI <0 04 05/10/2015 1948        Orthostatic hypotension/  bradycardia Blood pressure and vital signs      Monitor blood pressure and orthostatic hypotension at least twice daily upon initiation and continue to follow at least once daily afterwards  Patient's last recorded vital signs:       /75   Pulse 101   Temp 97 9 °F (36 6 °C)   Resp 14   Ht 5' 4" (1 626 m)   Wt 67 4 kg (148 lb 9 6 oz)   SpO2 94%   BMI 25 51 kg/m²             Constipation (bowel obstruction due to high anticholinergic clozapine burden) Assess at baseline and weekly during first four months of therapy  Docusate 100mg BID and Miralax 17 grams daily recommended initially  Prophylactic bowel regimen not ordered and it is recommended to order a standing bowel regimen to reduce risk of bowel obstruction associated with clozapine therapy  Sialorrhea Assess at baseline and weekly during first four months of therapy  May be managed using sublingual anticholinergic preparations (atropine 1% eye drops)  Patient is not experiencing sialorrhea  This will continue to be monitored  Please note that per current REMS protocol the provider can submit a treatment rationale that justifies clozapine treatment even if patient parameters are outside of REMS recommendations  The Clozapine REMS is an FDA-mandated program implemented by the manufacturers of clozapine  ([employer]home  u)  Pharmacy will continue to follow patient with team, thank you    Electronically signed by: Sergei Dominguez, PharmD, [address], Clinical Pharmacist - Psychiatry

## 2019-10-14 NOTE — PLAN OF CARE
Problem: Alteration in Thoughts and Perception  Goal: Verbalize thoughts and feelings  Description  Interventions:  - Promote a nonjudgmental and trusting relationship with the patient through active listening and therapeutic communication  - Assess patient's level of functioning, behavior and potential for risk  - Engage patient in 1 on 1 interactions for a minimum of 15 minutes each session  - Encourage patient to express fears, feelings, frustrations, and discuss symptoms    - [place] patient to reality, help patient recognize reality-based thinking   - Administer medications as ordered and assess for potential side effects  - Provide the patient education related to the signs and symptoms of the illness and desired effects of prescribed medications  Outcome: Progressing  Goal: Agree to be compliant with medication regime, as prescribed and report medication side effects  Description  Interventions:  - Offer appropriate PRN medication and supervise ingestion; conduct aims, as needed   Outcome: Progressing  Goal: Complete daily ADLs, including personal hygiene independently, as able  Description  Interventions:  - Observe, teach, and assist patient with ADLS  - Monitor and promote a balance of rest/activity, with adequate nutrition and elimination   Outcome: Progressing     Problem: Alteration in Thoughts and Perception  Goal: Attend and participate in unit activities, including therapeutic, recreational, and educational groups  Description  Interventions:  - Provide therapeutic and educational activities daily, encourage attendance and participation, and document same in the medical record     CERTIFIED PEER SPECIALIST INTERVENTIONS:    Complete peer assessment with patient to assess their needs and identify their goals to complete while in the recovery program as well as once discharged into the community  Patient will complete WRAP Plan, Crisis Plan and 5 Life Domains      Patient will attend 50% of groups offered on the unit  Patient will complete a goal card weekly  Outcome: Not Progressing  Goal: Recognize dysfunctional thoughts, communicate reality-based thoughts at the time of discharge  Description  Interventions:  - Provide medication and psycho-education to assist patient in compliance and developing insight into his/her illness   Outcome: Not Progressing     [address] is anxious about her EGD procedure tomorrow  With encouragement did shower; nurse shampooed her hair  Throughout, was afraid she would not be able to breathe  Afterwards did notice that she had no difficulty w/the tasks which gave her pause for thought, especially when the nurse told her not to allow fear to rule her  Did eat 50% of her pureed meal, punctual for & took her scheduled medicines whole in applesauce, had an HS snack  Did not attend PM Group  Isolative to room except for meal, medicines  Has sat out for 45' after eating  Is pleasant  Used the [employer] & achieved volumes of 1000-1100ml  Reviewed w/her that she is NPO after midnight tonight  Gave her reassurance that she will be monitored/managed by capable professionals @ her procedure tomorrow  Wearing her QHS humidified nasal O2 @ 1L now for bed  Did express some brief paranoia & delusional perception of the foam ear pieces on the new nasal cannula; "Are those hypnotic ear pieces?" Asserts that the manufacturers put hypnotic suggestion in some medical equipment  Reassured her they are not, can be removed if desired, purely for comfort

## 2019-10-14 NOTE — ADDENDUM NOTE
Addendum  created 10/14/19 0840 by Gail Mccain, DO    Order list changed, Order sets accessed, Sign clinical note

## 2019-10-14 NOTE — PLAN OF CARE
Problem: Ineffective Coping  Goal: Identifies ineffective coping skills  Outcome: Not Progressing  Goal: Participates in unit activities  Description  Interventions:  - Provide therapeutic environment   - Provide required programming   - Redirect inappropriate behaviors   Outcome: Not Progressing     Problem: Anxiety  Goal: Anxiety is at manageable level  Description  Interventions:  - Assess and monitor patient's anxiety level  - Monitor for signs and symptoms of anxiety both physical and emotional (heart palpitations, chest pain, shortness of breath, headaches, nausea, feeling jumpy, restlessness, irritable, apprehensive)  - Collaborate with interdisciplinary team and initiate plan and interventions as ordered  - [place] patient to unit/surroundings  - Explain treatment plan  - Encourage participation in care  - Encourage verbalization of concerns/fears  - Identify coping mechanisms  - Assist in developing anxiety-reducing skills  - Administer/offer alternative therapies  - Limit or eliminate stimulants  Outcome: Not Progressing    Individual completed both an EDG and Barium Swallow this morning  She is now focused on the results and using this for reasoning for not participating in programming  She did attend treatment team meeting this morning  She continues to decline eating at meals  This AM she was NPO related to EDG, but also declined lunch, consuming only liquids  RN encouraged he to try eating but she verbalizes being too fearful to do this  She has not had a recent episode of difficulty with her swallowing  Becomes angry when attempting to dictate care and not get her way, not willing to follow direction  Will continue to monitor

## 2019-10-14 NOTE — PROGRESS NOTES
10/11/19 1100   Activity/Group Checklist   Group Other (Comment)  (IMR - Weekly Goal Review)   Attendance Did not attend     Patient was encouraged to attend group, but declined  Patient has very little motivation to do anything recovery-based, is very resistive to any conversation or activity that may challenge her inflexible way of thinking

## 2019-10-14 NOTE — INTERVAL H&P NOTE
H&P reviewed  After examining the patient I find no changes in the patients condition since the H&P had been written      Vitals:    10/13/19 2000   BP: (!) 88/48   Pulse: 96   Resp: 18   Temp: 97 7 °F (36 5 °C)   SpO2: 93%

## 2019-10-14 NOTE — PROGRESS NOTES
Prior to leaving the unit at 0745, she changed into hospital gown, voided, and had AM inhalers  She had declined 0600 doses of protonix and levothyroxine  She has been kept NPO  She reports and appears anxious  She has been challenging staff, not listening to direction  Left unit accompanied by [facility] staff and transport

## 2019-10-14 NOTE — PLAN OF CARE

## 2019-10-14 NOTE — PROGRESS NOTES
Psychiatry Progress Note    Subjective: Interval History     Norris Bennett is very anxious this morning as she is going for the EGD  She reports feeling "worried" all weekend in anticipation of same  She remains somatically preoccupied  She denies depression but admits to feeling anxious  This affected her sleep  She denies any homicidal or suicidal ideations  She remains resistive to hygiene given her somatic preoccupations      Behavior over the last 24 hours:  unchanged  Sleep: normal  Appetite: normal  Medication side effects: No  ROS: no complaints    Current medications:    Current Facility-Administered Medications:     acetaminophen (TYLENOL) tablet 650 mg, 650 mg, Oral, Q6H PRN, Elham Atkins MD    albuterol (PROVENTIL HFA,VENTOLIN HFA) inhaler 2 puff, 2 puff, Inhalation, Q4H PRN, Elham Atkins MD, 2 puff at 10/11/19 0424    aluminum-magnesium hydroxide-simethicone (MYLANTA) 200-200-20 mg/5 mL oral suspension 15 mL, 15 mL, Oral, Q4H PRN, Elham Atkins MD    ammonium lactate (LAC-HYDRIN) 12 % lotion 1 application, 1 application, Topical, BID PRN, Elham Atkins MD    benzonatate (TESSALON PERLES) capsule 100 mg, 100 mg, Oral, TID PRN, Elham Atkins MD    benztropine (COGENTIN) injection 1 mg, 1 mg, Intramuscular, Q8H PRN, Juliorta MD Milly    cloZAPine (CLOZARIL) tablet 25 mg, 25 mg, Oral, HS, Elham Atkins MD, 25 mg at 10/13/19 2138    cloZAPine (CLOZARIL) tablet 50 mg, 50 mg, Oral, BID, Juliorta MD Milly, 50 mg at 10/13/19 2138    EPINEPHrine PF (ADRENALIN) 1 mg/mL injection 0 15 mg, 0 15 mg, Intramuscular, Once PRN, Elham Atkins MD    FLUoxetine (PROzac) capsule 20 mg, 20 mg, Oral, Daily, Elham Atkins MD, 20 mg at 10/14/19 0931    fluticasone-vilanterol (BREO ELLIPTA) 200-25 MCG/INH inhaler 1 puff, 1 puff, Inhalation, Daily, Elham Atkins MD, 1 puff at 10/14/19 0732    ketotifen (ZADITOR) 0 025 % ophthalmic solution 1 drop, 1 drop, Right Eye, BID PRN, Elham Atkins MD  [facility] levothyroxine tablet 125 mcg, 125 mcg, Oral, Early Morning, Azra Simmons MD, 125 mcg at 10/13/19 0601    magnesium hydroxide (MILK OF MAGNESIA) 400 mg/5 mL oral suspension 30 mL, 30 mL, Oral, Daily PRN, Azra Simmons MD    montelukast (SINGULAIR) tablet 10 mg, 10 mg, Oral, HS, Azra Simmons MD, 10 mg at 10/13/19 2138    OLANZapine (ZyPREXA) IM injection 5 mg, 5 mg, Intramuscular, Q8H PRN, Azra Simmons MD    OLANZapine (ZyPREXA) tablet 5 mg, 5 mg, Oral, Q8H PRN, Azra Simmons MD    ondansetron (ZOFRAN-ODT) dispersible tablet 4 mg, 4 mg, Oral, Q6H PRN, Azra Simmons MD    pantoprazole (PROTONIX) EC tablet 40 mg, 40 mg, Oral, Early Morning, Azra Simmons MD, 40 mg at 10/13/19 0601    polyethylene glycol (MIRALAX) packet 17 g, 17 g, Oral, Daily PRN, Azra Simmons MD    polyvinyl alcohol (LIQUIFILM TEARS) 1 4 % ophthalmic solution 1 drop, 1 drop, Both Eyes, Q3H PRN, Azra Simmons MD    theophylline (JEF-24) 24 hr capsule 200 mg, 200 mg, Oral, Daily, Azra Simmons MD, 200 mg at 10/14/19 0931    tiotropium (SPIRIVA) capsule for inhaler 18 mcg, 18 mcg, Inhalation, Daily, Azra Simmons MD, 18 mcg at 10/14/19 6351    traZODone (DESYREL) tablet 25 mg, 25 mg, Oral, HS PRN, Azra Simmons MD    Current Problem List:    Patient Active Problem List   Diagnosis    [facility] [address] [address]    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[address]    Tobacco use disorder, continuous    Bipolar disorder ([address] )    Left hip pain    Lactic acidosis    Hypokalemia    Hypomagnesemia    Compression fracture of L4 lumbar vertebra    Thoracic compression fracture ([address] )    Ventral hernia    Parapneumonic effusion    Acute on chronic respiratory failure with hypoxia (HCC)    Chronic respiratory failure (HCC)    Hypophosphatemia    Elevated MCV    Schizoaffective disorder, bipolar type ([address] )    Acquired hypothyroidism    Gastroesophageal reflux disease without esophagitis    Abnormal CT of the chest    Excessive cerumen in left ear canal    Lipoma of right upper extremity    Polydipsia    Localized swelling of both lower legs    Noncompliant with deep vein thrombosis (DVT) prophylaxis    Allergic conjunctivitis of right eye    At risk for aspiration       Problem list reviewed 10/14/19     Objective:     Vital Signs:  Vitals:    10/14/19 0700 10/14/19 0822 10/14/19 0827 10/14/19 0837   BP: 105/71 99/57 102/57 110/75   BP Location: Right arm      Pulse: 104 105 (!) 106 101   Resp: 20 (!) 24 (!) 27 14   Temp: 97 9 °F (36 6 °C)      TempSrc:       SpO2:  93% 93% 94%   Weight:       Height:             Appearance:  age appropriate, casually dressed and disheveled   Behavior:  normal   Speech:  normal volume   Mood:  anxious   Affect:  constricted   Thought Process:  normal   Thought Content:  normal   Perceptual Disturbances: None   Risk Potential: none   Sensorium:  person, place and time   Cognition:  intact   Consciousness:  alert and awake    Attention: attention span and concentration were age appropriate   Intellect: average   Insight:  poor   Judgment: limited      Motor Activity: no abnormal movements       I/O Past 24 hours:  No intake/output data recorded  I/O this shift:  In: 100 [I V :100]  Out: -         Labs:  Reviewed 10/14/19    Progress Toward Goals:  unchanged    Assessment / Plan:     [facility]ffective disorder, [facility] ([facility] )    Recommended Treatment:      Medication changes:  1) continue current treatment plan    Non-pharmacological treatments  1) Continue with group therapy, milieu therapy and occupational therapy  Safety  1) Safety/communication plan established targeting dynamic risk factors above  2) Risks, benefits, and possible side effects of medications explained to patient and patient verbalizes understanding  Counseling / Coordination of Care    Total floor / unit time spent today 20 minutes   Greater than 50% of total time was spent with the patient and / or family counseling and / or coordination of care  A description of the counseling / coordination of care  Patient's Rights, confidentiality and exceptions to confidentiality, use of automated medical record, J[facility] Patrick staff access to medical record, and consent to treatment reviewed      ABILIO Glaser

## 2019-10-14 NOTE — PROGRESS NOTES
Patient Name: Gigi Jenkins  Patient MRN: [id number]  Date: 10/14/19  Service: [facility]    Subjective   Gigi Jenkins is a 60 y o  female who was admitted with Schizoaffective disorder, bipolar type ([facility] [address] )  She is stable today  Patient denies: Chest pain, shortness of breath, fever, weight loss, rash, adenopathy  All others negative except as noted in HPI  Objective     Vitals  Blood pressure (!) 88/48, pulse 96, temperature 97 7 °F (36 5 °C), temperature source Temporal, resp  rate 18, height 5' 4" (1 626 m), weight 67 4 kg (148 lb 9 6 oz), SpO2 93 %, not currently breastfeeding  ROS:  Patient denies: Chest pain, shortness of breath, fever, weight loss, rash, adenopathy  All others negative except as noted in HPI  General: Alert, no apparent distress  Eyes: No scleral icterus  ENT: MMM  Card: RRR no murmur  Lungs: Clear to ascultation b/l  No wheezes, rales, rhonchi  Abdomen: Soft  Nontender  Nondistended  Bowel sounds present and normoactive  No hepatosplenomegaly    Skin: No jaundice  Neuro: Alert and oriented x3    Laboratory Studies  Lab Results   Component Value Date    CREATININE 0 68 08/21/2019    BUN 17 08/21/2019    SODIUM 138 08/21/2019    K 4 3 08/21/2019     08/21/2019    CO2 28 08/21/2019    GLUCOSE 85 05/18/2015    CALCIUM 9 5 08/21/2019    PROT 5 6 (L) 05/11/2015    ALKPHOS 105 08/21/2019    BILITOT 0 3 05/11/2015    AST 19 08/21/2019    ALT 11 08/21/2019     Lab Results   Component Value Date    WBC 8 20 10/12/2019    HGB 13 7 10/12/2019    HCT 42 0 10/12/2019     10/12/2019    MCV 93 10/12/2019     Lab Results   Component Value Date    PROTIME 10 6 08/30/2019    INR 0 95 08/30/2019       Inhouse Medications       Current Facility-Administered Medications:     acetaminophen (TYLENOL) tablet 650 mg, 650 mg, Oral, Q6H PRN    albuterol (PROVENTIL HFA,VENTOLIN HFA) inhaler 2 puff, 2 puff, Inhalation, Q4H PRN, 2 puff at 10/11/19 0424    aluminum-magnesium hydroxide-simethicone (MYLANTA) 200-200-20 mg/5 mL oral suspension 15 mL, 15 mL, Oral, Q4H PRN    ammonium lactate (LAC-HYDRIN) 12 % lotion 1 application, 1 application, Topical, BID PRN    benzonatate (TESSALON PERLES) capsule 100 mg, 100 mg, Oral, TID PRN    benztropine (COGENTIN) injection 1 mg, 1 mg, Intramuscular, Q8H PRN    cloZAPine (CLOZARIL) tablet 25 mg, 25 mg, Oral, HS, 25 mg at 10/13/19 2138    cloZAPine (CLOZARIL) tablet 50 mg, 50 mg, Oral, BID, 50 mg at 10/13/19 2138    EPINEPHrine PF (ADRENALIN) 1 mg/mL injection 0 15 mg, 0 15 mg, Intramuscular, Once PRN    FLUoxetine (PROzac) capsule 20 mg, 20 mg, Oral, Daily, 20 mg at 10/13/19 0847    fluticasone-vilanterol (BREO ELLIPTA) 200-25 MCG/INH inhaler 1 puff, 1 puff, Inhalation, Daily, 1 puff at 10/14/19 0732    ketotifen (ZADITOR) 0 025 % ophthalmic solution 1 drop, 1 drop, Right Eye, BID PRN    levothyroxine tablet 125 mcg, 125 mcg, Oral, Early Morning, 125 mcg at 10/13/19 0601    magnesium hydroxide (MILK OF MAGNESIA) 400 mg/5 mL oral suspension 30 mL, 30 mL, Oral, Daily PRN    montelukast (SINGULAIR) tablet 10 mg, 10 mg, Oral, HS, 10 mg at 10/13/19 2138    OLANZapine (ZyPREXA) IM injection 5 mg, 5 mg, Intramuscular, Q8H PRN    OLANZapine (ZyPREXA) tablet 5 mg, 5 mg, Oral, Q8H PRN    ondansetron (ZOFRAN-ODT) dispersible tablet 4 mg, 4 mg, Oral, Q6H PRN    pantoprazole (PROTONIX) EC tablet 40 mg, 40 mg, Oral, Early Morning, 40 mg at 10/13/19 0601    polyethylene glycol (MIRALAX) packet 17 g, 17 g, Oral, Daily PRN    polyvinyl alcohol (LIQUIFILM TEARS) 1 4 % ophthalmic solution 1 drop, 1 drop, Both Eyes, Q3H PRN    theophylline (JEF-24) 24 hr capsule 200 mg, 200 mg, Oral, Daily, 200 mg at 10/13/19 0847    tiotropium (SPIRIVA) capsule for inhaler 18 mcg, 18 mcg, Inhalation, Daily, 18 mcg at 10/14/19 0733    traZODone (DESYREL) tablet 25 mg, 25 mg, Oral, HS PRN      Assessment/Plan:    egd today      Sharath Melendez MD

## 2019-10-14 NOTE — ANESTHESIA POSTPROCEDURE EVALUATION
Post-Op Assessment Note    CV Status:  Stable    Pain management: adequate     Mental Status:  Alert and awake   Hydration Status:  Euvolemic   PONV Controlled:  Controlled   Airway Patency:  Patent   Post Op Vitals Reviewed: Yes      Staff: Anesthesiologist, CRNA           /57 (10/14/19 0827)    Temp      Pulse (!) 106 (10/14/19 0827)   Resp (!) 27 (10/14/19 0827)    SpO2 93 % (10/14/19 0827)

## 2019-10-14 NOTE — PROGRESS NOTES
Pt excused due to medical testing        10/14/19 1100   Activity/Group Checklist   Group   (IMR/ Positive/Negative Self Talk )   Attendance Did not attend   Affect/Mood IRMA

## 2019-10-14 NOTE — PROGRESS NOTES
Sleeping at this time, no s/s of distress noted, O2 on al 1L, all precautions in place, monitoring continues

## 2019-10-14 NOTE — PROGRESS NOTES
10/14/19 0900   Team Meeting   Meeting Type Daily Rounds   Team Members Present   Team Members Present Nurse; Other (Discipline and Name)   Nursing Team Member Kandice Kenny RN   Other (Discipline and Name) Lindsey Cruz     Patient had EGD done this morning, will await results  Patient has been somatic and did not participate in any recovery-based programming on the unit this weekend

## 2019-10-15 RX ADMIN — FLUOXETINE 20 MG: 20 CAPSULE ORAL at 08:32

## 2019-10-15 RX ADMIN — FLUTICASONE FUROATE AND VILANTEROL TRIFENATATE 1 PUFF: 200; 25 POWDER RESPIRATORY (INHALATION) at 08:32

## 2019-10-15 RX ADMIN — PANTOPRAZOLE SODIUM 40 MG: 40 TABLET, DELAYED RELEASE ORAL at 05:51

## 2019-10-15 RX ADMIN — CLOZAPINE 25 MG: 25 TABLET ORAL at 21:03

## 2019-10-15 RX ADMIN — CLOZAPINE 50 MG: 25 TABLET ORAL at 21:04

## 2019-10-15 RX ADMIN — MONTELUKAST SODIUM 10 MG: 10 TABLET, COATED ORAL at 21:03

## 2019-10-15 RX ADMIN — CLOZAPINE 50 MG: 25 TABLET ORAL at 17:26

## 2019-10-15 RX ADMIN — TIOTROPIUM BROMIDE 18 MCG: 18 CAPSULE ORAL; RESPIRATORY (INHALATION) at 08:32

## 2019-10-15 RX ADMIN — LEVOTHYROXINE SODIUM 125 MCG: 125 TABLET ORAL at 05:51

## 2019-10-15 RX ADMIN — THEOPHYLLINE ANHYDROUS 200 MG: 200 CAPSULE, EXTENDED RELEASE ORAL at 08:32

## 2019-10-15 NOTE — PLAN OF CARE
Problem: Alteration in Thoughts and Perception  Goal: Agree to be compliant with medication regime, as prescribed and report medication side effects  Description  Interventions:  - Offer appropriate PRN medication and supervise ingestion; conduct aims, as needed   Outcome: Progressing     Problem: Depression  Goal: Refrain from isolation  Description  Interventions:  - Develop a trusting relationship   - Encourage socialization   Outcome: Not Progressing   Patient continues to be somatic regarding her blood pressure, blood sugar and swallowing  She continues to complain of not feeling well, when asked to elaborate she goes off on tangents about a lot of physical ailments that are delusional   She is noted to be laying in bed napping most of the day  She did attend fresh air group out on the deck  GI saw her today and went over the results of her EGD and barium swallow study, see notes for specifics  Her diet was changed to dental soft, full liquids  Continue to monitor

## 2019-10-15 NOTE — PROGRESS NOTES
Psychiatry Progress Note    Subjective: Interval History     Corey Marroquin was in very good spirits this morning, eager to tell me about her EGD  She remains somatically preoccupied, stating, "I'm preparing myself for the worst " Awaits completion of esophagram  Her food intake has been poor due to her fear of choking  She has been demanding various medical interventions, asking to have her glucose level checked and asking about her blood pressure  She tends to ignore staff when they offer interventions she can employ to address her concerns, such as eating more to avoid hypoglycemia  She was also involved in a verbal disagreement with a peer yesterday  She has been medication adherent  Rarely has "the strength" to go to groups  Resistive to showering due to her physical symptoms  She denies homicidal or suicidal ideations      Behavior over the last 24 hours:  unchanged  Sleep: normal  Appetite: normal  Medication side effects: No  ROS: no complaints    Current medications:    Current Facility-Administered Medications:     acetaminophen (TYLENOL) tablet 650 mg, 650 mg, Oral, Q6H PRN, Gopi Oleary MD    albuterol (PROVENTIL HFA,VENTOLIN HFA) inhaler 2 puff, 2 puff, Inhalation, Q4H PRN, Gopi Oleary MD, 2 puff at 10/11/19 0424    aluminum-magnesium hydroxide-simethicone (MYLANTA) 200-200-20 mg/5 mL oral suspension 15 mL, 15 mL, Oral, Q4H PRN, Gopi Oleary MD    ammonium lactate (LAC-HYDRIN) 12 % lotion 1 application, 1 application, Topical, BID PRN, Gopi Oleary MD    benzonatate (TESSALON PERLES) capsule 100 mg, 100 mg, Oral, TID PRN, Gopi Oleary MD    benztropine (COGENTIN) injection 1 mg, 1 mg, Intramuscular, Q8H PRN, Gopi Oleary MD    cloZAPine (CLOZARIL) tablet 25 mg, 25 mg, Oral, HS, Gopi Oleary MD, 25 mg at 10/14/19 2112    cloZAPine (CLOZARIL) tablet 50 mg, 50 mg, Oral, BID, Gopi Oleary MD, 50 mg at 10/14/19 2112    EPINEPHrine PF (ADRENALIN) 1 mg/mL injection 0 15 mg, 0 15 mg, Intramuscular, Once PRN, Addy Lilly MD    FLUoxetine (PROzac) capsule 20 mg, 20 mg, Oral, Daily, Addy Lilly MD, 20 mg at 10/15/19 0832    fluticasone-vilanterol (BREO ELLIPTA) 200-25 MCG/INH inhaler 1 puff, 1 puff, Inhalation, Daily, Addy Lilly MD, 1 puff at 10/15/19 0832    ketotifen (ZADITOR) 0 025 % ophthalmic solution 1 drop, 1 drop, Right Eye, BID PRN, Addy Lilly MD    levothyroxine tablet 125 mcg, 125 mcg, Oral, Early Morning, Addy Lilly MD, 125 mcg at 10/15/19 0551    magnesium hydroxide (MILK OF MAGNESIA) 400 mg/5 mL oral suspension 30 mL, 30 mL, Oral, Daily PRN, Addy Lilly MD    montelukast (SINGULAIR) tablet 10 mg, 10 mg, Oral, HS, Addy Lilly MD, 10 mg at 10/14/19 2113    OLANZapine (ZyPREXA) IM injection 5 mg, 5 mg, Intramuscular, Q8H PRN, Addy Lilly MD    OLANZapine (ZyPREXA) tablet 5 mg, 5 mg, Oral, Q8H PRN, Addy Lilly MD    ondansetron (ZOFRAN-ODT) dispersible tablet 4 mg, 4 mg, Oral, Q6H PRN, Addy Lilly MD    pantoprazole (PROTONIX) EC tablet 40 mg, 40 mg, Oral, Early Morning, Addy Lilly MD, 40 mg at 10/15/19 0551    polyethylene glycol (MIRALAX) packet 17 g, 17 g, Oral, Daily PRN, Addy Lilly MD    polyvinyl alcohol (LIQUIFILM TEARS) 1 4 % ophthalmic solution 1 drop, 1 drop, Both Eyes, Q3H PRN, Addy Lilly MD    theophylline (JEF-24) 24 hr capsule 200 mg, 200 mg, Oral, Daily, Addy Lilly MD, 200 mg at 10/15/19 4899    tiotropium (SPIRIVA) capsule for inhaler 18 mcg, 18 mcg, Inhalation, Daily, Addy Lilly MD, 18 mcg at 10/15/19 9839    traZODone (DESYREL) tablet 25 mg, 25 mg, Oral, HS PRN, Addy Lilly MD    Current Problem List:    Patient Active Problem List   Diagnosis    [facility] [address] [facility]    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    Tobacco use disorder, continuous    [facility]    Left hip pain    Lactic acidosis    Hypokalemia    Hypomagnesemia    Compression fracture of L4 lumbar vertebra    Thoracic compression fracture (HCC)    Ventral hernia    Parapneumonic effusion    Acute on chronic respiratory failure with hypoxia (HCC)    Chronic respiratory failure (HCC)    Hypophosphatemia    Elevated MCV    Schizoaffective disorder, bipolar type (HCC)    Acquired hypothyroidism    Gastroesophageal reflux disease without esophagitis    Abnormal CT of the chest    Excessive cerumen in left ear canal    Lipoma of right upper extremity    Polydipsia    Localized swelling of both lower legs    Noncompliant with deep vein thrombosis (DVT) prophylaxis    Allergic conjunctivitis of right eye    At risk for aspiration       Problem list reviewed 10/15/19     Objective:     Vital Signs:  Vitals:    10/14/19 1611 10/14/19 1938 10/15/19 0730 10/15/19 0732   BP: 102/56 126/63 107/66 108/75   BP Location: Left arm Left arm Left arm Left arm   Pulse: 88 75 94 98   Resp: 16 18 18    Temp: 97 6 °F (36 4 °C) 97 9 °F (36 6 °C) 98 7 °F (37 1 °C)    TempSrc: Temporal Temporal Temporal    SpO2: 93% 98%     Weight:       Height:             Appearance:  age appropriate, casually dressed and disheveled   Behavior:  normal   Speech:  normal volume   Mood:  anxious   Affect:  constricted   Thought Process:  normal   Thought Content:  normal   Perceptual Disturbances: None   Risk Potential: none   Sensorium:  person, place and time   Cognition:  intact   Consciousness:  alert and awake    Attention: attention span and concentration were age appropriate   Intellect: average   Insight:  poor   Judgment: limited      Motor Activity: no abnormal movements       I/O Past 24 hours:  I/O last 3 completed shifts: In: 100 [I V :100]  Out: -   No intake/output data recorded          Labs:  Reviewed 10/15/19    Progress Toward Goals:  unchanged    Assessment / Plan:     Schizoaffective disorder, bipolar type ([facility] [address] )    Recommended Treatment:      Medication changes:  1) continue current treatment plan    Non-pharmacological treatments  1) Continue with group therapy, milieu therapy and occupational therapy  Safety  1) Safety/communication plan established targeting dynamic risk factors above  2) Risks, benefits, and possible side effects of medications explained to patient and patient verbalizes understanding  Counseling / Coordination of Care    Total floor / unit time spent today 20 minutes  Greater than 50% of total time was spent with the patient and / or family counseling and / or coordination of care  A description of the counseling / coordination of care  Patient's Rights, confidentiality and exceptions to confidentiality, use of automated medical record, [facility] staff access to medical record, and consent to treatment reviewed      ABILIO Wallis

## 2019-10-15 NOTE — PLAN OF CARE
Problem: PAIN - ADULT  Goal: Verbalizes/displays adequate comfort level or baseline comfort level  Description  Interventions:  - Encourage patient to monitor pain and request assistance  - Assess pain using appropriate pain scale  - Administer analgesics based on type and severity of pain and evaluate response  - Implement non-pharmacological measures as appropriate and evaluate response  - Consider cultural and social influences on pain and pain management  - Notify physician/advanced practitioner if interventions unsuccessful or patient reports new pain  Outcome: Progressing     Problem: SAFETY ADULT  Goal: Patient will remain free of falls  Description  INTERVENTIONS:  - Assess patient frequently for physical needs  -  Identify cognitive and physical deficits and behaviors that affect risk of falls    -  [place] fall precautions as indicated by assessment   - Educate patient/family on patient safety including physical limitations  - Instruct patient to call for assistance with activity based on assessment  - Modify environment to reduce risk of injury  - Consider OT/PT consult to assist with strengthening/mobility  Outcome: Progressing     Problem: RESPIRATORY - ADULT  Goal: Achieves optimal ventilation and oxygenation  Description  INTERVENTIONS:  - Assess for changes in respiratory status  - Assess for changes in mentation and behavior  - Position to facilitate oxygenation and minimize respiratory effort  - Oxygen administration by appropriate delivery method based on oxygen saturation (per order) or ABGs  - Initiate smoking cessation education as indicated  - Encourage broncho-pulmonary hygiene including cough, deep breathe, Incentive Spirometry  - Assess the need for suctioning and aspirate as needed  - Assess and instruct to report SOB or any respiratory difficulty  - Respiratory Therapy support as indicated  Outcome: Progressing     Problem: SLEEP DISTURBANCE  Goal: Will exhibit normal sleeping pattern  Description  Interventions:  -  Assess the patients sleep pattern, noting recent changes  - Administer medication as ordered  - Decrease environmental stimuli, including noise, as appropriate during the night  - Encourage the patient to actively participate in unit groups and or exercise during the day to enhance ability to achieve adequate sleep at night  - Assess the patient, in the morning, encouraging a description of sleep experience  Outcome: Sophia De Dios is laying in bed with her eyes closed, breath even and unlabored  O2 at 1L with humidifier via NC at HS maintained throughout the night  No respiratory distress noted  Q 15 minutes checks during rounds continues  Maintained on ongoing fall and SAFE precaution without incident on this shift  No indication of pain or discomfort noted  No behavior noted  Will continue to monitor behavior and sleep pattern

## 2019-10-15 NOTE — PLAN OF CARE
Problem: Alteration in Thoughts and Perception  Goal: Treatment Goal: Gain control of psychotic behaviors/thinking, reduce/eliminate presenting symptoms and demonstrate improved reality functioning upon discharge  Outcome: Progressing  Goal: Verbalize thoughts and feelings  Description  Interventions:  - Promote a nonjudgmental and trusting relationship with the patient through active listening and therapeutic communication  - Assess patient's level of functioning, behavior and potential for risk  - Engage patient in 1 on 1 interactions for a minimum of 15 minutes each session  - Encourage patient to express fears, feelings, frustrations, and discuss symptoms    - [place] patient to reality, help patient recognize reality-based thinking   - Administer medications as ordered and assess for potential side effects  - Provide the patient education related to the signs and symptoms of the illness and desired effects of prescribed medications  Outcome: Progressing  Goal: Agree to be compliant with medication regime, as prescribed and report medication side effects  Description  Interventions:  - Offer appropriate PRN medication and supervise ingestion; conduct aims, as needed   Outcome: Progressing  Goal: Attend and participate in unit activities, including therapeutic, recreational, and educational groups  Description  Interventions:  - Provide therapeutic and educational activities daily, encourage attendance and participation, and document same in the medical record     CERTIFIED PEER SPECIALIST INTERVENTIONS:    Complete peer assessment with patient to assess their needs and identify their goals to complete while in the recovery program as well as once discharged into the community  Patient will complete WRAP Plan, Crisis Plan and 5 Life Domains  Patient will attend 50% of groups offered on the unit  Patient will complete a goal card weekly      Outcome: Progressing  Goal: Recognize dysfunctional thoughts, communicate reality-based thoughts at the time of discharge  Description  Interventions:  - Provide medication and psycho-education to assist patient in compliance and developing insight into his/her illness   Outcome: Progressing  Goal: Complete daily ADLs, including personal hygiene independently, as able  Description  Interventions:  - Observe, teach, and assist patient with ADLS  - Monitor and promote a balance of rest/activity, with adequate nutrition and elimination   Outcome: Progressing     Problem: Ineffective Coping  Goal: Identifies ineffective coping skills  Outcome: Progressing  Goal: Identifies healthy coping skills  Outcome: Progressing  Goal: Demonstrates healthy coping skills  Outcome: Progressing  Goal: Participates in unit activities  Description  Interventions:  - Provide therapeutic environment   - Provide required programming   - Redirect inappropriate behaviors   Outcome: Progressing  Goal: Patient/Family participate in treatment and DC plans  Description  Interventions:  - Provide therapeutic environment  Outcome: Progressing  Goal: Patient/Family verbalizes awareness of resources  Outcome: Progressing  Goal: Understands least restrictive measures  Description  Interventions:  - Utilize least restrictive behavior  Outcome: Progressing     Problem: Risk for Self Injury/Neglect  Goal: Treatment Goal: Remain safe during length of stay, learn and adopt new coping skills, and be free of self-injurious ideation, impulses and acts at the time of discharge  Outcome: Progressing  Goal: Verbalize thoughts and feelings  Description  Interventions:  - Assess and re-assess patient's lethality and potential for self-injury  - Engage patient in 1:1 interactions, daily, for a minimum of 15 minutes  - Encourage patient to express feelings, fears, frustrations, hopes  - Establish rapport/trust with patient   Outcome: Progressing  Goal: Refrain from harming self  Description  Interventions:  - Monitor patient closely, per order  - Develop a trusting relationship  - Supervise medication ingestion, monitor effects and side effects   Outcome: Progressing  Goal: Attend and participate in unit activities, including therapeutic, recreational, and educational groups  Description  Interventions:  - Provide therapeutic and educational activities daily, encourage attendance and participation, and document same in the medical record  - Obtain collateral information, encourage visitation and family involvement in care   Outcome: Progressing  Goal: Recognize maladaptive responses and adopt new coping mechanisms  Outcome: Progressing  Goal: Complete daily ADLs, including personal hygiene independently, as able  Description  Interventions:  - Observe, teach, and assist patient with ADLS  - Monitor and promote a balance of rest/activity, with adequate nutrition and elimination  Outcome: Progressing     Problem: Depression  Goal: Treatment Goal: Demonstrate behavioral control of depressive symptoms, verbalize feelings of improved mood/affect, and adopt new coping skills prior to discharge  Outcome: Progressing  Goal: Verbalize thoughts and feelings  Description  Interventions:  - Assess and re-assess patient's level of risk   - Engage patient in 1:1 interactions, daily, for a minimum of 15 minutes   - Encourage patient to express feelings, fears, frustrations, hopes   Outcome: Progressing  Goal: Refrain from isolation  Description  Interventions:  - Develop a trusting relationship   - Encourage socialization   Outcome: Progressing  Goal: Refrain from self-neglect  Outcome: Progressing     Problem: Anxiety  Goal: Anxiety is at manageable level  Description  Interventions:  - Assess and monitor patient's anxiety level  - Monitor for signs and symptoms of anxiety both physical and emotional (heart palpitations, chest pain, shortness of breath, headaches, nausea, feeling jumpy, restlessness, irritable, apprehensive)     - Collaborate with interdisciplinary team and initiate plan and interventions as ordered  - [place] patient to unit/surroundings  - Explain treatment plan  - Encourage participation in care  - Encourage verbalization of concerns/fears  - Identify coping mechanisms  - Assist in developing anxiety-reducing skills  - Administer/offer alternative therapies  - Limit or eliminate stimulants  Outcome: Progressing     Problem: Alteration in Orientation  Goal: Interact with staff daily  Description  Interventions:  - Assess and re-assess patient's level of orientation  - Engage patient in 1 on 1 interactions, daily, for a minimum of 15 minutes   - Establish rapport/trust with patient   Outcome: Progressing  Goal: Cooperate with recommended testing/procedures  Description  Interventions:  - Determine need for ancillary testing  - Observe for mental status changes  - Implement falls/precaution protocol   Outcome: Progressing     Problem: PAIN - ADULT  Goal: Verbalizes/displays adequate comfort level or baseline comfort level  Description  Interventions:  - Encourage patient to monitor pain and request assistance  - Assess pain using appropriate pain scale  - Administer analgesics based on type and severity of pain and evaluate response  - Implement non-pharmacological measures as appropriate and evaluate response  - Consider cultural and social influences on pain and pain management  - Notify physician/advanced practitioner if interventions unsuccessful or patient reports new pain  Outcome: Progressing     Problem: SAFETY ADULT  Goal: Patient will remain free of falls  Description  INTERVENTIONS:  - Assess patient frequently for physical needs  -  Identify cognitive and physical deficits and behaviors that affect risk of falls    -  [place] fall precautions as indicated by assessment   - Educate patient/family on patient safety including physical limitations  - Instruct patient to call for assistance with activity based on assessment  - Modify environment to reduce risk of injury  - Consider OT/PT consult to assist with strengthening/mobility  Outcome: Progressing     Problem: RESPIRATORY - ADULT  Goal: Achieves optimal ventilation and oxygenation  Description  INTERVENTIONS:  - Assess for changes in respiratory status  - Assess for changes in mentation and behavior  - Position to facilitate oxygenation and minimize respiratory effort  - Oxygen administration by appropriate delivery method based on oxygen saturation (per order) or ABGs  - Initiate smoking cessation education as indicated  - Encourage broncho-pulmonary hygiene including cough, deep breathe, Incentive Spirometry  - Assess the need for suctioning and aspirate as needed  - Assess and instruct to report SOB or any respiratory difficulty  - Respiratory Therapy support as indicated  Outcome: Progressing     Problem: DISCHARGE PLANNING  Goal: Discharge to home or other facility with appropriate resources  Description  INTERVENTIONS:  - Conduct assessment to determine patient/family and health care team treatment goals, and need for post-acute services based on payer coverage, community resources, and patient preferences, and barriers to discharge  - Address psychosocial, clinical, and financial barriers to discharge as identified in assessment in conjunction with the patient/family and health care team  - Assist the patient in reintegration back into the community by removing barriers which may hinder a successful discharge once deemed stable  - Arrange appropriate level of post-acute services according to patient's needs and preference and payer coverage in collaboration with the physician and health care team  - Communicate with and update the patient/family, physician, and health care team regarding progress on the discharge plan  - Arrange appropriate transportation to post-acute venues    Outcome: Progressing     Problem: SLEEP DISTURBANCE  Goal: Will exhibit normal sleeping pattern  Description  Interventions:  -  Assess the patients sleep pattern, noting recent changes  - Administer medication as ordered  - Decrease environmental stimuli, including noise, as appropriate during the night  - Encourage the patient to actively participate in unit groups and or exercise during the day to enhance ability to achieve adequate sleep at night  - Assess the patient, in the morning, encouraging a description of sleep experience  Outcome: Progressing    Patient is maintained on Safe precautions  She is pleasant, cooperative, isolative to room and self  Patient is social with roommate  She had a verbal disagreement with peer in next room  Altercation was defused by staff and patient was redirectable  She denies pain and discomfort  Denies depression  Admits to being anxious  She is somatic and is awaiting results of EGD and swallow study completed today  Patient did not attend group  She was out of bed for meals, meds and snack  She is compliant with meds whole in applesauce without complaint    Will continue to monitor progress in recovery program

## 2019-10-15 NOTE — PLAN OF CARE
Problem: Alteration in Thoughts and Perception  Goal: Attend and participate in unit activities, including therapeutic, recreational, and educational groups  Description  Interventions:  - Provide therapeutic and educational activities daily, encourage attendance and participation, and document same in the medical record     CERTIFIED PEER SPECIALIST INTERVENTIONS:    Complete peer assessment with patient to assess their needs and identify their goals to complete while in the recovery program as well as once discharged into the community  Patient will complete WRAP Plan, Crisis Plan and 5 Life Domains  Patient will attend 50% of groups offered on the unit  Patient will complete a goal card weekly      Outcome: Progressing     Problem: Alteration in Thoughts and Perception  Goal: Complete daily ADLs, including personal hygiene independently, as able  Description  Interventions:  - Observe, teach, and assist patient with ADLS  - Monitor and promote a balance of rest/activity, with adequate nutrition and elimination   Outcome: Not Progressing     Problem: Ineffective Coping  Goal: Identifies ineffective coping skills  Outcome: Not Progressing 15-Oct-2019 01:52

## 2019-10-15 NOTE — PROGRESS NOTES
10/14/19 0930   Team Meeting   Meeting Type Tx Team Meeting   Initial Conference Date 10/14/19   Next Conference Date 10/21/19   Team Members Present   Team Members Present Nurse; Other (Discipline and Name)   Nursing Team Member Mary Hooper RN   Other (Discipline and Name) DIANA [place]; Neha [facility] REYES   Patient/Family Present   Patient Present Yes   Patient's Family Present No     Patient attended treatment team meeting this morning without self-assessment  Patient's group attendance for last week was 8% which is a decrease from last week  Patient had an EGD procedure this morning  She is showering appropriately  Patient was encouraged to balance her efforts towards wellness between her physical and mental issues, as patient seems to focus on physical issues only  Patient became preoccupied with the portable oxygen concentrator being returned to the rental agency, even though she did not use it while on the [facility] unit  Patient is not participating in her mental health recovery  Team and patient completed risk assessment and the patient did not verbalize any desire to elope from the program   Patient verbalized understanding of consequences of eloping from treatment while on a commitment  Patient verbalized no further questions or concerns at the conclusion of the meeting  Next team meeting scheduled for 10/21

## 2019-10-15 NOTE — PROGRESS NOTES
Patient Name: Grecia Dooley  Patient MRN: [id number]  Date: 10/15/19  Service: [facility]    Subjective   Patient is seen lying in bed  She reports improvement of her dysphagia symptoms  She would like more variety in her diet  She denies any dyspepsia/heartburn  She has not yet had a bowel movement this morning but understands that she has not eaten much this past week  She is optimistic that her symptoms have improved  Vitals  Blood pressure 126/63, pulse 75, temperature 97 9 °F (36 6 °C), temperature source Temporal, resp  rate 18, height 5' 4" (1 626 m), weight 67 4 kg (148 lb 9 6 oz), SpO2 98 %, not currently breastfeeding  Physical Exam:     General Appearance:    Awake, alert, oriented x3, no distress, well developed, well    nourished   Head:    Normocephalic without obvious abnormality   Eyes:    PERRL, conjunctiva/corneas clear, EOM's intact        Neck:   Supple, no adenopathy   Throat:   Mucous membranes moist   Lungs:     Clear to auscultation bilaterally, no wheezing or rhonchi   Heart:    Regular rate and rhythm, S1 and S2 normal, no murmur   Abdomen:     Soft, non-tender, non-distended  bowel sounds active  No    masses, rebound or guarding  Extremities:   Extremities normal  No clubbing, cyanosis or edema   Psych  Derm:   Normal affect    No jaundice   Neurologic:   CNII-XII grossly intact   Speech intact         Laboratory Studies  Results from last 7 days   Lab Units 10/12/19  1801   WBC Thousand/uL 8 20   HEMOGLOBIN g/dL 13 7   HEMATOCRIT % 42 0   PLATELETS Thousands/uL 224             Imaging and Other Studies      Inhouse Medications     Current Facility-Administered Medications:     acetaminophen (TYLENOL) tablet 650 mg, 650 mg, Oral, Q6H PRN    albuterol (PROVENTIL HFA,VENTOLIN HFA) inhaler 2 puff, 2 puff, Inhalation, Q4H PRN, 2 puff at 10/11/19 0424    aluminum-magnesium hydroxide-simethicone (MYLANTA) 200-200-20 mg/5 mL oral suspension 15 mL, 15 mL, Oral, Q4H PRN    ammonium lactate (LAC-HYDRIN) 12 % lotion 1 application, 1 application, Topical, BID PRN    benzonatate (TESSALON PERLES) capsule 100 mg, 100 mg, Oral, TID PRN    benztropine (COGENTIN) injection 1 mg, 1 mg, Intramuscular, Q8H PRN    cloZAPine (CLOZARIL) tablet 25 mg, 25 mg, Oral, HS, 25 mg at 10/14/19 2112    cloZAPine (CLOZARIL) tablet 50 mg, 50 mg, Oral, BID, 50 mg at 10/14/19 2112    EPINEPHrine PF (ADRENALIN) 1 mg/mL injection 0 15 mg, 0 15 mg, Intramuscular, Once PRN    FLUoxetine (PROzac) capsule 20 mg, 20 mg, Oral, Daily, 20 mg at 10/15/19 0832    fluticasone-vilanterol (BREO ELLIPTA) 200-25 MCG/INH inhaler 1 puff, 1 puff, Inhalation, Daily, 1 puff at 10/15/19 0832    ketotifen (ZADITOR) 0 025 % ophthalmic solution 1 drop, 1 drop, Right Eye, BID PRN    levothyroxine tablet 125 mcg, 125 mcg, Oral, Early Morning, 125 mcg at 10/15/19 0551    magnesium hydroxide (MILK OF MAGNESIA) 400 mg/5 mL oral suspension 30 mL, 30 mL, Oral, Daily PRN    montelukast (SINGULAIR) tablet 10 mg, 10 mg, Oral, HS, 10 mg at 10/14/19 2113    OLANZapine (ZyPREXA) IM injection 5 mg, 5 mg, Intramuscular, Q8H PRN    OLANZapine (ZyPREXA) tablet 5 mg, 5 mg, Oral, Q8H PRN    ondansetron (ZOFRAN-ODT) dispersible tablet 4 mg, 4 mg, Oral, Q6H PRN    pantoprazole (PROTONIX) EC tablet 40 mg, 40 mg, Oral, Early Morning, 40 mg at 10/15/19 0551    polyethylene glycol (MIRALAX) packet 17 g, 17 g, Oral, Daily PRN    polyvinyl alcohol (LIQUIFILM TEARS) 1 4 % ophthalmic solution 1 drop, 1 drop, Both Eyes, Q3H PRN    theophylline (JEF-24) 24 hr capsule 200 mg, 200 mg, Oral, Daily, 200 mg at 10/15/19 0832    tiotropium (SPIRIVA) capsule for inhaler 18 mcg, 18 mcg, Inhalation, Daily, 18 mcg at 10/15/19 0832    traZODone (DESYREL) tablet 25 mg, 25 mg, Oral, HS PRN      Assessment/Plan:    1  Dysphagia, s/p 10/14/2019 EGD with ([place]) dilatation  EGD findings include large hiatal hernia, unclear if type I vs II  Subsequent esophagram ordered, limited study however very large type I hiatal hernia with kinking of the distal esophagus and proximal dilation reported  Continue daily Protonix  If dilation does not provide long-term benefit, surgical repair could be considered - this was not discussed with the patient given her anxiety and her somatic preoccupation  Will discuss recommendations with GI attending  Diet changed to provide more variety, could advance to regular house diet as tolerated              Shefali Francisco PA-C

## 2019-10-16 RX ADMIN — CLOZAPINE 50 MG: 25 TABLET ORAL at 21:32

## 2019-10-16 RX ADMIN — LEVOTHYROXINE SODIUM 125 MCG: 125 TABLET ORAL at 05:51

## 2019-10-16 RX ADMIN — FLUOXETINE 20 MG: 20 CAPSULE ORAL at 08:49

## 2019-10-16 RX ADMIN — CLOZAPINE 50 MG: 25 TABLET ORAL at 16:59

## 2019-10-16 RX ADMIN — TIOTROPIUM BROMIDE 18 MCG: 18 CAPSULE ORAL; RESPIRATORY (INHALATION) at 08:48

## 2019-10-16 RX ADMIN — MONTELUKAST SODIUM 10 MG: 10 TABLET, COATED ORAL at 21:31

## 2019-10-16 RX ADMIN — PANTOPRAZOLE SODIUM 40 MG: 40 TABLET, DELAYED RELEASE ORAL at 05:51

## 2019-10-16 RX ADMIN — FLUTICASONE FUROATE AND VILANTEROL TRIFENATATE 1 PUFF: 200; 25 POWDER RESPIRATORY (INHALATION) at 08:48

## 2019-10-16 RX ADMIN — CLOZAPINE 25 MG: 25 TABLET ORAL at 21:32

## 2019-10-16 RX ADMIN — THEOPHYLLINE ANHYDROUS 200 MG: 200 CAPSULE, EXTENDED RELEASE ORAL at 08:49

## 2019-10-16 NOTE — PROGRESS NOTES
Psychiatry Progress Note    Subjective: Interval History     Natalie Lopes has been visible in the milieu, although not participating in groups as she is "too weak " She is concerned she is not getting enough nutrition and asks if I will order glucose monitoring "whenever (she) wants it " She appeared somewhat displeased with EGD showing "only a hiatal hernia " She has been sleeping well  Appetite was slightly improved yesterday  She denies depression, anxiety, or psychotic symptoms  She denies homicidal or suicidal ideations       Behavior over the last 24 hours:  unchanged  Sleep: normal  Appetite: normal  Medication side effects: No  ROS: no complaints    Current medications:    Current Facility-Administered Medications:     acetaminophen (TYLENOL) tablet 650 mg, 650 mg, Oral, Q6H PRN, Yoni Meza MD    albuterol (PROVENTIL HFA,VENTOLIN HFA) inhaler 2 puff, 2 puff, Inhalation, Q4H PRN, Yoni Meza MD, 2 puff at 10/11/19 0424    aluminum-magnesium hydroxide-simethicone (MYLANTA) 200-200-20 mg/5 mL oral suspension 15 mL, 15 mL, Oral, Q4H PRN, Yoni Meza MD    ammonium lactate (LAC-HYDRIN) 12 % lotion 1 application, 1 application, Topical, BID PRN, Yoni Meza MD    benzonatate (TESSALON PERLES) capsule 100 mg, 100 mg, Oral, TID PRN, Yoni Meza MD    benztropine (COGENTIN) injection 1 mg, 1 mg, Intramuscular, Q8H PRN, Yoni Meza MD    cloZAPine (CLOZARIL) tablet 25 mg, 25 mg, Oral, HS, Yoni Meza MD, 25 mg at 10/15/19 2103    cloZAPine (CLOZARIL) tablet 50 mg, 50 mg, Oral, BID, Yoni Mzea MD, 50 mg at 10/15/19 2104    EPINEPHrine PF (ADRENALIN) 1 mg/mL injection 0 15 mg, 0 15 mg, Intramuscular, Once PRN, Yoni Meza MD    FLUoxetine (PROzac) capsule 20 mg, 20 mg, Oral, Daily, Yoni Meza MD, 20 mg at 10/16/19 0849    fluticasone-vilanterol (BREO ELLIPTA) 200-25 MCG/INH inhaler 1 puff, 1 puff, Inhalation, Daily, Yoni Meza MD, 1 puff at 10/16/19 0850   ketotifen (ZADITOR) 0 025 % ophthalmic solution 1 drop, 1 drop, Right Eye, BID PRN, Addy Lilly MD    levothyroxine tablet 125 mcg, 125 mcg, Oral, Early Morning, Addy Lilly MD, 125 mcg at 10/16/19 0551    magnesium hydroxide (MILK OF MAGNESIA) 400 mg/5 mL oral suspension 30 mL, 30 mL, Oral, Daily PRN, Addy Lilly MD    montelukast (SINGULAIR) tablet 10 mg, 10 mg, Oral, HS, Addy Lilly MD, 10 mg at 10/15/19 2103    OLANZapine (ZyPREXA) IM injection 5 mg, 5 mg, Intramuscular, Q8H PRN, Addy Lilly MD    OLANZapine (ZyPREXA) tablet 5 mg, 5 mg, Oral, Q8H PRN, Addy Lilly MD    ondansetron (ZOFRAN-ODT) dispersible tablet 4 mg, 4 mg, Oral, Q6H PRN, Addy Lilly MD    pantoprazole (PROTONIX) EC tablet 40 mg, 40 mg, Oral, Early Morning, Addy Lilly MD, 40 mg at 10/16/19 0551    polyethylene glycol (MIRALAX) packet 17 g, 17 g, Oral, Daily PRN, Addy Lilly MD    polyvinyl alcohol (LIQUIFILM TEARS) 1 4 % ophthalmic solution 1 drop, 1 drop, Both Eyes, Q3H PRN, Addy Lilly MD    theophylline (JEF-24) 24 hr capsule 200 mg, 200 mg, Oral, Daily, Addy iLlly MD, 200 mg at 10/16/19 0849    tiotropium (SPIRIVA) capsule for inhaler 18 mcg, 18 mcg, Inhalation, Daily, Addy Lilly MD, 18 mcg at 10/16/19 0848    traZODone (DESYREL) tablet 25 mg, 25 mg, Oral, HS PRN, Addy Lilly MD    Current Problem List:    Patient Active Problem List   Diagnosis    Sepsis ([address] )    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[address] )    Tobacco use disorder, continuous    Bipolar disorder ([address] )    Left hip pain    Lactic acidosis    Hypokalemia    Hypomagnesemia    Compression fracture of L4 lumbar vertebra    Thoracic compression fracture ([address]    Ventral hernia    Parapneumonic effusion    Acute on chronic respiratory failure with hypoxia ([address] )    Chronic respiratory failure ([address] )    Hypophosphatemia    Elevated MCV    Schizoaffective disorder, bipolar type ([address] )    Acquired hypothyroidism    Gastroesophageal reflux disease without esophagitis    Abnormal CT of the chest    Excessive cerumen in left ear canal    Lipoma of right upper extremity    Polydipsia    Localized swelling of both lower legs    Noncompliant with deep vein thrombosis (DVT) prophylaxis    Allergic conjunctivitis of right eye    At risk for aspiration       Problem list reviewed 10/16/19     Objective:     Vital Signs:  Vitals:    10/15/19 0732 10/15/19 1500 10/15/19 1902 10/16/19 0700   BP: 108/75 112/67 100/62 92/71   BP Location: Left arm Left arm Left arm Right arm   Pulse: 98 71 86 92   Resp:  16 16 22   Temp:  97 5 °F (36 4 °C) (!) 97 3 °F (36 3 °C) (!) 97 4 °F (36 3 °C)   TempSrc:  Temporal Temporal    SpO2:  93% 93%    Weight:       Height:             Appearance:  age appropriate, casually dressed and disheveled   Behavior:  normal   Speech:  normal volume   Mood:  anxious   Affect:  constricted   Thought Process:  normal   Thought Content:  normal   Perceptual Disturbances: None   Risk Potential: none   Sensorium:  person, place and time   Cognition:  intact   Consciousness:  alert and awake    Attention: attention span and concentration were age appropriate   Intellect: average   Insight:  poor   Judgment: limited      Motor Activity: no abnormal movements       I/O Past 24 hours:  No intake/output data recorded  No intake/output data recorded  Labs:  Reviewed 10/16/19    Progress Toward Goals:  unchanged    Assessment / Plan:     Schizoaffective disorder, bipolar type ([facility] )    Recommended Treatment:      Medication changes:  1) continue current treatment plan    Non-pharmacological treatments  1) Continue with group therapy, milieu therapy and occupational therapy  Safety  1) Safety/communication plan established targeting dynamic risk factors above  2) Risks, benefits, and possible side effects of medications explained to patient and patient verbalizes understanding        Counseling / Coordination of Care    Total floor / unit time spent today 20 minutes  Greater than 50% of total time was spent with the patient and / or family counseling and / or coordination of care  A description of the counseling / coordination of care  Patient's Rights, confidentiality and exceptions to confidentiality, use of automated medical record, [facility] staff access to medical record, and consent to treatment reviewed      ABILIO Lu

## 2019-10-16 NOTE — PROGRESS NOTES
Patient attended however, no credit was given  Pt claimed she would not partake in activity for it was childish  Patient did not participate in last weeks group either and made excuses why  Patient is beginning to develop pattern of now attending groups and making excuses why she can not participate  This writer will follow her group participation and document further findings       10/15/19 1500   Activity/Group Checklist   Group   (Recovery Workshop )   Attendance Attended   Attendance Duration (min) 46-60   Interactions Did not interact

## 2019-10-16 NOTE — PLAN OF CARE
Problem: PAIN - ADULT  Goal: Verbalizes/displays adequate comfort level or baseline comfort level  Description  Interventions:  - Encourage patient to monitor pain and request assistance  - Assess pain using appropriate pain scale  - Administer analgesics based on type and severity of pain and evaluate response  - Implement non-pharmacological measures as appropriate and evaluate response  - Consider cultural and social influences on pain and pain management  - Notify physician/advanced practitioner if interventions unsuccessful or patient reports new pain  Outcome: Progressing     Problem: SAFETY ADULT  Goal: Patient will remain free of falls  Description  INTERVENTIONS:  - Assess patient frequently for physical needs  -  Identify cognitive and physical deficits and behaviors that affect risk of falls    -  Leiter fall precautions as indicated by assessment   - Educate patient/family on patient safety including physical limitations  - Instruct patient to call for assistance with activity based on assessment  - Modify environment to reduce risk of injury  - Consider OT/PT consult to assist with strengthening/mobility  Outcome: Progressing     Problem: RESPIRATORY - ADULT  Goal: Achieves optimal ventilation and oxygenation  Description  INTERVENTIONS:  - Assess for changes in respiratory status  - Assess for changes in mentation and behavior  - Position to facilitate oxygenation and minimize respiratory effort  - Oxygen administration by appropriate delivery method based on oxygen saturation (per order) or ABGs  - Initiate smoking cessation education as indicated  - Encourage broncho-pulmonary hygiene including cough, deep breathe, Incentive Spirometry  - Assess the need for suctioning and aspirate as needed  - Assess and instruct to report SOB or any respiratory difficulty  - Respiratory Therapy support as indicated  Outcome: Progressing     Problem: SLEEP DISTURBANCE  Goal: Will exhibit normal sleeping pattern  Description  Interventions:  -  Assess the patients sleep pattern, noting recent changes  - Administer medication as ordered  - Decrease environmental stimuli, including noise, as appropriate during the night  - Encourage the patient to actively participate in unit groups and or exercise during the day to enhance ability to achieve adequate sleep at night  - Assess the patient, in the morning, encouraging a description of sleep experience  Outcome: Progressing    Patient asleep at shift onset, utilizing 1L oxygen  No s/s of respiratory distress  Continued to sleep without difficulty  No somatic complaints overnight

## 2019-10-16 NOTE — PROGRESS NOTES
Patient Name: Cathy Arroyo  Patient MRN: [id number]  Date: 10/16/19  Service: [facility]    Subjective   States her swallowing has improved minimally since dilation, still giving her trouble  She is very somatic and focused on her condition  She is also concerned about not meeting her caloric needs because she is eating small amounts  She would like to try a supplement such as Ensure  She denies nausea, vomiting, abdominal pain  Discussed plan with nursing team      Vitals  Blood pressure 100/62, pulse 86, temperature (!) 97 3 °F (36 3 °C), temperature source Temporal, resp  rate 16, height 5' 4" (1 626 m), weight 67 4 kg (148 lb 9 6 oz), SpO2 93 %, not currently breastfeeding  Physical Exam:     General Appearance:    Awake, alert, oriented x3, no distress, well developed, well    nourished   Head:    Normocephalic without obvious abnormality   Eyes:    PERRL, conjunctiva/corneas clear, EOM's intact        Neck:   Supple, no adenopathy   Throat:   Mucous membranes moist   Lungs:     Clear to auscultation bilaterally, no wheezing or rhonchi   Heart:    Regular rate and rhythm, S1 and S2 normal, no murmur   Abdomen:     Soft, non-tender, non-distended  bowel sounds active  No    masses, rebound or guarding  Extremities:   Extremities normal  No clubbing, cyanosis or edema   Psych  Derm:   Anxious    No jaundice   Neurologic:   CNII-XII grossly intact   Speech intact         Laboratory Studies  Results from last 7 days   Lab Units 10/12/19  1801   WBC Thousand/uL 8 20   HEMOGLOBIN g/dL 13 7   HEMATOCRIT % 42 0   PLATELETS Thousands/uL 224             Imaging and Other Studies      Inhouse Medications     Current Facility-Administered Medications:     acetaminophen (TYLENOL) tablet 650 mg, 650 mg, Oral, Q6H PRN    albuterol (PROVENTIL HFA,VENTOLIN HFA) inhaler 2 puff, 2 puff, Inhalation, Q4H PRN, 2 puff at 10/11/19 0424    aluminum-magnesium hydroxide-simethicone (MYLANTA) 200-200-20 mg/5 mL oral suspension 15 mL, 15 mL, Oral, Q4H PRN    ammonium lactate (LAC-HYDRIN) 12 % lotion 1 application, 1 application, Topical, BID PRN    benzonatate (TESSALON PERLES) capsule 100 mg, 100 mg, Oral, TID PRN    benztropine (COGENTIN) injection 1 mg, 1 mg, Intramuscular, Q8H PRN    cloZAPine (CLOZARIL) tablet 25 mg, 25 mg, Oral, HS, 25 mg at 10/15/19 2103    cloZAPine (CLOZARIL) tablet 50 mg, 50 mg, Oral, BID, 50 mg at 10/15/19 2104    EPINEPHrine PF (ADRENALIN) 1 mg/mL injection 0 15 mg, 0 15 mg, Intramuscular, Once PRN    FLUoxetine (PROzac) capsule 20 mg, 20 mg, Oral, Daily, 20 mg at 10/15/19 0832    fluticasone-vilanterol (BREO ELLIPTA) 200-25 MCG/INH inhaler 1 puff, 1 puff, Inhalation, Daily, 1 puff at 10/15/19 0832    ketotifen (ZADITOR) 0 025 % ophthalmic solution 1 drop, 1 drop, Right Eye, BID PRN    levothyroxine tablet 125 mcg, 125 mcg, Oral, Early Morning, 125 mcg at 10/16/19 0551    magnesium hydroxide (MILK OF MAGNESIA) 400 mg/5 mL oral suspension 30 mL, 30 mL, Oral, Daily PRN    montelukast (SINGULAIR) tablet 10 mg, 10 mg, Oral, HS, 10 mg at 10/15/19 2103    OLANZapine (ZyPREXA) IM injection 5 mg, 5 mg, Intramuscular, Q8H PRN    OLANZapine (ZyPREXA) tablet 5 mg, 5 mg, Oral, Q8H PRN    ondansetron (ZOFRAN-ODT) dispersible tablet 4 mg, 4 mg, Oral, Q6H PRN    pantoprazole (PROTONIX) EC tablet 40 mg, 40 mg, Oral, Early Morning, 40 mg at 10/16/19 0551    polyethylene glycol (MIRALAX) packet 17 g, 17 g, Oral, Daily PRN    polyvinyl alcohol (LIQUIFILM TEARS) 1 4 % ophthalmic solution 1 drop, 1 drop, Both Eyes, Q3H PRN    theophylline (JEF-24) 24 hr capsule 200 mg, 200 mg, Oral, Daily, 200 mg at 10/15/19 0832    tiotropium (SPIRIVA) capsule for inhaler 18 mcg, 18 mcg, Inhalation, Daily, 18 mcg at 10/15/19 0832    traZODone (DESYREL) tablet 25 mg, 25 mg, Oral, HS PRN      Assessment/Plan:    1  Dysphagia, s/p 10/14/2019 EGD with ([address]) dilatation    EGD findings of large hiatal hernia  Subsequent esophagram ordered, limited study however very large type I hiatal hernia with kinking of the distal esophagus and proximal dilation reported  Continue daily Protonix  Recommend mechanical soft diet/small meals  If dilation does not provide long-term benefit,  would suggest outpatient esophageal motility study and possible surgical correction if normal motility noted  Surgical correction was not discussed with the patient given her anxiety and her somatic preoccupation  Will add Ensure supplement      Akilah Arana PA-C

## 2019-10-16 NOTE — PROGRESS NOTES
Patient not scheduled to attend      10/15/19 1430   Activity/Group Checklist   Group   (Community Programming Wrap Up )   Attendance Did not attend   Attendance Duration (min) 0-15

## 2019-10-16 NOTE — PLAN OF CARE
Problem: Alteration in Thoughts and Perception  Goal: Agree to be compliant with medication regime, as prescribed and report medication side effects  Description  Interventions:  - Offer appropriate PRN medication and supervise ingestion; conduct aims, as needed   Outcome: Progressing     Problem: Alteration in Thoughts and Perception  Goal: Recognize dysfunctional thoughts, communicate reality-based thoughts at the time of discharge  Description  Interventions:  - Provide medication and psycho-education to assist patient in compliance and developing insight into his/her illness   Outcome: Not Progressing     Problem: Risk for Self Injury/Neglect  Goal: Attend and participate in unit activities, including therapeutic, recreational, and educational groups  Description  Interventions:  - Provide therapeutic and educational activities daily, encourage attendance and participation, and document same in the medical record  - Obtain collateral information, encourage visitation and family involvement in care   Outcome: Not Progressing  Goal: Complete daily ADLs, including personal hygiene independently, as able  Description  Interventions:  - Observe, teach, and assist patient with ADLS  - Monitor and promote a balance of rest/activity, with adequate nutrition and elimination  Outcome: Not Progressing   Patient remains isolative only visible during medication and meal times and then retreats to her room  Lays in bed all day, refusing to get OOB for groups  She continues to complain of not feeling well, when asked to elaborate she goes off on tangents about a lot of physical ailments that are delusional   GI saw her again today to assess how her swallowing feels  Patient continues to state that she "can't swallow"  GI feels that this is psychosomatic as there is nothing that was seen on the EGD or swallow study that would indicate her not being able to swallow  Her diet was changed to mechanical soft, full liquids  Continue to monitor

## 2019-10-16 NOTE — PROGRESS NOTES
10/15/19 0800   Team Meeting   Meeting Type Daily Rounds   Team Members Present   Team Members Present Nurse; Other (Discipline and Name)   Nursing Team Member Ruddy Montoya RN   Other (Discipline and Name) Erika Edd, [place]     Patient had EGD with barium swallow  She has poor intake for fear of choking  Patient demands extensive interventions that are not appropriate for her issues  Nursing is responding to her multiple medical requests and complaints per day  Patient has shown no interest in mental health recovery

## 2019-10-16 NOTE — PROGRESS NOTES
10/16/19 0851   Team Meeting   Meeting Type Daily Rounds   Team Members Present   Team Members Present Nurse;; Other (Discipline and Name)   Nursing Team Member Tonny Santiago RN   Care Management Team Member Brenda Bah   Other (Discipline and Name) Leta Tamez [place]     Patient's diet now soft/thin liquid diet  Has had a better appetite  Slept

## 2019-10-16 NOTE — PROGRESS NOTES
Patient declined      10/15/19 1100   Activity/Group Checklist   Group   (IMR/3 Pillars of Positive Thinking )   Attendance Did not attend   Attendance Duration (min) 46-60   Affect/Mood IRMA

## 2019-10-16 NOTE — PLAN OF CARE
Problem: Alteration in Thoughts and Perception  Goal: Verbalize thoughts and feelings  Description  Interventions:  - Promote a nonjudgmental and trusting relationship with the patient through active listening and therapeutic communication  - Assess patient's level of functioning, behavior and potential for risk  - Engage patient in 1 on 1 interactions for a minimum of 15 minutes each session  - Encourage patient to express fears, feelings, frustrations, and discuss symptoms    - Chase patient to reality, help patient recognize reality-based thinking   - Administer medications as ordered and assess for potential side effects  - Provide the patient education related to the signs and symptoms of the illness and desired effects of prescribed medications  Outcome: Progressing  Goal: Agree to be compliant with medication regime, as prescribed and report medication side effects  Description  Interventions:  - Offer appropriate PRN medication and supervise ingestion; conduct aims, as needed   Outcome: Progressing     Problem: Alteration in Thoughts and Perception  Goal: Treatment Goal: Gain control of psychotic behaviors/thinking, reduce/eliminate presenting symptoms and demonstrate improved reality functioning upon discharge  Outcome: Not Progressing  Goal: Attend and participate in unit activities, including therapeutic, recreational, and educational groups  Description  Interventions:  - Provide therapeutic and educational activities daily, encourage attendance and participation, and document same in the medical record     CERTIFIED PEER SPECIALIST INTERVENTIONS:    Complete peer assessment with patient to assess their needs and identify their goals to complete while in the recovery program as well as once discharged into the community  Patient will complete WRAP Plan, Crisis Plan and [facility] [employer]  Patient will attend 50% of groups offered on the unit  Patient will complete a goal card weekly  Outcome: Not Progressing  Goal: Recognize dysfunctional thoughts, communicate reality-based thoughts at the time of discharge  Description  Interventions:  - Provide medication and psycho-education to assist patient in compliance and developing insight into his/her illness   Outcome: Not Progressing  Goal: Complete daily ADLs, including personal hygiene independently, as able  Description  Interventions:  - Observe, teach, and assist patient with ADLS  - Monitor and promote a balance of rest/activity, with adequate nutrition and elimination   Outcome: Not Progressing    Patient is pleasant, cooperative and out of room for meds, meals and snacks  She maintains a sitting position for 1 hour after meal   Patient c/o feeling full after small amount of food  EGD results reveal hiatal hernia which was addressed by GI and patient was made aware of diet changes and expectations  She voices that she had been afraid of results but now is relieved about same  Patient did not attend group or attend to hygiene  She continues somatic and without motivation to care for herself  She is social with staff and select peers    Will continue to monitor progress in recovery program

## 2019-10-17 PROCEDURE — 99231 SBSQ HOSP IP/OBS SF/LOW 25: CPT | Performed by: PSYCHIATRY & NEUROLOGY

## 2019-10-17 RX ADMIN — TIOTROPIUM BROMIDE 18 MCG: 18 CAPSULE ORAL; RESPIRATORY (INHALATION) at 08:48

## 2019-10-17 RX ADMIN — FLUOXETINE 20 MG: 20 CAPSULE ORAL at 08:18

## 2019-10-17 RX ADMIN — MONTELUKAST SODIUM 10 MG: 10 TABLET, COATED ORAL at 21:15

## 2019-10-17 RX ADMIN — LEVOTHYROXINE SODIUM 125 MCG: 125 TABLET ORAL at 05:42

## 2019-10-17 RX ADMIN — CLOZAPINE 25 MG: 25 TABLET ORAL at 21:14

## 2019-10-17 RX ADMIN — PANTOPRAZOLE SODIUM 40 MG: 40 TABLET, DELAYED RELEASE ORAL at 05:42

## 2019-10-17 RX ADMIN — CLOZAPINE 50 MG: 25 TABLET ORAL at 21:15

## 2019-10-17 RX ADMIN — CLOZAPINE 50 MG: 25 TABLET ORAL at 17:00

## 2019-10-17 RX ADMIN — FLUTICASONE FUROATE AND VILANTEROL TRIFENATATE 1 PUFF: 200; 25 POWDER RESPIRATORY (INHALATION) at 08:48

## 2019-10-17 RX ADMIN — THEOPHYLLINE ANHYDROUS 200 MG: 200 CAPSULE, EXTENDED RELEASE ORAL at 08:18

## 2019-10-17 NOTE — PROGRESS NOTES
Progress Note - Rosana Lindsay 1955, 60 y o  female MRN: [id number]    [facility]02 Encounter: [id number]    Primary Care Provider: Christ Munoz MD   Date and time admitted to hospital: 7/23/2019  5:30 PM        * Schizoaffective disorder, bipolar type [facility])  Assessment & Plan  Psychiatry Progress Note    Patient was interviewed today and progress over last 2 weeks discussed with team   She was seen by speech therapy and a swallowing study was done which only showed a had a hernia and no swallowing difficulty  She has never accepting the fact that she is "psychosomatic" and tells me that she is now taking showers every 2 days in the evening shift  She again was laying back on bed and and has not been attending any groups preferring to lay back in bed claiming that she is not feeling well and her group attend was only 8% last week  She continues to verbalize that she does not feel quite safe on the unit and become psychosomatic claiming that she cannot breathe or swallow even though she does appear to breathe fine while observed and swallowing study was negative  She is still  suspicious about certain staff who gives her medications like her inhalers and the spirometer off and on and has complained that the staff has been ordering her around  She was again reminded that  if she improves her group attendance and attends to her ADLs skills,  we can always look into discharging her back [facility] [place] [facility]  She continues to present with stilted speech being too formal as if talking in a court of law which has not changed either  She does not admit to any overt hallucinations or paranoia but still appears to harbor some covert  paranoia based on her demeanor and interaction on the unit  She no longer believes that there is a micro chip on her right forearm and 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  No current suicidal homicidal thoughts intent or plans reported    No overt hallucinations or other delusions reported   No signs or sxs of agranulocytosis or myocarditis or endocarditis and she is moving her bowels so far with no issues  Patient was again reminded to attend to ADLs skills and take showers at least every other day and attend more groups     She has accepted the slight increase in clozapine recently   Current medications:    Current Facility-Administered Medications:     acetaminophen (TYLENOL) tablet 650 mg, 650 mg, Oral, Q6H PRN, Donnie Bautista MD    albuterol (PROVENTIL HFA,VENTOLIN HFA) inhaler 2 puff, 2 puff, Inhalation, Q4H PRN, Donnie Bautista MD, 2 puff at 10/11/19 0424    aluminum-magnesium hydroxide-simethicone (MYLANTA) 200-200-20 mg/5 mL oral suspension 15 mL, 15 mL, Oral, Q4H PRN, Donnie Bautista MD    ammonium lactate (LAC-HYDRIN) 12 % lotion 1 application, 1 application, Topical, BID PRN, Donnie Bautista MD    benzonatate (TESSALON PERLES) capsule 100 mg, 100 mg, Oral, TID PRN, Donnie Bautista MD    benztropine (COGENTIN) injection 1 mg, 1 mg, Intramuscular, Q8H PRN, Donnie Bautista MD    cloZAPine (CLOZARIL) tablet 25 mg, 25 mg, Oral, HS, Donnie Bautista MD, 25 mg at 10/16/19 2132    cloZAPine (CLOZARIL) tablet 50 mg, 50 mg, Oral, BID, Donnie Bautista MD, 50 mg at 10/16/19 2132    EPINEPHrine PF (ADRENALIN) 1 mg/mL injection 0 15 mg, 0 15 mg, Intramuscular, Once PRN, Donnie Bautista MD    FLUoxetine (PROzac) capsule 20 mg, 20 mg, Oral, Daily, Donnie Bautista MD, 20 mg at 10/17/19 0818    fluticasone-vilanterol (BREO ELLIPTA) 200-25 MCG/INH inhaler 1 puff, 1 puff, Inhalation, Daily, Donnie Bautista MD, 1 puff at 10/17/19 0848    ketotifen (ZADITOR) 0 025 % ophthalmic solution 1 drop, 1 drop, Right Eye, BID PRN, Donnie Bautista MD    levothyroxine tablet 125 mcg, 125 mcg, Oral, Early Morning, Donnie Bautista MD, 125 mcg at 10/17/19 0542    magnesium hydroxide (MILK OF MAGNESIA) 400 mg/5 mL oral suspension 30 mL, 30 mL, Oral, Daily PRN, Sandhya Clark MD    montelukast (SINGULAIR) tablet 10 mg, 10 mg, Oral, HS, Sandhya Clark MD, 10 mg at 10/16/19 2131    OLANZapine (ZyPREXA) IM injection 5 mg, 5 mg, Intramuscular, Q8H PRN, Sandhya Clark MD    OLANZapine (ZyPREXA) tablet 5 mg, 5 mg, Oral, Q8H PRN, Sandhya Clark MD    ondansetron (ZOFRAN-ODT) dispersible tablet 4 mg, 4 mg, Oral, Q6H PRN, Sandhya Clark MD    pantoprazole (PROTONIX) EC tablet 40 mg, 40 mg, Oral, Early Morning, Sandhya Calrk MD, 40 mg at 10/17/19 0542    polyethylene glycol (MIRALAX) packet 17 g, 17 g, Oral, Daily PRN, Sandhya Clark MD    polyvinyl alcohol (LIQUIFILM TEARS) 1 4 % ophthalmic solution 1 drop, 1 drop, Both Eyes, Q3H PRN, Sandhya Clark MD    theophylline (JEF-24) 24 hr capsule 200 mg, 200 mg, Oral, Daily, Sandhya Clark MD, 200 mg at 10/17/19 0818    tiotropium (SPIRIVA) capsule for inhaler 18 mcg, 18 mcg, Inhalation, Daily, Sandhya Clark MD, 18 mcg at 10/17/19 0848    traZODone (DESYREL) tablet 25 mg, 25 mg, Oral, HS PRN, Sandhya Clark MD  Justification if on more than two antipsychotics:  Only on his clozapine  Side effects if any:  None    Risks , benefits, side effects and precautions of medications discussed with patient and reminded patient to let us know any problems with medications     Objective:     Vital Signs:  Vitals:    10/16/19 1500 10/16/19 1900 10/16/19 2154 10/17/19 0730   BP: 107/66 106/68  130/74   BP Location: Right arm Left arm  Left arm   Pulse: 83 89 88 92   Resp: 18 16 18   Temp: (!) 97 3 °F (36 3 °C) 97 7 °F (36 5 °C)  (!) 97 °F (36 1 °C)   TempSrc: Temporal Temporal  Temporal   SpO2: 93% 93% 94%    Weight:       Height:         Appearance:  age appropriate, casually dressed and overweight older than stated age   Behavior:  deviant, evasive and guarded and suspicious but with good eye contact   Speech:  normal pitch and normal volume but tends to become loud at times   Mood:  anxious and dysthymic   Affect: mood-congruent   Thought Process:  goal directed and illogical slightly pressured but able to be redirected and talks as if in a court of low being stilted   Thought Content:  delusions  grandiose and somatic about not being able to breathe despite being on nasal oxygen and paranoid about people out to harm her and Atrovent inhaler tainteded with peanut oil  No current suicidal homicidal thoughts intent or plans reported  No phobias obsessions or compulsions reported   Perceptual Disturbances: None and does not appear responding to internal stimuli   Risk Potential: Tendency to not care for herself if she goes off treatment   Sensorium:  person, place, time/date, situation, day of week, month of year and time   Cognition:  grossly intact   Consciousness:  alert and awake    Attention: Intact concentration and attention span   Intellect: Considered to be at least of average intelligence   Insight:  limited in denial   Judgment: poor      Motor Activity: no abnormal movements         Recent Labs:  Results Reviewed     None          I/O Past 24 hours:  No intake/output data recorded  No intake/output data recorded          Assessment / Plan:     [facility]ffective [facility]      Reason for continued inpatient care:  Because of underlying paranoia and refusal to go back to the personal care home and inability to care for herself on her own  Acceptance by patient:  Accepting  Rene Peacock in recovery:  About living in another personal care home once she feels better  Understanding of medications :  Has some understanding  Involved in reintegration process:  Adjusting to the unit  Trusting in relatoinship with psychiatrist:  Trusting    Recommended Treatment:    Medication changes:  1) no changes today  Non-pharmacological treatments  1) continue with  individual therapy group therapy, milieu therapy and occupational therapy and milieu therapy involving multidisciplinary team approach with psychotherapist, case management, nursing, peer support services, art therapist etc using recovery principles and psycho-education about accepting illness and need for treatment   3) encourage to attend to ADLs like taking showers and wearing clean clothes   4) Encourage to attend groups   Safety  1) Safety/communication plan established targeting dynamic risk factors above  Discharge Plan most likely back to the a:  Personal care boarding home with act services once her delusions are managed and mood becomes more stabilized and she becomes more receptive to treatment compliance    Counseling / Coordination of Care    Total floor / unit time spent today 15 minutes  Greater than 50% of total time was spent with the patient and / or family counseling and / or coordination of care  A description of the counseling / coordination of care  Patient's Rights, confidentiality and exceptions to confidentiality, use of automated medical record, [facility] access to medical record, and consent to treatment reviewed      Greer Beltran MD

## 2019-10-17 NOTE — PLAN OF CARE
Problem: SAFETY ADULT  Goal: Patient will remain free of falls  Description  INTERVENTIONS:  - Assess patient frequently for physical needs  -  Identify cognitive and physical deficits and behaviors that affect risk of falls  -  [place] fall precautions as indicated by assessment   - Educate patient/family on patient safety including physical limitations  - Instruct patient to call for assistance with activity based on assessment  - Modify environment to reduce risk of injury  - Consider OT/PT consult to assist with strengthening/mobility  Outcome: Progressing     Problem: RESPIRATORY - ADULT  Goal: Achieves optimal ventilation and oxygenation  Description  INTERVENTIONS:  - Assess for changes in respiratory status  - Assess for changes in mentation and behavior  - Position to facilitate oxygenation and minimize respiratory effort  - Oxygen administration by appropriate delivery method based on oxygen saturation (per order) or ABGs  - Initiate smoking cessation education as indicated  - Encourage broncho-pulmonary hygiene including cough, deep breathe, Incentive Spirometry  - Assess the need for suctioning and aspirate as needed  - Assess and instruct to report SOB or any respiratory difficulty  - Respiratory Therapy support as indicated  Outcome: Progressing     Problem: SLEEP DISTURBANCE  Goal: Will exhibit normal sleeping pattern  Description  Interventions:  -  Assess the patients sleep pattern, noting recent changes  - Administer medication as ordered  - Decrease environmental stimuli, including noise, as appropriate during the night  - Encourage the patient to actively participate in unit groups and or exercise during the day to enhance ability to achieve adequate sleep at night  - Assess the patient, in the morning, encouraging a description of sleep experience  Outcome: Progressing    Patient in bed, asleep at shift onset, utilizing 1L NC  No s/s of respiratory distress   Presented self at nurses station at [id number] [place] [address] preoccupied with blood sugar after inability to obtain juice as opposed to water  "I need a finger stick"  Patient without history of DM  Does not identify any symptoms of hypoglycemia  Frequently returning self to nurses station with multiple somatic complaints

## 2019-10-17 NOTE — PROGRESS NOTES
Patient declined      10/16/19 1100   Activity/Group Checklist   Group   (IMR/Motivation)   Attendance Did not attend   Attendance Duration (min) 46-60   Affect/Mood IRMA

## 2019-10-17 NOTE — PLAN OF CARE
Problem: Alteration in Thoughts and Perception  Goal: Verbalize thoughts and feelings  Description  Interventions:  - Promote a nonjudgmental and trusting relationship with the patient through active listening and therapeutic communication  - Assess patient's level of functioning, behavior and potential for risk  - Engage patient in 1 on 1 interactions for a minimum of 15 minutes each session  - Encourage patient to express fears, feelings, frustrations, and discuss symptoms    - [place] patient to reality, help patient recognize reality-based thinking   - Administer medications as ordered and assess for potential side effects  - Provide the patient education related to the signs and symptoms of the illness and desired effects of prescribed medications  Outcome: Progressing  Goal: Agree to be compliant with medication regime, as prescribed and report medication side effects  Description  Interventions:  - Offer appropriate PRN medication and supervise ingestion; conduct aims, as needed   Outcome: Progressing     Problem: Anxiety  Goal: Anxiety is at manageable level  Description  Interventions:  - Assess and monitor patient's anxiety level  - Monitor for signs and symptoms of anxiety both physical and emotional (heart palpitations, chest pain, shortness of breath, headaches, nausea, feeling jumpy, restlessness, irritable, apprehensive)  - Collaborate with interdisciplinary team and initiate plan and interventions as ordered    - [place] patient to unit/surroundings  - Explain treatment plan  - Encourage participation in care  - Encourage verbalization of concerns/fears  - Identify coping mechanisms  - Assist in developing anxiety-reducing skills  - Administer/offer alternative therapies  - Limit or eliminate stimulants  Outcome: Progressing     Problem: RESPIRATORY - ADULT  Goal: Achieves optimal ventilation and oxygenation  Description  INTERVENTIONS:  - Assess for changes in respiratory status  - Assess for changes in mentation and behavior  - Position to facilitate oxygenation and minimize respiratory effort  - Oxygen administration by appropriate delivery method based on oxygen saturation (per order) or ABGs  - Initiate smoking cessation education as indicated  - Encourage broncho-pulmonary hygiene including cough, deep breathe, Incentive Spirometry  - Assess the need for suctioning and aspirate as needed  - Assess and instruct to report SOB or any respiratory difficulty  - Respiratory Therapy support as indicated  Outcome: Progressing     Problem: Alteration in Thoughts and Perception  Goal: Attend and participate in unit activities, including therapeutic, recreational, and educational groups  Description  Interventions:  - Provide therapeutic and educational activities daily, encourage attendance and participation, and document same in the medical record     CERTIFIED PEER SPECIALIST INTERVENTIONS:    Complete peer assessment with patient to assess their needs and identify their goals to complete while in the recovery program as well as once discharged into the community  Patient will complete WRAP Plan, Crisis Plan and [employer]  Patient will attend 50% of groups offered on the unit  Patient will complete a goal card weekly  Outcome: Not Progressing  Goal: Complete daily ADLs, including personal hygiene independently, as able  Description  Interventions:  - Observe, teach, and assist patient with ADLS  - Monitor and promote a balance of rest/activity, with adequate nutrition and elimination   Outcome: Not Progressing     Problem: Depression  Goal: Refrain from isolation  Description  Interventions:  - Develop a trusting relationship   - Encourage socialization   Outcome: Not Angeline Palacio has been in her room most of the shift  Likes to either lay or sit in bed  Social with select peers and staff when she does come out   Continues to focus on "swallowing issue " She stated, "I know all my test were okay, but I still have trouble " Took medication with prompting  No difficulty with pills  Ate only 10% of her meal  She drank her juices and ate sherbert  Disheveled appearance  Had a BM this shift, but no shower  Did not attend group this evening  Incentive spirometer once this shift with encouragement  She did fairly well at 1212-1369 ml's  Prompted for evening medication  Oxygen saturation 94% on room air before oxygen 1 liter via nasal cannula applied  Continue to monitor  Precautions maintained

## 2019-10-17 NOTE — PLAN OF CARE
Problem: Alteration in Thoughts and Perception  Goal: Verbalize thoughts and feelings  Description  Interventions:  - Promote a nonjudgmental and trusting relationship with the patient through active listening and therapeutic communication  - Assess patient's level of functioning, behavior and potential for risk  - Engage patient in 1 on 1 interactions for a minimum of 15 minutes each session  - Encourage patient to express fears, feelings, frustrations, and discuss symptoms    - [place] patient to reality, help patient recognize reality-based thinking   - Administer medications as ordered and assess for potential side effects  - Provide the patient education related to the signs and symptoms of the illness and desired effects of prescribed medications  Outcome: Progressing  Goal: Agree to be compliant with medication regime, as prescribed and report medication side effects  Description  Interventions:  - Offer appropriate PRN medication and supervise ingestion; conduct aims, as needed   Outcome: Progressing  Patient visible on unit at start of shift  Upon approach, patient was easy to engage with  Stated she is "okay, Im here " when assessed  Patient ate 100% at breakfast  AM medication compliant  Patient did not attend group activities  Patient observed in bed after breakfast  Will monitor

## 2019-10-17 NOTE — ASSESSMENT & PLAN NOTE
Psychiatry Progress Note    Patient was interviewed today and progress over last 2 weeks discussed with team   She was seen by speech therapy and a swallowing study was done which only showed a had a hernia and no swallowing difficulty  She has never accepting the fact that she is "psychosomatic" and tells me that she is now taking showers every 2 days in the evening shift  She again was laying back on bed and and has not been attending any groups preferring to lay back in bed claiming that she is not feeling well and her group attend was only 8% last week  She continues to verbalize that she does not feel quite safe on the unit and become psychosomatic claiming that she cannot breathe or swallow even though she does appear to breathe fine while observed and swallowing study was negative  She is still  suspicious about certain staff who gives her medications like her inhalers and the spirometer off and on and has complained that the staff has been ordering her around  She was again reminded that  if she improves her group attendance and attends to her ADLs skills,  we can always look into discharging her back to the [facility] home again  She continues to present with stilted speech being too formal as if talking in a court of law which has not changed either  She does not admit to any overt hallucinations or paranoia but still appears to harbor some covert  paranoia based on her demeanor and interaction on the unit  She no longer believes that there is a micro chip on her right forearm and 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  No current suicidal homicidal thoughts intent or plans reported  No overt hallucinations or other delusions reported   No signs or sxs of agranulocytosis or myocarditis or endocarditis and she is moving her bowels so far with no issues  Patient was again reminded to attend to ADLs skills and take showers at least every other day and attend more groups     She has accepted the slight increase in clozapine recently   Current medications:    Current Facility-Administered Medications:     acetaminophen (TYLENOL) tablet 650 mg, 650 mg, Oral, Q6H PRN, Sasha Mendoza MD    albuterol (PROVENTIL HFA,VENTOLIN HFA) inhaler 2 puff, 2 puff, Inhalation, Q4H PRN, Sasha Mendoza MD, 2 puff at 10/11/19 0424    aluminum-magnesium hydroxide-simethicone (MYLANTA) 200-200-20 mg/5 mL oral suspension 15 mL, 15 mL, Oral, Q4H PRN, Sasha Mendoza MD    ammonium lactate (LAC-HYDRIN) 12 % lotion 1 application, 1 application, Topical, BID PRN, Sasha Mendoza MD    benzonatate (TESSALON PERLES) capsule 100 mg, 100 mg, Oral, TID PRN, Sasha Mendoza MD    benztropine (COGENTIN) injection 1 mg, 1 mg, Intramuscular, Q8H PRN, Sasha Mendoza MD    cloZAPine (CLOZARIL) tablet 25 mg, 25 mg, Oral, HS, Sasha Mendoza MD, 25 mg at 10/16/19 2132    cloZAPine (CLOZARIL) tablet 50 mg, 50 mg, Oral, BID, Sasha Mendoza MD, 50 mg at 10/16/19 2132    EPINEPHrine PF (ADRENALIN) 1 mg/mL injection 0 15 mg, 0 15 mg, Intramuscular, Once PRN, Sasha Mendoza MD    FLUoxetine (PROzac) capsule 20 mg, 20 mg, Oral, Daily, Sasha Mendoza MD, 20 mg at 10/17/19 0818    fluticasone-vilanterol (BREO ELLIPTA) 200-25 MCG/INH inhaler 1 puff, 1 puff, Inhalation, Daily, Sasha Mendoza MD, 1 puff at 10/17/19 0848    ketotifen (ZADITOR) 0 025 % ophthalmic solution 1 drop, 1 drop, Right Eye, BID PRN, Sasha Mendoza MD    levothyroxine tablet 125 mcg, 125 mcg, Oral, Early Morning, Sasha Mendoza MD, 125 mcg at 10/17/19 0542    magnesium hydroxide (MILK OF MAGNESIA) 400 mg/5 mL oral suspension 30 mL, 30 mL, Oral, Daily PRN, Sasha Mendoza MD    montelukast (SINGULAIR) tablet 10 mg, 10 mg, Oral, HS, Sasha Mendoza MD, 10 mg at 10/16/19 2131    OLANZapine (ZyPREXA) IM injection 5 mg, 5 mg, Intramuscular, Q8H PRN, Sasha Mendoza MD    OLANZapine (ZyPREXA) tablet 5 mg, 5 mg, Oral, Q8H PRN, MD Marcello Reyes ondansetron (ZOFRAN-ODT) dispersible tablet 4 mg, 4 mg, Oral, Q6H PRN, Tae Jasso MD    pantoprazole (PROTONIX) EC tablet 40 mg, 40 mg, Oral, Early Morning, Tae Jasso MD, 40 mg at 10/17/19 0542    polyethylene glycol (MIRALAX) packet 17 g, 17 g, Oral, Daily PRN, Tae Jasso MD    polyvinyl alcohol (LIQUIFILM TEARS) 1 4 % ophthalmic solution 1 drop, 1 drop, Both Eyes, Q3H PRN, Tae Jasso MD    theophylline (JEF-24) 24 hr capsule 200 mg, 200 mg, Oral, Daily, Tae Jasso MD, 200 mg at 10/17/19 0818    tiotropium (SPIRIVA) capsule for inhaler 18 mcg, 18 mcg, Inhalation, Daily, Tae Jasso MD, 18 mcg at 10/17/19 0848    traZODone (DESYREL) tablet 25 mg, 25 mg, Oral, HS PRN, Tae Jasso MD  Justification if on more than two antipsychotics:  Only on his clozapine  Side effects if any:  None    Risks , benefits, side effects and precautions of medications discussed with patient and reminded patient to let us know any problems with medications     Objective:     Vital Signs:  Vitals:    10/16/19 1500 10/16/19 1900 10/16/19 2154 10/17/19 0730   BP: 107/66 106/68  130/74   BP Location: Right arm Left arm  Left arm   Pulse: 83 89 88 92   Resp: 18 16  18   Temp: (!) 97 3 °F (36 3 °C) 97 7 °F (36 5 °C)  (!) 97 °F (36 1 °C)   TempSrc: Temporal Temporal  Temporal   SpO2: 93% 93% 94%    Weight:       Height:         Appearance:  age appropriate, casually dressed and overweight older than stated age   Behavior:  deviant, evasive and guarded and suspicious but with good eye contact   Speech:  normal pitch and normal volume but tends to become loud at times   Mood:  anxious and dysthymic   Affect:  mood-congruent   Thought Process:  goal directed and illogical slightly pressured but able to be redirected and talks as if in a court of low being stilted   Thought Content:  delusions  grandiose and somatic about not being able to breathe despite being on nasal oxygen and paranoid about people out to harm her and Atrovent inhaler tainteded with peanut oil  No current suicidal homicidal thoughts intent or plans reported  No phobias obsessions or compulsions reported   Perceptual Disturbances: None and does not appear responding to internal stimuli   Risk Potential: Tendency to not care for herself if she goes off treatment   Sensorium:  person, place, time/date, situation, day of week, month of year and time   Cognition:  grossly intact   Consciousness:  alert and awake    Attention: Intact concentration and attention span   Intellect: Considered to be at least of average intelligence   Insight:  limited in denial   Judgment: poor      Motor Activity: no abnormal movements         Recent Labs:  Results Reviewed     None          I/O Past 24 hours:  No intake/output data recorded  No intake/output data recorded  Assessment / Plan:     Schizoaffective disorder, bipolar type ([facility] [place] [facility] )      Reason for continued inpatient care:  Because of underlying paranoia and refusal to go back to the personal care home and inability to care for herself on her own  Acceptance by patient:  Accepting  Quinn Velásquez in recovery:  About living in another personal care home once she feels better  Understanding of medications :  Has some understanding  Involved in reintegration process:  Adjusting to the unit  Trusting in relatoinship with psychiatrist:  Trusting    Recommended Treatment:    Medication changes:  1) no changes today  Non-pharmacological treatments  1) continue with  individual therapy group therapy, milieu therapy and occupational therapy and milieu therapy involving multidisciplinary team approach with psychotherapist, case management, nursing, peer support services, art therapist etc using recovery principles and psycho-education about accepting illness and need for treatment     3) encourage to attend to ADLs like taking showers and wearing clean clothes   4) Encourage to attend groups   Safety  1) Safety/communication plan established targeting dynamic risk factors above  Discharge Plan most likely back to the a:  Personal care boarding home with act services once her delusions are managed and mood becomes more stabilized and she becomes more receptive to treatment compliance    Counseling / Coordination of Care    Total floor / unit time spent today 15 minutes  Greater than 50% of total time was spent with the patient and / or family counseling and / or coordination of care  A description of the counseling / coordination of care  Patient's Rights, confidentiality and exceptions to confidentiality, use of automated medical record, [facility] to [facility] record, and consent to treatment reviewed      Krystyna Mcclain MD

## 2019-10-17 NOTE — PROGRESS NOTES
10/17/19 0900   Team Meeting   Meeting Type Daily Rounds   Team Members Present   Team Members Present Physician;Nurse;; Other (Discipline and Name)   Physician Team Member Dr Birgit Castro Team Member Kami Rain RN   Care Management Team Member Brenda Green   Other (Discipline and Name) Alice Tai, SHANIKA     Patient has been somatic and isolative  Not taking direction from staff  Not attending groups  Preoccupied with blood sugar this morning  Slept

## 2019-10-18 PROCEDURE — 99231 SBSQ HOSP IP/OBS SF/LOW 25: CPT | Performed by: PSYCHIATRY & NEUROLOGY

## 2019-10-18 RX ADMIN — TIOTROPIUM BROMIDE 18 MCG: 18 CAPSULE ORAL; RESPIRATORY (INHALATION) at 08:54

## 2019-10-18 RX ADMIN — THEOPHYLLINE ANHYDROUS 200 MG: 200 CAPSULE, EXTENDED RELEASE ORAL at 08:54

## 2019-10-18 RX ADMIN — CLOZAPINE 25 MG: 25 TABLET ORAL at 21:23

## 2019-10-18 RX ADMIN — FLUOXETINE 20 MG: 20 CAPSULE ORAL at 08:54

## 2019-10-18 RX ADMIN — MONTELUKAST SODIUM 10 MG: 10 TABLET, COATED ORAL at 21:23

## 2019-10-18 RX ADMIN — PANTOPRAZOLE SODIUM 40 MG: 40 TABLET, DELAYED RELEASE ORAL at 05:48

## 2019-10-18 RX ADMIN — LEVOTHYROXINE SODIUM 125 MCG: 125 TABLET ORAL at 05:48

## 2019-10-18 RX ADMIN — CLOZAPINE 50 MG: 25 TABLET ORAL at 21:23

## 2019-10-18 RX ADMIN — CLOZAPINE 50 MG: 25 TABLET ORAL at 17:08

## 2019-10-18 RX ADMIN — FLUTICASONE FUROATE AND VILANTEROL TRIFENATATE 1 PUFF: 200; 25 POWDER RESPIRATORY (INHALATION) at 08:55

## 2019-10-18 NOTE — PLAN OF CARE
Problem: Alteration in Thoughts and Perception  Goal: Verbalize thoughts and feelings  Description  Interventions:  - Promote a nonjudgmental and trusting relationship with the patient through active listening and therapeutic communication  - Assess patient's level of functioning, behavior and potential for risk  - Engage patient in 1 on 1 interactions for a minimum of 15 minutes each session  - Encourage patient to express fears, feelings, frustrations, and discuss symptoms    - [place] patient to reality, help patient recognize reality-based thinking   - Administer medications as ordered and assess for potential side effects  - Provide the patient education related to the signs and symptoms of the illness and desired effects of prescribed medications  Outcome: Progressing  Goal: Agree to be compliant with medication regime, as prescribed and report medication side effects  Description  Interventions:  - Offer appropriate PRN medication and supervise ingestion; conduct aims, as needed   Outcome: Progressing  Goal: Attend and participate in unit activities, including therapeutic, recreational, and educational groups  Description  Interventions:  - Provide therapeutic and educational activities daily, encourage attendance and participation, and document same in the medical record     CERTIFIED PEER SPECIALIST INTERVENTIONS:    Complete peer assessment with patient to assess their needs and identify their goals to complete while in the recovery program as well as once discharged into the community  Patient will complete WRAP Plan, Crisis Plan and [employer]  Patient will attend 50% of groups offered on the unit  Patient will complete a goal card weekly      Outcome: Progressing     Problem: Ineffective Coping  Goal: Participates in unit activities  Description  Interventions:  - Provide therapeutic environment   - Provide required programming   - Redirect inappropriate behaviors   Outcome: Progressing  Goal: Patient/Family verbalizes awareness of resources  Outcome: Progressing  Goal: Understands least restrictive measures  Description  Interventions:  - Utilize least restrictive behavior  Outcome: Progressing     Problem: Risk for Self Injury/Neglect  Goal: Verbalize thoughts and feelings  Description  Interventions:  - Assess and re-assess patient's lethality and potential for self-injury  - Engage patient in 1:1 interactions, daily, for a minimum of 15 minutes  - Encourage patient to express feelings, fears, frustrations, hopes  - Establish rapport/trust with patient   Outcome: Progressing  Goal: Refrain from harming self  Description  Interventions:  - Monitor patient closely, per order  - Develop a trusting relationship  - Supervise medication ingestion, monitor effects and side effects   Outcome: Progressing  Goal: Attend and participate in unit activities, including therapeutic, recreational, and educational groups  Description  Interventions:  - Provide therapeutic and educational activities daily, encourage attendance and participation, and document same in the medical record  - Obtain collateral information, encourage visitation and family involvement in care   Outcome: Progressing     Problem: Depression  Goal: Verbalize thoughts and feelings  Description  Interventions:  - Assess and re-assess patient's level of risk   - Engage patient in 1:1 interactions, daily, for a minimum of 15 minutes   - Encourage patient to express feelings, fears, frustrations, hopes   Outcome: Progressing     Problem: Anxiety  Goal: Anxiety is at manageable level  Description  Interventions:  - Assess and monitor patient's anxiety level  - Monitor for signs and symptoms of anxiety both physical and emotional (heart palpitations, chest pain, shortness of breath, headaches, nausea, feeling jumpy, restlessness, irritable, apprehensive)     - Collaborate with interdisciplinary team and initiate plan and interventions as ordered  - [place] patient to unit/surroundings  - Explain treatment plan  - Encourage participation in care  - Encourage verbalization of concerns/fears  - Identify coping mechanisms  - Assist in developing anxiety-reducing skills  - Administer/offer alternative therapies  - Limit or eliminate stimulants  Outcome: Progressing     Problem: Alteration in Orientation  Goal: Interact with staff daily  Description  Interventions:  - Assess and re-assess patient's level of orientation  - Engage patient in 1 on 1 interactions, daily, for a minimum of 15 minutes   - Establish rapport/trust with patient   Outcome: Progressing     Problem: PAIN - ADULT  Goal: Verbalizes/displays adequate comfort level or baseline comfort level  Description  Interventions:  - Encourage patient to monitor pain and request assistance  - Assess pain using appropriate pain scale  - Administer analgesics based on type and severity of pain and evaluate response  - Implement non-pharmacological measures as appropriate and evaluate response  - Consider cultural and social influences on pain and pain management  - Notify physician/advanced practitioner if interventions unsuccessful or patient reports new pain  Outcome: Progressing     Problem: SAFETY ADULT  Goal: Patient will remain free of falls  Description  INTERVENTIONS:  - Assess patient frequently for physical needs  -  Identify cognitive and physical deficits and behaviors that affect risk of falls    -  [place] fall precautions as indicated by assessment   - Educate patient/family on patient safety including physical limitations  - Instruct patient to call for assistance with activity based on assessment  - Modify environment to reduce risk of injury  - Consider OT/PT consult to assist with strengthening/mobility  Outcome: Progressing     Problem: RESPIRATORY - ADULT  Goal: Achieves optimal ventilation and oxygenation  Description  INTERVENTIONS:  - Assess for changes in respiratory status  - Assess for changes in mentation and behavior  - Position to facilitate oxygenation and minimize respiratory effort  - Oxygen administration by appropriate delivery method based on oxygen saturation (per order) or ABGs  - Initiate smoking cessation education as indicated  - Encourage broncho-pulmonary hygiene including cough, deep breathe, Incentive Spirometry  - Assess the need for suctioning and aspirate as needed  - Assess and instruct to report SOB or any respiratory difficulty  - Respiratory Therapy support as indicated  Outcome: Progressing     Problem: Nutrition/Hydration-ADULT  Goal: Nutrient/Hydration intake appropriate for improving, restoring or maintaining nutritional needs  Description  Monitor and assess patient's nutrition/hydration status for malnutrition  Collaborate with interdisciplinary team and initiate plan and interventions as ordered  Monitor patient's weight and dietary intake as ordered or per policy  Utilize nutrition screening tool and intervene as necessary  Determine patient's food preferences and provide high-protein, high-caloric foods as appropriate       INTERVENTIONS:  - Monitor oral intake, urinary output, labs, and treatment plans  - Assess nutrition and hydration status and recommend course of action  - Evaluate amount of meals eaten  - Assist patient with eating if necessary   - Allow adequate time for meals  - Recommend/ encourage appropriate diets, oral nutritional supplements, and vitamin/mineral supplements  - Order, calculate, and assess calorie counts as needed  - Recommend, monitor, and adjust tube feedings and TPN/PPN based on assessed needs  - Assess need for intravenous fluids  - Provide specific nutrition/hydration education as appropriate  - Include patient/family/caregiver in decisions related to nutrition  Outcome: Progressing     Problem: Alteration in Thoughts and Perception  Goal: Complete daily ADLs, including personal hygiene independently, as able  Description  Interventions:  - Observe, teach, and assist patient with ADLS  - Monitor and promote a balance of rest/activity, with adequate nutrition and elimination   Outcome: Not Progressing     Problem: Risk for Self Injury/Neglect  Goal: Complete daily ADLs, including personal hygiene independently, as able  Description  Interventions:  - Observe, teach, and assist patient with ADLS  - Monitor and promote a balance of rest/activity, with adequate nutrition and elimination  Outcome: Not Progressing     Problem: Depression  Goal: Refrain from isolation  Description  Interventions:  - Develop a trusting relationship   - Encourage socialization   Outcome: Not Progressing  Goal: Refrain from self-neglect  Outcome: Not Progressing   The patient has been mostly isolative to her room lying in her bed  No interaction with her peers noted, but interacts with staff  No somatic complaints voiced today  Attended fresh air and medication groups  Ate 100% breakfast and 25% of lunch  Prompted to shower but did not  Med compliant without prompting  Continue to monitor

## 2019-10-18 NOTE — PLAN OF CARE
Problem: Alteration in Thoughts and Perception  Goal: Treatment Goal: Gain control of psychotic behaviors/thinking, reduce/eliminate presenting symptoms and demonstrate improved reality functioning upon discharge  Outcome: Not Progressing  Goal: Verbalize thoughts and feelings  Description  Interventions:  - Promote a nonjudgmental and trusting relationship with the patient through active listening and therapeutic communication  - Assess patient's level of functioning, behavior and potential for risk  - Engage patient in 1 on 1 interactions for a minimum of 15 minutes each session  - Encourage patient to express fears, feelings, frustrations, and discuss symptoms    - Ethel patient to reality, help patient recognize reality-based thinking   - Administer medications as ordered and assess for potential side effects  - Provide the patient education related to the signs and symptoms of the illness and desired effects of prescribed medications  Outcome: Progressing  Goal: Agree to be compliant with medication regime, as prescribed and report medication side effects  Description  Interventions:  - Offer appropriate PRN medication and supervise ingestion; conduct aims, as needed   Outcome: Progressing  Goal: Attend and participate in unit activities, including therapeutic, recreational, and educational groups  Description  Interventions:  - Provide therapeutic and educational activities daily, encourage attendance and participation, and document same in the medical record     CERTIFIED PEER SPECIALIST INTERVENTIONS:    Complete peer assessment with patient to assess their needs and identify their goals to complete while in the recovery program as well as once discharged into the community  Patient will complete WRAP Plan, Crisis Plan and 5 Life Domains  Patient will attend 50% of groups offered on the unit  Patient will complete a goal card weekly      Outcome: Progressing  Goal: Recognize dysfunctional thoughts, communicate reality-based thoughts at the time of discharge  Description  Interventions:  - Provide medication and psycho-education to assist patient in compliance and developing insight into his/her illness   Outcome: Not Progressing  Goal: Complete daily ADLs, including personal hygiene independently, as able  Description  Interventions:  - Observe, teach, and assist patient with ADLS  - Monitor and promote a balance of rest/activity, with adequate nutrition and elimination   Outcome: Not Progressing     Problem: Ineffective Coping  Goal: Participates in unit activities  Description  Interventions:  - Provide therapeutic environment   - Provide required programming   - Redirect inappropriate behaviors   Outcome: Progressing     Problem: Risk for Self Injury/Neglect  Goal: Attend and participate in unit activities, including therapeutic, recreational, and educational groups  Description  Interventions:  - Provide therapeutic and educational activities daily, encourage attendance and participation, and document same in the medical record  - Obtain collateral information, encourage visitation and family involvement in care   Outcome: Progressing     Problem: Alteration in Orientation  Goal: Interact with staff daily  Description  Interventions:  - Assess and re-assess patient's level of orientation  - Engage patient in 1 on 1 interactions, daily, for a minimum of 15 minutes   - Establish rapport/trust with patient   Outcome: Progressing     Problem: PAIN - ADULT  Goal: Verbalizes/displays adequate comfort level or baseline comfort level  Description  Interventions:  - Encourage patient to monitor pain and request assistance  - Assess pain using appropriate pain scale  - Administer analgesics based on type and severity of pain and evaluate response  - Implement non-pharmacological measures as appropriate and evaluate response  - Consider cultural and social influences on pain and pain management  - Notify physician/advanced practitioner if interventions unsuccessful or patient reports new pain  Outcome: Progressing     Problem: SAFETY ADULT  Goal: Patient will remain free of falls  Description  INTERVENTIONS:  - Assess patient frequently for physical needs  -  Identify cognitive and physical deficits and behaviors that affect risk of falls  -  [place] fall precautions as indicated by assessment   - Educate patient/family on patient safety including physical limitations  - Instruct patient to call for assistance with activity based on assessment  - Modify environment to reduce risk of injury  - Consider OT/PT consult to assist with strengthening/mobility  Outcome: Progressing     Problem: RESPIRATORY - ADULT  Goal: Achieves optimal ventilation and oxygenation  Description  INTERVENTIONS:  - Assess for changes in respiratory status  - Assess for changes in mentation and behavior  - Position to facilitate oxygenation and minimize respiratory effort  - Oxygen administration by appropriate delivery method based on oxygen saturation (per order) or ABGs  - Initiate smoking cessation education as indicated  - Encourage broncho-pulmonary hygiene including cough, deep breathe, Incentive Spirometry  - Assess the need for suctioning and aspirate as needed  - Assess and instruct to report SOB or any respiratory difficulty  - Respiratory Therapy support as indicated  Outcome: Progressing     Problem: Nutrition/Hydration-ADULT  Goal: Nutrient/Hydration intake appropriate for improving, restoring or maintaining nutritional needs  Description  Monitor and assess patient's nutrition/hydration status for malnutrition  Collaborate with interdisciplinary team and initiate plan and interventions as ordered  Monitor patient's weight and dietary intake as ordered or per policy  Utilize nutrition screening tool and intervene as necessary  Determine patient's food preferences and provide high-protein, high-caloric foods as appropriate  INTERVENTIONS:  - Monitor oral intake, urinary output, labs, and treatment plans  - Assess nutrition and hydration status and recommend course of action  - Evaluate amount of meals eaten  - Assist patient with eating if necessary   - Allow adequate time for meals  - Recommend/ encourage appropriate diets, oral nutritional supplements, and vitamin/mineral supplements  - Order, calculate, and assess calorie counts as needed  - Recommend, monitor, and adjust tube feedings and TPN/PPN based on assessed needs  - Assess need for intravenous fluids  - Provide specific nutrition/hydration education as appropriate  - Include patient/family/caregiver in decisions related to nutrition  Outcome: Not Buck Ards is awake and alert at the onset of the shift  Visible, pleasant upon approach  Attended and participated in evening group  Little interaction with her peers  She remain somatic about her been hypoglycemic  She is persistently asking staff to take her blood sugar throughout the shift for her delusional non existent hypoglycemia  During her  Visitation with her sister, she was calm, pleasant,  and incongruent with the way she stated how bad she's been feeling all day  This writer brought her a chocolate glucerma drink per order and educated her that the drink will sustained her blood sugar thru the night, which she drank  Per MHT Tu Bautista had for snack  2 yogurt and ice cream   Denies any depression or anxiety  Remain delusion about her blood sugar  No a/t/v hallucination noted  Maintained on ongoing SAFE precaution without incident on this shift  Prior to bed [email or web address]  1L O2 with humidifier via nasal cannula at bedtime  Will continue to monitor

## 2019-10-18 NOTE — PROGRESS NOTES
Genia Phalen from [facility] called back informing writer that she spoke with their billing department  She has a call in to medical assistance to explain the situation  Genia Phalen indicated that medical assistance will be calling her back early next week to discuss covering the cost of the oxygen unit  Writer will continue to follow up

## 2019-10-18 NOTE — PROGRESS NOTES
Patient not scheduled to attend      10/16/19 1400   Activity/Group Checklist   Group   ([facility] )   Attendance Did not attend   Attendance Duration (min) 46-60   Affect/Mood IRMA

## 2019-10-18 NOTE — PROGRESS NOTES
Writer spoke with the [facility]  to clarify why the portable O2 unit was retrieved and returned  It was explained that she is in a long term program that focuses on community reintegration therefore requiring a portable unit  It was reported to writer that the company discontinued services because she was transferred to hospice/ nursing home  Writer again explained the need for a portable unit  Georges Dunn will be contacting their billing department to settle the issues and resupply individual with a portable unit

## 2019-10-18 NOTE — PROGRESS NOTES
Progress Note - Selina Simon 1955, 60 y o  female MRN: [id number]    [facility] Encounter: [id number]    Primary Care Provider: Erma Morillo MD   Date and time admitted to hospital: 7/23/2019  5:30 PM        * Schizoaffective disorder, bipolar type [facility])  Assessment & Plan  Psychiatry Progress Note    Patient was found again laying back on bed this morning when approached but claims that she did attend 2 groups yesterday and plans to attend 2 more groups today  She did not take a shower yesterday and claims that she will later today  She continues to verbalize that she does not feel quite safe on the unit and become psychosomatic claiming that she cannot breathe or swallow even though she does appear to breathe fine while observed and swallowing study was negative  She is still  suspicious about certain staff who gives her medications like her inhalers and the spirometer off and on    She was again reminded that  if she improves her group attendance and attends to her ADLs skills,  we can always look [facility]  She continues to present with stilted speech being too formal as if talking in a court of law  She does not admit to any overt hallucinations or paranoia but still appears to harbor some covert  paranoia based on her demeanor and interaction on the unit  She no longer believes that there is a micro chip on her right forearm and 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  No current suicidal homicidal thoughts intent or plans reported  No overt hallucinations or other delusions reported   No signs or sxs of agranulocytosis or myocarditis or endocarditis and she is moving her bowels so far with no issues  Patient was again reminded to attend to ADLs skills and take showers at least every other day and attend more groups        Current medications:    Current Facility-Administered Medications:     acetaminophen (TYLENOL) tablet 650 mg, 650 mg, Oral, Q6H PRN, Alirio Frazier MD    albuterol (PROVENTIL HFA,VENTOLIN HFA) inhaler 2 puff, 2 puff, Inhalation, Q4H PRN, Alirio Frazier MD, 2 puff at 10/11/19 0424    aluminum-magnesium hydroxide-simethicone (MYLANTA) 200-200-20 mg/5 mL oral suspension 15 mL, 15 mL, Oral, Q4H PRN, Alirio Frazier MD    ammonium lactate (LAC-HYDRIN) 12 % lotion 1 application, 1 application, Topical, BID PRN, Alirio Frazier MD    benzonatate (TESSALON PERLES) capsule 100 mg, 100 mg, Oral, TID PRN, Alirio Frazier MD    benztropine (COGENTIN) injection 1 mg, 1 mg, Intramuscular, Q8H PRN, Alirio Frazier MD    cloZAPine (CLOZARIL) tablet 25 mg, 25 mg, Oral, HS, Alirio Frazier MD, 25 mg at 10/17/19 2114    cloZAPine (CLOZARIL) tablet 50 mg, 50 mg, Oral, BID, Alirio Frazier MD, 50 mg at 10/17/19 2115    EPINEPHrine PF (ADRENALIN) 1 mg/mL injection 0 15 mg, 0 15 mg, Intramuscular, Once PRN, Alirio Frazier MD    FLUoxetine (PROzac) capsule 20 mg, 20 mg, Oral, Daily, Alirio Frazier MD, 20 mg at 10/18/19 0854    fluticasone-vilanterol (BREO ELLIPTA) 200-25 MCG/INH inhaler 1 puff, 1 puff, Inhalation, Daily, Alirio Frazier MD, 1 puff at 10/18/19 0855    ketotifen (ZADITOR) 0 025 % ophthalmic solution 1 drop, 1 drop, Right Eye, BID PRN, Alirio Frazier MD    levothyroxine tablet 125 mcg, 125 mcg, Oral, Early Morning, Alirio Frazier MD, 125 mcg at 10/18/19 0548    magnesium hydroxide (MILK OF MAGNESIA) 400 mg/5 mL oral suspension 30 mL, 30 mL, Oral, Daily PRN, Alirio Frazier MD    montelukast (SINGULAIR) tablet 10 mg, 10 mg, Oral, HS, Alirio Frazier MD, 10 mg at 10/17/19 8782    OLANZapine (ZyPREXA) IM injection 5 mg, 5 mg, Intramuscular, Q8H PRN, Alirio Frazier MD    OLANZapine (ZyPREXA) tablet 5 mg, 5 mg, Oral, Q8H PRN, Alirio Frazier MD    ondansetron (ZOFRAN-ODT) dispersible tablet 4 mg, 4 mg, Oral, Q6H PRN, Alirio Frazier MD    pantoprazole (PROTONIX) EC tablet 40 mg, 40 mg, Oral, Early Morning, Alirio Frazier MD, 40 mg at 10/18/19 5415   polyethylene glycol (MIRALAX) packet 17 g, 17 g, Oral, Daily PRN, Shilpa Trujillo MD    polyvinyl alcohol (LIQUIFILM TEARS) 1 4 % ophthalmic solution 1 drop, 1 drop, Both Eyes, Q3H PRN, Shilpa Trujillo MD    theophylline (JEF-24) 24 hr capsule 200 mg, 200 mg, Oral, Daily, Shilpa Trujillo MD, 200 mg at 10/18/19 0854    tiotropium (SPIRIVA) capsule for inhaler 18 mcg, 18 mcg, Inhalation, Daily, Shilpa Trujillo MD, 18 mcg at 10/18/19 0854    traZODone (DESYREL) tablet 25 mg, 25 mg, Oral, HS PRN, Shilpa Trujillo MD  Justification if on more than two antipsychotics:  Only on his clozapine  Side effects if any:  None    Risks , benefits, side effects and precautions of medications discussed with patient and reminded patient to let us know any problems with medications     Objective:     Vital Signs:  Vitals:    10/17/19 1610 10/17/19 1900 10/17/19 2151 10/18/19 0730   BP: 116/78 116/70  100/64   BP Location: Left arm Left arm  Left arm   Pulse: 80 90  89   Resp: 18 17  18   Temp: 98 2 °F (36 8 °C) 97 8 °F (36 6 °C)  98 2 °F (36 8 °C)   TempSrc: Temporal Temporal  Temporal   SpO2: 98% 93% 98%    Weight:       Height:         Appearance:  age appropriate, casually dressed and overweight older than stated age   Behavior:  deviant, evasive and guarded and suspicious but with good eye contact   Speech:  normal pitch and normal volume but tends to become loud at times   Mood:  anxious and dysthymic   Affect:  mood-congruent   Thought Process:  goal directed and illogical slightly pressured but able to be redirected and talks as if in a court of low being stilted   Thought Content:  delusions  grandiose and somatic about not being able to breathe despite being on nasal oxygen and paranoid about people out to harm her and Atrovent inhaler tainteded with peanut oil  No current suicidal homicidal thoughts intent or plans reported    No phobias obsessions or compulsions reported   Perceptual Disturbances: None and does not appear responding to internal stimuli   Risk Potential: Tendency to not care for herself if she goes off treatment   Sensorium:  person, place, time/date, situation, day of week, month of year and time   Cognition:  grossly intact   Consciousness:  alert and awake    Attention: Intact concentration and attention span   Intellect: Considered to be at least of average intelligence   Insight:  limited in denial   Judgment: poor      Motor Activity: no abnormal movements         Recent Labs:  Results Reviewed     None          I/O Past 24 hours:  No intake/output data recorded  No intake/output data recorded  Assessment / Plan:     Schizoaffective disorder, bipolar type ([facility] [address] )      Reason for continued inpatient care:  Because of underlying paranoia and refusal to go back to the personal care home and inability to care for herself on her own  Acceptance by patient:  Accepting  Aldrich Screws in recovery:  About living in another personal care home once she feels better  Understanding of medications :  Has some understanding  Involved in reintegration process:  Adjusting to the unit  Trusting in relatoinship with psychiatrist:  Trusting    Recommended Treatment:    Medication changes:  1) no changes today  Non-pharmacological treatments  1) continue with  individual therapy group therapy, milieu therapy and occupational therapy and milieu therapy involving multidisciplinary team approach with psychotherapist, case management, nursing, peer support services, art therapist etc using recovery principles and psycho-education about accepting illness and need for treatment   3) encourage to attend to ADLs like taking showers and wearing clean clothes   4) Encourage to attend groups   Safety  1) Safety/communication plan established targeting dynamic risk factors above    Discharge Plan most likely back to the a:  Personal care boarding home with [facility] services once her delusions are managed and mood becomes more stabilized and she becomes more receptive to treatment compliance    Counseling / Coordination of Care    Total floor / unit time spent today 15 minutes  Greater than 50% of total time was spent with the patient and / or family counseling and / or coordination of care  A description of the counseling / coordination of care  Patient's Rights, confidentiality and exceptions to confidentiality, use of automated medical record, [facility] staff access to medical record, and consent to treatment reviewed      Dalton Garner MD

## 2019-10-18 NOTE — ASSESSMENT & PLAN NOTE
Psychiatry Progress Note    Patient was found again laying back on bed this morning when approached but claims that she did attend 2 groups yesterday and plans to attend 2 more groups today  She did not take a shower yesterday and claims that she will later today  She continues to verbalize that she does not feel quite safe on the unit and become psychosomatic claiming that she cannot breathe or swallow even though she does appear to breathe fine while observed and swallowing study was negative  She is still  suspicious about certain staff who gives her medications like her inhalers and the spirometer off and on    She was again reminded that  if she improves her group attendance and attends to her ADLs skills,  we can always look into discharging her back to the [facility] home again  She continues to present with stilted speech being too formal as if talking in a court of law  She does not admit to any overt hallucinations or paranoia but still appears to harbor some covert  paranoia based on her demeanor and interaction on the unit  She no longer believes that there is a micro chip on her right forearm and 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  No current suicidal homicidal thoughts intent or plans reported  No overt hallucinations or other delusions reported   No signs or sxs of agranulocytosis or myocarditis or endocarditis and she is moving her bowels so far with no issues  Patient was again reminded to attend to ADLs skills and take showers at least every other day and attend more groups        Current medications:    Current Facility-Administered Medications:     acetaminophen (TYLENOL) tablet 650 mg, 650 mg, Oral, Q6H PRN, Jeffrey Gallegos MD    albuterol (PROVENTIL HFA,VENTOLIN HFA) inhaler 2 puff, 2 puff, Inhalation, Q4H PRN, Jeffrey Gallegos MD, 2 puff at 10/11/19 0424    aluminum-magnesium hydroxide-simethicone (MYLANTA) 200-200-20 mg/5 mL oral suspension 15 mL, 15 mL, Oral, Q4H PRN, Felisa Florence MD    ammonium lactate (LAC-HYDRIN) 12 % lotion 1 application, 1 application, Topical, BID PRN, Felisa Florence MD    benzonatate (TESSALON PERLES) capsule 100 mg, 100 mg, Oral, TID PRN, Felisa Florence MD    benztropine (COGENTIN) injection 1 mg, 1 mg, Intramuscular, Q8H PRN, Felisa Florence MD    cloZAPine (CLOZARIL) tablet 25 mg, 25 mg, Oral, HS, Felisa Florence MD, 25 mg at 10/17/19 2114    cloZAPine (CLOZARIL) tablet 50 mg, 50 mg, Oral, BID, Felisa Florence MD, 50 mg at 10/17/19 2115    EPINEPHrine PF (ADRENALIN) 1 mg/mL injection 0 15 mg, 0 15 mg, Intramuscular, Once PRN, Felisa Florence MD    FLUoxetine (PROzac) capsule 20 mg, 20 mg, Oral, Daily, Felisa Florence MD, 20 mg at 10/18/19 0854    fluticasone-vilanterol (BREO ELLIPTA) 200-25 MCG/INH inhaler 1 puff, 1 puff, Inhalation, Daily, Felisa Florence MD, 1 puff at 10/18/19 0855    ketotifen (ZADITOR) 0 025 % ophthalmic solution 1 drop, 1 drop, Right Eye, BID PRN, Felisa Florence MD    levothyroxine tablet 125 mcg, 125 mcg, Oral, Early Morning, Felisa Florence MD, 125 mcg at 10/18/19 0548    magnesium hydroxide (MILK OF MAGNESIA) 400 mg/5 mL oral suspension 30 mL, 30 mL, Oral, Daily PRN, Felisa Florence MD    montelukast (SINGULAIR) tablet 10 mg, 10 mg, Oral, HS, Felisa Florence MD, 10 mg at 10/17/19 2115    OLANZapine (ZyPREXA) IM injection 5 mg, 5 mg, Intramuscular, Q8H PRN, Felisa Florence MD    OLANZapine (ZyPREXA) tablet 5 mg, 5 mg, Oral, Q8H PRN, Felisa Florence MD    ondansetron (ZOFRAN-ODT) dispersible tablet 4 mg, 4 mg, Oral, Q6H PRN, Felisa Florence MD    pantoprazole (PROTONIX) EC tablet 40 mg, 40 mg, Oral, Early Morning, Felisa Florence MD, 40 mg at 10/18/19 0548    polyethylene glycol (MIRALAX) packet 17 g, 17 g, Oral, Daily PRN, Felisa Florence MD    polyvinyl alcohol (LIQUIFILM TEARS) 1 4 % ophthalmic solution 1 drop, 1 drop, Both Eyes, Q3H PRN, Felisa Florence MD    theophylline (JEF-24) 24 hr capsule 200 mg, 200 mg, Oral, Daily, Felisa Florence MD, 200 mg at 10/18/19 0854    tiotropium (SPIRIVA) capsule for inhaler 18 mcg, 18 mcg, Inhalation, Daily, Roberto Colorado MD, 18 mcg at 10/18/19 0854    traZODone (DESYREL) tablet 25 mg, 25 mg, Oral, HS PRN, Roberto Colorado MD  Justification if on more than two antipsychotics:  Only on his clozapine  Side effects if any:  None    Risks , benefits, side effects and precautions of medications discussed with patient and reminded patient to let us know any problems with medications     Objective:     Vital Signs:  Vitals:    10/17/19 1610 10/17/19 1900 10/17/19 2151 10/18/19 0730   BP: 116/78 116/70  100/64   BP Location: Left arm Left arm  Left arm   Pulse: 80 90  89   Resp: 18 17 18   Temp: 98 2 °F (36 8 °C) 97 8 °F (36 6 °C)  98 2 °F (36 8 °C)   TempSrc: Temporal Temporal  Temporal   SpO2: 98% 93% 98%    Weight:       Height:         Appearance:  age appropriate, casually dressed and overweight older than stated age   Behavior:  deviant, evasive and guarded and suspicious but with good eye contact   Speech:  normal pitch and normal volume but tends to become loud at times   Mood:  anxious and dysthymic   Affect:  mood-congruent   Thought Process:  goal directed and illogical slightly pressured but able to be redirected and talks as if in a court of low being stilted   Thought Content:  delusions  grandiose and somatic about not being able to breathe despite being on nasal oxygen and paranoid about people out to harm her and Atrovent inhaler tainteded with peanut oil  No current suicidal homicidal thoughts intent or plans reported    No phobias obsessions or compulsions reported   Perceptual Disturbances: None and does not appear responding to internal stimuli   Risk Potential: Tendency to not care for herself if she goes off treatment   Sensorium:  person, place, time/date, situation, day of week, month of year and time   Cognition:  grossly intact   Consciousness:  alert and awake    Attention: Intact concentration and attention span   Intellect: Considered to be at least of average intelligence   Insight:  limited in denial   Judgment: poor      Motor Activity: no abnormal movements         Recent Labs:  Results Reviewed     None          I/O Past 24 hours:  No intake/output data recorded  No intake/output data recorded  Assessment / Plan:     Schizoaffective disorder, bipolar type ([facility] [address] )      Reason for continued inpatient care:  Because of underlying paranoia and refusal to go back to the personal care home and inability to care for herself on her own  Acceptance by patient:  Accepting  Clara Arredondo in recovery:  About living in another personal care home once she feels better  Understanding of medications :  Has some understanding  Involved in reintegration process:  Adjusting to the unit  Trusting in relatoinship with psychiatrist:  Trusting    Recommended Treatment:    Medication changes:  1) no changes today  Non-pharmacological treatments  1) continue with  individual therapy group therapy, milieu therapy and occupational therapy and milieu therapy involving multidisciplinary team approach with psychotherapist, case management, nursing, peer support services, art therapist etc using recovery principles and psycho-education about accepting illness and need for treatment   3) encourage to attend to ADLs like taking showers and wearing clean clothes   4) Encourage to attend groups   Safety  1) Safety/communication plan established targeting dynamic risk factors above  Discharge Plan most likely back to the a:  Personal care boarding home with act services once her delusions are managed and mood becomes more stabilized and she becomes more receptive to treatment compliance    Counseling / Coordination of Care    Total floor / unit time spent today 15 minutes  Greater than 50% of total time was spent with the patient and / or family counseling and / or coordination of care  A description of the counseling / coordination of care  Patient's Rights, confidentiality and exceptions to confidentiality, use of automated medical record, Jeb Patrick staff access to medical record, and consent to treatment reviewed      Alirio Frazier MD

## 2019-10-18 NOTE — PROGRESS NOTES
10/18/19 0900   Team Meeting   Meeting Type Daily Rounds   Team Members Present   Team Members Present Physician;Nurse;; Other (Discipline and Name)   Physician Team Member Dr Shane Berg Team Member Lyndsey Olivas RN   Care Management Team Member Brenda Rendon   Other (Discipline and Name) [facility]     Patient attended fresh air group only yesterday  Still very somatic in complaints  Not taking advice or direction from staff  Slept

## 2019-10-18 NOTE — PROGRESS NOTES
Alva Jay was awake and cooperative with lab work this morning  Unable to obtain at this time CBC w/Diff  Attempt x 1 encourage to stay hydrate

## 2019-10-18 NOTE — PLAN OF CARE
Problem: PAIN - ADULT  Goal: Verbalizes/displays adequate comfort level or baseline comfort level  Description  Interventions:  - Encourage patient to monitor pain and request assistance  - Assess pain using appropriate pain scale  - Administer analgesics based on type and severity of pain and evaluate response  - Implement non-pharmacological measures as appropriate and evaluate response  - Consider cultural and social influences on pain and pain management  - Notify physician/advanced practitioner if interventions unsuccessful or patient reports new pain  10/18/2019 0111 by Jareth Jackson LPN  Outcome: Progressing  10/17/2019 2006 by Jareth Jackson LPN  Outcome: Progressing     Problem: SAFETY ADULT  Goal: Patient will remain free of falls  Description  INTERVENTIONS:  - Assess patient frequently for physical needs  -  Identify cognitive and physical deficits and behaviors that affect risk of falls    -  [place] fall precautions as indicated by assessment   - Educate patient/family on patient safety including physical limitations  - Instruct patient to call for assistance with activity based on assessment  - Modify environment to reduce risk of injury  - Consider OT/PT consult to assist with strengthening/mobility  10/18/2019 0111 by Jareth Jackson LPN  Outcome: Progressing  10/17/2019 2006 by Jareth Jackson LPN  Outcome: Progressing     Problem: RESPIRATORY - ADULT  Goal: Achieves optimal ventilation and oxygenation  Description  INTERVENTIONS:  - Assess for changes in respiratory status  - Assess for changes in mentation and behavior  - Position to facilitate oxygenation and minimize respiratory effort  - Oxygen administration by appropriate delivery method based on oxygen saturation (per order) or ABGs  - Initiate smoking cessation education as indicated  - Encourage broncho-pulmonary hygiene including cough, deep breathe, Incentive Spirometry  - Assess the need for suctioning and aspirate as needed  - Assess and instruct to report SOB or any respiratory difficulty  - Respiratory Therapy support as indicated  Outcome: Progressing     Problem: RESPIRATORY - ADULT  Goal: Achieves optimal ventilation and oxygenation  Description  INTERVENTIONS:  - Assess for changes in respiratory status  - Assess for changes in mentation and behavior  - Position to facilitate oxygenation and minimize respiratory effort  - Oxygen administration by appropriate delivery method based on oxygen saturation (per order) or ABGs  - Initiate smoking cessation education as indicated  - Encourage broncho-pulmonary hygiene including cough, deep breathe, Incentive Spirometry  - Assess the need for suctioning and aspirate as needed  - Assess and instruct to report SOB or any respiratory difficulty  - Respiratory Therapy support as indicated  Outcome: Progressing     Problem: SLEEP DISTURBANCE  Goal: Will exhibit normal sleeping pattern  Description  Interventions:  -  Assess the patients sleep pattern, noting recent changes  - Administer medication as ordered  - Decrease environmental stimuli, including noise, as appropriate during the night  - Encourage the patient to actively participate in unit groups and or exercise during the day to enhance ability to achieve adequate sleep at night  - Assess the patient, in the morning, encouraging a description of sleep experience  Outcome: Progressing      Dean is laying in bed with her eyes closed, breath even and unlabored   O2 at 1L with humidifier via NC at HS maintained throughout the night   No respiratory distress noted    Q 15 minutes checks during rounds continues   Maintained on ongoing fall and SAFE precaution without incident on this shift   No indication of pain or discomfort noted   No behavior noted   Will continue to monitor behavior and sleep pattern   Dean has a weekly scheduled lab CBC w/Diff to be obtain in the morning

## 2019-10-19 PROCEDURE — 99232 SBSQ HOSP IP/OBS MODERATE 35: CPT | Performed by: PHYSICIAN ASSISTANT

## 2019-10-19 RX ADMIN — CLOZAPINE 50 MG: 25 TABLET ORAL at 21:07

## 2019-10-19 RX ADMIN — CLOZAPINE 50 MG: 25 TABLET ORAL at 17:06

## 2019-10-19 RX ADMIN — FLUOXETINE 20 MG: 20 CAPSULE ORAL at 08:41

## 2019-10-19 RX ADMIN — THEOPHYLLINE ANHYDROUS 200 MG: 200 CAPSULE, EXTENDED RELEASE ORAL at 08:41

## 2019-10-19 RX ADMIN — PANTOPRAZOLE SODIUM 40 MG: 40 TABLET, DELAYED RELEASE ORAL at 06:00

## 2019-10-19 RX ADMIN — MONTELUKAST SODIUM 10 MG: 10 TABLET, COATED ORAL at 21:07

## 2019-10-19 RX ADMIN — FLUTICASONE FUROATE AND VILANTEROL TRIFENATATE 1 PUFF: 200; 25 POWDER RESPIRATORY (INHALATION) at 08:41

## 2019-10-19 RX ADMIN — LEVOTHYROXINE SODIUM 125 MCG: 125 TABLET ORAL at 05:59

## 2019-10-19 RX ADMIN — TIOTROPIUM BROMIDE 18 MCG: 18 CAPSULE ORAL; RESPIRATORY (INHALATION) at 08:41

## 2019-10-19 NOTE — PLAN OF CARE
Problem: Alteration in Thoughts and Perception  Goal: Verbalize thoughts and feelings  Description  Interventions:  - Promote a nonjudgmental and trusting relationship with the patient through active listening and therapeutic communication  - Assess patient's level of functioning, behavior and potential for risk  - Engage patient in 1 on 1 interactions for a minimum of 15 minutes each session  - Encourage patient to express fears, feelings, frustrations, and discuss symptoms    - [place] patient to reality, help patient recognize reality-based thinking   - Administer medications as ordered and assess for potential side effects  - Provide the patient education related to the signs and symptoms of the illness and desired effects of prescribed medications  Outcome: Progressing  Goal: Agree to be compliant with medication regime, as prescribed and report medication side effects  Description  Interventions:  - Offer appropriate PRN medication and supervise ingestion; conduct aims, as needed   Outcome: Progressing     Problem: Alteration in Thoughts and Perception  Goal: Treatment Goal: Gain control of psychotic behaviors/thinking, reduce/eliminate presenting symptoms and demonstrate improved reality functioning upon discharge  Outcome: Not Progressing  Goal: Attend and participate in unit activities, including therapeutic, recreational, and educational groups  Description  Interventions:  - Provide therapeutic and educational activities daily, encourage attendance and participation, and document same in the medical record     CERTIFIED PEER SPECIALIST INTERVENTIONS:    Complete peer assessment with patient to assess their needs and identify their goals to complete while in the recovery program as well as once discharged into the community  Patient will complete WRAP Plan, Crisis Plan and [employer] Life Domains  Patient will attend 50% of groups offered on the unit  Patient will complete a goal card weekly  Outcome: Not Progressing  Goal: Recognize dysfunctional thoughts, communicate reality-based thoughts at the time of discharge  Description  Interventions:  - Provide medication and psycho-education to assist patient in compliance and developing insight into his/her illness   Outcome: Not Progressing  Goal: Complete daily ADLs, including personal hygiene independently, as able  Description  Interventions:  - Observe, teach, and assist patient with ADLS  - Monitor and promote a balance of rest/activity, with adequate nutrition and elimination   Outcome: Not Progressing    Patient is awake, alert and visible on unit intermittently  Out of bed for meds and meals only  Patient is non compliant with hygiene and is resistant and argumentative with staff interventions to accomplish compliance with needed shower to eliminate patient body odor  Patient has no difficulty getting out of bed to dining room or to medication window for meals, snacks and meds but voices inability to ambulate to shower, even with assist   With the presence of female  the patient was agreeable to getting into the shower and proceeded to take care of her hygiene without difficulty  Female officer was then released by the patient who began to feel safe with staff assisting patient with completion of hygiene  Patients clipped and filed her finger nails  Pulse ox was 98% on room air and BP and pulse were wdl  She continues on dental soft diet and thin liquids  Patient ate 50% of both meals and had a snack  She did not attend groups  Patient is highly anxious and minimally depressed and verbalized that she is a better "nurse" than the staff here    Will continue to monitor progress in recovery program

## 2019-10-19 NOTE — PLAN OF CARE
Pt resting in bed , with oxygen concentrator at 1l/min via n/c, no complaints offered, will monitor for change in behavior/assessment  No respiratory distress noted  Problem: PAIN - ADULT  Goal: Verbalizes/displays adequate comfort level or baseline comfort level  Description  Interventions:  - Encourage patient to monitor pain and request assistance  - Assess pain using appropriate pain scale  - Administer analgesics based on type and severity of pain and evaluate response  - Implement non-pharmacological measures as appropriate and evaluate response  - Consider cultural and social influences on pain and pain management  - Notify physician/advanced practitioner if interventions unsuccessful or patient reports new pain  Outcome: Progressing     Problem: SAFETY ADULT  Goal: Patient will remain free of falls  Description  INTERVENTIONS:  - Assess patient frequently for physical needs  -  Identify cognitive and physical deficits and behaviors that affect risk of falls    -  [place] fall precautions as indicated by assessment   - Educate patient/family on patient safety including physical limitations  - Instruct patient to call for assistance with activity based on assessment  - Modify environment to reduce risk of injury  - Consider OT/PT consult to assist with strengthening/mobility  Outcome: Progressing     Problem: RESPIRATORY - ADULT  Goal: Achieves optimal ventilation and oxygenation  Description  INTERVENTIONS:  - Assess for changes in respiratory status  - Assess for changes in mentation and behavior  - Position to facilitate oxygenation and minimize respiratory effort  - Oxygen administration by appropriate delivery method based on oxygen saturation (per order) or ABGs  - Initiate smoking cessation education as indicated  - Encourage broncho-pulmonary hygiene including cough, deep breathe, Incentive Spirometry  - Assess the need for suctioning and aspirate as needed  - Assess and instruct to report SOB or any respiratory difficulty  - Respiratory Therapy support as indicated  Outcome: Progressing     Problem: SLEEP DISTURBANCE  Goal: Will exhibit normal sleeping pattern  Description  Interventions:  -  Assess the patients sleep pattern, noting recent changes  - Administer medication as ordered  - Decrease environmental stimuli, including noise, as appropriate during the night  - Encourage the patient to actively participate in unit groups and or exercise during the day to enhance ability to achieve adequate sleep at night  - Assess the patient, in the morning, encouraging a description of sleep experience  Outcome: Progressing     Problem: Nutrition/Hydration-ADULT  Goal: Nutrient/Hydration intake appropriate for improving, restoring or maintaining nutritional needs  Description  Monitor and assess patient's nutrition/hydration status for malnutrition  Collaborate with interdisciplinary team and initiate plan and interventions as ordered  Monitor patient's weight and dietary intake as ordered or per policy  Utilize nutrition screening tool and intervene as necessary  Determine patient's food preferences and provide high-protein, high-caloric foods as appropriate       INTERVENTIONS:  - Monitor oral intake, urinary output, labs, and treatment plans  - Assess nutrition and hydration status and recommend course of action  - Evaluate amount of meals eaten  - Assist patient with eating if necessary   - Allow adequate time for meals  - Recommend/ encourage appropriate diets, oral nutritional supplements, and vitamin/mineral supplements  - Order, calculate, and assess calorie counts as needed  - Recommend, monitor, and adjust tube feedings and TPN/PPN based on assessed needs  - Assess need for intravenous fluids  - Provide specific nutrition/hydration education as appropriate  - Include patient/family/caregiver in decisions related to nutrition  Outcome: Progressing   Pt awoke at change of shift and quietly sat in dining area, closely observed and pt asked for water, water given  and then pt allowed rashid SOLORIO to draw labs, lab draw to be retried , pt declined another stick at this time, o2 at 1 liter/min with humidifier, via n/c at hs , Maintained thru the night, no respiratory distress noted

## 2019-10-19 NOTE — PLAN OF CARE
Problem: Alteration in Thoughts and Perception  Goal: Verbalize thoughts and feelings  Description  Interventions:  - Promote a nonjudgmental and trusting relationship with the patient through active listening and therapeutic communication  - Assess patient's level of functioning, behavior and potential for risk  - Engage patient in 1 on 1 interactions for a minimum of 15 minutes each session  - Encourage patient to express fears, feelings, frustrations, and discuss symptoms    - [place] patient to reality, help patient recognize reality-based thinking   - Administer medications as ordered and assess for potential side effects  - Provide the patient education related to the signs and symptoms of the illness and desired effects of prescribed medications  Outcome: Progressing     Problem: Risk for Self Injury/Neglect  Goal: Refrain from harming self  Description  Interventions:  - Monitor patient closely, per order  - Develop a trusting relationship  - Supervise medication ingestion, monitor effects and side effects   Outcome: Progressing     Problem: SAFETY ADULT  Goal: Patient will remain free of falls  Description  INTERVENTIONS:  - Assess patient frequently for physical needs  -  Identify cognitive and physical deficits and behaviors that affect risk of falls    -  [place] fall precautions as indicated by assessment   - Educate patient/family on patient safety including physical limitations  - Instruct patient to call for assistance with activity based on assessment  - Modify environment to reduce risk of injury  - Consider OT/PT consult to assist with strengthening/mobility  Outcome: Progressing     Problem: Alteration in Thoughts and Perception  Goal: Attend and participate in unit activities, including therapeutic, recreational, and educational groups  Description  Interventions:  - Provide therapeutic and educational activities daily, encourage attendance and participation, and document same in the medical record     CERTIFIED PEER SPECIALIST INTERVENTIONS:    Complete peer assessment with patient to assess their needs and identify their goals to complete while in the recovery program as well as once discharged into the community  Patient will complete WRAP Plan, Crisis Plan and 5 Life Domains  Patient will attend 50% of groups offered on the unit  Patient will complete a goal card weekly  Outcome: Not Progressing  Goal: Complete daily ADLs, including personal hygiene independently, as able  Description  Interventions:  - Observe, teach, and assist patient with ADLS  - Monitor and promote a balance of rest/activity, with adequate nutrition and elimination   Outcome: Not Progressing     Problem: Depression  Goal: Refrain from isolation  Description  Interventions:  - Develop a trusting relationship   - Encourage socialization   Outcome: Not Progressing  Goal: Refrain from self-neglect  Outcome: Not Bailey Cortez has been isolative to her room most of the shift  She likes to be in bed  Encouraged by staff to come out of her room  Pleasant and cooperative upon approach  Able to make needs known  Has not been somatic this shift  Took pills with prompting  Ate 100% of her meal  No issue with swallowing  No shower this evening  Did not go to evening group  Ate snack and took HS medication  Stated that she needed her sugar checked because she did not eat much  She ate 100% of her meal and ate snack  No issue with eating  Somatic prior to going to bed  Oxygen saturation 97% prior to oxygen being applied via nasal cannula at 1 liter  Continue to monitor  Precautions maintained

## 2019-10-19 NOTE — PROGRESS NOTES
Patient continues poorly hydrated, non compliant with encouragement to hydrate, and is still in need of CBC w/diff  Patient has history of difficult stick  Call to ICU and ED for assistance with specimen collection were ineffective for obtaining assistance on this shift  Will continue to encourage hydration and pursue collection

## 2019-10-19 NOTE — PROGRESS NOTES
Progress Note - Behavioral Health     Roselia Woody 60 y o  female MRN: [id number]  Unit/Bed#: [facility] 110-02 Encounter: [id number]    Roselia Woody was seen for continuing care and reviewed with treatment team   Pt with h/o Schizoaffective Disorder, is familiar to me when I saw her various times earlier this year  I started Pt on Clozapine 7/12/2019  She is awake an sitting alone in the day room on my approach and presently reports I am sick today I do not know what's wrong    She complains that her blood pressure was low very early this morning and was told she was not drinking enough water  She states that her blood sugar was also low (by her own opinion) because she felt nauseous at that time and perseverates on her annoyance that a fingerstick was not taken and she repeatedly requests to have it done at this time  She states that she is depressed--a little more because of the way I have been treated    She seems a bit paranoid, though makes no overt statements of such  No verbalized delusions about a micro chip in her arm during this interview  She states that she refused the nurses suggestion to shower due to her low blood pressure and she is anxious because of her swallow issue  She presently denies SI, HI, or side any psychotic symptoms  Floor team relayed that patient resists recommendations by nursing, does not attend groups, and makes no real effort to improve her condition  She continues to voice somatic complaints--most recently about swallowing difficulty  Patient has been evaluated for her swallowing issue by GI and barium esophagram showed the suggestion of a large hiatal hernia which could be causing an angulation of the lower esophagus  A soft mechanical diet was recommended, and esophageal motility study and possible surgical correction in the future if need be high  Later this afternoon she did agree to shower with the support of the staff        Sleep:Good  Appetite: Adequate--though not as good as her normal  Medication side effects: None per Pt    ROS: No complaints per Pt    Labs/Barium swallow/CXR: Reviewed    Mental Status Evaluation:  Appearance:  Dressed, Fair hygiene, Good eye contact   Behavior:  Calm cooperative but disparage of staff   Speech:  Clear, normal rate and volume hyperverbal but not pressured   Mood:  Anxious, Depressed   Affect:  Constricted   Thought Process:  Organized, Goal directed, Negative, perseverative her blood pressure and getting a fingerstick taken   Associations: Intact associations   Thought Content:  No verbalized delusions   Perceptual Disturbances: Pt denies any hallucinations or paranoia and does not appear to be responding to internal stimuli  the behavior indicates that she still has some degree of paranoia   Risk Potential: Pt presently denies SI or HI    Sensorium:  Self, Place, Day of the week, Month, Year   Memory:  short term memory grossly intact   Consciousness:  alert, awake   Attention: Good   Insight:  Limited   Judgment: Limited   Gait/Station: Slow and steady gait   Motor Activity: No abnormal movements     Vitals:    10/18/19 2053 10/19/19 0730 10/19/19 1402 10/19/19 1500   BP:  118/68  116/70   BP Location:  Right arm  Left arm   Pulse: 88 92 93 96   Resp:  19  16   Temp:  (!) 97 2 °F (36 2 °C)  97 6 °F (36 4 °C)   TempSrc:    Temporal   SpO2: 97%  98% 93%   Weight:       Height:           I have personally reviewed all pertinent laboratory/tests results    Most Recent Labs:   Lab Results   Component Value Date    WBC 8 20 10/12/2019    RBC 4 51 10/12/2019    HGB 13 7 10/12/2019    HCT 42 0 10/12/2019     10/12/2019    RDW 13 5 10/12/2019    NEUTROABS 4 40 10/12/2019    SODIUM 138 08/21/2019    K 4 3 08/21/2019     08/21/2019    CO2 28 08/21/2019    BUN 17 08/21/2019    CREATININE 0 68 08/21/2019    GLUCOSE 85 05/18/2015    GLUC 114 (H) 08/21/2019    GLUF 84 05/31/2019    CALCIUM 9 5 08/21/2019    AST 19 08/21/2019    ALT 11 08/21/2019    ALKPHOS 105 08/21/2019    TP 6 9 08/21/2019    ALB 3 7 08/21/2019    TBILI 0 60 08/21/2019    CHOLESTEROL 261 (H) 07/24/2019    HDL 52 07/24/2019    TRIG 154 (H) 07/24/2019    LDLCALC 178 (H) 07/24/2019    NONHDLC 209 07/24/2019    LITHIUM <0 2 (L) 05/01/2019    AMMONIA 13 02/22/2016    ONP4NIRTWEEX 0 985 08/21/2019    FREET4 1 4 05/12/2015    RPR Non-Reactive 05/02/2019    HGBA1C 5 5 01/17/2019     01/17/2019     Clozapine:   Lab Results   Component Value Date    CLOZAPINE 324 (L) 08/16/2019    NCLOZIP 207 08/16/2019       Progress Toward Goals: Based on today's interview and review of prior notes, Pt made some progress in that she finally did agree to take a shower  However she has he had from much improvement  Continue to encourage group attendance, daily hygiene, and greater investment in her own psychiatric healthcare  Continue the present medication management and weekly CBC for ANC monitoring  Assessment/Plan   Principal Problem:    Schizoaffective disorder, bipolar type ([address] )  Active Problems:    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[address] )    Acquired hypothyroidism    Gastroesophageal reflux disease without esophagitis    At risk for aspiration      Recommended Treatment: Continue with pharmacotherapy, group therapy, milieu therapy and occupational therapy    The patient will be maintained on the following medications:    Current Facility-Administered Medications:  acetaminophen 650 mg Oral Q6H PRN Christian Bennett MD   albuterol 2 puff Inhalation Q4H PRN Christian Bennett MD   aluminum-magnesium hydroxide-simethicone 15 mL Oral Q4H PRN Christian Bennett MD   ammonium lactate 1 application Topical BID PRN Christian Bennett MD   benzonatate 100 mg Oral TID PRN Christian Bennett MD   benztropine 1 mg Intramuscular Q8H PRN Christian Bennett MD   cloZAPine 25 mg Oral HS Christian Bennett MD   cloZAPine 50 mg Oral BID Christian Bennett MD   EPINEPHrine PF 0 15 mg Intramuscular Once PRN Christian Bennett MD   FLUoxetine 20 mg Oral Daily Shi Pham, MD   fluticasone-vilanterol 1 puff Inhalation Daily [facility], MD   ketotifen 1 drop Right Eye BID PRN [facility], MD   levothyroxine 125 mcg Oral Early Morning [facility], MD   magnesium hydroxide 30 mL Oral Daily PRN [facility], MD   montelukast 10 mg Oral HS [facility], MD   OLANZapine 5 mg Intramuscular Q8H PRN [facility], MD   OLANZapine 5 mg Oral Q8H PRN [facility], MD   ondansetron 4 mg Oral Q6H PRN [facility], MD   pantoprazole 40 mg Oral Early Morning [facility], MD   polyethylene glycol 17 g Oral Daily PRN [facility], MD   polyvinyl alcohol 1 drop Both Eyes Q3H PRN [facility], MD   theophylline 200 mg Oral Daily [facility], MD   tiotropium 18 mcg Inhalation Daily [facility], MD   traZODone 25 mg Oral HS PRN [facility], MD       Risks, benefits and possible side effects of Medications:   Risks, benefits, and possible side effects of medications explained to patient and patient verbalizes understanding

## 2019-10-19 NOTE — PROGRESS NOTES
Pt awoke and requesting her blood sugar, to be taken , no s/s of hypo or hyper glycemia, noted, Pt encouraged to drink water and to help keep hydrated for blood draws   Pt stated she would drink thruought the day to keep herself hydrated  Pt VS taken and documented, pt verbalized she will talk to the dr today so when can get her blood sugar taken as she requests,she feels she should have have this as a standing order,every day, so she said she will requestthat the Dr place an order for the same  Pt sitting by nursing station, no distress noted  B/p 90/60 p: 96 R 20 T 97 6 pulse ox 94%

## 2019-10-20 LAB
BASOPHILS # BLD AUTO: 0 THOUSANDS/ΜL (ref 0–0.1)
BASOPHILS NFR BLD AUTO: 1 % (ref 0–1)
EOSINOPHIL # BLD AUTO: 0.1 THOUSAND/ΜL (ref 0–0.4)
EOSINOPHIL NFR BLD AUTO: 2 % (ref 0–6)
ERYTHROCYTE [DISTWIDTH] IN BLOOD BY AUTOMATED COUNT: 13.5 %
HCT VFR BLD AUTO: 44.7 % (ref 36–46)
HGB BLD-MCNC: 14.7 G/DL (ref 12–16)
LYMPHOCYTES # BLD AUTO: 2.9 THOUSANDS/ΜL (ref 0.5–4)
LYMPHOCYTES NFR BLD AUTO: 45 % (ref 25–45)
MCH RBC QN AUTO: 30.7 PG (ref 26–34)
MCHC RBC AUTO-ENTMCNC: 32.9 G/DL (ref 31–36)
MCV RBC AUTO: 93 FL (ref 80–100)
MONOCYTES # BLD AUTO: 0.7 THOUSAND/ΜL (ref 0.2–0.9)
MONOCYTES NFR BLD AUTO: 11 % (ref 1–10)
NEUTROPHILS # BLD AUTO: 2.7 THOUSANDS/ΜL (ref 1.8–7.8)
NEUTS SEG NFR BLD AUTO: 42 % (ref 45–65)
PLATELET # BLD AUTO: 223 THOUSANDS/UL (ref 150–450)
PMV BLD AUTO: 9.8 FL (ref 8.9–12.7)
RBC # BLD AUTO: 4.79 MILLION/UL (ref 4–5.2)
WBC # BLD AUTO: 6.4 THOUSAND/UL (ref 4.5–11)

## 2019-10-20 PROCEDURE — 99232 SBSQ HOSP IP/OBS MODERATE 35: CPT | Performed by: PHYSICIAN ASSISTANT

## 2019-10-20 PROCEDURE — 85025 COMPLETE CBC W/AUTO DIFF WBC: CPT | Performed by: PSYCHIATRY & NEUROLOGY

## 2019-10-20 RX ADMIN — THEOPHYLLINE ANHYDROUS 200 MG: 200 CAPSULE, EXTENDED RELEASE ORAL at 09:19

## 2019-10-20 RX ADMIN — CLOZAPINE 50 MG: 25 TABLET ORAL at 21:09

## 2019-10-20 RX ADMIN — MONTELUKAST SODIUM 10 MG: 10 TABLET, COATED ORAL at 21:10

## 2019-10-20 RX ADMIN — PANTOPRAZOLE SODIUM 40 MG: 40 TABLET, DELAYED RELEASE ORAL at 06:18

## 2019-10-20 RX ADMIN — FLUTICASONE FUROATE AND VILANTEROL TRIFENATATE 1 PUFF: 200; 25 POWDER RESPIRATORY (INHALATION) at 09:20

## 2019-10-20 RX ADMIN — FLUOXETINE 20 MG: 20 CAPSULE ORAL at 09:20

## 2019-10-20 RX ADMIN — CLOZAPINE 50 MG: 25 TABLET ORAL at 17:06

## 2019-10-20 RX ADMIN — LEVOTHYROXINE SODIUM 125 MCG: 125 TABLET ORAL at 06:17

## 2019-10-20 RX ADMIN — TIOTROPIUM BROMIDE 18 MCG: 18 CAPSULE ORAL; RESPIRATORY (INHALATION) at 09:20

## 2019-10-20 NOTE — PLAN OF CARE
Problem: PAIN - ADULT  Goal: Verbalizes/displays adequate comfort level or baseline comfort level  Description  Interventions:  - Encourage patient to monitor pain and request assistance  - Assess pain using appropriate pain scale  - Administer analgesics based on type and severity of pain and evaluate response  - Implement non-pharmacological measures as appropriate and evaluate response  - Consider cultural and social influences on pain and pain management  - Notify physician/advanced practitioner if interventions unsuccessful or patient reports new pain  Outcome: Progressing     Problem: SAFETY ADULT  Goal: Patient will remain free of falls  Description  INTERVENTIONS:  - Assess patient frequently for physical needs  -  Identify cognitive and physical deficits and behaviors that affect risk of falls    -  [place] fall precautions as indicated by assessment   - Educate patient/family on patient safety including physical limitations  - Instruct patient to call for assistance with activity based on assessment  - Modify environment to reduce risk of injury  - Consider OT/PT consult to assist with strengthening/mobility  Outcome: Progressing     Problem: RESPIRATORY - ADULT  Goal: Achieves optimal ventilation and oxygenation  Description  INTERVENTIONS:  - Assess for changes in respiratory status  - Assess for changes in mentation and behavior  - Position to facilitate oxygenation and minimize respiratory effort  - Oxygen administration by appropriate delivery method based on oxygen saturation (per order) or ABGs  - Initiate smoking cessation education as indicated  - Encourage broncho-pulmonary hygiene including cough, deep breathe, Incentive Spirometry  - Assess the need for suctioning and aspirate as needed  - Assess and instruct to report SOB or any respiratory difficulty  - Respiratory Therapy support as indicated  Outcome: Progressing     Problem: SLEEP DISTURBANCE  Goal: Will exhibit normal sleeping pattern  Description  Interventions:  -  Assess the patients sleep pattern, noting recent changes  - Administer medication as ordered  - Decrease environmental stimuli, including noise, as appropriate during the night  - Encourage the patient to actively participate in unit groups and or exercise during the day to enhance ability to achieve adequate sleep at night  - Assess the patient, in the morning, encouraging a description of sleep experience  Outcome: Progressing    ~Maggie is laying in bed with her eyes closed, breath even and unlabored   O2 at 1L with humidifier via NC at HS maintained throughout the night   No respiratory distress noted    Q 15 minutes checks during rounds continues   Maintained on ongoing fall and SAFE precaution without incident on this shift   No indication of pain or discomfort noted   No behavior noted   Will continue to monitor behavior and sleep pattern   ~Maggie has lab work to be obtain in the morning CBC/Diff

## 2019-10-20 NOTE — PROGRESS NOTES
Progress Note - Behavioral Health     Chad Meyer 60 y o  female MRN: [id number]  Unit/Bed#: [facility] 189-42 Encounter: [id number]    Chad Meyer was seen for continuing care and reviewed with treatment team   Pt was up for breakfast then resting in bed on my approach  She was pleasant, smiling as she reported feeling "Good this morning  I'm alright, I had a shower yesterday "  She rates her depression at "5/10 with all the circumstances  Her anxiety is the same and related to fear of choking  However, she states her meal this morning was very soft and this made her feel more at ease, since she was able to eat without a problem  In regards to paranoia--she became a bit guarded and stated "There's some people that you don't trust, that's life "  She was fully oriented, but in the middle of the orientation questions, she started talking about her son who is in the [facility] and how she has not seen him or spoken to him in a while  She did leave a msg on his voice mail yesterday  Pt presently denies SI, HI, and hallucinations, but does not directly admit to paranoia  Floor team relayed that Pt was compliant with most medications but refused the 25mg evening dose of Clozapine--but uncertain why  She was more visible and social with staff but did not attend group yesterday  No specific somatic complaints noted today as of yet       Sleep:Good  Appetite: Good   Medication side effects: None per Pt    ROS: No complaints per Pt    Labs/Tests: Reviewed    Mental Status Evaluation:  Appearance:  Dressed, clean, good eye contact   Behavior:  Calm, cooperative, pleasant   Speech:  Clear, normal rate and volume   Mood:  Anxious, Less depressed   Affect:  Brighter  today   Thought Process:  Organized, Goal directed, negative, perseverative, somatically preoccupied, perseverative regarding her son   Associations: Intact associations   Thought Content:  No verbalized delusions   Perceptual Disturbances: Pt denies any hallucinations or paranoia and does not appear to be responding to internal stimuli   Risk Potential: Pt presently denies SI or HI    Sensorium:  Self, Place, Day of the week, Month, Year   Memory:  short term memory grossly intact   Consciousness:  alert, awake   Attention: Good   Insight:  Limited   Judgment: Limited   Gait/Station: Slow and steady   Motor Activity: No abnormal movements     Vitals:    10/19/19 1402 10/19/19 1500 10/19/19 1900 10/20/19 0730   BP:  116/70 107/64 99/61   BP Location:  Left arm Left arm Right arm   Pulse: 93 96 96 82   Resp:  16 16 18   Temp:  97 6 °F (36 4 °C) 97 6 °F (36 4 °C) (!) 97 4 °F (36 3 °C)   TempSrc:  Temporal Temporal    SpO2: 98% 93% 95%    Weight:    67 7 kg (149 lb 3 2 oz)   Height:           CBC:   Lab Results   Component Value Date    WBC 6 40 10/20/2019    RBC 4 79 10/20/2019    HGB 14 7 10/20/2019    HCT 44 7 10/20/2019    MCV 93 10/20/2019     10/20/2019    MCH 30 7 10/20/2019    MCHC 32 9 10/20/2019    RDW 13 5 10/20/2019    MPV 9 8 10/20/2019    NEUTROABS 2 70 10/20/2019       Progress Toward Goals:  No significant change  Continue the present regimen and continue to encourage compliance, hygiene and greater investment in her care  ANC WNL to continue Clozapine  Assessment/Plan   Principal Problem:    Schizoaffective disorder, bipolar type ([facility] )  Active Problems:    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[facility] [address] )    Acquired hypothyroidism    Gastroesophageal reflux disease without esophagitis    At risk for aspiration      Recommended Treatment: Continue with pharmacotherapy, group therapy, milieu therapy and occupational therapy    The patient will be maintained on the following medications:    Current Facility-Administered Medications:  acetaminophen 650 mg Oral Q6H PRN Zac Sierra MD   albuterol 2 puff Inhalation Q4H PRN Zac Sierra MD   aluminum-magnesium hydroxide-simethicone 15 mL Oral Q4H PRN Zac Sierra MD   ammonium lactate 1 application Topical BID PRN Allie Guzman MD   benzonatate 100 mg Oral TID PRN Allie Guzman MD   benztropine 1 mg Intramuscular Q8H PRN Allie Guzman MD   cloZAPine 25 mg Oral HS Allie Guzman MD   cloZAPine 50 mg Oral BID Allie Guzman MD   EPINEPHrine PF 0 15 mg Intramuscular Once PRN Allie Guzman MD   FLUoxetine 20 mg Oral Daily Allie Guzman MD   fluticasone-vilanterol 1 puff Inhalation Daily Allie Guzman MD   ketotifen 1 drop Right Eye BID PRN Allie Guzman MD   levothyroxine 125 mcg Oral Early Morning Allie Guzman MD   magnesium hydroxide 30 mL Oral Daily PRN Allie Guzman MD   montelukast 10 mg Oral HS Allie Guzman MD   OLANZapine 5 mg Intramuscular Q8H PRN Allie Guzman MD   OLANZapine 5 mg Oral Q8H PRN Allie Guzman MD   ondansetron 4 mg Oral Q6H PRN Allie Guzman MD   pantoprazole 40 mg Oral Early Morning Allie Guzman MD   polyethylene glycol 17 g Oral Daily PRN Allie Guzman MD   polyvinyl alcohol 1 drop Both Eyes Q3H PRN Allie Guzman MD   theophylline 200 mg Oral Daily Allie Guzman MD   tiotropium 18 mcg Inhalation Daily Allie Guzman MD   traZODone 25 mg Oral HS PRN Allie Guzman MD       Risks, benefits and possible side effects of Medications:   Risks, benefits, and possible side effects of medications explained to patient and patient verbalizes understanding

## 2019-10-20 NOTE — PLAN OF CARE
Problem: Alteration in Thoughts and Perception  Goal: Agree to be compliant with medication regime, as prescribed and report medication side effects  Description  Interventions:  - Offer appropriate PRN medication and supervise ingestion; conduct aims, as needed   Outcome: Progressing     Problem: Risk for Self Injury/Neglect  Goal: Treatment Goal: Remain safe during length of stay, learn and adopt new coping skills, and be free of self-injurious ideation, impulses and acts at the time of discharge  Outcome: Progressing  Goal: Verbalize thoughts and feelings  Description  Interventions:  - Assess and re-assess patient's lethality and potential for self-injury  - Engage patient in 1:1 interactions, daily, for a minimum of 15 minutes  - Encourage patient to express feelings, fears, frustrations, hopes  - Establish rapport/trust with patient   Outcome: Progressing     Problem: Alteration in Thoughts and Perception  Goal: Treatment Goal: Gain control of psychotic behaviors/thinking, reduce/eliminate presenting symptoms and demonstrate improved reality functioning upon discharge  Outcome: Not Progressing  Goal: Verbalize thoughts and feelings  Description  Interventions:  - Promote a nonjudgmental and trusting relationship with the patient through active listening and therapeutic communication  - Assess patient's level of functioning, behavior and potential for risk  - Engage patient in 1 on 1 interactions for a minimum of 15 minutes each session  - Encourage patient to express fears, feelings, frustrations, and discuss symptoms    - [place] patient to reality, help patient recognize reality-based thinking   - Administer medications as ordered and assess for potential side effects  - Provide the patient education related to the signs and symptoms of the illness and desired effects of prescribed medications  Outcome: Not Progressing  Goal: Attend and participate in unit activities, including therapeutic, recreational, and educational groups  Description  Interventions:  - Provide therapeutic and educational activities daily, encourage attendance and participation, and document same in the medical record     CERTIFIED PEER SPECIALIST INTERVENTIONS:    Complete peer assessment with patient to assess their needs and identify their goals to complete while in the recovery program as well as once discharged into the community  Patient will complete WRAP Plan, Crisis Plan and 5 Life Domains  Patient will attend 50% of groups offered on the unit  Patient will complete a goal card weekly      Outcome: Not Progressing  Goal: Recognize dysfunctional thoughts, communicate reality-based thoughts at the time of discharge  Description  Interventions:  - Provide medication and psycho-education to assist patient in compliance and developing insight into his/her illness   Outcome: Not Progressing  Goal: Complete daily ADLs, including personal hygiene independently, as able  Description  Interventions:  - Observe, teach, and assist patient with ADLS  - Monitor and promote a balance of rest/activity, with adequate nutrition and elimination   Outcome: Not Progressing     Problem: Risk for Self Injury/Neglect  Goal: Refrain from harming self  Description  Interventions:  - Monitor patient closely, per order  - Develop a trusting relationship  - Supervise medication ingestion, monitor effects and side effects   Outcome: Not Progressing  Goal: Attend and participate in unit activities, including therapeutic, recreational, and educational groups  Description  Interventions:  - Provide therapeutic and educational activities daily, encourage attendance and participation, and document same in the medical record  - Obtain collateral information, encourage visitation and family involvement in care   Outcome: Not Progressing  Goal: Recognize maladaptive responses and adopt new coping mechanisms  Outcome: Not Progressing  Goal: Complete daily ADLs, including personal hygiene independently, as able  Description  Interventions:  - Observe, teach, and assist patient with ADLS  - Monitor and promote a balance of rest/activity, with adequate nutrition and elimination  Outcome: Not Progressing    Patient is alert, awake and isolative to room and self  Med and meal compliant  Out of bed for meals independently  Denies pain, discomfort and distress  Somatic complaints about weakness and "not feeling well"  Patient is encouraged to ambulate on unit and hydrate to enhance feeling of wellness  She appears depressed but denies same  Presents with s/s of anxiety r/t to somatic health issues  Patient denies SI's and Hi's  She did not attend group  She did not attend to hygiene  Will continue to monitor progress in recovery program      CBC w/ diff was successfully collected  ANC is 2 70  All other values are WDL

## 2019-10-20 NOTE — PLAN OF CARE
Problem: Alteration in Thoughts and Perception  Goal: Agree to be compliant with medication regime, as prescribed and report medication side effects  Description  Interventions:  - Offer appropriate PRN medication and supervise ingestion; conduct aims, as needed   Outcome: Progressing  Goal: Complete daily ADLs, including personal hygiene independently, as able  Description  Interventions:  - Observe, teach, and assist patient with ADLS  - Monitor and promote a balance of rest/activity, with adequate nutrition and elimination   Outcome: Progressing     Problem: Alteration in Thoughts and Perception  Goal: Attend and participate in unit activities, including therapeutic, recreational, and educational groups  Description  Interventions:  - Provide therapeutic and educational activities daily, encourage attendance and participation, and document same in the medical record     CERTIFIED PEER SPECIALIST INTERVENTIONS:    Complete peer assessment with patient to assess their needs and identify their goals to complete while in the recovery program as well as once discharged into the community  Patient will complete WRAP Plan, Crisis Plan and 5 Life Domains  Patient will attend 50% of groups offered on the unit  Patient will complete a goal card weekly  Outcome: Not Progressing  Goal: Recognize dysfunctional thoughts, communicate reality-based thoughts at the time of discharge  Description  Interventions:  - Provide medication and psycho-education to assist patient in compliance and developing insight into his/her illness   Outcome: Not Progressing     Pt more visible on unit, social with staff  Pt compliant with meds and meals  Ate 25% of dinner and had snack  Pt did shower today  Pt did not attend group this evening  Pt remains somatic  Initially talking about inner ear issues and her equilibrium being off  Pt states yesterday she felt she was going to black out   Pt believes this is related to the increase in her evening clozaril  Pt only wants to take 50mg of her clozaril again  Then pt moved on to being concerned about her Glucerna and how much sugar is in it  Pt overheard calling son x2, and leaving voicemail  Pt states, "its me  Your mom  I'm waiting for you to come visit  There's only 3 more days until you ship off to the [facility] and I would like to see you before you go"  She sat by the phone and did not go to group this evening, pt reports it was because she drank water, and she knew if she went on the deck she would have to come back in to urinate, however pt did not urinate at all  Her somatic symptoms seemed to correlate with her stressing about her son  Pt refused 25mg of Clozaril this evening, only taking 50mg instead of the 75mg  Compliant with meds and meals  Pt at 98% on RA before being put on O2 concentrator  No other issues  Will continue to monitor

## 2019-10-21 PROCEDURE — 99231 SBSQ HOSP IP/OBS SF/LOW 25: CPT | Performed by: PSYCHIATRY & NEUROLOGY

## 2019-10-21 RX ADMIN — LEVOTHYROXINE SODIUM 125 MCG: 125 TABLET ORAL at 05:26

## 2019-10-21 RX ADMIN — PANTOPRAZOLE SODIUM 40 MG: 40 TABLET, DELAYED RELEASE ORAL at 05:26

## 2019-10-21 RX ADMIN — CLOZAPINE 50 MG: 25 TABLET ORAL at 21:23

## 2019-10-21 RX ADMIN — MONTELUKAST SODIUM 10 MG: 10 TABLET, COATED ORAL at 21:24

## 2019-10-21 RX ADMIN — CLOZAPINE 25 MG: 25 TABLET ORAL at 21:24

## 2019-10-21 RX ADMIN — FLUTICASONE FUROATE AND VILANTEROL TRIFENATATE 1 PUFF: 200; 25 POWDER RESPIRATORY (INHALATION) at 08:55

## 2019-10-21 RX ADMIN — CLOZAPINE 50 MG: 25 TABLET ORAL at 16:47

## 2019-10-21 RX ADMIN — FLUOXETINE 20 MG: 20 CAPSULE ORAL at 08:54

## 2019-10-21 RX ADMIN — THEOPHYLLINE ANHYDROUS 200 MG: 200 CAPSULE, EXTENDED RELEASE ORAL at 08:54

## 2019-10-21 RX ADMIN — TIOTROPIUM BROMIDE 18 MCG: 18 CAPSULE ORAL; RESPIRATORY (INHALATION) at 08:54

## 2019-10-21 NOTE — PROGRESS NOTES
10/21/19 1000   Team Meeting   Meeting Type Tx Team Meeting   Initial Conference Date 10/21/19   Next Conference Date 10/28/19   Team Members Present   Team Members Present Physician;Nurse;; Other (Discipline and Name)   Physician Team Member Dr Luz Murphy RN   Care Management Team Member Brenda Pagan   Other (Discipline and Name) [place], [place]   Patient/Family Present   Patient Present Yes   Patient's Family Present No        10/21/19 1000   Team Meeting   Meeting Type Tx Team Meeting   Initial Conference Date 10/21/19   Next Conference Date 10/28/19   Team Members Present   Team Members Present Physician;Nurse;; Other (Discipline and Name)   Physician Team Member Dr Shannon Parson Team Member Samuel Velazquez RN   Care Management Team Member Brenda Pagan   Other (Discipline and Name) [place], [place]   Patient/Family Present   Patient Present Yes   Patient's Family Present No     Patient attended treatment team meeting this morning without her self assessment completed  Patient attended 29% of groups offered last week  Patient very preoccupied about her diet and upset that she has been upgraded on her diet and she feels she is not able to eat that food despite the fact that the doctors have already cleared her for the upgraded diet  Patient is too selective about her Clozaril, not taking it the way it is prescribed  Dr Naday Bai discussed the possibility of discontinuing the Clozaril due to her not cooperating with how it is prescribed and it still not improving her psychosomatic complaints  Therapist challenging patient noting she is not willing to work with the team and follow the advice of the medical staff  Asked patient how she is going to be managed in the community if she can barely be managed in the hospital because of how inflexible she has been   Team and patient completed risk assessment and the patient did not verbalize any desire to elope from the program  Patient verbalized understanding of consequences of eloping from treatment while on a commitment  Patient verbalized no further questions or concerns at the conclusion of the meeting

## 2019-10-21 NOTE — PROGRESS NOTES
Patient declined group      10/21/19 1100   Activity/Group Checklist   Group   (IMR/Relapse Prevention Plan )   Attendance Did not attend   Attendance Duration (min) 46-60   Affect/Mood IRMA

## 2019-10-21 NOTE — ASSESSMENT & PLAN NOTE
Psychiatry Progress Note    Patient did improve with her group attendance going up to 29% last week  She is still reluctant to take showers but does after lot of reminders by staff  She continues to verbalize that she does not feel quite safe on the unit and become psychosomatic claiming that she cannot breathe or swallow even though she does appear to breathe fine while observed and swallowing study was negative and she is still demandng to be on chopped diet  She is still  suspicious about certain staff who gives her medications like her inhalers and the spirometer off and on and now asking to see respiratory for her portable oxygen which she wants to use when she gets out of the unit even though she has not used it here at all    She was again reminded that  if she maintains her 25% group attendance and attends to her ADLs skills,  we can always look into discharging her back to the [facility] home again  She continues to present with stilted speech being too formal as if talking in a court of law  She does not admit to any overt hallucinations or paranoia but still appears to harbor some covert  paranoia based on her demeanor and interaction on the unit and admits she does have "psychosomatic sxs"     She no longer believes that there is a micro chip on her right forearm and 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  No current suicidal homicidal thoughts intent or plans reported  No overt hallucinations or other delusions reported   No signs or sxs of agranulocytosis or myocarditis or endocarditis and she is moving her bowels so far with no issues  Patient was again reminded to attend to ADLs skills and take showers at least every other day and attend more groups to keep up with her 25% expectation     She was not compliant with clozapine and only took 50 mg last night and was reminded that she needs to take it as prescribed   Current medications:    Current Facility-Administered Medications:     acetaminophen (TYLENOL) tablet 650 mg, 650 mg, Oral, Q6H PRN, Deedee Muir MD    albuterol (PROVENTIL HFA,VENTOLIN HFA) inhaler 2 puff, 2 puff, Inhalation, Q4H PRN, Deedee Muir MD, 2 puff at 10/11/19 0424    aluminum-magnesium hydroxide-simethicone (MYLANTA) 200-200-20 mg/5 mL oral suspension 15 mL, 15 mL, Oral, Q4H PRN, Deedee Muir MD    ammonium lactate (LAC-HYDRIN) 12 % lotion 1 application, 1 application, Topical, BID PRN, Deedee Muir MD    benzonatate (TESSALON PERLES) capsule 100 mg, 100 mg, Oral, TID PRN, Deedee Muir MD    benztropine (COGENTIN) injection 1 mg, 1 mg, Intramuscular, Q8H PRN, Deedee Muir MD    cloZAPine (CLOZARIL) tablet 25 mg, 25 mg, Oral, HS, Deedee Muir MD, 25 mg at 10/18/19 2123    cloZAPine (CLOZARIL) tablet 50 mg, 50 mg, Oral, BID, Deedee Muir MD, 50 mg at 10/20/19 2109    EPINEPHrine PF (ADRENALIN) 1 mg/mL injection 0 15 mg, 0 15 mg, Intramuscular, Once PRN, Deedee Muir MD    FLUoxetine (PROzac) capsule 20 mg, 20 mg, Oral, Daily, Deedee Muir MD, 20 mg at 10/21/19 0854    fluticasone-vilanterol (BREO ELLIPTA) 200-25 MCG/INH inhaler 1 puff, 1 puff, Inhalation, Daily, Deedee Muir MD, 1 puff at 10/21/19 0855    ketotifen (ZADITOR) 0 025 % ophthalmic solution 1 drop, 1 drop, Right Eye, BID PRN, Deedee Muir MD    levothyroxine tablet 125 mcg, 125 mcg, Oral, Early Morning, Deedee Muir MD, 125 mcg at 10/21/19 0526    magnesium hydroxide (MILK OF MAGNESIA) 400 mg/5 mL oral suspension 30 mL, 30 mL, Oral, Daily PRN, Deedee Muir MD    montelukast (SINGULAIR) tablet 10 mg, 10 mg, Oral, HS, Deedee Muir MD, 10 mg at 10/20/19 2110    OLANZapine (ZyPREXA) IM injection 5 mg, 5 mg, Intramuscular, Q8H PRN, Deedee Muir MD    OLANZapine (ZyPREXA) tablet 5 mg, 5 mg, Oral, Q8H PRN, Deedee Muir MD    ondansetron (ZOFRAN-ODT) dispersible tablet 4 mg, 4 mg, Oral, Q6H PRN, Deedee Muir MD    pantoprazole (PROTONIX) EC tablet 40 mg, 40 mg, Oral, Early Morning, [place] Goad Beryl Matt MD, 40 mg at 10/21/19 3649    polyethylene glycol (MIRALAX) packet 17 g, 17 g, Oral, Daily PRN, Vincent Olivares MD    polyvinyl alcohol (LIQUIFILM TEARS) 1 4 % ophthalmic solution 1 drop, 1 drop, Both Eyes, Q3H PRN, Vincent Olivares MD    theophylline (JEF-24) 24 hr capsule 200 mg, 200 mg, Oral, Daily, Vincent Olivares MD, 200 mg at 10/21/19 0854    tiotropium (SPIRIVA) capsule for inhaler 18 mcg, 18 mcg, Inhalation, Daily, Vincent Olivares MD, 18 mcg at 10/21/19 0854    traZODone (DESYREL) tablet 25 mg, 25 mg, Oral, HS PRN, Vincent Olivares MD  Justification if on more than two antipsychotics:  Only on his clozapine  Side effects if any:  None    Risks , benefits, side effects and precautions of medications discussed with patient and reminded patient to let us know any problems with medications     Objective:     Vital Signs:  Vitals:    10/20/19 0730 10/20/19 1703 10/20/19 1930 10/21/19 0700   BP: 99/61 111/75 116/67 113/66   BP Location: Right arm Right arm Right arm Left arm   Pulse: 82 88 102 97   Resp: 18 16 16 18   Temp: (!) 97 4 °F (36 3 °C) 98 1 °F (36 7 °C) (!) 97 2 °F (36 2 °C) 98 °F (36 7 °C)   TempSrc:  Temporal Temporal Temporal   SpO2:  96% 92% 94%   Weight: 67 7 kg (149 lb 3 2 oz)      Height:         Appearance:  age appropriate, casually dressed and overweight older than stated age   Behavior:  deviant, evasive and guarded and suspicious but with good eye contact   Speech:  normal pitch and normal volume but tends to become loud at times   Mood:  anxious and dysthymic   Affect:  mood-congruent   Thought Process:  goal directed and illogical slightly pressured but able to be redirected and talks as if in a court of low being stilted   Thought Content:  delusions  grandiose and somatic about not being able to breathe despite being on nasal oxygen and paranoid about people out to harm her and Atrovent inhaler tainteded with peanut oil  No current suicidal homicidal thoughts intent or plans reported    No phobias obsessions or compulsions reported   Perceptual Disturbances: None and does not appear responding to internal stimuli   Risk Potential: Tendency to not care for herself if she goes off treatment   Sensorium:  person, place, time/date, situation, day of week, month of year and time   Cognition:  grossly intact   Consciousness:  alert and awake    Attention: Intact concentration and attention span   Intellect: Considered to be at least of average intelligence   Insight:  limited in denial   Judgment: poor      Motor Activity: no abnormal movements         Recent Labs:  Results Reviewed     None          I/O Past 24 hours:  No intake/output data recorded  No intake/output data recorded  Assessment / Plan:     Schizoaffective disorder, bipolar [facility] ([facility]      Reason for continued inpatient care:  Because of underlying paranoia and refusal to go back to the personal care home and inability to care for herself on her own  Acceptance by patient:  Accepting  Clarisa Mcgregor in recovery:  About living in another personal care home once she feels better  Understanding of medications :  Has some understanding  Involved in reintegration process:  Adjusting to the unit  Trusting in relatoinship with psychiatrist:  Trusting    Recommended Treatment:    Medication changes:  1) no changes today and counseled to take meds as prescribed tatiana clozapine or else to reconsider stopping it   Non-pharmacological treatments  1) continue with  individual therapy group therapy, milieu therapy and occupational therapy and milieu therapy involving multidisciplinary team approach with psychotherapist, case management, nursing, peer support services, art therapist etc using recovery principles and psycho-education about accepting illness and need for treatment     3) encourage to attend to ADLs like taking showers and wearing clean clothes   4) Encourage to attend groups   Safety  1) Safety/communication plan established targeting dynamic risk factors above  Discharge Plan most likely back to the a:  Personal care boarding home with act services once her delusions are managed and mood becomes more stabilized and she becomes more receptive to treatment compliance    Counseling / Coordination of Care    Total floor / unit time spent today 15 minutes  Greater than 50% of total time was spent with the patient and / or family counseling and / or coordination of care  A description of the counseling / coordination of care  Patient's Rights, confidentiality and exceptions to confidentiality, use of automated medical record, [facility] staff access to medical record, and consent to treatment reviewed      Sindy Day MD

## 2019-10-21 NOTE — PLAN OF CARE

## 2019-10-21 NOTE — PLAN OF CARE
Problem: Alteration in Thoughts and Perception  Goal: Agree to be compliant with medication regime, as prescribed and report medication side effects  Description  Interventions:  - Offer appropriate PRN medication and supervise ingestion; conduct aims, as needed   Outcome: Progressing  Goal: Attend and participate in unit activities, including therapeutic, recreational, and educational groups  Description  Interventions:  - Provide therapeutic and educational activities daily, encourage attendance and participation, and document same in the medical record     CERTIFIED PEER SPECIALIST INTERVENTIONS:    Complete peer assessment with patient to assess their needs and identify their goals to complete while in the recovery program as well as once discharged into the community  Patient will complete WRAP Plan, Crisis Plan and 5 Life Domains  Patient will attend 50% of groups offered on the unit  Patient will complete a goal card weekly  Outcome: Progressing     Problem: Depression  Goal: Refrain from isolation  Description  Interventions:  - Develop a trusting relationship   - Encourage socialization   Outcome: Progressing     Problem: Alteration in Orientation  Goal: Interact with staff daily  Description  Interventions:  - Assess and re-assess patient's level of orientation  - Engage patient in 1 on 1 interactions, daily, for a minimum of 15 minutes   - Establish rapport/trust with patient   Outcome: Progressing     Problem: Alteration in Thoughts and Perception  Goal: Complete daily ADLs, including personal hygiene independently, as able  Description  Interventions:  - Observe, teach, and assist patient with ADLS  - Monitor and promote a balance of rest/activity, with adequate nutrition and elimination   Outcome: Not Progressing     Pt more visible on unit  Social with select peers and staff  Pt continues to be somatic   Pt continues to look for staff attention when she feels no attention is being given to her, and this is when the somatic statements become present  Pt talked to son today, requesting for him to come visit her before he goes oversees  Pt compliant with meds and meals other than HS clozaril, pt refused the extra 25mg  Pt repeating that she wants chips  No issues throughout shift  Will continue to monitor

## 2019-10-21 NOTE — PROGRESS NOTES
10/21/19 0900   Team Meeting   Meeting Type Daily Rounds   Team Members Present   Team Members Present Physician;Nurse;; Other (Discipline and Name)   Physician Team Member Dr Carolee Donald Team Member Tal Bingham RN   Care Management Team Member Brenda Ahn   Other (Discipline and Name) DIANA Phillips     Patient only took 50mg of Clozaril at [facility]  Still very somatic and complaining about her food, reported she feels she needs puree diet  Slept

## 2019-10-21 NOTE — PROGRESS NOTES
Pharmacy Clozapine Monitoring Progress Note     Chun Carrillo is receiving a total daily dose of 125 mg of clozapine divided as 50mg twice daily and 25mg at bedtime  Pharmacist Recommendations Based on Assessment and Plan (below):    1  Patient is Clozapine-REMS eligible, ANC was updated, with weekly monitoring frequency and the next [address] is due on 10/25/2019     2  Recommend prophylactic bowel regimen  3  Recommend repeat ECG for myocarditis monitoring and QTC prolongation due to antipsychotic  Assessment/Plan:    Phase of Treatment:     Current phase of treatment is initation/titration  Patient Clozapine REMS Eligibility:     Patient is eligible to receive clozapine and the [address] monitoring frequency is weekly per clozapine REMS  The patient's latest [address] value has been updated into the Clozapine-REMS program          ANC and Clozapine Level (if available)     The most recent [address] was   Lab Results   Component Value Date    NEUTROABS 2 70 10/20/2019    and the next lab is due on 10/25/19  Last Clozapine level (if available):    0   Lab Value Date/Time    CLOZAPINE 324 (L) 08/16/2019 1131     0   Lab Value Date/Time    NCLOZIP 207 08/16/2019 1131           Monitoring Parameters for Clozapine:     Clozapine Adverse Effect Suggested Monitoring Patient's Current Status    Agranulocytosis ANC baseline and then repeat weekly for first 6 months and every 2 weeks for the second 6 months  Maintenance after one year of therapy every month  The most recent [address] was   Lab Results   Component Value Date    NEUTROABS 2 70 10/20/2019       This ANC is considered normal range (ANC >/= 1500/mcL)  Continue current treatment and monitoring course     Respiratory Depression Please ensure patient is not on concomitant respiratory lowering medications such as benzodiazepines, sedative hypnotics (eg  zolpidem) This patient is not on respiratory depression lowering medications   Myocarditis Baseline and weekly EKG, CRP, BNP, and troponin  Weekly symptomatic complaints of fatigue, dyspnea, tachycardia, chest pain, palpitations, and fever for the first 4 weeks  Last EKG Results on  8/21/19 showed:  T wave abnormalities    Last QTC value was:  0   Lab Value Date/Time    QTCINT 427 08/21/2019 1133       Last BNP was: No results found for: NTBNP    Last Troponin was:  0   Lab Value Date/Time    TROPONINI <0 01 05/01/2019 1318    TROPONINI <0 04 05/10/2015 1948        Orthostatic hypotension/  bradycardia Blood pressure and vital signs      Monitor blood pressure and orthostatic hypotension at least twice daily upon initiation and continue to follow at least once daily afterwards  Patient's last recorded vital signs:       /78 (BP Location: Left arm)   Pulse 99   Temp 98 6 °F (37 °C) (Temporal)   Resp 18   Ht 5' 4" (1 626 m)   Wt 67 7 kg (149 lb 3 2 oz)   SpO2 97%   BMI 25 61 kg/m²             Constipation (bowel obstruction due to high anticholinergic clozapine burden) Assess at baseline and weekly during first four months of therapy  Docusate 100mg BID and Miralax 17 grams daily recommended initially  Prophylactic bowel regimen not ordered and it is recommended to order a standing bowel regimen to reduce risk of bowel obstruction associated with clozapine therapy  Sialorrhea Assess at baseline and weekly during first four months of therapy  May be managed using sublingual anticholinergic preparations (atropine 1% eye drops)  Patient is not experiencing sialorrhea  This will continue to be monitored  Please note that per current REMS protocol the provider can submit a treatment rationale that justifies clozapine treatment even if patient parameters are outside of REMS recommendations  The Clozapine REMS is an FDA-mandated program implemented by the manufacturers of clozapine  ([employer]  com/CpmgClozapineUI/home  u)            Pharmacy will continue to follow patient with team, thank you    Electronically signed by: Bri Tena, PharmD, [address], Clinical Pharmacist - Psychiatry

## 2019-10-21 NOTE — PROGRESS NOTES
Patient Name: Madison Miner  Patient MRN: [id number]  Date: 10/21/19  Service: [facility]    Subjective   Patient still somatic  Focused on her "gi issues" and requesting her diet be downgraded to pureed  She is also asking for Glucerna stating her " blood sugar is low " She does not have upper dentures at home  Discussed with nursing team - patient had no trouble swallowing pills with water  Vitals  Blood pressure 113/66, pulse 97, temperature 98 °F (36 7 °C), temperature source Temporal, resp  rate 18, height 5' 4" (1 626 m), weight 67 7 kg (149 lb 3 2 oz), SpO2 94 %, not currently breastfeeding  Physical Exam:     General Appearance:    Awake, alert, oriented x3, no distress, well developed, well    Nourished  Seen sitting in hallway chair  Head:    Normocephalic without obvious abnormality   Eyes:    PERRL, conjunctiva/corneas clear, EOM's intact        Neck:   Supple, no adenopathy   Throat:   Mucous membranes moist   Lungs:     Clear to auscultation bilaterally, no wheezing or rhonchi   Heart:    Regular rate and rhythm, S1 and S2 normal, no murmur   Abdomen:     Soft, non-tender, non-distended  bowel sounds active  No    masses, rebound or guarding  Extremities:   Extremities normal  No clubbing, cyanosis or edema   Psych  Derm:   Anxious affect    No jaundice   Neurologic:   CNII-XII grossly intact   Speech intact         Laboratory Studies  Results from last 7 days   Lab Units 10/20/19  0545   WBC Thousand/uL 6 40   HEMOGLOBIN g/dL 14 7   HEMATOCRIT % 44 7   PLATELETS Thousands/uL 223             Imaging and Other Studies      Inhouse Medications     Current Facility-Administered Medications:     acetaminophen (TYLENOL) tablet 650 mg, 650 mg, Oral, Q6H PRN    albuterol (PROVENTIL HFA,VENTOLIN HFA) inhaler 2 puff, 2 puff, Inhalation, Q4H PRN, 2 puff at 10/11/19 0424    aluminum-magnesium hydroxide-simethicone (MYLANTA) 200-200-20 mg/5 mL oral suspension 15 mL, 15 mL, Oral, Q4H PRN    ammonium lactate (LAC-HYDRIN) 12 % lotion 1 application, 1 application, Topical, BID PRN    benzonatate (TESSALON PERLES) capsule 100 mg, 100 mg, Oral, TID PRN    benztropine (COGENTIN) injection 1 mg, 1 mg, Intramuscular, Q8H PRN    cloZAPine (CLOZARIL) tablet 25 mg, 25 mg, Oral, HS, 25 mg at 10/18/19 2123    cloZAPine (CLOZARIL) tablet 50 mg, 50 mg, Oral, BID, 50 mg at 10/20/19 2109    EPINEPHrine PF (ADRENALIN) 1 mg/mL injection 0 15 mg, 0 15 mg, Intramuscular, Once PRN    FLUoxetine (PROzac) capsule 20 mg, 20 mg, Oral, Daily, 20 mg at 10/21/19 0854    fluticasone-vilanterol (BREO ELLIPTA) 200-25 MCG/INH inhaler 1 puff, 1 puff, Inhalation, Daily, 1 puff at 10/21/19 0855    ketotifen (ZADITOR) 0 025 % ophthalmic solution 1 drop, 1 drop, Right Eye, BID PRN    levothyroxine tablet 125 mcg, 125 mcg, Oral, Early Morning, 125 mcg at 10/21/19 0526    magnesium hydroxide (MILK OF MAGNESIA) 400 mg/5 mL oral suspension 30 mL, 30 mL, Oral, Daily PRN    montelukast (SINGULAIR) tablet 10 mg, 10 mg, Oral, HS, 10 mg at 10/20/19 2110    OLANZapine (ZyPREXA) IM injection 5 mg, 5 mg, Intramuscular, Q8H PRN    OLANZapine (ZyPREXA) tablet 5 mg, 5 mg, Oral, Q8H PRN    ondansetron (ZOFRAN-ODT) dispersible tablet 4 mg, 4 mg, Oral, Q6H PRN    pantoprazole (PROTONIX) EC tablet 40 mg, 40 mg, Oral, Early Morning, 40 mg at 10/21/19 0526    polyethylene glycol (MIRALAX) packet 17 g, 17 g, Oral, Daily PRN    polyvinyl alcohol (LIQUIFILM TEARS) 1 4 % ophthalmic solution 1 drop, 1 drop, Both Eyes, Q3H PRN    theophylline (JEF-24) 24 hr capsule 200 mg, 200 mg, Oral, Daily, 200 mg at 10/21/19 0854    tiotropium (SPIRIVA) capsule for inhaler 18 mcg, 18 mcg, Inhalation, Daily, 18 mcg at 10/21/19 0854    traZODone (DESYREL) tablet 25 mg, 25 mg, Oral, HS PRN      Assessment/Plan:    1   Dysphagia, s/p 10/14/2019 EGD with ([address]) dilatation   EGD findings of large hiatal hernia   Subsequent esophagram ordered, limited study however very large type I hiatal hernia with kinking of the distal esophagus and proximal dilation reported   Continue daily Protonix     continue mechanical soft diet/small meals   If dilation does not provide long-term benefit,  would suggest outpatient esophageal motility study and possible surgical correction if normal motility noted  Surgical correction was not discussed with the patient given her anxiety and her somatic preoccupation  Continue current management            Bienvenido Robledo PA-C

## 2019-10-21 NOTE — PROGRESS NOTES
Patient declined      10/17/19 1100   Activity/Group Checklist   Group   (IMR/!2 Spiritual Principals of Recovery )   Attendance Did not attend   Attendance Duration (min) 46-60   Affect/Mood IRMA

## 2019-10-21 NOTE — PLAN OF CARE
Problem: Alteration in Thoughts and Perception  Goal: Verbalize thoughts and feelings  Description  Interventions:  - Promote a nonjudgmental and trusting relationship with the patient through active listening and therapeutic communication  - Assess patient's level of functioning, behavior and potential for risk  - Engage patient in 1 on 1 interactions for a minimum of 15 minutes each session  - Encourage patient to express fears, feelings, frustrations, and discuss symptoms    - [place] patient to reality, help patient recognize reality-based thinking   - Administer medications as ordered and assess for potential side effects  - Provide the patient education related to the signs and symptoms of the illness and desired effects of prescribed medications  Outcome: Progressing  Goal: Agree to be compliant with medication regime, as prescribed and report medication side effects  Description  Interventions:  - Offer appropriate PRN medication and supervise ingestion; conduct aims, as needed   Outcome: Progressing     Problem: Ineffective Coping  Goal: Patient/Family verbalizes awareness of resources  Outcome: Progressing  Goal: Understands least restrictive measures  Description  Interventions:  - Utilize least restrictive behavior  Outcome: Progressing     Problem: Risk for Self Injury/Neglect  Goal: Verbalize thoughts and feelings  Description  Interventions:  - Assess and re-assess patient's lethality and potential for self-injury  - Engage patient in 1:1 interactions, daily, for a minimum of 15 minutes  - Encourage patient to express feelings, fears, frustrations, hopes  - Establish rapport/trust with patient   Outcome: Progressing  Goal: Refrain from harming self  Description  Interventions:  - Monitor patient closely, per order  - Develop a trusting relationship  - Supervise medication ingestion, monitor effects and side effects   Outcome: Progressing     Problem: Depression  Goal: Verbalize thoughts and feelings  Description  Interventions:  - Assess and re-assess patient's level of risk   - Engage patient in 1:1 interactions, daily, for a minimum of 15 minutes   - Encourage patient to express feelings, fears, frustrations, hopes   Outcome: Progressing     Problem: Anxiety  Goal: Anxiety is at manageable level  Description  Interventions:  - Assess and monitor patient's anxiety level  - Monitor for signs and symptoms of anxiety both physical and emotional (heart palpitations, chest pain, shortness of breath, headaches, nausea, feeling jumpy, restlessness, irritable, apprehensive)  - Collaborate with interdisciplinary team and initiate plan and interventions as ordered    - [place] patient to unit/surroundings  - Explain treatment plan  - Encourage participation in care  - Encourage verbalization of concerns/fears  - Identify coping mechanisms  - Assist in developing anxiety-reducing skills  - Administer/offer alternative therapies  - Limit or eliminate stimulants  Outcome: Progressing     Problem: Alteration in Orientation  Goal: Interact with staff daily  Description  Interventions:  - Assess and re-assess patient's level of orientation  - Engage patient in 1 on 1 interactions, daily, for a minimum of 15 minutes   - Establish rapport/trust with patient   Outcome: Progressing     Problem: PAIN - ADULT  Goal: Verbalizes/displays adequate comfort level or baseline comfort level  Description  Interventions:  - Encourage patient to monitor pain and request assistance  - Assess pain using appropriate pain scale  - Administer analgesics based on type and severity of pain and evaluate response  - Implement non-pharmacological measures as appropriate and evaluate response  - Consider cultural and social influences on pain and pain management  - Notify physician/advanced practitioner if interventions unsuccessful or patient reports new pain  Outcome: Progressing     Problem: SAFETY ADULT  Goal: Patient will remain free of falls  Description  INTERVENTIONS:  - Assess patient frequently for physical needs  -  Identify cognitive and physical deficits and behaviors that affect risk of falls  -  [place] fall precautions as indicated by assessment   - Educate patient/family on patient safety including physical limitations  - Instruct patient to call for assistance with activity based on assessment  - Modify environment to reduce risk of injury  - Consider OT/PT consult to assist with strengthening/mobility  Outcome: Progressing     Problem: RESPIRATORY - ADULT  Goal: Achieves optimal ventilation and oxygenation  Description  INTERVENTIONS:  - Assess for changes in respiratory status  - Assess for changes in mentation and behavior  - Position to facilitate oxygenation and minimize respiratory effort  - Oxygen administration by appropriate delivery method based on oxygen saturation (per order) or ABGs  - Initiate smoking cessation education as indicated  - Encourage broncho-pulmonary hygiene including cough, deep breathe, Incentive Spirometry  - Assess the need for suctioning and aspirate as needed  - Assess and instruct to report SOB or any respiratory difficulty  - Respiratory Therapy support as indicated  Outcome: Progressing     Problem: Alteration in Thoughts and Perception  Goal: Attend and participate in unit activities, including therapeutic, recreational, and educational groups  Description  Interventions:  - Provide therapeutic and educational activities daily, encourage attendance and participation, and document same in the medical record     CERTIFIED PEER SPECIALIST INTERVENTIONS:    Complete peer assessment with patient to assess their needs and identify their goals to complete while in the recovery program as well as once discharged into the community  Patient will complete WRAP Plan, Crisis Plan and [facility] [employer]  Patient will attend 50% of groups offered on the unit  Patient will complete a goal card weekly  Outcome: Not Progressing  Goal: Recognize dysfunctional thoughts, communicate reality-based thoughts at the time of discharge  Description  Interventions:  - Provide medication and psycho-education to assist patient in compliance and developing insight into his/her illness   Outcome: Not Progressing  Goal: Complete daily ADLs, including personal hygiene independently, as able  Description  Interventions:  - Observe, teach, and assist patient with ADLS  - Monitor and promote a balance of rest/activity, with adequate nutrition and elimination   Outcome: Not Progressing     Problem: Ineffective Coping  Goal: Identifies ineffective coping skills  Outcome: Not Progressing  Goal: Identifies healthy coping skills  Outcome: Not Progressing  Goal: Demonstrates healthy coping skills  Outcome: Not Progressing  Goal: Participates in unit activities  Description  Interventions:  - Provide therapeutic environment   - Provide required programming   - Redirect inappropriate behaviors   Outcome: Not Progressing  Goal: Patient/Family participate in treatment and DC plans  Description  Interventions:  - Provide therapeutic environment  Outcome: Not Progressing     Problem: Risk for Self Injury/Neglect  Goal: Treatment Goal: Remain safe during length of stay, learn and adopt new coping skills, and be free of self-injurious ideation, impulses and acts at the time of discharge  Outcome: Not Progressing  Goal: Attend and participate in unit activities, including therapeutic, recreational, and educational groups  Description  Interventions:  - Provide therapeutic and educational activities daily, encourage attendance and participation, and document same in the medical record  - Obtain collateral information, encourage visitation and family involvement in care   Outcome: Not Progressing  Goal: Recognize maladaptive responses and adopt new coping mechanisms  Outcome: Not Progressing  Goal: Complete daily ADLs, including personal hygiene independently, as able  Description  Interventions:  - Observe, teach, and assist patient with ADLS  - Monitor and promote a balance of rest/activity, with adequate nutrition and elimination  Outcome: Not Progressing     Problem: Depression  Goal: Treatment Goal: Demonstrate behavioral control of depressive symptoms, verbalize feelings of improved mood/affect, and adopt new coping skills prior to discharge  Outcome: Not Progressing  Goal: Refrain from isolation  Description  Interventions:  - Develop a trusting relationship   - Encourage socialization   Outcome: Not Progressing  Goal: Refrain from self-neglect  Outcome: Not Progressing     Problem: Alteration in Orientation  Goal: Cooperate with recommended testing/procedures  Description  Interventions:  - Determine need for ancillary testing  - Observe for mental status changes  - Implement falls/precaution protocol   Outcome: Not Progressing     Problem: Nutrition/Hydration-ADULT  Goal: Nutrient/Hydration intake appropriate for improving, restoring or maintaining nutritional needs  Description  Monitor and assess patient's nutrition/hydration status for malnutrition  Collaborate with interdisciplinary team and initiate plan and interventions as ordered  Monitor patient's weight and dietary intake as ordered or per policy  Utilize nutrition screening tool and intervene as necessary  Determine patient's food preferences and provide high-protein, high-caloric foods as appropriate       INTERVENTIONS:  - Monitor oral intake, urinary output, labs, and treatment plans  - Assess nutrition and hydration status and recommend course of action  - Evaluate amount of meals eaten  - Assist patient with eating if necessary   - Allow adequate time for meals  - Recommend/ encourage appropriate diets, oral nutritional supplements, and vitamin/mineral supplements  - Order, calculate, and assess calorie counts as needed  - Recommend, monitor, and adjust tube feedings and TPN/PPN based on assessed needs  - Assess need for intravenous fluids  - Provide specific nutrition/hydration education as appropriate  - Include patient/family/caregiver in decisions related to nutrition  Outcome: Not Progressing   The patient has been mostly isolative to her bed, coming out only for meds, meals, and treatment team  Did not attend any groups even after staff prompting and encouragement  Gives many excuses why she cannot participate in unit programming  Attention seeking with many somatic complaints, frequently expressing her concerns in regards to her diet and her inability to swallow but does not comply with staff or doctor recommendations and continues to go from staff member to staff member with the same complaints  Continues to lay on right side in bed, refuses to sit up, and did not use her incentive spirometer  C/O "wax in my left ear is pressing against my youtube and is throwing my balance off"  No other issues noted  Continue to monitor

## 2019-10-21 NOTE — PROGRESS NOTES
Progress Note - Rosana Lindsay 1955, 60 y o  female MRN: [id number]    [facility]-31 Encounter: [id number]    Primary Care Provider: Christ Munoz MD   Date and time admitted to hospital: 7/23/2019  5:30 PM        * Schizoaffective disorder, bipolar type [facility])  Assessment & Plan  Psychiatry Progress Note    Patient did improve with her group attendance going up to 29% last week  She is still reluctant to take showers but does after lot of reminders by staff  She continues to verbalize that she does not feel quite safe on the unit and become psychosomatic claiming that she cannot breathe or swallow even though she does appear to breathe fine while observed and swallowing study was negative and she is still demandng to be on chopped diet  She is still  suspicious about certain staff who gives her medications like her inhalers and the spirometer off and on and now asking to see respiratory for her portable oxygen which she wants to use when she gets out of the unit even though she has not used it here at all    She was again reminded that  if she maintains her 25% group attendance and attends to her ADLs skills,  we can always look into discharging her back to [facility] [place] [facility]  She continues to present with stilted speech being too formal as if talking in a court of law  She does not admit to any overt hallucinations or paranoia but still appears to harbor some covert  paranoia based on her demeanor and interaction on the unit and admits she does have "psychosomatic sxs"     She no longer believes that there is a micro chip on her right forearm and 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  No current suicidal homicidal thoughts intent or plans reported  No overt hallucinations or other delusions reported   No signs or sxs of agranulocytosis or myocarditis or endocarditis and she is moving her bowels so far with no issues    Patient was again reminded to attend to ADLs skills and take showers at least every other day and attend more groups to keep up with her 25% expectation     She was not compliant with clozapine and only took 50 mg last night and was reminded that she needs to take it as prescribed   Current medications:    Current Facility-Administered Medications:     acetaminophen (TYLENOL) tablet 650 mg, 650 mg, Oral, Q6H PRN, Chichi Lockett MD    albuterol (PROVENTIL HFA,VENTOLIN HFA) inhaler 2 puff, 2 puff, Inhalation, Q4H PRN, Chichi Lockett MD, 2 puff at 10/11/19 0424    aluminum-magnesium hydroxide-simethicone (MYLANTA) 200-200-20 mg/5 mL oral suspension 15 mL, 15 mL, Oral, Q4H PRN, Chichi Lockett MD    ammonium lactate (LAC-HYDRIN) 12 % lotion 1 application, 1 application, Topical, BID PRN, Chichi Lockett MD    benzonatate (TESSALON PERLES) capsule 100 mg, 100 mg, Oral, TID PRN, Chichi Lockett MD    benztropine (COGENTIN) injection 1 mg, 1 mg, Intramuscular, Q8H PRN, Chichi Lockett MD    cloZAPine (CLOZARIL) tablet 25 mg, 25 mg, Oral, HS, Chichi Lockett MD, 25 mg at 10/18/19 2123    cloZAPine (CLOZARIL) tablet 50 mg, 50 mg, Oral, BID, Chichi Lockett MD, 50 mg at 10/20/19 2109    EPINEPHrine PF (ADRENALIN) 1 mg/mL injection 0 15 mg, 0 15 mg, Intramuscular, Once PRN, Chichi Lockett MD    FLUoxetine (PROzac) capsule 20 mg, 20 mg, Oral, Daily, Chichi Lockett MD, 20 mg at 10/21/19 0854    fluticasone-vilanterol (BREO ELLIPTA) 200-25 MCG/INH inhaler 1 puff, 1 puff, Inhalation, Daily, Chichi Lockett MD, 1 puff at 10/21/19 0855    ketotifen (ZADITOR) 0 025 % ophthalmic solution 1 drop, 1 drop, Right Eye, BID PRN, Chichi Lockett MD    levothyroxine tablet 125 mcg, 125 mcg, Oral, Early Morning, Chichi Lockett MD, 125 mcg at 10/21/19 0526    magnesium hydroxide (MILK OF MAGNESIA) 400 mg/5 mL oral suspension 30 mL, 30 mL, Oral, Daily PRN, Chcihi Lockett MD    montelukast (SINGULAIR) tablet 10 mg, 10 mg, Oral, HS, Chichi Lockett MD, 10 mg at 10/20/19 2110    OLANZapine (ZyPREXA) IM injection 5 mg, 5 mg, Intramuscular, Q8H PRN, Isidoro Gonzalez MD    OLANZapine (ZyPREXA) tablet 5 mg, 5 mg, Oral, Q8H PRN, Isidoro Gonzalez MD    ondansetron (ZOFRAN-ODT) dispersible tablet 4 mg, 4 mg, Oral, Q6H PRN, Isidoro Gonzalez MD    pantoprazole (PROTONIX) EC tablet 40 mg, 40 mg, Oral, Early Morning, Isidoro Gonzalez MD, 40 mg at 10/21/19 0526    polyethylene glycol (MIRALAX) packet 17 g, 17 g, Oral, Daily PRN, Isidoro Gonzalez MD    polyvinyl alcohol (LIQUIFILM TEARS) 1 4 % ophthalmic solution 1 drop, 1 drop, Both Eyes, Q3H PRN, Isidoro Gonzalez MD    theophylline (JEF-24) 24 hr capsule 200 mg, 200 mg, Oral, Daily, Isidoro Gonzalez MD, 200 mg at 10/21/19 0854    tiotropium (SPIRIVA) capsule for inhaler 18 mcg, 18 mcg, Inhalation, Daily, Isidoro Gonzalez MD, 18 mcg at 10/21/19 0854    traZODone (DESYREL) tablet 25 mg, 25 mg, Oral, HS PRN, Isidoro Gonzalez MD  Justification if on more than two antipsychotics:  Only on his clozapine  Side effects if any:  None    Risks , benefits, side effects and precautions of medications discussed with patient and reminded patient to let us know any problems with medications     Objective:     Vital Signs:  Vitals:    10/20/19 0730 10/20/19 1703 10/20/19 1930 10/21/19 0700   BP: 99/61 111/75 116/67 113/66   BP Location: Right arm Right arm Right arm Left arm   Pulse: 82 88 102 97   Resp: 18 16 16 18   Temp: (!) 97 4 °F (36 3 °C) 98 1 °F (36 7 °C) (!) 97 2 °F (36 2 °C) 98 °F (36 7 °C)   TempSrc:  Temporal Temporal Temporal   SpO2:  96% 92% 94%   Weight: 67 7 kg (149 lb 3 2 oz)      Height:         Appearance:  age appropriate, casually dressed and overweight older than stated age   Behavior:  deviant, evasive and guarded and suspicious but with good eye contact   Speech:  normal pitch and normal volume but tends to become loud at times   Mood:  anxious and dysthymic   Affect:  mood-congruent   Thought Process:  goal directed and illogical slightly pressured but able to be redirected and talks as if in a court of low being stilted   Thought Content:  delusions  grandiose and somatic about not being able to breathe despite being on nasal oxygen and paranoid about people out to harm her and Atrovent inhaler tainteded with peanut oil  No current suicidal homicidal thoughts intent or plans reported  No phobias obsessions or compulsions reported   Perceptual Disturbances: None and does not appear responding to internal stimuli   Risk Potential: Tendency to not care for herself if she goes off treatment   Sensorium:  person, place, time/date, situation, day of week, month of year and time   Cognition:  grossly intact   Consciousness:  alert and awake    Attention: Intact concentration and attention span   Intellect: Considered to be at least of average intelligence   Insight:  limited in denial   Judgment: poor      Motor Activity: no abnormal movements         Recent Labs:  Results Reviewed     None          I/O Past 24 hours:  No intake/output data recorded  No intake/output data recorded          Assessment / Plan:     Schizoaffective disorder, bipolar type [facility]      Reason for continued inpatient care:  Because of underlying paranoia and refusal to go back to the personal care home and inability to care for herself on her own  Acceptance by patient:  Accepting  Audelia Arthur in recovery:  About living in another personal care home once she feels better  Understanding of medications :  Has some understanding  Involved in reintegration process:  Adjusting to the unit  Trusting in relatoinship with psychiatrist:  Trusting    Recommended Treatment:    Medication changes:  1) no changes today and counseled to take meds as prescribed tatiana clozapine or else to reconsider stopping it   Non-pharmacological treatments  1) continue with  individual therapy group therapy, milieu therapy and occupational therapy and milieu therapy involving multidisciplinary team approach with psychotherapist, case management, nursing, peer support services, art therapist etc using recovery principles and psycho-education about accepting illness and need for treatment   3) encourage to attend to ADLs like taking showers and wearing clean clothes   4) Encourage to attend groups   Safety  1) Safety/communication plan established targeting dynamic risk factors above  Discharge Plan most likely back to the a:  Personal care boarding home with [facility] services once her delusions are managed and mood becomes more stabilized and she becomes more receptive to treatment compliance    Counseling / Coordination of Care    Total floor / unit time spent today 15 minutes  Greater than 50% of total time was spent with the patient and / or family counseling and / or coordination of care  A description of the counseling / coordination of care  Patient's Rights, confidentiality and exceptions to confidentiality, use of automated medical record, [facility]eline staff access to medical record, and consent to treatment reviewed      Meldon Curling, MD

## 2019-10-21 NOTE — PLAN OF CARE
Problem: PAIN - ADULT  Goal: Verbalizes/displays adequate comfort level or baseline comfort level  Description  Interventions:  - Encourage patient to monitor pain and request assistance  - Assess pain using appropriate pain scale  - Administer analgesics based on type and severity of pain and evaluate response  - Implement non-pharmacological measures as appropriate and evaluate response  - Consider cultural and social influences on pain and pain management  - Notify physician/advanced practitioner if interventions unsuccessful or patient reports new pain  Outcome: Progressing     Problem: SAFETY ADULT  Goal: Patient will remain free of falls  Description  INTERVENTIONS:  - Assess patient frequently for physical needs  -  Identify cognitive and physical deficits and behaviors that affect risk of falls    -  [place] fall precautions as indicated by assessment   - Educate patient/family on patient safety including physical limitations  - Instruct patient to call for assistance with activity based on assessment  - Modify environment to reduce risk of injury  - Consider OT/PT consult to assist with strengthening/mobility  Outcome: Progressing     Problem: RESPIRATORY - ADULT  Goal: Achieves optimal ventilation and oxygenation  Description  INTERVENTIONS:  - Assess for changes in respiratory status  - Assess for changes in mentation and behavior  - Position to facilitate oxygenation and minimize respiratory effort  - Oxygen administration by appropriate delivery method based on oxygen saturation (per order) or ABGs  - Initiate smoking cessation education as indicated  - Encourage broncho-pulmonary hygiene including cough, deep breathe, Incentive Spirometry  - Assess the need for suctioning and aspirate as needed  - Assess and instruct to report SOB or any respiratory difficulty  - Respiratory Therapy support as indicated  Outcome: Progressing     Problem: SLEEP DISTURBANCE  Goal: Will exhibit normal sleeping pattern  Description  Interventions:  -  Assess the patients sleep pattern, noting recent changes  - Administer medication as ordered  - Decrease environmental stimuli, including noise, as appropriate during the night  - Encourage the patient to actively participate in unit groups and or exercise during the day to enhance ability to achieve adequate sleep at night  - Assess the patient, in the morning, encouraging a description of sleep experience  Outcome: Arabella Blackwell maintained on ongoing SAFE precaution without incident on this shift  Laying in bed with eyes closed, breath even and unlabored  Q 15 minutes rounding continues  No indication of pain or discomfort noted  No respiratory distress noted  On 1L O2 with humidifier via NC while asleep  No behavioral noted  Will continue to monitor

## 2019-10-22 PROCEDURE — 99231 SBSQ HOSP IP/OBS SF/LOW 25: CPT | Performed by: PSYCHIATRY & NEUROLOGY

## 2019-10-22 RX ADMIN — PANTOPRAZOLE SODIUM 40 MG: 40 TABLET, DELAYED RELEASE ORAL at 06:20

## 2019-10-22 RX ADMIN — CLOZAPINE 50 MG: 25 TABLET ORAL at 21:00

## 2019-10-22 RX ADMIN — TIOTROPIUM BROMIDE 18 MCG: 18 CAPSULE ORAL; RESPIRATORY (INHALATION) at 08:53

## 2019-10-22 RX ADMIN — FLUOXETINE 20 MG: 20 CAPSULE ORAL at 08:53

## 2019-10-22 RX ADMIN — CLOZAPINE 50 MG: 25 TABLET ORAL at 16:56

## 2019-10-22 RX ADMIN — FLUTICASONE FUROATE AND VILANTEROL TRIFENATATE 1 PUFF: 200; 25 POWDER RESPIRATORY (INHALATION) at 08:53

## 2019-10-22 RX ADMIN — MONTELUKAST SODIUM 10 MG: 10 TABLET, COATED ORAL at 21:02

## 2019-10-22 RX ADMIN — THEOPHYLLINE ANHYDROUS 200 MG: 200 CAPSULE, EXTENDED RELEASE ORAL at 08:53

## 2019-10-22 RX ADMIN — CLOZAPINE 25 MG: 25 TABLET ORAL at 21:03

## 2019-10-22 RX ADMIN — LEVOTHYROXINE SODIUM 125 MCG: 125 TABLET ORAL at 06:20

## 2019-10-22 NOTE — PLAN OF CARE
Problem: Alteration in Thoughts and Perception  Goal: Verbalize thoughts and feelings  Description  Interventions:  - Promote a nonjudgmental and trusting relationship with the patient through active listening and therapeutic communication  - Assess patient's level of functioning, behavior and potential for risk  - Engage patient in 1 on 1 interactions for a minimum of 15 minutes each session  - Encourage patient to express fears, feelings, frustrations, and discuss symptoms    - [place] patient to reality, help patient recognize reality-based thinking   - Administer medications as ordered and assess for potential side effects  - Provide the patient education related to the signs and symptoms of the illness and desired effects of prescribed medications  Outcome: Progressing  Goal: Agree to be compliant with medication regime, as prescribed and report medication side effects  Description  Interventions:  - Offer appropriate PRN medication and supervise ingestion; conduct aims, as needed   Outcome: Progressing     Problem: Risk for Self Injury/Neglect  Goal: Refrain from harming self  Description  Interventions:  - Monitor patient closely, per order  - Develop a trusting relationship  - Supervise medication ingestion, monitor effects and side effects   Outcome: Progressing     Problem: Alteration in Orientation  Goal: Interact with staff daily  Description  Interventions:  - Assess and re-assess patient's level of orientation  - Engage patient in 1 on 1 interactions, daily, for a minimum of 15 minutes   - Establish rapport/trust with patient   Outcome: Progressing     Problem: SAFETY ADULT  Goal: Patient will remain free of falls  Description  INTERVENTIONS:  - Assess patient frequently for physical needs  -  Identify cognitive and physical deficits and behaviors that affect risk of falls    -  [place] fall precautions as indicated by assessment   - Educate patient/family on patient safety including physical limitations  - Instruct patient to call for assistance with activity based on assessment  - Modify environment to reduce risk of injury  - Consider OT/PT consult to assist with strengthening/mobility  Outcome: Progressing     Problem: RESPIRATORY - ADULT  Goal: Achieves optimal ventilation and oxygenation  Description  INTERVENTIONS:  - Assess for changes in respiratory status  - Assess for changes in mentation and behavior  - Position to facilitate oxygenation and minimize respiratory effort  - Oxygen administration by appropriate delivery method based on oxygen saturation (per order) or ABGs  - Initiate smoking cessation education as indicated  - Encourage broncho-pulmonary hygiene including cough, deep breathe, Incentive Spirometry  - Assess the need for suctioning and aspirate as needed  - Assess and instruct to report SOB or any respiratory difficulty  - Respiratory Therapy support as indicated  Outcome: Progressing     Problem: Alteration in Thoughts and Perception  Goal: Attend and participate in unit activities, including therapeutic, recreational, and educational groups  Description  Interventions:  - Provide therapeutic and educational activities daily, encourage attendance and participation, and document same in the medical record     CERTIFIED PEER SPECIALIST INTERVENTIONS:    Complete peer assessment with patient to assess their needs and identify their goals to complete while in the recovery program as well as once discharged into the community  Patient will complete WRAP Plan, Crisis Plan and [employer]  Patient will attend 50% of groups offered on the unit  Patient will complete a goal card weekly      Outcome: Not Progressing  Goal: Complete daily ADLs, including personal hygiene independently, as able  Description  Interventions:  - Observe, teach, and assist patient with ADLS  - Monitor and promote a balance of rest/activity, with adequate nutrition and elimination   Outcome: Not Prince Delatorre has been in her room most of the shift  Either laying or sitting on the bed  She did come out a little more than usual  Social with staff and select peers  Good eye contact  Not very somatic this shift  No complaint of shortness of breath  Took pills whole with water without an issue  Had juice, small ensure, small milk and ice cream for supper  Did not get ground/mechanical soft food  Correct meal was brought to her and she refused to eat it  "This is not appetizing at all  I lost my appetite " Did not attend evening group  Dr Cesar Jim was here to see her  Order written to give juice when requested up to 3 times a day  Bp was retaken manually when machine gave low reading  Manually it was 98/64  Ate snack and then took HS medication  Oxygen saturation was  93% prior to O2 1 liter humidified applied via nasal cannula  Continue to monitor  Precautions maintained

## 2019-10-22 NOTE — ASSESSMENT & PLAN NOTE
Psychiatry Progress Note    Patient continues to become psychosomatic claiming that she cannot breathe or swallow even though she does appear to breathe fine while observed and swallowing study was negative and she is still demandng to be on chopped diet  She was evaluated by medical resident yesterday was ordered orange juice 3 times a day but was demanding to get more orange juice claiming she was hypoglycemic last night  She is still  suspicious about certain staff who gives her medications like her inhalers and the spirometer off and on and now asking to see respiratory for her portable oxygen which she wants to use when she gets out of the unit even though she has not used it here at all  She is also coming up with several excuses for not taking showers consistently  She was again reminded that  if she maintains her 25% group attendance and attends to her ADLs skills,  we can always look into discharging her back [facility] [place] 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  No current suicidal homicidal thoughts intent or plans reported  No overt hallucinations or other delusions reported   No signs or sxs of agranulocytosis or myocarditis or endocarditis and she is moving her bowels so far with no issues  Patient was again reminded to attend to ADLs skills and take showers at least every other day and attend more groups to keep up with her 25% expectation     She is now compliant with the clozapine as prescribed   Current medications:    Current Facility-Administered Medications:     acetaminophen (TYLENOL) tablet 650 mg, 650 mg, Oral, Q6H PRN, lAirio Frazier MD    albuterol (PROVENTIL HFA,VENTOLIN HFA) inhaler 2 puff, 2 puff, Inhalation, Q4H PRN, Alirio Frazier MD, 2 puff at 10/11/19 0424    aluminum-magnesium hydroxide-simethicone (MYLANTA) 200-200-20 mg/5 mL oral suspension 15 mL, 15 mL, Oral, Q4H PRN, Alirio Frazier MD    ammonium lactate (LAC-HYDRIN) 12 % lotion 1 application, 1 application, Topical, BID PRN, Alirio Frazier MD    benzonatate (TESSALON PERLES) capsule 100 mg, 100 mg, Oral, TID PRN, Alirio Frazier MD    benztropine (COGENTIN) injection 1 mg, 1 mg, Intramuscular, Q8H PRN, Alirio Frazier MD    cloZAPine (CLOZARIL) tablet 25 mg, 25 mg, Oral, HS, Alirio Frazier MD, 25 mg at 10/21/19 2124    cloZAPine (CLOZARIL) tablet 50 mg, 50 mg, Oral, BID, Alirio Frazier MD, 50 mg at 10/21/19 2123    EPINEPHrine PF (ADRENALIN) 1 mg/mL injection 0 15 mg, 0 15 mg, Intramuscular, Once PRN, Alirio Frazier MD    FLUoxetine (PROzac) capsule 20 mg, 20 mg, Oral, Daily, Alirio Frazier MD, 20 mg at 10/21/19 0854    fluticasone-vilanterol (BREO ELLIPTA) 200-25 MCG/INH inhaler 1 puff, 1 puff, Inhalation, Daily, Alirio Frazier MD, 1 puff at 10/21/19 0855    ketotifen (ZADITOR) 0 025 % ophthalmic solution 1 drop, 1 drop, Right Eye, BID PRN, Alirio Frazier MD    levothyroxine tablet 125 mcg, 125 mcg, Oral, Early Morning, Alirio Frazier MD, 125 mcg at 10/22/19 0620    magnesium hydroxide (MILK OF MAGNESIA) 400 mg/5 mL oral suspension 30 mL, 30 mL, Oral, Daily PRN, Alirio Frazier MD    montelukast (SINGULAIR) tablet 10 mg, 10 mg, Oral, HS, Alirio Frazier MD, 10 mg at 10/21/19 2124    OLANZapine (ZyPREXA) IM injection 5 mg, 5 mg, Intramuscular, Q8H PRN, Alirio Frazier MD    OLANZapine (ZyPREXA) tablet 5 mg, 5 mg, Oral, Q8H PRN, Alirio Frazier MD    ondansetron (ZOFRAN-ODT) dispersible tablet 4 mg, 4 mg, Oral, Q6H PRN, Alirio Frazier MD    pantoprazole (PROTONIX) EC tablet 40 mg, 40 mg, Oral, Early Morning, Charlies Crutch Ruthie Cartwright MD, 40 mg at 10/22/19 0620    polyethylene glycol (MIRALAX) packet 17 g, 17 g, Oral, Daily PRN, Christian Bennett MD    polyvinyl alcohol (LIQUIFILM TEARS) 1 4 % ophthalmic solution 1 drop, 1 drop, Both Eyes, Q3H PRN, Christian Bennett MD    theophylline (JEF-24) 24 hr capsule 200 mg, 200 mg, Oral, Daily, Christian Bennett MD, 200 mg at 10/21/19 0854    tiotropium (SPIRIVA) capsule for inhaler 18 mcg, 18 mcg, Inhalation, Daily, Christian Bennett MD, 18 mcg at 10/21/19 0854    traZODone (DESYREL) tablet 25 mg, 25 mg, Oral, HS PRN, Christian Bennett MD  Justification if on more than two antipsychotics:  Only on his clozapine  Side effects if any:  None    Risks , benefits, side effects and precautions of medications discussed with patient and reminded patient to let us know any problems with medications     Objective:     Vital Signs:  Vitals:    10/21/19 0700 10/21/19 1500 10/21/19 1900 10/21/19 2143   BP: 113/66 119/78 114/68    BP Location: Left arm Left arm Left arm    Pulse: 97 99 99    Resp: 18 18 16    Temp: 98 °F (36 7 °C) 98 6 °F (37 °C) 97 6 °F (36 4 °C)    TempSrc: Temporal Temporal Temporal    SpO2: 94% 97% 94% 93%   Weight:       Height:         Appearance:  age appropriate, casually dressed and overweight older than stated age   Behavior:  deviant, evasive and guarded and suspicious but with good eye contact   Speech:  normal pitch and normal volume but tends to become loud at times   Mood:  anxious and dysthymic   Affect:  mood-congruent   Thought Process:  goal directed and illogical slightly pressured but able to be redirected and talks as if in a court of low being stilted   Thought Content:  delusions  grandiose and somatic about not being able to breathe despite being on nasal oxygen and paranoid about people out to harm her and Atrovent inhaler tainteded with peanut oil  No current suicidal homicidal thoughts intent or plans reported    No phobias obsessions or compulsions reported   Perceptual Disturbances: None and does not appear responding to internal stimuli   Risk Potential: Tendency to not care for herself if she goes off treatment   Sensorium:  person, place, time/date, situation, day of week, month of year and time   Cognition:  grossly intact   Consciousness:  alert and awake    Attention: Intact concentration and attention span   Intellect: Considered to be at least of average intelligence   Insight:  limited in denial   Judgment: poor      Motor Activity: no abnormal movements         Recent Labs:  Results Reviewed     None          I/O Past 24 hours:  No intake/output data recorded  No intake/output data recorded  Assessment / Plan:     [facility]      Reason for continued inpatient care:  Because of underlying paranoia and refusal to go back to the personal care home and inability to care for herself on her own  Acceptance by patient:  Accepting  Quinn Velásquez in recovery:  About living in another personal care home once she feels better  Understanding of medications :  Has some understanding  Involved in reintegration process:  Adjusting to the unit  Trusting in relatoinship with psychiatrist:  Trusting    Recommended Treatment:    Medication changes:  1) no changes today and counseled to take meds as prescribed tatiana clozapine or else to reconsider stopping it   Non-pharmacological treatments  1) continue with  individual therapy group therapy, milieu therapy and occupational therapy and milieu therapy involving multidisciplinary team approach with psychotherapist, case management, nursing, peer support services, art therapist etc using recovery principles and psycho-education about accepting illness and need for treatment   3) encourage to attend to ADLs like taking showers and wearing clean clothes   4) Encourage to attend groups   Safety  1) Safety/communication plan established targeting dynamic risk factors above    Discharge Plan most likely back to the a: Personal care boarding home with act services once her delusions are managed and mood becomes more stabilized and she becomes more receptive to treatment compliance    Counseling / Coordination of Care    Total floor / unit time spent today 15 minutes  Greater than 50% of total time was spent with the patient and / or family counseling and / or coordination of care  A description of the counseling / coordination of care  Patient's Rights, confidentiality and exceptions to confidentiality, use of automated medical record, [facility] staff access to medical record, and consent to treatment reviewed      Sandhya Bolaños MD

## 2019-10-22 NOTE — PLAN OF CARE
Problem: Alteration in Thoughts and Perception  Goal: Agree to be compliant with medication regime, as prescribed and report medication side effects  Description  Interventions:  - Offer appropriate PRN medication and supervise ingestion; conduct aims, as needed   Outcome: Progressing     Problem: Alteration in Thoughts and Perception  Goal: Attend and participate in unit activities, including therapeutic, recreational, and educational groups  Description  Interventions:  - Provide therapeutic and educational activities daily, encourage attendance and participation, and document same in the medical record     CERTIFIED PEER SPECIALIST INTERVENTIONS:    Complete peer assessment with patient to assess their needs and identify their goals to complete while in the recovery program as well as once discharged into the community  Patient will complete WRAP Plan, Crisis Plan and 5 Life Domains  Patient will attend 50% of groups offered on the unit  Patient will complete a goal card weekly  Outcome: Not Progressing  Goal: Complete daily ADLs, including personal hygiene independently, as able  Description  Interventions:  - Observe, teach, and assist patient with ADLS  - Monitor and promote a balance of rest/activity, with adequate nutrition and elimination   Outcome: Not Progressing    Patient remains isolative only visible during medication and meal times and then retreats to her room  Lays in bed all day, refusing to get OOB for groups  She continues to complain about her "physical issues" but refuses to address her mental health issues  This writer reminded her again that all of her complaints about her physical health have been addressed and are unsubstantiated  She needs to start focusing on her mental health  The patient immediately stopped talking and walked away from this writer  Continue to monitor

## 2019-10-22 NOTE — QUICK NOTE
Subjective:    Patient seen and examined today at bedside  She complains of psychogenic dysphagia  She also requests an order to be placed for juice up to 3 times per day  Other wise patient denies any CP, SOB, or pain  Objective:    VS: Temp 97 2,  O2 91 %, BP 88/60 ( Repeat 119/78) , HR 98   Gen: WD/WN, NAD  Cardiac: RRR, No Murmur, rubs, jailene   Chest: CTA B/L  Abdomin: Soft, + BS  Extremities: No extremity edema noted on exam  Neuro: A&O X 3, Gross motor intact     Assessment & Plan    1  GERD  - Continue Protonix 40 mg daily   - Mylanta PRN    2  Hypothyroidism  - Continue Levothyroxine 125 mcg Daily  - TSH on 8/21/2019  Was 0 985, Repeat in 6 months 2/2020    3  COPD  - Continue Breo Ellipta and theophylline   - Albuterol PRN     4   Dysphagia  - Was worked up by Gastroenterology  - May need hiatal hernia repair in the future

## 2019-10-22 NOTE — PROGRESS NOTES
Patient not scheduled to attend      10/22/19 1430   Activity/Group Checklist   Group   (Community Programming Wrap up )   Attendance Did not attend   Attendance Duration (min) 16-30

## 2019-10-22 NOTE — PROGRESS NOTES
Pt declined     10/22/19 1100   Activity/Group Checklist   Group   (IMR/Communication Styles )   Attendance Did not attend   Attendance Duration (min) 46-60   Affect/Mood IRMA

## 2019-10-22 NOTE — PROGRESS NOTES
10/22/19 0900   Team Meeting   Meeting Type Daily Rounds   Team Members Present   Team Members Present Physician;Nurse;; Other (Discipline and Name)   Physician Team Member Dr Ileana Bro Team Member Jeff Everett RN   Care Management Team Member Brenda Lao   Other (Discipline and Name) Rand Stark, [facility]     Patient remains somatic reporting to staff she believes she is in a diabetic crisis and requesting juice once per shift  Woke up in the middle of night again reporting she was in a diabetic crisis and demanding a cup of juice  No other issues noted  Slept

## 2019-10-22 NOTE — PROGRESS NOTES
Progress Note - Chun Carrillo 1955, 60 y o  female MRN: [id number]    [facility] Encounter: [id number]    Primary Care Provider: Stephania Garcia MD   Date and time admitted to hospital: 7/23/2019  5:30 PM        * Schizoaffective disorder, bipolar type [facility])  Assessment & Plan  Psychiatry Progress Note    Patient continues to become psychosomatic claiming that she cannot breathe or swallow even though she does appear to breathe fine while observed and swallowing study was negative and she is still demandng to be on chopped diet  She was evaluated by medical resident yesterday was ordered orange juice 3 times a day but was demanding to get more orange juice claiming she was hypoglycemic last night  She is still  suspicious about certain staff who gives her medications like her inhalers and the spirometer off and on and now asking to see respiratory for her portable oxygen which she wants to use when she gets out of the unit even though she has not used it here at all  She is also coming up with several excuses for not taking showers consistently  She was again reminded that  if she maintains her 25% group attendance and attends to her ADLs skills,  we can always look into discharging her back to the [place] personal care boarding home again  She continues to present with stilted speech being too formal as if talking in a court of law which has not changed  She does not admit to any overt hallucinations or paranoia but still appears to harbor some covert  paranoia based on her demeanor and interaction on the unit and does admit that she does have "psychosomatic sxs"     She no longer believes that there is a micro chip on her right forearm and 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  No current suicidal homicidal thoughts intent or plans reported  No overt hallucinations or other delusions reported    No signs or sxs of agranulocytosis or myocarditis or endocarditis and she is moving her bowels so far with no issues  Patient was again reminded to attend to ADLs skills and take showers at least every other day and attend more groups to keep up with her 25% expectation   She is now compliant with the clozapine as prescribed  She was accusing the therapist yesterday of being in love with a medical resident whom she thinks is in love with, pointing towards paranoia as well      Current medications:    Current Facility-Administered Medications:     acetaminophen (TYLENOL) tablet 650 mg, 650 mg, Oral, Q6H PRN, Meldon Curling, MD    albuterol (PROVENTIL HFA,VENTOLIN HFA) inhaler 2 puff, 2 puff, Inhalation, Q4H PRN, Meldon Curling, MD, 2 puff at 10/11/19 0424    aluminum-magnesium hydroxide-simethicone (MYLANTA) 200-200-20 mg/5 mL oral suspension 15 mL, 15 mL, Oral, Q4H PRN, Meldon Curling, MD    ammonium lactate (LAC-HYDRIN) 12 % lotion 1 application, 1 application, Topical, BID PRN, Meldon Curling, MD    benzonatate (TESSALON PERLES) capsule 100 mg, 100 mg, Oral, TID PRN, Meldon Curling, MD    benztropine (COGENTIN) injection 1 mg, 1 mg, Intramuscular, Q8H PRN, Meldon Curling, MD    cloZAPine (CLOZARIL) tablet 25 mg, 25 mg, Oral, HS, Meldon Curling, MD, 25 mg at 10/21/19 2124    cloZAPine (CLOZARIL) tablet 50 mg, 50 mg, Oral, BID, Meldon Curling, MD, 50 mg at 10/21/19 2123    EPINEPHrine PF (ADRENALIN) 1 mg/mL injection 0 15 mg, 0 15 mg, Intramuscular, Once PRN, Meldon Curling, MD    FLUoxetine (PROzac) capsule 20 mg, 20 mg, Oral, Daily, Meldon Curling, MD, 20 mg at 10/21/19 0854    fluticasone-vilanterol (BREO ELLIPTA) 200-25 MCG/INH inhaler 1 puff, 1 puff, Inhalation, Daily, Meldon Curling, MD, 1 puff at 10/21/19 0855    ketotifen (ZADITOR) 0 025 % ophthalmic solution 1 drop, 1 drop, Right Eye, BID PRN, Meldon Curling, MD    levothyroxine tablet 125 mcg, 125 mcg, Oral, Early Morning, Meldon Curling, MD, 125 mcg at 10/22/19 0620    magnesium hydroxide (MILK OF MAGNESIA) 400 mg/5 mL oral suspension 30 mL, 30 mL, Oral, Daily PRN, Deep Durand MD    montelukast (SINGULAIR) tablet 10 mg, 10 mg, Oral, HS, Deep Durand MD, 10 mg at 10/21/19 2124    OLANZapine (ZyPREXA) IM injection 5 mg, 5 mg, Intramuscular, Q8H PRN, Deep Durand MD    OLANZapine (ZyPREXA) tablet 5 mg, 5 mg, Oral, Q8H PRN, Deep Durand MD    ondansetron (ZOFRAN-ODT) dispersible tablet 4 mg, 4 mg, Oral, Q6H PRN, Deep Durand MD    pantoprazole (PROTONIX) EC tablet 40 mg, 40 mg, Oral, Early Morning, Deep Durand MD, 40 mg at 10/22/19 0620    polyethylene glycol (MIRALAX) packet 17 g, 17 g, Oral, Daily PRN, Deep Durand MD    polyvinyl alcohol (LIQUIFILM TEARS) 1 4 % ophthalmic solution 1 drop, 1 drop, Both Eyes, Q3H PRN, Deep Durand MD    theophylline (JEF-24) 24 hr capsule 200 mg, 200 mg, Oral, Daily, Deep Durand MD, 200 mg at 10/21/19 0854    tiotropium (SPIRIVA) capsule for inhaler 18 mcg, 18 mcg, Inhalation, Daily, Deep Durand MD, 18 mcg at 10/21/19 0854    traZODone (DESYREL) tablet 25 mg, 25 mg, Oral, HS PRN, Deep Durand MD  Justification if on more than two antipsychotics:  Only on his clozapine  Side effects if any:  None    Risks , benefits, side effects and precautions of medications discussed with patient and reminded patient to let us know any problems with medications     Objective:     Vital Signs:  Vitals:    10/21/19 0700 10/21/19 1500 10/21/19 1900 10/21/19 2143   BP: 113/66 119/78 114/68    BP Location: Left arm Left arm Left arm    Pulse: 97 99 99    Resp: 18 18 16    Temp: 98 °F (36 7 °C) 98 6 °F (37 °C) 97 6 °F (36 4 °C)    TempSrc: Temporal Temporal Temporal    SpO2: 94% 97% 94% 93%   Weight:       Height:         Appearance:  age appropriate, casually dressed and overweight older than stated age   Behavior:  deviant, evasive and guarded and suspicious but with good eye contact   Speech:  normal pitch and normal volume but tends to become loud at times   Mood:  anxious and dysthymic   Affect:  mood-congruent   Thought Process:  goal directed and illogical slightly pressured but able to be redirected and talks as if in a court of low being stilted   Thought Content:  delusions  grandiose and somatic about not being able to breathe despite being on nasal oxygen and paranoid about people out to harm her and Atrovent inhaler tainteded with peanut oil  No current suicidal homicidal thoughts intent or plans reported  No phobias obsessions or compulsions reported   Perceptual Disturbances: None and does not appear responding to internal stimuli   Risk Potential: Tendency to not care for herself if she goes off treatment   Sensorium:  person, place, time/date, situation, day of week, month of year and time   Cognition:  grossly intact   Consciousness:  alert and awake    Attention: Intact concentration and attention span   Intellect: Considered to be at least of average intelligence   Insight:  limited in denial   Judgment: poor      Motor Activity: no abnormal movements         Recent Labs:  Results Reviewed     None          I/O Past 24 hours:  No intake/output data recorded  No intake/output data recorded          Assessment / Plan:     [facility]ffective [facility]      Reason for continued inpatient care:  Because of underlying paranoia and refusal to go back to the personal care home and inability to care for herself on her own  Acceptance by patient:  Accepting  [place] in recovery:  About living in another personal care home once she feels better  Understanding of medications :  Has some understanding  Involved in reintegration process:  Adjusting to the unit  Trusting in relatoinship with psychiatrist:  Trusting    Recommended Treatment:    Medication changes:  1) no changes today and counseled to take meds as prescribed tatiana clozapine or else to reconsider stopping it   Non-pharmacological treatments  1) continue with  individual therapy group therapy, milieu therapy and occupational therapy and milieu therapy involving multidisciplinary team approach with psychotherapist, case management, nursing, peer support services, art therapist etc using recovery principles and psycho-education about accepting illness and need for treatment   3) encourage to attend to ADLs like taking showers and wearing clean clothes   4) Encourage to attend groups   Safety  1) Safety/communication plan established targeting dynamic risk factors above  Discharge Plan most likely back to the a:  Personal care boarding home with act services once her delusions are managed and mood becomes more stabilized and she becomes more receptive to treatment compliance    Counseling / Coordination of Care    Total floor / unit time spent today 15 minutes  Greater than 50% of total time was spent with the patient and / or family counseling and / or coordination of care  A description of the counseling / coordination of care  Patient's Rights, confidentiality and exceptions to confidentiality, use of automated medical record, [facility], and consent to treatment reviewed      Jeffrey Gallegos MD

## 2019-10-22 NOTE — QUICK NOTE
Pt with nearly complete workup for dysphagia  Hiatal hernia creating some angulation of distal esophagus may certainly cause trouble eating particularly if eating quickly  Psychiatrically still being treated  In effort to complete workup, pt will need outpatient esophageal motility study (if surgery desired) which can only be done at [address] [place] [address]  Then referral to general surgery for H hernia repair  Continue diet as tolerated  Liquid supplements should be well tolerated  Soft mechanical diet  Will sign off

## 2019-10-22 NOTE — PLAN OF CARE
Problem: PAIN - ADULT  Goal: Verbalizes/displays adequate comfort level or baseline comfort level  Description  Interventions:  - Encourage patient to monitor pain and request assistance  - Assess pain using appropriate pain scale  - Administer analgesics based on type and severity of pain and evaluate response  - Implement non-pharmacological measures as appropriate and evaluate response  - Consider cultural and social influences on pain and pain management  - Notify physician/advanced practitioner if interventions unsuccessful or patient reports new pain  Outcome: Progressing     Problem: SAFETY ADULT  Goal: Patient will remain free of falls  Description  INTERVENTIONS:  - Assess patient frequently for physical needs  -  Identify cognitive and physical deficits and behaviors that affect risk of falls    -  [place] fall precautions as indicated by assessment   - Educate patient/family on patient safety including physical limitations  - Instruct patient to call for assistance with activity based on assessment  - Modify environment to reduce risk of injury  - Consider OT/PT consult to assist with strengthening/mobility  Outcome: Progressing     Problem: RESPIRATORY - ADULT  Goal: Achieves optimal ventilation and oxygenation  Description  INTERVENTIONS:  - Assess for changes in respiratory status  - Assess for changes in mentation and behavior  - Position to facilitate oxygenation and minimize respiratory effort  - Oxygen administration by appropriate delivery method based on oxygen saturation (per order) or ABGs  - Initiate smoking cessation education as indicated  - Encourage broncho-pulmonary hygiene including cough, deep breathe, Incentive Spirometry  - Assess the need for suctioning and aspirate as needed  - Assess and instruct to report SOB or any respiratory difficulty  - Respiratory Therapy support as indicated  Outcome: Progressing     Problem: SLEEP DISTURBANCE  Goal: Will exhibit normal sleeping pattern  Description  Interventions:  -  Assess the patients sleep pattern, noting recent changes  - Administer medication as ordered  - Decrease environmental stimuli, including noise, as appropriate during the night  - Encourage the patient to actively participate in unit groups and or exercise during the day to enhance ability to achieve adequate sleep at night  - Assess the patient, in the morning, encouraging a description of sleep experience  Outcome: [place] maintained on ongoing SAFE precaution without incident on this shift  Laying in bed with eyes closed, breath even and unlabored  Q 15 minutes rounding continues  No indication of pain or discomfort noted  No respiratory distress noted  On 1L O2 with humidifier via NC while asleep  Tu Bautista is obsessively asking for orange juice because she believes her blood sugar is low and that she should have juice  Order stated she can have juice as needed three time a day  Tu Bautista has max her juice request for the day  Yelling out from her room at 0226 help I need some juice  Will continue to monitor

## 2019-10-23 PROCEDURE — 99231 SBSQ HOSP IP/OBS SF/LOW 25: CPT | Performed by: PSYCHIATRY & NEUROLOGY

## 2019-10-23 RX ADMIN — CLOZAPINE 25 MG: 25 TABLET ORAL at 21:32

## 2019-10-23 RX ADMIN — FLUTICASONE FUROATE AND VILANTEROL TRIFENATATE 1 PUFF: 200; 25 POWDER RESPIRATORY (INHALATION) at 08:50

## 2019-10-23 RX ADMIN — THEOPHYLLINE ANHYDROUS 200 MG: 200 CAPSULE, EXTENDED RELEASE ORAL at 08:50

## 2019-10-23 RX ADMIN — MONTELUKAST SODIUM 10 MG: 10 TABLET, COATED ORAL at 21:31

## 2019-10-23 RX ADMIN — CLOZAPINE 50 MG: 25 TABLET ORAL at 21:32

## 2019-10-23 RX ADMIN — TIOTROPIUM BROMIDE 18 MCG: 18 CAPSULE ORAL; RESPIRATORY (INHALATION) at 08:50

## 2019-10-23 RX ADMIN — FLUOXETINE 20 MG: 20 CAPSULE ORAL at 08:50

## 2019-10-23 RX ADMIN — PANTOPRAZOLE SODIUM 40 MG: 40 TABLET, DELAYED RELEASE ORAL at 05:48

## 2019-10-23 RX ADMIN — LEVOTHYROXINE SODIUM 125 MCG: 125 TABLET ORAL at 05:48

## 2019-10-23 RX ADMIN — CLOZAPINE 50 MG: 25 TABLET ORAL at 16:57

## 2019-10-23 NOTE — ASSESSMENT & PLAN NOTE
Psychiatry Progress Note    Patient is excited today as her son and his wife visited with her yesterday before he was deployed to the [place] for 6 months and gave her sweatshirt and she was happy to display that today  She has not taken any showers in the last 3 days  continues to become psychosomatic claiming that she cannot breathe or swallow and she claims that she has low blood sugar which is not based on fact even though she does appear to breathe fine while observed and swallowing study was negative and she is still demandng to be on chopped diet  She is now on mechanical soft diet as per recommendation of GI and they have recommended outpatient surgery for hiatal hernia repair if she chooses to upon dischargeShe is still  suspicious about certain staff who gives her medications like her inhalers and the spirometer off and on and now asking to see respiratory for her portable oxygen which she wants to use when she gets out of the unit even though she has not used it here at all  She is also coming up with several excuses for not taking showers consistently  She was again reminded that  if she maintains her 25% group attendance and attends to her ADLs skills,  we can always look into discharging her back to the [facility] personal care boarding home again  She continues to present with stilted speech being too formal as if talking in a court of law which has not changed  She does not admit to any overt hallucinations or paranoia but still appears to harbor some covert  paranoia based on her demeanor and interaction on the unit and does admit that she does have "psychosomatic sxs"     She she today verbalizes her suspicion that that there is a micro chip on her right forearm and not a fatty tumor  No current suicidal homicidal thoughts intent or plans reported  No overt hallucinations or other delusions reported    No signs or sxs of agranulocytosis or myocarditis or endocarditis and she is moving her bowels so far with no issues  Patient was again reminded to attend to ADLs skills and take showers at least every other day and attend more groups to keep up with her 25% expectation        Current medications:    Current Facility-Administered Medications:     acetaminophen (TYLENOL) tablet 650 mg, 650 mg, Oral, Q6H PRN, Jill Gayle MD    albuterol (PROVENTIL HFA,VENTOLIN HFA) inhaler 2 puff, 2 puff, Inhalation, Q4H PRN, Jill Gayle MD, 2 puff at 10/11/19 0424    aluminum-magnesium hydroxide-simethicone (MYLANTA) 200-200-20 mg/5 mL oral suspension 15 mL, 15 mL, Oral, Q4H PRN, Jill Gayle MD    ammonium lactate (LAC-HYDRIN) 12 % lotion 1 application, 1 application, Topical, BID PRN, Jill Galye MD    benzonatate (TESSALON PERLES) capsule 100 mg, 100 mg, Oral, TID PRN, Jill Gayle MD    benztropine (COGENTIN) injection 1 mg, 1 mg, Intramuscular, Q8H PRN, Jill Gayle MD    cloZAPine (CLOZARIL) tablet 25 mg, 25 mg, Oral, HS, Jill Gayle MD, 25 mg at 10/22/19 2103    cloZAPine (CLOZARIL) tablet 50 mg, 50 mg, Oral, BID, Jill Gayle MD, 50 mg at 10/22/19 2100    EPINEPHrine PF (ADRENALIN) 1 mg/mL injection 0 15 mg, 0 15 mg, Intramuscular, Once PRN, Jill Gayle MD    FLUoxetine (PROzac) capsule 20 mg, 20 mg, Oral, Daily, Jill Gayle MD, 20 mg at 10/23/19 0850    fluticasone-vilanterol (BREO ELLIPTA) 200-25 MCG/INH inhaler 1 puff, 1 puff, Inhalation, Daily, Jill Gayle MD, 1 puff at 10/23/19 0850    ketotifen (ZADITOR) 0 025 % ophthalmic solution 1 drop, 1 drop, Right Eye, BID PRN, Jill Gayle MD    levothyroxine tablet 125 mcg, 125 mcg, Oral, Early Morning, Jill Gayle MD, 125 mcg at 10/23/19 0548    magnesium hydroxide (MILK OF MAGNESIA) 400 mg/5 mL oral suspension 30 mL, 30 mL, Oral, Daily PRN, Jill Gayle MD    montelukast (SINGULAIR) tablet 10 mg, 10 mg, Oral, HS, Jill Gayle MD, 10 mg at 10/22/19 2102    OLANZapine (ZyPREXA) IM injection 5 mg, 5 mg, Intramuscular, Q8H PRN, Jill Gayle, MD    OLANZapine (ZyPREXA) tablet 5 mg, 5 mg, Oral, Q8H PRN, Alirio Frazier MD    ondansetron (ZOFRAN-ODT) dispersible tablet 4 mg, 4 mg, Oral, Q6H PRN, Alirio Frazier MD    pantoprazole (PROTONIX) EC tablet 40 mg, 40 mg, Oral, Early Morning, Alirio Frazier MD, 40 mg at 10/23/19 0548    polyethylene glycol (MIRALAX) packet 17 g, 17 g, Oral, Daily PRN, Alirio Frazier MD    polyvinyl alcohol (LIQUIFILM TEARS) 1 4 % ophthalmic solution 1 drop, 1 drop, Both Eyes, Q3H PRN, Alirio Frazier MD    theophylline (JEF-24) 24 hr capsule 200 mg, 200 mg, Oral, Daily, Alirio Frazier MD, 200 mg at 10/23/19 0850    tiotropium (SPIRIVA) capsule for inhaler 18 mcg, 18 mcg, Inhalation, Daily, Alirio Frazier MD, 18 mcg at 10/23/19 0850    traZODone (DESYREL) tablet 25 mg, 25 mg, Oral, HS PRN, Alirio Frazier MD  Justification if on more than two antipsychotics:  Only on his clozapine  Side effects if any:  None    Risks , benefits, side effects and precautions of medications discussed with patient and reminded patient to let us know any problems with medications     Objective:     Vital Signs:  Vitals:    10/22/19 1600 10/22/19 1950 10/22/19 2141 10/23/19 0700   BP: 98/53 90/73  109/67   BP Location: Left arm Left arm  Left arm   Pulse: 89 (!) 108  (!) 107   Resp: 16 16  19   Temp: (!) 97 3 °F (36 3 °C) 97 8 °F (36 6 °C)  98 °F (36 7 °C)   TempSrc: Temporal Temporal  Temporal   SpO2: 96% 92% 93%    Weight:       Height:         Appearance:  age appropriate, casually dressed and overweight older than stated age   Behavior:  deviant, evasive and guarded and suspicious but with good eye contact   Speech:  normal pitch and normal volume but tends to become loud at times   Mood:  anxious and dysthymic   Affect:  mood-congruent   Thought Process:  goal directed and illogical slightly pressured but able to be redirected and talks as if in a court of low being stilted   Thought Content:  delusions  grandiose and somatic about not being able to breathe despite being on nasal oxygen and paranoid about people out to harm her and Atrovent inhaler tainteded with peanut oil  No current suicidal homicidal thoughts intent or plans reported  No phobias obsessions or compulsions reported   Perceptual Disturbances: None and does not appear responding to internal stimuli   Risk Potential: Tendency to not care for herself if she goes off treatment   Sensorium:  person, place, time/date, situation, day of week, month of year and time   Cognition:  grossly intact   Consciousness:  alert and awake    Attention: Intact concentration and attention span   Intellect: Considered to be at least of average intelligence   Insight:  limited in denial   Judgment: poor      Motor Activity: no abnormal movements         Recent Labs:  Results Reviewed     None          I/O Past 24 hours:  No intake/output data recorded  No intake/output data recorded          Assessment / Plan:     Schizoaffective disorder, bipolar type ([facility] )      Reason for continued inpatient care:  Because of underlying paranoia and refusal to go back to the personal care home and inability to care for herself on her own  Acceptance by patient:  Accepting  [place] Caraballo in recovery:  About living in another personal care home once she feels better  Understanding of medications :  Has some understanding  Involved in reintegration process:  Adjusting to the unit  Trusting in relatoinship with psychiatrist:  Trusting    Recommended Treatment:    Medication changes:  1) no changes today and counseled to take meds as prescribed tatiana clozapine or else to reconsider stopping it   Non-pharmacological treatments  1) continue with  individual therapy group therapy, milieu therapy and occupational therapy and milieu therapy involving multidisciplinary team approach with psychotherapist, case management, nursing, peer support services, art therapist etc using recovery principles and psycho-education about accepting illness and need for treatment   3) encourage to attend to ADLs like taking showers and wearing clean clothes   4) Encourage to attend groups   Safety  1) Safety/communication plan established targeting dynamic risk factors above  Discharge Plan most likely back to the a:  Personal care boarding home with [facility] once her delusions are managed and mood becomes more stabilized and she becomes more receptive to treatment compliance    Counseling / Coordination of Care    Total floor / unit time spent today 15 minutes  Greater than 50% of total time was spent with the patient and / or family counseling and / or coordination of care  A description of the counseling / coordination of care  Patient's Rights, confidentiality and exceptions to confidentiality, use of automated medical record, [facility] staff access to medical record, and consent to treatment reviewed      Jaz Beasley MD

## 2019-10-23 NOTE — PLAN OF CARE
Problem: Alteration in Thoughts and Perception  Goal: Verbalize thoughts and feelings  Description  Interventions:  - Promote a nonjudgmental and trusting relationship with the patient through active listening and therapeutic communication  - Assess patient's level of functioning, behavior and potential for risk  - Engage patient in 1 on 1 interactions for a minimum of 15 minutes each session  - Encourage patient to express fears, feelings, frustrations, and discuss symptoms    - [place] patient to reality, help patient recognize reality-based thinking   - Administer medications as ordered and assess for potential side effects  - Provide the patient education related to the signs and symptoms of the illness and desired effects of prescribed medications  Outcome: Progressing  Goal: Agree to be compliant with medication regime, as prescribed and report medication side effects  Description  Interventions:  - Offer appropriate PRN medication and supervise ingestion; conduct aims, as needed   Outcome: Progressing   Patient isolative to room throughout the evening  Cooperative with unit routine  Upon approach, patient denied any unmet needs  HS medication compliant  Will monitor

## 2019-10-23 NOTE — PLAN OF CARE
Problem: Alteration in Thoughts and Perception  Goal: Agree to be compliant with medication regime, as prescribed and report medication side effects  Description  Interventions:  - Offer appropriate PRN medication and supervise ingestion; conduct aims, as needed   Outcome: Progressing     Problem: Alteration in Thoughts and Perception  Goal: Attend and participate in unit activities, including therapeutic, recreational, and educational groups  Description  Interventions:  - Provide therapeutic and educational activities daily, encourage attendance and participation, and document same in the medical record     CERTIFIED PEER SPECIALIST INTERVENTIONS:    Complete peer assessment with patient to assess their needs and identify their goals to complete while in the recovery program as well as once discharged into the community  Patient will complete WRAP Plan, Crisis Plan and 5 Life Domains  Patient will attend 50% of groups offered on the unit  Patient will complete a goal card weekly  Outcome: Not Progressing  Goal: Recognize dysfunctional thoughts, communicate reality-based thoughts at the time of discharge  Description  Interventions:  - Provide medication and psycho-education to assist patient in compliance and developing insight into his/her illness   Outcome: Not Progressing  Goal: Complete daily ADLs, including personal hygiene independently, as able  Description  Interventions:  - Observe, teach, and assist patient with ADLS  - Monitor and promote a balance of rest/activity, with adequate nutrition and elimination   Outcome: Not Progressing   Patient remains isolative only visible during medication and meal times and then retreats to her room  Lays in bed all day, refusing to get OOB for groups  Patient encouraged multiple times to shower as this is part of her behavioral plan to shower on odd number days  She keeps refusing and coming up with somatic excuses why she cannot shower    Continue to monitor

## 2019-10-23 NOTE — PLAN OF CARE
Problem: Alteration in Thoughts and Perception  Goal: Verbalize thoughts and feelings  Description  Interventions:  - Promote a nonjudgmental and trusting relationship with the patient through active listening and therapeutic communication  - Assess patient's level of functioning, behavior and potential for risk  - Engage patient in 1 on 1 interactions for a minimum of 15 minutes each session  - Encourage patient to express fears, feelings, frustrations, and discuss symptoms    - [place] patient to reality, help patient recognize reality-based thinking   - Administer medications as ordered and assess for potential side effects  - Provide the patient education related to the signs and symptoms of the illness and desired effects of prescribed medications  Outcome: Progressing  Goal: Agree to be compliant with medication regime, as prescribed and report medication side effects  Description  Interventions:  - Offer appropriate PRN medication and supervise ingestion; conduct aims, as needed   Outcome: Progressing     Problem: Risk for Self Injury/Neglect  Goal: Refrain from harming self  Description  Interventions:  - Monitor patient closely, per order  - Develop a trusting relationship  - Supervise medication ingestion, monitor effects and side effects   Outcome: Progressing     Problem: SAFETY ADULT  Goal: Patient will remain free of falls  Description  INTERVENTIONS:  - Assess patient frequently for physical needs  -  Identify cognitive and physical deficits and behaviors that affect risk of falls    -  [place] fall precautions as indicated by assessment   - Educate patient/family on patient safety including physical limitations  - Instruct patient to call for assistance with activity based on assessment  - Modify environment to reduce risk of injury  - Consider OT/PT consult to assist with strengthening/mobility  Outcome: Progressing     Problem: Alteration in Thoughts and Perception  Goal: Attend and participate in unit activities, including therapeutic, recreational, and educational groups  Description  Interventions:  - Provide therapeutic and educational activities daily, encourage attendance and participation, and document same in the medical record     CERTIFIED PEER SPECIALIST INTERVENTIONS:    Complete peer assessment with patient to assess their needs and identify their goals to complete while in the recovery program as well as once discharged into the community  Patient will complete WRAP Plan, Crisis Plan and 5 Life Domains  Patient will attend 50% of groups offered on the unit  Patient will complete a goal card weekly  Outcome: Not Progressing  Goal: Complete daily ADLs, including personal hygiene independently, as able  Description  Interventions:  - Observe, teach, and assist patient with ADLS  - Monitor and promote a balance of rest/activity, with adequate nutrition and elimination   Outcome: Not Progressing     Problem: Depression  Goal: Refrain from isolation  Description  Interventions:  - Develop a trusting relationship   - Encourage socialization   Outcome: Not Progressing  Goal: Refrain from self-neglect  Outcome: Not Isaias Prater has been in her room most of the shift  She came out for meal and medication with prompting  Took pills without difficulty  She swallows them whole with water  Ate 75% of her meal  Denied anxiety, but stated that she is depressed  No mention of shortness of breath or difficulty swallowing  No shower or Bm this shift  Social with staff and select peers  Incentive spirometer encouraged  Was able to reach 1250 ml's  Did not attend evening group  Ate snack  Took Hs medication  Oxygen saturation 94% on room air before oxygen applied at 1 liter nasal cannula (humidified)  Continue to monitor  Precautions maintained

## 2019-10-23 NOTE — PROGRESS NOTES
Pt declined     10/23/19 1100   Activity/Group Checklist   Group   (IMR/Practicing Coping Skills )   Attendance Did not attend   Attendance Duration (min) 46-60   Affect/Mood IRMA

## 2019-10-23 NOTE — PROGRESS NOTES
Sleeping at this time, no complaints offered, O2 on via NC, all precautions in place and monitoring continues

## 2019-10-23 NOTE — PROGRESS NOTES
Patient continues to decline groups      10/22/19 1500   Activity/Group Checklist   Group   (Recovery Workshop )   Attendance Did not attend   Attendance Duration (min) 46-60   Affect/Mood IRMA

## 2019-10-23 NOTE — PROGRESS NOTES
Progress Note - Melody Ruddy 1955, 60 y o  female MRN: [id number]    Unit[facility] 110-02 Encounter: [id number]    Primary Care Provider: Natividad Oseguera MD   Date and time admitted to hospital: 7/23/2019  5:30 PM        * Schizoaffective disorder, bipolar type [facility])  Assessment & Plan  Psychiatry Progress Note    Patient is excited today as her son and his wife visited with her yesterday before he was deployed to the [place] for 6 months and gave her sweatshirt and she was happy to display that today  She has not taken any showers in the last 3 days  continues to become psychosomatic claiming that she cannot breathe or swallow and she claims that she has low blood sugar which is not based on fact even though she does appear to breathe fine while observed and swallowing study was negative and she is still demandng to be on chopped diet  She is now on mechanical soft diet as per recommendation of GI and they have recommended outpatient surgery for hiatal hernia repair if she chooses to upon dischargeShe is still  suspicious about certain staff who gives her medications like her inhalers and the spirometer off and on and now asking to see respiratory for her portable oxygen which she wants to use when she gets out of the unit even though she has not used it here at all  She is also coming up with several excuses for not taking showers consistently  She was again reminded that  if she maintains her 25% group attendance and attends to her ADLs skills,  we can always look into discharging her back [facility] [place] [facility]  She continues to present with stilted speech being too formal as if talking in a court of law which has not changed  She does not admit to any overt hallucinations or paranoia but still appears to harbor some covert  paranoia based on her demeanor and interaction on the unit and does admit that she does have "psychosomatic sxs"      She she today verbalizes her suspicion that that there is a micro chip on her right forearm and not a fatty tumor  No current suicidal homicidal thoughts intent or plans reported  No overt hallucinations or other delusions reported   No signs or sxs of agranulocytosis or myocarditis or endocarditis and she is moving her bowels so far with no issues  Patient was again reminded to attend to ADLs skills and take showers at least every other day and attend more groups to keep up with her 25% expectation        Current medications:    Current Facility-Administered Medications:     acetaminophen (TYLENOL) tablet 650 mg, 650 mg, Oral, Q6H PRN, Shi Pham MD    albuterol (PROVENTIL HFA,VENTOLIN HFA) inhaler 2 puff, 2 puff, Inhalation, Q4H PRN, Shi Pham MD, 2 puff at 10/11/19 0424    aluminum-magnesium hydroxide-simethicone (MYLANTA) 200-200-20 mg/5 mL oral suspension 15 mL, 15 mL, Oral, Q4H PRN, Shi Pham MD    ammonium lactate (LAC-HYDRIN) 12 % lotion 1 application, 1 application, Topical, BID PRN, Shi Pham MD    benzonatate (TESSALON PERLES) capsule 100 mg, 100 mg, Oral, TID PRN, Shi Pham MD    benztropine (COGENTIN) injection 1 mg, 1 mg, Intramuscular, Q8H PRN, Shi Pham MD    cloZAPine (CLOZARIL) tablet 25 mg, 25 mg, Oral, HS, Shi Pham MD, 25 mg at 10/22/19 2103    cloZAPine (CLOZARIL) tablet 50 mg, 50 mg, Oral, BID, Shi Pham MD, 50 mg at 10/22/19 2100    EPINEPHrine PF (ADRENALIN) 1 mg/mL injection 0 15 mg, 0 15 mg, Intramuscular, Once PRN, Shi Pham MD    FLUoxetine (PROzac) capsule 20 mg, 20 mg, Oral, Daily, Shi Pham MD, 20 mg at 10/23/19 0850    fluticasone-vilanterol (BREO ELLIPTA) 200-25 MCG/INH inhaler 1 puff, 1 puff, Inhalation, Daily, Shi Pham MD, 1 puff at 10/23/19 0850    ketotifen (ZADITOR) 0 025 % ophthalmic solution 1 drop, 1 drop, Right Eye, BID PRN, Shi Pham MD    levothyroxine tablet 125 mcg, 125 mcg, Oral, Early Morning, Shi Pham MD, 125 mcg at 10/23/19 0548   magnesium hydroxide (MILK OF MAGNESIA) 400 mg/5 mL oral suspension 30 mL, 30 mL, Oral, Daily PRN, Shaggy Rangel MD    montelukast (SINGULAIR) tablet 10 mg, 10 mg, Oral, HS, Shaggy Rangel MD, 10 mg at 10/22/19 2102    OLANZapine (ZyPREXA) IM injection 5 mg, 5 mg, Intramuscular, Q8H PRN, Shaggy Rangel MD    OLANZapine (ZyPREXA) tablet 5 mg, 5 mg, Oral, Q8H PRN, Shaggy Rangel MD    ondansetron (ZOFRAN-ODT) dispersible tablet 4 mg, 4 mg, Oral, Q6H PRN, Shaggy Rangel MD    pantoprazole (PROTONIX) EC tablet 40 mg, 40 mg, Oral, Early Morning, Shaggy Rangel MD, 40 mg at 10/23/19 0548    polyethylene glycol (MIRALAX) packet 17 g, 17 g, Oral, Daily PRN, Shaggy aRngel MD    polyvinyl alcohol (LIQUIFILM TEARS) 1 4 % ophthalmic solution 1 drop, 1 drop, Both Eyes, Q3H PRN, Shaggy Rangel MD    theophylline (JEF-24) 24 hr capsule 200 mg, 200 mg, Oral, Daily, Shaggy Rangel MD, 200 mg at 10/23/19 0850    tiotropium (SPIRIVA) capsule for inhaler 18 mcg, 18 mcg, Inhalation, Daily, Shaggy Rangel MD, 18 mcg at 10/23/19 0850    traZODone (DESYREL) tablet 25 mg, 25 mg, Oral, HS PRN, Shaggy Rangel MD  Justification if on more than two antipsychotics:  Only on his clozapine  Side effects if any:  None    Risks , benefits, side effects and precautions of medications discussed with patient and reminded patient to let us know any problems with medications     Objective:     Vital Signs:  Vitals:    10/22/19 1600 10/22/19 1950 10/22/19 2141 10/23/19 0700   BP: 98/53 90/73  109/67   BP Location: Left arm Left arm  Left arm   Pulse: 89 (!) 108  (!) 107   Resp: 16 16  19   Temp: (!) 97 3 °F (36 3 °C) 97 8 °F (36 6 °C)  98 °F (36 7 °C)   TempSrc: Temporal Temporal  Temporal   SpO2: 96% 92% 93%    Weight:       Height:         Appearance:  age appropriate, casually dressed and overweight older than stated age   Behavior:  deviant, evasive and guarded and suspicious but with good eye contact   Speech:  normal pitch and normal volume but tends to become loud at times   Mood:  anxious and dysthymic   Affect:  mood-congruent   Thought Process:  goal directed and illogical slightly pressured but able to be redirected and talks as if in a court of low being stilted   Thought Content:  delusions  grandiose and somatic about not being able to breathe despite being on nasal oxygen and paranoid about people out to harm her and Atrovent inhaler tainteded with peanut oil  No current suicidal homicidal thoughts intent or plans reported  No phobias obsessions or compulsions reported   Perceptual Disturbances: None and does not appear responding to internal stimuli   Risk Potential: Tendency to not care for herself if she goes off treatment   Sensorium:  person, place, time/date, situation, day of week, month of year and time   Cognition:  grossly intact   Consciousness:  alert and awake    Attention: Intact concentration and attention span   Intellect: Considered to be at least of average intelligence   Insight:  limited in denial   Judgment: poor      Motor Activity: no abnormal movements         Recent Labs:  Results Reviewed     None          I/O Past 24 hours:  No intake/output data recorded  No intake/output data recorded          Assessment / Plan:     [facility]ffective disorder[facility]      Reason for continued inpatient care:  Because of underlying paranoia and refusal to go back to the personal care home and inability to care for herself on her own  Acceptance by patient:  Accepting  Audelia Arthur in recovery:  About living in another personal care home once she feels better  Understanding of medications :  Has some understanding  Involved in reintegration process:  Adjusting to the unit  Trusting in relatoinship with psychiatrist:  Trusting    Recommended Treatment:    Medication changes:  1) no changes today and counseled to take meds as prescribed tatiana clozapine or else to reconsider stopping it   Non-pharmacological treatments  1) continue with individual therapy group therapy, milieu therapy and occupational therapy and milieu therapy involving multidisciplinary team approach with psychotherapist, case management, nursing, peer support services, art therapist etc using recovery principles and psycho-education about accepting illness and need for treatment   3) encourage to attend to ADLs like taking showers and wearing clean clothes   4) Encourage to attend groups   Safety  1) Safety/communication plan established targeting dynamic risk factors above  Discharge Plan most likely back to the a:  Personal care boarding home with act services once her delusions are managed and mood becomes more stabilized and she becomes more receptive to treatment compliance    Counseling / Coordination of Care    Total floor / unit time spent today 15 minutes  Greater than 50% of total time was spent with the patient and / or family counseling and / or coordination of care  A description of the counseling / coordination of care  Patient's Rights, confidentiality and exceptions to confidentiality, use of automated medical record, [facility] staff access to medical record, and consent to treatment reviewed      Zac Sierra MD

## 2019-10-23 NOTE — PROGRESS NOTES
10/23/19 0900   Team Meeting   Meeting Type Daily Rounds   Team Members Present   Team Members Present Physician;Nurse;; Other (Discipline and Name)   Physician Team Member Dr Melody Elizalde Team Member Kandice Kenny RN   Care Management Team Member Brenda Mai   Other (Discipline and Name) DIANA Cruz     Patient still preoccupied with her somatic complaints  She is not following the direction of the staff  Had a visit with her son on the unit yesterday  Slept

## 2019-10-24 ENCOUNTER — TELEPHONE (OUTPATIENT)
Dept: FAMILY MEDICINE CLINIC | Facility: CLINIC | Age: 64
End: 2019-10-24

## 2019-10-24 PROCEDURE — 99231 SBSQ HOSP IP/OBS SF/LOW 25: CPT | Performed by: PSYCHIATRY & NEUROLOGY

## 2019-10-24 RX ADMIN — CLOZAPINE 25 MG: 25 TABLET ORAL at 21:11

## 2019-10-24 RX ADMIN — THEOPHYLLINE ANHYDROUS 200 MG: 200 CAPSULE, EXTENDED RELEASE ORAL at 08:16

## 2019-10-24 RX ADMIN — CLOZAPINE 50 MG: 25 TABLET ORAL at 21:12

## 2019-10-24 RX ADMIN — PANTOPRAZOLE SODIUM 40 MG: 40 TABLET, DELAYED RELEASE ORAL at 05:37

## 2019-10-24 RX ADMIN — TIOTROPIUM BROMIDE 18 MCG: 18 CAPSULE ORAL; RESPIRATORY (INHALATION) at 08:16

## 2019-10-24 RX ADMIN — MONTELUKAST SODIUM 10 MG: 10 TABLET, COATED ORAL at 21:10

## 2019-10-24 RX ADMIN — FLUTICASONE FUROATE AND VILANTEROL TRIFENATATE 1 PUFF: 200; 25 POWDER RESPIRATORY (INHALATION) at 08:16

## 2019-10-24 RX ADMIN — FLUOXETINE 20 MG: 20 CAPSULE ORAL at 08:16

## 2019-10-24 RX ADMIN — CLOZAPINE 50 MG: 25 TABLET ORAL at 16:41

## 2019-10-24 RX ADMIN — LEVOTHYROXINE SODIUM 125 MCG: 125 TABLET ORAL at 05:37

## 2019-10-24 NOTE — PROGRESS NOTES
10/24/19 0900   Team Meeting   Meeting Type Daily Rounds   Team Members Present   Team Members Present Physician;Nurse;; Other (Discipline and Name)   Physician Team Member Dr Shane Berg Team Member Lyndsey Olivas RN   Care Management Team Member Brenda Rendon   Other (Discipline and Name) Libertad Ferrer [place]; SHANIKA Kent     Patient has not been compliant/cooperative with staff  Refused to shower  No groups  Stayed in bed except for meals and meds   Slept

## 2019-10-24 NOTE — PROGRESS NOTES
Progress Note - Sen Garvey 1955, 60 y o  female MRN: [id number]    Unit/Bed#: [place] [address] Encounter: [id number]    Primary Care Provider: Loida Millard MD   Date and time admitted to hospital: 7/23/2019  5:30 PM        * Schizoaffective disorder, bipolar type [facility])  Assessment & Plan  Psychiatry Progress Note    Patient is again psychosomatic with the fear that she is going to die here  She also claims that she has been feeling sad since last weekend she cannot pinpoint the exact reason for the same  I reminded her that maybe it is because her son is going to be deployed  to the [place] for 6 months   She has not taken any showers in the last 4 days in a row and again reminded her that she needs to take showers at least every  continues to become psychosomatic claiming that she cannot breathe or swallow and she claims that she has low blood sugar which is not based on fact even though she does appear to breathe fine while observed and swallowing study was negative  She is now on mechanical soft diet as per recommendation of GI and they have recommended outpatient surgery for hiatal hernia repair if she chooses to upon discharge  She is still  suspicious about certain staff who gives her medications like her inhalers and the spirometer off and on and now asking to see respiratory for her portable oxygen which she wants to use when she gets out of the unit even though she has not used it here at all  She is also coming up with several excuses for not taking showers consistently  She was again reminded that  if she maintains her 25% group attendance and attends to her ADLs skills,  we can always look into discharging her back to the [place] personal [facility]  She continues to present with stilted speech being too formal as if talking in a court of law which has not changed    She does not admit to any overt hallucinations or paranoia but still appears to harbor some covert paranoia based on her demeanor and interaction on the unit and does admit that she does have "psychosomatic sxs"     She again verbalizes her suspicion that that there is a micro chip on her right forearm and that it is not a fatty tumor  No current suicidal homicidal thoughts intent or plans reported  No overt hallucinations or other delusions reported   No signs or sxs of agranulocytosis or myocarditis or endocarditis and she is moving her bowels so far with no issues  Patient was again reminded to attend to ADLs skills and take showers at least every other day and attend more groups to keep up with her 25% expectation        Current medications:    Current Facility-Administered Medications:     acetaminophen (TYLENOL) tablet 650 mg, 650 mg, Oral, Q6H PRN, Greer Beltran MD    albuterol (PROVENTIL HFA,VENTOLIN HFA) inhaler 2 puff, 2 puff, Inhalation, Q4H PRN, Greer Beltran MD, 2 puff at 10/11/19 0424    aluminum-magnesium hydroxide-simethicone (MYLANTA) 200-200-20 mg/5 mL oral suspension 15 mL, 15 mL, Oral, Q4H PRN, Greer Beltran MD    ammonium lactate (LAC-HYDRIN) 12 % lotion 1 application, 1 application, Topical, BID PRN, Greer Beltran MD    benzonatate (TESSALON PERLES) capsule 100 mg, 100 mg, Oral, TID PRN, Greer Beltran MD    benztropine (COGENTIN) injection 1 mg, 1 mg, Intramuscular, Q8H PRN, Greer Beltran MD    cloZAPine (CLOZARIL) tablet 25 mg, 25 mg, Oral, HS, Greer Beltran MD, 25 mg at 10/23/19 2132    cloZAPine (CLOZARIL) tablet 50 mg, 50 mg, Oral, BID, Greer Beltran MD, 50 mg at 10/23/19 2132    EPINEPHrine PF (ADRENALIN) 1 mg/mL injection 0 15 mg, 0 15 mg, Intramuscular, Once PRN, MD Claus Damon  [START ON 10/25/2019] FLUoxetine (PROzac) capsule 30 mg, 30 mg, Oral, Daily, Greer Beltran MD    fluticasone-vilanterol (BREO ELLIPTA) 200-25 MCG/INH inhaler 1 puff, 1 puff, Inhalation, Daily, Greer Beltran MD, 1 puff at 10/24/19 0855    ketotifen (ZADITOR) 0 025 % ophthalmic solution 1 drop, 1 drop, Right Eye, BID PRN, Faheem Daniels MD    levothyroxine tablet 125 mcg, 125 mcg, Oral, Early Morning, Faheem Daniels MD, 125 mcg at 10/24/19 0537    magnesium hydroxide (MILK OF MAGNESIA) 400 mg/5 mL oral suspension 30 mL, 30 mL, Oral, Daily PRN, Faheem Daniels MD    montelukast (SINGULAIR) tablet 10 mg, 10 mg, Oral, HS, Faheem Daniels MD, 10 mg at 10/23/19 2131    OLANZapine (ZyPREXA) IM injection 5 mg, 5 mg, Intramuscular, Q8H PRN, Faheem Daniels MD    OLANZapine (ZyPREXA) tablet 5 mg, 5 mg, Oral, Q8H PRN, Faheem Daniels MD    ondansetron (ZOFRAN-ODT) dispersible tablet 4 mg, 4 mg, Oral, Q6H PRN, Faheem Daniels MD    pantoprazole (PROTONIX) EC tablet 40 mg, 40 mg, Oral, Early Morning, Faheem Daniels MD, 40 mg at 10/24/19 0537    polyethylene glycol (MIRALAX) packet 17 g, 17 g, Oral, Daily PRN, Faheem Daniels MD    polyvinyl alcohol (LIQUIFILM TEARS) 1 4 % ophthalmic solution 1 drop, 1 drop, Both Eyes, Q3H PRN, Faheem Daniels MD    theophylline (JEF-24) 24 hr capsule 200 mg, 200 mg, Oral, Daily, Faheem Daniels MD, 200 mg at 10/24/19 0816    tiotropium (SPIRIVA) capsule for inhaler 18 mcg, 18 mcg, Inhalation, Daily, Faheem Daniels MD, 18 mcg at 10/24/19 0816    traZODone (DESYREL) tablet 25 mg, 25 mg, Oral, HS PRN, Faheem Daniels MD  Justification if on more than two antipsychotics:  Only on his clozapine  Side effects if any:  None    Risks , benefits, side effects and precautions of medications discussed with patient and reminded patient to let us know any problems with medications     Objective:     Vital Signs:  Vitals:    10/23/19 0700 10/23/19 1700 10/23/19 2000 10/24/19 0730   BP: 109/67 114/73 98/72 99/54   BP Location: Left arm Left arm Left arm Left arm   Pulse: (!) 107 95 92 90   Resp: 19 17 17 18   Temp: 98 °F (36 7 °C) 98 4 °F (36 9 °C) 98 8 °F (37 1 °C) 97 5 °F (36 4 °C)   TempSrc: Temporal Temporal Temporal Temporal   SpO2:  94% 95%    Weight:       Height:         Appearance:  age appropriate, casually dressed and overweight older than stated age   Behavior:  deviant, evasive and guarded and suspicious but with good eye contact   Speech:  normal pitch and normal volume but tends to become loud at times   Mood:  anxious and dysthymic   Affect:  mood-congruent   Thought Process:  goal directed and illogical slightly pressured but able to be redirected and talks as if in a court of low being stilted   Thought Content:  delusions  grandiose and somatic about not being able to breathe despite being on nasal oxygen and paranoid about people out to harm her and Atrovent inhaler tainteded with peanut oil  No current suicidal homicidal thoughts intent or plans reported  No phobias obsessions or compulsions reported   Perceptual Disturbances: None and does not appear responding to internal stimuli   Risk Potential: Tendency to not care for herself if she goes off treatment   Sensorium:  person, place, time/date, situation, day of week, month of year and time   Cognition:  grossly intact   Consciousness:  alert and awake    Attention: Intact concentration and attention span   Intellect: Considered to be at least of average intelligence   Insight:  limited in denial   Judgment: poor      Motor Activity: no abnormal movements         Recent Labs:  Results Reviewed     None          I/O Past 24 hours:  No intake/output data recorded  No intake/output data recorded          Assessment / Plan:     Schizoaffective disorder, bipolar type ([facility] )      Reason for continued inpatient care:  Because of underlying paranoia and refusal to go back to the personal care home and inability to care for herself on her own  Acceptance by patient:  Accepting  Jabari Marie in recovery:  About living in another personal care home once she feels better  Understanding of medications :  Has some understanding  Involved in reintegration process:  Adjusting to the unit  Trusting in relatoinship with psychiatrist:  Trusting    Recommended Treatment:    Medication changes:  1) no changes today and counseled to take meds as prescribed tatiana clozapine or else to reconsider stopping it   Non-pharmacological treatments  1) continue with  individual therapy group therapy, milieu therapy and occupational therapy and milieu therapy involving multidisciplinary team approach with psychotherapist, case management, nursing, peer support services, art therapist etc using recovery principles and psycho-education about accepting illness and need for treatment   3) encourage to attend to ADLs like taking showers and wearing clean clothes   4) Encourage to attend groups   Safety  1) Safety/communication plan established targeting dynamic risk factors above  Discharge Plan most likely back to the a:  Personal care boarding home with act services once her delusions are managed and mood becomes more stabilized and she becomes more receptive to treatment compliance    Counseling / Coordination of Care    Total floor / unit time spent today 15 minutes  Greater than 50% of total time was spent with the patient and / or family counseling and / or coordination of care  A description of the counseling / coordination of care  Patient's Rights, confidentiality and exceptions to confidentiality, use of automated medical record, [facility] staff access to medical record, and consent to treatment reviewed      Jeffrey Gallegos MD

## 2019-10-24 NOTE — PROGRESS NOTES
Sleeping at this time, no s/s of distress noted  O2on 1L /min  All precautions maintained   Monitoring continues

## 2019-10-24 NOTE — PLAN OF CARE
Problem: Alteration in Thoughts and Perception  Goal: Agree to be compliant with medication regime, as prescribed and report medication side effects  Description  Interventions:  - Offer appropriate PRN medication and supervise ingestion; conduct aims, as needed   Outcome: Progressing  Goal: Attend and participate in unit activities, including therapeutic, recreational, and educational groups  Description  Interventions:  - Provide therapeutic and educational activities daily, encourage attendance and participation, and document same in the medical record     CERTIFIED PEER SPECIALIST INTERVENTIONS:    Complete peer assessment with patient to assess their needs and identify their goals to complete while in the recovery program as well as once discharged into the community  Patient will complete WRAP Plan, Crisis Plan and [facility] Life Domains  Patient will attend 50% of groups offered on the unit  Patient will complete a goal card weekly  Outcome: Progressing   Patient remains isolative only visible during medication and meal times and then retreats to her room  Lays in bed all day, refusing to get OOB for groups  Patient states she feels depressed and expressed that to Dr Robinson Stein  Order received for 30mg prozac QD  Staff encouraged patient to shower multiple times again today  However, she keeps refusing and listing multiple physical ailments as to why she cannot shower again today  All of which are psychosomatic  Continue to monitor

## 2019-10-24 NOTE — ASSESSMENT & PLAN NOTE
January 29, 2019        Regino Rios  [address] [facility] [address]        Dear Regino:               Please allow me to introduce myself, and the services available to you through your health insurance.      My name is Elly Brown, and I am one of the Nurse Navigators with [facility].    As a valuable health care resource, I bring convenient nursing services to you and your family.      My goal is to assist in optimizing your health and create a better health care experience for you.      I can be of assistance in helping you locate and select a provider, answering health related questions and coordinating care. Individuals may be referred to the Nurse Navigator program  following a hospital, emergency department or outpatient visit or by their physician.  I've included a brochure for additional information about my role and the services offered with this program.    Resources available to you through this program include:                                                                  Weight coaching/stress management                                                           Medication review by a licensed pharmacist                                                                         There is no charge to you for my services.  Please know that I am an employee of [facility], and like all health care matters, all information is kept confidential.    Sincerely,    Elly Brown, BSN, RN  Nurse Navigator-[facility]  [phone]                    Psychiatry Progress Note    Patient is again psychosomatic with the fear that she is going to die here  She also claims that she has been feeling sad since last weekend she cannot pinpoint the exact reason for the same  I reminded her that maybe it is because her son is going to be deployed  to the [place] for 6 months   She has not taken any showers in the last 4 days in a row and again reminded her that she needs to take showers at least every  continues to become psychosomatic claiming that she cannot breathe or swallow and she claims that she has low blood sugar which is not based on fact even though she does appear to breathe fine while observed and swallowing study was negative  She is now on mechanical soft diet as per recommendation of GI and they have recommended outpatient surgery for hiatal hernia repair if she chooses to upon discharge  She is still  suspicious about certain staff who gives her medications like her inhalers and the spirometer off and on and now asking to see respiratory for her portable oxygen which she wants to use when she gets out of the unit even though she has not used it here at all  She is also coming up with several excuses for not taking showers consistently  She was again reminded that  if she maintains her 25% group attendance and attends to her ADLs skills,  we can always look into discharging her back to the [place] personal care boarding home again  She continues to present with stilted speech being too formal as if talking in a court of law which has not changed  She does not admit to any overt hallucinations or paranoia but still appears to harbor some covert  paranoia based on her demeanor and interaction on the unit and does admit that she does have "psychosomatic sxs"     She again verbalizes her suspicion that that there is a micro chip on her right forearm and that it is not a fatty tumor  No current suicidal homicidal thoughts intent or plans reported  No overt hallucinations or other delusions reported   No signs or sxs of agranulocytosis or myocarditis or endocarditis and she is moving her bowels so far with no issues  Patient was again reminded to attend to ADLs skills and take showers at least every other day and attend more groups to keep up with her 25% expectation        Current medications:    Current Facility-Administered Medications:     acetaminophen (TYLENOL) tablet 650 mg, 650 mg, Oral, Q6H PRN, Zander Yanez MD    albuterol (PROVENTIL HFA,VENTOLIN HFA) inhaler 2 puff, 2 puff, Inhalation, Q4H PRN, Zander Ynaez MD, 2 puff at 10/11/19 0424    aluminum-magnesium hydroxide-simethicone (MYLANTA) 200-200-20 mg/5 mL oral suspension 15 mL, 15 mL, Oral, Q4H PRN, Zander Yanez MD    ammonium lactate (LAC-HYDRIN) 12 % lotion 1 application, 1 application, Topical, BID PRN, Zander Yanez MD    benzonatate (TESSALON PERLES) capsule 100 mg, 100 mg, Oral, TID PRN, Zander Yanez MD    benztropine (COGENTIN) injection 1 mg, 1 mg, Intramuscular, Q8H PRN, Zander Yanez MD    cloZAPine (CLOZARIL) tablet 25 mg, 25 mg, Oral, HS, Zander Yanez MD, 25 mg at 10/23/19 2132    cloZAPine (CLOZARIL) tablet 50 mg, 50 mg, Oral, BID, Zander Yanez MD, 50 mg at 10/23/19 2132    EPINEPHrine PF (ADRENALIN) 1 mg/mL injection 0 15 mg, 0 15 mg, Intramuscular, Once PRN, Zander Yanez MD  [place]  [START ON 10/25/2019] FLUoxetine (PROzac) capsule 30 mg, 30 mg, Oral, Daily, Zander Yanez MD    fluticasone-vilanterol (BREO ELLIPTA) 200-25 MCG/INH inhaler 1 puff, 1 puff, Inhalation, Daily, Zander Yanez MD, 1 puff at 10/24/19 0816    ketotifen (ZADITOR) 0 025 % ophthalmic solution 1 drop, 1 drop, Right Eye, BID PRN, Zander Yanez MD    levothyroxine tablet 125 mcg, 125 mcg, Oral, Early Morning, Zander Yanez MD, 125 mcg at 10/24/19 0537    magnesium hydroxide (MILK OF MAGNESIA) 400 mg/5 mL oral suspension 30 mL, 30 mL, Oral, Daily PRN, Zander Yanez MD    montelukast (SINGULAIR) tablet 10 mg, 10 mg, Oral, HS, Tree Madden MD, 10 mg at 10/23/19 2131    OLANZapine (ZyPREXA) IM injection 5 mg, 5 mg, Intramuscular, Q8H PRN, Tree Madden MD    OLANZapine (ZyPREXA) tablet 5 mg, 5 mg, Oral, Q8H PRN, Tree Madden MD    ondansetron (ZOFRAN-ODT) dispersible tablet 4 mg, 4 mg, Oral, Q6H PRN, Tere Madden MD    pantoprazole (PROTONIX) EC tablet 40 mg, 40 mg, Oral, Early Morning, Tree Madden MD, 40 mg at 10/24/19 0537    polyethylene glycol (MIRALAX) packet 17 g, 17 g, Oral, Daily PRN, Tree Madden MD    polyvinyl alcohol (LIQUIFILM TEARS) 1 4 % ophthalmic solution 1 drop, 1 drop, Both Eyes, Q3H PRN, Tree Madden MD    theophylline (JEF-24) 24 hr capsule 200 mg, 200 mg, Oral, Daily, Tree Madden MD, 200 mg at 10/24/19 0816    tiotropium (SPIRIVA) capsule for inhaler 18 mcg, 18 mcg, Inhalation, Daily, Tree Madden MD, 18 mcg at 10/24/19 0816    traZODone (DESYREL) tablet 25 mg, 25 mg, Oral, HS PRN, Tree Madden MD  Justification if on more than two antipsychotics:  Only on his clozapine  Side effects if any:  None    Risks , benefits, side effects and precautions of medications discussed with patient and reminded patient to let us know any problems with medications     Objective:     Vital Signs:  Vitals:    10/23/19 0700 10/23/19 1700 10/23/19 2000 10/24/19 0730   BP: 109/67 114/73 98/72 99/54   BP Location: Left arm Left arm Left arm Left arm   Pulse: (!) 107 95 92 90   Resp: 19 17 17 18   Temp: 98 °F (36 7 °C) 98 4 °F (36 9 °C) 98 8 °F (37 1 °C) 97 5 °F (36 4 °C)   TempSrc: Temporal Temporal Temporal Temporal   SpO2:  94% 95%    Weight:       Height:         Appearance:  age appropriate, casually dressed and overweight older than stated age   Behavior:  deviant, evasive and guarded and suspicious but with good eye contact   Speech:  normal pitch and normal volume but tends to become loud at times   Mood:  anxious and dysthymic   Affect:  mood-congruent   Thought Process:  goal directed and illogical slightly pressured but able to be redirected and talks as if in a court of low being stilted   Thought Content:  delusions  grandiose and somatic about not being able to breathe despite being on nasal oxygen and paranoid about people out to harm her and Atrovent inhaler tainteded with peanut oil  No current suicidal homicidal thoughts intent or plans reported  No phobias obsessions or compulsions reported   Perceptual Disturbances: None and does not appear responding to internal stimuli   Risk Potential: Tendency to not care for herself if she goes off treatment   Sensorium:  person, place, time/date, situation, day of week, month of year and time   Cognition:  grossly intact   Consciousness:  alert and awake    Attention: Intact concentration and attention span   Intellect: Considered to be at least of average intelligence   Insight:  limited in denial   Judgment: poor      Motor Activity: no abnormal movements         Recent Labs:  Results Reviewed     None          I/O Past 24 hours:  No intake/output data recorded  No intake/output data recorded          Assessment / Plan:     Schizoaffective disorder, bipolar type ([facility] )      Reason for continued inpatient care:  Because of underlying paranoia and refusal to go back to the personal care home and inability to care for herself on her own  Acceptance by patient:  Accepting  Choco Anna in recovery:  About living in another personal care home once she feels better  Understanding of medications :  Has some understanding  Involved in reintegration process:  Adjusting to the unit  Trusting in relatoinship with psychiatrist:  Trusting    Recommended Treatment:    Medication changes:  1) no changes today and counseled to take meds as prescribed tatiana clozapine or else to reconsider stopping it   Non-pharmacological treatments  1) continue with  individual therapy group therapy, milieu therapy and occupational therapy and milieu therapy involving multidisciplinary team approach with psychotherapist, case management, nursing, peer support services, art therapist etc using recovery principles and psycho-education about accepting illness and need for treatment   3) encourage to attend to ADLs like taking showers and wearing clean clothes   4) Encourage to attend groups   Safety  1) Safety/communication plan established targeting dynamic risk factors above  Discharge Plan most likely back to the a:  Personal care boarding home with act services once her delusions are managed and mood becomes more stabilized and she becomes more receptive to treatment compliance    Counseling / Coordination of Care    Total floor / unit time spent today 15 minutes  Greater than 50% of total time was spent with the patient and / or family counseling and / or coordination of care  A description of the counseling / coordination of care  Patient's Rights, confidentiality and exceptions to confidentiality, use of automated medical record, [facility] record, and consent to treatment reviewed      Shilpa Trujillo MD

## 2019-10-24 NOTE — PROGRESS NOTES
Patient declined     10/24/19 1100   Activity/Group Checklist   Group   (IMR/Benefits of Coping Skills )   Attendance Did not attend   Attendance Duration (min) 46-60   Affect/Mood IRMA

## 2019-10-25 LAB
ALBUMIN SERPL BCP-MCNC: 3.4 G/DL (ref 3–5.2)
ALP SERPL-CCNC: 104 U/L (ref 43–122)
ALT SERPL W P-5'-P-CCNC: 25 U/L (ref 9–52)
ANION GAP SERPL CALCULATED.3IONS-SCNC: 5 MMOL/L (ref 5–14)
AST SERPL W P-5'-P-CCNC: 23 U/L (ref 14–36)
BASOPHILS # BLD AUTO: 0.1 THOUSANDS/ΜL (ref 0–0.1)
BASOPHILS NFR BLD AUTO: 1 % (ref 0–1)
BILIRUB SERPL-MCNC: 0.3 MG/DL
BUN SERPL-MCNC: 16 MG/DL (ref 5–25)
CALCIUM SERPL-MCNC: 9.1 MG/DL (ref 8.4–10.2)
CHLORIDE SERPL-SCNC: 106 MMOL/L (ref 97–108)
CHOLEST SERPL-MCNC: 210 MG/DL
CO2 SERPL-SCNC: 29 MMOL/L (ref 22–30)
CREAT SERPL-MCNC: 0.75 MG/DL (ref 0.6–1.2)
EOSINOPHIL # BLD AUTO: 0.1 THOUSAND/ΜL (ref 0–0.4)
EOSINOPHIL NFR BLD AUTO: 2 % (ref 0–6)
ERYTHROCYTE [DISTWIDTH] IN BLOOD BY AUTOMATED COUNT: 13.4 %
GFR SERPL CREATININE-BSD FRML MDRD: 86 ML/MIN/1.73SQ M
GLUCOSE P FAST SERPL-MCNC: 93 MG/DL (ref 70–99)
GLUCOSE SERPL-MCNC: 93 MG/DL (ref 70–99)
HCT VFR BLD AUTO: 42.4 % (ref 36–46)
HDLC SERPL-MCNC: 38 MG/DL
HGB BLD-MCNC: 14 G/DL (ref 12–16)
LDLC SERPL CALC-MCNC: 145 MG/DL
LYMPHOCYTES # BLD AUTO: 2.3 THOUSANDS/ΜL (ref 0.5–4)
LYMPHOCYTES NFR BLD AUTO: 34 % (ref 25–45)
MCH RBC QN AUTO: 30.4 PG (ref 26–34)
MCHC RBC AUTO-ENTMCNC: 33 G/DL (ref 31–36)
MCV RBC AUTO: 92 FL (ref 80–100)
MONOCYTES # BLD AUTO: 0.7 THOUSAND/ΜL (ref 0.2–0.9)
MONOCYTES NFR BLD AUTO: 10 % (ref 1–10)
NEUTROPHILS # BLD AUTO: 3.6 THOUSANDS/ΜL (ref 1.8–7.8)
NEUTS SEG NFR BLD AUTO: 53 % (ref 45–65)
NONHDLC SERPL-MCNC: 172 MG/DL
PLATELET # BLD AUTO: 216 THOUSANDS/UL (ref 150–450)
PMV BLD AUTO: 9.3 FL (ref 8.9–12.7)
POTASSIUM SERPL-SCNC: 4.1 MMOL/L (ref 3.6–5)
PROT SERPL-MCNC: 6.3 G/DL (ref 5.9–8.4)
RBC # BLD AUTO: 4.61 MILLION/UL (ref 4–5.2)
SODIUM SERPL-SCNC: 140 MMOL/L (ref 137–147)
TRIGL SERPL-MCNC: 134 MG/DL
WBC # BLD AUTO: 6.7 THOUSAND/UL (ref 4.5–11)

## 2019-10-25 PROCEDURE — 85025 COMPLETE CBC W/AUTO DIFF WBC: CPT | Performed by: PSYCHIATRY & NEUROLOGY

## 2019-10-25 PROCEDURE — 99231 SBSQ HOSP IP/OBS SF/LOW 25: CPT | Performed by: PSYCHIATRY & NEUROLOGY

## 2019-10-25 PROCEDURE — 80053 COMPREHEN METABOLIC PANEL: CPT | Performed by: PSYCHIATRY & NEUROLOGY

## 2019-10-25 PROCEDURE — 80061 LIPID PANEL: CPT | Performed by: PSYCHIATRY & NEUROLOGY

## 2019-10-25 RX ADMIN — THEOPHYLLINE ANHYDROUS 200 MG: 200 CAPSULE, EXTENDED RELEASE ORAL at 08:26

## 2019-10-25 RX ADMIN — PANTOPRAZOLE SODIUM 40 MG: 40 TABLET, DELAYED RELEASE ORAL at 06:00

## 2019-10-25 RX ADMIN — CLOZAPINE 50 MG: 25 TABLET ORAL at 21:26

## 2019-10-25 RX ADMIN — CLOZAPINE 50 MG: 25 TABLET ORAL at 17:49

## 2019-10-25 RX ADMIN — TIOTROPIUM BROMIDE 18 MCG: 18 CAPSULE ORAL; RESPIRATORY (INHALATION) at 08:27

## 2019-10-25 RX ADMIN — CLOZAPINE 25 MG: 25 TABLET ORAL at 21:26

## 2019-10-25 RX ADMIN — LEVOTHYROXINE SODIUM 125 MCG: 125 TABLET ORAL at 06:00

## 2019-10-25 RX ADMIN — FLUTICASONE FUROATE AND VILANTEROL TRIFENATATE 1 PUFF: 200; 25 POWDER RESPIRATORY (INHALATION) at 08:27

## 2019-10-25 RX ADMIN — FLUOXETINE 30 MG: 20 CAPSULE ORAL at 08:26

## 2019-10-25 NOTE — PROGRESS NOTES
Progress Note - Gigi Jenkins 1955, 60 y o  female MRN: [id number]    Unit/Bed#: [facility] 110-02 Encounter: [id number]    Primary Care Provider: Bonnee Favre, PA-C   Date and time admitted to hospital: 7/23/2019  5:30 PM        * Schizoaffective disorder, bipolar type [facility])  Assessment & Plan  Psychiatry Progress Note    Patient is again psychosomatic with the fear that she is going to die here and has refused to take showers for the last 6 days in a row because of multiple excuses  She also claims that she has been feeling sad since last weekend she cannot pinpoint the exact reason for the same  I reminded her that maybe it is because her son is going to be deployed  to the [place] for 6 months   She continues to become psychosomatic claiming that she cannot breathe or swallow and she claims that she has low blood sugar which is not based on fact even though she does appear to breathe fine while observed and swallowing study was negative  She is now on mechanical soft diet as per recommendation of GI and they have recommended outpatient surgery for hiatal hernia repair if she chooses to upon discharge  She is still  suspicious about certain staff who gives her medications like her inhalers and the spirometer off and on and now asking to see respiratory for her portable oxygen which she wants to use when she gets out of the unit even though she has not used it here at all  She is also coming up with several excuses for not taking showers consistently  She was again reminded that  if she maintains her 25% group attendance and attends to her ADLs skills,  we can always look into discharging her [facility]  She continues to present with stilted speech being too formal as if talking in a court of law which has not changed    She does not admit to any overt hallucinations or paranoia but still appears to harbor some covert  paranoia based on her demeanor and interaction on the unit and does admit that she does have "psychosomatic sxs"     She again verbalizes her suspicion that that there is a micro chip on her right forearm and that it is not a fatty tumor and is accused of the therapist of trying to steal a medical doctor whom she believes that she is in love with  No current suicidal homicidal thoughts intent or plans reported  No overt hallucinations or other delusions reported   No signs or sxs of agranulocytosis or myocarditis or endocarditis and she is moving her bowels so far with no issues  Patient was again reminded to attend to ADLs skills and take showers at least every other day and attend more groups to keep up with her 25% expectation and she needs lot of prompts and reminders and was told that the expectation is that she is going to take a shower later today        Current medications:    Current Facility-Administered Medications:     acetaminophen (TYLENOL) tablet 650 mg, 650 mg, Oral, Q6H PRN, Sindy Day MD    albuterol (PROVENTIL HFA,VENTOLIN HFA) inhaler 2 puff, 2 puff, Inhalation, Q4H PRN, Sindy Day MD, 2 puff at 10/11/19 0424    aluminum-magnesium hydroxide-simethicone (MYLANTA) 200-200-20 mg/5 mL oral suspension 15 mL, 15 mL, Oral, Q4H PRN, Sindy Day MD    ammonium lactate (LAC-HYDRIN) 12 % lotion 1 application, 1 application, Topical, BID PRN, Sindy Day MD    benzonatate (TESSALON PERLES) capsule 100 mg, 100 mg, Oral, TID PRN, Sindy Day MD    benztropine (COGENTIN) injection 1 mg, 1 mg, Intramuscular, Q8H PRN, Sindy Day MD    cloZAPine (CLOZARIL) tablet 25 mg, 25 mg, Oral, HS, Sindy Day MD, 25 mg at 10/24/19 2111    cloZAPine (CLOZARIL) tablet 50 mg, 50 mg, Oral, BID, Sindy Day MD, 50 mg at 10/24/19 2112    EPINEPHrine PF (ADRENALIN) 1 mg/mL injection 0 15 mg, 0 15 mg, Intramuscular, Once PRN, Sindy Day MD    FLUoxetine (PROzac) capsule 30 mg, 30 mg, Oral, Daily, Sindy Day MD, 30 mg at 10/25/19 6025   fluticasone-vilanterol (BREO ELLIPTA) 200-25 MCG/INH inhaler 1 puff, 1 puff, Inhalation, Daily, Allie Guzman MD, 1 puff at 10/25/19 0827    ketotifen (ZADITOR) 0 025 % ophthalmic solution 1 drop, 1 drop, Right Eye, BID PRN, Allie Guzman MD    levothyroxine tablet 125 mcg, 125 mcg, Oral, Early Morning, Allie Guzman MD, 125 mcg at 10/25/19 0600    magnesium hydroxide (MILK OF MAGNESIA) 400 mg/5 mL oral suspension 30 mL, 30 mL, Oral, Daily PRN, Allie Guzman MD    montelukast (SINGULAIR) tablet 10 mg, 10 mg, Oral, HS, Allie Guzman MD, 10 mg at 10/24/19 2110    OLANZapine (ZyPREXA) IM injection 5 mg, 5 mg, Intramuscular, Q8H PRN, Allie Guzman MD    OLANZapine (ZyPREXA) tablet 5 mg, 5 mg, Oral, Q8H PRN, Allie Guzman MD    ondansetron (ZOFRAN-ODT) dispersible tablet 4 mg, 4 mg, Oral, Q6H PRN, Allie Guzman MD    pantoprazole (PROTONIX) EC tablet 40 mg, 40 mg, Oral, Early Morning, Allie Guzman MD, 40 mg at 10/25/19 0600    polyethylene glycol (MIRALAX) packet 17 g, 17 g, Oral, Daily PRN, Allie Guzman MD    polyvinyl alcohol (LIQUIFILM TEARS) 1 4 % ophthalmic solution 1 drop, 1 drop, Both Eyes, Q3H PRN, Allie Guzman MD    theophylline (JEF-24) 24 hr capsule 200 mg, 200 mg, Oral, Daily, Allie Guzman MD, 200 mg at 10/25/19 0826    tiotropium (SPIRIVA) capsule for inhaler 18 mcg, 18 mcg, Inhalation, Daily, Allie Guzman MD, 18 mcg at 10/25/19 0827    traZODone (DESYREL) tablet 25 mg, 25 mg, Oral, HS PRN, Allie Guzman MD  Justification if on more than two antipsychotics:  Only on his clozapine  Side effects if any:  None    Risks , benefits, side effects and precautions of medications discussed with patient and reminded patient to let us know any problems with medications     Objective:     Vital Signs:  Vitals:    10/24/19 1608 10/24/19 1926 10/24/19 2142 10/25/19 0730   BP: 105/66 115/77  114/74   BP Location: Left arm Right arm  Right arm   Pulse: 87 100  94   Resp: 16 18  18   Temp: 97 8 °F (36 6 °C) (!) 97 2 °F (36 2 °C)  97 7 °F (36 5 °C)   TempSrc: Temporal Temporal     SpO2: 94% 93% 97%    Weight:       Height:         Appearance:  age appropriate, casually dressed and overweight older than stated age   Behavior:  deviant, evasive and guarded and suspicious but with good eye contact   Speech:  normal pitch and normal volume but tends to become loud at times   Mood:  anxious and dysthymic   Affect:  mood-congruent   Thought Process:  goal directed and illogical slightly pressured but able to be redirected and talks as if in a court of low being stilted   Thought Content:  delusions  grandiose and somatic about not being able to breathe despite being on nasal oxygen and paranoid about people out to harm her and Atrovent inhaler tainteded with peanut oil  No current suicidal homicidal thoughts intent or plans reported  No phobias obsessions or compulsions reported   Perceptual Disturbances: None and does not appear responding to internal stimuli   Risk Potential: Tendency to not care for herself if she goes off treatment   Sensorium:  person, place, time/date, situation, day of week, month of year and time   Cognition:  grossly intact   Consciousness:  alert and awake    Attention: Intact concentration and attention span   Intellect: Considered to be at least of average intelligence   Insight:  limited in denial   Judgment: poor      Motor Activity: no abnormal movements         Recent Labs:  Results Reviewed     None          I/O Past 24 hours:  No intake/output data recorded  No intake/output data recorded          Assessment / Plan:     Schizoaffective disorder, bipolar type ([facility] )      Reason for continued inpatient care:  Because of underlying paranoia and refusal to go back to the personal care home and inability to care for herself on her own  Acceptance by patient:  Accepting  Ros Patel in recovery:  About living in another personal care home once she feels better  Understanding of medications :  Has some understanding  Involved in reintegration process:  Adjusting to the unit  Trusting in relatoinship with psychiatrist:  Trusting    Recommended Treatment:    Medication changes:  1) no changes today and counseled to take meds as prescribed tatiana clozapine or else to reconsider stopping it   Non-pharmacological treatments  1) continue with  individual therapy group therapy, milieu therapy and occupational therapy and milieu therapy involving multidisciplinary team approach with psychotherapist, case management, nursing, peer support services, art therapist etc using recovery principles and psycho-education about accepting illness and need for treatment   3) encourage to attend to ADLs like taking showers and wearing clean clothes   4) Encourage to attend groups   Safety  1) Safety/communication plan established targeting dynamic risk factors above  Discharge Plan most likely back to the a:  Personal care boarding home with act services once her delusions are managed and mood becomes more stabilized and she becomes more receptive to treatment compliance    Counseling / Coordination of Care    Total floor / unit time spent today 15 minutes  Greater than 50% of total time was spent with the patient and / or family counseling and / or coordination of care  A description of the counseling / coordination of care  Patient's Rights, confidentiality and exceptions to confidentiality, use of automated medical record, [address] [place] [address] staff access to medical record, and consent to treatment reviewed      Sarah Hanna MD

## 2019-10-25 NOTE — PROGRESS NOTES
Writer called [facility] office, [phone] to follow up from 10/18/19, inquiring about obtaining a portable O2 unit  Writer informed that Arnold Quinones was not available  Message left for her to return call to [facility]  and provide an update

## 2019-10-25 NOTE — PROGRESS NOTES
Pharmacy Clozapine Monitoring Progress Note     Joseph Rodriguez is receiving a total daily dose of 125 mg of clozapine divided as 50mg twice daily and 25mg at bedtime  Pharmacist Recommendations Based on Assessment and Plan (below):    1  Patient is Clozapine-REMS eligible, ANC was updated, with weekly monitoring frequency and the next [address] is due on 11/1/2019       2  Recommend prophylactic bowel regimen  3  Recommend repeat ECG for myocarditis monitoring and QTC prolongation due to antipsychotic  Assessment/Plan:    Phase of Treatment:     Current phase of treatment is initation/titration  Patient Clozapine REMS Eligibility:     Patient is eligible to receive clozapine and the [address] monitoring frequency is weekly per clozapine REMS  The patient's latest [address] value has been updated into the Clozapine-REMS program          ANC and Clozapine Level (if available)     The most recent [address] was   Lab Results   Component Value Date    NEUTROABS 3 60 10/25/2019    and the next lab is due on 11/1/19  Last Clozapine level (if available):    0   Lab Value Date/Time    CLOZAPINE 324 (L) 08/16/2019 1131     0   Lab Value Date/Time    NCLOZIP 207 08/16/2019 1131           Monitoring Parameters for Clozapine:     Clozapine Adverse Effect Suggested Monitoring Patient's Current Status    Agranulocytosis ANC baseline and then repeat weekly for first 6 months and every 2 weeks for the second 6 months  Maintenance after one year of therapy every month  The most recent [address] was   Lab Results   Component Value Date    NEUTROABS 3 60 10/25/2019       This ANC is considered normal range (ANC >/= 1500/mcL)  Continue current treatment and monitoring course     Respiratory Depression Please ensure patient is not on concomitant respiratory lowering medications such as benzodiazepines, sedative hypnotics (eg  zolpidem) This patient is not on respiratory depression lowering medications   Myocarditis Baseline and weekly EKG, CRP, BNP, and troponin  Weekly symptomatic complaints of fatigue, dyspnea, tachycardia, chest pain, palpitations, and fever for the first 4 weeks  Last EKG Results on  8/21/19 showed:  T wave abnormalities    Last QTC value was:  0   Lab Value Date/Time    QTCINT 427 08/21/2019 1133       Last BNP was: No results found for: NTBNP    Last Troponin was:  0   Lab Value Date/Time    TROPONINI <0 01 05/01/2019 1318    TROPONINI <0 04 05/10/2015 1948        Orthostatic hypotension/  bradycardia Blood pressure and vital signs      Monitor blood pressure and orthostatic hypotension at least twice daily upon initiation and continue to follow at least once daily afterwards  Patient's last recorded vital signs:       /74 (BP Location: Right arm) Comment: st 100/54 pulse 99  Pulse 94   Temp 97 7 °F (36 5 °C)   Resp 18   Ht 5' 4" (1 626 m)   Wt 67 7 kg (149 lb 3 2 oz)   SpO2 97%   BMI 25 61 kg/m²             Constipation (bowel obstruction due to high anticholinergic clozapine burden) Assess at baseline and weekly during first four months of therapy  Docusate 100mg BID and Miralax 17 grams daily recommended initially  Prophylactic bowel regimen not ordered and it is recommended to order a standing bowel regimen to reduce risk of bowel obstruction associated with clozapine therapy  Sialorrhea Assess at baseline and weekly during first four months of therapy  May be managed using sublingual anticholinergic preparations (atropine 1% eye drops)  Patient is not experiencing sialorrhea  This will continue to be monitored  Please note that per current REMS protocol the provider can submit a treatment rationale that justifies clozapine treatment even if patient parameters are outside of REMS recommendations  The Clozapine REMS is an FDA-mandated program implemented by the manufacturers of clozapine  ([employer]  com/CpmgClozapineUI/home  u)            Pharmacy will continue to follow patient with team, thank you    Electronically signed by: Tae Pittman, PharmD, [id number], Clinical Pharmacist - Psychiatry

## 2019-10-25 NOTE — ASSESSMENT & PLAN NOTE
Psychiatry Progress Note    Patient is again psychosomatic with the fear that she is going to die here and has refused to take showers for the last 6 days in a row because of multiple excuses  She also claims that she has been feeling sad since last weekend she cannot pinpoint the exact reason for the same  I reminded her that maybe it is because her son is going to be deployed  to the [place] for 6 months   She continues to become psychosomatic claiming that she cannot breathe or swallow and she claims that she has low blood sugar which is not based on fact even though she does appear to breathe fine while observed and swallowing study was negative  She is now on mechanical soft diet as per recommendation of GI and they have recommended outpatient surgery for hiatal hernia repair if she chooses to upon discharge  She is still  suspicious about certain staff who gives her medications like her inhalers and the spirometer off and on and now asking to see respiratory for her portable oxygen which she wants to use when she gets out of the unit even though she has not used it here at all  She is also coming up with several excuses for not taking showers consistently  She was again reminded that  if she maintains her 25% group attendance and attends to her ADLs skills,  we can always look into discharging her back [facility]  She continues to present with stilted speech being too formal as if talking in a court of law which has not changed  She does not admit to any overt hallucinations or paranoia but still appears to harbor some covert  paranoia based on her demeanor and interaction on the unit and does admit that she does have "psychosomatic sxs"      She again verbalizes her suspicion that that there is a micro chip on her right forearm and that it is not a fatty tumor and is accused of the therapist of trying to steal a medical doctor whom she believes that she is in love with  No current suicidal homicidal thoughts intent or plans reported  No overt hallucinations or other delusions reported   No signs or sxs of agranulocytosis or myocarditis or endocarditis and she is moving her bowels so far with no issues  Patient was again reminded to attend to ADLs skills and take showers at least every other day and attend more groups to keep up with her 25% expectation and she needs lot of prompts and reminders and was told that the expectation is that she is going to take a shower later today        Current medications:    Current Facility-Administered Medications:     acetaminophen (TYLENOL) tablet 650 mg, 650 mg, Oral, Q6H PRN, Sindy Day MD    albuterol (PROVENTIL HFA,VENTOLIN HFA) inhaler 2 puff, 2 puff, Inhalation, Q4H PRN, Sindy Day MD, 2 puff at 10/11/19 0424    aluminum-magnesium hydroxide-simethicone (MYLANTA) 200-200-20 mg/5 mL oral suspension 15 mL, 15 mL, Oral, Q4H PRN, Sindy Day MD    ammonium lactate (LAC-HYDRIN) 12 % lotion 1 application, 1 application, Topical, BID PRN, Sindy Day MD    benzonatate (TESSALON PERLES) capsule 100 mg, 100 mg, Oral, TID PRN, Sindy Day MD    benztropine (COGENTIN) injection 1 mg, 1 mg, Intramuscular, Q8H PRN, Sindy Day MD    cloZAPine (CLOZARIL) tablet 25 mg, 25 mg, Oral, HS, Sindy Day MD, 25 mg at 10/24/19 2111    cloZAPine (CLOZARIL) tablet 50 mg, 50 mg, Oral, BID, Sindy Day MD, 50 mg at 10/24/19 2112    EPINEPHrine PF (ADRENALIN) 1 mg/mL injection 0 15 mg, 0 15 mg, Intramuscular, Once PRN, Sindy Day MD    FLUoxetine (PROzac) capsule 30 mg, 30 mg, Oral, Daily, Sindy Day MD, 30 mg at 10/25/19 0826    fluticasone-vilanterol (BREO ELLIPTA) 200-25 MCG/INH inhaler 1 puff, 1 puff, Inhalation, Daily, Sindy Day MD, 1 puff at 10/25/19 0827    ketotifen (ZADITOR) 0 025 % ophthalmic solution 1 drop, 1 drop, Right Eye, BID PRN, Sindy Day MD    levothyroxine tablet 125 mcg, 125 mcg, Oral, Early Morning, Isidoro Gonzalez MD, 125 mcg at 10/25/19 0600    magnesium hydroxide (MILK OF MAGNESIA) 400 mg/5 mL oral suspension 30 mL, 30 mL, Oral, Daily PRN, Isidoro Gonzalez MD    montelukast (SINGULAIR) tablet 10 mg, 10 mg, Oral, HS, Isidoro Gonzalez MD, 10 mg at 10/24/19 2110    OLANZapine (ZyPREXA) IM injection 5 mg, 5 mg, Intramuscular, Q8H PRN, Isidoro Gonzalez MD    OLANZapine (ZyPREXA) tablet 5 mg, 5 mg, Oral, Q8H PRN, Isidoro Gonzalez MD    ondansetron (ZOFRAN-ODT) dispersible tablet 4 mg, 4 mg, Oral, Q6H PRN, Isidoro Gonzalez MD    pantoprazole (PROTONIX) EC tablet 40 mg, 40 mg, Oral, Early Morning, Isidoro Gonzalez MD, 40 mg at 10/25/19 0600    polyethylene glycol (MIRALAX) packet 17 g, 17 g, Oral, Daily PRN, Isidoro Gonzalez MD    polyvinyl alcohol (LIQUIFILM TEARS) 1 4 % ophthalmic solution 1 drop, 1 drop, Both Eyes, Q3H PRN, Isidoro Gonzalez MD    theophylline (JEF-24) 24 hr capsule 200 mg, 200 mg, Oral, Daily, Isidoro Gonzalez MD, 200 mg at 10/25/19 0826    tiotropium (SPIRIVA) capsule for inhaler 18 mcg, 18 mcg, Inhalation, Daily, Isidoro Gonzalez MD, 18 mcg at 10/25/19 0827    traZODone (DESYREL) tablet 25 mg, 25 mg, Oral, HS PRN, Isidoro Gonzalez MD  Justification if on more than two antipsychotics:  Only on his clozapine  Side effects if any:  None    Risks , benefits, side effects and precautions of medications discussed with patient and reminded patient to let us know any problems with medications     Objective:     Vital Signs:  Vitals:    10/24/19 1608 10/24/19 1926 10/24/19 2142 10/25/19 0730   BP: 105/66 115/77  114/74   BP Location: Left arm Right arm  Right arm   Pulse: 87 100  94   Resp: 16 18  18   Temp: 97 8 °F (36 6 °C) (!) 97 2 °F (36 2 °C)  97 7 °F (36 5 °C)   TempSrc: Temporal Temporal     SpO2: 94% 93% 97%    Weight:       Height:         Appearance:  age appropriate, casually dressed and overweight older than stated age   Behavior:  deviant, evasive and guarded and suspicious but with good eye contact   Speech:  normal pitch and normal volume but tends to become loud at times   Mood:  anxious and dysthymic   Affect:  mood-congruent   Thought Process:  goal directed and illogical slightly pressured but able to be redirected and talks as if in a court of low being stilted   Thought Content:  delusions  grandiose and somatic about not being able to breathe despite being on nasal oxygen and paranoid about people out to harm her and Atrovent inhaler tainteded with peanut oil  No current suicidal homicidal thoughts intent or plans reported  No phobias obsessions or compulsions reported   Perceptual Disturbances: None and does not appear responding to internal stimuli   Risk Potential: Tendency to not care for herself if she goes off treatment   Sensorium:  person, place, time/date, situation, day of week, month of year and time   Cognition:  grossly intact   Consciousness:  alert and awake    Attention: Intact concentration and attention span   Intellect: Considered to be at least of average intelligence   Insight:  limited in denial   Judgment: poor      Motor Activity: no abnormal movements         Recent Labs:  Results Reviewed     None          I/O Past 24 hours:  No intake/output data recorded  No intake/output data recorded          Assessment / Plan:     Schiz[facility]ffective [facility]      Reason for continued inpatient care:  Because of underlying paranoia and refusal to go back to the personal care home and inability to care for herself on her own  Acceptance by patient:  Accepting  Clara Arredondo in recovery:  About living in another personal care home once she feels better  Understanding of medications :  Has some understanding  Involved in reintegration process:  Adjusting to the unit  Trusting in relatoinship with psychiatrist:  Trusting    Recommended Treatment:    Medication changes:  1) no changes today and counseled to take meds as prescribed tatiana clozapine or else to reconsider stopping it   Non-pharmacological treatments  1) continue with  individual therapy group therapy, milieu therapy and occupational therapy and milieu therapy involving multidisciplinary team approach with psychotherapist, case management, nursing, peer support services, art therapist etc using recovery principles and psycho-education about accepting illness and need for treatment   3) encourage to attend to ADLs like taking showers and wearing clean clothes   4) Encourage to attend groups   Safety  1) Safety/communication plan established targeting dynamic risk factors above  Discharge Plan most likely back to the a:  Personal care boarding home with [facility] services once her delusions are managed and mood becomes more stabilized and she becomes more receptive to treatment compliance    Counseling / Coordination of Care    Total floor / unit time spent today 15 minutes  Greater than 50% of total time was spent with the patient and / or family counseling and / or coordination of care  A description of the counseling / coordination of care  Patient's Rights, confidentiality and exceptions to confidentiality, use of automated medical record, [facility] staff access to medical record, and consent to treatment reviewed      Zander Yanez MD

## 2019-10-25 NOTE — PROGRESS NOTES
10/25/19 0800   Team Meeting   Meeting Type Daily Rounds   Team Members Present   Team Members Present Physician;Nurse;; Other (Discipline and Name)   Physician Team Member Dr Pia Edwards Team Member Roverto Gonzalez RN   Care Management Team Member Brenda Johnson   Other (Discipline and Name) Hollie Sharpe [place]     Patient did not attend any groups  Last shower was on 10/19  Reports feeling anxious  Slept

## 2019-10-25 NOTE — PROGRESS NOTES
Sleeping at this time, no s/s of distress noted  O2 on at 1L/NC    All precautions maintained, monitoring continues

## 2019-10-25 NOTE — PLAN OF CARE
Problem: Alteration in Thoughts and Perception  Goal: Treatment Goal: Gain control of psychotic behaviors/thinking, reduce/eliminate presenting symptoms and demonstrate improved reality functioning upon discharge  Outcome: Progressing  Goal: Verbalize thoughts and feelings  Description  Interventions:  - Promote a nonjudgmental and trusting relationship with the patient through active listening and therapeutic communication  - Assess patient's level of functioning, behavior and potential for risk  - Engage patient in 1 on 1 interactions for a minimum of 15 minutes each session  - Encourage patient to express fears, feelings, frustrations, and discuss symptoms    - [place] patient to reality, help patient recognize reality-based thinking   - Administer medications as ordered and assess for potential side effects  - Provide the patient education related to the signs and symptoms of the illness and desired effects of prescribed medications  Outcome: Progressing  Goal: Agree to be compliant with medication regime, as prescribed and report medication side effects  Description  Interventions:  - Offer appropriate PRN medication and supervise ingestion; conduct aims, as needed   Outcome: Progressing     Problem: Risk for Self Injury/Neglect  Goal: Verbalize thoughts and feelings  Description  Interventions:  - Assess and re-assess patient's lethality and potential for self-injury  - Engage patient in 1:1 interactions, daily, for a minimum of 15 minutes  - Encourage patient to express feelings, fears, frustrations, hopes  - Establish rapport/trust with patient   Outcome: Progressing  Goal: Refrain from harming self  Description  Interventions:  - Monitor patient closely, per order  - Develop a trusting relationship  - Supervise medication ingestion, monitor effects and side effects   Outcome: Progressing     Problem: Depression  Goal: Treatment Goal: Demonstrate behavioral control of depressive symptoms, verbalize feelings of improved mood/affect, and adopt new coping skills prior to discharge  Outcome: Progressing  Goal: Verbalize thoughts and feelings  Description  Interventions:  - Assess and re-assess patient's level of risk   - Engage patient in 1:1 interactions, daily, for a minimum of 15 minutes   - Encourage patient to express feelings, fears, frustrations, hopes   Outcome: Progressing     Problem: Alteration in Orientation  Goal: Interact with staff daily  Description  Interventions:  - Assess and re-assess patient's level of orientation  - Engage patient in 1 on 1 interactions, daily, for a minimum of 15 minutes   - Establish rapport/trust with patient   Outcome: Progressing  Goal: Cooperate with recommended testing/procedures  Description  Interventions:  - Determine need for ancillary testing  - Observe for mental status changes  - Implement falls/precaution protocol   Outcome: Progressing     Problem: PAIN - ADULT  Goal: Verbalizes/displays adequate comfort level or baseline comfort level  Description  Interventions:  - Encourage patient to monitor pain and request assistance  - Assess pain using appropriate pain scale  - Administer analgesics based on type and severity of pain and evaluate response  - Implement non-pharmacological measures as appropriate and evaluate response  - Consider cultural and social influences on pain and pain management  - Notify physician/advanced practitioner if interventions unsuccessful or patient reports new pain  Outcome: Progressing     Problem: SAFETY ADULT  Goal: Patient will remain free of falls  Description  INTERVENTIONS:  - Assess patient frequently for physical needs  -  Identify cognitive and physical deficits and behaviors that affect risk of falls    -  [place] fall precautions as indicated by assessment   - Educate patient/family on patient safety including physical limitations  - Instruct patient to call for assistance with activity based on assessment  - Modify environment to reduce risk of injury  - Consider OT/PT consult to assist with strengthening/mobility  Outcome: Progressing     Problem: RESPIRATORY - ADULT  Goal: Achieves optimal ventilation and oxygenation  Description  INTERVENTIONS:  - Assess for changes in respiratory status  - Assess for changes in mentation and behavior  - Position to facilitate oxygenation and minimize respiratory effort  - Oxygen administration by appropriate delivery method based on oxygen saturation (per order) or ABGs  - Initiate smoking cessation education as indicated  - Encourage broncho-pulmonary hygiene including cough, deep breathe, Incentive Spirometry  - Assess the need for suctioning and aspirate as needed  - Assess and instruct to report SOB or any respiratory difficulty  - Respiratory Therapy support as indicated  Outcome: Progressing     Problem: DISCHARGE PLANNING  Goal: Discharge to home or other facility with appropriate resources  Description  INTERVENTIONS:  - Conduct assessment to determine patient/family and health care team treatment goals, and need for post-acute services based on payer coverage, community resources, and patient preferences, and barriers to discharge  - Address psychosocial, clinical, and financial barriers to discharge as identified in assessment in conjunction with the patient/family and health care team  - Assist the patient in reintegration back into the community by removing barriers which may hinder a successful discharge once deemed stable  - Arrange appropriate level of post-acute services according to patient's needs and preference and payer coverage in collaboration with the physician and health care team  - Communicate with and update the patient/family, physician, and health care team regarding progress on the discharge plan  - Arrange appropriate transportation to post-acute venues    Outcome: Progressing     Problem: Alteration in Thoughts and Perception  Goal: Attend and participate in unit activities, including therapeutic, recreational, and educational groups  Description  Interventions:  - Provide therapeutic and educational activities daily, encourage attendance and participation, and document same in the medical record     CERTIFIED PEER SPECIALIST INTERVENTIONS:    Complete peer assessment with patient to assess their needs and identify their goals to complete while in the recovery program as well as once discharged into the community  Patient will complete WRAP Plan, Crisis Plan and 5 Life Domains  Patient will attend 50% of groups offered on the unit  Patient will complete a goal card weekly      Outcome: Not Progressing  Goal: Recognize dysfunctional thoughts, communicate reality-based thoughts at the time of discharge  Description  Interventions:  - Provide medication and psycho-education to assist patient in compliance and developing insight into his/her illness   Outcome: Not Progressing  Goal: Complete daily ADLs, including personal hygiene independently, as able  Description  Interventions:  - Observe, teach, and assist patient with ADLS  - Monitor and promote a balance of rest/activity, with adequate nutrition and elimination   Outcome: Not Progressing     Problem: Ineffective Coping  Goal: Identifies ineffective coping skills  Outcome: Not Progressing  Goal: Identifies healthy coping skills  Outcome: Not Progressing  Goal: Demonstrates healthy coping skills  Outcome: Not Progressing  Goal: Participates in unit activities  Description  Interventions:  - Provide therapeutic environment   - Provide required programming   - Redirect inappropriate behaviors   Outcome: Not Progressing  Goal: Patient/Family participate in treatment and DC plans  Description  Interventions:  - Provide therapeutic environment  Outcome: Not Progressing  Goal: Patient/Family verbalizes awareness of resources  Outcome: Not Progressing  Goal: Understands least restrictive measures  Description  Interventions:  - Utilize least restrictive behavior  Outcome: Not Progressing     Problem: Risk for Self Injury/Neglect  Goal: Treatment Goal: Remain safe during length of stay, learn and adopt new coping skills, and be free of self-injurious ideation, impulses and acts at the time of discharge  Outcome: Not Progressing  Goal: Attend and participate in unit activities, including therapeutic, recreational, and educational groups  Description  Interventions:  - Provide therapeutic and educational activities daily, encourage attendance and participation, and document same in the medical record  - Obtain collateral information, encourage visitation and family involvement in care   Outcome: Not Progressing  Goal: Recognize maladaptive responses and adopt new coping mechanisms  Outcome: Not Progressing  Goal: Complete daily ADLs, including personal hygiene independently, as able  Description  Interventions:  - Observe, teach, and assist patient with ADLS  - Monitor and promote a balance of rest/activity, with adequate nutrition and elimination  Outcome: Not Progressing     Problem: Depression  Goal: Refrain from isolation  Description  Interventions:  - Develop a trusting relationship   - Encourage socialization   Outcome: Not Progressing  Goal: Refrain from self-neglect  Outcome: Not Progressing     Problem: Anxiety  Goal: Anxiety is at manageable level  Description  Interventions:  - Assess and monitor patient's anxiety level  - Monitor for signs and symptoms of anxiety both physical and emotional (heart palpitations, chest pain, shortness of breath, headaches, nausea, feeling jumpy, restlessness, irritable, apprehensive)  - Collaborate with interdisciplinary team and initiate plan and interventions as ordered    - [place] patient to unit/surroundings  - Explain treatment plan  - Encourage participation in care  - Encourage verbalization of concerns/fears  - Identify coping mechanisms  - Assist in developing anxiety-reducing skills  - Administer/offer alternative therapies  - Limit or eliminate stimulants  Outcome: Not Progressing     Problem: SLEEP DISTURBANCE  Goal: Will exhibit normal sleeping pattern  Description  Interventions:  -  Assess the patients sleep pattern, noting recent changes  - Administer medication as ordered  - Decrease environmental stimuli, including noise, as appropriate during the night  - Encourage the patient to actively participate in unit groups and or exercise during the day to enhance ability to achieve adequate sleep at night  - Assess the patient, in the morning, encouraging a description of sleep experience  Outcome: Not Progressing     Problem: Nutrition/Hydration-ADULT  Goal: Nutrient/Hydration intake appropriate for improving, restoring or maintaining nutritional needs  Description  Monitor and assess patient's nutrition/hydration status for malnutrition  Collaborate with interdisciplinary team and initiate plan and interventions as ordered  Monitor patient's weight and dietary intake as ordered or per policy  Utilize nutrition screening tool and intervene as necessary  Determine patient's food preferences and provide high-protein, high-caloric foods as appropriate       INTERVENTIONS:  - Monitor oral intake, urinary output, labs, and treatment plans  - Assess nutrition and hydration status and recommend course of action  - Evaluate amount of meals eaten  - Assist patient with eating if necessary   - Allow adequate time for meals  - Recommend/ encourage appropriate diets, oral nutritional supplements, and vitamin/mineral supplements  - Order, calculate, and assess calorie counts as needed  - Recommend, monitor, and adjust tube feedings and TPN/PPN based on assessed needs  - Assess need for intravenous fluids  - Provide specific nutrition/hydration education as appropriate  - Include patient/family/caregiver in decisions related to nutrition  Outcome: Not Progressing    Patient is isolative to room and self  She denies pain, discomfort , SI, and HI  She c/o mild depression, and high anxiety  Patient was compliant with med, meal and snack  She did not attend and participate in groups  She is social with peers when out of bed for meal   No somatic complaints  Patient is taking meds with water instead of yogurt or applesauce at her insistence  No difficulties displayed    Will continue to monitor progress in recovery program

## 2019-10-25 NOTE — PLAN OF CARE
Problem: Alteration in Thoughts and Perception  Goal: Agree to be compliant with medication regime, as prescribed and report medication side effects  Description  Interventions:  - Offer appropriate PRN medication and supervise ingestion; conduct aims, as needed   Outcome: Progressing  Goal: Attend and participate in unit activities, including therapeutic, recreational, and educational groups  Description  Interventions:  - Provide therapeutic and educational activities daily, encourage attendance and participation, and document same in the medical record     CERTIFIED PEER SPECIALIST INTERVENTIONS:    Complete peer assessment with patient to assess their needs and identify their goals to complete while in the recovery program as well as once discharged into the community  Patient will complete WRAP Plan, Crisis Plan and 5 Life Domains  Patient will attend 50% of groups offered on the unit  Patient will complete a goal card weekly  Outcome: Not Progressing  Goal: Complete daily ADLs, including personal hygiene independently, as able  Description  Interventions:  - Observe, teach, and assist patient with ADLS  - Monitor and promote a balance of rest/activity, with adequate nutrition and elimination   Outcome: Jannette Zainab Farfan has been intermittently visible on the unit, somatically preoccupied but has been accepting of redirection  Compliant with her morning medications but still has not tended to her hygiene even with multiple attempts from staff to encourage her to do so; continuing to state that she is too tired to do so  Retreats to her room after meals and did not attend groups this shift  Behaviors remain controlled  Will continue to monitor for changes

## 2019-10-26 RX ADMIN — LEVOTHYROXINE SODIUM 125 MCG: 125 TABLET ORAL at 06:01

## 2019-10-26 RX ADMIN — TIOTROPIUM BROMIDE 18 MCG: 18 CAPSULE ORAL; RESPIRATORY (INHALATION) at 08:16

## 2019-10-26 RX ADMIN — FLUOXETINE 30 MG: 20 CAPSULE ORAL at 08:15

## 2019-10-26 RX ADMIN — CLOZAPINE 50 MG: 25 TABLET ORAL at 17:57

## 2019-10-26 RX ADMIN — THEOPHYLLINE ANHYDROUS 200 MG: 200 CAPSULE, EXTENDED RELEASE ORAL at 08:15

## 2019-10-26 RX ADMIN — CLOZAPINE 25 MG: 25 TABLET ORAL at 21:22

## 2019-10-26 RX ADMIN — FLUTICASONE FUROATE AND VILANTEROL TRIFENATATE 1 PUFF: 200; 25 POWDER RESPIRATORY (INHALATION) at 08:16

## 2019-10-26 RX ADMIN — PANTOPRAZOLE SODIUM 40 MG: 40 TABLET, DELAYED RELEASE ORAL at 06:01

## 2019-10-26 RX ADMIN — CLOZAPINE 50 MG: 25 TABLET ORAL at 21:22

## 2019-10-26 NOTE — PLAN OF CARE
Problem: Alteration in Thoughts and Perception  Goal: Verbalize thoughts and feelings  Description  Interventions:  - Promote a nonjudgmental and trusting relationship with the patient through active listening and therapeutic communication  - Assess patient's level of functioning, behavior and potential for risk  - Engage patient in 1 on 1 interactions for a minimum of 15 minutes each session  - Encourage patient to express fears, feelings, frustrations, and discuss symptoms    - [place] patient to reality, help patient recognize reality-based thinking   - Administer medications as ordered and assess for potential side effects  - Provide the patient education related to the signs and symptoms of the illness and desired effects of prescribed medications  Outcome: Progressing  Goal: Agree to be compliant with medication regime, as prescribed and report medication side effects  Description  Interventions:  - Offer appropriate PRN medication and supervise ingestion; conduct aims, as needed   10/26/2019 1320 by Alia Gill RN  Outcome: Progressing  10/26/2019 1319 by Alia Gill RN  Outcome: Not Progressing  Goal: Attend and participate in unit activities, including therapeutic, recreational, and educational groups  Description  Interventions:  - Provide therapeutic and educational activities daily, encourage attendance and participation, and document same in the medical record     CERTIFIED PEER SPECIALIST INTERVENTIONS:    Complete peer assessment with patient to assess their needs and identify their goals to complete while in the recovery program as well as once discharged into the community  Patient will complete WRAP Plan, Crisis Plan and 5 Life Domains  Patient will attend 50% of groups offered on the unit  Patient will complete a goal card weekly      10/26/2019 1320 by Alia Gill RN  Outcome: Not Progressing  10/26/2019 1319 by Alia Gill RN  Outcome: Not Progressing  Goal: Complete daily ADLs, including personal hygiene independently, as able  Description  Interventions:  - Observe, teach, and assist patient with ADLS  - Monitor and promote a balance of rest/activity, with adequate nutrition and elimination   Outcome: Not Sabiha Ocampo has been intermittently visible, only coming out of her room for her morning medication and meals  Still refuses to tend to her hygiene and when staff inquired about this she verbalized "that she rather not talk about this at this time and that she would shower next shift"  Isolative and blunted in affect and remains in her room in bed  Will continue to monitor for changes in behavior

## 2019-10-26 NOTE — PROGRESS NOTES
Sleeping at this time, O2 on at 1L NC, no s/s of distress and no complaints offered by patient   Monitoring continues and precautions in place

## 2019-10-26 NOTE — PROGRESS NOTES
Psychiatry Progress Note    Subjective: Interval History     The patient is lying in bed  She continues to be somatic  She stated to staff that since her increase in Prozac she has felt tightness in her chest  Patient has many somatic complaints  Patient states this morning she has gastritis and therefore is not going to journal group  She did eat breakfast   She slept well  She is medication compliant  She is denying hallucinations or suicidal ideation  Pt is pleasant during discussion       Behavior over the last 24 hours:  unchanged  Sleep: normal  Appetite: normal  Medication side effects: No  ROS: no complaints    Current medications:    Current Facility-Administered Medications:     acetaminophen (TYLENOL) tablet 650 mg, 650 mg, Oral, Q6H PRN, Mynor Terry MD    albuterol (PROVENTIL HFA,VENTOLIN HFA) inhaler 2 puff, 2 puff, Inhalation, Q4H PRN, Mynor Terry MD, 2 puff at 10/11/19 0424    aluminum-magnesium hydroxide-simethicone (MYLANTA) 200-200-20 mg/5 mL oral suspension 15 mL, 15 mL, Oral, Q4H PRN, Mynor Terry MD    ammonium lactate (LAC-HYDRIN) 12 % lotion 1 application, 1 application, Topical, BID PRN, Mynor Terry MD    benzonatate (TESSALON PERLES) capsule 100 mg, 100 mg, Oral, TID PRN, Mynor Terry MD    benztropine (COGENTIN) injection 1 mg, 1 mg, Intramuscular, Q8H PRN, Mynor Terry MD    cloZAPine (CLOZARIL) tablet 25 mg, 25 mg, Oral, HS, Mynor Terry MD, 25 mg at 10/25/19 2126    cloZAPine (CLOZARIL) tablet 50 mg, 50 mg, Oral, BID, Mynor Terry MD, 50 mg at 10/25/19 2126    EPINEPHrine PF (ADRENALIN) 1 mg/mL injection 0 15 mg, 0 15 mg, Intramuscular, Once PRN, Mynor Terry MD    FLUoxetine (PROzac) capsule 30 mg, 30 mg, Oral, Daily, Mynor Terry MD, 30 mg at 10/26/19 0815    fluticasone-vilanterol (BREO ELLIPTA) 200-25 MCG/INH inhaler 1 puff, 1 puff, Inhalation, Daily, Mynor Terry MD, 1 puff at 10/26/19 0816    ketotifen (ZADITOR) 0 025 % ophthalmic solution 1 drop, 1 drop, Right Eye, BID PRN, Ervin Little MD    levothyroxine tablet 125 mcg, 125 mcg, Oral, Early Morning, Ervin Little MD, 125 mcg at 10/26/19 0601    magnesium hydroxide (MILK OF MAGNESIA) 400 mg/5 mL oral suspension 30 mL, 30 mL, Oral, Daily PRN, Ervin Little MD    montelukast (SINGULAIR) tablet 10 mg, 10 mg, Oral, HS, Ervin Little MD, 10 mg at 10/24/19 2110    OLANZapine (ZyPREXA) IM injection 5 mg, 5 mg, Intramuscular, Q8H PRN, Ervin Little MD    OLANZapine (ZyPREXA) tablet 5 mg, 5 mg, Oral, Q8H PRN, Ervin Little MD    ondansetron (ZOFRAN-ODT) dispersible tablet 4 mg, 4 mg, Oral, Q6H PRN, Ervin Little MD    pantoprazole (PROTONIX) EC tablet 40 mg, 40 mg, Oral, Early Morning, Ervin Little MD, 40 mg at 10/26/19 0601    polyethylene glycol (MIRALAX) packet 17 g, 17 g, Oral, Daily PRN, Ervin Little MD    polyvinyl alcohol (LIQUIFILM TEARS) 1 4 % ophthalmic solution 1 drop, 1 drop, Both Eyes, Q3H PRN, Ervin Little MD    theophylline (JEF-24) 24 hr capsule 200 mg, 200 mg, Oral, Daily, Ervin Little MD, 200 mg at 10/26/19 0815    tiotropium (SPIRIVA) capsule for inhaler 18 mcg, 18 mcg, Inhalation, Daily, Ervin Little MD, 18 mcg at 10/26/19 0816    traZODone (DESYREL) tablet 25 mg, 25 mg, Oral, HS PRN, Ervin Little MD    Current Problem List:    Patient Active Problem List   Diagnosis    Sepsis [facility]    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[facility]    Tobacco use disorder, continuous    Bipolar disorder ([facility]    Left hip pain    Lactic acidosis    Hypokalemia    Hypomagnesemia    Compression fracture of L4 lumbar vertebra    Thoracic compression fracture (HCC)    Ventral hernia    Parapneumonic effusion    Acute on chronic respiratory failure with hypoxia (HCC)    Chronic respiratory failure (HCC)    Hypophosphatemia    Elevated MCV    Schizoaffective disorder, bipolar type ([facility]    Acquired hypothyroidism    Gastroesophageal reflux disease without esophagitis    Abnormal CT of the chest    Excessive cerumen in left ear canal    Lipoma of right upper extremity    Polydipsia    Localized swelling of both lower legs    Noncompliant with deep vein thrombosis (DVT) prophylaxis    Allergic conjunctivitis of right eye    At risk for aspiration       Problem list reviewed 10/26/19     Objective:     Vital Signs:  Vitals:    10/25/19 1900 10/25/19 2130 10/26/19 0730 10/26/19 0732   BP: 102/65  112/68 93/68   BP Location: Left arm  Left arm    Pulse: 96  86 103   Resp: 16  18    Temp: 98 1 °F (36 7 °C)  97 7 °F (36 5 °C)    TempSrc: Temporal  Temporal    SpO2: 93% 94%     Weight:       Height:             Appearance:  age appropriate and casually dressed   Behavior:  guarded   Speech:  normal volume   Mood:  anxious   Affect:  mood-congruent   Thought Process:  normal   Thought Content:  delusions  somatic   Perceptual Disturbances: None   Risk Potential: none   Sensorium:  person, place, situation and time   Cognition:  intact   Consciousness:  alert and awake    Attention: attention span and concentration were age appropriate   Intellect: average   Insight:  limited   Judgment: limited      Motor Activity: no abnormal movements       I/O Past 24 hours:  No intake/output data recorded  No intake/output data recorded  Labs:  Reviewed 10/26/19      Assessment / Plan:     Schizoaffective disorder, bipolar type ([facility]    Recommended Treatment:      Medication changes:  1) continue current medication regimen  Non-pharmacological treatments  1) Continue with group therapy, milieu therapy and occupational therapy  Safety  1) Safety/communication plan established targeting dynamic risk factors above  2) Risks, benefits, and possible side effects of medications explained to patient and patient verbalizes understanding  Counseling / Coordination of Care    Total floor / unit time spent today 20 minutes  Greater than 50% of total time was spent with the patient and / or family counseling and / or coordination of care   A description of the counseling / coordination of care  Patient's Rights, confidentiality and exceptions to confidentiality, use of automated medical record, [facility] staff access to medical record, and consent to treatment reviewed      Rodriguez Najera PA-C

## 2019-10-26 NOTE — PLAN OF CARE
Problem: Alteration in Thoughts and Perception  Goal: Verbalize thoughts and feelings  Description  Interventions:  - Promote a nonjudgmental and trusting relationship with the patient through active listening and therapeutic communication  - Assess patient's level of functioning, behavior and potential for risk  - Engage patient in 1 on 1 interactions for a minimum of 15 minutes each session  - Encourage patient to express fears, feelings, frustrations, and discuss symptoms    - [place] patient to reality, help patient recognize reality-based thinking   - Administer medications as ordered and assess for potential side effects  - Provide the patient education related to the signs and symptoms of the illness and desired effects of prescribed medications  Outcome: Progressing  Goal: Agree to be compliant with medication regime, as prescribed and report medication side effects  Description  Interventions:  - Offer appropriate PRN medication and supervise ingestion; conduct aims, as needed   Outcome: Progressing     Problem: Depression  Goal: Refrain from isolation  Description  Interventions:  - Develop a trusting relationship   - Encourage socialization   Outcome: Progressing     Problem: Alteration in Thoughts and Perception  Goal: Attend and participate in unit activities, including therapeutic, recreational, and educational groups  Description  Interventions:  - Provide therapeutic and educational activities daily, encourage attendance and participation, and document same in the medical record     CERTIFIED PEER SPECIALIST INTERVENTIONS:    Complete peer assessment with patient to assess their needs and identify their goals to complete while in the recovery program as well as once discharged into the community  Patient will complete WRAP Plan, Crisis Plan and 5 Life Domains  Patient will attend 50% of groups offered on the unit  Patient will complete a goal card weekly      Outcome: Not Progressing  Goal: Complete daily ADLs, including personal hygiene independently, as able  Description  Interventions:  - Observe, teach, and assist patient with ADLS  - Monitor and promote a balance of rest/activity, with adequate nutrition and elimination   Outcome: Not Progressing     [address] has been more visible this shift; sitting out in chair by phone when it is not in use  Has stayed OOB 39' after meal; ate 100%  Had HS snack  Took the scheduled medicines except the Singulair as it didn't look right to her, not the "correct pill"  Did not accept that different manufacturers make different colored pills, &, it wouldn't have scanned if was incorrect  Very somatic; persistently with c/o being dizzy upon arising despite normal BP's for her  Was encouraged to drink fluids & did have water  Did not attend evening programming  Did not address hygiene  Used the [employer] achieving 1000ml consistently  Wearing the QHS humidified nasal O2 @ 1L now for bed  Is pleasant

## 2019-10-27 LAB — GLUCOSE SERPL-MCNC: 108 MG/DL (ref 65–140)

## 2019-10-27 PROCEDURE — 82948 REAGENT STRIP/BLOOD GLUCOSE: CPT

## 2019-10-27 RX ADMIN — THEOPHYLLINE ANHYDROUS 200 MG: 200 CAPSULE, EXTENDED RELEASE ORAL at 08:20

## 2019-10-27 RX ADMIN — MONTELUKAST SODIUM 10 MG: 10 TABLET, COATED ORAL at 21:23

## 2019-10-27 RX ADMIN — CLOZAPINE 25 MG: 25 TABLET ORAL at 21:24

## 2019-10-27 RX ADMIN — FLUOXETINE 30 MG: 20 CAPSULE ORAL at 08:20

## 2019-10-27 RX ADMIN — CLOZAPINE 50 MG: 25 TABLET ORAL at 17:55

## 2019-10-27 RX ADMIN — TIOTROPIUM BROMIDE 18 MCG: 18 CAPSULE ORAL; RESPIRATORY (INHALATION) at 08:21

## 2019-10-27 RX ADMIN — CLOZAPINE 50 MG: 25 TABLET ORAL at 21:23

## 2019-10-27 RX ADMIN — PANTOPRAZOLE SODIUM 40 MG: 40 TABLET, DELAYED RELEASE ORAL at 06:13

## 2019-10-27 RX ADMIN — LEVOTHYROXINE SODIUM 125 MCG: 125 TABLET ORAL at 06:13

## 2019-10-27 RX ADMIN — FLUTICASONE FUROATE AND VILANTEROL TRIFENATATE 1 PUFF: 200; 25 POWDER RESPIRATORY (INHALATION) at 08:21

## 2019-10-27 NOTE — PLAN OF CARE
Problem: Alteration in Thoughts and Perception  Goal: Agree to be compliant with medication regime, as prescribed and report medication side effects  Description  Interventions:  - Offer appropriate PRN medication and supervise ingestion; conduct aims, as needed   Outcome: Progressing  Goal: Complete daily ADLs, including personal hygiene independently, as able  Description  Interventions:  - Observe, teach, and assist patient with ADLS  - Monitor and promote a balance of rest/activity, with adequate nutrition and elimination   Outcome: Progressing     Problem: Alteration in Thoughts and Perception  Goal: Attend and participate in unit activities, including therapeutic, recreational, and educational groups  Description  Interventions:  - Provide therapeutic and educational activities daily, encourage attendance and participation, and document same in the medical record     CERTIFIED PEER SPECIALIST INTERVENTIONS:    Complete peer assessment with patient to assess their needs and identify their goals to complete while in the recovery program as well as once discharged into the community  Patient will complete WRAP Plan, Crisis Plan and 5 Life Domains  Patient will attend 50% of groups offered on the unit  Patient will complete a goal card weekly  Outcome: Not Progressing     [address], w/encouragement, was willing to shower, shampoo & groom w/some assistance to shampoo, comb out, blow dry, braid hair  Otherwise did the other tasks herself & tolerated well  Ate 50% of meal plus an Ensure  Is pleasant, but, isolative to room in free time  Did not attend PM Group  Did have an HS snack  Came up for her scheduled medicine, but, again refused the Singulair, presented tonight to her in package, as is just not sure it is the correct pill  Did her Incentive Spirometry, achieving up to 1250ml volume  Wearing her QHS humidified nasal O2 @ 1L for bed

## 2019-10-27 NOTE — PROGRESS NOTES
Psychiatry Progress Note    Subjective: Interval History     The patient is lying in bed this morning  She did not attend goal group  She continues to be somatic and fixated on her medications  Today she is fixated on her Breo inhaler  Pt is medication compliant  She is eating part of her meals with Ensure  With encouragement she did shower yesterday       Behavior over the last 24 hours:  unchanged  Sleep: normal  Appetite: normal  Medication side effects: No  ROS: no complaints    Current medications:    Current Facility-Administered Medications:     acetaminophen (TYLENOL) tablet 650 mg, 650 mg, Oral, Q6H PRN, Faheem Daniels MD    albuterol (PROVENTIL HFA,VENTOLIN HFA) inhaler 2 puff, 2 puff, Inhalation, Q4H PRN, Faheem Daniels MD, 2 puff at 10/11/19 0424    aluminum-magnesium hydroxide-simethicone (MYLANTA) 200-200-20 mg/5 mL oral suspension 15 mL, 15 mL, Oral, Q4H PRN, Faheem Daniels MD    ammonium lactate (LAC-HYDRIN) 12 % lotion 1 application, 1 application, Topical, BID PRN, Faheem Daniels MD    benzonatate (TESSALON PERLES) capsule 100 mg, 100 mg, Oral, TID PRN, Faheem Daniels MD    benztropine (COGENTIN) injection 1 mg, 1 mg, Intramuscular, Q8H PRN, Faheem Daniels MD    cloZAPine (CLOZARIL) tablet 25 mg, 25 mg, Oral, HS, Faheem Daniels MD, 25 mg at 10/26/19 2122    cloZAPine (CLOZARIL) tablet 50 mg, 50 mg, Oral, BID, Faheem Daniels MD, 50 mg at 10/26/19 2122    EPINEPHrine PF (ADRENALIN) 1 mg/mL injection 0 15 mg, 0 15 mg, Intramuscular, Once PRN, Faheem Daniels MD    FLUoxetine (PROzac) capsule 30 mg, 30 mg, Oral, Daily, Faheem Daniels MD, 30 mg at 10/27/19 0820    fluticasone-vilanterol (BREO ELLIPTA) 200-25 MCG/INH inhaler 1 puff, 1 puff, Inhalation, Daily, Faheem Daniels MD, 1 puff at 10/27/19 0821    ketotifen (ZADITOR) 0 025 % ophthalmic solution 1 drop, 1 drop, Right Eye, BID PRN, Faheem Daniels MD    levothyroxine tablet 125 mcg, 125 mcg, Oral, Early Morning, Faheem Daniels MD, 125 mcg at 10/27/19 9825   magnesium hydroxide (MILK OF MAGNESIA) 400 mg/5 mL oral suspension 30 mL, 30 mL, Oral, Daily PRN, Isidoro Gonzalez MD    montelukast (SINGULAIR) tablet 10 mg, 10 mg, Oral, HS, Isidoro Gonzalez MD, 10 mg at 10/24/19 2110    OLANZapine (ZyPREXA) IM injection 5 mg, 5 mg, Intramuscular, Q8H PRN, Isidoro Gonzalez MD    OLANZapine (ZyPREXA) tablet 5 mg, 5 mg, Oral, Q8H PRN, Isidoro Gonzalez MD    ondansetron (ZOFRAN-ODT) dispersible tablet 4 mg, 4 mg, Oral, Q6H PRN, Isidoro Gonzalez MD    pantoprazole (PROTONIX) EC tablet 40 mg, 40 mg, Oral, Early Morning, Isidoro Gonzalez MD, 40 mg at 10/27/19 1086    polyethylene glycol (MIRALAX) packet 17 g, 17 g, Oral, Daily PRN, Isidoro Gonzalez MD    polyvinyl alcohol (LIQUIFILM TEARS) 1 4 % ophthalmic solution 1 drop, 1 drop, Both Eyes, Q3H PRN, Isidoro Gonzalez MD    theophylline (JEF-24) 24 hr capsule 200 mg, 200 mg, Oral, Daily, Isidoro Gonzalez MD, 200 mg at 10/27/19 0820    tiotropium [facility]) capsule for inhaler 18 mcg, 18 mcg, Inhalation, Daily, Isidoro Gonzalez MD, 18 mcg at 10/27/19 9792    traZODone (DESYREL) tablet 25 mg, 25 mg, Oral, HS PRN, Isidoro Gonzalez MD    Current Problem List:    Patient Active Problem List   Diagnosis    Sepsis ([address] )    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[address] )    Tobacco use disorder, continuous    Bipolar disorder ([address] )    Left hip pain    Lactic acidosis    Hypokalemia    Hypomagnesemia    Compression fracture of L4 lumbar vertebra    Thoracic compression fracture (HCC)    Ventral hernia    Parapneumonic effusion    Acute on chronic respiratory failure with hypoxia (HCC)    Chronic respiratory failure (HCC)    Hypophosphatemia    Elevated MCV    Schizoaffective disorder, bipolar type ([address] )    Acquired hypothyroidism    Gastroesophageal reflux disease without esophagitis    Abnormal CT of the chest    Excessive cerumen in left ear canal    Lipoma of right upper extremity    Polydipsia    Localized swelling of both lower legs    Noncompliant with deep vein thrombosis (DVT) prophylaxis    Allergic conjunctivitis of right eye    At risk for aspiration       Problem list reviewed 10/27/19     Objective:     Vital Signs:  Vitals:    10/26/19 1610 10/26/19 2005 10/26/19 2130 10/27/19 0730   BP: 101/57 109/70  116/65   BP Location: Left arm Left arm  Left arm   Pulse: 94 89  104   Resp: 18 18  17   Temp: 99 4 °F (37 4 °C) 98 1 °F (36 7 °C)  98 1 °F (36 7 °C)   TempSrc: Temporal Temporal  Temporal   SpO2: 97% 97% 94%    Weight:    67 4 kg (148 lb 9 6 oz)   Height:             Appearance:  age appropriate and casually dressed   Behavior:  guarded   Speech:  normal volume   Mood:  anxious   Affect:  mood-congruent   Thought Process:  normal   Thought Content:  delusions  somatic   Perceptual Disturbances: None   Risk Potential: none   Sensorium:  person, place, situation and time   Cognition:  intact   Consciousness:  alert and awake    Attention: attention span and concentration were age appropriate   Intellect: average   Insight:  limited   Judgment: limited      Motor Activity: no abnormal movements       I/O Past 24 hours:  No intake/output data recorded  No intake/output data recorded  Labs:  Reviewed 10/27/19      Assessment / Plan:     Schizoaffective disorder, bipolar type ([facility] )    Recommended Treatment:      Medication changes:  1) continue current medication regimen  Non-pharmacological treatments  1) Continue with group therapy, milieu therapy and occupational therapy  Safety  1) Safety/communication plan established targeting dynamic risk factors above  2) Risks, benefits, and possible side effects of medications explained to patient and patient verbalizes understanding  Counseling / Coordination of Care    Total floor / unit time spent today 20 minutes  Greater than 50% of total time was spent with the patient and / or family counseling and / or coordination of care  A description of the counseling / coordination of care       Patient's Rights, confidentiality and exceptions to confidentiality, use of automated medical record, Jeb Patrick staff access to medical record, and consent to treatment reviewed      Donald Kamara PA-C

## 2019-10-27 NOTE — PLAN OF CARE
Problem: SLEEP DISTURBANCE  Goal: Will exhibit normal sleeping pattern  Description  Interventions:  -  Assess the patients sleep pattern, noting recent changes  - Administer medication as ordered  - Decrease environmental stimuli, including noise, as appropriate during the night  - Encourage the patient to actively participate in unit groups and or exercise during the day to enhance ability to achieve adequate sleep at night  - Assess the patient, in the morning, encouraging a description of sleep experience  Outcome: Tamie Justin has been asleep throughout the night  Respirations easy and non labored  Oxygen 1 liter via nasal cannula continues while asleep  Took medication  this AM after needing the light turned on so she could make sure they were the correct pills  No issues or behaviors  Continue to monitor  Precautions maintained

## 2019-10-27 NOTE — PLAN OF CARE
Problem: Alteration in Thoughts and Perception  Goal: Agree to be compliant with medication regime, as prescribed and report medication side effects  Description  Interventions:  - Offer appropriate PRN medication and supervise ingestion; conduct aims, as needed   Outcome: Progressing  Goal: Attend and participate in unit activities, including therapeutic, recreational, and educational groups  Description  Interventions:  - Provide therapeutic and educational activities daily, encourage attendance and participation, and document same in the medical record     CERTIFIED PEER SPECIALIST INTERVENTIONS:    Complete peer assessment with patient to assess their needs and identify their goals to complete while in the recovery program as well as once discharged into the community  Patient will complete WRAP Plan, Crisis Plan and 5 Life Domains  Patient will attend 50% of groups offered on the unit  Patient will complete a goal card weekly  Outcome: Not Progressing     Problem: Alteration in Orientation  Goal: Interact with staff daily  Description  Interventions:  - Assess and re-assess patient's level of orientation  - Engage patient in 1 on 1 interactions, daily, for a minimum of 15 minutes   - Establish rapport/trust with patient   Outcome: Donavon Scott has been intermittently visible, only coming out of her room for her morning medication and meals  Isolative and blunted in affect and remains in her room in bed  Somatically preoccupied with her medications  Will continue to monitor for changes in behavior

## 2019-10-28 PROCEDURE — 99232 SBSQ HOSP IP/OBS MODERATE 35: CPT | Performed by: PSYCHIATRY & NEUROLOGY

## 2019-10-28 RX ORDER — CLOZAPINE 25 MG/1
25 TABLET ORAL 2 TIMES DAILY
Status: DISCONTINUED | OUTPATIENT
Start: 2019-10-28 | End: 2020-01-01 | Stop reason: HOSPADM

## 2019-10-28 RX ADMIN — CLOZAPINE 50 MG: 25 TABLET ORAL at 21:25

## 2019-10-28 RX ADMIN — PANTOPRAZOLE SODIUM 40 MG: 40 TABLET, DELAYED RELEASE ORAL at 05:50

## 2019-10-28 RX ADMIN — CLOZAPINE 50 MG: 25 TABLET ORAL at 16:54

## 2019-10-28 RX ADMIN — THEOPHYLLINE ANHYDROUS 200 MG: 200 CAPSULE, EXTENDED RELEASE ORAL at 08:11

## 2019-10-28 RX ADMIN — FLUOXETINE 30 MG: 20 CAPSULE ORAL at 08:11

## 2019-10-28 RX ADMIN — TIOTROPIUM BROMIDE 18 MCG: 18 CAPSULE ORAL; RESPIRATORY (INHALATION) at 08:11

## 2019-10-28 RX ADMIN — CLOZAPINE 25 MG: 25 TABLET ORAL at 16:54

## 2019-10-28 RX ADMIN — FLUTICASONE FUROATE AND VILANTEROL TRIFENATATE 1 PUFF: 200; 25 POWDER RESPIRATORY (INHALATION) at 08:11

## 2019-10-28 RX ADMIN — LEVOTHYROXINE SODIUM 125 MCG: 125 TABLET ORAL at 05:50

## 2019-10-28 RX ADMIN — CLOZAPINE 25 MG: 25 TABLET ORAL at 21:24

## 2019-10-28 RX ADMIN — MONTELUKAST SODIUM 10 MG: 10 TABLET, COATED ORAL at 21:24

## 2019-10-28 NOTE — PLAN OF CARE
Problem: Alteration in Thoughts and Perception  Goal: Verbalize thoughts and feelings  Description  Interventions:  - Promote a nonjudgmental and trusting relationship with the patient through active listening and therapeutic communication  - Assess patient's level of functioning, behavior and potential for risk  - Engage patient in 1 on 1 interactions for a minimum of 15 minutes each session  - Encourage patient to express fears, feelings, frustrations, and discuss symptoms    - [place] patient to reality, help patient recognize reality-based thinking   - Administer medications as ordered and assess for potential side effects  - Provide the patient education related to the signs and symptoms of the illness and desired effects of prescribed medications  Outcome: Progressing  Goal: Agree to be compliant with medication regime, as prescribed and report medication side effects  Description  Interventions:  - Offer appropriate PRN medication and supervise ingestion; conduct aims, as needed   Outcome: Progressing  Goal: Attend and participate in unit activities, including therapeutic, recreational, and educational groups  Description  Interventions:  - Provide therapeutic and educational activities daily, encourage attendance and participation, and document same in the medical record     CERTIFIED PEER SPECIALIST INTERVENTIONS:    Complete peer assessment with patient to assess their needs and identify their goals to complete while in the recovery program as well as once discharged into the community  Patient will complete WRAP Plan, Crisis Plan and 5 Life Domains  Patient will attend 50% of groups offered on the unit  Patient will complete a goal card weekly      Outcome: Progressing     Problem: Ineffective Coping  Goal: Participates in unit activities  Description  Interventions:  - Provide therapeutic environment   - Provide required programming   - Redirect inappropriate behaviors   Outcome: Progressing  Goal: Patient/Family participate in treatment and DC plans  Description  Interventions:  - Provide therapeutic environment  Outcome: Progressing  Goal: Patient/Family verbalizes awareness of resources  Outcome: Progressing  Goal: Understands least restrictive measures  Description  Interventions:  - Utilize least restrictive behavior  Outcome: Progressing     Problem: Risk for Self Injury/Neglect  Goal: Verbalize thoughts and feelings  Description  Interventions:  - Assess and re-assess patient's lethality and potential for self-injury  - Engage patient in 1:1 interactions, daily, for a minimum of 15 minutes  - Encourage patient to express feelings, fears, frustrations, hopes  - Establish rapport/trust with patient   Outcome: Progressing  Goal: Refrain from harming self  Description  Interventions:  - Monitor patient closely, per order  - Develop a trusting relationship  - Supervise medication ingestion, monitor effects and side effects   Outcome: Progressing  Goal: Attend and participate in unit activities, including therapeutic, recreational, and educational groups  Description  Interventions:  - Provide therapeutic and educational activities daily, encourage attendance and participation, and document same in the medical record  - Obtain collateral information, encourage visitation and family involvement in care   Outcome: Progressing     Problem: Depression  Goal: Verbalize thoughts and feelings  Description  Interventions:  - Assess and re-assess patient's level of risk   - Engage patient in 1:1 interactions, daily, for a minimum of 15 minutes   - Encourage patient to express feelings, fears, frustrations, hopes   Outcome: Progressing     Problem: Anxiety  Goal: Anxiety is at manageable level  Description  Interventions:  - Assess and monitor patient's anxiety level     - Monitor for signs and symptoms of anxiety both physical and emotional (heart palpitations, chest pain, shortness of breath, headaches, nausea, feeling jumpy, restlessness, irritable, apprehensive)  - Collaborate with interdisciplinary team and initiate plan and interventions as ordered  - [place] patient to unit/surroundings  - Explain treatment plan  - Encourage participation in care  - Encourage verbalization of concerns/fears  - Identify coping mechanisms  - Assist in developing anxiety-reducing skills  - Administer/offer alternative therapies  - Limit or eliminate stimulants  Outcome: Progressing     Problem: Alteration in Orientation  Goal: Interact with staff daily  Description  Interventions:  - Assess and re-assess patient's level of orientation  - Engage patient in 1 on 1 interactions, daily, for a minimum of 15 minutes   - Establish rapport/trust with patient   Outcome: Progressing     Problem: PAIN - ADULT  Goal: Verbalizes/displays adequate comfort level or baseline comfort level  Description  Interventions:  - Encourage patient to monitor pain and request assistance  - Assess pain using appropriate pain scale  - Administer analgesics based on type and severity of pain and evaluate response  - Implement non-pharmacological measures as appropriate and evaluate response  - Consider cultural and social influences on pain and pain management  - Notify physician/advanced practitioner if interventions unsuccessful or patient reports new pain  Outcome: Progressing     Problem: SAFETY ADULT  Goal: Patient will remain free of falls  Description  INTERVENTIONS:  - Assess patient frequently for physical needs  -  Identify cognitive and physical deficits and behaviors that affect risk of falls    -  [place] fall precautions as indicated by assessment   - Educate patient/family on patient safety including physical limitations  - Instruct patient to call for assistance with activity based on assessment  - Modify environment to reduce risk of injury  - Consider OT/PT consult to assist with strengthening/mobility  Outcome: Progressing Problem: RESPIRATORY - ADULT  Goal: Achieves optimal ventilation and oxygenation  Description  INTERVENTIONS:  - Assess for changes in respiratory status  - Assess for changes in mentation and behavior  - Position to facilitate oxygenation and minimize respiratory effort  - Oxygen administration by appropriate delivery method based on oxygen saturation (per order) or ABGs  - Initiate smoking cessation education as indicated  - Encourage broncho-pulmonary hygiene including cough, deep breathe, Incentive Spirometry  - Assess the need for suctioning and aspirate as needed  - Assess and instruct to report SOB or any respiratory difficulty  - Respiratory Therapy support as indicated  Outcome: Progressing     Problem: Nutrition/Hydration-ADULT  Goal: Nutrient/Hydration intake appropriate for improving, restoring or maintaining nutritional needs  Description  Monitor and assess patient's nutrition/hydration status for malnutrition  Collaborate with interdisciplinary team and initiate plan and interventions as ordered  Monitor patient's weight and dietary intake as ordered or per policy  Utilize nutrition screening tool and intervene as necessary  Determine patient's food preferences and provide high-protein, high-caloric foods as appropriate       INTERVENTIONS:  - Monitor oral intake, urinary output, labs, and treatment plans  - Assess nutrition and hydration status and recommend course of action  - Evaluate amount of meals eaten  - Assist patient with eating if necessary   - Allow adequate time for meals  - Recommend/ encourage appropriate diets, oral nutritional supplements, and vitamin/mineral supplements  - Order, calculate, and assess calorie counts as needed  - Recommend, monitor, and adjust tube feedings and TPN/PPN based on assessed needs  - Assess need for intravenous fluids  - Provide specific nutrition/hydration education as appropriate  - Include patient/family/caregiver in decisions related to nutrition  Outcome: Progressing     Problem: Risk for Self Injury/Neglect  Goal: Recognize maladaptive responses and adopt new coping mechanisms  Outcome: Not Progressing     Problem: Depression  Goal: Refrain from isolation  Description  Interventions:  - Develop a trusting relationship   - Encourage socialization   Outcome: Not Progressing  Goal: Refrain from self-neglect  Outcome: Not Progressing   The patient remained mostly isolative to her room but did come out for meds and meals, and attended her treatment team meeting and [address] [place] [address]  Minimal somatic complaints voiced with brief exception of wanting to know if she last any weight since last week  Easily reassured and redirectable  No other complaints or issues noted  Continue to monitor

## 2019-10-28 NOTE — PLAN OF CARE

## 2019-10-28 NOTE — PROGRESS NOTES
Progress Note - Jose Ramon Roblero 1955, 60 y o  female MRN: [id number]    [facility] [address] Encounter: [id number]    Primary Care Provider: Richar Bass PA-C   Date and time admitted to hospital: 7/23/2019  5:30 PM        * Schizoaffective disorder, bipolar type [facility])  Assessment & Plan  Psychiatry Progress Note    Patient did take a shower on 10/26 and not since then  She is again psychosomatic with the fear that she is going to die here with  Low blood sugar, breathing problem, swallowing difficulty, and believes that she has cancer and has refused to take showers for the last 6 days in a row because of multiple excuses  She also claims that she has been feeling sad still  She is now on mechanical soft diet as per recommendation of GI and they have recommended outpatient surgery for hiatal hernia repair if she chooses to upon discharge  She is still  suspicious about certain staff who gives her medications like her inhalers and the spirometer off and on and now asking to see respiratory for her portable oxygen which she wants to use when she gets out of the unit even though she has not used it here at all  She thinks that we are withholding some information from her as she believes she has some terrible illness  She attended only 15% of groups last week  She was again reminded that  if she maintains her 25% group attendance and attends to her ADLs skills,  we can always look into discharging her [facility] [place] [facility]  She continues to present with stilted speech being too formal as if talking in a court of law which has not changed  She does not admit to any overt hallucinations or paranoia but still appears to harbor some covert  paranoia based on her demeanor and interaction on the unit and does admit that she does have "psychosomatic sxs"  She again verbalizes her suspicion that that there is a micro chip on her right forearm and that it is not a fatty tumor and has accused her therapist here of trying to steal a medical doctor( Dr Melchor Delong, whom no one knows from here) whom she believes that she is in love with  No current suicidal homicidal thoughts intent or plans reported  No overt hallucinations or other delusions reported   No signs or sxs of agranulocytosis or myocarditis or endocarditis and she is moving her bowels so far with no issues  Patient was again reminded to attend to ADLs skills and take showers at least every other day and attend more groups to keep up with her 25% expectation and she needs lot of prompts and reminders         Current medications:    Current Facility-Administered Medications:     acetaminophen (TYLENOL) tablet 650 mg, 650 mg, Oral, Q6H PRN, Felisa Florence MD    albuterol (PROVENTIL HFA,VENTOLIN HFA) inhaler 2 puff, 2 puff, Inhalation, Q4H PRN, Felisa Florence MD, 2 puff at 10/11/19 0424    aluminum-magnesium hydroxide-simethicone (MYLANTA) 200-200-20 mg/5 mL oral suspension 15 mL, 15 mL, Oral, Q4H PRN, Felisa Florence MD    ammonium lactate (LAC-HYDRIN) 12 % lotion 1 application, 1 application, Topical, BID PRN, Felisa Florence MD    benzonatate (TESSALON PERLES) capsule 100 mg, 100 mg, Oral, TID PRN, Felisa Florence MD    benztropine (COGENTIN) injection 1 mg, 1 mg, Intramuscular, Q8H PRN, Felisa Florence MD    cloZAPine (CLOZARIL) tablet 25 mg, 25 mg, Oral, HS, Felisa Florence MD, 25 mg at 10/27/19 2124    cloZAPine (CLOZARIL) tablet 50 mg, 50 mg, Oral, BID, Felisa Florence MD, 50 mg at 10/27/19 2123    EPINEPHrine PF (ADRENALIN) 1 mg/mL injection 0 15 mg, 0 15 mg, Intramuscular, Once PRN, Felias Florence MD    FLUoxetine (PROzac) capsule 30 mg, 30 mg, Oral, Daily, Feilsa Florence MD, 30 mg at 10/28/19 0811    fluticasone-vilanterol (BREO ELLIPTA) 200-25 MCG/INH inhaler 1 puff, 1 puff, Inhalation, Daily, Felisa Florence MD, 1 puff at 10/28/19 0811    ketotifen (ZADITOR) 0 025 % ophthalmic solution 1 drop, 1 drop, Right Eye, BID PRN, Fleisa Florence, MD    levothyroxine tablet 125 mcg, 125 mcg, Oral, Early Morning, Shilpa Trujillo MD, 125 mcg at 10/28/19 0550    magnesium hydroxide (MILK OF MAGNESIA) 400 mg/5 mL oral suspension 30 mL, 30 mL, Oral, Daily PRN, Shilpa Trujillo MD    montelukast (SINGULAIR) tablet 10 mg, 10 mg, Oral, HS, Shilpa Trujillo MD, 10 mg at 10/27/19 2123    OLANZapine (ZyPREXA) IM injection 5 mg, 5 mg, Intramuscular, Q8H PRN, Shilpa Trujillo MD    OLANZapine (ZyPREXA) tablet 5 mg, 5 mg, Oral, Q8H PRN, Shilpa Trujillo MD    ondansetron (ZOFRAN-ODT) dispersible tablet 4 mg, 4 mg, Oral, Q6H PRN, Shilpa Trujillo MD    pantoprazole (PROTONIX) EC tablet 40 mg, 40 mg, Oral, Early Morning, Shilpa Trujillo MD, 40 mg at 10/28/19 0550    polyethylene glycol (MIRALAX) packet 17 g, 17 g, Oral, Daily PRN, Shilpa Trujillo MD    polyvinyl alcohol (LIQUIFILM TEARS) 1 4 % ophthalmic solution 1 drop, 1 drop, Both Eyes, Q3H PRN, Shilpa Trujillo MD    theophylline (JEF-24) 24 hr capsule 200 mg, 200 mg, Oral, Daily, Shilpa Trujillo MD, 200 mg at 10/28/19 0811    tiotropium (SPIRIVA) capsule for inhaler 18 mcg, 18 mcg, Inhalation, Daily, Shilpa Trujillo MD, 18 mcg at 10/28/19 4039    traZODone (DESYREL) tablet 25 mg, 25 mg, Oral, HS PRN, Shilpa Trujillo MD  Justification if on more than two antipsychotics:  Only on his clozapine  Side effects if any:  None    Risks , benefits, side effects and precautions of medications discussed with patient and reminded patient to let us know any problems with medications     Objective:     Vital Signs:  Vitals:    10/27/19 1602 10/27/19 1954 10/27/19 2130 10/28/19 0700   BP: 91/53 (!) 81/53  101/63   BP Location: Left arm Left arm  Right arm   Pulse: 99 97  97   Resp: 18 18  18   Temp: 98 7 °F (37 1 °C) 99 °F (37 2 °C)  97 9 °F (36 6 °C)   TempSrc: Temporal Temporal     SpO2: 97% 97% 94%    Weight:       Height:         Appearance:  age appropriate, casually dressed and overweight older than stated age   Behavior:  deviant, evasive and guarded and suspicious but with good eye contact   Speech:  normal pitch and normal volume but tends to become loud at times   Mood:  anxious and dysthymic   Affect:  mood-congruent   Thought Process:  goal directed and illogical slightly pressured but able to be redirected and talks as if in a court of low being stilted   Thought Content:  delusions  grandiose and somatic about not being able to breathe despite being on nasal oxygen and paranoid about people out to harm her and Atrovent inhaler tainteded with peanut oil  No current suicidal homicidal thoughts intent or plans reported  No phobias obsessions or compulsions reported   Perceptual Disturbances: None and does not appear responding to internal stimuli   Risk Potential: Tendency to not care for herself if she goes off treatment   Sensorium:  person, place, time/date, situation, day of week, month of year and time   Cognition:  grossly intact   Consciousness:  alert and awake    Attention: Intact concentration and attention span   Intellect: Considered to be at least of average intelligence   Insight:  limited in denial   Judgment: poor      Motor Activity: no abnormal movements         Recent Labs:  Results Reviewed     None          I/O Past 24 hours:  No intake/output data recorded  No intake/output data recorded          Assessment / Plan:     Schizoaffective disorder[facility]      Reason for continued inpatient care:  Because of underlying paranoia and refusal to go back to the personal care home and inability to care for herself on her own  Acceptance by patient:  Accepting  Qiunn Velásquez in recovery:  About living in another personal care home once she feels better  Understanding of medications :  Has some understanding  Involved in reintegration process:  Adjusting to the unit  Trusting in relatoinship with psychiatrist:  Trusting    Recommended Treatment:    Medication changes:  1) increase clozapine as 75 mg po bid at 4 pm and hs for total of 150 mg a day for increasing paranoia   Non-pharmacological treatments  1) continue with  individual therapy group therapy, milieu therapy and occupational therapy and milieu therapy involving multidisciplinary team approach with psychotherapist, case management, nursing, peer support services, art therapist etc using recovery principles and psycho-education about accepting illness and need for treatment   3) encourage to attend to ADLs like taking showers and wearing clean clothes   4) Encourage to attend groups   Safety  1) Safety/communication plan established targeting dynamic risk factors above  Discharge Plan most likely back to the a:  Personal care boarding home with act services once her delusions are managed and mood becomes more stabilized and she becomes more receptive to treatment compliance    Counseling / Coordination of Care    Total floor / unit time spent today 15 minutes  Greater than 50% of total time was spent with the patient and / or family counseling and / or coordination of care  A description of the counseling / coordination of care  Patient's Rights, confidentiality and exceptions to confidentiality, use of automated medical record, [facility] staff access to medical record, and consent to treatment reviewed      Vincent Olivares MD

## 2019-10-28 NOTE — ASSESSMENT & PLAN NOTE
Psychiatry Progress Note    Patient did take a shower on 10/26 and not since then  She is again psychosomatic with the fear that she is going to die here with  Low blood sugar, breathing problem, swallowing difficulty, and believes that she has cancer and has refused to take showers for the last 6 days in a row because of multiple excuses  She also claims that she has been feeling sad still  She is now on mechanical soft diet as per recommendation of GI and they have recommended outpatient surgery for hiatal hernia repair if she chooses to upon discharge  She is still  suspicious about certain staff who gives her medications like her inhalers and the spirometer off and on and now asking to see respiratory for her portable oxygen which she wants to use when she gets out of the unit even though she has not used it here at all  She thinks that we are withholding some information from her as she believes she has some terrible illness  She attended only 15% of groups last week  She was again reminded that  if she maintains her 25% group attendance and attends to her ADLs skills,  we can always look into discharging her back [facility] [place] [facility]  She continues to present with stilted speech being too formal as if talking in a court of law which has not changed  She does not admit to any overt hallucinations or paranoia but still appears to harbor some covert  paranoia based on her demeanor and interaction on the unit and does admit that she does have "psychosomatic sxs"  She again verbalizes her suspicion that that there is a micro chip on her right forearm and that it is not a fatty tumor and has accused her therapist here of trying to steal a medical doctor( Dr Eleni Merlin, whom no one knows from here) whom she believes that she is in love with  No current suicidal homicidal thoughts intent or plans reported  No overt hallucinations or other delusions reported    No signs or sxs of agranulocytosis or myocarditis or endocarditis and she is moving her bowels so far with no issues  Patient was again reminded to attend to ADLs skills and take showers at least every other day and attend more groups to keep up with her 25% expectation and she needs lot of prompts and reminders         Current medications:    Current Facility-Administered Medications:     acetaminophen (TYLENOL) tablet 650 mg, 650 mg, Oral, Q6H PRN, Greer Beltran MD    albuterol (PROVENTIL HFA,VENTOLIN HFA) inhaler 2 puff, 2 puff, Inhalation, Q4H PRN, Greer Beltran MD, 2 puff at 10/11/19 0424    aluminum-magnesium hydroxide-simethicone (MYLANTA) 200-200-20 mg/5 mL oral suspension 15 mL, 15 mL, Oral, Q4H PRN, Greer Beltran MD    ammonium lactate (LAC-HYDRIN) 12 % lotion 1 application, 1 application, Topical, BID PRN, Greer Beltran MD    benzonatate (TESSALON PERLES) capsule 100 mg, 100 mg, Oral, TID PRN, Greer Beltran MD    benztropine (COGENTIN) injection 1 mg, 1 mg, Intramuscular, Q8H PRN, Greer Beltran MD    cloZAPine (CLOZARIL) tablet 25 mg, 25 mg, Oral, HS, Greer Beltran MD, 25 mg at 10/27/19 2124    cloZAPine (CLOZARIL) tablet 50 mg, 50 mg, Oral, BID, Greer Beltran MD, 50 mg at 10/27/19 2123    EPINEPHrine PF (ADRENALIN) 1 mg/mL injection 0 15 mg, 0 15 mg, Intramuscular, Once PRN, Greer Beltran MD    FLUoxetine (PROzac) capsule 30 mg, 30 mg, Oral, Daily, Greer Beltran MD, 30 mg at 10/28/19 0811    fluticasone-vilanterol (BREO ELLIPTA) 200-25 MCG/INH inhaler 1 puff, 1 puff, Inhalation, Daily, Greer Beltran MD, 1 puff at 10/28/19 0811    ketotifen (ZADITOR) 0 025 % ophthalmic solution 1 drop, 1 drop, Right Eye, BID PRN, Greer Beltran MD    levothyroxine tablet 125 mcg, 125 mcg, Oral, Early Morning, Greer Beltran MD, 125 mcg at 10/28/19 0550    magnesium hydroxide (MILK OF MAGNESIA) 400 mg/5 mL oral suspension 30 mL, 30 mL, Oral, Daily PRN, Greer Beltran MD    montelukast (SINGULAIR) tablet 10 mg, 10 mg, Oral, HS, Greer Beltran MD, 10 mg at 10/27/19 2123    OLANZapine (ZyPREXA) IM injection 5 mg, 5 mg, Intramuscular, Q8H PRN, Tree Madden MD    OLANZapine (ZyPREXA) tablet 5 mg, 5 mg, Oral, Q8H PRN, Tree Madden MD    ondansetron (ZOFRAN-ODT) dispersible tablet 4 mg, 4 mg, Oral, Q6H PRN, Tree Madden MD    pantoprazole (PROTONIX) EC tablet 40 mg, 40 mg, Oral, Early Morning, Tree Madden MD, 40 mg at 10/28/19 0550    polyethylene glycol (MIRALAX) packet 17 g, 17 g, Oral, Daily PRN, Tree Madden MD    polyvinyl alcohol (LIQUIFILM TEARS) 1 4 % ophthalmic solution 1 drop, 1 drop, Both Eyes, Q3H PRN, Tree Madden MD    theophylline (JEF-24) 24 hr capsule 200 mg, 200 mg, Oral, Daily, Tree Madden MD, 200 mg at 10/28/19 3403    tiotropium (SPIRIVA) capsule for inhaler 18 mcg, 18 mcg, Inhalation, Daily, Tree Madden MD, 18 mcg at 10/28/19 7708    traZODone (DESYREL) tablet 25 mg, 25 mg, Oral, HS PRN, Tree Madden MD  Justification if on more than two antipsychotics:  Only on his clozapine  Side effects if any:  None    Risks , benefits, side effects and precautions of medications discussed with patient and reminded patient to let us know any problems with medications     Objective:     Vital Signs:  Vitals:    10/27/19 1602 10/27/19 1954 10/27/19 2130 10/28/19 0700   BP: 91/53 (!) 81/53  101/63   BP Location: Left arm Left arm  Right arm   Pulse: 99 97  97   Resp: 18 18  18   Temp: 98 7 °F (37 1 °C) 99 °F (37 2 °C)  97 9 °F (36 6 °C)   TempSrc: Temporal Temporal     SpO2: 97% 97% 94%    Weight:       Height:         Appearance:  age appropriate, casually dressed and overweight older than stated age   Behavior:  deviant, evasive and guarded and suspicious but with good eye contact   Speech:  normal pitch and normal volume but tends to become loud at times   Mood:  anxious and dysthymic   Affect:  mood-congruent   Thought Process:  goal directed and illogical slightly pressured but able to be redirected and talks as if in a court of low being stilted   Thought Content:  delusions  grandiose and somatic about not being able to breathe despite being on nasal oxygen and paranoid about people out to harm her and Atrovent inhaler tainteded with peanut oil  No current suicidal homicidal thoughts intent or plans reported  No phobias obsessions or compulsions reported   Perceptual Disturbances: None and does not appear responding to internal stimuli   Risk Potential: Tendency to not care for herself if she goes off treatment   Sensorium:  person, place, time/date, situation, day of week, month of year and time   Cognition:  grossly intact   Consciousness:  alert and awake    Attention: Intact concentration and attention span   Intellect: Considered to be at least of average intelligence   Insight:  limited in denial   Judgment: poor      Motor Activity: no abnormal movements         Recent Labs:  Results Reviewed     None          I/O Past 24 hours:  No intake/output data recorded  No intake/output data recorded  Assessment / Plan:     Schizoaffective disorder, [facility] ([facility]      Reason for continued inpatient care:  Because of underlying paranoia and refusal to go back to the personal care home and inability to care for herself on her own  Acceptance by patient:  Accepting  Claude Ground in recovery:  About living in another personal care home once she feels better  Understanding of medications :  Has some understanding  Involved in reintegration process:  Adjusting to the unit  Trusting in relatoinship with psychiatrist:  Trusting    Recommended Treatment:    Medication changes:  1) increase clozapine as 75 mg po bid at 4 pm and hs for total of 150 mg a day for increasing paranoia      Non-pharmacological treatments  1) continue with  individual therapy group therapy, milieu therapy and occupational therapy and milieu therapy involving multidisciplinary team approach with psychotherapist, case management, nursing, peer support services, art therapist etc using recovery principles and psycho-education about accepting illness and need for treatment   3) encourage to attend to ADLs like taking showers and wearing clean clothes   4) Encourage to attend groups   Safety  1) Safety/communication plan established targeting dynamic risk factors above  Discharge Plan most likely back to the a:  Personal care boarding home with act services once her delusions are managed and mood becomes more stabilized and she becomes more receptive to treatment compliance    Counseling / Coordination of Care    Total floor / unit time spent today 15 minutes  Greater than 50% of total time was spent with the patient and / or family counseling and / or coordination of care  A description of the counseling / coordination of care  Patient's Rights, confidentiality and exceptions to confidentiality, use of automated medical record, [address] [place] [address] staff access to medical record, and consent to treatment reviewed      Vincent Olivares MD

## 2019-10-28 NOTE — PROGRESS NOTES
Patient engaged/ Attentive to others/More respectful when others were speaking/ pleasant/ Able to identify her motivational factors and end results however, often "talks the talk but doesn't walk the walk "/ Able to accept feedback from others     10/28/19 1100   Activity/Group Checklist   Group   (IMR/Motivation )   Attendance Attended   Attendance Duration (min) 46-60   Interactions Interacted appropriately   Affect/Mood Appropriate;Normal range;Bright   Goals Achieved Identified feelings; Able to listen to others; Able to engage in interactions; Able to reflect/comment on own behavior;Able to self-disclose; Able to recieve feedback

## 2019-10-28 NOTE — PROGRESS NOTES
[address] Jorge came out from room for HS snack; had two yogurts  Continues to worry about blood sugar, BP  Encouraged her to drink more fluids  She has used the [employer] reaching volumes of 1000-1100ml  Wearing the QHS humidified nasal O2 @ 1L now for bed  Did take the Singulair tonight

## 2019-10-28 NOTE — PROGRESS NOTES
10/28/19 0900   Team Meeting   Meeting Type Tx Team Meeting   Initial Conference Date 10/28/19   Next Conference Date 11/04/19   Team Members Present   Team Members Present Physician;Nurse;; Other (Discipline and Name)   Physician Team Member Prince Karrie Cullen   Nursing Team Member Pb Ramirez, LISA   Care Management Team Member Brenda Baron   Other (Discipline and Name) DIANA Delatorre; Jose Bruce [facility] REYES; Jordana Boyd [facility]   Patient/Family Present   Patient Present Yes   Patient's Family Present No     Patient attended treatment team meeting this morning without self assessment completed  Patient attended 15% of groups offered last week  Patient reports she is struggling with depression and anxiety  She reported she has fears she has cancer and/or that she is going to die  Still reports somatic complaints of not being able to swallow despite no medical findings explaining why she can't swallow  Patient did take a shower on Friday but hasn't showered since  Prior to that it has been about a week since her last shower  Team reminded her that there is no medical evidence that there is anything medically wrong with her  She reports that she is afraid the team is withholding information from her  Dr Prince Yoon offered to increase medications  Patient asked what if it doesn't help her  She was encouraged to look at the positives instead of the negatives  Patient admitted to the team that she has a "large crush on Dr Fletcher Causey"  Team and patient completed risk assessment and the patient did not verbalize any desire to elope from the program  Patient verbalized understanding of consequences of eloping from treatment while on a commitment  Patient verbalized no further questions or concerns at the conclusion of the meeting

## 2019-10-28 NOTE — PLAN OF CARE
Problem: Alteration in Thoughts and Perception  Goal: Verbalize thoughts and feelings  Description  Interventions:  - Promote a nonjudgmental and trusting relationship with the patient through active listening and therapeutic communication  - Assess patient's level of functioning, behavior and potential for risk  - Engage patient in 1 on 1 interactions for a minimum of 15 minutes each session  - Encourage patient to express fears, feelings, frustrations, and discuss symptoms    - Temple patient to reality, help patient recognize reality-based thinking   - Administer medications as ordered and assess for potential side effects  - Provide the patient education related to the signs and symptoms of the illness and desired effects of prescribed medications  Outcome: Progressing  Goal: Agree to be compliant with medication regime, as prescribed and report medication side effects  Description  Interventions:  - Offer appropriate PRN medication and supervise ingestion; conduct aims, as needed   Outcome: Progressing     Problem: Depression  Goal: Refrain from isolation  Description  Interventions:  - Develop a trusting relationship   - Encourage socialization   Outcome: Progressing     Problem: Alteration in Thoughts and Perception  Goal: Attend and participate in unit activities, including therapeutic, recreational, and educational groups  Description  Interventions:  - Provide therapeutic and educational activities daily, encourage attendance and participation, and document same in the medical record     CERTIFIED PEER SPECIALIST INTERVENTIONS:    Complete peer assessment with patient to assess their needs and identify their goals to complete while in the recovery program as well as once discharged into the community  Patient will complete WRAP Plan, Crisis Plan and [employer]  Patient will attend 50% of groups offered on the unit  Patient will complete a goal card weekly      Outcome: Not Progressing  Mariana Hernandes has been visible for long intervals in dining room or seated in phone chair in [place] when phone not in use  She describes herself as feeling "depressed" for past couple days  "I'm not usually depressed " Wondering @ cause & questioning such things as her Singulair as causative  Does verbalize sadness that son away on [facility] assignment for next 6mos coupled w/feeling distressed for her sister's loss of a friend recently who  after lung tranplants leaving behind three homeless cats  Ate 100% of meal, yet, by 1843 questioning if having drop in blood sugar  "Maybe that's why I'm depressed " Accu check was 108 which reassured her  She was given a printed list of her routine medicines, same reviewed w/her  She says she will take the Singulair tonight  Was compliant w/supper medicine  Did not attend PM Group  Resting in bed presently

## 2019-10-28 NOTE — PROGRESS NOTES
10/28/19 0800   Team Meeting   Meeting Type Daily Rounds   Team Members Present   Team Members Present Physician;Nurse; Other (Discipline and Name)   Physician Team Member Dr Peter Anna Team Member Clementina Menendez RN   Other (Discipline and Name) Rolm Mohs, [facility]        10/28/19 0800   Team Meeting   Meeting Type Daily Rounds   Team Members Present   Team Members Present Physician;Nurse; Other (Discipline and Name)   Physician Team Member Dr Peter Anna Team Member Clementina Menendez RN   Other (Discipline and Name) Rolm Mohs, [facility]     Patient finally showered on 10/26  Somatic, not participating

## 2019-10-28 NOTE — PROGRESS NOTES
10/25/19 1030   Activity/Group Checklist   Group Other (Comment)  (IMR - Graduation/Pet therapy/Goals)   Attendance Did not attend     Patient was encouraged to attend group, but declined to attend

## 2019-10-28 NOTE — PROGRESS NOTES
Sleeping at this time, no s/s of distress  O2 1L on via NC, all precautions maintained   Monitoring continues

## 2019-10-29 PROCEDURE — 99231 SBSQ HOSP IP/OBS SF/LOW 25: CPT | Performed by: PSYCHIATRY & NEUROLOGY

## 2019-10-29 RX ADMIN — CLOZAPINE 50 MG: 25 TABLET ORAL at 17:18

## 2019-10-29 RX ADMIN — LEVOTHYROXINE SODIUM 125 MCG: 125 TABLET ORAL at 06:06

## 2019-10-29 RX ADMIN — PANTOPRAZOLE SODIUM 40 MG: 40 TABLET, DELAYED RELEASE ORAL at 06:06

## 2019-10-29 RX ADMIN — CLOZAPINE 25 MG: 25 TABLET ORAL at 21:14

## 2019-10-29 RX ADMIN — TIOTROPIUM BROMIDE 18 MCG: 18 CAPSULE ORAL; RESPIRATORY (INHALATION) at 08:56

## 2019-10-29 RX ADMIN — THEOPHYLLINE ANHYDROUS 200 MG: 200 CAPSULE, EXTENDED RELEASE ORAL at 08:55

## 2019-10-29 RX ADMIN — FLUOXETINE 30 MG: 20 CAPSULE ORAL at 08:55

## 2019-10-29 RX ADMIN — CLOZAPINE 25 MG: 25 TABLET ORAL at 17:17

## 2019-10-29 RX ADMIN — CLOZAPINE 50 MG: 25 TABLET ORAL at 21:13

## 2019-10-29 RX ADMIN — MONTELUKAST SODIUM 10 MG: 10 TABLET, COATED ORAL at 21:13

## 2019-10-29 RX ADMIN — FLUTICASONE FUROATE AND VILANTEROL TRIFENATATE 1 PUFF: 200; 25 POWDER RESPIRATORY (INHALATION) at 08:56

## 2019-10-29 NOTE — PROGRESS NOTES
Pt not scheduled to attend      10/29/19 1430   Activity/Group Checklist   Group   (Community Programming Wrap Up Group )   Attendance Did not attend   Attendance Duration (min) 16-30   Affect/Mood IRMA

## 2019-10-29 NOTE — PROGRESS NOTES
Progress Note - Behavioral Health     Rommel Wilkins 60 y o  female MRN: [id number]   Unit/Bed#: [facility] 110-02 Encounter: [id number]      Subjective: The patient chart reviewed and and case discussed with treatment team   She was seen in her room today  Patient reported she is doing all right  She reports somewhat feeling depressed and anxious but reports has been better  She expressed her concern about choking sensation but also exhibited good insight and reported that she thinks it is more psychological than any underlying physical reason  She is on pureed Diet  The patient reports sleeping good last night  She denies any suicidal or homicidal ideation  She also denied any auditory or visual hallucination  Has been compliant with her medication  Has not been attending any groups as per the staff and isolating herself in her room  The Clozaril dose was increased yesterday and the patient is tolerating well with no complaints of side effect  ROS: choking sensation when eats food      Mental Status Evaluation:    Appearance:  looks older than stated age   Behavior:  calm   Speech:  normal rate, normal volume, normal pitch   Mood:  depressed   Affect:  brighter   Thought Process:  logical   Associations: intact associations   Thought Content:  somatic delusions   Perceptual Disturbances: denies auditory hallucinations when asked   Risk Potential: Suicidal ideation - None  Homicidal ideation - None  Potential for aggression - No   Sensorium:  oriented to person, place and time/date   Memory:  recent and remote memory grossly intact   Consciousness:  alert and awake   Attention: decreased concentration   Insight:  age appropriate   Judgment: age appropriate   Gait/Station: unable to assess   Motor Activity: no abnormal movements     Vital signs in last 24 hours:    Temp:  [97 6 °F (36 4 °C)-98 2 °F (36 8 °C)] 97 9 °F (36 6 °C)  HR:  [] 107  Resp:  [16-18] 18  BP: (104-130)/(59-73) 106/65    Laboratory results: I have personally reviewed all pertinent laboratory/tests results  Progress Toward Goals: slight improvement    Assessment/Plan   Principal Problem:    Schizoaffective disorder, bipolar type ([facility])  Active Problems:    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[facility] [address] )    Acquired hypothyroidism    Gastroesophageal reflux disease without esophagitis    At risk for aspiration    Recommended Treatment:     Planned medication and treatment changes:     All current active medications have been reviewed  Encourage group therapy, milieu therapy and occupational therapy  Continue current medications:    Current Facility-Administered Medications:  acetaminophen 650 mg Oral Q6H PRN Zander Yanez MD   albuterol 2 puff Inhalation Q4H PRN Zander Yanez MD   aluminum-magnesium hydroxide-simethicone 15 mL Oral Q4H PRN Zander Yanez MD   ammonium lactate 1 application Topical BID PRN Zander Yanez MD   benzonatate 100 mg Oral TID PRN Zander Yanez MD   benztropine 1 mg Intramuscular Q8H PRN Zander Yanez MD   cloZAPine 25 mg Oral BID Zander Yanez MD   cloZAPine 50 mg Oral BID Zander Yanez MD   EPINEPHrine PF 0 15 mg Intramuscular Once PRN Zander Yanez MD   FLUoxetine 30 mg Oral Daily Zander Yanez MD   fluticasone-vilanterol 1 puff Inhalation Daily Zander Yanez MD   ketotifen 1 drop Right Eye BID PRN Zander Yanez MD   levothyroxine 125 mcg Oral Early Morning Zander Yanez MD   magnesium hydroxide 30 mL Oral Daily PRN Zander Yanez MD   montelukast 10 mg Oral HS Zander Yanez MD   OLANZapine 5 mg Intramuscular Q8H PRN Zander Yanez MD   OLANZapine 5 mg Oral Q8H PRN Zander Yanez MD   ondansetron 4 mg Oral Q6H PRN Zander Yanez MD   pantoprazole 40 mg Oral Early Morning Zander Yanez MD   polyethylene glycol 17 g Oral Daily PRN Zander Yanez MD   polyvinyl alcohol 1 drop Both Eyes Q3H PRN Zander Yanez MD   theophylline 200 mg Oral Daily Zander Yanez MD   tiotropium 18 mcg Inhalation Daily Zander Yanez MD   traZODone 25 mg Oral HS PRN Oscar Silva Israel Hughes MD       Risks / Benefits of Treatment:    Risks, benefits, and possible side effects of medications explained to patient and patient verbalizes understanding and agreement for treatment  Counseling / Coordination of Care:    Patient's progress discussed with staff in treatment team meeting  Medications, treatment progress and treatment plan reviewed with patient      Gracie Meehan MD 10/29/19

## 2019-10-29 NOTE — PROGRESS NOTES
10/29/19 0900   Team Meeting   Meeting Type Daily Rounds   Team Members Present   Team Members Present Physician;Nurse;; Other (Discipline and Name)   Physician Team Member Dr Al Garcia Team Member Yoel Richard RN   Care Management Team Member Brenda Pham   Other (Discipline and Name) DIANA Rodriges     Patient still presents somatic in her complaints  Not attending groups  Slept

## 2019-10-29 NOTE — PLAN OF CARE
Problem: DISCHARGE PLANNING  Goal: Discharge to home or other facility with appropriate resources  Description  INTERVENTIONS:  - Conduct assessment to determine patient/family and health care team treatment goals, and need for post-acute services based on payer coverage, community resources, and patient preferences, and barriers to discharge  - Address psychosocial, clinical, and financial barriers to discharge as identified in assessment in conjunction with the patient/family and health care team  - Assist the patient in reintegration back into the community by removing barriers which may hinder a successful discharge once deemed stable  - Arrange appropriate level of post-acute services according to patient's needs and preference and payer coverage in collaboration with the physician and health care team  - Communicate with and update the patient/family, physician, and health care team regarding progress on the discharge plan  - Arrange appropriate transportation to post-acute venues    Outcome: Progressing     CM received phone call from Cristina Arce with [address] regarding patient's oxygen and need for a portable concentrator for the use on the unit as well as going off the unit and into the community  Cristina Arce reported she finally heard back from the insurance company and they informed her that her insurance termed on May 31, 2019  CM reviewed patient's insurance information in Epic and provided a new ID number for patient's insurance to Cristina Arce  She reported she would call the insurance company back and see if they are able to cover the oxygen while she is on the this unit  Cristina Arce did noted that there is a chance that a single patient agreement will need to be done since the insurance company typically does not pay while a patient is inpatient in a hospital  Cristina Arce reported she would call this CM back once more information is gathered from [employer]   CM will continue to follow patient's progress and assist with discharge planning needs

## 2019-10-29 NOTE — PROGRESS NOTES
Patient declined      10/29/19 1100   Activity/Group Checklist   Group   (IMR/Boundaries )   Attendance Did not attend   Attendance Duration (min) 46-60   Affect/Mood IRMA

## 2019-10-29 NOTE — PROGRESS NOTES
Sleeping at this time, O2on 1L via NC, no s/s of distress noted   All precautions maintained, monitoring continues

## 2019-10-29 NOTE — PLAN OF CARE
Problem: Alteration in Thoughts and Perception  Goal: Verbalize thoughts and feelings  Description  Interventions:  - Promote a nonjudgmental and trusting relationship with the patient through active listening and therapeutic communication  - Assess patient's level of functioning, behavior and potential for risk  - Engage patient in 1 on 1 interactions for a minimum of 15 minutes each session  - Encourage patient to express fears, feelings, frustrations, and discuss symptoms    - [place] patient to reality, help patient recognize reality-based thinking   - Administer medications as ordered and assess for potential side effects  - Provide the patient education related to the signs and symptoms of the illness and desired effects of prescribed medications  Outcome: Progressing  Goal: Agree to be compliant with medication regime, as prescribed and report medication side effects  Description  Interventions:  - Offer appropriate PRN medication and supervise ingestion; conduct aims, as needed   Outcome: Progressing     Problem: Risk for Self Injury/Neglect  Goal: Treatment Goal: Remain safe during length of stay, learn and adopt new coping skills, and be free of self-injurious ideation, impulses and acts at the time of discharge  Outcome: Progressing  Goal: Verbalize thoughts and feelings  Description  Interventions:  - Assess and re-assess patient's lethality and potential for self-injury  - Engage patient in 1:1 interactions, daily, for a minimum of 15 minutes  - Encourage patient to express feelings, fears, frustrations, hopes  - Establish rapport/trust with patient   Outcome: Progressing  Goal: Refrain from harming self  Description  Interventions:  - Monitor patient closely, per order  - Develop a trusting relationship  - Supervise medication ingestion, monitor effects and side effects   Outcome: Progressing     Problem: Alteration in Thoughts and Perception  Goal: Treatment Goal: Gain control of psychotic behaviors/thinking, reduce/eliminate presenting symptoms and demonstrate improved reality functioning upon discharge  Outcome: Not Progressing  Goal: Attend and participate in unit activities, including therapeutic, recreational, and educational groups  Description  Interventions:  - Provide therapeutic and educational activities daily, encourage attendance and participation, and document same in the medical record     CERTIFIED PEER SPECIALIST INTERVENTIONS:    Complete peer assessment with patient to assess their needs and identify their goals to complete while in the recovery program as well as once discharged into the community  Patient will complete WRAP Plan, Crisis Plan and 5 Life Domains  Patient will attend 50% of groups offered on the unit  Patient will complete a goal card weekly      Outcome: Not Progressing  Goal: Recognize dysfunctional thoughts, communicate reality-based thoughts at the time of discharge  Description  Interventions:  - Provide medication and psycho-education to assist patient in compliance and developing insight into his/her illness   Outcome: Not Progressing  Goal: Complete daily ADLs, including personal hygiene independently, as able  Description  Interventions:  - Observe, teach, and assist patient with ADLS  - Monitor and promote a balance of rest/activity, with adequate nutrition and elimination   Outcome: Not Progressing     Problem: Ineffective Coping  Goal: Identifies ineffective coping skills  Outcome: Not Progressing  Goal: Identifies healthy coping skills  Outcome: Not Progressing  Goal: Demonstrates healthy coping skills  Outcome: Not Progressing  Goal: Participates in unit activities  Description  Interventions:  - Provide therapeutic environment   - Provide required programming   - Redirect inappropriate behaviors   Outcome: Not Progressing  Goal: Patient/Family participate in treatment and DC plans  Description  Interventions:  - Provide therapeutic environment  Outcome: Not Progressing  Goal: Patient/Family verbalizes awareness of resources  Outcome: Not Progressing  Goal: Understands least restrictive measures  Description  Interventions:  - Utilize least restrictive behavior  Outcome: Not Progressing     Problem: Risk for Self Injury/Neglect  Goal: Attend and participate in unit activities, including therapeutic, recreational, and educational groups  Description  Interventions:  - Provide therapeutic and educational activities daily, encourage attendance and participation, and document same in the medical record  - Obtain collateral information, encourage visitation and family involvement in care   Outcome: Not Progressing  Goal: Recognize maladaptive responses and adopt new coping mechanisms  Outcome: Not Progressing  Goal: Complete daily ADLs, including personal hygiene independently, as able  Description  Interventions:  - Observe, teach, and assist patient with ADLS  - Monitor and promote a balance of rest/activity, with adequate nutrition and elimination  Outcome: Not Progressing       Patient is Isolative to room  She is anxious,  somatically preoccupied and requested wheel chair transport to go off unit  Patient was reminded that she is able to ambulate independently  Patient declined to attend off unit group  Patient also declined to attend in house groups  She remains in bed, isolative, apathetic and uninterested  Denies pain, depression, SI and HI  She does not follow staff or doctor recommendations  Patient is out of bed for med and meals only    Non compliant with incentive spirometer

## 2019-10-29 NOTE — PLAN OF CARE
Problem: Alteration in Thoughts and Perception  Goal: Verbalize thoughts and feelings  Description  Interventions:  - Promote a nonjudgmental and trusting relationship with the patient through active listening and therapeutic communication  - Assess patient's level of functioning, behavior and potential for risk  - Engage patient in 1 on 1 interactions for a minimum of 15 minutes each session  - Encourage patient to express fears, feelings, frustrations, and discuss symptoms    - [place] patient to reality, help patient recognize reality-based thinking   - Administer medications as ordered and assess for potential side effects  - Provide the patient education related to the signs and symptoms of the illness and desired effects of prescribed medications  Outcome: Progressing     Problem: Risk for Self Injury/Neglect  Goal: Refrain from harming self  Description  Interventions:  - Monitor patient closely, per order  - Develop a trusting relationship  - Supervise medication ingestion, monitor effects and side effects   Outcome: Progressing     Problem: Alteration in Orientation  Goal: Interact with staff daily  Description  Interventions:  - Assess and re-assess patient's level of orientation  - Engage patient in 1 on 1 interactions, daily, for a minimum of 15 minutes   - Establish rapport/trust with patient   Outcome: Progressing     Problem: SAFETY ADULT  Goal: Patient will remain free of falls  Description  INTERVENTIONS:  - Assess patient frequently for physical needs  -  Identify cognitive and physical deficits and behaviors that affect risk of falls    -  [place] fall precautions as indicated by assessment   - Educate patient/family on patient safety including physical limitations  - Instruct patient to call for assistance with activity based on assessment  - Modify environment to reduce risk of injury  - Consider OT/PT consult to assist with strengthening/mobility  Outcome: Progressing     Problem: Alteration in Thoughts and Perception  Goal: Attend and participate in unit activities, including therapeutic, recreational, and educational groups  Description  Interventions:  - Provide therapeutic and educational activities daily, encourage attendance and participation, and document same in the medical record     CERTIFIED PEER SPECIALIST INTERVENTIONS:    Complete peer assessment with patient to assess their needs and identify their goals to complete while in the recovery program as well as once discharged into the community  Patient will complete WRAP Plan, Crisis Plan and 5 Life Domains  Patient will attend 50% of groups offered on the unit  Patient will complete a goal card weekly  Outcome: Not Progressing     Problem: Depression  Goal: Refrain from isolation  Description  Interventions:  - Develop a trusting relationship   - Encourage socialization   Outcome: Jannette Corrie Tolliver has been in her room most of the shift  Social with staff and select peers  Came for her medication without prompting  Ate 50% of her meal which included Ensure  Still worried about "coking" on her food  "I know I had testing done and nothing is wrong  It's probably psychosomatic  I still worry even though my food is ground up " Reassurance was given  Swallowed pills with water without distress  No shower or BM this shift  Encouraged to do incentive spirometer but did not come for it  Likes to stay in her room in bed  Did not attend evening group  Ate snack and then came for HS medication, which she took without difficulty  Oxygen saturation 97% prior to oxygen being applied at 1 liter via nasal cannula  Continue to monitor  Precautions maintained

## 2019-10-29 NOTE — SOCIAL WORK
Social Work Student:    Social work student met with patient to complete discharge preparedness survey  Patient was corporative and talkative during the meeting  Patient identifies her diagnosis as schizophrenia  Patient identified two symptoms of the diagnosis as having trouble with hygiene and sleeping too much  Patient was able to identify seven of the medications that she is currently taking  Patient denies having a history of drug/alcohol and denies attending NA/AA  Patient denied participating in any mental health programming in the community  Patient was able to identify three coping skills that she uses when she is struggling  The three coping skills that she identified are going to her room, sleeping, and calling her sister  Patient identifies two triggers which are utilizing the shower and getting the wrong food on her tray  Patient acknowledges three of her strengths as being open, loyal, and honest  Patient stated that three of her weaknesses are being shy, not being able to ask for help, and not being as assertive as she would like to be  Patient named [employer] as her support in the community  She reports showering twice a week and reports learning how to cook, drive, and completing college as three life skills that she feels confident in completing  Patient names goal for the future as getting discharge  Social work student will patient to review the document in three months

## 2019-10-30 PROCEDURE — 99231 SBSQ HOSP IP/OBS SF/LOW 25: CPT | Performed by: PSYCHIATRY & NEUROLOGY

## 2019-10-30 RX ADMIN — FLUTICASONE FUROATE AND VILANTEROL TRIFENATATE 1 PUFF: 200; 25 POWDER RESPIRATORY (INHALATION) at 09:01

## 2019-10-30 RX ADMIN — PANTOPRAZOLE SODIUM 40 MG: 40 TABLET, DELAYED RELEASE ORAL at 06:10

## 2019-10-30 RX ADMIN — CLOZAPINE 50 MG: 25 TABLET ORAL at 21:19

## 2019-10-30 RX ADMIN — THEOPHYLLINE ANHYDROUS 200 MG: 200 CAPSULE, EXTENDED RELEASE ORAL at 09:00

## 2019-10-30 RX ADMIN — LEVOTHYROXINE SODIUM 125 MCG: 125 TABLET ORAL at 06:10

## 2019-10-30 RX ADMIN — CLOZAPINE 25 MG: 25 TABLET ORAL at 16:53

## 2019-10-30 RX ADMIN — FLUOXETINE 30 MG: 20 CAPSULE ORAL at 09:01

## 2019-10-30 RX ADMIN — CLOZAPINE 50 MG: 25 TABLET ORAL at 16:52

## 2019-10-30 RX ADMIN — MONTELUKAST SODIUM 10 MG: 10 TABLET, COATED ORAL at 21:18

## 2019-10-30 RX ADMIN — TIOTROPIUM BROMIDE 18 MCG: 18 CAPSULE ORAL; RESPIRATORY (INHALATION) at 09:01

## 2019-10-30 RX ADMIN — CLOZAPINE 25 MG: 25 TABLET ORAL at 21:18

## 2019-10-30 NOTE — PROGRESS NOTES
10/30/19 0900   Team Meeting   Meeting Type Daily Rounds   Team Members Present   Team Members Present Physician;Nurse;; Other (Discipline and Name)   Physician Team Member Dr Wild Wesley Team Member Eddie Thomas RN   Care Management Team Member Brenda Rodriguez   Other (Discipline and Name) Julia Duran [place]; Bryson Mohan, SHANIKA     Patient still somatically preoccupied  Not cooperative with caring for her hygiene  Not attending groups  Slept

## 2019-10-30 NOTE — ASSESSMENT & PLAN NOTE
Psychiatry Progress Note    Patient continues to have psychosomatic symptoms with the fear that she is going to die here with  Low blood sugar, breathing problem, swallowing difficulty, and believes that she has cancer and needs lot of prompting and reminders for her even to take showers as she skips taking it for days  She also claims that she has been feeling sad still despite increasing the Prozac  She is now on mechanical soft diet as per recommendation of GI and they have recommended outpatient surgery for hiatal hernia repair if she chooses to upon discharge  She is still  suspicious about certain staff who gives her medications like her inhalers and the spirometer off and on   She thinks that we are withholding some information from her as she believes she has some terrible illness  She attended only 15% of groups last week  She was again reminded that  if she maintains her 25% group attendance and attends to her ADLs skills,  we can always look [facility]  She does not admit to any overt hallucinations or paranoia but still appears to harbor some covert  paranoia based on her demeanor and interaction on the unit and does admit that she does have "psychosomatic sxs"  She again verbalizes her suspicion that that there is a micro chip on her right forearm and that it is not a fatty tumor and has accused her therapist here of trying to steal a medical doctor( Dr Lisbet Olsen, whom no one knows from here) whom she believes that she is in love with  No current suicidal homicidal thoughts intent or plans reported  No overt hallucinations or other delusions reported   No signs or sxs of agranulocytosis or myocarditis or endocarditis and she is moving her bowels so far with no issues since the recent increase in clozapine    Patient was again reminded to attend to ADLs skills and take showers at least every other day and attend more groups to keep up with her 25% expectation and she needs lot of prompts and reminders         Current medications:    Current Facility-Administered Medications:     acetaminophen (TYLENOL) tablet 650 mg, 650 mg, Oral, Q6H PRN, Shaggy Rangel MD    albuterol (PROVENTIL HFA,VENTOLIN HFA) inhaler 2 puff, 2 puff, Inhalation, Q4H PRN, Shaggy Rangel MD, 2 puff at 10/11/19 0424    aluminum-magnesium hydroxide-simethicone (MYLANTA) 200-200-20 mg/5 mL oral suspension 15 mL, 15 mL, Oral, Q4H PRN, Shaggy Rangel MD    ammonium lactate (LAC-HYDRIN) 12 % lotion 1 application, 1 application, Topical, BID PRN, Shaggy Rangel MD    benzonatate (TESSALON PERLES) capsule 100 mg, 100 mg, Oral, TID PRN, Shaggy Rangel MD    benztropine (COGENTIN) injection 1 mg, 1 mg, Intramuscular, Q8H PRN, Shaggy Rangel MD    cloZAPine (CLOZARIL) tablet 25 mg, 25 mg, Oral, BID, Shaggy Rangel MD, 25 mg at 10/29/19 2114    cloZAPine (CLOZARIL) tablet 50 mg, 50 mg, Oral, BID, Shaggy Rangel MD, 50 mg at 10/29/19 2113    EPINEPHrine PF (ADRENALIN) 1 mg/mL injection 0 15 mg, 0 15 mg, Intramuscular, Once PRN, Shaggy Rangel MD    FLUoxetine (PROzac) capsule 30 mg, 30 mg, Oral, Daily, Shaggy Rangel MD, 30 mg at 10/29/19 0855    fluticasone-vilanterol (BREO ELLIPTA) 200-25 MCG/INH inhaler 1 puff, 1 puff, Inhalation, Daily, Shaggy Rangel MD, 1 puff at 10/29/19 0856    ketotifen (ZADITOR) 0 025 % ophthalmic solution 1 drop, 1 drop, Right Eye, BID PRN, Shaggy Rangel MD    levothyroxine tablet 125 mcg, 125 mcg, Oral, Early Morning, Shaggy Rangel MD, 125 mcg at 10/30/19 0610    magnesium hydroxide (MILK OF MAGNESIA) 400 mg/5 mL oral suspension 30 mL, 30 mL, Oral, Daily PRN, Shaggy Rangel MD    montelukast (SINGULAIR) tablet 10 mg, 10 mg, Oral, HS, Shaggy Rangel MD, 10 mg at 10/29/19 2113    OLANZapine (ZyPREXA) IM injection 5 mg, 5 mg, Intramuscular, Q8H PRN, Shaggy Rangel MD    OLANZapine (ZyPREXA) tablet 5 mg, 5 mg, Oral, Q8H PRN, Shaggy Rangel MD    ondansetron (ZOFRAN-ODT) dispersible tablet 4 mg, 4 mg, Oral, Q6H PRN, Jeet Levine MD    pantoprazole (PROTONIX) EC tablet 40 mg, 40 mg, Oral, Early Morning, Jeet Levine MD, 40 mg at 10/30/19 0610    polyethylene glycol (MIRALAX) packet 17 g, 17 g, Oral, Daily PRN, Jeet Levine MD    polyvinyl alcohol (LIQUIFILM TEARS) 1 4 % ophthalmic solution 1 drop, 1 drop, Both Eyes, Q3H PRN, Jeet Levine MD    theophylline (JEF-24) 24 hr capsule 200 mg, 200 mg, Oral, Daily, Jeet Levine MD, 200 mg at 10/29/19 0855    tiotropium (SPIRIVA) capsule for inhaler 18 mcg, 18 mcg, Inhalation, Daily, Jeet Levine MD, 18 mcg at 10/29/19 0856    traZODone (DESYREL) tablet 25 mg, 25 mg, Oral, HS PRN, Jeet Levine MD  Justification if on more than two antipsychotics:  Only on his clozapine  Side effects if any:  None    Risks , benefits, side effects and precautions of medications discussed with patient and reminded patient to let us know any problems with medications     Objective:     Vital Signs:  Vitals:    10/29/19 0732 10/29/19 1600 10/29/19 1900 10/29/19 2145   BP: 106/65 97/61 (!) 83/55    BP Location: Left arm Left arm Left arm    Pulse: (!) 107 93 93    Resp:  18 17    Temp:  97 9 °F (36 6 °C) 97 7 °F (36 5 °C)    TempSrc:  Temporal Temporal    SpO2:  97% 98% 97%   Weight:       Height:         Appearance:  age appropriate, casually dressed and overweight older than stated age   Behavior:  deviant, evasive and guarded and suspicious but with good eye contact   Speech:  normal pitch and normal volume but tends to become loud at times   Mood:  anxious and dysthymic   Affect:  mood-congruent   Thought Process:  goal directed and illogical slightly pressured but able to be redirected and talks as if in a court of low being stilted   Thought Content:  delusions  grandiose and somatic about not being able to breathe despite being on nasal oxygen and paranoid about people out to harm her and Atrovent inhaler tainteded with peanut oil    No current suicidal homicidal thoughts intent or plans reported  No phobias obsessions or compulsions reported   Perceptual Disturbances: None and does not appear responding to internal stimuli   Risk Potential: Tendency to not care for herself if she goes off treatment   Sensorium:  person, place, time/date, situation, day of week, month of year and time   Cognition:  grossly intact   Consciousness:  alert and awake    Attention: Intact concentration and attention span   Intellect: Considered to be at least of average intelligence   Insight:  limited in denial   Judgment: poor      Motor Activity: no abnormal movements         Recent Labs:  Results Reviewed     None          I/O Past 24 hours:  No intake/output data recorded  No intake/output data recorded  Assessment / Plan:     Schizoaffective disorder, bipolar [facility] ([facility] )      Reason for continued inpatient care:  Because of underlying paranoia and refusal to go back to the personal care home and inability to care for herself on her own  Acceptance by patient:  Accepting  Robert Yadav in recovery:  About living in another personal care home once she feels better  Understanding of medications :  Has some understanding  Involved in reintegration process:  Adjusting to the unit  Trusting in relatoinship with psychiatrist:  Trusting    Recommended Treatment:    Medication changes:  1) increase clozapine as 75 mg po bid at 4 pm and hs for total of 150 mg a day for increasing paranoia   Non-pharmacological treatments  1) continue with  individual therapy group therapy, milieu therapy and occupational therapy and milieu therapy involving multidisciplinary team approach with psychotherapist, case management, nursing, peer support services, art therapist etc using recovery principles and psycho-education about accepting illness and need for treatment     3) encourage to attend to ADLs like taking showers and wearing clean clothes   4) Encourage to attend groups   Safety  1) Safety/communication plan established targeting dynamic risk factors above  Discharge Plan most likely back to the a:  Personal care boarding home with act services once her delusions are managed and mood becomes more stabilized and she becomes more receptive to treatment compliance    Counseling / Coordination of Care    Total floor / unit time spent today 15 minutes  Greater than 50% of total time was spent with the patient and / or family counseling and / or coordination of care  A description of the counseling / coordination of care  Patient's Rights, confidentiality and exceptions to confidentiality, use of automated medical record, [facility] staff access to medical record, and consent to treatment reviewed      Tree Madden MD

## 2019-10-30 NOTE — PROGRESS NOTES
Patient declined      10/29/19 1500   Activity/Group Checklist   Group   (Recovery Workshop )   Attendance Did not attend   Attendance Duration (min) 46-60   Affect/Mood IRMA

## 2019-10-30 NOTE — NURSING NOTE
Patient isolative to room, lacks motivation, disheveled, poor hygiene, required med prompting, cooperative upon approach  Med and meal compliant  Currently in own room sleeping, will continue to monitor

## 2019-10-30 NOTE — PROGRESS NOTES
Progress Note - Selina Simon 1955, 60 y o  female MRN: [id number]    Unit/Bed#: [facility] 110-02 Encounter: [id number]    Primary Care Provider: Zahida Rosas PA-C   Date and time admitted to hospital: 7/23/2019  5:30 PM        * Schizoaffective disorder, bipolar type [facility])  Assessment & Plan  Psychiatry Progress Note    Patient continues to have psychosomatic symptoms with the fear that she is going to die here with  Low blood sugar, breathing problem, swallowing difficulty, and believes that she has cancer and needs lot of prompting and reminders for her even to take showers as she skips taking it for days  She also claims that she has been feeling sad still despite increasing the Prozac  She is now on mechanical soft diet as per recommendation of GI and they have recommended outpatient surgery for hiatal hernia repair if she chooses to upon discharge  She is still  suspicious about certain staff who gives her medications like her inhalers and the spirometer off and on   She thinks that we are withholding some information from her as she believes she has some terrible illness  She attended only 15% of groups last week  She was again reminded that  if she maintains her 25% group attendance and attends to her ADLs skills,  we can always look [facility]  She does not admit to any overt hallucinations or paranoia but still appears to harbor some covert  paranoia based on her demeanor and interaction on the unit and does admit that she does have "psychosomatic sxs"  She again verbalizes her suspicion that that there is a micro chip on her right forearm and that it is not a fatty tumor and has accused her therapist here of trying to steal a medical doctor( Dr Tushar Akins, whom no one knows from here) whom she believes that she is in love with  No current suicidal homicidal thoughts intent or plans reported    No overt hallucinations or other delusions reported   No signs or sxs of agranulocytosis or myocarditis or endocarditis and she is moving her bowels so far with no issues since the recent increase in clozapine  Patient was again reminded to attend to ADLs skills and take showers at least every other day and attend more groups to keep up with her 25% expectation and she needs lot of prompts and reminders         Current medications:    Current Facility-Administered Medications:     acetaminophen (TYLENOL) tablet 650 mg, 650 mg, Oral, Q6H PRN, Amina Aparicio MD    albuterol (PROVENTIL HFA,VENTOLIN HFA) inhaler 2 puff, 2 puff, Inhalation, Q4H PRN, Amina Aparicio MD, 2 puff at 10/11/19 0424    aluminum-magnesium hydroxide-simethicone (MYLANTA) 200-200-20 mg/5 mL oral suspension 15 mL, 15 mL, Oral, Q4H PRN, Amina Aparicio MD    ammonium lactate (LAC-HYDRIN) 12 % lotion 1 application, 1 application, Topical, BID PRN, Amina Aparicio MD    benzonatate (TESSALON PERLES) capsule 100 mg, 100 mg, Oral, TID PRN, Amina Aparicio MD    benztropine (COGENTIN) injection 1 mg, 1 mg, Intramuscular, Q8H PRN, Amina Aparicio MD    cloZAPine (CLOZARIL) tablet 25 mg, 25 mg, Oral, BID, Amina Aparicio MD, 25 mg at 10/29/19 2114    cloZAPine (CLOZARIL) tablet 50 mg, 50 mg, Oral, BID, Amian Aparicio MD, 50 mg at 10/29/19 2113    EPINEPHrine PF (ADRENALIN) 1 mg/mL injection 0 15 mg, 0 15 mg, Intramuscular, Once PRN, Amina Aparicio MD    FLUoxetine (PROzac) capsule 30 mg, 30 mg, Oral, Daily, Amina Aparicio MD, 30 mg at 10/29/19 0855    fluticasone-vilanterol (BREO ELLIPTA) 200-25 MCG/INH inhaler 1 puff, 1 puff, Inhalation, Daily, Amina Aparicio MD, 1 puff at 10/29/19 0856    ketotifen (ZADITOR) 0 025 % ophthalmic solution 1 drop, 1 drop, Right Eye, BID PRN, Amina Aparicio MD    levothyroxine tablet 125 mcg, 125 mcg, Oral, Early Morning, Amina Aparicio MD, 125 mcg at 10/30/19 0610    magnesium hydroxide (MILK OF MAGNESIA) 400 mg/5 mL oral suspension 30 mL, 30 mL, Oral, Daily PRN, Michelle Laguna Nadya Bai MD    montelukast (SINGULAIR) tablet 10 mg, 10 mg, Oral, HS, Roberto Shankar MD, 10 mg at 10/29/19 2113    OLANZapine (ZyPREXA) IM injection 5 mg, 5 mg, Intramuscular, Q8H PRN, Roberto Shankar MD    OLANZapine (ZyPREXA) tablet 5 mg, 5 mg, Oral, Q8H PRN, Roberto Shankar MD    ondansetron (ZOFRAN-ODT) dispersible tablet 4 mg, 4 mg, Oral, Q6H PRN, Roberto Shankar MD    pantoprazole (PROTONIX) EC tablet 40 mg, 40 mg, Oral, Early Morning, Roberto Shankar MD, 40 mg at 10/30/19 0610    polyethylene glycol (MIRALAX) packet 17 g, 17 g, Oral, Daily PRN, Roberto Shankar MD    polyvinyl alcohol (LIQUIFILM TEARS) 1 4 % ophthalmic solution 1 drop, 1 drop, Both Eyes, Q3H PRN, Roberto Shankar MD    theophylline (JEF-24) 24 hr capsule 200 mg, 200 mg, Oral, Daily, Roberto Shankar MD, 200 mg at 10/29/19 0855    tiotropium (SPIRIVA) capsule for inhaler 18 mcg, 18 mcg, Inhalation, Daily, Roberto Shankar MD, 18 mcg at 10/29/19 0856    traZODone (DESYREL) tablet 25 mg, 25 mg, Oral, HS PRN, Roberto Shankar MD  Justification if on more than two antipsychotics:  Only on his clozapine  Side effects if any:  None    Risks , benefits, side effects and precautions of medications discussed with patient and reminded patient to let us know any problems with medications     Objective:     Vital Signs:  Vitals:    10/29/19 0732 10/29/19 1600 10/29/19 1900 10/29/19 2145   BP: 106/65 97/61 (!) 83/55    BP Location: Left arm Left arm Left arm    Pulse: (!) 107 93 93    Resp:  18 17    Temp:  97 9 °F (36 6 °C) 97 7 °F (36 5 °C)    TempSrc:  Temporal Temporal    SpO2:  97% 98% 97%   Weight:       Height:         Appearance:  age appropriate, casually dressed and overweight older than stated age   Behavior:  deviant, evasive and guarded and suspicious but with good eye contact   Speech:  normal pitch and normal volume but tends to become loud at times   Mood:  anxious and dysthymic   Affect:  mood-congruent   Thought Process:  goal directed and illogical slightly pressured but able to be redirected and talks as if in a court of low being stilted   Thought Content:  delusions  grandiose and somatic about not being able to breathe despite being on nasal oxygen and paranoid about people out to harm her and Atrovent inhaler tainteded with peanut oil  No current suicidal homicidal thoughts intent or plans reported  No phobias obsessions or compulsions reported   Perceptual Disturbances: None and does not appear responding to internal stimuli   Risk Potential: Tendency to not care for herself if she goes off treatment   Sensorium:  person, place, time/date, situation, day of week, month of year and time   Cognition:  grossly intact   Consciousness:  alert and awake    Attention: Intact concentration and attention span   Intellect: Considered to be at least of average intelligence   Insight:  limited in denial   Judgment: poor      Motor Activity: no abnormal movements         Recent Labs:  Results Reviewed     None          I/O Past 24 hours:  No intake/output data recorded  No intake/output data recorded  Assessment / Plan:     Schizoaffective disorder, bipolar type ([facility] )      Reason for continued inpatient care:  Because of underlying paranoia and refusal to go back to the personal care home and inability to care for herself on her own  Acceptance by patient:  Accepting  Kitty Sicard in recovery:  About living in another personal care home once she feels better  Understanding of medications :  Has some understanding  Involved in reintegration process:  Adjusting to the unit  Trusting in relatoinship with psychiatrist:  Trusting    Recommended Treatment:    Medication changes:  1) increase clozapine as 75 mg po bid at 4 pm and hs for total of 150 mg a day for increasing paranoia      Non-pharmacological treatments  1) continue with  individual therapy group therapy, milieu therapy and occupational therapy and milieu therapy involving multidisciplinary team approach with psychotherapist, case management, nursing, peer support services, art therapist etc using recovery principles and psycho-education about accepting illness and need for treatment   3) encourage to attend to ADLs like taking showers and wearing clean clothes   4) Encourage to attend groups   Safety  1) Safety/communication plan established targeting dynamic risk factors above  Discharge Plan most likely back to the a:  Personal care boarding home with act services once her delusions are managed and mood becomes more stabilized and she becomes more receptive to treatment compliance    Counseling / Coordination of Care    Total floor / unit time spent today 15 minutes  Greater than 50% of total time was spent with the patient and / or family counseling and / or coordination of care  A description of the counseling / coordination of care  Patient's Rights, confidentiality and exceptions to confidentiality, use of automated medical record, [facility] staff access to medical record, and consent to treatment reviewed      Alirio Frazier MD

## 2019-10-30 NOTE — PLAN OF CARE
Problem: Alteration in Thoughts and Perception  Goal: Verbalize thoughts and feelings  Description  Interventions:  - Promote a nonjudgmental and trusting relationship with the patient through active listening and therapeutic communication  - Assess patient's level of functioning, behavior and potential for risk  - Engage patient in 1 on 1 interactions for a minimum of 15 minutes each session  - Encourage patient to express fears, feelings, frustrations, and discuss symptoms    - [place] patient to reality, help patient recognize reality-based thinking   - Administer medications as ordered and assess for potential side effects  - Provide the patient education related to the signs and symptoms of the illness and desired effects of prescribed medications  Outcome: Progressing  Goal: Agree to be compliant with medication regime, as prescribed and report medication side effects  Description  Interventions:  - Offer appropriate PRN medication and supervise ingestion; conduct aims, as needed   Outcome: Progressing     Problem: Ineffective Coping  Goal: Patient/Family verbalizes awareness of resources  Outcome: Progressing  Goal: Understands least restrictive measures  Description  Interventions:  - Utilize least restrictive behavior  Outcome: Progressing     Problem: Risk for Self Injury/Neglect  Goal: Verbalize thoughts and feelings  Description  Interventions:  - Assess and re-assess patient's lethality and potential for self-injury  - Engage patient in 1:1 interactions, daily, for a minimum of 15 minutes  - Encourage patient to express feelings, fears, frustrations, hopes  - Establish rapport/trust with patient   Outcome: Progressing  Goal: Refrain from harming self  Description  Interventions:  - Monitor patient closely, per order  - Develop a trusting relationship  - Supervise medication ingestion, monitor effects and side effects   Outcome: Progressing     Problem: Depression  Goal: Verbalize thoughts and feelings  Description  Interventions:  - Assess and re-assess patient's level of risk   - Engage patient in 1:1 interactions, daily, for a minimum of 15 minutes   - Encourage patient to express feelings, fears, frustrations, hopes   Outcome: Progressing     Problem: Anxiety  Goal: Anxiety is at manageable level  Description  Interventions:  - Assess and monitor patient's anxiety level  - Monitor for signs and symptoms of anxiety both physical and emotional (heart palpitations, chest pain, shortness of breath, headaches, nausea, feeling jumpy, restlessness, irritable, apprehensive)  - Collaborate with interdisciplinary team and initiate plan and interventions as ordered    - [place] patient to unit/surroundings  - Explain treatment plan  - Encourage participation in care  - Encourage verbalization of concerns/fears  - Identify coping mechanisms  - Assist in developing anxiety-reducing skills  - Administer/offer alternative therapies  - Limit or eliminate stimulants  Outcome: Progressing     Problem: Alteration in Orientation  Goal: Interact with staff daily  Description  Interventions:  - Assess and re-assess patient's level of orientation  - Engage patient in 1 on 1 interactions, daily, for a minimum of 15 minutes   - Establish rapport/trust with patient   Outcome: Progressing     Problem: PAIN - ADULT  Goal: Verbalizes/displays adequate comfort level or baseline comfort level  Description  Interventions:  - Encourage patient to monitor pain and request assistance  - Assess pain using appropriate pain scale  - Administer analgesics based on type and severity of pain and evaluate response  - Implement non-pharmacological measures as appropriate and evaluate response  - Consider cultural and social influences on pain and pain management  - Notify physician/advanced practitioner if interventions unsuccessful or patient reports new pain  Outcome: Progressing     Problem: SAFETY ADULT  Goal: Patient will remain free of falls  Description  INTERVENTIONS:  - Assess patient frequently for physical needs  -  Identify cognitive and physical deficits and behaviors that affect risk of falls  -  [place] fall precautions as indicated by assessment   - Educate patient/family on patient safety including physical limitations  - Instruct patient to call for assistance with activity based on assessment  - Modify environment to reduce risk of injury  - Consider OT/PT consult to assist with strengthening/mobility  Outcome: Progressing     Problem: RESPIRATORY - ADULT  Goal: Achieves optimal ventilation and oxygenation  Description  INTERVENTIONS:  - Assess for changes in respiratory status  - Assess for changes in mentation and behavior  - Position to facilitate oxygenation and minimize respiratory effort  - Oxygen administration by appropriate delivery method based on oxygen saturation (per order) or ABGs  - Initiate smoking cessation education as indicated  - Encourage broncho-pulmonary hygiene including cough, deep breathe, Incentive Spirometry  - Assess the need for suctioning and aspirate as needed  - Assess and instruct to report SOB or any respiratory difficulty  - Respiratory Therapy support as indicated  Outcome: Progressing     Problem: Nutrition/Hydration-ADULT  Goal: Nutrient/Hydration intake appropriate for improving, restoring or maintaining nutritional needs  Description  Monitor and assess patient's nutrition/hydration status for malnutrition  Collaborate with interdisciplinary team and initiate plan and interventions as ordered  Monitor patient's weight and dietary intake as ordered or per policy  Utilize nutrition screening tool and intervene as necessary  Determine patient's food preferences and provide high-protein, high-caloric foods as appropriate       INTERVENTIONS:  - Monitor oral intake, urinary output, labs, and treatment plans  - Assess nutrition and hydration status and recommend course of action  - Evaluate amount of meals eaten  - Assist patient with eating if necessary   - Allow adequate time for meals  - Recommend/ encourage appropriate diets, oral nutritional supplements, and vitamin/mineral supplements  - Order, calculate, and assess calorie counts as needed  - Recommend, monitor, and adjust tube feedings and TPN/PPN based on assessed needs  - Assess need for intravenous fluids  - Provide specific nutrition/hydration education as appropriate  - Include patient/family/caregiver in decisions related to nutrition  Outcome: Progressing     Problem: Alteration in Thoughts and Perception  Goal: Attend and participate in unit activities, including therapeutic, recreational, and educational groups  Description  Interventions:  - Provide therapeutic and educational activities daily, encourage attendance and participation, and document same in the medical record     CERTIFIED PEER SPECIALIST INTERVENTIONS:    Complete peer assessment with patient to assess their needs and identify their goals to complete while in the recovery program as well as once discharged into the community  Patient will complete WRAP Plan, Crisis Plan and 5 Life Domains  Patient will attend 50% of groups offered on the unit  Patient will complete a goal card weekly      Outcome: Not Progressing  Goal: Recognize dysfunctional thoughts, communicate reality-based thoughts at the time of discharge  Description  Interventions:  - Provide medication and psycho-education to assist patient in compliance and developing insight into his/her illness   Outcome: Not Progressing  Goal: Complete daily ADLs, including personal hygiene independently, as able  Description  Interventions:  - Observe, teach, and assist patient with ADLS  - Monitor and promote a balance of rest/activity, with adequate nutrition and elimination   Outcome: Not Progressing     Problem: Ineffective Coping  Goal: Demonstrates healthy coping skills  Outcome: Not Progressing  Goal: Participates in unit activities  Description  Interventions:  - Provide therapeutic environment   - Provide required programming   - Redirect inappropriate behaviors   Outcome: Not Progressing     Problem: Risk for Self Injury/Neglect  Goal: Attend and participate in unit activities, including therapeutic, recreational, and educational groups  Description  Interventions:  - Provide therapeutic and educational activities daily, encourage attendance and participation, and document same in the medical record  - Obtain collateral information, encourage visitation and family involvement in care   Outcome: Not Progressing  Goal: Recognize maladaptive responses and adopt new coping mechanisms  Outcome: Not Progressing  Goal: Complete daily ADLs, including personal hygiene independently, as able  Description  Interventions:  - Observe, teach, and assist patient with ADLS  - Monitor and promote a balance of rest/activity, with adequate nutrition and elimination  Outcome: Not Progressing     Problem: Depression  Goal: Treatment Goal: Demonstrate behavioral control of depressive symptoms, verbalize feelings of improved mood/affect, and adopt new coping skills prior to discharge  Outcome: Not Progressing  Goal: Refrain from isolation  Description  Interventions:  - Develop a trusting relationship   - Encourage socialization   Outcome: Not Progressing  Goal: Refrain from self-neglect  Outcome: Not Progressing     Problem: Alteration in Orientation  Goal: Cooperate with recommended testing/procedures  Description  Interventions:  - Determine need for ancillary testing  - Observe for mental status changes  - Implement falls/precaution protocol   Outcome: Not Progressing   The patient was mostly isolative to her bed in her room today, coming out only for meds and meals with prompting  No groups or unit activities attended despite staff prompting and encouragement  Ate 75% of both meals   Complained of burning in chest after eating r/t her hiatal hernia and was advised to remain sitting upright rather than going back to bed immediately after eating  She did comply and had no further complaints after  Appears disheveled and still has not showered or changed her clothes since the 26th despite much encouragement from staff  No other behaviors or issues noted  Continue to monitor

## 2019-10-30 NOTE — PROGRESS NOTES
Sleeping at this time, O2 on via NC at 1L/min   No s/s of distress noted, all precautions maintained, monitoring continues

## 2019-10-31 PROCEDURE — 99231 SBSQ HOSP IP/OBS SF/LOW 25: CPT | Performed by: PSYCHIATRY & NEUROLOGY

## 2019-10-31 RX ADMIN — MONTELUKAST SODIUM 10 MG: 10 TABLET, COATED ORAL at 21:13

## 2019-10-31 RX ADMIN — CLOZAPINE 50 MG: 25 TABLET ORAL at 20:42

## 2019-10-31 RX ADMIN — PANTOPRAZOLE SODIUM 40 MG: 40 TABLET, DELAYED RELEASE ORAL at 05:31

## 2019-10-31 RX ADMIN — FLUTICASONE FUROATE AND VILANTEROL TRIFENATATE 1 PUFF: 200; 25 POWDER RESPIRATORY (INHALATION) at 09:10

## 2019-10-31 RX ADMIN — LEVOTHYROXINE SODIUM 125 MCG: 125 TABLET ORAL at 05:31

## 2019-10-31 RX ADMIN — FLUOXETINE 30 MG: 20 CAPSULE ORAL at 09:12

## 2019-10-31 RX ADMIN — THEOPHYLLINE ANHYDROUS 200 MG: 200 CAPSULE, EXTENDED RELEASE ORAL at 09:12

## 2019-10-31 RX ADMIN — CLOZAPINE 50 MG: 25 TABLET ORAL at 17:00

## 2019-10-31 RX ADMIN — CLOZAPINE 25 MG: 25 TABLET ORAL at 20:43

## 2019-10-31 RX ADMIN — CLOZAPINE 25 MG: 25 TABLET ORAL at 17:00

## 2019-10-31 RX ADMIN — TIOTROPIUM BROMIDE 18 MCG: 18 CAPSULE ORAL; RESPIRATORY (INHALATION) at 09:10

## 2019-10-31 NOTE — PROGRESS NOTES
10/31/19 0900   Team Meeting   Meeting Type Daily Rounds   Team Members Present   Team Members Present Physician;Nurse;; Other (Discipline and Name)   Physician Team Member Dr Melody Elizalde Team Member Morgan Gonsalves RN   Care Management Team Member Brenda Mai   Other (Discipline and Name) Omar [place]; Soren Rather, [facility]     Patient still isolative to her room  Less somatic yesterday  Not attending groups  Not caring for hygiene  Slept

## 2019-10-31 NOTE — PLAN OF CARE
Problem: Alteration in Thoughts and Perception  Goal: Agree to be compliant with medication regime, as prescribed and report medication side effects  Description  Interventions:  - Offer appropriate PRN medication and supervise ingestion; conduct aims, as needed   Outcome: Progressing     Problem: Alteration in Thoughts and Perception  Goal: Recognize dysfunctional thoughts, communicate reality-based thoughts at the time of discharge  Description  Interventions:  - Provide medication and psycho-education to assist patient in compliance and developing insight into his/her illness   Outcome: Not Progressing  Goal: Complete daily ADLs, including personal hygiene independently, as able  Description  Interventions:  - Observe, teach, and assist patient with ADLS  - Monitor and promote a balance of rest/activity, with adequate nutrition and elimination   Outcome: Not Progressing   Patient remains isolative only visible during medication and meal times and then retreats to her room  Lays in bed all day, refusing to get OOB for groups  Patient encouraged multiple times to shower as this is part of her behavioral plan to shower on odd number days  She keeps refusing and coming up with somatic excuses why she cannot shower  Continue to monitor

## 2019-10-31 NOTE — PLAN OF CARE
Problem: Alteration in Thoughts and Perception  Goal: Agree to be compliant with medication regime, as prescribed and report medication side effects  Description  Interventions:  - Offer appropriate PRN medication and supervise ingestion; conduct aims, as needed   Outcome: Progressing     Problem: Risk for Self Injury/Neglect  Goal: Verbalize thoughts and feelings  Description  Interventions:  - Assess and re-assess patient's lethality and potential for self-injury  - Engage patient in 1:1 interactions, daily, for a minimum of 15 minutes  - Encourage patient to express feelings, fears, frustrations, hopes  - Establish rapport/trust with patient   Outcome: Progressing     Problem: SAFETY ADULT  Goal: Patient will remain free of falls  Description  INTERVENTIONS:  - Assess patient frequently for physical needs  -  Identify cognitive and physical deficits and behaviors that affect risk of falls    -  [place] fall precautions as indicated by assessment   - Educate patient/family on patient safety including physical limitations  - Instruct patient to call for assistance with activity based on assessment  - Modify environment to reduce risk of injury  - Consider OT/PT consult to assist with strengthening/mobility  Outcome: Progressing     Problem: RESPIRATORY - ADULT  Goal: Achieves optimal ventilation and oxygenation  Description  INTERVENTIONS:  - Assess for changes in respiratory status  - Assess for changes in mentation and behavior  - Position to facilitate oxygenation and minimize respiratory effort  - Oxygen administration by appropriate delivery method based on oxygen saturation (per order) or ABGs  - Initiate smoking cessation education as indicated  - Encourage broncho-pulmonary hygiene including cough, deep breathe, Incentive Spirometry  - Assess the need for suctioning and aspirate as needed  - Assess and instruct to report SOB or any respiratory difficulty  - Respiratory Therapy support as indicated  Outcome: Progressing     Problem: Alteration in Thoughts and Perception  Goal: Attend and participate in unit activities, including therapeutic, recreational, and educational groups  Description  Interventions:  - Provide therapeutic and educational activities daily, encourage attendance and participation, and document same in the medical record     CERTIFIED PEER SPECIALIST INTERVENTIONS:    Complete peer assessment with patient to assess their needs and identify their goals to complete while in the recovery program as well as once discharged into the community  Patient will complete WRAP Plan, Crisis Plan and 5 Life Domains  Patient will attend 50% of groups offered on the unit  Patient will complete a goal card weekly  Outcome: Not Progressing  Goal: Complete daily ADLs, including personal hygiene independently, as able  Description  Interventions:  - Observe, teach, and assist patient with ADLS  - Monitor and promote a balance of rest/activity, with adequate nutrition and elimination   Outcome: Not Progressing     Problem: Depression  Goal: Refrain from isolation  Description  Interventions:  - Develop a trusting relationship   - Encourage socialization   Outcome: Not Ileana Abt has been isolative to her room most of the shift  No somatic complaints  Social with staff and peers when out for medication and meal  Needs prompting for pills  Swallows them with water without distress  Ate 50% of her meal  No BM or shower this shift  Did not attend evening group  Ate snack then took HS medication without distress  Did not do incentive spirometer  No respiratory distress  Oxygen saturation  94% prior to oxygen 1 liter via nasal cannula applied  Continue to monitor  Precautions maintained

## 2019-10-31 NOTE — PLAN OF CARE
Problem: PAIN - ADULT  Goal: Verbalizes/displays adequate comfort level or baseline comfort level  Description  Interventions:  - Encourage patient to monitor pain and request assistance  - Assess pain using appropriate pain scale  - Administer analgesics based on type and severity of pain and evaluate response  - Implement non-pharmacological measures as appropriate and evaluate response  - Consider cultural and social influences on pain and pain management  - Notify physician/advanced practitioner if interventions unsuccessful or patient reports new pain  Outcome: Progressing     Problem: SAFETY ADULT  Goal: Patient will remain free of falls  Description  INTERVENTIONS:  - Assess patient frequently for physical needs  -  Identify cognitive and physical deficits and behaviors that affect risk of falls    -  [place] fall precautions as indicated by assessment   - Educate patient/family on patient safety including physical limitations  - Instruct patient to call for assistance with activity based on assessment  - Modify environment to reduce risk of injury  - Consider OT/PT consult to assist with strengthening/mobility  Outcome: Progressing     Problem: RESPIRATORY - ADULT  Goal: Achieves optimal ventilation and oxygenation  Description  INTERVENTIONS:  - Assess for changes in respiratory status  - Assess for changes in mentation and behavior  - Position to facilitate oxygenation and minimize respiratory effort  - Oxygen administration by appropriate delivery method based on oxygen saturation (per order) or ABGs  - Initiate smoking cessation education as indicated  - Encourage broncho-pulmonary hygiene including cough, deep breathe, Incentive Spirometry  - Assess the need for suctioning and aspirate as needed  - Assess and instruct to report SOB or any respiratory difficulty  - Respiratory Therapy support as indicated  Outcome: Claudell Clapper maintained on ongoing SAFE precaution without incident on this shift  Laying in bed with eyes closed, breath even and unlabored  Q 15 minutes rounding continues  No indication of pain or discomfort noted  No respiratory distress noted  On 1L O2 with humidifier via NC while asleep  No behavioral noted  Will continue to monitor

## 2019-10-31 NOTE — ASSESSMENT & PLAN NOTE
Psychiatry Progress Note    Patient remains passive-aggressive and does not attend groups or take showers despite promising otherwise and continues to have psychosomatic symptoms with the fear that she is going to die here with low blood sugar, breathing problem, swallowing difficulty, and believes that she has cancer  She comes up with multiple excuses for her not taking showers  She is still  suspicious about certain staff who gives her medications like her inhalers and the spirometer off and on   She thinks that we are withholding some information from her as she believes she has some terrible illness and this has not changed  She has not been attending any groups in several days now  She was again reminded that  if she maintains her 25% group attendance and attends to her ADLs skills,  we can always look into discharging her back to the [place] personal care [facility] again  She does not admit to any overt hallucinations or paranoia but still appears to harbor some covert  paranoia based on her demeanor and interaction on the unit   She again verbalizes her suspicion that that there is a micro chip on her right forearm and that it is not a fatty tumor and has accused her therapist here of trying to steal a medical doctor( Dr Samantha Hunter, whom no one knows from here) whom she believes that she is in love with  No current suicidal homicidal thoughts intent or plans reported  No overt hallucinations or other delusions reported   No signs or sxs of agranulocytosis or myocarditis or endocarditis and she is moving her bowels so far with no issues since the recent increase in clozapine  Patient was again reminded to attend to ADLs skills and take showers at least every other day and attend more groups to keep up with her 25% expectation         Current medications:    Current Facility-Administered Medications:     acetaminophen (TYLENOL) tablet 650 mg, 650 mg, Oral, Q6H PRN, Meldon Curling, MD    albuterol (PROVENTIL HFA,VENTOLIN HFA) inhaler 2 puff, 2 puff, Inhalation, Q4H PRN, Tree Madden MD, 2 puff at 10/11/19 0424    aluminum-magnesium hydroxide-simethicone (MYLANTA) 200-200-20 mg/5 mL oral suspension 15 mL, 15 mL, Oral, Q4H PRN, Tree Madden MD    ammonium lactate (LAC-HYDRIN) 12 % lotion 1 application, 1 application, Topical, BID PRN, Tree Madden MD    benzonatate (TESSALON PERLES) capsule 100 mg, 100 mg, Oral, TID PRN, Tree Madden MD    benztropine (COGENTIN) injection 1 mg, 1 mg, Intramuscular, Q8H PRN, Tree Madden MD    cloZAPine (CLOZARIL) tablet 25 mg, 25 mg, Oral, BID, Tree Madden MD, 25 mg at 10/30/19 2118    cloZAPine (CLOZARIL) tablet 50 mg, 50 mg, Oral, BID, Tree Madden MD, 50 mg at 10/30/19 2119    EPINEPHrine PF (ADRENALIN) 1 mg/mL injection 0 15 mg, 0 15 mg, Intramuscular, Once PRN, Tree Madden MD    FLUoxetine (PROzac) capsule 30 mg, 30 mg, Oral, Daily, Tree Madden MD, 30 mg at 10/31/19 0912    fluticasone-vilanterol (BREO ELLIPTA) 200-25 MCG/INH inhaler 1 puff, 1 puff, Inhalation, Daily, Tree Madden MD, 1 puff at 10/31/19 0910    ketotifen (ZADITOR) 0 025 % ophthalmic solution 1 drop, 1 drop, Right Eye, BID PRN, Tree Madden MD    levothyroxine tablet 125 mcg, 125 mcg, Oral, Early Morning, Tree Madden MD, 125 mcg at 10/31/19 0531    magnesium hydroxide (MILK OF MAGNESIA) 400 mg/5 mL oral suspension 30 mL, 30 mL, Oral, Daily PRN, Tree Madden MD    montelukast (SINGULAIR) tablet 10 mg, 10 mg, Oral, HS, Tree Madden MD, 10 mg at 10/30/19 2118    OLANZapine (ZyPREXA) IM injection 5 mg, 5 mg, Intramuscular, Q8H PRN, Tree Madden MD    OLANZapine (ZyPREXA) tablet 5 mg, 5 mg, Oral, Q8H PRN, Tree Madden MD    ondansetron (ZOFRAN-ODT) dispersible tablet 4 mg, 4 mg, Oral, Q6H PRN, Tree Madden MD    pantoprazole (PROTONIX) EC tablet 40 mg, 40 mg, Oral, Early Morning, Tree Madden MD, 40 mg at 10/31/19 0531    polyethylene glycol (MIRALAX) packet 17 g, 17 g, Oral, Daily PRN, Jill Gayle MD    polyvinyl alcohol (LIQUIFILM TEARS) 1 4 % ophthalmic solution 1 drop, 1 drop, Both Eyes, Q3H PRN, Jill Gayle MD    theophylline (JEF-24) 24 hr capsule 200 mg, 200 mg, Oral, Daily, Jill Gayle MD, 200 mg at 10/31/19 0912    tiotropium [facility]) capsule for inhaler 18 mcg, 18 mcg, Inhalation, Daily, Jill Gayle MD, 18 mcg at 10/31/19 0910    traZODone (DESYREL) tablet 25 mg, 25 mg, Oral, HS PRN, Jill Gayle MD  Justification if on more than two antipsychotics:  Only on his clozapine  Side effects if any:  None    Risks , benefits, side effects and precautions of medications discussed with patient and reminded patient to let us know any problems with medications     Objective:     Vital Signs:  Vitals:    10/30/19 1700 10/30/19 1930 10/30/19 2135 10/31/19 0730   BP: 98/70 95/62  112/72   BP Location: Left arm Left arm  Left arm   Pulse: 94 101 82 86   Resp: 16 15  18   Temp: 98 3 °F (36 8 °C) 98 4 °F (36 9 °C)  97 5 °F (36 4 °C)   TempSrc: Temporal Temporal  Temporal   SpO2: 94% 93% 94%    Weight:       Height:         Appearance:  age appropriate, casually dressed and overweight older than stated age   Behavior:  deviant, evasive and guarded and suspicious but with good eye contact   Speech:  normal pitch and normal volume but tends to become loud at times   Mood:  anxious and dysthymic   Affect:  mood-congruent   Thought Process:  goal directed and illogical slightly pressured but able to be redirected and talks as if in a court of low being stilted   Thought Content:  delusions  grandiose and somatic about not being able to breathe despite being on nasal oxygen and paranoid about people out to harm her and Atrovent inhaler tainteded with peanut oil  No current suicidal homicidal thoughts intent or plans reported    No phobias obsessions or compulsions reported   Perceptual Disturbances: None and does not appear responding to internal stimuli   Risk Potential: Tendency to not care for herself if she goes off treatment   Sensorium:  person, place, time/date, situation, day of week, month of year and time   Cognition:  grossly intact   Consciousness:  alert and awake    Attention: Intact concentration and attention span   Intellect: Considered to be at least of average intelligence   Insight:  limited in denial   Judgment: poor      Motor Activity: no abnormal movements         Recent Labs:  Results Reviewed     None          I/O Past 24 hours:  No intake/output data recorded  No intake/output data recorded  Assessment / Plan:     [facility]ffect[facility]      Reason for continued inpatient care:  Because of underlying paranoia and refusal to go back to the personal care home and inability to care for herself on her own  Acceptance by patient:  Accepting  Colette Castrejon in recovery:  About living in another personal care home once she feels better  Understanding of medications :  Has some understanding  Involved in reintegration process:  Adjusting to the unit  Trusting in relatoinship with psychiatrist:  Trusting    Recommended Treatment:    Medication changes:  1) increase clozapine as 75 mg po bid at 4 pm and hs for total of 150 mg a day for increasing paranoia   Non-pharmacological treatments  1) continue with  individual therapy group therapy, milieu therapy and occupational therapy and milieu therapy involving multidisciplinary team approach with psychotherapist, case management, nursing, peer support services, art therapist etc using recovery principles and psycho-education about accepting illness and need for treatment   3) encourage to attend to ADLs like taking showers and wearing clean clothes   4) Encourage to attend groups   Safety  1) Safety/communication plan established targeting dynamic risk factors above    Discharge Plan most likely back to the a:  Personal care boarding home with act services once her delusions are managed and mood becomes more stabilized and she becomes more receptive to treatment compliance    Counseling / Coordination of Care    Total floor / unit time spent today 15 minutes  Greater than 50% of total time was spent with the patient and / or family counseling and / or coordination of care  A description of the counseling / coordination of care  Patient's Rights, confidentiality and exceptions to confidentiality, use of automated medical record, [facility] staff access to medical record, and consent to treatment reviewed      Jeffrey Gallegos MD

## 2019-10-31 NOTE — PROGRESS NOTES
Progress Note - Jose Ramon Roblero 1955, 60 y o  female MRN: [id number]    [facility] Encounter: [id number]    Primary Care Provider: Richar Bass PA-C   Date and time admitted to hospital: 7/23/2019  5:30 PM        * Schizoaffective disorder, bipolar type [facility])  Assessment & Plan  Psychiatry Progress Note    Patient remains passive-aggressive and does not attend groups or take showers despite promising otherwise and continues to have psychosomatic symptoms with the fear that she is going to die here with low blood sugar, breathing problem, swallowing difficulty, and believes that she has cancer  She comes up with multiple excuses for her not taking showers  She is still  suspicious about certain staff who gives her medications like her inhalers and the spirometer off and on   She thinks that we are withholding some information from her as she believes she has some terrible illness and this has not changed  She has not been attending any groups in several days now  She was again reminded that  if she maintains her 25% group attendance and attends to her ADLs skills,  we can always look into discharging her [facility] [place] [facility]  She does not admit to any overt hallucinations or paranoia but still appears to harbor some covert  paranoia based on her demeanor and interaction on the unit   She again verbalizes her suspicion that that there is a micro chip on her right forearm and that it is not a fatty tumor and has accused her therapist here of trying to steal a medical doctor( Dr Mingo Hughes, whom no one knows from here) whom she believes that she is in love with  No current suicidal homicidal thoughts intent or plans reported  No overt hallucinations or other delusions reported   No signs or sxs of agranulocytosis or myocarditis or endocarditis and she is moving her bowels so far with no issues since the recent increase in clozapine    Patient was again reminded to attend to ADLs skills and take showers at least every other day and attend more groups to keep up with her 25% expectation         Current medications:    Current Facility-Administered Medications:     acetaminophen (TYLENOL) tablet 650 mg, 650 mg, Oral, Q6H PRN, Shaggy Rangel MD    albuterol (PROVENTIL HFA,VENTOLIN HFA) inhaler 2 puff, 2 puff, Inhalation, Q4H PRN, Shaggy Rangel MD, 2 puff at 10/11/19 0424    aluminum-magnesium hydroxide-simethicone (MYLANTA) 200-200-20 mg/5 mL oral suspension 15 mL, 15 mL, Oral, Q4H PRN, Shaggy Rangel MD    ammonium lactate (LAC-HYDRIN) 12 % lotion 1 application, 1 application, Topical, BID PRN, Shaggy Rangel MD    benzonatate (TESSALON PERLES) capsule 100 mg, 100 mg, Oral, TID PRN, Shaggy Rangel MD    benztropine (COGENTIN) injection 1 mg, 1 mg, Intramuscular, Q8H PRN, Shaggy Rangel MD    cloZAPine (CLOZARIL) tablet 25 mg, 25 mg, Oral, BID, Shaggy Rangel MD, 25 mg at 10/30/19 2118    cloZAPine (CLOZARIL) tablet 50 mg, 50 mg, Oral, BID, Shaggy Rangel MD, 50 mg at 10/30/19 2119    EPINEPHrine PF (ADRENALIN) 1 mg/mL injection 0 15 mg, 0 15 mg, Intramuscular, Once PRN, Shaggy Rangel MD    FLUoxetine (PROzac) capsule 30 mg, 30 mg, Oral, Daily, Shaggy Rangel MD, 30 mg at 10/31/19 0912    fluticasone-vilanterol (BREO ELLIPTA) 200-25 MCG/INH inhaler 1 puff, 1 puff, Inhalation, Daily, Shaggy Rangel MD, 1 puff at 10/31/19 0910    ketotifen (ZADITOR) 0 025 % ophthalmic solution 1 drop, 1 drop, Right Eye, BID PRN, Shaggy Rangel MD    levothyroxine tablet 125 mcg, 125 mcg, Oral, Early Morning, Shaggy Rangel MD, 125 mcg at 10/31/19 0531    magnesium hydroxide (MILK OF MAGNESIA) 400 mg/5 mL oral suspension 30 mL, 30 mL, Oral, Daily PRN, Shaggy Rangel MD    montelukast (SINGULAIR) tablet 10 mg, 10 mg, Oral, HS, Shaggy Rangel MD, 10 mg at 10/30/19 2118    OLANZapine (ZyPREXA) IM injection 5 mg, 5 mg, Intramuscular, Q8H PRN, Shaggy Rangel MD    OLANZapine (ZyPREXA) tablet 5 mg, 5 mg, Oral, Q8H PRN, Sandhya Bolaños MD    ondansetron (ZOFRAN-ODT) dispersible tablet 4 mg, 4 mg, Oral, Q6H PRN, Sandhya Bolaños MD    pantoprazole (PROTONIX) EC tablet 40 mg, 40 mg, Oral, Early Morning, Sandhya Bolaños MD, 40 mg at 10/31/19 0531    polyethylene glycol (MIRALAX) packet 17 g, 17 g, Oral, Daily PRN, Sandhya Bolaños MD    polyvinyl alcohol (LIQUIFILM TEARS) 1 4 % ophthalmic solution 1 drop, 1 drop, Both Eyes, Q3H PRN, Sandhya Bolaños MD    theophylline (JEF-24) 24 hr capsule 200 mg, 200 mg, Oral, Daily, Sandhya Bolaños MD, 200 mg at 10/31/19 0912    tiotropium (SPIRIVA) capsule for inhaler 18 mcg, 18 mcg, Inhalation, Daily, Sandhya Bolaños MD, 18 mcg at 10/31/19 0910    traZODone (DESYREL) tablet 25 mg, 25 mg, Oral, HS PRN, Sandhya Bolaños MD  Justification if on more than two antipsychotics:  Only on his clozapine  Side effects if any:  None    Risks , benefits, side effects and precautions of medications discussed with patient and reminded patient to let us know any problems with medications     Objective:     Vital Signs:  Vitals:    10/30/19 1700 10/30/19 1930 10/30/19 2135 10/31/19 0730   BP: 98/70 95/62  112/72   BP Location: Left arm Left arm  Left arm   Pulse: 94 101 82 86   Resp: 16 15  18   Temp: 98 3 °F (36 8 °C) 98 4 °F (36 9 °C)  97 5 °F (36 4 °C)   TempSrc: Temporal Temporal  Temporal   SpO2: 94% 93% 94%    Weight:       Height:         Appearance:  age appropriate, casually dressed and overweight older than stated age   Behavior:  deviant, evasive and guarded and suspicious but with good eye contact   Speech:  normal pitch and normal volume but tends to become loud at times   Mood:  anxious and dysthymic   Affect:  mood-congruent   Thought Process:  goal directed and illogical slightly pressured but able to be redirected and talks as if in a court of low being stilted   Thought Content:  delusions  grandiose and somatic about not being able to breathe despite being on nasal oxygen and paranoid about people out to harm her and Atrovent inhaler tainteded with peanut oil  No current suicidal homicidal thoughts intent or plans reported  No phobias obsessions or compulsions reported   Perceptual Disturbances: None and does not appear responding to internal stimuli   Risk Potential: Tendency to not care for herself if she goes off treatment   Sensorium:  person, place, time/date, situation, day of week, month of year and time   Cognition:  grossly intact   Consciousness:  alert and awake    Attention: Intact concentration and attention span   Intellect: Considered to be at least of average intelligence   Insight:  limited in denial   Judgment: poor      Motor Activity: no abnormal movements         Recent Labs:  Results Reviewed     None          I/O Past 24 hours:  No intake/output data recorded  No intake/output data recorded  Assessment / Plan:     Sch[facility]oaffective disorder, [facility] ([facility]      Reason for continued inpatient care:  Because of underlying paranoia and refusal to go back to the personal care home and inability to care for herself on her own  Acceptance by patient:  Accepting  Audelia Arthur in recovery:  About living in another personal care home once she feels better  Understanding of medications :  Has some understanding  Involved in reintegration process:  Adjusting to the unit  Trusting in relatoinship with psychiatrist:  Trusting    Recommended Treatment:    Medication changes:  1) increase clozapine as 75 mg po bid at 4 pm and hs for total of 150 mg a day for increasing paranoia   Non-pharmacological treatments  1) continue with  individual therapy group therapy, milieu therapy and occupational therapy and milieu therapy involving multidisciplinary team approach with psychotherapist, case management, nursing, peer support services, art therapist etc using recovery principles and psycho-education about accepting illness and need for treatment     3) encourage to attend to ADLs like taking showers and wearing clean clothes   4) Encourage to attend groups   Safety  1) Safety/communication plan established targeting dynamic risk factors above  Discharge Plan most likely back to the a:  Personal care boarding home with act services once her delusions are managed and mood becomes more stabilized and she becomes more receptive to treatment compliance    Counseling / Coordination of Care    Total floor / unit time spent today 15 minutes  Greater than 50% of total time was spent with the patient and / or family counseling and / or coordination of care  A description of the counseling / coordination of care  Patient's Rights, confidentiality and exceptions to confidentiality, use of automated medical record, [facility] access to [facility] record, and consent to treatment reviewed      Isidoro Gonzalez MD

## 2019-11-01 LAB
BASOPHILS # BLD AUTO: 0 THOUSANDS/ΜL (ref 0–0.1)
BASOPHILS NFR BLD AUTO: 1 % (ref 0–1)
EOSINOPHIL # BLD AUTO: 0.2 THOUSAND/ΜL (ref 0–0.4)
EOSINOPHIL NFR BLD AUTO: 2 % (ref 0–6)
ERYTHROCYTE [DISTWIDTH] IN BLOOD BY AUTOMATED COUNT: 13.5 %
HCT VFR BLD AUTO: 40.5 % (ref 36–46)
HGB BLD-MCNC: 13.4 G/DL (ref 12–16)
LYMPHOCYTES # BLD AUTO: 2.6 THOUSANDS/ΜL (ref 0.5–4)
LYMPHOCYTES NFR BLD AUTO: 39 % (ref 25–45)
MCH RBC QN AUTO: 30.8 PG (ref 26–34)
MCHC RBC AUTO-ENTMCNC: 33.1 G/DL (ref 31–36)
MCV RBC AUTO: 93 FL (ref 80–100)
MONOCYTES # BLD AUTO: 0.7 THOUSAND/ΜL (ref 0.2–0.9)
MONOCYTES NFR BLD AUTO: 11 % (ref 1–10)
NEUTROPHILS # BLD AUTO: 3.2 THOUSANDS/ΜL (ref 1.8–7.8)
NEUTS SEG NFR BLD AUTO: 47 % (ref 45–65)
PLATELET # BLD AUTO: 213 THOUSANDS/UL (ref 150–450)
PMV BLD AUTO: 9.7 FL (ref 8.9–12.7)
RBC # BLD AUTO: 4.36 MILLION/UL (ref 4–5.2)
WBC # BLD AUTO: 6.8 THOUSAND/UL (ref 4.5–11)

## 2019-11-01 PROCEDURE — 99231 SBSQ HOSP IP/OBS SF/LOW 25: CPT | Performed by: PSYCHIATRY & NEUROLOGY

## 2019-11-01 PROCEDURE — 85025 COMPLETE CBC W/AUTO DIFF WBC: CPT | Performed by: PSYCHIATRY & NEUROLOGY

## 2019-11-01 RX ADMIN — CLOZAPINE 25 MG: 25 TABLET ORAL at 16:01

## 2019-11-01 RX ADMIN — CLOZAPINE 25 MG: 25 TABLET ORAL at 21:00

## 2019-11-01 RX ADMIN — THEOPHYLLINE ANHYDROUS 200 MG: 200 CAPSULE, EXTENDED RELEASE ORAL at 08:18

## 2019-11-01 RX ADMIN — CLOZAPINE 50 MG: 25 TABLET ORAL at 16:01

## 2019-11-01 RX ADMIN — FLUTICASONE FUROATE AND VILANTEROL TRIFENATATE 1 PUFF: 200; 25 POWDER RESPIRATORY (INHALATION) at 08:18

## 2019-11-01 RX ADMIN — TIOTROPIUM BROMIDE 18 MCG: 18 CAPSULE ORAL; RESPIRATORY (INHALATION) at 08:18

## 2019-11-01 RX ADMIN — CLOZAPINE 50 MG: 25 TABLET ORAL at 21:00

## 2019-11-01 RX ADMIN — PANTOPRAZOLE SODIUM 40 MG: 40 TABLET, DELAYED RELEASE ORAL at 05:49

## 2019-11-01 RX ADMIN — LEVOTHYROXINE SODIUM 125 MCG: 125 TABLET ORAL at 05:49

## 2019-11-01 RX ADMIN — FLUOXETINE 30 MG: 20 CAPSULE ORAL at 08:18

## 2019-11-01 RX ADMIN — MONTELUKAST SODIUM 10 MG: 10 TABLET, COATED ORAL at 21:19

## 2019-11-01 NOTE — PROGRESS NOTES
11/01/19 1100   Activity/Group Checklist   Group Other (Comment)  (IMR - Graduation/Weekly Goal Review)   Attendance Did not attend     Patient was encouraged to attend group, but did not attend group, stating that she did not feel well

## 2019-11-01 NOTE — PROGRESS NOTES
Pharmacy Clozapine Monitoring Progress Note     Roselia Woody is receiving a total daily dose of 150 mg of clozapine divided as 75mg at 1600 and 75mg at 2000  Pharmacist Recommendations Based on Assessment and Plan (below):    1  Patient is Clozapine-REMS eligible, ANC was updated, with weekly monitoring frequency and the next [address] is due on 11/8/2019       2  Recommend prophylactic bowel regimen  3  Recommend repeat ECG for myocarditis monitoring and QTC prolongation due to antipsychotic  Assessment/Plan:    Phase of Treatment:     Current phase of treatment is initation/titration  Patient Clozapine REMS Eligibility:     Patient is eligible to receive clozapine and the [address] monitoring frequency is weekly per clozapine REMS  The patient's latest [address] value has been updated into the Clozapine-REMS program          ANC and Clozapine Level (if available)     The most recent [address] was   Lab Results   Component Value Date    NEUTROABS 3 20 11/01/2019    and the next lab is due on 11/8/19  Last Clozapine level (if available):    0   Lab Value Date/Time    CLOZAPINE 324 (L) 08/16/2019 1131     0   Lab Value Date/Time    NCLOZIP 207 08/16/2019 1131           Monitoring Parameters for Clozapine:     Clozapine Adverse Effect Suggested Monitoring Patient's Current Status    Agranulocytosis ANC baseline and then repeat weekly for first 6 months and every 2 weeks for the second 6 months  Maintenance after one year of therapy every month  The most recent [address] was   Lab Results   Component Value Date    NEUTROABS 3 20 11/01/2019       This ANC is considered normal range (ANC >/= 1500/mcL)  Continue current treatment and monitoring course     Respiratory Depression Please ensure patient is not on concomitant respiratory lowering medications such as benzodiazepines, sedative hypnotics (eg  zolpidem) This patient is not on respiratory depression lowering medications   Myocarditis Baseline and weekly EKG, CRP, BNP, and troponin  Weekly symptomatic complaints of fatigue, dyspnea, tachycardia, chest pain, palpitations, and fever for the first 4 weeks  Last EKG Results on  8/21/19 showed:  T wave abnormalities    Last QTC value was:  0   Lab Value Date/Time    QTCINT 427 08/21/2019 1133       Last BNP was: No results found for: NTBNP    Last Troponin was:  0   Lab Value Date/Time    TROPONINI <0 01 05/01/2019 1318    TROPONINI <0 04 05/10/2015 1948        Orthostatic hypotension/  bradycardia Blood pressure and vital signs      Monitor blood pressure and orthostatic hypotension at least twice daily upon initiation and continue to follow at least once daily afterwards  Patient's last recorded vital signs:       /81 (BP Location: Left arm)   Pulse 94   Temp 98 1 °F (36 7 °C) (Temporal)   Resp 18   Ht 5' 4" (1 626 m)   Wt 67 4 kg (148 lb 9 6 oz)   SpO2 97%   BMI 25 51 kg/m²             Constipation (bowel obstruction due to high anticholinergic clozapine burden) Assess at baseline and weekly during first four months of therapy  Docusate 100mg BID and Miralax 17 grams daily recommended initially  Prophylactic bowel regimen not ordered and it is recommended to order a standing bowel regimen to reduce risk of bowel obstruction associated with clozapine therapy  Sialorrhea Assess at baseline and weekly during first four months of therapy  May be managed using sublingual anticholinergic preparations (atropine 1% eye drops)  Patient is not experiencing sialorrhea  This will continue to be monitored  Please note that per current REMS protocol the provider can submit a treatment rationale that justifies clozapine treatment even if patient parameters are outside of REMS recommendations  The Clozapine REMS is an FDA-mandated program implemented by the manufacturers of clozapine  ([employer]  com/CpmgClozapineUI/home  u)            Pharmacy will continue to follow patient with team, thank you    Electronically signed by: María Elena Gaona, PharmD, [address], Clinical Pharmacist - Psychiatry

## 2019-11-01 NOTE — PLAN OF CARE
Problem: Alteration in Thoughts and Perception  Goal: Agree to be compliant with medication regime, as prescribed and report medication side effects  Description  Interventions:  - Offer appropriate PRN medication and supervise ingestion; conduct aims, as needed   Outcome: Not Progressing  Goal: Attend and participate in unit activities, including therapeutic, recreational, and educational groups  Description  Interventions:  - Provide therapeutic and educational activities daily, encourage attendance and participation, and document same in the medical record     CERTIFIED PEER SPECIALIST INTERVENTIONS:    Complete peer assessment with patient to assess their needs and identify their goals to complete while in the recovery program as well as once discharged into the community  Patient will complete WRAP Plan, Crisis Plan and 5 Life Domains  Patient will attend 50% of groups offered on the unit  Patient will complete a goal card weekly      Outcome: Not Progressing  Goal: Complete daily ADLs, including personal hygiene independently, as able  Description  Interventions:  - Observe, teach, and assist patient with ADLS  - Monitor and promote a balance of rest/activity, with adequate nutrition and elimination   Outcome: Not Progressing     Problem: Ineffective Coping  Goal: Participates in unit activities  Description  Interventions:  - Provide therapeutic environment   - Provide required programming   - Redirect inappropriate behaviors   Outcome: Not Progressing     Problem: Risk for Self Injury/Neglect  Goal: Refrain from harming self  Description  Interventions:  - Monitor patient closely, per order  - Develop a trusting relationship  - Supervise medication ingestion, monitor effects and side effects   Outcome: Progressing  Goal: Complete daily ADLs, including personal hygiene independently, as able  Description  Interventions:  - Observe, teach, and assist patient with ADLS  - Monitor and promote a balance of rest/activity, with adequate nutrition and elimination  Outcome: Not Progressing     Problem: Alteration in Orientation  Goal: Interact with staff daily  Description  Interventions:  - Assess and re-assess patient's level of orientation  - Engage patient in 1 on 1 interactions, daily, for a minimum of 15 minutes   - Establish rapport/trust with patient   Outcome: Not Progressing     Problem: PAIN - ADULT  Goal: Verbalizes/displays adequate comfort level or baseline comfort level  Description  Interventions:  - Encourage patient to monitor pain and request assistance  - Assess pain using appropriate pain scale  - Administer analgesics based on type and severity of pain and evaluate response  - Implement non-pharmacological measures as appropriate and evaluate response  - Consider cultural and social influences on pain and pain management  - Notify physician/advanced practitioner if interventions unsuccessful or patient reports new pain  Outcome: Progressing     Problem: SAFETY ADULT  Goal: Patient will remain free of falls  Description  INTERVENTIONS:  - Assess patient frequently for physical needs  -  Identify cognitive and physical deficits and behaviors that affect risk of falls    -  [place] fall precautions as indicated by assessment   - Educate patient/family on patient safety including physical limitations  - Instruct patient to call for assistance with activity based on assessment  - Modify environment to reduce risk of injury  - Consider OT/PT consult to assist with strengthening/mobility  Outcome: Progressing     Problem: RESPIRATORY - ADULT  Goal: Achieves optimal ventilation and oxygenation  Description  INTERVENTIONS:  - Assess for changes in respiratory status  - Assess for changes in mentation and behavior  - Position to facilitate oxygenation and minimize respiratory effort  - Oxygen administration by appropriate delivery method based on oxygen saturation (per order) or ABGs  - Initiate smoking cessation education as indicated  - Encourage broncho-pulmonary hygiene including cough, deep breathe, Incentive Spirometry  - Assess the need for suctioning and aspirate as needed  - Assess and instruct to report SOB or any respiratory difficulty  - Respiratory Therapy support as indicated  Outcome: Pool Simon is awake and alert at the onset of the shift  Visible, pleasant upon approach  She did not part take in group this evening  Little interaction with her peers  Denies any depression or anxiety  No a/t/v hallucination noted  Maintained on ongoing SAFE precaution without incident on this shift  Prior to bed [email or web address]  1L O2 with humidifier via nasal cannula at bedtime  Will continue to monitor

## 2019-11-01 NOTE — ASSESSMENT & PLAN NOTE
Psychiatry Progress Note    Patient  continues to have psychosomatic symptoms with the fear that she is going to die here with low blood sugar, breathing problem, swallowing difficulty, and believes that she has cancer  She still has to take a shower since a week ago despite giving me assurances that she was going to take a shower every single day and has been also refusing to attend any groups and prefers to lay back on her bed  She comes up with multiple somatic excuses for her not taking showers  She is still  suspicious about certain staff who gives her medications like her inhalers and the spirometer off and on which has not changed either   She thinks that we are withholding some information from her as she believes she has some terrible illness like cancer   She does not admit to any overt hallucinations or paranoia but still appears to harbor some covert  paranoia based on her demeanor and interaction on the unit   She again verbalizes her suspicion that that there is a micro chip on her right forearm and that it is not a fatty tumor   She still accuses the therapist of stealing her lover who is supposedly a medical doctor  No current suicidal homicidal thoughts intent or plans reported  No overt hallucinations or other delusions reported   No signs or sxs of agranulocytosis or myocarditis or endocarditis and she is moving her bowels so far with no issues since the recent increase in clozapine  Patient was again reminded to attend to ADLs skills and take showers at least every other day and attend more groups to keep up with her 25% expectation and she is not even compliant with the behavior plan that was developed with the therapist to address this        Current medications:    Current Facility-Administered Medications:     acetaminophen (TYLENOL) tablet 650 mg, 650 mg, Oral, Q6H PRN, Teri Huggins MD    albuterol (PROVENTIL HFA,VENTOLIN HFA) inhaler 2 puff, 2 puff, Inhalation, Q4H PRN, Anne Marie Hunter Elsa Willis MD, 2 puff at 10/11/19 0424    aluminum-magnesium hydroxide-simethicone (MYLANTA) 200-200-20 mg/5 mL oral suspension 15 mL, 15 mL, Oral, Q4H PRN, Shaggy Rangel MD    ammonium lactate (LAC-HYDRIN) 12 % lotion 1 application, 1 application, Topical, BID PRN, Shaggy Rangel MD    benzonatate (TESSALON PERLES) capsule 100 mg, 100 mg, Oral, TID PRN, Shaggy Rangel MD    benztropine (COGENTIN) injection 1 mg, 1 mg, Intramuscular, Q8H PRN, Shaggy Rangel MD    cloZAPine (CLOZARIL) tablet 25 mg, 25 mg, Oral, BID, Shaggy Rangel MD, 25 mg at 10/31/19 2043    cloZAPine (CLOZARIL) tablet 50 mg, 50 mg, Oral, BID, Shaggy Rangel MD, 50 mg at 10/31/19 2042    EPINEPHrine PF (ADRENALIN) 1 mg/mL injection 0 15 mg, 0 15 mg, Intramuscular, Once PRN, Shaggy Rangel MD    FLUoxetine (PROzac) capsule 30 mg, 30 mg, Oral, Daily, Shaggy Rangel MD, 30 mg at 11/01/19 0818    fluticasone-vilanterol (BREO ELLIPTA) 200-25 MCG/INH inhaler 1 puff, 1 puff, Inhalation, Daily, Shaggy Rangel MD, 1 puff at 11/01/19 0818    ketotifen (ZADITOR) 0 025 % ophthalmic solution 1 drop, 1 drop, Right Eye, BID PRN, Shaggy Rangel MD    levothyroxine tablet 125 mcg, 125 mcg, Oral, Early Morning, Shaggy Rangel MD, 125 mcg at 11/01/19 0549    magnesium hydroxide (MILK OF MAGNESIA) 400 mg/5 mL oral suspension 30 mL, 30 mL, Oral, Daily PRN, Shaggy Rangel MD    montelukast (SINGULAIR) tablet 10 mg, 10 mg, Oral, HS, Shaggy Rangel MD, 10 mg at 10/31/19 2113    OLANZapine (ZyPREXA) IM injection 5 mg, 5 mg, Intramuscular, Q8H PRN, Shaggy Rangel MD    OLANZapine (ZyPREXA) tablet 5 mg, 5 mg, Oral, Q8H PRN, Shaggy Rangel MD    ondansetron (ZOFRAN-ODT) dispersible tablet 4 mg, 4 mg, Oral, Q6H PRN, Shaggy Rangel MD    pantoprazole (PROTONIX) EC tablet 40 mg, 40 mg, Oral, Early Morning, Shaggy Rangel MD, 40 mg at 11/01/19 0549    polyethylene glycol (MIRALAX) packet 17 g, 17 g, Oral, Daily PRN, Shaggy Rangel MD    polyvinyl alcohol (LIQUIFILM TEARS) 1 4 % ophthalmic solution 1 drop, 1 drop, Both Eyes, Q3H PRN, Meredith Wesley MD    theophylline (JEF-24) 24 hr capsule 200 mg, 200 mg, Oral, Daily, Meredith Wesley MD, 200 mg at 11/01/19 0818    tiotropium (SPIRIVA) capsule for inhaler 18 mcg, 18 mcg, Inhalation, Daily, Meredith Wesley MD, 18 mcg at 11/01/19 0818    traZODone (DESYREL) tablet 25 mg, 25 mg, Oral, HS PRN, Meredith Wesley MD  Justification if on more than two antipsychotics:  Only on his clozapine  Side effects if any:  None    Risks , benefits, side effects and precautions of medications discussed with patient and reminded patient to let us know any problems with medications     Objective:     Vital Signs:  Vitals:    10/31/19 2010 10/31/19 2150 11/01/19 0730 11/01/19 0732   BP: (!) 82/48  128/72 124/81   BP Location: Left arm  Left arm Left arm   Pulse: 96  93 94   Resp: 18  18    Temp: (!) 97 3 °F (36 3 °C)  98 1 °F (36 7 °C)    TempSrc: Temporal  Temporal    SpO2: 98% 97%     Weight:       Height:         Appearance:  age appropriate, casually dressed and overweight older than stated age poorly groomed disheveled unkempt with a body odor   Behavior:  deviant, evasive and guarded and suspicious but with good eye contact and preoccupied   Speech:  normal pitch and normal volume but tends to become loud at times and circumstantial   Mood:  anxious and dysthymic   Affect:  mood-congruent   Thought Process:  goal directed and illogical slightly pressured but able to be redirected and tangential and talks as if in a court of low being stilted which has not changed   Thought Content:  delusions  grandiose and somatic type about not being able to breathe despite being on nasal oxygen and paranoid about people out to harm her and Atrovent inhaler tainteded with peanut oil  No current suicidal homicidal thoughts intent or plans reported  No phobias obsessions or compulsions reported    Somatic delusions about having cancer or terrible illnesses or having a micro chip on her hand and the therapist stealing her lover   Perceptual Disturbances: None and does not appear responding to internal stimuli   Risk Potential: Tendency to not care for herself if she goes off treatment   Sensorium:  person, place, time/date, situation, day of week, month of year and time   Cognition:  grossly intact   Consciousness:  alert and awake    Attention: Intact concentration and attention span   Intellect: Considered to be at least of average intelligence   Insight:  limited in denial   Judgment: poor      Motor Activity: no abnormal movements         Recent Labs:  Results Reviewed     None          I/O Past 24 hours:  No intake/output data recorded  No intake/output data recorded  Assessment / Plan:     Schizoaffective disorder, [facility]      Reason for continued inpatient care:  Because of underlying paranoia and refusal to go back to the personal care home and inability to care for herself on her own  Acceptance by patient:  Accepting  Pj Saldaña in recovery:  About living in another personal care home once she feels better  Understanding of medications :  Has some understanding  Involved in reintegration process:  Adjusting to the unit  Trusting in relatoinship with psychiatrist:  Trusting    Recommended Treatment:    Medication changes:  1) no changes today  Non-pharmacological treatments  1) continue with  individual therapy group therapy, milieu therapy and occupational therapy and milieu therapy involving multidisciplinary team approach with psychotherapist, case management, nursing, peer support services, art therapist etc using recovery principles and psycho-education about accepting illness and need for treatment   3) encourage to attend to ADLs like taking showers and wearing clean clothes   4) Encourage to attend groups   Safety  1) Safety/communication plan established targeting dynamic risk factors above    Discharge Plan most likely back to the a:  Personal care boarding home with act services once her delusions are managed and mood becomes more stabilized and she becomes more receptive to treatment compliance    Counseling / Coordination of Care    Total floor / unit time spent today 15 minutes  Greater than 50% of total time was spent with the patient and / or family counseling and / or coordination of care  A description of the counseling / coordination of care  Patient's Rights, confidentiality and exceptions to confidentiality, use of automated medical record, Jeb Patrick staff access to medical record, and consent to treatment reviewed      Meldon Curling, MD

## 2019-11-01 NOTE — PROGRESS NOTES
11/01/19 0900   Team Meeting   Meeting Type Daily Rounds   Team Members Present   Team Members Present Physician;Nurse;; Other (Discipline and Name)   Physician Team Member Dr Shannon Parson Team Member River Masters RN   Care Management Team Member Brenda Pagan   Other (Discipline and Name) DIANA Vázquez     Patient is focused on taking her ensure  Did eat 100% of dinner  Med compliant  Laying in bed all day  No hygiene  Slept

## 2019-11-01 NOTE — PLAN OF CARE
Problem: PAIN - ADULT  Goal: Verbalizes/displays adequate comfort level or baseline comfort level  Description  Interventions:  - Encourage patient to monitor pain and request assistance  - Assess pain using appropriate pain scale  - Administer analgesics based on type and severity of pain and evaluate response  - Implement non-pharmacological measures as appropriate and evaluate response  - Consider cultural and social influences on pain and pain management  - Notify physician/advanced practitioner if interventions unsuccessful or patient reports new pain  11/1/2019 0131 by Suresh Sylvester LPN  Outcome: Progressing  10/31/2019 2326 by Suresh Sylvester LPN  Outcome: Progressing     Problem: SAFETY ADULT  Goal: Patient will remain free of falls  Description  INTERVENTIONS:  - Assess patient frequently for physical needs  -  Identify cognitive and physical deficits and behaviors that affect risk of falls    -  [place] fall precautions as indicated by assessment   - Educate patient/family on patient safety including physical limitations  - Instruct patient to call for assistance with activity based on assessment  - Modify environment to reduce risk of injury  - Consider OT/PT consult to assist with strengthening/mobility  11/1/2019 0131 by Suresh Sylvester LPN  Outcome: Progressing  10/31/2019 2326 by Suresh Sylvester LPN  Outcome: Progressing     Problem: RESPIRATORY - ADULT  Goal: Achieves optimal ventilation and oxygenation  Description  INTERVENTIONS:  - Assess for changes in respiratory status  - Assess for changes in mentation and behavior  - Position to facilitate oxygenation and minimize respiratory effort  - Oxygen administration by appropriate delivery method based on oxygen saturation (per order) or ABGs  - Initiate smoking cessation education as indicated  - Encourage broncho-pulmonary hygiene including cough, deep breathe, Incentive Spirometry  - Assess the need for suctioning and aspirate as needed  - Assess and instruct to report SOB or any respiratory difficulty  - Respiratory Therapy support as indicated  Outcome: Quita Encarnacion maintained on ongoing SAFE precaution without incident on this shift   Laying in bed with eyes closed, breath even and unlabored   Q 15 minutes rounding continues   No indication of pain or discomfort noted   No respiratory distress noted   On 1L O2 with humidifier via NC while asleep  No behavioral noted   Will continue to monitor

## 2019-11-01 NOTE — PLAN OF CARE
Problem: Alteration in Thoughts and Perception  Goal: Agree to be compliant with medication regime, as prescribed and report medication side effects  Description  Interventions:  - Offer appropriate PRN medication and supervise ingestion; conduct aims, as needed   Outcome: Progressing  Goal: Attend and participate in unit activities, including therapeutic, recreational, and educational groups  Description  Interventions:  - Provide therapeutic and educational activities daily, encourage attendance and participation, and document same in the medical record     CERTIFIED PEER SPECIALIST INTERVENTIONS:    Complete peer assessment with patient to assess their needs and identify their goals to complete while in the recovery program as well as once discharged into the community  Patient will complete WRAP Plan, Crisis Plan and 5 Life Domains  Patient will attend 50% of groups offered on the unit  Patient will complete a goal card weekly  Outcome: Progressing  Goal: Complete daily ADLs, including personal hygiene independently, as able  Description  Interventions:  - Observe, teach, and assist patient with ADLS  - Monitor and promote a balance of rest/activity, with adequate nutrition and elimination   Outcome: Progressing   Patient remains isolative only visible during medication and meal times and then retreats to her room  Lays in bed all day, refusing to get OOB for groups  Staff continues to encourage the patient to shower  However, she keeps refusing and coming up with somatic excuses why she cannot shower, today she was stating that her BP is too low  Her BP this morning was 121/84  Continue to monitor

## 2019-11-01 NOTE — PROGRESS NOTES
Progress Note - Nina Bello 1955, 60 y o  female MRN: [id number]    [facility] Encounter: [id number]    Primary Care Provider: Kendrick Schultz PA-C   Date and time admitted to hospital: 7/23/2019  5:30 PM        * Schizoaffective disorder, bipolar type [facility])  Assessment & Plan  Psychiatry Progress Note    Patient  continues to have psychosomatic symptoms with the fear that she is going to die here with low blood sugar, breathing problem, swallowing difficulty, and believes that she has cancer  She still has to take a shower since a week ago despite giving me assurances that she was going to take a shower every single day and has been also refusing to attend any groups and prefers to lay back on her bed  She comes up with multiple somatic excuses for her not taking showers  She is still  suspicious about certain staff who gives her medications like her inhalers and the spirometer off and on which has not changed either   She thinks that we are withholding some information from her as she believes she has some terrible illness like cancer   She does not admit to any overt hallucinations or paranoia but still appears to harbor some covert  paranoia based on her demeanor and interaction on the unit   She again verbalizes her suspicion that that there is a micro chip on her right forearm and that it is not a fatty tumor   She still accuses the therapist of stealing her lover who is supposedly a medical doctor  No current suicidal homicidal thoughts intent or plans reported  No overt hallucinations or other delusions reported   No signs or sxs of agranulocytosis or myocarditis or endocarditis and she is moving her bowels so far with no issues since the recent increase in clozapine    Patient was again reminded to attend to ADLs skills and take showers at least every other day and attend more groups to keep up with her 25% expectation and she is not even compliant with the behavior plan that was developed with the therapist to address this        Current medications:    Current Facility-Administered Medications:     acetaminophen (TYLENOL) tablet 650 mg, 650 mg, Oral, Q6H PRN, Zander Yanez MD    albuterol (PROVENTIL HFA,VENTOLIN HFA) inhaler 2 puff, 2 puff, Inhalation, Q4H PRN, Zander Yanez MD, 2 puff at 10/11/19 0424    aluminum-magnesium hydroxide-simethicone (MYLANTA) 200-200-20 mg/5 mL oral suspension 15 mL, 15 mL, Oral, Q4H PRN, Zander Yanez MD    ammonium lactate (LAC-HYDRIN) 12 % lotion 1 application, 1 application, Topical, BID PRN, Zander Yanez MD    benzonatate (TESSALON PERLES) capsule 100 mg, 100 mg, Oral, TID PRN, Zander Yanez MD    benztropine (COGENTIN) injection 1 mg, 1 mg, Intramuscular, Q8H PRN, Zander Yanez MD    cloZAPine (CLOZARIL) tablet 25 mg, 25 mg, Oral, BID, Zander Yanez MD, 25 mg at 10/31/19 2043    cloZAPine (CLOZARIL) tablet 50 mg, 50 mg, Oral, BID, Zander Yanez MD, 50 mg at 10/31/19 2042    EPINEPHrine PF (ADRENALIN) 1 mg/mL injection 0 15 mg, 0 15 mg, Intramuscular, Once PRN, Zander Yanez MD    FLUoxetine (PROzac) capsule 30 mg, 30 mg, Oral, Daily, Zander Yanez MD, 30 mg at 11/01/19 0818    fluticasone-vilanterol (BREO ELLIPTA) 200-25 MCG/INH inhaler 1 puff, 1 puff, Inhalation, Daily, Zander Yanez MD, 1 puff at 11/01/19 0818    ketotifen (ZADITOR) 0 025 % ophthalmic solution 1 drop, 1 drop, Right Eye, BID PRN, Zander Yanez MD    levothyroxine tablet 125 mcg, 125 mcg, Oral, Early Morning, Zander Yanez MD, 125 mcg at 11/01/19 0549    magnesium hydroxide (MILK OF MAGNESIA) 400 mg/5 mL oral suspension 30 mL, 30 mL, Oral, Daily PRN, Zander Yanez MD    montelukast (SINGULAIR) tablet 10 mg, 10 mg, Oral, HS, Zander Yanez MD, 10 mg at 10/31/19 2113    OLANZapine (ZyPREXA) IM injection 5 mg, 5 mg, Intramuscular, Q8H PRN, Zander Yanez MD    OLANZapine (ZyPREXA) tablet 5 mg, 5 mg, Oral, Q8H PRN, Zander Yanez MD    ondansetron (ZOFRAN-ODT) dispersible tablet 4 mg, 4 mg, Oral, Q6H PRN, Ervin Little MD    pantoprazole (PROTONIX) EC tablet 40 mg, 40 mg, Oral, Early Morning, Ervin Little MD, 40 mg at 11/01/19 0549    polyethylene glycol (MIRALAX) packet 17 g, 17 g, Oral, Daily PRN, Ervin Little MD    polyvinyl alcohol (LIQUIFILM TEARS) 1 4 % ophthalmic solution 1 drop, 1 drop, Both Eyes, Q3H PRN, Ervin Little MD    theophylline (JEF-24) 24 hr capsule 200 mg, 200 mg, Oral, Daily, Ervin Little MD, 200 mg at 11/01/19 0818    tiotropium (SPIRIVA) capsule for inhaler 18 mcg, 18 mcg, Inhalation, Daily, Ervin Little MD, 18 mcg at 11/01/19 0818    traZODone (DESYREL) tablet 25 mg, 25 mg, Oral, HS PRN, Ervin Little MD  Justification if on more than two antipsychotics:  Only on his clozapine  Side effects if any:  None    Risks , benefits, side effects and precautions of medications discussed with patient and reminded patient to let us know any problems with medications     Objective:     Vital Signs:  Vitals:    10/31/19 2010 10/31/19 2150 11/01/19 0730 11/01/19 0732   BP: (!) 82/48  128/72 124/81   BP Location: Left arm  Left arm Left arm   Pulse: 96  93 94   Resp: 18  18    Temp: (!) 97 3 °F (36 3 °C)  98 1 °F (36 7 °C)    TempSrc: Temporal  Temporal    SpO2: 98% 97%     Weight:       Height:         Appearance:  age appropriate, casually dressed and overweight older than stated age poorly groomed disheveled unkempt with a body odor   Behavior:  deviant, evasive and guarded and suspicious but with good eye contact and preoccupied   Speech:  normal pitch and normal volume but tends to become loud at times and circumstantial   Mood:  anxious and dysthymic   Affect:  mood-congruent   Thought Process:  goal directed and illogical slightly pressured but able to be redirected and tangential and talks as if in a court of low being stilted which has not changed   Thought Content:  delusions  grandiose and somatic type about not being able to breathe despite being on nasal oxygen and paranoid about people out to harm her and Atrovent inhaler tainteded with peanut oil  No current suicidal homicidal thoughts intent or plans reported  No phobias obsessions or compulsions reported  Somatic delusions about having cancer or terrible illnesses or having a micro chip on her hand and the therapist stealing her lover   Perceptual Disturbances: None and does not appear responding to internal stimuli   Risk Potential: Tendency to not care for herself if she goes off treatment   Sensorium:  person, place, time/date, situation, day of week, month of year and time   Cognition:  grossly intact   Consciousness:  alert and awake    Attention: Intact concentration and attention span   Intellect: Considered to be at least of average intelligence   Insight:  limited in denial   Judgment: poor      Motor Activity: no abnormal movements         Recent Labs:  Results Reviewed     None          I/O Past 24 hours:  No intake/output data recorded  No intake/output data recorded          Assessment / Plan:     [facility]oaffective [facility]      Reason for continued inpatient care:  Because of underlying paranoia and refusal to go back to the personal care home and inability to care for herself on her own  Acceptance by patient:  Accepting  [place] in recovery:  About living in another personal care home once she feels better  Understanding of medications :  Has some understanding  Involved in reintegration process:  Adjusting to the unit  Trusting in relatoinship with psychiatrist:  Trusting    Recommended Treatment:    Medication changes:  1) no changes today  Non-pharmacological treatments  1) continue with  individual therapy group therapy, milieu therapy and occupational therapy and milieu therapy involving multidisciplinary team approach with psychotherapist, case management, nursing, peer support services, art therapist etc using recovery principles and psycho-education about accepting illness and need for treatment   3) encourage to attend to ADLs like taking showers and wearing clean clothes   4) Encourage to attend groups   Safety  1) Safety/communication plan established targeting dynamic risk factors above  Discharge Plan most likely back to the a:  Personal care boarding home with act services once her delusions are managed and mood becomes more stabilized and she becomes more receptive to treatment compliance    Counseling / Coordination of Care    Total floor / unit time spent today 15 minutes  Greater than 50% of total time was spent with the patient and / or family counseling and / or coordination of care  A description of the counseling / coordination of care  Patient's Rights, confidentiality and exceptions to confidentiality, use of automated medical record, [facility] [place] adeline staff access to medical record, and consent to treatment reviewed      Oval MD Joseluis

## 2019-11-02 PROCEDURE — 99231 SBSQ HOSP IP/OBS SF/LOW 25: CPT | Performed by: PHYSICIAN ASSISTANT

## 2019-11-02 RX ADMIN — MONTELUKAST SODIUM 10 MG: 10 TABLET, COATED ORAL at 21:07

## 2019-11-02 RX ADMIN — CLOZAPINE 50 MG: 25 TABLET ORAL at 20:50

## 2019-11-02 RX ADMIN — LEVOTHYROXINE SODIUM 125 MCG: 125 TABLET ORAL at 06:31

## 2019-11-02 RX ADMIN — PANTOPRAZOLE SODIUM 40 MG: 40 TABLET, DELAYED RELEASE ORAL at 06:32

## 2019-11-02 RX ADMIN — CLOZAPINE 50 MG: 25 TABLET ORAL at 16:56

## 2019-11-02 RX ADMIN — TIOTROPIUM BROMIDE 18 MCG: 18 CAPSULE ORAL; RESPIRATORY (INHALATION) at 09:07

## 2019-11-02 RX ADMIN — FLUTICASONE FUROATE AND VILANTEROL TRIFENATATE 1 PUFF: 200; 25 POWDER RESPIRATORY (INHALATION) at 09:07

## 2019-11-02 RX ADMIN — CLOZAPINE 25 MG: 25 TABLET ORAL at 16:57

## 2019-11-02 RX ADMIN — FLUOXETINE 30 MG: 20 CAPSULE ORAL at 09:07

## 2019-11-02 RX ADMIN — CLOZAPINE 25 MG: 25 TABLET ORAL at 20:50

## 2019-11-02 RX ADMIN — THEOPHYLLINE ANHYDROUS 200 MG: 200 CAPSULE, EXTENDED RELEASE ORAL at 09:07

## 2019-11-02 NOTE — PLAN OF CARE
Problem: Alteration in Thoughts and Perception  Goal: Verbalize thoughts and feelings  Description  Interventions:  - Promote a nonjudgmental and trusting relationship with the patient through active listening and therapeutic communication  - Assess patient's level of functioning, behavior and potential for risk  - Engage patient in 1 on 1 interactions for a minimum of 15 minutes each session  - Encourage patient to express fears, feelings, frustrations, and discuss symptoms    - [place] patient to reality, help patient recognize reality-based thinking   - Administer medications as ordered and assess for potential side effects  - Provide the patient education related to the signs and symptoms of the illness and desired effects of prescribed medications  Outcome: Progressing  Goal: Attend and participate in unit activities, including therapeutic, recreational, and educational groups  Description  Interventions:  - Provide therapeutic and educational activities daily, encourage attendance and participation, and document same in the medical record     CERTIFIED PEER SPECIALIST INTERVENTIONS:    Complete peer assessment with patient to assess their needs and identify their goals to complete while in the recovery program as well as once discharged into the community  Patient will complete WRAP Plan, Crisis Plan and [employer]  Patient will attend 50% of groups offered on the unit  Patient will complete a goal card weekly      Outcome: Not Progressing     Problem: Ineffective Coping  Goal: Identifies ineffective coping skills  Outcome: Progressing  Goal: Participates in unit activities  Description  Interventions:  - Provide therapeutic environment   - Provide required programming   - Redirect inappropriate behaviors   Outcome: Not Progressing     Problem: Risk for Self Injury/Neglect  Goal: Complete daily ADLs, including personal hygiene independently, as able  Description  Interventions:  - Observe, teach, and assist patient with ADLS  - Monitor and promote a balance of rest/activity, with adequate nutrition and elimination  Outcome: Not Progressing     Problem: Depression  Goal: Refrain from isolation  Description  Interventions:  - Develop a trusting relationship   - Encourage socialization   Outcome: Not Progressing     Problem: Anxiety  Goal: Anxiety is at manageable level  Description  Interventions:  - Assess and monitor patient's anxiety level  - Monitor for signs and symptoms of anxiety both physical and emotional (heart palpitations, chest pain, shortness of breath, headaches, nausea, feeling jumpy, restlessness, irritable, apprehensive)  - Collaborate with interdisciplinary team and initiate plan and interventions as ordered    - [place] patient to unit/surroundings  - Explain treatment plan  - Encourage participation in care  - Encourage verbalization of concerns/fears  - Identify coping mechanisms  - Assist in developing anxiety-reducing skills  - Administer/offer alternative therapies  - Limit or eliminate stimulants  Outcome: Progressing     Problem: Alteration in Orientation  Goal: Interact with staff daily  Description  Interventions:  - Assess and re-assess patient's level of orientation  - Engage patient in 1 on 1 interactions, daily, for a minimum of 15 minutes   - Establish rapport/trust with patient   Outcome: Progressing     Problem: PAIN - ADULT  Goal: Verbalizes/displays adequate comfort level or baseline comfort level  Description  Interventions:  - Encourage patient to monitor pain and request assistance  - Assess pain using appropriate pain scale  - Administer analgesics based on type and severity of pain and evaluate response  - Implement non-pharmacological measures as appropriate and evaluate response  - Consider cultural and social influences on pain and pain management  - Notify physician/advanced practitioner if interventions unsuccessful or patient reports new pain  Outcome: Progressing     Problem: SAFETY ADULT  Goal: Patient will remain free of falls  Description  INTERVENTIONS:  - Assess patient frequently for physical needs  -  Identify cognitive and physical deficits and behaviors that affect risk of falls  -  [place] fall precautions as indicated by assessment   - Educate patient/family on patient safety including physical limitations  - Instruct patient to call for assistance with activity based on assessment  - Modify environment to reduce risk of injury  - Consider OT/PT consult to assist with strengthening/mobility  Outcome: Progressing  Patient has been isolative to room, pleasant and cooperative on approach  Somatic at times, compliant with treatment

## 2019-11-02 NOTE — PLAN OF CARE
Problem: Alteration in Thoughts and Perception  Goal: Treatment Goal: Gain control of psychotic behaviors/thinking, reduce/eliminate presenting symptoms and demonstrate improved reality functioning upon discharge  Outcome: Progressing  Goal: Verbalize thoughts and feelings  Description  Interventions:  - Promote a nonjudgmental and trusting relationship with the patient through active listening and therapeutic communication  - Assess patient's level of functioning, behavior and potential for risk  - Engage patient in 1 on 1 interactions for a minimum of 15 minutes each session  - Encourage patient to express fears, feelings, frustrations, and discuss symptoms    - [place] patient to reality, help patient recognize reality-based thinking   - Administer medications as ordered and assess for potential side effects  - Provide the patient education related to the signs and symptoms of the illness and desired effects of prescribed medications  Outcome: Progressing  Goal: Agree to be compliant with medication regime, as prescribed and report medication side effects  Description  Interventions:  - Offer appropriate PRN medication and supervise ingestion; conduct aims, as needed   Outcome: Progressing  Goal: Attend and participate in unit activities, including therapeutic, recreational, and educational groups  Description  Interventions:  - Provide therapeutic and educational activities daily, encourage attendance and participation, and document same in the medical record     CERTIFIED PEER SPECIALIST INTERVENTIONS:    Complete peer assessment with patient to assess their needs and identify their goals to complete while in the recovery program as well as once discharged into the community  Patient will complete WRAP Plan, Crisis Plan and 5 Life Domains  Patient will attend 50% of groups offered on the unit  Patient will complete a goal card weekly      Outcome: Progressing  Goal: Recognize dysfunctional thoughts, communicate reality-based thoughts at the time of discharge  Description  Interventions:  - Provide medication and psycho-education to assist patient in compliance and developing insight into his/her illness   Outcome: Progressing  Goal: Complete daily ADLs, including personal hygiene independently, as able  Description  Interventions:  - Observe, teach, and assist patient with ADLS  - Monitor and promote a balance of rest/activity, with adequate nutrition and elimination   Outcome: Progressing     Problem: Ineffective Coping  Goal: Identifies ineffective coping skills  Outcome: Progressing  Goal: Identifies healthy coping skills  Outcome: Progressing  Goal: Demonstrates healthy coping skills  Outcome: Progressing  Goal: Participates in unit activities  Description  Interventions:  - Provide therapeutic environment   - Provide required programming   - Redirect inappropriate behaviors   Outcome: Progressing  Goal: Patient/Family participate in treatment and DC plans  Description  Interventions:  - Provide therapeutic environment  Outcome: Progressing  Goal: Patient/Family verbalizes awareness of resources  Outcome: Progressing  Goal: Understands least restrictive measures  Description  Interventions:  - Utilize least restrictive behavior  Outcome: Progressing     Problem: Risk for Self Injury/Neglect  Goal: Treatment Goal: Remain safe during length of stay, learn and adopt new coping skills, and be free of self-injurious ideation, impulses and acts at the time of discharge  Outcome: Progressing  Goal: Verbalize thoughts and feelings  Description  Interventions:  - Assess and re-assess patient's lethality and potential for self-injury  - Engage patient in 1:1 interactions, daily, for a minimum of 15 minutes  - Encourage patient to express feelings, fears, frustrations, hopes  - Establish rapport/trust with patient   Outcome: Progressing  Goal: Refrain from harming self  Description  Interventions:  - Monitor patient closely, per order  - Develop a trusting relationship  - Supervise medication ingestion, monitor effects and side effects   Outcome: Progressing  Goal: Attend and participate in unit activities, including therapeutic, recreational, and educational groups  Description  Interventions:  - Provide therapeutic and educational activities daily, encourage attendance and participation, and document same in the medical record  - Obtain collateral information, encourage visitation and family involvement in care   Outcome: Progressing  Goal: Recognize maladaptive responses and adopt new coping mechanisms  Outcome: Progressing  Goal: Complete daily ADLs, including personal hygiene independently, as able  Description  Interventions:  - Observe, teach, and assist patient with ADLS  - Monitor and promote a balance of rest/activity, with adequate nutrition and elimination  Outcome: Progressing     Problem: Depression  Goal: Treatment Goal: Demonstrate behavioral control of depressive symptoms, verbalize feelings of improved mood/affect, and adopt new coping skills prior to discharge  Outcome: Progressing  Goal: Verbalize thoughts and feelings  Description  Interventions:  - Assess and re-assess patient's level of risk   - Engage patient in 1:1 interactions, daily, for a minimum of 15 minutes   - Encourage patient to express feelings, fears, frustrations, hopes   Outcome: Progressing  Goal: Refrain from isolation  Description  Interventions:  - Develop a trusting relationship   - Encourage socialization   Outcome: Progressing  Goal: Refrain from self-neglect  Outcome: Progressing     Problem: Anxiety  Goal: Anxiety is at manageable level  Description  Interventions:  - Assess and monitor patient's anxiety level  - Monitor for signs and symptoms of anxiety both physical and emotional (heart palpitations, chest pain, shortness of breath, headaches, nausea, feeling jumpy, restlessness, irritable, apprehensive)     - Collaborate with interdisciplinary team and initiate plan and interventions as ordered  - [place] patient to unit/surroundings  - Explain treatment plan  - Encourage participation in care  - Encourage verbalization of concerns/fears  - Identify coping mechanisms  - Assist in developing anxiety-reducing skills  - Administer/offer alternative therapies  - Limit or eliminate stimulants  Outcome: Progressing     Problem: Alteration in Orientation  Goal: Interact with staff daily  Description  Interventions:  - Assess and re-assess patient's level of orientation  - Engage patient in 1 on 1 interactions, daily, for a minimum of 15 minutes   - Establish rapport/trust with patient   Outcome: Progressing  Goal: Cooperate with recommended testing/procedures  Description  Interventions:  - Determine need for ancillary testing  - Observe for mental status changes  - Implement falls/precaution protocol   Outcome: Progressing     Problem: PAIN - ADULT  Goal: Verbalizes/displays adequate comfort level or baseline comfort level  Description  Interventions:  - Encourage patient to monitor pain and request assistance  - Assess pain using appropriate pain scale  - Administer analgesics based on type and severity of pain and evaluate response  - Implement non-pharmacological measures as appropriate and evaluate response  - Consider cultural and social influences on pain and pain management  - Notify physician/advanced practitioner if interventions unsuccessful or patient reports new pain  Outcome: Progressing     Problem: SAFETY ADULT  Goal: Patient will remain free of falls  Description  INTERVENTIONS:  - Assess patient frequently for physical needs  -  Identify cognitive and physical deficits and behaviors that affect risk of falls    -  [place] fall precautions as indicated by assessment   - Educate patient/family on patient safety including physical limitations  - Instruct patient to call for assistance with activity based on assessment  - Modify environment to reduce risk of injury  - Consider OT/PT consult to assist with strengthening/mobility  Outcome: Progressing     Problem: RESPIRATORY - ADULT  Goal: Achieves optimal ventilation and oxygenation  Description  INTERVENTIONS:  - Assess for changes in respiratory status  - Assess for changes in mentation and behavior  - Position to facilitate oxygenation and minimize respiratory effort  - Oxygen administration by appropriate delivery method based on oxygen saturation (per order) or ABGs  - Initiate smoking cessation education as indicated  - Encourage broncho-pulmonary hygiene including cough, deep breathe, Incentive Spirometry  - Assess the need for suctioning and aspirate as needed  - Assess and instruct to report SOB or any respiratory difficulty  - Respiratory Therapy support as indicated  Outcome: Progressing     Problem: DISCHARGE PLANNING  Goal: Discharge to home or other facility with appropriate resources  Description  INTERVENTIONS:  - Conduct assessment to determine patient/family and health care team treatment goals, and need for post-acute services based on payer coverage, community resources, and patient preferences, and barriers to discharge  - Address psychosocial, clinical, and financial barriers to discharge as identified in assessment in conjunction with the patient/family and health care team  - Assist the patient in reintegration back into the community by removing barriers which may hinder a successful discharge once deemed stable  - Arrange appropriate level of post-acute services according to patient's needs and preference and payer coverage in collaboration with the physician and health care team  - Communicate with and update the patient/family, physician, and health care team regarding progress on the discharge plan  - Arrange appropriate transportation to post-acute venues    Outcome: Progressing     Problem: SLEEP DISTURBANCE  Goal: Will exhibit normal sleeping pattern  Description  Interventions:  -  Assess the patients sleep pattern, noting recent changes  - Administer medication as ordered  - Decrease environmental stimuli, including noise, as appropriate during the night  - Encourage the patient to actively participate in unit groups and or exercise during the day to enhance ability to achieve adequate sleep at night  - Assess the patient, in the morning, encouraging a description of sleep experience  Outcome: Progressing     Problem: Nutrition/Hydration-ADULT  Goal: Nutrient/Hydration intake appropriate for improving, restoring or maintaining nutritional needs  Description  Monitor and assess patient's nutrition/hydration status for malnutrition  Collaborate with interdisciplinary team and initiate plan and interventions as ordered  Monitor patient's weight and dietary intake as ordered or per policy  Utilize nutrition screening tool and intervene as necessary  Determine patient's food preferences and provide high-protein, high-caloric foods as appropriate  INTERVENTIONS:  - Monitor oral intake, urinary output, labs, and treatment plans  - Assess nutrition and hydration status and recommend course of action  - Evaluate amount of meals eaten  - Assist patient with eating if necessary   - Allow adequate time for meals  - Recommend/ encourage appropriate diets, oral nutritional supplements, and vitamin/mineral supplements  - Order, calculate, and assess calorie counts as needed  - Recommend, monitor, and adjust tube feedings and TPN/PPN based on assessed needs  - Assess need for intravenous fluids  - Provide specific nutrition/hydration education as appropriate  - Include patient/family/caregiver in decisions related to nutrition  Outcome: Progressing    Patient is isolative, anxious, and somatically preoccupied  She remains in bed, apathetic and uninterested in participating in anything related to unit and recovery process    Patient does not follow staff or doctor recommendations  Out of bed for med and meals only  She continues on pureed diet with thin liquids to which she supplements with Ensure at breakfast and lunch  Patient did not attend groups  She is non compliant with incentive spirometer  Presents with depression and anxiety  Denies pain    Will continue to monitor progress in recovery program

## 2019-11-02 NOTE — PROGRESS NOTES
Progress Note - Behavioral Health   Jose Cardenas 60 y o  female MRN: [id number]  Unit/Bed#: [facility] 110-02 Encounter: [id number]    Assessment/Plan   Principal Problem:    Schizoaffective disorder, bipolar type ([facility] [address] )  Active Problems:    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[facility] [address] )    Acquired hypothyroidism    Gastroesophageal reflux disease without esophagitis    At risk for aspiration      Subjective:  Patient denied most somatic complaints today  Apparently patient has somatic delusions, paranoid delusions, and grandiose delusions  Did not go in depth in this today  States she wanted to rest this morning  Overall was pleasant cooperative  Denied hallucinations  Denied excessive anxiety  Denied most depressive symptoms but does state she does not feel happy  Medication compliant  Appears to be tolerating medications well at serious side effects      Current Medications:  Current Facility-Administered Medications   Medication Dose Route Frequency    acetaminophen (TYLENOL) tablet 650 mg  650 mg Oral Q6H PRN    albuterol (PROVENTIL HFA,VENTOLIN HFA) inhaler 2 puff  2 puff Inhalation Q4H PRN    aluminum-magnesium hydroxide-simethicone (MYLANTA) 200-200-20 mg/5 mL oral suspension 15 mL  15 mL Oral Q4H PRN    ammonium lactate (LAC-HYDRIN) 12 % lotion 1 application  1 application Topical BID PRN    benzonatate (TESSALON PERLES) capsule 100 mg  100 mg Oral TID PRN    benztropine (COGENTIN) injection 1 mg  1 mg Intramuscular Q8H PRN    cloZAPine (CLOZARIL) tablet 25 mg  25 mg Oral BID    cloZAPine (CLOZARIL) tablet 50 mg  50 mg Oral BID    EPINEPHrine PF (ADRENALIN) 1 mg/mL injection 0 15 mg  0 15 mg Intramuscular Once PRN    FLUoxetine (PROzac) capsule 30 mg  30 mg Oral Daily    fluticasone-vilanterol (BREO ELLIPTA) 200-25 MCG/INH inhaler 1 puff  1 puff Inhalation Daily    ketotifen (ZADITOR) 0 025 % ophthalmic solution 1 drop  1 drop Right Eye BID PRN    levothyroxine tablet 125 mcg  125 mcg Oral Early Morning    magnesium hydroxide (MILK OF MAGNESIA) 400 mg/5 mL oral suspension 30 mL  30 mL Oral Daily PRN    montelukast (SINGULAIR) tablet 10 mg  10 mg Oral HS    OLANZapine (ZyPREXA) IM injection 5 mg  5 mg Intramuscular Q8H PRN    OLANZapine (ZyPREXA) tablet 5 mg  5 mg Oral Q8H PRN    ondansetron (ZOFRAN-ODT) dispersible tablet 4 mg  4 mg Oral Q6H PRN    pantoprazole (PROTONIX) EC tablet 40 mg  40 mg Oral Early Morning    polyethylene glycol (MIRALAX) packet 17 g  17 g Oral Daily PRN    polyvinyl alcohol (LIQUIFILM TEARS) 1 4 % ophthalmic solution 1 drop  1 drop Both Eyes Q3H PRN    theophylline (JEF-24) 24 hr capsule 200 mg  200 mg Oral Daily    tiotropium (SPIRIVA) capsule for inhaler 18 mcg  18 mcg Inhalation Daily    traZODone (DESYREL) tablet 25 mg  25 mg Oral HS PRN       Behavioral Health Medications: all current active meds have been reviewed and continue current psychiatric medications  Vitals:  Vitals:    11/02/19 0730   BP: 121/59   Pulse: 85   Resp: 19   Temp: 97 8 °F (36 6 °C)   SpO2:        Laboratory results:    I have personally reviewed all pertinent laboratory/tests results    Most Recent Labs:   Lab Results   Component Value Date    WBC 6 80 11/01/2019    RBC 4 36 11/01/2019    HGB 13 4 11/01/2019    HCT 40 5 11/01/2019     11/01/2019    RDW 13 5 11/01/2019    NEUTROABS 3 20 11/01/2019    SODIUM 140 10/25/2019    K 4 1 10/25/2019     10/25/2019    CO2 29 10/25/2019    BUN 16 10/25/2019    CREATININE 0 75 10/25/2019    GLUCOSE 85 05/18/2015    GLUC 93 10/25/2019    GLUF 93 10/25/2019    CALCIUM 9 1 10/25/2019    AST 23 10/25/2019    ALT 25 10/25/2019    ALKPHOS 104 10/25/2019    TP 6 3 10/25/2019    ALB 3 4 10/25/2019    TBILI 0 30 10/25/2019    CHOLESTEROL 210 (H) 10/25/2019    HDL 38 (L) 10/25/2019    TRIG 134 10/25/2019    LDLCALC 145 (H) 10/25/2019    [place] 172 10/25/2019    LITHIUM <0 2 (L) 05/01/2019    AMMONIA 13 02/22/2016    [id number] 0 985 08/21/2019    FREET4 1 4 05/12/2015    RPR Non-Reactive 05/02/2019    HGBA1C 5 5 01/17/2019     01/17/2019       Psychiatric Review of Systems:  Behavior over the last 24 hours:  unchanged  Sleep: normal  Appetite: poor  Medication side effects: No  ROS: no complaints    Mental Status Evaluation:  Appearance:  disheveled and older than stated age   Behavior:  guarded   Speech:  soft   Mood:  dysthymic   Affect:  constricted and flat   Language Appropriate   Thought Process:  goal directed and illogical   Thought Content:  Grandiose, somatic, and paranoid delusions   Perceptual Disturbances: None   Risk Potential: Denied SI/HI  Potential for aggression no   Sensorium:  person, place and time/date   Cognition:  grossly intact   Consciousness:  sedated    Recent and Remote Memory fair   Attention: attention span and concentration were age appropriate   Insight:  limited   Judgment: limited   Gait/Station: Did not assess   Motor Activity: no abnormal movements     Progress Toward Goals: unchanged    Recommended Treatment: Continue with group therapy, milieu therapy and occupational therapy  1   Continue current medications    Risks, benefits and possible side effects of Medications:   Risks, benefits, and possible side effects of medications explained to patient and patient verbalizes understanding        Zuleyma Waddell PA-C

## 2019-11-03 NOTE — PLAN OF CARE
Problem: Alteration in Thoughts and Perception  Goal: Verbalize thoughts and feelings  Description  Interventions:  - Promote a nonjudgmental and trusting relationship with the patient through active listening and therapeutic communication  - Assess patient's level of functioning, behavior and potential for risk  - Engage patient in 1 on 1 interactions for a minimum of 15 minutes each session  - Encourage patient to express fears, feelings, frustrations, and discuss symptoms    - [place] patient to reality, help patient recognize reality-based thinking   - Administer medications as ordered and assess for potential side effects  - Provide the patient education related to the signs and symptoms of the illness and desired effects of prescribed medications  Outcome: Progressing  Goal: Agree to be compliant with medication regime, as prescribed and report medication side effects  Description  Interventions:  - Offer appropriate PRN medication and supervise ingestion; conduct aims, as needed   Outcome: Progressing  Goal: Attend and participate in unit activities, including therapeutic, recreational, and educational groups  Description  Interventions:  - Provide therapeutic and educational activities daily, encourage attendance and participation, and document same in the medical record     CERTIFIED PEER SPECIALIST INTERVENTIONS:    Complete peer assessment with patient to assess their needs and identify their goals to complete while in the recovery program as well as once discharged into the community  Patient will complete WRAP Plan, Crisis Plan and 5 Life Domains  Patient will attend 50% of groups offered on the unit  Patient will complete a goal card weekly      Outcome: Not Progressing  Goal: Complete daily ADLs, including personal hygiene independently, as able  Description  Interventions:  - Observe, teach, and assist patient with ADLS  - Monitor and promote a balance of rest/activity, with adequate nutrition and elimination   Outcome: Progressing     Problem: Risk for Self Injury/Neglect  Goal: Complete daily ADLs, including personal hygiene independently, as able  Description  Interventions:  - Observe, teach, and assist patient with ADLS  - Monitor and promote a balance of rest/activity, with adequate nutrition and elimination  Outcome: Progressing     Problem: Depression  Goal: Refrain from isolation  Description  Interventions:  - Develop a trusting relationship   - Encourage socialization   Outcome: Not Progressing  Goal: Refrain from self-neglect  Outcome: Progressing     Problem: Anxiety  Goal: Anxiety is at manageable level  Description  Interventions:  - Assess and monitor patient's anxiety level  - Monitor for signs and symptoms of anxiety both physical and emotional (heart palpitations, chest pain, shortness of breath, headaches, nausea, feeling jumpy, restlessness, irritable, apprehensive)  - Collaborate with interdisciplinary team and initiate plan and interventions as ordered  - [place] patient to unit/surroundings  - Explain treatment plan  - Encourage participation in care  - Encourage verbalization of concerns/fears  - Identify coping mechanisms  - Assist in developing anxiety-reducing skills  - Administer/offer alternative therapies  - Limit or eliminate stimulants  Outcome: Progressing     Problem: Alteration in Orientation  Goal: Interact with staff daily  Description  Interventions:  - Assess and re-assess patient's level of orientation  - Engage patient in 1 on 1 interactions, daily, for a minimum of 15 minutes   - Establish rapport/trust with patient   Outcome: Progressing     Problem: SAFETY ADULT  Goal: Patient will remain free of falls  Description  INTERVENTIONS:  - Assess patient frequently for physical needs  -  Identify cognitive and physical deficits and behaviors that affect risk of falls    -  [place] fall precautions as indicated by assessment   - Educate patient/family on patient safety including physical limitations  - Instruct patient to call for assistance with activity based on assessment  - Modify environment to reduce risk of injury  - Consider OT/PT consult to assist with strengthening/mobility  Outcome: Progressing     Problem: RESPIRATORY - ADULT  Goal: Achieves optimal ventilation and oxygenation  Description  INTERVENTIONS:  - Assess for changes in respiratory status  - Assess for changes in mentation and behavior  - Position to facilitate oxygenation and minimize respiratory effort  - Oxygen administration by appropriate delivery method based on oxygen saturation (per order) or ABGs  - Initiate smoking cessation education as indicated  - Encourage broncho-pulmonary hygiene including cough, deep breathe, Incentive Spirometry  - Assess the need for suctioning and aspirate as needed  - Assess and instruct to report SOB or any respiratory difficulty  - Respiratory Therapy support as indicated  Outcome: Progressing     Problem: Nutrition/Hydration-ADULT  Goal: Nutrient/Hydration intake appropriate for improving, restoring or maintaining nutritional needs  Description  Monitor and assess patient's nutrition/hydration status for malnutrition  Collaborate with interdisciplinary team and initiate plan and interventions as ordered  Monitor patient's weight and dietary intake as ordered or per policy  Utilize nutrition screening tool and intervene as necessary  Determine patient's food preferences and provide high-protein, high-caloric foods as appropriate       INTERVENTIONS:  - Monitor oral intake, urinary output, labs, and treatment plans  - Assess nutrition and hydration status and recommend course of action  - Evaluate amount of meals eaten  - Assist patient with eating if necessary   - Allow adequate time for meals  - Recommend/ encourage appropriate diets, oral nutritional supplements, and vitamin/mineral supplements  - Order, calculate, and assess calorie counts as needed  - Recommend, monitor, and adjust tube feedings and TPN/PPN based on assessed needs  - Assess need for intravenous fluids  - Provide specific nutrition/hydration education as appropriate  - Include patient/family/caregiver in decisions related to nutrition  Outcome: Massiel Piña has been awake, alert, and visible intermittently out in the milieu  Pt has been resting in bed most of this shift  Needed much encouragement to shower but did with some assistance from staff  Still somatically preoccupied at times  Pt stated that she had anxiety and depression about showering  Ate 100% supper  Sister in to visit with good interaction noted  Pt did not attend evening group and declined snack after  Pt lacks insight into her illness and motivation to work on her recovery program   Pt refused to use her Incentive Spiromter this shift stating that she was "too tired "  Resting in bed at present with O2 on at 1 L via nasal cannula and oxygen concentrator, arouses easily  Continue to monitor/assess for any changes

## 2019-11-03 NOTE — PLAN OF CARE
Problem: Alteration in Thoughts and Perception  Goal: Verbalize thoughts and feelings  Description  Interventions:  - Promote a nonjudgmental and trusting relationship with the patient through active listening and therapeutic communication  - Assess patient's level of functioning, behavior and potential for risk  - Engage patient in 1 on 1 interactions for a minimum of 15 minutes each session  - Encourage patient to express fears, feelings, frustrations, and discuss symptoms    - [place] patient to reality, help patient recognize reality-based thinking   - Administer medications as ordered and assess for potential side effects  - Provide the patient education related to the signs and symptoms of the illness and desired effects of prescribed medications  Outcome: Progressing  Goal: Agree to be compliant with medication regime, as prescribed and report medication side effects  Description  Interventions:  - Offer appropriate PRN medication and supervise ingestion; conduct aims, as needed   Outcome: Progressing  Goal: Attend and participate in unit activities, including therapeutic, recreational, and educational groups  Description  Interventions:  - Provide therapeutic and educational activities daily, encourage attendance and participation, and document same in the medical record     CERTIFIED PEER SPECIALIST INTERVENTIONS:    Complete peer assessment with patient to assess their needs and identify their goals to complete while in the recovery program as well as once discharged into the community  Patient will complete WRAP Plan, Crisis Plan and 5 Life Domains  Patient will attend 50% of groups offered on the unit  Patient will complete a goal card weekly      Outcome: Progressing     Problem: Ineffective Coping  Goal: Participates in unit activities  Description  Interventions:  - Provide therapeutic environment   - Provide required programming   - Redirect inappropriate behaviors   Outcome: Progressing  Goal: Patient/Family verbalizes awareness of resources  Outcome: Progressing  Goal: Understands least restrictive measures  Description  Interventions:  - Utilize least restrictive behavior  Outcome: Progressing     Problem: Risk for Self Injury/Neglect  Goal: Verbalize thoughts and feelings  Description  Interventions:  - Assess and re-assess patient's lethality and potential for self-injury  - Engage patient in 1:1 interactions, daily, for a minimum of 15 minutes  - Encourage patient to express feelings, fears, frustrations, hopes  - Establish rapport/trust with patient   Outcome: Progressing  Goal: Refrain from harming self  Description  Interventions:  - Monitor patient closely, per order  - Develop a trusting relationship  - Supervise medication ingestion, monitor effects and side effects   Outcome: Progressing  Goal: Attend and participate in unit activities, including therapeutic, recreational, and educational groups  Description  Interventions:  - Provide therapeutic and educational activities daily, encourage attendance and participation, and document same in the medical record  - Obtain collateral information, encourage visitation and family involvement in care   Outcome: Progressing     Problem: Depression  Goal: Verbalize thoughts and feelings  Description  Interventions:  - Assess and re-assess patient's level of risk   - Engage patient in 1:1 interactions, daily, for a minimum of 15 minutes   - Encourage patient to express feelings, fears, frustrations, hopes   Outcome: Progressing  Goal: Refrain from isolation  Description  Interventions:  - Develop a trusting relationship   - Encourage socialization   Outcome: Progressing     Problem: Anxiety  Goal: Anxiety is at manageable level  Description  Interventions:  - Assess and monitor patient's anxiety level     - Monitor for signs and symptoms of anxiety both physical and emotional (heart palpitations, chest pain, shortness of breath, headaches, nausea, feeling jumpy, restlessness, irritable, apprehensive)  - Collaborate with interdisciplinary team and initiate plan and interventions as ordered  - [place] patient to unit/surroundings  - Explain treatment plan  - Encourage participation in care  - Encourage verbalization of concerns/fears  - Identify coping mechanisms  - Assist in developing anxiety-reducing skills  - Administer/offer alternative therapies  - Limit or eliminate stimulants  Outcome: Progressing     Problem: Alteration in Orientation  Goal: Interact with staff daily  Description  Interventions:  - Assess and re-assess patient's level of orientation  - Engage patient in 1 on 1 interactions, daily, for a minimum of 15 minutes   - Establish rapport/trust with patient   Outcome: Progressing     Problem: PAIN - ADULT  Goal: Verbalizes/displays adequate comfort level or baseline comfort level  Description  Interventions:  - Encourage patient to monitor pain and request assistance  - Assess pain using appropriate pain scale  - Administer analgesics based on type and severity of pain and evaluate response  - Implement non-pharmacological measures as appropriate and evaluate response  - Consider cultural and social influences on pain and pain management  - Notify physician/advanced practitioner if interventions unsuccessful or patient reports new pain  Outcome: Progressing     Problem: SAFETY ADULT  Goal: Patient will remain free of falls  Description  INTERVENTIONS:  - Assess patient frequently for physical needs  -  Identify cognitive and physical deficits and behaviors that affect risk of falls    -  [place] fall precautions as indicated by assessment   - Educate patient/family on patient safety including physical limitations  - Instruct patient to call for assistance with activity based on assessment  - Modify environment to reduce risk of injury  - Consider OT/PT consult to assist with strengthening/mobility  Outcome: Progressing Problem: Nutrition/Hydration-ADULT  Goal: Nutrient/Hydration intake appropriate for improving, restoring or maintaining nutritional needs  Description  Monitor and assess patient's nutrition/hydration status for malnutrition  Collaborate with interdisciplinary team and initiate plan and interventions as ordered  Monitor patient's weight and dietary intake as ordered or per policy  Utilize nutrition screening tool and intervene as necessary  Determine patient's food preferences and provide high-protein, high-caloric foods as appropriate       INTERVENTIONS:  - Monitor oral intake, urinary output, labs, and treatment plans  - Assess nutrition and hydration status and recommend course of action  - Evaluate amount of meals eaten  - Assist patient with eating if necessary   - Allow adequate time for meals  - Recommend/ encourage appropriate diets, oral nutritional supplements, and vitamin/mineral supplements  - Order, calculate, and assess calorie counts as needed  - Recommend, monitor, and adjust tube feedings and TPN/PPN based on assessed needs  - Assess need for intravenous fluids  - Provide specific nutrition/hydration education as appropriate  - Include patient/family/caregiver in decisions related to nutrition  Outcome: Progressing     Problem: Alteration in Thoughts and Perception  Goal: Recognize dysfunctional thoughts, communicate reality-based thoughts at the time of discharge  Description  Interventions:  - Provide medication and psycho-education to assist patient in compliance and developing insight into his/her illness   Outcome: Not Progressing     Problem: Depression  Goal: Refrain from self-neglect  Outcome: Not Progressing     Problem: Alteration in Orientation  Goal: Cooperate with recommended testing/procedures  Description  Interventions:  - Determine need for ancillary testing  - Observe for mental status changes  - Implement falls/precaution protocol   Outcome: Not Progressing     Problem: RESPIRATORY - ADULT  Goal: Achieves optimal ventilation and oxygenation  Description  INTERVENTIONS:  - Assess for changes in respiratory status  - Assess for changes in mentation and behavior  - Position to facilitate oxygenation and minimize respiratory effort  - Oxygen administration by appropriate delivery method based on oxygen saturation (per order) or ABGs  - Initiate smoking cessation education as indicated  - Encourage broncho-pulmonary hygiene including cough, deep breathe, Incentive Spirometry  - Assess the need for suctioning and aspirate as needed  - Assess and instruct to report SOB or any respiratory difficulty  - Respiratory Therapy support as indicated  Outcome: Not Progressing   Visible for meds and meals  Attended community meeting  Otherwise isolative to her bed in her room  Still does not follow staff recommendations to improve health and wellbeing  Refused incentive spirometer  No somatic complaints voiced  Disheveled and did not shower  Ate 50% breakfast and 25% of lunch plus an ensure  No other behaviors or issues noted  Continue to monitor

## 2019-11-03 NOTE — PROGRESS NOTES
Pt received @ 2300, sleeping since beginning of shift w/out any signs of distress   Will continue to monitor

## 2019-11-03 NOTE — PROGRESS NOTES
Progress Note - Behavioral Health   Pushpa Morgan 60 y o  female MRN: [id number]  Unit/Bed#: [facility]IO ADA[facility] 110-02 Encounter: [id number]    Assessment/Plan   Principal Problem:    Schizoaffective disorder, bipolar type ([facility] )  Active Problems:    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[facility] )    Acquired hypothyroidism    Gastroesophageal reflux disease without esophagitis    At risk for aspiration      Subjective:  Patient less somatic today  Did still complain about swallowing issues but was redirected  Reports that patient has additional paranoid and grandiose delusions  Stated she wanted to rest again this morning  Was overall pleasant and cooperative in interview  Denied hallucinations  Did not appear internally preoccupied  States she feels hopeless at times but denied suicidal thoughts today  Denied excessive anxiety  No signs of dylan  No agitation  Medication compliant  Appears to be tolerating medications well at serious side effects        Current Medications:  Current Facility-Administered Medications   Medication Dose Route Frequency    acetaminophen (TYLENOL) tablet 650 mg  650 mg Oral Q6H PRN    albuterol (PROVENTIL HFA,VENTOLIN HFA) inhaler 2 puff  2 puff Inhalation Q4H PRN    aluminum-magnesium hydroxide-simethicone (MYLANTA) 200-200-20 mg/5 mL oral suspension 15 mL  15 mL Oral Q4H PRN    ammonium lactate (LAC-HYDRIN) 12 % lotion 1 application  1 application Topical BID PRN    benzonatate (TESSALON PERLES) capsule 100 mg  100 mg Oral TID PRN    benztropine (COGENTIN) injection 1 mg  1 mg Intramuscular Q8H PRN    cloZAPine (CLOZARIL) tablet 25 mg  25 mg Oral BID    cloZAPine (CLOZARIL) tablet 50 mg  50 mg Oral BID    EPINEPHrine PF (ADRENALIN) 1 mg/mL injection 0 15 mg  0 15 mg Intramuscular Once PRN    FLUoxetine (PROzac) capsule 30 mg  30 mg Oral Daily    fluticasone-vilanterol (BREO ELLIPTA) 200-25 MCG/INH inhaler 1 puff  1 puff Inhalation Daily    ketotifen (ZADITOR) 0 025 % ophthalmic solution 1 drop  1 drop Right Eye BID PRN    levothyroxine tablet 125 mcg  125 mcg Oral Early Morning    magnesium hydroxide (MILK OF MAGNESIA) 400 mg/5 mL oral suspension 30 mL  30 mL Oral Daily PRN    montelukast (SINGULAIR) tablet 10 mg  10 mg Oral HS    OLANZapine (ZyPREXA) IM injection 5 mg  5 mg Intramuscular Q8H PRN    OLANZapine (ZyPREXA) tablet 5 mg  5 mg Oral Q8H PRN    ondansetron (ZOFRAN-ODT) dispersible tablet 4 mg  4 mg Oral Q6H PRN    pantoprazole (PROTONIX) EC tablet 40 mg  40 mg Oral Early Morning    polyethylene glycol (MIRALAX) packet 17 g  17 g Oral Daily PRN    polyvinyl alcohol (LIQUIFILM TEARS) 1 4 % ophthalmic solution 1 drop  1 drop Both Eyes Q3H PRN    theophylline (JEF-24) 24 hr capsule 200 mg  200 mg Oral Daily    tiotropium (SPIRIVA) capsule for inhaler 18 mcg  18 mcg Inhalation Daily    traZODone (DESYREL) tablet 25 mg  25 mg Oral HS PRN       Behavioral Health Medications: all current active meds have been reviewed and continue current psychiatric medications  Vitals:  Vitals:    11/03/19 0732   BP: 114/55   Pulse: 76   Resp:    Temp:    SpO2:        Laboratory results:    I have personally reviewed all pertinent laboratory/tests results    Most Recent Labs:   Lab Results   Component Value Date    WBC 6 80 11/01/2019    RBC 4 36 11/01/2019    HGB 13 4 11/01/2019    HCT 40 5 11/01/2019     11/01/2019    RDW 13 5 11/01/2019    NEUTROABS 3 20 11/01/2019    SODIUM 140 10/25/2019    K 4 1 10/25/2019     10/25/2019    CO2 29 10/25/2019    BUN 16 10/25/2019    CREATININE 0 75 10/25/2019    GLUCOSE 85 05/18/2015    GLUC 93 10/25/2019    GLUF 93 10/25/2019    CALCIUM 9 1 10/25/2019    AST 23 10/25/2019    ALT 25 10/25/2019    ALKPHOS 104 10/25/2019    TP 6 3 10/25/2019    ALB 3 4 10/25/2019    TBILI 0 30 10/25/2019    CHOLESTEROL 210 (H) 10/25/2019    HDL 38 (L) 10/25/2019    TRIG 134 10/25/2019    LDLCALC 145 (H) 10/25/2019    [place] 172 10/25/2019    LITHIUM <0 2 (L) 05/01/2019 AMMON[place] 13 02/22/2016    [id number] 0 985 08/21/2019    FREET4 1 4 05/12/2015    RPR Non-Reactive 05/02/2019    HGBA1C 5 5 01/17/2019     01/17/2019       Psychiatric Review of Systems:  Behavior over the last 24 hours:  unchanged  Sleep: normal  Appetite: poor  Medication side effects: No  ROS: Swallowing complaints    Mental Status Evaluation:  Appearance:  disheveled and older than stated age   Behavior:  guarded but cooperative   Speech:  soft   Mood:  dysthymic   Affect:  constricted and flat   Language Appropriate   Thought Process:  goal directed   Thought Content:  Grandiose, somatic, paranoid delusions   Perceptual Disturbances: None   Risk Potential: Denied SI/HI  Potential for aggression no   Sensorium:  person, place and time/date   Cognition:  grossly intact   Consciousness:  sedated    Recent and Remote Memory fair   Attention: attention span and concentration were age appropriate   Insight:  limited   Judgment: limited   Gait/Station: Did not assess   Motor Activity: no abnormal movements     Progress Toward Goals:  Unchanged    Recommended Treatment: Continue with group therapy, milieu therapy and occupational therapy  1   Continue current medications    Risks, benefits and possible side effects of Medications:   Risks, benefits, and possible side effects of medications explained to patient and patient verbalizes understanding        Tessa Enriquez PA-C

## 2019-11-04 NOTE — PLAN OF CARE
Problem: Alteration in Thoughts and Perception  Goal: Verbalize thoughts and feelings  Description  Interventions:  - Promote a nonjudgmental and trusting relationship with the patient through active listening and therapeutic communication  - Assess patient's level of functioning, behavior and potential for risk  - Engage patient in 1 on 1 interactions for a minimum of 15 minutes each session  - Encourage patient to express fears, feelings, frustrations, and discuss symptoms    - [place] patient to reality, help patient recognize reality-based thinking   - Administer medications as ordered and assess for potential side effects  - Provide the patient education related to the signs and symptoms of the illness and desired effects of prescribed medications  Outcome: Progressing  Goal: Agree to be compliant with medication regime, as prescribed and report medication side effects  Description  Interventions:  - Offer appropriate PRN medication and supervise ingestion; conduct aims, as needed   Outcome: Progressing     Problem: Ineffective Coping  Goal: Patient/Family verbalizes awareness of resources  Outcome: Progressing  Goal: Understands least restrictive measures  Description  Interventions:  - Utilize least restrictive behavior  Outcome: Progressing     Problem: Risk for Self Injury/Neglect  Goal: Treatment Goal: Remain safe during length of stay, learn and adopt new coping skills, and be free of self-injurious ideation, impulses and acts at the time of discharge  Outcome: Progressing  Goal: Verbalize thoughts and feelings  Description  Interventions:  - Assess and re-assess patient's lethality and potential for self-injury  - Engage patient in 1:1 interactions, daily, for a minimum of 15 minutes  - Encourage patient to express feelings, fears, frustrations, hopes  - Establish rapport/trust with patient   Outcome: Progressing  Goal: Refrain from harming self  Description  Interventions:  - Monitor patient closely, per order  - Develop a trusting relationship  - Supervise medication ingestion, monitor effects and side effects   Outcome: Progressing     Problem: Depression  Goal: Verbalize thoughts and feelings  Description  Interventions:  - Assess and re-assess patient's level of risk   - Engage patient in 1:1 interactions, daily, for a minimum of 15 minutes   - Encourage patient to express feelings, fears, frustrations, hopes   Outcome: Progressing     Problem: Anxiety  Goal: Anxiety is at manageable level  Description  Interventions:  - Assess and monitor patient's anxiety level  - Monitor for signs and symptoms of anxiety both physical and emotional (heart palpitations, chest pain, shortness of breath, headaches, nausea, feeling jumpy, restlessness, irritable, apprehensive)  - Collaborate with interdisciplinary team and initiate plan and interventions as ordered    - [place] patient to unit/surroundings  - Explain treatment plan  - Encourage participation in care  - Encourage verbalization of concerns/fears  - Identify coping mechanisms  - Assist in developing anxiety-reducing skills  - Administer/offer alternative therapies  - Limit or eliminate stimulants  Outcome: Progressing     Problem: Alteration in Orientation  Goal: Interact with staff daily  Description  Interventions:  - Assess and re-assess patient's level of orientation  - Engage patient in 1 on 1 interactions, daily, for a minimum of 15 minutes   - Establish rapport/trust with patient   Outcome: Progressing     Problem: PAIN - ADULT  Goal: Verbalizes/displays adequate comfort level or baseline comfort level  Description  Interventions:  - Encourage patient to monitor pain and request assistance  - Assess pain using appropriate pain scale  - Administer analgesics based on type and severity of pain and evaluate response  - Implement non-pharmacological measures as appropriate and evaluate response  - Consider cultural and social influences on pain and pain management  - Notify physician/advanced practitioner if interventions unsuccessful or patient reports new pain  Outcome: Progressing     Problem: SAFETY ADULT  Goal: Patient will remain free of falls  Description  INTERVENTIONS:  - Assess patient frequently for physical needs  -  Identify cognitive and physical deficits and behaviors that affect risk of falls  -  [place] fall precautions as indicated by assessment   - Educate patient/family on patient safety including physical limitations  - Instruct patient to call for assistance with activity based on assessment  - Modify environment to reduce risk of injury  - Consider OT/PT consult to assist with strengthening/mobility  Outcome: Progressing     Problem: Nutrition/Hydration-ADULT  Goal: Nutrient/Hydration intake appropriate for improving, restoring or maintaining nutritional needs  Description  Monitor and assess patient's nutrition/hydration status for malnutrition  Collaborate with interdisciplinary team and initiate plan and interventions as ordered  Monitor patient's weight and dietary intake as ordered or per policy  Utilize nutrition screening tool and intervene as necessary  Determine patient's food preferences and provide high-protein, high-caloric foods as appropriate       INTERVENTIONS:  - Monitor oral intake, urinary output, labs, and treatment plans  - Assess nutrition and hydration status and recommend course of action  - Evaluate amount of meals eaten  - Assist patient with eating if necessary   - Allow adequate time for meals  - Recommend/ encourage appropriate diets, oral nutritional supplements, and vitamin/mineral supplements  - Order, calculate, and assess calorie counts as needed  - Recommend, monitor, and adjust tube feedings and TPN/PPN based on assessed needs  - Assess need for intravenous fluids  - Provide specific nutrition/hydration education as appropriate  - Include patient/family/caregiver in decisions related to nutrition  Outcome: Progressing     Problem: Alteration in Thoughts and Perception  Goal: Treatment Goal: Gain control of psychotic behaviors/thinking, reduce/eliminate presenting symptoms and demonstrate improved reality functioning upon discharge  Outcome: Not Progressing  Goal: Attend and participate in unit activities, including therapeutic, recreational, and educational groups  Description  Interventions:  - Provide therapeutic and educational activities daily, encourage attendance and participation, and document same in the medical record     CERTIFIED PEER SPECIALIST INTERVENTIONS:    Complete peer assessment with patient to assess their needs and identify their goals to complete while in the recovery program as well as once discharged into the community  Patient will complete WRAP Plan, Crisis Plan and 5 Life Domains  Patient will attend 50% of groups offered on the unit  Patient will complete a goal card weekly      Outcome: Not Progressing  Goal: Recognize dysfunctional thoughts, communicate reality-based thoughts at the time of discharge  Description  Interventions:  - Provide medication and psycho-education to assist patient in compliance and developing insight into his/her illness   Outcome: Not Progressing  Goal: Complete daily ADLs, including personal hygiene independently, as able  Description  Interventions:  - Observe, teach, and assist patient with ADLS  - Monitor and promote a balance of rest/activity, with adequate nutrition and elimination   Outcome: Not Progressing     Problem: Ineffective Coping  Goal: Identifies ineffective coping skills  Outcome: Not Progressing  Goal: Identifies healthy coping skills  Outcome: Not Progressing  Goal: Demonstrates healthy coping skills  Outcome: Not Progressing  Goal: Participates in unit activities  Description  Interventions:  - Provide therapeutic environment   - Provide required programming   - Redirect inappropriate behaviors   Outcome: Not Progressing     Problem: Risk for Self Injury/Neglect  Goal: Attend and participate in unit activities, including therapeutic, recreational, and educational groups  Description  Interventions:  - Provide therapeutic and educational activities daily, encourage attendance and participation, and document same in the medical record  - Obtain collateral information, encourage visitation and family involvement in care   Outcome: Not Progressing  Goal: Recognize maladaptive responses and adopt new coping mechanisms  Outcome: Not Progressing  Goal: Complete daily ADLs, including personal hygiene independently, as able  Description  Interventions:  - Observe, teach, and assist patient with ADLS  - Monitor and promote a balance of rest/activity, with adequate nutrition and elimination  Outcome: Not Progressing     Problem: Depression  Goal: Treatment Goal: Demonstrate behavioral control of depressive symptoms, verbalize feelings of improved mood/affect, and adopt new coping skills prior to discharge  Outcome: Not Progressing  Goal: Refrain from isolation  Description  Interventions:  - Develop a trusting relationship   - Encourage socialization   Outcome: Not Progressing  Goal: Refrain from self-neglect  Outcome: Not Progressing     Problem: RESPIRATORY - ADULT  Goal: Achieves optimal ventilation and oxygenation  Description  INTERVENTIONS:  - Assess for changes in respiratory status  - Assess for changes in mentation and behavior  - Position to facilitate oxygenation and minimize respiratory effort  - Oxygen administration by appropriate delivery method based on oxygen saturation (per order) or ABGs  - Initiate smoking cessation education as indicated  - Encourage broncho-pulmonary hygiene including cough, deep breathe, Incentive Spirometry  - Assess the need for suctioning and aspirate as needed  - Assess and instruct to report SOB or any respiratory difficulty  - Respiratory Therapy support as indicated  Outcome: Not Progressing   Visible for meds and meals, otherwise isolative to her bed in her room and did not attend any groups  Still does not follow staff recommendations to improve health and wellbeing  Refused incentive spirometer  No somatic complaints voiced  Disheveled and did not shower  Ate 100% breakfast and 50% of lunch plus an ensure  No other behaviors or issues noted  Continue to monitor

## 2019-11-04 NOTE — PROGRESS NOTES
11/04/19 1200   Team Meeting   Meeting Type Tx Team Meeting   Initial Conference Date 11/04/19   Next Conference Date 11/11/19   Team Members Present   Team Members Present Physician;Nurse;; Other (Discipline and Name)   Physician Team Member Dr Shane Berg Team Member Lyndsey Olivas RN   Care Management Team Member Brenda Rendon   Other (Discipline and Name) Carin Powers, [facility] REYES   Patient/Family Present   Patient Present Yes   Patient's Family Present No     Patient attended treatment team meeting this morning without her self assessment completed  Patient attended 10% of groups offered last week  Patient continues to struggle with participating in the recovery program on the [facility]  Patient reported somatic complaints again to the treatment team  She also noted that she would like to be referred to the [facility] where she has been in the past  Carin Powers, [address] [place] with [facility] REYES reported she is not able to return there because she was discharged from the program as they were not able to meet her needs  Patient continues to decline advice and suggestions from the treatment team on how patient can work the program so that she gets closer to being able to be discharged from the hospital  Team and patient completed risk assessment and the patient did not verbalize any desire to elope from the program  Patient verbalized understanding of consequences of eloping from treatment while on a commitment  Patient verbalized no further questions or concerns at the conclusion of the meeting

## 2019-11-04 NOTE — PROGRESS NOTES
Progress Note - [employer] 1955, 60 y o  female MRN: [id number]    Unit/Bed#: [facility]ARCIO ADA[facility] 110-02 Encounter: [id number]    Primary Care Provider: Praveen Barrios PA-C   Date and time admitted to hospital: 7/23/2019  5:30 PM        * Schizoaffective disorder, bipolar type [facility])  Assessment & Plan  Psychiatry Progress Note    Patient did finally take a shower last Saturday after about a week and been attending only 10% of groups  She continues to have psychosomatic symptoms with the fear that she is going to die here with low blood sugar, breathing problem, swallowing difficulty, and believes that she has cancer and these beliefs have not changed at all  She has multiple excuses about not taking showers and not attending groups  She is still suspicious about certain staff who gives her medications like her inhalers and the spirometer off and on which has not changed either   She accuses the team of withholding  information from her that she has some terrible illness like cancer   She does not admit to any overt hallucinations or paranoia but still appears to harbor some covert  paranoia based on her demeanor and interaction on the unit   She again verbalizes her suspicion that that there is a micro chip on her right forearm and still accuses the therapist of stealing her lover who is supposedly a medical doctor here  No current suicidal homicidal thoughts intent or plans reported  No overt hallucinations or other delusions reported   No signs or sxs of agranulocytosis or myocarditis or endocarditis and she is moving her bowels so far with no issues   Patient was again reminded to attend to ADLs skills and take showers at least every other day and attend more groups to keep up with her 25% expectation   She wanted to go back to the [facility] called [facility] in [address] [place] [address], but [address] rep told her she can only go back to the [facility] which is in [place]   Current medications:    Current Facility-Administered Medications:     acetaminophen (TYLENOL) tablet 650 mg, 650 mg, Oral, Q6H PRN, Jennifer Taveras MD    albuterol (PROVENTIL HFA,VENTOLIN HFA) inhaler 2 puff, 2 puff, Inhalation, Q4H PRN, Jennifer Taveras MD, 2 puff at 10/11/19 0424    aluminum-magnesium hydroxide-simethicone (MYLANTA) 200-200-20 mg/5 mL oral suspension 15 mL, 15 mL, Oral, Q4H PRN, Jennifer Taveras MD    ammonium lactate (LAC-HYDRIN) 12 % lotion 1 application, 1 application, Topical, BID PRN, Jennifer Taveras MD    benzonatate (TESSALON PERLES) capsule 100 mg, 100 mg, Oral, TID PRN, Jennifer Taveras MD    benztropine (COGENTIN) injection 1 mg, 1 mg, Intramuscular, Q8H PRN, Jennifer Taveras MD    cloZAPine (CLOZARIL) tablet 25 mg, 25 mg, Oral, BID, Jennifer Taveras MD, 25 mg at 11/03/19 2115    cloZAPine (CLOZARIL) tablet 50 mg, 50 mg, Oral, BID, Jennifer Taveras MD, 50 mg at 11/03/19 2114    EPINEPHrine PF (ADRENALIN) 1 mg/mL injection 0 15 mg, 0 15 mg, Intramuscular, Once PRN, Jennifer Taveras MD    FLUoxetine (PROzac) capsule 30 mg, 30 mg, Oral, Daily, Jennifer Taveras MD, 30 mg at 11/04/19 0857    fluticasone-vilanterol (BREO ELLIPTA) 200-25 MCG/INH inhaler 1 puff, 1 puff, Inhalation, Daily, Jennifer Taveras MD, 1 puff at 11/04/19 0857    ketotifen (ZADITOR) 0 025 % ophthalmic solution 1 drop, 1 drop, Right Eye, BID PRN, Jennifer Taveras MD    levothyroxine tablet 125 mcg, 125 mcg, Oral, Early Morning, Jennifer Taveras MD, 125 mcg at 11/04/19 0609    magnesium hydroxide (MILK OF MAGNESIA) 400 mg/5 mL oral suspension 30 mL, 30 mL, Oral, Daily PRN, Jennifer Taveras MD    montelukast (SINGULAIR) tablet 10 mg, 10 mg, Oral, HS, Jennifer Taveras MD, 10 mg at 11/03/19 2114    OLANZapine (ZyPREXA) IM injection 5 mg, 5 mg, Intramuscular, Q8H PRN, Jennifer Taveras MD    OLANZapine (ZyPREXA) tablet 5 mg, 5 mg, Oral, Q8H PRN, Jennifer Taveras MD    ondansetron (ZOFRAN-ODT) dispersible tablet 4 mg, 4 mg, Oral, Q6H PRN, Jennifer Taveras MD    pantoprazole (PROTONIX) EC tablet 40 mg, 40 mg, Oral, Early Morning, Chichi Lockett MD, 40 mg at 11/04/19 0609    polyethylene glycol (MIRALAX) packet 17 g, 17 g, Oral, Daily PRN, Chichi Lockett MD    polyvinyl alcohol (LIQUIFILM TEARS) 1 4 % ophthalmic solution 1 drop, 1 drop, Both Eyes, Q3H PRN, Chichi Lockett MD    theophylline (JEF-24) 24 hr capsule 200 mg, 200 mg, Oral, Daily, Chichi Lockett MD, 200 mg at 11/04/19 0857    tiotropium (SPIRIVA) capsule for inhaler 18 mcg, 18 mcg, Inhalation, Daily, Chichi Lockett MD, 18 mcg at 11/04/19 0858    traZODone (DESYREL) tablet 25 mg, 25 mg, Oral, HS PRN, Chichi Lockett MD  Justification if on more than two antipsychotics:  Only on his clozapine  Side effects if any:  None    Risks , benefits, side effects and precautions of medications discussed with patient and reminded patient to let us know any problems with medications     Objective:     Vital Signs:  Vitals:    11/03/19 1956 11/03/19 2142 11/04/19 0700 11/04/19 0705   BP: 104/64  103/67 106/59   BP Location: Left arm  Left arm Left arm   Pulse: 101 96 80 72   Resp: 16  18 18   Temp: 97 6 °F (36 4 °C)  97 9 °F (36 6 °C)    TempSrc: Temporal  Temporal    SpO2: 92% 94% 97%    Weight:       Height:         Appearance:  age appropriate, casually dressed and overweight older than stated age, uncombed long grey hair, poorly groomed disheveled unkempt with a body odor   Behavior:  evasive and guarded and suspicious but with good eye contact and preoccupied   Speech:  normal pitch and normal volume but tends to become loud at times and circumstantial and tangential   Mood:  anxious and dysthymic   Affect:  mood-congruent, elated and entitled   Thought Process:  goal directed and illogical slightly pressured but able to be redirected and tangential and talks as if in a court of law being stilted which has not changed   Thought Content:  delusions  grandiose and somatic type about not being able to breathe despite being on nasal oxygen and paranoid about people out to harm her and Atrovent inhaler tainteded with peanut oil  No current suicidal homicidal thoughts intent or plans reported  No phobias obsessions or compulsions reported  Somatic delusions about having cancer or terrible illnesses like cancer or having a micro chip on her hand and the therapist stealing her lover from her   Perceptual Disturbances: None and does not appear responding to internal stimuli   Risk Potential: Tendency to not care for herself if she goes off treatment   Sensorium:  person, place, time/date, situation, day of week, month of year and time   Cognition:  grossly intact   Consciousness:  alert and awake    Attention: Intact concentration and attention span   Intellect: Considered to be at least of average intelligence   Insight:  limited in denial   Judgment: poor      Motor Activity: no abnormal movements         Recent Labs:  Results Reviewed     None          I/O Past 24 hours:  No intake/output data recorded  No intake/output data recorded          Assessment / Plan:     Schizoaffective disorder, [facility] ([facility] )      Reason for continued inpatient care:  Because of underlying paranoia and refusal to go back to the personal care home and inability to care for herself on her own  Acceptance by patient:  Accepting  Gladys Vincent in recovery:  About living in another personal care home once she feels better  Understanding of medications :  Has some understanding  Involved in reintegration process:  Adjusting to the unit  Trusting in relatoinship with psychiatrist:  Trusting    Recommended Treatment:    Medication changes:  1) no changes today  Non-pharmacological treatments  1) continue with  individual therapy group therapy, milieu therapy and occupational therapy and milieu therapy involving multidisciplinary team approach with psychotherapist, case management, nursing, peer support services, art therapist etc using recovery principles and psycho-education about accepting illness and need for treatment   3) encourage to attend to ADLs like taking showers and wearing clean clothes   4) Encourage to attend groups   Safety  1) Safety/communication plan established targeting dynamic risk factors above  Discharge Plan most likely back to the a:  Personal care boarding home with act services once her delusions are managed and mood becomes more stabilized and she becomes more receptive to treatment compliance    Counseling / Coordination of Care    Total floor / unit time spent today 15 minutes  Greater than 50% of total time was spent with the patient and / or family counseling and / or coordination of care  A description of the counseling / coordination of care  Patient's Rights, confidentiality and exceptions to confidentiality, use of automated medical record, Jeb Patrick staff access to medical record, and consent to treatment reviewed      Prakash Brewster MD

## 2019-11-04 NOTE — PROGRESS NOTES
11/04/19 0900   Team Meeting   Meeting Type Daily Rounds   Team Members Present   Team Members Present Physician;Nurse;; Other (Discipline and Name)   Physician Team Member Dr Sulma Tan Team Member Joseph Rosenthal RN   Care Management Team Member Brenda Fallon   Other (Discipline and Name) Karly Beasley, Ph D, [facility]     Patient still presents somatic and preoccupied  Isolative to her room  Slept

## 2019-11-04 NOTE — PROGRESS NOTES
Sleeping at this time, no s/s of distress noted  O2 on at 1 l/min via NC, no resp distress noted  Precautions in place and maintained at this time   Monitoring continues

## 2019-11-04 NOTE — PLAN OF CARE
Problem: Alteration in Thoughts and Perception  Goal: Agree to be compliant with medication regime, as prescribed and report medication side effects  Description  Interventions:  - Offer appropriate PRN medication and supervise ingestion; conduct aims, as needed   Outcome: Progressing  Goal: Complete daily ADLs, including personal hygiene independently, as able  Description  Interventions:  - Observe, teach, and assist patient with ADLS  - Monitor and promote a balance of rest/activity, with adequate nutrition and elimination   Outcome: Progressing     Problem: Risk for Self Injury/Neglect  Goal: Verbalize thoughts and feelings  Description  Interventions:  - Assess and re-assess patient's lethality and potential for self-injury  - Engage patient in 1:1 interactions, daily, for a minimum of 15 minutes  - Encourage patient to express feelings, fears, frustrations, hopes  - Establish rapport/trust with patient   Outcome: Progressing  Goal: Refrain from harming self  Description  Interventions:  - Monitor patient closely, per order  - Develop a trusting relationship  - Supervise medication ingestion, monitor effects and side effects   Outcome: Progressing     Problem: SAFETY ADULT  Goal: Patient will remain free of falls  Description  INTERVENTIONS:  - Assess patient frequently for physical needs  -  Identify cognitive and physical deficits and behaviors that affect risk of falls    -  [place] fall precautions as indicated by assessment   - Educate patient/family on patient safety including physical limitations  - Instruct patient to call for assistance with activity based on assessment  - Modify environment to reduce risk of injury  - Consider OT/PT consult to assist with strengthening/mobility  Outcome: Progressing     Problem: RESPIRATORY - ADULT  Goal: Achieves optimal ventilation and oxygenation  Description  INTERVENTIONS:  - Assess for changes in respiratory status  - Assess for changes in mentation and behavior  - Position to facilitate oxygenation and minimize respiratory effort  - Oxygen administration by appropriate delivery method based on oxygen saturation (per order) or ABGs  - Initiate smoking cessation education as indicated  - Encourage broncho-pulmonary hygiene including cough, deep breathe, Incentive Spirometry  - Assess the need for suctioning and aspirate as needed  - Assess and instruct to report SOB or any respiratory difficulty  - Respiratory Therapy support as indicated  Outcome: Progressing     Problem: Alteration in Thoughts and Perception  Goal: Attend and participate in unit activities, including therapeutic, recreational, and educational groups  Description  Interventions:  - Provide therapeutic and educational activities daily, encourage attendance and participation, and document same in the medical record     CERTIFIED PEER SPECIALIST INTERVENTIONS:    Complete peer assessment with patient to assess their needs and identify their goals to complete while in the recovery program as well as once discharged into the community  Patient will complete WRAP Plan, Crisis Plan and 5 Life Domains  Patient will attend 50% of groups offered on the unit  Patient will complete a goal card weekly  Outcome: Not Progressing     Problem: Depression  Goal: Refrain from isolation  Description  Interventions:  - Develop a trusting relationship   - Encourage socialization   Outcome: Jannette Randolph has been in her room most of the shift  Naps at times, otherwise sits or lays in bed  Somatic this shift  Prompted for medications and when she came out she stated she felt her BP was low  She was 105/64 prior to this and then was 110/70 upon retake  Encouraged to get out of bed and move around instead of laying down all day  She drank an Ensure and ate 10% of her meal  An hour after her supper she wanted her blood sugar taken   "I am not eating very well " It was explained to her that every time someone checked it her sugar was normal or above normal levels  "I want it to be around 120 before I go to bed " No respiratory distress  No shower or BM this evening  Did not attend evening group  Came for medication without prompting  Ate snack  Oxygen saturation was 94% on room air prior to Oxygen 1 liter applied via nasal cannula  Continue to monitor  Precautions maintained

## 2019-11-04 NOTE — ASSESSMENT & PLAN NOTE
Psychiatry Progress Note    Patient did finally take a shower last Saturday after about a week and been attending only 10% of groups  She continues to have psychosomatic symptoms with the fear that she is going to die here with low blood sugar, breathing problem, swallowing difficulty, and believes that she has cancer and these beliefs have not changed at all  She has multiple excuses about not taking showers and not attending groups  She is still suspicious about certain staff who gives her medications like her inhalers and the spirometer off and on which has not changed either   She accuses the team of withholding  information from her that she has some terrible illness like cancer   She does not admit to any overt hallucinations or paranoia but still appears to harbor some covert  paranoia based on her demeanor and interaction on the unit   She again verbalizes her suspicion that that there is a micro chip on her right forearm and still accuses the therapist of stealing her lover who is supposedly a medical doctor here  No current suicidal homicidal thoughts intent or plans reported  No overt hallucinations or other delusions reported   No signs or sxs of agranulocytosis or myocarditis or endocarditis and she is moving her bowels so far with no issues   Patient was again reminded to attend to ADLs skills and take showers at least every other day and attend more groups to keep up with her 25% expectation   She wanted to go back to the [facility] in [address] [place] [address], but [address] told her she can only go back to the [facility] which is in [place]   Current medications:    Current Facility-Administered Medications:     acetaminophen (TYLENOL) tablet 650 mg, 650 mg, Oral, Q6H PRN, Zac Sierra MD    albuterol (PROVENTIL HFA,VENTOLIN HFA) inhaler 2 puff, 2 puff, Inhalation, Q4H PRN, Zac Sierra MD, 2 puff at 10/11/19 0424    aluminum-magnesium hydroxide-simethicone (MYLANTA) 200-200-20 mg/5 mL oral suspension 15 mL, 15 mL, Oral, Q4H PRN, Dalton Garner MD    ammonium lactate (LAC-HYDRIN) 12 % lotion 1 application, 1 application, Topical, BID PRN, Dalton Garner MD    benzonatate (TESSALON PERLES) capsule 100 mg, 100 mg, Oral, TID PRN, Dalton Garner MD    benztropine (COGENTIN) injection 1 mg, 1 mg, Intramuscular, Q8H PRN, Dalton Garner MD    cloZAPine (CLOZARIL) tablet 25 mg, 25 mg, Oral, BID, Dalton Garner MD, 25 mg at 11/03/19 2115    cloZAPine (CLOZARIL) tablet 50 mg, 50 mg, Oral, BID, Dalton Garner MD, 50 mg at 11/03/19 2114    EPINEPHrine PF (ADRENALIN) 1 mg/mL injection 0 15 mg, 0 15 mg, Intramuscular, Once PRN, Dalton Garner MD    FLUoxetine (PROzac) capsule 30 mg, 30 mg, Oral, Daily, Dalton Garner MD, 30 mg at 11/04/19 0857    fluticasone-vilanterol (BREO ELLIPTA) 200-25 MCG/INH inhaler 1 puff, 1 puff, Inhalation, Daily, Dalton Garner MD, 1 puff at 11/04/19 0857    ketotifen (ZADITOR) 0 025 % ophthalmic solution 1 drop, 1 drop, Right Eye, BID PRN, Dalton Garner MD    levothyroxine tablet 125 mcg, 125 mcg, Oral, Early Morning, Dalton Garner MD, 125 mcg at 11/04/19 0609    magnesium hydroxide (MILK OF MAGNESIA) 400 mg/5 mL oral suspension 30 mL, 30 mL, Oral, Daily PRN, Dalton Garner MD    montelukast (SINGULAIR) tablet 10 mg, 10 mg, Oral, HS, Dalton Garner MD, 10 mg at 11/03/19 2114    OLANZapine (ZyPREXA) IM injection 5 mg, 5 mg, Intramuscular, Q8H PRN, Dalton Garner MD    OLANZapine (ZyPREXA) tablet 5 mg, 5 mg, Oral, Q8H PRN, Dalton Garner MD    ondansetron (ZOFRAN-ODT) dispersible tablet 4 mg, 4 mg, Oral, Q6H PRN, Dalton Garner MD    pantoprazole (PROTONIX) EC tablet 40 mg, 40 mg, Oral, Early Morning, Dalton Garner MD, 40 mg at 11/04/19 0609    polyethylene glycol (MIRALAX) packet 17 g, 17 g, Oral, Daily PRN, Dalton Garner MD    polyvinyl alcohol (LIQUIFILM TEARS) 1 4 % ophthalmic solution 1 drop, 1 drop, Both Eyes, Q3H PRN, Dalton Garner MD    theophylline (JEF-24) 24 hr capsule 200 mg, 200 mg, Oral, Daily, Mynor Terry MD, 200 mg at 11/04/19 0857    tiotropium [facility]) capsule for inhaler 18 mcg, 18 mcg, Inhalation, Daily, Mynor Terry MD, 18 mcg at 11/04/19 0858    traZODone (DESYREL) tablet 25 mg, 25 mg, Oral, HS PRN, Mynor Terry MD  Justification if on more than two antipsychotics:  Only on his clozapine  Side effects if any:  None    Risks , benefits, side effects and precautions of medications discussed with patient and reminded patient to let us know any problems with medications     Objective:     Vital Signs:  Vitals:    11/03/19 1956 11/03/19 2142 11/04/19 0700 11/04/19 0705   BP: 104/64  103/67 106/59   BP Location: Left arm  Left arm Left arm   Pulse: 101 96 80 72   Resp: 16  18 18   Temp: 97 6 °F (36 4 °C)  97 9 °F (36 6 °C)    TempSrc: Temporal  Temporal    SpO2: 92% 94% 97%    Weight:       Height:         Appearance:  age appropriate, casually dressed and overweight older than stated age, uncombed long grey hair, poorly groomed disheveled unkempt with a body odor   Behavior:  evasive and guarded and suspicious but with good eye contact and preoccupied   Speech:  normal pitch and normal volume but tends to become loud at times and circumstantial and tangential   Mood:  anxious and dysthymic   Affect:  mood-congruent, elated and entitled   Thought Process:  goal directed and illogical slightly pressured but able to be redirected and tangential and talks as if in a court of law being stilted which has not changed   Thought Content:  delusions  grandiose and somatic type about not being able to breathe despite being on nasal oxygen and paranoid about people out to harm her and Atrovent inhaler tainteded with peanut oil  No current suicidal homicidal thoughts intent or plans reported  No phobias obsessions or compulsions reported    Somatic delusions about having cancer or terrible illnesses like cancer or having a micro chip on her hand and the therapist stealing her lover from her   Perceptual Disturbances: None and does not appear responding to internal stimuli   Risk Potential: Tendency to not care for herself if she goes off treatment   Sensorium:  person, place, time/date, situation, day of week, month of year and time   Cognition:  grossly intact   Consciousness:  alert and awake    Attention: Intact concentration and attention span   Intellect: Considered to be at least of average intelligence   Insight:  limited in denial   Judgment: poor      Motor Activity: no abnormal movements         Recent Labs:  Results Reviewed     None          I/O Past 24 hours:  No intake/output data recorded  No intake/output data recorded  Assessment / Plan:     Schizoaffective disorder[facility] ([facility]      Reason for continued inpatient care:  Because of underlying paranoia and refusal to go back to the personal care home and inability to care for herself on her own  Acceptance by patient:  Accepting  Robert Yadav in recovery:  About living in another personal care home once she feels better  Understanding of medications :  Has some understanding  Involved in reintegration process:  Adjusting to the unit  Trusting in relatoinship with psychiatrist:  Trusting    Recommended Treatment:    Medication changes:  1) no changes today  Non-pharmacological treatments  1) continue with  individual therapy group therapy, milieu therapy and occupational therapy and milieu therapy involving multidisciplinary team approach with psychotherapist, case management, nursing, peer support services, art therapist etc using recovery principles and psycho-education about accepting illness and need for treatment   3) encourage to attend to ADLs like taking showers and wearing clean clothes   4) Encourage to attend groups   Safety  1) Safety/communication plan established targeting dynamic risk factors above    Discharge Plan most likely back to the a:  Personal care boarding home with act services once her delusions are managed and mood becomes more stabilized and she becomes more receptive to treatment compliance    Counseling / Coordination of Care    Total floor / unit time spent today 15 minutes  Greater than 50% of total time was spent with the patient and / or family counseling and / or coordination of care  A description of the counseling / coordination of care  Patient's Rights, confidentiality and exceptions to confidentiality, use of automated medical record, J[facility] Patrick staff access to medical record, and consent to treatment reviewed      Chichi Lockett MD

## 2019-11-05 NOTE — PROGRESS NOTES
Progress Note - Selina Simon 1955, 60 y o  female MRN: [id number]    [facility] Encounter: [id number]    Primary Care Provider: Judith Morrissey PA-C   Date and time admitted to hospital: 7/23/2019  5:30 PM        * Schizoaffective disorder, bipolar type [facility])  Assessment & Plan  Psychiatry Progress Note    Patient again found laying back in bed without taking any showers in the last 2 days after taking the lash out on Saturday  He still has a complains about feeling sad and depressed and withdrawn and claims Prozac is not working for her  She continues to have psychosomatic symptoms about not being able to breathe or not able to swallow etc which have not changed  She is still suspicious about certain staff who gives her medications like her inhalers and the spirometer  and continues to blame the staff for withholding information as she thinks she may have cancer  She does not admit to any overt hallucinations or paranoia but still appears to harbor some covert  paranoia based on her demeanor and interaction on the unit   She again verbalizes her suspicion that that there is a micro chip on her right forearm and still accuses the therapist of stealing her lover who is supposedly a medical doctor here which has not changed either  No current suicidal homicidal thoughts intent or plans reported  No overt hallucinations or other delusions reported   No signs or sxs of agranulocytosis or myocarditis or endocarditis and she is moving her bowels so far with no issues   Patient was again reminded to attend to ADLs skills and take showers at least every other day and attend more groups to keep up with her 25% expectation   She wanted to go back to the [facility] in [address] [place] [address], but [facility] told her she can only go back to the [facility] which is in [place] and she was told that she needs to show improvement with her ADLs before we can consider that   Current medications:    Current Facility-Administered Medications:     acetaminophen (TYLENOL) tablet 650 mg, 650 mg, Oral, Q6H PRN, Alirio Frazier MD    albuterol (PROVENTIL HFA,VENTOLIN HFA) inhaler 2 puff, 2 puff, Inhalation, Q4H PRN, Alirio Frazier MD, 2 puff at 10/11/19 0424    aluminum-magnesium hydroxide-simethicone (MYLANTA) 200-200-20 mg/5 mL oral suspension 15 mL, 15 mL, Oral, Q4H PRN, Alirio Frazier MD    ammonium lactate (LAC-HYDRIN) 12 % lotion 1 application, 1 application, Topical, BID PRN, Alirio Frazier MD    benzonatate (TESSALON PERLES) capsule 100 mg, 100 mg, Oral, TID PRN, Alirio Frazier MD    benztropine (COGENTIN) injection 1 mg, 1 mg, Intramuscular, Q8H PRN, Alirio Frazier MD    cloZAPine (CLOZARIL) tablet 25 mg, 25 mg, Oral, BID, Alirio Frazier MD, 25 mg at 11/04/19 2127    cloZAPine (CLOZARIL) tablet 50 mg, 50 mg, Oral, BID, Alirio Frazier MD, 50 mg at 11/04/19 2127    EPINEPHrine PF (ADRENALIN) 1 mg/mL injection 0 15 mg, 0 15 mg, Intramuscular, Once PRN, Alirio Frazier MD    fluticasone-vilanterol (BREO ELLIPTA) 200-25 MCG/INH inhaler 1 puff, 1 puff, Inhalation, Daily, Alirio Frazier MD, 1 puff at 11/05/19 0847    ketotifen (ZADITOR) 0 025 % ophthalmic solution 1 drop, 1 drop, Right Eye, BID PRN, Alirio Frazier MD    levothyroxine tablet 125 mcg, 125 mcg, Oral, Early Morning, Alirio Frazier MD, 125 mcg at 11/05/19 0550    magnesium hydroxide (MILK OF MAGNESIA) 400 mg/5 mL oral suspension 30 mL, 30 mL, Oral, Daily PRN, Alirio Frazier MD    montelukast (SINGULAIR) tablet 10 mg, 10 mg, Oral, HS, Alirio Frazier MD, 10 mg at 11/04/19 2127    OLANZapine (ZyPREXA) IM injection 5 mg, 5 mg, Intramuscular, Q8H PRN, Alirio Frazier MD    OLANZapine (ZyPREXA) tablet 5 mg, 5 mg, Oral, Q8H PRN, Alirio Frazier MD    ondansetron (ZOFRAN-ODT) dispersible tablet 4 mg, 4 mg, Oral, Q6H PRN, Alirio Frazier MD    pantoprazole (PROTONIX) EC tablet 40 mg, 40 mg, Oral, Early Morning, Alirio Frazier MD, 40 mg at 11/05/19 0554    polyethylene glycol (MIRALAX) packet 17 g, 17 g, Oral, Daily PRN, Zander Yanez MD    polyvinyl alcohol (LIQUIFILM TEARS) 1 4 % ophthalmic solution 1 drop, 1 drop, Both Eyes, Q3H PRN, Zander Yanez MD  Alpa Nance  [START ON 11/6/2019] sertraline (ZOLOFT) tablet 50 mg, 50 mg, Oral, Daily, Zander Yanez MD    theophylline (JEF-24) 24 hr capsule 200 mg, 200 mg, Oral, Daily, Zander Yanez MD, 200 mg at 11/05/19 0847    tiotropium (SPIRIVA) capsule for inhaler 18 mcg, 18 mcg, Inhalation, Daily, Zander Yanez MD, 18 mcg at 11/05/19 0847    traZODone (DESYREL) tablet 25 mg, 25 mg, Oral, HS PRN, Zander Yanez MD  Justification if on more than two antipsychotics:  Only on his clozapine  Side effects if any:  None    Risks , benefits, side effects and precautions of medications discussed with patient and reminded patient to let us know any problems with medications     Objective:     Vital Signs:  Vitals:    11/04/19 1900 11/04/19 2130 11/05/19 0315 11/05/19 0730   BP: 96/60   105/68   BP Location: Left arm   Left arm   Pulse: 63   82   Resp: 15   18   Temp: 97 8 °F (36 6 °C)   98 7 °F (37 1 °C)   TempSrc: Temporal   Temporal   SpO2: 97% 93% 96%    Weight:       Height:         Appearance:  age appropriate, casually dressed and overweight older than stated age, uncombed long grey hair, poorly groomed disheveled unkempt with a body odor and laying in bed   Behavior:  evasive and guarded and suspicious but with good eye contact and preoccupied    Speech:  normal pitch and normal volume but tends to become loud at times and circumstantial    Mood:  anxious and dysthymic   Affect:  mood-congruent, dysphoric   Thought Process:  goal directed and illogical slightly pressured but able to be redirected and tangential and talks as if in a court of law being stilted which has not changed   Thought Content:  delusions ofsomatic type about not being able to breathe despite being on nasal oxygen and paranoid about people out to harm her and Atrovent inhaler tainteded with peanut oil  No current suicidal homicidal thoughts intent or plans reported  No phobias obsessions or compulsions reported  Also with other somatic delusions about having cancer and having a micro chip on her hand where there is a lipoma and the therapist stealing her lover from her   Perceptual Disturbances: None and does not appear responding to internal stimuli   Risk Potential: Tendency to not care for herself if she goes off treatment   Sensorium:  person, place, time/date, situation, day of week, month of year and time   Cognition:  grossly intact   Consciousness:  alert and awake    Attention: Intact concentration and attention span   Intellect: Considered to be at least of average intelligence   Insight:  limited in denial   Judgment: poor      Motor Activity: no abnormal movements         Recent Labs:  Results Reviewed     None          I/O Past 24 hours:  No intake/output data recorded  No intake/output data recorded          Assessment / Plan:     [facility]      Reason for continued inpatient care:  Because of underlying paranoia and refusal to go back to the personal care home and inability to care for herself on her own  Acceptance by patient:  Accepting  Nithin Serra in recovery:  About living in another personal care home once she feels better  Understanding of medications :  Has some understanding  Involved in reintegration process:  Adjusting to the unit  Trusting in relatoinship with psychiatrist:  Trusting    Recommended Treatment:    Medication changes:  1) discontinue Prozac and start Zoloft 50 mg once a day starting tomorrow and she does not like being on Prozac and complains about feeling depressed  Non-pharmacological treatments  1) continue with  individual therapy group therapy, milieu therapy and occupational therapy and milieu therapy involving multidisciplinary team approach with psychotherapist, case management, nursing, peer support services, art therapist etc using recovery principles and psycho-education about accepting illness and need for treatment   3) encourage to attend to ADLs like taking showers and wearing clean clothes   4) Encourage to attend groups   Safety  1) Safety/communication plan established targeting dynamic risk factors above  Discharge Plan most likely back to the a:  Personal care boarding home with act services once her delusions are managed and mood becomes more stabilized and she becomes more receptive to treatment compliance    Counseling / Coordination of Care    Total floor / unit time spent today 15 minutes  Greater than 50% of total time was spent with the patient and / or family counseling and / or coordination of care  A description of the counseling / coordination of care  Patient's Rights, confidentiality and exceptions to confidentiality, use of automated medical record, [facility] staff access to medical record, and consent to treatment reviewed      Amina Aparicio MD

## 2019-11-05 NOTE — ASSESSMENT & PLAN NOTE
Psychiatry Progress Note    Patient again found laying back in bed without taking any showers in the last 2 days after taking the lash out on Saturday  He still has a complains about feeling sad and depressed and withdrawn and claims Prozac is not working for her  She continues to have psychosomatic symptoms about not being able to breathe or not able to swallow etc which have not changed  She is still suspicious about certain staff who gives her medications like her inhalers and the spirometer  and continues to blame the staff for withholding information as she thinks she may have cancer  She does not admit to any overt hallucinations or paranoia but still appears to harbor some covert  paranoia based on her demeanor and interaction on the unit   She again verbalizes her suspicion that that there is a micro chip on her right forearm and still accuses the therapist of stealing her lover who is supposedly a medical doctor here which has not changed either  No current suicidal homicidal thoughts intent or plans reported  No overt hallucinations or other delusions reported   No signs or sxs of agranulocytosis or myocarditis or endocarditis and she is moving her bowels so far with no issues   Patient was again reminded to attend to ADLs skills and take showers at least every other day and attend more groups to keep up with her 25% expectation   She [facility] to [facility] in [address] [place] [address], but [address] told her she can only go back to the [facility] which is in [place] and she was told that she needs to show improvement with her ADLs before we can consider that   Current medications:    Current Facility-Administered Medications:     acetaminophen (TYLENOL) tablet 650 mg, 650 mg, Oral, Q6H PRN, Mynor Terry MD    albuterol (PROVENTIL HFA,VENTOLIN HFA) inhaler 2 puff, 2 puff, Inhalation, Q4H PRN, Mynor Terry MD, 2 puff at 10/11/19 0424    aluminum-magnesium hydroxide-simethicone (MYLANTA) 200-200-20 mg/5 mL oral suspension 15 mL, 15 mL, Oral, Q4H PRN, Shaggy Rangel MD    ammonium lactate (LAC-HYDRIN) 12 % lotion 1 application, 1 application, Topical, BID PRN, Shaggy Rangel MD    benzonatate (TESSALON PERLES) capsule 100 mg, 100 mg, Oral, TID PRN, Shaggy Rangel MD    benztropine (COGENTIN) injection 1 mg, 1 mg, Intramuscular, Q8H PRN, Shaggy Rangel MD    cloZAPine (CLOZARIL) tablet 25 mg, 25 mg, Oral, BID, Shaggy Rangel MD, 25 mg at 11/04/19 2127    cloZAPine (CLOZARIL) tablet 50 mg, 50 mg, Oral, BID, Shaggy Rangel MD, 50 mg at 11/04/19 2127    EPINEPHrine PF (ADRENALIN) 1 mg/mL injection 0 15 mg, 0 15 mg, Intramuscular, Once PRN, Shaggy Rangel MD    fluticasone-vilanterol (BREO ELLIPTA) 200-25 MCG/INH inhaler 1 puff, 1 puff, Inhalation, Daily, Shaggy Rangel MD, 1 puff at 11/05/19 0847    ketotifen (ZADITOR) 0 025 % ophthalmic solution 1 drop, 1 drop, Right Eye, BID PRN, Shaggy Rangel MD    levothyroxine tablet 125 mcg, 125 mcg, Oral, Early Morning, Shaggy Rangel MD, 125 mcg at 11/05/19 0550    magnesium hydroxide (MILK OF MAGNESIA) 400 mg/5 mL oral suspension 30 mL, 30 mL, Oral, Daily PRN, Shaggy Rangel MD    montelukast (SINGULAIR) tablet 10 mg, 10 mg, Oral, HS, Shaggy Rangel MD, 10 mg at 11/04/19 2127    OLANZapine (ZyPREXA) IM injection 5 mg, 5 mg, Intramuscular, Q8H PRN, Shaggy Rangel MD    OLANZapine (ZyPREXA) tablet 5 mg, 5 mg, Oral, Q8H PRN, Shaggy Rangel MD    ondansetron (ZOFRAN-ODT) dispersible tablet 4 mg, 4 mg, Oral, Q6H PRN, Shaggy Rangel MD    pantoprazole (PROTONIX) EC tablet 40 mg, 40 mg, Oral, Early Morning, Shaggy Rangel MD, 40 mg at 11/05/19 0550    polyethylene glycol (MIRALAX) packet 17 g, 17 g, Oral, Daily PRN, Shaggy Rangel MD    polyvinyl alcohol (LIQUIFILM TEARS) 1 4 % ophthalmic solution 1 drop, 1 drop, Both Eyes, Q3H PRN, MD Danielito Cohn  [START ON 11/6/2019] sertraline (ZOLOFT) tablet 50 mg, 50 mg, Oral, Daily, MD Danielito Cohn theophylline (JEF-24) 24 hr capsule 200 mg, 200 mg, Oral, Daily, Chichi Lockett MD, 200 mg at 11/05/19 0847    tiotropium (SPIRIVA) capsule for inhaler 18 mcg, 18 mcg, Inhalation, Daily, Chichi Lockett MD, 18 mcg at 11/05/19 0847    traZODone (DESYREL) tablet 25 mg, 25 mg, Oral, HS PRN, Chichi Lockett MD  Justification if on more than two antipsychotics:  Only on his clozapine  Side effects if any:  None    Risks , benefits, side effects and precautions of medications discussed with patient and reminded patient to let us know any problems with medications     Objective:     Vital Signs:  Vitals:    11/04/19 1900 11/04/19 2130 11/05/19 0315 11/05/19 0730   BP: 96/60   105/68   BP Location: Left arm   Left arm   Pulse: 63   82   Resp: 15   18   Temp: 97 8 °F (36 6 °C)   98 7 °F (37 1 °C)   TempSrc: Temporal   Temporal   SpO2: 97% 93% 96%    Weight:       Height:         Appearance:  age appropriate, casually dressed and overweight older than stated age, uncombed long grey hair, poorly groomed disheveled unkempt with a body odor   Behavior:  evasive and guarded and suspicious but with good eye contact and preoccupied   Speech:  normal pitch and normal volume but tends to become loud at times and circumstantial and tangential   Mood:  anxious and dysthymic   Affect:  mood-congruent, elated and entitled   Thought Process:  goal directed and illogical slightly pressured but able to be redirected and tangential and talks as if in a court of law being stilted which has not changed   Thought Content:  delusions  grandiose and somatic type about not being able to breathe despite being on nasal oxygen and paranoid about people out to harm her and Atrovent inhaler tainteded with peanut oil  No current suicidal homicidal thoughts intent or plans reported  No phobias obsessions or compulsions reported    Somatic delusions about having cancer or terrible illnesses like cancer or having a micro chip on her hand and the therapist stealing her lover from her   Perceptual Disturbances: None and does not appear responding to internal stimuli   Risk Potential: Tendency to not care for herself if she goes off treatment   Sensorium:  person, place, time/date, situation, day of week, month of year and time   Cognition:  grossly intact   Consciousness:  alert and awake    Attention: Intact concentration and attention span   Intellect: Considered to be at least of average intelligence   Insight:  limited in denial   Judgment: poor      Motor Activity: no abnormal movements         Recent Labs:  Results Reviewed     None          I/O Past 24 hours:  No intake/output data recorded  No intake/output data recorded  Assessment / Plan:     Schizoaffective disorder, [facility] ([facility]      Reason for continued inpatient care:  Because of underlying paranoia and refusal to go back to the personal care home and inability to care for herself on her own  Acceptance by patient:  Accepting  Clara Arredondo in recovery:  About living in another personal care home once she feels better  Understanding of medications :  Has some understanding  Involved in reintegration process:  Adjusting to the unit  Trusting in relatoinship with psychiatrist:  Trusting    Recommended Treatment:    Medication changes:  1) no changes today  Non-pharmacological treatments  1) continue with  individual therapy group therapy, milieu therapy and occupational therapy and milieu therapy involving multidisciplinary team approach with psychotherapist, case management, nursing, peer support services, art therapist etc using recovery principles and psycho-education about accepting illness and need for treatment   3) encourage to attend to ADLs like taking showers and wearing clean clothes   4) Encourage to attend groups   Safety  1) Safety/communication plan established targeting dynamic risk factors above    Discharge Plan most likely back to the a:  Personal care boarding home with act services once her delusions are managed and mood becomes more stabilized and she becomes more receptive to treatment compliance    Counseling / Coordination of Care    Total floor / unit time spent today 15 minutes  Greater than 50% of total time was spent with the patient and / or family counseling and / or coordination of care  A description of the counseling / coordination of care  Patient's Rights, confidentiality and exceptions to confidentiality, use of automated medical record, Jeb Patrick staff access to medical record, and consent to treatment reviewed      Meredith Wesley MD

## 2019-11-05 NOTE — PROGRESS NOTES
11/05/19 0900   Team Meeting   Meeting Type Daily Rounds   Team Members Present   Team Members Present Physician;Nurse;   Physician Team Member Dr Aaron Curtis Team Member Radha Nance, RN   Care Management Team Member Brenda Cancino     Not attending any groups  Isolative to her room  Blaming medications for depression  Last shower was 11/02/19  Slept

## 2019-11-05 NOTE — PLAN OF CARE
Problem: Alteration in Thoughts and Perception  Goal: Verbalize thoughts and feelings  Description  Interventions:  - Promote a nonjudgmental and trusting relationship with the patient through active listening and therapeutic communication  - Assess patient's level of functioning, behavior and potential for risk  - Engage patient in 1 on 1 interactions for a minimum of 15 minutes each session  - Encourage patient to express fears, feelings, frustrations, and discuss symptoms    - [place] patient to reality, help patient recognize reality-based thinking   - Administer medications as ordered and assess for potential side effects  - Provide the patient education related to the signs and symptoms of the illness and desired effects of prescribed medications  Outcome: Progressing  Goal: Agree to be compliant with medication regime, as prescribed and report medication side effects  Description  Interventions:  - Offer appropriate PRN medication and supervise ingestion; conduct aims, as needed   Outcome: Progressing     Problem: RESPIRATORY - ADULT  Goal: Achieves optimal ventilation and oxygenation  Description  INTERVENTIONS:  - Assess for changes in respiratory status  - Assess for changes in mentation and behavior  - Position to facilitate oxygenation and minimize respiratory effort  - Oxygen administration by appropriate delivery method based on oxygen saturation (per order) or ABGs  - Initiate smoking cessation education as indicated  - Encourage broncho-pulmonary hygiene including cough, deep breathe, Incentive Spirometry  - Assess the need for suctioning and aspirate as needed  - Assess and instruct to report SOB or any respiratory difficulty  - Respiratory Therapy support as indicated  Outcome: Progressing     Problem: Alteration in Thoughts and Perception  Goal: Attend and participate in unit activities, including therapeutic, recreational, and educational groups  Description  Interventions:  - Provide therapeutic and educational activities daily, encourage attendance and participation, and document same in the medical record     CERTIFIED PEER SPECIALIST INTERVENTIONS:    Complete peer assessment with patient to assess their needs and identify their goals to complete while in the recovery program as well as once discharged into the community  Patient will complete WRAP Plan, Crisis Plan and 5 Life Domains  Patient will attend 50% of groups offered on the unit  Patient will complete a goal card weekly  Outcome: Not Progressing     Problem: Depression  Goal: Refrain from isolation  Description  Interventions:  - Develop a trusting relationship   - Encourage socialization   Outcome: Not Progressing     [address] is isolative to her room & bed  Has only come out for meal (ate 10% plus an Ensure), HS snack (2 yogurts), scheduled medicines  Continues to question if the Singulair is making her depressed, or, if it interacts w/the Clozaril to make her depressed  "I'm not usually depressed " Told to ask the doctor these questions  Did use the Incentive Spirometer, reaching volumes of 1000-1100ml  Wearing her QHS humidified nasal O2 @ 1L now for bed  Continues passive, in the invalid role, not taking initiative to help self unless it involves a somatic issue  Did not attend PM Group

## 2019-11-05 NOTE — PROGRESS NOTES
Sleeping with no s/s of distress  O2 at 1L/NC   Fall and safe precautions maintained and monitoring continues

## 2019-11-05 NOTE — PROGRESS NOTES
Patient not require to attend      11/05/19 1430   Activity/Group Checklist   Group   (Community Pragramming Wrap Up )   Attendance Did not attend   Attendance Duration (min) 16-30   Affect/Mood IRMA

## 2019-11-05 NOTE — PLAN OF CARE
Problem: Alteration in Thoughts and Perception  Goal: Verbalize thoughts and feelings  Description  Interventions:  - Promote a nonjudgmental and trusting relationship with the patient through active listening and therapeutic communication  - Assess patient's level of functioning, behavior and potential for risk  - Engage patient in 1 on 1 interactions for a minimum of 15 minutes each session  - Encourage patient to express fears, feelings, frustrations, and discuss symptoms    - [place] patient to reality, help patient recognize reality-based thinking   - Administer medications as ordered and assess for potential side effects  - Provide the patient education related to the signs and symptoms of the illness and desired effects of prescribed medications  Outcome: Progressing  Goal: Agree to be compliant with medication regime, as prescribed and report medication side effects  Description  Interventions:  - Offer appropriate PRN medication and supervise ingestion; conduct aims, as needed   Outcome: Progressing  Goal: Attend and participate in unit activities, including therapeutic, recreational, and educational groups  Description  Interventions:  - Provide therapeutic and educational activities daily, encourage attendance and participation, and document same in the medical record     CERTIFIED PEER SPECIALIST INTERVENTIONS:    Complete peer assessment with patient to assess their needs and identify their goals to complete while in the recovery program as well as once discharged into the community  Patient will complete WRAP Plan, Crisis Plan and 5 Life Domains  Patient will attend 50% of groups offered on the unit  Patient will complete a goal card weekly  Outcome: Progressing     Problem: Anxiety  Goal: Anxiety is at manageable level  Description  Interventions:  - Assess and monitor patient's anxiety level     - Monitor for signs and symptoms of anxiety both physical and emotional (heart palpitations, chest pain, shortness of breath, headaches, nausea, feeling jumpy, restlessness, irritable, apprehensive)  - Collaborate with interdisciplinary team and initiate plan and interventions as ordered  - [place] patient to unit/surroundings  - Explain treatment plan  - Encourage participation in care  - Encourage verbalization of concerns/fears  - Identify coping mechanisms  - Assist in developing anxiety-reducing skills  - Administer/offer alternative therapies  - Limit or eliminate stimulants  Outcome: Keara Huber has been intermittently visible on the unit, gait remains steady, med and meal  Compliant with medications and meals, using her ensure for lunch as a supplement and ate 100% for breakfast  Anxious about med change, this writer educated her about the switch between prozac and zoloft which seemed to appear to ease her anxiety  She remains somatically preoccupied about her blood sugars, blood pressure and other lab values  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

## 2019-11-05 NOTE — PROGRESS NOTES
Awake at this time sitting by the phone, c/o trouble breathing, no signs of distress noted, POX 96% on RA   Went back to bed as soon as POX taken

## 2019-11-05 NOTE — PLAN OF CARE
Problem: Alteration in Thoughts and Perception  Goal: Attend and participate in unit activities, including therapeutic, recreational, and educational groups  Description  Interventions:  - Provide therapeutic and educational activities daily, encourage attendance and participation, and document same in the medical record     CERTIFIED PEER SPECIALIST INTERVENTIONS:    Complete peer assessment with patient to assess their needs and identify their goals to complete while in the recovery program as well as once discharged into the community  Patient will complete WRAP Plan, Crisis Plan and 5 Life Domains  Patient will attend 50% of groups offered on the unit  Patient will complete a goal card weekly  Outcome: Not Progressing  Patient group percentage remains low at 10%  Pt does not attend and participate in groups or unit activities

## 2019-11-06 NOTE — PLAN OF CARE
Problem: Alteration in Thoughts and Perception  Goal: Verbalize thoughts and feelings  Description  Interventions:  - Promote a nonjudgmental and trusting relationship with the patient through active listening and therapeutic communication  - Assess patient's level of functioning, behavior and potential for risk  - Engage patient in 1 on 1 interactions for a minimum of 15 minutes each session  - Encourage patient to express fears, feelings, frustrations, and discuss symptoms    - [place] patient to reality, help patient recognize reality-based thinking   - Administer medications as ordered and assess for potential side effects  - Provide the patient education related to the signs and symptoms of the illness and desired effects of prescribed medications  Outcome: Progressing  Goal: Agree to be compliant with medication regime, as prescribed and report medication side effects  Description  Interventions:  - Offer appropriate PRN medication and supervise ingestion; conduct aims, as needed   Outcome: Progressing     Problem: Alteration in Thoughts and Perception  Goal: Attend and participate in unit activities, including therapeutic, recreational, and educational groups  Description  Interventions:  - Provide therapeutic and educational activities daily, encourage attendance and participation, and document same in the medical record     CERTIFIED PEER SPECIALIST INTERVENTIONS:    Complete peer assessment with patient to assess their needs and identify their goals to complete while in the recovery program as well as once discharged into the community  Patient will complete WRAP Plan, Crisis Plan and 5 Life Domains  Patient will attend 50% of groups offered on the unit  Patient will complete a goal card weekly      Outcome: Not Progressing  Goal: Recognize dysfunctional thoughts, communicate reality-based thoughts at the time of discharge  Description  Interventions:  - Provide medication and psycho-education to assist patient in compliance and developing insight into his/her illness   Outcome: Not Progressing  Goal: Complete daily ADLs, including personal hygiene independently, as able  Description  Interventions:  - Observe, teach, and assist patient with ADLS  - Monitor and promote a balance of rest/activity, with adequate nutrition and elimination   Outcome: Not Progressing     Problem: Ineffective Coping  Goal: Demonstrates healthy coping skills  Outcome: Not Progressing     Problem: Depression  Goal: Refrain from isolation  Description  Interventions:  - Develop a trusting relationship   - Encourage socialization   Outcome: Not Progressing     Problem: RESPIRATORY - ADULT  Goal: Achieves optimal ventilation and oxygenation  Description  INTERVENTIONS:  - Assess for changes in respiratory status  - Assess for changes in mentation and behavior  - Position to facilitate oxygenation and minimize respiratory effort  - Oxygen administration by appropriate delivery method based on oxygen saturation (per order) or ABGs  - Initiate smoking cessation education as indicated  - Encourage broncho-pulmonary hygiene including cough, deep breathe, Incentive Spirometry  - Assess the need for suctioning and aspirate as needed  - Assess and instruct to report SOB or any respiratory difficulty  - Respiratory Therapy support as indicated  Outcome: Not Progressing     [address] is worrisome, somatic  Questioning if any "peanut extracts" in the Zoloft as fears anaphylaxis  Worries about dizziness, appetite issues, blood sugars  She had Ensure along w/arguably 10% vs 100% of meal ( in her estimate she ate nearly nothing)  Did have an HS snack  Volumes poorer using the Incentive Spirometer tonight; 750-900ml mostly  Isolates in room in bed w/brief trips out to chair @ phone to air these somatic concerns  Is not attending to any hygiene  Is not socializing, no attendance @ either Recovery Workshop or PM Group   Has come up on own for scheduled medicine  Is wearing now for bed her QHS humidified nasal O2 @ 1L

## 2019-11-06 NOTE — ASSESSMENT & PLAN NOTE
Psychiatry Progress Note    Patient again found laying back in bed and has not attended any groups yesterday and has not taken any  any showers in last 3 days days after taking 1 last Saturday  She has started taking the Zoloft and claims that she is still depressed but she is able to sit up and talk with me when approached in her room  She continues to have psychosomatic symptoms about not being able to breathe or not able to swallow etc which have not changed and has multiple excuses for not taking showers or going to groups  She is still suspicious about certain staff who gives her medications like her inhalers and the spirometer  and continues to blame the staff for withholding information as she thinks she may have cancer  She does not admit to any overt hallucinations or paranoia but still appears to harbor some covert  paranoia based on her demeanor and interaction on the unit   She continues to believe that that there is a micro chip on her right forearm and still accuses the therapist of stealing her lover who is supposedly a medical doctor here   No current suicidal homicidal thoughts intent or plans reported  No overt hallucinations or other delusions reported   No signs or sxs of agranulocytosis or myocarditis or endocarditis and she is moving her bowels so far with no issues   Patient was again reminded to attend to ADLs skills and take showers at least every other day and attend more groups to keep up with her 25% expectation  She has now accepted the fact that she can no longer go back to the [facility] which is a personal care home that she was in before in [address] [place] [address], and that she can only go back to the [facility] which is in [place] and she was told that she needs to show improvement with her ADLs before we can consider that    She was encouraged to start attending at least 25 % of the groups when she has not been compliant with the behavior plan that was instituted as well Current medications:    Current Facility-Administered Medications:     acetaminophen (TYLENOL) tablet 650 mg, 650 mg, Oral, Q6H PRN, Krystyna Mcclain MD    albuterol (PROVENTIL HFA,VENTOLIN HFA) inhaler 2 puff, 2 puff, Inhalation, Q4H PRN, Krystyna Mcclain MD, 2 puff at 10/11/19 0424    aluminum-magnesium hydroxide-simethicone (MYLANTA) 200-200-20 mg/5 mL oral suspension 15 mL, 15 mL, Oral, Q4H PRN, Krystyna Mcclain MD    ammonium lactate (LAC-HYDRIN) 12 % lotion 1 application, 1 application, Topical, BID PRN, Krystyna Mcclain MD    benzonatate (TESSALON PERLES) capsule 100 mg, 100 mg, Oral, TID PRN, Krystyna Mcclain MD    benztropine (COGENTIN) injection 1 mg, 1 mg, Intramuscular, Q8H PRN, Krystyna Mcclain MD    cloZAPine (CLOZARIL) tablet 25 mg, 25 mg, Oral, BID, Krystyna Mcclain MD, 25 mg at 11/05/19 2124    cloZAPine (CLOZARIL) tablet 50 mg, 50 mg, Oral, BID, Krystyna Mcclain MD, 50 mg at 11/05/19 2124    EPINEPHrine PF (ADRENALIN) 1 mg/mL injection 0 15 mg, 0 15 mg, Intramuscular, Once PRN, Krystyna Mcclain MD    fluticasone-vilanterol (BREO ELLIPTA) 200-25 MCG/INH inhaler 1 puff, 1 puff, Inhalation, Daily, Krystyna Mcclain MD, 1 puff at 11/06/19 0857    ketotifen (ZADITOR) 0 025 % ophthalmic solution 1 drop, 1 drop, Right Eye, BID PRN, Krystyna Mcclain MD    levothyroxine tablet 125 mcg, 125 mcg, Oral, Early Morning, Krystyna Mcclain MD, 125 mcg at 11/06/19 0605    magnesium hydroxide (MILK OF MAGNESIA) 400 mg/5 mL oral suspension 30 mL, 30 mL, Oral, Daily PRN, Krystyna Mcclain MD    montelukast (SINGULAIR) tablet 10 mg, 10 mg, Oral, HS, Krystyna Mcclain MD, 10 mg at 11/05/19 2124    OLANZapine (ZyPREXA) IM injection 5 mg, 5 mg, Intramuscular, Q8H PRN, Krystyna Mcclain MD    OLANZapine (ZyPREXA) tablet 5 mg, 5 mg, Oral, Q8H PRN, Krystyna Mcclain MD    ondansetron (ZOFRAN-ODT) dispersible tablet 4 mg, 4 mg, Oral, Q6H PRN, Krystyna Mcclain MD    pantoprazole (PROTONIX) EC tablet 40 mg, 40 mg, Oral, Early Morning, Krystyna Mcclain MD, 40 mg at 11/06/19 6974   polyethylene glycol (MIRALAX) packet 17 g, 17 g, Oral, Daily PRN, Zander Yanez MD    polyvinyl alcohol (LIQUIFILM TEARS) 1 4 % ophthalmic solution 1 drop, 1 drop, Both Eyes, Q3H PRN, Zander Yanez MD    sertraline (ZOLOFT) tablet 50 mg, 50 mg, Oral, Daily, Zander Yanez MD, 50 mg at 11/06/19 0856    theophylline (JEF-24) 24 hr capsule 200 mg, 200 mg, Oral, Daily, Zander Yanez MD, 200 mg at 11/06/19 0856    tiotropium (SPIRIVA) capsule for inhaler 18 mcg, 18 mcg, Inhalation, Daily, Zander Yanez MD, 18 mcg at 11/06/19 0856    traZODone (DESYREL) tablet 25 mg, 25 mg, Oral, HS PRN, Zander Yanez MD  Justification if on more than two antipsychotics:  Only on his clozapine  Side effects if any:  None    Risks , benefits, side effects and precautions of medications discussed with patient and reminded patient to let us know any problems with medications     Objective:     Vital Signs:  Vitals:    11/05/19 1610 11/05/19 2005 11/05/19 2130 11/06/19 0730   BP: 105/63 90/55  101/70   BP Location: Right arm Left arm  Left arm   Pulse: 84 67  82   Resp: 18 18 19   Temp: 98 7 °F (37 1 °C) 98 4 °F (36 9 °C)  98 °F (36 7 °C)   TempSrc: Temporal Temporal  Temporal   SpO2: 97% 91% 92%    Weight:       Height:         Appearance:  age appropriate, casually dressed and overweight older than stated age, uncombed long grey hair, poorly groomed disheveled unkempt with a body odor again laying in bed   Behavior:  evasive and guarded and suspicious but with good eye contact and preoccupied and being passive-aggressive   Speech:  normal pitch and normal volume but circumstantial and tangential   Mood:  anxious and dysthymic   Affect:  mood-congruent, elated and entitled   Thought Process:  goal directed and illogical slightly pressured and tangential and talks as if in a court of law being stilted    Thought Content:  delusions  grandiose and somatic type about not being able to breathe despite being on nasal oxygen and paranoid about people out to harm her and Atrovent inhaler tainteded with peanut oil  No current suicidal homicidal thoughts intent or plans reported  No phobias obsessions or compulsions reported  Somatic delusions about having cancer or terrible illnesses like cancer or having a micro chip on her hand and the therapist stealing her lover from her  She believes that people are withholding information from her about some kind of terrible disease she thing she has  Perceptual Disturbances: None and does not appear responding to internal stimuli   Risk Potential: Tendency to not care for herself if she goes off treatment   Sensorium:  person, place, time/date, situation, day of week, month of year and time   Cognition:  grossly intact   Consciousness:  alert and awake    Attention: Intact concentration and attention span   Intellect: Considered to be at least of average intelligence   Insight:  limited in denial   Judgment: poor      Motor Activity: no abnormal movements         Recent Labs:  Results Reviewed     None          I/O Past 24 hours:  No intake/output data recorded  No intake/output data recorded          Assessment / Plan:     Schizoaffective disorder, bipolar type ([facility] )      Reason for continued inpatient care:  Because of underlying paranoia and refusal to go back to the personal care home and inability to care for herself on her own  Acceptance by patient:  Accepting  Arian Fitzpatrick in recovery:  About living in another personal care home once she feels better  Understanding of medications :  Has some understanding  Involved in reintegration process:  Adjusting to the unit  Trusting in relatoinship with psychiatrist:  Trusting    Recommended Treatment:    Medication changes:  1) no changes today since Zoloft was added and Prozac being discontinued  Non-pharmacological treatments  1) continue with  individual therapy group therapy, milieu therapy and occupational therapy and milieu therapy involving multidisciplinary team approach with psychotherapist, case management, nursing, peer support services, art therapist etc using recovery principles and psycho-education about accepting illness and need for treatment   3) encourage to attend to ADLs like taking showers and wearing clean clothes   4) Encourage to attend groups   Safety  1) Safety/communication plan established targeting dynamic risk factors above  Discharge Plan most likely back to the a:  Personal care boarding home with [facility] services once her delusions are managed and mood becomes more stabilized and she becomes more receptive to treatment compliance    Counseling / Coordination of Care    Total floor / unit time spent today 15 minutes  Greater than 50% of total time was spent with the patient and / or family counseling and / or coordination of care  A description of the counseling / coordination of care  Patient's Rights, confidentiality and exceptions to confidentiality, use of automated medical record, [address] [place] [address] staff access to medical record, and consent to treatment reviewed      Teri Huggins MD

## 2019-11-06 NOTE — PLAN OF CARE
Problem: Alteration in Thoughts and Perception  Goal: Verbalize thoughts and feelings  Description  Interventions:  - Promote a nonjudgmental and trusting relationship with the patient through active listening and therapeutic communication  - Assess patient's level of functioning, behavior and potential for risk  - Engage patient in 1 on 1 interactions for a minimum of 15 minutes each session  - Encourage patient to express fears, feelings, frustrations, and discuss symptoms    - [place] patient to reality, help patient recognize reality-based thinking   - Administer medications as ordered and assess for potential side effects  - Provide the patient education related to the signs and symptoms of the illness and desired effects of prescribed medications  Outcome: Progressing  Goal: Agree to be compliant with medication regime, as prescribed and report medication side effects  Description  Interventions:  - Offer appropriate PRN medication and supervise ingestion; conduct aims, as needed   Outcome: Progressing     Problem: Ineffective Coping  Goal: Patient/Family verbalizes awareness of resources  Outcome: Progressing  Goal: Understands least restrictive measures  Description  Interventions:  - Utilize least restrictive behavior  Outcome: Progressing     Problem: Risk for Self Injury/Neglect  Goal: Treatment Goal: Remain safe during length of stay, learn and adopt new coping skills, and be free of self-injurious ideation, impulses and acts at the time of discharge  Outcome: Progressing  Goal: Verbalize thoughts and feelings  Description  Interventions:  - Assess and re-assess patient's lethality and potential for self-injury  - Engage patient in 1:1 interactions, daily, for a minimum of 15 minutes  - Encourage patient to express feelings, fears, frustrations, hopes  - Establish rapport/trust with patient   Outcome: Progressing  Goal: Refrain from harming self  Description  Interventions:  - Monitor patient closely, per order  - Develop a trusting relationship  - Supervise medication ingestion, monitor effects and side effects   Outcome: Progressing     Problem: Depression  Goal: Verbalize thoughts and feelings  Description  Interventions:  - Assess and re-assess patient's level of risk   - Engage patient in 1:1 interactions, daily, for a minimum of 15 minutes   - Encourage patient to express feelings, fears, frustrations, hopes   Outcome: Progressing     Problem: Anxiety  Goal: Anxiety is at manageable level  Description  Interventions:  - Assess and monitor patient's anxiety level  - Monitor for signs and symptoms of anxiety both physical and emotional (heart palpitations, chest pain, shortness of breath, headaches, nausea, feeling jumpy, restlessness, irritable, apprehensive)  - Collaborate with interdisciplinary team and initiate plan and interventions as ordered    - [place] patient to unit/surroundings  - Explain treatment plan  - Encourage participation in care  - Encourage verbalization of concerns/fears  - Identify coping mechanisms  - Assist in developing anxiety-reducing skills  - Administer/offer alternative therapies  - Limit or eliminate stimulants  Outcome: Progressing     Problem: Alteration in Orientation  Goal: Interact with staff daily  Description  Interventions:  - Assess and re-assess patient's level of orientation  - Engage patient in 1 on 1 interactions, daily, for a minimum of 15 minutes   - Establish rapport/trust with patient   Outcome: Progressing     Problem: PAIN - ADULT  Goal: Verbalizes/displays adequate comfort level or baseline comfort level  Description  Interventions:  - Encourage patient to monitor pain and request assistance  - Assess pain using appropriate pain scale  - Administer analgesics based on type and severity of pain and evaluate response  - Implement non-pharmacological measures as appropriate and evaluate response  - Consider cultural and social influences on pain and pain management  - Notify physician/advanced practitioner if interventions unsuccessful or patient reports new pain  Outcome: Progressing     Problem: SAFETY ADULT  Goal: Patient will remain free of falls  Description  INTERVENTIONS:  - Assess patient frequently for physical needs  -  Identify cognitive and physical deficits and behaviors that affect risk of falls  -  [place] fall precautions as indicated by assessment   - Educate patient/family on patient safety including physical limitations  - Instruct patient to call for assistance with activity based on assessment  - Modify environment to reduce risk of injury  - Consider OT/PT consult to assist with strengthening/mobility  Outcome: Progressing     Problem: Nutrition/Hydration-ADULT  Goal: Nutrient/Hydration intake appropriate for improving, restoring or maintaining nutritional needs  Description  Monitor and assess patient's nutrition/hydration status for malnutrition  Collaborate with interdisciplinary team and initiate plan and interventions as ordered  Monitor patient's weight and dietary intake as ordered or per policy  Utilize nutrition screening tool and intervene as necessary  Determine patient's food preferences and provide high-protein, high-caloric foods as appropriate       INTERVENTIONS:  - Monitor oral intake, urinary output, labs, and treatment plans  - Assess nutrition and hydration status and recommend course of action  - Evaluate amount of meals eaten  - Assist patient with eating if necessary   - Allow adequate time for meals  - Recommend/ encourage appropriate diets, oral nutritional supplements, and vitamin/mineral supplements  - Order, calculate, and assess calorie counts as needed  - Recommend, monitor, and adjust tube feedings and TPN/PPN based on assessed needs  - Assess need for intravenous fluids  - Provide specific nutrition/hydration education as appropriate  - Include patient/family/caregiver in decisions related to nutrition  Outcome: Progressing     Problem: Alteration in Thoughts and Perception  Goal: Attend and participate in unit activities, including therapeutic, recreational, and educational groups  Description  Interventions:  - Provide therapeutic and educational activities daily, encourage attendance and participation, and document same in the medical record     CERTIFIED PEER SPECIALIST INTERVENTIONS:    Complete peer assessment with patient to assess their needs and identify their goals to complete while in the recovery program as well as once discharged into the community  Patient will complete WRAP Plan, Crisis Plan and [employer]  Patient will attend 50% of groups offered on the unit  Patient will complete a goal card weekly      Outcome: Not Progressing  Goal: Recognize dysfunctional thoughts, communicate reality-based thoughts at the time of discharge  Description  Interventions:  - Provide medication and psycho-education to assist patient in compliance and developing insight into his/her illness   Outcome: Not Progressing  Goal: Complete daily ADLs, including personal hygiene independently, as able  Description  Interventions:  - Observe, teach, and assist patient with ADLS  - Monitor and promote a balance of rest/activity, with adequate nutrition and elimination   Outcome: Not Progressing     Problem: Ineffective Coping  Goal: Identifies ineffective coping skills  Outcome: Not Progressing  Goal: Identifies healthy coping skills  Outcome: Not Progressing  Goal: Demonstrates healthy coping skills  Outcome: Not Progressing  Goal: Participates in unit activities  Description  Interventions:  - Provide therapeutic environment   - Provide required programming   - Redirect inappropriate behaviors   Outcome: Not Progressing     Problem: Risk for Self Injury/Neglect  Goal: Attend and participate in unit activities, including therapeutic, recreational, and educational groups  Description  Interventions:  - Provide therapeutic and educational activities daily, encourage attendance and participation, and document same in the medical record  - Obtain collateral information, encourage visitation and family involvement in care   Outcome: Not Progressing  Goal: Recognize maladaptive responses and adopt new coping mechanisms  Outcome: Not Progressing  Goal: Complete daily ADLs, including personal hygiene independently, as able  Description  Interventions:  - Observe, teach, and assist patient with ADLS  - Monitor and promote a balance of rest/activity, with adequate nutrition and elimination  Outcome: Not Progressing     Problem: Depression  Goal: Treatment Goal: Demonstrate behavioral control of depressive symptoms, verbalize feelings of improved mood/affect, and adopt new coping skills prior to discharge  Outcome: Not Progressing  Goal: Refrain from isolation  Description  Interventions:  - Develop a trusting relationship   - Encourage socialization   Outcome: Not Progressing  Goal: Refrain from self-neglect  Outcome: Not Progressing     Problem: RESPIRATORY - ADULT  Goal: Achieves optimal ventilation and oxygenation  Description  INTERVENTIONS:  - Assess for changes in respiratory status  - Assess for changes in mentation and behavior  - Position to facilitate oxygenation and minimize respiratory effort  - Oxygen administration by appropriate delivery method based on oxygen saturation (per order) or ABGs  - Initiate smoking cessation education as indicated  - Encourage broncho-pulmonary hygiene including cough, deep breathe, Incentive Spirometry  - Assess the need for suctioning and aspirate as needed  - Assess and instruct to report SOB or any respiratory difficulty  - Respiratory Therapy support as indicated  Outcome: Not Progressing   Isolative to her room with the exception of meds and meals  No groups attended   Non-compliant with staff recommendations to improve health and wellbeing  Refused to use incentive spirometer  Ate 50% of breakfast and 25% of lunch plus and ensure  No other behaviors or issues noted  Continue to monitor

## 2019-11-06 NOTE — PROGRESS NOTES
Progress Note - Chad Meyer 1955, 60 y o  female MRN: [id number]    [facility] Encounter: [id number]    Primary Care Provider: Candance Gamble, PA-C   Date and time admitted to hospital: 7/23/2019  5:30 PM        * Schizoaffective disorder, bipolar type [facility])  Assessment & Plan  Psychiatry Progress Note    Patient again found laying back in bed and has not attended any groups yesterday and has not taken any  any showers in last 3 days days after taking 1 last Saturday  She has started taking the Zoloft and claims that she is still depressed but she is able to sit up and talk with me when approached in her room  She continues to have psychosomatic symptoms about not being able to breathe or not able to swallow etc which have not changed and has multiple excuses for not taking showers or going to groups  She is still suspicious about certain staff who gives her medications like her inhalers and the spirometer  and continues to blame the staff for withholding information as she thinks she may have cancer  She does not admit to any overt hallucinations or paranoia but still appears to harbor some covert  paranoia based on her demeanor and interaction on the unit   She continues to believe that that there is a micro chip on her right forearm and still accuses the therapist of stealing her lover who is supposedly a medical doctor here   No current suicidal homicidal thoughts intent or plans reported  No overt hallucinations or other delusions reported   No signs or sxs of agranulocytosis or myocarditis or endocarditis and she is moving her bowels so far with no issues   Patient was again reminded to attend to ADLs skills and take showers at least every other day and attend more groups to keep up with her 25% expectation   She has now accepted the fact that she can no longer go back to the [facility] lod which is a personal care home that she was in before in [address] [place] [address], and that she can only go back to the [facility] in [facility] and she was told that she needs to show improvement with her ADLs before we can consider that    She was encouraged to start attending at least 25 % of the groups when she has not been compliant with the behavior plan that was instituted as well   Current medications:    Current Facility-Administered Medications:     acetaminophen (TYLENOL) tablet 650 mg, 650 mg, Oral, Q6H PRN, Shilpa Trujillo MD    albuterol (PROVENTIL HFA,VENTOLIN HFA) inhaler 2 puff, 2 puff, Inhalation, Q4H PRN, Shilpa Trujillo MD, 2 puff at 10/11/19 0424    aluminum-magnesium hydroxide-simethicone (MYLANTA) 200-200-20 mg/5 mL oral suspension 15 mL, 15 mL, Oral, Q4H PRN, Shilpa Trujillo MD    ammonium lactate (LAC-HYDRIN) 12 % lotion 1 application, 1 application, Topical, BID PRN, Shilpa Trujillo MD    benzonatate (TESSALON PERLES) capsule 100 mg, 100 mg, Oral, TID PRN, Shilpa Trujillo MD    benztropine (COGENTIN) injection 1 mg, 1 mg, Intramuscular, Q8H PRN, Shilpa Trujillo MD    cloZAPine (CLOZARIL) tablet 25 mg, 25 mg, Oral, BID, Shilpa Trujillo MD, 25 mg at 11/05/19 2124    cloZAPine (CLOZARIL) tablet 50 mg, 50 mg, Oral, BID, Shilpa Trujillo MD, 50 mg at 11/05/19 2124    EPINEPHrine PF (ADRENALIN) 1 mg/mL injection 0 15 mg, 0 15 mg, Intramuscular, Once PRN, Shilpa Trujillo MD    fluticasone-vilanterol (BREO ELLIPTA) 200-25 MCG/INH inhaler 1 puff, 1 puff, Inhalation, Daily, Shilpa Trujillo MD, 1 puff at 11/06/19 0857    ketotifen (ZADITOR) 0 025 % ophthalmic solution 1 drop, 1 drop, Right Eye, BID PRN, Shilpa Trujillo MD    levothyroxine tablet 125 mcg, 125 mcg, Oral, Early Morning, Shilpa Trujillo MD, 125 mcg at 11/06/19 0605    magnesium hydroxide (MILK OF MAGNESIA) 400 mg/5 mL oral suspension 30 mL, 30 mL, Oral, Daily PRN, Shilpa Trujillo MD    montelukast (SINGULAIR) tablet 10 mg, 10 mg, Oral, HS, Shilpa Trujillo MD, 10 mg at 11/05/19 2124    OLANZapine (ZyPREXA) IM injection 5 mg, 5 mg, Intramuscular, Q8H PRN, Ivonne Nevarez MD    OLANZapine (ZyPREXA) tablet 5 mg, 5 mg, Oral, Q8H PRN, Ivonne Nevarez MD    ondansetron (ZOFRAN-ODT) dispersible tablet 4 mg, 4 mg, Oral, Q6H PRN, Ivonne Nevarez MD    pantoprazole (PROTONIX) EC tablet 40 mg, 40 mg, Oral, Early Morning, Ivonne Nevarez MD, 40 mg at 11/06/19 0605    polyethylene glycol (MIRALAX) packet 17 g, 17 g, Oral, Daily PRN, Ivonne Nevarez MD    polyvinyl alcohol (LIQUIFILM TEARS) 1 4 % ophthalmic solution 1 drop, 1 drop, Both Eyes, Q3H PRN, Ivonne Nevarez MD    sertraline (ZOLOFT) tablet 50 mg, 50 mg, Oral, Daily, Ivonne Nevarez MD, 50 mg at 11/06/19 0856    theophylline (JEF-24) 24 hr capsule 200 mg, 200 mg, Oral, Daily, Ivonne Nevarez MD, 200 mg at 11/06/19 0856    tiotropium (SPIRIVA) capsule for inhaler 18 mcg, 18 mcg, Inhalation, Daily, Ivonne Nevarez MD, 18 mcg at 11/06/19 0856    traZODone (DESYREL) tablet 25 mg, 25 mg, Oral, HS PRN, Ivonne Nevarez MD  Justification if on more than two antipsychotics:  Only on his clozapine  Side effects if any:  None    Risks , benefits, side effects and precautions of medications discussed with patient and reminded patient to let us know any problems with medications     Objective:     Vital Signs:  Vitals:    11/05/19 1610 11/05/19 2005 11/05/19 2130 11/06/19 0730   BP: 105/63 90/55  101/70   BP Location: Right arm Left arm  Left arm   Pulse: 84 67  82   Resp: 18 18 19   Temp: 98 7 °F (37 1 °C) 98 4 °F (36 9 °C)  98 °F (36 7 °C)   TempSrc: Temporal Temporal  Temporal   SpO2: 97% 91% 92%    Weight:       Height:         Appearance:  age appropriate, casually dressed and overweight older than stated age, uncombed long grey hair, poorly groomed disheveled unkempt with a body odor again laying in bed   Behavior:  evasive and guarded and suspicious but with good eye contact and preoccupied and being passive-aggressive   Speech:  normal pitch and normal volume but circumstantial and tangential   Mood:  anxious and dysthymic   Affect: mood-congruent, elated and entitled   Thought Process:  goal directed and illogical slightly pressured and tangential and talks as if in a court of law being stilted    Thought Content:  delusions  grandiose and somatic type about not being able to breathe despite being on nasal oxygen and paranoid about people out to harm her and Atrovent inhaler tainteded with peanut oil  No current suicidal homicidal thoughts intent or plans reported  No phobias obsessions or compulsions reported  Somatic delusions about having cancer or terrible illnesses like cancer or having a micro chip on her hand and the therapist stealing her lover from her  She believes that people are withholding information from her about some kind of terrible disease she thing she has  Perceptual Disturbances: None and does not appear responding to internal stimuli   Risk Potential: Tendency to not care for herself if she goes off treatment   Sensorium:  person, place, time/date, situation, day of week, month of year and time   Cognition:  grossly intact   Consciousness:  alert and awake    Attention: Intact concentration and attention span   Intellect: Considered to be at least of average intelligence   Insight:  limited in denial   Judgment: poor      Motor Activity: no abnormal movements         Recent Labs:  Results Reviewed     None          I/O Past 24 hours:  No intake/output data recorded  No intake/output data recorded          Assessment / Plan:     [facility]ffective disorder[facility] bipolar [facility]      Reason for continued inpatient care:  Because of underlying paranoia and refusal to go back to the personal care home and inability to care for herself on her own  Acceptance by patient:  Accepting  Jordy Lee in recovery:  About living in another personal care home once she feels better  Understanding of medications :  Has some understanding  Involved in reintegration process:  Adjusting to the unit  Trusting in relatoinship with psychiatrist:  Trusting    Recommended Treatment:    Medication changes:  1) no changes today since Zoloft was added and Prozac being discontinued  Non-pharmacological treatments  1) continue with  individual therapy group therapy, milieu therapy and occupational therapy and milieu therapy involving multidisciplinary team approach with psychotherapist, case management, nursing, peer support services, art therapist etc using recovery principles and psycho-education about accepting illness and need for treatment   3) encourage to attend to ADLs like taking showers and wearing clean clothes   4) Encourage to attend groups   Safety  1) Safety/communication plan established targeting dynamic risk factors above  Discharge Plan most likely back to the a:  Personal care boarding home with act services once her delusions are managed and mood becomes more stabilized and she becomes more receptive to treatment compliance    Counseling / Coordination of Care    Total floor / unit time spent today 15 minutes  Greater than 50% of total time was spent with the patient and / or family counseling and / or coordination of care  A description of the counseling / coordination of care  Patient's Rights, confidentiality and exceptions to confidentiality, use of automated medical record, [address] [place] [address] staff access to medical record, and consent to treatment reviewed      Jerome Lopez MD

## 2019-11-06 NOTE — PROGRESS NOTES
11/06/19 0800   Team Meeting   Meeting Type Daily Rounds   Team Members Present   Team Members Present Physician;Nurse;; Other (Discipline and Name)   Physician Team Member Dr Shannan Higginbotham Team Member Bonilla Domingo RN   Care Management Team Member Brenda Ellington   Other (Discipline and Name) Rhianna Beckman [facility]     No change in patient's somatic behaviors  Not attending groups or engaging in any positive redirection from the team   Slept

## 2019-11-06 NOTE — PLAN OF CARE
Problem: PAIN - ADULT  Goal: Verbalizes/displays adequate comfort level or baseline comfort level  Description  Interventions:  - Encourage patient to monitor pain and request assistance  - Assess pain using appropriate pain scale  - Administer analgesics based on type and severity of pain and evaluate response  - Implement non-pharmacological measures as appropriate and evaluate response  - Consider cultural and social influences on pain and pain management  - Notify physician/advanced practitioner if interventions unsuccessful or patient reports new pain  Outcome: Progressing     Problem: SAFETY ADULT  Goal: Patient will remain free of falls  Description  INTERVENTIONS:  - Assess patient frequently for physical needs  -  Identify cognitive and physical deficits and behaviors that affect risk of falls    -  [place] fall precautions as indicated by assessment   - Educate patient/family on patient safety including physical limitations  - Instruct patient to call for assistance with activity based on assessment  - Modify environment to reduce risk of injury  - Consider OT/PT consult to assist with strengthening/mobility  Outcome: Progressing     Problem: RESPIRATORY - ADULT  Goal: Achieves optimal ventilation and oxygenation  Description  INTERVENTIONS:  - Assess for changes in respiratory status  - Assess for changes in mentation and behavior  - Position to facilitate oxygenation and minimize respiratory effort  - Oxygen administration by appropriate delivery method based on oxygen saturation (per order) or ABGs  - Initiate smoking cessation education as indicated  - Encourage broncho-pulmonary hygiene including cough, deep breathe, Incentive Spirometry  - Assess the need for suctioning and aspirate as needed  - Assess and instruct to report SOB or any respiratory difficulty  - Respiratory Therapy support as indicated  Outcome: Progressing     Problem: SLEEP DISTURBANCE  Goal: Will exhibit normal sleeping pattern  Description  Interventions:  -  Assess the patients sleep pattern, noting recent changes  - Administer medication as ordered  - Decrease environmental stimuli, including noise, as appropriate during the night  - Encourage the patient to actively participate in unit groups and or exercise during the day to enhance ability to achieve adequate sleep at night  - Assess the patient, in the morning, encouraging a description of sleep experience  Outcome: Rodo Maradiaga maintained on ongoing SAFE precaution without incident on this shift   Laying in bed with eyes closed, breath even and unlabored   Q 15 minutes rounding continues  No indication of pain or discomfort noted   No respiratory distress noted   During round noted O2 at 2L/m instead of 1L, reset to 1L/m with adequate amount of humidifier as ordered    On 1L O2 with humidifier via NC while asleep  No behavioral noted   Will continue to monitor

## 2019-11-07 NOTE — PROGRESS NOTES
11/07/19 0800   Team Meeting   Meeting Type Daily Rounds   Team Members Present   Team Members Present Physician;Nurse; Other (Discipline and Name)   Physician Team Member Dr Ricardo Rivera Team Member Delphine Whiting RN   Other (Discipline and Name) Twan Richards LCSW     "Dramatically somatic "  Preoccupied with Zoloft, believing it is a peanut derivative  Acted out because she slept through snack

## 2019-11-07 NOTE — ASSESSMENT & PLAN NOTE
Psychiatry Progress Note    Patient has not taken showers since last Saturday despite repeated reminders and behavior plan and promptings by staff  She has multiple psychosomatic complaints and excuses and now suspects that the Zoloft has peanut oil in it  She is again found laying back in bed and has not attended any groups yesterday claiming that she is not feeling well     She has started taking the Zoloft and claims that she is still depressed but she is able to sit up and talk with me when approached in her room  She continues to have psychosomatic symptoms about not being able to breathe or not able to swallow etc which have not changed and has multiple excuses for not taking showers or going to groups  She is still suspicious about certain staff who gives her medications like her inhalers and the spirometer  and continues to blame the staff for withholding information as she thinks she may have cancer or some other treatable disease  She does not admit to any overt hallucinations or paranoia but still appears to harbor some covert  paranoia based on her demeanor and interaction on the unit   She continues to believe that that there is a micro chip on her right forearm and still accuses the therapist of stealing her lover who is supposedly a medical doctor here but no one here knows who that medical doctor is   No current suicidal homicidal thoughts intent or plans reported  No overt hallucinations or other delusions reported   No signs or sxs of agranulocytosis or myocarditis or endocarditis and she is moving her bowels so far with no issues   Patient was again reminded to attend to ADLs skills and take showers at least every other day and attend more groups to keep up with her 25% expectation    She was again reminded to keep up with the 25% expectation of group attendance so she can go back to the [employer] personal care [facility] but she continues to remain passive-aggressive when he has his attend to cooperate   Current medications:    Current Facility-Administered Medications:     acetaminophen (TYLENOL) tablet 650 mg, 650 mg, Oral, Q6H PRN, Jaz Beasley MD    albuterol (PROVENTIL HFA,VENTOLIN HFA) inhaler 2 puff, 2 puff, Inhalation, Q4H PRN, Jaz Beasley MD, 2 puff at 10/11/19 0424    aluminum-magnesium hydroxide-simethicone (MYLANTA) 200-200-20 mg/5 mL oral suspension 15 mL, 15 mL, Oral, Q4H PRN, Jaz Beasley MD    ammonium lactate (LAC-HYDRIN) 12 % lotion 1 application, 1 application, Topical, BID PRN, Jaz Beasley MD    benzonatate (TESSALON PERLES) capsule 100 mg, 100 mg, Oral, TID PRN, Jaz Beasley MD    benztropine (COGENTIN) injection 1 mg, 1 mg, Intramuscular, Q8H PRN, Jaz Beasley MD    cloZAPine (CLOZARIL) tablet 25 mg, 25 mg, Oral, BID, Jaz Beasley MD, 25 mg at 11/06/19 2143    cloZAPine (CLOZARIL) tablet 50 mg, 50 mg, Oral, BID, Jaz Beasley MD, 50 mg at 11/06/19 2144    EPINEPHrine PF (ADRENALIN) 1 mg/mL injection 0 15 mg, 0 15 mg, Intramuscular, Once PRN, Jaz Beasley MD    fluticasone-vilanterol (BREO ELLIPTA) 200-25 MCG/INH inhaler 1 puff, 1 puff, Inhalation, Daily, Jaz Beasley MD, 1 puff at 11/07/19 0903    ketotifen (ZADITOR) 0 025 % ophthalmic solution 1 drop, 1 drop, Right Eye, BID PRN, Jaz Beasley MD    levothyroxine tablet 125 mcg, 125 mcg, Oral, Early Morning, Jaz Beasley MD, 125 mcg at 11/07/19 0531    magnesium hydroxide (MILK OF MAGNESIA) 400 mg/5 mL oral suspension 30 mL, 30 mL, Oral, Daily PRN, Jaz Beasley MD    montelukast (SINGULAIR) tablet 10 mg, 10 mg, Oral, HS, Jaz Beasley MD, 10 mg at 11/06/19 2144    OLANZapine (ZyPREXA) IM injection 5 mg, 5 mg, Intramuscular, Q8H PRN, Jaz Beasley MD    OLANZapine (ZyPREXA) tablet 5 mg, 5 mg, Oral, Q8H PRN, Jaz Beasley MD    ondansetron (ZOFRAN-ODT) dispersible tablet 4 mg, 4 mg, Oral, Q6H PRN, Jaz Beasley MD    pantoprazole (PROTONIX) EC tablet 40 mg, 40 mg, Oral, Early Morning, Jaz Beasley MD, 40 mg at 11/07/19 0531    polyethylene glycol (MIRALAX) packet 17 g, 17 g, Oral, Daily PRN, Deedee Muir MD    polyvinyl alcohol (LIQUIFILM TEARS) 1 4 % ophthalmic solution 1 drop, 1 drop, Both Eyes, Q3H PRN, Deedee Muir MD    sertraline (ZOLOFT) tablet 50 mg, 50 mg, Oral, Daily, Deedee Muir MD, 50 mg at 11/07/19 0904    theophylline (JEF-24) 24 hr capsule 200 mg, 200 mg, Oral, Daily, Deedee Muir MD, 200 mg at 11/07/19 0904    tiotropium (SPIRIVA) capsule for inhaler 18 mcg, 18 mcg, Inhalation, Daily, Deedee Muir MD, 18 mcg at 11/07/19 0903    traZODone (DESYREL) tablet 25 mg, 25 mg, Oral, HS PRN, Deedee Muir MD  Justification if on more than two antipsychotics:  Only on his clozapine  Side effects if any:  None    Risks , benefits, side effects and precautions of medications discussed with patient and reminded patient to let us know any problems with medications     Objective:     Vital Signs:  Vitals:    11/06/19 0730 11/06/19 1600 11/06/19 1900 11/07/19 0730   BP: 101/70 94/57 105/61 100/65   BP Location: Left arm Left arm Left arm Right arm   Pulse: 82 68 79 76   Resp: 19 18 19 18   Temp: 98 °F (36 7 °C) (!) 97 4 °F (36 3 °C) 98 3 °F (36 8 °C) 97 9 °F (36 6 °C)   TempSrc: Temporal Temporal Temporal    SpO2:  95% 95% 95%   Weight:       Height:         Appearance:  age appropriate, casually dressed and overweight older than stated age, uncombed long grey hair, poorly groomed disheveled unkempt with a body odor refusing to get up or go to program   Behavior:  evasive and guarded and suspicious but with good eye contact and preoccupied and being passive-aggressive and disheveled and unkept   Speech:  normal pitch and normal volume but circumstantial and tangential   Mood:  anxious and dysthymic   Affect:  mood-congruent, elated and entitled at times   Thought Process:  goal directed and illogical slightly pressured and tangential and talks as if in a court of law being stilted off and on   Thought Content: delusions  grandiose and somatic type about not being able to breathe despite being on nasal oxygen and paranoid about people out to harm her and Atrovent inhaler tainted with peanut oil  No current suicidal homicidal thoughts intent or plans reported  No phobias obsessions or compulsions reported  Somatic delusions about having cancer or terrible illnesses like cancer or having a micro chip on her hand and the therapist stealing her lover from her  She believes that people are withholding information from her about some kind of terrible disease she thing she has  Perceptual Disturbances: None and does not appear responding to internal stimuli   Risk Potential: Tendency to not care for herself if she goes off treatment   Sensorium:  person, place, time/date, situation, day of week, month of year and time   Cognition:  grossly intact   Consciousness:  alert and awake    Attention: Intact concentration and attention span   Intellect: Considered to be at least of average intelligence   Insight:  limited and in denial   Judgment: poor      Motor Activity: no abnormal movements         Recent Labs:  Results Reviewed     None          I/O Past 24 hours:  No intake/output data recorded  No intake/output data recorded          Assessment / Plan:     Schizoaffective disorder, bipolar [facility]      Reason for continued inpatient care:  Because of underlying paranoia and refusal to go back to the personal care home and inability to care for herself on her own  Acceptance by patient:  Accepting  Ros Patel in recovery:  About living in another personal care home once she feels better  Understanding of medications :  Has some understanding  Involved in reintegration process:  Adjusting to the unit  Trusting in relatoinship with psychiatrist:  Trusting    Recommended Treatment:    Medication changes:  1) no changes today since Zoloft was added and Prozac being discontinued  Non-pharmacological treatments  1) continue with  individual therapy group therapy, milieu therapy and occupational therapy and milieu therapy involving multidisciplinary team approach with psychotherapist, case management, nursing, peer support services, art therapist etc using recovery principles and psycho-education about accepting illness and need for treatment   3) encourage to attend to ADLs like taking showers and wearing clean clothes   4) Encourage to attend groups   Safety  1) Safety/communication plan established targeting dynamic risk factors above  Discharge Plan most likely back to the a:  Personal care boarding home with act services once her delusions are managed and mood becomes more stabilized and she becomes more receptive to treatment compliance    Counseling / Coordination of Care    Total floor / unit time spent today 15 minutes  Greater than 50% of total time was spent with the patient and / or family counseling and / or coordination of care  A description of the counseling / coordination of care  Patient's Rights, confidentiality and exceptions to confidentiality, use of automated medical record, [facility], and consent to treatment reviewed      Sandhya Bolaños MD

## 2019-11-07 NOTE — PLAN OF CARE
Problem: Alteration in Thoughts and Perception  Goal: Verbalize thoughts and feelings  Description  Interventions:  - Promote a nonjudgmental and trusting relationship with the patient through active listening and therapeutic communication  - Assess patient's level of functioning, behavior and potential for risk  - Engage patient in 1 on 1 interactions for a minimum of 15 minutes each session  - Encourage patient to express fears, feelings, frustrations, and discuss symptoms    - [place] patient to reality, help patient recognize reality-based thinking   - Administer medications as ordered and assess for potential side effects  - Provide the patient education related to the signs and symptoms of the illness and desired effects of prescribed medications  Outcome: Progressing  Goal: Agree to be compliant with medication regime, as prescribed and report medication side effects  Description  Interventions:  - Offer appropriate PRN medication and supervise ingestion; conduct aims, as needed   Outcome: Progressing     Problem: Ineffective Coping  Goal: Patient/Family verbalizes awareness of resources  Outcome: Progressing  Goal: Understands least restrictive measures  Description  Interventions:  - Utilize least restrictive behavior  Outcome: Progressing     Problem: Risk for Self Injury/Neglect  Goal: Treatment Goal: Remain safe during length of stay, learn and adopt new coping skills, and be free of self-injurious ideation, impulses and acts at the time of discharge  Outcome: Progressing  Goal: Verbalize thoughts and feelings  Description  Interventions:  - Assess and re-assess patient's lethality and potential for self-injury  - Engage patient in 1:1 interactions, daily, for a minimum of 15 minutes  - Encourage patient to express feelings, fears, frustrations, hopes  - Establish rapport/trust with patient   Outcome: Progressing  Goal: Refrain from harming self  Description  Interventions:  - Monitor patient closely, per order  - Develop a trusting relationship  - Supervise medication ingestion, monitor effects and side effects   Outcome: Progressing     Problem: Depression  Goal: Verbalize thoughts and feelings  Description  Interventions:  - Assess and re-assess patient's level of risk   - Engage patient in 1:1 interactions, daily, for a minimum of 15 minutes   - Encourage patient to express feelings, fears, frustrations, hopes   Outcome: Progressing     Problem: Anxiety  Goal: Anxiety is at manageable level  Description  Interventions:  - Assess and monitor patient's anxiety level  - Monitor for signs and symptoms of anxiety both physical and emotional (heart palpitations, chest pain, shortness of breath, headaches, nausea, feeling jumpy, restlessness, irritable, apprehensive)  - Collaborate with interdisciplinary team and initiate plan and interventions as ordered    - [place] patient to unit/surroundings  - Explain treatment plan  - Encourage participation in care  - Encourage verbalization of concerns/fears  - Identify coping mechanisms  - Assist in developing anxiety-reducing skills  - Administer/offer alternative therapies  - Limit or eliminate stimulants  Outcome: Progressing     Problem: Alteration in Orientation  Goal: Interact with staff daily  Description  Interventions:  - Assess and re-assess patient's level of orientation  - Engage patient in 1 on 1 interactions, daily, for a minimum of 15 minutes   - Establish rapport/trust with patient   Outcome: Progressing     Problem: PAIN - ADULT  Goal: Verbalizes/displays adequate comfort level or baseline comfort level  Description  Interventions:  - Encourage patient to monitor pain and request assistance  - Assess pain using appropriate pain scale  - Administer analgesics based on type and severity of pain and evaluate response  - Implement non-pharmacological measures as appropriate and evaluate response  - Consider cultural and social influences on pain and pain management  - Notify physician/advanced practitioner if interventions unsuccessful or patient reports new pain  Outcome: Progressing     Problem: SAFETY ADULT  Goal: Patient will remain free of falls  Description  INTERVENTIONS:  - Assess patient frequently for physical needs  -  Identify cognitive and physical deficits and behaviors that affect risk of falls  -  [place] fall precautions as indicated by assessment   - Educate patient/family on patient safety including physical limitations  - Instruct patient to call for assistance with activity based on assessment  - Modify environment to reduce risk of injury  - Consider OT/PT consult to assist with strengthening/mobility  Outcome: Progressing     Problem: Alteration in Thoughts and Perception  Goal: Treatment Goal: Gain control of psychotic behaviors/thinking, reduce/eliminate presenting symptoms and demonstrate improved reality functioning upon discharge  Outcome: Not Progressing  Goal: Attend and participate in unit activities, including therapeutic, recreational, and educational groups  Description  Interventions:  - Provide therapeutic and educational activities daily, encourage attendance and participation, and document same in the medical record     CERTIFIED PEER SPECIALIST INTERVENTIONS:    Complete peer assessment with patient to assess their needs and identify their goals to complete while in the recovery program as well as once discharged into the community  Patient will complete WRAP Plan, Crisis Plan and 5 Life Domains  Patient will attend 50% of groups offered on the unit  Patient will complete a goal card weekly      Outcome: Not Progressing  Goal: Recognize dysfunctional thoughts, communicate reality-based thoughts at the time of discharge  Description  Interventions:  - Provide medication and psycho-education to assist patient in compliance and developing insight into his/her illness   Outcome: Not Progressing  Goal: Complete daily ADLs, including personal hygiene independently, as able  Description  Interventions:  - Observe, teach, and assist patient with ADLS  - Monitor and promote a balance of rest/activity, with adequate nutrition and elimination   Outcome: Not Progressing     Problem: Ineffective Coping  Goal: Identifies ineffective coping skills  Outcome: Not Progressing  Goal: Identifies healthy coping skills  Outcome: Not Progressing  Goal: Demonstrates healthy coping skills  Outcome: Not Progressing  Goal: Participates in unit activities  Description  Interventions:  - Provide therapeutic environment   - Provide required programming   - Redirect inappropriate behaviors   Outcome: Not Progressing     Problem: Risk for Self Injury/Neglect  Goal: Attend and participate in unit activities, including therapeutic, recreational, and educational groups  Description  Interventions:  - Provide therapeutic and educational activities daily, encourage attendance and participation, and document same in the medical record  - Obtain collateral information, encourage visitation and family involvement in care   Outcome: Not Progressing  Goal: Recognize maladaptive responses and adopt new coping mechanisms  Outcome: Not Progressing  Goal: Complete daily ADLs, including personal hygiene independently, as able  Description  Interventions:  - Observe, teach, and assist patient with ADLS  - Monitor and promote a balance of rest/activity, with adequate nutrition and elimination  Outcome: Not Progressing     Problem: Depression  Goal: Treatment Goal: Demonstrate behavioral control of depressive symptoms, verbalize feelings of improved mood/affect, and adopt new coping skills prior to discharge  Outcome: Not Progressing  Goal: Refrain from isolation  Description  Interventions:  - Develop a trusting relationship   - Encourage socialization   Outcome: Not Progressing  Goal: Refrain from self-neglect  Outcome: Not Progressing     Problem: RESPIRATORY - ADULT  Goal: Achieves optimal ventilation and oxygenation  Description  INTERVENTIONS:  - Assess for changes in respiratory status  - Assess for changes in mentation and behavior  - Position to facilitate oxygenation and minimize respiratory effort  - Oxygen administration by appropriate delivery method based on oxygen saturation (per order) or ABGs  - Initiate smoking cessation education as indicated  - Encourage broncho-pulmonary hygiene including cough, deep breathe, Incentive Spirometry  - Assess the need for suctioning and aspirate as needed  - Assess and instruct to report SOB or any respiratory difficulty  - Respiratory Therapy support as indicated  Outcome: Not Progressing     Problem: Nutrition/Hydration-ADULT  Goal: Nutrient/Hydration intake appropriate for improving, restoring or maintaining nutritional needs  Description  Monitor and assess patient's nutrition/hydration status for malnutrition  Collaborate with interdisciplinary team and initiate plan and interventions as ordered  Monitor patient's weight and dietary intake as ordered or per policy  Utilize nutrition screening tool and intervene as necessary  Determine patient's food preferences and provide high-protein, high-caloric foods as appropriate       INTERVENTIONS:  - Monitor oral intake, urinary output, labs, and treatment plans  - Assess nutrition and hydration status and recommend course of action  - Evaluate amount of meals eaten  - Assist patient with eating if necessary   - Allow adequate time for meals  - Recommend/ encourage appropriate diets, oral nutritional supplements, and vitamin/mineral supplements  - Order, calculate, and assess calorie counts as needed  - Recommend, monitor, and adjust tube feedings and TPN/PPN based on assessed needs  - Assess need for intravenous fluids  - Provide specific nutrition/hydration education as appropriate  - Include patient/family/caregiver in decisions related to nutrition  Outcome: Not Progressing   Isolative to her room with the exception of meds and meals  No groups attended  Non-compliant with staff recommendations to improve health and wellbeing  Refused to shower  Refused to use incentive spirometer  Ate 50% of breakfast, refused lunch, but had an ensure  No other behaviors or issues noted  Continue to monitor

## 2019-11-07 NOTE — PLAN OF CARE
Problem: Alteration in Thoughts and Perception  Goal: Verbalize thoughts and feelings  Description  Interventions:  - Promote a nonjudgmental and trusting relationship with the patient through active listening and therapeutic communication  - Assess patient's level of functioning, behavior and potential for risk  - Engage patient in 1 on 1 interactions for a minimum of 15 minutes each session  - Encourage patient to express fears, feelings, frustrations, and discuss symptoms    - [place] patient to reality, help patient recognize reality-based thinking   - Administer medications as ordered and assess for potential side effects  - Provide the patient education related to the signs and symptoms of the illness and desired effects of prescribed medications  Outcome: Progressing  Goal: Agree to be compliant with medication regime, as prescribed and report medication side effects  Description  Interventions:  - Offer appropriate PRN medication and supervise ingestion; conduct aims, as needed   Outcome: Progressing     Problem: Depression  Goal: Refrain from isolation  Description  Interventions:  - Develop a trusting relationship   - Encourage socialization   Outcome: Progressing     Problem: RESPIRATORY - ADULT  Goal: Achieves optimal ventilation and oxygenation  Description  INTERVENTIONS:  - Assess for changes in respiratory status  - Assess for changes in mentation and behavior  - Position to facilitate oxygenation and minimize respiratory effort  - Oxygen administration by appropriate delivery method based on oxygen saturation (per order) or ABGs  - Initiate smoking cessation education as indicated  - Encourage broncho-pulmonary hygiene including cough, deep breathe, Incentive Spirometry  - Assess the need for suctioning and aspirate as needed  - Assess and instruct to report SOB or any respiratory difficulty  - Respiratory Therapy support as indicated  Outcome: Progressing     Problem: Alteration in Thoughts and Perception  Goal: Attend and participate in unit activities, including therapeutic, recreational, and educational groups  Description  Interventions:  - Provide therapeutic and educational activities daily, encourage attendance and participation, and document same in the medical record     CERTIFIED PEER SPECIALIST INTERVENTIONS:    Complete peer assessment with patient to assess their needs and identify their goals to complete while in the recovery program as well as once discharged into the community  Patient will complete WRAP Plan, Crisis Plan and 5 Life Domains  Patient will attend 50% of groups offered on the unit  Patient will complete a goal card weekly  Outcome: Not Progressing  Goal: Complete daily ADLs, including personal hygiene independently, as able  Description  Interventions:  - Observe, teach, and assist patient with ADLS  - Monitor and promote a balance of rest/activity, with adequate nutrition and elimination   Outcome: Not Progressing     [address] has been more visible this shift, sitting out in dining room or in chair by phone when it is not in use  But, she is worrisome, somatic, nearly immobilized by her fears  Focuses on fears of impending anaphylaxis, hypoglycemia, & choking to death on her food  For supper had Ensure along w/only juice & milk as afraid to eat the entree'  "I need to eat solids  What should I do?" Teaching done w/her that she may have resurgence of psychiatric symptoms (magnification of her fears) as the Prozac leaves her body & the Zoloft not yet @ therapeutic range, must be patient  She did do the Incentive Spirometry w/success, volumes of 1250ml achieved  Did not attend to any hygiene  Did not attend PM Group  Earnest Rubin asleep in room, missing HS snack  In a panic when awoke  "I MUST have yogurt!" Was permitted the yogurt, had two  Has been medicine compliant  Wearing her QHS humidified nasal O2 @ 1L now for bed

## 2019-11-07 NOTE — PLAN OF CARE
Problem: PAIN - ADULT  Goal: Verbalizes/displays adequate comfort level or baseline comfort level  Description  Interventions:  - Encourage patient to monitor pain and request assistance  - Assess pain using appropriate pain scale  - Administer analgesics based on type and severity of pain and evaluate response  - Implement non-pharmacological measures as appropriate and evaluate response  - Consider cultural and social influences on pain and pain management  - Notify physician/advanced practitioner if interventions unsuccessful or patient reports new pain  Outcome: Progressing     Problem: SAFETY ADULT  Goal: Patient will remain free of falls  Description  INTERVENTIONS:  - Assess patient frequently for physical needs  -  Identify cognitive and physical deficits and behaviors that affect risk of falls    -  [place] fall precautions as indicated by assessment   - Educate patient/family on patient safety including physical limitations  - Instruct patient to call for assistance with activity based on assessment  - Modify environment to reduce risk of injury  - Consider OT/PT consult to assist with strengthening/mobility  Outcome: Progressing     Problem: RESPIRATORY - ADULT  Goal: Achieves optimal ventilation and oxygenation  Description  INTERVENTIONS:  - Assess for changes in respiratory status  - Assess for changes in mentation and behavior  - Position to facilitate oxygenation and minimize respiratory effort  - Oxygen administration by appropriate delivery method based on oxygen saturation (per order) or ABGs  - Initiate smoking cessation education as indicated  - Encourage broncho-pulmonary hygiene including cough, deep breathe, Incentive Spirometry  - Assess the need for suctioning and aspirate as needed  - Assess and instruct to report SOB or any respiratory difficulty  - Respiratory Therapy support as indicated  Outcome: Progressing     Problem: SLEEP DISTURBANCE  Goal: Will exhibit normal sleeping pattern  Description  Interventions:  -  Assess the patients sleep pattern, noting recent changes  - Administer medication as ordered  - Decrease environmental stimuli, including noise, as appropriate during the night  - Encourage the patient to actively participate in unit groups and or exercise during the day to enhance ability to achieve adequate sleep at night  - Assess the patient, in the morning, encouraging a description of sleep experience  Outcome: aTmie Justin maintained on ongoing SAFE precaution without incident on this shift   Laying in bed with eyes closed, breath even and unlabored   Q 15 minutes rounding continues  No indication of pain or discomfort noted   No respiratory distress noted  On 1L/m with humidifier via NC    Will continue to monitor

## 2019-11-07 NOTE — PROGRESS NOTES
Progress Note - Boneta Conquest 1955, 60 y o  female MRN: [id number]    [facility] [address] Encounter: [id number]    Primary Care Provider: Inderjit Sánchez PA-C   Date and time admitted to hospital: 7/23/2019  5:30 PM        * Schizoaffective disorder, bipolar type [facility])  Assessment & Plan  Psychiatry Progress Note    Patient has not taken showers since last Saturday despite repeated reminders and behavior plan and promptings by staff  She has multiple psychosomatic complaints and excuses and now suspects that the Zoloft has peanut oil in it  She is again found laying back in bed and has not attended any groups yesterday claiming that she is not feeling well     She has started taking the Zoloft and claims that she is still depressed but she is able to sit up and talk with me when approached in her room  She continues to have psychosomatic symptoms about not being able to breathe or not able to swallow etc which have not changed and has multiple excuses for not taking showers or going to groups  She is still suspicious about certain staff who gives her medications like her inhalers and the spirometer  and continues to blame the staff for withholding information as she thinks she may have cancer or some other treatable disease  She does not admit to any overt hallucinations or paranoia but still appears to harbor some covert  paranoia based on her demeanor and interaction on the unit   She continues to believe that that there is a micro chip on her right forearm and still accuses the therapist of stealing her lover who is supposedly a medical doctor here but no one here knows who that medical doctor is   No current suicidal homicidal thoughts intent or plans reported  No overt hallucinations or other delusions reported   No signs or sxs of agranulocytosis or myocarditis or endocarditis and she is moving her bowels so far with no issues     Patient was again reminded to attend to ADLs skills and take showers at least every other day and attend more groups to keep up with her 25% expectation    She was again reminded to keep up with the 25% expectation of group attendance so she can go back to the [facility] [place] but she continues to remain passive-aggressive when he has his attend to cooperate   Current medications:    Current Facility-Administered Medications:     acetaminophen (TYLENOL) tablet 650 mg, 650 mg, Oral, Q6H PRN, Deep Durand MD    albuterol (PROVENTIL HFA,VENTOLIN HFA) inhaler 2 puff, 2 puff, Inhalation, Q4H PRN, Deep Durand MD, 2 puff at 10/11/19 0424    aluminum-magnesium hydroxide-simethicone (MYLANTA) 200-200-20 mg/5 mL oral suspension 15 mL, 15 mL, Oral, Q4H PRN, Deep Durand MD    ammonium lactate (LAC-HYDRIN) 12 % lotion 1 application, 1 application, Topical, BID PRN, Deep Durand MD    benzonatate (TESSALON PERLES) capsule 100 mg, 100 mg, Oral, TID PRN, Deep Durand MD    benztropine (COGENTIN) injection 1 mg, 1 mg, Intramuscular, Q8H PRN, Deep Durand MD    cloZAPine (CLOZARIL) tablet 25 mg, 25 mg, Oral, BID, Deep Durand MD, 25 mg at 11/06/19 2143    cloZAPine (CLOZARIL) tablet 50 mg, 50 mg, Oral, BID, Deep Durand MD, 50 mg at 11/06/19 2144    EPINEPHrine PF (ADRENALIN) 1 mg/mL injection 0 15 mg, 0 15 mg, Intramuscular, Once PRN, Deep Durand MD    fluticasone-vilanterol (BREO ELLIPTA) 200-25 MCG/INH inhaler 1 puff, 1 puff, Inhalation, Daily, Deep Durand MD, 1 puff at 11/07/19 0903    ketotifen (ZADITOR) 0 025 % ophthalmic solution 1 drop, 1 drop, Right Eye, BID PRN, Deep Durand MD    levothyroxine tablet 125 mcg, 125 mcg, Oral, Early Morning, Deep Durand MD, 125 mcg at 11/07/19 0531    magnesium hydroxide (MILK OF MAGNESIA) 400 mg/5 mL oral suspension 30 mL, 30 mL, Oral, Daily PRN, Deep Durand MD    montelukast (SINGULAIR) tablet 10 mg, 10 mg, Oral, HS, Deep Durand MD, 10 mg at 11/06/19 6614    OLANZapine (ZyPREXA) IM injection 5 mg, 5 mg, Intramuscular, Q8H PRN, Shilpa Trujillo MD    OLANZapine (ZyPREXA) tablet 5 mg, 5 mg, Oral, Q8H PRN, Shilpa Trujillo MD    ondansetron (ZOFRAN-ODT) dispersible tablet 4 mg, 4 mg, Oral, Q6H PRN, Shilpa Trujillo MD    pantoprazole (PROTONIX) EC tablet 40 mg, 40 mg, Oral, Early Morning, Shilpa Trujillo MD, 40 mg at 11/07/19 0531    polyethylene glycol (MIRALAX) packet 17 g, 17 g, Oral, Daily PRN, Shilpa Trujillo MD    polyvinyl alcohol (LIQUIFILM TEARS) 1 4 % ophthalmic solution 1 drop, 1 drop, Both Eyes, Q3H PRN, Shilpa Trujillo MD    sertraline (ZOLOFT) tablet 50 mg, 50 mg, Oral, Daily, Shilpa Trujillo MD, 50 mg at 11/07/19 0904    theophylline (JEF-24) 24 hr capsule 200 mg, 200 mg, Oral, Daily, Shilpa Trujillo MD, 200 mg at 11/07/19 0904    tiotropium (SPIRIVA) capsule for inhaler 18 mcg, 18 mcg, Inhalation, Daily, Shilpa Trujillo MD, 18 mcg at 11/07/19 0903    traZODone (DESYREL) tablet 25 mg, 25 mg, Oral, HS PRN, Shilpa Trujillo MD  Justification if on more than two antipsychotics:  Only on his clozapine  Side effects if any:  None    Risks , benefits, side effects and precautions of medications discussed with patient and reminded patient to let us know any problems with medications     Objective:     Vital Signs:  Vitals:    11/06/19 0730 11/06/19 1600 11/06/19 1900 11/07/19 0730   BP: 101/70 94/57 105/61 100/65   BP Location: Left arm Left arm Left arm Right arm   Pulse: 82 68 79 76   Resp: 19 18 19 18   Temp: 98 °F (36 7 °C) (!) 97 4 °F (36 3 °C) 98 3 °F (36 8 °C) 97 9 °F (36 6 °C)   TempSrc: Temporal Temporal Temporal    SpO2:  95% 95% 95%   Weight:       Height:         Appearance:  age appropriate, casually dressed and overweight older than stated age, uncombed long grey hair, poorly groomed disheveled unkempt with a body odor refusing to get up or go to program   Behavior:  evasive and guarded and suspicious but with good eye contact and preoccupied and being passive-aggressive and disheveled and unkept   Speech:  normal pitch and normal volume but circumstantial and tangential   Mood:  anxious and dysthymic   Affect:  mood-congruent, elated and entitled at times   Thought Process:  goal directed and illogical slightly pressured and tangential and talks as if in a court of law being stilted off and on   Thought Content:  delusions  grandiose and somatic type about not being able to breathe despite being on nasal oxygen and paranoid about people out to harm her and Atrovent inhaler tainted with peanut oil  No current suicidal homicidal thoughts intent or plans reported  No phobias obsessions or compulsions reported  Somatic delusions about having cancer or terrible illnesses like cancer or having a micro chip on her hand and the therapist stealing her lover from her  She believes that people are withholding information from her about some kind of terrible disease she thing she has  Perceptual Disturbances: None and does not appear responding to internal stimuli   Risk Potential: Tendency to not care for herself if she goes off treatment   Sensorium:  person, place, time/date, situation, day of week, month of year and time   Cognition:  grossly intact   Consciousness:  alert and awake    Attention: Intact concentration and attention span   Intellect: Considered to be at least of average intelligence   Insight:  limited and in denial   Judgment: poor      Motor Activity: no abnormal movements         Recent Labs:  Results Reviewed     None          I/O Past 24 hours:  No intake/output data recorded  No intake/output data recorded          Assessment / Plan:     [facility]ffect[facility]      Reason for continued inpatient care:  Because of underlying paranoia and refusal to go back to the personal care home and inability to care for herself on her own  Acceptance by patient:  Accepting  Filomena Powell in recovery:  About living in another personal care home once she feels better  Understanding of medications :  Has some understanding  Involved in reintegration process:  Adjusting to the unit  Trusting in relatoinship with psychiatrist:  Trusting    Recommended Treatment:    Medication changes:  1) no changes today since Zoloft was added and Prozac being discontinued  Non-pharmacological treatments  1) continue with  individual therapy group therapy, milieu therapy and occupational therapy and milieu therapy involving multidisciplinary team approach with psychotherapist, case management, nursing, peer support services, art therapist etc using recovery principles and psycho-education about accepting illness and need for treatment   3) encourage to attend to ADLs like taking showers and wearing clean clothes   4) Encourage to attend groups   Safety  1) Safety/communication plan established targeting dynamic risk factors above  Discharge Plan most likely back to the a:  Personal care boarding home with act services once her delusions are managed and mood becomes more stabilized and she becomes more receptive to treatment compliance    Counseling / Coordination of Care    Total floor / unit time spent today 15 minutes  Greater than 50% of total time was spent with the patient and / or family counseling and / or coordination of care  A description of the counseling / coordination of care  Patient's Rights, confidentiality and exceptions to confidentiality, use of automated medical record, [address] [place] [address] staff access to medical record, and consent to treatment reviewed      Amina Aparicio MD

## 2019-11-08 NOTE — ASSESSMENT & PLAN NOTE
Psychiatry Progress Note    Patient again is passive-aggressive refusing showers since last Saturday and does not attend groups claiming that she is not feeling right as usual   She does eat her meals but then goes back to bed and lays down wearing the same clothing and refusing to change at all  She has multiple psychosomatic complaints and excuses and now suspects that the Zoloft has peanut oil in it  She has started taking the Zoloft and claims that she is still depressed but she is able to sit up and talk with me when approached in her room  She continues to have psychosomatic symptoms about not being able to breathe or not able to swallow etc which have not changed and has multiple excuses for not taking showers or going to groups  She is still accuses them of the staff of playing with her medications like being inhalers continues to blame the staff for withholding information as she thinks she may have cancer or some other terrible disease  She continues to believe that that there is a micro chip on her right forearm and still accuses the therapist of stealing her lover who is supposedly a medical doctor here but no one here knows who that medical doctor is   No current suicidal homicidal thoughts intent or plans reported  No overt hallucinations or other delusions reported   No signs or sxs of agranulocytosis or myocarditis or endocarditis and she is moving her bowels so far with no issues   Patient was again reminded to attend to ADLs skills and take showers at least every other day and attend more groups to keep up with her 25% expectation so she can go back to the [facility] personal care home [facility]    Current medications:    Current Facility-Administered Medications:     acetaminophen (TYLENOL) tablet 650 mg, 650 mg, Oral, Q6H PRN, Alirio Frazier MD    albuterol (PROVENTIL HFA,VENTOLIN HFA) inhaler 2 puff, 2 puff, Inhalation, Q4H PRN, Alirio Frazier MD, 2 puff at 10/11/19 0427   aluminum-magnesium hydroxide-simethicone (MYLANTA) 200-200-20 mg/5 mL oral suspension 15 mL, 15 mL, Oral, Q4H PRN, Vincent Olivares MD    ammonium lactate (LAC-HYDRIN) 12 % lotion 1 application, 1 application, Topical, BID PRN, Vincent Olivares MD    benzonatate (TESSALON PERLES) capsule 100 mg, 100 mg, Oral, TID PRN, Vincent Olivares MD    benztropine (COGENTIN) injection 1 mg, 1 mg, Intramuscular, Q8H PRN, Vincent Olivares MD    cloZAPine (CLOZARIL) tablet 25 mg, 25 mg, Oral, BID, Vincent Olivares MD, 25 mg at 11/07/19 2121    cloZAPine (CLOZARIL) tablet 50 mg, 50 mg, Oral, BID, Vincent Olivares MD, 50 mg at 11/07/19 2122    EPINEPHrine PF (ADRENALIN) 1 mg/mL injection 0 15 mg, 0 15 mg, Intramuscular, Once PRN, Vincent Olivares MD    fluticasone-vilanterol (BREO ELLIPTA) 200-25 MCG/INH inhaler 1 puff, 1 puff, Inhalation, Daily, Vincent Olivares MD, 1 puff at 11/07/19 0903    ketotifen (ZADITOR) 0 025 % ophthalmic solution 1 drop, 1 drop, Right Eye, BID PRN, Vincent Olivares MD    levothyroxine tablet 125 mcg, 125 mcg, Oral, Early Morning, Vincent Olivares MD, 125 mcg at 11/08/19 0547    magnesium hydroxide (MILK OF MAGNESIA) 400 mg/5 mL oral suspension 30 mL, 30 mL, Oral, Daily PRN, Vincent Olivares MD    montelukast (SINGULAIR) tablet 10 mg, 10 mg, Oral, HS, Vincent Olivares MD, 10 mg at 11/07/19 2121    OLANZapine (ZyPREXA) IM injection 5 mg, 5 mg, Intramuscular, Q8H PRN, Vincent Olivares MD    OLANZapine (ZyPREXA) tablet 5 mg, 5 mg, Oral, Q8H PRN, Vincent Olivares MD    ondansetron (ZOFRAN-ODT) dispersible tablet 4 mg, 4 mg, Oral, Q6H PRN, Vincent Olivares MD    pantoprazole (PROTONIX) EC tablet 40 mg, 40 mg, Oral, Early Morning, Vincent Olivares MD, 40 mg at 11/08/19 0547    polyethylene glycol (MIRALAX) packet 17 g, 17 g, Oral, Daily PRN, Vincent Olivares MD    polyvinyl alcohol (LIQUIFILM TEARS) 1 4 % ophthalmic solution 1 drop, 1 drop, Both Eyes, Q3H PRN, Vincent Olivares MD    sertraline (ZOLOFT) tablet 50 mg, 50 mg, Oral, Daily, Vincent Olivares MD, 50 mg at 11/07/19 0904    theophylline (JEF-24) 24 hr capsule 200 mg, 200 mg, Oral, Daily, Amina Aparicio MD, 200 mg at 11/07/19 0904    tiotropium (SPIRIVA) capsule for inhaler 18 mcg, 18 mcg, Inhalation, Daily, Amina Aparicio MD, 18 mcg at 11/07/19 0903    traZODone (DESYREL) tablet 25 mg, 25 mg, Oral, HS PRN, Amina Aparicio MD  Justification if on more than two antipsychotics:  Only on his clozapine  Side effects if any:  None    Risks , benefits, side effects and precautions of medications discussed with patient and reminded patient to let us know any problems with medications     Objective:     Vital Signs:  Vitals:    11/06/19 1900 11/07/19 0730 11/07/19 1600 11/07/19 1922   BP: 105/61 100/65 95/61 96/60   BP Location: Left arm Right arm Left arm Left arm   Pulse: 79 76 97 86   Resp: 19 18 16 15   Temp: 98 3 °F (36 8 °C) 97 9 °F (36 6 °C) (!) 97 2 °F (36 2 °C) 97 6 °F (36 4 °C)   TempSrc: Temporal  Temporal Temporal   SpO2: 95% 95% 93% 93%   Weight:       Height:         Appearance:  age appropriate, casually dressed and overweight older than stated age, uncombed long grey hair, poorly groomed disheveled unkempt with a body odor refusing to get up or go to program   Behavior:  evasive and guarded and suspicious but with good eye contact and preoccupied and being passive-aggressive and disheveled and unkept   Speech:  normal pitch and normal volume but circumstantial and tangential   Mood:  anxious and dysthymic   Affect:  mood-congruent, elated and entitled at times   Thought Process:  goal directed and illogical slightly pressured and tangential and talks as if in a court of law being stilted off and on   Thought Content:  delusions  grandiose and somatic type about not being able to breathe despite being on nasal oxygen and paranoid about people out to harm her and Atrovent inhaler tainted with peanut oil  No current suicidal homicidal thoughts intent or plans reported  No phobias obsessions or compulsions reported  Somatic delusions about having cancer or terrible illnesses like cancer or having a micro chip on her hand and the therapist stealing her lover from her  She believes that people are withholding information from her about some kind of terrible disease she thing she has  Perceptual Disturbances: None and does not appear responding to internal stimuli   Risk Potential: Tendency to not care for herself if she goes off treatment   Sensorium:  person, place, time/date, situation, day of week, month of year and time   Cognition:  grossly intact   Consciousness:  alert and awake    Attention: Intact concentration and attention span   Intellect: Considered to be at least of average intelligence   Insight:  limited and in denial   Judgment: poor      Motor Activity: no abnormal movements         Recent Labs:  Results Reviewed     None          I/O Past 24 hours:  No intake/output data recorded  No intake/output data recorded          Assessment / Plan:     [facility]oaffective [facility]      Reason for continued inpatient care:  Because of underlying paranoia and refusal to go back to the personal care home and inability to care for herself on her own  Acceptance by patient:  Accepting  Claude Ground in recovery:  About living in another personal care home once she feels better  Understanding of medications :  Has some understanding  Involved in reintegration process:  Adjusting to the unit  Trusting in relatoinship with psychiatrist:  Trusting    Recommended Treatment:    Medication changes:  1) no changes today since Zoloft was added and Prozac being discontinued  Non-pharmacological treatments  1) continue with  individual therapy group therapy, milieu therapy and occupational therapy and milieu therapy involving multidisciplinary team approach with psychotherapist, case management, nursing, peer support services, art therapist etc using recovery principles and psycho-education about accepting illness and need for treatment   3) encourage to attend to ADLs like taking showers and wearing clean clothes   4) Encourage to attend groups   Safety  1) Safety/communication plan established targeting dynamic risk factors above  Discharge Plan most likely back to the a:  Personal care boarding home with act services once her delusions are managed and mood becomes more stabilized and she becomes more receptive to treatment compliance    Counseling / Coordination of Care    Total floor / unit time spent today 15 minutes  Greater than 50% of total time was spent with the patient and / or family counseling and / or coordination of care  A description of the counseling / coordination of care  Patient's Rights, confidentiality and exceptions to confidentiality, use of automated medical record, [facility] staff access to medical record, and consent to treatment reviewed      Deep Durand MD

## 2019-11-08 NOTE — PLAN OF CARE
Problem: PAIN - ADULT  Goal: Verbalizes/displays adequate comfort level or baseline comfort level  Description  Interventions:  - Encourage patient to monitor pain and request assistance  - Assess pain using appropriate pain scale  - Administer analgesics based on type and severity of pain and evaluate response  - Implement non-pharmacological measures as appropriate and evaluate response  - Consider cultural and social influences on pain and pain management  - Notify physician/advanced practitioner if interventions unsuccessful or patient reports new pain  11/8/2019 0054 by Marine Brush LPN  Outcome: Progressing  11/8/2019 0052 by Marine Brush LPN  Outcome: Progressing     Problem: SAFETY ADULT  Goal: Patient will remain free of falls  Description  INTERVENTIONS:  - Assess patient frequently for physical needs  -  Identify cognitive and physical deficits and behaviors that affect risk of falls    -  [place] fall precautions as indicated by assessment   - Educate patient/family on patient safety including physical limitations  - Instruct patient to call for assistance with activity based on assessment  - Modify environment to reduce risk of injury  - Consider OT/PT consult to assist with strengthening/mobility  11/8/2019 0054 by Marine Brush LPN  Outcome: Progressing  11/8/2019 0052 by Marine Brush LPN  Outcome: Progressing     Problem: RESPIRATORY - ADULT  Goal: Achieves optimal ventilation and oxygenation  Description  INTERVENTIONS:  - Assess for changes in respiratory status  - Assess for changes in mentation and behavior  - Position to facilitate oxygenation and minimize respiratory effort  - Oxygen administration by appropriate delivery method based on oxygen saturation (per order) or ABGs  - Initiate smoking cessation education as indicated  - Encourage broncho-pulmonary hygiene including cough, deep breathe, Incentive Spirometry  - Assess the need for suctioning and aspirate as needed  - Assess and instruct to report SOB or any respiratory difficulty  - Respiratory Therapy support as indicated  11/8/2019 0054 by Amelia Bailey LPN  Outcome: Progressing  11/8/2019 0052 by Amelia Bailey LPN  Outcome: Progressing     Problem: SLEEP DISTURBANCE  Goal: Will exhibit normal sleeping pattern  Description  Interventions:  -  Assess the patients sleep pattern, noting recent changes  - Administer medication as ordered  - Decrease environmental stimuli, including noise, as appropriate during the night  - Encourage the patient to actively participate in unit groups and or exercise during the day to enhance ability to achieve adequate sleep at night  - Assess the patient, in the morning, encouraging a description of sleep experience  11/8/2019 0054 by Amelia Bailey LPN  Outcome: Progressing  11/8/2019 0052 by Amelia Bailey LPN  Outcome: Progressing    ~Laying in bed with her eyes closed, breath even and unlabored  No respiratory distress noted  On O2 @1L/m with humidification via NC  Q 15 minutes checks during rounds continues  No behavioral noted thus far  No indication of pain or discomfort noted  No PRN needed for sleep  Mat Cast maintained on ongoing SAFE precaution without  incident on this shift   Will continue to monitor behavior and sleep pattern   ~Maggie has a weeekly schedule lab to be obtain in the morning:CBC w/Diff

## 2019-11-08 NOTE — PROGRESS NOTES
Progress Note - Cathy Livers 1955, 60 y o  female MRN: [id number]    Unit/Bed#: [facility] 110-02 Encounter: [id number]    Primary Care Provider: Donato Jackson PA-C   Date and time admitted to hospital: 7/23/2019  5:30 PM        * Schizoaffective disorder, bipolar type [facility])  Assessment & Plan  Psychiatry Progress Note    Patient again is passive-aggressive refusing showers since last Saturday and does not attend groups claiming that she is not feeling right as usual   She does eat her meals but then goes back to bed and lays down wearing the same clothing and refusing to change at all  She has multiple psychosomatic complaints and excuses and now suspects that the Zoloft has peanut oil in it  She has started taking the Zoloft and claims that she is still depressed but she is able to sit up and talk with me when approached in her room  She continues to have psychosomatic symptoms about not being able to breathe or not able to swallow etc which have not changed and has multiple excuses for not taking showers or going to groups  She is still accuses them of the staff of playing with her medications like being inhalers continues to blame the staff for withholding information as she thinks she may have cancer or some other terrible disease  She continues to believe that that there is a micro chip on her right forearm and still accuses the therapist of stealing her lover who is supposedly a medical doctor here but no one here knows who that medical doctor is   No current suicidal homicidal thoughts intent or plans reported  No overt hallucinations or other delusions reported   No signs or sxs of agranulocytosis or myocarditis or endocarditis and she is moving her bowels so far with no issues     Patient was again reminded to attend to ADLs skills and take showers at least every other day and attend more groups to keep up with her 25% expectation so she can go back to the [employer] personal care home [facility]    Current medications:    Current Facility-Administered Medications:     acetaminophen (TYLENOL) tablet 650 mg, 650 mg, Oral, Q6H PRN, Sindy Day MD    albuterol (PROVENTIL HFA,VENTOLIN HFA) inhaler 2 puff, 2 puff, Inhalation, Q4H PRN, Sindy Day MD, 2 puff at 10/11/19 0424    aluminum-magnesium hydroxide-simethicone (MYLANTA) 200-200-20 mg/5 mL oral suspension 15 mL, 15 mL, Oral, Q4H PRN, Sindy Day MD    ammonium lactate (LAC-HYDRIN) 12 % lotion 1 application, 1 application, Topical, BID PRN, Sindy Day MD    benzonatate (TESSALON PERLES) capsule 100 mg, 100 mg, Oral, TID PRN, Sindy Day MD    benztropine (COGENTIN) injection 1 mg, 1 mg, Intramuscular, Q8H PRN, Sindy Day MD    cloZAPine (CLOZARIL) tablet 25 mg, 25 mg, Oral, BID, Sindy Day MD, 25 mg at 11/07/19 2121    cloZAPine (CLOZARIL) tablet 50 mg, 50 mg, Oral, BID, Sindy Day MD, 50 mg at 11/07/19 2122    EPINEPHrine PF (ADRENALIN) 1 mg/mL injection 0 15 mg, 0 15 mg, Intramuscular, Once PRN, Sindy Day MD    fluticasone-vilanterol (BREO ELLIPTA) 200-25 MCG/INH inhaler 1 puff, 1 puff, Inhalation, Daily, Sindy Day MD, 1 puff at 11/07/19 0903    ketotifen (ZADITOR) 0 025 % ophthalmic solution 1 drop, 1 drop, Right Eye, BID PRN, Sindy Day MD    levothyroxine tablet 125 mcg, 125 mcg, Oral, Early Morning, Sindy Day MD, 125 mcg at 11/08/19 0547    magnesium hydroxide (MILK OF MAGNESIA) 400 mg/5 mL oral suspension 30 mL, 30 mL, Oral, Daily PRN, Sindy Day MD    montelukast (SINGULAIR) tablet 10 mg, 10 mg, Oral, HS, Sindy Day MD, 10 mg at 11/07/19 2121    OLANZapine (ZyPREXA) IM injection 5 mg, 5 mg, Intramuscular, Q8H PRN, Sindy Day MD    OLANZapine (ZyPREXA) tablet 5 mg, 5 mg, Oral, Q8H PRN, Sindy Day MD    ondansetron (ZOFRAN-ODT) dispersible tablet 4 mg, 4 mg, Oral, Q6H PRN, Sindy Day MD    pantoprazole (PROTONIX) EC tablet 40 mg, 40 mg, Oral, Early Morning, Sindy Day MD, 40 mg at 11/08/19 7224    polyethylene glycol (MIRALAX) packet 17 g, 17 g, Oral, Daily PRN, Jennifer Taveras MD    polyvinyl alcohol (LIQUIFILM TEARS) 1 4 % ophthalmic solution 1 drop, 1 drop, Both Eyes, Q3H PRN, Jennifer Taveras MD    sertraline (ZOLOFT) tablet 50 mg, 50 mg, Oral, Daily, Jennifer Taveras MD, 50 mg at 11/07/19 0904    theophylline (JEF-24) 24 hr capsule 200 mg, 200 mg, Oral, Daily, Jennifer Taveras MD, 200 mg at 11/07/19 0904    tiotropium (SPIRIVA) capsule for inhaler 18 mcg, 18 mcg, Inhalation, Daily, Jennifer Taveras MD, 18 mcg at 11/07/19 0903    traZODone (DESYREL) tablet 25 mg, 25 mg, Oral, HS PRN, Jennifer Taveras MD  Justification if on more than two antipsychotics:  Only on his clozapine  Side effects if any:  None    Risks , benefits, side effects and precautions of medications discussed with patient and reminded patient to let us know any problems with medications     Objective:     Vital Signs:  Vitals:    11/06/19 1900 11/07/19 0730 11/07/19 1600 11/07/19 1922   BP: 105/61 100/65 95/61 96/60   BP Location: Left arm Right arm Left arm Left arm   Pulse: 79 76 97 86   Resp: 19 18 16 15   Temp: 98 3 °F (36 8 °C) 97 9 °F (36 6 °C) (!) 97 2 °F (36 2 °C) 97 6 °F (36 4 °C)   TempSrc: Temporal  Temporal Temporal   SpO2: 95% 95% 93% 93%   Weight:       Height:         Appearance:  age appropriate, casually dressed and overweight older than stated age, uncombed long grey hair, poorly groomed disheveled unkempt with a body odor    Behavior:  evasive and guarded and suspicious but with good eye contact and preoccupied and being passive-aggressive not cooperative fully   Speech:  normal pitch and normal volume but circumstantial    Mood:  anxious and dysthymic at times irritated   Affect:  mood-congruent, sad and entitled at times   Thought Process:  goal directed and illogical slightly pressured and tangential and talks as if in a court of law being stilted    Thought Content:  delusions  grandiose and somatic type about not being able to breathe despite being on nasal oxygen and paranoid about people out to harm her and Atrovent inhaler tainted with peanut oil and dying from low sugar  No current suicidal homicidal thoughts intent or plans reported  No phobias obsessions or compulsions reported  Somatic delusions about having cancer or terrible illnesses like cancer or having a micro chip on her hand and the therapist stealing her lover from her  She believes that people are withholding information from her about some kind of terrible disease she thinks she has  Perceptual Disturbances: None and does not appear responding to internal stimuli   Risk Potential: Tendency to not care for herself if she goes off treatment   Sensorium:  person, place, time/date, situation, day of week, month of year and time   Cognition:  grossly intact   Consciousness:  alert and awake    Attention: Intact concentration and attention span   Intellect: Considered to be at least of average intelligence   Insight:  limited and in denial   Judgment: poor      Motor Activity: no abnormal movements         Recent Labs:  Results Reviewed     None          I/O Past 24 hours:  No intake/output data recorded  No intake/output data recorded          Assessment / Plan:     Schizoaffective disorder, bipolar [facility]      Reason for continued inpatient care:  Because of underlying paranoia and refusal to go back to the personal care home and inability to care for herself on her own  Acceptance by patient:  Accepting  Clara Saenz in recovery:  About living in another personal care home once she feels better  Understanding of medications :  Has some understanding  Involved in reintegration process:  Adjusting to the unit  Trusting in relatoinship with psychiatrist:  Trusting    Recommended Treatment:    Medication changes:  1) no changes today since Zoloft was added and Prozac being discontinued  Non-pharmacological treatments  1) continue with  individual therapy group therapy, milieu therapy and occupational therapy and milieu therapy involving multidisciplinary team approach with psychotherapist, case management, nursing, peer support services, art therapist etc using recovery principles and psycho-education about accepting illness and need for treatment   3) encourage to attend to ADLs like taking showers and wearing clean clothes   4) Encourage to attend groups   Safety  1) Safety/communication plan established targeting dynamic risk factors above  Discharge Plan most likely back to the a:  Personal care boarding home with act services once her delusions are managed and mood becomes more stabilized and she becomes more receptive to treatment compliance    Counseling / Coordination of Care    Total floor / unit time spent today 15 minutes  Greater than 50% of total time was spent with the patient and / or family counseling and / or coordination of care  A description of the counseling / coordination of care  Patient's Rights, confidentiality and exceptions to confidentiality, use of automated medical record, [facility] staff access to medical record, and consent to treatment reviewed      Jennifer Taveras MD

## 2019-11-08 NOTE — PLAN OF CARE
Problem: Alteration in Thoughts and Perception  Goal: Verbalize thoughts and feelings  Description  Interventions:  - Promote a nonjudgmental and trusting relationship with the patient through active listening and therapeutic communication  - Assess patient's level of functioning, behavior and potential for risk  - Engage patient in 1 on 1 interactions for a minimum of 15 minutes each session  - Encourage patient to express fears, feelings, frustrations, and discuss symptoms    - [place] patient to reality, help patient recognize reality-based thinking   - Administer medications as ordered and assess for potential side effects  - Provide the patient education related to the signs and symptoms of the illness and desired effects of prescribed medications  Outcome: Progressing  Goal: Agree to be compliant with medication regime, as prescribed and report medication side effects  Description  Interventions:  - Offer appropriate PRN medication and supervise ingestion; conduct aims, as needed   Outcome: Progressing     Problem: Risk for Self Injury/Neglect  Goal: Refrain from harming self  Description  Interventions:  - Monitor patient closely, per order  - Develop a trusting relationship  - Supervise medication ingestion, monitor effects and side effects   Outcome: Progressing     Problem: Alteration in Orientation  Goal: Interact with staff daily  Description  Interventions:  - Assess and re-assess patient's level of orientation  - Engage patient in 1 on 1 interactions, daily, for a minimum of 15 minutes   - Establish rapport/trust with patient   Outcome: Progressing     Problem: SAFETY ADULT  Goal: Patient will remain free of falls  Description  INTERVENTIONS:  - Assess patient frequently for physical needs  -  Identify cognitive and physical deficits and behaviors that affect risk of falls    -  [place] fall precautions as indicated by assessment   - Educate patient/family on patient safety including physical limitations  - Instruct patient to call for assistance with activity based on assessment  - Modify environment to reduce risk of injury  - Consider OT/PT consult to assist with strengthening/mobility  Outcome: Progressing     Problem: Alteration in Thoughts and Perception  Goal: Complete daily ADLs, including personal hygiene independently, as able  Description  Interventions:  - Observe, teach, and assist patient with ADLS  - Monitor and promote a balance of rest/activity, with adequate nutrition and elimination   Outcome: Not Progressing     Problem: Depression  Goal: Refrain from isolation  Description  Interventions:  - Develop a trusting relationship   - Encourage socialization   Outcome: Not Ileana Abt has been in her room most of the shift  Only comes out for brief periods of time  Comes out with prompting for medication and meal  Social with select peers  Cooperative upon approach by staff  Able to make needs known  Took pills with water without difficulty  Ate 30% of her meal and drank an Ensure  Stated that she "could not swallow the mashed potatoes " Went back to her room after eating  Naps at times  Did not attend any groups due to being in bed  Ate snack and took HS medication  Oxygen saturation 92% prior to oxygen 1 liter via nasal cannula applied  Continue to monitor  Precautions maintained

## 2019-11-08 NOTE — PROGRESS NOTES
11/08/19 1100   Activity/Group Checklist   Group Other (Comment)  (IMR - Weekly Goal Review)   Attendance Did not attend     Patient was encouraged to attend group but she reported that she was not feeling well  Patient declined

## 2019-11-08 NOTE — PLAN OF CARE
Problem: Alteration in Thoughts and Perception  Goal: Agree to be compliant with medication regime, as prescribed and report medication side effects  Description  Interventions:  - Offer appropriate PRN medication and supervise ingestion; conduct aims, as needed   Outcome: Progressing  Goal: Attend and participate in unit activities, including therapeutic, recreational, and educational groups  Description  Interventions:  - Provide therapeutic and educational activities daily, encourage attendance and participation, and document same in the medical record     CERTIFIED PEER SPECIALIST INTERVENTIONS:    Complete peer assessment with patient to assess their needs and identify their goals to complete while in the recovery program as well as once discharged into the community  Patient will complete WRAP Plan, Crisis Plan and [facility] [employer]  Patient will attend 50% of groups offered on the unit  Patient will complete a goal card weekly  Outcome: Not Progressing  Goal: Complete daily ADLs, including personal hygiene independently, as able  Description  Interventions:  - Observe, teach, and assist patient with ADLS  - Monitor and promote a balance of rest/activity, with adequate nutrition and elimination   Outcome: Jannette Garcia has been isolative to her room, napping throughout the shift and remains somatically preoccupied regarding her food, O2 saturation and requesting other tests to be done  Ate breakfast today but chose to sleep through lunch  No groups attended  Hygiene remains poor, body odor and is disheveled in appearance  Continues to be isolative to her room  Will continue to monitor for changes

## 2019-11-08 NOTE — PROGRESS NOTES
Pharmacy Clozapine Monitoring Progress Note     Veronica Romero is receiving a total daily dose of 150 mg of clozapine divided as 75mg at 1600 and 75mg at 2000  Pharmacist Recommendations Based on Assessment and Plan (below):    1  Patient is Clozapine-REMS eligible, ANC was updated, with weekly monitoring frequency and the next [address] is due on 11/15/2019       2  Recommend prophylactic bowel regimen  3  Recommend repeat ECG for myocarditis monitoring and QTC prolongation due to antipsychotic  Assessment/Plan:    Phase of Treatment:     Current phase of treatment is initation/titration  Patient Clozapine REMS Eligibility:     Patient is eligible to receive clozapine and the [address] monitoring frequency is weekly per clozapine REMS  The patient's latest [address] value has been updated into the Clozapine-REMS program          ANC and Clozapine Level (if available)     The most recent [address] was   Lab Results   Component Value Date    NEUTROABS 3 80 11/08/2019    and the next lab is due on 11/15/19  Last Clozapine level (if available):    0   Lab Value Date/Time    CLOZAPINE 324 (L) 08/16/2019 1131     0   Lab Value Date/Time    NCLOZIP 207 08/16/2019 1131           Monitoring Parameters for Clozapine:     Clozapine Adverse Effect Suggested Monitoring Patient's Current Status    Agranulocytosis ANC baseline and then repeat weekly for first 6 months and every 2 weeks for the second 6 months  Maintenance after one year of therapy every month  The most recent [address] was   Lab Results   Component Value Date    NEUTROABS 3 80 11/08/2019       This ANC is considered normal range (ANC >/= 1500/mcL)  Continue current treatment and monitoring course     Respiratory Depression Please ensure patient is not on concomitant respiratory lowering medications such as benzodiazepines, sedative hypnotics (eg  zolpidem) This patient is not on respiratory depression lowering medications   Myocarditis Baseline and weekly EKG, CRP, BNP, and troponin  Weekly symptomatic complaints of fatigue, dyspnea, tachycardia, chest pain, palpitations, and fever for the first 4 weeks  Last EKG Results on  8/21/19 showed:     "Sinus tachycardia  Left axis deviation  Left anterior fascicular block  Low voltage QRS  Abnormal ECG  When compared with ECG of 13-AUG-2019 13:45,  Nonspecific T wave abnormality now evident in Lateral leads  Confirmed by Halie Campuzano ([id number]) on 8/22/2019 8:20:02 AM"      Last QTC value was:  0   Lab Value Date/Time    QTCINT 427 08/21/2019 1133       Last BNP was: No results found for: NTBNP    Last Troponin was:  0   Lab Value Date/Time    TROPONINI <0 01 05/01/2019 1318    TROPONINI <0 04 05/10/2015 1948        Orthostatic hypotension/  bradycardia Blood pressure and vital signs      Monitor blood pressure and orthostatic hypotension at least twice daily upon initiation and continue to follow at least once daily afterwards  Patient's last recorded vital signs:       BP 94/62 (BP Location: Left arm)   Pulse 71   Temp 98 6 °F (37 °C) (Temporal)   Resp 18   Ht 5' 4" (1 626 m)   Wt 67 8 kg (149 lb 6 4 oz)   SpO2 93%   BMI 25 64 kg/m²             Constipation (bowel obstruction due to high anticholinergic clozapine burden) Assess at baseline and weekly during first four months of therapy  Docusate 100mg BID and Miralax 17 grams daily recommended initially  Prophylactic bowel regimen not ordered and it is recommended to order a standing bowel regimen to reduce risk of bowel obstruction associated with clozapine therapy  Sialorrhea Assess at baseline and weekly during first four months of therapy  May be managed using sublingual anticholinergic preparations (atropine 1% eye drops)  Patient is not experiencing sialorrhea  This will continue to be monitored           Please note that per current REMS protocol the provider can submit a treatment rationale that justifies clozapine treatment even if patient parameters are outside of REMS recommendations  The Clozapine REMS is an FDA-mandated program implemented by the manufacturers of clozapine  (Cost Effective Data com cy  com/CpmgClozapineUI/home  u)  Pharmacy will continue to follow patient with team, thank you    Electronically signed by: Anson Castañeda, PharmD, [facility] [address], Clinical Pharmacist - Psychiatry

## 2019-11-08 NOTE — PROGRESS NOTES
11/08/19 0900   Team Meeting   Meeting Type Daily Rounds   Team Members Present   Team Members Present Physician;Nurse;; Other (Discipline and Name)   Physician Team Member Dr Rosa Jimenez Team Member Jewels Snyder RN   Care Management Team Member Brenda So   Other (Discipline and Name) Cesar Bhatt LCSW     Patient still presents somatic  Last shower was on 11/02/19  No groups  Slept

## 2019-11-09 NOTE — PROGRESS NOTES
Psychiatry Progress Note    Subjective: Interval History     Patient isolative to her room  Patient continues to be disheveled in appearance  Has not attended to her hygiene and almost 1 week  Patient this morning continues to be somatically preoccupied during assessment  Reporting that she is not feeling well  Reports that she had a endoscopy completed which shows a hiatal hernia and ever since now she has difficulty eating  Staff does confirm that patient has not been eating much in the way of solid food however has been drinking all fluids and did consume yogurt yesterday  Continues to be paranoid about her medications  Has been compliant with encouragement and support of staff      Behavior over the last 24 hours:  unchanged  Sleep: normal  Appetite: normal  Medication side effects: No  ROS: no complaints    Current medications:    Current Facility-Administered Medications:     acetaminophen (TYLENOL) tablet 650 mg, 650 mg, Oral, Q6H PRN, Dalton Garner MD    albuterol (PROVENTIL HFA,VENTOLIN HFA) inhaler 2 puff, 2 puff, Inhalation, Q4H PRN, Dalton Garner MD, 2 puff at 10/11/19 0424    aluminum-magnesium hydroxide-simethicone (MYLANTA) 200-200-20 mg/5 mL oral suspension 15 mL, 15 mL, Oral, Q4H PRN, Dalton Garner MD    ammonium lactate (LAC-HYDRIN) 12 % lotion 1 application, 1 application, Topical, BID PRN, Dalton Garner MD    benzonatate (TESSALON PERLES) capsule 100 mg, 100 mg, Oral, TID PRN, Dalton Garner MD    benztropine (COGENTIN) injection 1 mg, 1 mg, Intramuscular, Q8H PRN, Dalton Garner MD    cloZAPine (CLOZARIL) tablet 25 mg, 25 mg, Oral, BID, Dalton Garner MD, 25 mg at 11/08/19 2119    cloZAPine (CLOZARIL) tablet 50 mg, 50 mg, Oral, BID, Dalton Garner MD, 50 mg at 11/08/19 2118    EPINEPHrine PF (ADRENALIN) 1 mg/mL injection 0 15 mg, 0 15 mg, Intramuscular, Once PRN, Dalton Garner MD    fluticasone-vilanterol (BREO ELLIPTA) 200-25 MCG/INH inhaler 1 puff, 1 puff, Inhalation, Daily, Zetta Killer, MD, 1 puff at 11/09/19 0902    ketotifen (ZADITOR) 0 025 % ophthalmic solution 1 drop, 1 drop, Right Eye, BID PRN, Meredith Wesley MD    levothyroxine tablet 125 mcg, 125 mcg, Oral, Early Morning, Meredith Wesley MD, 125 mcg at 11/09/19 0542    magnesium hydroxide (MILK OF MAGNESIA) 400 mg/5 mL oral suspension 30 mL, 30 mL, Oral, Daily PRN, Meredith Wesley MD    montelukast (SINGULAIR) tablet 10 mg, 10 mg, Oral, HS, Meredith Wesley MD, 10 mg at 11/07/19 2121    OLANZapine (ZyPREXA) IM injection 5 mg, 5 mg, Intramuscular, Q8H PRN, Meredith Wesley MD    OLANZapine (ZyPREXA) tablet 5 mg, 5 mg, Oral, Q8H PRN, Meredith Wesley MD    ondansetron (ZOFRAN-ODT) dispersible tablet 4 mg, 4 mg, Oral, Q6H PRN, Meredith Wesley MD    pantoprazole (PROTONIX) EC tablet 40 mg, 40 mg, Oral, Early Morning, Meredith Wesley MD, 40 mg at 11/09/19 0542    polyethylene glycol (MIRALAX) packet 17 g, 17 g, Oral, Daily PRN, Meredith Wesley MD    polyvinyl alcohol (LIQUIFILM TEARS) 1 4 % ophthalmic solution 1 drop, 1 drop, Both Eyes, Q3H PRN, Meredith Wesley MD    sertraline (ZOLOFT) tablet 50 mg, 50 mg, Oral, Daily, Meredith Wesley MD, 50 mg at 11/09/19 0901    theophylline (JEF-24) 24 hr capsule 200 mg, 200 mg, Oral, Daily, Meredith Wesley MD, 200 mg at 11/09/19 0901    tiotropium (SPIRIVA) capsule for inhaler 18 mcg, 18 mcg, Inhalation, Daily, Meredith Wesley MD, 18 mcg at 11/09/19 0902    traZODone (DESYREL) tablet 25 mg, 25 mg, Oral, HS PRN, Meredith Wesley MD    Current Problem List:    Patient Active Problem List   Diagnosis    Sepsis ([address]    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[address] )    Tobacco use disorder, continuous    Bipolar disorder ([address] )    Left hip pain    Lactic acidosis    Hypokalemia    Hypomagnesemia    Compression fracture of L4 lumbar vertebra    Thoracic compression fracture ([address]    Ventral hernia    Parapneumonic effusion    Acute on chronic respiratory failure with hypoxia ([address] )    Chronic respiratory failure ([address] )    Hypophosphatemia    Elevated MCV    Schizoaffective disorder, bipolar type ([facility] )    Acquired hypothyroidism    Gastroesophageal reflux disease without esophagitis    Abnormal CT of the chest    Excessive cerumen in left ear canal    Lipoma of right upper extremity    Polydipsia    Localized swelling of both lower legs    Noncompliant with deep vein thrombosis (DVT) prophylaxis    Allergic conjunctivitis of right eye    At risk for aspiration       Problem list reviewed 11/09/19     Objective:     Vital Signs:  Vitals:    11/08/19 1610 11/08/19 1630 11/08/19 1900 11/08/19 2130   BP: (!) 78/54 90/62 94/56    BP Location: Left arm Right arm Left arm    Pulse: 91  92    Resp: 18  15    Temp: (!) 97 4 °F (36 3 °C)  97 7 °F (36 5 °C)    TempSrc: Temporal  Temporal    SpO2: 97%  94% 94%   Weight:       Height:             Appearance:  age appropriate, casually dressed and disheveled   Behavior:  normal   Speech:  normal volume   Mood:  anxious   Affect:  constricted   Thought Process:  normal   Thought Content:  delusions  persecutory   Perceptual Disturbances: None   Risk Potential: none   Sensorium:  person, place and time   Cognition:  intact   Consciousness:  alert and awake    Attention: attention span and concentration were age appropriate   Intellect: average   Insight:  poor   Judgment: poor      Motor Activity: no abnormal movements       I/O Past 24 hours:  No intake/output data recorded  No intake/output data recorded  Labs:  Reviewed 11/09/19    Progress Toward Goals:  unchanged    Assessment / Plan:     Schizoaffective disorder, bipolar type ([facility] )    Recommended Treatment:      Medication changes:  1) continue current treatment plan    Non-pharmacological treatments  1) Continue with group therapy, milieu therapy and occupational therapy  Safety  1) Safety/communication plan established targeting dynamic risk factors above      2) Risks, benefits, and possible side effects of medications explained to patient and patient verbalizes understanding  Counseling / Coordination of Care    Total floor / unit time spent today 20 minutes  Greater than 50% of total time was spent with the patient and / or family counseling and / or coordination of care  A description of the counseling / coordination of care  Patient's Rights, confidentiality and exceptions to confidentiality, use of automated medical record, Jeb Patrick staff access to medical record, and consent to treatment reviewed      Pacheco Mchugh PA-C

## 2019-11-09 NOTE — PLAN OF CARE
Problem: Alteration in Thoughts and Perception  Goal: Verbalize thoughts and feelings  Description  Interventions:  - Promote a nonjudgmental and trusting relationship with the patient through active listening and therapeutic communication  - Assess patient's level of functioning, behavior and potential for risk  - Engage patient in 1 on 1 interactions for a minimum of 15 minutes each session  - Encourage patient to express fears, feelings, frustrations, and discuss symptoms    - [place] patient to reality, help patient recognize reality-based thinking   - Administer medications as ordered and assess for potential side effects  - Provide the patient education related to the signs and symptoms of the illness and desired effects of prescribed medications  Outcome: Not Progressing  Goal: Agree to be compliant with medication regime, as prescribed and report medication side effects  Description  Interventions:  - Offer appropriate PRN medication and supervise ingestion; conduct aims, as needed   Outcome: Progressing  Goal: Attend and participate in unit activities, including therapeutic, recreational, and educational groups  Description  Interventions:  - Provide therapeutic and educational activities daily, encourage attendance and participation, and document same in the medical record     CERTIFIED PEER SPECIALIST INTERVENTIONS:    Complete peer assessment with patient to assess their needs and identify their goals to complete while in the recovery program as well as once discharged into the community  Patient will complete WRAP Plan, Crisis Plan and 5 Life Domains  Patient will attend 50% of groups offered on the unit  Patient will complete a goal card weekly      Outcome: Not Progressing  Goal: Complete daily ADLs, including personal hygiene independently, as able  Description  Interventions:  - Observe, teach, and assist patient with ADLS  - Monitor and promote a balance of rest/activity, with adequate nutrition and elimination   Outcome: Progressing     Problem: Risk for Self Injury/Neglect  Goal: Complete daily ADLs, including personal hygiene independently, as able  Description  Interventions:  - Observe, teach, and assist patient with ADLS  - Monitor and promote a balance of rest/activity, with adequate nutrition and elimination  Outcome: Not Progressing     [facility] has been isolative and somatically preoccupied, requesting for her blood pressure to be taken, as well as her lab work results and she received prn zofran related to her reporting that she was nauseous  No further complaints of nausea at this time  Did not attend groups today, napped for most of the shift  Hygiene is poor and she is still not willing to do so even with staff encouragement  Will continue to monitor

## 2019-11-09 NOTE — PLAN OF CARE
Problem: PAIN - ADULT  Goal: Verbalizes/displays adequate comfort level or baseline comfort level  Description  Interventions:  - Encourage patient to monitor pain and request assistance  - Assess pain using appropriate pain scale  - Administer analgesics based on type and severity of pain and evaluate response  - Implement non-pharmacological measures as appropriate and evaluate response  - Consider cultural and social influences on pain and pain management  - Notify physician/advanced practitioner if interventions unsuccessful or patient reports new pain  Outcome: Progressing     Problem: SAFETY ADULT  Goal: Patient will remain free of falls  Description  INTERVENTIONS:  - Assess patient frequently for physical needs  -  Identify cognitive and physical deficits and behaviors that affect risk of falls    -  [place] fall precautions as indicated by assessment   - Educate patient/family on patient safety including physical limitations  - Instruct patient to call for assistance with activity based on assessment  - Modify environment to reduce risk of injury  - Consider OT/PT consult to assist with strengthening/mobility  Outcome: Progressing     Problem: RESPIRATORY - ADULT  Goal: Achieves optimal ventilation and oxygenation  Description  INTERVENTIONS:  - Assess for changes in respiratory status  - Assess for changes in mentation and behavior  - Position to facilitate oxygenation and minimize respiratory effort  - Oxygen administration by appropriate delivery method based on oxygen saturation (per order) or ABGs  - Initiate smoking cessation education as indicated  - Encourage broncho-pulmonary hygiene including cough, deep breathe, Incentive Spirometry  - Assess the need for suctioning and aspirate as needed  - Assess and instruct to report SOB or any respiratory difficulty  - Respiratory Therapy support as indicated  Outcome: Progressing     Problem: SLEEP DISTURBANCE  Goal: Will exhibit normal sleeping pattern  Description  Interventions:  -  Assess the patients sleep pattern, noting recent changes  - Administer medication as ordered  - Decrease environmental stimuli, including noise, as appropriate during the night  - Encourage the patient to actively participate in unit groups and or exercise during the day to enhance ability to achieve adequate sleep at night  - Assess the patient, in the morning, encouraging a description of sleep experience  Outcome: Progressing    Laying in bed with her eyes closed, breath even and unlabored  No respiratory distress noted  On O2 @1L/m with humidification via NC  Q 15 minutes checks during rounds continues  No behavioral noted thus far  No indication of pain or discomfort noted  No PRN needed for sleep  Davison Segal maintained on ongoing SAFE precaution without  incident on this shift  Will continue to monitor behavior and sleep pattern

## 2019-11-09 NOTE — PLAN OF CARE
Problem: Alteration in Thoughts and Perception  Goal: Verbalize thoughts and feelings  Description  Interventions:  - Promote a nonjudgmental and trusting relationship with the patient through active listening and therapeutic communication  - Assess patient's level of functioning, behavior and potential for risk  - Engage patient in 1 on 1 interactions for a minimum of 15 minutes each session  - Encourage patient to express fears, feelings, frustrations, and discuss symptoms    - [place] patient to reality, help patient recognize reality-based thinking   - Administer medications as ordered and assess for potential side effects  - Provide the patient education related to the signs and symptoms of the illness and desired effects of prescribed medications  Outcome: Progressing     Problem: RESPIRATORY - ADULT  Goal: Achieves optimal ventilation and oxygenation  Description  INTERVENTIONS:  - Assess for changes in respiratory status  - Assess for changes in mentation and behavior  - Position to facilitate oxygenation and minimize respiratory effort  - Oxygen administration by appropriate delivery method based on oxygen saturation (per order) or ABGs  - Initiate smoking cessation education as indicated  - Encourage broncho-pulmonary hygiene including cough, deep breathe, Incentive Spirometry  - Assess the need for suctioning and aspirate as needed  - Assess and instruct to report SOB or any respiratory difficulty  - Respiratory Therapy support as indicated  Outcome: Progressing     Problem: Alteration in Thoughts and Perception  Goal: Agree to be compliant with medication regime, as prescribed and report medication side effects  Description  Interventions:  - Offer appropriate PRN medication and supervise ingestion; conduct aims, as needed   Outcome: Not Progressing  Goal: Attend and participate in unit activities, including therapeutic, recreational, and educational groups  Description  Interventions:  - Provide therapeutic and educational activities daily, encourage attendance and participation, and document same in the medical record     CERTIFIED PEER SPECIALIST INTERVENTIONS:    Complete peer assessment with patient to assess their needs and identify their goals to complete while in the recovery program as well as once discharged into the community  Patient will complete WRAP Plan, Crisis Plan and 5 Life Domains  Patient will attend 50% of groups offered on the unit  Patient will complete a goal card weekly  Outcome: Not Progressing  Goal: Complete daily ADLs, including personal hygiene independently, as able  Description  Interventions:  - Observe, teach, and assist patient with ADLS  - Monitor and promote a balance of rest/activity, with adequate nutrition and elimination   Outcome: Not Progressing     Problem: Ineffective Coping  Goal: Demonstrates healthy coping skills  Outcome: Not Progressing     Problem: Depression  Goal: Refrain from isolation  Description  Interventions:  - Develop a trusting relationship   - Encourage socialization   Outcome: Not Progressing     [address] continues to report feeling more sick; clarifies that means more depressed & anxious  She feels these symptoms render her more unable to function, hence, her neglect of any hygiene X 6 days now  Hair matted, foul mouth odor, dirty clothes  Says had no appetite for solid food @ meal; drank all fluids (juice, milk, coffee, sherbet & an Ensure)  Had 2 yogurts for HS snack  Continues to suspect her medicines of worsening her condition (I e  Low BP early in shift which she blamed on Zoloft was remedied by drinking 2 of 12oz cups of water coupled w/moving about a bit from room to dining room), specifically suspicious of the Zoloft & the Singulair  Refused the HS Singulair because believes "it is not mixing well" w/the other medicines  Did not attend PM Group(s) offered   Did use the Incentive Spirometer reaching volumes only of 1000ml; can usually get 1100-1250ml  Isolates in room in bed most of free time  Is wearing her QHS humidified nasal O2 @ 1L now for bed

## 2019-11-10 NOTE — PROGRESS NOTES
[address] did not attend the PM Group  Did come out for HS snack of 2 yogurts  Took her HS Clozaril, but, refused the Singulair  "I feel better without it " Used the Incentive spirometer this shift reaching volumes of 1000-1100ml  Wearing her QHS humidified nasal O2 @ 1L now for bed

## 2019-11-10 NOTE — PLAN OF CARE
Problem: Alteration in Thoughts and Perception  Goal: Verbalize thoughts and feelings  Description  Interventions:  - Promote a nonjudgmental and trusting relationship with the patient through active listening and therapeutic communication  - Assess patient's level of functioning, behavior and potential for risk  - Engage patient in 1 on 1 interactions for a minimum of 15 minutes each session  - Encourage patient to express fears, feelings, frustrations, and discuss symptoms    - [place] patient to reality, help patient recognize reality-based thinking   - Administer medications as ordered and assess for potential side effects  - Provide the patient education related to the signs and symptoms of the illness and desired effects of prescribed medications  Outcome: Progressing  Goal: Agree to be compliant with medication regime, as prescribed and report medication side effects  Description  Interventions:  - Offer appropriate PRN medication and supervise ingestion; conduct aims, as needed   Outcome: Progressing  Goal: Complete daily ADLs, including personal hygiene independently, as able  Description  Interventions:  - Observe, teach, and assist patient with ADLS  - Monitor and promote a balance of rest/activity, with adequate nutrition and elimination   Outcome: Progressing     Problem: Ineffective Coping  Goal: Demonstrates healthy coping skills  Outcome: Not Progressing     Problem: Depression  Goal: Refrain from isolation  Description  Interventions:  - Develop a trusting relationship   - Encourage socialization   Outcome: Not Progressing     [address], w/reluctance & an attempt to procrastinate until tomorrow, did get up to do oral care, shower & groom after staff insisted she attend these tasks & reassured her of their presence & help throughout process  She did most of the tasks herself w/staff assuring her of no need to rush or feel pressured   Did have help w/hair to comb out matting, shampoo, dry, braid it  Afterwards exclaimed, "I'm glad it's over!", but, that was glad of the accomplishment  Came to supper expressing an interest in the meal, but, took only fluids, Ensure & bites of the entree'  "I just can't eat  The people here; look @ the way they chew " Was encouraged to take her time, wait them out  "I'm too relaxed after the shower to eat " Did come up for supper medicine  Has vague complaint of "nausea"  Remains fixated on somatic concerns, feeling depressed & anxious  Isolates in room in bed in free time

## 2019-11-10 NOTE — PLAN OF CARE
Problem: Alteration in Thoughts and Perception  Goal: Verbalize thoughts and feelings  Description  Interventions:  - Promote a nonjudgmental and trusting relationship with the patient through active listening and therapeutic communication  - Assess patient's level of functioning, behavior and potential for risk  - Engage patient in 1 on 1 interactions for a minimum of 15 minutes each session  - Encourage patient to express fears, feelings, frustrations, and discuss symptoms    - [place] patient to reality, help patient recognize reality-based thinking   - Administer medications as ordered and assess for potential side effects  - Provide the patient education related to the signs and symptoms of the illness and desired effects of prescribed medications  Outcome: Progressing  Goal: Agree to be compliant with medication regime, as prescribed and report medication side effects  Description  Interventions:  - Offer appropriate PRN medication and supervise ingestion; conduct aims, as needed   Outcome: Progressing  Goal: Attend and participate in unit activities, including therapeutic, recreational, and educational groups  Description  Interventions:  - Provide therapeutic and educational activities daily, encourage attendance and participation, and document same in the medical record     CERTIFIED PEER SPECIALIST INTERVENTIONS:    Complete peer assessment with patient to assess their needs and identify their goals to complete while in the recovery program as well as once discharged into the community  Patient will complete WRAP Plan, Crisis Plan and 5 Life Domains  Patient will attend 50% of groups offered on the unit  Patient will complete a goal card weekly      Outcome: Not Progressing     Problem: Depression  Goal: Refrain from isolation  Description  Interventions:  - Develop a trusting relationship   - Encourage socialization   Outcome: Not Progressing    Reyes Zendejas has been isolative to her room, napping throughout the shift and remains somatically preoccupied regarding her food, O2 saturation and requesting other tests to be done  Appetite remains intermittently  No groups attended  Interactions are mainly focused around her somatic thoughts,  of education from this writer about her medications in an attempt to lessen her anxiety was ineffective even with staff reinforcement  Continues to be isolative to her room  Will continue to monitor for changes

## 2019-11-10 NOTE — PROGRESS NOTES
Psychiatry Progress Note    Subjective: Interval History     Patient visible on the unit this morning  Patient is sitting up after having breakfast   Continues to be somatically preoccupied during assessment  Reports that she did drink her fluids and eat her cream we in yogurt this morning  Still not consuming as much in the way of solid intake  Patient has continued to remain somatically preoccupied making multiple somatic complaints to staff  Patient endorsing anxiety due to her somatic complaints  Patient denying any depression or suicidal ideations  Patient did take a shower and performed her ADLs last evening with much encouragement support from staff  Did take her Clozaril dosing but refused singular last evening      Behavior over the last 24 hours:  unchanged  Sleep: normal  Appetite: normal  Medication side effects: No  ROS: no complaints    Current medications:    Current Facility-Administered Medications:     acetaminophen (TYLENOL) tablet 650 mg, 650 mg, Oral, Q6H PRN, Manuel Copeland MD    albuterol (PROVENTIL HFA,VENTOLIN HFA) inhaler 2 puff, 2 puff, Inhalation, Q4H PRN, Manuel Copeland MD, 2 puff at 10/11/19 0424    aluminum-magnesium hydroxide-simethicone (MYLANTA) 200-200-20 mg/5 mL oral suspension 15 mL, 15 mL, Oral, Q4H PRN, Manuel Copeland MD    ammonium lactate (LAC-HYDRIN) 12 % lotion 1 application, 1 application, Topical, BID PRN, Manuel Copeland MD    benzonatate (TESSALON PERLES) capsule 100 mg, 100 mg, Oral, TID PRN, Manuel Copeland MD    benztropine (COGENTIN) injection 1 mg, 1 mg, Intramuscular, Q8H PRN, Manuel Copeland MD    cloZAPine (CLOZARIL) tablet 25 mg, 25 mg, Oral, BID, Manuel Copeland MD, 25 mg at 11/09/19 2114    cloZAPine (CLOZARIL) tablet 50 mg, 50 mg, Oral, BID, Manuel Copeland MD, 50 mg at 11/09/19 2115    EPINEPHrine PF (ADRENALIN) 1 mg/mL injection 0 15 mg, 0 15 mg, Intramuscular, Once PRN, Manuel Copeland MD    fluticasone-vilanterol (BREO ELLIPTA) 200-25 MCG/INH inhaler 1 puff, 1 puff, Inhalation, Daily, Jerome Lopez MD, 1 puff at 11/10/19 0820    ketotifen (ZADITOR) 0 025 % ophthalmic solution 1 drop, 1 drop, Right Eye, BID PRN, Jerome Lopez MD    levothyroxine tablet 125 mcg, 125 mcg, Oral, Early Morning, Jerome Lopez MD, 125 mcg at 11/10/19 0545    magnesium hydroxide (MILK OF MAGNESIA) 400 mg/5 mL oral suspension 30 mL, 30 mL, Oral, Daily PRN, Jerome Lopez MD    montelukast (SINGULAIR) tablet 10 mg, 10 mg, Oral, HS, Jerome Lopez MD, 10 mg at 11/07/19 2121    OLANZapine (ZyPREXA) IM injection 5 mg, 5 mg, Intramuscular, Q8H PRN, Jerome Lopez MD    OLANZapine (ZyPREXA) tablet 5 mg, 5 mg, Oral, Q8H PRN, Jerome Lopez MD    ondansetron (ZOFRAN-ODT) dispersible tablet 4 mg, 4 mg, Oral, Q6H PRN, Jerome Lopez MD, 4 mg at 11/09/19 1223    pantoprazole (PROTONIX) EC tablet 40 mg, 40 mg, Oral, Early Morning, Jerome Lopez MD, 40 mg at 11/10/19 0545    polyethylene glycol (MIRALAX) packet 17 g, 17 g, Oral, Daily PRN, Jerome Lopez MD    polyvinyl alcohol (LIQUIFILM TEARS) 1 4 % ophthalmic solution 1 drop, 1 drop, Both Eyes, Q3H PRN, Jerome Lopez MD    sertraline (ZOLOFT) tablet 50 mg, 50 mg, Oral, Daily, Jerome Lopez MD, 50 mg at 11/10/19 7248    theophylline (JEF-24) 24 hr capsule 200 mg, 200 mg, Oral, Daily, Jerome Lopez MD, 200 mg at 11/10/19 0819    tiotropium (SPIRIVA) capsule for inhaler 18 mcg, 18 mcg, Inhalation, Daily, Jerome Lopez MD, 18 mcg at 11/10/19 0820    traZODone (DESYREL) tablet 25 mg, 25 mg, Oral, HS PRN, Jerome Lopez MD    Current Problem List:    Patient Active Problem List   Diagnosis    Sepsis ([facility]    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[facility]    Tobacco use disorder, continuous    Bipolar disorder ([facility]    Left hip pain    Lactic acidosis    Hypokalemia    Hypomagnesemia    Compression fracture of L4 lumbar vertebra    Thoracic compression fracture ([facility]    Ventral hernia    Parapneumonic effusion    Acute on chronic respiratory failure with hypoxia (HCC)    Chronic respiratory failure (HCC)    Hypophosphatemia    Elevated MCV    Schizoaffective disorder, bipolar type (HCC)    Acquired hypothyroidism    Gastroesophageal reflux disease without esophagitis    Abnormal CT of the chest    Excessive cerumen in left ear canal    Lipoma of right upper extremity    Polydipsia    Localized swelling of both lower legs    Noncompliant with deep vein thrombosis (DVT) prophylaxis    Allergic conjunctivitis of right eye    At risk for aspiration       Problem list reviewed 11/10/19     Objective:     Vital Signs:  Vitals:    11/09/19 2005 11/09/19 2130 11/10/19 0700 11/10/19 0705   BP: 99/64  103/60 (!) 87/65   BP Location: Left arm  Left arm Left arm   Pulse: 88  77 72   Resp: 18 19 19   Temp: (!) 96 9 °F (36 1 °C)  97 9 °F (36 6 °C)    TempSrc: Temporal  Temporal    SpO2: 96% 92% 91%    Weight:   66 7 kg (147 lb)    Height:             Appearance:  age appropriate, casually dressed and disheveled   Behavior:  normal   Speech:  normal volume   Mood:  anxious   Affect:  constricted   Thought Process:  normal   Thought Content:  delusions  persecutory   Perceptual Disturbances: None   Risk Potential: none   Sensorium:  person, place and time   Cognition:  intact   Consciousness:  alert and awake    Attention: attention span and concentration were age appropriate   Intellect: average   Insight:  poor   Judgment: poor      Motor Activity: no abnormal movements       I/O Past 24 hours:  No intake/output data recorded  No intake/output data recorded  Labs:  Reviewed 11/10/19    Progress Toward Goals:  unchanged    Assessment / Plan:     [facility]ffective disorder, [facility]    Recommended Treatment:      Medication changes:  1) continue current treatment plan    Non-pharmacological treatments  1) Continue with group therapy, milieu therapy and occupational therapy  Safety  1) Safety/communication plan established targeting dynamic risk factors above      2) Risks, benefits, and possible side effects of medications explained to patient and patient verbalizes understanding  Counseling / Coordination of Care    Total floor / unit time spent today 20 minutes  Greater than 50% of total time was spent with the patient and / or family counseling and / or coordination of care  A description of the counseling / coordination of care  Patient's Rights, confidentiality and exceptions to confidentiality, use of automated medical record, [facility] staff access to medical record, and consent to treatment reviewed      Irwin Yanez PA-C

## 2019-11-10 NOTE — PROGRESS NOTES
Sleeping at this time, no distress noted, safe and fall precautions in place, monitoring continues   O2 on at 1L via NC

## 2019-11-11 NOTE — PLAN OF CARE
Problem: Alteration in Thoughts and Perception  Goal: Verbalize thoughts and feelings  Description  Interventions:  - Promote a nonjudgmental and trusting relationship with the patient through active listening and therapeutic communication  - Assess patient's level of functioning, behavior and potential for risk  - Engage patient in 1 on 1 interactions for a minimum of 15 minutes each session  - Encourage patient to express fears, feelings, frustrations, and discuss symptoms    - [place] patient to reality, help patient recognize reality-based thinking   - Administer medications as ordered and assess for potential side effects  - Provide the patient education related to the signs and symptoms of the illness and desired effects of prescribed medications  Outcome: Progressing  Goal: Agree to be compliant with medication regime, as prescribed and report medication side effects  Description  Interventions:  - Offer appropriate PRN medication and supervise ingestion; conduct aims, as needed   Outcome: Progressing     Problem: Depression  Goal: Refrain from isolation  Description  Interventions:  - Develop a trusting relationship   - Encourage socialization   Outcome: Kanu  has been a bit more visible on unit tonight, sitting out in dining room or chair by phone when it is not in use  But, she continues worrisome about her physical wellbeing  Ate poorly @ meal; only juice, coffee, her Ensure  Maintained couldn't eat because of the tension produced in dining room by needy peer  Then, worrying about her blood sugar; saying is "dizzy & weak"  Accu check was 114 @ 1915 when she aired this concern & she was pleasantly surprised  Readily came to window for supper medicine

## 2019-11-11 NOTE — ASSESSMENT & PLAN NOTE
Psychiatry Progress Note    Patient did take a shower on the 9th but is again psychosomatic claiming that she has low blood sugar and that she cannot breathe etc as excuses for her not attending any groups  She is making unrealistic demands for juice he had for checking her blood sugar for no apparent reason  She does eat her meals but then goes back to bed and lays down wearing the same clothing and refusing to change at all  She has started taking the Zoloft and claims that she is still depressed but she is able talk with me when approached and was found sitting in a chair in the hallway  She continues to have psychosomatic symptoms about not being able to breathe or not able to swallow etc which have not changed and has multiple excuses for not taking showers or going to groups  She is still accuses is some off the staff of playing with her medications like being inhalers and for withholding information as she thinks she may have some terrible disease like hands  She continues to believe that that there is a micro chip on her right forearm and still accuses the therapist of stealing her lover who is supposedly a medical doctor here but no one here knows who that medical doctor is   No current suicidal homicidal thoughts intent or plans reported  No overt hallucinations or other delusions reported   No signs or sxs of agranulocytosis or myocarditis or endocarditis and she is moving her bowels so far with no issues   Patient was again reminded to attend to ADLs skills and take showers at least every other day and attend more groups to keep up with her 25% expectation so she can go back to the [place] personal care [facility] but she has been passive-aggressive and somewhat demanding on the unit with her unrealistic somatic complaints    Current medications:    Current Facility-Administered Medications:     acetaminophen (TYLENOL) tablet 650 mg, 650 mg, Oral, Q6H PRN, Sandhya Bolaños MD    albuterol (PROVENTIL HFA,VENTOLIN HFA) inhaler 2 puff, 2 puff, Inhalation, Q4H PRN, Sarah Hanna MD, 2 puff at 10/11/19 0424    aluminum-magnesium hydroxide-simethicone (MYLANTA) 200-200-20 mg/5 mL oral suspension 15 mL, 15 mL, Oral, Q4H PRN, Sarah Hanna MD    ammonium lactate (LAC-HYDRIN) 12 % lotion 1 application, 1 application, Topical, BID PRN, Sarah Hanna MD    benzonatate (TESSALON PERLES) capsule 100 mg, 100 mg, Oral, TID PRN, Sarah Hanna MD    benztropine (COGENTIN) injection 1 mg, 1 mg, Intramuscular, Q8H PRN, Sarah Hanna MD    cloZAPine (CLOZARIL) tablet 25 mg, 25 mg, Oral, BID, Sarah Hanna MD, 25 mg at 11/10/19 2137    cloZAPine (CLOZARIL) tablet 50 mg, 50 mg, Oral, BID, Sarah Hanna MD, 50 mg at 11/10/19 2137    EPINEPHrine PF (ADRENALIN) 1 mg/mL injection 0 15 mg, 0 15 mg, Intramuscular, Once PRN, Sarah Hanna MD    fluticasone-vilanterol (BREO ELLIPTA) 200-25 MCG/INH inhaler 1 puff, 1 puff, Inhalation, Daily, Sarah Hanna MD, 1 puff at 11/11/19 0840    ketotifen (ZADITOR) 0 025 % ophthalmic solution 1 drop, 1 drop, Right Eye, BID PRN, Sarah Hanna MD    levothyroxine tablet 125 mcg, 125 mcg, Oral, Early Morning, Sarah Hanna MD, 125 mcg at 11/11/19 0557    magnesium hydroxide (MILK OF MAGNESIA) 400 mg/5 mL oral suspension 30 mL, 30 mL, Oral, Daily PRN, Sarah Hanna MD    montelukast (SINGULAIR) tablet 10 mg, 10 mg, Oral, HS, Sarah Hanna MD, 10 mg at 11/07/19 2121    OLANZapine (ZyPREXA) IM injection 5 mg, 5 mg, Intramuscular, Q8H PRN, Sarah Hanna MD    OLANZapine (ZyPREXA) tablet 5 mg, 5 mg, Oral, Q8H PRN, Sarah Hanna MD    ondansetron (ZOFRAN-ODT) dispersible tablet 4 mg, 4 mg, Oral, Q6H PRN, Sarah Hanna MD, 4 mg at 11/09/19 1223    pantoprazole (PROTONIX) EC tablet 40 mg, 40 mg, Oral, Early Morning, Sarah Hanna MD, 40 mg at 11/11/19 0557    polyethylene glycol (MIRALAX) packet 17 g, 17 g, Oral, Daily PRN, Sarah Hanna MD    polyvinyl alcohol (LIQUIFILM TEARS) 1 4 % ophthalmic solution 1 drop, 1 drop, Both Eyes, Q3H PRN, Jeffrey Gallegos MD    sertraline (ZOLOFT) tablet 50 mg, 50 mg, Oral, Daily, Jeffrey Gallegos MD, 50 mg at 11/11/19 0841    theophylline (JEF-24) 24 hr capsule 200 mg, 200 mg, Oral, Daily, Jeffrey Gallegos MD, 200 mg at 11/11/19 0841    tiotropium (SPIRIVA) capsule for inhaler 18 mcg, 18 mcg, Inhalation, Daily, Jeffrey Gallegos MD, 18 mcg at 11/11/19 0840    traZODone (DESYREL) tablet 25 mg, 25 mg, Oral, HS PRN, Jeffrey Gallegos MD  Justification if on more than two antipsychotics:  Only on his clozapine  Side effects if any:  None    Risks , benefits, side effects and precautions of medications discussed with patient and reminded patient to let us know any problems with medications     Objective:     Vital Signs:  Vitals:    11/10/19 0700 11/10/19 0705 11/10/19 2000 11/11/19 0700   BP: 103/60 (!) 87/65 100/60 110/57   BP Location: Left arm Left arm Right arm Right arm   Pulse: 77 72 104 79   Resp: 19 19 14 18   Temp: 97 9 °F (36 6 °C)  (!) 97 °F (36 1 °C) 97 9 °F (36 6 °C)   TempSrc: Temporal  Temporal    SpO2: 91%  95%    Weight: 66 7 kg (147 lb)      Height:         Appearance:  age appropriate, casually dressed and overweight older than stated age, uncombed long grey hair, poorly groomed disheveled unkempt   Behavior:  evasive and guarded and suspicious but with good eye contact and preoccupied psychosomatic   Speech:  normal pitch and normal volume but circumstantial and tangential   Mood:  anxious and dysthymic   Affect:  mood-congruent, elated and entitled at times   Thought Process:  goal directed and illogical slightly pressured and tangential and talks as if in a court of law being stilted off and on   Thought Content:  delusions  grandiose and somatic type about not being able to breathe despite being on nasal oxygen and paranoid about people out to harm her and Atrovent inhaler tainted with peanut oil  No current suicidal homicidal thoughts intent or plans reported    No phobias obsessions or compulsions reported  Somatic delusions about having cancer or terrible illnesses like cancer or having a micro chip on her hand and the therapist stealing her lover from her whom she identifies as a doctor  She believes that people are withholding information from her about her having cancer which is also a somatic delusion   Perceptual Disturbances: None and does not appear responding to internal stimuli   Risk Potential: Tendency to not care for herself if she goes off treatment   Sensorium:  person, place, time/date, situation, day of week, month of year and time   Cognition:  grossly intact   Consciousness:  alert and awake    Attention: Intact concentration and attention span   Intellect: Considered to be at least of average intelligence   Insight:  limited and in denial   Judgment: poor      Motor Activity: no abnormal movements         Recent Labs:  Results Reviewed     None          I/O Past 24 hours:  No intake/output data recorded  No intake/output data recorded          Assessment / Plan:     Schizoaffective disorder, bipolar type ([facility]      Reason for continued inpatient care:  Because of underlying paranoia and refusal to go back to the personal care home and inability to care for herself on her own  Acceptance by patient:  Accepting  Robert Yadav in recovery:  About living in another personal care home once she feels better  Understanding of medications :  Has some understanding  Involved in reintegration process:  Adjusting to the unit  Trusting in relatoinship with psychiatrist:  Trusting    Recommended Treatment:    Medication changes:  1) no changes today since Zoloft was added and Prozac being discontinued  Non-pharmacological treatments  1) continue with  individual therapy group therapy, milieu therapy and occupational therapy and milieu therapy involving multidisciplinary team approach with psychotherapist, case management, nursing, peer support services, art therapist etc using recovery principles and psycho-education about accepting illness and need for treatment   3) encourage to attend to ADLs like taking showers and wearing clean clothes   4) Encourage to attend groups   Safety  1) Safety/communication plan established targeting dynamic risk factors above  Discharge Plan most likely back to the a:  Personal care boarding home with act services once her delusions are managed and mood becomes more stabilized and she becomes more receptive to treatment compliance    Counseling / Coordination of Care    Total floor / unit time spent today 15 minutes  Greater than 50% of total time was spent with the patient and / or family counseling and / or coordination of care  A description of the counseling / coordination of care  Patient's Rights, confidentiality and exceptions to confidentiality, use of automated medical record, [facility] access to medical record, and consent to treatment reviewed      Roberto Colorado MD

## 2019-11-11 NOTE — PROGRESS NOTES
11/11/19 0900   Team Meeting   Meeting Type Daily Rounds   Team Members Present   Team Members Present Physician;Nurse;; Other (Discipline and Name)   Physician Team Member Dr Francis Franco Team Member Lu Young, RN   Care Management Team Member Brenda Hemphill   Other (Discipline and Name) Anthony Colindres LCSW     Patient showered Saturday but went one week without showering prior to this past shower  Somatic  Preoccupied  PM group on Sunday  Visible 3-11 shift  Slept

## 2019-11-11 NOTE — PLAN OF CARE
Problem: Alteration in Thoughts and Perception  Goal: Attend and participate in unit activities, including therapeutic, recreational, and educational groups  Description  Interventions:  - Provide therapeutic and educational activities daily, encourage attendance and participation, and document same in the medical record     CERTIFIED PEER SPECIALIST INTERVENTIONS:    Complete peer assessment with patient to assess their needs and identify their goals to complete while in the recovery program as well as once discharged into the community  Patient will complete WRAP Plan, Crisis Plan and [facility]  Patient will attend 50% of groups offered on the unit  Patient will complete a goal card weekly  Outcome: Not Progressing  Patient declines working on [facility] Relapse Prevention and continues to have a below average group percentage and little/no interest in her Mental Health recovery  Group percentage at 7%  Patient is fixated on physical issues and has little insight into her Mental Health needs  Writer continues to encourage Pt to attend groups without success

## 2019-11-11 NOTE — PROGRESS NOTES
Sleeping at this time, O2 on 1L via NC  No s/s of resp distress noted and no issues noted  All precautions in place and maintained at this time   Monitoring continues

## 2019-11-11 NOTE — PLAN OF CARE
Problem: Alteration in Thoughts and Perception  Goal: Verbalize thoughts and feelings  Description  Interventions:  - Promote a nonjudgmental and trusting relationship with the patient through active listening and therapeutic communication  - Assess patient's level of functioning, behavior and potential for risk  - Engage patient in 1 on 1 interactions for a minimum of 15 minutes each session  - Encourage patient to express fears, feelings, frustrations, and discuss symptoms    - [place] patient to reality, help patient recognize reality-based thinking   - Administer medications as ordered and assess for potential side effects  - Provide the patient education related to the signs and symptoms of the illness and desired effects of prescribed medications  Outcome: Progressing  Goal: Agree to be compliant with medication regime, as prescribed and report medication side effects  Description  Interventions:  - Offer appropriate PRN medication and supervise ingestion; conduct aims, as needed   Outcome: Progressing     Problem: Ineffective Coping  Goal: Patient/Family verbalizes awareness of resources  Outcome: Progressing  Goal: Understands least restrictive measures  Description  Interventions:  - Utilize least restrictive behavior  Outcome: Progressing     Problem: Risk for Self Injury/Neglect  Goal: Treatment Goal: Remain safe during length of stay, learn and adopt new coping skills, and be free of self-injurious ideation, impulses and acts at the time of discharge  Outcome: Progressing  Goal: Verbalize thoughts and feelings  Description  Interventions:  - Assess and re-assess patient's lethality and potential for self-injury  - Engage patient in 1:1 interactions, daily, for a minimum of 15 minutes  - Encourage patient to express feelings, fears, frustrations, hopes  - Establish rapport/trust with patient   Outcome: Progressing  Goal: Refrain from harming self  Description  Interventions:  - Monitor patient closely, per order  - Develop a trusting relationship  - Supervise medication ingestion, monitor effects and side effects   Outcome: Progressing     Problem: Depression  Goal: Verbalize thoughts and feelings  Description  Interventions:  - Assess and re-assess patient's level of risk   - Engage patient in 1:1 interactions, daily, for a minimum of 15 minutes   - Encourage patient to express feelings, fears, frustrations, hopes   Outcome: Progressing     Problem: Alteration in Orientation  Goal: Interact with staff daily  Description  Interventions:  - Assess and re-assess patient's level of orientation  - Engage patient in 1 on 1 interactions, daily, for a minimum of 15 minutes   - Establish rapport/trust with patient   Outcome: Progressing     Problem: PAIN - ADULT  Goal: Verbalizes/displays adequate comfort level or baseline comfort level  Description  Interventions:  - Encourage patient to monitor pain and request assistance  - Assess pain using appropriate pain scale  - Administer analgesics based on type and severity of pain and evaluate response  - Implement non-pharmacological measures as appropriate and evaluate response  - Consider cultural and social influences on pain and pain management  - Notify physician/advanced practitioner if interventions unsuccessful or patient reports new pain  Outcome: Progressing     Problem: SAFETY ADULT  Goal: Patient will remain free of falls  Description  INTERVENTIONS:  - Assess patient frequently for physical needs  -  Identify cognitive and physical deficits and behaviors that affect risk of falls    -  [place] fall precautions as indicated by assessment   - Educate patient/family on patient safety including physical limitations  - Instruct patient to call for assistance with activity based on assessment  - Modify environment to reduce risk of injury  - Consider OT/PT consult to assist with strengthening/mobility  Outcome: Progressing     Problem: Alteration in Thoughts and Perception  Goal: Treatment Goal: Gain control of psychotic behaviors/thinking, reduce/eliminate presenting symptoms and demonstrate improved reality functioning upon discharge  Outcome: Not Progressing  Goal: Attend and participate in unit activities, including therapeutic, recreational, and educational groups  Description  Interventions:  - Provide therapeutic and educational activities daily, encourage attendance and participation, and document same in the medical record     CERTIFIED PEER SPECIALIST INTERVENTIONS:    Complete peer assessment with patient to assess their needs and identify their goals to complete while in the recovery program as well as once discharged into the community  Patient will complete WRAP Plan, Crisis Plan and 5 Life Domains  Patient will attend 50% of groups offered on the unit  Patient will complete a goal card weekly      Outcome: Not Progressing  Goal: Recognize dysfunctional thoughts, communicate reality-based thoughts at the time of discharge  Description  Interventions:  - Provide medication and psycho-education to assist patient in compliance and developing insight into his/her illness   Outcome: Not Progressing  Goal: Complete daily ADLs, including personal hygiene independently, as able  Description  Interventions:  - Observe, teach, and assist patient with ADLS  - Monitor and promote a balance of rest/activity, with adequate nutrition and elimination   Outcome: Not Progressing     Problem: Ineffective Coping  Goal: Identifies ineffective coping skills  Outcome: Not Progressing  Goal: Identifies healthy coping skills  Outcome: Not Progressing  Goal: Demonstrates healthy coping skills  Outcome: Not Progressing  Goal: Participates in unit activities  Description  Interventions:  - Provide therapeutic environment   - Provide required programming   - Redirect inappropriate behaviors   Outcome: Not Progressing     Problem: Risk for Self Injury/Neglect  Goal: Attend and participate in unit activities, including therapeutic, recreational, and educational groups  Description  Interventions:  - Provide therapeutic and educational activities daily, encourage attendance and participation, and document same in the medical record  - Obtain collateral information, encourage visitation and family involvement in care   Outcome: Not Progressing  Goal: Recognize maladaptive responses and adopt new coping mechanisms  Outcome: Not Progressing  Goal: Complete daily ADLs, including personal hygiene independently, as able  Description  Interventions:  - Observe, teach, and assist patient with ADLS  - Monitor and promote a balance of rest/activity, with adequate nutrition and elimination  Outcome: Not Progressing     Problem: Depression  Goal: Treatment Goal: Demonstrate behavioral control of depressive symptoms, verbalize feelings of improved mood/affect, and adopt new coping skills prior to discharge  Outcome: Not Progressing  Goal: Refrain from isolation  Description  Interventions:  - Develop a trusting relationship   - Encourage socialization   Outcome: Not Progressing  Goal: Refrain from self-neglect  Outcome: Not Progressing     Problem: Anxiety  Goal: Anxiety is at manageable level  Description  Interventions:  - Assess and monitor patient's anxiety level  - Monitor for signs and symptoms of anxiety both physical and emotional (heart palpitations, chest pain, shortness of breath, headaches, nausea, feeling jumpy, restlessness, irritable, apprehensive)  - Collaborate with interdisciplinary team and initiate plan and interventions as ordered    - [place] patient to unit/surroundings  - Explain treatment plan  - Encourage participation in care  - Encourage verbalization of concerns/fears  - Identify coping mechanisms  - Assist in developing anxiety-reducing skills  - Administer/offer alternative therapies  - Limit or eliminate stimulants  Outcome: Not Progressing     Problem: RESPIRATORY - ADULT  Goal: Achieves optimal ventilation and oxygenation  Description  INTERVENTIONS:  - Assess for changes in respiratory status  - Assess for changes in mentation and behavior  - Position to facilitate oxygenation and minimize respiratory effort  - Oxygen administration by appropriate delivery method based on oxygen saturation (per order) or ABGs  - Initiate smoking cessation education as indicated  - Encourage broncho-pulmonary hygiene including cough, deep breathe, Incentive Spirometry  - Assess the need for suctioning and aspirate as needed  - Assess and instruct to report SOB or any respiratory difficulty  - Respiratory Therapy support as indicated  Outcome: Not Progressing     Problem: Nutrition/Hydration-ADULT  Goal: Nutrient/Hydration intake appropriate for improving, restoring or maintaining nutritional needs  Description  Monitor and assess patient's nutrition/hydration status for malnutrition  Collaborate with interdisciplinary team and initiate plan and interventions as ordered  Monitor patient's weight and dietary intake as ordered or per policy  Utilize nutrition screening tool and intervene as necessary  Determine patient's food preferences and provide high-protein, high-caloric foods as appropriate       INTERVENTIONS:  - Monitor oral intake, urinary output, labs, and treatment plans  - Assess nutrition and hydration status and recommend course of action  - Evaluate amount of meals eaten  - Assist patient with eating if necessary   - Allow adequate time for meals  - Recommend/ encourage appropriate diets, oral nutritional supplements, and vitamin/mineral supplements  - Order, calculate, and assess calorie counts as needed  - Recommend, monitor, and adjust tube feedings and TPN/PPN based on assessed needs  - Assess need for intravenous fluids  - Provide specific nutrition/hydration education as appropriate  - Include patient/family/caregiver in decisions related to nutrition  Outcome: Not Progressing   The patient has been very somatically preoccupied and attention seeking, going to multiple staff members asking for juice which will make her "feel better"  [place] dramatic, saying things like "I haven't eaten in months, I could die!"  Very insistent that she needs immediate medical attention with which no amount of reassurance or redirection has been successful  Her blood sugar was 78 at 0947, and it was explained to the patient by multiple staff members that a blood sugar level of 78 falls within the normal range of   Patient was unwilling to accept this, insisting that she's symptomatic for hypoglycemia  When it's pointed out to her that she continues to seek sugary foods and that these types of foods will result in instability in her blood sugars, and that she should make healthier choices, ie protein which will sustain her blood sugars, the patient completely rejects these concepts saying that the staff have no idea what they are talking about  Also encouraged to drink more water to which she also rejects the notion of  When incongruence of behaviors / presentation vs somatic complaints is presented to her, she immediately rejects and provides many excuses  She has not been compliant with her behavior plan, has not showered, and did not attend any groups  Refused to use incentive spirometer  She is currently laying in bed  Continue to monitor

## 2019-11-11 NOTE — PROGRESS NOTES
2145 Gregoria Leger attended PM Group tonight  She had an HS snack of 2 yogurts, came up on own for HS medicine  Wearing now her QHS humidified nasal O2 @ 1L for bed

## 2019-11-11 NOTE — PROGRESS NOTES
Progress Note - Roselia Woody 1955, 60 y o  female MRN: [id number]    Unit/Bed#: [facility] 110-02 Encounter: [id number]    Primary Care Provider: Deeanna Apley, PA-C   Date and time admitted to hospital: 7/23/2019  5:30 PM        * Schizoaffective disorder, bipolar type [facility])  Assessment & Plan  Psychiatry Progress Note    Patient did take a shower on the 9th but is again psychosomatic claiming that she has low blood sugar and that she cannot breathe etc as excuses for her not attending any groups  She is making unrealistic demands for juice he had for checking her blood sugar for no apparent reason  She does eat her meals but then goes back to bed and lays down wearing the same clothing and refusing to change at all  She has started taking the Zoloft and claims that she is still depressed but she is able talk with me when approached and was found sitting in a chair in the hallway  She continues to have psychosomatic symptoms about not being able to breathe or not able to swallow etc which have not changed and has multiple excuses for not taking showers or going to groups  She is still accuses is some off the staff of playing with her medications like being inhalers and for withholding information as she thinks she may have some terrible disease like hands  She continues to believe that that there is a micro chip on her right forearm and still accuses the therapist of stealing her lover who is supposedly a medical doctor here but no one here knows who that medical doctor is   No current suicidal homicidal thoughts intent or plans reported  No overt hallucinations or other delusions reported   No signs or sxs of agranulocytosis or myocarditis or endocarditis and she is moving her bowels so far with no issues     Patient was again reminded to attend to ADLs skills and take showers at least every other day and attend more groups to keep up with her 25% expectation so she can go back to the [facility] but she has been passive-aggressive and somewhat demanding on the unit with her unrealistic somatic complaints    Current medications:    Current Facility-Administered Medications:     acetaminophen (TYLENOL) tablet 650 mg, 650 mg, Oral, Q6H PRN, Vincent Olivares MD    albuterol (PROVENTIL HFA,VENTOLIN HFA) inhaler 2 puff, 2 puff, Inhalation, Q4H PRN, Vincent Olivares MD, 2 puff at 10/11/19 0424    aluminum-magnesium hydroxide-simethicone (MYLANTA) 200-200-20 mg/5 mL oral suspension 15 mL, 15 mL, Oral, Q4H PRN, Vincent Olivares MD    ammonium lactate (LAC-HYDRIN) 12 % lotion 1 application, 1 application, Topical, BID PRN, Vincent Olivares MD    benzonatate (TESSALON PERLES) capsule 100 mg, 100 mg, Oral, TID PRN, Vincent Olivares MD    benztropine (COGENTIN) injection 1 mg, 1 mg, Intramuscular, Q8H PRN, Vincent Olivares MD    cloZAPine (CLOZARIL) tablet 25 mg, 25 mg, Oral, BID, Vincent Olivares MD, 25 mg at 11/10/19 2137    cloZAPine (CLOZARIL) tablet 50 mg, 50 mg, Oral, BID, Vincent Olivares MD, 50 mg at 11/10/19 2137    EPINEPHrine PF (ADRENALIN) 1 mg/mL injection 0 15 mg, 0 15 mg, Intramuscular, Once PRN, Vincent Olivares MD    fluticasone-vilanterol (BREO ELLIPTA) 200-25 MCG/INH inhaler 1 puff, 1 puff, Inhalation, Daily, Vincent Olivares MD, 1 puff at 11/11/19 0840    ketotifen (ZADITOR) 0 025 % ophthalmic solution 1 drop, 1 drop, Right Eye, BID PRN, Vincent Olivares MD    levothyroxine tablet 125 mcg, 125 mcg, Oral, Early Morning, Vincent Olivares MD, 125 mcg at 11/11/19 0557    magnesium hydroxide (MILK OF MAGNESIA) 400 mg/5 mL oral suspension 30 mL, 30 mL, Oral, Daily PRN, Vincent Olivares MD    montelukast (SINGULAIR) tablet 10 mg, 10 mg, Oral, HS, Vincent Olivares MD, 10 mg at 11/07/19 2121    OLANZapine (ZyPREXA) IM injection 5 mg, 5 mg, Intramuscular, Q8H PRN, Vincnet Olivares MD    OLANZapine (ZyPREXA) tablet 5 mg, 5 mg, Oral, Q8H PRN, Vincent Olivares MD    ondansetron (ZOFRAN-ODT) dispersible tablet 4 mg, 4 mg, Oral, Q6H PRN, Christian Bennett MD, 4 mg at 11/09/19 1223    pantoprazole (PROTONIX) EC tablet 40 mg, 40 mg, Oral, Early Morning, Christian Bennett MD, 40 mg at 11/11/19 0557    polyethylene glycol (MIRALAX) packet 17 g, 17 g, Oral, Daily PRN, Christian Bennett MD    polyvinyl alcohol (LIQUIFILM TEARS) 1 4 % ophthalmic solution 1 drop, 1 drop, Both Eyes, Q3H PRN, Christian Bennett MD    sertraline (ZOLOFT) tablet 50 mg, 50 mg, Oral, Daily, Christian Bennett MD, 50 mg at 11/11/19 0841    theophylline (JEF-24) 24 hr capsule 200 mg, 200 mg, Oral, Daily, Christian Bennett MD, 200 mg at 11/11/19 0841    tiotropium (SPIRIVA) capsule for inhaler 18 mcg, 18 mcg, Inhalation, Daily, Christian Bennett MD, 18 mcg at 11/11/19 0840    traZODone (DESYREL) tablet 25 mg, 25 mg, Oral, HS PRN, Christian Bennett MD  Justification if on more than two antipsychotics:  Only on his clozapine  Side effects if any:  None    Risks , benefits, side effects and precautions of medications discussed with patient and reminded patient to let us know any problems with medications     Objective:     Vital Signs:  Vitals:    11/10/19 0700 11/10/19 0705 11/10/19 2000 11/11/19 0700   BP: 103/60 (!) 87/65 100/60 110/57   BP Location: Left arm Left arm Right arm Right arm   Pulse: 77 72 104 79   Resp: 19 19 14 18   Temp: 97 9 °F (36 6 °C)  (!) 97 °F (36 1 °C) 97 9 °F (36 6 °C)   TempSrc: Temporal  Temporal    SpO2: 91%  95%    Weight: 66 7 kg (147 lb)      Height:         Appearance:  age appropriate, casually dressed and overweight older than stated age, uncombed long grey hair, poorly groomed disheveled unkempt   Behavior:  evasive and guarded and suspicious but with good eye contact and preoccupied psychosomatic   Speech:  normal pitch and normal volume but circumstantial and tangential   Mood:  anxious and dysthymic   Affect:  mood-congruent, elated and entitled at times   Thought Process:  goal directed and illogical slightly pressured and tangential and talks as if in a court of law being stilted off and on   Thought Content:  delusions  grandiose and somatic type about not being able to breathe despite being on nasal oxygen and paranoid about people out to harm her and Atrovent inhaler tainted with peanut oil  No current suicidal homicidal thoughts intent or plans reported  No phobias obsessions or compulsions reported  Somatic delusions about having cancer or terrible illnesses like cancer or having a micro chip on her hand and the therapist stealing her lover from her whom she identifies as a doctor  She believes that people are withholding information from her about her having cancer which is also a somatic delusion   Perceptual Disturbances: None and does not appear responding to internal stimuli   Risk Potential: Tendency to not care for herself if she goes off treatment   Sensorium:  person, place, time/date, situation, day of week, month of year and time   Cognition:  grossly intact   Consciousness:  alert and awake    Attention: Intact concentration and attention span   Intellect: Considered to be at least of average intelligence   Insight:  limited and in denial   Judgment: poor      Motor Activity: no abnormal movements         Recent Labs:  Results Reviewed     None          I/O Past 24 hours:  No intake/output data recorded  No intake/output data recorded          Assessment / Plan:     [facility]ffect[facility]      Reason for continued inpatient care:  Because of underlying paranoia and refusal to go back to the personal care home and inability to care for herself on her own  Acceptance by patient:  Accepting  Clarisa Mcgregor in recovery:  About living in another personal care home once she feels better  Understanding of medications :  Has some understanding  Involved in reintegration process:  Adjusting to the unit  Trusting in relatoinship with psychiatrist:  Trusting    Recommended Treatment:    Medication changes:  1) no changes today since Zoloft was added and Prozac being discontinued  Non-pharmacological treatments  1) continue with  individual therapy group therapy, milieu therapy and occupational therapy and milieu therapy involving multidisciplinary team approach with psychotherapist, case management, nursing, peer support services, art therapist etc using recovery principles and psycho-education about accepting illness and need for treatment   3) encourage to attend to ADLs like taking showers and wearing clean clothes   4) Encourage to attend groups   Safety  1) Safety/communication plan established targeting dynamic risk factors above  Discharge Plan most likely back to the a:  Personal care boarding home with [facility] once her delusions are managed and mood becomes more stabilized and she becomes more receptive to treatment compliance    Counseling / Coordination of Care    Total floor / unit time spent today 15 minutes  Greater than 50% of total time was spent with the patient and / or family counseling and / or coordination of care  A description of the counseling / coordination of care  Patient's Rights, confidentiality and exceptions to confidentiality, use of automated medical record, [facility] staff access to medical record, and consent to treatment reviewed      Tree Madden MD

## 2019-11-11 NOTE — PROGRESS NOTES
The patient remained in her room for the rest of the shift with the exception of coming out for lunch  She refused to eat her lunch but did drink the ensure supplement that came with her meal  Continues to voice somatic complaints and stated "I think I'm dying" yet refuses to follow doctor or nurse recommendations on how to improve health and wellbeing  Continue to monitor

## 2019-11-12 NOTE — PLAN OF CARE
Problem: Alteration in Thoughts and Perception  Goal: Verbalize thoughts and feelings  Description  Interventions:  - Promote a nonjudgmental and trusting relationship with the patient through active listening and therapeutic communication  - Assess patient's level of functioning, behavior and potential for risk  - Engage patient in 1 on 1 interactions for a minimum of 15 minutes each session  - Encourage patient to express fears, feelings, frustrations, and discuss symptoms    - [place] patient to reality, help patient recognize reality-based thinking   - Administer medications as ordered and assess for potential side effects  - Provide the patient education related to the signs and symptoms of the illness and desired effects of prescribed medications  Outcome: Progressing  Goal: Agree to be compliant with medication regime, as prescribed and report medication side effects  Description  Interventions:  - Offer appropriate PRN medication and supervise ingestion; conduct aims, as needed   Outcome: Progressing     Problem: Ineffective Coping  Goal: Patient/Family participate in treatment and DC plans  Description  Interventions:  - Provide therapeutic environment  Outcome: Progressing  Goal: Patient/Family verbalizes awareness of resources  Outcome: Progressing  Goal: Understands least restrictive measures  Description  Interventions:  - Utilize least restrictive behavior  Outcome: Progressing     Problem: Risk for Self Injury/Neglect  Goal: Treatment Goal: Remain safe during length of stay, learn and adopt new coping skills, and be free of self-injurious ideation, impulses and acts at the time of discharge  Outcome: Progressing  Goal: Verbalize thoughts and feelings  Description  Interventions:  - Assess and re-assess patient's lethality and potential for self-injury  - Engage patient in 1:1 interactions, daily, for a minimum of 15 minutes  - Encourage patient to express feelings, fears, frustrations, hopes  - Establish rapport/trust with patient   Outcome: Progressing  Goal: Refrain from harming self  Description  Interventions:  - Monitor patient closely, per order  - Develop a trusting relationship  - Supervise medication ingestion, monitor effects and side effects   Outcome: Progressing     Problem: Depression  Goal: Verbalize thoughts and feelings  Description  Interventions:  - Assess and re-assess patient's level of risk   - Engage patient in 1:1 interactions, daily, for a minimum of 15 minutes   - Encourage patient to express feelings, fears, frustrations, hopes   Outcome: Progressing     Problem: Anxiety  Goal: Anxiety is at manageable level  Description  Interventions:  - Assess and monitor patient's anxiety level  - Monitor for signs and symptoms of anxiety both physical and emotional (heart palpitations, chest pain, shortness of breath, headaches, nausea, feeling jumpy, restlessness, irritable, apprehensive)  - Collaborate with interdisciplinary team and initiate plan and interventions as ordered    - [place] patient to unit/surroundings  - Explain treatment plan  - Encourage participation in care  - Encourage verbalization of concerns/fears  - Identify coping mechanisms  - Assist in developing anxiety-reducing skills  - Administer/offer alternative therapies  - Limit or eliminate stimulants  Outcome: Progressing     Problem: Alteration in Orientation  Goal: Interact with staff daily  Description  Interventions:  - Assess and re-assess patient's level of orientation  - Engage patient in 1 on 1 interactions, daily, for a minimum of 15 minutes   - Establish rapport/trust with patient   Outcome: Progressing     Problem: PAIN - ADULT  Goal: Verbalizes/displays adequate comfort level or baseline comfort level  Description  Interventions:  - Encourage patient to monitor pain and request assistance  - Assess pain using appropriate pain scale  - Administer analgesics based on type and severity of pain and evaluate response  - Implement non-pharmacological measures as appropriate and evaluate response  - Consider cultural and social influences on pain and pain management  - Notify physician/advanced practitioner if interventions unsuccessful or patient reports new pain  Outcome: Progressing     Problem: SAFETY ADULT  Goal: Patient will remain free of falls  Description  INTERVENTIONS:  - Assess patient frequently for physical needs  -  Identify cognitive and physical deficits and behaviors that affect risk of falls    -  [place] fall precautions as indicated by assessment   - Educate patient/family on patient safety including physical limitations  - Instruct patient to call for assistance with activity based on assessment  - Modify environment to reduce risk of injury  - Consider OT/PT consult to assist with strengthening/mobility  Outcome: Progressing     Problem: RESPIRATORY - ADULT  Goal: Achieves optimal ventilation and oxygenation  Description  INTERVENTIONS:  - Assess for changes in respiratory status  - Assess for changes in mentation and behavior  - Position to facilitate oxygenation and minimize respiratory effort  - Oxygen administration by appropriate delivery method based on oxygen saturation (per order) or ABGs  - Initiate smoking cessation education as indicated  - Encourage broncho-pulmonary hygiene including cough, deep breathe, Incentive Spirometry  - Assess the need for suctioning and aspirate as needed  - Assess and instruct to report SOB or any respiratory difficulty  - Respiratory Therapy support as indicated  Outcome: Progressing     Problem: Alteration in Thoughts and Perception  Goal: Treatment Goal: Gain control of psychotic behaviors/thinking, reduce/eliminate presenting symptoms and demonstrate improved reality functioning upon discharge  Outcome: Not Progressing  Goal: Attend and participate in unit activities, including therapeutic, recreational, and educational groups  Description  Interventions:  - Provide therapeutic and educational activities daily, encourage attendance and participation, and document same in the medical record     CERTIFIED PEER SPECIALIST INTERVENTIONS:    Complete peer assessment with patient to assess their needs and identify their goals to complete while in the recovery program as well as once discharged into the community  Patient will complete WRAP Plan, Crisis Plan and [facility] [employer]  Patient will attend 50% of groups offered on the unit  Patient will complete a goal card weekly      Outcome: Not Progressing  Goal: Recognize dysfunctional thoughts, communicate reality-based thoughts at the time of discharge  Description  Interventions:  - Provide medication and psycho-education to assist patient in compliance and developing insight into his/her illness   Outcome: Not Progressing  Goal: Complete daily ADLs, including personal hygiene independently, as able  Description  Interventions:  - Observe, teach, and assist patient with ADLS  - Monitor and promote a balance of rest/activity, with adequate nutrition and elimination   Outcome: Not Progressing     Problem: Ineffective Coping  Goal: Identifies ineffective coping skills  Outcome: Not Progressing  Goal: Identifies healthy coping skills  Outcome: Not Progressing  Goal: Demonstrates healthy coping skills  Outcome: Not Progressing  Goal: Participates in unit activities  Description  Interventions:  - Provide therapeutic environment   - Provide required programming   - Redirect inappropriate behaviors   Outcome: Not Progressing     Problem: Risk for Self Injury/Neglect  Goal: Attend and participate in unit activities, including therapeutic, recreational, and educational groups  Description  Interventions:  - Provide therapeutic and educational activities daily, encourage attendance and participation, and document same in the medical record  - Obtain collateral information, encourage visitation and family involvement in care   Outcome: Not Progressing  Goal: Recognize maladaptive responses and adopt new coping mechanisms  Outcome: Not Progressing  Goal: Complete daily ADLs, including personal hygiene independently, as able  Description  Interventions:  - Observe, teach, and assist patient with ADLS  - Monitor and promote a balance of rest/activity, with adequate nutrition and elimination  Outcome: Not Progressing     Problem: Depression  Goal: Treatment Goal: Demonstrate behavioral control of depressive symptoms, verbalize feelings of improved mood/affect, and adopt new coping skills prior to discharge  Outcome: Not Progressing  Goal: Refrain from isolation  Description  Interventions:  - Develop a trusting relationship   - Encourage socialization   Outcome: Not Progressing  Goal: Refrain from self-neglect  Outcome: Not Progressing     Problem: Nutrition/Hydration-ADULT  Goal: Nutrient/Hydration intake appropriate for improving, restoring or maintaining nutritional needs  Description  Monitor and assess patient's nutrition/hydration status for malnutrition  Collaborate with interdisciplinary team and initiate plan and interventions as ordered  Monitor patient's weight and dietary intake as ordered or per policy  Utilize nutrition screening tool and intervene as necessary  Determine patient's food preferences and provide high-protein, high-caloric foods as appropriate       INTERVENTIONS:  - Monitor oral intake, urinary output, labs, and treatment plans  - Assess nutrition and hydration status and recommend course of action  - Evaluate amount of meals eaten  - Assist patient with eating if necessary   - Allow adequate time for meals  - Recommend/ encourage appropriate diets, oral nutritional supplements, and vitamin/mineral supplements  - Order, calculate, and assess calorie counts as needed  - Recommend, monitor, and adjust tube feedings and TPN/PPN based on assessed needs  - Assess need for intravenous fluids  - Provide specific nutrition/hydration education as appropriate  - Include patient/family/caregiver in decisions related to nutrition  Outcome: Not Progressing   The patient has been mostly isolative to her room, laying in her bed, and did not attend any groups  She had treatment team meeting and was not happy when they set limits with her in regards to her somatically focused behaviors  She continues to not follow staff or doctor recommendations  Refused to shower  Continues to refuse to use the incentive spirometer  She did get out of bed for med and meals but ate poorly, only eating 25% of breakfast and an ensure for lunch  Spoke with Dr Maria Eugenia Quezada from [address] in regards to her somatic behaviors and the need for her current diet and oxygen therapy which she has not been compliant with  He said he'll be in tomorrow to see her  Continue to monitor

## 2019-11-12 NOTE — PROGRESS NOTES
Not required to attend      11/12/19 5150   Activity/Group Checklist   Group   (Community Programming WRAP up )   Attendance Did not attend   Attendance Duration (min) 16-30

## 2019-11-12 NOTE — PROGRESS NOTES
[address] Jay came out of room for HS snack of two yogurts  Came to window for HS medicine, declined the Singulair  "I feel better off of it " Used the Incentive Spirometer achieving volumes of 1100-1250ml  Wearing her QHS humidified nasal O2 @ 1L now for bed

## 2019-11-12 NOTE — ASSESSMENT & PLAN NOTE
Psychiatry Progress Note  Patient is having  psychosomatic symptoms claiming that she has low blood sugar and demanding that she cannot breathe etc as excuses for her not attending any groups  She is demanding extra juice at times  making unrealistic demands for juice at odd times demanding to check her blood sugar for no apparent reason  Her blood sugars have been checked and found to have no hypoglycemia  She does eat her meals but then goes back to bed and lays down wearing the same clothing and refusing to change at all or get up and attend any groups  She continues to have other psychosomatic symptoms about not being able to breathe or not able to swallow and dying from some terrible disease like cancer which have not changed  She still  has multiple excuses for not taking showers or going to groups  She still accuses some  staff of playing with her medications like her  inhalers   She continues to believe that that there is a micro chip on her right forearm and still accuses the therapist of stealing her lover who is supposedly a medical doctor here but no one here knows who that medical doctor is   No current suicidal homicidal thoughts intent or plans reported  No overt hallucinations or other delusions reported   No signs or sxs of agranulocytosis or myocarditis or endocarditis and she is moving her bowels so far with no issues   Patient was again reminded to attend to ADLs skills and take showers at least every other day and attend more groups to keep up with her 25% expectation but she has been passive-aggressive and somewhat demanding on the unit with her unrealistic somatic complaints  She was told we cannot treat her for something she does not have and it is up to her to work with the program and get out and be back at the [facility]  Current medications:    Current Facility-Administered Medications:     acetaminophen (TYLENOL) tablet 650 mg, 650 mg, Oral, Q6H PRN, MD Roberto Stokes albuterol (PROVENTIL HFA,VENTOLIN HFA) inhaler 2 puff, 2 puff, Inhalation, Q4H PRN, Teri Huggins MD, 2 puff at 10/11/19 0424    aluminum-magnesium hydroxide-simethicone (MYLANTA) 200-200-20 mg/5 mL oral suspension 15 mL, 15 mL, Oral, Q4H PRN, Teri Huggins MD    ammonium lactate (LAC-HYDRIN) 12 % lotion 1 application, 1 application, Topical, BID PRN, Teri Huggins MD    benzonatate (TESSALON PERLES) capsule 100 mg, 100 mg, Oral, TID PRN, Teri Huggins MD    benztropine (COGENTIN) injection 1 mg, 1 mg, Intramuscular, Q8H PRN, Teri Huggins MD    cloZAPine (CLOZARIL) tablet 25 mg, 25 mg, Oral, BID, Teri Huggins MD, 25 mg at 11/11/19 2126    cloZAPine (CLOZARIL) tablet 50 mg, 50 mg, Oral, BID, Teri Huggins MD, 50 mg at 11/11/19 2126    EPINEPHrine PF (ADRENALIN) 1 mg/mL injection 0 15 mg, 0 15 mg, Intramuscular, Once PRN, Teri Huggins MD    fluticasone-vilanterol (BREO ELLIPTA) 200-25 MCG/INH inhaler 1 puff, 1 puff, Inhalation, Daily, Teri Huggins MD, 1 puff at 11/12/19 0843    ketotifen (ZADITOR) 0 025 % ophthalmic solution 1 drop, 1 drop, Right Eye, BID PRN, Teri Huggins MD    levothyroxine tablet 125 mcg, 125 mcg, Oral, Early Morning, Teri Huggins MD, 125 mcg at 11/12/19 3849    magnesium hydroxide (MILK OF MAGNESIA) 400 mg/5 mL oral suspension 30 mL, 30 mL, Oral, Daily PRN, Teri Huggins MD    montelukast (SINGULAIR) tablet 10 mg, 10 mg, Oral, HS, Teri Huggins MD, 10 mg at 11/07/19 2121    OLANZapine (ZyPREXA) IM injection 5 mg, 5 mg, Intramuscular, Q8H PRN, Teri Huggins MD    OLANZapine (ZyPREXA) tablet 5 mg, 5 mg, Oral, Q8H PRN, Teri Huggins MD    ondansetron (ZOFRAN-ODT) dispersible tablet 4 mg, 4 mg, Oral, Q6H PRN, Teri Huggins MD, 4 mg at 11/09/19 1223    pantoprazole (PROTONIX) EC tablet 40 mg, 40 mg, Oral, Early Morning, Teri Huggins MD, 40 mg at 11/12/19 0626    polyethylene glycol (MIRALAX) packet 17 g, 17 g, Oral, Daily PRN, Teri Huggins MD    polyvinyl alcohol (LIQUIFILM TEARS) 1 4 % ophthalmic solution 1 drop, 1 drop, Both Eyes, Q3H PRN, Krystyna Mcclain MD    sertraline (ZOLOFT) tablet 50 mg, 50 mg, Oral, Daily, Krystyna Mcclain MD, 50 mg at 11/12/19 0841    theophylline (JEF-24) 24 hr capsule 200 mg, 200 mg, Oral, Daily, Krystyna Mcclain MD, 200 mg at 11/12/19 0842    tiotropium (SPIRIVA) capsule for inhaler 18 mcg, 18 mcg, Inhalation, Daily, Krystyna Mcclain MD, 18 mcg at 11/12/19 0844    traZODone (DESYREL) tablet 25 mg, 25 mg, Oral, HS PRN, Krystyna Mcclain MD  Justification if on more than two antipsychotics:  Only on his clozapine  Side effects if any:  None    Risks , benefits, side effects and precautions of medications discussed with patient and reminded patient to let us know any problems with medications     Objective:     Vital Signs:  Vitals:    11/11/19 0700 11/11/19 1500 11/11/19 1900 11/12/19 0730   BP: 110/57 102/67 91/55 107/56   BP Location: Right arm Left arm Left arm Left arm   Pulse: 79 80 68 73   Resp: 18 15 15 16   Temp: 97 9 °F (36 6 °C) 97 7 °F (36 5 °C) 98 1 °F (36 7 °C) 98 °F (36 7 °C)   TempSrc:  Temporal Temporal Temporal   SpO2:  93% 93%    Weight:       Height:         Appearance:  age appropriate, casually dressed and overweight older than stated age, uncombed long grey hair, poorly groomed disheveled unkempt argumentative   Behavior:  evasive and guarded and suspicious but with good eye contact and preoccupied psychosomatic   Speech:  normal pitch and normal volume but circumstantial and tangential with stilted speech   Mood:  anxious and dysthymic   Affect:  mood-congruent, elated and entitled at times   Thought Process:  goal directed and illogical slightly pressured and tangential and talks as if in a court of law being stilted off and on   Thought Content:  delusions  grandiose and somatic type about not being able to breathe despite being on nasal oxygen and paranoid about people out to harm her and Atrovent inhaler tainted with peanut oil    No current suicidal homicidal thoughts intent or plans reported  No phobias obsessions or compulsions reported  Somatic delusions about having cancer or terrible illnesses like cancer or having a micro chip on her hand , her having low sugar and the therapist stealing her lover from her whom she identifies as a doctor  She believes that people are withholding information from her about her having cancer which is also a somatic delusion   Perceptual Disturbances: None and does not appear responding to internal stimuli   Risk Potential: Tendency to not care for herself if she goes off treatment   Sensorium:  person, place, time/date, situation, day of week, month of year and time   Cognition:  grossly intact   Consciousness:  alert and awake    Attention: Intact concentration and attention span   Intellect: Considered to be at least of average intelligence   Insight:  limited and in denial   Judgment: poor      Motor Activity: no abnormal movements         Recent Labs:  Results Reviewed     None          I/O Past 24 hours:  No intake/output data recorded  No intake/output data recorded          Assessment / Plan:     Schizoaffective disorder, [facility] ([facility]      Reason for continued inpatient care:  Because of underlying paranoia and refusal to go back to the personal care home and inability to care for herself on her own  Acceptance by patient:  Accepting  Jenn [place] in recovery:  About living in another personal care home once she feels better  Understanding of medications :  Has some understanding  Involved in reintegration process:  Adjusting to the unit  Trusting in relatoinship with psychiatrist:  Trusting    Recommended Treatment:    Medication changes:  1) no changes today   Non-pharmacological treatments  1) continue with  individual therapy group therapy, milieu therapy and occupational therapy and milieu therapy involving multidisciplinary team approach with psychotherapist, case management, nursing, peer support services, art therapist etc using recovery principles and psycho-education about accepting illness and need for treatment   3) encourage to attend to ADLs like taking showers and wearing clean clothes   4) Encourage to attend groups   Safety  1) Safety/communication plan established targeting dynamic risk factors above  Discharge Plan most likely back to the a:  Personal care boarding home with act services once her delusions are managed and mood becomes more stabilized and she becomes more receptive to treatment compliance    Counseling / Coordination of Care    Total floor / unit time spent today 15 minutes  Greater than 50% of total time was spent with the patient and / or family counseling and / or coordination of care  A description of the counseling / coordination of care  Patient's Rights, confidentiality and exceptions to confidentiality, use of automated medical record, [facility]eline staff access to medical record, and consent to treatment reviewed      Ervin Little MD

## 2019-11-12 NOTE — PROGRESS NOTES
During round found 02 concentrator increase by 2L/m done by patient  In bed with eyes closed calling for nurse  She did not like that she was caught increasing the concentrator  Now she state that she going to die she needs something to drink  Walk pass the nurse's station into the dining room sat for 40 minutes back into her room  Persistent that she need something to drink, water was given to her  She walk back into the dining room and into her room, lay down and yelling out for help  This writer offer her pudding, but she stated that she only wants soda, had to educate her on protein intake to maintain bloodsugar  Not satisfied with the answer given to her she return back to bed

## 2019-11-12 NOTE — PROGRESS NOTES
11/12/19 0800   Team Meeting   Meeting Type Daily Rounds   Team Members Present   Team Members Present Physician;Nurse;; Other (Discipline and Name)   Physician Team Member Dr Wally Jaramillo Team Member Mary Hooper, LISA   Care Management Team Member Brenda Oliveros   Other (Discipline and Name) Estefania Miller, JERW; Bonilla Ulloa, CPS     Patient's somatic behaviors continue  Patient asked multiple staff many times throughout the day to check her blood sugar, insisting she is hypoglycemic and needs juice, despite lack of medical documentation and/or history of same  Patient was yelling, insisting staff call [facility], insisting that she is dying  Patient did keep roommate awake throughout the night

## 2019-11-12 NOTE — PROGRESS NOTES
Progress Note - Drew Jefferson 1955, 60 y o  female MRN: [id number]    [facility] Encounter: [id number]    Primary Care Provider: Karen Holm PA-C   Date and time admitted to hospital: 7/23/2019  5:30 PM        * Schizoaffective disorder, bipolar type [facility])  Assessment & Plan  Psychiatry Progress Note  Patient is having  psychosomatic symptoms claiming that she has low blood sugar and demanding that she cannot breathe etc as excuses for her not attending any groups  She is demanding extra juice at times  making unrealistic demands for juice at odd times demanding to check her blood sugar for no apparent reason  Her blood sugars have been checked and found to have no hypoglycemia  She does eat her meals but then goes back to bed and lays down wearing the same clothing and refusing to change at all or get up and attend any groups  She continues to have other psychosomatic symptoms about not being able to breathe or not able to swallow and dying from some terrible disease like cancer which have not changed  She still  has multiple excuses for not taking showers or going to groups  She still accuses some  staff of playing with her medications like her  inhalers   She continues to believe that that there is a micro chip on her right forearm and still accuses the therapist of stealing her lover who is supposedly a medical doctor here but no one here knows who that medical doctor is   No current suicidal homicidal thoughts intent or plans reported  No overt hallucinations or other delusions reported   No signs or sxs of agranulocytosis or myocarditis or endocarditis and she is moving her bowels so far with no issues   Patient was again reminded to attend to ADLs skills and take showers at least every other day and attend more groups to keep up with her 25% expectation but she has been passive-aggressive and somewhat demanding on the unit with her unrealistic somatic complaints  She was told we cannot treat her for something she does not have and it is up to her to work with the program and get out and be back at the [facility]  Current medications:    Current Facility-Administered Medications:     acetaminophen (TYLENOL) tablet 650 mg, 650 mg, Oral, Q6H PRN, Carissa Willis MD    albuterol (PROVENTIL HFA,VENTOLIN HFA) inhaler 2 puff, 2 puff, Inhalation, Q4H PRN, Carissa Willis MD, 2 puff at 10/11/19 0424    aluminum-magnesium hydroxide-simethicone (MYLANTA) 200-200-20 mg/5 mL oral suspension 15 mL, 15 mL, Oral, Q4H PRN, Carissa Willis MD    ammonium lactate (LAC-HYDRIN) 12 % lotion 1 application, 1 application, Topical, BID PRN, Carissa Willis MD    benzonatate (TESSALON PERLES) capsule 100 mg, 100 mg, Oral, TID PRN, Carissa Willis MD    benztropine (COGENTIN) injection 1 mg, 1 mg, Intramuscular, Q8H PRN, Carissa Willis MD    cloZAPine (CLOZARIL) tablet 25 mg, 25 mg, Oral, BID, Carissa Willis MD, 25 mg at 11/11/19 2126    cloZAPine (CLOZARIL) tablet 50 mg, 50 mg, Oral, BID, Carissa Willis MD, 50 mg at 11/11/19 2126    EPINEPHrine PF (ADRENALIN) 1 mg/mL injection 0 15 mg, 0 15 mg, Intramuscular, Once PRN, Carissa Willis MD    fluticasone-vilanterol (BREO ELLIPTA) 200-25 MCG/INH inhaler 1 puff, 1 puff, Inhalation, Daily, Carissa Willis MD, 1 puff at 11/12/19 0871    ketotifen (ZADITOR) 0 025 % ophthalmic solution 1 drop, 1 drop, Right Eye, BID PRN, Carissa Willis MD    levothyroxine tablet 125 mcg, 125 mcg, Oral, Early Morning, Carissa Willis MD, 125 mcg at 11/12/19 9690    magnesium hydroxide (MILK OF MAGNESIA) 400 mg/5 mL oral suspension 30 mL, 30 mL, Oral, Daily PRN, Carissa Willis MD    montelukast (SINGULAIR) tablet 10 mg, 10 mg, Oral, HS, Carissa Willis MD, 10 mg at 11/07/19 2121    OLANZapine (ZyPREXA) IM injection 5 mg, 5 mg, Intramuscular, Q8H PRN, Carissa Willis MD    OLANZapine (ZyPREXA) tablet 5 mg, 5 mg, Oral, Q8H PRN, Carissa Willis MD    ondansetron (ZOFRAN-ODT) dispersible tablet 4 mg, 4 mg, Oral, Q6H PRN, Teri Huggins MD, 4 mg at 11/09/19 1223    pantoprazole (PROTONIX) EC tablet 40 mg, 40 mg, Oral, Early Morning, Teri Huggins MD, 40 mg at 11/12/19 3318    polyethylene glycol (MIRALAX) packet 17 g, 17 g, Oral, Daily PRN, Teri Huggins MD    polyvinyl alcohol (LIQUIFILM TEARS) 1 4 % ophthalmic solution 1 drop, 1 drop, Both Eyes, Q3H PRN, Teri Huggins MD    sertraline (ZOLOFT) tablet 50 mg, 50 mg, Oral, Daily, Teri Huggins MD, 50 mg at 11/12/19 0841    theophylline (JEF-24) 24 hr capsule 200 mg, 200 mg, Oral, Daily, Teri Huggins MD, 200 mg at 11/12/19 0842    tiotropium (SPIRIVA) capsule for inhaler 18 mcg, 18 mcg, Inhalation, Daily, Teri Huggins MD, 18 mcg at 11/12/19 0844    traZODone (DESYREL) tablet 25 mg, 25 mg, Oral, HS PRN, Teri Huggins MD  Justification if on more than two antipsychotics:  Only on his clozapine  Side effects if any:  None    Risks , benefits, side effects and precautions of medications discussed with patient and reminded patient to let us know any problems with medications     Objective:     Vital Signs:  Vitals:    11/11/19 0700 11/11/19 1500 11/11/19 1900 11/12/19 0730   BP: 110/57 102/67 91/55 107/56   BP Location: Right arm Left arm Left arm Left arm   Pulse: 79 80 68 73   Resp: 18 15 15 16   Temp: 97 9 °F (36 6 °C) 97 7 °F (36 5 °C) 98 1 °F (36 7 °C) 98 °F (36 7 °C)   TempSrc:  Temporal Temporal Temporal   SpO2:  93% 93%    Weight:       Height:         Appearance:  age appropriate, casually dressed and overweight older than stated age, uncombed long grey hair, poorly groomed disheveled unkempt argumentative   Behavior:  evasive and guarded and suspicious but with good eye contact and preoccupied psychosomatic   Speech:  normal pitch and normal volume but circumstantial and tangential with stilted speech   Mood:  anxious and dysthymic   Affect:  mood-congruent, elated and entitled at times   Thought Process:  goal directed and illogical slightly pressured and tangential and talks as if in a court of law being stilted off and on   Thought Content:  delusions  grandiose and somatic type about not being able to breathe despite being on nasal oxygen and paranoid about people out to harm her and Atrovent inhaler tainted with peanut oil  No current suicidal homicidal thoughts intent or plans reported  No phobias obsessions or compulsions reported  Somatic delusions about having cancer or terrible illnesses like cancer or having a micro chip on her hand , her having low sugar and the therapist stealing her lover from her whom she identifies as a doctor  She believes that people are withholding information from her about her having cancer which is also a somatic delusion   Perceptual Disturbances: None and does not appear responding to internal stimuli   Risk Potential: Tendency to not care for herself if she goes off treatment   Sensorium:  person, place, time/date, situation, day of week, month of year and time   Cognition:  grossly intact   Consciousness:  alert and awake    Attention: Intact concentration and attention span   Intellect: Considered to be at least of average intelligence   Insight:  limited and in denial   Judgment: poor      Motor Activity: no abnormal movements         Recent Labs:  Results Reviewed     None          I/O Past 24 hours:  No intake/output data recorded  No intake/output data recorded          Assessment / Plan:     [facility]ffective [facility]      Reason for continued inpatient care:  Because of underlying paranoia and refusal to go back to the personal care home and inability to care for herself on her own  Acceptance by patient:  Accepting  Rosa Noble in recovery:  About living in another personal care home once she feels better  Understanding of medications :  Has some understanding  Involved in reintegration process:  Adjusting to the unit  Trusting in relatoinship with psychiatrist:  Trusting    Recommended Treatment:    Medication changes:  1) no changes today   Non-pharmacological treatments  1) continue with  individual therapy group therapy, milieu therapy and occupational therapy and milieu therapy involving multidisciplinary team approach with psychotherapist, case management, nursing, peer support services, art therapist etc using recovery principles and psycho-education about accepting illness and need for treatment   3) encourage to attend to ADLs like taking showers and wearing clean clothes   4) Encourage to attend groups   Safety  1) Safety/communication plan established targeting dynamic risk factors above  Discharge Plan most likely back to the a:  Personal care boarding home with act services once her delusions are managed and mood becomes more stabilized and she becomes more receptive to treatment compliance    Counseling / Coordination of Care    Total floor / unit time spent today 15 minutes  Greater than 50% of total time was spent with the patient and / or family counseling and / or coordination of care  A description of the counseling / coordination of care  Patient's Rights, confidentiality and exceptions to confidentiality, use of automated medical record, [facility] staff access to medical record, and consent to treatment reviewed      Christian Bennett MD

## 2019-11-12 NOTE — PLAN OF CARE
Problem: Alteration in Thoughts and Perception  Goal: Verbalize thoughts and feelings  Description  Interventions:  - Promote a nonjudgmental and trusting relationship with the patient through active listening and therapeutic communication  - Assess patient's level of functioning, behavior and potential for risk  - Engage patient in 1 on 1 interactions for a minimum of 15 minutes each session  - Encourage patient to express fears, feelings, frustrations, and discuss symptoms    - [place] patient to reality, help patient recognize reality-based thinking   - Administer medications as ordered and assess for potential side effects  - Provide the patient education related to the signs and symptoms of the illness and desired effects of prescribed medications  Outcome: Progressing  Goal: Agree to be compliant with medication regime, as prescribed and report medication side effects  Description  Interventions:  - Offer appropriate PRN medication and supervise ingestion; conduct aims, as needed   Outcome: Progressing     Problem: Alteration in Thoughts and Perception  Goal: Attend and participate in unit activities, including therapeutic, recreational, and educational groups  Description  Interventions:  - Provide therapeutic and educational activities daily, encourage attendance and participation, and document same in the medical record     CERTIFIED PEER SPECIALIST INTERVENTIONS:    Complete peer assessment with patient to assess their needs and identify their goals to complete while in the recovery program as well as once discharged into the community  Patient will complete WRAP Plan, Crisis Plan and 5 Life Domains  Patient will attend 50% of groups offered on the unit  Patient will complete a goal card weekly      Outcome: Not Progressing  Goal: Recognize dysfunctional thoughts, communicate reality-based thoughts at the time of discharge  Description  Interventions:  - Provide medication and psycho-education to assist patient in compliance and developing insight into his/her illness   Outcome: Not Progressing     2010 Melyssa Rowe, "It's an emergency! Call [facility]!" Maintains her blood sugar is unstable, is weak, dizzy because eating poorly  Says is not tolerating the current Mechanical 2 diet well, continues to feel chokes if eats solids  Was receptive to perhaps have dietician revisit diet options (wants soup other than broth)  For meal ate bites of mashed potato, juice, coffee, Ensure  Did come up for her supper medicine  Worrisome, focus entirely somatic  Did not respond to invitation to PM Group  Isolates in room in bed when not airing somatic concerns w/staff

## 2019-11-12 NOTE — PROGRESS NOTES
11/12/19 0900   Team Meeting   Meeting Type Tx Team Meeting   Initial Conference Date 11/12/19   Next Conference Date 11/19/19   Team Members Present   Team Members Present Physician;Nurse;; Other (Discipline and Name)   Physician Team Member Dr Peewee Santizo Team Member Neymar Chatman, LISA   Care Management Team Member Brenda Ramirez   Other (Discipline and Name) Ita Joyce [place]   Patient/Family Present   Patient Present Yes   Patient's Family Present No     Patient attended treatment team meeting this morning without her self assessment completed  Patient attended 7% of groups offered last week  Patient still presents preoccupied and somatic in her complaints  She is unwilling to accept any direction from staff  Team and patient completed risk assessment and the patient did not verbalize any desire to elope from the program  Patient verbalized understanding of consequences of eloping from treatment while on a commitment  Patient verbalized no further questions or concerns at the conclusion of the meeting

## 2019-11-12 NOTE — PROGRESS NOTES
11/12/19 1100   Activity/Group Checklist   Group Other (Comment)  ([facility]- Acceptance)   Attendance Refused     Patient did not attend group today  Patient was encouraged to attend, but declined

## 2019-11-12 NOTE — PLAN OF CARE
Problem: PAIN - ADULT  Goal: Verbalizes/displays adequate comfort level or baseline comfort level  Description  Interventions:  - Encourage patient to monitor pain and request assistance  - Assess pain using appropriate pain scale  - Administer analgesics based on type and severity of pain and evaluate response  - Implement non-pharmacological measures as appropriate and evaluate response  - Consider cultural and social influences on pain and pain management  - Notify physician/advanced practitioner if interventions unsuccessful or patient reports new pain  Outcome: Progressing     Problem: SAFETY ADULT  Goal: Patient will remain free of falls  Description  INTERVENTIONS:  - Assess patient frequently for physical needs  -  Identify cognitive and physical deficits and behaviors that affect risk of falls    -  [place]ita fall precautions as indicated by assessment   - Educate patient/family on patient safety including physical limitations  - Instruct patient to call for assistance with activity based on assessment  - Modify environment to reduce risk of injury  - Consider OT/PT consult to assist with strengthening/mobility  Outcome: Progressing     Problem: DISCHARGE PLANNING  Goal: Discharge to home or other facility with appropriate resources  Description  INTERVENTIONS:  - Conduct assessment to determine patient/family and health care team treatment goals, and need for post-acute services based on payer coverage, community resources, and patient preferences, and barriers to discharge  - Address psychosocial, clinical, and financial barriers to discharge as identified in assessment in conjunction with the patient/family and health care team  - Assist the patient in reintegration back into the community by removing barriers which may hinder a successful discharge once deemed stable  - Arrange appropriate level of post-acute services according to patient's needs and preference and payer coverage in collaboration with the physician and health care team  - Communicate with and update the patient/family, physician, and health care team regarding progress on the discharge plan  - Arrange appropriate transportation to post-acute venues    Outcome: Progressing     Problem: SLEEP DISTURBANCE  Goal: Will exhibit normal sleeping pattern  Description  Interventions:  -  Assess the patients sleep pattern, noting recent changes  - Administer medication as ordered  - Decrease environmental stimuli, including noise, as appropriate during the night  - Encourage the patient to actively participate in unit groups and or exercise during the day to enhance ability to achieve adequate sleep at night  - Assess the patient, in the morning, encouraging a description of sleep experience  Outcome: Yoana Aparicio in bed with her eyes closed, breath even and unlabored   No respiratory distress noted   On O2 @1L/m with humidification via NC   Q 15 minutes checks during rounds continues   No behavioral noted thus far   No indication of pain or discomfort noted  No PRN needed for sleep   Maggie maintained on ongoing SAFE precaution without  incident on this shift  Will continue to monitor behavior and sleep pattern

## 2019-11-13 NOTE — ASSESSMENT & PLAN NOTE
Psychiatry Progress Note  Patient continues to have beliefs that she has low blood sugar and demanding that she cannot breathe etc as excuses for her not attending any groups or taking showers and is reportedly adjusting the oxygen concentrator on her own and not listening to the staff redirect  She is demanding extra juice at times  making unrealistic demands for juice at odd times demanding to check her blood sugar for no apparent reason and that has not changed either  Her blood sugars have been checked and found to have no hypoglycemia previously  She does eat her meals but then goes back to bed and lays down wearing the same clothing and refusing to change at all or get up and attend any groups and has not taken any showers since last Saturday even though she knows that she has to take showers every other day  She continues to have other psychosomatic symptoms about not being able to breathe or not able to swallow and dying from some terrible illnesses like cancer which have not changed at all  And claims that she will start going to groups again from today which again is doubtful  She still accuses some  staff of playing with her medications like her  inhalers states he does not trust anyone on the unit   She she today she tells me that she no longer believes there is a device implanted on her right forearm and still accuses the therapist of stealing her lover who is supposedly a medical doctor here  No current suicidal homicidal thoughts intent or plans reported  No overt hallucinations or other delusions reported   No signs or sxs of agranulocytosis or myocarditis or endocarditis and she is moving her bowels so far with no issues     She was again reminded to work with the program and work towards going back to the [address] [place] [address]   Current medications:    Current Facility-Administered Medications:     acetaminophen (TYLENOL) tablet 650 mg, 650 mg, Oral, Q6H PRN, MD Carla Ybarra albuterol (PROVENTIL HFA,VENTOLIN HFA) inhaler 2 puff, 2 puff, Inhalation, Q4H PRN, Felisa Florence MD, 2 puff at 10/11/19 0424    aluminum-magnesium hydroxide-simethicone (MYLANTA) 200-200-20 mg/5 mL oral suspension 15 mL, 15 mL, Oral, Q4H PRN, Felisa Florence MD    ammonium lactate (LAC-HYDRIN) 12 % lotion 1 application, 1 application, Topical, BID PRN, Felisa Florence MD    benzonatate (TESSALON PERLES) capsule 100 mg, 100 mg, Oral, TID PRN, Felisa Florence MD    benztropine (COGENTIN) injection 1 mg, 1 mg, Intramuscular, Q8H PRN, Felisa Florence MD    cloZAPine (CLOZARIL) tablet 25 mg, 25 mg, Oral, BID, Felisa Florence MD, 25 mg at 11/12/19 2142    cloZAPine (CLOZARIL) tablet 50 mg, 50 mg, Oral, BID, Felisa Florence MD, 50 mg at 11/12/19 2142    EPINEPHrine PF (ADRENALIN) 1 mg/mL injection 0 15 mg, 0 15 mg, Intramuscular, Once PRN, Felisa Florence MD    fluticasone-vilanterol (BREO ELLIPTA) 200-25 MCG/INH inhaler 1 puff, 1 puff, Inhalation, Daily, Felisa Florence MD, 1 puff at 11/13/19 0900    ketotifen (ZADITOR) 0 025 % ophthalmic solution 1 drop, 1 drop, Right Eye, BID PRN, Felisa Flroence MD    levothyroxine tablet 125 mcg, 125 mcg, Oral, Early Morning, Felisa Florence MD, 125 mcg at 11/13/19 0604    magnesium hydroxide (MILK OF MAGNESIA) 400 mg/5 mL oral suspension 30 mL, 30 mL, Oral, Daily PRN, Felisa Florence MD    montelukast (SINGULAIR) tablet 10 mg, 10 mg, Oral, HS, Felisa Florence MD, 10 mg at 11/12/19 2141    OLANZapine (ZyPREXA) IM injection 5 mg, 5 mg, Intramuscular, Q8H PRN, Felisa Florence MD    OLANZapine (ZyPREXA) tablet 5 mg, 5 mg, Oral, Q8H PRN, Felisa Florence MD    ondansetron (ZOFRAN-ODT) dispersible tablet 4 mg, 4 mg, Oral, Q6H PRN, Felisa Florence MD, 4 mg at 11/09/19 1223    pantoprazole (PROTONIX) EC tablet 40 mg, 40 mg, Oral, Early Morning, Felisa Florence MD, 40 mg at 11/13/19 0604    polyethylene glycol (MIRALAX) packet 17 g, 17 g, Oral, Daily PRN, Felisa Florence MD    polyvinyl alcohol (LIQUIFILM TEARS) 1 4 % ophthalmic solution 1 drop, 1 drop, Both Eyes, Q3H PRN, Sarah Hanna MD    sertraline (ZOLOFT) tablet 50 mg, 50 mg, Oral, Daily, Sarah Hanna MD, 50 mg at 11/13/19 0901    theophylline (JEF-24) 24 hr capsule 200 mg, 200 mg, Oral, Daily, Sarah Hanna MD, 200 mg at 11/13/19 0901    tiotropium (SPIRIVA) capsule for inhaler 18 mcg, 18 mcg, Inhalation, Daily, Sarah Hanna MD, 18 mcg at 11/13/19 0901    traZODone (DESYREL) tablet 25 mg, 25 mg, Oral, HS PRN, Sarah Hanna MD  Justification if on more than two antipsychotics:  Only on his clozapine  Side effects if any:  None    Risks , benefits, side effects and precautions of medications discussed with patient and reminded patient to let us know any problems with medications     Objective:     Vital Signs:  Vitals:    11/12/19 1500 11/12/19 1900 11/13/19 0700 11/13/19 0705   BP: 111/74 111/61 92/71 100/70   BP Location: Left arm Left arm Left arm Left arm   Pulse: 84 80 78 103   Resp: 16 16 18 18   Temp: (!) 97 4 °F (36 3 °C) (!) 97 °F (36 1 °C) 97 8 °F (36 6 °C)    TempSrc: Temporal Temporal Temporal    SpO2: 94% 92%     Weight:       Height:         Appearance:  age appropriate, casually dressed and overweight older than stated age, uncombed long grey hair, poorly groomed and unkempt disheveled    Behavior:  evasive and guarded and suspicious but with good eye contact and preoccupied psychosomatic and argumentative demanding to get blood sugar checked   Speech:  normal pitch and normal volume but circumstantial and tangential with stilted speech as usual   Mood:  anxious and dysthymic   Affect:  mood-congruent, elated and entitled off and   Thought Process:  goal directed and illogical slightly pressured and tangential and talks as if in a court of law   Thought Content:  delusions  grandiose and somatic type about not being able to breathe despite being on nasal oxygen and paranoid about people out to harm her and Atrovent inhaler tainted with peanut oil and does not trust the staff on the unit  No current suicidal homicidal thoughts intent or plans reported  No phobias obsessions or compulsions reported  Somatic delusions about having cancer or terrible illnesses like cancer or having a device implanted on her hand off and on , her having low sugar and the therapist stealing her lover from her whom she identifies as a doctor  She believes that people are withholding information from her about her having cancer    Perceptual Disturbances: None and does not appear responding to internal stimuli   Risk Potential: Tendency to not care for herself if she goes off treatment   Sensorium:  person, place, time/date, situation, day of week, month of year and time   Cognition:  grossly intact   Consciousness:  alert and awake    Attention: Intact concentration and attention span   Intellect: Considered to be at least of average intelligence   Insight:  limited and in denial of her illness   Judgment: poor      Motor Activity: no abnormal movements         Recent Labs:  Results Reviewed     None          I/O Past 24 hours:  No intake/output data recorded  No intake/output data recorded          Assessment / Plan:     Schizoaffective disorder, bipolar type ([facility] )      Reason for continued inpatient care:  Because of underlying paranoia and refusal to go back to the personal care home and inability to care for herself on her own  Acceptance by patient:  Accepting  Mateusz Mcghee in recovery:  About living in another personal care home once she feels better  Understanding of medications :  Has some understanding  Involved in reintegration process:  Adjusting to the unit  Trusting in relatoinship with psychiatrist:  Trusting    Recommended Treatment:    Medication changes:  1) no changes today   Non-pharmacological treatments  1) continue with  individual therapy group therapy, milieu therapy and occupational therapy and milieu therapy involving multidisciplinary team approach with psychotherapist, case management, nursing, peer support services, art therapist etc using recovery principles and psycho-education about accepting illness and need for treatment   3) encourage to attend to ADLs like taking showers and wearing clean clothes   4) Encourage to attend groups   Safety  1) Safety/communication plan established targeting dynamic risk factors above  Discharge Plan most likely back to the a:  Personal care boarding home with act services once her delusions are managed and mood becomes more stabilized and she becomes more receptive to treatment compliance    Counseling / Coordination of Care    Total floor / unit time spent today 15 minutes  Greater than 50% of total time was spent with the patient and / or family counseling and / or coordination of care  A description of the counseling / coordination of care  Patient's Rights, confidentiality and exceptions to confidentiality, use of automated medical record, [facility] staff access to medical record, and consent to treatment reviewed      Sindy Day MD

## 2019-11-13 NOTE — PLAN OF CARE
Problem: Alteration in Thoughts and Perception  Goal: Verbalize thoughts and feelings  Description  Interventions:  - Promote a nonjudgmental and trusting relationship with the patient through active listening and therapeutic communication  - Assess patient's level of functioning, behavior and potential for risk  - Engage patient in 1 on 1 interactions for a minimum of 15 minutes each session  - Encourage patient to express fears, feelings, frustrations, and discuss symptoms    - [place] patient to reality, help patient recognize reality-based thinking   - Administer medications as ordered and assess for potential side effects  - Provide the patient education related to the signs and symptoms of the illness and desired effects of prescribed medications  Outcome: Progressing  Goal: Agree to be compliant with medication regime, as prescribed and report medication side effects  Description  Interventions:  - Offer appropriate PRN medication and supervise ingestion; conduct aims, as needed   Outcome: Progressing  Goal: Attend and participate in unit activities, including therapeutic, recreational, and educational groups  Description  Interventions:  - Provide therapeutic and educational activities daily, encourage attendance and participation, and document same in the medical record     CERTIFIED PEER SPECIALIST INTERVENTIONS:    Complete peer assessment with patient to assess their needs and identify their goals to complete while in the recovery program as well as once discharged into the community  Patient will complete WRAP Plan, Crisis Plan and 5 Life Domains  Patient will attend 50% of groups offered on the unit  Patient will complete a goal card weekly      Outcome: Progressing     Problem: Ineffective Coping  Goal: Participates in unit activities  Description  Interventions:  - Provide therapeutic environment   - Provide required programming   - Redirect inappropriate behaviors   Outcome: Progressing  Goal: Patient/Family verbalizes awareness of resources  Outcome: Progressing  Goal: Understands least restrictive measures  Description  Interventions:  - Utilize least restrictive behavior  Outcome: Progressing     Problem: Risk for Self Injury/Neglect  Goal: Verbalize thoughts and feelings  Description  Interventions:  - Assess and re-assess patient's lethality and potential for self-injury  - Engage patient in 1:1 interactions, daily, for a minimum of 15 minutes  - Encourage patient to express feelings, fears, frustrations, hopes  - Establish rapport/trust with patient   Outcome: Progressing  Goal: Refrain from harming self  Description  Interventions:  - Monitor patient closely, per order  - Develop a trusting relationship  - Supervise medication ingestion, monitor effects and side effects   Outcome: Progressing  Goal: Attend and participate in unit activities, including therapeutic, recreational, and educational groups  Description  Interventions:  - Provide therapeutic and educational activities daily, encourage attendance and participation, and document same in the medical record  - Obtain collateral information, encourage visitation and family involvement in care   Outcome: Progressing     Problem: Depression  Goal: Verbalize thoughts and feelings  Description  Interventions:  - Assess and re-assess patient's level of risk   - Engage patient in 1:1 interactions, daily, for a minimum of 15 minutes   - Encourage patient to express feelings, fears, frustrations, hopes   Outcome: Progressing  Goal: Refrain from isolation  Description  Interventions:  - Develop a trusting relationship   - Encourage socialization   Outcome: Progressing     Problem: Anxiety  Goal: Anxiety is at manageable level  Description  Interventions:  - Assess and monitor patient's anxiety level     - Monitor for signs and symptoms of anxiety both physical and emotional (heart palpitations, chest pain, shortness of breath, headaches, nausea, feeling jumpy, restlessness, irritable, apprehensive)  - Collaborate with interdisciplinary team and initiate plan and interventions as ordered  - [place] patient to unit/surroundings  - Explain treatment plan  - Encourage participation in care  - Encourage verbalization of concerns/fears  - Identify coping mechanisms  - Assist in developing anxiety-reducing skills  - Administer/offer alternative therapies  - Limit or eliminate stimulants  Outcome: Progressing     Problem: Alteration in Orientation  Goal: Interact with staff daily  Description  Interventions:  - Assess and re-assess patient's level of orientation  - Engage patient in 1 on 1 interactions, daily, for a minimum of 15 minutes   - Establish rapport/trust with patient   Outcome: Progressing     Problem: PAIN - ADULT  Goal: Verbalizes/displays adequate comfort level or baseline comfort level  Description  Interventions:  - Encourage patient to monitor pain and request assistance  - Assess pain using appropriate pain scale  - Administer analgesics based on type and severity of pain and evaluate response  - Implement non-pharmacological measures as appropriate and evaluate response  - Consider cultural and social influences on pain and pain management  - Notify physician/advanced practitioner if interventions unsuccessful or patient reports new pain  Outcome: Progressing     Problem: SAFETY ADULT  Goal: Patient will remain free of falls  Description  INTERVENTIONS:  - Assess patient frequently for physical needs  -  Identify cognitive and physical deficits and behaviors that affect risk of falls    -  [place] fall precautions as indicated by assessment   - Educate patient/family on patient safety including physical limitations  - Instruct patient to call for assistance with activity based on assessment  - Modify environment to reduce risk of injury  - Consider OT/PT consult to assist with strengthening/mobility  Outcome: Progressing Problem: Nutrition/Hydration-ADULT  Goal: Nutrient/Hydration intake appropriate for improving, restoring or maintaining nutritional needs  Description  Monitor and assess patient's nutrition/hydration status for malnutrition  Collaborate with interdisciplinary team and initiate plan and interventions as ordered  Monitor patient's weight and dietary intake as ordered or per policy  Utilize nutrition screening tool and intervene as necessary  Determine patient's food preferences and provide high-protein, high-caloric foods as appropriate       INTERVENTIONS:  - Monitor oral intake, urinary output, labs, and treatment plans  - Assess nutrition and hydration status and recommend course of action  - Evaluate amount of meals eaten  - Assist patient with eating if necessary   - Allow adequate time for meals  - Recommend/ encourage appropriate diets, oral nutritional supplements, and vitamin/mineral supplements  - Order, calculate, and assess calorie counts as needed  - Recommend, monitor, and adjust tube feedings and TPN/PPN based on assessed needs  - Assess need for intravenous fluids  - Provide specific nutrition/hydration education as appropriate  - Include patient/family/caregiver in decisions related to nutrition  Outcome: Progressing     Problem: Alteration in Thoughts and Perception  Goal: Complete daily ADLs, including personal hygiene independently, as able  Description  Interventions:  - Observe, teach, and assist patient with ADLS  - Monitor and promote a balance of rest/activity, with adequate nutrition and elimination   Outcome: Not Progressing     Problem: Risk for Self Injury/Neglect  Goal: Complete daily ADLs, including personal hygiene independently, as able  Description  Interventions:  - Observe, teach, and assist patient with ADLS  - Monitor and promote a balance of rest/activity, with adequate nutrition and elimination  Outcome: Not Progressing     Problem: Depression  Goal: Refrain from self-neglect  Outcome: Not Progressing   The patient has been mostly isolative to her room, laying in her bed but did come out for meds and meals and attended IMR  No somatic complaints voiced  She still does not follow staff or doctor recommendations  When encouraged to shower she gave many excuses as to why she cannot, saying that she would do it "later"  Refused to use the incentive spirometer  Ate 50% of breakfast and 25% of lunch with an ensure  No other behaviors or issues noted  Continue to monitor

## 2019-11-13 NOTE — PLAN OF CARE
Problem: Alteration in Thoughts and Perception  Goal: Agree to be compliant with medication regime, as prescribed and report medication side effects  Description  Interventions:  - Offer appropriate PRN medication and supervise ingestion; conduct aims, as needed   Outcome: Progressing     Problem: Risk for Self Injury/Neglect  Goal: Refrain from harming self  Description  Interventions:  - Monitor patient closely, per order  - Develop a trusting relationship  - Supervise medication ingestion, monitor effects and side effects   Outcome: Progressing     Problem: Alteration in Orientation  Goal: Interact with staff daily  Description  Interventions:  - Assess and re-assess patient's level of orientation  - Engage patient in 1 on 1 interactions, daily, for a minimum of 15 minutes   - Establish rapport/trust with patient   Outcome: Progressing     Problem: Alteration in Thoughts and Perception  Goal: Attend and participate in unit activities, including therapeutic, recreational, and educational groups  Description  Interventions:  - Provide therapeutic and educational activities daily, encourage attendance and participation, and document same in the medical record     CERTIFIED PEER SPECIALIST INTERVENTIONS:    Complete peer assessment with patient to assess their needs and identify their goals to complete while in the recovery program as well as once discharged into the community  Patient will complete WRAP Plan, Crisis Plan and 5 Life Domains  Patient will attend 50% of groups offered on the unit  Patient will complete a goal card weekly      Outcome: Not Progressing  Goal: Complete daily ADLs, including personal hygiene independently, as able  Description  Interventions:  - Observe, teach, and assist patient with ADLS  - Monitor and promote a balance of rest/activity, with adequate nutrition and elimination   Outcome: Not Progressing     Problem: Depression  Goal: Refrain from self-neglect  Outcome: Not Valeriy Sethi has been out of her room more than usual this shift  She came out and sat in the chair by the phone  Had to get up and go to the dining room when peer wanted to talk on the phone  She did state that she is depressed  Did not require prn medication  Good eye contact when conversing  Took pills with water without swallowing issue  Ate 100% of her meal  Had a BM this shift  No shower  Unkept appearance  Her sister was here to visit  Interaction appeared to go well  Has not been somatic  Did not go to evening group or eat snack  Needed prompting for HS medication  Swallowed pills without difficulty  Oxygen 1 liter via nasal cannula applied prior to going to sleep  Continue to monitor  Precautions maintained

## 2019-11-13 NOTE — PROGRESS NOTES
Progress Note - Nina Bello 1955, 60 y o  female MRN: [id number]    [facility] Encounter: [id number]    Primary Care Provider: Kendrick Schultz PA-C   Date and time admitted to hospital: 7/23/2019  5:30 PM        * Schizoaffective disorder, bipolar type [facility])  Assessment & Plan  Psychiatry Progress Note  Patient continues to have beliefs that she has low blood sugar and demanding that she cannot breathe etc as excuses for her not attending any groups or taking showers and is reportedly adjusting the oxygen concentrator on her own and not listening to the staff redirect  She is demanding extra juice at times  making unrealistic demands for juice at odd times demanding to check her blood sugar for no apparent reason and that has not changed either  Her blood sugars have been checked and found to have no hypoglycemia previously  She does eat her meals but then goes back to bed and lays down wearing the same clothing and refusing to change at all or get up and attend any groups and has not taken any showers since last Saturday even though she knows that she has to take showers every other day  She continues to have other psychosomatic symptoms about not being able to breathe or not able to swallow and dying from some terrible illnesses like cancer which have not changed at all  And claims that she will start going to groups again from today which again is doubtful  She still accuses some  staff of playing with her medications like her  inhalers states he does not trust anyone on the unit   She she today she tells me that she no longer believes there is a device implanted on her right forearm and still accuses the therapist of stealing her lover who is supposedly a medical doctor here  No current suicidal homicidal thoughts intent or plans reported  No overt hallucinations or other delusions reported    No signs or sxs of agranulocytosis or myocarditis or endocarditis and she is moving her bowels so far with no issues     She was again reminded to work with the program and work towards going back to the [address] [place] [address]   Current medications:    Current Facility-Administered Medications:     acetaminophen (TYLENOL) tablet 650 mg, 650 mg, Oral, Q6H PRN, Deedee Muir MD    albuterol (PROVENTIL HFA,VENTOLIN HFA) inhaler 2 puff, 2 puff, Inhalation, Q4H PRN, Deedee Muir MD, 2 puff at 10/11/19 0424    aluminum-magnesium hydroxide-simethicone (MYLANTA) 200-200-20 mg/5 mL oral suspension 15 mL, 15 mL, Oral, Q4H PRN, Deedee Muir MD    ammonium lactate (LAC-HYDRIN) 12 % lotion 1 application, 1 application, Topical, BID PRN, Deedee Muir MD    benzonatate (TESSALON PERLES) capsule 100 mg, 100 mg, Oral, TID PRN, Deedee Muir MD    benztropine (COGENTIN) injection 1 mg, 1 mg, Intramuscular, Q8H PRN, eDedee Muir MD    cloZAPine (CLOZARIL) tablet 25 mg, 25 mg, Oral, BID, Deedee Muir MD, 25 mg at 11/12/19 2142    cloZAPine (CLOZARIL) tablet 50 mg, 50 mg, Oral, BID, Deedee Muir MD, 50 mg at 11/12/19 2142    EPINEPHrine PF (ADRENALIN) 1 mg/mL injection 0 15 mg, 0 15 mg, Intramuscular, Once PRN, Deedee Muir MD    fluticasone-vilanterol (BREO ELLIPTA) 200-25 MCG/INH inhaler 1 puff, 1 puff, Inhalation, Daily, Deedee Muir MD, 1 puff at 11/13/19 0900    ketotifen (ZADITOR) 0 025 % ophthalmic solution 1 drop, 1 drop, Right Eye, BID PRN, Deedee Muri MD    levothyroxine tablet 125 mcg, 125 mcg, Oral, Early Morning, Deedee Muir MD, 125 mcg at 11/13/19 0604    magnesium hydroxide (MILK OF MAGNESIA) 400 mg/5 mL oral suspension 30 mL, 30 mL, Oral, Daily PRN, Deedee Muir MD    montelukast (SINGULAIR) tablet 10 mg, 10 mg, Oral, HS, Deedee Muir MD, 10 mg at 11/12/19 2141    OLANZapine (ZyPREXA) IM injection 5 mg, 5 mg, Intramuscular, Q8H PRN, Deedee Muir MD    OLANZapine (ZyPREXA) tablet 5 mg, 5 mg, Oral, Q8H PRN, Deedee Muir MD    ondansetron (ZOFRAN-ODT) dispersible tablet 4 mg, 4 mg, Oral, Q6H PRN, Jeffrey Gallegos MD, 4 mg at 11/09/19 1223    pantoprazole (PROTONIX) EC tablet 40 mg, 40 mg, Oral, Early Morning, Jeffrey Gallegos MD, 40 mg at 11/13/19 0604    polyethylene glycol (MIRALAX) packet 17 g, 17 g, Oral, Daily PRN, Jeffrey Gallegos MD    polyvinyl alcohol (LIQUIFILM TEARS) 1 4 % ophthalmic solution 1 drop, 1 drop, Both Eyes, Q3H PRN, Jeffrey Gallegos MD    sertraline (ZOLOFT) tablet 50 mg, 50 mg, Oral, Daily, Jeffrey Gallegos MD, 50 mg at 11/13/19 0901    theophylline (JEF-24) 24 hr capsule 200 mg, 200 mg, Oral, Daily, Jeffrey Gallegos MD, 200 mg at 11/13/19 0901    tiotropium (SPIRIVA) capsule for inhaler 18 mcg, 18 mcg, Inhalation, Daily, Jeffrey Gallegos MD, 18 mcg at 11/13/19 0901    traZODone (DESYREL) tablet 25 mg, 25 mg, Oral, HS PRN, Jeffrey Gallegos MD  Justification if on more than two antipsychotics:  Only on his clozapine  Side effects if any:  None    Risks , benefits, side effects and precautions of medications discussed with patient and reminded patient to let us know any problems with medications     Objective:     Vital Signs:  Vitals:    11/12/19 1500 11/12/19 1900 11/13/19 0700 11/13/19 0705   BP: 111/74 111/61 92/71 100/70   BP Location: Left arm Left arm Left arm Left arm   Pulse: 84 80 78 103   Resp: 16 16 18 18   Temp: (!) 97 4 °F (36 3 °C) (!) 97 °F (36 1 °C) 97 8 °F (36 6 °C)    TempSrc: Temporal Temporal Temporal    SpO2: 94% 92%     Weight:       Height:         Appearance:  age appropriate, casually dressed and overweight older than stated age, uncombed long grey hair, poorly groomed and unkempt disheveled    Behavior:  evasive and guarded and suspicious but with good eye contact and preoccupied psychosomatic and argumentative demanding to get blood sugar checked   Speech:  normal pitch and normal volume but circumstantial and tangential with stilted speech as usual   Mood:  anxious and dysthymic   Affect:  mood-congruent, elated and entitled off and   Thought Process:  goal directed and illogical slightly pressured and tangential and talks as if in a court of law   Thought Content:  delusions  grandiose and somatic type about not being able to breathe despite being on nasal oxygen and paranoid about people out to harm her and Atrovent inhaler tainted with peanut oil and does not trust the staff on the unit  No current suicidal homicidal thoughts intent or plans reported  No phobias obsessions or compulsions reported  Somatic delusions about having cancer or terrible illnesses like cancer or having a device implanted on her hand off and on , her having low sugar and the therapist stealing her lover from her whom she identifies as a doctor  She believes that people are withholding information from her about her having cancer    Perceptual Disturbances: None and does not appear responding to internal stimuli   Risk Potential: Tendency to not care for herself if she goes off treatment   Sensorium:  person, place, time/date, situation, day of week, month of year and time   Cognition:  grossly intact   Consciousness:  alert and awake    Attention: Intact concentration and attention span   Intellect: Considered to be at least of average intelligence   Insight:  limited and in denial of her illness   Judgment: poor      Motor Activity: no abnormal movements         Recent Labs:  Results Reviewed     None          I/O Past 24 hours:  No intake/output data recorded  No intake/output data recorded          Assessment / Plan:     Schizoaffective disorder, [facility] ([facility]      Reason for continued inpatient care:  Because of underlying paranoia and refusal to go back to the personal care home and inability to care for herself on her own  Acceptance by patient:  Accepting  Letty Peters in recovery:  About living in another personal care home once she feels better  Understanding of medications :  Has some understanding  Involved in reintegration process:  Adjusting to the unit  Trusting in relatoinship with psychiatrist:  Trusting    Recommended Treatment:    Medication changes:  1) no changes today   Non-pharmacological treatments  1) continue with  individual therapy group therapy, milieu therapy and occupational therapy and milieu therapy involving multidisciplinary team approach with psychotherapist, case management, nursing, peer support services, art therapist etc using recovery principles and psycho-education about accepting illness and need for treatment   3) encourage to attend to ADLs like taking showers and wearing clean clothes   4) Encourage to attend groups   Safety  1) Safety/communication plan established targeting dynamic risk factors above  Discharge Plan most likely back to the a:  Personal care boarding home with act services once her delusions are managed and mood becomes more stabilized and she becomes more receptive to treatment compliance    Counseling / Coordination of Care    Total floor / unit time spent today 15 minutes  Greater than 50% of total time was spent with the patient and / or family counseling and / or coordination of care  A description of the counseling / coordination of care  Patient's Rights, confidentiality and exceptions to confidentiality, use of automated medical record, [facility] staff access to medical record, and consent to treatment reviewed      Faheem Daniels MD

## 2019-11-13 NOTE — PLAN OF CARE
Problem: Individualized Interventions  Goal: Attend and participate in unit activities, including therapeutic, recreational, and educational groups  Description    PSYCHOTHERAPY INTERVENTIONS:    -Form therapeutic alliance to promote trust and safety within a therapeutic environment    -Engage in individual psychotherapy using evidence-based practices that are specific to individual needs   -Process barriers to community living with an emphasis on solution-based interventions   -Promote self-awareness and identity development   -Identify and process patterns of behavior that have led to decompensation and/or hospitalizations   -Attend psychoeducation groups with licensed psychotherapist to learn about Illness Management Recovery (IMR) concepts and enhance coping skills    -Practice effective communication with staff, peers, family, and community members  Participate in family sessions as necessary   -Encourage reality-based thought content by examining thought processes and cognitive distortions      -Work with treatment team in reintegration back into the community when appropriate  Outcome: Progressing    Therapist and patient met for individual session  Patient reported that she attended IMR today, but went back to bed to "lick her wounds" after lunch because she was served unground meat  Patient acknowledges that her "sporatic swallowing issues" are psychosomatic, and therapist asked patient to explain what she meant by that  Patient was able to correctly define "psychosomatic "  Her description was contradictory in that she did not agree with the logic, "a general doctor sees you for medical issues, a psychiatrist sees you for psychosomatic issues "  Patient was cooperative with trying to explore the route of her psychosomatic issues  First, we discussed  the concept of secondary gain, which patient was familiar with, as she related to it to her mother    Patient reports that she was always taking care of her mother and "pretty much everyone else "  She agrees that she was in a "motherly" role or a "nursing" role for much of her life  Therapist posed the question, "if you were taking care of everyone, then who was taking care of you?"  Therapist and patient explored this to see if her former caretaking role could be related to her current "patient" role  Patient is very much in agreement that this connection is logical, but does not believe it applies in her situation  Patient was open and willing during this session, with good interaction  Patient is aware that if she cannot overcome her current anxiety and inflexibility regarding medical complaints, she may need to be moved to a higher level of care  Patient apparently ambivalent about this possibility  Patient will continue to assist patient in developing coping strategies and processing her barriers

## 2019-11-13 NOTE — PROGRESS NOTES
11/13/19 0900   Team Meeting   Meeting Type Daily Rounds   Team Members Present   Team Members Present Physician;Nurse;; Other (Discipline and Name)   Physician Team Member Dr Pia Edwards Team Member Roverto Gonzalez, LISA   Care Management Team Member Brenda Johnson   Other (Discipline and Name) Hollie Sharpe, [place]; SHANIKA Mason     Patient is still preoccupied with somatic complaints  Non complaint with staff  Slept

## 2019-11-14 NOTE — PLAN OF CARE
Problem: PAIN - ADULT  Goal: Verbalizes/displays adequate comfort level or baseline comfort level  Description  Interventions:  - Encourage patient to monitor pain and request assistance  - Assess pain using appropriate pain scale  - Administer analgesics based on type and severity of pain and evaluate response  - Implement non-pharmacological measures as appropriate and evaluate response  - Consider cultural and social influences on pain and pain management  - Notify physician/advanced practitioner if interventions unsuccessful or patient reports new pain  Outcome: Progressing     Problem: SAFETY ADULT  Goal: Patient will remain free of falls  Description  INTERVENTIONS:  - Assess patient frequently for physical needs  -  Identify cognitive and physical deficits and behaviors that affect risk of falls    -  [place] fall precautions as indicated by assessment   - Educate patient/family on patient safety including physical limitations  - Instruct patient to call for assistance with activity based on assessment  - Modify environment to reduce risk of injury  - Consider OT/PT consult to assist with strengthening/mobility  Outcome: Progressing     Problem: RESPIRATORY - ADULT  Goal: Achieves optimal ventilation and oxygenation  Description  INTERVENTIONS:  - Assess for changes in respiratory status  - Assess for changes in mentation and behavior  - Position to facilitate oxygenation and minimize respiratory effort  - Oxygen administration by appropriate delivery method based on oxygen saturation (per order) or ABGs  - Initiate smoking cessation education as indicated  - Encourage broncho-pulmonary hygiene including cough, deep breathe, Incentive Spirometry  - Assess the need for suctioning and aspirate as needed  - Assess and instruct to report SOB or any respiratory difficulty  - Respiratory Therapy support as indicated  Outcome: Mendy Bring maintained on ongoing SAFE precaution without incident on this shift  Laying in bed with her eyes closed, breath even and unlabored  No indication of pain or discomfort noted  No indication of respiratory distress noted  O2 @1L/m with humidification via NC  Thus far no behavior noted  Will continue to monitor behavior and sleep pattern

## 2019-11-14 NOTE — PROGRESS NOTES
Patient declined     11/14/19 1100   Activity/Group Checklist   Group   (IMR)   Attendance Did not attend   Attendance Duration (min) 46-60   Affect/Mood IRMA

## 2019-11-14 NOTE — PROGRESS NOTES
11/14/19 0900   Team Meeting   Meeting Type Daily Rounds   Team Members Present   Team Members Present Nurse;; Other (Discipline and Name)   Nursing Team Member Laure Avilez RN   Care Management Team Member Brenda Donis   Other (Discipline and Name) Sallie Zuleta; SHANIKA Humphrey     Patient presents with the same behaviors  Somatic complaints  Slept

## 2019-11-14 NOTE — QUICK NOTE
Patient is reporting complaints about hear diet  Upon chart review Gastroenterology and speech therapy both recommended in a chemical soft diet, and may progress to dysphagia 3 diet as tolerated with thin liquids  Diet is changed to dysphagia 3 diet, dental soft  Encourage patient to eat small portions to decrease symptoms  Will also start Carafate b i d   Discussed with patient we are going to discontinue ensure because we want her to increase her p o  Intake rather than relying on ensure  As of 10/22/2019 gastroenterology has signed off on patient as she would only need outpatient follow-up for her hiatal hernia  If surgery is desired this cannot be done at [facility]  No further investigation is needed as patient has had an EGD, barium swallow with speech study, and barium swallow all within the past month  Nursing staff also reports that patient required supplemental oxygen at night for her breathing, patient does have a background history of COPD that is well controlled and not an acute exacerbation  Noting the patient is increasing her oxygen on her own at night  Chart review shows the patient has had previous pulse oximetry ambulatory studies in July  Patient was 94% without oxygen at rest, and was 91% without oxygen while talking for 20 minutes  Respiratory clear patient for 1L oxygen nasal cannula at night  Discussed with patient that she should not be increasing her oxygen on her own  Patient agreed to this  No previous study for sleep apnea  However STOP-BANG risk assessment is low risk for obstructive sleep apnea  Staff reported the patient is lying in bed all day, discussed with patient that if she decides to do that she will need Lovenox injections daily  Patient agreed to start walking daily to avoid Lovenox  Overall patient states that she knows her symptoms are probably psychosomatic, however does not seem like she has good insight into what the term psychosomatic means  Would recommend further patient education       Temitope Peterson MD  11/14/19  1:56 PM

## 2019-11-14 NOTE — PROGRESS NOTES
Psychiatry Progress Note    Subjective: Interval History     "I'm probably going to die soon  I'm starving to death " Still somatically preoccupied  Mentions that she is only able to tolerate her desserts and liquids  Drinking two Ensure per day and requests that I investigate why her third Ensure was discontinued  Asks that Alexey Rodriguez be contacted today as she wants her PCP advised of her condition  States, "Let them know I may die from a lack of nourishment " Remains on soft diet  Feels depressed and more anxious due to her health concerns  "I'm so glad that Dr Bryson Gallegos isn't here  He would lecture me about not showering " Thus, patient remains resistive with hygiene  Not participating in groups due to her somatic issues  She denies any homicidal or suicidal ideations       Behavior over the last 24 hours:  unchanged  Sleep: normal  Appetite: normal  Medication side effects: No  ROS: no complaints    Current medications:    Current Facility-Administered Medications:     acetaminophen (TYLENOL) tablet 650 mg, 650 mg, Oral, Q6H PRN, Alirio Frazier MD    albuterol (PROVENTIL HFA,VENTOLIN HFA) inhaler 2 puff, 2 puff, Inhalation, Q4H PRN, Alirio Frazier MD, 2 puff at 10/11/19 0424    aluminum-magnesium hydroxide-simethicone (MYLANTA) 200-200-20 mg/5 mL oral suspension 15 mL, 15 mL, Oral, Q4H PRN, Alirio Frazier MD    ammonium lactate (LAC-HYDRIN) 12 % lotion 1 application, 1 application, Topical, BID PRN, Alirio Frazier MD    benzonatate (TESSALON PERLES) capsule 100 mg, 100 mg, Oral, TID PRN, Alirio Frazier MD    benztropine (COGENTIN) injection 1 mg, 1 mg, Intramuscular, Q8H PRN, Alirio Frazier MD    cloZAPine (CLOZARIL) tablet 25 mg, 25 mg, Oral, BID, Alirio Frazier MD, 25 mg at 11/13/19 2134    cloZAPine (CLOZARIL) tablet 50 mg, 50 mg, Oral, BID, Alirio Frazier MD, 50 mg at 11/13/19 2134    EPINEPHrine PF (ADRENALIN) 1 mg/mL injection 0 15 mg, 0 15 mg, Intramuscular, Once PRN, Alirio Frazier MD    fluticasone-vilanterol (BREO ELLIPTA) 200-25 MCG/INH inhaler 1 puff, 1 puff, Inhalation, Daily, Felisa Florence MD, 1 puff at 11/14/19 0855    ketotifen (ZADITOR) 0 025 % ophthalmic solution 1 drop, 1 drop, Right Eye, BID PRN, Felisa Florence MD    levothyroxine tablet 125 mcg, 125 mcg, Oral, Early Morning, Felisa Florence MD, 125 mcg at 11/14/19 0557    magnesium hydroxide (MILK OF MAGNESIA) 400 mg/5 mL oral suspension 30 mL, 30 mL, Oral, Daily PRN, Felisa Florence MD    montelukast (SINGULAIR) tablet 10 mg, 10 mg, Oral, HS, Felisa Florence MD, 10 mg at 11/12/19 2141    OLANZapine (ZyPREXA) IM injection 5 mg, 5 mg, Intramuscular, Q8H PRN, Felisa Florence MD    OLANZapine (ZyPREXA) tablet 5 mg, 5 mg, Oral, Q8H PRN, Felisa Florence MD    ondansetron (ZOFRAN-ODT) dispersible tablet 4 mg, 4 mg, Oral, Q6H PRN, Felisa Florecne MD, 4 mg at 11/09/19 1223    pantoprazole (PROTONIX) EC tablet 40 mg, 40 mg, Oral, Early Morning, Felisa Florence MD, 40 mg at 11/14/19 0557    polyethylene glycol (MIRALAX) packet 17 g, 17 g, Oral, Daily PRN, Felisa Florence MD    polyvinyl alcohol (LIQUIFILM TEARS) 1 4 % ophthalmic solution 1 drop, 1 drop, Both Eyes, Q3H PRN, Felisa Florence MD    sertraline (ZOLOFT) tablet 50 mg, 50 mg, Oral, Daily, Felisa Florence MD, 50 mg at 11/14/19 0853    sucralfate (CARAFATE) oral suspension 1,000 mg, 1,000 mg, Oral, BID, Jaiden Mcknight MD    theophylline (JEF-24) 24 hr capsule 200 mg, 200 mg, Oral, Daily, Felisa Florence MD, 200 mg at 11/14/19 0853    tiotropium (SPIRIVA) capsule for inhaler 18 mcg, 18 mcg, Inhalation, Daily, Felisa Folrence MD, 18 mcg at 11/14/19 0854    traZODone (DESYREL) tablet 25 mg, 25 mg, Oral, HS PRN, Felisa Florence MD    Current Problem List:    Patient Active Problem List   Diagnosis    Sepsis [facility]    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[facility]    Tobacco use disorder, continuous    [facility]    Left hip pain    Lactic acidosis    Hypokalemia    Hypomagnesemia    Compression fracture of L4 lumbar vertebra    Thoracic compression fracture (HCC)    Ventral hernia    Parapneumonic effusion    Acute on chronic respiratory failure with hypoxia (HCC)    Chronic respiratory failure (HCC)    Hypophosphatemia    Elevated MCV    Schizoaffective disorder, bipolar type (HCC)    Acquired hypothyroidism    Gastroesophageal reflux disease without esophagitis    Abnormal CT of the chest    Excessive cerumen in left ear canal    Lipoma of right upper extremity    Polydipsia    Localized swelling of both lower legs    Noncompliant with deep vein thrombosis (DVT) prophylaxis    Allergic conjunctivitis of right eye    At risk for aspiration       Problem list reviewed 11/14/19     Objective:     Vital Signs:  Vitals:    11/13/19 1918 11/14/19 0730 11/14/19 0732 11/14/19 1610   BP: 111/69 111/69 93/62 102/62   BP Location: Right arm Left arm Left arm Left arm   Pulse: 82 83 68 86   Resp: 16 17 18   Temp: 97 7 °F (36 5 °C) 97 7 °F (36 5 °C)  97 7 °F (36 5 °C)   TempSrc: Temporal Temporal  Temporal   SpO2: 95%   97%   Weight:       Height:             Appearance:  age appropriate and casually dressed   Behavior:  guarded   Speech:  normal volume   Mood:  anxious   Affect:  mood-congruent   Thought Process:  normal   Thought Content:  delusions  somatic   Perceptual Disturbances: None   Risk Potential: none   Sensorium:  person, place, situation and time   Cognition:  intact   Consciousness:  alert and awake    Attention: attention span and concentration were age appropriate   Intellect: average   Insight:  limited   Judgment: limited      Motor Activity: no abnormal movements       I/O Past 24 hours:  No intake/output data recorded  No intake/output data recorded  Labs:  Reviewed 11/14/19      Assessment / Plan:     [facility]oaffective disorder, bipolar type ([facility] )    Recommended Treatment:      Medication changes:  1) continue current medication regimen      Non-pharmacological treatments  1) Continue with group therapy, milieu therapy and occupational therapy  Safety  1) Safety/communication plan established targeting dynamic risk factors above  2) Risks, benefits, and possible side effects of medications explained to patient and patient verbalizes understanding  Counseling / Coordination of Care    Total floor / unit time spent today 20 minutes  Greater than 50% of total time was spent with the patient and / or family counseling and / or coordination of care  A description of the counseling / coordination of care  Patient's Rights, confidentiality and exceptions to confidentiality, use of automated medical record, [facility] staff access to medical record, and consent to treatment reviewed      Sol ABILIO Raya

## 2019-11-14 NOTE — PLAN OF CARE
Problem: Alteration in Thoughts and Perception  Goal: Verbalize thoughts and feelings  Description  Interventions:  - Promote a nonjudgmental and trusting relationship with the patient through active listening and therapeutic communication  - Assess patient's level of functioning, behavior and potential for risk  - Engage patient in 1 on 1 interactions for a minimum of 15 minutes each session  - Encourage patient to express fears, feelings, frustrations, and discuss symptoms    - [place] patient to reality, help patient recognize reality-based thinking   - Administer medications as ordered and assess for potential side effects  - Provide the patient education related to the signs and symptoms of the illness and desired effects of prescribed medications  Outcome: Progressing  Goal: Agree to be compliant with medication regime, as prescribed and report medication side effects  Description  Interventions:  - Offer appropriate PRN medication and supervise ingestion; conduct aims, as needed   Outcome: Progressing     Problem: Ineffective Coping  Goal: Patient/Family verbalizes awareness of resources  Outcome: Progressing  Goal: Understands least restrictive measures  Description  Interventions:  - Utilize least restrictive behavior  Outcome: Progressing     Problem: Risk for Self Injury/Neglect  Goal: Treatment Goal: Remain safe during length of stay, learn and adopt new coping skills, and be free of self-injurious ideation, impulses and acts at the time of discharge  Outcome: Progressing  Goal: Verbalize thoughts and feelings  Description  Interventions:  - Assess and re-assess patient's lethality and potential for self-injury  - Engage patient in 1:1 interactions, daily, for a minimum of 15 minutes  - Encourage patient to express feelings, fears, frustrations, hopes  - Establish rapport/trust with patient   Outcome: Progressing  Goal: Refrain from harming self  Description  Interventions:  - Monitor patient closely, per order  - Develop a trusting relationship  - Supervise medication ingestion, monitor effects and side effects   Outcome: Progressing     Problem: Depression  Goal: Verbalize thoughts and feelings  Description  Interventions:  - Assess and re-assess patient's level of risk   - Engage patient in 1:1 interactions, daily, for a minimum of 15 minutes   - Encourage patient to express feelings, fears, frustrations, hopes   Outcome: Progressing     Problem: Anxiety  Goal: Anxiety is at manageable level  Description  Interventions:  - Assess and monitor patient's anxiety level  - Monitor for signs and symptoms of anxiety both physical and emotional (heart palpitations, chest pain, shortness of breath, headaches, nausea, feeling jumpy, restlessness, irritable, apprehensive)  - Collaborate with interdisciplinary team and initiate plan and interventions as ordered    - [place] patient to unit/surroundings  - Explain treatment plan  - Encourage participation in care  - Encourage verbalization of concerns/fears  - Identify coping mechanisms  - Assist in developing anxiety-reducing skills  - Administer/offer alternative therapies  - Limit or eliminate stimulants  Outcome: Progressing     Problem: Alteration in Orientation  Goal: Interact with staff daily  Description  Interventions:  - Assess and re-assess patient's level of orientation  - Engage patient in 1 on 1 interactions, daily, for a minimum of 15 minutes   - Establish rapport/trust with patient   Outcome: Progressing     Problem: PAIN - ADULT  Goal: Verbalizes/displays adequate comfort level or baseline comfort level  Description  Interventions:  - Encourage patient to monitor pain and request assistance  - Assess pain using appropriate pain scale  - Administer analgesics based on type and severity of pain and evaluate response  - Implement non-pharmacological measures as appropriate and evaluate response  - Consider cultural and social influences on pain and pain management  - Notify physician/advanced practitioner if interventions unsuccessful or patient reports new pain  Outcome: Progressing     Problem: SAFETY ADULT  Goal: Patient will remain free of falls  Description  INTERVENTIONS:  - Assess patient frequently for physical needs  -  Identify cognitive and physical deficits and behaviors that affect risk of falls    -  [place] fall precautions as indicated by assessment   - Educate patient/family on patient safety including physical limitations  - Instruct patient to call for assistance with activity based on assessment  - Modify environment to reduce risk of injury  - Consider OT/PT consult to assist with strengthening/mobility  Outcome: Progressing     Problem: RESPIRATORY - ADULT  Goal: Achieves optimal ventilation and oxygenation  Description  INTERVENTIONS:  - Assess for changes in respiratory status  - Assess for changes in mentation and behavior  - Position to facilitate oxygenation and minimize respiratory effort  - Oxygen administration by appropriate delivery method based on oxygen saturation (per order) or ABGs  - Initiate smoking cessation education as indicated  - Encourage broncho-pulmonary hygiene including cough, deep breathe, Incentive Spirometry  - Assess the need for suctioning and aspirate as needed  - Assess and instruct to report SOB or any respiratory difficulty  - Respiratory Therapy support as indicated  Outcome: Progressing     Problem: Alteration in Thoughts and Perception  Goal: Treatment Goal: Gain control of psychotic behaviors/thinking, reduce/eliminate presenting symptoms and demonstrate improved reality functioning upon discharge  Outcome: Not Progressing  Goal: Attend and participate in unit activities, including therapeutic, recreational, and educational groups  Description  Interventions:  - Provide therapeutic and educational activities daily, encourage attendance and participation, and document same in the medical record CERTIFIED PEER SPECIALIST INTERVENTIONS:    Complete peer assessment with patient to assess their needs and identify their goals to complete while in the recovery program as well as once discharged into the community  Patient will complete WRAP Plan, Crisis Plan and 5 Life Domains  Patient will attend 50% of groups offered on the unit  Patient will complete a goal card weekly      Outcome: Not Progressing  Goal: Recognize dysfunctional thoughts, communicate reality-based thoughts at the time of discharge  Description  Interventions:  - Provide medication and psycho-education to assist patient in compliance and developing insight into his/her illness   Outcome: Not Progressing  Goal: Complete daily ADLs, including personal hygiene independently, as able  Description  Interventions:  - Observe, teach, and assist patient with ADLS  - Monitor and promote a balance of rest/activity, with adequate nutrition and elimination   Outcome: Not Progressing     Problem: Ineffective Coping  Goal: Identifies ineffective coping skills  Outcome: Not Progressing  Goal: Identifies healthy coping skills  Outcome: Not Progressing  Goal: Demonstrates healthy coping skills  Outcome: Not Progressing  Goal: Participates in unit activities  Description  Interventions:  - Provide therapeutic environment   - Provide required programming   - Redirect inappropriate behaviors   Outcome: Not Progressing     Problem: Risk for Self Injury/Neglect  Goal: Attend and participate in unit activities, including therapeutic, recreational, and educational groups  Description  Interventions:  - Provide therapeutic and educational activities daily, encourage attendance and participation, and document same in the medical record  - Obtain collateral information, encourage visitation and family involvement in care   Outcome: Not Progressing  Goal: Recognize maladaptive responses and adopt new coping mechanisms  Outcome: Not Progressing  Goal: Complete daily ADLs, including personal hygiene independently, as able  Description  Interventions:  - Observe, teach, and assist patient with ADLS  - Monitor and promote a balance of rest/activity, with adequate nutrition and elimination  Outcome: Not Progressing     Problem: Depression  Goal: Treatment Goal: Demonstrate behavioral control of depressive symptoms, verbalize feelings of improved mood/affect, and adopt new coping skills prior to discharge  Outcome: Not Progressing  Goal: Refrain from isolation  Description  Interventions:  - Develop a trusting relationship   - Encourage socialization   Outcome: Not Progressing  Goal: Refrain from self-neglect  Outcome: Not Progressing     Problem: Nutrition/Hydration-ADULT  Goal: Nutrient/Hydration intake appropriate for improving, restoring or maintaining nutritional needs  Description  Monitor and assess patient's nutrition/hydration status for malnutrition  Collaborate with interdisciplinary team and initiate plan and interventions as ordered  Monitor patient's weight and dietary intake as ordered or per policy  Utilize nutrition screening tool and intervene as necessary  Determine patient's food preferences and provide high-protein, high-caloric foods as appropriate       INTERVENTIONS:  - Monitor oral intake, urinary output, labs, and treatment plans  - Assess nutrition and hydration status and recommend course of action  - Evaluate amount of meals eaten  - Assist patient with eating if necessary   - Allow adequate time for meals  - Recommend/ encourage appropriate diets, oral nutritional supplements, and vitamin/mineral supplements  - Order, calculate, and assess calorie counts as needed  - Recommend, monitor, and adjust tube feedings and TPN/PPN based on assessed needs  - Assess need for intravenous fluids  - Provide specific nutrition/hydration education as appropriate  - Include patient/family/caregiver in decisions related to nutrition  Outcome: Not Progressing   The patient has been mostly isolative to her room, laying in her bed but did come out for meds and meals when announced  Remains somatic, insistent that she is physically ill, hypoglycemic, and unable to swallow despite being asymptomatic  She still does not follow staff or doctor recommendations  Still refuses to shower  Refused to use the incentive spirometer  Ate 25% of breakfast and lunch  Called and spoke with Dr Rhys Sarah, per his request to remind him to come see the patient  No other behaviors or issues noted  Continue to monitor

## 2019-11-14 NOTE — PLAN OF CARE
Problem: Alteration in Thoughts and Perception  Goal: Verbalize thoughts and feelings  Description  Interventions:  - Promote a nonjudgmental and trusting relationship with the patient through active listening and therapeutic communication  - Assess patient's level of functioning, behavior and potential for risk  - Engage patient in 1 on 1 interactions for a minimum of 15 minutes each session  - Encourage patient to express fears, feelings, frustrations, and discuss symptoms    - [place] patient to reality, help patient recognize reality-based thinking   - Administer medications as ordered and assess for potential side effects  - Provide the patient education related to the signs and symptoms of the illness and desired effects of prescribed medications  Outcome: Progressing  Goal: Agree to be compliant with medication regime, as prescribed and report medication side effects  Description  Interventions:  - Offer appropriate PRN medication and supervise ingestion; conduct aims, as needed   Outcome: Progressing  Goal: Recognize dysfunctional thoughts, communicate reality-based thoughts at the time of discharge  Description  Interventions:  - Provide medication and psycho-education to assist patient in compliance and developing insight into his/her illness   Outcome: Progressing     Problem: RESPIRATORY - ADULT  Goal: Achieves optimal ventilation and oxygenation  Description  INTERVENTIONS:  - Assess for changes in respiratory status  - Assess for changes in mentation and behavior  - Position to facilitate oxygenation and minimize respiratory effort  - Oxygen administration by appropriate delivery method based on oxygen saturation (per order) or ABGs  - Initiate smoking cessation education as indicated  - Encourage broncho-pulmonary hygiene including cough, deep breathe, Incentive Spirometry  - Assess the need for suctioning and aspirate as needed  - Assess and instruct to report SOB or any respiratory difficulty  - Respiratory Therapy support as indicated  Outcome: Progressing     Problem: Alteration in Thoughts and Perception  Goal: Attend and participate in unit activities, including therapeutic, recreational, and educational groups  Description  Interventions:  - Provide therapeutic and educational activities daily, encourage attendance and participation, and document same in the medical record     CERTIFIED PEER SPECIALIST INTERVENTIONS:    Complete peer assessment with patient to assess their needs and identify their goals to complete while in the recovery program as well as once discharged into the community  Patient will complete WRAP Plan, Crisis Plan and 5 Life Domains  Patient will attend 50% of groups offered on the unit  Patient will complete a goal card weekly  Outcome: Not Progressing  Goal: Complete daily ADLs, including personal hygiene independently, as able  Description  Interventions:  - Observe, teach, and assist patient with ADLS  - Monitor and promote a balance of rest/activity, with adequate nutrition and elimination   Outcome: Not Progressing     Problem: Nutrition/Hydration-ADULT  Goal: Nutrient/Hydration intake appropriate for improving, restoring or maintaining nutritional needs  Description  Monitor and assess patient's nutrition/hydration status for malnutrition  Collaborate with interdisciplinary team and initiate plan and interventions as ordered  Monitor patient's weight and dietary intake as ordered or per policy  Utilize nutrition screening tool and intervene as necessary  Determine patient's food preferences and provide high-protein, high-caloric foods as appropriate       INTERVENTIONS:  - Monitor oral intake, urinary output, labs, and treatment plans  - Assess nutrition and hydration status and recommend course of action  - Evaluate amount of meals eaten  - Assist patient with eating if necessary   - Allow adequate time for meals  - Recommend/ encourage appropriate diets, oral nutritional supplements, and vitamin/mineral supplements  - Order, calculate, and assess calorie counts as needed  - Recommend, monitor, and adjust tube feedings and TPN/PPN based on assessed needs  - Assess need for intravenous fluids  - Provide specific nutrition/hydration education as appropriate  - Include patient/family/caregiver in decisions related to nutrition  Outcome: Not Progressing     [address] Rausch continues fixated on her physical status, tonight particularly on her intake, blood sugar, breathing on 11-7's  At supper had bites mashed potato, juice, milk, coffee, Ensure  For HS snack had milk, bites of pudding, but, said couldn't swallow that either  Prefaces, "Even if it's psychosomatic", wanting relief from fear of choking  Says there needs to be collaboration between the psych & medical doctors, &, "I need to be heard"  Wants still to see [facility] to talk about the eating issue  Has difficulty recognizing the all encompassing quality of her somatic concerns to the near total exclusion of any other productive activity  Took her scheduled medicine except the Singulair  Used the [employer] getting consistent volumes of 1100-1250ml  Did not address any hygiene, did not attend PM Group  Wearing now her QHS humidified nasal O2 @ 1L for bed

## 2019-11-15 NOTE — PROGRESS NOTES
Progress Note - Erika Titus 1955, 60 y o  female MRN: [id number]    Unit/Bed#: [facility] 110-02 Encounter: [id number]    Primary Care Provider: Sheron Rangel PA-C   Date and time admitted to hospital: 7/23/2019  5:30 PM        * Schizoaffective disorder, bipolar type [facility])  Assessment & Plan  Psychiatry Progress Note  Patient was seen by medical yesterday and the discontinue the Ensure and  her about not messing up with the oxygen saturations settings on the machine  She is still expressing believes that she is going to die here and that she has a some kind of terrible illness like cancer and is demanding to check her blood sugar and claims that she is too weak to attend groups  Medical to tell her that if she does not get up and walk around she will be placed on Lovenox to prevent any DVTs  She does eat her meals but then goes back to bed and lays down wearing the same clothing and refusing to change at all or get up and attend any groups and has not taken any showers since last Saturday even though she knows that she has to take showers every other day and tends to lay back on her bed  She continues to have other psychosomatic symptoms about not being able to breathe or not able to swallow and dying from some terrible illnesses like cancer which have not changed at all  She still accuses some  staff of playing with her medications like her  inhalers states he does not trust anyone on the unit   She she today she tells me that she no longer believes there is a device implanted on her right forearm but no longer is accusing the therapist of stealing her lover   No current suicidal homicidal thoughts intent or plans reported  No overt hallucinations or other delusions reported   No signs or sxs of agranulocytosis or myocarditis or endocarditis and she is moving her bowels so far with no issues     She was again reminded to work with the program and work towards going back to the [facility] and start attending groups at least 25% of the time we can look into discharging her back to the personal care home   Current medications:    Current Facility-Administered Medications:     acetaminophen (TYLENOL) tablet 650 mg, 650 mg, Oral, Q6H PRN, Jill Gayle MD    albuterol (PROVENTIL HFA,VENTOLIN HFA) inhaler 2 puff, 2 puff, Inhalation, Q4H PRN, Jill Gayle MD, 2 puff at 10/11/19 0424    aluminum-magnesium hydroxide-simethicone (MYLANTA) 200-200-20 mg/5 mL oral suspension 15 mL, 15 mL, Oral, Q4H PRN, Jill Gayle MD    ammonium lactate (LAC-HYDRIN) 12 % lotion 1 application, 1 application, Topical, BID PRN, Jill Gayle MD    benzonatate (TESSALON PERLES) capsule 100 mg, 100 mg, Oral, TID PRN, Jill Gayle MD    benztropine (COGENTIN) injection 1 mg, 1 mg, Intramuscular, Q8H PRN, iJll Gayle MD    cloZAPine (CLOZARIL) tablet 25 mg, 25 mg, Oral, BID, Jill Gayle MD, 25 mg at 11/14/19 2138    cloZAPine (CLOZARIL) tablet 50 mg, 50 mg, Oral, BID, Jill Gayle MD, 50 mg at 11/14/19 2139    EPINEPHrine PF (ADRENALIN) 1 mg/mL injection 0 15 mg, 0 15 mg, Intramuscular, Once PRN, iJll Gayle MD    fluticasone-vilanterol (BREO ELLIPTA) 200-25 MCG/INH inhaler 1 puff, 1 puff, Inhalation, Daily, Jill Gayle MD, 1 puff at 11/15/19 0858    ketotifen (ZADITOR) 0 025 % ophthalmic solution 1 drop, 1 drop, Right Eye, BID PRN, Jill Gayle MD    levothyroxine tablet 125 mcg, 125 mcg, Oral, Early Morning, Jill Gayle MD, 125 mcg at 11/15/19 0542    magnesium hydroxide (MILK OF MAGNESIA) 400 mg/5 mL oral suspension 30 mL, 30 mL, Oral, Daily PRN, Jill Gayle MD    montelukast (SINGULAIR) tablet 10 mg, 10 mg, Oral, HS, Jill Gayle MD, 10 mg at 11/12/19 2141    OLANZapine (ZyPREXA) IM injection 5 mg, 5 mg, Intramuscular, Q8H PRN, Jill Gayle MD    OLANZapine (ZyPREXA) tablet 5 mg, 5 mg, Oral, Q8H PRN, Jill Gayle MD    ondansetron (ZOFRAN-ODT) dispersible tablet 4 mg, 4 mg, Oral, Q6H PRN, Jeet Levine MD, 4 mg at 11/09/19 1223    pantoprazole (PROTONIX) EC tablet 40 mg, 40 mg, Oral, Early Morning, Jeet Levine MD, 40 mg at 11/15/19 0542    polyethylene glycol (MIRALAX) packet 17 g, 17 g, Oral, Daily PRN, Jeet Levine MD    polyvinyl alcohol (LIQUIFILM TEARS) 1 4 % ophthalmic solution 1 drop, 1 drop, Both Eyes, Q3H PRN, Jeet Levine MD    sertraline (ZOLOFT) tablet 50 mg, 50 mg, Oral, Daily, Jeet Levine MD, 50 mg at 11/15/19 0859    sucralfate (CARAFATE) oral suspension 1,000 mg, 1,000 mg, Oral, BID, Cat Torres MD, 1,000 mg at 11/15/19 0600    theophylline (JEF-24) 24 hr capsule 200 mg, 200 mg, Oral, Daily, Jeet Levine MD, 200 mg at 11/15/19 0859    tiotropium (SPIRIVA) capsule for inhaler 18 mcg, 18 mcg, Inhalation, Daily, Jeet Levine MD, 18 mcg at 11/15/19 0858    traZODone (DESYREL) tablet 25 mg, 25 mg, Oral, HS PRN, Jeet Levine MD  Justification if on more than two antipsychotics:  Only on his clozapine  Side effects if any:  None    Risks , benefits, side effects and precautions of medications discussed with patient and reminded patient to let us know any problems with medications     Objective:     Vital Signs:  Vitals:    11/14/19 1610 11/14/19 1930 11/14/19 2140 11/15/19 0730   BP: 102/62 90/60  123/89   BP Location: Left arm Left arm     Pulse: 86 69  102   Resp: 18 15  19   Temp: 97 7 °F (36 5 °C) 98 °F (36 7 °C)  (!) 96 9 °F (36 1 °C)   TempSrc: Temporal Temporal     SpO2: 97% 93% 93%    Weight:       Height:         Appearance:  age appropriate, casually dressed and overweight older than stated age, uncombed long grey hair, poorly groomed and unkempt disheveled laying in bed refusing to get up   Behavior:  evasive and guarded and suspicious but with good eye contact and preoccupied psychosomatic and argumentative demanding and difficult to redirect   Speech:  normal pitch and normal volume but circumstantial and tangential with stilted speech   Mood:  anxious and dysthymic   Affect:  mood-congruent, elated and entitled off and   Thought Process:  goal directed and illogical slightly pressured and tangential and talks as if in a court of law   Thought Content:  delusions  grandiose and somatic type about not being able to breathe despite being on nasal oxygen and paranoid about people out to harm her and Atrovent inhaler tainted with peanut oil and does not trust the staff on the unit  No current suicidal homicidal thoughts intent or plans reported  No phobias obsessions or compulsions reported  Somatic delusions about having cancer or terrible illnesses like cancer or having a device implanted on her hand off and on , her having low sugar   She believes that people are withholding information from her about her having cancer or some kind of terrible illness and she believes she is dying from that illness   Perceptual Disturbances: None and does not appear responding to internal stimuli   Risk Potential: Tendency to not care for herself if she goes off treatment   Sensorium:  person, place, time/date, situation, day of week, month of year and time   Cognition:  grossly intact   Consciousness:  alert and awake    Attention: Intact concentration and attention span   Intellect: Considered to be at least of average intelligence   Insight:  limited and in denial of her illness   Judgment: poor      Motor Activity: no abnormal movements         Recent Labs:  Results Reviewed     None          I/O Past 24 hours:  No intake/output data recorded  No intake/output data recorded  Assessment / Plan:     [facility]ffective disorder, [facility]      Reason for continued inpatient care:  Because of underlying paranoia and refusal to go back to the personal care home and inability to care for herself on her own  Acceptance by patient:  Accepting  Hopefulness in recovery:  About living in another personal care home once she feels better  Understanding of medications :   Has some understanding  Involved in reintegration process:  Adjusting to the unit  Trusting in relatoinship with psychiatrist:  Trusting    Recommended Treatment:    Medication changes:  1) no changes today   Non-pharmacological treatments  1) continue with  individual therapy group therapy, milieu therapy and occupational therapy and milieu therapy involving multidisciplinary team approach with psychotherapist, case management, nursing, peer support services, art therapist etc using recovery principles and psycho-education about accepting illness and need for treatment   3) encourage to attend to ADLs like taking showers and wearing clean clothes   4) Encourage to attend groups   Safety  1) Safety/communication plan established targeting dynamic risk factors above  Discharge Plan most likely back to the a:  Personal care boarding home with [facility] once her delusions are managed and mood becomes more stabilized and she becomes more receptive to treatment compliance    Counseling / Coordination of Care    Total floor / unit time spent today 15 minutes  Greater than 50% of total time was spent with the patient and / or family counseling and / or coordination of care  A description of the counseling / coordination of care  Patient's Rights, confidentiality and exceptions to confidentiality, use of automated medical record, [address] [place] [address] staff access to medical record, and consent to treatment reviewed      Manuel Copeland MD

## 2019-11-15 NOTE — PLAN OF CARE
Problem: PAIN - ADULT  Goal: Verbalizes/displays adequate comfort level or baseline comfort level  Description  Interventions:  - Encourage patient to monitor pain and request assistance  - Assess pain using appropriate pain scale  - Administer analgesics based on type and severity of pain and evaluate response  - Implement non-pharmacological measures as appropriate and evaluate response  - Consider cultural and social influences on pain and pain management  - Notify physician/advanced practitioner if interventions unsuccessful or patient reports new pain  Outcome: Progressing     Problem: SAFETY ADULT  Goal: Patient will remain free of falls  Description  INTERVENTIONS:  - Assess patient frequently for physical needs  -  Identify cognitive and physical deficits and behaviors that affect risk of falls    -  [place] fall precautions as indicated by assessment   - Educate patient/family on patient safety including physical limitations  - Instruct patient to call for assistance with activity based on assessment  - Modify environment to reduce risk of injury  - Consider OT/PT consult to assist with strengthening/mobility  Outcome: Progressing     Problem: RESPIRATORY - ADULT  Goal: Achieves optimal ventilation and oxygenation  Description  INTERVENTIONS:  - Assess for changes in respiratory status  - Assess for changes in mentation and behavior  - Position to facilitate oxygenation and minimize respiratory effort  - Oxygen administration by appropriate delivery method based on oxygen saturation (per order) or ABGs  - Initiate smoking cessation education as indicated  - Encourage broncho-pulmonary hygiene including cough, deep breathe, Incentive Spirometry  - Assess the need for suctioning and aspirate as needed  - Assess and instruct to report SOB or any respiratory difficulty  - Respiratory Therapy support as indicated  Outcome: Progressing     Problem: SLEEP DISTURBANCE  Goal: Will exhibit normal sleeping pattern  Description  Interventions:  -  Assess the patients sleep pattern, noting recent changes  - Administer medication as ordered  - Decrease environmental stimuli, including noise, as appropriate during the night  - Encourage the patient to actively participate in unit groups and or exercise during the day to enhance ability to achieve adequate sleep at night  - Assess the patient, in the morning, encouraging a description of sleep experience  Outcome: Memo Bonds maintained on ongoing SAFE precaution without incident on this shift  Laying in bed with her eyes closed, breath even and unlabored  No indication of pain or discomfort noted  No indication of respiratory distress noted  Orpha Arsen has been adjusting the volume of the O2 to 2L/m on her own  Staff redirected need to educate her, setting reset to 1L/m by staff  O2 @1L/m with humidification via NC     Will continue to monitor behavior and sleep pattern

## 2019-11-15 NOTE — PROGRESS NOTES
11/15/19 0800   Team Meeting   Meeting Type Daily Rounds   Team Members Present   Team Members Present Nurse; Other (Discipline and Name)   Nursing Team Member Lyndsey Olivas RN   Care Management Team Member Brenda Rendon   Other (Discipline and Name) Libertad Ferrer LCSW     No groups  Ensure was discontinued as medical staff would like her eating solid food  She was also instructed to start ambulating so there is no need for Luvenox

## 2019-11-15 NOTE — PROGRESS NOTES
11/15/19 1100   Activity/Group Checklist   Group Other (Comment)  (IMR - Weekly Goal Review)   Attendance Did not attend     Patient was encouraged to attend IMR that focused on Weekly Goal Review, as well as review of Patient Responsibilities, Self-Assessment, and group schedule changes  Patient declined to attend despite personal prompt  Goal card and a copy of Patient Responsibilities provided to patient after group

## 2019-11-15 NOTE — PLAN OF CARE
Problem: Alteration in Thoughts and Perception  Goal: Treatment Goal: Gain control of psychotic behaviors/thinking, reduce/eliminate presenting symptoms and demonstrate improved reality functioning upon discharge  Outcome: Progressing  Goal: Verbalize thoughts and feelings  Description  Interventions:  - Promote a nonjudgmental and trusting relationship with the patient through active listening and therapeutic communication  - Assess patient's level of functioning, behavior and potential for risk  - Engage patient in 1 on 1 interactions for a minimum of 15 minutes each session  - Encourage patient to express fears, feelings, frustrations, and discuss symptoms    - [place] patient to reality, help patient recognize reality-based thinking   - Administer medications as ordered and assess for potential side effects  - Provide the patient education related to the signs and symptoms of the illness and desired effects of prescribed medications  Outcome: Progressing  Goal: Agree to be compliant with medication regime, as prescribed and report medication side effects  Description  Interventions:  - Offer appropriate PRN medication and supervise ingestion; conduct aims, as needed   Outcome: Progressing     Problem: Alteration in Thoughts and Perception  Goal: Attend and participate in unit activities, including therapeutic, recreational, and educational groups  Description  Interventions:  - Provide therapeutic and educational activities daily, encourage attendance and participation, and document same in the medical record     CERTIFIED PEER SPECIALIST INTERVENTIONS:    Complete peer assessment with patient to assess their needs and identify their goals to complete while in the recovery program as well as once discharged into the community  Patient will complete WRAP Plan, Crisis Plan and [employer] Life Domains  Patient will attend 50% of groups offered on the unit  Patient will complete a goal card weekly      Outcome: Not Progressing  Goal: Recognize dysfunctional thoughts, communicate reality-based thoughts at the time of discharge  Description  Interventions:  - Provide medication and psycho-education to assist patient in compliance and developing insight into his/her illness   Outcome: Not Progressing  Goal: Complete daily ADLs, including personal hygiene independently, as able  Description  Interventions:  - Observe, teach, and assist patient with ADLS  - Monitor and promote a balance of rest/activity, with adequate nutrition and elimination   Outcome: Not Progressing     Problem: Ineffective Coping  Goal: Identifies ineffective coping skills  Outcome: Not Progressing  Goal: Identifies healthy coping skills  Outcome: Not Progressing  Goal: Demonstrates healthy coping skills  Outcome: Not Progressing  Goal: Participates in unit activities  Description  Interventions:  - Provide therapeutic environment   - Provide required programming   - Redirect inappropriate behaviors   Outcome: Not Progressing  Goal: Patient/Family participate in treatment and DC plans  Description  Interventions:  - Provide therapeutic environment  Outcome: Not Progressing  Goal: Patient/Family verbalizes awareness of resources  Outcome: Not Progressing  Goal: Understands least restrictive measures  Description  Interventions:  - Utilize least restrictive behavior  Outcome: Not Progressing     Problem: Risk for Self Injury/Neglect  Goal: Treatment Goal: Remain safe during length of stay, learn and adopt new coping skills, and be free of self-injurious ideation, impulses and acts at the time of discharge  Outcome: Not Progressing  Goal: Verbalize thoughts and feelings  Description  Interventions:  - Assess and re-assess patient's lethality and potential for self-injury  - Engage patient in 1:1 interactions, daily, for a minimum of 15 minutes  - Encourage patient to express feelings, fears, frustrations, hopes  - Establish rapport/trust with patient Outcome: Not Progressing  Goal: Refrain from harming self  Description  Interventions:  - Monitor patient closely, per order  - Develop a trusting relationship  - Supervise medication ingestion, monitor effects and side effects   Outcome: Not Progressing  Goal: Attend and participate in unit activities, including therapeutic, recreational, and educational groups  Description  Interventions:  - Provide therapeutic and educational activities daily, encourage attendance and participation, and document same in the medical record  - Obtain collateral information, encourage visitation and family involvement in care   Outcome: Not Progressing  Goal: Recognize maladaptive responses and adopt new coping mechanisms  Outcome: Not Progressing  Goal: Complete daily ADLs, including personal hygiene independently, as able  Description  Interventions:  - Observe, teach, and assist patient with ADLS  - Monitor and promote a balance of rest/activity, with adequate nutrition and elimination  Outcome: Not Progressing     Problem: Depression  Goal: Treatment Goal: Demonstrate behavioral control of depressive symptoms, verbalize feelings of improved mood/affect, and adopt new coping skills prior to discharge  Outcome: Not Progressing  Goal: Verbalize thoughts and feelings  Description  Interventions:  - Assess and re-assess patient's level of risk   - Engage patient in 1:1 interactions, daily, for a minimum of 15 minutes   - Encourage patient to express feelings, fears, frustrations, hopes   Outcome: Not Progressing  Goal: Refrain from isolation  Description  Interventions:  - Develop a trusting relationship   - Encourage socialization   Outcome: Not Progressing  Goal: Refrain from self-neglect  Outcome: Not Progressing     Problem: Anxiety  Goal: Anxiety is at manageable level  Description  Interventions:  - Assess and monitor patient's anxiety level     - Monitor for signs and symptoms of anxiety both physical and emotional (heart palpitations, chest pain, shortness of breath, headaches, nausea, feeling jumpy, restlessness, irritable, apprehensive)  - Collaborate with interdisciplinary team and initiate plan and interventions as ordered  - [place] patient to unit/surroundings  - Explain treatment plan  - Encourage participation in care  - Encourage verbalization of concerns/fears  - Identify coping mechanisms  - Assist in developing anxiety-reducing skills  - Administer/offer alternative therapies  - Limit or eliminate stimulants  Outcome: Not Progressing     Problem: Alteration in Orientation  Goal: Interact with staff daily  Description  Interventions:  - Assess and re-assess patient's level of orientation  - Engage patient in 1 on 1 interactions, daily, for a minimum of 15 minutes   - Establish rapport/trust with patient   Outcome: Not Progressing  Goal: Cooperate with recommended testing/procedures  Description  Interventions:  - Determine need for ancillary testing  - Observe for mental status changes  - Implement falls/precaution protocol   Outcome: Not Progressing     Problem: PAIN - ADULT  Goal: Verbalizes/displays adequate comfort level or baseline comfort level  Description  Interventions:  - Encourage patient to monitor pain and request assistance  - Assess pain using appropriate pain scale  - Administer analgesics based on type and severity of pain and evaluate response  - Implement non-pharmacological measures as appropriate and evaluate response  - Consider cultural and social influences on pain and pain management  - Notify physician/advanced practitioner if interventions unsuccessful or patient reports new pain  Outcome: Not Progressing     Problem: SAFETY ADULT  Goal: Patient will remain free of falls  Description  INTERVENTIONS:  - Assess patient frequently for physical needs  -  Identify cognitive and physical deficits and behaviors that affect risk of falls  -  [place] fall precautions as indicated by assessment    - Educate patient/family on patient safety including physical limitations  - Instruct patient to call for assistance with activity based on assessment  - Modify environment to reduce risk of injury  - Consider OT/PT consult to assist with strengthening/mobility  Outcome: Not Progressing     Problem: RESPIRATORY - ADULT  Goal: Achieves optimal ventilation and oxygenation  Description  INTERVENTIONS:  - Assess for changes in respiratory status  - Assess for changes in mentation and behavior  - Position to facilitate oxygenation and minimize respiratory effort  - Oxygen administration by appropriate delivery method based on oxygen saturation (per order) or ABGs  - Initiate smoking cessation education as indicated  - Encourage broncho-pulmonary hygiene including cough, deep breathe, Incentive Spirometry  - Assess the need for suctioning and aspirate as needed  - Assess and instruct to report SOB or any respiratory difficulty  - Respiratory Therapy support as indicated  Outcome: Not Progressing     Problem: DISCHARGE PLANNING  Goal: Discharge to home or other facility with appropriate resources  Description  INTERVENTIONS:  - Conduct assessment to determine patient/family and health care team treatment goals, and need for post-acute services based on payer coverage, community resources, and patient preferences, and barriers to discharge  - Address psychosocial, clinical, and financial barriers to discharge as identified in assessment in conjunction with the patient/family and health care team  - Assist the patient in reintegration back into the community by removing barriers which may hinder a successful discharge once deemed stable  - Arrange appropriate level of post-acute services according to patient's needs and preference and payer coverage in collaboration with the physician and health care team  - Communicate with and update the patient/family, physician, and health care team regarding progress on the discharge plan  - Arrange appropriate transportation to post-acute venues    Outcome: Not Progressing     Problem: SLEEP DISTURBANCE  Goal: Will exhibit normal sleeping pattern  Description  Interventions:  -  Assess the patients sleep pattern, noting recent changes  - Administer medication as ordered  - Decrease environmental stimuli, including noise, as appropriate during the night  - Encourage the patient to actively participate in unit groups and or exercise during the day to enhance ability to achieve adequate sleep at night  - Assess the patient, in the morning, encouraging a description of sleep experience  Outcome: Not Progressing     Problem: Nutrition/Hydration-ADULT  Goal: Nutrient/Hydration intake appropriate for improving, restoring or maintaining nutritional needs  Description  Monitor and assess patient's nutrition/hydration status for malnutrition  Collaborate with interdisciplinary team and initiate plan and interventions as ordered  Monitor patient's weight and dietary intake as ordered or per policy  Utilize nutrition screening tool and intervene as necessary  Determine patient's food preferences and provide high-protein, high-caloric foods as appropriate  INTERVENTIONS:  - Monitor oral intake, urinary output, labs, and treatment plans  - Assess nutrition and hydration status and recommend course of action  - Evaluate amount of meals eaten  - Assist patient with eating if necessary   - Allow adequate time for meals  - Recommend/ encourage appropriate diets, oral nutritional supplements, and vitamin/mineral supplements  - Order, calculate, and assess calorie counts as needed  - Recommend, monitor, and adjust tube feedings and TPN/PPN based on assessed needs  - Assess need for intravenous fluids  - Provide specific nutrition/hydration education as appropriate  - Include patient/family/caregiver in decisions related to nutrition  Outcome: Not Progressing    Patient is alert, isolative to room and bed  Non compliant with program and hygiene  Denies depression and anxiety but behaviors contradict same  Poor appetite r/t somatic belief of eating and swallowing difficulties  Ensure was discontinued by medical and patient verbalized displeasure  Compliant with meds  No c/o s/s pain, discomfort or distress  No socialization with staff or peers    Will continue to monitor progress in recovery program    Addendum: labs complete, no abnormalities

## 2019-11-15 NOTE — PROGRESS NOTES
Pharmacy Clozapine Monitoring Progress Note     Erika Titus is receiving a total daily dose of 150 mg of clozapine divided as 75mg at 1600 and 75mg at 2000  Pharmacist Recommendations Based on Assessment and Plan (below):    1  Patient is Clozapine-REMS eligible, ANC was updated, with weekly monitoring frequency and the next [address] is due on 11/22/2019       2  Recommend repeat ECG for myocarditis monitoring and QTC prolongation due to antipsychotic  Assessment/Plan:    Phase of Treatment:     Current phase of treatment is initation/titration  Patient Clozapine REMS Eligibility:     Patient is eligible to receive clozapine and the [address] monitoring frequency is weekly per clozapine REMS  The patient's latest [address] value has been updated into the Clozapine-REMS program          ANC and Clozapine Level (if available)     The most recent [address] was   Lab Results   Component Value Date    NEUTROABS 3 30 11/15/2019    and the next lab is due on 11/22/19  Last Clozapine level (if available):    0   Lab Value Date/Time    CLOZAPINE 324 (L) 08/16/2019 1131     0   Lab Value Date/Time    NCLOZIP 207 08/16/2019 1131           Monitoring Parameters for Clozapine:     Clozapine Adverse Effect Suggested Monitoring Patient's Current Status    Agranulocytosis ANC baseline and then repeat weekly for first 6 months and every 2 weeks for the second 6 months  Maintenance after one year of therapy every month  The most recent [address] was   Lab Results   Component Value Date    NEUTROABS 3 30 11/15/2019       This ANC is considered normal range (ANC >/= 1500/mcL)  Continue current treatment and monitoring course  Respiratory Depression Please ensure patient is not on concomitant respiratory lowering medications such as benzodiazepines, sedative hypnotics (eg  zolpidem) This patient is not on respiratory depression lowering medications   Myocarditis Baseline and weekly EKG, CRP, BNP, and troponin    Weekly symptomatic complaints of fatigue, dyspnea, tachycardia, chest pain, palpitations, and fever for the first 4 weeks  Last EKG Results on  8/21/19 showed:     "Sinus tachycardia  Left axis deviation  Left anterior fascicular block  Low voltage QRS  Abnormal ECG  When compared with ECG of 13-AUG-2019 13:45,  Nonspecific T wave abnormality now evident in Lateral leads  Confirmed by Dilan Soni ([id number]) on 8/22/2019 8:20:02 AM"      Last QTC value was:  0   Lab Value Date/Time    QTCINT 427 08/21/2019 1133       Last BNP was: No results found for: NTBNP    Last Troponin was:  0   Lab Value Date/Time    TROPONINI <0 01 05/01/2019 1318    TROPONINI <0 04 05/10/2015 1948        Orthostatic hypotension/  bradycardia Blood pressure and vital signs      Monitor blood pressure and orthostatic hypotension at least twice daily upon initiation and continue to follow at least once daily afterwards  Patient's last recorded vital signs:       /68 (BP Location: Left arm)   Pulse 84   Temp 97 8 °F (36 6 °C) (Temporal)   Resp 18   Ht 5' 4" (1 626 m)   Wt 66 7 kg (147 lb)   SpO2 97%   BMI 25 23 kg/m²             Constipation (bowel obstruction due to high anticholinergic clozapine burden) Assess at baseline and weekly during first four months of therapy  Docusate 100mg BID and Miralax 17 grams daily recommended initially  Prophylactic bowel regimen ordered and includes: sucralfate   Sialorrhea Assess at baseline and weekly during first four months of therapy  May be managed using sublingual anticholinergic preparations (atropine 1% eye drops)  Patient is not experiencing sialorrhea  This will continue to be monitored  Please note that per current REMS protocol the provider can submit a treatment rationale that justifies clozapine treatment even if patient parameters are outside of REMS recommendations  The Clozapine REMS is an FDA-mandated program implemented by the manufacturers of clozapine  ([employer]  com/CpmgClozapineUI/home  u)  Pharmacy will continue to follow patient with team, thank you    Electronically signed by: Gavin Pappas, PharmD, [address], Clinical Pharmacist - Psychiatry What Type Of Note Output Would You Prefer (Optional)?: Standard Output How Severe Is Your Skin Lesion?: moderate Has Your Skin Lesion Been Treated?: not been treated Is This A New Presentation, Or A Follow-Up?: [place]

## 2019-11-15 NOTE — ASSESSMENT & PLAN NOTE
Psychiatry Progress Note  Patient was seen by medical yesterday and the discontinue the Ensure and  her about not messing up with the oxygen saturations settings on the machine  She is still expressing believes that she is going to die here and that she has a some kind of terrible illness like cancer and is demanding to check her blood sugar and claims that she is too weak to attend groups  Medical to tell her that if she does not get up and walk around she will be placed on Lovenox to prevent any DVTs  She does eat her meals but then goes back to bed and lays down wearing the same clothing and refusing to change at all or get up and attend any groups and has not taken any showers since last Saturday even though she knows that she has to take showers every other day and tends to lay back on her bed  She continues to have other psychosomatic symptoms about not being able to breathe or not able to swallow and dying from some terrible illnesses like cancer which have not changed at all  She still accuses some  staff of playing with her medications like her  inhalers states he does not trust anyone on the unit   She she today she tells me that she no longer believes there is a device implanted on her right forearm but no longer is accusing the therapist of stealing her lover   No current suicidal homicidal thoughts intent or plans reported  No overt hallucinations or other delusions reported   No signs or sxs of agranulocytosis or myocarditis or endocarditis and she is moving her bowels so far with no issues     She was again reminded to work with the program and work towards going back to the [facility] and start attending groups at least 25% of the time we can look into discharging her back to the personal care home   Current medications:    Current Facility-Administered Medications:     acetaminophen (TYLENOL) tablet 650 mg, 650 mg, Oral, Q6H PRN, Ivonne Nevarez MD    albuterol (PROVENTIL HFA,VENTOLIN HFA) inhaler 2 puff, 2 puff, Inhalation, Q4H PRN, Greer Beltran MD, 2 puff at 10/11/19 0424    aluminum-magnesium hydroxide-simethicone (MYLANTA) 200-200-20 mg/5 mL oral suspension 15 mL, 15 mL, Oral, Q4H PRN, Greer Beltran MD    ammonium lactate (LAC-HYDRIN) 12 % lotion 1 application, 1 application, Topical, BID PRN, Greer Beltran MD    benzonatate (TESSALON PERLES) capsule 100 mg, 100 mg, Oral, TID PRN, Greer Beltran MD    benztropine (COGENTIN) injection 1 mg, 1 mg, Intramuscular, Q8H PRN, Greer Beltran MD    cloZAPine (CLOZARIL) tablet 25 mg, 25 mg, Oral, BID, Greer Beltran MD, 25 mg at 11/14/19 2138    cloZAPine (CLOZARIL) tablet 50 mg, 50 mg, Oral, BID, Greer Beltran MD, 50 mg at 11/14/19 2139    EPINEPHrine PF (ADRENALIN) 1 mg/mL injection 0 15 mg, 0 15 mg, Intramuscular, Once PRN, Greer Beltran MD    fluticasone-vilanterol (BREO ELLIPTA) 200-25 MCG/INH inhaler 1 puff, 1 puff, Inhalation, Daily, Greer Beltran MD, 1 puff at 11/15/19 0858    ketotifen (ZADITOR) 0 025 % ophthalmic solution 1 drop, 1 drop, Right Eye, BID PRN, Greer Beltran MD    levothyroxine tablet 125 mcg, 125 mcg, Oral, Early Morning, Greer Beltran MD, 125 mcg at 11/15/19 0542    magnesium hydroxide (MILK OF MAGNESIA) 400 mg/5 mL oral suspension 30 mL, 30 mL, Oral, Daily PRN, Greer Beltran MD    montelukast (SINGULAIR) tablet 10 mg, 10 mg, Oral, HS, Greer Beltran MD, 10 mg at 11/12/19 2141    OLANZapine (ZyPREXA) IM injection 5 mg, 5 mg, Intramuscular, Q8H PRN, Greer Beltran MD    OLANZapine (ZyPREXA) tablet 5 mg, 5 mg, Oral, Q8H PRN, Greer Beltran MD    ondansetron (ZOFRAN-ODT) dispersible tablet 4 mg, 4 mg, Oral, Q6H PRN, Greer Beltran MD, 4 mg at 11/09/19 1223    pantoprazole (PROTONIX) EC tablet 40 mg, 40 mg, Oral, Early Morning, Greer Beltran MD, 40 mg at 11/15/19 0542    polyethylene glycol (MIRALAX) packet 17 g, 17 g, Oral, Daily PRN, Greer Beltran MD    polyvinyl alcohol (LIQUIFILM TEARS) 1 4 % ophthalmic solution 1 drop, 1 drop, Both Eyes, Q3H PRN, Jaz Beasley MD    sertraline (ZOLOFT) tablet 50 mg, 50 mg, Oral, Daily, Jaz Beasley MD, 50 mg at 11/15/19 0859    sucralfate (CARAFATE) oral suspension 1,000 mg, 1,000 mg, Oral, BID, Cat Torres MD, 1,000 mg at 11/15/19 0600    theophylline (JEF-24) 24 hr capsule 200 mg, 200 mg, Oral, Daily, Jaz Beasley MD, 200 mg at 11/15/19 0859    tiotropium (SPIRIVA) capsule for inhaler 18 mcg, 18 mcg, Inhalation, Daily, Jaz Beasley MD, 18 mcg at 11/15/19 0858    traZODone (DESYREL) tablet 25 mg, 25 mg, Oral, HS PRN, Jaz Beasley MD  Justification if on more than two antipsychotics:  Only on his clozapine  Side effects if any:  None    Risks , benefits, side effects and precautions of medications discussed with patient and reminded patient to let us know any problems with medications     Objective:     Vital Signs:  Vitals:    11/14/19 1610 11/14/19 1930 11/14/19 2140 11/15/19 0730   BP: 102/62 90/60  123/89   BP Location: Left arm Left arm     Pulse: 86 69  102   Resp: 18 15  19   Temp: 97 7 °F (36 5 °C) 98 °F (36 7 °C)  (!) 96 9 °F (36 1 °C)   TempSrc: Temporal Temporal     SpO2: 97% 93% 93%    Weight:       Height:         Appearance:  age appropriate, casually dressed and overweight older than stated age, uncombed long grey hair, poorly groomed and unkempt disheveled laying in bed refusing to get up   Behavior:  evasive and guarded and suspicious but with good eye contact and preoccupied psychosomatic and argumentative demanding and difficult to redirect   Speech:  normal pitch and normal volume but circumstantial and tangential with stilted speech   Mood:  anxious and dysthymic   Affect:  mood-congruent, elated and entitled off and   Thought Process:  goal directed and illogical slightly pressured and tangential and talks as if in a court of law   Thought Content:  delusions  grandiose and somatic type about not being able to breathe despite being on nasal oxygen and paranoid about people out to harm her and Atrovent inhaler tainted with peanut oil and does not trust the staff on the unit  No current suicidal homicidal thoughts intent or plans reported  No phobias obsessions or compulsions reported  Somatic delusions about having cancer or terrible illnesses like cancer or having a device implanted on her hand off and on , her having low sugar   She believes that people are withholding information from her about her having cancer or some kind of terrible illness and she believes she is dying from that illness   Perceptual Disturbances: None and does not appear responding to internal stimuli   Risk Potential: Tendency to not care for herself if she goes off treatment   Sensorium:  person, place, time/date, situation, day of week, month of year and time   Cognition:  grossly intact   Consciousness:  alert and awake    Attention: Intact concentration and attention span   Intellect: Considered to be at least of average intelligence   Insight:  limited and in denial of her illness   Judgment: poor      Motor Activity: no abnormal movements         Recent Labs:  Results Reviewed     None          I/O Past 24 hours:  No intake/output data recorded  No intake/output data recorded          Assessment / Plan:     Schizoaffective disorder, bipolar type ([facility] )      Reason for continued inpatient care:  Because of underlying paranoia and refusal to go back to the personal care home and inability to care for herself on her own  Acceptance by patient:  Accepting  Rosa Noble in recovery:  About living in another personal care home once she feels better  Understanding of medications :  Has some understanding  Involved in reintegration process:  Adjusting to the unit  Trusting in relatoinship with psychiatrist:  Trusting    Recommended Treatment:    Medication changes:  1) no changes today   Non-pharmacological treatments  1) continue with  individual therapy group therapy, milieu therapy and occupational therapy and milieu therapy involving multidisciplinary team approach with psychotherapist, case management, nursing, peer support services, art therapist etc using recovery principles and psycho-education about accepting illness and need for treatment   3) encourage to attend to ADLs like taking showers and wearing clean clothes   4) Encourage to attend groups   Safety  1) Safety/communication plan established targeting dynamic risk factors above  Discharge Plan most likely back to the a:  Personal care boarding home with act services once her delusions are managed and mood becomes more stabilized and she becomes more receptive to treatment compliance    Counseling / Coordination of Care    Total floor / unit time spent today 15 minutes  Greater than 50% of total time was spent with the patient and / or family counseling and / or coordination of care  A description of the counseling / coordination of care  Patient's Rights, confidentiality and exceptions to confidentiality, use of automated medical record, [facility] record, and consent to treatment reviewed      Deep Durand MD

## 2019-11-15 NOTE — PLAN OF CARE
Problem: Alteration in Thoughts and Perception  Goal: Agree to be compliant with medication regime, as prescribed and report medication side effects  Description  Interventions:  - Offer appropriate PRN medication and supervise ingestion; conduct aims, as needed   Outcome: Progressing     Problem: Alteration in Thoughts and Perception  Goal: Attend and participate in unit activities, including therapeutic, recreational, and educational groups  Description  Interventions:  - Provide therapeutic and educational activities daily, encourage attendance and participation, and document same in the medical record     CERTIFIED PEER SPECIALIST INTERVENTIONS:    Complete peer assessment with patient to assess their needs and identify their goals to complete while in the recovery program as well as once discharged into the community  Patient will complete WRAP Plan, Crisis Plan and 5 Life Domains  Patient will attend 50% of groups offered on the unit  Patient will complete a goal card weekly  Outcome: Not Progressing  Goal: Complete daily ADLs, including personal hygiene independently, as able  Description  Interventions:  - Observe, teach, and assist patient with ADLS  - Monitor and promote a balance of rest/activity, with adequate nutrition and elimination   Outcome: Not Progressing     Problem: Ineffective Coping  Goal: Demonstrates healthy coping skills  Outcome: Not Progressing     Problem: Depression  Goal: Refrain from isolation  Description  Interventions:  - Develop a trusting relationship   - Encourage socialization   Outcome: Not Progressing  Goal: Refrain from self-neglect  Outcome: Not Progressing   [address] has been isolative to her room & bed throughout shift  She has made no effort to groom, has not done any walking as promised to her Medical doctor today  She did leave room to eat bites of mashed potato & meat, drink her juice & milk   Refused the Carafate before meal, refused the HS Singulair  Otherwise did come up for scheduled medicines  Some socialization @ table w/peers during meal  Did not attend Women's Group or PM Group  Did have an HS snack of milk, applesauce, bites pudding  Did poorly w/Incentive Spirometer tonight, reaching only 750ml, saying too full from milk to perform  Is wearing now her QHS humidified nasal O2 @ 1L now for bed

## 2019-11-16 NOTE — PLAN OF CARE
Problem: Alteration in Thoughts and Perception  Goal: Verbalize thoughts and feelings  Description  Interventions:  - Promote a nonjudgmental and trusting relationship with the patient through active listening and therapeutic communication  - Assess patient's level of functioning, behavior and potential for risk  - Engage patient in 1 on 1 interactions for a minimum of 15 minutes each session  - Encourage patient to express fears, feelings, frustrations, and discuss symptoms    - [place] patient to reality, help patient recognize reality-based thinking   - Administer medications as ordered and assess for potential side effects  - Provide the patient education related to the signs and symptoms of the illness and desired effects of prescribed medications  Outcome: Progressing  Goal: Agree to be compliant with medication regime, as prescribed and report medication side effects  Description  Interventions:  - Offer appropriate PRN medication and supervise ingestion; conduct aims, as needed   Outcome: Progressing     Problem: Anxiety  Goal: Anxiety is at manageable level  Description  Interventions:  - Assess and monitor patient's anxiety level  - Monitor for signs and symptoms of anxiety both physical and emotional (heart palpitations, chest pain, shortness of breath, headaches, nausea, feeling jumpy, restlessness, irritable, apprehensive)  - Collaborate with interdisciplinary team and initiate plan and interventions as ordered    - [place] patient to unit/surroundings  - Explain treatment plan  - Encourage participation in care  - Encourage verbalization of concerns/fears  - Identify coping mechanisms  - Assist in developing anxiety-reducing skills  - Administer/offer alternative therapies  - Limit or eliminate stimulants  Outcome: Progressing     Problem: PAIN - ADULT  Goal: Verbalizes/displays adequate comfort level or baseline comfort level  Description  Interventions:  - Encourage patient to monitor pain and request assistance  - Assess pain using appropriate pain scale  - Administer analgesics based on type and severity of pain and evaluate response  - Implement non-pharmacological measures as appropriate and evaluate response  - Consider cultural and social influences on pain and pain management  - Notify physician/advanced practitioner if interventions unsuccessful or patient reports new pain  Outcome: Progressing     Problem: SAFETY ADULT  Goal: Patient will remain free of falls  Description  INTERVENTIONS:  - Assess patient frequently for physical needs  -  Identify cognitive and physical deficits and behaviors that affect risk of falls    -  [place] fall precautions as indicated by assessment   - Educate patient/family on patient safety including physical limitations  - Instruct patient to call for assistance with activity based on assessment  - Modify environment to reduce risk of injury  - Consider OT/PT consult to assist with strengthening/mobility  Outcome: Progressing     Problem: RESPIRATORY - ADULT  Goal: Achieves optimal ventilation and oxygenation  Description  INTERVENTIONS:  - Assess for changes in respiratory status  - Assess for changes in mentation and behavior  - Position to facilitate oxygenation and minimize respiratory effort  - Oxygen administration by appropriate delivery method based on oxygen saturation (per order) or ABGs  - Initiate smoking cessation education as indicated  - Encourage broncho-pulmonary hygiene including cough, deep breathe, Incentive Spirometry  - Assess the need for suctioning and aspirate as needed  - Assess and instruct to report SOB or any respiratory difficulty  - Respiratory Therapy support as indicated  Outcome: Progressing     Problem: SLEEP DISTURBANCE  Goal: Will exhibit normal sleeping pattern  Description  Interventions:  -  Assess the patients sleep pattern, noting recent changes  - Administer medication as ordered  - Decrease environmental stimuli, including noise, as appropriate during the night  - Encourage the patient to actively participate in unit groups and or exercise during the day to enhance ability to achieve adequate sleep at night  - Assess the patient, in the morning, encouraging a description of sleep experience  Outcome: Progressing     Problem: Nutrition/Hydration-ADULT  Goal: Nutrient/Hydration intake appropriate for improving, restoring or maintaining nutritional needs  Description  Monitor and assess patient's nutrition/hydration status for malnutrition  Collaborate with interdisciplinary team and initiate plan and interventions as ordered  Monitor patient's weight and dietary intake as ordered or per policy  Utilize nutrition screening tool and intervene as necessary  Determine patient's food preferences and provide high-protein, high-caloric foods as appropriate       INTERVENTIONS:  - Monitor oral intake, urinary output, labs, and treatment plans  - Assess nutrition and hydration status and recommend course of action  - Evaluate amount of meals eaten  - Assist patient with eating if necessary   - Allow adequate time for meals  - Recommend/ encourage appropriate diets, oral nutritional supplements, and vitamin/mineral supplements  - Order, calculate, and assess calorie counts as needed  - Recommend, monitor, and adjust tube feedings and TPN/PPN based on assessed needs  - Assess need for intravenous fluids  - Provide specific nutrition/hydration education as appropriate  - Include patient/family/caregiver in decisions related to nutrition  Outcome: Progressing     Problem: Alteration in Thoughts and Perception  Goal: Treatment Goal: Gain control of psychotic behaviors/thinking, reduce/eliminate presenting symptoms and demonstrate improved reality functioning upon discharge  Outcome: Not Progressing  Goal: Attend and participate in unit activities, including therapeutic, recreational, and educational groups  Description  Interventions:  - Provide therapeutic and educational activities daily, encourage attendance and participation, and document same in the medical record     CERTIFIED PEER SPECIALIST INTERVENTIONS:    Complete peer assessment with patient to assess their needs and identify their goals to complete while in the recovery program as well as once discharged into the community  Patient will complete WRAP Plan, Crisis Plan and 5 Life Domains  Patient will attend 50% of groups offered on the unit  Patient will complete a goal card weekly      Outcome: Not Progressing  Goal: Recognize dysfunctional thoughts, communicate reality-based thoughts at the time of discharge  Description  Interventions:  - Provide medication and psycho-education to assist patient in compliance and developing insight into his/her illness   Outcome: Not Progressing  Goal: Complete daily ADLs, including personal hygiene independently, as able  Description  Interventions:  - Observe, teach, and assist patient with ADLS  - Monitor and promote a balance of rest/activity, with adequate nutrition and elimination   Outcome: Not Progressing     Problem: Depression  Goal: Treatment Goal: Demonstrate behavioral control of depressive symptoms, verbalize feelings of improved mood/affect, and adopt new coping skills prior to discharge  Outcome: Not Progressing  Goal: Verbalize thoughts and feelings  Description  Interventions:  - Assess and re-assess patient's level of risk   - Engage patient in 1:1 interactions, daily, for a minimum of 15 minutes   - Encourage patient to express feelings, fears, frustrations, hopes   Outcome: Not Progressing  Goal: Refrain from isolation  Description  Interventions:  - Develop a trusting relationship   - Encourage socialization   Outcome: Not Progressing  Goal: Refrain from self-neglect  Outcome: Not Progressing     Problem: Alteration in Orientation  Goal: Interact with staff daily  Description  Interventions:  - Assess and re-assess patient's level of orientation  - Engage patient in 1 on 1 interactions, daily, for a minimum of 15 minutes   - Establish rapport/trust with patient   Outcome: Not Progressing  Goal: Cooperate with recommended testing/procedures  Description  Interventions:  - Determine need for ancillary testing  - Observe for mental status changes  - Implement falls/precaution protocol   Outcome: Not Progressing     Problem: DISCHARGE PLANNING  Goal: Discharge to home or other facility with appropriate resources  Description  INTERVENTIONS:  - Conduct assessment to determine patient/family and health care team treatment goals, and need for post-acute services based on payer coverage, community resources, and patient preferences, and barriers to discharge  - Address psychosocial, clinical, and financial barriers to discharge as identified in assessment in conjunction with the patient/family and health care team  - Assist the patient in reintegration back into the community by removing barriers which may hinder a successful discharge once deemed stable  - Arrange appropriate level of post-acute services according to patient's needs and preference and payer coverage in collaboration with the physician and health care team  - Communicate with and update the patient/family, physician, and health care team regarding progress on the discharge plan  - Arrange appropriate transportation to post-acute venues    Outcome: Not Progressing    Patient is alert, isolative to room and bed  Non compliant with program and hygiene  Denies depression and anxiety but behaviors contradict same  Poor appetite r/t somatic belief of eating and swallowing difficulties  Ensure was discontinued by medical and patient verbalized displeasure again today  Will not eat lunch because Ensure was discontinued  Lunches are based on her meal requests  Patient is compliant with meds  No c/o s/s pain, discomfort or distress  No socialization with staff or peers    Will continue to monitor progress in recovery program

## 2019-11-16 NOTE — PROGRESS NOTES
Progress Note - Behavioral Health   Nam Camacho 60 y o  female MRN: [id number]  [facility] Encounter: [id number]    Patient seen, chart reviewed, discussed with staff  Nursing staff notes patient remains somatic and noncompliant with treatment plan  She has been compliant with her medications though  Spent much of her time in bed and has poor self-care  On approach the patient continues with multiple physical complaints and concerns  Tells me that she has a hiatal hernia and needs to lie down  When discussing her medical issues further the patient states that she has "psychosomatic" symptoms  We discussed her anxiety and depression causing her physical symptoms and she showed slightly improved insight into this, however she continues to be isolative to self and spending much of her time in bed  Patient acknowledges that she has not been attending any groups  She was encouraged to attend at least one group today and she did state she would do that  She denies any suicidal or homicidal ideation  Residual somatic preoccupations delusions  Physically complains of nausea, abdominal pain from hiatal hernia, shortness of breath if she is out of bed      Behavior over the last 24 hours:  unchanged  Sleep:  Hypersomnia  Appetite: poor  Medication side effects: No  ROS: Multiple complaints  /80 (BP Location: Right arm) Comment: st 134/78 pulse 98  Pulse 100   Temp 97 6 °F (36 4 °C)   Resp 19   Ht 5' 4" (1 626 m)   Wt 66 7 kg (147 lb)   SpO2 98%   BMI 25 23 kg/m²     Medications:   Current Facility-Administered Medications   Medication Dose Route Frequency    acetaminophen (TYLENOL) tablet 650 mg  650 mg Oral Q6H PRN    albuterol (PROVENTIL HFA,VENTOLIN HFA) inhaler 2 puff  2 puff Inhalation Q4H PRN    aluminum-magnesium hydroxide-simethicone (MYLANTA) 200-200-20 mg/5 mL oral suspension 15 mL  15 mL Oral Q4H PRN    ammonium lactate (LAC-HYDRIN) 12 % lotion 1 application  1 application Topical BID PRN    benzonatate (TESSALON PERLES) capsule 100 mg  100 mg Oral TID PRN    benztropine (COGENTIN) injection 1 mg  1 mg Intramuscular Q8H PRN    cloZAPine (CLOZARIL) tablet 25 mg  25 mg Oral BID    cloZAPine (CLOZARIL) tablet 50 mg  50 mg Oral BID    EPINEPHrine PF (ADRENALIN) 1 mg/mL injection 0 15 mg  0 15 mg Intramuscular Once PRN    fluticasone-vilanterol (BREO ELLIPTA) 200-25 MCG/INH inhaler 1 puff  1 puff Inhalation Daily    ketotifen (ZADITOR) 0 025 % ophthalmic solution 1 drop  1 drop Right Eye BID PRN    levothyroxine tablet 125 mcg  125 mcg Oral Early Morning    magnesium hydroxide (MILK OF MAGNESIA) 400 mg/5 mL oral suspension 30 mL  30 mL Oral Daily PRN    montelukast (SINGULAIR) tablet 10 mg  10 mg Oral HS    OLANZapine (ZyPREXA) IM injection 5 mg  5 mg Intramuscular Q8H PRN    OLANZapine (ZyPREXA) tablet 5 mg  5 mg Oral Q8H PRN    ondansetron (ZOFRAN-ODT) dispersible tablet 4 mg  4 mg Oral Q6H PRN    pantoprazole (PROTONIX) EC tablet 40 mg  40 mg Oral Early Morning    polyethylene glycol (MIRALAX) packet 17 g  17 g Oral Daily PRN    polyvinyl alcohol (LIQUIFILM TEARS) 1 4 % ophthalmic solution 1 drop  1 drop Both Eyes Q3H PRN    sertraline (ZOLOFT) tablet 50 mg  50 mg Oral Daily    sucralfate (CARAFATE) oral suspension 1,000 mg  1,000 mg Oral BID    theophylline (JEF-24) 24 hr capsule 200 mg  200 mg Oral Daily    tiotropium (SPIRIVA) capsule for inhaler 18 mcg  18 mcg Inhalation Daily    traZODone (DESYREL) tablet 25 mg  25 mg Oral HS PRN       Labs:   No results displayed because visit has over 200 results            Mental Status Evaluation:  Appearance:  age appropriate and disheveled   Behavior:  guarded and Fair eye contact   Speech:  normal pitch and normal volume   Mood:  anxious   Affect:  mood-congruent   Thought Process:  tangential   Thought Content:  delusions  somatic   Perceptual Disturbances: Denies auditory or visual hallucinations   Risk Potential: none   Sensorium:  person, place and time/date   Cognition:  grossly intact   Consciousness:  alert and awake    Attention: attention span and concentration were age appropriate   Insight:  limited   Judgment: Poor   Gait/Station: Not observed, patient lying in bed   Motor Activity: no abnormal movements     Progress Toward Goals:  Minimal change    Assessment/Plan   Principal Problem:    Schizoaffective disorder, bipolar type (HCC)  Active Problems:    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[facility] [address] )    Acquired hypothyroidism    Gastroesophageal reflux disease without esophagitis    At risk for aspiration      Recommended Treatment:   Continue medications and treatment plan per Dr Panchito Staley 75 mg b i d  CBC with diff from yesterday within normal limits  Sertraline 50 mg daily    Continue with group therapy, milieu therapy and occupational therapy  Risks, benefits and possible side effects of Medications:   Risks, benefits, and possible side effects of medications explained to patient and patient verbalizes understanding  Counseling / Coordination of Care  Total floor / unit time spent today 25 minutes  Greater than 50% of total time was spent with the patient and / or family counseling and / or coordination of care   A description of the counseling / coordination of care:  Medication management, chart review, patient interview

## 2019-11-16 NOTE — PROGRESS NOTES
2140 Corey Marroquin did not attend any of the PM Group offerings  Did come out for HS snack having 2 applesauces, 12oz of milk  Refused the HS Singulair, but, took the HS Clozaril  Refused the Bevii; "Too full of milk"  Wearing now her QHS humidified nasal O2 @ 1L for bed

## 2019-11-16 NOTE — PLAN OF CARE
Problem: Alteration in Thoughts and Perception  Goal: Verbalize thoughts and feelings  Description  Interventions:  - Promote a nonjudgmental and trusting relationship with the patient through active listening and therapeutic communication  - Assess patient's level of functioning, behavior and potential for risk  - Engage patient in 1 on 1 interactions for a minimum of 15 minutes each session  - Encourage patient to express fears, feelings, frustrations, and discuss symptoms    - [place] patient to reality, help patient recognize reality-based thinking   - Administer medications as ordered and assess for potential side effects  - Provide the patient education related to the signs and symptoms of the illness and desired effects of prescribed medications  Outcome: Progressing     Problem: Alteration in Thoughts and Perception  Goal: Agree to be compliant with medication regime, as prescribed and report medication side effects  Description  Interventions:  - Offer appropriate PRN medication and supervise ingestion; conduct aims, as needed   Outcome: Not Progressing  Goal: Attend and participate in unit activities, including therapeutic, recreational, and educational groups  Description  Interventions:  - Provide therapeutic and educational activities daily, encourage attendance and participation, and document same in the medical record     CERTIFIED PEER SPECIALIST INTERVENTIONS:    Complete peer assessment with patient to assess their needs and identify their goals to complete while in the recovery program as well as once discharged into the community  Patient will complete WRAP Plan, Crisis Plan and 5 Life Domains  Patient will attend 50% of groups offered on the unit  Patient will complete a goal card weekly      Outcome: Not Progressing  Goal: Recognize dysfunctional thoughts, communicate reality-based thoughts at the time of discharge  Description  Interventions:  - Provide medication and psycho-education to assist patient in compliance and developing insight into his/her illness   Outcome: Not Progressing  Goal: Complete daily ADLs, including personal hygiene independently, as able  Description  Interventions:  - Observe, teach, and assist patient with ADLS  - Monitor and promote a balance of rest/activity, with adequate nutrition and elimination   Outcome: Not Progressing     Problem: Ineffective Coping  Goal: Demonstrates healthy coping skills  Outcome: Not Progressing     Problem: Depression  Goal: Refrain from isolation  Description  Interventions:  - Develop a trusting relationship   - Encourage socialization   Outcome: Not Progressing  Goal: Refrain from self-neglect  Outcome: Not Progressing     2015 Katty Bowles will not be swayed to shower  Multiple staff approached her w/plan to set it up, not leave her alone, assist w/shampooing, but, continues to refuse  Saying is too weak from lack of nutrition to tolerate such activity, putting it off until "tomorrow"  She refuses the Carafate insisting it interferes w/her breathing & didn't help her eat anyway  Presented to her that one dose taken is not a good trial of it's efficacy  Is not getting OOB except to come out for meal, supper medicine  Had only fluids for meal; juice, milk, coffee, sherbet  Said couldn't swallow the cottage cheese without choking, but, will try it again @ snack  Was encouraged to mash it finer w/spoon, drink fluids w/it to wash it down  She continues to voice conviction that she is a diabetic & wants blood chemistries done to prove this  Continues consumed w/somatic focus to the exclusion of productive activity

## 2019-11-17 NOTE — PLAN OF CARE
Problem: Alteration in Thoughts and Perception  Goal: Complete daily ADLs, including personal hygiene independently, as able  Description  Interventions:  - Observe, teach, and assist patient with ADLS  - Monitor and promote a balance of rest/activity, with adequate nutrition and elimination   Outcome: Progressing     Problem: Alteration in Orientation  Goal: Interact with staff daily  Description  Interventions:  - Assess and re-assess patient's level of orientation  - Engage patient in 1 on 1 interactions, daily, for a minimum of 15 minutes   - Establish rapport/trust with patient   Outcome: Progressing     Problem: Alteration in Thoughts and Perception  Goal: Agree to be compliant with medication regime, as prescribed and report medication side effects  Description  Interventions:  - Offer appropriate PRN medication and supervise ingestion; conduct aims, as needed   Outcome: Not Progressing  Goal: Attend and participate in unit activities, including therapeutic, recreational, and educational groups  Description  Interventions:  - Provide therapeutic and educational activities daily, encourage attendance and participation, and document same in the medical record     CERTIFIED PEER SPECIALIST INTERVENTIONS:    Complete peer assessment with patient to assess their needs and identify their goals to complete while in the recovery program as well as once discharged into the community  Patient will complete WRAP Plan, Crisis Plan and 5 Life Domains  Patient will attend 50% of groups offered on the unit  Patient will complete a goal card weekly  Outcome: Not Progressing     Problem: Ineffective Coping  Goal: Demonstrates healthy coping skills  Outcome: Not Progressing     Problem: Anxiety  Goal: Anxiety is at manageable level  Description  Interventions:  - Assess and monitor patient's anxiety level     - Monitor for signs and symptoms of anxiety both physical and emotional (heart palpitations, chest pain, shortness of breath, headaches, nausea, feeling jumpy, restlessness, irritable, apprehensive)  - Collaborate with interdisciplinary team and initiate plan and interventions as ordered  - [place] patient to unit/surroundings  - Explain treatment plan  - Encourage participation in care  - Encourage verbalization of concerns/fears  - Identify coping mechanisms  - Assist in developing anxiety-reducing skills  - Administer/offer alternative therapies  - Limit or eliminate stimulants  Outcome: Not Progressing     Problem: Nutrition/Hydration-ADULT  Goal: Nutrient/Hydration intake appropriate for improving, restoring or maintaining nutritional needs  Description  Monitor and assess patient's nutrition/hydration status for malnutrition  Collaborate with interdisciplinary team and initiate plan and interventions as ordered  Monitor patient's weight and dietary intake as ordered or per policy  Utilize nutrition screening tool and intervene as necessary  Determine patient's food preferences and provide high-protein, high-caloric foods as appropriate  INTERVENTIONS:  - Monitor oral intake, urinary output, labs, and treatment plans  - Assess nutrition and hydration status and recommend course of action  - Evaluate amount of meals eaten  - Assist patient with eating if necessary   - Allow adequate time for meals  - Recommend/ encourage appropriate diets, oral nutritional supplements, and vitamin/mineral supplements  - Order, calculate, and assess calorie counts as needed  - Recommend, monitor, and adjust tube feedings and TPN/PPN based on assessed needs  - Assess need for intravenous fluids  - Provide specific nutrition/hydration education as appropriate  - Include patient/family/caregiver in decisions related to nutrition  Outcome: Not Progressing     Pt isolative to room and self other than for meds and meals  Pt DID shower today with [facility] encouragement from staff  Stated she felt better after shower   Pt refused carafate and singulair, otherwise compliant with meds without prompting  Pt continues to be somatic  Only ate 50% of her dinner  Pt was seen by staff stealing [facility]), who has issues with vision, Glucerna off of her dinner tray  Pt was apologetic once confronted by staff  Pt did  not attend group  Was 92% on RA at HS, given O2 concentrator at 1L  No other issues, will continue to monitor

## 2019-11-17 NOTE — PLAN OF CARE
Problem: Alteration in Thoughts and Perception  Goal: Verbalize thoughts and feelings  Description  Interventions:  - Promote a nonjudgmental and trusting relationship with the patient through active listening and therapeutic communication  - Assess patient's level of functioning, behavior and potential for risk  - Engage patient in 1 on 1 interactions for a minimum of 15 minutes each session  - Encourage patient to express fears, feelings, frustrations, and discuss symptoms    - [place] patient to reality, help patient recognize reality-based thinking   - Administer medications as ordered and assess for potential side effects  - Provide the patient education related to the signs and symptoms of the illness and desired effects of prescribed medications  Outcome: Progressing  Goal: Agree to be compliant with medication regime, as prescribed and report medication side effects  Description  Interventions:  - Offer appropriate PRN medication and supervise ingestion; conduct aims, as needed   Outcome: Progressing     Problem: Alteration in Thoughts and Perception  Goal: Treatment Goal: Gain control of psychotic behaviors/thinking, reduce/eliminate presenting symptoms and demonstrate improved reality functioning upon discharge  Outcome: Not Progressing  Goal: Attend and participate in unit activities, including therapeutic, recreational, and educational groups  Description  Interventions:  - Provide therapeutic and educational activities daily, encourage attendance and participation, and document same in the medical record     CERTIFIED PEER SPECIALIST INTERVENTIONS:    Complete peer assessment with patient to assess their needs and identify their goals to complete while in the recovery program as well as once discharged into the community  Patient will complete WRAP Plan, Crisis Plan and 5 Life Domains  Patient will attend 50% of groups offered on the unit  Patient will complete a goal card weekly  Outcome: Not Progressing  Goal: Recognize dysfunctional thoughts, communicate reality-based thoughts at the time of discharge  Description  Interventions:  - Provide medication and psycho-education to assist patient in compliance and developing insight into his/her illness   Outcome: Not Progressing  Goal: Complete daily ADLs, including personal hygiene independently, as able  Description  Interventions:  - Observe, teach, and assist patient with ADLS  - Monitor and promote a balance of rest/activity, with adequate nutrition and elimination   Outcome: Not Progressing     Problem: Ineffective Coping  Goal: Identifies ineffective coping skills  Outcome: Not Progressing  Goal: Identifies healthy coping skills  Outcome: Not Progressing  Goal: Demonstrates healthy coping skills  Outcome: Not Progressing  Goal: Participates in unit activities  Description  Interventions:  - Provide therapeutic environment   - Provide required programming   - Redirect inappropriate behaviors   Outcome: Not Progressing  Goal: Patient/Family participate in treatment and DC plans  Description  Interventions:  - Provide therapeutic environment  Outcome: Not Progressing  Goal: Patient/Family verbalizes awareness of resources  Outcome: Not Progressing  Goal: Understands least restrictive measures  Description  Interventions:  - Utilize least restrictive behavior  Outcome: Not Progressing     Problem: Depression  Goal: Treatment Goal: Demonstrate behavioral control of depressive symptoms, verbalize feelings of improved mood/affect, and adopt new coping skills prior to discharge  Outcome: Not Progressing  Goal: Verbalize thoughts and feelings  Description  Interventions:  - Assess and re-assess patient's level of risk   - Engage patient in 1:1 interactions, daily, for a minimum of 15 minutes   - Encourage patient to express feelings, fears, frustrations, hopes   Outcome: Not Progressing  Goal: Refrain from isolation  Description  Interventions:  - Develop a trusting relationship   - Encourage socialization   Outcome: Not Progressing  Goal: Refrain from self-neglect  Outcome: Not Progressing     Problem: Anxiety  Goal: Anxiety is at manageable level  Description  Interventions:  - Assess and monitor patient's anxiety level  - Monitor for signs and symptoms of anxiety both physical and emotional (heart palpitations, chest pain, shortness of breath, headaches, nausea, feeling jumpy, restlessness, irritable, apprehensive)  - Collaborate with interdisciplinary team and initiate plan and interventions as ordered  - [place] patient to unit/surroundings  - Explain treatment plan  - Encourage participation in care  - Encourage verbalization of concerns/fears  - Identify coping mechanisms  - Assist in developing anxiety-reducing skills  - Administer/offer alternative therapies  - Limit or eliminate stimulants  Outcome: Not Progressing     Problem: Alteration in Orientation  Goal: Interact with staff daily  Description  Interventions:  - Assess and re-assess patient's level of orientation  - Engage patient in 1 on 1 interactions, daily, for a minimum of 15 minutes   - Establish rapport/trust with patient   Outcome: Not Progressing  Goal: Cooperate with recommended testing/procedures  Description  Interventions:  - Determine need for ancillary testing  - Observe for mental status changes  - Implement falls/precaution protocol   Outcome: Not Progressing     Problem: SAFETY ADULT  Goal: Patient will remain free of falls  Description  INTERVENTIONS:  - Assess patient frequently for physical needs  -  Identify cognitive and physical deficits and behaviors that affect risk of falls    -  [place] fall precautions as indicated by assessment   - Educate patient/family on patient safety including physical limitations  - Instruct patient to call for assistance with activity based on assessment  - Modify environment to reduce risk of injury  - Consider OT/PT consult to assist with strengthening/mobility  Outcome: Not Progressing     Problem: RESPIRATORY - ADULT  Goal: Achieves optimal ventilation and oxygenation  Description  INTERVENTIONS:  - Assess for changes in respiratory status  - Assess for changes in mentation and behavior  - Position to facilitate oxygenation and minimize respiratory effort  - Oxygen administration by appropriate delivery method based on oxygen saturation (per order) or ABGs  - Initiate smoking cessation education as indicated  - Encourage broncho-pulmonary hygiene including cough, deep breathe, Incentive Spirometry  - Assess the need for suctioning and aspirate as needed  - Assess and instruct to report SOB or any respiratory difficulty  - Respiratory Therapy support as indicated  Outcome: Not Progressing     Problem: DISCHARGE PLANNING  Goal: Discharge to home or other facility with appropriate resources  Description  INTERVENTIONS:  - Conduct assessment to determine patient/family and health care team treatment goals, and need for post-acute services based on payer coverage, community resources, and patient preferences, and barriers to discharge  - Address psychosocial, clinical, and financial barriers to discharge as identified in assessment in conjunction with the patient/family and health care team  - Assist the patient in reintegration back into the community by removing barriers which may hinder a successful discharge once deemed stable  - Arrange appropriate level of post-acute services according to patient's needs and preference and payer coverage in collaboration with the physician and health care team  - Communicate with and update the patient/family, physician, and health care team regarding progress on the discharge plan  - Arrange appropriate transportation to post-acute venues    Outcome: Not Progressing     Problem: SLEEP DISTURBANCE  Goal: Will exhibit normal sleeping pattern  Description  Interventions:  -  Assess the patients sleep pattern, noting recent changes  - Administer medication as ordered  - Decrease environmental stimuli, including noise, as appropriate during the night  - Encourage the patient to actively participate in unit groups and or exercise during the day to enhance ability to achieve adequate sleep at night  - Assess the patient, in the morning, encouraging a description of sleep experience  Outcome: Not Progressing     Problem: Nutrition/Hydration-ADULT  Goal: Nutrient/Hydration intake appropriate for improving, restoring or maintaining nutritional needs  Description  Monitor and assess patient's nutrition/hydration status for malnutrition  Collaborate with interdisciplinary team and initiate plan and interventions as ordered  Monitor patient's weight and dietary intake as ordered or per policy  Utilize nutrition screening tool and intervene as necessary  Determine patient's food preferences and provide high-protein, high-caloric foods as appropriate  INTERVENTIONS:  - Monitor oral intake, urinary output, labs, and treatment plans  - Assess nutrition and hydration status and recommend course of action  - Evaluate amount of meals eaten  - Assist patient with eating if necessary   - Allow adequate time for meals  - Recommend/ encourage appropriate diets, oral nutritional supplements, and vitamin/mineral supplements  - Order, calculate, and assess calorie counts as needed  - Recommend, monitor, and adjust tube feedings and TPN/PPN based on assessed needs  - Assess need for intravenous fluids  - Provide specific nutrition/hydration education as appropriate  - Include patient/family/caregiver in decisions related to nutrition  Outcome: Not Progressing    Patient is alert, isolative to room and bed  Non compliant with program and hygiene  Denies depression and anxiety but behaviors contradict same    Poor appetite r/t somatic belief of eating and swallowing difficulties  Ensure was discontinued by medical and patient verbalized displeasure again today  Ate 50% of breakfast and 25% of lunch  Lisha Barnett was found under patients body during room checks  This was prior to 7-3 snack time  Patient is compliant with meds  No c/o s/s pain, discomfort or distress  No socialization with staff or peers  She did not attend any groups   Will continue to monitor progress in recovery program

## 2019-11-17 NOTE — PLAN OF CARE
Problem: Alteration in Thoughts and Perception  Goal: Treatment Goal: Gain control of psychotic behaviors/thinking, reduce/eliminate presenting symptoms and demonstrate improved reality functioning upon discharge  Outcome: Progressing     Problem: Ineffective Coping  Goal: Demonstrates healthy coping skills  Outcome: Progressing     Problem: Risk for Self Injury/Neglect  Goal: Treatment Goal: Remain safe during length of stay, learn and adopt new coping skills, and be free of self-injurious ideation, impulses and acts at the time of discharge  Outcome: Progressing  Goal: Refrain from harming self  Description  Interventions:  - Monitor patient closely, per order  - Develop a trusting relationship  - Supervise medication ingestion, monitor effects and side effects   Outcome: Progressing     Problem: Depression  Goal: Treatment Goal: Demonstrate behavioral control of depressive symptoms, verbalize feelings of improved mood/affect, and adopt new coping skills prior to discharge  Outcome: Progressing     Problem: Anxiety  Goal: Anxiety is at manageable level  Description  Interventions:  - Assess and monitor patient's anxiety level  - Monitor for signs and symptoms of anxiety both physical and emotional (heart palpitations, chest pain, shortness of breath, headaches, nausea, feeling jumpy, restlessness, irritable, apprehensive)  - Collaborate with interdisciplinary team and initiate plan and interventions as ordered    - [place] patient to unit/surroundings  - Explain treatment plan  - Encourage participation in care  - Encourage verbalization of concerns/fears  - Identify coping mechanisms  - Assist in developing anxiety-reducing skills  - Administer/offer alternative therapies  - Limit or eliminate stimulants  Outcome: Progressing     Problem: PAIN - ADULT  Goal: Verbalizes/displays adequate comfort level or baseline comfort level  Description  Interventions:  - Encourage patient to monitor pain and request assistance  - Assess pain using appropriate pain scale  - Administer analgesics based on type and severity of pain and evaluate response  - Implement non-pharmacological measures as appropriate and evaluate response  - Consider cultural and social influences on pain and pain management  - Notify physician/advanced practitioner if interventions unsuccessful or patient reports new pain  Outcome: Progressing     Problem: SAFETY ADULT  Goal: Patient will remain free of falls  Description  INTERVENTIONS:  - Assess patient frequently for physical needs  -  Identify cognitive and physical deficits and behaviors that affect risk of falls    -  [place] fall precautions as indicated by assessment   - Educate patient/family on patient safety including physical limitations  - Instruct patient to call for assistance with activity based on assessment  - Modify environment to reduce risk of injury  - Consider OT/PT consult to assist with strengthening/mobility  Outcome: Progressing     Problem: RESPIRATORY - ADULT  Goal: Achieves optimal ventilation and oxygenation  Description  INTERVENTIONS:  - Assess for changes in respiratory status  - Assess for changes in mentation and behavior  - Position to facilitate oxygenation and minimize respiratory effort  - Oxygen administration by appropriate delivery method based on oxygen saturation (per order) or ABGs  - Initiate smoking cessation education as indicated  - Encourage broncho-pulmonary hygiene including cough, deep breathe, Incentive Spirometry  - Assess the need for suctioning and aspirate as needed  - Assess and instruct to report SOB or any respiratory difficulty  - Respiratory Therapy support as indicated  Outcome: Progressing     Problem: SLEEP DISTURBANCE  Goal: Will exhibit normal sleeping pattern  Description  Interventions:  -  Assess the patients sleep pattern, noting recent changes  - Administer medication as ordered  - Decrease environmental stimuli, including noise, as appropriate during the night  - Encourage the patient to actively participate in unit groups and or exercise during the day to enhance ability to achieve adequate sleep at night  - Assess the patient, in the morning, encouraging a description of sleep experience  Outcome: Progressing   Patient has been in bed asleep through the night  No concerns or apparent distress noted, on 1L O2 via NC  On routine checks, will continue to monitor

## 2019-11-17 NOTE — PROGRESS NOTES
Progress Note - Behavioral Health   Selina Simon 60 y o  female MRN: [id number]  [facility] Encounter: [id number]    Patient seen, chart reviewed, discussed with staff  Nursing staff notes no change in the past 24 hours  Patient has been isolative and seclusive to self and to room  She is compliant with medications but not compliant with attending any groups  Continues with somatic preoccupations  Patient was seen this morning lying in bed  Patient does state she is feeling better this morning reports decreased anxiety as well as decreased depression  Tells me that she did not go to any groups yesterday because she feels weak  Patient reminded that she will continue to feel weaker if she is not being physically active  She offers no specific complaints of pain today  Denies any suicidal or homicidal ideation  Denies any auditory or visual hallucinations      Behavior over the last 24 hours:  unchanged  Sleep: normal  Appetite: normal  Medication side effects: No  ROS: "weak"  /58 (BP Location: Left arm)   Pulse 71   Temp 98 1 °F (36 7 °C) (Temporal)   Resp 16   Ht 5' 4" (1 626 m)   Wt 66 1 kg (145 lb 12 8 oz)   SpO2 91%   BMI 25 03 kg/m²     Medications:   Current Facility-Administered Medications   Medication Dose Route Frequency    acetaminophen (TYLENOL) tablet 650 mg  650 mg Oral Q6H PRN    albuterol (PROVENTIL HFA,VENTOLIN HFA) inhaler 2 puff  2 puff Inhalation Q4H PRN    aluminum-magnesium hydroxide-simethicone (MYLANTA) 200-200-20 mg/5 mL oral suspension 15 mL  15 mL Oral Q4H PRN    ammonium lactate (LAC-HYDRIN) 12 % lotion 1 application  1 application Topical BID PRN    benzonatate (TESSALON PERLES) capsule 100 mg  100 mg Oral TID PRN    benztropine (COGENTIN) injection 1 mg  1 mg Intramuscular Q8H PRN    cloZAPine (CLOZARIL) tablet 25 mg  25 mg Oral BID    cloZAPine (CLOZARIL) tablet 50 mg  50 mg Oral BID    EPINEPHrine PF (ADRENALIN) 1 mg/mL injection 0 15 mg  0 15 mg Intramuscular Once PRN    fluticasone-vilanterol (BREO ELLIPTA) 200-25 MCG/INH inhaler 1 puff  1 puff Inhalation Daily    ketotifen (ZADITOR) 0 025 % ophthalmic solution 1 drop  1 drop Right Eye BID PRN    levothyroxine tablet 125 mcg  125 mcg Oral Early Morning    magnesium hydroxide (MILK OF MAGNESIA) 400 mg/5 mL oral suspension 30 mL  30 mL Oral Daily PRN    montelukast (SINGULAIR) tablet 10 mg  10 mg Oral HS    OLANZapine (ZyPREXA) IM injection 5 mg  5 mg Intramuscular Q8H PRN    OLANZapine (ZyPREXA) tablet 5 mg  5 mg Oral Q8H PRN    ondansetron (ZOFRAN-ODT) dispersible tablet 4 mg  4 mg Oral Q6H PRN    pantoprazole (PROTONIX) EC tablet 40 mg  40 mg Oral Early Morning    polyethylene glycol (MIRALAX) packet 17 g  17 g Oral Daily PRN    polyvinyl alcohol (LIQUIFILM TEARS) 1 4 % ophthalmic solution 1 drop  1 drop Both Eyes Q3H PRN    sertraline (ZOLOFT) tablet 50 mg  50 mg Oral Daily    sucralfate (CARAFATE) oral suspension 1,000 mg  1,000 mg Oral BID    theophylline (JEF-24) 24 hr capsule 200 mg  200 mg Oral Daily    tiotropium (SPIRIVA) capsule for inhaler 18 mcg  18 mcg Inhalation Daily    traZODone (DESYREL) tablet 25 mg  25 mg Oral HS PRN       Labs:   No results displayed because visit has over 200 results            Mental Status Evaluation:  Appearance:  age appropriate and casually dressed   Behavior:  Cooperative, good eye contact   Speech:  normal pitch and normal volume   Mood:  Less anxious, less depressed   Affect:  mood-congruent   Thought Process:  circumstantial   Thought Content:  delusions  somatic   Perceptual Disturbances: None   Risk Potential: none   Sensorium:  person, place and time/date   Cognition:  grossly intact   Consciousness:  alert and awake    Attention: attention span and concentration were age appropriate   Insight:  limited   Judgment: Poor   Gait/Station: Not observed, patient lying in bed   Motor Activity: no abnormal movements     Progress Toward Goals: Minimal change    Assessment/Plan   Principal Problem:    Schizoaffective disorder, bipolar type (HCC)  Active Problems:    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[address] )    Acquired hypothyroidism    Gastroesophageal reflux disease without esophagitis    At risk for aspiration      Recommended Treatment:  Continue with medications and treatment plan per Dr Rosemary Valles 75 mg b i d  Sertraline 50 mg daily  Continue to encourage in milieu participation  Patient again told me she would attend one group meeting today     Continue with group therapy, milieu therapy and occupational therapy  Risks, benefits and possible side effects of Medications:   Risks, benefits, and possible side effects of medications explained to patient and patient verbalizes understanding  Counseling / Coordination of Care  Total floor / unit time spent today 25 minutes  Greater than 50% of total time was spent with the patient and / or family counseling and / or coordination of care  A description of the counseling / coordination of care:  Medication management, chart review, patient interview

## 2019-11-17 NOTE — NURSING NOTE
Patient has presented to the nurses station twice since awaking at [phone], requesting blood sugar check  Responded "because I get low blood sugars" on assessment, no symptoms stated  Easily redirectable without agitation

## 2019-11-18 NOTE — PROGRESS NOTES
11/18/19 0900   Team Meeting   Meeting Type Daily Rounds   Team Members Present   Team Members Present Physician;Nurse;; Other (Discipline and Name)   Physician Team Member Dr Carolee Donald Team Member Tal Bingham RN   Care Management Team Member Brenda Ahn   Other (Discipline and Name) Anabela Ji LCSW     Patient continues to be preoccupied with somatic complaints, focused on her blood sugars  Minimal eating  Slept

## 2019-11-18 NOTE — PLAN OF CARE
Problem: Alteration in Thoughts and Perception  Goal: Verbalize thoughts and feelings  Description  Interventions:  - Promote a nonjudgmental and trusting relationship with the patient through active listening and therapeutic communication  - Assess patient's level of functioning, behavior and potential for risk  - Engage patient in 1 on 1 interactions for a minimum of 15 minutes each session  - Encourage patient to express fears, feelings, frustrations, and discuss symptoms    - [place] patient to reality, help patient recognize reality-based thinking   - Administer medications as ordered and assess for potential side effects  - Provide the patient education related to the signs and symptoms of the illness and desired effects of prescribed medications  Outcome: Not Progressing  Goal: Attend and participate in unit activities, including therapeutic, recreational, and educational groups  Description  Interventions:  - Provide therapeutic and educational activities daily, encourage attendance and participation, and document same in the medical record     CERTIFIED PEER SPECIALIST INTERVENTIONS:    Complete peer assessment with patient to assess their needs and identify their goals to complete while in the recovery program as well as once discharged into the community  Patient will complete WRAP Plan, Crisis Plan and 5 Life Domains  Patient will attend 50% of groups offered on the unit  Patient will complete a goal card weekly  Outcome: Not Progressing  Goal: Complete daily ADLs, including personal hygiene independently, as able  Description  Interventions:  - Observe, teach, and assist patient with ADLS  - Monitor and promote a balance of rest/activity, with adequate nutrition and elimination   Outcome: Not Progressing     Problem: Ineffective Coping  Goal: Demonstrates healthy coping skills  Outcome: Not Progressing    Individual has been keeping to self, isolative to room   She was visible at meal times and medication pass  She did not actively participate in programming, no groups were attended  She has not had any episodes of " distress" this shift, no somatic complaints  She had slightly improves appetite, ate 50% of both meals  Compliant with meds  Availability of staff made known  Will continue to monitor

## 2019-11-18 NOTE — PLAN OF CARE
Problem: Alteration in Thoughts and Perception  Goal: Agree to be compliant with medication regime, as prescribed and report medication side effects  Description  Interventions:  - Offer appropriate PRN medication and supervise ingestion; conduct aims, as needed   Outcome: Progressing  Goal: Complete daily ADLs, including personal hygiene independently, as able  Description  Interventions:  - Observe, teach, and assist patient with ADLS  - Monitor and promote a balance of rest/activity, with adequate nutrition and elimination   Outcome: Progressing     Problem: Risk for Self Injury/Neglect  Goal: Verbalize thoughts and feelings  Description  Interventions:  - Assess and re-assess patient's lethality and potential for self-injury  - Engage patient in 1:1 interactions, daily, for a minimum of 15 minutes  - Encourage patient to express feelings, fears, frustrations, hopes  - Establish rapport/trust with patient   Outcome: Progressing  Goal: Refrain from harming self  Description  Interventions:  - Monitor patient closely, per order  - Develop a trusting relationship  - Supervise medication ingestion, monitor effects and side effects   Outcome: Progressing     Problem: Alteration in Orientation  Goal: Interact with staff daily  Description  Interventions:  - Assess and re-assess patient's level of orientation  - Engage patient in 1 on 1 interactions, daily, for a minimum of 15 minutes   - Establish rapport/trust with patient   Outcome: Progressing     Problem: SAFETY ADULT  Goal: Patient will remain free of falls  Description  INTERVENTIONS:  - Assess patient frequently for physical needs  -  Identify cognitive and physical deficits and behaviors that affect risk of falls    -  [place] fall precautions as indicated by assessment   - Educate patient/family on patient safety including physical limitations  - Instruct patient to call for assistance with activity based on assessment  - Modify environment to reduce risk of injury  - Consider OT/PT consult to assist with strengthening/mobility  Outcome: Progressing     Problem: Depression  Goal: Refrain from isolation  Description  Interventions:  - Develop a trusting relationship   - Encourage socialization   Outcome: Jannette Aguilar has been in her room most of the shift  She is either sitting on her bed or napping at times  Disheveled appearance  Somatic when it comes to her blood sugar  She has been asking constantly for staff to check her sugar  "I am not eating much, so can someone check my sugar?" No complaint of dizziness or other issues associated with low glucose level  Took pills at supper time, but refused the Carafate  "The doctor did not tell me I was going to be taking it " Tried to explain the benefit of taking the medicine, but she still refused  Did not eat her meal  VSS  No complaint of shortness of breath  Offered incentive spirometer to her  She did not use it  Encouraged to be out and about on the unit  Did not attend evening group  Took HS medication with prompting  Came out in the hallway and sat by the phone with her snack  She was reminded by staff that she has to eat in the dining room  "I can't eat with the noise from the TV  It is making me nervous  It is the energy from it "  TV had to turned off  Oxygen saturation  90% on room air prior to oxygen 1 liter nasal cannula applied  Continue to monitor  Precautions maintained

## 2019-11-18 NOTE — PLAN OF CARE
Problem: Alteration in Thoughts and Perception  Goal: Attend and participate in unit activities, including therapeutic, recreational, and educational groups  Description  Interventions:  - Provide therapeutic and educational activities daily, encourage attendance and participation, and document same in the medical record     CERTIFIED PEER SPECIALIST INTERVENTIONS:    Complete peer assessment with patient to assess their needs and identify their goals to complete while in the recovery program as well as once discharged into the community  Patient will complete WRAP Plan, Crisis Plan and 5 Life Domains  Patient will attend 50% of groups offered on the unit  Patient will complete a goal card weekly  Outcome: Not Progressing  Patient not attending or showing interest in any therapeutic activities or educational groups  Patient lacks insight into the severity of her mental illness and continues to focus on somatic complaints  Patient given materials to work on independently and writer encourages groups without success

## 2019-11-18 NOTE — PROGRESS NOTES
Natalie Lopes awaken at 1863, walk to the dinning room, pass the nurses station and sat  After a while she retrieve to her room lay down and yelling out this writer name, instead of coming to the nurse's station  Natalie Lopes remains psychosomatic and only focus on her fingerstick  She wanted her fingerstick to be done because she claim that she has not eating in a week and she feel like she going to die  Redirected and reeducated about the important of eating properly and following a nutritional diet  She stated that "Everyone here including the doctor wants me to die"  She returned back to her room  At 0600 she took her 2 morning pills with only small sips of water and did not want to finish the 4 oz cup that was given to her  Reminded her the importance of hydration  She refuses her carafate this morning and is animate about not taking it because she stated the doctor never order that medication for her  Carafate wasted

## 2019-11-18 NOTE — ASSESSMENT & PLAN NOTE
Psychiatry Progress Note  Patient has been psychosomatic demanding to get her blood sugar checked even though she is asymptomatic and has been accusing staff of messing with her treatment and causing her to die whenever she is asked to go and take a shower  She thought that the TV is giving her energy so it to had to be turned off before she could finish her meal in the dining room  She does eat her meals but then goes back to bed and lays down wearing the same clothing and refusing to change at all or get up and attend any groups  She still resistive to taking any showers even though she knows that she has to take showers every other day and tends to lay back on her bed  She continues to have other psychosomatic symptoms about not being able to breathe or not able to swallow and dying from some terrible illnesses like cancer which have not changed at all and she has been refusing the Carafate that was ordered by medical She still accuses some  staff of playing with her medications like her  inhalers states he does not trust some of the nurses on the unit   He appears to have fixed delusions about may be a micro chip implanted on her right forearm were she has a lipoma   No current suicidal homicidal thoughts intent or plans reported  No overt hallucinations or other delusions reported   No signs or sxs of agranulocytosis or myocarditis or endocarditis and she is moving her bowels so far with no issues     She was again reminded to work with the program and work towards going back to the [facility] and start attending groups at least 25% of the time we can look into discharging her back to the personal care home and she verbally notes that she will but does not despite having a behavior plan as well   Current medications:    Current Facility-Administered Medications:     acetaminophen (TYLENOL) tablet 650 mg, 650 mg, Oral, Q6H PRN, Shaggy Rangel MD    albuterol (PROVENTIL HFA,VENTOLIN HFA) inhaler 2 puff, 2 puff, Inhalation, Q4H PRN, Jill Gayle MD, 2 puff at 10/11/19 0424    aluminum-magnesium hydroxide-simethicone (MYLANTA) 200-200-20 mg/5 mL oral suspension 15 mL, 15 mL, Oral, Q4H PRN, Jill Gayle MD    ammonium lactate (LAC-HYDRIN) 12 % lotion 1 application, 1 application, Topical, BID PRN, Jill Gayle MD    benzonatate (TESSALON PERLES) capsule 100 mg, 100 mg, Oral, TID PRN, Jill Gayle MD    benztropine (COGENTIN) injection 1 mg, 1 mg, Intramuscular, Q8H PRN, Jill Gayle MD    cloZAPine (CLOZARIL) tablet 25 mg, 25 mg, Oral, BID, Jill Gayle MD, 25 mg at 11/17/19 2053    cloZAPine (CLOZARIL) tablet 50 mg, 50 mg, Oral, BID, Jill Gayle MD, 50 mg at 11/17/19 2054    EPINEPHrine PF (ADRENALIN) 1 mg/mL injection 0 15 mg, 0 15 mg, Intramuscular, Once PRN, Jill Gayle MD    fluticasone-vilanterol (BREO ELLIPTA) 200-25 MCG/INH inhaler 1 puff, 1 puff, Inhalation, Daily, Jill Gayle MD, 1 puff at 11/17/19 0851    ketotifen (ZADITOR) 0 025 % ophthalmic solution 1 drop, 1 drop, Right Eye, BID PRN, Jill Gayle MD    levothyroxine tablet 125 mcg, 125 mcg, Oral, Early Morning, Jill Gayle MD, 125 mcg at 11/18/19 0602    magnesium hydroxide (MILK OF MAGNESIA) 400 mg/5 mL oral suspension 30 mL, 30 mL, Oral, Daily PRN, Jill Gayle MD    montelukast (SINGULAIR) tablet 10 mg, 10 mg, Oral, HS, Jill Gayle MD, 10 mg at 11/12/19 2141    OLANZapine (ZyPREXA) IM injection 5 mg, 5 mg, Intramuscular, Q8H PRN, Jill Gayle MD    OLANZapine (ZyPREXA) tablet 5 mg, 5 mg, Oral, Q8H PRN, Jill Gayle MD    ondansetron (ZOFRAN-ODT) dispersible tablet 4 mg, 4 mg, Oral, Q6H PRN, Jill Gayle MD, 4 mg at 11/09/19 1223    pantoprazole (PROTONIX) EC tablet 40 mg, 40 mg, Oral, Early Morning, Jill Gayle MD, 40 mg at 11/18/19 0602    polyethylene glycol (MIRALAX) packet 17 g, 17 g, Oral, Daily PRN, Jill Gayle MD    polyvinyl alcohol (LIQUIFILM TEARS) 1 4 % ophthalmic solution 1 drop, 1 drop, Both Eyes, Q3H PRN, Carissa Willis MD    sertraline (ZOLOFT) tablet 50 mg, 50 mg, Oral, Daily, Carissa Willis MD, 50 mg at 11/17/19 0851    sucralfate (CARAFATE) oral suspension 1,000 mg, 1,000 mg, Oral, BID, Cat Torres MD, 1,000 mg at 11/15/19 0600    theophylline (JEF-24) 24 hr capsule 200 mg, 200 mg, Oral, Daily, Carissa Willis MD, 200 mg at 11/17/19 0851    tiotropium (SPIRIVA) capsule for inhaler 18 mcg, 18 mcg, Inhalation, Daily, Carissa Willis MD, 18 mcg at 11/17/19 0850    traZODone (DESYREL) tablet 25 mg, 25 mg, Oral, HS PRN, Carissa Willis MD  Justification if on more than two antipsychotics:  Only on his clozapine  Side effects if any:  None    Risks , benefits, side effects and precautions of medications discussed with patient and reminded patient to let us know any problems with medications     Objective:     Vital Signs:  Vitals:    11/17/19 0732 11/17/19 1546 11/17/19 1900 11/17/19 2135   BP: 100/58 125/73 92/61    BP Location: Left arm Left arm Left arm    Pulse: 71 93 72    Resp:  19 15    Temp:  (!) 97 °F (36 1 °C) (!) 97 2 °F (36 2 °C)    TempSrc:  Temporal Temporal    SpO2:  94% 93% 90%   Weight:       Height:         Appearance:  age appropriate, casually dressed and overweight older than stated age, uncombed long grey hair, poorly groomed and unkempt disheveled laying in bed refusing to get up   Behavior:  evasive and guarded and suspicious but with good eye contact and preoccupied psychosomatic and argumentative demanding and difficult to redirect   Speech:  normal pitch and normal volume but circumstantial and tangential with stilted speech   Mood:  anxious and dysthymic   Affect:  mood-congruent, elated and entitled off and   Thought Process:  goal directed and illogical slightly pressured and tangential and talks as if in a court of law   Thought Content:  delusions  grandiose and somatic type about not being able to breathe despite being on nasal oxygen and paranoid about people out to harm her and Atrovent inhaler tainted with peanut oil and does not trust the staff on the unit  No current suicidal homicidal thoughts intent or plans reported  No phobias obsessions or compulsions reported  Somatic delusions about having cancer or terrible illnesses like cancer or having a device implanted on her hand off and on , her having low sugar   She believes that people are withholding information from her about her having cancer or some kind of terrible illness and she believes she is dying from that illness   Perceptual Disturbances: None and does not appear responding to internal stimuli   Risk Potential: Tendency to not care for herself if she goes off treatment   Sensorium:  person, place, time/date, situation, day of week, month of year and time   Cognition:  grossly intact   Consciousness:  alert and awake    Attention: Intact concentration and attention span   Intellect: Considered to be at least of average intelligence   Insight:  limited and in denial of her illness   Judgment: poor      Motor Activity: no abnormal movements         Recent Labs:  Results Reviewed     None          I/O Past 24 hours:  No intake/output data recorded  No intake/output data recorded          Assessment / Plan:     [facility]ive [facility]      Reason for continued inpatient care:  Because of underlying paranoia and refusal to go back to the personal care home and inability to care for herself on her own  Acceptance by patient:  Accepting  Filomena Powell in recovery:  About living in another personal care home once she feels better  Understanding of medications :  Has some understanding  Involved in reintegration process:  Adjusting to the unit  Trusting in relatoinship with psychiatrist:  Trusting    Recommended Treatment:    Medication changes:  1) no changes today   Non-pharmacological treatments  1) continue with  individual therapy group therapy, milieu therapy and occupational therapy and milieu therapy involving multidisciplinary team approach with psychotherapist, case management, nursing, peer support services, art therapist etc using recovery principles and psycho-education about accepting illness and need for treatment   3) encourage to attend to ADLs like taking showers and wearing clean clothes   4) Encourage to attend groups   Safety  1) Safety/communication plan established targeting dynamic risk factors above  Discharge Plan most likely back to the a:  Personal care boarding home with act services once her delusions are managed and mood becomes more stabilized and she becomes more receptive to treatment compliance    Counseling / Coordination of Care    Total floor / unit time spent today 15 minutes  Greater than 50% of total time was spent with the patient and / or family counseling and / or coordination of care  A description of the counseling / coordination of care  Patient's Rights, confidentiality and exceptions to confidentiality, use of automated medical record, [facility] record, and consent to treatment reviewed      Sindy Day MD

## 2019-11-18 NOTE — PLAN OF CARE
Problem: PAIN - ADULT  Goal: Verbalizes/displays adequate comfort level or baseline comfort level  Description  Interventions:  - Encourage patient to monitor pain and request assistance  - Assess pain using appropriate pain scale  - Administer analgesics based on type and severity of pain and evaluate response  - Implement non-pharmacological measures as appropriate and evaluate response  - Consider cultural and social influences on pain and pain management  - Notify physician/advanced practitioner if interventions unsuccessful or patient reports new pain  Outcome: Progressing     Problem: SAFETY ADULT  Goal: Patient will remain free of falls  Description  INTERVENTIONS:  - Assess patient frequently for physical needs  -  Identify cognitive and physical deficits and behaviors that affect risk of falls    -  Buena fall precautions as indicated by assessment   - Educate patient/family on patient safety including physical limitations  - Instruct patient to call for assistance with activity based on assessment  - Modify environment to reduce risk of injury  - Consider OT/PT consult to assist with strengthening/mobility  Outcome: Progressing     Problem: RESPIRATORY - ADULT  Goal: Achieves optimal ventilation and oxygenation  Description  INTERVENTIONS:  - Assess for changes in respiratory status  - Assess for changes in mentation and behavior  - Position to facilitate oxygenation and minimize respiratory effort  - Oxygen administration by appropriate delivery method based on oxygen saturation (per order) or ABGs  - Initiate smoking cessation education as indicated  - Encourage broncho-pulmonary hygiene including cough, deep breathe, Incentive Spirometry  - Assess the need for suctioning and aspirate as needed  - Assess and instruct to report SOB or any respiratory difficulty  - Respiratory Therapy support as indicated  Outcome: Progressing     Problem: SLEEP DISTURBANCE  Goal: Will exhibit normal sleeping pattern  Description  Interventions:  -  Assess the patients sleep pattern, noting recent changes  - Administer medication as ordered  - Decrease environmental stimuli, including noise, as appropriate during the night  - Encourage the patient to actively participate in unit groups and or exercise during the day to enhance ability to achieve adequate sleep at night  - Assess the patient, in the morning, encouraging a description of sleep experience  Outcome: Cash Mcfarlane maintained on ongoing SAFE precaution without incident on this shift   Laying in bed with her eyes closed, breath even and unlabored   No indication of pain or discomfort noted   No indication of respiratory distress noted    O2 @1L/m with humidification via NC  Lenell Line continue to monitor behavior and sleep pattern

## 2019-11-18 NOTE — PROGRESS NOTES
Progress Note - Lizbeth Jhaveri 1955, 60 y o  female MRN: [id number]    [facility] Encounter: [id number]    Primary Care Provider: Alonso Carmona PA-C   Date and time admitted to hospital: 7/23/2019  5:30 PM        * Schizoaffective disorder, bipolar type [facility])  Assessment & Plan  Psychiatry Progress Note  Patient has been psychosomatic demanding to get her blood sugar checked even though she is asymptomatic and has been accusing staff of messing with her treatment and causing her to die whenever she is asked to go and take a shower  She thought that the TV is giving her energy so it to had to be turned off before she could finish her meal in the dining room  She does eat her meals but then goes back to bed and lays down wearing the same clothing and refusing to change at all or get up and attend any groups  She still resistive to taking any showers even though she knows that she has to take showers every other day and tends to lay back on her bed  She continues to have other psychosomatic symptoms about not being able to breathe or not able to swallow and dying from some terrible illnesses like cancer which have not changed at all and she has been refusing the Carafate that was ordered by medical She still accuses some  staff of playing with her medications like her  inhalers states he does not trust some of the nurses on the unit   He appears to have fixed delusions about may be a micro chip implanted on her right forearm were she has a lipoma   No current suicidal homicidal thoughts intent or plans reported  No overt hallucinations or other delusions reported   No signs or sxs of agranulocytosis or myocarditis or endocarditis and she is moving her bowels so far with no issues     She was again reminded to work with the program and work towards going back to the [facility] and start attending groups at least 25% of the time we can look into discharging her back to the personal care home and she verbally notes that she will but does not despite having a behavior plan as well   Current medications:    Current Facility-Administered Medications:     acetaminophen (TYLENOL) tablet 650 mg, 650 mg, Oral, Q6H PRN, Jeffrey Gallegos MD    albuterol (PROVENTIL HFA,VENTOLIN HFA) inhaler 2 puff, 2 puff, Inhalation, Q4H PRN, Jeffrey Gallegos MD, 2 puff at 10/11/19 0424    aluminum-magnesium hydroxide-simethicone (MYLANTA) 200-200-20 mg/5 mL oral suspension 15 mL, 15 mL, Oral, Q4H PRN, Jeffrey Gallegos MD    ammonium lactate (LAC-HYDRIN) 12 % lotion 1 application, 1 application, Topical, BID PRN, Jeffrey Gallegos MD    benzonatate (TESSALON PERLES) capsule 100 mg, 100 mg, Oral, TID PRN, Jeffrey Gallegos MD    benztropine (COGENTIN) injection 1 mg, 1 mg, Intramuscular, Q8H PRN, Jeffrey Gallegos MD    cloZAPine (CLOZARIL) tablet 25 mg, 25 mg, Oral, BID, Jeffrey Gallegos MD, 25 mg at 11/17/19 2053    cloZAPine (CLOZARIL) tablet 50 mg, 50 mg, Oral, BID, Jeffrey Gallegos MD, 50 mg at 11/17/19 2054    EPINEPHrine PF (ADRENALIN) 1 mg/mL injection 0 15 mg, 0 15 mg, Intramuscular, Once PRN, Jeffrey Gallegos MD    fluticasone-vilanterol (BREO ELLIPTA) 200-25 MCG/INH inhaler 1 puff, 1 puff, Inhalation, Daily, Jeffrey Gallegos MD, 1 puff at 11/17/19 0851    ketotifen (ZADITOR) 0 025 % ophthalmic solution 1 drop, 1 drop, Right Eye, BID PRN, Jeffrey Gallegos MD    levothyroxine tablet 125 mcg, 125 mcg, Oral, Early Morning, Jeffrey Gallegos MD, 125 mcg at 11/18/19 0602    magnesium hydroxide (MILK OF MAGNESIA) 400 mg/5 mL oral suspension 30 mL, 30 mL, Oral, Daily PRN, Jeffrey Gallegos MD    montelukast (SINGULAIR) tablet 10 mg, 10 mg, Oral, HS, Jeffrey Gallegos MD, 10 mg at 11/12/19 2141    OLANZapine (ZyPREXA) IM injection 5 mg, 5 mg, Intramuscular, Q8H PRN, Jeffrey Gallegos MD    OLANZapine (ZyPREXA) tablet 5 mg, 5 mg, Oral, Q8H PRN, Jeffrey Gallegos MD    ondansetron (ZOFRAN-ODT) dispersible tablet 4 mg, 4 mg, Oral, Q6H PRN, Jeffrey Gallegos MD, 4 mg at 11/09/19 1223    pantoprazole (PROTONIX) EC tablet 40 mg, 40 mg, Oral, Early Morning, Isidoro Gonzalez MD, 40 mg at 11/18/19 0602    polyethylene glycol (MIRALAX) packet 17 g, 17 g, Oral, Daily PRN, Isidoro Gonzalez MD    polyvinyl alcohol (LIQUIFILM TEARS) 1 4 % ophthalmic solution 1 drop, 1 drop, Both Eyes, Q3H PRN, Isidoro Gonzalez MD    sertraline (ZOLOFT) tablet 50 mg, 50 mg, Oral, Daily, Isidoro Gonzalez MD, 50 mg at 11/17/19 0851    sucralfate (CARAFATE) oral suspension 1,000 mg, 1,000 mg, Oral, BID, Cat Torres MD, 1,000 mg at 11/15/19 0600    theophylline (JEF-24) 24 hr capsule 200 mg, 200 mg, Oral, Daily, Isidoro Gonzalez MD, 200 mg at 11/17/19 0851    tiotropium (SPIRIVA) capsule for inhaler 18 mcg, 18 mcg, Inhalation, Daily, Isidoro Gonzalez MD, 18 mcg at 11/17/19 0850    traZODone (DESYREL) tablet 25 mg, 25 mg, Oral, HS PRN, Isidoro Gonzalez MD  Justification if on more than two antipsychotics:  Only on his clozapine  Side effects if any:  None    Risks , benefits, side effects and precautions of medications discussed with patient and reminded patient to let us know any problems with medications     Objective:     Vital Signs:  Vitals:    11/17/19 0732 11/17/19 1546 11/17/19 1900 11/17/19 2135   BP: 100/58 125/73 92/61    BP Location: Left arm Left arm Left arm    Pulse: 71 93 72    Resp:  19 15    Temp:  (!) 97 °F (36 1 °C) (!) 97 2 °F (36 2 °C)    TempSrc:  Temporal Temporal    SpO2:  94% 93% 90%   Weight:       Height:         Appearance:  age appropriate, casually dressed and overweight older than stated age, uncombed long grey hair, poorly groomed and unkempt disheveled    Behavior:  evasive and guarded and suspicious but with good eye contact and preoccupied psychosomatic and argumentative demanding    Speech:  normal pitch and normal volume but circumstantial and tangential with stilted speech   Mood:  anxious and dysthymic   Affect:  mood-congruent, elated and entitled off and demanding   Thought Process: goal directed and illogical slightly pressured and tangential and talks as if in a court of law   Thought Content:  delusions of somatic type about not being able to breathe despite being on nasal oxygen and paranoid about people out to harm her and Atrovent inhaler tainted with peanut oil and does not trust the staff on the unit  She accuses some staff of trying to kill her by forcing her into shower  No current suicidal homicidal thoughts intent or plans reported  No phobias obsessions or compulsions reported  Somatic delusions about having cancer or terrible illnesses like cancer or having a device implanted on her hand off and on , her having low sugar   She believes that people are withholding information from her about her having cancer or some kind of terrible illness    Perceptual Disturbances: None and does not appear responding to internal stimuli   Risk Potential: Tendency to not care for herself if she goes off treatment   Sensorium:  person, place, time/date, situation, day of week, month of year and time   Cognition:  grossly intact   Consciousness:  alert and awake    Attention: Intact concentration and attention span   Intellect: Considered to be at least of average intelligence   Insight:  limited and in denial of her illness   Judgment: poor      Motor Activity: no abnormal movements         Recent Labs:  Results Reviewed     None          I/O Past 24 hours:  No intake/output data recorded  No intake/output data recorded          Assessment / Plan:     [facility]ffective disorder, bipolar [facility]      Reason for continued inpatient care:  Because of underlying paranoia and refusal to go back to the personal care home and inability to care for herself on her own  Acceptance by patient:  Accepting  Kitty Sicard in recovery:  About living in another personal care home once she feels better  Understanding of medications :  Has some understanding  Involved in reintegration process:  Adjusting to the unit  Trusting in relatoinship with psychiatrist:  Trusting    Recommended Treatment:    Medication changes:  1) no changes today   Non-pharmacological treatments  1) continue with  individual therapy group therapy, milieu therapy and occupational therapy and milieu therapy involving multidisciplinary team approach with psychotherapist, case management, nursing, peer support services, art therapist etc using recovery principles and psycho-education about accepting illness and need for treatment   3) encourage to attend to ADLs like taking showers and wearing clean clothes   4) Encourage to attend groups   Safety  1) Safety/communication plan established targeting dynamic risk factors above  Discharge Plan most likely back to the a:  Personal care boarding home with [facility] services once her delusions are managed and mood becomes more stabilized and she becomes more receptive to treatment compliance    Counseling / Coordination of Care    Total floor / unit time spent today 15 minutes  Greater than 50% of total time was spent with the patient and / or family counseling and / or coordination of care  A description of the counseling / coordination of care  Patient's Rights, confidentiality and exceptions to confidentiality, use of automated medical record, [address] [place] [address] staff access to medical record, and consent to treatment reviewed      Meldon Curling, MD

## 2019-11-18 NOTE — PROGRESS NOTES
Patient declined      11/18/19 1100   Activity/Group Checklist   Group   (IMR/Personal Responsibility )   Attendance Did not attend   Attendance Duration (min) 46-60   Affect/Mood IRMA

## 2019-11-19 NOTE — PROGRESS NOTES
11/19/19 0800   Team Meeting   Meeting Type Daily Rounds   Team Members Present   Team Members Present Physician;Nurse; Other (Discipline and Name)   Physician Team Member Dr Sudhir Sheffield Team Member Dmitriy Blas, LISA   Other (Discipline and Name) Kelby Payment, LCSW     Patient did not attend groups  She is isolative and somatic  Was in her room most of the day except 3-11 shift  Patient had EKG completed  Slept

## 2019-11-19 NOTE — PROGRESS NOTES
Sleeping at this time, no s/s of distress noted  O2 on at 1L   Safe precautions in place and maintained at this time, monitoring continues

## 2019-11-19 NOTE — PLAN OF CARE
Problem: Alteration in Thoughts and Perception  Goal: Verbalize thoughts and feelings  Description  Interventions:  - Promote a nonjudgmental and trusting relationship with the patient through active listening and therapeutic communication  - Assess patient's level of functioning, behavior and potential for risk  - Engage patient in 1 on 1 interactions for a minimum of 15 minutes each session  - Encourage patient to express fears, feelings, frustrations, and discuss symptoms    - [place] patient to reality, help patient recognize reality-based thinking   - Administer medications as ordered and assess for potential side effects  - Provide the patient education related to the signs and symptoms of the illness and desired effects of prescribed medications  Outcome: Progressing  Goal: Agree to be compliant with medication regime, as prescribed and report medication side effects  Description  Interventions:  - Offer appropriate PRN medication and supervise ingestion; conduct aims, as needed   Outcome: Progressing  Goal: Attend and participate in unit activities, including therapeutic, recreational, and educational groups  Description  Interventions:  - Provide therapeutic and educational activities daily, encourage attendance and participation, and document same in the medical record     CERTIFIED PEER SPECIALIST INTERVENTIONS:    Complete peer assessment with patient to assess their needs and identify their goals to complete while in the recovery program as well as once discharged into the community  Patient will complete WRAP Plan, Crisis Plan and 5 Life Domains  Patient will attend 50% of groups offered on the unit  Patient will complete a goal card weekly      Outcome: Progressing     Problem: Depression  Goal: Refrain from isolation  Description  Interventions:  - Develop a trusting relationship   - Encourage socialization   Outcome: Progressing     [address] has been more visible tonight, sitting out @ phone chair in [place] when phone not in use, sitting in dining room  At meal ate only bites turkey, drank her juice, milk, had teret  Quite obsessive about her ability to swallow & its potential consequences upon her blood sugar, her low BP  Was encouraged to drink water, move about more to remedy the latter  Wanting accu checks, repeat BPs & unhappy when staff did not agree w/the urgency, need  Did come up for scheduled medicines, though, refused the Singulair  Did do the Incentive Spirometer w/credible volumes reached of 1000-1250ml  Admits isn't doing all she should to help herself (I e  Walking)  Did sit in on [employer] observing a card game  Had HS snack of ice cream & yogurt  Again saying can't swallow  Agrees may be a bit anxious  Accepted water to help her wash down snack  Wearing her QHS humidified nasal O2 @ 1L now for bed

## 2019-11-19 NOTE — ASSESSMENT & PLAN NOTE
Psychiatry Progress Note  Patient has admitted to being psychosomatic demanding to get her blood sugar checked, and having swallowing difficulties and breathing difficulties, even though she is asymptomatic  She is still refusing showers since 11/16/19 and does look disheveled and poorly groomed  She does eat most of her meals but then goes back to bed and lays down wearing the same clothing and refusing to change at all or get up and attend any groups  Her group attendance was only 14% last week despite lowering her group attendance expectation to only 25%   S  She still accuses some  staff of playing with her medications like her  inhalers states he does not trust some of the nurses on the unit   He appears to have fixed delusions about may be a micro chip implanted on her right forearm  She believes staff is trying to kill her by asking her to take showers as she feels so frail  No current suicidal homicidal thoughts intent or plans reported  No overt hallucinations or other delusions reported   No signs or sxs of agranulocytosis or myocarditis or endocarditis and she is moving her bowels so far with no issues   She was again reminded to work with the program and work towards going back to the [facility] and start attending groups at least 25% of the time we can look into discharging her back to the personal care home  Her QTc shows 482 milliseconds  from yesterday with no signs of myocarditis  QTc was 481 in June 19 as well     Current medications:    Current Facility-Administered Medications:     acetaminophen (TYLENOL) tablet 650 mg, 650 mg, Oral, Q6H PRN, Meredith Wesley MD    albuterol (PROVENTIL HFA,VENTOLIN HFA) inhaler 2 puff, 2 puff, Inhalation, Q4H PRN, Meredith Wesley MD, 2 puff at 10/11/19 0424    aluminum-magnesium hydroxide-simethicone (MYLANTA) 200-200-20 mg/5 mL oral suspension 15 mL, 15 mL, Oral, Q4H PRN, Meredith Wesley MD    ammonium lactate (LAC-HYDRIN) 12 % lotion 1 application, 1 application, Topical, BID PRN, Ervin Little MD    benzonatate (TESSALON PERLES) capsule 100 mg, 100 mg, Oral, TID PRN, Ervin Little MD    benztropine (COGENTIN) injection 1 mg, 1 mg, Intramuscular, Q8H PRN, Ervin Little MD    cloZAPine (CLOZARIL) tablet 25 mg, 25 mg, Oral, BID, Ervin Little MD, 25 mg at 11/18/19 2138    cloZAPine (CLOZARIL) tablet 50 mg, 50 mg, Oral, BID, Ervin Little MD, 50 mg at 11/18/19 2138    EPINEPHrine PF (ADRENALIN) 1 mg/mL injection 0 15 mg, 0 15 mg, Intramuscular, Once PRN, Ervin Little MD    fluticasone-vilanterol (BREO ELLIPTA) 200-25 MCG/INH inhaler 1 puff, 1 puff, Inhalation, Daily, Ervin Little MD, 1 puff at 11/19/19 0851    ketotifen (ZADITOR) 0 025 % ophthalmic solution 1 drop, 1 drop, Right Eye, BID PRN, Ervin Little MD    levothyroxine tablet 125 mcg, 125 mcg, Oral, Early Morning, Ervin Little MD, 125 mcg at 11/19/19 0600    magnesium hydroxide (MILK OF MAGNESIA) 400 mg/5 mL oral suspension 30 mL, 30 mL, Oral, Daily PRN, Ervin Little MD    montelukast (SINGULAIR) tablet 10 mg, 10 mg, Oral, HS, Ervin Little MD, 10 mg at 11/12/19 2141    OLANZapine (ZyPREXA) IM injection 5 mg, 5 mg, Intramuscular, Q8H PRN, Ervin Little MD    OLANZapine (ZyPREXA) tablet 5 mg, 5 mg, Oral, Q8H PRN, Ervin Little MD    ondansetron (ZOFRAN-ODT) dispersible tablet 4 mg, 4 mg, Oral, Q6H PRN, Ervin Little MD, 4 mg at 11/09/19 1223    pantoprazole (PROTONIX) EC tablet 40 mg, 40 mg, Oral, Early Morning, Ervin Little MD, 40 mg at 11/19/19 0600    polyethylene glycol (MIRALAX) packet 17 g, 17 g, Oral, Daily PRN, Ervin Little MD    polyvinyl alcohol (LIQUIFILM TEARS) 1 4 % ophthalmic solution 1 drop, 1 drop, Both Eyes, Q3H PRN, Ervin Little MD    sertraline (ZOLOFT) tablet 50 mg, 50 mg, Oral, Daily, Ervin Little MD, 50 mg at 11/19/19 0850    sucralfate (CARAFATE) oral suspension 1,000 mg, 1,000 mg, Oral, BID, Cat Torres MD, 1,000 mg at 11/18/19 1609    theophylline (JEF-24) 24 hr capsule 200 mg, 200 mg, Oral, Daily, Sindy Day MD, 200 mg at 11/19/19 0850    tiotropium [facility]) capsule for inhaler 18 mcg, 18 mcg, Inhalation, Daily, Sindy Day MD, 18 mcg at 11/19/19 0851    traZODone (DESYREL) tablet 25 mg, 25 mg, Oral, HS PRN, Sindy Day MD  Justification if on more than two antipsychotics:  Only on his clozapine  Side effects if any:  None    Risks , benefits, side effects and precautions of medications discussed with patient and reminded patient to let us know any problems with medications     Objective:     Vital Signs:  Vitals:    11/18/19 0705 11/18/19 1610 11/18/19 1950 11/19/19 0700   BP: 135/57 90/55 90/62 120/59   BP Location: Left arm Left arm Left arm Right arm   Pulse: 81 68 93 84   Resp: 18 18 18 18   Temp:  (!) 97 3 °F (36 3 °C) 98 4 °F (36 9 °C) (!) 97 2 °F (36 2 °C)   TempSrc:  Temporal Temporal    SpO2:  97% 98%    Weight:       Height:         Appearance:  age appropriate, casually dressed and overweight older than stated age, uncombed long grey hair, poorly groomed and unkempt disheveled fairl eye contact   Behavior:  evasive and guarded and suspicious  and preoccupied psychosomatic and argumentative    Speech:  normal pitch and normal volume but circumstantial and tangential with stilted speech off and on   Mood:  anxious and dysthymic   Affect:  mood-congruent, elated and entitled and irritated at times   Thought Process:  goal directed and illogical slightly pressured and tangential and talks as if in a court of law   Thought Content:  delusions  grandiose and somatic  about not being able to breathe despite being on nasal oxygen and paranoid about people out to harm her and kill her asking her to take showers, and does not trust the staff on the unit believing they are messing with her medications  No current suicidal homicidal thoughts intent or plans reported  No phobias obsessions or compulsions reported    Somatic delusions about having cancer or terrible illnesses like or having a device implanted on her  , her having low sugar and having swallowing difficulty   Perceptual Disturbances: None and does not appear responding to internal stimuli   Risk Potential: Tendency to not care for herself if she goes off treatment   Sensorium:  person, place, time/date, situation, day of week, month of year and time   Cognition:  grossly intact   Consciousness:  alert and awake    Attention: Intact concentration and attention span   Intellect: Considered to be at least of average intelligence   Insight:  limited and in denial of her illness   Judgment: poor      Motor Activity: no abnormal movements         Recent Labs:  Results Reviewed     None          I/O Past 24 hours:  No intake/output data recorded  No intake/output data recorded  Assessment / Plan:     [facility] )      Reason for continued inpatient care:  Because of underlying paranoia and refusal to go back to the personal care home and inability to care for herself on her own  Acceptance by patient:  Accepting  Rene Peacock in recovery:  About living in another personal care home once she feels better  Understanding of medications :  Has some understanding  Involved in reintegration process:  Adjusting to the unit  Trusting in relatoinship with psychiatrist:  Trusting    Recommended Treatment:    Medication changes:  1) no changes today and refuses to increase zoloft when suggested  Non-pharmacological treatments  1) continue with  individual therapy group therapy, milieu therapy and occupational therapy and milieu therapy involving multidisciplinary team approach with psychotherapist, case management, nursing, peer support services, art therapist etc using recovery principles and psycho-education about accepting illness and need for treatment     3) encourage to attend to ADLs like taking showers and wearing clean clothes   4) Encourage to attend groups   Safety  1) Safety/communication plan established targeting dynamic risk factors above  Discharge Plan most likely back to the a:  Personal care boarding home with act services once her delusions are managed and mood becomes more stabilized and she becomes more receptive to treatment compliance    Counseling / Coordination of Care    Total floor / unit time spent today 15 minutes  Greater than 50% of total time was spent with the patient and / or family counseling and / or coordination of care  A description of the counseling / coordination of care  Patient's Rights, confidentiality and exceptions to confidentiality, use of automated medical record, [facility] staff access to medical record, and consent to treatment reviewed      Sarah Hanna MD

## 2019-11-19 NOTE — PROGRESS NOTES
Patient declined      11/19/19 1500   Activity/Group Checklist   Group   (Recovery Workshop )   Attendance Did not attend   Attendance Duration (min) 46-60   Affect/Mood IRMA

## 2019-11-19 NOTE — PROGRESS NOTES
Progress Note - Nolberto Galvin 1955, 60 y o  female MRN: [id number]    Unit/Bed#: [facility] 110-02 Encounter: [id number]    Primary Care Provider: Aspen Metzger PA-C   Date and time admitted to hospital: 7/23/2019  5:30 PM        * Schizoaffective disorder, bipolar type [facility])  Assessment & Plan  Psychiatry Progress Note  Patient has admitted to being psychosomatic demanding to get her blood sugar checked, and having swallowing difficulties and breathing difficulties, even though she is asymptomatic  She is still refusing showers since 11/16/19 and does look disheveled and poorly groomed  She does eat most of her meals but then goes back to bed and lays down wearing the same clothing and refusing to change at all or get up and attend any groups  Her group attendance was only 14% last week despite lowering her group attendance expectation to only 25%   S  She still accuses some  staff of playing with her medications like her  inhalers states he does not trust some of the nurses on the unit   He appears to have fixed delusions about may be a micro chip implanted on her right forearm  She believes staff is trying to kill her by asking her to take showers as she feels so frail  No current suicidal homicidal thoughts intent or plans reported  No overt hallucinations or other delusions reported   No signs or sxs of agranulocytosis or myocarditis or endocarditis and she is moving her bowels so far with no issues   She was again reminded to work with the program and work towards going back to the [facility] and start attending groups at least 25% of the time we can look into discharging her back to the personal care home  Her QTc shows 482 milliseconds  from yesterday with no signs of myocarditis  QTc was 481 in June 19 as well     Current medications:    Current Facility-Administered Medications:     acetaminophen (TYLENOL) tablet 650 mg, 650 mg, Oral, Q6H PRN, Dalton Garner MD    albuterol (PROVENTIL HFA,VENTOLIN HFA) inhaler 2 puff, 2 puff, Inhalation, Q4H PRN, Jaz Beasley MD, 2 puff at 10/11/19 0424    aluminum-magnesium hydroxide-simethicone (MYLANTA) 200-200-20 mg/5 mL oral suspension 15 mL, 15 mL, Oral, Q4H PRN, Jaz Beasley MD    ammonium lactate (LAC-HYDRIN) 12 % lotion 1 application, 1 application, Topical, BID PRN, Jaz Beasley MD    benzonatate (TESSALON PERLES) capsule 100 mg, 100 mg, Oral, TID PRN, Jaz Beasley MD    benztropine (COGENTIN) injection 1 mg, 1 mg, Intramuscular, Q8H PRN, aJz Beasley MD    cloZAPine (CLOZARIL) tablet 25 mg, 25 mg, Oral, BID, Jaz Beasley MD, 25 mg at 11/18/19 2138    cloZAPine (CLOZARIL) tablet 50 mg, 50 mg, Oral, BID, Jaz Beasley MD, 50 mg at 11/18/19 2138    EPINEPHrine PF (ADRENALIN) 1 mg/mL injection 0 15 mg, 0 15 mg, Intramuscular, Once PRN, Jaz Beasley MD    fluticasone-vilanterol (BREO ELLIPTA) 200-25 MCG/INH inhaler 1 puff, 1 puff, Inhalation, Daily, Jaz Beasley MD, 1 puff at 11/19/19 0851    ketotifen (ZADITOR) 0 025 % ophthalmic solution 1 drop, 1 drop, Right Eye, BID PRN, Jaz Beasley MD    levothyroxine tablet 125 mcg, 125 mcg, Oral, Early Morning, Jaz Beasley MD, 125 mcg at 11/19/19 0600    magnesium hydroxide (MILK OF MAGNESIA) 400 mg/5 mL oral suspension 30 mL, 30 mL, Oral, Daily PRN, Jaz Beasley MD    montelukast (SINGULAIR) tablet 10 mg, 10 mg, Oral, HS, Jaz Beasley MD, 10 mg at 11/12/19 2141    OLANZapine (ZyPREXA) IM injection 5 mg, 5 mg, Intramuscular, Q8H PRN, Jaz Beasley MD    OLANZapine (ZyPREXA) tablet 5 mg, 5 mg, Oral, Q8H PRN, Jaz Beasley MD    ondansetron (ZOFRAN-ODT) dispersible tablet 4 mg, 4 mg, Oral, Q6H PRN, Jaz Beasley MD, 4 mg at 11/09/19 1223    pantoprazole (PROTONIX) EC tablet 40 mg, 40 mg, Oral, Early Morning, Jaz Beasley MD, 40 mg at 11/19/19 0600    polyethylene glycol (MIRALAX) packet 17 g, 17 g, Oral, Daily PRN, Jaz Beasley MD    polyvinyl alcohol (LIQUIFILM TEARS) 1 4 % ophthalmic solution 1 drop, 1 drop, Both Eyes, Q3H PRN, Meredith Wesley MD    sertraline (ZOLOFT) tablet 50 mg, 50 mg, Oral, Daily, Meredith Wesley MD, 50 mg at 11/19/19 0850    sucralfate (CARAFATE) oral suspension 1,000 mg, 1,000 mg, Oral, BID, Stiven Pryor MD, 1,000 mg at 11/18/19 1609    theophylline (JEF-24) 24 hr capsule 200 mg, 200 mg, Oral, Daily, Meredith Wesley MD, 200 mg at 11/19/19 0850    tiotropium (SPIRIVA) capsule for inhaler 18 mcg, 18 mcg, Inhalation, Daily, Meredith Wesley MD, 18 mcg at 11/19/19 0851    traZODone (DESYREL) tablet 25 mg, 25 mg, Oral, HS PRN, Meredith Wesley MD  Justification if on more than two antipsychotics:  Only on his clozapine  Side effects if any:  None    Risks , benefits, side effects and precautions of medications discussed with patient and reminded patient to let us know any problems with medications     Objective:     Vital Signs:  Vitals:    11/18/19 0705 11/18/19 1610 11/18/19 1950 11/19/19 0700   BP: 135/57 90/55 90/62 120/59   BP Location: Left arm Left arm Left arm Right arm   Pulse: 81 68 93 84   Resp: 18 18 18 18   Temp:  (!) 97 3 °F (36 3 °C) 98 4 °F (36 9 °C) (!) 97 2 °F (36 2 °C)   TempSrc:  Temporal Temporal    SpO2:  97% 98%    Weight:       Height:         Appearance:  age appropriate, casually dressed and overweight older than stated age, uncombed long grey hair, poorly groomed and unkempt disheveled fairl eye contact   Behavior:  evasive and guarded and suspicious  and preoccupied psychosomatic and argumentative    Speech:  normal pitch and normal volume but circumstantial and tangential with stilted speech off and on   Mood:  anxious and dysthymic   Affect:  mood-congruent, elated and entitled and irritated at times   Thought Process:  goal directed and illogical slightly pressured and tangential and talks as if in a court of law   Thought Content:  delusions  grandiose and somatic  about not being able to breathe despite being on nasal oxygen and paranoid about people out to harm her and kill her asking her to take showers, and does not trust the staff on the unit believing they are messing with her medications  No current suicidal homicidal thoughts intent or plans reported  No phobias obsessions or compulsions reported  Somatic delusions about having cancer or terrible illnesses like or having a device implanted on her  , her having low sugar and having swallowing difficulty   Perceptual Disturbances: None and does not appear responding to internal stimuli   Risk Potential: Tendency to not care for herself if she goes off treatment   Sensorium:  person, place, time/date, situation, day of week, month of year and time   Cognition:  grossly intact   Consciousness:  alert and awake    Attention: Intact concentration and attention span   Intellect: Considered to be at least of average intelligence   Insight:  limited and in denial of her illness   Judgment: poor      Motor Activity: no abnormal movements         Recent Labs:  Results Reviewed     None          I/O Past 24 hours:  No intake/output data recorded  No intake/output data recorded          Assessment / Plan:     [facility]ive [facility]      Reason for continued inpatient care:  Because of underlying paranoia and refusal to go back to the personal care home and inability to care for herself on her own  Acceptance by patient:  Accepting  Mateusz Mcghee in recovery:  About living in another personal care home once she feels better  Understanding of medications :  Has some understanding  Involved in reintegration process:  Adjusting to the unit  Trusting in relatoinship with psychiatrist:  Trusting    Recommended Treatment:    Medication changes:  1) no changes today and refuses to increase zoloft when suggested  Non-pharmacological treatments  1) continue with  individual therapy group therapy, milieu therapy and occupational therapy and milieu therapy involving multidisciplinary team approach with psychotherapist, case management, nursing, peer support services, art therapist etc using recovery principles and psycho-education about accepting illness and need for treatment   3) encourage to attend to ADLs like taking showers and wearing clean clothes   4) Encourage to attend groups   Safety  1) Safety/communication plan established targeting dynamic risk factors above  Discharge Plan most likely back to the a:  Personal care boarding home with act services once her delusions are managed and mood becomes more stabilized and she becomes more receptive to treatment compliance    Counseling / Coordination of Care    Total floor / unit time spent today 15 minutes  Greater than 50% of total time was spent with the patient and / or family counseling and / or coordination of care  A description of the counseling / coordination of care  Patient's Rights, confidentiality and exceptions to confidentiality, use of automated medical record, [facility] staff access to medical record, and consent to treatment reviewed      Lonnie Dykes MD

## 2019-11-19 NOTE — PLAN OF CARE
Problem: Alteration in Thoughts and Perception  Goal: Verbalize thoughts and feelings  Description  Interventions:  - Promote a nonjudgmental and trusting relationship with the patient through active listening and therapeutic communication  - Assess patient's level of functioning, behavior and potential for risk  - Engage patient in 1 on 1 interactions for a minimum of 15 minutes each session  - Encourage patient to express fears, feelings, frustrations, and discuss symptoms    - [place] patient to reality, help patient recognize reality-based thinking   - Administer medications as ordered and assess for potential side effects  - Provide the patient education related to the signs and symptoms of the illness and desired effects of prescribed medications  Outcome: Progressing  Goal: Agree to be compliant with medication regime, as prescribed and report medication side effects  Description  Interventions:  - Offer appropriate PRN medication and supervise ingestion; conduct aims, as needed   Outcome: Progressing     Problem: Depression  Goal: Verbalize thoughts and feelings  Description  Interventions:  - Assess and re-assess patient's level of risk   - Engage patient in 1:1 interactions, daily, for a minimum of 15 minutes   - Encourage patient to express feelings, fears, frustrations, hopes   Outcome: Progressing     Problem: Anxiety  Goal: Anxiety is at manageable level  Description  Interventions:  - Assess and monitor patient's anxiety level  - Monitor for signs and symptoms of anxiety both physical and emotional (heart palpitations, chest pain, shortness of breath, headaches, nausea, feeling jumpy, restlessness, irritable, apprehensive)  - Collaborate with interdisciplinary team and initiate plan and interventions as ordered    - [place] patient to unit/surroundings  - Explain treatment plan  - Encourage participation in care  - Encourage verbalization of concerns/fears  - Identify coping mechanisms  - Assist in developing anxiety-reducing skills  - Administer/offer alternative therapies  - Limit or eliminate stimulants  Outcome: Progressing     Problem: SAFETY ADULT  Goal: Patient will remain free of falls  Description  INTERVENTIONS:  - Assess patient frequently for physical needs  -  Identify cognitive and physical deficits and behaviors that affect risk of falls    -  [place] fall precautions as indicated by assessment   - Educate patient/family on patient safety including physical limitations  - Instruct patient to call for assistance with activity based on assessment  - Modify environment to reduce risk of injury  - Consider OT/PT consult to assist with strengthening/mobility  Outcome: Progressing     Problem: RESPIRATORY - ADULT  Goal: Achieves optimal ventilation and oxygenation  Description  INTERVENTIONS:  - Assess for changes in respiratory status  - Assess for changes in mentation and behavior  - Position to facilitate oxygenation and minimize respiratory effort  - Oxygen administration by appropriate delivery method based on oxygen saturation (per order) or ABGs  - Initiate smoking cessation education as indicated  - Encourage broncho-pulmonary hygiene including cough, deep breathe, Incentive Spirometry  - Assess the need for suctioning and aspirate as needed  - Assess and instruct to report SOB or any respiratory difficulty  - Respiratory Therapy support as indicated  Outcome: Progressing     Problem: Alteration in Thoughts and Perception  Goal: Treatment Goal: Gain control of psychotic behaviors/thinking, reduce/eliminate presenting symptoms and demonstrate improved reality functioning upon discharge  Outcome: Not Progressing  Goal: Attend and participate in unit activities, including therapeutic, recreational, and educational groups  Description  Interventions:  - Provide therapeutic and educational activities daily, encourage attendance and participation, and document same in the medical record CERTIFIED PEER SPECIALIST INTERVENTIONS:    Complete peer assessment with patient to assess their needs and identify their goals to complete while in the recovery program as well as once discharged into the community  Patient will complete WRAP Plan, Crisis Plan and 5 Life Domains  Patient will attend 50% of groups offered on the unit  Patient will complete a goal card weekly      Outcome: Not Progressing  Goal: Recognize dysfunctional thoughts, communicate reality-based thoughts at the time of discharge  Description  Interventions:  - Provide medication and psycho-education to assist patient in compliance and developing insight into his/her illness   Outcome: Not Progressing  Goal: Complete daily ADLs, including personal hygiene independently, as able  Description  Interventions:  - Observe, teach, and assist patient with ADLS  - Monitor and promote a balance of rest/activity, with adequate nutrition and elimination   Outcome: Not Progressing     Problem: Ineffective Coping  Goal: Identifies ineffective coping skills  Outcome: Not Progressing  Goal: Identifies healthy coping skills  Outcome: Not Progressing  Goal: Demonstrates healthy coping skills  Outcome: Not Progressing  Goal: Participates in unit activities  Description  Interventions:  - Provide therapeutic environment   - Provide required programming   - Redirect inappropriate behaviors   Outcome: Not Progressing  Goal: Patient/Family participate in treatment and DC plans  Description  Interventions:  - Provide therapeutic environment  Outcome: Not Progressing  Goal: Patient/Family verbalizes awareness of resources  Outcome: Not Progressing  Goal: Understands least restrictive measures  Description  Interventions:  - Utilize least restrictive behavior  Outcome: Not Progressing     Problem: Depression  Goal: Treatment Goal: Demonstrate behavioral control of depressive symptoms, verbalize feelings of improved mood/affect, and adopt new coping skills prior to discharge  Outcome: Not Progressing  Goal: Refrain from isolation  Description  Interventions:  - Develop a trusting relationship   - Encourage socialization   Outcome: Not Progressing  Goal: Refrain from self-neglect  Outcome: Not Progressing     Problem: Alteration in Orientation  Goal: Interact with staff daily  Description  Interventions:  - Assess and re-assess patient's level of orientation  - Engage patient in 1 on 1 interactions, daily, for a minimum of 15 minutes   - Establish rapport/trust with patient   Outcome: Not Progressing  Goal: Cooperate with recommended testing/procedures  Description  Interventions:  - Determine need for ancillary testing  - Observe for mental status changes  - Implement falls/precaution protocol   Outcome: Not Progressing     Problem: PAIN - ADULT  Goal: Verbalizes/displays adequate comfort level or baseline comfort level  Description  Interventions:  - Encourage patient to monitor pain and request assistance  - Assess pain using appropriate pain scale  - Administer analgesics based on type and severity of pain and evaluate response  - Implement non-pharmacological measures as appropriate and evaluate response  - Consider cultural and social influences on pain and pain management  - Notify physician/advanced practitioner if interventions unsuccessful or patient reports new pain  Outcome: Not Progressing     Problem: DISCHARGE PLANNING  Goal: Discharge to home or other facility with appropriate resources  Description  INTERVENTIONS:  - Conduct assessment to determine patient/family and health care team treatment goals, and need for post-acute services based on payer coverage, community resources, and patient preferences, and barriers to discharge  - Address psychosocial, clinical, and financial barriers to discharge as identified in assessment in conjunction with the patient/family and health care team  - Assist the patient in reintegration back into the community by removing barriers which may hinder a successful discharge once deemed stable  - Arrange appropriate level of post-acute services according to patient's needs and preference and payer coverage in collaboration with the physician and health care team  - Communicate with and update the patient/family, physician, and health care team regarding progress on the discharge plan  - Arrange appropriate transportation to post-acute venues    Outcome: Not Progressing     Problem: Nutrition/Hydration-ADULT  Goal: Nutrient/Hydration intake appropriate for improving, restoring or maintaining nutritional needs  Description  Monitor and assess patient's nutrition/hydration status for malnutrition  Collaborate with interdisciplinary team and initiate plan and interventions as ordered  Monitor patient's weight and dietary intake as ordered or per policy  Utilize nutrition screening tool and intervene as necessary  Determine patient's food preferences and provide high-protein, high-caloric foods as appropriate  INTERVENTIONS:  - Monitor oral intake, urinary output, labs, and treatment plans  - Assess nutrition and hydration status and recommend course of action  - Evaluate amount of meals eaten  - Assist patient with eating if necessary   - Allow adequate time for meals  - Recommend/ encourage appropriate diets, oral nutritional supplements, and vitamin/mineral supplements  - Order, calculate, and assess calorie counts as needed  - Recommend, monitor, and adjust tube feedings and TPN/PPN based on assessed needs  - Assess need for intravenous fluids  - Provide specific nutrition/hydration education as appropriate  - Include patient/family/caregiver in decisions related to nutrition  Outcome: Not Progressing       Patient is alert and isolative to room and bed  Non compliant with program and hygiene  Denies depression, anxiety, SI and HI however, behaviors contradict same  No c/o s/s pain, discomfort or distress    Patient did not attend groups and ate only 15% of breakfast and 25% of lunch  Patient is compliant with meds w/o prompting  No socialization with peers or staff    Will continue to monitor progress in recovery program

## 2019-11-20 NOTE — PLAN OF CARE
Problem: Alteration in Thoughts and Perception  Goal: Treatment Goal: Gain control of psychotic behaviors/thinking, reduce/eliminate presenting symptoms and demonstrate improved reality functioning upon discharge  Outcome: Progressing  Goal: Verbalize thoughts and feelings  Description  Interventions:  - Promote a nonjudgmental and trusting relationship with the patient through active listening and therapeutic communication  - Assess patient's level of functioning, behavior and potential for risk  - Engage patient in 1 on 1 interactions for a minimum of 15 minutes each session  - Encourage patient to express fears, feelings, frustrations, and discuss symptoms    - [place] patient to reality, help patient recognize reality-based thinking   - Administer medications as ordered and assess for potential side effects  - Provide the patient education related to the signs and symptoms of the illness and desired effects of prescribed medications  Outcome: Progressing  Goal: Agree to be compliant with medication regime, as prescribed and report medication side effects  Description  Interventions:  - Offer appropriate PRN medication and supervise ingestion; conduct aims, as needed   Outcome: Progressing  Goal: Attend and participate in unit activities, including therapeutic, recreational, and educational groups  Description  Interventions:  - Provide therapeutic and educational activities daily, encourage attendance and participation, and document same in the medical record     CERTIFIED PEER SPECIALIST INTERVENTIONS:    Complete peer assessment with patient to assess their needs and identify their goals to complete while in the recovery program as well as once discharged into the community  Patient will complete WRAP Plan, Crisis Plan and 5 Life Domains  Patient will attend 50% of groups offered on the unit  Patient will complete a goal card weekly      Outcome: Progressing     Problem: Ineffective Coping  Goal: Participates in unit activities  Description  Interventions:  - Provide therapeutic environment   - Provide required programming   - Redirect inappropriate behaviors   Outcome: Progressing  Goal: Patient/Family verbalizes awareness of resources  Outcome: Progressing  Goal: Understands least restrictive measures  Description  Interventions:  - Utilize least restrictive behavior  Outcome: Progressing     Problem: Risk for Self Injury/Neglect  Goal: Treatment Goal: Remain safe during length of stay, learn and adopt new coping skills, and be free of self-injurious ideation, impulses and acts at the time of discharge  Outcome: Progressing  Goal: Verbalize thoughts and feelings  Description  Interventions:  - Assess and re-assess patient's lethality and potential for self-injury  - Engage patient in 1:1 interactions, daily, for a minimum of 15 minutes  - Encourage patient to express feelings, fears, frustrations, hopes  - Establish rapport/trust with patient   Outcome: Progressing  Goal: Refrain from harming self  Description  Interventions:  - Monitor patient closely, per order  - Develop a trusting relationship  - Supervise medication ingestion, monitor effects and side effects   Outcome: Progressing  Goal: Attend and participate in unit activities, including therapeutic, recreational, and educational groups  Description  Interventions:  - Provide therapeutic and educational activities daily, encourage attendance and participation, and document same in the medical record  - Obtain collateral information, encourage visitation and family involvement in care   Outcome: Progressing     Problem: Depression  Goal: Treatment Goal: Demonstrate behavioral control of depressive symptoms, verbalize feelings of improved mood/affect, and adopt new coping skills prior to discharge  Outcome: Progressing  Goal: Verbalize thoughts and feelings  Description  Interventions:  - Assess and re-assess patient's level of risk   - Engage patient in 1:1 interactions, daily, for a minimum of 15 minutes   - Encourage patient to express feelings, fears, frustrations, hopes   Outcome: Progressing  Goal: Refrain from isolation  Description  Interventions:  - Develop a trusting relationship   - Encourage socialization   Outcome: Progressing     Problem: Anxiety  Goal: Anxiety is at manageable level  Description  Interventions:  - Assess and monitor patient's anxiety level  - Monitor for signs and symptoms of anxiety both physical and emotional (heart palpitations, chest pain, shortness of breath, headaches, nausea, feeling jumpy, restlessness, irritable, apprehensive)  - Collaborate with interdisciplinary team and initiate plan and interventions as ordered    - [place] patient to unit/surroundings  - Explain treatment plan  - Encourage participation in care  - Encourage verbalization of concerns/fears  - Identify coping mechanisms  - Assist in developing anxiety-reducing skills  - Administer/offer alternative therapies  - Limit or eliminate stimulants  Outcome: Progressing     Problem: Alteration in Orientation  Goal: Interact with staff daily  Description  Interventions:  - Assess and re-assess patient's level of orientation  - Engage patient in 1 on 1 interactions, daily, for a minimum of 15 minutes   - Establish rapport/trust with patient   Outcome: Progressing     Problem: PAIN - ADULT  Goal: Verbalizes/displays adequate comfort level or baseline comfort level  Description  Interventions:  - Encourage patient to monitor pain and request assistance  - Assess pain using appropriate pain scale  - Administer analgesics based on type and severity of pain and evaluate response  - Implement non-pharmacological measures as appropriate and evaluate response  - Consider cultural and social influences on pain and pain management  - Notify physician/advanced practitioner if interventions unsuccessful or patient reports new pain  Outcome: Progressing     Problem: SAFETY ADULT  Goal: Patient will remain free of falls  Description  INTERVENTIONS:  - Assess patient frequently for physical needs  -  Identify cognitive and physical deficits and behaviors that affect risk of falls    -  [place] fall precautions as indicated by assessment   - Educate patient/family on patient safety including physical limitations  - Instruct patient to call for assistance with activity based on assessment  - Modify environment to reduce risk of injury  - Consider OT/PT consult to assist with strengthening/mobility  Outcome: Progressing     Problem: Alteration in Thoughts and Perception  Goal: Recognize dysfunctional thoughts, communicate reality-based thoughts at the time of discharge  Description  Interventions:  - Provide medication and psycho-education to assist patient in compliance and developing insight into his/her illness   Outcome: Not Progressing  Goal: Complete daily ADLs, including personal hygiene independently, as able  Description  Interventions:  - Observe, teach, and assist patient with ADLS  - Monitor and promote a balance of rest/activity, with adequate nutrition and elimination   Outcome: Not Progressing     Problem: Risk for Self Injury/Neglect  Goal: Complete daily ADLs, including personal hygiene independently, as able  Description  Interventions:  - Observe, teach, and assist patient with ADLS  - Monitor and promote a balance of rest/activity, with adequate nutrition and elimination  Outcome: Not Progressing     Problem: Depression  Goal: Refrain from self-neglect  Outcome: Not Progressing     Problem: RESPIRATORY - ADULT  Goal: Achieves optimal ventilation and oxygenation  Description  INTERVENTIONS:  - Assess for changes in respiratory status  - Assess for changes in mentation and behavior  - Position to facilitate oxygenation and minimize respiratory effort  - Oxygen administration by appropriate delivery method based on oxygen saturation (per order) or ABGs  - Initiate smoking cessation education as indicated  - Encourage broncho-pulmonary hygiene including cough, deep breathe, Incentive Spirometry  - Assess the need for suctioning and aspirate as needed  - Assess and instruct to report SOB or any respiratory difficulty  - Respiratory Therapy support as indicated  Outcome: Not Progressing     Problem: Nutrition/Hydration-ADULT  Goal: Nutrient/Hydration intake appropriate for improving, restoring or maintaining nutritional needs  Description  Monitor and assess patient's nutrition/hydration status for malnutrition  Collaborate with interdisciplinary team and initiate plan and interventions as ordered  Monitor patient's weight and dietary intake as ordered or per policy  Utilize nutrition screening tool and intervene as necessary  Determine patient's food preferences and provide high-protein, high-caloric foods as appropriate  INTERVENTIONS:  - Monitor oral intake, urinary output, labs, and treatment plans  - Assess nutrition and hydration status and recommend course of action  - Evaluate amount of meals eaten  - Assist patient with eating if necessary   - Allow adequate time for meals  - Recommend/ encourage appropriate diets, oral nutritional supplements, and vitamin/mineral supplements  - Order, calculate, and assess calorie counts as needed  - Recommend, monitor, and adjust tube feedings and TPN/PPN based on assessed needs  - Assess need for intravenous fluids  - Provide specific nutrition/hydration education as appropriate  - Include patient/family/caregiver in decisions related to nutrition  Outcome: Not Progressing   The patient has been mostly isolative to her room, laying in her bed but did come out for meds and meals and attended IMR  No somatic complaints voiced  Did not shower  She still does not follow staff or doctor recommendations and refused to use the incentive spirometer  Ate 75% of breakfast and 10% of lunch  No other behaviors or issues noted   Continue to monitor

## 2019-11-20 NOTE — PROGRESS NOTES
11/19/19 0900   Team Meeting   Meeting Type Tx Team Meeting   Initial Conference Date 11/19/19   Next Conference Date 11/26/19   Team Members Present   Team Members Present Physician;Nurse; Other (Discipline and Name)   Physician Team Member Dr Jeannette Jiang Team Member Arnaud Gutierrez RN   Other (Discipline and Name) Porfirio Javier LCSW   Patient/Family Present   Patient Present Yes   Patient's Family Present No     Patient attended treatment team meeting this morning without self-assessment  Patient's group attendance for last week was 14%  Patient acknowledges her "psychosomatic symptoms," but does not take responsibility any further than that  Team encouraged patient to continue trying to push herself to complete recovery-based activities  Team and patient completed risk assessment and the patient did not verbalize any desire to elope from the program   Patient verbalized understanding of consequences of eloping from treatment while on a commitment  Patient verbalized no further questions or concerns at the conclusion of the meeting  Next team meeting scheduled for 11/26

## 2019-11-20 NOTE — PROGRESS NOTES
11/20/19 0830   Team Meeting   Meeting Type Daily Rounds   Team Members Present   Team Members Present Physician;Nurse;; Other (Discipline and Name)   Physician Team Member Dr Ricardo Rivera Team Member Delphine Whiting RN   Care Management Team Member Brenda Sullivan   Other (Discipline and Name) Twan Richards [place]; SHANIKA Liriano     Patient is still not attending groups  No behavioral changes; still somatic and resistive to redirection from staff  Last time she washed her hair was 11/09 and last shower was 11/16  Slept

## 2019-11-20 NOTE — PLAN OF CARE
Problem: Alteration in Thoughts and Perception  Goal: Verbalize thoughts and feelings  Description  Interventions:  - Promote a nonjudgmental and trusting relationship with the patient through active listening and therapeutic communication  - Assess patient's level of functioning, behavior and potential for risk  - Engage patient in 1 on 1 interactions for a minimum of 15 minutes each session  - Encourage patient to express fears, feelings, frustrations, and discuss symptoms    - [place] patient to reality, help patient recognize reality-based thinking   - Administer medications as ordered and assess for potential side effects  - Provide the patient education related to the signs and symptoms of the illness and desired effects of prescribed medications  Outcome: Progressing  Goal: Agree to be compliant with medication regime, as prescribed and report medication side effects  Description  Interventions:  - Offer appropriate PRN medication and supervise ingestion; conduct aims, as needed   Outcome: Progressing     Problem: RESPIRATORY - ADULT  Goal: Achieves optimal ventilation and oxygenation  Description  INTERVENTIONS:  - Assess for changes in respiratory status  - Assess for changes in mentation and behavior  - Position to facilitate oxygenation and minimize respiratory effort  - Oxygen administration by appropriate delivery method based on oxygen saturation (per order) or ABGs  - Initiate smoking cessation education as indicated  - Encourage broncho-pulmonary hygiene including cough, deep breathe, Incentive Spirometry  - Assess the need for suctioning and aspirate as needed  - Assess and instruct to report SOB or any respiratory difficulty  - Respiratory Therapy support as indicated  Outcome: Progressing     Problem: Alteration in Thoughts and Perception  Goal: Attend and participate in unit activities, including therapeutic, recreational, and educational groups  Description  Interventions:  - Provide therapeutic and educational activities daily, encourage attendance and participation, and document same in the medical record     CERTIFIED PEER SPECIALIST INTERVENTIONS:    Complete peer assessment with patient to assess their needs and identify their goals to complete while in the recovery program as well as once discharged into the community  Patient will complete WRAP Plan, Crisis Plan and 5 Life Domains  Patient will attend 50% of groups offered on the unit  Patient will complete a goal card weekly  Outcome: Not Progressing  Goal: Complete daily ADLs, including personal hygiene independently, as able  Description  Interventions:  - Observe, teach, and assist patient with ADLS  - Monitor and promote a balance of rest/activity, with adequate nutrition and elimination   Outcome: Not Progressing     Problem: Depression  Goal: Refrain from isolation  Description  Interventions:  - Develop a trusting relationship   - Encourage socialization   Outcome: Not Progressing     [address] Segal has been isolative to her room & bed except when out for meal, scheduled medicine, visit w/sister  At supper ate all her turkey, 120ml milk, bites cauliflower & pudding  When sister visited enjoyed a "Keshawn smoothie", but, then concerned would have an allergic reaction/anaphylaxis  Was told to stop this train of thought immediately, instead give herself credit for enjoying this treat & interactions w/her sister  Was able to comply  Did not attend PM Group, did not attend to any hygiene  Did use the Incentive Spirometer reaching volumes of 1000-1100ml  Had an HS snack of 2 yogurts  Took all HS medicine except the Singulair  Was able to have some pride in her improved intake this evening  Wearing now the QHS humidified nasal O2 @ 1L for bed

## 2019-11-20 NOTE — PROGRESS NOTES
Patient able to identify with group topic/ Patient also identified what she needs to do different and behaviors she needs to change      11/20/19 1100   Activity/Group Checklist   Group   (IMR/ Disabled by Despair)   Attendance Attended   Attendance Duration (min) 46-60   Interactions Interacted appropriately   Affect/Mood Appropriate;Normal range   Goals Achieved Identified feelings; Discussed coping strategies; Able to engage in interactions; Able to listen to others; Able to reflect/comment on own behavior;Able to self-disclose

## 2019-11-20 NOTE — PROGRESS NOTES
Progress Note - Grecia Mealing 1955, 60 y o  female MRN: [id number]    Unit/Bed#: [facility] 110-02 Encounter: [id number]    Primary Care Provider: Srinivasan Charles PA-C   Date and time admitted to hospital: 7/23/2019  5:30 PM        * Schizoaffective disorder, bipolar type [facility])  Assessment & Plan  Psychiatry Progress Note  Patient is still passive-aggressive refusing to take showers since last Saturday and refusing to attend any groups claiming that she is sick and frail  She continues to look disheveled and poorly groomed  She does eat most of her meals but then goes back to bed and lays down wearing the same clothing and refusing to change at all  She still accuses some  staff of playing with her medications like her oxygen concentrator at night and her spirometer and inhalers continued to state that she does not trust some of the nurses on the unit   She appears to have fixed delusions about may be a micro chip implanted on her right forearm off and on but sometimes she states it is a lipoma  She believes staff is trying to kill her by asking her to take showers  No current suicidal homicidal thoughts intent or plans reported  No overt hallucinations or other delusions reported other than somatic delusions and paranoid  No signs or sxs of agranulocytosis or myocarditis or endocarditis and she is moving her bowels so far with no issues   She was again reminded to work with the program and work towards going back to the [facility] and start attending groups at least 25% of the time we can look into discharging her back to the personal care home     Current medications:    Current Facility-Administered Medications:     acetaminophen (TYLENOL) tablet 650 mg, 650 mg, Oral, Q6H PRN, Shilpa Trujillo MD    albuterol (PROVENTIL HFA,VENTOLIN HFA) inhaler 2 puff, 2 puff, Inhalation, Q4H PRN, Shilpa Trujillo MD, 2 puff at 10/11/19 0424    aluminum-magnesium hydroxide-simethicone (Mariama Licona) 153-627-88 mg/5 mL oral suspension 15 mL, 15 mL, Oral, Q4H PRN, Deedee Muir MD    ammonium lactate (LAC-HYDRIN) 12 % lotion 1 application, 1 application, Topical, BID PRN, Deedee Muir MD    benzonatate (TESSALON PERLES) capsule 100 mg, 100 mg, Oral, TID PRN, Deedee Muir MD    benztropine (COGENTIN) injection 1 mg, 1 mg, Intramuscular, Q8H PRN, Deedee Muir MD    cloZAPine (CLOZARIL) tablet 25 mg, 25 mg, Oral, BID, Deedee Muir MD, 25 mg at 11/19/19 2146    cloZAPine (CLOZARIL) tablet 50 mg, 50 mg, Oral, BID, Deedee Muir MD, 50 mg at 11/19/19 2146    EPINEPHrine PF (ADRENALIN) 1 mg/mL injection 0 15 mg, 0 15 mg, Intramuscular, Once PRN, Deedee Muir MD    fluticasone-vilanterol (BREO ELLIPTA) 200-25 MCG/INH inhaler 1 puff, 1 puff, Inhalation, Daily, Deedee Muir MD, 1 puff at 11/20/19 0901    ketotifen (ZADITOR) 0 025 % ophthalmic solution 1 drop, 1 drop, Right Eye, BID PRN, Deedee Muir MD    levothyroxine tablet 125 mcg, 125 mcg, Oral, Early Morning, Deedee Muir MD, 125 mcg at 11/20/19 0600    magnesium hydroxide (MILK OF MAGNESIA) 400 mg/5 mL oral suspension 30 mL, 30 mL, Oral, Daily PRN, Deedee Muir MD    montelukast (SINGULAIR) tablet 10 mg, 10 mg, Oral, HS, Deedee Muir MD, 10 mg at 11/12/19 2141    OLANZapine (ZyPREXA) IM injection 5 mg, 5 mg, Intramuscular, Q8H PRN, Deedee Muir MD    OLANZapine (ZyPREXA) tablet 5 mg, 5 mg, Oral, Q8H PRN, Deedee Muir MD    ondansetron (ZOFRAN-ODT) dispersible tablet 4 mg, 4 mg, Oral, Q6H PRN, Deedee Muir MD, 4 mg at 11/09/19 1223    pantoprazole (PROTONIX) EC tablet 40 mg, 40 mg, Oral, Early Morning, Deedee Muir MD, 40 mg at 11/20/19 0601    polyethylene glycol (MIRALAX) packet 17 g, 17 g, Oral, Daily PRN, Deedee Muir MD    polyvinyl alcohol (LIQUIFILM TEARS) 1 4 % ophthalmic solution 1 drop, 1 drop, Both Eyes, Q3H PRN, Deedee Muir MD    sertraline (ZOLOFT) tablet 50 mg, 50 mg, Oral, Daily, Deedee Muir MD, 50 mg at 11/20/19 0901    sucralfate (Carvel Cowper) oral suspension 1,000 mg, 1,000 mg, Oral, BID, Cat Torres MD, 1,000 mg at 11/19/19 1619    theophylline (JEF-24) 24 hr capsule 200 mg, 200 mg, Oral, Daily, Manuel Copeland MD, 200 mg at 11/20/19 0901    tiotropium (SPIRIVA) capsule for inhaler 18 mcg, 18 mcg, Inhalation, Daily, Manuel Copeland MD, 18 mcg at 11/20/19 0901    traZODone (DESYREL) tablet 25 mg, 25 mg, Oral, HS PRN, Manuel Copeland MD  Justification if on more than two antipsychotics:  Only on his clozapine  Side effects if any:  None    Risks , benefits, side effects and precautions of medications discussed with patient and reminded patient to let us know any problems with medications     Objective:     Vital Signs:  Vitals:    11/19/19 1640 11/19/19 2041 11/20/19 0700 11/20/19 0705   BP: 94/55 117/81 102/63 90/51   BP Location: Left arm Right arm Left arm Left arm   Pulse: 69 91 77 63   Resp: 16 16 18 18   Temp: 97 8 °F (36 6 °C) (!) 97 2 °F (36 2 °C) 97 7 °F (36 5 °C)    TempSrc: Temporal Temporal Temporal    SpO2: 92% 95% 90%    Weight:       Height:         Appearance:  age appropriate, casually dressed and overweight older than stated age, uncombed long grey hair, poorly groomed and unkempt disheveled with a bad body order   Behavior:  evasive and guarded and suspicious  and preoccupied psychosomatic and argumentative and polite when approached but suspicious and demanding   Speech:  normal pitch and normal volume but circumstantial and tangential with stilted speech off and on   Mood:  anxious and dysthymic   Affect:  mood-congruent, elated and entitled and irritated    Thought Process:  goal directed and illogical slightly pressured and tangential and talks as if in a court of law   Thought Content:  delusions  grandiose and somatic  about not being able to breathe and paranoid about people out to harm her and kill her asking her to take showers, and believing they are messing with her medications    No current suicidal homicidal thoughts intent or plans reported  No phobias obsessions or compulsions reported  Somatic delusions about having cancer or terrible illnesses like or having a device implanted on her right forearm, her having low sugar and having swallowing difficulty or having breathing difficulty and staff trying to kill her    Perceptual Disturbances: None and does not appear responding to internal stimuli at times   Risk Potential: Tendency to not care for herself if she goes off treatment   Sensorium:  person, place, time/date, situation, day of week, month of year and time   Cognition:  grossly intact   Consciousness:  alert and awake    Attention: Intact concentration and attention span   Intellect: Considered to be at least of average intelligence   Insight:  limited and in denial of her illness   Judgment: poor      Motor Activity: no abnormal movements         Recent Labs:  Results Reviewed     None          I/O Past 24 hours:  No intake/output data recorded  No intake/output data recorded          Assessment / Plan:     Schizoaffective disorder, [facility] ([facility] )      Reason for continued inpatient care:  Because of underlying paranoia and refusal to go back to the personal care home and inability to care for herself on her own  Acceptance by patient:  Accepting  Clara Saenz in recovery:  About living in another personal care home once she feels better  Understanding of medications :  Has some understanding  Involved in reintegration process:  Adjusting to the unit  Trusting in relatoinship with psychiatrist:  Trusting    Recommended Treatment:    Medication changes:  1) no changes today and refuses to increase zoloft when suggested  Non-pharmacological treatments  1) continue with  individual therapy group therapy, milieu therapy and occupational therapy and milieu therapy involving multidisciplinary team approach with psychotherapist, case management, nursing, peer support services, art therapist etc using recovery principles and psycho-education about accepting illness and need for treatment   3) encourage to attend to ADLs like taking showers and wearing clean clothes   4) Encourage to attend groups   Safety  1) Safety/communication plan established targeting dynamic risk factors above  Discharge Plan most likely back to the a:  Personal care boarding home with [facility] services once her delusions are managed and mood becomes more stabilized and she becomes more receptive to treatment compliance    Counseling / Coordination of Care    Total floor / unit time spent today 15 minutes  Greater than 50% of total time was spent with the patient and / or family counseling and / or coordination of care  A description of the counseling / coordination of care  Patient's Rights, confidentiality and exceptions to confidentiality, use of automated medical record, [address] [place] [address] staff access to medical record, and consent to treatment reviewed      Allie Guzman MD

## 2019-11-20 NOTE — PLAN OF CARE
Problem: PAIN - ADULT  Goal: Verbalizes/displays adequate comfort level or baseline comfort level  Description  Interventions:  - Encourage patient to monitor pain and request assistance  - Assess pain using appropriate pain scale  - Administer analgesics based on type and severity of pain and evaluate response  - Implement non-pharmacological measures as appropriate and evaluate response  - Consider cultural and social influences on pain and pain management  - Notify physician/advanced practitioner if interventions unsuccessful or patient reports new pain  Outcome: Progressing     Problem: SAFETY ADULT  Goal: Patient will remain free of falls  Description  INTERVENTIONS:  - Assess patient frequently for physical needs  -  Identify cognitive and physical deficits and behaviors that affect risk of falls    -  [place] fall precautions as indicated by assessment   - Educate patient/family on patient safety including physical limitations  - Instruct patient to call for assistance with activity based on assessment  - Modify environment to reduce risk of injury  - Consider OT/PT consult to assist with strengthening/mobility  Outcome: Progressing     Problem: RESPIRATORY - ADULT  Goal: Achieves optimal ventilation and oxygenation  Description  INTERVENTIONS:  - Assess for changes in respiratory status  - Assess for changes in mentation and behavior  - Position to facilitate oxygenation and minimize respiratory effort  - Oxygen administration by appropriate delivery method based on oxygen saturation (per order) or ABGs  - Initiate smoking cessation education as indicated  - Encourage broncho-pulmonary hygiene including cough, deep breathe, Incentive Spirometry  - Assess the need for suctioning and aspirate as needed  - Assess and instruct to report SOB or any respiratory difficulty  - Respiratory Therapy support as indicated  Outcome: Progressing     Problem: SLEEP DISTURBANCE  Goal: Will exhibit normal sleeping pattern  Description  Interventions:  -  Assess the patients sleep pattern, noting recent changes  - Administer medication as ordered  - Decrease environmental stimuli, including noise, as appropriate during the night  - Encourage the patient to actively participate in unit groups and or exercise during the day to enhance ability to achieve adequate sleep at night  - Assess the patient, in the morning, encouraging a description of sleep experience  Outcome: Lillian Gomez maintained on ongoing SAFE precaution without incident on this shift  In bed, eyes closed, breath even and unlabored  On O2 concentrator at 1L/m with humidifier via NC  Q 15 minutes rounding continues  No behavioral noted  Will continue to monitor behavior and sleep pattern

## 2019-11-20 NOTE — PROGRESS NOTES
Patient not eligible to attend      11/20/19 1400   Activity/Group Checklist   Group   ([facility] )   Attendance Did not attend   Attendance Duration (min) 46-60   Affect/Mood IRMA

## 2019-11-20 NOTE — PROGRESS NOTES
Refused carafate this morning  Somatic this morning about her O2 humidifier has been change and check 2 by both nurses for adequate air  She claim she does not need the humidifier and that there was no air coming out of the tube

## 2019-11-20 NOTE — ASSESSMENT & PLAN NOTE
Psychiatry Progress Note  Patient is still passive-aggressive refusing to take showers since last Saturday and refusing to attend any groups claiming that she is sick and frail  She continues to look disheveled and poorly groomed  She does eat most of her meals but then goes back to bed and lays down wearing the same clothing and refusing to change at all  She still accuses some  staff of playing with her medications like her oxygen concentrator at night and her spirometer and inhalers continued to state that she does not trust some of the nurses on the unit   She appears to have fixed delusions about may be a micro chip implanted on her right forearm off and on but sometimes she states it is a lipoma  She believes staff is trying to kill her by asking her to take showers  No current suicidal homicidal thoughts intent or plans reported  No overt hallucinations or other delusions reported other than somatic delusions and paranoid  No signs or sxs of agranulocytosis or myocarditis or endocarditis and she is moving her bowels so far with no issues   She was again reminded to work with the program and work towards going back to the [facility] and start attending groups at least 25% of the time we can look into discharging her back to the personal care home     Current medications:    Current Facility-Administered Medications:     acetaminophen (TYLENOL) tablet 650 mg, 650 mg, Oral, Q6H PRN, Jaz Beasley MD    albuterol (PROVENTIL HFA,VENTOLIN HFA) inhaler 2 puff, 2 puff, Inhalation, Q4H PRN, Jaz Beasley MD, 2 puff at 10/11/19 0424    aluminum-magnesium hydroxide-simethicone (MYLANTA) 200-200-20 mg/5 mL oral suspension 15 mL, 15 mL, Oral, Q4H PRN, Jaz Beasley MD    ammonium lactate (LAC-HYDRIN) 12 % lotion 1 application, 1 application, Topical, BID PRN, Jaz Beasley MD    benzonatate (TESSALON PERLES) capsule 100 mg, 100 mg, Oral, TID PRN, Jaz Beasley MD    benztropine (COGENTIN) injection 1 mg, 1 mg, Intramuscular, Q8H PRN, Sandhya Bolaños MD    cloZAPine (CLOZARIL) tablet 25 mg, 25 mg, Oral, BID, Sandhya Bolaños MD, 25 mg at 11/19/19 2146    cloZAPine (CLOZARIL) tablet 50 mg, 50 mg, Oral, BID, Sandhya Bolaños MD, 50 mg at 11/19/19 2146    EPINEPHrine PF (ADRENALIN) 1 mg/mL injection 0 15 mg, 0 15 mg, Intramuscular, Once PRN, Sandhya Bloaños MD    fluticasone-vilanterol (BREO ELLIPTA) 200-25 MCG/INH inhaler 1 puff, 1 puff, Inhalation, Daily, Sandhya Bolaños MD, 1 puff at 11/20/19 0901    ketotifen (ZADITOR) 0 025 % ophthalmic solution 1 drop, 1 drop, Right Eye, BID PRN, Sandhya Bolaños MD    levothyroxine tablet 125 mcg, 125 mcg, Oral, Early Morning, Sandhya Bolaños MD, 125 mcg at 11/20/19 0600    magnesium hydroxide (MILK OF MAGNESIA) 400 mg/5 mL oral suspension 30 mL, 30 mL, Oral, Daily PRN, Sandhya Bolaños MD    montelukast (SINGULAIR) tablet 10 mg, 10 mg, Oral, HS, Sandhya Bolaños MD, 10 mg at 11/12/19 2141    OLANZapine (ZyPREXA) IM injection 5 mg, 5 mg, Intramuscular, Q8H PRN, Sandhya Bolaños MD    OLANZapine (ZyPREXA) tablet 5 mg, 5 mg, Oral, Q8H PRN, Sandhya Bolaños MD    ondansetron (ZOFRAN-ODT) dispersible tablet 4 mg, 4 mg, Oral, Q6H PRN, Sandhya Bolaños MD, 4 mg at 11/09/19 1223    pantoprazole (PROTONIX) EC tablet 40 mg, 40 mg, Oral, Early Morning, Sandhya Bolaños MD, 40 mg at 11/20/19 0601    polyethylene glycol (MIRALAX) packet 17 g, 17 g, Oral, Daily PRN, Sandhya Bolaños MD    polyvinyl alcohol (LIQUIFILM TEARS) 1 4 % ophthalmic solution 1 drop, 1 drop, Both Eyes, Q3H PRN, Sandhya Bolaños MD    sertraline (ZOLOFT) tablet 50 mg, 50 mg, Oral, Daily, Sandhya Bolaños MD, 50 mg at 11/20/19 0901    sucralfate (CARAFATE) oral suspension 1,000 mg, 1,000 mg, Oral, BID, Cat Torres MD, 1,000 mg at 11/19/19 1619    theophylline (JEF-24) 24 hr capsule 200 mg, 200 mg, Oral, Daily, Sandhya Bolaños MD, 200 mg at 11/20/19 0901    tiotropium (SPIRIVA) capsule for inhaler 18 mcg, 18 mcg, Inhalation, Daily, Sandhya Bolaños MD, 18 mcg at 11/20/19 0901    traZODone (DESYREL) tablet 25 mg, 25 mg, Oral, HS PRN, Ivonne Nevarez MD  Justification if on more than two antipsychotics:  Only on his clozapine  Side effects if any:  None    Risks , benefits, side effects and precautions of medications discussed with patient and reminded patient to let us know any problems with medications     Objective:     Vital Signs:  Vitals:    11/19/19 1640 11/19/19 2041 11/20/19 0700 11/20/19 0705   BP: 94/55 117/81 102/63 90/51   BP Location: Left arm Right arm Left arm Left arm   Pulse: 69 91 77 63   Resp: 16 16 18 18   Temp: 97 8 °F (36 6 °C) (!) 97 2 °F (36 2 °C) 97 7 °F (36 5 °C)    TempSrc: Temporal Temporal Temporal    SpO2: 92% 95% 90%    Weight:       Height:         Appearance:  age appropriate, casually dressed and overweight older than stated age, uncombed long grey hair, poorly groomed and unkempt disheveled with a bad body order   Behavior:  evasive and guarded and suspicious  and preoccupied psychosomatic and argumentative and polite when approached but suspicious and demanding   Speech:  normal pitch and normal volume but circumstantial and tangential with stilted speech off and on   Mood:  anxious and dysthymic   Affect:  mood-congruent, elated and entitled and irritated    Thought Process:  goal directed and illogical slightly pressured and tangential and talks as if in a court of law   Thought Content:  delusions  grandiose and somatic  about not being able to breathe and paranoid about people out to harm her and kill her asking her to take showers, and believing they are messing with her medications  No current suicidal homicidal thoughts intent or plans reported  No phobias obsessions or compulsions reported    Somatic delusions about having cancer or terrible illnesses like or having a device implanted on her right forearm, her having low sugar and having swallowing difficulty or having breathing difficulty and staff trying to kill her Perceptual Disturbances: None and does not appear responding to internal stimuli at times   Risk Potential: Tendency to not care for herself if she goes off treatment   Sensorium:  person, place, time/date, situation, day of week, month of year and time   Cognition:  grossly intact   Consciousness:  alert and awake    Attention: Intact concentration and attention span   Intellect: Considered to be at least of average intelligence   Insight:  limited and in denial of her illness   Judgment: poor      Motor Activity: no abnormal movements         Recent Labs:  Results Reviewed     None          I/O Past 24 hours:  No intake/output data recorded  No intake/output data recorded  Assessment / Plan:     [facility]ffective [facility]      Reason for continued inpatient care:  Because of underlying paranoia and refusal to go back to the personal care home and inability to care for herself on her own  Acceptance by patient:  Accepting  Audelia Arthur in recovery:  About living in another personal care home once she feels better  Understanding of medications :  Has some understanding  Involved in reintegration process:  Adjusting to the unit  Trusting in relatoinship with psychiatrist:  Trusting    Recommended Treatment:    Medication changes:  1) no changes today and refuses to increase zoloft when suggested  Non-pharmacological treatments  1) continue with  individual therapy group therapy, milieu therapy and occupational therapy and milieu therapy involving multidisciplinary team approach with psychotherapist, case management, nursing, peer support services, art therapist etc using recovery principles and psycho-education about accepting illness and need for treatment   3) encourage to attend to ADLs like taking showers and wearing clean clothes   4) Encourage to attend groups   Safety  1) Safety/communication plan established targeting dynamic risk factors above    Discharge Plan most likely back to the a:  Personal care boarding home with act services once her delusions are managed and mood becomes more stabilized and she becomes more receptive to treatment compliance    Counseling / Coordination of Care    Total floor / unit time spent today 15 minutes  Greater than 50% of total time was spent with the patient and / or family counseling and / or coordination of care  A description of the counseling / coordination of care  Patient's Rights, confidentiality and exceptions to confidentiality, use of automated medical record, [facility] staff access to medical record, and consent to treatment reviewed      Jerome Lopez MD

## 2019-11-20 NOTE — PROGRESS NOTES
11/19/19 1100   Activity/Group Checklist   Group Other (Comment)  ([facility] - Graduation)   Attendance Did not attend     Patient was encouraged to attend group via personal prompt  Patient acknowledged this writer with her back to her stating she would not be coming

## 2019-11-21 NOTE — PLAN OF CARE
Problem: PAIN - ADULT  Goal: Verbalizes/displays adequate comfort level or baseline comfort level  Description  Interventions:  - Encourage patient to monitor pain and request assistance  - Assess pain using appropriate pain scale  - Administer analgesics based on type and severity of pain and evaluate response  - Implement non-pharmacological measures as appropriate and evaluate response  - Consider cultural and social influences on pain and pain management  - Notify physician/advanced practitioner if interventions unsuccessful or patient reports new pain  Outcome: Progressing     Problem: SAFETY ADULT  Goal: Patient will remain free of falls  Description  INTERVENTIONS:  - Assess patient frequently for physical needs  -  Identify cognitive and physical deficits and behaviors that affect risk of falls    -  [place] fall precautions as indicated by assessment   - Educate patient/family on patient safety including physical limitations  - Instruct patient to call for assistance with activity based on assessment  - Modify environment to reduce risk of injury  - Consider OT/PT consult to assist with strengthening/mobility  Outcome: Progressing     Problem: RESPIRATORY - ADULT  Goal: Achieves optimal ventilation and oxygenation  Description  INTERVENTIONS:  - Assess for changes in respiratory status  - Assess for changes in mentation and behavior  - Position to facilitate oxygenation and minimize respiratory effort  - Oxygen administration by appropriate delivery method based on oxygen saturation (per order) or ABGs  - Initiate smoking cessation education as indicated  - Encourage broncho-pulmonary hygiene including cough, deep breathe, Incentive Spirometry  - Assess the need for suctioning and aspirate as needed  - Assess and instruct to report SOB or any respiratory difficulty  - Respiratory Therapy support as indicated  Outcome: Progressing     Problem: SLEEP DISTURBANCE  Goal: Will exhibit normal sleeping pattern  Description  Interventions:  -  Assess the patients sleep pattern, noting recent changes  - Administer medication as ordered  - Decrease environmental stimuli, including noise, as appropriate during the night  - Encourage the patient to actively participate in unit groups and or exercise during the day to enhance ability to achieve adequate sleep at night  - Assess the patient, in the morning, encouraging a description of sleep experience  Outcome: Paulina Tanner maintained on ongoing SAFE precaution without incident on this shift  In bed, eyes closed, breath even and unlabored  On O2 concentrator at 1L/m with humidifier via NC  Q 15 minutes rounding continues  No behavioral noted  Will continue to monitor behavior and sleep pattern

## 2019-11-21 NOTE — PROGRESS NOTES
Patient not required to attend      11/19/19 2926   Activity/Group Checklist   Group   (Community Programming Wrap Up )   Attendance Did not attend   Attendance Duration (min) 16-30   Affect/Mood [facility]

## 2019-11-21 NOTE — ASSESSMENT & PLAN NOTE
Psychiatry Progress Note  Patient i continues to look disheveled and poorly groomed refusing showers or attend any groups laying back in bed and refusing to comply with the behavior plan or any reminders to take showers  She questions the motives of several staff members and has been paranoid about the staff and believes that some may be trying to kill her by forcing her to take showers  She appears to have fixed delusions about may be a micro chip implanted on her right forearm off and on but sometimes she states it is a lipoma  No current suicidal homicidal thoughts intent or plans reported  No overt hallucinations or other delusions reported other than somatic delusions and paranoid  No signs or sxs of agranulocytosis or myocarditis or endocarditis and she is moving her bowels so far with no issues   She was again reminded to work with the program and work towards going back to the [facility] and start attending groups at least 25% of the time we can look into discharging her back to the personal care home but she remains passive-aggressive and does not do anything to help herself or increase group attendance or attend to her ADLs on her even with reminders     Current medications:    Current Facility-Administered Medications:     acetaminophen (TYLENOL) tablet 650 mg, 650 mg, Oral, Q6H PRN, Allie Guzman MD    albuterol (PROVENTIL HFA,VENTOLIN HFA) inhaler 2 puff, 2 puff, Inhalation, Q4H PRN, Allie Guzman MD, 2 puff at 10/11/19 0424    aluminum-magnesium hydroxide-simethicone (MYLANTA) 200-200-20 mg/5 mL oral suspension 15 mL, 15 mL, Oral, Q4H PRN, Allie Guzman MD    ammonium lactate (LAC-HYDRIN) 12 % lotion 1 application, 1 application, Topical, BID PRN, Allie Guzman MD    benzonatate (TESSALON PERLES) capsule 100 mg, 100 mg, Oral, TID PRN, Allie Guzman MD    benztropine (COGENTIN) injection 1 mg, 1 mg, Intramuscular, Q8H PRN, Allie Guzman MD    cloZAPine (CLOZARIL) tablet 25 mg, 25 mg, Oral, BID, Jerome Lopez MD, 25 mg at 11/20/19 2128    cloZAPine (CLOZARIL) tablet 50 mg, 50 mg, Oral, BID, Jerome Lopez MD, 50 mg at 11/20/19 2128    EPINEPHrine PF (ADRENALIN) 1 mg/mL injection 0 15 mg, 0 15 mg, Intramuscular, Once PRN, Jerome Lopez MD    fluticasone-vilanterol (BREO ELLIPTA) 200-25 MCG/INH inhaler 1 puff, 1 puff, Inhalation, Daily, Jerome Lopez MD, 1 puff at 11/21/19 0851    ketotifen (ZADITOR) 0 025 % ophthalmic solution 1 drop, 1 drop, Right Eye, BID PRN, Jerome Lopez MD    levothyroxine tablet 125 mcg, 125 mcg, Oral, Early Morning, Jerome Lopez MD, 125 mcg at 11/21/19 0603    magnesium hydroxide (MILK OF MAGNESIA) 400 mg/5 mL oral suspension 30 mL, 30 mL, Oral, Daily PRN, Jerome Lopez MD    montelukast (SINGULAIR) tablet 10 mg, 10 mg, Oral, HS, Jerome Lopez MD, 10 mg at 11/12/19 2141    OLANZapine (ZyPREXA) IM injection 5 mg, 5 mg, Intramuscular, Q8H PRN, Jerome Lopez MD    OLANZapine (ZyPREXA) tablet 5 mg, 5 mg, Oral, Q8H PRN, Jerome Lopez MD    ondansetron (ZOFRAN-ODT) dispersible tablet 4 mg, 4 mg, Oral, Q6H PRN, Jerome Lopez MD, 4 mg at 11/09/19 1223    pantoprazole (PROTONIX) EC tablet 40 mg, 40 mg, Oral, Early Morning, Jerome Lopez MD, 40 mg at 11/21/19 0603    polyethylene glycol (MIRALAX) packet 17 g, 17 g, Oral, Daily PRN, Jerome Lopez MD    polyvinyl alcohol (LIQUIFILM TEARS) 1 4 % ophthalmic solution 1 drop, 1 drop, Both Eyes, Q3H PRN, Jerome Lopez MD    sertraline (ZOLOFT) tablet 50 mg, 50 mg, Oral, Daily, Jerome Lopez MD, 50 mg at 11/21/19 0851    sucralfate (CARAFATE) oral suspension 1,000 mg, 1,000 mg, Oral, BID, Cat Torres MD, 1,000 mg at 11/20/19 1623    theophylline (JEF-24) 24 hr capsule 200 mg, 200 mg, Oral, Daily, Jerome Lopez MD, 200 mg at 11/21/19 0851    tiotropium (SPIRIVA) capsule for inhaler 18 mcg, 18 mcg, Inhalation, Daily, Jerome Lopez MD, 18 mcg at 11/21/19 0851    traZODone (DESYREL) tablet 25 mg, 25 mg, Oral, HS PRN, Jerome Lopez, MD  Justification if on more than two antipsychotics:  Only on his clozapine  Side effects if any:  None    Risks , benefits, side effects and precautions of medications discussed with patient and reminded patient to let us know any problems with medications     Objective:     Vital Signs:  Vitals:    11/20/19 1600 11/20/19 2000 11/21/19 0730 11/21/19 0732   BP: 121/78 98/66 97/64 (!) 80/59   BP Location: Left arm Left arm Left arm Left arm   Pulse: 94 76 76 65   Resp: 18 17 16    Temp: (!) 97 3 °F (36 3 °C) (!) 97 °F (36 1 °C) 97 8 °F (36 6 °C)    TempSrc: Temporal Temporal Temporal    SpO2: 95% 96%     Weight:       Height:         Appearance:  age appropriate, casually dressed and overweight older than stated age, uncombed long grey hair, poorly groomed and unkempt disheveled    Behavior:  evasive and guarded and suspicious  and preoccupied psychosomatic and argumentative and polite when approached but suspicious and paranoid   Speech:  normal pitch and normal volume but circumstantial and tangential with stilted speech    Mood:  anxious and dysthymic   Affect:  mood-congruent, elated and entitled and irritated off and   Thought Process:  goal directed and illogical slightly pressured and tangential and talks as if in a court of law at times   Thought Content:  delusions  grandiose and somatic  about not being able to breathe and paranoid about people out to harm her and kill her asking her to take showers, her not being able to swallow or breathe or having a terrible illness and dying from cancer and believing the staff are messing with her medications  No current suicidal homicidal thoughts intent or plans reported  No phobias obsessions or compulsions reported    Also believes that she has hypoglycemia and demands to check her blood sugar for no reason   Perceptual Disturbances: None and does not appear responding to internal stimuli at times   Risk Potential: Tendency to not care for herself if she goes off treatment   Sensorium:  person, place, time/date, situation, day of week, month of year and time   Cognition:  grossly intact   Consciousness:  alert and awake    Attention: Intact concentration and attention span   Intellect: Considered to be at least of average intelligence   Insight:  limited and in denial of her illness   Judgment: poor      Motor Activity: no abnormal movements         Recent Labs:  Results Reviewed     None          I/O Past 24 hours:  No intake/output data recorded  No intake/output data recorded  Assessment / Plan:     [facility]ffective [facility]      Reason for continued inpatient care:  Because of underlying paranoia and refusal to go back to the personal care home and inability to care for herself on her own  Acceptance by patient:  Accepting  Nithin Serra in recovery:  About living in another personal care home once she feels better  Understanding of medications :  Has some understanding  Involved in reintegration process:  Adjusting to the unit  Trusting in relatoinship with psychiatrist:  Trusting    Recommended Treatment:    Medication changes:  1) no changes today and refuses to increase zoloft when suggested  Non-pharmacological treatments  1) continue with  individual therapy group therapy, milieu therapy and occupational therapy and milieu therapy involving multidisciplinary team approach with psychotherapist, case management, nursing, peer support services, art therapist etc using recovery principles and psycho-education about accepting illness and need for treatment   3) encourage to attend to ADLs like taking showers and wearing clean clothes   4) Encourage to attend groups   Safety  1) Safety/communication plan established targeting dynamic risk factors above    Discharge Plan most likely back to the a:  Personal care boarding home with act services once her delusions are managed and mood becomes more stabilized and she becomes more receptive to treatment compliance    Counseling / Coordination of Care    Total floor / unit time spent today 15 minutes  Greater than 50% of total time was spent with the patient and / or family counseling and / or coordination of care  A description of the counseling / coordination of care  Patient's Rights, confidentiality and exceptions to confidentiality, use of automated medical record, [facility] staff access to medical record, and consent to treatment reviewed      Deedee Muir MD

## 2019-11-21 NOTE — PROGRESS NOTES
Progress Note - Nina Bello 1955, 60 y o  female MRN: [id number]    Unit/Bed#: [facility] 110-02 Encounter: [id number]    Primary Care Provider: Kendrick Schultz PA-C   Date and time admitted to hospital: 7/23/2019  5:30 PM        * Schizoaffective disorder, bipolar type [facility])  Assessment & Plan  Psychiatry Progress Note  Patient i continues to look disheveled and poorly groomed refusing showers or attend any groups laying back in bed and refusing to comply with the behavior plan or any reminders to take showers  She questions the motives of several staff members and has been paranoid about the staff and believes that some may be trying to kill her by forcing her to take showers  She appears to have fixed delusions about may be a micro chip implanted on her right forearm off and on but sometimes she states it is a lipoma  No current suicidal homicidal thoughts intent or plans reported  No overt hallucinations or other delusions reported other than somatic delusions and paranoid  No signs or sxs of agranulocytosis or myocarditis or endocarditis and she is moving her bowels so far with no issues   She was again reminded to work with the program and work towards going back to the [facility] and start attending groups at least 25% of the time we can look into discharging her back to the personal care home but she remains passive-aggressive and does not do anything to help herself or increase group attendance or attend to her ADLs on her even with reminders     Current medications:    Current Facility-Administered Medications:     acetaminophen (TYLENOL) tablet 650 mg, 650 mg, Oral, Q6H PRN, Jill Gayle MD    albuterol (PROVENTIL HFA,VENTOLIN HFA) inhaler 2 puff, 2 puff, Inhalation, Q4H PRN, Jill Gayle MD, 2 puff at 10/11/19 0424    aluminum-magnesium hydroxide-simethicone (MYLANTA) 200-200-20 mg/5 mL oral suspension 15 mL, 15 mL, Oral, Q4H PRN, Jill Gayle MD    ammonium lactate (LAC-HYDRIN) 12 % lotion 1 application, 1 application, Topical, BID PRN, Sarah Hanna MD    benzonatate (TESSALON PERLES) capsule 100 mg, 100 mg, Oral, TID PRN, Sarah Hanna MD    benztropine (COGENTIN) injection 1 mg, 1 mg, Intramuscular, Q8H PRN, Sarah Hanna MD    cloZAPine (CLOZARIL) tablet 25 mg, 25 mg, Oral, BID, Sarah Hanna MD, 25 mg at 11/20/19 2128    cloZAPine (CLOZARIL) tablet 50 mg, 50 mg, Oral, BID, Sarah Hanna MD, 50 mg at 11/20/19 2128    EPINEPHrine PF (ADRENALIN) 1 mg/mL injection 0 15 mg, 0 15 mg, Intramuscular, Once PRN, Sarah Hanna MD    fluticasone-vilanterol (BREO ELLIPTA) 200-25 MCG/INH inhaler 1 puff, 1 puff, Inhalation, Daily, Sarah Hanna MD, 1 puff at 11/21/19 0851    ketotifen (ZADITOR) 0 025 % ophthalmic solution 1 drop, 1 drop, Right Eye, BID PRN, Sarah Hanna MD    levothyroxine tablet 125 mcg, 125 mcg, Oral, Early Morning, Sarah Hanna MD, 125 mcg at 11/21/19 0603    magnesium hydroxide (MILK OF MAGNESIA) 400 mg/5 mL oral suspension 30 mL, 30 mL, Oral, Daily PRN, Sarah Hanna MD    montelukast (SINGULAIR) tablet 10 mg, 10 mg, Oral, HS, Sarah Hanna MD, 10 mg at 11/12/19 2141    OLANZapine (ZyPREXA) IM injection 5 mg, 5 mg, Intramuscular, Q8H PRN, Sarah Hanna MD    OLANZapine (ZyPREXA) tablet 5 mg, 5 mg, Oral, Q8H PRN, Sarah Hanna MD    ondansetron (ZOFRAN-ODT) dispersible tablet 4 mg, 4 mg, Oral, Q6H PRN, Sarah Hanna MD, 4 mg at 11/09/19 1223    pantoprazole (PROTONIX) EC tablet 40 mg, 40 mg, Oral, Early Morning, Sarha Hanna MD, 40 mg at 11/21/19 0603    polyethylene glycol (MIRALAX) packet 17 g, 17 g, Oral, Daily PRN, Sarah Hanna MD    polyvinyl alcohol (LIQUIFILM TEARS) 1 4 % ophthalmic solution 1 drop, 1 drop, Both Eyes, Q3H PRN, Sarah Hanna MD    sertraline (ZOLOFT) tablet 50 mg, 50 mg, Oral, Daily, Sarah Hanna MD, 50 mg at 11/21/19 0851    sucralfate (CARAFATE) oral suspension 1,000 mg, 1,000 mg, Oral, BID, Cat Torres MD, 1,000 mg at 11/20/19 1623    theophylline (JEF-24) 24 hr capsule 200 mg, 200 mg, Oral, Daily, Krystyna Mcclain MD, 200 mg at 11/21/19 0851    tiotropium (SPIRIVA) capsule for inhaler 18 mcg, 18 mcg, Inhalation, Daily, Krystyna Mcclain MD, 18 mcg at 11/21/19 0851    traZODone (DESYREL) tablet 25 mg, 25 mg, Oral, HS PRN, Krystyna Mcclain MD  Justification if on more than two antipsychotics:  Only on his clozapine  Side effects if any:  None    Risks , benefits, side effects and precautions of medications discussed with patient and reminded patient to let us know any problems with medications     Objective:     Vital Signs:  Vitals:    11/20/19 1600 11/20/19 2000 11/21/19 0730 11/21/19 0732   BP: 121/78 98/66 97/64 (!) 80/59   BP Location: Left arm Left arm Left arm Left arm   Pulse: 94 76 76 65   Resp: 18 17 16    Temp: (!) 97 3 °F (36 3 °C) (!) 97 °F (36 1 °C) 97 8 °F (36 6 °C)    TempSrc: Temporal Temporal Temporal    SpO2: 95% 96%     Weight:       Height:         Appearance:  age appropriate, casually dressed and overweight older than stated age, uncombed long grey hair, poorly groomed and unkempt disheveled    Behavior:  evasive and guarded and suspicious  and preoccupied psychosomatic and argumentative and polite when approached but suspicious and paranoid   Speech:  normal pitch and normal volume but circumstantial and tangential with stilted speech    Mood:  anxious and dysthymic   Affect:  mood-congruent, elated and entitled and irritated off and   Thought Process:  goal directed and illogical slightly pressured and tangential and talks as if in a court of law at times   Thought Content:  delusions  grandiose and somatic  about not being able to breathe and paranoid about people out to harm her and kill her asking her to take showers, her not being able to swallow or breathe or having a terrible illness and dying from cancer and believing the staff are messing with her medications    No current suicidal homicidal thoughts intent or plans reported  No phobias obsessions or compulsions reported  Also believes that she has hypoglycemia and demands to check her blood sugar for no reason   Perceptual Disturbances: None and does not appear responding to internal stimuli at times   Risk Potential: Tendency to not care for herself if she goes off treatment   Sensorium:  person, place, time/date, situation, day of week, month of year and time   Cognition:  grossly intact   Consciousness:  alert and awake    Attention: Intact concentration and attention span   Intellect: Considered to be at least of average intelligence   Insight:  limited and in denial of her illness   Judgment: poor      Motor Activity: no abnormal movements         Recent Labs:  Results Reviewed     None          I/O Past 24 hours:  No intake/output data recorded  No intake/output data recorded  Assessment / Plan:     [facility]ffective disorder[facility]      Reason for continued inpatient care:  Because of underlying paranoia and refusal to go back to the personal care home and inability to care for herself on her own  Acceptance by patient:  Accepting  Colette Castrejon in recovery:  About living in another personal care home once she feels better  Understanding of medications :  Has some understanding  Involved in reintegration process:  Adjusting to the unit  Trusting in relatoinship with psychiatrist:  Trusting    Recommended Treatment:    Medication changes:  1) no changes today and refuses to increase zoloft when suggested  Non-pharmacological treatments  1) continue with  individual therapy group therapy, milieu therapy and occupational therapy and milieu therapy involving multidisciplinary team approach with psychotherapist, case management, nursing, peer support services, art therapist etc using recovery principles and psycho-education about accepting illness and need for treatment     3) encourage to attend to ADLs like taking showers and wearing clean clothes   4) Encourage to attend groups   Safety  1) Safety/communication plan established targeting dynamic risk factors above  Discharge Plan most likely back to the a:  Personal care boarding home with act services once her delusions are managed and mood becomes more stabilized and she becomes more receptive to treatment compliance    Counseling / Coordination of Care    Total floor / unit time spent today 15 minutes  Greater than 50% of total time was spent with the patient and / or family counseling and / or coordination of care  A description of the counseling / coordination of care  Patient's Rights, confidentiality and exceptions to confidentiality, use of automated medical record, [facility] staff access to medical record, and consent to treatment reviewed      Sarah Hanna MD

## 2019-11-21 NOTE — PLAN OF CARE
Problem: Alteration in Thoughts and Perception  Goal: Verbalize thoughts and feelings  Description  Interventions:  - Promote a nonjudgmental and trusting relationship with the patient through active listening and therapeutic communication  - Assess patient's level of functioning, behavior and potential for risk  - Engage patient in 1 on 1 interactions for a minimum of 15 minutes each session  - Encourage patient to express fears, feelings, frustrations, and discuss symptoms    - [place] patient to reality, help patient recognize reality-based thinking   - Administer medications as ordered and assess for potential side effects  - Provide the patient education related to the signs and symptoms of the illness and desired effects of prescribed medications  Outcome: Progressing  Goal: Agree to be compliant with medication regime, as prescribed and report medication side effects  Description  Interventions:  - Offer appropriate PRN medication and supervise ingestion; conduct aims, as needed   Outcome: Progressing  Goal: Attend and participate in unit activities, including therapeutic, recreational, and educational groups  Description  Interventions:  - Provide therapeutic and educational activities daily, encourage attendance and participation, and document same in the medical record     CERTIFIED PEER SPECIALIST INTERVENTIONS:    Complete peer assessment with patient to assess their needs and identify their goals to complete while in the recovery program as well as once discharged into the community  Patient will complete WRAP Plan, Crisis Plan and 5 Life Domains  Patient will attend 50% of groups offered on the unit  Patient will complete a goal card weekly      Outcome: Progressing     Problem: Alteration in Thoughts and Perception  Goal: Complete daily ADLs, including personal hygiene independently, as able  Description  Interventions:  - Observe, teach, and assist patient with ADLS  - Monitor and promote a balance of rest/activity, with adequate nutrition and elimination   Outcome: Not Progressing     2200 Natalie Lopes was out of room for meal, scheduled medicine, HS snack  She also attended the Movie Social w/enjoyment  For meal had 30%; juice, sherbet, milk, coffee/not solids  For HS snack had 2 yogurts  Insists still that having difficulty swallowing, things getting stuck in throat  Took all scheduled medicine except the Singulair  Used the [employer] reaching volumes of 1000-1250ml  Wearing now her QHS humidified nasal O2 @ 1L  Did not address hygiene tonight

## 2019-11-21 NOTE — PROGRESS NOTES
Patient attended/Participated in group activity/ Brighter affect      11/21/19 1100   Activity/Group Checklist   Group   (IMR/Recovery Pictionary )   Attendance Attended   Attendance Duration (min) 46-60   Interactions Interacted appropriately   Affect/Mood Appropriate;Normal range;Blunted/flat   Goals Achieved Identified feelings; Able to listen to others; Able to engage in interactions

## 2019-11-21 NOTE — PROGRESS NOTES
11/21/19 0900   Team Meeting   Meeting Type Daily Rounds   Team Members Present   Team Members Present Physician;Nurse;; Other (Discipline and Name)   Physician Team Member Dr Wally Jaramillo Team Member Mary Hooper RN   Care Management Team Member Brenda Oliveros   Other (Discipline and Name) Estefania Miller, JERW; Bonilla Ulloa, CPS     No changes in her behaviors  Slept

## 2019-11-21 NOTE — PLAN OF CARE
Problem: Alteration in Thoughts and Perception  Goal: Verbalize thoughts and feelings  Description  Interventions:  - Promote a nonjudgmental and trusting relationship with the patient through active listening and therapeutic communication  - Assess patient's level of functioning, behavior and potential for risk  - Engage patient in 1 on 1 interactions for a minimum of 15 minutes each session  - Encourage patient to express fears, feelings, frustrations, and discuss symptoms    - [place] patient to reality, help patient recognize reality-based thinking   - Administer medications as ordered and assess for potential side effects  - Provide the patient education related to the signs and symptoms of the illness and desired effects of prescribed medications  Outcome: Progressing  Goal: Agree to be compliant with medication regime, as prescribed and report medication side effects  Description  Interventions:  - Offer appropriate PRN medication and supervise ingestion; conduct aims, as needed   Outcome: Progressing  Goal: Attend and participate in unit activities, including therapeutic, recreational, and educational groups  Description  Interventions:  - Provide therapeutic and educational activities daily, encourage attendance and participation, and document same in the medical record     CERTIFIED PEER SPECIALIST INTERVENTIONS:    Complete peer assessment with patient to assess their needs and identify their goals to complete while in the recovery program as well as once discharged into the community  Patient will complete WRAP Plan, Crisis Plan and 5 Life Domains  Patient will attend 50% of groups offered on the unit  Patient will complete a goal card weekly      Outcome: Progressing     Problem: Ineffective Coping  Goal: Participates in unit activities  Description  Interventions:  - Provide therapeutic environment   - Provide required programming   - Redirect inappropriate behaviors   Outcome: Progressing  Goal: Patient/Family verbalizes awareness of resources  Outcome: Progressing  Goal: Understands least restrictive measures  Description  Interventions:  - Utilize least restrictive behavior  Outcome: Progressing     Problem: Risk for Self Injury/Neglect  Goal: Treatment Goal: Remain safe during length of stay, learn and adopt new coping skills, and be free of self-injurious ideation, impulses and acts at the time of discharge  Outcome: Progressing  Goal: Verbalize thoughts and feelings  Description  Interventions:  - Assess and re-assess patient's lethality and potential for self-injury  - Engage patient in 1:1 interactions, daily, for a minimum of 15 minutes  - Encourage patient to express feelings, fears, frustrations, hopes  - Establish rapport/trust with patient   Outcome: Progressing  Goal: Refrain from harming self  Description  Interventions:  - Monitor patient closely, per order  - Develop a trusting relationship  - Supervise medication ingestion, monitor effects and side effects   Outcome: Progressing  Goal: Attend and participate in unit activities, including therapeutic, recreational, and educational groups  Description  Interventions:  - Provide therapeutic and educational activities daily, encourage attendance and participation, and document same in the medical record  - Obtain collateral information, encourage visitation and family involvement in care   Outcome: Progressing     Problem: Depression  Goal: Verbalize thoughts and feelings  Description  Interventions:  - Assess and re-assess patient's level of risk   - Engage patient in 1:1 interactions, daily, for a minimum of 15 minutes   - Encourage patient to express feelings, fears, frustrations, hopes   Outcome: Progressing     Problem: Anxiety  Goal: Anxiety is at manageable level  Description  Interventions:  - Assess and monitor patient's anxiety level     - Monitor for signs and symptoms of anxiety both physical and emotional (heart palpitations, chest pain, shortness of breath, headaches, nausea, feeling jumpy, restlessness, irritable, apprehensive)  - Collaborate with interdisciplinary team and initiate plan and interventions as ordered  - [place] patient to unit/surroundings  - Explain treatment plan  - Encourage participation in care  - Encourage verbalization of concerns/fears  - Identify coping mechanisms  - Assist in developing anxiety-reducing skills  - Administer/offer alternative therapies  - Limit or eliminate stimulants  Outcome: Progressing     Problem: Alteration in Orientation  Goal: Interact with staff daily  Description  Interventions:  - Assess and re-assess patient's level of orientation  - Engage patient in 1 on 1 interactions, daily, for a minimum of 15 minutes   - Establish rapport/trust with patient   Outcome: Progressing     Problem: PAIN - ADULT  Goal: Verbalizes/displays adequate comfort level or baseline comfort level  Description  Interventions:  - Encourage patient to monitor pain and request assistance  - Assess pain using appropriate pain scale  - Administer analgesics based on type and severity of pain and evaluate response  - Implement non-pharmacological measures as appropriate and evaluate response  - Consider cultural and social influences on pain and pain management  - Notify physician/advanced practitioner if interventions unsuccessful or patient reports new pain  Outcome: Progressing     Problem: SAFETY ADULT  Goal: Patient will remain free of falls  Description  INTERVENTIONS:  - Assess patient frequently for physical needs  -  Identify cognitive and physical deficits and behaviors that affect risk of falls    -  [place] fall precautions as indicated by assessment   - Educate patient/family on patient safety including physical limitations  - Instruct patient to call for assistance with activity based on assessment  - Modify environment to reduce risk of injury  - Consider OT/PT consult to assist with strengthening/mobility  Outcome: Progressing     Problem: Alteration in Thoughts and Perception  Goal: Treatment Goal: Gain control of psychotic behaviors/thinking, reduce/eliminate presenting symptoms and demonstrate improved reality functioning upon discharge  Outcome: Not Progressing  Goal: Recognize dysfunctional thoughts, communicate reality-based thoughts at the time of discharge  Description  Interventions:  - Provide medication and psycho-education to assist patient in compliance and developing insight into his/her illness   Outcome: Not Progressing  Goal: Complete daily ADLs, including personal hygiene independently, as able  Description  Interventions:  - Observe, teach, and assist patient with ADLS  - Monitor and promote a balance of rest/activity, with adequate nutrition and elimination   Outcome: Not Progressing     Problem: Risk for Self Injury/Neglect  Goal: Recognize maladaptive responses and adopt new coping mechanisms  Outcome: Not Progressing  Goal: Complete daily ADLs, including personal hygiene independently, as able  Description  Interventions:  - Observe, teach, and assist patient with ADLS  - Monitor and promote a balance of rest/activity, with adequate nutrition and elimination  Outcome: Not Progressing     Problem: Depression  Goal: Treatment Goal: Demonstrate behavioral control of depressive symptoms, verbalize feelings of improved mood/affect, and adopt new coping skills prior to discharge  Outcome: Not Progressing  Goal: Refrain from isolation  Description  Interventions:  - Develop a trusting relationship   - Encourage socialization   Outcome: Not Progressing  Goal: Refrain from self-neglect  Outcome: Not Progressing     Problem: RESPIRATORY - ADULT  Goal: Achieves optimal ventilation and oxygenation  Description  INTERVENTIONS:  - Assess for changes in respiratory status  - Assess for changes in mentation and behavior  - Position to facilitate oxygenation and minimize respiratory effort  - Oxygen administration by appropriate delivery method based on oxygen saturation (per order) or ABGs  - Initiate smoking cessation education as indicated  - Encourage broncho-pulmonary hygiene including cough, deep breathe, Incentive Spirometry  - Assess the need for suctioning and aspirate as needed  - Assess and instruct to report SOB or any respiratory difficulty  - Respiratory Therapy support as indicated  Outcome: Not Progressing     Problem: Nutrition/Hydration-ADULT  Goal: Nutrient/Hydration intake appropriate for improving, restoring or maintaining nutritional needs  Description  Monitor and assess patient's nutrition/hydration status for malnutrition  Collaborate with interdisciplinary team and initiate plan and interventions as ordered  Monitor patient's weight and dietary intake as ordered or per policy  Utilize nutrition screening tool and intervene as necessary  Determine patient's food preferences and provide high-protein, high-caloric foods as appropriate  INTERVENTIONS:  - Monitor oral intake, urinary output, labs, and treatment plans  - Assess nutrition and hydration status and recommend course of action  - Evaluate amount of meals eaten  - Assist patient with eating if necessary   - Allow adequate time for meals  - Recommend/ encourage appropriate diets, oral nutritional supplements, and vitamin/mineral supplements  - Order, calculate, and assess calorie counts as needed  - Recommend, monitor, and adjust tube feedings and TPN/PPN based on assessed needs  - Assess need for intravenous fluids  - Provide specific nutrition/hydration education as appropriate  - Include patient/family/caregiver in decisions related to nutrition  Outcome: Not Progressing   The patient has been mostly isolative to her room, laying in her bed but did come out for meds and meals and attended [facility] group  Somatic at times saying that she feels like she's "light headed" and going to "pass out"   Concerned about her blood pressure and was reminded to drink water  Blood pressure was re-taken in the afternoon and found to be WNL  She also did not eat not eat well, having only 50% of her breakfast and did not eat lunch  She said that she "couldn't swallow the turkey" but did not attempt to eat any of the other items on her tray either  Refused to use the incentive spirometer and continues to refuse to shower or do hygiene  Staff attempted to educate the patient multiple times throughout the day but she continues to not listen to staff or doctor recommendations about maintaining proper health  No other behaviors or issues noted  Continue to monitor

## 2019-11-22 NOTE — ASSESSMENT & PLAN NOTE
Psychiatry Progress Note  Patient is difficult to be convinced that she needs to get up and do things and claims that she did finally attend 2 groups yesterday but has not taken a shower since last Saturday for the last 6 days in a row  He is still disheveled uncombed and does not adhere to the behavior plan  She continues to verbalize paranoia about certain staff tampering with her medications and forcing her to take showers and she believe she is going to die if she takes a shower  She does admit that she has psychosomatic symptoms and continues to complain about fear of not able to swallow or breathe and having some terrible illness like cancer that we are not telling her etc   No current suicidal homicidal thoughts intent or plans reported  No overt hallucinations or other delusions reported other than somatic delusions and paranoid  No signs or sxs of agranulocytosis or myocarditis or endocarditis and she is moving her bowels so far with no issues   She was again reminded to work with the program and work towards going back to the [facility] and start attending groups at least 25% of the time we can look into discharging her back to the personal care home but she remains passive-aggressive and does not do anything to help herself or increase group attendance or attend to her ADLs on her even with reminders  Still refusing to make any further changes in her medications like increase in Zoloft but since QTC interval was 482 am not inclined to increasing the clozapine further she does not agree to any other medication changes offered at this time     Current medications:    Current Facility-Administered Medications:     acetaminophen (TYLENOL) tablet 650 mg, 650 mg, Oral, Q6H PRN, Tree Madden MD    albuterol (PROVENTIL HFA,VENTOLIN HFA) inhaler 2 puff, 2 puff, Inhalation, Q4H PRN, Tree Madden MD, 2 puff at 10/11/19 0424    aluminum-magnesium hydroxide-simethicone (MYLANTA) 200-200-20 mg/5 mL oral suspension 15 mL, 15 mL, Oral, Q4H PRN, Deedee Muir MD    ammonium lactate (LAC-HYDRIN) 12 % lotion 1 application, 1 application, Topical, BID PRN, Deedee Muir MD    benzonatate (TESSALON PERLES) capsule 100 mg, 100 mg, Oral, TID PRN, Deedee Muir MD    benztropine (COGENTIN) injection 1 mg, 1 mg, Intramuscular, Q8H PRN, Deedee Muir MD    cloZAPine (CLOZARIL) tablet 25 mg, 25 mg, Oral, BID, Deedee Muir MD, 25 mg at 11/21/19 2054    cloZAPine (CLOZARIL) tablet 50 mg, 50 mg, Oral, BID, Deedee Muir MD, 50 mg at 11/21/19 2055    EPINEPHrine PF (ADRENALIN) 1 mg/mL injection 0 15 mg, 0 15 mg, Intramuscular, Once PRN, Deedee Muir MD    fluticasone-vilanterol (BREO ELLIPTA) 200-25 MCG/INH inhaler 1 puff, 1 puff, Inhalation, Daily, Deedee Muir MD, 1 puff at 11/22/19 0915    ketotifen (ZADITOR) 0 025 % ophthalmic solution 1 drop, 1 drop, Right Eye, BID PRN, Deedee Muir MD    levothyroxine tablet 125 mcg, 125 mcg, Oral, Early Morning, Deedee Muir MD, 125 mcg at 11/22/19 0602    magnesium hydroxide (MILK OF MAGNESIA) 400 mg/5 mL oral suspension 30 mL, 30 mL, Oral, Daily PRN, Deedee Muir MD    montelukast (SINGULAIR) tablet 10 mg, 10 mg, Oral, HS, Deedee Muir MD, 10 mg at 11/12/19 2141    OLANZapine (ZyPREXA) IM injection 5 mg, 5 mg, Intramuscular, Q8H PRN, Deedee Muir MD    OLANZapine (ZyPREXA) tablet 5 mg, 5 mg, Oral, Q8H PRN, Deedee Muir MD    ondansetron (ZOFRAN-ODT) dispersible tablet 4 mg, 4 mg, Oral, Q6H PRN, Deedee Muir MD, 4 mg at 11/09/19 1223    pantoprazole (PROTONIX) EC tablet 40 mg, 40 mg, Oral, Early Morning, Deedee Muir MD, 40 mg at 11/22/19 0602    polyethylene glycol (MIRALAX) packet 17 g, 17 g, Oral, Daily PRN, Deedee Muir MD    polyvinyl alcohol (LIQUIFILM TEARS) 1 4 % ophthalmic solution 1 drop, 1 drop, Both Eyes, Q3H PRN, Deedee Muir MD    sertraline (ZOLOFT) tablet 50 mg, 50 mg, Oral, Daily, Deedee Muir MD, 50 mg at 11/22/19 0915    sucralfate (CARAFATE) oral suspension 1,000 mg, 1,000 mg, Oral, BID, Cat Torres MD, 1,000 mg at 11/21/19 1634    theophylline (JEF-24) 24 hr capsule 200 mg, 200 mg, Oral, Daily, Zander Yanez MD, 200 mg at 11/22/19 0915    tiotropium (SPIRIVA) capsule for inhaler 18 mcg, 18 mcg, Inhalation, Daily, Zander Yanez MD, 18 mcg at 11/22/19 0915    traZODone (DESYREL) tablet 25 mg, 25 mg, Oral, HS PRN, Zander Yanez MD  Justification if on more than two antipsychotics:  Only on his clozapine  Side effects if any:  None    Risks , benefits, side effects and precautions of medications discussed with patient and reminded patient to let us know any problems with medications     Objective:     Vital Signs:  Vitals:    11/21/19 2000 11/21/19 2214 11/22/19 0700 11/22/19 0705   BP: 100/70  108/65 99/61   BP Location: Right arm  Left arm Left arm   Pulse: 95  81 72   Resp: 14  17 17   Temp: (!) 97 4 °F (36 3 °C)  97 9 °F (36 6 °C)    TempSrc: Temporal  Temporal    SpO2: 93% 90%     Weight:       Height:         Appearance:  age appropriate, casually dressed and overweight older than stated age, uncombed long grey hair, disheveled unkempt with a body odor   Behavior:  evasive and guarded and suspicious  and preoccupied psychosomatic and argumentative but suspicious and paranoid   Speech:  normal pitch and normal volume but circumstantial and tangential with stilted speech off and   Mood:  anxious and dysthymic   Affect:  mood-congruent, elated and entitled and irritated and sad at times   Thought Process:  goal directed and illogical slightly pressured and tangential and talks as if in a court of law   Thought Content:  delusions  grandiose and somatic  about not being able to breathe and paranoid about people out to harm her and kill her asking her to take showers, her not being able to swallow or breathe or having a terrible illness and dying from cancer No current suicidal homicidal thoughts intent or plans reported    No phobias obsessions or compulsions reported  Also believes that she has hypoglycemia and demands to check her blood sugar for no reason off and on  Again accuses some staff of tampering with her medications oxygen concentrator or felicita meter   Perceptual Disturbances: None and does not appear responding to internal stimuli at times   Risk Potential: Tendency to not care for herself if she goes off treatment   Sensorium:  person, place, time/date, situation, day of week, month of year and time   Cognition:  grossly intact   Consciousness:  alert and awake    Attention: Intact concentration and attention span   Intellect: Considered to be at least of average intelligence   Insight:  limited and in denial of her illness   Judgment: poor      Motor Activity: no abnormal movements         Recent Labs:  Results Reviewed     None          I/O Past 24 hours:  No intake/output data recorded  No intake/output data recorded  Assessment / Plan:     [facility]ffective [facility] ([facility]      Reason for continued inpatient care:  Because of underlying paranoia and refusal to go back to the personal care home and inability to care for herself on her own  Acceptance by patient:  Accepting  Clarisa Mcgregor in recovery:  About living in another personal care home once she feels better  Understanding of medications :  Has some understanding  Involved in reintegration process:  Adjusting to the unit  Trusting in relatoinship with psychiatrist:  Trusting    Recommended Treatment:    Medication changes:  1) no changes today and refuses to increase zoloft when suggested  Non-pharmacological treatments  1) continue with  individual therapy group therapy, milieu therapy and occupational therapy and milieu therapy involving multidisciplinary team approach with psychotherapist, case management, nursing, peer support services, art therapist etc using recovery principles and psycho-education about accepting illness and need for treatment     3) encourage to attend to ADLs like taking showers and wearing clean clothes   4) Encourage to attend groups   Safety  1) Safety/communication plan established targeting dynamic risk factors above  Discharge Plan most likely back to the a:  Personal care boarding home with act services once her delusions are managed and mood becomes more stabilized and she becomes more receptive to treatment compliance    Counseling / Coordination of Care    Total floor / unit time spent today 15 minutes  Greater than 50% of total time was spent with the patient and / or family counseling and / or coordination of care  A description of the counseling / coordination of care  Patient's Rights, confidentiality and exceptions to confidentiality, use of automated medical record, [facility] staff access to medical record, and consent to treatment reviewed      Greer Beltran MD Yes

## 2019-11-22 NOTE — PLAN OF CARE
Problem: Alteration in Thoughts and Perception  Goal: Verbalize thoughts and feelings  Description  Interventions:  - Promote a nonjudgmental and trusting relationship with the patient through active listening and therapeutic communication  - Assess patient's level of functioning, behavior and potential for risk  - Engage patient in 1 on 1 interactions for a minimum of 15 minutes each session  - Encourage patient to express fears, feelings, frustrations, and discuss symptoms    - [place] patient to reality, help patient recognize reality-based thinking   - Administer medications as ordered and assess for potential side effects  - Provide the patient education related to the signs and symptoms of the illness and desired effects of prescribed medications  Outcome: Progressing  Goal: Attend and participate in unit activities, including therapeutic, recreational, and educational groups  Description  Interventions:  - Provide therapeutic and educational activities daily, encourage attendance and participation, and document same in the medical record     CERTIFIED PEER SPECIALIST INTERVENTIONS:    Complete peer assessment with patient to assess their needs and identify their goals to complete while in the recovery program as well as once discharged into the community  Patient will complete WRAP Plan, Crisis Plan and 5 Life Domains  Patient will attend 50% of groups offered on the unit  Patient will complete a goal card weekly      Outcome: Progressing     Problem: Alteration in Orientation  Goal: Interact with staff daily  Description  Interventions:  - Assess and re-assess patient's level of orientation  - Engage patient in 1 on 1 interactions, daily, for a minimum of 15 minutes   - Establish rapport/trust with patient   Outcome: Progressing     Problem: SAFETY ADULT  Goal: Patient will remain free of falls  Description  INTERVENTIONS:  - Assess patient frequently for physical needs  -  Identify cognitive and physical deficits and behaviors that affect risk of falls  -  [place] fall precautions as indicated by assessment   - Educate patient/family on patient safety including physical limitations  - Instruct patient to call for assistance with activity based on assessment  - Modify environment to reduce risk of injury  - Consider OT/PT consult to assist with strengthening/mobility  Outcome: Progressing     Problem: RESPIRATORY - ADULT  Goal: Achieves optimal ventilation and oxygenation  Description  INTERVENTIONS:  - Assess for changes in respiratory status  - Assess for changes in mentation and behavior  - Position to facilitate oxygenation and minimize respiratory effort  - Oxygen administration by appropriate delivery method based on oxygen saturation (per order) or ABGs  - Initiate smoking cessation education as indicated  - Encourage broncho-pulmonary hygiene including cough, deep breathe, Incentive Spirometry  - Assess the need for suctioning and aspirate as needed  - Assess and instruct to report SOB or any respiratory difficulty  - Respiratory Therapy support as indicated  Outcome: Progressing     Problem: Alteration in Thoughts and Perception  Goal: Complete daily ADLs, including personal hygiene independently, as able  Description  Interventions:  - Observe, teach, and assist patient with ADLS  - Monitor and promote a balance of rest/activity, with adequate nutrition and elimination   Outcome: Not Progressing     Danie Steiner has been in her room most of the shift  She will come out briefly  At times she will sit by the phone even though she is not using it  Social with staff and select peers  Good eye contact  Able to make needs known  Came for medication without prompting  Ate 25% of her meal  No shower, but did have a BM this shift  Attended Women's group  Prompted for HS medication  Swallowed pills without difficulty  While eating Yogurt for snack she complained that she could not swallow it    She stated, "I know its psychosomatic  Can I sit by the phone and eat it?"  Reminded that she has to sit in the dining room  Requested staff to sit with her until she was done  No choking noted  She is eating slowly  Oxygen saturation was  93% prior to oxygen 1 liter via nasal cannula placed  Continue to monitor  Precautions maintained

## 2019-11-22 NOTE — PROGRESS NOTES
Progress Note - Brandon Yang 1955, 60 y o  female MRN: [id number]    Unit/Bed#: [facility] 110-02 Encounter: [id number]    Primary Care Provider: Jordan Chavez PA-C   Date and time admitted to hospital: 7/23/2019  5:30 PM        * Schizoaffective disorder, bipolar type [facility])  Assessment & Plan  Psychiatry Progress Note  Patient is difficult to be convinced that she needs to get up and do things and claims that she did finally attend 2 groups yesterday but has not taken a shower since last Saturday for the last 6 days in a row  He is still disheveled uncombed and does not adhere to the behavior plan  She continues to verbalize paranoia about certain staff tampering with her medications and forcing her to take showers and she believe she is going to die if she takes a shower  She does admit that she has psychosomatic symptoms and continues to complain about fear of not able to swallow or breathe and having some terrible illness like cancer that we are not telling her etc   No current suicidal homicidal thoughts intent or plans reported  No overt hallucinations or other delusions reported other than somatic delusions and paranoid  No signs or sxs of agranulocytosis or myocarditis or endocarditis and she is moving her bowels so far with no issues   She was again reminded to work with the program and work towards going back to the [facility] and start attending groups at least 25% of the time we can look into discharging her back to the personal care home but she remains passive-aggressive and does not do anything to help herself or increase group attendance or attend to her ADLs on her even with reminders  Still refusing to make any further changes in her medications like increase in Zoloft but since QTC interval was 482 am not inclined to increasing the clozapine further she does not agree to any other medication changes offered at this time     Current medications:    Current Facility-Administered Medications:     acetaminophen (TYLENOL) tablet 650 mg, 650 mg, Oral, Q6H PRN, Prakash Brewster MD    albuterol (PROVENTIL HFA,VENTOLIN HFA) inhaler 2 puff, 2 puff, Inhalation, Q4H PRN, Prakash Brewster MD, 2 puff at 10/11/19 0424    aluminum-magnesium hydroxide-simethicone (MYLANTA) 200-200-20 mg/5 mL oral suspension 15 mL, 15 mL, Oral, Q4H PRN, Prakash Brewster MD    ammonium lactate (LAC-HYDRIN) 12 % lotion 1 application, 1 application, Topical, BID PRN, Prakash Brewster MD    benzonatate (TESSALON PERLES) capsule 100 mg, 100 mg, Oral, TID PRN, Prakash Brewster MD    benztropine (COGENTIN) injection 1 mg, 1 mg, Intramuscular, Q8H PRN, Prakash Brewster MD    cloZAPine (CLOZARIL) tablet 25 mg, 25 mg, Oral, BID, Prakash Brewster MD, 25 mg at 11/21/19 2054    cloZAPine (CLOZARIL) tablet 50 mg, 50 mg, Oral, BID, Prakash Brewster MD, 50 mg at 11/21/19 2055    EPINEPHrine PF (ADRENALIN) 1 mg/mL injection 0 15 mg, 0 15 mg, Intramuscular, Once PRN, Prakash Brewster MD    fluticasone-vilanterol (BREO ELLIPTA) 200-25 MCG/INH inhaler 1 puff, 1 puff, Inhalation, Daily, Prakash Brewster MD, 1 puff at 11/22/19 0915    ketotifen (ZADITOR) 0 025 % ophthalmic solution 1 drop, 1 drop, Right Eye, BID PRN, Prakash Brewster MD    levothyroxine tablet 125 mcg, 125 mcg, Oral, Early Morning, Prakash Brewster MD, 125 mcg at 11/22/19 0602    magnesium hydroxide (MILK OF MAGNESIA) 400 mg/5 mL oral suspension 30 mL, 30 mL, Oral, Daily PRN, Prakash Brewster MD    montelukast (SINGULAIR) tablet 10 mg, 10 mg, Oral, HS, Prakash Brewster MD, 10 mg at 11/12/19 2141    OLANZapine (ZyPREXA) IM injection 5 mg, 5 mg, Intramuscular, Q8H PRN, Prakash Brewster MD    OLANZapine (ZyPREXA) tablet 5 mg, 5 mg, Oral, Q8H PRN, Prakash Brewster MD    ondansetron (ZOFRAN-ODT) dispersible tablet 4 mg, 4 mg, Oral, Q6H PRN, Prakash Brewster MD, 4 mg at 11/09/19 1223    pantoprazole (PROTONIX) EC tablet 40 mg, 40 mg, Oral, Early Morning, Prakash Brewster MD, 40 mg at 11/22/19 0602    polyethylene glycol (MIRALAX) packet 17 g, 17 g, Oral, Daily PRN, Lonnie Dykes MD    polyvinyl alcohol (LIQUIFILM TEARS) 1 4 % ophthalmic solution 1 drop, 1 drop, Both Eyes, Q3H PRN, Lonnie Dykes MD    sertraline (ZOLOFT) tablet 50 mg, 50 mg, Oral, Daily, Lonnie Dykes MD, 50 mg at 11/22/19 0915    sucralfate (CARAFATE) oral suspension 1,000 mg, 1,000 mg, Oral, BID, Florence Rendon MD, 1,000 mg at 11/21/19 1634    theophylline (JEF-24) 24 hr capsule 200 mg, 200 mg, Oral, Daily, Lonnie Dykes MD, 200 mg at 11/22/19 0915    tiotropium (SPIRIVA) capsule for inhaler 18 mcg, 18 mcg, Inhalation, Daily, Lonnie Dykes MD, 18 mcg at 11/22/19 0915    traZODone (DESYREL) tablet 25 mg, 25 mg, Oral, HS PRN, Lonnie Dykes MD  Justification if on more than two antipsychotics:  Only on his clozapine  Side effects if any:  None    Risks , benefits, side effects and precautions of medications discussed with patient and reminded patient to let us know any problems with medications     Objective:     Vital Signs:  Vitals:    11/21/19 2000 11/21/19 2214 11/22/19 0700 11/22/19 0705   BP: 100/70  108/65 99/61   BP Location: Right arm  Left arm Left arm   Pulse: 95  81 72   Resp: 14  17 17   Temp: (!) 97 4 °F (36 3 °C)  97 9 °F (36 6 °C)    TempSrc: Temporal  Temporal    SpO2: 93% 90%     Weight:       Height:         Appearance:  age appropriate, casually dressed and overweight older than stated age, uncombed long grey hair, disheveled unkempt with a body odor   Behavior:  evasive and guarded and suspicious  and preoccupied psychosomatic and argumentative but suspicious and paranoid   Speech:  normal pitch and normal volume but circumstantial and tangential with stilted speech off and   Mood:  anxious and dysthymic   Affect:  mood-congruent, elated and entitled and irritated and sad at times   Thought Process:  goal directed and illogical slightly pressured and tangential and talks as if in a court of law   Thought Content:  delusions  grandiose and somatic  about not being able to breathe and paranoid about people out to harm her and kill her asking her to take showers, her not being able to swallow or breathe or having a terrible illness and dying from cancer No current suicidal homicidal thoughts intent or plans reported  No phobias obsessions or compulsions reported  Also believes that she has hypoglycemia and demands to check her blood sugar for no reason off and on  Again accuses some staff of tampering with her medications oxygen concentrator or felicita meter   Perceptual Disturbances: None and does not appear responding to internal stimuli at times   Risk Potential: Tendency to not care for herself if she goes off treatment   Sensorium:  person, place, time/date, situation, day of week, month of year and time   Cognition:  grossly intact   Consciousness:  alert and awake    Attention: Intact concentration and attention span   Intellect: Considered to be at least of average intelligence   Insight:  limited and in denial of her illness   Judgment: poor      Motor Activity: no abnormal movements         Recent Labs:  Results Reviewed     None          I/O Past 24 hours:  No intake/output data recorded  No intake/output data recorded          Assessment / Plan:     [facility]oaffective [facility]      Reason for continued inpatient care:  Because of underlying paranoia and refusal to go back to the personal care home and inability to care for herself on her own  Acceptance by patient:  Accepting  Juan Lisa in recovery:  About living in another personal care home once she feels better  Understanding of medications :  Has some understanding  Involved in reintegration process:  Adjusting to the unit  Trusting in relatoinship with psychiatrist:  Trusting    Recommended Treatment:    Medication changes:  1) no changes today and refuses to increase zoloft when suggested  Non-pharmacological treatments  1) continue with  individual therapy group therapy, milieu therapy and occupational therapy and milieu therapy involving multidisciplinary team approach with psychotherapist, case management, nursing, peer support services, art therapist etc using recovery principles and psycho-education about accepting illness and need for treatment   3) encourage to attend to ADLs like taking showers and wearing clean clothes   4) Encourage to attend groups   Safety  1) Safety/communication plan established targeting dynamic risk factors above  Discharge Plan most likely back to the a:  Personal care boarding home with act services once her delusions are managed and mood becomes more stabilized and she becomes more receptive to treatment compliance    Counseling / Coordination of Care    Total floor / unit time spent today 15 minutes  Greater than 50% of total time was spent with the patient and / or family counseling and / or coordination of care  A description of the counseling / coordination of care  Patient's Rights, confidentiality and exceptions to confidentiality, use of automated medical record, [facility] staff access to medical record, and consent to treatment reviewed      Deedee Muir MD

## 2019-11-22 NOTE — PLAN OF CARE
Problem: PAIN - ADULT  Goal: Verbalizes/displays adequate comfort level or baseline comfort level  Description  Interventions:  - Encourage patient to monitor pain and request assistance  - Assess pain using appropriate pain scale  - Administer analgesics based on type and severity of pain and evaluate response  - Implement non-pharmacological measures as appropriate and evaluate response  - Consider cultural and social influences on pain and pain management  - Notify physician/advanced practitioner if interventions unsuccessful or patient reports new pain  Outcome: Progressing     Problem: SAFETY ADULT  Goal: Patient will remain free of falls  Description  INTERVENTIONS:  - Assess patient frequently for physical needs  -  Identify cognitive and physical deficits and behaviors that affect risk of falls  -  [place] fall precautions as indicated by assessment   - Educate patient/family on patient safety including physical limitations  - Instruct patient to call for assistance with activity based on assessment  - Modify environment to reduce risk of injury  - Consider OT/PT consult to assist with strengthening/mobility  Outcome: Memo Bonds maintained on ongoing SAFE precaution without incident on this shift   In bed, eyes closed, breath even and unlabored   On O2 concentrator at 1L/m with humidifier via NC   Q 15 minutes rounding continues   No behavioral noted    Will continue to monitor behavior and sleep pattern

## 2019-11-22 NOTE — PLAN OF CARE
Problem: Alteration in Thoughts and Perception  Goal: Verbalize thoughts and feelings  Description  Interventions:  - Promote a nonjudgmental and trusting relationship with the patient through active listening and therapeutic communication  - Assess patient's level of functioning, behavior and potential for risk  - Engage patient in 1 on 1 interactions for a minimum of 15 minutes each session  - Encourage patient to express fears, feelings, frustrations, and discuss symptoms    - [place] patient to reality, help patient recognize reality-based thinking   - Administer medications as ordered and assess for potential side effects  - Provide the patient education related to the signs and symptoms of the illness and desired effects of prescribed medications  Outcome: Progressing  Goal: Agree to be compliant with medication regime, as prescribed and report medication side effects  Description  Interventions:  - Offer appropriate PRN medication and supervise ingestion; conduct aims, as needed   Outcome: Progressing     Problem: Risk for Self Injury/Neglect  Goal: Verbalize thoughts and feelings  Description  Interventions:  - Assess and re-assess patient's lethality and potential for self-injury  - Engage patient in 1:1 interactions, daily, for a minimum of 15 minutes  - Encourage patient to express feelings, fears, frustrations, hopes  - Establish rapport/trust with patient   Outcome: Progressing  Goal: Refrain from harming self  Description  Interventions:  - Monitor patient closely, per order  - Develop a trusting relationship  - Supervise medication ingestion, monitor effects and side effects   Outcome: Progressing     Problem: Depression  Goal: Verbalize thoughts and feelings  Description  Interventions:  - Assess and re-assess patient's level of risk   - Engage patient in 1:1 interactions, daily, for a minimum of 15 minutes   - Encourage patient to express feelings, fears, frustrations, hopes   Outcome: Progressing     Problem: Alteration in Orientation  Goal: Interact with staff daily  Description  Interventions:  - Assess and re-assess patient's level of orientation  - Engage patient in 1 on 1 interactions, daily, for a minimum of 15 minutes   - Establish rapport/trust with patient   Outcome: Progressing     Problem: PAIN - ADULT  Goal: Verbalizes/displays adequate comfort level or baseline comfort level  Description  Interventions:  - Encourage patient to monitor pain and request assistance  - Assess pain using appropriate pain scale  - Administer analgesics based on type and severity of pain and evaluate response  - Implement non-pharmacological measures as appropriate and evaluate response  - Consider cultural and social influences on pain and pain management  - Notify physician/advanced practitioner if interventions unsuccessful or patient reports new pain  Outcome: Progressing     Problem: SAFETY ADULT  Goal: Patient will remain free of falls  Description  INTERVENTIONS:  - Assess patient frequently for physical needs  -  Identify cognitive and physical deficits and behaviors that affect risk of falls    -  [place] fall precautions as indicated by assessment   - Educate patient/family on patient safety including physical limitations  - Instruct patient to call for assistance with activity based on assessment  - Modify environment to reduce risk of injury  - Consider OT/PT consult to assist with strengthening/mobility  Outcome: Progressing     Problem: Alteration in Thoughts and Perception  Goal: Treatment Goal: Gain control of psychotic behaviors/thinking, reduce/eliminate presenting symptoms and demonstrate improved reality functioning upon discharge  Outcome: Not Progressing  Goal: Recognize dysfunctional thoughts, communicate reality-based thoughts at the time of discharge  Description  Interventions:  - Provide medication and psycho-education to assist patient in compliance and developing insight into his/her illness   Outcome: Not Progressing  Goal: Complete daily ADLs, including personal hygiene independently, as able  Description  Interventions:  - Observe, teach, and assist patient with ADLS  - Monitor and promote a balance of rest/activity, with adequate nutrition and elimination   Outcome: Not Progressing     Problem: Depression  Goal: Refrain from isolation  Description  Interventions:  - Develop a trusting relationship   - Encourage socialization   Outcome: Not Progressing  Goal: Refrain from self-neglect  Outcome: Not Progressing     Problem: Nutrition/Hydration-ADULT  Goal: Nutrient/Hydration intake appropriate for improving, restoring or maintaining nutritional needs  Description  Monitor and assess patient's nutrition/hydration status for malnutrition  Collaborate with interdisciplinary team and initiate plan and interventions as ordered  Monitor patient's weight and dietary intake as ordered or per policy  Utilize nutrition screening tool and intervene as necessary  Determine patient's food preferences and provide high-protein, high-caloric foods as appropriate       INTERVENTIONS:  - Monitor oral intake, urinary output, labs, and treatment plans  - Assess nutrition and hydration status and recommend course of action  - Evaluate amount of meals eaten  - Assist patient with eating if necessary   - Allow adequate time for meals  - Recommend/ encourage appropriate diets, oral nutritional supplements, and vitamin/mineral supplements  - Order, calculate, and assess calorie counts as needed  - Recommend, monitor, and adjust tube feedings and TPN/PPN based on assessed needs  - Assess need for intravenous fluids  - Provide specific nutrition/hydration education as appropriate  - Include patient/family/caregiver in decisions related to nutrition  Outcome: Not Progressing   The patient has been mostly isolative to her room, laying in her bed but did come out for meds and meals and attended the nursing group in the afternoon  No somatic complaints voiced  Ate poorly: 15% breakfast and 25% lunch  Refused to use the incentive spirometer and continues to refuse to shower or do hygiene  Staff attempted to educate the patient multiple times throughout the day but she continues to not listen to staff or doctor recommendations about maintaining proper health  No other behaviors or issues noted  Continue to monitor

## 2019-11-22 NOTE — PROGRESS NOTES
11/22/19 0800   Team Meeting   Meeting Type Daily Rounds   Team Members Present   Team Members Present Physician;Nurse;   Physician Team Member Dr Rosa Jimenez Team Member Jewels Snyder RN   Care Management Team Member Brenda So     Patient attended [facility] and [facility]'s group yesterday  Ate 50/0/25 of meals yesterday  Last time she washed her hair was 11/09/19 and last shower was 11/16/19  Slept

## 2019-11-23 NOTE — ASSESSMENT & PLAN NOTE
Psychiatry Progress Note  Patient is  still disheveled uncombed hair and does not adhere to the behavior plan and has not taken showers in the last 7 days has not used to shampoo for more than 2 weeks  He knows the staff requests excusing herself for multiple physical complaints like not being able to swallow or breathe or having low blood sugar and appears anxious believing that she is going to be killed if she take showers  She continues to verbalize paranoia about certain staff tampering with her medications   She does admit that she has psychosomatic symptoms with belief that some terrible illness like cancer that we are not telling her etc   No current suicidal homicidal thoughts intent or plans reported  No overt hallucinations or other delusions reported other than somatic delusions and paranoid  No signs or sxs of agranulocytosis or myocarditis or endocarditis and she is moving her bowels so far with no issues   She was again reminded to work with the program and work towards going back to the [facility] and start attending groups at least 25% of the time we can look into discharging her back to the [facility] care home but she remains passive-aggressive and does not do anything to help herself or increase group attendance or attend to her ADLs on her even with reminders  Finally agreed to try Zoloft as 50 mg twice a day is that would alleviate her anxiety and depression even though she claims she is not depressed      Current medications:    Current Facility-Administered Medications:     acetaminophen (TYLENOL) tablet 650 mg, 650 mg, Oral, Q6H PRN, Deedee Muir MD    albuterol (PROVENTIL HFA,VENTOLIN HFA) inhaler 2 puff, 2 puff, Inhalation, Q4H PRN, Deedee Muir MD, 2 puff at 10/11/19 0424    aluminum-magnesium hydroxide-simethicone (MYLANTA) 200-200-20 mg/5 mL oral suspension 15 mL, 15 mL, Oral, Q4H PRN, Deedee Muir MD    ammonium lactate (LAC-HYDRIN) 12 % lotion 1 application, 1 application, Topical, BID PRN, Roberto Colorado MD    benzonatate (TESSALON PERLES) capsule 100 mg, 100 mg, Oral, TID PRN, Roberto Colorado MD    benztropine (COGENTIN) injection 1 mg, 1 mg, Intramuscular, Q8H PRN, Roberto Colorado MD    cloZAPine (CLOZARIL) tablet 25 mg, 25 mg, Oral, BID, Roberto Colorado MD, 25 mg at 11/22/19 2130    cloZAPine (CLOZARIL) tablet 50 mg, 50 mg, Oral, BID, Roberto Colorado MD, 50 mg at 11/22/19 2130    EPINEPHrine PF (ADRENALIN) 1 mg/mL injection 0 15 mg, 0 15 mg, Intramuscular, Once PRN, Roberto Colorado MD    fluticasone-vilanterol (BREO ELLIPTA) 200-25 MCG/INH inhaler 1 puff, 1 puff, Inhalation, Daily, Roberto Coloraod MD, 1 puff at 11/23/19 0858    ketotifen (ZADITOR) 0 025 % ophthalmic solution 1 drop, 1 drop, Right Eye, BID PRN, Roberto Colorado MD    levothyroxine tablet 125 mcg, 125 mcg, Oral, Early Morning, Roberto Colorado MD, 125 mcg at 11/23/19 0609    magnesium hydroxide (MILK OF MAGNESIA) 400 mg/5 mL oral suspension 30 mL, 30 mL, Oral, Daily PRN, Roberto Colorado MD    montelukast (SINGULAIR) tablet 10 mg, 10 mg, Oral, HS, Roberto Colorado MD, 10 mg at 11/12/19 2141    OLANZapine (ZyPREXA) IM injection 5 mg, 5 mg, Intramuscular, Q8H PRN, Roberto Colorado MD    OLANZapine (ZyPREXA) tablet 5 mg, 5 mg, Oral, Q8H PRN, Roberto Colorado MD    ondansetron (ZOFRAN-ODT) dispersible tablet 4 mg, 4 mg, Oral, Q6H PRN, Roberto Colorado MD, 4 mg at 11/09/19 1223    pantoprazole (PROTONIX) EC tablet 40 mg, 40 mg, Oral, Early Morning, Roberto Coolrado MD, 40 mg at 11/23/19 0609    polyethylene glycol (MIRALAX) packet 17 g, 17 g, Oral, Daily PRN, Roberto Colorado MD    polyvinyl alcohol (LIQUIFILM TEARS) 1 4 % ophthalmic solution 1 drop, 1 drop, Both Eyes, Q3H PRN, Roberto Colorado MD    sertraline (ZOLOFT) tablet 50 mg, 50 mg, Oral, BID, Roberto Colorado MD    sucralfate (CARAFATE) oral suspension 1,000 mg, 1,000 mg, Oral, BID, Cat Torres MD, 1,000 mg at 11/22/19 2358    theophylline (JEF-24) 24 hr capsule 200 mg, 200 mg, Oral, Daily, Shilpa Trujillo MD, 200 mg at 11/23/19 0858    tiotropium [facility]) capsule for inhaler 18 mcg, 18 mcg, Inhalation, Daily, Shilpa Trujillo MD, 18 mcg at 11/23/19 0900    traZODone (DESYREL) tablet 25 mg, 25 mg, Oral, HS PRN, Shilpa Trujillo MD  Justification if on more than two antipsychotics:  Only on his clozapine  Side effects if any:  None    Risks , benefits, side effects and precautions of medications discussed with patient and reminded patient to let us know any problems with medications     Objective:     Vital Signs:  Vitals:    11/22/19 0700 11/22/19 0705 11/22/19 1650 11/22/19 1937   BP: 108/65 99/61 96/69 109/70   BP Location: Left arm Left arm Right arm Right arm   Pulse: 81 72 78 81   Resp: 17 17 16 16   Temp: 97 9 °F (36 6 °C)  97 6 °F (36 4 °C) 97 8 °F (36 6 °C)   TempSrc: Temporal  Temporal Temporal   SpO2:   95% 97%   Weight:       Height:         Appearance:  age appropriate, casually dressed and overweight older than stated age, uncombed long grey hair, disheveled unkempt with a body odor with a bad body odor when approached   Behavior:  evasive and guarded and suspicious  and preoccupied psychosomatic and argumentative refusing to take showers   Speech:  normal pitch and normal volume but circumstantial and tangential with stilted speech off and on   Mood:  anxious and dysthymic   Affect:  mood-congruent, elated and entitled and irritated and sad at times and anxious   Thought Process:  goal directed and illogical slightly pressured and tangential and talks as if in a court of law   Thought Content:  delusions  grandiose and somatic believing she cannot breathe or cannot swallow has low blood sugar or she is going to die if he takes a shower etc   Also believes that she has some terrible illness like cancer and we are not sharing with her  No current suicidal homicidal thoughts intent or plans reported  No phobias obsessions or compulsions reported  Also accuses staff of tampering with her  Spirometer and oxygen concentrator as well as with her medication   Perceptual Disturbances: None and does not appear responding to internal stimuli at times   Risk Potential: Tendency to not care for herself if she goes off treatment   Sensorium:  person, place, time/date, situation, day of week, month of year and time   Cognition:  grossly intact   Consciousness:  alert and awake    Attention: Intact concentration and attention span   Intellect: Considered to be at least of average intelligence   Insight:  limited and in denial of her illness   Judgment: poor      Motor Activity: no abnormal movements         Recent Labs:  Results Reviewed     None          I/O Past 24 hours:  No intake/output data recorded  No intake/output data recorded  Assessment / Plan:     Schizoaffective disorder, bipolar type ([facility] )      Reason for continued inpatient care:  Because of underlying paranoia and refusal to go back to the personal care home and inability to care for herself on her own  Acceptance by patient:  Accepting  [place] in recovery:  About living in another personal care home once she feels better  Understanding of medications :  Has some understanding  Involved in reintegration process:  Adjusting to the unit  Trusting in relatoinship with psychiatrist:  Trusting    Recommended Treatment:    Medication changes:  1) increase Zoloft as 50 mg twice a day after reviewing with her risks side effects benefits and precautions  Non-pharmacological treatments  1) continue with  individual therapy group therapy, milieu therapy and occupational therapy and milieu therapy involving multidisciplinary team approach with psychotherapist, case management, nursing, peer support services, art therapist etc using recovery principles and psycho-education about accepting illness and need for treatment     3) encourage to attend to ADLs like taking showers and wearing clean clothes   4) Encourage to attend groups   Safety  1) Safety/communication plan established targeting dynamic risk factors above  Discharge Plan most likely back to the a:  Personal care boarding home with act services once her delusions are managed and mood becomes more stabilized and she becomes more receptive to treatment compliance    Counseling / Coordination of Care    Total floor / unit time spent today 15 minutes  Greater than 50% of total time was spent with the patient and / or family counseling and / or coordination of care  A description of the counseling / coordination of care  Patient's Rights, confidentiality and exceptions to confidentiality, use of automated medical record, [facility] to medical record, and consent to treatment reviewed      Jerome Lopez MD

## 2019-11-23 NOTE — PLAN OF CARE
Problem: PAIN - ADULT  Goal: Verbalizes/displays adequate comfort level or baseline comfort level  Description  Interventions:  - Encourage patient to monitor pain and request assistance  - Assess pain using appropriate pain scale  - Administer analgesics based on type and severity of pain and evaluate response  - Implement non-pharmacological measures as appropriate and evaluate response  - Consider cultural and social influences on pain and pain management  - Notify physician/advanced practitioner if interventions unsuccessful or patient reports new pain  Outcome: Progressing     Problem: SAFETY ADULT  Goal: Patient will remain free of falls  Description  INTERVENTIONS:  - Assess patient frequently for physical needs  -  Identify cognitive and physical deficits and behaviors that affect risk of falls    -  [place] fall precautions as indicated by assessment   - Educate patient/family on patient safety including physical limitations  - Instruct patient to call for assistance with activity based on assessment  - Modify environment to reduce risk of injury  - Consider OT/PT consult to assist with strengthening/mobility  Outcome: Progressing     Problem: RESPIRATORY - ADULT  Goal: Achieves optimal ventilation and oxygenation  Description  INTERVENTIONS:  - Assess for changes in respiratory status  - Assess for changes in mentation and behavior  - Position to facilitate oxygenation and minimize respiratory effort  - Oxygen administration by appropriate delivery method based on oxygen saturation (per order) or ABGs  - Initiate smoking cessation education as indicated  - Encourage broncho-pulmonary hygiene including cough, deep breathe, Incentive Spirometry  - Assess the need for suctioning and aspirate as needed  - Assess and instruct to report SOB or any respiratory difficulty  - Respiratory Therapy support as indicated  Outcome: Progressing     Problem: SLEEP DISTURBANCE  Goal: Will exhibit normal sleeping pattern  Description  Interventions:  -  Assess the patients sleep pattern, noting recent changes  - Administer medication as ordered  - Decrease environmental stimuli, including noise, as appropriate during the night  - Encourage the patient to actively participate in unit groups and or exercise during the day to enhance ability to achieve adequate sleep at night  - Assess the patient, in the morning, encouraging a description of sleep experience  Outcome: Irma Daylin maintained on ongoing SAFE precaution without incident on this shift   In bed, eyes closed, breath even and unlabored   On O2 concentrator at 1L/m with humidifier via NC   Q 15 minutes rounding continues   No behavioral noted    Will continue to monitor behavior and sleep pattern

## 2019-11-23 NOTE — PLAN OF CARE
Problem: Alteration in Thoughts and Perception  Goal: Verbalize thoughts and feelings  Description  Interventions:  - Promote a nonjudgmental and trusting relationship with the patient through active listening and therapeutic communication  - Assess patient's level of functioning, behavior and potential for risk  - Engage patient in 1 on 1 interactions for a minimum of 15 minutes each session  - Encourage patient to express fears, feelings, frustrations, and discuss symptoms    - [place] patient to reality, help patient recognize reality-based thinking   - Administer medications as ordered and assess for potential side effects  - Provide the patient education related to the signs and symptoms of the illness and desired effects of prescribed medications  Outcome: Progressing  Goal: Agree to be compliant with medication regime, as prescribed and report medication side effects  Description  Interventions:  - Offer appropriate PRN medication and supervise ingestion; conduct aims, as needed   Outcome: Not Progressing  Goal: Complete daily ADLs, including personal hygiene independently, as able  Description  Interventions:  - Observe, teach, and assist patient with ADLS  - Monitor and promote a balance of rest/activity, with adequate nutrition and elimination   Outcome: Progressing     Maggie needed to be prompted to get out of bed for vitals, meals as well as her morning medications  She refused carafate before this shift and said that she was too tired to take it  She also stated that she was not feeling well when this writer offered to use her incentive spirometer  Remains somatically preoccupied and impatient when she asks about her lab values and when she requests testing to be done on her such as a blood sugar being taken  Attempts at redirection are effective at times but she remains focused on somatic concerns  Will continue to monitor

## 2019-11-23 NOTE — PROGRESS NOTES
Progress Note - Nolberto Galvin 1955, 60 y o  female MRN: [id number]    [facility] Encounter: [id number]    Primary Care Provider: Aspen Metzger PA-C   Date and time admitted to hospital: 7/23/2019  5:30 PM        * Schizoaffective disorder, bipolar type [facility])  Assessment & Plan  Psychiatry Progress Note  Patient is  still disheveled uncombed hair and does not adhere to the behavior plan and has not taken showers in the last 7 days has not used to shampoo for more than 2 weeks  He knows the staff requests excusing herself for multiple physical complaints like not being able to swallow or breathe or having low blood sugar and appears anxious believing that she is going to be killed if she take showers  She continues to verbalize paranoia about certain staff tampering with her medications   She does admit that she has psychosomatic symptoms with belief that some terrible illness like cancer that we are not telling her etc   No current suicidal homicidal thoughts intent or plans reported  No overt hallucinations or other delusions reported other than somatic delusions and paranoid  No signs or sxs of agranulocytosis or myocarditis or endocarditis and she is moving her bowels so far with no issues   She was again reminded to work with the program and work towards going back to the [facility] and start attending groups at least 25% of the time we can look into discharging her back to the personal care home but she remains passive-aggressive and does not do anything to help herself or increase group attendance or attend to her ADLs on her even with reminders  Finally agreed to try Zoloft as 50 mg twice a day is that would alleviate her anxiety and depression even though she claims she is not depressed      Current medications:    Current Facility-Administered Medications:     acetaminophen (TYLENOL) tablet 650 mg, 650 mg, Oral, Q6H PRN, Dalton Garner MD    albuterol (PROVENTIL HFA,VENTOLIN HFA) inhaler 2 puff, 2 puff, Inhalation, Q4H PRN, Jill Gayle MD, 2 puff at 10/11/19 0424    aluminum-magnesium hydroxide-simethicone (MYLANTA) 200-200-20 mg/5 mL oral suspension 15 mL, 15 mL, Oral, Q4H PRN, Jill Gayle MD    ammonium lactate (LAC-HYDRIN) 12 % lotion 1 application, 1 application, Topical, BID PRN, Jill Gayle MD    benzonatate (TESSALON PERLES) capsule 100 mg, 100 mg, Oral, TID PRN, Jill Gayle MD    benztropine (COGENTIN) injection 1 mg, 1 mg, Intramuscular, Q8H PRN, Jill Gayle MD    cloZAPine (CLOZARIL) tablet 25 mg, 25 mg, Oral, BID, Jill Gayle MD, 25 mg at 11/22/19 2130    cloZAPine (CLOZARIL) tablet 50 mg, 50 mg, Oral, BID, Jill Gayle MD, 50 mg at 11/22/19 2130    EPINEPHrine PF (ADRENALIN) 1 mg/mL injection 0 15 mg, 0 15 mg, Intramuscular, Once PRN, Jill Gayle MD    fluticasone-vilanterol (BREO ELLIPTA) 200-25 MCG/INH inhaler 1 puff, 1 puff, Inhalation, Daily, Jill Gayle MD, 1 puff at 11/23/19 0858    ketotifen (ZADITOR) 0 025 % ophthalmic solution 1 drop, 1 drop, Right Eye, BID PRN, Jill Gayle MD    levothyroxine tablet 125 mcg, 125 mcg, Oral, Early Morning, Jill Gayle MD, 125 mcg at 11/23/19 0609    magnesium hydroxide (MILK OF MAGNESIA) 400 mg/5 mL oral suspension 30 mL, 30 mL, Oral, Daily PRN, Jill Gayle MD    montelukast (SINGULAIR) tablet 10 mg, 10 mg, Oral, HS, Jill Gayle MD, 10 mg at 11/12/19 2141    OLANZapine (ZyPREXA) IM injection 5 mg, 5 mg, Intramuscular, Q8H PRN, Jill Gayle MD    OLANZapine (ZyPREXA) tablet 5 mg, 5 mg, Oral, Q8H PRN, Jill Gayle MD    ondansetron (ZOFRAN-ODT) dispersible tablet 4 mg, 4 mg, Oral, Q6H PRN, Jill Gayle MD, 4 mg at 11/09/19 1223    pantoprazole (PROTONIX) EC tablet 40 mg, 40 mg, Oral, Early Morning, Jill Gayle MD, 40 mg at 11/23/19 0609    polyethylene glycol (MIRALAX) packet 17 g, 17 g, Oral, Daily PRN, Jill Gayle MD    polyvinyl alcohol (LIQUIFILM TEARS) 1 4 % ophthalmic solution 1 drop, 1 drop, Both Eyes, Q3H PRN, Carissa Willis MD    sertraline (ZOLOFT) tablet 50 mg, 50 mg, Oral, BID, Carissa Willis MD    sucralfate (CARAFATE) oral suspension 1,000 mg, 1,000 mg, Oral, BID, Dennise Barker MD, 1,000 mg at 11/22/19 1628    theophylline (JEF-24) 24 hr capsule 200 mg, 200 mg, Oral, Daily, Carissa Willis MD, 200 mg at 11/23/19 0858    tiotropium (SPIRIVA) capsule for inhaler 18 mcg, 18 mcg, Inhalation, Daily, Carissa Willis MD, 18 mcg at 11/23/19 0900    traZODone (DESYREL) tablet 25 mg, 25 mg, Oral, HS PRN, Carissa Willis MD  Justification if on more than two antipsychotics:  Only on his clozapine  Side effects if any:  None    Risks , benefits, side effects and precautions of medications discussed with patient and reminded patient to let us know any problems with medications     Objective:     Vital Signs:  Vitals:    11/22/19 0700 11/22/19 0705 11/22/19 1650 11/22/19 1937   BP: 108/65 99/61 96/69 109/70   BP Location: Left arm Left arm Right arm Right arm   Pulse: 81 72 78 81   Resp: 17 17 16 16   Temp: 97 9 °F (36 6 °C)  97 6 °F (36 4 °C) 97 8 °F (36 6 °C)   TempSrc: Temporal  Temporal Temporal   SpO2:   95% 97%   Weight:       Height:         Appearance:  age appropriate, casually dressed and overweight older than stated age, uncombed long grey hair, disheveled unkempt with a body odor with a bad body odor when approached   Behavior:  evasive and guarded and suspicious  and preoccupied psychosomatic and argumentative refusing to take showers   Speech:  normal pitch and normal volume but circumstantial and tangential with stilted speech off and on   Mood:  anxious and dysthymic   Affect:  mood-congruent, elated and entitled and irritated and sad at times and anxious   Thought Process:  goal directed and illogical slightly pressured and tangential and talks as if in a court of law   Thought Content:  delusions  grandiose and somatic believing she cannot breathe or cannot swallow has low blood sugar or she is going to die if he takes a shower etc   Also believes that she has some terrible illness like cancer and we are not sharing with her  No current suicidal homicidal thoughts intent or plans reported  No phobias obsessions or compulsions reported  Also accuses staff of tampering with her  Spirometer and oxygen concentrator as well as with her medication   Perceptual Disturbances: None and does not appear responding to internal stimuli at times   Risk Potential: Tendency to not care for herself if she goes off treatment   Sensorium:  person, place, time/date, situation, day of week, month of year and time   Cognition:  grossly intact   Consciousness:  alert and awake    Attention: Intact concentration and attention span   Intellect: Considered to be at least of average intelligence   Insight:  limited and in denial of her illness   Judgment: poor      Motor Activity: no abnormal movements         Recent Labs:  Results Reviewed     None          I/O Past 24 hours:  No intake/output data recorded  No intake/output data recorded          Assessment / Plan:     [facility]      Reason for continued inpatient care:  Because of underlying paranoia and refusal to go back to the personal care home and inability to care for herself on her own  Acceptance by patient:  Accepting  Gladys Vincent in recovery:  About living in another personal care home once she feels better  Understanding of medications :  Has some understanding  Involved in reintegration process:  Adjusting to the unit  Trusting in relatoinship with psychiatrist:  Trusting    Recommended Treatment:    Medication changes:  1) increase Zoloft as 50 mg twice a day after reviewing with her risks side effects benefits and precautions  Non-pharmacological treatments  1) continue with  individual therapy group therapy, milieu therapy and occupational therapy and milieu therapy involving multidisciplinary team approach with psychotherapist, case management, nursing, peer support services, art therapist etc using recovery principles and psycho-education about accepting illness and need for treatment   3) encourage to attend to ADLs like taking showers and wearing clean clothes   4) Encourage to attend groups   Safety  1) Safety/communication plan established targeting dynamic risk factors above  Discharge Plan most likely back to the a:  Personal care boarding home with act services once her delusions are managed and mood becomes more stabilized and she becomes more receptive to treatment compliance    Counseling / Coordination of Care    Total floor / unit time spent today 15 minutes  Greater than 50% of total time was spent with the patient and / or family counseling and / or coordination of care  A description of the counseling / coordination of care  Patient's Rights, confidentiality and exceptions to confidentiality, use of automated medical record, [facility] to medical record, and consent to treatment reviewed      Mynor Terry MD

## 2019-11-23 NOTE — PLAN OF CARE
Problem: Alteration in Thoughts and Perception  Goal: Verbalize thoughts and feelings  Description  Interventions:  - Promote a nonjudgmental and trusting relationship with the patient through active listening and therapeutic communication  - Assess patient's level of functioning, behavior and potential for risk  - Engage patient in 1 on 1 interactions for a minimum of 15 minutes each session  - Encourage patient to express fears, feelings, frustrations, and discuss symptoms    - [place] patient to reality, help patient recognize reality-based thinking   - Administer medications as ordered and assess for potential side effects  - Provide the patient education related to the signs and symptoms of the illness and desired effects of prescribed medications  Outcome: Progressing  Goal: Agree to be compliant with medication regime, as prescribed and report medication side effects  Description  Interventions:  - Offer appropriate PRN medication and supervise ingestion; conduct aims, as needed   Outcome: Progressing     Problem: Alteration in Thoughts and Perception  Goal: Attend and participate in unit activities, including therapeutic, recreational, and educational groups  Description  Interventions:  - Provide therapeutic and educational activities daily, encourage attendance and participation, and document same in the medical record     CERTIFIED PEER SPECIALIST INTERVENTIONS:    Complete peer assessment with patient to assess their needs and identify their goals to complete while in the recovery program as well as once discharged into the community  Patient will complete WRAP Plan, Crisis Plan and 5 Life Domains  Patient will attend 50% of groups offered on the unit  Patient will complete a goal card weekly      Outcome: Not Progressing  Goal: Recognize dysfunctional thoughts, communicate reality-based thoughts at the time of discharge  Description  Interventions:  - Provide medication and psycho-education to assist patient in compliance and developing insight into his/her illness   Outcome: Not Progressing  Goal: Complete daily ADLs, including personal hygiene independently, as able  Description  Interventions:  - Observe, teach, and assist patient with ADLS  - Monitor and promote a balance of rest/activity, with adequate nutrition and elimination   Outcome: Not Progressing     Problem: Depression  Goal: Refrain from isolation  Description  Interventions:  - Develop a trusting relationship   - Encourage socialization   Outcome: Not Progressing     Problem: Nutrition/Hydration-ADULT  Goal: Nutrient/Hydration intake appropriate for improving, restoring or maintaining nutritional needs  Description  Monitor and assess patient's nutrition/hydration status for malnutrition  Collaborate with interdisciplinary team and initiate plan and interventions as ordered  Monitor patient's weight and dietary intake as ordered or per policy  Utilize nutrition screening tool and intervene as necessary  Determine patient's food preferences and provide high-protein, high-caloric foods as appropriate  INTERVENTIONS:  - Monitor oral intake, urinary output, labs, and treatment plans  - Assess nutrition and hydration status and recommend course of action  - Evaluate amount of meals eaten  - Assist patient with eating if necessary   - Allow adequate time for meals  - Recommend/ encourage appropriate diets, oral nutritional supplements, and vitamin/mineral supplements  - Order, calculate, and assess calorie counts as needed  - Recommend, monitor, and adjust tube feedings and TPN/PPN based on assessed needs  - Assess need for intravenous fluids  - Provide specific nutrition/hydration education as appropriate  - Include patient/family/caregiver in decisions related to nutrition  Outcome: Not Progressing     [address] continues to ignore hygiene (6 days since shower, 14 days since shampoo)  She is isolative to to her room & bed   Out of room only for meal; had only fluids (coffee, juice, milk, sherbet)  For HS snack had 2 yogurts  Did come for her scheduled medicine (w/exception of Singulair which she refuses), but, coughing, spitting out the pills, trying again to take them, repeated several times until got them down  Tonight much focused on perceived inability to swallow  Used Incentive Spirometer for volumes of 1000-1100ml  Wearing now her QHS humidified nasal O2 @ 1L for bed  Did not attend PM Group

## 2019-11-24 NOTE — ASSESSMENT & PLAN NOTE
Psychiatry Progress Note  Patient is again argumentative about having low blood sugar and was demanding to get her blood sugar checked yesterday and the staff refused to do so as she has psychosomatic and there is no documented hypoglycemia diagnosis at all  She has not taken a shower in more than a week now and has not used to shampoo in more than 2 weeks now and remains disheveled unkempt with a body odor when approached  She continues to verbalize paranoia about certain staff tampering with her medications which has not changed   She continues to believe the she has some terrible illness like cancer that we are not telling her   Still psychosomatic in claiming that she cannot swallow or breathe properly and feels weekend has hypoglycemia and has terrible illness like cancer etc   Still refusing to attend any groups and prefers to lay back in bed but gets up for meals and for medications  No current suicidal homicidal thoughts intent or plans reported  No overt hallucinations or other delusions reported other than somatic delusions and paranoia  No signs or sxs of agranulocytosis or myocarditis or endocarditis and she is moving her bowels so far with no issues   She was again reminded to work with the program and work towards going back to the [facility] and start attending groups at least 25% of the time we can look into discharging her back to the personal care home but she remains passive-aggressive and refuses to do anything to help herself or increase group attendance or attend to her ADLs even with reminders and has not been complying with the behavior plan either  Has been compliant with medications including the recent increase in Zoloft      Current medications:    Current Facility-Administered Medications:     acetaminophen (TYLENOL) tablet 650 mg, 650 mg, Oral, Q6H PRN, Isidoro Gonzalez MD    albuterol (PROVENTIL HFA,VENTOLIN HFA) inhaler 2 puff, 2 puff, Inhalation, Q4H PRN, Radu Bennett Sean Osborne MD, 2 puff at 10/11/19 0424    aluminum-magnesium hydroxide-simethicone (MYLANTA) 200-200-20 mg/5 mL oral suspension 15 mL, 15 mL, Oral, Q4H PRN, Faheem Daniels MD    ammonium lactate (LAC-HYDRIN) 12 % lotion 1 application, 1 application, Topical, BID PRN, Faheem Daniels MD    benzonatate (TESSALON PERLES) capsule 100 mg, 100 mg, Oral, TID PRN, Faheem Daniels MD    benztropine (COGENTIN) injection 1 mg, 1 mg, Intramuscular, Q8H PRN, Faheem Daniels MD    cloZAPine (CLOZARIL) tablet 25 mg, 25 mg, Oral, BID, Faheem Daniels MD, 25 mg at 11/23/19 2126    cloZAPine (CLOZARIL) tablet 50 mg, 50 mg, Oral, BID, Faheem Daniels MD, 50 mg at 11/23/19 2126    EPINEPHrine PF (ADRENALIN) 1 mg/mL injection 0 15 mg, 0 15 mg, Intramuscular, Once PRN, Faheem Daniels MD    fluticasone-vilanterol (BREO ELLIPTA) 200-25 MCG/INH inhaler 1 puff, 1 puff, Inhalation, Daily, Faheem Daniels MD, 1 puff at 11/24/19 0900    ketotifen (ZADITOR) 0 025 % ophthalmic solution 1 drop, 1 drop, Right Eye, BID PRN, Faheem Daniels MD    levothyroxine tablet 125 mcg, 125 mcg, Oral, Early Morning, Faheem Daniels MD, 125 mcg at 11/24/19 0557    magnesium hydroxide (MILK OF MAGNESIA) 400 mg/5 mL oral suspension 30 mL, 30 mL, Oral, Daily PRN, Faheem Daniels MD    montelukast (SINGULAIR) tablet 10 mg, 10 mg, Oral, HS, Faheem Daniels MD, 10 mg at 11/12/19 2141    OLANZapine (ZyPREXA) IM injection 5 mg, 5 mg, Intramuscular, Q8H PRN, Faheem Daniels MD    OLANZapine (ZyPREXA) tablet 5 mg, 5 mg, Oral, Q8H PRN, Faheem Daniels MD    ondansetron (ZOFRAN-ODT) dispersible tablet 4 mg, 4 mg, Oral, Q6H PRN, Faheem Daniels MD, 4 mg at 11/09/19 1223    pantoprazole (PROTONIX) EC tablet 40 mg, 40 mg, Oral, Early Morning, Faheem Daniels MD, 40 mg at 11/24/19 0557    polyethylene glycol (MIRALAX) packet 17 g, 17 g, Oral, Daily PRN, Faheem Daniels MD    polyvinyl alcohol (LIQUIFILM TEARS) 1 4 % ophthalmic solution 1 drop, 1 drop, Both Eyes, Q3H PRN, Faheem Daniels MD    sertraline (ZOLOFT) tablet 50 mg, 50 mg, Oral, BID, Isidoro Gonzalez MD, 50 mg at 11/24/19 9758    sucralfate (CARAFATE) oral suspension 1,000 mg, 1,000 mg, Oral, BID, Arin Parker MD, 1,000 mg at 11/23/19 1600    theophylline (JEF-24) 24 hr capsule 200 mg, 200 mg, Oral, Daily, Isidoro Gonzalez MD, 200 mg at 11/24/19 6032    tiotropium (SPIRIVA) capsule for inhaler 18 mcg, 18 mcg, Inhalation, Daily, Isidoro Gonzalez MD, 18 mcg at 11/24/19 0900    traZODone (DESYREL) tablet 25 mg, 25 mg, Oral, HS PRN, Isidoro Gonzalez MD  Justification if on more than two antipsychotics:  Only on his clozapine  Side effects if any:  None    Risks , benefits, side effects and precautions of medications discussed with patient and reminded patient to let us know any problems with medications     Objective:     Vital Signs:  Vitals:    11/23/19 1610 11/23/19 1943 11/24/19 0700 11/24/19 0705   BP: 108/77 109/71 119/59 90/52   BP Location: Left arm Left arm Left arm Left arm   Pulse: 88 85 86 62   Resp: 18 18 17 17   Temp: 98 1 °F (36 7 °C) 97 9 °F (36 6 °C) 98 2 °F (36 8 °C)    TempSrc: Temporal Temporal Temporal    SpO2: 95% 93%     Weight:   65 6 kg (144 lb 9 6 oz)    Height:         Appearance:  age appropriate, casually dressed and overweight older than stated age, uncombed long grey hair, disheveled unkempt with a body odor wearing the same clothing for days   Behavior:  evasive and guarded and suspicious and preoccupied with psychosomatic complaint and argumentative refusing to take showers and appearing fearful   Speech:  normal pitch and normal volume but circumstantial and tangential with stilted speech off and on   Mood:  anxious and dysthymic   Affect:  mood-congruent, elated and entitled and irritated and sad at times and anxious   Thought Process:  goal directed and illogical slightly pressured and tangential and talks as if in a court of law   Thought Content:  delusions  grandiose and somatic believing she cannot breathe or cannot swallow or having low blood sugar or having cancer and going to die if she takes a shower etc   No current suicidal homicidal thoughts intent or plans reported  No phobias obsessions or compulsions reported  Also accuses staff of tampering with her spirometer and oxygen concentrator as well as with her medication off and on   Perceptual Disturbances: None and does not appear responding to internal stimuli at times   Risk Potential: Tendency to not care for herself if she goes off treatment   Sensorium:  person, place, time/date, situation, day of week, month of year and time   Cognition:  grossly intact   Consciousness:  alert and awake    Attention: Intact concentration and attention span   Intellect: Considered to be at least of average intelligence   Insight:  limited and in denial of her illness   Judgment: poor      Motor Activity: no abnormal movements         Recent Labs:  Results Reviewed     None          I/O Past 24 hours:  No intake/output data recorded  No intake/output data recorded          Assessment / Plan:     [facility]ffective [facility]      Reason for continued inpatient care:  Because of underlying paranoia and refusal to go back to the personal care home and inability to care for herself on her own  Acceptance by patient:  Accepting  Claude Ground in recovery:  About living in another personal care home once she feels better  Understanding of medications :  Has some understanding  Involved in reintegration process:  Adjusting to the unit  Trusting in relatoinship with psychiatrist:  Trusting    Recommended Treatment:    Medication changes:  1) Zoloft increased yesterday as 50 mg twice a day  Non-pharmacological treatments  1) continue with  individual therapy group therapy, milieu therapy and occupational therapy and milieu therapy involving multidisciplinary team approach with psychotherapist, case management, nursing, peer support services, art therapist etc using recovery principles and psycho-education about accepting illness and need for treatment   3) encourage to attend to ADLs like taking showers and wearing clean clothes   4) Encourage to attend groups   Safety  1) Safety/communication plan established targeting dynamic risk factors above  Discharge Plan most likely back to the a:  Personal care boarding home with act services once her delusions are managed and mood becomes more stabilized and she becomes more receptive to treatment compliance    Counseling / Coordination of Care    Total floor / unit time spent today 15 minutes  Greater than 50% of total time was spent with the patient and / or family counseling and / or coordination of care  A description of the counseling / coordination of care  Patient's Rights, confidentiality and exceptions to confidentiality, use of automated medical record, [facility] staff access to medical record, and consent to treatment reviewed      Zander Yanez MD

## 2019-11-24 NOTE — PLAN OF CARE
Problem: Alteration in Thoughts and Perception  Goal: Verbalize thoughts and feelings  Description  Interventions:  - Promote a nonjudgmental and trusting relationship with the patient through active listening and therapeutic communication  - Assess patient's level of functioning, behavior and potential for risk  - Engage patient in 1 on 1 interactions for a minimum of 15 minutes each session  - Encourage patient to express fears, feelings, frustrations, and discuss symptoms    - [place] patient to reality, help patient recognize reality-based thinking   - Administer medications as ordered and assess for potential side effects  - Provide the patient education related to the signs and symptoms of the illness and desired effects of prescribed medications  Outcome: Progressing  Goal: Agree to be compliant with medication regime, as prescribed and report medication side effects  Description  Interventions:  - Offer appropriate PRN medication and supervise ingestion; conduct aims, as needed   Outcome: Progressing     Problem: Alteration in Thoughts and Perception  Goal: Attend and participate in unit activities, including therapeutic, recreational, and educational groups  Description  Interventions:  - Provide therapeutic and educational activities daily, encourage attendance and participation, and document same in the medical record     CERTIFIED PEER SPECIALIST INTERVENTIONS:    Complete peer assessment with patient to assess their needs and identify their goals to complete while in the recovery program as well as once discharged into the community  Patient will complete WRAP Plan, Crisis Plan and 5 Life Domains  Patient will attend 50% of groups offered on the unit  Patient will complete a goal card weekly      Outcome: Not Progressing  Goal: Recognize dysfunctional thoughts, communicate reality-based thoughts at the time of discharge  Description  Interventions:  - Provide medication and psycho-education to assist patient in compliance and developing insight into his/her illness   Outcome: Not Progressing  Goal: Complete daily ADLs, including personal hygiene independently, as able  Description  Interventions:  - Observe, teach, and assist patient with ADLS  - Monitor and promote a balance of rest/activity, with adequate nutrition and elimination   Outcome: Not Progressing     Problem: Ineffective Coping  Goal: Demonstrates healthy coping skills  Outcome: Not Progressing     [address] has been obsessed w/her belief that needs an accu check done because of her poor eating  Has been relentless in demanding this; plants herself @ phone chair in [place] when it is not in use so can make this request over & over of any staff she encounters  Presented to her she is not a diabetic, shows no S&S of hypoglycemia, vitals are stable, actually, improved for her  "I'm weak!" Presented to her anyone will feel weak if eating as poorly as she  [facility] be called  "This IS an emergency!" was her retort when this nurse told her there is no emergency justifying calling them before Monday  For meal had bites of mashed potato, juice, milk, coffee  For HS snack made a 'shake' of yogurt & milk mixed  Admits is not doing the suggestions made to her, is not following doctor directive to, for example, move about more  Said wanted to attend the [employer], but, feared will pass out if walked down the [place]  Adamantly refused hygiene unless the accu check was first done  Annoyed when accu checks repeatedly refused  Did take prescribed medicines except the Singulair  Presented to her that investing into this program instead, even 1/4 of all the energy she expelled tonight demanding accu checks, might have net her discharge by now  Again reinforced w/her need to expand her focus outside of physical condition  This gave her pause  Did do the Incentive Spirometry w/volumes of 1000-1250ml   Wearing now the QHS humidified nasal O2 @ 1L for bed

## 2019-11-24 NOTE — PLAN OF CARE
Problem: Alteration in Thoughts and Perception  Goal: Agree to be compliant with medication regime, as prescribed and report medication side effects  Description  Interventions:  - Offer appropriate PRN medication and supervise ingestion; conduct aims, as needed   Outcome: Progressing     Problem: Alteration in Thoughts and Perception  Goal: Attend and participate in unit activities, including therapeutic, recreational, and educational groups  Description  Interventions:  - Provide therapeutic and educational activities daily, encourage attendance and participation, and document same in the medical record     CERTIFIED PEER SPECIALIST INTERVENTIONS:    Complete peer assessment with patient to assess their needs and identify their goals to complete while in the recovery program as well as once discharged into the community  Patient will complete WRAP Plan, Crisis Plan and 5 Life Domains  Patient will attend 50% of groups offered on the unit  Patient will complete a goal card weekly  Outcome: Not Progressing  Goal: Complete daily ADLs, including personal hygiene independently, as able  Description  Interventions:  - Observe, teach, and assist patient with ADLS  - Monitor and promote a balance of rest/activity, with adequate nutrition and elimination   Outcome: Not Progressing     Problem: Depression  Goal: Refrain from isolation  Description  Interventions:  - Develop a trusting relationship   - Encourage socialization   Outcome: Not Progressing  Goal: Refrain from self-neglect  Outcome: Not Progressing     Pt withdrawn and isolative to room and self other than for meds and meals  Pt compliant with morning meds  Pt continues to refuse to shower, stating that she cant do it today  No somatic complaints throughout shift  Pt ate 25% of breakfast and lunch, refused group this am  Will continue to monitor

## 2019-11-24 NOTE — PROGRESS NOTES
Patient sleeping with O2 on at 1L, no distress noted   Safe and fall precautions in place and monitoring continues

## 2019-11-24 NOTE — PROGRESS NOTES
Progress Note - Lizbeth Jhaveri 1955, 60 y o  female MRN: [id number]    [facility] Encounter: [id number]    Primary Care Provider: Alonso Carmona PA-C   Date and time admitted to hospital: 7/23/2019  5:30 PM        * Schizoaffective disorder, bipolar type [facility])  Assessment & Plan  Psychiatry Progress Note  Patient is again argumentative about having low blood sugar and was demanding to get her blood sugar checked yesterday and the staff refused to do so as she has psychosomatic and there is no documented hypoglycemia diagnosis at all  She has not taken a shower in more than a week now and has not used to shampoo in more than 2 weeks now and remains disheveled unkempt with a body odor when approached  She continues to verbalize paranoia about certain staff tampering with her medications which has not changed   She continues to believe the she has some terrible illness like cancer that we are not telling her   Still psychosomatic in claiming that she cannot swallow or breathe properly and feels weekend has hypoglycemia and has terrible illness like cancer etc   Still refusing to attend any groups and prefers to lay back in bed but gets up for meals and for medications  No current suicidal homicidal thoughts intent or plans reported  No overt hallucinations or other delusions reported other than somatic delusions and paranoia  No signs or sxs of agranulocytosis or myocarditis or endocarditis and she is moving her bowels so far with no issues   She was again reminded to work with the program and work towards going back to the [facility] and start attending groups at least 25% of the time we can look into discharging her back to the personal care home but she remains passive-aggressive and refuses to do anything to help herself or increase group attendance or attend to her ADLs even with reminders and has not been complying with the behavior plan either    Has been compliant with medications including the recent increase in Zoloft      Current medications:    Current Facility-Administered Medications:     acetaminophen (TYLENOL) tablet 650 mg, 650 mg, Oral, Q6H PRN, Carissa Willis MD    albuterol (PROVENTIL HFA,VENTOLIN HFA) inhaler 2 puff, 2 puff, Inhalation, Q4H PRN, Carissa Willis MD, 2 puff at 10/11/19 0424    aluminum-magnesium hydroxide-simethicone (MYLANTA) 200-200-20 mg/5 mL oral suspension 15 mL, 15 mL, Oral, Q4H PRN, Carissa Willis MD    ammonium lactate (LAC-HYDRIN) 12 % lotion 1 application, 1 application, Topical, BID PRN, Carissa Willis MD    benzonatate (TESSALON PERLES) capsule 100 mg, 100 mg, Oral, TID PRN, Carissa Willis MD    benztropine (COGENTIN) injection 1 mg, 1 mg, Intramuscular, Q8H PRN, Carissa Willis MD    cloZAPine (CLOZARIL) tablet 25 mg, 25 mg, Oral, BID, Carissa Willis MD, 25 mg at 11/23/19 2126    cloZAPine (CLOZARIL) tablet 50 mg, 50 mg, Oral, BID, Carissa Willis MD, 50 mg at 11/23/19 2126    EPINEPHrine PF (ADRENALIN) 1 mg/mL injection 0 15 mg, 0 15 mg, Intramuscular, Once PRN, Carissa Willis MD    fluticasone-vilanterol (BREO ELLIPTA) 200-25 MCG/INH inhaler 1 puff, 1 puff, Inhalation, Daily, Carissa Willis MD, 1 puff at 11/24/19 0900    ketotifen (ZADITOR) 0 025 % ophthalmic solution 1 drop, 1 drop, Right Eye, BID PRN, Carissa Willis MD    levothyroxine tablet 125 mcg, 125 mcg, Oral, Early Morning, Carissa Willis MD, 125 mcg at 11/24/19 0557    magnesium hydroxide (MILK OF MAGNESIA) 400 mg/5 mL oral suspension 30 mL, 30 mL, Oral, Daily PRN, Carissa Willis MD    montelukast (SINGULAIR) tablet 10 mg, 10 mg, Oral, HS, Carissa Willis MD, 10 mg at 11/12/19 2141    OLANZapine (ZyPREXA) IM injection 5 mg, 5 mg, Intramuscular, Q8H PRN, Carissa Willis MD    OLANZapine (ZyPREXA) tablet 5 mg, 5 mg, Oral, Q8H PRN, Carissa Willis MD    ondansetron (ZOFRAN-ODT) dispersible tablet 4 mg, 4 mg, Oral, Q6H PRN, Carissa Willis MD, 4 mg at 11/09/19 1223    pantoprazole (PROTONIX) EC tablet 40 mg, 40 mg, Oral, Early Morning, Ervin Little MD, 40 mg at 11/24/19 0557    polyethylene glycol (MIRALAX) packet 17 g, 17 g, Oral, Daily PRN, Ervin Little MD    polyvinyl alcohol (LIQUIFILM TEARS) 1 4 % ophthalmic solution 1 drop, 1 drop, Both Eyes, Q3H PRN, Ervin Little MD    sertraline (ZOLOFT) tablet 50 mg, 50 mg, Oral, BID, Ervin Little MD, 50 mg at 11/24/19 9705    sucralfate (CARAFATE) oral suspension 1,000 mg, 1,000 mg, Oral, BID, Ayah Amador MD, 1,000 mg at 11/23/19 1600    theophylline (JEF-24) 24 hr capsule 200 mg, 200 mg, Oral, Daily, Ervin Little MD, 200 mg at 11/24/19 9987    tiotropium (SPIRIVA) capsule for inhaler 18 mcg, 18 mcg, Inhalation, Daily, Ervin Little MD, 18 mcg at 11/24/19 0900    traZODone (DESYREL) tablet 25 mg, 25 mg, Oral, HS PRN, Ervin Little MD  Justification if on more than two antipsychotics:  Only on his clozapine  Side effects if any:  None    Risks , benefits, side effects and precautions of medications discussed with patient and reminded patient to let us know any problems with medications     Objective:     Vital Signs:  Vitals:    11/23/19 1610 11/23/19 1943 11/24/19 0700 11/24/19 0705   BP: 108/77 109/71 119/59 90/52   BP Location: Left arm Left arm Left arm Left arm   Pulse: 88 85 86 62   Resp: 18 18 17 17   Temp: 98 1 °F (36 7 °C) 97 9 °F (36 6 °C) 98 2 °F (36 8 °C)    TempSrc: Temporal Temporal Temporal    SpO2: 95% 93%     Weight:   65 6 kg (144 lb 9 6 oz)    Height:         Appearance:  age appropriate, casually dressed and overweight older than stated age, uncombed long grey hair, disheveled unkempt with a body odor wearing the same clothing for days   Behavior:  evasive and guarded and suspicious and preoccupied with psychosomatic complaint and argumentative refusing to take showers and appearing fearful   Speech:  normal pitch and normal volume but circumstantial and tangential with stilted speech off and on   Mood:  anxious and dysthymic   Affect: mood-congruent, elated and entitled and irritated and sad at times and anxious   Thought Process:  goal directed and illogical slightly pressured and tangential and talks as if in a court of law   Thought Content:  delusions  grandiose and somatic believing she cannot breathe or cannot swallow or having low blood sugar or having cancer and going to die if she takes a shower etc   No current suicidal homicidal thoughts intent or plans reported  No phobias obsessions or compulsions reported  Also accuses staff of tampering with her spirometer and oxygen concentrator as well as with her medication off and on   Perceptual Disturbances: None and does not appear responding to internal stimuli at times   Risk Potential: Tendency to not care for herself if she goes off treatment   Sensorium:  person, place, time/date, situation, day of week, month of year and time   Cognition:  grossly intact   Consciousness:  alert and awake    Attention: Intact concentration and attention span   Intellect: Considered to be at least of average intelligence   Insight:  limited and in denial of her illness   Judgment: poor      Motor Activity: no abnormal movements         Recent Labs:  Results Reviewed     None          I/O Past 24 hours:  No intake/output data recorded  No intake/output data recorded          Assessment / Plan:     [facility]ffective [facility]      Reason for continued inpatient care:  Because of underlying paranoia and refusal to go back to the personal care home and inability to care for herself on her own  Acceptance by patient:  Accepting  Marianne Littlejohn in recovery:  About living in another personal care home once she feels better  Understanding of medications :  Has some understanding  Involved in reintegration process:  Adjusting to the unit  Trusting in relatoinship with psychiatrist:  Trusting    Recommended Treatment:    Medication changes:  1) Zoloft increased yesterday as 50 mg twice a day  Non-pharmacological treatments  1) continue with  individual therapy group therapy, milieu therapy and occupational therapy and milieu therapy involving multidisciplinary team approach with psychotherapist, case management, nursing, peer support services, art therapist etc using recovery principles and psycho-education about accepting illness and need for treatment   3) encourage to attend to ADLs like taking showers and wearing clean clothes   4) Encourage to attend groups   Safety  1) Safety/communication plan established targeting dynamic risk factors above  Discharge Plan most likely back to the a:  Personal care boarding home with [facility] once her delusions are managed and mood becomes more stabilized and she becomes more receptive to treatment compliance    Counseling / Coordination of Care    Total floor / unit time spent today 15 minutes  Greater than 50% of total time was spent with the patient and / or family counseling and / or coordination of care  A description of the counseling / coordination of care  Patient's Rights, confidentiality and exceptions to confidentiality, use of automated medical record, [facility] access to medical record, and consent to treatment reviewed      Oval MD Joseluis

## 2019-11-25 NOTE — PLAN OF CARE
Problem: Alteration in Thoughts and Perception  Goal: Verbalize thoughts and feelings  Description  Interventions:  - Promote a nonjudgmental and trusting relationship with the patient through active listening and therapeutic communication  - Assess patient's level of functioning, behavior and potential for risk  - Engage patient in 1 on 1 interactions for a minimum of 15 minutes each session  - Encourage patient to express fears, feelings, frustrations, and discuss symptoms    - [place] patient to reality, help patient recognize reality-based thinking   - Administer medications as ordered and assess for potential side effects  - Provide the patient education related to the signs and symptoms of the illness and desired effects of prescribed medications  Outcome: Progressing  Goal: Agree to be compliant with medication regime, as prescribed and report medication side effects  Description  Interventions:  - Offer appropriate PRN medication and supervise ingestion; conduct aims, as needed   Outcome: Progressing     Problem: Ineffective Coping  Goal: Patient/Family verbalizes awareness of resources  Outcome: Progressing  Goal: Understands least restrictive measures  Description  Interventions:  - Utilize least restrictive behavior  Outcome: Progressing     Problem: Risk for Self Injury/Neglect  Goal: Treatment Goal: Remain safe during length of stay, learn and adopt new coping skills, and be free of self-injurious ideation, impulses and acts at the time of discharge  Outcome: Progressing  Goal: Verbalize thoughts and feelings  Description  Interventions:  - Assess and re-assess patient's lethality and potential for self-injury  - Engage patient in 1:1 interactions, daily, for a minimum of 15 minutes  - Encourage patient to express feelings, fears, frustrations, hopes  - Establish rapport/trust with patient   Outcome: Progressing  Goal: Refrain from harming self  Description  Interventions:  - Monitor patient closely, per order  - Develop a trusting relationship  - Supervise medication ingestion, monitor effects and side effects   Outcome: Progressing     Problem: Depression  Goal: Verbalize thoughts and feelings  Description  Interventions:  - Assess and re-assess patient's level of risk   - Engage patient in 1:1 interactions, daily, for a minimum of 15 minutes   - Encourage patient to express feelings, fears, frustrations, hopes   Outcome: Progressing     Problem: Alteration in Orientation  Goal: Interact with staff daily  Description  Interventions:  - Assess and re-assess patient's level of orientation  - Engage patient in 1 on 1 interactions, daily, for a minimum of 15 minutes   - Establish rapport/trust with patient   Outcome: Progressing     Problem: PAIN - ADULT  Goal: Verbalizes/displays adequate comfort level or baseline comfort level  Description  Interventions:  - Encourage patient to monitor pain and request assistance  - Assess pain using appropriate pain scale  - Administer analgesics based on type and severity of pain and evaluate response  - Implement non-pharmacological measures as appropriate and evaluate response  - Consider cultural and social influences on pain and pain management  - Notify physician/advanced practitioner if interventions unsuccessful or patient reports new pain  Outcome: Progressing     Problem: SAFETY ADULT  Goal: Patient will remain free of falls  Description  INTERVENTIONS:  - Assess patient frequently for physical needs  -  Identify cognitive and physical deficits and behaviors that affect risk of falls    -  [place] fall precautions as indicated by assessment   - Educate patient/family on patient safety including physical limitations  - Instruct patient to call for assistance with activity based on assessment  - Modify environment to reduce risk of injury  - Consider OT/PT consult to assist with strengthening/mobility  Outcome: Progressing     Problem: Alteration in Thoughts and Perception  Goal: Treatment Goal: Gain control of psychotic behaviors/thinking, reduce/eliminate presenting symptoms and demonstrate improved reality functioning upon discharge  Outcome: Not Progressing  Goal: Attend and participate in unit activities, including therapeutic, recreational, and educational groups  Description  Interventions:  - Provide therapeutic and educational activities daily, encourage attendance and participation, and document same in the medical record     CERTIFIED PEER SPECIALIST INTERVENTIONS:    Complete peer assessment with patient to assess their needs and identify their goals to complete while in the recovery program as well as once discharged into the community  Patient will complete WRAP Plan, Crisis Plan and 5 Life Domains  Patient will attend 50% of groups offered on the unit  Patient will complete a goal card weekly      Outcome: Not Progressing  Goal: Recognize dysfunctional thoughts, communicate reality-based thoughts at the time of discharge  Description  Interventions:  - Provide medication and psycho-education to assist patient in compliance and developing insight into his/her illness   Outcome: Not Progressing  Goal: Complete daily ADLs, including personal hygiene independently, as able  Description  Interventions:  - Observe, teach, and assist patient with ADLS  - Monitor and promote a balance of rest/activity, with adequate nutrition and elimination   Outcome: Not Progressing     Problem: Ineffective Coping  Goal: Participates in unit activities  Description  Interventions:  - Provide therapeutic environment   - Provide required programming   - Redirect inappropriate behaviors   Outcome: Not Progressing     Problem: Risk for Self Injury/Neglect  Goal: Attend and participate in unit activities, including therapeutic, recreational, and educational groups  Description  Interventions:  - Provide therapeutic and educational activities daily, encourage attendance and participation, and document same in the medical record  - Obtain collateral information, encourage visitation and family involvement in care   Outcome: Not Progressing  Goal: Recognize maladaptive responses and adopt new coping mechanisms  Outcome: Not Progressing  Goal: Complete daily ADLs, including personal hygiene independently, as able  Description  Interventions:  - Observe, teach, and assist patient with ADLS  - Monitor and promote a balance of rest/activity, with adequate nutrition and elimination  Outcome: Not Progressing     Problem: Depression  Goal: Treatment Goal: Demonstrate behavioral control of depressive symptoms, verbalize feelings of improved mood/affect, and adopt new coping skills prior to discharge  Outcome: Not Progressing  Goal: Refrain from isolation  Description  Interventions:  - Develop a trusting relationship   - Encourage socialization   Outcome: Not Progressing  Goal: Refrain from self-neglect  Outcome: Not Progressing     Problem: Nutrition/Hydration-ADULT  Goal: Nutrient/Hydration intake appropriate for improving, restoring or maintaining nutritional needs  Description  Monitor and assess patient's nutrition/hydration status for malnutrition  Collaborate with interdisciplinary team and initiate plan and interventions as ordered  Monitor patient's weight and dietary intake as ordered or per policy  Utilize nutrition screening tool and intervene as necessary  Determine patient's food preferences and provide high-protein, high-caloric foods as appropriate       INTERVENTIONS:  - Monitor oral intake, urinary output, labs, and treatment plans  - Assess nutrition and hydration status and recommend course of action  - Evaluate amount of meals eaten  - Assist patient with eating if necessary   - Allow adequate time for meals  - Recommend/ encourage appropriate diets, oral nutritional supplements, and vitamin/mineral supplements  - Order, calculate, and assess calorie counts as needed  - Recommend, monitor, and adjust tube feedings and TPN/PPN based on assessed needs  - Assess need for intravenous fluids  - Provide specific nutrition/hydration education as appropriate  - Include patient/family/caregiver in decisions related to nutrition  Outcome: Not Progressing   The patient was isolative to her bed with the exception of coming out for meds and meals  Ate poorly - 0% breakfast, 15% lunch  Refused to shower or do hygiene  Refused incentive spirometer  Non-compliant with staff and doctor recommendations  No other behaviors or issues noted  Continue to monitor

## 2019-11-25 NOTE — PROGRESS NOTES
11/25/19 0900   Team Meeting   Meeting Type Daily Rounds   Team Members Present   Team Members Present Physician;Nurse;; Other (Discipline and Name)   Physician Team Member Dr Colton Nava Team Member Aba Trinidad RN   Care Management Team Member Brenda Vargas   Other (Discipline and Name) Jarred Schmidt, JONATHON     Patient reported she was too weak and sick to attend to her hygiene over the weekend  Last time hair was washed was on 11/09 and last shower was 11/16  Slept

## 2019-11-25 NOTE — PROGRESS NOTES
Progress Note - Selina Simon 1955, 60 y o  female MRN: [id number]    Unit/Bed#: [facility] 110-02 Encounter: [id number]    Primary Care Provider: Judith Morrissey PA-C   Date and time admitted to hospital: 7/23/2019  5:30 PM        * Schizoaffective disorder, bipolar type [facility])  Assessment & Plan  Psychiatry Progress Note  Patient remains psychosomatic and there is no documented hypoglycemia diagnosis but had a few incidents of low but blood sugar and she was hanging on to that claiming that she was going to faint and was suspicious of the motives of the staff and felt that the staff were laughing at her   She has not taken a shower in more than a week now and has not used to shampoo in more than 2 weeks now and remains disheveled unkempt with a body odor when approached refuses to comply with any request to take a shower or change her clothes  She continues to verbalize paranoia about certain staff tampering with her medications and with her spirometer and oxygen concentrator   Still claims that she cannot swallow or breathe properly and  has hypoglycemia and has terrible illness like cancer etc   Still refusing to attend most of groups but it appears that she has attended about 25% of groups which is a big improvement but she still needs to attend to her ADLs skills before we can give her privileges to get off the unit  She prefers to lay back in bed but gets up for meals and for medications  No current suicidal homicidal thoughts intent or plans reported  No overt hallucinations or other delusions reported other than somatic delusions and paranoia  No signs or sxs of agranulocytosis or myocarditis or endocarditis and she is moving her bowels so far with no issues     She was again reminded to work with the program and work towards going back to the [facility] and start attending groups at least 25% of the time we can look into discharging her back to the personal care home but she remains passive-aggressive and refuses to do anything to help herself like attending to her ADLs even with reminders and has not been complying with the behavior plan either  Has been compliant with medications including the recent increase in Zoloft so for      Current medications:    Current Facility-Administered Medications:     acetaminophen (TYLENOL) tablet 650 mg, 650 mg, Oral, Q6H PRN, Sandhya Bolaños MD    albuterol (PROVENTIL HFA,VENTOLIN HFA) inhaler 2 puff, 2 puff, Inhalation, Q4H PRN, Sandhya Bolaños MD, 2 puff at 10/11/19 0424    aluminum-magnesium hydroxide-simethicone (MYLANTA) 200-200-20 mg/5 mL oral suspension 15 mL, 15 mL, Oral, Q4H PRN, Sandhya Bolaños MD    ammonium lactate (LAC-HYDRIN) 12 % lotion 1 application, 1 application, Topical, BID PRN, Sandhya Bolaños MD    benzonatate (TESSALON PERLES) capsule 100 mg, 100 mg, Oral, TID PRN, Sandhya Bolaños MD    benztropine (COGENTIN) injection 1 mg, 1 mg, Intramuscular, Q8H PRN, Sandhya Bolaños MD    cloZAPine (CLOZARIL) tablet 25 mg, 25 mg, Oral, BID, Sandhya Bolaños MD, 25 mg at 11/24/19 2128    cloZAPine (CLOZARIL) tablet 50 mg, 50 mg, Oral, BID, Sandhya Bolaños MD, 50 mg at 11/24/19 2128    EPINEPHrine PF (ADRENALIN) 1 mg/mL injection 0 15 mg, 0 15 mg, Intramuscular, Once PRN, Sandhya Bolaños MD    fluticasone-vilanterol (BREO ELLIPTA) 200-25 MCG/INH inhaler 1 puff, 1 puff, Inhalation, Daily, Sandhya Bolaños MD, 1 puff at 11/24/19 0900    ketotifen (ZADITOR) 0 025 % ophthalmic solution 1 drop, 1 drop, Right Eye, BID PRN, Sandhya Bolaños MD    levothyroxine tablet 125 mcg, 125 mcg, Oral, Early Morning, Sandhya Bolaños MD, 125 mcg at 11/25/19 0600    magnesium hydroxide (MILK OF MAGNESIA) 400 mg/5 mL oral suspension 30 mL, 30 mL, Oral, Daily PRN, Sandhya Bolaños MD    montelukast (SINGULAIR) tablet 10 mg, 10 mg, Oral, HS, Sandhya Bolaños MD, 10 mg at 11/12/19 2141    OLANZapine (ZyPREXA) IM injection 5 mg, 5 mg, Intramuscular, Q8H PRN, Sandhya Bolaños MD    OLANZapine (ZyPREXA) tablet 5 mg, 5 mg, Oral, Q8H PRN, Zander Yanez MD    ondansetron (ZOFRAN-ODT) dispersible tablet 4 mg, 4 mg, Oral, Q6H PRN, Zander Yanez MD, 4 mg at 11/09/19 1223    pantoprazole (PROTONIX) EC tablet 40 mg, 40 mg, Oral, Early Morning, Zander Yanez MD, 40 mg at 11/25/19 0600    polyethylene glycol (MIRALAX) packet 17 g, 17 g, Oral, Daily PRN, Zander Yanez MD    polyvinyl alcohol (LIQUIFILM TEARS) 1 4 % ophthalmic solution 1 drop, 1 drop, Both Eyes, Q3H PRN, Zander Yanez MD    sertraline (ZOLOFT) tablet 50 mg, 50 mg, Oral, BID, Zander Yanez MD, 50 mg at 11/24/19 2129    sucralfate (CARAFATE) oral suspension 1,000 mg, 1,000 mg, Oral, BID, Tomás Layne MD, 1,000 mg at 11/24/19 1602    theophylline (JEF-24) 24 hr capsule 200 mg, 200 mg, Oral, Daily, Zander Yanez MD, 200 mg at 11/24/19 4436    tiotropium (SPIRIVA) capsule for inhaler 18 mcg, 18 mcg, Inhalation, Daily, Zander Yanez MD, 18 mcg at 11/24/19 0900    traZODone (DESYREL) tablet 25 mg, 25 mg, Oral, HS PRN, Zander Yanez MD  Justification if on more than two antipsychotics:  Only on his clozapine  Side effects if any:  None    Risks , benefits, side effects and precautions of medications discussed with patient and reminded patient to let us know any problems with medications     Objective:     Vital Signs:  Vitals:    11/24/19 0700 11/24/19 0705 11/24/19 1605 11/24/19 1956   BP: 119/59 90/52 93/59 97/62   BP Location: Left arm Left arm Left arm Left arm   Pulse: 86 62 70 75   Resp: 17 17 18 18   Temp: 98 2 °F (36 8 °C)  99 1 °F (37 3 °C) 98 1 °F (36 7 °C)   TempSrc: Temporal  Temporal Temporal   SpO2:   93% 93%   Weight: 65 6 kg (144 lb 9 6 oz)      Height:         Appearance:  age appropriate, casually dressed and overweight older than stated age, uncombed long grey hair, disheveled unkempt with a body odor wearing the same clothing for days having taken no showers in over a week   Behavior:  evasive and guarded and suspicious and preoccupied with psychosomatic complaint and argumentative refusing to take showers and appearing fearful and anxious and accusatory towards staff   Speech:  normal pitch and normal volume but circumstantial and tangential with stilted speech off and on   Mood:  anxious and dysthymic   Affect:  mood-congruent, elated and entitled anxious and irritated and sad at times    Thought Process:  goal directed and illogical slightly pressured and tangential and talks as if in a court of law   Thought Content:  delusions  grandiose and somatic believing she cannot breathe or cannot swallow or having low blood sugar or having cancer and going to die if she takes a shower etc   Has some paranoia at that staff are laughing about her as well which is new  No current suicidal homicidal thoughts intent or plans reported  No phobias obsessions or compulsions reported  Also accuses staff of tampering with her medications and her spirometer and oxygen concentrator    Perceptual Disturbances: None and does not appear responding to internal stimuli at times   Risk Potential: Tendency to not care for herself if she goes off treatment   Sensorium:  person, place, time/date, situation, day of week, month of year and time   Cognition:  grossly intact   Consciousness:  alert and awake    Attention: Intact concentration and attention span   Intellect: Considered to be at least of average intelligence   Insight:  limited and in denial of her illness   Judgment: poor      Motor Activity: no abnormal movements         Recent Labs:  Results Reviewed     None          I/O Past 24 hours:  No intake/output data recorded  No intake/output data recorded          Assessment / Plan:     Schizoaffective disorder, bipolar type ([facility] )      Reason for continued inpatient care:  Because of underlying paranoia and refusal to go back to the personal care home and inability to care for herself on her own  Acceptance by patient:  Accepting  Hopefulness in recovery:  About living in another personal care home once she feels better  Understanding of medications :  Has some understanding  Involved in reintegration process:  Adjusting to the unit  Trusting in relatoinship with psychiatrist:  Trusting    Recommended Treatment:    Medication changes:  1) none today  Non-pharmacological treatments  1) continue with  individual therapy group therapy, milieu therapy and occupational therapy and milieu therapy involving multidisciplinary team approach with psychotherapist, case management, nursing, peer support services, art therapist etc using recovery principles and psycho-education about accepting illness and need for treatment   3) encourage to attend to ADLs like taking showers and wearing clean clothes   4) Encourage to attend groups   Safety  1) Safety/communication plan established targeting dynamic risk factors above  Discharge Plan most likely back to the a:  Personal care boarding home with act services once her delusions are managed and mood becomes more stabilized and she becomes more receptive to treatment compliance    Counseling / Coordination of Care    Total floor / unit time spent today 15 minutes  Greater than 50% of total time was spent with the patient and / or family counseling and / or coordination of care  A description of the counseling / coordination of care  Patient's Rights, confidentiality and exceptions to confidentiality, use of automated medical record, [address] [place] [address] staff access to medical record, and consent to treatment reviewed      Jaz Beasley MD

## 2019-11-25 NOTE — PLAN OF CARE
Problem: DISCHARGE PLANNING  Goal: Discharge to home or other facility with appropriate resources  Description  INTERVENTIONS:  - Conduct assessment to determine patient/family and health care team treatment goals, and need for post-acute services based on payer coverage, community resources, and patient preferences, and barriers to discharge  - Address psychosocial, clinical, and financial barriers to discharge as identified in assessment in conjunction with the patient/family and health care team  - Assist the patient in reintegration back into the community by removing barriers which may hinder a successful discharge once deemed stable  - Arrange appropriate level of post-acute services according to patient's needs and preference and payer coverage in collaboration with the physician and health care team  - Communicate with and update the patient/family, physician, and health care team regarding progress on the discharge plan  - Arrange appropriate transportation to post-acute venues    Outcome: Progressing     CM received phone call from patient's sister, Denny Young ([phone]), requesting update on patient  CM explained to Denny Young that patient unfortunately has not been caring for herself on the unit, noting her hygiene has been lacking with her last shower on 11/16 and last time she washed her hair was on 11/09  Denny Young reports she has been trying to get patient motivated, even suggesting to stop visits with her until she starts participating in the program  However, she does not feel even this will motivate her enough to start caring about her progress  Denny Young also provided some family history on [place] stating on her maternal side multiple family members suffer from mental health issues  Denny Young stated her great grandmother was in [facility] and completed suicide and three of their maternal uncles have psychiatric diagnoses; one uncle was in [facility] and then [facility] as well   Denny Young also stated that patient as a teenager was uncontrollable and her parents sent her away to a girls group home in [facility] when she was young  After patient had her son in her 19's, she suffered from post-partum depression which is where the majority of her symptoms started  Bee Sampson also noted that both of patient's divorces were initiated by her  Bee Sampson stated that patient would go off her meds and then become paranoid that her  was out to get her so she would initiated a divorce  Bee Sampson stated both times, her husbands did not want the divorce  CM encouraged Bee Sampson to call this CM if anything would come to mind to help motivate patient in participating in the program  CM will continue to follow patient's progress and assist with discharge planning needs

## 2019-11-25 NOTE — PROGRESS NOTES
Sleeping at this time, no s/s of distress  O2 on 1L via NC   Safe and fall precautions maintained, monitoring continues

## 2019-11-25 NOTE — PLAN OF CARE
Problem: Alteration in Thoughts and Perception  Goal: Verbalize thoughts and feelings  Description  Interventions:  - Promote a nonjudgmental and trusting relationship with the patient through active listening and therapeutic communication  - Assess patient's level of functioning, behavior and potential for risk  - Engage patient in 1 on 1 interactions for a minimum of 15 minutes each session  - Encourage patient to express fears, feelings, frustrations, and discuss symptoms    - [place] patient to reality, help patient recognize reality-based thinking   - Administer medications as ordered and assess for potential side effects  - Provide the patient education related to the signs and symptoms of the illness and desired effects of prescribed medications  Outcome: Progressing  Goal: Agree to be compliant with medication regime, as prescribed and report medication side effects  Description  Interventions:  - Offer appropriate PRN medication and supervise ingestion; conduct aims, as needed   Outcome: Progressing     Problem: Alteration in Thoughts and Perception  Goal: Attend and participate in unit activities, including therapeutic, recreational, and educational groups  Description  Interventions:  - Provide therapeutic and educational activities daily, encourage attendance and participation, and document same in the medical record     CERTIFIED PEER SPECIALIST INTERVENTIONS:    Complete peer assessment with patient to assess their needs and identify their goals to complete while in the recovery program as well as once discharged into the community  Patient will complete WRAP Plan, Crisis Plan and 5 Life Domains  Patient will attend 50% of groups offered on the unit  Patient will complete a goal card weekly      Outcome: Not Progressing  Goal: Recognize dysfunctional thoughts, communicate reality-based thoughts at the time of discharge  Description  Interventions:  - Provide medication and psycho-education to assist patient in compliance and developing insight into his/her illness   Outcome: Not Progressing  Goal: Complete daily ADLs, including personal hygiene independently, as able  Description  Interventions:  - Observe, teach, and assist patient with ADLS  - Monitor and promote a balance of rest/activity, with adequate nutrition and elimination   Outcome: Not Progressing     Problem: Ineffective Coping  Goal: Demonstrates healthy coping skills  Outcome: Not Progressing     Problem: Nutrition/Hydration-ADULT  Goal: Nutrient/Hydration intake appropriate for improving, restoring or maintaining nutritional needs  Description  Monitor and assess patient's nutrition/hydration status for malnutrition  Collaborate with interdisciplinary team and initiate plan and interventions as ordered  Monitor patient's weight and dietary intake as ordered or per policy  Utilize nutrition screening tool and intervene as necessary  Determine patient's food preferences and provide high-protein, high-caloric foods as appropriate  INTERVENTIONS:  - Monitor oral intake, urinary output, labs, and treatment plans  - Assess nutrition and hydration status and recommend course of action  - Evaluate amount of meals eaten  - Assist patient with eating if necessary   - Allow adequate time for meals  - Recommend/ encourage appropriate diets, oral nutritional supplements, and vitamin/mineral supplements  - Order, calculate, and assess calorie counts as needed  - Recommend, monitor, and adjust tube feedings and TPN/PPN based on assessed needs  - Assess need for intravenous fluids  - Provide specific nutrition/hydration education as appropriate  - Include patient/family/caregiver in decisions related to nutrition  Outcome: Not Progressing     [facility]  continues to insist needs accu check due to poor intake  Continues to assert is too weak & sick to address any hygiene   Did have juice, milk, coffee @ meal, but, no attempt to eat the solids; just stirred them around plate  Was given suggestions (I e  Mix the gravy in the mashed potato to further liquify it, but, no follow through)  She is willing to up her water intake to correct rise in temp (99 1), drop in BP (93/59)  Was also encouraged to move about more, stay up OOB, but, ignored, returned to bed after meal  Did take her supper medicine  Did use the Incentive Spirometer getting volumes of 1100-1250ml  Did not attend PM Group  Continues fixated on inability to swallow & wondering if diagnostic tests were misread   Also paranoia toward unfamiliar staff & female peer she thought was laughing about her; "She looked my way & gave me a shitty smile "

## 2019-11-25 NOTE — PLAN OF CARE
Problem: Alteration in Thoughts and Perception  Goal: Attend and participate in unit activities, including therapeutic, recreational, and educational groups  Description  Interventions:  - Provide therapeutic and educational activities daily, encourage attendance and participation, and document same in the medical record     CERTIFIED PEER SPECIALIST INTERVENTIONS:    Complete peer assessment with patient to assess their needs and identify their goals to complete while in the recovery program as well as once discharged into the community  Patient will complete WRAP Plan, Crisis Plan and 5 Life Domains  Patient will attend 50% of groups offered on the unit  Patient will complete a goal card    Outcome: Not Progressing   Did not completed Goal Card for the week of 11/25/19

## 2019-11-25 NOTE — PROGRESS NOTES
The patient emerged from her room at shift change, sat down in the chair by the phone across from the nurse station, and then proceeded to somatically complain to any and all staff who passed by her, insisting that she needed to have her blood sugar taken  Multiple staff members attempted to redirect her and explain to her that her psychosomatic complaints were not congruent with her outward presentation, but each time the patient would respond dismissively  Patient stated that she felt "light-headed" and it was pointed out to her that her blood pressure was 90/64 and she was encouraged to remain out of bed and drink water with which she has not been compliant  Ate poorly at dinner, only eating 25% of her meal consisting of her ice cream, milk, and juice  Since then has made multiple phone calls, complaining about her "serious dilemma" to her family as well as [facility]  No other behaviors noted  Continue to monitor

## 2019-11-25 NOTE — PROGRESS NOTES
2[address] Greg[address] Arsen did have an HS snack of two ice creams & cup of milk  Sought reassurance that this will hold her through the night, blood sugar will not plummet  Did take HS medicine except the Singulair  Wearing now her QHS humidified nasal O2 @ 1L for bed

## 2019-11-25 NOTE — ASSESSMENT & PLAN NOTE
Psychiatry Progress Note  Patient remains psychosomatic and there is no documented hypoglycemia diagnosis but had a few incidents of low but blood sugar and she was hanging on to that claiming that she was going to faint and was suspicious of the motives of the staff and felt that the staff were laughing at her   She has not taken a shower in more than a week now and has not used to shampoo in more than 2 weeks now and remains disheveled unkempt with a body odor when approached refuses to comply with any request to take a shower or change her clothes  She continues to verbalize paranoia about certain staff tampering with her medications and with her spirometer and oxygen concentrator   Still claims that she cannot swallow or breathe properly and  has hypoglycemia and has terrible illness like cancer etc   Still refusing to attend most of groups but it appears that she has attended about 25% of groups which is a big improvement but she still needs to attend to her ADLs skills before we can give her privileges to get off the unit  She prefers to lay back in bed but gets up for meals and for medications  No current suicidal homicidal thoughts intent or plans reported  No overt hallucinations or other delusions reported other than somatic delusions and paranoia  No signs or sxs of agranulocytosis or myocarditis or endocarditis and she is moving her bowels so far with no issues   She was again reminded to work with the program and work towards going back to the [facility] and start attending groups at least 25% of the time we can look into discharging her back to the personal care home but she remains passive-aggressive and refuses to do anything to help herself like attending to her ADLs even with reminders and has not been complying with the behavior plan either  Has been compliant with medications including the recent increase in Zoloft so for      Current medications:    Current Facility-Administered Medications:     acetaminophen (TYLENOL) tablet 650 mg, 650 mg, Oral, Q6H PRN, Deedee Muir MD    albuterol (PROVENTIL HFA,VENTOLIN HFA) inhaler 2 puff, 2 puff, Inhalation, Q4H PRN, Deedee Muri MD, 2 puff at 10/11/19 0424    aluminum-magnesium hydroxide-simethicone (MYLANTA) 200-200-20 mg/5 mL oral suspension 15 mL, 15 mL, Oral, Q4H PRN, Deedee Muir MD    ammonium lactate (LAC-HYDRIN) 12 % lotion 1 application, 1 application, Topical, BID PRN, Deedee Muir MD    benzonatate (TESSALON PERLES) capsule 100 mg, 100 mg, Oral, TID PRN, Deedee Muir MD    benztropine (COGENTIN) injection 1 mg, 1 mg, Intramuscular, Q8H PRN, Deedee Muir MD    cloZAPine (CLOZARIL) tablet 25 mg, 25 mg, Oral, BID, Deedee Muir MD, 25 mg at 11/24/19 2128    cloZAPine (CLOZARIL) tablet 50 mg, 50 mg, Oral, BID, Deedee Muir MD, 50 mg at 11/24/19 2128    EPINEPHrine PF (ADRENALIN) 1 mg/mL injection 0 15 mg, 0 15 mg, Intramuscular, Once PRN, Deedee Muir MD    fluticasone-vilanterol (BREO ELLIPTA) 200-25 MCG/INH inhaler 1 puff, 1 puff, Inhalation, Daily, Deedee Muir MD, 1 puff at 11/24/19 0900    ketotifen (ZADITOR) 0 025 % ophthalmic solution 1 drop, 1 drop, Right Eye, BID PRN, Deedee Muir MD    levothyroxine tablet 125 mcg, 125 mcg, Oral, Early Morning, Deedee Muir MD, 125 mcg at 11/25/19 0600    magnesium hydroxide (MILK OF MAGNESIA) 400 mg/5 mL oral suspension 30 mL, 30 mL, Oral, Daily PRN, Deedee Muir MD    montelukast (SINGULAIR) tablet 10 mg, 10 mg, Oral, HS, Deedee Muir MD, 10 mg at 11/12/19 2141    OLANZapine (ZyPREXA) IM injection 5 mg, 5 mg, Intramuscular, Q8H PRN, Deedee Muir MD    OLANZapine (ZyPREXA) tablet 5 mg, 5 mg, Oral, Q8H PRN, Deedee Muir MD    ondansetron (ZOFRAN-ODT) dispersible tablet 4 mg, 4 mg, Oral, Q6H PRN, Deedee Muir MD, 4 mg at 11/09/19 1223    pantoprazole (PROTONIX) EC tablet 40 mg, 40 mg, Oral, Early Morning, Deedee Muir MD, 40 mg at 11/25/19 0600    polyethylene glycol (MIRALAX) packet 17 g, 17 g, Oral, Daily PRN, Felisa Florence MD    polyvinyl alcohol (LIQUIFILM TEARS) 1 4 % ophthalmic solution 1 drop, 1 drop, Both Eyes, Q3H PRN, Felisa Florence MD    sertraline (ZOLOFT) tablet 50 mg, 50 mg, Oral, BID, Felisa Florence MD, 50 mg at 11/24/19 2129    sucralfate (CARAFATE) oral suspension 1,000 mg, 1,000 mg, Oral, BID, Jaiden Mcknight MD, 1,000 mg at 11/24/19 1602    theophylline (JEF-24) 24 hr capsule 200 mg, 200 mg, Oral, Daily, Felisa Florence MD, 200 mg at 11/24/19 2442    tiotropium (SPIRIVA) capsule for inhaler 18 mcg, 18 mcg, Inhalation, Daily, Felisa Florence MD, 18 mcg at 11/24/19 0900    traZODone (DESYREL) tablet 25 mg, 25 mg, Oral, HS PRN, Felisa Florence MD  Justification if on more than two antipsychotics:  Only on his clozapine  Side effects if any:  None    Risks , benefits, side effects and precautions of medications discussed with patient and reminded patient to let us know any problems with medications     Objective:     Vital Signs:  Vitals:    11/24/19 0700 11/24/19 0705 11/24/19 1605 11/24/19 1956   BP: 119/59 90/52 93/59 97/62   BP Location: Left arm Left arm Left arm Left arm   Pulse: 86 62 70 75   Resp: 17 17 18 18   Temp: 98 2 °F (36 8 °C)  99 1 °F (37 3 °C) 98 1 °F (36 7 °C)   TempSrc: Temporal  Temporal Temporal   SpO2:   93% 93%   Weight: 65 6 kg (144 lb 9 6 oz)      Height:         Appearance:  age appropriate, casually dressed and overweight older than stated age, uncombed long grey hair, disheveled unkempt with a body odor wearing the same clothing for days having taken no showers in over a week   Behavior:  evasive and guarded and suspicious and preoccupied with psychosomatic complaint and argumentative refusing to take showers and appearing fearful and anxious and accusatory towards staff   Speech:  normal pitch and normal volume but circumstantial and tangential with stilted speech off and on   Mood:  anxious and dysthymic   Affect:  mood-congruent, elated and entitled anxious and irritated and sad at times    Thought Process:  goal directed and illogical slightly pressured and tangential and talks as if in a court of law   Thought Content:  delusions  grandiose and somatic believing she cannot breathe or cannot swallow or having low blood sugar or having cancer and going to die if she takes a shower etc   Has some paranoia at that staff are laughing about her as well which is new  No current suicidal homicidal thoughts intent or plans reported  No phobias obsessions or compulsions reported  Also accuses staff of tampering with her medications and her spirometer and oxygen concentrator    Perceptual Disturbances: None and does not appear responding to internal stimuli at times   Risk Potential: Tendency to not care for herself if she goes off treatment   Sensorium:  person, place, time/date, situation, day of week, month of year and time   Cognition:  grossly intact   Consciousness:  alert and awake    Attention: Intact concentration and attention span   Intellect: Considered to be at least of average intelligence   Insight:  limited and in denial of her illness   Judgment: poor      Motor Activity: no abnormal movements         Recent Labs:  Results Reviewed     None          I/O Past 24 hours:  No intake/output data recorded  No intake/output data recorded          Assessment / Plan:     [facility]      Reason for continued inpatient care:  Because of underlying paranoia and refusal to go back to the personal care home and inability to care for herself on her own  Acceptance by patient:  Accepting  Pj Saldaña in recovery:  About living in another personal care home once she feels better  Understanding of medications :  Has some understanding  Involved in reintegration process:  Adjusting to the unit  Trusting in relatoinship with psychiatrist:  Trusting    Recommended Treatment:    Medication changes:  1) none today  Non-pharmacological treatments  1) continue with  individual therapy group therapy, milieu therapy and occupational therapy and milieu therapy involving multidisciplinary team approach with psychotherapist, case management, nursing, peer support services, art therapist etc using recovery principles and psycho-education about accepting illness and need for treatment   3) encourage to attend to ADLs like taking showers and wearing clean clothes   4) Encourage to attend groups   Safety  1) Safety/communication plan established targeting dynamic risk factors above  Discharge Plan most likely back to the a:  Personal care boarding home with act services once her delusions are managed and mood becomes more stabilized and she becomes more receptive to treatment compliance    Counseling / Coordination of Care    Total floor / unit time spent today 15 minutes  Greater than 50% of total time was spent with the patient and / or family counseling and / or coordination of care  A description of the counseling / coordination of care  Patient's Rights, confidentiality and exceptions to confidentiality, use of automated medical record, [facility] staff access to medical record, and consent to treatment reviewed      Jill Gayle MD

## 2019-11-25 NOTE — PLAN OF CARE
Problem: Alteration in Thoughts and Perception  Goal: Attend and participate in unit activities, including therapeutic, recreational, and educational groups  Description  Interventions:  - Provide therapeutic and educational activities daily, encourage attendance and participation, and document same in the medical record     CERTIFIED PEER SPECIALIST INTERVENTIONS:    Complete peer assessment with patient to assess their needs and identify their goals to complete while in the recovery program as well as once discharged into the community  Patient will complete WRAP Plan, Crisis Plan and 5 Life Domains  Patient will attend 50% of groups offered on the unit  Patient will complete a goal card weekly  11/25/2019 1249 by Bharathi Ramirez  Outcome: Not Progressing  11/25/2019 1005 by Bharathi Ramirez  Outcome: Not Progressing  Patient is not consistent with group attendance and does not participate in weekly goal cards  Patient has not completed anything that was given to her such as WRAP Plan, Life Domains or BH Relapse Prevention Plan

## 2019-11-26 NOTE — PROGRESS NOTES
Progress Note - Madison Miner 1955, 60 y o  female MRN: [id number]    Unit/Bed#: [facility] 110-02 Encounter: [id number]    Primary Care Provider: Poli Nguyen PA-C   Date and time admitted to hospital: 7/23/2019  5:30 PM        * Schizoaffective disorder, bipolar type [facility])  Assessment & Plan  Psychiatry Progress Note  Patient has yetto take a shower since 11/16/19 and remains psychosomatic and has not used to shampoo in more than 2 weeks now and remains disheveled unkempt with a body odor when approached with multiple somatic excuses not to comply with any request to take a shower or change her clothes  She continues to verbalize paranoia about certain staff tampering with her medications and with her spirometer and oxygen concentrator and somatic delusions about not able to swallow or breathe properly and  has hypoglycemia and has terrible illness like cancer etc   Still refusing to attend most of groups but it appears that she has attended about 29% of groups which is a big improvement but she still needs to attend to her ADLs skills before we can give her privileges to get off the unit  She prefers to lay back in bed but gets up for meals and for medications only  No current suicidal homicidal thoughts intent or plans reported  Because of her bad odor, we decided to talk to her as a team from the door rather than allow her to come to team meeting today     No signs or sxs of agranulocytosis or myocarditis or endocarditis and she is moving her bowels so far with no issues     She was again reminded to work with the program and work towards going back to the [facility] and start attending groups at least 25% of the time we can look into discharging her back to the personal care home but she remains passive-aggressive and refuses to do anything to help herself like attending to her ADLs even with reminders and has not been complying with the behavior plan either despite telling us she will take showers daily and does not   Has been compliant with medications including the recent increase in Zoloft so for and is again accusatory to staff    Current medications:    Current Facility-Administered Medications:     acetaminophen (TYLENOL) tablet 650 mg, 650 mg, Oral, Q6H PRN, Vincent Olivares MD    albuterol (PROVENTIL HFA,VENTOLIN HFA) inhaler 2 puff, 2 puff, Inhalation, Q4H PRN, Vincent Olivares MD, 2 puff at 10/11/19 0424    aluminum-magnesium hydroxide-simethicone (MYLANTA) 200-200-20 mg/5 mL oral suspension 15 mL, 15 mL, Oral, Q4H PRN, Vincent Olivares MD    ammonium lactate (LAC-HYDRIN) 12 % lotion 1 application, 1 application, Topical, BID PRN, Vincent Olivares MD    benzonatate (TESSALON PERLES) capsule 100 mg, 100 mg, Oral, TID PRN, Vincent Olivares MD    benztropine (COGENTIN) injection 1 mg, 1 mg, Intramuscular, Q8H PRN, Vincent Olivares MD    cloZAPine (CLOZARIL) tablet 25 mg, 25 mg, Oral, BID, Vincent Olivares MD, 25 mg at 11/25/19 2109    cloZAPine (CLOZARIL) tablet 50 mg, 50 mg, Oral, BID, Vincent Olivares MD, 50 mg at 11/25/19 2109    EPINEPHrine PF (ADRENALIN) 1 mg/mL injection 0 15 mg, 0 15 mg, Intramuscular, Once PRN, Vincent Olivares MD    fluticasone-vilanterol (BREO ELLIPTA) 200-25 MCG/INH inhaler 1 puff, 1 puff, Inhalation, Daily, Vincent Olivares MD, 1 puff at 11/26/19 0851    ketotifen (ZADITOR) 0 025 % ophthalmic solution 1 drop, 1 drop, Right Eye, BID PRN, Vincent Olivares MD    levothyroxine tablet 125 mcg, 125 mcg, Oral, Early Morning, Vincent Olivares MD, 125 mcg at 11/26/19 0605    magnesium hydroxide (MILK OF MAGNESIA) 400 mg/5 mL oral suspension 30 mL, 30 mL, Oral, Daily PRN, Vincent Olivares MD    montelukast (SINGULAIR) tablet 10 mg, 10 mg, Oral, HS, Vincent Olivares MD, 10 mg at 11/12/19 2141    OLANZapine (ZyPREXA) IM injection 5 mg, 5 mg, Intramuscular, Q8H PRN, Vincent Olivares MD    OLANZapine (ZyPREXA) tablet 5 mg, 5 mg, Oral, Q8H PRN, Vincent Olivares MD    ondansetron (ZOFRAN-ODT) dispersible tablet 4 mg, 4 mg, Oral, Q6H PRN, Vincent Olivares MD, 4 mg at 11/09/19 1223    pantoprazole (PROTONIX) EC tablet 40 mg, 40 mg, Oral, Early Morning, Vincent Olivares MD, 40 mg at 11/26/19 0605    polyethylene glycol (MIRALAX) packet 17 g, 17 g, Oral, Daily PRN, Vincent Olivares MD    polyvinyl alcohol (LIQUIFILM TEARS) 1 4 % ophthalmic solution 1 drop, 1 drop, Both Eyes, Q3H PRN, Vincent Olivares MD    sertraline (ZOLOFT) tablet 50 mg, 50 mg, Oral, BID, Vincent Olivares MD, 50 mg at 11/26/19 0851    sucralfate (CARAFATE) oral suspension 1,000 mg, 1,000 mg, Oral, BID, Temitope Peterson MD, 1,000 mg at 11/26/19 0604    theophylline (JEF-24) 24 hr capsule 200 mg, 200 mg, Oral, Daily, Vincent Olivares MD, 200 mg at 11/26/19 0851    tiotropium (SPIRIVA) capsule for inhaler 18 mcg, 18 mcg, Inhalation, Daily, Vincent Olivares MD, 18 mcg at 11/26/19 0851    traZODone (DESYREL) tablet 25 mg, 25 mg, Oral, HS PRN, Vincent Olivares MD  Justification if on more than two antipsychotics:  Only on his clozapine  Side effects if any:  None    Risks , benefits, side effects and precautions of medications discussed with patient and reminded patient to let us know any problems with medications     Objective:     Vital Signs:  Vitals:    11/25/19 0730 11/25/19 1606 11/25/19 2000 11/26/19 0700   BP: 141/65 90/64 92/70 120/70   BP Location: Left arm Left arm Left arm Right arm   Pulse: 91  90 74   Resp: 18 18 16 18   Temp: 97 7 °F (36 5 °C) 98 1 °F (36 7 °C) 97 6 °F (36 4 °C) 97 5 °F (36 4 °C)   TempSrc: Temporal Temporal Temporal    SpO2:  97% 94%    Weight:       Height:         Appearance:  age appropriate, casually dressed and overweight older than stated age, uncombed long grey hair, disheveled unkempt with a body odor wearing the same clothing for days    Behavior:  evasive and guarded and suspicious and preoccupied with psychosomatic complaints and argumentative refusing to take showers and appearing fearful and anxious and accusatory with multiple excuses for nottakingshowers   Speech:  normal pitch and normal volume but circumstantial and tangential with stilted speech off and on   Mood:  anxious and dysthymic   Affect:  mood-congruent, elated and entitled anxious and irritated and sad at times    Thought Process:  goal directed and illogical slightly pressured and tangential and talks as if in a court of law   Thought Content:  Delusional  believing she cannot breathe or cannot swallow or having low blood sugar or having cancer and going to die if she takes a shower etc   No current suicidal homicidal thoughts intent or plans reported  No phobias obsessions or compulsions reported  Also accuses staff of tampering with her medications and her spirometer and oxygen concentrator and  Now believes staff is laughing about her    Perceptual Disturbances: None and does not appear responding to internal stimuli at times   Risk Potential: Tendency to not care for herself    Sensorium:  person, place, time/date, situation, day of week, month of year and time   Cognition:  grossly intact   Consciousness:  alert and awake    Attention: Intact concentration and attention span   Intellect: Considered to be at least of average intelligence   Insight:  limited and in denial of her illness   Judgment: poor      Motor Activity: no abnormal movements         Recent Labs:  Results Reviewed     None          I/O Past 24 hours:  No intake/output data recorded  No intake/output data recorded          Assessment / Plan:     [facility]ffective [facility]      Reason for continued inpatient care:  Because of underlying paranoia and refusal to go back to the personal care home and inability to care for herself on her own  Acceptance by patient:  Accepting  Mateusz Mcghee in recovery:  About living in another personal care home once she feels better  Understanding of medications :  Has some understanding  Involved in reintegration process:  Adjusting to the unit  Trusting in relatoinship with psychiatrist:  Trusting    Recommended Treatment:    Medication changes:  1) none today  Non-pharmacological treatments  1) continue with  individual therapy group therapy, milieu therapy and occupational therapy and milieu therapy involving multidisciplinary team approach with psychotherapist, case management, nursing, peer support services, art therapist etc using recovery principles and psycho-education about accepting illness and need for treatment   2) encourage to cocoperate with behavior plan  3) encourage to attend to ADLs like taking showers and wearing clean clothes   4) Encourage to attend groups   Safety  1) Safety/communication plan established targeting dynamic risk factors above  Discharge Plan most likely back to the a:  Personal care boarding home with act services once her delusions are managed and mood becomes more stabilized and she becomes more receptive to treatment compliance    Counseling / Coordination of Care    Total floor / unit time spent today 15 minutes  Greater than 50% of total time was spent with the patient and / or family counseling and / or coordination of care  A description of the counseling / coordination of care  Patient's Rights, confidentiality and exceptions to confidentiality, use of automated medical record, [address] [place] [address] staff access to medical record, and consent to treatment reviewed      Zac Sierra MD

## 2019-11-26 NOTE — PLAN OF CARE
Problem: PAIN - ADULT  Goal: Verbalizes/displays adequate comfort level or baseline comfort level  Description  Interventions:  - Encourage patient to monitor pain and request assistance  - Assess pain using appropriate pain scale  - Administer analgesics based on type and severity of pain and evaluate response  - Implement non-pharmacological measures as appropriate and evaluate response  - Consider cultural and social influences on pain and pain management  - Notify physician/advanced practitioner if interventions unsuccessful or patient reports new pain  Outcome: Progressing     Problem: SAFETY ADULT  Goal: Patient will remain free of falls  Description  INTERVENTIONS:  - Assess patient frequently for physical needs  -  Identify cognitive and physical deficits and behaviors that affect risk of falls    -  [place] fall precautions as indicated by assessment   - Educate patient/family on patient safety including physical limitations  - Instruct patient to call for assistance with activity based on assessment  - Modify environment to reduce risk of injury  - Consider OT/PT consult to assist with strengthening/mobility  Outcome: Progressing     Problem: RESPIRATORY - ADULT  Goal: Achieves optimal ventilation and oxygenation  Description  INTERVENTIONS:  - Assess for changes in respiratory status  - Assess for changes in mentation and behavior  - Position to facilitate oxygenation and minimize respiratory effort  - Oxygen administration by appropriate delivery method based on oxygen saturation (per order) or ABGs  - Initiate smoking cessation education as indicated  - Encourage broncho-pulmonary hygiene including cough, deep breathe, Incentive Spirometry  - Assess the need for suctioning and aspirate as needed  - Assess and instruct to report SOB or any respiratory difficulty  - Respiratory Therapy support as indicated  Outcome: Progressing     Problem: SLEEP DISTURBANCE  Goal: Will exhibit normal sleeping pattern  Description  Interventions:  -  Assess the patients sleep pattern, noting recent changes  - Administer medication as ordered  - Decrease environmental stimuli, including noise, as appropriate during the night  - Encourage the patient to actively participate in unit groups and or exercise during the day to enhance ability to achieve adequate sleep at night  - Assess the patient, in the morning, encouraging a description of sleep experience  Outcome: Progressing    Laying in bed with eyes closed, breath even and unlabored  On O2 with humidifier @1L/m via nasal cannula  Q 15 minutes rounding  Maintained on fall and SAFE precaution  No somatic complaint overnight  No respiratory distress  Will continue to monitor

## 2019-11-26 NOTE — PROGRESS NOTES
Patient not required to attend      11/26/19 3586   Activity/Group Checklist   Group   (Community Programming Wrap up )   Attendance Did not attend   Attendance Duration (min) 16-30   Affect/Mood IRMA

## 2019-11-26 NOTE — PLAN OF CARE
Problem: Alteration in Thoughts and Perception  Goal: Verbalize thoughts and feelings  Description  Interventions:  - Promote a nonjudgmental and trusting relationship with the patient through active listening and therapeutic communication  - Assess patient's level of functioning, behavior and potential for risk  - Engage patient in 1 on 1 interactions for a minimum of 15 minutes each session  - Encourage patient to express fears, feelings, frustrations, and discuss symptoms    - [place] patient to reality, help patient recognize reality-based thinking   - Administer medications as ordered and assess for potential side effects  - Provide the patient education related to the signs and symptoms of the illness and desired effects of prescribed medications  Outcome: Progressing  Goal: Agree to be compliant with medication regime, as prescribed and report medication side effects  Description  Interventions:  - Offer appropriate PRN medication and supervise ingestion; conduct aims, as needed   Outcome: Progressing     Problem: Ineffective Coping  Goal: Patient/Family verbalizes awareness of resources  Outcome: Progressing  Goal: Understands least restrictive measures  Description  Interventions:  - Utilize least restrictive behavior  Outcome: Progressing     Problem: Risk for Self Injury/Neglect  Goal: Treatment Goal: Remain safe during length of stay, learn and adopt new coping skills, and be free of self-injurious ideation, impulses and acts at the time of discharge  Outcome: Progressing  Goal: Verbalize thoughts and feelings  Description  Interventions:  - Assess and re-assess patient's lethality and potential for self-injury  - Engage patient in 1:1 interactions, daily, for a minimum of 15 minutes  - Encourage patient to express feelings, fears, frustrations, hopes  - Establish rapport/trust with patient   Outcome: Progressing  Goal: Refrain from harming self  Description  Interventions:  - Monitor patient closely, per order  - Develop a trusting relationship  - Supervise medication ingestion, monitor effects and side effects   Outcome: Progressing     Problem: Depression  Goal: Verbalize thoughts and feelings  Description  Interventions:  - Assess and re-assess patient's level of risk   - Engage patient in 1:1 interactions, daily, for a minimum of 15 minutes   - Encourage patient to express feelings, fears, frustrations, hopes   Outcome: Progressing     Problem: Alteration in Orientation  Goal: Interact with staff daily  Description  Interventions:  - Assess and re-assess patient's level of orientation  - Engage patient in 1 on 1 interactions, daily, for a minimum of 15 minutes   - Establish rapport/trust with patient   Outcome: Progressing     Problem: PAIN - ADULT  Goal: Verbalizes/displays adequate comfort level or baseline comfort level  Description  Interventions:  - Encourage patient to monitor pain and request assistance  - Assess pain using appropriate pain scale  - Administer analgesics based on type and severity of pain and evaluate response  - Implement non-pharmacological measures as appropriate and evaluate response  - Consider cultural and social influences on pain and pain management  - Notify physician/advanced practitioner if interventions unsuccessful or patient reports new pain  Outcome: Progressing     Problem: SAFETY ADULT  Goal: Patient will remain free of falls  Description  INTERVENTIONS:  - Assess patient frequently for physical needs  -  Identify cognitive and physical deficits and behaviors that affect risk of falls    -  [place] fall precautions as indicated by assessment   - Educate patient/family on patient safety including physical limitations  - Instruct patient to call for assistance with activity based on assessment  - Modify environment to reduce risk of injury  - Consider OT/PT consult to assist with strengthening/mobility  Outcome: Progressing     Problem: Alteration in Thoughts and Perception  Goal: Treatment Goal: Gain control of psychotic behaviors/thinking, reduce/eliminate presenting symptoms and demonstrate improved reality functioning upon discharge  Outcome: Not Progressing  Goal: Attend and participate in unit activities, including therapeutic, recreational, and educational groups  Description  Interventions:  - Provide therapeutic and educational activities daily, encourage attendance and participation, and document same in the medical record     CERTIFIED PEER SPECIALIST INTERVENTIONS:    Complete peer assessment with patient to assess their needs and identify their goals to complete while in the recovery program as well as once discharged into the community  Patient will complete WRAP Plan, Crisis Plan and [facility] [employer] Domains  Patient will attend 50% of groups offered on the unit  Patient will complete a goal card weekly      Outcome: Not Progressing  Goal: Recognize dysfunctional thoughts, communicate reality-based thoughts at the time of discharge  Description  Interventions:  - Provide medication and psycho-education to assist patient in compliance and developing insight into his/her illness   Outcome: Not Progressing  Goal: Complete daily ADLs, including personal hygiene independently, as able  Description  Interventions:  - Observe, teach, and assist patient with ADLS  - Monitor and promote a balance of rest/activity, with adequate nutrition and elimination   Outcome: Not Progressing     Problem: Ineffective Coping  Goal: Identifies ineffective coping skills  Outcome: Not Progressing  Goal: Identifies healthy coping skills  Outcome: Not Progressing  Goal: Demonstrates healthy coping skills  Outcome: Not Progressing  Goal: Participates in unit activities  Description  Interventions:  - Provide therapeutic environment   - Provide required programming   - Redirect inappropriate behaviors   Outcome: Not Progressing     Problem: Risk for Self Injury/Neglect  Goal: Attend and participate in unit activities, including therapeutic, recreational, and educational groups  Description  Interventions:  - Provide therapeutic and educational activities daily, encourage attendance and participation, and document same in the medical record  - Obtain collateral information, encourage visitation and family involvement in care   Outcome: Not Progressing  Goal: Recognize maladaptive responses and adopt new coping mechanisms  Outcome: Not Progressing  Goal: Complete daily ADLs, including personal hygiene independently, as able  Description  Interventions:  - Observe, teach, and assist patient with ADLS  - Monitor and promote a balance of rest/activity, with adequate nutrition and elimination  Outcome: Not Progressing     Problem: Depression  Goal: Treatment Goal: Demonstrate behavioral control of depressive symptoms, verbalize feelings of improved mood/affect, and adopt new coping skills prior to discharge  Outcome: Not Progressing  Goal: Refrain from isolation  Description  Interventions:  - Develop a trusting relationship   - Encourage socialization   Outcome: Not Progressing  Goal: Refrain from self-neglect  Outcome: Not Progressing     Problem: Nutrition/Hydration-ADULT  Goal: Nutrient/Hydration intake appropriate for improving, restoring or maintaining nutritional needs  Description  Monitor and assess patient's nutrition/hydration status for malnutrition  Collaborate with interdisciplinary team and initiate plan and interventions as ordered  Monitor patient's weight and dietary intake as ordered or per policy  Utilize nutrition screening tool and intervene as necessary  Determine patient's food preferences and provide high-protein, high-caloric foods as appropriate       INTERVENTIONS:  - Monitor oral intake, urinary output, labs, and treatment plans  - Assess nutrition and hydration status and recommend course of action  - Evaluate amount of meals eaten  - Assist patient with eating if necessary - Allow adequate time for meals  - Recommend/ encourage appropriate diets, oral nutritional supplements, and vitamin/mineral supplements  - Order, calculate, and assess calorie counts as needed  - Recommend, monitor, and adjust tube feedings and TPN/PPN based on assessed needs  - Assess need for intravenous fluids  - Provide specific nutrition/hydration education as appropriate  - Include patient/family/caregiver in decisions related to nutrition  Outcome: Not Progressing   The patient was mostly isolative to her room today, coming out for meds and meals only, and did not attend any offered programming  Still refuses to shower with her last shower being 11/16 and her last hair wash being 11/9  Remains psychosomatic, fixating on her blood sugars and inability to swallow  All attempts at education and redirection continue to be unsuccessful  When talking with various staff members, she will admit that she knows these behaviors are psychosomatic but continues to believe she has actual physical problems  It is unclear if she is actually able to grasp what psychosomatic means  Today the dietician visited and spoke with her  During their conversation, the patient remained unwilling to talk about food choices and only fixated on getting ensure with her meals  Followed up with Zuleika Urrutia PA-C from gastroenterology in regards to her diet and per our discussion, she has now been switched back to a regular house diet  When patient found out about this when lunch arrived, she was upset  Explained to the patient that she was recently able to eat food items such as potato chips and cookies with no difficulty and that per all the testing that was done, there is no physiological reason that she cannot swallow and eat  Patient acknowledged but then attempted to refute despite verbalizing understanding, scrambling to find other excuses   Her fear of choking was acknowledged and encouragement was provided that staff would continue to work with her through the process of overcoming her fear of choking  Pointed out that patient needs to move forward with conquering her fears rather than remaining stagnant in her current state  Pointed out that staff continue to challenge her misconceptions and be firm with her for her benefit rather than to attack her or be mean to her as she has voiced is her belief  Also pointed out to her that she needs to do her part in recovery and that no progress can be made until she is willing to make the active choice to improve her wellness and recover  She acknowledged but followed up with "but it's hard, and I'm scared"  Further encouragement was provided and following the patient did agree to "try"  It was explained to her as well as relayed to staff that going forward the plan will be to document all that she eats so that her eating habits can be tracked and trended for evaluation of progress  She acknowledged and agreed  Today for breakfast she only drank the fluids and did not attempt to eat the food on her tray  For lunch she ate a cup of yogurt, an ice cream, and drank her fluids  Voiced feeling afraid that she would choke on the pulled pork, bun or potato wedges  Will continue to provide encouragement and assist the patient in overcoming her fear of choking  No other behaviors or issues noted  Continue to monitor

## 2019-11-26 NOTE — QUICK NOTE
Elsaext recieved via  from Britt[facility] Mir [facility] regarding [facility] Model  Patient continues to be psychosomatic and paranoid  Patient continues to complain of intermittent dysphagia but mostly with food she dislikes  She has no difficulty eating outside food brought in by family or desirable food from hospital kitchen regardless of consistency  Asked to liberalize her diet to regular, house diet in order to better trend eating patterns      Jorge Houston PA-C

## 2019-11-26 NOTE — PROGRESS NOTES
11/26/19 1100   Activity/Group Checklist   Group Other (Comment)  (Pet therapy and IMR)   Attendance Did not attend     Patient did not attend group today  Patient was encouraged to attend group, but declined

## 2019-11-26 NOTE — PROGRESS NOTES
11/26/19 0900   Team Meeting   Meeting Type Daily Rounds   Team Members Present   Team Members Present Physician;Nurse;; Other (Discipline and Name)   Physician Team Member Dr Ricardo Rivera Team Member Delphine Whiting RN   Care Management Team Member Brenda Sullivan   Other (Discipline and Name) Twan Richards LCSW     Patient last washed her hair on 11/09 and last showered on 11/16  Seeking special treatment from staff  Less somatic last night  Slept

## 2019-11-26 NOTE — ASSESSMENT & PLAN NOTE
Psychiatry Progress Note  Patient has yetto take a shower since 11/16/19 and remains psychosomatic and has not used to shampoo in more than 2 weeks now and remains disheveled unkempt with a body odor when approached with multiple somatic excuses not to comply with any request to take a shower or change her clothes  She continues to verbalize paranoia about certain staff tampering with her medications and with her spirometer and oxygen concentrator and somatic delusions about not able to swallow or breathe properly and  has hypoglycemia and has terrible illness like cancer etc   Still refusing to attend most of groups but it appears that she has attended about 29% of groups which is a big improvement but she still needs to attend to her ADLs skills before we can give her privileges to get off the unit  She prefers to lay back in bed but gets up for meals and for medications only  No current suicidal homicidal thoughts intent or plans reported  Because of her bad odor, we decided to talk to her as a team from the door rather than allow her to come to team meeting today     No signs or sxs of agranulocytosis or myocarditis or endocarditis and she is moving her bowels so far with no issues     She was again reminded to work with the program and work towards going back to the [facility] and start attending groups at least 25% of the time we can look into discharging her back to the personal care home but she remains passive-aggressive and refuses to do anything to help herself like attending to her ADLs even with reminders and has not been complying with the behavior plan either despite telling us she will take showers daily and does not   Has been compliant with medications including the recent increase in Zoloft so for and is again accusatory to staff    Current medications:    Current Facility-Administered Medications:     acetaminophen (TYLENOL) tablet 650 mg, 650 mg, Oral, Q6H PRN, [employer], MD    albuterol (PROVENTIL HFA,VENTOLIN HFA) inhaler 2 puff, 2 puff, Inhalation, Q4H PRN, Jeet Levine MD, 2 puff at 10/11/19 0424    aluminum-magnesium hydroxide-simethicone (MYLANTA) 200-200-20 mg/5 mL oral suspension 15 mL, 15 mL, Oral, Q4H PRN, Jeet Levine MD    ammonium lactate (LAC-HYDRIN) 12 % lotion 1 application, 1 application, Topical, BID PRN, Jeet Levine MD    benzonatate (TESSALON PERLES) capsule 100 mg, 100 mg, Oral, TID PRN, Jeet Levine MD    benztropine (COGENTIN) injection 1 mg, 1 mg, Intramuscular, Q8H PRN, Jeet Levine MD    cloZAPine (CLOZARIL) tablet 25 mg, 25 mg, Oral, BID, Jeet Levine MD, 25 mg at 11/25/19 2109    cloZAPine (CLOZARIL) tablet 50 mg, 50 mg, Oral, BID, Jeet Levine MD, 50 mg at 11/25/19 2109    EPINEPHrine PF (ADRENALIN) 1 mg/mL injection 0 15 mg, 0 15 mg, Intramuscular, Once PRN, Jeet Levine MD    fluticasone-vilanterol (BREO ELLIPTA) 200-25 MCG/INH inhaler 1 puff, 1 puff, Inhalation, Daily, Jeet Levine MD, 1 puff at 11/26/19 0851    ketotifen (ZADITOR) 0 025 % ophthalmic solution 1 drop, 1 drop, Right Eye, BID PRN, Jeet Levine MD    levothyroxine tablet 125 mcg, 125 mcg, Oral, Early Morning, Jeet Levine MD, 125 mcg at 11/26/19 0605    magnesium hydroxide (MILK OF MAGNESIA) 400 mg/5 mL oral suspension 30 mL, 30 mL, Oral, Daily PRN, Jeet Levine MD    montelukast (SINGULAIR) tablet 10 mg, 10 mg, Oral, HS, Jeet Levine MD, 10 mg at 11/12/19 2141    OLANZapine (ZyPREXA) IM injection 5 mg, 5 mg, Intramuscular, Q8H PRN, Jeet Levine MD    OLANZapine (ZyPREXA) tablet 5 mg, 5 mg, Oral, Q8H PRN, Jeet Levine MD    ondansetron (ZOFRAN-ODT) dispersible tablet 4 mg, 4 mg, Oral, Q6H PRN, Jeet Levine MD, 4 mg at 11/09/19 1223    pantoprazole (PROTONIX) EC tablet 40 mg, 40 mg, Oral, Early Morning, Jeet Levine MD, 40 mg at 11/26/19 0605    polyethylene glycol (MIRALAX) packet 17 g, 17 g, Oral, Daily PRN, Jeet Levine MD    polyvinyl alcohol (LIQUIFILM TEARS) 1 4 % ophthalmic solution 1 drop, 1 drop, Both Eyes, Q3H PRN, Meredith Wesley MD    sertraline (ZOLOFT) tablet 50 mg, 50 mg, Oral, BID, Meredith Wesley MD, 50 mg at 11/26/19 0851    sucralfate (CARAFATE) oral suspension 1,000 mg, 1,000 mg, Oral, BID, Stiven Pryor MD, 1,000 mg at 11/26/19 0604    theophylline (JEF-24) 24 hr capsule 200 mg, 200 mg, Oral, Daily, Meredith Wesley MD, 200 mg at 11/26/19 0851    tiotropium (SPIRIVA) capsule for inhaler 18 mcg, 18 mcg, Inhalation, Daily, Meredith Wesley MD, 18 mcg at 11/26/19 0851    traZODone (DESYREL) tablet 25 mg, 25 mg, Oral, HS PRN, Meredith Wesley MD  Justification if on more than two antipsychotics:  Only on his clozapine  Side effects if any:  None    Risks , benefits, side effects and precautions of medications discussed with patient and reminded patient to let us know any problems with medications     Objective:     Vital Signs:  Vitals:    11/25/19 0730 11/25/19 1606 11/25/19 2000 11/26/19 0700   BP: 141/65 90/64 92/70 120/70   BP Location: Left arm Left arm Left arm Right arm   Pulse: 91  90 74   Resp: 18 18 16 18   Temp: 97 7 °F (36 5 °C) 98 1 °F (36 7 °C) 97 6 °F (36 4 °C) 97 5 °F (36 4 °C)   TempSrc: Temporal Temporal Temporal    SpO2:  97% 94%    Weight:       Height:         Appearance:  age appropriate, casually dressed and overweight older than stated age, uncombed long grey hair, disheveled unkempt with a body odor wearing the same clothing for days    Behavior:  evasive and guarded and suspicious and preoccupied with psychosomatic complaints and argumentative refusing to take showers and appearing fearful and anxious and accusatory with multiple excuses for nottakingshowers   Speech:  normal pitch and normal volume but circumstantial and tangential with stilted speech off and on   Mood:  anxious and dysthymic   Affect:  mood-congruent, elated and entitled anxious and irritated and sad at times    Thought Process:  goal directed and illogical slightly pressured and tangential and talks as if in a court of law   Thought Content:  Delusional  believing she cannot breathe or cannot swallow or having low blood sugar or having cancer and going to die if she takes a shower etc   No current suicidal homicidal thoughts intent or plans reported  No phobias obsessions or compulsions reported  Also accuses staff of tampering with her medications and her spirometer and oxygen concentrator and  Now believes staff is laughing about her    Perceptual Disturbances: None and does not appear responding to internal stimuli at times   Risk Potential: Tendency to not care for herself    Sensorium:  person, place, time/date, situation, day of week, month of year and time   Cognition:  grossly intact   Consciousness:  alert and awake    Attention: Intact concentration and attention span   Intellect: Considered to be at least of average intelligence   Insight:  limited and in denial of her illness   Judgment: poor      Motor Activity: no abnormal movements         Recent Labs:  Results Reviewed     None          I/O Past 24 hours:  No intake/output data recorded  No intake/output data recorded          Assessment / Plan:     Schizoaffective disorder, bipolar [facility] ([facility] )      Reason for continued inpatient care:  Because of underlying paranoia and refusal to go back to the personal care home and inability to care for herself on her own  Acceptance by patient:  Accepting  Clara Saenz in recovery:  About living in another personal care home once she feels better  Understanding of medications :  Has some understanding  Involved in reintegration process:  Adjusting to the unit  Trusting in relatoinship with psychiatrist:  Trusting    Recommended Treatment:    Medication changes:  1) none today  Non-pharmacological treatments  1) continue with  individual therapy group therapy, milieu therapy and occupational therapy and milieu therapy involving multidisciplinary team approach with psychotherapist, case management, nursing, peer support services, art therapist etc using recovery principles and psycho-education about accepting illness and need for treatment   2) encourage to cocoperate with behavior plan  3) encourage to attend to ADLs like taking showers and wearing clean clothes   4) Encourage to attend groups   Safety  1) Safety/communication plan established targeting dynamic risk factors above  Discharge Plan most likely back to the a:  Personal care boarding home with act services once her delusions are managed and mood becomes more stabilized and she becomes more receptive to treatment compliance    Counseling / Coordination of Care    Total floor / unit time spent today 15 minutes  Greater than 50% of total time was spent with the patient and / or family counseling and / or coordination of care  A description of the counseling / coordination of care  Patient's Rights, confidentiality and exceptions to confidentiality, use of automated medical record, [facility] staff access to medical record, and consent to treatment reviewed      Mynor Terry MD

## 2019-11-26 NOTE — PROGRESS NOTES
11/26/19 0943   Team Meeting   Meeting Type Tx Team Meeting   Initial Conference Date 11/26/19   Next Conference Date 12/03/19   Team Members Present   Team Members Present Physician;Nurse;; Other (Discipline and Name)   Physician Team Member Dr Kusum Sanabria Team Member Javon Cuellar RN   Care Management Team Member Brenda Pleitez   Other (Discipline and Name) Rosanne Covert, [facility]; [facility] REYES   Patient/Family Present   Patient Present Yes   Patient's Family Present No     Treatment team met with patient outside her room for team meeting this morning  No self assessment was completed  Patient attended 29% of groups offered last week  Team met with patient outside her room due to her lack of caring for her hygiene  Team provided multiple different interventions for patient, however, patient was not receptive to the interventions suggested by team regarding her hygiene and how to improve it for herself as well as her peers around her  Team and patient completed risk assessment and the patient did not verbalize any desire to elope from the program  Patient verbalized understanding of consequences of eloping from treatment while on a commitment  Patient verbalized no further questions or concerns at the conclusion of the meeting

## 2019-11-27 NOTE — PROGRESS NOTES
11/27/19 0900   Team Meeting   Meeting Type Daily Rounds   Team Members Present   Team Members Present Physician;Nurse;; Other (Discipline and Name)   Physician Team Member Dr Marino Vail, RN   Care Management Team Member Brenda Vizcarra   Other (Discipline and Name) Moisés Gerene, JONATHON; Dominick Kuhn, SHANIKA     Still hasn't showered  No groups  Diet changed back to regular  Trending what she eats  Slept

## 2019-11-27 NOTE — PLAN OF CARE
Problem: Alteration in Thoughts and Perception  Goal: Attend and participate in unit activities, including therapeutic, recreational, and educational groups  Description  Interventions:  - Provide therapeutic and educational activities daily, encourage attendance and participation, and document same in the medical record     CERTIFIED PEER SPECIALIST INTERVENTIONS:    Complete peer assessment with patient to assess their needs and identify their goals to complete while in the recovery program as well as once discharged into the community  Patient will complete WRAP Plan, Crisis Plan and 5 Life Domains  Patient will attend 50% of groups offered on the unit  Patient will complete a goal card weekly  Outcome: Not Progressing  Goal: Complete daily ADLs, including personal hygiene independently, as able  Description  Interventions:  - Observe, teach, and assist patient with ADLS  - Monitor and promote a balance of rest/activity, with adequate nutrition and elimination   Outcome: Not Progressing     Problem: Depression  Goal: Refrain from self-neglect  Outcome: Not Merrick Sanchez has been in her room most of the shift  Naps at times  Interacts with staff and select peers  Able to make needs known  Good eye contact when conversing  Took medication with prompting  No issue swallowing pills with water  No coughing noted  Still focused on her swallowing when she eats  "Don't they realize that even if it is somatic it is a problem " She has juice, milk and coffee  "I chewed the mac and cheese, but I am afraid to swallow it " Reassurance given  No shower this evening, but did have a BM  Disheveled appearance  Body odor noted  Did not attend evening group due to napping at that time  Ate snack (2 yogurts) without difficulty  Took HS medication  Oxygen level was 91% prior to 1 liter  humidified oxygen applied via nasal cannula  Continue to monitor  Precautions maintained

## 2019-11-27 NOTE — PROGRESS NOTES
Patient declined      11/26/19 1500   Activity/Group Checklist   Group   (Recovery Workshop )   Attendance Did not attend   Attendance Duration (min) 46-60   Affect/Mood IRMA

## 2019-11-27 NOTE — PROGRESS NOTES
Not scheduled to attend      11/27/19 1400   Activity/Group Checklist   Group   (Recovery Anonymous )   Attendance Did not attend   Attendance Duration (min) 46-60   Affect/Mood IRMA

## 2019-11-27 NOTE — ASSESSMENT & PLAN NOTE
Psychiatry Progress Note  Patient has has not taken a shower since 11/16/19  He still complains about having low blood sugar and the fear that she is going to die here if he takes a shower  She is still grossly disheveled unkempt with a strong body order so that peers are complaining about her in the dining area  She continues to verbalize paranoia about certain staff tampering with her medications and with her spirometer and oxygen concentrator and somatic delusions about not able to swallow or breathe properly and  has hypoglycemia and has terrible illness like cancer etc   Still refusing to attend most of groups   She prefers to lay back in bed but gets up for meals and for medications only  No current suicidal homicidal thoughts intent or plans reported  Because of her strong body odor, some of the peers are avoiding to sit next to her on near her  No signs or sxs of agranulocytosis or myocarditis or endocarditis and she is moving her bowels so far with no issues     She was again reminded to work with the program and work towards going back to the [facility] and start attending groups which she has more than 25% last week but she needs to attend to her ADLs skills as well before we can look into discharging her back to the personal care home but she remains passive-aggressive and refuses to do anything to help herself like attending to her ADLs even with reminders and has not been complying with the behavior plan either despite telling us she will take showers daily and does not   Has been compliant with medications including the recent increase in Zoloft so for and is again accusatory to staff and paranoid and demanding    Current medications:    Current Facility-Administered Medications:     acetaminophen (TYLENOL) tablet 650 mg, 650 mg, Oral, Q6H PRN, Krystyna Mcclain MD    albuterol (PROVENTIL HFA,VENTOLIN HFA) inhaler 2 puff, 2 puff, Inhalation, Q4H PRN, Krystyna Mcclain MD, 2 puff at 10/11/19 0424    aluminum-magnesium hydroxide-simethicone (MYLANTA) 200-200-20 mg/5 mL oral suspension 15 mL, 15 mL, Oral, Q4H PRN, Shilpa Trujillo MD    ammonium lactate (LAC-HYDRIN) 12 % lotion 1 application, 1 application, Topical, BID PRN, Shilpa Trujillo MD    benzonatate (TESSALON PERLES) capsule 100 mg, 100 mg, Oral, TID PRN, Shilpa Trujillo MD    benztropine (COGENTIN) injection 1 mg, 1 mg, Intramuscular, Q8H PRN, Shilpa Trujillo MD    cloZAPine (CLOZARIL) tablet 25 mg, 25 mg, Oral, BID, Shilpa Trujillo MD, 25 mg at 11/26/19 2129    cloZAPine (CLOZARIL) tablet 50 mg, 50 mg, Oral, BID, Shilpa Trujillo MD, 50 mg at 11/26/19 2130    EPINEPHrine PF (ADRENALIN) 1 mg/mL injection 0 15 mg, 0 15 mg, Intramuscular, Once PRN, Shilpa Trujillo MD    fluticasone-vilanterol (BREO ELLIPTA) 200-25 MCG/INH inhaler 1 puff, 1 puff, Inhalation, Daily, Shilpa Trujillo MD, 1 puff at 11/27/19 0911    ketotifen (ZADITOR) 0 025 % ophthalmic solution 1 drop, 1 drop, Right Eye, BID PRN, Shilpa Trujillo MD    levothyroxine tablet 125 mcg, 125 mcg, Oral, Early Morning, Shilpa Trujillo MD, 125 mcg at 11/27/19 3184    magnesium hydroxide (MILK OF MAGNESIA) 400 mg/5 mL oral suspension 30 mL, 30 mL, Oral, Daily PRN, Shilpa Trujillo MD    montelukast (SINGULAIR) tablet 10 mg, 10 mg, Oral, HS, Shilpa Trujillo MD, 10 mg at 11/12/19 2141    OLANZapine (ZyPREXA) IM injection 5 mg, 5 mg, Intramuscular, Q8H PRN, Shilpa Trujillo MD    OLANZapine (ZyPREXA) tablet 5 mg, 5 mg, Oral, Q8H PRN, Shilpa Trujillo MD    ondansetron (ZOFRAN-ODT) dispersible tablet 4 mg, 4 mg, Oral, Q6H PRN, Shilpa Trujillo MD, 4 mg at 11/09/19 1223    pantoprazole (PROTONIX) EC tablet 40 mg, 40 mg, Oral, Early Morning, Shilpa Trujillo MD, 40 mg at 11/27/19 3996    polyethylene glycol (MIRALAX) packet 17 g, 17 g, Oral, Daily PRN, Shilpa Trujillo MD    polyvinyl alcohol (LIQUIFILM TEARS) 1 4 % ophthalmic solution 1 drop, 1 drop, Both Eyes, Q3H PRN, Shilpa Trujillo MD    sertraline (ZOLOFT) tablet 50 mg, 50 mg, Oral, BID, Zac Sierra MD, 50 mg at 11/27/19 0912    sucralfate (CARAFATE) oral suspension 1,000 mg, 1,000 mg, Oral, BID, Yani Toribio MD, 1,000 mg at 11/27/19 6972    theophylline (JEF-24) 24 hr capsule 200 mg, 200 mg, Oral, Daily, Zac Sierra MD, 200 mg at 11/27/19 0912    tiotropium (SPIRIVA) capsule for inhaler 18 mcg, 18 mcg, Inhalation, Daily, Zac Sierra MD, 18 mcg at 11/27/19 0911    traZODone (DESYREL) tablet 25 mg, 25 mg, Oral, HS PRN, Zac Sierra MD  Justification if on more than two antipsychotics:  Only on his clozapine  Side effects if any:  None    Risks , benefits, side effects and precautions of medications discussed with patient and reminded patient to let us know any problems with medications     Objective:     Vital Signs:  Vitals:    11/26/19 1600 11/26/19 1940 11/27/19 0730 11/27/19 0732   BP: 98/76 98/82 117/67 98/62   BP Location: Left arm Left arm Left arm Left arm   Pulse: 86 94 86 74   Resp: 17 18 18    Temp: 97 9 °F (36 6 °C) 97 8 °F (36 6 °C) 97 6 °F (36 4 °C)    TempSrc: Temporal Temporal Temporal    SpO2: 94% 93%     Weight:       Height:         Appearance:  age appropriate, casually dressed and overweight older than stated age, uncombed long grey hair, disheveled unkempt with a body odor wearing the same clothing for days preferring to lay back on in bed and refusing to get out   Behavior:  evasive and guarded and suspicious and preoccupied with psychosomatic complaints and argumentative refusing to take showers and appearing fearful and anxious and accusatory with multiple excuses for not taking any showers and demanding to check her blood sugar and asking for extra juice claiming she has hyperglycemia   Speech:  normal pitch and normal volume but circumstantial and tangential with stilted speech off and on   Mood:  anxious and dysthymic   Affect:  mood-congruent, elated and entitled anxious and irritated and sad at times    Thought Process:  goal directed and illogical slightly pressured and tangential and talks as if in a court of law   Thought Content:  Delusional  believing she cannot breathe or cannot swallow or having low blood sugar demanding extra juice or having cancer and going to die if she takes a shower     No current suicidal homicidal thoughts intent or plans reported  No phobias obsessions or compulsions reported  Also accuses staff of tampering with her medications and her spirometer and oxygen concentrator  Now accusing staff of laughing at her as well    Perceptual Disturbances: None and does not appear responding to internal stimuli at times   Risk Potential: Tendency to not care for herself    Sensorium:  person, place, time/date, situation, day of week, month of year and time   Cognition:  grossly intact   Consciousness:  alert and awake    Attention: Intact concentration and attention span   Intellect: Considered to be at least of average intelligence   Insight:  limited and in denial of her illness   Judgment: poor      Motor Activity: no abnormal movements         Recent Labs:  Results Reviewed     None          I/O Past 24 hours:  No intake/output data recorded  No intake/output data recorded          Assessment / Plan:     Schizoaffective disorder, bipolar type ([facility] )      Reason for continued inpatient care:  Because of underlying paranoia and refusal to go back to the personal care home and inability to care for herself on her own  Acceptance by patient:  Accepting  Kitty Sicard in recovery:  About living in another personal care home once she feels better  Understanding of medications :  Has some understanding  Involved in reintegration process:  Adjusting to the unit  Trusting in relatoinship with psychiatrist:  Trusting    Recommended Treatment:    Medication changes:  1) none today  Non-pharmacological treatments  1) continue with  individual therapy group therapy, milieu therapy and occupational therapy and milieu therapy involving multidisciplinary team approach with psychotherapist, case management, nursing, peer support services, art therapist etc using recovery principles and psycho-education about accepting illness and need for treatment   2) encourage to cocoperate with behavior plan  3) encourage to attend to ADLs like taking showers and wearing clean clothes   4) Encourage to attend groups   Safety  1) Safety/communication plan established targeting dynamic risk factors above  Discharge Plan most likely back to the a:  Personal care boarding home with act services once her delusions are managed and mood becomes more stabilized and she becomes more receptive to treatment compliance    Counseling / Coordination of Care    Total floor / unit time spent today 15 minutes  Greater than 50% of total time was spent with the patient and / or family counseling and / or coordination of care  A description of the counseling / coordination of care  Patient's Rights, confidentiality and exceptions to confidentiality, use of automated medical record, [facility]eline staff access to medical record, and consent to treatment reviewed      Roberto Colorado MD

## 2019-11-27 NOTE — PLAN OF CARE
Problem: Alteration in Thoughts and Perception  Goal: Verbalize thoughts and feelings  Description  Interventions:  - Promote a nonjudgmental and trusting relationship with the patient through active listening and therapeutic communication  - Assess patient's level of functioning, behavior and potential for risk  - Engage patient in 1 on 1 interactions for a minimum of 15 minutes each session  - Encourage patient to express fears, feelings, frustrations, and discuss symptoms    - [place] patient to reality, help patient recognize reality-based thinking   - Administer medications as ordered and assess for potential side effects  - Provide the patient education related to the signs and symptoms of the illness and desired effects of prescribed medications  Outcome: Progressing  Goal: Agree to be compliant with medication regime, as prescribed and report medication side effects  Description  Interventions:  - Offer appropriate PRN medication and supervise ingestion; conduct aims, as needed   Outcome: Progressing     Problem: Ineffective Coping  Goal: Patient/Family verbalizes awareness of resources  Outcome: Progressing  Goal: Understands least restrictive measures  Description  Interventions:  - Utilize least restrictive behavior  Outcome: Progressing     Problem: Risk for Self Injury/Neglect  Goal: Treatment Goal: Remain safe during length of stay, learn and adopt new coping skills, and be free of self-injurious ideation, impulses and acts at the time of discharge  Outcome: Progressing  Goal: Verbalize thoughts and feelings  Description  Interventions:  - Assess and re-assess patient's lethality and potential for self-injury  - Engage patient in 1:1 interactions, daily, for a minimum of 15 minutes  - Encourage patient to express feelings, fears, frustrations, hopes  - Establish rapport/trust with patient   Outcome: Progressing  Goal: Refrain from harming self  Description  Interventions:  - Monitor patient closely, per order  - Develop a trusting relationship  - Supervise medication ingestion, monitor effects and side effects   Outcome: Progressing     Problem: Depression  Goal: Verbalize thoughts and feelings  Description  Interventions:  - Assess and re-assess patient's level of risk   - Engage patient in 1:1 interactions, daily, for a minimum of 15 minutes   - Encourage patient to express feelings, fears, frustrations, hopes   Outcome: Progressing     Problem: Alteration in Orientation  Goal: Interact with staff daily  Description  Interventions:  - Assess and re-assess patient's level of orientation  - Engage patient in 1 on 1 interactions, daily, for a minimum of 15 minutes   - Establish rapport/trust with patient   Outcome: Progressing     Problem: PAIN - ADULT  Goal: Verbalizes/displays adequate comfort level or baseline comfort level  Description  Interventions:  - Encourage patient to monitor pain and request assistance  - Assess pain using appropriate pain scale  - Administer analgesics based on type and severity of pain and evaluate response  - Implement non-pharmacological measures as appropriate and evaluate response  - Consider cultural and social influences on pain and pain management  - Notify physician/advanced practitioner if interventions unsuccessful or patient reports new pain  Outcome: Progressing     Problem: SAFETY ADULT  Goal: Patient will remain free of falls  Description  INTERVENTIONS:  - Assess patient frequently for physical needs  -  Identify cognitive and physical deficits and behaviors that affect risk of falls    -  [place] fall precautions as indicated by assessment   - Educate patient/family on patient safety including physical limitations  - Instruct patient to call for assistance with activity based on assessment  - Modify environment to reduce risk of injury  - Consider OT/PT consult to assist with strengthening/mobility  Outcome: Progressing     Problem: Alteration in Thoughts and Perception  Goal: Treatment Goal: Gain control of psychotic behaviors/thinking, reduce/eliminate presenting symptoms and demonstrate improved reality functioning upon discharge  Outcome: Not Progressing  Goal: Attend and participate in unit activities, including therapeutic, recreational, and educational groups  Description  Interventions:  - Provide therapeutic and educational activities daily, encourage attendance and participation, and document same in the medical record     CERTIFIED PEER SPECIALIST INTERVENTIONS:    Complete peer assessment with patient to assess their needs and identify their goals to complete while in the recovery program as well as once discharged into the community  Patient will complete WRAP Plan, Crisis Plan and 5 Life Domains  Patient will attend 50% of groups offered on the unit  Patient will complete a goal card weekly      Outcome: Not Progressing  Goal: Recognize dysfunctional thoughts, communicate reality-based thoughts at the time of discharge  Description  Interventions:  - Provide medication and psycho-education to assist patient in compliance and developing insight into his/her illness   Outcome: Not Progressing  Goal: Complete daily ADLs, including personal hygiene independently, as able  Description  Interventions:  - Observe, teach, and assist patient with ADLS  - Monitor and promote a balance of rest/activity, with adequate nutrition and elimination   Outcome: Not Progressing     Problem: Ineffective Coping  Goal: Identifies ineffective coping skills  Outcome: Not Progressing  Goal: Identifies healthy coping skills  Outcome: Not Progressing  Goal: Demonstrates healthy coping skills  Outcome: Not Progressing  Goal: Participates in unit activities  Description  Interventions:  - Provide therapeutic environment   - Provide required programming   - Redirect inappropriate behaviors   Outcome: Not Progressing     Problem: Risk for Self Injury/Neglect  Goal: Attend and participate in unit activities, including therapeutic, recreational, and educational groups  Description  Interventions:  - Provide therapeutic and educational activities daily, encourage attendance and participation, and document same in the medical record  - Obtain collateral information, encourage visitation and family involvement in care   Outcome: Not Progressing  Goal: Recognize maladaptive responses and adopt new coping mechanisms  Outcome: Not Progressing  Goal: Complete daily ADLs, including personal hygiene independently, as able  Description  Interventions:  - Observe, teach, and assist patient with ADLS  - Monitor and promote a balance of rest/activity, with adequate nutrition and elimination  Outcome: Not Progressing     Problem: Depression  Goal: Treatment Goal: Demonstrate behavioral control of depressive symptoms, verbalize feelings of improved mood/affect, and adopt new coping skills prior to discharge  Outcome: Not Progressing  Goal: Refrain from isolation  Description  Interventions:  - Develop a trusting relationship   - Encourage socialization   Outcome: Not Progressing  Goal: Refrain from self-neglect  Outcome: Not Progressing     Problem: Nutrition/Hydration-ADULT  Goal: Nutrient/Hydration intake appropriate for improving, restoring or maintaining nutritional needs  Description  Monitor and assess patient's nutrition/hydration status for malnutrition  Collaborate with interdisciplinary team and initiate plan and interventions as ordered  Monitor patient's weight and dietary intake as ordered or per policy  Utilize nutrition screening tool and intervene as necessary  Determine patient's food preferences and provide high-protein, high-caloric foods as appropriate       INTERVENTIONS:  - Monitor oral intake, urinary output, labs, and treatment plans  - Assess nutrition and hydration status and recommend course of action  - Evaluate amount of meals eaten  - Assist patient with eating if necessary - Allow adequate time for meals  - Recommend/ encourage appropriate diets, oral nutritional supplements, and vitamin/mineral supplements  - Order, calculate, and assess calorie counts as needed  - Recommend, monitor, and adjust tube feedings and TPN/PPN based on assessed needs  - Assess need for intravenous fluids  - Provide specific nutrition/hydration education as appropriate  - Include patient/family/caregiver in decisions related to nutrition  Outcome: Not Progressing   Mostly isolative to her room, coming out for meds and meals only  Less somatic complaints voiced however still voices fear of choking and as a result ate poorly  Drank fluids with both meals but did not eat any solid foods  Watered down her oatmeal with breakfast  Ate very slowly  Complained that staff were not watching her closely enough, implying that she needs 1:1 supervision when eating and indicated that being reason for not eating  Still refuses to shower, dismissive or flat out ignores staff when encouraged to shower or participate in programming / treatment  No other issues noted  Continue to monitor

## 2019-11-27 NOTE — PROGRESS NOTES
Progress Note - Arvil Model 1955, 60 y o  female MRN: [id number]    [facility] Encounter: [id number]    Primary Care Provider: Galen Torres PA-C   Date and time admitted to hospital: 7/23/2019  5:30 PM        * Schizoaffective disorder, bipolar type [facility])  Assessment & Plan  Psychiatry Progress Note  Patient has has not taken a shower since 11/16/19  He still complains about having low blood sugar and the fear that she is going to die here if he takes a shower  She is still grossly disheveled unkempt with a strong body order so that peers are complaining about her in the dining area  She continues to verbalize paranoia about certain staff tampering with her medications and with her spirometer and oxygen concentrator and somatic delusions about not able to swallow or breathe properly and  has hypoglycemia and has terrible illness like cancer etc   Still refusing to attend most of groups   She prefers to lay back in bed but gets up for meals and for medications only  No current suicidal homicidal thoughts intent or plans reported  Because of her strong body odor, some of the peers are avoiding to sit next to her on near her  No signs or sxs of agranulocytosis or myocarditis or endocarditis and she is moving her bowels so far with no issues     She was again reminded to work with the program and work towards going back to the /[facility] care [place] and start attending groups which she has more than 25% last week but she needs to attend to her ADLs skills as well before we can look into discharging her back to the personal care home but she remains passive-aggressive and refuses to do anything to help herself like attending to her ADLs even with reminders and has not been complying with the behavior plan either despite telling us she will take showers daily and does not   Has been compliant with medications including the recent increase in Zoloft so for and is again accusatory to staff and paranoid and demanding    Current medications:    Current Facility-Administered Medications:     acetaminophen (TYLENOL) tablet 650 mg, 650 mg, Oral, Q6H PRN, Deep Durand MD    albuterol (PROVENTIL HFA,VENTOLIN HFA) inhaler 2 puff, 2 puff, Inhalation, Q4H PRN, Deep Durand MD, 2 puff at 10/11/19 0424    aluminum-magnesium hydroxide-simethicone (MYLANTA) 200-200-20 mg/5 mL oral suspension 15 mL, 15 mL, Oral, Q4H PRN, Deep Durand MD    ammonium lactate (LAC-HYDRIN) 12 % lotion 1 application, 1 application, Topical, BID PRN, Deep Durand MD    benzonatate (TESSALON PERLES) capsule 100 mg, 100 mg, Oral, TID PRN, Deep Durand MD    benztropine (COGENTIN) injection 1 mg, 1 mg, Intramuscular, Q8H PRN, Deep Durand MD    cloZAPine (CLOZARIL) tablet 25 mg, 25 mg, Oral, BID, Deep Durand MD, 25 mg at 11/26/19 2129    cloZAPine (CLOZARIL) tablet 50 mg, 50 mg, Oral, BID, Deep Durand MD, 50 mg at 11/26/19 2130    EPINEPHrine PF (ADRENALIN) 1 mg/mL injection 0 15 mg, 0 15 mg, Intramuscular, Once PRN, Deep Durand MD    fluticasone-vilanterol (BREO ELLIPTA) 200-25 MCG/INH inhaler 1 puff, 1 puff, Inhalation, Daily, Deep Durand MD, 1 puff at 11/27/19 0911    ketotifen (ZADITOR) 0 025 % ophthalmic solution 1 drop, 1 drop, Right Eye, BID PRN, Deep Durand MD    levothyroxine tablet 125 mcg, 125 mcg, Oral, Early Morning, Deep Durand MD, 125 mcg at 11/27/19 1921    magnesium hydroxide (MILK OF MAGNESIA) 400 mg/5 mL oral suspension 30 mL, 30 mL, Oral, Daily PRN, Deep Durand MD    montelukast (SINGULAIR) tablet 10 mg, 10 mg, Oral, HS, Deep Durand MD, 10 mg at 11/12/19 2141    OLANZapine (ZyPREXA) IM injection 5 mg, 5 mg, Intramuscular, Q8H PRN, Deep Durand MD    OLANZapine (ZyPREXA) tablet 5 mg, 5 mg, Oral, Q8H PRN, Deep Durand MD    ondansetron (ZOFRAN-ODT) dispersible tablet 4 mg, 4 mg, Oral, Q6H PRN, Deep Durand MD, 4 mg at 11/09/19 1223    pantoprazole (PROTONIX) EC tablet 40 mg, 40 mg, Oral, Early Morning, Christian Bennett MD, 40 mg at 11/27/19 4781    polyethylene glycol (MIRALAX) packet 17 g, 17 g, Oral, Daily PRN, Christian Bennett MD    polyvinyl alcohol (LIQUIFILM TEARS) 1 4 % ophthalmic solution 1 drop, 1 drop, Both Eyes, Q3H PRN, Christian Bennett MD    sertraline (ZOLOFT) tablet 50 mg, 50 mg, Oral, BID, Christian Bennett MD, 50 mg at 11/27/19 0912    sucralfate (CARAFATE) oral suspension 1,000 mg, 1,000 mg, Oral, BID, Sera Roth MD, 1,000 mg at 11/27/19 6764    theophylline (JEF-24) 24 hr capsule 200 mg, 200 mg, Oral, Daily, Christian Bennett MD, 200 mg at 11/27/19 0912    tiotropium (SPIRIVA) capsule for inhaler 18 mcg, 18 mcg, Inhalation, Daily, Christian Bennett MD, 18 mcg at 11/27/19 0911    traZODone (DESYREL) tablet 25 mg, 25 mg, Oral, HS PRN, Christian Bennett MD  Justification if on more than two antipsychotics:  Only on his clozapine  Side effects if any:  None    Risks , benefits, side effects and precautions of medications discussed with patient and reminded patient to let us know any problems with medications     Objective:     Vital Signs:  Vitals:    11/26/19 1600 11/26/19 1940 11/27/19 0730 11/27/19 0732   BP: 98/76 98/82 117/67 98/62   BP Location: Left arm Left arm Left arm Left arm   Pulse: 86 94 86 74   Resp: 17 18 18    Temp: 97 9 °F (36 6 °C) 97 8 °F (36 6 °C) 97 6 °F (36 4 °C)    TempSrc: Temporal Temporal Temporal    SpO2: 94% 93%     Weight:       Height:         Appearance:  age appropriate, casually dressed and overweight older than stated age, uncombed long grey hair, disheveled unkempt with a body odor wearing the same clothing for days preferring to lay back on in bed and refusing to get out   Behavior:  evasive and guarded and suspicious and preoccupied with psychosomatic complaints and argumentative refusing to take showers and appearing fearful and anxious and accusatory with multiple excuses for not taking any showers and demanding to check her blood sugar and asking for extra juice claiming she has hyperglycemia   Speech:  normal pitch and normal volume but circumstantial and tangential with stilted speech off and on   Mood:  anxious and dysthymic   Affect:  mood-congruent, elated and entitled anxious and irritated and sad at times    Thought Process:  goal directed and illogical slightly pressured and tangential and talks as if in a court of law   Thought Content:  Delusional  believing she cannot breathe or cannot swallow or having low blood sugar demanding extra juice or having cancer and going to die if she takes a shower     No current suicidal homicidal thoughts intent or plans reported  No phobias obsessions or compulsions reported  Also accuses staff of tampering with her medications and her spirometer and oxygen concentrator  Now accusing staff of laughing at her as well    Perceptual Disturbances: None and does not appear responding to internal stimuli at times   Risk Potential: Tendency to not care for herself    Sensorium:  person, place, time/date, situation, day of week, month of year and time   Cognition:  grossly intact   Consciousness:  alert and awake    Attention: Intact concentration and attention span   Intellect: Considered to be at least of average intelligence   Insight:  limited and in denial of her illness   Judgment: poor      Motor Activity: no abnormal movements         Recent Labs:  Results Reviewed     None          I/O Past 24 hours:  No intake/output data recorded  No intake/output data recorded          Assessment / Plan:     [facility]      Reason for continued inpatient care:  Because of underlying paranoia and refusal to go back to the personal care home and inability to care for herself on her own  Acceptance by patient:  Accepting  Pauletta Dose in recovery:  About living in another personal care home once she feels better  Understanding of medications :  Has some understanding  Involved in reintegration process: Adjusting to the unit  Trusting in relatoinship with psychiatrist:  Trusting    Recommended Treatment:    Medication changes:  1) none today  Non-pharmacological treatments  1) continue with  individual therapy group therapy, milieu therapy and occupational therapy and milieu therapy involving multidisciplinary team approach with psychotherapist, case management, nursing, peer support services, art therapist etc using recovery principles and psycho-education about accepting illness and need for treatment   2) encourage to cocoperate with behavior plan  3) encourage to attend to ADLs like taking showers and wearing clean clothes   4) Encourage to attend groups   Safety  1) Safety/communication plan established targeting dynamic risk factors above  Discharge Plan most likely back to the a:  Personal care boarding home with [facility] services once her delusions are managed and mood becomes more stabilized and she becomes more receptive to treatment compliance    Counseling / Coordination of Care    Total floor / unit time spent today 15 minutes  Greater than 50% of total time was spent with the patient and / or family counseling and / or coordination of care  A description of the counseling / coordination of care  Patient's Rights, confidentiality and exceptions to confidentiality, use of automated medical record, [address] [place] [address] staff access to medical record, and consent to treatment reviewed      Roberto Shankar MD

## 2019-11-28 NOTE — PLAN OF CARE
Problem: PAIN - ADULT  Goal: Verbalizes/displays adequate comfort level or baseline comfort level  Description  Interventions:  - Encourage patient to monitor pain and request assistance  - Assess pain using appropriate pain scale  - Administer analgesics based on type and severity of pain and evaluate response  - Implement non-pharmacological measures as appropriate and evaluate response  - Consider cultural and social influences on pain and pain management  - Notify physician/advanced practitioner if interventions unsuccessful or patient reports new pain  Outcome: Progressing     Problem: SAFETY ADULT  Goal: Patient will remain free of falls  Description  INTERVENTIONS:  - Assess patient frequently for physical needs  -  Identify cognitive and physical deficits and behaviors that affect risk of falls    -  [place] fall precautions as indicated by assessment   - Educate patient/family on patient safety including physical limitations  - Instruct patient to call for assistance with activity based on assessment  - Modify environment to reduce risk of injury  - Consider OT/PT consult to assist with strengthening/mobility  Outcome: Progressing     Problem: RESPIRATORY - ADULT  Goal: Achieves optimal ventilation and oxygenation  Description  INTERVENTIONS:  - Assess for changes in respiratory status  - Assess for changes in mentation and behavior  - Position to facilitate oxygenation and minimize respiratory effort  - Oxygen administration by appropriate delivery method based on oxygen saturation (per order) or ABGs  - Initiate smoking cessation education as indicated  - Encourage broncho-pulmonary hygiene including cough, deep breathe, Incentive Spirometry  - Assess the need for suctioning and aspirate as needed  - Assess and instruct to report SOB or any respiratory difficulty  - Respiratory Therapy support as indicated  Outcome: Progressing     Problem: SLEEP DISTURBANCE  Goal: Will exhibit normal sleeping pattern  Description  Interventions:  -  Assess the patients sleep pattern, noting recent changes  - Administer medication as ordered  - Decrease environmental stimuli, including noise, as appropriate during the night  - Encourage the patient to actively participate in unit groups and or exercise during the day to enhance ability to achieve adequate sleep at night  - Assess the patient, in the morning, encouraging a description of sleep experience  Outcome: Progressing    Laying in bed with eyes closed, breath even and unlabored  On O2 with humidifier @1L/m via nasal cannula  Q 15 minutes rounding  Maintained on fall and SAFE precaution  No somatic complaint overnight  No respiratory distress  Will continue to monitor

## 2019-11-28 NOTE — PROGRESS NOTES
Psychiatry Progress Note    Subjective: Interval History     Patient seen and examined this morning  During the examination patient was seen resting comfortably in bed  Patient had no complaints to offer me at this time  She reports her depression is a 5/10 and her anxiety is a 2/4  She continues to focus and fixate on the fact that she has trouble swallowing  Despite this the patient was able to eat over meal this morning  Her appetite remains poor  She has been compliant with medications  Patient has no issues or complaints to offer me at this time      Behavior over the last 24 hours:  unchanged  Sleep: normal  Appetite: normal  Medication side effects: No  ROS: no complaints    Current medications:    Current Facility-Administered Medications:     acetaminophen (TYLENOL) tablet 650 mg, 650 mg, Oral, Q6H PRN, Meldon Curling, MD    albuterol (PROVENTIL HFA,VENTOLIN HFA) inhaler 2 puff, 2 puff, Inhalation, Q4H PRN, Meldon Curling, MD, 2 puff at 10/11/19 0424    aluminum-magnesium hydroxide-simethicone (MYLANTA) 200-200-20 mg/5 mL oral suspension 15 mL, 15 mL, Oral, Q4H PRN, Meldon Curling, MD    ammonium lactate (LAC-HYDRIN) 12 % lotion 1 application, 1 application, Topical, BID PRN, Meldon Curling, MD    benzonatate (TESSALON PERLES) capsule 100 mg, 100 mg, Oral, TID PRN, Meldon Curling, MD    benztropine (COGENTIN) injection 1 mg, 1 mg, Intramuscular, Q8H PRN, Meldon Curling, MD    cloZAPine (CLOZARIL) tablet 25 mg, 25 mg, Oral, BID, Meldon Curling, MD, 25 mg at 11/27/19 2153    cloZAPine (CLOZARIL) tablet 50 mg, 50 mg, Oral, BID, Meldon Curling, MD, 50 mg at 11/27/19 2153    EPINEPHrine PF (ADRENALIN) 1 mg/mL injection 0 15 mg, 0 15 mg, Intramuscular, Once PRN, Meldon Curling, MD    fluticasone-vilanterol (BREO ELLIPTA) 200-25 MCG/INH inhaler 1 puff, 1 puff, Inhalation, Daily, Meldon Curling, MD, 1 puff at 11/28/19 0912    ketotifen (ZADITOR) 0 025 % ophthalmic solution 1 drop, 1 drop, Right Eye, BID PRN, Meldon Curling, MD    levothyroxine tablet 125 mcg, 125 mcg, Oral, Early Morning, Faheem Daniels MD, 125 mcg at 11/28/19 0607    magnesium hydroxide (MILK OF MAGNESIA) 400 mg/5 mL oral suspension 30 mL, 30 mL, Oral, Daily PRN, Faheem Daniels MD    montelukast (SINGULAIR) tablet 10 mg, 10 mg, Oral, HS, Faheem Daniels MD, 10 mg at 11/12/19 2141    OLANZapine (ZyPREXA) IM injection 5 mg, 5 mg, Intramuscular, Q8H PRN, Faheem Daniels MD    OLANZapine (ZyPREXA) tablet 5 mg, 5 mg, Oral, Q8H PRN, Faheem Daniels MD    ondansetron (ZOFRAN-ODT) dispersible tablet 4 mg, 4 mg, Oral, Q6H PRN, Faheem Daniels MD, 4 mg at 11/09/19 1223    pantoprazole (PROTONIX) EC tablet 40 mg, 40 mg, Oral, Early Morning, Faheem Daniels MD, 40 mg at 11/28/19 0607    polyethylene glycol (MIRALAX) packet 17 g, 17 g, Oral, Daily PRN, Faheem Daniels MD    polyvinyl alcohol (LIQUIFILM TEARS) 1 4 % ophthalmic solution 1 drop, 1 drop, Both Eyes, Q3H PRN, Faheem Daniels MD    sertraline (ZOLOFT) tablet 50 mg, 50 mg, Oral, BID, Faheem Daniels MD, 50 mg at 11/28/19 0912    sucralfate (CARAFATE) oral suspension 1,000 mg, 1,000 mg, Oral, BID, Jacob Mario MD, 1,000 mg at 11/28/19 0646    theophylline (JEF-24) 24 hr capsule 200 mg, 200 mg, Oral, Daily, Faheem Daniels MD, 200 mg at 11/28/19 0912    tiotropium (SPIRIVA) capsule for inhaler 18 mcg, 18 mcg, Inhalation, Daily, Faheem Daniels MD, 18 mcg at 11/28/19 0912    traZODone (DESYREL) tablet 25 mg, 25 mg, Oral, HS PRN, Faheem Daniels MD    Current Problem List:    Patient Active Problem List   Diagnosis    [facility]is [facility]    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[facility]    Tobacco use disorder, continuous    Bipolar disorder ([facility] )    Left hip pain    Lactic acidosis    Hypokalemia    Hypomagnesemia    Compression fracture of L4 lumbar vertebra    Thoracic compression fracture (HCC)    Ventral hernia    Parapneumonic effusion    Acute on chronic respiratory failure with hypoxia ([facility]    Chronic respiratory failure ([facility]    Hypophosphatemia  Elevated MCV    Schizoaffective disorder, bipolar type (HCC)    Acquired hypothyroidism    Gastroesophageal reflux disease without esophagitis    Abnormal CT of the chest    Excessive cerumen in left ear canal    Lipoma of right upper extremity    Polydipsia    Localized swelling of both lower legs    Noncompliant with deep vein thrombosis (DVT) prophylaxis    Allergic conjunctivitis of right eye    At risk for aspiration       Problem list reviewed 11/28/19     Objective:     Vital Signs:  Vitals:    11/27/19 0730 11/27/19 0732 11/27/19 1600 11/27/19 1900   BP: 117/67 98/62 100/54 103/68   BP Location: Left arm Left arm Right arm Left arm   Pulse: 86 74 95 97   Resp: 18  16 16   Temp: 97 6 °F (36 4 °C)  (!) 97 3 °F (36 3 °C) 97 7 °F (36 5 °C)   TempSrc: Temporal  Temporal Temporal   SpO2:   93% 91%   Weight:       Height:             Appearance:  age appropriate, casually dressed and disheveled   Behavior:  normal   Speech:  normal volume   Mood:  anxious   Affect:  constricted   Thought Process:  normal   Thought Content:  delusions  persecutory   Perceptual Disturbances: None   Risk Potential: none   Sensorium:  person, place and time   Cognition:  intact   Consciousness:  alert and awake    Attention: attention span and concentration were age appropriate   Intellect: average   Insight:  poor   Judgment: poor      Motor Activity: no abnormal movements       I/O Past 24 hours:  No intake/output data recorded  No intake/output data recorded  Labs:  Reviewed 11/28/19    Progress Toward Goals:  unchanged    Assessment / Plan:     [facility]    Recommended Treatment:      Medication changes:  1) continue current treatment plan    Non-pharmacological treatments  1) Continue with group therapy, milieu therapy and occupational therapy  Safety  1) Safety/communication plan established targeting dynamic risk factors above      2) Risks, benefits, and possible side effects of medications explained to patient and patient verbalizes understanding  Counseling / Coordination of Care    Total floor / unit time spent today 20 minutes  Greater than 50% of total time was spent with the patient and / or family counseling and / or coordination of care  A description of the counseling / coordination of care  Patient's Rights, confidentiality and exceptions to confidentiality, use of automated medical record, [facility] staff access to medical record, and consent to treatment reviewed      Izabela Hernandez PA-C

## 2019-11-28 NOTE — PLAN OF CARE
Problem: Alteration in Thoughts and Perception  Goal: Verbalize thoughts and feelings  Description  Interventions:  - Promote a nonjudgmental and trusting relationship with the patient through active listening and therapeutic communication  - Assess patient's level of functioning, behavior and potential for risk  - Engage patient in 1 on 1 interactions for a minimum of 15 minutes each session  - Encourage patient to express fears, feelings, frustrations, and discuss symptoms    - [place] patient to reality, help patient recognize reality-based thinking   - Administer medications as ordered and assess for potential side effects  - Provide the patient education related to the signs and symptoms of the illness and desired effects of prescribed medications  Outcome: Progressing  Goal: Agree to be compliant with medication regime, as prescribed and report medication side effects  Description  Interventions:  - Offer appropriate PRN medication and supervise ingestion; conduct aims, as needed   Outcome: Progressing  Goal: Attend and participate in unit activities, including therapeutic, recreational, and educational groups  Description  Interventions:  - Provide therapeutic and educational activities daily, encourage attendance and participation, and document same in the medical record     CERTIFIED PEER SPECIALIST INTERVENTIONS:    Complete peer assessment with patient to assess their needs and identify their goals to complete while in the recovery program as well as once discharged into the community  Patient will complete WRAP Plan, Crisis Plan and 5 Life Domains  Patient will attend 50% of groups offered on the unit  Patient will complete a goal card weekly      Outcome: Progressing  Goal: Complete daily ADLs, including personal hygiene independently, as able  Description  Interventions:  - Observe, teach, and assist patient with ADLS  - Monitor and promote a balance of rest/activity, with adequate nutrition and elimination   Outcome: Progressing     Problem: Depression  Goal: Refrain from isolation  Description  Interventions:  - Develop a trusting relationship   - Encourage socialization   Outcome: Progressing     Problem: RESPIRATORY - ADULT  Goal: Achieves optimal ventilation and oxygenation  Description  INTERVENTIONS:  - Assess for changes in respiratory status  - Assess for changes in mentation and behavior  - Position to facilitate oxygenation and minimize respiratory effort  - Oxygen administration by appropriate delivery method based on oxygen saturation (per order) or ABGs  - Initiate smoking cessation education as indicated  - Encourage broncho-pulmonary hygiene including cough, deep breathe, Incentive Spirometry  - Assess the need for suctioning and aspirate as needed  - Assess and instruct to report SOB or any respiratory difficulty  - Respiratory Therapy support as indicated  Outcome: Progressing     Problem: Alteration in Thoughts and Perception  Goal: Recognize dysfunctional thoughts, communicate reality-based thoughts at the time of discharge  Description  Interventions:  - Provide medication and psycho-education to assist patient in compliance and developing insight into his/her illness   Outcome: Not Progressing     Problem: Nutrition/Hydration-ADULT  Goal: Nutrient/Hydration intake appropriate for improving, restoring or maintaining nutritional needs  Description  Monitor and assess patient's nutrition/hydration status for malnutrition  Collaborate with interdisciplinary team and initiate plan and interventions as ordered  Monitor patient's weight and dietary intake as ordered or per policy  Utilize nutrition screening tool and intervene as necessary  Determine patient's food preferences and provide high-protein, high-caloric foods as appropriate       INTERVENTIONS:  - Monitor oral intake, urinary output, labs, and treatment plans  - Assess nutrition and hydration status and recommend course of action  - Evaluate amount of meals eaten  - Assist patient with eating if necessary   - Allow adequate time for meals  - Recommend/ encourage appropriate diets, oral nutritional supplements, and vitamin/mineral supplements  - Order, calculate, and assess calorie counts as needed  - Recommend, monitor, and adjust tube feedings and TPN/PPN based on assessed needs  - Assess need for intravenous fluids  - Provide specific nutrition/hydration education as appropriate  - Include patient/family/caregiver in decisions related to nutrition  Outcome: Not Progressing     [address] has been relentless in her requests for an accu check  Has asked every staff person multiple times  Reminded her she is not diabetic nor is she historically hypoglycemic, is free of such symptoms  "I'm lightheaded " Reality presented to her that this may come from prolonged poorer intake, from poor water intake & lower BP, but, is not hypoglycemic  Pointed out to her that can expect weight loss if continues current eating pattern, but, not hypoglycemia as is getting some intake  At meal she had juice, milk, coffee & most of a cookie  For HS snack had 2 yogurts mixed in large cup of milk  Did allow staff to assist her as needed (mostly hair care) w/showering, shampooing, changing clothes & bed linens  Felt better for this accomplishment  Did do her Incentive Spirometry reaching volumes of 1100-1250ml  Hoping tomorrow for "a piece of pie" from her sister & a phone call w/her son for Jaclyn  Has been visible on unit  Took part in PM Group  Wearing now her QHS humidified nasal O2 @ 1L for bed

## 2019-11-28 NOTE — PLAN OF CARE
Problem: Alteration in Thoughts and Perception  Goal: Treatment Goal: Gain control of psychotic behaviors/thinking, reduce/eliminate presenting symptoms and demonstrate improved reality functioning upon discharge  Outcome: Not Progressing  Goal: Verbalize thoughts and feelings  Description  Interventions:  - Promote a nonjudgmental and trusting relationship with the patient through active listening and therapeutic communication  - Assess patient's level of functioning, behavior and potential for risk  - Engage patient in 1 on 1 interactions for a minimum of 15 minutes each session  - Encourage patient to express fears, feelings, frustrations, and discuss symptoms    - [place] patient to reality, help patient recognize reality-based thinking   - Administer medications as ordered and assess for potential side effects  - Provide the patient education related to the signs and symptoms of the illness and desired effects of prescribed medications  Outcome: Not Progressing  Goal: Agree to be compliant with medication regime, as prescribed and report medication side effects  Description  Interventions:  - Offer appropriate PRN medication and supervise ingestion; conduct aims, as needed   Outcome: Not Progressing  Goal: Attend and participate in unit activities, including therapeutic, recreational, and educational groups  Description  Interventions:  - Provide therapeutic and educational activities daily, encourage attendance and participation, and document same in the medical record     CERTIFIED PEER SPECIALIST INTERVENTIONS:    Complete peer assessment with patient to assess their needs and identify their goals to complete while in the recovery program as well as once discharged into the community  Patient will complete WRAP Plan, Crisis Plan and 5 Life Domains  Patient will attend 50% of groups offered on the unit  Patient will complete a goal card weekly      Outcome: Not Progressing  Goal: Recognize dysfunctional thoughts, communicate reality-based thoughts at the time of discharge  Description  Interventions:  - Provide medication and psycho-education to assist patient in compliance and developing insight into his/her illness   Outcome: Not Progressing  Goal: Complete daily ADLs, including personal hygiene independently, as able  Description  Interventions:  - Observe, teach, and assist patient with ADLS  - Monitor and promote a balance of rest/activity, with adequate nutrition and elimination   Outcome: Not Progressing     Problem: Ineffective Coping  Goal: Identifies ineffective coping skills  Outcome: Not Progressing  Goal: Identifies healthy coping skills  Outcome: Not Progressing  Goal: Demonstrates healthy coping skills  Outcome: Not Progressing  Goal: Participates in unit activities  Description  Interventions:  - Provide therapeutic environment   - Provide required programming   - Redirect inappropriate behaviors   Outcome: Not Progressing  Goal: Patient/Family participate in treatment and DC plans  Description  Interventions:  - Provide therapeutic environment  Outcome: Not Progressing  Goal: Patient/Family verbalizes awareness of resources  Outcome: Not Progressing  Goal: Understands least restrictive measures  Description  Interventions:  - Utilize least restrictive behavior  Outcome: Not Progressing     Problem: Risk for Self Injury/Neglect  Goal: Treatment Goal: Remain safe during length of stay, learn and adopt new coping skills, and be free of self-injurious ideation, impulses and acts at the time of discharge  Outcome: Not Progressing  Goal: Verbalize thoughts and feelings  Description  Interventions:  - Assess and re-assess patient's lethality and potential for self-injury  - Engage patient in 1:1 interactions, daily, for a minimum of 15 minutes  - Encourage patient to express feelings, fears, frustrations, hopes  - Establish rapport/trust with patient   Outcome: Not Progressing  Goal: Refrain from harming self  Description  Interventions:  - Monitor patient closely, per order  - Develop a trusting relationship  - Supervise medication ingestion, monitor effects and side effects   Outcome: Not Progressing  Goal: Attend and participate in unit activities, including therapeutic, recreational, and educational groups  Description  Interventions:  - Provide therapeutic and educational activities daily, encourage attendance and participation, and document same in the medical record  - Obtain collateral information, encourage visitation and family involvement in care   Outcome: Not Progressing  Goal: Recognize maladaptive responses and adopt new coping mechanisms  Outcome: Not Progressing  Goal: Complete daily ADLs, including personal hygiene independently, as able  Description  Interventions:  - Observe, teach, and assist patient with ADLS  - Monitor and promote a balance of rest/activity, with adequate nutrition and elimination  Outcome: Not Progressing     Problem: Depression  Goal: Treatment Goal: Demonstrate behavioral control of depressive symptoms, verbalize feelings of improved mood/affect, and adopt new coping skills prior to discharge  Outcome: Not Progressing  Goal: Verbalize thoughts and feelings  Description  Interventions:  - Assess and re-assess patient's level of risk   - Engage patient in 1:1 interactions, daily, for a minimum of 15 minutes   - Encourage patient to express feelings, fears, frustrations, hopes   Outcome: Not Progressing  Goal: Refrain from isolation  Description  Interventions:  - Develop a trusting relationship   - Encourage socialization   Outcome: Not Progressing  Goal: Refrain from self-neglect  Outcome: Not Progressing     Problem: Anxiety  Goal: Anxiety is at manageable level  Description  Interventions:  - Assess and monitor patient's anxiety level     - Monitor for signs and symptoms of anxiety both physical and emotional (heart palpitations, chest pain, shortness of breath, headaches, nausea, feeling jumpy, restlessness, irritable, apprehensive)  - Collaborate with interdisciplinary team and initiate plan and interventions as ordered  - [place] patient to unit/surroundings  - Explain treatment plan  - Encourage participation in care  - Encourage verbalization of concerns/fears  - Identify coping mechanisms  - Assist in developing anxiety-reducing skills  - Administer/offer alternative therapies  - Limit or eliminate stimulants  Outcome: Not Progressing     Problem: Alteration in Orientation  Goal: Interact with staff daily  Description  Interventions:  - Assess and re-assess patient's level of orientation  - Engage patient in 1 on 1 interactions, daily, for a minimum of 15 minutes   - Establish rapport/trust with patient   Outcome: Not Progressing  Goal: Cooperate with recommended testing/procedures  Description  Interventions:  - Determine need for ancillary testing  - Observe for mental status changes  - Implement falls/precaution protocol   Outcome: Not Progressing     Problem: PAIN - ADULT  Goal: Verbalizes/displays adequate comfort level or baseline comfort level  Description  Interventions:  - Encourage patient to monitor pain and request assistance  - Assess pain using appropriate pain scale  - Administer analgesics based on type and severity of pain and evaluate response  - Implement non-pharmacological measures as appropriate and evaluate response  - Consider cultural and social influences on pain and pain management  - Notify physician/advanced practitioner if interventions unsuccessful or patient reports new pain  Outcome: Not Progressing     Problem: SAFETY ADULT  Goal: Patient will remain free of falls  Description  INTERVENTIONS:  - Assess patient frequently for physical needs  -  Identify cognitive and physical deficits and behaviors that affect risk of falls    -  [place] fall precautions as indicated by assessment   - Educate patient/family on patient safety including physical limitations  - Instruct patient to call for assistance with activity based on assessment  - Modify environment to reduce risk of injury  - Consider OT/PT consult to assist with strengthening/mobility  Outcome: Not Progressing     Problem: RESPIRATORY - ADULT  Goal: Achieves optimal ventilation and oxygenation  Description  INTERVENTIONS:  - Assess for changes in respiratory status  - Assess for changes in mentation and behavior  - Position to facilitate oxygenation and minimize respiratory effort  - Oxygen administration by appropriate delivery method based on oxygen saturation (per order) or ABGs  - Initiate smoking cessation education as indicated  - Encourage broncho-pulmonary hygiene including cough, deep breathe, Incentive Spirometry  - Assess the need for suctioning and aspirate as needed  - Assess and instruct to report SOB or any respiratory difficulty  - Respiratory Therapy support as indicated  Outcome: Not Progressing     Problem: DISCHARGE PLANNING  Goal: Discharge to home or other facility with appropriate resources  Description  INTERVENTIONS:  - Conduct assessment to determine patient/family and health care team treatment goals, and need for post-acute services based on payer coverage, community resources, and patient preferences, and barriers to discharge  - Address psychosocial, clinical, and financial barriers to discharge as identified in assessment in conjunction with the patient/family and health care team  - Assist the patient in reintegration back into the community by removing barriers which may hinder a successful discharge once deemed stable  - Arrange appropriate level of post-acute services according to patient's needs and preference and payer coverage in collaboration with the physician and health care team  - Communicate with and update the patient/family, physician, and health care team regarding progress on the discharge plan  - Arrange appropriate transportation to post-acute venues    Outcome: Not Progressing     Problem: SLEEP DISTURBANCE  Goal: Will exhibit normal sleeping pattern  Description  Interventions:  -  Assess the patients sleep pattern, noting recent changes  - Administer medication as ordered  - Decrease environmental stimuli, including noise, as appropriate during the night  - Encourage the patient to actively participate in unit groups and or exercise during the day to enhance ability to achieve adequate sleep at night  - Assess the patient, in the morning, encouraging a description of sleep experience  Outcome: Not Progressing     Problem: Nutrition/Hydration-ADULT  Goal: Nutrient/Hydration intake appropriate for improving, restoring or maintaining nutritional needs  Description  Monitor and assess patient's nutrition/hydration status for malnutrition  Collaborate with interdisciplinary team and initiate plan and interventions as ordered  Monitor patient's weight and dietary intake as ordered or per policy  Utilize nutrition screening tool and intervene as necessary  Determine patient's food preferences and provide high-protein, high-caloric foods as appropriate  INTERVENTIONS:  - Monitor oral intake, urinary output, labs, and treatment plans  - Assess nutrition and hydration status and recommend course of action  - Evaluate amount of meals eaten  - Assist patient with eating if necessary   - Allow adequate time for meals  - Recommend/ encourage appropriate diets, oral nutritional supplements, and vitamin/mineral supplements  - Order, calculate, and assess calorie counts as needed  - Recommend, monitor, and adjust tube feedings and TPN/PPN based on assessed needs  - Assess need for intravenous fluids  - Provide specific nutrition/hydration education as appropriate  - Include patient/family/caregiver in decisions related to nutrition  Outcome: Not Progressing    Patient is isolative to room and bed  Compliant with meds   Rates depression and anxiety at 5:10 stating "it's situational"  No somatic complaints  Ate in dining room due to hygiene improved  Ate 75% of breakfast (oatmeal) and 25% of lunch (fluids)  No groups   Will continue progress in recovery program

## 2019-11-29 NOTE — PROGRESS NOTES
Pharmacy Clozapine Monitoring Progress Note     Roselia Woody is receiving a total daily dose of 150 mg of clozapine divided as 75mg at 1600 and 75mg at 2000  Pharmacist Recommendations Based on Assessment and Plan (below):    1  Patient is Clozapine-REMS eligible, ANC was updated, with weekly monitoring frequency and the next [address] is due on 12/06/2019           Assessment/Plan:    Phase of Treatment:     Current phase of treatment is initation/titration  Patient Clozapine REMS Eligibility:     Patient is eligible to receive clozapine and the [address] monitoring frequency is weekly per clozapine REMS  The patient's latest [address] value has been updated into the Clozapine-REMS program          ANC and Clozapine Level (if available)     The most recent [address] was   Lab Results   Component Value Date    NEUTROABS 3 20 11/29/2019    and the next lab is due on 12/6/19  Last Clozapine level (if available):    0   Lab Value Date/Time    CLOZAPINE 324 (L) 08/16/2019 1131     0   Lab Value Date/Time    NCLOZIP 207 08/16/2019 1131           Monitoring Parameters for Clozapine:     Clozapine Adverse Effect Suggested Monitoring Patient's Current Status    Agranulocytosis ANC baseline and then repeat weekly for first 6 months and every 2 weeks for the second 6 months  Maintenance after one year of therapy every month  The most recent [address] was   Lab Results   Component Value Date    NEUTROABS 3 20 11/29/2019       This ANC is considered normal range (ANC >/= 1500/mcL)  Continue current treatment and monitoring course  Respiratory Depression Please ensure patient is not on concomitant respiratory lowering medications such as benzodiazepines, sedative hypnotics (eg  zolpidem) This patient is not on respiratory depression lowering medications   Myocarditis Baseline and weekly EKG, CRP, BNP, and troponin  Weekly symptomatic complaints of fatigue, dyspnea, tachycardia, chest pain, palpitations, and fever for the first 4 weeks  Last EKG Results on  11/18/19 showed:     "Sinus rhythm with occasional Premature ventricular complexes  Left axis deviation  Low voltage QRS  Inferior infarct (cited on or before 18-NOV-2019)  Abnormal ECG  When compared with ECG of 21-AUG-2019 11:33,  Premature ventricular complexes are now Present  Confirmed by Erich Noble ([id number]) on 11/19/2019 10:18:37 PM"     Last QTC value was:  0   Lab Value Date/Time    QTCINT 482 11/18/2019 1319       Last BNP was: No results found for: NTBNP    Last Troponin was:  0   Lab Value Date/Time    TROPONINI <0 01 05/01/2019 1318    TROPONINI <0 04 05/10/2015 1948        Orthostatic hypotension/  bradycardia Blood pressure and vital signs      Monitor blood pressure and orthostatic hypotension at least twice daily upon initiation and continue to follow at least once daily afterwards  Patient's last recorded vital signs:       BP 96/59 (BP Location: Left arm)   Pulse 72   Temp 97 6 °F (36 4 °C) (Temporal)   Resp 16   Ht 5' 4" (1 626 m)   Wt 65 6 kg (144 lb 9 6 oz)   SpO2 96%   BMI 24 82 kg/m²             Constipation (bowel obstruction due to high anticholinergic clozapine burden) Assess at baseline and weekly during first four months of therapy  Docusate 100mg BID and Miralax 17 grams daily recommended initially  Prophylactic bowel regimen ordered and includes: sucralfate   Sialorrhea Assess at baseline and weekly during first four months of therapy  May be managed using sublingual anticholinergic preparations (atropine 1% eye drops)  Patient is not experiencing sialorrhea  This will continue to be monitored  Please note that per current REMS protocol the provider can submit a treatment rationale that justifies clozapine treatment even if patient parameters are outside of REMS recommendations  The Clozapine REMS is an FDA-mandated program implemented by the manufacturers of clozapine  ([employer]  com/CpmgClozapineUI/home  u)  Pharmacy will continue to follow patient with team, thank you    Electronically signed by: Serjio Garcia, PharmD, [id number], Clinical Pharmacist - Psychiatry

## 2019-11-29 NOTE — ASSESSMENT & PLAN NOTE
Psychiatry Progress Note  Patient did finally take a shower 2 days ago after over 12 days  However she is still somatically preoccupied claiming that she cannot swallow or has low blood sugar and has been demanding to check her sugar  She was asked is eat in the hallway next to the nurse's station away from the main group in the dining room because of severe body order after several peers complained but now she things that it is better for her to sit there so that she can be closer to the nursing staff and therefore we may have to stop doing that in future  She was again told that the expectation is that she should take showers twice a week at least or at the least once a week   Iman Jasso No signs or sxs of agranulocytosis or myocarditis or endocarditis and she is moving her bowels so far with no issues   She was again reminded to work with the program and work towards going back to the [facility] and start attending groups which she has more than 25% last week but she was told to attend to her ADLs skills like continuing to take showers and changing clothes as well before we can look into discharging her back to the [facility] care home but she remains passive-aggressive and refuses to do anything to help herself   Has been compliant with medications including the recent increase in Zoloft so for and is again accusatory to staff and paranoid and demanding and psychosomatic as usual but was polite and friendly and thought that the medical resident who came with me to see her 2 days ago is her boyfriend!     Current medications:    Current Facility-Administered Medications:     acetaminophen (TYLENOL) tablet 650 mg, 650 mg, Oral, Q6H PRN, Roberto Shankar MD    albuterol (PROVENTIL HFA,VENTOLIN HFA) inhaler 2 puff, 2 puff, Inhalation, Q4H PRN, Roberto Shankar MD, 2 puff at 10/11/19 0424    aluminum-magnesium hydroxide-simethicone (MYLANTA) 200-200-20 mg/5 mL oral suspension 15 mL, 15 mL, Oral, Q4H PRN, Lum Hews Taz Ge MD    ammonium lactate (LAC-HYDRIN) 12 % lotion 1 application, 1 application, Topical, BID PRN, Jill Gayle MD    benzonatate (TESSALON PERLES) capsule 100 mg, 100 mg, Oral, TID PRN, Jill Gayle MD    benztropine (COGENTIN) injection 1 mg, 1 mg, Intramuscular, Q8H PRN, Jill Gayle MD    cloZAPine (CLOZARIL) tablet 25 mg, 25 mg, Oral, BID, Jill Gayle MD, 25 mg at 11/28/19 2159    cloZAPine (CLOZARIL) tablet 50 mg, 50 mg, Oral, BID, Jill Gayle MD, 50 mg at 11/28/19 2159    EPINEPHrine PF (ADRENALIN) 1 mg/mL injection 0 15 mg, 0 15 mg, Intramuscular, Once PRN, Jill Gayle MD    fluticasone-vilanterol (BREO ELLIPTA) 200-25 MCG/INH inhaler 1 puff, 1 puff, Inhalation, Daily, Jill Gayle MD, 1 puff at 11/29/19 0819    ketotifen (ZADITOR) 0 025 % ophthalmic solution 1 drop, 1 drop, Right Eye, BID PRN, Jill Gayle MD    levothyroxine tablet 125 mcg, 125 mcg, Oral, Early Morning, Jill Gayle MD, 125 mcg at 11/29/19 0539    magnesium hydroxide (MILK OF MAGNESIA) 400 mg/5 mL oral suspension 30 mL, 30 mL, Oral, Daily PRN, Jill Gayle MD    montelukast (SINGULAIR) tablet 10 mg, 10 mg, Oral, HS, Jill Gayle MD, 10 mg at 11/12/19 2141    OLANZapine (ZyPREXA) IM injection 5 mg, 5 mg, Intramuscular, Q8H PRN, Jill Gayle MD    OLANZapine (ZyPREXA) tablet 5 mg, 5 mg, Oral, Q8H PRN, Jill Gayle MD    ondansetron (ZOFRAN-ODT) dispersible tablet 4 mg, 4 mg, Oral, Q6H PRN, Jill Gayle MD, 4 mg at 11/09/19 1223    pantoprazole (PROTONIX) EC tablet 40 mg, 40 mg, Oral, Early Morning, Jill Gayle MD, 40 mg at 11/29/19 0539    polyethylene glycol (MIRALAX) packet 17 g, 17 g, Oral, Daily PRN, Jill Gayle MD    polyvinyl alcohol (LIQUIFILM TEARS) 1 4 % ophthalmic solution 1 drop, 1 drop, Both Eyes, Q3H PRN, Jill Gayle MD    sertraline (ZOLOFT) tablet 50 mg, 50 mg, Oral, BID, Jill Gayle MD, 50 mg at 11/29/19 0819    sucralfate (CARAFATE) oral suspension 1,000 mg, 1,000 mg, Oral, BID, Anmol Rice MD Melissa, 1,000 mg at 11/29/19 0616    theophylline (JEF-24) 24 hr capsule 200 mg, 200 mg, Oral, Daily, Greer Beltran MD, 200 mg at 11/29/19 0819    tiotropium (SPIRIVA) capsule for inhaler 18 mcg, 18 mcg, Inhalation, Daily, Greer Beltran MD, 18 mcg at 11/29/19 0819    traZODone (DESYREL) tablet 25 mg, 25 mg, Oral, HS PRN, Greer Beltran MD  Justification if on more than two antipsychotics:  Only on his clozapine  Side effects if any:  None    Risks , benefits, side effects and precautions of medications discussed with patient and reminded patient to let us know any problems with medications     Objective:     Vital Signs:  Vitals:    11/28/19 1605 11/28/19 2005 11/29/19 0730 11/29/19 0732   BP: 94/64 100/60 102/74 96/59   BP Location: Left arm Left arm Left arm Left arm   Pulse: 85 81 79 72   Resp: 16 16 16    Temp: 98 9 °F (37 2 °C) 97 9 °F (36 6 °C) 97 6 °F (36 4 °C)    TempSrc: Temporal Temporal Temporal    SpO2: 97% 96%     Weight:       Height:         Appearance:  age appropriate, casually dressed and overweight older than stated age, wearing clean clothing friendly and cooperative laying in bed   Behavior:  evasive and guarded and suspicious and preoccupied with psychosomatic complaints   Speech:  normal pitch and normal volume but circumstantial and tangential with stilted speech off and on   Mood:  anxious and dysthymic   Affect:  mood-congruent, elated and entitled anxious and irritated and sad at times but pleasant today when approached   Thought Process:  goal directed and illogical slightly pressured and tangential and talks as if in a court of law   Thought Content:  Delusional  believing she cannot breathe or cannot swallow or having low blood sugar demanding extra juice or having cancer which are all fixated psychosomatic delusions     No current suicidal homicidal thoughts intent or plans reported  No phobias obsessions or compulsions reported    Also accuses staff of tampering with her medications and her spirometer and oxygen concentrator  Now things a medical resident who accompanied me 2 days ago is her boyfriend   Perceptual Disturbances: None and does not appear responding to internal stimuli at times   Risk Potential: Tendency to not care for herself    Sensorium:  person, place, time/date, situation, day of week, month of year and time   Cognition:  grossly intact   Consciousness:  alert and awake    Attention: Intact concentration and attention span   Intellect: Considered to be at least of average intelligence   Insight:  limited and in denial of her illness   Judgment: poor      Motor Activity: no abnormal movements         Recent Labs:  Results Reviewed     None          I/O Past 24 hours:  No intake/output data recorded  No intake/output data recorded  Assessment / Plan:     Schizoaffective disorder, [facility]      Reason for continued inpatient care:  Because of underlying paranoia and refusal to go back to the personal care home and inability to care for herself on her own  Acceptance by patient:  Accepting  Shannan Jaramillo in recovery:  About living in another personal care home once she feels better  Understanding of medications :  Has some understanding  Involved in reintegration process:  Adjusting to the unit  Trusting in relatoinship with psychiatrist:  Trusting    Recommended Treatment:    Medication changes:  1) none today  Non-pharmacological treatments  1) continue with  individual therapy group therapy, milieu therapy and occupational therapy and milieu therapy involving multidisciplinary team approach with psychotherapist, case management, nursing, peer support services, art therapist etc using recovery principles and psycho-education about accepting illness and need for treatment     2) encourage to cocoperate with behavior plan  3) encourage to attend to ADLs like taking showers and wearing clean clothes   4) Encourage to attend groups   Safety  1) Safety/communication plan established targeting dynamic risk factors above  Discharge Plan most likely back to the a:  Personal care boarding home with act services once her delusions are managed and mood becomes more stabilized and she becomes more receptive to treatment compliance    Counseling / Coordination of Care    Total floor / unit time spent today 15 minutes  Greater than 50% of total time was spent with the patient and / or family counseling and / or coordination of care  A description of the counseling / coordination of care  Patient's Rights, confidentiality and exceptions to confidentiality, use of automated medical record, [facility] staff access to medical record, and consent to treatment reviewed      Sandhya Bolaños MD

## 2019-11-29 NOTE — PLAN OF CARE
Problem: Alteration in Thoughts and Perception  Goal: Agree to be compliant with medication regime, as prescribed and report medication side effects  Description  Interventions:  - Offer appropriate PRN medication and supervise ingestion; conduct aims, as needed   Outcome: Progressing  Goal: Attend and participate in unit activities, including therapeutic, recreational, and educational groups  Description  Interventions:  - Provide therapeutic and educational activities daily, encourage attendance and participation, and document same in the medical record     CERTIFIED PEER SPECIALIST INTERVENTIONS:    Complete peer assessment with patient to assess their needs and identify their goals to complete while in the recovery program as well as once discharged into the community  Patient will complete WRAP Plan, Crisis Plan and 5 Life Domains  Patient will attend 50% of groups offered on the unit  Patient will complete a goal card weekly      Outcome: Not Progressing  Goal: Complete daily ADLs, including personal hygiene independently, as able  Description  Interventions:  - Observe, teach, and assist patient with ADLS  - Monitor and promote a balance of rest/activity, with adequate nutrition and elimination   Outcome: Not Progressing     Problem: Ineffective Coping  Goal: Participates in unit activities  Description  Interventions:  - Provide therapeutic environment   - Provide required programming   - Redirect inappropriate behaviors   Outcome: Not Progressing     Problem: Depression  Goal: Refrain from isolation  Description  Interventions:  - Develop a trusting relationship   - Encourage socialization   Outcome: Not Progressing    Priscilla Long has been isolative to her room and bed for the majority of the shift, napping intermittently, did not attend groups but did come out for her scheduled medications and meals which she only ate 25 % of her breakfast which consisted of cream of wheat (similar to oatmeal) and 25% of her lunch (pasta)  Appetite poor as she verbalizes that she still is afraid to choke on her food but no other somatic complaints voiced  Denies feeling anxious or depressed at this time of documentation  Will continue to monitor

## 2019-11-29 NOTE — PROGRESS NOTES
Daily Round: Showered 11/27/19  Continues with fear of choking/ swallowing foods  Not participating in programming

## 2019-11-29 NOTE — PLAN OF CARE
Problem: Alteration in Thoughts and Perception  Goal: Agree to be compliant with medication regime, as prescribed and report medication side effects  Description  Interventions:  - Offer appropriate PRN medication and supervise ingestion; conduct aims, as needed   Outcome: Progressing     Problem: Risk for Self Injury/Neglect  Goal: Verbalize thoughts and feelings  Description  Interventions:  - Assess and re-assess patient's lethality and potential for self-injury  - Engage patient in 1:1 interactions, daily, for a minimum of 15 minutes  - Encourage patient to express feelings, fears, frustrations, hopes  - Establish rapport/trust with patient   Outcome: Progressing     Problem: RESPIRATORY - ADULT  Goal: Achieves optimal ventilation and oxygenation  Description  INTERVENTIONS:  - Assess for changes in respiratory status  - Assess for changes in mentation and behavior  - Position to facilitate oxygenation and minimize respiratory effort  - Oxygen administration by appropriate delivery method based on oxygen saturation (per order) or ABGs  - Initiate smoking cessation education as indicated  - Encourage broncho-pulmonary hygiene including cough, deep breathe, Incentive Spirometry  - Assess the need for suctioning and aspirate as needed  - Assess and instruct to report SOB or any respiratory difficulty  - Respiratory Therapy support as indicated  Outcome: Progressing     Problem: Alteration in Thoughts and Perception  Goal: Attend and participate in unit activities, including therapeutic, recreational, and educational groups  Description  Interventions:  - Provide therapeutic and educational activities daily, encourage attendance and participation, and document same in the medical record     CERTIFIED PEER SPECIALIST INTERVENTIONS:    Complete peer assessment with patient to assess their needs and identify their goals to complete while in the recovery program as well as once discharged into the community  Patient will complete WRAP Plan, Crisis Plan and 5 Life Domains  Patient will attend 50% of groups offered on the unit  Patient will complete a goal card weekly  Outcome: Not Progressing  Goal: Recognize dysfunctional thoughts, communicate reality-based thoughts at the time of discharge  Description  Interventions:  - Provide medication and psycho-education to assist patient in compliance and developing insight into his/her illness   Outcome: Not Progressing     Problem: Depression  Goal: Refrain from isolation  Description  Interventions:  - Develop a trusting relationship   - Encourage socialization   Outcome: Not Progressing     [address] has been isolative to her room & bed throughout shift  She has come out for meal, snack, scheduled medicine  For meal had only juice & milk, for HS snack had only ice cream  Says unable to eat the solids; fears choking, wanting to sit in front of nurses station "in case I choke"  Sent back to dining room  Took all scheduled medicine except the Singulair  Used the Book&Table reaching volumes of 1250ml  Did not attend PM Group  Wearing now her QHS humidified nasal O2 @ 1L for bed

## 2019-11-29 NOTE — PLAN OF CARE
Problem: PAIN - ADULT  Goal: Verbalizes/displays adequate comfort level or baseline comfort level  Description  Interventions:  - Encourage patient to monitor pain and request assistance  - Assess pain using appropriate pain scale  - Administer analgesics based on type and severity of pain and evaluate response  - Implement non-pharmacological measures as appropriate and evaluate response  - Consider cultural and social influences on pain and pain management  - Notify physician/advanced practitioner if interventions unsuccessful or patient reports new pain  Outcome: Progressing     Problem: SAFETY ADULT  Goal: Patient will remain free of falls  Description  INTERVENTIONS:  - Assess patient frequently for physical needs  -  Identify cognitive and physical deficits and behaviors that affect risk of falls    -  [place] fall precautions as indicated by assessment   - Educate patient/family on patient safety including physical limitations  - Instruct patient to call for assistance with activity based on assessment  - Modify environment to reduce risk of injury  - Consider OT/PT consult to assist with strengthening/mobility  Outcome: Progressing     Problem: RESPIRATORY - ADULT  Goal: Achieves optimal ventilation and oxygenation  Description  INTERVENTIONS:  - Assess for changes in respiratory status  - Assess for changes in mentation and behavior  - Position to facilitate oxygenation and minimize respiratory effort  - Oxygen administration by appropriate delivery method based on oxygen saturation (per order) or ABGs  - Initiate smoking cessation education as indicated  - Encourage broncho-pulmonary hygiene including cough, deep breathe, Incentive Spirometry  - Assess the need for suctioning and aspirate as needed  - Assess and instruct to report SOB or any respiratory difficulty  - Respiratory Therapy support as indicated  Outcome: Progressing     Problem: SLEEP DISTURBANCE  Goal: Will exhibit normal sleeping pattern  Description  Interventions:  -  Assess the patients sleep pattern, noting recent changes  - Administer medication as ordered  - Decrease environmental stimuli, including noise, as appropriate during the night  - Encourage the patient to actively participate in unit groups and or exercise during the day to enhance ability to achieve adequate sleep at night  - Assess the patient, in the morning, encouraging a description of sleep experience  Outcome: Noemy Arredondo is laying in bed with eyes closed, breath even and unlabored   On O2 with humidifier @1L/m via nasal cannula   Q 15 minutes rounding   Maintained on fall and SAFE precaution   No somatic complaint overnight   No respiratory distress   Will continue to monitor

## 2019-11-29 NOTE — PROGRESS NOTES
Progress Note - Nina Bello 1955, 60 y o  female MRN: [id number]    [facility]-57 Encounter: [id number]    Primary Care Provider: Kendrick Schultz PA-C   Date and time admitted to hospital: 7/23/2019  5:30 PM        * Schizoaffective disorder, bipolar type [facility])  Assessment & Plan  Psychiatry Progress Note  Patient did finally take a shower 2 days ago after over 12 days  However she is still somatically preoccupied claiming that she cannot swallow or has low blood sugar and has been demanding to check her sugar  She was asked is eat in the hallway next to the nurse's station away from the main group in the dining room because of severe body order after several peers complained but now she things that it is better for her to sit there so that she can be closer to the nursing staff and therefore we may have to stop doing that in future  She was again told that the expectation is that she should take showers twice a week at least or at the least once a week   Frank Marks No signs or sxs of agranulocytosis or myocarditis or endocarditis and she is moving her bowels so far with no issues   She was again reminded to work with the program and work towards going back to the [facility] and start attending groups which she has more than 25% last week but she was told to attend to her ADLs skills like continuing to take showers and changing clothes as well before we can look into discharging her back to the personal care home but she remains passive-aggressive and refuses to do anything to help herself   Has been compliant with medications including the recent increase in Zoloft so for and is again accusatory to staff and paranoid and demanding and psychosomatic as usual but was polite and friendly and thought that the medical resident who came with me to see her 2 days ago is her boyfriend!     Current medications:    Current Facility-Administered Medications:     acetaminophen (TYLENOL) tablet 650 mg, 650 mg, Oral, Q6H PRN, Ervin Little MD    albuterol (PROVENTIL HFA,VENTOLIN HFA) inhaler 2 puff, 2 puff, Inhalation, Q4H PRN, Ervin Little MD, 2 puff at 10/11/19 0424    aluminum-magnesium hydroxide-simethicone (MYLANTA) 200-200-20 mg/5 mL oral suspension 15 mL, 15 mL, Oral, Q4H PRN, Ervin Little MD    ammonium lactate (LAC-HYDRIN) 12 % lotion 1 application, 1 application, Topical, BID PRN, Ervin Little MD    benzonatate (TESSALON PERLES) capsule 100 mg, 100 mg, Oral, TID PRN, Ervin Little MD    benztropine (COGENTIN) injection 1 mg, 1 mg, Intramuscular, Q8H PRN, Ervin Little MD    cloZAPine (CLOZARIL) tablet 25 mg, 25 mg, Oral, BID, Ervin Little MD, 25 mg at 11/28/19 2159    cloZAPine (CLOZARIL) tablet 50 mg, 50 mg, Oral, BID, Ervin Little MD, 50 mg at 11/28/19 2159    EPINEPHrine PF (ADRENALIN) 1 mg/mL injection 0 15 mg, 0 15 mg, Intramuscular, Once PRN, Ervin Little MD    fluticasone-vilanterol (BREO ELLIPTA) 200-25 MCG/INH inhaler 1 puff, 1 puff, Inhalation, Daily, Ervin Little MD, 1 puff at 11/29/19 0819    ketotifen (ZADITOR) 0 025 % ophthalmic solution 1 drop, 1 drop, Right Eye, BID PRN, Ervin Little MD    levothyroxine tablet 125 mcg, 125 mcg, Oral, Early Morning, Ervin Little MD, 125 mcg at 11/29/19 0539    magnesium hydroxide (MILK OF MAGNESIA) 400 mg/5 mL oral suspension 30 mL, 30 mL, Oral, Daily PRN, Ervin Little MD    montelukast (SINGULAIR) tablet 10 mg, 10 mg, Oral, HS, Ervin Little MD, 10 mg at 11/12/19 2141    OLANZapine (ZyPREXA) IM injection 5 mg, 5 mg, Intramuscular, Q8H PRN, Ervin Little MD    OLANZapine (ZyPREXA) tablet 5 mg, 5 mg, Oral, Q8H PRN, Ervin Little MD    ondansetron (ZOFRAN-ODT) dispersible tablet 4 mg, 4 mg, Oral, Q6H PRN, Ervin Little MD, 4 mg at 11/09/19 1223    pantoprazole (PROTONIX) EC tablet 40 mg, 40 mg, Oral, Early Morning, Ervin Little MD, 40 mg at 11/29/19 0539    polyethylene glycol (MIRALAX) packet 17 g, 17 g, Oral, Daily PRN, MD Magalis Gonsales polyvinyl alcohol (LIQUIFILM TEARS) 1 4 % ophthalmic solution 1 drop, 1 drop, Both Eyes, Q3H PRN, Jeffrey Gallegos MD    sertraline (ZOLOFT) tablet 50 mg, 50 mg, Oral, BID, Jeffrey Gallegos MD, 50 mg at 11/29/19 6176    sucralfate (CARAFATE) oral suspension 1,000 mg, 1,000 mg, Oral, BID, Demetrice Cabezas MD, 1,000 mg at 11/29/19 0616    theophylline (JEF-24) 24 hr capsule 200 mg, 200 mg, Oral, Daily, Jeffrey Gallegos MD, 200 mg at 11/29/19 0819    tiotropium (SPIRIVA) capsule for inhaler 18 mcg, 18 mcg, Inhalation, Daily, Jeffrey Gallegos MD, 18 mcg at 11/29/19 0819    traZODone (DESYREL) tablet 25 mg, 25 mg, Oral, HS PRN, Jeffrey Gallegos MD  Justification if on more than two antipsychotics:  Only on his clozapine  Side effects if any:  None    Risks , benefits, side effects and precautions of medications discussed with patient and reminded patient to let us know any problems with medications     Objective:     Vital Signs:  Vitals:    11/28/19 1605 11/28/19 2005 11/29/19 0730 11/29/19 0732   BP: 94/64 100/60 102/74 96/59   BP Location: Left arm Left arm Left arm Left arm   Pulse: 85 81 79 72   Resp: 16 16 16    Temp: 98 9 °F (37 2 °C) 97 9 °F (36 6 °C) 97 6 °F (36 4 °C)    TempSrc: Temporal Temporal Temporal    SpO2: 97% 96%     Weight:       Height:         Appearance:  age appropriate, casually dressed and overweight older than stated age, wearing clean clothing friendly and cooperative laying in bed   Behavior:  evasive and guarded and suspicious and preoccupied with psychosomatic complaints   Speech:  normal pitch and normal volume but circumstantial and tangential with stilted speech off and on   Mood:  anxious and dysthymic   Affect:  mood-congruent, elated and entitled anxious and irritated and sad at times but pleasant today when approached   Thought Process:  goal directed and illogical slightly pressured and tangential and talks as if in a court of law   Thought Content:  Delusional  believing she cannot breathe or cannot swallow or having low blood sugar demanding extra juice or having cancer which are all fixated psychosomatic delusions     No current suicidal homicidal thoughts intent or plans reported  No phobias obsessions or compulsions reported  Also accuses staff of tampering with her medications and her spirometer and oxygen concentrator  Now things a medical resident who accompanied me 2 days ago is her boyfriend   Perceptual Disturbances: None and does not appear responding to internal stimuli at times   Risk Potential: Tendency to not care for herself    Sensorium:  person, place, time/date, situation, day of week, month of year and time   Cognition:  grossly intact   Consciousness:  alert and awake    Attention: Intact concentration and attention span   Intellect: Considered to be at least of average intelligence   Insight:  limited and in denial of her illness   Judgment: poor      Motor Activity: no abnormal movements         Recent Labs:  Results Reviewed     None          I/O Past 24 hours:  No intake/output data recorded  No intake/output data recorded          Assessment / Plan:     [facility]      Reason for continued inpatient care:  Because of underlying paranoia and refusal to go back to the personal care home and inability to care for herself on her own  Acceptance by patient:  Accepting  [facility] File in recovery:  About living in another personal care home once she feels better  Understanding of medications :  Has some understanding  Involved in reintegration process:  Adjusting to the unit  Trusting in relatoinship with psychiatrist:  Trusting    Recommended Treatment:    Medication changes:  1) none today  Non-pharmacological treatments  1) continue with  individual therapy group therapy, milieu therapy and occupational therapy and milieu therapy involving multidisciplinary team approach with psychotherapist, case management, nursing, peer support services, art therapist etc using recovery principles and psycho-education about accepting illness and need for treatment   2) encourage to cocoperate with behavior plan  3) encourage to attend to ADLs like taking showers and wearing clean clothes   4) Encourage to attend groups   Safety  1) Safety/communication plan established targeting dynamic risk factors above  Discharge Plan most likely back to the a:  Personal care boarding home with act services once her delusions are managed and mood becomes more stabilized and she becomes more receptive to treatment compliance    Counseling / Coordination of Care    Total floor / unit time spent today 15 minutes  Greater than 50% of total time was spent with the patient and / or family counseling and / or coordination of care  A description of the counseling / coordination of care  Patient's Rights, confidentiality and exceptions to confidentiality, use of automated medical record, [facility] access to medical record, and consent to treatment reviewed      Chichi Lockett MD

## 2019-11-30 NOTE — PROGRESS NOTES
Sleeping at this time, no distress noted, O2 on at 1L via NC  All precautions in place and maintained at this time   Monitoring continues

## 2019-11-30 NOTE — PLAN OF CARE
Problem: Alteration in Thoughts and Perception  Goal: Agree to be compliant with medication regime, as prescribed and report medication side effects  Description  Interventions:  - Offer appropriate PRN medication and supervise ingestion; conduct aims, as needed   Outcome: Progressing     Problem: Risk for Self Injury/Neglect  Goal: Verbalize thoughts and feelings  Description  Interventions:  - Assess and re-assess patient's lethality and potential for self-injury  - Engage patient in 1:1 interactions, daily, for a minimum of 15 minutes  - Encourage patient to express feelings, fears, frustrations, hopes  - Establish rapport/trust with patient   Outcome: Progressing     Problem: Depression  Goal: Refrain from isolation  Description  Interventions:  - Develop a trusting relationship   - Encourage socialization   Outcome: Progressing     Problem: RESPIRATORY - ADULT  Goal: Achieves optimal ventilation and oxygenation  Description  INTERVENTIONS:  - Assess for changes in respiratory status  - Assess for changes in mentation and behavior  - Position to facilitate oxygenation and minimize respiratory effort  - Oxygen administration by appropriate delivery method based on oxygen saturation (per order) or ABGs  - Initiate smoking cessation education as indicated  - Encourage broncho-pulmonary hygiene including cough, deep breathe, Incentive Spirometry  - Assess the need for suctioning and aspirate as needed  - Assess and instruct to report SOB or any respiratory difficulty  - Respiratory Therapy support as indicated  Outcome: Progressing     Problem: Nutrition/Hydration-ADULT  Goal: Nutrient/Hydration intake appropriate for improving, restoring or maintaining nutritional needs  Description  Monitor and assess patient's nutrition/hydration status for malnutrition  Collaborate with interdisciplinary team and initiate plan and interventions as ordered  Monitor patient's weight and dietary intake as ordered or per policy  Utilize nutrition screening tool and intervene as necessary  Determine patient's food preferences and provide high-protein, high-caloric foods as appropriate  INTERVENTIONS:  - Monitor oral intake, urinary output, labs, and treatment plans  - Assess nutrition and hydration status and recommend course of action  - Evaluate amount of meals eaten  - Assist patient with eating if necessary   - Allow adequate time for meals  - Recommend/ encourage appropriate diets, oral nutritional supplements, and vitamin/mineral supplements  - Order, calculate, and assess calorie counts as needed  - Recommend, monitor, and adjust tube feedings and TPN/PPN based on assessed needs  - Assess need for intravenous fluids  - Provide specific nutrition/hydration education as appropriate  - Include patient/family/caregiver in decisions related to nutrition  Outcome: Progressing     2015 [employer] had only fluids (juice, milk, sherbet) for hospital supper, but, when sister visited w/full thanksgiving meal, she ate 3/4 yams, 1/2 mashed potato, all turkey & a huge piece of pumpkin cheesecake  The only things left behind were vegetables & rolls  Came up for her supper medicines on her own  Sits out in phone chair in [place] when phone not in use  She is pleasant, pleased w/herself for intake  Uses the Incentive Spirometer achieving volumes of 1000-1250ml  Still a bit somatically worried; sought reassurance that today's CBC was normal, which it was

## 2019-11-30 NOTE — PROGRESS NOTES
Psychiatry Progress Note    Subjective: Interval History     The patient is lying in bed this morning  She is accusatory and states one of the nurses has a "personal vendetta against her "  Patient starts rambling about this  She needed to be redirected  Patient is somatic and has delusions at times that are also persecutory  She makes excuses why she cannot do things  She states she has been having issues swallowing however patient's sister brought in a Thanksgiving meal which patient ate with no issue  She continues to be medication compliant  She denies hallucinations or suicidal ideation      Behavior over the last 24 hours:  unchanged  Sleep: normal  Appetite: normal  Medication side effects: No  ROS: no complaints    Current medications:    Current Facility-Administered Medications:     acetaminophen (TYLENOL) tablet 650 mg, 650 mg, Oral, Q6H PRN, Carissa Willis MD    albuterol (PROVENTIL HFA,VENTOLIN HFA) inhaler 2 puff, 2 puff, Inhalation, Q4H PRN, Carissa Willis MD, 2 puff at 10/11/19 0424    aluminum-magnesium hydroxide-simethicone (MYLANTA) 200-200-20 mg/5 mL oral suspension 15 mL, 15 mL, Oral, Q4H PRN, Carissa Willis MD    ammonium lactate (LAC-HYDRIN) 12 % lotion 1 application, 1 application, Topical, BID PRN, Carissa Willis MD    benzonatate (TESSALON PERLES) capsule 100 mg, 100 mg, Oral, TID PRN, Carissa Willis MD    benztropine (COGENTIN) injection 1 mg, 1 mg, Intramuscular, Q8H PRN, Carissa Willis MD    cloZAPine (CLOZARIL) tablet 25 mg, 25 mg, Oral, BID, Carissa Willis MD, 25 mg at 11/29/19 2127    cloZAPine (CLOZARIL) tablet 50 mg, 50 mg, Oral, BID, Carissa Willis MD, 50 mg at 11/29/19 2127    EPINEPHrine PF (ADRENALIN) 1 mg/mL injection 0 15 mg, 0 15 mg, Intramuscular, Once PRN, Carissa Willis MD    fluticasone-vilanterol (BREO ELLIPTA) 200-25 MCG/INH inhaler 1 puff, 1 puff, Inhalation, Daily, Carissa Willis MD, 1 puff at 11/30/19 0915    ketotifen (ZADITOR) 0 025 % ophthalmic solution 1 drop, 1 drop, Right Eye, BID PRN, Amina Aparicio MD    levothyroxine tablet 125 mcg, 125 mcg, Oral, Early Morning, Amina Aparicio MD, 125 mcg at 11/30/19 0604    magnesium hydroxide (MILK OF MAGNESIA) 400 mg/5 mL oral suspension 30 mL, 30 mL, Oral, Daily PRN, Amina Aparicio MD    montelukast (SINGULAIR) tablet 10 mg, 10 mg, Oral, HS, Amina Aparicio MD, 10 mg at 11/12/19 2141    OLANZapine (ZyPREXA) IM injection 5 mg, 5 mg, Intramuscular, Q8H PRN, Amina Aparicio MD    OLANZapine (ZyPREXA) tablet 5 mg, 5 mg, Oral, Q8H PRN, Amina Aparicio MD    ondansetron (ZOFRAN-ODT) dispersible tablet 4 mg, 4 mg, Oral, Q6H PRN, Amina Aparicio MD, 4 mg at 11/09/19 1223    pantoprazole (PROTONIX) EC tablet 40 mg, 40 mg, Oral, Early Morning, Amina Aparicio MD, 40 mg at 11/30/19 0604    polyethylene glycol (MIRALAX) packet 17 g, 17 g, Oral, Daily PRN, Amina Aparicio MD    polyvinyl alcohol (LIQUIFILM TEARS) 1 4 % ophthalmic solution 1 drop, 1 drop, Both Eyes, Q3H PRN, Amina Aparicio MD    sertraline (ZOLOFT) tablet 50 mg, 50 mg, Oral, BID, Amina Aparicio MD, 50 mg at 11/30/19 0915    sucralfate (CARAFATE) oral suspension 1,000 mg, 1,000 mg, Oral, BID, Florence Rendon MD, 1,000 mg at 11/30/19 0604    theophylline (JEF-24) 24 hr capsule 200 mg, 200 mg, Oral, Daily, Amina Aparicio MD, 200 mg at 11/30/19 0915    tiotropium (SPIRIVA) capsule for inhaler 18 mcg, 18 mcg, Inhalation, Daily, Amina Aparicio MD, 18 mcg at 11/30/19 0915    traZODone (DESYREL) tablet 25 mg, 25 mg, Oral, HS PRN, Amina Aparicio MD    Current Problem List:    Patient Active Problem List   Diagnosis    Sepsis [facility]    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[facility]    Tobacco use disorder, continuous    Bipolar disorder ([facility] )    Left hip pain    Lactic acidosis    Hypokalemia    Hypomagnesemia    Compression fracture of L4 lumbar vertebra    Thoracic compression fracture ([facility]    Ventral hernia    Parapneumonic effusion    Acute on chronic respiratory failure with hypoxia ([facility] )    Chronic respiratory failure (HCC)    Hypophosphatemia    Elevated MCV    Schizoaffective disorder, bipolar type (HCC)    Acquired hypothyroidism    Gastroesophageal reflux disease without esophagitis    Abnormal CT of the chest    Excessive cerumen in left ear canal    Lipoma of right upper extremity    Polydipsia    Localized swelling of both lower legs    Noncompliant with deep vein thrombosis (DVT) prophylaxis    Allergic conjunctivitis of right eye    At risk for aspiration       Problem list reviewed 11/30/19     Objective:     Vital Signs:  Vitals:    11/29/19 0732 11/29/19 1629 11/29/19 2005 11/30/19 0730   BP: 96/59 110/66 102/70 117/76   BP Location: Left arm Left arm Left arm Right arm   Pulse: 72 82 100 92   Resp:  18 16 18   Temp:  97 8 °F (36 6 °C) 97 6 °F (36 4 °C) 97 6 °F (36 4 °C)   TempSrc:  Temporal Temporal    SpO2:  93% 96%    Weight:       Height:             Appearance:  age appropriate and casually dressed   Behavior:  guarded   Speech:  normal volume   Mood:  anxious   Affect:  mood-congruent   Thought Process:  goal directed   Thought Content:  delusions  persecutory   Perceptual Disturbances: None   Risk Potential: none   Sensorium:  person, place, situation and time   Cognition:  intact   Consciousness:  alert and awake    Attention: attention span and concentration were age appropriate   Intellect: average   Insight:  poor   Judgment: poor      Motor Activity: no abnormal movements       I/O Past 24 hours:  No intake/output data recorded  No intake/output data recorded  Labs:  Reviewed 11/30/19    Assessment / Plan:     [facility]ffective [facility]    Recommended Treatment:      Medication changes:  1) continue current medication regimen  Non-pharmacological treatments  1) Continue with group therapy, milieu therapy and occupational therapy  Safety  1) Safety/communication plan established targeting dynamic risk factors above      2) Risks, benefits, and possible side effects of medications explained to patient and patient verbalizes understanding  Counseling / Coordination of Care    Total floor / unit time spent today 20 minutes  Greater than 50% of total time was spent with the patient and / or family counseling and / or coordination of care  A description of the counseling / coordination of care  Patient's Rights, confidentiality and exceptions to confidentiality, use of automated medical record, [facility] staff access to medical record, and consent to treatment reviewed      Elvira Mooney PA-C

## 2019-11-30 NOTE — PLAN OF CARE
Problem: Alteration in Thoughts and Perception  Goal: Treatment Goal: Gain control of psychotic behaviors/thinking, reduce/eliminate presenting symptoms and demonstrate improved reality functioning upon discharge  Outcome: Not Progressing  Goal: Verbalize thoughts and feelings  Description  Interventions:  - Promote a nonjudgmental and trusting relationship with the patient through active listening and therapeutic communication  - Assess patient's level of functioning, behavior and potential for risk  - Engage patient in 1 on 1 interactions for a minimum of 15 minutes each session  - Encourage patient to express fears, feelings, frustrations, and discuss symptoms    - [place] patient to reality, help patient recognize reality-based thinking   - Administer medications as ordered and assess for potential side effects  - Provide the patient education related to the signs and symptoms of the illness and desired effects of prescribed medications  Outcome: Not Progressing  Goal: Agree to be compliant with medication regime, as prescribed and report medication side effects  Description  Interventions:  - Offer appropriate PRN medication and supervise ingestion; conduct aims, as needed   Outcome: Not Progressing  Goal: Attend and participate in unit activities, including therapeutic, recreational, and educational groups  Description  Interventions:  - Provide therapeutic and educational activities daily, encourage attendance and participation, and document same in the medical record     CERTIFIED PEER SPECIALIST INTERVENTIONS:    Complete peer assessment with patient to assess their needs and identify their goals to complete while in the recovery program as well as once discharged into the community  Patient will complete WRAP Plan, Crisis Plan and 5 Life Domains  Patient will attend 50% of groups offered on the unit  Patient will complete a goal card weekly      Outcome: Not Progressing  Goal: Recognize dysfunctional thoughts, communicate reality-based thoughts at the time of discharge  Description  Interventions:  - Provide medication and psycho-education to assist patient in compliance and developing insight into his/her illness   Outcome: Not Progressing  Goal: Complete daily ADLs, including personal hygiene independently, as able  Description  Interventions:  - Observe, teach, and assist patient with ADLS  - Monitor and promote a balance of rest/activity, with adequate nutrition and elimination   Outcome: Not Progressing     Problem: Ineffective Coping  Goal: Identifies ineffective coping skills  Outcome: Not Progressing  Goal: Identifies healthy coping skills  Outcome: Not Progressing  Goal: Demonstrates healthy coping skills  Outcome: Not Progressing  Goal: Participates in unit activities  Description  Interventions:  - Provide therapeutic environment   - Provide required programming   - Redirect inappropriate behaviors   Outcome: Not Progressing  Goal: Patient/Family participate in treatment and DC plans  Description  Interventions:  - Provide therapeutic environment  Outcome: Not Progressing  Goal: Patient/Family verbalizes awareness of resources  Outcome: Not Progressing  Goal: Understands least restrictive measures  Description  Interventions:  - Utilize least restrictive behavior  Outcome: Not Progressing     Problem: Risk for Self Injury/Neglect  Goal: Treatment Goal: Remain safe during length of stay, learn and adopt new coping skills, and be free of self-injurious ideation, impulses and acts at the time of discharge  Outcome: Not Progressing  Goal: Verbalize thoughts and feelings  Description  Interventions:  - Assess and re-assess patient's lethality and potential for self-injury  - Engage patient in 1:1 interactions, daily, for a minimum of 15 minutes  - Encourage patient to express feelings, fears, frustrations, hopes  - Establish rapport/trust with patient   Outcome: Not Progressing  Goal: Refrain from harming self  Description  Interventions:  - Monitor patient closely, per order  - Develop a trusting relationship  - Supervise medication ingestion, monitor effects and side effects   Outcome: Not Progressing  Goal: Attend and participate in unit activities, including therapeutic, recreational, and educational groups  Description  Interventions:  - Provide therapeutic and educational activities daily, encourage attendance and participation, and document same in the medical record  - Obtain collateral information, encourage visitation and family involvement in care   Outcome: Not Progressing  Goal: Recognize maladaptive responses and adopt new coping mechanisms  Outcome: Not Progressing  Goal: Complete daily ADLs, including personal hygiene independently, as able  Description  Interventions:  - Observe, teach, and assist patient with ADLS  - Monitor and promote a balance of rest/activity, with adequate nutrition and elimination  Outcome: Not Progressing     Problem: Depression  Goal: Treatment Goal: Demonstrate behavioral control of depressive symptoms, verbalize feelings of improved mood/affect, and adopt new coping skills prior to discharge  Outcome: Not Progressing  Goal: Verbalize thoughts and feelings  Description  Interventions:  - Assess and re-assess patient's level of risk   - Engage patient in 1:1 interactions, daily, for a minimum of 15 minutes   - Encourage patient to express feelings, fears, frustrations, hopes   Outcome: Not Progressing  Goal: Refrain from isolation  Description  Interventions:  - Develop a trusting relationship   - Encourage socialization   Outcome: Not Progressing  Goal: Refrain from self-neglect  Outcome: Not Progressing     Problem: Anxiety  Goal: Anxiety is at manageable level  Description  Interventions:  - Assess and monitor patient's anxiety level     - Monitor for signs and symptoms of anxiety both physical and emotional (heart palpitations, chest pain, shortness of breath, headaches, nausea, feeling jumpy, restlessness, irritable, apprehensive)  - Collaborate with interdisciplinary team and initiate plan and interventions as ordered  - [place] patient to unit/surroundings  - Explain treatment plan  - Encourage participation in care  - Encourage verbalization of concerns/fears  - Identify coping mechanisms  - Assist in developing anxiety-reducing skills  - Administer/offer alternative therapies  - Limit or eliminate stimulants  Outcome: Not Progressing     Problem: Alteration in Orientation  Goal: Interact with staff daily  Description  Interventions:  - Assess and re-assess patient's level of orientation  - Engage patient in 1 on 1 interactions, daily, for a minimum of 15 minutes   - Establish rapport/trust with patient   Outcome: Not Progressing  Goal: Cooperate with recommended testing/procedures  Description  Interventions:  - Determine need for ancillary testing  - Observe for mental status changes  - Implement falls/precaution protocol   Outcome: Not Progressing     Problem: PAIN - ADULT  Goal: Verbalizes/displays adequate comfort level or baseline comfort level  Description  Interventions:  - Encourage patient to monitor pain and request assistance  - Assess pain using appropriate pain scale  - Administer analgesics based on type and severity of pain and evaluate response  - Implement non-pharmacological measures as appropriate and evaluate response  - Consider cultural and social influences on pain and pain management  - Notify physician/advanced practitioner if interventions unsuccessful or patient reports new pain  Outcome: Not Progressing     Problem: SAFETY ADULT  Goal: Patient will remain free of falls  Description  INTERVENTIONS:  - Assess patient frequently for physical needs  -  Identify cognitive and physical deficits and behaviors that affect risk of falls    -  [place] fall precautions as indicated by assessment   - Educate patient/family on patient safety including physical limitations  - Instruct patient to call for assistance with activity based on assessment  - Modify environment to reduce risk of injury  - Consider OT/PT consult to assist with strengthening/mobility  Outcome: Not Progressing     Problem: RESPIRATORY - ADULT  Goal: Achieves optimal ventilation and oxygenation  Description  INTERVENTIONS:  - Assess for changes in respiratory status  - Assess for changes in mentation and behavior  - Position to facilitate oxygenation and minimize respiratory effort  - Oxygen administration by appropriate delivery method based on oxygen saturation (per order) or ABGs  - Initiate smoking cessation education as indicated  - Encourage broncho-pulmonary hygiene including cough, deep breathe, Incentive Spirometry  - Assess the need for suctioning and aspirate as needed  - Assess and instruct to report SOB or any respiratory difficulty  - Respiratory Therapy support as indicated  Outcome: Not Progressing     Problem: DISCHARGE PLANNING  Goal: Discharge to home or other facility with appropriate resources  Description  INTERVENTIONS:  - Conduct assessment to determine patient/family and health care team treatment goals, and need for post-acute services based on payer coverage, community resources, and patient preferences, and barriers to discharge  - Address psychosocial, clinical, and financial barriers to discharge as identified in assessment in conjunction with the patient/family and health care team  - Assist the patient in reintegration back into the community by removing barriers which may hinder a successful discharge once deemed stable  - Arrange appropriate level of post-acute services according to patient's needs and preference and payer coverage in collaboration with the physician and health care team  - Communicate with and update the patient/family, physician, and health care team regarding progress on the discharge plan  - Arrange appropriate transportation to post-acute venues    Outcome: Not Progressing     Problem: SLEEP DISTURBANCE  Goal: Will exhibit normal sleeping pattern  Description  Interventions:  -  Assess the patients sleep pattern, noting recent changes  - Administer medication as ordered  - Decrease environmental stimuli, including noise, as appropriate during the night  - Encourage the patient to actively participate in unit groups and or exercise during the day to enhance ability to achieve adequate sleep at night  - Assess the patient, in the morning, encouraging a description of sleep experience  Outcome: Not Progressing     Problem: Nutrition/Hydration-ADULT  Goal: Nutrient/Hydration intake appropriate for improving, restoring or maintaining nutritional needs  Description  Monitor and assess patient's nutrition/hydration status for malnutrition  Collaborate with interdisciplinary team and initiate plan and interventions as ordered  Monitor patient's weight and dietary intake as ordered or per policy  Utilize nutrition screening tool and intervene as necessary  Determine patient's food preferences and provide high-protein, high-caloric foods as appropriate  INTERVENTIONS:  - Monitor oral intake, urinary output, labs, and treatment plans  - Assess nutrition and hydration status and recommend course of action  - Evaluate amount of meals eaten  - Assist patient with eating if necessary   - Allow adequate time for meals  - Recommend/ encourage appropriate diets, oral nutritional supplements, and vitamin/mineral supplements  - Order, calculate, and assess calorie counts as needed  - Recommend, monitor, and adjust tube feedings and TPN/PPN based on assessed needs  - Assess need for intravenous fluids  - Provide specific nutrition/hydration education as appropriate  - Include patient/family/caregiver in decisions related to nutrition  Outcome: Not Progressing    Patient in bed, withdrawn and isolative to self  Out of bed for meals and snack only  Attended community meeting only  No hygiene  Required prompting for meds  Denies depression and anxiety today  No somatic complaints  Making excuses for not coming for meds and needing prompting stating "no one called me"  No insight into program requirements and compliance  Ate 50% of breakfast (cream of wheat and yogurt) and 25% of breakfast (all liquids)  Requests staff to sit with her while eating    Staff unable to accommodate that request   Will continue to monitor progress in recovery program

## 2019-11-30 NOTE — PROGRESS NOTES
2[address] Sania Neftaly was pleased w/her intake tonight  She ate a bag of potato chips for HS snack  Came up on own for HS medicine, taking all but the Singulair  Wearing now her QHS humidified nasal O2 @ 1L for bed

## 2019-12-01 NOTE — PLAN OF CARE
Problem: Alteration in Thoughts and Perception  Goal: Verbalize thoughts and feelings  Description  Interventions:  - Promote a nonjudgmental and trusting relationship with the patient through active listening and therapeutic communication  - Assess patient's level of functioning, behavior and potential for risk  - Engage patient in 1 on 1 interactions for a minimum of 15 minutes each session  - Encourage patient to express fears, feelings, frustrations, and discuss symptoms    - [place] patient to reality, help patient recognize reality-based thinking   - Administer medications as ordered and assess for potential side effects  - Provide the patient education related to the signs and symptoms of the illness and desired effects of prescribed medications  Outcome: Progressing  Goal: Agree to be compliant with medication regime, as prescribed and report medication side effects  Description  Interventions:  - Offer appropriate PRN medication and supervise ingestion; conduct aims, as needed   Outcome: Progressing     Problem: Alteration in Thoughts and Perception  Goal: Attend and participate in unit activities, including therapeutic, recreational, and educational groups  Description  Interventions:  - Provide therapeutic and educational activities daily, encourage attendance and participation, and document same in the medical record     CERTIFIED PEER SPECIALIST INTERVENTIONS:    Complete peer assessment with patient to assess their needs and identify their goals to complete while in the recovery program as well as once discharged into the community  Patient will complete WRAP Plan, Crisis Plan and 5 Life Domains  Patient will attend 50% of groups offered on the unit  Patient will complete a goal card weekly      Outcome: Not Progressing  Goal: Recognize dysfunctional thoughts, communicate reality-based thoughts at the time of discharge  Description  Interventions:  - Provide medication and psycho-education to assist patient in compliance and developing insight into his/her illness   Outcome: Not Progressing  Goal: Complete daily ADLs, including personal hygiene independently, as able  Description  Interventions:  - Observe, teach, and assist patient with ADLS  - Monitor and promote a balance of rest/activity, with adequate nutrition and elimination   Outcome: Not Progressing     Problem: Ineffective Coping  Goal: Demonstrates healthy coping skills  Outcome: Not Progressing  Goal: Participates in unit activities  Description  Interventions:  - Provide therapeutic environment   - Provide required programming   - Redirect inappropriate behaviors   Outcome: Not Progressing     Pt more visible on unit this shift  Not interacting too much with peers, but sitting near phone for much of shift  Compliant with meds, other than singulair which pt continues to refuse  Pt very attention seeking and grandiose  Blames others for everything and infers herself as the victim always  Pt often looks for others to feed into her attention seeking behavior by asking how they are  When the question is reciprocated, pt responds with "[facility]", wanting people to ask her to elaborate on why she is just "ok"  Pt continues to refuse to shower  Not as somatic this shift  Ate 20% of dinner, had snack without issues  Will continue to monitor

## 2019-12-01 NOTE — PLAN OF CARE
Problem: PAIN - ADULT  Goal: Verbalizes/displays adequate comfort level or baseline comfort level  Description  Interventions:  - Encourage patient to monitor pain and request assistance  - Assess pain using appropriate pain scale  - Administer analgesics based on type and severity of pain and evaluate response  - Implement non-pharmacological measures as appropriate and evaluate response  - Consider cultural and social influences on pain and pain management  - Notify physician/advanced practitioner if interventions unsuccessful or patient reports new pain  Outcome: Progressing     Problem: SAFETY ADULT  Goal: Patient will remain free of falls  Description  INTERVENTIONS:  - Assess patient frequently for physical needs  -  Identify cognitive and physical deficits and behaviors that affect risk of falls    -  [place] fall precautions as indicated by assessment   - Educate patient/family on patient safety including physical limitations  - Instruct patient to call for assistance with activity based on assessment  - Modify environment to reduce risk of injury  - Consider OT/PT consult to assist with strengthening/mobility  Outcome: Progressing     Problem: RESPIRATORY - ADULT  Goal: Achieves optimal ventilation and oxygenation  Description  INTERVENTIONS:  - Assess for changes in respiratory status  - Assess for changes in mentation and behavior  - Position to facilitate oxygenation and minimize respiratory effort  - Oxygen administration by appropriate delivery method based on oxygen saturation (per order) or ABGs  - Initiate smoking cessation education as indicated  - Encourage broncho-pulmonary hygiene including cough, deep breathe, Incentive Spirometry  - Assess the need for suctioning and aspirate as needed  - Assess and instruct to report SOB or any respiratory difficulty  - Respiratory Therapy support as indicated  Outcome: Progressing     Problem: SLEEP DISTURBANCE  Goal: Will exhibit normal sleeping pattern  Description  Interventions:  -  Assess the patients sleep pattern, noting recent changes  - Administer medication as ordered  - Decrease environmental stimuli, including noise, as appropriate during the night  - Encourage the patient to actively participate in unit groups and or exercise during the day to enhance ability to achieve adequate sleep at night  - Assess the patient, in the morning, encouraging a description of sleep experience  Outcome: Progressing    Naila Ends in bed with eyes closed, breath even and unlabored   On O2 with humidifier @1L/m via nasal cannula   Q 15 minutes rounding   Maintained on fall and SAFE precaution   No somatic complaint overnight   No respiratory distress   Will continue to monitor

## 2019-12-01 NOTE — PLAN OF CARE
Problem: Alteration in Thoughts and Perception  Goal: Treatment Goal: Gain control of psychotic behaviors/thinking, reduce/eliminate presenting symptoms and demonstrate improved reality functioning upon discharge  Outcome: Not Progressing  Goal: Verbalize thoughts and feelings  Description  Interventions:  - Promote a nonjudgmental and trusting relationship with the patient through active listening and therapeutic communication  - Assess patient's level of functioning, behavior and potential for risk  - Engage patient in 1 on 1 interactions for a minimum of 15 minutes each session  - Encourage patient to express fears, feelings, frustrations, and discuss symptoms    - [place] patient to reality, help patient recognize reality-based thinking   - Administer medications as ordered and assess for potential side effects  - Provide the patient education related to the signs and symptoms of the illness and desired effects of prescribed medications  Outcome: Not Progressing  Goal: Agree to be compliant with medication regime, as prescribed and report medication side effects  Description  Interventions:  - Offer appropriate PRN medication and supervise ingestion; conduct aims, as needed   Outcome: Not Progressing  Goal: Attend and participate in unit activities, including therapeutic, recreational, and educational groups  Description  Interventions:  - Provide therapeutic and educational activities daily, encourage attendance and participation, and document same in the medical record     CERTIFIED PEER SPECIALIST INTERVENTIONS:    Complete peer assessment with patient to assess their needs and identify their goals to complete while in the recovery program as well as once discharged into the community  Patient will complete WRAP Plan, Crisis Plan and 5 Life Domains  Patient will attend 50% of groups offered on the unit  Patient will complete a goal card weekly      Outcome: Not Progressing  Goal: Recognize dysfunctional thoughts, communicate reality-based thoughts at the time of discharge  Description  Interventions:  - Provide medication and psycho-education to assist patient in compliance and developing insight into his/her illness   Outcome: Not Progressing  Goal: Complete daily ADLs, including personal hygiene independently, as able  Description  Interventions:  - Observe, teach, and assist patient with ADLS  - Monitor and promote a balance of rest/activity, with adequate nutrition and elimination   Outcome: Not Progressing     Problem: Ineffective Coping  Goal: Identifies ineffective coping skills  Outcome: Not Progressing  Goal: Identifies healthy coping skills  Outcome: Not Progressing  Goal: Demonstrates healthy coping skills  Outcome: Not Progressing  Goal: Participates in unit activities  Description  Interventions:  - Provide therapeutic environment   - Provide required programming   - Redirect inappropriate behaviors   Outcome: Not Progressing  Goal: Patient/Family participate in treatment and DC plans  Description  Interventions:  - Provide therapeutic environment  Outcome: Not Progressing  Goal: Patient/Family verbalizes awareness of resources  Outcome: Not Progressing  Goal: Understands least restrictive measures  Description  Interventions:  - Utilize least restrictive behavior  Outcome: Not Progressing     Problem: Risk for Self Injury/Neglect  Goal: Treatment Goal: Remain safe during length of stay, learn and adopt new coping skills, and be free of self-injurious ideation, impulses and acts at the time of discharge  Outcome: Not Progressing  Goal: Verbalize thoughts and feelings  Description  Interventions:  - Assess and re-assess patient's lethality and potential for self-injury  - Engage patient in 1:1 interactions, daily, for a minimum of 15 minutes  - Encourage patient to express feelings, fears, frustrations, hopes  - Establish rapport/trust with patient   Outcome: Not Progressing  Goal: Refrain from harming self  Description  Interventions:  - Monitor patient closely, per order  - Develop a trusting relationship  - Supervise medication ingestion, monitor effects and side effects   Outcome: Not Progressing  Goal: Attend and participate in unit activities, including therapeutic, recreational, and educational groups  Description  Interventions:  - Provide therapeutic and educational activities daily, encourage attendance and participation, and document same in the medical record  - Obtain collateral information, encourage visitation and family involvement in care   Outcome: Not Progressing  Goal: Recognize maladaptive responses and adopt new coping mechanisms  Outcome: Not Progressing  Goal: Complete daily ADLs, including personal hygiene independently, as able  Description  Interventions:  - Observe, teach, and assist patient with ADLS  - Monitor and promote a balance of rest/activity, with adequate nutrition and elimination  Outcome: Not Progressing     Problem: Depression  Goal: Treatment Goal: Demonstrate behavioral control of depressive symptoms, verbalize feelings of improved mood/affect, and adopt new coping skills prior to discharge  Outcome: Not Progressing  Goal: Verbalize thoughts and feelings  Description  Interventions:  - Assess and re-assess patient's level of risk   - Engage patient in 1:1 interactions, daily, for a minimum of 15 minutes   - Encourage patient to express feelings, fears, frustrations, hopes   Outcome: Not Progressing  Goal: Refrain from isolation  Description  Interventions:  - Develop a trusting relationship   - Encourage socialization   Outcome: Not Progressing  Goal: Refrain from self-neglect  Outcome: Not Progressing     Problem: Anxiety  Goal: Anxiety is at manageable level  Description  Interventions:  - Assess and monitor patient's anxiety level     - Monitor for signs and symptoms of anxiety both physical and emotional (heart palpitations, chest pain, shortness of breath, headaches, nausea, feeling jumpy, restlessness, irritable, apprehensive)  - Collaborate with interdisciplinary team and initiate plan and interventions as ordered  - [place] patient to unit/surroundings  - Explain treatment plan  - Encourage participation in care  - Encourage verbalization of concerns/fears  - Identify coping mechanisms  - Assist in developing anxiety-reducing skills  - Administer/offer alternative therapies  - Limit or eliminate stimulants  Outcome: Not Progressing     Problem: Alteration in Orientation  Goal: Interact with staff daily  Description  Interventions:  - Assess and re-assess patient's level of orientation  - Engage patient in 1 on 1 interactions, daily, for a minimum of 15 minutes   - Establish rapport/trust with patient   Outcome: Not Progressing  Goal: Cooperate with recommended testing/procedures  Description  Interventions:  - Determine need for ancillary testing  - Observe for mental status changes  - Implement falls/precaution protocol   Outcome: Not Progressing     Problem: PAIN - ADULT  Goal: Verbalizes/displays adequate comfort level or baseline comfort level  Description  Interventions:  - Encourage patient to monitor pain and request assistance  - Assess pain using appropriate pain scale  - Administer analgesics based on type and severity of pain and evaluate response  - Implement non-pharmacological measures as appropriate and evaluate response  - Consider cultural and social influences on pain and pain management  - Notify physician/advanced practitioner if interventions unsuccessful or patient reports new pain  Outcome: Not Progressing     Problem: SAFETY ADULT  Goal: Patient will remain free of falls  Description  INTERVENTIONS:  - Assess patient frequently for physical needs  -  Identify cognitive and physical deficits and behaviors that affect risk of falls    -  [place] fall precautions as indicated by assessment   - Educate patient/family on patient safety including physical limitations  - Instruct patient to call for assistance with activity based on assessment  - Modify environment to reduce risk of injury  - Consider OT/PT consult to assist with strengthening/mobility  Outcome: Not Progressing     Problem: RESPIRATORY - ADULT  Goal: Achieves optimal ventilation and oxygenation  Description  INTERVENTIONS:  - Assess for changes in respiratory status  - Assess for changes in mentation and behavior  - Position to facilitate oxygenation and minimize respiratory effort  - Oxygen administration by appropriate delivery method based on oxygen saturation (per order) or ABGs  - Initiate smoking cessation education as indicated  - Encourage broncho-pulmonary hygiene including cough, deep breathe, Incentive Spirometry  - Assess the need for suctioning and aspirate as needed  - Assess and instruct to report SOB or any respiratory difficulty  - Respiratory Therapy support as indicated  Outcome: Not Progressing     Problem: DISCHARGE PLANNING  Goal: Discharge to home or other facility with appropriate resources  Description  INTERVENTIONS:  - Conduct assessment to determine patient/family and health care team treatment goals, and need for post-acute services based on payer coverage, community resources, and patient preferences, and barriers to discharge  - Address psychosocial, clinical, and financial barriers to discharge as identified in assessment in conjunction with the patient/family and health care team  - Assist the patient in reintegration back into the community by removing barriers which may hinder a successful discharge once deemed stable  - Arrange appropriate level of post-acute services according to patient's needs and preference and payer coverage in collaboration with the physician and health care team  - Communicate with and update the patient/family, physician, and health care team regarding progress on the discharge plan  - Arrange appropriate transportation to post-acute venues    Outcome: Not Progressing     Problem: SLEEP DISTURBANCE  Goal: Will exhibit normal sleeping pattern  Description  Interventions:  -  Assess the patients sleep pattern, noting recent changes  - Administer medication as ordered  - Decrease environmental stimuli, including noise, as appropriate during the night  - Encourage the patient to actively participate in unit groups and or exercise during the day to enhance ability to achieve adequate sleep at night  - Assess the patient, in the morning, encouraging a description of sleep experience  Outcome: Not Progressing     Problem: Nutrition/Hydration-ADULT  Goal: Nutrient/Hydration intake appropriate for improving, restoring or maintaining nutritional needs  Description  Monitor and assess patient's nutrition/hydration status for malnutrition  Collaborate with interdisciplinary team and initiate plan and interventions as ordered  Monitor patient's weight and dietary intake as ordered or per policy  Utilize nutrition screening tool and intervene as necessary  Determine patient's food preferences and provide high-protein, high-caloric foods as appropriate  INTERVENTIONS:  - Monitor oral intake, urinary output, labs, and treatment plans  - Assess nutrition and hydration status and recommend course of action  - Evaluate amount of meals eaten  - Assist patient with eating if necessary   - Allow adequate time for meals  - Recommend/ encourage appropriate diets, oral nutritional supplements, and vitamin/mineral supplements  - Order, calculate, and assess calorie counts as needed  - Recommend, monitor, and adjust tube feedings and TPN/PPN based on assessed needs  - Assess need for intravenous fluids  - Provide specific nutrition/hydration education as appropriate  - Include patient/family/caregiver in decisions related to nutrition  Outcome: Not Progressing    Patient in bed, withdrawn and isolative to self  Refused vitals  Disheveled    Out of bed for meals and snack only  No groups  No hygiene  No prompting needed for meds today  Reports depression (5) and anxiety (5) today  No somatic complaints  No insight into program requirements and compliance  Ate 25% of breakfast and no lunch  Requests staff to sit with her while eating   Again, staff unable to accommodate that request  Will continue to monitor progress in recovery program

## 2019-12-01 NOTE — PROGRESS NOTES
Psychiatry Progress Note    Subjective: Interval History     The patient continues to be withdrawn and is lying in bed this morning  Staff has encouraged her to get out of bed however patient refuses  Pt makes multiple excuses as to why she cannot do things  She has only been showering once a week and refuses to shower more frequently  She continues to be somatic and attention seeking  She has been medication compliant  Her appetite varies  She denies suicidal ideations      Behavior over the last 24 hours:  unchanged  Sleep: normal  Appetite: normal  Medication side effects: No  ROS: no complaints    Current medications:    Current Facility-Administered Medications:     acetaminophen (TYLENOL) tablet 650 mg, 650 mg, Oral, Q6H PRN, Shi Pham MD    albuterol (PROVENTIL HFA,VENTOLIN HFA) inhaler 2 puff, 2 puff, Inhalation, Q4H PRN, Shi Pham MD, 2 puff at 10/11/19 0424    aluminum-magnesium hydroxide-simethicone (MYLANTA) 200-200-20 mg/5 mL oral suspension 15 mL, 15 mL, Oral, Q4H PRN, Shi Pham MD    ammonium lactate (LAC-HYDRIN) 12 % lotion 1 application, 1 application, Topical, BID PRN, Shi Pham MD    benzonatate (TESSALON PERLES) capsule 100 mg, 100 mg, Oral, TID PRN, Shi Pham MD    benztropine (COGENTIN) injection 1 mg, 1 mg, Intramuscular, Q8H PRN, Shi Pham MD    cloZAPine (CLOZARIL) tablet 25 mg, 25 mg, Oral, BID, Shi Pham MD, 25 mg at 11/30/19 2120    cloZAPine (CLOZARIL) tablet 50 mg, 50 mg, Oral, BID, Shi Pham MD, 50 mg at 11/30/19 2120    EPINEPHrine PF (ADRENALIN) 1 mg/mL injection 0 15 mg, 0 15 mg, Intramuscular, Once PRN, Shi Pham MD    fluticasone-vilanterol (BREO ELLIPTA) 200-25 MCG/INH inhaler 1 puff, 1 puff, Inhalation, Daily, Shi Pham MD, 1 puff at 12/01/19 0846    ketotifen (ZADITOR) 0 025 % ophthalmic solution 1 drop, 1 drop, Right Eye, BID PRN, Shi Pham MD    levothyroxine tablet 125 mcg, 125 mcg, Oral, Early Morning, Shi Pham MD, 125 mcg at 12/01/19 0550    magnesium hydroxide (MILK OF MAGNESIA) 400 mg/5 mL oral suspension 30 mL, 30 mL, Oral, Daily PRN, Chichi Lockett MD    montelukast (SINGULAIR) tablet 10 mg, 10 mg, Oral, HS, Chichi Lockett MD, 10 mg at 11/12/19 2141    OLANZapine (ZyPREXA) IM injection 5 mg, 5 mg, Intramuscular, Q8H PRN, Chichi Lockett MD    OLANZapine (ZyPREXA) tablet 5 mg, 5 mg, Oral, Q8H PRN, Chichi Lockett MD    ondansetron (ZOFRAN-ODT) dispersible tablet 4 mg, 4 mg, Oral, Q6H PRN, Chichi Lockett MD, 4 mg at 11/09/19 1223    pantoprazole (PROTONIX) EC tablet 40 mg, 40 mg, Oral, Early Morning, Chichi Lockett MD, 40 mg at 12/01/19 0550    polyethylene glycol (MIRALAX) packet 17 g, 17 g, Oral, Daily PRN, Chichi Lockett MD    polyvinyl alcohol (LIQUIFILM TEARS) 1 4 % ophthalmic solution 1 drop, 1 drop, Both Eyes, Q3H PRN, Chichi Lockett MD    sertraline (ZOLOFT) tablet 50 mg, 50 mg, Oral, BID, Chichi Lockett MD, 50 mg at 12/01/19 0845    sucralfate (CARAFATE) oral suspension 1,000 mg, 1,000 mg, Oral, BID, Beryl Cruz MD, 1,000 mg at 12/01/19 0609    theophylline (JEF-24) 24 hr capsule 200 mg, 200 mg, Oral, Daily, Chichi Lockett MD, 200 mg at 12/01/19 0845    tiotropium [facility]) capsule for inhaler 18 mcg, 18 mcg, Inhalation, Daily, Chichi Lockett MD, 18 mcg at 12/01/19 0845    traZODone (DESYREL) tablet 25 mg, 25 mg, Oral, HS PRN, Chichi Lockett MD    Current Problem List:    Patient Active Problem List   Diagnosis    Sepsis ([facility]    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[facility]    Tobacco use disorder, continuous    Bipolar disorder ([facility]    Left hip pain    Lactic acidosis    Hypokalemia    Hypomagnesemia    Compression fracture of L4 lumbar vertebra    Thoracic compression fracture ([address]    Ventral hernia    Parapneumonic effusion    Acute on chronic respiratory failure with hypoxia ([facility]    Chronic respiratory failure ([address]    Hypophosphatemia    Elevated MCV    Schizoaffective disorder, bipolar type ([address] )    Acquired hypothyroidism    Gastroesophageal reflux disease without esophagitis    Abnormal CT of the chest    Excessive cerumen in left ear canal    Lipoma of right upper extremity    Polydipsia    Localized swelling of both lower legs    Noncompliant with deep vein thrombosis (DVT) prophylaxis    Allergic conjunctivitis of right eye    At risk for aspiration       Problem list reviewed 12/01/19     Objective:     Vital Signs:  Vitals:    11/30/19 0730 11/30/19 1500 11/30/19 2052 12/01/19 0730   BP: 117/76 102/76 110/70    BP Location: Right arm Left arm Left arm    Pulse: 92 82 80    Resp: 18 16 14    Temp: 97 6 °F (36 4 °C) (!) 97 4 °F (36 3 °C) (!) 97 °F (36 1 °C)    TempSrc:  Temporal Temporal    SpO2:  95% 94%    Weight:    66 kg (145 lb 6 4 oz)   Height:             Appearance:  age appropriate and casually dressed   Behavior:  guarded   Speech:  normal volume   Mood:  anxious   Affect:  mood-congruent   Thought Process:  goal directed   Thought Content:  delusions  persecutory   Perceptual Disturbances: None   Risk Potential: none   Sensorium:  person, place, situation and time   Cognition:  intact   Consciousness:  alert and awake    Attention: attention span and concentration were age appropriate   Intellect: average   Insight:  poor   Judgment: poor      Motor Activity: no abnormal movements       I/O Past 24 hours:  No intake/output data recorded  No intake/output data recorded  Labs:  Reviewed 12/01/19    Assessment / Plan:     Schizoaffective disorder, bipolar type ([facility]    Recommended Treatment:      Medication changes:  1) continue current medication regimen  Non-pharmacological treatments  1) Continue with group therapy, milieu therapy and occupational therapy  Safety  1) Safety/communication plan established targeting dynamic risk factors above  2) Risks, benefits, and possible side effects of medications explained to patient and patient verbalizes understanding        Counseling / Coordination of Care    Total floor / unit time spent today 20 minutes  Greater than 50% of total time was spent with the patient and / or family counseling and / or coordination of care  A description of the counseling / coordination of care  Patient's Rights, confidentiality and exceptions to confidentiality, use of automated medical record, [facility] staff access to medical record, and consent to treatment reviewed      Bisi Goddard PA-C

## 2019-12-02 NOTE — PLAN OF CARE
Problem: Alteration in Thoughts and Perception  Goal: Attend and participate in unit activities, including therapeutic, recreational, and educational groups  Description  Interventions:  - Provide therapeutic and educational activities daily, encourage attendance and participation, and document same in the medical record     CERTIFIED PEER SPECIALIST INTERVENTIONS:    Complete peer assessment with patient to assess their needs and identify their goals to complete while in the recovery program as well as once discharged into the community  Patient will complete WRAP Plan, Crisis Plan and 5 Life Domains  Patient will attend 50% of groups offered on the unit  Patient will complete a goal card weekly  Outcome: Not Progressing  Patient not attending groups and has shown no progress in attendance  Patient at 12% group attendance and attended 0 IMR groups out of 5 offered

## 2019-12-02 NOTE — PLAN OF CARE
Problem: Alteration in Thoughts and Perception  Goal: Agree to be compliant with medication regime, as prescribed and report medication side effects  Description  Interventions:  - Offer appropriate PRN medication and supervise ingestion; conduct aims, as needed   Outcome: Progressing     Problem: Alteration in Thoughts and Perception  Goal: Attend and participate in unit activities, including therapeutic, recreational, and educational groups  Description  Interventions:  - Provide therapeutic and educational activities daily, encourage attendance and participation, and document same in the medical record     CERTIFIED PEER SPECIALIST INTERVENTIONS:    Complete peer assessment with patient to assess their needs and identify their goals to complete while in the recovery program as well as once discharged into the community  Patient will complete WRAP Plan, Crisis Plan and 5 Life Domains  Patient will attend 50% of groups offered on the unit  Patient will complete a goal card weekly  Outcome: Not Progressing  Goal: Recognize dysfunctional thoughts, communicate reality-based thoughts at the time of discharge  Description  Interventions:  - Provide medication and psycho-education to assist patient in compliance and developing insight into his/her illness   Outcome: Not Progressing  Goal: Complete daily ADLs, including personal hygiene independently, as able  Description  Interventions:  - Observe, teach, and assist patient with ADLS  - Monitor and promote a balance of rest/activity, with adequate nutrition and elimination   Outcome: Not Progressing   Patient continues to be isolative and withdrawn  The only time she interacts with peers or staff is when she is complaining about that she does not feel well  She continues to be very somatic at times, stating "I cant swollow, Im choking"  When confronted that these are fears and delusional thoughts she does admit this    She has been in bed all day except to eat her meals  No group attendance noted  Continue to monitor

## 2019-12-02 NOTE — PROGRESS NOTES
12/02/19 1100   Activity/Group Checklist   Group   (IMR/6 Pillars of Self Esteem )   Attendance Did not attend   Attendance Duration (min) 46-60   Affect/Mood IRMA

## 2019-12-02 NOTE — PROGRESS NOTES
Progress Note - Erika Titus 1955, 60 y o  female MRN: [id number]    [facility] Encounter: [id number]    Primary Care Provider: Sheron Rangel PA-C   Date and time admitted to hospital: 7/23/2019  5:30 PM        * Schizoaffective disorder, bipolar type [facility])  Assessment & Plan  Psychiatry Progress Note  Patient has not taken a shower in the last 5 days claiming she will it take showers once a week which is better than every 2 weeks  However she is still somatically preoccupied claiming that she cannot swallow or has low blood sugar and has been demanding to check her sugar and days now act is a tree to certain staff claiming that the talk about her aunt on like her  She is also attention seeking stating the or that she only feels just okay  She did enjoy a Thanksgiving meal that her sister had brought in on Friday evening  He is still choosing not to go to groups claiming that she cannot eat or drink or go out of her room despite her being able to go to the dining room and cope for medications  She was again told that the expectation is that she should take showers twice a week at least or at the least once a week   No signs or sxs of agranulocytosis or myocarditis or endocarditis and she is moving her bowels so far with no issues   She was again reminded to work with the program and work towards going back to the [facility] and start attending groups at least 25% and to  attend to her ADLs skills like continuing to take showers and changing clothes as well before we can look into discharging her back to the personal care home but she remains passive-aggressive and refuses to do anything to help herself     Has been compliant with medications including the recent increase in Zoloft so for and is again accusatory to staff and paranoid and demanding and psychosomatic as usual but was polite and friendly and thought that the medical resident who came with me to see her 2 days ago is her boyfriend!     Current medications:    Current Facility-Administered Medications:     acetaminophen (TYLENOL) tablet 650 mg, 650 mg, Oral, Q6H PRN, Jaz Beasley MD    albuterol (PROVENTIL HFA,VENTOLIN HFA) inhaler 2 puff, 2 puff, Inhalation, Q4H PRN, Jaz Beasley MD, 2 puff at 10/11/19 0424    aluminum-magnesium hydroxide-simethicone (MYLANTA) 200-200-20 mg/5 mL oral suspension 15 mL, 15 mL, Oral, Q4H PRN, Jaz Beasley MD    ammonium lactate (LAC-HYDRIN) 12 % lotion 1 application, 1 application, Topical, BID PRN, Jaz Beasley MD    benzonatate (TESSALON PERLES) capsule 100 mg, 100 mg, Oral, TID PRN, Jaz Beasley MD    benztropine (COGENTIN) injection 1 mg, 1 mg, Intramuscular, Q8H PRN, Jaz Beasley MD    cloZAPine (CLOZARIL) tablet 25 mg, 25 mg, Oral, BID, Jaz Beasley MD, 25 mg at 12/01/19 2011    cloZAPine (CLOZARIL) tablet 50 mg, 50 mg, Oral, BID, Jaz Beasley MD, 50 mg at 12/01/19 2011    EPINEPHrine PF (ADRENALIN) 1 mg/mL injection 0 15 mg, 0 15 mg, Intramuscular, Once PRN, Jaz Beasley MD    fluticasone-vilanterol (BREO ELLIPTA) 200-25 MCG/INH inhaler 1 puff, 1 puff, Inhalation, Daily, Jaz Beasley MD, 1 puff at 12/01/19 0846    ketotifen (ZADITOR) 0 025 % ophthalmic solution 1 drop, 1 drop, Right Eye, BID PRN, Jaz Beasley MD    levothyroxine tablet 125 mcg, 125 mcg, Oral, Early Morning, Jaz Beasley MD, 125 mcg at 12/02/19 0550    magnesium hydroxide (MILK OF MAGNESIA) 400 mg/5 mL oral suspension 30 mL, 30 mL, Oral, Daily PRN, Jaz Beasley MD    montelukast (SINGULAIR) tablet 10 mg, 10 mg, Oral, HS, Jaz Beasley MD, 10 mg at 11/12/19 2141    OLANZapine (ZyPREXA) IM injection 5 mg, 5 mg, Intramuscular, Q8H PRN, Jaz Beasley MD    OLANZapine (ZyPREXA) tablet 5 mg, 5 mg, Oral, Q8H PRN, Jaz Beasley MD    ondansetron (ZOFRAN-ODT) dispersible tablet 4 mg, 4 mg, Oral, Q6H PRN, Jaz Beasley MD, 4 mg at 11/09/19 1223    pantoprazole (PROTONIX) EC tablet 40 mg, 40 mg, Oral, Early Morning, Christian Bennett MD, 40 mg at 12/02/19 0550    polyethylene glycol (MIRALAX) packet 17 g, 17 g, Oral, Daily PRN, Christian Bennett MD    polyvinyl alcohol (LIQUIFILM TEARS) 1 4 % ophthalmic solution 1 drop, 1 drop, Both Eyes, Q3H PRN, Christian Bennett MD    sertraline (ZOLOFT) tablet 50 mg, 50 mg, Oral, BID, Christian Bennett MD, 50 mg at 12/01/19 2011    sucralfate (CARAFATE) oral suspension 1,000 mg, 1,000 mg, Oral, BID, Sera Roth MD, 1,000 mg at 12/02/19 5285    theophylline (JEF-24) 24 hr capsule 200 mg, 200 mg, Oral, Daily, Christian Bennett MD, 200 mg at 12/01/19 0845    tiotropium [facility]) capsule for inhaler 18 mcg, 18 mcg, Inhalation, Daily, Chirstian Bennett MD, 18 mcg at 12/01/19 0845    traZODone (DESYREL) tablet 25 mg, 25 mg, Oral, HS PRN, Christian Bennett MD  Justification if on more than two antipsychotics:  Only on his clozapine  Side effects if any:  None    Risks , benefits, side effects and precautions of medications discussed with patient and reminded patient to let us know any problems with medications     Objective:     Vital Signs:  Vitals:    11/30/19 2052 12/01/19 0730 12/01/19 1600 12/01/19 1948   BP: 110/70   140/76   BP Location: Left arm   Left arm   Pulse: 80  91 97   Resp: 14  16 16   Temp: (!) 97 °F (36 1 °C)  (!) 97 2 °F (36 2 °C) (!) 97 3 °F (36 3 °C)   TempSrc: Temporal  Temporal Temporal   SpO2: 94%  97% 92%   Weight:  66 kg (145 lb 6 4 oz)     Height:         Appearance:  age appropriate, casually dressed and overweight older than stated age, wearing clean clothing friendly and cooperative laying in bed but disheveled and unkept   Behavior:  evasive and guarded and suspicious and preoccupied with psychosomatic complaints   Speech:  normal pitch and normal volume but circumstantial and tangential with stilted speech off and on   Mood:  anxious and dysthymic   Affect:  mood-congruent, elated and entitled anxious and irritated and sad at times but pleasant today when approached   Thought Process:  goal directed and illogical slightly pressured and tangential and talks as if in a court of law   Thought Content:  Delusional  believing she cannot breathe or cannot swallow or having low blood sugar demanding extra juice or having cancer and dying from it which are all fixated psychosomatic delusions     No current suicidal homicidal thoughts intent or plans reported  No phobias obsessions or compulsions reported  Also accuses staff of tampering with her medications and her spirometer and oxygen concentrator daughter also believes that some of the staff do not like her  Still delusional about the medical resident being her boyfriend and then laughs about it  Perceptual Disturbances: None and does not appear responding to internal stimuli at times   Risk Potential: Tendency to not care for herself    Sensorium:  person, place, time/date, situation, day of week, month of year and time   Cognition:  grossly intact   Consciousness:  alert and awake    Attention: Intact concentration and attention span   Intellect: Considered to be at least of average intelligence   Insight:  limited and in denial of her illness   Judgment: poor      Motor Activity: no abnormal movements         Recent Labs:  Results Reviewed     None          I/O Past 24 hours:  No intake/output data recorded  No intake/output data recorded          Assessment / Plan:     [facility]      Reason for continued inpatient care:  Because of underlying paranoia and refusal to go back to the personal care home and inability to care for herself on her own  Acceptance by patient:  Accepting  Audelia Arthur in recovery:  About living in another personal care home once she feels better  Understanding of medications :  Has some understanding  Involved in reintegration process:  Adjusting to the unit  Trusting in relatoinship with psychiatrist:  Trusting    Recommended Treatment:    Medication changes:  1) none today  Non-pharmacological treatments  1) continue with  individual therapy group therapy, milieu therapy and occupational therapy and milieu therapy involving multidisciplinary team approach with psychotherapist, case management, nursing, peer support services, art therapist etc using recovery principles and psycho-education about accepting illness and need for treatment   2) encourage to cocoperate with behavior plan  3) encourage to attend to ADLs like taking showers and wearing clean clothes   4) Encourage to attend groups   Safety  1) Safety/communication plan established targeting dynamic risk factors above  Discharge Plan most likely back to the a:  Personal care boarding home with act services once her delusions are managed and mood becomes more stabilized and she becomes more receptive to treatment compliance    Counseling / Coordination of Care    Total floor / unit time spent today 15 minutes  Greater than 50% of total time was spent with the patient and / or family counseling and / or coordination of care  A description of the counseling / coordination of care  Patient's Rights, confidentiality and exceptions to confidentiality, use of automated medical record, [employer] staff access to medical record, and consent to treatment reviewed      Isidoro Gonzalez MD

## 2019-12-02 NOTE — PLAN OF CARE
Problem: PAIN - ADULT  Goal: Verbalizes/displays adequate comfort level or baseline comfort level  Description  Interventions:  - Encourage patient to monitor pain and request assistance  - Assess pain using appropriate pain scale  - Administer analgesics based on type and severity of pain and evaluate response  - Implement non-pharmacological measures as appropriate and evaluate response  - Consider cultural and social influences on pain and pain management  - Notify physician/advanced practitioner if interventions unsuccessful or patient reports new pain  Outcome: Progressing     Problem: SAFETY ADULT  Goal: Patient will remain free of falls  Description  INTERVENTIONS:  - Assess patient frequently for physical needs  -  Identify cognitive and physical deficits and behaviors that affect risk of falls    -  [place] fall precautions as indicated by assessment   - Educate patient/family on patient safety including physical limitations  - Instruct patient to call for assistance with activity based on assessment  - Modify environment to reduce risk of injury  - Consider OT/PT consult to assist with strengthening/mobility  Outcome: Progressing     Problem: RESPIRATORY - ADULT  Goal: Achieves optimal ventilation and oxygenation  Description  INTERVENTIONS:  - Assess for changes in respiratory status  - Assess for changes in mentation and behavior  - Position to facilitate oxygenation and minimize respiratory effort  - Oxygen administration by appropriate delivery method based on oxygen saturation (per order) or ABGs  - Initiate smoking cessation education as indicated  - Encourage broncho-pulmonary hygiene including cough, deep breathe, Incentive Spirometry  - Assess the need for suctioning and aspirate as needed  - Assess and instruct to report SOB or any respiratory difficulty  - Respiratory Therapy support as indicated  Outcome: Progressing     Problem: SLEEP DISTURBANCE  Goal: Will exhibit normal sleeping pattern  Description  Interventions:  -  Assess the patients sleep pattern, noting recent changes  - Administer medication as ordered  - Decrease environmental stimuli, including noise, as appropriate during the night  - Encourage the patient to actively participate in unit groups and or exercise during the day to enhance ability to achieve adequate sleep at night  - Assess the patient, in the morning, encouraging a description of sleep experience  Outcome: Progressing    Miquel Betters in bed with eyes closed, breath even and unlabored   On O2 with humidifier @1L/m via nasal cannula   Q 15 minutes rounding   Maintained on fall and SAFE precaution   No somatic complaint overnight   No respiratory distress   Will continue to monitor

## 2019-12-02 NOTE — PROGRESS NOTES
Pt received @ 1500  Pleasant,cooperative somatic  Ate 50% of dinner & requested her blood sugar taken  States she is not eating enough protein  Informed pt she was not having any s&s to warrant an accu check & encouraged pt to eat meals  Compliant w/most meds but refused singulair  Did not attend evening group   No behavioral issues, will continue to monitor

## 2019-12-02 NOTE — PROGRESS NOTES
12/02/19 0900   Team Meeting   Meeting Type Daily Rounds   Team Members Present   Team Members Present Physician;Nurse;; Other (Discipline and Name)   Physician Team Member Dr Beth Kang, RN   Care Management Team Member Brenda Rodriguez   Other (Discipline and Name) Julia Duran LCSW     No changes in behaviors  Slept

## 2019-12-02 NOTE — ASSESSMENT & PLAN NOTE
Psychiatry Progress Note  Patient has not taken a shower in the last 5 days claiming she will it take showers once a week which is better than every 2 weeks  However she is still somatically preoccupied claiming that she cannot swallow or has low blood sugar and has been demanding to check her sugar and days now act is a tree to certain staff claiming that the talk about her aunt on like her  She is also attention seeking stating the or that she only feels just okay  She did enjoy a Thanksgiving meal that her sister had brought in on Friday evening  He is still choosing not to go to groups claiming that she cannot eat or drink or go out of her room despite her being able to go to the dining room and cope for medications  She was again told that the expectation is that she should take showers twice a week at least or at the least once a week   No signs or sxs of agranulocytosis or myocarditis or endocarditis and she is moving her bowels so far with no issues   She was again reminded to work with the program and work towards going back to the [facility] and start attending groups at least 25% and to  attend to her ADLs skills like continuing to take showers and changing clothes as well before we can look into discharging her back to the personal care home but she remains passive-aggressive and refuses to do anything to help herself   Has been compliant with medications including the recent increase in Zoloft so for and is again accusatory to staff and paranoid and demanding and psychosomatic as usual but was polite and friendly and thought that the medical resident who came with me to see her 2 days ago is her boyfriend!     Current medications:    Current Facility-Administered Medications:     acetaminophen (TYLENOL) tablet 650 mg, 650 mg, Oral, Q6H PRN, Roberto Shankar MD    albuterol (PROVENTIL HFA,VENTOLIN HFA) inhaler 2 puff, 2 puff, Inhalation, Q4H PRN, Roberto Shankar MD, 2 puff at 10/11/19 0424    aluminum-magnesium hydroxide-simethicone (MYLANTA) 200-200-20 mg/5 mL oral suspension 15 mL, 15 mL, Oral, Q4H PRN, Meldon Curling, MD    ammonium lactate (LAC-HYDRIN) 12 % lotion 1 application, 1 application, Topical, BID PRN, Meldon Curling, MD    benzonatate (TESSALON PERLES) capsule 100 mg, 100 mg, Oral, TID PRN, Meldon Curling, MD    benztropine (COGENTIN) injection 1 mg, 1 mg, Intramuscular, Q8H PRN, Meldon Curling, MD    cloZAPine (CLOZARIL) tablet 25 mg, 25 mg, Oral, BID, Meldon Curling, MD, 25 mg at 12/01/19 2011    cloZAPine (CLOZARIL) tablet 50 mg, 50 mg, Oral, BID, Meldon Curling, MD, 50 mg at 12/01/19 2011    EPINEPHrine PF (ADRENALIN) 1 mg/mL injection 0 15 mg, 0 15 mg, Intramuscular, Once PRN, Meldon Curling, MD    fluticasone-vilanterol (BREO ELLIPTA) 200-25 MCG/INH inhaler 1 puff, 1 puff, Inhalation, Daily, Meldon Curling, MD, 1 puff at 12/01/19 0846    ketotifen (ZADITOR) 0 025 % ophthalmic solution 1 drop, 1 drop, Right Eye, BID PRN, Meldon Curling, MD    levothyroxine tablet 125 mcg, 125 mcg, Oral, Early Morning, Meldon Curling, MD, 125 mcg at 12/02/19 0550    magnesium hydroxide (MILK OF MAGNESIA) 400 mg/5 mL oral suspension 30 mL, 30 mL, Oral, Daily PRN, Meldon Curling, MD    montelukast (SINGULAIR) tablet 10 mg, 10 mg, Oral, HS, Meldon Curling, MD, 10 mg at 11/12/19 8481    OLANZapine (ZyPREXA) IM injection 5 mg, 5 mg, Intramuscular, Q8H PRN, Meldon Curling, MD    OLANZapine (ZyPREXA) tablet 5 mg, 5 mg, Oral, Q8H PRN, Meldon Curling, MD    ondansetron (ZOFRAN-ODT) dispersible tablet 4 mg, 4 mg, Oral, Q6H PRN, Meldon Curling, MD, 4 mg at 11/09/19 1223    pantoprazole (PROTONIX) EC tablet 40 mg, 40 mg, Oral, Early Morning, Meldon Curling, MD, 40 mg at 12/02/19 0550    polyethylene glycol (MIRALAX) packet 17 g, 17 g, Oral, Daily PRN, Meldon Curling, MD    polyvinyl alcohol (LIQUIFILM TEARS) 1 4 % ophthalmic solution 1 drop, 1 drop, Both Eyes, Q3H PRN, Meldon Curling, MD    sertraline (ZOLOFT) tablet 50 mg, 50 mg, Oral, BID, Manuel Copeland MD, 50 mg at 12/01/19 2011    sucralfate (CARAFATE) oral suspension 1,000 mg, 1,000 mg, Oral, BID, Ruperto Robles MD, 1,000 mg at 12/02/19 0784    theophylline (JEF-24) 24 hr capsule 200 mg, 200 mg, Oral, Daily, Manuel Copeland MD, 200 mg at 12/01/19 0845    tiotropium [facility]) capsule for inhaler 18 mcg, 18 mcg, Inhalation, Daily, Manuel Copeland MD, 18 mcg at 12/01/19 0845    traZODone (DESYREL) tablet 25 mg, 25 mg, Oral, HS PRN, Manuel Copeland MD  Justification if on more than two antipsychotics:  Only on his clozapine  Side effects if any:  None    Risks , benefits, side effects and precautions of medications discussed with patient and reminded patient to let us know any problems with medications     Objective:     Vital Signs:  Vitals:    11/30/19 2052 12/01/19 0730 12/01/19 1600 12/01/19 1948   BP: 110/70   140/76   BP Location: Left arm   Left arm   Pulse: 80  91 97   Resp: 14  16 16   Temp: (!) 97 °F (36 1 °C)  (!) 97 2 °F (36 2 °C) (!) 97 3 °F (36 3 °C)   TempSrc: Temporal  Temporal Temporal   SpO2: 94%  97% 92%   Weight:  66 kg (145 lb 6 4 oz)     Height:         Appearance:  age appropriate, casually dressed and overweight older than stated age, wearing clean clothing friendly and cooperative laying in bed but disheveled and unkept   Behavior:  evasive and guarded and suspicious and preoccupied with psychosomatic complaints   Speech:  normal pitch and normal volume but circumstantial and tangential with stilted speech off and on   Mood:  anxious and dysthymic   Affect:  mood-congruent, elated and entitled anxious and irritated and sad at times but pleasant today when approached   Thought Process:  goal directed and illogical slightly pressured and tangential and talks as if in a court of law   Thought Content:  Delusional  believing she cannot breathe or cannot swallow or having low blood sugar demanding extra juice or having cancer and dying from it which are all fixated psychosomatic delusions     No current suicidal homicidal thoughts intent or plans reported  No phobias obsessions or compulsions reported  Also accuses staff of tampering with her medications and her spirometer and oxygen concentrator daughter also believes that some of the staff do not like her  Still delusional about the medical resident being her boyfriend and then laughs about it  Perceptual Disturbances: None and does not appear responding to internal stimuli at times   Risk Potential: Tendency to not care for herself    Sensorium:  person, place, time/date, situation, day of week, month of year and time   Cognition:  grossly intact   Consciousness:  alert and awake    Attention: Intact concentration and attention span   Intellect: Considered to be at least of average intelligence   Insight:  limited and in denial of her illness   Judgment: poor      Motor Activity: no abnormal movements         Recent Labs:  Results Reviewed     None          I/O Past 24 hours:  No intake/output data recorded  No intake/output data recorded          Assessment / Plan:     [facility]      Reason for continued inpatient care:  Because of underlying paranoia and refusal to go back to the personal care home and inability to care for herself on her own  Acceptance by patient:  Accepting  Arian Fitzpatrick in recovery:  About living in another personal care home once she feels better  Understanding of medications :  Has some understanding  Involved in reintegration process:  Adjusting to the unit  Trusting in relatoinship with psychiatrist:  Trusting    Recommended Treatment:    Medication changes:  1) none today  Non-pharmacological treatments  1) continue with  individual therapy group therapy, milieu therapy and occupational therapy and milieu therapy involving multidisciplinary team approach with psychotherapist, case management, nursing, peer support services, art therapist etc using recovery principles and psycho-education about accepting illness and need for treatment   2) encourage to cocoperate with behavior plan  3) encourage to attend to ADLs like taking showers and wearing clean clothes   4) Encourage to attend groups   Safety  1) Safety/communication plan established targeting dynamic risk factors above  Discharge Plan most likely back to the a:  Personal care boarding home with act services once her delusions are managed and mood becomes more stabilized and she becomes more receptive to treatment compliance    Counseling / Coordination of Care    Total floor / unit time spent today 15 minutes  Greater than 50% of total time was spent with the patient and / or family counseling and / or coordination of care  A description of the counseling / coordination of care  Patient's Rights, confidentiality and exceptions to confidentiality, use of automated medical record, [facility] staff access to medical record, and consent to treatment reviewed      Jaz Beasley MD

## 2019-12-03 NOTE — PLAN OF CARE
Problem: Alteration in Thoughts and Perception  Goal: Agree to be compliant with medication regime, as prescribed and report medication side effects  Description  Interventions:  - Offer appropriate PRN medication and supervise ingestion; conduct aims, as needed   Outcome: Progressing     Problem: Alteration in Thoughts and Perception  Goal: Attend and participate in unit activities, including therapeutic, recreational, and educational groups  Description  Interventions:  - Provide therapeutic and educational activities daily, encourage attendance and participation, and document same in the medical record     CERTIFIED PEER SPECIALIST INTERVENTIONS:    Complete peer assessment with patient to assess their needs and identify their goals to complete while in the recovery program as well as once discharged into the community  Patient will complete WRAP Plan, Crisis Plan and [facility] Domains  Patient will attend 50% of groups offered on the unit  Patient will complete a goal card weekly  Outcome: Not Progressing  Goal: Complete daily ADLs, including personal hygiene independently, as able  Description  Interventions:  - Observe, teach, and assist patient with ADLS  - Monitor and promote a balance of rest/activity, with adequate nutrition and elimination   Outcome: Not Progressing    Patient continues to be isolative and withdrawn  Remains somatic in regards to her "swallowing issue"  Ate 50% of her breakfast and will only eat when staff in near the dining room  She had a yogurt, all of her drinks and oatmeal   She stays in the dining room approx  30 minutes after meals but then retreats to her room and lays in bed  She has not showered since 11/27/19 so today is day #6 without showering  She was greatly encouraged this morning to complete her hygine and shower however, she continues to refuse  She refused to attend her treatment team meeting this morning and no group attendance noted today    She only at her ice cream and drank her drinks for lunch, which is barely 25% of her meal   This writer did talk to her about this again today  She stated "I just feel like I cant swallow, I know it's not true, but I still feel that way"  Continue to monitor

## 2019-12-03 NOTE — PROGRESS NOTES
[address] did emerge from her room for HS snack & had 1 yogurt, 12oz of milk  Came up for HS medicine; took all but the Singulair  Wearing now her QHS humidified nasal O2 @ 1L for bed

## 2019-12-03 NOTE — PROGRESS NOTES
12/03/19 0800   Team Meeting   Meeting Type Daily Rounds   Team Members Present   Team Members Present Nurse; Other (Discipline and Name)   Nursing Team Member Jewels Snyder RN   Other (Discipline and Name) Cesar Bhatt LCSW     Disheveled, somatic  Slept

## 2019-12-03 NOTE — PLAN OF CARE
Problem: Alteration in Thoughts and Perception  Goal: Verbalize thoughts and feelings  Description  Interventions:  - Promote a nonjudgmental and trusting relationship with the patient through active listening and therapeutic communication  - Assess patient's level of functioning, behavior and potential for risk  - Engage patient in 1 on 1 interactions for a minimum of 15 minutes each session  - Encourage patient to express fears, feelings, frustrations, and discuss symptoms    - [place] patient to reality, help patient recognize reality-based thinking   - Administer medications as ordered and assess for potential side effects  - Provide the patient education related to the signs and symptoms of the illness and desired effects of prescribed medications  12/2/2019 2015 by Irwin Ferris RN  Outcome: Progressing  12/2/2019 2015 by Irwin Ferris RN  Outcome: Progressing  Goal: Agree to be compliant with medication regime, as prescribed and report medication side effects  Description  Interventions:  - Offer appropriate PRN medication and supervise ingestion; conduct aims, as needed   12/2/2019 2015 by Irwin Ferris RN  Outcome: Progressing  12/2/2019 2015 by Irwin Ferris RN  Outcome: Progressing     Problem: Depression  Goal: Refrain from isolation  Description  Interventions:  - Develop a trusting relationship   - Encourage socialization   12/2/2019 2015 by Irwin Ferris RN  Outcome: Progressing  12/2/2019 2015 by Irwin Ferris RN  Outcome: Progressing     Problem: Alteration in Thoughts and Perception  Goal: Attend and participate in unit activities, including therapeutic, recreational, and educational groups  Description  Interventions:  - Provide therapeutic and educational activities daily, encourage attendance and participation, and document same in the medical record     [address] :    Complete peer assessment with patient to assess their needs and identify their goals to complete while in the recovery program as well as once discharged into the community  Patient will complete WRAP Plan, Crisis Plan and 5 Life Domains  Patient will attend 50% of groups offered on the unit  Patient will complete a goal card weekly  12/2/2019 2015 by Kp Pelaez RN  Outcome: Not Progressing  12/2/2019 2015 by Kp Pelaez RN  Outcome: Not Progressing  Goal: Complete daily ADLs, including personal hygiene independently, as able  Description  Interventions:  - Observe, teach, and assist patient with ADLS  - Monitor and promote a balance of rest/activity, with adequate nutrition and elimination   12/2/2019 2015 by Kp Pelaez RN  Outcome: Not Progressing  12/2/2019 2015 by Kp Pelaez RN  Outcome: Not Progressing     Problem: Nutrition/Hydration-ADULT  Goal: Nutrient/Hydration intake appropriate for improving, restoring or maintaining nutritional needs  Description  Monitor and assess patient's nutrition/hydration status for malnutrition  Collaborate with interdisciplinary team and initiate plan and interventions as ordered  Monitor patient's weight and dietary intake as ordered or per policy  Utilize nutrition screening tool and intervene as necessary  Determine patient's food preferences and provide high-protein, high-caloric foods as appropriate       INTERVENTIONS:  - Monitor oral intake, urinary output, labs, and treatment plans  - Assess nutrition and hydration status and recommend course of action  - Evaluate amount of meals eaten  - Assist patient with eating if necessary   - Allow adequate time for meals  - Recommend/ encourage appropriate diets, oral nutritional supplements, and vitamin/mineral supplements  - Order, calculate, and assess calorie counts as needed  - Recommend, monitor, and adjust tube feedings and TPN/PPN based on assessed needs  - Assess need for intravenous fluids  - Provide specific nutrition/hydration education as appropriate  - Include patient/family/caregiver in decisions related to nutrition  Outcome: Not Progressing     2015 Yoanna Higginbotham has been more visible, sits out @ phone chair when it is not in use  But, her time is spent badgering staff to do an accu check "for a baseline"  Insists is weak, lightheaded, but, shows no evidence of same  Is disheveled, but, unwilling to shower as afraid will "pass out"  Did start laughing when the nurse told her that the tenacity she demonstrates in repeatedly agitating for an accu check in itself speaks to her not being in a hypoglycemic state  Did come up for supper medicine  At meal had fluids (120ml juice, 240ml milk, 1/2 popsicle, but, no solids  In bed now; is not attending PM Group

## 2019-12-03 NOTE — PROGRESS NOTES
12/03/19 0900   Team Meeting   Meeting Type Tx Team Meeting   Initial Conference Date 12/03/19   Next Conference Date 12/10/19   Team Members Present   Team Members Present Nurse;; Other (Discipline and Name)   Nursing Team Member Alison Regalado RN   Care Management Team Member Brenda Fowler   Other (Discipline and Name) Alexis Winslow LCSW   Patient/Family Present   Patient Present No   Patient's Family Present No     Patient was offered but declined to attend her treatment team meeting this morning  Patient attended 12% of groups offered last week  Patient still struggling with attending groups, keeping up with hygiene and eating her meals  Still presents psychosomatic, which patient will agree with, however, unwilling to work on these issues  No other issues or concerns made known by the team this week

## 2019-12-03 NOTE — NURSING NOTE
Patient is in bed resting with eyes  Pt is noted to be comfortable and free from pain  Pt remains on O2 @ 1 L  Pt denies S/I,HI,VH  Will continue to monitor pt for the remaining of this shift

## 2019-12-03 NOTE — PROGRESS NOTES
Psychiatry Progress Note    Subjective: Interval History     [facility] was observed sitting in the dining room  She was very pleasant and cooperative upon interview  She states, "My issues with swallowing are psychosomatic, but that doesn't mean they don't hurt " Still preoccupied with not being able to swallow and, as a result, being malnourished  She has been asking the staff to check her blood sugar, stating that it is likely low due to lack of food intake  Staff do report that she tolerated a large portion of food on Thanksgiving, which was supplied by her sister, without any concerns or issues related to dysphagia  She has not been going to groups due to her physical symptoms  She alleges she is being "forced" to shower once a week  She denies any homicidal or suicidal ideations  She has been medication adherent       Behavior over the last 24 hours:  unchanged  Sleep: normal  Appetite: normal  Medication side effects: No  ROS: no complaints    Current medications:    Current Facility-Administered Medications:     acetaminophen (TYLENOL) tablet 650 mg, 650 mg, Oral, Q6H PRN, Manuel Copeland MD    albuterol (PROVENTIL HFA,VENTOLIN HFA) inhaler 2 puff, 2 puff, Inhalation, Q4H PRN, Manuel Copeland MD, 2 puff at 10/11/19 0424    aluminum-magnesium hydroxide-simethicone (MYLANTA) 200-200-20 mg/5 mL oral suspension 15 mL, 15 mL, Oral, Q4H PRN, Manuel Copeland MD    ammonium lactate (LAC-HYDRIN) 12 % lotion 1 application, 1 application, Topical, BID PRN, Manuel Copeland MD    benzonatate (TESSALON PERLES) capsule 100 mg, 100 mg, Oral, TID PRN, Manuel Copeland MD    benztropine (COGENTIN) injection 1 mg, 1 mg, Intramuscular, Q8H PRN, Manuel Copeland MD    cloZAPine (CLOZARIL) tablet 25 mg, 25 mg, Oral, BID, Manuel Copeland MD, 25 mg at 12/03/19 1702    cloZAPine (CLOZARIL) tablet 50 mg, 50 mg, Oral, BID, Manuel Copeland MD, 50 mg at 12/03/19 1703    EPINEPHrine PF (ADRENALIN) 1 mg/mL injection 0 15 mg, 0 15 mg, Intramuscular, Once PRN, Faheem Daniels MD    fluticasone-vilanterol (BREO ELLIPTA) 200-25 MCG/INH inhaler 1 puff, 1 puff, Inhalation, Daily, Faheem Daniels MD, 1 puff at 12/03/19 0830    ketotifen (ZADITOR) 0 025 % ophthalmic solution 1 drop, 1 drop, Right Eye, BID PRN, Faheem Daniels MD    levothyroxine tablet 125 mcg, 125 mcg, Oral, Early Morning, Faheem Daniels MD, 125 mcg at 12/03/19 0556    magnesium hydroxide (MILK OF MAGNESIA) 400 mg/5 mL oral suspension 30 mL, 30 mL, Oral, Daily PRN, Faheem Daniels MD    montelukast (SINGULAIR) tablet 10 mg, 10 mg, Oral, HS, Faheem Daniels MD, 10 mg at 11/12/19 2141    OLANZapine (ZyPREXA) IM injection 5 mg, 5 mg, Intramuscular, Q8H PRN, Faheem Daniels MD    OLANZapine (ZyPREXA) tablet 5 mg, 5 mg, Oral, Q8H PRN, Faheem Daniels MD    ondansetron (ZOFRAN-ODT) dispersible tablet 4 mg, 4 mg, Oral, Q6H PRN, Faheem Daniels MD, 4 mg at 11/09/19 1223    pantoprazole (PROTONIX) EC tablet 40 mg, 40 mg, Oral, Early Morning, Faheem Daniels MD, 40 mg at 12/03/19 0557    polyethylene glycol (MIRALAX) packet 17 g, 17 g, Oral, Daily PRN, Faheem Daniels MD    polyvinyl alcohol (LIQUIFILM TEARS) 1 4 % ophthalmic solution 1 drop, 1 drop, Both Eyes, Q3H PRN, Faheem Daniels MD    sertraline (ZOLOFT) tablet 50 mg, 50 mg, Oral, BID, Faheem Daniels MD, 50 mg at 12/03/19 0830    sucralfate (CARAFATE) oral suspension 1,000 mg, 1,000 mg, Oral, BID, Cat Torres MD, 1,000 mg at 12/03/19 1600    theophylline (JEF-24) 24 hr capsule 200 mg, 200 mg, Oral, Daily, Faheem Daniels MD, 200 mg at 12/03/19 0830    tiotropium (SPIRIVA) capsule for inhaler 18 mcg, 18 mcg, Inhalation, Daily, Faheem Daniels MD, 18 mcg at 12/03/19 0830    traZODone (DESYREL) tablet 25 mg, 25 mg, Oral, HS PRN, Faheem Daniels MD    Current Problem List:    Patient Active Problem List   Diagnosis    [facility]    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    Tobacco use disorder, continuous    [facility] disorder [facility]    Left hip pain    Lactic acidosis    Hypokalemia    Hypomagnesemia    Compression fracture of L4 lumbar vertebra    Thoracic compression fracture (HCC)    Ventral hernia    Parapneumonic effusion    Acute on chronic respiratory failure with hypoxia (HCC)    Chronic respiratory failure (HCC)    Hypophosphatemia    Elevated MCV    Schizoaffective disorder, bipolar type (HCC)    Acquired hypothyroidism    Gastroesophageal reflux disease without esophagitis    Abnormal CT of the chest    Excessive cerumen in left ear canal    Lipoma of right upper extremity    Polydipsia    Localized swelling of both lower legs    Noncompliant with deep vein thrombosis (DVT) prophylaxis    Allergic conjunctivitis of right eye    At risk for aspiration       Problem list reviewed 12/03/19     Objective:     Vital Signs:  Vitals:    12/02/19 2005 12/02/19 2130 12/03/19 0730 12/03/19 1600   BP: 100/62  110/74 101/60   BP Location: Left arm  Right arm Left arm   Pulse: 95  90 74   Resp: 18  18 16   Temp: 97 9 °F (36 6 °C)  97 7 °F (36 5 °C) (!) 97 4 °F (36 3 °C)   TempSrc: Temporal   Temporal   SpO2: (!) 88% 92%  96%   Weight:       Height:             Appearance:  age appropriate and casually dressed   Behavior:  guarded   Speech:  normal volume   Mood:  anxious   Affect:  mood-congruent   Thought Process:  goal directed   Thought Content:  delusions  persecutory   Perceptual Disturbances: None   Risk Potential: none   Sensorium:  person, place, situation and time   Cognition:  intact   Consciousness:  alert and awake    Attention: attention span and concentration were age appropriate   Intellect: average   Insight:  poor   Judgment: poor      Motor Activity: no abnormal movements       I/O Past 24 hours:  No intake/output data recorded  No intake/output data recorded  Labs:  Reviewed 12/03/19    Assessment / Plan:     Schizoaffective disorder, bipolar type ([facility] )    Recommended Treatment:      Medication changes:  1) continue current medication regimen  Non-pharmacological treatments  1) Continue with group therapy, milieu therapy and occupational therapy  Safety  1) Safety/communication plan established targeting dynamic risk factors above  2) Risks, benefits, and possible side effects of medications explained to patient and patient verbalizes understanding  Counseling / Coordination of Care    Total floor / unit time spent today 20 minutes  Greater than 50% of total time was spent with the patient and / or family counseling and / or coordination of care  A description of the counseling / coordination of care  Patient's Rights, confidentiality and exceptions to confidentiality, use of automated medical record, [facility] staff access to medical record, and consent to treatment reviewed      ABILIO Rodney

## 2019-12-03 NOTE — PROGRESS NOTES
12/03/19 1100   Activity/Group Checklist   Group   (IMR/Stress Management )   Attendance Did not attend   Attendance Duration (min) 46-60   Affect/Mood IRMA

## 2019-12-03 NOTE — PLAN OF CARE
Problem: Alteration in Thoughts and Perception  Goal: Treatment Goal: Gain control of psychotic behaviors/thinking, reduce/eliminate presenting symptoms and demonstrate improved reality functioning upon discharge  Outcome: Progressing  Goal: Verbalize thoughts and feelings  Description  Interventions:  - Promote a nonjudgmental and trusting relationship with the patient through active listening and therapeutic communication  - Assess patient's level of functioning, behavior and potential for risk  - Engage patient in 1 on 1 interactions for a minimum of 15 minutes each session  - Encourage patient to express fears, feelings, frustrations, and discuss symptoms    - [place] patient to reality, help patient recognize reality-based thinking   - Administer medications as ordered and assess for potential side effects  - Provide the patient education related to the signs and symptoms of the illness and desired effects of prescribed medications  Outcome: Progressing  Goal: Agree to be compliant with medication regime, as prescribed and report medication side effects  Description  Interventions:  - Offer appropriate PRN medication and supervise ingestion; conduct aims, as needed   Outcome: Progressing  Goal: Recognize dysfunctional thoughts, communicate reality-based thoughts at the time of discharge  Description  Interventions:  - Provide medication and psycho-education to assist patient in compliance and developing insight into his/her illness   Outcome: Progressing  Goal: Complete daily ADLs, including personal hygiene independently, as able  Description  Interventions:  - Observe, teach, and assist patient with ADLS  - Monitor and promote a balance of rest/activity, with adequate nutrition and elimination   Outcome: Progressing     Problem: Ineffective Coping  Goal: Identifies ineffective coping skills  Outcome: Progressing  Goal: Identifies healthy coping skills  Outcome: Progressing  Goal: Demonstrates healthy coping skills  Outcome: Progressing   Patient is noted to be asleep with eyes closed  No apparent issue observed  No acute respiratory distress noted  Patient remains free pain and discomfort  Will continue to monitor pt throughout the night

## 2019-12-04 NOTE — PROGRESS NOTES
Patient unable to attend      12/04/19 1400   Activity/Group Checklist   Group   ([facility] )   Attendance Did not attend   Attendance Duration (min) 31-45   Affect/Mood IRMA

## 2019-12-04 NOTE — PROGRESS NOTES
Progress Note - Cathy Livers 1955, 60 y o  female MRN: [id number]    [facility] Encounter: [id number]    Primary Care Provider: Donato Jackson PA-C   Date and time admitted to hospital: 7/23/2019  5:30 PM        * Schizoaffective disorder, bipolar type [facility])  Assessment & Plan  Psychiatry Progress Note  Patient has not taken a shower in the last 7 days claiming she will it take showers once a week which is better than every 2 weeks and claims that she is going to be helped in taking a shower by the evening nurse later today  She is still demanding to check her sugar claiming she has hypoglycemic and hardly eats her meals claiming that she cannot swallow  She is also attention seeking stating she is not feeling well as an excuse for not attending groups even though she knows she is supposed to  She is still choosing not to go to groups claiming that she cannot eat or drink or go out of her room despite her being able to go to the dining room and cope for medications  She was again told that the expectation is that she should take showers twice a week at least or at the least once a week   No signs or sxs of agranulocytosis or myocarditis or endocarditis and she is moving her bowels so far with no issues   She was again reminded to work with the program and work towards going back to the [facility] and start attending groups at least 25% and to  attend to her ADLs skills like continuing to take showers and changing clothes as well before we can look into discharging her back to the personal care home but she remains passive-aggressive and refuses to do anything to help herself   She was told that we may be forced to refer her to the [facility] due to lack of progress after being here for almost 5 months in a row   Has been compliant with medications  and is again accusatory to staff and paranoid and demanding and psychosomatic as usual but was polite and friendly but remains disheveled unkept and anxious as usual   She has been sleeping well and has been still using the oxygen concentrator at night   Current medications:    Current Facility-Administered Medications:     acetaminophen (TYLENOL) tablet 650 mg, 650 mg, Oral, Q6H PRN, Meredith Wesley MD    albuterol (PROVENTIL HFA,VENTOLIN HFA) inhaler 2 puff, 2 puff, Inhalation, Q4H PRN, Meredith Wesley MD, 2 puff at 10/11/19 0424    aluminum-magnesium hydroxide-simethicone (MYLANTA) 200-200-20 mg/5 mL oral suspension 15 mL, 15 mL, Oral, Q4H PRN, Meredith Wesley MD    ammonium lactate (LAC-HYDRIN) 12 % lotion 1 application, 1 application, Topical, BID PRN, Meredith Wesley MD    benzonatate (TESSALON PERLES) capsule 100 mg, 100 mg, Oral, TID PRN, Meredith Wesley MD    benztropine (COGENTIN) injection 1 mg, 1 mg, Intramuscular, Q8H PRN, Meredith Wesley MD    cloZAPine (CLOZARIL) tablet 25 mg, 25 mg, Oral, BID, Meredith Wesley MD, 25 mg at 12/03/19 2148    cloZAPine (CLOZARIL) tablet 50 mg, 50 mg, Oral, BID, Meredith Wesley MD, 50 mg at 12/03/19 2149    EPINEPHrine PF (ADRENALIN) 1 mg/mL injection 0 15 mg, 0 15 mg, Intramuscular, Once PRN, Meredith Wesley MD    fluticasone-vilanterol (BREO ELLIPTA) 200-25 MCG/INH inhaler 1 puff, 1 puff, Inhalation, Daily, Meredith Wesley MD, 1 puff at 12/04/19 0845    ketotifen (ZADITOR) 0 025 % ophthalmic solution 1 drop, 1 drop, Right Eye, BID PRN, Meredith Wesley MD    levothyroxine tablet 125 mcg, 125 mcg, Oral, Early Morning, Meredith Wesley MD, 125 mcg at 12/04/19 0603    magnesium hydroxide (MILK OF MAGNESIA) 400 mg/5 mL oral suspension 30 mL, 30 mL, Oral, Daily PRN, Meredith Wesley MD    montelukast (SINGULAIR) tablet 10 mg, 10 mg, Oral, HS, Meredith Wesley MD, 10 mg at 11/12/19 2141    OLANZapine (ZyPREXA) IM injection 5 mg, 5 mg, Intramuscular, Q8H PRN, Meredith Wesley MD    OLANZapine (ZyPREXA) tablet 5 mg, 5 mg, Oral, Q8H PRN, Meredith Wesley MD    ondansetron (ZOFRAN-ODT) dispersible tablet 4 mg, 4 mg, Oral, Q6H PRN, Sindy Day MD, 4 mg at 11/09/19 1223    pantoprazole (PROTONIX) EC tablet 40 mg, 40 mg, Oral, Early Morning, Sindy Day MD, 40 mg at 12/04/19 0603    polyethylene glycol (MIRALAX) packet 17 g, 17 g, Oral, Daily PRN, Sindy Day MD    polyvinyl alcohol (LIQUIFILM TEARS) 1 4 % ophthalmic solution 1 drop, 1 drop, Both Eyes, Q3H PRN, Sindy Day MD    sertraline (ZOLOFT) tablet 50 mg, 50 mg, Oral, BID, Sindy Day MD, 50 mg at 12/04/19 0845    sucralfate (CARAFATE) oral suspension 1,000 mg, 1,000 mg, Oral, BID, Saurav Berman MD, 1,000 mg at 12/04/19 0603    theophylline (JEF-24) 24 hr capsule 200 mg, 200 mg, Oral, Daily, Sindy Day MD, 200 mg at 12/04/19 0845    tiotropium [facility]) capsule for inhaler 18 mcg, 18 mcg, Inhalation, Daily, Sindy Day MD, 18 mcg at 12/04/19 0845    traZODone (DESYREL) tablet 25 mg, 25 mg, Oral, HS PRN, Sindy Day MD  Justification if on more than two antipsychotics:  Only on his clozapine  Side effects if any:  None    Risks , benefits, side effects and precautions of medications discussed with patient and reminded patient to let us know any problems with medications     Objective:     Vital Signs:  Vitals:    12/03/19 0730 12/03/19 1600 12/03/19 2000 12/04/19 0700   BP: 110/74 101/60 102/78 110/70   BP Location: Right arm Left arm Left arm Left arm   Pulse: 90 74 105 86   Resp: 18 16 18 17   Temp: 97 7 °F (36 5 °C) (!) 97 4 °F (36 3 °C) 98 2 °F (36 8 °C) 97 8 °F (36 6 °C)   TempSrc:  Temporal Temporal Temporal   SpO2:  96% 93%    Weight:       Height:         Appearance:  age appropriate, casually dressed and overweight older than stated age, wearing same clothing friendly and cooperative laying in bed but disheveled and unkept did get up when approached her to talk with me   Behavior:  evasive and guarded and suspicious and preoccupied with psychosomatic complaints appearing anxious and cooperative   Speech:  normal pitch and normal volume but circumstantial and tangential with stilted speech off and on   Mood:  anxious and dysthymic   Affect:  mood-congruent, elated and entitled anxious and irritated and sad at times but pleasant today when approached   Thought Process:  goal directed and illogical slightly pressured and tangential and talks as if in a court of law   Thought Content:  Delusional believing she cannot breathe or cannot swallow or having l hypoglycemia demanding extra juice or having cancer and dying from it which are all fixated psychosomatic delusions that have not changed  No current suicidal homicidal thoughts intent or plans reported  No phobias obsessions or compulsions reported  Also accuses staff of tampering with her medications and her spirometer and oxygen concentrator   Still delusional about the medical resident being her boyfriend and then laughs about it  Perceptual Disturbances: None and does not appear responding to internal stimuli at times   Risk Potential: Tendency to not care for herself    Sensorium:  person, place, time/date, situation, day of week, month of year and time   Cognition:  grossly intact   Consciousness:  alert and awake    Attention: Intact concentration and attention span   Intellect: Considered to be at least of average intelligence   Insight:  limited and in denial of her illness   Judgment: poor      Motor Activity: no abnormal movements         Recent Labs:  Results Reviewed     None          I/O Past 24 hours:  No intake/output data recorded  No intake/output data recorded          Assessment / Plan:     Sch[facility]oaffective [facility]      Reason for continued inpatient care:  Because of underlying paranoia and refusal to go back to the personal care home and inability to care for herself on her own  Acceptance by patient:  Accepting  Colette Sierras in recovery:  About living in another personal care home once she feels better  Understanding of medications :  Has some understanding  Involved in reintegration process:  Adjusting to the unit  Trusting in relatoinship with psychiatrist:  Trusting    Recommended Treatment:    Medication changes:  1) none today  Non-pharmacological treatments  1) continue with  individual therapy group therapy, milieu therapy and occupational therapy and milieu therapy involving multidisciplinary team approach with psychotherapist, case management, nursing, peer support services, art therapist etc using recovery principles and psycho-education about accepting illness and need for treatment   2) encourage to cocoperate with behavior plan  3) encourage to attend to ADLs like taking showers and wearing clean clothes   4) Encourage to attend groups   Safety  1) Safety/communication plan established targeting dynamic risk factors above  Discharge Plan most likely back to the a:  Personal care boarding home with act services once her delusions are managed and mood becomes more stabilized and she becomes more receptive to treatment compliance or consider referral to [facility] / Coordination of Care    Total floor / unit time spent today 15 minutes  Greater than 50% of total time was spent with the patient and / or family counseling and / or coordination of care  A description of the counseling / coordination of care  Patient's Rights, confidentiality and exceptions to confidentiality, use of automated medical record, [address] [place] [address] staff access to medical record, and consent to treatment reviewed      Allie Guzman MD

## 2019-12-04 NOTE — PLAN OF CARE
Problem: PAIN - ADULT  Goal: Verbalizes/displays adequate comfort level or baseline comfort level  Description  Interventions:  - Encourage patient to monitor pain and request assistance  - Assess pain using appropriate pain scale  - Administer analgesics based on type and severity of pain and evaluate response  - Implement non-pharmacological measures as appropriate and evaluate response  - Consider cultural and social influences on pain and pain management  - Notify physician/advanced practitioner if interventions unsuccessful or patient reports new pain  Outcome: Progressing     Problem: SAFETY ADULT  Goal: Patient will remain free of falls  Description  INTERVENTIONS:  - Assess patient frequently for physical needs  -  Identify cognitive and physical deficits and behaviors that affect risk of falls    -  [place] fall precautions as indicated by assessment   - Educate patient/family on patient safety including physical limitations  - Instruct patient to call for assistance with activity based on assessment  - Modify environment to reduce risk of injury  - Consider OT/PT consult to assist with strengthening/mobility  Outcome: Progressing     Problem: RESPIRATORY - ADULT  Goal: Achieves optimal ventilation and oxygenation  Description  INTERVENTIONS:  - Assess for changes in respiratory status  - Assess for changes in mentation and behavior  - Position to facilitate oxygenation and minimize respiratory effort  - Oxygen administration by appropriate delivery method based on oxygen saturation (per order) or ABGs  - Initiate smoking cessation education as indicated  - Encourage broncho-pulmonary hygiene including cough, deep breathe, Incentive Spirometry  - Assess the need for suctioning and aspirate as needed  - Assess and instruct to report SOB or any respiratory difficulty  - Respiratory Therapy support as indicated  Outcome: Progressing     Problem: SLEEP DISTURBANCE  Goal: Will exhibit normal sleeping pattern  Description  Interventions:  -  Assess the patients sleep pattern, noting recent changes  - Administer medication as ordered  - Decrease environmental stimuli, including noise, as appropriate during the night  - Encourage the patient to actively participate in unit groups and or exercise during the day to enhance ability to achieve adequate sleep at night  - Assess the patient, in the morning, encouraging a description of sleep experience  Outcome: Ivette Carmona in bed with eyes closed, breath even and unlabored   On O2 with humidifier @1L/m via nasal cannula   Awake, paranoid and somatic that her O2 is not working because she can not feel the 1L air, RN check the O2 concentrator and find it functioning  Leeann Alo needed reassurance that she was receiving the adequate oxygenation  Q 15 minutes rounding   Maintained on fall and SAFE precaution    No respiratory distress   Will continue to monitor

## 2019-12-04 NOTE — PROGRESS NOTES
12/03/19 1500   Activity/Group Checklist   Group   (Recovery Workshop/Goal Card Ideas )   Attendance Did not attend   Attendance Duration (min) 46-60   Affect/Mood IRMA

## 2019-12-04 NOTE — PLAN OF CARE
Problem: Alteration in Thoughts and Perception  Goal: Agree to be compliant with medication regime, as prescribed and report medication side effects  Description  Interventions:  - Offer appropriate PRN medication and supervise ingestion; conduct aims, as needed   Outcome: Progressing     Problem: Alteration in Thoughts and Perception  Goal: Verbalize thoughts and feelings  Description  Interventions:  - Promote a nonjudgmental and trusting relationship with the patient through active listening and therapeutic communication  - Assess patient's level of functioning, behavior and potential for risk  - Engage patient in 1 on 1 interactions for a minimum of 15 minutes each session  - Encourage patient to express fears, feelings, frustrations, and discuss symptoms    - [place] patient to reality, help patient recognize reality-based thinking   - Administer medications as ordered and assess for potential side effects  - Provide the patient education related to the signs and symptoms of the illness and desired effects of prescribed medications  Outcome: Not Progressing  Goal: Attend and participate in unit activities, including therapeutic, recreational, and educational groups  Description  Interventions:  - Provide therapeutic and educational activities daily, encourage attendance and participation, and document same in the medical record     CERTIFIED PEER SPECIALIST INTERVENTIONS:    Complete peer assessment with patient to assess their needs and identify their goals to complete while in the recovery program as well as once discharged into the community  Patient will complete WRAP Plan, Crisis Plan and [facility] [employer]  Patient will attend 50% of groups offered on the unit  Patient will complete a goal card weekly      Outcome: Not Progressing  Goal: Complete daily ADLs, including personal hygiene independently, as able  Description  Interventions:  - Observe, teach, and assist patient with ADLS  - Monitor and promote a balance of rest/activity, with adequate nutrition and elimination   Outcome: Not Progressing   Patient continues to be isolative and withdrawn  Remains somatic in regards to her "swallowing issue"  Ate 75% of her breakfast and will only eat when staff in near the dining room  She had a yogurt, all of her drinks and oatmeal   She stays in the dining room approx  30 minutes after meals but then retreats to her room and lays in bed  She has not showered since 11/27/19 so today is day #7 without showering  She was greatly encouraged this morning to complete her hygiene and shower however, she refused stating "I'm going to shower tonight"  She only at her ice cream and drank her drinks for lunch again today  She did attend [facility] group this shift  Continue to monitor

## 2019-12-04 NOTE — ASSESSMENT & PLAN NOTE
Psychiatry Progress Note  Patient has not taken a shower in the last 7 days claiming she will it take showers once a week which is better than every 2 weeks and claims that she is going to be helped in taking a shower by the evening nurse later today  She is still demanding to check her sugar claiming she has hypoglycemic and hardly eats her meals claiming that she cannot swallow  She is also attention seeking stating she is not feeling well as an excuse for not attending groups even though she knows she is supposed to  She is still choosing not to go to groups claiming that she cannot eat or drink or go out of her room despite her being able to go to the dining room and cope for medications  She was again told that the expectation is that she should take showers twice a week at least or at the least once a week   No signs or sxs of agranulocytosis or myocarditis or endocarditis and she is moving her bowels so far with no issues   She was again reminded to work with the program and work towards going back to the [facility] and start attending groups at least 25% and to  attend to her ADLs skills like continuing to take showers and changing clothes as well before we can look into discharging her back to the personal care home but she remains passive-aggressive and refuses to do anything to help herself   She was told that we may be forced to refer her to the [facility] hospital due to lack of progress after being here for almost 5 months in a row   Has been compliant with medications  and is again accusatory to staff and paranoid and demanding and psychosomatic as usual but was polite and friendly but remains disheveled unkept and anxious as usual   She has been sleeping well and has been still using the oxygen concentrator at night   Current medications:    Current Facility-Administered Medications:     acetaminophen (TYLENOL) tablet 650 mg, 650 mg, Oral, Q6H PRN, Jennifer Taveras MD    albuterol (PROVENTIL HFA,VENTOLIN HFA) inhaler 2 puff, 2 puff, Inhalation, Q4H PRN, Ivonne Nevarez MD, 2 puff at 10/11/19 0424    aluminum-magnesium hydroxide-simethicone (MYLANTA) 200-200-20 mg/5 mL oral suspension 15 mL, 15 mL, Oral, Q4H PRN, Ivonne Nevarez MD    ammonium lactate (LAC-HYDRIN) 12 % lotion 1 application, 1 application, Topical, BID PRN, Ivonne Nevarez MD    benzonatate (TESSALON PERLES) capsule 100 mg, 100 mg, Oral, TID PRN, Ivonne Nevarez MD    benztropine (COGENTIN) injection 1 mg, 1 mg, Intramuscular, Q8H PRN, Ivonne Nevarez MD    cloZAPine (CLOZARIL) tablet 25 mg, 25 mg, Oral, BID, Ivonne Nevarez MD, 25 mg at 12/03/19 2148    cloZAPine (CLOZARIL) tablet 50 mg, 50 mg, Oral, BID, Ivonne Nevarez MD, 50 mg at 12/03/19 2149    EPINEPHrine PF (ADRENALIN) 1 mg/mL injection 0 15 mg, 0 15 mg, Intramuscular, Once PRN, Ivonne Nevarez MD    fluticasone-vilanterol (BREO ELLIPTA) 200-25 MCG/INH inhaler 1 puff, 1 puff, Inhalation, Daily, Ivonne Nevarez MD, 1 puff at 12/04/19 0845    ketotifen (ZADITOR) 0 025 % ophthalmic solution 1 drop, 1 drop, Right Eye, BID PRN, Ivonne Nevarez MD    levothyroxine tablet 125 mcg, 125 mcg, Oral, Early Morning, Ivonne Nevarez MD, 125 mcg at 12/04/19 0603    magnesium hydroxide (MILK OF MAGNESIA) 400 mg/5 mL oral suspension 30 mL, 30 mL, Oral, Daily PRN, Ivonne Nevarez MD    montelukast (SINGULAIR) tablet 10 mg, 10 mg, Oral, HS, Ivonne Nevarez MD, 10 mg at 11/12/19 2141    OLANZapine (ZyPREXA) IM injection 5 mg, 5 mg, Intramuscular, Q8H PRN, Ivonne Nevarez MD    OLANZapine (ZyPREXA) tablet 5 mg, 5 mg, Oral, Q8H PRN, Ivonne Nevarez MD    ondansetron (ZOFRAN-ODT) dispersible tablet 4 mg, 4 mg, Oral, Q6H PRN, Ivonne Nevarez MD, 4 mg at 11/09/19 1223    pantoprazole (PROTONIX) EC tablet 40 mg, 40 mg, Oral, Early Morning, Ivonne Nevarez MD, 40 mg at 12/04/19 0603    polyethylene glycol (MIRALAX) packet 17 g, 17 g, Oral, Daily PRN, Ivonne Nevarez MD    polyvinyl alcohol (LIQUIFILM TEARS) 1 4 % ophthalmic solution 1 drop, 1 drop, Both Eyes, Q3H PRN, Roberto Shankar MD    sertraline (ZOLOFT) tablet 50 mg, 50 mg, Oral, BID, Roberto Shankar MD, 50 mg at 12/04/19 0845    sucralfate (CARAFATE) oral suspension 1,000 mg, 1,000 mg, Oral, BID, Mary Barnhart MD, 1,000 mg at 12/04/19 0603    theophylline (JEF-24) 24 hr capsule 200 mg, 200 mg, Oral, Daily, Roberto Shankar MD, 200 mg at 12/04/19 0845    tiotropium [facility]) capsule for inhaler 18 mcg, 18 mcg, Inhalation, Daily, Roberto Shankar MD, 18 mcg at 12/04/19 0845    traZODone (DESYREL) tablet 25 mg, 25 mg, Oral, HS PRN, Roberto Shankar MD  Justification if on more than two antipsychotics:  Only on his clozapine  Side effects if any:  None    Risks , benefits, side effects and precautions of medications discussed with patient and reminded patient to let us know any problems with medications     Objective:     Vital Signs:  Vitals:    12/03/19 0730 12/03/19 1600 12/03/19 2000 12/04/19 0700   BP: 110/74 101/60 102/78 110/70   BP Location: Right arm Left arm Left arm Left arm   Pulse: 90 74 105 86   Resp: 18 16 18 17   Temp: 97 7 °F (36 5 °C) (!) 97 4 °F (36 3 °C) 98 2 °F (36 8 °C) 97 8 °F (36 6 °C)   TempSrc:  Temporal Temporal Temporal   SpO2:  96% 93%    Weight:       Height:         Appearance:  age appropriate, casually dressed and overweight older than stated age, wearing same clothing friendly and cooperative laying in bed but disheveled and unkept did get up when approached her to talk with me   Behavior:  evasive and guarded and suspicious and preoccupied with psychosomatic complaints appearing anxious and cooperative   Speech:  normal pitch and normal volume but circumstantial and tangential with stilted speech off and on   Mood:  anxious and dysthymic   Affect:  mood-congruent, elated and entitled anxious and irritated and sad at times but pleasant today when approached   Thought Process:  goal directed and illogical slightly pressured and tangential and talks as if in a court of law   Thought Content:  Delusional believing she cannot breathe or cannot swallow or having l hypoglycemia demanding extra juice or having cancer and dying from it which are all fixated psychosomatic delusions that have not changed  No current suicidal homicidal thoughts intent or plans reported  No phobias obsessions or compulsions reported  Also accuses staff of tampering with her medications and her spirometer and oxygen concentrator   Still delusional about the medical resident being her boyfriend and then laughs about it  Perceptual Disturbances: None and does not appear responding to internal stimuli at times   Risk Potential: Tendency to not care for herself    Sensorium:  person, place, time/date, situation, day of week, month of year and time   Cognition:  grossly intact   Consciousness:  alert and awake    Attention: Intact concentration and attention span   Intellect: Considered to be at least of average intelligence   Insight:  limited and in denial of her illness   Judgment: poor      Motor Activity: no abnormal movements         Recent Labs:  Results Reviewed     None          I/O Past 24 hours:  No intake/output data recorded  No intake/output data recorded          Assessment / Plan:     Schizoaffective disorder, [facility] ([facility]      Reason for continued inpatient care:  Because of underlying paranoia and refusal to go back to the personal care home and inability to care for herself on her own  Acceptance by patient:  Accepting  Juan Lisa in recovery:  About living in another personal care home once she feels better  Understanding of medications :  Has some understanding  Involved in reintegration process:  Adjusting to the unit  Trusting in relatoinship with psychiatrist:  Trusting    Recommended Treatment:    Medication changes:  1) none today  Non-pharmacological treatments  1) continue with  individual therapy group therapy, milieu therapy and occupational therapy and milieu therapy involving multidisciplinary team approach with psychotherapist, case management, nursing, peer support services, art therapist etc using recovery principles and psycho-education about accepting illness and need for treatment   2) encourage to cocoperate with behavior plan  3) encourage to attend to ADLs like taking showers and wearing clean clothes   4) Encourage to attend groups   Safety  1) Safety/communication plan established targeting dynamic risk factors above  Discharge Plan most likely back to the a:  Personal care boarding home with act services once her delusions are managed and mood becomes more stabilized and she becomes more receptive to treatment compliance    Counseling / Coordination of Care    Total floor / unit time spent today 15 minutes  Greater than 50% of total time was spent with the patient and / or family counseling and / or coordination of care  A description of the counseling / coordination of care  Patient's Rights, confidentiality and exceptions to confidentiality, use of automated medical record, [facility] staff access to medical record, and consent to treatment reviewed      Roberto Colorado MD

## 2019-12-04 NOTE — PLAN OF CARE
Problem: Alteration in Thoughts and Perception  Goal: Agree to be compliant with medication regime, as prescribed and report medication side effects  Description  Interventions:  - Offer appropriate PRN medication and supervise ingestion; conduct aims, as needed   Outcome: Progressing     Problem: RESPIRATORY - ADULT  Goal: Achieves optimal ventilation and oxygenation  Description  INTERVENTIONS:  - Assess for changes in respiratory status  - Assess for changes in mentation and behavior  - Position to facilitate oxygenation and minimize respiratory effort  - Oxygen administration by appropriate delivery method based on oxygen saturation (per order) or ABGs  - Initiate smoking cessation education as indicated  - Encourage broncho-pulmonary hygiene including cough, deep breathe, Incentive Spirometry  - Assess the need for suctioning and aspirate as needed  - Assess and instruct to report SOB or any respiratory difficulty  - Respiratory Therapy support as indicated  Outcome: Progressing     Problem: Alteration in Thoughts and Perception  Goal: Attend and participate in unit activities, including therapeutic, recreational, and educational groups  Description  Interventions:  - Provide therapeutic and educational activities daily, encourage attendance and participation, and document same in the medical record     CERTIFIED PEER SPECIALIST INTERVENTIONS:    Complete peer assessment with patient to assess their needs and identify their goals to complete while in the recovery program as well as once discharged into the community  Patient will complete WRAP Plan, Crisis Plan and [facility] [employer]  Patient will attend 50% of groups offered on the unit  Patient will complete a goal card weekly      Outcome: Not Progressing  Goal: Complete daily ADLs, including personal hygiene independently, as able  Description  Interventions:  - Observe, teach, and assist patient with ADLS  - Monitor and promote a balance of rest/activity, with adequate nutrition and elimination   Outcome: Not Progressing     Problem: Depression  Goal: Refrain from isolation  Description  Interventions:  - Develop a trusting relationship   - Encourage socialization   Outcome: Not Progressing     Problem: Nutrition/Hydration-ADULT  Goal: Nutrient/Hydration intake appropriate for improving, restoring or maintaining nutritional needs  Description  Monitor and assess patient's nutrition/hydration status for malnutrition  Collaborate with interdisciplinary team and initiate plan and interventions as ordered  Monitor patient's weight and dietary intake as ordered or per policy  Utilize nutrition screening tool and intervene as necessary  Determine patient's food preferences and provide high-protein, high-caloric foods as appropriate  INTERVENTIONS:  - Monitor oral intake, urinary output, labs, and treatment plans  - Assess nutrition and hydration status and recommend course of action  - Evaluate amount of meals eaten  - Assist patient with eating if necessary   - Allow adequate time for meals  - Recommend/ encourage appropriate diets, oral nutritional supplements, and vitamin/mineral supplements  - Order, calculate, and assess calorie counts as needed  - Recommend, monitor, and adjust tube feedings and TPN/PPN based on assessed needs  - Assess need for intravenous fluids  - Provide specific nutrition/hydration education as appropriate  - Include patient/family/caregiver in decisions related to nutrition  Outcome: Not Progressing     [address] has been isolative to her bed & room throughout shift w/exception of meal, scheduled medicine, HS snack  No attention to hygiene  No interest in attending Recovery Workshop or PM Group  Took all scheduled medicines except the Singulair  Did use the Incentive Spirometer achieving volumes of 1100-1250ml  For meal took just fluids; juice, milk, coffee  For HS snack has 1 yogurt, 12oz milk   Still believes will choke if eats solids  Wearing now her QHS humidified nasal O2 @ 1L for bed  Is pleasant w/staff, but, a bit suspicious of & not interactive w/peers

## 2019-12-04 NOTE — PROGRESS NOTES
Patient not scheduled to attend      12/03/19 1430   Activity/Group Checklist   Group   (Community Programming Wrap up )   Attendance Did not attend   Attendance Duration (min) 16-30   Affect/Mood IRMA

## 2019-12-04 NOTE — PROGRESS NOTES
Patient attended and did very well in [facility] group/Patient able to identify emotions that were freely visible in her household as a child and others that were not shown often enough/ Patient was able to explain how this has effected her today as an adult and how it had an impact on her own parenting skills      12/04/19 1100   Activity/Group Checklist   Group   (IMR/Emotional Intelligence )   Attendance Attended   Attendance Duration (min) 46-60   Interactions Interacted appropriately   Affect/Mood Appropriate;Normal range;Bright   Goals Achieved Identified feelings; Identified triggers; Discussed coping strategies; Identified distorted thoughts/beliefs; Identified resources and support systems; Able to listen to others; Able to engage in interactions; Able to reflect/comment on own behavior;Able to manage/cope with feelings; Able to self-disclose

## 2019-12-04 NOTE — PROGRESS NOTES
12/04/19 0800   Team Meeting   Meeting Type Daily Rounds   Team Members Present   Team Members Present Physician;Nurse;; Other (Discipline and Name)   Physician Team Member Dr Ricardo Rivera Team Member Shashank Patricio RN   Care Management Team Member Brenda Sullivan   Other (Discipline and Name) Twan Richards, [facility]; Ying Barfield, [facility]     Patient still psychosomatic, refusing to eat her meals because she still believes she cant eat  Eats snacks, however  Dr Bryson Gallegos requesting that a [facility] referral be made  Slept

## 2019-12-05 NOTE — PLAN OF CARE
Problem: PAIN - ADULT  Goal: Verbalizes/displays adequate comfort level or baseline comfort level  Description  Interventions:  - Encourage patient to monitor pain and request assistance  - Assess pain using appropriate pain scale  - Administer analgesics based on type and severity of pain and evaluate response  - Implement non-pharmacological measures as appropriate and evaluate response  - Consider cultural and social influences on pain and pain management  - Notify physician/advanced practitioner if interventions unsuccessful or patient reports new pain  Outcome: Progressing     Problem: SAFETY ADULT  Goal: Patient will remain free of falls  Description  INTERVENTIONS:  - Assess patient frequently for physical needs  -  Identify cognitive and physical deficits and behaviors that affect risk of falls    -  [place] fall precautions as indicated by assessment   - Educate patient/family on patient safety including physical limitations  - Instruct patient to call for assistance with activity based on assessment  - Modify environment to reduce risk of injury  - Consider OT/PT consult to assist with strengthening/mobility  Outcome: Progressing     Problem: RESPIRATORY - ADULT  Goal: Achieves optimal ventilation and oxygenation  Description  INTERVENTIONS:  - Assess for changes in respiratory status  - Assess for changes in mentation and behavior  - Position to facilitate oxygenation and minimize respiratory effort  - Oxygen administration by appropriate delivery method based on oxygen saturation (per order) or ABGs  - Initiate smoking cessation education as indicated  - Encourage broncho-pulmonary hygiene including cough, deep breathe, Incentive Spirometry  - Assess the need for suctioning and aspirate as needed  - Assess and instruct to report SOB or any respiratory difficulty  - Respiratory Therapy support as indicated  Outcome: Progressing     Problem: SLEEP DISTURBANCE  Goal: Will exhibit normal sleeping pattern  Description  Interventions:  -  Assess the patients sleep pattern, noting recent changes  - Administer medication as ordered  - Decrease environmental stimuli, including noise, as appropriate during the night  - Encourage the patient to actively participate in unit groups and or exercise during the day to enhance ability to achieve adequate sleep at night  - Assess the patient, in the morning, encouraging a description of sleep experience  Outcome: Progressing     Leda Dull in bed with eyes closed, breath even and unlabored   On O2 with humidifier @1L/m via nasal cannula   Q 15 minutes rounding   Maintained on ongoing fall and SAFE precaution   No somatic complaint overnight   No respiratory distress   Will continue to monitor

## 2019-12-05 NOTE — ASSESSMENT & PLAN NOTE
Psychiatry Progress Note  Patient did finally take a shower after no showers for more than 7 days in a row with assistance from an evening name is that she particularly likes  She also attended the [facility] [place] CENTRAL group yesterday but not other groups  She remained attention seeking stating she is not feeling well as an excuse for not attending groups even though she knows she is supposed to  She is still choosing not to go to groups claiming that she cannot eat or drink or go out of her room despite her being able to go to the dining room and go up for medications  He is still psychosomatic claiming that she cannot swallow or has low blood sugar or she is going to die if she takes a shower and also believes that people are tampering with her medications or with the oxygen concentrator all her spirometer etc   She was again told that the expectation is that she should take showers twice a week at least or at the least once a week   No signs or sxs of agranulocytosis or myocarditis or endocarditis and she is moving her bowels so far with no issues   She was again reminded to work with the program and work towards going back to the [facility] and start attending groups at least 25% and to  attend to her ADLs skills like continuing to take showers and changing clothes as well before we can look into discharging her back to the personal care home but she remains passive-aggressive and refuses to do anything to help herself   She was told that we may be forced to refer her to the [facility] due to lack of progress after being here for almost 5 months in a row  She has been sleeping well and has been still using the oxygen concentrator at night and is looking forward to having family bring food for her tomorrow    Current medications:    Current Facility-Administered Medications:     acetaminophen (TYLENOL) tablet 650 mg, 650 mg, Oral, Q6H PRN, Teri Huggins MD    albuterol (PROVENTIL HFA,VENTOLIN HFA) inhaler 2 puff, 2 puff, Inhalation, Q4H PRN, Roberto [place] MD, 2 puff at 10/11/19 0424    aluminum-magnesium hydroxide-simethicone (MYLANTA) 200-200-20 mg/5 mL oral suspension 15 mL, 15 mL, Oral, Q4H PRN, Roberto [place] MD    ammonium lactate (LAC-HYDRIN) 12 % lotion 1 application, 1 application, Topical, BID PRN, Roberto [place] MD    benzonatate (TESSALON PERLES) capsule 100 mg, 100 mg, Oral, TID PRN, Roberto [place] MD    benztropine (COGENTIN) injection 1 mg, 1 mg, Intramuscular, Q8H PRN, Roberto [place] MD    cloZAPine (CLOZARIL) tablet 25 mg, 25 mg, Oral, BID, Roberto [place] MD, 25 mg at 12/04/19 2133    cloZAPine (CLOZARIL) tablet 50 mg, 50 mg, Oral, BID, Roberto [place] MD, 50 mg at 12/04/19 2133    EPINEPHrine PF (ADRENALIN) 1 mg/mL injection 0 15 mg, 0 15 mg, Intramuscular, Once PRN, Roberto [place] MD    fluticasone-vilanterol (BREO ELLIPTA) 200-25 MCG/INH inhaler 1 puff, 1 puff, Inhalation, Daily, Roberto [place] MD, 1 puff at 12/04/19 0845    ketotifen (ZADITOR) 0 025 % ophthalmic solution 1 drop, 1 drop, Right Eye, BID PRN, Roberto [place] MD    levothyroxine tablet 125 mcg, 125 mcg, Oral, Early Morning, Roberto [place] MD, 125 mcg at 12/05/19 0603    magnesium hydroxide (MILK OF MAGNESIA) 400 mg/5 mL oral suspension 30 mL, 30 mL, Oral, Daily PRN, Roberto [place] MD    montelukast (SINGULAIR) tablet 10 mg, 10 mg, Oral, HS, Roberto [place] MD, 10 mg at 11/12/19 2141    OLANZapine (ZyPREXA) IM injection 5 mg, 5 mg, Intramuscular, Q8H PRN, Roberto [place] MD    OLANZapine (ZyPREXA) tablet 5 mg, 5 mg, Oral, Q8H PRN, Roberto [place] MD    ondansetron (ZOFRAN-ODT) dispersible tablet 4 mg, 4 mg, Oral, Q6H PRN, Roberto [place] MD, 4 mg at 11/09/19 1223    pantoprazole (PROTONIX) EC tablet 40 mg, 40 mg, Oral, Early Morning, Roberto [place] MD, 40 mg at 12/05/19 0603    polyethylene glycol (MIRALAX) packet 17 g, 17 g, Oral, Daily PRN, Roberto [place] MD    polyvinyl alcohol (LIQUIFILM TEARS) 1 4 % ophthalmic solution 1 drop, 1 drop, Both Eyes, Q3H PRN, Jaz Beasley MD    sertraline (ZOLOFT) tablet 50 mg, 50 mg, Oral, BID, Jaz Beasley MD, 50 mg at 12/04/19 2132    sucralfate (CARAFATE) oral suspension 1,000 mg, 1,000 mg, Oral, BID, Maggie Andrade MD, 1,000 mg at 12/05/19 0603    theophylline (JEF-24) 24 hr capsule 200 mg, 200 mg, Oral, Daily, Jaz Beasley MD, 200 mg at 12/04/19 0845    tiotropium [facility]) capsule for inhaler 18 mcg, 18 mcg, Inhalation, Daily, Jaz Beasley MD, 18 mcg at 12/04/19 0845    traZODone (DESYREL) tablet 25 mg, 25 mg, Oral, HS PRN, Jaz Beasley MD  Justification if on more than two antipsychotics:  Only on his clozapine  Side effects if any:  None    Risks , benefits, side effects and precautions of medications discussed with patient and reminded patient to let us know any problems with medications     Objective:     Vital Signs:  Vitals:    12/03/19 2000 12/04/19 0700 12/04/19 1500 12/04/19 1919   BP: 102/78 110/70 103/69 96/59   BP Location: Left arm Left arm Left arm Left arm   Pulse: 105 86 83 72   Resp: 18 17 16 18   Temp: 98 2 °F (36 8 °C) 97 8 °F (36 6 °C) 98 7 °F (37 1 °C) 98 4 °F (36 9 °C)   TempSrc: Temporal Temporal Temporal Temporal   SpO2: 93%  93% 92%   Weight:       Height:         Appearance:  age appropriate, casually dressed and overweight older than stated age, friendly pleasant well kept with hair combed neatly and well dressed today after a shower from yesterday   Behavior:  evasive and guarded and suspicious and preoccupied with psychosomatic complaints appearing anxious and cooperative polite and friendly   Speech:  normal pitch and normal volume but circumstantial and tangential with stilted speech    Mood:  anxious and dysthymic   Affect:  mood-congruent, elated and entitled anxious and irritated and sad at times but pleasant today    Thought Process:  goal directed and illogical slightly pressured and tangential and talks as if in a court of law   Thought Content:  Still with fixed psychosomatic beliefss that she cannot breathe or cannot swallow or having  hypoglycemiaor having cancer and dying from it  No current suicidal homicidal thoughts intent or plans reported  No phobias obsessions or compulsions reported  Also accuses staff of tampering with her medications and her spirometer and oxygen concentrator   Perceptual Disturbances: None and does not appear responding to internal stimuli at times   Risk Potential: Tendency to not care for herself    Sensorium:  person, place, time/date, situation, day of week, month of year and time   Cognition:  grossly intact   Consciousness:  alert and awake    Attention: Intact concentration and attention span   Intellect: Considered to be at least of average intelligence   Insight:  limited and in denial of her illness   Judgment: poor      Motor Activity: no abnormal movements         Recent Labs:  Results Reviewed     None          I/O Past 24 hours:  No intake/output data recorded  No intake/output data recorded          Assessment / Plan:     Schizoaffective disorder, bipolar type ([facility] )      Reason for continued inpatient care:  Because of underlying paranoia and refusal to go back to the personal care home and inability to care for herself on her own  Acceptance by patient:  Accepting  [place] in recovery:  About living in another personal care home once she feels better  Understanding of medications :  Has some understanding  Involved in reintegration process:  Adjusting to the unit  Trusting in relatoinship with psychiatrist:  Trusting    Recommended Treatment:    Medication changes:  1) none today  Non-pharmacological treatments  1) continue with  individual therapy group therapy, milieu therapy and occupational therapy and milieu therapy involving multidisciplinary team approach with psychotherapist, case management, nursing, peer support services, art therapist etc using recovery principles and psycho-education about accepting illness and need for treatment   2) encourage to cocoperate with behavior plan  3) encourage to attend to ADLs like taking showers and wearing clean clothes   4) Encourage to attend groups   Safety  1) Safety/communication plan established targeting dynamic risk factors above  Discharge Plan most likely back to the a:  Personal care boarding home with act services once her delusions are managed and mood becomes more stabilized and she attends to her ADLs she becomes more receptive to treatment compliance but she has not shown any improvement in the last 5 months since she has been on the unit and hence the referral needs to be initiated for the  [address] [place] [address] / Coordination of Care    Total floor / unit time spent today 15 minutes  Greater than 50% of total time was spent with the patient and / or family counseling and / or coordination of care  A description of the counseling / coordination of care  Patient's Rights, confidentiality and exceptions to confidentiality, use of automated medical record, [facility] staff access to medical record, and consent to treatment reviewed      Deep Durand MD

## 2019-12-05 NOTE — PROGRESS NOTES
Patient engaged and participated in activity/ Patient able to identify secondary emotions/ Patient very attentive and affect much brighter/ Writer continues to encourage Pt to attend groups      12/05/19 1100   Activity/Group Checklist   Group   (IMR/Emotional Intelligence/Primary and Secondary Emotions )   Attendance Attended   Attendance Duration (min) 46-60   Interactions Interacted appropriately   Affect/Mood Appropriate;Normal range;Bright   Goals Achieved Identified feelings; Identified triggers; Discussed coping strategies; Able to engage in interactions; Able to listen to others; Able to reflect/comment on own behavior;Able to self-disclose; Able to manage/cope with feelings

## 2019-12-05 NOTE — PLAN OF CARE
Problem: Alteration in Thoughts and Perception  Goal: Verbalize thoughts and feelings  Description  Interventions:  - Promote a nonjudgmental and trusting relationship with the patient through active listening and therapeutic communication  - Assess patient's level of functioning, behavior and potential for risk  - Engage patient in 1 on 1 interactions for a minimum of 15 minutes each session  - Encourage patient to express fears, feelings, frustrations, and discuss symptoms    - [place] patient to reality, help patient recognize reality-based thinking   - Administer medications as ordered and assess for potential side effects  - Provide the patient education related to the signs and symptoms of the illness and desired effects of prescribed medications  Outcome: Progressing  Goal: Agree to be compliant with medication regime, as prescribed and report medication side effects  Description  Interventions:  - Offer appropriate PRN medication and supervise ingestion; conduct aims, as needed   Outcome: Progressing  Goal: Attend and participate in unit activities, including therapeutic, recreational, and educational groups  Description  Interventions:  - Provide therapeutic and educational activities daily, encourage attendance and participation, and document same in the medical record     CERTIFIED PEER SPECIALIST INTERVENTIONS:    Complete peer assessment with patient to assess their needs and identify their goals to complete while in the recovery program as well as once discharged into the community  Patient will complete WRAP Plan, Crisis Plan and 5 Life Domains  Patient will attend 50% of groups offered on the unit  Patient will complete a goal card weekly      Outcome: Progressing  Goal: Complete daily ADLs, including personal hygiene independently, as able  Description  Interventions:  - Observe, teach, and assist patient with ADLS  - Monitor and promote a balance of rest/activity, with adequate nutrition and elimination   Outcome: Progressing     Problem: Depression  Goal: Refrain from isolation  Description  Interventions:  - Develop a trusting relationship   - Encourage socialization   Outcome: Not Progressing     [address], early in shift, begging for an accu check prior to showering  Managed fine without it w/staff setting up shower, assisting her w/hair while she did the rest  For meal had only fluids (coffee, 4oz milk, ice cream)  Did attend PM Group  Did use the Incentive Spirometer achieving volumes ranging from 1000-1400ml  Did have HS snack of 1 yogurt & 12oz OJ  Looks forward to nasreen treat her sister to bring tomorrow  Pleasant, but, isolative to bed in free time  Took all scheduled medicines except the Singulair  Wearing now her QHS humidified nasal O2 @ 1L for bed

## 2019-12-05 NOTE — PROGRESS NOTES
Progress Note - Jose Cardenas 1955, 60 y o  female MRN: [id number]    Unit/Bed#: [facility] 110-02 Encounter: [id number]    Primary Care Provider: Anne Stern PA-C   Date and time admitted to hospital: 7/23/2019  5:30 PM        * Schizoaffective disorder, bipolar type [facility])  Assessment & Plan  Psychiatry Progress Note  Patient did finally take a shower after no showers for more than 7 days in a row with assistance from an evening name is that she particularly likes  She also attended the [facility] CENTRAL group yesterday but not other groups  She remained attention seeking stating she is not feeling well as an excuse for not attending groups even though she knows she is supposed to  She is still choosing not to go to groups claiming that she cannot eat or drink or go out of her room despite her being able to go to the dining room and go up for medications  He is still psychosomatic claiming that she cannot swallow or has low blood sugar or she is going to die if she takes a shower and also believes that people are tampering with her medications or with the oxygen concentrator all her spirometer etc   She was again told that the expectation is that she should take showers twice a week at least or at the least once a week   No signs or sxs of agranulocytosis or myocarditis or endocarditis and she is moving her bowels so far with no issues   She was again reminded to work with the program and work towards going back to the [facility] and start attending groups at least 25% and to  attend to her ADLs skills like continuing to take showers and changing clothes as well before we can look into discharging her back to the personal care home but she remains passive-aggressive and refuses to do anything to help herself   She was told that we may be forced to refer her to the [facility] hospital due to lack of progress after being here for almost 5 months in a row    She has been sleeping well and has been still using the oxygen concentrator at night and is looking forward to having family bring food for her tomorrow    Current medications:    Current Facility-Administered Medications:     acetaminophen (TYLENOL) tablet 650 mg, 650 mg, Oral, Q6H PRN, Alirio Frazier MD    albuterol (PROVENTIL HFA,VENTOLIN HFA) inhaler 2 puff, 2 puff, Inhalation, Q4H PRN, Alirio Frazier MD, 2 puff at 10/11/19 0424    aluminum-magnesium hydroxide-simethicone (MYLANTA) 200-200-20 mg/5 mL oral suspension 15 mL, 15 mL, Oral, Q4H PRN, Alirio Frazier MD    ammonium lactate (LAC-HYDRIN) 12 % lotion 1 application, 1 application, Topical, BID PRN, Alirio Frazier MD    benzonatate (TESSALON PERLES) capsule 100 mg, 100 mg, Oral, TID PRN, Alirio Frazier MD    benztropine (COGENTIN) injection 1 mg, 1 mg, Intramuscular, Q8H PRN, Alirio Frazier MD    cloZAPine (CLOZARIL) tablet 25 mg, 25 mg, Oral, BID, Alirio Frazier MD, 25 mg at 12/04/19 2133    cloZAPine (CLOZARIL) tablet 50 mg, 50 mg, Oral, BID, Alirio Frazier MD, 50 mg at 12/04/19 2133    EPINEPHrine PF (ADRENALIN) 1 mg/mL injection 0 15 mg, 0 15 mg, Intramuscular, Once PRN, Alirio Frazier MD    fluticasone-vilanterol (BREO ELLIPTA) 200-25 MCG/INH inhaler 1 puff, 1 puff, Inhalation, Daily, Alirio Frazier MD, 1 puff at 12/04/19 0845    ketotifen (ZADITOR) 0 025 % ophthalmic solution 1 drop, 1 drop, Right Eye, BID PRN, Alirio Frazier MD    levothyroxine tablet 125 mcg, 125 mcg, Oral, Early Morning, Alirio Frazier MD, 125 mcg at 12/05/19 0603    magnesium hydroxide (MILK OF MAGNESIA) 400 mg/5 mL oral suspension 30 mL, 30 mL, Oral, Daily PRN, Alirio Frazier MD    montelukast (SINGULAIR) tablet 10 mg, 10 mg, Oral, HS, Alirio Frazier MD, 10 mg at 11/12/19 2141    OLANZapine (ZyPREXA) IM injection 5 mg, 5 mg, Intramuscular, Q8H PRN, Alirio Frazier MD    OLANZapine (ZyPREXA) tablet 5 mg, 5 mg, Oral, Q8H PRN, Alirio Frazier MD    ondansetron (ZOFRAN-ODT) dispersible tablet 4 mg, 4 mg, Oral, Q6H PRN, Alirio Frazier MD, 4 mg at 11/09/19 1223    pantoprazole (PROTONIX) EC tablet 40 mg, 40 mg, Oral, Early Morning, Sandhya Bolaños MD, 40 mg at 12/05/19 0603    polyethylene glycol (MIRALAX) packet 17 g, 17 g, Oral, Daily PRN, Sandhya Bolaños MD    polyvinyl alcohol (LIQUIFILM TEARS) 1 4 % ophthalmic solution 1 drop, 1 drop, Both Eyes, Q3H PRN, Sandhya Bolaños MD    sertraline (ZOLOFT) tablet 50 mg, 50 mg, Oral, BID, Sandhya Bolaños MD, 50 mg at 12/04/19 2132    sucralfate (CARAFATE) oral suspension 1,000 mg, 1,000 mg, Oral, BID, Omar Jim MD, 1,000 mg at 12/05/19 0603    theophylline (JEF-24) 24 hr capsule 200 mg, 200 mg, Oral, Daily, Sandhya Bolaños MD, 200 mg at 12/04/19 0845    tiotropium [facility]) capsule for inhaler 18 mcg, 18 mcg, Inhalation, Daily, Sandhya Bolaños MD, 18 mcg at 12/04/19 0845    traZODone (DESYREL) tablet 25 mg, 25 mg, Oral, HS PRN, Sandhya Bolaños MD  Justification if on more than two antipsychotics:  Only on his clozapine  Side effects if any:  None    Risks , benefits, side effects and precautions of medications discussed with patient and reminded patient to let us know any problems with medications     Objective:     Vital Signs:  Vitals:    12/03/19 2000 12/04/19 0700 12/04/19 1500 12/04/19 1919   BP: 102/78 110/70 103/69 96/59   BP Location: Left arm Left arm Left arm Left arm   Pulse: 105 86 83 72   Resp: 18 17 16 18   Temp: 98 2 °F (36 8 °C) 97 8 °F (36 6 °C) 98 7 °F (37 1 °C) 98 4 °F (36 9 °C)   TempSrc: Temporal Temporal Temporal Temporal   SpO2: 93%  93% 92%   Weight:       Height:         Appearance:  age appropriate, casually dressed and overweight older than stated age, friendly pleasant well kept with hair combed neatly and well dressed today after a shower from yesterday   Behavior:  evasive and guarded and suspicious and preoccupied with psychosomatic complaints appearing anxious and cooperative polite and friendly   Speech:  normal pitch and normal volume but circumstantial and tangential with stilted speech    Mood:  anxious and dysthymic   Affect:  mood-congruent, elated and entitled anxious and irritated and sad at times but pleasant today    Thought Process:  goal directed and illogical slightly pressured and tangential and talks as if in a court of law   Thought Content:  Still with fixed psychosomatic beliefss that she cannot breathe or cannot swallow or having  hypoglycemiaor having cancer and dying from it  No current suicidal homicidal thoughts intent or plans reported  No phobias obsessions or compulsions reported  Also accuses staff of tampering with her medications and her spirometer and oxygen concentrator   Perceptual Disturbances: None and does not appear responding to internal stimuli at times   Risk Potential: Tendency to not care for herself    Sensorium:  person, place, time/date, situation, day of week, month of year and time   Cognition:  grossly intact   Consciousness:  alert and awake    Attention: Intact concentration and attention span   Intellect: Considered to be at least of average intelligence   Insight:  limited and in denial of her illness   Judgment: poor      Motor Activity: no abnormal movements         Recent Labs:  Results Reviewed     None          I/O Past 24 hours:  No intake/output data recorded  No intake/output data recorded          Assessment / Plan:     [facility]izoaffective disorder, [facility] ([facility] )      Reason for continued inpatient care:  Because of underlying paranoia and refusal to go back to the personal care home and inability to care for herself on her own  Acceptance by patient:  Accepting  Colette Castrejon in recovery:  About living in another personal care home once she feels better  Understanding of medications :  Has some understanding  Involved in reintegration process:  Adjusting to the unit  Trusting in relatoinship with psychiatrist:  Trusting    Recommended Treatment:    Medication changes:  1) none today  Non-pharmacological treatments  1) continue with  individual therapy group therapy, milieu therapy and occupational therapy and milieu therapy involving multidisciplinary team approach with psychotherapist, case management, nursing, peer support services, art therapist etc using recovery principles and psycho-education about accepting illness and need for treatment   2) encourage to cocoperate with behavior plan  3) encourage to attend to ADLs like taking showers and wearing clean clothes   4) Encourage to attend groups   Safety  1) Safety/communication plan established targeting dynamic risk factors above  Discharge Plan most likely back to the [facility]  Personal care [facility] with act services once her delusions are managed and mood becomes more stabilized and she attends to her ADLs she becomes more receptive to treatment compliance but she has not shown any improvement in the last 5 months since she has been on the unit and hence the referral needs to be initiated for the  [facility] but she tells me that she will try to attend more groups and take showers and she was told that if she shows improvement and she will be referred back to the personal care home which would be an incentive for her    Counseling / Coordination of Care    Total floor / unit time spent today 15 minutes  Greater than 50% of total time was spent with the patient and / or family counseling and / or coordination of care  A description of the counseling / coordination of care  Patient's Rights, confidentiality and exceptions to confidentiality, use of automated medical record, [facility] staff access to medical record, and consent to treatment reviewed      Sandhya Bolaños MD

## 2019-12-05 NOTE — PROGRESS NOTES
12/05/19 0800   Team Meeting   Meeting Type Daily Rounds   Team Members Present   Team Members Present Physician;Nurse;; Other (Discipline and Name)   Physician Team Member Dr Tammy Pires, RN   Care Management Team Member Brenda Mendez   Other (Discipline and Name) Rolm Mohs, JONATHON; Leigh Ann Neal, SHANIKA     Patient attended [address] and 3-11 groups  Showered  Suspicious of peers  Slept

## 2019-12-05 NOTE — PLAN OF CARE
Problem: Alteration in Thoughts and Perception  Goal: Agree to be compliant with medication regime, as prescribed and report medication side effects  Description  Interventions:  - Offer appropriate PRN medication and supervise ingestion; conduct aims, as needed   Outcome: Progressing  Goal: Attend and participate in unit activities, including therapeutic, recreational, and educational groups  Description  Interventions:  - Provide therapeutic and educational activities daily, encourage attendance and participation, and document same in the medical record     CERTIFIED PEER SPECIALIST INTERVENTIONS:    Complete peer assessment with patient to assess their needs and identify their goals to complete while in the recovery program as well as once discharged into the community  Patient will complete WRAP Plan, Crisis Plan and 5 Life Domains  Patient will attend 50% of groups offered on the unit  Patient will complete a goal card weekly  Outcome: Progressing  Goal: Recognize dysfunctional thoughts, communicate reality-based thoughts at the time of discharge  Description  Interventions:  - Provide medication and psycho-education to assist patient in compliance and developing insight into his/her illness   Outcome: Progressing     Problem: Depression  Goal: Refrain from isolation  Description  Interventions:  - Develop a trusting relationship   - Encourage socialization   Outcome: Jannette Verma has been isolative to her room in between scheduled meals and her medications which she was compliant with  Remains somatically preoccupied verbalizing that she is afraid of choking  Staff reassures her that we are available to help her  She ate 100% breakfast - ate oatmeal and yogurt with her fluids  10% lunch only drinking fluids (juice and milk)   Attended 1 group today but then spent most of the day sleeping in bed for the majority of the shift but pleasant and cooperative during interactions with this writer  Will continue to monitor for changes

## 2019-12-05 NOTE — PROGRESS NOTES
Progress Note - Behavioral Health     Melody Fox 60 y o  female MRN: [id number]   Unit/Bed#: [facility] 828-40 Encounter: [id number]    Per Nursing: Nursing reports that the patient has been pleasant and friendly on the unit  She has been calm and cooperative and has been compliant with her medications  Per Patient: Patient states that she is "grateful I'm six feet above ground " She is pleasant and smiling and appears cheerful  She reports that she slept well last evening  She states that her depression and anxiety are both a 5/10  She states that in terms of her mood overall, it may be too early to tell to predict for the whole day, but she feels better overall  She denies any presence of auditory or visual hallucinations  She states that she hasn't experienced any hallucinations in a while  She denies having any thoughts of wanting to harm herself or others  She denies any active or passive suicidal ideation, intent or plan  She is able to contract for safety on the unit  She feels good overall  Behavior over the last 24 hours: improving  Sleep: improved  Appetite: normal  Medication side effects: No   ROS: no complaints, all other systems are negative  Any positives in the Comprehensive Review of Systems were noted in the HPI  All other Review of Systems were negative          Mental Status Evaluation:    Appearance:  casually dressed, dressed appropriately, adequate grooming   Behavior:  pleasant, cooperative, calm, friendly, approachable, cheerful   Speech:  normal rate and volume   Mood:  normal, improved, euthymic   Affect:  normal range and intensity   Thought Process:  organized, goal directed   Associations: intact associations   Thought Content:  no overt delusions   Perceptual Disturbances: denies auditory hallucinations when asked, does not appear responding to internal stimuli   Risk Potential: Suicidal ideation - None at present  Homicidal ideation - None at present  Potential for aggression - Not at present   Sensorium:  oriented to person, place and time/date   Memory:  recent and remote memory grossly intact   Consciousness:  alert and awake   Attention: attention span and concentration appear shorter than expected for age   Insight:  improving   Judgment: improving   Gait/Station: normal gait/station   Motor Activity: no abnormal movements     Vital signs in last 24 hours:  Vitals:    12/07/19 0800   BP: (!) 85/51   Pulse: 64   Resp:    Temp:    SpO2:          Laboratory results:   I have personally reviewed all pertinent laboratory/tests results  Labs in last 72 hours:   Recent Labs     12/06/19  0746   WBC 9 10   RBC 4 68   HGB 14 5   HCT 43 3      RDW 13 7   NEUTROABS 5 30       Progress Toward Goals: improving    Assessment/Plan   Principal Problem:    Schizoaffective disorder, bipolar type ([facility])  Active Problems:    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[facility] [address] )    Acquired hypothyroidism    Gastroesophageal reflux disease without esophagitis    At risk for aspiration    Recommended Treatment:     Planned medication and treatment changes:     All current active medications have been reviewed  Encourage group therapy, milieu therapy and occupational therapy  Behavioral Health checks every 7 minutes  Continue current medications:    Current Facility-Administered Medications:  acetaminophen 650 mg Oral Q6H PRN Zander Yanez MD   albuterol 2 puff Inhalation Q4H PRN Zander Yanez MD   aluminum-magnesium hydroxide-simethicone 15 mL Oral Q4H PRN Zander Yanez MD   ammonium lactate 1 application Topical BID PRN Zander Yanez MD   benzonatate 100 mg Oral TID PRN Zander Yanez MD   benztropine 1 mg Intramuscular Q8H PRN Zander Yanez MD   cloZAPine 25 mg Oral BID Zander Yanez MD   cloZAPine 50 mg Oral BID Zander Yanez MD   EPINEPHrine PF 0 15 mg Intramuscular Once PRN Zander Yanez MD   fluticasone-vilanterol 1 puff Inhalation Daily Zander Yanez MD   ketotifen 1 drop Right Eye BID PRN Zander Yanez MD   levothyroxine 125 mcg Oral Early Morning Isidoro Gonzalez MD   magnesium hydroxide 30 mL Oral Daily PRN Isidoro Gonzalez MD   montelukast 10 mg Oral HS Isidoro Gonzalez MD   OLANZapine 5 mg Intramuscular Q8H PRN Isidoro Gonzalez MD   OLANZapine 5 mg Oral Q8H PRN Isidoro Gonzalez MD   ondansetron 4 mg Oral Q6H PRN Isidoro Gonzalez MD   pantoprazole 40 mg Oral Early Morning Isidoro Gonzalez MD   polyethylene glycol 17 g Oral Daily PRN Isidoro Gonzalez MD   polyvinyl alcohol 1 drop Both Eyes Q3H PRN Isidoro Gonzalez MD   sertraline 50 mg Oral BID Isidoro Gonzalez MD   sucralfate 1,000 mg Oral BID Arin Parker MD   theophylline 200 mg Oral Daily Isidoro Gonzalez MD   tiotropium 18 mcg Inhalation Daily Isidoro Gonzalez MD   traZODone 25 mg Oral HS PRN Isidoro Gonzalez MD           Plan:  1) Patient shows improvement in her mood and affect, and is brighter and more cheerful upon approach  She feels slightly better overall  Will continue to monitor patient while on the unit  2) Continue all current medications as directed:    Clozaril 75 mg twice daily by mouth   Zoloft 50 mg twice daily by mouth  3) Medical   All medical comorbidities are under the care of [facility]  4) Continue to work with Case Management to determine patient's disposition following discharge  Risks / Benefits of Treatment:    Risks, benefits, and possible side effects of medications explained to patient and patient verbalizes understanding and agreement for treatment  Counseling / Coordination of Care:    Patient's progress reviewed with nursing staff  Medications, treatment progress and treatment plan reviewed with patient      Paula Penn PA-C 12/07/19

## 2019-12-06 NOTE — PLAN OF CARE
Problem: SAFETY ADULT  Goal: Patient will remain free of falls  Description  INTERVENTIONS:  - Assess patient frequently for physical needs  -  Identify cognitive and physical deficits and behaviors that affect risk of falls  -  [place] fall precautions as indicated by assessment   - Educate patient/family on patient safety including physical limitations  - Instruct patient to call for assistance with activity based on assessment  - Modify environment to reduce risk of injury  - Consider OT/PT consult to assist with strengthening/mobility  Outcome: Progressing     Problem: RESPIRATORY - ADULT  Goal: Achieves optimal ventilation and oxygenation  Description  INTERVENTIONS:  - Assess for changes in respiratory status  - Assess for changes in mentation and behavior  - Position to facilitate oxygenation and minimize respiratory effort  - Oxygen administration by appropriate delivery method based on oxygen saturation (per order) or ABGs  - Initiate smoking cessation education as indicated  - Encourage broncho-pulmonary hygiene including cough, deep breathe, Incentive Spirometry  - Assess the need for suctioning and aspirate as needed  - Assess and instruct to report SOB or any respiratory difficulty  - Respiratory Therapy support as indicated  Outcome: Progressing    ~Maggie laying in bed with eyes closed, breath even and unlabored   On O2 with humidifier @1L/m via nasal cannula   Q 15 minutes rounding   Maintained on ongoing fall and SAFE precaution   No somatic complaint overnight   No respiratory distress   Will continue to monitor  ~Maggie has a weekly schedule lab CBC w/Diff to be obtain in the morning

## 2019-12-06 NOTE — PLAN OF CARE
Problem: Alteration in Thoughts and Perception  Goal: Treatment Goal: Gain control of psychotic behaviors/thinking, reduce/eliminate presenting symptoms and demonstrate improved reality functioning upon discharge  Outcome: Progressing  Goal: Verbalize thoughts and feelings  Description  Interventions:  - Promote a nonjudgmental and trusting relationship with the patient through active listening and therapeutic communication  - Assess patient's level of functioning, behavior and potential for risk  - Engage patient in 1 on 1 interactions for a minimum of 15 minutes each session  - Encourage patient to express fears, feelings, frustrations, and discuss symptoms    - [place] patient to reality, help patient recognize reality-based thinking   - Administer medications as ordered and assess for potential side effects  - Provide the patient education related to the signs and symptoms of the illness and desired effects of prescribed medications  Outcome: Progressing  Goal: Agree to be compliant with medication regime, as prescribed and report medication side effects  Description  Interventions:  - Offer appropriate PRN medication and supervise ingestion; conduct aims, as needed   Outcome: Progressing  Goal: Attend and participate in unit activities, including therapeutic, recreational, and educational groups  Description  Interventions:  - Provide therapeutic and educational activities daily, encourage attendance and participation, and document same in the medical record     CERTIFIED PEER SPECIALIST INTERVENTIONS:    Complete peer assessment with patient to assess their needs and identify their goals to complete while in the recovery program as well as once discharged into the community  Patient will complete WRAP Plan, Crisis Plan and 5 Life Domains  Patient will attend 50% of groups offered on the unit  Patient will complete a goal card weekly      Outcome: Progressing  Goal: Recognize dysfunctional thoughts, communicate reality-based thoughts at the time of discharge  Description  Interventions:  - Provide medication and psycho-education to assist patient in compliance and developing insight into his/her illness   Outcome: Progressing  Goal: Complete daily ADLs, including personal hygiene independently, as able  Description  Interventions:  - Observe, teach, and assist patient with ADLS  - Monitor and promote a balance of rest/activity, with adequate nutrition and elimination   Outcome: Progressing     Problem: Ineffective Coping  Goal: Identifies ineffective coping skills  Outcome: Progressing  Goal: Identifies healthy coping skills  Outcome: Progressing  Goal: Demonstrates healthy coping skills  Outcome: Progressing  Goal: Participates in unit activities  Description  Interventions:  - Provide therapeutic environment   - Provide required programming   - Redirect inappropriate behaviors   Outcome: Progressing  Goal: Patient/Family participate in treatment and DC plans  Description  Interventions:  - Provide therapeutic environment  Outcome: Progressing  Goal: Patient/Family verbalizes awareness of resources  Outcome: Progressing  Goal: Understands least restrictive measures  Description  Interventions:  - Utilize least restrictive behavior  Outcome: Progressing     Problem: Risk for Self Injury/Neglect  Goal: Treatment Goal: Remain safe during length of stay, learn and adopt new coping skills, and be free of self-injurious ideation, impulses and acts at the time of discharge  Outcome: Progressing  Goal: Verbalize thoughts and feelings  Description  Interventions:  - Assess and re-assess patient's lethality and potential for self-injury  - Engage patient in 1:1 interactions, daily, for a minimum of 15 minutes  - Encourage patient to express feelings, fears, frustrations, hopes  - Establish rapport/trust with patient   Outcome: Progressing  Goal: Refrain from harming self  Description  Interventions:  - Monitor patient closely, per order  - Develop a trusting relationship  - Supervise medication ingestion, monitor effects and side effects   Outcome: Progressing  Goal: Attend and participate in unit activities, including therapeutic, recreational, and educational groups  Description  Interventions:  - Provide therapeutic and educational activities daily, encourage attendance and participation, and document same in the medical record  - Obtain collateral information, encourage visitation and family involvement in care   Outcome: Progressing  Goal: Recognize maladaptive responses and adopt new coping mechanisms  Outcome: Progressing  Goal: Complete daily ADLs, including personal hygiene independently, as able  Description  Interventions:  - Observe, teach, and assist patient with ADLS  - Monitor and promote a balance of rest/activity, with adequate nutrition and elimination  Outcome: Progressing     Problem: Depression  Goal: Treatment Goal: Demonstrate behavioral control of depressive symptoms, verbalize feelings of improved mood/affect, and adopt new coping skills prior to discharge  Outcome: Progressing  Goal: Verbalize thoughts and feelings  Description  Interventions:  - Assess and re-assess patient's level of risk   - Engage patient in 1:1 interactions, daily, for a minimum of 15 minutes   - Encourage patient to express feelings, fears, frustrations, hopes   Outcome: Progressing  Goal: Refrain from isolation  Description  Interventions:  - Develop a trusting relationship   - Encourage socialization   Outcome: Progressing  Goal: Refrain from self-neglect  Outcome: Progressing     Problem: Anxiety  Goal: Anxiety is at manageable level  Description  Interventions:  - Assess and monitor patient's anxiety level  - Monitor for signs and symptoms of anxiety both physical and emotional (heart palpitations, chest pain, shortness of breath, headaches, nausea, feeling jumpy, restlessness, irritable, apprehensive)     - Collaborate with interdisciplinary team and initiate plan and interventions as ordered  - [place] patient to unit/surroundings  - Explain treatment plan  - Encourage participation in care  - Encourage verbalization of concerns/fears  - Identify coping mechanisms  - Assist in developing anxiety-reducing skills  - Administer/offer alternative therapies  - Limit or eliminate stimulants  Outcome: Progressing     Problem: Alteration in Orientation  Goal: Interact with staff daily  Description  Interventions:  - Assess and re-assess patient's level of orientation  - Engage patient in 1 on 1 interactions, daily, for a minimum of 15 minutes   - Establish rapport/trust with patient   Outcome: Progressing  Goal: Cooperate with recommended testing/procedures  Description  Interventions:  - Determine need for ancillary testing  - Observe for mental status changes  - Implement falls/precaution protocol   Outcome: Progressing     Problem: PAIN - ADULT  Goal: Verbalizes/displays adequate comfort level or baseline comfort level  Description  Interventions:  - Encourage patient to monitor pain and request assistance  - Assess pain using appropriate pain scale  - Administer analgesics based on type and severity of pain and evaluate response  - Implement non-pharmacological measures as appropriate and evaluate response  - Consider cultural and social influences on pain and pain management  - Notify physician/advanced practitioner if interventions unsuccessful or patient reports new pain  Outcome: Progressing     Problem: SAFETY ADULT  Goal: Patient will remain free of falls  Description  INTERVENTIONS:  - Assess patient frequently for physical needs  -  Identify cognitive and physical deficits and behaviors that affect risk of falls    -  [place] fall precautions as indicated by assessment   - Educate patient/family on patient safety including physical limitations  - Instruct patient to call for assistance with activity based on assessment  - Modify environment to reduce risk of injury  - Consider OT/PT consult to assist with strengthening/mobility  Outcome: Progressing     Problem: RESPIRATORY - ADULT  Goal: Achieves optimal ventilation and oxygenation  Description  INTERVENTIONS:  - Assess for changes in respiratory status  - Assess for changes in mentation and behavior  - Position to facilitate oxygenation and minimize respiratory effort  - Oxygen administration by appropriate delivery method based on oxygen saturation (per order) or ABGs  - Initiate smoking cessation education as indicated  - Encourage broncho-pulmonary hygiene including cough, deep breathe, Incentive Spirometry  - Assess the need for suctioning and aspirate as needed  - Assess and instruct to report SOB or any respiratory difficulty  - Respiratory Therapy support as indicated  Outcome: Progressing     Problem: DISCHARGE PLANNING  Goal: Discharge to home or other facility with appropriate resources  Description  INTERVENTIONS:  - Conduct assessment to determine patient/family and health care team treatment goals, and need for post-acute services based on payer coverage, community resources, and patient preferences, and barriers to discharge  - Address psychosocial, clinical, and financial barriers to discharge as identified in assessment in conjunction with the patient/family and health care team  - Assist the patient in reintegration back into the community by removing barriers which may hinder a successful discharge once deemed stable  - Arrange appropriate level of post-acute services according to patient's needs and preference and payer coverage in collaboration with the physician and health care team  - Communicate with and update the patient/family, physician, and health care team regarding progress on the discharge plan  - Arrange appropriate transportation to post-acute venues    Outcome: Progressing     Problem: SLEEP DISTURBANCE  Goal: Will exhibit normal sleeping pattern  Description  Interventions:  -  Assess the patients sleep pattern, noting recent changes  - Administer medication as ordered  - Decrease environmental stimuli, including noise, as appropriate during the night  - Encourage the patient to actively participate in unit groups and or exercise during the day to enhance ability to achieve adequate sleep at night  - Assess the patient, in the morning, encouraging a description of sleep experience  Outcome: Progressing     Problem: Nutrition/Hydration-ADULT  Goal: Nutrient/Hydration intake appropriate for improving, restoring or maintaining nutritional needs  Description  Monitor and assess patient's nutrition/hydration status for malnutrition  Collaborate with interdisciplinary team and initiate plan and interventions as ordered  Monitor patient's weight and dietary intake as ordered or per policy  Utilize nutrition screening tool and intervene as necessary  Determine patient's food preferences and provide high-protein, high-caloric foods as appropriate  INTERVENTIONS:  - Monitor oral intake, urinary output, labs, and treatment plans  - Assess nutrition and hydration status and recommend course of action  - Evaluate amount of meals eaten  - Assist patient with eating if necessary   - Allow adequate time for meals  - Recommend/ encourage appropriate diets, oral nutritional supplements, and vitamin/mineral supplements  - Order, calculate, and assess calorie counts as needed  - Recommend, monitor, and adjust tube feedings and TPN/PPN based on assessed needs  - Assess need for intravenous fluids  - Provide specific nutrition/hydration education as appropriate  - Include patient/family/caregiver in decisions related to nutrition  Outcome: Progressing      Pleasant, cooperative, visible intermittently  Ate 75% of breakfast and only 1 spoon of pudding at lunch  Rates depression and anxiety at 5:10 and denies any SI or HI  No c/o s/s pain, discomfort or distress  She attended [facility]  Compliant with meds    Will continue to monitor progress in recovery program

## 2019-12-06 NOTE — PROGRESS NOTES
12/06/19 0900   Team Meeting   Meeting Type Daily Rounds   Team Members Present   Team Members Present Physician;Nurse;; Other (Discipline and Name)   Physician Team Member Dr Sulma Tan Team Member Joseph Rosenthal RN   Care Management Team Member Brenda Fallon   Other (Discipline and Name) Augie Cam LCSW     Patient has been better about attending IMR groups  Still isolative to her room  Slept

## 2019-12-06 NOTE — PLAN OF CARE
Problem: Alteration in Thoughts and Perception  Goal: Verbalize thoughts and feelings  Description  Interventions:  - Promote a nonjudgmental and trusting relationship with the patient through active listening and therapeutic communication  - Assess patient's level of functioning, behavior and potential for risk  - Engage patient in 1 on 1 interactions for a minimum of 15 minutes each session  - Encourage patient to express fears, feelings, frustrations, and discuss symptoms    - [place] patient to reality, help patient recognize reality-based thinking   - Administer medications as ordered and assess for potential side effects  - Provide the patient education related to the signs and symptoms of the illness and desired effects of prescribed medications  Outcome: Progressing  Goal: Agree to be compliant with medication regime, as prescribed and report medication side effects  Description  Interventions:  - Offer appropriate PRN medication and supervise ingestion; conduct aims, as needed   Outcome: Progressing  Goal: Attend and participate in unit activities, including therapeutic, recreational, and educational groups  Description  Interventions:  - Provide therapeutic and educational activities daily, encourage attendance and participation, and document same in the medical record     CERTIFIED PEER SPECIALIST INTERVENTIONS:    Complete peer assessment with patient to assess their needs and identify their goals to complete while in the recovery program as well as once discharged into the community  Patient will complete WRAP Plan, Crisis Plan and 5 Life Domains  Patient will attend 50% of groups offered on the unit  Patient will complete a goal card weekly      Outcome: Progressing     Problem: Ineffective Coping  Goal: Participates in unit activities  Description  Interventions:  - Provide therapeutic environment   - Provide required programming   - Redirect inappropriate behaviors   Outcome: Progressing  Goal: Understands least restrictive measures  Description  Interventions:  - Utilize least restrictive behavior  Outcome: Progressing     Problem: Risk for Self Injury/Neglect  Goal: Refrain from harming self  Description  Interventions:  - Monitor patient closely, per order  - Develop a trusting relationship  - Supervise medication ingestion, monitor effects and side effects   Outcome: Progressing     Problem: SAFETY ADULT  Goal: Patient will remain free of falls  Description  INTERVENTIONS:  - Assess patient frequently for physical needs  -  Identify cognitive and physical deficits and behaviors that affect risk of falls  -  [place] fall precautions as indicated by assessment   - Educate patient/family on patient safety including physical limitations  - Instruct patient to call for assistance with activity based on assessment  - Modify environment to reduce risk of injury  - Consider OT/PT consult to assist with strengthening/mobility  Outcome: Progressing     Problem: RESPIRATORY - ADULT  Goal: Achieves optimal ventilation and oxygenation  Description  INTERVENTIONS:  - Assess for changes in respiratory status  - Assess for changes in mentation and behavior  - Position to facilitate oxygenation and minimize respiratory effort  - Oxygen administration by appropriate delivery method based on oxygen saturation (per order) or ABGs  - Initiate smoking cessation education as indicated  - Encourage broncho-pulmonary hygiene including cough, deep breathe, Incentive Spirometry  - Assess the need for suctioning and aspirate as needed  - Assess and instruct to report SOB or any respiratory difficulty  - Respiratory Therapy support as indicated  Outcome: Progressing     Problem: Nutrition/Hydration-ADULT  Goal: Nutrient/Hydration intake appropriate for improving, restoring or maintaining nutritional needs  Description  Monitor and assess patient's nutrition/hydration status for malnutrition   Collaborate with interdisciplinary team and initiate plan and interventions as ordered  Monitor patient's weight and dietary intake as ordered or per policy  Utilize nutrition screening tool and intervene as necessary  Determine patient's food preferences and provide high-protein, high-caloric foods as appropriate  INTERVENTIONS:  - Monitor oral intake, urinary output, labs, and treatment plans  - Assess nutrition and hydration status and recommend course of action  - Evaluate amount of meals eaten  - Assist patient with eating if necessary   - Allow adequate time for meals  - Recommend/ encourage appropriate diets, oral nutritional supplements, and vitamin/mineral supplements  - Order, calculate, and assess calorie counts as needed  - Recommend, monitor, and adjust tube feedings and TPN/PPN based on assessed needs  - Assess need for intravenous fluids  - Provide specific nutrition/hydration education as appropriate  - Include patient/family/caregiver in decisions related to nutrition  Outcome: Ricky Lott has been awake, alert, and visible intermittently out in the milieu  Less isolative to room  Pt again asking for accucheck to be done prior to supper-asymptomatic  Had 4 oz juice and 4 oz milk for supper  Sister visited with good interaction noted and brought food in which pt ate-large amount of spaghetti and a piece of blueberry pie  Attended and participated in evening group, declined snack after  Compliant with all scheduled meds except refused 1600 Carafate and 2200 Singulair  Pulse 86 and O2 sat 91% prior to putting HS O2 on at 1 ltr  Less somatic this shift  Continue to monitor/assess for any changes

## 2019-12-06 NOTE — PROGRESS NOTES
Progress Note - Selina Simon 1955, 60 y o  female MRN: [id number]    Unit/Bed#: [facility] 110-02 Encounter: [id number]    Primary Care Provider: Zahida Rosas PA-C   Date and time admitted to hospital: 7/23/2019  5:30 PM        * Schizoaffective disorder, bipolar type [facility])  Assessment & Plan  Psychiatry Progress Note  Patient is now attending a few groups including [facility] since she was told that we have placed her name on the waiting list to transfer to [facility]   He was again told that if she continues to keep up with the progress we may pull her off the waiting list and senna back to the [facility] personal care home where she came from  She he is still psychosomatic claiming that she cannot swallow or has low blood sugar or she is going to die if she takes a shower and also believes that people are tampering with her medications or with the oxygen concentrator all her spirometer etc   And none of these believes have not changed much and today she asked if she is anemic as she has been feeling dizzy even though she is not anemic on the blood work done in the past and her vitals have been fairly stable  She was again told that the expectation is that she should take showers twice a week at least or at the least once a week   No signs or sxs of agranulocytosis or myocarditis or endocarditis and she is moving her bowels so far with no issues   She has been sleeping well and has been still using the oxygen concentrator at night and is looking forward to having family bring food for her     Current medications:    Current Facility-Administered Medications:     acetaminophen (TYLENOL) tablet 650 mg, 650 mg, Oral, Q6H PRN, Greer Beltran MD    albuterol (PROVENTIL HFA,VENTOLIN HFA) inhaler 2 puff, 2 puff, Inhalation, Q4H PRN, Greer Beltran MD, 2 puff at 10/11/19 0424    aluminum-magnesium hydroxide-simethicone (MYLANTA) 200-200-20 mg/5 mL oral suspension 15 mL, 15 mL, Oral, Q4H PRN, MD Claus Damon ammonium lactate (LAC-HYDRIN) 12 % lotion 1 application, 1 application, Topical, BID PRN, Manuel Copeland MD    benzonatate (TESSALON PERLES) capsule 100 mg, 100 mg, Oral, TID PRN, Manuel Copeland MD    benztropine (COGENTIN) injection 1 mg, 1 mg, Intramuscular, Q8H PRN, Manuel Copeland MD    cloZAPine (CLOZARIL) tablet 25 mg, 25 mg, Oral, BID, Manuel Copeland MD, 25 mg at 12/05/19 2051    cloZAPine (CLOZARIL) tablet 50 mg, 50 mg, Oral, BID, Manuel Copeland MD, 50 mg at 12/05/19 2051    EPINEPHrine PF (ADRENALIN) 1 mg/mL injection 0 15 mg, 0 15 mg, Intramuscular, Once PRN, Manuel Copeland MD    fluticasone-vilanterol (BREO ELLIPTA) 200-25 MCG/INH inhaler 1 puff, 1 puff, Inhalation, Daily, Manuel Copeland MD, 1 puff at 12/06/19 0858    ketotifen (ZADITOR) 0 025 % ophthalmic solution 1 drop, 1 drop, Right Eye, BID PRN, Manuel Copeland MD    levothyroxine tablet 125 mcg, 125 mcg, Oral, Early Morning, Manuel Copeland MD, 125 mcg at 12/06/19 0600    magnesium hydroxide (MILK OF MAGNESIA) 400 mg/5 mL oral suspension 30 mL, 30 mL, Oral, Daily PRN, Manuel Copeland MD    montelukast (SINGULAIR) tablet 10 mg, 10 mg, Oral, HS, Manuel Copeland MD, 10 mg at 11/12/19 2141    OLANZapine (ZyPREXA) IM injection 5 mg, 5 mg, Intramuscular, Q8H PRN, Manuel Copeland MD    OLANZapine (ZyPREXA) tablet 5 mg, 5 mg, Oral, Q8H PRN, Manuel Copeland MD    ondansetron (ZOFRAN-ODT) dispersible tablet 4 mg, 4 mg, Oral, Q6H PRN, Manuel Copeland MD, 4 mg at 11/09/19 1223    pantoprazole (PROTONIX) EC tablet 40 mg, 40 mg, Oral, Early Morning, Manuel Copeland MD, 40 mg at 12/06/19 0600    polyethylene glycol (MIRALAX) packet 17 g, 17 g, Oral, Daily PRN, Manuel Copeland MD    polyvinyl alcohol (LIQUIFILM TEARS) 1 4 % ophthalmic solution 1 drop, 1 drop, Both Eyes, Q3H PRN, Manuel Copeland MD    sertraline (ZOLOFT) tablet 50 mg, 50 mg, Oral, BID, Manuel Copeland MD, 50 mg at 12/06/19 0855    sucralfate (CARAFATE) oral suspension 1,000 mg, 1,000 mg, Oral, BID, Ruperto Robles MD, 1,000 mg at 12/06/19 0600    theophylline (JEF-24) 24 hr capsule 200 mg, 200 mg, Oral, Daily, Shilpa Trujillo MD, 200 mg at 12/06/19 0855    tiotropium [facility]) capsule for inhaler 18 mcg, 18 mcg, Inhalation, Daily, Shilpa Trujillo MD, 18 mcg at 12/06/19 0858    traZODone (DESYREL) tablet 25 mg, 25 mg, Oral, HS PRN, Shilpa Trujillo MD  Justification if on more than two antipsychotics:  Only on his clozapine  Side effects if any:  None    Risks , benefits, side effects and precautions of medications discussed with patient and reminded patient to let us know any problems with medications     Objective:     Vital Signs:  Vitals:    12/05/19 1500 12/05/19 1900 12/05/19 2125 12/06/19 0730   BP: 96/66 115/76  99/58   BP Location: Left arm Left arm  Left arm   Pulse: 71 87 86 63   Resp: 15 17  17   Temp: 97 6 °F (36 4 °C) (!) 97 4 °F (36 3 °C)  97 9 °F (36 6 °C)   TempSrc: Temporal Temporal  Temporal   SpO2: 93% 92% 91%    Weight:       Height:         Appearance:  age appropriate, casually dressed and overweight older than stated age, friendly pleasant well kept with hair combed neatly and well dressed today found laying in bed   Behavior:  evasive and guarded and suspicious and preoccupied with psychosomatic complaints appearing anxious and cooperative polite and friendly when approached   Speech:  normal pitch and normal volume but circumstantial and tangential with stilted speech    Mood:  anxious and dysthymic   Affect:  mood-congruent, elated and entitled anxious and irritated and sad at times but pleasant today    Thought Process:  goal directed and illogical slightly pressured and tangential and talks as if in a court of law   Thought Content:  Still with fixed psychosomatic beliefss that she cannot breathe or cannot swallow or having  hypoglycemiaor having cancer and dying from it  Today she complains of feeling dizzy and things she may be anemic  No current suicidal homicidal thoughts intent or plans reported    No phobias obsessions or compulsions reported  Still accusatory  to certain staff  Perceptual Disturbances: None and does not appear responding to internal stimuli at times   Risk Potential: Tendency to not care for herself    Sensorium:  person, place, time/date, situation, day of week, month of year and time   Cognition:  grossly intact   Consciousness:  alert and awake    Attention: Intact concentration and attention span   Intellect: Considered to be at least of average intelligence   Insight:  limited and in denial of her illness   Judgment: poor      Motor Activity: no abnormal movements         Recent Labs:  Results Reviewed     None          I/O Past 24 hours:  No intake/output data recorded  No intake/output data recorded  Assessment / Plan:     Schizoaffective disorder, bipolar type ([facility] [address] )      Reason for continued inpatient care:  Because of underlying paranoia and refusal to go back to the personal care home and inability to care for herself on her own  Acceptance by patient:  Accepting  [place] Caraballo in recovery:  About living in another personal care home once she feels better  Understanding of medications :  Has some understanding  Involved in reintegration process:  Adjusting to the unit  Trusting in relatoinship with psychiatrist:  Trusting    Recommended Treatment:    Medication changes:  1) none today  Non-pharmacological treatments  1) continue with  individual therapy group therapy, milieu therapy and occupational therapy and milieu therapy involving multidisciplinary team approach with psychotherapist, case management, nursing, peer support services, art therapist etc using recovery principles and psycho-education about accepting illness and need for treatment     2) encourage to cocoperate with behavior plan  3) encourage to attend to ADLs like taking showers and wearing clean clothes   4) Encourage to attend groups   Safety  1) Safety/communication plan established targeting dynamic risk factors above   Discharge Plan most likely back to the a:  Personal care boarding home with act services once her delusions are managed and mood becomes more stabilized and she attends to her ADLs she becomes more receptive to treatment compliance but she has not shown any improvement in the last 5 months since she has been on the unit and hence the referral needs to be initiated for the  [address] [place] [address] / Coordination of Care    Total floor / unit time spent today 15 minutes  Greater than 50% of total time was spent with the patient and / or family counseling and / or coordination of care  A description of the counseling / coordination of care  Patient's Rights, confidentiality and exceptions to confidentiality, use of automated medical record, [facility] staff access to medical record, and consent to treatment reviewed      Deedee Muir MD

## 2019-12-06 NOTE — ASSESSMENT & PLAN NOTE
Psychiatry Progress Note  Patient is now attending a few groups including [facility] since she was told that we have placed her name on the waiting list to transfer to [facility]   He was again told that if she continues to keep up with the progress we may pull her off the waiting list and senna back to the [facility] own personal care home where she came from  She he is still psychosomatic claiming that she cannot swallow or has low blood sugar or she is going to die if she takes a shower and also believes that people are tampering with her medications or with the oxygen concentrator all her spirometer etc   And none of these believes have not changed much and today she asked if she is anemic as she has been feeling dizzy even though she is not anemic on the blood work done in the past and her vitals have been fairly stable  She was again told that the expectation is that she should take showers twice a week at least or at the least once a week   No signs or sxs of agranulocytosis or myocarditis or endocarditis and she is moving her bowels so far with no issues   She has been sleeping well and has been still using the oxygen concentrator at night and is looking forward to having family bring food for her     Current medications:    Current Facility-Administered Medications:     acetaminophen (TYLENOL) tablet 650 mg, 650 mg, Oral, Q6H PRN, Isidoro Gonzalez MD    albuterol (PROVENTIL HFA,VENTOLIN HFA) inhaler 2 puff, 2 puff, Inhalation, Q4H PRN, Isidoro Gonzalez MD, 2 puff at 10/11/19 0424    aluminum-magnesium hydroxide-simethicone (MYLANTA) 200-200-20 mg/5 mL oral suspension 15 mL, 15 mL, Oral, Q4H PRN, Isidoro Gonzalez MD    ammonium lactate (LAC-HYDRIN) 12 % lotion 1 application, 1 application, Topical, BID PRN, Isidoro Gonzalez MD    benzonatate (TESSALON PERLES) capsule 100 mg, 100 mg, Oral, TID PRN, Isidoro Gonzalez MD    benztropine (COGENTIN) injection 1 mg, 1 mg, Intramuscular, Q8H PRN, Isidoro Gonzalez MD    cloZAPine (CLOZARIL) tablet 25 mg, 25 mg, Oral, BID, Krystyna Mcclain MD, 25 mg at 12/05/19 2051    cloZAPine (CLOZARIL) tablet 50 mg, 50 mg, Oral, BID, Krystyna Mcclain MD, 50 mg at 12/05/19 2051    EPINEPHrine PF (ADRENALIN) 1 mg/mL injection 0 15 mg, 0 15 mg, Intramuscular, Once PRN, Krystyna Mcclain MD    fluticasone-vilanterol (BREO ELLIPTA) 200-25 MCG/INH inhaler 1 puff, 1 puff, Inhalation, Daily, Krystyna Mcclain MD, 1 puff at 12/06/19 0858    ketotifen (ZADITOR) 0 025 % ophthalmic solution 1 drop, 1 drop, Right Eye, BID PRN, Krystyna Mcclain MD    levothyroxine tablet 125 mcg, 125 mcg, Oral, Early Morning, Krystyna Mcclain MD, 125 mcg at 12/06/19 0600    magnesium hydroxide (MILK OF MAGNESIA) 400 mg/5 mL oral suspension 30 mL, 30 mL, Oral, Daily PRN, Krystyna Mcclain MD    montelukast (SINGULAIR) tablet 10 mg, 10 mg, Oral, HS, Krystyna Mcclain MD, 10 mg at 11/12/19 2141    OLANZapine (ZyPREXA) IM injection 5 mg, 5 mg, Intramuscular, Q8H PRN, Krystyna Mcclain MD    OLANZapine (ZyPREXA) tablet 5 mg, 5 mg, Oral, Q8H PRN, Krystyna Mcclain MD    ondansetron (ZOFRAN-ODT) dispersible tablet 4 mg, 4 mg, Oral, Q6H PRN, Krystyna Mcclain MD, 4 mg at 11/09/19 1223    pantoprazole (PROTONIX) EC tablet 40 mg, 40 mg, Oral, Early Morning, Krystyna Mcclain MD, 40 mg at 12/06/19 0600    polyethylene glycol (MIRALAX) packet 17 g, 17 g, Oral, Daily PRN, Krystyna Mcclain MD    polyvinyl alcohol (LIQUIFILM TEARS) 1 4 % ophthalmic solution 1 drop, 1 drop, Both Eyes, Q3H PRN, Krystyna Mcclain MD    sertraline (ZOLOFT) tablet 50 mg, 50 mg, Oral, BID, Krystyna Mcclain MD, 50 mg at 12/06/19 0855    sucralfate (CARAFATE) oral suspension 1,000 mg, 1,000 mg, Oral, BID, Cat Torres MD, 1,000 mg at 12/06/19 0600    theophylline (JEF-24) 24 hr capsule 200 mg, 200 mg, Oral, Daily, Krystyna Mcclain MD, 200 mg at 12/06/19 0855    tiotropium (SPIRIVA) capsule for inhaler 18 mcg, 18 mcg, Inhalation, Daily, Krystyna Mcclain MD, 18 mcg at 12/06/19 0858    traZODone (DESYREL) tablet 25 mg, 25 mg, Oral, HS PRN, Jerome Lopez MD  Justification if on more than two antipsychotics:  Only on his clozapine  Side effects if any:  None    Risks , benefits, side effects and precautions of medications discussed with patient and reminded patient to let us know any problems with medications     Objective:     Vital Signs:  Vitals:    12/05/19 1500 12/05/19 1900 12/05/19 2125 12/06/19 0730   BP: 96/66 115/76  99/58   BP Location: Left arm Left arm  Left arm   Pulse: 71 87 86 63   Resp: 15 17  17   Temp: 97 6 °F (36 4 °C) (!) 97 4 °F (36 3 °C)  97 9 °F (36 6 °C)   TempSrc: Temporal Temporal  Temporal   SpO2: 93% 92% 91%    Weight:       Height:         Appearance:  age appropriate, casually dressed and overweight older than stated age, friendly pleasant well kept with hair combed neatly and well dressed today found laying in bed   Behavior:  evasive and guarded and suspicious and preoccupied with psychosomatic complaints appearing anxious and cooperative polite and friendly when approached   Speech:  normal pitch and normal volume but circumstantial and tangential with stilted speech    Mood:  anxious and dysthymic   Affect:  mood-congruent, elated and entitled anxious and irritated and sad at times but pleasant today    Thought Process:  goal directed and illogical slightly pressured and tangential and talks as if in a court of law   Thought Content:  Still with fixed psychosomatic beliefss that she cannot breathe or cannot swallow or having  hypoglycemiaor having cancer and dying from it  Today she complains of feeling dizzy and things she may be anemic  No current suicidal homicidal thoughts intent or plans reported  No phobias obsessions or compulsions reported  Still accusatory  to certain staff      Perceptual Disturbances: None and does not appear responding to internal stimuli at times   Risk Potential: Tendency to not care for herself    Sensorium:  person, place, time/date, situation, day of week, month of year and time Cognition:  grossly intact   Consciousness:  alert and awake    Attention: Intact concentration and attention span   Intellect: Considered to be at least of average intelligence   Insight:  limited and in denial of her illness   Judgment: poor      Motor Activity: no abnormal movements         Recent Labs:  Results Reviewed     None          I/O Past 24 hours:  No intake/output data recorded  No intake/output data recorded  Assessment / Plan:     Schizoaffective disorder, bipolar type ([facility] [address] )      Reason for continued inpatient care:  Because of underlying paranoia and refusal to go back to the personal care home and inability to care for herself on her own  Acceptance by patient:  Accepting  Clara Saenz in recovery:  About living in another personal care home once she feels better  Understanding of medications :  Has some understanding  Involved in reintegration process:  Adjusting to the unit  Trusting in relatoinship with psychiatrist:  Trusting    Recommended Treatment:    Medication changes:  1) none today  Non-pharmacological treatments  1) continue with  individual therapy group therapy, milieu therapy and occupational therapy and milieu therapy involving multidisciplinary team approach with psychotherapist, case management, nursing, peer support services, art therapist etc using recovery principles and psycho-education about accepting illness and need for treatment   2) encourage to cocoperate with behavior plan  3) encourage to attend to ADLs like taking showers and wearing clean clothes   4) Encourage to attend groups   Safety  1) Safety/communication plan established targeting dynamic risk factors above    Discharge Plan most likely back to the a:  Personal care boarding home with act services once her delusions are managed and mood becomes more stabilized and she attends to her ADLs she becomes more receptive to treatment compliance but she has not shown any improvement in the last 5 months since she has been on the unit and hence the referral needs to be initiated for the  [address] [place] [address] / Coordination of Care    Total floor / unit time spent today 15 minutes  Greater than 50% of total time was spent with the patient and / or family counseling and / or coordination of care  A description of the counseling / coordination of care  Patient's Rights, confidentiality and exceptions to confidentiality, use of automated medical record, [facility] staff access to medical record, and consent to treatment reviewed      Tree Madden MD

## 2019-12-07 NOTE — PLAN OF CARE
Problem: Alteration in Thoughts and Perception  Goal: Agree to be compliant with medication regime, as prescribed and report medication side effects  Description  Interventions:  - Offer appropriate PRN medication and supervise ingestion; conduct aims, as needed   Outcome: Progressing     Problem: Alteration in Thoughts and Perception  Goal: Attend and participate in unit activities, including therapeutic, recreational, and educational groups  Description  Interventions:  - Provide therapeutic and educational activities daily, encourage attendance and participation, and document same in the medical record     CERTIFIED PEER SPECIALIST INTERVENTIONS:    Complete peer assessment with patient to assess their needs and identify their goals to complete while in the recovery program as well as once discharged into the community  Patient will complete WRAP Plan, Crisis Plan and 5 Life Domains  Patient will attend 50% of groups offered on the unit  Patient will complete a goal card weekly  Outcome: Not Progressing  Goal: Recognize dysfunctional thoughts, communicate reality-based thoughts at the time of discharge  Description  Interventions:  - Provide medication and psycho-education to assist patient in compliance and developing insight into his/her illness   Outcome: Not Progressing   Patient continues to be isolative and withdrawn  Remains somatic in regards to her "swallowing issue"  Ate 100% of her breakfast this morning which was french toast and oatmeal   No group attendance noted this shift  She only ate 10% of her lunch which was just the peaches and her drinks  She continues to lack motivation and insight  Continue to monitor

## 2019-12-07 NOTE — PLAN OF CARE
Problem: PAIN - ADULT  Goal: Verbalizes/displays adequate comfort level or baseline comfort level  Description  Interventions:  - Encourage patient to monitor pain and request assistance  - Assess pain using appropriate pain scale  - Administer analgesics based on type and severity of pain and evaluate response  - Implement non-pharmacological measures as appropriate and evaluate response  - Consider cultural and social influences on pain and pain management  - Notify physician/advanced practitioner if interventions unsuccessful or patient reports new pain  Outcome: Progressing     Problem: SAFETY ADULT  Goal: Patient will remain free of falls  Description  INTERVENTIONS:  - Assess patient frequently for physical needs  -  Identify cognitive and physical deficits and behaviors that affect risk of falls    -  [place] fall precautions as indicated by assessment   - Educate patient/family on patient safety including physical limitations  - Instruct patient to call for assistance with activity based on assessment  - Modify environment to reduce risk of injury  - Consider OT/PT consult to assist with strengthening/mobility  Outcome: Progressing     Problem: RESPIRATORY - ADULT  Goal: Achieves optimal ventilation and oxygenation  Description  INTERVENTIONS:  - Assess for changes in respiratory status  - Assess for changes in mentation and behavior  - Position to facilitate oxygenation and minimize respiratory effort  - Oxygen administration by appropriate delivery method based on oxygen saturation (per order) or ABGs  - Initiate smoking cessation education as indicated  - Encourage broncho-pulmonary hygiene including cough, deep breathe, Incentive Spirometry  - Assess the need for suctioning and aspirate as needed  - Assess and instruct to report SOB or any respiratory difficulty  - Respiratory Therapy support as indicated  Outcome: Progressing     Problem: SLEEP DISTURBANCE  Goal: Will exhibit normal sleeping pattern  Description  Interventions:  -  Assess the patients sleep pattern, noting recent changes  - Administer medication as ordered  - Decrease environmental stimuli, including noise, as appropriate during the night  - Encourage the patient to actively participate in unit groups and or exercise during the day to enhance ability to achieve adequate sleep at night  - Assess the patient, in the morning, encouraging a description of sleep experience  Outcome: Ivette Zavala in bed with eyes closed, breath even and unlabored   On O2 with humidifier @1L/m via nasal cannula   Q 15 minutes rounding   Maintained on ongoing fall and SAFE precaution   No somatic complaint overnight   No respiratory distress   Will continue to monitor

## 2019-12-07 NOTE — PROGRESS NOTES
Progress Note - Behavioral Health     Taylor Sena 60 y o  female MRN: [id number]   Unit/Bed#: [facility] 842-48 Encounter: [id number]    Per Nursing: Nursing reports that patient has been calm and cooperative on the unit  She has been pleasant and compliant with her meals and medications  She has denied any complaints  Per Patient: Patient states that she is in a good mood  She states that she slept well last night  She smiles and appears cheerful  She is friendly and brightens upon approach  She denies any negative thoughts or any feelings of depression or sadness  She denies any feelings of anxiety  She denies any thoughts of wanting to harm herself  She denies any active or passive suicidal ideation, intent or plan  She denies any auditory or visual hallucinations  She is future oriented and goal directed  She does not have any complaints and she feels good on her medications  She hopes that this Provider will have a good day and a good week  Behavior over the last 24 hours: improving  Sleep: normal  Appetite: normal  Medication side effects: No   ROS: no complaints, all other systems are negative  Any positives in the Comprehensive Review of Systems were noted in the HPI  All other Review of Systems were negative          Mental Status Evaluation:    Appearance:  age appropriate, casually dressed   Behavior:  normal, pleasant, cooperative, calm, friendly, cheerful, kind   Speech:  normal rate and volume   Mood:  normal, improved, euthymic   Affect:  normal range and intensity, appropriate, brightens upon approach   Thought Process:  organized, logical, coherent, goal directed   Associations: intact associations   Thought Content:  no overt delusions   Perceptual Disturbances: denies auditory hallucinations when asked, does not appear responding to internal stimuli   Risk Potential: Suicidal ideation - None at present, contracts for safety on the unit  Homicidal ideation - None at present  Potential for aggression - Not at present   Sensorium:  oriented to person, place and time/date   Memory:  recent and remote memory grossly intact   Consciousness:  alert and awake   Attention: attention span and concentration are age appropriate   Insight:  improving   Judgment: improving   Gait/Station: normal gait/station   Motor Activity: no abnormal movements     Vital signs in last 24 hours:  Vitals:    12/08/19 0732   BP: 92/53   Pulse: 67   Resp:    Temp:    SpO2:          Laboratory results:   I have personally reviewed all pertinent laboratory/tests results  Labs in last 72 hours:   Recent Labs     12/06/19  0746   WBC 9 10   RBC 4 68   HGB 14 5   HCT 43 3      RDW 13 7   NEUTROABS 5 30       Progress Toward Goals: improving    Assessment/Plan   Principal Problem:    Schizoaffective disorder, bipolar type ([facility])  Active Problems:    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[facility] [address] )    Acquired hypothyroidism    Gastroesophageal reflux disease without esophagitis    At risk for aspiration    Recommended Treatment:     Planned medication and treatment changes:     All current active medications have been reviewed  Encourage group therapy, milieu therapy and occupational therapy  Behavioral Health checks every 7 minutes  Continue current medications:    Current Facility-Administered Medications:  acetaminophen 650 mg Oral Q6H PRN Christian Bennett MD   albuterol 2 puff Inhalation Q4H PRN Christian Bennett MD   aluminum-magnesium hydroxide-simethicone 15 mL Oral Q4H PRN Christian Bennett MD   ammonium lactate 1 application Topical BID PRN Christian Bennett MD   benzonatate 100 mg Oral TID PRN Christian Bennett MD   benztropine 1 mg Intramuscular Q8H PRN Christian Bennett MD   cloZAPine 25 mg Oral BID Christian Bennett MD   cloZAPine 50 mg Oral BID Christian Bennett MD   EPINEPHrine PF 0 15 mg Intramuscular Once PRN Christian Bennett MD   fluticasone-vilanterol 1 puff Inhalation Daily Christian Bennett MD   ketotifen 1 drop Right Eye BID PRN Christian Bennett MD   levothyroxine 125 mcg Oral Early Morning Isidoro Gonzalez MD   magnesium hydroxide 30 mL Oral Daily PRN Isidoro Gonzalez MD   montelukast 10 mg Oral HS Isidoro Gonzalez MD   OLANZapine 5 mg Intramuscular Q8H PRN Isidoro Gonzalez MD   OLANZapine 5 mg Oral Q8H PRN Isidoro Gonzalez MD   ondansetron 4 mg Oral Q6H PRN Isidoro Gonzalez MD   pantoprazole 40 mg Oral Early Morning Isidoro Gonzalez MD   polyethylene glycol 17 g Oral Daily PRN Isidoro Gonzalez MD   polyvinyl alcohol 1 drop Both Eyes Q3H PRN Isidoro Gonzalez MD   sertraline 50 mg Oral BID Isidoro Gonzalez MD   sucralfate 1,000 mg Oral BID Arin Parker MD   theophylline 200 mg Oral Daily Isidoro Gonzalez MD   tiotropium 18 mcg Inhalation Daily Isidoro Gonzalez MD   traZODone 25 mg Oral HS PRN Isidoro Gonzalez MD           Plan:  1) Patient continues to show improvement in her mood and affect, and remains brighter and more cheerful upon approach  She is feeling better overall  Will continue to monitor patient while on the unit  2) Continue all current medications as directed:    Clozaril 75 mg twice daily by mouth   Zoloft 50 mg twice daily by mouth  3) Medical   All medical comorbidities are under the care of [place] [address] [facility]  4) Continue to work with Case Management to determine patient's disposition following discharge  Risks / Benefits of Treatment:    Risks, benefits, and possible side effects of medications explained to patient and patient verbalizes understanding and agreement for treatment  Counseling / Coordination of Care:    Patient's progress reviewed with nursing staff  Medications, treatment progress and treatment plan reviewed with patient      Paula Penn PA-C 12/08/19

## 2019-12-08 NOTE — PLAN OF CARE
Problem: Risk for Self Injury/Neglect  Goal: Treatment Goal: Remain safe during length of stay, learn and adopt new coping skills, and be free of self-injurious ideation, impulses and acts at the time of discharge  Outcome: Progressing  Goal: Refrain from harming self  Description  Interventions:  - Monitor patient closely, per order  - Develop a trusting relationship  - Supervise medication ingestion, monitor effects and side effects   Outcome: Progressing  Goal: Attend and participate in unit activities, including therapeutic, recreational, and educational groups  Description  Interventions:  - Provide therapeutic and educational activities daily, encourage attendance and participation, and document same in the medical record  - Obtain collateral information, encourage visitation and family involvement in care   Outcome: Progressing     Problem: Depression  Goal: Treatment Goal: Demonstrate behavioral control of depressive symptoms, verbalize feelings of improved mood/affect, and adopt new coping skills prior to discharge  Outcome: Progressing     Problem: Anxiety  Goal: Anxiety is at manageable level  Description  Interventions:  - Assess and monitor patient's anxiety level  - Monitor for signs and symptoms of anxiety both physical and emotional (heart palpitations, chest pain, shortness of breath, headaches, nausea, feeling jumpy, restlessness, irritable, apprehensive)  - Collaborate with interdisciplinary team and initiate plan and interventions as ordered    - [place] patient to unit/surroundings  - Explain treatment plan  - Encourage participation in care  - Encourage verbalization of concerns/fears  - Identify coping mechanisms  - Assist in developing anxiety-reducing skills  - Administer/offer alternative therapies  - Limit or eliminate stimulants  Outcome: Progressing     Problem: PAIN - ADULT  Goal: Verbalizes/displays adequate comfort level or baseline comfort level  Description  Interventions:  - Encourage patient to monitor pain and request assistance  - Assess pain using appropriate pain scale  - Administer analgesics based on type and severity of pain and evaluate response  - Implement non-pharmacological measures as appropriate and evaluate response  - Consider cultural and social influences on pain and pain management  - Notify physician/advanced practitioner if interventions unsuccessful or patient reports new pain  Outcome: Progressing     Problem: RESPIRATORY - ADULT  Goal: Achieves optimal ventilation and oxygenation  Description  INTERVENTIONS:  - Assess for changes in respiratory status  - Assess for changes in mentation and behavior  - Position to facilitate oxygenation and minimize respiratory effort  - Oxygen administration by appropriate delivery method based on oxygen saturation (per order) or ABGs  - Initiate smoking cessation education as indicated  - Encourage broncho-pulmonary hygiene including cough, deep breathe, Incentive Spirometry  - Assess the need for suctioning and aspirate as needed  - Assess and instruct to report SOB or any respiratory difficulty  - Respiratory Therapy support as indicated  Outcome: Progressing   Patient appeared to be asleep throughout the night with no apparent issues  O2 maintained at 1 L throughout the night  Patient is in bed currently  Will continue to monitor on q 15 minutes checks

## 2019-12-08 NOTE — PLAN OF CARE
Problem: Alteration in Thoughts and Perception  Goal: Verbalize thoughts and feelings  Description  Interventions:  - Promote a nonjudgmental and trusting relationship with the patient through active listening and therapeutic communication  - Assess patient's level of functioning, behavior and potential for risk  - Engage patient in 1 on 1 interactions for a minimum of 15 minutes each session  - Encourage patient to express fears, feelings, frustrations, and discuss symptoms    - [place] patient to reality, help patient recognize reality-based thinking   - Administer medications as ordered and assess for potential side effects  - Provide the patient education related to the signs and symptoms of the illness and desired effects of prescribed medications  Outcome: Progressing  Goal: Agree to be compliant with medication regime, as prescribed and report medication side effects  Description  Interventions:  - Offer appropriate PRN medication and supervise ingestion; conduct aims, as needed   Outcome: Progressing  Goal: Attend and participate in unit activities, including therapeutic, recreational, and educational groups  Description  Interventions:  - Provide therapeutic and educational activities daily, encourage attendance and participation, and document same in the medical record     CERTIFIED PEER SPECIALIST INTERVENTIONS:    Complete peer assessment with patient to assess their needs and identify their goals to complete while in the recovery program as well as once discharged into the community  Patient will complete WRAP Plan, Crisis Plan and 5 Life Domains  Patient will attend 50% of groups offered on the unit  Patient will complete a goal card weekly      Outcome: Progressing     Problem: Depression  Goal: Refrain from isolation  Description  Interventions:  - Develop a trusting relationship   - Encourage socialization   Outcome: Progressing     [address] [facility] has been visible in that sits on [place] phone chair when it is not in use & talks to staff  She is pleasant, came up for all scheduled medicines except the Singulair  For meal ate all mashed potato, all pie, milk, juice  At HS snack had milk, 4 cookies, 1 yogurt  Rather pleased w/her intake today  Also attended PM Group  Did her Incentive Spirometer & achieved volumes of 1250ml  Wearing now her QHS humidified nasal O2 @ 1L for bed

## 2019-12-08 NOTE — PLAN OF CARE
Problem: Alteration in Thoughts and Perception  Goal: Agree to be compliant with medication regime, as prescribed and report medication side effects  Description  Interventions:  - Offer appropriate PRN medication and supervise ingestion; conduct aims, as needed   Outcome: Progressing     Problem: Alteration in Thoughts and Perception  Goal: Attend and participate in unit activities, including therapeutic, recreational, and educational groups  Description  Interventions:  - Provide therapeutic and educational activities daily, encourage attendance and participation, and document same in the medical record     CERTIFIED PEER SPECIALIST INTERVENTIONS:    Complete peer assessment with patient to assess their needs and identify their goals to complete while in the recovery program as well as once discharged into the community  Patient will complete WRAP Plan, Crisis Plan and 5 Life Domains  Patient will attend 50% of groups offered on the unit  Patient will complete a goal card weekly  Outcome: Not Progressing  Goal: Recognize dysfunctional thoughts, communicate reality-based thoughts at the time of discharge  Description  Interventions:  - Provide medication and psycho-education to assist patient in compliance and developing insight into his/her illness   Outcome: Not Progressing  Goal: Complete daily ADLs, including personal hygiene independently, as able  Description  Interventions:  - Observe, teach, and assist patient with ADLS  - Monitor and promote a balance of rest/activity, with adequate nutrition and elimination   Outcome: Not Progressing   Patient continues to be isolative and withdrawn  Remains somatic in regards to her "swallowing issue"  Ate 25% of her breakfast this morning which was oatmeal and all of her drinks  No group attendance noted this shift  She only ate 10% of her lunch which was only ice cream and her drinks  She continues to lack motivation and insight    Remains in bed sleeping on and off between meals times  Continue to monitor

## 2019-12-09 PROBLEM — H92.09 EAR ACHE: Status: ACTIVE | Noted: 2019-01-01

## 2019-12-09 NOTE — PLAN OF CARE
Problem: Alteration in Thoughts and Perception  Goal: Verbalize thoughts and feelings  Description  Interventions:  - Promote a nonjudgmental and trusting relationship with the patient through active listening and therapeutic communication  - Assess patient's level of functioning, behavior and potential for risk  - Engage patient in 1 on 1 interactions for a minimum of 15 minutes each session  - Encourage patient to express fears, feelings, frustrations, and discuss symptoms    - [place] patient to reality, help patient recognize reality-based thinking   - Administer medications as ordered and assess for potential side effects  - Provide the patient education related to the signs and symptoms of the illness and desired effects of prescribed medications  Outcome: Progressing  Goal: Agree to be compliant with medication regime, as prescribed and report medication side effects  Description  Interventions:  - Offer appropriate PRN medication and supervise ingestion; conduct aims, as needed   Outcome: Progressing  Goal: Attend and participate in unit activities, including therapeutic, recreational, and educational groups  Description  Interventions:  - Provide therapeutic and educational activities daily, encourage attendance and participation, and document same in the medical record     CERTIFIED PEER SPECIALIST INTERVENTIONS:    Complete peer assessment with patient to assess their needs and identify their goals to complete while in the recovery program as well as once discharged into the community  Patient will complete WRAP Plan, Crisis Plan and 5 Life Domains  Patient will attend 50% of groups offered on the unit  Patient will complete a goal card weekly      Outcome: Progressing     Problem: Depression  Goal: Refrain from isolation  Description  Interventions:  - Develop a trusting relationship   - Encourage socialization   Outcome: Progressing     Problem: Nutrition/Hydration-ADULT  Goal: Nutrient/Hydration intake appropriate for improving, restoring or maintaining nutritional needs  Description  Monitor and assess patient's nutrition/hydration status for malnutrition  Collaborate with interdisciplinary team and initiate plan and interventions as ordered  Monitor patient's weight and dietary intake as ordered or per policy  Utilize nutrition screening tool and intervene as necessary  Determine patient's food preferences and provide high-protein, high-caloric foods as appropriate  INTERVENTIONS:  - Monitor oral intake, urinary output, labs, and treatment plans  - Assess nutrition and hydration status and recommend course of action  - Evaluate amount of meals eaten  - Assist patient with eating if necessary   - Allow adequate time for meals  - Recommend/ encourage appropriate diets, oral nutritional supplements, and vitamin/mineral supplements  - Order, calculate, and assess calorie counts as needed  - Recommend, monitor, and adjust tube feedings and TPN/PPN based on assessed needs  - Assess need for intravenous fluids  - Provide specific nutrition/hydration education as appropriate  - Include patient/family/caregiver in decisions related to nutrition  Outcome: Progressing     Problem: Alteration in Thoughts and Perception  Goal: Complete daily ADLs, including personal hygiene independently, as able  Description  Interventions:  - Observe, teach, and assist patient with ADLS  - Monitor and promote a balance of rest/activity, with adequate nutrition and elimination   Outcome: Not Progressing     [address] has been more visible sitting out in [place] in phone chair when it is not in use  She enjoys chatting w/staff or airing her concerns w/staff  Does not interact w/peers, vaguely suspicious of peers  Her intake for her was fair tonight; had all mashed potato, bites turkey & stuffing, milk, juice, coffee for meal  At HS snack had 4 cookies, 1 yogurt, 12 oz milk   Was pleased has lost less than 1 lb in past week  Agrees to goal to increase intake more this coming week so has no more weight loss  Was medicine compliant w/exception of Riaz  Did attend PM Group  Was verbal about concern over medically compromised brother, but, relaxed after reassuring phone call from sister  Wearing now her QHS humidified nasal O2 @ 1L for bed

## 2019-12-09 NOTE — PROGRESS NOTES
Progress Note - Erika Hsuter 1955, 60 y o  female MRN: [id number]    [facility]-02 Encounter: [id number]    Primary Care Provider: Sheron Rangel PA-C   Date and time admitted to hospital: 7/23/2019  5:30 PM        * Schizoaffective disorder, bipolar type [facility])  Assessment & Plan  Psychiatry Progress Note  Patient is still reporting that she is attending IMR groups and is friendly take another shower later today  She  is still psychosomatic claiming that she cannot swallow or has low blood sugar or she is going to die if she takes a shower and also believes that people are tampering with her medications or with the oxygen concentrator all her spirometer and claims that she was not feeling well last night  And none of these beliefss have not changed much   She was again told that the expectation is that she should take showers twice a week at least or at the least once a week and reminded that without showers she will not be welcome to come to team tomorrow   No signs or sxs of agranulocytosis or myocarditis or endocarditis and she is moving her bowels so far with no issues      She has been sleeping well and has been still using the oxygen concentrator at night and reports that she is happy to hear that she can go back to the  personal care home she came from if she keeps up with her improvement or face transfer to the [facility]   Current medications:    Current Facility-Administered Medications:     acetaminophen (TYLENOL) tablet 650 mg, 650 mg, Oral, Q6H PRN, Meredith Wesley MD    albuterol (PROVENTIL HFA,VENTOLIN HFA) inhaler 2 puff, 2 puff, Inhalation, Q4H PRN, Meredith Wesley MD, 2 puff at 10/11/19 0424    aluminum-magnesium hydroxide-simethicone (MYLANTA) 200-200-20 mg/5 mL oral suspension 15 mL, 15 mL, Oral, Q4H PRN, Meredith Wesley MD    ammonium lactate (LAC-HYDRIN) 12 % lotion 1 application, 1 application, Topical, BID PRN, Meredith Wesley MD    benzonatate (TESSALON PERLES) capsule 100 mg, 100 mg, Oral, TID PRN, Chichi Lockett MD    benztropine (COGENTIN) injection 1 mg, 1 mg, Intramuscular, Q8H PRN, Chichi Lockett MD    cloZAPine (CLOZARIL) tablet 25 mg, 25 mg, Oral, BID, Chichi Lockett MD, 25 mg at 12/08/19 2128    cloZAPine (CLOZARIL) tablet 50 mg, 50 mg, Oral, BID, Chichi Lockett MD, 50 mg at 12/08/19 2129    EPINEPHrine PF (ADRENALIN) 1 mg/mL injection 0 15 mg, 0 15 mg, Intramuscular, Once PRN, Chichi Lockett MD    fluticasone-vilanterol (BREO ELLIPTA) 200-25 MCG/INH inhaler 1 puff, 1 puff, Inhalation, Daily, Chichi Lockett MD, 1 puff at 12/09/19 0903    ketotifen (ZADITOR) 0 025 % ophthalmic solution 1 drop, 1 drop, Right Eye, BID PRN, Chichi Lockett MD    levothyroxine tablet 125 mcg, 125 mcg, Oral, Early Morning, Chichi Lockett MD, 125 mcg at 12/09/19 0546    magnesium hydroxide (MILK OF MAGNESIA) 400 mg/5 mL oral suspension 30 mL, 30 mL, Oral, Daily PRN, Chichi Lockett MD    montelukast (SINGULAIR) tablet 10 mg, 10 mg, Oral, HS, Chichi Lockett MD, 10 mg at 11/12/19 2141    OLANZapine (ZyPREXA) IM injection 5 mg, 5 mg, Intramuscular, Q8H PRN, Chichi Lockett MD    OLANZapine (ZyPREXA) tablet 5 mg, 5 mg, Oral, Q8H PRN, Chichi Lockett MD    ondansetron (ZOFRAN-ODT) dispersible tablet 4 mg, 4 mg, Oral, Q6H PRN, Chichi Lockett MD, 4 mg at 11/09/19 1223    pantoprazole (PROTONIX) EC tablet 40 mg, 40 mg, Oral, Early Morning, Chichi Lockett MD, 40 mg at 12/09/19 0546    polyethylene glycol (MIRALAX) packet 17 g, 17 g, Oral, Daily PRN, Chichi Lockett MD    polyvinyl alcohol (LIQUIFILM TEARS) 1 4 % ophthalmic solution 1 drop, 1 drop, Both Eyes, Q3H PRN, Chichi Lockett MD    sertraline (ZOLOFT) tablet 50 mg, 50 mg, Oral, BID, Chichi Lockett MD, 50 mg at 12/09/19 0261    sucralfate (CARAFATE) oral suspension 1,000 mg, 1,000 mg, Oral, BID, Cat Torres MD, 1,000 mg at 12/09/19 0615    theophylline (JEF-24) 24 hr capsule 200 mg, 200 mg, Oral, Daily, Chichi Lockett MD, 200 mg at 12/09/19 0903    tiotropium [facility]) capsule for inhaler 18 mcg, 18 mcg, Inhalation, Daily, Faheem Daniels MD, 18 mcg at 12/09/19 0904    traZODone (DESYREL) tablet 25 mg, 25 mg, Oral, HS PRN, Faheem Daniels MD  Justification if on more than two antipsychotics:  Only on his clozapine  Side effects if any:  None    Risks , benefits, side effects and precautions of medications discussed with patient and reminded patient to let us know any problems with medications     Objective:     Vital Signs:  Vitals:    12/08/19 1600 12/08/19 2000 12/09/19 0700 12/09/19 0705   BP: 93/63 98/64 91/55 91/52   BP Location: Left arm Left arm Left arm Left arm   Pulse: 74 91 70 62   Resp: 16 18 18 18   Temp: 98 2 °F (36 8 °C) 99 °F (37 2 °C) 99 8 °F (37 7 °C)    TempSrc: Temporal Temporal Temporal    SpO2: 92% 91%     Weight:       Height:         Appearance:  age appropriate, casually dressed and overweight older than stated age, friendly pleasant well kept with hair combed neatly and well dressed today found laying in bed   Behavior:  evasive and guarded and suspicious and preoccupied with psychosomatic complaints appearing anxious and cooperative polite and friendly when approached   Speech:  normal pitch and normal volume but circumstantial and tangential with stilted speech    Mood:  anxious and dysthymic   Affect:  mood-congruent, elated and entitled anxious and irritated and sad at times but pleasant today    Thought Process:  goal directed and illogical slightly pressured and tangential and talks as if in a court of law   Thought Content:  Still with fixed psychosomatic beliefss that she cannot breathe or cannot swallow or having  hypoglycemiaor having cancer and dying from it  Today she complains of feeling dizzy and things she may be anemic  No current suicidal homicidal thoughts intent or plans reported  No phobias obsessions or compulsions reported  Still accusatory  to certain staff      Perceptual Disturbances: None and does not appear responding to internal stimuli at times   Risk Potential: Tendency to not care for herself    Sensorium:  person, place, time/date, situation, day of week, month of year and time   Cognition:  grossly intact   Consciousness:  alert and awake    Attention: Intact concentration and attention span   Intellect: Considered to be at least of average intelligence   Insight:  limited and in denial of her illness   Judgment: poor      Motor Activity: no abnormal movements         Recent Labs:  Results Reviewed     None          I/O Past 24 hours:  No intake/output data recorded  No intake/output data recorded  Assessment / Plan:     Schizoaffective disorder, bipolar type ([facility] [address] )      Reason for continued inpatient care:  Because of underlying paranoia and refusal to go back to the personal care home and inability to care for herself on her own  Acceptance by patient:  Accepting  Gladys Vincent in recovery:  About living in another personal care home once she feels better  Understanding of medications :  Has some understanding  Involved in reintegration process:  Adjusting to the unit  Trusting in relatoinship with psychiatrist:  Trusting    Recommended Treatment:    Medication changes:  1) none today  Non-pharmacological treatments  1) continue with  individual therapy group therapy, milieu therapy and occupational therapy and milieu therapy involving multidisciplinary team approach with psychotherapist, case management, nursing, peer support services, art therapist etc using recovery principles and psycho-education about accepting illness and need for treatment   2) encourage to cocoperate with behavior plan  3) encourage to attend to ADLs like taking showers and wearing clean clothes   4) Encourage to attend groups   Safety  1) Safety/communication plan established targeting dynamic risk factors above    Discharge Plan most likely back to the a:  Personal care boarding home with act services once her delusions are managed and mood becomes more stabilized and she attends to her ADLs she becomes more receptive to treatment compliance but she has not shown any improvement in the last 5 months since she has been on the unit and hence the referral needs to be initiated for the  [address] [place] [address] / Coordination of Care    Total floor / unit time spent today 15 minutes  Greater than 50% of total time was spent with the patient and / or family counseling and / or coordination of care  A description of the counseling / coordination of care  Patient's Rights, confidentiality and exceptions to confidentiality, use of automated medical record, [facility] staff access to medical record, and consent to treatment reviewed      Roberto Colorado MD

## 2019-12-09 NOTE — ASSESSMENT & PLAN NOTE
Psychiatry Progress Note  Patient is still reporting that she is attending IMR groups and is friendly take another shower later today  She  is still psychosomatic claiming that she cannot swallow or has low blood sugar or she is going to die if she takes a shower and also believes that people are tampering with her medications or with the oxygen concentrator all her spirometer and claims that she was not feeling well last night  And none of these beliefss have not changed much   She was again told that the expectation is that she should take showers twice a week at least or at the least once a week and reminded that without showers she will not be welcome to come to team tomorrow   No signs or sxs of agranulocytosis or myocarditis or endocarditis and she is moving her bowels so far with no issues      She has been sleeping well and has been still using the oxygen concentrator at night and reports that she is happy to hear that she can go back to the  personal care home she came from if she keeps up with her improvement or face transfer to the [facility]   Current medications:    Current Facility-Administered Medications:     acetaminophen (TYLENOL) tablet 650 mg, 650 mg, Oral, Q6H PRN, Krystyna Mcclain MD    albuterol (PROVENTIL HFA,VENTOLIN HFA) inhaler 2 puff, 2 puff, Inhalation, Q4H PRN, Krystyna Mcclain MD, 2 puff at 10/11/19 0424    aluminum-magnesium hydroxide-simethicone (MYLANTA) 200-200-20 mg/5 mL oral suspension 15 mL, 15 mL, Oral, Q4H PRN, Krystyna Mcclain MD    ammonium lactate (LAC-HYDRIN) 12 % lotion 1 application, 1 application, Topical, BID PRN, Krystyna Mcclain MD    benzonatate (TESSALON PERLES) capsule 100 mg, 100 mg, Oral, TID PRN, Krystyna Mcclain MD    benztropine (COGENTIN) injection 1 mg, 1 mg, Intramuscular, Q8H PRN, Krystyna Mcclain MD    cloZAPine (CLOZARIL) tablet 25 mg, 25 mg, Oral, BID, Krystyna Mcclain MD, 25 mg at 12/08/19 2128    cloZAPine (CLOZARIL) tablet 50 mg, 50 mg, Oral, BID, Krystyna Mcclain MD, 50 mg at 12/08/19 2129    EPINEPHrine PF (ADRENALIN) 1 mg/mL injection 0 15 mg, 0 15 mg, Intramuscular, Once PRN, Manuel Copeland MD    fluticasone-vilanterol (BREO ELLIPTA) 200-25 MCG/INH inhaler 1 puff, 1 puff, Inhalation, Daily, Manuel Copeland MD, 1 puff at 12/09/19 0903    ketotifen (ZADITOR) 0 025 % ophthalmic solution 1 drop, 1 drop, Right Eye, BID PRN, Manuel Copeland MD    levothyroxine tablet 125 mcg, 125 mcg, Oral, Early Morning, Manuel Copeland MD, 125 mcg at 12/09/19 0546    magnesium hydroxide (MILK OF MAGNESIA) 400 mg/5 mL oral suspension 30 mL, 30 mL, Oral, Daily PRN, Manuel Copeland MD    montelukast (SINGULAIR) tablet 10 mg, 10 mg, Oral, HS, Manuel Copeland MD, 10 mg at 11/12/19 2141    OLANZapine (ZyPREXA) IM injection 5 mg, 5 mg, Intramuscular, Q8H PRN, Manuel Copeland MD    OLANZapine (ZyPREXA) tablet 5 mg, 5 mg, Oral, Q8H PRN, Manuel Copeland MD    ondansetron (ZOFRAN-ODT) dispersible tablet 4 mg, 4 mg, Oral, Q6H PRN, Manuel Copeland MD, 4 mg at 11/09/19 1223    pantoprazole (PROTONIX) EC tablet 40 mg, 40 mg, Oral, Early Morning, Maneul Copeland MD, 40 mg at 12/09/19 0546    polyethylene glycol (MIRALAX) packet 17 g, 17 g, Oral, Daily PRN, Manuel Copeland MD    polyvinyl alcohol (LIQUIFILM TEARS) 1 4 % ophthalmic solution 1 drop, 1 drop, Both Eyes, Q3H PRN, Manuel Copeland MD    sertraline (ZOLOFT) tablet 50 mg, 50 mg, Oral, BID, Manuel Copeland MD, 50 mg at 12/09/19 6802    sucralfate (CARAFATE) oral suspension 1,000 mg, 1,000 mg, Oral, BID, Cat Torres MD, 1,000 mg at 12/09/19 0615    theophylline (JEF-24) 24 hr capsule 200 mg, 200 mg, Oral, Daily, Manuel Copeland MD, 200 mg at 12/09/19 0903    tiotropium (SPIRIVA) capsule for inhaler 18 mcg, 18 mcg, Inhalation, Daily, Manuel Copeland MD, 18 mcg at 12/09/19 0904    traZODone (DESYREL) tablet 25 mg, 25 mg, Oral, HS PRN, Manuel Copeland MD  Justification if on more than two antipsychotics:  Only on his clozapine  Side effects if any:  None    Risks , benefits, side effects and precautions of medications discussed with patient and reminded patient to let us know any problems with medications     Objective:     Vital Signs:  Vitals:    12/08/19 1600 12/08/19 2000 12/09/19 0700 12/09/19 0705   BP: 93/63 98/64 91/55 91/52   BP Location: Left arm Left arm Left arm Left arm   Pulse: 74 91 70 62   Resp: 16 18 18 18   Temp: 98 2 °F (36 8 °C) 99 °F (37 2 °C) 99 8 °F (37 7 °C)    TempSrc: Temporal Temporal Temporal    SpO2: 92% 91%     Weight:       Height:         Appearance:  age appropriate, casually dressed and overweight older than stated age, friendly pleasant well kept with hair combed neatly and well dressed today found laying in bed   Behavior:  evasive and guarded and suspicious and preoccupied with psychosomatic complaints appearing anxious and cooperative polite and friendly when approached   Speech:  normal pitch and normal volume but circumstantial and tangential with stilted speech    Mood:  anxious and dysthymic   Affect:  mood-congruent, elated and entitled anxious and irritated and sad at times but pleasant today    Thought Process:  goal directed and illogical slightly pressured and tangential and talks as if in a court of law   Thought Content:  Still with fixed psychosomatic beliefss that she cannot breathe or cannot swallow or having  hypoglycemiaor having cancer and dying from it  Today she complains of feeling dizzy and things she may be anemic  No current suicidal homicidal thoughts intent or plans reported  No phobias obsessions or compulsions reported  Still accusatory  to certain staff      Perceptual Disturbances: None and does not appear responding to internal stimuli at times   Risk Potential: Tendency to not care for herself    Sensorium:  person, place, time/date, situation, day of week, month of year and time   Cognition:  grossly intact   Consciousness:  alert and awake    Attention: Intact concentration and attention span   Intellect: Considered to be at least of average intelligence   Insight:  limited and in denial of her illness   Judgment: poor      Motor Activity: no abnormal movements         Recent Labs:  Results Reviewed     None          I/O Past 24 hours:  No intake/output data recorded  No intake/output data recorded  Assessment / Plan:     Schizoaffective disorder, bipolar type ([facility] [address] )      Reason for continued inpatient care:  Because of underlying paranoia and refusal to go back to the personal care home and inability to care for herself on her own  Acceptance by patient:  Accepting  Jabari Marie in recovery:  About living in another personal care home once she feels better  Understanding of medications :  Has some understanding  Involved in reintegration process:  Adjusting to the unit  Trusting in relatoinship with psychiatrist:  Trusting    Recommended Treatment:    Medication changes:  1) none today  Non-pharmacological treatments  1) continue with  individual therapy group therapy, milieu therapy and occupational therapy and milieu therapy involving multidisciplinary team approach with psychotherapist, case management, nursing, peer support services, art therapist etc using recovery principles and psycho-education about accepting illness and need for treatment   2) encourage to cocoperate with behavior plan  3) encourage to attend to ADLs like taking showers and wearing clean clothes   4) Encourage to attend groups   Safety  1) Safety/communication plan established targeting dynamic risk factors above    Discharge Plan most likely back to the a:  Personal care boarding home with act services once her delusions are managed and mood becomes more stabilized and she attends to her ADLs she becomes more receptive to treatment compliance but she has not shown any improvement in the last 5 months since she has been on the unit and hence the referral needs to be initiated for the  [address] [place] [address] / Coordination of Care    Total floor / unit time spent today 15 minutes  Greater than 50% of total time was spent with the patient and / or family counseling and / or coordination of care  A description of the counseling / coordination of care  Patient's Rights, confidentiality and exceptions to confidentiality, use of automated medical record, [facility] staff access to medical record, and consent to treatment reviewed      Shi Pham MD

## 2019-12-09 NOTE — PLAN OF CARE
Problem: Nutrition/Hydration-ADULT  Goal: Nutrient/Hydration intake appropriate for improving, restoring or maintaining nutritional needs  Description  Monitor and assess patient's nutrition/hydration status for malnutrition  Collaborate with interdisciplinary team and initiate plan and interventions as ordered  Monitor patient's weight and dietary intake as ordered or per policy  Utilize nutrition screening tool and intervene as necessary  Determine patient's food preferences and provide high-protein, high-caloric foods as appropriate  INTERVENTIONS:  - Monitor oral intake, urinary output, labs, and treatment plans  - Assess nutrition and hydration status and recommend course of action  - Evaluate amount of meals eaten  - Assist patient with eating if necessary   - Allow adequate time for meals  - Recommend/ encourage appropriate diets, oral nutritional supplements, and vitamin/mineral supplements  - Order, calculate, and assess calorie counts as needed  - Recommend, monitor, and adjust tube feedings and TPN/PPN based on assessed needs  - Assess need for intravenous fluids  - Provide specific nutrition/hydration education as appropriate  - Include patient/family/caregiver in decisions related to nutrition  Outcome: Progressing   Encourage family to bring in food, physical activity and po intake to overall help with meeting estimated needs  If PT not showing po improvement or begins to decline, add ensure enlive to assist with meeting estimated needs

## 2019-12-09 NOTE — PLAN OF CARE
Problem: Ineffective Coping  Goal: Participates in unit activities  Description  Interventions:  - Provide therapeutic environment   - Provide required programming   - Redirect inappropriate behaviors   Outcome: Progressing   Patient has shown some improvement since the last week  Patient has attended IMR group twice last week and has brought up her percentage to 25% attendance  Patient is also participating more and is able to focus more on topics of recovery

## 2019-12-09 NOTE — PROGRESS NOTES
12/06/19 1100   Activity/Group Checklist   Group Other (Comment)  (IMR - Weekly Goal Review/Group Processing)   Attendance Attended   Attendance Duration (min) 46-60   Interactions Interacted appropriately   Affect/Mood Appropriate   Goals Achieved Able to listen to others; Able to engage in interactions; Able to reflect/comment on own behavior;Able to self-disclose; Able to recieve feedback; Able to manage/cope with feelings     Patient attended [facility] this morning focused on Weekly Goal Review  In addition to weekly goal review, patients were asked for some of their overall goals for the future  Patient reports that her main goal at this time is to attend groups  She was acknowledged for her efforts this week  Patients were also given the opportunity to process their feelings/fears about their peer who has impacted the milieu environment over the past couple of days  Patient participated and was respectful of peers

## 2019-12-09 NOTE — PROGRESS NOTES
12/09/19 0900   Team Meeting   Meeting Type Daily Rounds   Team Members Present   Team Members Present Physician;Nurse;; Other (Discipline and Name)   Physician Team Member Dr Tyra Herrera Team Member Surinder Brewster RN   Care Management Team Member Brenda Irwin   Other (Discipline and Name) Lv Pitts LCSW     Patient presents slightly less somatic and more visible on the unit  Slept

## 2019-12-09 NOTE — PROGRESS NOTES
12/09/19 1100   Activity/Group Checklist   Group   (IMR/Competencies of Emotional Intelligence )   Attendance Attended   Attendance Duration (min) 46-60   Interactions Interacted appropriately   Affect/Mood Appropriate;Normal range   Goals Achieved Identified feelings; Able to engage in interactions; Able to listen to others; Identified resources and support systems; Identified distorted thoughts/beliefs; Able to reflect/comment on own behavior;Able to self-disclose

## 2019-12-09 NOTE — PLAN OF CARE

## 2019-12-09 NOTE — PLAN OF CARE
Problem: Depression  Goal: Treatment Goal: Demonstrate behavioral control of depressive symptoms, verbalize feelings of improved mood/affect, and adopt new coping skills prior to discharge  Outcome: Progressing     Problem: Anxiety  Goal: Anxiety is at manageable level  Description  Interventions:  - Assess and monitor patient's anxiety level  - Monitor for signs and symptoms of anxiety both physical and emotional (heart palpitations, chest pain, shortness of breath, headaches, nausea, feeling jumpy, restlessness, irritable, apprehensive)  - Collaborate with interdisciplinary team and initiate plan and interventions as ordered    - [place] patient to unit/surroundings  - Explain treatment plan  - Encourage participation in care  - Encourage verbalization of concerns/fears  - Identify coping mechanisms  - Assist in developing anxiety-reducing skills  - Administer/offer alternative therapies  - Limit or eliminate stimulants  Outcome: Progressing     Problem: Alteration in Orientation  Goal: Interact with staff daily  Description  Interventions:  - Assess and re-assess patient's level of orientation  - Engage patient in 1 on 1 interactions, daily, for a minimum of 15 minutes   - Establish rapport/trust with patient   Outcome: Progressing     Problem: PAIN - ADULT  Goal: Verbalizes/displays adequate comfort level or baseline comfort level  Description  Interventions:  - Encourage patient to monitor pain and request assistance  - Assess pain using appropriate pain scale  - Administer analgesics based on type and severity of pain and evaluate response  - Implement non-pharmacological measures as appropriate and evaluate response  - Consider cultural and social influences on pain and pain management  - Notify physician/advanced practitioner if interventions unsuccessful or patient reports new pain  Outcome: Progressing     Problem: SAFETY ADULT  Goal: Patient will remain free of falls  Description  INTERVENTIONS:  - Assess patient frequently for physical needs  -  Identify cognitive and physical deficits and behaviors that affect risk of falls  -  [place] fall precautions as indicated by assessment   - Educate patient/family on patient safety including physical limitations  - Instruct patient to call for assistance with activity based on assessment  - Modify environment to reduce risk of injury  - Consider OT/PT consult to assist with strengthening/mobility  Outcome: Progressing     Problem: RESPIRATORY - ADULT  Goal: Achieves optimal ventilation and oxygenation  Description  INTERVENTIONS:  - Assess for changes in respiratory status  - Assess for changes in mentation and behavior  - Position to facilitate oxygenation and minimize respiratory effort  - Oxygen administration by appropriate delivery method based on oxygen saturation (per order) or ABGs  - Initiate smoking cessation education as indicated  - Encourage broncho-pulmonary hygiene including cough, deep breathe, Incentive Spirometry  - Assess the need for suctioning and aspirate as needed  - Assess and instruct to report SOB or any respiratory difficulty  - Respiratory Therapy support as indicated  Outcome: Progressing     Problem: SLEEP DISTURBANCE  Goal: Will exhibit normal sleeping pattern  Description  Interventions:  -  Assess the patients sleep pattern, noting recent changes  - Administer medication as ordered  - Decrease environmental stimuli, including noise, as appropriate during the night  - Encourage the patient to actively participate in unit groups and or exercise during the day to enhance ability to achieve adequate sleep at night  - Assess the patient, in the morning, encouraging a description of sleep experience  Outcome: Progressing   Patient remains alert and oriented per her baseline  Patient appeared to be asleep throughout the night  Around 0320, pt c/o not feeling well; VS as follow 144/74,90,19,98 3,94% on 1 L   At [phone] pt requested that blood sugar be taken,  was noted  A glass of water was provided to pt, and patient went back to sleep  Will continue to monitor on q 15 minutes checks

## 2019-12-09 NOTE — PLAN OF CARE
Problem: Alteration in Thoughts and Perception  Goal: Agree to be compliant with medication regime, as prescribed and report medication side effects  Description  Interventions:  - Offer appropriate PRN medication and supervise ingestion; conduct aims, as needed   Outcome: Progressing     Problem: Alteration in Thoughts and Perception  Goal: Recognize dysfunctional thoughts, communicate reality-based thoughts at the time of discharge  Description  Interventions:  - Provide medication and psycho-education to assist patient in compliance and developing insight into his/her illness   Outcome: Not Progressing  Goal: Complete daily ADLs, including personal hygiene independently, as able  Description  Interventions:  - Observe, teach, and assist patient with ADLS  - Monitor and promote a balance of rest/activity, with adequate nutrition and elimination   Outcome: Not Progressing   Patient continues to be isolative and withdrawn  Remains somatic about not being able to swallow and about her blood sugars  Ate 10% of her breakfast this morning which was a yogurt and all of her drinks  Attended and participated in [employer] [place] group this shift  She refused to eat lunch  She continues to lack motivation and insight  Remains in bed sleeping on and off today  Continue to monitor

## 2019-12-09 NOTE — PROGRESS NOTES
Progress Note    Nolberto Galvin 60 y o  female MRN: [id number]  Unit/Bed#: [facility] [address] Encounter: [id number]  Admitting Physician: Jia West MD  PCP: Aspen Metzger PA-C  Date of Admission:  2019  5:30 PM    Assessment and Plan    COPD with asthma [facility])  Assessment & Plan  Currently asymtomatic  Patient denies symptoms of SOB, chest pain, or chest tightness    Cont w/ daily inhalers: Breo Ellipta QD  Theophylline 200 mg QD  Albuterol, PRN    Ear ache  Assessment & Plan  Left ear ache of 1 day duration  No sign of infection, afebrile, no chills  No tenderness on external palpation, no hearing loss or decrease hearing     Will order debrox   Will continue to monitor     Gastroesophageal reflux disease without esophagitis  Assessment & Plan  Controlled  Cont w/ Protonix, 40 mg QD    Acquired hypothyroidism  Assessment & Plan  Controlled  Last TSH WNL  Cont w/ Levothyroxine, 125 mcg, QD  * Schizoaffective disorder, bipolar type ([facility] [address] )  Assessment & Plan  Mgmt per Psychiatry      VTE Pharmacologic Prophylaxis:   Pharmacologic: patient is ambulating   Mechanical VTE Prophylaxis in Place: No    Patient Centered Rounds: I have performed bedside rounds with nursing staff today  Discussions with Specialists or Other Care Team Provider: none    Education and Discussions with Family / Patient: patient     Time Spent for Care: 15 minutes  More than 50% of total time spent on counseling and coordination of care as described above  Current Length of Stay: 139 day(s)    Current Patient Status: Psych - Long Term       Discharge Plan: Per primary team     Code Status: Level 1 - Full Code      Subjective:   Patient was seen and examined at bedside  Patient states that she was feeling well with no complaint  Except for a minor earhace on her left ear that started yesterday  She denies chest pain, SOB, chest tightness or any other symptoms       Objective:     Vitals:   Temp (24hrs), Av 3 °F (37 4 °C), Min:99 °F (37 2 °C), Max:99 8 °F (37 7 °C)    Temp:  [99 °F (37 2 °C)-99 8 °F (37 7 °C)] 99 °F (37 2 °C)  HR:  [61-91] 61  Resp:  [16-18] 16  BP: ()/(52-84) 102/84  SpO2:  [89 %-91 %] 89 %  Body mass index is 24 82 kg/m²  Input and Output Summary (last 24 hours):     No intake or output data in the 24 hours ending 12/09/19 1634    Physical Exam:     Physical Exam   Constitutional: She is oriented to person, place, and time  She appears well-developed and well-nourished  No distress  HENT:   Head: Normocephalic and atraumatic  Eyes: Conjunctivae and EOM are normal  No scleral icterus  Neck: Neck supple  Cardiovascular: Normal rate and normal heart sounds  No murmur heard  Pulmonary/Chest: Effort normal and breath sounds normal  No respiratory distress  She has no rales  She exhibits no tenderness  Abdominal: Soft  Bowel sounds are normal  She exhibits no mass  There is no tenderness  Musculoskeletal: Normal range of motion  She exhibits no deformity  Neurological: She is alert and oriented to person, place, and time  No cranial nerve deficit  Skin: Skin is warm and dry  No rash noted  Psychiatric: She has a normal mood and affect  Vitals reviewed  Additional Data:     Labs:    Results from last 7 days   Lab Units 12/06/19  0746   WBC Thousand/uL 9 10   HEMOGLOBIN g/dL 14 5   HEMATOCRIT % 43 3   PLATELETS Thousands/uL 255   NEUTROS PCT % 58   LYMPHS PCT % 31   MONOS PCT % 9   EOS PCT % 2           Invalid input(s): LABALBU      Results from last 7 days   Lab Units 12/09/19  0440   POC GLUCOSE mg/dl 142*             * I Have Reviewed All Lab Data Listed Above  * Additional Pertinent Lab Tests Reviewed:  All Labs Within Last 24 Hours Reviewed    Recent Cultures (last 7 days):           Last 24 Hours Medication List:     Current Facility-Administered Medications:  acetaminophen 650 mg Oral Q6H PRN Jill Gayle MD   albuterol 2 puff Inhalation Q4H PRN Jill Gayle MD aluminum-magnesium hydroxide-simethicone 15 mL Oral Q4H PRN Zac Sierra MD   ammonium lactate 1 application Topical BID PRN Zac Sierra MD   benzonatate 100 mg Oral TID PRN Zac Sierra MD   benztropine 1 mg Intramuscular Q8H PRN Zac Sierra MD   carbamide peroxide 5 drop Left Ear BID Rona Roy MD   cloZAPine 25 mg Oral BID Zac Sierra MD   cloZAPine 50 mg Oral BID Zac Sierra MD   EPINEPHrine PF 0 15 mg Intramuscular Once PRN Zac Sierra MD   fluticasone-vilanterol 1 puff Inhalation Daily Zac Sierra MD   ketotifen 1 drop Right Eye BID PRN Zac Sierra MD   levothyroxine 125 mcg Oral Early Morning Zac Sierra MD   magnesium hydroxide 30 mL Oral Daily PRN Zac Sierra MD   montelukast 10 mg Oral HS Zac Sierra MD   OLANZapine 5 mg Intramuscular Q8H PRN Zac Sierra MD   OLANZapine 5 mg Oral Q8H PRN Zac Sierra MD   ondansetron 4 mg Oral Q6H PRN Zac Sierra MD   pantoprazole 40 mg Oral Early Morning Zac Sierra MD   polyethylene glycol 17 g Oral Daily PRN Zac Sierra MD   polyvinyl alcohol 1 drop Both Eyes Q3H PRN Zac Sierra MD   sertraline 50 mg Oral BID Zac Sierra MD   sucralfate 1,000 mg Oral BID Yani Toribio MD   theophylline 200 mg Oral Daily Zac Sierra MD   tiotropium 18 mcg Inhalation Daily Zac Sierra MD   traZODone 25 mg Oral HS PRN Zac Sierra MD        ** Please Note: Dictation voice to text software may have been used in the creation of this document   **    Gatito Ceballos MD  12/09/19  4:34 PM

## 2019-12-10 NOTE — PROGRESS NOTES
12/10/19 0900   Team Meeting   Meeting Type Daily Rounds   Team Members Present   Team Members Present Physician;Nurse;; Other (Discipline and Name)   Physician Team Member Dr Shannan Higginbotham Team Member Bonilla Domingo RN   Care Management Team Member Brenda Ellington   Other (Discipline and Name) Rhianna Beckman LCSW     Patient attended [facility] group and 3-11 groups  Slightly improved and more visible  Slept

## 2019-12-10 NOTE — QUICK NOTE
Was notified by nurse that patient had 1 episode of hemoptysis after coughing all night  Patient coughed up in a couple was visualized by both nurse and myself  Patient had a similar episode back in August which was worked up and found to have a negative chest x-ray as well as negative QuantiFERON    -two-view chest x-ray stat  -consult with pulmonology  - at this time awaiting recommendation from pulmonology

## 2019-12-10 NOTE — PROGRESS NOTES
Patient was escorted by staff and transport to the xray dept  The pa and lateral chest xray was completed  No issues or concerns noted  Patient returned to the unit with no issue  Continue to monitor

## 2019-12-10 NOTE — PROGRESS NOTES
12/10/19 1100   Activity/Group Checklist   Group Other (Comment)  (Pet therapy and IMR)   Attendance Attended   Attendance Duration (min) 46-60   Interactions Interacted appropriately   Affect/Mood Appropriate   Goals Achieved Identified feelings; Identified triggers; Discussed coping strategies; Identified resources and support systems; Able to listen to others; Able to engage in interactions; Able to reflect/comment on own behavior;Able to self-disclose; Able to recieve feedback     Chuy Hahn attended group today  Patient was respectful of dog and the   Patient did well with participating and identifying her coping strategies  She explained that going to groups has been helping her with handling and coping with stress  Patient stated that meditating and talking to peers are ways that she has been handling stress  Patient was able to engage in the discussion of good stress vs  bad stress and ways to deal with stress  Patient was interactive and respectful of peers during group

## 2019-12-10 NOTE — CONSULTS
Pulmonary Consultation   Lizbeth Jhaveri 60 y o  female MRN: [id number]   [facility] [id number] Encounter: [id number]      Reason for consultation:   Hemoptysis      Requesting physician:   Deanna Bai MD      Impressions:    Hemoptysis  Only 1 episode  Not concerning   Right lower lobe abnormality on the chest x-ray  This could be due to recurrent aspiration even though the patient is denying symptoms of choking  There is also a pleural thickening  She has no symptoms or signs of active pneumonia   Chronic obstructive pulmonary disease of undetermined severity   Schizoaffective disorder  Recommendations:   CT scan of the chest without IV contrast    May consider speech evaluation for swallowing to rule out recurrent aspiration   If the hemoptysis recurs will consider bronchoscopy   Breo 200/25, 1 inhalation once a day   Spiriva once a day  History of Present Illness   HPI:  Lizbeth Jhaveri is a 60 y o  female who has been admitted to the psych unit since August for schizoaffective disorder  She has a long history of chronic obstructive pulmonary disease  She was seen in the past in our office at [facility]  She is treated with a Breo and Spiriva  At 1 point she was on home oxygen  For the most part the patient has been doing fairly well  This morning she had 1 episode of hemoptysis  She coughed and brought up mucoid sputum which mixed with blood  No further hemoptysis is after that  The patient denies fever, chills or chest pain  She denies wheezing or chest tightness  She denies choking on her food  Review of systems:  12 point review of systems was completed and was otherwise negative except as listed in HPI        Historical Information   Past Medical History:   Diagnosis Date    Acid reflux     Anxiety     Asthma     Bipolar 1 disorder (HCC)     Chronic pain disorder     Chronic respiratory failure (HCC)     Compression fracture of fourth lumbar vertebra (HCC)     COPD (chronic obstructive pulmonary disease) (HCC)     Depression     GERD (gastroesophageal reflux disease)     History of home oxygen therapy     Hypothyroidism     Lipoma of upper extremity     Psychiatric illness     [facility]ffective disorder ([facility] [address] )     Substance abuse ([facility] )     Nicotine    [facility]acic compression fracture ([facility])     Ventral hernia      History reviewed  No pertinent surgical history  Family History   Problem Relation Age of Onset    Arthritis Mother        Family History:  No family history of chronic lung disease  Social History:  The patient was living in a group home before she was admitted to the psych unit  No pets  She has an extensive smoking history        Meds/Allergies   Current Facility-Administered Medications   Medication Dose Route Frequency    acetaminophen (TYLENOL) tablet 650 mg  650 mg Oral Q6H PRN    albuterol (PROVENTIL HFA,VENTOLIN HFA) inhaler 2 puff  2 puff Inhalation Q4H PRN    aluminum-magnesium hydroxide-simethicone (MYLANTA) 200-200-20 mg/5 mL oral suspension 15 mL  15 mL Oral Q4H PRN    ammonium lactate (LAC-HYDRIN) 12 % lotion 1 application  1 application Topical BID PRN    benzonatate (TESSALON PERLES) capsule 100 mg  100 mg Oral TID PRN    benztropine (COGENTIN) injection 1 mg  1 mg Intramuscular Q8H PRN    carbamide peroxide (DEBROX) 6 5 % otic solution 5 drop  5 drop Left Ear BID    cloZAPine (CLOZARIL) tablet 25 mg  25 mg Oral BID    cloZAPine (CLOZARIL) tablet 50 mg  50 mg Oral BID    EPINEPHrine PF (ADRENALIN) 1 mg/mL injection 0 15 mg  0 15 mg Intramuscular Once PRN    fluticasone-vilanterol (BREO ELLIPTA) 200-25 MCG/INH inhaler 1 puff  1 puff Inhalation Daily    ketotifen (ZADITOR) 0 025 % ophthalmic solution 1 drop  1 drop Right Eye BID PRN    levothyroxine tablet 125 mcg  125 mcg Oral Early Morning    magnesium hydroxide (MILK OF MAGNESIA) 400 mg/5 mL oral suspension 30 mL  30 mL Oral Daily PRN    montelukast (SINGULAIR) tablet 10 mg  10 mg Oral HS    OLANZapine (ZyPREXA) IM injection 5 mg  5 mg Intramuscular Q8H PRN    OLANZapine (ZyPREXA) tablet 5 mg  5 mg Oral Q8H PRN    ondansetron (ZOFRAN-ODT) dispersible tablet 4 mg  4 mg Oral Q6H PRN    pantoprazole (PROTONIX) EC tablet 40 mg  40 mg Oral Early Morning    polyethylene glycol (MIRALAX) packet 17 g  17 g Oral Daily PRN    polyvinyl alcohol (LIQUIFILM TEARS) 1 4 % ophthalmic solution 1 drop  1 drop Both Eyes Q3H PRN    sertraline (ZOLOFT) tablet 50 mg  50 mg Oral BID    sucralfate (CARAFATE) oral suspension 1,000 mg  1,000 mg Oral BID    theophylline (JEF-24) 24 hr capsule 200 mg  200 mg Oral Daily    tiotropium (SPIRIVA) capsule for inhaler 18 mcg  18 mcg Inhalation Daily    traZODone (DESYREL) tablet 25 mg  25 mg Oral HS PRN     Medications Prior to Admission   Medication    acetaminophen (TYLENOL) 325 mg tablet    albuterol (PROVENTIL HFA,VENTOLIN HFA) 90 mcg/act inhaler    cloZAPine (CLOZARIL) 25 mg tablet    EPINEPHrine (EPIPEN JR) 0 15 mg/0 3 mL SOAJ    FLUoxetine (PROzac) 10 mg capsule    levothyroxine 125 mcg tablet    OXYGEN-HELIUM IN    pantoprazole (PROTONIX) 40 mg tablet    theophylline (JEF-24) 200 MG 24 hr capsule     Allergies   Allergen Reactions    Peanut-Containing Drug Products Anaphylaxis    Shellfish-Derived Products Anaphylaxis    Antihistamines, Chlorpheniramine-Type     Aspirin     Atrovent [Ipratropium]     Elavil [Amitriptyline]     Iodine      Other reaction(s): Unknown Reaction    Levaquin [Levofloxacin]     Novocain [Procaine]     Penicillins      Able to tolerate azithromycin and vancomycin    Prolixin [Fluphenazine]     Sulfa Antibiotics Other (See Comments)     Unknown Zithromax-Tight Throat    Zithromax [Azithromycin] Throat Swelling     Tight throat       Vitals: Blood pressure 97/56, pulse 68, temperature 97 9 °F (36 6 °C), temperature source Temporal, resp   rate 18, height 5' 4" (1 626 m), weight 65 6 kg (144 lb 9 6 oz), SpO2 (!) 86 %, not currently breastfeeding ,    No intake or output data in the 24 hours ending 12/10/19 1254    Physical exam:        Head/eyes:    Normocephalic, without obvious abnormality, atraumatic,         PERRL, extraocular muscles intact, no scleral icterus    Nose:   Nares normal, septum midline, mucosa normal, no drainage    or sinus tenderness   Throat:   Moist mucous membranes, no thrush   Neck:   Supple, trachea midline, no adenopathy; no carotid    bruit or JVD   Lungs:     Decreased breath sounds  No wheezing or rhonchi  Heart:    Regular rate and rhythm, S1 and S2 normal, no murmur, rub   or gallop   Abdomen:     Soft, non-tender, bowel sounds active all four quadrants,     no masses, no organomegaly   Extremities:   Extremities normal, atraumatic, no cyanosis or edema   Skin:   Warm, dry, turgor normal, no rashes or lesions   Neurologic:   CNII-XII intact, normal strength, non-focal             Labs:  Lab Results   Component Value Date    WBC 9 10 12/06/2019    HGB 14 5 12/06/2019    HCT 43 3 12/06/2019    MCV 92 12/06/2019     12/06/2019     Lab Results   Component Value Date    SODIUM 140 10/25/2019    K 4 1 10/25/2019     10/25/2019    CO2 29 10/25/2019    BUN 16 10/25/2019    CREATININE 0 75 10/25/2019    GLUC 93 10/25/2019    CALCIUM 9 1 10/25/2019       Imaging and other studies: I have personally reviewed pertinent films in PACS I have reviewed the chest x-ray on the [facility] system and compared to the prior chest x-ray from August   She has more prominent right lower lobe airspace disease  Also pleural based dense opacity which is most likely due to pleural thickening          Raymond Gutiérrez MD

## 2019-12-10 NOTE — PLAN OF CARE
Problem: Alteration in Thoughts and Perception  Goal: Verbalize thoughts and feelings  Description  Interventions:  - Promote a nonjudgmental and trusting relationship with the patient through active listening and therapeutic communication  - Assess patient's level of functioning, behavior and potential for risk  - Engage patient in 1 on 1 interactions for a minimum of 15 minutes each session  - Encourage patient to express fears, feelings, frustrations, and discuss symptoms    - [place] patient to reality, help patient recognize reality-based thinking   - Administer medications as ordered and assess for potential side effects  - Provide the patient education related to the signs and symptoms of the illness and desired effects of prescribed medications  Outcome: Progressing  Goal: Agree to be compliant with medication regime, as prescribed and report medication side effects  Description  Interventions:  - Offer appropriate PRN medication and supervise ingestion; conduct aims, as needed   Outcome: Progressing  Goal: Attend and participate in unit activities, including therapeutic, recreational, and educational groups  Description  Interventions:  - Provide therapeutic and educational activities daily, encourage attendance and participation, and document same in the medical record     CERTIFIED PEER SPECIALIST INTERVENTIONS:    Complete peer assessment with patient to assess their needs and identify their goals to complete while in the recovery program as well as once discharged into the community  Patient will complete WRAP Plan, Crisis Plan and 5 Life Domains  Patient will attend 50% of groups offered on the unit  Patient will complete a goal card weekly      Outcome: Progressing     Problem: Depression  Goal: Refrain from isolation  Description  Interventions:  - Develop a trusting relationship   - Encourage socialization   Outcome: Progressing     Problem: RESPIRATORY - ADULT  Goal: Achieves optimal ventilation and oxygenation  Description  INTERVENTIONS:  - Assess for changes in respiratory status  - Assess for changes in mentation and behavior  - Position to facilitate oxygenation and minimize respiratory effort  - Oxygen administration by appropriate delivery method based on oxygen saturation (per order) or ABGs  - Initiate smoking cessation education as indicated  - Encourage broncho-pulmonary hygiene including cough, deep breathe, Incentive Spirometry  - Assess the need for suctioning and aspirate as needed  - Assess and instruct to report SOB or any respiratory difficulty  - Respiratory Therapy support as indicated  Outcome: Progressing     Problem: Alteration in Thoughts and Perception  Goal: Complete daily ADLs, including personal hygiene independently, as able  Description  Interventions:  - Observe, teach, and assist patient with ADLS  - Monitor and promote a balance of rest/activity, with adequate nutrition and elimination   Outcome: Not Progressing     Problem: Nutrition/Hydration-ADULT  Goal: Nutrient/Hydration intake appropriate for improving, restoring or maintaining nutritional needs  Description  Monitor and assess patient's nutrition/hydration status for malnutrition  Collaborate with interdisciplinary team and initiate plan and interventions as ordered  Monitor patient's weight and dietary intake as ordered or per policy  Utilize nutrition screening tool and intervene as necessary  Determine patient's food preferences and provide high-protein, high-caloric foods as appropriate       INTERVENTIONS:  - Monitor oral intake, urinary output, labs, and treatment plans  - Assess nutrition and hydration status and recommend course of action  - Evaluate amount of meals eaten  - Assist patient with eating if necessary   - Allow adequate time for meals  - Recommend/ encourage appropriate diets, oral nutritional supplements, and vitamin/mineral supplements  - Order, calculate, and assess calorie counts as needed  - Recommend, monitor, and adjust tube feedings and TPN/PPN based on assessed needs  - Assess need for intravenous fluids  - Provide specific nutrition/hydration education as appropriate  - Include patient/family/caregiver in decisions related to nutrition  Outcome: Not Progressing     [address] Kat[address] Bowles has been visible this shift; sits out in [place] @ phone chair when it is not in use  She is feeling some unwell; was nauseous after meal & took Zofran 4mg SL @ 1757 for it w/relief  Also has c/o L ear bothering her; the De[employer] gtts started this evening  For meal had only fluids, 120ml juice, 120ml milk, 120ml ice cream  For HS snack had 1/2 bag potato chips & her daily Ensure  She used the [employer] achieving volumes of 1100ml  Is perplexed by O2 sats in upper 80's tonight as is not experiencing any respiratory symptoms  Was encouraged to drink fluids to keep secretions thin, be OOB, did comply  Took part in PM Group  Pleasant, has humor  Wistful about showering early in shift, but, concluded wasn't well enough tonight  Medicine compliant w/exception of Singulair  Wearing now her QHS humidified nasal O2 @ 1L for bed

## 2019-12-10 NOTE — PROGRESS NOTES
Progress Note - Violetta Niles 1955, 60 y o  female MRN: [id number]    Unit/Bed#: [facility] 110-02 Encounter: [id number]    Primary Care Provider: Praveen Barrios PA-C   Date and time admitted to hospital: 7/23/2019  5:30 PM        * Schizoaffective disorder, bipolar type [facility])  Assessment & Plan  Psychiatry Progress Note  Patient was seen by [place] [facility] yesterday and started on ensure once a day and today she complained of spitting  Blood and is being investigated for TB and is now wearing a mask till results are back    She  is still psychosomatic claiming that she cannot swallow or has low blood sugar or she is going to die if she takes a shower and also believes that people are tampering with her medications or with the oxygen concentrator all her spirometer and these are all fixed beliefs  She did attend about 25% of groups and did take a shower two nights ago  She was again told that the expectation is that she should take showers twice a week at least or at the least once a week and reminded that without showers she may not be allowed to be in the community opeers as peers were complaining about a bad odor from her in the past due to poor ADLs  No signs or sxs of agranulocytosis or myocarditis or endocarditis and she is moving her bowels so far with no issues   She has been sleeping well and has been still using the oxygen concentrator at night and reports that she is happy to hear that she can go back to the  personal care home she came from if she keeps up with her improvement or face transfer to the [facility] hospital Did not come and attend the team meeting today when called so I spoke toher at her bedside    Current medications:    Current Facility-Administered Medications:     acetaminophen (TYLENOL) tablet 650 mg, 650 mg, Oral, Q6H PRN, Felisa Florence MD    albuterol (PROVENTIL HFA,VENTOLIN HFA) inhaler 2 puff, 2 puff, Inhalation, Q4H PRN, Felisa Florence MD, 2 puff at 10/11/19 0424   aluminum-magnesium hydroxide-simethicone (MYLANTA) 200-200-20 mg/5 mL oral suspension 15 mL, 15 mL, Oral, Q4H PRN, Krystyna Mcclain MD    ammonium lactate (LAC-HYDRIN) 12 % lotion 1 application, 1 application, Topical, BID PRN, Krystyna Mcclain MD    benzonatate (TESSALON PERLES) capsule 100 mg, 100 mg, Oral, TID PRN, Krystyna Mcclain MD    benztropine (COGENTIN) injection 1 mg, 1 mg, Intramuscular, Q8H PRN, Krystyna Mcclain MD    carbamide peroxide (DEBROX) 6 5 % otic solution 5 drop, 5 drop, Left Ear, BID, Rona Richardson MD, 5 drop at 12/09/19 1821    cloZAPine (CLOZARIL) tablet 25 mg, 25 mg, Oral, BID, Krystyna Mcclain MD, 25 mg at 12/09/19 2120    cloZAPine (CLOZARIL) tablet 50 mg, 50 mg, Oral, BID, Krystyna Mcclain MD, 50 mg at 12/09/19 2120    EPINEPHrine PF (ADRENALIN) 1 mg/mL injection 0 15 mg, 0 15 mg, Intramuscular, Once PRN, Krystyna Mcclain MD    fluticasone-vilanterol (BREO ELLIPTA) 200-25 MCG/INH inhaler 1 puff, 1 puff, Inhalation, Daily, Krystyna Mcclain MD, 1 puff at 12/10/19 0826    ketotifen (ZADITOR) 0 025 % ophthalmic solution 1 drop, 1 drop, Right Eye, BID PRN, Krystyna Mcclain MD    levothyroxine tablet 125 mcg, 125 mcg, Oral, Early Morning, Krystyna Mcclain MD, 125 mcg at 12/10/19 0555    magnesium hydroxide (MILK OF MAGNESIA) 400 mg/5 mL oral suspension 30 mL, 30 mL, Oral, Daily PRN, Krystyna Mcclain MD    montelukast (SINGULAIR) tablet 10 mg, 10 mg, Oral, HS, Krystyna Mcclain MD, 10 mg at 11/12/19 2141    OLANZapine (ZyPREXA) IM injection 5 mg, 5 mg, Intramuscular, Q8H PRN, Krystyna Mcclain MD    OLANZapine (ZyPREXA) tablet 5 mg, 5 mg, Oral, Q8H PRN, Krystyna Mcclain MD    ondansetron (ZOFRAN-ODT) dispersible tablet 4 mg, 4 mg, Oral, Q6H PRN, Krystyna Mcclain MD, 4 mg at 12/09/19 1757    pantoprazole (PROTONIX) EC tablet 40 mg, 40 mg, Oral, Early Morning, Krystyna Mcclain MD, 40 mg at 12/10/19 0527    polyethylene glycol (MIRALAX) packet 17 g, 17 g, Oral, Daily PRN, Krystyna Mcclain MD    polyvinyl alcohol (LIQUIFILM TEARS) 1 4 % ophthalmic solution 1 drop, 1 drop, Both Eyes, Q3H PRN, Felisa Florence MD    sertraline (ZOLOFT) tablet 50 mg, 50 mg, Oral, BID, Felisa Florence MD, 50 mg at 12/10/19 8897    sucralfate (CARAFATE) oral suspension 1,000 mg, 1,000 mg, Oral, BID, Jaiden Mcknight MD, 1,000 mg at 12/10/19 0608    theophylline (JEF-24) 24 hr capsule 200 mg, 200 mg, Oral, Daily, Felisa Florence MD, 200 mg at 12/10/19 2569    tiotropium [facility]) capsule for inhaler 18 mcg, 18 mcg, Inhalation, Daily, Felisa Florence MD, 18 mcg at 12/10/19 7855    traZODone (DESYREL) tablet 25 mg, 25 mg, Oral, HS PRN, Felisa Florence MD  Justification if on more than two antipsychotics:  Only on his clozapine  Side effects if any:  None    Risks , benefits, side effects and precautions of medications discussed with patient and reminded patient to let us know any problems with medications     Objective:     Vital Signs:  Vitals:    12/09/19 1929 12/09/19 2140 12/10/19 0730 12/10/19 0732   BP: 108/57  112/67 97/56   BP Location: Right arm  Left arm Left arm   Pulse: 77  81 68   Resp: 18  18    Temp: 97 7 °F (36 5 °C)  97 9 °F (36 6 °C)    TempSrc: Temporal  Temporal    SpO2: (!) 88% (!) 86%     Weight:       Height:         Appearance:  age appropriate, casually dressed and overweight older than stated age, friendly not too pleasant  with hair uncombed laying in bed wearing a mask on her face   Behavior:  evasive and guarded and suspicious and preoccupied with psychosomatic complaints appearing anxious and cooperative excusing herself from coming to team because of the mask she is wearing   Speech:  normal pitch and normal volume but circumstantial and tangential with stilted speech    Mood:  anxious and dysthymic   Affect:  mood-congruent, elated and entitled anxious and irritated and sad at times but pleasant today    Thought Process:  goal directed and illogical slightly pressured and tangential and talks as if in a court of law   Thought Content:  Still with fixed psychosomatic beliefss that she cannot breathe or cannot swallow or having  hypoglycemiaor having cancer and dying from it  No current suicidal homicidal thoughts intent or plans reported  No phobias obsessions or compulsions reported  Still accusatory  to certain staff and fixated oon multiple physical complaints   Perceptual Disturbances: None and does not appear responding to internal stimuli at times   Risk Potential: Tendency to not care for herself    Sensorium:  person, place, time/date, situation, day of week, month of year and time   Cognition:  grossly intact   Consciousness:  alert and awake    Attention: Intact concentration and attention span   Intellect: Considered to be at least of average intelligence   Insight:  limited and in denial of her illness   Judgment: poor      Motor Activity: no abnormal movements         Recent Labs:  Results Reviewed     None          I/O Past 24 hours:  No intake/output data recorded  No intake/output data recorded          Assessment / Plan:     Schizoaffective disorder, bipolar [facility] ([facility] )      Reason for continued inpatient care:  Because of underlying paranoia and refusal to go back to the personal care home and inability to care for herself on her own  Acceptance by patient:  Accepting  Regan Keita in recovery:  About living in another personal care home once she feels better  Understanding of medications :  Has some understanding  Involved in reintegration process:  Adjusting to the unit  Trusting in relatoinship with psychiatrist:  Trusting    Recommended Treatment:    Medication changes:  1) none today  Non-pharmacological treatments  1) continue with  individual therapy group therapy, milieu therapy and occupational therapy and milieu therapy involving multidisciplinary team approach with psychotherapist, case management, nursing, peer support services, art therapist etc using recovery principles and psycho-education about accepting illness and need for treatment   2) encourage to cocoperate with behavior plan  3) encourage to attend to ADLs like taking showers and wearing clean clothes   4) Encourage to attend groups   Safety  1) Safety/communication plan established targeting dynamic risk factors above  Discharge Plan most likely back to the a:  Personal care boarding home with act services once her delusions are managed and mood becomes more stabilized and she attends to her ADLs she becomes more receptive to treatment compliance but she has not shown any improvement in the last 5 months since she has been on the unit and hence the referral needs to be initiated for the  [address] [place] [address] / Coordination of Care    Total floor / unit time spent today 15 minutes  Greater than 50% of total time was spent with the patient and / or family counseling and / or coordination of care  A description of the counseling / coordination of care  Patient's Rights, confidentiality and exceptions to confidentiality, use of automated medical record, [facility] staff access to medical record, and consent to treatment reviewed      Ervin Little MD

## 2019-12-10 NOTE — PLAN OF CARE
Problem: Alteration in Thoughts and Perception  Goal: Agree to be compliant with medication regime, as prescribed and report medication side effects  Description  Interventions:  - Offer appropriate PRN medication and supervise ingestion; conduct aims, as needed   Outcome: Progressing  Goal: Attend and participate in unit activities, including therapeutic, recreational, and educational groups  Description  Interventions:  - Provide therapeutic and educational activities daily, encourage attendance and participation, and document same in the medical record     CERTIFIED PEER SPECIALIST INTERVENTIONS:    Complete peer assessment with patient to assess their needs and identify their goals to complete while in the recovery program as well as once discharged into the community  Patient will complete WRAP Plan, Crisis Plan and 5 Life Domains  Patient will attend 50% of groups offered on the unit  Patient will complete a goal card weekly  Outcome: Progressing   Patient continues to be mostly isolative to her room  She is getting OOB for meals and structured activities today  She is stating that her O2 saturations have been lower than usual   This writer did get a sat on her and it was 90% at [facility]  Patient was seen by [place] wellness resident and pulmonology today  Refer to previous notes for specific information regarding those encounters  Patient ate 50% of breakfast which was her oatmeal and all of her drinks  She attended and participated in pet therapy/IMR group this shift  CT without contrast ordered this afternoon  Patient left the unit at 1350 to go to the radiology dept to get that done  Awaiting her return

## 2019-12-10 NOTE — PROGRESS NOTES
Patient had c/o "coughing up blood"  She did show this writer a small amount of bloody sputum  Dr Roderick Hastings  Paged and did see the patient  He ordered a consult to pulmonology and a PA and lateral chest x-ray

## 2019-12-10 NOTE — ASSESSMENT & PLAN NOTE
Psychiatry Progress Note  Patient was seen by [facility] yesterday and started on ensure once a day and today she complained of spitting  Blood and is being investigated for TB and is now wearing a mask till results are back    She  is still psychosomatic claiming that she cannot swallow or has low blood sugar or she is going to die if she takes a shower and also believes that people are tampering with her medications or with the oxygen concentrator all her spirometer and these are all fixed beliefs  She did attend about 25% of groups and did take a shower two nights ago  She was again told that the expectation is that she should take showers twice a week at least or at the least once a week and reminded that without showers she may not be allowed to be in the community opeers as peers were complaining about a bad odor from her in the past due to poor ADLs  No signs or sxs of agranulocytosis or myocarditis or endocarditis and she is moving her bowels so far with no issues   She has been sleeping well and has been still using the oxygen concentrator at night and reports that she is happy to hear that she can go back to the  personal care home she came from if she keeps up with her improvement or face transfer to the [facility] Did not come and attend the team meeting today when called so I spoke toher at her bedside    Current medications:    Current Facility-Administered Medications:     acetaminophen (TYLENOL) tablet 650 mg, 650 mg, Oral, Q6H PRN, Alirio Frazier MD    albuterol (PROVENTIL HFA,VENTOLIN HFA) inhaler 2 puff, 2 puff, Inhalation, Q4H PRN, Alirio Frazier MD, 2 puff at 10/11/19 0424    aluminum-magnesium hydroxide-simethicone (MYLANTA) 200-200-20 mg/5 mL oral suspension 15 mL, 15 mL, Oral, Q4H PRN, Alirio Frazier MD    ammonium lactate (LAC-HYDRIN) 12 % lotion 1 application, 1 application, Topical, BID PRN, Alirio Frazier MD    benzonatate (TESSALON PERLES) capsule 100 mg, 100 mg, Oral, TID PRN, Dalton Garner MD    benztropine (COGENTIN) injection 1 mg, 1 mg, Intramuscular, Q8H PRN, Dalton Garner MD    carbamide peroxide (DEBROX) 6 5 % otic solution 5 drop, 5 drop, Left Ear, BID, Rona Correia MD, 5 drop at 12/09/19 1821    cloZAPine (CLOZARIL) tablet 25 mg, 25 mg, Oral, BID, Dalton Garner MD, 25 mg at 12/09/19 2120    cloZAPine (CLOZARIL) tablet 50 mg, 50 mg, Oral, BID, Dalton Garner MD, 50 mg at 12/09/19 2120    EPINEPHrine PF (ADRENALIN) 1 mg/mL injection 0 15 mg, 0 15 mg, Intramuscular, Once PRN, Dalton Garner MD    fluticasone-vilanterol (BREO ELLIPTA) 200-25 MCG/INH inhaler 1 puff, 1 puff, Inhalation, Daily, Dalton Garner MD, 1 puff at 12/10/19 0826    ketotifen (ZADITOR) 0 025 % ophthalmic solution 1 drop, 1 drop, Right Eye, BID PRN, Dalton Garner MD    levothyroxine tablet 125 mcg, 125 mcg, Oral, Early Morning, Dalton Garner MD, 125 mcg at 12/10/19 0555    magnesium hydroxide (MILK OF MAGNESIA) 400 mg/5 mL oral suspension 30 mL, 30 mL, Oral, Daily PRN, Dalton Garner MD    montelukast (SINGULAIR) tablet 10 mg, 10 mg, Oral, HS, Dalton Garner MD, 10 mg at 11/12/19 2141    OLANZapine (ZyPREXA) IM injection 5 mg, 5 mg, Intramuscular, Q8H PRN, Dalton Garner MD    OLANZapine (ZyPREXA) tablet 5 mg, 5 mg, Oral, Q8H PRN, Dalton Garner MD    ondansetron (ZOFRAN-ODT) dispersible tablet 4 mg, 4 mg, Oral, Q6H PRN, Dalton Garner MD, 4 mg at 12/09/19 1757    pantoprazole (PROTONIX) EC tablet 40 mg, 40 mg, Oral, Early Morning, Dalton Garner MD, 40 mg at 12/10/19 0554    polyethylene glycol (MIRALAX) packet 17 g, 17 g, Oral, Daily PRN, Dalton Garner MD    polyvinyl alcohol (LIQUIFILM TEARS) 1 4 % ophthalmic solution 1 drop, 1 drop, Both Eyes, Q3H PRN, Dalton Garner MD    sertraline (ZOLOFT) tablet 50 mg, 50 mg, Oral, BID, Dalton Garner MD, 50 mg at 12/10/19 0826    sucralfate (CARAFATE) oral suspension 1,000 mg, 1,000 mg, Oral, BID, Cat Torres MD, 1,000 mg at 12/10/19 0608    theophylline (JEF-24) 24 hr capsule 200 mg, 200 mg, Oral, Daily, Carissa Willis MD, 200 mg at 12/10/19 0826    tiotropium [facility]) capsule for inhaler 18 mcg, 18 mcg, Inhalation, Daily, Carissa Willis MD, 18 mcg at 12/10/19 1758    traZODone (DESYREL) tablet 25 mg, 25 mg, Oral, HS PRN, Carissa Willis MD  Justification if on more than two antipsychotics:  Only on his clozapine  Side effects if any:  None    Risks , benefits, side effects and precautions of medications discussed with patient and reminded patient to let us know any problems with medications     Objective:     Vital Signs:  Vitals:    12/09/19 1929 12/09/19 2140 12/10/19 0730 12/10/19 0732   BP: 108/57  112/67 97/56   BP Location: Right arm  Left arm Left arm   Pulse: 77  81 68   Resp: 18  18    Temp: 97 7 °F (36 5 °C)  97 9 °F (36 6 °C)    TempSrc: Temporal  Temporal    SpO2: (!) 88% (!) 86%     Weight:       Height:         Appearance:  age appropriate, casually dressed and overweight older than stated age, friendly not too pleasant  with hair uncombed laying in bed wearing a mask on her face   Behavior:  evasive and guarded and suspicious and preoccupied with psychosomatic complaints appearing anxious and cooperative excusing herself from coming to team because of the mask she is wearing   Speech:  normal pitch and normal volume but circumstantial and tangential with stilted speech    Mood:  anxious and dysthymic   Affect:  mood-congruent, elated and entitled anxious and irritated and sad at times but pleasant today    Thought Process:  goal directed and illogical slightly pressured and tangential and talks as if in a court of law   Thought Content:  Still with fixed psychosomatic beliefss that she cannot breathe or cannot swallow or having  hypoglycemiaor having cancer and dying from it  No current suicidal homicidal thoughts intent or plans reported  No phobias obsessions or compulsions reported    Still accusatory  to certain staff and fixated oon multiple physical complaints   Perceptual Disturbances: None and does not appear responding to internal stimuli at times   Risk Potential: Tendency to not care for herself    Sensorium:  person, place, time/date, situation, day of week, month of year and time   Cognition:  grossly intact   Consciousness:  alert and awake    Attention: Intact concentration and attention span   Intellect: Considered to be at least of average intelligence   Insight:  limited and in denial of her illness   Judgment: poor      Motor Activity: no abnormal movements         Recent Labs:  Results Reviewed     None          I/O Past 24 hours:  No intake/output data recorded  No intake/output data recorded  Assessment / Plan:     [facility]oaffective [facility], [facility]      Reason for continued inpatient care:  Because of underlying paranoia and refusal to go back to the personal care home and inability to care for herself on her own  Acceptance by patient:  Accepting  Audelia Arthur in recovery:  About living in another personal care home once she feels better  Understanding of medications :  Has some understanding  Involved in reintegration process:  Adjusting to the unit  Trusting in relatoinship with psychiatrist:  Trusting    Recommended Treatment:    Medication changes:  1) none today  Non-pharmacological treatments  1) continue with  individual therapy group therapy, milieu therapy and occupational therapy and milieu therapy involving multidisciplinary team approach with psychotherapist, case management, nursing, peer support services, art therapist etc using recovery principles and psycho-education about accepting illness and need for treatment   2) encourage to cocoperate with behavior plan  3) encourage to attend to ADLs like taking showers and wearing clean clothes   4) Encourage to attend groups   Safety  1) Safety/communication plan established targeting dynamic risk factors above    Discharge Plan most likely back to the a:  Personal care boarding home with act services once her delusions are managed and mood becomes more stabilized and she attends to her ADLs she becomes more receptive to treatment compliance but she has not shown any improvement in the last 5 months since she has been on the unit and hence the referral needs to be initiated for the  [address] [place] [address] / Coordination of Care    Total floor / unit time spent today 15 minutes  Greater than 50% of total time was spent with the patient and / or family counseling and / or coordination of care  A description of the counseling / coordination of care  Patient's Rights, confidentiality and exceptions to confidentiality, use of automated medical record, [facility] staff access to medical record, and consent to treatment reviewed      Jaz Beasley MD

## 2019-12-10 NOTE — PROGRESS NOTES
12/10/19 0930   Team Meeting   Meeting Type Tx Team Meeting   Initial Conference Date 12/10/19   Next Conference Date 12/17/19   Team Members Present   Team Members Present Physician;Nurse;; Other (Discipline and Name)   Physician Team Member Dr Ricardo Rivera Team Member Delphine Whiting RN   Care Management Team Member Brenda Sullivan   Other (Discipline and Name) Twan Richards LCSW   Patient/Family Present   Patient Present No   Patient's Family Present No     Patient refused to attended treatment team meeting this morning, reporting she is unable to attend because she is coughing up blood  [id number] [facility] staff is aware of this and notified medical  Patient attended 25% of group offered last week  Will attempt to meet with patient again next week

## 2019-12-10 NOTE — PLAN OF CARE
Problem: PAIN - ADULT  Goal: Verbalizes/displays adequate comfort level or baseline comfort level  Description  Interventions:  - Encourage patient to monitor pain and request assistance  - Assess pain using appropriate pain scale  - Administer analgesics based on type and severity of pain and evaluate response  - Implement non-pharmacological measures as appropriate and evaluate response  - Consider cultural and social influences on pain and pain management  - Notify physician/advanced practitioner if interventions unsuccessful or patient reports new pain  Outcome: Progressing     Problem: SAFETY ADULT  Goal: Patient will remain free of falls  Description  INTERVENTIONS:  - Assess patient frequently for physical needs  -  Identify cognitive and physical deficits and behaviors that affect risk of falls    -  [place] fall precautions as indicated by assessment   - Educate patient/family on patient safety including physical limitations  - Instruct patient to call for assistance with activity based on assessment  - Modify environment to reduce risk of injury  - Consider OT/PT consult to assist with strengthening/mobility  Outcome: Progressing     Problem: RESPIRATORY - ADULT  Goal: Achieves optimal ventilation and oxygenation  Description  INTERVENTIONS:  - Assess for changes in respiratory status  - Assess for changes in mentation and behavior  - Position to facilitate oxygenation and minimize respiratory effort  - Oxygen administration by appropriate delivery method based on oxygen saturation (per order) or ABGs  - Initiate smoking cessation education as indicated  - Encourage broncho-pulmonary hygiene including cough, deep breathe, Incentive Spirometry  - Assess the need for suctioning and aspirate as needed  - Assess and instruct to report SOB or any respiratory difficulty  - Respiratory Therapy support as indicated  Outcome: Progressing     Problem: SLEEP DISTURBANCE  Goal: Will exhibit normal sleeping pattern  Description  Interventions:  -  Assess the patients sleep pattern, noting recent changes  - Administer medication as ordered  - Decrease environmental stimuli, including noise, as appropriate during the night  - Encourage the patient to actively participate in unit groups and or exercise during the day to enhance ability to achieve adequate sleep at night  - Assess the patient, in the morning, encouraging a description of sleep experience  Outcome: Progressing     Sudhakar Stagger in bed with eyes closed, breath even and unlabored   On O2 with humidifier @1L/m via nasal cannula   Q 15 minutes rounding   Maintained on ongoing fall and SAFE precaution   No somatic complaint overnight   No respiratory distress   Will continue to monitor

## 2019-12-11 NOTE — PROGRESS NOTES
12/10/19 1500   Activity/Group Checklist   Group   (Recovery Workshop )   Attendance Attended   Attendance Duration (min) 46-60   Interactions Did not interact   Affect/Mood Appropriate   Goals Achieved Able to listen to others

## 2019-12-11 NOTE — ASSESSMENT & PLAN NOTE
Psychiatry Progress Note  Patient was seen by [facility] group and had x-ray as well as CT scan of chest which showed right lower lobe of her lung with either pleural effusion or pneumonia and was seen by pulmonary who did not want to do anything at this time as she had the same lesion on the x-ray from August as well    She  is still psychosomatic claiming that she cannot swallow or has low blood sugar or she is going to die if she takes a shower and also believes that people are tampering with her medications or with the oxygen concentrator all her spirometer and these are all fixed beliefs and even convinced 1 of the night shift to check her sugar last night   Seen taken showers in 3 nights in a row and states she will try to take 1 today as she was told that the expression station is for her to take at least 2 showers a week and to attend to her ADLs skills and attend 25% of the groups No signs or sxs of agranulocytosis or myocarditis or endocarditis and she is moving her bowels so far with no issues      She has been sleeping well and has been still using the oxygen concentrator at night and reports that she is happy to hear that she can go back to the  personal care home she came from if she keeps up with her improvement or face transfer to the [facility] hospital   She was found sitting in the dining arrieta today when approached and was trying to talk to the medical resident inviting him to join her and her sister for a social outing pointing towards her delusional thinking that he is her boyfriend and I did redirect her stating that such interaction is not allowed he and that she should respect the patient provided boundaries   Current medications:    Current Facility-Administered Medications:     acetaminophen (TYLENOL) tablet 650 mg, 650 mg, Oral, Q6H PRN, Sindy Day MD    albuterol (PROVENTIL HFA,VENTOLIN HFA) inhaler 2 puff, 2 puff, Inhalation, Q4H PRN, Sindy Day MD, 2 puff at 10/11/19 0424   aluminum-magnesium hydroxide-simethicone (MYLANTA) 200-200-20 mg/5 mL oral suspension 15 mL, 15 mL, Oral, Q4H PRN, Prakash Brewster MD    ammonium lactate (LAC-HYDRIN) 12 % lotion 1 application, 1 application, Topical, BID PRN, Prakash Brewster MD    benzonatate (TESSALON PERLES) capsule 100 mg, 100 mg, Oral, TID PRN, Prakash Brewster MD    benztropine (COGENTIN) injection 1 mg, 1 mg, Intramuscular, Q8H PRN, Prakash Brewster MD    carbamide peroxide (DEBROX) 6 5 % otic solution 5 drop, 5 drop, Left Ear, BID, Rona Phillips MD, 5 drop at 12/11/19 0819    cloZAPine (CLOZARIL) tablet 25 mg, 25 mg, Oral, BID, Prakash Brewster MD, 25 mg at 12/10/19 2057    cloZAPine (CLOZARIL) tablet 50 mg, 50 mg, Oral, BID, Prakash Brewster MD, 50 mg at 12/10/19 2057    EPINEPHrine PF (ADRENALIN) 1 mg/mL injection 0 15 mg, 0 15 mg, Intramuscular, Once PRN, Prakash Brewster MD    fluticasone-vilanterol (BREO ELLIPTA) 200-25 MCG/INH inhaler 1 puff, 1 puff, Inhalation, Daily, Prakash Brewster MD, 1 puff at 12/11/19 0819    ketotifen (ZADITOR) 0 025 % ophthalmic solution 1 drop, 1 drop, Right Eye, BID PRN, Prakash Brewster MD    levothyroxine tablet 125 mcg, 125 mcg, Oral, Early Morning, Prakash Brewster MD, 125 mcg at 12/11/19 0600    magnesium hydroxide (MILK OF MAGNESIA) 400 mg/5 mL oral suspension 30 mL, 30 mL, Oral, Daily PRN, Prakash Brewster MD    montelukast (SINGULAIR) tablet 10 mg, 10 mg, Oral, HS, Prakash Brewster MD, 10 mg at 11/12/19 2141    OLANZapine (ZyPREXA) IM injection 5 mg, 5 mg, Intramuscular, Q8H PRN, Prakash Brewster MD    OLANZapine (ZyPREXA) tablet 5 mg, 5 mg, Oral, Q8H PRN, Prakash Brewster MD    ondansetron (ZOFRAN-ODT) dispersible tablet 4 mg, 4 mg, Oral, Q6H PRN, Prakash Brewster MD, 4 mg at 12/09/19 1757    pantoprazole (PROTONIX) EC tablet 40 mg, 40 mg, Oral, Early Morning, Prakash Brewster MD, 40 mg at 12/11/19 0600    polyethylene glycol (MIRALAX) packet 17 g, 17 g, Oral, Daily PRN, Prakash Brewster MD    polyvinyl alcohol (LIQUIFILM TEARS) 1 4 % ophthalmic solution 1 drop, 1 drop, Both Eyes, Q3H PRN, Jeet Levine MD    sertraline (ZOLOFT) tablet 50 mg, 50 mg, Oral, BID, Jeet Levine MD, 50 mg at 12/11/19 5815    sucralfate (CARAFATE) oral suspension 1,000 mg, 1,000 mg, Oral, BID, Cat Torres MD, 1,000 mg at 12/11/19 0600    theophylline (JEF-24) 24 hr capsule 200 mg, 200 mg, Oral, Daily, Jeet Levine MD, 200 mg at 12/11/19 0819    tiotropium (SPIRIVA) capsule for inhaler 18 mcg, 18 mcg, Inhalation, Daily, Jeet Levine MD, 18 mcg at 12/11/19 0819    traZODone (DESYREL) tablet 25 mg, 25 mg, Oral, HS PRN, Jeet Levine MD  Justification if on more than two antipsychotics:  Only on his clozapine  Side effects if any:  None    Risks , benefits, side effects and precautions of medications discussed with patient and reminded patient to let us know any problems with medications     Objective:     Vital Signs:  Vitals:    12/10/19 2100 12/11/19 0607 12/11/19 0730 12/11/19 0735   BP:   105/56 (!) 88/52   BP Location:   Left arm Left arm   Pulse: 98  73 64   Resp:   16 16   Temp:  98 4 °F (36 9 °C) 98 6 °F (37 °C)    TempSrc:  Temporal Temporal    SpO2: (!) 88% 94%     Weight:       Height:         Appearance:  age appropriate, casually dressed and overweight older than stated age, friendly not too pleasant  with hair uncombed l with a body odorr and found sitting in the dining arrieta   Behavior:  evasive and guarded and suspicious and preoccupied with psychosomatic complaints appearing anxious and cooperative no longer wearing the facial mask   Speech:  normal pitch and normal volume but circumstantial and tangential with stilted speech    Mood:  anxious and dysthymic   Affect:  mood-congruent, elated and entitled anxious and irritated and sad at times but pleasant today    Thought Process:  goal directed and illogical slightly pressured and tangential and talks as if in a court of law   Thought Content:  Still with fixed psychosomatic beliefs that she cannot breathe or cannot swallow or having  Hypoglycemia or having cancer and dying from some terrible disease  No current suicidal homicidal thoughts intent or plans reported  No phobias obsessions or compulsions reported  Still accusatory  to certain staff and fixated on multiple physical complaints still believes that the medical resident is a boyfriend  Perceptual Disturbances: None and does not appear responding to internal stimuli at times   Risk Potential: Tendency to not care for herself    Sensorium:  person, place, time/date, situation, day of week, month of year and time   Cognition:  grossly intact   Consciousness:  alert and awake    Attention: Intact concentration and attention span   Intellect: Considered to be at least of average intelligence   Insight:  limited and in denial of her illness   Judgment: poor      Motor Activity: no abnormal movements         Recent Labs:  Results Reviewed     None          I/O Past 24 hours:  No intake/output data recorded  No intake/output data recorded          Assessment / Plan:     [facility]ive [facility]      Reason for continued inpatient care:  Because of underlying paranoia and refusal to go back to the personal care home and inability to care for herself on her own  Acceptance by patient:  Accepting  Filomena Powell in recovery:  About living in another personal care home once she feels better  Understanding of medications :  Has some understanding  Involved in reintegration process:  Adjusting to the unit  Trusting in relatoinship with psychiatrist:  Trusting    Recommended Treatment:    Medication changes:  1) none today  Non-pharmacological treatments  1) continue with  individual therapy group therapy, milieu therapy and occupational therapy and milieu therapy involving multidisciplinary team approach with psychotherapist, case management, nursing, peer support services, art therapist etc using recovery principles and psycho-education about accepting illness and need for treatment   2) encourage to cocoperate with behavior plan  3) encourage to attend to ADLs like taking showers and wearing clean clothes   4) Encourage to attend groups   Safety  1) Safety/communication plan established targeting dynamic risk factors above  Discharge Plan most likely back to the a:  Personal care boarding home with act services once her delusions are managed and mood becomes more stabilized and she attends to her ADLs she becomes more receptive to treatment compliance but she has not shown any improvement in the last 5 months since she has been on the unit and hence the referral needs to be initiated for the  [address] [place] [address] / Coordination of Care    Total floor / unit time spent today 15 minutes  Greater than 50% of total time was spent with the patient and / or family counseling and / or coordination of care  A description of the counseling / coordination of care  Patient's Rights, confidentiality and exceptions to confidentiality, use of automated medical record, [facility] staff access to medical record, and consent to treatment reviewed      Oval MD Joseluis

## 2019-12-11 NOTE — PROGRESS NOTES
12/11/19 0800   Team Meeting   Meeting Type Daily Rounds   Team Members Present   Team Members Present Physician;Nurse;; Other (Discipline and Name)   Physician Team Member Dr Lg Cortez, RN   Care Management Team Member Brenda Cancino   Other (Discipline and Name) Emerson Simon LCSW     Patient attended some groups  Possibly has pneumonia  Slept

## 2019-12-11 NOTE — PROGRESS NOTES
Pharmacy Clozapine Monitoring Progress Note     Violetta Villasenor is receiving a total daily dose of 150 mg of clozapine divided as 75mg at 1600 and 75mg at 2000  Pharmacist Recommendations Based on Assessment and Plan (below):    1  Patient is Clozapine-REMS eligible, ANC was updated, with weekly monitoring frequency and the next [address] is due on 12/13/2019           Assessment/Plan:    Phase of Treatment:     Current phase of treatment is initation/titration  Patient Clozapine REMS Eligibility:     Patient is eligible to receive clozapine and the [address] monitoring frequency is weekly per clozapine REMS  The patient's latest [address] value has been updated into the Clozapine-REMS program          ANC and Clozapine Level (if available)     The most recent [address] was   Lab Results   Component Value Date    NEUTROABS 5 30 12/06/2019    and the next lab is due on 12/13/19  Last Clozapine level (if available):    0   Lab Value Date/Time    CLOZAPINE 324 (L) 08/16/2019 1131     0   Lab Value Date/Time    NCLOZIP 207 08/16/2019 1131           Monitoring Parameters for Clozapine:     Clozapine Adverse Effect Suggested Monitoring Patient's Current Status    Agranulocytosis ANC baseline and then repeat weekly for first 6 months and every 2 weeks for the second 6 months  Maintenance after one year of therapy every month  The most recent [address] was   Lab Results   Component Value Date    NEUTROABS 5 30 12/06/2019       This ANC is considered normal range (ANC >/= 1500/mcL)  Continue current treatment and monitoring course  Respiratory Depression Please ensure patient is not on concomitant respiratory lowering medications such as benzodiazepines, sedative hypnotics (eg  zolpidem) This patient is not on respiratory depression lowering medications   Myocarditis Baseline and weekly EKG, CRP, BNP, and troponin  Weekly symptomatic complaints of fatigue, dyspnea, tachycardia, chest pain, palpitations, and fever for the first 4 weeks  Last EKG Results on  11/18/19 showed:     "Sinus rhythm with occasional Premature ventricular complexes  Left axis deviation  Low voltage QRS  Inferior infarct (cited on or before 18-NOV-2019)  Abnormal ECG  When compared with ECG of 21-AUG-2019 11:33,  Premature ventricular complexes are now Present  Confirmed by Erich Noble ([id number]) on 11/19/2019 10:18:37 PM"     Last QTC value was:  0   Lab Value Date/Time    QTCINT 482 11/18/2019 1319       Last BNP was: No results found for: NTBNP    Last Troponin was:  0   Lab Value Date/Time    TROPONINI <0 01 05/01/2019 1318    TROPONINI <0 04 05/10/2015 1948        Orthostatic hypotension/  bradycardia Blood pressure and vital signs      Monitor blood pressure and orthostatic hypotension at least twice daily upon initiation and continue to follow at least once daily afterwards  Patient's last recorded vital signs:       BP (!) 88/52 (BP Location: Left arm)   Pulse 64   Temp 98 6 °F (37 °C) (Temporal)   Resp 16   Ht 5' 4" (1 626 m)   Wt 65 6 kg (144 lb 9 6 oz)   SpO2 94%   BMI 24 82 kg/m²             Constipation (bowel obstruction due to high anticholinergic clozapine burden) Assess at baseline and weekly during first four months of therapy  Docusate 100mg BID and Miralax 17 grams daily recommended initially  Prophylactic bowel regimen ordered and includes: sucralfate   Sialorrhea Assess at baseline and weekly during first four months of therapy  May be managed using sublingual anticholinergic preparations (atropine 1% eye drops)  Patient is not experiencing sialorrhea  This will continue to be monitored  Please note that per current REMS protocol the provider can submit a treatment rationale that justifies clozapine treatment even if patient parameters are outside of REMS recommendations  The Clozapine REMS is an FDA-mandated program implemented by the manufacturers of clozapine  ([employer]ozapineUI/home  u)  Pharmacy will continue to follow patient with team, thank you    Electronically signed by: Belen Aparicio, Kiran, [address], Clinical Pharmacist - Psychiatry

## 2019-12-11 NOTE — PLAN OF CARE
Problem: Alteration in Thoughts and Perception  Goal: Verbalize thoughts and feelings  Description  Interventions:  - Promote a nonjudgmental and trusting relationship with the patient through active listening and therapeutic communication  - Assess patient's level of functioning, behavior and potential for risk  - Engage patient in 1 on 1 interactions for a minimum of 15 minutes each session  - Encourage patient to express fears, feelings, frustrations, and discuss symptoms    - Ursa patient to reality, help patient recognize reality-based thinking   - Administer medications as ordered and assess for potential side effects  - Provide the patient education related to the signs and symptoms of the illness and desired effects of prescribed medications  Outcome: Progressing  Goal: Agree to be compliant with medication regime, as prescribed and report medication side effects  Description  Interventions:  - Offer appropriate PRN medication and supervise ingestion; conduct aims, as needed   Outcome: Progressing  Goal: Attend and participate in unit activities, including therapeutic, recreational, and educational groups  Description  Interventions:  - Provide therapeutic and educational activities daily, encourage attendance and participation, and document same in the medical record     CERTIFIED PEER SPECIALIST INTERVENTIONS:    Complete peer assessment with patient to assess their needs and identify their goals to complete while in the recovery program as well as once discharged into the community  Patient will complete WRAP Plan, Crisis Plan and [employer]  Patient will attend 50% of groups offered on the unit  Patient will complete a goal card weekly      Outcome: Progressing  Goal: Complete daily ADLs, including personal hygiene independently, as able  Description  Interventions:  - Observe, teach, and assist patient with ADLS  - Monitor and promote a balance of rest/activity, with adequate nutrition and elimination   Outcome: Not Progressing     Problem: Ineffective Coping  Goal: Identifies ineffective coping skills  Outcome: Not Progressing  Goal: Identifies healthy coping skills  Outcome: Not Progressing  Goal: Demonstrates healthy coping skills  Outcome: Progressing  Goal: Participates in unit activities  Description  Interventions:  - Provide therapeutic environment   - Provide required programming   - Redirect inappropriate behaviors   Outcome: Not Progressing  Goal: Understands least restrictive measures  Description  Interventions:  - Utilize least restrictive behavior  Outcome: Progressing     Problem: Risk for Self Injury/Neglect  Goal: Complete daily ADLs, including personal hygiene independently, as able  Description  Interventions:  - Observe, teach, and assist patient with ADLS  - Monitor and promote a balance of rest/activity, with adequate nutrition and elimination  Outcome: Not Progressing     Problem: Depression  Goal: Refrain from isolation  Description  Interventions:  - Develop a trusting relationship   - Encourage socialization   Outcome: Progressing  Goal: Refrain from self-neglect  Outcome: Not Progressing     Problem: Anxiety  Goal: Anxiety is at manageable level  Description  Interventions:  - Assess and monitor patient's anxiety level  - Monitor for signs and symptoms of anxiety both physical and emotional (heart palpitations, chest pain, shortness of breath, headaches, nausea, feeling jumpy, restlessness, irritable, apprehensive)  - Collaborate with interdisciplinary team and initiate plan and interventions as ordered    - Alicia patient to unit/surroundings  - Explain treatment plan  - Encourage participation in care  - Encourage verbalization of concerns/fears  - Identify coping mechanisms  - Assist in developing anxiety-reducing skills  - Administer/offer alternative therapies  - Limit or eliminate stimulants  Outcome: Progressing     Problem: Alteration in Orientation  Goal: Interact with staff daily  Description  Interventions:  - Assess and re-assess patient's level of orientation  - Engage patient in 1 on 1 interactions, daily, for a minimum of 15 minutes   - Establish rapport/trust with patient   Outcome: Progressing  Goal: Cooperate with recommended testing/procedures  Description  Interventions:  - Determine need for ancillary testing  - Observe for mental status changes  - Implement falls/precaution protocol   Outcome: Progressing     Problem: SAFETY ADULT  Goal: Patient will remain free of falls  Description  INTERVENTIONS:  - Assess patient frequently for physical needs  -  Identify cognitive and physical deficits and behaviors that affect risk of falls    -  [place] fall precautions as indicated by assessment   - Educate patient/family on patient safety including physical limitations  - Instruct patient to call for assistance with activity based on assessment  - Modify environment to reduce risk of injury  - Consider OT/PT consult to assist with strengthening/mobility  Outcome: Progressing     Problem: RESPIRATORY - ADULT  Goal: Achieves optimal ventilation and oxygenation  Description  INTERVENTIONS:  - Assess for changes in respiratory status  - Assess for changes in mentation and behavior  - Position to facilitate oxygenation and minimize respiratory effort  - Oxygen administration by appropriate delivery method based on oxygen saturation (per order) or ABGs  - Initiate smoking cessation education as indicated  - Encourage broncho-pulmonary hygiene including cough, deep breathe, Incentive Spirometry  - Assess the need for suctioning and aspirate as needed  - Assess and instruct to report SOB or any respiratory difficulty  - Respiratory Therapy support as indicated  Outcome: Progressing     Problem: Nutrition/Hydration-ADULT  Goal: Nutrient/Hydration intake appropriate for improving, restoring or maintaining nutritional needs  Description  Monitor and assess patient's nutrition/hydration status for malnutrition  Collaborate with interdisciplinary team and initiate plan and interventions as ordered  Monitor patient's weight and dietary intake as ordered or per policy  Utilize nutrition screening tool and intervene as necessary  Determine patient's food preferences and provide high-protein, high-caloric foods as appropriate  INTERVENTIONS:  - Monitor oral intake, urinary output, labs, and treatment plans  - Assess nutrition and hydration status and recommend course of action  - Evaluate amount of meals eaten  - Assist patient with eating if necessary   - Allow adequate time for meals  - Recommend/ encourage appropriate diets, oral nutritional supplements, and vitamin/mineral supplements  - Order, calculate, and assess calorie counts as needed  - Recommend, monitor, and adjust tube feedings and TPN/PPN based on assessed needs  - Assess need for intravenous fluids  - Provide specific nutrition/hydration education as appropriate  - Include patient/family/caregiver in decisions related to nutrition  Outcome: Carlitos Cook has been awake, alert, and visible intermittently out in the milieu  Pt ate 10% supper and had one Ensure  Pt used Incentive Spirometer x 5 this shift with encouragement from staff  Received phone call from [facility] from [place] [address] Radiology at [phone] who reported that Dr Shelia Aranda (pulmonology) did not respond and that results of Chest CT Scan are now in [facility]   Dr Loretta Craig pulmonologist on call paged and informed at [phone] that Chest CT Scan was marked for immediate notification in Epic and that Dr Shelia Aranda did not respond  Dr Fuad Larry stated that she would look at test results  No orders received  Leonidchristianne Zaragozacayla did not attend evening group but came out for snack after  Compliant with all scheduled meds without prompting  Pt refused 2200 dose of Singulair  Resting quietly in bed at present with nasal O2 on at 1 L  via oxygen concentrator    No somatic complaints this shift  Continue to monitor/assess for any changes

## 2019-12-11 NOTE — PLAN OF CARE
Problem: Alteration in Thoughts and Perception  Goal: Agree to be compliant with medication regime, as prescribed and report medication side effects  Description  Interventions:  - Offer appropriate PRN medication and supervise ingestion; conduct aims, as needed   Outcome: Progressing     Problem: Alteration in Orientation  Goal: Interact with staff daily  Description  Interventions:  - Assess and re-assess patient's level of orientation  - Engage patient in 1 on 1 interactions, daily, for a minimum of 15 minutes   - Establish rapport/trust with patient   Outcome: Progressing     Problem: Alteration in Thoughts and Perception  Goal: Recognize dysfunctional thoughts, communicate reality-based thoughts at the time of discharge  Description  Interventions:  - Provide medication and psycho-education to assist patient in compliance and developing insight into his/her illness   Outcome: Not Progressing  Goal: Complete daily ADLs, including personal hygiene independently, as able  Description  Interventions:  - Observe, teach, and assist patient with ADLS  - Monitor and promote a balance of rest/activity, with adequate nutrition and elimination   Outcome: Not Progressing   Patient was seen by Dr Onelia Chaparro again this morning  He explained to her that she has a aspiration pneumonitis  Because she has a large hiatal hernia when she lays down after she eats food to moving from her stomach to her right lung  Patient was educated on trying to stay out of bed more and increasing her activity  The patient stated "well I can't stay up all day"  This writer made her aware that most people stay out of bed all day and she stated "well, I'm not most people "  She was medication and meal complaint  Continues to be somatic and focused on her pulse oximetry and other "physical ailments"  Patient ate 75% of breakfast which consisted of oatmeal, yogurt, a muffin and her coffee milk and juice    Patient was noted to be laying in bed after breakfast this wirter greatly encouraged her get get OOB and sit up  However, she refused and stated "I'll get up for IMR group at 1100  She did attend and participate in IMR group this shift  Patient then ate lunch but she only ate about 15% which consisted of rice pudding, apple juice and milk  She went into her room and closed the door so this writer did not see her lay down  This writer went into her room and asked her why she is laying in bed  She stated "I'm tired and I have to lay down until the next group starts "  This writer re educated her on why she needs to stay out of bed  She verbalized understanding but would not get OOB  Continue to monitor

## 2019-12-11 NOTE — PROGRESS NOTES
Progress Note - Rosana Lindsay 1955, 60 y o  female MRN: [id number]    Unit/Bed#: [facility] 110-02 Encounter: [id number]    Primary Care Provider: Bethanie Zaragoza PA-C   Date and time admitted to hospital: 7/23/2019  5:30 PM        * Schizoaffective disorder, bipolar type [facility])  Assessment & Plan  Psychiatry Progress Note  Patient was seen by [facility] group and had x-ray as well as CT scan of chest which showed right lower lobe of her lung with either pleural effusion or pneumonia and was seen by pulmonary who did not want to do anything at this time as she had the same lesion on the x-ray from August as well    She  is still psychosomatic claiming that she cannot swallow or has low blood sugar or she is going to die if she takes a shower and also believes that people are tampering with her medications or with the oxygen concentrator all her spirometer and these are all fixed beliefs and even convinced 1 of the night shift to check her sugar last night   Seen taken showers in 3 nights in a row and states she will try to take 1 today as she was told that the expression station is for her to take at least 2 showers a week and to attend to her ADLs skills and attend 25% of the groups No signs or sxs of agranulocytosis or myocarditis or endocarditis and she is moving her bowels so far with no issues      She has been sleeping well and has been still using the oxygen concentrator at night and reports that she is happy to hear that she can go back to the  personal care home she came from if she keeps up with her improvement or face transfer to the [facility]   She was found sitting in the dining arrieta today when approached and was trying to talk to the medical resident inviting him to join her and her sister for a social outing pointing towards her delusional thinking that he is her boyfriend and I did redirect her stating that such interaction is not allowed he and that she should respect the patient provided boundaries   Current medications:    Current Facility-Administered Medications:     acetaminophen (TYLENOL) tablet 650 mg, 650 mg, Oral, Q6H PRN, Roberto Colorado MD    albuterol (PROVENTIL HFA,VENTOLIN HFA) inhaler 2 puff, 2 puff, Inhalation, Q4H PRN, Roberto Colorado MD, 2 puff at 10/11/19 0424    aluminum-magnesium hydroxide-simethicone (MYLANTA) 200-200-20 mg/5 mL oral suspension 15 mL, 15 mL, Oral, Q4H PRN, Roberto Colorado MD    ammonium lactate (LAC-HYDRIN) 12 % lotion 1 application, 1 application, Topical, BID PRN, Roberto Colorado MD    benzonatate (TESSALON PERLES) capsule 100 mg, 100 mg, Oral, TID PRN, Roberto Colorado MD    benztropine (COGENTIN) injection 1 mg, 1 mg, Intramuscular, Q8H PRN, Roberto Colorado MD    carbamide peroxide (DEBROX) 6 5 % otic solution 5 drop, 5 drop, Left Ear, BID, Rona Raymundo MD, 5 drop at 12/11/19 0819    cloZAPine (CLOZARIL) tablet 25 mg, 25 mg, Oral, BID, Roberto Colorado MD, 25 mg at 12/10/19 2057    cloZAPine (CLOZARIL) tablet 50 mg, 50 mg, Oral, BID, Roberto Colorado MD, 50 mg at 12/10/19 2057    EPINEPHrine PF (ADRENALIN) 1 mg/mL injection 0 15 mg, 0 15 mg, Intramuscular, Once PRN, Roberto Colorado MD    fluticasone-vilanterol (BREO ELLIPTA) 200-25 MCG/INH inhaler 1 puff, 1 puff, Inhalation, Daily, Roberto Colorado MD, 1 puff at 12/11/19 0819    ketotifen (ZADITOR) 0 025 % ophthalmic solution 1 drop, 1 drop, Right Eye, BID PRN, Roberto Colorado MD    levothyroxine tablet 125 mcg, 125 mcg, Oral, Early Morning, Roberto Colorado MD, 125 mcg at 12/11/19 0600    magnesium hydroxide (MILK OF MAGNESIA) 400 mg/5 mL oral suspension 30 mL, 30 mL, Oral, Daily PRN, Roberto Colorado MD    montelukast (SINGULAIR) tablet 10 mg, 10 mg, Oral, HS, Roberto Colorado MD, 10 mg at 11/12/19 2141    OLANZapine (ZyPREXA) IM injection 5 mg, 5 mg, Intramuscular, Q8H PRN, Roberto Colorado MD    OLANZapine (ZyPREXA) tablet 5 mg, 5 mg, Oral, Q8H PRN, Roberto Colorado MD    ondansetron (ZOFRAN-ODT) dispersible tablet 4 mg, 4 mg, Oral, Q6H PRN, Manuel Copeland MD, 4 mg at 12/09/19 1757    pantoprazole (PROTONIX) EC tablet 40 mg, 40 mg, Oral, Early Morning, Manuel Copeland MD, 40 mg at 12/11/19 0600    polyethylene glycol (MIRALAX) packet 17 g, 17 g, Oral, Daily PRN, Manuel Copeland MD    polyvinyl alcohol (LIQUIFILM TEARS) 1 4 % ophthalmic solution 1 drop, 1 drop, Both Eyes, Q3H PRN, Manuel Copeland MD    sertraline (ZOLOFT) tablet 50 mg, 50 mg, Oral, BID, Manuel Copeland MD, 50 mg at 12/11/19 2614    sucralfate (CARAFATE) oral suspension 1,000 mg, 1,000 mg, Oral, BID, Cat Torres MD, 1,000 mg at 12/11/19 0600    theophylline (JEF-24) 24 hr capsule 200 mg, 200 mg, Oral, Daily, Manuel Copeland MD, 200 mg at 12/11/19 0819    tiotropium (SPIRIVA) capsule for inhaler 18 mcg, 18 mcg, Inhalation, Daily, Manuel Copeland MD, 18 mcg at 12/11/19 0819    traZODone (DESYREL) tablet 25 mg, 25 mg, Oral, HS PRN, Manuel Copeland MD  Justification if on more than two antipsychotics:  Only on his clozapine  Side effects if any:  None    Risks , benefits, side effects and precautions of medications discussed with patient and reminded patient to let us know any problems with medications     Objective:     Vital Signs:  Vitals:    12/10/19 2100 12/11/19 0607 12/11/19 0730 12/11/19 0735   BP:   105/56 (!) 88/52   BP Location:   Left arm Left arm   Pulse: 98  73 64   Resp:   16 16   Temp:  98 4 °F (36 9 °C) 98 6 °F (37 °C)    TempSrc:  Temporal Temporal    SpO2: (!) 88% 94%     Weight:       Height:         Appearance:  age appropriate, casually dressed and overweight older than stated age, friendly not too pleasant  with hair uncombed l with a body odorr and found sitting in the dining arrieta   Behavior:  evasive and guarded and suspicious and preoccupied with psychosomatic complaints appearing anxious and cooperative no longer wearing the facial mask   Speech:  normal pitch and normal volume but circumstantial and tangential with stilted speech    Mood:  anxious and dysthymic   Affect:  mood-congruent, elated and entitled anxious and irritated and sad at times but pleasant today    Thought Process:  goal directed and illogical slightly pressured and tangential and talks as if in a court of law   Thought Content:  Still with fixed psychosomatic beliefs that she cannot breathe or cannot swallow or having  Hypoglycemia or having cancer and dying from some terrible disease  No current suicidal homicidal thoughts intent or plans reported  No phobias obsessions or compulsions reported  Still accusatory  to certain staff and fixated on multiple physical complaints still believes that the medical resident is a boyfriend  Perceptual Disturbances: None and does not appear responding to internal stimuli at times   Risk Potential: Tendency to not care for herself    Sensorium:  person, place, time/date, situation, day of week, month of year and time   Cognition:  grossly intact   Consciousness:  alert and awake    Attention: Intact concentration and attention span   Intellect: Considered to be at least of average intelligence   Insight:  limited and in denial of her illness   Judgment: poor      Motor Activity: no abnormal movements         Recent Labs:  Results Reviewed     None          I/O Past 24 hours:  No intake/output data recorded  No intake/output data recorded          Assessment / Plan:     [facility]ffective [facility]      Reason for continued inpatient care:  Because of underlying paranoia and refusal to go back to the personal care home and inability to care for herself on her own  Acceptance by patient:  Accepting  Juanakshat Gonzalez in recovery:  About living in another personal care home once she feels better  Understanding of medications :  Has some understanding  Involved in reintegration process:  Adjusting to the unit  Trusting in relatoinship with psychiatrist:  Trusting    Recommended Treatment:    Medication changes:  1) none today  Non-pharmacological treatments  1) continue with  individual therapy group therapy, milieu therapy and occupational therapy and milieu therapy involving multidisciplinary team approach with psychotherapist, case management, nursing, peer support services, art therapist etc using recovery principles and psycho-education about accepting illness and need for treatment   2) encourage to cocoperate with behavior plan  3) encourage to attend to ADLs like taking showers and wearing clean clothes   4) Encourage to attend groups   Safety  1) Safety/communication plan established targeting dynamic risk factors above  Discharge Plan most likely back to the a:  Personal care boarding home with act services once her delusions are managed and mood becomes more stabilized and she attends to her ADLs she becomes more receptive to treatment compliance but she has not shown any improvement in the last 5 months since she has been on the unit and hence the referral needs to be initiated for the  [address] [place] [address] / Coordination of Care    Total floor / unit time spent today 15 minutes  Greater than 50% of total time was spent with the patient and / or family counseling and / or coordination of care  A description of the counseling / coordination of care  Patient's Rights, confidentiality and exceptions to confidentiality, use of automated medical record, [address] [place] [address] staff access to medical record, and consent to treatment reviewed      Oval MD Joseluis

## 2019-12-11 NOTE — PROGRESS NOTES
Not scheduled to attend      12/10/19 1430   Activity/Group Checklist   Group   (Community Programming Wrap Up )   Attendance Did not attend   Attendance Duration (min) 16-30   Affect/Mood IRMA

## 2019-12-11 NOTE — PROGRESS NOTES
Patient participated/Engaged/ Doing much better with attendance and participation      12/11/19 1100   Activity/Group Checklist   Group   (IMR)   Attendance Attended   Attendance Duration (min) 46-60   Interactions Interacted appropriately   Affect/Mood Appropriate;Normal range;Bright   Goals Achieved Identified feelings; Discussed coping strategies; Identified resources and support systems; Able to listen to others; Able to engage in interactions; Able to reflect/comment on own behavior;Able to self-disclose

## 2019-12-11 NOTE — PROGRESS NOTES
Progress Note - Pulmonary   James Flatter 60 y o  female MRN: [id number]  Unit/Bed#: [facility] 110-02 Encounter: [id number]    Assessment/Plan:  1  Hemoptysis  · Only 1 episode yesterday  · Suspect secondary to dryness  · If reoccurs consider bronchoscopy  2  Right lower lobe abnormality  · CT of the chest with right small pleural effusion suspect secondary to aspiration pneumonitis  · Patient has large hiatal hernia  · Afebrile, hemodynamically stable  · Denies sputum  · Elevate head of bed 30° at night  · Recommend surgery consult  · Continue to monitor  3  COPD without exacerbation  · Continue Breo 1 inhalation daily, and Spiriva once a day    ----------------------------------------------------------------------------------------------------------------------    HPI/Interval History:   " I feel tired "    Vitals:   Blood pressure 111/68, pulse 98, temperature 98 4 °F (36 9 °C), temperature source Temporal, resp  rate 16, height 5' 4" (1 626 m), weight 65 6 kg (144 lb 9 6 oz), SpO2 94 %, not currently breastfeeding  ,Body mass index is 24 82 kg/m²  SpO2: 94 %  SpO2 Activity: At Rest  O2 Device: None (Room air)    Physical Exam:   Gen:  Appears stated age and in no respiratory distress  Comfortable  HEENT:  NC/NT, PERRLA, EOMI, no scleral icterus  Nares normal no drainage  Oropharynx clear with moist mucous membranes  Neck:  Supple, EF ROM  Trachea midline  No JVD  Chest:  CTA  No wheezing or rhonchi  Cardiac:  RRR  S1, S2 normal   No murmurs or gallops  Abdomen:  Soft, NT, positive bowel sounds in all 4 quadrants  Extremities:  No edema or cyanosis    Extremities normal      Labs:    CBC:  Results from last 7 days   Lab Units 12/06/19  0746   WBC Thousand/uL 9 10   HEMOGLOBIN g/dL 14 5   HEMATOCRIT % 43 3   PLATELETS Thousands/uL 255         BMP:   Lab Results   Component Value Date    SODIUM 140 10/25/2019    K 4 1 10/25/2019    CO2 29 10/25/2019     10/25/2019    BUN 16 10/25/2019    CREATININE 0 75 10/25/2019    GLUCOSE 85 05/18/2015    CALCIUM 9 1 10/25/2019           Imaging studies:  12/10/2019 Ct Chest without IV contrast Right lower and middle lobe opacities as described with small right pleural effusion   This may represent pneumonia, aspiration         Sallie Navdeep, CRNP

## 2019-12-12 NOTE — ASSESSMENT & PLAN NOTE
Psychiatry Progress Note  Patient took a shower yesterday and did announce it proudly today  She has been trying to attend 1-2 groups a day and states that she rather go back to the personal care home [facility]   She was again reminded about the expectation for her to take at least 2 showers a week and to attend to her ADLs skills and attend 25% of the groups No signs or sxs of agranulocytosis or myocarditis or endocarditis and she is moving her bowels so far with no issues   However she still has multiple psychosomatic complaints as before like dying from some terrible illness, having low blood sugar, inability to swallow or breathe etc   She continues to believe that a medical resident is a boyfriend    She has been sleeping well and has been still using the oxygen concentrator at night and reports that she is happy to hear that she can go back to the  personal care home she came from if she keeps up with her improvement or face transfer to the [facility]   She was found sitting in the dining arrieta today when approached   Current medications:    Current Facility-Administered Medications:     acetaminophen (TYLENOL) tablet 650 mg, 650 mg, Oral, Q6H PRN, Dalton Garner MD    albuterol (PROVENTIL HFA,VENTOLIN HFA) inhaler 2 puff, 2 puff, Inhalation, Q4H PRN, Dalton Garner MD, 2 puff at 10/11/19 0424    aluminum-magnesium hydroxide-simethicone (MYLANTA) 200-200-20 mg/5 mL oral suspension 15 mL, 15 mL, Oral, Q4H PRN, Dalton Garner MD    ammonium lactate (LAC-HYDRIN) 12 % lotion 1 application, 1 application, Topical, BID PRN, Dalton Garner MD    benzonatate (TESSALON PERLES) capsule 100 mg, 100 mg, Oral, TID PRN, Daltno Garner MD    benztropine (COGENTIN) injection 1 mg, 1 mg, Intramuscular, Q8H PRN, Dalton Garner MD    carbamide peroxide (DEBROX) 6 5 % otic solution 5 drop, 5 drop, Left Ear, BID, Rona Correia MD, 5 drop at 12/12/19 0902    cloZAPine (CLOZARIL) tablet 25 mg, 25 mg, Oral, BID, Jill Gayle MD, 25 mg at 12/11/19 2139    cloZAPine (CLOZARIL) tablet 50 mg, 50 mg, Oral, BID, Jill Gayle MD, 50 mg at 12/11/19 2139    EPINEPHrine PF (ADRENALIN) 1 mg/mL injection 0 15 mg, 0 15 mg, Intramuscular, Once PRN, Jill Gayle MD    fluticasone-vilanterol (BREO ELLIPTA) 200-25 MCG/INH inhaler 1 puff, 1 puff, Inhalation, Daily, Jill Gayle MD, 1 puff at 12/12/19 0903    ketotifen (ZADITOR) 0 025 % ophthalmic solution 1 drop, 1 drop, Right Eye, BID PRN, Jill Gayle MD    levothyroxine tablet 125 mcg, 125 mcg, Oral, Early Morning, Jill Gayle MD, 125 mcg at 12/12/19 1045    magnesium hydroxide (MILK OF MAGNESIA) 400 mg/5 mL oral suspension 30 mL, 30 mL, Oral, Daily PRN, Jill Gayle MD    montelukast (SINGULAIR) tablet 10 mg, 10 mg, Oral, HS, Jill Gayle MD, 10 mg at 11/12/19 2141    OLANZapine (ZyPREXA) IM injection 5 mg, 5 mg, Intramuscular, Q8H PRN, Jill Gayle MD    OLANZapine (ZyPREXA) tablet 5 mg, 5 mg, Oral, Q8H PRN, Jill Gayle MD    ondansetron (ZOFRAN-ODT) dispersible tablet 4 mg, 4 mg, Oral, Q6H PRN, Jill Gayle MD, 4 mg at 12/09/19 1757    pantoprazole (PROTONIX) EC tablet 40 mg, 40 mg, Oral, Early Morning, Jill Gayle MD, 40 mg at 12/12/19 3252    polyethylene glycol (MIRALAX) packet 17 g, 17 g, Oral, Daily PRN, Jill Gayle MD    polyvinyl alcohol (LIQUIFILM TEARS) 1 4 % ophthalmic solution 1 drop, 1 drop, Both Eyes, Q3H PRN, Jill Gayle MD    sertraline (ZOLOFT) tablet 50 mg, 50 mg, Oral, BID, Jill Gayle MD, 50 mg at 12/12/19 0846    sucralfate (CARAFATE) oral suspension 1,000 mg, 1,000 mg, Oral, BID, Cat Torres MD, 1,000 mg at 12/12/19 5405    theophylline (JEF-24) 24 hr capsule 200 mg, 200 mg, Oral, Daily, Jill Gayle MD, 200 mg at 12/12/19 0846    tiotropium (SPIRIVA) capsule for inhaler 18 mcg, 18 mcg, Inhalation, Daily, Jill Gayle MD, 18 mcg at 12/12/19 0903    traZODone (DESYREL) tablet 25 mg, 25 mg, Oral, HS PRN, Jill Gayle, MD  Justification if on more than two antipsychotics:  Only on his clozapine  Side effects if any:  None    Risks , benefits, side effects and precautions of medications discussed with patient and reminded patient to let us know any problems with medications     Objective:     Vital Signs:  Vitals:    12/11/19 1638 12/11/19 2021 12/12/19 0730 12/12/19 0732   BP: 103/55 125/70 100/61 92/64   BP Location: Right arm Left arm Left arm Left arm   Pulse: 85 89 75 71   Resp: 16 18 18    Temp: (!) 97 3 °F (36 3 °C) 98 6 °F (37 °C) 97 6 °F (36 4 °C)    TempSrc: Temporal Temporal Temporal    SpO2: 90% 90%     Weight:       Height:         Appearance:  age appropriate, casually dressed and overweight older than stated age, friendly not too pleasant  with hair uncombed but better groomed with no body odor today and found sitting in the dining arrieta   Behavior:  evasive and guarded and suspicious and preoccupied with psychosomatic complaints as usual   Speech:  normal pitch and normal volume but circumstantial and tangential with stilted speech    Mood:  anxious and dysthymic   Affect:  mood-congruent, elated and entitled anxious and irritated and sad at times but pleasant today    Thought Process:  goal directed and illogical slightly pressured and tangential and talks as if in a court of law   Thought Content:  Still with fixed psychosomatic beliefs that she cannot breathe or cannot swallow or having  Hypoglycemia or having cancer and dying from some terrible disease  Still believes that the medical resident is have boyfriend  No current suicidal homicidal thoughts intent or plans reported  No phobias obsessions or compulsions reported    Still accusatory  to certain staff and fixated on multiple physical complaints as usual   Perceptual Disturbances: None and does not appear responding to internal stimuli at times   Risk Potential: Tendency to not care for herself    Sensorium:  person, place, time/date, situation, day of week, month of year and time   Cognition:  grossly intact   Consciousness:  alert and awake    Attention: Intact concentration and attention span   Intellect: Considered to be at least of average intelligence   Insight:  limited and in denial of her illness   Judgment: poor      Motor Activity: no abnormal movements         Recent Labs:  Results Reviewed     None          I/O Past 24 hours:  No intake/output data recorded  No intake/output data recorded  Assessment / Plan:     Schizoaffective disorder, [facility]      Reason for continued inpatient care:  Because of underlying paranoia and refusal to go back to the personal care home and inability to care for herself on her own  Acceptance by patient:  Accepting  Rosa Noble in recovery:  About living in another personal care home once she feels better  Understanding of medications :  Has some understanding  Involved in reintegration process:  Adjusting to the unit  Trusting in relatoinship with psychiatrist:  Trusting    Recommended Treatment:    Medication changes:  1) none today  Non-pharmacological treatments  1) continue with  individual therapy group therapy, milieu therapy and occupational therapy and milieu therapy involving multidisciplinary team approach with psychotherapist, case management, nursing, peer support services, art therapist etc using recovery principles and psycho-education about accepting illness and need for treatment   2) encourage to cocoperate with behavior plan  3) encourage to attend to ADLs like taking showers and wearing clean clothes   4) Encourage to attend groups   Safety  1) Safety/communication plan established targeting dynamic risk factors above    Discharge Plan most likely back to the a:  Personal care boarding home with act services once her delusions are managed and mood becomes more stabilized and she attends to her ADLs she becomes more receptive to treatment compliance but she has not shown any improvement in the last 5 months since she has been on the unit and hence the referral needs to be initiated for the  [address] [place] [address] / Coordination of Care    Total floor / unit time spent today 15 minutes  Greater than 50% of total time was spent with the patient and / or family counseling and / or coordination of care  A description of the counseling / coordination of care  Patient's Rights, confidentiality and exceptions to confidentiality, use of automated medical record, [facility] staff access to medical record, and consent to treatment reviewed      Tree Madden MD

## 2019-12-12 NOTE — PROGRESS NOTES
12/12/19 0900   Team Meeting   Meeting Type Daily Rounds   Team Members Present   Team Members Present Physician;Nurse;; Other (Discipline and Name)   Physician Team Member Dr Rosa Jimenez Team Member Jewels Snyder RN   Care Management Team Member Brenda So   Other (Discipline and Name) Meryle Laws, CPS     Patient has been about of bed and attending more groups  Pneumonitis and has a surgical consult for hernia  Upset she hasn't earned passes for [employer]

## 2019-12-12 NOTE — PLAN OF CARE
Problem: Alteration in Thoughts and Perception  Goal: Verbalize thoughts and feelings  Description  Interventions:  - Promote a nonjudgmental and trusting relationship with the patient through active listening and therapeutic communication  - Assess patient's level of functioning, behavior and potential for risk  - Engage patient in 1 on 1 interactions for a minimum of 15 minutes each session  - Encourage patient to express fears, feelings, frustrations, and discuss symptoms    - [place] patient to reality, help patient recognize reality-based thinking   - Administer medications as ordered and assess for potential side effects  - Provide the patient education related to the signs and symptoms of the illness and desired effects of prescribed medications  12/12/2019 1444 by Liza Justice RN  Outcome: Progressing  12/12/2019 1404 by Liza Justice RN  Outcome: Progressing  Goal: Agree to be compliant with medication regime, as prescribed and report medication side effects  Description  Interventions:  - Offer appropriate PRN medication and supervise ingestion; conduct aims, as needed   12/12/2019 1444 by Liza Justice RN  Outcome: Progressing  12/12/2019 1404 by Liza Justice RN  Outcome: Progressing  Goal: Recognize dysfunctional thoughts, communicate reality-based thoughts at the time of discharge  Description  Interventions:  - Provide medication and psycho-education to assist patient in compliance and developing insight into his/her illness   Outcome: Progressing  Goal: Complete daily ADLs, including personal hygiene independently, as able  Description  Interventions:  - Observe, teach, and assist patient with ADLS  - Monitor and promote a balance of rest/activity, with adequate nutrition and elimination   Outcome: Progressing     Problem: Risk for Self Injury/Neglect  Goal: Verbalize thoughts and feelings  Description  Interventions:  - Assess and re-assess patient's lethality and potential for self-injury  - Engage patient in 1:1 interactions, daily, for a minimum of 15 minutes  - Encourage patient to express feelings, fears, frustrations, hopes  - Establish rapport/trust with patient   Outcome: Progressing  Goal: Refrain from harming self  Description  Interventions:  - Monitor patient closely, per order  - Develop a trusting relationship  - Supervise medication ingestion, monitor effects and side effects   Outcome: Ileana Murray has been intermittently visible, attending to select groups and somewhat social with peers and with staff  Less somatically preoccupied this shift, choosing to sleep in the her bedroom  Behaviors controlled and appropriate

## 2019-12-12 NOTE — PROGRESS NOTES
12/12/19 1100   Activity/Group Checklist   Group   (IMR/Coping Skills )   Attendance Attended   Attendance Duration (min) 46-60   Interactions Interacted appropriately   Affect/Mood Appropriate;Calm;Normal range   Goals Achieved Identified feelings; Increased hopefulness; Able to listen to others; Able to engage in interactions; Able to self-disclose

## 2019-12-12 NOTE — PLAN OF CARE
Problem: PAIN - ADULT  Goal: Verbalizes/displays adequate comfort level or baseline comfort level  Description  Interventions:  - Encourage patient to monitor pain and request assistance  - Assess pain using appropriate pain scale  - Administer analgesics based on type and severity of pain and evaluate response  - Implement non-pharmacological measures as appropriate and evaluate response  - Consider cultural and social influences on pain and pain management  - Notify physician/advanced practitioner if interventions unsuccessful or patient reports new pain  Outcome: Progressing     Problem: SAFETY ADULT  Goal: Patient will remain free of falls  Description  INTERVENTIONS:  - Assess patient frequently for physical needs  -  Identify cognitive and physical deficits and behaviors that affect risk of falls    -  [place] fall precautions as indicated by assessment   - Educate patient/family on patient safety including physical limitations  - Instruct patient to call for assistance with activity based on assessment  - Modify environment to reduce risk of injury  - Consider OT/PT consult to assist with strengthening/mobility  Outcome: Progressing     Problem: RESPIRATORY - ADULT  Goal: Achieves optimal ventilation and oxygenation  Description  INTERVENTIONS:  - Assess for changes in respiratory status  - Assess for changes in mentation and behavior  - Position to facilitate oxygenation and minimize respiratory effort  - Oxygen administration by appropriate delivery method based on oxygen saturation (per order) or ABGs  - Initiate smoking cessation education as indicated  - Encourage broncho-pulmonary hygiene including cough, deep breathe, Incentive Spirometry  - Assess the need for suctioning and aspirate as needed  - Assess and instruct to report SOB or any respiratory difficulty  - Respiratory Therapy support as indicated  Outcome: Progressing     Problem: SLEEP DISTURBANCE  Goal: Will exhibit normal sleeping pattern  Description  Interventions:  -  Assess the patients sleep pattern, noting recent changes  - Administer medication as ordered  - Decrease environmental stimuli, including noise, as appropriate during the night  - Encourage the patient to actively participate in unit groups and or exercise during the day to enhance ability to achieve adequate sleep at night  - Assess the patient, in the morning, encouraging a description of sleep experience  Outcome: Progressing    Alice Villalta in bed with eyes closed, breath even and unlabored   On O2 with humidifier @1L/m via nasal cannula   Q 15 minutes rounding   Maintained on ongoing fall and SAFE precaution   No somatic complaint overnight   No respiratory distress   Will continue to monitor

## 2019-12-12 NOTE — PROGRESS NOTES
Progress Note - Libby Costello 1955, 60 y o  female MRN: [id number]    [facility] Encounter: [id number]    Primary Care Provider: Yobani Duffy PA-C   Date and time admitted to hospital: 7/23/2019  5:30 PM        * Schizoaffective disorder, bipolar type [facility])  Assessment & Plan  Psychiatry Progress Note  Patient took a shower yesterday and did announce it proudly today  She has been trying to attend 1-2 groups a day and states that she rather go back to the personal care home rather than to the [facility] hospital   She was again reminded about the expectation for her to take at least 2 showers a week and to attend to her ADLs skills and attend 25% of the groups No signs or sxs of agranulocytosis or myocarditis or endocarditis and she is moving her bowels so far with no issues   However she still has multiple psychosomatic complaints as before like dying from some terrible illness, having low blood sugar, inability to swallow or breathe etc   She continues to believe that a medical resident is a boyfriend    She has been sleeping well and has been still using the oxygen concentrator at night and reports that she is happy to hear that she can go back to the  personal care home she came from if she keeps up with her improvement or face transfer to the [facility]   She was found sitting in the dining arrieta today when approached   Current medications:    Current Facility-Administered Medications:     acetaminophen (TYLENOL) tablet 650 mg, 650 mg, Oral, Q6H PRN, Meldon Curling, MD    albuterol (PROVENTIL HFA,VENTOLIN HFA) inhaler 2 puff, 2 puff, Inhalation, Q4H PRN, Meldon Curling, MD, 2 puff at 10/11/19 0424    aluminum-magnesium hydroxide-simethicone (MYLANTA) 200-200-20 mg/5 mL oral suspension 15 mL, 15 mL, Oral, Q4H PRN, Meldon Curling, MD    ammonium lactate (LAC-HYDRIN) 12 % lotion 1 application, 1 application, Topical, BID PRN, Meldon Curling, MD    benzonatate (TESSALON PERLES) capsule 100 mg, 100 mg, Oral, TID PRN, Zac Sierra MD    benztropine (COGENTIN) injection 1 mg, 1 mg, Intramuscular, Q8H PRN, Zac Sierra MD    carbamide peroxide (DEBROX) 6 5 % otic solution 5 drop, 5 drop, Left Ear, BID, Rona Roy MD, 5 drop at 12/12/19 0902    cloZAPine (CLOZARIL) tablet 25 mg, 25 mg, Oral, BID, Zac Sierra MD, 25 mg at 12/11/19 2139    cloZAPine (CLOZARIL) tablet 50 mg, 50 mg, Oral, BID, Zac Sierra MD, 50 mg at 12/11/19 2139    EPINEPHrine PF (ADRENALIN) 1 mg/mL injection 0 15 mg, 0 15 mg, Intramuscular, Once PRN, Zac Sierra MD    fluticasone-vilanterol (BREO ELLIPTA) 200-25 MCG/INH inhaler 1 puff, 1 puff, Inhalation, Daily, Zac Sierra MD, 1 puff at 12/12/19 0903    ketotifen (ZADITOR) 0 025 % ophthalmic solution 1 drop, 1 drop, Right Eye, BID PRN, Zac Sierra MD    levothyroxine tablet 125 mcg, 125 mcg, Oral, Early Morning, Zac Sierra MD, 125 mcg at 12/12/19 4730    magnesium hydroxide (MILK OF MAGNESIA) 400 mg/5 mL oral suspension 30 mL, 30 mL, Oral, Daily PRN, Zac Sierra MD    montelukast (SINGULAIR) tablet 10 mg, 10 mg, Oral, HS, Zac Sierra MD, 10 mg at 11/12/19 2141    OLANZapine (ZyPREXA) IM injection 5 mg, 5 mg, Intramuscular, Q8H PRN, Zac Sierra MD    OLANZapine (ZyPREXA) tablet 5 mg, 5 mg, Oral, Q8H PRN, Zac Sierra MD    ondansetron (ZOFRAN-ODT) dispersible tablet 4 mg, 4 mg, Oral, Q6H PRN, Zac Sierra MD, 4 mg at 12/09/19 1757    pantoprazole (PROTONIX) EC tablet 40 mg, 40 mg, Oral, Early Morning, Zac Sierra MD, 40 mg at 12/12/19 7840    polyethylene glycol (MIRALAX) packet 17 g, 17 g, Oral, Daily PRN, Zac Sierra MD    polyvinyl alcohol (LIQUIFILM TEARS) 1 4 % ophthalmic solution 1 drop, 1 drop, Both Eyes, Q3H PRN, Zac Sierra MD    sertraline (ZOLOFT) tablet 50 mg, 50 mg, Oral, BID, Zac Sierra MD, 50 mg at 12/12/19 4739    sucralfate (CARAFATE) oral suspension 1,000 mg, 1,000 mg, Oral, BID, Yani Toribio MD, 1,000 mg at 12/12/19 5654   theophylline (JEF-24) 24 hr capsule 200 mg, 200 mg, Oral, Daily, Roberto Colorado MD, 200 mg at 12/12/19 0846    tiotropium (SPIRIVA) capsule for inhaler 18 mcg, 18 mcg, Inhalation, Daily, Roberto Colorado MD, 18 mcg at 12/12/19 3895    traZODone (DESYREL) tablet 25 mg, 25 mg, Oral, HS PRN, Roberto Colorado MD  Justification if on more than two antipsychotics:  Only on his clozapine  Side effects if any:  None    Risks , benefits, side effects and precautions of medications discussed with patient and reminded patient to let us know any problems with medications     Objective:     Vital Signs:  Vitals:    12/11/19 1638 12/11/19 2021 12/12/19 0730 12/12/19 0732   BP: 103/55 125/70 100/61 92/64   BP Location: Right arm Left arm Left arm Left arm   Pulse: 85 89 75 71   Resp: 16 18 18    Temp: (!) 97 3 °F (36 3 °C) 98 6 °F (37 °C) 97 6 °F (36 4 °C)    TempSrc: Temporal Temporal Temporal    SpO2: 90% 90%     Weight:       Height:         Appearance:  age appropriate, casually dressed and overweight older than stated age, friendly not too pleasant  with hair uncombed but better groomed with no body odor today and found sitting in the dining arrieta   Behavior:  evasive and guarded and suspicious and preoccupied with psychosomatic complaints as usual   Speech:  normal pitch and normal volume but circumstantial and tangential with stilted speech    Mood:  anxious and dysthymic   Affect:  mood-congruent, elated and entitled anxious and irritated and sad at times but pleasant today    Thought Process:  goal directed and illogical slightly pressured and tangential and talks as if in a court of law   Thought Content:  Still with fixed psychosomatic beliefs that she cannot breathe or cannot swallow or having  Hypoglycemia or having cancer and dying from some terrible disease  Still believes that the medical resident is have boyfriend  No current suicidal homicidal thoughts intent or plans reported    No phobias obsessions or compulsions reported  Still accusatory  to certain staff and fixated on multiple physical complaints as usual   Perceptual Disturbances: None and does not appear responding to internal stimuli at times   Risk Potential: Tendency to not care for herself    Sensorium:  person, place, time/date, situation, day of week, month of year and time   Cognition:  grossly intact   Consciousness:  alert and awake    Attention: Intact concentration and attention span   Intellect: Considered to be at least of average intelligence   Insight:  limited and in denial of her illness   Judgment: poor      Motor Activity: no abnormal movements         Recent Labs:  Results Reviewed     None          I/O Past 24 hours:  No intake/output data recorded  No intake/output data recorded  Assessment / Plan:     [facility]ffective [facility]      Reason for continued inpatient care:  Because of underlying paranoia and refusal to go back to the personal care home and inability to care for herself on her own  Acceptance by patient:  Accepting  [place] File in recovery:  About living in another personal care home once she feels better  Understanding of medications :  Has some understanding  Involved in reintegration process:  Adjusting to the unit  Trusting in relatoinship with psychiatrist:  Trusting    Recommended Treatment:    Medication changes:  1) none today  Non-pharmacological treatments  1) continue with  individual therapy group therapy, milieu therapy and occupational therapy and milieu therapy involving multidisciplinary team approach with psychotherapist, case management, nursing, peer support services, art therapist etc using recovery principles and psycho-education about accepting illness and need for treatment     2) encourage to cocoperate with behavior plan  3) encourage to attend to ADLs like taking showers and wearing clean clothes   4) Encourage to attend groups   Safety  1) Safety/communication plan established targeting dynamic risk factors above  Discharge Plan most likely back to the a:  Personal care boarding home with act services once her delusions are managed and mood becomes more stabilized and she attends to her ADLs she becomes more receptive to treatment compliance but she has not shown any improvement in the last 5 months since she has been on the unit and hence the referral needs to be initiated for the  [address] [place] [address] / Coordination of Care    Total floor / unit time spent today 15 minutes  Greater than 50% of total time was spent with the patient and / or family counseling and / or coordination of care  A description of the counseling / coordination of care  Patient's Rights, confidentiality and exceptions to confidentiality, use of automated medical record, [facility] staff access to medical record, and consent to treatment reviewed      Roberto Shankar MD

## 2019-12-12 NOTE — PLAN OF CARE
Problem: Alteration in Thoughts and Perception  Goal: Verbalize thoughts and feelings  Description  Interventions:  - Promote a nonjudgmental and trusting relationship with the patient through active listening and therapeutic communication  - Assess patient's level of functioning, behavior and potential for risk  - Engage patient in 1 on 1 interactions for a minimum of 15 minutes each session  - Encourage patient to express fears, feelings, frustrations, and discuss symptoms    - [place] patient to reality, help patient recognize reality-based thinking   - Administer medications as ordered and assess for potential side effects  - Provide the patient education related to the signs and symptoms of the illness and desired effects of prescribed medications  Outcome: Progressing  Goal: Agree to be compliant with medication regime, as prescribed and report medication side effects  Description  Interventions:  - Offer appropriate PRN medication and supervise ingestion; conduct aims, as needed   Outcome: Progressing  Goal: Attend and participate in unit activities, including therapeutic, recreational, and educational groups  Description  Interventions:  - Provide therapeutic and educational activities daily, encourage attendance and participation, and document same in the medical record     CERTIFIED PEER SPECIALIST INTERVENTIONS:    Complete peer assessment with patient to assess their needs and identify their goals to complete while in the recovery program as well as once discharged into the community  Patient will complete WRAP Plan, Crisis Plan and [employer]  Patient will attend 50% of groups offered on the unit  Patient will complete a goal card weekly      Outcome: Progressing  Goal: Complete daily ADLs, including personal hygiene independently, as able  Description  Interventions:  - Observe, teach, and assist patient with ADLS  - Monitor and promote a balance of rest/activity, with adequate nutrition and elimination   Outcome: Progressing     Problem: Depression  Goal: Refrain from isolation  Description  Interventions:  - Develop a trusting relationship   - Encourage socialization   Outcome: Progressing     Problem: RESPIRATORY - ADULT  Goal: Achieves optimal ventilation and oxygenation  Description  INTERVENTIONS:  - Assess for changes in respiratory status  - Assess for changes in mentation and behavior  - Position to facilitate oxygenation and minimize respiratory effort  - Oxygen administration by appropriate delivery method based on oxygen saturation (per order) or ABGs  - Initiate smoking cessation education as indicated  - Encourage broncho-pulmonary hygiene including cough, deep breathe, Incentive Spirometry  - Assess the need for suctioning and aspirate as needed  - Assess and instruct to report SOB or any respiratory difficulty  - Respiratory Therapy support as indicated  Outcome: Progressing     [address] has been visible this shift  She sits out in arrieta in phone chair when it is not in use or in dining room  She is pleasant, but, a bit worrisome about her lung status, low O2 sats  Encouraged to be as active as possible, keep well hydrated  She was cooperative w/doing shower, shampoo (staff help w/hair care), grooming & felt better for doing it  Was disappointed that though doing more lately, hasn't done enough to earn a [employer] pass, but, willing to work toward a [employer] pass  Took all scheduled medicine except Singulair  For meal had only 240ml Ensure  For HS snack had 2 ice creams  Did attend PM Group  Used the [employer] reaching volumes of 1100ml  Wearing now her QHS humidified nasal O2 @ 1L for bed

## 2019-12-13 NOTE — PROGRESS NOTES
12/13/19 0830   Team Meeting   Meeting Type Daily Rounds   Team Members Present   Team Members Present Physician;Nurse   Physician Team Member Dr Melody Elizalde Team Member Kandice Kenny RN and Chente Franklin RN     Attended [facility] and evening group yesterday  With encouragement she is out of bed/room slightly more often  Suspicious of staff, Verbalizes that they are" negative light"  She had a good visit with her sister last evening    ANC 4 30 this AM

## 2019-12-13 NOTE — ASSESSMENT & PLAN NOTE
Psychiatry Progress Note  Patient reports that she has been trying to attend 1-2 groups a day and states that she rather go back to the personal care home rather than to the state hospital and is pleased with the fact that I may pull the a referral to the [facility] if she continues to attend 25% of groups and attend to her ADLs like taking showers at least twice a week     Compliant with medications so far including the clozapine  No signs or sxs of agranulocytosis or myocarditis or endocarditis and she is moving her bowels so far with no issues   However she still has multiple psychosomatic complaints as before like dying from some terrible illness, having low blood sugar, inability to swallow or breathe etc   She continues to believe that a medical resident is a boyfriend    She has been sleeping well and has been still using the oxygen concentrator at night and reports that she is happy to hear that she can go back to the  personal care home she came from if she keeps up with her improvement rather than be transferred to the [facility] hospital   She was found sitting in the dining arrieta today when approached and was friendly and pleasant today but poorly groomed and unkempt  Current medications:    Current Facility-Administered Medications:     acetaminophen (TYLENOL) tablet 650 mg, 650 mg, Oral, Q6H PRN, Chichi Lockett MD    albuterol (PROVENTIL HFA,VENTOLIN HFA) inhaler 2 puff, 2 puff, Inhalation, Q4H PRN, Chichi Lockett MD, 2 puff at 10/11/19 0424    aluminum-magnesium hydroxide-simethicone (MYLANTA) 200-200-20 mg/5 mL oral suspension 15 mL, 15 mL, Oral, Q4H PRN, Chichi Lockett MD    ammonium lactate (LAC-HYDRIN) 12 % lotion 1 application, 1 application, Topical, BID PRN, Chichi Lockett MD    benzonatate (TESSALON PERLES) capsule 100 mg, 100 mg, Oral, TID PRN, Chichi Lockett MD    benztropine (COGENTIN) injection 1 mg, 1 mg, Intramuscular, Q8H PRN, Chichi Lockett MD    carbamide peroxide (DEBROX) 6 5 % otic solution 5 drop, 5 drop, Left Ear, BID, Rona Mcintosh MD, 5 drop at 12/13/19 0840    cloZAPine (CLOZARIL) tablet 25 mg, 25 mg, Oral, BID, Jill Gayle MD, 25 mg at 12/12/19 2123    cloZAPine (CLOZARIL) tablet 50 mg, 50 mg, Oral, BID, Jill Gayle MD, 50 mg at 12/12/19 2123    EPINEPHrine PF (ADRENALIN) 1 mg/mL injection 0 15 mg, 0 15 mg, Intramuscular, Once PRN, Jill Gayle MD    fluticasone-vilanterol (BREO ELLIPTA) 200-25 MCG/INH inhaler 1 puff, 1 puff, Inhalation, Daily, Jill Gayle MD, 1 puff at 12/13/19 0900    ketotifen (ZADITOR) 0 025 % ophthalmic solution 1 drop, 1 drop, Right Eye, BID PRN, Jill Gayle MD    levothyroxine tablet 125 mcg, 125 mcg, Oral, Early Morning, Jill Gayle MD, 125 mcg at 12/13/19 0605    magnesium hydroxide (MILK OF MAGNESIA) 400 mg/5 mL oral suspension 30 mL, 30 mL, Oral, Daily PRN, Jill Gayle MD    montelukast (SINGULAIR) tablet 10 mg, 10 mg, Oral, HS, Jill Gayle MD, 10 mg at 11/12/19 2141    OLANZapine (ZyPREXA) IM injection 5 mg, 5 mg, Intramuscular, Q8H PRN, Jill Gayle MD    OLANZapine (ZyPREXA) tablet 5 mg, 5 mg, Oral, Q8H PRN, Jill Gayle MD    ondansetron (ZOFRAN-ODT) dispersible tablet 4 mg, 4 mg, Oral, Q6H PRN, Jill Gayle MD, 4 mg at 12/09/19 1757    pantoprazole (PROTONIX) EC tablet 40 mg, 40 mg, Oral, Early Morning, Jill Gayle MD, 40 mg at 12/13/19 0605    polyethylene glycol (MIRALAX) packet 17 g, 17 g, Oral, Daily PRN, Jill Gayle MD    polyvinyl alcohol (LIQUIFILM TEARS) 1 4 % ophthalmic solution 1 drop, 1 drop, Both Eyes, Q3H PRN, Jill Gayle MD    sertraline (ZOLOFT) tablet 50 mg, 50 mg, Oral, BID, Jill Gayle MD, 50 mg at 12/13/19 2462    sucralfate (CARAFATE) oral suspension 1,000 mg, 1,000 mg, Oral, BID, Cat Torres MD, 1,000 mg at 12/13/19 0605    theophylline (JEF-24) 24 hr capsule 200 mg, 200 mg, Oral, Daily, Jill Gayle MD, 200 mg at 12/13/19 0838    tiotropium (SPIRIVA) capsule for inhaler 18 mcg, 18 mcg, Inhalation, Daily, Roberto Shankar MD, 18 mcg at 12/13/19 0905    traZODone (DESYREL) tablet 25 mg, 25 mg, Oral, HS PRN, Roberto Shankar MD  Justification if on more than two antipsychotics:  Only on his clozapine  Side effects if any:  None    Risks , benefits, side effects and precautions of medications discussed with patient and reminded patient to let us know any problems with medications     Objective:     Vital Signs:  Vitals:    12/12/19 0730 12/12/19 0732 12/12/19 1627 12/12/19 2010   BP: 100/61 92/64 105/67 112/62   BP Location: Left arm Left arm Right arm Right arm   Pulse: 75 71 91 81   Resp: 18  14 18   Temp: 97 6 °F (36 4 °C)  (!) 97 2 °F (36 2 °C) (!) 97 2 °F (36 2 °C)   TempSrc: Temporal  Temporal Temporal   SpO2:   91% 90%   Weight:       Height:         Appearance:  age appropriate, casually dressed and overweight older than stated age, friendly not too pleasant  with hair uncombed but better groomed with no body odor today and found sitting in the dining arrieta and was polite   Behavior:  evasive and guarded and suspicious and preoccupied with psychosomatic complaints as usual   Speech:  normal pitch and normal volume but circumstantial and tangential with stilted speech    Mood:  anxious and dysthymic   Affect:  mood-congruent, elated and entitled anxious and irritated and sad at times but pleasant today    Thought Process:  goal directed and illogical slightly pressured and tangential and talks as if in a court of law   Thought Content:    Still believes that the medical resident is have boyfriend  Continues to have fixed set somatic believes as before but did not bring them up and no longer complains of spitting out blood     No current suicidal homicidal thoughts intent or plans reported  No phobias obsessions or compulsions reported    Still accusatory  to certain staff and fixated on multiple physical complaints as usual   Perceptual Disturbances: None and does not appear responding to internal stimuli at times   Risk Potential: Tendency to not care for herself    Sensorium:  person, place, time/date, situation, day of week, month of year and time   Cognition:  grossly intact   Consciousness:  alert and awake    Attention: Intact concentration and attention span   Intellect: Considered to be at least of average intelligence   Insight:  limited and in denial of her illness   Judgment: poor      Motor Activity: no abnormal movements         Recent Labs:  Results Reviewed     None          I/O Past 24 hours:  No intake/output data recorded  No intake/output data recorded  Assessment / Plan:     Schizoaffective disorder, bipolar type ([facility] [address] )      Reason for continued inpatient care:  Because of underlying paranoia and refusal to go back to the personal care home and inability to care for herself on her own  Acceptance by patient:  Accepting  Letty Peters in recovery:  About living in another personal care home once she feels better  Understanding of medications :  Has some understanding  Involved in reintegration process:  Adjusting to the unit  Trusting in relatoinship with psychiatrist:  Trusting    Recommended Treatment:    Medication changes:  1) none today  Non-pharmacological treatments  1) continue with  individual therapy group therapy, milieu therapy and occupational therapy and milieu therapy involving multidisciplinary team approach with psychotherapist, case management, nursing, peer support services, art therapist etc using recovery principles and psycho-education about accepting illness and need for treatment   2) encourage to cocoperate with behavior plan  3) encourage to attend to ADLs like taking showers and wearing clean clothes   4) Encourage to attend groups   Safety  1) Safety/communication plan established targeting dynamic risk factors above    Discharge Plan most likely back to the a:  Personal care boarding home with act services once her delusions are managed and mood becomes more stabilized and she attends to her ADLs she becomes more receptive to treatment compliance but she has not shown any improvement in the last 5 months since she has been on the unit and hence the referral needs to be initiated for the  [address] [place] [address] / Coordination of Care    Total floor / unit time spent today 15 minutes  Greater than 50% of total time was spent with the patient and / or family counseling and / or coordination of care  A description of the counseling / coordination of care  Patient's Rights, confidentiality and exceptions to confidentiality, use of automated medical record, [employer] staff access to medical record, and consent to treatment reviewed      Faheem Daniels MD

## 2019-12-13 NOTE — PROGRESS NOTES
Sleeping at this time, no distress noted  O2 on at 1L via NC   Safe precautions in place and maintained at this time, monitoring continues

## 2019-12-13 NOTE — PROGRESS NOTES
Progress Note - Lizbeth Jhaveri 1955, 60 y o  female MRN: [id number]    [facility] Encounter: [id number]    Primary Care Provider: Alonso Carmona PA-C   Date and time admitted to hospital: 7/23/2019  5:30 PM        * Schizoaffective disorder, bipolar type [facility])  Assessment & Plan  Psychiatry Progress Note  Patient reports that she has been trying to attend 1-2 groups a day and states that she rather go back to the personal care home rather than to the [facility] hospital and is pleased with the fact that I may pull the a referral to the [facility] if she continues to attend 25% of groups and attend to her ADLs like taking showers at least twice a week     Compliant with medications so far including the clozapine  No signs or sxs of agranulocytosis or myocarditis or endocarditis and she is moving her bowels so far with no issues   However she still has multiple psychosomatic complaints as before like dying from some terrible illness, having low blood sugar, inability to swallow or breathe etc   She continues to believe that a medical resident is a boyfriend  She has been sleeping well and has been still using the oxygen concentrator at night and reports that she is happy to hear that she can go back to the  personal care home she came from if she keeps up with her improvement rather than be transferred to the [facility] hospital   She was found sitting in the dining arrieta today when approached and was friendly and pleasant today but poorly groomed and unkempt    The repeat EKG short the QTc interval has decreased from 482 to 467  Current medications:    Current Facility-Administered Medications:     acetaminophen (TYLENOL) tablet 650 mg, 650 mg, Oral, Q6H PRN, Sarah Hanna MD    albuterol (PROVENTIL HFA,VENTOLIN HFA) inhaler 2 puff, 2 puff, Inhalation, Q4H PRN, Sarah Hanna MD, 2 puff at 10/11/19 0424    aluminum-magnesium hydroxide-simethicone (MYLANTA) 200-200-20 mg/5 mL oral suspension 15 mL, 15 mL, Oral, Q4H PRN, Ervin Little MD    ammonium lactate (LAC-HYDRIN) 12 % lotion 1 application, 1 application, Topical, BID PRN, Ervin Little MD    benzonatate (TESSALON PERLES) capsule 100 mg, 100 mg, Oral, TID PRN, Ervin Little MD    benztropine (COGENTIN) injection 1 mg, 1 mg, Intramuscular, Q8H PRN, Ervin Little MD    carbamide peroxide (DEBROX) 6 5 % otic solution 5 drop, 5 drop, Left Ear, BID, Rona Alvarado MD, 5 drop at 12/13/19 0840    cloZAPine (CLOZARIL) tablet 25 mg, 25 mg, Oral, BID, Ervin Little MD, 25 mg at 12/12/19 2123    cloZAPine (CLOZARIL) tablet 50 mg, 50 mg, Oral, BID, Ervin Little MD, 50 mg at 12/12/19 2123    EPINEPHrine PF (ADRENALIN) 1 mg/mL injection 0 15 mg, 0 15 mg, Intramuscular, Once PRN, Ervin Little MD    fluticasone-vilanterol (BREO ELLIPTA) 200-25 MCG/INH inhaler 1 puff, 1 puff, Inhalation, Daily, Ervin Little MD, 1 puff at 12/13/19 0900    ketotifen (ZADITOR) 0 025 % ophthalmic solution 1 drop, 1 drop, Right Eye, BID PRN, Ervin Little MD    levothyroxine tablet 125 mcg, 125 mcg, Oral, Early Morning, Ervin Little MD, 125 mcg at 12/13/19 0605    magnesium hydroxide (MILK OF MAGNESIA) 400 mg/5 mL oral suspension 30 mL, 30 mL, Oral, Daily PRN, Ervin Little MD    montelukast (SINGULAIR) tablet 10 mg, 10 mg, Oral, HS, Ervin Little MD, 10 mg at 11/12/19 2141    OLANZapine (ZyPREXA) IM injection 5 mg, 5 mg, Intramuscular, Q8H PRN, Ervin Little MD    OLANZapine (ZyPREXA) tablet 5 mg, 5 mg, Oral, Q8H PRN, Ervin Little MD    ondansetron (ZOFRAN-ODT) dispersible tablet 4 mg, 4 mg, Oral, Q6H PRN, Ervin Little MD, 4 mg at 12/09/19 1757    pantoprazole (PROTONIX) EC tablet 40 mg, 40 mg, Oral, Early Morning, Ervin Little MD, 40 mg at 12/13/19 0605    polyethylene glycol (MIRALAX) packet 17 g, 17 g, Oral, Daily PRN, Ervin Little MD    polyvinyl alcohol (LIQUIFILM TEARS) 1 4 % ophthalmic solution 1 drop, 1 drop, Both Eyes, Q3H PRN, Ervin Little MD    sertraline (ZOLOFT) tablet 50 mg, 50 mg, Oral, BID, Meredith Wesley MD, 50 mg at 12/13/19 5817    sucralfate (CARAFATE) oral suspension 1,000 mg, 1,000 mg, Oral, BID, Stiven Pryor MD, 1,000 mg at 12/13/19 0605    theophylline (JEF-24) 24 hr capsule 200 mg, 200 mg, Oral, Daily, Meredith Wesley MD, 200 mg at 12/13/19 1199    tiotropium (SPIRIVA) capsule for inhaler 18 mcg, 18 mcg, Inhalation, Daily, Meredith Wesley MD, 18 mcg at 12/13/19 0905    traZODone (DESYREL) tablet 25 mg, 25 mg, Oral, HS PRN, Meredith Wesley MD  Justification if on more than two antipsychotics:  Only on his clozapine  Side effects if any:  None    Risks , benefits, side effects and precautions of medications discussed with patient and reminded patient to let us know any problems with medications     Objective:     Vital Signs:  Vitals:    12/12/19 0730 12/12/19 0732 12/12/19 1627 12/12/19 2010   BP: 100/61 92/64 105/67 112/62   BP Location: Left arm Left arm Right arm Right arm   Pulse: 75 71 91 81   Resp: 18  14 18   Temp: 97 6 °F (36 4 °C)  (!) 97 2 °F (36 2 °C) (!) 97 2 °F (36 2 °C)   TempSrc: Temporal  Temporal Temporal   SpO2:   91% 90%   Weight:       Height:         Appearance:  age appropriate, casually dressed and overweight older than stated age, friendly not too pleasant  with hair uncombed but better groomed with no body odor today and found sitting in the dining arrieta and was polite   Behavior:  evasive and guarded and suspicious and preoccupied with psychosomatic complaints as usual   Speech:  normal pitch and normal volume but circumstantial and tangential with stilted speech    Mood:  anxious and dysthymic   Affect:  mood-congruent, elated and entitled anxious and irritated and sad at times but pleasant today    Thought Process:  goal directed and illogical slightly pressured and tangential and talks as if in a court of law   Thought Content:    Still believes that the medical resident is have boyfriend    Continues to have fixed set somatic believes as before but did not bring them up and no longer complains of spitting out blood     No current suicidal homicidal thoughts intent or plans reported  No phobias obsessions or compulsions reported  Still accusatory  to certain staff and fixated on multiple physical complaints as usual   Perceptual Disturbances: None and does not appear responding to internal stimuli at times   Risk Potential: Tendency to not care for herself    Sensorium:  person, place, time/date, situation, day of week, month of year and time   Cognition:  grossly intact   Consciousness:  alert and awake    Attention: Intact concentration and attention span   Intellect: Considered to be at least of average intelligence   Insight:  limited and in denial of her illness   Judgment: poor      Motor Activity: no abnormal movements         Recent Labs:  Results Reviewed     None          I/O Past 24 hours:  No intake/output data recorded  No intake/output data recorded  Assessment / Plan:     [facility]      Reason for continued inpatient care:  Because of underlying paranoia and refusal to go back to the personal care home and inability to care for herself on her own  Acceptance by patient:  Accepting  Colette Castrejon in recovery:  About living in another personal care home once she feels better  Understanding of medications :  Has some understanding  Involved in reintegration process:  Adjusting to the unit  Trusting in relatoinship with psychiatrist:  Trusting    Recommended Treatment:    Medication changes:  1) none today  Non-pharmacological treatments  1) continue with  individual therapy group therapy, milieu therapy and occupational therapy and milieu therapy involving multidisciplinary team approach with psychotherapist, case management, nursing, peer support services, art therapist etc using recovery principles and psycho-education about accepting illness and need for treatment     2) encourage to cocoperate with behavior plan  3) encourage to attend to ADLs like taking showers and wearing clean clothes   4) Encourage to attend groups   Safety  1) Safety/communication plan established targeting dynamic risk factors above  Discharge Plan most likely back to the a:  Personal care boarding home with act services once her delusions are managed and mood becomes more stabilized and she attends to her ADLs she becomes more receptive to treatment compliance but she has not shown any improvement in the last 5 months since she has been on the unit and hence the referral needs to be initiated for the  [address] [place] [address] / Coordination of Care    Total floor / unit time spent today 15 minutes  Greater than 50% of total time was spent with the patient and / or family counseling and / or coordination of care  A description of the counseling / coordination of care  Patient's Rights, confidentiality and exceptions to confidentiality, use of automated medical record, [facility] access to medical record, and consent to treatment reviewed      Shaggy Rangel MD

## 2019-12-13 NOTE — PLAN OF CARE
Problem: Alteration in Thoughts and Perception  Goal: Verbalize thoughts and feelings  Description  Interventions:  - Promote a nonjudgmental and trusting relationship with the patient through active listening and therapeutic communication  - Assess patient's level of functioning, behavior and potential for risk  - Engage patient in 1 on 1 interactions for a minimum of 15 minutes each session  - Encourage patient to express fears, feelings, frustrations, and discuss symptoms    - [place] patient to reality, help patient recognize reality-based thinking   - Administer medications as ordered and assess for potential side effects  - Provide the patient education related to the signs and symptoms of the illness and desired effects of prescribed medications  12/12/2019 2327 by Bronwyn Crigler, RN  Outcome: Progressing  12/12/2019 2326 by Bronwyn Crigler, RN  Outcome: Progressing  Goal: Agree to be compliant with medication regime, as prescribed and report medication side effects  Description  Interventions:  - Offer appropriate PRN medication and supervise ingestion; conduct aims, as needed   12/12/2019 2327 by Bronwyn Crigler, RN  Outcome: Progressing  12/12/2019 2326 by Bronwyn Crigler, RN  Outcome: Progressing  Goal: Attend and participate in unit activities, including therapeutic, recreational, and educational groups  Description  Interventions:  - Provide therapeutic and educational activities daily, encourage attendance and participation, and document same in the medical record     CERTIFIED PEER SPECIALIST INTERVENTIONS:    Complete peer assessment with patient to assess their needs and identify their goals to complete while in the recovery program as well as once discharged into the community  Patient will complete WRAP Plan, Crisis Plan and [employer]  Patient will attend 50% of groups offered on the unit  Patient will complete a goal card weekly      12/12/2019 2327 by Bronwyn Crigler, RN  Outcome: Progressing  12/12/2019 2326 by Ingrid Trotter RN  Outcome: Progressing     Problem: Depression  Goal: Refrain from isolation  Description  Interventions:  - Develop a trusting relationship   - Encourage socialization   12/12/2019 2327 by Ingrid Trotter RN  Outcome: Progressing  12/12/2019 2326 by Ingrid Trotter RN  Outcome: Progressing     Problem: RESPIRATORY - ADULT  Goal: Achieves optimal ventilation and oxygenation  Description  INTERVENTIONS:  - Assess for changes in respiratory status  - Assess for changes in mentation and behavior  - Position to facilitate oxygenation and minimize respiratory effort  - Oxygen administration by appropriate delivery method based on oxygen saturation (per order) or ABGs  - Initiate smoking cessation education as indicated  - Encourage broncho-pulmonary hygiene including cough, deep breathe, Incentive Spirometry  - Assess the need for suctioning and aspirate as needed  - Assess and instruct to report SOB or any respiratory difficulty  - Respiratory Therapy support as indicated  Outcome: Progressing     Problem: Nutrition/Hydration-ADULT  Goal: Nutrient/Hydration intake appropriate for improving, restoring or maintaining nutritional needs  Description  Monitor and assess patient's nutrition/hydration status for malnutrition  Collaborate with interdisciplinary team and initiate plan and interventions as ordered  Monitor patient's weight and dietary intake as ordered or per policy  Utilize nutrition screening tool and intervene as necessary  Determine patient's food preferences and provide high-protein, high-caloric foods as appropriate       INTERVENTIONS:  - Monitor oral intake, urinary output, labs, and treatment plans  - Assess nutrition and hydration status and recommend course of action  - Evaluate amount of meals eaten  - Assist patient with eating if necessary   - Allow adequate time for meals  - Recommend/ encourage appropriate diets, oral nutritional supplements, and vitamin/mineral supplements  - Order, calculate, and assess calorie counts as needed  - Recommend, monitor, and adjust tube feedings and TPN/PPN based on assessed needs  - Assess need for intravenous fluids  - Provide specific nutrition/hydration education as appropriate  - Include patient/family/caregiver in decisions related to nutrition  12/12/2019 2327 by Jesús Hillman RN  Outcome: Progressing  12/12/2019 2326 by Jesús Hillman RN  Outcome: Progressing     [address] has been OOB all shift either in dining room or phone chair in arrieta when it is not in use  She ate much better this evening; from her tray had all egg salad, juice, milk, an Ensure  Sister visited bringing a meal; she ate pork loin, mashed potato, carrots, 1/2 piece of pumpkin pie while interacting  Declined HS snack  Took part in PM Group  Used the [employer] achieving volumes of 1250ml, O2 sats over 90% this shift  She is pleasant, but, a bit distrustful of select staff who she views in negative terms (I e  Said the [facility] was "jaded" when unit rule was reinforced not to take food into bedroom)  Wearing now her QHS humidified nasal O2 @ 1L for bed

## 2019-12-13 NOTE — PLAN OF CARE
Problem: Alteration in Thoughts and Perception  Goal: Verbalize thoughts and feelings  Description  Interventions:  - Promote a nonjudgmental and trusting relationship with the patient through active listening and therapeutic communication  - Assess patient's level of functioning, behavior and potential for risk  - Engage patient in 1 on 1 interactions for a minimum of 15 minutes each session  - Encourage patient to express fears, feelings, frustrations, and discuss symptoms    - [place] patient to reality, help patient recognize reality-based thinking   - Administer medications as ordered and assess for potential side effects  - Provide the patient education related to the signs and symptoms of the illness and desired effects of prescribed medications  Outcome: Progressing  Goal: Complete daily ADLs, including personal hygiene independently, as able  Description  Interventions:  - Observe, teach, and assist patient with ADLS  - Monitor and promote a balance of rest/activity, with adequate nutrition and elimination   Outcome: Progressing     Problem: Ineffective Coping  Goal: Identifies ineffective coping skills  Outcome: Progressing     Problem: Risk for Self Injury/Neglect  Goal: Attend and participate in unit activities, including therapeutic, recreational, and educational groups  Description  Interventions:  - Provide therapeutic and educational activities daily, encourage attendance and participation, and document same in the medical record  - Obtain collateral information, encourage visitation and family involvement in care   Outcome: Progressing     Problem: Depression  Goal: Refrain from isolation  Description  Interventions:  - Develop a trusting relationship   - Encourage socialization   Outcome: Roger Cast has been intermittently visible, attending to select groups and somewhat social with peers and with staff  Less somatically preoccupied this shift, choosing to sleep in the her bedroom  Behaviors controlled and appropriate  Vital signs WNL

## 2019-12-14 NOTE — PLAN OF CARE
Problem: Alteration in Thoughts and Perception  Goal: Verbalize thoughts and feelings  Description  Interventions:  - Promote a nonjudgmental and trusting relationship with the patient through active listening and therapeutic communication  - Assess patient's level of functioning, behavior and potential for risk  - Engage patient in 1 on 1 interactions for a minimum of 15 minutes each session  - Encourage patient to express fears, feelings, frustrations, and discuss symptoms    - [place] patient to reality, help patient recognize reality-based thinking   - Administer medications as ordered and assess for potential side effects  - Provide the patient education related to the signs and symptoms of the illness and desired effects of prescribed medications  Outcome: Progressing  Goal: Agree to be compliant with medication regime, as prescribed and report medication side effects  Description  Interventions:  - Offer appropriate PRN medication and supervise ingestion; conduct aims, as needed   Outcome: Progressing  Goal: Attend and participate in unit activities, including therapeutic, recreational, and educational groups  Description  Interventions:  - Provide therapeutic and educational activities daily, encourage attendance and participation, and document same in the medical record     CERTIFIED PEER SPECIALIST INTERVENTIONS:    Complete peer assessment with patient to assess their needs and identify their goals to complete while in the recovery program as well as once discharged into the community  Patient will complete WRAP Plan, Crisis Plan and [facility] Life Domains  Patient will attend 50% of groups offered on the unit  Patient will complete a goal card weekly      Outcome: Progressing     Problem: Ineffective Coping  Goal: Participates in unit activities  Description  Interventions:  - Provide therapeutic environment   - Provide required programming   - Redirect inappropriate behaviors   Outcome: Progressing  Goal: Patient/Family verbalizes awareness of resources  Outcome: Progressing  Goal: Understands least restrictive measures  Description  Interventions:  - Utilize least restrictive behavior  Outcome: Progressing     Problem: Risk for Self Injury/Neglect  Goal: Treatment Goal: Remain safe during length of stay, learn and adopt new coping skills, and be free of self-injurious ideation, impulses and acts at the time of discharge  Outcome: Progressing  Goal: Verbalize thoughts and feelings  Description  Interventions:  - Assess and re-assess patient's lethality and potential for self-injury  - Engage patient in 1:1 interactions, daily, for a minimum of 15 minutes  - Encourage patient to express feelings, fears, frustrations, hopes  - Establish rapport/trust with patient   Outcome: Progressing  Goal: Refrain from harming self  Description  Interventions:  - Monitor patient closely, per order  - Develop a trusting relationship  - Supervise medication ingestion, monitor effects and side effects   Outcome: Progressing  Goal: Attend and participate in unit activities, including therapeutic, recreational, and educational groups  Description  Interventions:  - Provide therapeutic and educational activities daily, encourage attendance and participation, and document same in the medical record  - Obtain collateral information, encourage visitation and family involvement in care   Outcome: Progressing     Problem: Depression  Goal: Verbalize thoughts and feelings  Description  Interventions:  - Assess and re-assess patient's level of risk   - Engage patient in 1:1 interactions, daily, for a minimum of 15 minutes   - Encourage patient to express feelings, fears, frustrations, hopes   Outcome: Progressing     Problem: Anxiety  Goal: Anxiety is at manageable level  Description  Interventions:  - Assess and monitor patient's anxiety level     - Monitor for signs and symptoms of anxiety both physical and emotional (heart palpitations, chest pain, shortness of breath, headaches, nausea, feeling jumpy, restlessness, irritable, apprehensive)  - Collaborate with interdisciplinary team and initiate plan and interventions as ordered  - [place] patient to unit/surroundings  - Explain treatment plan  - Encourage participation in care  - Encourage verbalization of concerns/fears  - Identify coping mechanisms  - Assist in developing anxiety-reducing skills  - Administer/offer alternative therapies  - Limit or eliminate stimulants  Outcome: Progressing     Problem: Alteration in Orientation  Goal: Interact with staff daily  Description  Interventions:  - Assess and re-assess patient's level of orientation  - Engage patient in 1 on 1 interactions, daily, for a minimum of 15 minutes   - Establish rapport/trust with patient   Outcome: Progressing     Problem: PAIN - ADULT  Goal: Verbalizes/displays adequate comfort level or baseline comfort level  Description  Interventions:  - Encourage patient to monitor pain and request assistance  - Assess pain using appropriate pain scale  - Administer analgesics based on type and severity of pain and evaluate response  - Implement non-pharmacological measures as appropriate and evaluate response  - Consider cultural and social influences on pain and pain management  - Notify physician/advanced practitioner if interventions unsuccessful or patient reports new pain  Outcome: Progressing     Problem: SAFETY ADULT  Goal: Patient will remain free of falls  Description  INTERVENTIONS:  - Assess patient frequently for physical needs  -  Identify cognitive and physical deficits and behaviors that affect risk of falls    -  [place] fall precautions as indicated by assessment   - Educate patient/family on patient safety including physical limitations  - Instruct patient to call for assistance with activity based on assessment  - Modify environment to reduce risk of injury  - Consider OT/PT consult to assist with strengthening/mobility  Outcome: Progressing     Problem: Alteration in Thoughts and Perception  Goal: Treatment Goal: Gain control of psychotic behaviors/thinking, reduce/eliminate presenting symptoms and demonstrate improved reality functioning upon discharge  Outcome: Not Progressing  Goal: Recognize dysfunctional thoughts, communicate reality-based thoughts at the time of discharge  Description  Interventions:  - Provide medication and psycho-education to assist patient in compliance and developing insight into his/her illness   Outcome: Not Progressing  Goal: Complete daily ADLs, including personal hygiene independently, as able  Description  Interventions:  - Observe, teach, and assist patient with ADLS  - Monitor and promote a balance of rest/activity, with adequate nutrition and elimination   Outcome: Not Progressing     Problem: Risk for Self Injury/Neglect  Goal: Complete daily ADLs, including personal hygiene independently, as able  Description  Interventions:  - Observe, teach, and assist patient with ADLS  - Monitor and promote a balance of rest/activity, with adequate nutrition and elimination  Outcome: Not Progressing     Problem: Depression  Goal: Treatment Goal: Demonstrate behavioral control of depressive symptoms, verbalize feelings of improved mood/affect, and adopt new coping skills prior to discharge  Outcome: Not Progressing  Goal: Refrain from isolation  Description  Interventions:  - Develop a trusting relationship   - Encourage socialization   Outcome: Not Progressing  Goal: Refrain from self-neglect  Outcome: Not Progressing     Problem: Nutrition/Hydration-ADULT  Goal: Nutrient/Hydration intake appropriate for improving, restoring or maintaining nutritional needs  Description  Monitor and assess patient's nutrition/hydration status for malnutrition  Collaborate with interdisciplinary team and initiate plan and interventions as ordered    Monitor patient's weight and dietary intake as ordered or per policy  Utilize nutrition screening tool and intervene as necessary  Determine patient's food preferences and provide high-protein, high-caloric foods as appropriate  INTERVENTIONS:  - Monitor oral intake, urinary output, labs, and treatment plans  - Assess nutrition and hydration status and recommend course of action  - Evaluate amount of meals eaten  - Assist patient with eating if necessary   - Allow adequate time for meals  - Recommend/ encourage appropriate diets, oral nutritional supplements, and vitamin/mineral supplements  - Order, calculate, and assess calorie counts as needed  - Recommend, monitor, and adjust tube feedings and TPN/PPN based on assessed needs  - Assess need for intravenous fluids  - Provide specific nutrition/hydration education as appropriate  - Include patient/family/caregiver in decisions related to nutrition  Outcome: Not Progressing   BEHAVIORS: Visible intermittently, mostly isolative to her bed, non compliant with being out of bed or using incentive spirometer  Attended community meeting  Did not shower or do hygiene  No somatic complaints voiced  Dr Tawanda Alvarado saw her and discussed hiatal hernia surgery with her  He's giving her some time to think about having the surgery done and plans to follow up with her tomorrow  Appears to have no difficulty swallowing foods that she likes to eat and continues to be selective with what she will eat (usually sweet / sugary foods or snack foods): 50% breakfast - ate yogurt and oatmeal, didn't eat sausage, 25% lunch: ate cookies, didn't eat hamburger and fries  No other behaviors or issues noted  Med compliant  Continue to monitor

## 2019-12-14 NOTE — PLAN OF CARE
Problem: Alteration in Thoughts and Perception  Goal: Verbalize thoughts and feelings  Description  Interventions:  - Promote a nonjudgmental and trusting relationship with the patient through active listening and therapeutic communication  - Assess patient's level of functioning, behavior and potential for risk  - Engage patient in 1 on 1 interactions for a minimum of 15 minutes each session  - Encourage patient to express fears, feelings, frustrations, and discuss symptoms    - [place] patient to reality, help patient recognize reality-based thinking   - Administer medications as ordered and assess for potential side effects  - Provide the patient education related to the signs and symptoms of the illness and desired effects of prescribed medications  Outcome: Progressing  Goal: Agree to be compliant with medication regime, as prescribed and report medication side effects  Description  Interventions:  - Offer appropriate PRN medication and supervise ingestion; conduct aims, as needed   Outcome: Progressing  Goal: Complete daily ADLs, including personal hygiene independently, as able  Description  Interventions:  - Observe, teach, and assist patient with ADLS  - Monitor and promote a balance of rest/activity, with adequate nutrition and elimination   Outcome: Progressing     Problem: Anxiety  Goal: Anxiety is at manageable level  Description  Interventions:  - Assess and monitor patient's anxiety level  - Monitor for signs and symptoms of anxiety both physical and emotional (heart palpitations, chest pain, shortness of breath, headaches, nausea, feeling jumpy, restlessness, irritable, apprehensive)  - Collaborate with interdisciplinary team and initiate plan and interventions as ordered    - [place] patient to unit/surroundings  - Explain treatment plan  - Encourage participation in care  - Encourage verbalization of concerns/fears  - Identify coping mechanisms  - Assist in developing anxiety-reducing skills  - Administer/offer alternative therapies  - Limit or eliminate stimulants  Outcome: Progressing     Problem: Alteration in Orientation  Goal: Interact with staff daily  Description  Interventions:  - Assess and re-assess patient's level of orientation  - Engage patient in 1 on 1 interactions, daily, for a minimum of 15 minutes   - Establish rapport/trust with patient   Outcome: Progressing     Problem: SAFETY ADULT  Goal: Patient will remain free of falls  Description  INTERVENTIONS:  - Assess patient frequently for physical needs  -  Identify cognitive and physical deficits and behaviors that affect risk of falls  -  [place] fall precautions as indicated by assessment   - Educate patient/family on patient safety including physical limitations  - Instruct patient to call for assistance with activity based on assessment  - Modify environment to reduce risk of injury  - Consider OT/PT consult to assist with strengthening/mobility  Outcome: Donavon Scott was in her room in bed at the beginning of the shift  VSS  Denies pain  Stated that the surgeon was here to see her earlier today  "He is going to come back tomorrow to see me again " Sits in the chair by the phone at times  Occasionally makes a call  Likes to converse with staff while sitting there  Took pills without difficulty  Ate 75% of her meal and drank Ensure  Ear drops to left ear as ordered  Social with select peers when out of her room for supper  She did her incentive spirometer  She achieved 1100 ml the 10 times she did it  No complaint of shortness of breath or swallowing difficulty thus far this shift  Naps at intervals  Did not attend evening group  Did not have snack  Needed prompting X3 for HS medication due to being asleep  Oxygen level 89% on room air prior to oxygen 1 liter being placed via nasal cannula  Continue to monitor  Precautions maintained

## 2019-12-14 NOTE — CONSULTS
Chief Complaint:  Large hiatal hernia      History of Present Illness:  Patient is a 29-year-old white female with a history of schizoaffective disorder, chronic depression, substance abuse who is a resident of the [facility]  at the [facility]  She has been evaluated in the past for a history of emphysema and chronic respiratory failure  She is on chronic oxygen  She was recently evaluated and found to have a right upper and right middle lobe pneumonia  She was evaluated by Internal Medicine subsequent by Pulmonary  Pulmonary feels that her x-ray findings are consistent with chronic reflux and regurgitation  She has had plain films in the past demonstrated a large hiatal hernia  A CT scan and also upper GI verify this fact  Consultation was obtained with general surgery in regards to this  The patient admits to regurgitation  Occasional heartburn  Occasional cough  She does admit to early early satiety  Past Medical History:   Past Medical History:   Diagnosis Date    Acid reflux     Anxiety     Asthma     Bipolar 1 disorder (HCC)     Chronic pain disorder     Chronic respiratory failure (HCC)     Compression fracture of fourth lumbar vertebra (HCC)     COPD (chronic obstructive pulmonary disease) (HCC)     Depression     GERD (gastroesophageal reflux disease)     History of home oxygen therapy     Hypothyroidism     Lipoma of upper extremity     Psychiatric illness     Schizoaffective disorder (HCC)     Substance abuse (HCC)     Nicotine    Thoracic compression fracture (HCC)     Ventral hernia          Past Surgical History:  History reviewed  No pertinent surgical history  Allergies:     Allergies   Allergen Reactions    Peanut-Containing Drug Products Anaphylaxis    Shellfish-Derived Products Anaphylaxis    Antihistamines, Chlorpheniramine-Type     Aspirin     Atrovent [Ipratropium]     Elavil [Amitriptyline]     Iodine Other reaction(s): Unknown Reaction    Levaquin [Levofloxacin]     Novocain [Procaine]     Penicillins      Able to tolerate azithromycin and vancomycin    Prolixin [Fluphenazine]     Sulfa Antibiotics Other (See Comments)     Unknown Zithromax-Tight Throat    Zithromax [Azithromycin] Throat Swelling     Tight throat         Medications:    Current Facility-Administered Medications:     acetaminophen (TYLENOL) tablet 650 mg, 650 mg, Oral, Q6H PRN, Shilpa Trujillo MD    albuterol (PROVENTIL HFA,VENTOLIN HFA) inhaler 2 puff, 2 puff, Inhalation, Q4H PRN, Shilpa Trujillo MD, 2 puff at 10/11/19 0424    aluminum-magnesium hydroxide-simethicone (MYLANTA) 200-200-20 mg/5 mL oral suspension 15 mL, 15 mL, Oral, Q4H PRN, Shilpa Trujillo MD    ammonium lactate (LAC-HYDRIN) 12 % lotion 1 application, 1 application, Topical, BID PRN, Shilpa Trujillo MD    benzonatate (TESSALON PERLES) capsule 100 mg, 100 mg, Oral, TID PRN, Shilpa Trujillo MD    benztropine (COGENTIN) injection 1 mg, 1 mg, Intramuscular, Q8H PRN, Shilpa Trujillo MD    carbamide peroxide (DEBROX) 6 5 % otic solution 5 drop, 5 drop, Left Ear, BID, Rona Lee MD, 5 drop at 12/13/19 2129    cloZAPine (CLOZARIL) tablet 25 mg, 25 mg, Oral, BID, Shilpa Trujillo MD, 25 mg at 12/13/19 2129    cloZAPine (CLOZARIL) tablet 50 mg, 50 mg, Oral, BID, Shilpa Trujillo MD, 50 mg at 12/13/19 2129    EPINEPHrine PF (ADRENALIN) 1 mg/mL injection 0 15 mg, 0 15 mg, Intramuscular, Once PRN, Shilpa Trujillo MD    fluticasone-vilanterol (BREO ELLIPTA) 200-25 MCG/INH inhaler 1 puff, 1 puff, Inhalation, Daily, Shilpa Trujillo MD, 1 puff at 12/14/19 0854    ketotifen (ZADITOR) 0 025 % ophthalmic solution 1 drop, 1 drop, Right Eye, BID PRN, Shilpa Trujillo MD    levothyroxine tablet 125 mcg, 125 mcg, Oral, Early Morning, Shilpa Trujillo MD, 125 mcg at 12/14/19 0600    magnesium hydroxide (MILK OF MAGNESIA) 400 mg/5 mL oral suspension 30 mL, 30 mL, Oral, Daily PRN, Shilpa Trujillo MD    montelukast (SINGULAIR) tablet 10 mg, 10 mg, Oral, HS, Vincent Olivares MD, 10 mg at 11/12/19 2141    OLANZapine (ZyPREXA) IM injection 5 mg, 5 mg, Intramuscular, Q8H PRN, Vincent Olivares MD    OLANZapine (ZyPREXA) tablet 5 mg, 5 mg, Oral, Q8H PRN, Vincent Olivares MD    ondansetron (ZOFRAN-ODT) dispersible tablet 4 mg, 4 mg, Oral, Q6H PRN, Vincent Olivares MD, 4 mg at 12/09/19 1757    pantoprazole (PROTONIX) EC tablet 40 mg, 40 mg, Oral, Early Morning, Vincent Olivares MD, 40 mg at 12/14/19 0600    polyethylene glycol (MIRALAX) packet 17 g, 17 g, Oral, Daily PRN, Vincent Olivares MD    polyvinyl alcohol (LIQUIFILM TEARS) 1 4 % ophthalmic solution 1 drop, 1 drop, Both Eyes, Q3H PRN, Vincent Olivares MD    sertraline (ZOLOFT) tablet 50 mg, 50 mg, Oral, BID, Vincent Olivares MD, 50 mg at 12/14/19 0854    sucralfate (CARAFATE) oral suspension 1,000 mg, 1,000 mg, Oral, BID, Cat Torres MD, 1,000 mg at 12/14/19 0600    theophylline (JEF-24) 24 hr capsule 200 mg, 200 mg, Oral, Daily, Vincent Olivares MD, 200 mg at 12/14/19 0854    tiotropium (SPIRIVA) capsule for inhaler 18 mcg, 18 mcg, Inhalation, Daily, Vincent Olivares MD, 18 mcg at 12/14/19 0854    traZODone (DESYREL) tablet 25 mg, 25 mg, Oral, HS PRN, Vincent Olivares MD      Social History:  Social History     Social History     Substance and Sexual Activity   Alcohol Use Never    Frequency: Never    Binge frequency: Never     Social History     Substance and Sexual Activity   Drug Use No     Social History     Tobacco Use   Smoking Status Current Every Day Smoker    Packs/day: 0 20    Types: Cigarettes   Smokeless Tobacco Never Used         Family History:    Family History   Problem Relation Age of Onset    Arthritis Mother          Review of Systems:    Please refer to the formal H&P  Vitals:  Vitals:    12/14/19 0732   BP: 118/67   Pulse: 86   Resp:    Temp:    SpO2:        Physical Exam:  Middle-aged white female in bed who is awake alert and conversant    Vital signs as above  Skin warm dry  Head normocephalic and atraumatic  Eyes JOVANNI a m  Intact  Ears nose within normal limits  Throat gag reflex intact  Neck no masses or thyromegaly  Back no CVA or spinal tenderness  Lungs decreased breath sounds with occasional rhonchi  Cor regular rate and rhythm  Abdomen soft flat scars consistent with previous laparoscopic procedure (lap choly) no abdominal mass or hernias noted  Extremities arthritic changes are noted  Neurologically she appears A&O x3 cranial nerves 2-12 intact  Lymphadenopathy:  None noted    Lab Results: I have personally reviewed pertinent reports  See below  Imaging: I have personally reviewed pertinent imaging studies including CT scan, chest x-ray, upper GI   EKG, Pathology, and Other Studies: I have personally reviewed pertinent reports  No results displayed because visit has over 200 results  Impression:  Large hiatal hernia with reflux/regurgitation contributing to pulmonary issues  Plan:  Discussed the findings with the patient  She understands  She has to think about surgery  This is our only option  We discussed laparoscopic repair of hiatal hernia briefly  We will follow up with you  Thank you for this interesting consult

## 2019-12-14 NOTE — PROGRESS NOTES
Sleeping at this time, no s/s of distress  Safe and fall precautions in place and maintained  O2 1L NC in place   Monitoring continues

## 2019-12-14 NOTE — PROGRESS NOTES
Psychiatry Progress Note    Subjective: Interval History     Patient visible on the unit this morning  Patient reports that she is doing okay  Having some complaints about staff this morning as she is lacking insight into the fact that she needs to follow the protocol and unit rules as far as breakfast/trays   Patient minimizing any psychiatric symptoms as she continues to deny any depression or anxiety  Denying any suicidal ideations or homicidal ideations   Denying any auditory visual hallucinations  Still remains suspicious of select staff members  Has been attending select groups  Has been medication and meal compliant  Still somatic at times        Behavior over the last 24 hours:  unchanged  Sleep: normal  Appetite: normal  Medication side effects: No  ROS: no complaints    Current medications:    Current Facility-Administered Medications:     acetaminophen (TYLENOL) tablet 650 mg, 650 mg, Oral, Q6H PRN, Deedee Muir MD    albuterol (PROVENTIL HFA,VENTOLIN HFA) inhaler 2 puff, 2 puff, Inhalation, Q4H PRN, Deedee Muir MD, 2 puff at 10/11/19 0424    aluminum-magnesium hydroxide-simethicone (MYLANTA) 200-200-20 mg/5 mL oral suspension 15 mL, 15 mL, Oral, Q4H PRN, Deedee Muir MD    ammonium lactate (LAC-HYDRIN) 12 % lotion 1 application, 1 application, Topical, BID PRN, Deedee Muir MD    benzonatate (TESSALON PERLES) capsule 100 mg, 100 mg, Oral, TID PRN, Deedee Muir MD    benztropine (COGENTIN) injection 1 mg, 1 mg, Intramuscular, Q8H PRN, Deedee Muir MD    carbamide peroxide (DEBROX) 6 5 % otic solution 5 drop, 5 drop, Left Ear, BID, Rona Clinton MD, 5 drop at 12/13/19 2129    cloZAPine (CLOZARIL) tablet 25 mg, 25 mg, Oral, BID, Deedee Muir MD, 25 mg at 12/13/19 2129    cloZAPine (CLOZARIL) tablet 50 mg, 50 mg, Oral, BID, Deedee Muir MD, 50 mg at 12/13/19 2129    EPINEPHrine PF (ADRENALIN) 1 mg/mL injection 0 15 mg, 0 15 mg, Intramuscular, Once PRN, MD Evelina De Leon fluticasone-vilanterol (BREO ELLIPTA) 200-25 MCG/INH inhaler 1 puff, 1 puff, Inhalation, Daily, Sandhya Bolaños MD, 1 puff at 12/14/19 0854    ketotifen (ZADITOR) 0 025 % ophthalmic solution 1 drop, 1 drop, Right Eye, BID PRN, Sandhya Bolaños MD    levothyroxine tablet 125 mcg, 125 mcg, Oral, Early Morning, Sandhya Bolaños MD, 125 mcg at 12/14/19 0600    magnesium hydroxide (MILK OF MAGNESIA) 400 mg/5 mL oral suspension 30 mL, 30 mL, Oral, Daily PRN, Sandhya Bolaños MD    montelukast (SINGULAIR) tablet 10 mg, 10 mg, Oral, HS, Sandhya Bolaños MD, 10 mg at 11/12/19 2141    OLANZapine (ZyPREXA) IM injection 5 mg, 5 mg, Intramuscular, Q8H PRN, Sandhya Bolaños MD    OLANZapine (ZyPREXA) tablet 5 mg, 5 mg, Oral, Q8H PRN, Sandhya Bolaños MD    ondansetron (ZOFRAN-ODT) dispersible tablet 4 mg, 4 mg, Oral, Q6H PRN, Sandhya Bolaños MD, 4 mg at 12/09/19 1757    pantoprazole (PROTONIX) EC tablet 40 mg, 40 mg, Oral, Early Morning, Sandhya Bolaños MD, 40 mg at 12/14/19 0600    polyethylene glycol (MIRALAX) packet 17 g, 17 g, Oral, Daily PRN, Sandhya Bolaños MD    polyvinyl alcohol (LIQUIFILM TEARS) 1 4 % ophthalmic solution 1 drop, 1 drop, Both Eyes, Q3H PRN, Sandhya Bolaños MD    sertraline (ZOLOFT) tablet 50 mg, 50 mg, Oral, BID, Sandhya Bolaños MD, 50 mg at 12/14/19 0854    sucralfate (CARAFATE) oral suspension 1,000 mg, 1,000 mg, Oral, BID, Cat Torres MD, 1,000 mg at 12/14/19 0600    theophylline (JEF-24) 24 hr capsule 200 mg, 200 mg, Oral, Daily, Sandhya Bolaños MD, 200 mg at 12/14/19 0854    tiotropium (SPIRIVA) capsule for inhaler 18 mcg, 18 mcg, Inhalation, Daily, Sandhya Bolaños MD, 18 mcg at 12/14/19 0854    traZODone (DESYREL) tablet 25 mg, 25 mg, Oral, HS PRN, Sandhya Bolaños MD    Current Problem List:    Patient Active Problem List   Diagnosis    Sepsis ([facility] )    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[facility] )    Tobacco use disorder, continuous    Bipolar disorder ([facility] )    Left hip pain    Lactic acidosis    Hypokalemia    Hypomagnesemia    Compression fracture of L4 lumbar vertebra    Thoracic compression fracture (HCC)    Ventral hernia    Parapneumonic effusion    Acute on chronic respiratory failure with hypoxia (HCC)    Chronic respiratory failure (HCC)    Hypophosphatemia    Elevated MCV    Schizoaffective disorder, bipolar type (HCC)    Acquired hypothyroidism    Gastroesophageal reflux disease without esophagitis    Abnormal CT of the chest    Excessive cerumen in left ear canal    Lipoma of right upper extremity    Polydipsia    Localized swelling of both lower legs    Noncompliant with deep vein thrombosis (DVT) prophylaxis    Allergic conjunctivitis of right eye    At risk for aspiration    Ear ache       Problem list reviewed 12/14/19     Objective:     Vital Signs:  Vitals:    12/13/19 1646 12/13/19 1939 12/14/19 0730 12/14/19 0732   BP: 94/59 104/70 98/60 118/67   BP Location: Left arm Right arm Left arm Left arm   Pulse: 91 99 72 86   Resp: 16 16 17    Temp: 98 5 °F (36 9 °C) 98 5 °F (36 9 °C) 98 1 °F (36 7 °C)    TempSrc: Temporal Temporal Temporal    SpO2: 91% 92%     Weight:       Height:             Appearance:  age appropriate, casually dressed and disheveled   Behavior:  normal   Speech:  normal volume   Mood:  anxious   Affect:  constricted   Thought Process:  normal   Thought Content:  delusions  persecutory   Perceptual Disturbances: None   Risk Potential: none   Sensorium:  person, place and time   Cognition:  intact   Consciousness:  alert and awake    Attention: attention span and concentration were age appropriate   Intellect: average   Insight:  poor   Judgment: poor      Motor Activity: no abnormal movements       I/O Past 24 hours:  No intake/output data recorded  No intake/output data recorded          Labs:  Reviewed 12/14/19    Progress Toward Goals:  unchanged    Assessment / Plan:     [facility]ffective disorder, [facility] ([facility] )    Recommended Treatment:      Medication changes:  1) continue current treatment plan    Non-pharmacological treatments  1) Continue with group therapy, milieu therapy and occupational therapy  Safety  1) Safety/communication plan established targeting dynamic risk factors above  2) Risks, benefits, and possible side effects of medications explained to patient and patient verbalizes understanding  Counseling / Coordination of Care    Total floor / unit time spent today 20 minutes  Greater than 50% of total time was spent with the patient and / or family counseling and / or coordination of care  A description of the counseling / coordination of care  Patient's Rights, confidentiality and exceptions to confidentiality, use of automated medical record, [facility] staff access to medical record, and consent to treatment reviewed      Irwin Yanez PA-C

## 2019-12-14 NOTE — PLAN OF CARE
Problem: Alteration in Thoughts and Perception  Goal: Agree to be compliant with medication regime, as prescribed and report medication side effects  Description  Interventions:  - Offer appropriate PRN medication and supervise ingestion; conduct aims, as needed   12/13/2019 2151 by Khloe Gonzales RN  Outcome: Progressing  12/13/2019 2151 by Khloe Gonzales RN  Outcome: Progressing  Goal: Attend and participate in unit activities, including therapeutic, recreational, and educational groups  Description  Interventions:  - Provide therapeutic and educational activities daily, encourage attendance and participation, and document same in the medical record     CERTIFIED PEER SPECIALIST INTERVENTIONS:    Complete peer assessment with patient to assess their needs and identify their goals to complete while in the recovery program as well as once discharged into the community  Patient will complete WRAP Plan, Crisis Plan and 5 Life Domains  Patient will attend 50% of groups offered on the unit  Patient will complete a goal card weekly      12/13/2019 2151 by Khloe Gonzales RN  Outcome: Progressing  12/13/2019 2151 by Khloe Gonzales RN  Outcome: Progressing     Problem: Depression  Goal: Refrain from isolation  Description  Interventions:  - Develop a trusting relationship   - Encourage socialization   12/13/2019 2151 by Khloe Gonzales RN  Outcome: Progressing  12/13/2019 2151 by Khloe Gonzales RN  Outcome: Progressing     Problem: RESPIRATORY - ADULT  Goal: Achieves optimal ventilation and oxygenation  Description  INTERVENTIONS:  - Assess for changes in respiratory status  - Assess for changes in mentation and behavior  - Position to facilitate oxygenation and minimize respiratory effort  - Oxygen administration by appropriate delivery method based on oxygen saturation (per order) or ABGs  - Initiate smoking cessation education as indicated  - Encourage broncho-pulmonary hygiene including cough, deep breathe, Incentive Spirometry  - Assess the need for suctioning and aspirate as needed  - Assess and instruct to report SOB or any respiratory difficulty  - Respiratory Therapy support as indicated  Outcome: Progressing     2145 Chuy Hahn has been visible in milieu this shift, sitting out in dining room or @ phone chair in [place] when it is not in use  She is pleasant, chatty w/staff; some social interactions noted w/female peers @ table  For meal ate all egg salad, sherbet, Ensure  For HS snack had 1 yogurt & potato chips  Was medicine compliant w/all but the Singulair  Used the [employer], but, achieved poorer volumes than usual; mostly 1000ml  Attended the [employer] Social  Wearing now her QHS humidified nasal O2 @ 1L for bed

## 2019-12-15 NOTE — PROGRESS NOTES
Psychiatry Progress Note    Subjective: Interval History     Patient visible on the unit this morning  Continues to spend most time sitting in chair by phone  Reports surgeon came yesterday to discuss surgery for her hiatal hernia; reports that she  Is still thinking about it; consult indicates that surgeon will follow up with pt about her decision  Reports some anxiety due to somatic complaints but denies depression, SI and HI  No overt psychosis  Pt has been medication and meal compliant  Slept  Did not attend to hygiene yesterday; continues to be encouraged to attend to ADLs      Behavior over the last 24 hours:  unchanged  Sleep: normal  Appetite: normal  Medication side effects: No  ROS: no complaints    Current medications:    Current Facility-Administered Medications:     acetaminophen (TYLENOL) tablet 650 mg, 650 mg, Oral, Q6H PRN, Dalton Garner MD    albuterol (PROVENTIL HFA,VENTOLIN HFA) inhaler 2 puff, 2 puff, Inhalation, Q4H PRN, Dalton Garner MD, 2 puff at 10/11/19 0424    aluminum-magnesium hydroxide-simethicone (MYLANTA) 200-200-20 mg/5 mL oral suspension 15 mL, 15 mL, Oral, Q4H PRN, Dalton Garner MD    ammonium lactate (LAC-HYDRIN) 12 % lotion 1 application, 1 application, Topical, BID PRN, Dalton Garner MD    benzonatate (TESSALON PERLES) capsule 100 mg, 100 mg, Oral, TID PRN, Dalton Garner MD    benztropine (COGENTIN) injection 1 mg, 1 mg, Intramuscular, Q8H PRN, Dalton Garner MD    carbamide peroxide (DEBROX) 6 5 % otic solution 5 drop, 5 drop, Left Ear, BID, Rona Correia MD, 5 drop at 12/14/19 1708    cloZAPine (CLOZARIL) tablet 25 mg, 25 mg, Oral, BID, Dalton Garner MD, 25 mg at 12/14/19 2120    cloZAPine (CLOZARIL) tablet 50 mg, 50 mg, Oral, BID, Dalton Garner MD, 50 mg at 12/14/19 2120    EPINEPHrine PF (ADRENALIN) 1 mg/mL injection 0 15 mg, 0 15 mg, Intramuscular, Once PRN, Dalton Garner MD    fluticasone-vilanterol (BREO ELLIPTA) 200-25 MCG/INH inhaler 1 puff, 1 puff, Inhalation, Daily, Isidoro Gonzalez MD, 1 puff at 12/14/19 0854    ketotifen (ZADITOR) 0 025 % ophthalmic solution 1 drop, 1 drop, Right Eye, BID PRN, Isidoro Gonzalez MD    levothyroxine tablet 125 mcg, 125 mcg, Oral, Early Morning, Isidoro Gonzalez MD, 125 mcg at 12/15/19 0605    magnesium hydroxide (MILK OF MAGNESIA) 400 mg/5 mL oral suspension 30 mL, 30 mL, Oral, Daily PRN, Isidoro Gonzalez MD    montelukast (SINGULAIR) tablet 10 mg, 10 mg, Oral, HS, Isidoro Gonzalez MD, 10 mg at 11/12/19 2141    OLANZapine (ZyPREXA) IM injection 5 mg, 5 mg, Intramuscular, Q8H PRN, Isidoro Gonzalez MD    OLANZapine (ZyPREXA) tablet 5 mg, 5 mg, Oral, Q8H PRN, Isidoro Gonzalez MD    ondansetron (ZOFRAN-ODT) dispersible tablet 4 mg, 4 mg, Oral, Q6H PRN, Isidoro Gonzalez MD, 4 mg at 12/09/19 1757    pantoprazole (PROTONIX) EC tablet 40 mg, 40 mg, Oral, Early Morning, Isidoro Gonzalez MD, 40 mg at 12/15/19 0605    polyethylene glycol (MIRALAX) packet 17 g, 17 g, Oral, Daily PRN, Isidoro Gonzalez MD    polyvinyl alcohol (LIQUIFILM TEARS) 1 4 % ophthalmic solution 1 drop, 1 drop, Both Eyes, Q3H PRN, Isidoro Gonzalez MD    sertraline (ZOLOFT) tablet 50 mg, 50 mg, Oral, BID, Isidoro Gonzalez MD, 50 mg at 12/14/19 2119    sucralfate (CARAFATE) oral suspension 1,000 mg, 1,000 mg, Oral, BID, Cat Torres MD, 1,000 mg at 12/15/19 0605    theophylline (JEF-24) 24 hr capsule 200 mg, 200 mg, Oral, Daily, Isidoro Gonzalez MD, 200 mg at 12/14/19 0854    tiotropium (SPIRIVA) capsule for inhaler 18 mcg, 18 mcg, Inhalation, Daily, Isidoro Gonzalez MD, 18 mcg at 12/14/19 0854    traZODone (DESYREL) tablet 25 mg, 25 mg, Oral, HS PRN, Isidoro Gonzalez MD    Current Problem List:    Patient Active Problem List   Diagnosis    [facility]    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    Tobacco use disorder, continuous    [facility]    Left hip pain    Lactic acidosis    Hypokalemia    Hypomagnesemia    Compression fracture of L4 lumbar vertebra    Thoracic compression fracture (HCC)    Ventral hernia  Parapneumonic effusion    Acute on chronic respiratory failure with hypoxia (HCC)    Chronic respiratory failure (HCC)    Hypophosphatemia    Elevated MCV    Schizoaffective disorder, bipolar type (HCC)    Acquired hypothyroidism    Gastroesophageal reflux disease without esophagitis    Abnormal CT of the chest    Excessive cerumen in left ear canal    Lipoma of right upper extremity    Polydipsia    Localized swelling of both lower legs    Noncompliant with deep vein thrombosis (DVT) prophylaxis    Allergic conjunctivitis of right eye    At risk for aspiration    Ear ache       Problem list reviewed 12/15/19     Objective:     Vital Signs:  Vitals:    12/14/19 1600 12/14/19 1900 12/14/19 2130 12/15/19 0700   BP: 97/65 96/53  121/74   BP Location: Right arm Left arm  Right arm   Pulse: 76 90 82 92   Resp: 16 16  18   Temp: 98 1 °F (36 7 °C) 97 7 °F (36 5 °C)  97 5 °F (36 4 °C)   TempSrc: Temporal Temporal     SpO2: 91% 90% (!) 89%    Weight:    65 8 kg (145 lb)   Height:             Appearance:  age appropriate, casually dressed and disheveled   Behavior:  normal   Speech:  normal volume   Mood:  anxious   Affect:  constricted   Thought Process:  normal   Thought Content:  delusions  persecutory   Perceptual Disturbances: None   Risk Potential: none   Sensorium:  person, place and time   Cognition:  intact   Consciousness:  alert and awake    Attention: attention span and concentration were age appropriate   Intellect: average   Insight:  poor   Judgment: poor      Motor Activity: no abnormal movements       I/O Past 24 hours:  No intake/output data recorded  No intake/output data recorded          Labs:  Reviewed 12/15/19    Progress Toward Goals:  unchanged    Assessment / Plan:     Schizoaffective disorder, bipolar type ([email or web address] )    Recommended Treatment:      Medication changes:  1) continue current treatment plan    Non-pharmacological treatments  1) Continue with group therapy, milieu therapy and occupational therapy  Safety  1) Safety/communication plan established targeting dynamic risk factors above  2) Risks, benefits, and possible side effects of medications explained to patient and patient verbalizes understanding  Counseling / Coordination of Care    Total floor / unit time spent today 20 minutes  Greater than 50% of total time was spent with the patient and / or family counseling and / or coordination of care  A description of the counseling / coordination of care  Patient's Rights, confidentiality and exceptions to confidentiality, use of automated medical record, [facility] staff access to medical record, and consent to treatment reviewed      Krystian Vasquez PA-C

## 2019-12-15 NOTE — PROGRESS NOTES
Patient seen in [facility]  Sleeping in bed  Denies dysphagia  No vomiting  Encouraged to get out of bed  At least in a chair  Will see in a kilo Marks

## 2019-12-15 NOTE — PLAN OF CARE
Problem: Alteration in Thoughts and Perception  Goal: Verbalize thoughts and feelings  Description  Interventions:  - Promote a nonjudgmental and trusting relationship with the patient through active listening and therapeutic communication  - Assess patient's level of functioning, behavior and potential for risk  - Engage patient in 1 on 1 interactions for a minimum of 15 minutes each session  - Encourage patient to express fears, feelings, frustrations, and discuss symptoms    - [place] patient to reality, help patient recognize reality-based thinking   - Administer medications as ordered and assess for potential side effects  - Provide the patient education related to the signs and symptoms of the illness and desired effects of prescribed medications  Outcome: Progressing  Goal: Agree to be compliant with medication regime, as prescribed and report medication side effects  Description  Interventions:  - Offer appropriate PRN medication and supervise ingestion; conduct aims, as needed   Outcome: Progressing  Goal: Attend and participate in unit activities, including therapeutic, recreational, and educational groups  Description  Interventions:  - Provide therapeutic and educational activities daily, encourage attendance and participation, and document same in the medical record     CERTIFIED PEER SPECIALIST INTERVENTIONS:    Complete peer assessment with patient to assess their needs and identify their goals to complete while in the recovery program as well as once discharged into the community  Patient will complete WRAP Plan, Crisis Plan and 5 Life Domains  Patient will attend 50% of groups offered on the unit  Patient will complete a goal card weekly      Outcome: Progressing     Problem: Ineffective Coping  Goal: Understands least restrictive measures  Description  Interventions:  - Utilize least restrictive behavior  Outcome: Progressing     Problem: Risk for Self Injury/Neglect  Goal: Treatment Goal: Remain safe during length of stay, learn and adopt new coping skills, and be free of self-injurious ideation, impulses and acts at the time of discharge  Outcome: Progressing  Goal: Verbalize thoughts and feelings  Description  Interventions:  - Assess and re-assess patient's lethality and potential for self-injury  - Engage patient in 1:1 interactions, daily, for a minimum of 15 minutes  - Encourage patient to express feelings, fears, frustrations, hopes  - Establish rapport/trust with patient   Outcome: Progressing  Goal: Refrain from harming self  Description  Interventions:  - Monitor patient closely, per order  - Develop a trusting relationship  - Supervise medication ingestion, monitor effects and side effects   Outcome: Progressing  Goal: Attend and participate in unit activities, including therapeutic, recreational, and educational groups  Description  Interventions:  - Provide therapeutic and educational activities daily, encourage attendance and participation, and document same in the medical record  - Obtain collateral information, encourage visitation and family involvement in care   Outcome: Progressing     Problem: Depression  Goal: Verbalize thoughts and feelings  Description  Interventions:  - Assess and re-assess patient's level of risk   - Engage patient in 1:1 interactions, daily, for a minimum of 15 minutes   - Encourage patient to express feelings, fears, frustrations, hopes   Outcome: Progressing     Problem: Anxiety  Goal: Anxiety is at manageable level  Description  Interventions:  - Assess and monitor patient's anxiety level  - Monitor for signs and symptoms of anxiety both physical and emotional (heart palpitations, chest pain, shortness of breath, headaches, nausea, feeling jumpy, restlessness, irritable, apprehensive)  - Collaborate with interdisciplinary team and initiate plan and interventions as ordered    - [place] patient to unit/surroundings  - Explain treatment plan  - Encourage participation in care  - Encourage verbalization of concerns/fears  - Identify coping mechanisms  - Assist in developing anxiety-reducing skills  - Administer/offer alternative therapies  - Limit or eliminate stimulants  Outcome: Progressing     Problem: Alteration in Orientation  Goal: Interact with staff daily  Description  Interventions:  - Assess and re-assess patient's level of orientation  - Engage patient in 1 on 1 interactions, daily, for a minimum of 15 minutes   - Establish rapport/trust with patient   Outcome: Progressing     Problem: PAIN - ADULT  Goal: Verbalizes/displays adequate comfort level or baseline comfort level  Description  Interventions:  - Encourage patient to monitor pain and request assistance  - Assess pain using appropriate pain scale  - Administer analgesics based on type and severity of pain and evaluate response  - Implement non-pharmacological measures as appropriate and evaluate response  - Consider cultural and social influences on pain and pain management  - Notify physician/advanced practitioner if interventions unsuccessful or patient reports new pain  Outcome: Progressing     Problem: SAFETY ADULT  Goal: Patient will remain free of falls  Description  INTERVENTIONS:  - Assess patient frequently for physical needs  -  Identify cognitive and physical deficits and behaviors that affect risk of falls    -  Freedom fall precautions as indicated by assessment   - Educate patient/family on patient safety including physical limitations  - Instruct patient to call for assistance with activity based on assessment  - Modify environment to reduce risk of injury  - Consider OT/PT consult to assist with strengthening/mobility  Outcome: Progressing     Problem: Alteration in Thoughts and Perception  Goal: Treatment Goal: Gain control of psychotic behaviors/thinking, reduce/eliminate presenting symptoms and demonstrate improved reality functioning upon discharge  Outcome: Not Progressing  Goal: Recognize dysfunctional thoughts, communicate reality-based thoughts at the time of discharge  Description  Interventions:  - Provide medication and psycho-education to assist patient in compliance and developing insight into his/her illness   Outcome: Not Progressing  Goal: Complete daily ADLs, including personal hygiene independently, as able  Description  Interventions:  - Observe, teach, and assist patient with ADLS  - Monitor and promote a balance of rest/activity, with adequate nutrition and elimination   Outcome: Not Progressing     Problem: Risk for Self Injury/Neglect  Goal: Recognize maladaptive responses and adopt new coping mechanisms  Outcome: Not Progressing  Goal: Complete daily ADLs, including personal hygiene independently, as able  Description  Interventions:  - Observe, teach, and assist patient with ADLS  - Monitor and promote a balance of rest/activity, with adequate nutrition and elimination  Outcome: Not Progressing     Problem: Depression  Goal: Treatment Goal: Demonstrate behavioral control of depressive symptoms, verbalize feelings of improved mood/affect, and adopt new coping skills prior to discharge  Outcome: Not Progressing  Goal: Refrain from isolation  Description  Interventions:  - Develop a trusting relationship   - Encourage socialization   Outcome: Not Progressing  Goal: Refrain from self-neglect  Outcome: Not Progressing     Problem: Nutrition/Hydration-ADULT  Goal: Nutrient/Hydration intake appropriate for improving, restoring or maintaining nutritional needs  Description  Monitor and assess patient's nutrition/hydration status for malnutrition  Collaborate with interdisciplinary team and initiate plan and interventions as ordered  Monitor patient's weight and dietary intake as ordered or per policy  Utilize nutrition screening tool and intervene as necessary  Determine patient's food preferences and provide high-protein, high-caloric foods as appropriate  INTERVENTIONS:  - Monitor oral intake, urinary output, labs, and treatment plans  - Assess nutrition and hydration status and recommend course of action  - Evaluate amount of meals eaten  - Assist patient with eating if necessary   - Allow adequate time for meals  - Recommend/ encourage appropriate diets, oral nutritional supplements, and vitamin/mineral supplements  - Order, calculate, and assess calorie counts as needed  - Recommend, monitor, and adjust tube feedings and TPN/PPN based on assessed needs  - Assess need for intravenous fluids  - Provide specific nutrition/hydration education as appropriate  - Include patient/family/caregiver in decisions related to nutrition  Outcome: Not Progressing   Visible intermittently, mostly isolative to her bed, non compliant with being out of bed or using incentive spirometer  Attended community meeting  Did not shower or do hygiene  No somatic complaints voiced  Dr Donnie Connelly revisited her and discussed hiatal hernia surgery with her again  Continues to be selective with what she will eat (usually sweet / sugary foods or snack foods): 25% breakfast - 2 bites of french toast and drank fluids, 25% lunch: ate spaghetti, didn't meatballs or broccoli  No other behaviors or issues noted  Med compliant  Continue to monitor

## 2019-12-15 NOTE — PROGRESS NOTES
Sleeping at this time, no s/s of distress  O2 on at 1L via NC   All precautions in place and maintained at this time, monitoring continues

## 2019-12-16 NOTE — PROGRESS NOTES
Pt received @ 1500  Pleasant, cooperative, med/meal compliant  Ate 75% of dinner  Visible on unit, mostly isolative to self, social @ times  Showered this evening, attended movie group   No behavioral issues, will continue to monitor

## 2019-12-16 NOTE — PROGRESS NOTES
Progress Note - Nam Camacho 1955, 60 y o  female MRN: [id number]    [facility]-02 Encounter: [id number]    Primary Care Provider: Gabo Stubbs PA-C   Date and time admitted to hospital: 7/23/2019  5:30 PM        * Schizoaffective disorder, bipolar type [facility])  Assessment & Plan  Psychiatry Progress Note  Patient reports that she has not taken any showers since last Wednesday but plans to get 1 today evening  She also claims that she is trying to attend groups at least 2 times a day and still wants to go back to the rest of the home  She was seen by surgical consult father had a hernia but she is reluctant to have the surgery done now and wants to wait it out until she gets out  Compliant with medications so far including the clozapine  No signs or sxs of agranulocytosis or myocarditis or endocarditis and she is moving her bowels so far with no issues   However she still has passive multiple psychosomatic complaints as before like dying from some terrible illness, having low blood sugar, inability to swallow or breathe etc  which have been changed much    She has been sleeping well and has been still using the oxygen concentrator at night and reports that she is happy to hear that she can go back to the  personal care home she came from if she keeps up with her improvement rather than be transferred to the [facility] hospital   She no longer voices any delusions that there may be a micro chip on 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   Current medications:    Current Facility-Administered Medications:     acetaminophen (TYLENOL) tablet 650 mg, 650 mg, Oral, Q6H PRN, Ivonne Nevarez MD    albuterol (PROVENTIL HFA,VENTOLIN HFA) inhaler 2 puff, 2 puff, Inhalation, Q4H PRN, Ivonne Nevarez MD, 2 puff at 10/11/19 0424    aluminum-magnesium hydroxide-simethicone (MYLANTA) 200-200-20 mg/5 mL oral suspension 15 mL, 15 mL, Oral, Q4H PRN, Ivonne Nevarez MD    ammonium lactate (LAC-HYDRIN) 12 % lotion 1 application, 1 application, Topical, BID PRN, Faheem Daniels MD    benzonatate (TESSALON PERLES) capsule 100 mg, 100 mg, Oral, TID PRN, Faheem Daniels MD    benztropine (COGENTIN) injection 1 mg, 1 mg, Intramuscular, Q8H PRN, Faheem Daniels MD    carbamide peroxide (DEBROX) 6 5 % otic solution 5 drop, 5 drop, Left Ear, BID, Rona Vázquez MD, 5 drop at 12/15/19 2137    cloZAPine (CLOZARIL) tablet 25 mg, 25 mg, Oral, BID, Faheem Daniels MD, 25 mg at 12/15/19 2044    cloZAPine (CLOZARIL) tablet 50 mg, 50 mg, Oral, BID, Faheem Daniels MD, 50 mg at 12/15/19 2044    EPINEPHrine PF (ADRENALIN) 1 mg/mL injection 0 15 mg, 0 15 mg, Intramuscular, Once PRN, Faheem Daniels MD    fluticasone-vilanterol (BREO ELLIPTA) 200-25 MCG/INH inhaler 1 puff, 1 puff, Inhalation, Daily, Faheem Daniels MD, 1 puff at 12/16/19 8677    ketotifen (ZADITOR) 0 025 % ophthalmic solution 1 drop, 1 drop, Right Eye, BID PRN, Faheem Daniels MD    levothyroxine tablet 125 mcg, 125 mcg, Oral, Early Morning, Faheem Daniels MD, 125 mcg at 12/16/19 0606    magnesium hydroxide (MILK OF MAGNESIA) 400 mg/5 mL oral suspension 30 mL, 30 mL, Oral, Daily PRN, Faheem Daniels MD    montelukast (SINGULAIR) tablet 10 mg, 10 mg, Oral, HS, Faheem Daniels MD, 10 mg at 12/15/19 2107    OLANZapine (ZyPREXA) IM injection 5 mg, 5 mg, Intramuscular, Q8H PRN, Faheem Daniels MD    OLANZapine (ZyPREXA) tablet 5 mg, 5 mg, Oral, Q8H PRN, Faheem Daniels MD    ondansetron (ZOFRAN-ODT) dispersible tablet 4 mg, 4 mg, Oral, Q6H PRN, Faheem Daniels MD, 4 mg at 12/09/19 1757    pantoprazole (PROTONIX) EC tablet 40 mg, 40 mg, Oral, Early Morning, Faheem Daniels MD, 40 mg at 12/16/19 0606    polyethylene glycol (MIRALAX) packet 17 g, 17 g, Oral, Daily PRN, Faheem Daniels MD    polyvinyl alcohol (LIQUIFILM TEARS) 1 4 % ophthalmic solution 1 drop, 1 drop, Both Eyes, Q3H PRN, Faheem Daniels MD    sertraline (ZOLOFT) tablet 50 mg, 50 mg, Oral, BID, Faheem Daniels MD, 50 mg at 12/16/19 0531    sucralfate (CARAFATE) oral suspension 1,000 mg, 1,000 mg, Oral, BID, Cat Torres MD, 1,000 mg at 12/16/19 0606    theophylline (JEF-24) 24 hr capsule 200 mg, 200 mg, Oral, Daily, Vincent Olivares MD, 200 mg at 12/16/19 0680    tiotropium (SPIRIVA) capsule for inhaler 18 mcg, 18 mcg, Inhalation, Daily, Vincent Olivares MD, 18 mcg at 12/16/19 7671    traZODone (DESYREL) tablet 25 mg, 25 mg, Oral, HS PRN, Vincent Olivares MD  Justification if on more than two antipsychotics:  Only on his clozapine  Side effects if any:  None    Risks , benefits, side effects and precautions of medications discussed with patient and reminded patient to let us know any problems with medications     Objective:     Vital Signs:  Vitals:    12/15/19 1500 12/15/19 1900 12/16/19 0730 12/16/19 0732   BP: 90/56 (!) 82/53 107/69 101/62   BP Location: Left arm Left arm Left arm Left arm   Pulse: 66 64 81 76   Resp: 16 15 18    Temp: 97 6 °F (36 4 °C) 97 7 °F (36 5 °C) 97 9 °F (36 6 °C)    TempSrc: Temporal Temporal Temporal    SpO2: 92% 92%     Weight:       Height:         Appearance:  age appropriate, casually dressed and overweight older than stated age, friendly not too pleasant  with hair uncombed but better groomed with no body odor today and found laying on her bed   Behavior:  evasive and guarded and suspicious and preoccupied with psychosomatic complaints as usual but with better eye contact   Speech:  normal pitch and normal volume but circumstantial and tangential with stilted speech as usual   Mood:  anxious and dysthymic   Affect:  mood-congruent, elated and entitled anxious and irritated and sad at times but pleasant today    Thought Process:  goal directed and illogical slightly pressured and tangential and talks as if in a court of law   Thought Content:    Continues to have fixed set somatic believes as before but did not bring them up and no longer complains of spitting out blood     No current suicidal homicidal thoughts intent or plans reported  No phobias obsessions or compulsions reported  Still accusatory  to certain staff and fixated on multiple physical complaints   No longer believes there is a micro chip on 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   Perceptual Disturbances: None and does not appear responding to internal stimuli at times   Risk Potential: Tendency to not care for herself    Sensorium:  person, place, time/date, situation, day of week, month of year and time   Cognition:  grossly intact with no deficits in recent or remote memory or immediate recall   Consciousness:  alert and awake    Attention: Intact concentration and attention span   Intellect: Considered to be at least of average intelligence   Insight:  limited and in denial of her illness   Judgment: poor      Motor Activity: no abnormal movements         Recent Labs:  Results Reviewed     None          I/O Past 24 hours:  No intake/output data recorded  No intake/output data recorded  Assessment / Plan:     Schizoaffective disorder, bipolar [facility]      Reason for continued inpatient care:  Because of underlying paranoia and refusal to go back to the personal care home and inability to care for herself on her own  Acceptance by patient:  Accepting  Marianne Littlejohn in recovery:  About living in another personal care home once she feels better  Understanding of medications :  Has some understanding  Involved in reintegration process:  Adjusting to the unit  Trusting in relatoinship with psychiatrist:  Trusting    Recommended Treatment:    Medication changes:  1) none today  Non-pharmacological treatments  1) continue with  individual therapy group therapy, milieu therapy and occupational therapy and milieu therapy involving multidisciplinary team approach with psychotherapist, case management, nursing, peer support services, art therapist etc using recovery principles and psycho-education about accepting illness and need for treatment     2) encourage to cocoperate with behavior plan  3) encourage to attend to ADLs like taking showers and wearing clean clothes   4) Encourage to attend groups   Safety  1) Safety/communication plan established targeting dynamic risk factors above  Discharge Plan most likely back to the a:  Personal care boarding home with act services once her delusions are managed and mood becomes more stabilized and she attends to her ADLs she becomes more receptive to treatment compliance but she has not shown any improvement in the last 5 months since she has been on the unit and hence the referral needs to be initiated for the  [address] [place] [address] / Coordination of Care    Total floor / unit time spent today 15 minutes  Greater than 50% of total time was spent with the patient and / or family counseling and / or coordination of care  A description of the counseling / coordination of care  Patient's Rights, confidentiality and exceptions to confidentiality, use of automated medical record, [facility] adeline staff access to medical record, and consent to treatment reviewed      Jill Gayle MD

## 2019-12-16 NOTE — PROGRESS NOTES
Sleeping at this time, no s/s of distress noted  O2 on at 1L via NC  All precautions in place and maintained at this time   Monitoring continues

## 2019-12-16 NOTE — PROGRESS NOTES
12/16/19 0900   Team Meeting   Meeting Type Daily Rounds   Team Members Present   Team Members Present Physician;Nurse;   Physician Team Member Dr Francis Franco Team Member Lu Young RN   Care Management Team Member Brenda Hemphill     Patient was visited by a surgeon regarding her hernia procedure  Slept

## 2019-12-16 NOTE — PROGRESS NOTES
BRADY GROUP NOTE     12/16/19 0900   Activity/Group Checklist   Group [facility]  (Actively participated in group/interacted w peers  )   Attendance Attended   Attendance Duration (min) 0-15   Interactions Interacted appropriately   Affect/Mood Appropriate   Goals Achieved Able to listen to others; Able to engage in interactions

## 2019-12-16 NOTE — PROGRESS NOTES
12/16/19 1100   Activity/Group Checklist   Group   (IMR/Holistic Approach to Wellness )   Attendance Attended   Attendance Duration (min) Greater than 60   Interactions Interacted appropriately   Affect/Mood Appropriate;Normal range   Goals Achieved Identified feelings; Able to listen to others; Able to engage in interactions; Able to self-disclose

## 2019-12-16 NOTE — PROGRESS NOTES
Pharmacy Clozapine Monitoring Progress Note     Chad Meyer is receiving a total daily dose of 150 mg of clozapine divided as 75mg at 1600 and 75mg at 2000  Pharmacist Recommendations Based on Assessment and Plan (below):    1  Patient is Clozapine-REMS eligible, ANC was updated, with weekly monitoring frequency and the next [address] is due on 12/20/2019  Assessment/Plan:    Phase of Treatment:     Current phase of treatment is initation/titration  Patient Clozapine REMS Eligibility:     Patient is eligible to receive clozapine and the [address] monitoring frequency is weekly per clozapine REMS  The patient's latest [address] value has been updated into the Clozapine-REMS program          ANC and Clozapine Level (if available)     The most recent [address] was   Lab Results   Component Value Date    NEUTROABS 4 30 12/13/2019    and the next lab is due on 12/20/19  Last Clozapine level (if available):    0   Lab Value Date/Time    CLOZAPINE 324 (L) 08/16/2019 1131     0   Lab Value Date/Time    NCLOZIP 207 08/16/2019 1131           Monitoring Parameters for Clozapine:     Clozapine Adverse Effect Suggested Monitoring Patient's Current Status    Agranulocytosis ANC baseline and then repeat weekly for first 6 months and every 2 weeks for the second 6 months  Maintenance after one year of therapy every month  The most recent [address] was   Lab Results   Component Value Date    NEUTROABS 4 30 12/13/2019       This ANC is considered normal range (ANC >/= 1500/mcL)  Continue current treatment and monitoring course  Respiratory Depression Please ensure patient is not on concomitant respiratory lowering medications such as benzodiazepines, sedative hypnotics (eg  zolpidem) This patient is not on respiratory depression lowering medications   Myocarditis Baseline and weekly EKG, CRP, BNP, and troponin    Weekly symptomatic complaints of fatigue, dyspnea, tachycardia, chest pain, palpitations, and fever for the first 4 weeks  Last EKG Results on  12/11/19 showed: "Normal sinus rhythm  Left axis deviation  Abnormal ECG  When compared with ECG of 18-NOV-2019 13:19,  Premature ventricular complexes are no longer Present  Confirmed by Dilan Soni ([id number]) on 12/12/2019 8:45:13 AM"        Last QTC value was:  0   Lab Value Date/Time    QTCINT 467 12/11/2019 2041       Last BNP was: No results found for: NTBNP    Last Troponin was:  0   Lab Value Date/Time    TROPONINI <0 01 05/01/2019 1318    TROPONINI <0 04 05/10/2015 1948        Orthostatic hypotension/  bradycardia Blood pressure and vital signs      Monitor blood pressure and orthostatic hypotension at least twice daily upon initiation and continue to follow at least once daily afterwards  Patient's last recorded vital signs:       /62 (BP Location: Left arm)   Pulse 76   Temp 97 9 °F (36 6 °C) (Temporal)   Resp 18   Ht 5' 4" (1 626 m)   Wt 65 8 kg (145 lb)   SpO2 92%   BMI 24 89 kg/m²             Constipation (bowel obstruction due to high anticholinergic clozapine burden) Assess at baseline and weekly during first four months of therapy  Docusate 100mg BID and Miralax 17 grams daily recommended initially  Prophylactic bowel regimen ordered and includes: sucralfate   Sialorrhea Assess at baseline and weekly during first four months of therapy  May be managed using sublingual anticholinergic preparations (atropine 1% eye drops)  Patient is not experiencing sialorrhea  This will continue to be monitored  Please note that per current REMS protocol the provider can submit a treatment rationale that justifies clozapine treatment even if patient parameters are outside of REMS recommendations  The Clozapine REMS is an FDA-mandated program implemented by the manufacturers of clozapine  ([employer]  com/CpmgClozapineUI/home  u)  Pharmacy will continue to follow patient with team, thank you    Electronically signed by: Radha Sarmiento Edwar Alaniz, PharmD, [address], Clinical Pharmacist - Psychiatry

## 2019-12-16 NOTE — ASSESSMENT & PLAN NOTE
Psychiatry Progress Note  Patient reports that she has not taken any showers since last Wednesday but plans to get 1 today evening  She also claims that she is trying to attend groups at least 2 times a day and still wants to go back to the rest of the home  She was seen by surgical consult father had a hernia but she is reluctant to have the surgery done now and wants to wait it out until she gets out  Compliant with medications so far including the clozapine  No signs or sxs of agranulocytosis or myocarditis or endocarditis and she is moving her bowels so far with no issues   However she still has passive multiple psychosomatic complaints as before like dying from some terrible illness, having low blood sugar, inability to swallow or breathe etc  which have been changed much    She has been sleeping well and has been still using the oxygen concentrator at night and reports that she is happy to hear that she can go back to the  personal care home she came from if she keeps up with her improvement rather than be transferred to the [facility] hospital   She no longer voices any delusions that there may be a micro chip on 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   Current medications:    Current Facility-Administered Medications:     acetaminophen (TYLENOL) tablet 650 mg, 650 mg, Oral, Q6H PRN, Jaz Beasley MD    albuterol (PROVENTIL HFA,VENTOLIN HFA) inhaler 2 puff, 2 puff, Inhalation, Q4H PRN, Jaz Beasley MD, 2 puff at 10/11/19 0424    aluminum-magnesium hydroxide-simethicone (MYLANTA) 200-200-20 mg/5 mL oral suspension 15 mL, 15 mL, Oral, Q4H PRN, Jaz Beasley MD    ammonium lactate (LAC-HYDRIN) 12 % lotion 1 application, 1 application, Topical, BID PRN, Jaz Beasley MD    benzonatate (TESSALON PERLES) capsule 100 mg, 100 mg, Oral, TID PRN, Jaz Beasley MD    benztropine (COGENTIN) injection 1 mg, 1 mg, Intramuscular, Q8H PRN, Jaz Beasley MD    carbamide peroxide (DEBROX) 6 5 % otic solution 5 drop, 5 drop, Left Ear, BID, Rona Berg MD, 5 drop at 12/15/19 2137    cloZAPine (CLOZARIL) tablet 25 mg, 25 mg, Oral, BID, Carissa Willis MD, 25 mg at 12/15/19 2044    cloZAPine (CLOZARIL) tablet 50 mg, 50 mg, Oral, BID, Carissa Willis MD, 50 mg at 12/15/19 2044    EPINEPHrine PF (ADRENALIN) 1 mg/mL injection 0 15 mg, 0 15 mg, Intramuscular, Once PRN, Carissa Willis MD    fluticasone-vilanterol (BREO ELLIPTA) 200-25 MCG/INH inhaler 1 puff, 1 puff, Inhalation, Daily, Carissa Willis MD, 1 puff at 12/16/19 4099    ketotifen (ZADITOR) 0 025 % ophthalmic solution 1 drop, 1 drop, Right Eye, BID PRN, Carissa Willis MD    levothyroxine tablet 125 mcg, 125 mcg, Oral, Early Morning, Carissa Willis MD, 125 mcg at 12/16/19 0606    magnesium hydroxide (MILK OF MAGNESIA) 400 mg/5 mL oral suspension 30 mL, 30 mL, Oral, Daily PRN, Carissa Willis MD    montelukast (SINGULAIR) tablet 10 mg, 10 mg, Oral, HS, Carissa Willis MD, 10 mg at 12/15/19 2107    OLANZapine (ZyPREXA) IM injection 5 mg, 5 mg, Intramuscular, Q8H PRN, Carissa Willis MD    OLANZapine (ZyPREXA) tablet 5 mg, 5 mg, Oral, Q8H PRN, Carissa Willis MD    ondansetron (ZOFRAN-ODT) dispersible tablet 4 mg, 4 mg, Oral, Q6H PRN, Carissa Willis MD, 4 mg at 12/09/19 1757    pantoprazole (PROTONIX) EC tablet 40 mg, 40 mg, Oral, Early Morning, Carissa Willis MD, 40 mg at 12/16/19 0606    polyethylene glycol (MIRALAX) packet 17 g, 17 g, Oral, Daily PRN, Carissa Willis MD    polyvinyl alcohol (LIQUIFILM TEARS) 1 4 % ophthalmic solution 1 drop, 1 drop, Both Eyes, Q3H PRN, Carissa Willis MD    sertraline (ZOLOFT) tablet 50 mg, 50 mg, Oral, BID, Carissa Willis MD, 50 mg at 12/16/19 5871    sucralfate (CARAFATE) oral suspension 1,000 mg, 1,000 mg, Oral, BID, Cat Torres MD, 1,000 mg at 12/16/19 0606    theophylline (JEF-24) 24 hr capsule 200 mg, 200 mg, Oral, Daily, Carissa Willis MD, 200 mg at 12/16/19 0833    tiotropium (SPIRIVA) capsule for inhaler 18 mcg, 18 mcg, Inhalation, Daily, Carissa Willis MD, 18 mcg at 12/16/19 3345    traZODone (DESYREL) tablet 25 mg, 25 mg, Oral, HS PRN, Tree Madden MD  Justification if on more than two antipsychotics:  Only on his clozapine  Side effects if any:  None    Risks , benefits, side effects and precautions of medications discussed with patient and reminded patient to let us know any problems with medications     Objective:     Vital Signs:  Vitals:    12/15/19 1500 12/15/19 1900 12/16/19 0730 12/16/19 0732   BP: 90/56 (!) 82/53 107/69 101/62   BP Location: Left arm Left arm Left arm Left arm   Pulse: 66 64 81 76   Resp: 16 15 18    Temp: 97 6 °F (36 4 °C) 97 7 °F (36 5 °C) 97 9 °F (36 6 °C)    TempSrc: Temporal Temporal Temporal    SpO2: 92% 92%     Weight:       Height:         Appearance:  age appropriate, casually dressed and overweight older than stated age, friendly not too pleasant  with hair uncombed but better groomed with no body odor today and found laying on her bed   Behavior:  evasive and guarded and suspicious and preoccupied with psychosomatic complaints as usual but with better eye contact   Speech:  normal pitch and normal volume but circumstantial and tangential with stilted speech as usual   Mood:  anxious and dysthymic   Affect:  mood-congruent, elated and entitled anxious and irritated and sad at times but pleasant today    Thought Process:  goal directed and illogical slightly pressured and tangential and talks as if in a court of law   Thought Content:    Continues to have fixed set somatic believes as before but did not bring them up and no longer complains of spitting out blood     No current suicidal homicidal thoughts intent or plans reported  No phobias obsessions or compulsions reported  Still accusatory  to certain staff and fixated on multiple physical complaints     No longer believes there is a micro chip on 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   Perceptual Disturbances: None and does not appear responding to internal stimuli at times   Risk Potential: Tendency to not care for herself    Sensorium:  person, place, time/date, situation, day of week, month of year and time   Cognition:  grossly intact with no deficits in recent or remote memory or immediate recall   Consciousness:  alert and awake    Attention: Intact concentration and attention span   Intellect: Considered to be at least of average intelligence   Insight:  limited and in denial of her illness   Judgment: poor      Motor Activity: no abnormal movements         Recent Labs:  Results Reviewed     None          I/O Past 24 hours:  No intake/output data recorded  No intake/output data recorded  Assessment / Plan:     Sch[facility]oaffective disorder, [facility] ([facility]      Reason for continued inpatient care:  Because of underlying paranoia and refusal to go back to the personal care home and inability to care for herself on her own  Acceptance by patient:  Accepting  Filomena Powell in recovery:  About living in another personal care home once she feels better  Understanding of medications :  Has some understanding  Involved in reintegration process:  Adjusting to the unit  Trusting in relatoinship with psychiatrist:  Trusting    Recommended Treatment:    Medication changes:  1) none today  Non-pharmacological treatments  1) continue with  individual therapy group therapy, milieu therapy and occupational therapy and milieu therapy involving multidisciplinary team approach with psychotherapist, case management, nursing, peer support services, art therapist etc using recovery principles and psycho-education about accepting illness and need for treatment   2) encourage to cocoperate with behavior plan  3) encourage to attend to ADLs like taking showers and wearing clean clothes   4) Encourage to attend groups   Safety  1) Safety/communication plan established targeting dynamic risk factors above    Discharge Plan most likely back to the a:  Personal care boarding home with act services once her delusions are managed and mood becomes more stabilized and she attends to her ADLs she becomes more receptive to treatment compliance but she has not shown any improvement in the last 5 months since she has been on the unit and hence the referral needs to be initiated for the  [address] [place] [address] / Coordination of Care    Total floor / unit time spent today 15 minutes  Greater than 50% of total time was spent with the patient and / or family counseling and / or coordination of care  A description of the counseling / coordination of care  Patient's Rights, confidentiality and exceptions to confidentiality, use of automated medical record, [facility] staff access to medical record, and consent to treatment reviewed      Jerome Lopez MD

## 2019-12-17 NOTE — PROGRESS NOTES
Patient not scheduled to attend      12/17/19 1430   Activity/Group Checklist   Group   (Community Programming Wrap Up )   Attendance Did not attend   Attendance Duration (min) 16-30   Affect/Mood IRMA

## 2019-12-17 NOTE — PLAN OF CARE
Problem: Ineffective Coping  Goal: Participates in unit activities  Description  Interventions:  - Provide therapeutic environment   - Provide required programming   - Redirect inappropriate behaviors   Outcome: Not Progressing  Patient did not complete Goal card for the week of 12/16/19

## 2019-12-17 NOTE — PROGRESS NOTES
12/17/19 0900   Team Meeting   Meeting Type Daily Rounds   Team Members Present   Team Members Present Physician;Nurse;; Other (Discipline and Name)   Physician Team Member Dr Beth Kang, RN   Care Management Team Member Brenda Rodriguez   Other (Discipline and Name) Julia Duran LCSW     Patient attended some groups yesterday  Showered yesterday but didn't wash hair since the 11th  Slept

## 2019-12-17 NOTE — ASSESSMENT & PLAN NOTE
Psychiatry Progress Note  Patient reports that she took a shower yesterday but did not wet her hair and plans to do that tonight and has been now attending 53% of groups as well  She was seen by surgical consult for the hiatal  hernia but she is reluctant to have the surgery  now and wants to wait it out until she gets out as outpatient only  Compliant with medications so far including the clozapine and reports no signs or sxs of agranulocytosis or myocarditis or endocarditis and she is moving her bowels so far with no issues   However she continues to express some  passive multiple psychosomatic complaints as before like dying from some terrible illness, having low blood sugar, inability to swallow or breathe etc  She has been sleeping well and has been still using the oxygen concentrator at night and reports that she is happy to hear that she can go back to the [facility] home she came from if she keeps up with her improvement rather than be transferred to the [facility] hospital   She no longer voices any delusions that there may be a micro chip on 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 but periodically gets suspicious about certain staff tampering with her meds, the spirometer or her oxygen concentrator      Current medications:    Current Facility-Administered Medications:     acetaminophen (TYLENOL) tablet 650 mg, 650 mg, Oral, Q6H PRN, Faheem Daniels MD    albuterol (PROVENTIL HFA,VENTOLIN HFA) inhaler 2 puff, 2 puff, Inhalation, Q4H PRN, Faheem Daniels MD, 2 puff at 10/11/19 0424    aluminum-magnesium hydroxide-simethicone (MYLANTA) 200-200-20 mg/5 mL oral suspension 15 mL, 15 mL, Oral, Q4H PRN, Faheem Daniels MD    ammonium lactate (LAC-HYDRIN) 12 % lotion 1 application, 1 application, Topical, BID PRN, Faheem Daniels MD    benzonatate (TESSALON PERLES) capsule 100 mg, 100 mg, Oral, TID PRN, Faheem Daniels MD    benztropine (COGENTIN) injection 1 mg, 1 mg, Intramuscular, Q8H PRN, Faheem Daniels MD    carbamide peroxide (DEBROX) 6 5 % otic solution 5 drop, 5 drop, Left Ear, BID, Rona Aguayo MD, 5 drop at 12/17/19 0830    cloZAPine (CLOZARIL) tablet 25 mg, 25 mg, Oral, BID, Meredith Wesley MD, 25 mg at 12/16/19 2104    cloZAPine (CLOZARIL) tablet 50 mg, 50 mg, Oral, BID, Meredith Wesley MD, 50 mg at 12/16/19 2104    EPINEPHrine PF (ADRENALIN) 1 mg/mL injection 0 15 mg, 0 15 mg, Intramuscular, Once PRN, Meredith Wesley MD    fluticasone-vilanterol (BREO ELLIPTA) 200-25 MCG/INH inhaler 1 puff, 1 puff, Inhalation, Daily, Meredith Wesley MD, 1 puff at 12/17/19 0829    ketotifen (ZADITOR) 0 025 % ophthalmic solution 1 drop, 1 drop, Right Eye, BID PRN, Meredith Wesley MD    levothyroxine tablet 125 mcg, 125 mcg, Oral, Early Morning, Meredith Wesley MD, 125 mcg at 12/17/19 0556    magnesium hydroxide (MILK OF MAGNESIA) 400 mg/5 mL oral suspension 30 mL, 30 mL, Oral, Daily PRN, Meredith Wesley MD    montelukast (SINGULAIR) tablet 10 mg, 10 mg, Oral, HS, Meredith Wesley MD, 10 mg at 12/15/19 2107    OLANZapine (ZyPREXA) IM injection 5 mg, 5 mg, Intramuscular, Q8H PRN, Meredith Wesley MD    OLANZapine (ZyPREXA) tablet 5 mg, 5 mg, Oral, Q8H PRN, Meredith Wesley MD    ondansetron (ZOFRAN-ODT) dispersible tablet 4 mg, 4 mg, Oral, Q6H PRN, Meredith Wesley MD, 4 mg at 12/09/19 1757    pantoprazole (PROTONIX) EC tablet 40 mg, 40 mg, Oral, Early Morning, Meredith Wesley MD, 40 mg at 12/17/19 0557    polyethylene glycol (MIRALAX) packet 17 g, 17 g, Oral, Daily PRN, Meredith Wesley MD    polyvinyl alcohol (LIQUIFILM TEARS) 1 4 % ophthalmic solution 1 drop, 1 drop, Both Eyes, Q3H PRN, Meredith Wesley MD    sertraline (ZOLOFT) tablet 50 mg, 50 mg, Oral, BID, Meredith Wesley MD, 50 mg at 12/17/19 0217    sucralfate (CARAFATE) oral suspension 1,000 mg, 1,000 mg, Oral, BID, Cat Torres MD, 1,000 mg at 12/17/19 0600    theophylline (JEF-24) 24 hr capsule 200 mg, 200 mg, Oral, Daily, Meredith Wesley MD, 200 mg at 12/17/19 0829    tiotropium (SPIRIVA) capsule for inhaler 18 mcg, 18 mcg, Inhalation, Daily, Manuel Copeland MD, 18 mcg at 12/17/19 0829    traZODone (DESYREL) tablet 25 mg, 25 mg, Oral, HS PRN, Manuel Copeland MD  Justification if on more than two antipsychotics:  Only on his clozapine  Side effects if any:  None    Risks , benefits, side effects and precautions of medications discussed with patient and reminded patient to let us know any problems with medications     Objective:     Vital Signs:  Vitals:    12/16/19 0732 12/16/19 1617 12/17/19 0625 12/17/19 0700   BP: 101/62 117/65  118/67   BP Location: Left arm Left arm  Right arm   Pulse: 76 85  86   Resp:  18  18   Temp:  97 7 °F (36 5 °C)  97 7 °F (36 5 °C)   TempSrc:  Probe     SpO2:  91% 94%    Weight:       Height:         Appearance:  age appropriate, casually dressed and overweight older than stated age, friendly polite and pleasant  with hair uncombed but better groomed with no body odor today   Behavior:  evasive and guarded but less suspicious and still preoccupied with psychosomatic complaints as usual but with better eye contact   Speech:  normal pitch and normal volume but circumstantial and tangential with stilted speech    Mood:  anxious and dysthymic   Affect:  mood-congruent, elated and entitled anxious and irritated and sad at times but pleasant today    Thought Process:  goal directed and illogical slightly pressured and tangential and talks as if in a court of law   Thought Content:    Continues to have fixated somatic beliefs as before but did not bring them up and no longer complains of spitting out blood since last week     No current suicidal homicidal thoughts intent or plans reported  No phobias obsessions or compulsions reported  Still accusatory  to certain staff and fixated on multiple physical complaints     No longer believes there is a micro chip on 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 and [facility]   Perceptual Disturbances: None and does not appear responding to internal stimuli at times   Risk Potential: Tendency to not care for herself    Sensorium:  person, place, time/date, situation, day of week, month of year and time   Cognition:  grossly intact with no deficits in recent or remote memory or immediate recall   Consciousness:  alert and awake    Attention: Intact concentration and attention span   Intellect: Considered to be at least of average intelligence   Insight:  limited but improving   Judgment: improving      Motor Activity: no abnormal movements         Recent Labs:  Results Reviewed     None          I/O Past 24 hours:  No intake/output data recorded  No intake/output data recorded  Assessment / Plan:     Schiz[facility]ffective [facility], [facility] ([facility]      Reason for continued inpatient care:  Because of underlying paranoia and refusal to go back to the personal care home and inability to care for herself on her own  Acceptance by patient:  Accepting  Kitty Sicard in recovery:  About living in another personal care home once she feels better  Understanding of medications :  Has some understanding  Involved in reintegration process:  Adjusting to the unit  Trusting in relatoinship with psychiatrist:  Trusting    Recommended Treatment:    Medication changes:  1) none today  Non-pharmacological treatments  1) continue with  individual therapy group therapy, milieu therapy and occupational therapy and milieu therapy involving multidisciplinary team approach with psychotherapist, case management, nursing, peer support services, art therapist etc using recovery principles and psycho-education about accepting illness and need for treatment     2) encourage to cocoperate with behavior plan  3) encourage to attend to ADLs like taking showers and wearing clean clothes   4) Encourage to attend groups   5) see how she does on off unit privileges again and expectations discussed with her and she did verbalize an understanding  Safety  1) Safety/communication plan established targeting dynamic risk factors above  Discharge Plan most likely back to the a:  Personal care boarding home with act services once her delusions are managed and mood becomes more stabilized and she attends to her ADLs she becomes more receptive to treatment compliance but she has not shown any improvement in the last 5 months since she has been on the unit and hence the referral needs to be initiated for the  [address] [place] [address] / Coordination of Care    Total floor / unit time spent today 15 minutes  Greater than 50% of total time was spent with the patient and / or family counseling and / or coordination of care  A description of the counseling / coordination of care  Patient's Rights, confidentiality and exceptions to confidentiality, use of automated medical record, [facility] staff access to medical record, and consent to treatment reviewed      Manuel Copeland MD

## 2019-12-17 NOTE — PLAN OF CARE
Problem: Alteration in Thoughts and Perception  Goal: Agree to be compliant with medication regime, as prescribed and report medication side effects  Description  Interventions:  - Offer appropriate PRN medication and supervise ingestion; conduct aims, as needed   12/17/2019 1437 by Beryle Bicker, RN  Outcome: Progressing  12/17/2019 1436 by Beryle Bicker, RN  Outcome: Progressing  Goal: Attend and participate in unit activities, including therapeutic, recreational, and educational groups  Description  Interventions:  - Provide therapeutic and educational activities daily, encourage attendance and participation, and document same in the medical record     CERTIFIED PEER SPECIALIST INTERVENTIONS:    Complete peer assessment with patient to assess their needs and identify their goals to complete while in the recovery program as well as once discharged into the community  Patient will complete WRAP Plan, Crisis Plan and [facility]ains  Patient will attend 50% of groups offered on the unit  Patient will complete a goal card weekly  12/17/2019 1437 by Beryle Bicker, RN  Outcome: Progressing  12/17/2019 1436 by Beryle Bicker, RN  Outcome: Progressing  Goal: Complete daily ADLs, including personal hygiene independently, as able  Description  Interventions:  - Observe, teach, and assist patient with ADLS  - Monitor and promote a balance of rest/activity, with adequate nutrition and elimination   Outcome: Progressing       Problem: Anxiety  Goal: Anxiety is at manageable level  Description  Interventions:  - Assess and monitor patient's anxiety level  - Monitor for signs and symptoms of anxiety both physical and emotional (heart palpitations, chest pain, shortness of breath, headaches, nausea, feeling jumpy, restlessness, irritable, apprehensive)  - Collaborate with interdisciplinary team and initiate plan and interventions as ordered    - [place] patient to unit/surroundings  - Explain treatment plan  - Encourage participation in care  - Encourage verbalization of concerns/fears  - Identify coping mechanisms  - Assist in developing anxiety-reducing skills  - Administer/offer alternative therapies  - Limit or eliminate stimulants  Outcome: Jennifer Araujo has been intermittently visible on the unit in between select groups and meal  Appetite still remains poor as she chose to consume her liquids and preferring softer foods instead  Otherwise she refrained from expressing somatic complaints and was compliant with her medications and meals spending more time out of bed with encouragement from staff  No complaints voiced at this time  Will continue to montior for changes

## 2019-12-17 NOTE — PROGRESS NOTES
12/17/19 0915   Team Meeting   Meeting Type Tx Team Meeting   Initial Conference Date 12/17/19   Next Conference Date 12/24/19   Team Members Present   Team Members Present Physician;Nurse;; Other (Discipline and Name)   Physician Team Member Dr Leatha Lewis Team Member Marlo Fairbanks, LISA   Care Management Team Member Brenda Vargas   Other (Discipline and Name) Cristiano Byers, [facility]; Jazmin Ariza, [facility]   Patient/Family Present   Patient Present Yes   Patient's Family Present No     Patient attended treatment team meeting this morning without a self assessment completed  Patient attended 53% of groups offered last week  Patient was informed that she can now go off unit with staff  Team encouraged her to keep up the good work of going to groups  She stated she does feel better now that she is going to more groups  Dr Carmelina Liao reported if she continues to improve the team will be able to retract her [facility] referral  Team and patient completed risk assessment and the patient did not verbalize any desire to elope from the program  Patient verbalized understanding of consequences of eloping from treatment while on a commitment   Patient verbalized no further questions or concerns at the conclusion of the meeting

## 2019-12-17 NOTE — PROGRESS NOTES
Visible intermittently, social with staff and select peers when out of room  Showered with staff assistance but did not wash hair  No somatic complaints voiced this evening  Ate 50% of dinner: ate egg salad, ice cream, and drank her ensure, did not eat the bread from the egg salad sandwich or drink her other fluids  Med compliant with prompting  Continue to monitor

## 2019-12-17 NOTE — PLAN OF CARE
Problem: Alteration in Thoughts and Perception  Goal: Verbalize thoughts and feelings  Description  Interventions:  - Promote a nonjudgmental and trusting relationship with the patient through active listening and therapeutic communication  - Assess patient's level of functioning, behavior and potential for risk  - Engage patient in 1 on 1 interactions for a minimum of 15 minutes each session  - Encourage patient to express fears, feelings, frustrations, and discuss symptoms    - [place] patient to reality, help patient recognize reality-based thinking   - Administer medications as ordered and assess for potential side effects  - Provide the patient education related to the signs and symptoms of the illness and desired effects of prescribed medications  Outcome: Progressing    Patient asked to speak with therapist to confirm information for going off the unit  She reports she is going to continuing attending groups so she does not lose her privileges  Therapist agreed this is a good idea  Patient shared concerns about going to the [address] [place] [address] without oxygen, and therapist explained that it would be just a few feet further than the walk from her bedroom to the living room  Patient was encouraged to differentiate between reality-based fears, proving evidence, and somatic fears  Patient was offered a trip off the unit with just therapist so that she would not have to go with a large group to [facility]  Patient agreed to this, but reports she is "not ready" and would like to wait until tomorrow  Therapist will revisit an off-unit trip with patient tomorrow  Patient's ultimate goal would to be able to go to the gift shop with her sister    She was reminded that going off-unit with therapist would be a step towards that goal

## 2019-12-17 NOTE — PLAN OF CARE
Problem: Alteration in Thoughts and Perception  Goal: Attend and participate in unit activities, including therapeutic, recreational, and educational groups  Description  Interventions:  - Provide therapeutic and educational activities daily, encourage attendance and participation, and document same in the medical record     CERTIFIED PEER SPECIALIST INTERVENTIONS:    Complete peer assessment with patient to assess their needs and identify their goals to complete while in the recovery program as well as once discharged into the community  Patient will complete WRAP Plan, Crisis Plan and 5 Life Domains  Patient will attend 50% of groups offered on the unit  Patient will complete a goal card weekly  Outcome: Progressing  Patient able to obtain and maintain a 53% group attendance  Patient refraining from focusing/speaking only about medical issues in group settings and has had an increase in concentration on group topics  Patient encouraged to complete weekly goal cards and work on her ybuy however, she has yet to do so

## 2019-12-17 NOTE — PROGRESS NOTES
12/17/19 1500   Activity/Group Checklist   Group   (Recovery Workshop )   Attendance Attended   Attendance Duration (min) 46-60   Interactions Interacted appropriately   Affect/Mood Appropriate;Normal range   Goals Achieved Identified feelings; Able to listen to others; Able to engage in interactions; Able to self-disclose

## 2019-12-17 NOTE — PROGRESS NOTES
Patient attended and participated in [facility] [address] breathing techniques      12/17/19 1100   Activity/Group Checklist   Group   (IMR/Holistic Approach to Wellness)   Attendance Attended   Attendance Duration (min) 46-60   Interactions Interacted appropriately   Affect/Mood Appropriate;Normal range   Goals Achieved Identified feelings; Able to listen to others; Able to engage in interactions; Able to self-disclose

## 2019-12-17 NOTE — PROGRESS NOTES
Progress Note - Cathy Livers 1955, 60 y o  female MRN: [id number]    [facility] Encounter: [id number]    Primary Care Provider: Donato Jackson PA-C   Date and time admitted to hospital: 7/23/2019  5:30 PM        * Schizoaffective disorder, bipolar type [facility])  Assessment & Plan  Psychiatry Progress Note  Patient reports that she took a shower yesterday but did not wet her hair and plans to do that tonight and has been now attending 53% of groups as well  She was seen by surgical consult for the hiatal  hernia but she is reluctant to have the surgery  now and wants to wait it out until she gets out as outpatient only  Compliant with medications so far including the clozapine and reports no signs or sxs of agranulocytosis or myocarditis or endocarditis and she is moving her bowels so far with no issues   However she continues to express some  passive multiple psychosomatic complaints as before like dying from some terrible illness, having low blood sugar, inability to swallow or breathe etc  She has been sleeping well and has been still using the oxygen concentrator at night and reports that she is happy to hear that she can go back to the [facility] home she came from if she keeps up with her improvement rather than be transferred to the [facility] hospital   She no longer voices any delusions that there may be a micro chip on 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 but periodically gets suspicious about certain staff tampering with her meds, the spirometer or her oxygen concentrator      Current medications:    Current Facility-Administered Medications:     acetaminophen (TYLENOL) tablet 650 mg, 650 mg, Oral, Q6H PRN, Isidoro Gonzalez MD    albuterol (PROVENTIL HFA,VENTOLIN HFA) inhaler 2 puff, 2 puff, Inhalation, Q4H PRN, Isidoro Gonzalez MD, 2 puff at 10/11/19 0424    aluminum-magnesium hydroxide-simethicone (MYLANTA) 200-200-20 mg/5 mL oral suspension 15 mL, 15 mL, Oral, Q4H PRN, Radu Bennett Carlee Yadav MD    ammonium lactate (LAC-HYDRIN) 12 % lotion 1 application, 1 application, Topical, BID PRN, Deep Durand MD    benzonatate (TESSALON PERLES) capsule 100 mg, 100 mg, Oral, TID PRN, Deep Durand MD    benztropine (COGENTIN) injection 1 mg, 1 mg, Intramuscular, Q8H PRN, Deep Durand MD    carbamide peroxide (DEBROX) 6 5 % otic solution 5 drop, 5 drop, Left Ear, BID, Rona Nogueira MD, 5 drop at 12/17/19 0830    cloZAPine (CLOZARIL) tablet 25 mg, 25 mg, Oral, BID, Deep Durand MD, 25 mg at 12/16/19 2104    cloZAPine (CLOZARIL) tablet 50 mg, 50 mg, Oral, BID, Deep Durand MD, 50 mg at 12/16/19 2104    EPINEPHrine PF (ADRENALIN) 1 mg/mL injection 0 15 mg, 0 15 mg, Intramuscular, Once PRN, Deep Durand MD    fluticasone-vilanterol (BREO ELLIPTA) 200-25 MCG/INH inhaler 1 puff, 1 puff, Inhalation, Daily, Deep Durand MD, 1 puff at 12/17/19 0829    ketotifen (ZADITOR) 0 025 % ophthalmic solution 1 drop, 1 drop, Right Eye, BID PRN, Deep Durand MD    levothyroxine tablet 125 mcg, 125 mcg, Oral, Early Morning, Deep Durand MD, 125 mcg at 12/17/19 0556    magnesium hydroxide (MILK OF MAGNESIA) 400 mg/5 mL oral suspension 30 mL, 30 mL, Oral, Daily PRN, Deep Durand MD    montelukast (SINGULAIR) tablet 10 mg, 10 mg, Oral, HS, Deep Durand MD, 10 mg at 12/15/19 2107    OLANZapine (ZyPREXA) IM injection 5 mg, 5 mg, Intramuscular, Q8H PRN, Deep Durand MD    OLANZapine (ZyPREXA) tablet 5 mg, 5 mg, Oral, Q8H PRN, Deep Durand MD    ondansetron (ZOFRAN-ODT) dispersible tablet 4 mg, 4 mg, Oral, Q6H PRN, Deep Durand MD, 4 mg at 12/09/19 1757    pantoprazole (PROTONIX) EC tablet 40 mg, 40 mg, Oral, Early Morning, Deep Durand MD, 40 mg at 12/17/19 0557    polyethylene glycol (MIRALAX) packet 17 g, 17 g, Oral, Daily PRN, Deep Durand MD    polyvinyl alcohol (LIQUIFILM TEARS) 1 4 % ophthalmic solution 1 drop, 1 drop, Both Eyes, Q3H PRN, Deep Durand MD    sertraline (ZOLOFT) tablet 50 mg, 50 mg, Oral, BID, Sindy Day MD, 50 mg at 12/17/19 7851    sucralfate (CARAFATE) oral suspension 1,000 mg, 1,000 mg, Oral, BID, Cat Torres MD, 1,000 mg at 12/17/19 0600    theophylline (JEF-24) 24 hr capsule 200 mg, 200 mg, Oral, Daily, Sindy Day MD, 200 mg at 12/17/19 0829    tiotropium (SPIRIVA) capsule for inhaler 18 mcg, 18 mcg, Inhalation, Daily, Sindy Day MD, 18 mcg at 12/17/19 3654    traZODone (DESYREL) tablet 25 mg, 25 mg, Oral, HS PRN, Sindy Day MD  Justification if on more than two antipsychotics:  Only on his clozapine  Side effects if any:  None    Risks , benefits, side effects and precautions of medications discussed with patient and reminded patient to let us know any problems with medications     Objective:     Vital Signs:  Vitals:    12/16/19 0732 12/16/19 1617 12/17/19 0625 12/17/19 0700   BP: 101/62 117/65  118/67   BP Location: Left arm Left arm  Right arm   Pulse: 76 85  86   Resp:  18  18   Temp:  97 7 °F (36 5 °C)  97 7 °F (36 5 °C)   TempSrc:  Probe     SpO2:  91% 94%    Weight:       Height:         Appearance:  age appropriate, casually dressed and overweight older than stated age, friendly polite and pleasant  with hair uncombed but better groomed with no body odor today   Behavior:  evasive and guarded but less suspicious and still preoccupied with psychosomatic complaints as usual but with better eye contact   Speech:  normal pitch and normal volume but circumstantial and tangential with stilted speech    Mood:  anxious and dysthymic   Affect:  mood-congruent, elated and entitled anxious and irritated and sad at times but pleasant today    Thought Process:  goal directed and illogical slightly pressured and tangential and talks as if in a court of law   Thought Content:    Continues to have fixated somatic beliefs as before but did not bring them up and no longer complains of spitting out blood since last week     No current suicidal homicidal thoughts intent or plans reported    No phobias obsessions or compulsions reported  Still accusatory  to certain staff and fixated on multiple physical complaints   No longer believes there is a micro chip on 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 and looking forward to tryingtogo back to the [employer]   Perceptual Disturbances: None and does not appear responding to internal stimuli at times   Risk Potential: Tendency to not care for herself    Sensorium:  person, place, time/date, situation, day of week, month of year and time   Cognition:  grossly intact with no deficits in recent or remote memory or immediate recall   Consciousness:  alert and awake    Attention: Intact concentration and attention span   Intellect: Considered to be at least of average intelligence   Insight:  limited but improving   Judgment: improving      Motor Activity: no abnormal movements         Recent Labs:  Results Reviewed     None          I/O Past 24 hours:  No intake/output data recorded  No intake/output data recorded          Assessment / Plan:     [facility]ffective [facility]      Reason for continued inpatient care:  Because of underlying paranoia and refusal to go back to the personal care home and inability to care for herself on her own  Acceptance by patient:  Accepting  Mateusz Mcghee in recovery:  About living in another personal care home once she feels better  Understanding of medications :  Has some understanding  Involved in reintegration process:  Adjusting to the unit  Trusting in relatoinship with psychiatrist:  Trusting    Recommended Treatment:    Medication changes:  1) none today  Non-pharmacological treatments  1) continue with  individual therapy group therapy, milieu therapy and occupational therapy and milieu therapy involving multidisciplinary team approach with psychotherapist, case management, nursing, peer support services, art therapist etc using recovery principles and psycho-education about accepting illness and need for treatment    2) encourage to cocoperate with behavior plan  3) encourage to attend to ADLs like taking showers and wearing clean clothes   4) Encourage to attend groups   5) see how she does on off unit privileges again and expectations discussed with her and she did verbalize an understanding  Safety  1) Safety/communication plan established targeting dynamic risk factors above  Discharge Plan most likely back to the a:  Personal care boarding home with act services once her delusions are managed and mood becomes more stabilized and she attends to her ADLs she becomes more receptive to treatment compliance but she has not shown any improvement in the last 5 months since she has been on the unit and hence the referral needs to be initiated for the  [address] [place] [address] / Coordination of Care    Total floor / unit time spent today 15 minutes  Greater than 50% of total time was spent with the patient and / or family counseling and / or coordination of care  A description of the counseling / coordination of care  Patient's Rights, confidentiality and exceptions to confidentiality, use of automated medical record, [address] [place] [address] staff access to medical record, and consent to treatment reviewed      Shilpa Trujillo MD

## 2019-12-18 NOTE — PROGRESS NOTES
[facility] had an HS snack of 1/2 yogurt, 3 sugar cookies, milk  While having snack was conversational w/staff in dining room, exhibiting mischievous wit  Took all HS medicine except the Singulair  Used the Incentive spirometer achieving consistent volumes of 1250ml  Wearing now her QHS humidified nasal O2 @ 1L for bed

## 2019-12-18 NOTE — PROGRESS NOTES
Progress Note - Madison Miner 1955, 60 y o  female MRN: [id number]    [facility] Encounter: [id number]    Primary Care Provider: Poli Nguyen PA-C   Date and time admitted to hospital: 7/23/2019  5:30 PM        * Schizoaffective disorder, bipolar type [facility])  Assessment & Plan  Psychiatry Progress Note  Patient reports that she  wet her hair today and plans to do that tonight and has been now attending more of the groups including IMR groups as well  She is still  reluctant to have the surgery now and wants to wait it out until she gets out as outpatient only  Compliant with medications so far including the clozapine and reports no signs or sxs of agranulocytosis or myocarditis or endocarditis and she is moving her bowels so far with no issues   She did not verbalize any  psychosomatic complaints today as before like dying from some terrible illness, having low blood sugar, inability to swallow or breathe etc  She has been sleeping well and has been still using the oxygen concentrator at night and reports that she is happy to hear that she can go back to the [facility] home she came from if she keeps up with her improvement rather than be transferred to the [facility]  She is now wanting to go on a pass with her sister as well for new year  She no longer voices any delusions that there may be a micro chip on 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 but periodically gets suspicious about certain staff tampering with her meds, the spirometer or her oxygen concentrator but did not talk about it today and was pleasant and friendly when approached today      Current medications:    Current Facility-Administered Medications:     acetaminophen (TYLENOL) tablet 650 mg, 650 mg, Oral, Q6H PRN, Alirio Frazier MD    albuterol (PROVENTIL HFA,VENTOLIN HFA) inhaler 2 puff, 2 puff, Inhalation, Q4H PRN, Alirio Frazier MD, 2 puff at 10/11/19 0424    aluminum-magnesium hydroxide-simethicone (MYLANTA) 200-200-20 mg/5 mL oral suspension 15 mL, 15 mL, Oral, Q4H PRN, Shilpa Trujillo MD    ammonium lactate (LAC-HYDRIN) 12 % lotion 1 application, 1 application, Topical, BID PRN, Shilpa Trujillo MD    benzonatate (TESSALON PERLES) capsule 100 mg, 100 mg, Oral, TID PRN, Shilpa Trujillo MD    benztropine (COGENTIN) injection 1 mg, 1 mg, Intramuscular, Q8H PRN, Shilpa Trujillo MD    carbamide peroxide (DEBROX) 6 5 % otic solution 5 drop, 5 drop, Left Ear, BID, Rona Lee MD, 5 drop at 12/18/19 0829    cloZAPine (CLOZARIL) tablet 25 mg, 25 mg, Oral, BID, Shilpa Trujillo MD, 25 mg at 12/17/19 2151    cloZAPine (CLOZARIL) tablet 50 mg, 50 mg, Oral, BID, Shilpa Trujillo MD, 50 mg at 12/17/19 2152    EPINEPHrine PF (ADRENALIN) 1 mg/mL injection 0 15 mg, 0 15 mg, Intramuscular, Once PRN, Shilpa Trujillo MD    fluticasone-vilanterol (BREO ELLIPTA) 200-25 MCG/INH inhaler 1 puff, 1 puff, Inhalation, Daily, Shilpa Trujillo MD, 1 puff at 12/18/19 0824    ketotifen (ZADITOR) 0 025 % ophthalmic solution 1 drop, 1 drop, Right Eye, BID PRN, Shilpa Trujillo MD    levothyroxine tablet 125 mcg, 125 mcg, Oral, Early Morning, Shilpa Trujillo MD, 125 mcg at 12/18/19 0636    magnesium hydroxide (MILK OF MAGNESIA) 400 mg/5 mL oral suspension 30 mL, 30 mL, Oral, Daily PRN, Shilpa Trujillo MD    montelukast (SINGULAIR) tablet 10 mg, 10 mg, Oral, HS, Shilpa Trujillo MD, 10 mg at 12/15/19 2107    OLANZapine (ZyPREXA) IM injection 5 mg, 5 mg, Intramuscular, Q8H PRN, Shilpa Trujillo MD    OLANZapine (ZyPREXA) tablet 5 mg, 5 mg, Oral, Q8H PRN, Shilpa Trujillo MD    ondansetron (ZOFRAN-ODT) dispersible tablet 4 mg, 4 mg, Oral, Q6H PRN, Shilpa Trujillo MD, 4 mg at 12/09/19 1757    pantoprazole (PROTONIX) EC tablet 40 mg, 40 mg, Oral, Early Morning, Shilpa Trujillo MD, 40 mg at 12/18/19 0636    polyethylene glycol (MIRALAX) packet 17 g, 17 g, Oral, Daily PRN, Shilpa Trujillo MD    polyvinyl alcohol (LIQUIFILM TEARS) 1 4 % ophthalmic solution 1 drop, 1 drop, Both Eyes, Q3H PRN, Vincent Olivares MD    sertraline (ZOLOFT) tablet 50 mg, 50 mg, Oral, BID, Vincent Olivares MD, 50 mg at 12/18/19 0825    sucralfate (CARAFATE) oral suspension 1,000 mg, 1,000 mg, Oral, BID, Temitope Peterson MD, 1,000 mg at 12/18/19 0636    theophylline (JEF-24) 24 hr capsule 200 mg, 200 mg, Oral, Daily, Vincent Olivares MD, 200 mg at 12/18/19 0825    tiotropium [facility]) capsule for inhaler 18 mcg, 18 mcg, Inhalation, Daily, Vincent Olivares MD, 18 mcg at 12/18/19 0824    traZODone (DESYREL) tablet 25 mg, 25 mg, Oral, HS PRN, Vincent Olivares MD  Justification if on more than two antipsychotics:  Only on his clozapine  Side effects if any:  None    Risks , benefits, side effects and precautions of medications discussed with patient and reminded patient to let us know any problems with medications     Objective:     Vital Signs:  Vitals:    12/17/19 0700 12/17/19 1603 12/17/19 2000 12/18/19 0730   BP: 118/67 106/68 96/54 120/63   BP Location: Right arm Right arm Left arm Left arm   Pulse: 86 93 69 86   Resp: 18 14 16 16   Temp: 97 7 °F (36 5 °C) (!) 97 4 °F (36 3 °C) 98 5 °F (36 9 °C) 98 3 °F (36 8 °C)   TempSrc:  Temporal Temporal Temporal   SpO2:  91% 91%    Weight:       Height:         Appearance:  age appropriate, casually dressed and overweight older than stated age, friendly polite and pleasant  with hair uncombed but better groomed    Behavior:  evasive and guarded but less suspicious and still preoccupied with no psychosomatic complaints today    Speech:  normal pitch and normal volume but circumstantial and tangential with stilted speech    Mood:  anxious and dysthymic   Affect:  mood-congruent, elated and entitled anxious and irritated and sad at times but pleasant today    Thought Process:  goal directed and illogical slightly pressured and tangential and talks as if in a court of law   Thought Content:   Continues to have fixated somatic beliefs as before but did not bring them up and no longer complains of spitting out blood since last week  No current suicidal homicidal thoughts intent or plans reported  No phobias obsessions or compulsions reported  Still accusatory  to certain staff and not too fixated on multiple physical complaints today  Perceptual Disturbances: None and does not appear responding to internal stimuli at times   Risk Potential: Tendency to not care for herself    Sensorium:  person, place, time/date, situation, day of week, month of year and time   Cognition:  grossly intact with no deficits in recent or remote memory or immediate recall   Consciousness:  alert and awake    Attention: Intact concentration and attention span   Intellect: Considered to be at least of average intelligence   Insight:  limited but improving   Judgment: improving      Motor Activity: no abnormal movements         Recent Labs:  Results Reviewed     None          I/O Past 24 hours:  No intake/output data recorded  No intake/output data recorded  Assessment / Plan:     Sch[facility]oaffective [facility], [facility]      Reason for continued inpatient care:  Because of underlying paranoia and inability to care for herself on her own and multiple somatic complaints  Acceptance by patient:  Accepting  Patsy Benavidez in recovery:  About living in same  personal care home once she feels better  Understanding of medications :  Has some understanding  Involved in reintegration process: On off unit privileges now  Trusting in relatoinship with psychiatrist:  Trusting    Recommended Treatment:    Medication changes:  1) none today  Non-pharmacological treatments  1) continue with  individual therapy group therapy, milieu therapy and occupational therapy and milieu therapy involving multidisciplinary team approach with psychotherapist, case management, nursing, peer support services, art therapist etc using recovery principles and psycho-education about accepting illness and need for treatment     2) encourage to cooperate with behavior plan  3) encourage to attend to ADLs like taking showers and wearing clean clothes   4) Encourage to attend groups   5) see how she does on off unit privileges  and expectations discussed with her and she did verbalize an understanding  Safety  1) Safety/communication plan established targeting dynamic risk factors above  Discharge Plan most likely back to the a:  Personal care boarding home with act services once her delusions are managed and mood becomes more stabilized and she attends to her ADLs she becomes more receptive to treatment compliance but she has not shown any improvement in the last 5 months since she has been on the unit and hence the referral needs to be initiated for the  [address] [place] [address] / Coordination of Care    Total floor / unit time spent today 15 minutes  Greater than 50% of total time was spent with the patient and / or family counseling and / or coordination of care  A description of the counseling / coordination of care  Patient's Rights, confidentiality and exceptions to confidentiality, use of automated medical record, [facility] staff access to medical record, and consent to treatment reviewed      Zac Sierra MD

## 2019-12-18 NOTE — PLAN OF CARE
Problem: Alteration in Thoughts and Perception  Goal: Treatment Goal: Gain control of psychotic behaviors/thinking, reduce/eliminate presenting symptoms and demonstrate improved reality functioning upon discharge  Outcome: Progressing  Goal: Verbalize thoughts and feelings  Description  Interventions:  - Promote a nonjudgmental and trusting relationship with the patient through active listening and therapeutic communication  - Assess patient's level of functioning, behavior and potential for risk  - Engage patient in 1 on 1 interactions for a minimum of 15 minutes each session  - Encourage patient to express fears, feelings, frustrations, and discuss symptoms    - [place] patient to reality, help patient recognize reality-based thinking   - Administer medications as ordered and assess for potential side effects  - Provide the patient education related to the signs and symptoms of the illness and desired effects of prescribed medications  Outcome: Progressing  Goal: Agree to be compliant with medication regime, as prescribed and report medication side effects  Description  Interventions:  - Offer appropriate PRN medication and supervise ingestion; conduct aims, as needed   Outcome: Progressing  Goal: Attend and participate in unit activities, including therapeutic, recreational, and educational groups  Description  Interventions:  - Provide therapeutic and educational activities daily, encourage attendance and participation, and document same in the medical record     CERTIFIED PEER SPECIALIST INTERVENTIONS:    Complete peer assessment with patient to assess their needs and identify their goals to complete while in the recovery program as well as once discharged into the community  Patient will complete WRAP Plan, Crisis Plan and 5 Life Domains  Patient will attend 50% of groups offered on the unit  Patient will complete a goal card weekly      Outcome: Progressing     Problem: Ineffective Coping  Goal: Participates in unit activities  Description  Interventions:  - Provide therapeutic environment   - Provide required programming   - Redirect inappropriate behaviors   Outcome: Progressing  Goal: Patient/Family verbalizes awareness of resources  Outcome: Progressing  Goal: Understands least restrictive measures  Description  Interventions:  - Utilize least restrictive behavior  Outcome: Progressing     Problem: Risk for Self Injury/Neglect  Goal: Treatment Goal: Remain safe during length of stay, learn and adopt new coping skills, and be free of self-injurious ideation, impulses and acts at the time of discharge  Outcome: Progressing  Goal: Verbalize thoughts and feelings  Description  Interventions:  - Assess and re-assess patient's lethality and potential for self-injury  - Engage patient in 1:1 interactions, daily, for a minimum of 15 minutes  - Encourage patient to express feelings, fears, frustrations, hopes  - Establish rapport/trust with patient   Outcome: Progressing  Goal: Refrain from harming self  Description  Interventions:  - Monitor patient closely, per order  - Develop a trusting relationship  - Supervise medication ingestion, monitor effects and side effects   Outcome: Progressing  Goal: Attend and participate in unit activities, including therapeutic, recreational, and educational groups  Description  Interventions:  - Provide therapeutic and educational activities daily, encourage attendance and participation, and document same in the medical record  - Obtain collateral information, encourage visitation and family involvement in care   Outcome: Progressing     Problem: Depression  Goal: Treatment Goal: Demonstrate behavioral control of depressive symptoms, verbalize feelings of improved mood/affect, and adopt new coping skills prior to discharge  Outcome: Progressing  Goal: Verbalize thoughts and feelings  Description  Interventions:  - Assess and re-assess patient's level of risk   - Engage patient in 1:1 interactions, daily, for a minimum of 15 minutes   - Encourage patient to express feelings, fears, frustrations, hopes   Outcome: Progressing  Goal: Refrain from isolation  Description  Interventions:  - Develop a trusting relationship   - Encourage socialization   Outcome: Progressing     Problem: Anxiety  Goal: Anxiety is at manageable level  Description  Interventions:  - Assess and monitor patient's anxiety level  - Monitor for signs and symptoms of anxiety both physical and emotional (heart palpitations, chest pain, shortness of breath, headaches, nausea, feeling jumpy, restlessness, irritable, apprehensive)  - Collaborate with interdisciplinary team and initiate plan and interventions as ordered    - [place] patient to unit/surroundings  - Explain treatment plan  - Encourage participation in care  - Encourage verbalization of concerns/fears  - Identify coping mechanisms  - Assist in developing anxiety-reducing skills  - Administer/offer alternative therapies  - Limit or eliminate stimulants  Outcome: Progressing     Problem: Alteration in Orientation  Goal: Interact with staff daily  Description  Interventions:  - Assess and re-assess patient's level of orientation  - Engage patient in 1 on 1 interactions, daily, for a minimum of 15 minutes   - Establish rapport/trust with patient   Outcome: Progressing     Problem: PAIN - ADULT  Goal: Verbalizes/displays adequate comfort level or baseline comfort level  Description  Interventions:  - Encourage patient to monitor pain and request assistance  - Assess pain using appropriate pain scale  - Administer analgesics based on type and severity of pain and evaluate response  - Implement non-pharmacological measures as appropriate and evaluate response  - Consider cultural and social influences on pain and pain management  - Notify physician/advanced practitioner if interventions unsuccessful or patient reports new pain  Outcome: Progressing     Problem: SAFETY ADULT  Goal: Patient will remain free of falls  Description  INTERVENTIONS:  - Assess patient frequently for physical needs  -  Identify cognitive and physical deficits and behaviors that affect risk of falls  -  [place] fall precautions as indicated by assessment   - Educate patient/family on patient safety including physical limitations  - Instruct patient to call for assistance with activity based on assessment  - Modify environment to reduce risk of injury  - Consider OT/PT consult to assist with strengthening/mobility  Outcome: Progressing     Problem: Nutrition/Hydration-ADULT  Goal: Nutrient/Hydration intake appropriate for improving, restoring or maintaining nutritional needs  Description  Monitor and assess patient's nutrition/hydration status for malnutrition  Collaborate with interdisciplinary team and initiate plan and interventions as ordered  Monitor patient's weight and dietary intake as ordered or per policy  Utilize nutrition screening tool and intervene as necessary  Determine patient's food preferences and provide high-protein, high-caloric foods as appropriate       INTERVENTIONS:  - Monitor oral intake, urinary output, labs, and treatment plans  - Assess nutrition and hydration status and recommend course of action  - Evaluate amount of meals eaten  - Assist patient with eating if necessary   - Allow adequate time for meals  - Recommend/ encourage appropriate diets, oral nutritional supplements, and vitamin/mineral supplements  - Order, calculate, and assess calorie counts as needed  - Recommend, monitor, and adjust tube feedings and TPN/PPN based on assessed needs  - Assess need for intravenous fluids  - Provide specific nutrition/hydration education as appropriate  - Include patient/family/caregiver in decisions related to nutrition  Outcome: Not Progressing    Visible intermittently, mostly isolative to her bed, non compliant with regularly being out of bed or using incentive spirometer  Attended [address] group  Did not shower or do hygiene  No somatic complaints voiced  Continues to eat poorly: ate 25% of breakfast - yogurt and oatmeal; ate 25% of lunch: ate the soup, rice pudding and 2 french fries, didn't eat the burger the rest of the french fries  No other behaviors or issues noted  Med compliant  Continue to monitor

## 2019-12-18 NOTE — PROGRESS NOTES
12/18/19 0900   Team Meeting   Meeting Type Daily Rounds   Team Members Present   Team Members Present Physician;Nurse;; Other (Discipline and Name)   Physician Team Member Dr Birgit Castro Team Member Desmond Dawn RN   Care Management Team Member Brenda Green   Other (Discipline and Name) Jasvir Hopkins LCSW; SHANIKA Sandoval     Patient attended groups and was more social and visible on the unit yesterday  Reports she is working towards earning a pass for [employer] or [address]  Slept

## 2019-12-18 NOTE — PLAN OF CARE
Problem: Alteration in Thoughts and Perception  Goal: Verbalize thoughts and feelings  Description  Interventions:  - Promote a nonjudgmental and trusting relationship with the patient through active listening and therapeutic communication  - Assess patient's level of functioning, behavior and potential for risk  - Engage patient in 1 on 1 interactions for a minimum of 15 minutes each session  - Encourage patient to express fears, feelings, frustrations, and discuss symptoms    - [place] patient to reality, help patient recognize reality-based thinking   - Administer medications as ordered and assess for potential side effects  - Provide the patient education related to the signs and symptoms of the illness and desired effects of prescribed medications  Outcome: Progressing  Goal: Agree to be compliant with medication regime, as prescribed and report medication side effects  Description  Interventions:  - Offer appropriate PRN medication and supervise ingestion; conduct aims, as needed   Outcome: Progressing  Goal: Attend and participate in unit activities, including therapeutic, recreational, and educational groups  Description  Interventions:  - Provide therapeutic and educational activities daily, encourage attendance and participation, and document same in the medical record     CERTIFIED PEER SPECIALIST INTERVENTIONS:    Complete peer assessment with patient to assess their needs and identify their goals to complete while in the recovery program as well as once discharged into the community  Patient will complete WRAP Plan, Crisis Plan and 5 Life Domains  Patient will attend 50% of groups offered on the unit  Patient will complete a goal card weekly      Outcome: Progressing     Problem: Depression  Goal: Refrain from isolation  Description  Interventions:  - Develop a trusting relationship   - Encourage socialization   Outcome: Progressing     Problem: Nutrition/Hydration-ADULT  Goal: Nutrient/Hydration intake appropriate for improving, restoring or maintaining nutritional needs  Description  Monitor and assess patient's nutrition/hydration status for malnutrition  Collaborate with interdisciplinary team and initiate plan and interventions as ordered  Monitor patient's weight and dietary intake as ordered or per policy  Utilize nutrition screening tool and intervene as necessary  Determine patient's food preferences and provide high-protein, high-caloric foods as appropriate  INTERVENTIONS:  - Monitor oral intake, urinary output, labs, and treatment plans  - Assess nutrition and hydration status and recommend course of action  - Evaluate amount of meals eaten  - Assist patient with eating if necessary   - Allow adequate time for meals  - Recommend/ encourage appropriate diets, oral nutritional supplements, and vitamin/mineral supplements  - Order, calculate, and assess calorie counts as needed  - Recommend, monitor, and adjust tube feedings and TPN/PPN based on assessed needs  - Assess need for intravenous fluids  - Provide specific nutrition/hydration education as appropriate  - Include patient/family/caregiver in decisions related to nutrition  Outcome: Progressing     2008 [place] Bennett has been visible on unit for long intervals, seated in dining room or on phone chair in [place] when it is not in use  She is verbal of intention to earn the New Year's pass to spend time w/sister, enjoy some of holiday beauty  Did well w/meal, eating portion of macaroni & cheese, all egg salad, & coffee  Came up for her supper medicine  She is pleasant, social w/staff & select peers  Taking part now in PM Group

## 2019-12-18 NOTE — PLAN OF CARE
Problem: PAIN - ADULT  Goal: Verbalizes/displays adequate comfort level or baseline comfort level  Description  Interventions:  - Encourage patient to monitor pain and request assistance  - Assess pain using appropriate pain scale  - Administer analgesics based on type and severity of pain and evaluate response  - Implement non-pharmacological measures as appropriate and evaluate response  - Consider cultural and social influences on pain and pain management  - Notify physician/advanced practitioner if interventions unsuccessful or patient reports new pain  Outcome: Progressing     Problem: SAFETY ADULT  Goal: Patient will remain free of falls  Description  INTERVENTIONS:  - Assess patient frequently for physical needs  -  Identify cognitive and physical deficits and behaviors that affect risk of falls    -  [place] fall precautions as indicated by assessment   - Educate patient/family on patient safety including physical limitations  - Instruct patient to call for assistance with activity based on assessment  - Modify environment to reduce risk of injury  - Consider OT/PT consult to assist with strengthening/mobility  Outcome: Progressing     Problem: RESPIRATORY - ADULT  Goal: Achieves optimal ventilation and oxygenation  Description  INTERVENTIONS:  - Assess for changes in respiratory status  - Assess for changes in mentation and behavior  - Position to facilitate oxygenation and minimize respiratory effort  - Oxygen administration by appropriate delivery method based on oxygen saturation (per order) or ABGs  - Initiate smoking cessation education as indicated  - Encourage broncho-pulmonary hygiene including cough, deep breathe, Incentive Spirometry  - Assess the need for suctioning and aspirate as needed  - Assess and instruct to report SOB or any respiratory difficulty  - Respiratory Therapy support as indicated  Outcome: Progressing     Problem: SLEEP DISTURBANCE  Goal: Will exhibit normal sleeping pattern  Description  Interventions:  -  Assess the patients sleep pattern, noting recent changes  - Administer medication as ordered  - Decrease environmental stimuli, including noise, as appropriate during the night  - Encourage the patient to actively participate in unit groups and or exercise during the day to enhance ability to achieve adequate sleep at night  - Assess the patient, in the morning, encouraging a description of sleep experience  Outcome: Progressing Lei Fothergill in bed with eyes closed, breath even and unlabored   On O2 with humidifier @1L/m via nasal cannula   Q 15 minutes rounding   Maintained on ongoing fall and SAFE precaution   No somatic complaint overnight   No respiratory distress   Will continue to monitor

## 2019-12-18 NOTE — ASSESSMENT & PLAN NOTE
Psychiatry Progress Note  Patient reports that she  wet her hair today and plans to do that tonight and has been now attending more of the groups including IMR groups as well  She is still  reluctant to have the surgery now and wants to wait it out until she gets out as outpatient only  Compliant with medications so far including the clozapine and reports no signs or sxs of agranulocytosis or myocarditis or endocarditis and she is moving her bowels so far with no issues   She did not verbalize any  psychosomatic complaints today as before like dying from some terrible illness, having low blood sugar, inability to swallow or breathe etc  She has been sleeping well and has been still using the oxygen concentrator at night and reports that she is happy to hear that she can go back to the [facility] home she came from if she keeps up with her improvement rather than be transferred to the 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 but periodically gets suspicious about certain staff tampering with her meds, the spirometer or her oxygen concentrator but did not talk about it today and was pleasant and friendly when approached today      Current medications:    Current Facility-Administered Medications:     acetaminophen (TYLENOL) tablet 650 mg, 650 mg, Oral, Q6H PRN, Mynor Terry MD    albuterol (PROVENTIL HFA,VENTOLIN HFA) inhaler 2 puff, 2 puff, Inhalation, Q4H PRN, Mynor Terry MD, 2 puff at 10/11/19 0424    aluminum-magnesium hydroxide-simethicone (MYLANTA) 200-200-20 mg/5 mL oral suspension 15 mL, 15 mL, Oral, Q4H PRN, Mynor Terry MD    ammonium lactate (LAC-HYDRIN) 12 % lotion 1 application, 1 application, Topical, BID PRN, Mynor Terry MD    benzonatate (TESSALON PERLES) capsule 100 mg, 100 mg, Oral, TID PRN, Mynor Terry MD    benztropine (COGENTIN) injection 1 mg, 1 mg, Intramuscular, Q8H PRN, Jill Gayle MD    carbamide peroxide (DEBROX) 6 5 % otic solution 5 drop, 5 drop, Left Ear, BID, Rona Mcintosh MD, 5 drop at 12/18/19 0829    cloZAPine (CLOZARIL) tablet 25 mg, 25 mg, Oral, BID, Jill Gayle MD, 25 mg at 12/17/19 2151    cloZAPine (CLOZARIL) tablet 50 mg, 50 mg, Oral, BID, Jill Gayle MD, 50 mg at 12/17/19 2152    EPINEPHrine PF (ADRENALIN) 1 mg/mL injection 0 15 mg, 0 15 mg, Intramuscular, Once PRN, Jill Gayle MD    fluticasone-vilanterol (BREO ELLIPTA) 200-25 MCG/INH inhaler 1 puff, 1 puff, Inhalation, Daily, Jill Gayle MD, 1 puff at 12/18/19 0824    ketotifen (ZADITOR) 0 025 % ophthalmic solution 1 drop, 1 drop, Right Eye, BID PRN, Jill Gayle MD    levothyroxine tablet 125 mcg, 125 mcg, Oral, Early Morning, Jill Gayle MD, 125 mcg at 12/18/19 0636    magnesium hydroxide (MILK OF MAGNESIA) 400 mg/5 mL oral suspension 30 mL, 30 mL, Oral, Daily PRN, Jill Gayle MD    montelukast (SINGULAIR) tablet 10 mg, 10 mg, Oral, HS, Jill Gayle MD, 10 mg at 12/15/19 2107    OLANZapine (ZyPREXA) IM injection 5 mg, 5 mg, Intramuscular, Q8H PRN, Jill Gayle MD    OLANZapine (ZyPREXA) tablet 5 mg, 5 mg, Oral, Q8H PRN, Jill Gayle MD    ondansetron (ZOFRAN-ODT) dispersible tablet 4 mg, 4 mg, Oral, Q6H PRN, Jill Gayle MD, 4 mg at 12/09/19 1757    pantoprazole (PROTONIX) EC tablet 40 mg, 40 mg, Oral, Early Morning, Jill Gayle MD, 40 mg at 12/18/19 0636    polyethylene glycol (MIRALAX) packet 17 g, 17 g, Oral, Daily PRN, Jill Gayle MD    polyvinyl alcohol (LIQUIFILM TEARS) 1 4 % ophthalmic solution 1 drop, 1 drop, Both Eyes, Q3H PRN, Jill Gayle MD    sertraline (ZOLOFT) tablet 50 mg, 50 mg, Oral, BID, Jill Gayle MD, 50 mg at 12/18/19 0825    sucralfate (CARAFATE) oral suspension 1,000 mg, 1,000 mg, Oral, BID, Cat Torres MD, 1,000 mg at 12/18/19 0636    theophylline (JEF-24) 24 hr capsule 200 mg, 200 mg, Oral, Daily, Scott Dirk Carey MD, 200 mg at 12/18/19 0825    tiotropium [facility]) capsule for inhaler 18 mcg, 18 mcg, Inhalation, Daily, Zac Sierra MD, 18 mcg at 12/18/19 0824    traZODone (DESYREL) tablet 25 mg, 25 mg, Oral, HS PRN, Zac Sierra MD  Justification if on more than two antipsychotics:  Only on his clozapine  Side effects if any:  None    Risks , benefits, side effects and precautions of medications discussed with patient and reminded patient to let us know any problems with medications     Objective:     Vital Signs:  Vitals:    12/17/19 0700 12/17/19 1603 12/17/19 2000 12/18/19 0730   BP: 118/67 106/68 96/54 120/63   BP Location: Right arm Right arm Left arm Left arm   Pulse: 86 93 69 86   Resp: 18 14 16 16   Temp: 97 7 °F (36 5 °C) (!) 97 4 °F (36 3 °C) 98 5 °F (36 9 °C) 98 3 °F (36 8 °C)   TempSrc:  Temporal Temporal Temporal   SpO2:  91% 91%    Weight:       Height:         Appearance:  age appropriate, casually dressed and overweight older than stated age, friendly polite and pleasant  with hair uncombed but better groomed    Behavior:  evasive and guarded but less suspicious and still preoccupied with no psychosomatic complaints today    Speech:  normal pitch and normal volume but circumstantial and tangential with stilted speech    Mood:  anxious and dysthymic   Affect:  mood-congruent, elated and entitled anxious and irritated and sad at times but pleasant today    Thought Process:  goal directed and illogical slightly pressured and tangential and talks as if in a court of law   Thought Content:   Continues to have fixated somatic beliefs as before but did not bring them up and no longer complains of spitting out blood since last week  No current suicidal homicidal thoughts intent or plans reported  No phobias obsessions or compulsions reported  Still accusatory  to certain staff and not too fixated on multiple physical complaints today      Perceptual Disturbances: None and does not appear responding to internal stimuli at times   Risk Potential: Tendency to not care for herself    Sensorium:  person, place, time/date, situation, day of week, month of year and time   Cognition:  grossly intact with no deficits in recent or remote memory or immediate recall   Consciousness:  alert and awake    Attention: Intact concentration and attention span   Intellect: Considered to be at least of average intelligence   Insight:  limited but improving   Judgment: improving      Motor Activity: no abnormal movements         Recent Labs:  Results Reviewed     None          I/O Past 24 hours:  No intake/output data recorded  No intake/output data recorded  Assessment / Plan:     Schizoaffective disorder, bipolar [facility] ([facility] )      Reason for continued inpatient care:  Because of underlying paranoia and inability to care for herself on her own and multiple somatic complaints  Acceptance by patient:  Accepting  Kitty Sicard in recovery:  About living in same  personal care home once she feels better  Understanding of medications :  Has some understanding  Involved in reintegration process: On off unit privileges now  Trusting in relatoinship with psychiatrist:  Trusting    Recommended Treatment:    Medication changes:  1) none today  Non-pharmacological treatments  1) continue with  individual therapy group therapy, milieu therapy and occupational therapy and milieu therapy involving multidisciplinary team approach with psychotherapist, case management, nursing, peer support services, art therapist etc using recovery principles and psycho-education about accepting illness and need for treatment     2) encourage to cooperate with behavior plan  3) encourage to attend to ADLs like taking showers and wearing clean clothes   4) Encourage to attend groups   5) see how she does on off unit privileges  and expectations discussed with her and she did verbalize an understanding  Safety  1) Safety/communication plan established targeting dynamic risk factors above  Discharge Plan most likely back to the a:  Personal care boarding home with act services once her delusions are managed and mood becomes more stabilized and she attends to her ADLs she becomes more receptive to treatment compliance but she has not shown any improvement in the last 5 months since she has been on the unit and hence the referral needs to be initiated for the  [address] [place] [address] / Coordination of Care    Total floor / unit time spent today 15 minutes  Greater than 50% of total time was spent with the patient and / or family counseling and / or coordination of care  A description of the counseling / coordination of care  Patient's Rights, confidentiality and exceptions to confidentiality, use of automated medical record, [facility] staff access to medical record, and consent to treatment reviewed      Greer Beltran MD

## 2019-12-19 NOTE — PROGRESS NOTES
Patient participated in Guided Meditation      12/18/19 1100   Activity/Group Checklist   Group   ([facility] Guided Meditation )   Attendance Attended   Attendance Duration (min) 46-60   Interactions Interacted appropriately   Affect/Mood Appropriate;Normal range;Bright   Goals Achieved Identified feelings; Able to listen to others; Able to engage in interactions; Able to self-disclose

## 2019-12-19 NOTE — PLAN OF CARE
Problem: PAIN - ADULT  Goal: Verbalizes/displays adequate comfort level or baseline comfort level  Description  Interventions:  - Encourage patient to monitor pain and request assistance  - Assess pain using appropriate pain scale  - Administer analgesics based on type and severity of pain and evaluate response  - Implement non-pharmacological measures as appropriate and evaluate response  - Consider cultural and social influences on pain and pain management  - Notify physician/advanced practitioner if interventions unsuccessful or patient reports new pain  Outcome: Progressing     Problem: SAFETY ADULT  Goal: Patient will remain free of falls  Description  INTERVENTIONS:  - Assess patient frequently for physical needs  -  Identify cognitive and physical deficits and behaviors that affect risk of falls    -  [place] fall precautions as indicated by assessment   - Educate patient/family on patient safety including physical limitations  - Instruct patient to call for assistance with activity based on assessment  - Modify environment to reduce risk of injury  - Consider OT/PT consult to assist with strengthening/mobility  Outcome: Progressing     Problem: RESPIRATORY - ADULT  Goal: Achieves optimal ventilation and oxygenation  Description  INTERVENTIONS:  - Assess for changes in respiratory status  - Assess for changes in mentation and behavior  - Position to facilitate oxygenation and minimize respiratory effort  - Oxygen administration by appropriate delivery method based on oxygen saturation (per order) or ABGs  - Initiate smoking cessation education as indicated  - Encourage broncho-pulmonary hygiene including cough, deep breathe, Incentive Spirometry  - Assess the need for suctioning and aspirate as needed  - Assess and instruct to report SOB or any respiratory difficulty  - Respiratory Therapy support as indicated  Outcome: Progressing     Problem: SLEEP DISTURBANCE  Goal: Will exhibit normal sleeping pattern  Description  Interventions:  -  Assess the patients sleep pattern, noting recent changes  - Administer medication as ordered  - Decrease environmental stimuli, including noise, as appropriate during the night  - Encourage the patient to actively participate in unit groups and or exercise during the day to enhance ability to achieve adequate sleep at night  - Assess the patient, in the morning, encouraging a description of sleep experience  Outcome: Andre Elvira maintained on ongoing fall and SAFE precaution   Laying in bed with eyes closed, breath even and unlabored   On O2 with humidifier @1L/m via nasal cannula   Q 15 minutes rounding  No somatic complaint overnight  No PRN needed for sleep aid  No indication of pain or discomfort  No respiratory distress  Romayne Carrion continue to monitor

## 2019-12-19 NOTE — PLAN OF CARE
Problem: Alteration in Thoughts and Perception  Goal: Agree to be compliant with medication regime, as prescribed and report medication side effects  Description  Interventions:  - Offer appropriate PRN medication and supervise ingestion; conduct aims, as needed   Outcome: Progressing  Goal: Attend and participate in unit activities, including therapeutic, recreational, and educational groups  Description  Interventions:  - Provide therapeutic and educational activities daily, encourage attendance and participation, and document same in the medical record     CERTIFIED PEER SPECIALIST INTERVENTIONS:    Complete peer assessment with patient to assess their needs and identify their goals to complete while in the recovery program as well as once discharged into the community  Patient will complete WRAP Plan, Crisis Plan and 5 Life Domains  Patient will attend 50% of groups offered on the unit  Patient will complete a goal card weekly  Outcome: Progressing  Goal: Complete daily ADLs, including personal hygiene independently, as able  Description  Interventions:  - Observe, teach, and assist patient with ADLS  - Monitor and promote a balance of rest/activity, with adequate nutrition and elimination   Outcome: Progressing     Problem: Depression  Goal: Refrain from isolation  Description  Interventions:  - Develop a trusting relationship   - Encourage socialization   Outcome: Progressing    [address] has been more visible; sits out in dining room or @ phone chair in [place] when it is not in use  She is pleasant, social w/staff, some select peer interactions  Was willing to do complete shower after setup & w/assistance for hair care  Felt pleasingly"relaxed" after her efforts  For meal ate all egg salad, coffee, Ensure; for HS snack had 4 cookies, potato chips, 12oz milk  Was medicine compliant except the Singulair  Took part in PM Group   Used the Panraven, but, only achieved volumes of 1000ml tonight  Wearing now her QHS humidified nasal O2 @ 1L for bed

## 2019-12-19 NOTE — PROGRESS NOTES
Progress Note - Annamarie Eng 1955, 60 y o  female MRN: [id number]    [facility] Encounter: [id number]    Primary Care Provider: Naila Khalil PA-C   Date and time admitted to hospital: 7/23/2019  5:30 PM        * Schizoaffective disorder, bipolar type [facility])  Assessment & Plan  Psychiatry Progress Note  Patient did take a shower and even with her hair last night proudly tells me so  He is still reluctant to go to the [place] next door which is only adjacent to the [facility] unit as she is afraid that she may need portable oxygen but I asked her to give it a try and she stated she will  She did not verbalize any  psychosomatic complaints today as before like dying from some terrible illness, having low blood sugar, inability to swallow or breathe etc   But it appears that she is still harboring some somatic beliefs  She has been sleeping well and has been still using the oxygen concentrator at night and reports that she is happy to hear that she can go back to the [facility] home she came from if she keeps up with her improvement rather than be transferred to the [facility] and she states that is why she is going to groups and attending to her ADLs with showers twice a week  She is now wanting to go on a pass with her sister as well for new year and I did tell her that it is quite possible with the current rate of improvement    She no longer voices any delusions that there may be a micro chip on 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 but periodically gets suspicious about certain staff tampering with her meds, the spirometer or her oxygen concentrator and was again found laying on bed but did wake up and says sit down and talk with me    Current medications:    Current Facility-Administered Medications:     acetaminophen (TYLENOL) tablet 650 mg, 650 mg, Oral, Q6H PRN, Shi Pham MD    albuterol (PROVENTIL HFA,VENTOLIN HFA) inhaler 2 puff, 2 puff, Inhalation, Q4H PRN, Shi Pham MD, 2 puff at 10/11/19 0424    aluminum-magnesium hydroxide-simethicone (MYLANTA) 200-200-20 mg/5 mL oral suspension 15 mL, 15 mL, Oral, Q4H PRN, Teri Huggins MD    ammonium lactate (LAC-HYDRIN) 12 % lotion 1 application, 1 application, Topical, BID PRN, Teri Huggins MD    benzonatate (TESSALON PERLES) capsule 100 mg, 100 mg, Oral, TID PRN, Teri Huggins MD    benztropine (COGENTIN) injection 1 mg, 1 mg, Intramuscular, Q8H PRN, Teri Huggins MD    carbamide peroxide (DEBROX) 6 5 % otic solution 5 drop, 5 drop, Left Ear, BID, Rona Guillen MD, 5 drop at 12/18/19 2143    cloZAPine (CLOZARIL) tablet 25 mg, 25 mg, Oral, BID, Teri Huggins MD, 25 mg at 12/18/19 2143    cloZAPine (CLOZARIL) tablet 50 mg, 50 mg, Oral, BID, Teri Huggins MD, 50 mg at 12/18/19 2143    EPINEPHrine PF (ADRENALIN) 1 mg/mL injection 0 15 mg, 0 15 mg, Intramuscular, Once PRN, Teri Huggins MD    fluticasone-vilanterol (BREO ELLIPTA) 200-25 MCG/INH inhaler 1 puff, 1 puff, Inhalation, Daily, Teri Huggins MD, 1 puff at 12/19/19 0836    ketotifen (ZADITOR) 0 025 % ophthalmic solution 1 drop, 1 drop, Right Eye, BID PRN, Teri Huggins MD    levothyroxine tablet 125 mcg, 125 mcg, Oral, Early Morning, Teri Huggins MD, 125 mcg at 12/19/19 0603    magnesium hydroxide (MILK OF MAGNESIA) 400 mg/5 mL oral suspension 30 mL, 30 mL, Oral, Daily PRN, Teri Huggins MD    montelukast (SINGULAIR) tablet 10 mg, 10 mg, Oral, HS, Teri Huggins MD, 10 mg at 12/15/19 2107    OLANZapine (ZyPREXA) IM injection 5 mg, 5 mg, Intramuscular, Q8H PRN, Teri Huggins MD    OLANZapine (ZyPREXA) tablet 5 mg, 5 mg, Oral, Q8H PRN, Teri Huggins MD    ondansetron (ZOFRAN-ODT) dispersible tablet 4 mg, 4 mg, Oral, Q6H PRN, Teri Huggins MD, 4 mg at 12/09/19 1757    pantoprazole (PROTONIX) EC tablet 40 mg, 40 mg, Oral, Early Morning, Teri Huggins MD, 40 mg at 12/19/19 0603    polyethylene glycol (MIRALAX) packet 17 g, 17 g, Oral, Daily PRN, Teri Huggins MD    polyvinyl alcohol (LIQUIFILM TEARS) 1 4 % ophthalmic solution 1 drop, 1 drop, Both Eyes, Q3H PRN, Greer Beltran MD    sertraline (ZOLOFT) tablet 50 mg, 50 mg, Oral, BID, Greer Beltran MD, 50 mg at 12/19/19 0836    sucralfate (CARAFATE) oral suspension 1,000 mg, 1,000 mg, Oral, BID, Kiya Neves MD, 1,000 mg at 12/19/19 0603    theophylline (JEF-24) 24 hr capsule 200 mg, 200 mg, Oral, Daily, Greer Beltran MD, 200 mg at 12/19/19 0836    tiotropium (SPIRIVA) capsule for inhaler 18 mcg, 18 mcg, Inhalation, Daily, Greer Beltran MD, 18 mcg at 12/19/19 0839    traZODone (DESYREL) tablet 25 mg, 25 mg, Oral, HS PRN, Greer Beltran MD  Justification if on more than two antipsychotics:  Only on his clozapine  Side effects if any:  None    Risks , benefits, side effects and precautions of medications discussed with patient and reminded patient to let us know any problems with medications     Objective:     Vital Signs:  Vitals:    12/18/19 1641 12/18/19 1928 12/19/19 0730 12/19/19 0732   BP: 100/63 113/51 116/67 (!) 88/65   BP Location: Left arm Left arm Left arm Left arm   Pulse: 76 74 84 72   Resp: 18 16 16    Temp: 97 6 °F (36 4 °C) (!) 97 °F (36 1 °C) 98 2 °F (36 8 °C)    TempSrc: Temporal Temporal Temporal    SpO2: 96% 90%     Weight:       Height:         Appearance:  age appropriate, casually dressed and overweight older than stated age, friendly polite and pleasant  with hair combed having taken a shower yesterday   Behavior:  evasive and guarded but less suspicious and still preoccupied with no psychosomatic complaints verbalized today   Speech:  normal pitch and normal volume but circumstantial and tangential with stilted speech    Mood:  anxious and dysthymic   Affect:  mood-congruent, elated and entitled anxious and irritated and sad at times but pleasant today    Thought Process:  goal directed and illogical slightly pressured and tangential and talks as if in a court of law   Thought Content:   Continues to have fixated somatic beliefs as before No current suicidal homicidal thoughts intent or plans reported  No phobias obsessions or compulsions reported  Still accusatory  to certain staff and not too fixated on multiple physical complaints today  She states she would like to go to the [place] and I encouraged her to do so by keeping up with the behavior plan   Perceptual Disturbances: None and does not appear responding to internal stimuli at times   Risk Potential: Tendency to not care for herself    Sensorium:  person, place, time/date, situation, day of week, month of year and time   Cognition:  grossly intact with no deficits in recent or remote memory or immediate recall   Consciousness:  alert and awake    Attention: Intact concentration and attention span   Intellect: Considered to be at least of average intelligence   Insight:  limited but improving   Judgment: improving      Motor Activity: no abnormal movements         Recent Labs:  Results Reviewed     None          I/O Past 24 hours:  No intake/output data recorded  No intake/output data recorded  Assessment / Plan:     Schizoaffective disorder, bipolar type ([facility] [address] )      Reason for continued inpatient care:  Because of underlying paranoia and inability to care for herself on her own and multiple somatic complaints  Acceptance by patient:  Accepting  Juan Lisa in recovery:  About living in same  personal care home once she feels better  Understanding of medications :  Has some understanding  Involved in reintegration process:   On off unit privileges now  Trusting in relatoinship with psychiatrist:  Trusting    Recommended Treatment:    Medication changes:  1) none today  Non-pharmacological treatments  1) continue with  individual therapy group therapy, milieu therapy and occupational therapy and milieu therapy involving multidisciplinary team approach with psychotherapist, case management, nursing, peer support services, art therapist etc using recovery principles and psycho-education about accepting illness and need for treatment   2) encourage to cooperate with behavior plan  3) encourage to attend to ADLs like taking showers and wearing clean clothes   4) Encourage to attend groups   5) see how she does on off unit privileges  and expectations discussed with her and she did verbalize an understanding  Safety  1) Safety/communication plan established targeting dynamic risk factors above  Discharge Plan most likely back to the a:  Personal care boarding home with act services once her delusions are managed and mood becomes more stabilized and she attends to her ADLs she becomes more receptive to treatment compliance but [facility] referral is still on to see if that would deter her from refusing ADLs and refusing cooperation with the recovery program on the [facility] unit    Counseling / Coordination of Care    Total floor / unit time spent today 15 minutes  Greater than 50% of total time was spent with the patient and / or family counseling and / or coordination of care  A description of the counseling / coordination of care  Patient's Rights, confidentiality and exceptions to confidentiality, use of automated medical record, [facility] staff access to medical record, and consent to treatment reviewed      Sandhya Bolaños MD

## 2019-12-19 NOTE — PROGRESS NOTES
Patient not scheduled to attend      12/18/19 1130   Activity/Group Checklist   Group Other (Comment)  ([facility] )   Attendance Did not attend   Attendance Duration (min) Greater than 60   Affect/Mood IRMA

## 2019-12-19 NOTE — PROGRESS NOTES
12/19/19 0900   Team Meeting   Meeting Type Daily Rounds   Team Members Present   Team Members Present Physician;Nurse;; Other (Discipline and Name)   Physician Team Member Dr Beth Kang RN   Care Management Team Member Brenda Rodriguez   Other (Discipline and Name) Julia Duran LCSW     Patient showered and washed her hair yesterday  Attended [facility] and 3-11 groups  Slept

## 2019-12-19 NOTE — PROGRESS NOTES
Not scheduled to attend      12/18/19 1400   Activity/Group Checklist   Group   ([facility] )   Attendance Did not attend   Attendance Duration (min) 46-60   Affect/Mood IRMA

## 2019-12-19 NOTE — PLAN OF CARE
Problem: Alteration in Thoughts and Perception  Goal: Agree to be compliant with medication regime, as prescribed and report medication side effects  Description  Interventions:  - Offer appropriate PRN medication and supervise ingestion; conduct aims, as needed   Outcome: Progressing  Goal: Attend and participate in unit activities, including therapeutic, recreational, and educational groups  Description  Interventions:  - Provide therapeutic and educational activities daily, encourage attendance and participation, and document same in the medical record     CERTIFIED PEER SPECIALIST INTERVENTIONS:    Complete peer assessment with patient to assess their needs and identify their goals to complete while in the recovery program as well as once discharged into the community  Patient will complete WRAP Plan, Crisis Plan and 5 Life Domains  Patient will attend 50% of groups offered on the unit  Patient will complete a goal card weekly      Outcome: Progressing     Problem: Ineffective Coping  Goal: Participates in unit activities  Description  Interventions:  - Provide therapeutic environment   - Provide required programming   - Redirect inappropriate behaviors   Outcome: Progressing  Goal: Understands least restrictive measures  Description  Interventions:  - Utilize least restrictive behavior  Outcome: Progressing     Problem: Risk for Self Injury/Neglect  Goal: Treatment Goal: Remain safe during length of stay, learn and adopt new coping skills, and be free of self-injurious ideation, impulses and acts at the time of discharge  Outcome: Progressing  Goal: Refrain from harming self  Description  Interventions:  - Monitor patient closely, per order  - Develop a trusting relationship  - Supervise medication ingestion, monitor effects and side effects   Outcome: Progressing  Goal: Attend and participate in unit activities, including therapeutic, recreational, and educational groups  Description  Interventions:  - Provide therapeutic and educational activities daily, encourage attendance and participation, and document same in the medical record  - Obtain collateral information, encourage visitation and family involvement in care   Outcome: Progressing     Problem: PAIN - ADULT  Goal: Verbalizes/displays adequate comfort level or baseline comfort level  Description  Interventions:  - Encourage patient to monitor pain and request assistance  - Assess pain using appropriate pain scale  - Administer analgesics based on type and severity of pain and evaluate response  - Implement non-pharmacological measures as appropriate and evaluate response  - Consider cultural and social influences on pain and pain management  - Notify physician/advanced practitioner if interventions unsuccessful or patient reports new pain  Outcome: Progressing     Problem: SAFETY ADULT  Goal: Patient will remain free of falls  Description  INTERVENTIONS:  - Assess patient frequently for physical needs  -  Identify cognitive and physical deficits and behaviors that affect risk of falls  -  [place] fall precautions as indicated by assessment   - Educate patient/family on patient safety including physical limitations  - Instruct patient to call for assistance with activity based on assessment  - Modify environment to reduce risk of injury  - Consider OT/PT consult to assist with strengthening/mobility  Outcome: Progressing     Problem: Nutrition/Hydration-ADULT  Goal: Nutrient/Hydration intake appropriate for improving, restoring or maintaining nutritional needs  Description  Monitor and assess patient's nutrition/hydration status for malnutrition  Collaborate with interdisciplinary team and initiate plan and interventions as ordered  Monitor patient's weight and dietary intake as ordered or per policy  Utilize nutrition screening tool and intervene as necessary   Determine patient's food preferences and provide high-protein, high-caloric foods as appropriate  INTERVENTIONS:  - Monitor oral intake, urinary output, labs, and treatment plans  - Assess nutrition and hydration status and recommend course of action  - Evaluate amount of meals eaten  - Assist patient with eating if necessary   - Allow adequate time for meals  - Recommend/ encourage appropriate diets, oral nutritional supplements, and vitamin/mineral supplements  - Order, calculate, and assess calorie counts as needed  - Recommend, monitor, and adjust tube feedings and TPN/PPN based on assessed needs  - Assess need for intravenous fluids  - Provide specific nutrition/hydration education as appropriate  - Include patient/family/caregiver in decisions related to nutrition  Outcome: Not Progressing   Visible intermittently, mostly isolative to her bed, non compliant with regularly being out of bed or using incentive spirometer  Attended [address] group  Did not shower or do hygiene but looks less disheveled as she showered and washed her hair on 3-11 shift yesterday  No somatic complaints voiced  Continues to eat poorly: ate 50% of breakfast - yogurt and oatmeal, didn't eat the eggs; ate 0% of lunch because she didn't like what she got  No other behaviors or issues noted  Med compliant  Continue to monitor

## 2019-12-19 NOTE — ASSESSMENT & PLAN NOTE
Psychiatry Progress Note  Patient did take a shower and even with her hair last night proudly tells me so  He is still reluctant to go to the [place] next door which is only adjacent to the [facility] unit as she is afraid that she may need portable oxygen but I asked her to give it a try and she stated she will  She did not verbalize any  psychosomatic complaints today as before like dying from some terrible illness, having low blood sugar, inability to swallow or breathe etc   But it appears that she is still harboring some somatic beliefs  She has been sleeping well and has been still using the oxygen concentrator at night and reports that she is happy to hear that she can go back to the [facility] home she came from if she keeps up with her improvement rather than be transferred to the [facility] and she states that is why she is going to groups and attending to her ADLs with showers twice a week  She is now wanting to go on a pass with her sister as well for new year and I did tell her that it is quite possible with the current rate of improvement    She no longer voices any delusions that there may be a micro chip on 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 but periodically gets suspicious about certain staff tampering with her meds, the spirometer or her oxygen concentrator and was again found laying on bed but did wake up and says sit down and talk with me    Current medications:    Current Facility-Administered Medications:     acetaminophen (TYLENOL) tablet 650 mg, 650 mg, Oral, Q6H PRN, Martha Wright MD    albuterol (PROVENTIL HFA,VENTOLIN HFA) inhaler 2 puff, 2 puff, Inhalation, Q4H PRN, Martha Wright MD, 2 puff at 10/11/19 0424    aluminum-magnesium hydroxide-simethicone (MYLANTA) 200-200-20 mg/5 mL oral suspension 15 mL, 15 mL, Oral, Q4H PRN, Martha Wright MD    ammonium lactate (LAC-HYDRIN) 12 % lotion 1 application, 1 application, Topical, BID PRN, Martha Wright MD    benzonatate (TESSALON PERLES) capsule 100 mg, 100 mg, Oral, TID PRN, Deedee Muir MD    benztropine (COGENTIN) injection 1 mg, 1 mg, Intramuscular, Q8H PRN, Deedee Muir MD    carbamide peroxide (DEBROX) 6 5 % otic solution 5 drop, 5 drop, Left Ear, BID, Rona Clinton MD, 5 drop at 12/18/19 2143    cloZAPine (CLOZARIL) tablet 25 mg, 25 mg, Oral, BID, Deedee Muir MD, 25 mg at 12/18/19 2143    cloZAPine (CLOZARIL) tablet 50 mg, 50 mg, Oral, BID, Deedee Muir MD, 50 mg at 12/18/19 2143    EPINEPHrine PF (ADRENALIN) 1 mg/mL injection 0 15 mg, 0 15 mg, Intramuscular, Once PRN, Deedee Muir MD    fluticasone-vilanterol (BREO ELLIPTA) 200-25 MCG/INH inhaler 1 puff, 1 puff, Inhalation, Daily, Deedee Muir MD, 1 puff at 12/19/19 0836    ketotifen (ZADITOR) 0 025 % ophthalmic solution 1 drop, 1 drop, Right Eye, BID PRN, Deedee Muir MD    levothyroxine tablet 125 mcg, 125 mcg, Oral, Early Morning, Deedee Muir MD, 125 mcg at 12/19/19 0603    magnesium hydroxide (MILK OF MAGNESIA) 400 mg/5 mL oral suspension 30 mL, 30 mL, Oral, Daily PRN, Deedee Muir MD    montelukast (SINGULAIR) tablet 10 mg, 10 mg, Oral, HS, Deedee Muir MD, 10 mg at 12/15/19 2107    OLANZapine (ZyPREXA) IM injection 5 mg, 5 mg, Intramuscular, Q8H PRN, Deedee Muir MD    OLANZapine (ZyPREXA) tablet 5 mg, 5 mg, Oral, Q8H PRN, Deedee Muir MD    ondansetron (ZOFRAN-ODT) dispersible tablet 4 mg, 4 mg, Oral, Q6H PRN, Deedee Muir MD, 4 mg at 12/09/19 1757    pantoprazole (PROTONIX) EC tablet 40 mg, 40 mg, Oral, Early Morning, Deedee Muir MD, 40 mg at 12/19/19 0603    polyethylene glycol (MIRALAX) packet 17 g, 17 g, Oral, Daily PRN, Deedee Muir MD    polyvinyl alcohol (LIQUIFILM TEARS) 1 4 % ophthalmic solution 1 drop, 1 drop, Both Eyes, Q3H PRN, Deedee Muir MD    sertraline (ZOLOFT) tablet 50 mg, 50 mg, Oral, BID, Deedee Muir MD, 50 mg at 12/19/19 0836    sucralfate (CARAFATE) oral suspension 1,000 mg, 1,000 mg, Oral, BID, Juan Carlos Merchant MD, 1,000 mg at 12/19/19 0603    theophylline (JEF-24) 24 hr capsule 200 mg, 200 mg, Oral, Daily, Deep Durand MD, 200 mg at 12/19/19 0836    tiotropium (SPIRIVA) capsule for inhaler 18 mcg, 18 mcg, Inhalation, Daily, Deep Durand MD, 18 mcg at 12/19/19 0839    traZODone (DESYREL) tablet 25 mg, 25 mg, Oral, HS PRN, Deep Durand MD  Justification if on more than two antipsychotics:  Only on his clozapine  Side effects if any:  None    Risks , benefits, side effects and precautions of medications discussed with patient and reminded patient to let us know any problems with medications     Objective:     Vital Signs:  Vitals:    12/18/19 1641 12/18/19 1928 12/19/19 0730 12/19/19 0732   BP: 100/63 113/51 116/67 (!) 88/65   BP Location: Left arm Left arm Left arm Left arm   Pulse: 76 74 84 72   Resp: 18 16 16    Temp: 97 6 °F (36 4 °C) (!) 97 °F (36 1 °C) 98 2 °F (36 8 °C)    TempSrc: Temporal Temporal Temporal    SpO2: 96% 90%     Weight:       Height:         Appearance:  age appropriate, casually dressed and overweight older than stated age, friendly polite and pleasant  with hair combed having taken a shower yesterday   Behavior:  evasive and guarded but less suspicious and still preoccupied with no psychosomatic complaints verbalized today   Speech:  normal pitch and normal volume but circumstantial and tangential with stilted speech    Mood:  anxious and dysthymic   Affect:  mood-congruent, elated and entitled anxious and irritated and sad at times but pleasant today    Thought Process:  goal directed and illogical slightly pressured and tangential and talks as if in a court of law   Thought Content:   Continues to have fixated somatic beliefs as before No current suicidal homicidal thoughts intent or plans reported  No phobias obsessions or compulsions reported  Still accusatory  to certain staff and not too fixated on multiple physical complaints today    She states she would like to go to the [place] and I encouraged her to do so by keeping up with the behavior plan   Perceptual Disturbances: None and does not appear responding to internal stimuli at times   Risk Potential: Tendency to not care for herself    Sensorium:  person, place, time/date, situation, day of week, month of year and time   Cognition:  grossly intact with no deficits in recent or remote memory or immediate recall   Consciousness:  alert and awake    Attention: Intact concentration and attention span   Intellect: Considered to be at least of average intelligence   Insight:  limited but improving   Judgment: improving      Motor Activity: no abnormal movements         Recent Labs:  Results Reviewed     None          I/O Past 24 hours:  No intake/output data recorded  No intake/output data recorded  Assessment / Plan:     Schizoaffective disorder, bipolar type ([facility] [address] )      Reason for continued inpatient care:  Because of underlying paranoia and inability to care for herself on her own and multiple somatic complaints  Acceptance by patient:  Accepting  Robert Yadav in recovery:  About living in same  personal care home once she feels better  Understanding of medications :  Has some understanding  Involved in reintegration process: On off unit privileges now  Trusting in relatoinship with psychiatrist:  Trusting    Recommended Treatment:    Medication changes:  1) none today  Non-pharmacological treatments  1) continue with  individual therapy group therapy, milieu therapy and occupational therapy and milieu therapy involving multidisciplinary team approach with psychotherapist, case management, nursing, peer support services, art therapist etc using recovery principles and psycho-education about accepting illness and need for treatment     2) encourage to cooperate with behavior plan  3) encourage to attend to ADLs like taking showers and wearing clean clothes   4) Encourage to attend groups   5) see how she does on off unit privileges  and expectations discussed with her and she did verbalize an understanding  Safety  1) Safety/communication plan established targeting dynamic risk factors above  Discharge Plan most likely back to the a:  Personal care boarding home with act services once her delusions are managed and mood becomes more stabilized and she attends to her ADLs she becomes more receptive to treatment compliance but she has not shown any improvement in the last 5 months since she has been on the unit and hence the referral needs to be initiated for the  [address] [place] [address] / Coordination of Care    Total floor / unit time spent today 15 minutes  Greater than 50% of total time was spent with the patient and / or family counseling and / or coordination of care  A description of the counseling / coordination of care  Patient's Rights, confidentiality and exceptions to confidentiality, use of automated medical record, [facility] staff access to medical record, and consent to treatment reviewed      Allie Guzman MD

## 2019-12-19 NOTE — PROGRESS NOTES
12/19/19 1100   Activity/Group Checklist   Group   ([facility]/Art Therapy/[place] )   Attendance Attended   Attendance Duration (min) 46-60   Interactions Interacted appropriately   Affect/Mood Appropriate;Normal range   Goals Achieved Identified feelings; Able to listen to others; Able to engage in interactions

## 2019-12-20 NOTE — PLAN OF CARE
Problem: Alteration in Thoughts and Perception  Goal: Verbalize thoughts and feelings  Description  Interventions:  - Promote a nonjudgmental and trusting relationship with the patient through active listening and therapeutic communication  - Assess patient's level of functioning, behavior and potential for risk  - Engage patient in 1 on 1 interactions for a minimum of 15 minutes each session  - Encourage patient to express fears, feelings, frustrations, and discuss symptoms    - [place] patient to reality, help patient recognize reality-based thinking   - Administer medications as ordered and assess for potential side effects  - Provide the patient education related to the signs and symptoms of the illness and desired effects of prescribed medications  Outcome: Progressing  Goal: Agree to be compliant with medication regime, as prescribed and report medication side effects  Description  Interventions:  - Offer appropriate PRN medication and supervise ingestion; conduct aims, as needed   Outcome: Progressing  Goal: Attend and participate in unit activities, including therapeutic, recreational, and educational groups  Description  Interventions:  - Provide therapeutic and educational activities daily, encourage attendance and participation, and document same in the medical record     CERTIFIED PEER SPECIALIST INTERVENTIONS:    Complete peer assessment with patient to assess their needs and identify their goals to complete while in the recovery program as well as once discharged into the community  Patient will complete WRAP Plan, Crisis Plan and [facility]  Patient will attend 50% of groups offered on the unit  Patient will complete a goal card weekly      Outcome: Progressing     Problem: Ineffective Coping  Goal: Demonstrates healthy coping skills  Outcome: Progressing  Goal: Participates in unit activities  Description  Interventions:  - Provide therapeutic environment   - Provide required programming   - Redirect inappropriate behaviors   Outcome: Progressing  Goal: Understands least restrictive measures  Description  Interventions:  - Utilize least restrictive behavior  Outcome: Progressing     Problem: Risk for Self Injury/Neglect  Goal: Verbalize thoughts and feelings  Description  Interventions:  - Assess and re-assess patient's lethality and potential for self-injury  - Engage patient in 1:1 interactions, daily, for a minimum of 15 minutes  - Encourage patient to express feelings, fears, frustrations, hopes  - Establish rapport/trust with patient   Outcome: Progressing  Goal: Attend and participate in unit activities, including therapeutic, recreational, and educational groups  Description  Interventions:  - Provide therapeutic and educational activities daily, encourage attendance and participation, and document same in the medical record  - Obtain collateral information, encourage visitation and family involvement in care   Outcome: Progressing     Problem: Depression  Goal: Refrain from isolation  Description  Interventions:  - Develop a trusting relationship   - Encourage socialization   Outcome: Progressing  Goal: Refrain from self-neglect  Outcome: Progressing     Problem: Anxiety  Goal: Anxiety is at manageable level  Description  Interventions:  - Assess and monitor patient's anxiety level  - Monitor for signs and symptoms of anxiety both physical and emotional (heart palpitations, chest pain, shortness of breath, headaches, nausea, feeling jumpy, restlessness, irritable, apprehensive)  - Collaborate with interdisciplinary team and initiate plan and interventions as ordered    - [place] patient to unit/surroundings  - Explain treatment plan  - Encourage participation in care  - Encourage verbalization of concerns/fears  - Identify coping mechanisms  - Assist in developing anxiety-reducing skills  - Administer/offer alternative therapies  - Limit or eliminate stimulants  Outcome: Progressing     Problem: Alteration in Orientation  Goal: Interact with staff daily  Description  Interventions:  - Assess and re-assess patient's level of orientation  - Engage patient in 1 on 1 interactions, daily, for a minimum of 15 minutes   - Establish rapport/trust with patient   Outcome: Progressing     Problem: SAFETY ADULT  Goal: Patient will remain free of falls  Description  INTERVENTIONS:  - Assess patient frequently for physical needs  -  Identify cognitive and physical deficits and behaviors that affect risk of falls  -  [place] fall precautions as indicated by assessment   - Educate patient/family on patient safety including physical limitations  - Instruct patient to call for assistance with activity based on assessment  - Modify environment to reduce risk of injury  - Consider OT/PT consult to assist with strengthening/mobility  Outcome: Progressing     Problem: RESPIRATORY - ADULT  Goal: Achieves optimal ventilation and oxygenation  Description  INTERVENTIONS:  - Assess for changes in respiratory status  - Assess for changes in mentation and behavior  - Position to facilitate oxygenation and minimize respiratory effort  - Oxygen administration by appropriate delivery method based on oxygen saturation (per order) or ABGs  - Initiate smoking cessation education as indicated  - Encourage broncho-pulmonary hygiene including cough, deep breathe, Incentive Spirometry  - Assess the need for suctioning and aspirate as needed  - Assess and instruct to report SOB or any respiratory difficulty  - Respiratory Therapy support as indicated  Outcome: Progressing     Problem: Nutrition/Hydration-ADULT  Goal: Nutrient/Hydration intake appropriate for improving, restoring or maintaining nutritional needs  Description  Monitor and assess patient's nutrition/hydration status for malnutrition  Collaborate with interdisciplinary team and initiate plan and interventions as ordered    Monitor patient's weight and dietary intake as ordered or per policy  Utilize nutrition screening tool and intervene as necessary  Determine patient's food preferences and provide high-protein, high-caloric foods as appropriate  INTERVENTIONS:  - Monitor oral intake, urinary output, labs, and treatment plans  - Assess nutrition and hydration status and recommend course of action  - Evaluate amount of meals eaten  - Assist patient with eating if necessary   - Allow adequate time for meals  - Recommend/ encourage appropriate diets, oral nutritional supplements, and vitamin/mineral supplements  - Order, calculate, and assess calorie counts as needed  - Recommend, monitor, and adjust tube feedings and TPN/PPN based on assessed needs  - Assess need for intravenous fluids  - Provide specific nutrition/hydration education as appropriate  - Include patient/family/caregiver in decisions related to nutrition  Outcome: Marah Dillard has been awake, alert, and visible intermittently out in the milieu  Ate 75% supper-egg salad and milk, and one Ensure, did not eat fruit on tray  Pt using incentive spirometer q 4 hrs with encouragement from staff  Pt working on obtaining pass for [employer]  Some interaction noted with select peers  No behavioral issues  Attended and participated in evening group and had snack after of 4 oreo cookies and milk  Compliant with all scheduled meds with prompting  Refused 2200 dose of Singulair  Resting in bed at present with O2 via nasal cannula at 1 L/M  Continue to monitor/assess for any changes

## 2019-12-20 NOTE — PROGRESS NOTES
Pharmacy Clozapine Monitoring Progress Note     Melody Fox is receiving a total daily dose of 150 mg of clozapine divided as 75mg at 1600 and 75mg at 2000  Pharmacist Recommendations Based on Assessment and Plan (below):    1  Patient is Clozapine-REMS eligible, ANC was updated, with weekly monitoring frequency and the next [address] is due on 12/27/2019 (ordered)  Assessment/Plan:    Phase of Treatment:     Current phase of treatment is initation/titration  Patient Clozapine REMS Eligibility:     Patient is eligible to receive clozapine and the [address] monitoring frequency is weekly per clozapine REMS  The patient's latest [address] value has been updated into the Clozapine-REMS program          ANC and Clozapine Level (if available)     The most recent [address] was   Lab Results   Component Value Date    NEUTROABS 3 70 12/20/2019    and the next lab is due on 12/27/19  Last Clozapine level (if available):    0   Lab Value Date/Time    CLOZAPINE 324 (L) 08/16/2019 1131     0   Lab Value Date/Time    NCLOZIP 207 08/16/2019 1131           Monitoring Parameters for Clozapine:     Clozapine Adverse Effect Suggested Monitoring Patient's Current Status    Agranulocytosis ANC baseline and then repeat weekly for first 6 months and every 2 weeks for the second 6 months  Maintenance after one year of therapy every month  The most recent [address] was   Lab Results   Component Value Date    NEUTROABS 3 70 12/20/2019       This ANC is considered normal range (ANC >/= 1500/mcL)  Continue current treatment and monitoring course  Respiratory Depression Please ensure patient is not on concomitant respiratory lowering medications such as benzodiazepines, sedative hypnotics (eg  zolpidem) This patient is not on respiratory depression lowering medications   Myocarditis Baseline and weekly EKG, CRP, BNP, and troponin    Weekly symptomatic complaints of fatigue, dyspnea, tachycardia, chest pain, palpitations, and fever for the first 4 weeks  Last EKG Results on  12/11/19 showed: "Normal sinus rhythm  Left axis deviation  Abnormal ECG  When compared with ECG of 18-NOV-2019 13:19,  Premature ventricular complexes are no longer Present  Confirmed by Liz Sánchez ([id number]) on 12/12/2019 8:45:13 AM"        Last QTC value was:  0   Lab Value Date/Time    QTCINT 467 12/11/2019 2041       Last BNP was: No results found for: NTBNP    Last Troponin was:  0   Lab Value Date/Time    TROPONINI <0 01 05/01/2019 1318    TROPONINI <0 04 05/10/2015 1948        Orthostatic hypotension/  bradycardia Blood pressure and vital signs      Monitor blood pressure and orthostatic hypotension at least twice daily upon initiation and continue to follow at least once daily afterwards  Patient's last recorded vital signs:       /71   Pulse 83   Temp 97 5 °F (36 4 °C) (Temporal)   Resp 18   Ht 5' 4" (1 626 m)   Wt 65 8 kg (145 lb)   SpO2 92%   BMI 24 89 kg/m²             Constipation (bowel obstruction due to high anticholinergic clozapine burden) Assess at baseline and weekly during first four months of therapy  Docusate 100mg BID and Miralax 17 grams daily recommended initially  Prophylactic bowel regimen ordered and includes: sucralfate   Sialorrhea Assess at baseline and weekly during first four months of therapy  May be managed using sublingual anticholinergic preparations (atropine 1% eye drops)  Please note that per current REMS protocol the provider can submit a treatment rationale that justifies clozapine treatment even if patient parameters are outside of REMS recommendations  The Clozapine REMS is an FDA-mandated program implemented by the manufacturers of clozapine  ([employer]  com/CpmgClozapineUI/home  u)  Pharmacy will continue to follow patient with team, thank you    Electronically signed by: Jayson Acosta, PharmD, [address], Clinical Pharmacist - Psychiatry

## 2019-12-20 NOTE — PROGRESS NOTES
Progress Note - Pushpa Morgan 1955, 60 y o  female MRN: [id number]    Unit/Bed#: [place] [facility] 110-02 Encounter: [id number]    Primary Care Provider: Amy Cameron PA-C   Date and time admitted to hospital: 7/23/2019  5:30 PM        * Schizoaffective disorder, bipolar type [facility])  Assessment & Plan  Psychiatry Progress Note  Patient continues to make steady progress taking showers about 2 times a week and attending to ADLs and going to most of the groups  She is still somewhat scared to go to the [place] thing she might need the oxygen but  states she will give it a try as it is next door to the [facility] unit and not too far away  But it appears that she is still harboring some psychosomatic believes and is not dwelling on them today     She has been sleeping well and has been still using the oxygen concentrator at night and reports that she is happy to hear that she can go back to the [facility] home she came from if she keeps up with her improvement rather than be transferred to the [facility]  She is now wanting to go on a pass with her sister as well for new year and I did tell her that it is quite possible with the current rate of improvement  She no longer voices any delusions that there may be a micro chip on 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   She tends to accuse certain staff periodically or tampering with her spiral meter and oxygen concentrator    Her son is in the [place] and she was happy to find out that he receive the care packets that she had sent for the holidays   Current medications:    Current Facility-Administered Medications:     acetaminophen (TYLENOL) tablet 650 mg, 650 mg, Oral, Q6H PRN, Carissa Willis MD    albuterol (PROVENTIL HFA,VENTOLIN HFA) inhaler 2 puff, 2 puff, Inhalation, Q4H PRN, Carissa Willis MD, 2 puff at 10/11/19 0424    aluminum-magnesium hydroxide-simethicone (MYLANTA) 200-200-20 mg/5 mL oral suspension 15 mL, 15 mL, Oral, Q4H PRN, MD Regina Dimas ammonium lactate (LAC-HYDRIN) 12 % lotion 1 application, 1 application, Topical, BID PRN, Chichi Lockett MD    benzonatate (TESSALON PERLES) capsule 100 mg, 100 mg, Oral, TID PRN, Chichi Lockett MD    benztropine (COGENTIN) injection 1 mg, 1 mg, Intramuscular, Q8H PRN, Chichi Lockett MD    carbamide peroxide (DEBROX) 6 5 % otic solution 5 drop, 5 drop, Left Ear, BID, Rona Santamaria MD, 5 drop at 12/19/19 1937    cloZAPine (CLOZARIL) tablet 25 mg, 25 mg, Oral, BID, Chichi Lockett MD, 25 mg at 12/19/19 2055    cloZAPine (CLOZARIL) tablet 50 mg, 50 mg, Oral, BID, Chichi Lockett MD, 50 mg at 12/19/19 2055    EPINEPHrine PF (ADRENALIN) 1 mg/mL injection 0 15 mg, 0 15 mg, Intramuscular, Once PRN, Chichi Lockett MD    fluticasone-vilanterol (BREO ELLIPTA) 200-25 MCG/INH inhaler 1 puff, 1 puff, Inhalation, Daily, Chichi Lockett MD, 1 puff at 12/20/19 0841    ketotifen (ZADITOR) 0 025 % ophthalmic solution 1 drop, 1 drop, Right Eye, BID PRN, Chichi Lockett MD    levothyroxine tablet 125 mcg, 125 mcg, Oral, Early Morning, Chichi Lockett MD, 125 mcg at 12/20/19 0607    magnesium hydroxide (MILK OF MAGNESIA) 400 mg/5 mL oral suspension 30 mL, 30 mL, Oral, Daily PRN, Chichi Lockett MD    montelukast (SINGULAIR) tablet 10 mg, 10 mg, Oral, HS, Chichi Lockett MD, 10 mg at 12/15/19 2107    OLANZapine (ZyPREXA) IM injection 5 mg, 5 mg, Intramuscular, Q8H PRN, Chichi Lockett MD    OLANZapine (ZyPREXA) tablet 5 mg, 5 mg, Oral, Q8H PRN, Chichi Lockett MD    ondansetron (ZOFRAN-ODT) dispersible tablet 4 mg, 4 mg, Oral, Q6H PRN, Chichi Lockett MD, 4 mg at 12/09/19 1757    pantoprazole (PROTONIX) EC tablet 40 mg, 40 mg, Oral, Early Morning, Chichi Lockett MD, 40 mg at 12/20/19 0607    polyethylene glycol (MIRALAX) packet 17 g, 17 g, Oral, Daily PRN, Chichi Lockett MD    polyvinyl alcohol (LIQUIFILM TEARS) 1 4 % ophthalmic solution 1 drop, 1 drop, Both Eyes, Q3H PRN, Chichi Lockett MD    sertraline (ZOLOFT) tablet 50 mg, 50 mg, Oral, BID, Chichi Lockett, MD, 50 mg at 12/20/19 0841    sucralfate (CARAFATE) oral suspension 1,000 mg, 1,000 mg, Oral, BID, Angelica Nageotte, MD, 1,000 mg at 12/20/19 0607    theophylline (JEF-24) 24 hr capsule 200 mg, 200 mg, Oral, Daily, Shilpa Trujillo MD, 200 mg at 12/20/19 0841    tiotropium (SPIRIVA) capsule for inhaler 18 mcg, 18 mcg, Inhalation, Daily, Shilpa Trujillo MD, 18 mcg at 12/20/19 0841    traZODone (DESYREL) tablet 25 mg, 25 mg, Oral, HS PRN, Shilpa Trujillo MD  Justification if on more than two antipsychotics:  Only on his clozapine  Side effects if any:  None    Risks , benefits, side effects and precautions of medications discussed with patient and reminded patient to let us know any problems with medications     Objective:     Vital Signs:  Vitals:    12/19/19 0730 12/19/19 0732 12/19/19 1638 12/19/19 2000   BP: 116/67 (!) 88/65 116/65 91/57   BP Location: Left arm Left arm Left arm Left arm   Pulse: 84 72 82 88   Resp: 16  18 14   Temp: 98 2 °F (36 8 °C)  98 3 °F (36 8 °C) (!) 97 3 °F (36 3 °C)   TempSrc: Temporal  Temporal Temporal   SpO2:   94% 92%   Weight:       Height:         Appearance:  age appropriate, casually dressed and overweight older than stated age, friendly polite and pleasant  with hair combed    Behavior:  evasive and guarded but less suspicious and still preoccupied with no psychosomatic complaints verbalized today   Speech:  normal pitch and normal volume but circumstantial and tangential with stilted speech    Mood:  anxious and dysthymic   Affect:  mood-congruent, elated and entitled anxious and irritated and sad at times but pleasant today    Thought Process:  goal directed and illogical slightly pressured and tangential and talks as if in a court of law   Thought Content:    No current suicidal homicidal thoughts intent or plans reported  No phobias obsessions or compulsions reported  Still accusatory  to certain staff and not too fixated on multiple physical complaints today    She states she would like to go to the [place] and I encouraged her to do so by keeping up with the behavior plan but she is scared that she may need oxygen and I suggested that she should give it a try as it is next door to the years yet not too far away   Perceptual Disturbances: None and does not appear responding to internal stimuli at times   Risk Potential: Tendency to not care for herself    Sensorium:  person, place, time/date, situation, day of week, month of year and time   Cognition:  grossly intact with no deficits in recent or remote memory or immediate recall   Consciousness:  alert and awake    Attention: Intact concentration and attention span   Intellect: Considered to be at least of average intelligence   Insight:  limited but improving   Judgment: improving      Motor Activity: no abnormal movements         Recent Labs:  Results Reviewed     None          I/O Past 24 hours:  No intake/output data recorded  No intake/output data recorded  Assessment / Plan:     [facility]ffective [facility]      Reason for continued inpatient care:  Because of underlying paranoia and inability to care for herself on her own and multiple somatic complaints  Acceptance by patient:  Accepting  Ros Patel in recovery:  About living in same  personal care home once she feels better  Understanding of medications :  Has some understanding  Involved in reintegration process: On off unit privileges now  Trusting in relatoinship with psychiatrist:  Trusting    Recommended Treatment:    Medication changes:  1) none today  Non-pharmacological treatments  1) continue with  individual therapy group therapy, milieu therapy and occupational therapy and milieu therapy involving multidisciplinary team approach with psychotherapist, case management, nursing, peer support services, art therapist etc using recovery principles and psycho-education about accepting illness and need for treatment     2) encourage to cooperate with behavior plan  3) encourage to attend to ADLs like taking showers and wearing clean clothes   4) Encourage to attend groups   5) see how she does on off unit privileges  and expectations discussed with her and she did verbalize an understanding  Safety  1) Safety/communication plan established targeting dynamic risk factors above  Discharge Plan most likely back to the a:  Personal care boarding home with act services once her delusions are managed and mood becomes more stabilized and she attends to her ADLs she becomes more receptive to treatment compliance but she has not shown any improvement in the last 5 months since she has been on the unit and hence the referral needs to be initiated for the  [address] [place] [address] / Coordination of Care    Total floor / unit time spent today 15 minutes  Greater than 50% of total time was spent with the patient and / or family counseling and / or coordination of care  A description of the counseling / coordination of care  Patient's Rights, confidentiality and exceptions to confidentiality, use of automated medical record, [facility] staff access to medical record, and consent to treatment reviewed      Jill Gayle MD

## 2019-12-20 NOTE — ASSESSMENT & PLAN NOTE
Psychiatry Progress Note  Patient continues to make steady progress taking showers about 2 times a week and attending to ADLs and going to most of the groups  She is still somewhat scared to go to the [address] thing she might need the oxygen but  states she will give it a try as it is next door to the [facility] unit and not too far away  But it appears that she is still harboring some psychosomatic believes and is not dwelling on them today     She has been sleeping well and has been still using the oxygen concentrator at night and reports that she is happy to hear that she can go back to the [facility] home she came from if she keeps up with her improvement rather than be transferred to the [facility]  She is now wanting to go on a pass with her sister as well for new year and I did tell her that it is quite possible with the current rate of improvement  She no longer voices any delusions that there may be a micro chip on 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   She tends to accuse certain staff periodically or tampering with her spiral meter and oxygen concentrator    Her son is in the [place] and she was happy to find out that he receive the care packets that she had sent for the holidays   Current medications:    Current Facility-Administered Medications:     acetaminophen (TYLENOL) tablet 650 mg, 650 mg, Oral, Q6H PRN, Teri Huggins MD    albuterol (PROVENTIL HFA,VENTOLIN HFA) inhaler 2 puff, 2 puff, Inhalation, Q4H PRN, Teri Huggins MD, 2 puff at 10/11/19 0424    aluminum-magnesium hydroxide-simethicone (MYLANTA) 200-200-20 mg/5 mL oral suspension 15 mL, 15 mL, Oral, Q4H PRN, Teri Huggins MD    ammonium lactate (LAC-HYDRIN) 12 % lotion 1 application, 1 application, Topical, BID PRN, Teri Huggins MD    benzonatate (TESSALON PERLES) capsule 100 mg, 100 mg, Oral, TID PRN, Teri Huggins MD    benztropine (COGENTIN) injection 1 mg, 1 mg, Intramuscular, Q8H PRN, Teri Huggins MD    carbamide peroxide (DEBROX) 6 5 % otic solution 5 drop, 5 drop, Left Ear, BID, Rona Desir MD, 5 drop at 12/19/19 1937    cloZAPine (CLOZARIL) tablet 25 mg, 25 mg, Oral, BID, Shi Pham MD, 25 mg at 12/19/19 2055    cloZAPine (CLOZARIL) tablet 50 mg, 50 mg, Oral, BID, Shi Pham MD, 50 mg at 12/19/19 2055    EPINEPHrine PF (ADRENALIN) 1 mg/mL injection 0 15 mg, 0 15 mg, Intramuscular, Once PRN, Shi Pahm MD    fluticasone-vilanterol (BREO ELLIPTA) 200-25 MCG/INH inhaler 1 puff, 1 puff, Inhalation, Daily, Shi Pham MD, 1 puff at 12/20/19 0841    ketotifen (ZADITOR) 0 025 % ophthalmic solution 1 drop, 1 drop, Right Eye, BID PRN, Shi Pham MD    levothyroxine tablet 125 mcg, 125 mcg, Oral, Early Morning, Shi Pham MD, 125 mcg at 12/20/19 0607    magnesium hydroxide (MILK OF MAGNESIA) 400 mg/5 mL oral suspension 30 mL, 30 mL, Oral, Daily PRN, Shi Pham MD    montelukast (SINGULAIR) tablet 10 mg, 10 mg, Oral, HS, Shi Pham MD, 10 mg at 12/15/19 2107    OLANZapine (ZyPREXA) IM injection 5 mg, 5 mg, Intramuscular, Q8H PRN, Shi Pham MD    OLANZapine (ZyPREXA) tablet 5 mg, 5 mg, Oral, Q8H PRN, Shi Pham MD    ondansetron (ZOFRAN-ODT) dispersible tablet 4 mg, 4 mg, Oral, Q6H PRN, Shi Pham MD, 4 mg at 12/09/19 1757    pantoprazole (PROTONIX) EC tablet 40 mg, 40 mg, Oral, Early Morning, Shi Pham MD, 40 mg at 12/20/19 0607    polyethylene glycol (MIRALAX) packet 17 g, 17 g, Oral, Daily PRN, Shi Pham MD    polyvinyl alcohol (LIQUIFILM TEARS) 1 4 % ophthalmic solution 1 drop, 1 drop, Both Eyes, Q3H PRN, Shi Pham MD    sertraline (ZOLOFT) tablet 50 mg, 50 mg, Oral, BID, Shi Pham MD, 50 mg at 12/20/19 0841    sucralfate (CARAFATE) oral suspension 1,000 mg, 1,000 mg, Oral, BID, Cat Torres MD, 1,000 mg at 12/20/19 0607    theophylline (JEF-24) 24 hr capsule 200 mg, 200 mg, Oral, Daily, Shi Pham MD, 200 mg at 12/20/19 0841    tiotropium (SPIRIVA) capsule for inhaler 18 mcg, 18 mcg, Inhalation, Daily, Sarah Hanna MD, 18 mcg at 12/20/19 0841    traZODone (DESYREL) tablet 25 mg, 25 mg, Oral, HS PRN, Sarah Hanna MD  Justification if on more than two antipsychotics:  Only on his clozapine  Side effects if any:  None    Risks , benefits, side effects and precautions of medications discussed with patient and reminded patient to let us know any problems with medications     Objective:     Vital Signs:  Vitals:    12/19/19 0730 12/19/19 0732 12/19/19 1638 12/19/19 2000   BP: 116/67 (!) 88/65 116/65 91/57   BP Location: Left arm Left arm Left arm Left arm   Pulse: 84 72 82 88   Resp: 16  18 14   Temp: 98 2 °F (36 8 °C)  98 3 °F (36 8 °C) (!) 97 3 °F (36 3 °C)   TempSrc: Temporal  Temporal Temporal   SpO2:   94% 92%   Weight:       Height:         Appearance:  age appropriate, casually dressed and overweight older than stated age, friendly polite and pleasant  with hair combed    Behavior:  evasive and guarded but less suspicious and still preoccupied with no psychosomatic complaints verbalized today   Speech:  normal pitch and normal volume but circumstantial and tangential with stilted speech    Mood:  anxious and dysthymic   Affect:  mood-congruent, elated and entitled anxious and irritated and sad at times but pleasant today    Thought Process:  goal directed and illogical slightly pressured and tangential and talks as if in a court of law   Thought Content:    No current suicidal homicidal thoughts intent or plans reported  No phobias obsessions or compulsions reported  Still accusatory  to certain staff and not too fixated on multiple physical complaints today    She states she would like to go to the [place] and I encouraged her to do so by keeping up with the behavior plan but she is scared that she may need oxygen and I suggested that she should give it a try as it is next door to the years yet not too far away   Perceptual Disturbances: None and does not appear responding to internal stimuli at times   Risk Potential: Tendency to not care for herself    Sensorium:  person, place, time/date, situation, day of week, month of year and time   Cognition:  grossly intact with no deficits in recent or remote memory or immediate recall   Consciousness:  alert and awake    Attention: Intact concentration and attention span   Intellect: Considered to be at least of average intelligence   Insight:  limited but improving   Judgment: improving      Motor Activity: no abnormal movements         Recent Labs:  Results Reviewed     None          I/O Past 24 hours:  No intake/output data recorded  No intake/output data recorded  Assessment / Plan:     [facility]ffective [facility]      Reason for continued inpatient care:  Because of underlying paranoia and inability to care for herself on her own and multiple somatic complaints  Acceptance by patient:  Accepting  Jenn Calhoun in recovery:  About living in same  personal care home once she feels better  Understanding of medications :  Has some understanding  Involved in reintegration process: On off unit privileges now  Trusting in relatoinship with psychiatrist:  Trusting    Recommended Treatment:    Medication changes:  1) none today  Non-pharmacological treatments  1) continue with  individual therapy group therapy, milieu therapy and occupational therapy and milieu therapy involving multidisciplinary team approach with psychotherapist, case management, nursing, peer support services, art therapist etc using recovery principles and psycho-education about accepting illness and need for treatment     2) encourage to cooperate with behavior plan  3) encourage to attend to ADLs like taking showers and wearing clean clothes   4) Encourage to attend groups   5) see how she does on off unit privileges  and expectations discussed with her and she did verbalize an understanding  Safety  1) Safety/communication plan established targeting dynamic risk factors above  Discharge Plan most likely back to the a:  Personal care boarding home with act services once her delusions are managed and mood becomes more stabilized and she attends to her ADLs she becomes more receptive to treatment compliance but she has not shown any improvement in the last 5 months since she has been on the unit and hence the referral needs to be initiated for the  [address] [place] [address] / Coordination of Care    Total floor / unit time spent today 15 minutes  Greater than 50% of total time was spent with the patient and / or family counseling and / or coordination of care  A description of the counseling / coordination of care  Patient's Rights, confidentiality and exceptions to confidentiality, use of automated medical record, [facility] staff access to medical record, and consent to treatment reviewed      Jeet Levine MD

## 2019-12-20 NOTE — PROGRESS NOTES
12/20/19 0800   Team Meeting   Meeting Type Daily Rounds   Team Members Present   Team Members Present Physician;Nurse;; Other (Discipline and Name)   Physician Team Member Dr Ricky Manjarrez, RN   Care Management Team Member Brenda Muro   Other (Discipline and Name) Yudi Hamilton LCSW     Patient attended groups yesterday  Less somatic complaints  Slept

## 2019-12-20 NOTE — PLAN OF CARE
Problem: PAIN - ADULT  Goal: Verbalizes/displays adequate comfort level or baseline comfort level  Description  Interventions:  - Encourage patient to monitor pain and request assistance  - Assess pain using appropriate pain scale  - Administer analgesics based on type and severity of pain and evaluate response  - Implement non-pharmacological measures as appropriate and evaluate response  - Consider cultural and social influences on pain and pain management  - Notify physician/advanced practitioner if interventions unsuccessful or patient reports new pain  Outcome: Progressing     Problem: SAFETY ADULT  Goal: Patient will remain free of falls  Description  INTERVENTIONS:  - Assess patient frequently for physical needs  -  Identify cognitive and physical deficits and behaviors that affect risk of falls    -  [place] fall precautions as indicated by assessment   - Educate patient/family on patient safety including physical limitations  - Instruct patient to call for assistance with activity based on assessment  - Modify environment to reduce risk of injury  - Consider OT/PT consult to assist with strengthening/mobility  Outcome: Progressing     Problem: RESPIRATORY - ADULT  Goal: Achieves optimal ventilation and oxygenation  Description  INTERVENTIONS:  - Assess for changes in respiratory status  - Assess for changes in mentation and behavior  - Position to facilitate oxygenation and minimize respiratory effort  - Oxygen administration by appropriate delivery method based on oxygen saturation (per order) or ABGs  - Initiate smoking cessation education as indicated  - Encourage broncho-pulmonary hygiene including cough, deep breathe, Incentive Spirometry  - Assess the need for suctioning and aspirate as needed  - Assess and instruct to report SOB or any respiratory difficulty  - Respiratory Therapy support as indicated  Outcome: Progressing     Problem: SLEEP DISTURBANCE  Goal: Will exhibit normal sleeping pattern  Description  Interventions:  -  Assess the patients sleep pattern, noting recent changes  - Administer medication as ordered  - Decrease environmental stimuli, including noise, as appropriate during the night  - Encourage the patient to actively participate in unit groups and or exercise during the day to enhance ability to achieve adequate sleep at night  - Assess the patient, in the morning, encouraging a description of sleep experience  Outcome: Progressing    ~Maggie maintained on ongoing fall and SAFE precaution   Laying in bed with eyes closed, breath even and unlabored   On O2 with humidifier @1L/m via nasal cannula   Q 15 minutes rounding  No somatic complaint overnight  No PRN needed for sleep aid  No indication of pain or discomfort  No respiratory distress  Wolf Caballero continue to monitor    ~Maggie has a weekly scheduled lab:CBC w/Diff

## 2019-12-20 NOTE — PLAN OF CARE
Problem: Alteration in Thoughts and Perception  Goal: Treatment Goal: Gain control of psychotic behaviors/thinking, reduce/eliminate presenting symptoms and demonstrate improved reality functioning upon discharge  Outcome: Progressing  Goal: Verbalize thoughts and feelings  Description  Interventions:  - Promote a nonjudgmental and trusting relationship with the patient through active listening and therapeutic communication  - Assess patient's level of functioning, behavior and potential for risk  - Engage patient in 1 on 1 interactions for a minimum of 15 minutes each session  - Encourage patient to express fears, feelings, frustrations, and discuss symptoms    - [place] patient to reality, help patient recognize reality-based thinking   - Administer medications as ordered and assess for potential side effects  - Provide the patient education related to the signs and symptoms of the illness and desired effects of prescribed medications  Outcome: Progressing  Goal: Agree to be compliant with medication regime, as prescribed and report medication side effects  Description  Interventions:  - Offer appropriate PRN medication and supervise ingestion; conduct aims, as needed   Outcome: Progressing  Goal: Attend and participate in unit activities, including therapeutic, recreational, and educational groups  Description  Interventions:  - Provide therapeutic and educational activities daily, encourage attendance and participation, and document same in the medical record     CERTIFIED PEER SPECIALIST INTERVENTIONS:    Complete peer assessment with patient to assess their needs and identify their goals to complete while in the recovery program as well as once discharged into the community  Patient will complete WRAP Plan, Crisis Plan and 5 Life Domains  Patient will attend 50% of groups offered on the unit  Patient will complete a goal card weekly      Outcome: Progressing     Problem: Ineffective Coping  Goal: Participates in unit activities  Description  Interventions:  - Provide therapeutic environment   - Provide required programming   - Redirect inappropriate behaviors   Outcome: Progressing  Goal: Patient/Family verbalizes awareness of resources  Outcome: Progressing  Goal: Understands least restrictive measures  Description  Interventions:  - Utilize least restrictive behavior  Outcome: Progressing     Problem: Risk for Self Injury/Neglect  Goal: Treatment Goal: Remain safe during length of stay, learn and adopt new coping skills, and be free of self-injurious ideation, impulses and acts at the time of discharge  Outcome: Progressing  Goal: Verbalize thoughts and feelings  Description  Interventions:  - Assess and re-assess patient's lethality and potential for self-injury  - Engage patient in 1:1 interactions, daily, for a minimum of 15 minutes  - Encourage patient to express feelings, fears, frustrations, hopes  - Establish rapport/trust with patient   Outcome: Progressing  Goal: Refrain from harming self  Description  Interventions:  - Monitor patient closely, per order  - Develop a trusting relationship  - Supervise medication ingestion, monitor effects and side effects   Outcome: Progressing  Goal: Attend and participate in unit activities, including therapeutic, recreational, and educational groups  Description  Interventions:  - Provide therapeutic and educational activities daily, encourage attendance and participation, and document same in the medical record  - Obtain collateral information, encourage visitation and family involvement in care   Outcome: Progressing     Problem: Depression  Goal: Treatment Goal: Demonstrate behavioral control of depressive symptoms, verbalize feelings of improved mood/affect, and adopt new coping skills prior to discharge  Outcome: Progressing  Goal: Verbalize thoughts and feelings  Description  Interventions:  - Assess and re-assess patient's level of risk   - Engage patient in 1:1 interactions, daily, for a minimum of 15 minutes   - Encourage patient to express feelings, fears, frustrations, hopes   Outcome: Progressing  Goal: Refrain from isolation  Description  Interventions:  - Develop a trusting relationship   - Encourage socialization   Outcome: Progressing     Problem: Anxiety  Goal: Anxiety is at manageable level  Description  Interventions:  - Assess and monitor patient's anxiety level  - Monitor for signs and symptoms of anxiety both physical and emotional (heart palpitations, chest pain, shortness of breath, headaches, nausea, feeling jumpy, restlessness, irritable, apprehensive)  - Collaborate with interdisciplinary team and initiate plan and interventions as ordered    - [place] patient to unit/surroundings  - Explain treatment plan  - Encourage participation in care  - Encourage verbalization of concerns/fears  - Identify coping mechanisms  - Assist in developing anxiety-reducing skills  - Administer/offer alternative therapies  - Limit or eliminate stimulants  Outcome: Progressing     Problem: Alteration in Orientation  Goal: Interact with staff daily  Description  Interventions:  - Assess and re-assess patient's level of orientation  - Engage patient in 1 on 1 interactions, daily, for a minimum of 15 minutes   - Establish rapport/trust with patient   Outcome: Progressing     Problem: PAIN - ADULT  Goal: Verbalizes/displays adequate comfort level or baseline comfort level  Description  Interventions:  - Encourage patient to monitor pain and request assistance  - Assess pain using appropriate pain scale  - Administer analgesics based on type and severity of pain and evaluate response  - Implement non-pharmacological measures as appropriate and evaluate response  - Consider cultural and social influences on pain and pain management  - Notify physician/advanced practitioner if interventions unsuccessful or patient reports new pain  Outcome: Progressing     Problem: SAFETY ADULT  Goal: Patient will remain free of falls  Description  INTERVENTIONS:  - Assess patient frequently for physical needs  -  Identify cognitive and physical deficits and behaviors that affect risk of falls  -  [place] fall precautions as indicated by assessment   - Educate patient/family on patient safety including physical limitations  - Instruct patient to call for assistance with activity based on assessment  - Modify environment to reduce risk of injury  - Consider OT/PT consult to assist with strengthening/mobility  Outcome: Progressing     Problem: Nutrition/Hydration-ADULT  Goal: Nutrient/Hydration intake appropriate for improving, restoring or maintaining nutritional needs  Description  Monitor and assess patient's nutrition/hydration status for malnutrition  Collaborate with interdisciplinary team and initiate plan and interventions as ordered  Monitor patient's weight and dietary intake as ordered or per policy  Utilize nutrition screening tool and intervene as necessary  Determine patient's food preferences and provide high-protein, high-caloric foods as appropriate  INTERVENTIONS:  - Monitor oral intake, urinary output, labs, and treatment plans  - Assess nutrition and hydration status and recommend course of action  - Evaluate amount of meals eaten  - Assist patient with eating if necessary   - Allow adequate time for meals  - Recommend/ encourage appropriate diets, oral nutritional supplements, and vitamin/mineral supplements  - Order, calculate, and assess calorie counts as needed  - Recommend, monitor, and adjust tube feedings and TPN/PPN based on assessed needs  - Assess need for intravenous fluids  - Provide specific nutrition/hydration education as appropriate  - Include patient/family/caregiver in decisions related to nutrition  Outcome: Progressing   Visible intermittently, mostly isolative to her bed, non compliant with regularly being out of bed or using incentive spirometer  Attended [facility]  No somatic complaints voiced  Ate 25% of breakfast - cream of wheat; ate 50% of lunch - pudding and 1/2 of the pasta  No other behaviors or issues noted  Med compliant  Continue to monitor

## 2019-12-21 NOTE — PLAN OF CARE
Problem: Alteration in Thoughts and Perception  Goal: Verbalize thoughts and feelings  Description  Interventions:  - Promote a nonjudgmental and trusting relationship with the patient through active listening and therapeutic communication  - Assess patient's level of functioning, behavior and potential for risk  - Engage patient in 1 on 1 interactions for a minimum of 15 minutes each session  - Encourage patient to express fears, feelings, frustrations, and discuss symptoms    - [place] patient to reality, help patient recognize reality-based thinking   - Administer medications as ordered and assess for potential side effects  - Provide the patient education related to the signs and symptoms of the illness and desired effects of prescribed medications  Outcome: Progressing  Goal: Agree to be compliant with medication regime, as prescribed and report medication side effects  Description  Interventions:  - Offer appropriate PRN medication and supervise ingestion; conduct aims, as needed   Outcome: Progressing     Problem: Alteration in Thoughts and Perception  Goal: Attend and participate in unit activities, including therapeutic, recreational, and educational groups  Description  Interventions:  - Provide therapeutic and educational activities daily, encourage attendance and participation, and document same in the medical record     CERTIFIED PEER SPECIALIST INTERVENTIONS:    Complete peer assessment with patient to assess their needs and identify their goals to complete while in the recovery program as well as once discharged into the community  Patient will complete WRAP Plan, Crisis Plan and 5 Life Domains  Patient will attend 50% of groups offered on the unit  Patient will complete a goal card weekly      Outcome: Not Progressing  Goal: Complete daily ADLs, including personal hygiene independently, as able  Description  Interventions:  - Observe, teach, and assist patient with ADLS  - Monitor and promote a balance of rest/activity, with adequate nutrition and elimination   Outcome: Not Progressing     Problem: Depression  Goal: Refrain from isolation  Description  Interventions:  - Develop a trusting relationship   - Encourage socialization   Outcome: Not Progressing     2[address] Agustin Edgar began shift rather demanding w/requests, some of which are not timely or her purview (I e  Wanting to arrange for her O2 upon discharge)  Talking about return to the [place] setting as though it is imminent  Was encouraged to focus on here & now of earning that pass she wants for New Year's  No attention to hygiene; was brought to her attention need to flush toilet after each use as peers complaining  Says it gives her R shoulder pain to do this  Discussed alternative ways to flush, but, she rejected  She was out of room through supper hour, but, not since except for HS snack  Rather, dozing in bed  For meal ate all egg salad, 3 packs of saltines, & Ensure  For HS snack had packet Oreo cookies & milk  Took all scheduled medicines except Singulair  Used the mobifriends, but, not her best volumes, achieved 1100ml  Did not attend PM Group offering  Wearing now her QHS humidified nasal O2 @ 1L for bed

## 2019-12-21 NOTE — PLAN OF CARE
Problem: PAIN - ADULT  Goal: Verbalizes/displays adequate comfort level or baseline comfort level  Description  Interventions:  - Encourage patient to monitor pain and request assistance  - Assess pain using appropriate pain scale  - Administer analgesics based on type and severity of pain and evaluate response  - Implement non-pharmacological measures as appropriate and evaluate response  - Consider cultural and social influences on pain and pain management  - Notify physician/advanced practitioner if interventions unsuccessful or patient reports new pain  Outcome: Progressing     Problem: SAFETY ADULT  Goal: Patient will remain free of falls  Description  INTERVENTIONS:  - Assess patient frequently for physical needs  -  Identify cognitive and physical deficits and behaviors that affect risk of falls    -  [place] fall precautions as indicated by assessment   - Educate patient/family on patient safety including physical limitations  - Instruct patient to call for assistance with activity based on assessment  - Modify environment to reduce risk of injury  - Consider OT/PT consult to assist with strengthening/mobility  Outcome: Progressing     Problem: RESPIRATORY - ADULT  Goal: Achieves optimal ventilation and oxygenation  Description  INTERVENTIONS:  - Assess for changes in respiratory status  - Assess for changes in mentation and behavior  - Position to facilitate oxygenation and minimize respiratory effort  - Oxygen administration by appropriate delivery method based on oxygen saturation (per order) or ABGs  - Initiate smoking cessation education as indicated  - Encourage broncho-pulmonary hygiene including cough, deep breathe, Incentive Spirometry  - Assess the need for suctioning and aspirate as needed  - Assess and instruct to report SOB or any respiratory difficulty  - Respiratory Therapy support as indicated  Outcome: Progressing     Problem: SLEEP DISTURBANCE  Goal: Will exhibit normal sleeping pattern  Description  Interventions:  -  Assess the patients sleep pattern, noting recent changes  - Administer medication as ordered  - Decrease environmental stimuli, including noise, as appropriate during the night  - Encourage the patient to actively participate in unit groups and or exercise during the day to enhance ability to achieve adequate sleep at night  - Assess the patient, in the morning, encouraging a description of sleep experience  Outcome: Progressing    Maggie maintained on ongoing fall and SAFE precaution   Laying in bed with eyes closed, breath even and unlabored   On O2 with humidifier @1L/m via nasal cannula   Q 15 minutes rounding   No somatic complaint overnight  No PRN needed for sleep aid   No indication of pain or discomfort   No respiratory distress   Will continue to monitor

## 2019-12-21 NOTE — PROGRESS NOTES
Progress Note - Behavioral Health   Ali Fulling 60 y o  female MRN: [id number]  Unit/Bed#: [facility] 110-02 Encounter: [id number]    Assessment/Plan   Principal Problem:    Sch[facility]oaffective disorder, bipolar type ([facility] )  Active Problems:    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[facility] )    Acquired hypothyroidism    Gastroesophageal reflux disease without esophagitis    At risk for aspiration    Ear ache      Subjective:  Patient seclusive to her room laying in bed  States she has no complaints today  Does report side effect of drooling but does not want medication for the side effect  Also states she is slightly sedated in the morning  Recently placed on Zoloft states having more energy and has improved self-esteem  States she sleeps well  Denies psychosis  Denied delusional material   No signs of dylan  No agitation  Reports mild anxiety in regards to when she is leaving hospital   Hopeful for upcoming discharge      Current Medications:  Current Facility-Administered Medications   Medication Dose Route Frequency    acetaminophen (TYLENOL) tablet 650 mg  650 mg Oral Q6H PRN    albuterol (PROVENTIL HFA,VENTOLIN HFA) inhaler 2 puff  2 puff Inhalation Q4H PRN    aluminum-magnesium hydroxide-simethicone (MYLANTA) 200-200-20 mg/5 mL oral suspension 15 mL  15 mL Oral Q4H PRN    ammonium lactate (LAC-HYDRIN) 12 % lotion 1 application  1 application Topical BID PRN    benzonatate (TESSALON PERLES) capsule 100 mg  100 mg Oral TID PRN    benztropine (COGENTIN) injection 1 mg  1 mg Intramuscular Q8H PRN    carbamide peroxide (DEBROX) 6 5 % otic solution 5 drop  5 drop Left Ear BID    cloZAPine (CLOZARIL) tablet 25 mg  25 mg Oral BID    cloZAPine (CLOZARIL) tablet 50 mg  50 mg Oral BID    EPINEPHrine PF (ADRENALIN) 1 mg/mL injection 0 15 mg  0 15 mg Intramuscular Once PRN    fluticasone-vilanterol (BREO ELLIPTA) 200-25 MCG/INH inhaler 1 puff  1 puff Inhalation Daily    ketotifen (ZADITOR) 0 025 % ophthalmic solution 1 drop  1 drop Right Eye BID PRN    levothyroxine tablet 125 mcg  125 mcg Oral Early Morning    magnesium hydroxide (MILK OF MAGNESIA) 400 mg/5 mL oral suspension 30 mL  30 mL Oral Daily PRN    montelukast (SINGULAIR) tablet 10 mg  10 mg Oral HS    OLANZapine (ZyPREXA) IM injection 5 mg  5 mg Intramuscular Q8H PRN    OLANZapine (ZyPREXA) tablet 5 mg  5 mg Oral Q8H PRN    ondansetron (ZOFRAN-ODT) dispersible tablet 4 mg  4 mg Oral Q6H PRN    pantoprazole (PROTONIX) EC tablet 40 mg  40 mg Oral Early Morning    polyethylene glycol (MIRALAX) packet 17 g  17 g Oral Daily PRN    polyvinyl alcohol (LIQUIFILM TEARS) 1 4 % ophthalmic solution 1 drop  1 drop Both Eyes Q3H PRN    sertraline (ZOLOFT) tablet 50 mg  50 mg Oral BID    sucralfate (CARAFATE) oral suspension 1,000 mg  1,000 mg Oral BID    theophylline (JEF-24) 24 hr capsule 200 mg  200 mg Oral Daily    tiotropium (SPIRIVA) capsule for inhaler 18 mcg  18 mcg Inhalation Daily    traZODone (DESYREL) tablet 25 mg  25 mg Oral HS PRN       Behavioral Health Medications: all current active meds have been reviewed and continue current psychiatric medications  Vitals:  Vitals:    12/21/19 0731   BP: 98/67   Pulse: 74   Resp:    Temp:    SpO2:        Laboratory results:    I have personally reviewed all pertinent laboratory/tests results    Most Recent Labs:   Lab Results   Component Value Date    WBC 7 40 12/20/2019    RBC 4 51 12/20/2019    HGB 13 9 12/20/2019    HCT 41 3 12/20/2019     12/20/2019    RDW 14 1 12/20/2019    NEUTROABS 3 70 12/20/2019    SODIUM 140 10/25/2019    K 4 1 10/25/2019     10/25/2019    CO2 29 10/25/2019    BUN 16 10/25/2019    CREATININE 0 75 10/25/2019    GLUC 93 10/25/2019    GLUF 93 10/25/2019    CALCIUM 9 1 10/25/2019    AST 23 10/25/2019    ALT 25 10/25/2019    ALKPHOS 104 10/25/2019    TP 6 3 10/25/2019    ALB 3 4 10/25/2019    TBILI 0 30 10/25/2019    CHOLESTEROL 210 (H) 10/25/2019    HDL 38 (L) 10/25/2019    TRIG 134 10/25/2019 [address] 145 (H) 10/25/2019    [place] 172 10/25/2019    LITHIUM <0 2 (L) 05/01/2019    AMMONIA 13 02/22/2016    CWW8NSUDNIOW 0 985 08/21/2019    FREET4 1 4 05/12/2015    RPR Non-Reactive 05/02/2019    HGBA1C 5 5 01/17/2019     01/17/2019       Psychiatric Review of Systems:  Behavior over the last 24 hours:  unchanged  Sleep: normal  Appetite: normal  Medication side effects: Yes, drooling and mild sedation  ROS: no complaints, all others negative    Mental Status Evaluation:  Appearance:  casually dressed, older than stated age   Behavior:  guarded but cooperative   Speech:  loud   Mood:  euthymic   Affect:  mood-congruent   Language sparse   Thought Process:  goal directed and Linear   Thought Content:  normal   Perceptual Disturbances: None   Risk Potential: Denied SI/HI  Potential for aggression no   Sensorium:  person, place and time/date   Cognition:  grossly intact   Consciousness:  alert and awake    Recent and Remote Memory fair   Attention: attention span appeared shorter than expected for age   Insight:  Partial   Judgment: Partial   Gait/Station: Did not assess   Motor Activity: no abnormal movements     Progress Toward Goals:  Slow progression    Recommended Treatment: Continue with group therapy, milieu therapy and occupational therapy  1   Continue current medications  2  Continue following CBC with diff    Risks, benefits and possible side effects of Medications:   Risks, benefits, and possible side effects of medications explained to patient and patient verbalizes understanding        Nissa Guillory PA-C

## 2019-12-22 NOTE — PLAN OF CARE
Problem: Alteration in Thoughts and Perception  Goal: Agree to be compliant with medication regime, as prescribed and report medication side effects  Description  Interventions:  - Offer appropriate PRN medication and supervise ingestion; conduct aims, as needed   Outcome: Progressing  Goal: Attend and participate in unit activities, including therapeutic, recreational, and educational groups  Description  Interventions:  - Provide therapeutic and educational activities daily, encourage attendance and participation, and document same in the medical record     CERTIFIED PEER SPECIALIST INTERVENTIONS:    Complete peer assessment with patient to assess their needs and identify their goals to complete while in the recovery program as well as once discharged into the community  Patient will complete WRAP Plan, Crisis Plan and 5 Life Domains  Patient will attend 50% of groups offered on the unit  Patient will complete a goal card weekly  Outcome: Progressing     Problem: Alteration in Thoughts and Perception  Goal: Complete daily ADLs, including personal hygiene independently, as able  Description  Interventions:  - Observe, teach, and assist patient with ADLS  - Monitor and promote a balance of rest/activity, with adequate nutrition and elimination   Outcome: Not Progressing     Problem: Depression  Goal: Refrain from isolation  Description  Interventions:  - Develop a trusting relationship   - Encourage socialization   Outcome: Not Progressing     2[address] Yanna Singh had her sister bring in donuts & apple cider as a [place] treat for peers & staff  Visited w/sister briefly w/warm interactions between them  She ate 100% of her meal (egg salad, juice, pie, Ensure) & had a donut/apple cider for HS snack  Has spent much of shift in bed  Was reminded of pass goal for [place] day & that has slacked off in activity & group attendance the past 24hrs  Did hop out of bed to go take part in PM Group   Was medicine compliant except for the Singulair  No attention paid to hygiene  Used the [employer] achieving volumes of 1250ml  Wearing now her QHS humidified nasal O2 @ 1L for bed

## 2019-12-22 NOTE — PROGRESS NOTES
Progress Note - Behavioral Health   Victoria Alu 60 y o  female MRN: [id number]  Unit/Bed#: MARCIO ADAIR [facility] 110-02 Encounter: [id number]    Assessment/Plan   Principal Problem:    Schizoaffective disorder, bipolar type ([facility] )  Active Problems:    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[facility] )    Acquired hypothyroidism    Gastroesophageal reflux disease without esophagitis    At risk for aspiration    Ear ache      Subjective:  Patient reports feeling well  No changes in 24 hours  Reports only side effect of drooling and does not want medication for side effect  States depression and anxiety more under control  Denied psychotic symptoms  Denied delusional material   No signs of dylan  No agitation  Medication compliant  Appears to be tolerating medications well without serious side effects      Current Medications:  Current Facility-Administered Medications   Medication Dose Route Frequency    acetaminophen (TYLENOL) tablet 650 mg  650 mg Oral Q6H PRN    albuterol (PROVENTIL HFA,VENTOLIN HFA) inhaler 2 puff  2 puff Inhalation Q4H PRN    aluminum-magnesium hydroxide-simethicone (MYLANTA) 200-200-20 mg/5 mL oral suspension 15 mL  15 mL Oral Q4H PRN    ammonium lactate (LAC-HYDRIN) 12 % lotion 1 application  1 application Topical BID PRN    benzonatate (TESSALON PERLES) capsule 100 mg  100 mg Oral TID PRN    benztropine (COGENTIN) injection 1 mg  1 mg Intramuscular Q8H PRN    carbamide peroxide (DEBROX) 6 5 % otic solution 5 drop  5 drop Left Ear BID    cloZAPine (CLOZARIL) tablet 25 mg  25 mg Oral BID    cloZAPine (CLOZARIL) tablet 50 mg  50 mg Oral BID    EPINEPHrine PF (ADRENALIN) 1 mg/mL injection 0 15 mg  0 15 mg Intramuscular Once PRN    fluticasone-vilanterol (BREO ELLIPTA) 200-25 MCG/INH inhaler 1 puff  1 puff Inhalation Daily    ketotifen (ZADITOR) 0 025 % ophthalmic solution 1 drop  1 drop Right Eye BID PRN    levothyroxine tablet 125 mcg  125 mcg Oral Early Morning    magnesium hydroxide (MILK OF MAGNESIA) 400 mg/5 mL oral suspension 30 mL  30 mL Oral Daily PRN    montelukast (SINGULAIR) tablet 10 mg  10 mg Oral HS    OLANZapine (ZyPREXA) IM injection 5 mg  5 mg Intramuscular Q8H PRN    OLANZapine (ZyPREXA) tablet 5 mg  5 mg Oral Q8H PRN    ondansetron (ZOFRAN-ODT) dispersible tablet 4 mg  4 mg Oral Q6H PRN    pantoprazole (PROTONIX) EC tablet 40 mg  40 mg Oral Early Morning    polyethylene glycol (MIRALAX) packet 17 g  17 g Oral Daily PRN    polyvinyl alcohol (LIQUIFILM TEARS) 1 4 % ophthalmic solution 1 drop  1 drop Both Eyes Q3H PRN    sertraline (ZOLOFT) tablet 50 mg  50 mg Oral BID    sucralfate (CARAFATE) oral suspension 1,000 mg  1,000 mg Oral BID    theophylline (JEF-24) 24 hr capsule 200 mg  200 mg Oral Daily    tiotropium (SPIRIVA) capsule for inhaler 18 mcg  18 mcg Inhalation Daily    traZODone (DESYREL) tablet 25 mg  25 mg Oral HS PRN       Behavioral Health Medications: all current active meds have been reviewed and continue current psychiatric medications  Vitals:  Vitals:    12/22/19 0705   BP: 97/62   Pulse: 74   Resp: 18   Temp:    SpO2:        Laboratory results:    I have personally reviewed all pertinent laboratory/tests results    Most Recent Labs:   Lab Results   Component Value Date    WBC 7 40 12/20/2019    RBC 4 51 12/20/2019    HGB 13 9 12/20/2019    HCT 41 3 12/20/2019     12/20/2019    RDW 14 1 12/20/2019    NEUTROABS 3 70 12/20/2019    SODIUM 140 10/25/2019    K 4 1 10/25/2019     10/25/2019    CO2 29 10/25/2019    BUN 16 10/25/2019    CREATININE 0 75 10/25/2019    GLUC 93 10/25/2019    GLUF 93 10/25/2019    CALCIUM 9 1 10/25/2019    AST 23 10/25/2019    ALT 25 10/25/2019    ALKPHOS 104 10/25/2019    TP 6 3 10/25/2019    ALB 3 4 10/25/2019    TBILI 0 30 10/25/2019    CHOLESTEROL 210 (H) 10/25/2019    HDL 38 (L) 10/25/2019    TRIG 134 10/25/2019    LDLCALC 145 (H) 10/25/2019    [place] 172 10/25/2019    LITHIUM <0 2 (L) 05/01/2019    AMMONIA 13 02/22/2016    JIR9ELTLMDXP 0 985 08/21/2019    FREET4 1 4 05/12/2015    RPR Non-Reactive 05/02/2019    HGBA1C 5 5 01/17/2019     01/17/2019       Psychiatric Review of Systems:  Behavior over the last 24 hours:  unchanged  Sleep: normal  Appetite: normal  Medication side effects: Yes, drooling and mild sedation  ROS: no complaints, all others negative    Mental Status Evaluation:  Appearance:  casually dressed and older than stated age   Behavior:  guarded but cooperative   Speech:  loud   Mood:  euthymic   Affect:  mood-congruent   Language sparse   Thought Process:  goal directed and Linear   Thought Content:  normal   Perceptual Disturbances: None   Risk Potential: Denied SI/HI  Potential for aggression no   Sensorium:  person, place and time/date   Cognition:  grossly intact   Consciousness:  alert and awake    Recent and Remote Memory fair   Attention: attention span appeared shorter than expected for age   Insight:  Partial   Judgment: Partial   Gait/Station: Did not assess   Motor Activity: no abnormal movements     Progress Toward Goals:  Slow progression    Recommended Treatment: Continue with group therapy, milieu therapy and occupational therapy  1   Continue current medications  2  Continue following CBC with diff    Risks, benefits and possible side effects of Medications:   Risks, benefits, and possible side effects of medications explained to patient and patient verbalizes understanding        Kusum Pirnce PA-C

## 2019-12-22 NOTE — PLAN OF CARE
Problem: Alteration in Thoughts and Perception  Goal: Agree to be compliant with medication regime, as prescribed and report medication side effects  Description  Interventions:  - Offer appropriate PRN medication and supervise ingestion; conduct aims, as needed   Outcome: Progressing  Goal: Attend and participate in unit activities, including therapeutic, recreational, and educational groups  Description  Interventions:  - Provide therapeutic and educational activities daily, encourage attendance and participation, and document same in the medical record     CERTIFIED PEER SPECIALIST INTERVENTIONS:    Complete peer assessment with patient to assess their needs and identify their goals to complete while in the recovery program as well as once discharged into the community  Patient will complete WRAP Plan, Crisis Plan and 5 Life Domains  Patient will attend 50% of groups offered on the unit  Patient will complete a goal card weekly  Outcome: Progressing  Goal: Complete daily ADLs, including personal hygiene independently, as able  Description  Interventions:  - Observe, teach, and assist patient with ADLS  - Monitor and promote a balance of rest/activity, with adequate nutrition and elimination   Outcome: Collette Henry remains isolative to her room, only coming out for meals  Appetite improving, no somatic complaints voiced this shift except for increased fatigue  She did however attend community meeting but again stated fatigue as her reasoning to why she could attend other groups  Compliant with her medications and pleasant during interactions but unaccepting of staff encouragement regarding hygiene  Currently remains in her room  Will continue to monitor

## 2019-12-22 NOTE — PROGRESS NOTES
Sleeping with no issues noted  O2 on at 1L/NC, no resp distress noted  All precautions in place and maintained at this time   Monitoring continues

## 2019-12-23 NOTE — PLAN OF CARE
Problem: Ineffective Coping  Goal: Demonstrates healthy coping skills  Outcome: Progressing     Problem: Alteration in Thoughts and Perception  Goal: Attend and participate in unit activities, including therapeutic, recreational, and educational groups  Description  Interventions:  - Provide therapeutic and educational activities daily, encourage attendance and participation, and document same in the medical record     CERTIFIED PEER SPECIALIST INTERVENTIONS:    Complete peer assessment with patient to assess their needs and identify their goals to complete while in the recovery program as well as once discharged into the community  Patient will complete WRAP Plan, Crisis Plan and 5 Life Domains  Patient will attend 50% of groups offered on the unit  Patient will complete a goal card weekly  12/23/2019 1216 by Alison Regalado, RN  Outcome: Progressing  12/23/2019 1215 by Alison Regalado, RN  Outcome: Progressing    Individual has been visible for structured activities, but quickly retreats back to room  She participated in programming, attended Goal group and IMR  There have not been any complaints of anxiety this shift  No somatic complaints  Dr Angelito Estrada on the unit to see her this morning  She continues to decline surgery related to hiatal hernia  He will come in the new year to revisit this with her  Availability of staff made known  Will continue to monitor

## 2019-12-23 NOTE — ASSESSMENT & PLAN NOTE
Psychiatry Progress Note  Patient has made some progress in attending some of the groups and taking showers and wearing clean clothes twice a week but continues to express occasional was psychosomatic symptoms but overall she is doing better and is pleased to find out that she can still go back to the [facility] personal care home rather than to the [facility] hospital if she keeps up with her improvement a for a few more weeks in a row  She no longer voices any delusions that there may be a micro chip on 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   However she still tends to accuse certain staff periodically claiming that they are tampering with her spiral meter and oxygen concentrator    She is also looking forward to going on a therapeutic pass with his sister for the new year day   Current medications:    Current Facility-Administered Medications:     acetaminophen (TYLENOL) tablet 650 mg, 650 mg, Oral, Q6H PRN, Isidoro Gonzalez MD    albuterol (PROVENTIL HFA,VENTOLIN HFA) inhaler 2 puff, 2 puff, Inhalation, Q4H PRN, Isidoro Gonzalez MD, 2 puff at 10/11/19 0424    aluminum-magnesium hydroxide-simethicone (MYLANTA) 200-200-20 mg/5 mL oral suspension 15 mL, 15 mL, Oral, Q4H PRN, Isidoro Gonzalez MD    ammonium lactate (LAC-HYDRIN) 12 % lotion 1 application, 1 application, Topical, BID PRN, Isidoro Gonzalez MD    benzonatate (TESSALON PERLES) capsule 100 mg, 100 mg, Oral, TID PRN, Isidoro Gonzalez MD    benztropine (COGENTIN) injection 1 mg, 1 mg, Intramuscular, Q8H PRN, Isidoro Gonzalez MD    carbamide peroxide (DEBROX) 6 5 % otic solution 5 drop, 5 drop, Left Ear, BID, Rona Roman MD, 5 drop at 12/22/19 0827    cloZAPine (CLOZARIL) tablet 25 mg, 25 mg, Oral, BID, Isidoro Gonzalez MD, 25 mg at 12/22/19 2146    cloZAPine (CLOZARIL) tablet 50 mg, 50 mg, Oral, BID, Isidoro Gonzalez MD, 50 mg at 12/22/19 2146    EPINEPHrine PF (ADRENALIN) 1 mg/mL injection 0 15 mg, 0 15 mg, Intramuscular, Once PRN, Isidoro Gonzalez MD    fluticasone-vilanterol [facility] ELLIPTA) 200-25 MCG/INH inhaler 1 puff, 1 puff, Inhalation, Daily, Jeet Levine MD, 1 puff at 12/22/19 0826    ketotifen (ZADITOR) 0 025 % ophthalmic solution 1 drop, 1 drop, Right Eye, BID PRN, Jeet Levine MD    levothyroxine tablet 125 mcg, 125 mcg, Oral, Early Morning, Jeet Levine MD, 125 mcg at 12/23/19 0610    magnesium hydroxide (MILK OF MAGNESIA) 400 mg/5 mL oral suspension 30 mL, 30 mL, Oral, Daily PRN, Jeet Levine MD    montelukast (SINGULAIR) tablet 10 mg, 10 mg, Oral, HS, Jeet Levine MD, 10 mg at 12/15/19 2107    OLANZapine (ZyPREXA) IM injection 5 mg, 5 mg, Intramuscular, Q8H PRN, Jeet Levine MD    OLANZapine (ZyPREXA) tablet 5 mg, 5 mg, Oral, Q8H PRN, Jeet Levine MD    ondansetron (ZOFRAN-ODT) dispersible tablet 4 mg, 4 mg, Oral, Q6H PRN, Jeet Levine MD, 4 mg at 12/09/19 1757    pantoprazole (PROTONIX) EC tablet 40 mg, 40 mg, Oral, Early Morning, Jeet Levine MD, 40 mg at 12/23/19 0610    polyethylene glycol (MIRALAX) packet 17 g, 17 g, Oral, Daily PRN, Jeet Levine MD    polyvinyl alcohol (LIQUIFILM TEARS) 1 4 % ophthalmic solution 1 drop, 1 drop, Both Eyes, Q3H PRN, Jeet Levine MD    sertraline (ZOLOFT) tablet 50 mg, 50 mg, Oral, BID, Jeet Levine MD, 50 mg at 12/22/19 2146    sucralfate (CARAFATE) oral suspension 1,000 mg, 1,000 mg, Oral, BID, Cat Torres MD, 1,000 mg at 12/23/19 0610    theophylline (JEF-24) 24 hr capsule 200 mg, 200 mg, Oral, Daily, Jeet Levine MD, 200 mg at 12/22/19 6539    tiotropium [facility]) capsule for inhaler 18 mcg, 18 mcg, Inhalation, Daily, Jeet Levine MD, 18 mcg at 12/22/19 2396    traZODone (DESYREL) tablet 25 mg, 25 mg, Oral, HS PRN, Jeet Levine MD  Justification if on more than two antipsychotics:  Only on his clozapine  Side effects if any:  None    Risks , benefits, side effects and precautions of medications discussed with patient and reminded patient to let us know any problems with medications     Objective:     Vital Signs:  Vitals:    12/22/19 1600 12/22/19 2000 12/23/19 0730 12/23/19 0732   BP: 96/55 98/84 113/70 110/61   BP Location: Left arm Left arm Left arm Left arm   Pulse: 71 92 84 70   Resp: 16 18 18    Temp: (!) 96 9 °F (36 1 °C) 98 4 °F (36 9 °C) 97 7 °F (36 5 °C)    TempSrc: Temporal Temporal Temporal    SpO2: 90% 91%     Weight:       Height:         Appearance:  age appropriate, casually dressed and overweight older than stated age,    Behavior:  evasive and guarded but less suspicious and still preoccupied with no psychosomatic complaints today, friendly polite and pleasant   Speech:  normal pitch and normal volume but circumstantial and tangential with stilted speech    Mood:  anxious and dysthymic   Affect:  mood-congruent, elated and entitled anxious and irritated and sad at times but pleasant today    Thought Process:  goal directed and illogical slightly pressured and tangential and talks as if in a court of law   Thought Content:    No current suicidal homicidal thoughts intent or plans reported  No phobias obsessions or compulsions reported  Still accusatory  to certain staff as if paranoid  No overt delusions elicited otherwise   Perceptual Disturbances: None and does not appear responding to internal stimuli at times   Risk Potential: Tendency to not care for herself    Sensorium:  person, place, time/date, situation, day of week, month of year and time   Cognition:  grossly intact with no deficits in recent or remote memory or immediate recall   Consciousness:  alert and awake    Attention: Intact concentration and attention span   Intellect: Considered to be at least of average intelligence   Insight:  limited but improving   Judgment: improving      Motor Activity: no abnormal movements         Recent Labs:  Results Reviewed     None          I/O Past 24 hours:  No intake/output data recorded  No intake/output data recorded          Assessment / Plan:     Schizoaffective disorder, bipolar type [facility]      Reason for continued inpatient care:  Because of underlying paranoia and inability to care for herself on her own and multiple somatic complaints  Acceptance by patient:  Accepting  Regan Keita in recovery:  About living in same personal care home at the [place] once she feels better  Understanding of medications :  Has some understanding  Involved in reintegration process: On off unit privileges now  Trusting in relatoinship with psychiatrist:  Trusting    Recommended Treatment:    Medication changes:  1) none today  Non-pharmacological treatments  1) continue with  individual therapy group therapy, milieu therapy and occupational therapy and milieu therapy involving multidisciplinary team approach with psychotherapist, case management, nursing, peer support services, art therapist etc using recovery principles and psycho-education about accepting illness and need for treatment   2) encourage to cooperate with behavior plan  3) encourage to attend to ADLs like taking showers and wearing clean clothes   4) Encourage to attend groups   5) see how she does on off unit privileges  and expectations discussed with her and she did verbalize an understanding  Safety  1) Safety/communication plan established targeting dynamic risk factors above  Discharge Plan most likely back to the a:  Personal care boarding home with [facility] services once her delusions are managed and mood becomes more stabilized and she attends to her ADLs she becomes more receptive to treatment compliance but she has not shown any improvement in the last 5 months since she has been on the unit and hence the referral needs to be initiated for the  [address] [place] [address] / Coordination of Care    Total floor / unit time spent today 15 minutes  Greater than 50% of total time was spent with the patient and / or family counseling and / or coordination of care  A description of the counseling / coordination of care       Patient's Rights, confidentiality and exceptions to confidentiality, use of automated medical record, Jeb Patrick staff access to medical record, and consent to treatment reviewed      Jeffrey Gallegos MD

## 2019-12-23 NOTE — PLAN OF CARE
Problem: Alteration in Thoughts and Perception  Goal: Verbalize thoughts and feelings  Description  Interventions:  - Promote a nonjudgmental and trusting relationship with the patient through active listening and therapeutic communication  - Assess patient's level of functioning, behavior and potential for risk  - Engage patient in 1 on 1 interactions for a minimum of 15 minutes each session  - Encourage patient to express fears, feelings, frustrations, and discuss symptoms    - Adel patient to reality, help patient recognize reality-based thinking   - Administer medications as ordered and assess for potential side effects  - Provide the patient education related to the signs and symptoms of the illness and desired effects of prescribed medications  Outcome: Progressing  Goal: Agree to be compliant with medication regime, as prescribed and report medication side effects  Description  Interventions:  - Offer appropriate PRN medication and supervise ingestion; conduct aims, as needed   Outcome: Progressing  Goal: Attend and participate in unit activities, including therapeutic, recreational, and educational groups  Description  Interventions:  - Provide therapeutic and educational activities daily, encourage attendance and participation, and document same in the medical record     CERTIFIED PEER SPECIALIST INTERVENTIONS:    Complete peer assessment with patient to assess their needs and identify their goals to complete while in the recovery program as well as once discharged into the community  Patient will complete WRAP Plan, Crisis Plan and 5 Life Domains  Patient will attend 50% of groups offered on the unit  Patient will complete a goal card weekly      Outcome: Progressing     Problem: Alteration in Thoughts and Perception  Goal: Complete daily ADLs, including personal hygiene independently, as able  Description  Interventions:  - Observe, teach, and assist patient with ADLS  - Monitor and promote a balance of rest/activity, with adequate nutrition and elimination   Outcome: Not Progressing     Problem: Depression  Goal: Refrain from isolation  Description  Interventions:  - Develop a trusting relationship   - Encourage socialization   Outcome: Not Progressing     [address] Sania Higginbotham has been isolative in her room in bed, though, came out for meal, PM Group, HS snack, scheduled medicines, sat up after meals for 45"-1hr  For meal ate mashed potato, stuffing, dessert, Ensure  For HS snack had Cheetos, cookies & milk  Took all scheduled medicines except the Singulair  Used the Beem achieving volumes of 1100-1250ml  The  reports that Sania Higginbotham initially felt on 'verge of an anxiety attack', was expressing paranoid ideation that 'someone was wishing such an attack' upon her, having the 'fight or flight response'  But, was redirectable, calmed & performed well in group  Wearing now her QHS humidified nasal O2 @ 1L for bed

## 2019-12-23 NOTE — PROGRESS NOTES
12/23/19 1100   Activity/Group Checklist   Group   (IMR/Holiday Stress )   Attendance Attended   Attendance Duration (min) 46-60   Interactions Interacted appropriately   Affect/Mood Appropriate;Normal range   Goals Achieved Identified feelings; Able to listen to others; Able to engage in interactions; Able to self-disclose

## 2019-12-23 NOTE — PROGRESS NOTES
12/23/19 0800   Team Meeting   Meeting Type Daily Rounds   Team Members Present   Team Members Present Physician;Nurse;; Other (Discipline and Name)   Physician Team Member Dr Brisa Arana Team Member Stan Pinzon, RN   Care Management Team Member Brenda Baron   Other (Discipline and Name) Damon Farr LCSW     Patient was isolative and reported to staff she was on the verge of an anxiety attack yesterday  [id number] [facility] staff reports she needs to be seen by podiatry  [place]

## 2019-12-23 NOTE — PROGRESS NOTES
Patient states she is not interested in surgery at this time  I told her I would see her next year  She agreed

## 2019-12-23 NOTE — PROGRESS NOTES
Progress Note - Violetta [employer] 1955, 60 y o  female MRN: [id number]    Unit/Bed#: [facility] 110-02 Encounter: [id number]    Primary Care Provider: Praveen Barrios PA-C   Date and time admitted to hospital: 7/23/2019  5:30 PM        * Schizoaffective disorder, bipolar type [facility])  Assessment & Plan  Psychiatry Progress Note  Patient has made some progress in attending some of the groups and taking showers and wearing clean clothes twice a week but continues to express occasional was psychosomatic symptoms but overall she is doing better and is pleased to find out that she can still go back to the [facility] personal care home rather than to the [facility] hospital if she keeps up with her improvement a for a few more weeks in a row  She no longer voices any delusions that there may be a micro chip on 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   However she still tends to accuse certain staff periodically claiming that they are tampering with her spiral meter and oxygen concentrator    She is also looking forward to going on a therapeutic pass with his sister for the new year day   Current medications:    Current Facility-Administered Medications:     acetaminophen (TYLENOL) tablet 650 mg, 650 mg, Oral, Q6H PRN, Felisa Florence MD    albuterol (PROVENTIL HFA,VENTOLIN HFA) inhaler 2 puff, 2 puff, Inhalation, Q4H PRN, Felisa Florence MD, 2 puff at 10/11/19 0424    aluminum-magnesium hydroxide-simethicone (MYLANTA) 200-200-20 mg/5 mL oral suspension 15 mL, 15 mL, Oral, Q4H PRN, Felisa Florence MD    ammonium lactate (LAC-HYDRIN) 12 % lotion 1 application, 1 application, Topical, BID PRN, Felisa Florence MD    benzonatate (TESSALON PERLES) capsule 100 mg, 100 mg, Oral, TID PRN, Felisa Florence MD    benztropine (COGENTIN) injection 1 mg, 1 mg, Intramuscular, Q8H PRN, Felisa Florence MD    carbamide peroxide (DEBROX) 6 5 % otic solution 5 drop, 5 drop, Left Ear, BID, Rona Davis MD, 5 drop at 12/22/19 0892    cloZAPine (CLOZARIL) tablet 25 mg, 25 mg, Oral, BID, Tree Madden MD, 25 mg at 12/22/19 2146    cloZAPine (CLOZARIL) tablet 50 mg, 50 mg, Oral, BID, Tree Madden MD, 50 mg at 12/22/19 2146    EPINEPHrine PF (ADRENALIN) 1 mg/mL injection 0 15 mg, 0 15 mg, Intramuscular, Once PRN, Tree Madden MD    fluticasone-vilanterol (BREO ELLIPTA) 200-25 MCG/INH inhaler 1 puff, 1 puff, Inhalation, Daily, Tree Madden MD, 1 puff at 12/22/19 0826    ketotifen (ZADITOR) 0 025 % ophthalmic solution 1 drop, 1 drop, Right Eye, BID PRN, Tree Madden MD    levothyroxine tablet 125 mcg, 125 mcg, Oral, Early Morning, Tree Madden MD, 125 mcg at 12/23/19 0610    magnesium hydroxide (MILK OF MAGNESIA) 400 mg/5 mL oral suspension 30 mL, 30 mL, Oral, Daily PRN, Tree Madden MD    montelukast (SINGULAIR) tablet 10 mg, 10 mg, Oral, HS, Tree Madden MD, 10 mg at 12/15/19 2107    OLANZapine (ZyPREXA) IM injection 5 mg, 5 mg, Intramuscular, Q8H PRN, Tree Madden MD    OLANZapine (ZyPREXA) tablet 5 mg, 5 mg, Oral, Q8H PRN, Tree Madden MD    ondansetron (ZOFRAN-ODT) dispersible tablet 4 mg, 4 mg, Oral, Q6H PRN, Tree Madden MD, 4 mg at 12/09/19 1757    pantoprazole (PROTONIX) EC tablet 40 mg, 40 mg, Oral, Early Morning, Tree Madden MD, 40 mg at 12/23/19 0610    polyethylene glycol (MIRALAX) packet 17 g, 17 g, Oral, Daily PRN, Tree Madden MD    polyvinyl alcohol (LIQUIFILM TEARS) 1 4 % ophthalmic solution 1 drop, 1 drop, Both Eyes, Q3H PRN, Tree Madden MD    sertraline (ZOLOFT) tablet 50 mg, 50 mg, Oral, BID, Tree Madden MD, 50 mg at 12/22/19 2146    sucralfate (CARAFATE) oral suspension 1,000 mg, 1,000 mg, Oral, BID, Cat Torres MD, 1,000 mg at 12/23/19 0610    theophylline (JEF-24) 24 hr capsule 200 mg, 200 mg, Oral, Daily, Tree Madden MD, 200 mg at 12/22/19 0826    tiotropium [facility]) capsule for inhaler 18 mcg, 18 mcg, Inhalation, Daily, Tree Madden MD, 18 mcg at 12/22/19 0826    traZODone (DESYREL) tablet 25 mg, 25 mg, Oral, HS PRN, Laina Villegas Kari Carranza MD  Justification if on more than two antipsychotics:  Only on his clozapine  Side effects if any:  None    Risks , benefits, side effects and precautions of medications discussed with patient and reminded patient to let us know any problems with medications     Objective:     Vital Signs:  Vitals:    12/22/19 1600 12/22/19 2000 12/23/19 0730 12/23/19 0732   BP: 96/55 98/84 113/70 110/61   BP Location: Left arm Left arm Left arm Left arm   Pulse: 71 92 84 70   Resp: 16 18 18    Temp: (!) 96 9 °F (36 1 °C) 98 4 °F (36 9 °C) 97 7 °F (36 5 °C)    TempSrc: Temporal Temporal Temporal    SpO2: 90% 91%     Weight:       Height:         Appearance:  age appropriate, casually dressed and overweight older than stated age,    Behavior:  evasive and guarded but less suspicious and still preoccupied with no psychosomatic complaints today, friendly polite and pleasant   Speech:  normal pitch and normal volume but circumstantial and tangential with stilted speech    Mood:  anxious and dysthymic   Affect:  mood-congruent, elated and entitled anxious and irritated and sad at times but pleasant today    Thought Process:  goal directed and illogical slightly pressured and tangential and talks as if in a court of law   Thought Content:    No current suicidal homicidal thoughts intent or plans reported  No phobias obsessions or compulsions reported  Still accusatory  to certain staff as if paranoid    No overt delusions elicited otherwise   Perceptual Disturbances: None and does not appear responding to internal stimuli at times   Risk Potential: Tendency to not care for herself    Sensorium:  person, place, time/date, situation, day of week, month of year and time   Cognition:  grossly intact with no deficits in recent or remote memory or immediate recall   Consciousness:  alert and awake    Attention: Intact concentration and attention span   Intellect: Considered to be at least of average intelligence   Insight:  limited but improving   Judgment: improving      Motor Activity: no abnormal movements         Recent Labs:  Results Reviewed     None          I/O Past 24 hours:  No intake/output data recorded  No intake/output data recorded  Assessment / Plan:     Schizoaffective disorder, bipolar type ([address] )      Reason for continued inpatient care:  Because of underlying paranoia and inability to care for herself on her own and multiple somatic complaints  Acceptance by patient:  Accepting  Alissa Neetu in recovery:  About living in same personal care home at the [place] once she feels better  Understanding of medications :  Has some understanding  Involved in reintegration process: On off unit privileges now  Trusting in relatoinship with psychiatrist:  Trusting    Recommended Treatment:    Medication changes:  1) none today  Non-pharmacological treatments  1) continue with  individual therapy group therapy, milieu therapy and occupational therapy and milieu therapy involving multidisciplinary team approach with psychotherapist, case management, nursing, peer support services, art therapist etc using recovery principles and psycho-education about accepting illness and need for treatment   2) encourage to cooperate with behavior plan  3) encourage to attend to ADLs like taking showers and wearing clean clothes   4) Encourage to attend groups   5) see how she does on off unit privileges  and expectations discussed with her and she did verbalize an understanding  Safety  1) Safety/communication plan established targeting dynamic risk factors above    Discharge Plan most likely back to the a:  Personal care boarding home with act services once her delusions are managed and mood becomes more stabilized and she attends to her ADLs she becomes more receptive to treatment compliance but she has not shown any improvement in the last 5 months since she has been on the unit and hence the referral needs to be initiated for the  [facility]    Counseling / Coordination of Care    Total floor / unit time spent today 15 minutes  Greater than 50% of total time was spent with the patient and / or family counseling and / or coordination of care  A description of the counseling / coordination of care  Patient's Rights, confidentiality and exceptions to confidentiality, use of automated medical record, Jeb Patrick staff access to medical record, and consent to treatment reviewed      Amina Aparicio MD

## 2019-12-23 NOTE — PROGRESS NOTES
Sleeping at this time  No distress noted  O2 via NC on at 1L, no resp distress noted  All precatuions in place and maintained at this time

## 2019-12-24 NOTE — PROGRESS NOTES
12/24/19 1595   Team Meeting   Meeting Type Tx Team Meeting   Initial Conference Date 12/24/19   Next Conference Date 12/31/19   Team Members Present   Team Members Present Physician;Nurse;; Other (Discipline and Name)   Physician Team Member Dr Darrin Khan Team Member Arturo Botello, RN   Care Management Team Member Brenda Reyes   Other (Discipline and Name) Madeline Vance, JONATHON   Patient/Family Present   Patient Present Yes   Patient's Family Present No     Patient attended treatment team meeting this morning without a self assessment completed  Patient attended 72% of groups offered last week  Patient has been more visible on the unit and attending more groups  She reports she would like to go on pass with her sister for New Years Day, however, team reminded her that she first must go off unit with staff then off grounds with staff before she can do a pass with her sister  Patient is concerned about going off unit without oxygen  Respiratory consult was ordered  Team and patient completed risk assessment and the patient did not verbalize any desire to elope from the program  Patient verbalized understanding of consequences of eloping from treatment while on a commitment  Patient verbalized no further questions or concerns at the conclusion of the meeting

## 2019-12-24 NOTE — PLAN OF CARE
Problem: Depression  Goal: Refrain from isolation  Description  Interventions:  - Develop a trusting relationship   - Encourage socialization   Outcome: Progressing     Problem: Alteration in Thoughts and Perception  Goal: Attend and participate in unit activities, including therapeutic, recreational, and educational groups  Description  Interventions:  - Provide therapeutic and educational activities daily, encourage attendance and participation, and document same in the medical record     CERTIFIED PEER SPECIALIST INTERVENTIONS:    Complete peer assessment with patient to assess their needs and identify their goals to complete while in the recovery program as well as once discharged into the community  Patient will complete WRAP Plan, Crisis Plan and 5 Life Domains  Patient will attend 50% of groups offered on the unit  Patient will complete a goal card weekly  Outcome: Jannette Smith has been isolative to room, pt was encouraged to spend more time socializing with staff and peers out of room:Individual attended 1 out of 3 groups    When approached by this writer pt stated she was not feeling well today, this writer encouraged her to spend time ambulating around unit  Pt continued to have several reason why she could not come out of room  Pt shows no s/s of discomfort or distress  And is able to make needs known  Will continue to monitor

## 2019-12-24 NOTE — PROGRESS NOTES
12/24/19 1100   Activity/Group Checklist   Group   ([facility] )   Attendance Attended   Attendance Duration (min) 46-60   Interactions Interacted appropriately   Affect/Mood Normal range   Goals Achieved Able to listen to others

## 2019-12-24 NOTE — PLAN OF CARE
Problem: PAIN - ADULT  Goal: Verbalizes/displays adequate comfort level or baseline comfort level  Description  Interventions:  - Encourage patient to monitor pain and request assistance  - Assess pain using appropriate pain scale  - Administer analgesics based on type and severity of pain and evaluate response  - Implement non-pharmacological measures as appropriate and evaluate response  - Consider cultural and social influences on pain and pain management  - Notify physician/advanced practitioner if interventions unsuccessful or patient reports new pain  Outcome: Progressing     Problem: SAFETY ADULT  Goal: Patient will remain free of falls  Description  INTERVENTIONS:  - Assess patient frequently for physical needs  -  Identify cognitive and physical deficits and behaviors that affect risk of falls    -  Se[place]our fall precautions as indicated by assessment   - Educate patient/family on patient safety including physical limitations  - Instruct patient to call for assistance with activity based on assessment  - Modify environment to reduce risk of injury  - Consider OT/PT consult to assist with strengthening/mobility  Outcome: Progressing     Problem: RESPIRATORY - ADULT  Goal: Achieves optimal ventilation and oxygenation  Description  INTERVENTIONS:  - Assess for changes in respiratory status  - Assess for changes in mentation and behavior  - Position to facilitate oxygenation and minimize respiratory effort  - Oxygen administration by appropriate delivery method based on oxygen saturation (per order) or ABGs  - Initiate smoking cessation education as indicated  - Encourage broncho-pulmonary hygiene including cough, deep breathe, Incentive Spirometry  - Assess the need for suctioning and aspirate as needed  - Assess and instruct to report SOB or any respiratory difficulty  - Respiratory Therapy support as indicated  Outcome: Progressing     Problem: SLEEP DISTURBANCE  Goal: Will exhibit normal sleeping pattern  Description  Interventions:  -  Assess the patients sleep pattern, noting recent changes  - Administer medication as ordered  - Decrease environmental stimuli, including noise, as appropriate during the night  - Encourage the patient to actively participate in unit groups and or exercise during the day to enhance ability to achieve adequate sleep at night  - Assess the patient, in the morning, encouraging a description of sleep experience  Outcome: Progressing    Maggie maintained on ongoing fall and SAFE precaution   Laying in bed with eyes closed, breath even and unlabored   On O2 with humidifier @1L/m via nasal cannula   Q 15 minutes rounding   No somatic complaint overnight  No PRN needed for sleep aid   No indication of pain or discomfort   No respiratory distress   Will continue to monitor

## 2019-12-24 NOTE — PROGRESS NOTES
Progress Note - Violetta Villasenor 1955, 60 y o  female MRN: [id number]    [facility] Encounter: [id number]    Primary Care Provider: Trish Alfaro PA-C   Date and time admitted to hospital: 7/23/2019  5:30 PM        * Schizoaffective disorder, bipolar type [facility])  Assessment & Plan  Psychiatry Progress Note  Patient has made some progress in attending more than 70% of  the groups last week and and taking showers twice a week  and wearing clean clothes twice a week   She did not voice any psychosomatic symptoms today and is pleased to find out that she can still go back to the [facility] rather than to the [facility] if she keeps up with her improvement  for a few more weeks in a row  She no longer voices any delusions that there may be a micro chip on 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 in several weeks now   However she still tends to accuse certain staff periodically claiming that they are tampering with her spirometer and oxygen concentrator off and on  She is also looking forward to going on a therapeutic pass with his sister for the new year day but reminded her that she needs to show she can go off unit and off grounds first with staff      Current medications:    Current Facility-Administered Medications:     acetaminophen (TYLENOL) tablet 650 mg, 650 mg, Oral, Q6H PRN, Manuel Copeland MD    albuterol (PROVENTIL HFA,VENTOLIN HFA) inhaler 2 puff, 2 puff, Inhalation, Q4H PRN, Manuel Copeland MD, 2 puff at 10/11/19 0424    aluminum-magnesium hydroxide-simethicone (MYLANTA) 200-200-20 mg/5 mL oral suspension 15 mL, 15 mL, Oral, Q4H PRN, Manuel Copeland MD    ammonium lactate (LAC-HYDRIN) 12 % lotion 1 application, 1 application, Topical, BID PRN, Manuel Copeland MD    benzonatate (TESSALON PERLES) capsule 100 mg, 100 mg, Oral, TID PRN, Manuel Copeland MD    benztropine (COGENTIN) injection 1 mg, 1 mg, Intramuscular, Q8H PRN, Manuel Copeland MD    carbamide peroxide [place] [facility]) 6 5 % otic solution 5 drop, 5 drop, Left Ear, BID, Rona Gaston MD, 5 drop at 12/23/19 2132    cloZAPine (CLOZARIL) tablet 25 mg, 25 mg, Oral, BID, Christian Bennett MD, 25 mg at 12/23/19 2132    cloZAPine (CLOZARIL) tablet 50 mg, 50 mg, Oral, BID, Christian Bennett MD, 50 mg at 12/23/19 2132    EPINEPHrine PF (ADRENALIN) 1 mg/mL injection 0 15 mg, 0 15 mg, Intramuscular, Once PRN, Christian Bennett MD    fluticasone-vilanterol (BREO ELLIPTA) 200-25 MCG/INH inhaler 1 puff, 1 puff, Inhalation, Daily, Christian Bennett MD, 1 puff at 12/24/19 0855    ketotifen (ZADITOR) 0 025 % ophthalmic solution 1 drop, 1 drop, Right Eye, BID PRN, Christian Bennett MD    levothyroxine tablet 125 mcg, 125 mcg, Oral, Early Morning, Christian Bennett MD, 125 mcg at 12/24/19 0601    magnesium hydroxide (MILK OF MAGNESIA) 400 mg/5 mL oral suspension 30 mL, 30 mL, Oral, Daily PRN, Christian Bennett MD    montelukast (SINGULAIR) tablet 10 mg, 10 mg, Oral, HS, Christian Bennett MD, 10 mg at 12/15/19 2107    OLANZapine (ZyPREXA) IM injection 5 mg, 5 mg, Intramuscular, Q8H PRN, Christian Bennett MD    OLANZapine (ZyPREXA) tablet 5 mg, 5 mg, Oral, Q8H PRN, Christian Bennett MD    ondansetron (ZOFRAN-ODT) dispersible tablet 4 mg, 4 mg, Oral, Q6H PRN, Christian Bennett MD, 4 mg at 12/09/19 1757    pantoprazole (PROTONIX) EC tablet 40 mg, 40 mg, Oral, Early Morning, Christian Bennett MD, 40 mg at 12/24/19 0602    polyethylene glycol (MIRALAX) packet 17 g, 17 g, Oral, Daily PRN, Christian Bennett MD    polyvinyl alcohol (LIQUIFILM TEARS) 1 4 % ophthalmic solution 1 drop, 1 drop, Both Eyes, Q3H PRN, Christian Bennett MD    sertraline (ZOLOFT) tablet 50 mg, 50 mg, Oral, BID, Christian Bennett MD, 50 mg at 12/24/19 0855    sucralfate (CARAFATE) oral suspension 1,000 mg, 1,000 mg, Oral, BID, Cat Torres MD, 1,000 mg at 12/24/19 0601    theophylline (JEF-24) 24 hr capsule 200 mg, 200 mg, Oral, Daily, Christian Bennett MD, 200 mg at 12/24/19 0855    tiotropium (SPIRIVA) capsule for inhaler 18 mcg, 18 mcg, Inhalation, Daily, Deep Durand MD, 18 mcg at 12/24/19 0855    traZODone (DESYREL) tablet 25 mg, 25 mg, Oral, HS PRN, Deep Durand MD  Justification if on more than two antipsychotics:  Only on his clozapine  Side effects if any:  None    Risks , benefits, side effects and precautions of medications discussed with patient and reminded patient to let us know any problems with medications     Objective:     Vital Signs:  Vitals:    12/23/19 0732 12/23/19 1641 12/23/19 1952 12/24/19 0730   BP: 110/61 121/72 103/64 105/61   BP Location: Left arm Right arm Right arm Left arm   Pulse: 70 91 92 77   Resp:  16 16 18   Temp:  97 9 °F (36 6 °C) (!) 97 3 °F (36 3 °C) 99 4 °F (37 4 °C)   TempSrc:  Temporal Temporal Temporal   SpO2:  90% 91%    Weight:       Height:         Appearance:  age appropriate, casually dressed and overweight older than stated age, better groomed wearing clean cloths   Behavior:  evasive and guarded with no psychosomatic complaints, friendly polite and pleasant today   Speech:  normal pitch and normal volume but circumstantial and tangential with stilted speech    Mood:  anxious and dysthymic   Affect:  mood-congruent, elated and entitled anxious and irritated and sad at times but pleasant today    Thought Process:  goal directed and illogical slightly pressured and tangential and talks as if in a court of law   Thought Content:    No current suicidal homicidal thoughts intent or plans reported  No phobias obsessions or compulsions reported  Still accusatory  to certain staff as if paranoid but less often    No overt delusions elicited today   Perceptual Disturbances: None and does not appear responding to internal stimuli    Risk Potential: Tendency to not care for herself    Sensorium:  person, place, time/date, situation, day of week, month of year and time   Cognition:  grossly intact with no deficits in recent or remote memory or immediate recall   Consciousness:  alert and awake    Attention: Intact concentration and attention span   Intellect: Considered to be at least of average intelligence   Insight:  limited but improving   Judgment: improving      Motor Activity: no abnormal movements         Recent Labs:  Results Reviewed     None          I/O Past 24 hours:  No intake/output data recorded  No intake/output data recorded  Assessment / Plan:     Schizoaffective disorder, bipolar type ([facility] [address] )      Reason for continued inpatient care:  Because of underlying paranoia and inability to care for herself on her own and multiple somatic complaints  Acceptance by patient:  Accepting  Jabari Marie in recovery:  About living in same personal care home at the [place] once she feels better  Understanding of medications :  Has some understanding  Involved in reintegration process: On off unit privileges now  Trusting in relatoinship with psychiatrist:  Trusting    Recommended Treatment:    Medication changes:  1) none today  Non-pharmacological treatments  1) continue with  individual therapy group therapy, milieu therapy and occupational therapy and milieu therapy involving multidisciplinary team approach with psychotherapist, case management, nursing, peer support services, art therapist etc using recovery principles and psycho-education about accepting illness and need for treatment   2) encourage to cooperate with behavior plan  3) encourage to attend to ADLs like taking showers and wearing clean clothes   4) Encourage to attend groups   5) see how she does on off unit privileges and encourage to go off unit with staff escort  and expectations discussed with her and she did verbalize an understanding, consult respiratory to see if she needs portable oxygen  Safety  1) Safety/communication plan established targeting dynamic risk factors above    Discharge Plan most likely back to the a:  Personal care boarding home with act services once her delusions are managed and mood becomes more stabilized and she attends to her ADLs she becomes more receptive to treatment compliance but she has not shown any improvement in the last 5 months since she has been on the unit and hence the referral needs to be initiated for the  [address] [place] [address] / Coordination of Care    Total floor / unit time spent today 15 minutes  Greater than 50% of total time was spent with the patient and / or family counseling and / or coordination of care  A description of the counseling / coordination of care  Patient's Rights, confidentiality and exceptions to confidentiality, use of automated medical record, [address] [place] [address] staff access to medical record, and consent to treatment reviewed      Mynor Terry MD

## 2019-12-24 NOTE — PLAN OF CARE
Problem: Alteration in Thoughts and Perception  Goal: Verbalize thoughts and feelings  Description  Interventions:  - Promote a nonjudgmental and trusting relationship with the patient through active listening and therapeutic communication  - Assess patient's level of functioning, behavior and potential for risk  - Engage patient in 1 on 1 interactions for a minimum of 15 minutes each session  - Encourage patient to express fears, feelings, frustrations, and discuss symptoms    - [place] patient to reality, help patient recognize reality-based thinking   - Administer medications as ordered and assess for potential side effects  - Provide the patient education related to the signs and symptoms of the illness and desired effects of prescribed medications  Outcome: Progressing  Goal: Agree to be compliant with medication regime, as prescribed and report medication side effects  Description  Interventions:  - Offer appropriate PRN medication and supervise ingestion; conduct aims, as needed   Outcome: Progressing  Goal: Complete daily ADLs, including personal hygiene independently, as able  Description  Interventions:  - Observe, teach, and assist patient with ADLS  - Monitor and promote a balance of rest/activity, with adequate nutrition and elimination   Outcome: Progressing     Problem: Depression  Goal: Refrain from isolation  Description  Interventions:  - Develop a trusting relationship   - Encourage socialization   Outcome: Progressing     Problem: RESPIRATORY - ADULT  Goal: Achieves optimal ventilation and oxygenation  Description  INTERVENTIONS:  - Assess for changes in respiratory status  - Assess for changes in mentation and behavior  - Position to facilitate oxygenation and minimize respiratory effort  - Oxygen administration by appropriate delivery method based on oxygen saturation (per order) or ABGs  - Initiate smoking cessation education as indicated  - Encourage broncho-pulmonary hygiene including cough, deep breathe, Incentive Spirometry  - Assess the need for suctioning and aspirate as needed  - Assess and instruct to report SOB or any respiratory difficulty  - Respiratory Therapy support as indicated  Outcome: Progressing     Problem: Nutrition/Hydration-ADULT  Goal: Nutrient/Hydration intake appropriate for improving, restoring or maintaining nutritional needs  Description  Monitor and assess patient's nutrition/hydration status for malnutrition  Collaborate with interdisciplinary team and initiate plan and interventions as ordered  Monitor patient's weight and dietary intake as ordered or per policy  Utilize nutrition screening tool and intervene as necessary  Determine patient's food preferences and provide high-protein, high-caloric foods as appropriate  INTERVENTIONS:  - Monitor oral intake, urinary output, labs, and treatment plans  - Assess nutrition and hydration status and recommend course of action  - Evaluate amount of meals eaten  - Assist patient with eating if necessary   - Allow adequate time for meals  - Recommend/ encourage appropriate diets, oral nutritional supplements, and vitamin/mineral supplements  - Order, calculate, and assess calorie counts as needed  - Recommend, monitor, and adjust tube feedings and TPN/PPN based on assessed needs  - Assess need for intravenous fluids  - Provide specific nutrition/hydration education as appropriate  - Include patient/family/caregiver in decisions related to nutrition  Outcome: Progressing     [address] has been visible in milieu much of shift; sitting out in dining room or in phone chair in [place] when it is not in use  She is pleasant, conversational, even social w/peers passing by  Initiated wanting to shower & shampoo "for Treatment Team tomorrow"  Was set up, given assistance w/hair  For meal ate all egg salad, most of scoop of ice cream, Ensure  For HS snack has 2 packs Oreo cookies & milk   Came for scheduled medicines, took all but the Singulair  Chatted w/sister on phone & empathizes w/sister's frenzy to clean, prepare house for [place], wishing could help  Used the [employer] reaching volumes of 1100-1250ml  Wearing now her QHS humidified nasal O2 @ 1L for bed

## 2019-12-24 NOTE — PLAN OF CARE
Problem: Individualized Interventions  Goal: Attend and participate in unit activities, including therapeutic, recreational, and educational groups  Description    PSYCHOTHERAPY INTERVENTIONS:    -Form therapeutic alliance to promote trust and safety within a therapeutic environment    -Engage in individual psychotherapy using evidence-based practices that are specific to individual needs   -Process barriers to community living with an emphasis on solution-based interventions   -Promote self-awareness and identity development   -Identify and process patterns of behavior that have led to decompensation and/or hospitalizations   -Attend psychoeducation groups with licensed psychotherapist to learn about Illness Management Recovery (IMR) concepts and enhance coping skills    -Practice effective communication with staff, peers, family, and community members  Participate in family sessions as necessary   -Encourage reality-based thought content by examining thought processes and cognitive distortions      -Work with treatment team in reintegration back into the community when appropriate  Outcome: Progressing    Therapist encouraged patient to come off the unit for a walk, as discussed in treatment team   Patient reported to feel nauseous and weak, but denies this being related to plans to go off the unit  Therapist reiterated the requirements patient needs to meet in order to go on a pass with her sister  Therapist will continue to try ot help patient psychosomatic anxiety and paranoia

## 2019-12-24 NOTE — PROGRESS NOTES
12/24/19 0900   Team Meeting   Meeting Type Daily Rounds   Team Members Present   Team Members Present Physician;Nurse;; Other (Discipline and Name)   Physician Team Member Dr Ileana Bro Team Member Jeff Everett RN   Care Management Team Member Brenda Lao   Other (Discipline and Name) Rand Stark, JER     Patient had a good appetite yesterday  Was able to shower and wash her hair yesterday as well  Somatic this morning  Slept

## 2019-12-24 NOTE — ASSESSMENT & PLAN NOTE
Psychiatry Progress Note  Patient has made some progress in attending more than 70% of  the groups last week and and taking showers twice a week  and wearing clean clothes twice a week   She did not voice any psychosomatic symptoms today and is pleased to find out that she can still go back to the [facility] care [place] rather than to the [facility] hospital if she keeps up with her improvement  for a few more weeks in a row  She no longer voices any delusions that there may be a micro chip on 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 in several weeks now   However she still tends to accuse certain staff periodically claiming that they are tampering with her spirometer and oxygen concentrator off and on  She is also looking forward to going on a therapeutic pass with his sister for the new year day but reminded her that she needs to show she can go off unit and off grounds first with staff      Current medications:    Current Facility-Administered Medications:     acetaminophen (TYLENOL) tablet 650 mg, 650 mg, Oral, Q6H PRN, Jennifer Taveras MD    albuterol (PROVENTIL HFA,VENTOLIN HFA) inhaler 2 puff, 2 puff, Inhalation, Q4H PRN, Jennifer Taveras MD, 2 puff at 10/11/19 0424    aluminum-magnesium hydroxide-simethicone (MYLANTA) 200-200-20 mg/5 mL oral suspension 15 mL, 15 mL, Oral, Q4H PRN, Jennifer Taveras MD    ammonium lactate (LAC-HYDRIN) 12 % lotion 1 application, 1 application, Topical, BID PRN, Jennifer Taveras MD    benzonatate (TESSALON PERLES) capsule 100 mg, 100 mg, Oral, TID PRN, Jennifer Taveras MD    benztropine (COGENTIN) injection 1 mg, 1 mg, Intramuscular, Q8H PRN, Jennifer Taveras MD    carbamide peroxide (DEBROX) 6 5 % otic solution 5 drop, 5 drop, Left Ear, BID, Rona Pérez MD, 5 drop at 12/23/19 2132    cloZAPine (CLOZARIL) tablet 25 mg, 25 mg, Oral, BID, Jennifer Taveras MD, 25 mg at 12/23/19 2132    cloZAPine (CLOZARIL) tablet 50 mg, 50 mg, Oral, BID, Jennifer Taveras MD, 50 mg at 12/23/19 2132    EPINEPHrine PF (ADRENALIN) 1 mg/mL injection 0 15 mg, 0 15 mg, Intramuscular, Once PRN, Roberto Shankar MD    fluticasone-vilanterol (BREO ELLIPTA) 200-25 MCG/INH inhaler 1 puff, 1 puff, Inhalation, Daily, Roberto Shankar MD, 1 puff at 12/24/19 0855    ketotifen (ZADITOR) 0 025 % ophthalmic solution 1 drop, 1 drop, Right Eye, BID PRN, Roberto Shankar MD    levothyroxine tablet 125 mcg, 125 mcg, Oral, Early Morning, Roberto Shankar MD, 125 mcg at 12/24/19 0601    magnesium hydroxide (MILK OF MAGNESIA) 400 mg/5 mL oral suspension 30 mL, 30 mL, Oral, Daily PRN, Roberto Shankar MD    montelukast (SINGULAIR) tablet 10 mg, 10 mg, Oral, HS, Roberto Shankar MD, 10 mg at 12/15/19 2107    OLANZapine (ZyPREXA) IM injection 5 mg, 5 mg, Intramuscular, Q8H PRN, Roberto Shankar MD    OLANZapine (ZyPREXA) tablet 5 mg, 5 mg, Oral, Q8H PRN, Roberto Shankar MD    ondansetron (ZOFRAN-ODT) dispersible tablet 4 mg, 4 mg, Oral, Q6H PRN, Roberto Shankar MD, 4 mg at 12/09/19 1757    pantoprazole (PROTONIX) EC tablet 40 mg, 40 mg, Oral, Early Morning, Roberto Shankar MD, 40 mg at 12/24/19 0602    polyethylene glycol (MIRALAX) packet 17 g, 17 g, Oral, Daily PRN, Roberto Shankar MD    polyvinyl alcohol (LIQUIFILM TEARS) 1 4 % ophthalmic solution 1 drop, 1 drop, Both Eyes, Q3H PRN, Roberto Shankar MD    sertraline (ZOLOFT) tablet 50 mg, 50 mg, Oral, BID, Roberto Shankar MD, 50 mg at 12/24/19 0855    sucralfate (CARAFATE) oral suspension 1,000 mg, 1,000 mg, Oral, BID, Cat Torres MD, 1,000 mg at 12/24/19 0601    theophylline (JEF-24) 24 hr capsule 200 mg, 200 mg, Oral, Daily, Roberto Shankar MD, 200 mg at 12/24/19 0855    tiotropium (SPIRIVA) capsule for inhaler 18 mcg, 18 mcg, Inhalation, Daily, Roberto Shankar MD, 18 mcg at 12/24/19 0855    traZODone (DESYREL) tablet 25 mg, 25 mg, Oral, HS PRN, Roberto Shankar MD  Justification if on more than two antipsychotics:  Only on his clozapine  Side effects if any:  None    Risks , benefits, side effects and precautions of medications discussed with patient and reminded patient to let us know any problems with medications     Objective:     Vital Signs:  Vitals:    12/23/19 0732 12/23/19 1641 12/23/19 1952 12/24/19 0730   BP: 110/61 121/72 103/64 105/61   BP Location: Left arm Right arm Right arm Left arm   Pulse: 70 91 92 77   Resp:  16 16 18   Temp:  97 9 °F (36 6 °C) (!) 97 3 °F (36 3 °C) 99 4 °F (37 4 °C)   TempSrc:  Temporal Temporal Temporal   SpO2:  90% 91%    Weight:       Height:         Appearance:  age appropriate, casually dressed and overweight older than stated age, better groomed wearing clean cloths   Behavior:  evasive and guarded with no psychosomatic complaints, friendly polite and pleasant today   Speech:  normal pitch and normal volume but circumstantial and tangential with stilted speech    Mood:  anxious and dysthymic   Affect:  mood-congruent, elated and entitled anxious and irritated and sad at times but pleasant today    Thought Process:  goal directed and illogical slightly pressured and tangential and talks as if in a court of law   Thought Content:    No current suicidal homicidal thoughts intent or plans reported  No phobias obsessions or compulsions reported  Still accusatory  to certain staff as if paranoid but less often    No overt delusions elicited today   Perceptual Disturbances: None and does not appear responding to internal stimuli    Risk Potential: Tendency to not care for herself    Sensorium:  person, place, time/date, situation, day of week, month of year and time   Cognition:  grossly intact with no deficits in recent or remote memory or immediate recall   Consciousness:  alert and awake    Attention: Intact concentration and attention span   Intellect: Considered to be at least of average intelligence   Insight:  limited but improving   Judgment: improving      Motor Activity: no abnormal movements         Recent Labs:  Results Reviewed     None          I/O Past 24 hours:  No intake/output data recorded  No intake/output data recorded  Assessment / Plan:     Schizoaffective disorder, bipolar type ([address] )      Reason for continued inpatient care:  Because of underlying paranoia and inability to care for herself on her own and multiple somatic complaints  Acceptance by patient:  Accepting  Mau Ho in recovery:  About living in same personal care home at the [place] once she feels better  Understanding of medications :  Has some understanding  Involved in reintegration process: On off unit privileges now  Trusting in relatoinship with psychiatrist:  Trusting    Recommended Treatment:    Medication changes:  1) none today  Non-pharmacological treatments  1) continue with  individual therapy group therapy, milieu therapy and occupational therapy and milieu therapy involving multidisciplinary team approach with psychotherapist, case management, nursing, peer support services, art therapist etc using recovery principles and psycho-education about accepting illness and need for treatment   2) encourage to cooperate with behavior plan  3) encourage to attend to ADLs like taking showers and wearing clean clothes   4) Encourage to attend groups   5) see how she does on off unit privileges and encourage to go off unit with staff escort  and expectations discussed with her and she did verbalize an understanding, consult respiratory to see if she needs portable oxygen  Safety  1) Safety/communication plan established targeting dynamic risk factors above    Discharge Plan most likely back to the a:  Personal care boarding home with act services once her delusions are managed and mood becomes more stabilized and she attends to her ADLs she becomes more receptive to treatment compliance but she has not shown any improvement in the last 5 months since she has been on the unit and hence the referral needs to be initiated for the  [address] [place] [address] / Coordination of Care    Total floor / unit time spent today 15 minutes  Greater than 50% of total time was spent with the patient and / or family counseling and / or coordination of care  A description of the counseling / coordination of care  Patient's Rights, confidentiality and exceptions to confidentiality, use of automated medical record, Jeb Titus adeline staff access to medical record, and consent to treatment reviewed      Deep Durand MD

## 2019-12-25 NOTE — NURSING NOTE
Received pt in bed at change of shift with eyes closed; chest movement noted  Continues the same thus this far as per q 15 min room checks  O2 at one liter via compressor  Will continue to monitor

## 2019-12-25 NOTE — PROGRESS NOTES
Progress Note - Behavioral Health   Drew Prime 60 y o  female MRN: [id number]  Unit/Bed#: [facility] 428-41 Encounter: [id number]    The patient was seen for continuing care and reviewed with treatment team   Staff reports no significant change in the past 24 hours  Patient was observed sitting in dining room social with peers  She is pleasant and cooperative upon approach, displays good eye contact throughout the encounter with an appropriate affect noted  She is currently rating her depression as a 5/10 with mild anxiety  She is reporting adequate sleep with decreased appetite and energy level  Patient had complaint of nausea and stated she uses Zofran which is helpful  She is denying any suicidal/homicidal ideation or auditory/visual hallucinations and does not appear to be responding to internal stimuli at time of encounter  She is compliant with medications and reports no side effects at this time  Denies pain  Mental Status Evaluation:  Appearance:  Adequate hygiene and grooming   Behavior:  cooperative and friendly   Mood:  anxious and depressed   Affect: appropriate   Speech: Normal rate and Normal volume   Thought Process:  Goal directed and coherent   Thought Content:  Does not verbalize delusional material   Perceptual Disturbances: Denies hallucinations and does not appear to be responding to internal stimuli   Risk Potential: No suicidal or homicidal ideation   Attention/Concentration attention span and concentration were age appropriate   Orientation:   Alert and oriented x3   Gait/Station: Not observed   Motor Activity: No abnormal movement noted     Progress Toward Goals:  No change    Assessment/Plan    Principal Problem:    Schizoaffective disorder, bipolar type ([facility] [address] )  Active Problems:    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[facility] [address] )    Acquired hypothyroidism    Gastroesophageal reflux disease without esophagitis    At risk for aspiration    Ear ache      Recommended Treatment:   1   Continue with pharmacotherapy, group therapy, milieu therapy and occupational therapy  2  Continue with safety checks per unit protocol  3  No medication changes at this time  The patient will be maintained on the following medications:    Current Facility-Administered Medications:  acetaminophen 650 mg Oral Q6H PRN Isidoro Gonzalez MD   albuterol 2 puff Inhalation Q4H PRN Isidoro Gonzalez MD   aluminum-magnesium hydroxide-simethicone 15 mL Oral Q4H PRN Isidoro Gonzalez MD   ammonium lactate 1 application Topical BID PRN Isidoro Gonzalez MD   benzonatate 100 mg Oral TID PRN Isidoro Gonzalez MD   benztropine 1 mg Intramuscular Q8H PRN Isidoro Gonzalez MD   carbamide peroxide 5 drop Left Ear BID Rona Roman MD   cloZAPine 25 mg Oral BID Isidoro Gonzalez MD   cloZAPine 50 mg Oral BID Isidoro Gonzalez MD   EPINEPHrine PF 0 15 mg Intramuscular Once PRN Isidoro Gonzalez MD   fluticasone-vilanterol 1 puff Inhalation Daily Isidoro Gonzalez MD   ketotifen 1 drop Right Eye BID PRN Isidoro Gonzalez MD   levothyroxine 125 mcg Oral Early Morning Isidoro Gonzalez MD   magnesium hydroxide 30 mL Oral Daily PRN Isidoro Gonzalez MD   montelukast 10 mg Oral HS Isidoro Gonzalez MD   OLANZapine 5 mg Intramuscular Q8H PRN Isidoro Gonzalez MD   OLANZapine 5 mg Oral Q8H PRN Isidoro Gonzalez MD   ondansetron 4 mg Oral Q6H PRN Isidoro Gonzalez MD   pantoprazole 40 mg Oral Early Morning Isidoro Gonzalez MD   polyethylene glycol 17 g Oral Daily PRN Isidoro Gonzalez MD   polyvinyl alcohol 1 drop Both Eyes Q3H PRN Isidoro Gonzalez MD   sertraline 50 mg Oral BID Isidoro Gonzalez MD   sucralfate 1,000 mg Oral BID Arin Parker MD   theophylline 200 mg Oral Daily Isidoro Gonzalez MD   tiotropium 18 mcg Inhalation Daily Isidoro Gonzalez MD   traZODone 25 mg Oral HS PRN Isidoro Gonzalez MD       Risks, benefits and possible side effects of Medications:   Risks, benefits, and possible side effects of medications explained to patient and patient verbalizes understanding         ABILIO Nolen      Portions of the record may have been created with voice recognition software  Occasional wrong word or "sound a like" substitutions may have occurred due to the inherent limitations of voice recognition software  Read the chart carefully and recognize, using context, where substitutions have occurred

## 2019-12-25 NOTE — PLAN OF CARE
Problem: Alteration in Thoughts and Perception  Goal: Attend and participate in unit activities, including therapeutic, recreational, and educational groups  Description  Interventions:  - Provide therapeutic and educational activities daily, encourage attendance and participation, and document same in the medical record     CERTIFIED PEER SPECIALIST INTERVENTIONS:    Complete peer assessment with patient to assess their needs and identify their goals to complete while in the recovery program as well as once discharged into the community  Patient will complete WRAP Plan, Crisis Plan and [employer]  Patient will attend 50% of groups offered on the unit  Patient will complete a goal card weekly  Outcome: Not Progressing     Problem: Risk for Self Injury/Neglect  Goal: Attend and participate in unit activities, including therapeutic, recreational, and educational groups  Description  Interventions:  - Provide therapeutic and educational activities daily, encourage attendance and participation, and document same in the medical record  - Obtain collateral information, encourage visitation and family involvement in care   Outcome: Not Progressing     Problem: Depression  Goal: Refrain from isolation  Description  Interventions:  - Develop a trusting relationship   - Encourage socialization   12/24/2019 1943 by Liu Canela LPN  Outcome: Not Progressing  12/24/2019 1200 by Liu Canela LPN  Outcome: Progressing     Problem: Anxiety  Goal: Anxiety is at manageable level  Description  Interventions:  - Assess and monitor patient's anxiety level  - Monitor for signs and symptoms of anxiety both physical and emotional (heart palpitations, chest pain, shortness of breath, headaches, nausea, feeling jumpy, restlessness, irritable, apprehensive)  - Collaborate with interdisciplinary team and initiate plan and interventions as ordered    - [place] patient to unit/surroundings  - Explain treatment plan  - Encourage participation in care  - Encourage verbalization of concerns/fears  - Identify coping mechanisms  - Assist in developing anxiety-reducing skills  - Administer/offer alternative therapies  - Limit or eliminate stimulants  Outcome: Julio Cesartej Herzogneli has been awake alert and isolative to room  Pt has only been visible for meds and meal only, individual consumed 0 % of meal and 100% of ensure supplement  Pt was encouraged to spend time out of room and socialize with staff and peers but refused stated "I am not feeling good" no s/s of distress , no c/o pain or discomfort  Pt denies feelings of depression and self harm,encouraged to seek staff as needed  No behaviors at this time  Will continue to monitor

## 2019-12-26 NOTE — ASSESSMENT & PLAN NOTE
Psychiatry Progress Note  Patient is happy that she has made progress in attending more groups and taking showers twice a week which is a big improvement for her ever since she was told that we may have to consider transferring her [facility] if she did not make any improvements  She did not voice any major psychosomatic symptoms like having cancer or dying from terrible illness but periodically refuses to eat and just consumes the Ensure and still questions her medications sometimes  She is happy that she can still go back to the [facility] rather than to the [facility] if she keeps up with her improvement  for a few more weeks in a row  She no longer voices any delusions that there may be a micro chip on 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 in several weeks now but she still tends to accuse certain staff periodically claiming that they are tampering with her spirometer and oxygen concentrator off and on but less often lately  She is also looking forward to going on a therapeutic pass with his sister for the new year day but reminded her that she needs to show she can go off unit and off grounds first with staff and we need to check whether she needs the portable oxygen for activities of grounds      Current medications:    Current Facility-Administered Medications:     acetaminophen (TYLENOL) tablet 650 mg, 650 mg, Oral, Q6H PRN, Ivonne Nevarez MD    albuterol (PROVENTIL HFA,VENTOLIN HFA) inhaler 2 puff, 2 puff, Inhalation, Q4H PRN, Ivonne Nevarez MD, 2 puff at 10/11/19 0424    aluminum-magnesium hydroxide-simethicone (MYLANTA) 200-200-20 mg/5 mL oral suspension 15 mL, 15 mL, Oral, Q4H PRN, Ivonne Nevarez MD    ammonium lactate (LAC-HYDRIN) 12 % lotion 1 application, 1 application, Topical, BID PRN, Ivonne Nevarez MD    benzonatate (TESSALON PERLES) capsule 100 mg, 100 mg, Oral, TID PRN, Ivonne Nevarez MD    benztropine (COGENTIN) injection 1 mg, 1 mg, Intramuscular, Q8H PRN, MD Nataly Cobos carbamide peroxide (DEBROX) 6 5 % otic solution 5 drop, 5 drop, Left Ear, BID, Mame Noble Aschoff, MD, 5 drop at 12/25/19 0834    cloZAPine (CLOZARIL) tablet 25 mg, 25 mg, Oral, BID, Mynor Terry MD, 25 mg at 12/25/19 2142    cloZAPine (CLOZARIL) tablet 50 mg, 50 mg, Oral, BID, Mynor Terry MD, 50 mg at 12/25/19 2142    EPINEPHrine PF (ADRENALIN) 1 mg/mL injection 0 15 mg, 0 15 mg, Intramuscular, Once PRN, Mynor Terry MD    fluticasone-vilanterol (BREO ELLIPTA) 200-25 MCG/INH inhaler 1 puff, 1 puff, Inhalation, Daily, Mynor Terry MD, 1 puff at 12/25/19 0834    ketotifen (ZADITOR) 0 025 % ophthalmic solution 1 drop, 1 drop, Right Eye, BID PRN, Mynor Terry MD    levothyroxine tablet 125 mcg, 125 mcg, Oral, Early Morning, Mynor Terry MD, 125 mcg at 12/26/19 0608    magnesium hydroxide (MILK OF MAGNESIA) 400 mg/5 mL oral suspension 30 mL, 30 mL, Oral, Daily PRN, Mynor Terry MD    montelukast (SINGULAIR) tablet 10 mg, 10 mg, Oral, HS, Mynor Terry MD, 10 mg at 12/24/19 2109    OLANZapine (ZyPREXA) IM injection 5 mg, 5 mg, Intramuscular, Q8H PRN, Mynor Terry MD    OLANZapine (ZyPREXA) tablet 5 mg, 5 mg, Oral, Q8H PRN, Mynor Terry MD    ondansetron (ZOFRAN-ODT) dispersible tablet 4 mg, 4 mg, Oral, Q6H PRN, Mynor Terry MD, 4 mg at 12/09/19 1757    pantoprazole (PROTONIX) EC tablet 40 mg, 40 mg, Oral, Early Morning, Mynor Terry MD, 40 mg at 12/26/19 0608    polyethylene glycol (MIRALAX) packet 17 g, 17 g, Oral, Daily PRN, Mynor Terry MD    polyvinyl alcohol (LIQUIFILM TEARS) 1 4 % ophthalmic solution 1 drop, 1 drop, Both Eyes, Q3H PRN, Mynor Terry MD    sertraline (ZOLOFT) tablet 50 mg, 50 mg, Oral, BID, Mynor Terry MD, 50 mg at 12/25/19 2142    sucralfate (CARAFATE) oral suspension 1,000 mg, 1,000 mg, Oral, BID, Cat Torres MD, 1,000 mg at 12/26/19 0608    theophylline (JEF-24) 24 hr capsule 200 mg, 200 mg, Oral, Daily, Mynor Terry MD, 200 mg at 12/25/19 0834    tiotropium [facility]) capsule for inhaler 18 mcg, 18 mcg, Inhalation, Daily, Jeffrey Gallegos MD, 18 mcg at 12/25/19 0834    traZODone (DESYREL) tablet 25 mg, 25 mg, Oral, HS PRN, Jeffrey Gallegos MD  Justification if on more than two antipsychotics:  Only on his clozapine  Side effects if any:  None    Risks , benefits, side effects and precautions of medications discussed with patient and reminded patient to let us know any problems with medications     Objective:     Vital Signs:  Vitals:    12/25/19 0637 12/25/19 1500 12/25/19 2020 12/26/19 0600   BP:  91/61 92/60    BP Location:  Left arm Left arm    Pulse:  81 78    Resp:  14 15    Temp:  (!) 97 2 °F (36 2 °C) (!) 97 °F (36 1 °C)    TempSrc:  Temporal Temporal    SpO2: 97% 93% 92% 95%   Weight:       Height:         Appearance:  age appropriate, casually dressed and overweight older than stated age, not well groomed with uncombed hair wearing clean clothes   Behavior:  evasive and guarded with no psychosomatic complaints, friendly polite and pleasant today   Speech:  normal pitch and normal volume but circumstantial and tangential with stilted speech    Mood:  anxious and dysthymic   Affect:  mood-congruent, elated and entitled anxious and irritated and sad at times but pleasant today    Thought Process:  goal directed and illogical slightly pressured and tangential and talks as if in a court of law   Thought Content:    No current suicidal homicidal thoughts intent or plans reported  No phobias obsessions or compulsions reported  Still accusatory  to certain staff as if paranoid but less often    No overt delusions elicited today but still with some underlying psychosomatic complaints   Perceptual Disturbances: None and does not appear responding to internal stimuli    Risk Potential: Tendency to not care for herself    Sensorium:  person, place, time/date, situation, day of week, month of year and time   Cognition:  grossly intact with no deficits in recent or remote memory or immediate recall   Consciousness:  alert and awake    Attention: Intact concentration and attention span   Intellect: Considered to be at least of average intelligence   Insight:  limited but improving   Judgment: improving      Motor Activity: no abnormal movements         Recent Labs:  Results Reviewed     None          I/O Past 24 hours:  No intake/output data recorded  No intake/output data recorded  Assessment / Plan:     Schizoaffective disorder, bipolar type ([facility] [address] )      Reason for continued inpatient care:  Because of underlying paranoia and inability to care for herself on her own and multiple somatic complaints  Acceptance by patient:  Accepting  [place] in recovery:  About living in same personal care home at the [place] once she feels better  Understanding of medications :  Has some understanding  Involved in reintegration process: On off unit privileges now  Trusting in relatoinship with psychiatrist:  Trusting    Recommended Treatment:    Medication changes:  1) none today  Non-pharmacological treatments  1) continue with  individual therapy group therapy, milieu therapy and occupational therapy and milieu therapy involving multidisciplinary team approach with psychotherapist, case management, nursing, peer support services, art therapist etc using recovery principles and psycho-education about accepting illness and need for treatment   2) encourage to cooperate with behavior plan  3) encourage to attend to ADLs like taking showers and wearing clean clothes   4) Encourage to attend groups   5) see how she does on off unit privileges and encourage to go off unit with staff escort  and expectations discussed with her and she did verbalize an understanding, consult respiratory to see if she needs portable oxygen  Safety  1) Safety/communication plan established targeting dynamic risk factors above    Discharge Plan most likely back to the a:  Personal care boarding home with act services once her delusions are managed and mood becomes more stabilized and she attends to her ADLs she becomes more receptive to treatment compliance but she has not shown any improvement in the last 5 months since she has been on the unit and hence the referral needs to be initiated for the  [address] [place] [address] / Coordination of Care    Total floor / unit time spent today 15 minutes  Greater than 50% of total time was spent with the patient and / or family counseling and / or coordination of care  A description of the counseling / coordination of care  Patient's Rights, confidentiality and exceptions to confidentiality, use of automated medical record, [facility] [place] staff access to medical record, and consent to treatment reviewed      Oval MD Joseluis

## 2019-12-26 NOTE — PLAN OF CARE
Problem: Alteration in Thoughts and Perception  Goal: Verbalize thoughts and feelings  Description  Interventions:  - Promote a nonjudgmental and trusting relationship with the patient through active listening and therapeutic communication  - Assess patient's level of functioning, behavior and potential for risk  - Engage patient in 1 on 1 interactions for a minimum of 15 minutes each session  - Encourage patient to express fears, feelings, frustrations, and discuss symptoms    - [place] patient to reality, help patient recognize reality-based thinking   - Administer medications as ordered and assess for potential side effects  - Provide the patient education related to the signs and symptoms of the illness and desired effects of prescribed medications  Outcome: Progressing  Goal: Agree to be compliant with medication regime, as prescribed and report medication side effects  Description  Interventions:  - Offer appropriate PRN medication and supervise ingestion; conduct aims, as needed   Outcome: Progressing  Goal: Attend and participate in unit activities, including therapeutic, recreational, and educational groups  Description  Interventions:  - Provide therapeutic and educational activities daily, encourage attendance and participation, and document same in the medical record     CERTIFIED PEER SPECIALIST INTERVENTIONS:    Complete peer assessment with patient to assess their needs and identify their goals to complete while in the recovery program as well as once discharged into the community  Patient will complete WRAP Plan, Crisis Plan and 5 Life Domains  Patient will attend 50% of groups offered on the unit  Patient will complete a goal card weekly      Outcome: Progressing     Problem: Depression  Goal: Refrain from isolation  Description  Interventions:  - Develop a trusting relationship   - Encourage socialization   Outcome: Not Progressing     [address] has mostly rested in bed in free time, though, does come out to sit in phone chair in [place] when it is not in use  A bit somatic; saying the Caraf[place] "burned going down" & wondering if the Debrox gtts were responsible for this  Intake only Ensure @ meal; said the meal was dry & tough  For HS snack had Cheetos, 2 cheese sticks, OJ  Did take part in PM Group  Took all medicine except Singulair & Debrox gtts  Used the [employer] & achieved volume of 1100-1250ml  Wearing now her QHS humidified nasal O2 @ 1L for bed

## 2019-12-26 NOTE — NURSING NOTE
Received pt in bed at change of shift with eyes closed; chest movement noted  Continues the same thus this far as per q 15 min room checks  Will continue to monitor  Receiving O2 via NC at one Liter per orders

## 2019-12-26 NOTE — PLAN OF CARE
Problem: Alteration in Thoughts and Perception  Goal: Agree to be compliant with medication regime, as prescribed and report medication side effects  Description  Interventions:  - Offer appropriate PRN medication and supervise ingestion; conduct aims, as needed   Outcome: Progressing     Problem: Alteration in Thoughts and Perception  Goal: Attend and participate in unit activities, including therapeutic, recreational, and educational groups  Description  Interventions:  - Provide therapeutic and educational activities daily, encourage attendance and participation, and document same in the medical record     CERTIFIED PEER SPECIALIST INTERVENTIONS:    Complete peer assessment with patient to assess their needs and identify their goals to complete while in the recovery program as well as once discharged into the community  Patient will complete WRAP Plan, Crisis Plan and 5 Life Domains  Patient will attend 50% of groups offered on the unit  Patient will complete a goal card weekly  Outcome: Not Progressing  Goal: Recognize dysfunctional thoughts, communicate reality-based thoughts at the time of discharge  Description  Interventions:  - Provide medication and psycho-education to assist patient in compliance and developing insight into his/her illness   Outcome: Not Progressing    Patient continues to be isolative and withdrawn  Remains somatic about not being able to swallow and about her oxygen  Ate 25% of her breakfast this morning which was a bowl of oatmeal and all of her drinks, she did refuse lunch  Attended and participated in [employer] group this shift  She continues to lack motivation and insight  Remains in bed sleeping on and off today  Continue to monitor

## 2019-12-26 NOTE — PLAN OF CARE
Problem: Alteration in Thoughts and Perception  Goal: Verbalize thoughts and feelings  Description  Interventions:  - Promote a nonjudgmental and trusting relationship with the patient through active listening and therapeutic communication  - Assess patient's level of functioning, behavior and potential for risk  - Engage patient in 1 on 1 interactions for a minimum of 15 minutes each session  - Encourage patient to express fears, feelings, frustrations, and discuss symptoms    - [place] patient to reality, help patient recognize reality-based thinking   - Administer medications as ordered and assess for potential side effects  - Provide the patient education related to the signs and symptoms of the illness and desired effects of prescribed medications  Outcome: Progressing  Goal: Attend and participate in unit activities, including therapeutic, recreational, and educational groups  Description  Interventions:  - Provide therapeutic and educational activities daily, encourage attendance and participation, and document same in the medical record     CERTIFIED PEER SPECIALIST INTERVENTIONS:    Complete peer assessment with patient to assess their needs and identify their goals to complete while in the recovery program as well as once discharged into the community  Patient will complete WRAP Plan, Crisis Plan and 5 Life Domains  Patient will attend 50% of groups offered on the unit  Patient will complete a goal card weekly  Outcome: Progressing  Patient doing well at attending groups and current percentage of attendance is now at 72%  Patient shows improvement in attendance, participation and cooperation  Engaging in groups going very well and Patient is now able to stay focused and on task without conversing about "medical" issues she has or does not have  Patient more alert, attentive and pleasant in group settings and on unit

## 2019-12-26 NOTE — PROGRESS NOTES
12/26/19 0900   Team Meeting   Meeting Type Daily Rounds   Team Members Present   Team Members Present Physician;Nurse; Other (Discipline and Name)   Physician Team Member Dr Carolee Donald Team Member Tal Bingham RN   Other (Discipline and Name) Anabela Ji LCSW     Somatic, did not go off-unit as planned

## 2019-12-26 NOTE — PROGRESS NOTES
Pt pleasant, cooperative, med complaint beginning of shift  For dinner only ate mashed potatoes & drank ensure  Stated roast beef was dry & tough to eat  VS stable this shift, O2 sat 93%  @ 1800 pt asked that I call [facility] to discuss her O2 status upon d/c  Preccupied with not being able to breathe & all the things that can go run  Informed pt that staff takes VS to make sure she is safe @ all times  Pt returned to her room   No behavioral issues, will continue to monitor

## 2019-12-26 NOTE — PROGRESS NOTES
12/26/19 1100   Activity/Group Checklist   Group   (IMR/Coping with Traumatic Events and Relapse  )   Attendance Attended   Attendance Duration (min) 46-60   Interactions Interacted appropriately   Affect/Mood Appropriate;Normal range   Goals Achieved Identified feelings; Able to listen to others; Able to engage in interactions; Able to self-disclose

## 2019-12-27 NOTE — PROGRESS NOTES
Progress Note - Pushpa Morgan 1955, 60 y o  female MRN: [id number]    Unit/Bed#: [facility] 110-02 Encounter: [id number]    Primary Care Provider: Amy Cameron PA-C   Date and time admitted to hospital: 7/23/2019  5:30 PM        * Schizoaffective disorder, bipolar type [facility])  Assessment & Plan  Psychiatry Progress Note  Patient again reporting some psychosomatic symptoms like not being able to breathe and being dependent on oxygen when she was out has been refusing to even go to the [place] next door to [facility] even with staff escort  However she has not voiced any concerns about her having cancer or dying from terrible illness but periodically refuses to eat and just consumes the Ensure and still questions her medications sometimes  She is happy that she can still go back to the [facility] rather than to the [facility] if she keeps up with her improvement  for a few more weeks in a row by continuing to attend groups more than 50% and attending to her ADLs like taking showers twice a week  She no longer voices any delusions that there may be a micro chip on 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 in several weeks now but she still tends to accuse certain staff periodically claiming that they are tampering with her spirometer and oxygen concentrator off and on    She is also looking forward to going on a therapeutic pass with his sister for the new year day but reminded her that she needs to show she can go off unit and off grounds first with staff and we need to check whether she needs the portable oxygen for activities of grounds for which respiratory was called for an evaluation    Current medications:    Current Facility-Administered Medications:     acetaminophen (TYLENOL) tablet 650 mg, 650 mg, Oral, Q6H PRN, Carissa Willis MD    albuterol (PROVENTIL HFA,VENTOLIN HFA) inhaler 2 puff, 2 puff, Inhalation, Q4H PRN, Carissa Willis MD, 2 puff at 10/11/19 0424    aluminum-magnesium hydroxide-simethicone (MYLANTA) 200-200-20 mg/5 mL oral suspension 15 mL, 15 mL, Oral, Q4H PRN, Prakash Brewster MD    ammonium lactate (LAC-HYDRIN) 12 % lotion 1 application, 1 application, Topical, BID PRN, Prakash Brewster MD    benzonatate (TESSALON PERLES) capsule 100 mg, 100 mg, Oral, TID PRN, Prakash Brewster MD    benztropine (COGENTIN) injection 1 mg, 1 mg, Intramuscular, Q8H PRN, Prakash Brewster MD    carbamide peroxide (DEBROX) 6 5 % otic solution 5 drop, 5 drop, Left Ear, BID, Rona Phillips MD, 5 drop at 12/26/19 1844    cloZAPine (CLOZARIL) tablet 25 mg, 25 mg, Oral, BID, Prakash Brewster MD, 25 mg at 12/26/19 2116    cloZAPine (CLOZARIL) tablet 50 mg, 50 mg, Oral, BID, Prakash Brewster MD, 50 mg at 12/26/19 2116    EPINEPHrine PF (ADRENALIN) 1 mg/mL injection 0 15 mg, 0 15 mg, Intramuscular, Once PRN, Prakash Brewster MD    fluticasone-vilanterol (BREO ELLIPTA) 200-25 MCG/INH inhaler 1 puff, 1 puff, Inhalation, Daily, Prakash Brewster MD, 1 puff at 12/26/19 0830    ketotifen (ZADITOR) 0 025 % ophthalmic solution 1 drop, 1 drop, Right Eye, BID PRN, Prakash Brewster MD    levothyroxine tablet 125 mcg, 125 mcg, Oral, Early Morning, Prakash Brewster MD, 125 mcg at 12/27/19 0608    magnesium hydroxide (MILK OF MAGNESIA) 400 mg/5 mL oral suspension 30 mL, 30 mL, Oral, Daily PRN, Prakash Brewster MD    montelukast (SINGULAIR) tablet 10 mg, 10 mg, Oral, HS, Prakash Brewster MD, 10 mg at 12/24/19 2109    OLANZapine (ZyPREXA) IM injection 5 mg, 5 mg, Intramuscular, Q8H PRN, Prakash Brewster MD    OLANZapine (ZyPREXA) tablet 5 mg, 5 mg, Oral, Q8H PRN, Prakash Brewster MD    ondansetron (ZOFRAN-ODT) dispersible tablet 4 mg, 4 mg, Oral, Q6H PRN, Prakash Brewster MD, 4 mg at 12/09/19 1757    pantoprazole (PROTONIX) EC tablet 40 mg, 40 mg, Oral, Early Morning, Prakash Brewster MD, 40 mg at 12/27/19 0608    polyethylene glycol (MIRALAX) packet 17 g, 17 g, Oral, Daily PRN, Prakash Brewster MD    polyvinyl alcohol (LIQUIFILM TEARS) 1 4 % ophthalmic solution 1 drop, 1 drop, Both Eyes, Q3H PRN, Felisa Florence MD    sertraline (ZOLOFT) tablet 50 mg, 50 mg, Oral, BID, Felisa Florence MD, 50 mg at 12/26/19 2115    sucralfate (CARAFATE) oral suspension 1,000 mg, 1,000 mg, Oral, BID, Jaiden Mcknight MD, 1,000 mg at 12/27/19 0608    theophylline (JEF-24) 24 hr capsule 200 mg, 200 mg, Oral, Daily, Felisa Florence MD, 200 mg at 12/26/19 7818    tiotropium [facility]) capsule for inhaler 18 mcg, 18 mcg, Inhalation, Daily, Felisa Florence MD, 18 mcg at 12/26/19 0830    traZODone (DESYREL) tablet 25 mg, 25 mg, Oral, HS PRN, Felisa Florence MD  Justification if on more than two antipsychotics:  Only on his clozapine  Side effects if any:  None    Risks , benefits, side effects and precautions of medications discussed with patient and reminded patient to let us know any problems with medications     Objective:     Vital Signs:  Vitals:    12/26/19 0705 12/26/19 1500 12/26/19 1930 12/27/19 0500   BP: 98/57 108/68 102/78    BP Location: Left arm Left arm Left arm    Pulse: 70 91 88    Resp: 18 16 16    Temp:  97 6 °F (36 4 °C) (!) 97 4 °F (36 3 °C)    TempSrc:  Temporal Temporal    SpO2:  93% 94% 96%   Weight:       Height:         Appearance:  age appropriate, casually dressed and overweight older than stated age, not well groomedwearing clean clothes today but her looks uncombed   Behavior:  evasive and guarded with no psychosomatic complaints, friendly polite and pleasant today   Speech:  normal pitch and normal volume but circumstantial and tangential with stilted speech    Mood:  anxious and dysthymic   Affect:  mood-congruent, elated and entitled anxious and irritated and sad at times but pleasant today    Thought Process:  goal directed and illogical slightly pressured and tangential and talks as if in a court of law   Thought Content:  No current suicidal homicidal thoughts intent or plans reported  No phobias obsessions or compulsions reported   Still accusatory  to certain staff as if paranoid but less often  No overt delusions elicited today but still with some underlying psychosomatic complaints about not being able to breathe or swallow   Perceptual Disturbances: None and does not appear responding to internal stimuli    Risk Potential: Tendency to not care for herself    Sensorium:  person, place, time/date, situation, day of week, month of year and time   Cognition:  grossly intact with no deficits in recent or remote memory or immediate recall   Consciousness:  alert and awake    Attention: Intact concentration and attention span   Intellect: Considered to be at least of average intelligence   Insight:  limited but improving   Judgment: improving      Motor Activity: no abnormal movements         Recent Labs:  Results Reviewed     None          I/O Past 24 hours:  No intake/output data recorded  No intake/output data recorded  Assessment / Plan:     [facility]oaffective [facility]      Reason for continued inpatient care:  Because of underlying paranoia and inability to care for herself on her own and multiple somatic complaints  Acceptance by patient:  Accepting  Clara Arredondo in recovery:  About living in same personal care home at the [place] once she feels better  Understanding of medications :  Has some understanding  Involved in reintegration process: On off unit privileges now  Trusting in relatoinship with psychiatrist:  Trusting    Recommended Treatment:    Medication changes:  1) none today  Non-pharmacological treatments  1) continue with  individual therapy group therapy, milieu therapy and occupational therapy and milieu therapy involving multidisciplinary team approach with psychotherapist, case management, nursing, peer support services, art therapist etc using recovery principles and psycho-education about accepting illness and need for treatment     2) encourage to cooperate with behavior plan  3) encourage to attend to ADLs like taking showers and wearing clean clothes   4) Encourage to attend groups   5) see how she does on off unit privileges and encourage to go off unit with staff escort  and expectations discussed with her and she did verbalize an understanding, consult respiratory to see if she needs portable oxygen  Safety  1) Safety/communication plan established targeting dynamic risk factors above  Discharge Plan most likely back to the a:  Personal care boarding home with act services once her delusions are managed and mood becomes more stabilized and she attends to her ADLs she becomes more receptive to treatment compliance but she has not shown any improvement in the last 5 months since she has been on the unit and hence the referral needs to be initiated for the  [address] [place] [address] / Coordination of Care    Total floor / unit time spent today 15 minutes  Greater than 50% of total time was spent with the patient and / or family counseling and / or coordination of care  A description of the counseling / coordination of care  Patient's Rights, confidentiality and exceptions to confidentiality, use of automated medical record, [facility] staff access to medical record, and consent to treatment reviewed      Shaggy Rangel MD

## 2019-12-27 NOTE — ASSESSMENT & PLAN NOTE
Psychiatry Progress Note  Patient again reporting some psychosomatic symptoms like not being able to breathe and being dependent on oxygen when she was out has been refusing to even go to the [place] next door to [facility] [place] even with staff escort  However she has not voiced any concerns about her having cancer or dying from terrible illness but periodically refuses to eat and just consumes the Ensure and still questions her medications sometimes  She is happy that she can still go back to the [facility] care [place] rather than to the [facility] if she keeps up with her improvement  for a few more weeks in a row by continuing to attend groups more than 50% and attending to her ADLs like taking showers twice a week  She no longer voices any delusions that there may be a micro chip on 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 in several weeks now but she still tends to accuse certain staff periodically claiming that they are tampering with her spirometer and oxygen concentrator off and on    She is also looking forward to going on a therapeutic pass with his sister for the new year day but reminded her that she needs to show she can go off unit and off grounds first with staff and we need to check whether she needs the portable oxygen for activities of grounds for which respiratory was called for an evaluation    Current medications:    Current Facility-Administered Medications:     acetaminophen (TYLENOL) tablet 650 mg, 650 mg, Oral, Q6H PRN, Teri Huggins MD    albuterol (PROVENTIL HFA,VENTOLIN HFA) inhaler 2 puff, 2 puff, Inhalation, Q4H PRN, Teri Huggins MD, 2 puff at 10/11/19 0424    aluminum-magnesium hydroxide-simethicone (MYLANTA) 200-200-20 mg/5 mL oral suspension 15 mL, 15 mL, Oral, Q4H PRN, Teri Huggins MD    ammonium lactate (LAC-HYDRIN) 12 % lotion 1 application, 1 application, Topical, BID PRN, Teri Huggins MD    benzonatate (TESSALON PERLES) capsule 100 mg, 100 mg, Oral, TID PRN, MD Petra Ashley benztropine (COGENTIN) injection 1 mg, 1 mg, Intramuscular, Q8H PRN, Greer Beltran MD    carbamide peroxide (DEBROX) 6 5 % otic solution 5 drop, 5 drop, Left Ear, BID, Rona Alcantara MD, 5 drop at 12/26/19 1844    cloZAPine (CLOZARIL) tablet 25 mg, 25 mg, Oral, BID, Greer Beltran MD, 25 mg at 12/26/19 2116    cloZAPine (CLOZARIL) tablet 50 mg, 50 mg, Oral, BID, Greer Beltran MD, 50 mg at 12/26/19 2116    EPINEPHrine PF (ADRENALIN) 1 mg/mL injection 0 15 mg, 0 15 mg, Intramuscular, Once PRN, Greer Beltran MD    fluticasone-vilanterol (BREO ELLIPTA) 200-25 MCG/INH inhaler 1 puff, 1 puff, Inhalation, Daily, Greer Beltran MD, 1 puff at 12/26/19 0830    ketotifen (ZADITOR) 0 025 % ophthalmic solution 1 drop, 1 drop, Right Eye, BID PRN, Greer Beltran MD    levothyroxine tablet 125 mcg, 125 mcg, Oral, Early Morning, Greer Beltran MD, 125 mcg at 12/27/19 0608    magnesium hydroxide (MILK OF MAGNESIA) 400 mg/5 mL oral suspension 30 mL, 30 mL, Oral, Daily PRN, Greer Beltran MD    montelukast (SINGULAIR) tablet 10 mg, 10 mg, Oral, HS, Greer Beltran MD, 10 mg at 12/24/19 2109    OLANZapine (ZyPREXA) IM injection 5 mg, 5 mg, Intramuscular, Q8H PRN, Greer Beltran MD    OLANZapine (ZyPREXA) tablet 5 mg, 5 mg, Oral, Q8H PRN, Greer Beltran MD    ondansetron (ZOFRAN-ODT) dispersible tablet 4 mg, 4 mg, Oral, Q6H PRN, Greer Beltran MD, 4 mg at 12/09/19 1757    pantoprazole (PROTONIX) EC tablet 40 mg, 40 mg, Oral, Early Morning, Greer Beltran MD, 40 mg at 12/27/19 0608    polyethylene glycol (MIRALAX) packet 17 g, 17 g, Oral, Daily PRN, Greer Beltran MD    polyvinyl alcohol (LIQUIFILM TEARS) 1 4 % ophthalmic solution 1 drop, 1 drop, Both Eyes, Q3H PRN, Greer Beltran MD    sertraline (ZOLOFT) tablet 50 mg, 50 mg, Oral, BID, Greer Beltran MD, 50 mg at 12/26/19 2115    sucralfate (CARAFATE) oral suspension 1,000 mg, 1,000 mg, Oral, BID, Cat Torres MD, 1,000 mg at 12/27/19 0608    theophylline (JEF-24) 24 hr capsule 200 mg, 200 mg, Oral, Daily, Sarah Hanna MD, 200 mg at 12/26/19 0829    tiotropium [facility]) capsule for inhaler 18 mcg, 18 mcg, Inhalation, Daily, Sarah Hanna MD, 18 mcg at 12/26/19 0830    traZODone (DESYREL) tablet 25 mg, 25 mg, Oral, HS PRN, Sarah Hanna MD  Justification if on more than two antipsychotics:  Only on his clozapine  Side effects if any:  None    Risks , benefits, side effects and precautions of medications discussed with patient and reminded patient to let us know any problems with medications     Objective:     Vital Signs:  Vitals:    12/26/19 0705 12/26/19 1500 12/26/19 1930 12/27/19 0500   BP: 98/57 108/68 102/78    BP Location: Left arm Left arm Left arm    Pulse: 70 91 88    Resp: 18 16 16    Temp:  97 6 °F (36 4 °C) (!) 97 4 °F (36 3 °C)    TempSrc:  Temporal Temporal    SpO2:  93% 94% 96%   Weight:       Height:         Appearance:  age appropriate, casually dressed and overweight older than stated age, not well groomedwearing clean clothes today but her looks uncombed   Behavior:  evasive and guarded with no psychosomatic complaints, friendly polite and pleasant today   Speech:  normal pitch and normal volume but circumstantial and tangential with stilted speech    Mood:  anxious and dysthymic   Affect:  mood-congruent, elated and entitled anxious and irritated and sad at times but pleasant today    Thought Process:  goal directed and illogical slightly pressured and tangential and talks as if in a court of law   Thought Content:  No current suicidal homicidal thoughts intent or plans reported  No phobias obsessions or compulsions reported  Still accusatory  to certain staff as if paranoid but less often    No overt delusions elicited today but still with some underlying psychosomatic complaints about not being able to breathe or swallow   Perceptual Disturbances: None and does not appear responding to internal stimuli    Risk Potential: Tendency to not care for herself    Sensorium:  person, place, time/date, situation, day of week, month of year and time   Cognition:  grossly intact with no deficits in recent or remote memory or immediate recall   Consciousness:  alert and awake    Attention: Intact concentration and attention span   Intellect: Considered to be at least of average intelligence   Insight:  limited but improving   Judgment: improving      Motor Activity: no abnormal movements         Recent Labs:  Results Reviewed     None          I/O Past 24 hours:  No intake/output data recorded  No intake/output data recorded  Assessment / Plan:     [facility]ffective [facility]      Reason for continued inpatient care:  Because of underlying paranoia and inability to care for herself on her own and multiple somatic complaints  Acceptance by patient:  Accepting  [place] in recovery:  About living in same personal care home at the [place] once she feels better  Understanding of medications :  Has some understanding  Involved in reintegration process: On off unit privileges now  Trusting in relatoinship with psychiatrist:  Trusting    Recommended Treatment:    Medication changes:  1) none today  Non-pharmacological treatments  1) continue with  individual therapy group therapy, milieu therapy and occupational therapy and milieu therapy involving multidisciplinary team approach with psychotherapist, case management, nursing, peer support services, art therapist etc using recovery principles and psycho-education about accepting illness and need for treatment     2) encourage to cooperate with behavior plan  3) encourage to attend to ADLs like taking showers and wearing clean clothes   4) Encourage to attend groups   5) see how she does on off unit privileges and encourage to go off unit with staff escort  and expectations discussed with her and she did verbalize an understanding, consult respiratory to see if she needs portable oxygen  Safety  1) Safety/communication plan established targeting dynamic risk factors above  Discharge Plan most likely back to the a:  Personal care boarding home with act services once her delusions are managed and mood becomes more stabilized and she attends to her ADLs she becomes more receptive to treatment compliance but she has not shown any improvement in the last 5 months since she has been on the unit and hence the referral needs to be initiated for the  [address] [place] [address] / Coordination of Care    Total floor / unit time spent today 15 minutes  Greater than 50% of total time was spent with the patient and / or family counseling and / or coordination of care  A description of the counseling / coordination of care  Patient's Rights, confidentiality and exceptions to confidentiality, use of automated medical record, [address] staff access to medical record, and consent to treatment reviewed      Prakash Brewster MD

## 2019-12-27 NOTE — PROGRESS NOTES
Since change of shift at 1500, patient has been intrusively sitting across from the nurse station attempting to eavesdrop on staff conversations, somatically preoccupied, and attention seeking  She asked this writer if there had been an update as to the status of obtaining a portable oxygen unit for when she goes out on passes  Answered her that nothing had been officially documented yet, but what had been relayed to this writer by the respiratory therapist who saw her was that her oxygen saturation while ambulating the halls had been holding steadily at 94% on room air  Pointed out to her that she may not even need the portable oxygen unit which then made her become upset and belligerent  Attempted to further clarify that not needing to use the portable oxygen unit was a good thing but patient was unwilling to accept this notion and further became hyper-focused on how she's "sicker than you people know" and that we don't take her seriously, are lying to her, and are purposely trying to keep her physically sick / kill her  Further attempted to reality-orient and explain to her that this writer was not attempting to challenge her on whether or not portable oxygen was needed but was simply relaying what was said by respiratory but patient would not accept this, becoming more agitated, further verbally attacking this writer and then all other staff, saying that none of us know what we are doing  Subsequently, patient attempted to continue to verbally attack and discredit staff with many unsuccessful attempts at redirection by multiple staff members  It was also pointed out to her by the other nurse that she needed to self-examine her constant need to invest in maintaining / perpetuating her being an invalid with which she scoffed at and then further attempted to again turn the conversation around to how she is being victimized   Shortly after, dinner arrived and then following she retired to her room where she has remained since  She ate 50% of her dinner consisting of the egg salad off her sandwich, sherbet, and her ensure  Continue to monitor

## 2019-12-28 NOTE — PROGRESS NOTES
Psychiatry Progress Note    Subjective: Interval History     The patient is visible at times on the unit today  She is upset with staff and believes that she needs oxygen for her upcoming pass  Patient has been seen by Respiratory therapy multiple times and she was 94% yesterday on room air while ambulating per report  Patient continues to be medication and meal compliant  She states she slept well  She is pleasant this morning  he offers no other concerns today      Behavior over the last 24 hours:  unchanged  Sleep: normal  Appetite: normal  Medication side effects: No  ROS: no complaints    Current medications:    Current Facility-Administered Medications:     acetaminophen (TYLENOL) tablet 650 mg, 650 mg, Oral, Q6H PRN, Shi Pham MD    albuterol (PROVENTIL HFA,VENTOLIN HFA) inhaler 2 puff, 2 puff, Inhalation, Q4H PRN, Shi Pham MD, 2 puff at 10/11/19 0424    aluminum-magnesium hydroxide-simethicone (MYLANTA) 200-200-20 mg/5 mL oral suspension 15 mL, 15 mL, Oral, Q4H PRN, Shi Pham MD    ammonium lactate (LAC-HYDRIN) 12 % lotion 1 application, 1 application, Topical, BID PRN, Shi Pham MD    benzonatate (TESSALON PERLES) capsule 100 mg, 100 mg, Oral, TID PRN, Shi Pham MD    benztropine (COGENTIN) injection 1 mg, 1 mg, Intramuscular, Q8H PRN, Shi Pham MD    carbamide peroxide (DEBROX) 6 5 % otic solution 5 drop, 5 drop, Left Ear, BID, oRna Desir MD, 5 drop at 12/26/19 1844    cloZAPine (CLOZARIL) tablet 25 mg, 25 mg, Oral, BID, Shi Pham MD, 25 mg at 12/27/19 2032    cloZAPine (CLOZARIL) tablet 50 mg, 50 mg, Oral, BID, Shi Pham MD, 50 mg at 12/27/19 2032    EPINEPHrine PF (ADRENALIN) 1 mg/mL injection 0 15 mg, 0 15 mg, Intramuscular, Once PRN, Shi Pham MD    fluticasone-vilanterol (BREO ELLIPTA) 200-25 MCG/INH inhaler 1 puff, 1 puff, Inhalation, Daily, Shi Pham MD, 1 puff at 12/28/19 0820    ketotifen (ZADITOR) 0 025 % ophthalmic solution 1 drop, 1 drop, Right Eye, BID PRN, Amina Aparicio MD    levothyroxine tablet 125 mcg, 125 mcg, Oral, Early Morning, Amina Aparicio MD, 125 mcg at 12/28/19 0604    magnesium hydroxide (MILK OF MAGNESIA) 400 mg/5 mL oral suspension 30 mL, 30 mL, Oral, Daily PRN, Amina Aparicio MD    montelukast (SINGULAIR) tablet 10 mg, 10 mg, Oral, HS, Amina Aparicio MD, 10 mg at 12/24/19 2109    OLANZapine (ZyPREXA) IM injection 5 mg, 5 mg, Intramuscular, Q8H PRN, Amina Aparicio MD    OLANZapine (ZyPREXA) tablet 5 mg, 5 mg, Oral, Q8H PRN, Amina Aparicio MD    ondansetron (ZOFRAN-ODT) dispersible tablet 4 mg, 4 mg, Oral, Q6H PRN, Amina Aparicio MD, 4 mg at 12/09/19 1757    pantoprazole (PROTONIX) EC tablet 40 mg, 40 mg, Oral, Early Morning, Amina Aparicio MD, 40 mg at 12/28/19 0604    polyethylene glycol (MIRALAX) packet 17 g, 17 g, Oral, Daily PRN, Amina Aparicio MD    polyvinyl alcohol (LIQUIFILM TEARS) 1 4 % ophthalmic solution 1 drop, 1 drop, Both Eyes, Q3H PRN, Amina Aparicio MD    sertraline (ZOLOFT) tablet 50 mg, 50 mg, Oral, BID, Amina Aparicio MD, 50 mg at 12/28/19 1348    sucralfate (CARAFATE) oral suspension 1,000 mg, 1,000 mg, Oral, BID, Florence Rendon MD, 1,000 mg at 12/28/19 0604    theophylline (JEF-24) 24 hr capsule 200 mg, 200 mg, Oral, Daily, Amina Aparicio MD, 200 mg at 12/28/19 0819    tiotropium (SPIRIVA) capsule for inhaler 18 mcg, 18 mcg, Inhalation, Daily, Amina Aparicio MD, 18 mcg at 12/28/19 0820    traZODone (DESYREL) tablet 25 mg, 25 mg, Oral, HS PRN, Amina Aparicio MD    Current Problem List:    Patient Active Problem List   Diagnosis    Sepsis ([facility]    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[address] )    Tobacco use disorder, continuous    Bipolar disorder ([facility] )    Left hip pain    Lactic acidosis    Hypokalemia    Hypomagnesemia    Compression fracture of L4 lumbar vertebra    Thoracic compression fracture (HCC)    Ventral hernia    Parapneumonic effusion    Acute on chronic respiratory failure with hypoxia (HCC)    Chronic respiratory failure ([facility] )    Hypophosphatemia    Elevated MCV    Schizoaffective disorder, bipolar type (HCC)    Acquired hypothyroidism    Gastroesophageal reflux disease without esophagitis    Abnormal CT of the chest    Excessive cerumen in left ear canal    Lipoma of right upper extremity    Polydipsia    Localized swelling of both lower legs    Noncompliant with deep vein thrombosis (DVT) prophylaxis    Allergic conjunctivitis of right eye    At risk for aspiration    Ear ache       Problem list reviewed 12/28/19     Objective:     Vital Signs:  Vitals:    12/27/19 1606 12/27/19 2007 12/28/19 0635 12/28/19 0730   BP: 97/62 98/53  102/58   BP Location: Left arm Left arm  Right arm   Pulse: 74 69  75   Resp: 18 18  18   Temp: (!) 97 2 °F (36 2 °C) (!) 97 3 °F (36 3 °C)  (!) 97 2 °F (36 2 °C)   TempSrc: Temporal Temporal     SpO2: 94% 94% 94%    Weight:       Height:             Appearance:  age appropriate and casually dressed   Behavior:  guarded   Speech:  normal volume   Mood:  anxious   Affect:  constricted   Thought Process:  goal directed   Thought Content:  delusions  somatic   Perceptual Disturbances: None   Risk Potential: none   Sensorium:  person, place, situation and time   Cognition:  intact   Consciousness:  alert and awake    Attention: attention span and concentration were age appropriate   Intellect: average   Insight:  fair   Judgment: fair      Motor Activity: no abnormal movements       I/O Past 24 hours:  No intake/output data recorded  No intake/output data recorded  Labs:  Reviewed 12/28/19    Progress Toward Goals:  Unchanged    Assessment / Plan:     Schizoaffective disorder, bipolar type ([facility] )    Recommended Treatment:      Medication changes:  1) continue current medication regimen  Non-pharmacological treatments  1) Continue with group therapy, milieu therapy and occupational therapy  Safety  1) Safety/communication plan established targeting dynamic risk factors above      2) Risks, benefits, and possible side effects of medications explained to patient and patient verbalizes understanding  Counseling / Coordination of Care    Total floor / unit time spent today 20 minutes  Greater than 50% of total time was spent with the patient and / or family counseling and / or coordination of care  A description of the counseling / coordination of care  Patient's Rights, confidentiality and exceptions to confidentiality, use of automated medical record, [facility] staff access to medical record, and consent to treatment reviewed      Mena Blunt PA-C

## 2019-12-28 NOTE — PLAN OF CARE
Problem: Alteration in Thoughts and Perception  Goal: Verbalize thoughts and feelings  Description  Interventions:  - Promote a nonjudgmental and trusting relationship with the patient through active listening and therapeutic communication  - Assess patient's level of functioning, behavior and potential for risk  - Engage patient in 1 on 1 interactions for a minimum of 15 minutes each session  - Encourage patient to express fears, feelings, frustrations, and discuss symptoms    - [place] patient to reality, help patient recognize reality-based thinking   - Administer medications as ordered and assess for potential side effects  - Provide the patient education related to the signs and symptoms of the illness and desired effects of prescribed medications  Outcome: Progressing  Goal: Agree to be compliant with medication regime, as prescribed and report medication side effects  Description  Interventions:  - Offer appropriate PRN medication and supervise ingestion; conduct aims, as needed   Outcome: Progressing  Goal: Attend and participate in unit activities, including therapeutic, recreational, and educational groups  Description  Interventions:  - Provide therapeutic and educational activities daily, encourage attendance and participation, and document same in the medical record     CERTIFIED PEER SPECIALIST INTERVENTIONS:    Complete peer assessment with patient to assess their needs and identify their goals to complete while in the recovery program as well as once discharged into the community  Patient will complete WRAP Plan, Crisis Plan and 5 Life Domains  Patient will attend 50% of groups offered on the unit  Patient will complete a goal card weekly      Outcome: Progressing     Problem: Ineffective Coping  Goal: Participates in unit activities  Description  Interventions:  - Provide therapeutic environment   - Provide required programming   - Redirect inappropriate behaviors   Outcome: Progressing  Goal: Patient/Family verbalizes awareness of resources  Outcome: Progressing  Goal: Understands least restrictive measures  Description  Interventions:  - Utilize least restrictive behavior  Outcome: Progressing     Problem: Risk for Self Injury/Neglect  Goal: Treatment Goal: Remain safe during length of stay, learn and adopt new coping skills, and be free of self-injurious ideation, impulses and acts at the time of discharge  Outcome: Progressing  Goal: Verbalize thoughts and feelings  Description  Interventions:  - Assess and re-assess patient's lethality and potential for self-injury  - Engage patient in 1:1 interactions, daily, for a minimum of 15 minutes  - Encourage patient to express feelings, fears, frustrations, hopes  - Establish rapport/trust with patient   Outcome: Progressing  Goal: Refrain from harming self  Description  Interventions:  - Monitor patient closely, per order  - Develop a trusting relationship  - Supervise medication ingestion, monitor effects and side effects   Outcome: Progressing  Goal: Attend and participate in unit activities, including therapeutic, recreational, and educational groups  Description  Interventions:  - Provide therapeutic and educational activities daily, encourage attendance and participation, and document same in the medical record  - Obtain collateral information, encourage visitation and family involvement in care   Outcome: Progressing     Problem: Depression  Goal: Treatment Goal: Demonstrate behavioral control of depressive symptoms, verbalize feelings of improved mood/affect, and adopt new coping skills prior to discharge  Outcome: Progressing  Goal: Verbalize thoughts and feelings  Description  Interventions:  - Assess and re-assess patient's level of risk   - Engage patient in 1:1 interactions, daily, for a minimum of 15 minutes   - Encourage patient to express feelings, fears, frustrations, hopes   Outcome: Progressing  Goal: Refrain from isolation  Description  Interventions:  - Develop a trusting relationship   - Encourage socialization   Outcome: Progressing     Problem: Anxiety  Goal: Anxiety is at manageable level  Description  Interventions:  - Assess and monitor patient's anxiety level  - Monitor for signs and symptoms of anxiety both physical and emotional (heart palpitations, chest pain, shortness of breath, headaches, nausea, feeling jumpy, restlessness, irritable, apprehensive)  - Collaborate with interdisciplinary team and initiate plan and interventions as ordered    - [place] patient to unit/surroundings  - Explain treatment plan  - Encourage participation in care  - Encourage verbalization of concerns/fears  - Identify coping mechanisms  - Assist in developing anxiety-reducing skills  - Administer/offer alternative therapies  - Limit or eliminate stimulants  Outcome: Progressing     Problem: Alteration in Orientation  Goal: Interact with staff daily  Description  Interventions:  - Assess and re-assess patient's level of orientation  - Engage patient in 1 on 1 interactions, daily, for a minimum of 15 minutes   - Establish rapport/trust with patient   Outcome: Progressing     Problem: PAIN - ADULT  Goal: Verbalizes/displays adequate comfort level or baseline comfort level  Description  Interventions:  - Encourage patient to monitor pain and request assistance  - Assess pain using appropriate pain scale  - Administer analgesics based on type and severity of pain and evaluate response  - Implement non-pharmacological measures as appropriate and evaluate response  - Consider cultural and social influences on pain and pain management  - Notify physician/advanced practitioner if interventions unsuccessful or patient reports new pain  Outcome: Progressing     Problem: SAFETY ADULT  Goal: Patient will remain free of falls  Description  INTERVENTIONS:  - Assess patient frequently for physical needs  -  Identify cognitive and physical deficits and behaviors that affect risk of falls  -  [place] fall precautions as indicated by assessment   - Educate patient/family on patient safety including physical limitations  - Instruct patient to call for assistance with activity based on assessment  - Modify environment to reduce risk of injury  - Consider OT/PT consult to assist with strengthening/mobility  Outcome: Progressing     Problem: Nutrition/Hydration-ADULT  Goal: Nutrient/Hydration intake appropriate for improving, restoring or maintaining nutritional needs  Description  Monitor and assess patient's nutrition/hydration status for malnutrition  Collaborate with interdisciplinary team and initiate plan and interventions as ordered  Monitor patient's weight and dietary intake as ordered or per policy  Utilize nutrition screening tool and intervene as necessary  Determine patient's food preferences and provide high-protein, high-caloric foods as appropriate       INTERVENTIONS:  - Monitor oral intake, urinary output, labs, and treatment plans  - Assess nutrition and hydration status and recommend course of action  - Evaluate amount of meals eaten  - Assist patient with eating if necessary   - Allow adequate time for meals  - Recommend/ encourage appropriate diets, oral nutritional supplements, and vitamin/mineral supplements  - Order, calculate, and assess calorie counts as needed  - Recommend, monitor, and adjust tube feedings and TPN/PPN based on assessed needs  - Assess need for intravenous fluids  - Provide specific nutrition/hydration education as appropriate  - Include patient/family/caregiver in decisions related to nutrition  Outcome: Progressing     Problem: Alteration in Thoughts and Perception  Goal: Complete daily ADLs, including personal hygiene independently, as able  Description  Interventions:  - Observe, teach, and assist patient with ADLS  - Monitor and promote a balance of rest/activity, with adequate nutrition and elimination   Outcome: Not Progressing     Problem: Risk for Self Injury/Neglect  Goal: Complete daily ADLs, including personal hygiene independently, as able  Description  Interventions:  - Observe, teach, and assist patient with ADLS  - Monitor and promote a balance of rest/activity, with adequate nutrition and elimination  Outcome: Not Progressing     Problem: Depression  Goal: Refrain from self-neglect  Outcome: Not Progressing   Mostly isolative to her room, but out for meds, meals, and attended journaling and nutrition groups  Disheveled and did not shower or do hygiene  Ate 50% of breakfast and 25% of lunch  No somatic complaints voiced  Refused Debrox drops  No issues noted  Continue to monitor

## 2019-12-28 NOTE — PLAN OF CARE
Problem: Alteration in Thoughts and Perception  Goal: Attend and participate in unit activities, including therapeutic, recreational, and educational groups  Description  Interventions:  - Provide therapeutic and educational activities daily, encourage attendance and participation, and document same in the medical record     CERTIFIED PEER SPECIALIST INTERVENTIONS:    Complete peer assessment with patient to assess their needs and identify their goals to complete while in the recovery program as well as once discharged into the community  Patient will complete WRAP Plan, Crisis Plan and 5 Life Domains  Patient will attend 50% of groups offered on the unit  Patient will complete a goal card weekly      Outcome: Progressing     Problem: Ineffective Coping  Goal: Participates in unit activities  Description  Interventions:  - Provide therapeutic environment   - Provide required programming   - Redirect inappropriate behaviors   Outcome: Progressing     Problem: Risk for Self Injury/Neglect  Goal: Treatment Goal: Remain safe during length of stay, learn and adopt new coping skills, and be free of self-injurious ideation, impulses and acts at the time of discharge  Outcome: Progressing  Goal: Refrain from harming self  Description  Interventions:  - Monitor patient closely, per order  - Develop a trusting relationship  - Supervise medication ingestion, monitor effects and side effects   Outcome: Progressing  Goal: Attend and participate in unit activities, including therapeutic, recreational, and educational groups  Description  Interventions:  - Provide therapeutic and educational activities daily, encourage attendance and participation, and document same in the medical record  - Obtain collateral information, encourage visitation and family involvement in care   Outcome: Progressing     Problem: PAIN - ADULT  Goal: Verbalizes/displays adequate comfort level or baseline comfort level  Description  Interventions:  - Encourage patient to monitor pain and request assistance  - Assess pain using appropriate pain scale  - Administer analgesics based on type and severity of pain and evaluate response  - Implement non-pharmacological measures as appropriate and evaluate response  - Consider cultural and social influences on pain and pain management  - Notify physician/advanced practitioner if interventions unsuccessful or patient reports new pain  Outcome: Progressing     Problem: SAFETY ADULT  Goal: Patient will remain free of falls  Description  INTERVENTIONS:  - Assess patient frequently for physical needs  -  Identify cognitive and physical deficits and behaviors that affect risk of falls  -  [place] fall precautions as indicated by assessment   - Educate patient/family on patient safety including physical limitations  - Instruct patient to call for assistance with activity based on assessment  - Modify environment to reduce risk of injury  - Consider OT/PT consult to assist with strengthening/mobility  Outcome: Progressing     Problem: Nutrition/Hydration-ADULT  Goal: Nutrient/Hydration intake appropriate for improving, restoring or maintaining nutritional needs  Description  Monitor and assess patient's nutrition/hydration status for malnutrition  Collaborate with interdisciplinary team and initiate plan and interventions as ordered  Monitor patient's weight and dietary intake as ordered or per policy  Utilize nutrition screening tool and intervene as necessary  Determine patient's food preferences and provide high-protein, high-caloric foods as appropriate       INTERVENTIONS:  - Monitor oral intake, urinary output, labs, and treatment plans  - Assess nutrition and hydration status and recommend course of action  - Evaluate amount of meals eaten  - Assist patient with eating if necessary   - Allow adequate time for meals  - Recommend/ encourage appropriate diets, oral nutritional supplements, and vitamin/mineral supplements  - Order, calculate, and assess calorie counts as needed  - Recommend, monitor, and adjust tube feedings and TPN/PPN based on assessed needs  - Assess need for intravenous fluids  - Provide specific nutrition/hydration education as appropriate  - Include patient/family/caregiver in decisions related to nutrition  Outcome: Progressing   Mostly isolative to her room, but out for meds, meals, and attended IMR group  Disheveled and did not shower or do hygiene  Ate 25% of breakfast and 50% of lunch  No somatic complaints voiced  Refused Debrox drops "I don't need them at this time"  No issues noted  Continue to monitor

## 2019-12-28 NOTE — PROGRESS NOTES
[id number]land[address]ini is concerned about having O2 available to her when goes on pass if she should need it  Says the portable air compressor she came in with was a rental & taken back by the company  She wants to be sure has such equipment for pass & upon discharge  "I'm always supposed to have O2 available to me IF I need it " This nurse will e-mail the  about this concern, discuss some staff suggested options for the pass such as send along inhaler, use ER if becomes distressed

## 2019-12-29 NOTE — PROGRESS NOTES
Psychiatry Progress Note    Subjective: Interval History     The patient is lying in bed this morning  She states that she did attend 3 groups yesterday which went well  She states she wants to continue going to groups  Patient states she has a team meeting tomorrow  Patient rates her depression as a 6/10 and anxiety as a 2/4  She continues to be medication compliant  Appetite is fair  No behavioral issues noted per staff  She continues to appear disheveled and has poor hygiene      Behavior over the last 24 hours:  unchanged  Sleep: normal  Appetite: normal  Medication side effects: No  ROS: no complaints    Current medications:    Current Facility-Administered Medications:     acetaminophen (TYLENOL) tablet 650 mg, 650 mg, Oral, Q6H PRN, Jill Gayle MD    albuterol (PROVENTIL HFA,VENTOLIN HFA) inhaler 2 puff, 2 puff, Inhalation, Q4H PRN, Jill Gayle MD, 2 puff at 10/11/19 0424    aluminum-magnesium hydroxide-simethicone (MYLANTA) 200-200-20 mg/5 mL oral suspension 15 mL, 15 mL, Oral, Q4H PRN, Jill Gayle MD    ammonium lactate (LAC-HYDRIN) 12 % lotion 1 application, 1 application, Topical, BID PRN, Jill Gayle MD    benzonatate (TESSALON PERLES) capsule 100 mg, 100 mg, Oral, TID PRN, Jill Gayle MD    benztropine (COGENTIN) injection 1 mg, 1 mg, Intramuscular, Q8H PRN, Jill Gayle MD    carbamide peroxide (DEBROX) 6 5 % otic solution 5 drop, 5 drop, Left Ear, BID, Rona Mcintosh MD, 5 drop at 12/28/19 2007    cloZAPine (CLOZARIL) tablet 25 mg, 25 mg, Oral, BID, Jill Gayle MD, 25 mg at 12/28/19 2104    cloZAPine (CLOZARIL) tablet 50 mg, 50 mg, Oral, BID, Jill Gayle MD, 50 mg at 12/28/19 2100    EPINEPHrine PF (ADRENALIN) 1 mg/mL injection 0 15 mg, 0 15 mg, Intramuscular, Once PRN, Jill Gayle MD    fluticasone-vilanterol (BREO ELLIPTA) 200-25 MCG/INH inhaler 1 puff, 1 puff, Inhalation, Daily, Jill Gayle MD, 1 puff at 12/29/19 0828    ketotifen (ZADITOR) 0 025 % ophthalmic solution 1 drop, 1 drop, Right Eye, BID PRN, Greer Beltran MD    levothyroxine tablet 125 mcg, 125 mcg, Oral, Early Morning, Greer Beltran MD, 125 mcg at 12/29/19 0618    magnesium hydroxide (MILK OF MAGNESIA) 400 mg/5 mL oral suspension 30 mL, 30 mL, Oral, Daily PRN, Greer Beltran MD    montelukast (SINGULAIR) tablet 10 mg, 10 mg, Oral, HS, Greer Beltran MD, 10 mg at 12/24/19 2109    OLANZapine (ZyPREXA) IM injection 5 mg, 5 mg, Intramuscular, Q8H PRN, Greer Beltran MD    OLANZapine (ZyPREXA) tablet 5 mg, 5 mg, Oral, Q8H PRN, Greer Beltran MD    ondansetron (ZOFRAN-ODT) dispersible tablet 4 mg, 4 mg, Oral, Q6H PRN, Greer Beltran MD, 4 mg at 12/09/19 1757    pantoprazole (PROTONIX) EC tablet 40 mg, 40 mg, Oral, Early Morning, Greer Beltran MD, 40 mg at 12/29/19 0618    polyethylene glycol (MIRALAX) packet 17 g, 17 g, Oral, Daily PRN, Greer Beltran MD    polyvinyl alcohol (LIQUIFILM TEARS) 1 4 % ophthalmic solution 1 drop, 1 drop, Both Eyes, Q3H PRN, Greer Beltran MD    sertraline (ZOLOFT) tablet 50 mg, 50 mg, Oral, BID, Greer Beltran MD, 50 mg at 12/29/19 9817    sucralfate (CARAFATE) oral suspension 1,000 mg, 1,000 mg, Oral, BID, Kiya Neves MD, 1,000 mg at 12/29/19 0617    theophylline (JEF-24) 24 hr capsule 200 mg, 200 mg, Oral, Daily, Greer Beltran MD, 200 mg at 12/29/19 5528    tiotropium (SPIRIVA) capsule for inhaler 18 mcg, 18 mcg, Inhalation, Daily, Greer Beltran MD, 18 mcg at 12/29/19 6016    traZODone (DESYREL) tablet 25 mg, 25 mg, Oral, HS PRN, Greer Beltran MD    Current Problem List:    Patient Active Problem List   Diagnosis    Sepsis ([facility]    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[facility]    Tobacco use disorder, continuous    Bipolar disorder ([facility]    Left hip pain    Lactic acidosis    Hypokalemia    Hypomagnesemia    Compression fracture of L4 lumbar vertebra    Thoracic compression fracture (HCC)    Ventral hernia    Parapneumonic effusion    Acute on chronic respiratory failure with hypoxia (HCC)    Chronic respiratory failure (HCC)    Hypophosphatemia    Elevated MCV    Schizoaffective disorder, bipolar type (HCC)    Acquired hypothyroidism    Gastroesophageal reflux disease without esophagitis    Abnormal CT of the chest    Excessive cerumen in left ear canal    Lipoma of right upper extremity    Polydipsia    Localized swelling of both lower legs    Noncompliant with deep vein thrombosis (DVT) prophylaxis    Allergic conjunctivitis of right eye    At risk for aspiration    Ear ache       Problem list reviewed 12/29/19     Objective:     Vital Signs:  Vitals:    12/28/19 1947 12/28/19 2202 12/29/19 0730 12/29/19 0732   BP: 96/60  125/71 114/64   BP Location: Left arm  Left arm Left arm   Pulse: 90  91 78   Resp: 15  16    Temp: (!) 97 4 °F (36 3 °C)  98 °F (36 7 °C)    TempSrc: Temporal  Temporal    SpO2: 90% 92%     Weight:   66 kg (145 lb 9 6 oz)    Height:             Appearance:  age appropriate and casually dressed   Behavior:  guarded   Speech:  normal volume   Mood:  anxious   Affect:  constricted   Thought Process:  goal directed   Thought Content:  delusions  somatic   Perceptual Disturbances: None   Risk Potential: none   Sensorium:  person, place, situation and time   Cognition:  intact   Consciousness:  alert and awake    Attention: attention span and concentration were age appropriate   Intellect: average   Insight:  fair   Judgment: fair      Motor Activity: no abnormal movements       I/O Past 24 hours:  No intake/output data recorded  No intake/output data recorded  Labs:  Reviewed 12/29/19    Progress Toward Goals:  Unchanged    Assessment / Plan:     [facility]    Recommended Treatment:      Medication changes:  1) continue current medication regimen  Non-pharmacological treatments  1) Continue with group therapy, milieu therapy and occupational therapy      Safety  1) Safety/communication plan established targeting dynamic risk factors above  2) Risks, benefits, and possible side effects of medications explained to patient and patient verbalizes understanding  Counseling / Coordination of Care    Total floor / unit time spent today 20 minutes  Greater than 50% of total time was spent with the patient and / or family counseling and / or coordination of care  A description of the counseling / coordination of care  Patient's Rights, confidentiality and exceptions to confidentiality, use of automated medical record, J[facility] Part[facility] staff access to medical record, and consent to treatment reviewed      Carissa Murillo PA-C

## 2019-12-29 NOTE — PROGRESS NOTES
Progress Note - Roselia Woody 1955, 60 y o  female MRN: [id number]    [facility] Encounter: [id number]    Primary Care Provider: Deeanna Apley, PA-C   Date and time admitted to hospital: 7/23/2019  5:30 PM        Ear ache  Assessment & Plan  No complain    At risk for aspiration  Assessment & Plan  Denied any choking or any problem    Gastroesophageal reflux disease without esophagitis  Assessment & Plan  Seen by GI no complain now    Acquired hypothyroidism  Assessment & Plan  Continue Synthroid denied any complaint    Left hip pain  Assessment & Plan  Being of pain in left hip requesting Burak [place] apply locally  Will order Burak  for her also on p r n  Tylenol    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[facility] )  Assessment & Plan  No cough no shortness of breath  Stable pulmonary status    * Schizoaffective disorder, bipolar type [facility])  Assessment & Plan  Treatment per Psychiatry                        VTE Pharmacologic Prophylaxis:   Pharmacologic: Patient in psychiatric  Mechanical VTE Prophylaxis in Place: No    Patient Centered Rounds: I have performed bedside rounds with nursing staff today  Discussions with Specialists or Other Care Team Provider:  None    Education and Discussions with Family / Patient:  Discussed with patient requesting to lie Burak  on left hip it is hurting    Time Spent for Care: 30 minutes  More than 50% of total time spent on counseling and coordination of care as described above      Current Length of Stay: 159 day(s)    Current Patient Status: Psych - Long Term   Certification Statement: The patient will continue to require additional inpatient hospital stay due to Psychiatric    Discharge Plan:  psychiatrist    Code Status: Level 1 - Full Code      Subjective:   Complaining of left hip pain she had injury before but did not need any surgical intervention mild discomfort casting Ben[place] to apply she said that helped no localized tenderness no deformity no chest pain or shortness of breath doing fairly well    Objective:     Vitals:   Temp (24hrs), Av 6 °F (36 4 °C), Min:97 4 °F (36 3 °C), Max:98 °F (36 7 °C)    Temp:  [97 4 °F (36 3 °C)-98 °F (36 7 °C)] 98 °F (36 7 °C)  HR:  [78-93] 78  Resp:  [14-16] 16  BP: ()/(60-71) 114/64  SpO2:  [90 %-95 %] 92 %  Body mass index is 24 99 kg/m²  Input and Output Summary (last 24 hours):     No intake or output data in the 24 hours ending 19 1579    Physical Exam:     Physical Exam   Constitutional: She is oriented to person, place, and time  She appears well-developed and well-nourished  HENT:   Head: Normocephalic and atraumatic  Right Ear: External ear normal    Left Ear: External ear normal    Mouth/Throat: Oropharynx is clear and moist    Eyes: Pupils are equal, round, and reactive to light  Conjunctivae and EOM are normal    Neck: Normal range of motion  Neck supple  Cardiovascular: Normal rate, regular rhythm, normal heart sounds and intact distal pulses  Pulmonary/Chest: Effort normal and breath sounds normal    Abdominal: Soft  Bowel sounds are normal  She exhibits no mass  There is no tenderness  There is no rebound and no guarding  Genitourinary:   Genitourinary Comments: deferred   Musculoskeletal: Normal range of motion  Pain in left hip no acute cough tenderness no guarding no deformity present   Neurological: She is alert and oriented to person, place, and time  She has normal reflexes  Cranial nerves 2-12 are normal   Normal neurological exam   Skin: Skin is warm and dry  No rash noted  Psychiatric: She has a normal mood and affect  Nursing note and vitals reviewed  Additional Data:     Labs:    Results from last 7 days   Lab Units 19  0607   WBC Thousand/uL 6 60   HEMOGLOBIN g/dL 13 7   HEMATOCRIT % 42 1   PLATELETS Thousands/uL 282   NEUTROS PCT % 53   LYMPHS PCT % 34   MONOS PCT % 11*   EOS PCT % 2                               * I Have Reviewed All Lab Data Listed Above    * Additional Pertinent Lab Tests Reviewed: [address] Admission Reviewed    Imaging:    Imaging Reports Reviewed Today Include:  None today  Imaging Personally Reviewed by Myself Includes:  None    Recent Cultures (last 7 days):           Last 24 Hours Medication List:     Current Facility-Administered Medications:  acetaminophen 650 mg Oral Q6H PRN Deedee Muir MD   albuterol 2 puff Inhalation Q4H PRN Deedee Muir MD   aluminum-magnesium hydroxide-simethicone 15 mL Oral Q4H PRN Deedee Muir MD   ammonium lactate 1 application Topical BID PRN Deedee Muir MD   benzonatate 100 mg Oral TID PRN Deedee Muir MD   benztropine 1 mg Intramuscular Q8H PRN Deedee Muir MD   carbamide peroxide 5 drop Left Ear BID Rona Clinton MD   cloZAPine 25 mg Oral BID Deedee Muir MD   cloZAPine 50 mg Oral BID Deedee Muir MD   EPINEPHrine PF 0 15 mg Intramuscular Once PRN Deedee Muir MD   fluticasone-vilanterol 1 puff Inhalation Daily Deedee Muir MD   ketotifen 1 drop Right Eye BID PRN Deedee Muir MD   levothyroxine 125 mcg Oral Early Morning Deedee Muir MD   magnesium hydroxide 30 mL Oral Daily PRN Deedee Muir MD   montelukast 10 mg Oral HS Deedee Muir MD   OLANZapine 5 mg Intramuscular Q8H PRN Deedee Muir MD   OLANZapine 5 mg Oral Q8H PRN Deedee Muir MD   ondansetron 4 mg Oral Q6H PRN Deedee Muir MD   pantoprazole 40 mg Oral Early Morning Deedee Muir MD   polyethylene glycol 17 g Oral Daily PRN Deedee Muir MD   polyvinyl alcohol 1 drop Both Eyes Q3H PRN Deedee Muir MD   sertraline 50 mg Oral BID Deedee Muir MD   sucralfate 1,000 mg Oral BID Juan Carlos Merchant MD   theophylline 200 mg Oral Daily Deedee Muir MD   tiotropium 18 mcg Inhalation Daily Deedee Muir MD   traZODone 25 mg Oral HS PRN Deedee Muir MD        Today, Patient Was Seen By: Mariajose White MD    ** Please Note: Dictation voice to text software may have been used in the creation of this document   **

## 2019-12-29 NOTE — PLAN OF CARE
Problem: Alteration in Thoughts and Perception  Goal: Verbalize thoughts and feelings  Description  Interventions:  - Promote a nonjudgmental and trusting relationship with the patient through active listening and therapeutic communication  - Assess patient's level of functioning, behavior and potential for risk  - Engage patient in 1 on 1 interactions for a minimum of 15 minutes each session  - Encourage patient to express fears, feelings, frustrations, and discuss symptoms    - [place] patient to reality, help patient recognize reality-based thinking   - Administer medications as ordered and assess for potential side effects  - Provide the patient education related to the signs and symptoms of the illness and desired effects of prescribed medications  Outcome: Progressing  Goal: Attend and participate in unit activities, including therapeutic, recreational, and educational groups  Description  Interventions:  - Provide therapeutic and educational activities daily, encourage attendance and participation, and document same in the medical record     CERTIFIED PEER SPECIALIST INTERVENTIONS:    Complete peer assessment with patient to assess their needs and identify their goals to complete while in the recovery program as well as once discharged into the community  Patient will complete WRAP Plan, Crisis Plan and 5 Life Domains  Patient will attend 50% of groups offered on the unit  Patient will complete a goal card weekly  Outcome: Progressing     Problem: Anxiety  Goal: Anxiety is at manageable level  Description  Interventions:  - Assess and monitor patient's anxiety level  - Monitor for signs and symptoms of anxiety both physical and emotional (heart palpitations, chest pain, shortness of breath, headaches, nausea, feeling jumpy, restlessness, irritable, apprehensive)  - Collaborate with interdisciplinary team and initiate plan and interventions as ordered    - [place] patient to unit/surroundings  - Explain treatment plan  - Encourage participation in care  - Encourage verbalization of concerns/fears  - Identify coping mechanisms  - Assist in developing anxiety-reducing skills  - Administer/offer alternative therapies  - Limit or eliminate stimulants  Outcome: Progressing     Problem: Alteration in Orientation  Goal: Interact with staff daily  Description  Interventions:  - Assess and re-assess patient's level of orientation  - Engage patient in 1 on 1 interactions, daily, for a minimum of 15 minutes   - Establish rapport/trust with patient   Outcome: Progressing     Problem: Alteration in Thoughts and Perception  Goal: Complete daily ADLs, including personal hygiene independently, as able  Description  Interventions:  - Observe, teach, and assist patient with ADLS  - Monitor and promote a balance of rest/activity, with adequate nutrition and elimination   Outcome: Not Progressing     Problem: Depression  Goal: Refrain from isolation  Description  Interventions:  - Develop a trusting relationship   - Encourage socialization   Outcome: Not Progressing     Danie Steiner was in her room, laying in bed, at the beginning of the shift  Pleasant and cooperative upon approach  Social with select peers  Visible at times sitting in the chair by the phone  She has not been somatic  Incentive spirometer encourage and she was able to achieve 1250 ml's for 8 breaths  Took medications with prompting  Ate 50% of her meal and drank 100% of her Ensure  No difficulty swallowing  Did not attend evening group due to being asleep  Prompted several times to get HS medications  Did not eat snack  Oxygen saturation 91% prior to oxygen 1 liter via nasal cannula being applied at HS  Continue to monitor  Precautions maintained

## 2019-12-29 NOTE — PLAN OF CARE
Problem: Alteration in Thoughts and Perception  Goal: Treatment Goal: Gain control of psychotic behaviors/thinking, reduce/eliminate presenting symptoms and demonstrate improved reality functioning upon discharge  Outcome: Progressing  Goal: Verbalize thoughts and feelings  Description  Interventions:  - Promote a nonjudgmental and trusting relationship with the patient through active listening and therapeutic communication  - Assess patient's level of functioning, behavior and potential for risk  - Engage patient in 1 on 1 interactions for a minimum of 15 minutes each session  - Encourage patient to express fears, feelings, frustrations, and discuss symptoms    - [place] patient to reality, help patient recognize reality-based thinking   - Administer medications as ordered and assess for potential side effects  - Provide the patient education related to the signs and symptoms of the illness and desired effects of prescribed medications  Outcome: Progressing  Goal: Agree to be compliant with medication regime, as prescribed and report medication side effects  Description  Interventions:  - Offer appropriate PRN medication and supervise ingestion; conduct aims, as needed   Outcome: Progressing  Goal: Attend and participate in unit activities, including therapeutic, recreational, and educational groups  Description  Interventions:  - Provide therapeutic and educational activities daily, encourage attendance and participation, and document same in the medical record     CERTIFIED PEER SPECIALIST INTERVENTIONS:    Complete peer assessment with patient to assess their needs and identify their goals to complete while in the recovery program as well as once discharged into the community  Patient will complete WRAP Plan, Crisis Plan and 5 Life Domains  Patient will attend 50% of groups offered on the unit  Patient will complete a goal card weekly      Outcome: Progressing     Problem: Ineffective Coping  Goal: Participates in unit activities  Description  Interventions:  - Provide therapeutic environment   - Provide required programming   - Redirect inappropriate behaviors   Outcome: Progressing  Goal: Patient/Family verbalizes awareness of resources  Outcome: Progressing  Goal: Understands least restrictive measures  Description  Interventions:  - Utilize least restrictive behavior  Outcome: Progressing     Problem: Risk for Self Injury/Neglect  Goal: Treatment Goal: Remain safe during length of stay, learn and adopt new coping skills, and be free of self-injurious ideation, impulses and acts at the time of discharge  Outcome: Progressing  Goal: Verbalize thoughts and feelings  Description  Interventions:  - Assess and re-assess patient's lethality and potential for self-injury  - Engage patient in 1:1 interactions, daily, for a minimum of 15 minutes  - Encourage patient to express feelings, fears, frustrations, hopes  - Establish rapport/trust with patient   Outcome: Progressing  Goal: Refrain from harming self  Description  Interventions:  - Monitor patient closely, per order  - Develop a trusting relationship  - Supervise medication ingestion, monitor effects and side effects   Outcome: Progressing  Goal: Attend and participate in unit activities, including therapeutic, recreational, and educational groups  Description  Interventions:  - Provide therapeutic and educational activities daily, encourage attendance and participation, and document same in the medical record  - Obtain collateral information, encourage visitation and family involvement in care   Outcome: Progressing     Problem: Depression  Goal: Treatment Goal: Demonstrate behavioral control of depressive symptoms, verbalize feelings of improved mood/affect, and adopt new coping skills prior to discharge  Outcome: Progressing  Goal: Verbalize thoughts and feelings  Description  Interventions:  - Assess and re-assess patient's level of risk   - Engage patient in 1:1 interactions, daily, for a minimum of 15 minutes   - Encourage patient to express feelings, fears, frustrations, hopes   Outcome: Progressing     Problem: Anxiety  Goal: Anxiety is at manageable level  Description  Interventions:  - Assess and monitor patient's anxiety level  - Monitor for signs and symptoms of anxiety both physical and emotional (heart palpitations, chest pain, shortness of breath, headaches, nausea, feeling jumpy, restlessness, irritable, apprehensive)  - Collaborate with interdisciplinary team and initiate plan and interventions as ordered    - [place] patient to unit/surroundings  - Explain treatment plan  - Encourage participation in care  - Encourage verbalization of concerns/fears  - Identify coping mechanisms  - Assist in developing anxiety-reducing skills  - Administer/offer alternative therapies  - Limit or eliminate stimulants  Outcome: Progressing     Problem: Alteration in Orientation  Goal: Interact with staff daily  Description  Interventions:  - Assess and re-assess patient's level of orientation  - Engage patient in 1 on 1 interactions, daily, for a minimum of 15 minutes   - Establish rapport/trust with patient   Outcome: Progressing     Problem: PAIN - ADULT  Goal: Verbalizes/displays adequate comfort level or baseline comfort level  Description  Interventions:  - Encourage patient to monitor pain and request assistance  - Assess pain using appropriate pain scale  - Administer analgesics based on type and severity of pain and evaluate response  - Implement non-pharmacological measures as appropriate and evaluate response  - Consider cultural and social influences on pain and pain management  - Notify physician/advanced practitioner if interventions unsuccessful or patient reports new pain  Outcome: Progressing     Problem: SAFETY ADULT  Goal: Patient will remain free of falls  Description  INTERVENTIONS:  - Assess patient frequently for physical needs  -  Identify cognitive and physical deficits and behaviors that affect risk of falls  -  [place] fall precautions as indicated by assessment   - Educate patient/family on patient safety including physical limitations  - Instruct patient to call for assistance with activity based on assessment  - Modify environment to reduce risk of injury  - Consider OT/PT consult to assist with strengthening/mobility  Outcome: Progressing   Mostly isolative to her room, but out for meds, meals, and attended nutrition group  Disheveled and did not shower or do hygiene  Ate 75% of breakfast consisting of yogurt, oatmeal, juice, and coffee  Did not eat lunch but had eaten a large bowl of chili with cornbread during the nutrition group  No somatic complaints voiced  Refused Debrox drops  Medical doctor rounded but patient refused assessment / exam stating that she would rather only be [facility]  No other behaviors or issues noted  Continue to monitor

## 2019-12-29 NOTE — PLAN OF CARE
Problem: Alteration in Thoughts and Perception  Goal: Verbalize thoughts and feelings  Description  Interventions:  - Promote a nonjudgmental and trusting relationship with the patient through active listening and therapeutic communication  - Assess patient's level of functioning, behavior and potential for risk  - Engage patient in 1 on 1 interactions for a minimum of 15 minutes each session  - Encourage patient to express fears, feelings, frustrations, and discuss symptoms    - [place] patient to reality, help patient recognize reality-based thinking   - Administer medications as ordered and assess for potential side effects  - Provide the patient education related to the signs and symptoms of the illness and desired effects of prescribed medications  Outcome: Progressing     Problem: Risk for Self Injury/Neglect  Goal: Refrain from harming self  Description  Interventions:  - Monitor patient closely, per order  - Develop a trusting relationship  - Supervise medication ingestion, monitor effects and side effects   Outcome: Progressing     Problem: Anxiety  Goal: Anxiety is at manageable level  Description  Interventions:  - Assess and monitor patient's anxiety level  - Monitor for signs and symptoms of anxiety both physical and emotional (heart palpitations, chest pain, shortness of breath, headaches, nausea, feeling jumpy, restlessness, irritable, apprehensive)  - Collaborate with interdisciplinary team and initiate plan and interventions as ordered    - [place] patient to unit/surroundings  - Explain treatment plan  - Encourage participation in care  - Encourage verbalization of concerns/fears  - Identify coping mechanisms  - Assist in developing anxiety-reducing skills  - Administer/offer alternative therapies  - Limit or eliminate stimulants  Outcome: Progressing     Problem: Alteration in Thoughts and Perception  Goal: Attend and participate in unit activities, including therapeutic, recreational, and educational groups  Description  Interventions:  - Provide therapeutic and educational activities daily, encourage attendance and participation, and document same in the medical record     CERTIFIED PEER SPECIALIST INTERVENTIONS:    Complete peer assessment with patient to assess their needs and identify their goals to complete while in the recovery program as well as once discharged into the community  Patient will complete WRAP Plan, Crisis Plan and 5 Life Domains  Patient will attend 50% of groups offered on the unit  Patient will complete a goal card weekly  Outcome: Not Progressing  Goal: Complete daily ADLs, including personal hygiene independently, as able  Description  Interventions:  - Observe, teach, and assist patient with ADLS  - Monitor and promote a balance of rest/activity, with adequate nutrition and elimination   Outcome: Not Progressing     Problem: Depression  Goal: Refrain from self-neglect  Outcome: Not Memo Bonds was in the hallway sitting in the chair by the phone at the beginning of the shift  Pleasant and cooperative upon approach  Talkative with staff and select peers  Able to make needs known  Has not obsessed about her oxygen this shift  Denies pain  In bed most of the time  Sleeping on and off  Medications taken without an issue  Ate 75% of her meal and drank all of her Ensure  VSS  No shower this shift  Attended evening group  Took HS medication with prompting  Ate snack without difficulty  Oxygen saturation 92% prior to oxygen 1 liter via nasal cannula applied  Continue to monitor  Precautions maintained

## 2019-12-29 NOTE — PLAN OF CARE
Problem: Alteration in Thoughts and Perception  Goal: Treatment Goal: Gain control of psychotic behaviors/thinking, reduce/eliminate presenting symptoms and demonstrate improved reality functioning upon discharge  Outcome: Progressing     Problem: SAFETY ADULT  Goal: Patient will remain free of falls  Description  INTERVENTIONS:  - Assess patient frequently for physical needs  -  Identify cognitive and physical deficits and behaviors that affect risk of falls    -  [place] fall precautions as indicated by assessment   - Educate patient/family on patient safety including physical limitations  - Instruct patient to call for assistance with activity based on assessment  - Modify environment to reduce risk of injury  - Consider OT/PT consult to assist with strengthening/mobility  Outcome: Progressing   Received pt in bed quietly resting eyes closed chest noted to be rising , pt has ox on 1 l/min and no complaints offered, no behaviors, will monitor for change in behavior/assessment, with q 15 min safety checks made thru the night,

## 2019-12-29 NOTE — ASSESSMENT & PLAN NOTE
Being of pain in left hip requesting Carolyn Eller apply locally  Will order Carolyn Eller for her also on p r n   Tylenol

## 2019-12-30 NOTE — PLAN OF CARE
Problem: Individualized Interventions  Goal: Attend and participate in unit activities, including therapeutic, recreational, and educational groups  Description    PSYCHOTHERAPY INTERVENTIONS:    -Form therapeutic alliance to promote trust and safety within a therapeutic environment    -Promote self-awareness and identity development   -Identify and process patterns of behavior that have led to decompensation and/or hospitalizations   -Encourage reality-based thought content by examining thought processes and cognitive distortions      -Work with treatment team in reintegration back into the community when appropriate  Outcome: Progressing    Therapist prompted patient for walk off-unit at the mutually agreed upon time and date, 12/27 at 2pm   Walk was confirmed earlier in the day after IMR  When asked if she was ready, patient was in bed, facing the wall, and did not respond to therapist   This avoidant behavior is consistent with previous attempts to go off-unit  Therapist will continue to support patient in her recovery efforts, including community reintegration

## 2019-12-30 NOTE — PROGRESS NOTES
12/27/19 1100   Activity/Group Checklist   Group Other (Comment)  (IMR - Weekly Goal Review/Discharge Fears)   Attendance Attended   Attendance Duration (min) 46-60   Interactions Interacted appropriately   Affect/Mood Appropriate   Goals Achieved Identified feelings; Discussed discharge plans; Displayed empathy;Identified distorted thoughts/beliefs; Able to listen to others; Able to engage in interactions; Able to manage/cope with feelings; Able to self-disclose; Able to recieve feedback; Able to give feedback to another     Patient attended [facility] group focused on Weekly Goal Review and Discharge Fears  Patient did not bring in a goal card, but did report that in the past week she made an effort to attend groups, create and stick to a shower schedule, and participate in groups  She reports she is also working on getting a pass, however, she continues to refuse to go off unit with this therapist   Patient did need redirection at times when she became tangential about whether her group home will allow portable oxygen and a hospital bed  Although this was a discharge fear of patient, she was reminded that her need for these items is more due to anxiety than true medical need  Patient admits to being "psychosomatic" (her words)  Patient does very well when she attends groups, as she has insight to help others  She creates barriers to her own recovery by not applying recovery concepts to her own situation  Patient participated and was respectful of peers

## 2019-12-30 NOTE — PROGRESS NOTES
12/30/19 0900   Team Meeting   Meeting Type Daily Rounds   Team Members Present   Team Members Present Physician;Nurse;; Other (Discipline and Name)   Physician Team Member Dr Rosa Jimenez Team Member Jewels Snyder RN   Care Management Team Member Brenda So   Other (Discipline and Name) Cesar Bhatt LCSW     Patient did not have any behavioral issues over the weekend  Less demanding  Slept

## 2019-12-30 NOTE — PROGRESS NOTES
Progress Note - Selina Simon 1955, 60 y o  female MRN: [id number]    [facility] Encounter: [id number]    Primary Care Provider: Judith Morrissey PA-C   Date and time admitted to hospital: 7/23/2019  5:30 PM        * Schizoaffective disorder, bipolar type [facility])  Assessment & Plan  Psychiatry Progress Note  Patient is again focused on having portable oxygen if she has to go off the unit and apparently she was seen by respiratory therapist but no notes found in chart and we need to follow-up on that  The nurse assigned to her house written down that did not feel that she needed the portable oxygen which she contradicts  She continues to complain that she is not able to breathe and being dependent on oxygen when she was out has been consistently refusing to even go to the [place] next door to [facility] even with staff escort unless she is on a wheelchair and has portable oxygen  However she has not voiced any concerns about her having cancer or dying from terrible illness  and still questions her medications sometimes  She is happy that she can still go back to the [facility] rather than to the [facility] if she keeps up with her improvement  for a few more weeks in a row by continuing to attend groups more than 50% and attending to her ADLs like taking showers twice a week and her grooming has improved to some extent  She is still occasionally suspicious about certain staff tampering with her spirometer and oxygen concentrator off and on    She is also looking forward to going on a therapeutic pass with his sister for the new year day but reminded her that she needs to show she can go off unit and off grounds first with staff 1st therefore she was told that this will not happen because of her lack of cooperation    Current medications:    Current Facility-Administered Medications:     acetaminophen (TYLENOL) tablet 650 mg, 650 mg, Oral, Q6H PRN, Greer Beltran MD    albuterol (PROVENTIL HFA,VENTOLIN HFA) inhaler 2 puff, 2 puff, Inhalation, Q4H PRN, Deedee Muir MD, 2 puff at 10/11/19 0424    aluminum-magnesium hydroxide-simethicone (MYLANTA) 200-200-20 mg/5 mL oral suspension 15 mL, 15 mL, Oral, Q4H PRN, Deedee Muir MD    ammonium lactate (LAC-HYDRIN) 12 % lotion 1 application, 1 application, Topical, BID PRN, Deedee Muir MD    benzonatate (TESSALON PERLES) capsule 100 mg, 100 mg, Oral, TID PRN, Deedee Muir MD    benztropine (COGENTIN) injection 1 mg, 1 mg, Intramuscular, Q8H PRN, Deedee Muir MD    carbamide peroxide (DEBROX) 6 5 % otic solution 5 drop, 5 drop, Left Ear, BID, Rona Clinton MD, 5 drop at 12/29/19 1800    cloZAPine (CLOZARIL) tablet 25 mg, 25 mg, Oral, BID, Deedee Muir MD, 25 mg at 12/29/19 2119    cloZAPine (CLOZARIL) tablet 50 mg, 50 mg, Oral, BID, Deedee Muir MD, 50 mg at 12/29/19 2120    EPINEPHrine PF (ADRENALIN) 1 mg/mL injection 0 15 mg, 0 15 mg, Intramuscular, Once PRN, Deedee Muir MD    fluticasone-vilanterol (BREO ELLIPTA) 200-25 MCG/INH inhaler 1 puff, 1 puff, Inhalation, Daily, Deedee Muir MD, 1 puff at 12/30/19 0842    ketotifen (ZADITOR) 0 025 % ophthalmic solution 1 drop, 1 drop, Right Eye, BID PRN, Deedee Muir MD    levothyroxine tablet 125 mcg, 125 mcg, Oral, Early Morning, Deedee Muir MD, 125 mcg at 12/30/19 0610    magnesium hydroxide (MILK OF MAGNESIA) 400 mg/5 mL oral suspension 30 mL, 30 mL, Oral, Daily PRN, Deedee Muir MD    montelukast (SINGULAIR) tablet 10 mg, 10 mg, Oral, HS, Deedee Muir MD, 10 mg at 12/24/19 2109    OLANZapine (ZyPREXA) IM injection 5 mg, 5 mg, Intramuscular, Q8H PRN, Deedee Muir MD    OLANZapine (ZyPREXA) tablet 5 mg, 5 mg, Oral, Q8H PRN, Deedee Muir MD    ondansetron (ZOFRAN-ODT) dispersible tablet 4 mg, 4 mg, Oral, Q6H PRN, Deedee Muir MD, 4 mg at 12/09/19 1757    pantoprazole (PROTONIX) EC tablet 40 mg, 40 mg, Oral, Early Morning, Deedee Muir MD, 40 mg at 12/30/19 0610    polyethylene glycol (MIRALAX) packet 17 g, 17 g, Oral, Daily PRN, Zander Yanez MD    polyvinyl alcohol (LIQUIFILM TEARS) 1 4 % ophthalmic solution 1 drop, 1 drop, Both Eyes, Q3H PRN, Zander Yanez MD    sertraline (ZOLOFT) tablet 50 mg, 50 mg, Oral, BID, Zander Yanez MD, 50 mg at 12/30/19 1180    sucralfate (CARAFATE) oral suspension 1,000 mg, 1,000 mg, Oral, BID, Tomás Layne MD, 1,000 mg at 12/30/19 0610    theophylline (JEF-24) 24 hr capsule 200 mg, 200 mg, Oral, Daily, Zander Yanez MD, 200 mg at 12/30/19 0843    tiotropium [facility]) capsule for inhaler 18 mcg, 18 mcg, Inhalation, Daily, Zander Yanez MD, 18 mcg at 12/30/19 0843    traZODone (DESYREL) tablet 25 mg, 25 mg, Oral, HS PRN, Zander Yanez MD  Justification if on more than two antipsychotics:  Only on his clozapine  Side effects if any:  None    Risks , benefits, side effects and precautions of medications discussed with patient and reminded patient to let us know any problems with medications     Objective:     Vital Signs:  Vitals:    12/29/19 2000 12/29/19 2130 12/30/19 0700 12/30/19 0705   BP: 104/58  106/67 92/62   BP Location: Left arm  Left arm Left arm   Pulse: 88 87 79 74   Resp: 16  18 18   Temp: (!) 97 °F (36 1 °C)  98 3 °F (36 8 °C)    TempSrc: Temporal  Temporal    SpO2: 92% 91%     Weight:       Height:         Appearance:  age appropriate, casually dressed and overweight older than stated age, not well groomed, wearing clean clothes today but her hair looks uncombed   Behavior:  evasive and guarded with no psychosomatic complaints, friendly but argumentative about need for portable oxygen   Speech:  normal pitch and normal volume but circumstantial and tangential with stilted speech    Mood:  anxious and dysthymic   Affect:  mood-congruent, elated and entitled anxious and irritated and sad at times but pleasant today    Thought Process:  goal directed and illogical slightly pressured and tangential and talks as if in a court of law   Thought Content:  No current suicidal homicidal thoughts intent or plans reported  No phobias obsessions or compulsions reported  Still accusatory  to certain staff as if paranoid but less often  No overt delusions elicited today but still with some underlying psychosomatic complaints about not being able to breathe or swallow and preoccupied with need for portable oxygen when she leaves the unit   Perceptual Disturbances: None and does not appear responding to internal stimuli    Risk Potential: Tendency to not care for herself    Sensorium:  person, place, time/date, situation, day of week, month of year and time   Cognition:  grossly intact with no deficits in recent or remote memory or immediate recall   Consciousness:  alert and awake    Attention: Intact concentration and attention span   Intellect: Considered to be at least of average intelligence   Insight:  limited but improving   Judgment: improving      Motor Activity: no abnormal movements         Recent Labs:  Results Reviewed     None          I/O Past 24 hours:  No intake/output data recorded  No intake/output data recorded  Assessment / Plan:     [facility]ffective [facility]      Reason for continued inpatient care:  Because of underlying paranoia and inability to care for herself on her own and multiple somatic complaints  Acceptance by patient:  Accepting  Patsy Benavidez in recovery:  About living in same personal care home at the [facility] once she feels better  Understanding of medications :  Has some understanding  Involved in reintegration process:   On off unit privileges now  Trusting in relatoinship with psychiatrist:  Trusting    Recommended Treatment:    Medication changes:  1) none today  Non-pharmacological treatments  1) continue with  individual therapy group therapy, milieu therapy and occupational therapy and milieu therapy involving multidisciplinary team approach with psychotherapist, case management, nursing, peer support services, art therapist etc using recovery principles and psycho-education about accepting illness and need for treatment   2) encourage to cooperate with behavior plan  3) encourage to attend to ADLs like taking showers and wearing clean clothes   4) Encourage to attend groups   5) see how she does on off unit privileges and encourage to go off unit with staff escort  and expectations discussed with her and she did verbalize an understanding, consult respiratory to see if she needs portable oxygen  Safety  1) Safety/communication plan established targeting dynamic risk factors above  Discharge Plan most likely back to the a:  Personal care boarding home with act services once her delusions are managed and mood becomes more stabilized and she attends to her ADLs she becomes more receptive to treatment compliance but she has not shown any improvement in the last 5 months since she has been on the unit and hence the referral needs to be initiated for the  [address] [place] [address] / Coordination of Care    Total floor / unit time spent today 15 minutes  Greater than 50% of total time was spent with the patient and / or family counseling and / or coordination of care  A description of the counseling / coordination of care  Patient's Rights, confidentiality and exceptions to confidentiality, use of automated medical record, [address] [place] [address] staff access to medical record, and consent to treatment reviewed      Carissa Willis MD

## 2019-12-30 NOTE — ASSESSMENT & PLAN NOTE
Psychiatry Progress Note  Patient is again focused on having portable oxygen if she has to go off the unit and apparently she was seen by respiratory therapist but no notes found in chart and we need to follow-up on that  The nurse assigned to her house written down that did not feel that she needed the portable oxygen which she contradicts  She continues to complain that she is not able to breathe and being dependent on oxygen when she was out has been consistently refusing to even go to the [place] next door to [facility] even with staff escort unless she is on a wheelchair and has portable oxygen  However she has not voiced any concerns about her having cancer or dying from terrible illness  and still questions her medications sometimes  She is happy that she can still go back to the [facility] rather than to the [facility] if she keeps up with her improvement  for a few more weeks in a row by continuing to attend groups more than 50% and attending to her ADLs like taking showers twice a week and her grooming has improved to some extent  She is still occasionally suspicious about certain staff tampering with her spirometer and oxygen concentrator off and on    She is also looking forward to going on a therapeutic pass with his sister for the new year day but reminded her that she needs to show she can go off unit and off grounds first with staff 1st therefore she was told that this will not happen because of her lack of cooperation    Current medications:    Current Facility-Administered Medications:     acetaminophen (TYLENOL) tablet 650 mg, 650 mg, Oral, Q6H PRN, Krystyna Mcclain MD    albuterol (PROVENTIL HFA,VENTOLIN HFA) inhaler 2 puff, 2 puff, Inhalation, Q4H PRN, Krystyna Mcclain MD, 2 puff at 10/11/19 0424    aluminum-magnesium hydroxide-simethicone (MYLANTA) 200-200-20 mg/5 mL oral suspension 15 mL, 15 mL, Oral, Q4H PRN, Krystyna Mcclain MD    ammonium lactate (LAC-HYDRIN) 12 % lotion 1 application, 1 application, Topical, BID PRN, Dalton Garner MD    benzonatate (TESSALON PERLES) capsule 100 mg, 100 mg, Oral, TID PRN, Dalton Garner MD    benztropine (COGENTIN) injection 1 mg, 1 mg, Intramuscular, Q8H PRN, Dalton Garner MD    carbamide peroxide (DEBROX) 6 5 % otic solution 5 drop, 5 drop, Left Ear, BID, Rona Correia MD, 5 drop at 12/29/19 1800    cloZAPine (CLOZARIL) tablet 25 mg, 25 mg, Oral, BID, Dalton Garner MD, 25 mg at 12/29/19 2119    cloZAPine (CLOZARIL) tablet 50 mg, 50 mg, Oral, BID, Dalton Garner MD, 50 mg at 12/29/19 2120    EPINEPHrine PF (ADRENALIN) 1 mg/mL injection 0 15 mg, 0 15 mg, Intramuscular, Once PRN, Dalton Garner MD    fluticasone-vilanterol (BREO ELLIPTA) 200-25 MCG/INH inhaler 1 puff, 1 puff, Inhalation, Daily, Dalton Garner MD, 1 puff at 12/30/19 0842    ketotifen (ZADITOR) 0 025 % ophthalmic solution 1 drop, 1 drop, Right Eye, BID PRN, Dalton Garner MD    levothyroxine tablet 125 mcg, 125 mcg, Oral, Early Morning, Dalton Garner MD, 125 mcg at 12/30/19 0610    magnesium hydroxide (MILK OF MAGNESIA) 400 mg/5 mL oral suspension 30 mL, 30 mL, Oral, Daily PRN, Dalton Garner MD    montelukast (SINGULAIR) tablet 10 mg, 10 mg, Oral, HS, Dalton Garner MD, 10 mg at 12/24/19 2109    OLANZapine (ZyPREXA) IM injection 5 mg, 5 mg, Intramuscular, Q8H PRN, Dalton Garner MD    OLANZapine (ZyPREXA) tablet 5 mg, 5 mg, Oral, Q8H PRN, Dalton Garner MD    ondansetron (ZOFRAN-ODT) dispersible tablet 4 mg, 4 mg, Oral, Q6H PRN, Dalton Garner MD, 4 mg at 12/09/19 1757    pantoprazole (PROTONIX) EC tablet 40 mg, 40 mg, Oral, Early Morning, Dalton Garner MD, 40 mg at 12/30/19 0610    polyethylene glycol (MIRALAX) packet 17 g, 17 g, Oral, Daily PRN, Dalton Garner MD    polyvinyl alcohol (LIQUIFILM TEARS) 1 4 % ophthalmic solution 1 drop, 1 drop, Both Eyes, Q3H PRN, Dalton Ganrer MD    sertraline (ZOLOFT) tablet 50 mg, 50 mg, Oral, BID, Dalton Garner MD, 50 mg at 12/30/19 0843    sucralfate (CARAFATE) oral suspension 1,000 mg, 1,000 mg, Oral, BID, Cat Torres MD, 1,000 mg at 12/30/19 0610    theophylline (JEF-24) 24 hr capsule 200 mg, 200 mg, Oral, Daily, Shilpa Trujillo MD, 200 mg at 12/30/19 0843    tiotropium [facility]) capsule for inhaler 18 mcg, 18 mcg, Inhalation, Daily, Shilpa Trujillo MD, 18 mcg at 12/30/19 0843    traZODone (DESYREL) tablet 25 mg, 25 mg, Oral, HS PRN, Shilpa Trujillo MD  Justification if on more than two antipsychotics:  Only on his clozapine  Side effects if any:  None    Risks , benefits, side effects and precautions of medications discussed with patient and reminded patient to let us know any problems with medications     Objective:     Vital Signs:  Vitals:    12/29/19 2000 12/29/19 2130 12/30/19 0700 12/30/19 0705   BP: 104/58  106/67 92/62   BP Location: Left arm  Left arm Left arm   Pulse: 88 87 79 74   Resp: 16  18 18   Temp: (!) 97 °F (36 1 °C)  98 3 °F (36 8 °C)    TempSrc: Temporal  Temporal    SpO2: 92% 91%     Weight:       Height:         Appearance:  age appropriate, casually dressed and overweight older than stated age, not well groomed, wearing clean clothes today but her hair looks uncombed   Behavior:  evasive and guarded with no psychosomatic complaints, friendly but argumentative about need for portable oxygen   Speech:  normal pitch and normal volume but circumstantial and tangential with stilted speech    Mood:  anxious and dysthymic   Affect:  mood-congruent, elated and entitled anxious and irritated and sad at times but pleasant today    Thought Process:  goal directed and illogical slightly pressured and tangential and talks as if in a court of law   Thought Content:  No current suicidal homicidal thoughts intent or plans reported  No phobias obsessions or compulsions reported  Still accusatory  to certain staff as if paranoid but less often    No overt delusions elicited today but still with some underlying psychosomatic complaints about not being able to breathe or swallow and preoccupied with need for portable oxygen when she leaves the unit   Perceptual Disturbances: None and does not appear responding to internal stimuli    Risk Potential: Tendency to not care for herself    Sensorium:  person, place, time/date, situation, day of week, month of year and time   Cognition:  grossly intact with no deficits in recent or remote memory or immediate recall   Consciousness:  alert and awake    Attention: Intact concentration and attention span   Intellect: Considered to be at least of average intelligence   Insight:  limited but improving   Judgment: improving      Motor Activity: no abnormal movements         Recent Labs:  Results Reviewed     None          I/O Past 24 hours:  No intake/output data recorded  No intake/output data recorded  Assessment / Plan:     Schizoaffective disorder, bipolar type ([facility] )      Reason for continued inpatient care:  Because of underlying paranoia and inability to care for herself on her own and multiple somatic complaints  Acceptance by patient:  Accepting  Alissa De Anda in recovery:  About living in same personal care home at the [place] once she feels better  Understanding of medications :  Has some understanding  Involved in reintegration process: On off unit privileges now  Trusting in relatoinship with psychiatrist:  Trusting    Recommended Treatment:    Medication changes:  1) none today  Non-pharmacological treatments  1) continue with  individual therapy group therapy, milieu therapy and occupational therapy and milieu therapy involving multidisciplinary team approach with psychotherapist, case management, nursing, peer support services, art therapist etc using recovery principles and psycho-education about accepting illness and need for treatment     2) encourage to cooperate with behavior plan  3) encourage to attend to ADLs like taking showers and wearing clean clothes   4) Encourage to attend groups   5) see how she does on off unit privileges and encourage to go off unit with staff escort  and expectations discussed with her and she did verbalize an understanding, consult respiratory to see if she needs portable oxygen  Safety  1) Safety/communication plan established targeting dynamic risk factors above  Discharge Plan most likely back to the a:  Personal care boarding home with act services once her delusions are managed and mood becomes more stabilized and she attends to her ADLs she becomes more receptive to treatment compliance but she has not shown any improvement in the last 5 months since she has been on the unit and hence the referral needs to be initiated for the  [address] [place] [address] / Coordination of Care    Total floor / unit time spent today 15 minutes  Greater than 50% of total time was spent with the patient and / or family counseling and / or coordination of care  A description of the counseling / coordination of care  Patient's Rights, confidentiality and exceptions to confidentiality, use of automated medical record, [facility] to [facility] record, and consent to treatment reviewed      Sandhya Bolaños MD

## 2019-12-30 NOTE — PLAN OF CARE
Problem: Alteration in Thoughts and Perception  Goal: Treatment Goal: Gain control of psychotic behaviors/thinking, reduce/eliminate presenting symptoms and demonstrate improved reality functioning upon discharge  Outcome: Progressing  Goal: Verbalize thoughts and feelings  Description  Interventions:  - Promote a nonjudgmental and trusting relationship with the patient through active listening and therapeutic communication  - Assess patient's level of functioning, behavior and potential for risk  - Engage patient in 1 on 1 interactions for a minimum of 15 minutes each session  - Encourage patient to express fears, feelings, frustrations, and discuss symptoms    - [place] patient to reality, help patient recognize reality-based thinking   - Administer medications as ordered and assess for potential side effects  - Provide the patient education related to the signs and symptoms of the illness and desired effects of prescribed medications  Outcome: Progressing  Goal: Agree to be compliant with medication regime, as prescribed and report medication side effects  Description  Interventions:  - Offer appropriate PRN medication and supervise ingestion; conduct aims, as needed   Outcome: Progressing  Goal: Attend and participate in unit activities, including therapeutic, recreational, and educational groups  Description  Interventions:  - Provide therapeutic and educational activities daily, encourage attendance and participation, and document same in the medical record     CERTIFIED PEER SPECIALIST INTERVENTIONS:    Complete peer assessment with patient to assess their needs and identify their goals to complete while in the recovery program as well as once discharged into the community  Patient will complete WRAP Plan, Crisis Plan and [facility] [employer]  Patient will attend 50% of groups offered on the unit  Patient will complete a goal card weekly      Outcome: Progressing     Problem: Ineffective Coping  Goal: Participates in unit activities  Description  Interventions:  - Provide therapeutic environment   - Provide required programming   - Redirect inappropriate behaviors   Outcome: Progressing  Goal: Patient/Family verbalizes awareness of resources  Outcome: Progressing  Goal: Understands least restrictive measures  Description  Interventions:  - Utilize least restrictive behavior  Outcome: Progressing     Problem: Risk for Self Injury/Neglect  Goal: Treatment Goal: Remain safe during length of stay, learn and adopt new coping skills, and be free of self-injurious ideation, impulses and acts at the time of discharge  Outcome: Progressing  Goal: Verbalize thoughts and feelings  Description  Interventions:  - Assess and re-assess patient's lethality and potential for self-injury  - Engage patient in 1:1 interactions, daily, for a minimum of 15 minutes  - Encourage patient to express feelings, fears, frustrations, hopes  - Establish rapport/trust with patient   Outcome: Progressing  Goal: Refrain from harming self  Description  Interventions:  - Monitor patient closely, per order  - Develop a trusting relationship  - Supervise medication ingestion, monitor effects and side effects   Outcome: Progressing  Goal: Attend and participate in unit activities, including therapeutic, recreational, and educational groups  Description  Interventions:  - Provide therapeutic and educational activities daily, encourage attendance and participation, and document same in the medical record  - Obtain collateral information, encourage visitation and family involvement in care   Outcome: Progressing     Problem: Depression  Goal: Treatment Goal: Demonstrate behavioral control of depressive symptoms, verbalize feelings of improved mood/affect, and adopt new coping skills prior to discharge  Outcome: Progressing  Goal: Verbalize thoughts and feelings  Description  Interventions:  - Assess and re-assess patient's level of risk   - Engage patient in 1:1 interactions, daily, for a minimum of 15 minutes   - Encourage patient to express feelings, fears, frustrations, hopes   Outcome: Progressing  Goal: Refrain from isolation  Description  Interventions:  - Develop a trusting relationship   - Encourage socialization   Outcome: Progressing     Problem: Anxiety  Goal: Anxiety is at manageable level  Description  Interventions:  - Assess and monitor patient's anxiety level  - Monitor for signs and symptoms of anxiety both physical and emotional (heart palpitations, chest pain, shortness of breath, headaches, nausea, feeling jumpy, restlessness, irritable, apprehensive)  - Collaborate with interdisciplinary team and initiate plan and interventions as ordered    - [place] patient to unit/surroundings  - Explain treatment plan  - Encourage participation in care  - Encourage verbalization of concerns/fears  - Identify coping mechanisms  - Assist in developing anxiety-reducing skills  - Administer/offer alternative therapies  - Limit or eliminate stimulants  Outcome: Progressing     Problem: Alteration in Orientation  Goal: Interact with staff daily  Description  Interventions:  - Assess and re-assess patient's level of orientation  - Engage patient in 1 on 1 interactions, daily, for a minimum of 15 minutes   - Establish rapport/trust with patient   Outcome: Progressing     Problem: PAIN - ADULT  Goal: Verbalizes/displays adequate comfort level or baseline comfort level  Description  Interventions:  - Encourage patient to monitor pain and request assistance  - Assess pain using appropriate pain scale  - Administer analgesics based on type and severity of pain and evaluate response  - Implement non-pharmacological measures as appropriate and evaluate response  - Consider cultural and social influences on pain and pain management  - Notify physician/advanced practitioner if interventions unsuccessful or patient reports new pain  Outcome: Progressing     Problem: SAFETY ADULT  Goal: Patient will remain free of falls  Description  INTERVENTIONS:  - Assess patient frequently for physical needs  -  Identify cognitive and physical deficits and behaviors that affect risk of falls  -  [place] fall precautions as indicated by assessment   - Educate patient/family on patient safety including physical limitations  - Instruct patient to call for assistance with activity based on assessment  - Modify environment to reduce risk of injury  - Consider OT/PT consult to assist with strengthening/mobility  Outcome: Progressing     Problem: Alteration in Thoughts and Perception  Goal: Complete daily ADLs, including personal hygiene independently, as able  Description  Interventions:  - Observe, teach, and assist patient with ADLS  - Monitor and promote a balance of rest/activity, with adequate nutrition and elimination   Outcome: Not Progressing     Problem: Risk for Self Injury/Neglect  Goal: Complete daily ADLs, including personal hygiene independently, as able  Description  Interventions:  - Observe, teach, and assist patient with ADLS  - Monitor and promote a balance of rest/activity, with adequate nutrition and elimination  Outcome: Not Progressing     Problem: Depression  Goal: Refrain from self-neglect  Outcome: Not Progressing     Problem: Nutrition/Hydration-ADULT  Goal: Nutrient/Hydration intake appropriate for improving, restoring or maintaining nutritional needs  Description  Monitor and assess patient's nutrition/hydration status for malnutrition  Collaborate with interdisciplinary team and initiate plan and interventions as ordered  Monitor patient's weight and dietary intake as ordered or per policy  Utilize nutrition screening tool and intervene as necessary  Determine patient's food preferences and provide high-protein, high-caloric foods as appropriate       INTERVENTIONS:  - Monitor oral intake, urinary output, labs, and treatment plans  - Assess nutrition and hydration status and recommend course of action  - Evaluate amount of meals eaten  - Assist patient with eating if necessary   - Allow adequate time for meals  - Recommend/ encourage appropriate diets, oral nutritional supplements, and vitamin/mineral supplements  - Order, calculate, and assess calorie counts as needed  - Recommend, monitor, and adjust tube feedings and TPN/PPN based on assessed needs  - Assess need for intravenous fluids  - Provide specific nutrition/hydration education as appropriate  - Include patient/family/caregiver in decisions related to nutrition  Outcome: Not Progressing   Mostly isolative to her room, napping, but did come out for meds, meals, and attended IMR group  Disheveled in appearance and did not get her bin to shower or do hygiene  Ate 75% of breakfast consisting of oatmeal but did not eat the yogurt  Did not eat lunch which consisted of a cheeseburger and fries, complained it was cold as the reason she did not eat  No somatic complaints voiced  Refused Debrox drops  No other behaviors or issues noted  Continue to monitor

## 2019-12-30 NOTE — PROGRESS NOTES
12/30/19 1100   Activity/Group Checklist   Group   (IMR/Relapse Prevention )   Attendance Attended   Attendance Duration (min) 46-60   Interactions Interacted appropriately   Affect/Mood Appropriate;Normal range   Goals Achieved Identified feelings; Able to listen to others; Able to engage in interactions; Able to self-disclose

## 2019-12-31 NOTE — PROGRESS NOTES
12/31/19 0923   Team Meeting   Meeting Type Tx Team Meeting   Initial Conference Date 12/31/19   Next Conference Date 01/07/20   Team Members Present   Team Members Present Physician;Nurse;; Other (Discipline and Name)   Physician Team Member Dr Joni Tavarez Team Member Laure Avilez, LISA   Care Management Team Member Brenda Donis   Other (Discipline and Name) Janee Yanes LCSW; [facility]   Patient/Family Present   Patient Present Yes   Patient's Family Present No     Patient attended treatment team meeting this morning without her self assessment completed  Patient attended 52% of groups offered last week  Patient reported she was not able to shower last night but plans to shower tonight  She also reported she had a panic attack last evening  She is also preoccupied about following up with the [address] [place] [address] staff and getting results from them  Team and patient completed risk assessment and the patient did not verbalize any desire to elope from the program  Patient verbalized understanding of consequences of eloping from treatment while on a commitment  Patient verbalized no further questions or concerns at the conclusion of the meeting

## 2019-12-31 NOTE — PROGRESS NOTES
12/31/19 0900   Team Meeting   Meeting Type Daily Rounds   Team Members Present   Team Members Present Physician;Nurse;; Other (Discipline and Name)   Physician Team Member Dr Ricardo Rivera Team Member Delphine Whiting RN   Care Management Team Member Brenda Sullivan   Other (Discipline and Name) Twan Richards LCSW     Patient reported to staff that she wanted to shower yesterday however, stated she ate too much and was too full so had to put the shower off a day  Also reported to staff that she had a panic attack in group yesterday evening  Reported she felt like she was floating out of the universe and was clutching MHT's arm hyperventilating  Slept

## 2019-12-31 NOTE — PROGRESS NOTES
12/31/19 1500   Activity/Group Checklist   Group   (Revovery Workshop/A Year in Review)   Attendance Attended   Attendance Duration (min) 46-60   Interactions Interacted appropriately   Affect/Mood Appropriate;Bright;Normal range   Goals Achieved Identified feelings; Able to listen to others; Able to engage in interactions; Able to self-disclose

## 2019-12-31 NOTE — NURSING NOTE
Pt in room this am   Out for breakfast and lunch  Appetite is inconsistent, but overall adequate  Pt very upset about O2 not being ordered for passes  Requesting all doctors to write prescriptions and "paperwork" about why she can't have oxygen  Repetitive with questions regarding study that was done, stating "I'm a sick person  I have an illness "  Attended group activities

## 2019-12-31 NOTE — PROGRESS NOTES
12/31/19 1100   Activity/Group Checklist   Group   (IMR/Identifying Warning Signs of Relapse )   Attendance Attended   Attendance Duration (min) 46-60   Interactions Interacted appropriately   Affect/Mood Appropriate;Normal range;Bright   Goals Achieved Able to self-disclose; Able to engage in interactions; Able to listen to others; Identified feelings; Identified triggers

## 2019-12-31 NOTE — PROGRESS NOTES
Progress Note - Roselia Woody 1955, 60 y o  female MRN: [id number]    Unit/Bed#: [facility]ARCIO ADA[facility] 110-02 Encounter: [id number]    Primary Care Provider: Deeanna Apley, PA-C   Date and time admitted to hospital: 7/23/2019  5:30 PM        * Schizoaffective disorder, bipolar type [facility])  Assessment & Plan  Psychiatry Progress Note  Patient continues to complain that she is not able to breathe and being dependent on oxygen when she goes out  And has been consistently refusing to even go to the [place] next door to [facility] even with staff escort unless she is on a wheelchair and has portable oxygen  She was checked by [facility] [employer] but they have not written a note about the portable oxygen yet  She claims to have had an anxiety attack yesterday in evening group and was hyperventilating and had to be escorted back to her room by staff  However she has not voiced any concerns about her having cancer or dying from terrible illness  and still questions her medications sometimes  She is happy that she can still go back to the [facility] care [place] rather than to the [facility] if she keeps up with her improvement  for a few more weeks in a row by continuing to attend groups more than 50% and attending to her ADLs like taking showers twice a week but today she tells me she has not taken showers in a week and plans to take one later today as opposed to taking twice a week  She is still occasionally suspicious about certain staff tampering with her spirometer and oxygen concentrator off and on and again focussed on having a portable oxygen to be able togo out   She was again reminded  that she needs to show she can go off unit and off grounds first with staff before she can go off grounds with her sister    As per nursing today those respiratory does not believe that she needs portable oxygen and she goes off unit   Current medications:    Current Facility-Administered Medications:     acetaminophen (TYLENOL) tablet 650 mg, 650 mg, Oral, Q6H PRN, Isidoro Gonzalez MD    albuterol (PROVENTIL HFA,VENTOLIN HFA) inhaler 2 puff, 2 puff, Inhalation, Q4H PRN, Isidoro Gonzalez MD, 2 puff at 10/11/19 0424    aluminum-magnesium hydroxide-simethicone (MYLANTA) 200-200-20 mg/5 mL oral suspension 15 mL, 15 mL, Oral, Q4H PRN, Isidoro Gonzalez MD    ammonium lactate (LAC-HYDRIN) 12 % lotion 1 application, 1 application, Topical, BID PRN, Isidoro Gonzalez MD    benzonatate (TESSALON PERLES) capsule 100 mg, 100 mg, Oral, TID PRN, Isidoro Gonzalez MD    benztropine (COGENTIN) injection 1 mg, 1 mg, Intramuscular, Q8H PRN, Isidoro Gonzalez MD    carbamide peroxide (DEBROX) 6 5 % otic solution 5 drop, 5 drop, Left Ear, BID, Rona Roman MD, 5 drop at 12/29/19 1800    cloZAPine (CLOZARIL) tablet 25 mg, 25 mg, Oral, BID, Isidoro Gonzalez MD, 25 mg at 12/30/19 2147    cloZAPine (CLOZARIL) tablet 50 mg, 50 mg, Oral, BID, Isidoro Gonzalez MD, 50 mg at 12/30/19 2147    EPINEPHrine PF (ADRENALIN) 1 mg/mL injection 0 15 mg, 0 15 mg, Intramuscular, Once PRN, Isidoro Gonzalez MD    fluticasone-vilanterol (BREO ELLIPTA) 200-25 MCG/INH inhaler 1 puff, 1 puff, Inhalation, Daily, Isidroo Gonzalez MD, 1 puff at 12/31/19 0844    ketotifen (ZADITOR) 0 025 % ophthalmic solution 1 drop, 1 drop, Right Eye, BID PRN, Isidoro Gonzalez MD    levothyroxine tablet 125 mcg, 125 mcg, Oral, Early Morning, Isidoro Gonzalez MD, 125 mcg at 12/31/19 0559    magnesium hydroxide (MILK OF MAGNESIA) 400 mg/5 mL oral suspension 30 mL, 30 mL, Oral, Daily PRN, Isidoro Gonzalez MD    montelukast (SINGULAIR) tablet 10 mg, 10 mg, Oral, HS, Isidoro Gonzalez MD, 10 mg at 12/24/19 2109    OLANZapine (ZyPREXA) IM injection 5 mg, 5 mg, Intramuscular, Q8H PRN, Isidoro Gonzalez MD    OLANZapine (ZyPREXA) tablet 5 mg, 5 mg, Oral, Q8H PRN, Isidoro Gonzalez MD    ondansetron (ZOFRAN-ODT) dispersible tablet 4 mg, 4 mg, Oral, Q6H PRN, Isidoro Gonzalez MD, 4 mg at 12/09/19 1757    pantoprazole (PROTONIX) EC tablet 40 mg, 40 mg, Oral, Early Morning, Radu Bennett Merry Rojas MD, 40 mg at 12/31/19 0559    polyethylene glycol (MIRALAX) packet 17 g, 17 g, Oral, Daily PRN, Jeffrey Gallegos MD    polyvinyl alcohol (LIQUIFILM TEARS) 1 4 % ophthalmic solution 1 drop, 1 drop, Both Eyes, Q3H PRN, Jeffrey Gallegos MD    sertraline (ZOLOFT) tablet 50 mg, 50 mg, Oral, BID, Jeffrey Gallegos MD, 50 mg at 12/31/19 8367    sucralfate (CARAFATE) oral suspension 1,000 mg, 1,000 mg, Oral, BID, Cat Torres MD, 1,000 mg at 12/31/19 0600    theophylline (JEF-24) 24 hr capsule 200 mg, 200 mg, Oral, Daily, Jeffrey Gallegos MD, 200 mg at 12/31/19 0843    tiotropium (SPIRIVA) capsule for inhaler 18 mcg, 18 mcg, Inhalation, Daily, Jeffrey Gallegos MD, 18 mcg at 12/31/19 0844    traZODone (DESYREL) tablet 25 mg, 25 mg, Oral, HS PRN, Jeffrey Gallegos MD  Justification if on more than two antipsychotics:  Only on his clozapine  Side effects if any:  None    Risks , benefits, side effects and precautions of medications discussed with patient and reminded patient to let us know any problems with medications     Objective:     Vital Signs:  Vitals:    12/30/19 1900 12/31/19 0643 12/31/19 0730 12/31/19 0732   BP: 90/58  115/73 100/63   BP Location: Left arm  Left arm Left arm   Pulse: 93  88 71   Resp: 15  18    Temp: (!) 97 °F (36 1 °C)  97 9 °F (36 6 °C)    TempSrc: Temporal  Temporal    SpO2: 90% 95%     Weight:       Height:         Appearance:  age appropriate, casually dressed and overweight older than stated age, appears disheveled with uncombed hair   Behavior:  evasive and guarded with no psychosomatic complaints, friendly but argumentative    Speech:  normal pitch and normal volume but circumstantial and tangential with stilted speech    Mood:  anxious and dysthymic   Affect:  mood-congruent, elated and entitled anxious and irritated and sad at times but pleasant today    Thought Process:  goal directed and illogical slightly pressured and tangential and talks as if in a court of law   Thought Content:  No current suicidal homicidal thoughts intent or plans reported  No phobias obsessions or compulsions reported  Still accusatory  to certain staff off and on  No overt delusions elicited today but still with some underlying psychosomatic complaints about not being able to breathe or swallow and preoccupied with need for portable oxygen    Perceptual Disturbances: None and does not appear responding to internal stimuli    Risk Potential: Tendency to not care for herself    Sensorium:  person, place, time/date, situation, day of week, month of year and time   Cognition:  grossly intact with no deficits in recent or remote memory or immediate recall   Consciousness:  alert and awake    Attention: Intact concentration and attention span   Intellect: Considered to be at least of average intelligence   Insight:  limited but improving   Judgment: improving      Motor Activity: no abnormal movements         Recent Labs:  Results Reviewed     None          I/O Past 24 hours:  No intake/output data recorded  No intake/output data recorded  Assessment / Plan:     [facility]ffective [facility]      Reason for continued inpatient care:  Because of underlying paranoia and inability to care for herself on her own and multiple somatic complaints  Acceptance by patient:  Accepting  [place] in recovery:  About living in same personal care home at the [place] once she feels better  Understanding of medications :  Has some understanding  Involved in reintegration process:   On off unit privileges now  Trusting in relatoinship with psychiatrist:  Trusting    Recommended Treatment:    Medication changes:  1) none today  Non-pharmacological treatments  1) continue with  individual therapy group therapy, milieu therapy and occupational therapy and milieu therapy involving multidisciplinary team approach with psychotherapist, case management, nursing, peer support services, art therapist etc using recovery principles and psycho-education about accepting illness and need for treatment   2) encourage to cooperate with behavior plan  3) encourage to attend to ADLs like taking showers and wearing clean clothes   4) Encourage to attend groups   5) see how she does on off unit privileges and encourage to go off unit with staff escort  and expectations discussed with her and she did verbalize an understanding, consult respiratory to see if she needs portable oxygen and their findings are still pending  Safety  1) Safety/communication plan established targeting dynamic risk factors above  Discharge Plan most likely back to the a:  Personal care boarding home with act services once her delusions are managed and mood becomes more stabilized and she attends to her ADLs she becomes more receptive to treatment compliance but she has not shown any improvement in the last 5 months since she has been on the unit and hence the referral needs to be initiated for the  [address] [place] [address] / Coordination of Care    Total floor / unit time spent today 15 minutes  Greater than 50% of total time was spent with the patient and / or family counseling and / or coordination of care  A description of the counseling / coordination of care  Patient's Rights, confidentiality and exceptions to confidentiality, use of automated medical record, [address] [place] [address] staff access to medical record, and consent to treatment reviewed      Ivonne Nevarez MD

## 2019-12-31 NOTE — PLAN OF CARE
Problem: Alteration in Thoughts and Perception  Goal: Treatment Goal: Gain control of psychotic behaviors/thinking, reduce/eliminate presenting symptoms and demonstrate improved reality functioning upon discharge  Outcome: Progressing  Goal: Verbalize thoughts and feelings  Description  Interventions:  - Promote a nonjudgmental and trusting relationship with the patient through active listening and therapeutic communication  - Assess patient's level of functioning, behavior and potential for risk  - Engage patient in 1 on 1 interactions for a minimum of 15 minutes each session  - Encourage patient to express fears, feelings, frustrations, and discuss symptoms    - [place] patient to reality, help patient recognize reality-based thinking   - Administer medications as ordered and assess for potential side effects  - Provide the patient education related to the signs and symptoms of the illness and desired effects of prescribed medications  Outcome: Progressing  Goal: Agree to be compliant with medication regime, as prescribed and report medication side effects  Description  Interventions:  - Offer appropriate PRN medication and supervise ingestion; conduct aims, as needed   Outcome: Progressing  Goal: Attend and participate in unit activities, including therapeutic, recreational, and educational groups  Description  Interventions:  - Provide therapeutic and educational activities daily, encourage attendance and participation, and document same in the medical record     CERTIFIED PEER SPECIALIST INTERVENTIONS:    Complete peer assessment with patient to assess their needs and identify their goals to complete while in the recovery program as well as once discharged into the community  Patient will complete WRAP Plan, Crisis Plan and [facility] [employer]  Patient will attend 50% of groups offered on the unit  Patient will complete a goal card weekly      Outcome: Progressing  Goal: Recognize dysfunctional thoughts, communicate reality-based thoughts at the time of discharge  Description  Interventions:  - Provide medication and psycho-education to assist patient in compliance and developing insight into his/her illness   Outcome: Progressing  Goal: Complete daily ADLs, including personal hygiene independently, as able  Description  Interventions:  - Observe, teach, and assist patient with ADLS  - Monitor and promote a balance of rest/activity, with adequate nutrition and elimination   Outcome: Progressing     Problem: Ineffective Coping  Goal: Identifies ineffective coping skills  Outcome: Progressing  Goal: Identifies healthy coping skills  Outcome: Progressing  Goal: Demonstrates healthy coping skills  Outcome: Progressing  Goal: Participates in unit activities  Description  Interventions:  - Provide therapeutic environment   - Provide required programming   - Redirect inappropriate behaviors   Outcome: Progressing  Goal: Patient/Family participate in treatment and DC plans  Description  Interventions:  - Provide therapeutic environment  Outcome: Progressing  Goal: Patient/Family verbalizes awareness of resources  Outcome: Progressing  Goal: Understands least restrictive measures  Description  Interventions:  - Utilize least restrictive behavior  Outcome: Progressing     Problem: Risk for Self Injury/Neglect  Goal: Treatment Goal: Remain safe during length of stay, learn and adopt new coping skills, and be free of self-injurious ideation, impulses and acts at the time of discharge  Outcome: Progressing  Goal: Verbalize thoughts and feelings  Description  Interventions:  - Assess and re-assess patient's lethality and potential for self-injury  - Engage patient in 1:1 interactions, daily, for a minimum of 15 minutes  - Encourage patient to express feelings, fears, frustrations, hopes  - Establish rapport/trust with patient   Outcome: Progressing  Goal: Refrain from harming self  Description  Interventions:  - Monitor patient closely, per order  - Develop a trusting relationship  - Supervise medication ingestion, monitor effects and side effects   Outcome: Progressing  Goal: Attend and participate in unit activities, including therapeutic, recreational, and educational groups  Description  Interventions:  - Provide therapeutic and educational activities daily, encourage attendance and participation, and document same in the medical record  - Obtain collateral information, encourage visitation and family involvement in care   Outcome: Progressing  Goal: Recognize maladaptive responses and adopt new coping mechanisms  Outcome: Progressing  Goal: Complete daily ADLs, including personal hygiene independently, as able  Description  Interventions:  - Observe, teach, and assist patient with ADLS  - Monitor and promote a balance of rest/activity, with adequate nutrition and elimination  Outcome: Progressing     Problem: Depression  Goal: Treatment Goal: Demonstrate behavioral control of depressive symptoms, verbalize feelings of improved mood/affect, and adopt new coping skills prior to discharge  Outcome: Progressing  Goal: Verbalize thoughts and feelings  Description  Interventions:  - Assess and re-assess patient's level of risk   - Engage patient in 1:1 interactions, daily, for a minimum of 15 minutes   - Encourage patient to express feelings, fears, frustrations, hopes   Outcome: Progressing  Goal: Refrain from isolation  Description  Interventions:  - Develop a trusting relationship   - Encourage socialization   Outcome: Progressing  Goal: Refrain from self-neglect  Outcome: Progressing     Problem: Anxiety  Goal: Anxiety is at manageable level  Description  Interventions:  - Assess and monitor patient's anxiety level  - Monitor for signs and symptoms of anxiety both physical and emotional (heart palpitations, chest pain, shortness of breath, headaches, nausea, feeling jumpy, restlessness, irritable, apprehensive)     - Collaborate with interdisciplinary team and initiate plan and interventions as ordered  - [place] patient to unit/surroundings  - Explain treatment plan  - Encourage participation in care  - Encourage verbalization of concerns/fears  - Identify coping mechanisms  - Assist in developing anxiety-reducing skills  - Administer/offer alternative therapies  - Limit or eliminate stimulants  Outcome: Progressing     Problem: Alteration in Orientation  Goal: Interact with staff daily  Description  Interventions:  - Assess and re-assess patient's level of orientation  - Engage patient in 1 on 1 interactions, daily, for a minimum of 15 minutes   - Establish rapport/trust with patient   Outcome: Progressing  Goal: Cooperate with recommended testing/procedures  Description  Interventions:  - Determine need for ancillary testing  - Observe for mental status changes  - Implement falls/precaution protocol   Outcome: Progressing     Problem: PAIN - ADULT  Goal: Verbalizes/displays adequate comfort level or baseline comfort level  Description  Interventions:  - Encourage patient to monitor pain and request assistance  - Assess pain using appropriate pain scale  - Administer analgesics based on type and severity of pain and evaluate response  - Implement non-pharmacological measures as appropriate and evaluate response  - Consider cultural and social influences on pain and pain management  - Notify physician/advanced practitioner if interventions unsuccessful or patient reports new pain  Outcome: Progressing     Problem: SAFETY ADULT  Goal: Patient will remain free of falls  Description  INTERVENTIONS:  - Assess patient frequently for physical needs  -  Identify cognitive and physical deficits and behaviors that affect risk of falls    -  [place] fall precautions as indicated by assessment   - Educate patient/family on patient safety including physical limitations  - Instruct patient to call for assistance with activity based on assessment  - Modify environment to reduce risk of injury  - Consider OT/PT consult to assist with strengthening/mobility  Outcome: Progressing     Problem: RESPIRATORY - ADULT  Goal: Achieves optimal ventilation and oxygenation  Description  INTERVENTIONS:  - Assess for changes in respiratory status  - Assess for changes in mentation and behavior  - Position to facilitate oxygenation and minimize respiratory effort  - Oxygen administration by appropriate delivery method based on oxygen saturation (per order) or ABGs  - Initiate smoking cessation education as indicated  - Encourage broncho-pulmonary hygiene including cough, deep breathe, Incentive Spirometry  - Assess the need for suctioning and aspirate as needed  - Assess and instruct to report SOB or any respiratory difficulty  - Respiratory Therapy support as indicated  Outcome: Progressing     Problem: DISCHARGE PLANNING  Goal: Discharge to home or other facility with appropriate resources  Description  INTERVENTIONS:  - Conduct assessment to determine patient/family and health care team treatment goals, and need for post-acute services based on payer coverage, community resources, and patient preferences, and barriers to discharge  - Address psychosocial, clinical, and financial barriers to discharge as identified in assessment in conjunction with the patient/family and health care team  - Assist the patient in reintegration back into the community by removing barriers which may hinder a successful discharge once deemed stable  - Arrange appropriate level of post-acute services according to patient's needs and preference and payer coverage in collaboration with the physician and health care team  - Communicate with and update the patient/family, physician, and health care team regarding progress on the discharge plan  - Arrange appropriate transportation to post-acute venues    Outcome: Progressing     Problem: SLEEP DISTURBANCE  Goal: Will exhibit normal sleeping pattern  Description  Interventions:  -  Assess the patients sleep pattern, noting recent changes  - Administer medication as ordered  - Decrease environmental stimuli, including noise, as appropriate during the night  - Encourage the patient to actively participate in unit groups and or exercise during the day to enhance ability to achieve adequate sleep at night  - Assess the patient, in the morning, encouraging a description of sleep experience  Outcome: Progressing     Problem: Nutrition/Hydration-ADULT  Goal: Nutrient/Hydration intake appropriate for improving, restoring or maintaining nutritional needs  Description  Monitor and assess patient's nutrition/hydration status for malnutrition  Collaborate with interdisciplinary team and initiate plan and interventions as ordered  Monitor patient's weight and dietary intake as ordered or per policy  Utilize nutrition screening tool and intervene as necessary  Determine patient's food preferences and provide high-protein, high-caloric foods as appropriate       INTERVENTIONS:  - Monitor oral intake, urinary output, labs, and treatment plans  - Assess nutrition and hydration status and recommend course of action  - Evaluate amount of meals eaten  - Assist patient with eating if necessary   - Allow adequate time for meals  - Recommend/ encourage appropriate diets, oral nutritional supplements, and vitamin/mineral supplements  - Order, calculate, and assess calorie counts as needed  - Recommend, monitor, and adjust tube feedings and TPN/PPN based on assessed needs  - Assess need for intravenous fluids  - Provide specific nutrition/hydration education as appropriate  - Include patient/family/caregiver in decisions related to nutrition  Outcome: Progressing

## 2019-12-31 NOTE — ASSESSMENT & PLAN NOTE
Psychiatry Progress Note  Patient continues to complain that she is not able to breathe and being dependent on oxygen when she goes out  And has been consistently refusing to even go to the [place] next door to [facility] even with staff escort unless she is on a wheelchair and has portable oxygen  She was checked by [place] [employer] but they have not written a note about the portable oxygen yet  She claims to have had an anxiety attack yesterday in evening group and was hyperventilating and had to be escorted back to her room by staff  However she has not voiced any concerns about her having cancer or dying from terrible illness  and still questions her medications sometimes  She is happy that she can still go back to the [facility] rather than to the [facility] if she keeps up with her improvement  for a few more weeks in a row by continuing to attend groups more than 50% and attending to her ADLs like taking showers twice a week but today she tells me she has not taken showers in a week and plans to take one later today as opposed to taking twice a week  She is still occasionally suspicious about certain staff tampering with her spirometer and oxygen concentrator off and on and again focussed on having a portable oxygen to be able togo out   She was again reminded  that she needs to show she can go off unit and off grounds first with staff before she can go off grounds with her sister      Current medications:    Current Facility-Administered Medications:     acetaminophen (TYLENOL) tablet 650 mg, 650 mg, Oral, Q6H PRN, Dalton Garner MD    albuterol (PROVENTIL HFA,VENTOLIN HFA) inhaler 2 puff, 2 puff, Inhalation, Q4H PRN, Dalton Garner MD, 2 puff at 10/11/19 0424    aluminum-magnesium hydroxide-simethicone (MYLANTA) 200-200-20 mg/5 mL oral suspension 15 mL, 15 mL, Oral, Q4H PRN, Dalton Garner MD    ammonium lactate (LAC-HYDRIN) 12 % lotion 1 application, 1 application, Topical, BID PRN, Dalton Garner MD    benzonatate (TESSALON PERLES) capsule 100 mg, 100 mg, Oral, TID PRN, Krystyna Mcclain MD    benztropine (COGENTIN) injection 1 mg, 1 mg, Intramuscular, Q8H PRN, Krystyna Mcclain MD    carbamide peroxide (DEBROX) 6 5 % otic solution 5 drop, 5 drop, Left Ear, BID, Rona Richardson MD, 5 drop at 12/29/19 1800    cloZAPine (CLOZARIL) tablet 25 mg, 25 mg, Oral, BID, Krystyna Mcclain MD, 25 mg at 12/30/19 2147    cloZAPine (CLOZARIL) tablet 50 mg, 50 mg, Oral, BID, Krystyna Mcclain MD, 50 mg at 12/30/19 2147    EPINEPHrine PF (ADRENALIN) 1 mg/mL injection 0 15 mg, 0 15 mg, Intramuscular, Once PRN, Krystyna Mcclain MD    fluticasone-vilanterol (BREO ELLIPTA) 200-25 MCG/INH inhaler 1 puff, 1 puff, Inhalation, Daily, Krystyna Mcclain MD, 1 puff at 12/31/19 0844    ketotifen (ZADITOR) 0 025 % ophthalmic solution 1 drop, 1 drop, Right Eye, BID PRN, Krystyna Mcclain MD    levothyroxine tablet 125 mcg, 125 mcg, Oral, Early Morning, Krystyna Mcclain MD, 125 mcg at 12/31/19 0559    magnesium hydroxide (MILK OF MAGNESIA) 400 mg/5 mL oral suspension 30 mL, 30 mL, Oral, Daily PRN, Krystyna Mcclain MD    montelukast (SINGULAIR) tablet 10 mg, 10 mg, Oral, HS, Krystyna Mcclain MD, 10 mg at 12/24/19 2109    OLANZapine (ZyPREXA) IM injection 5 mg, 5 mg, Intramuscular, Q8H PRN, Krystyna Mcclain MD    OLANZapine (ZyPREXA) tablet 5 mg, 5 mg, Oral, Q8H PRN, Krystyna Mcclain MD    ondansetron (ZOFRAN-ODT) dispersible tablet 4 mg, 4 mg, Oral, Q6H PRN, Krystyna Mcclain MD, 4 mg at 12/09/19 1757    pantoprazole (PROTONIX) EC tablet 40 mg, 40 mg, Oral, Early Morning, Krystyna Mcclain MD, 40 mg at 12/31/19 0559    polyethylene glycol (MIRALAX) packet 17 g, 17 g, Oral, Daily PRN, Krystyna Mcclain MD    polyvinyl alcohol (LIQUIFILM TEARS) 1 4 % ophthalmic solution 1 drop, 1 drop, Both Eyes, Q3H PRN, Krystyna Mcclain MD    sertraline (ZOLOFT) tablet 50 mg, 50 mg, Oral, BID, Krystyna Mcclain MD, 50 mg at 12/31/19 0896    sucralfate (CARAFATE) oral suspension 1,000 mg, 1,000 mg, Oral, BID, Hema Casarez MD Melissa, 1,000 mg at 12/31/19 0600    theophylline (JEF-24) 24 hr capsule 200 mg, 200 mg, Oral, Daily, Meldon Curling, MD, 200 mg at 12/31/19 0843    tiotropium [facility]) capsule for inhaler 18 mcg, 18 mcg, Inhalation, Daily, Meldon Curling, MD, 18 mcg at 12/31/19 0844    traZODone (DESYREL) tablet 25 mg, 25 mg, Oral, HS PRN, Meldon Curling, MD  Justification if on more than two antipsychotics:  Only on his clozapine  Side effects if any:  None    Risks , benefits, side effects and precautions of medications discussed with patient and reminded patient to let us know any problems with medications     Objective:     Vital Signs:  Vitals:    12/30/19 1900 12/31/19 0643 12/31/19 0730 12/31/19 0732   BP: 90/58  115/73 100/63   BP Location: Left arm  Left arm Left arm   Pulse: 93  88 71   Resp: 15  18    Temp: (!) 97 °F (36 1 °C)  97 9 °F (36 6 °C)    TempSrc: Temporal  Temporal    SpO2: 90% 95%     Weight:       Height:         Appearance:  age appropriate, casually dressed and overweight older than stated age, appears disheveled with uncombed hair   Behavior:  evasive and guarded with no psychosomatic complaints, friendly but argumentative    Speech:  normal pitch and normal volume but circumstantial and tangential with stilted speech    Mood:  anxious and dysthymic   Affect:  mood-congruent, elated and entitled anxious and irritated and sad at times but pleasant today    Thought Process:  goal directed and illogical slightly pressured and tangential and talks as if in a court of law   Thought Content:  No current suicidal homicidal thoughts intent or plans reported  No phobias obsessions or compulsions reported  Still accusatory  to certain staff off and on    No overt delusions elicited today but still with some underlying psychosomatic complaints about not being able to breathe or swallow and preoccupied with need for portable oxygen    Perceptual Disturbances: None and does not appear responding to internal stimuli    Risk Potential: Tendency to not care for herself    Sensorium:  person, place, time/date, situation, day of week, month of year and time   Cognition:  grossly intact with no deficits in recent or remote memory or immediate recall   Consciousness:  alert and awake    Attention: Intact concentration and attention span   Intellect: Considered to be at least of average intelligence   Insight:  limited but improving   Judgment: improving      Motor Activity: no abnormal movements         Recent Labs:  Results Reviewed     None          I/O Past 24 hours:  No intake/output data recorded  No intake/output data recorded  Assessment / Plan:     Schizoaffective disorder, bipolar type ([facility] [address] )      Reason for continued inpatient care:  Because of underlying paranoia and inability to care for herself on her own and multiple somatic complaints  Acceptance by patient:  Accepting  Marianne Littlejohn in recovery:  About living in same personal care home at the [place] once she feels better  Understanding of medications :  Has some understanding  Involved in reintegration process: On off unit privileges now  Trusting in relatoinship with psychiatrist:  Trusting    Recommended Treatment:    Medication changes:  1) none today  Non-pharmacological treatments  1) continue with  individual therapy group therapy, milieu therapy and occupational therapy and milieu therapy involving multidisciplinary team approach with psychotherapist, case management, nursing, peer support services, art therapist etc using recovery principles and psycho-education about accepting illness and need for treatment     2) encourage to cooperate with behavior plan  3) encourage to attend to ADLs like taking showers and wearing clean clothes   4) Encourage to attend groups   5) see how she does on off unit privileges and encourage to go off unit with staff escort  and expectations discussed with her and she did verbalize an understanding, consult respiratory to see if she needs portable oxygen and their findings are still pending  Safety  1) Safety/communication plan established targeting dynamic risk factors above  Discharge Plan most likely back to the a:  Personal care boarding home with act services once her delusions are managed and mood becomes more stabilized and she attends to her ADLs she becomes more receptive to treatment compliance but she has not shown any improvement in the last 5 months since she has been on the unit and hence the referral needs to be initiated for the  [address] [place] [address] / Coordination of Care    Total floor / unit time spent today 15 minutes  Greater than 50% of total time was spent with the patient and / or family counseling and / or coordination of care  A description of the counseling / coordination of care  Patient's Rights, confidentiality and exceptions to confidentiality, use of automated medical record, [facility] staff access to medical record, and consent to treatment reviewed      Vincent Olivares MD

## 2019-12-31 NOTE — PLAN OF CARE
Problem: Alteration in Thoughts and Perception  Goal: Verbalize thoughts and feelings  Description  Interventions:  - Promote a nonjudgmental and trusting relationship with the patient through active listening and therapeutic communication  - Assess patient's level of functioning, behavior and potential for risk  - Engage patient in 1 on 1 interactions for a minimum of 15 minutes each session  - Encourage patient to express fears, feelings, frustrations, and discuss symptoms    - [place] patient to reality, help patient recognize reality-based thinking   - Administer medications as ordered and assess for potential side effects  - Provide the patient education related to the signs and symptoms of the illness and desired effects of prescribed medications  Outcome: Progressing  Goal: Agree to be compliant with medication regime, as prescribed and report medication side effects  Description  Interventions:  - Offer appropriate PRN medication and supervise ingestion; conduct aims, as needed   Outcome: Progressing  Goal: Attend and participate in unit activities, including therapeutic, recreational, and educational groups  Description  Interventions:  - Provide therapeutic and educational activities daily, encourage attendance and participation, and document same in the medical record     CERTIFIED PEER SPECIALIST INTERVENTIONS:    Complete peer assessment with patient to assess their needs and identify their goals to complete while in the recovery program as well as once discharged into the community  Patient will complete WRAP Plan, Crisis Plan and 5 Life Domains  Patient will attend 50% of groups offered on the unit  Patient will complete a goal card weekly      Outcome: Progressing     Problem: Depression  Goal: Refrain from isolation  Description  Interventions:  - Develop a trusting relationship   - Encourage socialization   Outcome: Progressing     Problem: RESPIRATORY - ADULT  Goal: Achieves optimal ventilation and oxygenation  Description  INTERVENTIONS:  - Assess for changes in respiratory status  - Assess for changes in mentation and behavior  - Position to facilitate oxygenation and minimize respiratory effort  - Oxygen administration by appropriate delivery method based on oxygen saturation (per order) or ABGs  - Initiate smoking cessation education as indicated  - Encourage broncho-pulmonary hygiene including cough, deep breathe, Incentive Spirometry  - Assess the need for suctioning and aspirate as needed  - Assess and instruct to report SOB or any respiratory difficulty  - Respiratory Therapy support as indicated  Outcome: Progressing     Problem: Alteration in Thoughts and Perception  Goal: Complete daily ADLs, including personal hygiene independently, as able  Description  Interventions:  - Observe, teach, and assist patient with ADLS  - Monitor and promote a balance of rest/activity, with adequate nutrition and elimination   Outcome: Not Progressing     Problem: Anxiety  Goal: Anxiety is at manageable level  Description  Interventions:  - Assess and monitor patient's anxiety level  - Monitor for signs and symptoms of anxiety both physical and emotional (heart palpitations, chest pain, shortness of breath, headaches, nausea, feeling jumpy, restlessness, irritable, apprehensive)  - Collaborate with interdisciplinary team and initiate plan and interventions as ordered  - [place] patient to unit/surroundings  - Explain treatment plan  - Encourage participation in care  - Encourage verbalization of concerns/fears  - Identify coping mechanisms  - Assist in developing anxiety-reducing skills  - Administer/offer alternative therapies  - Limit or eliminate stimulants  Outcome: Not Progressing     [address] was initially going to shower & groom, but, staff unable to assist her until after meal  By then she concluded she was too full from meal, couldn't do this task until tomorrow   For meal ate 75%, all egg salad, coffee, & Ensure  Has come for her scheduled medicines w/exception of Singulair & Debrox gtts  Has been some visible in dining room or chair by phone in [place] when phone not in use  Was willing to attend PM Group, but, while there, again experienced what she calls a "panic attack"  "I felt like I was floating out in the [place]"  Required the MHT to walk w/her back to her room after group as she clung to his elbow hyperventilating  Did settle once in room  Is mystified as why this has been happening @ evening groups  For HS snack had a cheese stick, yogurt & potato chips  Used the [employer] achieving volumes of mostly 1250ml  Is wearing now her QHS humidified nasal O2 @ 1L for bed

## 2020-01-01 ENCOUNTER — TRANSCRIBE ORDERS (OUTPATIENT)
Dept: LAB | Facility: HOSPITAL | Age: 65
End: 2020-01-01

## 2020-01-01 ENCOUNTER — APPOINTMENT (INPATIENT)
Dept: NON INVASIVE DIAGNOSTICS | Facility: HOSPITAL | Age: 65
DRG: 750 | End: 2020-01-01
Attending: INTERNAL MEDICINE
Payer: COMMERCIAL

## 2020-01-01 ENCOUNTER — TELEPHONE (OUTPATIENT)
Dept: FAMILY MEDICINE CLINIC | Facility: CLINIC | Age: 65
End: 2020-01-01

## 2020-01-01 ENCOUNTER — TELEPHONE (OUTPATIENT)
Dept: CASE MANAGEMENT | Facility: HOSPITAL | Age: 65
End: 2020-01-01

## 2020-01-01 ENCOUNTER — TRANSCRIBE ORDERS (OUTPATIENT)
Dept: ADMINISTRATIVE | Facility: HOSPITAL | Age: 65
End: 2020-01-01

## 2020-01-01 ENCOUNTER — APPOINTMENT (OUTPATIENT)
Dept: LAB | Facility: HOSPITAL | Age: 65
End: 2020-01-01
Payer: COMMERCIAL

## 2020-01-01 ENCOUNTER — HOSPITAL ENCOUNTER (EMERGENCY)
Facility: HOSPITAL | Age: 65
End: 2020-11-03
Attending: EMERGENCY MEDICINE | Admitting: EMERGENCY MEDICINE
Payer: COMMERCIAL

## 2020-01-01 ENCOUNTER — OFFICE VISIT (OUTPATIENT)
Dept: FAMILY MEDICINE CLINIC | Facility: CLINIC | Age: 65
End: 2020-01-01

## 2020-01-01 ENCOUNTER — OFFICE VISIT (OUTPATIENT)
Dept: PULMONOLOGY | Facility: CLINIC | Age: 65
End: 2020-01-01
Payer: COMMERCIAL

## 2020-01-01 ENCOUNTER — TELEPHONE (OUTPATIENT)
Dept: OTHER | Facility: OTHER | Age: 65
End: 2020-01-01

## 2020-01-01 ENCOUNTER — TELEPHONE (OUTPATIENT)
Dept: PULMONOLOGY | Facility: CLINIC | Age: 65
End: 2020-01-01

## 2020-01-01 VITALS
TEMPERATURE: 96.9 F | HEART RATE: 88 BPM | RESPIRATION RATE: 16 BRPM | BODY MASS INDEX: 23.52 KG/M2 | SYSTOLIC BLOOD PRESSURE: 118 MMHG | WEIGHT: 137 LBS | OXYGEN SATURATION: 96 % | DIASTOLIC BLOOD PRESSURE: 80 MMHG

## 2020-01-01 VITALS
WEIGHT: 143 LBS | BODY MASS INDEX: 24.41 KG/M2 | TEMPERATURE: 96.7 F | OXYGEN SATURATION: 93 % | DIASTOLIC BLOOD PRESSURE: 56 MMHG | SYSTOLIC BLOOD PRESSURE: 98 MMHG | HEART RATE: 102 BPM | RESPIRATION RATE: 19 BRPM | HEIGHT: 64 IN

## 2020-01-01 VITALS
TEMPERATURE: 98.2 F | WEIGHT: 141 LBS | HEART RATE: 97 BPM | OXYGEN SATURATION: 95 % | BODY MASS INDEX: 24.2 KG/M2 | DIASTOLIC BLOOD PRESSURE: 80 MMHG | SYSTOLIC BLOOD PRESSURE: 102 MMHG

## 2020-01-01 DIAGNOSIS — E03.9 ACQUIRED HYPOTHYROIDISM: ICD-10-CM

## 2020-01-01 DIAGNOSIS — J44.9 COPD WITH ASTHMA (HCC): ICD-10-CM

## 2020-01-01 DIAGNOSIS — Z79.899 ENCOUNTER FOR LONG-TERM (CURRENT) USE OF OTHER MEDICATIONS: ICD-10-CM

## 2020-01-01 DIAGNOSIS — K21.9 GASTROESOPHAGEAL REFLUX DISEASE WITHOUT ESOPHAGITIS: ICD-10-CM

## 2020-01-01 DIAGNOSIS — Z88.9 HISTORY OF MULTIPLE ALLERGIES: Primary | ICD-10-CM

## 2020-01-01 DIAGNOSIS — I46.9 CARDIAC ARREST (HCC): ICD-10-CM

## 2020-01-01 DIAGNOSIS — I49.9 ARRHYTHMIA: Primary | ICD-10-CM

## 2020-01-01 DIAGNOSIS — T78.2XXA ANAPHYLAXIS: ICD-10-CM

## 2020-01-01 DIAGNOSIS — Z79.899 ENCOUNTER FOR LONG-TERM (CURRENT) USE OF OTHER MEDICATIONS: Primary | ICD-10-CM

## 2020-01-01 DIAGNOSIS — R73.9 ELEVATED BLOOD SUGAR: ICD-10-CM

## 2020-01-01 DIAGNOSIS — J44.9 COPD WITH ASTHMA (HCC): Primary | ICD-10-CM

## 2020-01-01 DIAGNOSIS — G47.34 NOCTURNAL HYPOXIA: Primary | ICD-10-CM

## 2020-01-01 DIAGNOSIS — Z72.0 TOBACCO USE: ICD-10-CM

## 2020-01-01 DIAGNOSIS — R09.02 HYPOXIA: ICD-10-CM

## 2020-01-01 DIAGNOSIS — K21.9 GERD (GASTROESOPHAGEAL REFLUX DISEASE): ICD-10-CM

## 2020-01-01 DIAGNOSIS — F25.0 SCHIZOAFFECTIVE DISORDER, BIPOLAR TYPE (HCC): Chronic | ICD-10-CM

## 2020-01-01 DIAGNOSIS — Z88.9 HISTORY OF MULTIPLE ALLERGIES: ICD-10-CM

## 2020-01-01 DIAGNOSIS — K44.9 HIATAL HERNIA: ICD-10-CM

## 2020-01-01 DIAGNOSIS — F17.209 TOBACCO USE DISORDER, CONTINUOUS: ICD-10-CM

## 2020-01-01 LAB
ALBUMIN SERPL BCP-MCNC: 4.1 G/DL (ref 3–5.2)
ALP SERPL-CCNC: 116 U/L (ref 43–122)
ALT SERPL W P-5'-P-CCNC: 25 U/L (ref 9–52)
AMPHETAMINES SERPL QL SCN: NEGATIVE
ANION GAP SERPL CALCULATED.3IONS-SCNC: 6 MMOL/L (ref 5–14)
ANION GAP SERPL CALCULATED.3IONS-SCNC: 9 MMOL/L (ref 5–14)
ANISOCYTOSIS BLD QL SMEAR: PRESENT
AST SERPL W P-5'-P-CCNC: 18 U/L (ref 14–36)
ATRIAL RATE: 72 BPM
ATRIAL RATE: 73 BPM
ATRIAL RATE: 78 BPM
ATRIAL RATE: 83 BPM
ATRIAL RATE: 87 BPM
ATRIAL RATE: 98 BPM
BACTERIA UR CULT: NORMAL
BACTERIA UR QL AUTO: ABNORMAL /HPF
BARBITURATES UR QL: NEGATIVE
BASOPHILS # BLD AUTO: 0 THOUSANDS/ΜL (ref 0–0.1)
BASOPHILS # BLD AUTO: 0.1 THOUSANDS/ΜL (ref 0–0.1)
BASOPHILS NFR BLD AUTO: 0 % (ref 0–1)
BASOPHILS NFR BLD AUTO: 1 % (ref 0–1)
BENZODIAZ UR QL: NEGATIVE
BILIRUB SERPL-MCNC: 0.5 MG/DL
BILIRUB UR QL STRIP: NEGATIVE
BUN SERPL-MCNC: 18 MG/DL (ref 5–25)
BUN SERPL-MCNC: 18 MG/DL (ref 5–25)
CALCIUM SERPL-MCNC: 9.2 MG/DL (ref 8.4–10.2)
CALCIUM SERPL-MCNC: 9.7 MG/DL (ref 8.4–10.2)
CHLORIDE SERPL-SCNC: 104 MMOL/L (ref 97–108)
CHLORIDE SERPL-SCNC: 104 MMOL/L (ref 97–108)
CK SERPL-CCNC: 33 U/L (ref 30–135)
CLARITY UR: ABNORMAL
CLOZAPINE SERPL-MCNC: 205 NG/ML (ref 350–650)
CLOZAPINE SERPL-MCNC: 323 NG/ML (ref 350–650)
CLOZAPINE+NOR SERPL-MCNC: 324 NG/ML
CLOZAPINE+NOR SERPL-MCNC: 497 NG/ML
CO2 SERPL-SCNC: 27 MMOL/L (ref 22–30)
CO2 SERPL-SCNC: 29 MMOL/L (ref 22–30)
COCAINE UR QL: NEGATIVE
COLOR UR: YELLOW
CREAT SERPL-MCNC: 0.69 MG/DL (ref 0.6–1.2)
CREAT SERPL-MCNC: 0.7 MG/DL (ref 0.6–1.2)
CRP SERPL QL: 7.7 MG/L
EOSINOPHIL # BLD AUTO: 0 THOUSAND/ΜL (ref 0–0.4)
EOSINOPHIL # BLD AUTO: 0.1 THOUSAND/ΜL (ref 0–0.4)
EOSINOPHIL # BLD AUTO: 0.2 THOUSAND/ΜL (ref 0–0.4)
EOSINOPHIL NFR BLD AUTO: 0 % (ref 0–6)
EOSINOPHIL NFR BLD AUTO: 1 % (ref 0–6)
EOSINOPHIL NFR BLD AUTO: 2 % (ref 0–6)
ERYTHROCYTE [DISTWIDTH] IN BLOOD BY AUTOMATED COUNT: 14.1 %
ERYTHROCYTE [DISTWIDTH] IN BLOOD BY AUTOMATED COUNT: 14.2 %
ERYTHROCYTE [DISTWIDTH] IN BLOOD BY AUTOMATED COUNT: 14.3 %
ERYTHROCYTE [DISTWIDTH] IN BLOOD BY AUTOMATED COUNT: 14.4 %
ERYTHROCYTE [DISTWIDTH] IN BLOOD BY AUTOMATED COUNT: 14.6 %
ERYTHROCYTE [DISTWIDTH] IN BLOOD BY AUTOMATED COUNT: 14.6 %
ERYTHROCYTE [DISTWIDTH] IN BLOOD BY AUTOMATED COUNT: 14.7 %
ERYTHROCYTE [DISTWIDTH] IN BLOOD BY AUTOMATED COUNT: 14.7 %
ERYTHROCYTE [DISTWIDTH] IN BLOOD BY AUTOMATED COUNT: 15.1 %
ERYTHROCYTE [DISTWIDTH] IN BLOOD BY AUTOMATED COUNT: 15.1 %
ERYTHROCYTE [DISTWIDTH] IN BLOOD BY AUTOMATED COUNT: 15.3 %
ERYTHROCYTE [DISTWIDTH] IN BLOOD BY AUTOMATED COUNT: 15.3 %
ERYTHROCYTE [DISTWIDTH] IN BLOOD BY AUTOMATED COUNT: 15.4 %
ERYTHROCYTE [DISTWIDTH] IN BLOOD BY AUTOMATED COUNT: 15.5 %
ERYTHROCYTE [DISTWIDTH] IN BLOOD BY AUTOMATED COUNT: 15.8 %
ERYTHROCYTE [DISTWIDTH] IN BLOOD BY AUTOMATED COUNT: 15.9 %
GFR SERPL CREATININE-BSD FRML MDRD: 93 ML/MIN/1.73SQ M
GFR SERPL CREATININE-BSD FRML MDRD: 94 ML/MIN/1.73SQ M
GLUCOSE P FAST SERPL-MCNC: 88 MG/DL (ref 70–99)
GLUCOSE SERPL-MCNC: 88 MG/DL (ref 70–99)
GLUCOSE SERPL-MCNC: 96 MG/DL (ref 70–99)
GLUCOSE UR STRIP-MCNC: NEGATIVE MG/DL
HCT VFR BLD AUTO: 41 % (ref 36–46)
HCT VFR BLD AUTO: 41.3 % (ref 36–46)
HCT VFR BLD AUTO: 41.8 % (ref 36–46)
HCT VFR BLD AUTO: 42.2 % (ref 36–46)
HCT VFR BLD AUTO: 42.6 % (ref 36–46)
HCT VFR BLD AUTO: 42.8 % (ref 36–46)
HCT VFR BLD AUTO: 43.3 % (ref 36–46)
HCT VFR BLD AUTO: 43.5 % (ref 36–46)
HCT VFR BLD AUTO: 43.6 % (ref 36–46)
HCT VFR BLD AUTO: 43.9 % (ref 36–46)
HCT VFR BLD AUTO: 44 % (ref 36–46)
HCT VFR BLD AUTO: 44.4 % (ref 36–46)
HCT VFR BLD AUTO: 44.4 % (ref 36–46)
HCT VFR BLD AUTO: 44.6 % (ref 36–46)
HCT VFR BLD AUTO: 45 % (ref 36–46)
HCT VFR BLD AUTO: 45.9 % (ref 36–46)
HCT VFR BLD AUTO: 47.7 % (ref 36–46)
HGB BLD-MCNC: 13.5 G/DL (ref 12–16)
HGB BLD-MCNC: 14 G/DL (ref 12–16)
HGB BLD-MCNC: 14.1 G/DL (ref 12–16)
HGB BLD-MCNC: 14.1 G/DL (ref 12–16)
HGB BLD-MCNC: 14.2 G/DL (ref 12–16)
HGB BLD-MCNC: 14.4 G/DL (ref 12–16)
HGB BLD-MCNC: 14.4 G/DL (ref 12–16)
HGB BLD-MCNC: 14.5 G/DL (ref 12–16)
HGB BLD-MCNC: 14.6 G/DL (ref 12–16)
HGB BLD-MCNC: 14.6 G/DL (ref 12–16)
HGB BLD-MCNC: 14.7 G/DL (ref 12–16)
HGB BLD-MCNC: 14.8 G/DL (ref 12–16)
HGB BLD-MCNC: 14.8 G/DL (ref 12–16)
HGB BLD-MCNC: 14.9 G/DL (ref 12–16)
HGB BLD-MCNC: 15 G/DL (ref 12–16)
HGB BLD-MCNC: 15.3 G/DL (ref 12–16)
HGB BLD-MCNC: 15.8 G/DL (ref 12–16)
HGB UR QL STRIP.AUTO: NEGATIVE
KETONES UR STRIP-MCNC: NEGATIVE MG/DL
LEUKOCYTE ESTERASE UR QL STRIP: 100
LG PLATELETS BLD QL SMEAR: PRESENT
LYMPHOCYTES # BLD AUTO: 2.2 THOUSANDS/ΜL (ref 0.5–4)
LYMPHOCYTES # BLD AUTO: 2.3 THOUSANDS/ΜL (ref 0.5–4)
LYMPHOCYTES # BLD AUTO: 2.4 THOUSANDS/ΜL (ref 0.5–4)
LYMPHOCYTES # BLD AUTO: 2.4 THOUSANDS/ΜL (ref 0.5–4)
LYMPHOCYTES # BLD AUTO: 2.5 THOUSANDS/ΜL (ref 0.5–4)
LYMPHOCYTES # BLD AUTO: 2.5 THOUSANDS/ΜL (ref 0.5–4)
LYMPHOCYTES # BLD AUTO: 2.6 THOUSANDS/ΜL (ref 0.5–4)
LYMPHOCYTES # BLD AUTO: 2.7 THOUSANDS/ΜL (ref 0.5–4)
LYMPHOCYTES # BLD AUTO: 2.7 THOUSANDS/ΜL (ref 0.5–4)
LYMPHOCYTES # BLD AUTO: 2.8 THOUSANDS/ΜL (ref 0.5–4)
LYMPHOCYTES # BLD AUTO: 2.9 THOUSANDS/ΜL (ref 0.5–4)
LYMPHOCYTES # BLD AUTO: 2.9 THOUSANDS/ΜL (ref 0.5–4)
LYMPHOCYTES # BLD AUTO: 3.2 THOUSANDS/ΜL (ref 0.5–4)
LYMPHOCYTES # BLD AUTO: 3.2 THOUSANDS/ΜL (ref 0.5–4)
LYMPHOCYTES NFR BLD AUTO: 23 % (ref 25–45)
LYMPHOCYTES NFR BLD AUTO: 24 % (ref 25–45)
LYMPHOCYTES NFR BLD AUTO: 27 % (ref 25–45)
LYMPHOCYTES NFR BLD AUTO: 28 % (ref 25–45)
LYMPHOCYTES NFR BLD AUTO: 28 % (ref 25–45)
LYMPHOCYTES NFR BLD AUTO: 29 % (ref 25–45)
LYMPHOCYTES NFR BLD AUTO: 29 % (ref 25–45)
LYMPHOCYTES NFR BLD AUTO: 31 % (ref 25–45)
LYMPHOCYTES NFR BLD AUTO: 31 % (ref 25–45)
LYMPHOCYTES NFR BLD AUTO: 32 % (ref 25–45)
LYMPHOCYTES NFR BLD AUTO: 35 % (ref 25–45)
LYMPHOCYTES NFR BLD AUTO: 37 % (ref 25–45)
LYMPHOCYTES NFR BLD AUTO: 37 % (ref 25–45)
LYMPHOCYTES NFR BLD AUTO: 39 % (ref 25–45)
LYMPHOCYTES NFR BLD AUTO: 40 % (ref 25–45)
LYMPHOCYTES NFR BLD AUTO: 40 % (ref 25–45)
MAGNESIUM SERPL-MCNC: 2 MG/DL (ref 1.6–2.3)
MCH RBC QN AUTO: 29.7 PG (ref 26–34)
MCH RBC QN AUTO: 30.1 PG (ref 26–34)
MCH RBC QN AUTO: 30.5 PG (ref 26–34)
MCH RBC QN AUTO: 30.6 PG (ref 26–34)
MCH RBC QN AUTO: 30.7 PG (ref 26–34)
MCH RBC QN AUTO: 30.9 PG (ref 26–34)
MCH RBC QN AUTO: 31 PG (ref 26–34)
MCH RBC QN AUTO: 31 PG (ref 26–34)
MCH RBC QN AUTO: 31.1 PG (ref 26–34)
MCH RBC QN AUTO: 31.2 PG (ref 26–34)
MCH RBC QN AUTO: 31.2 PG (ref 26–34)
MCH RBC QN AUTO: 31.6 PG (ref 26–34)
MCH RBC QN AUTO: 31.6 PG (ref 26–34)
MCH RBC QN AUTO: 31.8 PG (ref 26–34)
MCH RBC QN AUTO: 31.9 PG (ref 26–34)
MCH RBC QN AUTO: 32 PG (ref 26–34)
MCH RBC QN AUTO: 32.1 PG (ref 26–34)
MCHC RBC AUTO-ENTMCNC: 32.4 G/DL (ref 31–36)
MCHC RBC AUTO-ENTMCNC: 33 G/DL (ref 31–36)
MCHC RBC AUTO-ENTMCNC: 33.1 G/DL (ref 31–36)
MCHC RBC AUTO-ENTMCNC: 33.2 G/DL (ref 31–36)
MCHC RBC AUTO-ENTMCNC: 33.2 G/DL (ref 31–36)
MCHC RBC AUTO-ENTMCNC: 33.3 G/DL (ref 31–36)
MCHC RBC AUTO-ENTMCNC: 33.4 G/DL (ref 31–36)
MCHC RBC AUTO-ENTMCNC: 33.4 G/DL (ref 31–36)
MCHC RBC AUTO-ENTMCNC: 33.5 G/DL (ref 31–36)
MCHC RBC AUTO-ENTMCNC: 33.5 G/DL (ref 31–36)
MCHC RBC AUTO-ENTMCNC: 33.6 G/DL (ref 31–36)
MCHC RBC AUTO-ENTMCNC: 33.6 G/DL (ref 31–36)
MCHC RBC AUTO-ENTMCNC: 33.7 G/DL (ref 31–36)
MCHC RBC AUTO-ENTMCNC: 33.7 G/DL (ref 31–36)
MCHC RBC AUTO-ENTMCNC: 33.8 G/DL (ref 31–36)
MCHC RBC AUTO-ENTMCNC: 34.1 G/DL (ref 31–36)
MCHC RBC AUTO-ENTMCNC: 34.1 G/DL (ref 31–36)
MCV RBC AUTO: 91 FL (ref 80–100)
MCV RBC AUTO: 91 FL (ref 80–100)
MCV RBC AUTO: 92 FL (ref 80–100)
MCV RBC AUTO: 93 FL (ref 80–100)
MCV RBC AUTO: 94 FL (ref 80–100)
MCV RBC AUTO: 95 FL (ref 80–100)
MCV RBC AUTO: 95 FL (ref 80–100)
MCV RBC AUTO: 96 FL (ref 80–100)
METHADONE UR QL: NEGATIVE
MONOCYTES # BLD AUTO: 0.6 THOUSAND/ΜL (ref 0.2–0.9)
MONOCYTES # BLD AUTO: 0.7 THOUSAND/ΜL (ref 0.2–0.9)
MONOCYTES # BLD AUTO: 0.7 THOUSAND/ΜL (ref 0.2–0.9)
MONOCYTES # BLD AUTO: 0.8 THOUSAND/ΜL (ref 0.2–0.9)
MONOCYTES # BLD AUTO: 0.9 THOUSAND/ΜL (ref 0.2–0.9)
MONOCYTES # BLD AUTO: 1 THOUSAND/ΜL (ref 0.2–0.9)
MONOCYTES NFR BLD AUTO: 10 % (ref 1–10)
MONOCYTES NFR BLD AUTO: 11 % (ref 1–10)
MONOCYTES NFR BLD AUTO: 11 % (ref 1–10)
MONOCYTES NFR BLD AUTO: 8 % (ref 1–10)
MONOCYTES NFR BLD AUTO: 9 % (ref 1–10)
NEUTROPHILS # BLD AUTO: 3.3 THOUSANDS/ΜL (ref 1.8–7.8)
NEUTROPHILS # BLD AUTO: 3.6 THOUSANDS/ΜL (ref 1.8–7.8)
NEUTROPHILS # BLD AUTO: 3.7 THOUSANDS/ΜL (ref 1.8–7.8)
NEUTROPHILS # BLD AUTO: 3.9 THOUSANDS/ΜL (ref 1.8–7.8)
NEUTROPHILS # BLD AUTO: 3.9 THOUSANDS/ΜL (ref 1.8–7.8)
NEUTROPHILS # BLD AUTO: 4.3 THOUSANDS/ΜL (ref 1.8–7.8)
NEUTROPHILS # BLD AUTO: 4.3 THOUSANDS/ΜL (ref 1.8–7.8)
NEUTROPHILS # BLD AUTO: 4.6 THOUSANDS/ΜL (ref 1.8–7.8)
NEUTROPHILS # BLD AUTO: 4.8 THOUSANDS/ΜL (ref 1.8–7.8)
NEUTROPHILS # BLD AUTO: 5 THOUSANDS/ΜL (ref 1.8–7.8)
NEUTROPHILS # BLD AUTO: 5 THOUSANDS/ΜL (ref 1.8–7.8)
NEUTROPHILS # BLD AUTO: 5.1 THOUSANDS/ΜL (ref 1.8–7.8)
NEUTROPHILS # BLD AUTO: 5.1 THOUSANDS/ΜL (ref 1.8–7.8)
NEUTROPHILS # BLD AUTO: 5.2 THOUSANDS/ΜL (ref 1.8–7.8)
NEUTROPHILS # BLD AUTO: 5.2 THOUSANDS/ΜL (ref 1.8–7.8)
NEUTROPHILS # BLD AUTO: 6 THOUSANDS/ΜL (ref 1.8–7.8)
NEUTROPHILS # BLD AUTO: 6.8 THOUSANDS/ΜL (ref 1.8–7.8)
NEUTS SEG NFR BLD AUTO: 47 % (ref 45–65)
NEUTS SEG NFR BLD AUTO: 48 % (ref 45–65)
NEUTS SEG NFR BLD AUTO: 50 % (ref 45–65)
NEUTS SEG NFR BLD AUTO: 51 % (ref 45–65)
NEUTS SEG NFR BLD AUTO: 52 % (ref 45–65)
NEUTS SEG NFR BLD AUTO: 52 % (ref 45–65)
NEUTS SEG NFR BLD AUTO: 53 % (ref 45–65)
NEUTS SEG NFR BLD AUTO: 55 % (ref 45–65)
NEUTS SEG NFR BLD AUTO: 58 % (ref 45–65)
NEUTS SEG NFR BLD AUTO: 59 % (ref 45–65)
NEUTS SEG NFR BLD AUTO: 59 % (ref 45–65)
NEUTS SEG NFR BLD AUTO: 60 % (ref 45–65)
NEUTS SEG NFR BLD AUTO: 60 % (ref 45–65)
NEUTS SEG NFR BLD AUTO: 62 % (ref 45–65)
NEUTS SEG NFR BLD AUTO: 64 % (ref 45–65)
NEUTS SEG NFR BLD AUTO: 66 % (ref 45–65)
NEUTS SEG NFR BLD AUTO: 69 % (ref 45–65)
NITRITE UR QL STRIP: NEGATIVE
NON-SQ EPI CELLS URNS QL MICRO: ABNORMAL /HPF
NORCLOZAPINE SERPL-MCNC: 119 NG/ML
NORCLOZAPINE SERPL-MCNC: 174 NG/ML
NT-PROBNP SERPL-MCNC: 296 PG/ML (ref 0–299)
OPIATES UR QL SCN: NEGATIVE
P AXIS: 70 DEGREES
P AXIS: 72 DEGREES
P AXIS: 73 DEGREES
P AXIS: 75 DEGREES
P AXIS: 76 DEGREES
PCP UR QL: NEGATIVE
PH UR STRIP.AUTO: 8 [PH]
PLATELET # BLD AUTO: 150 THOUSANDS/UL (ref 150–450)
PLATELET # BLD AUTO: 183 THOUSANDS/UL (ref 150–450)
PLATELET # BLD AUTO: 188 THOUSANDS/UL (ref 150–450)
PLATELET # BLD AUTO: 189 THOUSANDS/UL (ref 150–450)
PLATELET # BLD AUTO: 193 THOUSANDS/UL (ref 150–450)
PLATELET # BLD AUTO: 195 THOUSANDS/UL (ref 150–450)
PLATELET # BLD AUTO: 200 THOUSANDS/UL (ref 150–450)
PLATELET # BLD AUTO: 201 THOUSANDS/UL (ref 150–450)
PLATELET # BLD AUTO: 208 THOUSANDS/UL (ref 150–450)
PLATELET # BLD AUTO: 209 THOUSANDS/UL (ref 150–450)
PLATELET # BLD AUTO: 210 THOUSANDS/UL (ref 150–450)
PLATELET # BLD AUTO: 211 THOUSANDS/UL (ref 150–450)
PLATELET # BLD AUTO: 213 THOUSANDS/UL (ref 150–450)
PLATELET # BLD AUTO: 215 THOUSANDS/UL (ref 150–450)
PLATELET # BLD AUTO: 217 THOUSANDS/UL (ref 150–450)
PLATELET # BLD AUTO: 219 THOUSANDS/UL (ref 150–450)
PLATELET # BLD AUTO: 235 THOUSANDS/UL (ref 150–450)
PLATELET # BLD AUTO: 240 THOUSANDS/UL (ref 150–450)
PLATELET # BLD AUTO: 250 THOUSANDS/UL (ref 150–450)
PLATELET BLD QL SMEAR: ABNORMAL
PLATELET BLD QL SMEAR: ADEQUATE
PMV BLD AUTO: 10.1 FL (ref 8.9–12.7)
PMV BLD AUTO: 10.4 FL (ref 8.9–12.7)
PMV BLD AUTO: 10.4 FL (ref 8.9–12.7)
PMV BLD AUTO: 10.6 FL (ref 8.9–12.7)
PMV BLD AUTO: 10.6 FL (ref 8.9–12.7)
PMV BLD AUTO: 9.2 FL (ref 8.9–12.7)
PMV BLD AUTO: 9.3 FL (ref 8.9–12.7)
PMV BLD AUTO: 9.5 FL (ref 8.9–12.7)
PMV BLD AUTO: 9.5 FL (ref 8.9–12.7)
PMV BLD AUTO: 9.7 FL (ref 8.9–12.7)
PMV BLD AUTO: 9.8 FL (ref 8.9–12.7)
PMV BLD AUTO: 9.8 FL (ref 8.9–12.7)
PMV BLD AUTO: 9.9 FL (ref 8.9–12.7)
PMV BLD AUTO: 9.9 FL (ref 8.9–12.7)
POTASSIUM SERPL-SCNC: 3.9 MMOL/L (ref 3.6–5)
POTASSIUM SERPL-SCNC: 3.9 MMOL/L (ref 3.6–5)
PR INTERVAL: 150 MS
PR INTERVAL: 158 MS
PR INTERVAL: 162 MS
PR INTERVAL: 164 MS
PR INTERVAL: 176 MS
PROT SERPL-MCNC: 7.4 G/DL (ref 5.9–8.4)
PROT UR STRIP-MCNC: NEGATIVE MG/DL
QRS AXIS: -60 DEGREES
QRS AXIS: -63 DEGREES
QRS AXIS: -66 DEGREES
QRS AXIS: -75 DEGREES
QRSD INTERVAL: 74 MS
QRSD INTERVAL: 80 MS
QRSD INTERVAL: 84 MS
QRSD INTERVAL: 86 MS
QRSD INTERVAL: 86 MS
QRSD INTERVAL: 94 MS
QT INTERVAL: 318 MS
QT INTERVAL: 358 MS
QT INTERVAL: 392 MS
QT INTERVAL: 412 MS
QT INTERVAL: 416 MS
QT INTERVAL: 424 MS
QTC INTERVAL: 451 MS
QTC INTERVAL: 457 MS
QTC INTERVAL: 458 MS
QTC INTERVAL: 460 MS
QTC INTERVAL: 469 MS
QTC INTERVAL: 483 MS
RBC # BLD AUTO: 4.49 MILLION/UL (ref 4–5.2)
RBC # BLD AUTO: 4.5 MILLION/UL (ref 4–5.2)
RBC # BLD AUTO: 4.56 MILLION/UL (ref 4–5.2)
RBC # BLD AUTO: 4.57 MILLION/UL (ref 4–5.2)
RBC # BLD AUTO: 4.61 MILLION/UL (ref 4–5.2)
RBC # BLD AUTO: 4.63 MILLION/UL (ref 4–5.2)
RBC # BLD AUTO: 4.64 MILLION/UL (ref 4–5.2)
RBC # BLD AUTO: 4.67 MILLION/UL (ref 4–5.2)
RBC # BLD AUTO: 4.7 MILLION/UL (ref 4–5.2)
RBC # BLD AUTO: 4.71 MILLION/UL (ref 4–5.2)
RBC # BLD AUTO: 4.73 MILLION/UL (ref 4–5.2)
RBC # BLD AUTO: 4.79 MILLION/UL (ref 4–5.2)
RBC # BLD AUTO: 4.83 MILLION/UL (ref 4–5.2)
RBC # BLD AUTO: 4.87 MILLION/UL (ref 4–5.2)
RBC # BLD AUTO: 5.08 MILLION/UL (ref 4–5.2)
RBC #/AREA URNS AUTO: ABNORMAL /HPF
RBC MORPH BLD: ABNORMAL
RBC MORPH BLD: NORMAL
SARS-COV-2 RNA RESP QL NAA+PROBE: NEGATIVE
SL AMB POCT HEMOGLOBIN AIC: 5.5 (ref ?–6.5)
SODIUM SERPL-SCNC: 139 MMOL/L (ref 137–147)
SODIUM SERPL-SCNC: 140 MMOL/L (ref 137–147)
SP GR UR STRIP.AUTO: 1.01 (ref 1–1.04)
T WAVE AXIS: 36 DEGREES
T WAVE AXIS: 43 DEGREES
T WAVE AXIS: 47 DEGREES
T WAVE AXIS: 61 DEGREES
T WAVE AXIS: 62 DEGREES
T WAVE AXIS: 75 DEGREES
THC UR QL: NEGATIVE
TROPONIN I SERPL-MCNC: <0.01 NG/ML (ref 0–0.03)
TSH SERPL DL<=0.05 MIU/L-ACNC: 2.23 UIU/ML (ref 0.47–4.68)
UROBILINOGEN UA: NEGATIVE MG/DL
VENTRICULAR RATE: 131 BPM
VENTRICULAR RATE: 72 BPM
VENTRICULAR RATE: 73 BPM
VENTRICULAR RATE: 78 BPM
VENTRICULAR RATE: 83 BPM
VENTRICULAR RATE: 98 BPM
WBC # BLD AUTO: 6.3 THOUSAND/UL (ref 4.5–11)
WBC # BLD AUTO: 7.3 THOUSAND/UL (ref 4.5–11)
WBC # BLD AUTO: 7.3 THOUSAND/UL (ref 4.5–11)
WBC # BLD AUTO: 7.4 THOUSAND/UL (ref 4.5–11)
WBC # BLD AUTO: 7.7 THOUSAND/UL (ref 4.5–11)
WBC # BLD AUTO: 7.7 THOUSAND/UL (ref 4.5–11)
WBC # BLD AUTO: 7.9 THOUSAND/UL (ref 4.5–11)
WBC # BLD AUTO: 8 THOUSAND/UL (ref 4.5–11)
WBC # BLD AUTO: 8 THOUSAND/UL (ref 4.5–11)
WBC # BLD AUTO: 8.2 THOUSAND/UL (ref 4.5–11)
WBC # BLD AUTO: 8.2 THOUSAND/UL (ref 4.5–11)
WBC # BLD AUTO: 8.3 THOUSAND/UL (ref 4.5–11)
WBC # BLD AUTO: 8.5 THOUSAND/UL (ref 4.5–11)
WBC # BLD AUTO: 8.6 THOUSAND/UL (ref 4.5–11)
WBC # BLD AUTO: 8.7 THOUSAND/UL (ref 4.5–11)
WBC # BLD AUTO: 8.7 THOUSAND/UL (ref 4.5–11)
WBC # BLD AUTO: 8.8 THOUSAND/UL (ref 4.5–11)
WBC # BLD AUTO: 9.1 THOUSAND/UL (ref 4.5–11)
WBC # BLD AUTO: 9.9 THOUSAND/UL (ref 4.5–11)
WBC #/AREA URNS AUTO: ABNORMAL /HPF

## 2020-01-01 PROCEDURE — 99232 SBSQ HOSP IP/OBS MODERATE 35: CPT | Performed by: PSYCHIATRY & NEUROLOGY

## 2020-01-01 PROCEDURE — 93005 ELECTROCARDIOGRAM TRACING: CPT

## 2020-01-01 PROCEDURE — 93227 XTRNL ECG REC<48 HR R&I: CPT

## 2020-01-01 PROCEDURE — 80048 BASIC METABOLIC PNL TOTAL CA: CPT | Performed by: NURSE PRACTITIONER

## 2020-01-01 PROCEDURE — 83880 ASSAY OF NATRIURETIC PEPTIDE: CPT | Performed by: PSYCHIATRY & NEUROLOGY

## 2020-01-01 PROCEDURE — 99232 SBSQ HOSP IP/OBS MODERATE 35: CPT | Performed by: PHYSICIAN ASSISTANT

## 2020-01-01 PROCEDURE — 85025 COMPLETE CBC W/AUTO DIFF WBC: CPT | Performed by: PSYCHIATRY & NEUROLOGY

## 2020-01-01 PROCEDURE — 93010 ELECTROCARDIOGRAM REPORT: CPT | Performed by: INTERNAL MEDICINE

## 2020-01-01 PROCEDURE — 4004F PT TOBACCO SCREEN RCVD TLK: CPT | Performed by: INTERNAL MEDICINE

## 2020-01-01 PROCEDURE — 99232 SBSQ HOSP IP/OBS MODERATE 35: CPT | Performed by: NURSE PRACTITIONER

## 2020-01-01 PROCEDURE — 85025 COMPLETE CBC W/AUTO DIFF WBC: CPT

## 2020-01-01 PROCEDURE — 99231 SBSQ HOSP IP/OBS SF/LOW 25: CPT | Performed by: PSYCHIATRY & NEUROLOGY

## 2020-01-01 PROCEDURE — 93225 XTRNL ECG REC<48 HRS REC: CPT

## 2020-01-01 PROCEDURE — 99254 IP/OBS CNSLTJ NEW/EST MOD 60: CPT | Performed by: INTERNAL MEDICINE

## 2020-01-01 PROCEDURE — 92950 HEART/LUNG RESUSCITATION CPR: CPT | Performed by: EMERGENCY MEDICINE

## 2020-01-01 PROCEDURE — 99214 OFFICE O/P EST MOD 30 MIN: CPT | Performed by: PHYSICIAN ASSISTANT

## 2020-01-01 PROCEDURE — NC001 PR NO CHARGE: Performed by: PSYCHIATRY & NEUROLOGY

## 2020-01-01 PROCEDURE — 99282 EMERGENCY DEPT VISIT SF MDM: CPT | Performed by: EMERGENCY MEDICINE

## 2020-01-01 PROCEDURE — 99232 SBSQ HOSP IP/OBS MODERATE 35: CPT | Performed by: FAMILY MEDICINE

## 2020-01-01 PROCEDURE — 99233 SBSQ HOSP IP/OBS HIGH 50: CPT | Performed by: PSYCHIATRY & NEUROLOGY

## 2020-01-01 PROCEDURE — 94618 PULMONARY STRESS TESTING: CPT | Performed by: INTERNAL MEDICINE

## 2020-01-01 PROCEDURE — 83735 ASSAY OF MAGNESIUM: CPT | Performed by: PSYCHIATRY & NEUROLOGY

## 2020-01-01 PROCEDURE — 94760 N-INVAS EAR/PLS OXIMETRY 1: CPT

## 2020-01-01 PROCEDURE — 84443 ASSAY THYROID STIM HORMONE: CPT | Performed by: PSYCHIATRY & NEUROLOGY

## 2020-01-01 PROCEDURE — 1111F DSCHRG MED/CURRENT MED MERGE: CPT | Performed by: PHYSICIAN ASSISTANT

## 2020-01-01 PROCEDURE — 99222 1ST HOSP IP/OBS MODERATE 55: CPT | Performed by: FAMILY MEDICINE

## 2020-01-01 PROCEDURE — 80159 DRUG ASSAY CLOZAPINE: CPT | Performed by: PSYCHIATRY & NEUROLOGY

## 2020-01-01 PROCEDURE — 99238 HOSP IP/OBS DSCHRG MGMT 30/<: CPT | Performed by: PSYCHIATRY & NEUROLOGY

## 2020-01-01 PROCEDURE — 94762 N-INVAS EAR/PLS OXIMTRY CONT: CPT

## 2020-01-01 PROCEDURE — 92950 HEART/LUNG RESUSCITATION CPR: CPT

## 2020-01-01 PROCEDURE — 82550 ASSAY OF CK (CPK): CPT | Performed by: PSYCHIATRY & NEUROLOGY

## 2020-01-01 PROCEDURE — 99214 OFFICE O/P EST MOD 30 MIN: CPT | Performed by: INTERNAL MEDICINE

## 2020-01-01 PROCEDURE — 36415 COLL VENOUS BLD VENIPUNCTURE: CPT

## 2020-01-01 PROCEDURE — 80307 DRUG TEST PRSMV CHEM ANLYZR: CPT | Performed by: PSYCHIATRY & NEUROLOGY

## 2020-01-01 PROCEDURE — 84484 ASSAY OF TROPONIN QUANT: CPT | Performed by: PSYCHIATRY & NEUROLOGY

## 2020-01-01 PROCEDURE — 76604 US EXAM CHEST: CPT | Performed by: EMERGENCY MEDICINE

## 2020-01-01 PROCEDURE — 86140 C-REACTIVE PROTEIN: CPT | Performed by: PSYCHIATRY & NEUROLOGY

## 2020-01-01 PROCEDURE — 99285 EMERGENCY DEPT VISIT HI MDM: CPT

## 2020-01-01 PROCEDURE — 99233 SBSQ HOSP IP/OBS HIGH 50: CPT | Performed by: INTERNAL MEDICINE

## 2020-01-01 PROCEDURE — 83036 HEMOGLOBIN GLYCOSYLATED A1C: CPT | Performed by: PHYSICIAN ASSISTANT

## 2020-01-01 PROCEDURE — 94761 N-INVAS EAR/PLS OXIMETRY MLT: CPT

## 2020-01-01 PROCEDURE — NC001 PR NO CHARGE: Performed by: NURSE PRACTITIONER

## 2020-01-01 PROCEDURE — 81001 URINALYSIS AUTO W/SCOPE: CPT | Performed by: PSYCHIATRY & NEUROLOGY

## 2020-01-01 PROCEDURE — 87086 URINE CULTURE/COLONY COUNT: CPT | Performed by: PSYCHIATRY & NEUROLOGY

## 2020-01-01 PROCEDURE — 93226 XTRNL ECG REC<48 HR SCAN A/R: CPT

## 2020-01-01 PROCEDURE — 80053 COMPREHEN METABOLIC PANEL: CPT | Performed by: PSYCHIATRY & NEUROLOGY

## 2020-01-01 PROCEDURE — 1111F DSCHRG MED/CURRENT MED MERGE: CPT | Performed by: INTERNAL MEDICINE

## 2020-01-01 PROCEDURE — 87635 SARS-COV-2 COVID-19 AMP PRB: CPT | Performed by: PSYCHIATRY & NEUROLOGY

## 2020-01-01 PROCEDURE — 4004F PT TOBACCO SCREEN RCVD TLK: CPT | Performed by: PHYSICIAN ASSISTANT

## 2020-01-01 RX ORDER — LEVOTHYROXINE SODIUM 0.12 MG/1
125 TABLET ORAL
Status: DISCONTINUED | OUTPATIENT
Start: 2020-01-01 | End: 2020-01-01 | Stop reason: HOSPADM

## 2020-01-01 RX ORDER — MAGNESIUM HYDROXIDE/ALUMINUM HYDROXICE/SIMETHICONE 120; 1200; 1200 MG/30ML; MG/30ML; MG/30ML
15 SUSPENSION ORAL EVERY 4 HOURS PRN
Qty: 355 ML | Refills: 0 | Status: SHIPPED | OUTPATIENT
Start: 2020-01-01

## 2020-01-01 RX ORDER — EPINEPHRINE 1 MG/ML
0.15 INJECTION, SOLUTION, CONCENTRATE INTRAVENOUS ONCE AS NEEDED
Qty: 0.15 ML | Refills: 0 | Status: SHIPPED | OUTPATIENT
Start: 2020-01-01

## 2020-01-01 RX ORDER — POLYVINYL ALCOHOL 14 MG/ML
1 SOLUTION/ DROPS OPHTHALMIC
Qty: 15 ML | Refills: 0 | Status: SHIPPED | OUTPATIENT
Start: 2020-01-01

## 2020-01-01 RX ORDER — ACETAMINOPHEN 325 MG/1
325 TABLET ORAL EVERY 6 HOURS PRN
Status: DISCONTINUED | OUTPATIENT
Start: 2020-01-01 | End: 2020-01-01 | Stop reason: SDUPTHER

## 2020-01-01 RX ORDER — DOCUSATE SODIUM 100 MG/1
100 CAPSULE, LIQUID FILLED ORAL 2 TIMES DAILY PRN
Qty: 10 CAPSULE | Refills: 0 | Status: SHIPPED | OUTPATIENT
Start: 2020-01-01

## 2020-01-01 RX ORDER — PANTOPRAZOLE SODIUM 40 MG/1
40 TABLET, DELAYED RELEASE ORAL
Qty: 30 TABLET | Refills: 0 | Status: SHIPPED | OUTPATIENT
Start: 2020-01-01 | End: 2020-01-01 | Stop reason: SDUPTHER

## 2020-01-01 RX ORDER — ONDANSETRON 4 MG/1
4 TABLET, ORALLY DISINTEGRATING ORAL EVERY 6 HOURS PRN
Qty: 30 TABLET | Refills: 2 | Status: SHIPPED | OUTPATIENT
Start: 2020-01-01

## 2020-01-01 RX ORDER — LEVOTHYROXINE SODIUM 0.12 MG/1
125 TABLET ORAL
Qty: 30 TABLET | Refills: 0 | Status: SHIPPED | OUTPATIENT
Start: 2020-01-01 | End: 2020-01-01 | Stop reason: SDUPTHER

## 2020-01-01 RX ORDER — ONDANSETRON 4 MG/1
4 TABLET, ORALLY DISINTEGRATING ORAL EVERY 6 HOURS PRN
Qty: 20 TABLET | Refills: 0 | Status: SHIPPED | OUTPATIENT
Start: 2020-01-01 | End: 2020-01-01 | Stop reason: SDUPTHER

## 2020-01-01 RX ORDER — ACETAMINOPHEN 325 MG/1
325 TABLET ORAL EVERY 6 HOURS PRN
Status: DISCONTINUED | OUTPATIENT
Start: 2020-01-01 | End: 2020-01-01 | Stop reason: HOSPADM

## 2020-01-01 RX ORDER — AMMONIUM LACTATE 12 G/100G
1 LOTION TOPICAL 2 TIMES DAILY PRN
Qty: 400 G | Refills: 0 | Status: SHIPPED | OUTPATIENT
Start: 2020-01-01

## 2020-01-01 RX ORDER — SUCRALFATE ORAL 1 G/10ML
1000 SUSPENSION ORAL 2 TIMES DAILY
Qty: 1800 ML | Refills: 1 | Status: SHIPPED | OUTPATIENT
Start: 2020-01-01

## 2020-01-01 RX ORDER — KETOTIFEN FUMARATE 0.35 MG/ML
1 SOLUTION/ DROPS OPHTHALMIC 2 TIMES DAILY PRN
Qty: 5 ML | Refills: 0 | Status: SHIPPED | OUTPATIENT
Start: 2020-01-01

## 2020-01-01 RX ORDER — POLYETHYLENE GLYCOL 3350 17 G/17G
17 POWDER, FOR SOLUTION ORAL DAILY PRN
Qty: 14 EACH | Refills: 0 | Status: SHIPPED | OUTPATIENT
Start: 2020-01-01

## 2020-01-01 RX ORDER — SERTRALINE HYDROCHLORIDE 100 MG/1
200 TABLET, FILM COATED ORAL DAILY
Qty: 60 TABLET | Refills: 0 | Status: SHIPPED | OUTPATIENT
Start: 2020-01-01

## 2020-01-01 RX ORDER — HYDROXYZINE HYDROCHLORIDE 25 MG/1
25 TABLET, FILM COATED ORAL EVERY 6 HOURS PRN
Status: DISCONTINUED | OUTPATIENT
Start: 2020-01-01 | End: 2020-01-01 | Stop reason: HOSPADM

## 2020-01-01 RX ORDER — MONTELUKAST SODIUM 10 MG/1
10 TABLET ORAL
Qty: 30 TABLET | Refills: 0 | Status: SHIPPED | OUTPATIENT
Start: 2020-01-01 | End: 2020-01-01 | Stop reason: HOSPADM

## 2020-01-01 RX ORDER — ACETAMINOPHEN 325 MG/1
650 TABLET ORAL EVERY 8 HOURS PRN
Status: DISCONTINUED | OUTPATIENT
Start: 2020-01-01 | End: 2020-01-01 | Stop reason: HOSPADM

## 2020-01-01 RX ORDER — DOCUSATE SODIUM 100 MG/1
100 CAPSULE, LIQUID FILLED ORAL 2 TIMES DAILY
Status: DISCONTINUED | OUTPATIENT
Start: 2020-01-01 | End: 2020-01-01

## 2020-01-01 RX ORDER — GINSENG 100 MG
1 CAPSULE ORAL DAILY
Status: DISCONTINUED | OUTPATIENT
Start: 2020-01-01 | End: 2020-01-01

## 2020-01-01 RX ORDER — EPINEPHRINE 0.3 MG/.3ML
0.3 INJECTION SUBCUTANEOUS ONCE
Qty: 0.6 ML | Refills: 1 | Status: SHIPPED | OUTPATIENT
Start: 2020-01-01 | End: 2020-01-01 | Stop reason: SDUPTHER

## 2020-01-01 RX ORDER — LEVOTHYROXINE SODIUM 0.12 MG/1
125 TABLET ORAL
Qty: 90 TABLET | Refills: 1 | Status: SHIPPED | OUTPATIENT
Start: 2020-01-01

## 2020-01-01 RX ORDER — ALBUTEROL SULFATE 90 UG/1
2 AEROSOL, METERED RESPIRATORY (INHALATION) EVERY 4 HOURS PRN
Qty: 1 INHALER | Refills: 3 | Status: SHIPPED | OUTPATIENT
Start: 2020-01-01

## 2020-01-01 RX ORDER — DOCUSATE SODIUM 100 MG/1
100 CAPSULE, LIQUID FILLED ORAL 2 TIMES DAILY PRN
Status: DISCONTINUED | OUTPATIENT
Start: 2020-01-01 | End: 2020-01-01

## 2020-01-01 RX ORDER — DOCUSATE SODIUM 100 MG/1
100 CAPSULE, LIQUID FILLED ORAL 2 TIMES DAILY PRN
Status: DISCONTINUED | OUTPATIENT
Start: 2020-01-01 | End: 2020-01-01 | Stop reason: HOSPADM

## 2020-01-01 RX ORDER — EPINEPHRINE 0.3 MG/.3ML
0.3 INJECTION SUBCUTANEOUS ONCE
Qty: 0.6 ML | Refills: 1 | Status: SHIPPED | OUTPATIENT
Start: 2020-01-01 | End: 2020-01-01

## 2020-01-01 RX ORDER — CLOZAPINE 50 MG/1
50 TABLET ORAL 2 TIMES DAILY
Qty: 60 TABLET | Refills: 0 | Status: SHIPPED | OUTPATIENT
Start: 2020-01-01

## 2020-01-01 RX ORDER — ALBUTEROL SULFATE 90 UG/1
2 AEROSOL, METERED RESPIRATORY (INHALATION) EVERY 4 HOURS PRN
Qty: 1 INHALER | Refills: 0 | Status: SHIPPED | OUTPATIENT
Start: 2020-01-01 | End: 2020-01-01 | Stop reason: SDUPTHER

## 2020-01-01 RX ORDER — ARIPIPRAZOLE 2 MG/1
2 TABLET ORAL DAILY
Status: DISCONTINUED | OUTPATIENT
Start: 2020-01-01 | End: 2020-01-01

## 2020-01-01 RX ORDER — BENZONATATE 100 MG/1
100 CAPSULE ORAL 3 TIMES DAILY PRN
Qty: 20 CAPSULE | Refills: 0 | Status: SHIPPED | OUTPATIENT
Start: 2020-01-01

## 2020-01-01 RX ORDER — ACETAMINOPHEN 325 MG/1
325 TABLET ORAL EVERY 6 HOURS PRN
Qty: 30 TABLET | Refills: 0 | Status: SHIPPED | OUTPATIENT
Start: 2020-01-01

## 2020-01-01 RX ORDER — EPINEPHRINE 1 MG/ML
0.15 INJECTION, SOLUTION, CONCENTRATE INTRAVENOUS ONCE AS NEEDED
Qty: 0.15 ML | Refills: 0 | Status: SHIPPED | OUTPATIENT
Start: 2020-01-01 | End: 2020-01-01 | Stop reason: SDUPTHER

## 2020-01-01 RX ORDER — PANTOPRAZOLE SODIUM 40 MG/1
40 TABLET, DELAYED RELEASE ORAL 2 TIMES DAILY
Qty: 180 TABLET | Refills: 1 | Status: SHIPPED | OUTPATIENT
Start: 2020-01-01

## 2020-01-01 RX ORDER — SUCRALFATE ORAL 1 G/10ML
1000 SUSPENSION ORAL 2 TIMES DAILY
Qty: 420 ML | Refills: 0 | Status: SHIPPED | OUTPATIENT
Start: 2020-01-01 | End: 2020-01-01 | Stop reason: SDUPTHER

## 2020-01-01 RX ORDER — FLUTICASONE FUROATE AND VILANTEROL 200; 25 UG/1; UG/1
1 POWDER RESPIRATORY (INHALATION) DAILY
Qty: 1 INHALER | Refills: 3 | Status: SHIPPED | OUTPATIENT
Start: 2020-01-01 | End: 2020-01-01 | Stop reason: CLARIF

## 2020-01-01 RX ORDER — CLOZAPINE 25 MG/1
25 TABLET ORAL 2 TIMES DAILY
Qty: 60 TABLET | Refills: 0 | Status: SHIPPED | OUTPATIENT
Start: 2020-01-01

## 2020-01-01 RX ORDER — FLUTICASONE FUROATE AND VILANTEROL 200; 25 UG/1; UG/1
1 POWDER RESPIRATORY (INHALATION) DAILY
Qty: 1 INHALER | Refills: 0 | Status: SHIPPED | OUTPATIENT
Start: 2020-01-01 | End: 2020-01-01 | Stop reason: SDUPTHER

## 2020-01-01 RX ADMIN — CLOZAPINE 50 MG: 25 TABLET ORAL at 20:33

## 2020-01-01 RX ADMIN — PANTOPRAZOLE SODIUM 40 MG: 40 TABLET, DELAYED RELEASE ORAL at 06:14

## 2020-01-01 RX ADMIN — SUCRALFATE 1000 MG: 1 SUSPENSION ORAL at 16:23

## 2020-01-01 RX ADMIN — CLOZAPINE 50 MG: 25 TABLET ORAL at 17:18

## 2020-01-01 RX ADMIN — TIOTROPIUM BROMIDE 18 MCG: 18 CAPSULE ORAL; RESPIRATORY (INHALATION) at 09:06

## 2020-01-01 RX ADMIN — CLOZAPINE 25 MG: 25 TABLET ORAL at 16:57

## 2020-01-01 RX ADMIN — PANTOPRAZOLE SODIUM 40 MG: 40 TABLET, DELAYED RELEASE ORAL at 06:02

## 2020-01-01 RX ADMIN — SERTRALINE HYDROCHLORIDE 175 MG: 50 TABLET ORAL at 08:15

## 2020-01-01 RX ADMIN — SERTRALINE HYDROCHLORIDE 175 MG: 50 TABLET ORAL at 08:56

## 2020-01-01 RX ADMIN — TIOTROPIUM BROMIDE 18 MCG: 18 CAPSULE ORAL; RESPIRATORY (INHALATION) at 08:20

## 2020-01-01 RX ADMIN — LEVOTHYROXINE SODIUM 125 MCG: 125 TABLET ORAL at 06:02

## 2020-01-01 RX ADMIN — THEOPHYLLINE ANHYDROUS 200 MG: 200 CAPSULE, EXTENDED RELEASE ORAL at 08:43

## 2020-01-01 RX ADMIN — PANTOPRAZOLE SODIUM 40 MG: 40 TABLET, DELAYED RELEASE ORAL at 06:01

## 2020-01-01 RX ADMIN — SUCRALFATE 1000 MG: 1 SUSPENSION ORAL at 16:18

## 2020-01-01 RX ADMIN — LEVOTHYROXINE SODIUM 125 MCG: 125 TABLET ORAL at 06:00

## 2020-01-01 RX ADMIN — THEOPHYLLINE ANHYDROUS 200 MG: 200 CAPSULE, EXTENDED RELEASE ORAL at 08:38

## 2020-01-01 RX ADMIN — THEOPHYLLINE ANHYDROUS 200 MG: 200 CAPSULE, EXTENDED RELEASE ORAL at 08:54

## 2020-01-01 RX ADMIN — CLOZAPINE 25 MG: 25 TABLET ORAL at 21:25

## 2020-01-01 RX ADMIN — CLOZAPINE 50 MG: 25 TABLET ORAL at 21:29

## 2020-01-01 RX ADMIN — SERTRALINE HYDROCHLORIDE 200 MG: 50 TABLET ORAL at 09:11

## 2020-01-01 RX ADMIN — CLOZAPINE 50 MG: 25 TABLET ORAL at 21:26

## 2020-01-01 RX ADMIN — SUCRALFATE 1000 MG: 1 SUSPENSION ORAL at 06:04

## 2020-01-01 RX ADMIN — CLOZAPINE 25 MG: 25 TABLET ORAL at 22:00

## 2020-01-01 RX ADMIN — CARBAMIDE PEROXIDE 6.5% 5 DROP: 6.5 LIQUID AURICULAR (OTIC) at 21:29

## 2020-01-01 RX ADMIN — THEOPHYLLINE ANHYDROUS 200 MG: 200 CAPSULE, EXTENDED RELEASE ORAL at 09:14

## 2020-01-01 RX ADMIN — SERTRALINE HYDROCHLORIDE 175 MG: 50 TABLET ORAL at 08:20

## 2020-01-01 RX ADMIN — SUCRALFATE 1000 MG: 1 SUSPENSION ORAL at 15:59

## 2020-01-01 RX ADMIN — SERTRALINE HYDROCHLORIDE 175 MG: 50 TABLET ORAL at 08:52

## 2020-01-01 RX ADMIN — CARBAMIDE PEROXIDE 6.5% 5 DROP: 6.5 LIQUID AURICULAR (OTIC) at 08:27

## 2020-01-01 RX ADMIN — SERTRALINE HYDROCHLORIDE 150 MG: 50 TABLET ORAL at 08:43

## 2020-01-01 RX ADMIN — CLOZAPINE 50 MG: 25 TABLET ORAL at 17:00

## 2020-01-01 RX ADMIN — CLOZAPINE 50 MG: 25 TABLET ORAL at 21:07

## 2020-01-01 RX ADMIN — CLOZAPINE 25 MG: 25 TABLET ORAL at 17:11

## 2020-01-01 RX ADMIN — TIOTROPIUM BROMIDE 18 MCG: 18 CAPSULE ORAL; RESPIRATORY (INHALATION) at 08:56

## 2020-01-01 RX ADMIN — CLOZAPINE 50 MG: 25 TABLET ORAL at 17:20

## 2020-01-01 RX ADMIN — TIOTROPIUM BROMIDE 18 MCG: 18 CAPSULE ORAL; RESPIRATORY (INHALATION) at 09:03

## 2020-01-01 RX ADMIN — MONTELUKAST SODIUM 10 MG: 10 TABLET, COATED ORAL at 21:04

## 2020-01-01 RX ADMIN — CLOZAPINE 25 MG: 25 TABLET ORAL at 16:56

## 2020-01-01 RX ADMIN — FLUTICASONE FUROATE AND VILANTEROL TRIFENATATE 1 PUFF: 200; 25 POWDER RESPIRATORY (INHALATION) at 08:53

## 2020-01-01 RX ADMIN — LEVOTHYROXINE SODIUM 125 MCG: 125 TABLET ORAL at 05:54

## 2020-01-01 RX ADMIN — PANTOPRAZOLE SODIUM 40 MG: 40 TABLET, DELAYED RELEASE ORAL at 06:04

## 2020-01-01 RX ADMIN — THEOPHYLLINE ANHYDROUS 200 MG: 200 CAPSULE, EXTENDED RELEASE ORAL at 08:51

## 2020-01-01 RX ADMIN — LEVOTHYROXINE SODIUM 125 MCG: 125 TABLET ORAL at 06:14

## 2020-01-01 RX ADMIN — CLOZAPINE 25 MG: 25 TABLET ORAL at 21:42

## 2020-01-01 RX ADMIN — SUCRALFATE 1000 MG: 1 SUSPENSION ORAL at 06:14

## 2020-01-01 RX ADMIN — SUCRALFATE 1000 MG: 1 SUSPENSION ORAL at 06:06

## 2020-01-01 RX ADMIN — LEVOTHYROXINE SODIUM 125 MCG: 125 TABLET ORAL at 05:53

## 2020-01-01 RX ADMIN — PANTOPRAZOLE SODIUM 40 MG: 40 TABLET, DELAYED RELEASE ORAL at 06:13

## 2020-01-01 RX ADMIN — CLOZAPINE 25 MG: 25 TABLET ORAL at 17:09

## 2020-01-01 RX ADMIN — SERTRALINE HYDROCHLORIDE 175 MG: 50 TABLET ORAL at 08:17

## 2020-01-01 RX ADMIN — THEOPHYLLINE ANHYDROUS 200 MG: 200 CAPSULE, EXTENDED RELEASE ORAL at 08:41

## 2020-01-01 RX ADMIN — BACITRACIN ZINC 1 SMALL APPLICATION: 500 OINTMENT TOPICAL at 18:32

## 2020-01-01 RX ADMIN — CLOZAPINE 50 MG: 25 TABLET ORAL at 16:59

## 2020-01-01 RX ADMIN — CLOZAPINE 25 MG: 25 TABLET ORAL at 16:41

## 2020-01-01 RX ADMIN — CLOZAPINE 50 MG: 25 TABLET ORAL at 21:06

## 2020-01-01 RX ADMIN — TIOTROPIUM BROMIDE 18 MCG: 18 CAPSULE ORAL; RESPIRATORY (INHALATION) at 08:39

## 2020-01-01 RX ADMIN — SERTRALINE HYDROCHLORIDE 175 MG: 50 TABLET ORAL at 08:10

## 2020-01-01 RX ADMIN — SERTRALINE HYDROCHLORIDE 175 MG: 50 TABLET ORAL at 08:54

## 2020-01-01 RX ADMIN — SUCRALFATE 1000 MG: 1 SUSPENSION ORAL at 16:32

## 2020-01-01 RX ADMIN — SERTRALINE HYDROCHLORIDE 175 MG: 50 TABLET ORAL at 08:38

## 2020-01-01 RX ADMIN — LEVOTHYROXINE SODIUM 125 MCG: 125 TABLET ORAL at 06:04

## 2020-01-01 RX ADMIN — CLOZAPINE 50 MG: 25 TABLET ORAL at 20:41

## 2020-01-01 RX ADMIN — SUCRALFATE 1000 MG: 1 SUSPENSION ORAL at 15:52

## 2020-01-01 RX ADMIN — SUCRALFATE 1000 MG: 1 SUSPENSION ORAL at 06:12

## 2020-01-01 RX ADMIN — CLOZAPINE 50 MG: 25 TABLET ORAL at 17:08

## 2020-01-01 RX ADMIN — FLUTICASONE FUROATE AND VILANTEROL TRIFENATATE 1 PUFF: 200; 25 POWDER RESPIRATORY (INHALATION) at 09:12

## 2020-01-01 RX ADMIN — SUCRALFATE 1000 MG: 1 SUSPENSION ORAL at 06:15

## 2020-01-01 RX ADMIN — CLOZAPINE 50 MG: 25 TABLET ORAL at 21:41

## 2020-01-01 RX ADMIN — TIOTROPIUM BROMIDE 18 MCG: 18 CAPSULE ORAL; RESPIRATORY (INHALATION) at 09:14

## 2020-01-01 RX ADMIN — TIOTROPIUM BROMIDE 18 MCG: 18 CAPSULE ORAL; RESPIRATORY (INHALATION) at 08:06

## 2020-01-01 RX ADMIN — CLOZAPINE 50 MG: 25 TABLET ORAL at 21:27

## 2020-01-01 RX ADMIN — THEOPHYLLINE ANHYDROUS 200 MG: 200 CAPSULE, EXTENDED RELEASE ORAL at 08:46

## 2020-01-01 RX ADMIN — CLOZAPINE 50 MG: 25 TABLET ORAL at 17:10

## 2020-01-01 RX ADMIN — CLOZAPINE 25 MG: 25 TABLET ORAL at 16:58

## 2020-01-01 RX ADMIN — SERTRALINE HYDROCHLORIDE 175 MG: 50 TABLET ORAL at 08:26

## 2020-01-01 RX ADMIN — SERTRALINE HYDROCHLORIDE 175 MG: 50 TABLET ORAL at 08:37

## 2020-01-01 RX ADMIN — LEVOTHYROXINE SODIUM 125 MCG: 125 TABLET ORAL at 06:01

## 2020-01-01 RX ADMIN — THEOPHYLLINE ANHYDROUS 200 MG: 200 CAPSULE, EXTENDED RELEASE ORAL at 09:16

## 2020-01-01 RX ADMIN — SUCRALFATE 1000 MG: 1 SUSPENSION ORAL at 06:02

## 2020-01-01 RX ADMIN — SERTRALINE HYDROCHLORIDE 175 MG: 50 TABLET ORAL at 08:57

## 2020-01-01 RX ADMIN — SERTRALINE HYDROCHLORIDE 175 MG: 50 TABLET ORAL at 09:05

## 2020-01-01 RX ADMIN — SUCRALFATE 1000 MG: 1 SUSPENSION ORAL at 16:45

## 2020-01-01 RX ADMIN — FLUTICASONE FUROATE AND VILANTEROL TRIFENATATE 1 PUFF: 200; 25 POWDER RESPIRATORY (INHALATION) at 08:42

## 2020-01-01 RX ADMIN — SUCRALFATE 1000 MG: 1 SUSPENSION ORAL at 06:08

## 2020-01-01 RX ADMIN — SERTRALINE HYDROCHLORIDE 200 MG: 50 TABLET ORAL at 08:46

## 2020-01-01 RX ADMIN — THEOPHYLLINE ANHYDROUS 200 MG: 200 CAPSULE, EXTENDED RELEASE ORAL at 08:13

## 2020-01-01 RX ADMIN — THEOPHYLLINE ANHYDROUS 200 MG: 200 CAPSULE, EXTENDED RELEASE ORAL at 08:20

## 2020-01-01 RX ADMIN — BACITRACIN ZINC 1 SMALL APPLICATION: 500 OINTMENT TOPICAL at 17:30

## 2020-01-01 RX ADMIN — SERTRALINE HYDROCHLORIDE 150 MG: 50 TABLET ORAL at 08:54

## 2020-01-01 RX ADMIN — THEOPHYLLINE ANHYDROUS 200 MG: 200 CAPSULE, EXTENDED RELEASE ORAL at 09:09

## 2020-01-01 RX ADMIN — SERTRALINE HYDROCHLORIDE 50 MG: 50 TABLET ORAL at 21:28

## 2020-01-01 RX ADMIN — TIOTROPIUM BROMIDE 18 MCG: 18 CAPSULE ORAL; RESPIRATORY (INHALATION) at 08:57

## 2020-01-01 RX ADMIN — CLOZAPINE 25 MG: 25 TABLET ORAL at 21:29

## 2020-01-01 RX ADMIN — CLOZAPINE 25 MG: 25 TABLET ORAL at 17:17

## 2020-01-01 RX ADMIN — THEOPHYLLINE ANHYDROUS 200 MG: 200 CAPSULE, EXTENDED RELEASE ORAL at 08:02

## 2020-01-01 RX ADMIN — CLOZAPINE 25 MG: 25 TABLET ORAL at 21:28

## 2020-01-01 RX ADMIN — SUCRALFATE 1000 MG: 1 SUSPENSION ORAL at 06:05

## 2020-01-01 RX ADMIN — LEVOTHYROXINE SODIUM 125 MCG: 125 TABLET ORAL at 06:05

## 2020-01-01 RX ADMIN — SUCRALFATE 1000 MG: 1 SUSPENSION ORAL at 16:06

## 2020-01-01 RX ADMIN — PANTOPRAZOLE SODIUM 40 MG: 40 TABLET, DELAYED RELEASE ORAL at 06:27

## 2020-01-01 RX ADMIN — SUCRALFATE 1000 MG: 1 SUSPENSION ORAL at 06:00

## 2020-01-01 RX ADMIN — SUCRALFATE 1000 MG: 1 SUSPENSION ORAL at 16:19

## 2020-01-01 RX ADMIN — CLOZAPINE 50 MG: 25 TABLET ORAL at 21:32

## 2020-01-01 RX ADMIN — FLUTICASONE FUROATE AND VILANTEROL TRIFENATATE 1 PUFF: 200; 25 POWDER RESPIRATORY (INHALATION) at 09:17

## 2020-01-01 RX ADMIN — PANTOPRAZOLE SODIUM 40 MG: 40 TABLET, DELAYED RELEASE ORAL at 06:00

## 2020-01-01 RX ADMIN — CLOZAPINE 50 MG: 25 TABLET ORAL at 21:33

## 2020-01-01 RX ADMIN — THEOPHYLLINE ANHYDROUS 200 MG: 200 CAPSULE, EXTENDED RELEASE ORAL at 09:08

## 2020-01-01 RX ADMIN — CLOZAPINE 50 MG: 25 TABLET ORAL at 20:44

## 2020-01-01 RX ADMIN — PANTOPRAZOLE SODIUM 40 MG: 40 TABLET, DELAYED RELEASE ORAL at 06:07

## 2020-01-01 RX ADMIN — SUCRALFATE 1000 MG: 1 SUSPENSION ORAL at 15:57

## 2020-01-01 RX ADMIN — CLOZAPINE 25 MG: 25 TABLET ORAL at 21:26

## 2020-01-01 RX ADMIN — FLUTICASONE FUROATE AND VILANTEROL TRIFENATATE 1 PUFF: 200; 25 POWDER RESPIRATORY (INHALATION) at 09:08

## 2020-01-01 RX ADMIN — CLOZAPINE 50 MG: 25 TABLET ORAL at 17:09

## 2020-01-01 RX ADMIN — CLOZAPINE 50 MG: 25 TABLET ORAL at 17:48

## 2020-01-01 RX ADMIN — THEOPHYLLINE ANHYDROUS 200 MG: 200 CAPSULE, EXTENDED RELEASE ORAL at 08:17

## 2020-01-01 RX ADMIN — CLOZAPINE 25 MG: 25 TABLET ORAL at 21:38

## 2020-01-01 RX ADMIN — SERTRALINE HYDROCHLORIDE 175 MG: 50 TABLET ORAL at 09:13

## 2020-01-01 RX ADMIN — SUCRALFATE 1000 MG: 1 SUSPENSION ORAL at 06:19

## 2020-01-01 RX ADMIN — CARBAMIDE PEROXIDE 6.5% 5 DROP: 6.5 LIQUID AURICULAR (OTIC) at 21:34

## 2020-01-01 RX ADMIN — FLUTICASONE FUROATE AND VILANTEROL TRIFENATATE 1 PUFF: 200; 25 POWDER RESPIRATORY (INHALATION) at 08:52

## 2020-01-01 RX ADMIN — FLUTICASONE FUROATE AND VILANTEROL TRIFENATATE 1 PUFF: 200; 25 POWDER RESPIRATORY (INHALATION) at 08:15

## 2020-01-01 RX ADMIN — SUCRALFATE 1000 MG: 1 SUSPENSION ORAL at 06:01

## 2020-01-01 RX ADMIN — THEOPHYLLINE ANHYDROUS 200 MG: 200 CAPSULE, EXTENDED RELEASE ORAL at 08:09

## 2020-01-01 RX ADMIN — LEVOTHYROXINE SODIUM 125 MCG: 125 TABLET ORAL at 06:03

## 2020-01-01 RX ADMIN — FLUTICASONE FUROATE AND VILANTEROL TRIFENATATE 1 PUFF: 200; 25 POWDER RESPIRATORY (INHALATION) at 08:21

## 2020-01-01 RX ADMIN — CLOZAPINE 50 MG: 25 TABLET ORAL at 20:52

## 2020-01-01 RX ADMIN — TIOTROPIUM BROMIDE 18 MCG: 18 CAPSULE ORAL; RESPIRATORY (INHALATION) at 08:38

## 2020-01-01 RX ADMIN — CLOZAPINE 25 MG: 25 TABLET ORAL at 20:59

## 2020-01-01 RX ADMIN — CLOZAPINE 25 MG: 25 TABLET ORAL at 21:30

## 2020-01-01 RX ADMIN — LEVOTHYROXINE SODIUM 125 MCG: 125 TABLET ORAL at 06:13

## 2020-01-01 RX ADMIN — CLOZAPINE 50 MG: 25 TABLET ORAL at 20:46

## 2020-01-01 RX ADMIN — CLOZAPINE 50 MG: 25 TABLET ORAL at 16:40

## 2020-01-01 RX ADMIN — SUCRALFATE 1000 MG: 1 SUSPENSION ORAL at 06:07

## 2020-01-01 RX ADMIN — CLOZAPINE 25 MG: 25 TABLET ORAL at 17:22

## 2020-01-01 RX ADMIN — CLOZAPINE 50 MG: 25 TABLET ORAL at 21:25

## 2020-01-01 RX ADMIN — FLUTICASONE FUROATE AND VILANTEROL TRIFENATATE 1 PUFF: 200; 25 POWDER RESPIRATORY (INHALATION) at 08:02

## 2020-01-01 RX ADMIN — THEOPHYLLINE ANHYDROUS 200 MG: 200 CAPSULE, EXTENDED RELEASE ORAL at 08:58

## 2020-01-01 RX ADMIN — CLOZAPINE 50 MG: 25 TABLET ORAL at 17:37

## 2020-01-01 RX ADMIN — PANTOPRAZOLE SODIUM 40 MG: 40 TABLET, DELAYED RELEASE ORAL at 06:18

## 2020-01-01 RX ADMIN — THEOPHYLLINE ANHYDROUS 200 MG: 200 CAPSULE, EXTENDED RELEASE ORAL at 09:07

## 2020-01-01 RX ADMIN — SUCRALFATE 1000 MG: 1 SUSPENSION ORAL at 16:36

## 2020-01-01 RX ADMIN — CLOZAPINE 50 MG: 25 TABLET ORAL at 17:04

## 2020-01-01 RX ADMIN — CLOZAPINE 25 MG: 25 TABLET ORAL at 17:00

## 2020-01-01 RX ADMIN — CLOZAPINE 50 MG: 25 TABLET ORAL at 16:41

## 2020-01-01 RX ADMIN — SERTRALINE HYDROCHLORIDE 175 MG: 50 TABLET ORAL at 08:32

## 2020-01-01 RX ADMIN — CLOZAPINE 50 MG: 25 TABLET ORAL at 17:03

## 2020-01-01 RX ADMIN — SERTRALINE HYDROCHLORIDE 150 MG: 50 TABLET ORAL at 08:31

## 2020-01-01 RX ADMIN — CLOZAPINE 50 MG: 25 TABLET ORAL at 21:12

## 2020-01-01 RX ADMIN — SUCRALFATE 1000 MG: 1 SUSPENSION ORAL at 16:04

## 2020-01-01 RX ADMIN — SUCRALFATE 1000 MG: 1 SUSPENSION ORAL at 16:17

## 2020-01-01 RX ADMIN — ALUMINUM HYDROXIDE, MAGNESIUM HYDROXIDE, AND SIMETHICONE 15 ML: 200; 200; 20 SUSPENSION ORAL at 10:21

## 2020-01-01 RX ADMIN — TIOTROPIUM BROMIDE 18 MCG: 18 CAPSULE ORAL; RESPIRATORY (INHALATION) at 08:09

## 2020-01-01 RX ADMIN — TIOTROPIUM BROMIDE 18 MCG: 18 CAPSULE ORAL; RESPIRATORY (INHALATION) at 08:29

## 2020-01-01 RX ADMIN — CLOZAPINE 25 MG: 25 TABLET ORAL at 16:40

## 2020-01-01 RX ADMIN — CLOZAPINE 25 MG: 25 TABLET ORAL at 21:10

## 2020-01-01 RX ADMIN — THEOPHYLLINE ANHYDROUS 200 MG: 200 CAPSULE, EXTENDED RELEASE ORAL at 08:24

## 2020-01-01 RX ADMIN — CLOZAPINE 50 MG: 25 TABLET ORAL at 21:05

## 2020-01-01 RX ADMIN — SUCRALFATE 1000 MG: 1 SUSPENSION ORAL at 16:01

## 2020-01-01 RX ADMIN — LEVOTHYROXINE SODIUM 125 MCG: 125 TABLET ORAL at 06:06

## 2020-01-01 RX ADMIN — FLUTICASONE FUROATE AND VILANTEROL TRIFENATATE 1 PUFF: 200; 25 POWDER RESPIRATORY (INHALATION) at 08:38

## 2020-01-01 RX ADMIN — SUCRALFATE 1000 MG: 1 SUSPENSION ORAL at 06:13

## 2020-01-01 RX ADMIN — CLOZAPINE 50 MG: 25 TABLET ORAL at 17:11

## 2020-01-01 RX ADMIN — SERTRALINE HYDROCHLORIDE 150 MG: 50 TABLET ORAL at 08:34

## 2020-01-01 RX ADMIN — SUCRALFATE 1000 MG: 1 SUSPENSION ORAL at 06:10

## 2020-01-01 RX ADMIN — SUCRALFATE 1000 MG: 1 SUSPENSION ORAL at 16:16

## 2020-01-01 RX ADMIN — TIOTROPIUM BROMIDE 18 MCG: 18 CAPSULE ORAL; RESPIRATORY (INHALATION) at 08:58

## 2020-01-01 RX ADMIN — CLOZAPINE 25 MG: 25 TABLET ORAL at 21:24

## 2020-01-01 RX ADMIN — SUCRALFATE 1000 MG: 1 SUSPENSION ORAL at 06:28

## 2020-01-01 RX ADMIN — CLOZAPINE 50 MG: 25 TABLET ORAL at 16:34

## 2020-01-01 RX ADMIN — CLOZAPINE 25 MG: 25 TABLET ORAL at 17:14

## 2020-01-01 RX ADMIN — LEVOTHYROXINE SODIUM 125 MCG: 125 TABLET ORAL at 06:09

## 2020-01-01 RX ADMIN — SUCRALFATE 1000 MG: 1 SUSPENSION ORAL at 16:05

## 2020-01-01 RX ADMIN — SERTRALINE HYDROCHLORIDE 175 MG: 50 TABLET ORAL at 08:35

## 2020-01-01 RX ADMIN — CLOZAPINE 25 MG: 25 TABLET ORAL at 17:25

## 2020-01-01 RX ADMIN — CLOZAPINE 25 MG: 25 TABLET ORAL at 21:18

## 2020-01-01 RX ADMIN — LEVOTHYROXINE SODIUM 125 MCG: 125 TABLET ORAL at 06:10

## 2020-01-01 RX ADMIN — FLUTICASONE FUROATE AND VILANTEROL TRIFENATATE 1 PUFF: 200; 25 POWDER RESPIRATORY (INHALATION) at 08:54

## 2020-01-01 RX ADMIN — FLUTICASONE FUROATE AND VILANTEROL TRIFENATATE 1 PUFF: 200; 25 POWDER RESPIRATORY (INHALATION) at 08:07

## 2020-01-01 RX ADMIN — THEOPHYLLINE ANHYDROUS 200 MG: 200 CAPSULE, EXTENDED RELEASE ORAL at 08:15

## 2020-01-01 RX ADMIN — CLOZAPINE 25 MG: 25 TABLET ORAL at 21:06

## 2020-01-01 RX ADMIN — THEOPHYLLINE ANHYDROUS 200 MG: 200 CAPSULE, EXTENDED RELEASE ORAL at 08:07

## 2020-01-01 RX ADMIN — CLOZAPINE 50 MG: 25 TABLET ORAL at 17:53

## 2020-01-01 RX ADMIN — THEOPHYLLINE ANHYDROUS 200 MG: 200 CAPSULE, EXTENDED RELEASE ORAL at 08:59

## 2020-01-01 RX ADMIN — SUCRALFATE 1000 MG: 1 SUSPENSION ORAL at 06:26

## 2020-01-01 RX ADMIN — TIOTROPIUM BROMIDE 18 MCG: 18 CAPSULE ORAL; RESPIRATORY (INHALATION) at 08:30

## 2020-01-01 RX ADMIN — CARBAMIDE PEROXIDE 6.5% 5 DROP: 6.5 LIQUID AURICULAR (OTIC) at 08:47

## 2020-01-01 RX ADMIN — CLOZAPINE 50 MG: 25 TABLET ORAL at 20:50

## 2020-01-01 RX ADMIN — SUCRALFATE 1000 MG: 1 SUSPENSION ORAL at 16:08

## 2020-01-01 RX ADMIN — CLOZAPINE 50 MG: 25 TABLET ORAL at 20:53

## 2020-01-01 RX ADMIN — FLUTICASONE FUROATE AND VILANTEROL TRIFENATATE 1 PUFF: 200; 25 POWDER RESPIRATORY (INHALATION) at 08:29

## 2020-01-01 RX ADMIN — CLOZAPINE 25 MG: 25 TABLET ORAL at 17:19

## 2020-01-01 RX ADMIN — SUCRALFATE 1000 MG: 1 SUSPENSION ORAL at 17:00

## 2020-01-01 RX ADMIN — CLOZAPINE 50 MG: 25 TABLET ORAL at 17:22

## 2020-01-01 RX ADMIN — CLOZAPINE 50 MG: 25 TABLET ORAL at 20:54

## 2020-01-01 RX ADMIN — CLOZAPINE 25 MG: 25 TABLET ORAL at 17:02

## 2020-01-01 RX ADMIN — PANTOPRAZOLE SODIUM 40 MG: 40 TABLET, DELAYED RELEASE ORAL at 06:06

## 2020-01-01 RX ADMIN — CLOZAPINE 25 MG: 25 TABLET ORAL at 20:56

## 2020-01-01 RX ADMIN — CLOZAPINE 50 MG: 25 TABLET ORAL at 17:42

## 2020-01-01 RX ADMIN — SUCRALFATE 1000 MG: 1 SUSPENSION ORAL at 16:09

## 2020-01-01 RX ADMIN — SUCRALFATE 1000 MG: 1 SUSPENSION ORAL at 15:55

## 2020-01-01 RX ADMIN — LEVOTHYROXINE SODIUM 125 MCG: 125 TABLET ORAL at 05:49

## 2020-01-01 RX ADMIN — CARBAMIDE PEROXIDE 6.5% 5 DROP: 6.5 LIQUID AURICULAR (OTIC) at 08:14

## 2020-01-01 RX ADMIN — SUCRALFATE 1000 MG: 1 SUSPENSION ORAL at 16:31

## 2020-01-01 RX ADMIN — PANTOPRAZOLE SODIUM 40 MG: 40 TABLET, DELAYED RELEASE ORAL at 06:03

## 2020-01-01 RX ADMIN — TIOTROPIUM BROMIDE 18 MCG: 18 CAPSULE ORAL; RESPIRATORY (INHALATION) at 08:52

## 2020-01-01 RX ADMIN — SERTRALINE HYDROCHLORIDE 175 MG: 50 TABLET ORAL at 08:58

## 2020-01-01 RX ADMIN — PANTOPRAZOLE SODIUM 40 MG: 40 TABLET, DELAYED RELEASE ORAL at 06:09

## 2020-01-01 RX ADMIN — CLOZAPINE 50 MG: 25 TABLET ORAL at 21:08

## 2020-01-01 RX ADMIN — CARBAMIDE PEROXIDE 6.5% 5 DROP: 6.5 LIQUID AURICULAR (OTIC) at 21:28

## 2020-01-01 RX ADMIN — CLOZAPINE 50 MG: 25 TABLET ORAL at 17:14

## 2020-01-01 RX ADMIN — FLUTICASONE FUROATE AND VILANTEROL TRIFENATATE 1 PUFF: 200; 25 POWDER RESPIRATORY (INHALATION) at 08:44

## 2020-01-01 RX ADMIN — SUCRALFATE 1000 MG: 1 SUSPENSION ORAL at 16:34

## 2020-01-01 RX ADMIN — SERTRALINE HYDROCHLORIDE 200 MG: 50 TABLET ORAL at 08:39

## 2020-01-01 RX ADMIN — SERTRALINE HYDROCHLORIDE 200 MG: 50 TABLET ORAL at 09:02

## 2020-01-01 RX ADMIN — FLUTICASONE FUROATE AND VILANTEROL TRIFENATATE 1 PUFF: 200; 25 POWDER RESPIRATORY (INHALATION) at 08:20

## 2020-01-01 RX ADMIN — TIOTROPIUM BROMIDE 18 MCG: 18 CAPSULE ORAL; RESPIRATORY (INHALATION) at 09:11

## 2020-01-01 RX ADMIN — PANTOPRAZOLE SODIUM 40 MG: 40 TABLET, DELAYED RELEASE ORAL at 05:49

## 2020-01-01 RX ADMIN — THEOPHYLLINE ANHYDROUS 200 MG: 200 CAPSULE, EXTENDED RELEASE ORAL at 08:26

## 2020-01-01 RX ADMIN — CLOZAPINE 50 MG: 25 TABLET ORAL at 21:15

## 2020-01-01 RX ADMIN — SUCRALFATE 1000 MG: 1 SUSPENSION ORAL at 06:09

## 2020-01-01 RX ADMIN — TIOTROPIUM BROMIDE 18 MCG: 18 CAPSULE ORAL; RESPIRATORY (INHALATION) at 08:36

## 2020-01-01 RX ADMIN — FLUTICASONE FUROATE AND VILANTEROL TRIFENATATE 1 PUFF: 200; 25 POWDER RESPIRATORY (INHALATION) at 08:32

## 2020-01-01 RX ADMIN — CLOZAPINE 25 MG: 25 TABLET ORAL at 21:43

## 2020-01-01 RX ADMIN — THEOPHYLLINE ANHYDROUS 200 MG: 200 CAPSULE, EXTENDED RELEASE ORAL at 08:53

## 2020-01-01 RX ADMIN — CLOZAPINE 50 MG: 25 TABLET ORAL at 16:54

## 2020-01-01 RX ADMIN — CLOZAPINE 25 MG: 25 TABLET ORAL at 21:41

## 2020-01-01 RX ADMIN — PANTOPRAZOLE SODIUM 40 MG: 40 TABLET, DELAYED RELEASE ORAL at 06:12

## 2020-01-01 RX ADMIN — SUCRALFATE 1000 MG: 1 SUSPENSION ORAL at 16:25

## 2020-01-01 RX ADMIN — THEOPHYLLINE ANHYDROUS 200 MG: 200 CAPSULE, EXTENDED RELEASE ORAL at 08:56

## 2020-01-01 RX ADMIN — CLOZAPINE 50 MG: 25 TABLET ORAL at 21:30

## 2020-01-01 RX ADMIN — SUCRALFATE 1000 MG: 1 SUSPENSION ORAL at 16:03

## 2020-01-01 RX ADMIN — TIOTROPIUM BROMIDE 18 MCG: 18 CAPSULE ORAL; RESPIRATORY (INHALATION) at 08:49

## 2020-01-01 RX ADMIN — THEOPHYLLINE ANHYDROUS 200 MG: 200 CAPSULE, EXTENDED RELEASE ORAL at 08:21

## 2020-01-01 RX ADMIN — FLUTICASONE FUROATE AND VILANTEROL TRIFENATATE 1 PUFF: 200; 25 POWDER RESPIRATORY (INHALATION) at 08:31

## 2020-01-01 RX ADMIN — CLOZAPINE 50 MG: 25 TABLET ORAL at 21:23

## 2020-01-01 RX ADMIN — CLOZAPINE 25 MG: 25 TABLET ORAL at 21:16

## 2020-01-01 RX ADMIN — CLOZAPINE 25 MG: 25 TABLET ORAL at 16:55

## 2020-01-01 RX ADMIN — CLOZAPINE 25 MG: 25 TABLET ORAL at 16:37

## 2020-01-01 RX ADMIN — CLOZAPINE 25 MG: 25 TABLET ORAL at 17:48

## 2020-01-01 RX ADMIN — SUCRALFATE 1000 MG: 1 SUSPENSION ORAL at 16:21

## 2020-01-01 RX ADMIN — TIOTROPIUM BROMIDE 18 MCG: 18 CAPSULE ORAL; RESPIRATORY (INHALATION) at 09:16

## 2020-01-01 RX ADMIN — LEVOTHYROXINE SODIUM 125 MCG: 125 TABLET ORAL at 05:57

## 2020-01-01 RX ADMIN — CLOZAPINE 50 MG: 25 TABLET ORAL at 21:01

## 2020-01-01 RX ADMIN — THEOPHYLLINE ANHYDROUS 200 MG: 200 CAPSULE, EXTENDED RELEASE ORAL at 09:44

## 2020-01-01 RX ADMIN — CLOZAPINE 25 MG: 25 TABLET ORAL at 17:42

## 2020-01-01 RX ADMIN — CLOZAPINE 25 MG: 25 TABLET ORAL at 17:07

## 2020-01-01 RX ADMIN — THEOPHYLLINE ANHYDROUS 200 MG: 200 CAPSULE, EXTENDED RELEASE ORAL at 08:34

## 2020-01-01 RX ADMIN — SUCRALFATE 1000 MG: 1 SUSPENSION ORAL at 05:58

## 2020-01-01 RX ADMIN — SERTRALINE HYDROCHLORIDE 150 MG: 50 TABLET ORAL at 08:38

## 2020-01-01 RX ADMIN — TIOTROPIUM BROMIDE 18 MCG: 18 CAPSULE ORAL; RESPIRATORY (INHALATION) at 08:11

## 2020-01-01 RX ADMIN — CLOZAPINE 25 MG: 25 TABLET ORAL at 21:15

## 2020-01-01 RX ADMIN — CLOZAPINE 50 MG: 25 TABLET ORAL at 20:27

## 2020-01-01 RX ADMIN — CLOZAPINE 50 MG: 25 TABLET ORAL at 21:28

## 2020-01-01 RX ADMIN — CARBAMIDE PEROXIDE 6.5% 5 DROP: 6.5 LIQUID AURICULAR (OTIC) at 21:06

## 2020-01-01 RX ADMIN — THEOPHYLLINE ANHYDROUS 200 MG: 200 CAPSULE, EXTENDED RELEASE ORAL at 08:32

## 2020-01-01 RX ADMIN — CLOZAPINE 50 MG: 25 TABLET ORAL at 17:54

## 2020-01-01 RX ADMIN — CLOZAPINE 25 MG: 25 TABLET ORAL at 17:36

## 2020-01-01 RX ADMIN — TIOTROPIUM BROMIDE 18 MCG: 18 CAPSULE ORAL; RESPIRATORY (INHALATION) at 08:22

## 2020-01-01 RX ADMIN — CLOZAPINE 25 MG: 25 TABLET ORAL at 17:31

## 2020-01-01 RX ADMIN — SERTRALINE HYDROCHLORIDE 150 MG: 50 TABLET ORAL at 08:16

## 2020-01-01 RX ADMIN — THEOPHYLLINE ANHYDROUS 200 MG: 200 CAPSULE, EXTENDED RELEASE ORAL at 08:44

## 2020-01-01 RX ADMIN — SERTRALINE HYDROCHLORIDE 175 MG: 50 TABLET ORAL at 08:07

## 2020-01-01 RX ADMIN — THEOPHYLLINE ANHYDROUS 200 MG: 200 CAPSULE, EXTENDED RELEASE ORAL at 08:11

## 2020-01-01 RX ADMIN — LEVOTHYROXINE SODIUM 125 MCG: 125 TABLET ORAL at 06:11

## 2020-01-01 RX ADMIN — TIOTROPIUM BROMIDE 18 MCG: 18 CAPSULE ORAL; RESPIRATORY (INHALATION) at 09:09

## 2020-01-01 RX ADMIN — TIOTROPIUM BROMIDE 18 MCG: 18 CAPSULE ORAL; RESPIRATORY (INHALATION) at 08:43

## 2020-01-01 RX ADMIN — CLOZAPINE 50 MG: 25 TABLET ORAL at 17:06

## 2020-01-01 RX ADMIN — SUCRALFATE 1000 MG: 1 SUSPENSION ORAL at 16:11

## 2020-01-01 RX ADMIN — CARBAMIDE PEROXIDE 6.5% 5 DROP: 6.5 LIQUID AURICULAR (OTIC) at 08:43

## 2020-01-01 RX ADMIN — CLOZAPINE 25 MG: 25 TABLET ORAL at 21:03

## 2020-01-01 RX ADMIN — CLOZAPINE 50 MG: 25 TABLET ORAL at 16:55

## 2020-01-01 RX ADMIN — CLOZAPINE 50 MG: 25 TABLET ORAL at 17:41

## 2020-01-01 RX ADMIN — CLOZAPINE 25 MG: 25 TABLET ORAL at 17:55

## 2020-01-01 RX ADMIN — FLUTICASONE FUROATE AND VILANTEROL TRIFENATATE 1 PUFF: 200; 25 POWDER RESPIRATORY (INHALATION) at 09:44

## 2020-01-01 RX ADMIN — CLOZAPINE 50 MG: 25 TABLET ORAL at 17:38

## 2020-01-01 RX ADMIN — THEOPHYLLINE ANHYDROUS 200 MG: 200 CAPSULE, EXTENDED RELEASE ORAL at 09:05

## 2020-01-01 RX ADMIN — SERTRALINE HYDROCHLORIDE 50 MG: 50 TABLET ORAL at 21:43

## 2020-01-01 RX ADMIN — CLOZAPINE 50 MG: 25 TABLET ORAL at 21:00

## 2020-01-01 RX ADMIN — THEOPHYLLINE ANHYDROUS 200 MG: 200 CAPSULE, EXTENDED RELEASE ORAL at 09:01

## 2020-01-01 RX ADMIN — CLOZAPINE 50 MG: 25 TABLET ORAL at 16:44

## 2020-01-01 RX ADMIN — SUCRALFATE 1000 MG: 1 SUSPENSION ORAL at 16:13

## 2020-01-01 RX ADMIN — FLUTICASONE FUROATE AND VILANTEROL TRIFENATATE 1 PUFF: 200; 25 POWDER RESPIRATORY (INHALATION) at 08:35

## 2020-01-01 RX ADMIN — THEOPHYLLINE ANHYDROUS 200 MG: 200 CAPSULE, EXTENDED RELEASE ORAL at 08:22

## 2020-01-01 RX ADMIN — SUCRALFATE 1000 MG: 1 SUSPENSION ORAL at 07:00

## 2020-01-01 RX ADMIN — CLOZAPINE 50 MG: 25 TABLET ORAL at 21:35

## 2020-01-01 RX ADMIN — CLOZAPINE 25 MG: 25 TABLET ORAL at 17:33

## 2020-01-01 RX ADMIN — THEOPHYLLINE ANHYDROUS 200 MG: 200 CAPSULE, EXTENDED RELEASE ORAL at 08:48

## 2020-01-01 RX ADMIN — SERTRALINE HYDROCHLORIDE 175 MG: 50 TABLET ORAL at 08:49

## 2020-01-01 RX ADMIN — LEVOTHYROXINE SODIUM 125 MCG: 125 TABLET ORAL at 06:08

## 2020-01-01 RX ADMIN — SUCRALFATE 1000 MG: 1 SUSPENSION ORAL at 16:30

## 2020-01-01 RX ADMIN — THEOPHYLLINE ANHYDROUS 200 MG: 200 CAPSULE, EXTENDED RELEASE ORAL at 08:29

## 2020-01-01 RX ADMIN — SERTRALINE HYDROCHLORIDE 150 MG: 50 TABLET ORAL at 08:36

## 2020-01-01 RX ADMIN — CLOZAPINE 50 MG: 25 TABLET ORAL at 21:18

## 2020-01-01 RX ADMIN — SUCRALFATE 1000 MG: 1 SUSPENSION ORAL at 16:29

## 2020-01-01 RX ADMIN — THEOPHYLLINE ANHYDROUS 200 MG: 200 CAPSULE, EXTENDED RELEASE ORAL at 08:16

## 2020-01-01 RX ADMIN — FLUTICASONE FUROATE AND VILANTEROL TRIFENATATE 1 PUFF: 200; 25 POWDER RESPIRATORY (INHALATION) at 09:01

## 2020-01-01 RX ADMIN — TIOTROPIUM BROMIDE 18 MCG: 18 CAPSULE ORAL; RESPIRATORY (INHALATION) at 08:54

## 2020-01-01 RX ADMIN — THEOPHYLLINE ANHYDROUS 200 MG: 200 CAPSULE, EXTENDED RELEASE ORAL at 08:03

## 2020-01-01 RX ADMIN — CLOZAPINE 25 MG: 25 TABLET ORAL at 17:52

## 2020-01-01 RX ADMIN — FLUTICASONE FUROATE AND VILANTEROL TRIFENATATE 1 PUFF: 200; 25 POWDER RESPIRATORY (INHALATION) at 08:13

## 2020-01-01 RX ADMIN — CLOZAPINE 50 MG: 25 TABLET ORAL at 22:13

## 2020-01-01 RX ADMIN — TIOTROPIUM BROMIDE 18 MCG: 18 CAPSULE ORAL; RESPIRATORY (INHALATION) at 08:31

## 2020-01-01 RX ADMIN — FLUTICASONE FUROATE AND VILANTEROL TRIFENATATE 1 PUFF: 200; 25 POWDER RESPIRATORY (INHALATION) at 09:13

## 2020-01-01 RX ADMIN — CLOZAPINE 50 MG: 25 TABLET ORAL at 21:37

## 2020-01-01 RX ADMIN — CLOZAPINE 50 MG: 25 TABLET ORAL at 17:39

## 2020-01-01 RX ADMIN — FLUTICASONE FUROATE AND VILANTEROL TRIFENATATE 1 PUFF: 200; 25 POWDER RESPIRATORY (INHALATION) at 08:14

## 2020-01-01 RX ADMIN — SERTRALINE HYDROCHLORIDE 175 MG: 50 TABLET ORAL at 08:48

## 2020-01-01 RX ADMIN — THEOPHYLLINE ANHYDROUS 200 MG: 200 CAPSULE, EXTENDED RELEASE ORAL at 08:23

## 2020-01-01 RX ADMIN — LEVOTHYROXINE SODIUM 125 MCG: 125 TABLET ORAL at 06:07

## 2020-01-01 RX ADMIN — CLOZAPINE 25 MG: 25 TABLET ORAL at 21:19

## 2020-01-01 RX ADMIN — CLOZAPINE 50 MG: 25 TABLET ORAL at 21:16

## 2020-01-01 RX ADMIN — SUCRALFATE 1000 MG: 1 SUSPENSION ORAL at 16:50

## 2020-01-01 RX ADMIN — SUCRALFATE 1000 MG: 1 SUSPENSION ORAL at 06:17

## 2020-01-01 RX ADMIN — SERTRALINE HYDROCHLORIDE 200 MG: 50 TABLET ORAL at 08:58

## 2020-01-01 RX ADMIN — CLOZAPINE 50 MG: 25 TABLET ORAL at 20:45

## 2020-01-01 RX ADMIN — FLUTICASONE FUROATE AND VILANTEROL TRIFENATATE 1 PUFF: 200; 25 POWDER RESPIRATORY (INHALATION) at 09:11

## 2020-01-01 RX ADMIN — TIOTROPIUM BROMIDE 18 MCG: 18 CAPSULE ORAL; RESPIRATORY (INHALATION) at 08:37

## 2020-01-01 RX ADMIN — FLUTICASONE FUROATE AND VILANTEROL TRIFENATATE 1 PUFF: 200; 25 POWDER RESPIRATORY (INHALATION) at 08:58

## 2020-01-01 RX ADMIN — CLOZAPINE 25 MG: 25 TABLET ORAL at 22:13

## 2020-01-01 RX ADMIN — FLUTICASONE FUROATE AND VILANTEROL TRIFENATATE 1 PUFF: 200; 25 POWDER RESPIRATORY (INHALATION) at 08:34

## 2020-01-01 RX ADMIN — SERTRALINE HYDROCHLORIDE 200 MG: 50 TABLET ORAL at 08:55

## 2020-01-01 RX ADMIN — THEOPHYLLINE ANHYDROUS 200 MG: 200 CAPSULE, EXTENDED RELEASE ORAL at 08:52

## 2020-01-01 RX ADMIN — SERTRALINE HYDROCHLORIDE 175 MG: 50 TABLET ORAL at 09:08

## 2020-01-01 RX ADMIN — SUCRALFATE 1000 MG: 1 SUSPENSION ORAL at 16:28

## 2020-01-01 RX ADMIN — SERTRALINE HYDROCHLORIDE 200 MG: 50 TABLET ORAL at 09:13

## 2020-01-01 RX ADMIN — TIOTROPIUM BROMIDE 18 MCG: 18 CAPSULE ORAL; RESPIRATORY (INHALATION) at 08:34

## 2020-01-01 RX ADMIN — THEOPHYLLINE ANHYDROUS 200 MG: 200 CAPSULE, EXTENDED RELEASE ORAL at 08:27

## 2020-01-01 RX ADMIN — TIOTROPIUM BROMIDE 18 MCG: 18 CAPSULE ORAL; RESPIRATORY (INHALATION) at 08:13

## 2020-01-01 RX ADMIN — CLOZAPINE 25 MG: 25 TABLET ORAL at 20:52

## 2020-01-01 RX ADMIN — CARBAMIDE PEROXIDE 6.5% 5 DROP: 6.5 LIQUID AURICULAR (OTIC) at 17:06

## 2020-01-01 RX ADMIN — FLUTICASONE FUROATE AND VILANTEROL TRIFENATATE 1 PUFF: 200; 25 POWDER RESPIRATORY (INHALATION) at 08:27

## 2020-01-01 RX ADMIN — SERTRALINE HYDROCHLORIDE 150 MG: 50 TABLET ORAL at 09:16

## 2020-01-01 RX ADMIN — PANTOPRAZOLE SODIUM 40 MG: 40 TABLET, DELAYED RELEASE ORAL at 06:05

## 2020-01-01 RX ADMIN — CLOZAPINE 25 MG: 25 TABLET ORAL at 17:06

## 2020-01-01 RX ADMIN — CLOZAPINE 25 MG: 25 TABLET ORAL at 20:42

## 2020-01-01 RX ADMIN — CLOZAPINE 50 MG: 25 TABLET ORAL at 21:43

## 2020-01-01 RX ADMIN — TIOTROPIUM BROMIDE 18 MCG: 18 CAPSULE ORAL; RESPIRATORY (INHALATION) at 08:32

## 2020-01-01 RX ADMIN — BACITRACIN ZINC 1 SMALL APPLICATION: 500 OINTMENT TOPICAL at 10:16

## 2020-01-01 RX ADMIN — SUCRALFATE 1000 MG: 1 SUSPENSION ORAL at 16:15

## 2020-01-01 RX ADMIN — SUCRALFATE 1000 MG: 1 SUSPENSION ORAL at 16:44

## 2020-01-01 RX ADMIN — CLOZAPINE 50 MG: 25 TABLET ORAL at 17:12

## 2020-01-01 RX ADMIN — THEOPHYLLINE ANHYDROUS 200 MG: 200 CAPSULE, EXTENDED RELEASE ORAL at 08:49

## 2020-01-01 RX ADMIN — CLOZAPINE 50 MG: 25 TABLET ORAL at 17:17

## 2020-01-01 RX ADMIN — THEOPHYLLINE ANHYDROUS 200 MG: 200 CAPSULE, EXTENDED RELEASE ORAL at 08:10

## 2020-01-01 RX ADMIN — CLOZAPINE 50 MG: 25 TABLET ORAL at 16:57

## 2020-01-01 RX ADMIN — PANTOPRAZOLE SODIUM 40 MG: 40 TABLET, DELAYED RELEASE ORAL at 07:00

## 2020-01-01 RX ADMIN — CLOZAPINE 50 MG: 25 TABLET ORAL at 17:02

## 2020-01-01 RX ADMIN — SERTRALINE HYDROCHLORIDE 200 MG: 50 TABLET ORAL at 08:44

## 2020-01-01 RX ADMIN — CLOZAPINE 50 MG: 25 TABLET ORAL at 17:44

## 2020-01-01 RX ADMIN — TIOTROPIUM BROMIDE 18 MCG: 18 CAPSULE ORAL; RESPIRATORY (INHALATION) at 08:14

## 2020-01-01 RX ADMIN — PANTOPRAZOLE SODIUM 40 MG: 40 TABLET, DELAYED RELEASE ORAL at 06:28

## 2020-01-01 RX ADMIN — CLOZAPINE 25 MG: 25 TABLET ORAL at 17:05

## 2020-01-01 RX ADMIN — TIOTROPIUM BROMIDE 18 MCG: 18 CAPSULE ORAL; RESPIRATORY (INHALATION) at 08:44

## 2020-01-01 RX ADMIN — SERTRALINE HYDROCHLORIDE 150 MG: 50 TABLET ORAL at 08:56

## 2020-01-01 RX ADMIN — PANTOPRAZOLE SODIUM 40 MG: 40 TABLET, DELAYED RELEASE ORAL at 05:57

## 2020-01-01 RX ADMIN — THEOPHYLLINE ANHYDROUS 200 MG: 200 CAPSULE, EXTENDED RELEASE ORAL at 10:43

## 2020-01-01 RX ADMIN — FLUTICASONE FUROATE AND VILANTEROL TRIFENATATE 1 PUFF: 200; 25 POWDER RESPIRATORY (INHALATION) at 08:56

## 2020-01-01 RX ADMIN — CLOZAPINE 25 MG: 25 TABLET ORAL at 20:51

## 2020-01-01 RX ADMIN — TIOTROPIUM BROMIDE 18 MCG: 18 CAPSULE ORAL; RESPIRATORY (INHALATION) at 09:34

## 2020-01-01 RX ADMIN — CLOZAPINE 50 MG: 25 TABLET ORAL at 17:52

## 2020-01-01 RX ADMIN — CLOZAPINE 50 MG: 25 TABLET ORAL at 20:47

## 2020-01-01 RX ADMIN — FLUTICASONE FUROATE AND VILANTEROL TRIFENATATE 1 PUFF: 200; 25 POWDER RESPIRATORY (INHALATION) at 08:55

## 2020-01-01 RX ADMIN — CLOZAPINE 25 MG: 25 TABLET ORAL at 20:54

## 2020-01-01 RX ADMIN — SUCRALFATE 1000 MG: 1 SUSPENSION ORAL at 16:12

## 2020-01-01 RX ADMIN — CLOZAPINE 25 MG: 25 TABLET ORAL at 20:55

## 2020-01-01 RX ADMIN — SERTRALINE HYDROCHLORIDE 175 MG: 50 TABLET ORAL at 09:27

## 2020-01-01 RX ADMIN — CLOZAPINE 50 MG: 25 TABLET ORAL at 21:38

## 2020-01-01 RX ADMIN — CLOZAPINE 50 MG: 25 TABLET ORAL at 18:00

## 2020-01-01 RX ADMIN — TIOTROPIUM BROMIDE 18 MCG: 18 CAPSULE ORAL; RESPIRATORY (INHALATION) at 09:01

## 2020-01-01 RX ADMIN — FLUTICASONE FUROATE AND VILANTEROL TRIFENATATE 1 PUFF: 200; 25 POWDER RESPIRATORY (INHALATION) at 08:08

## 2020-01-01 RX ADMIN — THEOPHYLLINE ANHYDROUS 200 MG: 200 CAPSULE, EXTENDED RELEASE ORAL at 08:06

## 2020-01-01 RX ADMIN — CLOZAPINE 50 MG: 25 TABLET ORAL at 21:42

## 2020-01-01 RX ADMIN — CARBAMIDE PEROXIDE 6.5% 5 DROP: 6.5 LIQUID AURICULAR (OTIC) at 21:47

## 2020-01-01 RX ADMIN — SERTRALINE HYDROCHLORIDE 200 MG: 50 TABLET ORAL at 08:09

## 2020-01-01 RX ADMIN — PANTOPRAZOLE SODIUM 40 MG: 40 TABLET, DELAYED RELEASE ORAL at 06:42

## 2020-01-01 RX ADMIN — PANTOPRAZOLE SODIUM 40 MG: 40 TABLET, DELAYED RELEASE ORAL at 05:59

## 2020-01-01 RX ADMIN — CARBAMIDE PEROXIDE 6.5% 5 DROP: 6.5 LIQUID AURICULAR (OTIC) at 08:39

## 2020-01-01 RX ADMIN — LEVOTHYROXINE SODIUM 125 MCG: 125 TABLET ORAL at 06:12

## 2020-01-01 RX ADMIN — PANTOPRAZOLE SODIUM 40 MG: 40 TABLET, DELAYED RELEASE ORAL at 05:42

## 2020-01-01 RX ADMIN — CLOZAPINE 25 MG: 25 TABLET ORAL at 17:12

## 2020-01-01 RX ADMIN — CLOZAPINE 25 MG: 25 TABLET ORAL at 18:01

## 2020-01-01 RX ADMIN — THEOPHYLLINE ANHYDROUS 200 MG: 200 CAPSULE, EXTENDED RELEASE ORAL at 08:37

## 2020-01-01 RX ADMIN — TIOTROPIUM BROMIDE 18 MCG: 18 CAPSULE ORAL; RESPIRATORY (INHALATION) at 08:27

## 2020-01-01 RX ADMIN — SUCRALFATE 1000 MG: 1 SUSPENSION ORAL at 16:02

## 2020-01-01 RX ADMIN — TIOTROPIUM BROMIDE 18 MCG: 18 CAPSULE ORAL; RESPIRATORY (INHALATION) at 09:00

## 2020-01-01 RX ADMIN — CARBAMIDE PEROXIDE 6.5% 5 DROP: 6.5 LIQUID AURICULAR (OTIC) at 10:00

## 2020-01-01 RX ADMIN — SUCRALFATE 1000 MG: 1 SUSPENSION ORAL at 16:46

## 2020-01-01 RX ADMIN — SERTRALINE HYDROCHLORIDE 200 MG: 50 TABLET ORAL at 09:20

## 2020-01-01 RX ADMIN — CLOZAPINE 50 MG: 25 TABLET ORAL at 17:07

## 2020-01-01 RX ADMIN — TIOTROPIUM BROMIDE 18 MCG: 18 CAPSULE ORAL; RESPIRATORY (INHALATION) at 08:23

## 2020-01-01 RX ADMIN — CLOZAPINE 25 MG: 25 TABLET ORAL at 21:00

## 2020-01-01 RX ADMIN — SERTRALINE HYDROCHLORIDE 200 MG: 50 TABLET ORAL at 08:52

## 2020-01-01 RX ADMIN — SUCRALFATE 1000 MG: 1 SUSPENSION ORAL at 16:39

## 2020-01-01 RX ADMIN — TIOTROPIUM BROMIDE 18 MCG: 18 CAPSULE ORAL; RESPIRATORY (INHALATION) at 09:10

## 2020-01-01 RX ADMIN — CLOZAPINE 50 MG: 25 TABLET ORAL at 21:10

## 2020-01-01 RX ADMIN — CARBAMIDE PEROXIDE 6.5% 5 DROP: 6.5 LIQUID AURICULAR (OTIC) at 21:41

## 2020-01-01 RX ADMIN — SUCRALFATE 1000 MG: 1 SUSPENSION ORAL at 17:13

## 2020-01-01 RX ADMIN — CLOZAPINE 25 MG: 25 TABLET ORAL at 17:20

## 2020-01-01 RX ADMIN — THEOPHYLLINE ANHYDROUS 200 MG: 200 CAPSULE, EXTENDED RELEASE ORAL at 09:02

## 2020-01-01 RX ADMIN — TIOTROPIUM BROMIDE 18 MCG: 18 CAPSULE ORAL; RESPIRATORY (INHALATION) at 08:42

## 2020-01-01 RX ADMIN — TIOTROPIUM BROMIDE 18 MCG: 18 CAPSULE ORAL; RESPIRATORY (INHALATION) at 08:48

## 2020-01-01 RX ADMIN — FLUTICASONE FUROATE AND VILANTEROL TRIFENATATE 1 PUFF: 200; 25 POWDER RESPIRATORY (INHALATION) at 08:25

## 2020-01-01 RX ADMIN — CLOZAPINE 25 MG: 25 TABLET ORAL at 20:53

## 2020-01-01 RX ADMIN — CLOZAPINE 50 MG: 25 TABLET ORAL at 22:01

## 2020-01-01 RX ADMIN — SUCRALFATE 1000 MG: 1 SUSPENSION ORAL at 16:53

## 2020-01-01 RX ADMIN — CLOZAPINE 25 MG: 25 TABLET ORAL at 21:07

## 2020-01-01 RX ADMIN — SUCRALFATE 1000 MG: 1 SUSPENSION ORAL at 06:23

## 2020-01-01 RX ADMIN — CARBAMIDE PEROXIDE 6.5% 5 DROP: 6.5 LIQUID AURICULAR (OTIC) at 09:01

## 2020-01-01 RX ADMIN — CLOZAPINE 50 MG: 25 TABLET ORAL at 20:51

## 2020-01-01 RX ADMIN — SERTRALINE HYDROCHLORIDE 50 MG: 50 TABLET ORAL at 21:42

## 2020-01-01 RX ADMIN — CLOZAPINE 25 MG: 25 TABLET ORAL at 21:40

## 2020-01-01 RX ADMIN — SUCRALFATE 1000 MG: 1 SUSPENSION ORAL at 16:10

## 2020-01-01 RX ADMIN — TIOTROPIUM BROMIDE 18 MCG: 18 CAPSULE ORAL; RESPIRATORY (INHALATION) at 08:53

## 2020-01-01 RX ADMIN — SERTRALINE HYDROCHLORIDE 175 MG: 50 TABLET ORAL at 08:42

## 2020-01-01 RX ADMIN — CLOZAPINE 25 MG: 25 TABLET ORAL at 16:34

## 2020-01-01 RX ADMIN — FLUTICASONE FUROATE AND VILANTEROL TRIFENATATE 1 PUFF: 200; 25 POWDER RESPIRATORY (INHALATION) at 08:45

## 2020-01-01 RX ADMIN — SERTRALINE HYDROCHLORIDE 150 MG: 50 TABLET ORAL at 09:15

## 2020-01-01 RX ADMIN — SUCRALFATE 1000 MG: 1 SUSPENSION ORAL at 16:00

## 2020-01-01 RX ADMIN — CLOZAPINE 25 MG: 25 TABLET ORAL at 17:16

## 2020-01-01 RX ADMIN — CLOZAPINE 25 MG: 25 TABLET ORAL at 17:08

## 2020-01-01 RX ADMIN — CLOZAPINE 25 MG: 25 TABLET ORAL at 20:45

## 2020-01-01 RX ADMIN — SERTRALINE HYDROCHLORIDE 175 MG: 50 TABLET ORAL at 09:44

## 2020-01-01 RX ADMIN — CLOZAPINE 25 MG: 25 TABLET ORAL at 20:46

## 2020-01-01 RX ADMIN — TIOTROPIUM BROMIDE 18 MCG: 18 CAPSULE ORAL; RESPIRATORY (INHALATION) at 08:07

## 2020-01-01 RX ADMIN — LEVOTHYROXINE SODIUM 125 MCG: 125 TABLET ORAL at 06:17

## 2020-01-01 RX ADMIN — SERTRALINE HYDROCHLORIDE 50 MG: 50 TABLET ORAL at 08:33

## 2020-01-01 RX ADMIN — LEVOTHYROXINE SODIUM 125 MCG: 125 TABLET ORAL at 06:26

## 2020-01-01 RX ADMIN — FLUTICASONE FUROATE AND VILANTEROL TRIFENATATE 1 PUFF: 200; 25 POWDER RESPIRATORY (INHALATION) at 08:49

## 2020-01-01 RX ADMIN — TIOTROPIUM BROMIDE 18 MCG: 18 CAPSULE ORAL; RESPIRATORY (INHALATION) at 08:51

## 2020-01-01 RX ADMIN — SERTRALINE HYDROCHLORIDE 150 MG: 50 TABLET ORAL at 08:23

## 2020-01-01 RX ADMIN — SUCRALFATE 1000 MG: 1 SUSPENSION ORAL at 06:18

## 2020-01-01 RX ADMIN — CLOZAPINE 50 MG: 25 TABLET ORAL at 15:58

## 2020-01-01 RX ADMIN — CLOZAPINE 25 MG: 25 TABLET ORAL at 17:53

## 2020-01-01 RX ADMIN — SERTRALINE HYDROCHLORIDE 50 MG: 50 TABLET ORAL at 21:09

## 2020-01-01 RX ADMIN — THEOPHYLLINE ANHYDROUS 200 MG: 200 CAPSULE, EXTENDED RELEASE ORAL at 09:11

## 2020-01-01 RX ADMIN — SERTRALINE HYDROCHLORIDE 175 MG: 50 TABLET ORAL at 08:03

## 2020-01-01 RX ADMIN — SUCRALFATE 1000 MG: 1 SUSPENSION ORAL at 06:03

## 2020-01-01 RX ADMIN — CLOZAPINE 50 MG: 25 TABLET ORAL at 17:16

## 2020-01-01 RX ADMIN — THEOPHYLLINE ANHYDROUS 200 MG: 200 CAPSULE, EXTENDED RELEASE ORAL at 08:08

## 2020-01-01 RX ADMIN — SUCRALFATE 1000 MG: 1 SUSPENSION ORAL at 15:53

## 2020-01-01 RX ADMIN — PANTOPRAZOLE SODIUM 40 MG: 40 TABLET, DELAYED RELEASE ORAL at 05:54

## 2020-01-01 RX ADMIN — CLOZAPINE 25 MG: 25 TABLET ORAL at 17:18

## 2020-01-01 RX ADMIN — TIOTROPIUM BROMIDE 18 MCG: 18 CAPSULE ORAL; RESPIRATORY (INHALATION) at 09:07

## 2020-01-01 RX ADMIN — THEOPHYLLINE ANHYDROUS 200 MG: 200 CAPSULE, EXTENDED RELEASE ORAL at 08:45

## 2020-01-01 RX ADMIN — CLOZAPINE 50 MG: 25 TABLET ORAL at 20:42

## 2020-01-01 RX ADMIN — CLOZAPINE 50 MG: 25 TABLET ORAL at 20:55

## 2020-01-01 RX ADMIN — CLOZAPINE 25 MG: 25 TABLET ORAL at 16:25

## 2020-01-01 RX ADMIN — CLOZAPINE 50 MG: 25 TABLET ORAL at 21:22

## 2020-01-01 RX ADMIN — THEOPHYLLINE ANHYDROUS 200 MG: 200 CAPSULE, EXTENDED RELEASE ORAL at 08:57

## 2020-01-01 RX ADMIN — TIOTROPIUM BROMIDE 18 MCG: 18 CAPSULE ORAL; RESPIRATORY (INHALATION) at 08:26

## 2020-01-01 RX ADMIN — SERTRALINE HYDROCHLORIDE 200 MG: 50 TABLET ORAL at 08:45

## 2020-01-01 RX ADMIN — FLUTICASONE FUROATE AND VILANTEROL TRIFENATATE 1 PUFF: 200; 25 POWDER RESPIRATORY (INHALATION) at 08:09

## 2020-01-01 RX ADMIN — BACITRACIN ZINC 1 SMALL APPLICATION: 500 OINTMENT TOPICAL at 08:24

## 2020-01-01 RX ADMIN — CLOZAPINE 50 MG: 25 TABLET ORAL at 20:40

## 2020-01-01 RX ADMIN — SUCRALFATE 1000 MG: 1 SUSPENSION ORAL at 06:11

## 2020-01-01 RX ADMIN — PANTOPRAZOLE SODIUM 40 MG: 40 TABLET, DELAYED RELEASE ORAL at 05:43

## 2020-01-01 RX ADMIN — CARBAMIDE PEROXIDE 6.5% 5 DROP: 6.5 LIQUID AURICULAR (OTIC) at 21:44

## 2020-01-01 RX ADMIN — SERTRALINE HYDROCHLORIDE 175 MG: 50 TABLET ORAL at 08:40

## 2020-01-01 RX ADMIN — SERTRALINE HYDROCHLORIDE 175 MG: 50 TABLET ORAL at 08:19

## 2020-01-01 RX ADMIN — CLOZAPINE 50 MG: 25 TABLET ORAL at 17:15

## 2020-01-01 RX ADMIN — FLUTICASONE FUROATE AND VILANTEROL TRIFENATATE 1 PUFF: 200; 25 POWDER RESPIRATORY (INHALATION) at 08:43

## 2020-01-01 RX ADMIN — SERTRALINE HYDROCHLORIDE 175 MG: 50 TABLET ORAL at 08:24

## 2020-01-01 RX ADMIN — CLOZAPINE 50 MG: 25 TABLET ORAL at 16:53

## 2020-01-01 RX ADMIN — CLOZAPINE 50 MG: 25 TABLET ORAL at 21:04

## 2020-01-01 RX ADMIN — FLUTICASONE FUROATE AND VILANTEROL TRIFENATATE 1 PUFF: 200; 25 POWDER RESPIRATORY (INHALATION) at 08:26

## 2020-01-01 RX ADMIN — CLOZAPINE 25 MG: 25 TABLET ORAL at 17:15

## 2020-01-01 RX ADMIN — TIOTROPIUM BROMIDE 18 MCG: 18 CAPSULE ORAL; RESPIRATORY (INHALATION) at 08:33

## 2020-01-01 RX ADMIN — CARBAMIDE PEROXIDE 6.5% 5 DROP: 6.5 LIQUID AURICULAR (OTIC) at 21:33

## 2020-01-01 RX ADMIN — CLOZAPINE 25 MG: 25 TABLET ORAL at 21:35

## 2020-01-01 RX ADMIN — CLOZAPINE 50 MG: 25 TABLET ORAL at 20:56

## 2020-01-01 RX ADMIN — THEOPHYLLINE ANHYDROUS 200 MG: 200 CAPSULE, EXTENDED RELEASE ORAL at 09:03

## 2020-01-01 RX ADMIN — TIOTROPIUM BROMIDE 18 MCG: 18 CAPSULE ORAL; RESPIRATORY (INHALATION) at 08:10

## 2020-01-01 RX ADMIN — FLUTICASONE FUROATE AND VILANTEROL TRIFENATATE 1 PUFF: 200; 25 POWDER RESPIRATORY (INHALATION) at 08:51

## 2020-01-01 RX ADMIN — PANTOPRAZOLE SODIUM 40 MG: 40 TABLET, DELAYED RELEASE ORAL at 06:08

## 2020-01-01 RX ADMIN — CLOZAPINE 25 MG: 25 TABLET ORAL at 17:13

## 2020-01-01 RX ADMIN — CLOZAPINE 50 MG: 25 TABLET ORAL at 17:51

## 2020-01-01 RX ADMIN — CLOZAPINE 50 MG: 25 TABLET ORAL at 17:01

## 2020-01-01 RX ADMIN — SERTRALINE HYDROCHLORIDE 50 MG: 50 TABLET ORAL at 08:47

## 2020-01-01 RX ADMIN — CLOZAPINE 50 MG: 25 TABLET ORAL at 17:21

## 2020-01-01 RX ADMIN — SERTRALINE HYDROCHLORIDE 200 MG: 50 TABLET ORAL at 08:37

## 2020-01-01 RX ADMIN — TIOTROPIUM BROMIDE 18 MCG: 18 CAPSULE ORAL; RESPIRATORY (INHALATION) at 08:15

## 2020-01-01 RX ADMIN — CLOZAPINE 25 MG: 25 TABLET ORAL at 17:10

## 2020-01-01 RX ADMIN — CLOZAPINE 25 MG: 25 TABLET ORAL at 20:24

## 2020-01-01 RX ADMIN — CLOZAPINE 25 MG: 25 TABLET ORAL at 17:04

## 2020-01-01 RX ADMIN — LEVOTHYROXINE SODIUM 125 MCG: 125 TABLET ORAL at 06:21

## 2020-01-01 RX ADMIN — CLOZAPINE 25 MG: 25 TABLET ORAL at 21:37

## 2020-01-01 RX ADMIN — CLOZAPINE 50 MG: 25 TABLET ORAL at 18:11

## 2020-01-01 RX ADMIN — CARBAMIDE PEROXIDE 6.5% 5 DROP: 6.5 LIQUID AURICULAR (OTIC) at 21:16

## 2020-01-01 RX ADMIN — FLUTICASONE FUROATE AND VILANTEROL TRIFENATATE 1 PUFF: 200; 25 POWDER RESPIRATORY (INHALATION) at 08:17

## 2020-01-01 RX ADMIN — CLOZAPINE 25 MG: 25 TABLET ORAL at 16:59

## 2020-01-01 RX ADMIN — SUCRALFATE 1000 MG: 1 SUSPENSION ORAL at 16:24

## 2020-01-01 RX ADMIN — SUCRALFATE 1000 MG: 1 SUSPENSION ORAL at 16:35

## 2020-01-01 RX ADMIN — CARBAMIDE PEROXIDE 6.5% 5 DROP: 6.5 LIQUID AURICULAR (OTIC) at 13:20

## 2020-01-01 RX ADMIN — CLOZAPINE 25 MG: 25 TABLET ORAL at 21:44

## 2020-01-01 RX ADMIN — CARBAMIDE PEROXIDE 6.5% 5 DROP: 6.5 LIQUID AURICULAR (OTIC) at 21:43

## 2020-01-01 RX ADMIN — FLUTICASONE FUROATE AND VILANTEROL TRIFENATATE 1 PUFF: 200; 25 POWDER RESPIRATORY (INHALATION) at 08:23

## 2020-01-01 RX ADMIN — FLUTICASONE FUROATE AND VILANTEROL TRIFENATATE 1 PUFF: 200; 25 POWDER RESPIRATORY (INHALATION) at 08:11

## 2020-01-01 RX ADMIN — SUCRALFATE 1000 MG: 1 SUSPENSION ORAL at 06:21

## 2020-01-01 RX ADMIN — SUCRALFATE 1000 MG: 1 SUSPENSION ORAL at 16:40

## 2020-01-01 RX ADMIN — TIOTROPIUM BROMIDE 18 MCG: 18 CAPSULE ORAL; RESPIRATORY (INHALATION) at 08:05

## 2020-01-01 RX ADMIN — TIOTROPIUM BROMIDE 18 MCG: 18 CAPSULE ORAL; RESPIRATORY (INHALATION) at 08:16

## 2020-01-01 RX ADMIN — THEOPHYLLINE ANHYDROUS 200 MG: 200 CAPSULE, EXTENDED RELEASE ORAL at 08:39

## 2020-01-01 RX ADMIN — SUCRALFATE 1000 MG: 1 SUSPENSION ORAL at 06:38

## 2020-01-01 RX ADMIN — SERTRALINE HYDROCHLORIDE 150 MG: 50 TABLET ORAL at 08:55

## 2020-01-01 RX ADMIN — SERTRALINE HYDROCHLORIDE 175 MG: 50 TABLET ORAL at 08:02

## 2020-01-01 RX ADMIN — SERTRALINE HYDROCHLORIDE 150 MG: 50 TABLET ORAL at 08:45

## 2020-01-01 RX ADMIN — SERTRALINE HYDROCHLORIDE 175 MG: 50 TABLET ORAL at 09:17

## 2020-01-01 RX ADMIN — CLOZAPINE 25 MG: 25 TABLET ORAL at 21:21

## 2020-01-01 RX ADMIN — SERTRALINE HYDROCHLORIDE 50 MG: 50 TABLET ORAL at 08:41

## 2020-01-01 RX ADMIN — CLOZAPINE 50 MG: 25 TABLET ORAL at 21:36

## 2020-01-01 RX ADMIN — CLOZAPINE 25 MG: 25 TABLET ORAL at 21:32

## 2020-01-01 RX ADMIN — THEOPHYLLINE ANHYDROUS 200 MG: 200 CAPSULE, EXTENDED RELEASE ORAL at 08:30

## 2020-01-01 RX ADMIN — TIOTROPIUM BROMIDE 18 MCG: 18 CAPSULE ORAL; RESPIRATORY (INHALATION) at 09:17

## 2020-01-01 RX ADMIN — LEVOTHYROXINE SODIUM 125 MCG: 125 TABLET ORAL at 07:00

## 2020-01-01 RX ADMIN — PANTOPRAZOLE SODIUM 40 MG: 40 TABLET, DELAYED RELEASE ORAL at 06:10

## 2020-01-01 RX ADMIN — THEOPHYLLINE ANHYDROUS 200 MG: 200 CAPSULE, EXTENDED RELEASE ORAL at 09:13

## 2020-01-01 RX ADMIN — PANTOPRAZOLE SODIUM 40 MG: 40 TABLET, DELAYED RELEASE ORAL at 06:11

## 2020-01-01 RX ADMIN — SERTRALINE HYDROCHLORIDE 150 MG: 50 TABLET ORAL at 08:51

## 2020-01-01 RX ADMIN — SUCRALFATE 1000 MG: 1 SUSPENSION ORAL at 16:20

## 2020-01-01 RX ADMIN — THEOPHYLLINE ANHYDROUS 200 MG: 200 CAPSULE, EXTENDED RELEASE ORAL at 08:42

## 2020-01-01 RX ADMIN — CLOZAPINE 25 MG: 25 TABLET ORAL at 20:41

## 2020-01-01 RX ADMIN — CLOZAPINE 25 MG: 25 TABLET ORAL at 20:57

## 2020-01-01 RX ADMIN — TIOTROPIUM BROMIDE 18 MCG: 18 CAPSULE ORAL; RESPIRATORY (INHALATION) at 08:55

## 2020-01-01 RX ADMIN — SUCRALFATE 1000 MG: 1 SUSPENSION ORAL at 15:58

## 2020-01-01 RX ADMIN — FLUTICASONE FUROATE AND VILANTEROL TRIFENATATE 1 PUFF: 200; 25 POWDER RESPIRATORY (INHALATION) at 09:00

## 2020-01-01 RX ADMIN — SERTRALINE HYDROCHLORIDE 150 MG: 50 TABLET ORAL at 08:59

## 2020-01-01 RX ADMIN — PANTOPRAZOLE SODIUM 40 MG: 40 TABLET, DELAYED RELEASE ORAL at 06:34

## 2020-01-01 RX ADMIN — SERTRALINE HYDROCHLORIDE 175 MG: 50 TABLET ORAL at 08:45

## 2020-01-01 RX ADMIN — SERTRALINE HYDROCHLORIDE 175 MG: 50 TABLET ORAL at 08:53

## 2020-01-01 RX ADMIN — CLOZAPINE 25 MG: 25 TABLET ORAL at 21:59

## 2020-01-01 RX ADMIN — CLOZAPINE 50 MG: 25 TABLET ORAL at 20:59

## 2020-01-01 RX ADMIN — SERTRALINE HYDROCHLORIDE 175 MG: 50 TABLET ORAL at 08:59

## 2020-01-01 RX ADMIN — CARBAMIDE PEROXIDE 6.5% 5 DROP: 6.5 LIQUID AURICULAR (OTIC) at 21:42

## 2020-01-01 RX ADMIN — TIOTROPIUM BROMIDE 18 MCG: 18 CAPSULE ORAL; RESPIRATORY (INHALATION) at 08:17

## 2020-01-01 RX ADMIN — CLOZAPINE 50 MG: 25 TABLET ORAL at 20:57

## 2020-01-01 RX ADMIN — CLOZAPINE 25 MG: 25 TABLET ORAL at 16:52

## 2020-01-01 RX ADMIN — CARBAMIDE PEROXIDE 6.5% 5 DROP: 6.5 LIQUID AURICULAR (OTIC) at 17:40

## 2020-01-01 RX ADMIN — CLOZAPINE 25 MG: 25 TABLET ORAL at 17:41

## 2020-01-01 RX ADMIN — SERTRALINE HYDROCHLORIDE 200 MG: 50 TABLET ORAL at 08:43

## 2020-01-01 RX ADMIN — SUCRALFATE 1000 MG: 1 SUSPENSION ORAL at 15:56

## 2020-01-01 RX ADMIN — THEOPHYLLINE ANHYDROUS 200 MG: 200 CAPSULE, EXTENDED RELEASE ORAL at 08:05

## 2020-01-01 RX ADMIN — FLUTICASONE FUROATE AND VILANTEROL TRIFENATATE 1 PUFF: 200; 25 POWDER RESPIRATORY (INHALATION) at 09:02

## 2020-01-01 RX ADMIN — SERTRALINE HYDROCHLORIDE 200 MG: 50 TABLET ORAL at 08:08

## 2020-01-01 RX ADMIN — SUCRALFATE 1000 MG: 1 SUSPENSION ORAL at 15:54

## 2020-01-01 RX ADMIN — FLUTICASONE FUROATE AND VILANTEROL TRIFENATATE 1 PUFF: 200; 25 POWDER RESPIRATORY (INHALATION) at 08:57

## 2020-01-01 RX ADMIN — FLUTICASONE FUROATE AND VILANTEROL TRIFENATATE 1 PUFF: 200; 25 POWDER RESPIRATORY (INHALATION) at 09:26

## 2020-01-01 RX ADMIN — SUCRALFATE 1000 MG: 1 SUSPENSION ORAL at 16:07

## 2020-01-01 RX ADMIN — SERTRALINE HYDROCHLORIDE 50 MG: 50 TABLET ORAL at 08:21

## 2020-01-01 RX ADMIN — CLOZAPINE 25 MG: 25 TABLET ORAL at 17:50

## 2020-01-01 RX ADMIN — SUCRALFATE 1000 MG: 1 SUSPENSION ORAL at 16:58

## 2020-01-01 RX ADMIN — CLOZAPINE 50 MG: 25 TABLET ORAL at 17:50

## 2020-01-01 RX ADMIN — CLOZAPINE 50 MG: 25 TABLET ORAL at 18:01

## 2020-01-01 RX ADMIN — LEVOTHYROXINE SODIUM 125 MCG: 125 TABLET ORAL at 06:15

## 2020-01-01 RX ADMIN — LEVOTHYROXINE SODIUM 125 MCG: 125 TABLET ORAL at 06:19

## 2020-01-01 RX ADMIN — TIOTROPIUM BROMIDE 18 MCG: 18 CAPSULE ORAL; RESPIRATORY (INHALATION) at 08:02

## 2020-01-01 RX ADMIN — FLUTICASONE FUROATE AND VILANTEROL TRIFENATATE 1 PUFF: 200; 25 POWDER RESPIRATORY (INHALATION) at 08:04

## 2020-01-01 RX ADMIN — LEVOTHYROXINE SODIUM 125 MCG: 125 TABLET ORAL at 06:18

## 2020-01-01 RX ADMIN — CLOZAPINE 50 MG: 25 TABLET ORAL at 17:31

## 2020-01-01 RX ADMIN — CARBAMIDE PEROXIDE 6.5% 5 DROP: 6.5 LIQUID AURICULAR (OTIC) at 21:30

## 2020-01-01 RX ADMIN — CLOZAPINE 25 MG: 25 TABLET ORAL at 17:45

## 2020-01-01 RX ADMIN — CLOZAPINE 25 MG: 25 TABLET ORAL at 16:43

## 2020-01-01 RX ADMIN — FLUTICASONE FUROATE AND VILANTEROL TRIFENATATE 1 PUFF: 200; 25 POWDER RESPIRATORY (INHALATION) at 08:33

## 2020-01-01 RX ADMIN — FLUTICASONE FUROATE AND VILANTEROL TRIFENATATE 1 PUFF: 200; 25 POWDER RESPIRATORY (INHALATION) at 08:46

## 2020-01-01 RX ADMIN — FLUTICASONE FUROATE AND VILANTEROL TRIFENATATE 1 PUFF: 200; 25 POWDER RESPIRATORY (INHALATION) at 08:22

## 2020-01-01 RX ADMIN — CARBAMIDE PEROXIDE 6.5% 5 DROP: 6.5 LIQUID AURICULAR (OTIC) at 21:07

## 2020-01-01 RX ADMIN — SERTRALINE HYDROCHLORIDE 150 MG: 50 TABLET ORAL at 09:10

## 2020-01-01 RX ADMIN — LEVOTHYROXINE SODIUM 125 MCG: 125 TABLET ORAL at 06:42

## 2020-01-01 RX ADMIN — SERTRALINE HYDROCHLORIDE 175 MG: 50 TABLET ORAL at 08:34

## 2020-01-01 RX ADMIN — CLOZAPINE 50 MG: 25 TABLET ORAL at 21:34

## 2020-01-01 RX ADMIN — CLOZAPINE 25 MG: 25 TABLET ORAL at 21:34

## 2020-01-01 RX ADMIN — LEVOTHYROXINE SODIUM 125 MCG: 125 TABLET ORAL at 06:28

## 2020-01-01 RX ADMIN — SERTRALINE HYDROCHLORIDE 200 MG: 50 TABLET ORAL at 08:07

## 2020-01-01 RX ADMIN — CLOZAPINE 25 MG: 25 TABLET ORAL at 18:00

## 2020-01-01 RX ADMIN — CLOZAPINE 25 MG: 25 TABLET ORAL at 16:53

## 2020-01-01 RX ADMIN — FLUTICASONE FUROATE AND VILANTEROL TRIFENATATE 1 PUFF: 200; 25 POWDER RESPIRATORY (INHALATION) at 09:03

## 2020-01-01 RX ADMIN — CLOZAPINE 25 MG: 25 TABLET ORAL at 17:57

## 2020-01-01 RX ADMIN — CLOZAPINE 50 MG: 25 TABLET ORAL at 16:52

## 2020-01-01 RX ADMIN — THEOPHYLLINE ANHYDROUS 200 MG: 200 CAPSULE, EXTENDED RELEASE ORAL at 08:04

## 2020-01-01 RX ADMIN — PANTOPRAZOLE SODIUM 40 MG: 40 TABLET, DELAYED RELEASE ORAL at 06:26

## 2020-01-01 RX ADMIN — CLOZAPINE 25 MG: 25 TABLET ORAL at 20:27

## 2020-01-01 RX ADMIN — CLOZAPINE 25 MG: 25 TABLET ORAL at 16:45

## 2020-01-01 RX ADMIN — CLOZAPINE 50 MG: 25 TABLET ORAL at 16:37

## 2020-01-01 RX ADMIN — THEOPHYLLINE ANHYDROUS 200 MG: 200 CAPSULE, EXTENDED RELEASE ORAL at 09:20

## 2020-01-01 RX ADMIN — SERTRALINE HYDROCHLORIDE 50 MG: 50 TABLET ORAL at 20:40

## 2020-01-01 RX ADMIN — CLOZAPINE 25 MG: 25 TABLET ORAL at 20:39

## 2020-01-01 RX ADMIN — CLOZAPINE 50 MG: 25 TABLET ORAL at 21:44

## 2020-01-01 RX ADMIN — LEVOTHYROXINE SODIUM 125 MCG: 125 TABLET ORAL at 05:42

## 2020-01-01 RX ADMIN — SERTRALINE HYDROCHLORIDE 200 MG: 50 TABLET ORAL at 08:16

## 2020-01-01 RX ADMIN — CLOZAPINE 50 MG: 25 TABLET ORAL at 21:19

## 2020-01-01 RX ADMIN — CLOZAPINE 25 MG: 25 TABLET ORAL at 18:02

## 2020-01-01 RX ADMIN — SERTRALINE HYDROCHLORIDE 175 MG: 50 TABLET ORAL at 09:15

## 2020-01-01 RX ADMIN — CLOZAPINE 50 MG: 25 TABLET ORAL at 21:13

## 2020-01-01 RX ADMIN — CLOZAPINE 25 MG: 25 TABLET ORAL at 20:50

## 2020-01-01 RX ADMIN — CLOZAPINE 50 MG: 25 TABLET ORAL at 17:56

## 2020-01-01 RX ADMIN — SERTRALINE HYDROCHLORIDE 175 MG: 50 TABLET ORAL at 08:11

## 2020-01-01 RX ADMIN — SERTRALINE HYDROCHLORIDE 175 MG: 50 TABLET ORAL at 08:36

## 2020-01-01 RX ADMIN — CLOZAPINE 50 MG: 25 TABLET ORAL at 16:56

## 2020-01-01 RX ADMIN — CLOZAPINE 50 MG: 25 TABLET ORAL at 16:30

## 2020-01-01 RX ADMIN — CLOZAPINE 50 MG: 25 TABLET ORAL at 18:02

## 2020-01-01 RX ADMIN — CARBAMIDE PEROXIDE 6.5% 5 DROP: 6.5 LIQUID AURICULAR (OTIC) at 21:10

## 2020-01-01 RX ADMIN — CLOZAPINE 50 MG: 25 TABLET ORAL at 17:57

## 2020-01-01 RX ADMIN — TIOTROPIUM BROMIDE 18 MCG: 18 CAPSULE ORAL; RESPIRATORY (INHALATION) at 08:21

## 2020-01-01 RX ADMIN — PANTOPRAZOLE SODIUM 40 MG: 40 TABLET, DELAYED RELEASE ORAL at 06:16

## 2020-01-01 RX ADMIN — CLOZAPINE 50 MG: 25 TABLET ORAL at 17:45

## 2020-01-01 RX ADMIN — SERTRALINE HYDROCHLORIDE 200 MG: 50 TABLET ORAL at 08:05

## 2020-01-01 RX ADMIN — CLOZAPINE 25 MG: 25 TABLET ORAL at 16:23

## 2020-01-01 RX ADMIN — TIOTROPIUM BROMIDE 18 MCG: 18 CAPSULE ORAL; RESPIRATORY (INHALATION) at 09:02

## 2020-01-01 RX ADMIN — CLOZAPINE 25 MG: 25 TABLET ORAL at 21:20

## 2020-01-01 RX ADMIN — CLOZAPINE 50 MG: 25 TABLET ORAL at 17:19

## 2020-01-01 RX ADMIN — PANTOPRAZOLE SODIUM 40 MG: 40 TABLET, DELAYED RELEASE ORAL at 06:21

## 2020-01-01 RX ADMIN — FLUTICASONE FUROATE AND VILANTEROL TRIFENATATE 1 PUFF: 200; 25 POWDER RESPIRATORY (INHALATION) at 08:48

## 2020-01-01 RX ADMIN — THEOPHYLLINE ANHYDROUS 200 MG: 200 CAPSULE, EXTENDED RELEASE ORAL at 09:06

## 2020-01-01 RX ADMIN — PANTOPRAZOLE SODIUM 40 MG: 40 TABLET, DELAYED RELEASE ORAL at 06:17

## 2020-01-01 RX ADMIN — FLUTICASONE FUROATE AND VILANTEROL TRIFENATATE 1 PUFF: 200; 25 POWDER RESPIRATORY (INHALATION) at 08:41

## 2020-01-01 RX ADMIN — CLOZAPINE 25 MG: 25 TABLET ORAL at 20:49

## 2020-01-01 RX ADMIN — CLOZAPINE 25 MG: 25 TABLET ORAL at 21:01

## 2020-01-01 RX ADMIN — BACITRACIN ZINC 1 SMALL APPLICATION: 500 OINTMENT TOPICAL at 16:00

## 2020-01-01 RX ADMIN — THEOPHYLLINE ANHYDROUS 200 MG: 200 CAPSULE, EXTENDED RELEASE ORAL at 08:47

## 2020-01-01 RX ADMIN — FLUTICASONE FUROATE AND VILANTEROL TRIFENATATE 1 PUFF: 200; 25 POWDER RESPIRATORY (INHALATION) at 09:20

## 2020-01-01 RX ADMIN — CLOZAPINE 50 MG: 25 TABLET ORAL at 16:24

## 2020-01-01 RX ADMIN — SERTRALINE HYDROCHLORIDE 150 MG: 50 TABLET ORAL at 08:21

## 2020-01-01 RX ADMIN — CLOZAPINE 50 MG: 25 TABLET ORAL at 18:04

## 2020-01-01 RX ADMIN — LEVOTHYROXINE SODIUM 125 MCG: 125 TABLET ORAL at 06:20

## 2020-01-01 RX ADMIN — FLUTICASONE FUROATE AND VILANTEROL TRIFENATATE 1 PUFF: 200; 25 POWDER RESPIRATORY (INHALATION) at 08:59

## 2020-01-01 RX ADMIN — CLOZAPINE 25 MG: 25 TABLET ORAL at 17:39

## 2020-01-01 RX ADMIN — SUCRALFATE 1000 MG: 1 SUSPENSION ORAL at 16:48

## 2020-01-01 RX ADMIN — CLOZAPINE 25 MG: 25 TABLET ORAL at 21:33

## 2020-01-01 RX ADMIN — CLOZAPINE 25 MG: 25 TABLET ORAL at 18:03

## 2020-01-01 RX ADMIN — THEOPHYLLINE ANHYDROUS 200 MG: 200 CAPSULE, EXTENDED RELEASE ORAL at 09:17

## 2020-01-01 RX ADMIN — CLOZAPINE 50 MG: 25 TABLET ORAL at 20:32

## 2020-01-01 RX ADMIN — FLUTICASONE FUROATE AND VILANTEROL TRIFENATATE 1 PUFF: 200; 25 POWDER RESPIRATORY (INHALATION) at 08:37

## 2020-01-01 RX ADMIN — CARBAMIDE PEROXIDE 6.5% 5 DROP: 6.5 LIQUID AURICULAR (OTIC) at 21:45

## 2020-01-01 RX ADMIN — TIOTROPIUM BROMIDE 18 MCG: 18 CAPSULE ORAL; RESPIRATORY (INHALATION) at 09:15

## 2020-01-01 RX ADMIN — CLOZAPINE 50 MG: 25 TABLET ORAL at 22:00

## 2020-01-01 RX ADMIN — SERTRALINE HYDROCHLORIDE 150 MG: 50 TABLET ORAL at 08:13

## 2020-01-01 RX ADMIN — CLOZAPINE 25 MG: 25 TABLET ORAL at 21:45

## 2020-01-01 RX ADMIN — SERTRALINE HYDROCHLORIDE 175 MG: 50 TABLET ORAL at 09:06

## 2020-01-01 RX ADMIN — CARBAMIDE PEROXIDE 6.5% 5 DROP: 6.5 LIQUID AURICULAR (OTIC) at 09:24

## 2020-01-01 RX ADMIN — CARBAMIDE PEROXIDE 6.5% 5 DROP: 6.5 LIQUID AURICULAR (OTIC) at 08:45

## 2020-01-01 RX ADMIN — SERTRALINE HYDROCHLORIDE 175 MG: 50 TABLET ORAL at 08:46

## 2020-01-01 RX ADMIN — TIOTROPIUM BROMIDE 18 MCG: 18 CAPSULE ORAL; RESPIRATORY (INHALATION) at 08:45

## 2020-01-01 RX ADMIN — TIOTROPIUM BROMIDE 18 MCG: 18 CAPSULE ORAL; RESPIRATORY (INHALATION) at 09:05

## 2020-01-01 RX ADMIN — CLOZAPINE 25 MG: 25 TABLET ORAL at 21:05

## 2020-01-01 RX ADMIN — CLOZAPINE 25 MG: 25 TABLET ORAL at 18:14

## 2020-01-01 RX ADMIN — BACITRACIN ZINC 1 SMALL APPLICATION: 500 OINTMENT TOPICAL at 08:12

## 2020-01-01 RX ADMIN — SUCRALFATE 1000 MG: 1 SUSPENSION ORAL at 06:27

## 2020-01-01 RX ADMIN — CLOZAPINE 25 MG: 25 TABLET ORAL at 21:17

## 2020-01-01 RX ADMIN — SUCRALFATE 1000 MG: 1 SUSPENSION ORAL at 07:14

## 2020-01-01 RX ADMIN — THEOPHYLLINE ANHYDROUS 200 MG: 200 CAPSULE, EXTENDED RELEASE ORAL at 08:50

## 2020-01-01 RX ADMIN — CLOZAPINE 50 MG: 25 TABLET ORAL at 21:20

## 2020-01-01 RX ADMIN — CLOZAPINE 25 MG: 25 TABLET ORAL at 16:44

## 2020-01-01 RX ADMIN — CLOZAPINE 25 MG: 25 TABLET ORAL at 17:38

## 2020-01-01 RX ADMIN — SERTRALINE HYDROCHLORIDE 175 MG: 50 TABLET ORAL at 08:06

## 2020-01-01 RX ADMIN — TIOTROPIUM BROMIDE 18 MCG: 18 CAPSULE ORAL; RESPIRATORY (INHALATION) at 09:26

## 2020-01-01 RX ADMIN — CARBAMIDE PEROXIDE 6.5% 5 DROP: 6.5 LIQUID AURICULAR (OTIC) at 08:52

## 2020-01-01 RX ADMIN — FLUTICASONE FUROATE AND VILANTEROL TRIFENATATE 1 PUFF: 200; 25 POWDER RESPIRATORY (INHALATION) at 08:10

## 2020-01-01 RX ADMIN — CLOZAPINE 25 MG: 25 TABLET ORAL at 17:03

## 2020-01-01 RX ADMIN — CLOZAPINE 25 MG: 25 TABLET ORAL at 17:01

## 2020-01-01 RX ADMIN — CLOZAPINE 50 MG: 25 TABLET ORAL at 17:29

## 2020-01-01 RX ADMIN — THEOPHYLLINE ANHYDROUS 200 MG: 200 CAPSULE, EXTENDED RELEASE ORAL at 08:55

## 2020-01-01 RX ADMIN — CLOZAPINE 25 MG: 25 TABLET ORAL at 16:29

## 2020-01-01 RX ADMIN — CARBAMIDE PEROXIDE 6.5% 5 DROP: 6.5 LIQUID AURICULAR (OTIC) at 08:24

## 2020-01-01 RX ADMIN — SERTRALINE HYDROCHLORIDE 200 MG: 50 TABLET ORAL at 08:54

## 2020-01-01 RX ADMIN — FLUTICASONE FUROATE AND VILANTEROL TRIFENATATE 1 PUFF: 200; 25 POWDER RESPIRATORY (INHALATION) at 09:04

## 2020-01-01 RX ADMIN — CLOZAPINE 25 MG: 25 TABLET ORAL at 21:11

## 2020-01-01 RX ADMIN — CLOZAPINE 25 MG: 25 TABLET ORAL at 20:40

## 2020-01-01 RX ADMIN — FLUTICASONE FUROATE AND VILANTEROL TRIFENATATE 1 PUFF: 200; 25 POWDER RESPIRATORY (INHALATION) at 09:07

## 2020-01-01 RX ADMIN — FLUTICASONE FUROATE AND VILANTEROL TRIFENATATE 1 PUFF: 200; 25 POWDER RESPIRATORY (INHALATION) at 08:36

## 2020-01-01 RX ADMIN — SERTRALINE HYDROCHLORIDE 50 MG: 50 TABLET ORAL at 21:35

## 2020-01-01 RX ADMIN — SERTRALINE HYDROCHLORIDE 150 MG: 50 TABLET ORAL at 08:22

## 2020-01-01 RX ADMIN — CLOZAPINE 50 MG: 25 TABLET ORAL at 17:33

## 2020-01-01 RX ADMIN — FLUTICASONE FUROATE AND VILANTEROL TRIFENATATE 1 PUFF: 200; 25 POWDER RESPIRATORY (INHALATION) at 08:06

## 2020-01-01 RX ADMIN — CLOZAPINE 25 MG: 25 TABLET ORAL at 20:44

## 2020-01-01 RX ADMIN — CARBAMIDE PEROXIDE 6.5% 5 DROP: 6.5 LIQUID AURICULAR (OTIC) at 21:26

## 2020-01-01 RX ADMIN — CARBAMIDE PEROXIDE 6.5% 5 DROP: 6.5 LIQUID AURICULAR (OTIC) at 21:58

## 2020-01-01 RX ADMIN — SERTRALINE HYDROCHLORIDE 200 MG: 50 TABLET ORAL at 08:21

## 2020-01-01 RX ADMIN — SERTRALINE HYDROCHLORIDE 200 MG: 50 TABLET ORAL at 08:57

## 2020-01-01 RX ADMIN — CLOZAPINE 50 MG: 25 TABLET ORAL at 17:23

## 2020-01-01 RX ADMIN — SERTRALINE HYDROCHLORIDE 200 MG: 50 TABLET ORAL at 08:22

## 2020-01-01 RX ADMIN — FLUTICASONE FUROATE AND VILANTEROL TRIFENATATE 1 PUFF: 200; 25 POWDER RESPIRATORY (INHALATION) at 08:16

## 2020-01-01 RX ADMIN — SERTRALINE HYDROCHLORIDE 175 MG: 50 TABLET ORAL at 08:41

## 2020-01-01 RX ADMIN — FLUTICASONE FUROATE AND VILANTEROL TRIFENATATE 1 PUFF: 200; 25 POWDER RESPIRATORY (INHALATION) at 08:30

## 2020-01-01 RX ADMIN — TIOTROPIUM BROMIDE 18 MCG: 18 CAPSULE ORAL; RESPIRATORY (INHALATION) at 08:24

## 2020-01-01 RX ADMIN — BACITRACIN ZINC 1 SMALL APPLICATION: 500 OINTMENT TOPICAL at 17:18

## 2020-01-01 RX ADMIN — FLUTICASONE FUROATE AND VILANTEROL TRIFENATATE 1 PUFF: 200; 25 POWDER RESPIRATORY (INHALATION) at 08:03

## 2020-01-01 RX ADMIN — LEVOTHYROXINE SODIUM 125 MCG: 125 TABLET ORAL at 05:58

## 2020-01-01 RX ADMIN — SERTRALINE HYDROCHLORIDE 200 MG: 50 TABLET ORAL at 09:30

## 2020-01-01 RX ADMIN — SERTRALINE HYDROCHLORIDE 50 MG: 50 TABLET ORAL at 08:49

## 2020-01-01 RX ADMIN — CLOZAPINE 50 MG: 25 TABLET ORAL at 18:03

## 2020-01-01 RX ADMIN — SUCRALFATE 1000 MG: 1 SUSPENSION ORAL at 06:42

## 2020-01-01 RX ADMIN — SERTRALINE HYDROCHLORIDE 50 MG: 50 TABLET ORAL at 21:06

## 2020-01-01 RX ADMIN — SERTRALINE HYDROCHLORIDE 200 MG: 50 TABLET ORAL at 08:33

## 2020-01-01 RX ADMIN — THEOPHYLLINE ANHYDROUS 200 MG: 200 CAPSULE, EXTENDED RELEASE ORAL at 08:19

## 2020-01-01 RX ADMIN — CLOZAPINE 50 MG: 25 TABLET ORAL at 21:24

## 2020-01-01 RX ADMIN — SUCRALFATE 1000 MG: 1 SUSPENSION ORAL at 06:34

## 2020-01-01 RX ADMIN — SERTRALINE HYDROCHLORIDE 200 MG: 50 TABLET ORAL at 08:51

## 2020-01-01 RX ADMIN — SERTRALINE HYDROCHLORIDE 200 MG: 50 TABLET ORAL at 08:48

## 2020-01-01 RX ADMIN — CARBAMIDE PEROXIDE 6.5% 5 DROP: 6.5 LIQUID AURICULAR (OTIC) at 19:20

## 2020-01-01 RX ADMIN — SERTRALINE HYDROCHLORIDE 150 MG: 50 TABLET ORAL at 09:12

## 2020-01-01 RX ADMIN — CLOZAPINE 25 MG: 25 TABLET ORAL at 20:33

## 2020-01-01 RX ADMIN — CLOZAPINE 25 MG: 25 TABLET ORAL at 21:22

## 2020-01-01 RX ADMIN — CLOZAPINE 25 MG: 25 TABLET ORAL at 21:12

## 2020-01-01 RX ADMIN — BACITRACIN ZINC 1 SMALL APPLICATION: 500 OINTMENT TOPICAL at 08:28

## 2020-01-01 RX ADMIN — SUCRALFATE 1000 MG: 1 SUSPENSION ORAL at 06:16

## 2020-01-01 RX ADMIN — SERTRALINE HYDROCHLORIDE 175 MG: 50 TABLET ORAL at 09:11

## 2020-01-01 RX ADMIN — SUCRALFATE 1000 MG: 1 SUSPENSION ORAL at 16:33

## 2020-01-01 RX ADMIN — SERTRALINE HYDROCHLORIDE 175 MG: 50 TABLET ORAL at 08:04

## 2020-01-01 RX ADMIN — THEOPHYLLINE ANHYDROUS 200 MG: 200 CAPSULE, EXTENDED RELEASE ORAL at 09:04

## 2020-01-01 RX ADMIN — TIOTROPIUM BROMIDE 18 MCG: 18 CAPSULE ORAL; RESPIRATORY (INHALATION) at 10:15

## 2020-01-01 RX ADMIN — CLOZAPINE 25 MG: 25 TABLET ORAL at 20:47

## 2020-01-01 RX ADMIN — FLUTICASONE FUROATE AND VILANTEROL TRIFENATATE 1 PUFF: 200; 25 POWDER RESPIRATORY (INHALATION) at 09:05

## 2020-01-01 RX ADMIN — PANTOPRAZOLE SODIUM 40 MG: 40 TABLET, DELAYED RELEASE ORAL at 06:19

## 2020-01-01 RX ADMIN — CLOZAPINE 50 MG: 25 TABLET ORAL at 17:05

## 2020-01-01 RX ADMIN — FLUTICASONE FUROATE AND VILANTEROL TRIFENATATE 1 PUFF: 200; 25 POWDER RESPIRATORY (INHALATION) at 08:05

## 2020-01-01 RX ADMIN — CARBAMIDE PEROXIDE 6.5% 5 DROP: 6.5 LIQUID AURICULAR (OTIC) at 21:48

## 2020-01-01 RX ADMIN — CLOZAPINE 50 MG: 25 TABLET ORAL at 20:24

## 2020-01-01 RX ADMIN — CLOZAPINE 25 MG: 25 TABLET ORAL at 21:36

## 2020-01-01 RX ADMIN — LEVOTHYROXINE SODIUM 125 MCG: 125 TABLET ORAL at 05:55

## 2020-01-01 RX ADMIN — TIOTROPIUM BROMIDE 18 MCG: 18 CAPSULE ORAL; RESPIRATORY (INHALATION) at 08:50

## 2020-01-01 RX ADMIN — SERTRALINE HYDROCHLORIDE 200 MG: 50 TABLET ORAL at 08:50

## 2020-01-01 RX ADMIN — FLUTICASONE FUROATE AND VILANTEROL TRIFENATATE 1 PUFF: 200; 25 POWDER RESPIRATORY (INHALATION) at 09:06

## 2020-01-01 RX ADMIN — TIOTROPIUM BROMIDE 18 MCG: 18 CAPSULE ORAL; RESPIRATORY (INHALATION) at 09:44

## 2020-01-01 RX ADMIN — CARBAMIDE PEROXIDE 6.5% 5 DROP: 6.5 LIQUID AURICULAR (OTIC) at 08:32

## 2020-01-01 RX ADMIN — SERTRALINE HYDROCHLORIDE 175 MG: 50 TABLET ORAL at 08:44

## 2020-01-01 RX ADMIN — CARBAMIDE PEROXIDE 6.5% 5 DROP: 6.5 LIQUID AURICULAR (OTIC) at 21:35

## 2020-01-01 RX ADMIN — SUCRALFATE 1000 MG: 1 SUSPENSION ORAL at 16:51

## 2020-01-01 RX ADMIN — TIOTROPIUM BROMIDE 18 MCG: 18 CAPSULE ORAL; RESPIRATORY (INHALATION) at 09:20

## 2020-01-01 RX ADMIN — FLUTICASONE FUROATE AND VILANTEROL TRIFENATATE 1 PUFF: 200; 25 POWDER RESPIRATORY (INHALATION) at 09:30

## 2020-01-01 RX ADMIN — SUCRALFATE 1000 MG: 1 SUSPENSION ORAL at 15:50

## 2020-01-01 RX ADMIN — SERTRALINE HYDROCHLORIDE 175 MG: 50 TABLET ORAL at 08:51

## 2020-01-01 RX ADMIN — FLUTICASONE FUROATE AND VILANTEROL TRIFENATATE 1 PUFF: 200; 25 POWDER RESPIRATORY (INHALATION) at 08:50

## 2020-01-01 RX ADMIN — FLUTICASONE FUROATE AND VILANTEROL TRIFENATATE 1 PUFF: 200; 25 POWDER RESPIRATORY (INHALATION) at 08:39

## 2020-01-01 RX ADMIN — PANTOPRAZOLE SODIUM 40 MG: 40 TABLET, DELAYED RELEASE ORAL at 05:55

## 2020-01-01 RX ADMIN — CARBAMIDE PEROXIDE 6.5% 5 DROP: 6.5 LIQUID AURICULAR (OTIC) at 08:36

## 2020-01-01 RX ADMIN — SERTRALINE HYDROCHLORIDE 175 MG: 50 TABLET ORAL at 09:12

## 2020-01-01 RX ADMIN — PANTOPRAZOLE SODIUM 40 MG: 40 TABLET, DELAYED RELEASE ORAL at 06:15

## 2020-01-01 RX ADMIN — LEVOTHYROXINE SODIUM 125 MCG: 125 TABLET ORAL at 05:44

## 2020-01-01 RX ADMIN — CLOZAPINE 50 MG: 25 TABLET ORAL at 16:58

## 2020-01-01 RX ADMIN — CLOZAPINE 50 MG: 25 TABLET ORAL at 17:36

## 2020-01-01 RX ADMIN — CLOZAPINE 50 MG: 25 TABLET ORAL at 16:45

## 2020-01-01 RX ADMIN — PANTOPRAZOLE SODIUM 40 MG: 40 TABLET, DELAYED RELEASE ORAL at 05:53

## 2020-01-01 RX ADMIN — SUCRALFATE 1000 MG: 1 SUSPENSION ORAL at 16:43

## 2020-01-01 RX ADMIN — CLOZAPINE 25 MG: 25 TABLET ORAL at 21:13

## 2020-01-01 RX ADMIN — SUCRALFATE 1000 MG: 1 SUSPENSION ORAL at 16:14

## 2020-01-01 RX ADMIN — CLOZAPINE 25 MG: 25 TABLET ORAL at 17:37

## 2020-01-01 RX ADMIN — THEOPHYLLINE ANHYDROUS 200 MG: 200 CAPSULE, EXTENDED RELEASE ORAL at 08:33

## 2020-01-01 RX ADMIN — SERTRALINE HYDROCHLORIDE 150 MG: 50 TABLET ORAL at 08:26

## 2020-01-01 RX ADMIN — CLOZAPINE 25 MG: 25 TABLET ORAL at 17:29

## 2020-01-01 RX ADMIN — BACITRACIN ZINC 1 SMALL APPLICATION: 500 OINTMENT TOPICAL at 08:15

## 2020-01-01 RX ADMIN — LEVOTHYROXINE SODIUM 125 MCG: 125 TABLET ORAL at 05:59

## 2020-01-01 RX ADMIN — THEOPHYLLINE ANHYDROUS 200 MG: 200 CAPSULE, EXTENDED RELEASE ORAL at 09:27

## 2020-01-01 RX ADMIN — SERTRALINE HYDROCHLORIDE 50 MG: 50 TABLET ORAL at 20:50

## 2020-01-01 RX ADMIN — TIOTROPIUM BROMIDE 18 MCG: 18 CAPSULE ORAL; RESPIRATORY (INHALATION) at 08:41

## 2020-01-01 RX ADMIN — TIOTROPIUM BROMIDE 18 MCG: 18 CAPSULE ORAL; RESPIRATORY (INHALATION) at 08:25

## 2020-01-01 RX ADMIN — CLOZAPINE 50 MG: 25 TABLET ORAL at 21:45

## 2020-01-01 RX ADMIN — SERTRALINE HYDROCHLORIDE 200 MG: 50 TABLET ORAL at 08:38

## 2020-01-01 RX ADMIN — FLUTICASONE FUROATE AND VILANTEROL TRIFENATATE 1 PUFF: 200; 25 POWDER RESPIRATORY (INHALATION) at 09:16

## 2020-01-01 RX ADMIN — SUCRALFATE 1000 MG: 1 SUSPENSION ORAL at 16:41

## 2020-01-01 RX ADMIN — CLOZAPINE 50 MG: 25 TABLET ORAL at 18:14

## 2020-01-01 RX ADMIN — SERTRALINE HYDROCHLORIDE 150 MG: 50 TABLET ORAL at 08:42

## 2020-01-01 RX ADMIN — CARBAMIDE PEROXIDE 6.5% 5 DROP: 6.5 LIQUID AURICULAR (OTIC) at 21:18

## 2020-01-01 RX ADMIN — CLOZAPINE 50 MG: 25 TABLET ORAL at 21:40

## 2020-01-01 RX ADMIN — CLOZAPINE 25 MG: 25 TABLET ORAL at 21:27

## 2020-01-01 RX ADMIN — CLOZAPINE 25 MG: 25 TABLET ORAL at 15:58

## 2020-01-01 RX ADMIN — SERTRALINE HYDROCHLORIDE 175 MG: 50 TABLET ORAL at 08:55

## 2020-01-01 RX ADMIN — CLOZAPINE 50 MG: 25 TABLET ORAL at 21:59

## 2020-01-01 RX ADMIN — LEVOTHYROXINE SODIUM 125 MCG: 125 TABLET ORAL at 06:27

## 2020-01-01 RX ADMIN — TIOTROPIUM BROMIDE 18 MCG: 18 CAPSULE ORAL; RESPIRATORY (INHALATION) at 09:08

## 2020-01-01 RX ADMIN — THEOPHYLLINE ANHYDROUS 200 MG: 200 CAPSULE, EXTENDED RELEASE ORAL at 08:36

## 2020-01-01 RX ADMIN — CARBAMIDE PEROXIDE 6.5% 5 DROP: 6.5 LIQUID AURICULAR (OTIC) at 08:23

## 2020-01-01 RX ADMIN — CLOZAPINE 25 MG: 25 TABLET ORAL at 17:23

## 2020-01-01 RX ADMIN — SERTRALINE HYDROCHLORIDE 150 MG: 50 TABLET ORAL at 09:02

## 2020-01-01 RX ADMIN — CLOZAPINE 50 MG: 25 TABLET ORAL at 20:43

## 2020-01-01 RX ADMIN — TIOTROPIUM BROMIDE 18 MCG: 18 CAPSULE ORAL; RESPIRATORY (INHALATION) at 08:03

## 2020-01-01 RX ADMIN — CARBAMIDE PEROXIDE 6.5% 5 DROP: 6.5 LIQUID AURICULAR (OTIC) at 09:08

## 2020-01-01 RX ADMIN — CLOZAPINE 50 MG: 25 TABLET ORAL at 21:02

## 2020-01-01 RX ADMIN — BACITRACIN ZINC 1 SMALL APPLICATION: 500 OINTMENT TOPICAL at 08:53

## 2020-01-01 RX ADMIN — CLOZAPINE 50 MG: 25 TABLET ORAL at 17:26

## 2020-01-01 RX ADMIN — THEOPHYLLINE ANHYDROUS 200 MG: 200 CAPSULE, EXTENDED RELEASE ORAL at 09:30

## 2020-01-01 RX ADMIN — TIOTROPIUM BROMIDE 18 MCG: 18 CAPSULE ORAL; RESPIRATORY (INHALATION) at 09:12

## 2020-01-01 RX ADMIN — SERTRALINE HYDROCHLORIDE 175 MG: 50 TABLET ORAL at 08:43

## 2020-01-01 RX ADMIN — CLOZAPINE 25 MG: 25 TABLET ORAL at 17:44

## 2020-01-01 RX ADMIN — LEVOTHYROXINE SODIUM 125 MCG: 125 TABLET ORAL at 06:34

## 2020-01-01 RX ADMIN — FLUTICASONE FUROATE AND VILANTEROL TRIFENATATE 1 PUFF: 200; 25 POWDER RESPIRATORY (INHALATION) at 09:15

## 2020-01-01 RX ADMIN — CLOZAPINE 25 MG: 25 TABLET ORAL at 20:31

## 2020-01-01 RX ADMIN — CLOZAPINE 25 MG: 25 TABLET ORAL at 21:08

## 2020-01-01 RX ADMIN — SERTRALINE HYDROCHLORIDE 50 MG: 50 TABLET ORAL at 09:03

## 2020-01-01 RX ADMIN — CLOZAPINE 50 MG: 25 TABLET ORAL at 21:21

## 2020-01-01 RX ADMIN — SERTRALINE HYDROCHLORIDE 175 MG: 50 TABLET ORAL at 09:04

## 2020-01-01 RX ADMIN — TIOTROPIUM BROMIDE 18 MCG: 18 CAPSULE ORAL; RESPIRATORY (INHALATION) at 08:08

## 2020-01-01 RX ADMIN — CLOZAPINE 50 MG: 25 TABLET ORAL at 18:05

## 2020-01-01 RX ADMIN — SERTRALINE HYDROCHLORIDE 150 MG: 50 TABLET ORAL at 09:00

## 2020-01-01 RX ADMIN — CLOZAPINE 50 MG: 25 TABLET ORAL at 17:27

## 2020-01-01 RX ADMIN — TIOTROPIUM BROMIDE 18 MCG: 18 CAPSULE ORAL; RESPIRATORY (INHALATION) at 08:59

## 2020-01-01 RX ADMIN — LEVOTHYROXINE SODIUM 125 MCG: 125 TABLET ORAL at 06:16

## 2020-01-01 RX ADMIN — CLOZAPINE 25 MG: 25 TABLET ORAL at 21:31

## 2020-01-01 RX ADMIN — CLOZAPINE 25 MG: 25 TABLET ORAL at 21:23

## 2020-01-01 RX ADMIN — SERTRALINE HYDROCHLORIDE 50 MG: 50 TABLET ORAL at 08:42

## 2020-01-01 RX ADMIN — SERTRALINE HYDROCHLORIDE 150 MG: 50 TABLET ORAL at 09:08

## 2020-01-01 RX ADMIN — FLUTICASONE FUROATE AND VILANTEROL TRIFENATATE 1 PUFF: 200; 25 POWDER RESPIRATORY (INHALATION) at 09:14

## 2020-01-01 RX ADMIN — TIOTROPIUM BROMIDE 18 MCG: 18 CAPSULE ORAL; RESPIRATORY (INHALATION) at 08:04

## 2020-01-01 RX ADMIN — SERTRALINE HYDROCHLORIDE 200 MG: 50 TABLET ORAL at 08:27

## 2020-01-01 RX ADMIN — SERTRALINE HYDROCHLORIDE 175 MG: 50 TABLET ORAL at 08:29

## 2020-01-01 RX ADMIN — PANTOPRAZOLE SODIUM 40 MG: 40 TABLET, DELAYED RELEASE ORAL at 06:20

## 2020-01-01 RX ADMIN — SERTRALINE HYDROCHLORIDE 150 MG: 50 TABLET ORAL at 08:30

## 2020-01-01 RX ADMIN — CARBAMIDE PEROXIDE 6.5% 5 DROP: 6.5 LIQUID AURICULAR (OTIC) at 09:36

## 2020-01-01 RX ADMIN — THEOPHYLLINE ANHYDROUS 200 MG: 200 CAPSULE, EXTENDED RELEASE ORAL at 09:15

## 2020-01-01 RX ADMIN — SUCRALFATE 1000 MG: 1 SUSPENSION ORAL at 16:27

## 2020-01-01 RX ADMIN — FLUTICASONE FUROATE AND VILANTEROL TRIFENATATE 1 PUFF: 200; 25 POWDER RESPIRATORY (INHALATION) at 08:24

## 2020-01-01 RX ADMIN — SERTRALINE HYDROCHLORIDE 50 MG: 50 TABLET ORAL at 08:54

## 2020-01-01 RX ADMIN — SERTRALINE HYDROCHLORIDE 200 MG: 50 TABLET ORAL at 08:41

## 2020-01-01 RX ADMIN — CLOZAPINE 25 MG: 25 TABLET ORAL at 20:43

## 2020-01-01 RX ADMIN — FLUTICASONE FUROATE AND VILANTEROL TRIFENATATE 1 PUFF: 200; 25 POWDER RESPIRATORY (INHALATION) at 09:10

## 2020-01-01 RX ADMIN — CLOZAPINE 25 MG: 25 TABLET ORAL at 17:27

## 2020-01-01 RX ADMIN — TIOTROPIUM BROMIDE 18 MCG: 18 CAPSULE ORAL; RESPIRATORY (INHALATION) at 09:30

## 2020-01-01 RX ADMIN — CLOZAPINE 50 MG: 25 TABLET ORAL at 16:25

## 2020-01-01 RX ADMIN — TIOTROPIUM BROMIDE 18 MCG: 18 CAPSULE ORAL; RESPIRATORY (INHALATION) at 08:46

## 2020-01-01 RX ADMIN — THEOPHYLLINE ANHYDROUS 200 MG: 200 CAPSULE, EXTENDED RELEASE ORAL at 08:35

## 2020-01-01 RX ADMIN — CLOZAPINE 50 MG: 25 TABLET ORAL at 16:43

## 2020-01-01 RX ADMIN — CLOZAPINE 25 MG: 25 TABLET ORAL at 18:04

## 2020-01-01 RX ADMIN — FLUTICASONE FUROATE AND VILANTEROL TRIFENATATE 1 PUFF: 200; 25 POWDER RESPIRATORY (INHALATION) at 09:34

## 2020-01-01 RX ADMIN — CLOZAPINE 25 MG: 25 TABLET ORAL at 17:21

## 2020-01-01 RX ADMIN — CLOZAPINE 50 MG: 25 TABLET ORAL at 16:29

## 2020-01-01 RX ADMIN — PANTOPRAZOLE SODIUM 40 MG: 40 TABLET, DELAYED RELEASE ORAL at 05:58

## 2020-01-01 RX ADMIN — MONTELUKAST SODIUM 10 MG: 10 TABLET, COATED ORAL at 21:06

## 2020-01-01 RX ADMIN — THEOPHYLLINE ANHYDROUS 200 MG: 200 CAPSULE, EXTENDED RELEASE ORAL at 08:31

## 2020-01-01 RX ADMIN — SERTRALINE HYDROCHLORIDE 200 MG: 50 TABLET ORAL at 09:03

## 2020-01-01 RX ADMIN — SERTRALINE HYDROCHLORIDE 50 MG: 50 TABLET ORAL at 21:58

## 2020-01-01 RX ADMIN — TIOTROPIUM BROMIDE 18 MCG: 18 CAPSULE ORAL; RESPIRATORY (INHALATION) at 09:13

## 2020-01-01 RX ADMIN — SUCRALFATE 1000 MG: 1 SUSPENSION ORAL at 15:51

## 2020-01-01 RX ADMIN — CLOZAPINE 50 MG: 25 TABLET ORAL at 16:02

## 2020-01-01 RX ADMIN — CARBAMIDE PEROXIDE 6.5% 5 DROP: 6.5 LIQUID AURICULAR (OTIC) at 22:01

## 2020-01-01 RX ADMIN — CLOZAPINE 25 MG: 25 TABLET ORAL at 18:11

## 2020-01-01 RX ADMIN — CLOZAPINE 25 MG: 25 TABLET ORAL at 16:02

## 2020-01-01 RX ADMIN — SERTRALINE HYDROCHLORIDE 200 MG: 50 TABLET ORAL at 09:07

## 2020-01-01 RX ADMIN — THEOPHYLLINE ANHYDROUS 200 MG: 200 CAPSULE, EXTENDED RELEASE ORAL at 09:10

## 2020-01-01 NOTE — PLAN OF CARE
Problem: Alteration in Thoughts and Perception  Goal: Agree to be compliant with medication regime, as prescribed and report medication side effects  Description  Interventions:  - Offer appropriate PRN medication and supervise ingestion; conduct aims, as needed   Outcome: Progressing  Goal: Complete daily ADLs, including personal hygiene independently, as able  Description  Interventions:  - Observe, teach, and assist patient with ADLS  - Monitor and promote a balance of rest/activity, with adequate nutrition and elimination   Outcome: Progressing     Problem: Alteration in Thoughts and Perception  Goal: Attend and participate in unit activities, including therapeutic, recreational, and educational groups  Description  Interventions:  - Provide therapeutic and educational activities daily, encourage attendance and participation, and document same in the medical record     CERTIFIED PEER SPECIALIST INTERVENTIONS:    Complete peer assessment with patient to assess their needs and identify their goals to complete while in the recovery program as well as once discharged into the community  Patient will complete WRAP Plan, Crisis Plan and 5 Life Domains  Patient will attend 50% of groups offered on the unit  Patient will complete a goal card weekly  Outcome: Not Progressing     Problem: Depression  Goal: Refrain from isolation  Description  Interventions:  - Develop a trusting relationship   - Encourage socialization   Outcome: Not Progressing     [address] was willing to attend to shower, grooming w/setup, assistance from the MHT for her hair  Saying both tired & relaxed afterwards & isolative to room & bed much of remainder of shift  Ate 100% of meal (egg salad, milk, juice, Ensure) & had HS snack of cookies, 2 cheese sticks, potato chips & milk  Did not attend PM Group as resting in bed   Did reluctantly use the Incentive Spirometer, reaching volumes of 1100-1250ml; struggled a little more w/it as said tired from shower  Was medicine compliant except the Singulair & Debrox gtts  Pleasant  Wearing now her QHS humidified nasal O2 @ 1L for bed

## 2020-01-01 NOTE — PLAN OF CARE
Problem: Alteration in Thoughts and Perception  Goal: Agree to be compliant with medication regime, as prescribed and report medication side effects  Description  Interventions:  - Offer appropriate PRN medication and supervise ingestion; conduct aims, as needed   Outcome: Progressing     Problem: Ineffective Coping  Goal: Patient/Family verbalizes awareness of resources  Outcome: Progressing  Goal: Understands least restrictive measures  Description  Interventions:  - Utilize least restrictive behavior  Outcome: Progressing     Problem: Risk for Self Injury/Neglect  Goal: Treatment Goal: Remain safe during length of stay, learn and adopt new coping skills, and be free of self-injurious ideation, impulses and acts at the time of discharge  Outcome: Progressing  Goal: Refrain from harming self  Description  Interventions:  - Monitor patient closely, per order  - Develop a trusting relationship  - Supervise medication ingestion, monitor effects and side effects   Outcome: Progressing     Problem: Alteration in Orientation  Goal: Interact with staff daily  Description  Interventions:  - Assess and re-assess patient's level of orientation  - Engage patient in 1 on 1 interactions, daily, for a minimum of 15 minutes   - Establish rapport/trust with patient   Outcome: Progressing     Problem: PAIN - ADULT  Goal: Verbalizes/displays adequate comfort level or baseline comfort level  Description  Interventions:  - Encourage patient to monitor pain and request assistance  - Assess pain using appropriate pain scale  - Administer analgesics based on type and severity of pain and evaluate response  - Implement non-pharmacological measures as appropriate and evaluate response  - Consider cultural and social influences on pain and pain management  - Notify physician/advanced practitioner if interventions unsuccessful or patient reports new pain  Outcome: Progressing     Problem: SAFETY ADULT  Goal: Patient will remain free of falls  Description  INTERVENTIONS:  - Assess patient frequently for physical needs  -  Identify cognitive and physical deficits and behaviors that affect risk of falls  -  [place] fall precautions as indicated by assessment   - Educate patient/family on patient safety including physical limitations  - Instruct patient to call for assistance with activity based on assessment  - Modify environment to reduce risk of injury  - Consider OT/PT consult to assist with strengthening/mobility  Outcome: Progressing     Problem: Nutrition/Hydration-ADULT  Goal: Nutrient/Hydration intake appropriate for improving, restoring or maintaining nutritional needs  Description  Monitor and assess patient's nutrition/hydration status for malnutrition  Collaborate with interdisciplinary team and initiate plan and interventions as ordered  Monitor patient's weight and dietary intake as ordered or per policy  Utilize nutrition screening tool and intervene as necessary  Determine patient's food preferences and provide high-protein, high-caloric foods as appropriate       INTERVENTIONS:  - Monitor oral intake, urinary output, labs, and treatment plans  - Assess nutrition and hydration status and recommend course of action  - Evaluate amount of meals eaten  - Assist patient with eating if necessary   - Allow adequate time for meals  - Recommend/ encourage appropriate diets, oral nutritional supplements, and vitamin/mineral supplements  - Order, calculate, and assess calorie counts as needed  - Recommend, monitor, and adjust tube feedings and TPN/PPN based on assessed needs  - Assess need for intravenous fluids  - Provide specific nutrition/hydration education as appropriate  - Include patient/family/caregiver in decisions related to nutrition  Outcome: Progressing     Problem: Depression  Goal: Refrain from isolation  Description  Interventions:  - Develop a trusting relationship   - Encourage socialization   Outcome: Not Progressing   Out for meds and meals, otherwise, spent the day in bed napping  Minimal interactions with others, guarded and superficial  No somatic complaints or delusions voiced this shift  Ate 25% of breakfast consisting of her cereal and yogurt and ate 75% of lunch consisting of pork and sauerkraut  Med compliant  Continue to monitor

## 2020-01-01 NOTE — PLAN OF CARE
Problem: Alteration in Thoughts and Perception  Goal: Agree to be compliant with medication regime, as prescribed and report medication side effects  Description  Interventions:  - Offer appropriate PRN medication and supervise ingestion; conduct aims, as needed   Outcome: Progressing  Goal: Complete daily ADLs, including personal hygiene independently, as able  Description  Interventions:  - Observe, teach, and assist patient with ADLS  - Monitor and promote a balance of rest/activity, with adequate nutrition and elimination   Outcome: Progressing     Problem: Risk for Self Injury/Neglect  Goal: Verbalize thoughts and feelings  Description  Interventions:  - Assess and re-assess patient's lethality and potential for self-injury  - Engage patient in 1:1 interactions, daily, for a minimum of 15 minutes  - Encourage patient to express feelings, fears, frustrations, hopes  - Establish rapport/trust with patient   Outcome: Progressing  Goal: Attend and participate in unit activities, including therapeutic, recreational, and educational groups  Description  Interventions:  - Provide therapeutic and educational activities daily, encourage attendance and participation, and document same in the medical record  - Obtain collateral information, encourage visitation and family involvement in care   Outcome: Progressing     Problem: Alteration in Orientation  Goal: Interact with staff daily  Description  Interventions:  - Assess and re-assess patient's level of orientation  - Engage patient in 1 on 1 interactions, daily, for a minimum of 15 minutes   - Establish rapport/trust with patient   Outcome: Progressing     Problem: SAFETY ADULT  Goal: Patient will remain free of falls  Description  INTERVENTIONS:  - Assess patient frequently for physical needs  -  Identify cognitive and physical deficits and behaviors that affect risk of falls    -  [place] fall precautions as indicated by assessment   - Educate patient/family on patient safety including physical limitations  - Instruct patient to call for assistance with activity based on assessment  - Modify environment to reduce risk of injury  - Consider OT/PT consult to assist with strengthening/mobility  Outcome: Progressing     Problem: RESPIRATORY - ADULT  Goal: Achieves optimal ventilation and oxygenation  Description  INTERVENTIONS:  - Assess for changes in respiratory status  - Assess for changes in mentation and behavior  - Position to facilitate oxygenation and minimize respiratory effort  - Oxygen administration by appropriate delivery method based on oxygen saturation (per order) or ABGs  - Initiate smoking cessation education as indicated  - Encourage broncho-pulmonary hygiene including cough, deep breathe, Incentive Spirometry  - Assess the need for suctioning and aspirate as needed  - Assess and instruct to report SOB or any respiratory difficulty  - Respiratory Therapy support as indicated  Outcome: Progressing     Mariana Hernandes was in her room at the beginning of the shift  Naps at times  No respiratory distress  Incentive spirometer encouraged and supervised while using it  She was able to do 10 breaths at 1250 ml's each time  Took medication with prompting  Ate 100% of supper and drank an Ensure  No shower or BM  Remained in the dining room for about an hour after eating  She was social with select peers and staff member  Attended evening group (played cards with peer)  Propmpted for medication  Ate snack  Oxygen 1 liter nasal cannula at   Continue to monitor  Precautions maintained

## 2020-01-01 NOTE — PLAN OF CARE
Problem: SLEEP DISTURBANCE  Goal: Will exhibit normal sleeping pattern  Description  Interventions:  -  Assess the patients sleep pattern, noting recent changes  - Administer medication as ordered  - Decrease environmental stimuli, including noise, as appropriate during the night  - Encourage the patient to actively participate in unit groups and or exercise during the day to enhance ability to achieve adequate sleep at night  - Assess the patient, in the morning, encouraging a description of sleep experience  Outcome: Red Paiz has been sleeping throughout the night  Respirations easy and non labored with oxygen 1 liter on during sleep  No issues or behaviors  Continue every 15 minute monitoring  Medication compliant  Precautions maintained

## 2020-01-01 NOTE — ASSESSMENT & PLAN NOTE
Psychiatry Progress Note  Patient is upset that respiratory did not feel that she needs portable oxygen when she goes of the unit and she is still refusing to venture out of the unit despite reminding her that staff can take him next door to the [place] which is only a few she away from the [facility] unit  She did not have any more complains of anxiety attack and she did take a shower yesterday after a week  However she has not voiced any concerns about her having cancer or dying from terrible illness  and still questions her medications sometimes  She is happy that she can still go back to the [facility] rather than to the [facility] if she keeps up with her improvement  for a few more weeks in a row by continuing to attend groups more than 50% and attending to her ADLs like taking showers twice a week but today she tells me she has not taken showers in a week and plans to take one later today as opposed to taking twice a week  She is still occasionally suspicious about certain staff tampering with her spirometer and oxygen concentrator off and on and again focussed on having a portable oxygen to be able togo out   She was again reminded  that she needs to show she can go off unit and off grounds first with staff before she can go off grounds with her sister and she verbalized understanding but is still insisting that she needs her portable oxygen      Current medications:    Current Facility-Administered Medications:     acetaminophen (TYLENOL) tablet 650 mg, 650 mg, Oral, Q6H PRN, Roberto Colorado MD    albuterol (PROVENTIL HFA,VENTOLIN HFA) inhaler 2 puff, 2 puff, Inhalation, Q4H PRN, Roberto Colorado MD, 2 puff at 10/11/19 0424    aluminum-magnesium hydroxide-simethicone (MYLANTA) 200-200-20 mg/5 mL oral suspension 15 mL, 15 mL, Oral, Q4H PRN, Roberto Colorado MD    ammonium lactate (LAC-HYDRIN) 12 % lotion 1 application, 1 application, Topical, BID PRN, Roberto Colorado MD    benzonatate (TESSALON PERLES) capsule 100 mg, 100 mg, Oral, TID PRN, Jaz Beasley MD    benztropine (COGENTIN) injection 1 mg, 1 mg, Intramuscular, Q8H PRN, Jaz Beasley MD    carbamide peroxide (DEBROX) 6 5 % otic solution 5 drop, 5 drop, Left Ear, BID, Rona Doran MD, 5 drop at 12/29/19 1800    cloZAPine (CLOZARIL) tablet 25 mg, 25 mg, Oral, BID, Jaz Beasley MD, 25 mg at 12/31/19 2152    cloZAPine (CLOZARIL) tablet 50 mg, 50 mg, Oral, BID, Jaz Beasley MD, 50 mg at 12/31/19 2153    EPINEPHrine PF (ADRENALIN) 1 mg/mL injection 0 15 mg, 0 15 mg, Intramuscular, Once PRN, Jaz Beasley MD    fluticasone-vilanterol (BREO ELLIPTA) 200-25 MCG/INH inhaler 1 puff, 1 puff, Inhalation, Daily, Jaz Beasley MD, 1 puff at 01/01/20 9451    ketotifen (ZADITOR) 0 025 % ophthalmic solution 1 drop, 1 drop, Right Eye, BID PRN, Jaz Beasley MD    levothyroxine tablet 125 mcg, 125 mcg, Oral, Early Morning, Jaz Beasley MD, 125 mcg at 01/01/20 0591    magnesium hydroxide (MILK OF MAGNESIA) 400 mg/5 mL oral suspension 30 mL, 30 mL, Oral, Daily PRN, Jaz Beasley MD    montelukast (SINGULAIR) tablet 10 mg, 10 mg, Oral, HS, Jaz Beasley MD, 10 mg at 12/24/19 2109    OLANZapine (ZyPREXA) IM injection 5 mg, 5 mg, Intramuscular, Q8H PRN, Jaz Beasley MD    OLANZapine (ZyPREXA) tablet 5 mg, 5 mg, Oral, Q8H PRN, Jaz Beasley MD    ondansetron (ZOFRAN-ODT) dispersible tablet 4 mg, 4 mg, Oral, Q6H PRN, Jaz Beasley MD, 4 mg at 12/09/19 1757    pantoprazole (PROTONIX) EC tablet 40 mg, 40 mg, Oral, Early Morning, Jaz Beasley MD, 40 mg at 01/01/20 5999    polyethylene glycol (MIRALAX) packet 17 g, 17 g, Oral, Daily PRN, Jaz Beasley MD    polyvinyl alcohol (LIQUIFILM TEARS) 1 4 % ophthalmic solution 1 drop, 1 drop, Both Eyes, Q3H PRN, Jaz Beasley MD    sertraline (ZOLOFT) tablet 50 mg, 50 mg, Oral, BID, Jaz Beasley MD, 50 mg at 01/01/20 5855    sucralfate (CARAFATE) oral suspension 1,000 mg, 1,000 mg, Oral, BID, Cat Torres MD, 1,000 mg at 01/01/20 0604    theophylline (JEF-24) 24 hr capsule 200 mg, 200 mg, Oral, Daily, Jeet Levine MD, 200 mg at 01/01/20 2699    tiotropium [facility]) capsule for inhaler 18 mcg, 18 mcg, Inhalation, Daily, Jeet Levine MD, 18 mcg at 01/01/20 9536    traZODone (DESYREL) tablet 25 mg, 25 mg, Oral, HS PRN, Jeet Levine MD  Justification if on more than two antipsychotics:  Only on his clozapine  Side effects if any:  None    Risks , benefits, side effects and precautions of medications discussed with patient and reminded patient to let us know any problems with medications     Objective:     Vital Signs:  Vitals:    12/31/19 0732 12/31/19 1620 12/31/19 1944 01/01/20 0730   BP: 100/63 111/72 90/55 107/70   BP Location: Left arm Left arm Right arm Left arm   Pulse: 71 80 67 85   Resp:  16 16 17   Temp:  (!) 97 3 °F (36 3 °C) 97 8 °F (36 6 °C) 97 9 °F (36 6 °C)   TempSrc:  Temporal Temporal Temporal   SpO2:  92% 90%    Weight:       Height:         Appearance:  age appropriate, casually dressed and overweight older than stated age, appears better groomed and combed had today   Behavior:  evasive and guarded with no psychosomatic complaints, friendly but argumentative    Speech:  normal pitch and normal volume but circumstantial and tangential with stilted speech    Mood:  anxious and dysthymic   Affect:  mood-congruent, elated and entitled anxious and irritated and sad at times but pleasant today    Thought Process:  goal directed and illogical slightly pressured and tangential and talks as if in a court of law   Thought Content:  No current suicidal homicidal thoughts intent or plans reported  No phobias obsessions or compulsions reported  Still accusatory  to certain staff off and on about time bring with her spirometer or her inhaler r    No overt delusions elicited today but still with some underlying psychosomatic complaints about not being able to breathe or swallow and preoccupied with need for portable oxygen which has not changed at all    Perceptual Disturbances: None and does not appear responding to internal stimuli    Risk Potential: Tendency to not care for herself    Sensorium:  person, place, time/date, situation, day of week, month of year and time   Cognition:  grossly intact with no deficits in recent or remote memory or immediate recall   Consciousness:  alert and awake    Attention: Intact concentration and attention span   Intellect: Considered to be at least of average intelligence   Insight:  limited but improving   Judgment: improving      Motor Activity: no abnormal movements         Recent Labs:  Results Reviewed     None          I/O Past 24 hours:  No intake/output data recorded  No intake/output data recorded  Assessment / Plan:     Schizoaffective disorder, bipolar [facility] ([facility]      Reason for continued inpatient care:  Because of underlying paranoia and inability to care for herself on her own and multiple somatic complaints  Acceptance by patient:  Accepting  Quinn Velásquez in recovery:  About living in same personal care home at the [place] once she feels better  Understanding of medications :  Has some understanding  Involved in reintegration process: On off unit privileges now  Trusting in relatoinship with psychiatrist:  Trusting    Recommended Treatment:    Medication changes:  1) none today  Non-pharmacological treatments  1) continue with  individual therapy group therapy, milieu therapy and occupational therapy and milieu therapy involving multidisciplinary team approach with psychotherapist, case management, nursing, peer support services, art therapist etc using recovery principles and psycho-education about accepting illness and need for treatment     2) encourage to cooperate with behavior plan  3) encourage to attend to ADLs like taking showers and wearing clean clothes   4) Encourage to attend groups   5) see how she does on off unit privileges and encourage to go off unit with staff escort  and expectations discussed with her and she did verbalize an understanding, consult respiratory to see if she needs portable oxygen and their findings are still pending  Safety  1) Safety/communication plan established targeting dynamic risk factors above  Discharge Plan most likely back to the a:  Personal care boarding home with act services once her delusions are managed and mood becomes more stabilized and she attends to her ADLs she becomes more receptive to treatment compliance but she has not shown any improvement in the last 5 months since she has been on the unit and hence the referral needs to be initiated for the  [address] [place] [address] / Coordination of Care    Total floor / unit time spent today 15 minutes  Greater than 50% of total time was spent with the patient and / or family counseling and / or coordination of care  A description of the counseling / coordination of care  Patient's Rights, confidentiality and exceptions to confidentiality, use of automated medical record, [facility] staff access to medical record, and consent to treatment reviewed      Meldon Curling, MD

## 2020-01-01 NOTE — PROGRESS NOTES
Progress Note - Pushpa Morgan 1955, 60 y o  female MRN: [id number]    Unit/Bed#: [facility] 110-02 Encounter: [id number]    Primary Care Provider: Amy Cameron PA-C   Date and time admitted to hospital: 7/23/2019  5:30 PM        * Schizoaffective disorder, bipolar type [facility])  Assessment & Plan  Psychiatry Progress Note  Patient is upset that respiratory did not feel that she needs portable oxygen when she goes of the unit and she is still refusing to venture out of the unit despite reminding her that staff can take him next door to the [place] which is only a few she away from the [facility] unit  She did not have any more complains of anxiety attack and she did take a shower yesterday after a week  However she has not voiced any concerns about her having cancer or dying from terrible illness  and still questions her medications sometimes  She is happy that she can still go back to the [facility] rather than to the [facility] if she keeps up with her improvement  for a few more weeks in a row by continuing to attend groups more than 50% and attending to her ADLs like taking showers twice a week but today she tells me she has not taken showers in a week and plans to take one later today as opposed to taking twice a week  She is still occasionally suspicious about certain staff tampering with her spirometer and oxygen concentrator off and on and again focussed on having a portable oxygen to be able togo out   She was again reminded  that she needs to show she can go off unit and off grounds first with staff before she can go off grounds with her sister and she verbalized understanding but is still insisting that she needs her portable oxygen      Current medications:    Current Facility-Administered Medications:     acetaminophen (TYLENOL) tablet 650 mg, 650 mg, Oral, Q6H PRN, Carissa Willis MD    albuterol (PROVENTIL HFA,VENTOLIN HFA) inhaler 2 puff, 2 puff, Inhalation, Q4H PRN, Carissa Willis MD, 2 puff at 10/11/19 0424    aluminum-magnesium hydroxide-simethicone (MYLANTA) 200-200-20 mg/5 mL oral suspension 15 mL, 15 mL, Oral, Q4H PRN, Tree Madden MD    ammonium lactate (LAC-HYDRIN) 12 % lotion 1 application, 1 application, Topical, BID PRN, Tree Madden MD    benzonatate (TESSALON PERLES) capsule 100 mg, 100 mg, Oral, TID PRN, Tree Madden MD    benztropine (COGENTIN) injection 1 mg, 1 mg, Intramuscular, Q8H PRN, Tree Madden MD    carbamide peroxide (DEBROX) 6 5 % otic solution 5 drop, 5 drop, Left Ear, BID, Rona Kaba MD, 5 drop at 12/29/19 1800    cloZAPine (CLOZARIL) tablet 25 mg, 25 mg, Oral, BID, Tree Madden MD, 25 mg at 12/31/19 2152    cloZAPine (CLOZARIL) tablet 50 mg, 50 mg, Oral, BID, Tree Madden MD, 50 mg at 12/31/19 2153    EPINEPHrine PF (ADRENALIN) 1 mg/mL injection 0 15 mg, 0 15 mg, Intramuscular, Once PRN, Tree Madden MD    fluticasone-vilanterol (BREO ELLIPTA) 200-25 MCG/INH inhaler 1 puff, 1 puff, Inhalation, Daily, Tree Madden MD, 1 puff at 01/01/20 6122    ketotifen (ZADITOR) 0 025 % ophthalmic solution 1 drop, 1 drop, Right Eye, BID PRN, Tree Madden MD    levothyroxine tablet 125 mcg, 125 mcg, Oral, Early Morning, Tree Madden MD, 125 mcg at 01/01/20 9405    magnesium hydroxide (MILK OF MAGNESIA) 400 mg/5 mL oral suspension 30 mL, 30 mL, Oral, Daily PRN, Tree Madden MD    montelukast (SINGULAIR) tablet 10 mg, 10 mg, Oral, HS, Tree Madden MD, 10 mg at 12/24/19 2109    OLANZapine (ZyPREXA) IM injection 5 mg, 5 mg, Intramuscular, Q8H PRN, Tree Madden MD    OLANZapine (ZyPREXA) tablet 5 mg, 5 mg, Oral, Q8H PRN, Tree Madden MD    ondansetron (ZOFRAN-ODT) dispersible tablet 4 mg, 4 mg, Oral, Q6H PRN, Tree Madden MD, 4 mg at 12/09/19 1754    pantoprazole (PROTONIX) EC tablet 40 mg, 40 mg, Oral, Early Morning, Tree Madden MD, 40 mg at 01/01/20 8528    polyethylene glycol (MIRALAX) packet 17 g, 17 g, Oral, Daily PRN, Tree Madden MD    polyvinyl alcohol (LIQUIFILM TEARS) 1 4 % ophthalmic solution 1 drop, 1 drop, Both Eyes, Q3H PRN, Tree Madden MD    sertraline (ZOLOFT) tablet 50 mg, 50 mg, Oral, BID, Tree Madden MD, 50 mg at 01/01/20 1725    sucralfate (CARAFATE) oral suspension 1,000 mg, 1,000 mg, Oral, BID, Sachin Mahoney MD, 1,000 mg at 01/01/20 0604    theophylline (JEF-24) 24 hr capsule 200 mg, 200 mg, Oral, Daily, Tree Madden MD, 200 mg at 01/01/20 4030    tiotropium [facility]) capsule for inhaler 18 mcg, 18 mcg, Inhalation, Daily, Tree Madden MD, 18 mcg at 01/01/20 5483    traZODone (DESYREL) tablet 25 mg, 25 mg, Oral, HS PRN, Tree Madden MD  Justification if on more than two antipsychotics:  Only on his clozapine  Side effects if any:  None    Risks , benefits, side effects and precautions of medications discussed with patient and reminded patient to let us know any problems with medications     Objective:     Vital Signs:  Vitals:    12/31/19 0732 12/31/19 1620 12/31/19 1944 01/01/20 0730   BP: 100/63 111/72 90/55 107/70   BP Location: Left arm Left arm Right arm Left arm   Pulse: 71 80 67 85   Resp:  16 16 17   Temp:  (!) 97 3 °F (36 3 °C) 97 8 °F (36 6 °C) 97 9 °F (36 6 °C)   TempSrc:  Temporal Temporal Temporal   SpO2:  92% 90%    Weight:       Height:         Appearance:  age appropriate, casually dressed and overweight older than stated age, appears better groomed and combed had today   Behavior:  evasive and guarded with no psychosomatic complaints, friendly but argumentative    Speech:  normal pitch and normal volume but circumstantial and tangential with stilted speech    Mood:  anxious and dysthymic   Affect:  mood-congruent, elated and entitled anxious and irritated and sad at times but pleasant today    Thought Process:  goal directed and illogical slightly pressured and tangential and talks as if in a court of law   Thought Content:  No current suicidal homicidal thoughts intent or plans reported    No phobias obsessions or compulsions reported  Still accusatory  to certain staff off and on about time bring with her spirometer or her inhaler r  No overt delusions elicited today but still with some underlying psychosomatic complaints about not being able to breathe or swallow and preoccupied with need for portable oxygen which has not changed at all    Perceptual Disturbances: None and does not appear responding to internal stimuli    Risk Potential: Tendency to not care for herself    Sensorium:  person, place, time/date, situation, day of week, month of year and time   Cognition:  grossly intact with no deficits in recent or remote memory or immediate recall   Consciousness:  alert and awake    Attention: Intact concentration and attention span   Intellect: Considered to be at least of average intelligence   Insight:  limited but improving   Judgment: improving      Motor Activity: no abnormal movements         Recent Labs:  Results Reviewed     None          I/O Past 24 hours:  No intake/output data recorded  No intake/output data recorded  Assessment / Plan:     [facility]ffective [facility]      Reason for continued inpatient care:  Because of underlying paranoia and inability to care for herself on her own and multiple somatic complaints  Acceptance by patient:  Accepting  Ala Human in recovery:  About living in same personal care home at the [place] once she feels better  Understanding of medications :  Has some understanding  Involved in reintegration process:   On off unit privileges now  Trusting in relatoinship with psychiatrist:  Trusting    Recommended Treatment:    Medication changes:  1) none today  Non-pharmacological treatments  1) continue with  individual therapy group therapy, milieu therapy and occupational therapy and milieu therapy involving multidisciplinary team approach with psychotherapist, case management, nursing, peer support services, art therapist etc using recovery principles and psycho-education about accepting illness and need for treatment   2) encourage to cooperate with behavior plan  3) encourage to attend to ADLs like taking showers and wearing clean clothes   4) Encourage to attend groups   5) see how she does on off unit privileges and encourage to go off unit with staff escort  and expectations discussed with her and she did verbalize an understanding, consult respiratory to see if she needs portable oxygen and their findings are still pending  Safety  1) Safety/communication plan established targeting dynamic risk factors above  Discharge Plan most likely back to the a:  Personal care boarding home with act services once her delusions are managed and mood becomes more stabilized and she attends to her ADLs she becomes more receptive to treatment compliance but she has not shown any improvement in the last 5 months since she has been on the unit and hence the referral needs to be initiated for the  [address] [place] [address] / Coordination of Care    Total floor / unit time spent today 15 minutes  Greater than 50% of total time was spent with the patient and / or family counseling and / or coordination of care  A description of the counseling / coordination of care  Patient's Rights, confidentiality and exceptions to confidentiality, use of automated medical record, [facility] staff access to medical record, and consent to treatment reviewed      Sandhya Bolaños MD

## 2020-01-02 NOTE — PLAN OF CARE
Problem: Anxiety  Goal: Anxiety is at manageable level  Description  Interventions:  - Assess and monitor patient's anxiety level  - Monitor for signs and symptoms of anxiety both physical and emotional (heart palpitations, chest pain, shortness of breath, headaches, nausea, feeling jumpy, restlessness, irritable, apprehensive)  - Collaborate with interdisciplinary team and initiate plan and interventions as ordered  - [place] patient to unit/surroundings  - Explain treatment plan  - Encourage participation in care  - Encourage verbalization of concerns/fears  - Identify coping mechanisms  - Assist in developing anxiety-reducing skills  - Administer/offer alternative therapies  - Limit or eliminate stimulants  Outcome: Progressing    Patient sought out therapist to take her for a walk off the unit  Patient indicated anxiety and agoraphobia, which was acknowledged by therapist, but not emphasized  Therapist oriented patient to where the outside hallway parallels the inside (unit) hallway, as a frame of reference for the distance she had traveled  Patient held therapist's arm, but was observed to be steady  Patient was able to navigate when not holding therapist's arm as well  Patient and therapist stopped in the [facility] [employer] which patient enjoyed  Then, we walked to the [employer] and patient was able to stand steady without support while looking at earrings for her sister  On the way back, patient asked to stop in therapist's office which we did and talked for a period of time  Patient volunteered narratives about her pregnancy and marriages  Patient's discussion about her second marriage included many paranoid thoughts, including being "set up" by a  and cameras in her home  It is unclear whether these stories are reality-based, or if they are delusions stemming from patient's condition  Patient spoke freely without any complaints regarding oxygen or pain    Paranoia only observed within the context of her narratives    Patient was off-the-unit with therapist for approximately one hour talking and/or walking without any indication of respiratory distress mentioned by patient or observed by therapist

## 2020-01-02 NOTE — PLAN OF CARE
Problem: Alteration in Thoughts and Perception  Goal: Attend and participate in unit activities, including therapeutic, recreational, and educational groups  Description  Interventions:  - Provide therapeutic and educational activities daily, encourage attendance and participation, and document same in the medical record     CERTIFIED PEER SPECIALIST INTERVENTIONS:    Complete peer assessment with patient to assess their needs and identify their goals to complete while in the recovery program as well as once discharged into the community  Patient will complete WRAP Plan, Crisis Plan and [employer]  Patient will attend 50% of groups offered on the unit  Patient will complete a goal card weekly  Outcome: Progressing  Patient attending groups and seems to spend less time in bed  Patient participates, engages and stays focused on topics/materials  Patient does not speak of her physical illnesses, as she used to in group settings and no longer needs to be redirected  Patient currently at 52% group attendance and has shown great improvement in the above areas

## 2020-01-02 NOTE — PROGRESS NOTES
Sleeping at this time, O2 on at 1/L via NC  No s/s of resp distress noted  Safe precautions in place and maintained    Monitoring continues

## 2020-01-02 NOTE — RESPIRATORY THERAPY NOTE
Pt Nithyamarielle doesn't qualify for O2 due to Pt not desating below 90% anytime during her walk study and resting

## 2020-01-02 NOTE — PROGRESS NOTES
Progress Note - Brandon Yang 1955, 60 y o  female MRN: [id number]    Unit/Bed#: [facility] 110-02 Encounter: [id number]    Primary Care Provider: Jordan Chavez PA-C   Date and time admitted to hospital: 7/23/2019  5:30 PM        * Schizoaffective disorder, bipolar type [facility])  Assessment & Plan  Psychiatry Progress Note  Patient is again upset and blaming the staff for not giving portable oxygen when the respiratory has told us that she does not need that  She verbalizes she has psychosomatic but continues to complain about not being able to breathe or able to swallow etc   She does take showers 1 to 2 times a week and was not granted a pass with her family yesterday because she did not venture out of the unit to the [place] with staff escort and today she states she will try to  However she has not voiced any concerns about her having cancer or dying from terrible illness  and still questions her medications sometimes but questions whether we are trying to kill her by not giving her the portable oxygen  She is wanting to go back to the [facility] than to the [facility] if she keeps up with her improvement  for a few more weeks in a row by continuing to attend groups more than 50% and attending to her ADLs like taking showers twice a week  She is still occasionally suspicious about certain staff tampering with her spirometer and oxygen concentrator off and on   She was again reminded  that she needs to show she can go off unit and off grounds first with staff before she can go off grounds with her sister and she verbalized understanding but is still insisting that she needs her portable oxygen but I reminded her that she needs to go off the unit 1st before we can consider that      Current medications:    Current Facility-Administered Medications:     acetaminophen (TYLENOL) tablet 650 mg, 650 mg, Oral, Q6H PRN, Prakash Brewster MD    albuterol (PROVENTIL HFA,VENTOLIN HFA) inhaler 2 puff, 2 puff, Inhalation, Q4H PRN, Shaggy Rangel MD, 2 puff at 10/11/19 0424    aluminum-magnesium hydroxide-simethicone (MYLANTA) 200-200-20 mg/5 mL oral suspension 15 mL, 15 mL, Oral, Q4H PRN, Shaggy Rangel MD    ammonium lactate (LAC-HYDRIN) 12 % lotion 1 application, 1 application, Topical, BID PRN, Shaggy Rangel MD    benzonatate (TESSALON PERLES) capsule 100 mg, 100 mg, Oral, TID PRN, Shaggy Rangel MD    benztropine (COGENTIN) injection 1 mg, 1 mg, Intramuscular, Q8H PRN, Shaggy Rangel MD    carbamide peroxide (DEBROX) 6 5 % otic solution 5 drop, 5 drop, Left Ear, BID, Rona Pitts MD, 5 drop at 01/01/20 1920    cloZAPine (CLOZARIL) tablet 25 mg, 25 mg, Oral, BID, Shaggy Rangel MD, 25 mg at 01/01/20 2050    cloZAPine (CLOZARIL) tablet 50 mg, 50 mg, Oral, BID, Shaggy Rangel MD, 50 mg at 01/01/20 2050    EPINEPHrine PF (ADRENALIN) 1 mg/mL injection 0 15 mg, 0 15 mg, Intramuscular, Once PRN, Shaggy Rangel MD    fluticasone-vilanterol (BREO ELLIPTA) 200-25 MCG/INH inhaler 1 puff, 1 puff, Inhalation, Daily, Shaggy Rangel MD, 1 puff at 01/02/20 0903    ketotifen (ZADITOR) 0 025 % ophthalmic solution 1 drop, 1 drop, Right Eye, BID PRN, Shaggy Rangel MD    levothyroxine tablet 125 mcg, 125 mcg, Oral, Early Morning, Shaggy Rangel MD, 125 mcg at 01/02/20 7390    magnesium hydroxide (MILK OF MAGNESIA) 400 mg/5 mL oral suspension 30 mL, 30 mL, Oral, Daily PRN, Shaggy Rnagel MD    montelukast (SINGULAIR) tablet 10 mg, 10 mg, Oral, HS, Shaggy Rangel MD, 10 mg at 12/24/19 2109    OLANZapine (ZyPREXA) IM injection 5 mg, 5 mg, Intramuscular, Q8H PRN, Shaggy aRngel MD    OLANZapine (ZyPREXA) tablet 5 mg, 5 mg, Oral, Q8H PRN, Shaggy Rangel MD    ondansetron (ZOFRAN-ODT) dispersible tablet 4 mg, 4 mg, Oral, Q6H PRN, Shagyg Rangel MD, 4 mg at 12/09/19 1757    pantoprazole (PROTONIX) EC tablet 40 mg, 40 mg, Oral, Early Morning, Shaggy Rangel MD, 40 mg at 01/02/20 0057    polyethylene glycol (MIRALAX) packet 17 g, 17 g, Oral, Daily PRN, Dalton aGrner MD    polyvinyl alcohol (LIQUIFILM TEARS) 1 4 % ophthalmic solution 1 drop, 1 drop, Both Eyes, Q3H PRN, Dalton Garner MD    sertraline (ZOLOFT) tablet 50 mg, 50 mg, Oral, BID, Dalton Garner MD, 50 mg at 01/02/20 0299    sucralfate (CARAFATE) oral suspension 1,000 mg, 1,000 mg, Oral, BID, Jd Koehler MD, 1,000 mg at 01/02/20 9381    theophylline (JEF-24) 24 hr capsule 200 mg, 200 mg, Oral, Daily, Dalton Garner MD, 200 mg at 01/02/20 4025    tiotropium [facility]) capsule for inhaler 18 mcg, 18 mcg, Inhalation, Daily, Dalton Garner MD, 18 mcg at 01/02/20 6595    traZODone (DESYREL) tablet 25 mg, 25 mg, Oral, HS PRN, Dalton Garner MD  Justification if on more than two antipsychotics:  Only on his clozapine  Side effects if any:  None    Risks , benefits, side effects and precautions of medications discussed with patient and reminded patient to let us know any problems with medications     Objective:     Vital Signs:  Vitals:    01/01/20 2015 01/01/20 2200 01/02/20 0730 01/02/20 0732   BP: 98/70  111/72 156/60   BP Location: Right arm  Left arm Left arm   Pulse: 86 79 83 85   Resp: 18  16    Temp: (!) 97 °F (36 1 °C)  97 7 °F (36 5 °C)    TempSrc: Temporal  Temporal    SpO2: 94% 92%  92%   Weight:       Height:         Appearance:  age appropriate, casually dressed and overweight older than stated age, appears better groomed and combed hair   Behavior:  evasive and guarded with no psychosomatic complaints, friendly but argumentative about need for portable oxygen   Speech:  normal pitch and normal volume but circumstantial and tangential with stilted speech    Mood:  anxious and dysthymic   Affect:  mood-congruent, elated and entitled anxious and irritated and sad at times but pleasant today    Thought Process:  goal directed and illogical slightly pressured and tangential and talks as if in a court of law   Thought Content:  No current suicidal homicidal thoughts intent or plans reported  No phobias obsessions or compulsions reported  Still accusatory  to certain staff off and on about time bring with her spirometer or her inhaler r  No overt delusions elicited today but still with some underlying psychosomatic complaints about not being able to breathe or swallow and preoccupied with need for portable oxygen and afraid that she is going to die without portable oxygen if she goes off the unit   Perceptual Disturbances: None and does not appear responding to internal stimuli    Risk Potential: Tendency to not care for herself    Sensorium:  person, place, time/date, situation, day of week, month of year and time   Cognition:  grossly intact with no deficits in recent or remote memory or immediate recall   Consciousness:  alert and awake    Attention: Intact concentration and attention span   Intellect: Considered to be at least of average intelligence   Insight:  limited but improving   Judgment: improving      Motor Activity: no abnormal movements         Recent Labs:  Results Reviewed     None          I/O Past 24 hours:  No intake/output data recorded  No intake/output data recorded  Assessment / Plan:     [facility]ffect[facility]      Reason for continued inpatient care:  Because of underlying paranoia and inability to care for herself on her own and multiple somatic complaints  Acceptance by patient:  Accepting  Willai File in recovery:  About living in same personal care home at the [place] once she feels better  Understanding of medications :  Has some understanding  Involved in reintegration process:   On off unit privileges now  Trusting in relatoinship with psychiatrist:  Trusting    Recommended Treatment:    Medication changes:  1) none today  Non-pharmacological treatments  1) continue with  individual therapy group therapy, milieu therapy and occupational therapy and milieu therapy involving multidisciplinary team approach with psychotherapist, case management, nursing, peer support services, art therapist etc using recovery principles and psycho-education about accepting illness and need for treatment   2) encourage to cooperate with behavior plan  3) encourage to attend to ADLs like taking showers and wearing clean clothes   4) Encourage to attend groups   5) see how she does on off unit privileges and encourage to go off unit with staff escort  and expectations discussed with her and she did verbalize an understanding, consult respiratory to see if she needs portable oxygen and their findings are still pending  Safety  1) Safety/communication plan established targeting dynamic risk factors above  Discharge Plan most likely back to the a:  Personal care boarding home with act services once her delusions are managed and mood becomes more stabilized and she attends to her ADLs she becomes more receptive to treatment compliance but she has not shown any improvement in the last 5 months since she has been on the unit and hence the referral needs to be initiated for the  [address] [place] [address] / Coordination of Care    Total floor / unit time spent today 15 minutes  Greater than 50% of total time was spent with the patient and / or family counseling and / or coordination of care  A description of the counseling / coordination of care  Patient's Rights, confidentiality and exceptions to confidentiality, use of automated medical record, [facility] staff access to medical record, and consent to treatment reviewed      Felisa Florence MD

## 2020-01-02 NOTE — PROGRESS NOTES
Not scheduled to attend      12/31/19 1430   Activity/Group Checklist   Group   (Community Programming Wrap Up  )   Attendance Did not attend   Attendance Duration (min) 16-30   Affect/Mood IRMA

## 2020-01-02 NOTE — PROGRESS NOTES
01/02/20 1100   Activity/Group Checklist   Group   (IMR/Relapse,Recovery and Resiliency )   Attendance Attended   Attendance Duration (min) 46-60   Interactions Interacted appropriately   Affect/Mood Appropriate;Normal range   Goals Achieved Identified feelings; Able to listen to others; Able to engage in interactions; Able to reflect/comment on own behavior;Able to self-disclose

## 2020-01-02 NOTE — ASSESSMENT & PLAN NOTE
Psychiatry Progress Note  Patient is again upset and blaming the staff for not giving portable oxygen when the respiratory has told us that she does not need that  She verbalizes she has psychosomatic but continues to complain about not being able to breathe or able to swallow etc   She does take showers 1 to 2 times a week and was not granted a pass with her family yesterday because she did not venture out of the unit to the [place] with staff escort and today she states she will try to  However she has not voiced any concerns about her having cancer or dying from terrible illness  and still questions her medications sometimes but questions whether we are trying to kill her by not giving her the portable oxygen  She is wanting to go back to the [facility] rather than to the [facility] if she keeps up with her improvement  for a few more weeks in a row by continuing to attend groups more than 50% and attending to her ADLs like taking showers twice a week  She is still occasionally suspicious about certain staff tampering with her spirometer and oxygen concentrator off and on   She was again reminded  that she needs to show she can go off unit and off grounds first with staff before she can go off grounds with her sister and she verbalized understanding but is still insisting that she needs her portable oxygen but I reminded her that she needs to go off the unit 1st before we can consider that      Current medications:    Current Facility-Administered Medications:     acetaminophen (TYLENOL) tablet 650 mg, 650 mg, Oral, Q6H PRN, Meredith Wesley MD    albuterol (PROVENTIL HFA,VENTOLIN HFA) inhaler 2 puff, 2 puff, Inhalation, Q4H PRN, Meredith Wesley MD, 2 puff at 10/11/19 0424    aluminum-magnesium hydroxide-simethicone (MYLANTA) 200-200-20 mg/5 mL oral suspension 15 mL, 15 mL, Oral, Q4H PRN, Meredith Wesley MD    ammonium lactate (LAC-HYDRIN) 12 % lotion 1 application, 1 application, Topical, BID PRN, Chichi [facility] MD    benzonatate (TESSALON PERLES) capsule 100 mg, 100 mg, Oral, TID PRN, Chichi Lockett MD    benztropine (COGENTIN) injection 1 mg, 1 mg, Intramuscular, Q8H PRN, Chichi Lockett MD    carbamide peroxide (DEBROX) 6 5 % otic solution 5 drop, 5 drop, Left Ear, BID, Rona Santamaria MD, 5 drop at 01/01/20 1920    cloZAPine (CLOZARIL) tablet 25 mg, 25 mg, Oral, BID, Chichi Lockett MD, 25 mg at 01/01/20 2050    cloZAPine (CLOZARIL) tablet 50 mg, 50 mg, Oral, BID, Chichi Lockett MD, 50 mg at 01/01/20 2050    EPINEPHrine PF (ADRENALIN) 1 mg/mL injection 0 15 mg, 0 15 mg, Intramuscular, Once PRN, Chichi Lockett MD    fluticasone-vilanterol (BREO ELLIPTA) 200-25 MCG/INH inhaler 1 puff, 1 puff, Inhalation, Daily, Chichi Lockett MD, 1 puff at 01/02/20 0903    ketotifen (ZADITOR) 0 025 % ophthalmic solution 1 drop, 1 drop, Right Eye, BID PRN, Chichi Lockett MD    levothyroxine tablet 125 mcg, 125 mcg, Oral, Early Morning, Chichi Lockett MD, 125 mcg at 01/02/20 2522    magnesium hydroxide (MILK OF MAGNESIA) 400 mg/5 mL oral suspension 30 mL, 30 mL, Oral, Daily PRN, Chichi Lockett MD    montelukast (SINGULAIR) tablet 10 mg, 10 mg, Oral, HS, Chichi Lockett MD, 10 mg at 12/24/19 2109    OLANZapine (ZyPREXA) IM injection 5 mg, 5 mg, Intramuscular, Q8H PRN, Chichi Lockett MD    OLANZapine (ZyPREXA) tablet 5 mg, 5 mg, Oral, Q8H PRN, Chichi Lockett MD    ondansetron (ZOFRAN-ODT) dispersible tablet 4 mg, 4 mg, Oral, Q6H PRN, Chichi Lockett MD, 4 mg at 12/09/19 1757    pantoprazole (PROTONIX) EC tablet 40 mg, 40 mg, Oral, Early Morning, Chichi Lockett MD, 40 mg at 01/02/20 7148    polyethylene glycol (MIRALAX) packet 17 g, 17 g, Oral, Daily PRN, Chichi Lockett MD    polyvinyl alcohol (LIQUIFILM TEARS) 1 4 % ophthalmic solution 1 drop, 1 drop, Both Eyes, Q3H PRN, Chichi Lockett MD    sertraline (ZOLOFT) tablet 50 mg, 50 mg, Oral, BID, Chichi Lockett MD, 50 mg at 01/02/20 0903    sucralfate (CARAFATE) oral suspension 1,000 mg, 1,000 mg, Oral, BID, Carola Fournier MD, 1,000 mg at 01/02/20 6742    theophylline (JEF-24) 24 hr capsule 200 mg, 200 mg, Oral, Daily, Jeet Levine MD, 200 mg at 01/02/20 0980    tiotropium [facility]) capsule for inhaler 18 mcg, 18 mcg, Inhalation, Daily, Jeet Levine MD, 18 mcg at 01/02/20 6361    traZODone (DESYREL) tablet 25 mg, 25 mg, Oral, HS PRN, Jeet Levine MD  Justification if on more than two antipsychotics:  Only on his clozapine  Side effects if any:  None    Risks , benefits, side effects and precautions of medications discussed with patient and reminded patient to let us know any problems with medications     Objective:     Vital Signs:  Vitals:    01/01/20 2015 01/01/20 2200 01/02/20 0730 01/02/20 0732   BP: 98/70  111/72 156/60   BP Location: Right arm  Left arm Left arm   Pulse: 86 79 83 85   Resp: 18  16    Temp: (!) 97 °F (36 1 °C)  97 7 °F (36 5 °C)    TempSrc: Temporal  Temporal    SpO2: 94% 92%  92%   Weight:       Height:         Appearance:  age appropriate, casually dressed and overweight older than stated age, appears better groomed and combed hair   Behavior:  evasive and guarded with no psychosomatic complaints, friendly but argumentative about need for portable oxygen   Speech:  normal pitch and normal volume but circumstantial and tangential with stilted speech    Mood:  anxious and dysthymic   Affect:  mood-congruent, elated and entitled anxious and irritated and sad at times but pleasant today    Thought Process:  goal directed and illogical slightly pressured and tangential and talks as if in a court of law   Thought Content:  No current suicidal homicidal thoughts intent or plans reported  No phobias obsessions or compulsions reported  Still accusatory  to certain staff off and on about time bring with her spirometer or her inhaler r    No overt delusions elicited today but still with some underlying psychosomatic complaints about not being able to breathe or swallow and preoccupied with need for portable oxygen and afraid that she is going to die without portable oxygen if she goes off the unit   Perceptual Disturbances: None and does not appear responding to internal stimuli    Risk Potential: Tendency to not care for herself    Sensorium:  person, place, time/date, situation, day of week, month of year and time   Cognition:  grossly intact with no deficits in recent or remote memory or immediate recall   Consciousness:  alert and awake    Attention: Intact concentration and attention span   Intellect: Considered to be at least of average intelligence   Insight:  limited but improving   Judgment: improving      Motor Activity: no abnormal movements         Recent Labs:  Results Reviewed     None          I/O Past 24 hours:  No intake/output data recorded  No intake/output data recorded  Assessment / Plan:     [facility]oaffective [facility]      Reason for continued inpatient care:  Because of underlying paranoia and inability to care for herself on her own and multiple somatic complaints  Acceptance by patient:  Accepting  Mateusz Mcghee in recovery:  About living in same personal care home at the [place] once she feels better  Understanding of medications :  Has some understanding  Involved in reintegration process: On off unit privileges now  Trusting in relatoinship with psychiatrist:  Trusting    Recommended Treatment:    Medication changes:  1) none today  Non-pharmacological treatments  1) continue with  individual therapy group therapy, milieu therapy and occupational therapy and milieu therapy involving multidisciplinary team approach with psychotherapist, case management, nursing, peer support services, art therapist etc using recovery principles and psycho-education about accepting illness and need for treatment     2) encourage to cooperate with behavior plan  3) encourage to attend to ADLs like taking showers and wearing clean clothes   4) Encourage to attend groups   5) see how she does on off unit privileges and encourage to go off unit with staff escort  and expectations discussed with her and she did verbalize an understanding, consult respiratory to see if she needs portable oxygen and their findings are still pending  Safety  1) Safety/communication plan established targeting dynamic risk factors above  Discharge Plan most likely back to the a:  Personal care boarding home with act services once her delusions are managed and mood becomes more stabilized and she attends to her ADLs she becomes more receptive to treatment compliance but she has not shown any improvement in the last 5 months since she has been on the unit and hence the referral needs to be initiated for the  [address] [place] [address] / Coordination of Care    Total floor / unit time spent today 15 minutes  Greater than 50% of total time was spent with the patient and / or family counseling and / or coordination of care  A description of the counseling / coordination of care  Patient's Rights, confidentiality and exceptions to confidentiality, use of automated medical record, [facility] staff access to medical record, and consent to treatment reviewed      Ivonne Nevarez MD

## 2020-01-03 NOTE — PROGRESS NOTES
01/03/20 0800   Team Meeting   Meeting Type Daily Rounds   Team Members Present   Team Members Present Physician;Nurse;; Other (Discipline and Name)   Physician Team Member Dr Joni Tavarez Team Member Soledad Rayo RN   Care Management Team Member Brenda Donis   Other (Discipline and Name) Janee Yanes LCSW     Patient was able to go off unit with therapist yesterday for an hour  No behavioral issues  Slept

## 2020-01-03 NOTE — PLAN OF CARE
Problem: Alteration in Thoughts and Perception  Goal: Treatment Goal: Gain control of psychotic behaviors/thinking, reduce/eliminate presenting symptoms and demonstrate improved reality functioning upon discharge  Outcome: Progressing  Goal: Verbalize thoughts and feelings  Description  Interventions:  - Promote a nonjudgmental and trusting relationship with the patient through active listening and therapeutic communication  - Assess patient's level of functioning, behavior and potential for risk  - Engage patient in 1 on 1 interactions for a minimum of 15 minutes each session  - Encourage patient to express fears, feelings, frustrations, and discuss symptoms    - [place] patient to reality, help patient recognize reality-based thinking   - Administer medications as ordered and assess for potential side effects  - Provide the patient education related to the signs and symptoms of the illness and desired effects of prescribed medications  Outcome: Progressing  Goal: Agree to be compliant with medication regime, as prescribed and report medication side effects  Description  Interventions:  - Offer appropriate PRN medication and supervise ingestion; conduct aims, as needed   Outcome: Progressing  Goal: Attend and participate in unit activities, including therapeutic, recreational, and educational groups  Description  Interventions:  - Provide therapeutic and educational activities daily, encourage attendance and participation, and document same in the medical record     CERTIFIED PEER SPECIALIST INTERVENTIONS:    Complete peer assessment with patient to assess their needs and identify their goals to complete while in the recovery program as well as once discharged into the community  Patient will complete WRAP Plan, Crisis Plan and [facility] [employer] Dom[employer]  Patient will attend 50% of groups offered on the unit  Patient will complete a goal card weekly      Outcome: Progressing     Problem: Ineffective Coping  Goal: Participates in unit activities  Description  Interventions:  - Provide therapeutic environment   - Provide required programming   - Redirect inappropriate behaviors   Outcome: Progressing  Goal: Patient/Family verbalizes awareness of resources  Outcome: Progressing  Goal: Understands least restrictive measures  Description  Interventions:  - Utilize least restrictive behavior  Outcome: Progressing     Problem: Risk for Self Injury/Neglect  Goal: Treatment Goal: Remain safe during length of stay, learn and adopt new coping skills, and be free of self-injurious ideation, impulses and acts at the time of discharge  Outcome: Progressing  Goal: Verbalize thoughts and feelings  Description  Interventions:  - Assess and re-assess patient's lethality and potential for self-injury  - Engage patient in 1:1 interactions, daily, for a minimum of 15 minutes  - Encourage patient to express feelings, fears, frustrations, hopes  - Establish rapport/trust with patient   Outcome: Progressing  Goal: Refrain from harming self  Description  Interventions:  - Monitor patient closely, per order  - Develop a trusting relationship  - Supervise medication ingestion, monitor effects and side effects   Outcome: Progressing  Goal: Attend and participate in unit activities, including therapeutic, recreational, and educational groups  Description  Interventions:  - Provide therapeutic and educational activities daily, encourage attendance and participation, and document same in the medical record  - Obtain collateral information, encourage visitation and family involvement in care   Outcome: Progressing     Problem: Depression  Goal: Treatment Goal: Demonstrate behavioral control of depressive symptoms, verbalize feelings of improved mood/affect, and adopt new coping skills prior to discharge  Outcome: Progressing  Goal: Verbalize thoughts and feelings  Description  Interventions:  - Assess and re-assess patient's level of risk   - Engage patient in 1:1 interactions, daily, for a minimum of 15 minutes   - Encourage patient to express feelings, fears, frustrations, hopes   Outcome: Progressing  Goal: Refrain from isolation  Description  Interventions:  - Develop a trusting relationship   - Encourage socialization   Outcome: Progressing     Problem: Anxiety  Goal: Anxiety is at manageable level  Description  Interventions:  - Assess and monitor patient's anxiety level  - Monitor for signs and symptoms of anxiety both physical and emotional (heart palpitations, chest pain, shortness of breath, headaches, nausea, feeling jumpy, restlessness, irritable, apprehensive)  - Collaborate with interdisciplinary team and initiate plan and interventions as ordered    - [place] patient to unit/surroundings  - Explain treatment plan  - Encourage participation in care  - Encourage verbalization of concerns/fears  - Identify coping mechanisms  - Assist in developing anxiety-reducing skills  - Administer/offer alternative therapies  - Limit or eliminate stimulants  Outcome: Progressing     Problem: Alteration in Orientation  Goal: Interact with staff daily  Description  Interventions:  - Assess and re-assess patient's level of orientation  - Engage patient in 1 on 1 interactions, daily, for a minimum of 15 minutes   - Establish rapport/trust with patient   Outcome: Progressing     Problem: PAIN - ADULT  Goal: Verbalizes/displays adequate comfort level or baseline comfort level  Description  Interventions:  - Encourage patient to monitor pain and request assistance  - Assess pain using appropriate pain scale  - Administer analgesics based on type and severity of pain and evaluate response  - Implement non-pharmacological measures as appropriate and evaluate response  - Consider cultural and social influences on pain and pain management  - Notify physician/advanced practitioner if interventions unsuccessful or patient reports new pain  Outcome: Progressing     Problem: SAFETY ADULT  Goal: Patient will remain free of falls  Description  INTERVENTIONS:  - Assess patient frequently for physical needs  -  Identify cognitive and physical deficits and behaviors that affect risk of falls  -  [place] fall precautions as indicated by assessment   - Educate patient/family on patient safety including physical limitations  - Instruct patient to call for assistance with activity based on assessment  - Modify environment to reduce risk of injury  - Consider OT/PT consult to assist with strengthening/mobility  Outcome: Progressing     Problem: Nutrition/Hydration-ADULT  Goal: Nutrient/Hydration intake appropriate for improving, restoring or maintaining nutritional needs  Description  Monitor and assess patient's nutrition/hydration status for malnutrition  Collaborate with interdisciplinary team and initiate plan and interventions as ordered  Monitor patient's weight and dietary intake as ordered or per policy  Utilize nutrition screening tool and intervene as necessary  Determine patient's food preferences and provide high-protein, high-caloric foods as appropriate  INTERVENTIONS:  - Monitor oral intake, urinary output, labs, and treatment plans  - Assess nutrition and hydration status and recommend course of action  - Evaluate amount of meals eaten  - Assist patient with eating if necessary   - Allow adequate time for meals  - Recommend/ encourage appropriate diets, oral nutritional supplements, and vitamin/mineral supplements  - Order, calculate, and assess calorie counts as needed  - Recommend, monitor, and adjust tube feedings and TPN/PPN based on assessed needs  - Assess need for intravenous fluids  - Provide specific nutrition/hydration education as appropriate  - Include patient/family/caregiver in decisions related to nutrition  Outcome: Progressing   Visible intermittently, pleasant and social with others while in the milieu, attended IMR group   No somatic complaints voiced this shift  Med and meal compliant  Appetite appears to be improving as she ate 75% of breakfast and 50% of lunch and has been less selective with the consistency / texture of what she will eat  No issues noted  Continue to monitor

## 2020-01-03 NOTE — PROGRESS NOTES
More visible this evening  Went off unit with psychotherapist and attended mass  Both reported that their time off unit went well  Patient experienced no difficulties breathing and in fact was reportedly able to consistently talk while ambulating without any difficulties  Med compliant this evening  Ate 25% of her food and drank her ensure  No other behaviors or issues noted  Continue to monitor

## 2020-01-03 NOTE — ASSESSMENT & PLAN NOTE
Psychiatry Progress Note  Patient was finally able to go off the unit with the her therapist for almost an hour 1st to the [place] and then to the [employer] shop with no breathing difficulties reported at all  The respiratory as written and not finally stating that she does not need portable oxygen as her resting as well as exercise pulse ox was over 90%  However she is still fixated on needing portable oxygen later  She was told that she will be now taken off the waiting list for [facility] because of remarkable improvement and she is happy about that  She is no longer voicing any thoughts about her having cancer or dying from terrible illnesses  and still questions her medications sometimes  She is wanting to go back to the [facility]  and I did remind her that involves keeping up with her hygiene like taking shower twice a week and attending 50% of groups and she has agreed to comply  She is still occasionally suspicious about certain staff tampering with her spirometer and oxygen concentrator off and on       Current medications:    Current Facility-Administered Medications:     acetaminophen (TYLENOL) tablet 650 mg, 650 mg, Oral, Q6H PRN, Meredith Wesley MD    albuterol (PROVENTIL HFA,VENTOLIN HFA) inhaler 2 puff, 2 puff, Inhalation, Q4H PRN, Meredith Wesley MD, 2 puff at 10/11/19 0424    aluminum-magnesium hydroxide-simethicone (MYLANTA) 200-200-20 mg/5 mL oral suspension 15 mL, 15 mL, Oral, Q4H PRN, Meredith Wesley MD    ammonium lactate (LAC-HYDRIN) 12 % lotion 1 application, 1 application, Topical, BID PRN, Meredith Wesley MD    benzonatate (TESSALON PERLES) capsule 100 mg, 100 mg, Oral, TID PRN, Meredith Wesley MD    benztropine (COGENTIN) injection 1 mg, 1 mg, Intramuscular, Q8H PRN, Meredith Wesley MD    carbamide peroxide [facility]) 6 5 % otic solution 5 drop, 5 drop, Left Ear, BID, Rona Conte MD, 5 drop at 01/03/20 0806    cloZAPine (CLOZARIL) tablet 25 mg, 25 mg, Oral, BID, Meredith Wesley MD, 25 mg at 01/02/20 2039    cloZAPine (CLOZARIL) tablet 50 mg, 50 mg, Oral, BID, Greer Beltran MD, 50 mg at 01/02/20 2040    EPINEPHrine PF (ADRENALIN) 1 mg/mL injection 0 15 mg, 0 15 mg, Intramuscular, Once PRN, Greer Beltran MD    fluticasone-vilanterol (BREO ELLIPTA) 200-25 MCG/INH inhaler 1 puff, 1 puff, Inhalation, Daily, Greer Beltran MD, 1 puff at 01/03/20 0844    ketotifen (ZADITOR) 0 025 % ophthalmic solution 1 drop, 1 drop, Right Eye, BID PRN, Greer Beltran MD    levothyroxine tablet 125 mcg, 125 mcg, Oral, Early Morning, Greer Beltran MD, 125 mcg at 01/03/20 0606    magnesium hydroxide (MILK OF MAGNESIA) 400 mg/5 mL oral suspension 30 mL, 30 mL, Oral, Daily PRN, Greer Beltran MD    montelukast (SINGULAIR) tablet 10 mg, 10 mg, Oral, HS, Greer Beltran MD, 10 mg at 12/24/19 2109    OLANZapine (ZyPREXA) IM injection 5 mg, 5 mg, Intramuscular, Q8H PRN, Greer Beltran MD    OLANZapine (ZyPREXA) tablet 5 mg, 5 mg, Oral, Q8H PRN, Greer Beltran MD    ondansetron (ZOFRAN-ODT) dispersible tablet 4 mg, 4 mg, Oral, Q6H PRN, Greer Beltran MD, 4 mg at 12/09/19 1757    pantoprazole (PROTONIX) EC tablet 40 mg, 40 mg, Oral, Early Morning, Greer Beltran MD, 40 mg at 01/03/20 0606    polyethylene glycol (MIRALAX) packet 17 g, 17 g, Oral, Daily PRN, Greer Beltran MD    polyvinyl alcohol (LIQUIFILM TEARS) 1 4 % ophthalmic solution 1 drop, 1 drop, Both Eyes, Q3H PRN, Greer Beltran MD    sertraline (ZOLOFT) tablet 50 mg, 50 mg, Oral, BID, Greer Beltran MD, 50 mg at 01/03/20 0847    sucralfate (CARAFATE) oral suspension 1,000 mg, 1,000 mg, Oral, BID, Cat Torres MD, 1,000 mg at 01/03/20 0606    theophylline (JEF-24) 24 hr capsule 200 mg, 200 mg, Oral, Daily, Greer Beltran MD, 200 mg at 01/03/20 0847    tiotropium (SPIRIVA) capsule for inhaler 18 mcg, 18 mcg, Inhalation, Daily, Greer Beltran MD, 18 mcg at 01/03/20 0845    traZODone (DESYREL) tablet 25 mg, 25 mg, Oral, HS PRN, Greer Beltran MD  Justification if on more than two antipsychotics:  Only on his clozapine  Side effects if any:  None    Risks , benefits, side effects and precautions of medications discussed with patient and reminded patient to let us know any problems with medications     Objective:     Vital Signs:  Vitals:    01/02/20 1500 01/02/20 2000 01/03/20 0500 01/03/20 0801   BP: 112/68 96/68  121/72   BP Location: Left arm Left arm  Left arm   Pulse: 78 98  89   Resp: 16 15  18   Temp: 97 6 °F (36 4 °C) (!) 97 2 °F (36 2 °C)  (!) 97 1 °F (36 2 °C)   TempSrc: Temporal Temporal     SpO2: (!) 89% 91% 95%    Weight:       Height:         Appearance:  age appropriate, casually dressed and overweight older than stated age, appears better groomed and combed hair and pleasant   Behavior:  evasive and guarded with no psychosomatic complaints, friendly but argumentative at times   Speech:  normal pitch and normal volume but circumstantial and tangential with stilted speech    Mood:  anxious and dysthymic   Affect:  mood-congruent, elated and entitled anxious and irritated and sad at times but pleasant today    Thought Process:  goal directed and illogical slightly pressured and tangential and talks as if in a court of law   Thought Content:  No current suicidal homicidal thoughts intent or plans reported  No phobias obsessions or compulsions reported  Still expresses some paranoia about certain staff tampering with her inhaler    No overt delusions elicited today but continues to have occasional psychosomatic symptoms like having breathing difficulty and swallowing difficulty   Perceptual Disturbances: None and does not appear responding to internal stimuli    Risk Potential: Tendency to not care for herself    Sensorium:  person, place, time/date, situation, day of week, month of year and time   Cognition:  grossly intact with no deficits in recent or remote memory or immediate recall   Consciousness:  alert and awake    Attention: Intact concentration and attention span Intellect: Considered to be at least of average intelligence   Insight:  limited but improving   Judgment: improving      Motor Activity: no abnormal movements         Recent Labs:  Results Reviewed     None          I/O Past 24 hours:  No intake/output data recorded  No intake/output data recorded  Assessment / Plan:     Schizoaffective disorder, bipolar type ([facility] [address] )      Reason for continued inpatient care:  Because of underlying paranoia and inability to care for herself on her own and multiple somatic complaints  Acceptance by patient:  Accepting  Arian Fitzpatrick in recovery:  About living in same personal care home at the [place] once she feels better  Understanding of medications :  Has some understanding  Involved in reintegration process: On off unit privileges now  Trusting in relatoinship with psychiatrist:  Trusting    Recommended Treatment:    Medication changes:  1) none today  Non-pharmacological treatments  1) continue with  individual therapy group therapy, milieu therapy and occupational therapy and milieu therapy involving multidisciplinary team approach with psychotherapist, case management, nursing, peer support services, art therapist etc using recovery principles and psycho-education about accepting illness and need for treatment   2) encourage to cooperate with behavior plan  3) encourage to attend to ADLs like taking showers and wearing clean clothes   4) Encourage to attend groups   5) see how she does on off unit privileges and encourage to go off unit with staff escort  and expectations discussed with her and she did verbalize an understanding, consult respiratory to see if she needs portable oxygen and their findings are still pending  Safety  1) Safety/communication plan established targeting dynamic risk factors above    Discharge Plan back to the [facility] with act services and withdraw the [facility] referral due to improvement made so far  Counseling / Coordination of Care    Total floor / unit time spent today 15 minutes  Greater than 50% of total time was spent with the patient and / or family counseling and / or coordination of care  A description of the counseling / coordination of care  Patient's Rights, confidentiality and exceptions to confidentiality, use of automated medical record, Jeb Patrick staff access to medical record, and consent to treatment reviewed      Christian Bennett MD

## 2020-01-03 NOTE — PROGRESS NOTES
Progress Note - Taylor Sena 1955, 60 y o  female MRN: [id number]    Unit/Bed#: [facility] 110-02 Encounter: [id number]    Primary Care Provider: Marsha Wei PA-C   Date and time admitted to hospital: 7/23/2019  5:30 PM        * Schizoaffective disorder, bipolar type [facility])  Assessment & Plan  Psychiatry Progress Note  Patient was finally able to go off the unit with the her therapist for almost an hour 1st to the [address] [employer] and then to the [employer] with no breathing difficulties reported at all  The respiratory as written and not finally stating that she does not need portable oxygen as her resting as well as exercise pulse ox was over 90%  However she is still fixated on needing portable oxygen later  She was told that she will be now taken off the waiting list for [facility] because of remarkable improvement and she is happy about that  She is no longer voicing any thoughts about her having cancer or dying from terrible illnesses  and still questions her medications sometimes  She is wanting to go back to the [facility] personal care home  and I did remind her that involves keeping up with her hygiene like taking shower twice a week and attending 50% of groups and she has agreed to comply  She is still occasionally suspicious about certain staff tampering with her spirometer and oxygen concentrator off and on       Current medications:    Current Facility-Administered Medications:     acetaminophen (TYLENOL) tablet 650 mg, 650 mg, Oral, Q6H PRN, Shaggy Rangel MD    albuterol (PROVENTIL HFA,VENTOLIN HFA) inhaler 2 puff, 2 puff, Inhalation, Q4H PRN, Shaggy Rangel MD, 2 puff at 10/11/19 0424    aluminum-magnesium hydroxide-simethicone (MYLANTA) 200-200-20 mg/5 mL oral suspension 15 mL, 15 mL, Oral, Q4H PRN, Shaggy Rangel MD    ammonium lactate (LAC-HYDRIN) 12 % lotion 1 application, 1 application, Topical, BID PRN, Shaggy Rangel MD    benzonatate (TESSALON PERLES) capsule 100 mg, 100 mg, Oral, TID PRN, Mynor Terry MD    benztropine (COGENTIN) injection 1 mg, 1 mg, Intramuscular, Q8H PRN, Mynor Terry MD    carbamide peroxide (DEBROX) 6 5 % otic solution 5 drop, 5 drop, Left Ear, BID, Mame Noble Aschoff, MD, 5 drop at 01/03/20 7934    cloZAPine (CLOZARIL) tablet 25 mg, 25 mg, Oral, BID, Mynor Terry MD, 25 mg at 01/02/20 2039    cloZAPine (CLOZARIL) tablet 50 mg, 50 mg, Oral, BID, Mynor Terry MD, 50 mg at 01/02/20 2040    EPINEPHrine PF (ADRENALIN) 1 mg/mL injection 0 15 mg, 0 15 mg, Intramuscular, Once PRN, Mynor Terry MD    fluticasone-vilanterol (BREO ELLIPTA) 200-25 MCG/INH inhaler 1 puff, 1 puff, Inhalation, Daily, Mynor Terry MD, 1 puff at 01/03/20 0844    ketotifen (ZADITOR) 0 025 % ophthalmic solution 1 drop, 1 drop, Right Eye, BID PRN, Mynor Terry MD    levothyroxine tablet 125 mcg, 125 mcg, Oral, Early Morning, Mynor Terry MD, 125 mcg at 01/03/20 0606    magnesium hydroxide (MILK OF MAGNESIA) 400 mg/5 mL oral suspension 30 mL, 30 mL, Oral, Daily PRN, Mynor Terry MD    montelukast (SINGULAIR) tablet 10 mg, 10 mg, Oral, HS, Mynor Terry MD, 10 mg at 12/24/19 2109    OLANZapine (ZyPREXA) IM injection 5 mg, 5 mg, Intramuscular, Q8H PRN, Mynor Terry MD    OLANZapine (ZyPREXA) tablet 5 mg, 5 mg, Oral, Q8H PRN, Mynor Terry MD    ondansetron (ZOFRAN-ODT) dispersible tablet 4 mg, 4 mg, Oral, Q6H PRN, Mynor Terry MD, 4 mg at 12/09/19 1757    pantoprazole (PROTONIX) EC tablet 40 mg, 40 mg, Oral, Early Morning, Mynor Terry MD, 40 mg at 01/03/20 0606    polyethylene glycol (MIRALAX) packet 17 g, 17 g, Oral, Daily PRN, Mynor Terry MD    polyvinyl alcohol (LIQUIFILM TEARS) 1 4 % ophthalmic solution 1 drop, 1 drop, Both Eyes, Q3H PRN, Mynor Terry MD    sertraline (ZOLOFT) tablet 50 mg, 50 mg, Oral, BID, Mynor Terry MD, 50 mg at 01/03/20 0847    sucralfate (CARAFATE) oral suspension 1,000 mg, 1,000 mg, Oral, BID, Cat Torres MD, 1,000 mg at 01/03/20 0606    theophylline (JEF-24) 24 hr capsule 200 mg, 200 mg, Oral, Daily, Ivonne Nevarez MD, 200 mg at 01/03/20 0847    [facility]) capsule for inhaler 18 mcg, 18 mcg, Inhalation, Daily, Ivonne Nevarez MD, 18 mcg at 01/03/20 0845    traZODone (DESYREL) tablet 25 mg, 25 mg, Oral, HS PRN, Ivonne Nevarez MD  Justification if on more than two antipsychotics:  Only on his clozapine  Side effects if any:  None    Risks , benefits, side effects and precautions of medications discussed with patient and reminded patient to let us know any problems with medications     Objective:     Vital Signs:  Vitals:    01/02/20 1500 01/02/20 2000 01/03/20 0500 01/03/20 0801   BP: 112/68 96/68  121/72   BP Location: Left arm Left arm  Left arm   Pulse: 78 98  89   Resp: 16 15  18   Temp: 97 6 °F (36 4 °C) (!) 97 2 °F (36 2 °C)  (!) 97 1 °F (36 2 °C)   TempSrc: Temporal Temporal     SpO2: (!) 89% 91% 95%    Weight:       Height:         Appearance:  age appropriate, casually dressed and overweight older than stated age, appears better groomed and combed hair and pleasant   Behavior:  evasive and guarded with no psychosomatic complaints, friendly but argumentative at times   Speech:  normal pitch and normal volume but circumstantial and tangential with stilted speech    Mood:  anxious and dysthymic   Affect:  mood-congruent, elated and entitled anxious and irritated and sad at times but pleasant today    Thought Process:  goal directed and illogical slightly pressured and tangential and talks as if in a court of law   Thought Content:  No current suicidal homicidal thoughts intent or plans reported  No phobias obsessions or compulsions reported  Still expresses some paranoia about certain staff tampering with her inhaler    No overt delusions elicited today but continues to have occasional psychosomatic symptoms like having breathing difficulty and swallowing difficulty   Perceptual Disturbances: None and does not appear responding to internal stimuli    Risk Potential: Tendency to not care for herself    Sensorium:  person, place, time/date, situation, day of week, month of year and time   Cognition:  grossly intact with no deficits in recent or remote memory or immediate recall   Consciousness:  alert and awake    Attention: Intact concentration and attention span   Intellect: Considered to be at least of average intelligence   Insight:  limited but improving   Judgment: improving      Motor Activity: no abnormal movements         Recent Labs:  Results Reviewed     None          I/O Past 24 hours:  No intake/output data recorded  No intake/output data recorded  Assessment / Plan:     Schizoaffective disorder, [facility] ([facility] )      Reason for continued inpatient care:  Because of underlying paranoia and inability to care for herself on her own and multiple somatic complaints  Acceptance by patient:  Accepting  Patsy Benavidez in recovery:  About living in same personal care home at the [place] once she feels better  Understanding of medications :  Has some understanding  Involved in reintegration process: On off unit privileges now  Trusting in relatoinship with psychiatrist:  Trusting    Recommended Treatment:    Medication changes:  1) none today  Non-pharmacological treatments  1) continue with  individual therapy group therapy, milieu therapy and occupational therapy and milieu therapy involving multidisciplinary team approach with psychotherapist, case management, nursing, peer support services, art therapist etc using recovery principles and psycho-education about accepting illness and need for treatment     2) encourage to cooperate with behavior plan  3) encourage to attend to ADLs like taking showers and wearing clean clothes   4) Encourage to attend groups   5) see how she does on off unit privileges and encourage to go off unit with staff escort  and expectations discussed with her and she did verbalize an understanding, consult respiratory to see if she needs portable oxygen and their findings are still pending  Safety  1) Safety/communication plan established targeting dynamic risk factors above  Discharge Plan back to the [facility] with act services and withdraw the [facility] referral due to improvement made so far  Counseling / Coordination of Care    Total floor / unit time spent today 15 minutes  Greater than 50% of total time was spent with the patient and / or family counseling and / or coordination of care  A description of the counseling / coordination of care  Patient's Rights, confidentiality and exceptions to confidentiality, use of automated medical record, [facility] staff access to medical record, and consent to treatment reviewed      Jaz Beasley MD

## 2020-01-03 NOTE — PLAN OF CARE
Problem: Alteration in Thoughts and Perception  Goal: Treatment Goal: Gain control of psychotic behaviors/thinking, reduce/eliminate presenting symptoms and demonstrate improved reality functioning upon discharge  Outcome: Progressing  Goal: Verbalize thoughts and feelings  Description  Interventions:  - Promote a nonjudgmental and trusting relationship with the patient through active listening and therapeutic communication  - Assess patient's level of functioning, behavior and potential for risk  - Engage patient in 1 on 1 interactions for a minimum of 15 minutes each session  - Encourage patient to express fears, feelings, frustrations, and discuss symptoms    - [place] patient to reality, help patient recognize reality-based thinking   - Administer medications as ordered and assess for potential side effects  - Provide the patient education related to the signs and symptoms of the illness and desired effects of prescribed medications  Outcome: Progressing  Goal: Agree to be compliant with medication regime, as prescribed and report medication side effects  Description  Interventions:  - Offer appropriate PRN medication and supervise ingestion; conduct aims, as needed   Outcome: Progressing  Goal: Attend and participate in unit activities, including therapeutic, recreational, and educational groups  Description  Interventions:  - Provide therapeutic and educational activities daily, encourage attendance and participation, and document same in the medical record     CERTIFIED PEER SPECIALIST INTERVENTIONS:    Complete peer assessment with patient to assess their needs and identify their goals to complete while in the recovery program as well as once discharged into the community  Patient will complete WRAP Plan, Crisis Plan and [employer]  Patient will attend 50% of groups offered on the unit  Patient will complete a goal card weekly      Outcome: Progressing     Problem: Ineffective Coping  Goal: Participates in unit activities  Description  Interventions:  - Provide therapeutic environment   - Provide required programming   - Redirect inappropriate behaviors   Outcome: Progressing  Goal: Patient/Family verbalizes awareness of resources  Outcome: Progressing  Goal: Understands least restrictive measures  Description  Interventions:  - Utilize least restrictive behavior  Outcome: Progressing     Problem: Risk for Self Injury/Neglect  Goal: Treatment Goal: Remain safe during length of stay, learn and adopt new coping skills, and be free of self-injurious ideation, impulses and acts at the time of discharge  Outcome: Progressing  Goal: Verbalize thoughts and feelings  Description  Interventions:  - Assess and re-assess patient's lethality and potential for self-injury  - Engage patient in 1:1 interactions, daily, for a minimum of 15 minutes  - Encourage patient to express feelings, fears, frustrations, hopes  - Establish rapport/trust with patient   Outcome: Progressing  Goal: Refrain from harming self  Description  Interventions:  - Monitor patient closely, per order  - Develop a trusting relationship  - Supervise medication ingestion, monitor effects and side effects   Outcome: Progressing  Goal: Attend and participate in unit activities, including therapeutic, recreational, and educational groups  Description  Interventions:  - Provide therapeutic and educational activities daily, encourage attendance and participation, and document same in the medical record  - Obtain collateral information, encourage visitation and family involvement in care   Outcome: Progressing     Problem: Depression  Goal: Treatment Goal: Demonstrate behavioral control of depressive symptoms, verbalize feelings of improved mood/affect, and adopt new coping skills prior to discharge  Outcome: Progressing  Goal: Verbalize thoughts and feelings  Description  Interventions:  - Assess and re-assess patient's level of risk   - Engage patient in 1:1 interactions, daily, for a minimum of 15 minutes   - Encourage patient to express feelings, fears, frustrations, hopes   Outcome: Progressing  Goal: Refrain from isolation  Description  Interventions:  - Develop a trusting relationship   - Encourage socialization   Outcome: Progressing     Problem: Anxiety  Goal: Anxiety is at manageable level  Description  Interventions:  - Assess and monitor patient's anxiety level  - Monitor for signs and symptoms of anxiety both physical and emotional (heart palpitations, chest pain, shortness of breath, headaches, nausea, feeling jumpy, restlessness, irritable, apprehensive)  - Collaborate with interdisciplinary team and initiate plan and interventions as ordered    - [place] patient to unit/surroundings  - Explain treatment plan  - Encourage participation in care  - Encourage verbalization of concerns/fears  - Identify coping mechanisms  - Assist in developing anxiety-reducing skills  - Administer/offer alternative therapies  - Limit or eliminate stimulants  Outcome: Progressing     Problem: Alteration in Orientation  Goal: Interact with staff daily  Description  Interventions:  - Assess and re-assess patient's level of orientation  - Engage patient in 1 on 1 interactions, daily, for a minimum of 15 minutes   - Establish rapport/trust with patient   Outcome: Progressing     Problem: PAIN - ADULT  Goal: Verbalizes/displays adequate comfort level or baseline comfort level  Description  Interventions:  - Encourage patient to monitor pain and request assistance  - Assess pain using appropriate pain scale  - Administer analgesics based on type and severity of pain and evaluate response  - Implement non-pharmacological measures as appropriate and evaluate response  - Consider cultural and social influences on pain and pain management  - Notify physician/advanced practitioner if interventions unsuccessful or patient reports new pain  Outcome: Progressing     Problem: SAFETY ADULT  Goal: Patient will remain free of falls  Description  INTERVENTIONS:  - Assess patient frequently for physical needs  -  Identify cognitive and physical deficits and behaviors that affect risk of falls  -  [place] fall precautions as indicated by assessment   - Educate patient/family on patient safety including physical limitations  - Instruct patient to call for assistance with activity based on assessment  - Modify environment to reduce risk of injury  - Consider OT/PT consult to assist with strengthening/mobility  Outcome: Progressing     Problem: Nutrition/Hydration-ADULT  Goal: Nutrient/Hydration intake appropriate for improving, restoring or maintaining nutritional needs  Description  Monitor and assess patient's nutrition/hydration status for malnutrition  Collaborate with interdisciplinary team and initiate plan and interventions as ordered  Monitor patient's weight and dietary intake as ordered or per policy  Utilize nutrition screening tool and intervene as necessary  Determine patient's food preferences and provide high-protein, high-caloric foods as appropriate  INTERVENTIONS:  - Monitor oral intake, urinary output, labs, and treatment plans  - Assess nutrition and hydration status and recommend course of action  - Evaluate amount of meals eaten  - Assist patient with eating if necessary   - Allow adequate time for meals  - Recommend/ encourage appropriate diets, oral nutritional supplements, and vitamin/mineral supplements  - Order, calculate, and assess calorie counts as needed  - Recommend, monitor, and adjust tube feedings and TPN/PPN based on assessed needs  - Assess need for intravenous fluids  - Provide specific nutrition/hydration education as appropriate  - Include patient/family/caregiver in decisions related to nutrition  Outcome: Progressing   Visible intermittently, pleasant and friendly with others while in the milieu, attended IMR group   No somatic complaints voiced this shift  Med and meal compliant  Ate 75% of breakfast and 25% of lunch  No issues noted  Continue to monitor

## 2020-01-03 NOTE — PROGRESS NOTES
Pharmacy Clozapine Monitoring Progress Note     Efraín Ruth is receiving a total daily dose of 150 mg of clozapine divided as 75mg at 1600 and 75mg at 2000  Pharmacist Recommendations Based on Assessment and Plan (below):    1  Patient is Clozapine-REMS eligible, ANC was updated, with weekly monitoring frequency and the next [address] is due on 1/10/20 (ordered)  Assessment/Plan:    Phase of Treatment:     Current phase of treatment is initation/titration  Patient Clozapine REMS Eligibility:     Patient is eligible to receive clozapine and the [address] monitoring frequency is weekly per clozapine REMS  The patient's latest [address] value has been updated into the Clozapine-REMS program          ANC and Clozapine Level (if available)     The most recent [address] was   Lab Results   Component Value Date    NEUTROABS 3 70 01/03/2020    and the next lab is due on 1/10/20  Last Clozapine level (if available):    0   Lab Value Date/Time    CLOZAPINE 324 (L) 08/16/2019 1131     0   Lab Value Date/Time    NCLOZIP 207 08/16/2019 1131           Monitoring Parameters for Clozapine:     Clozapine Adverse Effect Suggested Monitoring Patient's Current Status    Agranulocytosis ANC baseline and then repeat weekly for first 6 months and every 2 weeks for the second 6 months  Maintenance after one year of therapy every month  The most recent [address] was   Lab Results   Component Value Date    NEUTROABS 3 70 01/03/2020       This ANC is considered normal range (ANC >/= 1500/mcL)  Continue current treatment and monitoring course  Respiratory Depression Please ensure patient is not on concomitant respiratory lowering medications such as benzodiazepines, sedative hypnotics (eg  zolpidem) This patient is not on respiratory depression lowering medications   Myocarditis Baseline and weekly EKG, CRP, BNP, and troponin    Weekly symptomatic complaints of fatigue, dyspnea, tachycardia, chest pain, palpitations, and fever for the first 4 weeks  Last EKG Results on  12/11/19 showed: "Normal sinus rhythm  Left axis deviation  Abnormal ECG  When compared with ECG of 18-NOV-2019 13:19,  Premature ventricular complexes are no longer Present  Confirmed by Nick Ramos ([id number]) on 12/12/2019 8:45:13 AM"        Last QTC value was:  0   Lab Value Date/Time    QTCINT 467 12/11/2019 2041       Last BNP was: No results found for: NTBNP    Last Troponin was:  0   Lab Value Date/Time    TROPONINI <0 01 05/01/2019 1318    TROPONINI <0 04 05/10/2015 1948        Orthostatic hypotension/  bradycardia Blood pressure and vital signs      Monitor blood pressure and orthostatic hypotension at least twice daily upon initiation and continue to follow at least once daily afterwards  Patient's last recorded vital signs:       /72 (BP Location: Left arm) Comment: 108/60 pulse 77  Pulse 89   Temp (!) 97 1 °F (36 2 °C)   Resp 18   Ht 5' 4" (1 626 m)   Wt 66 kg (145 lb 9 6 oz)   SpO2 95%   BMI 24 99 kg/m²             Constipation (bowel obstruction due to high anticholinergic clozapine burden) Assess at baseline and weekly during first four months of therapy  Docusate 100mg BID and Miralax 17 grams daily recommended initially  Prophylactic bowel regimen ordered and includes: sucralfate   Sialorrhea Assess at baseline and weekly during first four months of therapy  May be managed using sublingual anticholinergic preparations (atropine 1% eye drops)  Please note that per current REMS protocol the provider can submit a treatment rationale that justifies clozapine treatment even if patient parameters are outside of REMS recommendations  The Clozapine REMS is an FDA-mandated program implemented by the manufacturers of clozapine  ([employer]  com/CpmgClozapineUI/home  u)  Pharmacy will continue to follow patient with team, thank you    Electronically signed by: Agustin Andrew, CrystalD, [address], Clinical Pharmacist - Psychiatry

## 2020-01-04 NOTE — PROGRESS NOTES
Progress Note - Behavioral Health   Nolberto Galvin 60 y o  female MRN: [id number]  [facility] Encounter: [id number]    The patient was seen for continuing care and reviewed with treatment team  Staff reports that the patient remains calm, cooperative, but guarded and minimally verbal with staff  During assessment the patient remains difficult to engage in conversation  Denies any thoughts to hurt herself or others  Remains somatically preoccupied  Denies any auditory or visual hallucinations  Reports ongoing compliance with medication  Continues to refuse showers, but appetite has improved  Will continue treatment as ordered  Mental Status Evaluation:  Appearance:  Marginal/poor hygiene   Behavior:  cooperative, guarded, Superficial and Dismissive   Fund of knowledge  Not assessed   Speech:   Language: Sparse  No overt abnormality   Mood:  depressed; withdrawn   Affect:   Associations: blunted  Intact   Thought Process:  Circumstantial   Thought Content:  Paranoid and mistrustful, Obsessive ruminations and is somatically preoccupied   Perceptual Disturbances: Uncertain   Risk Potential: No suicidal or homicidal ideation   Orientation  Oriented to place and person   Memory Not tested   Attention/Concentration attention span appeared shorter than expected for age   Insight:  limited   Judgment: Limited   Gait/Station: normal gait/station and normal balance   Motor Activity: No abnormal movement noted     Progress Toward Goals: unchanged    Assessment/Plan    Principal Problem:    Schizoaffective disorder, bipolar type ([address] )  Active Problems:    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[address] )    Left hip pain    Acquired hypothyroidism    Gastroesophageal reflux disease without esophagitis    At risk for aspiration    Ear ache      Recommended Treatment: Continue with pharmacotherapy, group therapy, milieu therapy and occupational therapy    The patient will be maintained on the following medications:    Current Facility-Administered Medications:  acetaminophen 650 mg Oral Q6H PRN Jill Gayle MD   albuterol 2 puff Inhalation Q4H PRN Jill Gayle MD   aluminum-magnesium hydroxide-simethicone 15 mL Oral Q4H PRN Jill Gayle MD   ammonium lactate 1 application Topical BID PRN Jill Gayle MD   benzonatate 100 mg Oral TID PRN Jill Gayle MD   benztropine 1 mg Intramuscular Q8H PRN Jill Gayle MD   carbamide peroxide 5 drop Left Ear BID Rona Mcintosh MD   cloZAPine 25 mg Oral BID Jill Gayle MD   cloZAPine 50 mg Oral BID Jill Gayle MD   EPINEPHrine PF 0 15 mg Intramuscular Once PRN Jill Gayle MD   fluticasone-vilanterol 1 puff Inhalation Daily Jill Gayle MD   ketotifen 1 drop Right Eye BID PRN Jill Gayle MD   levothyroxine 125 mcg Oral Early Morning Jill Gayle MD   magnesium hydroxide 30 mL Oral Daily PRN Jill Gayle MD   montelukast 10 mg Oral HS Jill Gayle MD   OLANZapine 5 mg Intramuscular Q8H PRN Jill Gayle MD   OLANZapine 5 mg Oral Q8H PRN Jill Gayle MD   ondansetron 4 mg Oral Q6H PRN Jill Gayle MD   pantoprazole 40 mg Oral Early Morning Jill Gayle MD   polyethylene glycol 17 g Oral Daily PRN Jill Gayle MD   polyvinyl alcohol 1 drop Both Eyes Q3H PRN Jill Gayle MD   sertraline 50 mg Oral BID Jill Gayle MD   sucralfate 1,000 mg Oral BID Howard Russ MD   theophylline 200 mg Oral Daily Jill Gayle MD   tiotropium 18 mcg Inhalation Daily Jill Gayle MD   traZODone 25 mg Oral HS PRN Jill Gayle MD       Risks, benefits and possible side effects of Medications:   Risks, benefits, and possible side effects of medications explained to patient and patient verbalizes understanding

## 2020-01-04 NOTE — PLAN OF CARE
Problem: Alteration in Thoughts and Perception  Goal: Agree to be compliant with medication regime, as prescribed and report medication side effects  Description  Interventions:  - Offer appropriate PRN medication and supervise ingestion; conduct aims, as needed   Outcome: Progressing  Goal: Attend and participate in unit activities, including therapeutic, recreational, and educational groups  Description  Interventions:  - Provide therapeutic and educational activities daily, encourage attendance and participation, and document same in the medical record     CERTIFIED PEER SPECIALIST INTERVENTIONS:    Complete peer assessment with patient to assess their needs and identify their goals to complete while in the recovery program as well as once discharged into the community  Patient will complete WRAP Plan, Crisis Plan and 5 Life Domains  Patient will attend 50% of groups offered on the unit  Patient will complete a goal card weekly  Outcome: Progressing  Goal: Complete daily ADLs, including personal hygiene independently, as able  Description  Interventions:  - Observe, teach, and assist patient with ADLS  - Monitor and promote a balance of rest/activity, with adequate nutrition and elimination   Outcome: Corrie Tolliver has been intermittently visible on the unit, only coming out for medications and meals  No somatic complaints this shift  1250 incentive spirometer  Spent most of her time napping in bed  Breakfast - half of her eggs, half of sausage, and oatmeal  Terrilyn Puna, potato salad and 100% and cookies for lunch  Will continue to monitor

## 2020-01-04 NOTE — PLAN OF CARE
Problem: PAIN - ADULT  Goal: Verbalizes/displays adequate comfort level or baseline comfort level  Description  Interventions:  - Encourage patient to monitor pain and request assistance  - Assess pain using appropriate pain scale  - Administer analgesics based on type and severity of pain and evaluate response  - Implement non-pharmacological measures as appropriate and evaluate response  - Consider cultural and social influences on pain and pain management  - Notify physician/advanced practitioner if interventions unsuccessful or patient reports new pain  Outcome: Progressing     Problem: SAFETY ADULT  Goal: Patient will remain free of falls  Description  INTERVENTIONS:  - Assess patient frequently for physical needs  -  Identify cognitive and physical deficits and behaviors that affect risk of falls  -  [place] fall precautions as indicated by assessment   - Educate patient/family on patient safety including physical limitations  - Instruct patient to call for assistance with activity based on assessment  - Modify environment to reduce risk of injury  - Consider OT/PT consult to assist with strengthening/mobility  Outcome: Progressing     Problem: SLEEP DISTURBANCE  Goal: Will exhibit normal sleeping pattern  Description  Interventions:  -  Assess the patients sleep pattern, noting recent changes  - Administer medication as ordered  - Decrease environmental stimuli, including noise, as appropriate during the night  - Encourage the patient to actively participate in unit groups and or exercise during the day to enhance ability to achieve adequate sleep at night  - Assess the patient, in the morning, encouraging a description of sleep experience  Outcome: Progressing   Pt in bed quietly resting, eyes closed, chest noted to be rising, audible breath sounds , will monitor for change in behavior/assessment,  o 2 at 1 l/min no respiratory distress noted , will monitor for change in behavior/assessment

## 2020-01-04 NOTE — PLAN OF CARE
Problem: Alteration in Thoughts and Perception  Goal: Agree to be compliant with medication regime, as prescribed and report medication side effects  Description  Interventions:  - Offer appropriate PRN medication and supervise ingestion; conduct aims, as needed   Outcome: Progressing     Problem: Alteration in Thoughts and Perception  Goal: Attend and participate in unit activities, including therapeutic, recreational, and educational groups  Description  Interventions:  - Provide therapeutic and educational activities daily, encourage attendance and participation, and document same in the medical record     CERTIFIED PEER SPECIALIST INTERVENTIONS:    Complete peer assessment with patient to assess their needs and identify their goals to complete while in the recovery program as well as once discharged into the community  Patient will complete WRAP Plan, Crisis Plan and 5 Life Domains  Patient will attend 50% of groups offered on the unit  Patient will complete a goal card weekly  Outcome: Not Progressing  Goal: Complete daily ADLs, including personal hygiene independently, as able  Description  Interventions:  - Observe, teach, and assist patient with ADLS  - Monitor and promote a balance of rest/activity, with adequate nutrition and elimination   Outcome: Not Progressing     Problem: Depression  Goal: Refrain from isolation  Description  Interventions:  - Develop a trusting relationship   - Encourage socialization   Outcome: Not Progressing     [address] has been visible @ brief intervals in dining room after eating, in arrieta chair @ phone when it is not in use  Pleasant, inquisitive  No somatic complaints offered tonight  Did her Incentive Spirometry reaching volumes of 1250ml  For meal ate all egg salad w/crackers, ice cream, Ensure  Also had HS snack  Was medicine compliant except the [place]  Did not attend PM Game Group  Did not attend to any hygiene tasks   Wearing now her QHS humidified nasal O2 @ 1L for bed

## 2020-01-05 NOTE — PLAN OF CARE
Problem: Alteration in Thoughts and Perception  Goal: Verbalize thoughts and feelings  Description  Interventions:  - Promote a nonjudgmental and trusting relationship with the patient through active listening and therapeutic communication  - Assess patient's level of functioning, behavior and potential for risk  - Engage patient in 1 on 1 interactions for a minimum of 15 minutes each session  - Encourage patient to express fears, feelings, frustrations, and discuss symptoms    - [place] patient to reality, help patient recognize reality-based thinking   - Administer medications as ordered and assess for potential side effects  - Provide the patient education related to the signs and symptoms of the illness and desired effects of prescribed medications  Outcome: Progressing  Goal: Agree to be compliant with medication regime, as prescribed and report medication side effects  Description  Interventions:  - Offer appropriate PRN medication and supervise ingestion; conduct aims, as needed   Outcome: Progressing  Goal: Attend and participate in unit activities, including therapeutic, recreational, and educational groups  Description  Interventions:  - Provide therapeutic and educational activities daily, encourage attendance and participation, and document same in the medical record     CERTIFIED PEER SPECIALIST INTERVENTIONS:    Complete peer assessment with patient to assess their needs and identify their goals to complete while in the recovery program as well as once discharged into the community  Patient will complete WRAP Plan, Crisis Plan and 5 Life Domains  Patient will attend 50% of groups offered on the unit  Patient will complete a goal card weekly      Outcome: Progressing     Problem: Depression  Goal: Refrain from isolation  Description  Interventions:  - Develop a trusting relationship   - Encourage socialization   Outcome: Progressing     Problem: RESPIRATORY - ADULT  Goal: Achieves optimal ventilation and oxygenation  Description  INTERVENTIONS:  - Assess for changes in respiratory status  - Assess for changes in mentation and behavior  - Position to facilitate oxygenation and minimize respiratory effort  - Oxygen administration by appropriate delivery method based on oxygen saturation (per order) or ABGs  - Initiate smoking cessation education as indicated  - Encourage broncho-pulmonary hygiene including cough, deep breathe, Incentive Spirometry  - Assess the need for suctioning and aspirate as needed  - Assess and instruct to report SOB or any respiratory difficulty  - Respiratory Therapy support as indicated  Outcome: Progressing     Problem: Nutrition/Hydration-ADULT  Goal: Nutrient/Hydration intake appropriate for improving, restoring or maintaining nutritional needs  Description  Monitor and assess patient's nutrition/hydration status for malnutrition  Collaborate with interdisciplinary team and initiate plan and interventions as ordered  Monitor patient's weight and dietary intake as ordered or per policy  Utilize nutrition screening tool and intervene as necessary  Determine patient's food preferences and provide high-protein, high-caloric foods as appropriate       INTERVENTIONS:  - Monitor oral intake, urinary output, labs, and treatment plans  - Assess nutrition and hydration status and recommend course of action  - Evaluate amount of meals eaten  - Assist patient with eating if necessary   - Allow adequate time for meals  - Recommend/ encourage appropriate diets, oral nutritional supplements, and vitamin/mineral supplements  - Order, calculate, and assess calorie counts as needed  - Recommend, monitor, and adjust tube feedings and TPN/PPN based on assessed needs  - Assess need for intravenous fluids  - Provide specific nutrition/hydration education as appropriate  - Include patient/family/caregiver in decisions related to nutrition  Outcome: Progressing     [address] has been more visible, outgoing this evening  Has spent time out in dining room where chats w/staff or peers @ her table  She is pleasant, but, verbal of antagonism between herelf & roommate; elects to no longer talk to her  For meal ate meatloaf, mashed potato, stuffed tomato & Ensure  For HS snack had banana, potato chips, milk  Was medicine compliant except the [place]  Took part in PM Game Group social by working a crossword puzzle book, socializing  Used the [employer] achieving 1250ml volume  Wearing now her QHS humidified nasal O2 @ 1L for bed  Remains disheveled, no attention to hygiene tonight

## 2020-01-05 NOTE — PROGRESS NOTES
Progress Note - Behavioral Health   Gigi Jenkins 60 y o  female MRN: [id number]  [facility]-49 Encounter: [id number]    The patient was seen for continuing care  She is bright and friendly on approach  Reports she has fluctuating depression and anxiety but today she is feeling good  Denies SI  Sleep and appetite have been good  Did not verbalize delusional material   States she is hopeful for discharge sometime soon back to [facility]  She is happy with her current medication regimen although she says it makes her have to drink more water to keep her blood pressure up  She is on clock spina and a level is in process  She is on weekly CBC and ANC has been stable  Current Mental Status Evaluation:  Appearance:  Good eye contact and disheveled   Behavior:  calm, cooperative and friendly   Mood:  euthymic   Affect: appropriate   Speech: Normal rate and Normal volume   Thought Process:  Goal directed and coherent   Thought Content:  Does not verbalize delusional material   Perceptual Disturbances: Denies hallucinations and does not appear to be responding to internal stimuli   Risk Potential: No suicidal or homicidal ideation   Orientation:   Oriented x3     Progress Toward Goals:  No significant change in the last 24 hours  Principal Problem:    Schizoaffective disorder, bipolar type ([facility] [address] )  Active Problems:    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[facility] [address] )    Left hip pain    Acquired hypothyroidism    Gastroesophageal reflux disease without esophagitis    At risk for aspiration    Ear ache      Recommended Treatment: Continue with pharmacotherapy, group therapy, milieu therapy and occupational therapy    The patient will be maintained on the following medications:    Current Facility-Administered Medications:  acetaminophen 650 mg Oral Q6H PRN Faheem Daniels MD   albuterol 2 puff Inhalation Q4H PRN Faheem Daniels MD   aluminum-magnesium hydroxide-simethicone 15 mL Oral Q4H PRN Faheem Daniels MD   ammonium lactate 1 application Topical BID PRN Deep Durand MD   benzonatate 100 mg Oral TID PRN Deep Durand MD   benztropine 1 mg Intramuscular Q8H PRN Deep Durand MD   carbamide peroxide 5 drop Left Ear BID Rona Nogueira MD   cloZAPine 25 mg Oral BID Deep Durand MD   cloZAPine 50 mg Oral BID Deep Durand MD   EPINEPHrine PF 0 15 mg Intramuscular Once PRN Deep Durand MD   fluticasone-vilanterol 1 puff Inhalation Daily Deep Durand MD   ketotifen 1 drop Right Eye BID PRN Deep Durand MD   levothyroxine 125 mcg Oral Early Morning Deep Durand MD   magnesium hydroxide 30 mL Oral Daily PRN Deep Durand MD   montelukast 10 mg Oral HS Deep Durand MD   OLANZapine 5 mg Intramuscular Q8H PRN Deep Durand MD   OLANZapine 5 mg Oral Q8H PRN Deep Durand MD   ondansetron 4 mg Oral Q6H PRN Deep Durand MD   pantoprazole 40 mg Oral Early Morning Deep Durand MD   polyethylene glycol 17 g Oral Daily PRN Deep Durand MD   polyvinyl alcohol 1 drop Both Eyes Q3H PRN Deep Durand MD   sertraline 50 mg Oral BID Deep Durand MD   sucralfate 1,000 mg Oral BID Rosario Jay MD   theophylline 200 mg Oral Daily Deep Durand MD   tiotropium 18 mcg Inhalation Daily Deep Durand MD   traZODone 25 mg Oral HS PRN Deep Durand MD

## 2020-01-06 NOTE — PROGRESS NOTES
[facility] GROUP NOTE     01/06/20 1100   Activity/Group Checklist   Group Life Skills   Attendance Attended   Attendance Duration (min) 16-30   Interactions Interacted appropriately   Affect/Mood Appropriate   Goals Achieved Able to listen to others; Able to engage in interactions

## 2020-01-06 NOTE — PLAN OF CARE
Problem: Alteration in Thoughts and Perception  Goal: Complete daily ADLs, including personal hygiene independently, as able  Description  Interventions:  - Observe, teach, and assist patient with ADLS  - Monitor and promote a balance of rest/activity, with adequate nutrition and elimination   Outcome: Progressing as per chart and nursing review; pt shower, bed linen changed  Received pt in bed at change of shift with eyes closed; chest movement noted  Continues the same thus this far as per q 15 min room checks  Will continue to monitor

## 2020-01-06 NOTE — PLAN OF CARE
Problem: Alteration in Thoughts and Perception  Goal: Verbalize thoughts and feelings  Description  Interventions:  - Promote a nonjudgmental and trusting relationship with the patient through active listening and therapeutic communication  - Assess patient's level of functioning, behavior and potential for risk  - Engage patient in 1 on 1 interactions for a minimum of 15 minutes each session  - Encourage patient to express fears, feelings, frustrations, and discuss symptoms    - [place] patient to reality, help patient recognize reality-based thinking   - Administer medications as ordered and assess for potential side effects  - Provide the patient education related to the signs and symptoms of the illness and desired effects of prescribed medications  Outcome: Progressing  Goal: Attend and participate in unit activities, including therapeutic, recreational, and educational groups  Description  Interventions:  - Provide therapeutic and educational activities daily, encourage attendance and participation, and document same in the medical record     CERTIFIED PEER SPECIALIST INTERVENTIONS:    Complete peer assessment with patient to assess their needs and identify their goals to complete while in the recovery program as well as once discharged into the community  Patient will complete WRAP Plan, Crisis Plan and 5 Life Domains  Patient will attend 50% of groups offered on the unit  Patient will complete a goal card weekly  Outcome: Progressing    Individual was in the milieu for meal times and she attended [facility] group  She did not verbalize any issues or concerns  She did not verbalize any somatic complaints  Appetite is fair, but she feels she is doing better with consumption  Minimal interactions with others  Will continue to monitor

## 2020-01-06 NOTE — PROGRESS NOTES
01/06/20 0900   Team Meeting   Meeting Type Daily Rounds   Team Members Present   Team Members Present Physician;Nurse;; Other (Discipline and Name)   Physician Team Member Dr Hi Rubio Team Member Keyona Garcia RN   Care Management Team Member Brenda Vizcarra   Other (Discipline and Name) Moisés Greene LCSW     Patient was visible yesterday morning but then isolative in the afternoon  Reports concerns for her brother who is caring for an ill spouse  Slept

## 2020-01-06 NOTE — PROGRESS NOTES
Progress Note - Nolberto Galvin 1955, 60 y o  female MRN: [id number]    Unit/Bed#: [facility] 110-02 Encounter: [id number]    Primary Care Provider: Aspen Metzger PA-C   Date and time admitted to hospital: 7/23/2019  5:30 PM        * Schizoaffective disorder, bipolar type [facility])  Assessment & Plan  Psychiatry Progress Note  Patient seems to be in better spirits today and is not fixated on getting the portable oxygen now as she did not bring it up in today's interview  She is now happy that she is taken off the waiting list for [facility] because of remarkable improvement   She is no longer voicing any thoughts about her having cancer or dying from terrible illnesses  and still questions her medications sometimes but periodically complains about swallowing difficulties and breathing difficulty and some other psychosomatic symptoms off and on  Her hygiene is better she is well groomed and well kept today     She is wanting to go back to the [facility]  and I did remind her that involves keeping up with her hygiene like taking shower twice a week and attending 50% of groups and she has agreed to comply  She is still occasionally suspicious about certain staff tampering with her spirometer and oxygen concentrator off and on which has not changed much       Current medications:    Current Facility-Administered Medications:     acetaminophen (TYLENOL) tablet 650 mg, 650 mg, Oral, Q6H PRN, Dalton Garner MD    albuterol (PROVENTIL HFA,VENTOLIN HFA) inhaler 2 puff, 2 puff, Inhalation, Q4H PRN, Dalton Garner MD, 2 puff at 10/11/19 0424    aluminum-magnesium hydroxide-simethicone (MYLANTA) 200-200-20 mg/5 mL oral suspension 15 mL, 15 mL, Oral, Q4H PRN, Dalton Garner MD    ammonium lactate (LAC-HYDRIN) 12 % lotion 1 application, 1 application, Topical, BID PRN, Dalton Garner MD    benzonatate (TESSALON PERLES) capsule 100 mg, 100 mg, Oral, TID PRN, Dalton Garner MD    benztropine (COGENTIN) injection 1 mg, 1 mg, Intramuscular, Q8H PRN, Jerome Lopez MD    carbamide peroxide (DEBROX) 6 5 % otic solution 5 drop, 5 drop, Left Ear, BID, Rona Cervantes MD, 5 drop at 01/06/20 7136    cloZAPine (CLOZARIL) tablet 25 mg, 25 mg, Oral, BID, Jerome Lopez MD, 25 mg at 01/05/20 2144    cloZAPine (CLOZARIL) tablet 50 mg, 50 mg, Oral, BID, Jerome Lopez MD, 50 mg at 01/05/20 2144    EPINEPHrine PF (ADRENALIN) 1 mg/mL injection 0 15 mg, 0 15 mg, Intramuscular, Once PRN, Jerome Lopez MD    fluticasone-vilanterol (BREO ELLIPTA) 200-25 MCG/INH inhaler 1 puff, 1 puff, Inhalation, Daily, Jerome Lopez MD, 1 puff at 01/06/20 0834    ketotifen (ZADITOR) 0 025 % ophthalmic solution 1 drop, 1 drop, Right Eye, BID PRN, Jerome Loepz MD    levothyroxine tablet 125 mcg, 125 mcg, Oral, Early Morning, Jerome Lopez MD, 125 mcg at 01/06/20 0554    magnesium hydroxide (MILK OF MAGNESIA) 400 mg/5 mL oral suspension 30 mL, 30 mL, Oral, Daily PRN, Jerome Lopez MD    montelukast (SINGULAIR) tablet 10 mg, 10 mg, Oral, HS, Jerome Lopez MD, 10 mg at 12/24/19 2109    OLANZapine (ZyPREXA) IM injection 5 mg, 5 mg, Intramuscular, Q8H PRN, Jerome Lopez MD    OLANZapine (ZyPREXA) tablet 5 mg, 5 mg, Oral, Q8H PRN, Jerome Lopez MD    ondansetron (ZOFRAN-ODT) dispersible tablet 4 mg, 4 mg, Oral, Q6H PRN, Jerome Lopez MD, 4 mg at 12/09/19 1757    pantoprazole (PROTONIX) EC tablet 40 mg, 40 mg, Oral, Early Morning, Jerome Lopez MD, 40 mg at 01/06/20 0554    polyethylene glycol (MIRALAX) packet 17 g, 17 g, Oral, Daily PRN, Jerome Lopez MD    polyvinyl alcohol (LIQUIFILM TEARS) 1 4 % ophthalmic solution 1 drop, 1 drop, Both Eyes, Q3H PRN, Jerome Lopez MD    sertraline (ZOLOFT) tablet 50 mg, 50 mg, Oral, BID, Jerome Lopez MD, 50 mg at 01/06/20 1869    sucralfate (CARAFATE) oral suspension 1,000 mg, 1,000 mg, Oral, BID, Cat Torres MD, 1,000 mg at 01/06/20 0600    theophylline (JEF-24) 24 hr capsule 200 mg, 200 mg, Oral, Daily, Jerome Lopez MD, 200 mg at 01/06/20 3436    tiotropium (SPIRIVA) capsule for inhaler 18 mcg, 18 mcg, Inhalation, Daily, Meredith Wesley MD, 18 mcg at 01/06/20 3606    traZODone (DESYREL) tablet 25 mg, 25 mg, Oral, HS PRN, Meredith Wesley MD  Justification if on more than two antipsychotics:  Only on his clozapine  Side effects if any:  None    Risks , benefits, side effects and precautions of medications discussed with patient and reminded patient to let us know any problems with medications     Objective:     Vital Signs:  Vitals:    01/05/20 1600 01/05/20 2145 01/06/20 0614 01/06/20 0700   BP: 116/80 95/52  101/69   BP Location: Right arm Left arm  Right arm   Pulse: 80 68  79   Resp: 18 17  18   Temp: 97 5 °F (36 4 °C) (!) 97 °F (36 1 °C)  98 1 °F (36 7 °C)   TempSrc: Temporal Temporal     SpO2: 98% 90% 92%    Weight:       Height:         Appearance:  age appropriate, casually dressed and overweight older than stated age, appears better groomed and combed hair and pleasant and not preoccupied about her breathing or need for portable oxygen   Behavior:  evasive and guarded with no psychosomatic complaints, friendly but argumentative at times   Speech:  normal pitch and normal volume but circumstantial and tangential with stilted speech    Mood:  anxious and dysthymic   Affect:  mood-congruent, elated and entitled anxious and irritated and sad at times but pleasant today    Thought Process:  goal directed and illogical slightly pressured and tangential and talks as if in a court of law   Thought Content:  No current suicidal homicidal thoughts intent or plans reported  No phobias obsessions or compulsions reported    No overt delusions elicited today but continues to have occasional psychosomatic symptoms like having breathing difficulty and swallowing difficulty and occasionally demands to go back on portable oxygen   Perceptual Disturbances: None and does not appear responding to internal stimuli    Risk Potential: Tendency to not care for herself    Sensorium:  person, place, time/date, situation, day of week, month of year and time   Cognition:  grossly intact with no deficits in recent or remote memory or immediate recall   Consciousness:  alert and awake    Attention: Intact concentration and attention span   Intellect: Considered to be at least of average intelligence   Insight:  limited but improving   Judgment: improving      Motor Activity: no abnormal movements         Recent Labs:  Results Reviewed     None          I/O Past 24 hours:  No intake/output data recorded  No intake/output data recorded  Assessment / Plan:     Schizoaffective disorder, bipolar type ([facility] [address] )      Reason for continued inpatient care:  Because of underlying paranoia and inability to care for herself on her own and multiple somatic complaints  Acceptance by patient:  Accepting  Quinn Velásquez in recovery:  About living in same personal care home at the [place] once she feels better  Understanding of medications :  Has some understanding  Involved in reintegration process: On off unit privileges now  Trusting in relatoinship with psychiatrist:  Trusting    Recommended Treatment:    Medication changes:  1) none today  Non-pharmacological treatments  1) continue with  individual therapy group therapy, milieu therapy and occupational therapy and milieu therapy involving multidisciplinary team approach with psychotherapist, case management, nursing, peer support services, art therapist etc using recovery principles and psycho-education about accepting illness and need for treatment     2) encourage to cooperate with behavior plan  3) encourage to attend to ADLs like taking showers and wearing clean clothes   4) Encourage to attend groups   5) see how she does on off unit privileges and encourage to go off unit with staff escort  and expectations discussed with her and she did verbalize an understanding, consult respiratory to see if she needs portable oxygen and their findings are still pending  Safety  1) Safety/communication plan established targeting dynamic risk factors above  Discharge Plan back to the [facility] with act services and withdraw the [facility] referral due to improvement made so far  Counseling / Coordination of Care    Total floor / unit time spent today 15 minutes  Greater than 50% of total time was spent with the patient and / or family counseling and / or coordination of care  A description of the counseling / coordination of care  Patient's Rights, confidentiality and exceptions to confidentiality, use of automated medical record, [facility] staff access to medical record, and consent to treatment reviewed      Isidoro Gonzalez MD

## 2020-01-06 NOTE — PLAN OF CARE
Problem: Alteration in Thoughts and Perception  Goal: Verbalize thoughts and feelings  Description  Interventions:  - Promote a nonjudgmental and trusting relationship with the patient through active listening and therapeutic communication  - Assess patient's level of functioning, behavior and potential for risk  - Engage patient in 1 on 1 interactions for a minimum of 15 minutes each session  - Encourage patient to express fears, feelings, frustrations, and discuss symptoms    - Wenden patient to reality, help patient recognize reality-based thinking   - Administer medications as ordered and assess for potential side effects  - Provide the patient education related to the signs and symptoms of the illness and desired effects of prescribed medications  Outcome: Progressing  Goal: Agree to be compliant with medication regime, as prescribed and report medication side effects  Description  Interventions:  - Offer appropriate PRN medication and supervise ingestion; conduct aims, as needed   Outcome: Progressing  Goal: Attend and participate in unit activities, including therapeutic, recreational, and educational groups  Description  Interventions:  - Provide therapeutic and educational activities daily, encourage attendance and participation, and document same in the medical record     CERTIFIED PEER SPECIALIST INTERVENTIONS:    Complete peer assessment with patient to assess their needs and identify their goals to complete while in the recovery program as well as once discharged into the community  Patient will complete WRAP Plan, Crisis Plan and 5 Life Domains  Patient will attend 50% of groups offered on the unit  Patient will complete a goal card weekly      Outcome: Progressing  Goal: Complete daily ADLs, including personal hygiene independently, as able  Description  Interventions:  - Observe, teach, and assist patient with ADLS  - Monitor and promote a balance of rest/activity, with adequate nutrition and elimination   Outcome: Progressing     [address] was motivated to do her shower, grooming w/help for setup & w/hair care  She was medicine compliant w/all but the Singulair  Ate 75% of meal, had HS snack  Used the [place] reaching volumes of 1100-1250ml  Did take part in PM Group  Expresses worries about her brother who is feeling unwell, but, is responsible for care of his much older partner; glad sister was going over to look in on them  Wearing now her QHS humidified nasal O2 @ 1L for bed

## 2020-01-06 NOTE — PLAN OF CARE
Problem: DISCHARGE PLANNING  Goal: Discharge to home or other facility with appropriate resources  Description  INTERVENTIONS:  - Conduct assessment to determine patient/family and health care team treatment goals, and need for post-acute services based on payer coverage, community resources, and patient preferences, and barriers to discharge  - Address psychosocial, clinical, and financial barriers to discharge as identified in assessment in conjunction with the patient/family and health care team  - Assist the patient in reintegration back into the community by removing barriers which may hinder a successful discharge once deemed stable  - Arrange appropriate level of post-acute services according to patient's needs and preference and payer coverage in collaboration with the physician and health care team  - Communicate with and update the patient/family, physician, and health care team regarding progress on the discharge plan  - Arrange appropriate transportation to post-acute venues    Outcome: Progressing     Dr Lopez Trinidad asked this CM to have patient taken off the [facility] list since she is doing much better and he is hopeful she will be able to return to the community, possibly to [facility] where she was residing prior to admission  CM sent an email to [facility] and Sultana Soto with [facility] informing them of the above information and asked if they will support a referral back to [facility] and if there are any beds available there  CM will await their reply  CM will continue to follow patient's progress and assist with discharge planning needs

## 2020-01-06 NOTE — ASSESSMENT & PLAN NOTE
Psychiatry Progress Note  Patient seems to be in better spirits today and is not fixated on getting the portable oxygen now as she did not bring it up in today's interview  She is now happy that she is taken off the waiting list for [facility] because of remarkable improvement   She is no longer voicing any thoughts about her having cancer or dying from terrible illnesses  and still questions her medications sometimes but periodically complains about swallowing difficulties and breathing difficulty and some other psychosomatic symptoms off and on  Her hygiene is better she is well groomed and well kept today     She is wanting to go back to the [facility]  and I did remind her that involves keeping up with her hygiene like taking shower twice a week and attending 50% of groups and she has agreed to comply  She is still occasionally suspicious about certain staff tampering with her spirometer and oxygen concentrator off and on which has not changed much       Current medications:    Current Facility-Administered Medications:     acetaminophen (TYLENOL) tablet 650 mg, 650 mg, Oral, Q6H PRN, Manuel Copeland MD    albuterol (PROVENTIL HFA,VENTOLIN HFA) inhaler 2 puff, 2 puff, Inhalation, Q4H PRN, Manuel Copeland MD, 2 puff at 10/11/19 0424    aluminum-magnesium hydroxide-simethicone (MYLANTA) 200-200-20 mg/5 mL oral suspension 15 mL, 15 mL, Oral, Q4H PRN, Manuel Copeland MD    ammonium lactate (LAC-HYDRIN) 12 % lotion 1 application, 1 application, Topical, BID PRN, Manuel Copeland MD    benzonatate (TESSALON PERLES) capsule 100 mg, 100 mg, Oral, TID PRN, Manuel Copeland MD    benztropine (COGENTIN) injection 1 mg, 1 mg, Intramuscular, Q8H PRN, Manuel Copeland MD    carbamide peroxide (DEBROX) 6 5 % otic solution 5 drop, 5 drop, Left Ear, BID, Rona Lindsay MD, 5 drop at 01/06/20 6579    cloZAPine (CLOZARIL) tablet 25 mg, 25 mg, Oral, BID, Manuel Copeland MD, 25 mg at 01/05/20 2148    cloZAPine (CLOZARIL) tablet 50 mg, 50 mg, Oral, BID, Meldon Curling, MD, 50 mg at 01/05/20 2144    EPINEPHrine PF (ADRENALIN) 1 mg/mL injection 0 15 mg, 0 15 mg, Intramuscular, Once PRN, Meldon Curling, MD    fluticasone-vilanterol (BREO ELLIPTA) 200-25 MCG/INH inhaler 1 puff, 1 puff, Inhalation, Daily, Meldon Curling, MD, 1 puff at 01/06/20 0834    ketotifen (ZADITOR) 0 025 % ophthalmic solution 1 drop, 1 drop, Right Eye, BID PRN, Meldon Curling, MD    levothyroxine tablet 125 mcg, 125 mcg, Oral, Early Morning, Meldon Curling, MD, 125 mcg at 01/06/20 0554    magnesium hydroxide (MILK OF MAGNESIA) 400 mg/5 mL oral suspension 30 mL, 30 mL, Oral, Daily PRN, Meldon Curling, MD    montelukast (SINGULAIR) tablet 10 mg, 10 mg, Oral, HS, Meldon Curling, MD, 10 mg at 12/24/19 2109    OLANZapine (ZyPREXA) IM injection 5 mg, 5 mg, Intramuscular, Q8H PRN, Meldon Curling, MD    OLANZapine (ZyPREXA) tablet 5 mg, 5 mg, Oral, Q8H PRN, Meldon Curling, MD    ondansetron (ZOFRAN-ODT) dispersible tablet 4 mg, 4 mg, Oral, Q6H PRN, Meldon Curling, MD, 4 mg at 12/09/19 1757    pantoprazole (PROTONIX) EC tablet 40 mg, 40 mg, Oral, Early Morning, Meldon Curling, MD, 40 mg at 01/06/20 0554    polyethylene glycol (MIRALAX) packet 17 g, 17 g, Oral, Daily PRN, Meldon Curling, MD    polyvinyl alcohol (LIQUIFILM TEARS) 1 4 % ophthalmic solution 1 drop, 1 drop, Both Eyes, Q3H PRN, Meldon Curling, MD    sertraline (ZOLOFT) tablet 50 mg, 50 mg, Oral, BID, Meldon Curling, MD, 50 mg at 01/06/20 2875    sucralfate (CARAFATE) oral suspension 1,000 mg, 1,000 mg, Oral, BID, Cat Torres MD, 1,000 mg at 01/06/20 0600    theophylline (JEF-24) 24 hr capsule 200 mg, 200 mg, Oral, Daily, Meldon Curling, MD, 200 mg at 01/06/20 0604    tiotropium (SPIRIVA) capsule for inhaler 18 mcg, 18 mcg, Inhalation, Daily, Meldon Curling, MD, 18 mcg at 01/06/20 5181    traZODone (DESYREL) tablet 25 mg, 25 mg, Oral, HS PRN, Meldon Curling, MD  Justification if on more than two antipsychotics:  Only on his clozapine  Side effects if any:  None    Risks , benefits, side effects and precautions of medications discussed with patient and reminded patient to let us know any problems with medications     Objective:     Vital Signs:  Vitals:    01/05/20 1600 01/05/20 2145 01/06/20 0614 01/06/20 0700   BP: 116/80 95/52  101/69   BP Location: Right arm Left arm  Right arm   Pulse: 80 68  79   Resp: 18 17  18   Temp: 97 5 °F (36 4 °C) (!) 97 °F (36 1 °C)  98 1 °F (36 7 °C)   TempSrc: Temporal Temporal     SpO2: 98% 90% 92%    Weight:       Height:         Appearance:  age appropriate, casually dressed and overweight older than stated age, appears better groomed and combed hair and pleasant and not preoccupied about her breathing or need for portable oxygen   Behavior:  evasive and guarded with no psychosomatic complaints, friendly but argumentative at times   Speech:  normal pitch and normal volume but circumstantial and tangential with stilted speech    Mood:  anxious and dysthymic   Affect:  mood-congruent, elated and entitled anxious and irritated and sad at times but pleasant today    Thought Process:  goal directed and illogical slightly pressured and tangential and talks as if in a court of law   Thought Content:  No current suicidal homicidal thoughts intent or plans reported  No phobias obsessions or compulsions reported    No overt delusions elicited today but continues to have occasional psychosomatic symptoms like having breathing difficulty and swallowing difficulty and occasionally demands to go back on portable oxygen   Perceptual Disturbances: None and does not appear responding to internal stimuli    Risk Potential: Tendency to not care for herself    Sensorium:  person, place, time/date, situation, day of week, month of year and time   Cognition:  grossly intact with no deficits in recent or remote memory or immediate recall   Consciousness:  alert and awake    Attention: Intact concentration and attention span   Intellect: Considered to be at least of average intelligence   Insight:  limited but improving   Judgment: improving      Motor Activity: no abnormal movements         Recent Labs:  Results Reviewed     None          I/O Past 24 hours:  No intake/output data recorded  No intake/output data recorded  Assessment / Plan:     Schizoaffective disorder, bipolar type ([facility] [address] )      Reason for continued inpatient care:  Because of underlying paranoia and inability to care for herself on her own and multiple somatic complaints  Acceptance by patient:  Accepting  Eric Cevallos in recovery:  About living in same personal care home at the [place] once she feels better  Understanding of medications :  Has some understanding  Involved in reintegration process: On off unit privileges now  Trusting in relatoinship with psychiatrist:  Trusting    Recommended Treatment:    Medication changes:  1) none today  Non-pharmacological treatments  1) continue with  individual therapy group therapy, milieu therapy and occupational therapy and milieu therapy involving multidisciplinary team approach with psychotherapist, case management, nursing, peer support services, art therapist etc using recovery principles and psycho-education about accepting illness and need for treatment   2) encourage to cooperate with behavior plan  3) encourage to attend to ADLs like taking showers and wearing clean clothes   4) Encourage to attend groups   5) see how she does on off unit privileges and encourage to go off unit with staff escort  and expectations discussed with her and she did verbalize an understanding, consult respiratory to see if she needs portable oxygen and their findings are still pending  Safety  1) Safety/communication plan established targeting dynamic risk factors above    Discharge Plan back to the [facility] with act services and withdraw the [facility] FAC[facility] referral due to improvement made so far  Counseling / Coordination of Care    Total floor / unit time spent today 15 minutes  Greater than 50% of total time was spent with the patient and / or family counseling and / or coordination of care  A description of the counseling / coordination of care  Patient's Rights, confidentiality and exceptions to confidentiality, use of automated medical record, [facility] staff access to medical record, and consent to treatment reviewed      Alirio Frazier MD

## 2020-01-07 NOTE — PROGRESS NOTES
01/07/20 0800   Team Meeting   Meeting Type Daily Rounds   Team Members Present   Team Members Present Nurse;; Other (Discipline and Name)   Nursing Team Member Arnaud Gutierrez RN   Care Management Team Member Brenda Hay   Other (Discipline and Name) Porfirio Javier LCSW     Patient attended [facility] only yesterday and came to the group late  Focused on brother who is caring for an ill spouse  Less somatic  Nrsg staff reports patient has been sitting at the nurses station to listen in on what is being discussed and will need to be spoken to about not doing that anymore  Slept

## 2020-01-07 NOTE — PROGRESS NOTES
01/07/20 0900   Team Meeting   Meeting Type Tx Team Meeting   Initial Conference Date 01/07/20   Next Conference Date 01/14/20   Team Members Present   Team Members Present Nurse;; Other (Discipline and Name)   Nursing Team Member Jenniffer Bustamante RN   Care Management Team Member Brenda Ruiz   Other (Discipline and Name) JER Swartz; Ashok Telles [facility] REYES   Patient/Family Present   Patient Present Yes   Patient's Family Present No     Patient attended treatment team meeting this morning without a self assessment completed  Patient attended 60% of groups offered last week  Patient is showing much improvement on the unit by completing the expectations of the unit  Patient was informed that [facility] REYES suggested CM submit referrals to both [facility] and [facility]  Patient was also asked to not sit at the nurses station where she can [place] private conversations  Patient acknowledged this and agreed to stay away  Team and patient completed risk assessment and the patient did not verbalize any desire to elope from the program  Patient verbalized understanding of consequences of eloping from treatment while on a commitment  Patient verbalized no further questions or concerns at the conclusion of the meeting

## 2020-01-07 NOTE — PLAN OF CARE
Problem: Alteration in Thoughts and Perception  Goal: Verbalize thoughts and feelings  Description  Interventions:  - Promote a nonjudgmental and trusting relationship with the patient through active listening and therapeutic communication  - Assess patient's level of functioning, behavior and potential for risk  - Engage patient in 1 on 1 interactions for a minimum of 15 minutes each session  - Encourage patient to express fears, feelings, frustrations, and discuss symptoms    - [place] patient to reality, help patient recognize reality-based thinking   - Administer medications as ordered and assess for potential side effects  - Provide the patient education related to the signs and symptoms of the illness and desired effects of prescribed medications  Outcome: Progressing  Goal: Agree to be compliant with medication regime, as prescribed and report medication side effects  Description  Interventions:  - Offer appropriate PRN medication and supervise ingestion; conduct aims, as needed   Outcome: Progressing  Goal: Attend and participate in unit activities, including therapeutic, recreational, and educational groups  Description  Interventions:  - Provide therapeutic and educational activities daily, encourage attendance and participation, and document same in the medical record     CERTIFIED PEER SPECIALIST INTERVENTIONS:    Complete peer assessment with patient to assess their needs and identify their goals to complete while in the recovery program as well as once discharged into the community  Patient will complete WRAP Plan, Crisis Plan and 5 Life Domains  Patient will attend 50% of groups offered on the unit  Patient will complete a goal card weekly      Outcome: Progressing     Problem: Alteration in Thoughts and Perception  Goal: Treatment Goal: Gain control of psychotic behaviors/thinking, reduce/eliminate presenting symptoms and demonstrate improved reality functioning upon discharge  Outcome: Not Progressing  Goal: Recognize dysfunctional thoughts, communicate reality-based thoughts at the time of discharge  Description  Interventions:  - Provide medication and psycho-education to assist patient in compliance and developing insight into his/her illness   Outcome: Not Progressing  Goal: Complete daily ADLs, including personal hygiene independently, as able  Description  Interventions:  - Observe, teach, and assist patient with ADLS  - Monitor and promote a balance of rest/activity, with adequate nutrition and elimination   Outcome: Not Progressing     Pleasant, cooperative, isolative to room and self  Med and meal compliant  Denies depression, anxiety, SI and HI tonight  No somatic complaints  Did state that ear was bothering her but Debrox as ordered is helping  Patient became irritated briefly when advised of time limit on phone due to peer waiting to use  Retreated to her room but quickly regained composure  Attended evening group and had snack    Will continue to monitor progress in recovery program

## 2020-01-07 NOTE — PROGRESS NOTES
01/03/20 1100   Activity/Group Checklist   Group Other (Comment)  (IMR - Weekly Goal Review/Life Balance)   Attendance Attended   Attendance Duration (min) 46-60   Interactions Interacted appropriately   Affect/Mood Appropriate   Goals Achieved Identified feelings; Discussed safety plan;Discussed coping strategies; Able to engage in interactions; Able to listen to others; Able to self-disclose; Able to recieve feedback; Able to reflect/comment on own behavior; Increased hopefulness     Patient attended [facility] focused on Weekly Goal Review and Life Balance  Patient reports that she attended groups and showered, but did not bring her goal card  Therapist reminded patient that she also went off the unit for a lengthy period of time with therapist, without issue  Patient participated in the discussion about Life Balance  She does need redirection at times to focus on her own recovery instead of asking about patients who have discharged  Patient participated and was respectful of peers

## 2020-01-07 NOTE — PLAN OF CARE
Problem: Alteration in Thoughts and Perception  Goal: Verbalize thoughts and feelings  Description  Interventions:  - Promote a nonjudgmental and trusting relationship with the patient through active listening and therapeutic communication  - Assess patient's level of functioning, behavior and potential for risk  - Engage patient in 1 on 1 interactions for a minimum of 15 minutes each session  - Encourage patient to express fears, feelings, frustrations, and discuss symptoms    - [place] patient to reality, help patient recognize reality-based thinking   - Administer medications as ordered and assess for potential side effects  - Provide the patient education related to the signs and symptoms of the illness and desired effects of prescribed medications  Outcome: Progressing  Goal: Agree to be compliant with medication regime, as prescribed and report medication side effects  Description  Interventions:  - Offer appropriate PRN medication and supervise ingestion; conduct aims, as needed   Outcome: Progressing  Goal: Attend and participate in unit activities, including therapeutic, recreational, and educational groups  Description  Interventions:  - Provide therapeutic and educational activities daily, encourage attendance and participation, and document same in the medical record     CERTIFIED PEER SPECIALIST INTERVENTIONS:    Complete peer assessment with patient to assess their needs and identify their goals to complete while in the recovery program as well as once discharged into the community  Patient will complete WRAP Plan, Crisis Plan and 5 Life Domains  Patient will attend 50% of groups offered on the unit  Patient will complete a goal card weekly      Outcome: Progressing     Problem: RESPIRATORY - ADULT  Goal: Achieves optimal ventilation and oxygenation  Description  INTERVENTIONS:  - Assess for changes in respiratory status  - Assess for changes in mentation and behavior  - Position to facilitate oxygenation and minimize respiratory effort  - Oxygen administration by appropriate delivery method based on oxygen saturation (per order) or ABGs  - Initiate smoking cessation education as indicated  - Encourage broncho-pulmonary hygiene including cough, deep breathe, Incentive Spirometry  - Assess the need for suctioning and aspirate as needed  - Assess and instruct to report SOB or any respiratory difficulty  - Respiratory Therapy support as indicated  Outcome: Progressing     Problem: Nutrition/Hydration-ADULT  Goal: Nutrient/Hydration intake appropriate for improving, restoring or maintaining nutritional needs  Description  Monitor and assess patient's nutrition/hydration status for malnutrition  Collaborate with interdisciplinary team and initiate plan and interventions as ordered  Monitor patient's weight and dietary intake as ordered or per policy  Utilize nutrition screening tool and intervene as necessary  Determine patient's food preferences and provide high-protein, high-caloric foods as appropriate  INTERVENTIONS:  - Monitor oral intake, urinary output, labs, and treatment plans  - Assess nutrition and hydration status and recommend course of action  - Evaluate amount of meals eaten  - Assist patient with eating if necessary   - Allow adequate time for meals  - Recommend/ encourage appropriate diets, oral nutritional supplements, and vitamin/mineral supplements  - Order, calculate, and assess calorie counts as needed  - Recommend, monitor, and adjust tube feedings and TPN/PPN based on assessed needs  - Assess need for intravenous fluids  - Provide specific nutrition/hydration education as appropriate  - Include patient/family/caregiver in decisions related to nutrition  Outcome: Progressing     [address] continues troubled by inability to reach her sister who is staying over @ brother's as he may be ill, is responsible for much older frail partner's care   Worrying something serious is going on  Given support  She hopes to hear from sister tomorrow  Otherwise is pleasant, was more visible; played cards w/staff in dining room  Socializes almost exclusively w/staff  Came up for all scheduled medicines except Singulair  Used the Tailored reaching volumes of 1250ml  Ate 100% of meal of egg salad, crackers, ice cream, Ensure  Had HS snack of Cheesits & ice cream  Did not attend PM Group as a few minutes too late to gain entry  Wearing now her QHS humidified nasal O2 @ 1L for bed

## 2020-01-07 NOTE — PLAN OF CARE
Problem: Alteration in Thoughts and Perception  Goal: Treatment Goal: Gain control of psychotic behaviors/thinking, reduce/eliminate presenting symptoms and demonstrate improved reality functioning upon discharge  Outcome: Progressing  Goal: Verbalize thoughts and feelings  Description  Interventions:  - Promote a nonjudgmental and trusting relationship with the patient through active listening and therapeutic communication  - Assess patient's level of functioning, behavior and potential for risk  - Engage patient in 1 on 1 interactions for a minimum of 15 minutes each session  - Encourage patient to express fears, feelings, frustrations, and discuss symptoms    - [place] patient to reality, help patient recognize reality-based thinking   - Administer medications as ordered and assess for potential side effects  - Provide the patient education related to the signs and symptoms of the illness and desired effects of prescribed medications  Outcome: Progressing  Goal: Agree to be compliant with medication regime, as prescribed and report medication side effects  Description  Interventions:  - Offer appropriate PRN medication and supervise ingestion; conduct aims, as needed   Outcome: Progressing  Goal: Attend and participate in unit activities, including therapeutic, recreational, and educational groups  Description  Interventions:  - Provide therapeutic and educational activities daily, encourage attendance and participation, and document same in the medical record     CERTIFIED PEER SPECIALIST INTERVENTIONS:    Complete peer assessment with patient to assess their needs and identify their goals to complete while in the recovery program as well as once discharged into the community  Patient will complete WRAP Plan, Crisis Plan and 5 Life Domains  Patient will attend 50% of groups offered on the unit  Patient will complete a goal card weekly      Outcome: Progressing  Goal: Recognize dysfunctional thoughts, communicate reality-based thoughts at the time of discharge  Description  Interventions:  - Provide medication and psycho-education to assist patient in compliance and developing insight into his/her illness   Outcome: Progressing     Problem: Ineffective Coping  Goal: Participates in unit activities  Description  Interventions:  - Provide therapeutic environment   - Provide required programming   - Redirect inappropriate behaviors   Outcome: Progressing  Goal: Patient/Family verbalizes awareness of resources  Outcome: Progressing  Goal: Understands least restrictive measures  Description  Interventions:  - Utilize least restrictive behavior  Outcome: Progressing     Problem: Risk for Self Injury/Neglect  Goal: Treatment Goal: Remain safe during length of stay, learn and adopt new coping skills, and be free of self-injurious ideation, impulses and acts at the time of discharge  Outcome: Progressing  Goal: Verbalize thoughts and feelings  Description  Interventions:  - Assess and re-assess patient's lethality and potential for self-injury  - Engage patient in 1:1 interactions, daily, for a minimum of 15 minutes  - Encourage patient to express feelings, fears, frustrations, hopes  - Establish rapport/trust with patient   Outcome: Progressing  Goal: Refrain from harming self  Description  Interventions:  - Monitor patient closely, per order  - Develop a trusting relationship  - Supervise medication ingestion, monitor effects and side effects   Outcome: Progressing  Goal: Attend and participate in unit activities, including therapeutic, recreational, and educational groups  Description  Interventions:  - Provide therapeutic and educational activities daily, encourage attendance and participation, and document same in the medical record  - Obtain collateral information, encourage visitation and family involvement in care   Outcome: Progressing     Problem: Anxiety  Goal: Anxiety is at manageable level  Description  Interventions:  - Assess and monitor patient's anxiety level  - Monitor for signs and symptoms of anxiety both physical and emotional (heart palpitations, chest pain, shortness of breath, headaches, nausea, feeling jumpy, restlessness, irritable, apprehensive)  - Collaborate with interdisciplinary team and initiate plan and interventions as ordered  - [place] patient to unit/surroundings  - Explain treatment plan  - Encourage participation in care  - Encourage verbalization of concerns/fears  - Identify coping mechanisms  - Assist in developing anxiety-reducing skills  - Administer/offer alternative therapies  - Limit or eliminate stimulants  Outcome: Progressing     Problem: Alteration in Orientation  Goal: Interact with staff daily  Description  Interventions:  - Assess and re-assess patient's level of orientation  - Engage patient in 1 on 1 interactions, daily, for a minimum of 15 minutes   - Establish rapport/trust with patient   Outcome: Progressing     Problem: PAIN - ADULT  Goal: Verbalizes/displays adequate comfort level or baseline comfort level  Description  Interventions:  - Encourage patient to monitor pain and request assistance  - Assess pain using appropriate pain scale  - Administer analgesics based on type and severity of pain and evaluate response  - Implement non-pharmacological measures as appropriate and evaluate response  - Consider cultural and social influences on pain and pain management  - Notify physician/advanced practitioner if interventions unsuccessful or patient reports new pain  Outcome: Progressing     Problem: SAFETY ADULT  Goal: Patient will remain free of falls  Description  INTERVENTIONS:  - Assess patient frequently for physical needs  -  Identify cognitive and physical deficits and behaviors that affect risk of falls    -  [place] fall precautions as indicated by assessment   - Educate patient/family on patient safety including physical limitations  - Instruct patient to call for assistance with activity based on assessment  - Modify environment to reduce risk of injury  - Consider OT/PT consult to assist with strengthening/mobility  Outcome: Progressing     Problem: Nutrition/Hydration-ADULT  Goal: Nutrient/Hydration intake appropriate for improving, restoring or maintaining nutritional needs  Description  Monitor and assess patient's nutrition/hydration status for malnutrition  Collaborate with interdisciplinary team and initiate plan and interventions as ordered  Monitor patient's weight and dietary intake as ordered or per policy  Utilize nutrition screening tool and intervene as necessary  Determine patient's food preferences and provide high-protein, high-caloric foods as appropriate  INTERVENTIONS:  - Monitor oral intake, urinary output, labs, and treatment plans  - Assess nutrition and hydration status and recommend course of action  - Evaluate amount of meals eaten  - Assist patient with eating if necessary   - Allow adequate time for meals  - Recommend/ encourage appropriate diets, oral nutritional supplements, and vitamin/mineral supplements  - Order, calculate, and assess calorie counts as needed  - Recommend, monitor, and adjust tube feedings and TPN/PPN based on assessed needs  - Assess need for intravenous fluids  - Provide specific nutrition/hydration education as appropriate  - Include patient/family/caregiver in decisions related to nutrition  Outcome: Progressing   Mostly isolative to her bed, out for meds and meals, attended IMR  No somatic complaints voiced  Ate 50% of both meals  No issues noted  Continue to monitor

## 2020-01-07 NOTE — PLAN OF CARE
Problem: DISCHARGE PLANNING  Goal: Discharge to home or other facility with appropriate resources  Description  INTERVENTIONS:  - Conduct assessment to determine patient/family and health care team treatment goals, and need for post-acute services based on payer coverage, community resources, and patient preferences, and barriers to discharge  - Address psychosocial, clinical, and financial barriers to discharge as identified in assessment in conjunction with the patient/family and health care team  - Assist the patient in reintegration back into the community by removing barriers which may hinder a successful discharge once deemed stable  - Arrange appropriate level of post-acute services according to patient's needs and preference and payer coverage in collaboration with the physician and health care team  - Communicate with and update the patient/family, physician, and health care team regarding progress on the discharge plan  - Arrange appropriate transportation to post-acute venues    Outcome: Progressing     CM faxed a housing referral to [facility] for potential placement at either [facility] or [facility], per the suggestion of Kierra Quinteros, [facility]   CM will continue to follow patient's progress and assist with discharge planning needs

## 2020-01-07 NOTE — PLAN OF CARE
Problem: RESPIRATORY - ADULT  Goal: Achieves optimal ventilation and oxygenation  Description  INTERVENTIONS:  - Assess for changes in respiratory status  - Assess for changes in mentation and behavior  - Position to facilitate oxygenation and minimize respiratory effort  - Oxygen administration by appropriate delivery method based on oxygen saturation (per order) or ABGs  - Initiate smoking cessation education as indicated  - Encourage broncho-pulmonary hygiene including cough, deep breathe, Incentive Spirometry  - Assess the need for suctioning and aspirate as needed  - Assess and instruct to report SOB or any respiratory difficulty  - Respiratory Therapy support as indicated  Outcome: Progressing     Problem: SLEEP DISTURBANCE  Goal: Will exhibit normal sleeping pattern  Description  Interventions:  -  Assess the patients sleep pattern, noting recent changes  - Administer medication as ordered  - Decrease environmental stimuli, including noise, as appropriate during the night  - Encourage the patient to actively participate in unit groups and or exercise during the day to enhance ability to achieve adequate sleep at night  - Assess the patient, in the morning, encouraging a description of sleep experience  Outcome: Progressing    Received patient in bed, 1L humidified NC  No indications of respiratory distress  Continued sleeping throughout the night  No behaviors observed

## 2020-01-08 NOTE — PROGRESS NOTES
01/07/20 1100   Activity/Group Checklist   Group Other (Comment)  (IMR - Shame and Vulnerability)   Attendance Attended   Attendance Duration (min) 46-60   Interactions Interacted appropriately   Affect/Mood Appropriate   Goals Achieved Identified feelings; Identified triggers; Displayed empathy; Increased hopefulness; Discussed coping strategies; Able to listen to others; Able to engage in interactions; Able to reflect/comment on own behavior;Able to manage/cope with feelings; Able to self-disclose; Able to recieve feedback     Patient attended group focused on shame and vulnerability  Patient participated in discussion  She did complain of feeling like "gravity" is pulling her and causing her panic  Patient's feelings were processed within the context of the group  She was tangential at times, interested in the business of others, but accepted redirection and an explanation about why this was inappropriate  Patient participated in discussion and was respectful of peers

## 2020-01-08 NOTE — PROGRESS NOTES
01/08/20 1100   Activity/Group Checklist   Group   (IMR/ Family Dynamics )   Attendance Attended   Attendance Duration (min) 46-60   Interactions Interacted appropriately   Affect/Mood Appropriate;Normal range;Bright   Goals Achieved Identified feelings; Identified triggers; Discussed coping strategies; Identified distorted thoughts/beliefs; Able to listen to others; Able to engage in interactions; Able to self-disclose

## 2020-01-08 NOTE — PROGRESS NOTES
01/08/20 0800   Team Meeting   Meeting Type Daily Rounds   Team Members Present   Team Members Present Physician;Nurse;; Other (Discipline and Name)   Physician Team Member Dr Paty Horta, RN   Care Management Team Member Brenda Mccord   Other (Discipline and Name) Kelby Herrera, LCSW; SHANIKA Chaney     Patient attended [place] and 3-11 groups  Less somatic  Slept

## 2020-01-08 NOTE — ASSESSMENT & PLAN NOTE
Psychiatry Progress Note  Patient states that she is now willing to go off the unit to the recovery anonymous meeting later today  She is now happy that she is taken off the waiting list for [facility] because of remarkable improvement as she is taking showers at least once a week and attending more groups  However she is still upset with the fact that she is not eligible to have a portable oxygen anymore while off the unit  She is no longer voicing any thoughts about her having cancer or dying from terrible illnesses  and still questions her medications sometimes   She still has a tendency to complain about swallowing difficulties and breathing difficulty and some other psychosomatic symptoms off and on  Her hygiene is better she is well groomed and well kept today     She is wanting to go back to the [facility]  and I did remind her that involves keeping up with her hygiene like taking shower twice a week and attending 50% of groups and she has agreed to comply    Appears to still harbor some paranoia about some of the staff messing with her medications off and on   Current medications:    Current Facility-Administered Medications:     acetaminophen (TYLENOL) tablet 650 mg, 650 mg, Oral, Q6H PRN, Jill Gayle MD    albuterol (PROVENTIL HFA,VENTOLIN HFA) inhaler 2 puff, 2 puff, Inhalation, Q4H PRN, Jill Gayle MD, 2 puff at 10/11/19 0424    aluminum-magnesium hydroxide-simethicone (MYLANTA) 200-200-20 mg/5 mL oral suspension 15 mL, 15 mL, Oral, Q4H PRN, Jill Gayle MD    ammonium lactate (LAC-HYDRIN) 12 % lotion 1 application, 1 application, Topical, BID PRN, Jill Gayle MD    benzonatate (TESSALON PERLES) capsule 100 mg, 100 mg, Oral, TID PRN, Jill Gayle MD    benztropine (COGENTIN) injection 1 mg, 1 mg, Intramuscular, Q8H PRN, Jill Gayle MD    carbamide peroxide (DEBROX) 6 5 % otic solution 5 drop, 5 drop, Left Ear, BID, Rona Mcintosh MD, 5 drop at 01/08/20 0901    cloZAPine (CLOZARIL) tablet 25 mg, 25 mg, Oral, BID, Jaz Beasley MD, 25 mg at 01/07/20 2107    cloZAPine (CLOZARIL) tablet 50 mg, 50 mg, Oral, BID, Jaz Beasley MD, 50 mg at 01/07/20 2108    EPINEPHrine PF (ADRENALIN) 1 mg/mL injection 0 15 mg, 0 15 mg, Intramuscular, Once PRN, Jaz Beasley MD    fluticasone-vilanterol (BREO ELLIPTA) 200-25 MCG/INH inhaler 1 puff, 1 puff, Inhalation, Daily, Jaz Beasley MD, 1 puff at 01/08/20 0849    ketotifen (ZADITOR) 0 025 % ophthalmic solution 1 drop, 1 drop, Right Eye, BID PRN, Jaz Beasley MD    levothyroxine tablet 125 mcg, 125 mcg, Oral, Early Morning, Jaz Beasley MD, 125 mcg at 01/08/20 0604    magnesium hydroxide (MILK OF MAGNESIA) 400 mg/5 mL oral suspension 30 mL, 30 mL, Oral, Daily PRN, Jaz Beasley MD    montelukast (SINGULAIR) tablet 10 mg, 10 mg, Oral, HS, Jaz Beasley MD, 10 mg at 12/24/19 2109    OLANZapine (ZyPREXA) IM injection 5 mg, 5 mg, Intramuscular, Q8H PRN, Jaz Beasley MD    OLANZapine (ZyPREXA) tablet 5 mg, 5 mg, Oral, Q8H PRN, Jaz Beasley MD    ondansetron (ZOFRAN-ODT) dispersible tablet 4 mg, 4 mg, Oral, Q6H PRN, Jaz Beasley MD, 4 mg at 12/09/19 1757    pantoprazole (PROTONIX) EC tablet 40 mg, 40 mg, Oral, Early Morning, Jaz Beasley MD, 40 mg at 01/08/20 0604    polyethylene glycol (MIRALAX) packet 17 g, 17 g, Oral, Daily PRN, Jaz Beasley MD    polyvinyl alcohol (LIQUIFILM TEARS) 1 4 % ophthalmic solution 1 drop, 1 drop, Both Eyes, Q3H PRN, Jaz Beasley MD    sertraline (ZOLOFT) tablet 50 mg, 50 mg, Oral, BID, Jaz Beasley MD, 50 mg at 01/08/20 0849    sucralfate (CARAFATE) oral suspension 1,000 mg, 1,000 mg, Oral, BID, Cat Torres MD, 1,000 mg at 01/08/20 0604    theophylline (JEF-24) 24 hr capsule 200 mg, 200 mg, Oral, Daily, Jaz Beasley MD, 200 mg at 01/08/20 0849    tiotropium (SPIRIVA) capsule for inhaler 18 mcg, 18 mcg, Inhalation, Daily, Jaz Beasley MD, 18 mcg at 01/08/20 0849    traZODone (DESYREL) tablet 25 mg, 25 mg, Oral, HS PRN, Meredith Wesley MD  Justification if on more than two antipsychotics:  Only on his clozapine  Side effects if any:  None    Risks , benefits, side effects and precautions of medications discussed with patient and reminded patient to let us know any problems with medications     Objective:     Vital Signs:  Vitals:    01/07/20 1900 01/08/20 0618 01/08/20 0735 01/08/20 0737   BP: 97/54  113/62 106/54   BP Location: Left arm  Right arm Right arm   Pulse: 70  82 78   Resp: 16  18    Temp: (!) 97 3 °F (36 3 °C)  97 6 °F (36 4 °C)    TempSrc: Temporal  Temporal    SpO2: 90% 96%     Weight:       Height:         Appearance:  age appropriate, casually dressed and overweight older than stated age, appears better groomed and combed hair and pleasant and not preoccupied about her breathing or need for portable oxygen and found laying in bed   Behavior:  evasive and guarded with no psychosomatic complaints, friendly but argumentative at times   Speech:  normal pitch and normal volume but circumstantial and tangential with stilted speech    Mood:  anxious and dysthymic   Affect:  mood-congruent, elated and entitled anxious and irritated and sad at times but pleasant today    Thought Process:  goal directed and illogical slightly pressured and tangential and talks as if in a court of law   Thought Content:  No current suicidal homicidal thoughts intent or plans reported  No phobias obsessions or compulsions reported  No overt delusions elicited today    However she still has occasional psychosomatic symptoms like having breathing difficulty and swallowing difficulty and upset with the fact that she is no longer eligible for portable oxygen   Perceptual Disturbances: None and does not appear responding to internal stimuli    Risk Potential: Tendency to not care for herself    Sensorium:  person, place, time/date, situation, day of week, month of year and time   Cognition:  grossly intact with no deficits in recent or remote memory or immediate recall   Consciousness:  alert and awake    Attention: Intact concentration and attention span   Intellect: Considered to be at least of average intelligence   Insight:  limited but improving   Judgment: improving      Motor Activity: no abnormal movements         Recent Labs:  Results Reviewed     None          I/O Past 24 hours:  No intake/output data recorded  No intake/output data recorded  Assessment / Plan:     Schizoaffective disorder, bipolar type ([facility] [address] )      Reason for continued inpatient care:  Because of underlying paranoia and inability to care for herself on her own and multiple somatic complaints  Acceptance by patient:  Accepting  Robert Yadav in recovery:  About living in same personal care home at the [place] once she feels better  Understanding of medications :  Has some understanding  Involved in reintegration process: On off unit privileges now  Trusting in relatoinship with psychiatrist:  Trusting    Recommended Treatment:    Medication changes:  1) none today  Non-pharmacological treatments  1) continue with  individual therapy group therapy, milieu therapy and occupational therapy and milieu therapy involving multidisciplinary team approach with psychotherapist, case management, nursing, peer support services, art therapist etc using recovery principles and psycho-education about accepting illness and need for treatment   2) encourage to cooperate with behavior plan  3) encourage to attend to ADLs like taking showers and wearing clean clothes   4) Encourage to attend groups   5) see how she does on off unit privileges and encourage to go off unit with staff escort  and expectations discussed with her and she did verbalize an understanding, consult respiratory to see if she needs portable oxygen and their findings are still pending  Safety  1) Safety/communication plan established targeting dynamic risk factors above    [facility]charge [facility] and withdraw the [facility] referral due to improvement made so far  Counseling / Coordination of Care    Total floor / unit time spent today 15 minutes  Greater than 50% of total time was spent with the patient and / or family counseling and / or coordination of care  A description of the counseling / coordination of care  Patient's Rights, confidentiality and exceptions to confidentiality, use of automated medical record, [facility] staff access to medical record, and consent to treatment reviewed      Ivonne Nevarez MD

## 2020-01-08 NOTE — PROGRESS NOTES
Progress Note - Sen Garvey 1955, 60 y o  female MRN: [id number]    Unit/Bed#: [facility] 110-02 Encounter: [id number]    Primary Care Provider: Paulette Delgado PA-C   Date and time admitted to hospital: 7/23/2019  5:30 PM        * Schizoaffective disorder, bipolar type [facility])  Assessment & Plan  Psychiatry Progress Note  Patient states that she is now willing to go off the unit to the recovery anonymous meeting later today  She is now happy that she is taken off the waiting list for [facility] because of remarkable improvement as she is taking showers at least once a week and attending more groups  However she is still upset with the fact that she is not eligible to have a portable oxygen anymore while off the unit  She is no longer voicing any thoughts about her having cancer or dying from terrible illnesses  and still questions her medications sometimes   She still has a tendency to complain about swallowing difficulties and breathing difficulty and some other psychosomatic symptoms off and on  Her hygiene is better she is well groomed and well kept today     She is wanting to go back to the [facility]  and I did remind her that involves keeping up with her hygiene like taking shower twice a week and attending 50% of groups and she has agreed to comply    Appears to still harbor some paranoia about some of the staff messing with her medications off and on   Current medications:    Current Facility-Administered Medications:     acetaminophen (TYLENOL) tablet 650 mg, 650 mg, Oral, Q6H PRN, Tree Madden MD    albuterol (PROVENTIL HFA,VENTOLIN HFA) inhaler 2 puff, 2 puff, Inhalation, Q4H PRN, Tree Madden MD, 2 puff at 10/11/19 0424    aluminum-magnesium hydroxide-simethicone (MYLANTA) 200-200-20 mg/5 mL oral suspension 15 mL, 15 mL, Oral, Q4H PRN, Tree Madden MD    ammonium lactate (LAC-HYDRIN) 12 % lotion 1 application, 1 application, Topical, BID PRN, Tree Madden MD  [facility] benzonatate (TESSALON PERLES) capsule 100 mg, 100 mg, Oral, TID PRN, Prakash Brewster MD    benztropine (COGENTIN) injection 1 mg, 1 mg, Intramuscular, Q8H PRN, Prakash Brewster MD    carbamide peroxide (DEBROX) 6 5 % otic solution 5 drop, 5 drop, Left Ear, BID, Rona Phillips MD, 5 drop at 01/08/20 0901    cloZAPine (CLOZARIL) tablet 25 mg, 25 mg, Oral, BID, Prakash Brewster MD, 25 mg at 01/07/20 2107    cloZAPine (CLOZARIL) tablet 50 mg, 50 mg, Oral, BID, Prakash Brewster MD, 50 mg at 01/07/20 2108    EPINEPHrine PF (ADRENALIN) 1 mg/mL injection 0 15 mg, 0 15 mg, Intramuscular, Once PRN, Prakash Brewster MD    fluticasone-vilanterol (BREO ELLIPTA) 200-25 MCG/INH inhaler 1 puff, 1 puff, Inhalation, Daily, Prakash Brewster MD, 1 puff at 01/08/20 0849    ketotifen (ZADITOR) 0 025 % ophthalmic solution 1 drop, 1 drop, Right Eye, BID PRN, Prakash Brewster MD    levothyroxine tablet 125 mcg, 125 mcg, Oral, Early Morning, Prakash Brewster MD, 125 mcg at 01/08/20 0604    magnesium hydroxide (MILK OF MAGNESIA) 400 mg/5 mL oral suspension 30 mL, 30 mL, Oral, Daily PRN, Prakash Brewster MD    montelukast (SINGULAIR) tablet 10 mg, 10 mg, Oral, HS, Prakash Brewster MD, 10 mg at 12/24/19 2109    OLANZapine (ZyPREXA) IM injection 5 mg, 5 mg, Intramuscular, Q8H PRN, Prakash Brewster MD    OLANZapine (ZyPREXA) tablet 5 mg, 5 mg, Oral, Q8H PRN, Prakash Brewster MD    ondansetron (ZOFRAN-ODT) dispersible tablet 4 mg, 4 mg, Oral, Q6H PRN, Prakash Brewster MD, 4 mg at 12/09/19 1757    pantoprazole (PROTONIX) EC tablet 40 mg, 40 mg, Oral, Early Morning, Prakash Brewster MD, 40 mg at 01/08/20 0604    polyethylene glycol (MIRALAX) packet 17 g, 17 g, Oral, Daily PRN, Prakash Brewster MD    polyvinyl alcohol (LIQUIFILM TEARS) 1 4 % ophthalmic solution 1 drop, 1 drop, Both Eyes, Q3H PRN, Prakash Brewster MD    sertraline (ZOLOFT) tablet 50 mg, 50 mg, Oral, BID, Prakash Brewster MD, 50 mg at 01/08/20 0849    sucralfate (CARAFATE) oral suspension 1,000 mg, 1,000 mg, Oral, BID, Jordana Rather MD Melissa, 1,000 mg at 01/08/20 0604    theophylline (JEF-24) 24 hr capsule 200 mg, 200 mg, Oral, Daily, Jeet Levine MD, 200 mg at 01/08/20 0849    tiotropium (SPIRIVA) capsule for inhaler 18 mcg, 18 mcg, Inhalation, Daily, Jeet Levine MD, 18 mcg at 01/08/20 0849    traZODone (DESYREL) tablet 25 mg, 25 mg, Oral, HS PRN, Jeet Levine MD  Justification if on more than two antipsychotics:  Only on his clozapine  Side effects if any:  None    Risks , benefits, side effects and precautions of medications discussed with patient and reminded patient to let us know any problems with medications     Objective:     Vital Signs:  Vitals:    01/07/20 1900 01/08/20 0618 01/08/20 0735 01/08/20 0737   BP: 97/54  113/62 106/54   BP Location: Left arm  Right arm Right arm   Pulse: 70  82 78   Resp: 16  18    Temp: (!) 97 3 °F (36 3 °C)  97 6 °F (36 4 °C)    TempSrc: Temporal  Temporal    SpO2: 90% 96%     Weight:       Height:         Appearance:  age appropriate, casually dressed and overweight older than stated age, appears better groomed and combed hair and pleasant and not preoccupied about her breathing or need for portable oxygen and found laying in bed   Behavior:  evasive and guarded with no psychosomatic complaints, friendly but argumentative at times   Speech:  normal pitch and normal volume but circumstantial and tangential with stilted speech    Mood:  anxious and dysthymic   Affect:  mood-congruent, elated and entitled anxious and irritated and sad at times but pleasant today    Thought Process:  goal directed and illogical slightly pressured and tangential and talks as if in a court of law   Thought Content:  No current suicidal homicidal thoughts intent or plans reported  No phobias obsessions or compulsions reported  No overt delusions elicited today    However she still has occasional psychosomatic symptoms like having breathing difficulty and swallowing difficulty and upset with the fact that she is no longer eligible for portable oxygen   Perceptual Disturbances: None and does not appear responding to internal stimuli    Risk Potential: Tendency to not care for herself    Sensorium:  person, place, time/date, situation, day of week, month of year and time   Cognition:  grossly intact with no deficits in recent or remote memory or immediate recall   Consciousness:  alert and awake    Attention: Intact concentration and attention span   Intellect: Considered to be at least of average intelligence   Insight:  limited but improving   Judgment: improving      Motor Activity: no abnormal movements         Recent Labs:  Results Reviewed     None          I/O Past 24 hours:  No intake/output data recorded  No intake/output data recorded  Assessment / Plan:     Schizoaffective disorder, bipolar type ([facility] [address] )      Reason for continued inpatient care:  Because of underlying paranoia and inability to care for herself on her own and multiple somatic complaints  Acceptance by patient:  Accepting  Pauletta Dose in recovery:  About living in same personal care home at the [place] once she feels better  Understanding of medications :  Has some understanding  Involved in reintegration process: On off unit privileges now  Trusting in relatoinship with psychiatrist:  Trusting    Recommended Treatment:    Medication changes:  1) none today  Non-pharmacological treatments  1) continue with  individual therapy group therapy, milieu therapy and occupational therapy and milieu therapy involving multidisciplinary team approach with psychotherapist, case management, nursing, peer support services, art therapist etc using recovery principles and psycho-education about accepting illness and need for treatment     2) encourage to cooperate with behavior plan  3) encourage to attend to ADLs like taking showers and wearing clean clothes   4) Encourage to attend groups   5) see how she does on off unit privileges and encourage to go off unit with staff escort  and expectations discussed with her and she did verbalize an understanding, consult respiratory to see if she needs portable oxygen and their findings are still pending  Safety  1) Safety/communication plan established targeting dynamic risk factors above  Discharge Plan back to the [facility] with act services and withdraw the [facility] referral due to improvement made so far  Counseling / Coordination of Care    Total floor / unit time spent today 15 minutes  Greater than 50% of total time was spent with the patient and / or family counseling and / or coordination of care  A description of the counseling / coordination of care  Patient's Rights, confidentiality and exceptions to confidentiality, use of automated medical record, [facility] staff access to medical record, and consent to treatment reviewed      Allie Guzman MD

## 2020-01-08 NOTE — PLAN OF CARE
Problem: Alteration in Thoughts and Perception  Goal: Verbalize thoughts and feelings  Description  Interventions:  - Promote a nonjudgmental and trusting relationship with the patient through active listening and therapeutic communication  - Assess patient's level of functioning, behavior and potential for risk  - Engage patient in 1 on 1 interactions for a minimum of 15 minutes each session  - Encourage patient to express fears, feelings, frustrations, and discuss symptoms    - [place] patient to reality, help patient recognize reality-based thinking   - Administer medications as ordered and assess for potential side effects  - Provide the patient education related to the signs and symptoms of the illness and desired effects of prescribed medications  Outcome: Progressing  Goal: Agree to be compliant with medication regime, as prescribed and report medication side effects  Description  Interventions:  - Offer appropriate PRN medication and supervise ingestion; conduct aims, as needed   Outcome: Progressing  Goal: Attend and participate in unit activities, including therapeutic, recreational, and educational groups  Description  Interventions:  - Provide therapeutic and educational activities daily, encourage attendance and participation, and document same in the medical record     CERTIFIED PEER SPECIALIST INTERVENTIONS:    Complete peer assessment with patient to assess their needs and identify their goals to complete while in the recovery program as well as once discharged into the community  Patient will complete WRAP Plan, Crisis Plan and 5 Life Domains  Patient will attend 50% of groups offered on the unit  Patient will complete a goal card weekly      Outcome: Progressing     Problem: Ineffective Coping  Goal: Participates in unit activities  Description  Interventions:  - Provide therapeutic environment   - Provide required programming   - Redirect inappropriate behaviors   Outcome: Progressing Problem: Risk for Self Injury/Neglect  Goal: Treatment Goal: Remain safe during length of stay, learn and adopt new coping skills, and be free of self-injurious ideation, impulses and acts at the time of discharge  Outcome: Progressing  Goal: Verbalize thoughts and feelings  Description  Interventions:  - Assess and re-assess patient's lethality and potential for self-injury  - Engage patient in 1:1 interactions, daily, for a minimum of 15 minutes  - Encourage patient to express feelings, fears, frustrations, hopes  - Establish rapport/trust with patient   Outcome: Progressing  Goal: Refrain from harming self  Description  Interventions:  - Monitor patient closely, per order  - Develop a trusting relationship  - Supervise medication ingestion, monitor effects and side effects   Outcome: Progressing  Goal: Attend and participate in unit activities, including therapeutic, recreational, and educational groups  Description  Interventions:  - Provide therapeutic and educational activities daily, encourage attendance and participation, and document same in the medical record  - Obtain collateral information, encourage visitation and family involvement in care   Outcome: Progressing     Problem: Anxiety  Goal: Anxiety is at manageable level  Description  Interventions:  - Assess and monitor patient's anxiety level  - Monitor for signs and symptoms of anxiety both physical and emotional (heart palpitations, chest pain, shortness of breath, headaches, nausea, feeling jumpy, restlessness, irritable, apprehensive)  - Collaborate with interdisciplinary team and initiate plan and interventions as ordered    - [place] patient to unit/surroundings  - Explain treatment plan  - Encourage participation in care  - Encourage verbalization of concerns/fears  - Identify coping mechanisms  - Assist in developing anxiety-reducing skills  - Administer/offer alternative therapies  - Limit or eliminate stimulants  Outcome: Progressing Problem: Alteration in Orientation  Goal: Interact with staff daily  Description  Interventions:  - Assess and re-assess patient's level of orientation  - Engage patient in 1 on 1 interactions, daily, for a minimum of 15 minutes   - Establish rapport/trust with patient   Outcome: Progressing     Problem: PAIN - ADULT  Goal: Verbalizes/displays adequate comfort level or baseline comfort level  Description  Interventions:  - Encourage patient to monitor pain and request assistance  - Assess pain using appropriate pain scale  - Administer analgesics based on type and severity of pain and evaluate response  - Implement non-pharmacological measures as appropriate and evaluate response  - Consider cultural and social influences on pain and pain management  - Notify physician/advanced practitioner if interventions unsuccessful or patient reports new pain  Outcome: Progressing     Problem: SAFETY ADULT  Goal: Patient will remain free of falls  Description  INTERVENTIONS:  - Assess patient frequently for physical needs  -  Identify cognitive and physical deficits and behaviors that affect risk of falls  -  [place] fall precautions as indicated by assessment   - Educate patient/family on patient safety including physical limitations  - Instruct patient to call for assistance with activity based on assessment  - Modify environment to reduce risk of injury  - Consider OT/PT consult to assist with strengthening/mobility  Outcome: Progressing     Problem: Nutrition/Hydration-ADULT  Goal: Nutrient/Hydration intake appropriate for improving, restoring or maintaining nutritional needs  Description  Monitor and assess patient's nutrition/hydration status for malnutrition  Collaborate with interdisciplinary team and initiate plan and interventions as ordered  Monitor patient's weight and dietary intake as ordered or per policy  Utilize nutrition screening tool and intervene as necessary   Determine patient's food preferences and provide high-protein, high-caloric foods as appropriate  INTERVENTIONS:  - Monitor oral intake, urinary output, labs, and treatment plans  - Assess nutrition and hydration status and recommend course of action  - Evaluate amount of meals eaten  - Assist patient with eating if necessary   - Allow adequate time for meals  - Recommend/ encourage appropriate diets, oral nutritional supplements, and vitamin/mineral supplements  - Order, calculate, and assess calorie counts as needed  - Recommend, monitor, and adjust tube feedings and TPN/PPN based on assessed needs  - Assess need for intravenous fluids  - Provide specific nutrition/hydration education as appropriate  - Include patient/family/caregiver in decisions related to nutrition  Outcome: Progressing   Mostly isolative to her bed, out for meds and meals, attended IMR  No somatic complaints voiced  Ate 75% of breakfast consisting of oatmeal, eggs, coffee, and yogurt  Ate 25% of lunch consisting of french fries and rice pudding  No issues noted  Continue to monitor

## 2020-01-08 NOTE — PROGRESS NOTES
Progress Note - Behavioral Health   Chad Meyer 60 y o  female MRN: [id number]  Unit/Bed#: [facility] 110-02 Encounter: [id number]    Reyes Zendejas was in good spirits this evening, stating that she is experiencing improved physical and emotional health  She reports improvement in her appetite and states she is tolerating her diet without issue and enjoys her Ensure supplements  She has been going to groups more frequently and proudly reports she is getting off campus privileges  She has been sleeping "better " She is less depressed and less anxious  Markedly less somatic preoccupations  She denies any homicidal or suicidal ideations  Current Mental Status Evaluation:  Appearance:  Good eye contact and disheveled   Behavior:  calm, cooperative and friendly   Mood:  euthymic   Affect: appropriate   Speech: Normal rate and Normal volume   Thought Process:  Goal directed and coherent   Thought Content:  Does not verbalize delusional material   Perceptual Disturbances: Denies hallucinations and does not appear to be responding to internal stimuli   Risk Potential: No suicidal or homicidal ideation   Orientation:   Oriented x3     Progress Toward Goals:  No significant change in the last 24 hours  Principal Problem:    Schizoaffective disorder, bipolar type ([facility] [address] )  Active Problems:    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[facility] [address] )    Left hip pain    Acquired hypothyroidism    Gastroesophageal reflux disease without esophagitis    At risk for aspiration    Ear ache      Recommended Treatment: Continue with pharmacotherapy, group therapy, milieu therapy and occupational therapy    The patient will be maintained on the following medications:    Current Facility-Administered Medications:  acetaminophen 650 mg Oral Q6H PRN Mynor Terry MD   albuterol 2 puff Inhalation Q4H PRN Mynor Terry MD   aluminum-magnesium hydroxide-simethicone 15 mL Oral Q4H PRN Mynor Terry MD   ammonium lactate 1 application Topical BID PRN Mynor Terry MD   benzonatate 100 mg Oral TID PRN Allie Guzman MD   benztropine 1 mg Intramuscular Q8H PRN Allie Guzman MD   carbamide peroxide 5 drop Left Ear BID Rona Elias MD   cloZAPine 25 mg Oral BID Allie Guzman MD   cloZAPine 50 mg Oral BID Allie Guzman MD   EPINEPHrine PF 0 15 mg Intramuscular Once PRN Allie Guzman MD   fluticasone-vilanterol 1 puff Inhalation Daily Allie Guzman MD   ketotifen 1 drop Right Eye BID PRN Allie Guzman MD   levothyroxine 125 mcg Oral Early Morning Allie Guzman MD   magnesium hydroxide 30 mL Oral Daily PRN Allie Guzman MD   montelukast 10 mg Oral HS Allie Guzman MD   OLANZapine 5 mg Intramuscular Q8H PRN Allie Guzman MD   OLANZapine 5 mg Oral Q8H PRN Allie Guzman MD   ondansetron 4 mg Oral Q6H PRN Allie Guzman MD   pantoprazole 40 mg Oral Early Morning Allie Guzman MD   polyethylene glycol 17 g Oral Daily PRN Allie Guzman MD   polyvinyl alcohol 1 drop Both Eyes Q3H PRN Allie Guzman MD   sertraline 50 mg Oral BID Allie Guzman MD   sucralfate 1,000 mg Oral BID Sadie Holland MD   theophylline 200 mg Oral Daily Allie Guzman MD   tiotropium 18 mcg Inhalation Daily Allie Guzman MD   traZODone 25 mg Oral HS PRN Allie Guzman MD

## 2020-01-08 NOTE — PROGRESS NOTES
01/08/20 1400   Activity/Group Checklist   Group   ([facility] )   Attendance Did not attend   Attendance Duration (min) 46-60   Affect/Mood IRMA

## 2020-01-09 NOTE — PLAN OF CARE
Problem: Alteration in Thoughts and Perception  Goal: Verbalize thoughts and feelings  Description  Interventions:  - Promote a nonjudgmental and trusting relationship with the patient through active listening and therapeutic communication  - Assess patient's level of functioning, behavior and potential for risk  - Engage patient in 1 on 1 interactions for a minimum of 15 minutes each session  - Encourage patient to express fears, feelings, frustrations, and discuss symptoms    - [place] patient to reality, help patient recognize reality-based thinking   - Administer medications as ordered and assess for potential side effects  - Provide the patient education related to the signs and symptoms of the illness and desired effects of prescribed medications  Outcome: Progressing  Goal: Agree to be compliant with medication regime, as prescribed and report medication side effects  Description  Interventions:  - Offer appropriate PRN medication and supervise ingestion; conduct aims, as needed   Outcome: Progressing  Goal: Attend and participate in unit activities, including therapeutic, recreational, and educational groups  Description  Interventions:  - Provide therapeutic and educational activities daily, encourage attendance and participation, and document same in the medical record     CERTIFIED PEER SPECIALIST INTERVENTIONS:    Complete peer assessment with patient to assess their needs and identify their goals to complete while in the recovery program as well as once discharged into the community  Patient will complete WRAP Plan, Crisis Plan and 5 Life Domains  Patient will attend 50% of groups offered on the unit  Patient will complete a goal card weekly      Outcome: Progressing     Problem: Ineffective Coping  Goal: Participates in unit activities  Description  Interventions:  - Provide therapeutic environment   - Provide required programming   - Redirect inappropriate behaviors   Outcome: Progressing  Goal: Patient/Family verbalizes awareness of resources  Outcome: Progressing  Goal: Understands least restrictive measures  Description  Interventions:  - Utilize least restrictive behavior  Outcome: Progressing     Problem: Risk for Self Injury/Neglect  Goal: Treatment Goal: Remain safe during length of stay, learn and adopt new coping skills, and be free of self-injurious ideation, impulses and acts at the time of discharge  Outcome: Progressing  Goal: Verbalize thoughts and feelings  Description  Interventions:  - Assess and re-assess patient's lethality and potential for self-injury  - Engage patient in 1:1 interactions, daily, for a minimum of 15 minutes  - Encourage patient to express feelings, fears, frustrations, hopes  - Establish rapport/trust with patient   Outcome: Progressing  Goal: Refrain from harming self  Description  Interventions:  - Monitor patient closely, per order  - Develop a trusting relationship  - Supervise medication ingestion, monitor effects and side effects   Outcome: Progressing  Goal: Attend and participate in unit activities, including therapeutic, recreational, and educational groups  Description  Interventions:  - Provide therapeutic and educational activities daily, encourage attendance and participation, and document same in the medical record  - Obtain collateral information, encourage visitation and family involvement in care   Outcome: Progressing     Problem: Anxiety  Goal: Anxiety is at manageable level  Description  Interventions:  - Assess and monitor patient's anxiety level  - Monitor for signs and symptoms of anxiety both physical and emotional (heart palpitations, chest pain, shortness of breath, headaches, nausea, feeling jumpy, restlessness, irritable, apprehensive)  - Collaborate with interdisciplinary team and initiate plan and interventions as ordered    - [place] patient to unit/surroundings  - Explain treatment plan  - Encourage participation in care  - Encourage verbalization of concerns/fears  - Identify coping mechanisms  - Assist in developing anxiety-reducing skills  - Administer/offer alternative therapies  - Limit or eliminate stimulants  Outcome: Progressing     Problem: Alteration in Orientation  Goal: Interact with staff daily  Description  Interventions:  - Assess and re-assess patient's level of orientation  - Engage patient in 1 on 1 interactions, daily, for a minimum of 15 minutes   - Establish rapport/trust with patient   Outcome: Progressing     Problem: PAIN - ADULT  Goal: Verbalizes/displays adequate comfort level or baseline comfort level  Description  Interventions:  - Encourage patient to monitor pain and request assistance  - Assess pain using appropriate pain scale  - Administer analgesics based on type and severity of pain and evaluate response  - Implement non-pharmacological measures as appropriate and evaluate response  - Consider cultural and social influences on pain and pain management  - Notify physician/advanced practitioner if interventions unsuccessful or patient reports new pain  Outcome: Progressing     Problem: SAFETY ADULT  Goal: Patient will remain free of falls  Description  INTERVENTIONS:  - Assess patient frequently for physical needs  -  Identify cognitive and physical deficits and behaviors that affect risk of falls  -  [place] fall precautions as indicated by assessment   - Educate patient/family on patient safety including physical limitations  - Instruct patient to call for assistance with activity based on assessment  - Modify environment to reduce risk of injury  - Consider OT/PT consult to assist with strengthening/mobility  Outcome: Progressing     Problem: Nutrition/Hydration-ADULT  Goal: Nutrient/Hydration intake appropriate for improving, restoring or maintaining nutritional needs  Description  Monitor and assess patient's nutrition/hydration status for malnutrition   Collaborate with interdisciplinary team and initiate plan and interventions as ordered  Monitor patient's weight and dietary intake as ordered or per policy  Utilize nutrition screening tool and intervene as necessary  Determine patient's food preferences and provide high-protein, high-caloric foods as appropriate  INTERVENTIONS:  - Monitor oral intake, urinary output, labs, and treatment plans  - Assess nutrition and hydration status and recommend course of action  - Evaluate amount of meals eaten  - Assist patient with eating if necessary   - Allow adequate time for meals  - Recommend/ encourage appropriate diets, oral nutritional supplements, and vitamin/mineral supplements  - Order, calculate, and assess calorie counts as needed  - Recommend, monitor, and adjust tube feedings and TPN/PPN based on assessed needs  - Assess need for intravenous fluids  - Provide specific nutrition/hydration education as appropriate  - Include patient/family/caregiver in decisions related to nutrition  Outcome: Progressing   Mostly isolative to her bed, out for meds and meals, attended IMR  No somatic complaints voiced  Ate 75% of breakfast consisting of oatmeal and eggs  Ate 25% of lunch consisting of some french fries and 1/2 of her pasta salad  No issues noted  Continue to monitor

## 2020-01-09 NOTE — PROGRESS NOTES
01/09/20 0900   Team Meeting   Meeting Type Daily Rounds   Team Members Present   Team Members Present Physician;Nurse;; Other (Discipline and Name)   Physician Team Member Dr Birgit Castro Team Member Desmond Dawn RN   Care Management Team Member Brenda Green   Other (Discipline and Name) Jasvir Hopkins LCSW     Patient is doing better with following programming   Good appetite  Slept

## 2020-01-09 NOTE — PROGRESS NOTES
Progress Note - Violetta Niles 1955, 60 y o  female MRN: [id number]    [facility] 110-02 Encounter: [id number]    Primary Care Provider: Praveen Barrios PA-C   Date and time admitted to hospital: 7/23/2019  5:30 PM        * Schizoaffective disorder, bipolar type [facility])  Assessment & Plan  Psychiatry Progress Note  Patient did not attend the off unit activity yesterday called recovery anonymous even though she had told me that she would try to  Staff has reported that it was because of her not taking any showers in last several days  She is now happy that she is taken off the waiting list for [facility] because of remarkable improvement as she is taking showers at least once a week and attending more groups and is hoping to be accepted back at the [facility] personal care [place]  However she is frustrated and upset with the fact that she is not eligible to have a portable oxygen anymore as determined by respiratory  She is not reporting any thoughts about having cancer or dying from terrible illnesses but does question her medications sometimes and accuses some of the staff of tampering with her spirometer and inhaler  She still has a tendency to complain about swallowing difficulties and breathing difficulty and some other psychosomatic symptoms off and on and has not been eating meals all the time  Her hygiene is better she is well groomed and well kept today  I did remind her that she needs to keep up with her hygiene like taking showers twice a week and attending 50% of groups to be able to return back to the [facility]       Current medications:    Current Facility-Administered Medications:     acetaminophen (TYLENOL) tablet 650 mg, 650 mg, Oral, Q6H PRN, Felisa Florence MD    albuterol (PROVENTIL HFA,VENTOLIN HFA) inhaler 2 puff, 2 puff, Inhalation, Q4H PRN, Felisa Florence MD, 2 puff at 10/11/19 0424    aluminum-magnesium hydroxide-simethicone (MYLANTA) 200-200-20 mg/5 mL oral suspension 15 mL, 15 mL, Oral, Q4H PRN, Chichi Lockett MD    ammonium lactate (LAC-HYDRIN) 12 % lotion 1 application, 1 application, Topical, BID PRN, Chichi Lockett MD    benzonatate (TESSALON PERLES) capsule 100 mg, 100 mg, Oral, TID PRN, Chichi Lockett MD    benztropine (COGENTIN) injection 1 mg, 1 mg, Intramuscular, Q8H PRN, Chichi Lockett MD    carbamide peroxide (DEBROX) 6 5 % otic solution 5 drop, 5 drop, Left Ear, BID, Rona Santamaria MD, 5 drop at 01/08/20 2142    cloZAPine (CLOZARIL) tablet 25 mg, 25 mg, Oral, BID, Chichi Lockett MD, 25 mg at 01/08/20 2142    cloZAPine (CLOZARIL) tablet 50 mg, 50 mg, Oral, BID, Chichi Lockett MD, 50 mg at 01/08/20 2142    EPINEPHrine PF (ADRENALIN) 1 mg/mL injection 0 15 mg, 0 15 mg, Intramuscular, Once PRN, Chichi Lockett MD    fluticasone-vilanterol (BREO ELLIPTA) 200-25 MCG/INH inhaler 1 puff, 1 puff, Inhalation, Daily, Chichi Lockett MD, 1 puff at 01/08/20 0849    ketotifen (ZADITOR) 0 025 % ophthalmic solution 1 drop, 1 drop, Right Eye, BID PRN, Chichi Lockett MD    levothyroxine tablet 125 mcg, 125 mcg, Oral, Early Morning, Chichi Lockett MD, 125 mcg at 01/09/20 0603    magnesium hydroxide (MILK OF MAGNESIA) 400 mg/5 mL oral suspension 30 mL, 30 mL, Oral, Daily PRN, Chichi Lockett MD    montelukast (SINGULAIR) tablet 10 mg, 10 mg, Oral, HS, Chichi Lockett MD, 10 mg at 12/24/19 2109    OLANZapine (ZyPREXA) IM injection 5 mg, 5 mg, Intramuscular, Q8H PRN, Chichi Lockett MD    OLANZapine (ZyPREXA) tablet 5 mg, 5 mg, Oral, Q8H PRN, Chichi Lockett MD    ondansetron (ZOFRAN-ODT) dispersible tablet 4 mg, 4 mg, Oral, Q6H PRN, Chichi Lockett MD, 4 mg at 12/09/19 1757    pantoprazole (PROTONIX) EC tablet 40 mg, 40 mg, Oral, Early Morning, Chichi Lockett MD, 40 mg at 01/09/20 0603    polyethylene glycol (MIRALAX) packet 17 g, 17 g, Oral, Daily PRN, Chichi Lockett MD    polyvinyl alcohol (LIQUIFILM TEARS) 1 4 % ophthalmic solution 1 drop, 1 drop, Both Eyes, Q3H PRN, MD Alia Glover sertraline (ZOLOFT) tablet 50 mg, 50 mg, Oral, BID, Shilpa Trujillo MD, 50 mg at 01/08/20 2142    sucralfate (CARAFATE) oral suspension 1,000 mg, 1,000 mg, Oral, BID, Angelica Nageotte, MD, 1,000 mg at 01/09/20 0603    theophylline (JEF-24) 24 hr capsule 200 mg, 200 mg, Oral, Daily, Shilpa Trujillo MD, 200 mg at 01/08/20 0849    tiotropium (SPIRIVA) capsule for inhaler 18 mcg, 18 mcg, Inhalation, Daily, Shilpa Trujillo MD, 18 mcg at 01/08/20 0849    traZODone (DESYREL) tablet 25 mg, 25 mg, Oral, HS PRN, Shilpa Trujillo MD  Justification if on more than two antipsychotics:  Only on his clozapine  Side effects if any:  None    Risks , benefits, side effects and precautions of medications discussed with patient and reminded patient to let us know any problems with medications     Objective:     Vital Signs:  Vitals:    01/08/20 0737 01/08/20 1631 01/08/20 2045 01/09/20 0638   BP: 106/54 120/84 108/55    BP Location: Right arm Right arm Left arm    Pulse: 78 84 87    Resp:  16 16    Temp:  98 °F (36 7 °C) (!) 97 4 °F (36 3 °C)    TempSrc:  Temporal Temporal    SpO2:  92% 91% 95%   Weight:       Height:         Appearance:  age appropriate, casually dressed and overweight older than stated age, appears better groomed and combed hair and pleasant and not preoccupied about her breathing or need for portable oxygen and found sitting in the dining arrieta   Behavior:  evasive and guarded with no psychosomatic complaints, friendly but argumentative at times but less often   Speech:  normal pitch and normal volume but circumstantial and tangential with stilted speech    Mood:  anxious and dysthymic   Affect:  mood-congruent, elated and entitled anxious and irritated and sad at times but pleasant today    Thought Process:  goal directed and illogical slightly pressured and tangential and talks as if in a court of law   Thought Content:  No current suicidal homicidal thoughts intent or plans reported    No phobias obsessions or compulsions reported  No overt delusions elicited today  However she still has occasional psychosomatic symptoms like having breathing difficulty and swallowing difficulty and still upset with the fact that she is no longer eligible for portable oxygen and states she will try to go off the unit again with staff   Perceptual Disturbances: None and does not appear responding to internal stimuli    Risk Potential: Tendency to not care for herself    Sensorium:  person, place, time/date, situation, day of week, month of year and time   Cognition:  grossly intact with no deficits in recent or remote memory or immediate recall   Consciousness:  alert and awake    Attention: Intact concentration and attention span   Intellect: Considered to be at least of average intelligence   Insight:  limited but improving   Judgment: improving      Motor Activity: no abnormal movements         Recent Labs:  Results Reviewed     None          I/O Past 24 hours:  No intake/output data recorded  No intake/output data recorded  Assessment / Plan:     Schizoaffective disorder, bipolar [facility]      Reason for continued inpatient care:  Because of underlying paranoia and inability to care for herself on her own and multiple somatic complaints  Acceptance by patient:  Accepting  Kitty Sicard in recovery:  About living in same personal care home at the [place] once she feels better  Understanding of medications :  Has some understanding  Involved in reintegration process:   On off unit privileges now  Trusting in relatoinship with psychiatrist:  Trusting    Recommended Treatment:    Medication changes:  1) none today  Non-pharmacological treatments  1) continue with  individual therapy group therapy, milieu therapy and occupational therapy and milieu therapy involving multidisciplinary team approach with psychotherapist, case management, nursing, peer support services, art therapist etc using recovery principles and psycho-education about accepting illness and need for treatment   2) encourage to cooperate with behavior plan  3) encourage to attend to ADLs like taking showers and wearing clean clothes   4) Encourage to attend groups   5) see how she does on off unit privileges and encourage to go off unit with staff escort  and expectations discussed with her and she did verbalize an understanding, consult respiratory to see if she needs portable oxygen and their findings are still pending  Safety  1) Safety/communication plan established targeting dynamic risk factors above  Discharge Plan back to the [facility] with act services and withdraw the [facility] referral due to improvement made so far  Counseling / Coordination of Care    Total floor / unit time spent today 15 minutes  Greater than 50% of total time was spent with the patient and / or family counseling and / or coordination of care  A description of the counseling / coordination of care  Patient's Rights, confidentiality and exceptions to confidentiality, use of automated medical record, [facility] staff access to medical record, and consent to treatment reviewed      Vincent Olivares MD

## 2020-01-09 NOTE — ASSESSMENT & PLAN NOTE
Psychiatry Progress Note  Patient did not attend the off unit activity yesterday called recovery anonymous even though she had told me that she would try to  Staff has reported that it was because of her not taking any showers in last several days  She is now happy that she is taken off the waiting list for [facility]AC[facility] because of remarkable improvement as she is taking showers at least once a week and attending more groups and [facility]  However she is frustrated and upset with the fact that she is not eligible to have a portable oxygen anymore as determined by respiratory  She is not reporting any thoughts about having cancer or dying from terrible illnesses but does question her medications sometimes and accuses some of the staff of tampering with her spirometer and inhaler  She still has a tendency to complain about swallowing difficulties and breathing difficulty and some other psychosomatic symptoms off and on and has not been eating meals all the time  Her hygiene is better she is well groomed and well kept today  I did remind her that she needs to keep up with her hygiene like taking showers twice a week and attending 50% of groups to be able to return back to the [facility]       Current medications:    Current Facility-Administered Medications:     acetaminophen (TYLENOL) tablet 650 mg, 650 mg, Oral, Q6H PRN, Manuel Copeland MD    albuterol (PROVENTIL HFA,VENTOLIN HFA) inhaler 2 puff, 2 puff, Inhalation, Q4H PRN, Manuel Copeland MD, 2 puff at 10/11/19 0424    aluminum-magnesium hydroxide-simethicone (MYLANTA) 200-200-20 mg/5 mL oral suspension 15 mL, 15 mL, Oral, Q4H PRN, Manuel Copeland MD    ammonium lactate (LAC-HYDRIN) 12 % lotion 1 application, 1 application, Topical, BID PRN, Manuel Copeland MD    benzonatate (TESSALON PERLES) capsule 100 mg, 100 mg, Oral, TID PRN, Manuel Copeland MD    benztropine (COGENTIN) injection 1 mg, 1 mg, Intramuscular, Q8H PRN, Faheem Daniels MD    carbamide peroxide [facility] [place]) 6 5 % otic solution 5 drop, 5 drop, Left Ear, BID, Rona Vázquez MD, 5 drop at 01/08/20 2142    cloZAPine (CLOZARIL) tablet 25 mg, 25 mg, Oral, BID, Faheem Daniels MD, 25 mg at 01/08/20 2142    cloZAPine (CLOZARIL) tablet 50 mg, 50 mg, Oral, BID, Faheem Daniels MD, 50 mg at 01/08/20 2142    EPINEPHrine PF (ADRENALIN) 1 mg/mL injection 0 15 mg, 0 15 mg, Intramuscular, Once PRN, Faheem Daniels MD    fluticasone-vilanterol (BREO ELLIPTA) 200-25 MCG/INH inhaler 1 puff, 1 puff, Inhalation, Daily, Faheem Daniels MD, 1 puff at 01/08/20 0849    ketotifen (ZADITOR) 0 025 % ophthalmic solution 1 drop, 1 drop, Right Eye, BID PRN, Faheem Daniels MD    levothyroxine tablet 125 mcg, 125 mcg, Oral, Early Morning, Faheem Dainels MD, 125 mcg at 01/09/20 0603    magnesium hydroxide (MILK OF MAGNESIA) 400 mg/5 mL oral suspension 30 mL, 30 mL, Oral, Daily PRN, Faheem Daniels MD    montelukast (SINGULAIR) tablet 10 mg, 10 mg, Oral, HS, Faheem Daniels MD, 10 mg at 12/24/19 2109    OLANZapine (ZyPREXA) IM injection 5 mg, 5 mg, Intramuscular, Q8H PRN, Faheem Daniels MD    OLANZapine (ZyPREXA) tablet 5 mg, 5 mg, Oral, Q8H PRN, Faheem Daniels MD    ondansetron (ZOFRAN-ODT) dispersible tablet 4 mg, 4 mg, Oral, Q6H PRN, Faheem Daniels MD, 4 mg at 12/09/19 1757    pantoprazole (PROTONIX) EC tablet 40 mg, 40 mg, Oral, Early Morning, Faheem Daniels MD, 40 mg at 01/09/20 0603    polyethylene glycol (MIRALAX) packet 17 g, 17 g, Oral, Daily PRN, Faheem Daniels MD    polyvinyl alcohol (LIQUIFILM TEARS) 1 4 % ophthalmic solution 1 drop, 1 drop, Both Eyes, Q3H PRN, Faheem Daniels MD    sertraline (ZOLOFT) tablet 50 mg, 50 mg, Oral, BID, Faheem Daniels MD, 50 mg at 01/08/20 2142    sucralfate (CARAFATE) oral suspension 1,000 mg, 1,000 mg, Oral, BID, Cat Torres MD, 1,000 mg at 01/09/20 0603    theophylline (JEF-24) 24 hr capsule 200 mg, 200 mg, Oral, Daily, Faheem Daniels MD, 200 mg at 01/08/20 0849    tiotropium (SPIRIVA) capsule for inhaler 18 mcg, 18 mcg, Inhalation, Daily, Sindy Day MD, 18 mcg at 01/08/20 0849    traZODone (DESYREL) tablet 25 mg, 25 mg, Oral, HS PRN, Sindy Day MD  Justification if on more than two antipsychotics:  Only on his clozapine  Side effects if any:  None    Risks , benefits, side effects and precautions of medications discussed with patient and reminded patient to let us know any problems with medications     Objective:     Vital Signs:  Vitals:    01/08/20 0737 01/08/20 1631 01/08/20 2045 01/09/20 0638   BP: 106/54 120/84 108/55    BP Location: Right arm Right arm Left arm    Pulse: 78 84 87    Resp:  16 16    Temp:  98 °F (36 7 °C) (!) 97 4 °F (36 3 °C)    TempSrc:  Temporal Temporal    SpO2:  92% 91% 95%   Weight:       Height:         Appearance:  age appropriate, casually dressed and overweight older than stated age, appears better groomed and combed hair and pleasant and not preoccupied about her breathing or need for portable oxygen and found sitting in the dining arrieta   Behavior:  evasive and guarded with no psychosomatic complaints, friendly but argumentative at times but less often   Speech:  normal pitch and normal volume but circumstantial and tangential with stilted speech    Mood:  anxious and dysthymic   Affect:  mood-congruent, elated and entitled anxious and irritated and sad at times but pleasant today    Thought Process:  goal directed and illogical slightly pressured and tangential and talks as if in a court of law   Thought Content:  No current suicidal homicidal thoughts intent or plans reported  No phobias obsessions or compulsions reported  No overt delusions elicited today    However she still has occasional psychosomatic symptoms like having breathing difficulty and swallowing difficulty and still upset with the fact that she is no longer eligible for portable oxygen and states she will try to go off the unit again with staff Perceptual Disturbances: None and does not appear responding to internal stimuli    Risk Potential: Tendency to not care for herself    Sensorium:  person, place, time/date, situation, day of week, month of year and time   Cognition:  grossly intact with no deficits in recent or remote memory or immediate recall   Consciousness:  alert and awake    Attention: Intact concentration and attention span   Intellect: Considered to be at least of average intelligence   Insight:  limited but improving   Judgment: improving      Motor Activity: no abnormal movements         Recent Labs:  Results Reviewed     None          I/O Past 24 hours:  No intake/output data recorded  No intake/output data recorded  Assessment / Plan:     [facility]oaffective [facility]      Reason for continued inpatient care:  Because of underlying paranoia and inability to care for herself on her own and multiple somatic complaints  Acceptance by patient:  Accepting  Clarisa Mcgregor in recovery:  About living in same personal care home at the [place] once she feels better  Understanding of medications :  Has some understanding  Involved in reintegration process: On off unit privileges now  Trusting in relatoinship with psychiatrist:  Trusting    Recommended Treatment:    Medication changes:  1) none today  Non-pharmacological treatments  1) continue with  individual therapy group therapy, milieu therapy and occupational therapy and milieu therapy involving multidisciplinary team approach with psychotherapist, case management, nursing, peer support services, art therapist etc using recovery principles and psycho-education about accepting illness and need for treatment     2) encourage to cooperate with behavior plan  3) encourage to attend to ADLs like taking showers and wearing clean clothes   4) Encourage to attend groups   5) see how she does on off unit privileges and encourage to go off unit with staff escort  and expectations discussed with her and she did verbalize an understanding, consult respiratory to see if she needs portable oxygen and their findings are still pending  Safety  1) Safety/communication plan established targeting dynamic risk factors above  Discharge Plan back to the [facility] with act services and withdraw the [facility] referral due to improvement made so far  Counseling / Coordination of Care    Total floor / unit time spent today 15 minutes  Greater than 50% of total time was spent with the patient and / or family counseling and / or coordination of care  A description of the counseling / coordination of care  Patient's Rights, confidentiality and exceptions to confidentiality, use of automated medical record, [facility] staff access to medical record, and consent to treatment reviewed      Deedee Muir MD

## 2020-01-09 NOTE — PLAN OF CARE
Problem: Alteration in Thoughts and Perception  Goal: Verbalize thoughts and feelings  Description  Interventions:  - Promote a nonjudgmental and trusting relationship with the patient through active listening and therapeutic communication  - Assess patient's level of functioning, behavior and potential for risk  - Engage patient in 1 on 1 interactions for a minimum of 15 minutes each session  - Encourage patient to express fears, feelings, frustrations, and discuss symptoms    - Champion patient to reality, help patient recognize reality-based thinking   - Administer medications as ordered and assess for potential side effects  - Provide the patient education related to the signs and symptoms of the illness and desired effects of prescribed medications  Outcome: Progressing  Goal: Agree to be compliant with medication regime, as prescribed and report medication side effects  Description  Interventions:  - Offer appropriate PRN medication and supervise ingestion; conduct aims, as needed   Outcome: Progressing  Goal: Attend and participate in unit activities, including therapeutic, recreational, and educational groups  Description  Interventions:  - Provide therapeutic and educational activities daily, encourage attendance and participation, and document same in the medical record     CERTIFIED PEER SPECIALIST INTERVENTIONS:    Complete peer assessment with patient to assess their needs and identify their goals to complete while in the recovery program as well as once discharged into the community  Patient will complete WRAP Plan, Crisis Plan and 5 Life Domains  Patient will attend 50% of groups offered on the unit  Patient will complete a goal card weekly      Outcome: Progressing     Problem: Depression  Goal: Refrain from isolation  Description  Interventions:  - Develop a trusting relationship   - Encourage socialization   Outcome: Progressing     Problem: Alteration in Thoughts and Perception  Goal: Complete daily ADLs, including personal hygiene independently, as able  Description  Interventions:  - Observe, teach, and assist patient with ADLS  - Monitor and promote a balance of rest/activity, with adequate nutrition and elimination   Outcome: Not Progressing     2200 Maggie early in shift isolative to bed, coming out for scheduled medicine & to eat 100% of her meal plus Ensure  Then, mostly out in dining room remainder of evening  Attended PM Games/Social group, doing cross word puzzles & socializing  Stayed out long after group & having HS snack to socialize  Came to window for HS medicine except the Singulair  Shares was pleased to learn brother was well; sister just helping him & partner w/some chores @ his home  She used the [employer] averaging 1100-1250ml volume  Wearing now her QHS humidified nasal O2 @ 1L for bed  Some suspicion that the 11-7 nurse may be trying to sabotage her being able to use O2 @ night by taking O2 sats in morning which she says run higher due to O2  Reassured her it is just good nursing practice

## 2020-01-09 NOTE — PROGRESS NOTES
01/09/20 1100   Activity/Group Checklist   Group   (IMR/Dysfunctional Family Roles )   Attendance Attended   Attendance Duration (min) 46-60   Interactions Interacted appropriately   Affect/Mood Appropriate;Normal range;Bright   Goals Achieved Identified feelings; Identified triggers; Discussed coping strategies; Identified distorted thoughts/beliefs; Able to reflect/comment on own behavior;Able to engage in interactions; Able to listen to others; Able to self-disclose

## 2020-01-10 NOTE — ASSESSMENT & PLAN NOTE
Psychiatry Progress Note  Patient again has not taken any showers  in last 5 days  She is still some what disheveled nor combed well today  She is now happy that she is taken off the waiting list for [facility] because she was taking showers at least once a week and attending more groups and is hoping to be accepted back at the [facility]  She is not reporting any thoughts about having cancer or dying from terrible illnesses nor does she talk about having an implant 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  She claims she is no longer tampering with her spirometer and inhaler or with her medications lately  She still has a tendency to complain about swallowing difficulties and breathing difficulty and some other psychosomatic symptoms off and on and has not been eating all her meals all the time  I did remind her again  that she needs to keep up with her hygiene like taking showers twice a week and attending 50% of groups to be able to return back to the [facility] for which CM has now made a referral    She does admit to some low grade depression    She did cooperate with the 305 commitment held today   Current medications:    Current Facility-Administered Medications:     acetaminophen (TYLENOL) tablet 650 mg, 650 mg, Oral, Q6H PRN, Shi Pham MD    albuterol (PROVENTIL HFA,VENTOLIN HFA) inhaler 2 puff, 2 puff, Inhalation, Q4H PRN, Shi Pham MD, 2 puff at 10/11/19 0424    aluminum-magnesium hydroxide-simethicone (MYLANTA) 200-200-20 mg/5 mL oral suspension 15 mL, 15 mL, Oral, Q4H PRN, Shi Pham MD    ammonium lactate (LAC-HYDRIN) 12 % lotion 1 application, 1 application, Topical, BID PRN, Shi Pham MD    benzonatate (TESSALON PERLES) capsule 100 mg, 100 mg, Oral, TID PRN, Shi Pham MD    benztropine (COGENTIN) injection 1 mg, 1 mg, Intramuscular, Q8H PRN, Shi Pham MD    carbamide peroxide (DEBROX) 6 5 % otic solution 5 drop, 5 drop, Left Ear, BID, Rona Pacheco MD [place], 5 drop at 01/09/20 2128    cloZAPine (CLOZARIL) tablet 25 mg, 25 mg, Oral, BID, Greer Beltran MD, 25 mg at 01/09/20 2129    cloZAPine (CLOZARIL) tablet 50 mg, 50 mg, Oral, BID, Greer Beltran MD, 50 mg at 01/09/20 2129    EPINEPHrine PF (ADRENALIN) 1 mg/mL injection 0 15 mg, 0 15 mg, Intramuscular, Once PRN, Greer Beltran MD    fluticasone-vilanterol (BREO ELLIPTA) 200-25 MCG/INH inhaler 1 puff, 1 puff, Inhalation, Daily, Greer Beltran MD, 1 puff at 01/09/20 0853    ketotifen (ZADITOR) 0 025 % ophthalmic solution 1 drop, 1 drop, Right Eye, BID PRN, Greer Beltran MD    levothyroxine tablet 125 mcg, 125 mcg, Oral, Early Morning, Greer Beltran MD, 125 mcg at 01/10/20 0603    magnesium hydroxide (MILK OF MAGNESIA) 400 mg/5 mL oral suspension 30 mL, 30 mL, Oral, Daily PRN, Greer Beltran MD    montelukast (SINGULAIR) tablet 10 mg, 10 mg, Oral, HS, Greer Beltran MD, 10 mg at 12/24/19 2109    OLANZapine (ZyPREXA) IM injection 5 mg, 5 mg, Intramuscular, Q8H PRN, Greer Beltran MD    OLANZapine (ZyPREXA) tablet 5 mg, 5 mg, Oral, Q8H PRN, Greer Beltran MD    ondansetron (ZOFRAN-ODT) dispersible tablet 4 mg, 4 mg, Oral, Q6H PRN, Greer Beltran MD, 4 mg at 12/09/19 1757    pantoprazole (PROTONIX) EC tablet 40 mg, 40 mg, Oral, Early Morning, Greer Beltran MD, 40 mg at 01/10/20 0603    polyethylene glycol (MIRALAX) packet 17 g, 17 g, Oral, Daily PRN, Greer Beltran MD    polyvinyl alcohol (LIQUIFILM TEARS) 1 4 % ophthalmic solution 1 drop, 1 drop, Both Eyes, Q3H PRN, Greer Beltran MD    sertraline (ZOLOFT) tablet 150 mg, 150 mg, Oral, Daily, Greer Beltran MD    sucralfate (CARAFATE) oral suspension 1,000 mg, 1,000 mg, Oral, BID, Cat Torres MD, 1,000 mg at 01/10/20 0603    theophylline (JEF-24) 24 hr capsule 200 mg, 200 mg, Oral, Daily, Greer Beltran MD, 200 mg at 01/09/20 0854    tiotropium (SPIRIVA) capsule for inhaler 18 mcg, 18 mcg, Inhalation, Daily, Greer Beltran MD, 18 mcg at 01/09/20 0853    traZODone (DESYREL) tablet 25 mg, 25 mg, Oral, HS PRN, Christian Bennett MD  Justification if on more than two antipsychotics:  Only on his clozapine  Side effects if any:  None    Risks , benefits, side effects and precautions of medications discussed with patient and reminded patient to let us know any problems with medications     Objective:     Vital Signs:  Vitals:    01/09/20 1600 01/09/20 2000 01/10/20 0700 01/10/20 0705   BP: 111/68 95/60 104/70 108/55   BP Location: Left arm Left arm Left arm Left arm   Pulse: 85 70 81 71   Resp: 16 14 19 19   Temp: (!) 97 4 °F (36 3 °C) (!) 97 2 °F (36 2 °C) 98 °F (36 7 °C)    TempSrc: Temporal Temporal Temporal    SpO2: 95% 93% 93%    Weight:       Height:         Appearance:  age appropriate, casually dressed and overweight older than stated age, appears poorly groomed with uncombed hair and  not preoccupied about her breathing or need for portable oxygen today   Behavior:  evasive and guarded with no psychosomatic complaints, friendly but argumentative at times but less often   Speech:  normal pitch and normal volume but circumstantial and tangential with stilted speech off and on   Mood:  anxious and dysthymic   Affect:  mood-congruent, elated and entitled anxious and irritated and sad at times   Thought Process:  goal directed and illogical slightly pressured and tangential and talks as if in a court of law   Thought Content:  No current suicidal homicidal thoughts intent or plans reported  No overt delusions elicited today    However she still has occasional psychosomatic symptoms like having breathing difficulty and swallowing difficulty and still upset with the fact that she is no longer eligible for portable oxygen and states she will try to go off the unit again with staff   Perceptual Disturbances: None and does not appear responding to internal stimuli    Risk Potential: Tendency to not care for herself    Sensorium:  person, place, time/date, situation, day of week, month of year and time   Cognition:  grossly intact with no deficits in recent or remote memory or immediate recall   Consciousness:  alert and awake    Attention: Intact concentration and attention span   Intellect: Considered to be at least of average intelligence   Insight:  limited but improving   Judgment: Improving slowly      Motor Activity: no abnormal movements         Recent Labs:  Results Reviewed     None          I/O Past 24 hours:  No intake/output data recorded  No intake/output data recorded  Assessment / Plan:     Schizoaffective disorder, bipolar type ([facility] [address] )      Reason for continued inpatient care:  Because of underlying paranoia and inability to care for herself on her own and multiple somatic complaints  Acceptance by patient:  Accepting  Filomena Powell in recovery:  About living in same personal care home at the [place] once she feels better  Understanding of medications :  Has some understanding  Involved in reintegration process: On off unit privileges now  Trusting in relatoinship with psychiatrist:  Trusting    Recommended Treatment:    Medication changes:  1) will increase zoloft to 150 mg po am to see if that might help with her motivation, repeat EKG but clozapine level is low but unable to increase it as QTc on EKg is over 450    Non-pharmacological treatments  1) continue with  individual therapy group therapy, milieu therapy and occupational therapy and milieu therapy involving multidisciplinary team approach with psychotherapist, case management, nursing, peer support services, art therapist etc using recovery principles and psycho-education about accepting illness and need for treatment     2) encourage to cooperate with behavior plan  3) encourage to attend to ADLs like taking showers and wearing clean clothes   4) Encourage to attend groups   5) see how she does on off unit privileges and encourage to go off unit with staff escort  and expectations discussed with her and she did verbalize an understanding, respiratory has decided she does not need any  portable oxygen   Safety  1) Safety/communication plan established targeting dynamic risk factors above  Discharge Plan back to the [facility] with act services and withdraw the [facility] referral due to improvement made so far  Counseling / Coordination of Care    Total floor / unit time spent today 15 minutes  Greater than 50% of total time was spent with the patient and / or family counseling and / or coordination of care  A description of the counseling / coordination of care  Patient's Rights, confidentiality and exceptions to confidentiality, use of automated medical record, [address] [place] [address] staff access to medical record, and consent to treatment reviewed      Jennifer Taveras MD

## 2020-01-10 NOTE — PLAN OF CARE
Problem: Alteration in Thoughts and Perception  Goal: Verbalize thoughts and feelings  Description  Interventions:  - Promote a nonjudgmental and trusting relationship with the patient through active listening and therapeutic communication  - Assess patient's level of functioning, behavior and potential for risk  - Engage patient in 1 on 1 interactions for a minimum of 15 minutes each session  - Encourage patient to express fears, feelings, frustrations, and discuss symptoms    - [place] patient to reality, help patient recognize reality-based thinking   - Administer medications as ordered and assess for potential side effects  - Provide the patient education related to the signs and symptoms of the illness and desired effects of prescribed medications  Outcome: Progressing  Goal: Agree to be compliant with medication regime, as prescribed and report medication side effects  Description  Interventions:  - Offer appropriate PRN medication and supervise ingestion; conduct aims, as needed   Outcome: Progressing     Problem: Risk for Self Injury/Neglect  Goal: Verbalize thoughts and feelings  Description  Interventions:  - Assess and re-assess patient's lethality and potential for self-injury  - Engage patient in 1:1 interactions, daily, for a minimum of 15 minutes  - Encourage patient to express feelings, fears, frustrations, hopes  - Establish rapport/trust with patient   Outcome: Progressing  Goal: Refrain from harming self  Description  Interventions:  - Monitor patient closely, per order  - Develop a trusting relationship  - Supervise medication ingestion, monitor effects and side effects   Outcome: Janine Prince is alert and oriented X3, cooperative  Tolerating medications whole and meal compliant with fair appetite  Required prompting for meals and meds, questioned the increase in sertraline, verified order with Dr Ayaan Suh and patient was then compliant and took it   Remained in bed throughout shift, offers no complaints, denies anxiety but states she is still depressed  No AH/VH, SI/HI at this time  Will continue to encourage pt to get OOB and interact with peers, attends groups in order to express self in a safe and effective manner

## 2020-01-10 NOTE — PROGRESS NOTES
01/10/20 0800   Team Meeting   Meeting Type Daily Rounds   Team Members Present   Team Members Present Physician;Nurse;; Other (Discipline and Name)   Physician Team Member Dr Jeannette Jiang Team Member Arnaud Gutierrez RN   Care Management Team Member Brenda Hay   Other (Discipline and Name) Porfirio Javier LCSW     Patient attended [facility] yesterday  Patient was observed still sitting at the phone at the nurses station despite being asked by the treatment team earlier in the week to not do so unless using the phone  Food intake has improved  Slept

## 2020-01-10 NOTE — PROGRESS NOTES
Pharmacy Clozapine Monitoring Progress Note     Grecia Dooley is receiving a total daily dose of 150 mg of clozapine divided as 75mg at 1600 and 75mg at 2000  Pharmacist Recommendations Based on Assessment and Plan (below):    1  Patient is Clozapine-REMS eligible, ANC was updated, with weekly monitoring frequency and the next [address] is due on 1/17/20 (ordered)  2  Recommend repeat ECG for patient for clozapine myocarditis monitoring  Assessment/Plan:    Phase of Treatment:     Current phase of treatment is initation/titration  Patient Clozapine REMS Eligibility:     Patient is eligible to receive clozapine and the [address] monitoring frequency is weekly per clozapine REMS  The patient's latest [address] value has been updated into the Clozapine-REMS program          ANC and Clozapine Level (if available)     The most recent [address] was   Lab Results   Component Value Date    NEUTROABS 5 20 01/10/2020    and the next lab is due on 1/17/20  Last Clozapine level (if available):    0   Lab Value Date/Time    CLOZAPINE 205 (L) 01/03/2020 0547     0   Lab Value Date/Time    NCLOZIP 119 01/03/2020 0547           Monitoring Parameters for Clozapine:     Clozapine Adverse Effect Suggested Monitoring Patient's Current Status    Agranulocytosis ANC baseline and then repeat weekly for first 6 months and every 2 weeks for the second 6 months  Maintenance after one year of therapy every month  The most recent [address] was   Lab Results   Component Value Date    NEUTROABS 5 20 01/10/2020       This ANC is considered normal range (ANC >/= 1500/mcL)  Continue current treatment and monitoring course  Respiratory Depression Please ensure patient is not on concomitant respiratory lowering medications such as benzodiazepines, sedative hypnotics (eg  zolpidem) This patient is not on respiratory depression lowering medications   Myocarditis Baseline and weekly EKG, CRP, BNP, and troponin    Weekly symptomatic complaints of fatigue, dyspnea, tachycardia, chest pain, palpitations, and fever for the first 4 weeks  Last EKG Results on  12/11/19 showed: "Normal sinus rhythm  Left axis deviation  Abnormal ECG  When compared with ECG of 18-NOV-2019 13:19,  Premature ventricular complexes are no longer Present  Confirmed by Dilan Soni ([id number]) on 12/12/2019 8:45:13 AM"        Last QTC value was:  0   Lab Value Date/Time    QTCINT 467 12/11/2019 2041       Last BNP was: No results found for: NTBNP    Last Troponin was:  0   Lab Value Date/Time    TROPONINI <0 01 05/01/2019 1318    TROPONINI <0 04 05/10/2015 1948        Orthostatic hypotension/  bradycardia Blood pressure and vital signs      Monitor blood pressure and orthostatic hypotension at least twice daily upon initiation and continue to follow at least once daily afterwards  Patient's last recorded vital signs:       /61 (BP Location: Left arm)   Pulse 84   Temp (!) 97 2 °F (36 2 °C) (Temporal)   Resp 16   Ht 5' 4" (1 626 m)   Wt 66 kg (145 lb 8 oz)   SpO2 95%   BMI 24 98 kg/m²             Constipation (bowel obstruction due to high anticholinergic clozapine burden) Assess at baseline and weekly during first four months of therapy  Docusate 100mg BID and Miralax 17 grams daily recommended initially  Prophylactic bowel regimen ordered and includes: sucralfate   Sialorrhea Assess at baseline and weekly during first four months of therapy  May be managed using sublingual anticholinergic preparations (atropine 1% eye drops)  If needed atropine 1% eye drops can be used sublingually or glycopyrrolate or terazosin if patient non-allergic  Please note that per current REMS protocol the provider can submit a treatment rationale that justifies clozapine treatment even if patient parameters are outside of REMS recommendations  The Clozapine REMS is an FDA-mandated program implemented by the manufacturers of clozapine  ([employer]  com/CpmgClozapineUI/home  u)  Pharmacy will continue to follow patient with team, thank you    Electronically signed by: John Lamar, PharmD, [address], Clinical Pharmacist - Psychiatry

## 2020-01-10 NOTE — PROGRESS NOTES
Progress Note - Lizbeth Jhaveri 1955, 60 y o  female MRN: [id number]    [facility] Encounter: [id number]    Primary Care Provider: Alonso Carmona PA-C   Date and time admitted to hospital: 7/23/2019  5:30 PM        * Schizoaffective disorder, bipolar type [facility])  Assessment & Plan  Psychiatry Progress Note  Patient again has not taken any showers  in last 5 days  She is still some what disheveled nor combed well today  She is now happy that she is taken off the waiting list for [facility] because she was taking showers at least once a week and attending more groups and is hoping to be accepted back at the [facility]  She is not reporting any thoughts about having cancer or dying from terrible illnesses nor does she talk about having an implant 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  She claims she is no longer tampering with her spirometer and inhaler or with her medications lately  She still has a tendency to complain about swallowing difficulties and breathing difficulty and some other psychosomatic symptoms off and on and has not been eating all her meals all the time  I did remind her again  that she needs to keep up with her hygiene like taking showers twice a week and attending 50% of groups to be able to return back to the [facility] for which CM has now made a referral    She does admit to some low grade depression    She did cooperate with the 305 commitment held today   Current medications:    Current Facility-Administered Medications:     acetaminophen (TYLENOL) tablet 650 mg, 650 mg, Oral, Q6H PRN, Sarah Hanna MD    albuterol (PROVENTIL HFA,VENTOLIN HFA) inhaler 2 puff, 2 puff, Inhalation, Q4H PRN, Sarah Hanna MD, 2 puff at 10/11/19 0424    aluminum-magnesium hydroxide-simethicone (MYLANTA) 200-200-20 mg/5 mL oral suspension 15 mL, 15 mL, Oral, Q4H PRN, Sarah Hanna MD    ammonium lactate (LAC-HYDRIN) 12 % lotion 1 application, 1 application, Topical, BID PRN, Isidoro Gonzalez MD    benzonatate (TESSALON PERLES) capsule 100 mg, 100 mg, Oral, TID PRN, Isidoro Gonzalez MD    benztropine (COGENTIN) injection 1 mg, 1 mg, Intramuscular, Q8H PRN, Isidoro Gonzalez MD    carbamide peroxide (DEBROX) 6 5 % otic solution 5 drop, 5 drop, Left Ear, BID, Rona Roman MD, 5 drop at 01/09/20 2128    cloZAPine (CLOZARIL) tablet 25 mg, 25 mg, Oral, BID, Isidoro Gonzalez MD, 25 mg at 01/09/20 2129    cloZAPine (CLOZARIL) tablet 50 mg, 50 mg, Oral, BID, Isidoro Gonzalez MD, 50 mg at 01/09/20 2129    EPINEPHrine PF (ADRENALIN) 1 mg/mL injection 0 15 mg, 0 15 mg, Intramuscular, Once PRN, Isidoro Gonzalez MD    fluticasone-vilanterol (BREO ELLIPTA) 200-25 MCG/INH inhaler 1 puff, 1 puff, Inhalation, Daily, Isidoro Gonzalez MD, 1 puff at 01/09/20 0853    ketotifen (ZADITOR) 0 025 % ophthalmic solution 1 drop, 1 drop, Right Eye, BID PRN, Isidoro Gonzalez MD    levothyroxine tablet 125 mcg, 125 mcg, Oral, Early Morning, Isidoro Gonzalez MD, 125 mcg at 01/10/20 0603    magnesium hydroxide (MILK OF MAGNESIA) 400 mg/5 mL oral suspension 30 mL, 30 mL, Oral, Daily PRN, Isidoro Gonzalez MD    montelukast (SINGULAIR) tablet 10 mg, 10 mg, Oral, HS, Isidoro Gonzalez MD, 10 mg at 12/24/19 2109    OLANZapine (ZyPREXA) IM injection 5 mg, 5 mg, Intramuscular, Q8H PRN, Isidoro Gonzalez MD    OLANZapine (ZyPREXA) tablet 5 mg, 5 mg, Oral, Q8H PRN, Isidoro Gonzalez MD    ondansetron (ZOFRAN-ODT) dispersible tablet 4 mg, 4 mg, Oral, Q6H PRN, Isidoro Gonzalez MD, 4 mg at 12/09/19 1757    pantoprazole (PROTONIX) EC tablet 40 mg, 40 mg, Oral, Early Morning, Isidoro Gonzalez MD, 40 mg at 01/10/20 0603    polyethylene glycol (MIRALAX) packet 17 g, 17 g, Oral, Daily PRN, Isidoro Gonzalez MD    polyvinyl alcohol (LIQUIFILM TEARS) 1 4 % ophthalmic solution 1 drop, 1 drop, Both Eyes, Q3H PRN, Isidoro Gonzalez MD    sertraline (ZOLOFT) tablet 150 mg, 150 mg, Oral, Daily, Isidoro Gonzalez MD    sucralfate (CARAFATE) oral suspension 1,000 mg, 1,000 mg, Oral, BID, Jd Koehler MD, 1,000 mg at 01/10/20 0603    theophylline (JEF-24) 24 hr capsule 200 mg, 200 mg, Oral, Daily, Dalton Garner MD, 200 mg at 01/09/20 0854    tiotropium (SPIRIVA) capsule for inhaler 18 mcg, 18 mcg, Inhalation, Daily, Dalton Garner MD, 18 mcg at 01/09/20 0853    traZODone (DESYREL) tablet 25 mg, 25 mg, Oral, HS PRN, Dalton Garner MD  Justification if on more than two antipsychotics:  Only on his clozapine  Side effects if any:  None    Risks , benefits, side effects and precautions of medications discussed with patient and reminded patient to let us know any problems with medications     Objective:     Vital Signs:  Vitals:    01/09/20 1600 01/09/20 2000 01/10/20 0700 01/10/20 0705   BP: 111/68 95/60 104/70 108/55   BP Location: Left arm Left arm Left arm Left arm   Pulse: 85 70 81 71   Resp: 16 14 19 19   Temp: (!) 97 4 °F (36 3 °C) (!) 97 2 °F (36 2 °C) 98 °F (36 7 °C)    TempSrc: Temporal Temporal Temporal    SpO2: 95% 93% 93%    Weight:       Height:         Appearance:  age appropriate, casually dressed and overweight older than stated age, appears poorly groomed with uncombed hair and  not preoccupied about her breathing or need for portable oxygen today   Behavior:  evasive and guarded with no psychosomatic complaints, friendly but argumentative at times but less often   Speech:  normal pitch and normal volume but circumstantial and tangential with stilted speech off and on   Mood:  anxious and dysthymic   Affect:  mood-congruent, elated and entitled anxious and irritated and sad at times   Thought Process:  goal directed and illogical slightly pressured and tangential and talks as if in a court of law   Thought Content:  No current suicidal homicidal thoughts intent or plans reported  No overt delusions elicited today    However she still has occasional psychosomatic symptoms like having breathing difficulty and swallowing difficulty and still upset with the fact that she is no longer eligible for portable oxygen and states she will try to go off the unit again with staff   Perceptual Disturbances: None and does not appear responding to internal stimuli    Risk Potential: Tendency to not care for herself    Sensorium:  person, place, time/date, situation, day of week, month of year and time   Cognition:  grossly intact with no deficits in recent or remote memory or immediate recall   Consciousness:  alert and awake    Attention: Intact concentration and attention span   Intellect: Considered to be at least of average intelligence   Insight:  limited but improving   Judgment: Improving slowly      Motor Activity: no abnormal movements         Recent Labs:  Results Reviewed     None          I/O Past 24 hours:  No intake/output data recorded  No intake/output data recorded  Assessment / Plan:     [facility]      Reason for continued inpatient care:  Because of underlying paranoia and inability to care for herself on her own and multiple somatic complaints  Acceptance by patient:  Accepting  Pj Saldaña in recovery:  About living in same personal care home at the [place] once she feels better  Understanding of medications :  Has some understanding  Involved in reintegration process: On off unit privileges now  Trusting in relatoinship with psychiatrist:  Trusting    Recommended Treatment:    Medication changes:  1) will increase zoloft to 150 mg po am to see if that might help with her motivation, repeat EKG but clozapine level is low but unable to increase it as QTc on EKg is over 450    Non-pharmacological treatments  1) continue with  individual therapy group therapy, milieu therapy and occupational therapy and milieu therapy involving multidisciplinary team approach with psychotherapist, case management, nursing, peer support services, art therapist etc using recovery principles and psycho-education about accepting illness and need for treatment     2) encourage to cooperate with behavior plan  3) encourage to attend to ADLs like taking showers and wearing clean clothes   4) Encourage to attend groups   5) see how she does on off unit privileges and encourage to go off unit with staff escort  and expectations discussed with her and she did verbalize an understanding, respiratory has decided she does not need any  portable oxygen   Safety  1) Safety/communication plan established targeting dynamic risk factors above  Discharge Plan back to the [facility] with act services and withdraw the [facility] referral due to improvement made so far  Counseling / Coordination of Care    Total floor / unit time spent today 15 minutes  Greater than 50% of total time was spent with the patient and / or family counseling and / or coordination of care  A description of the counseling / coordination of care  Patient's Rights, confidentiality and exceptions to confidentiality, use of automated medical record, [facility] staff access to medical record, and consent to treatment reviewed      Meldon Curling, MD

## 2020-01-10 NOTE — PLAN OF CARE
Problem: Alteration in Thoughts and Perception  Goal: Agree to be compliant with medication regime, as prescribed and report medication side effects  Description  Interventions:  - Offer appropriate PRN medication and supervise ingestion; conduct aims, as needed   Outcome: Progressing  Goal: Attend and participate in unit activities, including therapeutic, recreational, and educational groups  Description  Interventions:  - Provide therapeutic and educational activities daily, encourage attendance and participation, and document same in the medical record     CERTIFIED PEER SPECIALIST INTERVENTIONS:    Complete peer assessment with patient to assess their needs and identify their goals to complete while in the recovery program as well as once discharged into the community  Patient will complete WRAP Plan, Crisis Plan and 5 Life Domains  Patient will attend 50% of groups offered on the unit  Patient will complete a goal card weekly  Outcome: Progressing     Problem: Alteration in Thoughts and Perception  Goal: Complete daily ADLs, including personal hygiene independently, as able  Description  Interventions:  - Observe, teach, and assist patient with ADLS  - Monitor and promote a balance of rest/activity, with adequate nutrition and elimination   Outcome: Not Progressing     Problem: Depression  Goal: Refrain from isolation  Description  Interventions:  - Develop a trusting relationship   - Encourage socialization   Outcome: Not Progressing     [address] was a bit isolative to room & bed in free time  But, did come up on own for scheduled medicines (except the Singulair)  She ate 75% of her meal, egg salad w/crackers, apple crisp bites, Ensure  For HS snack had yogurt & ice cream  Is saying today having more difficulty swallowing again  Did attend both Women's Group & PM Group   Used the [employer] to achieve volumes of 1100ml, not her best  Wearing now her QHS humidified nasal O2 @ 1L for bed

## 2020-01-11 NOTE — QUICK NOTE
Call received from RN on 01/10 around 8:00 p m  Expressed concern about automatic ECG interpretation suggesting new inferior infarct  ECG monitoring is being done to monitor for side effects of Clozapine  Our team compared the ECG to her previous ECGs  Inferior leads II, III, and aVF with signs of interventricular conduction delay and inversion  However, this was present on previous ECGs and patient did not complain of chest pain at time of ECG this evening  Therefore it is unlikely a new inferior infarction

## 2020-01-11 NOTE — PROGRESS NOTES
Psychiatry Progress Note    Subjective: Interval History     Patient isolative to her room this morning  Patient reports that she is feeling the same as she usually does  Reports having some depression and anxiety due to ongoing concerns of her physical health  Patient however with no specific somatic complaints this morning  Reports that she is not feeling hopeless or having any thoughts about ending her life  Patient denying any homicidal ideations  Patient with no overt psychosis  Still with poor hygiene  Has not showered approximately 5 days  Has been compliant with; her medications staff does report that patient was initially apprehensive about complying with her increase Zoloft dosing but with Education and support was compliant  P o  Intake has continued to improve  Slept well last evening  Of note patient did have routine EKG completed due to being on Clozaril  Was reviewed by a medical team as abnormality was identified however in comparison to previous EKGs and no acute symptoms no concerned that it was a new infarct      Behavior over the last 24 hours:  unchanged  Sleep: normal  Appetite: normal  Medication side effects: No  ROS: no complaints    Current medications:    Current Facility-Administered Medications:     acetaminophen (TYLENOL) tablet 650 mg, 650 mg, Oral, Q6H PRN, Felisa Florence MD    albuterol (PROVENTIL HFA,VENTOLIN HFA) inhaler 2 puff, 2 puff, Inhalation, Q4H PRN, Felisa Florence MD, 2 puff at 10/11/19 0424    aluminum-magnesium hydroxide-simethicone (MYLANTA) 200-200-20 mg/5 mL oral suspension 15 mL, 15 mL, Oral, Q4H PRN, Felisa Florence MD    ammonium lactate (LAC-HYDRIN) 12 % lotion 1 application, 1 application, Topical, BID PRN, Felisa Florence MD    benzonatate (TESSALON PERLES) capsule 100 mg, 100 mg, Oral, TID PRN, Felisa Florence MD    benztropine (COGENTIN) injection 1 mg, 1 mg, Intramuscular, Q8H PRN, Felisa Florence MD    carbamide peroxide (DEBROX) 6 5 % otic solution 5 drop, 5 drop, Left Ear, BID, Rona Mac MD, 5 drop at 01/10/20 0836    cloZAPine (CLOZARIL) tablet 25 mg, 25 mg, Oral, BID, Sandhya Bolaños MD, 25 mg at 01/10/20 2100    cloZAPine (CLOZARIL) tablet 50 mg, 50 mg, Oral, BID, Sandhya Bolaños MD, 50 mg at 01/10/20 2100    EPINEPHrine PF (ADRENALIN) 1 mg/mL injection 0 15 mg, 0 15 mg, Intramuscular, Once PRN, Sandhya Bolaños MD    fluticasone-vilanterol (BREO ELLIPTA) 200-25 MCG/INH inhaler 1 puff, 1 puff, Inhalation, Daily, Sandhya Bolaños MD, 1 puff at 01/11/20 0835    ketotifen (ZADITOR) 0 025 % ophthalmic solution 1 drop, 1 drop, Right Eye, BID PRN, Sandhya Bolaños MD    levothyroxine tablet 125 mcg, 125 mcg, Oral, Early Morning, Sandhya Bolaños MD, 125 mcg at 01/11/20 0549    magnesium hydroxide (MILK OF MAGNESIA) 400 mg/5 mL oral suspension 30 mL, 30 mL, Oral, Daily PRN, Sandhya Bolaños MD    montelukast (SINGULAIR) tablet 10 mg, 10 mg, Oral, HS, Sandhya Bolaños MD, 10 mg at 12/24/19 2109    OLANZapine (ZyPREXA) IM injection 5 mg, 5 mg, Intramuscular, Q8H PRN, Sandhya Bolaños MD    OLANZapine (ZyPREXA) tablet 5 mg, 5 mg, Oral, Q8H PRN, Sandhya Bolaños MD    ondansetron (ZOFRAN-ODT) dispersible tablet 4 mg, 4 mg, Oral, Q6H PRN, Sandhya Bolaños MD, 4 mg at 12/09/19 1757    pantoprazole (PROTONIX) EC tablet 40 mg, 40 mg, Oral, Early Morning, Sandhya Bolaños MD, 40 mg at 01/11/20 0549    polyethylene glycol (MIRALAX) packet 17 g, 17 g, Oral, Daily PRN, Sandhya Bolaños MD    polyvinyl alcohol (LIQUIFILM TEARS) 1 4 % ophthalmic solution 1 drop, 1 drop, Both Eyes, Q3H PRN, Sandhya Bolaños MD    sertraline (ZOLOFT) tablet 150 mg, 150 mg, Oral, Daily, Sandhya Bolaños MD, 150 mg at 01/11/20 0836    sucralfate (CARAFATE) oral suspension 1,000 mg, 1,000 mg, Oral, BID, Cat Torres MD, 1,000 mg at 01/11/20 0602    theophylline (JEF-24) 24 hr capsule 200 mg, 200 mg, Oral, Daily, Sandhya Bolaños MD, 200 mg at 01/11/20 0836    tiotropium (SPIRIVA) capsule for inhaler 18 mcg, 18 mcg, Inhalation, Daily, Deedee Muir MD, 18 mcg at 01/11/20 0836    traZODone (DESYREL) tablet 25 mg, 25 mg, Oral, HS PRN, Deedee Muir MD    Current Problem List:    Patient Active Problem List   Diagnosis    [facility]    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    Tobacco use disorder, continuous    Bipolar disorder ([facility]    Left hip pain    Lactic acidosis    Hypokalemia    Hypomagnesemia    Compression fracture of L4 lumbar vertebra    Thoracic compression fracture (HCC)    Ventral hernia    Parapneumonic effusion    Acute on chronic respiratory failure with hypoxia (HCC)    Chronic respiratory failure (HCC)    Hypophosphatemia    Elevated MCV    [facility]oaffective disorder, bipolar type [facility]    Acquired hypothyroidism    Gastroesophageal reflux disease without esophagitis    Abnormal CT of the chest    Excessive cerumen in left ear canal    Lipoma of right upper extremity    Polydipsia    Localized swelling of both lower legs    Noncompliant with deep vein thrombosis (DVT) prophylaxis    Allergic conjunctivitis of right eye    At risk for aspiration    Ear ache       Problem list reviewed 01/11/20     Objective:     Vital Signs:  Vitals:    01/10/20 0705 01/10/20 1615 01/10/20 2005 01/11/20 0700   BP: 108/55 104/61 98/65 100/64   BP Location: Left arm Left arm Right arm Right arm   Pulse: 71 84 74 80   Resp: 19 16 20 18   Temp:  (!) 97 2 °F (36 2 °C) 98 1 °F (36 7 °C) 97 7 °F (36 5 °C)   TempSrc:  Temporal Temporal    SpO2:  95% 94% 94%   Weight:       Height:             Appearance:  age appropriate, casually dressed and disheveled   Behavior:  normal   Speech:  normal volume   Mood:  anxious   Affect:  constricted   Thought Process:  normal   Thought Content:  delusions  persecutory   Perceptual Disturbances: None   Risk Potential: none   Sensorium:  person, place and time   Cognition:  intact   Consciousness:  alert and awake    Attention: attention span and concentration were age appropriate   Intellect: average Insight:  poor   Judgment: poor      Motor Activity: no abnormal movements       I/O Past 24 hours:  No intake/output data recorded  No intake/output data recorded  Labs:  Reviewed 01/11/20    Progress Toward Goals:  unchanged    Assessment / Plan:     Schizoaffective disorder, bipolar type ([facility] )    Recommended Treatment:      Medication changes:  1) continue current treatment plan    Non-pharmacological treatments  1) Continue with group therapy, milieu therapy and occupational therapy  Safety  1) Safety/communication plan established targeting dynamic risk factors above  2) Risks, benefits, and possible side effects of medications explained to patient and patient verbalizes understanding  Counseling / Coordination of Care    Total floor / unit time spent today 20 minutes  Greater than 50% of total time was spent with the patient and / or family counseling and / or coordination of care  A description of the counseling / coordination of care  Patient's Rights, confidentiality and exceptions to confidentiality, use of automated medical record, [facility] staff access to medical record, and consent to treatment reviewed      Teja Muñoz PA-C

## 2020-01-11 NOTE — PLAN OF CARE
Problem: Alteration in Thoughts and Perception  Goal: Verbalize thoughts and feelings  Description  Interventions:  - Promote a nonjudgmental and trusting relationship with the patient through active listening and therapeutic communication  - Assess patient's level of functioning, behavior and potential for risk  - Engage patient in 1 on 1 interactions for a minimum of 15 minutes each session  - Encourage patient to express fears, feelings, frustrations, and discuss symptoms    - [place] patient to reality, help patient recognize reality-based thinking   - Administer medications as ordered and assess for potential side effects  - Provide the patient education related to the signs and symptoms of the illness and desired effects of prescribed medications  Outcome: Progressing  Goal: Agree to be compliant with medication regime, as prescribed and report medication side effects  Description  Interventions:  - Offer appropriate PRN medication and supervise ingestion; conduct aims, as needed   Outcome: Progressing  Goal: Attend and participate in unit activities, including therapeutic, recreational, and educational groups  Description  Interventions:  - Provide therapeutic and educational activities daily, encourage attendance and participation, and document same in the medical record     CERTIFIED PEER SPECIALIST INTERVENTIONS:    Complete peer assessment with patient to assess their needs and identify their goals to complete while in the recovery program as well as once discharged into the community  Patient will complete WRAP Plan, Crisis Plan and 5 Life Domains  Patient will attend 50% of groups offered on the unit  Patient will complete a goal card weekly      Outcome: Progressing     Problem: Risk for Self Injury/Neglect  Goal: Verbalize thoughts and feelings  Description  Interventions:  - Assess and re-assess patient's lethality and potential for self-injury  - Engage patient in 1:1 interactions, daily, for a minimum of 15 minutes  - Encourage patient to express feelings, fears, frustrations, hopes  - Establish rapport/trust with patient   Outcome: Progressing  Goal: Refrain from harming self  Description  Interventions:  - Monitor patient closely, per order  - Develop a trusting relationship  - Supervise medication ingestion, monitor effects and side effects   Outcome: Progressing  Goal: Attend and participate in unit activities, including therapeutic, recreational, and educational groups  Description  Interventions:  - Provide therapeutic and educational activities daily, encourage attendance and participation, and document same in the medical record  - Obtain collateral information, encourage visitation and family involvement in care   Outcome: Progressing     Problem: Alteration in Orientation  Goal: Interact with staff daily  Description  Interventions:  - Assess and re-assess patient's level of orientation  - Engage patient in 1 on 1 interactions, daily, for a minimum of 15 minutes   - Establish rapport/trust with patient   Outcome: Progressing     Problem: SAFETY ADULT  Goal: Patient will remain free of falls  Description  INTERVENTIONS:  - Assess patient frequently for physical needs  -  Identify cognitive and physical deficits and behaviors that affect risk of falls    -  [place] fall precautions as indicated by assessment   - Educate patient/family on patient safety including physical limitations  - Instruct patient to call for assistance with activity based on assessment  - Modify environment to reduce risk of injury  - Consider OT/PT consult to assist with strengthening/mobility  Outcome: Progressing     Problem: Alteration in Thoughts and Perception  Goal: Complete daily ADLs, including personal hygiene independently, as able  Description  Interventions:  - Observe, teach, and assist patient with ADLS  - Monitor and promote a balance of rest/activity, with adequate nutrition and elimination   Outcome: Not Progressing     Problem: Risk for Self Injury/Neglect  Goal: Complete daily ADLs, including personal hygiene independently, as able  Description  Interventions:  - Observe, teach, and assist patient with ADLS  - Monitor and promote a balance of rest/activity, with adequate nutrition and elimination  Outcome: Not Renrivka Taty has been more visible on the unit  She likes to sit in the dining room  Social with staff and select peers  Pleasant and cooperative  Smiling when conversing with staff/peers  Came for her medication without prompting  She has not been somatic  Ate 75% of her meal and drank an Ensure  Sat in the dining room for a while after eating  Naps at times  She did have a BM this shift  Did not shower  Disheveled appearance  Attended evening group (Did puzzle book)  Prompted for HS medication  Wants ear drops before going to bed  Ate snack  Oxygen saturation 90% prior to Oxygen 1 liter via nasal cannula being applied  Ear drops to left ear prior to going to bed  Continue to monitor  Precautions maintained

## 2020-01-11 NOTE — PLAN OF CARE
Problem: Alteration in Thoughts and Perception  Goal: Verbalize thoughts and feelings  Description  Interventions:  - Promote a nonjudgmental and trusting relationship with the patient through active listening and therapeutic communication  - Assess patient's level of functioning, behavior and potential for risk  - Engage patient in 1 on 1 interactions for a minimum of 15 minutes each session  - Encourage patient to express fears, feelings, frustrations, and discuss symptoms    - [place] patient to reality, help patient recognize reality-based thinking   - Administer medications as ordered and assess for potential side effects  - Provide the patient education related to the signs and symptoms of the illness and desired effects of prescribed medications  Outcome: Progressing  Goal: Agree to be compliant with medication regime, as prescribed and report medication side effects  Description  Interventions:  - Offer appropriate PRN medication and supervise ingestion; conduct aims, as needed   Outcome: Progressing  Goal: Attend and participate in unit activities, including therapeutic, recreational, and educational groups  Description  Interventions:  - Provide therapeutic and educational activities daily, encourage attendance and participation, and document same in the medical record     CERTIFIED PEER SPECIALIST INTERVENTIONS:    Complete peer assessment with patient to assess their needs and identify their goals to complete while in the recovery program as well as once discharged into the community  Patient will complete WRAP Plan, Crisis Plan and 5 Life Domains  Patient will attend 50% of groups offered on the unit  Patient will complete a goal card weekly      Outcome: Progressing     Problem: Ineffective Coping  Goal: Participates in unit activities  Description  Interventions:  - Provide therapeutic environment   - Provide required programming   - Redirect inappropriate behaviors   Outcome: Progressing  Goal: Patient/Family verbalizes awareness of resources  Outcome: Progressing  Goal: Understands least restrictive measures  Description  Interventions:  - Utilize least restrictive behavior  Outcome: Progressing     Problem: Risk for Self Injury/Neglect  Goal: Treatment Goal: Remain safe during length of stay, learn and adopt new coping skills, and be free of self-injurious ideation, impulses and acts at the time of discharge  Outcome: Progressing  Goal: Refrain from harming self  Description  Interventions:  - Monitor patient closely, per order  - Develop a trusting relationship  - Supervise medication ingestion, monitor effects and side effects   Outcome: Progressing  Goal: Attend and participate in unit activities, including therapeutic, recreational, and educational groups  Description  Interventions:  - Provide therapeutic and educational activities daily, encourage attendance and participation, and document same in the medical record  - Obtain collateral information, encourage visitation and family involvement in care   Outcome: Progressing     Problem: Depression  Goal: Treatment Goal: Demonstrate behavioral control of depressive symptoms, verbalize feelings of improved mood/affect, and adopt new coping skills prior to discharge  Outcome: Progressing  Goal: Verbalize thoughts and feelings  Description  Interventions:  - Assess and re-assess patient's level of risk   - Engage patient in 1:1 interactions, daily, for a minimum of 15 minutes   - Encourage patient to express feelings, fears, frustrations, hopes   Outcome: Progressing     Problem: Anxiety  Goal: Anxiety is at manageable level  Description  Interventions:  - Assess and monitor patient's anxiety level  - Monitor for signs and symptoms of anxiety both physical and emotional (heart palpitations, chest pain, shortness of breath, headaches, nausea, feeling jumpy, restlessness, irritable, apprehensive)     - Collaborate with interdisciplinary team and initiate plan and interventions as ordered  - [place] patient to unit/surroundings  - Explain treatment plan  - Encourage participation in care  - Encourage verbalization of concerns/fears  - Identify coping mechanisms  - Assist in developing anxiety-reducing skills  - Administer/offer alternative therapies  - Limit or eliminate stimulants  Outcome: Progressing     Problem: Alteration in Orientation  Goal: Interact with staff daily  Description  Interventions:  - Assess and re-assess patient's level of orientation  - Engage patient in 1 on 1 interactions, daily, for a minimum of 15 minutes   - Establish rapport/trust with patient   Outcome: Progressing     Problem: PAIN - ADULT  Goal: Verbalizes/displays adequate comfort level or baseline comfort level  Description  Interventions:  - Encourage patient to monitor pain and request assistance  - Assess pain using appropriate pain scale  - Administer analgesics based on type and severity of pain and evaluate response  - Implement non-pharmacological measures as appropriate and evaluate response  - Consider cultural and social influences on pain and pain management  - Notify physician/advanced practitioner if interventions unsuccessful or patient reports new pain  Outcome: Progressing     Problem: SAFETY ADULT  Goal: Patient will remain free of falls  Description  INTERVENTIONS:  - Assess patient frequently for physical needs  -  Identify cognitive and physical deficits and behaviors that affect risk of falls    -  [place] fall precautions as indicated by assessment   - Educate patient/family on patient safety including physical limitations  - Instruct patient to call for assistance with activity based on assessment  - Modify environment to reduce risk of injury  - Consider OT/PT consult to assist with strengthening/mobility  Outcome: Progressing     Problem: Nutrition/Hydration-ADULT  Goal: Nutrient/Hydration intake appropriate for improving, restoring or maintaining nutritional needs  Description  Monitor and assess patient's nutrition/hydration status for malnutrition  Collaborate with interdisciplinary team and initiate plan and interventions as ordered  Monitor patient's weight and dietary intake as ordered or per policy  Utilize nutrition screening tool and intervene as necessary  Determine patient's food preferences and provide high-protein, high-caloric foods as appropriate  INTERVENTIONS:  - Monitor oral intake, urinary output, labs, and treatment plans  - Assess nutrition and hydration status and recommend course of action  - Evaluate amount of meals eaten  - Assist patient with eating if necessary   - Allow adequate time for meals  - Recommend/ encourage appropriate diets, oral nutritional supplements, and vitamin/mineral supplements  - Order, calculate, and assess calorie counts as needed  - Recommend, monitor, and adjust tube feedings and TPN/PPN based on assessed needs  - Assess need for intravenous fluids  - Provide specific nutrition/hydration education as appropriate  - Include patient/family/caregiver in decisions related to nutrition  Outcome: Progressing   Mostly isolative to her bed, out for meds and meals, attended journaling and nutrition groups  No somatic complaints voiced this shift  Ate 75% of breakfast consisting of yogurt, oatmeal, and some of her eggs  Ate 75% of lunch consisting of cookies, french fries, and potato salad  No issues noted  Continue to monitor

## 2020-01-11 NOTE — PROGRESS NOTES
Sleeping at this time, no s/s of distress noted  O2 on 1L/NC  All precautions in place and maintained at this time   Monitoring continues

## 2020-01-11 NOTE — PLAN OF CARE
Problem: Alteration in Thoughts and Perception  Goal: Verbalize thoughts and feelings  Description  Interventions:  - Promote a nonjudgmental and trusting relationship with the patient through active listening and therapeutic communication  - Assess patient's level of functioning, behavior and potential for risk  - Engage patient in 1 on 1 interactions for a minimum of 15 minutes each session  - Encourage patient to express fears, feelings, frustrations, and discuss symptoms    - [place] patient to reality, help patient recognize reality-based thinking   - Administer medications as ordered and assess for potential side effects  - Provide the patient education related to the signs and symptoms of the illness and desired effects of prescribed medications  Outcome: Progressing  Goal: Agree to be compliant with medication regime, as prescribed and report medication side effects  Description  Interventions:  - Offer appropriate PRN medication and supervise ingestion; conduct aims, as needed   Outcome: Progressing  Goal: Attend and participate in unit activities, including therapeutic, recreational, and educational groups  Description  Interventions:  - Provide therapeutic and educational activities daily, encourage attendance and participation, and document same in the medical record     CERTIFIED PEER SPECIALIST INTERVENTIONS:    Complete peer assessment with patient to assess their needs and identify their goals to complete while in the recovery program as well as once discharged into the community  Patient will complete WRAP Plan, Crisis Plan and 5 Life Domains  Patient will attend 50% of groups offered on the unit  Patient will complete a goal card weekly      Outcome: Progressing     Problem: Depression  Goal: Refrain from isolation  Description  Interventions:  - Develop a trusting relationship   - Encourage socialization   Outcome: Progressing     Problem: Alteration in Thoughts and Perception  Goal: Complete daily ADLs, including personal hygiene independently, as able  Description  Interventions:  - Observe, teach, and assist patient with ADLS  - Monitor and promote a balance of rest/activity, with adequate nutrition and elimination   Outcome: Not Progressing     [address] was unwilling to shower tonight as will only accept assistance from select staff who were not working tonight  She was verbal of apprehension about the Zoloft increase, but, cautiously optimistic that suffering no ill effects from it so far  At meal ate 100% & her Ensure, had bites of pizza (too hard to chew due to missing teeth) & dessert when sister visited, yogurt for HS snack  Came up on own for scheduled medicines except the Singulair  Had a pleasurable visit w/sister  Was cooperative w/EKG  Used the [employer] reaching progressive volumes from 1000 to 1250ml  Took part in PM Game Group playing [employer] w/male peer  Wearing now her QHS humidified nasal O2 @ 1L for bed

## 2020-01-11 NOTE — PROGRESS NOTES
[age] Maggie's EKG tonight showed 'inferior infarct, age undetermined'  Dr Ubaldo Shahid was contacted to compare w/older EKG  She confirmed that patient is not symptomatic, that changes are insignificant from the last EKG & therefore no need for concern

## 2020-01-12 NOTE — PLAN OF CARE
Problem: Alteration in Thoughts and Perception  Goal: Treatment Goal: Gain control of psychotic behaviors/thinking, reduce/eliminate presenting symptoms and demonstrate improved reality functioning upon discharge  Outcome: Progressing  Goal: Verbalize thoughts and feelings  Description  Interventions:  - Promote a nonjudgmental and trusting relationship with the patient through active listening and therapeutic communication  - Assess patient's level of functioning, behavior and potential for risk  - Engage patient in 1 on 1 interactions for a minimum of 15 minutes each session  - Encourage patient to express fears, feelings, frustrations, and discuss symptoms    - [place] patient to reality, help patient recognize reality-based thinking   - Administer medications as ordered and assess for potential side effects  - Provide the patient education related to the signs and symptoms of the illness and desired effects of prescribed medications  Outcome: Progressing  Goal: Agree to be compliant with medication regime, as prescribed and report medication side effects  Description  Interventions:  - Offer appropriate PRN medication and supervise ingestion; conduct aims, as needed   Outcome: Progressing  Goal: Attend and participate in unit activities, including therapeutic, recreational, and educational groups  Description  Interventions:  - Provide therapeutic and educational activities daily, encourage attendance and participation, and document same in the medical record     CERTIFIED PEER SPECIALIST INTERVENTIONS:    Complete peer assessment with patient to assess their needs and identify their goals to complete while in the recovery program as well as once discharged into the community  Patient will complete WRAP Plan, Crisis Plan and 5 Life Domains  Patient will attend 50% of groups offered on the unit  Patient will complete a goal card weekly      Outcome: Progressing  Goal: Recognize dysfunctional thoughts, communicate reality-based thoughts at the time of discharge  Description  Interventions:  - Provide medication and psycho-education to assist patient in compliance and developing insight into his/her illness   Outcome: Progressing     Problem: Alteration in Thoughts and Perception  Goal: Complete daily ADLs, including personal hygiene independently, as able  Description  Interventions:  - Observe, teach, and assist patient with ADLS  - Monitor and promote a balance of rest/activity, with adequate nutrition and elimination   Outcome: Not Progressing     Patient alert and cooperative  Isolative to room and self  In bed napping all shift  Did not attend groups  Ate 50% of both meals  Denies depression, anxiety, si and hi  Med compliant  No somatic complaints  No c/o s/s pain or discomfort    Will continue to monitor progress in recovery program

## 2020-01-12 NOTE — PROGRESS NOTES
Sleeping, O2 on @1L, no s/s of resp distress   Safe precautions in place and maintained at this time, monitoring continues

## 2020-01-12 NOTE — PROGRESS NOTES
Psychiatry Progress Note    Subjective: Interval History     Patient isolative to her room this morning  Does report that she went to groups yesterday; staff does confirm attended 2 groups  Pt reports that her goal is to attend groups again today  Reports that she is feeling okay today; reports depression and anxiety remain the same  Denies SI and HI  Did eat her meals yesterday; compliant with medications  No somatic complaints during assessment  Hygiene remains poor; has not showered since 1/5      Behavior over the last 24 hours:  unchanged  Sleep: normal  Appetite: normal  Medication side effects: No  ROS: no complaints    Current medications:    Current Facility-Administered Medications:     acetaminophen (TYLENOL) tablet 650 mg, 650 mg, Oral, Q6H PRN, Meredith Wesley MD    albuterol (PROVENTIL HFA,VENTOLIN HFA) inhaler 2 puff, 2 puff, Inhalation, Q4H PRN, Meredith Wesley MD, 2 puff at 10/11/19 0424    aluminum-magnesium hydroxide-simethicone (MYLANTA) 200-200-20 mg/5 mL oral suspension 15 mL, 15 mL, Oral, Q4H PRN, Meredith Wesley MD    ammonium lactate (LAC-HYDRIN) 12 % lotion 1 application, 1 application, Topical, BID PRN, Meredith Wesley MD    benzonatate (TESSALON PERLES) capsule 100 mg, 100 mg, Oral, TID PRN, Meredith Wesley MD    benztropine (COGENTIN) injection 1 mg, 1 mg, Intramuscular, Q8H PRN, Meredith Wesley MD    carbamide peroxide (DEBROX) 6 5 % otic solution 5 drop, 5 drop, Left Ear, BID, Rona Conte MD, 5 drop at 01/12/20 0908    cloZAPine (CLOZARIL) tablet 25 mg, 25 mg, Oral, BID, Meredith Wesley MD, 25 mg at 01/11/20 2051    cloZAPine (CLOZARIL) tablet 50 mg, 50 mg, Oral, BID, Meredith Wesley MD, 50 mg at 01/11/20 2051    EPINEPHrine PF (ADRENALIN) 1 mg/mL injection 0 15 mg, 0 15 mg, Intramuscular, Once PRN, Meredith Wesley MD    fluticasone-vilanterol (BREO ELLIPTA) 200-25 MCG/INH inhaler 1 puff, 1 puff, Inhalation, Daily, Meredith Wesley MD, 1 puff at 01/12/20 0884    ketotifen (ZADITOR) 0 025 % ophthalmic solution 1 drop, 1 drop, Right Eye, BID PRN, Jaz Beasley MD    levothyroxine tablet 125 mcg, 125 mcg, Oral, Early Morning, Jaz Beasley MD, 125 mcg at 01/12/20 0604    magnesium hydroxide (MILK OF MAGNESIA) 400 mg/5 mL oral suspension 30 mL, 30 mL, Oral, Daily PRN, Jaz Beasley MD    montelukast (SINGULAIR) tablet 10 mg, 10 mg, Oral, HS, Jaz Beasley MD, 10 mg at 12/24/19 2109    OLANZapine (ZyPREXA) IM injection 5 mg, 5 mg, Intramuscular, Q8H PRN, Jaz Beasley MD    OLANZapine (ZyPREXA) tablet 5 mg, 5 mg, Oral, Q8H PRN, Jaz Beasley MD    ondansetron (ZOFRAN-ODT) dispersible tablet 4 mg, 4 mg, Oral, Q6H PRN, Jaz Besaley MD, 4 mg at 12/09/19 1757    pantoprazole (PROTONIX) EC tablet 40 mg, 40 mg, Oral, Early Morning, Jaz Beasley MD, 40 mg at 01/12/20 0604    polyethylene glycol (MIRALAX) packet 17 g, 17 g, Oral, Daily PRN, Jaz Beasley MD    polyvinyl alcohol (LIQUIFILM TEARS) 1 4 % ophthalmic solution 1 drop, 1 drop, Both Eyes, Q3H PRN, Jaz Beasley MD    sertraline (ZOLOFT) tablet 150 mg, 150 mg, Oral, Daily, Jaz Beasley MD, 150 mg at 01/12/20 0851    sucralfate (CARAFATE) oral suspension 1,000 mg, 1,000 mg, Oral, BID, Maggie Andrade MD, 1,000 mg at 01/12/20 0604    theophylline (JEF-24) 24 hr capsule 200 mg, 200 mg, Oral, Daily, Jaz Beasley MD, 200 mg at 01/12/20 0851    tiotropium (SPIRIVA) capsule for inhaler 18 mcg, 18 mcg, Inhalation, Daily, Jaz Beasley MD, 18 mcg at 01/12/20 0851    traZODone (DESYREL) tablet 25 mg, 25 mg, Oral, HS PRN, Jaz Beasley MD    Current Problem List:    Patient Active Problem List   Diagnosis    Sepsis ([facility] )    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    Tobacco use disorder, continuous    Bipolar disorder ([facility]    Left hip pain    Lactic acidosis    Hypokalemia    Hypomagnesemia    Compression fracture of L4 lumbar vertebra    Thoracic compression fracture (HCC)    Ventral hernia    Parapneumonic effusion    Acute on chronic respiratory failure with hypoxia (HCC)    Chronic respiratory failure (HCC)    Hypophosphatemia    Elevated MCV    Schizoaffective disorder, bipolar type (HCC)    Acquired hypothyroidism    Gastroesophageal reflux disease without esophagitis    Abnormal CT of the chest    Excessive cerumen in left ear canal    Lipoma of right upper extremity    Polydipsia    Localized swelling of both lower legs    Noncompliant with deep vein thrombosis (DVT) prophylaxis    Allergic conjunctivitis of right eye    At risk for aspiration    Ear ache       Problem list reviewed 01/12/20     Objective:     Vital Signs:  Vitals:    01/11/20 1600 01/11/20 2000 01/11/20 2134 01/12/20 0700   BP: 106/63 102/80  106/63   BP Location: Left arm Left arm  Right arm   Pulse: 83 100 99 78   Resp: 14 16  16   Temp: 97 5 °F (36 4 °C) (!) 97 4 °F (36 3 °C)  (!) 97 3 °F (36 3 °C)   TempSrc: Temporal Temporal     SpO2: 94% 94% 90% 93%   Weight:    66 3 kg (146 lb 3 2 oz)   Height:             Appearance:  age appropriate, casually dressed and disheveled   Behavior:  normal   Speech:  normal volume   Mood:  anxious   Affect:  constricted   Thought Process:  normal   Thought Content:  delusions  persecutory   Perceptual Disturbances: None   Risk Potential: none   Sensorium:  person, place and time   Cognition:  intact   Consciousness:  alert and awake    Attention: attention span and concentration were age appropriate   Intellect: average   Insight:  poor   Judgment: poor      Motor Activity: no abnormal movements       I/O Past 24 hours:  No intake/output data recorded  No intake/output data recorded  Labs:  Reviewed 01/12/20    Progress Toward Goals:  unchanged    Assessment / Plan:     [facility]    Recommended Treatment:      Medication changes:  1) continue current treatment plan    Non-pharmacological treatments  1) Continue with group therapy, milieu therapy and occupational therapy      Safety  1) Safety/communication plan established targeting dynamic risk factors above  2) Risks, benefits, and possible side effects of medications explained to patient and patient verbalizes understanding  Counseling / Coordination of Care    Total floor / unit time spent today 20 minutes  Greater than 50% of total time was spent with the patient and / or family counseling and / or coordination of care  A description of the counseling / coordination of care  Patient's Rights, confidentiality and exceptions to confidentiality, use of automated medical record, [facility] staff access to medical record, and consent to treatment reviewed      Francisco Ramos PA-C

## 2020-01-13 NOTE — PROGRESS NOTES
01/13/20 0900   Team Meeting   Meeting Type Daily Rounds   Team Members Present   Team Members Present Physician;Nurse;; Other (Discipline and Name)   Physician Team Member Dr Brisa Arana Team Member Stan Pinzon RN   Care Management Team Member Brenda Baron   Other (Discipline and Name) Damon Farr LCSW     Patient had a better appetite this weekend  Went to group on Saturday but no groups on Sunday  Slept

## 2020-01-13 NOTE — PROGRESS NOTES
Sleeping with no distress noted, all precautions in place and maintained at this time  Remains on 1L of 02  Will monitor

## 2020-01-13 NOTE — PLAN OF CARE
Problem: Alteration in Thoughts and Perception  Goal: Agree to be compliant with medication regime, as prescribed and report medication side effects  Description  Interventions:  - Offer appropriate PRN medication and supervise ingestion; conduct aims, as needed   Outcome: Progressing  Goal: Complete daily ADLs, including personal hygiene independently, as able  Description  Interventions:  - Observe, teach, and assist patient with ADLS  - Monitor and promote a balance of rest/activity, with adequate nutrition and elimination   Outcome: Progressing     Problem: Alteration in Thoughts and Perception  Goal: Attend and participate in unit activities, including therapeutic, recreational, and educational groups  Description  Interventions:  - Provide therapeutic and educational activities daily, encourage attendance and participation, and document same in the medical record     CERTIFIED PEER SPECIALIST INTERVENTIONS:    Complete peer assessment with patient to assess their needs and identify their goals to complete while in the recovery program as well as once discharged into the community  Patient will complete WRAP Plan, Crisis Plan and 5 Life Domains  Patient will attend 50% of groups offered on the unit  Patient will complete a goal card weekly  Outcome: Not Progressing     Problem: Ineffective Coping  Goal: Demonstrates healthy coping skills  Outcome: Not Progressing     Problem: Depression  Goal: Refrain from isolation  Description  Interventions:  - Develop a trusting relationship   - Encourage socialization   Outcome: Not Progressing     Pt visible for meds and meals, otherwise isolative to room and self  Pt did not attend group  Pt did shower today  No issues or somatic complaints  Compliant with meds without prompting  Will continue to monitor

## 2020-01-13 NOTE — PLAN OF CARE
Problem: Alteration in Thoughts and Perception  Goal: Attend and participate in unit activities, including therapeutic, recreational, and educational groups  Description  Interventions:  - Provide therapeutic and educational activities daily, encourage attendance and participation, and document same in the medical record     CERTIFIED PEER SPECIALIST INTERVENTIONS:    Complete peer assessment with patient to assess their needs and identify their goals to complete while in the recovery program as well as once discharged into the community  Patient will complete WRAP Plan, Crisis Plan and 5 Life Domains  Patient will attend 50% of groups offered on the unit  Patient will complete a goal card weekly  1/13/2020 1623 by Loan Camilo  Outcome: Not Progressing  1/13/2020 1309 by Loan Camilo  Outcome: Progressing  Patient did not complete Goal Card for the week of 1/13/2020

## 2020-01-13 NOTE — PLAN OF CARE
Problem: Alteration in Thoughts and Perception  Goal: Treatment Goal: Gain control of psychotic behaviors/thinking, reduce/eliminate presenting symptoms and demonstrate improved reality functioning upon discharge  Outcome: Progressing  Goal: Verbalize thoughts and feelings  Description  Interventions:  - Promote a nonjudgmental and trusting relationship with the patient through active listening and therapeutic communication  - Assess patient's level of functioning, behavior and potential for risk  - Engage patient in 1 on 1 interactions for a minimum of 15 minutes each session  - Encourage patient to express fears, feelings, frustrations, and discuss symptoms    - Coy patient to reality, help patient recognize reality-based thinking   - Administer medications as ordered and assess for potential side effects  - Provide the patient education related to the signs and symptoms of the illness and desired effects of prescribed medications  Outcome: Progressing  Goal: Agree to be compliant with medication regime, as prescribed and report medication side effects  Description  Interventions:  - Offer appropriate PRN medication and supervise ingestion; conduct aims, as needed   Outcome: Progressing  Goal: Attend and participate in unit activities, including therapeutic, recreational, and educational groups  Description  Interventions:  - Provide therapeutic and educational activities daily, encourage attendance and participation, and document same in the medical record     CERTIFIED PEER SPECIALIST INTERVENTIONS:    Complete peer assessment with patient to assess their needs and identify their goals to complete while in the recovery program as well as once discharged into the community  Patient will complete WRAP Plan, Crisis Plan and 5 Life Domains  Patient will attend 50% of groups offered on the unit  Patient will complete a goal card weekly      Outcome: Progressing     Problem: Ineffective Coping  Goal: Participates in unit activities  Description  Interventions:  - Provide therapeutic environment   - Provide required programming   - Redirect inappropriate behaviors   Outcome: Progressing  Goal: Patient/Family verbalizes awareness of resources  Outcome: Progressing  Goal: Understands least restrictive measures  Description  Interventions:  - Utilize least restrictive behavior  Outcome: Progressing     Problem: Risk for Self Injury/Neglect  Goal: Treatment Goal: Remain safe during length of stay, learn and adopt new coping skills, and be free of self-injurious ideation, impulses and acts at the time of discharge  Outcome: Progressing  Goal: Refrain from harming self  Description  Interventions:  - Monitor patient closely, per order  - Develop a trusting relationship  - Supervise medication ingestion, monitor effects and side effects   Outcome: Progressing  Goal: Attend and participate in unit activities, including therapeutic, recreational, and educational groups  Description  Interventions:  - Provide therapeutic and educational activities daily, encourage attendance and participation, and document same in the medical record  - Obtain collateral information, encourage visitation and family involvement in care   Outcome: Progressing     Problem: PAIN - ADULT  Goal: Verbalizes/displays adequate comfort level or baseline comfort level  Description  Interventions:  - Encourage patient to monitor pain and request assistance  - Assess pain using appropriate pain scale  - Administer analgesics based on type and severity of pain and evaluate response  - Implement non-pharmacological measures as appropriate and evaluate response  - Consider cultural and social influences on pain and pain management  - Notify physician/advanced practitioner if interventions unsuccessful or patient reports new pain  Outcome: Progressing     Problem: SAFETY ADULT  Goal: Patient will remain free of falls  Description  INTERVENTIONS:  - Assess patient frequently for physical needs  -  Identify cognitive and physical deficits and behaviors that affect risk of falls  -  [place] fall precautions as indicated by assessment   - Educate patient/family on patient safety including physical limitations  - Instruct patient to call for assistance with activity based on assessment  - Modify environment to reduce risk of injury  - Consider OT/PT consult to assist with strengthening/mobility  Outcome: Progressing     Problem: Nutrition/Hydration-ADULT  Goal: Nutrient/Hydration intake appropriate for improving, restoring or maintaining nutritional needs  Description  Monitor and assess patient's nutrition/hydration status for malnutrition  Collaborate with interdisciplinary team and initiate plan and interventions as ordered  Monitor patient's weight and dietary intake as ordered or per policy  Utilize nutrition screening tool and intervene as necessary  Determine patient's food preferences and provide high-protein, high-caloric foods as appropriate  INTERVENTIONS:  - Monitor oral intake, urinary output, labs, and treatment plans  - Assess nutrition and hydration status and recommend course of action  - Evaluate amount of meals eaten  - Assist patient with eating if necessary   - Allow adequate time for meals  - Recommend/ encourage appropriate diets, oral nutritional supplements, and vitamin/mineral supplements  - Order, calculate, and assess calorie counts as needed  - Recommend, monitor, and adjust tube feedings and TPN/PPN based on assessed needs  - Assess need for intravenous fluids  - Provide specific nutrition/hydration education as appropriate  - Include patient/family/caregiver in decisions related to nutrition  Outcome: Progressing   Mostly isolative to her bed, out for meds and meals, attended IMR group  No somatic complaints voiced this shift  Ate 100% of breakfast and 25% of lunch  No issues noted  Continue to monitor

## 2020-01-13 NOTE — PROGRESS NOTES
Progress Note - Marguerite Angel 1955, 60 y o  female MRN: [id number]    Unit/Bed#: [facility] 110-02 Encounter: [id number]    Primary Care Provider: Stacy King PA-C   Date and time admitted to hospital: 7/23/2019  5:30 PM        * Schizoaffective disorder, bipolar type [facility])  Assessment & Plan  Psychiatry Progress Note  Patient again has not taken any showers in several days until yesterday when did take a shower after about 6 days  Her grooming is better today clean clothes uncombed her     She claims that she is making it a point to attend more groups and is now happy that she is taken off the waiting list for [facility]  She is not admitting to any thoughts about having cancer or dying from terrible illnesses nor does she talk about having an implant 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  She claims staff is no longer tampering with her spirometer and inhaler or with her medications lately  She still has a tendency to complain about swallowing difficulties and breathing difficulty and some other psychosomatic symptoms off and on and has  been eating most of her meals late  I did remind her again  that she needs to keep up with her hygiene like taking showers twice a week and attending 50% of groups to be able to return back to the [facility] for which CM has now made a referral    She has been accepting the increase in Zoloft so far    Repeat EKG was appreciated with no significant changes from before   Current medications:    Current Facility-Administered Medications:     acetaminophen (TYLENOL) tablet 650 mg, 650 mg, Oral, Q6H PRN, Jaz Beasley MD    albuterol (PROVENTIL HFA,VENTOLIN HFA) inhaler 2 puff, 2 puff, Inhalation, Q4H PRN, Jaz Beasley MD, 2 puff at 10/11/19 0424    aluminum-magnesium hydroxide-simethicone (MYLANTA) 200-200-20 mg/5 mL oral suspension 15 mL, 15 mL, Oral, Q4H PRN, Jaz Beasley MD    ammonium lactate (LAC-HYDRIN) 12 % lotion 1 application, 1 application, Topical, BID PRN, Felisa Florence MD    benzonatate (TESSALON PERLES) capsule 100 mg, 100 mg, Oral, TID PRN, Felisa Florence MD    benztropine (COGENTIN) injection 1 mg, 1 mg, Intramuscular, Q8H PRN, Felisa Florence MD    carbamide peroxide (DEBROX) 6 5 % otic solution 5 drop, 5 drop, Left Ear, BID, Rona Davis MD, 5 drop at 01/12/20 0908    cloZAPine (CLOZARIL) tablet 25 mg, 25 mg, Oral, BID, Felisa Florence MD, 25 mg at 01/12/20 2049    cloZAPine (CLOZARIL) tablet 50 mg, 50 mg, Oral, BID, Felisa Florence MD, 50 mg at 01/12/20 2050    EPINEPHrine PF (ADRENALIN) 1 mg/mL injection 0 15 mg, 0 15 mg, Intramuscular, Once PRN, Felisa Florence MD    fluticasone-vilanterol (BREO ELLIPTA) 200-25 MCG/INH inhaler 1 puff, 1 puff, Inhalation, Daily, Felisa Florence MD, 1 puff at 01/12/20 0851    ketotifen (ZADITOR) 0 025 % ophthalmic solution 1 drop, 1 drop, Right Eye, BID PRN, Felisa Florence MD    levothyroxine tablet 125 mcg, 125 mcg, Oral, Early Morning, Felisa Florence MD, 125 mcg at 01/13/20 0601    magnesium hydroxide (MILK OF MAGNESIA) 400 mg/5 mL oral suspension 30 mL, 30 mL, Oral, Daily PRN, Felisa Florence MD    montelukast (SINGULAIR) tablet 10 mg, 10 mg, Oral, HS, Felisa Florence MD, 10 mg at 12/24/19 2109    OLANZapine (ZyPREXA) IM injection 5 mg, 5 mg, Intramuscular, Q8H PRN, Felisa Florence MD    OLANZapine (ZyPREXA) tablet 5 mg, 5 mg, Oral, Q8H PRN, Felisa Florence MD    ondansetron (ZOFRAN-ODT) dispersible tablet 4 mg, 4 mg, Oral, Q6H PRN, Felisa Florence MD, 4 mg at 12/09/19 1757    pantoprazole (PROTONIX) EC tablet 40 mg, 40 mg, Oral, Early Morning, Felisa Florence MD, 40 mg at 01/13/20 0601    polyethylene glycol (MIRALAX) packet 17 g, 17 g, Oral, Daily PRN, Felisa Florence MD    polyvinyl alcohol (LIQUIFILM TEARS) 1 4 % ophthalmic solution 1 drop, 1 drop, Both Eyes, Q3H PRN, Felisa Florence MD    sertraline (ZOLOFT) tablet 150 mg, 150 mg, Oral, Daily, Felisa Florence MD, 150 mg at 01/12/20 0851    sucralfate (CARAFATE) oral suspension 1,000 mg, 1,000 mg, Oral, BID, Cat Torres MD, 1,000 mg at 01/13/20 0601    theophylline (JEF-24) 24 hr capsule 200 mg, 200 mg, Oral, Daily, Shaggy Rangel MD, 200 mg at 01/12/20 0851    tiotropium (SPIRIVA) capsule for inhaler 18 mcg, 18 mcg, Inhalation, Daily, Shaggy Rangel MD, 18 mcg at 01/12/20 0851    traZODone (DESYREL) tablet 25 mg, 25 mg, Oral, HS PRN, Shaggy Rangel MD  Justification if on more than two antipsychotics:  Only on his clozapine  Side effects if any:  None    Risks , benefits, side effects and precautions of medications discussed with patient and reminded patient to let us know any problems with medications     Objective:     Vital Signs:  Vitals:    01/11/20 2134 01/12/20 0700 01/12/20 1637 01/12/20 1941   BP:  106/63 105/65 96/52   BP Location:  Right arm Left arm Left arm   Pulse: 99 78 90 98   Resp:  16 16 16   Temp:  (!) 97 3 °F (36 3 °C) (!) 97 1 °F (36 2 °C) (!) 97 °F (36 1 °C)   TempSrc:   Temporal Temporal   SpO2: 90% 93% 94% 91%   Weight:  66 3 kg (146 lb 3 2 oz)     Height:         Appearance:  age appropriate, casually dressed and overweight older than stated age, appears poorly groomed with uncombed hair   Behavior:  evasive and guarded with no psychosomatic complaints, friendly but argumentative at times    Speech:  normal pitch and normal volume but circumstantial and tangential with stilted speech off and on   Mood:  anxious and dysthymic   Affect:  mood-congruent, elated and entitled anxious and irritated and sad at times   Thought Process:  goal directed and illogical slightly pressured and tangential and talks as if in a court of law   Thought Content:  No current suicidal homicidal thoughts intent or plans reported  No overt delusions elicited today    However she still has occasional psychosomatic symptoms like having breathing difficulty and swallowing difficulty and still upset with the fact that she is no longer eligible for portable oxygen and states she will try to go off the unit again with staff  Not voicing any suspicion about staff tampering with her inhalant or spirometer   Perceptual Disturbances: None and does not appear responding to internal stimuli    Risk Potential: Tendency to not care for herself    Sensorium:  person, place, time/date, situation, day of week, month of year and time   Cognition:  grossly intact with no deficits in recent or remote memory or immediate recall   Consciousness:  alert and awake    Attention: Intact concentration and attention span   Intellect: Considered to be at least of average intelligence   Insight:  limited but improving   Judgment: Improving slowly      Motor Activity: no abnormal movements         Recent Labs:  Results Reviewed     None          I/O Past 24 hours:  No intake/output data recorded  No intake/output data recorded  Assessment / Plan:     Schizoaffective disorder, bipolar type ([facility] [address] )      Reason for continued inpatient care:  Because of underlying paranoia and inability to care for herself on her own and multiple somatic complaints  Acceptance by patient:  Accepting  Jordy Lee in recovery:  About living in same personal care home at the [place] once she feels better  Understanding of medications :  Has some understanding  Involved in reintegration process: On off unit privileges now  Trusting in relatoinship with psychiatrist:  Trusting    Recommended Treatment:    Medication changes:  1) continue medications and tolerating the increase in Zoloft as 200 mg a day for maximum benefit   Non-pharmacological treatments  1) continue with  individual therapy group therapy, milieu therapy and occupational therapy and milieu therapy involving multidisciplinary team approach with psychotherapist, case management, nursing, peer support services, art therapist etc using recovery principles and psycho-education about accepting illness and need for treatment     2) encourage to cooperate with behavior plan  3) encourage to attend to ADLs like taking showers and wearing clean clothes   4) Encourage to attend groups   5) see how she does on off unit privileges and encourage to go off unit with staff escort  and expectations discussed with her and she did verbalize an understanding, respiratory has decided she does not need any  portable oxygen   Safety  1) Safety/communication plan established targeting dynamic risk factors above  Discharge Plan back to the [facility] with act services and withdraw the [facility] referral due to improvement made so far  Counseling / Coordination of Care    Total floor / unit time spent today 15 minutes  Greater than 50% of total time was spent with the patient and / or family counseling and / or coordination of care  A description of the counseling / coordination of care  Patient's Rights, confidentiality and exceptions to confidentiality, use of automated medical record, [facility] staff access to medical record, and consent to treatment reviewed      Faheem Daniels MD

## 2020-01-13 NOTE — PROGRESS NOTES
Patient able to identify the importance of groups,socialization and the development of coping skills to nascimento depression and help her stay out of bed       01/13/20 1100   Activity/Group Checklist   Group   (IMR/Getting the Most from Groups/The Art of Self-Disclosure )   Attendance Attended   Attendance Duration (min) 46-60   Interactions Interacted appropriately   Affect/Mood Appropriate;Bright;Normal range   Goals Achieved Identified feelings; Identified relapse prevention strategies; Discussed coping strategies; Able to listen to others; Able to engage in interactions; Able to self-disclose; Able to reflect/comment on own behavior

## 2020-01-13 NOTE — ASSESSMENT & PLAN NOTE
Psychiatry Progress Note  Patient again has not taken any showers in several days until yesterday when did take a shower after about 6 days  Her grooming is better today clean clothes uncombed her     She claims that she is making it a point to attend more groups and is now happy that she is taken off the waiting list for [facility]  She is not admitting to any thoughts about having cancer or dying from terrible illnesses nor does she talk about having an implant 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  She claims staff is no longer tampering with her spirometer and inhaler or with her medications lately  She still has a tendency to complain about swallowing difficulties and breathing difficulty and some other psychosomatic symptoms off and on and has  been eating most of her meals late  I did remind her again  that she needs to keep up with her hygiene like taking showers twice a week and attending 50% of groups to be able to return back to the [facility] for which CM has now made a referral    She has been accepting the increase in Zoloft so far    Repeat EKG was appreciated with no significant changes from before   Current medications:    Current Facility-Administered Medications:     acetaminophen (TYLENOL) tablet 650 mg, 650 mg, Oral, Q6H PRN, Faheem Daniels MD    albuterol (PROVENTIL HFA,VENTOLIN HFA) inhaler 2 puff, 2 puff, Inhalation, Q4H PRN, Faheem Daniels MD, 2 puff at 10/11/19 0424    aluminum-magnesium hydroxide-simethicone (MYLANTA) 200-200-20 mg/5 mL oral suspension 15 mL, 15 mL, Oral, Q4H PRN, Faheem Daniels MD    ammonium lactate (LAC-HYDRIN) 12 % lotion 1 application, 1 application, Topical, BID PRN, Faheem Daniels MD    benzonatate (TESSALON PERLES) capsule 100 mg, 100 mg, Oral, TID PRN, Faheem Daniels MD    benztropine (COGENTIN) injection 1 mg, 1 mg, Intramuscular, Q8H PRN, Faheem Daniels MD    carbamide peroxide (DEBROX) 6 5 % otic solution 5 drop, 5 drop, Left Ear, BID, Rona Pacheco MD Olivier, 5 drop at 01/12/20 0908    cloZAPine (CLOZARIL) tablet 25 mg, 25 mg, Oral, BID, Jerome Lopez MD, 25 mg at 01/12/20 2049    cloZAPine (CLOZARIL) tablet 50 mg, 50 mg, Oral, BID, Jerome Lopez MD, 50 mg at 01/12/20 2050    EPINEPHrine PF (ADRENALIN) 1 mg/mL injection 0 15 mg, 0 15 mg, Intramuscular, Once PRN, Jerome Lopez MD    fluticasone-vilanterol (BREO ELLIPTA) 200-25 MCG/INH inhaler 1 puff, 1 puff, Inhalation, Daily, Jerome Lopez MD, 1 puff at 01/12/20 0851    ketotifen (ZADITOR) 0 025 % ophthalmic solution 1 drop, 1 drop, Right Eye, BID PRN, Jerome Lopez MD    levothyroxine tablet 125 mcg, 125 mcg, Oral, Early Morning, Jerome Lopez MD, 125 mcg at 01/13/20 0601    magnesium hydroxide (MILK OF MAGNESIA) 400 mg/5 mL oral suspension 30 mL, 30 mL, Oral, Daily PRN, Jerome Lopez MD    montelukast (SINGULAIR) tablet 10 mg, 10 mg, Oral, HS, Jerome Lopez MD, 10 mg at 12/24/19 2109    OLANZapine (ZyPREXA) IM injection 5 mg, 5 mg, Intramuscular, Q8H PRN, Jerome Lopez MD    OLANZapine (ZyPREXA) tablet 5 mg, 5 mg, Oral, Q8H PRN, Jerome Lopez MD    ondansetron (ZOFRAN-ODT) dispersible tablet 4 mg, 4 mg, Oral, Q6H PRN, Jerome Lopez MD, 4 mg at 12/09/19 1757    pantoprazole (PROTONIX) EC tablet 40 mg, 40 mg, Oral, Early Morning, Jerome Lopez MD, 40 mg at 01/13/20 0601    polyethylene glycol (MIRALAX) packet 17 g, 17 g, Oral, Daily PRN, Jerome Lopez MD    polyvinyl alcohol (LIQUIFILM TEARS) 1 4 % ophthalmic solution 1 drop, 1 drop, Both Eyes, Q3H PRN, Jerome Lopez MD    sertraline (ZOLOFT) tablet 150 mg, 150 mg, Oral, Daily, Jerome Lopez MD, 150 mg at 01/12/20 0851    sucralfate (CARAFATE) oral suspension 1,000 mg, 1,000 mg, Oral, BID, Cat Torres MD, 1,000 mg at 01/13/20 0601    theophylline (JEF-24) 24 hr capsule 200 mg, 200 mg, Oral, Daily, Jerome Lopez MD, 200 mg at 01/12/20 0851    tiotropium (SPIRIVA) capsule for inhaler 18 mcg, 18 mcg, Inhalation, Daily, Jerome Lopez MD, 18 mcg at 01/12/20 2823    traZODone (DESYREL) tablet 25 mg, 25 mg, Oral, HS PRN, Alirio Frazier MD  Justification if on more than two antipsychotics:  Only on his clozapine  Side effects if any:  None    Risks , benefits, side effects and precautions of medications discussed with patient and reminded patient to let us know any problems with medications     Objective:     Vital Signs:  Vitals:    01/11/20 2134 01/12/20 0700 01/12/20 1637 01/12/20 1941   BP:  106/63 105/65 96/52   BP Location:  Right arm Left arm Left arm   Pulse: 99 78 90 98   Resp:  16 16 16   Temp:  (!) 97 3 °F (36 3 °C) (!) 97 1 °F (36 2 °C) (!) 97 °F (36 1 °C)   TempSrc:   Temporal Temporal   SpO2: 90% 93% 94% 91%   Weight:  66 3 kg (146 lb 3 2 oz)     Height:         Appearance:  age appropriate, casually dressed and overweight older than stated age, appears poorly groomed with uncombed hair   Behavior:  evasive and guarded with no psychosomatic complaints, friendly but argumentative at times    Speech:  normal pitch and normal volume but circumstantial and tangential with stilted speech off and on   Mood:  anxious and dysthymic   Affect:  mood-congruent, elated and entitled anxious and irritated and sad at times   Thought Process:  goal directed and illogical slightly pressured and tangential and talks as if in a court of law   Thought Content:  No current suicidal homicidal thoughts intent or plans reported  No overt delusions elicited today  However she still has occasional psychosomatic symptoms like having breathing difficulty and swallowing difficulty and still upset with the fact that she is no longer eligible for portable oxygen and states she will try to go off the unit again with staff    Not voicing any suspicion about staff tampering with her inhalant or spirometer   Perceptual Disturbances: None and does not appear responding to internal stimuli    Risk Potential: Tendency to not care for herself    Sensorium:  person, place, time/date, situation, day of week, month of year and time   Cognition:  grossly intact with no deficits in recent or remote memory or immediate recall   Consciousness:  alert and awake    Attention: Intact concentration and attention span   Intellect: Considered to be at least of average intelligence   Insight:  limited but improving   Judgment: Improving slowly      Motor Activity: no abnormal movements         Recent Labs:  Results Reviewed     None          I/O Past 24 hours:  No intake/output data recorded  No intake/output data recorded  Assessment / Plan:     Schizoaffective disorder, bipolar type ([facility] [address] )      Reason for continued inpatient care:  Because of underlying paranoia and inability to care for herself on her own and multiple somatic complaints  Acceptance by patient:  Accepting  Clara Saenz in recovery:  About living in same personal care home at the [place] once she feels better  Understanding of medications :  Has some understanding  Involved in reintegration process: On off unit privileges now  Trusting in relatoinship with psychiatrist:  Trusting    Recommended Treatment:    Medication changes:  1) continue medications and tolerating the increase in Zoloft as 200 mg a day for maximum benefit   Non-pharmacological treatments  1) continue with  individual therapy group therapy, milieu therapy and occupational therapy and milieu therapy involving multidisciplinary team approach with psychotherapist, case management, nursing, peer support services, art therapist etc using recovery principles and psycho-education about accepting illness and need for treatment     2) encourage to cooperate with behavior plan  3) encourage to attend to ADLs like taking showers and wearing clean clothes   4) Encourage to attend groups   5) see how she does on off unit privileges and encourage to go off unit with staff escort  and expectations discussed with her and she did verbalize an understanding, respiratory has decided she does not need any  portable oxygen   Safety  1) Safety/communication plan established targeting dynamic risk factors above  Discharge Plan back to the [facility] with act services and withdraw the [facility] FAC[facility] referral due to improvement made so far  Counseling / Coordination of Care    Total floor / unit time spent today 15 minutes  Greater than 50% of total time was spent with the patient and / or family counseling and / or coordination of care  A description of the counseling / coordination of care  Patient's Rights, confidentiality and exceptions to confidentiality, use of automated medical record, [facility] staff access to medical record, and consent to treatment reviewed      Krystyna Mcclain MD

## 2020-01-13 NOTE — PLAN OF CARE
Problem: Alteration in Thoughts and Perception  Goal: Attend and participate in unit activities, including therapeutic, recreational, and educational groups  Description  Interventions:  - Provide therapeutic and educational activities daily, encourage attendance and participation, and document same in the medical record     CERTIFIED PEER SPECIALIST INTERVENTIONS:    Complete peer assessment with patient to assess their needs and identify their goals to complete while in the recovery program as well as once discharged into the community  Patient will complete WRAP Plan, Crisis Plan and [facility] Life Domains  Patient will attend 50% of groups offered on the unit  Patient will complete a goal card weekly  Outcome: Progressing     Problem: Ineffective Coping  Goal: Participates in unit activities  Description  Interventions:  - Provide therapeutic environment   - Provide required programming   - Redirect inappropriate behaviors   Outcome: Progressing  Patient continues to improve group attendance and is currently at 83%  Patient attending all IMR groups now and participates considerably  Patient gaining insight to her depression and the need to do something different which includes staying out of bed  Patient insightful in groups and is able to share experiences honestly and openly  Leafy Mess continues to encourage consistency in applying the skills she is learning to enhance her personal recovery

## 2020-01-13 NOTE — PLAN OF CARE
Problem: Alteration in Thoughts and Perception  Goal: Treatment Goal: Gain control of psychotic behaviors/thinking, reduce/eliminate presenting symptoms and demonstrate improved reality functioning upon discharge  Outcome: Progressing  Goal: Verbalize thoughts and feelings  Description  Interventions:  - Promote a nonjudgmental and trusting relationship with the patient through active listening and therapeutic communication  - Assess patient's level of functioning, behavior and potential for risk  - Engage patient in 1 on 1 interactions for a minimum of 15 minutes each session  - Encourage patient to express fears, feelings, frustrations, and discuss symptoms    - [place] patient to reality, help patient recognize reality-based thinking   - Administer medications as ordered and assess for potential side effects  - Provide the patient education related to the signs and symptoms of the illness and desired effects of prescribed medications  Outcome: Progressing  Goal: Agree to be compliant with medication regime, as prescribed and report medication side effects  Description  Interventions:  - Offer appropriate PRN medication and supervise ingestion; conduct aims, as needed   Outcome: Progressing  Goal: Attend and participate in unit activities, including therapeutic, recreational, and educational groups  Description  Interventions:  - Provide therapeutic and educational activities daily, encourage attendance and participation, and document same in the medical record     CERTIFIED PEER SPECIALIST INTERVENTIONS:    Complete peer assessment with patient to assess their needs and identify their goals to complete while in the recovery program as well as once discharged into the community  Patient will complete WRAP Plan, Crisis Plan and 5 Life Domains  Patient will attend 50% of groups offered on the unit  Patient will complete a goal card weekly      Outcome: Progressing  Goal: Recognize dysfunctional thoughts, communicate reality-based thoughts at the time of discharge  Description  Interventions:  - Provide medication and psycho-education to assist patient in compliance and developing insight into his/her illness   Outcome: Progressing  Goal: Complete daily ADLs, including personal hygiene independently, as able  Description  Interventions:  - Observe, teach, and assist patient with ADLS  - Monitor and promote a balance of rest/activity, with adequate nutrition and elimination   Outcome: Progressing     Problem: Ineffective Coping  Goal: Identifies ineffective coping skills  Outcome: Progressing  Goal: Identifies healthy coping skills  Outcome: Progressing  Goal: Demonstrates healthy coping skills  Outcome: Progressing  Goal: Participates in unit activities  Description  Interventions:  - Provide therapeutic environment   - Provide required programming   - Redirect inappropriate behaviors   Outcome: Progressing  Goal: Patient/Family participate in treatment and DC plans  Description  Interventions:  - Provide therapeutic environment  Outcome: Progressing  Goal: Patient/Family verbalizes awareness of resources  Outcome: Progressing  Goal: Understands least restrictive measures  Description  Interventions:  - Utilize least restrictive behavior  Outcome: Progressing     Problem: Risk for Self Injury/Neglect  Goal: Treatment Goal: Remain safe during length of stay, learn and adopt new coping skills, and be free of self-injurious ideation, impulses and acts at the time of discharge  Outcome: Progressing  Goal: Verbalize thoughts and feelings  Description  Interventions:  - Assess and re-assess patient's lethality and potential for self-injury  - Engage patient in 1:1 interactions, daily, for a minimum of 15 minutes  - Encourage patient to express feelings, fears, frustrations, hopes  - Establish rapport/trust with patient   Outcome: Progressing  Goal: Refrain from harming self  Description  Interventions:  - Monitor patient closely, per order  - Develop a trusting relationship  - Supervise medication ingestion, monitor effects and side effects   Outcome: Progressing  Goal: Attend and participate in unit activities, including therapeutic, recreational, and educational groups  Description  Interventions:  - Provide therapeutic and educational activities daily, encourage attendance and participation, and document same in the medical record  - Obtain collateral information, encourage visitation and family involvement in care   Outcome: Progressing  Goal: Recognize maladaptive responses and adopt new coping mechanisms  Outcome: Progressing  Goal: Complete daily ADLs, including personal hygiene independently, as able  Description  Interventions:  - Observe, teach, and assist patient with ADLS  - Monitor and promote a balance of rest/activity, with adequate nutrition and elimination  Outcome: Progressing     Problem: Depression  Goal: Treatment Goal: Demonstrate behavioral control of depressive symptoms, verbalize feelings of improved mood/affect, and adopt new coping skills prior to discharge  Outcome: Progressing  Goal: Verbalize thoughts and feelings  Description  Interventions:  - Assess and re-assess patient's level of risk   - Engage patient in 1:1 interactions, daily, for a minimum of 15 minutes   - Encourage patient to express feelings, fears, frustrations, hopes   Outcome: Progressing  Goal: Refrain from isolation  Description  Interventions:  - Develop a trusting relationship   - Encourage socialization   Outcome: Progressing  Goal: Refrain from self-neglect  Outcome: Progressing     Problem: Anxiety  Goal: Anxiety is at manageable level  Description  Interventions:  - Assess and monitor patient's anxiety level  - Monitor for signs and symptoms of anxiety both physical and emotional (heart palpitations, chest pain, shortness of breath, headaches, nausea, feeling jumpy, restlessness, irritable, apprehensive)     - Collaborate with interdisciplinary team and initiate plan and interventions as ordered  - [place] patient to unit/surroundings  - Explain treatment plan  - Encourage participation in care  - Encourage verbalization of concerns/fears  - Identify coping mechanisms  - Assist in developing anxiety-reducing skills  - Administer/offer alternative therapies  - Limit or eliminate stimulants  Outcome: Progressing     Problem: Alteration in Orientation  Goal: Interact with staff daily  Description  Interventions:  - Assess and re-assess patient's level of orientation  - Engage patient in 1 on 1 interactions, daily, for a minimum of 15 minutes   - Establish rapport/trust with patient   Outcome: Progressing  Goal: Cooperate with recommended testing/procedures  Description  Interventions:  - Determine need for ancillary testing  - Observe for mental status changes  - Implement falls/precaution protocol   Outcome: Progressing     Problem: PAIN - ADULT  Goal: Verbalizes/displays adequate comfort level or baseline comfort level  Description  Interventions:  - Encourage patient to monitor pain and request assistance  - Assess pain using appropriate pain scale  - Administer analgesics based on type and severity of pain and evaluate response  - Implement non-pharmacological measures as appropriate and evaluate response  - Consider cultural and social influences on pain and pain management  - Notify physician/advanced practitioner if interventions unsuccessful or patient reports new pain  Outcome: Progressing     Problem: SAFETY ADULT  Goal: Patient will remain free of falls  Description  INTERVENTIONS:  - Assess patient frequently for physical needs  -  Identify cognitive and physical deficits and behaviors that affect risk of falls    -  [place] fall precautions as indicated by assessment   - Educate patient/family on patient safety including physical limitations  - Instruct patient to call for assistance with activity based on assessment  - Modify environment to reduce risk of injury  - Consider OT/PT consult to assist with strengthening/mobility  Outcome: Progressing     Problem: RESPIRATORY - ADULT  Goal: Achieves optimal ventilation and oxygenation  Description  INTERVENTIONS:  - Assess for changes in respiratory status  - Assess for changes in mentation and behavior  - Position to facilitate oxygenation and minimize respiratory effort  - Oxygen administration by appropriate delivery method based on oxygen saturation (per order) or ABGs  - Initiate smoking cessation education as indicated  - Encourage broncho-pulmonary hygiene including cough, deep breathe, Incentive Spirometry  - Assess the need for suctioning and aspirate as needed  - Assess and instruct to report SOB or any respiratory difficulty  - Respiratory Therapy support as indicated  Outcome: Progressing     Problem: DISCHARGE PLANNING  Goal: Discharge to home or other facility with appropriate resources  Description  INTERVENTIONS:  - Conduct assessment to determine patient/family and health care team treatment goals, and need for post-acute services based on payer coverage, community resources, and patient preferences, and barriers to discharge  - Address psychosocial, clinical, and financial barriers to discharge as identified in assessment in conjunction with the patient/family and health care team  - Assist the patient in reintegration back into the community by removing barriers which may hinder a successful discharge once deemed stable  - Arrange appropriate level of post-acute services according to patient's needs and preference and payer coverage in collaboration with the physician and health care team  - Communicate with and update the patient/family, physician, and health care team regarding progress on the discharge plan  - Arrange appropriate transportation to post-acute venues    Outcome: Progressing     Problem: SLEEP DISTURBANCE  Goal: Will exhibit normal sleeping pattern  Description  Interventions:  -  Assess the patients sleep pattern, noting recent changes  - Administer medication as ordered  - Decrease environmental stimuli, including noise, as appropriate during the night  - Encourage the patient to actively participate in unit groups and or exercise during the day to enhance ability to achieve adequate sleep at night  - Assess the patient, in the morning, encouraging a description of sleep experience  Outcome: Progressing     Problem: Nutrition/Hydration-ADULT  Goal: Nutrient/Hydration intake appropriate for improving, restoring or maintaining nutritional needs  Description  Monitor and assess patient's nutrition/hydration status for malnutrition  Collaborate with interdisciplinary team and initiate plan and interventions as ordered  Monitor patient's weight and dietary intake as ordered or per policy  Utilize nutrition screening tool and intervene as necessary  Determine patient's food preferences and provide high-protein, high-caloric foods as appropriate       INTERVENTIONS:  - Monitor oral intake, urinary output, labs, and treatment plans  - Assess nutrition and hydration status and recommend course of action  - Evaluate amount of meals eaten  - Assist patient with eating if necessary   - Allow adequate time for meals  - Recommend/ encourage appropriate diets, oral nutritional supplements, and vitamin/mineral supplements  - Order, calculate, and assess calorie counts as needed  - Recommend, monitor, and adjust tube feedings and TPN/PPN based on assessed needs  - Assess need for intravenous fluids  - Provide specific nutrition/hydration education as appropriate  - Include patient/family/caregiver in decisions related to nutrition  Outcome: Progressing

## 2020-01-13 NOTE — PROGRESS NOTES
01/10/20 1100   Activity/Group Checklist   Group Other (Comment)  (IMR - Weekly Goal Review)   Attendance Did not attend     Patient declined to attend group this morning, despite personal prompt and encouraged to attend  Patient stayed in bed throughout group

## 2020-01-13 NOTE — NURSING NOTE
Patient was compliant with all medications except for her Debrox gtts preferring to get them at night  Patient was visible on the unit and discussed her issues freely with this nurse  Patient reports mild depression 5/10 and anxiety 2/4 with no requests for PRN medications  Patient did voice concern that she ordered egg salad for dinner and could not remember if she did  Patient only wants soft food due to poor dentition  Patient did eat 100% of dinner and no further concerns  No complaints of pain or discomfort  No respiratory concerns noted  Patient was visible on the unit for a short period of time and then retreated to her room  No new issues noted at this time

## 2020-01-14 NOTE — PROGRESS NOTES
01/14/20 1100   Activity/Group Checklist   Group   (IMR/Motivation to Change)   Attendance Attended   Attendance Duration (min) 46-60   Interactions Interacted appropriately   Affect/Mood Appropriate;Normal range   Goals Achieved Identified feelings; Able to listen to others; Able to engage in interactions; Able to reflect/comment on own behavior;Able to self-disclose

## 2020-01-14 NOTE — PROGRESS NOTES
Progress Note - Efraín Sizer 1955, 60 y o  female MRN: [id number]    [facility] 110-02 Encounter: [id number]    Primary Care Provider: Peewee Palafox PA-C   Date and time admitted to hospital: 7/23/2019  5:30 PM        * Schizoaffective disorder, bipolar type [facility])  Assessment & Plan  Psychiatry Progress Note  Patient is happy to announce she took a shower two days ago  Her grooming is better today and is wearing clean clothes but with uncombed hair  She claims that she is making it a point to attend more groups and did attend 83% last week  She plans to go to the community next week to [facility] with staff escort  She is now happy that she is taken off the waiting list for [facility]   She is not admitting to any thoughts about having cancer or dying from terrible illnesses nor does she talk about having an implant 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  She claims staff is no longer tampering with her spirometer and inhaler or with her medications  She still has a tendency to complain about swallowing difficulties and breathing difficulty and some other psychosomatic symptoms some times, but  been eating most of her meals  I did remind her again  that she needs to keep up with her hygiene like taking showers twice a week and attending 50% of groups to be able to return back to the [facility] for which CM has now made a referral and she is happy about that  She has been accepting the increase in Zoloft so far and tolerating it    Repeat EKG was appreciated with no significant changes from before  Being on clozapine   Current medications:    Current Facility-Administered Medications:     acetaminophen (TYLENOL) tablet 650 mg, 650 mg, Oral, Q6H PRN, Jeet Levine MD    albuterol (PROVENTIL HFA,VENTOLIN HFA) inhaler 2 puff, 2 puff, Inhalation, Q4H PRN, Jeet Levine MD, 2 puff at 10/11/19 0424    aluminum-magnesium hydroxide-simethicone (MYLANTA) 200-200-20 mg/5 mL oral suspension 15 mL, 15 mL, Oral, Q4H PRN, Felisa Florence MD    ammonium lactate (LAC-HYDRIN) 12 % lotion 1 application, 1 application, Topical, BID PRN, Felisa Florence MD    benzonatate (TESSALON PERLES) capsule 100 mg, 100 mg, Oral, TID PRN, Felisa Florence MD    benztropine (COGENTIN) injection 1 mg, 1 mg, Intramuscular, Q8H PRN, Felisa Florence MD    carbamide peroxide (DEBROX) 6 5 % otic solution 5 drop, 5 drop, Left Ear, BID, Rona Davis MD, 5 drop at 01/14/20 0814    cloZAPine (CLOZARIL) tablet 25 mg, 25 mg, Oral, BID, Felisa Florence MD, 25 mg at 01/13/20 2041    cloZAPine (CLOZARIL) tablet 50 mg, 50 mg, Oral, BID, Felisa Florence MD, 50 mg at 01/13/20 2041    EPINEPHrine PF (ADRENALIN) 1 mg/mL injection 0 15 mg, 0 15 mg, Intramuscular, Once PRN, Felisa Florence MD    fluticasone-vilanterol (BREO ELLIPTA) 200-25 MCG/INH inhaler 1 puff, 1 puff, Inhalation, Daily, Felisa Florence MD, 1 puff at 01/14/20 0814    ketotifen (ZADITOR) 0 025 % ophthalmic solution 1 drop, 1 drop, Right Eye, BID PRN, Felisa Florence MD    levothyroxine tablet 125 mcg, 125 mcg, Oral, Early Morning, Felisa Florence MD, 125 mcg at 01/14/20 0602    magnesium hydroxide (MILK OF MAGNESIA) 400 mg/5 mL oral suspension 30 mL, 30 mL, Oral, Daily PRN, Felisa Florence MD    montelukast (SINGULAIR) tablet 10 mg, 10 mg, Oral, HS, Felisa Florence MD, 10 mg at 12/24/19 2109    OLANZapine (ZyPREXA) IM injection 5 mg, 5 mg, Intramuscular, Q8H PRN, Felisa Florence MD    OLANZapine (ZyPREXA) tablet 5 mg, 5 mg, Oral, Q8H PRN, Felisa Florence MD    ondansetron (ZOFRAN-ODT) dispersible tablet 4 mg, 4 mg, Oral, Q6H PRN, Felisa Florence MD, 4 mg at 12/09/19 1757    pantoprazole (PROTONIX) EC tablet 40 mg, 40 mg, Oral, Early Morning, Felisa Florence MD, 40 mg at 01/14/20 0602    polyethylene glycol (MIRALAX) packet 17 g, 17 g, Oral, Daily PRN, Felisa Florence MD    polyvinyl alcohol (LIQUIFILM TEARS) 1 4 % ophthalmic solution 1 drop, 1 drop, Both Eyes, Q3H PRN, MD Juli Vasquez sertraline (ZOLOFT) tablet 150 mg, 150 mg, Oral, Daily, Allie Guzman MD, 150 mg at 01/14/20 0813    sucralfate (CARAFATE) oral suspension 1,000 mg, 1,000 mg, Oral, BID, Sadie Holland MD, 1,000 mg at 01/14/20 0602    theophylline (JEF-24) 24 hr capsule 200 mg, 200 mg, Oral, Daily, Allie Guzman MD, 200 mg at 01/14/20 0813    tiotropium [facility]) capsule for inhaler 18 mcg, 18 mcg, Inhalation, Daily, Allie Guzman MD, 18 mcg at 01/14/20 0814    traZODone (DESYREL) tablet 25 mg, 25 mg, Oral, HS PRN, Allie Guzman MD  Justification if on more than two antipsychotics:  Only on his clozapine  Side effects if any:  None    Risks , benefits, side effects and precautions of medications discussed with patient and reminded patient to let us know any problems with medications     Objective:     Vital Signs:  Vitals:    01/13/20 1600 01/13/20 1949 01/14/20 0500 01/14/20 0700   BP: 98/72 100/74  109/56   BP Location: Left arm Left arm  Right arm   Pulse: 82 96  77   Resp: 15 15  18   Temp: 97 7 °F (36 5 °C) 97 5 °F (36 4 °C)  (!) 97 4 °F (36 3 °C)   TempSrc: Temporal Temporal     SpO2: 94% 91% 94%    Weight:       Height:         Appearance:  age appropriate, casually dressed and overweight older than stated age, appears poorly groomed with uncombed hair   Behavior:  evasive and guarded with no psychosomatic complaints, friendly but argumentative at times    Speech:  normal pitch and normal volume but circumstantial and tangential with stilted speech off and on   Mood:  anxious and dysthymic   Affect:  mood-congruent, elated and entitled anxious and irritated and sad at times   Thought Process:  goal directed and illogical slightly pressured and tangential and talks as if in a court of law   Thought Content:  No current suicidal homicidal thoughts intent or plans reported  No overt delusions elicited today    However she still has occasional psychosomatic symptoms like having breathing difficulty and swallowing difficulty and still upset with the fact that she is no longer eligible for portable oxygen and states she will try to go off the unit again with staff  Not voicing any suspicion about staff tampering with her inhalant or spirometer   Perceptual Disturbances: None and does not appear responding to internal stimuli    Risk Potential: Tendency to not care for herself    Sensorium:  person, place, time/date, situation, day of week, month of year and time   Cognition:  grossly intact with no deficits in recent or remote memory or immediate recall   Consciousness:  alert and awake    Attention: Intact concentration and attention span   Intellect: Considered to be at least of average intelligence   Insight:  limited but improving   Judgment: Improving slowly      Motor Activity: no abnormal movements         Recent Labs:  Results Reviewed     None          I/O Past 24 hours:  No intake/output data recorded  No intake/output data recorded  Assessment / Plan:     Schizoaffective disorder, [facility] ([facility]      Reason for continued inpatient care:  Because of underlying paranoia and inability to care for herself on her own and multiple somatic complaints  Acceptance by patient:  Accepting  Audelia Arthur in recovery:  About living in same personal care home at the [place] once she feels better  Understanding of medications :  Has some understanding  Involved in reintegration process: On off unit privileges now  Trusting in relatoinship with psychiatrist:  Trusting    Recommended Treatment:    Medication changes:  1) none today  Non-pharmacological treatments  1) continue with  individual therapy group therapy, milieu therapy and occupational therapy and milieu therapy involving multidisciplinary team approach with psychotherapist, case management, nursing, peer support services, art therapist etc using recovery principles and psycho-education about accepting illness and need for treatment     2) encourage to cooperate with behavior plan  3) encourage to attend to ADLs like taking showers and wearing clean clothes   4) Encourage to attend groups   5) see how she does on off hospital privileges and encourage to go with staff escort  and expectations discussed with her and she did verbalize an understanding, respiratory has decided she does not need any  portable oxygen   Safety  1) Safety/communication plan established targeting dynamic risk factors above  Discharge Plan back to the [facility] with act services and withdraw the [facility] referral due to improvement made so far  Counseling / Coordination of Care    Total floor / unit time spent today 15 minutes  Greater than 50% of total time was spent with the patient and / or family counseling and / or coordination of care  A description of the counseling / coordination of care  Patient's Rights, confidentiality and exceptions to confidentiality, use of automated medical record, Jeb Patrick staff access to medical record, and consent to treatment reviewed      Sindy Day MD

## 2020-01-14 NOTE — PROGRESS NOTES
Progress Note - Jose F Mahoney 1955, 60 y o  female MRN: [id number]    Unit/Bed#: [facility] 110-02 Encounter: [id number]    Primary Care Provider: Bebeto Smith PA-C   Date and time admitted to hospital: 7/23/2019  5:30 PM        * Schizoaffective disorder, bipolar type [facility])  Assessment & Plan  Psychiatry Progress Note  Patient is happy to announce she took a shower two days ago  Her grooming is better today and is wearing clean clothes but with uncombed hair  She claims that she is making it a point to attend more groups and did attend 83% last week  She plans to go to the community next week to [facility] with staff escort  She is now happy that she is taken off the waiting list for [facility]   She is not admitting to any thoughts about having cancer or dying from terrible illnesses nor does she talk about having an implant 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  She claims staff is no longer tampering with her spirometer and inhaler or with her medications  She still has a tendency to complain about swallowing difficulties and breathing difficulty and some other psychosomatic symptoms some times, but  been eating most of her meals  I did remind her again  that she needs to keep up with her hygiene like taking showers twice a week and attending 50% of groups to be able to return back to the [facility] for which CM has now made a referral and she is happy about that  She has been accepting the increase in Zoloft so far and tolerating it    Repeat EKG was appreciated with no significant changes from before  Being on clozapine   Current medications:    Current Facility-Administered Medications:     acetaminophen (TYLENOL) tablet 650 mg, 650 mg, Oral, Q6H PRN, Zander Yanez MD    albuterol (PROVENTIL HFA,VENTOLIN HFA) inhaler 2 puff, 2 puff, Inhalation, Q4H PRN, Zander Yanez MD, 2 puff at 10/11/19 0424    aluminum-magnesium hydroxide-simethicone (MYLANTA) 200-200-20 mg/5 mL oral suspension 15 mL, 15 mL, Oral, Q4H PRN, Alirio Frazier MD    ammonium lactate (LAC-HYDRIN) 12 % lotion 1 application, 1 application, Topical, BID PRN, Alirio Frazier MD    benzonatate (TESSALON PERLES) capsule 100 mg, 100 mg, Oral, TID PRN, Alirio Frazier MD    benztropine (COGENTIN) injection 1 mg, 1 mg, Intramuscular, Q8H PRN, Alirio Frazier MD    carbamide peroxide (DEBROX) 6 5 % otic solution 5 drop, 5 drop, Left Ear, BID, Rona Cruz MD, 5 drop at 01/14/20 0814    cloZAPine (CLOZARIL) tablet 25 mg, 25 mg, Oral, BID, Alirio Frazier MD, 25 mg at 01/13/20 2041    cloZAPine (CLOZARIL) tablet 50 mg, 50 mg, Oral, BID, Alirio Frazier MD, 50 mg at 01/13/20 2041    EPINEPHrine PF (ADRENALIN) 1 mg/mL injection 0 15 mg, 0 15 mg, Intramuscular, Once PRN, Alirio Frazier MD    fluticasone-vilanterol (BREO ELLIPTA) 200-25 MCG/INH inhaler 1 puff, 1 puff, Inhalation, Daily, Alirio Frazier MD, 1 puff at 01/14/20 0814    ketotifen (ZADITOR) 0 025 % ophthalmic solution 1 drop, 1 drop, Right Eye, BID PRN, Alirio Frazeir MD    levothyroxine tablet 125 mcg, 125 mcg, Oral, Early Morning, Alirio Frazier MD, 125 mcg at 01/14/20 0602    magnesium hydroxide (MILK OF MAGNESIA) 400 mg/5 mL oral suspension 30 mL, 30 mL, Oral, Daily PRN, Alirio Frazier MD    montelukast (SINGULAIR) tablet 10 mg, 10 mg, Oral, HS, Alirio Frazier MD, 10 mg at 12/24/19 2109    OLANZapine (ZyPREXA) IM injection 5 mg, 5 mg, Intramuscular, Q8H PRN, Alirio Frazier MD    OLANZapine (ZyPREXA) tablet 5 mg, 5 mg, Oral, Q8H PRN, Alirio Frazier MD    ondansetron (ZOFRAN-ODT) dispersible tablet 4 mg, 4 mg, Oral, Q6H PRN, Alirio Frazier MD, 4 mg at 12/09/19 1757    pantoprazole (PROTONIX) EC tablet 40 mg, 40 mg, Oral, Early Morning, Alirio Frazier MD, 40 mg at 01/14/20 0602    polyethylene glycol (MIRALAX) packet 17 g, 17 g, Oral, Daily PRN, Alirio Frazier MD    polyvinyl alcohol (LIQUIFILM TEARS) 1 4 % ophthalmic solution 1 drop, 1 drop, Both Eyes, Q3H PRN, MD Navin Le sertraline (ZOLOFT) tablet 150 mg, 150 mg, Oral, Daily, Roberto Shankar MD, 150 mg at 01/14/20 0813    sucralfate (CARAFATE) oral suspension 1,000 mg, 1,000 mg, Oral, BID, Mary Barnhart MD, 1,000 mg at 01/14/20 0602    theophylline (JEF-24) 24 hr capsule 200 mg, 200 mg, Oral, Daily, Roberto Shankar MD, 200 mg at 01/14/20 0813    tiotropium [facility]) capsule for inhaler 18 mcg, 18 mcg, Inhalation, Daily, Roberto Shankar MD, 18 mcg at 01/14/20 0814    traZODone (DESYREL) tablet 25 mg, 25 mg, Oral, HS PRN, Roberto Shankar MD  Justification if on more than two antipsychotics:  Only on his clozapine  Side effects if any:  None    Risks , benefits, side effects and precautions of medications discussed with patient and reminded patient to let us know any problems with medications     Objective:     Vital Signs:  Vitals:    01/13/20 1600 01/13/20 1949 01/14/20 0500 01/14/20 0700   BP: 98/72 100/74  109/56   BP Location: Left arm Left arm  Right arm   Pulse: 82 96  77   Resp: 15 15  18   Temp: 97 7 °F (36 5 °C) 97 5 °F (36 4 °C)  (!) 97 4 °F (36 3 °C)   TempSrc: Temporal Temporal     SpO2: 94% 91% 94%    Weight:       Height:         Appearance:  age appropriate, casually dressed and overweight older than stated age, appears better groomed with uncombed hair   Behavior:  evasive and guarded with no psychosomatic complaints, friendly and pleasant today   Speech:  normal pitch and normal volume but circumstantial and tangential with stilted speech off and on   Mood:  anxious and dysthymic   Affect:  mood-congruent, elated and entitled anxious and irritated and sad at times   Thought Process:  goal directed and illogical slightly pressured and tangential and talks as if in a court of law   Thought Content:  No current suicidal homicidal thoughts intent or plans reported  No overt delusions elicited today  Still admits to  occasional psychosomatic symptoms like having breathing difficulty and swallowing difficulty     Not voicing any suspicion about staff tampering with her inhalant or spirometer lately  Still not happy she does not need portable oxygen now   Perceptual Disturbances: None and does not appear responding to internal stimuli    Risk Potential: Tendency to not care for herself    Sensorium:  person, place, time/date, situation, day of week, month of year and time   Cognition:  grossly intact with no deficits in recent or remote memory or immediate recall   Consciousness:  alert and awake    Attention: Intact concentration and attention span   Intellect: Considered to be at least of average intelligence   Insight:  limited but improving   Judgment: Improving slowly      Motor Activity: no abnormal movements         Recent Labs:  Results Reviewed     None          I/O Past 24 hours:  No intake/output data recorded  No intake/output data recorded  Assessment / Plan:     Schizoaffective disorder, [facility] )      Reason for continued inpatient care:  Because of underlying paranoia and inability to care for herself on her own and multiple somatic complaints  Acceptance by patient:  Accepting  Teena Bashir in recovery:  About living in same personal care home at the [place] once she feels better  Understanding of medications :  Has some understanding  Involved in reintegration process: On off unit privileges now  Trusting in relatoinship with psychiatrist:  Trusting    Recommended Treatment:    Medication changes:  1) none today  Non-pharmacological treatments  1) continue with  individual therapy group therapy, milieu therapy and occupational therapy and milieu therapy involving multidisciplinary team approach with psychotherapist, case management, nursing, peer support services, art therapist etc using recovery principles and psycho-education about accepting illness and need for treatment     2) encourage to cooperate with behavior plan  3) encourage to attend to ADLs like taking showers and wearing clean clothes   4) Encourage to attend groups   5) see how she does on off grounds privileges and encourage to go off grounds with staff escort  and expectations discussed with her and she did verbalize an understanding, respiratory has decided she does not need any  portable oxygen   Safety  1) Safety/communication plan established targeting dynamic risk factors above  Discharge Plan back to the [facility] with act services and withdraw the [facility] FACILITY referral due to improvement made so far  Counseling / Coordination of Care    Total floor / unit time spent today 15 minutes  Greater than 50% of total time was spent with the patient and / or family counseling and / or coordination of care  A description of the counseling / coordination of care  Patient's Rights, confidentiality and exceptions to confidentiality, use of automated medical record, [facility] staff access to medical record, and consent to treatment reviewed      Jeet Levine MD

## 2020-01-14 NOTE — PROGRESS NOTES
01/14/20 0934   Team Meeting   Meeting Type Tx Team Meeting   Initial Conference Date 01/14/20   Next Conference Date 01/21/20   Team Members Present   Team Members Present Physician;Nurse;; Other (Discipline and Name)   Physician Team Member Dr Rosa Jimenez Team Member Jewels Snyder, RN   Care Management Team Member Brenda So   Other (Discipline and Name) Cesar Bhatt LCSW; Emilia Chase, [facility]   Patient/Family Present   Patient Present Yes   Patient's Family Present No     Patient attended treatment team meeting this morning without a self assessment completed  Patient attended 83% of groups offered last week  Patient currently has off grounds with staff privilege and plans to go out to [facility] with staff next Tuesday  Team and patient completed risk assessment and the patient did not verbalize any desire to elope from the program  Patient verbalized understanding of consequences of eloping from treatment while on a commitment  Patient verbalized no further questions or concerns at the conclusion of the meeting

## 2020-01-14 NOTE — PLAN OF CARE
Problem: Alteration in Thoughts and Perception  Goal: Agree to be compliant with medication regime, as prescribed and report medication side effects  Description  Interventions:  - Offer appropriate PRN medication and supervise ingestion; conduct aims, as needed   Outcome: Progressing  Goal: Attend and participate in unit activities, including therapeutic, recreational, and educational groups  Description  Interventions:  - Provide therapeutic and educational activities daily, encourage attendance and participation, and document same in the medical record     CERTIFIED PEER SPECIALIST INTERVENTIONS:    Complete peer assessment with patient to assess their needs and identify their goals to complete while in the recovery program as well as once discharged into the community  Patient will complete WRAP Plan, Crisis Plan and 5 Life Domains  Patient will attend 50% of groups offered on the unit  Patient will complete a goal card weekly  Outcome: Raiza Lynne has been intermittently visible on the unit, blunted in affect but brightens on approach  Compliant with her incentive spirometer (1250 mL this morning) and morning medications  Ate oatmeal, yogurt and orange juice for breakfast; ate ice cream, cookies and milk for lunch  Appetite remains questionable  Retreats to her room in between meals and select groups  Disheveled in appearance  No somatic complaints, or signs of distress reported/observed by this writer  Behaviors appropriate  Will continue to monitor for changes

## 2020-01-14 NOTE — ASSESSMENT & PLAN NOTE
Psychiatry Progress Note  Patient is happy to announce she took a shower two days ago  Her grooming is better today and is wearing clean clothes but with uncombed hair  She claims that she is making it a point to attend more groups and did attend 83% last week  She plans to go to the community next week to [facility] with staff escort  She is now happy that she is taken off the waiting list for [facility] TREATMENT FACILITY   She is not admitting to any thoughts about having cancer or dying from terrible illnesses nor does she talk about having an implant 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  She claims staff is no longer tampering with her spirometer and inhaler or with her medications  She still has a tendency to complain about swallowing difficulties and breathing difficulty and some other psychosomatic symptoms some times, but  been eating most of her meals  I did remind her again  that she needs to keep up with her hygiene like taking showers twice a week and attending 50% of groups to be able to return back to the [facility] for which CM has now made a referral and she is happy about that  She has been accepting the increase in Zoloft so far and tolerating it    Repeat EKG was appreciated with no significant changes from before  Being on clozapine   Current medications:    Current Facility-Administered Medications:     acetaminophen (TYLENOL) tablet 650 mg, 650 mg, Oral, Q6H PRN, Greer Beltran MD    albuterol (PROVENTIL HFA,VENTOLIN HFA) inhaler 2 puff, 2 puff, Inhalation, Q4H PRN, Greer Beltran MD, 2 puff at 10/11/19 0424    aluminum-magnesium hydroxide-simethicone (MYLANTA) 200-200-20 mg/5 mL oral suspension 15 mL, 15 mL, Oral, Q4H PRN, Greer Beltran MD    ammonium lactate (LAC-HYDRIN) 12 % lotion 1 application, 1 application, Topical, BID PRN, Greer Beltran MD    benzonatate (TESSALON PERLES) capsule 100 mg, 100 mg, Oral, TID PRN, Greer Beltran MD    benztropine (COGENTIN) injection 1 mg, 1 mg, Intramuscular, Q8H PRN, Isidoro Gonzalez MD    carbamide peroxide [facility]) 6 5 % otic solution 5 drop, 5 drop, Left Ear, BID, Rona Roman MD, 5 drop at 01/14/20 0814    cloZAPine (CLOZARIL) tablet 25 mg, 25 mg, Oral, BID, Isidoro Gonzalez MD, 25 mg at 01/13/20 2041    cloZAPine (CLOZARIL) tablet 50 mg, 50 mg, Oral, BID, Isidoro Gonzalez MD, 50 mg at 01/13/20 2041    EPINEPHrine PF (ADRENALIN) 1 mg/mL injection 0 15 mg, 0 15 mg, Intramuscular, Once PRN, Isidoro Gonzalez MD    fluticasone-vilanterol (BREO ELLIPTA) 200-25 MCG/INH inhaler 1 puff, 1 puff, Inhalation, Daily, Isidoro Gonzalez MD, 1 puff at 01/14/20 0814    ketotifen (ZADITOR) 0 025 % ophthalmic solution 1 drop, 1 drop, Right Eye, BID PRN, Isidoro Gonzalez MD    levothyroxine tablet 125 mcg, 125 mcg, Oral, Early Morning, Isidoro Gonzalez MD, 125 mcg at 01/14/20 0602    magnesium hydroxide (MILK OF MAGNESIA) 400 mg/5 mL oral suspension 30 mL, 30 mL, Oral, Daily PRN, Isidoro Gonzalez MD    montelukast (SINGULAIR) tablet 10 mg, 10 mg, Oral, HS, Isidoro Gonzalez MD, 10 mg at 12/24/19 2109    OLANZapine (ZyPREXA) IM injection 5 mg, 5 mg, Intramuscular, Q8H PRN, Isidoro Gonzalez MD    OLANZapine (ZyPREXA) tablet 5 mg, 5 mg, Oral, Q8H PRN, Isidoro Gonzalez MD    ondansetron (ZOFRAN-ODT) dispersible tablet 4 mg, 4 mg, Oral, Q6H PRN, Isidoro Gonzalez MD, 4 mg at 12/09/19 1757    pantoprazole (PROTONIX) EC tablet 40 mg, 40 mg, Oral, Early Morning, Isidoro Gonzalez MD, 40 mg at 01/14/20 0602    polyethylene glycol (MIRALAX) packet 17 g, 17 g, Oral, Daily PRN, Isidoro Gonzalez MD    polyvinyl alcohol (LIQUIFILM TEARS) 1 4 % ophthalmic solution 1 drop, 1 drop, Both Eyes, Q3H PRN, Isidoro Gonzalez MD    sertraline (ZOLOFT) tablet 150 mg, 150 mg, Oral, Daily, Isidoro Gonzalez MD, 150 mg at 01/14/20 0813    sucralfate (CARAFATE) oral suspension 1,000 mg, 1,000 mg, Oral, BID, Cat Torres MD, 1,000 mg at 01/14/20 0602    theophylline (JEF-24) 24 hr capsule 200 mg, 200 mg, Oral, Daily, Isidoro Gonzalez MD, 200 mg at 01/14/20 0813    tiotropium [facility]) capsule for inhaler 18 mcg, 18 mcg, Inhalation, Daily, Mynor Terry MD, 18 mcg at 01/14/20 0814    traZODone (DESYREL) tablet 25 mg, 25 mg, Oral, HS PRN, Mynor Terry MD  Justification if on more than two antipsychotics:  Only on his clozapine  Side effects if any:  None    Risks , benefits, side effects and precautions of medications discussed with patient and reminded patient to let us know any problems with medications     Objective:     Vital Signs:  Vitals:    01/13/20 1600 01/13/20 1949 01/14/20 0500 01/14/20 0700   BP: 98/72 100/74  109/56   BP Location: Left arm Left arm  Right arm   Pulse: 82 96  77   Resp: 15 15  18   Temp: 97 7 °F (36 5 °C) 97 5 °F (36 4 °C)  (!) 97 4 °F (36 3 °C)   TempSrc: Temporal Temporal     SpO2: 94% 91% 94%    Weight:       Height:         Appearance:  age appropriate, casually dressed and overweight older than stated age, appears better groomed with uncombed hair   Behavior:  evasive and guarded with no psychosomatic complaints, friendly and pleasant today   Speech:  normal pitch and normal volume but circumstantial and tangential with stilted speech off and on   Mood:  anxious and dysthymic   Affect:  mood-congruent, elated and entitled anxious and irritated and sad at times   Thought Process:  goal directed and illogical slightly pressured and tangential and talks as if in a court of law   Thought Content:  No current suicidal homicidal thoughts intent or plans reported  No overt delusions elicited today  Still admits to  occasional psychosomatic symptoms like having breathing difficulty and swallowing difficulty   Not voicing any suspicion about staff tampering with her inhalant or spirometer lately   Still not happy she does not need portable oxygen now   Perceptual Disturbances: None and does not appear responding to internal stimuli    Risk Potential: Tendency to not care for herself    Sensorium:  person, place, time/date, situation, day of week, month of year and time   Cognition:  grossly intact with no deficits in recent or remote memory or immediate recall   Consciousness:  alert and awake    Attention: Intact concentration and attention span   Intellect: Considered to be at least of average intelligence   Insight:  limited but improving   Judgment: Improving slowly      Motor Activity: no abnormal movements         Recent Labs:  Results Reviewed     None          I/O Past 24 hours:  No intake/output data recorded  No intake/output data recorded  Assessment / Plan:     Schizoaffective disorder, bipolar type ([facility] )      Reason for continued inpatient care:  Because of underlying paranoia and inability to care for herself on her own and multiple somatic complaints  Acceptance by patient:  Accepting  Kitty Sicard in recovery:  About living in same personal care home at the [place] once she feels better  Understanding of medications :  Has some understanding  Involved in reintegration process: On off unit privileges now  Trusting in relatoinship with psychiatrist:  Trusting    Recommended Treatment:    Medication changes:  1) none today  Non-pharmacological treatments  1) continue with  individual therapy group therapy, milieu therapy and occupational therapy and milieu therapy involving multidisciplinary team approach with psychotherapist, case management, nursing, peer support services, art therapist etc using recovery principles and psycho-education about accepting illness and need for treatment     2) encourage to cooperate with behavior plan  3) encourage to attend to ADLs like taking showers and wearing clean clothes   4) Encourage to attend groups   5) see how she does on off unit privileges and encourage to go off unit with staff escort  and expectations discussed with her and she did verbalize an understanding, respiratory has decided she does not need any  portable oxygen   Safety  1) Safety/communication plan established targeting dynamic risk factors above  Discharge Plan back to the [facility] with act services and withdraw the [facility]IDENT[facility]REATMENT FACILITY referral due to improvement made so far  Counseling / Coordination of Care    Total floor / unit time spent today 15 minutes  Greater than 50% of total time was spent with the patient and / or family counseling and / or coordination of care  A description of the counseling / coordination of care  Patient's Rights, confidentiality and exceptions to confidentiality, use of automated medical record, [facility] staff access to medical record, and consent to treatment reviewed      Vincent Olivares MD

## 2020-01-14 NOTE — ASSESSMENT & PLAN NOTE
Psychiatry Progress Note  Patient is happy to announce she took a shower two days ago  Her grooming is better today and is wearing clean clothes but with uncombed hair  She claims that she is making it a point to attend more groups and did attend 83% last week  She plans to go to the community next week to [facility] with staff escort  She is now happy that she is taken off the waiting list for [facility]   She is not admitting to any thoughts about having cancer or dying from terrible illnesses nor does she talk about having an implant 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  She claims staff is no longer tampering with her spirometer and inhaler or with her medications  She still has a tendency to complain about swallowing difficulties and breathing difficulty and some other psychosomatic symptoms some times, but  been eating most of her meals  I did remind her again  that she needs to keep up with her hygiene like taking showers twice a week and attending 50% of groups to be able to return back to the [facility] for which CM has now made a referral and she is happy about that  She has been accepting the increase in Zoloft so far and tolerating it    Repeat EKG was appreciated with no significant changes from before  Being on clozapine   Current medications:    Current Facility-Administered Medications:     acetaminophen (TYLENOL) tablet 650 mg, 650 mg, Oral, Q6H PRN, Shilpa Trujillo MD    albuterol (PROVENTIL HFA,VENTOLIN HFA) inhaler 2 puff, 2 puff, Inhalation, Q4H PRN, Shilpa Trujillo MD, 2 puff at 10/11/19 0424    aluminum-magnesium hydroxide-simethicone (MYLANTA) 200-200-20 mg/5 mL oral suspension 15 mL, 15 mL, Oral, Q4H PRN, Shilpa Trujillo MD    ammonium lactate (LAC-HYDRIN) 12 % lotion 1 application, 1 application, Topical, BID PRN, Shilpa Trujillo MD    benzonatate (TESSALON PERLES) capsule 100 mg, 100 mg, Oral, TID PRN, Shilpa Trujillo MD    benztropine (COGENTIN) injection 1 mg, 1 mg, Intramuscular, Q8H PRN, Zac Sierra MD    carbamide peroxide [place] [facility]) 6 5 % otic solution 5 drop, 5 drop, Left Ear, BID, Rona Roy MD, 5 drop at 01/14/20 0814    cloZAPine (CLOZARIL) tablet 25 mg, 25 mg, Oral, BID, Zac Sierra MD, 25 mg at 01/13/20 2041    cloZAPine (CLOZARIL) tablet 50 mg, 50 mg, Oral, BID, Zac Sierra MD, 50 mg at 01/13/20 2041    EPINEPHrine PF (ADRENALIN) 1 mg/mL injection 0 15 mg, 0 15 mg, Intramuscular, Once PRN, Zac Sierra MD    fluticasone-vilanterol (BREO ELLIPTA) 200-25 MCG/INH inhaler 1 puff, 1 puff, Inhalation, Daily, Zac Sierra MD, 1 puff at 01/14/20 0814    ketotifen (ZADITOR) 0 025 % ophthalmic solution 1 drop, 1 drop, Right Eye, BID PRN, Zac Sierra MD    levothyroxine tablet 125 mcg, 125 mcg, Oral, Early Morning, Zac Sierra MD, 125 mcg at 01/14/20 0602    magnesium hydroxide (MILK OF MAGNESIA) 400 mg/5 mL oral suspension 30 mL, 30 mL, Oral, Daily PRN, Zac Sierra MD    montelukast (SINGULAIR) tablet 10 mg, 10 mg, Oral, HS, Zac Sierra MD, 10 mg at 12/24/19 2109    OLANZapine (ZyPREXA) IM injection 5 mg, 5 mg, Intramuscular, Q8H PRN, Zac Sierra MD    OLANZapine (ZyPREXA) tablet 5 mg, 5 mg, Oral, Q8H PRN, Zac Sierra MD    ondansetron (ZOFRAN-ODT) dispersible tablet 4 mg, 4 mg, Oral, Q6H PRN, Zac Sierra MD, 4 mg at 12/09/19 1757    pantoprazole (PROTONIX) EC tablet 40 mg, 40 mg, Oral, Early Morning, Zac Sierra MD, 40 mg at 01/14/20 0602    polyethylene glycol (MIRALAX) packet 17 g, 17 g, Oral, Daily PRN, Zac Sierra MD    polyvinyl alcohol (LIQUIFILM TEARS) 1 4 % ophthalmic solution 1 drop, 1 drop, Both Eyes, Q3H PRN, Zac Sierra MD    sertraline (ZOLOFT) tablet 150 mg, 150 mg, Oral, Daily, Zac Sierra MD, 150 mg at 01/14/20 0813    sucralfate (CARAFATE) oral suspension 1,000 mg, 1,000 mg, Oral, BID, Cat Torres MD, 1,000 mg at 01/14/20 0602    theophylline (JEF-24) 24 hr capsule 200 mg, 200 mg, Oral, Daily, Zac Sierra MD, 200 mg at 01/14/20 0813    tiotropium [facility]) capsule for inhaler 18 mcg, 18 mcg, Inhalation, Daily, Sindy Day MD, 18 mcg at 01/14/20 0814    traZODone (DESYREL) tablet 25 mg, 25 mg, Oral, HS PRN, Sindy Day MD  Justification if on more than two antipsychotics:  Only on his clozapine  Side effects if any:  None    Risks , benefits, side effects and precautions of medications discussed with patient and reminded patient to let us know any problems with medications     Objective:     Vital Signs:  Vitals:    01/13/20 1600 01/13/20 1949 01/14/20 0500 01/14/20 0700   BP: 98/72 100/74  109/56   BP Location: Left arm Left arm  Right arm   Pulse: 82 96  77   Resp: 15 15  18   Temp: 97 7 °F (36 5 °C) 97 5 °F (36 4 °C)  (!) 97 4 °F (36 3 °C)   TempSrc: Temporal Temporal     SpO2: 94% 91% 94%    Weight:       Height:         Appearance:  age appropriate, casually dressed and overweight older than stated age, appears poorly groomed with uncombed hair   Behavior:  evasive and guarded with no psychosomatic complaints, friendly but argumentative at times    Speech:  normal pitch and normal volume but circumstantial and tangential with stilted speech off and on   Mood:  anxious and dysthymic   Affect:  mood-congruent, elated and entitled anxious and irritated and sad at times   Thought Process:  goal directed and illogical slightly pressured and tangential and talks as if in a court of law   Thought Content:  No current suicidal homicidal thoughts intent or plans reported  No overt delusions elicited today  However she still has occasional psychosomatic symptoms like having breathing difficulty and swallowing difficulty and still upset with the fact that she is no longer eligible for portable oxygen and states she will try to go off the unit again with staff    Not voicing any suspicion about staff tampering with her inhalant or spirometer   Perceptual Disturbances: None and does not appear responding to internal stimuli    Risk Potential: Tendency to not care for herself    Sensorium:  person, place, time/date, situation, day of week, month of year and time   Cognition:  grossly intact with no deficits in recent or remote memory or immediate recall   Consciousness:  alert and awake    Attention: Intact concentration and attention span   Intellect: Considered to be at least of average intelligence   Insight:  limited but improving   Judgment: Improving slowly      Motor Activity: no abnormal movements         Recent Labs:  Results Reviewed     None          I/O Past 24 hours:  No intake/output data recorded  No intake/output data recorded  Assessment / Plan:     Schizoaffective disorder, bipolar type ([facility] [address] )      Reason for continued inpatient care:  Because of underlying paranoia and inability to care for herself on her own and multiple somatic complaints  Acceptance by patient:  Accepting  Willaim File in recovery:  About living in same personal care home at the [facility] once she feels better  Understanding of medications :  Has some understanding  Involved in reintegration process: On off unit privileges now  Trusting in relatoinship with psychiatrist:  Trusting    Recommended Treatment:    Medication changes:  1) none today  Non-pharmacological treatments  1) continue with  individual therapy group therapy, milieu therapy and occupational therapy and milieu therapy involving multidisciplinary team approach with psychotherapist, case management, nursing, peer support services, art therapist etc using recovery principles and psycho-education about accepting illness and need for treatment     2) encourage to cooperate with behavior plan  3) encourage to attend to ADLs like taking showers and wearing clean clothes   4) Encourage to attend groups   5) see how she does on off unit privileges and encourage to go off unit with staff escort  and expectations discussed with her and she did verbalize an understanding, respiratory has decided she does not need any  portable oxygen   Safety  1) Safety/communication plan established targeting dynamic risk factors above  Discharge Plan back to the [facility] with act services and withdraw the [facility] referral due to improvement made so far  Counseling / Coordination of Care    Total floor / unit time spent today 15 minutes  Greater than 50% of total time was spent with the patient and / or family counseling and / or coordination of care  A description of the counseling / coordination of care  Patient's Rights, confidentiality and exceptions to confidentiality, use of automated medical record, [facility]eline staff access to medical record, and consent to treatment reviewed      Christian Bennett MD

## 2020-01-14 NOTE — PROGRESS NOTES
Patient not scheduled to attend      01/14/20 1430   Activity/Group Checklist   Group   (Community Programming Wrap up)   Attendance Did not attend   Attendance Duration (min) 16-30   Affect/Mood IRMA

## 2020-01-15 NOTE — PROGRESS NOTES
01/15/20 0900   Team Meeting   Meeting Type Daily Rounds   Team Members Present   Team Members Present Physician;Nurse;; Other (Discipline and Name)   Physician Team Member Dr Ej Hinojosa, RN   Care Management Team Member Brenda Mai   Other (Discipline and Name) Omar Beaver LCSW; Soren Wade, SHANIKA     Patient attended [facility] groups  Less somatic  Slept

## 2020-01-15 NOTE — PLAN OF CARE
Problem: Alteration in Thoughts and Perception  Goal: Verbalize thoughts and feelings  Description  Interventions:  - Promote a nonjudgmental and trusting relationship with the patient through active listening and therapeutic communication  - Assess patient's level of functioning, behavior and potential for risk  - Engage patient in 1 on 1 interactions for a minimum of 15 minutes each session  - Encourage patient to express fears, feelings, frustrations, and discuss symptoms    - [place] patient to reality, help patient recognize reality-based thinking   - Administer medications as ordered and assess for potential side effects  - Provide the patient education related to the signs and symptoms of the illness and desired effects of prescribed medications  Outcome: Progressing  Goal: Attend and participate in unit activities, including therapeutic, recreational, and educational groups  Description  Interventions:  - Provide therapeutic and educational activities daily, encourage attendance and participation, and document same in the medical record     CERTIFIED PEER SPECIALIST INTERVENTIONS:    Complete peer assessment with patient to assess their needs and identify their goals to complete while in the recovery program as well as once discharged into the community  Patient will complete WRAP Plan, Crisis Plan and 5 Life Domains  Patient will attend 50% of groups offered on the unit  Patient will complete a goal card weekly      Outcome: Progressing     Problem: Alteration in Orientation  Goal: Interact with staff daily  Description  Interventions:  - Assess and re-assess patient's level of orientation  - Engage patient in 1 on 1 interactions, daily, for a minimum of 15 minutes   - Establish rapport/trust with patient   Outcome: Progressing     Problem: SAFETY ADULT  Goal: Patient will remain free of falls  Description  INTERVENTIONS:  - Assess patient frequently for physical needs  -  Identify cognitive and physical deficits and behaviors that affect risk of falls  -  [place] fall precautions as indicated by assessment   - Educate patient/family on patient safety including physical limitations  - Instruct patient to call for assistance with activity based on assessment  - Modify environment to reduce risk of injury  - Consider OT/PT consult to assist with strengthening/mobility  Outcome: Progressing     Problem: Alteration in Thoughts and Perception  Goal: Complete daily ADLs, including personal hygiene independently, as able  Description  Interventions:  - Observe, teach, and assist patient with ADLS  - Monitor and promote a balance of rest/activity, with adequate nutrition and elimination   Outcome: Jannette Lashawn Verma was in the dining room at the beginning of the shift conversing with female peer  Social with staff and select peers  Pleasant and cooperative  Denies anxiety, but stated "some depression " She was talking about how her son called her today  Depressed about him being away in the [facility] and that she is here in the [facility]  Smiles while conversing  No somatic complaints  Came for medication and swallowed pills without difficulty  Ate 100% of her meal  No shower this shift  No issues  Attended evening group  Took HS medication without prompting  Ate snack  Oxygen saturation 91% on room air prior to oxygen 1 liter via nasal cannula applied  Continue to monitor  Precautions maintained

## 2020-01-15 NOTE — PLAN OF CARE
Problem: Alteration in Thoughts and Perception  Goal: Agree to be compliant with medication regime, as prescribed and report medication side effects  Description  Interventions:  - Offer appropriate PRN medication and supervise ingestion; conduct aims, as needed   Outcome: Progressing  Goal: Attend and participate in unit activities, including therapeutic, recreational, and educational groups  Description  Interventions:  - Provide therapeutic and educational activities daily, encourage attendance and participation, and document same in the medical record     CERTIFIED PEER SPECIALIST INTERVENTIONS:    Complete peer assessment with patient to assess their needs and identify their goals to complete while in the recovery program as well as once discharged into the community  Patient will complete WRAP Plan, Crisis Plan and 5 Life Domains  Patient will attend 50% of groups offered on the unit  Patient will complete a goal card weekly  Outcome: Progressing  Goal: Recognize dysfunctional thoughts, communicate reality-based thoughts at the time of discharge  Description  Interventions:  - Provide medication and psycho-education to assist patient in compliance and developing insight into his/her illness   Outcome: Progressing     Problem: Alteration in Thoughts and Perception  Goal: Complete daily ADLs, including personal hygiene independently, as able  Description  Interventions:  - Observe, teach, and assist patient with ADLS  - Monitor and promote a balance of rest/activity, with adequate nutrition and elimination   Outcome: Not Progressing     Pt visible on unit  Social with select peers and staff  Pt ate 50% of dinner plus 100% of ensure  Pt compliant with meds, without prompting and before snack this evening, other than singulair  Pt attended evening group  No issues or concerns  No somatic complaints  Pt pleasant and cooperative  Will continue to monitor

## 2020-01-15 NOTE — PLAN OF CARE
Problem: Alteration in Thoughts and Perception  Goal: Agree to be compliant with medication regime, as prescribed and report medication side effects  Description  Interventions:  - Offer appropriate PRN medication and supervise ingestion; conduct aims, as needed   Outcome: Progressing     Problem: Risk for Self Injury/Neglect  Goal: Refrain from harming self  Description  Interventions:  - Monitor patient closely, per order  - Develop a trusting relationship  - Supervise medication ingestion, monitor effects and side effects   Outcome: Progressing     Problem: Depression  Goal: Verbalize thoughts and feelings  Description  Interventions:  - Assess and re-assess patient's level of risk   - Engage patient in 1:1 interactions, daily, for a minimum of 15 minutes   - Encourage patient to express feelings, fears, frustrations, hopes   Outcome: Progressing     Problem: Alteration in Orientation  Goal: Interact with staff daily  Description  Interventions:  - Assess and re-assess patient's level of orientation  - Engage patient in 1 on 1 interactions, daily, for a minimum of 15 minutes   - Establish rapport/trust with patient   Outcome: Progressing     Problem: PAIN - ADULT  Goal: Verbalizes/displays adequate comfort level or baseline comfort level  Description  Interventions:  - Encourage patient to monitor pain and request assistance  - Assess pain using appropriate pain scale  - Administer analgesics based on type and severity of pain and evaluate response  - Implement non-pharmacological measures as appropriate and evaluate response  - Consider cultural and social influences on pain and pain management  - Notify physician/advanced practitioner if interventions unsuccessful or patient reports new pain  Outcome: Progressing    Patient isolative to self and room throughout majority of shift, med and meal compliant with prompting required   C/O feeling 'low O2 saturation' this AM, when checked patient was 93% on room air and offered no further complaint  Tolerating medications whole, compliant with inhalers, returned to bed after breakfast and meds and remained in bed until lunch, good appetite noted this shift  Pt denies any depression or anxiety, continues with minimal somatic complaints  Denies AH/VH/SI/HI at this time  Will continue patient to get OOB and interact with staff/peers, attend milieu activities, and express self in a safe and effective manner  Maintain safe precautions as ordered

## 2020-01-15 NOTE — PROGRESS NOTES
Progress Note - Nina Bello 1955, 60 y o  female MRN: [id number]    Unit/Bed#: [facility] 110-02 Encounter: [id number]    Primary Care Provider: Kendrick Schultz PA-C   Date and time admitted to hospital: 7/23/2019  5:30 PM        * Schizoaffective disorder, bipolar type [facility])  Assessment & Plan  Psychiatry Progress Note  Patient is attending IMR groups now and is now looking forward to go to the community next week to [place] with staff escort  Her grooming is better today and is wearing clean clothes but with uncombed hair  She claims that she is making it a point to attend more groups  She is now happy that she is taken off the waiting list for [facility] and looking forward to going [facility] the [facility]  She is no longer vebalizing that she has  cancer or dying from terrible illnesses nor does she talk about having an implant 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 in several weeks in a row  She is not accusing staff of  tampering with her spirometer and inhaler or with her medications lately  She still has a tendency to complain about swallowing difficulties and breathing difficulty and some other psychosomatic symptoms off and on and has been eating most of her meals  I did remind her again  that she needs to keep up with her hygiene like taking showers twice a week and attending 50% of groups to be able to return back to the  personal care home     Current medications:    Current Facility-Administered Medications:     acetaminophen (TYLENOL) tablet 650 mg, 650 mg, Oral, Q6H PRN, Jill Gayle MD    albuterol (PROVENTIL HFA,VENTOLIN HFA) inhaler 2 puff, 2 puff, Inhalation, Q4H PRN, Jill Gayle MD, 2 puff at 10/11/19 0424    aluminum-magnesium hydroxide-simethicone (MYLANTA) 200-200-20 mg/5 mL oral suspension 15 mL, 15 mL, Oral, Q4H PRN, Jill Gayle MD    ammonium lactate (LAC-HYDRIN) 12 % lotion 1 application, 1 application, Topical, BID PRN, Jill Gayle MD    benzonatate [facility] PERLES) capsule 100 mg, 100 mg, Oral, TID PRN, Shaggy Rangel MD    benztropine (COGENTIN) injection 1 mg, 1 mg, Intramuscular, Q8H PRN, Shaggy Rangel MD    carbamide peroxide (DEBROX) 6 5 % otic solution 5 drop, 5 drop, Left Ear, BID, Rona Pitts MD, 5 drop at 01/14/20 0814    cloZAPine (CLOZARIL) tablet 25 mg, 25 mg, Oral, BID, Shaggy Rangel MD, 25 mg at 01/14/20 2040    cloZAPine (CLOZARIL) tablet 50 mg, 50 mg, Oral, BID, Shaggy Rangel MD, 50 mg at 01/14/20 2041    EPINEPHrine PF (ADRENALIN) 1 mg/mL injection 0 15 mg, 0 15 mg, Intramuscular, Once PRN, Shaggy Rangel MD    fluticasone-vilanterol (BREO ELLIPTA) 200-25 MCG/INH inhaler 1 puff, 1 puff, Inhalation, Daily, Shaggy Rangel MD, 1 puff at 01/14/20 0814    ketotifen (ZADITOR) 0 025 % ophthalmic solution 1 drop, 1 drop, Right Eye, BID PRN, Shaggy Rangel MD    levothyroxine tablet 125 mcg, 125 mcg, Oral, Early Morning, Shaggy Rangel MD, 125 mcg at 01/15/20 1916    magnesium hydroxide (MILK OF MAGNESIA) 400 mg/5 mL oral suspension 30 mL, 30 mL, Oral, Daily PRN, Shaggy Rangel MD    montelukast (SINGULAIR) tablet 10 mg, 10 mg, Oral, HS, Shaggy Rangel MD, 10 mg at 12/24/19 2109    OLANZapine (ZyPREXA) IM injection 5 mg, 5 mg, Intramuscular, Q8H PRN, Shaggy Rangel MD    OLANZapine (ZyPREXA) tablet 5 mg, 5 mg, Oral, Q8H PRN, Shaggy Rangel MD    ondansetron (ZOFRAN-ODT) dispersible tablet 4 mg, 4 mg, Oral, Q6H PRN, Shaggy Rangel MD, 4 mg at 12/09/19 1757    pantoprazole (PROTONIX) EC tablet 40 mg, 40 mg, Oral, Early Morning, Shaggy Rangel MD, 40 mg at 01/15/20 0350    polyethylene glycol (MIRALAX) packet 17 g, 17 g, Oral, Daily PRN, Shaggy Rangel MD    polyvinyl alcohol (LIQUIFILM TEARS) 1 4 % ophthalmic solution 1 drop, 1 drop, Both Eyes, Q3H PRN, Shaggy Rangel MD    sertraline (ZOLOFT) tablet 150 mg, 150 mg, Oral, Daily, Shaggy Rangel MD, 150 mg at 01/14/20 0813    sucralfate (CARAFATE) oral suspension 1,000 mg, 1,000 mg, Oral, BID, Denver Rosen MD, 1,000 mg at 01/15/20 5466    theophylline (JEF-24) 24 hr capsule 200 mg, 200 mg, Oral, Daily, Teri Huggins MD, 200 mg at 01/14/20 0813    tiotropium [facility]) capsule for inhaler 18 mcg, 18 mcg, Inhalation, Daily, Teri Huggins MD, 18 mcg at 01/14/20 0814    traZODone (DESYREL) tablet 25 mg, 25 mg, Oral, HS PRN, Teri Huggins MD  Justification if on more than two antipsychotics:  Only on his clozapine  Side effects if any:  None    Risks , benefits, side effects and precautions of medications discussed with patient and reminded patient to let us know any problems with medications     Objective:     Vital Signs:  Vitals:    01/14/20 0700 01/14/20 1600 01/14/20 1900 01/15/20 0621   BP: 109/56 100/69 94/51    BP Location: Right arm Left arm Left arm    Pulse: 77 79 68    Resp: 18 18 16    Temp: (!) 97 4 °F (36 3 °C) 99 7 °F (37 6 °C) 97 7 °F (36 5 °C)    TempSrc:  Temporal Temporal    SpO2:  92% (!) 68% 95%   Weight:       Height:         Appearance:  age appropriate, casually dressed and overweight older than stated age, appears better groomed with uncombed hair preferring to lay back on bed   Behavior:  evasive and guarded with no psychosomatic complaints, friendly and pleasant but refusing to talk with the PA student   Speech:  normal pitch and normal volume but circumstantial and tangential with stilted speech off and on   Mood:  anxious and dysthymic   Affect:  mood-congruent, elated and entitled anxious and irritated and sad at times   Thought Process:  goal directed and illogical slightly pressured and tangential and talks as if in a court of law   Thought Content:  No current suicidal homicidal thoughts intent or plans reported  No overt delusions elicited today  Still admits to  occasional psychosomatic symptoms like having breathing difficulty and swallowing difficulty   Not voicing any suspicion about staff tampering with her inhalant or spirometer lately   Still not happy she does not need portable oxygen now but did not bring that up today   Perceptual Disturbances: None and does not appear responding to internal stimuli    Risk Potential: Tendency to not care for herself    Sensorium:  person, place, time/date, situation, day of week, month of year and time   Cognition:  grossly intact with no deficits in recent or remote memory or immediate recall   Consciousness:  alert and awake    Attention: Intact concentration and attention span   Intellect: Considered to be at least of average intelligence   Insight:  limited but improving   Judgment: Improving slowly      Motor Activity: no abnormal movements         Recent Labs:  Results Reviewed     None          I/O Past 24 hours:  No intake/output data recorded  No intake/output data recorded  Assessment / Plan:     Schizoaffective disorder, bipolar type ([facility] [address] )      Reason for continued inpatient care:  Because of underlying paranoia and inability to care for herself on her own and multiple somatic complaints  Acceptance by patient:  Accepting  [place] in recovery:  About living in same personal care home at the [place] once she feels better  Understanding of medications :  Has some understanding  Involved in reintegration process: On off unit privileges now  Trusting in relatoinship with psychiatrist:  Trusting    Recommended Treatment:    Medication changes:  1) none today  Non-pharmacological treatments  1) continue with  individual therapy group therapy, milieu therapy and occupational therapy and milieu therapy involving multidisciplinary team approach with psychotherapist, case management, nursing, peer support services, art therapist etc using recovery principles and psycho-education about accepting illness and need for treatment     2) encourage to cooperate with behavior plan  3) encourage to attend to ADLs like taking showers and wearing clean clothes   4) Encourage to attend groups   5) see how she does on off unit privileges and encourage to go off unit with staff escort  and expectations discussed with her and she did verbalize an understanding, respiratory has decided she does not need any  portable oxygen   Safety  1) Safety/communication plan established targeting dynamic risk factors above  Discharge Plan back to the [facility] with act services and withdraw the [facility] referral due to improvement made so far  Counseling / Coordination of Care    Total floor / unit time spent today 15 minutes  Greater than 50% of total time was spent with the patient and / or family counseling and / or coordination of care  A description of the counseling / coordination of care  Patient's Rights, confidentiality and exceptions to confidentiality, use of automated medical record, [facility] staff access to medical record, and consent to treatment reviewed      Faheem Daniels MD

## 2020-01-15 NOTE — PROGRESS NOTES
01/15/20 1100   Activity/Group Checklist   Group   (IMR/Discharge Anxiety )   Attendance Attended   Attendance Duration (min) 46-60   Interactions Interacted appropriately   Affect/Mood Appropriate;Normal range   Goals Achieved Identified feelings; Able to listen to others; Able to engage in interactions; Able to reflect/comment on own behavior;Able to self-disclose

## 2020-01-15 NOTE — PROGRESS NOTES
01/14/20 1500   Activity/Group Checklist   Group   (Recovery Workshop )   Attendance Attended   Attendance Duration (min) 46-60   Interactions Interacted appropriately   Affect/Mood Appropriate;Normal range   Goals Achieved Identified feelings; Identified resources and support systems; Able to listen to others; Able to engage in interactions

## 2020-01-15 NOTE — ASSESSMENT & PLAN NOTE
Psychiatry Progress Note  Patient is attending IMR groups now and is now looking forward to go to the community next week to [facility] with staff escort  Her grooming is better today and is wearing clean clothes but with uncombed hair  She claims that she is making it a point to attend more groups  She is now happy that she is taken off the waiting list for [facility] and looking forward to going back to the [facility]  She is no longer vebalizing that she has  cancer or dying from terrible illnesses nor does she talk about having an implant 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 in several weeks in a row  She is not accusing staff of  tampering with her spirometer and inhaler or with her medications lately  She still has a tendency to complain about swallowing difficulties and breathing difficulty and some other psychosomatic symptoms off and on and has been eating most of her meals  I did remind her again  that she needs to keep up with her hygiene like taking showers twice a week and attending 50% of groups to be able to return back to the  personal care home     Current medications:    Current Facility-Administered Medications:     acetaminophen (TYLENOL) tablet 650 mg, 650 mg, Oral, Q6H PRN, Mynor Terry MD    albuterol (PROVENTIL HFA,VENTOLIN HFA) inhaler 2 puff, 2 puff, Inhalation, Q4H PRN, Mynor Terry MD, 2 puff at 10/11/19 0424    aluminum-magnesium hydroxide-simethicone (MYLANTA) 200-200-20 mg/5 mL oral suspension 15 mL, 15 mL, Oral, Q4H PRN, Mynor Terry MD    ammonium lactate (LAC-HYDRIN) 12 % lotion 1 application, 1 application, Topical, BID PRN, Mynor Terry MD    benzonatate (TESSALON PERLES) capsule 100 mg, 100 mg, Oral, TID PRN, Mynor Terry MD    benztropine (COGENTIN) injection 1 mg, 1 mg, Intramuscular, Q8H PRN, Mynor Terry MD    carbamide peroxide [facility]) 6 5 % otic solution 5 drop, 5 drop, Left Ear, BID, Mame Noble Aschoff, MD, 5 drop at 01/14/20 0814    cloZAPine (CLOZARIL) tablet 25 mg, 25 mg, Oral, BID, Isidoro Gonzalez MD, 25 mg at 01/14/20 2040    cloZAPine (CLOZARIL) tablet 50 mg, 50 mg, Oral, BID, Isidoro Gonzalez MD, 50 mg at 01/14/20 2041    EPINEPHrine PF (ADRENALIN) 1 mg/mL injection 0 15 mg, 0 15 mg, Intramuscular, Once PRN, Isidoro Gonzalez MD    fluticasone-vilanterol (BREO ELLIPTA) 200-25 MCG/INH inhaler 1 puff, 1 puff, Inhalation, Daily, Isidoro Gonzalez MD, 1 puff at 01/14/20 0814    ketotifen (ZADITOR) 0 025 % ophthalmic solution 1 drop, 1 drop, Right Eye, BID PRN, Isidoro Gonzalez MD    levothyroxine tablet 125 mcg, 125 mcg, Oral, Early Morning, Isidoro Gonzalez MD, 125 mcg at 01/15/20 2685    magnesium hydroxide (MILK OF MAGNESIA) 400 mg/5 mL oral suspension 30 mL, 30 mL, Oral, Daily PRN, Isidoro Gonzalez MD    montelukast (SINGULAIR) tablet 10 mg, 10 mg, Oral, HS, Isidoro Gonzalez MD, 10 mg at 12/24/19 2109    OLANZapine (ZyPREXA) IM injection 5 mg, 5 mg, Intramuscular, Q8H PRN, Isidoro Gonzalez MD    OLANZapine (ZyPREXA) tablet 5 mg, 5 mg, Oral, Q8H PRN, Isidoro Gonzalez MD    ondansetron (ZOFRAN-ODT) dispersible tablet 4 mg, 4 mg, Oral, Q6H PRN, Isiodro Gonzalez MD, 4 mg at 12/09/19 1757    pantoprazole (PROTONIX) EC tablet 40 mg, 40 mg, Oral, Early Morning, Isidoro Gonzalez MD, 40 mg at 01/15/20 1124    polyethylene glycol (MIRALAX) packet 17 g, 17 g, Oral, Daily PRN, Isidoro Gonzalez MD    polyvinyl alcohol (LIQUIFILM TEARS) 1 4 % ophthalmic solution 1 drop, 1 drop, Both Eyes, Q3H PRN, Isidoro Gonzalez MD    sertraline (ZOLOFT) tablet 150 mg, 150 mg, Oral, Daily, Isidoro Gonzalez MD, 150 mg at 01/14/20 0813    sucralfate (CARAFATE) oral suspension 1,000 mg, 1,000 mg, Oral, BID, Cat Torres MD, 1,000 mg at 01/15/20 4214    theophylline (JEF-24) 24 hr capsule 200 mg, 200 mg, Oral, Daily, Isidoro Gonzalez MD, 200 mg at 01/14/20 0813    tiotropium (SPIRIVA) capsule for inhaler 18 mcg, 18 mcg, Inhalation, Daily, Isidoro Gonzalez MD, 18 mcg at 01/14/20 0814    traZODone (DESYREL) tablet 25 mg, 25 mg, Oral, HS PRN, Manuel Copeland MD  Justification if on more than two antipsychotics:  Only on his clozapine  Side effects if any:  None    Risks , benefits, side effects and precautions of medications discussed with patient and reminded patient to let us know any problems with medications     Objective:     Vital Signs:  Vitals:    01/14/20 0700 01/14/20 1600 01/14/20 1900 01/15/20 0621   BP: 109/56 100/69 94/51    BP Location: Right arm Left arm Left arm    Pulse: 77 79 68    Resp: 18 18 16    Temp: (!) 97 4 °F (36 3 °C) 99 7 °F (37 6 °C) 97 7 °F (36 5 °C)    TempSrc:  Temporal Temporal    SpO2:  92% (!) 68% 95%   Weight:       Height:         Appearance:  age appropriate, casually dressed and overweight older than stated age, appears better groomed with uncombed hair   Behavior:  evasive and guarded with no psychosomatic complaints, friendly and pleasant today   Speech:  normal pitch and normal volume but circumstantial and tangential with stilted speech off and on   Mood:  anxious and dysthymic   Affect:  mood-congruent, elated and entitled anxious and irritated and sad at times   Thought Process:  goal directed and illogical slightly pressured and tangential and talks as if in a court of law   Thought Content:  No current suicidal homicidal thoughts intent or plans reported  No overt delusions elicited today  Still admits to  occasional psychosomatic symptoms like having breathing difficulty and swallowing difficulty   Not voicing any suspicion about staff tampering with her inhalant or spirometer lately   Still not happy she does not need portable oxygen now   Perceptual Disturbances: None and does not appear responding to internal stimuli    Risk Potential: Tendency to not care for herself    Sensorium:  person, place, time/date, situation, day of week, month of year and time   Cognition:  grossly intact with no deficits in recent or remote memory or immediate recall   Consciousness:  alert and awake    Attention: Intact concentration and attention span   Intellect: Considered to be at least of average intelligence   Insight:  limited but improving   Judgment: Improving slowly      Motor Activity: no abnormal movements         Recent Labs:  Results Reviewed     None          I/O Past 24 hours:  No intake/output data recorded  No intake/output data recorded  Assessment / Plan:     Schizoaffective disorder, bipolar type ([facility] [address] )      Reason for continued inpatient care:  Because of underlying paranoia and inability to care for herself on her own and multiple somatic complaints  Acceptance by patient:  Accepting  [place] in recovery:  About living in same personal care home at the [place] once she feels better  Understanding of medications :  Has some understanding  Involved in reintegration process: On off unit privileges now  Trusting in relatoinship with psychiatrist:  Trusting    Recommended Treatment:    Medication changes:  1) none today  Non-pharmacological treatments  1) continue with  individual therapy group therapy, milieu therapy and occupational therapy and milieu therapy involving multidisciplinary team approach with psychotherapist, case management, nursing, peer support services, art therapist etc using recovery principles and psycho-education about accepting illness and need for treatment   2) encourage to cooperate with behavior plan  3) encourage to attend to ADLs like taking showers and wearing clean clothes   4) Encourage to attend groups   5) see how she does on off unit privileges and encourage to go off unit with staff escort  and expectations discussed with her and she did verbalize an understanding, respiratory has decided she does not need any  portable oxygen   Safety  1) Safety/communication plan established targeting dynamic risk factors above    Discharge Plan back to the [facility] with act services and withdraw the [facility] referral due to improvement made so far  Counseling / Coordination of Care    Total floor / unit time spent today 15 minutes  Greater than 50% of total time was spent with the patient and / or family counseling and / or coordination of care  A description of the counseling / coordination of care  Patient's Rights, confidentiality and exceptions to confidentiality, use of automated medical record, [facility] staff access to medical record, and consent to treatment reviewed      Ervin Little MD

## 2020-01-15 NOTE — PROGRESS NOTES
01/15/20 1130   Activity/Group Checklist   Group   ([facility] )   Attendance Did not attend   Attendance Duration (min) Greater than 60   Affect/Mood IRMA

## 2020-01-16 NOTE — PROGRESS NOTES
01/16/20 0800   Team Meeting   Meeting Type Daily Rounds   Team Members Present   Team Members Present Physician;Nurse; Other (Discipline and Name)   Physician Team Member Dr Abad Julio Team Member Priyank Perez RN   Other (Discipline and Name) Khai Batista LCSW     Patient attended [facility] and 3-11 groups  She did report an episode of feeling "short of breath" last evening  Slept

## 2020-01-16 NOTE — PROGRESS NOTES
Progress Note - Roselia Woody 1955, 60 y o  female MRN: [id number]    Unit/Bed#: [facility] 110-02 Encounter: [id number]    Primary Care Provider: Deeanna Apley, PA-C   Date and time admitted to hospital: 7/23/2019  5:30 PM        * Schizoaffective disorder, bipolar type [facility])  Assessment & Plan  Psychiatry Progress Note  Patient is again suspicious of the staff time bring with her a oxygen concentrator as she smell something and she was not too convinced when I reminded her no one is going to time for with her device  She is now planning to take a shower today as she knows that she is supposed to take 2 showers a week  She claims that she is making it a point to attend more groups and feels proud that she is making it more groups especially IMR groups  She is now happy that she is taken off the waiting list for [facility] and again waiting for [address] [place] [address] to come and interview her  She is no longer vebalizing that she has  cancer or dying from terrible illnesses  She has not been she talking about any micro chip  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 even  She still has a tendency to complain about swallowing difficulties and breathing difficulty and some other psychosomatic symptoms off and on and has been eating most of her meals so far  I did remind her again  that she needs to keep up with her hygiene like taking showers twice a week and attending 50% of groups to be able to return back to the  personal care home and she has agreed to comply     Current medications:    Current Facility-Administered Medications:     acetaminophen (TYLENOL) tablet 650 mg, 650 mg, Oral, Q6H PRN, Sandhya Bolaños MD    albuterol (PROVENTIL HFA,VENTOLIN HFA) inhaler 2 puff, 2 puff, Inhalation, Q4H PRN, Sandhya Bolaños MD, 2 puff at 10/11/19 0424    aluminum-magnesium hydroxide-simethicone (MYLANTA) 200-200-20 mg/5 mL oral suspension 15 mL, 15 mL, Oral, Q4H PRN, Sandhya Bolaños MD    ammonium lactate (LAC-HYDRIN) 12 % lotion 1 application, 1 application, Topical, BID PRN, Ivonne Nevarez MD    benzonatate (TESSALON PERLES) capsule 100 mg, 100 mg, Oral, TID PRN, Ivonne Nevarez MD    benztropine (COGENTIN) injection 1 mg, 1 mg, Intramuscular, Q8H PRN, Ivonne Nevarez MD    carbamide peroxide (DEBROX) 6 5 % otic solution 5 drop, 5 drop, Left Ear, BID, Rona Foster MD, 5 drop at 01/15/20 2145    cloZAPine (CLOZARIL) tablet 25 mg, 25 mg, Oral, BID, Ivonne Nevarez MD, 25 mg at 01/15/20 2100    cloZAPine (CLOZARIL) tablet 50 mg, 50 mg, Oral, BID, Ivonne Nevarez MD, 50 mg at 01/15/20 2100    EPINEPHrine PF (ADRENALIN) 1 mg/mL injection 0 15 mg, 0 15 mg, Intramuscular, Once PRN, Ivonne Nevarez MD    fluticasone-vilanterol (BREO ELLIPTA) 200-25 MCG/INH inhaler 1 puff, 1 puff, Inhalation, Daily, Ivonne Nevarez MD, 1 puff at 01/16/20 0908    ketotifen (ZADITOR) 0 025 % ophthalmic solution 1 drop, 1 drop, Right Eye, BID PRN, Ivonne Nevarez MD    levothyroxine tablet 125 mcg, 125 mcg, Oral, Early Morning, Ivonne Nevarez MD, 125 mcg at 01/16/20 0615    magnesium hydroxide (MILK OF MAGNESIA) 400 mg/5 mL oral suspension 30 mL, 30 mL, Oral, Daily PRN, Ivonne Nevarez MD    montelukast (SINGULAIR) tablet 10 mg, 10 mg, Oral, HS, Ivonne Nevarez MD, 10 mg at 12/24/19 2109    OLANZapine (ZyPREXA) IM injection 5 mg, 5 mg, Intramuscular, Q8H PRN, Ivonne Nevarez MD    OLANZapine (ZyPREXA) tablet 5 mg, 5 mg, Oral, Q8H PRN, Ivonne Nevarez MD    ondansetron (ZOFRAN-ODT) dispersible tablet 4 mg, 4 mg, Oral, Q6H PRN, Ivonne Nevarez MD, 4 mg at 12/09/19 1757    pantoprazole (PROTONIX) EC tablet 40 mg, 40 mg, Oral, Early Morning, Ivonne Nevarez MD, 40 mg at 01/16/20 0615    polyethylene glycol (MIRALAX) packet 17 g, 17 g, Oral, Daily PRN, Ivonne Nevarez MD    polyvinyl alcohol (LIQUIFILM TEARS) 1 4 % ophthalmic solution 1 drop, 1 drop, Both Eyes, Q3H PRN, Ivonne Nevarez MD    sertraline (ZOLOFT) tablet 150 mg, 150 mg, Oral, Daily, Ivonne Nevarez MD, 150 mg at 01/16/20 0908   sucralfate (CARAFATE) oral suspension 1,000 mg, 1,000 mg, Oral, BID, Cat Torres MD, 1,000 mg at 01/16/20 0615    theophylline (JEF-24) 24 hr capsule 200 mg, 200 mg, Oral, Daily, Sandhya Bolaños MD, 200 mg at 01/16/20 0908    tiotropium (SPIRIVA) capsule for inhaler 18 mcg, 18 mcg, Inhalation, Daily, Sandhya Bolaños MD, 18 mcg at 01/16/20 0908    traZODone (DESYREL) tablet 25 mg, 25 mg, Oral, HS PRN, Sandhya Bolaños MD  Justification if on more than two antipsychotics:  Only on his clozapine  Side effects if any:  None    Risks , benefits, side effects and precautions of medications discussed with patient and reminded patient to let us know any problems with medications     Objective:     Vital Signs:  Vitals:    01/15/20 2100 01/16/20 0500 01/16/20 0700 01/16/20 0705   BP:   125/71 125/61   BP Location:   Left arm Left arm   Pulse:   91 87   Resp:   19 19   Temp:   97 6 °F (36 4 °C)    TempSrc:   Temporal    SpO2: 91% 95% 94%    Weight:       Height:         Appearance:  age appropriate, casually dressed and overweight older than stated age, appears better groomed with uncombed hair somewhat disheveled today   Behavior:  evasive and guarded with no psychosomatic complaints, friendly and pleasant and polite   Speech:  normal pitch and normal volume but circumstantial and tangential with stilted speech off and on   Mood:  anxious and dysthymic   Affect:  mood-congruent, elated and entitled anxious and irritated and sad at times   Thought Process:  goal directed and illogical slightly pressured and tangential and talks as if in a court of law   Thought Content:   Still admits to  occasional psychosomatic symptoms like having breathing difficulty and swallowing difficulty but has been eating well   Not voicing any suspicion about staff tampering with her inhalant or spirometer lately  Still not happy she does not need portable oxygen now and is able to go to groups without getting short of breath    No current suicidal homicidal thoughts intent or plans verbalized  Perceptual Disturbances: None and does not appear responding to internal stimuli    Risk Potential: Tendency to not care for herself    Sensorium:  person, place, time/date, situation, day of week, month of year and time   Cognition:  grossly intact with no deficits in recent or remote memory or immediate recall   Consciousness:  alert and awake    Attention: Intact concentration and attention span   Intellect: Considered to be at least of average intelligence   Insight:  limited but improving   Judgment: Improving slowly      Motor Activity: no abnormal movements         Recent Labs:  Results Reviewed     None          I/O Past 24 hours:  No intake/output data recorded  No intake/output data recorded  Assessment / Plan:     Schizoaffective disorder, bipolar type ([facility] [address] )      Reason for continued inpatient care:  Because of underlying paranoia and inability to care for herself on her own and multiple somatic complaints  Acceptance by patient:  Accepting  Alissa De Anda in recovery:  About living in same personal care home at the [place] [facility] once she feels better  Understanding of medications :  Has some understanding  Involved in reintegration process: On off unit privileges now  Trusting in relatoinship with psychiatrist:  Trusting    Recommended Treatment:    Medication changes:  1) none today  Non-pharmacological treatments  1) continue with  individual therapy group therapy, milieu therapy and occupational therapy and milieu therapy involving multidisciplinary team approach with psychotherapist, case management, nursing, peer support services, art therapist etc using recovery principles and psycho-education about accepting illness and need for treatment     2) encourage to cooperate with behavior plan  3) encourage to attend to ADLs like taking showers and wearing clean clothes   4) Encourage to attend groups   5) see how she does on off unit privileges and encourage to go off unit with staff escort  and expectations discussed with her and she did verbalize an understanding, respiratory has decided she does not need any  portable oxygen   Safety  1) Safety/communication plan established targeting dynamic risk factors above  Discharge Plan back to the [facility] with act services and withdraw the [facility] referral due to improvement made so far  Counseling / Coordination of Care    Total floor / unit time spent today 15 minutes  Greater than 50% of total time was spent with the patient and / or family counseling and / or coordination of care  A description of the counseling / coordination of care  Patient's Rights, confidentiality and exceptions to confidentiality, use of automated medical record, [facility] staff access to medical record, and consent to treatment reviewed      Sindy Day MD

## 2020-01-16 NOTE — ASSESSMENT & PLAN NOTE
Psychiatry Progress Note  Patient is again suspicious of the staff time bring with her a oxygen concentrator as she smell something and she was not too convinced when I reminded her no one is going to time for with her device  She is now planning to take a shower today as she knows that she is supposed to take 2 showers a week  She claims that she is making it a point to attend more groups and feels proud that she is making it more groups especially IMR groups  She is now happy that she is taken off the waiting list for [facility] and again waiting [facility] come and interview her  She is no longer vebalizing that she has  cancer or dying from terrible illnesses  She has not been she talking about any micro chip  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 even  She still has a tendency to complain about swallowing difficulties and breathing difficulty and some other psychosomatic symptoms off and on and has been eating most of her meals so far  I did remind her again  that she needs to keep up with her hygiene like taking showers twice a week and attending 50% of groups to be able to return back to the  personal care home and she has agreed to comply     Current medications:    Current Facility-Administered Medications:     acetaminophen (TYLENOL) tablet 650 mg, 650 mg, Oral, Q6H PRN, Jeet Levine MD    albuterol (PROVENTIL HFA,VENTOLIN HFA) inhaler 2 puff, 2 puff, Inhalation, Q4H PRN, Jeet Levine MD, 2 puff at 10/11/19 0424    aluminum-magnesium hydroxide-simethicone (MYLANTA) 200-200-20 mg/5 mL oral suspension 15 mL, 15 mL, Oral, Q4H PRN, Jeet Levine MD    ammonium lactate (LAC-HYDRIN) 12 % lotion 1 application, 1 application, Topical, BID PRN, Jeet Levine MD    benzonatate (TESSALON PERLES) capsule 100 mg, 100 mg, Oral, TID PRN, Jeet Levine MD    benztropine (COGENTIN) injection 1 mg, 1 mg, Intramuscular, Q8H PRN, Jeet Levine MD    carbamide peroxide (DEBROX) 6 5 % otic solution 5 drop, 5 drop, Left Ear, BID, Rona Brewster MD, 5 drop at 01/15/20 2145    cloZAPine (CLOZARIL) tablet 25 mg, 25 mg, Oral, BID, Vincent Olivares MD, 25 mg at 01/15/20 2100    cloZAPine (CLOZARIL) tablet 50 mg, 50 mg, Oral, BID, Vincent Olivares MD, 50 mg at 01/15/20 2100    EPINEPHrine PF (ADRENALIN) 1 mg/mL injection 0 15 mg, 0 15 mg, Intramuscular, Once PRN, Vincent Olivares MD    fluticasone-vilanterol (BREO ELLIPTA) 200-25 MCG/INH inhaler 1 puff, 1 puff, Inhalation, Daily, Vincent Olivares MD, 1 puff at 01/16/20 0908    ketotifen (ZADITOR) 0 025 % ophthalmic solution 1 drop, 1 drop, Right Eye, BID PRN, Vincent Olivares MD    levothyroxine tablet 125 mcg, 125 mcg, Oral, Early Morning, Vincent Olivares MD, 125 mcg at 01/16/20 0615    magnesium hydroxide (MILK OF MAGNESIA) 400 mg/5 mL oral suspension 30 mL, 30 mL, Oral, Daily PRN, Vincent Olivares MD    montelukast (SINGULAIR) tablet 10 mg, 10 mg, Oral, HS, Vincent Olivares MD, 10 mg at 12/24/19 2109    OLANZapine (ZyPREXA) IM injection 5 mg, 5 mg, Intramuscular, Q8H PRN, Vincent Olivares MD    OLANZapine (ZyPREXA) tablet 5 mg, 5 mg, Oral, Q8H PRN, Vincent Olivares MD    ondansetron (ZOFRAN-ODT) dispersible tablet 4 mg, 4 mg, Oral, Q6H PRN, Vincent Olivares MD, 4 mg at 12/09/19 1757    pantoprazole (PROTONIX) EC tablet 40 mg, 40 mg, Oral, Early Morning, Vincent Olivares MD, 40 mg at 01/16/20 0615    polyethylene glycol (MIRALAX) packet 17 g, 17 g, Oral, Daily PRN, Vincent Olivares MD    polyvinyl alcohol (LIQUIFILM TEARS) 1 4 % ophthalmic solution 1 drop, 1 drop, Both Eyes, Q3H PRN, Vincent Olivares MD    sertraline (ZOLOFT) tablet 150 mg, 150 mg, Oral, Daily, Vincent Olivares MD, 150 mg at 01/16/20 0908    sucralfate (CARAFATE) oral suspension 1,000 mg, 1,000 mg, Oral, BID, Cat Torres MD, 1,000 mg at 01/16/20 0615    theophylline (JEF-24) 24 hr capsule 200 mg, 200 mg, Oral, Daily, Vincent Olivares MD, 200 mg at 01/16/20 0908    tiotropium (SPIRIVA) capsule for inhaler 18 mcg, 18 mcg, Inhalation, Daily, Aneta Gonzales Prince Karrie MD, 18 mcg at 01/16/20 0908    traZODone (DESYREL) tablet 25 mg, 25 mg, Oral, HS PRN, Jeet Levine MD  Justification if on more than two antipsychotics:  Only on his clozapine  Side effects if any:  None    Risks , benefits, side effects and precautions of medications discussed with patient and reminded patient to let us know any problems with medications     Objective:     Vital Signs:  Vitals:    01/15/20 2100 01/16/20 0500 01/16/20 0700 01/16/20 0705   BP:   125/71 125/61   BP Location:   Left arm Left arm   Pulse:   91 87   Resp:   19 19   Temp:   97 6 °F (36 4 °C)    TempSrc:   Temporal    SpO2: 91% 95% 94%    Weight:       Height:         Appearance:  age appropriate, casually dressed and overweight older than stated age, appears better groomed with uncombed hair somewhat disheveled today   Behavior:  evasive and guarded with no psychosomatic complaints, friendly and pleasant and polite   Speech:  normal pitch and normal volume but circumstantial and tangential with stilted speech off and on   Mood:  anxious and dysthymic   Affect:  mood-congruent, elated and entitled anxious and irritated and sad at times   Thought Process:  goal directed and illogical slightly pressured and tangential and talks as if in a court of law   Thought Content:   Still admits to  occasional psychosomatic symptoms like having breathing difficulty and swallowing difficulty but has been eating well   Not voicing any suspicion about staff tampering with her inhalant or spirometer lately  Still not happy she does not need portable oxygen now and is able to go to groups without getting short of breath  No current suicidal homicidal thoughts intent or plans verbalized     Perceptual Disturbances: None and does not appear responding to internal stimuli    Risk Potential: Tendency to not care for herself    Sensorium:  person, place, time/date, situation, day of week, month of year and time   Cognition:  grossly intact with no deficits in recent or remote memory or immediate recall   Consciousness:  alert and awake    Attention: Intact concentration and attention span   Intellect: Considered to be at least of average intelligence   Insight:  limited but improving   Judgment: Improving slowly      Motor Activity: no abnormal movements         Recent Labs:  Results Reviewed     None          I/O Past 24 hours:  No intake/output data recorded  No intake/output data recorded  Assessment / Plan:     Schizoaffective disorder, bipolar type ([facility] [address] )      Reason for continued inpatient care:  Because of underlying paranoia and inability to care for herself on her own and multiple somatic complaints  Acceptance by patient:  Accepting  Teena Bashir in recovery:  About living in same personal care home at the [facility] once she feels better  Understanding of medications :  Has some understanding  Involved in reintegration process: On off unit privileges now  Trusting in relatoinship with psychiatrist:  Trusting    Recommended Treatment:    Medication changes:  1) none today  Non-pharmacological treatments  1) continue with  individual therapy group therapy, milieu therapy and occupational therapy and milieu therapy involving multidisciplinary team approach with psychotherapist, case management, nursing, peer support services, art therapist etc using recovery principles and psycho-education about accepting illness and need for treatment   2) encourage to cooperate with behavior plan  3) encourage to attend to ADLs like taking showers and wearing clean clothes   4) Encourage to attend groups   5) see how she does on off unit privileges and encourage to go off unit with staff escort  and expectations discussed with her and she did verbalize an understanding, respiratory has decided she does not need any  portable oxygen   Safety  1) Safety/communication plan established targeting dynamic risk factors above    [facility] and withdraw the [facility] FAC[facility] referral due to improvement made so far  Counseling / Coordination of Care    Total floor / unit time spent today 15 minutes  Greater than 50% of total time was spent with the patient and / or family counseling and / or coordination of care  A description of the counseling / coordination of care  Patient's Rights, confidentiality and exceptions to confidentiality, use of automated medical record, [facility] staff access to medical record, and consent to treatment reviewed      Mynor Terry MD

## 2020-01-16 NOTE — NURSING NOTE
During med pass this am, Leviyola Bueno is reporting that she smell a strong smell of incense  She though somebody put something on her " bubble water" referring to her oxygen therapy  No smell felt by this nurse  Pt reassured

## 2020-01-16 NOTE — PLAN OF CARE
Problem: Alteration in Thoughts and Perception  Goal: Agree to be compliant with medication regime, as prescribed and report medication side effects  Description  Interventions:  - Offer appropriate PRN medication and supervise ingestion; conduct aims, as needed   Outcome: Progressing     Problem: Alteration in Thoughts and Perception  Goal: Recognize dysfunctional thoughts, communicate reality-based thoughts at the time of discharge  Description  Interventions:  - Provide medication and psycho-education to assist patient in compliance and developing insight into his/her illness   Outcome: Not Progressing   Patient continues to be isolative and withdrawn  She is somatic regarding her oxygen, stating that a staff member added "something that smells" to the humidification bottle  She states "It tasts like incense"  Attended and participated in [address] group this shift  She continues to lack motivation and insight  Remains in bed sleeping on and off today  Continue to monitor

## 2020-01-16 NOTE — PROGRESS NOTES
01/16/20 1100   Activity/Group Checklist   Group Other (Comment)  (IMR group)   Attendance Attended   Attendance Duration (min) 46-60   Interactions Interacted appropriately   Affect/Mood Appropriate   Goals Achieved Identified feelings; Able to listen to others; Able to self-disclose     Patient was able to attend group today  Patient completed DERS (emotion regulation survey)  Patient  received information about the importance of recognizing one's emotions and ways to deal with challenging emotions   Patient participated and was respectful of peers

## 2020-01-17 NOTE — PROGRESS NOTES
01/17/20 0900   Team Meeting   Meeting Type Daily Rounds   Team Members Present   Team Members Present Physician;Nurse; Other (Discipline and Name)   Physician Team Member Dr Shannan Higginbotham Team Member Bonilla Domingo RN   Other (Discipline and Name) Rhianna Beckman LCSW     Attended groups without issues  Slept

## 2020-01-17 NOTE — PLAN OF CARE
Problem: Alteration in Thoughts and Perception  Goal: Treatment Goal: Gain control of psychotic behaviors/thinking, reduce/eliminate presenting symptoms and demonstrate improved reality functioning upon discharge  Outcome: Progressing  Goal: Verbalize thoughts and feelings  Description  Interventions:  - Promote a nonjudgmental and trusting relationship with the patient through active listening and therapeutic communication  - Assess patient's level of functioning, behavior and potential for risk  - Engage patient in 1 on 1 interactions for a minimum of 15 minutes each session  - Encourage patient to express fears, feelings, frustrations, and discuss symptoms    - [place] patient to reality, help patient recognize reality-based thinking   - Administer medications as ordered and assess for potential side effects  - Provide the patient education related to the signs and symptoms of the illness and desired effects of prescribed medications  Outcome: Progressing  Goal: Agree to be compliant with medication regime, as prescribed and report medication side effects  Description  Interventions:  - Offer appropriate PRN medication and supervise ingestion; conduct aims, as needed   Outcome: Progressing  Goal: Attend and participate in unit activities, including therapeutic, recreational, and educational groups  Description  Interventions:  - Provide therapeutic and educational activities daily, encourage attendance and participation, and document same in the medical record     CERTIFIED PEER SPECIALIST INTERVENTIONS:    Complete peer assessment with patient to assess their needs and identify their goals to complete while in the recovery program as well as once discharged into the community  Patient will complete WRAP Plan, Crisis Plan and [facility] [address]  Patient will attend 50% of groups offered on the unit  Patient will complete a goal card weekly      Outcome: Progressing     Problem: Ineffective Coping  Goal: Participates in unit activities  Description  Interventions:  - Provide therapeutic environment   - Provide required programming   - Redirect inappropriate behaviors   Outcome: Progressing     Problem: Alteration in Thoughts and Perception  Goal: Recognize dysfunctional thoughts, communicate reality-based thoughts at the time of discharge  Description  Interventions:  - Provide medication and psycho-education to assist patient in compliance and developing insight into his/her illness   Outcome: Not Progressing  Goal: Complete daily ADLs, including personal hygiene independently, as able  Description  Interventions:  - Observe, teach, and assist patient with ADLS  - Monitor and promote a balance of rest/activity, with adequate nutrition and elimination   Outcome: Not Progressing     Problem: Ineffective Coping  Goal: Identifies ineffective coping skills  Outcome: Not Progressing  Goal: Identifies healthy coping skills  Outcome: Not Progressing  Goal: Demonstrates healthy coping skills  Outcome: Not Progressing     Patient is alert, pleasant and cooperative  Med and meal compliant  Isolative to room  Out of room to attend women's group and evening group  Social with peers during group and meals  Admits to feeling depressed and rates depression and anxiety at 5:10  Denies thoughts to harm self or others  No somatic complaints voiced    Will continue to monitor progress in recovery program

## 2020-01-17 NOTE — PROGRESS NOTES
Pharmacy Clozapine Monitoring Progress Note     Lizbeth Jhaveri is receiving a total daily dose of 150 mg of clozapine divided as 75mg at 1600 and 75mg at 2000  Pharmacist Recommendations Based on Assessment and Plan (below):    1  Patient [address] monitoring frequency has changed from weekly to every 2 weeks per Clozapine REMS  2  Patient is Clozapine-REMS eligible, ANC was updated, with every 2 week monitoring frequency and the next [address] is due on 1/31/20  Assessment/Plan:    Phase of Treatment:     Current phase of treatment is initation/titration  Patient Clozapine REMS Eligibility:     Patient is eligible to receive clozapine and the [address] monitoring frequency is weekly per clozapine REMS  The patient's latest [address] value has been updated into the Clozapine-REMS program          ANC and Clozapine Level (if available)     The most recent [address] was   Lab Results   Component Value Date    NEUTROABS 3 70 01/17/2020    and the next lab is due on 1/31/20  Last Clozapine level (if available):    0   Lab Value Date/Time    CLOZAPINE 205 (L) 01/03/2020 0547     0   Lab Value Date/Time    NCLOZIP 119 01/03/2020 0547           Monitoring Parameters for Clozapine:     Clozapine Adverse Effect Suggested Monitoring Patient's Current Status    Agranulocytosis ANC baseline and then repeat weekly for first 6 months and every 2 weeks for the second 6 months  Maintenance after one year of therapy every month  The most recent [address] was   Lab Results   Component Value Date    NEUTROABS 3 70 01/17/2020       This ANC is considered normal range (ANC >/= 1500/mcL)  Continue current treatment and monitoring course  Respiratory Depression Please ensure patient is not on concomitant respiratory lowering medications such as benzodiazepines, sedative hypnotics (eg  zolpidem) This patient is not on respiratory depression lowering medications   Myocarditis Baseline and weekly EKG, CRP, BNP, and troponin    Weekly symptomatic complaints of fatigue, dyspnea, tachycardia, chest pain, palpitations, and fever for the first 4 weeks  Last EKG Results on  1/10/20 showed: "Normal sinus rhythm  Left axis deviation  Inferior infarct (cited on or before 10-NOLA-2020)  Abnormal ECG  When compared with ECG of 11-DEC-2019 20:41,  No significant change was found  Confirmed by Erich Noble ([id number]) on 1/13/2020 9:02:19 AM"      Last QTC value was:  0   Lab Value Date/Time    QTCINT 457 01/10/2020 2031       Last BNP was: No results found for: NTBNP    Last Troponin was:  0   Lab Value Date/Time    TROPONINI <0 01 05/01/2019 1318    TROPONINI <0 04 05/10/2015 1948        Orthostatic hypotension/  bradycardia Blood pressure and vital signs      Monitor blood pressure and orthostatic hypotension at least twice daily upon initiation and continue to follow at least once daily afterwards  Patient's last recorded vital signs:       /63 (BP Location: Left arm)   Pulse 77   Temp 98 2 °F (36 8 °C) (Temporal)   Resp 17   Ht 5' 4" (1 626 m)   Wt 66 3 kg (146 lb 3 2 oz)   SpO2 95%   BMI 25 10 kg/m²             Constipation (bowel obstruction due to high anticholinergic clozapine burden) Assess at baseline and weekly during first four months of therapy  Docusate 100mg BID and Miralax 17 grams daily recommended initially  Prophylactic bowel regimen ordered and includes: sucralfate   Sialorrhea Assess at baseline and weekly during first four months of therapy  May be managed using sublingual anticholinergic preparations (atropine 1% eye drops)  If needed atropine 1% eye drops can be used sublingually or glycopyrrolate or terazosin if patient non-allergic  Please note that per current REMS protocol the provider can submit a treatment rationale that justifies clozapine treatment even if patient parameters are outside of REMS recommendations        The Clozapine REMS is an FDA-mandated program implemented by the manufacturers of clozapine  ([employer]  com/CpmgClozapineUI/home  u)  Pharmacy will continue to follow patient with team, thank you    Electronically signed by: Mar Reyes, PharmD, [address], Clinical Pharmacist - Psychiatry

## 2020-01-17 NOTE — ASSESSMENT & PLAN NOTE
Psychiatry Progress Note  Patient did not express any suspicion about the staff tampering with her oxygen concentrator or her inhalant yesterday  She was supposed to take a shower yesterday but comes up with all kinds of excuses about the water pressure and what a temperature for not taking a shower yesterday  However she is proud of the fact that she is attending more groups especially IMR groups  She is now happy that she is taken off the waiting list for [facility] is anticipating the [address] [place] [address] to come and interview her  She is no longer vebalizing that she has  cancer or dying from terrible illnesses lately  She has not been talking about any micro chip or implant 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   She still has a tendency to complain about swallowing difficulties and breathing difficulty and some other psychosomatic symptoms off and on but has been eating most of her meals so far  I did remind her again  that she needs to keep up with her hygiene like taking showers twice a week and attending 50% of groups to be able to return back to the  personal care home and she has agreed to comply again even though she appears to be passive-aggressive    She still exhibits some stilted speech and  with uncombed hair and poorly groomed appearance   Current medications:    Current Facility-Administered Medications:     acetaminophen (TYLENOL) tablet 650 mg, 650 mg, Oral, Q6H PRN, Jaz Beasley MD    albuterol (PROVENTIL HFA,VENTOLIN HFA) inhaler 2 puff, 2 puff, Inhalation, Q4H PRN, Jaz Beasley MD, 2 puff at 10/11/19 0424    aluminum-magnesium hydroxide-simethicone (MYLANTA) 200-200-20 mg/5 mL oral suspension 15 mL, 15 mL, Oral, Q4H PRN, Jaz Beasley MD    ammonium lactate (LAC-HYDRIN) 12 % lotion 1 application, 1 application, Topical, BID PRN, Jaz Beasley MD    benzonatate (TESSALON PERLES) capsule 100 mg, 100 mg, Oral, TID PRN, Jaz Beasley MD    benztropine (COGENTIN) injection 1 mg, 1 mg, Intramuscular, Q8H PRN, Teri Huggins MD    carbamide peroxide [facility]) 6 5 % otic solution 5 drop, 5 drop, Left Ear, BID, Rona Guillen MD, 5 drop at 01/17/20 1852    cloZAPine (CLOZARIL) tablet 25 mg, 25 mg, Oral, BID, Teri Huggins MD, 25 mg at 01/16/20 2106    cloZAPine (CLOZARIL) tablet 50 mg, 50 mg, Oral, BID, Teri Huggins MD, 50 mg at 01/16/20 2105    EPINEPHrine PF (ADRENALIN) 1 mg/mL injection 0 15 mg, 0 15 mg, Intramuscular, Once PRN, Teri Huggins MD    fluticasone-vilanterol (BREO ELLIPTA) 200-25 MCG/INH inhaler 1 puff, 1 puff, Inhalation, Daily, Teri Huggins MD, 1 puff at 01/17/20 0823    ketotifen (ZADITOR) 0 025 % ophthalmic solution 1 drop, 1 drop, Right Eye, BID PRN, Teri Huggins MD    levothyroxine tablet 125 mcg, 125 mcg, Oral, Early Morning, Teri Huggins MD, 125 mcg at 01/17/20 2482    magnesium hydroxide (MILK OF MAGNESIA) 400 mg/5 mL oral suspension 30 mL, 30 mL, Oral, Daily PRN, Teri Huggins MD    montelukast (SINGULAIR) tablet 10 mg, 10 mg, Oral, HS, Teri Huggins MD, 10 mg at 01/16/20 2106    OLANZapine (ZyPREXA) IM injection 5 mg, 5 mg, Intramuscular, Q8H PRN, Teri Huggins MD    OLANZapine (ZyPREXA) tablet 5 mg, 5 mg, Oral, Q8H PRN, Teri Huggins MD    ondansetron (ZOFRAN-ODT) dispersible tablet 4 mg, 4 mg, Oral, Q6H PRN, Teri Huggins MD, 4 mg at 12/09/19 1757    pantoprazole (PROTONIX) EC tablet 40 mg, 40 mg, Oral, Early Morning, Teri Huggins MD, 40 mg at 01/17/20 6993    polyethylene glycol (MIRALAX) packet 17 g, 17 g, Oral, Daily PRN, Teri Huggins MD    polyvinyl alcohol (LIQUIFILM TEARS) 1 4 % ophthalmic solution 1 drop, 1 drop, Both Eyes, Q3H PRN, Teri Huggins MD    sertraline (ZOLOFT) tablet 150 mg, 150 mg, Oral, Daily, Teri Huggins MD, 150 mg at 01/17/20 4986    sucralfate (CARAFATE) oral suspension 1,000 mg, 1,000 mg, Oral, BID, Cat Torres MD, 1,000 mg at 01/17/20 0621    theophylline (JEF-24) 24 hr capsule 200 mg, 200 mg, Oral, Daily, Teri Huggins MD, 200 mg at 01/17/20 0823    tiotropium [facility]) capsule for inhaler 18 mcg, 18 mcg, Inhalation, Daily, Allie Guzman MD, 18 mcg at 01/17/20 8528    traZODone (DESYREL) tablet 25 mg, 25 mg, Oral, HS PRN, Allie Guzman MD  Justification if on more than two antipsychotics:  Only on his clozapine  Side effects if any:  None    Risks , benefits, side effects and precautions of medications discussed with patient and reminded patient to let us know any problems with medications     Objective:     Vital Signs:  Vitals:    01/16/20 2030 01/16/20 2139 01/17/20 0600 01/17/20 0730   BP: 91/54   101/63   BP Location: Right arm   Left arm   Pulse: 97 89  77   Resp: 16   17   Temp: 98 °F (36 7 °C)   98 2 °F (36 8 °C)   TempSrc: Temporal   Temporal   SpO2: 91% 94% 95%    Weight:       Height:         Appearance:  age appropriate, casually dressed and overweight older than stated age, appears worked groomed with uncombed hair somewhat disheveled today found sitting on bed wearing same clothing from yesterday   Behavior:  evasive and guarded with no psychosomatic complaints, friendly and pleasant and polite   Speech:  normal pitch and normal volume but circumstantial and tangential with stilted speech off and on   Mood:  anxious and dysthymic   Affect:  mood-congruent, elated and entitled anxious and irritated and sad at times   Thought Process:  goal directed and illogical slightly pressured and tangential and talks as if in a court of law   Thought Content:   Still admits to occasional psychosomatic symptoms about not being able to breathe or swallow properly  Not voicing any suspicion about staff tampering with her inhalant or spirometer l today  Still and happy about the decision that she does not need any portable oxygen now  No current suicidal homicidal thoughts intent or plans verbalized    No other overt delusional believes reported or elicited   Perceptual Disturbances: None and does not appear responding to internal stimuli Risk Potential: Tendency to not care for herself    Sensorium:  person, place, time/date, situation, day of week, month of year and time   Cognition:  grossly intact with no deficits in recent or remote memory or immediate recall   Consciousness:  alert and awake    Attention: Intact concentration and attention span   Intellect: Considered to be at least of average intelligence   Insight:  limited but improving   Judgment: Improving slowly      Motor Activity: no abnormal movements         Recent Labs:  Results Reviewed     None          I/O Past 24 hours:  No intake/output data recorded  No intake/output data recorded  Assessment / Plan:     Schizoaffective disorder, bipolar type ([facility] [address] )      Reason for continued inpatient care:  Because of underlying paranoia and inability to care for herself on her own and multiple somatic complaints  Acceptance by patient:  Accepting  Robert Yadav in recovery:  About living in same personal care home at the [facility] once she feels better  Understanding of medications :  Has some understanding  Involved in reintegration process: On off unit privileges now  Trusting in relatoinship with psychiatrist:  Trusting    Recommended Treatment:    Medication changes:  1) none today  Non-pharmacological treatments  1) continue with  individual therapy group therapy, milieu therapy and occupational therapy and milieu therapy involving multidisciplinary team approach with psychotherapist, case management, nursing, peer support services, art therapist etc using recovery principles and psycho-education about accepting illness and need for treatment     2) encourage to cooperate with behavior plan  3) encourage to attend to ADLs like taking showers and wearing clean clothes   4) Encourage to attend groups   5) see how she does on off unit privileges and encourage to go off unit with staff escort  and expectations discussed with her and she did verbalize an understanding, respiratory has decided she does not need any  portable oxygen   Safety  1) Safety/communication plan established targeting dynamic risk factors above  Discharge Plan back to the [facility] with act services and withdraw the [facility] referral due to improvement made so far  Counseling / Coordination of Care    Total floor / unit time spent today 15 minutes  Greater than 50% of total time was spent with the patient and / or family counseling and / or coordination of care  A description of the counseling / coordination of care  Patient's Rights, confidentiality and exceptions to confidentiality, use of automated medical record, [facility] staff access to medical record, and consent to treatment reviewed      Christian Bennett MD

## 2020-01-17 NOTE — PLAN OF CARE
Problem: Alteration in Thoughts and Perception  Goal: Agree to be compliant with medication regime, as prescribed and report medication side effects  Description  Interventions:  - Offer appropriate PRN medication and supervise ingestion; conduct aims, as needed   Outcome: Progressing  Goal: Attend and participate in unit activities, including therapeutic, recreational, and educational groups  Description  Interventions:  - Provide therapeutic and educational activities daily, encourage attendance and participation, and document same in the medical record     CERTIFIED PEER SPECIALIST INTERVENTIONS:    Complete peer assessment with patient to assess their needs and identify their goals to complete while in the recovery program as well as once discharged into the community  Patient will complete WRAP Plan, Crisis Plan and 5 Life Domains  Patient will attend 50% of groups offered on the unit  Patient will complete a goal card weekly  Outcome: Abbey Menendez has been intermittently visible on the unit in between select groups, no complaints of distress at this time  Intermittently visible in between select groups  Ate 100% for breakfast and 25% of her lunch  Med and meal compliant without prompting  Still withdrawn and isolative to her room  Disheveled in appearance but is pleasant during interaction with this writer  Will continue to monitor

## 2020-01-17 NOTE — PLAN OF CARE
Problem: Alteration in Thoughts and Perception  Goal: Verbalize thoughts and feelings  Description  Interventions:  - Promote a nonjudgmental and trusting relationship with the patient through active listening and therapeutic communication  - Assess patient's level of functioning, behavior and potential for risk  - Engage patient in 1 on 1 interactions for a minimum of 15 minutes each session  - Encourage patient to express fears, feelings, frustrations, and discuss symptoms    - [place] patient to reality, help patient recognize reality-based thinking   - Administer medications as ordered and assess for potential side effects  - Provide the patient education related to the signs and symptoms of the illness and desired effects of prescribed medications  Outcome: Progressing  Goal: Attend and participate in unit activities, including therapeutic, recreational, and educational groups  Description  Interventions:  - Provide therapeutic and educational activities daily, encourage attendance and participation, and document same in the medical record     CERTIFIED PEER SPECIALIST INTERVENTIONS:    Complete peer assessment with patient to assess their needs and identify their goals to complete while in the recovery program as well as once discharged into the community  Patient will complete WRAP Plan, Crisis Plan and 5 Life Domains  Patient will attend 50% of groups offered on the unit  Patient will complete a goal card weekly  Outcome: Progressing     Problem: Depression  Goal: Refrain from isolation  Description  Interventions:  - Develop a trusting relationship   - Encourage socialization   Outcome: Progressing     Problem: Anxiety  Goal: Anxiety is at manageable level  Description  Interventions:  - Assess and monitor patient's anxiety level     - Monitor for signs and symptoms of anxiety both physical and emotional (heart palpitations, chest pain, shortness of breath, headaches, nausea, feeling jumpy, restlessness, irritable, apprehensive)  - Collaborate with interdisciplinary team and initiate plan and interventions as ordered  - [place] patient to unit/surroundings  - Explain treatment plan  - Encourage participation in care  - Encourage verbalization of concerns/fears  - Identify coping mechanisms  - Assist in developing anxiety-reducing skills  - Administer/offer alternative therapies  - Limit or eliminate stimulants  Outcome: Progressing     Problem: Alteration in Orientation  Goal: Interact with staff daily  Description  Interventions:  - Assess and re-assess patient's level of orientation  - Engage patient in 1 on 1 interactions, daily, for a minimum of 15 minutes   - Establish rapport/trust with patient   Outcome: Progressing     Problem: Alteration in Thoughts and Perception  Goal: Complete daily ADLs, including personal hygiene independently, as able  Description  Interventions:  - Observe, teach, and assist patient with ADLS  - Monitor and promote a balance of rest/activity, with adequate nutrition and elimination   Outcome: Not Progressing     Mariana Hernandes was in the dining room at the beginning of the shift  Social with staff and select peers  Smiling and pleasant  Able to make needs known  No somatic complaints  Came for medication on her own  Ate 100% of her meal and drank all of her Ensure  No complaint of problems swallowing  Sat up for a while after eating  Lays in bed at times  Disheveled appearance  Still has not showered  Attended evening group  She did her puzzle book in the dining room  Took HS medication with prompting  Ate snack  Oxygen saturation 92% on room air prior to oxygen 1 liter via nasal cannula applied  Continue to monitor  Precautions maintained

## 2020-01-17 NOTE — PROGRESS NOTES
Progress Note - Violetta Villasenor 1955, 60 y o  female MRN: [id number]    [facility] Encounter: [id number]    Primary Care Provider: Trish Alfaro PA-C   Date and time admitted to hospital: 7/23/2019  5:30 PM        * Schizoaffective disorder, bipolar type [facility])  Assessment & Plan  Psychiatry Progress Note  Patient did not express any suspicion about the staff tampering with her oxygen concentrator or her inhalant yesterday  She was supposed to take a shower yesterday but comes up with all kinds of excuses about the water pressure and what a temperature for not taking a shower yesterday  However she is proud of the fact that she is attending more groups especially IMR groups  She is now happy that she is taken off the waiting list for [facility] is anticipating the [address] [place] [address] to come and interview her  She is no longer vebalizing that she has  cancer or dying from terrible illnesses lately  She has not been talking about any micro chip or implant 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   She still has a tendency to complain about swallowing difficulties and breathing difficulty and some other psychosomatic symptoms off and on but has been eating most of her meals so far  I did remind her again  that she needs to keep up with her hygiene like taking showers twice a week and attending 50% of groups to be able to return back to the  personal care home and she has agreed to comply again even though she appears to be passive-aggressive    She still exhibits some stilted speech and  with uncombed hair and poorly groomed appearance   Current medications:    Current Facility-Administered Medications:     acetaminophen (TYLENOL) tablet 650 mg, 650 mg, Oral, Q6H PRN, Manuel Copeland MD    albuterol (PROVENTIL HFA,VENTOLIN HFA) inhaler 2 puff, 2 puff, Inhalation, Q4H PRN, Manuel Copeland MD, 2 puff at 10/11/19 0424    aluminum-magnesium hydroxide-simethicone (MYLANTA) 200-200-20 mg/5 mL oral suspension 15 mL, 15 mL, Oral, Q4H PRN, Prakash Brewster MD    ammonium lactate (LAC-HYDRIN) 12 % lotion 1 application, 1 application, Topical, BID PRN, Prakash Brewster MD    benzonatate (TESSALON PERLES) capsule 100 mg, 100 mg, Oral, TID PRN, Prakash Brewster MD    benztropine (COGENTIN) injection 1 mg, 1 mg, Intramuscular, Q8H PRN, Prakash Brewster MD    carbamide peroxide (DEBROX) 6 5 % otic solution 5 drop, 5 drop, Left Ear, BID, Rona Phillips MD, 5 drop at 01/17/20 2520    cloZAPine (CLOZARIL) tablet 25 mg, 25 mg, Oral, BID, Prakash Brewster MD, 25 mg at 01/16/20 2106    cloZAPine (CLOZARIL) tablet 50 mg, 50 mg, Oral, BID, Prakash Brewster MD, 50 mg at 01/16/20 2105    EPINEPHrine PF (ADRENALIN) 1 mg/mL injection 0 15 mg, 0 15 mg, Intramuscular, Once PRN, Prakash Brewster MD    fluticasone-vilanterol (BREO ELLIPTA) 200-25 MCG/INH inhaler 1 puff, 1 puff, Inhalation, Daily, Prakash Brewster MD, 1 puff at 01/17/20 0823    ketotifen (ZADITOR) 0 025 % ophthalmic solution 1 drop, 1 drop, Right Eye, BID PRN, Prakash Brewster MD    levothyroxine tablet 125 mcg, 125 mcg, Oral, Early Morning, Prakash Brewster MD, 125 mcg at 01/17/20 2558    magnesium hydroxide (MILK OF MAGNESIA) 400 mg/5 mL oral suspension 30 mL, 30 mL, Oral, Daily PRN, Prakash Brewster MD    montelukast (SINGULAIR) tablet 10 mg, 10 mg, Oral, HS, Prakash Brewster MD, 10 mg at 01/16/20 2106    OLANZapine (ZyPREXA) IM injection 5 mg, 5 mg, Intramuscular, Q8H PRN, Prakash Brewster MD    OLANZapine (ZyPREXA) tablet 5 mg, 5 mg, Oral, Q8H PRN, Prakash Brewster MD    ondansetron (ZOFRAN-ODT) dispersible tablet 4 mg, 4 mg, Oral, Q6H PRN, Prakash Brewster MD, 4 mg at 12/09/19 1757    pantoprazole (PROTONIX) EC tablet 40 mg, 40 mg, Oral, Early Morning, Prakash Brewster MD, 40 mg at 01/17/20 7074    polyethylene glycol (MIRALAX) packet 17 g, 17 g, Oral, Daily PRN, Prakash Brewster MD    polyvinyl alcohol (LIQUIFILM TEARS) 1 4 % ophthalmic solution 1 drop, 1 drop, Both Eyes, Q3H PRN, Prakash Brewster MD    sertraline (ZOLOFT) tablet 150 mg, 150 mg, Oral, Daily, Sindy Day MD, 150 mg at 01/17/20 2713    sucralfate (CARAFATE) oral suspension 1,000 mg, 1,000 mg, Oral, BID, Saurav Berman MD, 1,000 mg at 01/17/20 0190    theophylline (JEF-24) 24 hr capsule 200 mg, 200 mg, Oral, Daily, Sindy Day MD, 200 mg at 01/17/20 5165    tiotropium [facility]) capsule for inhaler 18 mcg, 18 mcg, Inhalation, Daily, Sindy Day MD, 18 mcg at 01/17/20 8039    traZODone (DESYREL) tablet 25 mg, 25 mg, Oral, HS PRN, Sindy Day MD  Justification if on more than two antipsychotics:  Only on his clozapine  Side effects if any:  None    Risks , benefits, side effects and precautions of medications discussed with patient and reminded patient to let us know any problems with medications     Objective:     Vital Signs:  Vitals:    01/16/20 2030 01/16/20 2139 01/17/20 0600 01/17/20 0730   BP: 91/54   101/63   BP Location: Right arm   Left arm   Pulse: 97 89  77   Resp: 16   17   Temp: 98 °F (36 7 °C)   98 2 °F (36 8 °C)   TempSrc: Temporal   Temporal   SpO2: 91% 94% 95%    Weight:       Height:         Appearance:  age appropriate, casually dressed and overweight older than stated age, appears worked groomed with uncombed hair somewhat disheveled today found sitting on bed wearing same clothing from yesterday   Behavior:  evasive and guarded with no psychosomatic complaints, friendly and pleasant and polite   Speech:  normal pitch and normal volume but circumstantial and tangential with stilted speech off and on   Mood:  anxious and dysthymic   Affect:  mood-congruent, elated and entitled anxious and irritated and sad at times   Thought Process:  goal directed and illogical slightly pressured and tangential and talks as if in a court of law   Thought Content:   Still admits to occasional psychosomatic symptoms about not being able to breathe or swallow properly    Not voicing any suspicion about staff tampering with her inhalant or spirometer l today  Still and happy about the decision that she does not need any portable oxygen now  No current suicidal homicidal thoughts intent or plans verbalized  No other overt delusional believes reported or elicited   Perceptual Disturbances: None and does not appear responding to internal stimuli    Risk Potential: Tendency to not care for herself    Sensorium:  person, place, time/date, situation, day of week, month of year and time   Cognition:  grossly intact with no deficits in recent or remote memory or immediate recall   Consciousness:  alert and awake    Attention: Intact concentration and attention span   Intellect: Considered to be at least of average intelligence   Insight:  limited but improving   Judgment: Improving slowly      Motor Activity: no abnormal movements         Recent Labs:  Results Reviewed     None          I/O Past 24 hours:  No intake/output data recorded  No intake/output data recorded  Assessment / Plan:     [facility]ffective [facility]      Reason for continued inpatient care:  Because of underlying paranoia and inability to care for herself on her own and multiple somatic complaints  Acceptance by patient:  Accepting  Eric Cevallos in recovery:  About living in same personal care home at the [place] once she feels better  Understanding of medications :  Has some understanding  Involved in reintegration process: On off unit privileges now  Trusting in relatoinship with psychiatrist:  Trusting    Recommended Treatment:    Medication changes:  1) none today  Non-pharmacological treatments  1) continue with  individual therapy group therapy, milieu therapy and occupational therapy and milieu therapy involving multidisciplinary team approach with psychotherapist, case management, nursing, peer support services, art therapist etc using recovery principles and psycho-education about accepting illness and need for treatment     2) encourage to cooperate with behavior plan  3) encourage to attend to ADLs like taking showers and wearing clean clothes   4) Encourage to attend groups   5) see how she does on off unit privileges and encourage to go off unit with staff escort  and expectations discussed with her and she did verbalize an understanding, respiratory has decided she does not need any  portable oxygen   Safety  1) Safety/communication plan established targeting dynamic risk factors above  Discharge Plan back to the [facility] with act services and withdraw the [facility] referral due to improvement made so far  Counseling / Coordination of Care    Total floor / unit time spent today 15 minutes  Greater than 50% of total time was spent with the patient and / or family counseling and / or coordination of care  A description of the counseling / coordination of care  Patient's Rights, confidentiality and exceptions to confidentiality, use of automated medical record, Jeb Titus adeline staff access to medical record, and consent to treatment reviewed      Alirio Frazier MD

## 2020-01-18 NOTE — PROGRESS NOTES
Progress Note - Behavioral Health   Violetta Cage 60 y o  female MRN: [id number]  Unit/Bed#: [facility] 110-02 Encounter: [id number]    Assessment/Plan   Principal Problem:    Schizoaffective disorder, bipolar type ([facility] )  Active Problems:    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[facility] )    Left hip pain    Acquired hypothyroidism    Gastroesophageal reflux disease without esophagitis    At risk for aspiration    Ear ache      Subjective:  Patient scant in conversation  Does say her depression comes and goes in waves  Reported she had recent increase in Zoloft dosing  States since then she is actually more tired  Otherwise she is denying psychotic symptoms  Denied hallucinations  No signs of dylan  No agitation  Denied excessive anxiety  Are reports of somatic delusional thinking at times  Did complain of drooling from Clozaril  Otherwise denied additional somatic complaints or potential serious side effects        Current Medications:  Current Facility-Administered Medications   Medication Dose Route Frequency    acetaminophen (TYLENOL) tablet 650 mg  650 mg Oral Q6H PRN    albuterol (PROVENTIL HFA,VENTOLIN HFA) inhaler 2 puff  2 puff Inhalation Q4H PRN    aluminum-magnesium hydroxide-simethicone (MYLANTA) 200-200-20 mg/5 mL oral suspension 15 mL  15 mL Oral Q4H PRN    ammonium lactate (LAC-HYDRIN) 12 % lotion 1 application  1 application Topical BID PRN    benzonatate (TESSALON PERLES) capsule 100 mg  100 mg Oral TID PRN    benztropine (COGENTIN) injection 1 mg  1 mg Intramuscular Q8H PRN    carbamide peroxide (DEBROX) 6 5 % otic solution 5 drop  5 drop Left Ear BID    cloZAPine (CLOZARIL) tablet 25 mg  25 mg Oral BID    cloZAPine (CLOZARIL) tablet 50 mg  50 mg Oral BID    EPINEPHrine PF (ADRENALIN) 1 mg/mL injection 0 15 mg  0 15 mg Intramuscular Once PRN    fluticasone-vilanterol (BREO ELLIPTA) 200-25 MCG/INH inhaler 1 puff  1 puff Inhalation Daily    ketotifen (ZADITOR) 0 025 % ophthalmic solution 1 drop  1 drop Right Eye BID PRN    levothyroxine tablet 125 mcg  125 mcg Oral Early Morning    magnesium hydroxide (MILK OF MAGNESIA) 400 mg/5 mL oral suspension 30 mL  30 mL Oral Daily PRN    montelukast (SINGULAIR) tablet 10 mg  10 mg Oral HS    OLANZapine (ZyPREXA) IM injection 5 mg  5 mg Intramuscular Q8H PRN    OLANZapine (ZyPREXA) tablet 5 mg  5 mg Oral Q8H PRN    ondansetron (ZOFRAN-ODT) dispersible tablet 4 mg  4 mg Oral Q6H PRN    pantoprazole (PROTONIX) EC tablet 40 mg  40 mg Oral Early Morning    polyethylene glycol (MIRALAX) packet 17 g  17 g Oral Daily PRN    polyvinyl alcohol (LIQUIFILM TEARS) 1 4 % ophthalmic solution 1 drop  1 drop Both Eyes Q3H PRN    sertraline (ZOLOFT) tablet 150 mg  150 mg Oral Daily    sucralfate (CARAFATE) oral suspension 1,000 mg  1,000 mg Oral BID    theophylline (JEF-24) 24 hr capsule 200 mg  200 mg Oral Daily    tiotropium (SPIRIVA) capsule for inhaler 18 mcg  18 mcg Inhalation Daily    traZODone (DESYREL) tablet 25 mg  25 mg Oral HS PRN       Behavioral Health Medications: all current active meds have been reviewed and continue current psychiatric medications  Vitals:  Vitals:    01/18/20 0732   BP: 106/72   Pulse: 82   Resp:    Temp:    SpO2:        Laboratory results:    I have personally reviewed all pertinent laboratory/tests results    Most Recent Labs:   Lab Results   Component Value Date    WBC 8 00 01/17/2020    RBC 4 64 01/17/2020    HGB 14 2 01/17/2020    HCT 42 6 01/17/2020     01/17/2020    RDW 15 4 (H) 01/17/2020    NEUTROABS 3 70 01/17/2020    SODIUM 140 10/25/2019    K 4 1 10/25/2019     10/25/2019    CO2 29 10/25/2019    BUN 16 10/25/2019    CREATININE 0 75 10/25/2019    GLUC 93 10/25/2019    GLUF 93 10/25/2019    CALCIUM 9 1 10/25/2019    AST 23 10/25/2019    ALT 25 10/25/2019    ALKPHOS 104 10/25/2019    TP 6 3 10/25/2019    ALB 3 4 10/25/2019    TBILI 0 30 10/25/2019    CHOLESTEROL 210 (H) 10/25/2019    HDL 38 (L) 10/25/2019    TRIG 134 10/25/2019    LDLCALC 145 (H) 10/25/2019    [place] 172 10/25/2019    LITHIUM <0 2 (L) 05/01/2019    AMMONIA 13 02/22/2016    GXV1JVWIEXCG 0 985 08/21/2019    FREET4 1 4 05/12/2015    RPR Non-Reactive 05/02/2019    HGBA1C 5 5 01/17/2019     01/17/2019       Psychiatric Review of Systems:  Behavior over the last 24 hours:  unchanged  Sleep: normal  Appetite: normal  Medication side effects: No  ROS: no complaints, all others negative    Mental Status Evaluation:  Appearance:  casually dressed and older than stated age   Behavior:  guarded   Speech:  soft   Mood:  Less depressed and anxious   Affect:  mood-congruent   Language sparse   Thought Process:  goal directed and linear   Thought Content:  Somatic delusions   Perceptual Disturbances: None   Risk Potential: Denied SI/HI  Potential for aggression no   Sensorium:  person, place and time/date   Cognition:  grossly intact   Consciousness:  alert and awake    Recent and Remote Memory fair   Attention: attention span appeared shorter than expected for age   Insight:  Partial   Judgment: Partial   Gait/Station: normal gait/station and normal balance   Motor Activity: no abnormal movements     Progress Toward Goals:  Unchanged    Recommended Treatment: Continue with group therapy, milieu therapy and occupational therapy  1  Continue current medications    Risks, benefits and possible side effects of Medications:   Risks, benefits, and possible side effects of medications explained to patient and patient verbalizes understanding        Pilar King PA-C

## 2020-01-18 NOTE — PLAN OF CARE
Problem: RESPIRATORY - ADULT  Goal: Achieves optimal ventilation and oxygenation  Description  INTERVENTIONS:  - Assess for changes in respiratory status  - Assess for changes in mentation and behavior  - Position to facilitate oxygenation and minimize respiratory effort  - Oxygen administration by appropriate delivery method based on oxygen saturation (per order) or ABGs  - Initiate smoking cessation education as indicated  - Encourage broncho-pulmonary hygiene including cough, deep breathe, Incentive Spirometry  - Assess the need for suctioning and aspirate as needed  - Assess and instruct to report SOB or any respiratory difficulty  - Respiratory Therapy support as indicated  Outcome: Progressing     Problem: SLEEP DISTURBANCE  Goal: Will exhibit normal sleeping pattern  Description  Interventions:  -  Assess the patients sleep pattern, noting recent changes  - Administer medication as ordered  - Decrease environmental stimuli, including noise, as appropriate during the night  - Encourage the patient to actively participate in unit groups and or exercise during the day to enhance ability to achieve adequate sleep at night  - Assess the patient, in the morning, encouraging a description of sleep experience  Outcome: Progressing    Received patient in bed, asleep at shift onset  Maintains 1L humidified oxygen during sleep  No respiratory distress  Slept throughout the night  No behaviors observed

## 2020-01-18 NOTE — PLAN OF CARE
Problem: Alteration in Thoughts and Perception  Goal: Agree to be compliant with medication regime, as prescribed and report medication side effects  Description  Interventions:  - Offer appropriate PRN medication and supervise ingestion; conduct aims, as needed   Outcome: Progressing  Goal: Complete daily ADLs, including personal hygiene independently, as able  Description  Interventions:  - Observe, teach, and assist patient with ADLS  - Monitor and promote a balance of rest/activity, with adequate nutrition and elimination   Outcome: Not Progressing     Problem: Risk for Self Injury/Neglect  Goal: Complete daily ADLs, including personal hygiene independently, as able  Description  Interventions:  - Observe, teach, and assist patient with ADLS  - Monitor and promote a balance of rest/activity, with adequate nutrition and elimination  Outcome: Not Progressing     Problem: Alteration in Orientation  Goal: Interact with staff daily  Description  Interventions:  - Assess and re-assess patient's level of orientation  - Engage patient in 1 on 1 interactions, daily, for a minimum of 15 minutes   - Establish rapport/trust with patient   Outcome: Praneeth Virk has been isolative to her room in between scheduled medications and meals  She is able to make her needs known to staff as needed  Ate yogurt and oatmeal for lunch  Interacts appropriately with staff  No somatic complaints reported at this time  Disheveled in appearance and blunted in affect but brightens on approach  Behaviors controlled  Will continue to monitor for changes in behavior

## 2020-01-19 NOTE — PLAN OF CARE
Problem: Alteration in Thoughts and Perception  Goal: Agree to be compliant with medication regime, as prescribed and report medication side effects  Description  Interventions:  - Offer appropriate PRN medication and supervise ingestion; conduct aims, as needed   Outcome: Progressing  Goal: Attend and participate in unit activities, including therapeutic, recreational, and educational groups  Description  Interventions:  - Provide therapeutic and educational activities daily, encourage attendance and participation, and document same in the medical record     CERTIFIED PEER SPECIALIST INTERVENTIONS:    Complete peer assessment with patient to assess their needs and identify their goals to complete while in the recovery program as well as once discharged into the community  Patient will complete WRAP Plan, Crisis Plan and [facility] [employer]  Patient will attend 50% of groups offered on the unit  Patient will complete a goal card weekly  Outcome: Progressing  Goal: Complete daily ADLs, including personal hygiene independently, as able  Description  Interventions:  - Observe, teach, and assist patient with ADLS  - Monitor and promote a balance of rest/activity, with adequate nutrition and elimination   Outcome: Jannette Warner has been isolative to her room in between scheduled medications and meals  She is able to make her needs known to staff as needed  Ate yogurt and oatmeal for breakfast and ate 2 donuts for nutrition group instead of eating lunch as she stated she was full  Interacts appropriately with staff  No somatic complaints reported at this time  Disheveled in appearance and blunted in affect but brightens on approach  Behaviors controlled  Will continue to monitor for changes in behavior

## 2020-01-19 NOTE — PROGRESS NOTES
[facility] did poorly w/the Incentive Spirometer this evening, struggling to reach 1000ml  Admits hasn't been using it this past week  Did have an HS snack, came for HS medicine except the Singulair  Wearing now her QHS humidified nasal O2 @ 1L for bed

## 2020-01-19 NOTE — PROGRESS NOTES
Progress Note - Behavioral Health   Jose F Mahoney 60 y o  female MRN: [id number]  Unit/Bed#: [facility] 110-02 Encounter: [id number]    Assessment/Plan   Principal Problem:    Schizoaffective disorder, bipolar type ([facility] [address] )  Active Problems:    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[facility] [address] )    Left hip pain    Acquired hypothyroidism    Gastroesophageal reflux disease without esophagitis    At risk for aspiration    Ear ache      Subjective:  Patient scant in conversation  Denies all symptoms this morning  Appeared constricted and blunted  Complained of drooling yesterday  Denied psychosis, delusional material, does not appear manic  Are reports of somatic delusions at times  Medication compliant  Besides drooling appears to be tolerating medications well without serious side effects        Current Medications:  Current Facility-Administered Medications   Medication Dose Route Frequency    acetaminophen (TYLENOL) tablet 650 mg  650 mg Oral Q6H PRN    albuterol (PROVENTIL HFA,VENTOLIN HFA) inhaler 2 puff  2 puff Inhalation Q4H PRN    aluminum-magnesium hydroxide-simethicone (MYLANTA) 200-200-20 mg/5 mL oral suspension 15 mL  15 mL Oral Q4H PRN    ammonium lactate (LAC-HYDRIN) 12 % lotion 1 application  1 application Topical BID PRN    benzonatate (TESSALON PERLES) capsule 100 mg  100 mg Oral TID PRN    benztropine (COGENTIN) injection 1 mg  1 mg Intramuscular Q8H PRN    carbamide peroxide (DEBROX) 6 5 % otic solution 5 drop  5 drop Left Ear BID    cloZAPine (CLOZARIL) tablet 25 mg  25 mg Oral BID    cloZAPine (CLOZARIL) tablet 50 mg  50 mg Oral BID    EPINEPHrine PF (ADRENALIN) 1 mg/mL injection 0 15 mg  0 15 mg Intramuscular Once PRN    fluticasone-vilanterol (BREO ELLIPTA) 200-25 MCG/INH inhaler 1 puff  1 puff Inhalation Daily    ketotifen (ZADITOR) 0 025 % ophthalmic solution 1 drop  1 drop Right Eye BID PRN    levothyroxine tablet 125 mcg  125 mcg Oral Early Morning    magnesium hydroxide (MILK OF MAGNESIA) 400 mg/5 mL oral suspension 30 mL  30 mL Oral Daily PRN    montelukast (SINGULAIR) tablet 10 mg  10 mg Oral HS    OLANZapine (ZyPREXA) IM injection 5 mg  5 mg Intramuscular Q8H PRN    OLANZapine (ZyPREXA) tablet 5 mg  5 mg Oral Q8H PRN    ondansetron (ZOFRAN-ODT) dispersible tablet 4 mg  4 mg Oral Q6H PRN    pantoprazole (PROTONIX) EC tablet 40 mg  40 mg Oral Early Morning    polyethylene glycol (MIRALAX) packet 17 g  17 g Oral Daily PRN    polyvinyl alcohol (LIQUIFILM TEARS) 1 4 % ophthalmic solution 1 drop  1 drop Both Eyes Q3H PRN    sertraline (ZOLOFT) tablet 150 mg  150 mg Oral Daily    sucralfate (CARAFATE) oral suspension 1,000 mg  1,000 mg Oral BID    theophylline (JEF-24) 24 hr capsule 200 mg  200 mg Oral Daily    tiotropium (SPIRIVA) capsule for inhaler 18 mcg  18 mcg Inhalation Daily    traZODone (DESYREL) tablet 25 mg  25 mg Oral HS PRN       Behavioral Health Medications: all current active meds have been reviewed and continue current psychiatric medications  Vitals:  Vitals:    01/19/20 0732   BP: 95/52   Pulse: 64   Resp:    Temp:    SpO2:        Laboratory results:    I have personally reviewed all pertinent laboratory/tests results    Most Recent Labs:   Lab Results   Component Value Date    WBC 8 00 01/17/2020    RBC 4 64 01/17/2020    HGB 14 2 01/17/2020    HCT 42 6 01/17/2020     01/17/2020    RDW 15 4 (H) 01/17/2020    NEUTROABS 3 70 01/17/2020    SODIUM 140 10/25/2019    K 4 1 10/25/2019     10/25/2019    CO2 29 10/25/2019    BUN 16 10/25/2019    CREATININE 0 75 10/25/2019    GLUC 93 10/25/2019    GLUF 93 10/25/2019    CALCIUM 9 1 10/25/2019    AST 23 10/25/2019    ALT 25 10/25/2019    ALKPHOS 104 10/25/2019    TP 6 3 10/25/2019    ALB 3 4 10/25/2019    TBILI 0 30 10/25/2019    CHOLESTEROL 210 (H) 10/25/2019    HDL 38 (L) 10/25/2019    TRIG 134 10/25/2019    LDLCALC 145 (H) 10/25/2019    [place] 172 10/25/2019    LITHIUM <0 2 (L) 05/01/2019    AMMONIA 13 02/22/2016    [id number] 0 985 08/21/2019    FREET4 1 4 05/12/2015    RPR Non-Reactive 05/02/2019    HGBA1C 5 5 01/17/2019     01/17/2019       Psychiatric Review of Systems:  Behavior over the last 24 hours:  unchanged  Sleep: hypersomnia  Appetite: normal  Medication side effects: Yes drooling  ROS: no complaints, all others negative    Mental Status Evaluation:  Appearance:  casually dressed and older than stated age   Behavior:  guarded   Speech:  soft   Mood:  euthymic   Affect:  constricted   Language sparse   Thought Process:  goal directed and Linear   Thought Content:  Somatic delusions   Perceptual Disturbances: None   Risk Potential: Denied SI/HI  Potential for aggression no   Sensorium:  person, place and time/date   Cognition:  grossly intact   Consciousness:  sedated    Recent and Remote Memory fair   Attention: attention span appeared shorter than expected for age   Insight:  Partial   Judgment: Partial   Gait/Station: normal gait/station and normal balance   Motor Activity: no abnormal movements     Progress Toward Goals: unchanged    Recommended Treatment: Continue with group therapy, milieu therapy and occupational therapy  1   Continue current medications    Risks, benefits and possible side effects of Medications:   Risks, benefits, and possible side effects of medications explained to patient and patient verbalizes understanding        Henry Alegre PA-C

## 2020-01-19 NOTE — PLAN OF CARE
Problem: Alteration in Thoughts and Perception  Goal: Agree to be compliant with medication regime, as prescribed and report medication side effects  Description  Interventions:  - Offer appropriate PRN medication and supervise ingestion; conduct aims, as needed   Outcome: Progressing  Goal: Attend and participate in unit activities, including therapeutic, recreational, and educational groups  Description  Interventions:  - Provide therapeutic and educational activities daily, encourage attendance and participation, and document same in the medical record     CERTIFIED PEER SPECIALIST INTERVENTIONS:    Complete peer assessment with patient to assess their needs and identify their goals to complete while in the recovery program as well as once discharged into the community  Patient will complete WRAP Plan, Crisis Plan and 5 Life Domains  Patient will attend 50% of groups offered on the unit  Patient will complete a goal card weekly  Outcome: Progressing     Problem: Alteration in Thoughts and Perception  Goal: Complete daily ADLs, including personal hygiene independently, as able  Description  Interventions:  - Observe, teach, and assist patient with ADLS  - Monitor and promote a balance of rest/activity, with adequate nutrition and elimination   Outcome: Not Progressing     Problem: Depression  Goal: Refrain from isolation  Description  Interventions:  - Develop a trusting relationship   - Encourage socialization   Outcome: Not Progressing  Goal: Refrain from self-neglect  Outcome: Not Progressing     2015 Kathrine Cabello has been isolative to her room & bed, coming out for meal (ate 100% plus an Ensure), for scheduled medicines  No effort to address hygiene, clothing heaped in a disorderly mess on her bedside table w/no effort to fold or tidy up her space  Was asked why these things are not being addressed; presented could straighten/fold from her bed  Just criss   Is attending [employer], working cross word puzzles  Is pleasant w/staff, but, minimal peer interactions

## 2020-01-20 NOTE — ASSESSMENT & PLAN NOTE
Psychiatry Progress Note  Patient did take a shower today after last shower from over a week ago  She is proud of the fact that she took a shower finally as she plans to go into the community tomorrow for a shopping trip with staff escort as schedule already  She has been attending more groups including [facility] so  She is no longer vebalizing that she has  cancer or dying from terrible illnesses or about having any micro chip or implant 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   She still has a tendency to complain about swallowing difficulties and breathing difficulty and some other psychosomatic symptoms off and on but has been eating most of her meals so far and today she thought that the oxygen concentrator was beeping last night he    I did remind her again  that she needs to keep up with her hygiene like taking showers twice a week and attending 50% of groups to be able to return back to the  personal care home at [facility] and she has agreed to comply again even though she appears to be passive-aggressive    She still exhibits some stilted speech as usual   Current medications:    Current Facility-Administered Medications:     acetaminophen (TYLENOL) tablet 650 mg, 650 mg, Oral, Q6H PRN, Carissa Willis MD    albuterol (PROVENTIL HFA,VENTOLIN HFA) inhaler 2 puff, 2 puff, Inhalation, Q4H PRN, Carissa Willis MD, 2 puff at 10/11/19 0424    aluminum-magnesium hydroxide-simethicone (MYLANTA) 200-200-20 mg/5 mL oral suspension 15 mL, 15 mL, Oral, Q4H PRN, Carissa Willis MD    ammonium lactate (LAC-HYDRIN) 12 % lotion 1 application, 1 application, Topical, BID PRN, Carissa Willis MD    benzonatate (TESSALON PERLES) capsule 100 mg, 100 mg, Oral, TID PRN, Carissa Willis MD    benztropine (COGENTIN) injection 1 mg, 1 mg, Intramuscular, Q8H PRN, Carissa Willis MD    carbamide peroxide (DEBROX) 6 5 % otic solution 5 drop, 5 drop, Left Ear, BID, Rona Berg MD, 5 drop at 01/19/20 0897    cloZAPine (CLOZARIL) tablet 25 mg, 25 mg, Oral, BID, Vincent Olivares MD, 25 mg at 01/19/20 2144    cloZAPine (CLOZARIL) tablet 50 mg, 50 mg, Oral, BID, Vincent Olivares MD, 50 mg at 01/19/20 2144    EPINEPHrine PF (ADRENALIN) 1 mg/mL injection 0 15 mg, 0 15 mg, Intramuscular, Once PRN, Vincent Olivares MD    fluticasone-vilanterol (BREO ELLIPTA) 200-25 MCG/INH inhaler 1 puff, 1 puff, Inhalation, Daily, Vincent Olivares MD, 1 puff at 01/20/20 0854    ketotifen (ZADITOR) 0 025 % ophthalmic solution 1 drop, 1 drop, Right Eye, BID PRN, Vincent Olivares MD    levothyroxine tablet 125 mcg, 125 mcg, Oral, Early Morning, Vincent Olivares MD, 125 mcg at 01/20/20 0603    magnesium hydroxide (MILK OF MAGNESIA) 400 mg/5 mL oral suspension 30 mL, 30 mL, Oral, Daily PRN, Vincent Olivares MD    montelukast (SINGULAIR) tablet 10 mg, 10 mg, Oral, HS, Vincent Olivares MD, 10 mg at 01/16/20 2106    OLANZapine (ZyPREXA) IM injection 5 mg, 5 mg, Intramuscular, Q8H PRN, Vincent Olivares MD    OLANZapine (ZyPREXA) tablet 5 mg, 5 mg, Oral, Q8H PRN, Vincent Olivares MD    ondansetron (ZOFRAN-ODT) dispersible tablet 4 mg, 4 mg, Oral, Q6H PRN, Vincent Olivares MD, 4 mg at 12/09/19 1757    pantoprazole (PROTONIX) EC tablet 40 mg, 40 mg, Oral, Early Morning, Vincent Olivares MD, 40 mg at 01/20/20 0603    polyethylene glycol (MIRALAX) packet 17 g, 17 g, Oral, Daily PRN, Vincent Olivares MD    polyvinyl alcohol (LIQUIFILM TEARS) 1 4 % ophthalmic solution 1 drop, 1 drop, Both Eyes, Q3H PRN, Vincent Olivares MD    sertraline (ZOLOFT) tablet 150 mg, 150 mg, Oral, Daily, Vincent Olivares MD, 150 mg at 01/20/20 0851    sucralfate (CARAFATE) oral suspension 1,000 mg, 1,000 mg, Oral, BID, Cat Torres MD, 1,000 mg at 01/20/20 0603    theophylline (JEF-24) 24 hr capsule 200 mg, 200 mg, Oral, Daily, Vincent Olivares MD, 200 mg at 01/20/20 0851    tiotropium [facility]) capsule for inhaler 18 mcg, 18 mcg, Inhalation, Daily, Vincent Olivares MD, 18 mcg at 01/20/20 0854    traZODone (DESYREL) tablet 25 mg, 25 mg, Oral, HS PRN, Vincent Olivares, MD  Justification if on more than two antipsychotics:  Only on his clozapine  Side effects if any:  None    Risks , benefits, side effects and precautions of medications discussed with patient and reminded patient to let us know any problems with medications     Objective:     Vital Signs:  Vitals:    01/19/20 1610 01/19/20 2000 01/20/20 0615 01/20/20 0700   BP: 114/70 92/54  115/65   BP Location: Right arm Left arm  Right arm   Pulse: 92 67  81   Resp: 18 16  16   Temp: 98 6 °F (37 °C) 97 5 °F (36 4 °C)  97 7 °F (36 5 °C)   TempSrc: Temporal Temporal     SpO2: 93% 93% 96%    Weight:       Height:         Appearance:  age appropriate, casually dressed and overweight older than stated age, appears better groomed with combed hair wearing clean clothes and an T staff assisting her combing her hair    Behavior:  evasive and guarded with no psychosomatic complaints, friendly and pleasant and polite   Speech:  normal pitch and normal volume but circumstantial and tangential with stilted speech    Mood:  anxious and dysthymic   Affect:  mood-congruent, elated and entitled anxious and irritated and sad at times   Thought Process:  goal directed and illogical slightly pressured and tangential and talks as if in a court of law   Thought Content:   Still admits to occasional psychosomatic symptoms and some suspicion about staff tampering with the oxygen concentrator or her inhalant which comes and goes  Still unhappy about the decision that she does not need any portable oxygen now  No current suicidal homicidal thoughts intent or plans verbalized    No other overt delusional beliefs reported or elicited   Perceptual Disturbances: None and does not appear responding to internal stimuli    Risk Potential: Tendency to not care for herself    Sensorium:  person, place, time/date, situation, day of week, month of year and time   Cognition:  grossly intact with no deficits in recent or remote memory or immediate recall Consciousness:  alert and awake    Attention: Intact concentration and attention span   Intellect: Considered to be at least of average intelligence   Insight:  limited but improving   Judgment: Improving slowly      Motor Activity: no abnormal movements         Recent Labs:  Results Reviewed     None          I/O Past 24 hours:  No intake/output data recorded  No intake/output data recorded  Assessment / Plan:     Schizoaffective disorder, bipolar type ([facility] [address] )      Reason for continued inpatient care:  Because of underlying paranoia and inability to care for herself on her own and multiple somatic complaints  Acceptance by patient:  Accepting  Clarisa Mcgregor in recovery:  About living in same personal care home at the [place] once she feels better  Understanding of medications :  Has some understanding  Involved in reintegration process: On off unit privileges now  Trusting in relatoinship with psychiatrist:  Trusting    Recommended Treatment:    Medication changes:  1) none today  Non-pharmacological treatments  1) continue with  individual therapy group therapy, milieu therapy and occupational therapy and milieu therapy involving multidisciplinary team approach with psychotherapist, case management, nursing, peer support services, art therapist etc using recovery principles and psycho-education about accepting illness and need for treatment   2) encourage to cooperate with behavior plan  3) encourage to attend to ADLs like taking showers and wearing clean clothes   4) Encourage to attend groups   5) see how she does on off unit privileges and encourage to go off unit with staff escort  and expectations discussed with her and she did verbalize an understanding, respiratory has decided she does not need any  portable oxygen   Safety  1) Safety/communication plan established targeting dynamic risk factors above    Discharge Plan back to the [facility] with act services and withdraw the [facility] referral due to improvement made so far  Counseling / Coordination of Care    Total floor / unit time spent today 15 minutes  Greater than 50% of total time was spent with the patient and / or family counseling and / or coordination of care  A description of the counseling / coordination of care  Patient's Rights, confidentiality and exceptions to confidentiality, use of automated medical record, Jeb Patrick staff access to medical record, and consent to treatment reviewed      Mynor Terry MD

## 2020-01-20 NOTE — PROGRESS NOTES
Progress Note - Violetta Pinpointe 1955, 60 y o  female MRN: [id number]    [facility] Encounter: [id number]    Primary Care Provider: Praveen Barrios PA-C   Date and time admitted to hospital: 7/23/2019  5:30 PM        * Schizoaffective disorder, bipolar type [facility])  Assessment & Plan  Psychiatry Progress Note  Patient did take a shower today after last shower from over a week ago  She is proud of the fact that she took a shower finally as she plans to go into the community tomorrow for a shopping trip with staff escort as schedule already  She has been attending more groups including [facility] so  She is no longer vebalizing that she has  cancer or dying from terrible illnesses or about having any micro chip or implant 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   She still has a tendency to complain about swallowing difficulties and breathing difficulty and some other psychosomatic symptoms off and on but has been eating most of her meals so far and today she thought that the oxygen concentrator was beeping last night he    I did remind her again  that she needs to keep up with her hygiene like taking showers twice a week and attending 50% of groups to be able to return back to the  personal care home at [facility] and she has agreed to comply again even though she appears to be passive-aggressive    She still exhibits some stilted speech as usual   Current medications:    Current Facility-Administered Medications:     acetaminophen (TYLENOL) tablet 650 mg, 650 mg, Oral, Q6H PRN, Felisa Florence MD    albuterol (PROVENTIL HFA,VENTOLIN HFA) inhaler 2 puff, 2 puff, Inhalation, Q4H PRN, Felisa Florence MD, 2 puff at 10/11/19 0424    aluminum-magnesium hydroxide-simethicone (MYLANTA) 200-200-20 mg/5 mL oral suspension 15 mL, 15 mL, Oral, Q4H PRN, Felisa Florence MD    ammonium lactate (LAC-HYDRIN) 12 % lotion 1 application, 1 application, Topical, BID PRN, Felisa Florence MD    benzonatate (TESSALON PERLES) capsule 100 mg, 100 mg, Oral, TID PRN, Dalton Garner MD    benztropine (COGENTIN) injection 1 mg, 1 mg, Intramuscular, Q8H PRN, Dalton Garner MD    carbamide peroxide (DEBROX) 6 5 % otic solution 5 drop, 5 drop, Left Ear, BID, Rona Correia MD, 5 drop at 01/19/20 0839    cloZAPine (CLOZARIL) tablet 25 mg, 25 mg, Oral, BID, Dalton Garner MD, 25 mg at 01/19/20 2144    cloZAPine (CLOZARIL) tablet 50 mg, 50 mg, Oral, BID, Dalton Garner MD, 50 mg at 01/19/20 2144    EPINEPHrine PF (ADRENALIN) 1 mg/mL injection 0 15 mg, 0 15 mg, Intramuscular, Once PRN, Dalton Garner MD    fluticasone-vilanterol (BREO ELLIPTA) 200-25 MCG/INH inhaler 1 puff, 1 puff, Inhalation, Daily, Dalton Garner MD, 1 puff at 01/20/20 0854    ketotifen (ZADITOR) 0 025 % ophthalmic solution 1 drop, 1 drop, Right Eye, BID PRN, Dalton Garner MD    levothyroxine tablet 125 mcg, 125 mcg, Oral, Early Morning, Dalton Garner MD, 125 mcg at 01/20/20 0603    magnesium hydroxide (MILK OF MAGNESIA) 400 mg/5 mL oral suspension 30 mL, 30 mL, Oral, Daily PRN, Dalton Garner MD    montelukast (SINGULAIR) tablet 10 mg, 10 mg, Oral, HS, Dalton Garner MD, 10 mg at 01/16/20 2106    OLANZapine (ZyPREXA) IM injection 5 mg, 5 mg, Intramuscular, Q8H PRN, Dalton Garner MD    OLANZapine (ZyPREXA) tablet 5 mg, 5 mg, Oral, Q8H PRN, Dalton Garner MD    ondansetron (ZOFRAN-ODT) dispersible tablet 4 mg, 4 mg, Oral, Q6H PRN, Dalton Garner MD, 4 mg at 12/09/19 1757    pantoprazole (PROTONIX) EC tablet 40 mg, 40 mg, Oral, Early Morning, Dalton Garner MD, 40 mg at 01/20/20 0603    polyethylene glycol (MIRALAX) packet 17 g, 17 g, Oral, Daily PRN, Dalton Garner MD    polyvinyl alcohol (LIQUIFILM TEARS) 1 4 % ophthalmic solution 1 drop, 1 drop, Both Eyes, Q3H PRN, Dalton Garner MD    sertraline (ZOLOFT) tablet 150 mg, 150 mg, Oral, Daily, Dalton Garner MD, 150 mg at 01/20/20 0851    sucralfate (CARAFATE) oral suspension 1,000 mg, 1,000 mg, Oral, BID, Cat Torres MD, 1,000 mg at 01/20/20 0603    theophylline (JEF-24) 24 hr capsule 200 mg, 200 mg, Oral, Daily, Amina Aparicio MD, 200 mg at 01/20/20 0851    tiotropium [facility]) capsule for inhaler 18 mcg, 18 mcg, Inhalation, Daily, Amina Aparicio MD, 18 mcg at 01/20/20 0854    traZODone (DESYREL) tablet 25 mg, 25 mg, Oral, HS PRN, Amina Aparicio MD  Justification if on more than two antipsychotics:  Only on his clozapine  Side effects if any:  None    Risks , benefits, side effects and precautions of medications discussed with patient and reminded patient to let us know any problems with medications     Objective:     Vital Signs:  Vitals:    01/19/20 1610 01/19/20 2000 01/20/20 0615 01/20/20 0700   BP: 114/70 92/54  115/65   BP Location: Right arm Left arm  Right arm   Pulse: 92 67  81   Resp: 18 16  16   Temp: 98 6 °F (37 °C) 97 5 °F (36 4 °C)  97 7 °F (36 5 °C)   TempSrc: Temporal Temporal     SpO2: 93% 93% 96%    Weight:       Height:         Appearance:  age appropriate, casually dressed and overweight older than stated age, appears better groomed with combed hair wearing clean clothes and an T staff assisting her combing her hair    Behavior:  evasive and guarded with no psychosomatic complaints, friendly and pleasant and polite   Speech:  normal pitch and normal volume but circumstantial and tangential with stilted speech    Mood:  anxious and dysthymic   Affect:  mood-congruent, elated and entitled anxious and irritated and sad at times   Thought Process:  goal directed and illogical slightly pressured and tangential and talks as if in a court of law   Thought Content:   Still admits to occasional psychosomatic symptoms and some suspicion about staff tampering with the oxygen concentrator or her inhalant which comes and goes  Still unhappy about the decision that she does not need any portable oxygen now  No current suicidal homicidal thoughts intent or plans verbalized    No other overt delusional beliefs reported or elicited   Perceptual Disturbances: None and does not appear responding to internal stimuli    Risk Potential: Tendency to not care for herself    Sensorium:  person, place, time/date, situation, day of week, month of year and time   Cognition:  grossly intact with no deficits in recent or remote memory or immediate recall   Consciousness:  alert and awake    Attention: Intact concentration and attention span   Intellect: Considered to be at least of average intelligence   Insight:  limited but improving   Judgment: Improving slowly      Motor Activity: no abnormal movements         Recent Labs:  Results Reviewed     None          I/O Past 24 hours:  No intake/output data recorded  No intake/output data recorded  Assessment / Plan:     [facility]oaffective [facility]      Reason for continued inpatient care:  Because of underlying paranoia and inability to care for herself on her own and multiple somatic complaints  Acceptance by patient:  Accepting  Ros Patel in recovery:  About living in same personal care home at [facility] once she feels better  Understanding of medications :  Has some understanding  Involved in reintegration process: On off unit privileges now  Trusting in relatoinship with psychiatrist:  Trusting    Recommended Treatment:    Medication changes:  1) none today  Non-pharmacological treatments  1) continue with  individual therapy group therapy, milieu therapy and occupational therapy and milieu therapy involving multidisciplinary team approach with psychotherapist, case management, nursing, peer support services, art therapist etc using recovery principles and psycho-education about accepting illness and need for treatment     2) encourage to cooperate with behavior plan  3) encourage to attend to ADLs like taking showers and wearing clean clothes   4) Encourage to attend groups   5) see how she does on off unit privileges and encourage to go off unit with staff escort  and expectations discussed with her and she did verbalize an understanding, respiratory has decided she does not need any  portable oxygen   Safety  1) Safety/communication plan established targeting dynamic risk factors above  Discharge Plan back to the [facility] with act services and withdraw the [facility] referral due to improvement made so far  Counseling / Coordination of Care    Total floor / unit time spent today 15 minutes  Greater than 50% of total time was spent with the patient and / or family counseling and / or coordination of care  A description of the counseling / coordination of care  Patient's Rights, confidentiality and exceptions to confidentiality, use of automated medical record, [facility]eline staff access to medical record, and consent to treatment reviewed      Meredith Wesley MD

## 2020-01-20 NOTE — NURSING NOTE
O2 concentrator  Alarm went off at approx  0500 and it was found to have the 02 at 0 liter  O2 adjusted to 1 liter as ordered  PO2 96%

## 2020-01-20 NOTE — PROGRESS NOTES
01/20/20 1100   Activity/Group Checklist   Group   (IMR/Self Esteem )   Attendance Attended   Attendance Duration (min) Greater than 60   Interactions Interacted appropriately   Affect/Mood Appropriate;Bright;Normal range   Goals Achieved Identified feelings; Discussed coping strategies; Discussed self-esteem issues; Identified resources and support systems; Able to listen to others; Able to engage in interactions; Able to self-disclose; Able to reflect/comment on own behavior;Able to manage/cope with feelings; Able to recieve feedback; Able to give feedback to another

## 2020-01-20 NOTE — PROGRESS NOTES
01/20/20 0900   Team Meeting   Meeting Type Daily Rounds   Team Members Present   Team Members Present Physician;Nurse;; Other (Discipline and Name)   Physician Team Member Dr Brisa Arana Team Member Stan Pinzon RN   Care Management Team Member Brenda Baron   Other (Discipline and Name) Damon Farr LCSW     Patient is attended select groups, otherwise isolative to her room  Her sister visited over the weekend  Slept

## 2020-01-20 NOTE — PROGRESS NOTES
BRADY GROUP NOTE     01/20/20 0900   Activity/Group Checklist   Group Community meeting   Attendance Attended   Attendance Duration (min) 0-15   Interactions Interacted appropriately   Affect/Mood Appropriate   Goals Achieved Able to listen to others; Able to engage in interactions  (Reminisced appropriately on topic with group discussion

## 2020-01-20 NOTE — PLAN OF CARE
Problem: Alteration in Thoughts and Perception  Goal: Treatment Goal: Gain control of psychotic behaviors/thinking, reduce/eliminate presenting symptoms and demonstrate improved reality functioning upon discharge  Outcome: Progressing  Goal: Verbalize thoughts and feelings  Description  Interventions:  - Promote a nonjudgmental and trusting relationship with the patient through active listening and therapeutic communication  - Assess patient's level of functioning, behavior and potential for risk  - Engage patient in 1 on 1 interactions for a minimum of 15 minutes each session  - Encourage patient to express fears, feelings, frustrations, and discuss symptoms    - [place] patient to reality, help patient recognize reality-based thinking   - Administer medications as ordered and assess for potential side effects  - Provide the patient education related to the signs and symptoms of the illness and desired effects of prescribed medications  Outcome: Progressing  Goal: Agree to be compliant with medication regime, as prescribed and report medication side effects  Description  Interventions:  - Offer appropriate PRN medication and supervise ingestion; conduct aims, as needed   Outcome: Progressing  Goal: Attend and participate in unit activities, including therapeutic, recreational, and educational groups  Description  Interventions:  - Provide therapeutic and educational activities daily, encourage attendance and participation, and document same in the medical record     CERTIFIED PEER SPECIALIST INTERVENTIONS:    Complete peer assessment with patient to assess their needs and identify their goals to complete while in the recovery program as well as once discharged into the community  Patient will complete WRAP Plan, Crisis Plan and 5 Life Domains  Patient will attend 50% of groups offered on the unit  Patient will complete a goal card weekly      Outcome: Progressing  Goal: Recognize dysfunctional thoughts, communicate reality-based thoughts at the time of discharge  Description  Interventions:  - Provide medication and psycho-education to assist patient in compliance and developing insight into his/her illness   Outcome: Progressing  Goal: Complete daily ADLs, including personal hygiene independently, as able  Description  Interventions:  - Observe, teach, and assist patient with ADLS  - Monitor and promote a balance of rest/activity, with adequate nutrition and elimination   Outcome: Progressing     Problem: Ineffective Coping  Goal: Participates in unit activities  Description  Interventions:  - Provide therapeutic environment   - Provide required programming   - Redirect inappropriate behaviors   Outcome: Progressing  Goal: Patient/Family verbalizes awareness of resources  Outcome: Progressing  Goal: Understands least restrictive measures  Description  Interventions:  - Utilize least restrictive behavior  Outcome: Progressing     Problem: Risk for Self Injury/Neglect  Goal: Treatment Goal: Remain safe during length of stay, learn and adopt new coping skills, and be free of self-injurious ideation, impulses and acts at the time of discharge  Outcome: Progressing  Goal: Verbalize thoughts and feelings  Description  Interventions:  - Assess and re-assess patient's lethality and potential for self-injury  - Engage patient in 1:1 interactions, daily, for a minimum of 15 minutes  - Encourage patient to express feelings, fears, frustrations, hopes  - Establish rapport/trust with patient   Outcome: Progressing  Goal: Refrain from harming self  Description  Interventions:  - Monitor patient closely, per order  - Develop a trusting relationship  - Supervise medication ingestion, monitor effects and side effects   Outcome: Progressing  Goal: Attend and participate in unit activities, including therapeutic, recreational, and educational groups  Description  Interventions:  - Provide therapeutic and educational activities daily, encourage attendance and participation, and document same in the medical record  - Obtain collateral information, encourage visitation and family involvement in care   Outcome: Progressing  Goal: Complete daily ADLs, including personal hygiene independently, as able  Description  Interventions:  - Observe, teach, and assist patient with ADLS  - Monitor and promote a balance of rest/activity, with adequate nutrition and elimination  Outcome: Progressing     Problem: Depression  Goal: Treatment Goal: Demonstrate behavioral control of depressive symptoms, verbalize feelings of improved mood/affect, and adopt new coping skills prior to discharge  Outcome: Progressing  Goal: Verbalize thoughts and feelings  Description  Interventions:  - Assess and re-assess patient's level of risk   - Engage patient in 1:1 interactions, daily, for a minimum of 15 minutes   - Encourage patient to express feelings, fears, frustrations, hopes   Outcome: Progressing  Goal: Refrain from isolation  Description  Interventions:  - Develop a trusting relationship   - Encourage socialization   Outcome: Progressing  Goal: Refrain from self-neglect  Outcome: Progressing     Problem: Anxiety  Goal: Anxiety is at manageable level  Description  Interventions:  - Assess and monitor patient's anxiety level  - Monitor for signs and symptoms of anxiety both physical and emotional (heart palpitations, chest pain, shortness of breath, headaches, nausea, feeling jumpy, restlessness, irritable, apprehensive)  - Collaborate with interdisciplinary team and initiate plan and interventions as ordered    - [place] patient to unit/surroundings  - Explain treatment plan  - Encourage participation in care  - Encourage verbalization of concerns/fears  - Identify coping mechanisms  - Assist in developing anxiety-reducing skills  - Administer/offer alternative therapies  - Limit or eliminate stimulants  Outcome: Progressing     Problem: Alteration in Orientation  Goal: Interact with staff daily  Description  Interventions:  - Assess and re-assess patient's level of orientation  - Engage patient in 1 on 1 interactions, daily, for a minimum of 15 minutes   - Establish rapport/trust with patient   Outcome: Progressing     Problem: PAIN - ADULT  Goal: Verbalizes/displays adequate comfort level or baseline comfort level  Description  Interventions:  - Encourage patient to monitor pain and request assistance  - Assess pain using appropriate pain scale  - Administer analgesics based on type and severity of pain and evaluate response  - Implement non-pharmacological measures as appropriate and evaluate response  - Consider cultural and social influences on pain and pain management  - Notify physician/advanced practitioner if interventions unsuccessful or patient reports new pain  Outcome: Progressing     Problem: SAFETY ADULT  Goal: Patient will remain free of falls  Description  INTERVENTIONS:  - Assess patient frequently for physical needs  -  Identify cognitive and physical deficits and behaviors that affect risk of falls  -  [place] fall precautions as indicated by assessment   - Educate patient/family on patient safety including physical limitations  - Instruct patient to call for assistance with activity based on assessment  - Modify environment to reduce risk of injury  - Consider OT/PT consult to assist with strengthening/mobility  Outcome: Progressing     Problem: Nutrition/Hydration-ADULT  Goal: Nutrient/Hydration intake appropriate for improving, restoring or maintaining nutritional needs  Description  Monitor and assess patient's nutrition/hydration status for malnutrition  Collaborate with interdisciplinary team and initiate plan and interventions as ordered  Monitor patient's weight and dietary intake as ordered or per policy  Utilize nutrition screening tool and intervene as necessary   Determine patient's food preferences and provide high-protein, high-caloric foods as appropriate  INTERVENTIONS:  - Monitor oral intake, urinary output, labs, and treatment plans  - Assess nutrition and hydration status and recommend course of action  - Evaluate amount of meals eaten  - Assist patient with eating if necessary   - Allow adequate time for meals  - Recommend/ encourage appropriate diets, oral nutritional supplements, and vitamin/mineral supplements  - Order, calculate, and assess calorie counts as needed  - Recommend, monitor, and adjust tube feedings and TPN/PPN based on assessed needs  - Assess need for intravenous fluids  - Provide specific nutrition/hydration education as appropriate  - Include patient/family/caregiver in decisions related to nutrition  Outcome: Progressing   Mostly isolative to her bed, out for meds and meals, attended community meeting and IMR  No somatic complaints voiced this shift  Ate 50% of breakfast and 25% of lunch  Showered with staff assistance  No issues noted  Continue to monitor

## 2020-01-20 NOTE — PLAN OF CARE
Problem: Alteration in Thoughts and Perception  Goal: Agree to be compliant with medication regime, as prescribed and report medication side effects  Description  Interventions:  - Offer appropriate PRN medication and supervise ingestion; conduct aims, as needed   Outcome: Progressing     Problem: RESPIRATORY - ADULT  Goal: Achieves optimal ventilation and oxygenation  Description  INTERVENTIONS:  - Assess for changes in respiratory status  - Assess for changes in mentation and behavior  - Position to facilitate oxygenation and minimize respiratory effort  - Oxygen administration by appropriate delivery method based on oxygen saturation (per order) or ABGs  - Initiate smoking cessation education as indicated  - Encourage broncho-pulmonary hygiene including cough, deep breathe, Incentive Spirometry  - Assess the need for suctioning and aspirate as needed  - Assess and instruct to report SOB or any respiratory difficulty  - Respiratory Therapy support as indicated  Outcome: Progressing     Problem: Nutrition/Hydration-ADULT  Goal: Nutrient/Hydration intake appropriate for improving, restoring or maintaining nutritional needs  Description  Monitor and assess patient's nutrition/hydration status for malnutrition  Collaborate with interdisciplinary team and initiate plan and interventions as ordered  Monitor patient's weight and dietary intake as ordered or per policy  Utilize nutrition screening tool and intervene as necessary  Determine patient's food preferences and provide high-protein, high-caloric foods as appropriate       INTERVENTIONS:  - Monitor oral intake, urinary output, labs, and treatment plans  - Assess nutrition and hydration status and recommend course of action  - Evaluate amount of meals eaten  - Assist patient with eating if necessary   - Allow adequate time for meals  - Recommend/ encourage appropriate diets, oral nutritional supplements, and vitamin/mineral supplements  - Order, calculate, and assess calorie counts as needed  - Recommend, monitor, and adjust tube feedings and TPN/PPN based on assessed needs  - Assess need for intravenous fluids  - Provide specific nutrition/hydration education as appropriate  - Include patient/family/caregiver in decisions related to nutrition  Outcome: Progressing     Problem: Alteration in Thoughts and Perception  Goal: Complete daily ADLs, including personal hygiene independently, as able  Description  Interventions:  - Observe, teach, and assist patient with ADLS  - Monitor and promote a balance of rest/activity, with adequate nutrition and elimination   Outcome: Not Progressing     Problem: Depression  Goal: Refrain from isolation  Description  Interventions:  - Develop a trusting relationship   - Encourage socialization   Outcome: Not Progressing     2200 Sania Higginbotham has been isolative to her room & bed in free time  She has come out for meal (ate 75% w/[employer]), for scheduled medicines (except Singulair), to visit w/sister (warm interactions, ate foods sister brought), for HS snack  Did use the Incentive Spirometer, doing much better than last evening; achieved volumes of 1100-1250ml  Did not attend to hygiene as feels needs setup, assistance which wasn't available this shift  Says will bathe tomorrow 3-11  She is pleasant w/staff, but, seldom interacts w/peers  Wearing now her QHS humidified nasal O2 @ 1L for bed

## 2020-01-21 NOTE — ASSESSMENT & PLAN NOTE
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 and she has agreed to comply again    She did not complain about staff tampering with her oxygen concentrator or her inhaler She still exhibits some stilted speech as usual   Current medications:    Current Facility-Administered Medications:     acetaminophen (TYLENOL) tablet 650 mg, 650 mg, Oral, Q6H PRN, Roberto Colorado MD    albuterol (PROVENTIL HFA,VENTOLIN HFA) inhaler 2 puff, 2 puff, Inhalation, Q4H PRN, Roberto Colorado MD, 2 puff at 10/11/19 0424    aluminum-magnesium hydroxide-simethicone (MYLANTA) 200-200-20 mg/5 mL oral suspension 15 mL, 15 mL, Oral, Q4H PRN, Roberto Colorado MD    ammonium lactate (LAC-HYDRIN) 12 % lotion 1 application, 1 application, Topical, BID PRN, Roberto Colorado MD    benzonatate (TESSALON PERLES) capsule 100 mg, 100 mg, Oral, TID PRN, Roberto Colorado MD    benztropine (COGENTIN) injection 1 mg, 1 mg, Intramuscular, Q8H PRN, Roberto Colorado MD    carbamide peroxide (DEBROX) 6 5 % otic solution 5 drop, 5 drop, Left Ear, BID, Rona Raymundo MD, 5 drop at 01/19/20 0839    cloZAPine (CLOZARIL) tablet 25 mg, 25 mg, Oral, BID, Roberto Colorado MD, 25 mg at 01/20/20 3517    cloZAPine (CLOZARIL) tablet 50 mg, 50 mg, Oral, BID, Meredith Wesley MD, 50 mg at 01/20/20 2136    EPINEPHrine PF (ADRENALIN) 1 mg/mL injection 0 15 mg, 0 15 mg, Intramuscular, Once PRN, Meredith Wesley MD    fluticasone-vilanterol (BREO ELLIPTA) 200-25 MCG/INH inhaler 1 puff, 1 puff, Inhalation, Daily, Meredith Wesley MD, 1 puff at 01/21/20 0846    ketotifen (ZADITOR) 0 025 % ophthalmic solution 1 drop, 1 drop, Right Eye, BID PRN, Meredith Wesley MD    levothyroxine tablet 125 mcg, 125 mcg, Oral, Early Morning, Meredith Wesley MD, 125 mcg at 01/21/20 0604    magnesium hydroxide (MILK OF MAGNESIA) 400 mg/5 mL oral suspension 30 mL, 30 mL, Oral, Daily PRN, Meredith Wesley MD    montelukast (SINGULAIR) tablet 10 mg, 10 mg, Oral, HS, Meredith Wesley MD, 10 mg at 01/16/20 2106    OLANZapine (ZyPREXA) IM injection 5 mg, 5 mg, Intramuscular, Q8H PRN, Meredith Wesley MD    OLANZapine (ZyPREXA) tablet 5 mg, 5 mg, Oral, Q8H PRN, Meredith Wesley MD    ondansetron (ZOFRAN-ODT) dispersible tablet 4 mg, 4 mg, Oral, Q6H PRN, Meredith Wesley MD, 4 mg at 12/09/19 1757    pantoprazole (PROTONIX) EC tablet 40 mg, 40 mg, Oral, Early Morning, Meredith Wesley MD, 40 mg at 01/21/20 0604    polyethylene glycol (MIRALAX) packet 17 g, 17 g, Oral, Daily PRN, Meredith Wesley MD    polyvinyl alcohol (LIQUIFILM TEARS) 1 4 % ophthalmic solution 1 drop, 1 drop, Both Eyes, Q3H PRN, Meredith Wesley MD    sertraline (ZOLOFT) tablet 150 mg, 150 mg, Oral, Daily, Meredith Wesley MD, 150 mg at 01/21/20 0845    sucralfate (CARAFATE) oral suspension 1,000 mg, 1,000 mg, Oral, BID, Cat Torres MD, 1,000 mg at 01/21/20 0604    theophylline (JEF-24) 24 hr capsule 200 mg, 200 mg, Oral, Daily, Meredith Wesley MD, 200 mg at 01/21/20 0845    tiotropium [facility]) capsule for inhaler 18 mcg, 18 mcg, Inhalation, Daily, Meredith Wesley MD, 18 mcg at 01/21/20 0846    traZODone (DESYREL) tablet 25 mg, 25 mg, Oral, HS PRN, Meredith Wesley MD  Justification if on more than two antipsychotics:  Only on his clozapine  Side effects if any:  None    Risks , benefits, side effects and precautions of medications discussed with patient and reminded patient to let us know any problems with medications     Objective:     Vital Signs:  Vitals:    01/20/20 0700 01/20/20 1600 01/20/20 2000 01/21/20 0700   BP: 115/65 104/62 91/53 125/86   BP Location: Right arm Left arm Left arm Right arm   Pulse: 81 86 67 100   Resp: 16 14 16 20   Temp: 97 7 °F (36 5 °C) 97 6 °F (36 4 °C) (!) 97 3 °F (36 3 °C) 97 9 °F (36 6 °C)   TempSrc:  Temporal Temporal    SpO2:  94% 94%    Weight:       Height:         Appearance:  age appropriate, casually dressed and overweight older than stated age,well groomed today with combed hair wearing clean clothes ready for an outside trip with satff    Behavior:  evasive and guarded with no psychosomatic complaints, friendly and pleasant    Speech:  normal pitch and normal volume but circumstantial and tangential with stilted speech as usual    Mood:  anxious and dysthymic   Affect:  mood-congruent, elated and entitled at times   Thought Process:  goal directed and illogical slightly pressured and tangential and talks as if in a court of law   Thought Content:   Not raising any suspicion today about staff tampering with her oxygen concentrator or spirometer today  No current suicidal homicidal thoughts intent or plans verbalized  No  overt delusional beliefs reported or elicited today     Perceptual Disturbances: None and does not appear responding to internal stimuli    Risk Potential: Tendency to not care for herself    Sensorium:  person, place, time/date, situation, day of week, month of year and time   Cognition:  grossly intact with no deficits in recent or remote memory or immediate recall   Consciousness:  alert and awake    Attention: Intact concentration and attention span   Intellect: Considered to be at least of average intelligence   Insight:  limited but improving   Judgment: Improving slowly Motor Activity: no abnormal movements         Recent Labs:  Results Reviewed     None          I/O Past 24 hours:  No intake/output data recorded  No intake/output data recorded  Assessment / Plan:     Schizoaffective disorder, bipolar type ([address] )      Reason for continued inpatient care:  Because of underlying paranoia and inability to care for herself on her own and multiple somatic complaints  Acceptance by patient:  Accepting  Quinn Velásquez in recovery:  About living in same personal care home at the [facility] once she feels better  Understanding of medications :  Has some understanding  Involved in reintegration process: On off unit privileges now  Trusting in relatoinship with psychiatrist:  Trusting    Recommended Treatment:    Medication changes:  1) none today  Non-pharmacological treatments  1) continue with  individual therapy group therapy, milieu therapy and occupational therapy and milieu therapy involving multidisciplinary team approach with psychotherapist, case management, nursing, peer support services, art therapist etc using recovery principles and psycho-education about accepting illness and need for treatment   2) encourage to cooperate with behavior plan  3) encourage to attend to ADLs like taking showers and wearing clean clothes   4) Encourage to attend groups   5) see how she does on off unit privileges and see how she does with staff off grounds today and expectations discussed with her and she did verbalize an understanding,counseled not to smoke while on passes because of COPD, to consider pass with sister next week   Safety  1) Safety/communication plan established targeting dynamic risk factors above  Discharge Plan back to the [facility] with act services and withdraw the [facility] referral due to improvement made so far  Counseling / Coordination of Care    Total floor / unit time spent today 15 minutes   Greater than 50% of total time was spent with the patient and / or family counseling and / or coordination of care  A description of the counseling / coordination of care  Patient's Rights, confidentiality and exceptions to confidentiality, use of automated medical record, Jeb Patrick staff access to medical record, and consent to treatment reviewed      Meredith Wesley MD

## 2020-01-21 NOTE — PLAN OF CARE
Problem: Alteration in Thoughts and Perception  Goal: Verbalize thoughts and feelings  Description  Interventions:  - Promote a nonjudgmental and trusting relationship with the patient through active listening and therapeutic communication  - Assess patient's level of functioning, behavior and potential for risk  - Engage patient in 1 on 1 interactions for a minimum of 15 minutes each session  - Encourage patient to express fears, feelings, frustrations, and discuss symptoms    - [place] patient to reality, help patient recognize reality-based thinking   - Administer medications as ordered and assess for potential side effects  - Provide the patient education related to the signs and symptoms of the illness and desired effects of prescribed medications  Outcome: Progressing  Goal: Agree to be compliant with medication regime, as prescribed and report medication side effects  Description  Interventions:  - Offer appropriate PRN medication and supervise ingestion; conduct aims, as needed   Outcome: Progressing  Goal: Attend and participate in unit activities, including therapeutic, recreational, and educational groups  Description  Interventions:  - Provide therapeutic and educational activities daily, encourage attendance and participation, and document same in the medical record     CERTIFIED PEER SPECIALIST INTERVENTIONS:    Complete peer assessment with patient to assess their needs and identify their goals to complete while in the recovery program as well as once discharged into the community  Patient will complete WRAP Plan, Crisis Plan and [employer] Domains  Patient will attend 50% of groups offered on the unit  Patient will complete a goal card weekly      Outcome: Progressing     Problem: Depression  Goal: Refrain from isolation  Description  Interventions:  - Develop a trusting relationship   - Encourage socialization   Outcome: Progressing     Problem: RESPIRATORY - ADULT  Goal: Achieves optimal ventilation and oxygenation  Description  INTERVENTIONS:  - Assess for changes in respiratory status  - Assess for changes in mentation and behavior  - Position to facilitate oxygenation and minimize respiratory effort  - Oxygen administration by appropriate delivery method based on oxygen saturation (per order) or ABGs  - Initiate smoking cessation education as indicated  - Encourage broncho-pulmonary hygiene including cough, deep breathe, Incentive Spirometry  - Assess the need for suctioning and aspirate as needed  - Assess and instruct to report SOB or any respiratory difficulty  - Respiratory Therapy support as indicated  Outcome: Progressing     Problem: Nutrition/Hydration-ADULT  Goal: Nutrient/Hydration intake appropriate for improving, restoring or maintaining nutritional needs  Description  Monitor and assess patient's nutrition/hydration status for malnutrition  Collaborate with interdisciplinary team and initiate plan and interventions as ordered  Monitor patient's weight and dietary intake as ordered or per policy  Utilize nutrition screening tool and intervene as necessary  Determine patient's food preferences and provide high-protein, high-caloric foods as appropriate       INTERVENTIONS:  - Monitor oral intake, urinary output, labs, and treatment plans  - Assess nutrition and hydration status and recommend course of action  - Evaluate amount of meals eaten  - Assist patient with eating if necessary   - Allow adequate time for meals  - Recommend/ encourage appropriate diets, oral nutritional supplements, and vitamin/mineral supplements  - Order, calculate, and assess calorie counts as needed  - Recommend, monitor, and adjust tube feedings and TPN/PPN based on assessed needs  - Assess need for intravenous fluids  - Provide specific nutrition/hydration education as appropriate  - Include patient/family/caregiver in decisions related to nutrition  Outcome: Progressing     [address] has been more visible in milieu this shift  She sat out for prolonged period in dining room  She is pleasant, but, not interactive w/peers  Ate 75% of meal (egg salad, crackers, ice cream & Ensure), had HS snack  Came up on own for scheduled medicines except the Singulair  Used the "[employer]" achieving 1100-1250ml volumes  Looking forward to outing tomorrow to [employer]  Contemplates going instead to [facility] as likes their coffee, might want to buy cigarettes  Rationalizes that a couple rare cigarettes will not worsen her COPD  Was advised against smoking  Wearing now her QHS humidified nasal O2 @ 1L for bed

## 2020-01-21 NOTE — PROGRESS NOTES
01/21/20 0900   Team Meeting   Meeting Type Daily Rounds   Team Members Present   Team Members Present Physician;Nurse;; Other (Discipline and Name)   Physician Team Member Dr Ricardo Rivera Team Member Delphine Whiting RN   Care Management Team Member Brenda Sullivan   Other (Discipline and Name) Twan Richards LCSW     Patient attended groups  For a community outing with staff today  Trying to get staff to change her trip from [place] [id number] to [facility] so she can buy cigarettes  Slept

## 2020-01-21 NOTE — PLAN OF CARE

## 2020-01-21 NOTE — PROGRESS NOTES
01/21/20 1100   Activity/Group Checklist   Group Other (Comment)  (IMR)   Attendance Attended   Attendance Duration (min) 46-60   Interactions Interacted appropriately   Affect/Mood Appropriate   Goals Achieved Identified feelings; Able to recieve feedback; Able to give feedback to another;Able to manage/cope with feelings     Patient attended group today  Patient was able to evaluate and acknowledge her personal growth since her admission date on the [facility]  Patient was able to set goals to work on her physical and mental health while waiting to be discharge from the unit  Patient was able to receive and give feedback to her peers during group  Patient participated and was respectful of peers

## 2020-01-21 NOTE — PROGRESS NOTES
Not scheduled to attend      01/21/20 1430   Activity/Group Checklist   Group   (Community Programming Wrap Up  )   Attendance Did not attend   Attendance Duration (min) 16-30   Affect/Mood IRMA

## 2020-01-21 NOTE — PROGRESS NOTES
01/21/20 0920   Team Meeting   Meeting Type Tx Team Meeting   Initial Conference Date 01/21/20   Next Conference Date 01/28/20   Team Members Present   Team Members Present Physician;Nurse;; Other (Discipline and Name)   Physician Team Member Dr Wild Wesley Team Member Carter Emerson, RN   Care Management Team Member Brenda Rodriguez   Other (Discipline and Name) Julia Duran LCSW   Patient/Family Present   Patient Present Yes   Patient's Family Present No     Patient attended treatment team meeting this morning without her self assessment completed  Patient attended 72% of groups offered last week  Patient is scheduled to go off grounds with staff today to [employer] for coffee, however, she was trying to get staff to change her trip to [facility] so she can buy cigarettes  Treatment team informed her that she is not allowed to change the trip last minute and furthermore, cannot condone a trip for her to get cigarettes while she is consistently still complaining about her breathing and O2 usage  Patient agreed to stick with the original plans of going to [facility]  Team and patient completed risk assessment and the patient did not verbalize any desire to elope from the program  Patient verbalized understanding of consequences of eloping from treatment while on a commitment  Patient verbalized no further questions or concerns at the conclusion of the meeting

## 2020-01-21 NOTE — PROGRESS NOTES
01/17/20 1100   Activity/Group Checklist   Group Other (Comment)  (IMR - Weekly Goal Review)   Attendance Attended   Attendance Duration (min) 31-45   Interactions Interacted appropriately   Affect/Mood Appropriate   Goals Achieved Able to self-disclose; Able to recieve feedback; Able to give feedback to another;Identified feelings; Discussed coping strategies; Increased hopefulness; Able to listen to others; Able to engage in interactions; Able to reflect/comment on own behavior     Patient attended [facility] - Weekly Goal Review  Patient's accomplishments for this week included getting to groups, earning off-grounds privileges, and to "eat well "  Patient continues to ask about a patient who is no longer on the unit, despite being told that the staff does not have information and would not be able to provide it if they did  Patient tends to push limits in this passive-aggressive manner  Nonetheless, patient participated and was respectful of peers

## 2020-01-21 NOTE — PLAN OF CARE
Problem: Alteration in Thoughts and Perception  Goal: Treatment Goal: Gain control of psychotic behaviors/thinking, reduce/eliminate presenting symptoms and demonstrate improved reality functioning upon discharge  Outcome: Progressing  Goal: Verbalize thoughts and feelings  Description  Interventions:  - Promote a nonjudgmental and trusting relationship with the patient through active listening and therapeutic communication  - Assess patient's level of functioning, behavior and potential for risk  - Engage patient in 1 on 1 interactions for a minimum of 15 minutes each session  - Encourage patient to express fears, feelings, frustrations, and discuss symptoms    - [place] patient to reality, help patient recognize reality-based thinking   - Administer medications as ordered and assess for potential side effects  - Provide the patient education related to the signs and symptoms of the illness and desired effects of prescribed medications  Outcome: Progressing  Goal: Agree to be compliant with medication regime, as prescribed and report medication side effects  Description  Interventions:  - Offer appropriate PRN medication and supervise ingestion; conduct aims, as needed   Outcome: Progressing     Problem: Ineffective Coping  Goal: Patient/Family verbalizes awareness of resources  Outcome: Progressing  Goal: Understands least restrictive measures  Description  Interventions:  - Utilize least restrictive behavior  Outcome: Progressing     Problem: Risk for Self Injury/Neglect  Goal: Treatment Goal: Remain safe during length of stay, learn and adopt new coping skills, and be free of self-injurious ideation, impulses and acts at the time of discharge  Outcome: Progressing  Goal: Verbalize thoughts and feelings  Description  Interventions:  - Assess and re-assess patient's lethality and potential for self-injury  - Engage patient in 1:1 interactions, daily, for a minimum of 15 minutes  - Encourage patient to express feelings, fears, frustrations, hopes  - Establish rapport/trust with patient   Outcome: Progressing  Goal: Refrain from harming self  Description  Interventions:  - Monitor patient closely, per order  - Develop a trusting relationship  - Supervise medication ingestion, monitor effects and side effects   Outcome: Progressing     Problem: Depression  Goal: Treatment Goal: Demonstrate behavioral control of depressive symptoms, verbalize feelings of improved mood/affect, and adopt new coping skills prior to discharge  Outcome: Progressing  Goal: Verbalize thoughts and feelings  Description  Interventions:  - Assess and re-assess patient's level of risk   - Engage patient in 1:1 interactions, daily, for a minimum of 15 minutes   - Encourage patient to express feelings, fears, frustrations, hopes   Outcome: Progressing  Goal: Refrain from isolation  Description  Interventions:  - Develop a trusting relationship   - Encourage socialization   Outcome: Progressing  Goal: Refrain from self-neglect  Outcome: Progressing     Problem: Anxiety  Goal: Anxiety is at manageable level  Description  Interventions:  - Assess and monitor patient's anxiety level  - Monitor for signs and symptoms of anxiety both physical and emotional (heart palpitations, chest pain, shortness of breath, headaches, nausea, feeling jumpy, restlessness, irritable, apprehensive)  - Collaborate with interdisciplinary team and initiate plan and interventions as ordered    - [place] patient to unit/surroundings  - Explain treatment plan  - Encourage participation in care  - Encourage verbalization of concerns/fears  - Identify coping mechanisms  - Assist in developing anxiety-reducing skills  - Administer/offer alternative therapies  - Limit or eliminate stimulants  Outcome: Progressing     Problem: Alteration in Orientation  Goal: Interact with staff daily  Description  Interventions:  - Assess and re-assess patient's level of orientation  - Engage patient in 1 on 1 interactions, daily, for a minimum of 15 minutes   - Establish rapport/trust with patient   Outcome: Progressing     Problem: PAIN - ADULT  Goal: Verbalizes/displays adequate comfort level or baseline comfort level  Description  Interventions:  - Encourage patient to monitor pain and request assistance  - Assess pain using appropriate pain scale  - Administer analgesics based on type and severity of pain and evaluate response  - Implement non-pharmacological measures as appropriate and evaluate response  - Consider cultural and social influences on pain and pain management  - Notify physician/advanced practitioner if interventions unsuccessful or patient reports new pain  Outcome: Progressing     Problem: SAFETY ADULT  Goal: Patient will remain free of falls  Description  INTERVENTIONS:  - Assess patient frequently for physical needs  -  Identify cognitive and physical deficits and behaviors that affect risk of falls  -  [place] fall precautions as indicated by assessment   - Educate patient/family on patient safety including physical limitations  - Instruct patient to call for assistance with activity based on assessment  - Modify environment to reduce risk of injury  - Consider OT/PT consult to assist with strengthening/mobility  Outcome: Progressing     Problem: Nutrition/Hydration-ADULT  Goal: Nutrient/Hydration intake appropriate for improving, restoring or maintaining nutritional needs  Description  Monitor and assess patient's nutrition/hydration status for malnutrition  Collaborate with interdisciplinary team and initiate plan and interventions as ordered  Monitor patient's weight and dietary intake as ordered or per policy  Utilize nutrition screening tool and intervene as necessary  Determine patient's food preferences and provide high-protein, high-caloric foods as appropriate       INTERVENTIONS:  - Monitor oral intake, urinary output, labs, and treatment plans  - Assess nutrition and hydration status and recommend course of action  - Evaluate amount of meals eaten  - Assist patient with eating if necessary   - Allow adequate time for meals  - Recommend/ encourage appropriate diets, oral nutritional supplements, and vitamin/mineral supplements  - Order, calculate, and assess calorie counts as needed  - Recommend, monitor, and adjust tube feedings and TPN/PPN based on assessed needs  - Assess need for intravenous fluids  - Provide specific nutrition/hydration education as appropriate  - Include patient/family/caregiver in decisions related to nutrition  Outcome: Progressing     Problem: Alteration in Thoughts and Perception  Goal: Recognize dysfunctional thoughts, communicate reality-based thoughts at the time of discharge  Description  Interventions:  - Provide medication and psycho-education to assist patient in compliance and developing insight into his/her illness   Outcome: Not Progressing     Problem: Ineffective Coping  Goal: Demonstrates healthy coping skills  Outcome: Not Progressing     Problem: Risk for Self Injury/Neglect  Goal: Recognize maladaptive responses and adopt new coping mechanisms  Outcome: Not Progressing   Mostly isolative to her room but did come out for meds and meals and went off unit on a pass with staff  Ate 30% of breakfast and 10% of lunch  Early this morning she had requested to have her pass changed from going to [facility] to [facility]  When this writer and the other staff nurse became aware that the reason that she wanted to change where she went on her pass was so that she could purchase cigarettes, both of us pointed out to her how she is always concerned about her breathing, oxygen saturation, and respiratory medications, and thus were advising her against purchasing cigarettes  She immediately became irritable and dismissive and walked away after   Shortly after, she had her treatment team meeting where they explicitly told her that she would only be allowed to go to [facility] and would not be allowed to purchase cigarettes  Dr Robinson Stein also reiterated this to her before leaving for her pass  Then while leaving the unit when one of the staff had said goodbye to her and to enjoy her pass, she responded sarcastically "I won't"  Upon return to the unit, it was then communicated that the patient had "changed her mind" while on pass and asked to go to [facility] for coffee  They then reportedly went there for coffee and then stopped at a park for to sit for a while before returning to the unit  Treatment team was made aware and will be addressing her behaviors with her later  No other behaviors or issues noted  Continue to monitor

## 2020-01-21 NOTE — PROGRESS NOTES
Progress Note - Violetta Villasenor 1955, 60 y o  female MRN: [id number]    Unit/Bed#: [facility] 110-02 Encounter: [id number]    Primary Care Provider: Trish Alfaro PA-C   Date and time admitted to hospital: 7/23/2019  5:30 PM        * Schizoaffective disorder, bipolar type [facility])  Assessment & Plan  Psychiatry Progress Note  Patient did take a shower yesterday in anticipation of going out today to the community to [facility]  She initially wanted to go to [facility] for cigarettes and finally agreed to go to [employer] instead  She has been attending 72% groups including IMRS   She has not  Verbalized in several weeks  that she has cancer or dying from terrible illnesses or about having any micro chip or implant 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   She still has a tendency to complain about swallowing difficulties and breathing difficulty and some other psychosomatic symptoms off and on  She is eating most of her meals so far  I did remind her again  that she needs to keep up with her hygiene like taking showers twice a week and attending 50% of groups to be able to return back to the  personal care home at [facility] and she has agreed to comply again    She did not complain about staff tampering with her oxygen concentrator or her inhaler She still exhibits some stilted speech as usual   Current medications:    Current Facility-Administered Medications:     acetaminophen (TYLENOL) tablet 650 mg, 650 mg, Oral, Q6H PRN, Manuel Copeland MD    albuterol (PROVENTIL HFA,VENTOLIN HFA) inhaler 2 puff, 2 puff, Inhalation, Q4H PRN, Manuel Copeland MD, 2 puff at 10/11/19 0424    aluminum-magnesium hydroxide-simethicone (MYLANTA) 200-200-20 mg/5 mL oral suspension 15 mL, 15 mL, Oral, Q4H PRN, Manuel Copeland MD    ammonium lactate (LAC-HYDRIN) 12 % lotion 1 application, 1 application, Topical, BID PRN, Manuel Copeland MD    benzonatate (TESSALON PERLES) capsule 100 mg, 100 mg, Oral, TID PRN, Manuel Copeland MD    benztropine (COGENTIN) injection 1 mg, 1 mg, Intramuscular, Q8H PRN, Deedee Muir MD    carbamide peroxide (DEBROX) 6 5 % otic solution 5 drop, 5 drop, Left Ear, BID, Rona Clinton MD, 5 drop at 01/19/20 0839    cloZAPine (CLOZARIL) tablet 25 mg, 25 mg, Oral, BID, Deedee Muir MD, 25 mg at 01/20/20 2136    cloZAPine (CLOZARIL) tablet 50 mg, 50 mg, Oral, BID, Deedee Muir MD, 50 mg at 01/20/20 2136    EPINEPHrine PF (ADRENALIN) 1 mg/mL injection 0 15 mg, 0 15 mg, Intramuscular, Once PRN, Deedee Muir MD    fluticasone-vilanterol (BREO ELLIPTA) 200-25 MCG/INH inhaler 1 puff, 1 puff, Inhalation, Daily, Deedee Muir MD, 1 puff at 01/21/20 0846    ketotifen (ZADITOR) 0 025 % ophthalmic solution 1 drop, 1 drop, Right Eye, BID PRN, Deedee Muir MD    levothyroxine tablet 125 mcg, 125 mcg, Oral, Early Morning, Deedee Muir MD, 125 mcg at 01/21/20 0604    magnesium hydroxide (MILK OF MAGNESIA) 400 mg/5 mL oral suspension 30 mL, 30 mL, Oral, Daily PRN, Deedee Muir MD    montelukast (SINGULAIR) tablet 10 mg, 10 mg, Oral, HS, Deedee Muir MD, 10 mg at 01/16/20 2106    OLANZapine (ZyPREXA) IM injection 5 mg, 5 mg, Intramuscular, Q8H PRN, Deedee Muir MD    OLANZapine (ZyPREXA) tablet 5 mg, 5 mg, Oral, Q8H PRN, Deedee Muir MD    ondansetron (ZOFRAN-ODT) dispersible tablet 4 mg, 4 mg, Oral, Q6H PRN, Deedee Muir MD, 4 mg at 12/09/19 1757    pantoprazole (PROTONIX) EC tablet 40 mg, 40 mg, Oral, Early Morning, Deedee Muir MD, 40 mg at 01/21/20 0604    polyethylene glycol (MIRALAX) packet 17 g, 17 g, Oral, Daily PRN, Deedee Muir MD    polyvinyl alcohol (LIQUIFILM TEARS) 1 4 % ophthalmic solution 1 drop, 1 drop, Both Eyes, Q3H PRN, Deedee Muir MD    sertraline (ZOLOFT) tablet 150 mg, 150 mg, Oral, Daily, Deedee Muir MD, 150 mg at 01/21/20 0845    sucralfate (CARAFATE) oral suspension 1,000 mg, 1,000 mg, Oral, BID, Cat Torres MD, 1,000 mg at 01/21/20 0604    theophylline (JEF-24) 24 hr capsule 200 mg, 200 mg, Oral, Daily, Allie Guzman MD, 200 mg at 01/21/20 0845    tiotropium [facility]) capsule for inhaler 18 mcg, 18 mcg, Inhalation, Daily, Allie Guzman MD, 18 mcg at 01/21/20 0846    traZODone (DESYREL) tablet 25 mg, 25 mg, Oral, HS PRN, Allie Guzman MD  Justification if on more than two antipsychotics:  Only on his clozapine  Side effects if any:  None    Risks , benefits, side effects and precautions of medications discussed with patient and reminded patient to let us know any problems with medications     Objective:     Vital Signs:  Vitals:    01/20/20 0700 01/20/20 1600 01/20/20 2000 01/21/20 0700   BP: 115/65 104/62 91/53 125/86   BP Location: Right arm Left arm Left arm Right arm   Pulse: 81 86 67 100   Resp: 16 14 16 20   Temp: 97 7 °F (36 5 °C) 97 6 °F (36 4 °C) (!) 97 3 °F (36 3 °C) 97 9 °F (36 6 °C)   TempSrc:  Temporal Temporal    SpO2:  94% 94%    Weight:       Height:         Appearance:  age appropriate, casually dressed and overweight older than stated age,well groomed today with combed hair wearing clean clothes ready for an outside trip with satff    Behavior:  evasive and guarded with no psychosomatic complaints, friendly and pleasant    Speech:  normal pitch and normal volume but circumstantial and tangential with stilted speech as usual    Mood:  anxious and dysthymic   Affect:  mood-congruent, elated and entitled at times   Thought Process:  goal directed and illogical slightly pressured and tangential and talks as if in a court of law   Thought Content:   Not raising any suspicion today about staff tampering with her oxygen concentrator or spirometer today  No current suicidal homicidal thoughts intent or plans verbalized  No  overt delusional beliefs reported or elicited today     Perceptual Disturbances: None and does not appear responding to internal stimuli    Risk Potential: Tendency to not care for herself    Sensorium:  person, place, time/date, situation, day of week, month of year and time   Cognition:  grossly intact with no deficits in recent or remote memory or immediate recall   Consciousness:  alert and awake    Attention: Intact concentration and attention span   Intellect: Considered to be at least of average intelligence   Insight:  limited but improving   Judgment: Improving slowly      Motor Activity: no abnormal movements         Recent Labs:  Results Reviewed     None          I/O Past 24 hours:  No intake/output data recorded  No intake/output data recorded  Assessment / Plan:     Schizoaffective disorder, bipolar type ([facility] [address] )      Reason for continued inpatient care:  Because of underlying paranoia and inability to care for herself on her own and multiple somatic complaints  Acceptance by patient:  Accepting  Rosa Noble in recovery:  About living in same personal care home at the [place] once she feels better  Understanding of medications :  Has some understanding  Involved in reintegration process: On off unit privileges now  Trusting in relatoinship with psychiatrist:  Trusting    Recommended Treatment:    Medication changes:  1) none today  Non-pharmacological treatments  1) continue with  individual therapy group therapy, milieu therapy and occupational therapy and milieu therapy involving multidisciplinary team approach with psychotherapist, case management, nursing, peer support services, art therapist etc using recovery principles and psycho-education about accepting illness and need for treatment     2) encourage to cooperate with behavior plan  3) encourage to attend to ADLs like taking showers and wearing clean clothes   4) Encourage to attend groups   5) see how she does on off unit privileges and see how she does with staff off grounds today and expectations discussed with her and she did verbalize an understanding,counseled not to smoke while on passes because of COPD, to consider pass with sister next week   Safety  1) Safety/communication plan established targeting dynamic risk factors above  Discharge Plan back to the [facility] with act services and withdraw the [facility] referral due to improvement made so far  Counseling / Coordination of Care    Total floor / unit time spent today 15 minutes  Greater than 50% of total time was spent with the patient and / or family counseling and / or coordination of care  A description of the counseling / coordination of care  Patient's Rights, confidentiality and exceptions to confidentiality, use of automated medical record, [facility] to [facility] record, and consent to treatment reviewed      Chichi Lockett MD

## 2020-01-22 NOTE — ASSESSMENT & PLAN NOTE
Psychiatry Progress Note  Patient did go to [facility] store anyway despite instruction not to go there and go to only [employer] and she also went for a walk in the park which was also not part of the schedule trip and staff was told that this did not happen in future  Patient was also told that she has to follow instructions  She claims she only had coffee at the store and did not smoke cigarettes  Her defiant behavior was discussed in team today and we decided to have her go on another pass with staff escort next week to see if she will comply before we can allow her to go on a pass with her family as she had to say the following and this was conveyed to her and she did verbalize an understanding  She was reportedly out of breath when she came back yesterday possibly because of too much walking and exposure to cold weather but her pulse ox was reportedly within normal limits and she did not need any oxygen     She has not reported having cancer or dying from terrible illnesses or about having any micro chip or implant 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   She still has a tendency to complain about swallowing difficulties and breathing difficulty and some other psychosomatic symptoms off and on which comes and goes  She is eating most of her meals so far  I did remind her again  that she needs to keep up with her hygiene like taking showers twice a week and attending 50% of groups to be able to return back to the  personal care home at [facility] and she has stated that she will try to comply as that is where she wants to go     She did not complain about staff tampering with her oxygen concentrator or her inhaler lately   She still exhibits some stilted speech and grooming is fair today but was found laying back on bed in the morning as usual but got up and spoke to me without any hesitancy     Current medications:    Current Facility-Administered Medications:     acetaminophen (TYLENOL) tablet 650 mg, 650 mg, Oral, Q6H PRN, Dalton Garner MD    albuterol (PROVENTIL HFA,VENTOLIN HFA) inhaler 2 puff, 2 puff, Inhalation, Q4H PRN, Dalton Garner MD, 2 puff at 10/11/19 0424    aluminum-magnesium hydroxide-simethicone (MYLANTA) 200-200-20 mg/5 mL oral suspension 15 mL, 15 mL, Oral, Q4H PRN, Dalton Garner MD    ammonium lactate (LAC-HYDRIN) 12 % lotion 1 application, 1 application, Topical, BID PRN, Dalton Garner MD    benzonatate (TESSALON PERLES) capsule 100 mg, 100 mg, Oral, TID PRN, Dalton Garner MD    benztropine (COGENTIN) injection 1 mg, 1 mg, Intramuscular, Q8H PRN, Dalton Garner MD    carbamide peroxide (DEBROX) 6 5 % otic solution 5 drop, 5 drop, Left Ear, BID, Rona Correia MD, 5 drop at 01/19/20 0839    cloZAPine (CLOZARIL) tablet 25 mg, 25 mg, Oral, BID, Dalton Garner MD, 25 mg at 01/21/20 2047    cloZAPine (CLOZARIL) tablet 50 mg, 50 mg, Oral, BID, Dalton Garner MD, 50 mg at 01/21/20 2047    EPINEPHrine PF (ADRENALIN) 1 mg/mL injection 0 15 mg, 0 15 mg, Intramuscular, Once PRN, Dalton Garner MD    fluticasone-vilanterol (BREO ELLIPTA) 200-25 MCG/INH inhaler 1 puff, 1 puff, Inhalation, Daily, Dalton Garner MD, 1 puff at 01/22/20 0842    ketotifen (ZADITOR) 0 025 % ophthalmic solution 1 drop, 1 drop, Right Eye, BID PRN, Dalton Garner MD    levothyroxine tablet 125 mcg, 125 mcg, Oral, Early Morning, Dalton Garner MD, 125 mcg at 01/22/20 8311    magnesium hydroxide (MILK OF MAGNESIA) 400 mg/5 mL oral suspension 30 mL, 30 mL, Oral, Daily PRN, Dalton Garner MD    montelukast (SINGULAIR) tablet 10 mg, 10 mg, Oral, HS, Dalton Garner MD, 10 mg at 01/16/20 2106    OLANZapine (ZyPREXA) IM injection 5 mg, 5 mg, Intramuscular, Q8H PRN, Dalton Garner MD    OLANZapine (ZyPREXA) tablet 5 mg, 5 mg, Oral, Q8H PRN, Dalton Garner MD    ondansetron (ZOFRAN-ODT) dispersible tablet 4 mg, 4 mg, Oral, Q6H PRN, Dalton Garner MD, 4 mg at 12/09/19 1757    pantoprazole (PROTONIX) EC tablet 40 mg, 40 mg, Oral, Early Morning, Dalton Garner MD, 40 mg at 01/22/20 5799    polyethylene glycol (MIRALAX) packet 17 g, 17 g, Oral, Daily PRN, Dalton Garner MD    polyvinyl alcohol (LIQUIFILM TEARS) 1 4 % ophthalmic solution 1 drop, 1 drop, Both Eyes, Q3H PRN, Dalton Garner MD    sertraline (ZOLOFT) tablet 150 mg, 150 mg, Oral, Daily, Dalton Garner MD, 150 mg at 01/22/20 9833    sucralfate (CARAFATE) oral suspension 1,000 mg, 1,000 mg, Oral, BID, Jd Koehler MD, 1,000 mg at 01/22/20 3667    theophylline (JEF-24) 24 hr capsule 200 mg, 200 mg, Oral, Daily, Dalton Garner MD, 200 mg at 01/22/20 0846    tiotropium [facility]) capsule for inhaler 18 mcg, 18 mcg, Inhalation, Daily, Dalton Garner MD, 18 mcg at 01/22/20 0843    traZODone (DESYREL) tablet 25 mg, 25 mg, Oral, HS PRN, Dalton Garner MD  Justification if on more than two antipsychotics:  Only on his clozapine  Side effects if any:  None    Risks , benefits, side effects and precautions of medications discussed with patient and reminded patient to let us know any problems with medications     Objective:     Vital Signs:  Vitals:    01/21/20 1900 01/21/20 2130 01/22/20 0622 01/22/20 0700   BP: 96/52   113/78   BP Location: Left arm   Left arm   Pulse: 98   90   Resp: 15   18   Temp: (!) 97 °F (36 1 °C)   97 8 °F (36 6 °C)   TempSrc: Temporal   Temporal   SpO2: 90% 94% 96%    Weight:       Height:         Appearance:  age appropriate, casually dressed and overweight older than stated age,well groomed today with combed hair wearing clean clothes found laying on bed but did get up and sat is sit up to talk to me   Behavior:  evasive and guarded with no psychosomatic complaints, friendly and pleasant and cooperative most of the time   Speech:  normal pitch and normal volume but circumstantial and tangential with stilted speech as usual    Mood:  anxious and dysthymic   Affect:  mood-congruent, elated and entitled at times   Thought Process:  goal directed and illogical slightly pressured and tangential and talks as if in a court of law   Thought Content:  No suspicious knows reported today about staff tampering with her oxygen concentrator or spirometer today  No current suicidal homicidal thoughts intent or plans verbalized at all  No  overt delusional beliefs reported or elicited today but she appears to have some underlying paranoia  Perceptual Disturbances: None and does not appear responding to internal stimuli    Risk Potential: Tendency to not care for herself    Sensorium:  person, place, time/date, situation, day of week, month of year and time   Cognition:  grossly intact with no deficits in recent or remote memory or immediate recall   Consciousness:  alert and awake    Attention: Intact concentration and attention span   Intellect: Considered to be at least of average intelligence   Insight:  limited but improving   Judgment: Improving slowly      Motor Activity: no abnormal movements         Recent Labs:  Results Reviewed     None          I/O Past 24 hours:  No intake/output data recorded  No intake/output data recorded  Assessment / Plan:     [facility]      Reason for continued inpatient care:  Because of underlying paranoia and inability to care for herself on her own and multiple somatic complaints  Acceptance by patient:  Accepting  Pauletta Dose in recovery:  About living in same personal care home at the [facility] once she feels better  Understanding of medications :  Has some understanding  Involved in reintegration process:   On off unit privileges now  Trusting in relatoinship with psychiatrist:  Trusting    Recommended Treatment:    Medication changes:  1) none today  Non-pharmacological treatments  1) continue with  individual therapy group therapy, milieu therapy and occupational therapy and milieu therapy involving multidisciplinary team approach with psychotherapist, case management, nursing, peer support services, art therapist etc using recovery principles and psycho-education about accepting illness and need for treatment   2) encourage to cooperate with behavior plan  3) encourage to attend to ADLs like taking showers and wearing clean clothes   4) Encourage to attend groups   5) see how she does on off unit privileges and see how she does with staff off grounds today and expectations discussed with her and she did verbalize an understanding,counseled not to smoke while on passes because of COPD, to consider pass with sister next week   Safety  1) Safety/communication plan established targeting dynamic risk factors above  Discharge Plan back to the [facility] with act services and withdraw the [facility] referral due to improvement made so far  Counseling / Coordination of Care    Total floor / unit time spent today 15 minutes  Greater than 50% of total time was spent with the patient and / or family counseling and / or coordination of care  A description of the counseling / coordination of care  Patient's Rights, confidentiality and exceptions to confidentiality, use of automated medical record, [facility] staff access to medical record, and consent to treatment reviewed      Oval MD Joseluis

## 2020-01-22 NOTE — PROGRESS NOTES
01/22/20 0900   Team Meeting   Meeting Type Daily Rounds   Team Members Present   Team Members Present Physician;Nurse;; Other (Discipline and Name)   Physician Team Member Dr Pia Edwards Team Member Roverto Gonzalez, LISA   Care Management Team Member Brenda Johnson   Other (Discipline and Name) Hollie Sharpe, [place]; SHANIKA Mason     Patient was very manipulative with staff yesterday, convincing staff to take her to [facility] and a park instead of going to [place] [id number] for coffee as scheduled  Slept

## 2020-01-22 NOTE — PROGRESS NOTES
Progress Note - Erika Titus 1955, 60 y o  female MRN: [id number]    Unit/Bed#: [facility] 110-02 Encounter: [id number]    Primary Care Provider: Sheron Rangel PA-C   Date and time admitted to hospital: 7/23/2019  5:30 PM        * Schizoaffective disorder, bipolar type [facility])  Assessment & Plan  Psychiatry Progress Note  Patient did go to [facility] store anyway despite instruction not to go there and go to only [employer] [place] and she also went for a walk in the park which was also not part of the schedule trip and staff was told that this did not happen in future  Patient was also told that she has to follow instructions  She claims she only had coffee at the store and did not smoke cigarettes  Her defiant behavior was discussed in team today and we decided to have her go on another pass with staff escort next week to see if she will comply before we can allow her to go on a pass with her family as she had to say the following and this was conveyed to her and she did verbalize an understanding  She was reportedly out of breath when she came back yesterday possibly because of too much walking and exposure to cold weather but her pulse ox was reportedly within normal limits and she did not need any oxygen     She has not reported having cancer or dying from terrible illnesses or about having any micro chip or implant 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   She still has a tendency to complain about swallowing difficulties and breathing difficulty and some other psychosomatic symptoms off and on which comes and goes  She is eating most of her meals so far  I did remind her again  that she needs to keep up with her hygiene like taking showers twice a week and attending 50% of groups to be able to return back to the  personal care home at [facility] and she has stated that she will try to comply as that is where she wants to go     She did not complain about staff tampering with her oxygen concentrator or her inhaler lately    She still exhibits some stilted speech and grooming is fair today but was found laying back on bed in the morning as usual but got up and spoke to me without any hesitancy     Current medications:    Current Facility-Administered Medications:     acetaminophen (TYLENOL) tablet 650 mg, 650 mg, Oral, Q6H PRN, Isidoro Gonzalez MD    albuterol (PROVENTIL HFA,VENTOLIN HFA) inhaler 2 puff, 2 puff, Inhalation, Q4H PRN, Isidoro Gonzalez MD, 2 puff at 10/11/19 0424    aluminum-magnesium hydroxide-simethicone (MYLANTA) 200-200-20 mg/5 mL oral suspension 15 mL, 15 mL, Oral, Q4H PRN, Isidoro Gonzalez MD    ammonium lactate (LAC-HYDRIN) 12 % lotion 1 application, 1 application, Topical, BID PRN, Isidoro Gonzalez MD    benzonatate (TESSALON PERLES) capsule 100 mg, 100 mg, Oral, TID PRN, Isidoro Gonzalez MD    benztropine (COGENTIN) injection 1 mg, 1 mg, Intramuscular, Q8H PRN, Isidoro Gonzalez MD    carbamide peroxide (DEBROX) 6 5 % otic solution 5 drop, 5 drop, Left Ear, BID, Rona Roman MD, 5 drop at 01/19/20 0839    cloZAPine (CLOZARIL) tablet 25 mg, 25 mg, Oral, BID, Isidoro Gonzalez MD, 25 mg at 01/21/20 2047    cloZAPine (CLOZARIL) tablet 50 mg, 50 mg, Oral, BID, Isidoro Gonzalez MD, 50 mg at 01/21/20 2047    EPINEPHrine PF (ADRENALIN) 1 mg/mL injection 0 15 mg, 0 15 mg, Intramuscular, Once PRN, Isidoro Gonzalez MD    fluticasone-vilanterol (BREO ELLIPTA) 200-25 MCG/INH inhaler 1 puff, 1 puff, Inhalation, Daily, Isidoro Gonzalez MD, 1 puff at 01/22/20 0842    ketotifen (ZADITOR) 0 025 % ophthalmic solution 1 drop, 1 drop, Right Eye, BID PRN, Isidoro Gonzalez MD    levothyroxine tablet 125 mcg, 125 mcg, Oral, Early Morning, Isidoro Gonzalez MD, 125 mcg at 01/22/20 8996    magnesium hydroxide (MILK OF MAGNESIA) 400 mg/5 mL oral suspension 30 mL, 30 mL, Oral, Daily PRN, Isidoro Gonzalez MD    montelukast (SINGULAIR) tablet 10 mg, 10 mg, Oral, HS, Isidoro Gonzalez MD, 10 mg at 01/16/20 2108    OLANZapine (ZyPREXA) IM injection 5 mg, 5 mg, Intramuscular, Q8H PRN, Meredith Wesley MD    OLANZapine (ZyPREXA) tablet 5 mg, 5 mg, Oral, Q8H PRN, Meredith Wesley MD    ondansetron (ZOFRAN-ODT) dispersible tablet 4 mg, 4 mg, Oral, Q6H PRN, Meredith Wesley MD, 4 mg at 12/09/19 1757    pantoprazole (PROTONIX) EC tablet 40 mg, 40 mg, Oral, Early Morning, Meredith Wesley MD, 40 mg at 01/22/20 0086    polyethylene glycol (MIRALAX) packet 17 g, 17 g, Oral, Daily PRN, Meredith Wesley MD    polyvinyl alcohol (LIQUIFILM TEARS) 1 4 % ophthalmic solution 1 drop, 1 drop, Both Eyes, Q3H PRN, Meredith Wesley MD    sertraline (ZOLOFT) tablet 150 mg, 150 mg, Oral, Daily, Meredith Wesley MD, 150 mg at 01/22/20 7814    sucralfate (CARAFATE) oral suspension 1,000 mg, 1,000 mg, Oral, BID, Stiven Pryor MD, 1,000 mg at 01/22/20 1528    theophylline (JEF-24) 24 hr capsule 200 mg, 200 mg, Oral, Daily, Meredith Wesley MD, 200 mg at 01/22/20 5921    tiotropium [facility]) capsule for inhaler 18 mcg, 18 mcg, Inhalation, Daily, Meredith Wesley MD, 18 mcg at 01/22/20 0843    traZODone (DESYREL) tablet 25 mg, 25 mg, Oral, HS PRN, Meredith Wesley MD  Justification if on more than two antipsychotics:  Only on his clozapine  Side effects if any:  None    Risks , benefits, side effects and precautions of medications discussed with patient and reminded patient to let us know any problems with medications     Objective:     Vital Signs:  Vitals:    01/21/20 1900 01/21/20 2130 01/22/20 0622 01/22/20 0700   BP: 96/52   113/78   BP Location: Left arm   Left arm   Pulse: 98   90   Resp: 15   18   Temp: (!) 97 °F (36 1 °C)   97 8 °F (36 6 °C)   TempSrc: Temporal   Temporal   SpO2: 90% 94% 96%    Weight:       Height:         Appearance:  age appropriate, casually dressed and overweight older than stated age,well groomed today with combed hair wearing clean clothes found laying on bed but did get up and sat is sit up to talk to me   Behavior:  evasive and guarded with no psychosomatic complaints, friendly and pleasant and cooperative most of the time   Speech:  normal pitch and normal volume but circumstantial and tangential with stilted speech as usual    Mood:  anxious and dysthymic   Affect:  mood-congruent, elated and entitled at times   Thought Process:  goal directed and illogical slightly pressured and tangential and talks as if in a court of law   Thought Content:  No suspicious knows reported today about staff tampering with her oxygen concentrator or spirometer today  No current suicidal homicidal thoughts intent or plans verbalized at all  No  overt delusional beliefs reported or elicited today but she appears to have some underlying paranoia  Perceptual Disturbances: None and does not appear responding to internal stimuli    Risk Potential: Tendency to not care for herself    Sensorium:  person, place, time/date, situation, day of week, month of year and time   Cognition:  grossly intact with no deficits in recent or remote memory or immediate recall   Consciousness:  alert and awake    Attention: Intact concentration and attention span   Intellect: Considered to be at least of average intelligence   Insight:  limited but improving   Judgment: Improving slowly      Motor Activity: no abnormal movements         Recent Labs:  Results Reviewed     None          I/O Past 24 hours:  No intake/output data recorded  No intake/output data recorded  Assessment / Plan:     [facility]ffective [facility]      Reason for continued inpatient care:  Because of underlying paranoia and inability to care for herself on her own and multiple somatic complaints  Acceptance by patient:  Accepting  Willaim File in recovery:  About living in same personal care home at the [facility] once she feels better  Understanding of medications :  Has some understanding  Involved in reintegration process:   On off unit privileges now  Trusting in relatoinship with psychiatrist:  Trusting    Recommended Treatment:    Medication changes:  1) none today  Non-pharmacological treatments  1) continue with  individual therapy group therapy, milieu therapy and occupational therapy and milieu therapy involving multidisciplinary team approach with psychotherapist, case management, nursing, peer support services, art therapist etc using recovery principles and psycho-education about accepting illness and need for treatment   2) encourage to cooperate with behavior plan  3) encourage to attend to ADLs like taking showers and wearing clean clothes   4) Encourage to attend groups   5) see how she does on off unit privileges and see how she does with staff off grounds today and expectations discussed with her and she did verbalize an understanding,counseled not to smoke while on passes because of COPD, to consider pass with sister next week   Safety  1) Safety/communication plan established targeting dynamic risk factors above  Discharge Plan back to the [facility] with act services and withdraw the [facility] referral due to improvement made so far  Counseling / Coordination of Care    Total floor / unit time spent today 15 minutes  Greater than 50% of total time was spent with the patient and / or family counseling and / or coordination of care  A description of the counseling / coordination of care  Patient's Rights, confidentiality and exceptions to confidentiality, use of automated medical record, [facility] staff access to medical record, and consent to treatment reviewed      Ervin Little MD

## 2020-01-22 NOTE — PROGRESS NOTES
01/21/20 1500   Activity/Group Checklist   Group   (Recovery Workshop )   Attendance Did not attend   Attendance Duration (min) 16-30   Affect/Mood IRMA

## 2020-01-22 NOTE — PROGRESS NOTES
01/22/20 1400   Activity/Group Checklist   Group   (Recovery Anonymous)   Attendance Did not attend   Attendance Duration (min) Greater than 60   Affect/Mood IRMA

## 2020-01-22 NOTE — PLAN OF CARE
Problem: Alteration in Thoughts and Perception  Goal: Verbalize thoughts and feelings  Description  Interventions:  - Promote a nonjudgmental and trusting relationship with the patient through active listening and therapeutic communication  - Assess patient's level of functioning, behavior and potential for risk  - Engage patient in 1 on 1 interactions for a minimum of 15 minutes each session  - Encourage patient to express fears, feelings, frustrations, and discuss symptoms    - Counselor patient to reality, help patient recognize reality-based thinking   - Administer medications as ordered and assess for potential side effects  - Provide the patient education related to the signs and symptoms of the illness and desired effects of prescribed medications  Outcome: Progressing  Goal: Complete daily ADLs, including personal hygiene independently, as able  Description  Interventions:  - Observe, teach, and assist patient with ADLS  - Monitor and promote a balance of rest/activity, with adequate nutrition and elimination   Outcome: Progressing     Problem: Depression  Goal: Refrain from isolation  Description  Interventions:  - Develop a trusting relationship   - Encourage socialization   Outcome: Progressing     Problem: Alteration in Orientation  Goal: Interact with staff daily  Description  Interventions:  - Assess and re-assess patient's level of orientation  - Engage patient in 1 on 1 interactions, daily, for a minimum of 15 minutes   - Establish rapport/trust with patient   Outcome: Progressing     Problem: SAFETY ADULT  Goal: Patient will remain free of falls  Description  INTERVENTIONS:  - Assess patient frequently for physical needs  -  Identify cognitive and physical deficits and behaviors that affect risk of falls    -  [facility] fall precautions as indicated by assessment   - Educate patient/family on patient safety including physical limitations  - Instruct patient to call for assistance with activity based on assessment  - Modify environment to reduce risk of injury  - Consider OT/PT consult to assist with strengthening/mobility  Outcome: Progressing     Problem: Alteration in Thoughts and Perception  Goal: Attend and participate in unit activities, including therapeutic, recreational, and educational groups  Description  Interventions:  - Provide therapeutic and educational activities daily, encourage attendance and participation, and document same in the medical record     CERTIFIED PEER SPECIALIST INTERVENTIONS:    Complete peer assessment with patient to assess their needs and identify their goals to complete while in the recovery program as well as once discharged into the community  Patient will complete WRAP Plan, Crisis Plan and [facility] Domains  Patient will attend 50% of groups offered on the unit  Patient will complete a goal card weekly  Outcome: Not Progressing    Tu Loser has been visible in the milieu and dining room intermittently  Social with staff and select peers  Able to make needs known  No somatic complaints  Naps at intervals  Took medication with prompting  Ate 25% of her meal and drank 100% of Ensure  Disheveled appearance  No shower this shift  Did not attend evening group  Ate snack  Oxygen saturation 92% prior to oxygen 1 liter applied via nasal cannula  Continue to monitor  Precautions maintained

## 2020-01-22 NOTE — PROGRESS NOTES
01/22/20 1100   Activity/Group Checklist   Group   (IMR/Cognitive Distortions )   Attendance Did not attend   Attendance Duration (min) 46-60   Affect/Mood IRMA

## 2020-01-22 NOTE — PLAN OF CARE
Problem: Alteration in Thoughts and Perception  Goal: Verbalize thoughts and feelings  Description  Interventions:  - Promote a nonjudgmental and trusting relationship with the patient through active listening and therapeutic communication  - Assess patient's level of functioning, behavior and potential for risk  - Engage patient in 1 on 1 interactions for a minimum of 15 minutes each session  - Encourage patient to express fears, feelings, frustrations, and discuss symptoms    - [place] patient to reality, help patient recognize reality-based thinking   - Administer medications as ordered and assess for potential side effects  - Provide the patient education related to the signs and symptoms of the illness and desired effects of prescribed medications  Outcome: Progressing  Goal: Agree to be compliant with medication regime, as prescribed and report medication side effects  Description  Interventions:  - Offer appropriate PRN medication and supervise ingestion; conduct aims, as needed   Outcome: Progressing     Problem: Ineffective Coping  Goal: Patient/Family verbalizes awareness of resources  Outcome: Progressing  Goal: Understands least restrictive measures  Description  Interventions:  - Utilize least restrictive behavior  Outcome: Progressing     Problem: Risk for Self Injury/Neglect  Goal: Treatment Goal: Remain safe during length of stay, learn and adopt new coping skills, and be free of self-injurious ideation, impulses and acts at the time of discharge  Outcome: Progressing  Goal: Verbalize thoughts and feelings  Description  Interventions:  - Assess and re-assess patient's lethality and potential for self-injury  - Engage patient in 1:1 interactions, daily, for a minimum of 15 minutes  - Encourage patient to express feelings, fears, frustrations, hopes  - Establish rapport/trust with patient   Outcome: Progressing  Goal: Refrain from harming self  Description  Interventions:  - Monitor patient closely, per order  - Develop a trusting relationship  - Supervise medication ingestion, monitor effects and side effects   Outcome: Progressing     Problem: Depression  Goal: Verbalize thoughts and feelings  Description  Interventions:  - Assess and re-assess patient's level of risk   - Engage patient in 1:1 interactions, daily, for a minimum of 15 minutes   - Encourage patient to express feelings, fears, frustrations, hopes   Outcome: Progressing     Problem: Anxiety  Goal: Anxiety is at manageable level  Description  Interventions:  - Assess and monitor patient's anxiety level  - Monitor for signs and symptoms of anxiety both physical and emotional (heart palpitations, chest pain, shortness of breath, headaches, nausea, feeling jumpy, restlessness, irritable, apprehensive)  - Collaborate with interdisciplinary team and initiate plan and interventions as ordered    - [place] patient to unit/surroundings  - Explain treatment plan  - Encourage participation in care  - Encourage verbalization of concerns/fears  - Identify coping mechanisms  - Assist in developing anxiety-reducing skills  - Administer/offer alternative therapies  - Limit or eliminate stimulants  Outcome: Progressing     Problem: Alteration in Orientation  Goal: Interact with staff daily  Description  Interventions:  - Assess and re-assess patient's level of orientation  - Engage patient in 1 on 1 interactions, daily, for a minimum of 15 minutes   - Establish rapport/trust with patient   Outcome: Progressing     Problem: PAIN - ADULT  Goal: Verbalizes/displays adequate comfort level or baseline comfort level  Description  Interventions:  - Encourage patient to monitor pain and request assistance  - Assess pain using appropriate pain scale  - Administer analgesics based on type and severity of pain and evaluate response  - Implement non-pharmacological measures as appropriate and evaluate response  - Consider cultural and social influences on pain and pain management  - Notify physician/advanced practitioner if interventions unsuccessful or patient reports new pain  Outcome: Progressing     Problem: SAFETY ADULT  Goal: Patient will remain free of falls  Description  INTERVENTIONS:  - Assess patient frequently for physical needs  -  Identify cognitive and physical deficits and behaviors that affect risk of falls  -  [place] fall precautions as indicated by assessment   - Educate patient/family on patient safety including physical limitations  - Instruct patient to call for assistance with activity based on assessment  - Modify environment to reduce risk of injury  - Consider OT/PT consult to assist with strengthening/mobility  Outcome: Progressing     Problem: Nutrition/Hydration-ADULT  Goal: Nutrient/Hydration intake appropriate for improving, restoring or maintaining nutritional needs  Description  Monitor and assess patient's nutrition/hydration status for malnutrition  Collaborate with interdisciplinary team and initiate plan and interventions as ordered  Monitor patient's weight and dietary intake as ordered or per policy  Utilize nutrition screening tool and intervene as necessary  Determine patient's food preferences and provide high-protein, high-caloric foods as appropriate       INTERVENTIONS:  - Monitor oral intake, urinary output, labs, and treatment plans  - Assess nutrition and hydration status and recommend course of action  - Evaluate amount of meals eaten  - Assist patient with eating if necessary   - Allow adequate time for meals  - Recommend/ encourage appropriate diets, oral nutritional supplements, and vitamin/mineral supplements  - Order, calculate, and assess calorie counts as needed  - Recommend, monitor, and adjust tube feedings and TPN/PPN based on assessed needs  - Assess need for intravenous fluids  - Provide specific nutrition/hydration education as appropriate  - Include patient/family/caregiver in decisions related to nutrition  Outcome: Progressing     Problem: Alteration in Thoughts and Perception  Goal: Attend and participate in unit activities, including therapeutic, recreational, and educational groups  Description  Interventions:  - Provide therapeutic and educational activities daily, encourage attendance and participation, and document same in the medical record     CERTIFIED PEER SPECIALIST INTERVENTIONS:    Complete peer assessment with patient to assess their needs and identify their goals to complete while in the recovery program as well as once discharged into the community  Patient will complete WRAP Plan, Crisis Plan and [facility]  Patient will attend 50% of groups offered on the unit  Patient will complete a goal card weekly  Outcome: Not Progressing     Problem: Ineffective Coping  Goal: Participates in unit activities  Description  Interventions:  - Provide therapeutic environment   - Provide required programming   - Redirect inappropriate behaviors   Outcome: Not Progressing     Problem: Risk for Self Injury/Neglect  Goal: Attend and participate in unit activities, including therapeutic, recreational, and educational groups  Description  Interventions:  - Provide therapeutic and educational activities daily, encourage attendance and participation, and document same in the medical record  - Obtain collateral information, encourage visitation and family involvement in care   Outcome: Not Progressing     Problem: Depression  Goal: Refrain from isolation  Description  Interventions:  - Develop a trusting relationship   - Encourage socialization   Outcome: Not Progressing   Mostly isolative to her bed, out for meds and meals, did not attend any groups  No somatic complaints voiced this shift  Ate 50% of breakfast and 100% of lunch  No issues noted  Continue to monitor

## 2020-01-23 NOTE — ASSESSMENT & PLAN NOTE
Psychiatry Progress Note  Patient now admits that  she has to follow instructions when taken out on therapeutic outings with staff or family  She has not reported having cancer or dying from terrible illnesses or about having any micro chip or implant 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 in several weeks now  She still has a tendency to complain about swallowing difficulties and breathing difficulty and some other psychosomatic symptoms off and on which comes and goes but has been eating most of her meals so far  I he has verbalized an understanding that she needs to keep up with her hygiene like taking showers twice a week and attending 50% of groups to be able to return back to the  personal care home at [address] were she wants to go  She did not complain about staff tampering with her oxygen concentrator or her inhaler lately but continues to talk stilted speech   Her grooming is fair today but was found laying back on bed in the morning as usual but got up and spoke to me without any hesitancy and she has not taken  showers in last 2 days but plans to take one today     Current medications:    Current Facility-Administered Medications:     acetaminophen (TYLENOL) tablet 650 mg, 650 mg, Oral, Q6H PRN, Meredith Wesley MD    albuterol (PROVENTIL HFA,VENTOLIN HFA) inhaler 2 puff, 2 puff, Inhalation, Q4H PRN, Meredith Wesley MD, 2 puff at 10/11/19 0424    aluminum-magnesium hydroxide-simethicone (MYLANTA) 200-200-20 mg/5 mL oral suspension 15 mL, 15 mL, Oral, Q4H PRN, Meredith Wesley MD    ammonium lactate (LAC-HYDRIN) 12 % lotion 1 application, 1 application, Topical, BID PRN, Meredith Wesley MD    benzonatate (TESSALON PERLES) capsule 100 mg, 100 mg, Oral, TID PRN, Meredith Wesley MD    benztropine (COGENTIN) injection 1 mg, 1 mg, Intramuscular, Q8H PRN, Meredith Wesley MD    carbamide peroxide [place] [facility]) 6 5 % otic solution 5 drop, 5 drop, Left Ear, BID, Rona Conte MD, 5 drop at 01/19/20 0839    cloZAPine (CLOZARIL) tablet 25 mg, 25 mg, Oral, BID, Jaz Beasley MD, 25 mg at 01/22/20 2129    cloZAPine (CLOZARIL) tablet 50 mg, 50 mg, Oral, BID, Jaz Beasley MD, 50 mg at 01/22/20 2129    EPINEPHrine PF (ADRENALIN) 1 mg/mL injection 0 15 mg, 0 15 mg, Intramuscular, Once PRN, Jaz Beasley MD    fluticasone-vilanterol (BREO ELLIPTA) 200-25 MCG/INH inhaler 1 puff, 1 puff, Inhalation, Daily, Jaz Beasley MD, 1 puff at 01/22/20 0842    ketotifen (ZADITOR) 0 025 % ophthalmic solution 1 drop, 1 drop, Right Eye, BID PRN, Jaz Beasley MD    levothyroxine tablet 125 mcg, 125 mcg, Oral, Early Morning, Jaz Beasley MD, 125 mcg at 01/23/20 0605    magnesium hydroxide (MILK OF MAGNESIA) 400 mg/5 mL oral suspension 30 mL, 30 mL, Oral, Daily PRN, Jaz Beasley MD    montelukast (SINGULAIR) tablet 10 mg, 10 mg, Oral, HS, Jaz Beasley MD, 10 mg at 01/16/20 2106    OLANZapine (ZyPREXA) IM injection 5 mg, 5 mg, Intramuscular, Q8H PRN, Jaz Beasley MD    OLANZapine (ZyPREXA) tablet 5 mg, 5 mg, Oral, Q8H PRN, Jaz Beasley MD    ondansetron (ZOFRAN-ODT) dispersible tablet 4 mg, 4 mg, Oral, Q6H PRN, Jaz Beasley MD, 4 mg at 12/09/19 1757    pantoprazole (PROTONIX) EC tablet 40 mg, 40 mg, Oral, Early Morning, Jaz Beasley MD, 40 mg at 01/23/20 0605    polyethylene glycol (MIRALAX) packet 17 g, 17 g, Oral, Daily PRN, Jaz Beasley MD    polyvinyl alcohol (LIQUIFILM TEARS) 1 4 % ophthalmic solution 1 drop, 1 drop, Both Eyes, Q3H PRN, Jaz Beasley MD    sertraline (ZOLOFT) tablet 150 mg, 150 mg, Oral, Daily, Jaz Beasley MD, 150 mg at 01/22/20 6277    sucralfate (CARAFATE) oral suspension 1,000 mg, 1,000 mg, Oral, BID, Cat Torres MD, 1,000 mg at 01/23/20 0605    theophylline (JEF-24) 24 hr capsule 200 mg, 200 mg, Oral, Daily, Jaz Beasley MD, 200 mg at 01/22/20 0842    tiotropium [facility]) capsule for inhaler 18 mcg, 18 mcg, Inhalation, Daily, Jaz Beasley MD, 18 mcg at 01/22/20 0843    traZODone (DESYREL) tablet 25 mg, 25 mg, Oral, HS Tree RETANA MD  Justification if on more than two antipsychotics:  Only on his clozapine  Side effects if any:  None    Risks , benefits, side effects and precautions of medications discussed with patient and reminded patient to let us know any problems with medications     Objective:     Vital Signs:  Vitals:    01/22/20 0700 01/22/20 1600 01/22/20 1900 01/23/20 0500   BP: 113/78 103/63 90/52    BP Location: Left arm Left arm Left arm    Pulse: 90 78 102    Resp: 18 16 14    Temp: 97 8 °F (36 6 °C) 99 3 °F (37 4 °C) 98 °F (36 7 °C)    TempSrc: Temporal Temporal Temporal    SpO2:  93% 94% 93%   Weight:       Height:         Appearance:  age appropriate, casually dressed and overweight older than stated age,well groomed today with combed hair wearing clean clothes found laying on bed but did  talk to me by getting up    Behavior:  evasive and guarded with no psychosomatic complaints, friendly and pleasant and cooperative most of the time   Speech:  normal pitch and normal volume but circumstantial and tangential with stilted speech as usual    Mood:  anxious and dysthymic   Affect:  mood-congruent, elated and entitled at times   Thought Process:  goal directed and illogical slightly pressured and tangential and talks as if in a court of law   Thought Content:  Still with some suspiciousness, No current suicidal homicidal thoughts intent or plans verbalized at all  No  overt delusional beliefs reported or elicited today but she appears to have some underlying paranoia and some psychosomatic sxs and now accepting the fact she needs to stick with original plans as to where to go on therapeutic passes     Perceptual Disturbances: None and does not appear responding to internal stimuli    Risk Potential: Tendency to not care for herself    Sensorium:  person, place, time/date, situation, day of week, month of year and time   Cognition:  grossly intact with no deficits in recent or remote memory or immediate recall Consciousness:  alert and awake    Attention: Intact concentration and attention span   Intellect: Considered to be at least of average intelligence   Insight:  limited but improving   Judgment: Improving slowly      Motor Activity: no abnormal movements         Recent Labs:  Results Reviewed     None          I/O Past 24 hours:  No intake/output data recorded  No intake/output data recorded  Assessment / Plan:     Schizoaffective disorder, bipolar type ([facility] [address] )      Reason for continued inpatient care:  Because of underlying paranoia and inability to care for herself on her own and multiple somatic complaints  Acceptance by patient:  Accepting  [place] in recovery:  About living in same personal care home at the [place] once she feels better  Understanding of medications :  Has some understanding  Involved in reintegration process: On off unit privileges now  Trusting in relatoinship with psychiatrist:  Trusting    Recommended Treatment:    Medication changes:  1) none today  Non-pharmacological treatments  1) continue with  individual therapy group therapy, milieu therapy and occupational therapy and milieu therapy involving multidisciplinary team approach with psychotherapist, case management, nursing, peer support services, art therapist etc using recovery principles and psycho-education about accepting illness and need for treatment   2) encourage to cooperate with behavior plan  3) encourage to attend to ADLs like taking showers and wearing clean clothes   4) Encourage to attend groups   5) see how she does on off unit privileges and see how she does with staff off grounds again and expectations discussed with her and she did verbalize an understanding,counseled not to smoke while on passes because of COPD, to consider pass with sister in 2 weeks now  Safety  1) Safety/communication plan established targeting dynamic risk factors above    [facility] and withdraw the [facility] referral due to improvement made so far  Counseling / Coordination of Care    Total floor / unit time spent today 15 minutes  Greater than 50% of total time was spent with the patient and / or family counseling and / or coordination of care  A description of the counseling / coordination of care  Patient's Rights, confidentiality and exceptions to confidentiality, use of automated medical record, [facility] staff access to medical record, and consent to treatment reviewed      Dalton Garner MD

## 2020-01-23 NOTE — PLAN OF CARE
Problem: Alteration in Thoughts and Perception  Goal: Treatment Goal: Gain control of psychotic behaviors/thinking, reduce/eliminate presenting symptoms and demonstrate improved reality functioning upon discharge  Outcome: Progressing  Goal: Verbalize thoughts and feelings  Description  Interventions:  - Promote a nonjudgmental and trusting relationship with the patient through active listening and therapeutic communication  - Assess patient's level of functioning, behavior and potential for risk  - Engage patient in 1 on 1 interactions for a minimum of 15 minutes each session  - Encourage patient to express fears, feelings, frustrations, and discuss symptoms    - [place] patient to reality, help patient recognize reality-based thinking   - Administer medications as ordered and assess for potential side effects  - Provide the patient education related to the signs and symptoms of the illness and desired effects of prescribed medications  Outcome: Progressing  Goal: Agree to be compliant with medication regime, as prescribed and report medication side effects  Description  Interventions:  - Offer appropriate PRN medication and supervise ingestion; conduct aims, as needed   Outcome: Progressing  Goal: Attend and participate in unit activities, including therapeutic, recreational, and educational groups  Description  Interventions:  - Provide therapeutic and educational activities daily, encourage attendance and participation, and document same in the medical record     CERTIFIED PEER SPECIALIST INTERVENTIONS:    Complete peer assessment with patient to assess their needs and identify their goals to complete while in the recovery program as well as once discharged into the community  Patient will complete WRAP Plan, Crisis Plan and 5 Life Domains  Patient will attend 50% of groups offered on the unit  Patient will complete a goal card weekly      Outcome: Progressing     Problem: Ineffective Coping  Goal: Participates in unit activities  Description  Interventions:  - Provide therapeutic environment   - Provide required programming   - Redirect inappropriate behaviors   Outcome: Progressing  Goal: Patient/Family verbalizes awareness of resources  Outcome: Progressing  Goal: Understands least restrictive measures  Description  Interventions:  - Utilize least restrictive behavior  Outcome: Progressing     Problem: Risk for Self Injury/Neglect  Goal: Treatment Goal: Remain safe during length of stay, learn and adopt new coping skills, and be free of self-injurious ideation, impulses and acts at the time of discharge  Outcome: Progressing  Goal: Verbalize thoughts and feelings  Description  Interventions:  - Assess and re-assess patient's lethality and potential for self-injury  - Engage patient in 1:1 interactions, daily, for a minimum of 15 minutes  - Encourage patient to express feelings, fears, frustrations, hopes  - Establish rapport/trust with patient   Outcome: Progressing  Goal: Refrain from harming self  Description  Interventions:  - Monitor patient closely, per order  - Develop a trusting relationship  - Supervise medication ingestion, monitor effects and side effects   Outcome: Progressing  Goal: Attend and participate in unit activities, including therapeutic, recreational, and educational groups  Description  Interventions:  - Provide therapeutic and educational activities daily, encourage attendance and participation, and document same in the medical record  - Obtain collateral information, encourage visitation and family involvement in care   Outcome: Progressing     Problem: Depression  Goal: Treatment Goal: Demonstrate behavioral control of depressive symptoms, verbalize feelings of improved mood/affect, and adopt new coping skills prior to discharge  Outcome: Progressing  Goal: Verbalize thoughts and feelings  Description  Interventions:  - Assess and re-assess patient's level of risk   - Engage patient in 1:1 interactions, daily, for a minimum of 15 minutes   - Encourage patient to express feelings, fears, frustrations, hopes   Outcome: Progressing  Goal: Refrain from isolation  Description  Interventions:  - Develop a trusting relationship   - Encourage socialization   Outcome: Progressing     Problem: Anxiety  Goal: Anxiety is at manageable level  Description  Interventions:  - Assess and monitor patient's anxiety level  - Monitor for signs and symptoms of anxiety both physical and emotional (heart palpitations, chest pain, shortness of breath, headaches, nausea, feeling jumpy, restlessness, irritable, apprehensive)  - Collaborate with interdisciplinary team and initiate plan and interventions as ordered    - [place] patient to unit/surroundings  - Explain treatment plan  - Encourage participation in care  - Encourage verbalization of concerns/fears  - Identify coping mechanisms  - Assist in developing anxiety-reducing skills  - Administer/offer alternative therapies  - Limit or eliminate stimulants  Outcome: Progressing     Problem: Alteration in Orientation  Goal: Interact with staff daily  Description  Interventions:  - Assess and re-assess patient's level of orientation  - Engage patient in 1 on 1 interactions, daily, for a minimum of 15 minutes   - Establish rapport/trust with patient   Outcome: Progressing     Problem: PAIN - ADULT  Goal: Verbalizes/displays adequate comfort level or baseline comfort level  Description  Interventions:  - Encourage patient to monitor pain and request assistance  - Assess pain using appropriate pain scale  - Administer analgesics based on type and severity of pain and evaluate response  - Implement non-pharmacological measures as appropriate and evaluate response  - Consider cultural and social influences on pain and pain management  - Notify physician/advanced practitioner if interventions unsuccessful or patient reports new pain  Outcome: Progressing     Problem: SAFETY ADULT  Goal: Patient will remain free of falls  Description  INTERVENTIONS:  - Assess patient frequently for physical needs  -  Identify cognitive and physical deficits and behaviors that affect risk of falls  -  [place] fall precautions as indicated by assessment   - Educate patient/family on patient safety including physical limitations  - Instruct patient to call for assistance with activity based on assessment  - Modify environment to reduce risk of injury  - Consider OT/PT consult to assist with strengthening/mobility  Outcome: Progressing     Problem: Nutrition/Hydration-ADULT  Goal: Nutrient/Hydration intake appropriate for improving, restoring or maintaining nutritional needs  Description  Monitor and assess patient's nutrition/hydration status for malnutrition  Collaborate with interdisciplinary team and initiate plan and interventions as ordered  Monitor patient's weight and dietary intake as ordered or per policy  Utilize nutrition screening tool and intervene as necessary  Determine patient's food preferences and provide high-protein, high-caloric foods as appropriate  INTERVENTIONS:  - Monitor oral intake, urinary output, labs, and treatment plans  - Assess nutrition and hydration status and recommend course of action  - Evaluate amount of meals eaten  - Assist patient with eating if necessary   - Allow adequate time for meals  - Recommend/ encourage appropriate diets, oral nutritional supplements, and vitamin/mineral supplements  - Order, calculate, and assess calorie counts as needed  - Recommend, monitor, and adjust tube feedings and TPN/PPN based on assessed needs  - Assess need for intravenous fluids  - Provide specific nutrition/hydration education as appropriate  - Include patient/family/caregiver in decisions related to nutrition  Outcome: Progressing   Mostly isolative to her bed, out for meds and meals, attended IMR group  No somatic complaints voiced this shift   Ate 75% of breakfast and 10% of lunch  No issues noted  Continue to monitor

## 2020-01-23 NOTE — PROGRESS NOTES
01/23/20 0800   Team Meeting   Meeting Type Daily Rounds   Team Members Present   Team Members Present Physician;Nurse;; Other (Discipline and Name)   Physician Team Member Dr Karissa Mcdowell, RN   Care Management Team Member Brenda Donis   Other (Discipline and Name) Janee Yanes LCSW; SHANIKA Humphrey     Patient went to 3-11 groups only  She is asking staff to walk her to the lunchroom again  Slept

## 2020-01-23 NOTE — PROGRESS NOTES
Progress Note - Veronica Romero 1955, 60 y o  female MRN: [id number]    Unit/Bed#: [facility]IO A[facility] 110-02 Encounter: [id number]    Primary Care Provider: Faustino Madsen PA-C   Date and time admitted to hospital: 7/23/2019  5:30 PM        * Schizoaffective disorder, bipolar type [facility])  Assessment & Plan  Psychiatry Progress Note  Patient now admits that  she has to follow instructions when taken out on therapeutic outings with staff or family  She has not reported having cancer or dying from terrible illnesses or about having any micro chip or implant 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 in several weeks now  She still has a tendency to complain about swallowing difficulties and breathing difficulty and some other psychosomatic symptoms off and on which comes and goes but has been eating most of her meals so far  I he has verbalized an understanding that she needs to keep up with her hygiene like taking showers twice a week and attending 50% of groups to be able to return back to the  personal care home at [address] were she wants to go  She did not complain about staff tampering with her oxygen concentrator or her inhaler lately but continues to talk stilted speech   Her grooming is fair today but was found laying back on bed in the morning as usual but got up and spoke to me without any hesitancy and she has not taken  showers in last 2 days but plans to take one today     Current medications:    Current Facility-Administered Medications:     acetaminophen (TYLENOL) tablet 650 mg, 650 mg, Oral, Q6H PRN, Deep Durand MD    albuterol (PROVENTIL HFA,VENTOLIN HFA) inhaler 2 puff, 2 puff, Inhalation, Q4H PRN, Deep Durand MD, 2 puff at 10/11/19 0424    aluminum-magnesium hydroxide-simethicone (MYLANTA) 200-200-20 mg/5 mL oral suspension 15 mL, 15 mL, Oral, Q4H PRN, Deep Durand MD    ammonium lactate (LAC-HYDRIN) 12 % lotion 1 application, 1 application, Topical, BID PRN, Deep Durand MD    benzonatate [facility]) capsule 100 mg, 100 mg, Oral, TID PRN, Isidoro Gonzalez MD    benztropine (COGENTIN) injection 1 mg, 1 mg, Intramuscular, Q8H PRN, Isidoro Gonzalez MD    carbamide peroxide (DEBROX) 6 5 % otic solution 5 drop, 5 drop, Left Ear, BID, Rona Roman MD, 5 drop at 01/19/20 0839    cloZAPine (CLOZARIL) tablet 25 mg, 25 mg, Oral, BID, Isidoro Gonzalez MD, 25 mg at 01/22/20 2129    cloZAPine (CLOZARIL) tablet 50 mg, 50 mg, Oral, BID, Isidoro Gonzalez MD, 50 mg at 01/22/20 2129    EPINEPHrine PF (ADRENALIN) 1 mg/mL injection 0 15 mg, 0 15 mg, Intramuscular, Once PRN, Isidoro Gonzalez MD    fluticasone-vilanterol (BREO ELLIPTA) 200-25 MCG/INH inhaler 1 puff, 1 puff, Inhalation, Daily, Isidoro Gonzalez MD, 1 puff at 01/22/20 0842    ketotifen (ZADITOR) 0 025 % ophthalmic solution 1 drop, 1 drop, Right Eye, BID PRN, Isidoro Gonzalez MD    levothyroxine tablet 125 mcg, 125 mcg, Oral, Early Morning, Isidoro Gonzalez MD, 125 mcg at 01/23/20 0605    magnesium hydroxide (MILK OF MAGNESIA) 400 mg/5 mL oral suspension 30 mL, 30 mL, Oral, Daily PRN, Isidoro Gonzalez MD    montelukast (SINGULAIR) tablet 10 mg, 10 mg, Oral, HS, Isidoro Gonzalez MD, 10 mg at 01/16/20 2106    OLANZapine (ZyPREXA) IM injection 5 mg, 5 mg, Intramuscular, Q8H PRN, Isidoro Gonzalez MD    OLANZapine (ZyPREXA) tablet 5 mg, 5 mg, Oral, Q8H PRN, Isidoro Gonzalez MD    ondansetron (ZOFRAN-ODT) dispersible tablet 4 mg, 4 mg, Oral, Q6H PRN, Isidoro Gonzalez MD, 4 mg at 12/09/19 1757    pantoprazole (PROTONIX) EC tablet 40 mg, 40 mg, Oral, Early Morning, Isidoro Gonzalez MD, 40 mg at 01/23/20 0605    polyethylene glycol (MIRALAX) packet 17 g, 17 g, Oral, Daily PRN, Isidoro Gonzalez MD    polyvinyl alcohol (LIQUIFILM TEARS) 1 4 % ophthalmic solution 1 drop, 1 drop, Both Eyes, Q3H PRN, Isidoro Gonzalez MD    sertraline (ZOLOFT) tablet 150 mg, 150 mg, Oral, Daily, Isidoro Gonzalez MD, 150 mg at 01/22/20 0842    sucralfate (CARAFATE) oral suspension 1,000 mg, 1,000 mg, Oral, BID, Arin Parker MD, 1,000 mg at 01/23/20 0605    theophylline (JEF-24) 24 hr capsule 200 mg, 200 mg, Oral, Daily, Shi Pham MD, 200 mg at 01/22/20 1161    tiotropium [facility]) capsule for inhaler 18 mcg, 18 mcg, Inhalation, Daily, Shi Pham MD, 18 mcg at 01/22/20 0843    traZODone (DESYREL) tablet 25 mg, 25 mg, Oral, HS PRN, Shi Pham MD  Justification if on more than two antipsychotics:  Only on his clozapine  Side effects if any:  None    Risks , benefits, side effects and precautions of medications discussed with patient and reminded patient to let us know any problems with medications     Objective:     Vital Signs:  Vitals:    01/22/20 0700 01/22/20 1600 01/22/20 1900 01/23/20 0500   BP: 113/78 103/63 90/52    BP Location: Left arm Left arm Left arm    Pulse: 90 78 102    Resp: 18 16 14    Temp: 97 8 °F (36 6 °C) 99 3 °F (37 4 °C) 98 °F (36 7 °C)    TempSrc: Temporal Temporal Temporal    SpO2:  93% 94% 93%   Weight:       Height:         Appearance:  age appropriate, casually dressed and overweight older than stated age,well groomed today with combed hair wearing clean clothes found laying on bed but did  talk to me by getting up    Behavior:  evasive and guarded with no psychosomatic complaints, friendly and pleasant and cooperative most of the time   Speech:  normal pitch and normal volume but circumstantial and tangential with stilted speech as usual    Mood:  anxious and dysthymic   Affect:  mood-congruent, elated and entitled at times   Thought Process:  goal directed and illogical slightly pressured and tangential and talks as if in a court of law   Thought Content:  Still with some suspiciousness, No current suicidal homicidal thoughts intent or plans verbalized at all  No  overt delusional beliefs reported or elicited today but she appears to have some underlying paranoia and some psychosomatic sxs and now accepting the fact she needs to stick with original plans as to where to go on therapeutic passes     Perceptual Disturbances: None and does not appear responding to internal stimuli    Risk Potential: Tendency to not care for herself    Sensorium:  person, place, time/date, situation, day of week, month of year and time   Cognition:  grossly intact with no deficits in recent or remote memory or immediate recall   Consciousness:  alert and awake    Attention: Intact concentration and attention span   Intellect: Considered to be at least of average intelligence   Insight:  limited but improving   Judgment: Improving slowly      Motor Activity: no abnormal movements         Recent Labs:  Results Reviewed     None          I/O Past 24 hours:  No intake/output data recorded  No intake/output data recorded  Assessment / Plan:     [facility]ffect[facility]      Reason for continued inpatient care:  Because of underlying paranoia and inability to care for herself on her own and multiple somatic complaints  Acceptance by patient:  Accepting  Colette Castrejon in recovery:  About living in same personal care home at the [facility] once she feels better  Understanding of medications :  Has some understanding  Involved in reintegration process: On off unit privileges now  Trusting in relatoinship with psychiatrist:  Trusting    Recommended Treatment:    Medication changes:  1) none today  Non-pharmacological treatments  1) continue with  individual therapy group therapy, milieu therapy and occupational therapy and milieu therapy involving multidisciplinary team approach with psychotherapist, case management, nursing, peer support services, art therapist etc using recovery principles and psycho-education about accepting illness and need for treatment     2) encourage to cooperate with behavior plan  3) encourage to attend to ADLs like taking showers and wearing clean clothes   4) Encourage to attend groups   5) see how she does on off unit privileges and see how she does with staff off grounds again and expectations discussed with her and she did verbalize an understanding,counseled not to smoke while on passes because of COPD, to consider pass with sister in 2 weeks now  Safety  1) Safety/communication plan established targeting dynamic risk factors above  Discharge Plan back to the [facility] with act services and withdraw the [facility] referral due to improvement made so far  Counseling / Coordination of Care    Total floor / unit time spent today 15 minutes  Greater than 50% of total time was spent with the patient and / or family counseling and / or coordination of care  A description of the counseling / coordination of care  Patient's Rights, confidentiality and exceptions to confidentiality, use of automated medical record, [employer] staff access to medical record, and consent to treatment reviewed      Sindy Day MD

## 2020-01-23 NOTE — PROGRESS NOTES
01/23/20 1100   Activity/Group Checklist   Group   (IMR/Effective Decision Making )   Attendance Attended   Attendance Duration (min) 46-60   Interactions Interacted appropriately   Affect/Mood Appropriate   Goals Achieved Identified feelings; Able to listen to others; Able to engage in interactions; Able to self-disclose; Able to recieve feedback; Able to give feedback to another

## 2020-01-23 NOTE — PLAN OF CARE
Problem: Alteration in Thoughts and Perception  Goal: Verbalize thoughts and feelings  Description  Interventions:  - Promote a nonjudgmental and trusting relationship with the patient through active listening and therapeutic communication  - Assess patient's level of functioning, behavior and potential for risk  - Engage patient in 1 on 1 interactions for a minimum of 15 minutes each session  - Encourage patient to express fears, feelings, frustrations, and discuss symptoms    - [place] patient to reality, help patient recognize reality-based thinking   - Administer medications as ordered and assess for potential side effects  - Provide the patient education related to the signs and symptoms of the illness and desired effects of prescribed medications  Outcome: Progressing  Goal: Agree to be compliant with medication regime, as prescribed and report medication side effects  Description  Interventions:  - Offer appropriate PRN medication and supervise ingestion; conduct aims, as needed   Outcome: Progressing  Goal: Attend and participate in unit activities, including therapeutic, recreational, and educational groups  Description  Interventions:  - Provide therapeutic and educational activities daily, encourage attendance and participation, and document same in the medical record     CERTIFIED PEER SPECIALIST INTERVENTIONS:    Complete peer assessment with patient to assess their needs and identify their goals to complete while in the recovery program as well as once discharged into the community  Patient will complete WRAP Plan, Crisis Plan and 5 Life Domains  Patient will attend 50% of groups offered on the unit  Patient will complete a goal card weekly      Outcome: Progressing     Problem: Depression  Goal: Refrain from isolation  Description  Interventions:  - Develop a trusting relationship   - Encourage socialization   Outcome: Not Progressing     [address] has been isolative to her room in free time  She is pleasant; reviews having enjoyed brief pass yesterday, but, "I have to do it again"  Explains because disobeyed going to original designated pass destination, must demonstrate that can follow direction & recognizes doing what she wants potentially puts her safety @ risk  Some preoccupied w/being SOB "for a good 10minutes" w/walking to lobby yesterday, similar walks  Presented to her that her body is out of condition, that even w/COPD, some ongoing exercise is warranted to build stamina (like using Incentive Spirometer helps builds lung capacity)  She has come up on own for scheduled medicine (except Singulair), ate 100% of meal, had substantial HS snack  Did take part in PM Group, though, insistent in walking to living room & back accompanied by a staff member or peer  Did not explain this concern  Used the [employer] achieving 1100-1250ml volume  Wearing now her QHS humidified nasal O2 @ 1L for bed

## 2020-01-24 NOTE — PLAN OF CARE
Problem: Alteration in Thoughts and Perception  Goal: Agree to be compliant with medication regime, as prescribed and report medication side effects  Description  Interventions:  - Offer appropriate PRN medication and supervise ingestion; conduct aims, as needed   Outcome: Progressing  Goal: Attend and participate in unit activities, including therapeutic, recreational, and educational groups  Description  Interventions:  - Provide therapeutic and educational activities daily, encourage attendance and participation, and document same in the medical record     CERTIFIED PEER SPECIALIST INTERVENTIONS:    Complete peer assessment with patient to assess their needs and identify their goals to complete while in the recovery program as well as once discharged into the community  Patient will complete WRAP Plan, Crisis Plan and 5 Life Domains  Patient will attend 50% of groups offered on the unit  Patient will complete a goal card weekly  Outcome: Progressing     Problem: Nutrition/Hydration-ADULT  Goal: Nutrient/Hydration intake appropriate for improving, restoring or maintaining nutritional needs  Description  Monitor and assess patient's nutrition/hydration status for malnutrition  Collaborate with interdisciplinary team and initiate plan and interventions as ordered  Monitor patient's weight and dietary intake as ordered or per policy  Utilize nutrition screening tool and intervene as necessary  Determine patient's food preferences and provide high-protein, high-caloric foods as appropriate       INTERVENTIONS:  - Monitor oral intake, urinary output, labs, and treatment plans  - Assess nutrition and hydration status and recommend course of action  - Evaluate amount of meals eaten  - Assist patient with eating if necessary   - Allow adequate time for meals  - Recommend/ encourage appropriate diets, oral nutritional supplements, and vitamin/mineral supplements  - Order, calculate, and assess calorie counts as needed  - Recommend, monitor, and adjust tube feedings and TPN/PPN based on assessed needs  - Assess need for intravenous fluids  - Provide specific nutrition/hydration education as appropriate  - Include patient/family/caregiver in decisions related to nutrition  Outcome: Progressing     Problem: Alteration in Thoughts and Perception  Goal: Complete daily ADLs, including personal hygiene independently, as able  Description  Interventions:  - Observe, teach, and assist patient with ADLS  - Monitor and promote a balance of rest/activity, with adequate nutrition and elimination   Outcome: Not Progressing     Problem: Depression  Goal: Refrain from isolation  Description  Interventions:  - Develop a trusting relationship   - Encourage socialization   Outcome: Not Progressing     [address] has been isolative to room & bed in free time  Is coming out for scheduled activities  Punctual for supper medicine, ate 100% of her meal plus an Ensure  Did not address any hygiene issues this shift  Is pleasant w/staff, but, minimally interacts w/peers  Did take part in PM Group  Used the [employer] but, did not achieve her usual volume, rather, only 750-900ml as was still feeling a bit SOB from her walk back to room from living room

## 2020-01-24 NOTE — PROGRESS NOTES
01/24/20 1100   Team Meeting   Meeting Type Daily Rounds   Team Members Present   Team Members Present Physician;Nurse;; Other (Discipline and Name)   Physician Team Member Dr Teresa Akins, RN   Care Management Team Member Brenda Parks   Other (Discipline and Name) Irma Lazo LCSW     Patient attended [facility] and 3-11 shift groups  No behavioral changes  Slept

## 2020-01-24 NOTE — ASSESSMENT & PLAN NOTE
Psychiatry Progress Note  Patient is attending [facility] groups did not take a shower as she said she was going to  She is still somewhat disheveled poorly groomed and needs frequent reminders to attend to taking showers at least twice a week  He has not voiced any overt delusions but questions some staff about this inhalant and the oxygen concentrator  She still has some psychosomatic symptoms about swallowing and breathing difficulties but has not been observed to have any obvious breathing difficulties over the last 24 hours  He is eating well sleeping well and compliant with medications most of the time with no side effects reported    She still continues to present with stilted speech and blood work has been acceptable for clozapine   Current medications:    Current Facility-Administered Medications:     acetaminophen (TYLENOL) tablet 650 mg, 650 mg, Oral, Q6H PRN, Prakash Brewster MD    albuterol (PROVENTIL HFA,VENTOLIN HFA) inhaler 2 puff, 2 puff, Inhalation, Q4H PRN, Prakash Brewster MD, 2 puff at 10/11/19 0424    aluminum-magnesium hydroxide-simethicone (MYLANTA) 200-200-20 mg/5 mL oral suspension 15 mL, 15 mL, Oral, Q4H PRN, Prakash Brewster MD    ammonium lactate (LAC-HYDRIN) 12 % lotion 1 application, 1 application, Topical, BID PRN, Prakash Brewster MD    benzonatate (TESSALON PERLES) capsule 100 mg, 100 mg, Oral, TID PRN, Prakash Brewster MD    benztropine (COGENTIN) injection 1 mg, 1 mg, Intramuscular, Q8H PRN, Prakash Brewster MD    carbamide peroxide (DEBROX) 6 5 % otic solution 5 drop, 5 drop, Left Ear, BID, Rona Phillips MD, 5 drop at 01/19/20 0839    cloZAPine (CLOZARIL) tablet 25 mg, 25 mg, Oral, BID, Prakash Brewster MD, 25 mg at 01/23/20 2128    cloZAPine (CLOZARIL) tablet 50 mg, 50 mg, Oral, BID, Prakash Brewster MD, 50 mg at 01/23/20 2128    EPINEPHrine PF (ADRENALIN) 1 mg/mL injection 0 15 mg, 0 15 mg, Intramuscular, Once PRN, Prakash Brewster MD    fluticasone-vilanterol (BREO ELLIPTA) 200-25 MCG/INH inhaler 1 puff, 1 puff, Inhalation, Daily, Deedee Muir MD, 1 puff at 01/23/20 0853    ketotifen (ZADITOR) 0 025 % ophthalmic solution 1 drop, 1 drop, Right Eye, BID PRN, Deedee Muir MD    levothyroxine tablet 125 mcg, 125 mcg, Oral, Early Morning, Deedee Muir MD, 125 mcg at 01/24/20 0601    magnesium hydroxide (MILK OF MAGNESIA) 400 mg/5 mL oral suspension 30 mL, 30 mL, Oral, Daily PRN, Deedee Muir MD    montelukast (SINGULAIR) tablet 10 mg, 10 mg, Oral, HS, Deedee Muir MD, 10 mg at 01/16/20 2106    OLANZapine (ZyPREXA) IM injection 5 mg, 5 mg, Intramuscular, Q8H PRN, Deedee Muir MD    OLANZapine (ZyPREXA) tablet 5 mg, 5 mg, Oral, Q8H PRN, Deedee Muir MD    ondansetron (ZOFRAN-ODT) dispersible tablet 4 mg, 4 mg, Oral, Q6H PRN, Deedee Muir MD, 4 mg at 12/09/19 1757    pantoprazole (PROTONIX) EC tablet 40 mg, 40 mg, Oral, Early Morning, Deedee Muir MD, 40 mg at 01/24/20 0601    polyethylene glycol (MIRALAX) packet 17 g, 17 g, Oral, Daily PRN, Deedee Muir MD    polyvinyl alcohol (LIQUIFILM TEARS) 1 4 % ophthalmic solution 1 drop, 1 drop, Both Eyes, Q3H PRN, Deedee Muir MD    sertraline (ZOLOFT) tablet 150 mg, 150 mg, Oral, Daily, Deedee Muir MD, 150 mg at 01/23/20 0855    sucralfate (CARAFATE) oral suspension 1,000 mg, 1,000 mg, Oral, BID, Cat Torres MD, 1,000 mg at 01/24/20 0601    theophylline (JEF-24) 24 hr capsule 200 mg, 200 mg, Oral, Daily, Deedee Muir MD, 200 mg at 01/23/20 0855    tiotropium [facility]) capsule for inhaler 18 mcg, 18 mcg, Inhalation, Daily, Deedee Muir MD, 18 mcg at 01/23/20 0853    traZODone (DESYREL) tablet 25 mg, 25 mg, Oral, HS PRN, Deedee Muir MD  Justification if on more than two antipsychotics:  Only on his clozapine  Side effects if any:  None    Risks , benefits, side effects and precautions of medications discussed with patient and reminded patient to let us know any problems with medications     Objective:     Vital Signs:  Vitals:    01/23/20 0732 01/23/20 1615 01/23/20 1900 01/24/20 0625   BP: 110/59 99/62 (!) 88/58    BP Location:  Right arm Left arm    Pulse: 76 90 100    Resp:  18 14    Temp:  (!) 97 4 °F (36 3 °C) 98 4 °F (36 9 °C)    TempSrc:  Temporal Temporal    SpO2:  95% 93% 95%   Weight:       Height:         Appearance:  Poorly groomed somewhat disheveled having taken a shower in few days but with good eye contact sitting on bed   Behavior:  Somewhat evasive guarded preoccupied but generally friendly and pleasant and polite   Speech:  Covering relevant logical but somewhat loud at times   Mood:  Anxious and dysthymic   Affect:  Elated entitled and irritated at times   Thought Process:  Coherent relevant logical but with stilted speech as if in a court of law   Thought Content:  Lionel appears to present with some suspiciousness even though she denies any overt delusional believes about micro chip or implanted under the lipoma on her arm  At times questions the staff if the tampering with her inhaler and or the oxygen concentrator  Still with some psychosomatic symptoms  No current suicidal homicidal thoughts and under plans reported   Perceptual Disturbances: None reported   Risk Potential: Tendency to not care for herself based on history   Sensorium:  person, place, time/date, situation, day of week, month of year and time today   Cognition:  grossly intact with no deficits in recent or remote memory or immediate recall elicited   Consciousness:  Fully alert and awake   Attention: Intact concentration and attention span noted   Intellect: Considered to be at least of average intelligence   Insight:  limited but improving slowly   Judgment: Improving steadily      Motor Activity: no abnormal movements         Recent Labs:  Results Reviewed     None          I/O Past 24 hours:  No intake/output data recorded  No intake/output data recorded          Assessment / Plan:     Schizoaffective disorder, bipolar type [facility])      Reason for continued inpatient care:  Because of underlying paranoia and inability to care for herself on her own and multiple somatic complaints  Acceptance by patient:  Accepting  Ericgordo Cevallos in recovery:  About living in same personal care home at the [place] once she feels better  Understanding of medications :  Has some understanding  Involved in reintegration process: On off unit privileges now  Trusting in relatoinship with psychiatrist:  Trusting    Recommended Treatment:    Medication changes:  1) none today  Non-pharmacological treatments  1) continue with  individual therapy group therapy, milieu therapy and occupational therapy and milieu therapy involving multidisciplinary team approach with psychotherapist, case management, nursing, peer support services, art therapist etc using recovery principles and psycho-education about accepting illness and need for treatment   2) encourage to cooperate with behavior plan  3) encourage to attend to ADLs like taking showers and wearing clean clothes   4) Encourage to attend groups   5) see how she does on off unit privileges and see how she does with staff off grounds again and expectations discussed with her and she did verbalize an understanding,counseled not to smoke while on passes because of COPD, to consider pass with sister in 2 weeks now  Safety  1) Safety/communication plan established targeting dynamic risk factors above  Discharge Plan back to the [facility] with act services and withdraw the [facility]AC[facility] referral due to improvement made so far  Counseling / Coordination of Care    Total floor / unit time spent today 15 minutes  Greater than 50% of total time was spent with the patient and / or family counseling and / or coordination of care  A description of the counseling / coordination of care       Patient's Rights, confidentiality and exceptions to confidentiality, use of automated medical record, Jeb Patrick staff access to medical record, and consent to treatment reviewed      Isidoro Gonzalez MD

## 2020-01-24 NOTE — PROGRESS NOTES
Progress Note - Marguerite Angel 1955, 60 y o  female MRN: [id number]    Unit/Bed#: [facility] 110-02 Encounter: [id number]    Primary Care Provider: Stacy King PA-C   Date and time admitted to hospital: 7/23/2019  5:30 PM        * Schizoaffective disorder, bipolar type [facility])  Assessment & Plan  Psychiatry Progress Note  Patient is attending [facility] groups did not take a shower as she said she was going to  She is still somewhat disheveled poorly groomed and needs frequent reminders to attend to taking showers at least twice a week  He has not voiced any overt delusions but questions some staff about this inhalant and the oxygen concentrator  She still has some psychosomatic symptoms about swallowing and breathing difficulties but has not been observed to have any obvious breathing difficulties over the last 24 hours  He is eating well sleeping well and compliant with medications most of the time with no side effects reported    She still continues to present with stilted speech and blood work has been acceptable for clozapine   Current medications:    Current Facility-Administered Medications:     acetaminophen (TYLENOL) tablet 650 mg, 650 mg, Oral, Q6H PRN, Jaz Beasley MD    albuterol (PROVENTIL HFA,VENTOLIN HFA) inhaler 2 puff, 2 puff, Inhalation, Q4H PRN, Jaz Beasley MD, 2 puff at 10/11/19 0424    aluminum-magnesium hydroxide-simethicone (MYLANTA) 200-200-20 mg/5 mL oral suspension 15 mL, 15 mL, Oral, Q4H PRN, Jaz Beasley MD    ammonium lactate (LAC-HYDRIN) 12 % lotion 1 application, 1 application, Topical, BID PRN, Jaz Beasley MD    benzonatate (TESSALON PERLES) capsule 100 mg, 100 mg, Oral, TID PRN, Jaz Beasley MD    benztropine (COGENTIN) injection 1 mg, 1 mg, Intramuscular, Q8H PRN, Jaz Beasley MD    carbamide peroxide (DEBROX) 6 5 % otic solution 5 drop, 5 drop, Left Ear, BID, Rona Doran MD, 5 drop at 01/19/20 0839    cloZAPine (CLOZARIL) tablet 25 mg, 25 mg, Oral, BID, Rayshawn Alexa Bryson Gallegos MD, 25 mg at 01/23/20 2128    cloZAPine (CLOZARIL) tablet 50 mg, 50 mg, Oral, BID, Alirio Frazier MD, 50 mg at 01/23/20 2128    EPINEPHrine PF (ADRENALIN) 1 mg/mL injection 0 15 mg, 0 15 mg, Intramuscular, Once PRN, Alirio Frazier MD    fluticasone-vilanterol (BREO ELLIPTA) 200-25 MCG/INH inhaler 1 puff, 1 puff, Inhalation, Daily, Alirio Frazier MD, 1 puff at 01/23/20 0853    ketotifen (ZADITOR) 0 025 % ophthalmic solution 1 drop, 1 drop, Right Eye, BID PRN, Alirio Frazier MD    levothyroxine tablet 125 mcg, 125 mcg, Oral, Early Morning, Alirio Frazier MD, 125 mcg at 01/24/20 0601    magnesium hydroxide (MILK OF MAGNESIA) 400 mg/5 mL oral suspension 30 mL, 30 mL, Oral, Daily PRN, Alirio Frazier MD    montelukast (SINGULAIR) tablet 10 mg, 10 mg, Oral, HS, Alirio Frazier MD, 10 mg at 01/16/20 2106    OLANZapine (ZyPREXA) IM injection 5 mg, 5 mg, Intramuscular, Q8H PRN, Alirio Frazier MD    OLANZapine (ZyPREXA) tablet 5 mg, 5 mg, Oral, Q8H PRN, Alirio Frazier MD    ondansetron (ZOFRAN-ODT) dispersible tablet 4 mg, 4 mg, Oral, Q6H PRN, Alirio Frazier MD, 4 mg at 12/09/19 1757    pantoprazole (PROTONIX) EC tablet 40 mg, 40 mg, Oral, Early Morning, Alirio Frazier MD, 40 mg at 01/24/20 0601    polyethylene glycol (MIRALAX) packet 17 g, 17 g, Oral, Daily PRN, Alirio Frazier MD    polyvinyl alcohol (LIQUIFILM TEARS) 1 4 % ophthalmic solution 1 drop, 1 drop, Both Eyes, Q3H PRN, Alirio Frazier MD    sertraline (ZOLOFT) tablet 150 mg, 150 mg, Oral, Daily, Alirio Frazier MD, 150 mg at 01/23/20 0855    sucralfate (CARAFATE) oral suspension 1,000 mg, 1,000 mg, Oral, BID, Cat Torres MD, 1,000 mg at 01/24/20 0601    theophylline (JEF-24) 24 hr capsule 200 mg, 200 mg, Oral, Daily, Alirio Frazier MD, 200 mg at 01/23/20 0855    tiotropium [facility]) capsule for inhaler 18 mcg, 18 mcg, Inhalation, Daily, Alirio Frazier MD, 18 mcg at 01/23/20 0853    traZODone (DESYREL) tablet 25 mg, 25 mg, Oral, HS PRN, Alirio Frazier MD  Justification if on more than two antipsychotics:  Only on his clozapine  Side effects if any:  None    Risks , benefits, side effects and precautions of medications discussed with patient and reminded patient to let us know any problems with medications     Objective:     Vital Signs:  Vitals:    01/23/20 0732 01/23/20 1615 01/23/20 1900 01/24/20 0625   BP: 110/59 99/62 (!) 88/58    BP Location:  Right arm Left arm    Pulse: 76 90 100    Resp:  18 14    Temp:  (!) 97 4 °F (36 3 °C) 98 4 °F (36 9 °C)    TempSrc:  Temporal Temporal    SpO2:  95% 93% 95%   Weight:       Height:         Appearance:  Poorly groomed somewhat disheveled having taken a shower in few days but with good eye contact sitting on bed   Behavior:  Somewhat evasive guarded preoccupied but generally friendly and pleasant and polite   Speech:  Covering relevant logical but somewhat loud at times   Mood:  Anxious and dysthymic   Affect:  Elated entitled and irritated at times   Thought Process:  Coherent relevant logical but with stilted speech as if in a court of law   Thought Content:  Lionel appears to present with some suspiciousness even though she denies any overt delusional believes about micro chip or implanted under the lipoma on her arm  At times questions the staff if the tampering with her inhaler and or the oxygen concentrator  Still with some psychosomatic symptoms    No current suicidal homicidal thoughts and under plans reported   Perceptual Disturbances: None reported   Risk Potential: Tendency to not care for herself based on history   Sensorium:  person, place, time/date, situation, day of week, month of year and time today   Cognition:  grossly intact with no deficits in recent or remote memory or immediate recall elicited   Consciousness:  Fully alert and awake   Attention: Intact concentration and attention span noted   Intellect: Considered to be at least of average intelligence   Insight:  limited but improving slowly   Judgment: Improving steadily      Motor Activity: no abnormal movements         Recent Labs:  Results Reviewed     None          I/O Past 24 hours:  No intake/output data recorded  No intake/output data recorded  Assessment / Plan:     Schizoaffective disorder, bipolar type ([address] )      Reason for continued inpatient care:  Because of underlying paranoia and inability to care for herself on her own and multiple somatic complaints  Acceptance by patient:  Accepting  Juan Gonzalez in recovery:  About living in same personal care home at the [place] once she feels better  Understanding of medications :  Has some understanding  Involved in reintegration process: On off unit privileges now  Trusting in relatoinship with psychiatrist:  Trusting    Recommended Treatment:    Medication changes:  1) none today  Non-pharmacological treatments  1) continue with  individual therapy group therapy, milieu therapy and occupational therapy and milieu therapy involving multidisciplinary team approach with psychotherapist, case management, nursing, peer support services, art therapist etc using recovery principles and psycho-education about accepting illness and need for treatment   2) encourage to cooperate with behavior plan  3) encourage to attend to ADLs like taking showers and wearing clean clothes   4) Encourage to attend groups   5) see how she does on off unit privileges and see how she does with staff off grounds again and expectations discussed with her and she did verbalize an understanding,counseled not to smoke while on passes because of COPD, to consider pass with sister in 2 weeks now  Safety  1) Safety/communication plan established targeting dynamic risk factors above  Discharge Plan back to the [facility] with act services and withdraw the [facility]IDENT[facility] TREATMENT FACILITY referral due to improvement made so far  Counseling / Coordination of Care    Total floor / unit time spent today 15 minutes   Greater than 50% of total time was spent with the patient and / or family counseling and / or coordination of care  A description of the counseling / coordination of care  Patient's Rights, confidentiality and exceptions to confidentiality, use of automated medical record, [facility] Patrick staff access to medical record, and consent to treatment reviewed      Shi Pham MD

## 2020-01-24 NOTE — PLAN OF CARE
Problem: Alteration in Thoughts and Perception  Goal: Verbalize thoughts and feelings  Description  Interventions:  - Promote a nonjudgmental and trusting relationship with the patient through active listening and therapeutic communication  - Assess patient's level of functioning, behavior and potential for risk  - Engage patient in 1 on 1 interactions for a minimum of 15 minutes each session  - Encourage patient to express fears, feelings, frustrations, and discuss symptoms    - [place] patient to reality, help patient recognize reality-based thinking   - Administer medications as ordered and assess for potential side effects  - Provide the patient education related to the signs and symptoms of the illness and desired effects of prescribed medications  Outcome: Progressing  Goal: Attend and participate in unit activities, including therapeutic, recreational, and educational groups  Description  Interventions:  - Provide therapeutic and educational activities daily, encourage attendance and participation, and document same in the medical record     CERTIFIED PEER SPECIALIST INTERVENTIONS:    Complete peer assessment with patient to assess their needs and identify their goals to complete while in the recovery program as well as once discharged into the community  Patient will complete WRAP Plan, Crisis Plan and 5 Life Domains  Patient will attend 50% of groups offered on the unit  Patient will complete a goal card weekly  Outcome: Progressing     Problem: Ineffective Coping  Goal: Participates in unit activities  Description  Interventions:  - Provide therapeutic environment   - Provide required programming   - Redirect inappropriate behaviors   Outcome: Progressing  Patient has been able to obtain 72% group attendance for the week of 1/10/2020  Patient participates in every IMR group she attends and is always on topic   Patient is comfortable in groups enough to share personal details of her own childhood, family issues and her own personal recovery  Patient has not been focused on her physical/health issues for a very long time and seems to be getting more out of group than she had in the past  Patient cooperative, polite and humorous at times  Patient has not shown interest in completing a WRAP Plan, Life Domains or BH Relapse Prevention plan at this time  Writer will continue to support and encourage Patients recovery efforts

## 2020-01-24 NOTE — PROGRESS NOTES
2145 Danie Steiner had an HS snack, came up for all HS medicine except the Singulair  Wearing now her QHS humidified nasal O2 @ 1L for bed

## 2020-01-24 NOTE — PLAN OF CARE
Problem: Alteration in Thoughts and Perception  Goal: Agree to be compliant with medication regime, as prescribed and report medication side effects  Description  Interventions:  - Offer appropriate PRN medication and supervise ingestion; conduct aims, as needed   Outcome: Progressing  Goal: Attend and participate in unit activities, including therapeutic, recreational, and educational groups  Description  Interventions:  - Provide therapeutic and educational activities daily, encourage attendance and participation, and document same in the medical record     CERTIFIED PEER SPECIALIST INTERVENTIONS:    Complete peer assessment with patient to assess their needs and identify their goals to complete while in the recovery program as well as once discharged into the community  Patient will complete WRAP Plan, Crisis Plan and 5 Life Domains  Patient will attend 50% of groups offered on the unit  Patient will complete a goal card weekly  Outcome: Progressing  Goal: Complete daily ADLs, including personal hygiene independently, as able  Description  Interventions:  - Observe, teach, and assist patient with ADLS  - Monitor and promote a balance of rest/activity, with adequate nutrition and elimination   Outcome: Progressing     Problem: Ineffective Coping  Goal: Participates in unit activities  Description  Interventions:  - Provide therapeutic environment   - Provide required programming   - Redirect inappropriate behaviors   Outcome: Kenny Aguilar has been intermittently visible on the unit in between meals and select groups  Ate oatmeal and yogurt for breakfast and 25 % of her lunch  Behaviors controlled and appropriate, no somatic complaints voiced or signs of distress reported  Pleasant on approach but interactions remain minimal  Currently in her room resting, will continue to monitor for changes in behavior

## 2020-01-25 NOTE — PLAN OF CARE
Problem: Alteration in Thoughts and Perception  Goal: Agree to be compliant with medication regime, as prescribed and report medication side effects  Description  Interventions:  - Offer appropriate PRN medication and supervise ingestion; conduct aims, as needed   Outcome: Progressing  Goal: Attend and participate in unit activities, including therapeutic, recreational, and educational groups  Description  Interventions:  - Provide therapeutic and educational activities daily, encourage attendance and participation, and document same in the medical record     CERTIFIED PEER SPECIALIST INTERVENTIONS:    Complete peer assessment with patient to assess their needs and identify their goals to complete while in the recovery program as well as once discharged into the community  Patient will complete WRAP Plan, Crisis Plan and 5 Life Domains  Patient will attend 50% of groups offered on the unit  Patient will complete a goal card weekly  Outcome: Progressing     Problem: Alteration in Thoughts and Perception  Goal: Complete daily ADLs, including personal hygiene independently, as able  Description  Interventions:  - Observe, teach, and assist patient with ADLS  - Monitor and promote a balance of rest/activity, with adequate nutrition and elimination   Outcome: Not Progressing     Problem: Depression  Goal: Refrain from isolation  Description  Interventions:  - Develop a trusting relationship   - Encourage socialization   Outcome: Not Progressing  Goal: Refrain from self-neglect  Outcome: Not Progressing     2015 Staci Baum has been mostly isolative to her room & bed  But, has come out for meal/ate 100%, & came up on own for supper medicine  She is pleasant w/staff, minimal peer socialization  Has neglected hygiene, has not made any effort to sort or fold the mountain of clothing piled in room  Has put in an appearance as a spectator @ [employer]

## 2020-01-25 NOTE — PROGRESS NOTES
2145 Rena Miracle did use the [employer] reaching volumes of 1100-1250ml  She did have a substantial snack of cookies & 12oz milk  Came up on own for HS medicine w/exception of Singulair  Wearing now her QHS humidified nasal O2 @ 1L for bed

## 2020-01-25 NOTE — PROGRESS NOTES
Psychiatry Progress Note    Subjective: Interval History     The patient is visible on the unit  She attended nutrition group  Staff states she continues to be attention seeking at times  She did recently go on an hour pass and convinced them to take her to [facility] instead of [facility]  Patient has been medication and meal compliant  She states she is eating better  Patient states she is sleeping well at night  She believes that Zoloft makes her more tired but not groggy  Patient states she will shower tonight  She offers no other concerns today      Behavior over the last 24 hours:  unchanged  Sleep: normal  Appetite: normal  Medication side effects: No  ROS: no complaints    Current medications:    Current Facility-Administered Medications:     acetaminophen (TYLENOL) tablet 325 mg, 325 mg, Oral, Q6H PRN, Dalton Garner MD    acetaminophen (TYLENOL) tablet 650 mg, 650 mg, Oral, Q6H PRN, Dalton Garner MD    acetaminophen (TYLENOL) tablet 650 mg, 650 mg, Oral, Q8H PRN, Dalton Garner MD    albuterol (PROVENTIL HFA,VENTOLIN HFA) inhaler 2 puff, 2 puff, Inhalation, Q4H PRN, Dalton Garner MD, 2 puff at 10/11/19 0424    aluminum-magnesium hydroxide-simethicone (MYLANTA) 200-200-20 mg/5 mL oral suspension 15 mL, 15 mL, Oral, Q4H PRN, Dalton Garner MD    ammonium lactate (LAC-HYDRIN) 12 % lotion 1 application, 1 application, Topical, BID PRN, Dalton Garner MD    benzonatate (TESSALON PERLES) capsule 100 mg, 100 mg, Oral, TID PRN, Dalton Garner MD    benztropine (COGENTIN) injection 1 mg, 1 mg, Intramuscular, Q8H PRN, Dalton Garner MD    carbamide peroxide (DEBROX) 6 5 % otic solution 5 drop, 5 drop, Left Ear, BID, Rona Correia MD, 5 drop at 01/24/20 0824    cloZAPine (CLOZARIL) tablet 25 mg, 25 mg, Oral, BID, Dalton Garner MD, 25 mg at 01/24/20 2042    cloZAPine (CLOZARIL) tablet 50 mg, 50 mg, Oral, BID, Dalton Garner MD, 50 mg at 01/24/20 2042    EPINEPHrine PF (ADRENALIN) 1 mg/mL injection 0 15 mg, 0 15 mg, Intramuscular, Once PRN, Allie Guzman MD    fluticasone-vilanterol (BREO ELLIPTA) 200-25 MCG/INH inhaler 1 puff, 1 puff, Inhalation, Daily, Allie Guzman MD, 1 puff at 01/25/20 0912    ketotifen (ZADITOR) 0 025 % ophthalmic solution 1 drop, 1 drop, Right Eye, BID PRN, Allie Guzman MD    levothyroxine tablet 125 mcg, 125 mcg, Oral, Early Morning, Allie Guzman MD, 125 mcg at 01/25/20 0559    magnesium hydroxide (MILK OF MAGNESIA) 400 mg/5 mL oral suspension 30 mL, 30 mL, Oral, Daily PRN, Allie Guzman MD    montelukast (SINGULAIR) tablet 10 mg, 10 mg, Oral, HS, Allie Guzman MD, 10 mg at 01/16/20 2106    OLANZapine (ZyPREXA) IM injection 5 mg, 5 mg, Intramuscular, Q8H PRN, Allie Guzman MD    OLANZapine (ZyPREXA) tablet 5 mg, 5 mg, Oral, Q8H PRN, Allie Guzman MD    ondansetron (ZOFRAN-ODT) dispersible tablet 4 mg, 4 mg, Oral, Q6H PRN, Allie Guzman MD, 4 mg at 12/09/19 1757    pantoprazole (PROTONIX) EC tablet 40 mg, 40 mg, Oral, Early Morning, Allie Guzman MD, 40 mg at 01/25/20 0559    polyethylene glycol (MIRALAX) packet 17 g, 17 g, Oral, Daily PRN, Allie Guzman MD    polyvinyl alcohol (LIQUIFILM TEARS) 1 4 % ophthalmic solution 1 drop, 1 drop, Both Eyes, Q3H PRN, Allie Guzman MD    sertraline (ZOLOFT) tablet 150 mg, 150 mg, Oral, Daily, Allie Guzman MD, 150 mg at 01/25/20 4507    sucralfate (CARAFATE) oral suspension 1,000 mg, 1,000 mg, Oral, BID, Cat Torres MD, 1,000 mg at 01/25/20 0600    theophylline (JEF-24) 24 hr capsule 200 mg, 200 mg, Oral, Daily, Allie Guzman MD, 200 mg at 01/25/20 0910    tiotropium (SPIRIVA) capsule for inhaler 18 mcg, 18 mcg, Inhalation, Daily, Allie Guzman MD, 18 mcg at 01/25/20 0911    traZODone (DESYREL) tablet 25 mg, 25 mg, Oral, HS PRN, Allie Guzman MD    Current Problem List:    Patient Active Problem List   Diagnosis    [facility]    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    Tobacco use disorder, continuous    [facility]    Left hip pain    Lactic acidosis  Hypokalemia    Hypomagnesemia    Compression fracture of L4 lumbar vertebra    Thoracic compression fracture (HCC)    Ventral hernia    Parapneumonic effusion    Acute on chronic respiratory failure with hypoxia (HCC)    Chronic respiratory failure (HCC)    Hypophosphatemia    Elevated MCV    Schizoaffective disorder, bipolar type (HCC)    Acquired hypothyroidism    Gastroesophageal reflux disease without esophagitis    Abnormal CT of the chest    Excessive cerumen in left ear canal    Lipoma of right upper extremity    Polydipsia    Localized swelling of both lower legs    Noncompliant with deep vein thrombosis (DVT) prophylaxis    Allergic conjunctivitis of right eye    At risk for aspiration    Ear ache       Problem list reviewed 01/25/20     Objective:     Vital Signs:  Vitals:    01/24/20 1615 01/24/20 2010 01/25/20 0612 01/25/20 0700   BP: 90/62 100/58  121/68   BP Location: Right arm Left arm  Right arm   Pulse: 71 86  87   Resp: 16 18  18   Temp: 98 9 °F (37 2 °C) 99 °F (37 2 °C)  (!) 97 °F (36 1 °C)   TempSrc: Temporal Temporal     SpO2: 92% 92% 94% 95%   Weight:       Height:             Appearance:  age appropriate, casually dressed and disheveled   Behavior:  evasive and guarded   Speech:  normal volume   Mood:  anxious   Affect:  constricted   Thought Process:  normal and logical   Thought Content:  delusions  persecutory   Perceptual Disturbances: None   Risk Potential: none   Sensorium:  person, place, situation and time   Cognition:  intact   Consciousness:  alert and awake    Attention: attention span and concentration were age appropriate   Intellect: average   Insight:  limited   Judgment: limited      Motor Activity: no abnormal movements       I/O Past 24 hours:  No intake/output data recorded  No intake/output data recorded          Labs:  Reviewed 01/25/20    Progress Toward Goals:  Unchanged    Assessment / Plan:     [facility]ffective [facility], [facility] ([facility]    Recommended Treatment:      Medication changes:  1) continue current medication regimen    Non-pharmacological treatments  1) Continue with group therapy, milieu therapy and occupational therapy  Safety  1) Safety/communication plan established targeting dynamic risk factors above  2) Risks, benefits, and possible side effects of medications explained to patient and patient verbalizes understanding  Counseling / Coordination of Care    Total floor / unit time spent today 20 minutes  Greater than 50% of total time was spent with the patient and / or family counseling and / or coordination of care  A description of the counseling / coordination of care  Patient's Rights, confidentiality and exceptions to confidentiality, use of automated medical record, [facility] staff access to medical record, and consent to treatment reviewed      Korin Solano PA-C

## 2020-01-26 NOTE — PLAN OF CARE
Problem: Alteration in Thoughts and Perception  Goal: Verbalize thoughts and feelings  Description  Interventions:  - Promote a nonjudgmental and trusting relationship with the patient through active listening and therapeutic communication  - Assess patient's level of functioning, behavior and potential for risk  - Engage patient in 1 on 1 interactions for a minimum of 15 minutes each session  - Encourage patient to express fears, feelings, frustrations, and discuss symptoms    - [place] patient to reality, help patient recognize reality-based thinking   - Administer medications as ordered and assess for potential side effects  - Provide the patient education related to the signs and symptoms of the illness and desired effects of prescribed medications  Outcome: Progressing  Goal: Agree to be compliant with medication regime, as prescribed and report medication side effects  Description  Interventions:  - Offer appropriate PRN medication and supervise ingestion; conduct aims, as needed   Outcome: Progressing     Problem: Ineffective Coping  Goal: Patient/Family verbalizes awareness of resources  Outcome: Progressing  Goal: Understands least restrictive measures  Description  Interventions:  - Utilize least restrictive behavior  Outcome: Progressing     Problem: Risk for Self Injury/Neglect  Goal: Treatment Goal: Remain safe during length of stay, learn and adopt new coping skills, and be free of self-injurious ideation, impulses and acts at the time of discharge  Outcome: Progressing  Goal: Verbalize thoughts and feelings  Description  Interventions:  - Assess and re-assess patient's lethality and potential for self-injury  - Engage patient in 1:1 interactions, daily, for a minimum of 15 minutes  - Encourage patient to express feelings, fears, frustrations, hopes  - Establish rapport/trust with patient   Outcome: Progressing  Goal: Refrain from harming self  Description  Interventions:  - Monitor patient closely, per order  - Develop a trusting relationship  - Supervise medication ingestion, monitor effects and side effects   Outcome: Progressing     Problem: Depression  Goal: Treatment Goal: Demonstrate behavioral control of depressive symptoms, verbalize feelings of improved mood/affect, and adopt new coping skills prior to discharge  Outcome: Progressing  Goal: Verbalize thoughts and feelings  Description  Interventions:  - Assess and re-assess patient's level of risk   - Engage patient in 1:1 interactions, daily, for a minimum of 15 minutes   - Encourage patient to express feelings, fears, frustrations, hopes   Outcome: Progressing     Problem: Anxiety  Goal: Anxiety is at manageable level  Description  Interventions:  - Assess and monitor patient's anxiety level  - Monitor for signs and symptoms of anxiety both physical and emotional (heart palpitations, chest pain, shortness of breath, headaches, nausea, feeling jumpy, restlessness, irritable, apprehensive)  - Collaborate with interdisciplinary team and initiate plan and interventions as ordered    - [place] patient to unit/surroundings  - Explain treatment plan  - Encourage participation in care  - Encourage verbalization of concerns/fears  - Identify coping mechanisms  - Assist in developing anxiety-reducing skills  - Administer/offer alternative therapies  - Limit or eliminate stimulants  Outcome: Progressing     Problem: Alteration in Orientation  Goal: Interact with staff daily  Description  Interventions:  - Assess and re-assess patient's level of orientation  - Engage patient in 1 on 1 interactions, daily, for a minimum of 15 minutes   - Establish rapport/trust with patient   Outcome: Progressing     Problem: PAIN - ADULT  Goal: Verbalizes/displays adequate comfort level or baseline comfort level  Description  Interventions:  - Encourage patient to monitor pain and request assistance  - Assess pain using appropriate pain scale  - Administer analgesics based on type and severity of pain and evaluate response  - Implement non-pharmacological measures as appropriate and evaluate response  - Consider cultural and social influences on pain and pain management  - Notify physician/advanced practitioner if interventions unsuccessful or patient reports new pain  Outcome: Progressing     Problem: SAFETY ADULT  Goal: Patient will remain free of falls  Description  INTERVENTIONS:  - Assess patient frequently for physical needs  -  Identify cognitive and physical deficits and behaviors that affect risk of falls  -  [place] fall precautions as indicated by assessment   - Educate patient/family on patient safety including physical limitations  - Instruct patient to call for assistance with activity based on assessment  - Modify environment to reduce risk of injury  - Consider OT/PT consult to assist with strengthening/mobility  Outcome: Progressing     Problem: Alteration in Thoughts and Perception  Goal: Treatment Goal: Gain control of psychotic behaviors/thinking, reduce/eliminate presenting symptoms and demonstrate improved reality functioning upon discharge  Outcome: Not Progressing  Goal: Attend and participate in unit activities, including therapeutic, recreational, and educational groups  Description  Interventions:  - Provide therapeutic and educational activities daily, encourage attendance and participation, and document same in the medical record     CERTIFIED PEER SPECIALIST INTERVENTIONS:    Complete peer assessment with patient to assess their needs and identify their goals to complete while in the recovery program as well as once discharged into the community  Patient will complete WRAP Plan, Crisis Plan and [employer]  Patient will attend 50% of groups offered on the unit  Patient will complete a goal card weekly      Outcome: Not Progressing  Goal: Recognize dysfunctional thoughts, communicate reality-based thoughts at the time of discharge  Description  Interventions:  - Provide medication and psycho-education to assist patient in compliance and developing insight into his/her illness   Outcome: Not Progressing     Problem: Ineffective Coping  Goal: Demonstrates healthy coping skills  Outcome: Not Progressing  Goal: Participates in unit activities  Description  Interventions:  - Provide therapeutic environment   - Provide required programming   - Redirect inappropriate behaviors   Outcome: Not Progressing     Problem: Risk for Self Injury/Neglect  Goal: Attend and participate in unit activities, including therapeutic, recreational, and educational groups  Description  Interventions:  - Provide therapeutic and educational activities daily, encourage attendance and participation, and document same in the medical record  - Obtain collateral information, encourage visitation and family involvement in care   Outcome: Not Progressing  Goal: Recognize maladaptive responses and adopt new coping mechanisms  Outcome: Not Progressing     Problem: Depression  Goal: Refrain from isolation  Description  Interventions:  - Develop a trusting relationship   - Encourage socialization   Outcome: Not Progressing  Goal: Refrain from self-neglect  Outcome: Not Progressing     Problem: Nutrition/Hydration-ADULT  Goal: Nutrient/Hydration intake appropriate for improving, restoring or maintaining nutritional needs  Description  Monitor and assess patient's nutrition/hydration status for malnutrition  Collaborate with interdisciplinary team and initiate plan and interventions as ordered  Monitor patient's weight and dietary intake as ordered or per policy  Utilize nutrition screening tool and intervene as necessary  Determine patient's food preferences and provide high-protein, high-caloric foods as appropriate       INTERVENTIONS:  - Monitor oral intake, urinary output, labs, and treatment plans  - Assess nutrition and hydration status and recommend course of action  - Evaluate amount of meals eaten  - Assist patient with eating if necessary   - Allow adequate time for meals  - Recommend/ encourage appropriate diets, oral nutritional supplements, and vitamin/mineral supplements  - Order, calculate, and assess calorie counts as needed  - Recommend, monitor, and adjust tube feedings and TPN/PPN based on assessed needs  - Assess need for intravenous fluids  - Provide specific nutrition/hydration education as appropriate  - Include patient/family/caregiver in decisions related to nutrition  Outcome: Not Progressing   Mostly isolative to her bed, more so than yesterday  Came out of her room for breakfast and took her meds, then remained in her room for the remainder of the shift  Ate 50% of breakfast and did not come out of her room for lunch  Did not attend any groups  Requested PRN Mylanta for indigestion / heartburn was given and appeared to be affective as the patient voiced no further complaints  Patient was also encouraged to sit up in bed or in a chair to ease some of the discomfort from the heartburn with which she did not comply  Somatically paranoid, had voiced concerns about being allergic to the Mylanta, "what if I have a reaction, I could die"  Reassured the patient, also pointed out that she has taken Mylanta before without any issues and if she should somehow have a severe reaction, what better place than in a hospital with trained medical staff on hand  Patient responded, "yeah, you're right  I'm just worrying too much"  No further behaviors or issues noted  Continue to monitor

## 2020-01-26 NOTE — PROGRESS NOTES
Psychiatry Progress Note    Subjective: Interval History     The patient is lying in bed today  She did sleep well through the night  No behavioral issues noted per staff  Patient states she did shower last evening  She continues to report that Zoloft makes her tired but reports "It is not a bad tired " She has been medication compliant  Appetite is fair  She states she did go to journal and nutrition groups yesterday  She is denying any hallucinations or suicidal ideations      Behavior over the last 24 hours:  unchanged  Sleep: normal  Appetite: normal  Medication side effects: No  ROS: no complaints    Current medications:    Current Facility-Administered Medications:     acetaminophen (TYLENOL) tablet 325 mg, 325 mg, Oral, Q6H PRN, Teri Huggins MD    acetaminophen (TYLENOL) tablet 650 mg, 650 mg, Oral, Q6H PRN, Teri Huggins MD    acetaminophen (TYLENOL) tablet 650 mg, 650 mg, Oral, Q8H PRN, Teri Huggins MD    albuterol (PROVENTIL HFA,VENTOLIN HFA) inhaler 2 puff, 2 puff, Inhalation, Q4H PRN, Teri Huggins MD, 2 puff at 10/11/19 0424    aluminum-magnesium hydroxide-simethicone (MYLANTA) 200-200-20 mg/5 mL oral suspension 15 mL, 15 mL, Oral, Q4H PRN, Teri Huggins MD    ammonium lactate (LAC-HYDRIN) 12 % lotion 1 application, 1 application, Topical, BID PRN, Teri Huggins MD    benzonatate (TESSALON PERLES) capsule 100 mg, 100 mg, Oral, TID PRN, Teri Huggins MD    benztropine (COGENTIN) injection 1 mg, 1 mg, Intramuscular, Q8H PRN, Teri Huggins MD    carbamide peroxide (DEBROX) 6 5 % otic solution 5 drop, 5 drop, Left Ear, BID, Rona Guillen MD, 5 drop at 01/24/20 0710    cloZAPine (CLOZARIL) tablet 25 mg, 25 mg, Oral, BID, Teri Huggins MD, 25 mg at 01/25/20 2055    cloZAPine (CLOZARIL) tablet 50 mg, 50 mg, Oral, BID, Teri Huggins MD, 50 mg at 01/25/20 2055    EPINEPHrine PF (ADRENALIN) 1 mg/mL injection 0 15 mg, 0 15 mg, Intramuscular, Once PRN, Teri Huggins MD    fluticasone-vilanterol [facility] ELLIPTA) 200-25 MCG/INH inhaler 1 puff, 1 puff, Inhalation, Daily, Allie Guzman MD, 1 puff at 01/26/20 0915    ketotifen (ZADITOR) 0 025 % ophthalmic solution 1 drop, 1 drop, Right Eye, BID PRN, Allie Guzman MD    levothyroxine tablet 125 mcg, 125 mcg, Oral, Early Morning, Allie Guzman MD, 125 mcg at 01/26/20 0618    magnesium hydroxide (MILK OF MAGNESIA) 400 mg/5 mL oral suspension 30 mL, 30 mL, Oral, Daily PRN, Allie Guzman MD    montelukast (SINGULAIR) tablet 10 mg, 10 mg, Oral, HS, Allie Guzman MD, 10 mg at 01/16/20 2106    OLANZapine (ZyPREXA) IM injection 5 mg, 5 mg, Intramuscular, Q8H PRN, Allie Guzman MD    OLANZapine (ZyPREXA) tablet 5 mg, 5 mg, Oral, Q8H PRN, Allie Guzman MD    ondansetron (ZOFRAN-ODT) dispersible tablet 4 mg, 4 mg, Oral, Q6H PRN, Allie Guzman MD, 4 mg at 12/09/19 1757    pantoprazole (PROTONIX) EC tablet 40 mg, 40 mg, Oral, Early Morning, Allie Guzman MD, 40 mg at 01/26/20 0618    polyethylene glycol (MIRALAX) packet 17 g, 17 g, Oral, Daily PRN, Allie Guzman MD    polyvinyl alcohol (LIQUIFILM TEARS) 1 4 % ophthalmic solution 1 drop, 1 drop, Both Eyes, Q3H PRN, Allie Guzman MD    sertraline (ZOLOFT) tablet 150 mg, 150 mg, Oral, Daily, Allie Guzman MD, 150 mg at 01/26/20 0915    sucralfate (CARAFATE) oral suspension 1,000 mg, 1,000 mg, Oral, BID, Cat Torres MD, 1,000 mg at 01/26/20 9342    theophylline (JEF-24) 24 hr capsule 200 mg, 200 mg, Oral, Daily, Allie Guzman MD, 200 mg at 01/26/20 0915    tiotropium [facility]) capsule for inhaler 18 mcg, 18 mcg, Inhalation, Daily, Allie Guzman MD, 18 mcg at 01/26/20 0915    traZODone (DESYREL) tablet 25 mg, 25 mg, Oral, HS PRN, Allie Guzman MD    Current Problem List:    Patient Active Problem List   Diagnosis    [facility]    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    Tobacco use disorder, continuous    [facility]    Left hip pain    Lactic acidosis    Hypokalemia    Hypomagnesemia    Compression fracture of L4 lumbar vertebra    Thoracic compression fracture (HCC)    Ventral hernia    Parapneumonic effusion    Acute on chronic respiratory failure with hypoxia (HCC)    Chronic respiratory failure (HCC)    Hypophosphatemia    Elevated MCV    Schizoaffective disorder, bipolar type (HCC)    Acquired hypothyroidism    Gastroesophageal reflux disease without esophagitis    Abnormal CT of the chest    Excessive cerumen in left ear canal    Lipoma of right upper extremity    Polydipsia    Localized swelling of both lower legs    Noncompliant with deep vein thrombosis (DVT) prophylaxis    Allergic conjunctivitis of right eye    At risk for aspiration    Ear ache       Problem list reviewed 01/26/20     Objective:     Vital Signs:  Vitals:    01/25/20 1942 01/25/20 2140 01/26/20 0730 01/26/20 0732   BP: 111/55  109/68 101/61   BP Location: Left arm  Left arm Left arm   Pulse: 72  82 69   Resp: 16  16    Temp: 97 9 °F (36 6 °C)  97 5 °F (36 4 °C)    TempSrc: Temporal  Temporal    SpO2: 92% 90%     Weight:   66 7 kg (147 lb)    Height:             Appearance:  age appropriate, casually dressed and disheveled   Behavior:  normal   Speech:  normal volume   Mood:  anxious   Affect:  constricted   Thought Process:  normal and logical   Thought Content:  delusions  persecutory   Perceptual Disturbances: None   Risk Potential: none   Sensorium:  person, place, situation and time   Cognition:  intact   Consciousness:  alert and awake    Attention: attention span and concentration were age appropriate   Intellect: average   Insight:  limited   Judgment: limited      Motor Activity: no abnormal movements       I/O Past 24 hours:  No intake/output data recorded  No intake/output data recorded          Labs:  Reviewed 01/26/20    Progress Toward Goals:  Unchanged    Assessment / Plan:     [facility]ffective disorder[facility]    Recommended Treatment:      Medication changes:  1) continue current medication regimen    Non-pharmacological treatments  1) Continue with group therapy, milieu therapy and occupational therapy  Safety  1) Safety/communication plan established targeting dynamic risk factors above  2) Risks, benefits, and possible side effects of medications explained to patient and patient verbalizes understanding  Counseling / Coordination of Care    Total floor / unit time spent today 20 minutes  Greater than 50% of total time was spent with the patient and / or family counseling and / or coordination of care  A description of the counseling / coordination of care  Patient's Rights, confidentiality and exceptions to confidentiality, use of automated medical record, [facility] staff access to medical record, and consent to treatment reviewed      Vita Salter PA-C

## 2020-01-26 NOTE — PROGRESS NOTES
The patient slept through the night with no behaviors or issues noted  Med compliant  Continue to monitor

## 2020-01-27 NOTE — PLAN OF CARE
Problem: Alteration in Thoughts and Perception  Goal: Agree to be compliant with medication regime, as prescribed and report medication side effects  Description  Interventions:  - Offer appropriate PRN medication and supervise ingestion; conduct aims, as needed   Outcome: Progressing  Goal: Attend and participate in unit activities, including therapeutic, recreational, and educational groups  Description  Interventions:  - Provide therapeutic and educational activities daily, encourage attendance and participation, and document same in the medical record     CERTIFIED PEER SPECIALIST INTERVENTIONS:    Complete peer assessment with patient to assess their needs and identify their goals to complete while in the recovery program as well as once discharged into the community  Patient will complete WRAP Plan, Crisis Plan and 5 Life Domains  Patient will attend 50% of groups offered on the unit  Patient will complete a goal card weekly      Outcome: Progressing     Problem: Ineffective Coping  Goal: Participates in unit activities  Description  Interventions:  - Provide therapeutic environment   - Provide required programming   - Redirect inappropriate behaviors   Outcome: Progressing  Goal: Patient/Family verbalizes awareness of resources  Outcome: Progressing  Goal: Understands least restrictive measures  Description  Interventions:  - Utilize least restrictive behavior  Outcome: Progressing     Problem: Risk for Self Injury/Neglect  Goal: Treatment Goal: Remain safe during length of stay, learn and adopt new coping skills, and be free of self-injurious ideation, impulses and acts at the time of discharge  Outcome: Progressing  Goal: Refrain from harming self  Description  Interventions:  - Monitor patient closely, per order  - Develop a trusting relationship  - Supervise medication ingestion, monitor effects and side effects   Outcome: Progressing  Goal: Attend and participate in unit activities, including therapeutic, recreational, and educational groups  Description  Interventions:  - Provide therapeutic and educational activities daily, encourage attendance and participation, and document same in the medical record  - Obtain collateral information, encourage visitation and family involvement in care   Outcome: Progressing     Problem: Depression  Goal: Treatment Goal: Demonstrate behavioral control of depressive symptoms, verbalize feelings of improved mood/affect, and adopt new coping skills prior to discharge  Outcome: Progressing     Problem: Alteration in Orientation  Goal: Interact with staff daily  Description  Interventions:  - Assess and re-assess patient's level of orientation  - Engage patient in 1 on 1 interactions, daily, for a minimum of 15 minutes   - Establish rapport/trust with patient   Outcome: Progressing     Problem: PAIN - ADULT  Goal: Verbalizes/displays adequate comfort level or baseline comfort level  Description  Interventions:  - Encourage patient to monitor pain and request assistance  - Assess pain using appropriate pain scale  - Administer analgesics based on type and severity of pain and evaluate response  - Implement non-pharmacological measures as appropriate and evaluate response  - Consider cultural and social influences on pain and pain management  - Notify physician/advanced practitioner if interventions unsuccessful or patient reports new pain  Outcome: Progressing     Problem: SAFETY ADULT  Goal: Patient will remain free of falls  Description  INTERVENTIONS:  - Assess patient frequently for physical needs  -  Identify cognitive and physical deficits and behaviors that affect risk of falls    -  [place] fall precautions as indicated by assessment   - Educate patient/family on patient safety including physical limitations  - Instruct patient to call for assistance with activity based on assessment  - Modify environment to reduce risk of injury  - Consider OT/PT consult to assist with strengthening/mobility  Outcome: Progressing     Problem: Nutrition/Hydration-ADULT  Goal: Nutrient/Hydration intake appropriate for improving, restoring or maintaining nutritional needs  Description  Monitor and assess patient's nutrition/hydration status for malnutrition  Collaborate with interdisciplinary team and initiate plan and interventions as ordered  Monitor patient's weight and dietary intake as ordered or per policy  Utilize nutrition screening tool and intervene as necessary  Determine patient's food preferences and provide high-protein, high-caloric foods as appropriate  INTERVENTIONS:  - Monitor oral intake, urinary output, labs, and treatment plans  - Assess nutrition and hydration status and recommend course of action  - Evaluate amount of meals eaten  - Assist patient with eating if necessary   - Allow adequate time for meals  - Recommend/ encourage appropriate diets, oral nutritional supplements, and vitamin/mineral supplements  - Order, calculate, and assess calorie counts as needed  - Recommend, monitor, and adjust tube feedings and TPN/PPN based on assessed needs  - Assess need for intravenous fluids  - Provide specific nutrition/hydration education as appropriate  - Include patient/family/caregiver in decisions related to nutrition  Outcome: Not Progressing   Mostly isolative to her bed, out for meds and meals, attended [facility] group  Ate 75% of breakfast and 5% of lunch  No somatic complaints voiced, no issues noted  Continue to monitor

## 2020-01-27 NOTE — PROGRESS NOTES
Patient engaged in group however, found fault and excuses to why she does not complete a weekly goal card, WRAP Plan and gave excuses for staying in bed all day  01/27/20 1001   Activity/Group Checklist   Group   (IMR/Weekly Goal Cards)   Attendance Attended   Attendance Duration (min) 31-45   Interactions Disorganized interaction   Affect/Mood Wide   Goals Achieved Identified feelings; Able to listen to others; Able to engage in interactions; Able to manage/cope with feelings

## 2020-01-27 NOTE — PROGRESS NOTES
01/27/20 0900   Team Meeting   Meeting Type Daily Rounds   Team Members Present   Team Members Present Physician;Nurse;; Other (Discipline and Name)   Physician Team Member Dr Sudhir Sheffield Team Member Dmitriy Blas RN   Care Management Team Member Brenda Mccord   Other (Discipline and Name) Kelby Herrera, LCSW     Patient presented with no behavioral issues  Sister visited her on the unit  Showered on 1/25  Slept

## 2020-01-27 NOTE — PSYCH
Pushpa Morgan 60 y o  female MRN: [id number]  Unit/Bed#: [facility] 414-22 Encounter: [id number]  Psychiatry Progress Not  Subjective: Interval History   Patient did finally take a shower last Saturday after about a week and has been attending most of the groups and he was again reminded that she is supposed to take 2 showers a week not 1 and she was praised for attending more than 50% of the groups including IMR groups  She still becomes his attend in taking showers and keeps her dirty clothes pile up on the floor and needs lot of promptings by staff  She has been eating fairly and eating well  She is still accusatory off certain staff about tampering with her spirometer or her oxygen concentrator off and on  She denies any overt delusional believes about having an implant on her on underneath the lipoma but she continues to talk  in a stilted manner    Not aggressive self-abusive but hygiene and grooming are still poor  Behavior over the last 24 hours:   no changes  Sleep:  Normal  Appetite:  Normal  Medication side effects none  ROS:  No complaints except for slight tiredness but no grogginess and still with lack of motivation at times  Current medications:    Current Facility-Administered Medications:     acetaminophen (TYLENOL) tablet 325 mg, 325 mg, Oral, Q6H PRN, Carissa Willis MD    acetaminophen (TYLENOL) tablet 650 mg, 650 mg, Oral, Q6H PRN, Carissa Willis MD    acetaminophen (TYLENOL) tablet 650 mg, 650 mg, Oral, Q8H PRN, Carissa Willis MD    albuterol (PROVENTIL HFA,VENTOLIN HFA) inhaler 2 puff, 2 puff, Inhalation, Q4H PRN, Carissa Willis MD, 2 puff at 10/11/19 0424    aluminum-magnesium hydroxide-simethicone (MYLANTA) 200-200-20 mg/5 mL oral suspension 15 mL, 15 mL, Oral, Q4H PRN, Carissa Willis MD, 15 mL at 01/26/20 1021    ammonium lactate (LAC-HYDRIN) 12 % lotion 1 application, 1 application, Topical, BID PRN, Carissa Willis MD    benzonatate (TESSALON PERLES) capsule 100 mg, 100 mg, Oral, TID PRN, Vandana Breen Kari Carranza MD    benztropine (COGENTIN) injection 1 mg, 1 mg, Intramuscular, Q8H PRN, Tree Madden MD    carbamide peroxide (DEBROX) 6 5 % otic solution 5 drop, 5 drop, Left Ear, BID, Rona Kaba MD, 5 drop at 01/24/20 0824    cloZAPine (CLOZARIL) tablet 25 mg, 25 mg, Oral, BID, Tree Madden MD, 25 mg at 01/26/20 2046    cloZAPine (CLOZARIL) tablet 50 mg, 50 mg, Oral, BID, Tree Madden MD, 50 mg at 01/26/20 2046    EPINEPHrine PF (ADRENALIN) 1 mg/mL injection 0 15 mg, 0 15 mg, Intramuscular, Once PRN, Tree Madden MD    fluticasone-vilanterol (BREO ELLIPTA) 200-25 MCG/INH inhaler 1 puff, 1 puff, Inhalation, Daily, Tree Madden MD, 1 puff at 01/26/20 0915    ketotifen (ZADITOR) 0 025 % ophthalmic solution 1 drop, 1 drop, Right Eye, BID PRN, Tree Madden MD    levothyroxine tablet 125 mcg, 125 mcg, Oral, Early Morning, Tree Madden MD, 125 mcg at 01/27/20 2860    magnesium hydroxide (MILK OF MAGNESIA) 400 mg/5 mL oral suspension 30 mL, 30 mL, Oral, Daily PRN, Tree Madden MD    montelukast (SINGULAIR) tablet 10 mg, 10 mg, Oral, HS, Tree Madden MD, 10 mg at 01/16/20 2106    OLANZapine (ZyPREXA) IM injection 5 mg, 5 mg, Intramuscular, Q8H PRN, Tree Madden MD    OLANZapine (ZyPREXA) tablet 5 mg, 5 mg, Oral, Q8H PRN, Tree Madden MD    ondansetron (ZOFRAN-ODT) dispersible tablet 4 mg, 4 mg, Oral, Q6H PRN, Tree Madden MD, 4 mg at 12/09/19 1757    pantoprazole (PROTONIX) EC tablet 40 mg, 40 mg, Oral, Early Morning, Tree Madden MD, 40 mg at 01/27/20 1549    polyethylene glycol (MIRALAX) packet 17 g, 17 g, Oral, Daily PRN, Tree Madden MD    polyvinyl alcohol (LIQUIFILM TEARS) 1 4 % ophthalmic solution 1 drop, 1 drop, Both Eyes, Q3H PRN, Tree Madden MD    sertraline (ZOLOFT) tablet 150 mg, 150 mg, Oral, Daily, Tree Madden MD, 150 mg at 01/26/20 0915    sucralfate (CARAFATE) oral suspension 1,000 mg, 1,000 mg, Oral, BID, Cat Torres MD, 1,000 mg at 01/27/20 0611    theophylline (JEF-24) 24 hr capsule 200 mg, 200 mg, Oral, Daily, Krystyna Mcclain MD, 200 mg at 01/26/20 0915    tiotropium [facility]) capsule for inhaler 18 mcg, 18 mcg, Inhalation, Daily, Krystyna Mcclain MD, 18 mcg at 01/26/20 0915    traZODone (DESYREL) tablet 25 mg, 25 mg, Oral, HS PRN, Krystyna Mcclain MD  Justification if on more than two antipsychotics:  Not applicable  Side effects if any:     Risks , benefits, side effects and precautions of medications discussed with patient and reminded patient to let us know any problems with medications     Objective:     Vital Signs:  Vitals:    01/26/20 0732 01/26/20 1500 01/26/20 1941 01/26/20 2150   BP: 101/61 98/58 (!) 88/52    BP Location: Left arm Left arm Left arm    Pulse: 69 102 99    Resp:  15 14    Temp:  97 9 °F (36 6 °C) 97 7 °F (36 5 °C)    TempSrc:  Temporal Temporal    SpO2:  91% 90% 90%   Weight:       Height:         Appearance:  Poorly groomed laying in bed and somewhat friendly and cooperative   Behavior:  Preoccupied at times suspicious with fairly good eye contact   Speech:  Coherent relevant but circumstantial tangential and stilted   Mood:  depressed   Affect:  increased in intensity, increased in range and mood-congruent   Thought Process:  goal directed, logical and perserverative   Thought Content:  delusions  somatic as she appears to harbor some psychosomatic symptoms but does not admit to any overt delusional believes systems  No current suicidal homicidal thoughts intent or plans reported     Perceptual Disturbances: None and does not appear responding   Risk Potential: none except for potential for refusing to take care of her ADLs   Sensorium:  person, place, time/date, situation, day of week, month of year, year and time   Cognition:  grossly intact with normal recent and remote memory and immediate recall   Consciousness:  alert and awake    Attention: Intact attention and concentrations   Intellect: Considered to be at least average   Insight:  fair   Judgment: limited Motor Activity: no abnormal movements         Recent Labs:  Results Reviewed     None          I/O Past 24 hours:  No intake/output data recorded  No intake/output data recorded  Assessment / Plan:     Schizoaffective disorder, bipolar type ([facility] [address] )      Reason for continued inpatient care:  He to inconsistency in in attending to ADLs skills  Acceptance by patient:  Accepting  Teena Bashir in recovery:  About living at the [address] personal care boarding  Understanding of medications :  Has some understanding  Involved in reintegration process:  Going out with staff off grounds  Trusting in relatoinship with psychiatrist:  Trusting    Recommended Treatment:    Medication changes:  1) none at this time    Non-pharmacological treatments  1) Continue with individual therapy, group therapy, milieu therapy and occupational therapy using recovery principles and psycho-education about accepting illness and need for treatment   2) encouraged to stick to behavior plan they attending to ADLs skills  Safety  1) Safety/communication plan established targeting dynamic risk factors above  Discharge Plan back to the a:  Personal care boarding home once she is able to go out on therapeutic passes with family and demonstrate a period of stability by consistently attending to ADLs and attending groups    Counseling / Coordination of Care    Total floor / unit time spent today 15 minutes  Greater than 50% of total time was spent with the patient and / or family counseling and / or coordination of care  A description of the counseling / coordination of care  Patient's Rights, confidentiality and exceptions to confidentiality, use of automated medical record, [facility] staff access to medical record, and consent to treatment reviewed      Greer Beltran MD

## 2020-01-27 NOTE — PLAN OF CARE
Problem: Alteration in Thoughts and Perception  Goal: Verbalize thoughts and feelings  Description  Interventions:  - Promote a nonjudgmental and trusting relationship with the patient through active listening and therapeutic communication  - Assess patient's level of functioning, behavior and potential for risk  - Engage patient in 1 on 1 interactions for a minimum of 15 minutes each session  - Encourage patient to express fears, feelings, frustrations, and discuss symptoms    - [place] patient to reality, help patient recognize reality-based thinking   - Administer medications as ordered and assess for potential side effects  - Provide the patient education related to the signs and symptoms of the illness and desired effects of prescribed medications  Outcome: Progressing  Goal: Attend and participate in unit activities, including therapeutic, recreational, and educational groups  Description  Interventions:  - Provide therapeutic and educational activities daily, encourage attendance and participation, and document same in the medical record     CERTIFIED PEER SPECIALIST INTERVENTIONS:    Complete peer assessment with patient to assess their needs and identify their goals to complete while in the recovery program as well as once discharged into the community  Patient will complete WRAP Plan, Crisis Plan and 5 Life Domains  Patient will attend 50% of groups offered on the unit  Patient will complete a goal card weekly      Outcome: Progressing  Goal: Complete daily ADLs, including personal hygiene independently, as able  Description  Interventions:  - Observe, teach, and assist patient with ADLS  - Monitor and promote a balance of rest/activity, with adequate nutrition and elimination   Outcome: Progressing     Problem: Alteration in Orientation  Goal: Interact with staff daily  Description  Interventions:  - Assess and re-assess patient's level of orientation  - Engage patient in 1 on 1 interactions, daily, for a minimum of 15 minutes   - Establish rapport/trust with patient   Outcome: Yoly Warner was visible in the dining room,  with her sister,  at the beginning of the shift  She brought food which Tu Bautista ate  Visit went well  Smiling while conversing with staff  No somatic complaints  Social with select peers when out of her room  Drank her Ensure at supper time  No BM or shower this shift  Napping at times  Did not attend evening group due to being asleep  Took HS medication with prompting  Oxygen saturation 91% on room air prior to oxygen 1 liter applied via nasal cannula  Continue to monitor  Precautions maintained

## 2020-01-27 NOTE — PLAN OF CARE
Problem: RESPIRATORY - ADULT  Goal: Achieves optimal ventilation and oxygenation  Description  INTERVENTIONS:  - Assess for changes in respiratory status  - Assess for changes in mentation and behavior  - Position to facilitate oxygenation and minimize respiratory effort  - Oxygen administration by appropriate delivery method based on oxygen saturation (per order) or ABGs  - Initiate smoking cessation education as indicated  - Encourage broncho-pulmonary hygiene including cough, deep breathe, Incentive Spirometry  - Assess the need for suctioning and aspirate as needed  - Assess and instruct to report SOB or any respiratory difficulty  - Respiratory Therapy support as indicated  Outcome: Progressing     Problem: SLEEP DISTURBANCE  Goal: Will exhibit normal sleeping pattern  Description  Interventions:  -  Assess the patients sleep pattern, noting recent changes  - Administer medication as ordered  - Decrease environmental stimuli, including noise, as appropriate during the night  - Encourage the patient to actively participate in unit groups and or exercise during the day to enhance ability to achieve adequate sleep at night  - Assess the patient, in the morning, encouraging a description of sleep experience  Outcome: Progressing    Patient in bed, asleep at shift onset  Utilizing 1L humidified oxygen via NC  No respiratory distress  Slept throughout the night  No behaviors or issues noted

## 2020-01-27 NOTE — PROGRESS NOTES
01/24/20 1100   Activity/Group Checklist   Group Other (Comment)  (IMR - Weekly Goal Review)   Attendance Attended   Attendance Duration (min) 46-60   Interactions Interacted appropriately   Affect/Mood Appropriate   Goals Achieved Able to self-disclose; Able to recieve feedback; Able to give feedback to another;Verbalized increased hopefulness; Able to listen to others; Able to engage in interactions; Identified feelings; Discussed coping strategies     Patient attended [facility] - Weekly Goal Review  She reported that she completed her goals of attended [facility] all week, went off-grounds, and showered as requested  Patient did need redirection from "gossiping" about discharged patients  Patient participated and was respectful of peers

## 2020-01-27 NOTE — PLAN OF CARE

## 2020-01-28 NOTE — PROGRESS NOTES
01/28/20 0900   Team Meeting   Meeting Type Daily Rounds   Team Members Present   Team Members Present Physician;Nurse;; Other (Discipline and Name)   Physician Team Member Dr Darra Fothergill, RN   Care Management Team Member Brenda Pham   Other (Discipline and Name) [place] LCSW; SHANIKA Meza     Patient attended [facility] and 3-11 groups  Slept

## 2020-01-28 NOTE — PROGRESS NOTES
01/28/20 1100   Activity/Group Checklist   Group Other (Comment)  (IMR)   Attendance Attended   Attendance Duration (min) 46-60   Interactions Interacted appropriately   Affect/Mood Appropriate   Goals Achieved Identified feelings;Verbalized increased hopefulness; Able to manage/cope with feelings; Able to reflect/comment on own behavior;Able to engage in interactions     Patient attended group today  Session focused on the use of positive thinking  Patient reflected on how positive thinking can help to navigate the frustration experienced on the unit or as they wait for discharge  The patient were taught about the benefits of practicing positive thinking such as lowering one's stress level and increasing hopefulness for the future  Patient were asked to identify a time when they have practice a positive thought or action towards themselves or others in the past week  Patient stated that she able to express her concern for a peer who was sick in the past week  Patient was reinforced for performing a positive action towards a peer

## 2020-01-28 NOTE — PROGRESS NOTES
Progress Note - Arvil Model 1955, 60 y o  female MRN: [id number]    Unit/Bed#: [facility] 110-02 Encounter: [id number]    Primary Care Provider: Galen Torres PA-C   Date and time admitted to hospital: 7/23/2019  5:30 PM        * Schizoaffective disorder, bipolar type [facility])  Assessment & Plan  Psychiatry Progress Note  Patient has been attending more than 50% of groups including [facility] and did take a shower 2 days ago  She was friendly pleasant and attended team meeting  She still appears to harbor some underlying paranoia about people out to harm her but has not expressed any overt delusions about her medications being tampered with or staff tampering with her oxygen concentrator all her spirometer or inhalant lately  She still expresses some psychosomatic symptoms  He is better groomed and not disheveled a and is eager to get out and understands that she needs to go out again with staff to make sure the she complies with staff directions following which he will be allowed to go out with her sister on a pass off the unit  She does not admit to any hallucinations or overt delusions otherwise even though her demeanor indicates that she might be still paranoid  Tolerating medications without any side effects  Is better and sleeping well and no complaints     Current medications:    Current Facility-Administered Medications:     acetaminophen (TYLENOL) tablet 325 mg, 325 mg, Oral, Q6H PRN, Jeffrey Gallegos MD    acetaminophen (TYLENOL) tablet 650 mg, 650 mg, Oral, Q6H PRN, Jeffrey Gallegos MD    acetaminophen (TYLENOL) tablet 650 mg, 650 mg, Oral, Q8H PRN, Jeffrey Gallegos MD    albuterol (PROVENTIL HFA,VENTOLIN HFA) inhaler 2 puff, 2 puff, Inhalation, Q4H PRN, Jeffrey Gallegos MD, 2 puff at 10/11/19 0424    aluminum-magnesium hydroxide-simethicone (MYLANTA) 200-200-20 mg/5 mL oral suspension 15 mL, 15 mL, Oral, Q4H PRN, Jeffrey Gallegos MD, 15 mL at 01/26/20 1021    ammonium lactate (LAC-HYDRIN) 12 % lotion 1 application, 1 application, Topical, BID PRN, Deep Durand MD    benzonatate (TESSALON PERLES) capsule 100 mg, 100 mg, Oral, TID PRN, Deep Durand MD    benztropine (COGENTIN) injection 1 mg, 1 mg, Intramuscular, Q8H PRN, Deep Durand MD    carbamide peroxide (DEBROX) 6 5 % otic solution 5 drop, 5 drop, Left Ear, BID, Rona Nogueira MD, 5 drop at 01/24/20 0824    cloZAPine (CLOZARIL) tablet 25 mg, 25 mg, Oral, BID, Deep Durand MD, 25 mg at 01/27/20 2027    cloZAPine (CLOZARIL) tablet 50 mg, 50 mg, Oral, BID, Deep Durand MD, 50 mg at 01/27/20 2027    EPINEPHrine PF (ADRENALIN) 1 mg/mL injection 0 15 mg, 0 15 mg, Intramuscular, Once PRN, Deep Durand MD    fluticasone-vilanterol (BREO ELLIPTA) 200-25 MCG/INH inhaler 1 puff, 1 puff, Inhalation, Daily, Deep Durand MD, 1 puff at 01/28/20 5200    ketotifen (ZADITOR) 0 025 % ophthalmic solution 1 drop, 1 drop, Right Eye, BID PRN, Deep Durand MD    levothyroxine tablet 125 mcg, 125 mcg, Oral, Early Morning, Deep Durand MD, 125 mcg at 01/28/20 0604    magnesium hydroxide (MILK OF MAGNESIA) 400 mg/5 mL oral suspension 30 mL, 30 mL, Oral, Daily PRN, Deep Durand MD    montelukast (SINGULAIR) tablet 10 mg, 10 mg, Oral, HS, Deep Durand MD, 10 mg at 01/16/20 2106    OLANZapine (ZyPREXA) IM injection 5 mg, 5 mg, Intramuscular, Q8H PRN, Deep Durand MD    OLANZapine (ZyPREXA) tablet 5 mg, 5 mg, Oral, Q8H PRN, Deep Durand MD    ondansetron (ZOFRAN-ODT) dispersible tablet 4 mg, 4 mg, Oral, Q6H PRN, Deep Durand MD, 4 mg at 12/09/19 1757    pantoprazole (PROTONIX) EC tablet 40 mg, 40 mg, Oral, Early Morning, Deep Durand MD, 40 mg at 01/28/20 0604    polyethylene glycol (MIRALAX) packet 17 g, 17 g, Oral, Daily PRN, Deep Durand MD    polyvinyl alcohol (LIQUIFILM TEARS) 1 4 % ophthalmic solution 1 drop, 1 drop, Both Eyes, Q3H PRN, Deep Durand MD    sertraline (ZOLOFT) tablet 150 mg, 150 mg, Oral, Daily, Deep Durand MD, 150 mg at 01/28/20 4384    sucralfate (CARAFATE) oral suspension 1,000 mg, 1,000 mg, Oral, BID, Cat Torres MD, 1,000 mg at 01/28/20 0604    theophylline (JEF-24) 24 hr capsule 200 mg, 200 mg, Oral, Daily, Meredith Wesley MD, 200 mg at 01/28/20 0208    tiotropium [facility]) capsule for inhaler 18 mcg, 18 mcg, Inhalation, Daily, Meredith Wesley MD, 18 mcg at 01/28/20 3541    traZODone (DESYREL) tablet 25 mg, 25 mg, Oral, HS PRN, Meredith Wesley MD  Justification if on more than two antipsychotics:  Not applicable  Side effects if any:  None reported    Risks , benefits, side effects and precautions of medications discussed with patient and reminded patient to let us know any problems with medications     Objective:     Vital Signs:  Vitals:    01/27/20 1600 01/27/20 1945 01/28/20 0500 01/28/20 0900   BP: 106/77 100/62  113/71   BP Location: Left arm Left arm  Left arm   Pulse: 84 76  88   Resp: 16 16  18   Temp: 98 3 °F (36 8 °C) (!) 97 1 °F (36 2 °C)  (!) 97 °F (36 1 °C)   TempSrc: Temporal Temporal  Temporal   SpO2: 91% 92% 93% 91%   Weight:       Height:         Appearance:  Better groomed but still somewhat disheveled casually dressed   Behavior:  Pleasant friendly polite but tends to become suspicious and guarded at times   Speech:  Linear logical goal directed but loud at times and stilted   Mood:  Anxious   Affect:  Grandiose with sense of entitlement and occasional irritability   Thought Process:  Still with stilted speech and some circumstantiality   Thought Content:  No current suicidal homicidal thoughts intent or plans reported  No overt delusions elicited other than some vague paranoia but no longer believes there is electronic bugs on her arm  Has a tendency to question motives of certain staff in adjusting her oxygen concentrator     Perceptual Disturbances: None review   Risk Potential: Propensity to not care for herself   Sensorium:  Oriented to 3 spheres and to situation   Cognition:  Intact with no deficit in recent remote or immediate recall   Consciousness:  Alert and awake at all   Attention: Attention and concentration span intact   Intellect: Considered to at least average intelligence   Insight:  Some improved   Judgment:  getting better      Motor Activity: No abnormal movement         Recent Labs:  Results Reviewed     None          I/O Past 24 hours:  No intake/output data recorded  No intake/output data recorded  Assessment / Plan:     Schizoaffective disorder, bipolar type ([facility] [address] )      Reason for continued inpatient care:  Needs to demonstrate a period of stability when she will be attending to ADLs skills and cooperate with the treatment before placement at a personal care home again   Acceptance by patient:  Accepting  Hopefulness in recovery:  Going back to the [facility]  Understanding of medications :  Displays some understand  Involved in reintegration process:  Able to go off unit with staff  Trusting in relatoinship with psychiatrist:  Trusting    Recommended Treatment:    Medication changes:  1) no changes today  Non-pharmacological treatments  1) continue with individual group milieu treatment with multidisciplinary team approach with Psychology nursing psychotherapy social work case management home appears of etc provide psychoeducation as need to comply with treatment   2) continue to encourage to cooperate with behavior  3) continue to encourage to attend to ADLs  4) see how she does with staff off grounds privileges and then see how she does on therapeutic pass with her sister  Safety  1) Safety/communication plan established targeting dynamic risk factors above  Discharge Plan [facility] [place] [facility]   Counseling / Coordination of Care    Total floor / unit time spent today 15 minutes  Greater than 50% of total time was spent with the patient and / or family counseling and / or coordination of care  A description of the counseling / coordination of care  Patient's Rights, confidentiality and exceptions to confidentiality, use of automated medical record, [facility] staff access to medical record, and consent to treatment reviewed      Roberto Shankar MD

## 2020-01-28 NOTE — PROGRESS NOTES
01/28/20 0929   Team Meeting   Meeting Type Tx Team Meeting   Initial Conference Date 01/28/20   Next Conference Date 02/04/20   Team Members Present   Team Members Present Physician;Nurse;; Other (Discipline and Name)   Physician Team Member Dr Donta Patton Team Member Tonny Santiago, LISA   Care Management Team Member Brenda Bah   Other (Discipline and Name) Leta Tamez, JONATHON; Kan Renteria, [facility]   Patient/Family Present   Patient Present Yes   Patient's Family Present No     Patient attended treatment team meeting this morning without her self assessment completed  Patient attended 59% of groups offered last week  Team discussed with patient her pass that occurred last week with staff to take her off grounds for the first time  Instead of going to [facility] for coffee as planned, she asked to go to [facility] and then a [employer]  Team informed her that by not following through on the plan that was made with the team, the team is going to request she do another off grounds with staff pass to ensure the team can trust she can follow directions for when she is able to go out on passes with her sister  Patient expressed understanding and agreed to try going to [facility] in [place] this time  Team and patient completed risk assessment and the patient did not verbalize any desire to elope from the program  Patient verbalized understanding of consequences of eloping from treatment while on a commitment  Patient verbalized no further questions or concerns at the conclusion of the meeting

## 2020-01-28 NOTE — PLAN OF CARE
Problem: Ineffective Coping  Goal: Identifies ineffective coping skills  Outcome: Not Progressing  Goal: Identifies healthy coping skills  Outcome: Not Progressing  Goal: Demonstrates healthy coping skills  Outcome: Not Progressing  Goal: Participates in unit activities  Description  Interventions:  - Provide therapeutic environment   - Provide required programming   - Redirect inappropriate behaviors   Outcome: Progressing     Problem: Alteration in Thoughts and Perception  Goal: Attend and participate in unit activities, including therapeutic, recreational, and educational groups  Description  Interventions:  - Provide therapeutic and educational activities daily, encourage attendance and participation, and document same in the medical record     CERTIFIED PEER SPECIALIST INTERVENTIONS:    Complete peer assessment with patient to assess their needs and identify their goals to complete while in the recovery program as well as once discharged into the community  Patient will complete WRAP Plan, Crisis Plan and 5 Life Domains  Patient will attend 50% of groups offered on the unit  Patient will complete a goal card weekly  Outcome: Progressing     Patient has been able to obtain 59% group attendance which has declined from the previous week  Patient appears to be spending more time in bed than she had been for no apparent reason  Patient participates in every IMR group she attends and is always on topic  Patient is comfortable in groups enough to share personal details of her own childhood, family issues and her own personal recovery  Patient has not been focused on her physical/health issues for a very long time and seems to be getting more out of group than she had in the past  Patient cooperative, polite and humorous at times  Patient has not shown interest in completing a WRAP Plan, Life Domains or BH Relapse Prevention plan at this time   When writer brings the previous up and weekly Goal Cards, Patient continues to give excuses but does not ask for help  Patient is encouraged to develop coping skills however, struggles to problem solve  Writer will continue to support and encourage Patients recovery efforts

## 2020-01-28 NOTE — PLAN OF CARE
Problem: Alteration in Thoughts and Perception  Goal: Agree to be compliant with medication regime, as prescribed and report medication side effects  Description  Interventions:  - Offer appropriate PRN medication and supervise ingestion; conduct aims, as needed   Outcome: Progressing  Goal: Attend and participate in unit activities, including therapeutic, recreational, and educational groups  Description  Interventions:  - Provide therapeutic and educational activities daily, encourage attendance and participation, and document same in the medical record     CERTIFIED PEER SPECIALIST INTERVENTIONS:    Complete peer assessment with patient to assess their needs and identify their goals to complete while in the recovery program as well as once discharged into the community  Patient will complete WRAP Plan, Crisis Plan and 5 Life Domains  Patient will attend 50% of groups offered on the unit  Patient will complete a goal card weekly  Outcome: Progressing  Goal: Complete daily ADLs, including personal hygiene independently, as able  Description  Interventions:  - Observe, teach, and assist patient with ADLS  - Monitor and promote a balance of rest/activity, with adequate nutrition and elimination   Outcome: Jannette Maradiaga remains disheveled in appearance and isolative to her room in between select groups and in between meals choosing to nap in bed  Med and meal compliant otherwise  Behaviors controlled and appropriate  No complaints of discomfort reported or observed at this time  Will continue to monitor for changes

## 2020-01-28 NOTE — PLAN OF CARE
Problem: Alteration in Thoughts and Perception  Goal: Agree to be compliant with medication regime, as prescribed and report medication side effects  Description  Interventions:  - Offer appropriate PRN medication and supervise ingestion; conduct aims, as needed   Outcome: Progressing  Goal: Complete daily ADLs, including personal hygiene independently, as able  Description  Interventions:  - Observe, teach, and assist patient with ADLS  - Monitor and promote a balance of rest/activity, with adequate nutrition and elimination   Outcome: Progressing     Problem: Alteration in Orientation  Goal: Interact with staff daily  Description  Interventions:  - Assess and re-assess patient's level of orientation  - Engage patient in 1 on 1 interactions, daily, for a minimum of 15 minutes   - Establish rapport/trust with patient   Outcome: Progressing     Problem: Alteration in Thoughts and Perception  Goal: Treatment Goal: Gain control of psychotic behaviors/thinking, reduce/eliminate presenting symptoms and demonstrate improved reality functioning upon discharge  Outcome: Not Progressing     Pt isolative and withdrawn throughout shift  Pt refused group  Ate 100% of dinner plus snack  Compliant with HS meds without prompting, other than Singulair  Some somatic complaints regarding her hands feeling tight d/t flushing the toilet, however pt is redirectable  No issues or outbursts  Will continue to monitor

## 2020-01-29 NOTE — PROGRESS NOTES
01/29/20 1300   Team Meeting   Meeting Type Daily Rounds   Team Members Present   Team Members Present Physician;Nurse;; Other (Discipline and Name)   Physician Team Member Dr Birgit Castro Team Member Desmond Dawn RN   Care Management Team Member Brenda Green   Other (Discipline and Name) Jasvir Hopkins LCSW; SHANIKA Sandoval     Patient was needy and attention seeking yesterday  Last showered on 01/25  Slept

## 2020-01-29 NOTE — PROGRESS NOTES
Not required to attend      01/28/20 1280   Activity/Group Checklist   Group   (Community Programming Wrap up )   Attendance Did not attend   Attendance Duration (min) 16-30   Affect/Mood IRMA

## 2020-01-29 NOTE — PLAN OF CARE
Problem: Alteration in Thoughts and Perception  Goal: Agree to be compliant with medication regime, as prescribed and report medication side effects  Description  Interventions:  - Offer appropriate PRN medication and supervise ingestion; conduct aims, as needed   Outcome: Progressing  Goal: Attend and participate in unit activities, including therapeutic, recreational, and educational groups  Description  Interventions:  - Provide therapeutic and educational activities daily, encourage attendance and participation, and document same in the medical record     CERTIFIED PEER SPECIALIST INTERVENTIONS:    Complete peer assessment with patient to assess their needs and identify their goals to complete while in the recovery program as well as once discharged into the community  Patient will complete WRAP Plan, Crisis Plan and 5 Life Domains  Patient will attend 50% of groups offered on the unit  Patient will complete a goal card weekly      Outcome: Progressing     Problem: Ineffective Coping  Goal: Patient/Family verbalizes awareness of resources  Outcome: Progressing  Goal: Understands least restrictive measures  Description  Interventions:  - Utilize least restrictive behavior  Outcome: Progressing     Problem: Risk for Self Injury/Neglect  Goal: Treatment Goal: Remain safe during length of stay, learn and adopt new coping skills, and be free of self-injurious ideation, impulses and acts at the time of discharge  Outcome: Progressing  Goal: Verbalize thoughts and feelings  Description  Interventions:  - Assess and re-assess patient's lethality and potential for self-injury  - Engage patient in 1:1 interactions, daily, for a minimum of 15 minutes  - Encourage patient to express feelings, fears, frustrations, hopes  - Establish rapport/trust with patient   Outcome: Progressing  Goal: Refrain from harming self  Description  Interventions:  - Monitor patient closely, per order  - Develop a trusting relationship  - Supervise medication ingestion, monitor effects and side effects   Outcome: Progressing  Goal: Attend and participate in unit activities, including therapeutic, recreational, and educational groups  Description  Interventions:  - Provide therapeutic and educational activities daily, encourage attendance and participation, and document same in the medical record  - Obtain collateral information, encourage visitation and family involvement in care   Outcome: Progressing     Problem: Depression  Goal: Verbalize thoughts and feelings  Description  Interventions:  - Assess and re-assess patient's level of risk   - Engage patient in 1:1 interactions, daily, for a minimum of 15 minutes   - Encourage patient to express feelings, fears, frustrations, hopes   Outcome: Progressing     Problem: Alteration in Orientation  Goal: Interact with staff daily  Description  Interventions:  - Assess and re-assess patient's level of orientation  - Engage patient in 1 on 1 interactions, daily, for a minimum of 15 minutes   - Establish rapport/trust with patient   Outcome: Progressing     Problem: PAIN - ADULT  Goal: Verbalizes/displays adequate comfort level or baseline comfort level  Description  Interventions:  - Encourage patient to monitor pain and request assistance  - Assess pain using appropriate pain scale  - Administer analgesics based on type and severity of pain and evaluate response  - Implement non-pharmacological measures as appropriate and evaluate response  - Consider cultural and social influences on pain and pain management  - Notify physician/advanced practitioner if interventions unsuccessful or patient reports new pain  Outcome: Progressing     Problem: SAFETY ADULT  Goal: Patient will remain free of falls  Description  INTERVENTIONS:  - Assess patient frequently for physical needs  -  Identify cognitive and physical deficits and behaviors that affect risk of falls    -  [place] fall precautions as indicated by assessment   - Educate patient/family on patient safety including physical limitations  - Instruct patient to call for assistance with activity based on assessment  - Modify environment to reduce risk of injury  - Consider OT/PT consult to assist with strengthening/mobility  Outcome: Progressing     Problem: Depression  Goal: Refrain from isolation  Description  Interventions:  - Develop a trusting relationship   - Encourage socialization   Outcome: Not Progressing     Problem: Nutrition/Hydration-ADULT  Goal: Nutrient/Hydration intake appropriate for improving, restoring or maintaining nutritional needs  Description  Monitor and assess patient's nutrition/hydration status for malnutrition  Collaborate with interdisciplinary team and initiate plan and interventions as ordered  Monitor patient's weight and dietary intake as ordered or per policy  Utilize nutrition screening tool and intervene as necessary  Determine patient's food preferences and provide high-protein, high-caloric foods as appropriate  INTERVENTIONS:  - Monitor oral intake, urinary output, labs, and treatment plans  - Assess nutrition and hydration status and recommend course of action  - Evaluate amount of meals eaten  - Assist patient with eating if necessary   - Allow adequate time for meals  - Recommend/ encourage appropriate diets, oral nutritional supplements, and vitamin/mineral supplements  - Order, calculate, and assess calorie counts as needed  - Recommend, monitor, and adjust tube feedings and TPN/PPN based on assessed needs  - Assess need for intravenous fluids  - Provide specific nutrition/hydration education as appropriate  - Include patient/family/caregiver in decisions related to nutrition  Outcome: Not Progressing   Mostly isolative to her bed, out for meds and meals, attended IMR group  Ate 30% of breakfast and 25% of lunch  No somatic complaints voiced, no issues noted  Continue to monitor

## 2020-01-29 NOTE — ASSESSMENT & PLAN NOTE
Psychiatry Progress Note  Patient remains preoccupied lays back on bed most of the time but has been attending more than 50% of groups but has not done any initiative in completing her wrap plan even though she claims she is almost ready for discharge  She has been attending IMR groups but does not attend to her ADLs skills all the time taking showers every 4 or 5 days instead of twice a week as is required  She states she took a shower last on Sunday and is prone to take a shower today  She does appear somewhat disheveled poorly kept as usual   She still comes up with occasional psychosomatic symptoms but no overt delusions elicited other than some vague paranoia about certain staff  He has been compliant with medications and no side effects reported  He is in touch with her son and the med at least and is still hoping to go on another day pass with staff escort following which he plans to go on a pass with her sister  She is still intent on starting to smoke again despite having COPD and need for oxygen concentrator at night despite counseling as to the consequences he she resume smoking  Tolerating medications without any side effects so far    Labs acceptable for clozapine   Current medications:    Current Facility-Administered Medications:     acetaminophen (TYLENOL) tablet 325 mg, 325 mg, Oral, Q6H PRN, Prakash Brewster MD    acetaminophen (TYLENOL) tablet 650 mg, 650 mg, Oral, Q6H PRN, Prakash Brewster MD    acetaminophen (TYLENOL) tablet 650 mg, 650 mg, Oral, Q8H PRN, Prakash Brewster MD    albuterol (PROVENTIL HFA,VENTOLIN HFA) inhaler 2 puff, 2 puff, Inhalation, Q4H PRN, Prakash Brewster MD, 2 puff at 10/11/19 0424    aluminum-magnesium hydroxide-simethicone (MYLANTA) 200-200-20 mg/5 mL oral suspension 15 mL, 15 mL, Oral, Q4H PRN, Prakash Brewster MD, 15 mL at 01/26/20 1021    ammonium lactate (LAC-HYDRIN) 12 % lotion 1 application, 1 application, Topical, BID PRN, Prakash Brewster MD    benzonatate (TESSALON PERLES) capsule 100 mg, 100 mg, Oral, TID PRN, Jennifer Taveras MD    benztropine (COGENTIN) injection 1 mg, 1 mg, Intramuscular, Q8H PRN, Jennifer Taveras MD    carbamide peroxide (DEBROX) 6 5 % otic solution 5 drop, 5 drop, Left Ear, BID, Rona Pérez MD, 5 drop at 01/28/20 1740    cloZAPine (CLOZARIL) tablet 25 mg, 25 mg, Oral, BID, Jennifer Taveras MD, 25 mg at 01/28/20 2123    cloZAPine (CLOZARIL) tablet 50 mg, 50 mg, Oral, BID, Jennifer Taveras MD, 50 mg at 01/28/20 2122    EPINEPHrine PF (ADRENALIN) 1 mg/mL injection 0 15 mg, 0 15 mg, Intramuscular, Once PRN, Jennifer Taveras MD    fluticasone-vilanterol (BREO ELLIPTA) 200-25 MCG/INH inhaler 1 puff, 1 puff, Inhalation, Daily, Jennifer Taveras MD, 1 puff at 01/28/20 6715    ketotifen (ZADITOR) 0 025 % ophthalmic solution 1 drop, 1 drop, Right Eye, BID PRN, Jennifer Taveras MD    levothyroxine tablet 125 mcg, 125 mcg, Oral, Early Morning, Jennifer Taveras MD, 125 mcg at 01/29/20 0617    magnesium hydroxide (MILK OF MAGNESIA) 400 mg/5 mL oral suspension 30 mL, 30 mL, Oral, Daily PRN, Jennifer Taveras MD    montelukast (SINGULAIR) tablet 10 mg, 10 mg, Oral, HS, Jennifer Taveras MD, 10 mg at 01/16/20 2106    OLANZapine (ZyPREXA) IM injection 5 mg, 5 mg, Intramuscular, Q8H PRN, Jennifer Taveras MD    OLANZapine (ZyPREXA) tablet 5 mg, 5 mg, Oral, Q8H PRN, Jennifer Taveras MD    ondansetron (ZOFRAN-ODT) dispersible tablet 4 mg, 4 mg, Oral, Q6H PRN, Jennifer Taveras MD, 4 mg at 12/09/19 1757    pantoprazole (PROTONIX) EC tablet 40 mg, 40 mg, Oral, Early Morning, Jennifer Taveras MD, 40 mg at 01/29/20 0617    polyethylene glycol (MIRALAX) packet 17 g, 17 g, Oral, Daily PRN, Jennifer Taveras MD    polyvinyl alcohol (LIQUIFILM TEARS) 1 4 % ophthalmic solution 1 drop, 1 drop, Both Eyes, Q3H PRN, Jennifer Taveras MD    sertraline (ZOLOFT) tablet 150 mg, 150 mg, Oral, Daily, Jennifer Taveras MD, 150 mg at 01/28/20 6315    sucralfate (CARAFATE) oral suspension 1,000 mg, 1,000 mg, Oral, BID, Cat Torres MD, 1,000 mg at 01/29/20 0617    theophylline (JEF-24) 24 hr capsule 200 mg, 200 mg, Oral, Daily, Roberto Shankar MD, 200 mg at 01/28/20 7392    tiotropium [facility]) capsule for inhaler 18 mcg, 18 mcg, Inhalation, Daily, Roberto Shankar MD, 18 mcg at 01/28/20 8058    traZODone (DESYREL) tablet 25 mg, 25 mg, Oral, HS PRN, Roberto Shankar MD  Justification if on more than two antipsychotics:  Not applicable  Side effects if any:  Not    Risks , benefits, side effects and precautions of medications discussed with patient and reminded patient to let us know any problems with medications     Objective:     Vital Signs:  Vitals:    01/28/20 2000 01/28/20 2135 01/29/20 0629 01/29/20 0700   BP: 92/54   104/68   BP Location: Left arm   Right arm   Pulse: 68   78   Resp: 18   18   Temp: (!) 97 3 °F (36 3 °C)   97 8 °F (36 6 °C)   TempSrc: Temporal      SpO2: 96% 92% 95% 95%   Weight:       Height:         Appearance:  Poorly groomed somewhat disheveled casually dressed her uncombed laying on bed with fair eye contact   Behavior:  Polite friendly on approach but appears is dishes and paranoid and somewhat entitled at times   Speech:  Goal-directed logical but loud and stilted at   Mood:  Anxious as he   Affect:  Grandiose and at times annoyed with occasional irritability and    Thought Process:  Tendency to become circumstantial and stilted as if in a court of law   Thought Content:  No overt delusions elicited other than some vague paranoia about certain staff tampering with her spiral meter and oxygen concentrator correct times  Still psychosomatic about not being able to breathe was swallow which comes and goes  No current suicidal homicidal thoughts and under plans reported     Perceptual Disturbances: None reported does not appear responding   Risk Potential: Potential for inability to care for herself based on history   Sensorium:  Fully oriented 3 spheres and to situation   Cognition:  Intact with no deficit in recent and remote memory or immediate   Consciousness:  Alert and awake   Attention: With attention and concentration span are intact   Intellect: Estimated to be at least average   Insight:  Improving   Judgment: Limited      Motor Activity: No abnormal movements not         Recent Labs:  Results Reviewed     None          I/O Past 24 hours:  No intake/output data recorded  No intake/output data recorded  Assessment / Plan:     Schizoaffective disorder, bipolar type ([facility] [address] )      Reason for continued inpatient care:  Needs to show consistency in improvement by taking showers twice a week and wearing clean clothes and attending groups and going on therapeutic passes to [employer] with staff as well as with family before sending her back to the [facility] personal care home boarding   Acceptance by patient:  [place]  Hopefulness in recovery:  Going back to the [facility] personal care  Understanding of medications :  Understanding  Involved in reintegration process: On escorted walks off grounds with staff  Trusting in relatoinship with psychiatrist:  Trusting    Recommended Treatment:    Medication changes:  1) none today  Non-pharmacological treatments  1) continue individual group milieu therapy and psychoeducation with  disciplinary team approach using recovery principles on the extended acute care unit   2) continue in cooperate with behavior plan  3) continue to encourage to attend to ADLs  4) continue go ground privileges and see how she does on therapeutic pass with his sister in future  Safety  1) Safety/communication plan established targeting dynamic risk factors above  Discharge Plan for for the personal care boarding   Counseling / Coordination of Care    Total floor / unit time spent today 15 minutes  Greater than 50% of total time was spent with the patient and / or family counseling and / or coordination of care  A description of the counseling / coordination of care       Patient's Rights, confidentiality and exceptions to confidentiality, use of automated medical record, [address] staff access to medical record, and consent to treatment reviewed      Christian Bennett MD

## 2020-01-29 NOTE — PROGRESS NOTES
Progress Note - Erika Hsuter 1955, 60 y o  female MRN: [id number]    Unit/Bed#: [facility] 110-02 Encounter: [id number]    Primary Care Provider: Sheron Rangel PA-C   Date and time admitted to hospital: 7/23/2019  5:30 PM        * Schizoaffective disorder, bipolar type [facility])  Assessment & Plan  Psychiatry Progress Note  Patient remains preoccupied lays back on bed most of the time but has been attending more than 50% of groups but has not done any initiative in completing her wrap plan even though she claims she is almost ready for discharge  She has been attending IMR groups but does not attend to her ADLs skills all the time taking showers every 4 or 5 days instead of twice a week as is required  She states she took a shower last on Sunday and is prone to take a shower today  She does appear somewhat disheveled poorly kept as usual   She still comes up with occasional psychosomatic symptoms but no overt delusions elicited other than some vague paranoia about certain staff  He has been compliant with medications and no side effects reported  He is in touch with her son and the med at least and is still hoping to go on another day pass with staff escort following which he plans to go on a pass with her sister  She is still intent on starting to smoke again despite having COPD and need for oxygen concentrator at night despite counseling as to the consequences he she resume smoking  Tolerating medications without any side effects so far    Labs acceptable for clozapine   Current medications:    Current Facility-Administered Medications:     acetaminophen (TYLENOL) tablet 325 mg, 325 mg, Oral, Q6H PRN, Meredith Wesley MD    acetaminophen (TYLENOL) tablet 650 mg, 650 mg, Oral, Q6H PRN, Meredith Wesley MD    acetaminophen (TYLENOL) tablet 650 mg, 650 mg, Oral, Q8H PRN, Meredith Wesley MD    albuterol (PROVENTIL HFA,VENTOLIN HFA) inhaler 2 puff, 2 puff, Inhalation, Q4H PRN, Meredith Wesley MD, 2 puff at 10/11/19 0424    aluminum-magnesium hydroxide-simethicone (MYLANTA) 200-200-20 mg/5 mL oral suspension 15 mL, 15 mL, Oral, Q4H PRN, Tree Madden MD, 15 mL at 01/26/20 1021    ammonium lactate (LAC-HYDRIN) 12 % lotion 1 application, 1 application, Topical, BID PRN, Tree Madden MD    benzonatate (TESSALON PERLES) capsule 100 mg, 100 mg, Oral, TID PRN, Tree Madden MD    benztropine (COGENTIN) injection 1 mg, 1 mg, Intramuscular, Q8H PRN, Tree Madden MD    carbamide peroxide (DEBROX) 6 5 % otic solution 5 drop, 5 drop, Left Ear, BID, Rona Kaba MD, 5 drop at 01/28/20 1740    cloZAPine (CLOZARIL) tablet 25 mg, 25 mg, Oral, BID, Tree Madden MD, 25 mg at 01/28/20 2123    cloZAPine (CLOZARIL) tablet 50 mg, 50 mg, Oral, BID, Tree Madden MD, 50 mg at 01/28/20 2122    EPINEPHrine PF (ADRENALIN) 1 mg/mL injection 0 15 mg, 0 15 mg, Intramuscular, Once PRN, Tree Madden MD    fluticasone-vilanterol (BREO ELLIPTA) 200-25 MCG/INH inhaler 1 puff, 1 puff, Inhalation, Daily, Tree Madden MD, 1 puff at 01/28/20 3715    ketotifen (ZADITOR) 0 025 % ophthalmic solution 1 drop, 1 drop, Right Eye, BID PRN, Tree Madden MD    levothyroxine tablet 125 mcg, 125 mcg, Oral, Early Morning, Tree Madden MD, 125 mcg at 01/29/20 0617    magnesium hydroxide (MILK OF MAGNESIA) 400 mg/5 mL oral suspension 30 mL, 30 mL, Oral, Daily PRN, Tree Madden MD    montelukast (SINGULAIR) tablet 10 mg, 10 mg, Oral, HS, Tree Madden MD, 10 mg at 01/16/20 2106    OLANZapine (ZyPREXA) IM injection 5 mg, 5 mg, Intramuscular, Q8H PRN, Tree Madden MD    OLANZapine (ZyPREXA) tablet 5 mg, 5 mg, Oral, Q8H PRN, Tree Madden MD    ondansetron (ZOFRAN-ODT) dispersible tablet 4 mg, 4 mg, Oral, Q6H PRN, Tree Madden MD, 4 mg at 12/09/19 1757    pantoprazole (PROTONIX) EC tablet 40 mg, 40 mg, Oral, Early Morning, Tree Madden MD, 40 mg at 01/29/20 0617    polyethylene glycol (MIRALAX) packet 17 g, 17 g, Oral, Daily PRN, Tree Madden MD    polyvinyl alcohol (LIQUIFILM TEARS) 1 4 % ophthalmic solution 1 drop, 1 drop, Both Eyes, Q3H PRN, Faheem Daniels MD    sertraline (ZOLOFT) tablet 150 mg, 150 mg, Oral, Daily, Faheem Daniels MD, 150 mg at 01/28/20 7116    sucralfate (CARAFATE) oral suspension 1,000 mg, 1,000 mg, Oral, BID, Jacob Mario MD, 1,000 mg at 01/29/20 0617    theophylline (JEF-24) 24 hr capsule 200 mg, 200 mg, Oral, Daily, Faheem Daniels MD, 200 mg at 01/28/20 6642    tiotropium [facility]) capsule for inhaler 18 mcg, 18 mcg, Inhalation, Daily, Faheem Daniels MD, 18 mcg at 01/28/20 2941    traZODone (DESYREL) tablet 25 mg, 25 mg, Oral, HS PRN, Faheem Daniels MD  Justification if on more than two antipsychotics:  Not applicable  Side effects if any:  Not    Risks , benefits, side effects and precautions of medications discussed with patient and reminded patient to let us know any problems with medications     Objective:     Vital Signs:  Vitals:    01/28/20 2000 01/28/20 2135 01/29/20 0629 01/29/20 0700   BP: 92/54   104/68   BP Location: Left arm   Right arm   Pulse: 68   78   Resp: 18   18   Temp: (!) 97 3 °F (36 3 °C)   97 8 °F (36 6 °C)   TempSrc: Temporal      SpO2: 96% 92% 95% 95%   Weight:       Height:         Appearance:  Poorly groomed somewhat disheveled casually dressed her uncombed laying on bed with fair eye contact   Behavior:  Polite friendly on approach but appears is dishes and paranoid and somewhat entitled at times   Speech:  Goal-directed logical but loud and stilted at   Mood:  Anxious as he   Affect:  Grandiose and at times annoyed with occasional irritability and    Thought Process:  Tendency to become circumstantial and stilted as if in a court of law   Thought Content:  No overt delusions elicited other than some vague paranoia about certain staff tampering with her spiral meter and oxygen concentrator correct times  Still psychosomatic about not being able to breathe was swallow which comes and goes    No current suicidal homicidal thoughts and under plans reported  Perceptual Disturbances: None reported does not appear responding   Risk Potential: Potential for inability to care for herself based on history   Sensorium:  Fully oriented 3 spheres and to situation   Cognition:  Intact with no deficit in recent and remote memory or immediate   Consciousness:  Alert and awake   Attention: With attention and concentration span are intact   Intellect: Estimated to be at least average   Insight:  Improving   Judgment: Limited      Motor Activity: No abnormal movements not         Recent Labs:  Results Reviewed     None          I/O Past 24 hours:  No intake/output data recorded  No intake/output data recorded  Assessment / Plan:     Schizoaffective disorder, bipolar type ([facility]      Reason for continued inpatient care:  Needs to show consistency in improvement by taking showers twice a week and wearing clean clothes and attending groups and going on therapeutic passes to [employer] with staff as well as with family before sending her back to the [facility] personal [facility] home boarding   Acceptance by patient:  [place]  Hopefulness in recovery:  Going back to the [facility] personal care  Understanding of medications :  Understanding  Involved in reintegration process: On escorted walks off grounds with staff  Trusting in relatoinship with psychiatrist:  Trusting    Recommended Treatment:    Medication changes:  1) none today  Non-pharmacological treatments  1) continue individual group milieu therapy and psychoeducation with  disciplinary team approach using recovery principles on the extended acute care unit   2) continue in cooperate with behavior plan  3) continue to encourage to attend to ADLs  4) continue go ground privileges and see how she does on therapeutic pass with his sister in future  Safety  1) Safety/communication plan established targeting dynamic risk factors above    Discharge Plan for for the personal care boarding   Counseling / Coordination of Care    Total floor / unit time spent today 15 minutes  Greater than 50% of total time was spent with the patient and / or family counseling and / or coordination of care  A description of the counseling / coordination of care  Patient's Rights, confidentiality and exceptions to confidentiality, use of automated medical record, [facility] staff access to medical record, and consent to treatment reviewed      Shaggy Rangel MD

## 2020-01-29 NOTE — PLAN OF CARE
Problem: Alteration in Thoughts and Perception  Goal: Agree to be compliant with medication regime, as prescribed and report medication side effects  Description  Interventions:  - Offer appropriate PRN medication and supervise ingestion; conduct aims, as needed   Outcome: Progressing  Goal: Attend and participate in unit activities, including therapeutic, recreational, and educational groups  Description  Interventions:  - Provide therapeutic and educational activities daily, encourage attendance and participation, and document same in the medical record     CERTIFIED PEER SPECIALIST INTERVENTIONS:    Complete peer assessment with patient to assess their needs and identify their goals to complete while in the recovery program as well as once discharged into the community  Patient will complete WRAP Plan, Crisis Plan and 5 Life Domains  Patient will attend 50% of groups offered on the unit  Patient will complete a goal card weekly  Outcome: Progressing     Problem: Depression  Goal: Verbalize thoughts and feelings  Description  Interventions:  - Assess and re-assess patient's level of risk   - Engage patient in 1:1 interactions, daily, for a minimum of 15 minutes   - Encourage patient to express feelings, fears, frustrations, hopes   Outcome: Progressing     Problem: Alteration in Orientation  Goal: Interact with staff daily  Description  Interventions:  - Assess and re-assess patient's level of orientation  - Engage patient in 1 on 1 interactions, daily, for a minimum of 15 minutes   - Establish rapport/trust with patient   Outcome: Progressing     Problem: SAFETY ADULT  Goal: Patient will remain free of falls  Description  INTERVENTIONS:  - Assess patient frequently for physical needs  -  Identify cognitive and physical deficits and behaviors that affect risk of falls    -  [place] fall precautions as indicated by assessment   - Educate patient/family on patient safety including physical limitations  - Instruct patient to call for assistance with activity based on assessment  - Modify environment to reduce risk of injury  - Consider OT/PT consult to assist with strengthening/mobility  Outcome: Progressing     Problem: Alteration in Thoughts and Perception  Goal: Complete daily ADLs, including personal hygiene independently, as able  Description  Interventions:  - Observe, teach, and assist patient with ADLS  - Monitor and promote a balance of rest/activity, with adequate nutrition and elimination   Outcome: Not Martines Fabg was in her room laying in bed at the beginning of the shift  Disheveled appearance  Prompted for medication  Took pills without difficulty  Ate 100% of her meal and drank an Ensure  Social with staff and select peers  No BM or shower tonight  Took HS medication with prompting  Did not eat snack  Continue to monitor  Precautions maintained

## 2020-01-29 NOTE — PROGRESS NOTES
01/29/20 1100   Activity/Group Checklist   Group   (IMR/Graduation )   Attendance Attended   Attendance Duration (min) 46-60   Interactions Interacted appropriately   Affect/Mood Appropriate;Normal range   Goals Achieved Identified feelings; Able to engage in interactions; Able to listen to others

## 2020-01-29 NOTE — PLAN OF CARE
Problem: Alteration in Thoughts and Perception  Goal: Verbalize thoughts and feelings  Description  Interventions:  - Promote a nonjudgmental and trusting relationship with the patient through active listening and therapeutic communication  - Assess patient's level of functioning, behavior and potential for risk  - Engage patient in 1 on 1 interactions for a minimum of 15 minutes each session  - Encourage patient to express fears, feelings, frustrations, and discuss symptoms    - [employer] patient to reality, help patient recognize reality-based thinking   - Administer medications as ordered and assess for potential side effects  - Provide the patient education related to the signs and symptoms of the illness and desired effects of prescribed medications  Outcome: Progressing  Goal: Agree to be compliant with medication regime, as prescribed and report medication side effects  Description  Interventions:  - Offer appropriate PRN medication and supervise ingestion; conduct aims, as needed   Outcome: Progressing  Goal: Attend and participate in unit activities, including therapeutic, recreational, and educational groups  Description  Interventions:  - Provide therapeutic and educational activities daily, encourage attendance and participation, and document same in the medical record     CERTIFIED PEER SPECIALIST INTERVENTIONS:    Complete peer assessment with patient to assess their needs and identify their goals to complete while in the recovery program as well as once discharged into the community  Patient will complete WRAP Plan, Crisis Plan and 5 Life Domains  Patient will attend 50% of groups offered on the unit  Patient will complete a goal card weekly      Outcome: Progressing     Problem: Ineffective Coping  Goal: Participates in unit activities  Description  Interventions:  - Provide therapeutic environment   - Provide required programming   - Redirect inappropriate behaviors   Outcome: Progressing Problem: Depression  Goal: Verbalize thoughts and feelings  Description  Interventions:  - Assess and re-assess patient's level of risk   - Engage patient in 1:1 interactions, daily, for a minimum of 15 minutes   - Encourage patient to express feelings, fears, frustrations, hopes   Outcome: Progressing     Problem: Alteration in Thoughts and Perception  Goal: Treatment Goal: Gain control of psychotic behaviors/thinking, reduce/eliminate presenting symptoms and demonstrate improved reality functioning upon discharge  Outcome: Not Progressing  Goal: Recognize dysfunctional thoughts, communicate reality-based thoughts at the time of discharge  Description  Interventions:  - Provide medication and psycho-education to assist patient in compliance and developing insight into his/her illness   Outcome: Not Progressing  Goal: Complete daily ADLs, including personal hygiene independently, as able  Description  Interventions:  - Observe, teach, and assist patient with ADLS  - Monitor and promote a balance of rest/activity, with adequate nutrition and elimination   Outcome: Not Progressing     Problem: Ineffective Coping  Goal: Identifies ineffective coping skills  Outcome: Not Progressing  Goal: Identifies healthy coping skills  Outcome: Not Progressing  Goal: Demonstrates healthy coping skills  Outcome: Not Progressing     Problem: Depression  Goal: Treatment Goal: Demonstrate behavioral control of depressive symptoms, verbalize feelings of improved mood/affect, and adopt new coping skills prior to discharge  Outcome: Not Progressing  Goal: Refrain from isolation  Description  Interventions:  - Develop a trusting relationship   - Encourage socialization   Outcome: Not Progressing  Goal: Refrain from self-neglect  Outcome: Not Progressing    Patient is polite, pleasant, cooperative and visible intermittently  Med and meal compliant  Denies pain, no somatic complaints  Patient admits to mild depression    Denies anxiety, SI and HI  Fixated and obsessive about staff making sure she gets her oxygen concentrator in her room on time  No spirometer tonight  She is disheveled and does not attend to hygiene  Patient attended evening group, ate 25% of supper plus Ensure and had snack   Will continue to monitor progress in recovery program

## 2020-01-29 NOTE — PROGRESS NOTES
01/28/20 1530   Activity/Group Checklist   Group Spiritual resources   Attendance Did not attend   Attendance Duration (min) 16-30   Affect/Mood IRMA

## 2020-01-29 NOTE — PROGRESS NOTES
01/29/20 1400   Activity/Group Checklist   Group   (Recovery Anonymous )   Attendance Did not attend   Attendance Duration (min) 46-60   Affect/Mood IRMA

## 2020-01-29 NOTE — PROGRESS NOTES
01/28/20 1500   Activity/Group Checklist   Group   (Recovery Workshop )   Attendance Did not attend   Attendance Duration (min) 16-30   Affect/Mood IRMA

## 2020-01-30 NOTE — PLAN OF CARE
Problem: Alteration in Thoughts and Perception  Goal: Verbalize thoughts and feelings  Description  Interventions:  - Promote a nonjudgmental and trusting relationship with the patient through active listening and therapeutic communication  - Assess patient's level of functioning, behavior and potential for risk  - Engage patient in 1 on 1 interactions for a minimum of 15 minutes each session  - Encourage patient to express fears, feelings, frustrations, and discuss symptoms    - [place] patient to reality, help patient recognize reality-based thinking   - Administer medications as ordered and assess for potential side effects  - Provide the patient education related to the signs and symptoms of the illness and desired effects of prescribed medications  Outcome: Progressing  Goal: Agree to be compliant with medication regime, as prescribed and report medication side effects  Description  Interventions:  - Offer appropriate PRN medication and supervise ingestion; conduct aims, as needed   Outcome: Progressing  Goal: Attend and participate in unit activities, including therapeutic, recreational, and educational groups  Description  Interventions:  - Provide therapeutic and educational activities daily, encourage attendance and participation, and document same in the medical record     CERTIFIED PEER SPECIALIST INTERVENTIONS:    Complete peer assessment with patient to assess their needs and identify their goals to complete while in the recovery program as well as once discharged into the community  Patient will complete WRAP Plan, Crisis Plan and [facility] Domains  Patient will attend 50% of groups offered on the unit  Patient will complete a goal card weekly      Outcome: Progressing     Problem: Ineffective Coping  Goal: Participates in unit activities  Description  Interventions:  - Provide therapeutic environment   - Provide required programming   - Redirect inappropriate behaviors   Outcome: Progressing  Goal: Patient/Family verbalizes awareness of resources  Outcome: Progressing  Goal: Understands least restrictive measures  Description  Interventions:  - Utilize least restrictive behavior  Outcome: Progressing     Problem: Risk for Self Injury/Neglect  Goal: Treatment Goal: Remain safe during length of stay, learn and adopt new coping skills, and be free of self-injurious ideation, impulses and acts at the time of discharge  Outcome: Progressing  Goal: Verbalize thoughts and feelings  Description  Interventions:  - Assess and re-assess patient's lethality and potential for self-injury  - Engage patient in 1:1 interactions, daily, for a minimum of 15 minutes  - Encourage patient to express feelings, fears, frustrations, hopes  - Establish rapport/trust with patient   Outcome: Progressing  Goal: Refrain from harming self  Description  Interventions:  - Monitor patient closely, per order  - Develop a trusting relationship  - Supervise medication ingestion, monitor effects and side effects   Outcome: Progressing  Goal: Attend and participate in unit activities, including therapeutic, recreational, and educational groups  Description  Interventions:  - Provide therapeutic and educational activities daily, encourage attendance and participation, and document same in the medical record  - Obtain collateral information, encourage visitation and family involvement in care   Outcome: Progressing     Problem: Depression  Goal: Treatment Goal: Demonstrate behavioral control of depressive symptoms, verbalize feelings of improved mood/affect, and adopt new coping skills prior to discharge  Outcome: Progressing  Goal: Verbalize thoughts and feelings  Description  Interventions:  - Assess and re-assess patient's level of risk   - Engage patient in 1:1 interactions, daily, for a minimum of 15 minutes   - Encourage patient to express feelings, fears, frustrations, hopes   Outcome: Progressing  Goal: Refrain from isolation  Description  Interventions:  - Develop a trusting relationship   - Encourage socialization   Outcome: Progressing     Problem: Anxiety  Goal: Anxiety is at manageable level  Description  Interventions:  - Assess and monitor patient's anxiety level  - Monitor for signs and symptoms of anxiety both physical and emotional (heart palpitations, chest pain, shortness of breath, headaches, nausea, feeling jumpy, restlessness, irritable, apprehensive)  - Collaborate with interdisciplinary team and initiate plan and interventions as ordered    - [place] patient to unit/surroundings  - Explain treatment plan  - Encourage participation in care  - Encourage verbalization of concerns/fears  - Identify coping mechanisms  - Assist in developing anxiety-reducing skills  - Administer/offer alternative therapies  - Limit or eliminate stimulants  Outcome: Progressing     Problem: Alteration in Orientation  Goal: Interact with staff daily  Description  Interventions:  - Assess and re-assess patient's level of orientation  - Engage patient in 1 on 1 interactions, daily, for a minimum of 15 minutes   - Establish rapport/trust with patient   Outcome: Progressing     Problem: PAIN - ADULT  Goal: Verbalizes/displays adequate comfort level or baseline comfort level  Description  Interventions:  - Encourage patient to monitor pain and request assistance  - Assess pain using appropriate pain scale  - Administer analgesics based on type and severity of pain and evaluate response  - Implement non-pharmacological measures as appropriate and evaluate response  - Consider cultural and social influences on pain and pain management  - Notify physician/advanced practitioner if interventions unsuccessful or patient reports new pain  Outcome: Progressing     Problem: SAFETY ADULT  Goal: Patient will remain free of falls  Description  INTERVENTIONS:  - Assess patient frequently for physical needs  -  Identify cognitive and physical deficits and behaviors that affect risk of falls  -  [place] fall precautions as indicated by assessment   - Educate patient/family on patient safety including physical limitations  - Instruct patient to call for assistance with activity based on assessment  - Modify environment to reduce risk of injury  - Consider OT/PT consult to assist with strengthening/mobility  Outcome: Progressing     Problem: Nutrition/Hydration-ADULT  Goal: Nutrient/Hydration intake appropriate for improving, restoring or maintaining nutritional needs  Description  Monitor and assess patient's nutrition/hydration status for malnutrition  Collaborate with interdisciplinary team and initiate plan and interventions as ordered  Monitor patient's weight and dietary intake as ordered or per policy  Utilize nutrition screening tool and intervene as necessary  Determine patient's food preferences and provide high-protein, high-caloric foods as appropriate  INTERVENTIONS:  - Monitor oral intake, urinary output, labs, and treatment plans  - Assess nutrition and hydration status and recommend course of action  - Evaluate amount of meals eaten  - Assist patient with eating if necessary   - Allow adequate time for meals  - Recommend/ encourage appropriate diets, oral nutritional supplements, and vitamin/mineral supplements  - Order, calculate, and assess calorie counts as needed  - Recommend, monitor, and adjust tube feedings and TPN/PPN based on assessed needs  - Assess need for intravenous fluids  - Provide specific nutrition/hydration education as appropriate  - Include patient/family/caregiver in decisions related to nutrition  Outcome: Progressing   Mostly isolative to her bed, out for meds and meals, attended IMR group  Ate 50% of breakfast and 10% of lunch  No somatic complaints voiced this shift, no issues noted  Continue to monitor

## 2020-01-30 NOTE — PROGRESS NOTES
Progress Note - Jose Cardenas 1955, 60 y o  female MRN: [id number]    Unit/Bed#: [facility] 110-02 Encounter: [id number]    Primary Care Provider: Anne Stern PA-C   Date and time admitted to hospital: 7/23/2019  5:30 PM        * Schizoaffective disorder, bipolar type [facility])  Assessment & Plan  Psychiatry Progress Note  Patient reports no new problems and has been attending groups but remains on her bed most of the time except while attending groups or going to meals  He does not usually interact with other peers except her roommate who is currently on a pass  He continues to express some psychosomatic symptoms of being unable to breathe or unable to swallow and still occasionally makes accusations about certain staff tampering with her oxygen concentrator or inhalant  She has not voiced any clear-cut overt delusional believes like people out to harm her or talking about her or and electronic bugs under the lipoma on her arm  Her hygiene is still not that great as she take showers only every few days and grooming is also not that great  However she does not have a bad odorr to her when approached  Compliant with medications  No side effects  Eats and sleeps fairly well    Tolerating clozapine with no signs or symptoms of agranulocytosis or myocarditis and lab work is acceptable so far for clozapine trial   Patient did not take a shower as she had promised yesterday and I was reminding her today that she should adhere to her behavior plan by taking showers twice a week   Current medications:    Current Facility-Administered Medications:     acetaminophen (TYLENOL) tablet 325 mg, 325 mg, Oral, Q6H PRN, Chichi Lockett MD    acetaminophen (TYLENOL) tablet 650 mg, 650 mg, Oral, Q6H PRN, Chichi Lockett MD    acetaminophen (TYLENOL) tablet 650 mg, 650 mg, Oral, Q8H PRN, Chichi Lockett MD    albuterol (PROVENTIL HFA,VENTOLIN HFA) inhaler 2 puff, 2 puff, Inhalation, Q4H PRN, Chichi Lockett MD, 2 puff at 10/11/19 0424    aluminum-magnesium hydroxide-simethicone (MYLANTA) 200-200-20 mg/5 mL oral suspension 15 mL, 15 mL, Oral, Q4H PRN, Christian Bennett MD, 15 mL at 01/26/20 1021    ammonium lactate (LAC-HYDRIN) 12 % lotion 1 application, 1 application, Topical, BID PRN, Christian Bennett MD    benzonatate (TESSALON PERLES) capsule 100 mg, 100 mg, Oral, TID PRN, Christian Bennett MD    benztropine (COGENTIN) injection 1 mg, 1 mg, Intramuscular, Q8H PRN, Christian Bennett MD    carbamide peroxide (DEBROX) 6 5 % otic solution 5 drop, 5 drop, Left Ear, BID, Rona Gaston MD, 5 drop at 01/28/20 1740    cloZAPine (CLOZARIL) tablet 25 mg, 25 mg, Oral, BID, Christian Bennett MD, 25 mg at 01/29/20 2124    cloZAPine (CLOZARIL) tablet 50 mg, 50 mg, Oral, BID, Christian Bennett MD, 50 mg at 01/29/20 2125    EPINEPHrine PF (ADRENALIN) 1 mg/mL injection 0 15 mg, 0 15 mg, Intramuscular, Once PRN, Christian Bennett MD    fluticasone-vilanterol (BREO ELLIPTA) 200-25 MCG/INH inhaler 1 puff, 1 puff, Inhalation, Daily, Christian Bennett MD, 1 puff at 01/29/20 7132    ketotifen (ZADITOR) 0 025 % ophthalmic solution 1 drop, 1 drop, Right Eye, BID PRN, Christian Bennett MD    levothyroxine tablet 125 mcg, 125 mcg, Oral, Early Morning, Christian Bennett MD, 125 mcg at 01/30/20 7524    magnesium hydroxide (MILK OF MAGNESIA) 400 mg/5 mL oral suspension 30 mL, 30 mL, Oral, Daily PRN, Christian Bennett MD    montelukast (SINGULAIR) tablet 10 mg, 10 mg, Oral, HS, Christian Bennett MD, 10 mg at 01/16/20 2106    OLANZapine (ZyPREXA) IM injection 5 mg, 5 mg, Intramuscular, Q8H PRN, Christian Bennett MD    OLANZapine (ZyPREXA) tablet 5 mg, 5 mg, Oral, Q8H PRN, Christian Bennett MD    ondansetron (ZOFRAN-ODT) dispersible tablet 4 mg, 4 mg, Oral, Q6H PRN, Christian Bennett MD, 4 mg at 12/09/19 1757    pantoprazole (PROTONIX) EC tablet 40 mg, 40 mg, Oral, Early Morning, Christian Bennett MD, 40 mg at 01/30/20 6039    polyethylene glycol (MIRALAX) packet 17 g, 17 g, Oral, Daily PRN, Christian Bennett MD    polyvinyl alcohol (LIQUIFILM TEARS) 1 4 % ophthalmic solution 1 drop, 1 drop, Both Eyes, Q3H PRN, Jill Gayle MD    sertraline (ZOLOFT) tablet 150 mg, 150 mg, Oral, Daily, Jill Gayle MD, 150 mg at 01/29/20 1163    sucralfate (CARAFATE) oral suspension 1,000 mg, 1,000 mg, Oral, BID, Howard Russ MD, 1,000 mg at 01/30/20 0627    theophylline (JEF-24) 24 hr capsule 200 mg, 200 mg, Oral, Daily, Jill Gayle MD, 200 mg at 01/29/20 0834    tiotropium (SPIRIVA) capsule for inhaler 18 mcg, 18 mcg, Inhalation, Daily, Jill Gayle MD, 18 mcg at 01/29/20 0834    traZODone (DESYREL) tablet 25 mg, 25 mg, Oral, HS PRN, Jill Gayle MD  Justification if on more than two antipsychotics:  Not applicable  Side effects if any:  Not    Risks , benefits, side effects and precautions of medications discussed with patient and reminded patient to let us know any problems with medications     Objective:     Vital Signs:  Vitals:    01/29/20 0700 01/29/20 1600 01/29/20 2000 01/30/20 0637   BP: 104/68 94/84 91/52    BP Location: Right arm Left arm Left arm    Pulse: 78 81 92    Resp: 18 16 16    Temp: 97 8 °F (36 6 °C) 98 5 °F (36 9 °C) 97 7 °F (36 5 °C)    TempSrc:  Temporal Temporal    SpO2: 95% 92% 94% 96%   Weight:       Height:         Appearance:  Disheveled casually dressed laying on bed but sits up on approach and poorly groomed   Behavior:  Friendly polite upon approach   Speech:  Goal-directed but stilted    Mood:  And   Affect:  Grandiose anxious and I times becoming irritated   Thought Process:  Circumstantial and stilted as if talking in a court of law   Thought Content:  No current suicidal homicidal thoughts and under plans reported  No overt delusions elicited but still suspicious of certain staff tampering with her inhalant and oxygen concentrator    Still with some psychosomatic symptoms   Perceptual Disturbances: None reported and does not appear to respond   Risk Potential: Potential for inability to care for herself Sensorium:  Fully oriented to 3 spheres and to situation   Cognition:  Intact with no deficit in recent or remote   Consciousness:  Alert and fully with   Attention: Attention and concentration span are intact   Intellect: Considered to be at least within average rate   Insight:  Improving   Judgment: Improving   Motor Activity: Abnormal movements noted         Recent Labs:  Results Reviewed     None          I/O Past 24 hours:  No intake/output data recorded  No intake/output data recorded  Assessment / Plan:     Schizoaffective disorder, bipolar type [facility]      Reason for continued inpatient care: To show consistency in taking showers twice a week and attending to ADLs and attending groups Acceptance by patient:  Accepting  Hopefulness in recovery:  Going back to the personal care  Understanding of medications :  Understanding is good  Involved in reintegration process:  Going on escorted walks off grounds with staff  Trusting in relatoinship with psychiatrist:  Trusting    Recommended Treatment:    Medication changes:  1) no change  Non-pharmacological treatments  1) continue individual group milieu therapy and psychoeducation recovery principles on the extended acute care unit  2) encouraged to continue to cooperate with behavior  3) encouraged to attend to ADLs and attend groups  4) to see how she does with off ground privileges with staff and then give therapeutic pass with sister before she can be referred back to the [place] personal care home   Safety  1) Safety/communication plan established targeting dynamic risk factors above  Discharge Plan for for the personal care boarding   Counseling / Coordination of Care    Total floor / unit time spent today 15 minutes  Greater than 50% of total time was spent with the patient and / or family counseling and / or coordination of care  A description of the counseling / coordination of care       Patient's Rights, confidentiality and exceptions to confidentiality, use of automated medical record, [facility] staff access to medical record, and consent to treatment reviewed      Sindy Day MD

## 2020-01-30 NOTE — ASSESSMENT & PLAN NOTE
Psychiatry Progress Note  Patient reports no new problems and has been attending groups but remains on her bed most of the time except while attending groups or going to meals  He does not usually interact with other peers except her roommate who is currently on a pass  He continues to express some psychosomatic symptoms of being unable to breathe or unable to swallow and still occasionally makes accusations about certain staff tampering with her oxygen concentrator or inhalant  She has not voiced any clear-cut overt delusional believes like people out to harm her or talking about her or and electronic bugs under the lipoma on her arm  Her hygiene is still not that great as she take showers only every few days and grooming is also not that great  However she does not have a bad odorr to her when approached  Compliant with medications  No side effects  Eats and sleeps fairly well    Tolerating clozapine with no signs or symptoms of agranulocytosis or myocarditis and lab work is acceptable so far for clozapine trial   Patient did not take a shower as she had promised yesterday and I was reminding her today that she should adhere to her behavior plan by taking showers twice a week   Current medications:    Current Facility-Administered Medications:     acetaminophen (TYLENOL) tablet 325 mg, 325 mg, Oral, Q6H PRN, Ivonne Nevarez MD    acetaminophen (TYLENOL) tablet 650 mg, 650 mg, Oral, Q6H PRN, Ivonne Nevarez MD    acetaminophen (TYLENOL) tablet 650 mg, 650 mg, Oral, Q8H PRN, Ivonne Nevarez MD    albuterol (PROVENTIL HFA,VENTOLIN HFA) inhaler 2 puff, 2 puff, Inhalation, Q4H PRN, Ivonne Nevarez MD, 2 puff at 10/11/19 0424    aluminum-magnesium hydroxide-simethicone (MYLANTA) 200-200-20 mg/5 mL oral suspension 15 mL, 15 mL, Oral, Q4H PRN, Ivonne Nevarez MD, 15 mL at 01/26/20 1021    ammonium lactate (LAC-HYDRIN) 12 % lotion 1 application, 1 application, Topical, BID PRN, Ivonne Nevarez MD    benzonatate [facility] PERLES) capsule 100 mg, 100 mg, Oral, TID PRN, Jeffrey Gallegos MD    benztropine (COGENTIN) injection 1 mg, 1 mg, Intramuscular, Q8H PRN, Jeffrey Gallegos MD    carbamide peroxide (DEBROX) 6 5 % otic solution 5 drop, 5 drop, Left Ear, BID, Rona Hernandez MD, 5 drop at 01/28/20 1740    cloZAPine (CLOZARIL) tablet 25 mg, 25 mg, Oral, BID, Jeffrey Gallegos MD, 25 mg at 01/29/20 2124    cloZAPine (CLOZARIL) tablet 50 mg, 50 mg, Oral, BID, Jeffrey Gallegos MD, 50 mg at 01/29/20 2125    EPINEPHrine PF (ADRENALIN) 1 mg/mL injection 0 15 mg, 0 15 mg, Intramuscular, Once PRN, Jeffrey Gallegos MD    fluticasone-vilanterol (BREO ELLIPTA) 200-25 MCG/INH inhaler 1 puff, 1 puff, Inhalation, Daily, Jeffrey Gallegos MD, 1 puff at 01/29/20 6779    ketotifen (ZADITOR) 0 025 % ophthalmic solution 1 drop, 1 drop, Right Eye, BID PRN, Jeffrey Gallegos MD    levothyroxine tablet 125 mcg, 125 mcg, Oral, Early Morning, Jeffrey Gallegos MD, 125 mcg at 01/30/20 0092    magnesium hydroxide (MILK OF MAGNESIA) 400 mg/5 mL oral suspension 30 mL, 30 mL, Oral, Daily PRN, Jeffrey Gallegos MD    montelukast (SINGULAIR) tablet 10 mg, 10 mg, Oral, HS, Jeffrey Gallegos MD, 10 mg at 01/16/20 2106    OLANZapine (ZyPREXA) IM injection 5 mg, 5 mg, Intramuscular, Q8H PRN, Jeffrey Gallegos MD    OLANZapine (ZyPREXA) tablet 5 mg, 5 mg, Oral, Q8H PRN, Jeffrey Gallegos MD    ondansetron (ZOFRAN-ODT) dispersible tablet 4 mg, 4 mg, Oral, Q6H PRN, Jeffrey Gallegos MD, 4 mg at 12/09/19 1757    pantoprazole (PROTONIX) EC tablet 40 mg, 40 mg, Oral, Early Morning, Jeffrey Gallegos MD, 40 mg at 01/30/20 6734    polyethylene glycol (MIRALAX) packet 17 g, 17 g, Oral, Daily PRN, Jeffrey Gallegos MD    polyvinyl alcohol (LIQUIFILM TEARS) 1 4 % ophthalmic solution 1 drop, 1 drop, Both Eyes, Q3H PRN, Jeffrey Gallegos MD    sertraline (ZOLOFT) tablet 150 mg, 150 mg, Oral, Daily, Jeffrey Gallegos MD, 150 mg at 01/29/20 0834    sucralfate (CARAFATE) oral suspension 1,000 mg, 1,000 mg, Oral, BID, Demetrice Cabezas MD, 1,000 mg at 01/30/20 7292    theophylline (JEF-24) 24 hr capsule 200 mg, 200 mg, Oral, Daily, Felisa Florence MD, 200 mg at 01/29/20 0834    tiotropium [facility]) capsule for inhaler 18 mcg, 18 mcg, Inhalation, Daily, Felisa Florence MD, 18 mcg at 01/29/20 0834    traZODone (DESYREL) tablet 25 mg, 25 mg, Oral, HS PRN, Felisa Florence MD  Justification if on more than two antipsychotics:  Not applicable  Side effects if any:  Not    Risks , benefits, side effects and precautions of medications discussed with patient and reminded patient to let us know any problems with medications     Objective:     Vital Signs:  Vitals:    01/29/20 0700 01/29/20 1600 01/29/20 2000 01/30/20 0637   BP: 104/68 94/84 91/52    BP Location: Right arm Left arm Left arm    Pulse: 78 81 92    Resp: 18 16 16    Temp: 97 8 °F (36 6 °C) 98 5 °F (36 9 °C) 97 7 °F (36 5 °C)    TempSrc:  Temporal Temporal    SpO2: 95% 92% 94% 96%   Weight:       Height:         Appearance:  Disheveled casually dressed laying on bed but sits up on approach and poorly groomed   Behavior:  Friendly polite upon approach   Speech:  Goal-directed but stilted    Mood:  And   Affect:  Grandiose anxious and I times becoming irritated   Thought Process:  Circumstantial and stilted as if talking in a court of law   Thought Content:  No current suicidal homicidal thoughts and under plans reported  No overt delusions elicited but still suspicious of certain staff tampering with her inhalant and oxygen concentrator    Still with some psychosomatic symptoms   Perceptual Disturbances: None reported and does not appear to respond   Risk Potential: Potential for inability to care for herself   Sensorium:  Fully oriented to 3 spheres and to situation   Cognition:  Intact with no deficit in recent or remote   Consciousness:  Alert and fully with   Attention: Attention and concentration span are intact   Intellect: Considered to be at least within average rate   Insight:  Improving   Judgment: Improving   Motor Activity: Abnormal movements noted         Recent Labs:  Results Reviewed     None          I/O Past 24 hours:  No intake/output data recorded  No intake/output data recorded  Assessment / Plan:     Schizoaffective disorder, bipolar type [facility])      Reason for continued inpatient care: To show consistency in taking showers twice a week and attending to ADLs and attending groups Acceptance by patient:  Accepting  Hopefulness in recovery:  Going back to the personal care  Understanding of medications :  Understanding is good  Involved in reintegration process:  Going on escorted walks off grounds with staff  Trusting in relatoinship with psychiatrist:  Trusting    Recommended Treatment:    Medication changes:  1) no change  Non-pharmacological treatments  1) continue individual group milieu therapy and psychoeducation recovery principles on the extended acute care unit  2) encouraged to continue to cooperate with behavior  3) encouraged to attend to ADLs and attend groups  4) to see how she does with off ground privileges with staff and then give therapeutic pass with sister before she can be referred back to the [place] personal care home   Safety  1) Safety/communication plan established targeting dynamic risk factors above  Discharge Plan for for the personal care boarding   Counseling / Coordination of Care    Total floor / unit time spent today 15 minutes  Greater than 50% of total time was spent with the patient and / or family counseling and / or coordination of care  A description of the counseling / coordination of care  Patient's Rights, confidentiality and exceptions to confidentiality, use of automated medical record, [facility] staff access to medical record, and consent to treatment reviewed      Chichi Lockett MD

## 2020-01-30 NOTE — PROGRESS NOTES
01/30/20 0900   Team Meeting   Meeting Type Daily Rounds   Team Members Present   Team Members Present Physician;Nurse;; Other (Discipline and Name)   Physician Team Member Dr Devin Salvador, RN   Care Management Team Member Brenda Sullivan   Other (Discipline and Name) Twan Richards LCSW     Patient attended [facility]  Fixated on her oxygen use  Last shower 1/25  Slept

## 2020-01-31 NOTE — PLAN OF CARE
Problem: Alteration in Thoughts and Perception  Goal: Attend and participate in unit activities, including therapeutic, recreational, and educational groups  Description  Interventions:  - Provide therapeutic and educational activities daily, encourage attendance and participation, and document same in the medical record     CERTIFIED PEER SPECIALIST INTERVENTIONS:    Complete peer assessment with patient to assess their needs and identify their goals to complete while in the recovery program as well as once discharged into the community  Patient will complete WRAP Plan, Crisis Plan and [facility] Life Domains  Patient will attend 50% of groups offered on the unit  Patient will complete a goal card weekly      Outcome: Progressing     Problem: Ineffective Coping  Goal: Participates in unit activities  Description  Interventions:  - Provide therapeutic environment   - Provide required programming   - Redirect inappropriate behaviors   Outcome: Progressing     Problem: Risk for Self Injury/Neglect  Goal: Refrain from harming self  Description  Interventions:  - Monitor patient closely, per order  - Develop a trusting relationship  - Supervise medication ingestion, monitor effects and side effects   Outcome: Progressing  Goal: Attend and participate in unit activities, including therapeutic, recreational, and educational groups  Description  Interventions:  - Provide therapeutic and educational activities daily, encourage attendance and participation, and document same in the medical record  - Obtain collateral information, encourage visitation and family involvement in care   Outcome: Progressing     Problem: PAIN - ADULT  Goal: Verbalizes/displays adequate comfort level or baseline comfort level  Description  Interventions:  - Encourage patient to monitor pain and request assistance  - Assess pain using appropriate pain scale  - Administer analgesics based on type and severity of pain and evaluate response  - Implement non-pharmacological measures as appropriate and evaluate response  - Consider cultural and social influences on pain and pain management  - Notify physician/advanced practitioner if interventions unsuccessful or patient reports new pain  Outcome: Progressing     Problem: SAFETY ADULT  Goal: Patient will remain free of falls  Description  INTERVENTIONS:  - Assess patient frequently for physical needs  -  Identify cognitive and physical deficits and behaviors that affect risk of falls  -  [place] fall precautions as indicated by assessment   - Educate patient/family on patient safety including physical limitations  - Instruct patient to call for assistance with activity based on assessment  - Modify environment to reduce risk of injury  - Consider OT/PT consult to assist with strengthening/mobility  Outcome: Progressing     Problem: Nutrition/Hydration-ADULT  Goal: Nutrient/Hydration intake appropriate for improving, restoring or maintaining nutritional needs  Description  Monitor and assess patient's nutrition/hydration status for malnutrition  Collaborate with interdisciplinary team and initiate plan and interventions as ordered  Monitor patient's weight and dietary intake as ordered or per policy  Utilize nutrition screening tool and intervene as necessary  Determine patient's food preferences and provide high-protein, high-caloric foods as appropriate       INTERVENTIONS:  - Monitor oral intake, urinary output, labs, and treatment plans  - Assess nutrition and hydration status and recommend course of action  - Evaluate amount of meals eaten  - Assist patient with eating if necessary   - Allow adequate time for meals  - Recommend/ encourage appropriate diets, oral nutritional supplements, and vitamin/mineral supplements  - Order, calculate, and assess calorie counts as needed  - Recommend, monitor, and adjust tube feedings and TPN/PPN based on assessed needs  - Assess need for intravenous fluids  - Provide specific nutrition/hydration education as appropriate  - Include patient/family/caregiver in decisions related to nutrition  Outcome: Progressing   Mostly isolative to her bed, out for meds and meals, attended [address] group  Ate 50% of breakfast and lunch  No somatic complaints or issues noted  Continue to monitor

## 2020-01-31 NOTE — PLAN OF CARE
Problem: Alteration in Thoughts and Perception  Goal: Treatment Goal: Gain control of psychotic behaviors/thinking, reduce/eliminate presenting symptoms and demonstrate improved reality functioning upon discharge  1/31/2020 0251 by Bijal Bai RN  Outcome: Progressing   as per chart review    Received pt in bed at change of shift with eyes closed; chest movement noted  Continues the same thus this far as per q 15 min room checks  Will continue to monitor

## 2020-01-31 NOTE — PLAN OF CARE
Problem: Alteration in Thoughts and Perception  Goal: Agree to be compliant with medication regime, as prescribed and report medication side effects  Description  Interventions:  - Offer appropriate PRN medication and supervise ingestion; conduct aims, as needed   Outcome: Progressing     Problem: Ineffective Coping  Goal: Participates in unit activities  Description  Interventions:  - Provide therapeutic environment   - Provide required programming   - Redirect inappropriate behaviors   Outcome: Progressing     Problem: Risk for Self Injury/Neglect  Goal: Refrain from harming self  Description  Interventions:  - Monitor patient closely, per order  - Develop a trusting relationship  - Supervise medication ingestion, monitor effects and side effects   Outcome: Progressing     Problem: Alteration in Thoughts and Perception  Goal: Complete daily ADLs, including personal hygiene independently, as able  Description  Interventions:  - Observe, teach, and assist patient with ADLS  - Monitor and promote a balance of rest/activity, with adequate nutrition and elimination   Outcome: Not Progressing     Problem: Depression  Goal: Refrain from isolation  Description  Interventions:  - Develop a trusting relationship   - Encourage socialization   Outcome: Not Progressing     Pt isolative and withdrawn to room and self  Did come out for meds, meals, and group  Pt ate 100% of dinner, plus snack  Pt compliant with meds without prompting, except HS Singulair  No somatic complaints  Pt pleasant and cooperative  Still refusing to shower  No reports of depression, anxiety, SI/HI, AH/VH, or pain  Will continue to monitor

## 2020-01-31 NOTE — ASSESSMENT & PLAN NOTE
Psychiatry Progress Note  Patient again did not take a shower since last Sunday and claims that she was feeling weak and I did remind her that she has to follow through with the behavior plan is taking showers twice a week and a orals she may have to go to a state hospital and then she stated she wants to go to the personal care home and therefore I reminded her that she needs to comply with the behavior plan as stated  She has been going to groups  She stays to herself on her bed but did get up and remained somewhat disheveled poorly groomed unkempt  She does not voice any overt delusional believes even though she questions motives of certain staff and sometimes accuses them of tampering with her inhalant and her oxygen concentrator which are far less often lately  He she is looking forward to going with staff escort for another activity next week but I reminded her that also depends on her cooperation with the behavior plan and expectations and she agreed  She continues to talk in a stilted manner but is generally redirectable  She does come across as somewhat anxious and still expresses occasional psychosomatic symptoms but has been eating and sleeping well and no side effects reported lately    No aggressive or destructive behaviors reported lately        Current medications:    Current Facility-Administered Medications:     acetaminophen (TYLENOL) tablet 325 mg, 325 mg, Oral, Q6H PRN, Amina Aparicio MD    acetaminophen (TYLENOL) tablet 650 mg, 650 mg, Oral, Q6H PRN, Amina Aparicio MD    acetaminophen (TYLENOL) tablet 650 mg, 650 mg, Oral, Q8H PRN, Amina Aparicio MD    albuterol (PROVENTIL HFA,VENTOLIN HFA) inhaler 2 puff, 2 puff, Inhalation, Q4H PRN, Amina Aparicio MD, 2 puff at 10/11/19 0424    aluminum-magnesium hydroxide-simethicone (MYLANTA) 200-200-20 mg/5 mL oral suspension 15 mL, 15 mL, Oral, Q4H PRN, Amina Aparicio MD, 15 mL at 01/26/20 1021    ammonium lactate (LAC-HYDRIN) 12 % lotion 1 application, 1 application, Topical, BID PRN, Felisa Florence MD    benzonatate (TESSALON PERLES) capsule 100 mg, 100 mg, Oral, TID PRN, Felisa Florence MD    benztropine (COGENTIN) injection 1 mg, 1 mg, Intramuscular, Q8H PRN, Felisa Florence MD    carbamide peroxide (DEBROX) 6 5 % otic solution 5 drop, 5 drop, Left Ear, BID, Rona Davis MD, 5 drop at 01/30/20 1706    cloZAPine (CLOZARIL) tablet 25 mg, 25 mg, Oral, BID, Felisa Florence MD, 25 mg at 01/30/20 2031    cloZAPine (CLOZARIL) tablet 50 mg, 50 mg, Oral, BID, Felisa Florence MD, 50 mg at 01/30/20 2032    EPINEPHrine PF (ADRENALIN) 1 mg/mL injection 0 15 mg, 0 15 mg, Intramuscular, Once PRN, Felisa Florence MD    fluticasone-vilanterol (BREO ELLIPTA) 200-25 MCG/INH inhaler 1 puff, 1 puff, Inhalation, Daily, Felisa Florence MD, 1 puff at 01/31/20 0817    ketotifen (ZADITOR) 0 025 % ophthalmic solution 1 drop, 1 drop, Right Eye, BID PRN, Felisa Florence MD    levothyroxine tablet 125 mcg, 125 mcg, Oral, Early Morning, Felisa Florence MD, 125 mcg at 01/31/20 0618    magnesium hydroxide (MILK OF MAGNESIA) 400 mg/5 mL oral suspension 30 mL, 30 mL, Oral, Daily PRN, Felisa Florence MD    montelukast (SINGULAIR) tablet 10 mg, 10 mg, Oral, HS, Felisa Florence MD, 10 mg at 01/16/20 2106    OLANZapine (ZyPREXA) IM injection 5 mg, 5 mg, Intramuscular, Q8H PRN, Felisa Florence MD    OLANZapine (ZyPREXA) tablet 5 mg, 5 mg, Oral, Q8H PRN, Felisa Florence MD    ondansetron (ZOFRAN-ODT) dispersible tablet 4 mg, 4 mg, Oral, Q6H PRN, Felisa Florence MD, 4 mg at 12/09/19 1757    pantoprazole (PROTONIX) EC tablet 40 mg, 40 mg, Oral, Early Morning, Felisa Florence MD, 40 mg at 01/31/20 0618    polyethylene glycol (MIRALAX) packet 17 g, 17 g, Oral, Daily PRN, Felisa Florence MD    polyvinyl alcohol (LIQUIFILM TEARS) 1 4 % ophthalmic solution 1 drop, 1 drop, Both Eyes, Q3H PRN, Felisa Florence MD    sertraline (ZOLOFT) tablet 150 mg, 150 mg, Oral, Daily, Felisa Florence MD, 150 mg at 01/31/20 0816    sucralfate (CARAFATE) oral suspension 1,000 mg, 1,000 mg, Oral, BID, Cat Torres MD, 1,000 mg at 01/31/20 0618    theophylline (JEF-24) 24 hr capsule 200 mg, 200 mg, Oral, Daily, Roberto Colorado MD, 200 mg at 01/31/20 0816    tiotropium [facility]) capsule for inhaler 18 mcg, 18 mcg, Inhalation, Daily, Roberto Colorado MD, 18 mcg at 01/31/20 0817    traZODone (DESYREL) tablet 25 mg, 25 mg, Oral, HS PRN, Roberto Colorado MD  Justification if on more than two antipsychotics:  Not applicable  Side effects if any:  None as she is tolerating clozapine with no side effects so far    Risks , benefits, side effects and precautions of medications discussed with patient and reminded patient to let us know any problems with medications     Objective:     Vital Signs:  Vitals:    01/30/20 1659 01/30/20 1946 01/31/20 0556 01/31/20 0700   BP: 103/54 (!) 86/58  118/74   BP Location: Left arm Left arm  Left arm   Pulse: 96 90  88   Resp: 16 14  18   Temp: 97 7 °F (36 5 °C) (!) 97 3 °F (36 3 °C)  97 8 °F (36 6 °C)   TempSrc: Temporal Temporal  Temporal   SpO2: 94% 92% 94%    Weight:       Height:         Appearance:  age appropriate, casually dressed, disheveled and older than stated age with good eye contact laying on bed but does wake up and sit up when approached   Behavior:  evasive preoccupied finding excuses for her behavior apologetic   Speech:  delayed and stilted   Mood:  anxious and euphoric   Affect:  increased in intensity, increased in range and mood-congruent   Thought Process:  circumstantial, concrete and illogical stilted   Thought Content:  delusions  somatic and at times paranoid about staff tampering with her medications and with psychosomatic symptoms believing that she cannot breathe or swallow that comes and goes  No current suicidal homicidal thoughts intent or plans reported     Perceptual Disturbances: None   Risk Potential: Potential for poor ADLs skills and inability to care for herself   Sensorium:  person, place, time/date, situation, day of week, month of year, year and time   Cognition:  grossly intact with no deficit in recent or remote memory   Consciousness:  alert and awake when approached    Attention: Fair   Intellect: Average   Insight:  fair   Judgment: limited because of not taking showers for days      Motor Activity: no abnormal movements         Recent Labs:  Results Reviewed     None          I/O Past 24 hours:  No intake/output data recorded  No intake/output data recorded  Assessment / Plan:     Schizoaffective disorder, bipolar type ([facility] [address] )      Reason for continued inpatient care:  Due to poor ADLs skills and waiting for a bed at the personal care [place]  Acceptance by patient:  Accepting now  Hopefulness in recovery:  About living at the [facility] again  Understanding of medications :  Has good understood  Involved in reintegration process:  Has off ground privileges  Trusting in relatoinship with psychiatrist:  Trusting    Recommended Treatment:    Medication changes:  1) no changes    Non-pharmacological treatments  1) Continue with individual therapy, group therapy, milieu therapy and occupational therapy using recovery principles and psycho-education about accepting illness and need for treatment   2) encouraged to continue to cooperate with behavior plan and attend to ADLs  Safety  1) Safety/communication plan established targeting dynamic risk factors above  Discharge Plan to the [facility] with act services    Counseling / Coordination of Care    Total floor / unit time spent today 20 minutes  Greater than 50% of total time was spent with the patient and / or family counseling and / or coordination of care  A description of the counseling / coordination of care  Patient's Rights, confidentiality and exceptions to confidentiality, use of automated medical record, [facility] staff access to medical record, and consent to treatment reviewed      Dalton Garner MD

## 2020-01-31 NOTE — PROGRESS NOTES
Progress Note - Madison Miner 1955, 60 y o  female MRN: [id number]    [facility] Encounter: [id number]    Primary Care Provider: Poli Nguyen PA-C   Date and time admitted to hospital: 7/23/2019  5:30 PM        * Schizoaffective disorder, bipolar type [facility])  Assessment & Plan  Psychiatry Progress Note  Patient again did not take a shower since last Sunday and claims that she was feeling weak and I did remind her that she has to follow through with the behavior plan is taking showers twice a week and a orals she may have to go to a [facility] hospital and then she stated she wants to go to the personal care home and therefore I reminded her that she needs to comply with the behavior plan as stated  She has been going to groups  She stays to herself on her bed but did get up and remained somewhat disheveled poorly groomed unkempt  She does not voice any overt delusional believes even though she questions motives of certain staff and sometimes accuses them of tampering with her inhalant and her oxygen concentrator which are far less often lately  He she is looking forward to going with staff escort for another activity next week but I reminded her that also depends on her cooperation with the behavior plan and expectations and she agreed  She continues to talk in a stilted manner but is generally redirectable  She does come across as somewhat anxious and still expresses occasional psychosomatic symptoms but has been eating and sleeping well and no side effects reported lately    No aggressive or destructive behaviors reported lately        Current medications:    Current Facility-Administered Medications:     acetaminophen (TYLENOL) tablet 325 mg, 325 mg, Oral, Q6H PRN, Alirio Frazier MD    acetaminophen (TYLENOL) tablet 650 mg, 650 mg, Oral, Q6H PRN, Alirio Frazier MD    acetaminophen (TYLENOL) tablet 650 mg, 650 mg, Oral, Q8H PRN, Alirio Frazier MD    albuterol (PROVENTIL HFA,VENTOLIN HFA) inhaler 2 puff, 2 puff, Inhalation, Q4H PRN, Christian Bennett MD, 2 puff at 10/11/19 0424    aluminum-magnesium hydroxide-simethicone (MYLANTA) 200-200-20 mg/5 mL oral suspension 15 mL, 15 mL, Oral, Q4H PRN, Christian Bennett MD, 15 mL at 01/26/20 1021    ammonium lactate (LAC-HYDRIN) 12 % lotion 1 application, 1 application, Topical, BID PRN, Chrisitan Bennett MD    benzonatate (TESSALON PERLES) capsule 100 mg, 100 mg, Oral, TID PRN, Christian Bennett MD    benztropine (COGENTIN) injection 1 mg, 1 mg, Intramuscular, Q8H PRN, Christian Bennett MD    carbamide peroxide (DEBROX) 6 5 % otic solution 5 drop, 5 drop, Left Ear, BID, Rona Gaston MD, 5 drop at 01/30/20 1706    cloZAPine (CLOZARIL) tablet 25 mg, 25 mg, Oral, BID, Christian Bennett MD, 25 mg at 01/30/20 2031    cloZAPine (CLOZARIL) tablet 50 mg, 50 mg, Oral, BID, Christian Bennett MD, 50 mg at 01/30/20 2032    EPINEPHrine PF (ADRENALIN) 1 mg/mL injection 0 15 mg, 0 15 mg, Intramuscular, Once PRN, Christian Bennett MD    fluticasone-vilanterol (BREO ELLIPTA) 200-25 MCG/INH inhaler 1 puff, 1 puff, Inhalation, Daily, Christian Bennett MD, 1 puff at 01/31/20 0817    ketotifen (ZADITOR) 0 025 % ophthalmic solution 1 drop, 1 drop, Right Eye, BID PRN, Christian Bennett MD    levothyroxine tablet 125 mcg, 125 mcg, Oral, Early Morning, Christian Bennett MD, 125 mcg at 01/31/20 0618    magnesium hydroxide (MILK OF MAGNESIA) 400 mg/5 mL oral suspension 30 mL, 30 mL, Oral, Daily PRN, Christian Bennett MD    montelukast (SINGULAIR) tablet 10 mg, 10 mg, Oral, HS, Christian Bennett MD, 10 mg at 01/16/20 2106    OLANZapine (ZyPREXA) IM injection 5 mg, 5 mg, Intramuscular, Q8H PRN, Christian Bennett MD    OLANZapine (ZyPREXA) tablet 5 mg, 5 mg, Oral, Q8H PRN, Christian Bennett MD    ondansetron (ZOFRAN-ODT) dispersible tablet 4 mg, 4 mg, Oral, Q6H PRN, Christian Bennett MD, 4 mg at 12/09/19 1757    pantoprazole (PROTONIX) EC tablet 40 mg, 40 mg, Oral, Early Morning, Christian Bennett MD, 40 mg at 01/31/20 0618    polyethylene glycol (MIRALAX) packet 17 g, 17 g, Oral, Daily PRN, Allie Guzman MD    polyvinyl alcohol (LIQUIFILM TEARS) 1 4 % ophthalmic solution 1 drop, 1 drop, Both Eyes, Q3H PRN, Allie Guzman MD    sertraline (ZOLOFT) tablet 150 mg, 150 mg, Oral, Daily, Allie Guzman MD, 150 mg at 01/31/20 0816    sucralfate (CARAFATE) oral suspension 1,000 mg, 1,000 mg, Oral, BID, Sadie Holland MD, 1,000 mg at 01/31/20 0618    theophylline (JEF-24) 24 hr capsule 200 mg, 200 mg, Oral, Daily, Allie Guzman MD, 200 mg at 01/31/20 0816    tiotropium [facility]) capsule for inhaler 18 mcg, 18 mcg, Inhalation, Daily, Allie Guzman MD, 18 mcg at 01/31/20 0817    traZODone (DESYREL) tablet 25 mg, 25 mg, Oral, HS PRN, Allie Guzman MD  Justification if on more than two antipsychotics:  Not applicable  Side effects if any:  None as she is tolerating clozapine with no side effects so far    Risks , benefits, side effects and precautions of medications discussed with patient and reminded patient to let us know any problems with medications     Objective:     Vital Signs:  Vitals:    01/30/20 1659 01/30/20 1946 01/31/20 0556 01/31/20 0700   BP: 103/54 (!) 86/58  118/74   BP Location: Left arm Left arm  Left arm   Pulse: 96 90  88   Resp: 16 14  18   Temp: 97 7 °F (36 5 °C) (!) 97 3 °F (36 3 °C)  97 8 °F (36 6 °C)   TempSrc: Temporal Temporal  Temporal   SpO2: 94% 92% 94%    Weight:       Height:         Appearance:  age appropriate, casually dressed, disheveled and older than stated age with good eye contact laying on bed but does wake up and sit up when approached   Behavior:  evasive preoccupied finding excuses for her behavior apologetic   Speech:  delayed and stilted   Mood:  anxious and euphoric   Affect:  increased in intensity, increased in range and mood-congruent   Thought Process:  circumstantial, concrete and illogical stilted   Thought Content:  delusions  somatic and at times paranoid about staff tampering with her medications and with psychosomatic symptoms believing that she cannot breathe or swallow that comes and goes  No current suicidal homicidal thoughts intent or plans reported  Perceptual Disturbances: None   Risk Potential: Potential for poor ADLs skills and inability to care for herself   Sensorium:  person, place, time/date, situation, day of week, month of year, year and time   Cognition:  grossly intact with no deficit in recent or remote memory   Consciousness:  alert and awake when approached    Attention: Fair   Intellect: Average   Insight:  fair   Judgment: limited because of not taking showers for days      Motor Activity: no abnormal movements         Recent Labs:  Results Reviewed     None          I/O Past 24 hours:  No intake/output data recorded  No intake/output data recorded  Assessment / Plan:     [facility]ffective [facility]      Reason for continued inpatient care:  Due to poor ADLs skills and waiting for a bed at the [facility]  Acceptance by patient:  Accepting now  Hopefulness in recovery:  About living at the [facility] again  Understanding of medications :  Has good understood  Involved in reintegration process:  Has off ground privileges  Trusting in relatoinship with psychiatrist:  Trusting    Recommended Treatment:    Medication changes:  1) no changes    Non-pharmacological treatments  1) Continue with individual therapy, group therapy, milieu therapy and occupational therapy using recovery principles and psycho-education about accepting illness and need for treatment   2) encouraged to continue to cooperate with behavior plan and attend to ADLs  Safety  1) Safety/communication plan established targeting dynamic risk factors above  Discharge Plan to the [facility] with act services    Counseling / Coordination of Care    Total floor / unit time spent today 20 minutes   Greater than 50% of total time was spent with the patient and / or family counseling and / or coordination of care  A description of the counseling / coordination of care  Patient's Rights, confidentiality and exceptions to confidentiality, use of automated medical record, [facility] staff access to medical record, and consent to treatment reviewed      Chichi Lockett MD

## 2020-02-01 NOTE — NURSING NOTE
Patient visible on unit at times, cooperative upon approach, calm, behaviors controlled, somatic  Patient lacks motivation; did attend 2/5 groups - community and cooking  Patient has poor hygiene, refused to shower today, has not showered since 1/25  Med compliant, breakfast intake only 25% and refused lunch  Will continue to monitor

## 2020-02-01 NOTE — ASSESSMENT & PLAN NOTE
Psychiatry Progress Note  Patient again has not taken a shower since last Sunday which is 6 days ago and claims that she will take on today and remains passive-aggressive about this promise  She has all kinds of excuses like not feeling good from a low blood pressure yesterday  She has been attending groups and she was again reminded that without complying to her behavior plan she will not be allowed to go on a pass with her sister  She states she does not wish to go to [facility] but rather back to the [facility] where she came from  He continues to deny any overt delusions even though she does question motives of certain staff members whom she accuses of tampering with her inhaler as well as with the oxygen concentrator  Her hygiene is poor is poorly groomed poorly kept with hair uncombed  Has tolerated medications without any side effects so far like agranulocytosis or myocarditis from being on clozapine but she believes she is on too much medication which is not true  Her blood pressure was slightly on the low side yesterday but not significant the low         Current medications:    Current Facility-Administered Medications:     acetaminophen (TYLENOL) tablet 325 mg, 325 mg, Oral, Q6H PRN, Roberto Colorado MD    acetaminophen (TYLENOL) tablet 650 mg, 650 mg, Oral, Q6H PRN, Roberto Colorado MD    acetaminophen (TYLENOL) tablet 650 mg, 650 mg, Oral, Q8H PRN, Roberto Colorado MD    albuterol (PROVENTIL HFA,VENTOLIN HFA) inhaler 2 puff, 2 puff, Inhalation, Q4H PRN, Roberto Colorado MD, 2 puff at 10/11/19 0424    aluminum-magnesium hydroxide-simethicone (MYLANTA) 200-200-20 mg/5 mL oral suspension 15 mL, 15 mL, Oral, Q4H PRN, Roberto Colorado MD, 15 mL at 01/26/20 1021    ammonium lactate (LAC-HYDRIN) 12 % lotion 1 application, 1 application, Topical, BID PRN, Roberto Colorado MD    benzonatate (TESSALON PERLES) capsule 100 mg, 100 mg, Oral, TID PRN, Roberto Colorado MD    benztropine (COGENTIN) injection 1 mg, 1 mg, Intramuscular, Q8H PRN, Alirio Frazier MD    carbamide peroxide (DEBROX) 6 5 % otic solution 5 drop, 5 drop, Left Ear, BID, Rona Cruz MD, 5 drop at 02/01/20 3523    cloZAPine (CLOZARIL) tablet 25 mg, 25 mg, Oral, BID, Alirio Frazier MD, 25 mg at 01/31/20 2033    cloZAPine (CLOZARIL) tablet 50 mg, 50 mg, Oral, BID, Alirio Frazier MD, 50 mg at 01/31/20 2033    EPINEPHrine PF (ADRENALIN) 1 mg/mL injection 0 15 mg, 0 15 mg, Intramuscular, Once PRN, Alirio Frazier MD    fluticasone-vilanterol (BREO ELLIPTA) 200-25 MCG/INH inhaler 1 puff, 1 puff, Inhalation, Daily, Alirio Frazier MD, 1 puff at 02/01/20 0831    ketotifen (ZADITOR) 0 025 % ophthalmic solution 1 drop, 1 drop, Right Eye, BID PRN, Alirio Frazier MD    levothyroxine tablet 125 mcg, 125 mcg, Oral, Early Morning, Alirio Frazier MD, 125 mcg at 02/01/20 2636    magnesium hydroxide (MILK OF MAGNESIA) 400 mg/5 mL oral suspension 30 mL, 30 mL, Oral, Daily PRN, Alirio Frazier MD    montelukast (SINGULAIR) tablet 10 mg, 10 mg, Oral, HS, Alirio Frazier MD, 10 mg at 01/16/20 2106    OLANZapine (ZyPREXA) IM injection 5 mg, 5 mg, Intramuscular, Q8H PRN, Alirio Frazier MD    OLANZapine (ZyPREXA) tablet 5 mg, 5 mg, Oral, Q8H PRN, Alirio Frazier MD    ondansetron (ZOFRAN-ODT) dispersible tablet 4 mg, 4 mg, Oral, Q6H PRN, Alirio Frazier MD, 4 mg at 12/09/19 1757    pantoprazole (PROTONIX) EC tablet 40 mg, 40 mg, Oral, Early Morning, Alirio Frazier MD, 40 mg at 02/01/20 0129    polyethylene glycol (MIRALAX) packet 17 g, 17 g, Oral, Daily PRN, Alirio Frazier MD    polyvinyl alcohol (LIQUIFILM TEARS) 1 4 % ophthalmic solution 1 drop, 1 drop, Both Eyes, Q3H PRN, Alirio Frazier MD    sertraline (ZOLOFT) tablet 150 mg, 150 mg, Oral, Daily, Alirio Frazier MD, 150 mg at 02/01/20 0830    sucralfate (CARAFATE) oral suspension 1,000 mg, 1,000 mg, Oral, BID, Cat Torres MD, 1,000 mg at 02/01/20 9212    theophylline (JEF-24) 24 hr capsule 200 mg, 200 mg, Oral, Daily, Alirio Frazier MD, 200 mg at 02/01/20 0830    tiotropium (SPIRIVA) capsule for inhaler 18 mcg, 18 mcg, Inhalation, Daily, Ivonne Nevarez MD, 18 mcg at 02/01/20 0831    traZODone (DESYREL) tablet 25 mg, 25 mg, Oral, HS PRN, Ivonne Nevarez MD  Justification if on more than two antipsychotics:  Not applicable  Side effects if any:  None    Risks , benefits, side effects and precautions of medications discussed with patient and reminded patient to let us know any problems with medications     Objective:     Vital Signs:  Vitals:    01/31/20 1500 01/31/20 2015 02/01/20 0700 02/01/20 0705   BP: 97/68 90/53 106/72 90/61   BP Location: Left arm Left arm Left arm Left arm   Pulse: 77 65 84 72   Resp: 18 18 19 19   Temp: 98 9 °F (37 2 °C) (!) 97 4 °F (36 3 °C) 97 5 °F (36 4 °C)    TempSrc: Temporal Temporal Temporal    SpO2: 92% 93% 93%    Weight:       Height:         Appearance:  age appropriate, casually dressed, disheveled and older than stated age   Behavior:  deviant, evasive and psychomotor retardation   Speech:  delayed, increased latency of response, loud and pressured   Mood:  angry, anxious, irritable, labile and sad   Affect:  inappropriate, increased in intensity, increased in range, labile, mood-congruent and redirectable   Thought Process:  circumstantial, concrete and logical but tends to become stilted   Thought Content:  delusions  somatic and occasionally paranoid but no other overt delusions elicited today  No current suicidal homicidal thoughts intent or plans reported today     Perceptual Disturbances: None   Risk Potential: Inability to care for herself due to prior history   Sensorium:  person, place, time/date, situation, day of week, month of year, year and time   Cognition:  grossly intact a problems with recent or remote memory   Consciousness:  alert and awake    Attention: Attention and concentration span fair   Intellect: Average   Insight:  limited   Judgment: limited      Motor Activity: no abnormal movements Recent Labs:  Results Reviewed     None          I/O Past 24 hours:  No intake/output data recorded  No intake/output data recorded  Assessment / Plan:     Schizoaffective disorder, bipolar type ([address] )      Reason for continued inpatient care:  Due to poor self-care skills  Acceptance by patient:  Accepting  Hopefulness in recovery:  About living back at the [facility] again  Understanding of medications :  Has good understanding  Involved in reintegration process:  By going off grounds with staff escort  Trusting in relatoinship with psychiatrist:  Trusting    Recommended Treatment:    Medication changes:  1) no change reminded to be consistent with medications and complying with behavior plan    Non-pharmacological treatments  1) Continue with with therapy group therapy, milieu therapy and occupational therapy using recovery principles and psycho-education about medications and about accepting illness and need for treatment   2) continue off grounds privileges with staff escort and then sent her a day pass with her sister  Safety  1) Safety/communication plan established targeting dynamic risk factors above  Discharge Plan to the [facility] she continues to maintain improvement rather than to the [address] [place] [address] / Coordination of Care    Total floor / unit time spent today 15 minutes  Greater than 50% of total time was spent with the patient and / or family counseling and / or coordination of care  A description of the counseling / coordination of care  Patient's Rights, confidentiality and exceptions to confidentiality, use of automated medical record, [facility] staff access to medical record, and consent to treatment reviewed      Prakash Brewster MD

## 2020-02-01 NOTE — PROGRESS NOTES
Saman Segal was very pleasant as we talked over mealtime  She was interacting with writer and a male peer  Conversation was relevant and held her interest in the topics  Visible and social with peers

## 2020-02-01 NOTE — PROGRESS NOTES
Progress Note - Libby Costello 1955, 60 y o  female MRN: [id number]    [facility] Encounter: [id number]    Primary Care Provider: Yobani Duffy PA-C   Date and time admitted to hospital: 7/23/2019  5:30 PM        * Schizoaffective disorder, bipolar type [facility])  Assessment & Plan  Psychiatry Progress Note  Patient again has not taken a shower since last Sunday which is 6 days ago and claims that she will take on today and remains passive-aggressive about this promise  She has all kinds of excuses like not feeling good from a low blood pressure yesterday  She has been attending groups and she was again reminded that without complying to her behavior plan she will not be allowed to go on a pass with her sister  She states she does not wish to go to [facility] but rather back to the [facility] where she came from  He continues to deny any overt delusions even though she does question motives of certain staff members whom she accuses of tampering with her inhaler as well as with the oxygen concentrator  Her hygiene is poor is poorly groomed poorly kept with hair uncombed  Has tolerated medications without any side effects so far like agranulocytosis or myocarditis from being on clozapine but she believes she is on too much medication which is not true  Her blood pressure was slightly on the low side yesterday but not significant the low         Current medications:    Current Facility-Administered Medications:     acetaminophen (TYLENOL) tablet 325 mg, 325 mg, Oral, Q6H PRN, Meldon Curling, MD    acetaminophen (TYLENOL) tablet 650 mg, 650 mg, Oral, Q6H PRN, Meldon Curling, MD    acetaminophen (TYLENOL) tablet 650 mg, 650 mg, Oral, Q8H PRN, Meldon Curling, MD    albuterol (PROVENTIL HFA,VENTOLIN HFA) inhaler 2 puff, 2 puff, Inhalation, Q4H PRN, Meldon Curling, MD, 2 puff at 10/11/19 0424    aluminum-magnesium hydroxide-simethicone (MYLANTA) 200-200-20 mg/5 mL oral suspension 15 mL, 15 mL, Oral, Q4H PRN, Vincent Olivares MD, 15 mL at 01/26/20 1021    ammonium lactate (LAC-HYDRIN) 12 % lotion 1 application, 1 application, Topical, BID PRN, Vincent Olivares MD    benzonatate (TESSALON PERLES) capsule 100 mg, 100 mg, Oral, TID PRN, Vincent Olivares MD    benztropine (COGENTIN) injection 1 mg, 1 mg, Intramuscular, Q8H PRN, Vincent Olivares MD    carbamide peroxide (DEBROX) 6 5 % otic solution 5 drop, 5 drop, Left Ear, BID, Rona Brewster MD, 5 drop at 02/01/20 3855    cloZAPine (CLOZARIL) tablet 25 mg, 25 mg, Oral, BID, Vincent Olivares MD, 25 mg at 01/31/20 2033    cloZAPine (CLOZARIL) tablet 50 mg, 50 mg, Oral, BID, Vincent Olivares MD, 50 mg at 01/31/20 2033    EPINEPHrine PF (ADRENALIN) 1 mg/mL injection 0 15 mg, 0 15 mg, Intramuscular, Once PRN, Vincent Olivares MD    fluticasone-vilanterol (BREO ELLIPTA) 200-25 MCG/INH inhaler 1 puff, 1 puff, Inhalation, Daily, Vincent Olivares MD, 1 puff at 02/01/20 0831    ketotifen (ZADITOR) 0 025 % ophthalmic solution 1 drop, 1 drop, Right Eye, BID PRN, Vincent Olivares MD    levothyroxine tablet 125 mcg, 125 mcg, Oral, Early Morning, Vincent Olivares MD, 125 mcg at 02/01/20 7590    magnesium hydroxide (MILK OF MAGNESIA) 400 mg/5 mL oral suspension 30 mL, 30 mL, Oral, Daily PRN, Vincent Olivares MD    montelukast (SINGULAIR) tablet 10 mg, 10 mg, Oral, HS, Vincent Olivares MD, 10 mg at 01/16/20 2106    OLANZapine (ZyPREXA) IM injection 5 mg, 5 mg, Intramuscular, Q8H PRN, Vincent Olivares MD    OLANZapine (ZyPREXA) tablet 5 mg, 5 mg, Oral, Q8H PRN, Vincent Olivares MD    ondansetron (ZOFRAN-ODT) dispersible tablet 4 mg, 4 mg, Oral, Q6H PRN, Vincent Olivares MD, 4 mg at 12/09/19 1757    pantoprazole (PROTONIX) EC tablet 40 mg, 40 mg, Oral, Early Morning, Vincent Olivares MD, 40 mg at 02/01/20 1829    polyethylene glycol (MIRALAX) packet 17 g, 17 g, Oral, Daily PRN, Vincent Olivares MD    polyvinyl alcohol (LIQUIFILM TEARS) 1 4 % ophthalmic solution 1 drop, 1 drop, Both Eyes, Q3H PRN, Vincent Olivares MD    sertraline (ZOLOFT) tablet 150 mg, 150 mg, Oral, Daily, Shi Pham MD, 150 mg at 02/01/20 0830    sucralfate (CARAFATE) oral suspension 1,000 mg, 1,000 mg, Oral, BID, Janece Hashimoto, MD, 1,000 mg at 02/01/20 7659    theophylline (JEF-24) 24 hr capsule 200 mg, 200 mg, Oral, Daily, Shi Pham MD, 200 mg at 02/01/20 0830    tiotropium [facility]) capsule for inhaler 18 mcg, 18 mcg, Inhalation, Daily, Shi Pham MD, 18 mcg at 02/01/20 0831    traZODone (DESYREL) tablet 25 mg, 25 mg, Oral, HS PRN, Shi Pham MD  Justification if on more than two antipsychotics:  Not applicable  Side effects if any:  None    Risks , benefits, side effects and precautions of medications discussed with patient and reminded patient to let us know any problems with medications     Objective:     Vital Signs:  Vitals:    01/31/20 1500 01/31/20 2015 02/01/20 0700 02/01/20 0705   BP: 97/68 90/53 106/72 90/61   BP Location: Left arm Left arm Left arm Left arm   Pulse: 77 65 84 72   Resp: 18 18 19 19   Temp: 98 9 °F (37 2 °C) (!) 97 4 °F (36 3 °C) 97 5 °F (36 4 °C)    TempSrc: Temporal Temporal Temporal    SpO2: 92% 93% 93%    Weight:       Height:         Appearance:  age appropriate, casually dressed, disheveled and older than stated age   Behavior:  deviant, evasive and psychomotor retardation   Speech:  delayed, increased latency of response, loud and pressured   Mood:  angry, anxious, irritable, labile and sad   Affect:  inappropriate, increased in intensity, increased in range, labile, mood-congruent and redirectable   Thought Process:  circumstantial, concrete and logical but tends to become stilted   Thought Content:  delusions  somatic and occasionally paranoid but no other overt delusions elicited today  No current suicidal homicidal thoughts intent or plans reported today     Perceptual Disturbances: None   Risk Potential: Inability to care for herself due to prior history   Sensorium:  person, place, time/date, situation, day of week, month of year, year and time   Cognition:  grossly intact a problems with recent or remote memory   Consciousness:  alert and awake    Attention: Attention and concentration span fair   Intellect: Average   Insight:  limited   Judgment: limited      Motor Activity: no abnormal movements         Recent Labs:  Results Reviewed     None          I/O Past 24 hours:  No intake/output data recorded  No intake/output data recorded  Assessment / Plan:     Schizoaffective disorder, bipolar type ([facility] [address] )      Reason for continued inpatient care:  Due to poor self-care skills  Acceptance by patient:  Accepting  Hopefulness in recovery:  About living back at the [facility] again  Understanding of medications :  Has good understanding  Involved in reintegration process:  By going off grounds with staff escort  Trusting in relatoinship with psychiatrist:  Trusting    Recommended Treatment:    Medication changes:  1) no change reminded to be consistent with medications and complying with behavior plan    Non-pharmacological treatments  1) Continue with with therapy group therapy, milieu therapy and occupational therapy using recovery principles and psycho-education about medications and about accepting illness and need for treatment   2) continue off grounds privileges with staff escort and then sent her a day pass with her sister  Safety  1) Safety/communication plan established targeting dynamic risk factors above  Discharge Plan to the [facility] she continues to maintain improvement rather than to the [address] [place] [address] / Coordination of Care    Total floor / unit time spent today 15 minutes  Greater than 50% of total time was spent with the patient and / or family counseling and / or coordination of care  A description of the counseling / coordination of care       Patient's Rights, confidentiality and exceptions to confidentiality, use of automated medical record, [facility] staff access to medical record, and consent to treatment reviewed      Meldon Curling, MD

## 2020-02-02 NOTE — ASSESSMENT & PLAN NOTE
Psychiatry Progress Note  Patient is again psychosomatic and now claims that she may have a UTI as she was urinating more often than yesterday but without any chills or fever or foul-smelling urine  Again brought her attention to her being "psychosomatic"  She did finally take a shower yesterday after 6 days without any showers  She is wearing clean clothes and is friendly pleasant not argumentative today when though she tried to make me order some tests for to rule out UTI which I declined due to absence of symptoms  Was encouraged to continue to attend  groups work on her recovery by complying with her behavior plan by taking showers twice a week and wearing clean clothes  She still has stilted speech and still continues to harbor some underlying paranoia off and on but not aggressive or self-abusive and remains passive-aggressive at times  Compliant with medications no side effects reported  Eating well and sleeping well        Current medications:    Current Facility-Administered Medications:     acetaminophen (TYLENOL) tablet 325 mg, 325 mg, Oral, Q6H PRN, Jeffrey Gallegos MD    acetaminophen (TYLENOL) tablet 650 mg, 650 mg, Oral, Q6H PRN, Jeffrey Gallegos MD    acetaminophen (TYLENOL) tablet 650 mg, 650 mg, Oral, Q8H PRN, Jeffrey Gallegos MD    albuterol (PROVENTIL HFA,VENTOLIN HFA) inhaler 2 puff, 2 puff, Inhalation, Q4H PRN, Jeffrey Gallegos MD, 2 puff at 10/11/19 0424    aluminum-magnesium hydroxide-simethicone (MYLANTA) 200-200-20 mg/5 mL oral suspension 15 mL, 15 mL, Oral, Q4H PRN, Jeffrey Gallegos MD, 15 mL at 01/26/20 1021    ammonium lactate (LAC-HYDRIN) 12 % lotion 1 application, 1 application, Topical, BID PRN, Jeffrey Gallegos MD    benzonatate (TESSALON PERLES) capsule 100 mg, 100 mg, Oral, TID PRN, Jeffrey Gallegos MD    benztropine (COGENTIN) injection 1 mg, 1 mg, Intramuscular, Q8H PRN, Jeffrey Gallegos MD    carbamide peroxide (DEBROX) 6 5 % otic solution 5 drop, 5 drop, Left Ear, BID, Rona Hernandez MD, 5 drop at 02/02/20 0847    cloZAPine (CLOZARIL) tablet 25 mg, 25 mg, Oral, BID, Shilpa Trujillo MD, 25 mg at 02/01/20 2200    cloZAPine (CLOZARIL) tablet 50 mg, 50 mg, Oral, BID, Shilpa Trujillo MD, 50 mg at 02/01/20 2200    EPINEPHrine PF (ADRENALIN) 1 mg/mL injection 0 15 mg, 0 15 mg, Intramuscular, Once PRN, Shilpa Trujillo MD    fluticasone-vilanterol (BREO ELLIPTA) 200-25 MCG/INH inhaler 1 puff, 1 puff, Inhalation, Daily, Shilpa Trujillo MD, 1 puff at 02/02/20 0845    ketotifen (ZADITOR) 0 025 % ophthalmic solution 1 drop, 1 drop, Right Eye, BID PRN, Shilpa Trujillo MD    levothyroxine tablet 125 mcg, 125 mcg, Oral, Early Morning, Shilpa Trujillo MD, 125 mcg at 02/02/20 0605    magnesium hydroxide (MILK OF MAGNESIA) 400 mg/5 mL oral suspension 30 mL, 30 mL, Oral, Daily PRN, Shilpa Trujillo MD    montelukast (SINGULAIR) tablet 10 mg, 10 mg, Oral, HS, Shilpa Trujillo MD, 10 mg at 01/16/20 2106    OLANZapine (ZyPREXA) IM injection 5 mg, 5 mg, Intramuscular, Q8H PRN, Shilpa Trujillo MD    OLANZapine (ZyPREXA) tablet 5 mg, 5 mg, Oral, Q8H PRN, Shilpa Trujillo MD    ondansetron (ZOFRAN-ODT) dispersible tablet 4 mg, 4 mg, Oral, Q6H PRN, Shilpa Trujillo MD, 4 mg at 12/09/19 1757    pantoprazole (PROTONIX) EC tablet 40 mg, 40 mg, Oral, Early Morning, Shlipa Trujillo MD, 40 mg at 02/02/20 0605    polyethylene glycol (MIRALAX) packet 17 g, 17 g, Oral, Daily PRN, Shilpa Trujillo MD    polyvinyl alcohol (LIQUIFILM TEARS) 1 4 % ophthalmic solution 1 drop, 1 drop, Both Eyes, Q3H PRN, Shilpa Trujillo MD    sertraline (ZOLOFT) tablet 150 mg, 150 mg, Oral, Daily, Shilpa Trujillo MD, 150 mg at 02/02/20 0845    sucralfate (CARAFATE) oral suspension 1,000 mg, 1,000 mg, Oral, BID, Cat Torres MD, 1,000 mg at 02/02/20 0605    theophylline (JEF-24) 24 hr capsule 200 mg, 200 mg, Oral, Daily, Shilpa Trujillo MD, 200 mg at 02/02/20 0845    tiotropium [facility]) capsule for inhaler 18 mcg, 18 mcg, Inhalation, Daily, Shilpa Trujillo MD, 18 mcg at 02/02/20 0845   traZODone (DESYREL) tablet 25 mg, 25 mg, Oral, HS PRN, Ivonne Nevarez MD  Justification if on more than two antipsychotics:  Not applicable   Side effects if any:  None    Risks , benefits, side effects and precautions of medications discussed with patient and reminded patient to let us know any problems with medications     Objective:     Vital Signs:  Vitals:    02/01/20 2000 02/02/20 0500 02/02/20 0700 02/02/20 0705   BP: 107/61  116/75 116/56   BP Location: Left arm  Left arm Left arm   Pulse: 77  87 70   Resp: 18  18 18   Temp: (!) 97 1 °F (36 2 °C)  97 6 °F (36 4 °C)    TempSrc: Temporal  Temporal    SpO2: 92% 95%     Weight:   66 5 kg (146 lb 9 6 oz)    Height:         Appearance:  age appropriate, casually dressed, older than stated age and well dressed   Behavior:  guarded   Speech:  loud with stilted speech   Mood:  anxious, constricted, decreased range, dysthymic and irritable   Affect:  constricted, inappropriate, increased in intensity, increased in range and mood-congruent argumentative   Thought Process:  blocked, concrete, disorganized, illogical and perserverative tangential with to stilted speech   Thought Content:  delusions  persecutory about some of the staff tampering with her medications under oxygen and inhalants at times  No current suicidal homicidal thoughts intent or plans reported  No phobias obsessions or compulsions elicited     Perceptual Disturbances: None reported   Risk Potential: Due to potential for poor self-care skills and none   Sensorium:  person, place, time/date, situation, day of week, month of year, year and time   Cognition:  grossly intact with no problems with recent or remote memory   Consciousness:  Alert awake    Attention: Attention concentration span fair   Intellect: Estimated to be at least within average range   Insight:  fair   Judgment: fair      Motor Activity: no abnormal movements         Recent Labs:  Results Reviewed     None          I/O Past 24 hours: No intake/output data recorded  No intake/output data recorded  Assessment / Plan:     Schizoaffective disorder, bipolar type [facility])      Reason for continued inpatient care: To was is ability to maintain progress and see how she does on therapeutic passes with family  Acceptance by patient:  Accepting  Hopefulness in recovery:  Living at the [facility] home again  Understanding of medications :  Has good understanding  Involved in reintegration process:  Going out with staff for off ground activities  Trusting in relatoinship with psychiatrist:  Freda Cramer at this time    Recommended Treatment:    Medication changes:  1) no changes today    Non-pharmacological treatments  1) Continue with individual therapy, group therapy, milieu therapy and occupational therapy using recovery principles and psycho-education about accepting illness , medications and need for treatment   2) encouraged to attend to ADLs skills and attending groups as required  Safety  1) Safety/communication plan established targeting dynamic risk factors above  Discharge Plan to the [facility]    Counseling / Coordination of Care    Total floor / unit time spent today 15 minutes  Greater than 50% of total time was spent with the patient and / or family counseling and / or coordination of care  A description of the counseling / coordination of care  Patient's Rights, confidentiality and exceptions to confidentiality, use of automated medical record, [facility] staff access to [facility] record, and consent to treatment reviewed      Vincent Olivares MD

## 2020-02-02 NOTE — PLAN OF CARE
Problem: Alteration in Thoughts and Perception  Goal: Verbalize thoughts and feelings  Description  Interventions:  - Promote a nonjudgmental and trusting relationship with the patient through active listening and therapeutic communication  - Assess patient's level of functioning, behavior and potential for risk  - Engage patient in 1 on 1 interactions for a minimum of 15 minutes each session  - Encourage patient to express fears, feelings, frustrations, and discuss symptoms    - [place] patient to reality, help patient recognize reality-based thinking   - Administer medications as ordered and assess for potential side effects  - Provide the patient education related to the signs and symptoms of the illness and desired effects of prescribed medications  Outcome: Progressing  Goal: Agree to be compliant with medication regime, as prescribed and report medication side effects  Description  Interventions:  - Offer appropriate PRN medication and supervise ingestion; conduct aims, as needed   Outcome: Progressing  Goal: Attend and participate in unit activities, including therapeutic, recreational, and educational groups  Description  Interventions:  - Provide therapeutic and educational activities daily, encourage attendance and participation, and document same in the medical record     CERTIFIED PEER SPECIALIST INTERVENTIONS:    Complete peer assessment with patient to assess their needs and identify their goals to complete while in the recovery program as well as once discharged into the community  Patient will complete WRAP Plan, Crisis Plan and 5 Life Domains  Patient will attend 50% of groups offered on the unit  Patient will complete a goal card weekly      Outcome: Progressing  Goal: Complete daily ADLs, including personal hygiene independently, as able  Description  Interventions:  - Observe, teach, and assist patient with ADLS  - Monitor and promote a balance of rest/activity, with adequate nutrition and elimination   Outcome: Progressing     Problem: Depression  Goal: Refrain from isolation  Description  Interventions:  - Develop a trusting relationship   - Encourage socialization   Outcome: Progressing     [address] has been more visible this evening  She sat out @ phone chair in [place] when phone not in use to talk to staff, [email or web address]  Sat out in dining room chatting w/select peers  Ate 50% of meal (mashed potato, pie, milk), had an HS snack  Early in shift she requested to shower & was given assistance w/setup, hair care  Showed off a more organized clean bedside  Took part in PM Group  Did use the Incentive Spirometer achieving volumes of 1100ml  Came for all scheduled medicine except the Singulair  Wearing now her QHS humidified nasal O2 @ 1L for bed

## 2020-02-02 NOTE — PLAN OF CARE
Problem: Alteration in Thoughts and Perception  Goal: Treatment Goal: Gain control of psychotic behaviors/thinking, reduce/eliminate presenting symptoms and demonstrate improved reality functioning upon discharge  Outcome: Progressing as chart check    Received pt in bed at change of shift with eyes closed; chest movement noted  Continues the same thus this far as per q 15 min room checks  Will continue to monitor

## 2020-02-02 NOTE — PROGRESS NOTES
Progress Note - Efraín Sizer 1955, 60 y o  female MRN: [id number]    Unit/Bed#: [facility] 110-02 Encounter: [id number]    Primary Care Provider: Peewee Palafox PA-C   Date and time admitted to hospital: 7/23/2019  5:30 PM        * Schizoaffective disorder, bipolar type [facility])  Assessment & Plan  Psychiatry Progress Note  Patient is again psychosomatic and now claims that she may have a UTI as she was urinating more often than yesterday but without any chills or fever or foul-smelling urine  Again brought her attention to her being "psychosomatic"  She did finally take a shower yesterday after 6 days without any showers  She is wearing clean clothes and is friendly pleasant not argumentative today when though she tried to make me order some tests for to rule out UTI which I declined due to absence of symptoms  Was encouraged to continue to attend  groups work on her recovery by complying with her behavior plan by taking showers twice a week and wearing clean clothes  She still has stilted speech and still continues to harbor some underlying paranoia off and on but not aggressive or self-abusive and remains passive-aggressive at times  Compliant with medications no side effects reported  Eating well and sleeping well        Current medications:    Current Facility-Administered Medications:     acetaminophen (TYLENOL) tablet 325 mg, 325 mg, Oral, Q6H PRN, Jeet Levine MD    acetaminophen (TYLENOL) tablet 650 mg, 650 mg, Oral, Q6H PRN, Jeet Levine MD    acetaminophen (TYLENOL) tablet 650 mg, 650 mg, Oral, Q8H PRN, Jeet Levine MD    albuterol (PROVENTIL HFA,VENTOLIN HFA) inhaler 2 puff, 2 puff, Inhalation, Q4H PRN, Jeet Levine MD, 2 puff at 10/11/19 0424    aluminum-magnesium hydroxide-simethicone (MYLANTA) 200-200-20 mg/5 mL oral suspension 15 mL, 15 mL, Oral, Q4H PRN, Jeet Levine MD, 15 mL at 01/26/20 1021    ammonium lactate (LAC-HYDRIN) 12 % lotion 1 application, 1 application, Topical, BID PRN, Dalton Garner MD    benzonatate (TESSALON PERLES) capsule 100 mg, 100 mg, Oral, TID PRN, Dalton Garner MD    benztropine (COGENTIN) injection 1 mg, 1 mg, Intramuscular, Q8H PRN, Dalton Garner MD    carbamide peroxide (DEBROX) 6 5 % otic solution 5 drop, 5 drop, Left Ear, BID, Rona Correia MD, 5 drop at 02/02/20 0847    cloZAPine (CLOZARIL) tablet 25 mg, 25 mg, Oral, BID, Dalton Garner MD, 25 mg at 02/01/20 2200    cloZAPine (CLOZARIL) tablet 50 mg, 50 mg, Oral, BID, Dalton Garner MD, 50 mg at 02/01/20 2200    EPINEPHrine PF (ADRENALIN) 1 mg/mL injection 0 15 mg, 0 15 mg, Intramuscular, Once PRN, Dalton Garner MD    fluticasone-vilanterol (BREO ELLIPTA) 200-25 MCG/INH inhaler 1 puff, 1 puff, Inhalation, Daily, Dalton Garner MD, 1 puff at 02/02/20 0845    ketotifen (ZADITOR) 0 025 % ophthalmic solution 1 drop, 1 drop, Right Eye, BID PRN, Dalton Garner MD    levothyroxine tablet 125 mcg, 125 mcg, Oral, Early Morning, Dalton Garner MD, 125 mcg at 02/02/20 0605    magnesium hydroxide (MILK OF MAGNESIA) 400 mg/5 mL oral suspension 30 mL, 30 mL, Oral, Daily PRN, Dalton Garner MD    montelukast (SINGULAIR) tablet 10 mg, 10 mg, Oral, HS, Dalton Garner MD, 10 mg at 01/16/20 2106    OLANZapine (ZyPREXA) IM injection 5 mg, 5 mg, Intramuscular, Q8H PRN, Dalton Garner MD    OLANZapine (ZyPREXA) tablet 5 mg, 5 mg, Oral, Q8H PRN, Dalton Garner MD    ondansetron (ZOFRAN-ODT) dispersible tablet 4 mg, 4 mg, Oral, Q6H PRN, Dalton Garner MD, 4 mg at 12/09/19 1757    pantoprazole (PROTONIX) EC tablet 40 mg, 40 mg, Oral, Early Morning, Dalton Garner MD, 40 mg at 02/02/20 0605    polyethylene glycol (MIRALAX) packet 17 g, 17 g, Oral, Daily PRN, Dalton Garner MD    polyvinyl alcohol (LIQUIFILM TEARS) 1 4 % ophthalmic solution 1 drop, 1 drop, Both Eyes, Q3H PRN, Dalton Garner MD    sertraline (ZOLOFT) tablet 150 mg, 150 mg, Oral, Daily, Dalton Garner MD, 150 mg at 02/02/20 0845    sucralfate (CARAFATE) oral suspension 1,000 mg, 1,000 mg, Oral, BID, Vickie Timmons MD, 1,000 mg at 02/02/20 0605    theophylline (JEF-24) 24 hr capsule 200 mg, 200 mg, Oral, Daily, Prakash Brewster MD, 200 mg at 02/02/20 0845    tiotropium [facility]) capsule for inhaler 18 mcg, 18 mcg, Inhalation, Daily, Prakash Brewster MD, 18 mcg at 02/02/20 0845    traZODone (DESYREL) tablet 25 mg, 25 mg, Oral, HS PRN, Prakash Brewster MD  Justification if on more than two antipsychotics:  Not applicable   Side effects if any:  None    Risks , benefits, side effects and precautions of medications discussed with patient and reminded patient to let us know any problems with medications     Objective:     Vital Signs:  Vitals:    02/01/20 2000 02/02/20 0500 02/02/20 0700 02/02/20 0705   BP: 107/61  116/75 116/56   BP Location: Left arm  Left arm Left arm   Pulse: 77  87 70   Resp: 18  18 18   Temp: (!) 97 1 °F (36 2 °C)  97 6 °F (36 4 °C)    TempSrc: Temporal  Temporal    SpO2: 92% 95%     Weight:   66 5 kg (146 lb 9 6 oz)    Height:         Appearance:  age appropriate, casually dressed, older than stated age and well dressed   Behavior:  guarded   Speech:  loud with stilted speech   Mood:  anxious, constricted, decreased range, dysthymic and irritable   Affect:  constricted, inappropriate, increased in intensity, increased in range and mood-congruent argumentative   Thought Process:  blocked, concrete, disorganized, illogical and perserverative tangential with to stilted speech   Thought Content:  delusions  persecutory about some of the staff tampering with her medications under oxygen and inhalants at times  No current suicidal homicidal thoughts intent or plans reported  No phobias obsessions or compulsions elicited     Perceptual Disturbances: None reported   Risk Potential: Due to potential for poor self-care skills and none   Sensorium:  person, place, time/date, situation, day of week, month of year, year and time   Cognition:  grossly intact with no problems with recent or remote memory   Consciousness:  Alert awake    Attention: Attention concentration span fair   Intellect: Estimated to be at least within average range   Insight:  fair   Judgment: fair      Motor Activity: no abnormal movements         Recent Labs:  Results Reviewed     None          I/O Past 24 hours:  No intake/output data recorded  No intake/output data recorded  Assessment / Plan:     Schizoaffective disorder, bipolar type [facility])      Reason for continued inpatient care: To was is ability to maintain progress and see how she does on therapeutic passes with family  Acceptance by patient:  Accepting  Hopefulness in recovery:  Living at the [facility] home again  Understanding of medications :  Has good understanding  Involved in reintegration process:  Going out with staff for off ground activities  Trusting in relatoinship with psychiatrist:  Man Sheffield at this time    Recommended Treatment:    Medication changes:  1) no changes today    Non-pharmacological treatments  1) Continue with individual therapy, group therapy, milieu therapy and occupational therapy using recovery principles and psycho-education about accepting illness , medications and need for treatment   2) encouraged to attend to ADLs skills and attending groups as required  Safety  1) Safety/communication plan established targeting dynamic risk factors above  Discharge Plan to the [facility]    Counseling / Coordination of Care    Total floor / unit time spent today 15 minutes  Greater than 50% of total time was spent with the patient and / or family counseling and / or coordination of care  A description of the counseling / coordination of care  Patient's Rights, confidentiality and exceptions to confidentiality, use of automated medical record, [facility] staff access to medical record, and consent to treatment reviewed      Greer Beltran MD

## 2020-02-03 NOTE — PLAN OF CARE
Problem: Alteration in Thoughts and Perception  Goal: Agree to be compliant with medication regime, as prescribed and report medication side effects  Description  Interventions:  - Offer appropriate PRN medication and supervise ingestion; conduct aims, as needed   Outcome: Progressing  Goal: Attend and participate in unit activities, including therapeutic, recreational, and educational groups  Description  Interventions:  - Provide therapeutic and educational activities daily, encourage attendance and participation, and document same in the medical record     CERTIFIED PEER SPECIALIST INTERVENTIONS:    Complete peer assessment with patient to assess their needs and identify their goals to complete while in the recovery program as well as once discharged into the community  Patient will complete WRAP Plan, Crisis Plan and 5 Life Domains  Patient will attend 50% of groups offered on the unit  Patient will complete a goal card weekly  Outcome: Progressing     Problem: Depression  Goal: Refrain from isolation  Description  Interventions:  - Develop a trusting relationship   - Encourage socialization   Outcome: Not Progressing     [address] was isolative to room & bed most of shift w/exception of meal, scheduled medicines  She is pleasant, but, a bit intrusive into this nurse's business stemming from her concern for nurse having been out ill last week  Laughed when presented to her she is taking on mother role & said she does this a lot  She ate 75% of her meal w/[employer], joined peers to share the special Super Bowl treat of fresh pizza  Did not use the Incentive Spirometer as said too full from food to do a credible job  Came for all scheduled medicines except Singulair  Wearing now her QHS humidified nasal O2 @ 1L for bed

## 2020-02-03 NOTE — PROGRESS NOTES
02/03/20 0900   Team Meeting   Meeting Type Daily Rounds   Team Members Present   Team Members Present Physician;Nurse;; Other (Discipline and Name)   Physician Team Member Dr Donta Patton Team Member Tonny Santiago RN   Care Management Team Member Brenda Bah   Other (Discipline and Name) Leta Tamez LCSW     Patient can be intrusive at times  For [employer] trip tomorrow  Slept

## 2020-02-03 NOTE — PROGRESS NOTES
Pharmacy Clozapine Monitoring Progress Note     Terra Kan is receiving a total daily dose of 150 mg of clozapine divided as 75mg at 1600 and 75mg at 2000  Pharmacist Recommendations Based on Assessment and Plan (below):      1  Patient is Clozapine-REMS eligible, ANC was updated, with every 2 week monitoring frequency and the next [address] is due on 2/14/20  Assessment/Plan:    Phase of Treatment:     Current phase of treatment is initation/titration  Patient Clozapine REMS Eligibility:     Patient is eligible to receive clozapine and the [address] monitoring frequency is every two weeks per clozapine REMS  The patient's latest [address] value has been updated into the Clozapine-REMS program          ANC and Clozapine Level (if available)     The most recent [address] was   Lab Results   Component Value Date    NEUTROABS 3 60 01/31/2020    and the next lab is due on 2/14/20  Last Clozapine level (if available):    0   Lab Value Date/Time    CLOZAPINE 205 (L) 01/03/2020 0547     0   Lab Value Date/Time    NCLOZIP 119 01/03/2020 0547           Monitoring Parameters for Clozapine:     Clozapine Adverse Effect Suggested Monitoring Patient's Current Status    Agranulocytosis ANC baseline and then repeat weekly for first 6 months and every 2 weeks for the second 6 months  Maintenance after one year of therapy every month  The most recent [address] was   Lab Results   Component Value Date    NEUTROABS 3 60 01/31/2020       This ANC is considered normal range (ANC >/= 1500/mcL)  Continue current treatment and monitoring course  Respiratory Depression Please ensure patient is not on concomitant respiratory lowering medications such as benzodiazepines, sedative hypnotics (eg  zolpidem) This patient is not on respiratory depression lowering medications   Myocarditis Baseline and weekly EKG, CRP, BNP, and troponin    Weekly symptomatic complaints of fatigue, dyspnea, tachycardia, chest pain, palpitations, and fever for the first 4 weeks  Last EKG Results on  1/10/20 showed: "Normal sinus rhythm  Left axis deviation  Inferior infarct (cited on or before 10-NOLA-2020)  Abnormal ECG  When compared with ECG of 11-DEC-2019 20:41,  No significant change was found  Confirmed by Erich Noble ([id number]) on 1/13/2020 9:02:19 AM"      Last QTC value was:  0   Lab Value Date/Time    QTCINT 457 01/10/2020 2031       Last BNP was: No results found for: NTBNP    Last Troponin was:  0   Lab Value Date/Time    TROPONINI <0 01 05/01/2019 1318    TROPONINI <0 04 05/10/2015 1948        Orthostatic hypotension/  bradycardia Blood pressure and vital signs      Monitor blood pressure and orthostatic hypotension at least twice daily upon initiation and continue to follow at least once daily afterwards  Patient's last recorded vital signs:       BP 94/54 (BP Location: Left arm)   Pulse 66   Temp 97 7 °F (36 5 °C) (Temporal)   Resp 18   Ht 5' 4" (1 626 m)   Wt 66 5 kg (146 lb 9 6 oz)   SpO2 94%   BMI 25 16 kg/m²             Constipation (bowel obstruction due to high anticholinergic clozapine burden) Assess at baseline and weekly during first four months of therapy  Docusate 100mg BID and Miralax 17 grams daily recommended initially  Prophylactic bowel regimen ordered and includes: sucralfate   Sialorrhea Assess at baseline and weekly during first four months of therapy  May be managed using sublingual anticholinergic preparations (atropine 1% eye drops)  If needed atropine 1% eye drops can be used sublingually or glycopyrrolate or terazosin if patient non-allergic  Please note that per current REMS protocol the provider can submit a treatment rationale that justifies clozapine treatment even if patient parameters are outside of REMS recommendations  The Clozapine REMS is an FDA-mandated program implemented by the manufacturers of clozapine  ([employer] cy  com/CpmgClozapineUI/home  u)            Pharmacy will continue to follow patient with team, thank you    Electronically signed by: Bryson Arnold, PharmD, [address] 47, Clinical Pharmacist - Psychiatry

## 2020-02-03 NOTE — PLAN OF CARE
Problem: Alteration in Thoughts and Perception  Goal: Attend and participate in unit activities, including therapeutic, recreational, and educational groups  Description  Interventions:  - Provide therapeutic and educational activities daily, encourage attendance and participation, and document same in the medical record     CERTIFIED PEER SPECIALIST INTERVENTIONS:    Complete peer assessment with patient to assess their needs and identify their goals to complete while in the recovery program as well as once discharged into the community  Patient will complete WRAP Plan, Crisis Plan and 5 Life Domains  Patient will attend 50% of groups offered on the unit  Patient will complete a goal card weekly  Outcome: Progressing  Patient shows improvement in group attendance area however, she has turned down completing WRAP Plan, Life Domains or other recovery based work since on unit  Patient has obtained a 59% group attendance and does participate in group  Patient did not complete Goal Card for the week of 2/3/2020

## 2020-02-03 NOTE — PROGRESS NOTES
01/31/20 1100   Activity/Group Checklist   Group Other (Comment)  ( IMR - Weekly Goal Review/Mindfulness)   Attendance Attended   Attendance Duration (min) Greater than 60   Interactions Interacted appropriately   Affect/Mood Appropriate; Other (Comment)  (Somatic)   Goals Achieved Identified triggers; Discussed coping strategies; Displayed empathy;Able to listen to others; Able to engage in interactions; Able to reflect/comment on own behavior;Able to manage/cope with feelings; Able to self-disclose; Able to recieve feedback; Able to experience relief/decrease in symptoms     Patient attended [facility] - Weekly Goal Review/Mindfulness  Patient reported that she completed goals of taking a shower, working towards a pass next week via group attendance, and participating more  In the middle of group, patient calmly raised her hand to report that she was "having a panic attack "  Patient did not have any observable symptoms of panic or anxiety, including sweating, shaking, urge to leave the room, or a fear of dying  Patient was educated about panic attacks and she agreed that she was not experiencing the symptoms of a panic attack, perhaps just anxiety  Patient accepted redirection, but continues to engage in attention-seeking behaviors  Other than that, patient was alert, engaged, and participated

## 2020-02-03 NOTE — PROGRESS NOTES
02/03/20 1500   Activity/Group Checklist   Group Other (Comment)  (Graduation)   Attendance Did not attend     Patient was personally prompted by this writer to attend group, as her peer was graduating  Patient responded to writer, but declined to attend  As this is not a regularly scheduled group, patient will not be penalized for not attending

## 2020-02-03 NOTE — PROGRESS NOTES
02/03/20 1100   Activity/Group Checklist   Group   (IMR )   Attendance Attended   Attendance Duration (min) 46-60   Interactions Interacted appropriately   Affect/Mood Appropriate;Normal range   Goals Achieved Identified feelings; Identified distorted thoughts/beliefs; Able to listen to others; Able to manage/cope with feelings; Able to reflect/comment on own behavior;Able to engage in interactions; Able to self-disclose

## 2020-02-03 NOTE — PROGRESS NOTES
Progress Note - Anaqua 1955, 60 y o  female MRN: [id number]    [facility] Encounter: [id number]    Primary Care Provider: Praveen Barrios PA-C   Date and time admitted to hospital: 7/23/2019  5:30 PM        * Schizoaffective disorder, bipolar type [facility])  Assessment & Plan  Did attend super-bowl party and ate pizza , had a shower two days ago and hopes to go to [place], still with some psychosomatic sxs, still accusatory to certain staff off and on about tampering with inhalants or her oxygen concentrator she uses at night, still with stilted speech, preoccupied, still with passive psychosomatic sxs, no side effects, compliant with meds, eats better, no overt delusions elicited or reported, eats and sleeps better, hygiene is better with improvement in ADLs and reminded she is expected to have another shower tonight or tomorrow am before she goes out to [employer]   Complinat with meds now      Current medications:    Current Facility-Administered Medications:     acetaminophen (TYLENOL) tablet 325 mg, 325 mg, Oral, Q6H PRN, Felisa Florence MD    acetaminophen (TYLENOL) tablet 650 mg, 650 mg, Oral, Q6H PRN, Felisa Florence MD    acetaminophen (TYLENOL) tablet 650 mg, 650 mg, Oral, Q8H PRN, Felisa Florence MD    albuterol (PROVENTIL HFA,VENTOLIN HFA) inhaler 2 puff, 2 puff, Inhalation, Q4H PRN, Felisa Florence MD, 2 puff at 10/11/19 0424    aluminum-magnesium hydroxide-simethicone (MYLANTA) 200-200-20 mg/5 mL oral suspension 15 mL, 15 mL, Oral, Q4H PRN, Felisa Florence MD, 15 mL at 01/26/20 1021    ammonium lactate (LAC-HYDRIN) 12 % lotion 1 application, 1 application, Topical, BID PRN, Felisa Florence MD    benzonatate (TESSALON PERLES) capsule 100 mg, 100 mg, Oral, TID PRN, Felisa Florence MD    benztropine (COGENTIN) injection 1 mg, 1 mg, Intramuscular, Q8H PRN, Felisa Florence MD    carbamide peroxide (DEBROX) 6 5 % otic solution 5 drop, 5 drop, Left Ear, BID, Rona Davis MD, 5 drop at 02/02/20 2133    cloZAPine (CLOZARIL) tablet 25 mg, 25 mg, Oral, BID, Jill Gayle MD, 25 mg at 02/02/20 2132    cloZAPine (CLOZARIL) tablet 50 mg, 50 mg, Oral, BID, Jill Gayle MD, 50 mg at 02/02/20 2133    EPINEPHrine PF (ADRENALIN) 1 mg/mL injection 0 15 mg, 0 15 mg, Intramuscular, Once PRN, Jill Gayle MD    fluticasone-vilanterol (BREO ELLIPTA) 200-25 MCG/INH inhaler 1 puff, 1 puff, Inhalation, Daily, Jill Gayle MD, 1 puff at 02/02/20 0845    ketotifen (ZADITOR) 0 025 % ophthalmic solution 1 drop, 1 drop, Right Eye, BID PRN, Jill Gayle MD    levothyroxine tablet 125 mcg, 125 mcg, Oral, Early Morning, Jill Gayle MD, 125 mcg at 02/03/20 0612    magnesium hydroxide (MILK OF MAGNESIA) 400 mg/5 mL oral suspension 30 mL, 30 mL, Oral, Daily PRN, Jill Gayle MD    montelukast (SINGULAIR) tablet 10 mg, 10 mg, Oral, HS, Jill Gayle MD, 10 mg at 01/16/20 2106    OLANZapine (ZyPREXA) IM injection 5 mg, 5 mg, Intramuscular, Q8H PRN, Jill Gayle MD    OLANZapine (ZyPREXA) tablet 5 mg, 5 mg, Oral, Q8H PRN, Jill Gayle MD    ondansetron (ZOFRAN-ODT) dispersible tablet 4 mg, 4 mg, Oral, Q6H PRN, Jill Gayle MD, 4 mg at 12/09/19 1757    pantoprazole (PROTONIX) EC tablet 40 mg, 40 mg, Oral, Early Morning, Jill Gayle MD, 40 mg at 02/03/20 0612    polyethylene glycol (MIRALAX) packet 17 g, 17 g, Oral, Daily PRN, Jill Gayle MD    polyvinyl alcohol (LIQUIFILM TEARS) 1 4 % ophthalmic solution 1 drop, 1 drop, Both Eyes, Q3H PRN, Jill Gayle MD    sertraline (ZOLOFT) tablet 150 mg, 150 mg, Oral, Daily, Jill Gayle MD, 150 mg at 02/02/20 0845    sucralfate (CARAFATE) oral suspension 1,000 mg, 1,000 mg, Oral, BID, Cat Torres MD, 1,000 mg at 02/03/20 0612    theophylline (JEF-24) 24 hr capsule 200 mg, 200 mg, Oral, Daily, Jill Gayle MD, 200 mg at 02/02/20 0845    tiotropium [facility]) capsule for inhaler 18 mcg, 18 mcg, Inhalation, Daily, Jill Gayle MD, 18 mcg at 02/02/20 0845   traZODone (DESYREL) tablet 25 mg, 25 mg, Oral, HS PRN, Meldon Curling, MD  Justification if on more than two antipsychotics: not applicable  Side effects if any: none    Risks , benefits, side effects and precautions of medications discussed with patient and reminded patient to let us know any problems with medications     Objective:     Vital Signs:  Vitals:    02/02/20 0705 02/02/20 1600 02/02/20 2000 02/03/20 0618   BP: 116/56 118/73 101/58    BP Location: Left arm Left arm Left arm    Pulse: 70 78 74    Resp: 18 16 18    Temp:  97 7 °F (36 5 °C) (!) 96 7 °F (35 9 °C)    TempSrc:  Temporal Temporal    SpO2:  99% 91% 95%   Weight:       Height:         Appearance:  age appropriate, casually dressed, disheveled and older than stated age poorly groomed good eye contact   Behavior:  evasive and guarded but more friendly    Speech:  normal pitch and normal volume loud   Mood:  anxious, euphoric and irritable at times   Affect:  inappropriate, increased in intensity and increased in range anxious   Thought Process:  concrete, goal directed, illogical and perserverative   Thought Content:  delusions  grandiose and persecutory being suspicious of certain staff 's motives, no current s/h thoughts intent o rplnas, no phobias or OCD sxs, no delsuions about a microchip under her lipoma on her hand   Perceptual Disturbances: None and not appearing to respond    Risk Potential: for poor adls    Sensorium:  person, place, time/date, situation, day of week, month of year, year and time   Cognition:  grossly intact with no deficit in recent or remote memory   Consciousness:  alert and awake    Attention: intact   Intellect: average   Insight:  fair   Judgment: fair      Motor Activity: no abnormal movements         Recent Labs:  Results Reviewed     None          I/O Past 24 hours:  No intake/output data recorded  No intake/output data recorded          Assessment / Plan:     Schizoaffective disorder, bipolar type [facility])      Reason for continued inpatient care: to assess ability to maintain improvement and see how she does on outing with family  Acceptance by patient: accepting  Hopefulness in recovery: living at the [facility]  Understanding of medications :  yes  Involved in reintegration process: attending groups  Trusting in relatoinship with psychiatrist:  Trusting     Recommended Treatment:    Medication changes:  1) none    Non-pharmacological treatments  1) Continue with individual therapy, group therapy, milieu therapy and occupational therapy using recovery principles and psycho-education about accepting illness and need for treatment   2) encourage to take showers twice a week and cooperate with treatment and adl skills  Safety  1) Safety/communication plan established targeting dynamic risk factors above  Discharge Plan to a [facility] at [address] / Coordination of Care    Total floor / unit time spent today 15 minutes  Greater than 50% of total time was spent with the patient and / or family counseling and / or coordination of care  A description of the counseling / coordination of care  Patient's Rights, confidentiality and exceptions to confidentiality, use of automated medical record, [address] [place] [address] staff access to medical record, and consent to treatment reviewed      Jerome Lopez MD

## 2020-02-03 NOTE — PLAN OF CARE
Problem: Alteration in Thoughts and Perception  Goal: Treatment Goal: Gain control of psychotic behaviors/thinking, reduce/eliminate presenting symptoms and demonstrate improved reality functioning upon discharge  Outcome: Progressing  Goal: Verbalize thoughts and feelings  Description  Interventions:  - Promote a nonjudgmental and trusting relationship with the patient through active listening and therapeutic communication  - Assess patient's level of functioning, behavior and potential for risk  - Engage patient in 1 on 1 interactions for a minimum of 15 minutes each session  - Encourage patient to express fears, feelings, frustrations, and discuss symptoms    - [place] patient to reality, help patient recognize reality-based thinking   - Administer medications as ordered and assess for potential side effects  - Provide the patient education related to the signs and symptoms of the illness and desired effects of prescribed medications  Outcome: Progressing  Goal: Agree to be compliant with medication regime, as prescribed and report medication side effects  Description  Interventions:  - Offer appropriate PRN medication and supervise ingestion; conduct aims, as needed   Outcome: Progressing  Goal: Attend and participate in unit activities, including therapeutic, recreational, and educational groups  Description  Interventions:  - Provide therapeutic and educational activities daily, encourage attendance and participation, and document same in the medical record     CERTIFIED PEER SPECIALIST INTERVENTIONS:    Complete peer assessment with patient to assess their needs and identify their goals to complete while in the recovery program as well as once discharged into the community  Patient will complete WRAP Plan, Crisis Plan and 5 Life Domains  Patient will attend 50% of groups offered on the unit  Patient will complete a goal card weekly      Outcome: Progressing     Problem: Ineffective Coping  Goal: Participates in unit activities  Description  Interventions:  - Provide therapeutic environment   - Provide required programming   - Redirect inappropriate behaviors   Outcome: Progressing  Goal: Patient/Family verbalizes awareness of resources  Outcome: Progressing  Goal: Understands least restrictive measures  Description  Interventions:  - Utilize least restrictive behavior  Outcome: Progressing     Problem: Risk for Self Injury/Neglect  Goal: Treatment Goal: Remain safe during length of stay, learn and adopt new coping skills, and be free of self-injurious ideation, impulses and acts at the time of discharge  Outcome: Progressing  Goal: Verbalize thoughts and feelings  Description  Interventions:  - Assess and re-assess patient's lethality and potential for self-injury  - Engage patient in 1:1 interactions, daily, for a minimum of 15 minutes  - Encourage patient to express feelings, fears, frustrations, hopes  - Establish rapport/trust with patient   Outcome: Progressing  Goal: Refrain from harming self  Description  Interventions:  - Monitor patient closely, per order  - Develop a trusting relationship  - Supervise medication ingestion, monitor effects and side effects   Outcome: Progressing  Goal: Attend and participate in unit activities, including therapeutic, recreational, and educational groups  Description  Interventions:  - Provide therapeutic and educational activities daily, encourage attendance and participation, and document same in the medical record  - Obtain collateral information, encourage visitation and family involvement in care   Outcome: Progressing     Problem: Depression  Goal: Treatment Goal: Demonstrate behavioral control of depressive symptoms, verbalize feelings of improved mood/affect, and adopt new coping skills prior to discharge  Outcome: Progressing  Goal: Verbalize thoughts and feelings  Description  Interventions:  - Assess and re-assess patient's level of risk   - Engage patient in 1:1 interactions, daily, for a minimum of 15 minutes   - Encourage patient to express feelings, fears, frustrations, hopes   Outcome: Progressing  Goal: Refrain from isolation  Description  Interventions:  - Develop a trusting relationship   - Encourage socialization   Outcome: Progressing     Problem: Anxiety  Goal: Anxiety is at manageable level  Description  Interventions:  - Assess and monitor patient's anxiety level  - Monitor for signs and symptoms of anxiety both physical and emotional (heart palpitations, chest pain, shortness of breath, headaches, nausea, feeling jumpy, restlessness, irritable, apprehensive)  - Collaborate with interdisciplinary team and initiate plan and interventions as ordered    - [place] patient to unit/surroundings  - Explain treatment plan  - Encourage participation in care  - Encourage verbalization of concerns/fears  - Identify coping mechanisms  - Assist in developing anxiety-reducing skills  - Administer/offer alternative therapies  - Limit or eliminate stimulants  Outcome: Progressing     Problem: Alteration in Orientation  Goal: Interact with staff daily  Description  Interventions:  - Assess and re-assess patient's level of orientation  - Engage patient in 1 on 1 interactions, daily, for a minimum of 15 minutes   - Establish rapport/trust with patient   Outcome: Progressing     Problem: PAIN - ADULT  Goal: Verbalizes/displays adequate comfort level or baseline comfort level  Description  Interventions:  - Encourage patient to monitor pain and request assistance  - Assess pain using appropriate pain scale  - Administer analgesics based on type and severity of pain and evaluate response  - Implement non-pharmacological measures as appropriate and evaluate response  - Consider cultural and social influences on pain and pain management  - Notify physician/advanced practitioner if interventions unsuccessful or patient reports new pain  Outcome: Progressing     Problem: SAFETY ADULT  Goal: Patient will remain free of falls  Description  INTERVENTIONS:  - Assess patient frequently for physical needs  -  Identify cognitive and physical deficits and behaviors that affect risk of falls  -  [place] fall precautions as indicated by assessment   - Educate patient/family on patient safety including physical limitations  - Instruct patient to call for assistance with activity based on assessment  - Modify environment to reduce risk of injury  - Consider OT/PT consult to assist with strengthening/mobility  Outcome: Progressing     Problem: Nutrition/Hydration-ADULT  Goal: Nutrient/Hydration intake appropriate for improving, restoring or maintaining nutritional needs  Description  Monitor and assess patient's nutrition/hydration status for malnutrition  Collaborate with interdisciplinary team and initiate plan and interventions as ordered  Monitor patient's weight and dietary intake as ordered or per policy  Utilize nutrition screening tool and intervene as necessary  Determine patient's food preferences and provide high-protein, high-caloric foods as appropriate  INTERVENTIONS:  - Monitor oral intake, urinary output, labs, and treatment plans  - Assess nutrition and hydration status and recommend course of action  - Evaluate amount of meals eaten  - Assist patient with eating if necessary   - Allow adequate time for meals  - Recommend/ encourage appropriate diets, oral nutritional supplements, and vitamin/mineral supplements  - Order, calculate, and assess calorie counts as needed  - Recommend, monitor, and adjust tube feedings and TPN/PPN based on assessed needs  - Assess need for intravenous fluids  - Provide specific nutrition/hydration education as appropriate  - Include patient/family/caregiver in decisions related to nutrition  Outcome: Progressing   Mostly isolative to her bed, out for meds and meals, attended IMR group  Ate 50% of breakfast and 10% of lunch (chocolate cake)  No somatic complaints voiced or issues noted  Continue to monitor

## 2020-02-04 NOTE — ASSESSMENT & PLAN NOTE
Did not take a shower last night or this am despite reminders about it so she was told we cannot allow her to go out with staff to [facility] with staff escort as was scheduled  Again psychosomatic today claiming sh eis feeling dizzy but did attend team meeting  Still attending 59% of groups friendly but still complains of certain staff, accusatory about them tampering with her inhalant or oxygen concentrator and comes up with multiple excuses for not taking showers twice a week as per her behav plan  No other overt delusions elicited now,  Still poorly groomed, prefers to be on bed in am and still with stilted rambling speech, compliant with meds, tolerating meds with no side effect so far   Labs Ok so far for clozapine  Current medications:    Current Facility-Administered Medications:     acetaminophen (TYLENOL) tablet 325 mg, 325 mg, Oral, Q6H PRN, Felisa Florence MD    acetaminophen (TYLENOL) tablet 650 mg, 650 mg, Oral, Q6H PRN, Felisa Florence MD    acetaminophen (TYLENOL) tablet 650 mg, 650 mg, Oral, Q8H PRN, Felisa Florence MD    albuterol (PROVENTIL HFA,VENTOLIN HFA) inhaler 2 puff, 2 puff, Inhalation, Q4H PRN, Felisa Florence MD, 2 puff at 10/11/19 0424    aluminum-magnesium hydroxide-simethicone (MYLANTA) 200-200-20 mg/5 mL oral suspension 15 mL, 15 mL, Oral, Q4H PRN, Felisa Florence MD, 15 mL at 01/26/20 1021    ammonium lactate (LAC-HYDRIN) 12 % lotion 1 application, 1 application, Topical, BID PRN, Felisa Florence MD    benzonatate (TESSALON PERLES) capsule 100 mg, 100 mg, Oral, TID PRN, Felisa Florence MD    benztropine (COGENTIN) injection 1 mg, 1 mg, Intramuscular, Q8H PRN, Felisa Florence MD    carbamide peroxide (DEBROX) 6 5 % otic solution 5 drop, 5 drop, Left Ear, BID, Rona Davis MD, 5 drop at 02/03/20 2116    cloZAPine (CLOZARIL) tablet 25 mg, 25 mg, Oral, BID, Felisa Florence MD, 25 mg at 02/03/20 2115    cloZAPine (CLOZARIL) tablet 50 mg, 50 mg, Oral, BID, Felisa Florence MD, 50 mg at 02/03/20 2115   EPINEPHrine PF (ADRENALIN) 1 mg/mL injection 0 15 mg, 0 15 mg, Intramuscular, Once PRN, Prakash Brewster MD    fluticasone-vilanterol (BREO ELLIPTA) 200-25 MCG/INH inhaler 1 puff, 1 puff, Inhalation, Daily, Prakash Brewster MD, 1 puff at 02/04/20 0900    ketotifen (ZADITOR) 0 025 % ophthalmic solution 1 drop, 1 drop, Right Eye, BID PRN, Prakash Brewster MD    levothyroxine tablet 125 mcg, 125 mcg, Oral, Early Morning, Prakash Brewster MD, 125 mcg at 02/04/20 8757    magnesium hydroxide (MILK OF MAGNESIA) 400 mg/5 mL oral suspension 30 mL, 30 mL, Oral, Daily PRN, Prakash Brewster MD    montelukast (SINGULAIR) tablet 10 mg, 10 mg, Oral, HS, Prakash Brewster MD, 10 mg at 01/16/20 2106    OLANZapine (ZyPREXA) IM injection 5 mg, 5 mg, Intramuscular, Q8H PRN, Prakash Brewster MD    OLANZapine (ZyPREXA) tablet 5 mg, 5 mg, Oral, Q8H PRN, Prakash Brewster MD    ondansetron (ZOFRAN-ODT) dispersible tablet 4 mg, 4 mg, Oral, Q6H PRN, Prakash Brewster MD, 4 mg at 12/09/19 1757    pantoprazole (PROTONIX) EC tablet 40 mg, 40 mg, Oral, Early Morning, Prakash Brewster MD, 40 mg at 02/04/20 0608    polyethylene glycol (MIRALAX) packet 17 g, 17 g, Oral, Daily PRN, Prakash Brewster MD    polyvinyl alcohol (LIQUIFILM TEARS) 1 4 % ophthalmic solution 1 drop, 1 drop, Both Eyes, Q3H PRN, Prakash Brewster MD    sertraline (ZOLOFT) tablet 150 mg, 150 mg, Oral, Daily, Prakash Brewster MD, 150 mg at 02/04/20 0902    sucralfate (CARAFATE) oral suspension 1,000 mg, 1,000 mg, Oral, BID, Cat Torres MD, 1,000 mg at 02/04/20 0607    theophylline (JEF-24) 24 hr capsule 200 mg, 200 mg, Oral, Daily, Prakash Brewster MD, 200 mg at 02/04/20 0902    tiotropium [facility]) capsule for inhaler 18 mcg, 18 mcg, Inhalation, Daily, Prakash Brewster MD, 18 mcg at 02/04/20 0900    traZODone (DESYREL) tablet 25 mg, 25 mg, Oral, HS PRN, Prakash Brewster MD  Justification if on more than two antipsychotics: not applicable  Side effects if any: none    Risks , benefits, side effects and precautions of medications discussed with patient and reminded patient to let us know any problems with medications     Objective:     Vital Signs:  Vitals:    02/03/20 0705 02/03/20 1621 02/03/20 1948 02/04/20 0700   BP: 94/54 108/67 90/51 108/66   BP Location: Left arm Left arm Left arm Right arm   Pulse: 66 91 97 79   Resp: 18 16 18 18   Temp:  97 6 °F (36 4 °C) 98 2 °F (36 8 °C) 97 6 °F (36 4 °C)   TempSrc:  Temporal Temporal    SpO2:  90% 93%    Weight:       Height:         Appearance:  age appropriate, casually dressed, disheveled and older than stated age better eye contact, poorly groomed   Behavior:  deviant, evasive and guarded polite but suspicious    Speech:  loud and tangential    Mood:  anxious, euphoric and irritable off and on   Affect:  increased in intensity, increased in range, mood-congruent and redirectable   Thought Process:  circumstantial, concrete, disorganized, goal directed, illogical and perserverative with stilted speech   Thought Content:  delusions  grandiose and persecutory  Again suspicious of certain staff,  No bizarre delsuions or preoccupation with violence and no obsessions, no current s/h thoughts intent or plans,    Perceptual Disturbances: None and not visibly responding   Risk Potential: for poor self care    Sensorium:  person, place, time/date, situation, day of week, month of year, year and time   Cognition:  grossly intact with no language deficit and no deficit in recent or remote memory   Consciousness:  alert and awake    Attention: Attention and concentration intact   Intellect: Estimated to be average   Insight:  fair   Judgment: fair      Motor Activity: no abnormal movements         Recent Labs:  Results Reviewed     None          I/O Past 24 hours:  No intake/output data recorded  No intake/output data recorded          Assessment / Plan:     Schizoaffective disorder, bipolar type [facility])      Reason for continued inpatient care: to assess ability to maintain improvement and see how she does on outing with family after another outing with staff escort  Acceptance by patient: accepting luly  Hopefulness in recovery: living at the [facility] soon  Understanding of medications :  yes  Involved in reintegration process: attending groups and has off grounds and off unit privileges  Trusting in relatoinship with psychiatrist:  Khalif Elliott now    Recommended Treatment:    Medication changes:  1) none    Non-pharmacological treatments  1) Continue with individual therapy, group therapy, milieu therapy and occupational therapy using recovery principles and psycho-education about accepting illness and need for treatment   2) encourage to take showers twice a week and cooperate with treatment and adl skills as per her behav  plan  30 reschedule another pass with staff to community before pass with sister to see if she would fully comply   Safety  1) Safety/communication plan established targeting dynamic risk factors above  Discharge Plan to a [facility] at [address] / Coordination of Care    Total floor / unit time spent today 20 minutes  Greater than 50% of total time was spent with the patient and / or family counseling and / or coordination of care  A description of the counseling / coordination of care  Patient's Rights, confidentiality and exceptions to confidentiality, use of automated medical record, [facility] staff access to medical record, and consent to treatment reviewed      Roberto Shankar MD

## 2020-02-04 NOTE — PROGRESS NOTES
Progress Note - Lizbeth Jhaveri 1955, 60 y o  female MRN: [id number]    [facility] Encounter: [id number]    Primary Care Provider: Alonso Carmona PA-C   Date and time admitted to hospital: 7/23/2019  5:30 PM        * Schizoaffective disorder, bipolar type [facility])  Assessment & Plan  Did not take a shower last night or this am despite reminders about it so she was told we cannot allow her to go out with staff to [facility] with staff escort as was scheduled  Again psychosomatic today claiming sh eis feeling dizzy but did attend team meeting  Still attending 59% of groups friendly but still complains of certain staff, accusatory about them tampering with her inhalant or oxygen concentrator and comes up with multiple excuses for not taking showers twice a week as per her behav plan  No other overt delusions elicited now,  Still poorly groomed, prefers to be on bed in am and still with stilted rambling speech, compliant with meds, tolerating meds with no side effect so far   Labs Ok so far for clozapine  Current medications:    Current Facility-Administered Medications:     acetaminophen (TYLENOL) tablet 325 mg, 325 mg, Oral, Q6H PRN, Sarah Hanna MD    acetaminophen (TYLENOL) tablet 650 mg, 650 mg, Oral, Q6H PRN, Sarah Hanna MD    acetaminophen (TYLENOL) tablet 650 mg, 650 mg, Oral, Q8H PRN, Sarah Hanna MD    albuterol (PROVENTIL HFA,VENTOLIN HFA) inhaler 2 puff, 2 puff, Inhalation, Q4H PRN, Sarah Hanna MD, 2 puff at 10/11/19 0424    aluminum-magnesium hydroxide-simethicone (MYLANTA) 200-200-20 mg/5 mL oral suspension 15 mL, 15 mL, Oral, Q4H PRN, Sarah Hanna MD, 15 mL at 01/26/20 1021    ammonium lactate (LAC-HYDRIN) 12 % lotion 1 application, 1 application, Topical, BID PRN, Sarah Hanna MD    benzonatate (TESSALON PERLES) capsule 100 mg, 100 mg, Oral, TID PRN, Sarah Hanna MD    benztropine (COGENTIN) injection 1 mg, 1 mg, Intramuscular, Q8H PRN, Sarah Hanna MD    carbamide peroxide (DEBROX) 6 5 % otic solution 5 drop, 5 drop, Left Ear, BID, Rona Aguayo MD, 5 drop at 02/03/20 2116    cloZAPine (CLOZARIL) tablet 25 mg, 25 mg, Oral, BID, Zander Yanez MD, 25 mg at 02/03/20 2115    cloZAPine (CLOZARIL) tablet 50 mg, 50 mg, Oral, BID, Zander Yanez MD, 50 mg at 02/03/20 2115    EPINEPHrine PF (ADRENALIN) 1 mg/mL injection 0 15 mg, 0 15 mg, Intramuscular, Once PRN, Zander Yanez MD    fluticasone-vilanterol (BREO ELLIPTA) 200-25 MCG/INH inhaler 1 puff, 1 puff, Inhalation, Daily, Zander Yanez MD, 1 puff at 02/04/20 0900    ketotifen (ZADITOR) 0 025 % ophthalmic solution 1 drop, 1 drop, Right Eye, BID PRN, Zander Yanez MD    levothyroxine tablet 125 mcg, 125 mcg, Oral, Early Morning, Zander Yanez MD, 125 mcg at 02/04/20 2433    magnesium hydroxide (MILK OF MAGNESIA) 400 mg/5 mL oral suspension 30 mL, 30 mL, Oral, Daily PRN, Zander Yanez MD    montelukast (SINGULAIR) tablet 10 mg, 10 mg, Oral, HS, Zander Yanez MD, 10 mg at 01/16/20 2106    OLANZapine (ZyPREXA) IM injection 5 mg, 5 mg, Intramuscular, Q8H PRN, Zander Yanez MD    OLANZapine (ZyPREXA) tablet 5 mg, 5 mg, Oral, Q8H PRN, Zander Yanez MD    ondansetron (ZOFRAN-ODT) dispersible tablet 4 mg, 4 mg, Oral, Q6H PRN, Zander Yanez MD, 4 mg at 12/09/19 1757    pantoprazole (PROTONIX) EC tablet 40 mg, 40 mg, Oral, Early Morning, Zander Yanez MD, 40 mg at 02/04/20 0608    polyethylene glycol (MIRALAX) packet 17 g, 17 g, Oral, Daily PRN, Zander Yanez MD    polyvinyl alcohol (LIQUIFILM TEARS) 1 4 % ophthalmic solution 1 drop, 1 drop, Both Eyes, Q3H PRN, Zander Yanez MD    sertraline (ZOLOFT) tablet 150 mg, 150 mg, Oral, Daily, Zander Yanez MD, 150 mg at 02/04/20 0902    sucralfate (CARAFATE) oral suspension 1,000 mg, 1,000 mg, Oral, BID, Cat Torres MD, 1,000 mg at 02/04/20 0607    theophylline (JEF-24) 24 hr capsule 200 mg, 200 mg, Oral, Daily, Zander Yanez MD, 200 mg at 02/04/20 0902    tiotropium [facility]) capsule for inhaler 18 mcg, 18 mcg, Inhalation, Daily, Manuel Copeland MD, 18 mcg at 02/04/20 0900    traZODone (DESYREL) tablet 25 mg, 25 mg, Oral, HS PRN, Manuel Copeland MD  Justification if on more than two antipsychotics: not applicable  Side effects if any: none    Risks , benefits, side effects and precautions of medications discussed with patient and reminded patient to let us know any problems with medications     Objective:     Vital Signs:  Vitals:    02/03/20 0705 02/03/20 1621 02/03/20 1948 02/04/20 0700   BP: 94/54 108/67 90/51 108/66   BP Location: Left arm Left arm Left arm Right arm   Pulse: 66 91 97 79   Resp: 18 16 18 18   Temp:  97 6 °F (36 4 °C) 98 2 °F (36 8 °C) 97 6 °F (36 4 °C)   TempSrc:  Temporal Temporal    SpO2:  90% 93%    Weight:       Height:         Appearance:  age appropriate, casually dressed, disheveled and older than stated age better eye contact, poorly groomed   Behavior:  deviant, evasive and guarded polite but suspicious    Speech:  loud and tangential    Mood:  anxious, euphoric and irritable off and on   Affect:  increased in intensity, increased in range, mood-congruent and redirectable   Thought Process:  circumstantial, concrete, disorganized, goal directed, illogical and perserverative with stilted speech   Thought Content:  delusions  grandiose and persecutory  Again suspicious of certain staff,  No bizarre delsuions or preoccupation with violence and no obsessions, no current s/h thoughts intent or plans,    Perceptual Disturbances: None and not visibly responding   Risk Potential: for poor self care    Sensorium:  person, place, time/date, situation, day of week, month of year, year and time   Cognition:  grossly intact with no language deficit and no deficit in recent or remote memory   Consciousness:  alert and awake    Attention: Attention and concentration intact   Intellect: Estimated to be average   Insight:  fair   Judgment: fair      Motor Activity: no abnormal movements Recent Labs:  Results Reviewed     None          I/O Past 24 hours:  No intake/output data recorded  No intake/output data recorded  Assessment / Plan:     Schizoaffective disorder, bipolar type ([address] )      Reason for continued inpatient care: to assess ability to maintain improvement and see how she does on outing with family after another outing with staff escort  Acceptance by patient: accepting luly  Hopefulness in recovery: living at the [facility]  Understanding of medications :  yes  Involved in reintegration process: attending groups and has off grounds and off unit privileges  Trusting in relatoinship with psychiatrist:  Antoine Ibarra now    Recommended Treatment:    Medication changes:  1) none    Non-pharmacological treatments  1) Continue with individual therapy, group therapy, milieu therapy and occupational therapy using recovery principles and psycho-education about accepting illness and need for treatment   2) encourage to take showers twice a week and cooperate with treatment and adl skills as per her behav  plan  30 reschedule another pass with staff to community before pass with sister to see if she would fully comply   Safety  1) Safety/communication plan established targeting dynamic risk factors above  Discharge Plan to a [facility] at [address] / Coordination of Care    Total floor / unit time spent today 15 minutes  Greater than 50% of total time was spent with the patient and / or family counseling and / or coordination of care  She was receptive to counseling about need to take showers twice a week and complying with the behavior plan  Patient's Rights, confidentiality and exceptions to confidentiality, use of automated medical record, [facility] staff access to medical record, and consent to treatment reviewed      Meredith Wesley MD

## 2020-02-04 NOTE — PLAN OF CARE
Problem: Alteration in Thoughts and Perception  Goal: Verbalize thoughts and feelings  Description  Interventions:  - Promote a nonjudgmental and trusting relationship with the patient through active listening and therapeutic communication  - Assess patient's level of functioning, behavior and potential for risk  - Engage patient in 1 on 1 interactions for a minimum of 15 minutes each session  - Encourage patient to express fears, feelings, frustrations, and discuss symptoms    - [place] patient to reality, help patient recognize reality-based thinking   - Administer medications as ordered and assess for potential side effects  - Provide the patient education related to the signs and symptoms of the illness and desired effects of prescribed medications  Outcome: Progressing  Goal: Agree to be compliant with medication regime, as prescribed and report medication side effects  Description  Interventions:  - Offer appropriate PRN medication and supervise ingestion; conduct aims, as needed   Outcome: Progressing     Problem: Alteration in Thoughts and Perception  Goal: Complete daily ADLs, including personal hygiene independently, as able  Description  Interventions:  - Observe, teach, and assist patient with ADLS  - Monitor and promote a balance of rest/activity, with adequate nutrition and elimination   Outcome: Not Progressing     Problem: Ineffective Coping  Goal: Demonstrates healthy coping skills  Outcome: Not Progressing     Problem: Risk for Self Injury/Neglect  Goal: Recognize maladaptive responses and adopt new coping mechanisms  Outcome: Not Progressing     Problem: Depression  Goal: Refrain from isolation  Description  Interventions:  - Develop a trusting relationship   - Encourage socialization   Outcome: Not Adama [place] is angry, sulky, refusing to take shower tonight ("I don't have to" even though knows would have her favorite staff assisting) & says is not going on the outing tomorrow to [employer]  Feels "wasn't given enough notice", is being "scammed" by the , wasn't given choice of where they will go  Doesn't want to go to [facility], wants [phone], or better yet, "I really wanted to go to a diner " Reminded her the destination isn't the point; it is to get comfortable w/being out in the community & to demonstrate function, ability to follow a preconceived plan  "Yes it IS the point!" re: destination  So incensed that laying now in bed w/head to wall & refusing to speak further w/this nurse  Did eat 100% of meal, came up on own for supper medicines

## 2020-02-04 NOTE — PROGRESS NOTES
02/04/20 0900   Team Meeting   Meeting Type Daily Rounds   Team Members Present   Team Members Present Physician;Nurse;; Other (Discipline and Name)   Physician Team Member Dr Hong Briggs, RN   Care Management Team Member Brenda Bah   Other (Discipline and Name) Leta Tamez LCSW     Patient attended [facility]  Stating she doesn't want to go to the [employer]'s trip because she feels she wasn't given enough time to prepare herself for the trip  Last shower 2/1

## 2020-02-04 NOTE — PROGRESS NOTES
02/04/20 0945   Team Meeting   Meeting Type Tx Team Meeting   Initial Conference Date 02/04/20   Next Conference Date 02/11/20   Team Members Present   Team Members Present Physician;Nurse;; Other (Discipline and Name)   Physician Team Member Dr Carolee Donald Team Member Tal Bingham RN   Care Management Team Member Brenda Ahn   Other (Discipline and Name) Anabela Ji, [facility]; Janeen Frankel, [facility]   Patient/Family Present   Patient Present Yes   Patient's Family Present No     Patient attended treatment team meeting this morning without her self assessment completed  Patient attended 59% of groups offered last week  Patient reports she will not be going on her [employer]'s trip today because she feels she was not given enough notice for her trip  Patient reports she does not want to go out with certain staff members  Team and patient completed risk assessment and the patient did not verbalize any desire to elope from the program  Patient verbalized understanding of consequences of eloping from treatment while on a commitment  Patient verbalized no further questions or concerns at the conclusion of the meeting

## 2020-02-04 NOTE — PLAN OF CARE
Problem: Alteration in Thoughts and Perception  Goal: Verbalize thoughts and feelings  Description  Interventions:  - Promote a nonjudgmental and trusting relationship with the patient through active listening and therapeutic communication  - Assess patient's level of functioning, behavior and potential for risk  - Engage patient in 1 on 1 interactions for a minimum of 15 minutes each session  - Encourage patient to express fears, feelings, frustrations, and discuss symptoms    - [place] patient to reality, help patient recognize reality-based thinking   - Administer medications as ordered and assess for potential side effects  - Provide the patient education related to the signs and symptoms of the illness and desired effects of prescribed medications  Outcome: Progressing  Goal: Agree to be compliant with medication regime, as prescribed and report medication side effects  Description  Interventions:  - Offer appropriate PRN medication and supervise ingestion; conduct aims, as needed   Outcome: Progressing  Goal: Attend and participate in unit activities, including therapeutic, recreational, and educational groups  Description  Interventions:  - Provide therapeutic and educational activities daily, encourage attendance and participation, and document same in the medical record     CERTIFIED PEER SPECIALIST INTERVENTIONS:    Complete peer assessment with patient to assess their needs and identify their goals to complete while in the recovery program as well as once discharged into the community  Patient will complete WRAP Plan, Crisis Plan and [employer]  Patient will attend 50% of groups offered on the unit  Patient will complete a goal card weekly      Outcome: Progressing     Problem: Ineffective Coping  Goal: Participates in unit activities  Description  Interventions:  - Provide therapeutic environment   - Provide required programming   - Redirect inappropriate behaviors   Outcome: Progressing  Goal: Patient/Family verbalizes awareness of resources  Outcome: Progressing  Goal: Understands least restrictive measures  Description  Interventions:  - Utilize least restrictive behavior  Outcome: Progressing     Problem: Risk for Self Injury/Neglect  Goal: Treatment Goal: Remain safe during length of stay, learn and adopt new coping skills, and be free of self-injurious ideation, impulses and acts at the time of discharge  Outcome: Progressing  Goal: Verbalize thoughts and feelings  Description  Interventions:  - Assess and re-assess patient's lethality and potential for self-injury  - Engage patient in 1:1 interactions, daily, for a minimum of 15 minutes  - Encourage patient to express feelings, fears, frustrations, hopes  - Establish rapport/trust with patient   Outcome: Progressing  Goal: Refrain from harming self  Description  Interventions:  - Monitor patient closely, per order  - Develop a trusting relationship  - Supervise medication ingestion, monitor effects and side effects   Outcome: Progressing  Goal: Attend and participate in unit activities, including therapeutic, recreational, and educational groups  Description  Interventions:  - Provide therapeutic and educational activities daily, encourage attendance and participation, and document same in the medical record  - Obtain collateral information, encourage visitation and family involvement in care   Outcome: Progressing     Problem: Depression  Goal: Verbalize thoughts and feelings  Description  Interventions:  - Assess and re-assess patient's level of risk   - Engage patient in 1:1 interactions, daily, for a minimum of 15 minutes   - Encourage patient to express feelings, fears, frustrations, hopes   Outcome: Progressing     Problem: Anxiety  Goal: Anxiety is at manageable level  Description  Interventions:  - Assess and monitor patient's anxiety level     - Monitor for signs and symptoms of anxiety both physical and emotional (heart palpitations, chest pain, shortness of breath, headaches, nausea, feeling jumpy, restlessness, irritable, apprehensive)  - Collaborate with interdisciplinary team and initiate plan and interventions as ordered  - [place] patient to unit/surroundings  - Explain treatment plan  - Encourage participation in care  - Encourage verbalization of concerns/fears  - Identify coping mechanisms  - Assist in developing anxiety-reducing skills  - Administer/offer alternative therapies  - Limit or eliminate stimulants  Outcome: Progressing     Problem: Alteration in Orientation  Goal: Interact with staff daily  Description  Interventions:  - Assess and re-assess patient's level of orientation  - Engage patient in 1 on 1 interactions, daily, for a minimum of 15 minutes   - Establish rapport/trust with patient   Outcome: Progressing     Problem: PAIN - ADULT  Goal: Verbalizes/displays adequate comfort level or baseline comfort level  Description  Interventions:  - Encourage patient to monitor pain and request assistance  - Assess pain using appropriate pain scale  - Administer analgesics based on type and severity of pain and evaluate response  - Implement non-pharmacological measures as appropriate and evaluate response  - Consider cultural and social influences on pain and pain management  - Notify physician/advanced practitioner if interventions unsuccessful or patient reports new pain  Outcome: Progressing     Problem: SAFETY ADULT  Goal: Patient will remain free of falls  Description  INTERVENTIONS:  - Assess patient frequently for physical needs  -  Identify cognitive and physical deficits and behaviors that affect risk of falls    -  [place] fall precautions as indicated by assessment   - Educate patient/family on patient safety including physical limitations  - Instruct patient to call for assistance with activity based on assessment  - Modify environment to reduce risk of injury  - Consider OT/PT consult to assist with strengthening/mobility  Outcome: Progressing     Problem: Nutrition/Hydration-ADULT  Goal: Nutrient/Hydration intake appropriate for improving, restoring or maintaining nutritional needs  Description  Monitor and assess patient's nutrition/hydration status for malnutrition  Collaborate with interdisciplinary team and initiate plan and interventions as ordered  Monitor patient's weight and dietary intake as ordered or per policy  Utilize nutrition screening tool and intervene as necessary  Determine patient's food preferences and provide high-protein, high-caloric foods as appropriate       INTERVENTIONS:  - Monitor oral intake, urinary output, labs, and treatment plans  - Assess nutrition and hydration status and recommend course of action  - Evaluate amount of meals eaten  - Assist patient with eating if necessary   - Allow adequate time for meals  - Recommend/ encourage appropriate diets, oral nutritional supplements, and vitamin/mineral supplements  - Order, calculate, and assess calorie counts as needed  - Recommend, monitor, and adjust tube feedings and TPN/PPN based on assessed needs  - Assess need for intravenous fluids  - Provide specific nutrition/hydration education as appropriate  - Include patient/family/caregiver in decisions related to nutrition  Outcome: Progressing     Problem: Alteration in Thoughts and Perception  Goal: Treatment Goal: Gain control of psychotic behaviors/thinking, reduce/eliminate presenting symptoms and demonstrate improved reality functioning upon discharge  Outcome: Not Progressing  Goal: Recognize dysfunctional thoughts, communicate reality-based thoughts at the time of discharge  Description  Interventions:  - Provide medication and psycho-education to assist patient in compliance and developing insight into his/her illness   Outcome: Not Progressing  Goal: Complete daily ADLs, including personal hygiene independently, as able  Description  Interventions:  - Observe, teach, and assist patient with ADLS  - Monitor and promote a balance of rest/activity, with adequate nutrition and elimination   Outcome: Not Progressing     Problem: Ineffective Coping  Goal: Demonstrates healthy coping skills  Outcome: Not Progressing     Problem: Risk for Self Injury/Neglect  Goal: Recognize maladaptive responses and adopt new coping mechanisms  Outcome: Not Progressing  Goal: Complete daily ADLs, including personal hygiene independently, as able  Description  Interventions:  - Observe, teach, and assist patient with ADLS  - Monitor and promote a balance of rest/activity, with adequate nutrition and elimination  Outcome: Not Progressing     Problem: Depression  Goal: Treatment Goal: Demonstrate behavioral control of depressive symptoms, verbalize feelings of improved mood/affect, and adopt new coping skills prior to discharge  Outcome: Not Progressing  Goal: Refrain from isolation  Description  Interventions:  - Develop a trusting relationship   - Encourage socialization   Outcome: Not Progressing  Goal: Refrain from self-neglect  Outcome: Not Progressing   Mostly isolative to her bed, out for meds and meals, attended IMR group  Ate 100% of breakfast and 25% of lunch  Refused to go off unit with staff on her pass today because she wanted to go somewhere else  At snack time became angry / agitated when staff asked her if she wanted to do her laundry  Accusing staff of always bothering her and being disrespectful to her  Claims she used to be the neighbor of [facility] when in [place] (not true)  Disrespectful of personal boundaries, saying that all staff who work in mental health have have mental health issues  "Aren't you looking for a  for your children?" Another patient intervened and started talking with her and diverted the conversation to something less hostile  Continue to monitor

## 2020-02-04 NOTE — PROGRESS NOTES
[facility] did take part in PM Group, came up for HS medicine (except the Singulair), had HS snack  Wearing now her QHS humidified nasal O2 @ 1L for bed

## 2020-02-05 NOTE — PLAN OF CARE
Problem: Alteration in Thoughts and Perception  Goal: Agree to be compliant with medication regime, as prescribed and report medication side effects  Description  Interventions:  - Offer appropriate PRN medication and supervise ingestion; conduct aims, as needed   Outcome: Progressing     Problem: Risk for Self Injury/Neglect  Goal: Verbalize thoughts and feelings  Description  Interventions:  - Assess and re-assess patient's lethality and potential for self-injury  - Engage patient in 1:1 interactions, daily, for a minimum of 15 minutes  - Encourage patient to express feelings, fears, frustrations, hopes  - Establish rapport/trust with patient   Outcome: Progressing  Goal: Refrain from harming self  Description  Interventions:  - Monitor patient closely, per order  - Develop a trusting relationship  - Supervise medication ingestion, monitor effects and side effects   Outcome: Progressing     Problem: SAFETY ADULT  Goal: Patient will remain free of falls  Description  INTERVENTIONS:  - Assess patient frequently for physical needs  -  Identify cognitive and physical deficits and behaviors that affect risk of falls    -  [place] fall precautions as indicated by assessment   - Educate patient/family on patient safety including physical limitations  - Instruct patient to call for assistance with activity based on assessment  - Modify environment to reduce risk of injury  - Consider OT/PT consult to assist with strengthening/mobility  Outcome: Progressing     Problem: RESPIRATORY - ADULT  Goal: Achieves optimal ventilation and oxygenation  Description  INTERVENTIONS:  - Assess for changes in respiratory status  - Assess for changes in mentation and behavior  - Position to facilitate oxygenation and minimize respiratory effort  - Oxygen administration by appropriate delivery method based on oxygen saturation (per order) or ABGs  - Initiate smoking cessation education as indicated  - Encourage broncho-pulmonary hygiene including cough, deep breathe, Incentive Spirometry  - Assess the need for suctioning and aspirate as needed  - Assess and instruct to report SOB or any respiratory difficulty  - Respiratory Therapy support as indicated  Outcome: Progressing     Problem: Alteration in Thoughts and Perception  Goal: Complete daily ADLs, including personal hygiene independently, as able  Description  Interventions:  - Observe, teach, and assist patient with ADLS  - Monitor and promote a balance of rest/activity, with adequate nutrition and elimination   Outcome: Not Collette Kar has been in her room most of the shift  She sits or lays in her bed most of the time  Good eye contact when conversing  Sits at the phone at times, even when not using it  Likes to yell into the nurses station to talk with staff  Social with staff and select peers  Took medication without swallowing issue  Ate 50% of her meal and drank all of the Ensure  She was reminded by MHT to get her laundry so that peer could do her laundy  She was being defiant  "Not right now  I can't right now " She was sitting alone in her room  This writer then went to speak with her  "I am too full right now and I can't see well to get the clothes out of the washer " She did end up going in a few minutes after being spoken to about it  She then would not get the clothes out of the dryer  MHT had to take her clothes out and they are by the laundry room  Jd Waggoner still has not gotten them  Attended evening group  Took HS medication with prompting  Ate snack  Still did not get her clothing  She was reminded again by MHT to get them  "I can't right now " After a few minutes she did go get her clothing  Oxygen saturation 91% prior to oxygen being applied at 1 liter nasal cannula  Continue to monitor  Precautions maintained

## 2020-02-05 NOTE — PLAN OF CARE
Problem: Individualized Interventions  Goal: Attend and participate in unit activities, including therapeutic, recreational, and educational groups  Description    PSYCHOTHERAPY INTERVENTIONS:    -Form therapeutic alliance to promote trust and safety within a therapeutic environment    -Engage in individual psychotherapy using evidence-based practices that are specific to individual needs   -Process barriers to community living with an emphasis on solution-based interventions   -Promote self-awareness and identity development   -Identify and process patterns of behavior that have led to decompensation and/or hospitalizations   -Practice effective communication with staff, peers, family, and community members  Participate in family sessions as necessary   -Encourage reality-based thought content by examining thought processes and cognitive distortions  Outcome: Progressing    Therapist and patient met individually this morning to discuss her behaviors yesterday, and find alternative solutions for coping with staff she does not like  Patient was redirected in treatment team yesterday from speaking about staff member, and team expressed suspicion that patient does not like staff who set boundaries with her  Nursing report this morning indicates that patient was vocalizing assumptions about staff member's personal life and being generally uncooperative with directives from nursing  Therapist and patient processed what brought this behavior on  Patient believes she and staff member have a pre-existing relationship, both as neighbors and at a previous facility where patient's son was hospitalized  Patient expressed much paranoia about this staff member's intentions, and was reminded that at one point she incorrectly believed that she and this therapist had a dual relationship as well    Patient is resistant to interpersonal problem-solving efforts, consistently speaking about the situation on a personal level, rather than respecting staff-patient boundaries, as advised by therapist   She states "I won't do it again, but I won't apologize either "  Therapist generalized the situation for patient to discuss alternative ways of dealing with "people I dislike" in the community  Therapist helped patient process how making assumptions/accusations will let to interpersonal conflict in the community, and possibly to re-hospitalization  Patient has a history of reporting difficulty with staff in her group home, in situations that sound like boundary-setting  Patient was reminded that she cannot control what others do, but only how she responds  Therapist informed patient that her behavior towards staff yesterday is not appropriate and patient agreed  Therapist will continue to help patient work on positive ways to get her needs met, as well as navigating interpersonal conflict

## 2020-02-05 NOTE — PROGRESS NOTES
02/05/20 0900   Team Meeting   Meeting Type Daily Rounds   Team Members Present   Team Members Present Nurse;; Other (Discipline and Name)   Nursing Team Member Arturo Botello RN   Care Management Team Member Brenda Reyes   Other (Discipline and Name) Madeline Vance LCSW     Patient attended [facility]  Manipulative with staff and defiant  Agitated and angry as well as rude to staff  Refused to go off grounds yesterday  Making inappropriate comments about staff  Slept

## 2020-02-05 NOTE — ASSESSMENT & PLAN NOTE
Patient was somewhat defiant disrespectful to staff and accusatory of the motives refusing to comply with her laundry yesterday but finally did comply after constant reminders by staff  She has yet take a shower even though she knows she has to take showers twice a week as per her behavior plan  She is still becoming psychosomatic preoccupied complaining of shortness of breath and not feeling right off and on  However her vital signs have been stable and she is not short of breath  She she is no longer verbalizing any overt delusions even though she still appears to harbor some paranoia  She is preoccupied suspicious at times and continues to exhibit stilted speech and remains poorly groomed poorly kept but does attend some groups  She was told that she can still going to pass next week provided she take showers the day before and complies with behavior plan    Compliant with medications eating well sleeping well and tolerating clozapine and Zoloft with no major issues  Current medications:    Current Facility-Administered Medications:     acetaminophen (TYLENOL) tablet 325 mg, 325 mg, Oral, Q6H PRN, Zac Sierra MD    acetaminophen (TYLENOL) tablet 650 mg, 650 mg, Oral, Q6H PRN, Zac Sierra MD    acetaminophen (TYLENOL) tablet 650 mg, 650 mg, Oral, Q8H PRN, Zac Sierra MD    albuterol (PROVENTIL HFA,VENTOLIN HFA) inhaler 2 puff, 2 puff, Inhalation, Q4H PRN, Zac Sierra MD, 2 puff at 10/11/19 0424    aluminum-magnesium hydroxide-simethicone (MYLANTA) 200-200-20 mg/5 mL oral suspension 15 mL, 15 mL, Oral, Q4H PRN, Zac Sierra MD, 15 mL at 01/26/20 1021    ammonium lactate (LAC-HYDRIN) 12 % lotion 1 application, 1 application, Topical, BID PRN, Zac Sierra MD    benzonatate (TESSALON PERLES) capsule 100 mg, 100 mg, Oral, TID PRN, Zac Sierra MD    benztropine (COGENTIN) injection 1 mg, 1 mg, Intramuscular, Q8H PRN, Zac Sierra MD    carbamide peroxide (DEBROX) 6 5 % otic solution 5 drop, 5 drop, Left Ear, BID PRN, Nohemy Robbins MD    cloZAPine (CLOZARIL) tablet 25 mg, 25 mg, Oral, BID, Dalton Garner MD, 25 mg at 02/04/20 2103    cloZAPine (CLOZARIL) tablet 50 mg, 50 mg, Oral, BID, Dalton Garner MD, 50 mg at 02/04/20 2104    EPINEPHrine PF (ADRENALIN) 1 mg/mL injection 0 15 mg, 0 15 mg, Intramuscular, Once PRN, Dalton Garner MD    fluticasone-vilanterol (BREO ELLIPTA) 200-25 MCG/INH inhaler 1 puff, 1 puff, Inhalation, Daily, Dalton Garner MD, 1 puff at 02/04/20 0900    ketotifen (ZADITOR) 0 025 % ophthalmic solution 1 drop, 1 drop, Right Eye, BID PRN, Dalton Garner MD    levothyroxine tablet 125 mcg, 125 mcg, Oral, Early Morning, Dalton Garner MD, 125 mcg at 02/05/20 0549    magnesium hydroxide (MILK OF MAGNESIA) 400 mg/5 mL oral suspension 30 mL, 30 mL, Oral, Daily PRN, Dalton Garner MD    montelukast (SINGULAIR) tablet 10 mg, 10 mg, Oral, HS, Dalton Garner MD, 10 mg at 01/16/20 2106    OLANZapine (ZyPREXA) IM injection 5 mg, 5 mg, Intramuscular, Q8H PRN, Dalton Garner MD    OLANZapine (ZyPREXA) tablet 5 mg, 5 mg, Oral, Q8H PRN, Dalton Garner MD    ondansetron (ZOFRAN-ODT) dispersible tablet 4 mg, 4 mg, Oral, Q6H PRN, Dalton Garner MD, 4 mg at 12/09/19 1757    pantoprazole (PROTONIX) EC tablet 40 mg, 40 mg, Oral, Early Morning, Dalton Garner MD, 40 mg at 02/05/20 0549    polyethylene glycol (MIRALAX) packet 17 g, 17 g, Oral, Daily PRN, Dalton Garner MD    polyvinyl alcohol (LIQUIFILM TEARS) 1 4 % ophthalmic solution 1 drop, 1 drop, Both Eyes, Q3H PRN, Dalton Garner MD    sertraline (ZOLOFT) tablet 150 mg, 150 mg, Oral, Daily, Dalton Garner MD, 150 mg at 02/04/20 0902    sucralfate (CARAFATE) oral suspension 1,000 mg, 1,000 mg, Oral, BID, Cat Torres MD, 1,000 mg at 02/04/20 1621    theophylline (JEF-24) 24 hr capsule 200 mg, 200 mg, Oral, Daily, Dalton Garner MD, 200 mg at 02/04/20 0902    tiotropium [facility]) capsule for inhaler 18 mcg, 18 mcg, Inhalation, Daily, Dalton Garner MD, 18 mcg at 02/04/20 0900    traZODone (DESYREL) tablet 25 mg, 25 mg, Oral, HS PRN, Deep Durand MD  Justification if on more than two antipsychotics: not applicable  Side effects if any: none    Risks , benefits, side effects and precautions of medications discussed with patient and reminded patient to let us know any problems with medications     Objective:     Vital Signs:  Vitals:    02/04/20 0700 02/04/20 1500 02/04/20 1900 02/04/20 2152   BP: 108/66 96/66 92/60    BP Location: Right arm Left arm Left arm    Pulse: 79 82 67    Resp: 18 14 14    Temp: 97 6 °F (36 4 °C) 97 6 °F (36 4 °C) 97 5 °F (36 4 °C)    TempSrc:  Temporal Temporal    SpO2:  92% 94% 91%   Weight:       Height:         Appearance:  age appropriate, casually dressed, disheveled and older than stated age poorly groomed but with better eye contact   Behavior:  deviant, evasive and guarded suspicious but polite and friendly at   Speech:  loud and tangential    Mood:  anxious, euphoric and irritable    Affect:  increased in intensity, increased in range, mood-congruent and redirectable most of the time   Thought Process:  circumstantial, concrete, disorganized, goal directed, illogical and perserverative with stilted speech as if talking in a court of law   Thought Content:  delusions  grandiose and persecutory  being accusatory and suspicious of certain staff but does not admit to any overt systematized delusional believes  No current suicidal homicidal thoughts intent or plans reported  No obsessions no preoccupation with violence    Still psycho somatic her about difficulty breathing and not feeling right and having low blood pressure etc   Perceptual Disturbances: None and not visibly responding to internal still   Risk Potential: Inability to care for herself but refusing showers   Sensorium:  person, place, time/date, situation, day of week, month of year, year and time   Cognition:  grossly intact with no language deficit, aware of current events, not a visit in recent or remote min   Consciousness:  alert and awake    Attention: Intent   Intellect: Average   Insight:  Fair   Judgment: fair      Motor Activity: no abnormal movements         Recent Labs:  Results Reviewed     None          I/O Past 24 hours:  No intake/output data recorded  No intake/output data recorded  Assessment / Plan:     Schizoaffective disorder, bipolar type ([address] )      Reason for continued inpatient care: to assess ability to maintain improvement and see how she does on outing with family after another outing with staff escort next week  Acceptance by patient: accepting now  Hopefulness in recovery: living at the [facility] soon  Understanding of medications :  yes  Involved in reintegration process: attending groups and has off grounds and off unit privileges  Trusting in relatoinship with psychiatrist:  Khalif Elliott now    Recommended Treatment:    Medication changes:  1) none today    Non-pharmacological treatments  1) Continue with individual therapy, group therapy, milieu therapy and occupational therapy using recovery principles and psycho-education about accepting illness and need for treatment   2) encourage to take showers twice a week and cooperate with treatment and adl skills as per her behav  plan  30 reschedule another pass with staff to community next week before pass with sister to see if she would fully comply   Safety  1) Safety/communication plan established targeting dynamic risk factors above  Discharge Plan to a [facility] at [address] / Coordination of Care    Total floor / unit time spent today 15 minutes  Greater than 50% of total time was spent with the patient and / or family counseling and / or coordination of care  Receptive to listening and skills for management of symptoms       Patient's Rights, confidentiality and exceptions to confidentiality, use of automated medical record, Jeb Patrick staff access to medical record, and consent to treatment reviewed      Ivonne Nevarez MD

## 2020-02-05 NOTE — PLAN OF CARE
Problem: Alteration in Thoughts and Perception  Goal: Agree to be compliant with medication regime, as prescribed and report medication side effects  Description  Interventions:  - Offer appropriate PRN medication and supervise ingestion; conduct aims, as needed   Outcome: Progressing     Problem: Risk for Self Injury/Neglect  Goal: Refrain from harming self  Description  Interventions:  - Monitor patient closely, per order  - Develop a trusting relationship  - Supervise medication ingestion, monitor effects and side effects   Outcome: Progressing     Problem: Alteration in Orientation  Goal: Interact with staff daily  Description  Interventions:  - Assess and re-assess patient's level of orientation  - Engage patient in 1 on 1 interactions, daily, for a minimum of 15 minutes   - Establish rapport/trust with patient   Outcome: Progressing     Problem: PAIN - ADULT  Goal: Verbalizes/displays adequate comfort level or baseline comfort level  Description  Interventions:  - Encourage patient to monitor pain and request assistance  - Assess pain using appropriate pain scale  - Administer analgesics based on type and severity of pain and evaluate response  - Implement non-pharmacological measures as appropriate and evaluate response  - Consider cultural and social influences on pain and pain management  - Notify physician/advanced practitioner if interventions unsuccessful or patient reports new pain  Outcome: Progressing     Problem: SAFETY ADULT  Goal: Patient will remain free of falls  Description  INTERVENTIONS:  - Assess patient frequently for physical needs  -  Identify cognitive and physical deficits and behaviors that affect risk of falls    -  [place] fall precautions as indicated by assessment   - Educate patient/family on patient safety including physical limitations  - Instruct patient to call for assistance with activity based on assessment  - Modify environment to reduce risk of injury  - Consider OT/PT consult to assist with strengthening/mobility  Outcome: Progressing     Problem: Alteration in Thoughts and Perception  Goal: Attend and participate in unit activities, including therapeutic, recreational, and educational groups  Description  Interventions:  - Provide therapeutic and educational activities daily, encourage attendance and participation, and document same in the medical record     CERTIFIED PEER SPECIALIST INTERVENTIONS:    Complete peer assessment with patient to assess their needs and identify their goals to complete while in the recovery program as well as once discharged into the community  Patient will complete WRAP Plan, Crisis Plan and [facility] [employer]  Patient will attend 50% of groups offered on the unit  Patient will complete a goal card weekly      Outcome: Not Progressing  Goal: Complete daily ADLs, including personal hygiene independently, as able  Description  Interventions:  - Observe, teach, and assist patient with ADLS  - Monitor and promote a balance of rest/activity, with adequate nutrition and elimination   Outcome: Not Progressing     Problem: Ineffective Coping  Goal: Participates in unit activities  Description  Interventions:  - Provide therapeutic environment   - Provide required programming   - Redirect inappropriate behaviors   Outcome: Not Progressing     Problem: Risk for Self Injury/Neglect  Goal: Attend and participate in unit activities, including therapeutic, recreational, and educational groups  Description  Interventions:  - Provide therapeutic and educational activities daily, encourage attendance and participation, and document same in the medical record  - Obtain collateral information, encourage visitation and family involvement in care   Outcome: Not Progressing  Goal: Complete daily ADLs, including personal hygiene independently, as able  Description  Interventions:  - Observe, teach, and assist patient with ADLS  - Monitor and promote a balance of rest/activity, with adequate nutrition and elimination  Outcome: Not Progressing     Problem: Depression  Goal: Treatment Goal: Demonstrate behavioral control of depressive symptoms, verbalize feelings of improved mood/affect, and adopt new coping skills prior to discharge  Outcome: Not Progressing  Goal: Refrain from isolation  Description  Interventions:  - Develop a trusting relationship   - Encourage socialization   Outcome: Not Progressing  Goal: Refrain from self-neglect  Outcome: Not Progressing     Problem: Nutrition/Hydration-ADULT  Goal: Nutrient/Hydration intake appropriate for improving, restoring or maintaining nutritional needs  Description  Monitor and assess patient's nutrition/hydration status for malnutrition  Collaborate with interdisciplinary team and initiate plan and interventions as ordered  Monitor patient's weight and dietary intake as ordered or per policy  Utilize nutrition screening tool and intervene as necessary  Determine patient's food preferences and provide high-protein, high-caloric foods as appropriate  INTERVENTIONS:  - Monitor oral intake, urinary output, labs, and treatment plans  - Assess nutrition and hydration status and recommend course of action  - Evaluate amount of meals eaten  - Assist patient with eating if necessary   - Allow adequate time for meals  - Recommend/ encourage appropriate diets, oral nutritional supplements, and vitamin/mineral supplements  - Order, calculate, and assess calorie counts as needed  - Recommend, monitor, and adjust tube feedings and TPN/PPN based on assessed needs  - Assess need for intravenous fluids  - Provide specific nutrition/hydration education as appropriate  - Include patient/family/caregiver in decisions related to nutrition  Outcome: Not Progressing   Mostly isolative to her bed, out for meds and meals, refused all groups even with prompting from staff  Ate 25% of breakfast and 10% of lunch   Did not shower or do hygiene (last shower was on 2/1) and appears disheveled  No somatic complaints voiced or issues noted  Continue to monitor

## 2020-02-05 NOTE — PROGRESS NOTES
Progress Note - Felicitas Alu 1955, 60 y o  female MRN: [id number]    Unit/Bed#: [facility] 110-02 Encounter: [id number]    Primary Care Provider: Reggie Berry PA-C   Date and time admitted to hospital: 7/23/2019  5:30 PM        * Schizoaffective disorder, bipolar type [facility])  Assessment & Plan  Patient was somewhat defiant disrespectful to staff and accusatory of the motives refusing to comply with her laundry yesterday but finally did comply after constant reminders by staff  She has yet take a shower even though she knows she has to take showers twice a week as per her behavior plan  She is still becoming psychosomatic preoccupied complaining of shortness of breath and not feeling right off and on  However her vital signs have been stable and she is not short of breath  She she is no longer verbalizing any overt delusions even though she still appears to harbor some paranoia  She is preoccupied suspicious at times and continues to exhibit stilted speech and remains poorly groomed poorly kept but does attend some groups  She was told that she can still going to pass next week provided she take showers the day before and complies with behavior plan  Compliant with medications eating well sleeping well and tolerating clozapine and Zoloft with no major issues  No showers since last weekend and she claims he will take 1 tonight    Current medications:    Current Facility-Administered Medications:     acetaminophen (TYLENOL) tablet 325 mg, 325 mg, Oral, Q6H PRN, Jerome Lopez MD    acetaminophen (TYLENOL) tablet 650 mg, 650 mg, Oral, Q6H PRN, Jerome Lopez MD    acetaminophen (TYLENOL) tablet 650 mg, 650 mg, Oral, Q8H PRN, Jerome Lopez MD    albuterol (PROVENTIL HFA,VENTOLIN HFA) inhaler 2 puff, 2 puff, Inhalation, Q4H PRN, Jerome Lopez MD, 2 puff at 10/11/19 0424    aluminum-magnesium hydroxide-simethicone (MYLANTA) 200-200-20 mg/5 mL oral suspension 15 mL, 15 mL, Oral, Q4H PRN, Jerome Lopez MD, 15 mL at 01/26/20 1021    ammonium lactate (LAC-HYDRIN) 12 % lotion 1 application, 1 application, Topical, BID PRN, Jill Gayle MD    benzonatate (TESSALON PERLES) capsule 100 mg, 100 mg, Oral, TID PRN, Jill Gayle MD    benztropine (COGENTIN) injection 1 mg, 1 mg, Intramuscular, Q8H PRN, Jill Gayle MD    carbamide peroxide (DEBROX) 6 5 % otic solution 5 drop, 5 drop, Left Ear, BID PRN, Amanda Bryan MD    cloZAPine (CLOZARIL) tablet 25 mg, 25 mg, Oral, BID, Jill Gayle MD, 25 mg at 02/04/20 2103    cloZAPine (CLOZARIL) tablet 50 mg, 50 mg, Oral, BID, Jill Gayle MD, 50 mg at 02/04/20 2104    EPINEPHrine PF (ADRENALIN) 1 mg/mL injection 0 15 mg, 0 15 mg, Intramuscular, Once PRN, Jill Gayle MD    fluticasone-vilanterol (BREO ELLIPTA) 200-25 MCG/INH inhaler 1 puff, 1 puff, Inhalation, Daily, Jill Gayle MD, 1 puff at 02/04/20 0900    ketotifen (ZADITOR) 0 025 % ophthalmic solution 1 drop, 1 drop, Right Eye, BID PRN, Jill Gayle MD    levothyroxine tablet 125 mcg, 125 mcg, Oral, Early Morning, Jill Gayle MD, 125 mcg at 02/05/20 0549    magnesium hydroxide (MILK OF MAGNESIA) 400 mg/5 mL oral suspension 30 mL, 30 mL, Oral, Daily PRN, Jill Gayle MD    montelukast (SINGULAIR) tablet 10 mg, 10 mg, Oral, HS, Jill Gayle MD, 10 mg at 01/16/20 2106    OLANZapine (ZyPREXA) IM injection 5 mg, 5 mg, Intramuscular, Q8H PRN, Jill Gayle MD    OLANZapine (ZyPREXA) tablet 5 mg, 5 mg, Oral, Q8H PRN, Jill Gayle MD    ondansetron (ZOFRAN-ODT) dispersible tablet 4 mg, 4 mg, Oral, Q6H PRN, Jill Gayle MD, 4 mg at 12/09/19 1757    pantoprazole (PROTONIX) EC tablet 40 mg, 40 mg, Oral, Early Morning, Jill Gayle MD, 40 mg at 02/05/20 0549    polyethylene glycol (MIRALAX) packet 17 g, 17 g, Oral, Daily PRN, Jill Gayle MD    polyvinyl alcohol (LIQUIFILM TEARS) 1 4 % ophthalmic solution 1 drop, 1 drop, Both Eyes, Q3H PRN, Jill Gayle MD    sertraline (ZOLOFT) tablet 150 mg, 150 mg, Oral, Daily, Jill Gayle MD, 150 mg at 02/04/20 0902    sucralfate (CARAFATE) oral suspension 1,000 mg, 1,000 mg, Oral, BID, Sera Roth MD, 1,000 mg at 02/04/20 1621    theophylline (JEF-24) 24 hr capsule 200 mg, 200 mg, Oral, Daily, Christian Bennett MD, 200 mg at 02/04/20 0902    tiotropium [facility]) capsule for inhaler 18 mcg, 18 mcg, Inhalation, Daily, Christian Bennett MD, 18 mcg at 02/04/20 0900    traZODone (DESYREL) tablet 25 mg, 25 mg, Oral, HS PRN, Christian Bennett MD  Justification if on more than two antipsychotics: not applicable  Side effects if any: none    Risks , benefits, side effects and precautions of medications discussed with patient and reminded patient to let us know any problems with medications     Objective:     Vital Signs:  Vitals:    02/04/20 0700 02/04/20 1500 02/04/20 1900 02/04/20 2152   BP: 108/66 96/66 92/60    BP Location: Right arm Left arm Left arm    Pulse: 79 82 67    Resp: 18 14 14    Temp: 97 6 °F (36 4 °C) 97 6 °F (36 4 °C) 97 5 °F (36 4 °C)    TempSrc:  Temporal Temporal    SpO2:  92% 94% 91%   Weight:       Height:         Appearance:  age appropriate, casually dressed, disheveled and older than stated age poorly groomed but with better eye contact   Behavior:  deviant, evasive and guarded suspicious but polite and friendly at   Speech:  loud and tangential    Mood:  anxious, euphoric and irritable    Affect:  increased in intensity, increased in range, mood-congruent and redirectable most of the time   Thought Process:  circumstantial, concrete, disorganized, goal directed, illogical and perserverative with stilted speech as if talking in a court of law   Thought Content:  delusions  grandiose and persecutory  being accusatory and suspicious of certain staff but does not admit to any overt systematized delusional believes  No current suicidal homicidal thoughts intent or plans reported  No obsessions no preoccupation with violence    Still psycho somatic her about difficulty breathing and not feeling right and having low blood pressure etc   Perceptual Disturbances: None and not visibly responding to internal still   Risk Potential: Inability to care for herself but refusing showers   Sensorium:  person, place, time/date, situation, day of week, month of year, year and time   Cognition:  grossly intact with no language deficit, aware of current events, not a visit in recent or remote min   Consciousness:  alert and awake    Attention: Intent   Intellect: Average   Insight:  Fair   Judgment: fair      Motor Activity: no abnormal movements         Recent Labs:  Results Reviewed     None          I/O Past 24 hours:  No intake/output data recorded  No intake/output data recorded  Assessment / Plan:     Schizoaffective disorder, bipolar type ([address] )      Reason for continued inpatient care: to assess ability to maintain improvement and see how she does on outing with family after another outing with staff escort next week  Acceptance by patient: accepting now  Hopefulness in recovery: living at the [facility] soon  Understanding of medications :  yes  Involved in reintegration process: attending groups and has off grounds and off unit privileges  Trusting in relatoinship with psychiatrist:  Emiliano Marcano now    Recommended Treatment:    Medication changes:  1) none today    Non-pharmacological treatments  1) Continue with individual therapy, group therapy, milieu therapy and occupational therapy using recovery principles and psycho-education about accepting illness and need for treatment   2) encourage to take showers twice a week and cooperate with treatment and adl skills as per her behav  plan  30 reschedule another pass with staff to community next week before pass with sister to see if she would fully comply   Safety  1) Safety/communication plan established targeting dynamic risk factors above    Discharge Plan to a [facility] at [address]  / Coordination of Care    Total floor / unit time spent today 15 minutes  Greater than 50% of total time was spent with the patient and / or family counseling and / or coordination of care  Receptive to listening and skills for management of symptoms  Patient's Rights, confidentiality and exceptions to confidentiality, use of automated medical record, [facility] staff access to medical record, and consent to treatment reviewed      Deedee Muir MD

## 2020-02-05 NOTE — PLAN OF CARE
Problem: PAIN - ADULT  Goal: Verbalizes/displays adequate comfort level or baseline comfort level  Description  Interventions:  - Encourage patient to monitor pain and request assistance  - Assess pain using appropriate pain scale  - Administer analgesics based on type and severity of pain and evaluate response  - Implement non-pharmacological measures as appropriate and evaluate response  - Consider cultural and social influences on pain and pain management  - Notify physician/advanced practitioner if interventions unsuccessful or patient reports new pain  Outcome: Progressing     Problem: SAFETY ADULT  Goal: Patient will remain free of falls  Description  INTERVENTIONS:  - Assess patient frequently for physical needs  -  Identify cognitive and physical deficits and behaviors that affect risk of falls    -  [place] fall precautions as indicated by assessment   - Educate patient/family on patient safety including physical limitations  - Instruct patient to call for assistance with activity based on assessment  - Modify environment to reduce risk of injury  - Consider OT/PT consult to assist with strengthening/mobility  Outcome: Progressing     Problem: SLEEP DISTURBANCE  Goal: Will exhibit normal sleeping pattern  Description  Interventions:  -  Assess the patients sleep pattern, noting recent changes  - Administer medication as ordered  - Decrease environmental stimuli, including noise, as appropriate during the night  - Encourage the patient to actively participate in unit groups and or exercise during the day to enhance ability to achieve adequate sleep at night  - Assess the patient, in the morning, encouraging a description of sleep experience  Outcome: Progressing    Maggie maintained on ongoing fall and SAFE precaution  [facility] in bed with eyes closed, breath even and unlabored   On O2 with humidifier @1L/m via nasal cannula   Q 15 minutes rounding   No somatic complaint overnight  No PRN needed for sleep aid   No indication of pain or discomfort   No respiratory distress   Will continue to monitor

## 2020-02-06 NOTE — PROGRESS NOTES
Progress Note - Lizbeth Jhaveri 1955, 60 y o  female MRN: [id number]    Unit/Bed#: [facility] 110-02 Encounter: [id number]    Primary Care Provider: Alonso Carmona PA-C   Date and time admitted to hospital: 7/23/2019  5:30 PM        * Schizoaffective disorder, bipolar type [facility])  Assessment & Plan  Patient was eager to let me know that she took a shower finally yesterday after 5 days  She has been compliant with medications but still become psychosomatic claiming she feels tired cannot breathe or eat well but has been attending groups  She has not been in any acute physical distress despite her complaints  He she was also happy that her sister did visit with her on the unit yesterday and she is hoping to go on a pass with her soon  She has been compliant with medications and is still harboring some underlying paranoia and continues to talk in a stilted manner  as usual   No overt delusions elicited but she periodically becomes accusatory and suspicious about staff and sits at the chair by the patient phone both and tries to listen to conversations by nursing at the nurse's station and has to be redirected      Current medications:    Current Facility-Administered Medications:     acetaminophen (TYLENOL) tablet 325 mg, 325 mg, Oral, Q6H PRN, Sarah Hanna MD    acetaminophen (TYLENOL) tablet 650 mg, 650 mg, Oral, Q6H PRN, Sarah Hanna MD    acetaminophen (TYLENOL) tablet 650 mg, 650 mg, Oral, Q8H PRN, Sarah Hanna MD    albuterol (PROVENTIL HFA,VENTOLIN HFA) inhaler 2 puff, 2 puff, Inhalation, Q4H PRN, Sarah Hanna MD, 2 puff at 10/11/19 0424    aluminum-magnesium hydroxide-simethicone (MYLANTA) 200-200-20 mg/5 mL oral suspension 15 mL, 15 mL, Oral, Q4H PRN, Sarah Hanna MD, 15 mL at 01/26/20 1021    ammonium lactate (LAC-HYDRIN) 12 % lotion 1 application, 1 application, Topical, BID PRN, Sarah Hanna MD    benzonatate (TESSALON PERLES) capsule 100 mg, 100 mg, Oral, TID PRN, MD Carla Ybarra benztropine (COGENTIN) injection 1 mg, 1 mg, Intramuscular, Q8H PRN, Ivonne Nevarez MD    carbamide peroxide (DEBROX) 6 5 % otic solution 5 drop, 5 drop, Left Ear, BID PRN, Sage Gavin MD    cloZAPine (CLOZARIL) tablet 25 mg, 25 mg, Oral, BID, Ivonne Nevarez MD, 25 mg at 02/05/20 2143    cloZAPine (CLOZARIL) tablet 50 mg, 50 mg, Oral, BID, Ivonne Nevarez MD, 50 mg at 02/05/20 2143    EPINEPHrine PF (ADRENALIN) 1 mg/mL injection 0 15 mg, 0 15 mg, Intramuscular, Once PRN, Ivonne Nevarez MD    fluticasone-vilanterol (BREO ELLIPTA) 200-25 MCG/INH inhaler 1 puff, 1 puff, Inhalation, Daily, Ivonne Nevarez MD, 1 puff at 02/05/20 0842    ketotifen (ZADITOR) 0 025 % ophthalmic solution 1 drop, 1 drop, Right Eye, BID PRN, Ivonne Nevarez MD    levothyroxine tablet 125 mcg, 125 mcg, Oral, Early Morning, Ivonne Nevarez MD, 125 mcg at 02/06/20 0603    magnesium hydroxide (MILK OF MAGNESIA) 400 mg/5 mL oral suspension 30 mL, 30 mL, Oral, Daily PRN, Ivonne Nevarez MD    montelukast (SINGULAIR) tablet 10 mg, 10 mg, Oral, HS, Ivonne Nevarez MD, 10 mg at 01/16/20 2106    OLANZapine (ZyPREXA) IM injection 5 mg, 5 mg, Intramuscular, Q8H PRN, Ivonne Nevarez MD    OLANZapine (ZyPREXA) tablet 5 mg, 5 mg, Oral, Q8H PRN, Ivonne Nevarez MD    ondansetron (ZOFRAN-ODT) dispersible tablet 4 mg, 4 mg, Oral, Q6H PRN, Ivonne Nevarez MD, 4 mg at 12/09/19 1757    pantoprazole (PROTONIX) EC tablet 40 mg, 40 mg, Oral, Early Morning, Ivonne Nevarez MD, 40 mg at 02/06/20 0603    polyethylene glycol (MIRALAX) packet 17 g, 17 g, Oral, Daily PRN, Ivonne Nevarez MD    polyvinyl alcohol (LIQUIFILM TEARS) 1 4 % ophthalmic solution 1 drop, 1 drop, Both Eyes, Q3H PRN, Ivonne Nevarez MD    sertraline (ZOLOFT) tablet 150 mg, 150 mg, Oral, Daily, Ivonne Nevarez MD, 150 mg at 02/05/20 0843    sucralfate (CARAFATE) oral suspension 1,000 mg, 1,000 mg, Oral, BID, Cat Torres MD, 1,000 mg at 02/06/20 0603    theophylline (JEF-24) 24 hr capsule 200 mg, 200 mg, Oral, Daily, Sarah Hanna MD, 200 mg at 02/05/20 0843    tiotropium [facility]) capsule for inhaler 18 mcg, 18 mcg, Inhalation, Daily, Sarah Hanna MD, 18 mcg at 02/05/20 4375    traZODone (DESYREL) tablet 25 mg, 25 mg, Oral, HS PRN, Sarah Hanna MD  Justification if on more than two antipsychotics: not applicable  Side effects if any: none    Risks , benefits, side effects and precautions of medications discussed with patient and reminded patient to let us know any problems with medications     Objective:     Vital Signs:  Vitals:    02/05/20 0700 02/05/20 0705 02/05/20 1600 02/05/20 2000   BP: 107/61 (!) 85/55 102/78 98/60   BP Location: Left arm Left arm Right arm Right arm   Pulse: 79 65 95 100   Resp: 18 18 16 20   Temp: 97 8 °F (36 6 °C)  (!) 97 1 °F (36 2 °C) 97 5 °F (36 4 °C)   TempSrc: Temporal  Temporal Temporal   SpO2:   90% 93%   Weight:       Height:         Appearance:  age appropriate, casually dressed and older than stated age better groomed with good eye contact with hair combed   Behavior:  deviant, evasive and guarded polite friendly but still somewhat suspicious at times   Speech:  loud and tangential    Mood:  anxious, euphoric and irritable    Affect:  increased in intensity, increased in range, mood-congruent and redirectable   Thought Process:  circumstantial, concrete, disorganized, goal directed, illogical and perserverative with tendency to have stilted speech as if talking in a court of low being very formal in her speech   Thought Content:  delusions  grandiose and persecutory  continues to be accusatory in suspicious of certain staff claiming that time were with her oxygen concentrator and inhalant off and on  No current suicidal homicidal thoughts intent or plans verbalized  No preoccupation with violence  Still with psychosomatic symptoms off and on    No observed   Perceptual Disturbances: None not visibly responding to internal stimuli   Risk Potential: Inability to care for herself and refusal to take showers refer   Sensorium:  person, place, time/date, situation, day of week, month of year, year and time   Cognition:  grossly intact with no deficit in recent or remote memory, art language deficits, aware of current events   Consciousness:  alert and awake    Attention: intact   Intellect: At least average   Insight:  Fair   Judgment: fair      Motor Activity: no abnormal movements         Recent Labs:  Results Reviewed     None          I/O Past 24 hours:  No intake/output data recorded  No intake/output data recorded  Assessment / Plan:     Schizoaffective disorder, bipolar type ([facility] [address] )      Reason for continued inpatient care: to assess ability to maintain improvement by attending to ADLs and taking showers regular and see how she does on outing with family after another outing with staff escort next week  Acceptance by patient: accepting now  Hopefulness in recovery: living at the [facility]  Understanding of medications :  yes  Involved in reintegration process: attending groups and has off grounds and off unit privileges  Trusting in relatoinship with psychiatrist:  Reva Altamirano now    Recommended Treatment:    Medication changes:  1) none today    Non-pharmacological treatments  1) Continue with individual therapy, group therapy, milieu therapy and occupational therapy using recovery principles and psycho-education about accepting illness and need for treatment   2) encourage to take showers twice a week and cooperate with treatment and adl skills as per her behav  plan  3) reschedule another pass with staff to community next week before pass with sister to see if she would fully comply   Safety  1) Safety/communication plan established targeting dynamic risk factors above  Discharge Plan to a [facility] at [address] / Coordination of Care    Total floor / unit time spent today 15 minutes   Greater than 50% of total time was spent with the patient and / or family counseling and / or coordination of care  Receptive to listening and learning coping skills for management of symptoms  Patient's Rights, confidentiality and exceptions to confidentiality, use of automated medical record, [facility] staff access to medical record, and consent to treatment reviewed      Roberto Shankar MD

## 2020-02-06 NOTE — PROGRESS NOTES
02/05/20 1400   Activity/Group Checklist   Group Other (Comment)  (Recovery Workshop - [facility] group)   Attendance Did not attend     Patient was encouraged to attend group, but declined

## 2020-02-06 NOTE — PLAN OF CARE
Problem: Alteration in Thoughts and Perception  Goal: Agree to be compliant with medication regime, as prescribed and report medication side effects  Description  Interventions:  - Offer appropriate PRN medication and supervise ingestion; conduct aims, as needed   Outcome: Progressing  Goal: Attend and participate in unit activities, including therapeutic, recreational, and educational groups  Description  Interventions:  - Provide therapeutic and educational activities daily, encourage attendance and participation, and document same in the medical record     CERTIFIED PEER SPECIALIST INTERVENTIONS:    Complete peer assessment with patient to assess their needs and identify their goals to complete while in the recovery program as well as once discharged into the community  Patient will complete WRAP Plan, Crisis Plan and [facility]  Patient will attend 50% of groups offered on the unit  Patient will complete a goal card weekly      Outcome: Progressing     Problem: Ineffective Coping  Goal: Participates in unit activities  Description  Interventions:  - Provide therapeutic environment   - Provide required programming   - Redirect inappropriate behaviors   Outcome: Progressing  Goal: Patient/Family verbalizes awareness of resources  Outcome: Progressing  Goal: Understands least restrictive measures  Description  Interventions:  - Utilize least restrictive behavior  Outcome: Progressing     Problem: Risk for Self Injury/Neglect  Goal: Treatment Goal: Remain safe during length of stay, learn and adopt new coping skills, and be free of self-injurious ideation, impulses and acts at the time of discharge  Outcome: Progressing  Goal: Verbalize thoughts and feelings  Description  Interventions:  - Assess and re-assess patient's lethality and potential for self-injury  - Engage patient in 1:1 interactions, daily, for a minimum of 15 minutes  - Encourage patient to express feelings, fears, frustrations, hopes  - Establish rapport/trust with patient   Outcome: Progressing  Goal: Refrain from harming self  Description  Interventions:  - Monitor patient closely, per order  - Develop a trusting relationship  - Supervise medication ingestion, monitor effects and side effects   Outcome: Progressing  Goal: Attend and participate in unit activities, including therapeutic, recreational, and educational groups  Description  Interventions:  - Provide therapeutic and educational activities daily, encourage attendance and participation, and document same in the medical record  - Obtain collateral information, encourage visitation and family involvement in care   Outcome: Progressing     Problem: Anxiety  Goal: Anxiety is at manageable level  Description  Interventions:  - Assess and monitor patient's anxiety level  - Monitor for signs and symptoms of anxiety both physical and emotional (heart palpitations, chest pain, shortness of breath, headaches, nausea, feeling jumpy, restlessness, irritable, apprehensive)  - Collaborate with interdisciplinary team and initiate plan and interventions as ordered    - [place] patient to unit/surroundings  - Explain treatment plan  - Encourage participation in care  - Encourage verbalization of concerns/fears  - Identify coping mechanisms  - Assist in developing anxiety-reducing skills  - Administer/offer alternative therapies  - Limit or eliminate stimulants  Outcome: Progressing     Problem: Alteration in Orientation  Goal: Interact with staff daily  Description  Interventions:  - Assess and re-assess patient's level of orientation  - Engage patient in 1 on 1 interactions, daily, for a minimum of 15 minutes   - Establish rapport/trust with patient   Outcome: Progressing     Problem: PAIN - ADULT  Goal: Verbalizes/displays adequate comfort level or baseline comfort level  Description  Interventions:  - Encourage patient to monitor pain and request assistance  - Assess pain using appropriate pain scale  - Administer analgesics based on type and severity of pain and evaluate response  - Implement non-pharmacological measures as appropriate and evaluate response  - Consider cultural and social influences on pain and pain management  - Notify physician/advanced practitioner if interventions unsuccessful or patient reports new pain  Outcome: Progressing     Problem: SAFETY ADULT  Goal: Patient will remain free of falls  Description  INTERVENTIONS:  - Assess patient frequently for physical needs  -  Identify cognitive and physical deficits and behaviors that affect risk of falls  -  [place] fall precautions as indicated by assessment   - Educate patient/family on patient safety including physical limitations  - Instruct patient to call for assistance with activity based on assessment  - Modify environment to reduce risk of injury  - Consider OT/PT consult to assist with strengthening/mobility  Outcome: Progressing   Mostly isolative to her bed, out for meds and meals, attended [address] [employer] group  Ate 25% of breakfast and 10% of lunch  No somatic complaints voiced or issues noted  Continue to monitor

## 2020-02-06 NOTE — PROGRESS NOTES
[address] [facility] went to bed after group, did not have HS snack & needed multiple prompts to come out for HS medicine  "I'm really tired tonight " Wearing now her QHS humidified nasal O2 @ 1L for bed

## 2020-02-06 NOTE — ASSESSMENT & PLAN NOTE
Patient was eager to let me know that she took a shower finally yesterday after 5 days  She has been compliant with medications but still become psychosomatic claiming she feels tired cannot breathe or eat well but has been attending groups  She has not been in any acute physical distress despite her complaints  He she was also happy that her sister did visit with her on the unit yesterday and she is hoping to go on a pass with her soon  She has been compliant with medications and is still harboring some underlying paranoia and continues to talk in a stilted manner  as usual   No overt delusions elicited but she periodically becomes accusatory and suspicious about staff and sits at the chair by the patient phone both and tries to listen to conversations by nursing at the nurse's station and has to be redirected      Current medications:    Current Facility-Administered Medications:     acetaminophen (TYLENOL) tablet 325 mg, 325 mg, Oral, Q6H PRN, Dalton Garner MD    acetaminophen (TYLENOL) tablet 650 mg, 650 mg, Oral, Q6H PRN, Dalton Garner MD    acetaminophen (TYLENOL) tablet 650 mg, 650 mg, Oral, Q8H PRN, Dalton Garner MD    albuterol (PROVENTIL HFA,VENTOLIN HFA) inhaler 2 puff, 2 puff, Inhalation, Q4H PRN, Dalton Garner MD, 2 puff at 10/11/19 0424    aluminum-magnesium hydroxide-simethicone (MYLANTA) 200-200-20 mg/5 mL oral suspension 15 mL, 15 mL, Oral, Q4H PRN, Dalton Garner MD, 15 mL at 01/26/20 1021    ammonium lactate (LAC-HYDRIN) 12 % lotion 1 application, 1 application, Topical, BID PRN, Dalton Garner MD    benzonatate (TESSALON PERLES) capsule 100 mg, 100 mg, Oral, TID PRN, Dalton Garner MD    benztropine (COGENTIN) injection 1 mg, 1 mg, Intramuscular, Q8H PRN, Dalton Garner MD    carbamide peroxide (DEBROX) 6 5 % otic solution 5 drop, 5 drop, Left Ear, BID PRN, Larmaria del rosario Robbins MD    cloZAPine (CLOZARIL) tablet 25 mg, 25 mg, Oral, BID, Dalton Garner MD, 25 mg at 02/05/20 2148    cloZAPine (CLOZARIL) tablet 50 mg, 50 mg, Oral, BID, Prakash Brewster MD, 50 mg at 02/05/20 2143    EPINEPHrine PF (ADRENALIN) 1 mg/mL injection 0 15 mg, 0 15 mg, Intramuscular, Once PRN, Prakash Brewster MD    fluticasone-vilanterol (BREO ELLIPTA) 200-25 MCG/INH inhaler 1 puff, 1 puff, Inhalation, Daily, Prakash Brewster MD, 1 puff at 02/05/20 0842    ketotifen (ZADITOR) 0 025 % ophthalmic solution 1 drop, 1 drop, Right Eye, BID PRN, Prakash Brewster MD    levothyroxine tablet 125 mcg, 125 mcg, Oral, Early Morning, Prakash Brewster MD, 125 mcg at 02/06/20 0603    magnesium hydroxide (MILK OF MAGNESIA) 400 mg/5 mL oral suspension 30 mL, 30 mL, Oral, Daily PRN, Prakash Brewster MD    montelukast (SINGULAIR) tablet 10 mg, 10 mg, Oral, HS, Prakash Brewster MD, 10 mg at 01/16/20 2106    OLANZapine (ZyPREXA) IM injection 5 mg, 5 mg, Intramuscular, Q8H PRN, Prakash Brewster MD    OLANZapine (ZyPREXA) tablet 5 mg, 5 mg, Oral, Q8H PRN, Prakash Brewster MD    ondansetron (ZOFRAN-ODT) dispersible tablet 4 mg, 4 mg, Oral, Q6H PRN, Prakash Brewster MD, 4 mg at 12/09/19 1757    pantoprazole (PROTONIX) EC tablet 40 mg, 40 mg, Oral, Early Morning, Prakash Brewster MD, 40 mg at 02/06/20 0603    polyethylene glycol (MIRALAX) packet 17 g, 17 g, Oral, Daily PRN, Prakash Brewster MD    polyvinyl alcohol (LIQUIFILM TEARS) 1 4 % ophthalmic solution 1 drop, 1 drop, Both Eyes, Q3H PRN, Prakash Brewster MD    sertraline (ZOLOFT) tablet 150 mg, 150 mg, Oral, Daily, Prakash Brewster MD, 150 mg at 02/05/20 0843    sucralfate (CARAFATE) oral suspension 1,000 mg, 1,000 mg, Oral, BID, Cat Torres MD, 1,000 mg at 02/06/20 0603    theophylline (JEF-24) 24 hr capsule 200 mg, 200 mg, Oral, Daily, Prakash Brewster MD, 200 mg at 02/05/20 0843    tiotropium [facility]) capsule for inhaler 18 mcg, 18 mcg, Inhalation, Daily, Prakash Brewster MD, 18 mcg at 02/05/20 7514    traZODone (DESYREL) tablet 25 mg, 25 mg, Oral, HS PRN, Prakash Brewster MD  Justification if on more than two antipsychotics: not applicable  Side effects if any: none    Risks , benefits, side effects and precautions of medications discussed with patient and reminded patient to let us know any problems with medications     Objective:     Vital Signs:  Vitals:    02/05/20 0700 02/05/20 0705 02/05/20 1600 02/05/20 2000   BP: 107/61 (!) 85/55 102/78 98/60   BP Location: Left arm Left arm Right arm Right arm   Pulse: 79 65 95 100   Resp: 18 18 16 20   Temp: 97 8 °F (36 6 °C)  (!) 97 1 °F (36 2 °C) 97 5 °F (36 4 °C)   TempSrc: Temporal  Temporal Temporal   SpO2:   90% 93%   Weight:       Height:         Appearance:  age appropriate, casually dressed and older than stated age better groomed with good eye contact with hair combed   Behavior:  deviant, evasive and guarded polite friendly but still somewhat suspicious at times   Speech:  loud and tangential    Mood:  anxious, euphoric and irritable    Affect:  increased in intensity, increased in range, mood-congruent and redirectable   Thought Process:  circumstantial, concrete, disorganized, goal directed, illogical and perserverative with tendency to have stilted speech as if talking in a court of low being very formal in her speech   Thought Content:  delusions  grandiose and persecutory  continues to be accusatory in suspicious of certain staff claiming that time were with her oxygen concentrator and inhalant off and on  No current suicidal homicidal thoughts intent or plans verbalized  No preoccupation with violence  Still with psychosomatic symptoms off and on  No observed   Perceptual Disturbances: None not visibly responding to internal stimuli   Risk Potential: Inability to care for herself and refusal to take showers refer   Sensorium:  person, place, time/date, situation, day of week, month of year, year and time   Cognition:  grossly intact with no deficit in recent or remote memory, art language deficits, aware of current events   Consciousness:  alert and awake    Attention: intact   Intellect:  At least average   Insight:  Fair   Judgment: fair      Motor Activity: no abnormal movements         Recent Labs:  Results Reviewed     None          I/O Past 24 hours:  No intake/output data recorded  No intake/output data recorded  Assessment / Plan:     Schizoaffective disorder, bipolar type ([address] )      Reason for continued inpatient care: to assess ability to maintain improvement by attending to ADLs and taking showers regular and see how she does on outing with family after another outing with staff escort next week  Acceptance by patient: accepting now  Hopefulness in recovery: living at the [facility] soon  Understanding of medications :  yes  Involved in reintegration process: attending groups and has off grounds and off unit privileges  Trusting in relatoinship with psychiatrist:  Isabella Bai now    Recommended Treatment:    Medication changes:  1) none today    Non-pharmacological treatments  1) Continue with individual therapy, group therapy, milieu therapy and occupational therapy using recovery principles and psycho-education about accepting illness and need for treatment   2) encourage to take showers twice a week and cooperate with treatment and adl skills as per her behav  plan  3) reschedule another pass with staff to community next week before pass with sister to see if she would fully comply   Safety  1) Safety/communication plan established targeting dynamic risk factors above  Discharge Plan to a [facility] at [address] / Coordination of Care    Total floor / unit time spent today 15 minutes  Greater than 50% of total time was spent with the patient and / or family counseling and / or coordination of care  Receptive to listening and learning coping skills for management of symptoms  Patient's Rights, confidentiality and exceptions to confidentiality, use of automated medical record, Jeb Patrick staff access to medical record, and consent to treatment reviewed      Dawson Murguia Dirk Carey MD

## 2020-02-06 NOTE — PROGRESS NOTES
02/05/20 1500   Activity/Group Checklist   Group Other (Comment)  (Relaxation Group/Graduation)   Attendance Attended   Attendance Duration (min) 31-45   Interactions Interacted appropriately   Affect/Mood Appropriate   Goals Achieved Displayed empathy; Increased hopefulness; Able to listen to others; Able to engage in interactions; Able to give feedback to another

## 2020-02-06 NOTE — PLAN OF CARE
Problem: Alteration in Thoughts and Perception  Goal: Agree to be compliant with medication regime, as prescribed and report medication side effects  Description  Interventions:  - Offer appropriate PRN medication and supervise ingestion; conduct aims, as needed   Outcome: Progressing  Goal: Attend and participate in unit activities, including therapeutic, recreational, and educational groups  Description  Interventions:  - Provide therapeutic and educational activities daily, encourage attendance and participation, and document same in the medical record     CERTIFIED PEER SPECIALIST INTERVENTIONS:    Complete peer assessment with patient to assess their needs and identify their goals to complete while in the recovery program as well as once discharged into the community  Patient will complete WRAP Plan, Crisis Plan and 5 Life Domains  Patient will attend 50% of groups offered on the unit  Patient will complete a goal card weekly  Outcome: Progressing  Goal: Complete daily ADLs, including personal hygiene independently, as able  Description  Interventions:  - Observe, teach, and assist patient with ADLS  - Monitor and promote a balance of rest/activity, with adequate nutrition and elimination   Outcome: Progressing     Problem: Depression  Goal: Refrain from isolation  Description  Interventions:  - Develop a trusting relationship   - Encourage socialization   Outcome: Not Progressing     2015 Ilda White was visible early in shift seated in phone chair in [place] when phone not in use  She can be a bit intrusive in her personal inquiries of staff (I e  Nurse's current state of health), but, pleasant  Will interact w/some peers @ the supper table  Did ask to shower, groom & was assisted w/setup as well as hair care  Ate 75% of meal (egg salad) w/an Ensure  Came up on own for supper medicine  Otherwise isolates in room in free time   Was visited by sister w/warm interactions between them & ate a piece strawberry pie sister brought  Taking part now in PM Group

## 2020-02-06 NOTE — PROGRESS NOTES
02/06/20 0900   Team Meeting   Meeting Type Daily Rounds   Team Members Present   Team Members Present Physician;Nurse;; Other (Discipline and Name)   Physician Team Member Dr Tyrese Garza, RN   Care Management Team Member Brenda Hemphill   Other (Discipline and Name) Anthony Colindres LCSW     Patient last showered 2/1  No groups  7-3 shift but attended 3-11 shift groups  Slept

## 2020-02-06 NOTE — PLAN OF CARE
Problem: PAIN - ADULT  Goal: Verbalizes/displays adequate comfort level or baseline comfort level  Description  Interventions:  - Encourage patient to monitor pain and request assistance  - Assess pain using appropriate pain scale  - Administer analgesics based on type and severity of pain and evaluate response  - Implement non-pharmacological measures as appropriate and evaluate response  - Consider cultural and social influences on pain and pain management  - Notify physician/advanced practitioner if interventions unsuccessful or patient reports new pain  Outcome: Progressing     Problem: SAFETY ADULT  Goal: Patient will remain free of falls  Description  INTERVENTIONS:  - Assess patient frequently for physical needs  -  Identify cognitive and physical deficits and behaviors that affect risk of falls    -  [place] fall precautions as indicated by assessment   - Educate patient/family on patient safety including physical limitations  - Instruct patient to call for assistance with activity based on assessment  - Modify environment to reduce risk of injury  - Consider OT/PT consult to assist with strengthening/mobility  Outcome: Progressing     Problem: RESPIRATORY - ADULT  Goal: Achieves optimal ventilation and oxygenation  Description  INTERVENTIONS:  - Assess for changes in respiratory status  - Assess for changes in mentation and behavior  - Position to facilitate oxygenation and minimize respiratory effort  - Oxygen administration by appropriate delivery method based on oxygen saturation (per order) or ABGs  - Initiate smoking cessation education as indicated  - Encourage broncho-pulmonary hygiene including cough, deep breathe, Incentive Spirometry  - Assess the need for suctioning and aspirate as needed  - Assess and instruct to report SOB or any respiratory difficulty  - Respiratory Therapy support as indicated  Outcome: Progressing     Problem: SLEEP DISTURBANCE  Goal: Will exhibit normal sleeping pattern  Description  Interventions:  -  Assess the patients sleep pattern, noting recent changes  - Administer medication as ordered  - Decrease environmental stimuli, including noise, as appropriate during the night  - Encourage the patient to actively participate in unit groups and or exercise during the day to enhance ability to achieve adequate sleep at night  - Assess the patient, in the morning, encouraging a description of sleep experience  Outcome: Progressing    Maggie maintained on ongoing fall and SAFE precaution   Laying in bed with eyes closed, breath even and unlabored   On O2 with humidifier @1L/m via nasal cannula   Q 15 minutes rounding   No somatic complaint overnight  No PRN needed for sleep aid   No indication of pain or discomfort   No respiratory distress   Will continue to monitor

## 2020-02-07 NOTE — PROGRESS NOTES
02/07/20 0800   Team Meeting   Meeting Type Daily Rounds   Team Members Present   Team Members Present Physician;Nurse;   Physician Team Member Dr Birgit Castro Team Member Hollie Yee, RN   Care Management Team Member Brenda Green     Paranoid that someone is tampering with her humidifier on her O2  No somatic complaints but very needy and entitled  Slept

## 2020-02-07 NOTE — PLAN OF CARE
Problem: Alteration in Thoughts and Perception  Goal: Verbalize thoughts and feelings  Description  Interventions:  - Promote a nonjudgmental and trusting relationship with the patient through active listening and therapeutic communication  - Assess patient's level of functioning, behavior and potential for risk  - Engage patient in 1 on 1 interactions for a minimum of 15 minutes each session  - Encourage patient to express fears, feelings, frustrations, and discuss symptoms    - [place] patient to reality, help patient recognize reality-based thinking   - Administer medications as ordered and assess for potential side effects  - Provide the patient education related to the signs and symptoms of the illness and desired effects of prescribed medications  Outcome: Progressing  Goal: Agree to be compliant with medication regime, as prescribed and report medication side effects  Description  Interventions:  - Offer appropriate PRN medication and supervise ingestion; conduct aims, as needed   Outcome: Progressing     Problem: Depression  Goal: Refrain from isolation  Description  Interventions:  - Develop a trusting relationship   - Encourage socialization   Outcome: Not Progressing     1930 Alva Jay expressed paranoia that someone is lacing the bottle of humidification water attached to her compressor w/"something"  Bases this on feeling "more depressed" the past couple days  Presented to her it is more likely her response to the some tumultuous past couple days related to her aborted outing, issues w/staff, confrontation by therapist on her behaviors  Willing to consider  Is isolative to room & bed, but, came out for supper medicine, ate 100% of meal w/Ensure

## 2020-02-07 NOTE — PROGRESS NOTES
2145 Agustin Edgar did take part in PM Group, came up for HS medicine (except the Singulair), had an HS snack before retiring to bed wearing the QHS humidified nasal O2 @ 1L

## 2020-02-07 NOTE — PLAN OF CARE
Problem: PAIN - ADULT  Goal: Verbalizes/displays adequate comfort level or baseline comfort level  Description  Interventions:  - Encourage patient to monitor pain and request assistance  - Assess pain using appropriate pain scale  - Administer analgesics based on type and severity of pain and evaluate response  - Implement non-pharmacological measures as appropriate and evaluate response  - Consider cultural and social influences on pain and pain management  - Notify physician/advanced practitioner if interventions unsuccessful or patient reports new pain  Outcome: Progressing     Problem: SAFETY ADULT  Goal: Patient will remain free of falls  Description  INTERVENTIONS:  - Assess patient frequently for physical needs  -  Identify cognitive and physical deficits and behaviors that affect risk of falls    -  [place] fall precautions as indicated by assessment   - Educate patient/family on patient safety including physical limitations  - Instruct patient to call for assistance with activity based on assessment  - Modify environment to reduce risk of injury  - Consider OT/PT consult to assist with strengthening/mobility  Outcome: Progressing     Problem: SLEEP DISTURBANCE  Goal: Will exhibit normal sleeping pattern  Description  Interventions:  -  Assess the patients sleep pattern, noting recent changes  - Administer medication as ordered  - Decrease environmental stimuli, including noise, as appropriate during the night  - Encourage the patient to actively participate in unit groups and or exercise during the day to enhance ability to achieve adequate sleep at night  - Assess the patient, in the morning, encouraging a description of sleep experience  Outcome: Progressing    Maggie maintained on ongoing fall and SAFE precaution  [place] in bed with eyes closed, breath even and unlabored   On O2 with humidifier @1L/m via nasal cannula   Q 15 minutes rounding   No somatic complaint overnight  No PRN needed for sleep aid   No indication of pain or discomfort   No respiratory distress    Humidification bottle change and dated    Will continue to monitor

## 2020-02-07 NOTE — PROGRESS NOTES
Progress Note - Efraín Sizer 1955, 60 y o  female MRN: [id number]    [facility] Encounter: [id number]    Primary Care Provider: Peewee Palafox PA-C   Date and time admitted to hospital: 7/23/2019  5:30 PM        * Schizoaffective disorder, bipolar type [facility])  Assessment & Plan  Patient was again accusatory to staff this morning as she thought there were tampering with the oxygen concentrator and the humidifier  She has not taken a shower and the last few days again and states she will try to take one  tomorrow today and then again on Monday morning before she goes out for a pass  She has been attending groups and knows the importance of attending IMR groups to be able to continue to keep her privileges  She is again looking forward to staff escorted activity to go to [employer] next Wednesday and she knows that she has to be up and ready and take a shower the day before  She is now hoping to go on another day pass with her sister once she completes that past successful to the community with staff and follows the directions of the staff  She has been compliant with the medications somewhat suspicious paranoid  She continues to talk in a stilted manner and has a tendency to over here conversations by staff the nurse's desk despite reminders not to do so  She has been somewhat suspicious as usual and hygiene is not that great  Is still psychosomatic at times but redirectable  She was upset about being there when another patient choked on her food yesterday  No longer voicing any delusional believes systems at this time  Compliant with medications with no side effects or problems     Current medications:    Current Facility-Administered Medications:     acetaminophen (TYLENOL) tablet 325 mg, 325 mg, Oral, Q6H PRN, Jeet Levine MD    acetaminophen (TYLENOL) tablet 650 mg, 650 mg, Oral, Q6H PRN, Jeet Levine MD    acetaminophen (TYLENOL) tablet 650 mg, 650 mg, Oral, Q8H PRN, Dawson Murguia Ayaan Suh MD    albuterol (PROVENTIL HFA,VENTOLIN HFA) inhaler 2 puff, 2 puff, Inhalation, Q4H PRN, Jaz Beasley MD, 2 puff at 10/11/19 0424    aluminum-magnesium hydroxide-simethicone (MYLANTA) 200-200-20 mg/5 mL oral suspension 15 mL, 15 mL, Oral, Q4H PRN, Jaz Beasley MD, 15 mL at 01/26/20 1021    ammonium lactate (LAC-HYDRIN) 12 % lotion 1 application, 1 application, Topical, BID PRN, Jaz Beasley MD    benzonatate (TESSALON PERLES) capsule 100 mg, 100 mg, Oral, TID PRN, Jaz Beasley MD    benztropine (COGENTIN) injection 1 mg, 1 mg, Intramuscular, Q8H PRN, Jaz Beasley MD    carbamide peroxide (DEBROX) 6 5 % otic solution 5 drop, 5 drop, Left Ear, BID PRN, Vlad Ballard MD    cloZAPine (CLOZARIL) tablet 25 mg, 25 mg, Oral, BID, Jaz Beasley MD, 25 mg at 02/06/20 2127    cloZAPine (CLOZARIL) tablet 50 mg, 50 mg, Oral, BID, Jaz Beasley MD, 50 mg at 02/06/20 2128    EPINEPHrine PF (ADRENALIN) 1 mg/mL injection 0 15 mg, 0 15 mg, Intramuscular, Once PRN, Jaz Beasley MD    fluticasone-vilanterol (BREO ELLIPTA) 200-25 MCG/INH inhaler 1 puff, 1 puff, Inhalation, Daily, Jaz Beasley MD, 1 puff at 02/07/20 0822    ketotifen (ZADITOR) 0 025 % ophthalmic solution 1 drop, 1 drop, Right Eye, BID PRN, Jaz Beasley MD    levothyroxine tablet 125 mcg, 125 mcg, Oral, Early Morning, Jaz Beasley MD, 125 mcg at 02/07/20 0542    magnesium hydroxide (MILK OF MAGNESIA) 400 mg/5 mL oral suspension 30 mL, 30 mL, Oral, Daily PRN, Jaz Beasley MD    montelukast (SINGULAIR) tablet 10 mg, 10 mg, Oral, HS, Jaz Beasley MD, 10 mg at 01/16/20 2106    OLANZapine (ZyPREXA) IM injection 5 mg, 5 mg, Intramuscular, Q8H PRN, Jaz Beasley MD    OLANZapine (ZyPREXA) tablet 5 mg, 5 mg, Oral, Q8H PRN, Jaz Beasley MD    ondansetron (ZOFRAN-ODT) dispersible tablet 4 mg, 4 mg, Oral, Q6H PRN, Jaz Beasley MD, 4 mg at 12/09/19 1757    pantoprazole (PROTONIX) EC tablet 40 mg, 40 mg, Oral, Early Morning, Jaz Beasley MD, 40 mg at 02/07/20 0542    polyethylene glycol (MIRALAX) packet 17 g, 17 g, Oral, Daily PRN, Manuel Copeland MD    polyvinyl alcohol (LIQUIFILM TEARS) 1 4 % ophthalmic solution 1 drop, 1 drop, Both Eyes, Q3H PRN, Manuel Copeland MD    sertraline (ZOLOFT) tablet 150 mg, 150 mg, Oral, Daily, Manuel Copeland MD, 150 mg at 02/07/20 4374    sucralfate (CARAFATE) oral suspension 1,000 mg, 1,000 mg, Oral, BID, Ruperto Robles MD, 1,000 mg at 02/07/20 0621    theophylline (JEF-24) 24 hr capsule 200 mg, 200 mg, Oral, Daily, Manuel Copeland MD, 200 mg at 02/07/20 1948    tiotropium [facility]) capsule for inhaler 18 mcg, 18 mcg, Inhalation, Daily, Manuel Copeland MD, 18 mcg at 02/07/20 7711    traZODone (DESYREL) tablet 25 mg, 25 mg, Oral, HS PRN, Manuel Copeland MD  Justification if on more than two antipsychotics: not applicable  Side effects if any: none    Risks , benefits, side effects and precautions of medications discussed with patient and reminded patient to let us know any problems with medications     Objective:     Vital Signs:  Vitals:    02/06/20 0705 02/06/20 1532 02/06/20 1949 02/07/20 0700   BP: (!) 76/51 94/57 90/62 100/65   BP Location: Left arm Left arm Left arm Right arm   Pulse: 58 88 96 75   Resp: 18 14 14 17   Temp:  97 9 °F (36 6 °C) 98 °F (36 7 °C) 97 6 °F (36 4 °C)   TempSrc:  Temporal Temporal    SpO2:  96% 93%    Weight:       Height:         Appearance:  age appropriate, casually dressed and older than stated age not very well groomed but good eye contact and hair uncombed   Behavior:  deviant, evasive and guarded suspicious at times   Speech:  delayed, increased latency of response and tangential    Mood:  anxious, euphoric and irritable    Affect:  increased in intensity, increased in range, mood-congruent and redirectable   Thought Process:  circumstantial, concrete, disorganized, goal directed, illogical and perserverative with tendency for stilted speech as if talking in a court of law   Thought Content:  delusions grandiose and persecutory  continues to be suspicious and act is a tree to certain staff claiming that that tampering with her oxygen concentrator and inhalants off and on  Still with psychosomatic symptoms  No preoccupation with violence  No current suicidal homicidal thoughts intent or plans  No side of   Perceptual Disturbances: None not visibly responding to internal stimuli at this time   Risk Potential: Inability to care for herself and refusal to take showers regularly   Sensorium:  person, place, time/date, situation, day of week, month of year, year and time   Cognition:  grossly intact with no problems with recent or remote memory l with no language deficits and aware of current events   Consciousness:  alert and awake    Attention: Fair   Intellect: At least average   Insight:  Fair   Judgment: limited due to refusal to take consistent showers      Motor Activity: no abnormal movements         Recent Labs:  Results Reviewed     None          I/O Past 24 hours:  No intake/output data recorded  No intake/output data recorded  Assessment / Plan:     Schizoaffective disorder, bipolar type ([facility] )      Reason for continued inpatient care: to assess ability to maintain improvement by attending to ADLs and taking showers regular and see how she does on outing with family after another outing with staff escort next week  Acceptance by patient: accepting now  Hopefulness in recovery: living at the [facility] soon  Understanding of medications :  yes  Involved in reintegration process: attending groups and has off grounds and off unit privileges  Trusting in relatoinship with psychiatrist:  Reva Altamirano now    Recommended Treatment:    Medication changes:  1) none today    Non-pharmacological treatments  1) Continue with individual therapy, group therapy, milieu therapy and occupational therapy using recovery principles and psycho-education about accepting illness and need for treatment     2) encourage to take showers twice a week and cooperate with treatment and adl skills as per her behav  plan  3) reschedule another pass with staff to community next week on Wednesday before pass with sister to see if she would fully comply   Safety  1) Safety/communication plan established targeting dynamic risk factors above  Discharge Plan to [facility] at [address] / Coordination of Care    Total floor / unit time spent today 15 minutes  Greater than 50% of total time was spent with the patient and / or family counseling and / or coordination of care  Receptive to listening and learning coping skills for management of symptoms and attending to ADLs  Patient's Rights, confidentiality and exceptions to confidentiality, use of automated medical record, [facility] staff access to medical record, and consent to treatment reviewed      Felisa Florence MD

## 2020-02-07 NOTE — ASSESSMENT & PLAN NOTE
Patient was again accusatory to staff this morning as she thought there were tampering with the oxygen concentrator and the humidifier  She has not taken a shower and the last few days again and states she will try to take one  tomorrow today and then again on Monday morning before she goes out for a pass  She has been attending groups and knows the importance of attending IMR groups to be able to continue to keep her privileges  She is again looking forward to staff escorted activity to go to [place] next Wednesday and she knows that she has to be up and ready and take a shower the day before  She is now hoping to go on another day pass with her sister once she completes that past successful to the community with staff and follows the directions of the staff  She has been compliant with the medications somewhat suspicious paranoid  She continues to talk in a stilted manner and has a tendency to over here conversations by staff the nurse's desk despite reminders not to do so  She has been somewhat suspicious as usual and hygiene is not that great  Is still psychosomatic at times but redirectable  She was upset about being there when another patient choked on her food yesterday  No longer voicing any delusional believes systems at this time  Compliant with medications with no side effects or problems     Current medications:    Current Facility-Administered Medications:     acetaminophen (TYLENOL) tablet 325 mg, 325 mg, Oral, Q6H PRN, Tree Madden MD    acetaminophen (TYLENOL) tablet 650 mg, 650 mg, Oral, Q6H PRN, Tree Madden MD    acetaminophen (TYLENOL) tablet 650 mg, 650 mg, Oral, Q8H PRN, Tree Madden MD    albuterol (PROVENTIL HFA,VENTOLIN HFA) inhaler 2 puff, 2 puff, Inhalation, Q4H PRN, Tree Madden MD, 2 puff at 10/11/19 0424    aluminum-magnesium hydroxide-simethicone (MYLANTA) 200-200-20 mg/5 mL oral suspension 15 mL, 15 mL, Oral, Q4H PRN, Tree Madden MD, 15 mL at 01/26/20 1021   ammonium lactate (LAC-HYDRIN) 12 % lotion 1 application, 1 application, Topical, BID PRN, Deep Durand MD    benzonatate (TESSALON PERLES) capsule 100 mg, 100 mg, Oral, TID PRN, Deep Durand MD    benztropine (COGENTIN) injection 1 mg, 1 mg, Intramuscular, Q8H PRN, Deep Durand MD    carbamide peroxide (DEBROX) 6 5 % otic solution 5 drop, 5 drop, Left Ear, BID PRN, Ale Henderson MD    cloZAPine (CLOZARIL) tablet 25 mg, 25 mg, Oral, BID, Deep Durand MD, 25 mg at 02/06/20 2127    cloZAPine (CLOZARIL) tablet 50 mg, 50 mg, Oral, BID, Deep Durand MD, 50 mg at 02/06/20 2128    EPINEPHrine PF (ADRENALIN) 1 mg/mL injection 0 15 mg, 0 15 mg, Intramuscular, Once PRN, Deep Durand MD    fluticasone-vilanterol (BREO ELLIPTA) 200-25 MCG/INH inhaler 1 puff, 1 puff, Inhalation, Daily, Deep Durand MD, 1 puff at 02/07/20 0822    ketotifen (ZADITOR) 0 025 % ophthalmic solution 1 drop, 1 drop, Right Eye, BID PRN, Deep Durand MD    levothyroxine tablet 125 mcg, 125 mcg, Oral, Early Morning, Deep Durand MD, 125 mcg at 02/07/20 0542    magnesium hydroxide (MILK OF MAGNESIA) 400 mg/5 mL oral suspension 30 mL, 30 mL, Oral, Daily PRN, Deep Durand MD    montelukast (SINGULAIR) tablet 10 mg, 10 mg, Oral, HS, Deep Durand MD, 10 mg at 01/16/20 2106    OLANZapine (ZyPREXA) IM injection 5 mg, 5 mg, Intramuscular, Q8H PRN, Deep Durand MD    OLANZapine (ZyPREXA) tablet 5 mg, 5 mg, Oral, Q8H PRN, Deep Durand MD    ondansetron (ZOFRAN-ODT) dispersible tablet 4 mg, 4 mg, Oral, Q6H PRN, Deep Durand MD, 4 mg at 12/09/19 1757    pantoprazole (PROTONIX) EC tablet 40 mg, 40 mg, Oral, Early Morning, Deep Durand MD, 40 mg at 02/07/20 0542    polyethylene glycol (MIRALAX) packet 17 g, 17 g, Oral, Daily PRN, Deep Durand MD    polyvinyl alcohol (LIQUIFILM TEARS) 1 4 % ophthalmic solution 1 drop, 1 drop, Both Eyes, Q3H PRN, Deep Durand MD    sertraline (ZOLOFT) tablet 150 mg, 150 mg, Oral, Daily, Deep Durand MD, 150 mg at 02/07/20 8970    sucralfate (CARAFATE) oral suspension 1,000 mg, 1,000 mg, Oral, BID, Cat Torres MD, 1,000 mg at 02/07/20 0621    theophylline (JEF-24) 24 hr capsule 200 mg, 200 mg, Oral, Daily, Jill Gayle MD, 200 mg at 02/07/20 9481    tiotropium [facility]) capsule for inhaler 18 mcg, 18 mcg, Inhalation, Daily, Jill Gayle MD, 18 mcg at 02/07/20 4458    traZODone (DESYREL) tablet 25 mg, 25 mg, Oral, HS PRN, Jill Gayle MD  Justification if on more than two antipsychotics: not applicable  Side effects if any: none    Risks , benefits, side effects and precautions of medications discussed with patient and reminded patient to let us know any problems with medications     Objective:     Vital Signs:  Vitals:    02/06/20 0705 02/06/20 1532 02/06/20 1949 02/07/20 0700   BP: (!) 76/51 94/57 90/62 100/65   BP Location: Left arm Left arm Left arm Right arm   Pulse: 58 88 96 75   Resp: 18 14 14 17   Temp:  97 9 °F (36 6 °C) 98 °F (36 7 °C) 97 6 °F (36 4 °C)   TempSrc:  Temporal Temporal    SpO2:  96% 93%    Weight:       Height:         Appearance:  age appropriate, casually dressed and older than stated age not very well groomed but good eye contact and hair uncombed   Behavior:  deviant, evasive and guarded suspicious at times   Speech:  delayed, increased latency of response and tangential    Mood:  anxious, euphoric and irritable    Affect:  increased in intensity, increased in range, mood-congruent and redirectable   Thought Process:  circumstantial, concrete, disorganized, goal directed, illogical and perserverative with tendency for stilted speech as if talking in a court of law   Thought Content:  delusions  grandiose and persecutory  continues to be suspicious and act is a tree to certain staff claiming that that tampering with her oxygen concentrator and inhalants off and on  Still with psychosomatic symptoms  No preoccupation with violence  No current suicidal homicidal thoughts intent or plans    No side of   Perceptual Disturbances: None not visibly responding to internal stimuli at this time   Risk Potential: Inability to care for herself and refusal to take showers regularly   Sensorium:  person, place, time/date, situation, day of week, month of year, year and time   Cognition:  grossly intact with no problems with recent or remote memory l with no language deficits and aware of current events   Consciousness:  alert and awake    Attention: Fair   Intellect: At least average   Insight:  Fair   Judgment: limited due to refusal to take consistent showers      Motor Activity: no abnormal movements         Recent Labs:  Results Reviewed     None          I/O Past 24 hours:  No intake/output data recorded  No intake/output data recorded  Assessment / Plan:     [facility]ffect[facility]      Reason for continued inpatient care: to assess ability to maintain improvement by attending to ADLs and taking showers regular and see how she does on outing with family after another outing with staff escort next week  Acceptance by patient: accepting now  Hopefulness in recovery: living at the [facility]  Understanding of medications :  yes  Involved in reintegration process: attending groups and has off grounds and off unit privileges  Trusting in relatoinship with psychiatrist:  Jud Cain now    Recommended Treatment:    Medication changes:  1) none today    Non-pharmacological treatments  1) Continue with individual therapy, group therapy, milieu therapy and occupational therapy using recovery principles and psycho-education about accepting illness and need for treatment     2) encourage to take showers twice a week and cooperate with treatment and adl skills as per her behav  plan  3) reschedule another pass with staff to community next week on Wednesday before pass with sister to see if she would fully comply   Safety  1) Safety/communication plan established targeting dynamic risk factors above   Discharge Plan to a [facility] at [address] / Coordination of Care    Total floor / unit time spent today 15 minutes  Greater than 50% of total time was spent with the patient and / or family counseling and / or coordination of care  Receptive to listening and learning coping skills for management of symptoms and attending to ADLs  Patient's Rights, confidentiality and exceptions to confidentiality, use of automated medical record, [address] [place] [address] staff access to medical record, and consent to treatment reviewed      Alirio Frazier MD

## 2020-02-07 NOTE — PLAN OF CARE
Problem: Alteration in Thoughts and Perception  Goal: Treatment Goal: Gain control of psychotic behaviors/thinking, reduce/eliminate presenting symptoms and demonstrate improved reality functioning upon discharge  Outcome: Progressing  Goal: Verbalize thoughts and feelings  Description  Interventions:  - Promote a nonjudgmental and trusting relationship with the patient through active listening and therapeutic communication  - Assess patient's level of functioning, behavior and potential for risk  - Engage patient in 1 on 1 interactions for a minimum of 15 minutes each session  - Encourage patient to express fears, feelings, frustrations, and discuss symptoms    - [place] patient to reality, help patient recognize reality-based thinking   - Administer medications as ordered and assess for potential side effects  - Provide the patient education related to the signs and symptoms of the illness and desired effects of prescribed medications  Outcome: Progressing  Goal: Agree to be compliant with medication regime, as prescribed and report medication side effects  Description  Interventions:  - Offer appropriate PRN medication and supervise ingestion; conduct aims, as needed   Outcome: Progressing  Goal: Attend and participate in unit activities, including therapeutic, recreational, and educational groups  Description  Interventions:  - Provide therapeutic and educational activities daily, encourage attendance and participation, and document same in the medical record     CERTIFIED PEER SPECIALIST INTERVENTIONS:    Complete peer assessment with patient to assess their needs and identify their goals to complete while in the recovery program as well as once discharged into the community  Patient will complete WRAP Plan, Crisis Plan and 5 Life Domains  Patient will attend 50% of groups offered on the unit  Patient will complete a goal card weekly      Outcome: Progressing  Goal: Recognize dysfunctional thoughts, communicate reality-based thoughts at the time of discharge  Description  Interventions:  - Provide medication and psycho-education to assist patient in compliance and developing insight into his/her illness   Outcome: Progressing  Goal: Complete daily ADLs, including personal hygiene independently, as able  Description  Interventions:  - Observe, teach, and assist patient with ADLS  - Monitor and promote a balance of rest/activity, with adequate nutrition and elimination   Outcome: Progressing     Problem: Ineffective Coping  Goal: Identifies ineffective coping skills  Outcome: Progressing  Goal: Identifies healthy coping skills  Outcome: Progressing  Goal: Demonstrates healthy coping skills  Outcome: Progressing  Goal: Participates in unit activities  Description  Interventions:  - Provide therapeutic environment   - Provide required programming   - Redirect inappropriate behaviors   Outcome: Progressing  Goal: Patient/Family participate in treatment and DC plans  Description  Interventions:  - Provide therapeutic environment  Outcome: Progressing  Goal: Patient/Family verbalizes awareness of resources  Outcome: Progressing  Goal: Understands least restrictive measures  Description  Interventions:  - Utilize least restrictive behavior  Outcome: Progressing     Problem: Risk for Self Injury/Neglect  Goal: Treatment Goal: Remain safe during length of stay, learn and adopt new coping skills, and be free of self-injurious ideation, impulses and acts at the time of discharge  Outcome: Progressing  Goal: Verbalize thoughts and feelings  Description  Interventions:  - Assess and re-assess patient's lethality and potential for self-injury  - Engage patient in 1:1 interactions, daily, for a minimum of 15 minutes  - Encourage patient to express feelings, fears, frustrations, hopes  - Establish rapport/trust with patient   Outcome: Progressing  Goal: Refrain from harming self  Description  Interventions:  - Monitor patient closely, per order  - Develop a trusting relationship  - Supervise medication ingestion, monitor effects and side effects   Outcome: Progressing  Goal: Attend and participate in unit activities, including therapeutic, recreational, and educational groups  Description  Interventions:  - Provide therapeutic and educational activities daily, encourage attendance and participation, and document same in the medical record  - Obtain collateral information, encourage visitation and family involvement in care   Outcome: Progressing  Goal: Recognize maladaptive responses and adopt new coping mechanisms  Outcome: Progressing  Goal: Complete daily ADLs, including personal hygiene independently, as able  Description  Interventions:  - Observe, teach, and assist patient with ADLS  - Monitor and promote a balance of rest/activity, with adequate nutrition and elimination  Outcome: Progressing     Problem: Depression  Goal: Treatment Goal: Demonstrate behavioral control of depressive symptoms, verbalize feelings of improved mood/affect, and adopt new coping skills prior to discharge  Outcome: Progressing  Goal: Verbalize thoughts and feelings  Description  Interventions:  - Assess and re-assess patient's level of risk   - Engage patient in 1:1 interactions, daily, for a minimum of 15 minutes   - Encourage patient to express feelings, fears, frustrations, hopes   Outcome: Progressing  Goal: Refrain from isolation  Description  Interventions:  - Develop a trusting relationship   - Encourage socialization   Outcome: Progressing  Goal: Refrain from self-neglect  Outcome: Progressing     Problem: Anxiety  Goal: Anxiety is at manageable level  Description  Interventions:  - Assess and monitor patient's anxiety level  - Monitor for signs and symptoms of anxiety both physical and emotional (heart palpitations, chest pain, shortness of breath, headaches, nausea, feeling jumpy, restlessness, irritable, apprehensive)     - Collaborate with interdisciplinary team and initiate plan and interventions as ordered  - [place] patient to unit/surroundings  - Explain treatment plan  - Encourage participation in care  - Encourage verbalization of concerns/fears  - Identify coping mechanisms  - Assist in developing anxiety-reducing skills  - Administer/offer alternative therapies  - Limit or eliminate stimulants  Outcome: Progressing     Problem: Alteration in Orientation  Goal: Interact with staff daily  Description  Interventions:  - Assess and re-assess patient's level of orientation  - Engage patient in 1 on 1 interactions, daily, for a minimum of 15 minutes   - Establish rapport/trust with patient   Outcome: Progressing  Goal: Cooperate with recommended testing/procedures  Description  Interventions:  - Determine need for ancillary testing  - Observe for mental status changes  - Implement falls/precaution protocol   Outcome: Progressing     Problem: PAIN - ADULT  Goal: Verbalizes/displays adequate comfort level or baseline comfort level  Description  Interventions:  - Encourage patient to monitor pain and request assistance  - Assess pain using appropriate pain scale  - Administer analgesics based on type and severity of pain and evaluate response  - Implement non-pharmacological measures as appropriate and evaluate response  - Consider cultural and social influences on pain and pain management  - Notify physician/advanced practitioner if interventions unsuccessful or patient reports new pain  Outcome: Progressing     Problem: SAFETY ADULT  Goal: Patient will remain free of falls  Description  INTERVENTIONS:  - Assess patient frequently for physical needs  -  Identify cognitive and physical deficits and behaviors that affect risk of falls    -  [place] fall precautions as indicated by assessment   - Educate patient/family on patient safety including physical limitations  - Instruct patient to call for assistance with activity based on assessment  - Modify environment to reduce risk of injury  - Consider OT/PT consult to assist with strengthening/mobility  Outcome: Progressing     Problem: RESPIRATORY - ADULT  Goal: Achieves optimal ventilation and oxygenation  Description  INTERVENTIONS:  - Assess for changes in respiratory status  - Assess for changes in mentation and behavior  - Position to facilitate oxygenation and minimize respiratory effort  - Oxygen administration by appropriate delivery method based on oxygen saturation (per order) or ABGs  - Initiate smoking cessation education as indicated  - Encourage broncho-pulmonary hygiene including cough, deep breathe, Incentive Spirometry  - Assess the need for suctioning and aspirate as needed  - Assess and instruct to report SOB or any respiratory difficulty  - Respiratory Therapy support as indicated  Outcome: Progressing     Problem: DISCHARGE PLANNING  Goal: Discharge to home or other facility with appropriate resources  Description  INTERVENTIONS:  - Conduct assessment to determine patient/family and health care team treatment goals, and need for post-acute services based on payer coverage, community resources, and patient preferences, and barriers to discharge  - Address psychosocial, clinical, and financial barriers to discharge as identified in assessment in conjunction with the patient/family and health care team  - Assist the patient in reintegration back into the community by removing barriers which may hinder a successful discharge once deemed stable  - Arrange appropriate level of post-acute services according to patient's needs and preference and payer coverage in collaboration with the physician and health care team  - Communicate with and update the patient/family, physician, and health care team regarding progress on the discharge plan  - Arrange appropriate transportation to post-acute venues    Outcome: Progressing     Problem: SLEEP DISTURBANCE  Goal: Will exhibit normal sleeping pattern  Description  Interventions:  -  Assess the patients sleep pattern, noting recent changes  - Administer medication as ordered  - Decrease environmental stimuli, including noise, as appropriate during the night  - Encourage the patient to actively participate in unit groups and or exercise during the day to enhance ability to achieve adequate sleep at night  - Assess the patient, in the morning, encouraging a description of sleep experience  Outcome: Progressing     Problem: Nutrition/Hydration-ADULT  Goal: Nutrient/Hydration intake appropriate for improving, restoring or maintaining nutritional needs  Description  Monitor and assess patient's nutrition/hydration status for malnutrition  Collaborate with interdisciplinary team and initiate plan and interventions as ordered  Monitor patient's weight and dietary intake as ordered or per policy  Utilize nutrition screening tool and intervene as necessary  Determine patient's food preferences and provide high-protein, high-caloric foods as appropriate  INTERVENTIONS:  - Monitor oral intake, urinary output, labs, and treatment plans  - Assess nutrition and hydration status and recommend course of action  - Evaluate amount of meals eaten  - Assist patient with eating if necessary   - Allow adequate time for meals  - Recommend/ encourage appropriate diets, oral nutritional supplements, and vitamin/mineral supplements  - Order, calculate, and assess calorie counts as needed  - Recommend, monitor, and adjust tube feedings and TPN/PPN based on assessed needs  - Assess need for intravenous fluids  - Provide specific nutrition/hydration education as appropriate  - Include patient/family/caregiver in decisions related to nutrition  Outcome: Progressing     Patient is cooperative and isolative to her room and bed  Out for meds and meals  Ate 50% breakfast and 10% lunch  Denies depression, anxiety, SI and HI  No c/o s/s pain  No somatic complaint    Patient attended IMR    Will continue to monitor progress in recovery program

## 2020-02-08 NOTE — PROGRESS NOTES
Sleeping at this time, no distress noted  O2 on at 1L via NC, all precautions in place and maintained   Monitoring continues

## 2020-02-08 NOTE — PLAN OF CARE
Problem: Alteration in Thoughts and Perception  Goal: Verbalize thoughts and feelings  Description  Interventions:  - Promote a nonjudgmental and trusting relationship with the patient through active listening and therapeutic communication  - Assess patient's level of functioning, behavior and potential for risk  - Engage patient in 1 on 1 interactions for a minimum of 15 minutes each session  - Encourage patient to express fears, feelings, frustrations, and discuss symptoms    - [place] patient to reality, help patient recognize reality-based thinking   - Administer medications as ordered and assess for potential side effects  - Provide the patient education related to the signs and symptoms of the illness and desired effects of prescribed medications  Outcome: Progressing  Goal: Agree to be compliant with medication regime, as prescribed and report medication side effects  Description  Interventions:  - Offer appropriate PRN medication and supervise ingestion; conduct aims, as needed   Outcome: Progressing  Goal: Attend and participate in unit activities, including therapeutic, recreational, and educational groups  Description  Interventions:  - Provide therapeutic and educational activities daily, encourage attendance and participation, and document same in the medical record     CERTIFIED PEER SPECIALIST INTERVENTIONS:    Complete peer assessment with patient to assess their needs and identify their goals to complete while in the recovery program as well as once discharged into the community  Patient will complete WRAP Plan, Crisis Plan and 5 Life Domains  Patient will attend 50% of groups offered on the unit  Patient will complete a goal card weekly      Outcome: Progressing     Problem: Alteration in Orientation  Goal: Interact with staff daily  Description  Interventions:  - Assess and re-assess patient's level of orientation  - Engage patient in 1 on 1 interactions, daily, for a minimum of 15 minutes   - Establish rapport/trust with patient   Outcome: Progressing     Problem: SAFETY ADULT  Goal: Patient will remain free of falls  Description  INTERVENTIONS:  - Assess patient frequently for physical needs  -  Identify cognitive and physical deficits and behaviors that affect risk of falls  -  [place] fall precautions as indicated by assessment   - Educate patient/family on patient safety including physical limitations  - Instruct patient to call for assistance with activity based on assessment  - Modify environment to reduce risk of injury  - Consider OT/PT consult to assist with strengthening/mobility  Outcome: Progressing     Problem: Alteration in Thoughts and Perception  Goal: Complete daily ADLs, including personal hygiene independently, as able  Description  Interventions:  - Observe, teach, and assist patient with ADLS  - Monitor and promote a balance of rest/activity, with adequate nutrition and elimination   Outcome: Not Progressing     Priscilla Long was sitting on her bed at the beginning of the shift  She stated, "I thought maybe my sugar is low because I didn't eat lunch " When asked if she had a snack she stated that she did  She had "chips and a cheese stick " Then stated, "I did have 2 cookies for lunch " It was explained to her that her sugar would not be low with eating the things she had  She did not complain of dizziness or not feeling well  VSS  Took medication with prompting  Ate 100% of supper and drank Ensure  No BM or shower this shift  Appearance a bit disheveled  Did not attend evening group  Took HS medication with prompting  Ate snack  Oxygen saturation 91% on room air prior to oxygen 1 liter via nasal cannula applied  Continue to monitor  Precautions maintained

## 2020-02-08 NOTE — PROGRESS NOTES
Psychiatry Progress Note    Subjective: Interval History     Patient isolative to her room  Reports that she is just laying down getting some rest in between group programming  Patient friendly with Madison Mendoza and denying all psychiatric symptoms  Remains disheveled in her appearance and continues to need encouragement to adhere to ADLs  Patient however has been with paranoid delusions this week as she had been accusing staff of tampering with her oxygen equipment  Has been compliant with medications  Not eating all of her meals; has been consuming Ensure      Behavior over the last 24 hours:  unchanged  Sleep: normal  Appetite: normal  Medication side effects: No  ROS: no complaints    Current medications:    Current Facility-Administered Medications:     acetaminophen (TYLENOL) tablet 325 mg, 325 mg, Oral, Q6H PRN, Alirio Frazier MD    acetaminophen (TYLENOL) tablet 650 mg, 650 mg, Oral, Q6H PRN, Alirio Frazier MD    acetaminophen (TYLENOL) tablet 650 mg, 650 mg, Oral, Q8H PRN, Alirio Frazier MD    albuterol (PROVENTIL HFA,VENTOLIN HFA) inhaler 2 puff, 2 puff, Inhalation, Q4H PRN, Alirio Frazier MD, 2 puff at 10/11/19 0424    aluminum-magnesium hydroxide-simethicone (MYLANTA) 200-200-20 mg/5 mL oral suspension 15 mL, 15 mL, Oral, Q4H PRN, Alirio Frazier MD, 15 mL at 01/26/20 1021    ammonium lactate (LAC-HYDRIN) 12 % lotion 1 application, 1 application, Topical, BID PRN, Alirio Frazier MD    benzonatate (TESSALON PERLES) capsule 100 mg, 100 mg, Oral, TID PRN, Alirio Frazier MD    benztropine (COGENTIN) injection 1 mg, 1 mg, Intramuscular, Q8H PRN, Alirio Frazier MD    carbamide peroxide (DEBROX) 6 5 % otic solution 5 drop, 5 drop, Left Ear, BID PRN, Remo Nyhan, MD    cloZAPine (CLOZARIL) tablet 25 mg, 25 mg, Oral, BID, Alirio Frazier MD, 25 mg at 02/07/20 2128    cloZAPine (CLOZARIL) tablet 50 mg, 50 mg, Oral, BID, Alirio Frazier MD, 50 mg at 02/07/20 2128    EPINEPHrine PF (ADRENALIN) 1 mg/mL injection 0 15 mg, 0 15 mg, Intramuscular, Once PRN, Meldon Curling, MD    fluticasone-vilanterol (BREO ELLIPTA) 200-25 MCG/INH inhaler 1 puff, 1 puff, Inhalation, Daily, Meldon Curling, MD, 1 puff at 02/08/20 0911    ketotifen (ZADITOR) 0 025 % ophthalmic solution 1 drop, 1 drop, Right Eye, BID PRN, Meldon Curling, MD    levothyroxine tablet 125 mcg, 125 mcg, Oral, Early Morning, Meldon Curling, MD, 125 mcg at 02/08/20 0604    magnesium hydroxide (MILK OF MAGNESIA) 400 mg/5 mL oral suspension 30 mL, 30 mL, Oral, Daily PRN, Meldon Curling, MD    montelukast (SINGULAIR) tablet 10 mg, 10 mg, Oral, HS, Meldon Curling, MD, 10 mg at 01/16/20 2106    OLANZapine (ZyPREXA) IM injection 5 mg, 5 mg, Intramuscular, Q8H PRN, Meldon Curling, MD    OLANZapine (ZyPREXA) tablet 5 mg, 5 mg, Oral, Q8H PRN, Meldon Curling, MD    ondansetron (ZOFRAN-ODT) dispersible tablet 4 mg, 4 mg, Oral, Q6H PRN, Meldon Curling, MD, 4 mg at 12/09/19 1757    pantoprazole (PROTONIX) EC tablet 40 mg, 40 mg, Oral, Early Morning, Meldon Curling, MD, 40 mg at 02/08/20 0604    polyethylene glycol (MIRALAX) packet 17 g, 17 g, Oral, Daily PRN, Meldon Curling, MD    polyvinyl alcohol (LIQUIFILM TEARS) 1 4 % ophthalmic solution 1 drop, 1 drop, Both Eyes, Q3H PRN, Meldon Curling, MD    sertraline (ZOLOFT) tablet 150 mg, 150 mg, Oral, Daily, Meldon Curling, MD, 150 mg at 02/08/20 0912    sucralfate (CARAFATE) oral suspension 1,000 mg, 1,000 mg, Oral, BID, Cat Torres MD, 1,000 mg at 02/08/20 0604    theophylline (JEF-24) 24 hr capsule 200 mg, 200 mg, Oral, Daily, Meldon Curling, MD, 200 mg at 02/08/20 0911    tiotropium [facility]) capsule for inhaler 18 mcg, 18 mcg, Inhalation, Daily, Meldon Curling, MD, 18 mcg at 02/08/20 0911    traZODone (DESYREL) tablet 25 mg, 25 mg, Oral, HS PRN, Meldon Curling, MD    Current Problem List:    Patient Active Problem List   Diagnosis    Sepsis ([facility]    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[facility]    Tobacco use disorder, continuous    Bipolar disorder ([facility] )    Left hip pain    Lactic acidosis    Hypokalemia    Hypomagnesemia    Compression fracture of L4 lumbar vertebra    Thoracic compression fracture (HCC)    Ventral hernia    Parapneumonic effusion    Acute on chronic respiratory failure with hypoxia (HCC)    Chronic respiratory failure (HCC)    Hypophosphatemia    Elevated MCV    Schizoaffective disorder, bipolar type (HCC)    Acquired hypothyroidism    Gastroesophageal reflux disease without esophagitis    Abnormal CT of the chest    Excessive cerumen in left ear canal    Lipoma of right upper extremity    Polydipsia    Localized swelling of both lower legs    Noncompliant with deep vein thrombosis (DVT) prophylaxis    Allergic conjunctivitis of right eye    At risk for aspiration    Ear ache       Problem list reviewed 02/08/20     Objective:     Vital Signs:  Vitals:    02/07/20 0700 02/07/20 1633 02/07/20 1956 02/08/20 0730   BP: 100/65 129/57 92/60 100/62   BP Location: Right arm Left arm Left arm Left arm   Pulse: 75 83 90 91   Resp: 17 18 16 16   Temp: 97 6 °F (36 4 °C) (!) 97 °F (36 1 °C)  97 7 °F (36 5 °C)   TempSrc:  Temporal  Temporal   SpO2:  94% 94%    Weight:       Height:             Appearance:  age appropriate, casually dressed and disheveled   Behavior:  normal   Speech:  normal volume   Mood:  anxious   Affect:  constricted   Thought Process:  normal   Thought Content:  delusions  persecutory   Perceptual Disturbances: None   Risk Potential: none   Sensorium:  person, place and time   Cognition:  intact   Consciousness:  alert and awake    Attention: attention span and concentration were age appropriate   Intellect: average   Insight:  poor   Judgment: poor      Motor Activity: no abnormal movements       I/O Past 24 hours:  No intake/output data recorded  No intake/output data recorded          Labs:  Reviewed 02/08/20    Progress Toward Goals:  unchanged    Assessment / Plan:     [facility]ffective disorder[facility] ([facility]    Recommended Treatment: Medication changes:  1) continue current treatment plan    Non-pharmacological treatments  1) Continue with group therapy, milieu therapy and occupational therapy  Safety  1) Safety/communication plan established targeting dynamic risk factors above  2) Risks, benefits, and possible side effects of medications explained to patient and patient verbalizes understanding  Counseling / Coordination of Care    Total floor / unit time spent today 20 minutes  Greater than 50% of total time was spent with the patient and / or family counseling and / or coordination of care  A description of the counseling / coordination of care  Patient's Rights, confidentiality and exceptions to confidentiality, use of automated medical record, [address] [place] [address] staff access to medical record, and consent to treatment reviewed      Prasad Hall PA-C

## 2020-02-08 NOTE — PLAN OF CARE
Problem: Alteration in Thoughts and Perception  Goal: Verbalize thoughts and feelings  Description  Interventions:  - Promote a nonjudgmental and trusting relationship with the patient through active listening and therapeutic communication  - Assess patient's level of functioning, behavior and potential for risk  - Engage patient in 1 on 1 interactions for a minimum of 15 minutes each session  - Encourage patient to express fears, feelings, frustrations, and discuss symptoms    - [place] patient to reality, help patient recognize reality-based thinking   - Administer medications as ordered and assess for potential side effects  - Provide the patient education related to the signs and symptoms of the illness and desired effects of prescribed medications  Outcome: Progressing  Goal: Agree to be compliant with medication regime, as prescribed and report medication side effects  Description  Interventions:  - Offer appropriate PRN medication and supervise ingestion; conduct aims, as needed   Outcome: Progressing     Problem: Alteration in Thoughts and Perception  Goal: Recognize dysfunctional thoughts, communicate reality-based thoughts at the time of discharge  Description  Interventions:  - Provide medication and psycho-education to assist patient in compliance and developing insight into his/her illness   Outcome: Not Progressing     Problem: Depression  Goal: Refrain from isolation  Description  Interventions:  - Develop a trusting relationship   - Encourage socialization   Outcome: Not Progressing     [address] Danie Steiner was visible early in shift sitting in phone chair in [place] when phone not in use  She is pleasant, but, can be intrusively personal in her questions to staff  Came for supper medicine to window  Disputes staff assessment that she ate 100% of meal plus an Ensure  Says ate only a few bites & the Ensure   "Why would I lie?" Maintains, "It's happening again", is unable to eat, fearing choking again, especially after peer's choking episode @ mealtime last evening   Has been isolative to room & bed since conclusion of meal

## 2020-02-08 NOTE — PROGRESS NOTES
[address] Reyes Aashish did not attend PM group  Came out for HS snack, had banana, cookies, milk w/no difficulty swallowing  Wearing now her QHS humidified nasal O2 @ 1L for bed

## 2020-02-08 NOTE — PLAN OF CARE
Problem: Alteration in Thoughts and Perception  Goal: Treatment Goal: Gain control of psychotic behaviors/thinking, reduce/eliminate presenting symptoms and demonstrate improved reality functioning upon discharge  Outcome: Progressing  Goal: Verbalize thoughts and feelings  Description  Interventions:  - Promote a nonjudgmental and trusting relationship with the patient through active listening and therapeutic communication  - Assess patient's level of functioning, behavior and potential for risk  - Engage patient in 1 on 1 interactions for a minimum of 15 minutes each session  - Encourage patient to express fears, feelings, frustrations, and discuss symptoms    - [place] patient to reality, help patient recognize reality-based thinking   - Administer medications as ordered and assess for potential side effects  - Provide the patient education related to the signs and symptoms of the illness and desired effects of prescribed medications  Outcome: Progressing  Goal: Agree to be compliant with medication regime, as prescribed and report medication side effects  Description  Interventions:  - Offer appropriate PRN medication and supervise ingestion; conduct aims, as needed   Outcome: Progressing  Goal: Attend and participate in unit activities, including therapeutic, recreational, and educational groups  Description  Interventions:  - Provide therapeutic and educational activities daily, encourage attendance and participation, and document same in the medical record     CERTIFIED PEER SPECIALIST INTERVENTIONS:    Complete peer assessment with patient to assess their needs and identify their goals to complete while in the recovery program as well as once discharged into the community  Patient will complete WRAP Plan, Crisis Plan and 5 Life Domains  Patient will attend 50% of groups offered on the unit  Patient will complete a goal card weekly      Outcome: Progressing     Problem: Ineffective Coping  Goal: Participates in unit activities  Description  Interventions:  - Provide therapeutic environment   - Provide required programming   - Redirect inappropriate behaviors   Outcome: Progressing  Goal: Patient/Family verbalizes awareness of resources  Outcome: Progressing  Goal: Understands least restrictive measures  Description  Interventions:  - Utilize least restrictive behavior  Outcome: Progressing     Problem: Risk for Self Injury/Neglect  Goal: Treatment Goal: Remain safe during length of stay, learn and adopt new coping skills, and be free of self-injurious ideation, impulses and acts at the time of discharge  Outcome: Progressing  Goal: Verbalize thoughts and feelings  Description  Interventions:  - Assess and re-assess patient's lethality and potential for self-injury  - Engage patient in 1:1 interactions, daily, for a minimum of 15 minutes  - Encourage patient to express feelings, fears, frustrations, hopes  - Establish rapport/trust with patient   Outcome: Progressing  Goal: Refrain from harming self  Description  Interventions:  - Monitor patient closely, per order  - Develop a trusting relationship  - Supervise medication ingestion, monitor effects and side effects   Outcome: Progressing  Goal: Attend and participate in unit activities, including therapeutic, recreational, and educational groups  Description  Interventions:  - Provide therapeutic and educational activities daily, encourage attendance and participation, and document same in the medical record  - Obtain collateral information, encourage visitation and family involvement in care   Outcome: Progressing     Problem: Depression  Goal: Treatment Goal: Demonstrate behavioral control of depressive symptoms, verbalize feelings of improved mood/affect, and adopt new coping skills prior to discharge  Outcome: Progressing  Goal: Verbalize thoughts and feelings  Description  Interventions:  - Assess and re-assess patient's level of risk   - Engage patient in 1:1 interactions, daily, for a minimum of 15 minutes   - Encourage patient to express feelings, fears, frustrations, hopes   Outcome: Progressing     Problem: Anxiety  Goal: Anxiety is at manageable level  Description  Interventions:  - Assess and monitor patient's anxiety level  - Monitor for signs and symptoms of anxiety both physical and emotional (heart palpitations, chest pain, shortness of breath, headaches, nausea, feeling jumpy, restlessness, irritable, apprehensive)  - Collaborate with interdisciplinary team and initiate plan and interventions as ordered    - [place] patient to unit/surroundings  - Explain treatment plan  - Encourage participation in care  - Encourage verbalization of concerns/fears  - Identify coping mechanisms  - Assist in developing anxiety-reducing skills  - Administer/offer alternative therapies  - Limit or eliminate stimulants  Outcome: Progressing     Problem: Alteration in Orientation  Goal: Interact with staff daily  Description  Interventions:  - Assess and re-assess patient's level of orientation  - Engage patient in 1 on 1 interactions, daily, for a minimum of 15 minutes   - Establish rapport/trust with patient   Outcome: Progressing     Problem: PAIN - ADULT  Goal: Verbalizes/displays adequate comfort level or baseline comfort level  Description  Interventions:  - Encourage patient to monitor pain and request assistance  - Assess pain using appropriate pain scale  - Administer analgesics based on type and severity of pain and evaluate response  - Implement non-pharmacological measures as appropriate and evaluate response  - Consider cultural and social influences on pain and pain management  - Notify physician/advanced practitioner if interventions unsuccessful or patient reports new pain  Outcome: Progressing     Problem: SAFETY ADULT  Goal: Patient will remain free of falls  Description  INTERVENTIONS:  - Assess patient frequently for physical needs  -  Identify cognitive and physical deficits and behaviors that affect risk of falls  -  [place] fall precautions as indicated by assessment   - Educate patient/family on patient safety including physical limitations  - Instruct patient to call for assistance with activity based on assessment  - Modify environment to reduce risk of injury  - Consider OT/PT consult to assist with strengthening/mobility  Outcome: Progressing     Problem: Nutrition/Hydration-ADULT  Goal: Nutrient/Hydration intake appropriate for improving, restoring or maintaining nutritional needs  Description  Monitor and assess patient's nutrition/hydration status for malnutrition  Collaborate with interdisciplinary team and initiate plan and interventions as ordered  Monitor patient's weight and dietary intake as ordered or per policy  Utilize nutrition screening tool and intervene as necessary  Determine patient's food preferences and provide high-protein, high-caloric foods as appropriate  INTERVENTIONS:  - Monitor oral intake, urinary output, labs, and treatment plans  - Assess nutrition and hydration status and recommend course of action  - Evaluate amount of meals eaten  - Assist patient with eating if necessary   - Allow adequate time for meals  - Recommend/ encourage appropriate diets, oral nutritional supplements, and vitamin/mineral supplements  - Order, calculate, and assess calorie counts as needed  - Recommend, monitor, and adjust tube feedings and TPN/PPN based on assessed needs  - Assess need for intravenous fluids  - Provide specific nutrition/hydration education as appropriate  - Include patient/family/caregiver in decisions related to nutrition  Outcome: Progressing   Mostly isolative to her bed, out for meds and meals, attended nutrition and journaling groups  Ate 50% of breakfast and 25% of lunch  No somatic complaints voiced, no issues noted  Continue to monitor

## 2020-02-09 NOTE — PROGRESS NOTES
Psychiatry Progress Note    Subjective: Interval History     Patient remains disheveled in her appearance  Continues to lack completing her ADLs  Patient this morning reporting that she is "disgruntled"; had not completed her menu in time yesterday to choose her breakfast options  Felt that staff should have done more this morning and getting her additional food items that as she did not feel she could eat the house tray  Staff continuing to encourage patient to be motivated in her treatment and recovery and policies of the unit  Still minimizes all psychiatric symptoms  Patient has been with poor p o  Intake during breakfast and lunch yesterday but did eat 100% of dinner in drinker Ensure  Has been compliant with her psychotropic medications  Still needs encouragement to participate in the milieu  Slept      Behavior over the last 24 hours:  unchanged  Sleep: normal  Appetite: normal  Medication side effects: No  ROS: no complaints    Current medications:    Current Facility-Administered Medications:     acetaminophen (TYLENOL) tablet 325 mg, 325 mg, Oral, Q6H PRN, Shilpa Trujillo MD    acetaminophen (TYLENOL) tablet 650 mg, 650 mg, Oral, Q6H PRN, Shilpa Trujillo MD    acetaminophen (TYLENOL) tablet 650 mg, 650 mg, Oral, Q8H PRN, Shilpa Trujillo MD    albuterol (PROVENTIL HFA,VENTOLIN HFA) inhaler 2 puff, 2 puff, Inhalation, Q4H PRN, Shilpa Trujillo MD, 2 puff at 10/11/19 0424    aluminum-magnesium hydroxide-simethicone (MYLANTA) 200-200-20 mg/5 mL oral suspension 15 mL, 15 mL, Oral, Q4H PRN, Shilpa Trujillo MD, 15 mL at 01/26/20 1021    ammonium lactate (LAC-HYDRIN) 12 % lotion 1 application, 1 application, Topical, BID PRN, Shilpa Trujillo MD    benzonatate (TESSALON PERLES) capsule 100 mg, 100 mg, Oral, TID PRN, Shilpa Trujillo MD    benztropine (COGENTIN) injection 1 mg, 1 mg, Intramuscular, Q8H PRN, Shilpa Trujillo MD    carbamide peroxide (DEBROX) 6 5 % otic solution 5 drop, 5 drop, Left Ear, BID PRN, [place] Lava, MD    cloZAPine (CLOZARIL) tablet 25 mg, 25 mg, Oral, BID, Jaz Beasley MD, 25 mg at 02/08/20 2106    cloZAPine (CLOZARIL) tablet 50 mg, 50 mg, Oral, BID, Jaz Beasley MD, 50 mg at 02/08/20 2107    EPINEPHrine PF (ADRENALIN) 1 mg/mL injection 0 15 mg, 0 15 mg, Intramuscular, Once PRN, Jaz Beasley MD    fluticasone-vilanterol (BREO ELLIPTA) 200-25 MCG/INH inhaler 1 puff, 1 puff, Inhalation, Daily, Jaz Beasley MD, 1 puff at 02/09/20 0856    ketotifen (ZADITOR) 0 025 % ophthalmic solution 1 drop, 1 drop, Right Eye, BID PRN, Jaz Beasley MD    levothyroxine tablet 125 mcg, 125 mcg, Oral, Early Morning, Jaz Beasley MD, 125 mcg at 02/09/20 0604    magnesium hydroxide (MILK OF MAGNESIA) 400 mg/5 mL oral suspension 30 mL, 30 mL, Oral, Daily PRN, Jaz Beasley MD    montelukast (SINGULAIR) tablet 10 mg, 10 mg, Oral, HS, Jaz Beasley MD, 10 mg at 01/16/20 2106    OLANZapine (ZyPREXA) IM injection 5 mg, 5 mg, Intramuscular, Q8H PRN, Jaz Beasley MD    OLANZapine (ZyPREXA) tablet 5 mg, 5 mg, Oral, Q8H PRN, Jaz Beasley MD    ondansetron (ZOFRAN-ODT) dispersible tablet 4 mg, 4 mg, Oral, Q6H PRN, Jaz Beasley MD, 4 mg at 12/09/19 1757    pantoprazole (PROTONIX) EC tablet 40 mg, 40 mg, Oral, Early Morning, Jaz Beasley MD, 40 mg at 02/09/20 0604    polyethylene glycol (MIRALAX) packet 17 g, 17 g, Oral, Daily PRN, Jaz Beasley MD    polyvinyl alcohol (LIQUIFILM TEARS) 1 4 % ophthalmic solution 1 drop, 1 drop, Both Eyes, Q3H PRN, Jaz Beasley MD    sertraline (ZOLOFT) tablet 150 mg, 150 mg, Oral, Daily, Jaz Beasley MD, 150 mg at 02/09/20 0856    sucralfate (CARAFATE) oral suspension 1,000 mg, 1,000 mg, Oral, BID, Cat Torres MD, 1,000 mg at 02/09/20 0604    theophylline (JEF-24) 24 hr capsule 200 mg, 200 mg, Oral, Daily, Jaz Beasley MD, 200 mg at 02/09/20 0856    tiotropium (SPIRIVA) capsule for inhaler 18 mcg, 18 mcg, Inhalation, Daily, Jaz Beasley MD, 18 mcg at 02/09/20 0856    traZODone (Dennie Speed) tablet 25 mg, 25 mg, Oral, HS PRN, Deep Durand MD    Current Problem List:    Patient Active Problem List   Diagnosis    [facility]epsis [facility]    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    Tobacco use disorder, continuous    [facility]    Left hip pain    Lactic acidosis    Hypokalemia    Hypomagnesemia    Compression fracture of L4 lumbar vertebra    Thoracic compression fracture (HCC)    Ventral hernia    Parapneumonic effusion    Acute on chronic respiratory failure with hypoxia (HCC)    Chronic respiratory failure (HCC)    Hypophosphatemia    Elevated MCV    Schizoaffective disorder, bipolar type ([facility]    Acquired hypothyroidism    Gastroesophageal reflux disease without esophagitis    Abnormal CT of the chest    Excessive cerumen in left ear canal    Lipoma of right upper extremity    Polydipsia    Localized swelling of both lower legs    Noncompliant with deep vein thrombosis (DVT) prophylaxis    Allergic conjunctivitis of right eye    At risk for aspiration    Ear ache       Problem list reviewed 02/09/20     Objective:     Vital Signs:  Vitals:    02/08/20 2000 02/08/20 2158 02/09/20 0700 02/09/20 0705   BP: 92/68  125/73 98/59   BP Location: Left arm  Left arm Left arm   Pulse: 102 97 93 102   Resp: 15  20    Temp: 97 5 °F (36 4 °C)  97 5 °F (36 4 °C)    TempSrc: Temporal  Temporal    SpO2: 94% 91%     Weight:   66 7 kg (147 lb)    Height:             Appearance:  age appropriate, casually dressed and disheveled   Behavior:  normal   Speech:  normal volume   Mood:  anxious   Affect:  constricted   Thought Process:  normal   Thought Content:  delusions  persecutory   Perceptual Disturbances: None   Risk Potential: none   Sensorium:  person, place and time   Cognition:  intact   Consciousness:  alert and awake    Attention: attention span and concentration were age appropriate   Intellect: average   Insight:  poor   Judgment: poor      Motor Activity: no abnormal movements       I/O Past 24 hours: No intake/output data recorded  No intake/output data recorded  Labs:  Reviewed 02/09/20    Progress Toward Goals:  unchanged    Assessment / Plan:     Schizoaffective disorder, bipolar type ([facility] )    Recommended Treatment:      Medication changes:  1) continue current treatment plan    Non-pharmacological treatments  1) Continue with group therapy, milieu therapy and occupational therapy  Safety  1) Safety/communication plan established targeting dynamic risk factors above  2) Risks, benefits, and possible side effects of medications explained to patient and patient verbalizes understanding  Counseling / Coordination of Care    Total floor / unit time spent today 20 minutes  Greater than 50% of total time was spent with the patient and / or family counseling and / or coordination of care  A description of the counseling / coordination of care  Patient's Rights, confidentiality and exceptions to confidentiality, use of automated medical record, [facility], [facility] staff access to medical record, and consent to treatment reviewed      Willi Dillon PA-C

## 2020-02-09 NOTE — PLAN OF CARE
Problem: Alteration in Thoughts and Perception  Goal: Verbalize thoughts and feelings  Description  Interventions:  - Promote a nonjudgmental and trusting relationship with the patient through active listening and therapeutic communication  - Assess patient's level of functioning, behavior and potential for risk  - Engage patient in 1 on 1 interactions for a minimum of 15 minutes each session  - Encourage patient to express fears, feelings, frustrations, and discuss symptoms    - [place] patient to reality, help patient recognize reality-based thinking   - Administer medications as ordered and assess for potential side effects  - Provide the patient education related to the signs and symptoms of the illness and desired effects of prescribed medications  Outcome: Progressing  Goal: Agree to be compliant with medication regime, as prescribed and report medication side effects  Description  Interventions:  - Offer appropriate PRN medication and supervise ingestion; conduct aims, as needed   Outcome: Progressing     Problem: Ineffective Coping  Goal: Patient/Family verbalizes awareness of resources  Outcome: Progressing  Goal: Understands least restrictive measures  Description  Interventions:  - Utilize least restrictive behavior  Outcome: Progressing     Problem: Risk for Self Injury/Neglect  Goal: Treatment Goal: Remain safe during length of stay, learn and adopt new coping skills, and be free of self-injurious ideation, impulses and acts at the time of discharge  Outcome: Progressing  Goal: Verbalize thoughts and feelings  Description  Interventions:  - Assess and re-assess patient's lethality and potential for self-injury  - Engage patient in 1:1 interactions, daily, for a minimum of 15 minutes  - Encourage patient to express feelings, fears, frustrations, hopes  - Establish rapport/trust with patient   Outcome: Progressing  Goal: Refrain from harming self  Description  Interventions:  - Monitor patient closely, per order  - Develop a trusting relationship  - Supervise medication ingestion, monitor effects and side effects   Outcome: Progressing     Problem: Depression  Goal: Verbalize thoughts and feelings  Description  Interventions:  - Assess and re-assess patient's level of risk   - Engage patient in 1:1 interactions, daily, for a minimum of 15 minutes   - Encourage patient to express feelings, fears, frustrations, hopes   Outcome: Progressing     Problem: Alteration in Orientation  Goal: Interact with staff daily  Description  Interventions:  - Assess and re-assess patient's level of orientation  - Engage patient in 1 on 1 interactions, daily, for a minimum of 15 minutes   - Establish rapport/trust with patient   Outcome: Progressing     Problem: PAIN - ADULT  Goal: Verbalizes/displays adequate comfort level or baseline comfort level  Description  Interventions:  - Encourage patient to monitor pain and request assistance  - Assess pain using appropriate pain scale  - Administer analgesics based on type and severity of pain and evaluate response  - Implement non-pharmacological measures as appropriate and evaluate response  - Consider cultural and social influences on pain and pain management  - Notify physician/advanced practitioner if interventions unsuccessful or patient reports new pain  Outcome: Progressing     Problem: SAFETY ADULT  Goal: Patient will remain free of falls  Description  INTERVENTIONS:  - Assess patient frequently for physical needs  -  Identify cognitive and physical deficits and behaviors that affect risk of falls    -  [place] fall precautions as indicated by assessment   - Educate patient/family on patient safety including physical limitations  - Instruct patient to call for assistance with activity based on assessment  - Modify environment to reduce risk of injury  - Consider OT/PT consult to assist with strengthening/mobility  Outcome: Progressing     Problem: Alteration in Thoughts and Perception  Goal: Treatment Goal: Gain control of psychotic behaviors/thinking, reduce/eliminate presenting symptoms and demonstrate improved reality functioning upon discharge  Outcome: Not Progressing  Goal: Attend and participate in unit activities, including therapeutic, recreational, and educational groups  Description  Interventions:  - Provide therapeutic and educational activities daily, encourage attendance and participation, and document same in the medical record     CERTIFIED PEER SPECIALIST INTERVENTIONS:    Complete peer assessment with patient to assess their needs and identify their goals to complete while in the recovery program as well as once discharged into the community  Patient will complete WRAP Plan, Crisis Plan and [facility] Domains  Patient will attend 50% of groups offered on the unit  Patient will complete a goal card weekly      Outcome: Not Progressing  Goal: Recognize dysfunctional thoughts, communicate reality-based thoughts at the time of discharge  Description  Interventions:  - Provide medication and psycho-education to assist patient in compliance and developing insight into his/her illness   Outcome: Not Progressing  Goal: Complete daily ADLs, including personal hygiene independently, as able  Description  Interventions:  - Observe, teach, and assist patient with ADLS  - Monitor and promote a balance of rest/activity, with adequate nutrition and elimination   Outcome: Not Progressing     Problem: Ineffective Coping  Goal: Demonstrates healthy coping skills  Outcome: Not Progressing  Goal: Participates in unit activities  Description  Interventions:  - Provide therapeutic environment   - Provide required programming   - Redirect inappropriate behaviors   Outcome: Not Progressing     Problem: Risk for Self Injury/Neglect  Goal: Attend and participate in unit activities, including therapeutic, recreational, and educational groups  Description  Interventions:  - Provide therapeutic and educational activities daily, encourage attendance and participation, and document same in the medical record  - Obtain collateral information, encourage visitation and family involvement in care   Outcome: Not Progressing  Goal: Recognize maladaptive responses and adopt new coping mechanisms  Outcome: Not Progressing  Goal: Complete daily ADLs, including personal hygiene independently, as able  Description  Interventions:  - Observe, teach, and assist patient with ADLS  - Monitor and promote a balance of rest/activity, with adequate nutrition and elimination  Outcome: Not Progressing     Problem: Depression  Goal: Treatment Goal: Demonstrate behavioral control of depressive symptoms, verbalize feelings of improved mood/affect, and adopt new coping skills prior to discharge  Outcome: Not Progressing  Goal: Refrain from isolation  Description  Interventions:  - Develop a trusting relationship   - Encourage socialization   Outcome: Not Progressing  Goal: Refrain from self-neglect  Outcome: Not Progressing     Problem: Anxiety  Goal: Anxiety is at manageable level  Description  Interventions:  - Assess and monitor patient's anxiety level  - Monitor for signs and symptoms of anxiety both physical and emotional (heart palpitations, chest pain, shortness of breath, headaches, nausea, feeling jumpy, restlessness, irritable, apprehensive)  - Collaborate with interdisciplinary team and initiate plan and interventions as ordered    - [place] patient to unit/surroundings  - Explain treatment plan  - Encourage participation in care  - Encourage verbalization of concerns/fears  - Identify coping mechanisms  - Assist in developing anxiety-reducing skills  - Administer/offer alternative therapies  - Limit or eliminate stimulants  Outcome: Not Progressing     Problem: Nutrition/Hydration-ADULT  Goal: Nutrient/Hydration intake appropriate for improving, restoring or maintaining nutritional needs  Description  Monitor and assess patient's nutrition/hydration status for malnutrition  Collaborate with interdisciplinary team and initiate plan and interventions as ordered  Monitor patient's weight and dietary intake as ordered or per policy  Utilize nutrition screening tool and intervene as necessary  Determine patient's food preferences and provide high-protein, high-caloric foods as appropriate  INTERVENTIONS:  - Monitor oral intake, urinary output, labs, and treatment plans  - Assess nutrition and hydration status and recommend course of action  - Evaluate amount of meals eaten  - Assist patient with eating if necessary   - Allow adequate time for meals  - Recommend/ encourage appropriate diets, oral nutritional supplements, and vitamin/mineral supplements  - Order, calculate, and assess calorie counts as needed  - Recommend, monitor, and adjust tube feedings and TPN/PPN based on assessed needs  - Assess need for intravenous fluids  - Provide specific nutrition/hydration education as appropriate  - Include patient/family/caregiver in decisions related to nutrition  Outcome: Not Progressing   Mostly isolative to her bed, napping for most of the day, and did not attend any groups  Came out of her room for meds and meals only but ate poorly  Ate 25% of both breakfast and lunch and explained that she cannot eat most foods because she will choke on them like her peer did the other day  "I saw her choke and now I'm afraid I'm going to choke " When staff attempted to rationalize that situation and how it relates to her with her, the patient became dismissive and walked away  No other behaviors or issues noted  Continue to monitor

## 2020-02-09 NOTE — PLAN OF CARE
Problem: Alteration in Thoughts and Perception  Goal: Verbalize thoughts and feelings  Description  Interventions:  - Promote a nonjudgmental and trusting relationship with the patient through active listening and therapeutic communication  - Assess patient's level of functioning, behavior and potential for risk  - Engage patient in 1 on 1 interactions for a minimum of 15 minutes each session  - Encourage patient to express fears, feelings, frustrations, and discuss symptoms    - [place] patient to reality, help patient recognize reality-based thinking   - Administer medications as ordered and assess for potential side effects  - Provide the patient education related to the signs and symptoms of the illness and desired effects of prescribed medications  Outcome: Progressing  Goal: Agree to be compliant with medication regime, as prescribed and report medication side effects  Description  Interventions:  - Offer appropriate PRN medication and supervise ingestion; conduct aims, as needed   Outcome: Progressing     Problem: Alteration in Orientation  Goal: Interact with staff daily  Description  Interventions:  - Assess and re-assess patient's level of orientation  - Engage patient in 1 on 1 interactions, daily, for a minimum of 15 minutes   - Establish rapport/trust with patient   Outcome: Progressing     Problem: Alteration in Thoughts and Perception  Goal: Recognize dysfunctional thoughts, communicate reality-based thoughts at the time of discharge  Description  Interventions:  - Provide medication and psycho-education to assist patient in compliance and developing insight into his/her illness   Outcome: Not Progressing  Goal: Complete daily ADLs, including personal hygiene independently, as able  Description  Interventions:  - Observe, teach, and assist patient with ADLS  - Monitor and promote a balance of rest/activity, with adequate nutrition and elimination   Outcome: Not Progressing     Problem: Depression  Goal: Refrain from isolation  Description  Interventions:  - Develop a trusting relationship   - Encourage socialization   Outcome: Not Progressing  Goal: Refrain from self-neglect  Outcome: Not Ani Kahn has been in her room most of the shift  Napping at times  Disheveled appearance  Pleasant and cooperative upon approach  Did incentive spirometer  She was able to get to 1250 ml's without difficulty  Came for medication without prompting  Swallowed pills without an issue  Ate 25% of her meal and drank 100% of Ensure  No mention of being afraid of choking  Swallowed mash potatoes and ice cream without difficulty  No BM or shower tonight  Attended evening group  Took HS medication with prompting  Ate snack  Oxygen saturation 90% on room air prior to Oxygen 1 liter via  nasal cannula  Continue to monitor  Precautions maintained

## 2020-02-10 PROBLEM — H92.09 EAR ACHE: Status: RESOLVED | Noted: 2019-01-01 | Resolved: 2020-01-01

## 2020-02-10 NOTE — PROGRESS NOTES
BRADY GROUP NOTE  Sat quietly for majority of group   Able to state goals, which included,  "Keep going to group", "showering", off grounds w/staff      02/10/20 0900   Activity/Group Checklist   Group Community meeting  (Did not remain for morning stretch )   Attendance Attended   Attendance Duration (min) 0-15   Interactions Interacted appropriately   Affect/Mood Appropriate   Objectives/Key Points:  Living intentionally  Goal Setting  Thought for the Day  Self Check In

## 2020-02-10 NOTE — ASSESSMENT & PLAN NOTE
Patient claims to be somewhat sad depressed and is again somewhat psychosomatic and tends to be act is a tree to some of the staff and states she will try to take a shower this evening for schedule outing tomorrow morning with staff escort to the community  She has been compliant with medications  Eating and sleeping well  Still scared that she might choke despite reminding her that she needs to be careful and tried to cut up the food as small pieces to eat  Compliant with medications  No overt delusions elicited otherwise  Denies any bouts of crying spells or excessive depression the than some low motivation     Current medications:    Current Facility-Administered Medications:     acetaminophen (TYLENOL) tablet 325 mg, 325 mg, Oral, Q6H PRN, Shi Pham MD    acetaminophen (TYLENOL) tablet 650 mg, 650 mg, Oral, Q6H PRN, Shi Pham MD    acetaminophen (TYLENOL) tablet 650 mg, 650 mg, Oral, Q8H PRN, Shi Pham MD    albuterol (PROVENTIL HFA,VENTOLIN HFA) inhaler 2 puff, 2 puff, Inhalation, Q4H PRN, Shi Pham MD, 2 puff at 10/11/19 0424    aluminum-magnesium hydroxide-simethicone (MYLANTA) 200-200-20 mg/5 mL oral suspension 15 mL, 15 mL, Oral, Q4H PRN, Shi Pham MD, 15 mL at 01/26/20 1021    ammonium lactate (LAC-HYDRIN) 12 % lotion 1 application, 1 application, Topical, BID PRN, Shi Pham MD    benzonatate (TESSALON PERLES) capsule 100 mg, 100 mg, Oral, TID PRN, Shi Pham MD    benztropine (COGENTIN) injection 1 mg, 1 mg, Intramuscular, Q8H PRN, Shi Pham MD    carbamide peroxide (DEBROX) 6 5 % otic solution 5 drop, 5 drop, Left Ear, BID PRN, Christ Kc MD    cloZAPine (CLOZARIL) tablet 25 mg, 25 mg, Oral, BID, Shi Pham MD, 25 mg at 02/09/20 2041    cloZAPine (CLOZARIL) tablet 50 mg, 50 mg, Oral, BID, Shi Pham MD, 50 mg at 02/09/20 2041    EPINEPHrine PF (ADRENALIN) 1 mg/mL injection 0 15 mg, 0 15 mg, Intramuscular, Once PRN, Carmacie Pham MD    fluticasone-vilanterol (BREO ELLIPTA) 200-25 MCG/INH inhaler 1 puff, 1 puff, Inhalation, Daily, Isidoro Gonzalez MD, 1 puff at 02/10/20 0837    ketotifen (ZADITOR) 0 025 % ophthalmic solution 1 drop, 1 drop, Right Eye, BID PRN, Isidoro Gonzalez MD    levothyroxine tablet 125 mcg, 125 mcg, Oral, Early Morning, Isidoro Gonzalez MD, 125 mcg at 02/10/20 0602    magnesium hydroxide (MILK OF MAGNESIA) 400 mg/5 mL oral suspension 30 mL, 30 mL, Oral, Daily PRN, Isidoro Gonzalez MD    montelukast (SINGULAIR) tablet 10 mg, 10 mg, Oral, HS, Isidoro Gonzalez MD, 10 mg at 01/16/20 2106    OLANZapine (ZyPREXA) IM injection 5 mg, 5 mg, Intramuscular, Q8H PRN, Isidoro Gonzalez MD    OLANZapine (ZyPREXA) tablet 5 mg, 5 mg, Oral, Q8H PRN, Isidoro Gonzalez MD    ondansetron (ZOFRAN-ODT) dispersible tablet 4 mg, 4 mg, Oral, Q6H PRN, Isidoro Gonzalez MD, 4 mg at 12/09/19 1757    pantoprazole (PROTONIX) EC tablet 40 mg, 40 mg, Oral, Early Morning, Isidoro Gonzalez MD, 40 mg at 02/09/20 0604    polyethylene glycol (MIRALAX) packet 17 g, 17 g, Oral, Daily PRN, Isidoro Gonzalez MD    polyvinyl alcohol (LIQUIFILM TEARS) 1 4 % ophthalmic solution 1 drop, 1 drop, Both Eyes, Q3H PRN, Isidoro Gonzalez MD    sertraline (ZOLOFT) tablet 150 mg, 150 mg, Oral, Daily, Isidoro Gonzalez MD, 150 mg at 02/10/20 8817    sucralfate (CARAFATE) oral suspension 1,000 mg, 1,000 mg, Oral, BID, Cat Torres MD, 1,000 mg at 02/09/20 1639    theophylline (JEF-24) 24 hr capsule 200 mg, 200 mg, Oral, Daily, Isidoro Gnozalez MD, 200 mg at 02/10/20 9640    tiotropium [facility]) capsule for inhaler 18 mcg, 18 mcg, Inhalation, Daily, Isidoro Gonzalez MD, 18 mcg at 02/10/20 9619    traZODone (DESYREL) tablet 25 mg, 25 mg, Oral, HS PRN, Isidoro Gonzalez MD  Justification if on more than two antipsychotics: not applicable  Side effects if any: none    Risks , benefits, side effects and precautions of medications discussed with patient and reminded patient to let us know any problems with medications     Objective:     Vital Signs:  Vitals:    02/09/20 1600 02/1955 02/10/20 0700 02/10/20 0705   BP: 103/64 90/60 110/63 106/67   BP Location: Left arm Left arm Left arm Left arm   Pulse: 76 70 82 80   Resp: 14 14 18 18   Temp: (!) 97 1 °F (36 2 °C) (!) 97 2 °F (36 2 °C) 97 6 °F (36 4 °C)    TempSrc: Temporal Temporal Temporal    SpO2: 94% 92%  92%   Weight:       Height:         Appearance:  age appropriate, casually dressed and older than stated agepoorly groomed but better eye contact and hair uncombed   Behavior:  deviant, evasive and guarded suspicious and paranoid   Speech:  delayed, increased latency of response and tangential    Mood:  anxious, euphoric and irritable    Affect:  increased in intensity, increased in range, mood-congruent and redirectable   Thought Process:  circumstantial, concrete, disorganized, goal directed, illogical and perserverative with stilted speech   Thought Content:  delusions  grandiose and persecutory  remains suspicious and accusatory to certain staff off and on  No preoccupation with violence  No current suicidal homicidal thoughts intent or plans reported  No obsessions or compulsions   Perceptual Disturbances: None    Risk Potential: Inability to care for herself and refusal to take showers regularly   Sensorium:  person, place, time/date, situation, day of week, month of year, year and time   Cognition:  grossly intact the no problems with recent or remote memory  Aware of current events  No language deficits   Consciousness:  alert and awake    Attention: Fair   Intellect: Average   Insight:  Fair   Judgment: limited due to refusal to attend to ADLs skills regularly      Motor Activity: no abnormal movements         Recent Labs:  Results Reviewed     None          I/O Past 24 hours:  No intake/output data recorded  No intake/output data recorded          Assessment / Plan:     Schizoaffective disorder, bipolar type [facility])      Reason for continued inpatient care: to assess ability to maintain improvement by attending to ADLs and taking showers regular and see how she does on outing with family after another outing with staff escort next week  Acceptance by patient: accepting now  Hopefulness in recovery: living at the [facility] soon  Understanding of medications :  yes  Involved in reintegration process: attending groups and has off grounds and off unit privileges  Trusting in relatoinship with psychiatrist:  Nicola Robledo now    Recommended Treatment:    Medication changes:  1) increase Zoloft as 175 mg a day to see if that would improve her motivation    Non-pharmacological treatments  1) Continue with individual therapy, group therapy, milieu therapy and occupational therapy using recovery principles and psycho-education about accepting illness and need for treatment   2) encourage to take showers twice a week and cooperate with treatment and adl skills as per her behav  plan  3) reschedule another pass with staff to community next week on Wednesday before pass with sister to see if she would fully comply   Safety  1) Safety/communication plan established targeting dynamic risk factors above  [facility] [address] / Coordination of Care    Total floor / unit time spent today 15 minutes  Greater than 50% of total time was spent with the patient and / or family counseling and / or coordination of care  Receptive to listening and learning coping skills for management of symptoms and attending to ADLs  Patient's Rights, confidentiality and exceptions to confidentiality, use of automated medical record, Jeb Patrick staff access to medical record, and consent to treatment reviewed      Isidoro Gonzalez MD

## 2020-02-10 NOTE — PROGRESS NOTES
Pt received @ 2300  Sleeping since beginning of shift w/out any signs of respiratory distress  Remains on O2 via n/c  No behavioral issues, will continue to monitor

## 2020-02-10 NOTE — PROGRESS NOTES
02/10/20 0900   Team Meeting   Meeting Type Daily Rounds   Team Members Present   Team Members Present Physician;Nurse;; Other (Discipline and Name)   Physician Team Member Dr Ricky Manjarrez, RN   Care Management Team Member Brenda Muro   Other (Discipline and Name) Yudi Hamilton LCSW     Patient's last shower was on 2/5  Reports she is afraid of choking due to witnessing another patient choking on the unit  Slept

## 2020-02-10 NOTE — PROGRESS NOTES
Progress Note - Gigi Jenkins 1955, 60 y o  female MRN: [id number]    Unit/Bed#: [facility] 110-02 Encounter: [id number]    Primary Care Provider: Bonnee Favre, PA-C   Date and time admitted to hospital: 7/23/2019  5:30 PM        * Schizoaffective disorder, bipolar type [facility])  Assessment & Plan  Patient claims to be somewhat sad depressed and is again somewhat psychosomatic and tends to be act is a tree to some of the staff and states she will try to take a shower this evening for schedule outing tomorrow morning with staff escort to the community  She has been compliant with medications  Eating and sleeping well  Still scared that she might choke despite reminding her that she needs to be careful and tried to cut up the food as small pieces to eat  Compliant with medications  No overt delusions elicited otherwise  Denies any bouts of crying spells or excessive depression the than some low motivation     Current medications:    Current Facility-Administered Medications:     acetaminophen (TYLENOL) tablet 325 mg, 325 mg, Oral, Q6H PRN, Faheem Daniels MD    acetaminophen (TYLENOL) tablet 650 mg, 650 mg, Oral, Q6H PRN, Faheem Daniels MD    acetaminophen (TYLENOL) tablet 650 mg, 650 mg, Oral, Q8H PRN, Faheem Daniels MD    albuterol (PROVENTIL HFA,VENTOLIN HFA) inhaler 2 puff, 2 puff, Inhalation, Q4H PRN, Faheem Daniels MD, 2 puff at 10/11/19 0424    aluminum-magnesium hydroxide-simethicone (MYLANTA) 200-200-20 mg/5 mL oral suspension 15 mL, 15 mL, Oral, Q4H PRN, Faheem Daniels MD, 15 mL at 01/26/20 1021    ammonium lactate (LAC-HYDRIN) 12 % lotion 1 application, 1 application, Topical, BID PRN, Faheem Daniels MD    benzonatate (TESSALON PERLES) capsule 100 mg, 100 mg, Oral, TID PRN, Faheem Daniels MD    benztropine (COGENTIN) injection 1 mg, 1 mg, Intramuscular, Q8H PRN, Faheem Daniels MD    carbamide peroxide (DEBROX) 6 5 % otic solution 5 drop, 5 drop, Left Ear, BID PRN, Anna Arana MD    cloZAPine (CLOZARIL) tablet 25 mg, 25 mg, Oral, BID, Tree Madden MD, 25 mg at 02/09/20 2041    cloZAPine (CLOZARIL) tablet 50 mg, 50 mg, Oral, BID, Tree Madden MD, 50 mg at 02/09/20 2041    EPINEPHrine PF (ADRENALIN) 1 mg/mL injection 0 15 mg, 0 15 mg, Intramuscular, Once PRN, Tree Madden MD    fluticasone-vilanterol (BREO ELLIPTA) 200-25 MCG/INH inhaler 1 puff, 1 puff, Inhalation, Daily, Tree Madden MD, 1 puff at 02/10/20 0837    ketotifen (ZADITOR) 0 025 % ophthalmic solution 1 drop, 1 drop, Right Eye, BID PRN, Tree Madden MD    levothyroxine tablet 125 mcg, 125 mcg, Oral, Early Morning, Tree Madden MD, 125 mcg at 02/10/20 0602    magnesium hydroxide (MILK OF MAGNESIA) 400 mg/5 mL oral suspension 30 mL, 30 mL, Oral, Daily PRN, Tree Madden MD    montelukast (SINGULAIR) tablet 10 mg, 10 mg, Oral, HS, Tree Madden MD, 10 mg at 01/16/20 2106    OLANZapine (ZyPREXA) IM injection 5 mg, 5 mg, Intramuscular, Q8H PRN, Tree Madden MD    OLANZapine (ZyPREXA) tablet 5 mg, 5 mg, Oral, Q8H PRN, Tree Madden MD    ondansetron (ZOFRAN-ODT) dispersible tablet 4 mg, 4 mg, Oral, Q6H PRN, Tree Madden MD, 4 mg at 12/09/19 1757    pantoprazole (PROTONIX) EC tablet 40 mg, 40 mg, Oral, Early Morning, Tree Madden MD, 40 mg at 02/09/20 0604    polyethylene glycol (MIRALAX) packet 17 g, 17 g, Oral, Daily PRN, Tree Madden MD    polyvinyl alcohol (LIQUIFILM TEARS) 1 4 % ophthalmic solution 1 drop, 1 drop, Both Eyes, Q3H PRN, Tree Madden MD    sertraline (ZOLOFT) tablet 150 mg, 150 mg, Oral, Daily, Tree Madden MD, 150 mg at 02/10/20 6817    sucralfate (CARAFATE) oral suspension 1,000 mg, 1,000 mg, Oral, BID, Cat Torres MD, 1,000 mg at 02/09/20 1639    theophylline (JEF-24) 24 hr capsule 200 mg, 200 mg, Oral, Daily, Tree Madden MD, 200 mg at 02/10/20 0838    tiotropium [facility]) capsule for inhaler 18 mcg, 18 mcg, Inhalation, Daily, Tree Madden MD, 18 mcg at 02/10/20 0837    traZODone (DESYREL) tablet 25 mg, 25 mg, Oral, HS PRN, Allie Guzman MD  Justification if on more than two antipsychotics: not applicable  Side effects if any: none    Risks , benefits, side effects and precautions of medications discussed with patient and reminded patient to let us know any problems with medications     Objective:     Vital Signs:  Vitals:    02/09/20 1600 02/1955 02/10/20 0700 02/10/20 0705   BP: 103/64 90/60 110/63 106/67   BP Location: Left arm Left arm Left arm Left arm   Pulse: 76 70 82 80   Resp: 14 14 18 18   Temp: (!) 97 1 °F (36 2 °C) (!) 97 2 °F (36 2 °C) 97 6 °F (36 4 °C)    TempSrc: Temporal Temporal Temporal    SpO2: 94% 92%  92%   Weight:       Height:         Appearance:  age appropriate, casually dressed and older than stated agepoorly groomed but better eye contact and hair uncombed   Behavior:  deviant, evasive and guarded suspicious and paranoid   Speech:  delayed, increased latency of response and tangential    Mood:  anxious, euphoric and irritable    Affect:  increased in intensity, increased in range, mood-congruent and redirectable   Thought Process:  circumstantial, concrete, disorganized, goal directed, illogical and perserverative with stilted speech   Thought Content:  delusions  grandiose and persecutory  remains suspicious and accusatory to certain staff off and on  No preoccupation with violence  No current suicidal homicidal thoughts intent or plans reported  No obsessions or compulsions   Perceptual Disturbances: None    Risk Potential: Inability to care for herself and refusal to take showers regularly   Sensorium:  person, place, time/date, situation, day of week, month of year, year and time   Cognition:  grossly intact the no problems with recent or remote memory  Aware of current events    No language deficits   Consciousness:  alert and awake    Attention: Fair   Intellect: Average   Insight:  Fair   Judgment: limited due to refusal to attend to ADLs skills regularly      Motor Activity: no abnormal movements         Recent Labs:  Results Reviewed     None          I/O Past 24 hours:  No intake/output data recorded  No intake/output data recorded  Assessment / Plan:     Schizoaffective disorder, bipolar type ([address] )      Reason for continued inpatient care: to assess ability to maintain improvement by attending to ADLs and taking showers regular and see how she does on outing with family after another outing with staff escort next week  Acceptance by patient: accepting now  Hopefulness in recovery: living at the [facility] soon  Understanding of medications :  yes  Involved in reintegration process: attending groups and has off grounds and off unit privileges  Trusting in relatoinship with psychiatrist:  Freda Cramer now    Recommended Treatment:    Medication changes:  1) increase Zoloft as 175 mg a day to see if that would improve her motivation    Non-pharmacological treatments  1) Continue with individual therapy, group therapy, milieu therapy and occupational therapy using recovery principles and psycho-education about accepting illness and need for treatment   2) encourage to take showers twice a week and cooperate with treatment and adl skills as per her behav  plan  3) reschedule another pass with staff to community next week on Wednesday before pass with sister to see if she would fully comply   Safety  1) Safety/communication plan established targeting dynamic risk factors above  Discharge Plan to a [facility] at [address] [facility] / Coordination of Care    Total floor / unit time spent today 15 minutes  Greater than 50% of total time was spent with the patient and / or family counseling and / or coordination of care  Receptive to listening and learning coping skills for management of symptoms and attending to ADLs       Patient's Rights, confidentiality and exceptions to confidentiality, use of automated medical record, [facility] staff access to medical record, and consent to treatment reviewed      Jaz Beasley MD

## 2020-02-10 NOTE — PROGRESS NOTES
02/10/20 1100   Activity/Group Checklist   Group Other (Comment)  (IMR)   Attendance Attended   Attendance Duration (min) 46-60   Interactions Interacted appropriately   Affect/Mood Appropriate   Goals Achieved Identified feelings; Identified triggers; Able to listen to others; Able to self-disclose     Patient attended group today  Patient was able to interact appropriately and participated during the group session  Session focused on how to deal with difficult circumstances and how to practice positive self-talk during periods of anxiety or depression  Patient was able to self-disclose and talked about the challenges that she has experienced with agoraphobia  Patient was able to talk about the coping tools that she has used in the past and ways to deal with the anxiety in the future  Patient did well with interacting and being respectful of others

## 2020-02-10 NOTE — PLAN OF CARE
Problem: Alteration in Thoughts and Perception  Goal: Verbalize thoughts and feelings  Description  Interventions:  - Promote a nonjudgmental and trusting relationship with the patient through active listening and therapeutic communication  - Assess patient's level of functioning, behavior and potential for risk  - Engage patient in 1 on 1 interactions for a minimum of 15 minutes each session  - Encourage patient to express fears, feelings, frustrations, and discuss symptoms    - Doole patient to reality, help patient recognize reality-based thinking   - Administer medications as ordered and assess for potential side effects  - Provide the patient education related to the signs and symptoms of the illness and desired effects of prescribed medications  Outcome: Progressing  Goal: Agree to be compliant with medication regime, as prescribed and report medication side effects  Description  Interventions:  - Offer appropriate PRN medication and supervise ingestion; conduct aims, as needed   Outcome: Progressing  Goal: Attend and participate in unit activities, including therapeutic, recreational, and educational groups  Description  Interventions:  - Provide therapeutic and educational activities daily, encourage attendance and participation, and document same in the medical record     CERTIFIED PEER SPECIALIST INTERVENTIONS:    Complete peer assessment with patient to assess their needs and identify their goals to complete while in the recovery program as well as once discharged into the community  Patient will complete WRAP Plan, Crisis Plan and [facility] [employer]  Patient will attend 50% of groups offered on the unit  Patient will complete a goal card weekly      Outcome: Progressing     Problem: Ineffective Coping  Goal: Participates in unit activities  Description  Interventions:  - Provide therapeutic environment   - Provide required programming   - Redirect inappropriate behaviors   Outcome: Progressing  Goal: Patient/Family verbalizes awareness of resources  Outcome: Progressing  Goal: Understands least restrictive measures  Description  Interventions:  - Utilize least restrictive behavior  Outcome: Progressing     Problem: Risk for Self Injury/Neglect  Goal: Treatment Goal: Remain safe during length of stay, learn and adopt new coping skills, and be free of self-injurious ideation, impulses and acts at the time of discharge  Outcome: Progressing  Goal: Verbalize thoughts and feelings  Description  Interventions:  - Assess and re-assess patient's lethality and potential for self-injury  - Engage patient in 1:1 interactions, daily, for a minimum of 15 minutes  - Encourage patient to express feelings, fears, frustrations, hopes  - Establish rapport/trust with patient   Outcome: Progressing  Goal: Refrain from harming self  Description  Interventions:  - Monitor patient closely, per order  - Develop a trusting relationship  - Supervise medication ingestion, monitor effects and side effects   Outcome: Progressing  Goal: Attend and participate in unit activities, including therapeutic, recreational, and educational groups  Description  Interventions:  - Provide therapeutic and educational activities daily, encourage attendance and participation, and document same in the medical record  - Obtain collateral information, encourage visitation and family involvement in care   Outcome: Progressing     Problem: Depression  Goal: Treatment Goal: Demonstrate behavioral control of depressive symptoms, verbalize feelings of improved mood/affect, and adopt new coping skills prior to discharge  Outcome: Progressing  Goal: Refrain from isolation  Description  Interventions:  - Develop a trusting relationship   - Encourage socialization   Outcome: Progressing     Problem: Alteration in Orientation  Goal: Interact with staff daily  Description  Interventions:  - Assess and re-assess patient's level of orientation  - Engage patient in 1 on 1 interactions, daily, for a minimum of 15 minutes   - Establish rapport/trust with patient   Outcome: Progressing     Problem: PAIN - ADULT  Goal: Verbalizes/displays adequate comfort level or baseline comfort level  Description  Interventions:  - Encourage patient to monitor pain and request assistance  - Assess pain using appropriate pain scale  - Administer analgesics based on type and severity of pain and evaluate response  - Implement non-pharmacological measures as appropriate and evaluate response  - Consider cultural and social influences on pain and pain management  - Notify physician/advanced practitioner if interventions unsuccessful or patient reports new pain  Outcome: Progressing     Problem: SAFETY ADULT  Goal: Patient will remain free of falls  Description  INTERVENTIONS:  - Assess patient frequently for physical needs  -  Identify cognitive and physical deficits and behaviors that affect risk of falls    -  [place] fall precautions as indicated by assessment   - Educate patient/family on patient safety including physical limitations  - Instruct patient to call for assistance with activity based on assessment  - Modify environment to reduce risk of injury  - Consider OT/PT consult to assist with strengthening/mobility  Outcome: Progressing     Problem: Alteration in Thoughts and Perception  Goal: Treatment Goal: Gain control of psychotic behaviors/thinking, reduce/eliminate presenting symptoms and demonstrate improved reality functioning upon discharge  Outcome: Not Progressing  Goal: Recognize dysfunctional thoughts, communicate reality-based thoughts at the time of discharge  Description  Interventions:  - Provide medication and psycho-education to assist patient in compliance and developing insight into his/her illness   Outcome: Not Progressing     Problem: Nutrition/Hydration-ADULT  Goal: Nutrient/Hydration intake appropriate for improving, restoring or maintaining nutritional needs  Description  Monitor and assess patient's nutrition/hydration status for malnutrition  Collaborate with interdisciplinary team and initiate plan and interventions as ordered  Monitor patient's weight and dietary intake as ordered or per policy  Utilize nutrition screening tool and intervene as necessary  Determine patient's food preferences and provide high-protein, high-caloric foods as appropriate  INTERVENTIONS:  - Monitor oral intake, urinary output, labs, and treatment plans  - Assess nutrition and hydration status and recommend course of action  - Evaluate amount of meals eaten  - Assist patient with eating if necessary   - Allow adequate time for meals  - Recommend/ encourage appropriate diets, oral nutritional supplements, and vitamin/mineral supplements  - Order, calculate, and assess calorie counts as needed  - Recommend, monitor, and adjust tube feedings and TPN/PPN based on assessed needs  - Assess need for intravenous fluids  - Provide specific nutrition/hydration education as appropriate  - Include patient/family/caregiver in decisions related to nutrition  Outcome: Not Progressing  Mostly isolative to her bed, out for meds and meals, attended community meeting and IMR groups  Continues to eat poorly  Ate 25% of breakfast and only the icing off the cake for lunch  No somatic complaints voiced, no issues noted  Continue to monitor

## 2020-02-11 NOTE — ASSESSMENT & PLAN NOTE
Patient was again psychosomatic claiming to have had anxiety attacks even though she did not have any visible symptoms  Her vital signs have been basically stable except for low normal blood pressure but pulse ox has been acceptable  She has not taken a shower since 2 days ago and claims she will take a shower tomorrow and dissipating therapeutic pass with staff on Friday to the community  She is attending groups and has been friendly and pleasant upon approach but continues to harbor some psychosomatic symptoms and paranoia and some covert delusional beliefs  Casually dressed and poorly groomed today  Compliant with medications but still questions some of the staff accusing them of tampering with her oxygen concentrator and inhalant off and on    No longer voicing any overt delusions about having a micro chip under her 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 but continues to have stilted speech as usual   Eating well and sleeping well but is afraid that she might choke and knows that she needs to chew slowly in small pieces   Current medications:    Current Facility-Administered Medications:     acetaminophen (TYLENOL) tablet 325 mg, 325 mg, Oral, Q6H PRN, Prakash Brewster MD    acetaminophen (TYLENOL) tablet 650 mg, 650 mg, Oral, Q6H PRN, Prakash Brewster MD    acetaminophen (TYLENOL) tablet 650 mg, 650 mg, Oral, Q8H PRN, Prakash Brewster MD    albuterol (PROVENTIL HFA,VENTOLIN HFA) inhaler 2 puff, 2 puff, Inhalation, Q4H PRN, Prakash Brewster MD, 2 puff at 10/11/19 0424    aluminum-magnesium hydroxide-simethicone (MYLANTA) 200-200-20 mg/5 mL oral suspension 15 mL, 15 mL, Oral, Q4H PRN, Prakash Brewster MD, 15 mL at 01/26/20 1021    ammonium lactate (LAC-HYDRIN) 12 % lotion 1 application, 1 application, Topical, BID PRN, Prakash Brewster MD    benzonatate (TESSALON PERLES) capsule 100 mg, 100 mg, Oral, TID PRN, Prakash Brewster MD    benztropine (COGENTIN) injection 1 mg, 1 mg, Intramuscular, Q8H PRN, Prakash Brewster MD    carbamide peroxide (DEBROX) 6 5 % otic solution 5 drop, 5 drop, Left Ear, BID PRN, Chanell Walsh MD    cloZAPine (CLOZARIL) tablet 25 mg, 25 mg, Oral, BID, Manuel Copeland MD, 25 mg at 02/10/20 2129    cloZAPine (CLOZARIL) tablet 50 mg, 50 mg, Oral, BID, Manuel Copeland MD, 50 mg at 02/10/20 2130    EPINEPHrine PF (ADRENALIN) 1 mg/mL injection 0 15 mg, 0 15 mg, Intramuscular, Once PRN, Manuel Copeland MD    fluticasone-vilanterol (BREO ELLIPTA) 200-25 MCG/INH inhaler 1 puff, 1 puff, Inhalation, Daily, Manuel Copeland MD, 1 puff at 02/11/20 0815    ketotifen (ZADITOR) 0 025 % ophthalmic solution 1 drop, 1 drop, Right Eye, BID PRN, Manuel Copeland MD    levothyroxine tablet 125 mcg, 125 mcg, Oral, Early Morning, Manuel Copeland MD, 125 mcg at 02/11/20 0601    magnesium hydroxide (MILK OF MAGNESIA) 400 mg/5 mL oral suspension 30 mL, 30 mL, Oral, Daily PRN, Manuel Copeland MD    montelukast (SINGULAIR) tablet 10 mg, 10 mg, Oral, HS, Manuel Copeland MD, 10 mg at 01/16/20 2106    OLANZapine (ZyPREXA) IM injection 5 mg, 5 mg, Intramuscular, Q8H PRN, Manuel Copeland MD    OLANZapine (ZyPREXA) tablet 5 mg, 5 mg, Oral, Q8H PRN, Manuel Copeland MD    ondansetron (ZOFRAN-ODT) dispersible tablet 4 mg, 4 mg, Oral, Q6H PRN, Manuel Copeland MD, 4 mg at 12/09/19 1757    pantoprazole (PROTONIX) EC tablet 40 mg, 40 mg, Oral, Early Morning, Manuel Copeland MD, 40 mg at 02/11/20 0601    polyethylene glycol (MIRALAX) packet 17 g, 17 g, Oral, Daily PRN, Manuel Copeland MD    polyvinyl alcohol (LIQUIFILM TEARS) 1 4 % ophthalmic solution 1 drop, 1 drop, Both Eyes, Q3H PRN, Manuel Copeland MD    sertraline (ZOLOFT) tablet 175 mg, 175 mg, Oral, Daily, Manuel Copeland MD, 175 mg at 02/11/20 0815    sucralfate (CARAFATE) oral suspension 1,000 mg, 1,000 mg, Oral, BID, Cat Torres MD, 1,000 mg at 02/11/20 0602    theophylline (JEF-24) 24 hr capsule 200 mg, 200 mg, Oral, Daily, Manuel Copeland MD, 200 mg at 02/11/20 0815    tiotropium (SPIRIVA) capsule for inhaler 18 mcg, 18 mcg, Inhalation, Daily, Greer Beltran MD, 18 mcg at 02/11/20 0815    traZODone (DESYREL) tablet 25 mg, 25 mg, Oral, HS PRN, Greer Beltran MD  Justification if on more than two antipsychotics: not applicable  Side effects if any: none    Risks , benefits, side effects and precautions of medications discussed with patient and reminded patient to let us know any problems with medications     Objective:     Vital Signs:  Vitals:    02/10/20 1600 02/10/20 1900 02/11/20 0700 02/11/20 0705   BP: 106/66 92/60 95/64 100/61   BP Location: Left arm Left arm Left arm Left arm   Pulse: 88 66 76 73   Resp: 16 15 18 18   Temp: (!) 97 4 °F (36 3 °C) (!) 97 3 °F (36 3 °C) 97 8 °F (36 6 °C)    TempSrc: Temporal Temporal Temporal    SpO2: 94% 93% 91%    Weight:       Height:         Appearance:  age appropriate and older than stated age poorly groomed with uncombed hair but wearing clean clothes   Behavior:  deviant, evasive and guarded tends to become suspicion    Speech:  delayed, increased latency of response and tangential    Mood:  anxious, euphoric and irritable    Affect:  increased in intensity, increased in range, mood-congruent and redirectable   Thought Process:  circumstantial, concrete, goal directed, illogical and perserverative with tendency to become stilted   Thought Content:  delusions  grandiose and persecutory continues to have psychosomatic symptoms off and on and still act is a tree to certain staff about tampering with her oxygen concentrator inhalant, no preoccupation with violence, no obsessions  Perceptual Disturbances: None    Risk Potential: Inability to care for herself and refusal to take showers regularly   Sensorium:  person, place, time/date, situation, day of week, month of year, year and time   Cognition:  grossly intact aware of current well events, no memory deficits in recent or remote domains, no language deficits   Consciousness:  alert and awake    Attention: Fair   Intellect:  At least average Insight:  Improving   Judgment: fair needing frequent reminders to keep up with ADLs and showers twice a week      Motor Activity: no abnormal movements         Recent Labs:  Results Reviewed     None          I/O Past 24 hours:  No intake/output data recorded  No intake/output data recorded  Assessment / Plan:     Schizoaffective disorder, bipolar type ([address] )      Reason for continued inpatient care: to assess ability to maintain improvement by attending to ADLs and taking showers regular and see how she does on outing with family after another outing with staff escort next week  Acceptance by patient: accepting now  Hopefulness in recovery: living at the [facility] soon  Understanding of medications :  yes  Involved in reintegration process: attending groups and has off grounds and off unit privileges   Trusting in relatoinship with psychiatrist:  Sheri Randall now    Recommended Treatment:    Medication changes:  1) none today    Non-pharmacological treatments  1) Continue with individual therapy, group therapy, milieu therapy and occupational therapy using recovery principles and psycho-education about accepting illness and need for treatment   2) encourage to take showers twice a week and cooperate with treatment and adl skills as per her behav  plan  3) reschedule another pass with staff to community this week on Friday before pass with sister to see if she would fully comply with assigned outing possibly later this week and not today  Safety  1) Safety/communication plan established targeting dynamic risk factors above  Discharge Plan to a [facility] at [address] / Coordination of Care    Total floor / unit time spent today 15 minutes  Greater than 50% of total time was spent with the patient and / or family counseling and / or coordination of care  Receptive to listening and learning coping skills for management of symptoms and attending to ADLs       Patient's Rights, confidentiality and exceptions to confidentiality, use of automated medical record, [address] staff access to medical record, and consent to treatment reviewed      Christian Bennett MD

## 2020-02-11 NOTE — PROGRESS NOTES
2145 Maggie did not attend PM Group, refused Incentive Spirometer this evening  "I'm tired!" She did have an HS snack of Oreos & milk, came up for HS medicine except for the Singulair  Wearing now her QHS humidified nasal O2 @ 1L for bed  Does worry that someone tampers w/the humidification bottle because it is turned when she awakens  Presented to her that staff may turn it slightly to get a better look @ whether it is bubbling   "Oh, that must be it "

## 2020-02-11 NOTE — TREATMENT TEAM
[facility] NOTE  Full, active participation        02/11/20 1100   Activity/Group Checklist   Group Other (Comment)   Attendance Attended   Attendance Duration (min) 31-45   Interactions Interacted appropriately   Affect/Mood Appropriate

## 2020-02-11 NOTE — ASSESSMENT & PLAN NOTE
Patient did take a shower yesterday in anticipation of going out today to the [facility] has agreed upon rather than to the place of her own choice once we she leaves the unit  She is also anticipating to go on another therapeutic pass with her sister next week if all goes well today  She again verbalized some fear that staff was tampering with her humidifier but did accept the fact that maybe the staff is turning it to 1 side so they can look at the reading which was satisfactory to her  She is not eating all the time and does attend most of groups and remains well groomed well kept but continues to appear to harbor some underlying delusions as always  No overt psychotic symptoms elicited, no current suicidal homicidal thoughts intent or plans reported  No side effects noted  She has tolerated the increase in Zoloft without any problem so far  No longer voicing any believes about her having a micro chip under the lipoma on her hand  Compliant with clozapine which she has tolerated well so far  Still with some occasional psychosomatic symptoms but redirectable     Current medications:    Current Facility-Administered Medications:     acetaminophen (TYLENOL) tablet 325 mg, 325 mg, Oral, Q6H PRN, Jill Gayle MD    acetaminophen (TYLENOL) tablet 650 mg, 650 mg, Oral, Q6H PRN, Jill Gayle MD    acetaminophen (TYLENOL) tablet 650 mg, 650 mg, Oral, Q8H PRN, Jill Gayle MD    albuterol (PROVENTIL HFA,VENTOLIN HFA) inhaler 2 puff, 2 puff, Inhalation, Q4H PRN, Jill Gayle MD, 2 puff at 10/11/19 0424    aluminum-magnesium hydroxide-simethicone (MYLANTA) 200-200-20 mg/5 mL oral suspension 15 mL, 15 mL, Oral, Q4H PRN, Jill Gayle MD, 15 mL at 01/26/20 1021    ammonium lactate (LAC-HYDRIN) 12 % lotion 1 application, 1 application, Topical, BID PRN, Jill Gayle MD    benzonatate (TESSALON PERLES) capsule 100 mg, 100 mg, Oral, TID PRN, Jill Gayle MD    benztropine (COGENTIN) injection 1 mg, 1 mg, Intramuscular, Q8H PRN, Deedee Muir MD    carbamide peroxide (DEBROX) 6 5 % otic solution 5 drop, 5 drop, Left Ear, BID PRN, Ike Boston MD    cloZAPine (CLOZARIL) tablet 25 mg, 25 mg, Oral, BID, Deedee Muir MD, 25 mg at 02/10/20 2129    cloZAPine (CLOZARIL) tablet 50 mg, 50 mg, Oral, BID, Deedee Muir MD, 50 mg at 02/10/20 2130    EPINEPHrine PF (ADRENALIN) 1 mg/mL injection 0 15 mg, 0 15 mg, Intramuscular, Once PRN, Deedee Muir MD    fluticasone-vilanterol (BREO ELLIPTA) 200-25 MCG/INH inhaler 1 puff, 1 puff, Inhalation, Daily, Deedee Muir MD, 1 puff at 02/10/20 0837    ketotifen (ZADITOR) 0 025 % ophthalmic solution 1 drop, 1 drop, Right Eye, BID PRN, Deedee Muir MD    levothyroxine tablet 125 mcg, 125 mcg, Oral, Early Morning, Deedee Muir MD, 125 mcg at 02/11/20 0601    magnesium hydroxide (MILK OF MAGNESIA) 400 mg/5 mL oral suspension 30 mL, 30 mL, Oral, Daily PRN, Deedee Muir MD    montelukast (SINGULAIR) tablet 10 mg, 10 mg, Oral, HS, Deedee Muir MD, 10 mg at 01/16/20 2106    OLANZapine (ZyPREXA) IM injection 5 mg, 5 mg, Intramuscular, Q8H PRN, Deedee Muir MD    OLANZapine (ZyPREXA) tablet 5 mg, 5 mg, Oral, Q8H PRN, Deedee Muir MD    ondansetron (ZOFRAN-ODT) dispersible tablet 4 mg, 4 mg, Oral, Q6H PRN, Deedee Muir MD, 4 mg at 12/09/19 1757    pantoprazole (PROTONIX) EC tablet 40 mg, 40 mg, Oral, Early Morning, Deedee Muir MD, 40 mg at 02/11/20 0601    polyethylene glycol (MIRALAX) packet 17 g, 17 g, Oral, Daily PRN, Deedee Muir MD    polyvinyl alcohol (LIQUIFILM TEARS) 1 4 % ophthalmic solution 1 drop, 1 drop, Both Eyes, Q3H PRN, Deedee Muir MD    sertraline (ZOLOFT) tablet 175 mg, 175 mg, Oral, Daily, Deedee Muir MD    sucralfate (CARAFATE) oral suspension 1,000 mg, 1,000 mg, Oral, BID, Cat Torres MD, 1,000 mg at 02/11/20 0602    theophylline (JEF-24) 24 hr capsule 200 mg, 200 mg, Oral, Daily, Deedee Muir MD, 200 mg at 02/10/20 0838    tiotropium (SPIRIVA) capsule for inhaler 18 mcg, 18 mcg, Inhalation, Daily, Isidoro Gonzalez MD, 18 mcg at 02/10/20 7970    traZODone (DESYREL) tablet 25 mg, 25 mg, Oral, HS PRN, Isidoro Gonzalez MD  Justification if on more than two antipsychotics: not applicable  Side effects if any: none    Risks , benefits, side effects and precautions of medications discussed with patient and reminded patient to let us know any problems with medications     Objective:     Vital Signs:  Vitals:    02/10/20 0700 02/10/20 0705 02/10/20 1600 02/10/20 1900   BP: 110/63 106/67 106/66 92/60   BP Location: Left arm Left arm Left arm Left arm   Pulse: 82 80 88 66   Resp: 18 18 16 15   Temp: 97 6 °F (36 4 °C)  (!) 97 4 °F (36 3 °C) (!) 97 3 °F (36 3 °C)   TempSrc: Temporal  Temporal Temporal   SpO2:  92% 94% 93%   Weight:       Height:         Appearance:  age appropriate and older than stated age better groomed with combed hair and well dressed   Behavior:  deviant, evasive and guarded less suspicious    Speech:  delayed, increased latency of response and tangential    Mood:  anxious, euphoric and irritable    Affect:  increased in intensity, increased in range, mood-congruent and redirectable   Thought Process:  circumstantial, concrete, goal directed, illogical and perserverative with stilted speech    Thought Content:  delusions  grandiose and persecutory  Still with some suspicions about certain staff off and tampering with her oxygen concentrator, no current s/h thoughts intent or plans, no obsessions, still with some psychosomatic sxs at come and go    Perceptual Disturbances: None    Risk Potential: Inability to care for herself and refusal to take showers regularly   Sensorium:  person, place, time/date, situation, day of week, month of year, year and time   Cognition:  grossly intact with no language deficits, aware of current events, recent and remote memory intact   Consciousness:  alert and awake    Attention: fair   Intellect:  At least average   Insight: improving   Judgment: fair but needs reminders for regular showers      Motor Activity: no abnormal movements         Recent Labs:  Results Reviewed     None          I/O Past 24 hours:  No intake/output data recorded  No intake/output data recorded  Assessment / Plan:     Schizoaffective disorder, bipolar type ([address] )      Reason for continued inpatient care: to assess ability to maintain improvement by attending to ADLs and taking showers regular and see how she does on outing with family after another outing with staff escort next week  Acceptance by patient: accepting now  Hopefulness in recovery: living at the [facility] soon  Understanding of medications :  yes  Involved in reintegration process: attending groups and has off grounds and off unit privileges   Trusting in relatoinship with psychiatrist:  Subha Soto now    Recommended Treatment:    Medication changes:  1) none today    Non-pharmacological treatments  1) Continue with individual therapy, group therapy, milieu therapy and occupational therapy using recovery principles and psycho-education about accepting illness and need for treatment   2) encourage to take showers twice a week and cooperate with treatment and adl skills as per her behav  plan  3) reschedule another pass with staff to community next week on Wednesday before pass with sister to see if she would fully comply with assigned outing possibly later this week and not today  Safety  1) Safety/communication plan established targeting dynamic risk factors above  Discharge Plan to a [facility] at [address] / Coordination of Care    Total floor / unit time spent today 15 minutes  Greater than 50% of total time was spent with the patient and / or family counseling and / or coordination of care  Receptive to listening and learning coping skills for management of symptoms and attending to ADLs       Patient's Rights, confidentiality and exceptions to confidentiality, use of automated medical record, [address] [place] [address] staff access to medical record, and consent to treatment reviewed      Jeffrey Gallegos MD
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
## 2020-02-11 NOTE — PLAN OF CARE
Problem: Alteration in Thoughts and Perception  Goal: Agree to be compliant with medication regime, as prescribed and report medication side effects  Description  Interventions:  - Offer appropriate PRN medication and supervise ingestion; conduct aims, as needed   Outcome: Progressing  Goal: Complete daily ADLs, including personal hygiene independently, as able  Description  Interventions:  - Observe, teach, and assist patient with ADLS  - Monitor and promote a balance of rest/activity, with adequate nutrition and elimination   Outcome: Progressing     Problem: Nutrition/Hydration-ADULT  Goal: Nutrient/Hydration intake appropriate for improving, restoring or maintaining nutritional needs  Description  Monitor and assess patient's nutrition/hydration status for malnutrition  Collaborate with interdisciplinary team and initiate plan and interventions as ordered  Monitor patient's weight and dietary intake as ordered or per policy  Utilize nutrition screening tool and intervene as necessary  Determine patient's food preferences and provide high-protein, high-caloric foods as appropriate       INTERVENTIONS:  - Monitor oral intake, urinary output, labs, and treatment plans  - Assess nutrition and hydration status and recommend course of action  - Evaluate amount of meals eaten  - Assist patient with eating if necessary   - Allow adequate time for meals  - Recommend/ encourage appropriate diets, oral nutritional supplements, and vitamin/mineral supplements  - Order, calculate, and assess calorie counts as needed  - Recommend, monitor, and adjust tube feedings and TPN/PPN based on assessed needs  - Assess need for intravenous fluids  - Provide specific nutrition/hydration education as appropriate  - Include patient/family/caregiver in decisions related to nutrition  Outcome: Progressing     Problem: Depression  Goal: Refrain from isolation  Description  Interventions:  - Develop a trusting relationship   - Encourage socialization   Outcome: Not Kiki Echeverria was willing to shower & groom w/assistance for setup & w/hair care  "I have to shower to go on the outing tomorrow & I have to listen " This latter in reference to going wherever staff has planned, not offering objections or trying to manipulate  She is not entirely happy about the outing, but, willing to comply so can progress within the program  She ate 75% of her meal, 3/4 egg salad, coffee, & Ensure  But, she continues to minimize her intake & voices continued worries about choking  She came up for supper medicine  In free time she is isolative to room & bed

## 2020-02-11 NOTE — PLAN OF CARE
Problem: Alteration in Thoughts and Perception  Goal: Agree to be compliant with medication regime, as prescribed and report medication side effects  Description  Interventions:  - Offer appropriate PRN medication and supervise ingestion; conduct aims, as needed   Outcome: Progressing  Goal: Attend and participate in unit activities, including therapeutic, recreational, and educational groups  Description  Interventions:  - Provide therapeutic and educational activities daily, encourage attendance and participation, and document same in the medical record     CERTIFIED PEER SPECIALIST INTERVENTIONS:    Complete peer assessment with patient to assess their needs and identify their goals to complete while in the recovery program as well as once discharged into the community  Patient will complete WRAP Plan, Crisis Plan and [facility]  Patient will attend 50% of groups offered on the unit  Patient will complete a goal card weekly  Outcome: Progressing     Problem: Alteration in Orientation  Goal: Interact with staff daily  Description  Interventions:  - Assess and re-assess patient's level of orientation  - Engage patient in 1 on 1 interactions, daily, for a minimum of 15 minutes   - Establish rapport/trust with patient   Outcome: Pool Simon has been intermittently visible in between scheduled meals and attending select groups  Was compliant with her medications, no somatic complaints voiced this shift or signs of discomfort observed or verbalized  Behaviors controlled an appropriate  Patient outing with staff postponed temporarily and Chandu Flaherty was accepting of this  Will continue to monitor

## 2020-02-11 NOTE — PROGRESS NOTES
Sleeping at this time, no distress noted  O2 on at 1L via NC   Safe and fall precautions maintained and monitoring continues

## 2020-02-11 NOTE — PROGRESS NOTES
Progress Note - Pushpa Morgan 1955, 60 y o  female MRN: [id number]    [facility] Encounter: [id number]    Primary Care Provider: Amy Cameron PA-C   Date and time admitted to hospital: 7/23/2019  5:30 PM        * Schizoaffective disorder, bipolar type [facility])  Assessment & Plan  Patient did take a shower yesterday in anticipation of going out today to the [place] has agreed upon rather than to the place of her own choice once we she leaves the unit  She is also anticipating to go on another therapeutic pass with her sister next week if all goes well today  She again verbalized some fear that staff was tampering with her humidifier but did accept the fact that maybe the staff is turning it to 1 side so they can look at the reading which was satisfactory to her  She is not eating all the time and does attend most of groups and remains well groomed well kept but continues to appear to harbor some underlying delusions as always  No overt psychotic symptoms elicited, no current suicidal homicidal thoughts intent or plans reported  No side effects noted  She has tolerated the increase in Zoloft without any problem so far  No longer voicing any believes about her having a micro chip under the lipoma on her hand  Compliant with clozapine which she has tolerated well so far  Still with some occasional psychosomatic symptoms but redirectable     Current medications:    Current Facility-Administered Medications:     acetaminophen (TYLENOL) tablet 325 mg, 325 mg, Oral, Q6H PRN, Carissa Willis MD    acetaminophen (TYLENOL) tablet 650 mg, 650 mg, Oral, Q6H PRN, Carissa Willis MD    acetaminophen (TYLENOL) tablet 650 mg, 650 mg, Oral, Q8H PRN, Carissa Willis MD    albuterol (PROVENTIL HFA,VENTOLIN HFA) inhaler 2 puff, 2 puff, Inhalation, Q4H PRN, Carissa Willis MD, 2 puff at 10/11/19 0424    aluminum-magnesium hydroxide-simethicone (MYLANTA) 200-200-20 mg/5 mL oral suspension 15 mL, 15 mL, Oral, Q4H PRN, Chichi Lockett MD, 15 mL at 01/26/20 1021    ammonium lactate (LAC-HYDRIN) 12 % lotion 1 application, 1 application, Topical, BID PRN, Chichi Lockett MD    benzonatate (TESSALON PERLES) capsule 100 mg, 100 mg, Oral, TID PRN, Chichi Lockett MD    benztropine (COGENTIN) injection 1 mg, 1 mg, Intramuscular, Q8H PRN, Chichi Lockett MD    carbamide peroxide (DEBROX) 6 5 % otic solution 5 drop, 5 drop, Left Ear, BID PRN, Lara Draper MD    cloZAPine (CLOZARIL) tablet 25 mg, 25 mg, Oral, BID, Chichi Lockett MD, 25 mg at 02/10/20 2129    cloZAPine (CLOZARIL) tablet 50 mg, 50 mg, Oral, BID, Chichi Lockett MD, 50 mg at 02/10/20 2130    EPINEPHrine PF (ADRENALIN) 1 mg/mL injection 0 15 mg, 0 15 mg, Intramuscular, Once PRN, Chichi Lockett MD    fluticasone-vilanterol (BREO ELLIPTA) 200-25 MCG/INH inhaler 1 puff, 1 puff, Inhalation, Daily, Chichi Lockett MD, 1 puff at 02/10/20 0837    ketotifen (ZADITOR) 0 025 % ophthalmic solution 1 drop, 1 drop, Right Eye, BID PRN, Chichi Lockett MD    levothyroxine tablet 125 mcg, 125 mcg, Oral, Early Morning, Chichi Lockett MD, 125 mcg at 02/11/20 0601    magnesium hydroxide (MILK OF MAGNESIA) 400 mg/5 mL oral suspension 30 mL, 30 mL, Oral, Daily PRN, Chichi Lockett MD    montelukast (SINGULAIR) tablet 10 mg, 10 mg, Oral, HS, Chichi Lockett MD, 10 mg at 01/16/20 2106    OLANZapine (ZyPREXA) IM injection 5 mg, 5 mg, Intramuscular, Q8H PRN, Chichi Lockett MD    OLANZapine (ZyPREXA) tablet 5 mg, 5 mg, Oral, Q8H PRN, Chichi Lockett MD    ondansetron (ZOFRAN-ODT) dispersible tablet 4 mg, 4 mg, Oral, Q6H PRN, Chichi Lockett MD, 4 mg at 12/09/19 1757    pantoprazole (PROTONIX) EC tablet 40 mg, 40 mg, Oral, Early Morning, Chichi Lockett MD, 40 mg at 02/11/20 0601    polyethylene glycol (MIRALAX) packet 17 g, 17 g, Oral, Daily PRN, Chichi Lockett MD    polyvinyl alcohol (LIQUIFILM TEARS) 1 4 % ophthalmic solution 1 drop, 1 drop, Both Eyes, Q3H PRN, Chichi Lockett MD    sertraline (ZOLOFT) tablet 175 mg, 175 mg, Oral, Daily, Jennifer Taveras MD    sucralfate (CARAFATE) oral suspension 1,000 mg, 1,000 mg, Oral, BID, Fletcher Nevarez MD, 1,000 mg at 02/11/20 0602    theophylline (JEF-24) 24 hr capsule 200 mg, 200 mg, Oral, Daily, Jennifer Taveras MD, 200 mg at 02/10/20 1384    tiotropium [facility]) capsule for inhaler 18 mcg, 18 mcg, Inhalation, Daily, Jennifer Taveras MD, 18 mcg at 02/10/20 9552    traZODone (DESYREL) tablet 25 mg, 25 mg, Oral, HS PRN, Jennifer Taveras MD  Justification if on more than two antipsychotics: not applicable  Side effects if any: none    Risks , benefits, side effects and precautions of medications discussed with patient and reminded patient to let us know any problems with medications     Objective:     Vital Signs:  Vitals:    02/10/20 0700 02/10/20 0705 02/10/20 1600 02/10/20 1900   BP: 110/63 106/67 106/66 92/60   BP Location: Left arm Left arm Left arm Left arm   Pulse: 82 80 88 66   Resp: 18 18 16 15   Temp: 97 6 °F (36 4 °C)  (!) 97 4 °F (36 3 °C) (!) 97 3 °F (36 3 °C)   TempSrc: Temporal  Temporal Temporal   SpO2:  92% 94% 93%   Weight:       Height:         Appearance:  age appropriate and older than stated age better groomed with combed hair and well dressed   Behavior:  deviant, evasive and guarded less suspicious    Speech:  delayed, increased latency of response and tangential    Mood:  anxious, euphoric and irritable    Affect:  increased in intensity, increased in range, mood-congruent and redirectable   Thought Process:  circumstantial, concrete, goal directed, illogical and perserverative with stilted speech    Thought Content:  delusions  grandiose and persecutory  Still with some suspicions about certain staff off and tampering with her oxygen concentrator, no current s/h thoughts intent or plans, no obsessions, still with some psychosomatic sxs at come and go    Perceptual Disturbances: None    Risk Potential: Inability to care for herself and refusal to take showers regularly Sensorium:  person, place, time/date, situation, day of week, month of year, year and time   Cognition:  grossly intact with no language deficits, aware of current events, recent and remote memory intact   Consciousness:  alert and awake    Attention: fair   Intellect: At least average   Insight:  improving   Judgment: fair but needs reminders for regular showers      Motor Activity: no abnormal movements         Recent Labs:  Results Reviewed     None          I/O Past 24 hours:  No intake/output data recorded  No intake/output data recorded  Assessment / Plan:     Schizoaffective disorder, bipolar type ([facility] [address] )      Reason for continued inpatient care: to assess ability to maintain improvement by attending to ADLs and taking showers regular and see how she does on outing with family after another outing with staff escort next week  Acceptance by patient: accepting now  Hopefulness in recovery: living at the [facility]  Understanding of medications :  yes  Involved in reintegration process: attending groups and has off grounds and off unit privileges   Trusting in relatoinship with psychiatrist:  Antoine Ibarra now    Recommended Treatment:    Medication changes:  1) none today    Non-pharmacological treatments  1) Continue with individual therapy, group therapy, milieu therapy and occupational therapy using recovery principles and psycho-education about accepting illness and need for treatment   2) encourage to take showers twice a week and cooperate with treatment and adl skills as per her behav  plan  3) reschedule another pass with staff to community next week on Wednesday before pass with sister to see if she would fully comply with assigned outing possibly later this week and not today  Safety  1) Safety/communication plan established targeting dynamic risk factors above  Discharge Plan to a [facility] at [phone]  / Coordination of Care    Total floor / unit time spent today 15 minutes   Greater than 50% of total time was spent with the patient and / or family counseling and / or coordination of care  Receptive to listening and learning coping skills for management of symptoms and attending to ADLs  Patient's Rights, confidentiality and exceptions to confidentiality, use of automated medical record, Jeb Patrick staff access to medical record, and consent to treatment reviewed      Jaz Beasley MD

## 2020-02-12 NOTE — PROGRESS NOTES
Progress Note - Annamarie Eng 1955, 60 y o  female MRN: [id number]    [facility] Encounter: [id number]    Primary Care Provider: Naila Khalil PA-C   Date and time admitted to hospital: 7/23/2019  5:30 PM        * Schizoaffective disorder, bipolar type [facility])  Assessment & Plan  Patient was again psychosomatic claiming to have had anxiety attacks even though she did not have any visible symptoms  Her vital signs have been basically stable except for low normal blood pressure but pulse ox has been acceptable  She has not taken a shower since 2 days ago and claims she will take a shower tomorrow and dissipating therapeutic pass with staff on Friday to the community  She is attending groups and has been friendly and pleasant upon approach but continues to harbor some psychosomatic symptoms and paranoia and some covert delusional beliefs  Casually dressed and poorly groomed today  Compliant with medications but still questions some of the staff accusing them of tampering with her oxygen concentrator and inhalant off and on    No longer voicing any overt delusions about having a micro chip under her 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 but continues to have stilted speech as usual   Eating well and sleeping well but is afraid that she might choke and knows that she needs to chew slowly in small pieces   Current medications:    Current Facility-Administered Medications:     acetaminophen (TYLENOL) tablet 325 mg, 325 mg, Oral, Q6H PRN, Shi Pham MD    acetaminophen (TYLENOL) tablet 650 mg, 650 mg, Oral, Q6H PRN, Shi Pham MD    acetaminophen (TYLENOL) tablet 650 mg, 650 mg, Oral, Q8H PRN, Shi Pham MD    albuterol (PROVENTIL HFA,VENTOLIN HFA) inhaler 2 puff, 2 puff, Inhalation, Q4H PRN, Shi Pham MD, 2 puff at 10/11/19 0424    aluminum-magnesium hydroxide-simethicone (MYLANTA) 200-200-20 mg/5 mL oral suspension 15 mL, 15 mL, Oral, Q4H PRN, Shi Pham MD, 15 mL at 01/26/20 1021    ammonium lactate (LAC-HYDRIN) 12 % lotion 1 application, 1 application, Topical, BID PRN, Faheem Daniels MD    benzonatate (TESSALON PERLES) capsule 100 mg, 100 mg, Oral, TID PRN, Faheem Daniels MD    benztropine (COGENTIN) injection 1 mg, 1 mg, Intramuscular, Q8H PRN, Faheem Daniels MD    carbamide peroxide (DEBROX) 6 5 % otic solution 5 drop, 5 drop, Left Ear, BID PRN, Anna Arana MD    cloZAPine (CLOZARIL) tablet 25 mg, 25 mg, Oral, BID, Faheem Daniels MD, 25 mg at 02/10/20 2129    cloZAPine (CLOZARIL) tablet 50 mg, 50 mg, Oral, BID, Faheem Daniels MD, 50 mg at 02/10/20 2130    EPINEPHrine PF (ADRENALIN) 1 mg/mL injection 0 15 mg, 0 15 mg, Intramuscular, Once PRN, Faheem Daniels MD    fluticasone-vilanterol (BREO ELLIPTA) 200-25 MCG/INH inhaler 1 puff, 1 puff, Inhalation, Daily, Faheem Daniels MD, 1 puff at 02/11/20 0815    ketotifen (ZADITOR) 0 025 % ophthalmic solution 1 drop, 1 drop, Right Eye, BID PRN, Faheem Daniels MD    levothyroxine tablet 125 mcg, 125 mcg, Oral, Early Morning, Faheem Daniels MD, 125 mcg at 02/11/20 0601    magnesium hydroxide (MILK OF MAGNESIA) 400 mg/5 mL oral suspension 30 mL, 30 mL, Oral, Daily PRN, Faheem Daniels MD    montelukast (SINGULAIR) tablet 10 mg, 10 mg, Oral, HS, Faheem Daniels MD, 10 mg at 01/16/20 2106    OLANZapine (ZyPREXA) IM injection 5 mg, 5 mg, Intramuscular, Q8H PRN, Faheem Daniels MD    OLANZapine (ZyPREXA) tablet 5 mg, 5 mg, Oral, Q8H PRN, Faheem Daniels MD    ondansetron (ZOFRAN-ODT) dispersible tablet 4 mg, 4 mg, Oral, Q6H PRN, Faheem Daniels MD, 4 mg at 12/09/19 1757    pantoprazole (PROTONIX) EC tablet 40 mg, 40 mg, Oral, Early Morning, Faheem Daniels MD, 40 mg at 02/11/20 0601    polyethylene glycol (MIRALAX) packet 17 g, 17 g, Oral, Daily PRN, Faheem Daniels MD    polyvinyl alcohol (LIQUIFILM TEARS) 1 4 % ophthalmic solution 1 drop, 1 drop, Both Eyes, Q3H PRN, Faheem Daniels MD    sertraline (ZOLOFT) tablet 175 mg, 175 mg, Oral, Daily, Faheem Daniels MD, 175 mg at 02/11/20 0815   sucralfate (CARAFATE) oral suspension 1,000 mg, 1,000 mg, Oral, BID, Cat Torres MD, 1,000 mg at 02/11/20 0602    theophylline (JEF-24) 24 hr capsule 200 mg, 200 mg, Oral, Daily, Shaggy Rangel MD, 200 mg at 02/11/20 0815    tiotropium (SPIRIVA) capsule for inhaler 18 mcg, 18 mcg, Inhalation, Daily, Shaggy Rangel MD, 18 mcg at 02/11/20 0815    traZODone (DESYREL) tablet 25 mg, 25 mg, Oral, HS PRN, Shagyg Rangel MD  Justification if on more than two antipsychotics: not applicable  Side effects if any: none    Risks , benefits, side effects and precautions of medications discussed with patient and reminded patient to let us know any problems with medications     Objective:     Vital Signs:  Vitals:    02/10/20 1600 02/10/20 1900 02/11/20 0700 02/11/20 0705   BP: 106/66 92/60 95/64 100/61   BP Location: Left arm Left arm Left arm Left arm   Pulse: 88 66 76 73   Resp: 16 15 18 18   Temp: (!) 97 4 °F (36 3 °C) (!) 97 3 °F (36 3 °C) 97 8 °F (36 6 °C)    TempSrc: Temporal Temporal Temporal    SpO2: 94% 93% 91%    Weight:       Height:         Appearance:  age appropriate and older than stated age poorly groomed with uncombed hair but wearing clean clothes   Behavior:  deviant, evasive and guarded tends to become suspicion    Speech:  delayed, increased latency of response and tangential    Mood:  anxious, euphoric and irritable    Affect:  increased in intensity, increased in range, mood-congruent and redirectable   Thought Process:  circumstantial, concrete, goal directed, illogical and perserverative with tendency to become stilted   Thought Content:  delusions  grandiose and persecutory continues to have psychosomatic symptoms off and on and still act is a tree to certain staff about tampering with her oxygen concentrator inhalant, no preoccupation with violence, no obsessions       Perceptual Disturbances: None    Risk Potential: Inability to care for herself and refusal to take showers regularly   Sensorium: person, place, time/date, situation, day of week, month of year, year and time   Cognition:  grossly intact aware of current well events, no memory deficits in recent or remote domains, no language deficits   Consciousness:  alert and awake    Attention: Fair   Intellect: At least average   Insight:  Improving   Judgment: fair needing frequent reminders to keep up with ADLs and showers twice a week      Motor Activity: no abnormal movements         Recent Labs:  Results Reviewed     None          I/O Past 24 hours:  No intake/output data recorded  No intake/output data recorded  Assessment / Plan:     Schizoaffective disorder, bipolar type ([facility] [address] )      Reason for continued inpatient care: to assess ability to maintain improvement by attending to ADLs and taking showers regular and see how she does on outing with family after another outing with staff escort next week  Acceptance by patient: accepting now  Hopefulness in recovery: living at the [facility] soon  Understanding of medications :  yes  Involved in reintegration process: attending groups and has off grounds and off unit privileges   Trusting in relatoinship with psychiatrist:  Millicent Alexander now    Recommended Treatment:    Medication changes:  1) none today    Non-pharmacological treatments  1) Continue with individual therapy, group therapy, milieu therapy and occupational therapy using recovery principles and psycho-education about accepting illness and need for treatment   2) encourage to take showers twice a week and cooperate with treatment and adl skills as per her behav  plan  3) reschedule another pass with staff to community this week on Friday before pass with sister to see if she would fully comply with assigned outing possibly later this week and not today  Safety  1) Safety/communication plan established targeting dynamic risk factors above    Discharge Plan to a [facility] [address] [facility]  / Coordination of Care    Total floor / unit time spent today 15 minutes  Greater than 50% of total time was spent with the patient and / or family counseling and / or coordination of care  Receptive to listening and learning coping skills for management of symptoms and attending to ADLs  Patient's Rights, confidentiality and exceptions to confidentiality, use of automated medical record, [facility] Patrick staff access to medical record, and consent to treatment reviewed      Meredith Wesley MD

## 2020-02-12 NOTE — PROGRESS NOTES
[address] did not attend PM Group, was asleep  This nurse prompted her awake to do Incentive Spirometry & she achieved volumes of 1100-1250ml  She came out to have an HS snack, came to window for HS medicine except the Singulair  Wearing now her QHS humidified nasal O2 @ 1L for bed

## 2020-02-12 NOTE — PLAN OF CARE
Problem: PAIN - ADULT  Goal: Verbalizes/displays adequate comfort level or baseline comfort level  Description  Interventions:  - Encourage patient to monitor pain and request assistance  - Assess pain using appropriate pain scale  - Administer analgesics based on type and severity of pain and evaluate response  - Implement non-pharmacological measures as appropriate and evaluate response  - Consider cultural and social influences on pain and pain management  - Notify physician/advanced practitioner if interventions unsuccessful or patient reports new pain  Outcome: Progressing     Problem: SAFETY ADULT  Goal: Patient will remain free of falls  Description  INTERVENTIONS:  - Assess patient frequently for physical needs  -  Identify cognitive and physical deficits and behaviors that affect risk of falls    -  [place] fall precautions as indicated by assessment   - Educate patient/family on patient safety including physical limitations  - Instruct patient to call for assistance with activity based on assessment  - Modify environment to reduce risk of injury  - Consider OT/PT consult to assist with strengthening/mobility  Outcome: Progressing     Problem: SLEEP DISTURBANCE  Goal: Will exhibit normal sleeping pattern  Description  Interventions:  -  Assess the patients sleep pattern, noting recent changes  - Administer medication as ordered  - Decrease environmental stimuli, including noise, as appropriate during the night  - Encourage the patient to actively participate in unit groups and or exercise during the day to enhance ability to achieve adequate sleep at night  - Assess the patient, in the morning, encouraging a description of sleep experience  Outcome: Progressing    Maggie maintained on ongoing fall and SAFE precaution  Bill Milks in bed with eyes closed, breath even and unlabored   On O2 with humidifier @1L/m via nasal cannula   Q 15 minutes rounding   No somatic complaint overnight  No PRN needed for sleep aid   No indication of pain or discomfort   No respiratory distress   Will continue to monitor

## 2020-02-12 NOTE — PROGRESS NOTES
02/11/20 1500   Activity/Group Checklist   Group Other (Comment)  (Recovery Workshop)   Attendance Attended   Attendance Duration (min) 31-45   Interactions Did not interact   Affect/Mood Appropriate   Goals Achieved Able to self-disclose; Able to recieve feedback; Able to listen to others; Able to engage in interactions     Patient attended Recovery Workshop  Patient was attentive and engaged  She was supportive and encouraging to new peers completing the self-assessment for the first time

## 2020-02-12 NOTE — PROGRESS NOTES
02/12/20 0800   Team Meeting   Meeting Type Daily Rounds   Team Members Present   Team Members Present Physician;Nurse; Other (Discipline and Name)   Physician Team Member Dr Shanna Carballo Team Member Case Gutierrez, LISA   Other (Discipline and Name) Josse Mcfarland LCSW     Patient is vigilant about choking, makes food selections based on possibility of choking  Otherwise no issues

## 2020-02-12 NOTE — PLAN OF CARE
Problem: Alteration in Thoughts and Perception  Goal: Agree to be compliant with medication regime, as prescribed and report medication side effects  Description  Interventions:  - Offer appropriate PRN medication and supervise ingestion; conduct aims, as needed   Outcome: Progressing     Problem: Ineffective Coping  Goal: Patient/Family verbalizes awareness of resources  Outcome: Progressing  Goal: Understands least restrictive measures  Description  Interventions:  - Utilize least restrictive behavior  Outcome: Progressing     Problem: Risk for Self Injury/Neglect  Goal: Treatment Goal: Remain safe during length of stay, learn and adopt new coping skills, and be free of self-injurious ideation, impulses and acts at the time of discharge  Outcome: Progressing  Goal: Refrain from harming self  Description  Interventions:  - Monitor patient closely, per order  - Develop a trusting relationship  - Supervise medication ingestion, monitor effects and side effects   Outcome: Progressing     Problem: PAIN - ADULT  Goal: Verbalizes/displays adequate comfort level or baseline comfort level  Description  Interventions:  - Encourage patient to monitor pain and request assistance  - Assess pain using appropriate pain scale  - Administer analgesics based on type and severity of pain and evaluate response  - Implement non-pharmacological measures as appropriate and evaluate response  - Consider cultural and social influences on pain and pain management  - Notify physician/advanced practitioner if interventions unsuccessful or patient reports new pain  Outcome: Progressing     Problem: SAFETY ADULT  Goal: Patient will remain free of falls  Description  INTERVENTIONS:  - Assess patient frequently for physical needs  -  Identify cognitive and physical deficits and behaviors that affect risk of falls    -  [place] fall precautions as indicated by assessment   - Educate patient/family on patient safety including physical limitations  - Instruct patient to call for assistance with activity based on assessment  - Modify environment to reduce risk of injury  - Consider OT/PT consult to assist with strengthening/mobility  Outcome: Progressing     Problem: Alteration in Thoughts and Perception  Goal: Treatment Goal: Gain control of psychotic behaviors/thinking, reduce/eliminate presenting symptoms and demonstrate improved reality functioning upon discharge  Outcome: Not Progressing  Goal: Verbalize thoughts and feelings  Description  Interventions:  - Promote a nonjudgmental and trusting relationship with the patient through active listening and therapeutic communication  - Assess patient's level of functioning, behavior and potential for risk  - Engage patient in 1 on 1 interactions for a minimum of 15 minutes each session  - Encourage patient to express fears, feelings, frustrations, and discuss symptoms    - [place] patient to reality, help patient recognize reality-based thinking   - Administer medications as ordered and assess for potential side effects  - Provide the patient education related to the signs and symptoms of the illness and desired effects of prescribed medications  Outcome: Not Progressing  Goal: Attend and participate in unit activities, including therapeutic, recreational, and educational groups  Description  Interventions:  - Provide therapeutic and educational activities daily, encourage attendance and participation, and document same in the medical record     CERTIFIED PEER SPECIALIST INTERVENTIONS:    Complete peer assessment with patient to assess their needs and identify their goals to complete while in the recovery program as well as once discharged into the community  Patient will complete WRAP Plan, Crisis Plan and 5 Life Domains  Patient will attend 50% of groups offered on the unit  Patient will complete a goal card weekly      Outcome: Not Progressing  Goal: Recognize dysfunctional thoughts, communicate reality-based thoughts at the time of discharge  Description  Interventions:  - Provide medication and psycho-education to assist patient in compliance and developing insight into his/her illness   Outcome: Not Progressing  Goal: Complete daily ADLs, including personal hygiene independently, as able  Description  Interventions:  - Observe, teach, and assist patient with ADLS  - Monitor and promote a balance of rest/activity, with adequate nutrition and elimination   Outcome: Not Progressing     Problem: Ineffective Coping  Goal: Demonstrates healthy coping skills  Outcome: Not Progressing  Goal: Participates in unit activities  Description  Interventions:  - Provide therapeutic environment   - Provide required programming   - Redirect inappropriate behaviors   Outcome: Not Progressing     Problem: Risk for Self Injury/Neglect  Goal: Verbalize thoughts and feelings  Description  Interventions:  - Assess and re-assess patient's lethality and potential for self-injury  - Engage patient in 1:1 interactions, daily, for a minimum of 15 minutes  - Encourage patient to express feelings, fears, frustrations, hopes  - Establish rapport/trust with patient   Outcome: Not Progressing  Goal: Attend and participate in unit activities, including therapeutic, recreational, and educational groups  Description  Interventions:  - Provide therapeutic and educational activities daily, encourage attendance and participation, and document same in the medical record  - Obtain collateral information, encourage visitation and family involvement in care   Outcome: Not Progressing  Goal: Recognize maladaptive responses and adopt new coping mechanisms  Outcome: Not Progressing  Goal: Complete daily ADLs, including personal hygiene independently, as able  Description  Interventions:  - Observe, teach, and assist patient with ADLS  - Monitor and promote a balance of rest/activity, with adequate nutrition and elimination  Outcome: Not Progressing     Problem: Depression  Goal: Treatment Goal: Demonstrate behavioral control of depressive symptoms, verbalize feelings of improved mood/affect, and adopt new coping skills prior to discharge  Outcome: Not Progressing  Goal: Verbalize thoughts and feelings  Description  Interventions:  - Assess and re-assess patient's level of risk   - Engage patient in 1:1 interactions, daily, for a minimum of 15 minutes   - Encourage patient to express feelings, fears, frustrations, hopes   Outcome: Not Progressing  Goal: Refrain from isolation  Description  Interventions:  - Develop a trusting relationship   - Encourage socialization   Outcome: Not Progressing  Goal: Refrain from self-neglect  Outcome: Not Progressing     Problem: Nutrition/Hydration-ADULT  Goal: Nutrient/Hydration intake appropriate for improving, restoring or maintaining nutritional needs  Description  Monitor and assess patient's nutrition/hydration status for malnutrition  Collaborate with interdisciplinary team and initiate plan and interventions as ordered  Monitor patient's weight and dietary intake as ordered or per policy  Utilize nutrition screening tool and intervene as necessary  Determine patient's food preferences and provide high-protein, high-caloric foods as appropriate       INTERVENTIONS:  - Monitor oral intake, urinary output, labs, and treatment plans  - Assess nutrition and hydration status and recommend course of action  - Evaluate amount of meals eaten  - Assist patient with eating if necessary   - Allow adequate time for meals  - Recommend/ encourage appropriate diets, oral nutritional supplements, and vitamin/mineral supplements  - Order, calculate, and assess calorie counts as needed  - Recommend, monitor, and adjust tube feedings and TPN/PPN based on assessed needs  - Assess need for intravenous fluids  - Provide specific nutrition/hydration education as appropriate  - Include patient/family/caregiver in decisions related to nutrition  Outcome: Not Progressing   Isolative to her bed except for meds and meals, did not shower or do hygiene and looks disheveled, attended no groups  Continues to eat poorly r/t fears of choking eating 50% of breakfast and 10% of lunch  No other behaviors or issues noted  Continue to monitor

## 2020-02-12 NOTE — PLAN OF CARE
Problem: Alteration in Thoughts and Perception  Goal: Verbalize thoughts and feelings  Description  Interventions:  - Promote a nonjudgmental and trusting relationship with the patient through active listening and therapeutic communication  - Assess patient's level of functioning, behavior and potential for risk  - Engage patient in 1 on 1 interactions for a minimum of 15 minutes each session  - Encourage patient to express fears, feelings, frustrations, and discuss symptoms    - [place] patient to reality, help patient recognize reality-based thinking   - Administer medications as ordered and assess for potential side effects  - Provide the patient education related to the signs and symptoms of the illness and desired effects of prescribed medications  Outcome: Progressing  Goal: Agree to be compliant with medication regime, as prescribed and report medication side effects  Description  Interventions:  - Offer appropriate PRN medication and supervise ingestion; conduct aims, as needed   Outcome: Progressing     Problem: Depression  Goal: Refrain from isolation  Description  Interventions:  - Develop a trusting relationship   - Encourage socialization   Outcome: Not Progressing     Beatrice Moreau early in shift isolating in her room sitting crossed legged on bed peering out the door, calling greetings to select staff  She is not disappointed the outing was postponed today & is resigned to expect it likely Friday  Hoping tomorrow to get a staff member to walk w/her to [employer] shop so she can buy her son a [employer] card  She is pleasant  Came out for supper medicine, ate 25% of meal (egg salad, coffee) & had an Ensure  Still worrying about choking  Sleeping now in bed

## 2020-02-13 NOTE — PLAN OF CARE
Problem: Alteration in Thoughts and Perception  Goal: Agree to be compliant with medication regime, as prescribed and report medication side effects  Description  Interventions:  - Offer appropriate PRN medication and supervise ingestion; conduct aims, as needed   Outcome: Progressing  Goal: Attend and participate in unit activities, including therapeutic, recreational, and educational groups  Description  Interventions:  - Provide therapeutic and educational activities daily, encourage attendance and participation, and document same in the medical record     CERTIFIED PEER SPECIALIST INTERVENTIONS:    Complete peer assessment with patient to assess their needs and identify their goals to complete while in the recovery program as well as once discharged into the community  Patient will complete WRAP Plan, Crisis Plan and 5 Life Domains  Patient will attend 50% of groups offered on the unit  Patient will complete a goal card weekly  Outcome: Progressing     Problem: Depression  Goal: Refrain from isolation  Description  Interventions:  - Develop a trusting relationship   - Encourage socialization   Outcome: Jannette Emery continues in free time to be isolative to room & bed where sleeps  Did come out for meal & ate 75% plus Ensure  Came up for supper medicine  Disheveled, unkempt  With encouragement is now attending the [employer] Group to make [employer] decorations & cards w/peers

## 2020-02-13 NOTE — PLAN OF CARE
Problem: Alteration in Thoughts and Perception  Goal: Treatment Goal: Gain control of psychotic behaviors/thinking, reduce/eliminate presenting symptoms and demonstrate improved reality functioning upon discharge  Outcome: Progressing  Goal: Verbalize thoughts and feelings  Description  Interventions:  - Promote a nonjudgmental and trusting relationship with the patient through active listening and therapeutic communication  - Assess patient's level of functioning, behavior and potential for risk  - Engage patient in 1 on 1 interactions for a minimum of 15 minutes each session  - Encourage patient to express fears, feelings, frustrations, and discuss symptoms    - [place] patient to reality, help patient recognize reality-based thinking   - Administer medications as ordered and assess for potential side effects  - Provide the patient education related to the signs and symptoms of the illness and desired effects of prescribed medications  Outcome: Progressing  Goal: Agree to be compliant with medication regime, as prescribed and report medication side effects  Description  Interventions:  - Offer appropriate PRN medication and supervise ingestion; conduct aims, as needed   Outcome: Progressing  Goal: Attend and participate in unit activities, including therapeutic, recreational, and educational groups  Description  Interventions:  - Provide therapeutic and educational activities daily, encourage attendance and participation, and document same in the medical record     CERTIFIED PEER SPECIALIST INTERVENTIONS:    Complete peer assessment with patient to assess their needs and identify their goals to complete while in the recovery program as well as once discharged into the community  Patient will complete WRAP Plan, Crisis Plan and 5 Life Domains  Patient will attend 50% of groups offered on the unit  Patient will complete a goal card weekly      Outcome: Progressing     Problem: Ineffective Coping  Goal: Participates in unit activities  Description  Interventions:  - Provide therapeutic environment   - Provide required programming   - Redirect inappropriate behaviors   Outcome: Progressing  Goal: Patient/Family verbalizes awareness of resources  Outcome: Progressing  Goal: Understands least restrictive measures  Description  Interventions:  - Utilize least restrictive behavior  Outcome: Progressing     Problem: Risk for Self Injury/Neglect  Goal: Treatment Goal: Remain safe during length of stay, learn and adopt new coping skills, and be free of self-injurious ideation, impulses and acts at the time of discharge  Outcome: Progressing  Goal: Refrain from harming self  Description  Interventions:  - Monitor patient closely, per order  - Develop a trusting relationship  - Supervise medication ingestion, monitor effects and side effects   Outcome: Progressing  Goal: Attend and participate in unit activities, including therapeutic, recreational, and educational groups  Description  Interventions:  - Provide therapeutic and educational activities daily, encourage attendance and participation, and document same in the medical record  - Obtain collateral information, encourage visitation and family involvement in care   Outcome: Progressing     Problem: Depression  Goal: Treatment Goal: Demonstrate behavioral control of depressive symptoms, verbalize feelings of improved mood/affect, and adopt new coping skills prior to discharge  Outcome: Progressing  Goal: Verbalize thoughts and feelings  Description  Interventions:  - Assess and re-assess patient's level of risk   - Engage patient in 1:1 interactions, daily, for a minimum of 15 minutes   - Encourage patient to express feelings, fears, frustrations, hopes   Outcome: Progressing  Goal: Refrain from isolation  Description  Interventions:  - Develop a trusting relationship   - Encourage socialization   Outcome: Progressing     Problem: Anxiety  Goal: Anxiety is at manageable level  Description  Interventions:  - Assess and monitor patient's anxiety level  - Monitor for signs and symptoms of anxiety both physical and emotional (heart palpitations, chest pain, shortness of breath, headaches, nausea, feeling jumpy, restlessness, irritable, apprehensive)  - Collaborate with interdisciplinary team and initiate plan and interventions as ordered  - [place] patient to unit/surroundings  - Explain treatment plan  - Encourage participation in care  - Encourage verbalization of concerns/fears  - Identify coping mechanisms  - Assist in developing anxiety-reducing skills  - Administer/offer alternative therapies  - Limit or eliminate stimulants  Outcome: Progressing     Problem: Alteration in Orientation  Goal: Interact with staff daily  Description  Interventions:  - Assess and re-assess patient's level of orientation  - Engage patient in 1 on 1 interactions, daily, for a minimum of 15 minutes   - Establish rapport/trust with patient   Outcome: Progressing     Problem: PAIN - ADULT  Goal: Verbalizes/displays adequate comfort level or baseline comfort level  Description  Interventions:  - Encourage patient to monitor pain and request assistance  - Assess pain using appropriate pain scale  - Administer analgesics based on type and severity of pain and evaluate response  - Implement non-pharmacological measures as appropriate and evaluate response  - Consider cultural and social influences on pain and pain management  - Notify physician/advanced practitioner if interventions unsuccessful or patient reports new pain  Outcome: Progressing     Problem: SAFETY ADULT  Goal: Patient will remain free of falls  Description  INTERVENTIONS:  - Assess patient frequently for physical needs  -  Identify cognitive and physical deficits and behaviors that affect risk of falls    -  [place] fall precautions as indicated by assessment   - Educate patient/family on patient safety including physical limitations  - Instruct patient to call for assistance with activity based on assessment  - Modify environment to reduce risk of injury  - Consider OT/PT consult to assist with strengthening/mobility  Outcome: Progressing     Problem: Alteration in Thoughts and Perception  Goal: Recognize dysfunctional thoughts, communicate reality-based thoughts at the time of discharge  Description  Interventions:  - Provide medication and psycho-education to assist patient in compliance and developing insight into his/her illness   Outcome: Not Progressing  Goal: Complete daily ADLs, including personal hygiene independently, as able  Description  Interventions:  - Observe, teach, and assist patient with ADLS  - Monitor and promote a balance of rest/activity, with adequate nutrition and elimination   Outcome: Not Progressing     Problem: Risk for Self Injury/Neglect  Goal: Recognize maladaptive responses and adopt new coping mechanisms  Outcome: Not Progressing  Goal: Complete daily ADLs, including personal hygiene independently, as able  Description  Interventions:  - Observe, teach, and assist patient with ADLS  - Monitor and promote a balance of rest/activity, with adequate nutrition and elimination  Outcome: Not Progressing     Problem: Depression  Goal: Refrain from self-neglect  Outcome: Not Progressing     Problem: Nutrition/Hydration-ADULT  Goal: Nutrient/Hydration intake appropriate for improving, restoring or maintaining nutritional needs  Description  Monitor and assess patient's nutrition/hydration status for malnutrition  Collaborate with interdisciplinary team and initiate plan and interventions as ordered  Monitor patient's weight and dietary intake as ordered or per policy  Utilize nutrition screening tool and intervene as necessary  Determine patient's food preferences and provide high-protein, high-caloric foods as appropriate       INTERVENTIONS:  - Monitor oral intake, urinary output, labs, and treatment plans  - Assess nutrition and hydration status and recommend course of action  - Evaluate amount of meals eaten  - Assist patient with eating if necessary   - Allow adequate time for meals  - Recommend/ encourage appropriate diets, oral nutritional supplements, and vitamin/mineral supplements  - Order, calculate, and assess calorie counts as needed  - Recommend, monitor, and adjust tube feedings and TPN/PPN based on assessed needs  - Assess need for intravenous fluids  - Provide specific nutrition/hydration education as appropriate  - Include patient/family/caregiver in decisions related to nutrition  Outcome: Not Progressing   Mostly isolative to her bed except for meds, meals, and IMR group  Did not shower or do hygiene and looks disheveled  Continues to eat poorly r/t fears of choking  Ate 50% of breakfast and 5% of lunch  No other behaviors or issues noted  Continue to monitor

## 2020-02-13 NOTE — PROGRESS NOTES
02/13/20 0900   Team Meeting   Meeting Type Daily Rounds   Team Members Present   Team Members Present Physician;Nurse;   Physician Team Member Dr Kusum Sanabria Team Member Javon Cuellar RN   Care Management Team Member Brenda Pleitez     Patient did not attend 7-3 shift groups but did attend 3-11 shift groups  Slept

## 2020-02-13 NOTE — PROGRESS NOTES
Progress Note - Nina Bello 1955, 60 y o  female MRN: [id number]    [facility] Encounter: [id number]    Primary Care Provider: Kendrick Schultz PA-C   Date and time admitted to hospital: 7/23/2019  5:30 PM        * Schizoaffective disorder, bipolar type [facility])  Assessment & Plan  Patient is anticipating to take a shower this afternoon so that she can be ready for a escorted trip to the [facility] tomorrow  However she continues to appear somewhat disheveled poorly groomed and has not been eating meals all the time as she is afraid of choking even though she knows that she has to swallow small pieces and has to be careful  She claims to feel tired off and on but does attend most of the groups  She has been compliant with her medications  She claims he still feels somewhat dysphoric but is tolerating the increase in Zoloft so far  A lab work has been unremarkable  She is moving her bowels  She does not exhibit or verbalize any overt delusions or hallucinations but remains psychosomatic and does accuse some staff of tampering with her oxygen concentrator and inhalant off and on  She is a picky eater but has not lost much weight  She is sleeping well      Current medications:    Current Facility-Administered Medications:     acetaminophen (TYLENOL) tablet 325 mg, 325 mg, Oral, Q6H PRN, Jill Gayle MD    acetaminophen (TYLENOL) tablet 650 mg, 650 mg, Oral, Q6H PRN, Jill Gayle MD    acetaminophen (TYLENOL) tablet 650 mg, 650 mg, Oral, Q8H PRN, Jill Gayle MD    albuterol (PROVENTIL HFA,VENTOLIN HFA) inhaler 2 puff, 2 puff, Inhalation, Q4H PRN, Jill Gayle MD, 2 puff at 10/11/19 0424    aluminum-magnesium hydroxide-simethicone (MYLANTA) 200-200-20 mg/5 mL oral suspension 15 mL, 15 mL, Oral, Q4H PRN, Jill Gayle MD, 15 mL at 01/26/20 1021    ammonium lactate (LAC-HYDRIN) 12 % lotion 1 application, 1 application, Topical, BID PRN, Jill Gayle MD    benzonatate (TESSALON PERLES) capsule 100 mg, 100 mg, Oral, TID PRN, Chichi Lockett MD    benztropine (COGENTIN) injection 1 mg, 1 mg, Intramuscular, Q8H PRN, Chichi Lockett MD    carbamide peroxide (DEBROX) 6 5 % otic solution 5 drop, 5 drop, Left Ear, BID PRN, Lara Draper MD    cloZAPine (CLOZARIL) tablet 25 mg, 25 mg, Oral, BID, Chichi Lockett MD, 25 mg at 02/12/20 2140    cloZAPine (CLOZARIL) tablet 50 mg, 50 mg, Oral, BID, Chichi Lockett MD, 50 mg at 02/12/20 2140    EPINEPHrine PF (ADRENALIN) 1 mg/mL injection 0 15 mg, 0 15 mg, Intramuscular, Once PRN, Chichi Lockett MD    fluticasone-vilanterol (BREO ELLIPTA) 200-25 MCG/INH inhaler 1 puff, 1 puff, Inhalation, Daily, Chichi Lockett MD, 1 puff at 02/12/20 0835    ketotifen (ZADITOR) 0 025 % ophthalmic solution 1 drop, 1 drop, Right Eye, BID PRN, Chichi Lockett MD    levothyroxine tablet 125 mcg, 125 mcg, Oral, Early Morning, Chichi Lockett MD, 125 mcg at 02/13/20 0557    magnesium hydroxide (MILK OF MAGNESIA) 400 mg/5 mL oral suspension 30 mL, 30 mL, Oral, Daily PRN, Chichi Lockett MD    montelukast (SINGULAIR) tablet 10 mg, 10 mg, Oral, HS, Chichi Lockett MD, 10 mg at 01/16/20 2106    OLANZapine (ZyPREXA) IM injection 5 mg, 5 mg, Intramuscular, Q8H PRN, Chichi Lockett MD    OLANZapine (ZyPREXA) tablet 5 mg, 5 mg, Oral, Q8H PRN, Chichi Lockett MD    ondansetron (ZOFRAN-ODT) dispersible tablet 4 mg, 4 mg, Oral, Q6H PRN, Chichi Lockett MD, 4 mg at 12/09/19 1757    pantoprazole (PROTONIX) EC tablet 40 mg, 40 mg, Oral, Early Morning, Chichi Lockett MD, 40 mg at 02/13/20 0557    polyethylene glycol (MIRALAX) packet 17 g, 17 g, Oral, Daily PRN, Chichi Lockett MD    polyvinyl alcohol (LIQUIFILM TEARS) 1 4 % ophthalmic solution 1 drop, 1 drop, Both Eyes, Q3H PRN, Chichi Lockett MD    sertraline (ZOLOFT) tablet 175 mg, 175 mg, Oral, Daily, Chichi Lockett MD, 175 mg at 02/12/20 0837    sucralfate (CARAFATE) oral suspension 1,000 mg, 1,000 mg, Oral, BID, Beryl Cruz MD, 1,000 mg at 02/13/20 0558   theophylline (JEF-24) 24 hr capsule 200 mg, 200 mg, Oral, Daily, Roberto Shankar MD, 200 mg at 02/12/20 0837    tiotropium [facility]) capsule for inhaler 18 mcg, 18 mcg, Inhalation, Daily, Roberto Shankar MD, 18 mcg at 02/12/20 0836    traZODone (DESYREL) tablet 25 mg, 25 mg, Oral, HS PRN, Roberto Shankar MD  Justification if on more than two antipsychotics: not applicable  Side effects if any: none    Risks , benefits, side effects and precautions of medications discussed with patient and reminded patient to let us know any problems with medications     Objective:     Vital Signs:  Vitals:    02/11/20 1927 02/12/20 0748 02/12/20 1600 02/12/20 2000   BP: 108/64 104/62 106/67 90/62   BP Location: Left arm Right arm Left arm Left arm   Pulse: 90 80 82 98   Resp: 16 18 14 15   Temp: 98 3 °F (36 8 °C) 97 6 °F (36 4 °C) (!) 97 4 °F (36 3 °C) (!) 97 1 °F (36 2 °C)   TempSrc: Temporal  Temporal Temporal   SpO2: 92% 94% 92% 93%   Weight:       Height:         Appearance:  age appropriate and older than stated age poorly groomed wearing the same T-shirt from yesterday with uncombed hair but with good eye contact   Behavior:  deviant, evasive and guarded suspicious at times   Speech:  delayed, increased latency of response and tangential    Mood:  anxious, euphoric and irritable    Affect:  increased in intensity, increased in range, mood-congruent and redirectable   Thought Process:  circumstantial, concrete, goal directed, illogical and perserverative  with tendency to become stilted   Thought Content:  delusions  grandiose and persecutory still accuses some staff of me manipulating her oxygen concentrator and inhalant, no other overt delusions elicited, psychosomatic symptoms present like having difficulty to breathe and difficulty to swallow  Being scared of choking  No overt delusions elicited otherwise  No preoccupation with violence  No current suicidal homicidal thoughts intent or plans reported    No obsessions compulsions or distorted body perception     Perceptual Disturbances: None    Risk Potential: Inability to care for herself and refusal to take showers regularly   Sensorium:  person, place, time/date, situation, day of week, month of year, year and time   Cognition:  grossly intact no deficit in recent or remote memory, aware of current events, no language deficits   Consciousness:  alert and awake    Attention: Fair   Intellect: Average   Insight:  Improving   Judgment: fair needing reminders for showers twice a week      Motor Activity: no abnormal movements         Recent Labs:  Results Reviewed     None          I/O Past 24 hours:  No intake/output data recorded  No intake/output data recorded  Assessment / Plan:     Schizoaffective disorder, bipolar type ([facility] [address] )    Overall status:  Improving slowly  Reason for continued inpatient care: to assess ability to maintain improvement by attending to ADLs and taking showers regular and see how she does on outing with family after another outing with staff escort next week  Acceptance by patient: accepting now  Hopefulness in recovery: living at the [facility] soon  Understanding of medications :  yes  Involved in reintegration process: attending groups and has off grounds and off unit privileges   Trusting in relatoinship with psychiatrist:  Isabel Jacobs now    Recommended Treatment:    Medication changes:  1) none today    Non-pharmacological treatments  1) Continue with individual therapy, group therapy, milieu therapy and occupational therapy using recovery principles and psycho-education about accepting illness and need for treatment     2) encourage to take showers twice a week and cooperate with treatment and adl skills as per her behav  plan  3) reschedule another pass with staff to community this week on Friday before pass with sister to see if she would fully comply with assigned outing possibly later this week and not today  Safety  1) Safety/communication plan established targeting dynamic risk factors above  Discharge Plan to a [facility] at [address] / Coordination of Care    Total floor / unit time spent today 15 minutes  Greater than 50% of total time was spent with the patient and / or family counseling and / or coordination of care  Receptive to listening and learning coping skills for management of symptoms and attending to ADLs  Patient's Rights, confidentiality and exceptions to confidentiality, use of automated medical record, [address] [place] [address] staff access to medical record, and consent to treatment reviewed      Oval MD Joseluis

## 2020-02-13 NOTE — PROGRESS NOTES
[address] reaching volumes of 1100ml, not her best & says is feeling tired tonight  Did come out for HS snack of yogurt & beverage  Came to window for HS medicine except the Singulair  Wearing now her QHS humidified nasal O2 @1L for bed

## 2020-02-13 NOTE — PLAN OF CARE
Problem: PAIN - ADULT  Goal: Verbalizes/displays adequate comfort level or baseline comfort level  Description  Interventions:  - Encourage patient to monitor pain and request assistance  - Assess pain using appropriate pain scale  - Administer analgesics based on type and severity of pain and evaluate response  - Implement non-pharmacological measures as appropriate and evaluate response  - Consider cultural and social influences on pain and pain management  - Notify physician/advanced practitioner if interventions unsuccessful or patient reports new pain  Outcome: Progressing     Problem: SAFETY ADULT  Goal: Patient will remain free of falls  Description  INTERVENTIONS:  - Assess patient frequently for physical needs  -  Identify cognitive and physical deficits and behaviors that affect risk of falls    -  [place] fall precautions as indicated by assessment   - Educate patient/family on patient safety including physical limitations  - Instruct patient to call for assistance with activity based on assessment  - Modify environment to reduce risk of injury  - Consider OT/PT consult to assist with strengthening/mobility  Outcome: Progressing     Problem: SLEEP DISTURBANCE  Goal: Will exhibit normal sleeping pattern  Description  Interventions:  -  Assess the patients sleep pattern, noting recent changes  - Administer medication as ordered  - Decrease environmental stimuli, including noise, as appropriate during the night  - Encourage the patient to actively participate in unit groups and or exercise during the day to enhance ability to achieve adequate sleep at night  - Assess the patient, in the morning, encouraging a description of sleep experience  Outcome: Progressing    Maggie maintained on ongoing fall and SAFE precaution  Kimberlyconrad Primer in bed with eyes closed, breath even and unlabored   On O2 with humidifier @1L/m via nasal cannula   Q 15 minutes rounding   No somatic complaint overnight  No PRN needed for sleep aid   No indication of pain or discomfort   No respiratory distress   Will continue to monitor

## 2020-02-13 NOTE — ASSESSMENT & PLAN NOTE
Patient is anticipating to take a shower this afternoon so that she can be ready for a escorted trip to the [place] tomorrow  However she continues to appear somewhat disheveled poorly groomed and has not been eating meals all the time as she is afraid of choking even though she knows that she has to swallow small pieces and has to be careful  She claims to feel tired off and on but does attend most of the groups  She has been compliant with her medications  She claims he still feels somewhat dysphoric but is tolerating the increase in Zoloft so far  A lab work has been unremarkable  She is moving her bowels  She does not exhibit or verbalize any overt delusions or hallucinations but remains psychosomatic and does accuse some staff of tampering with her oxygen concentrator and inhalant off and on  She is a picky eater but has not lost much weight  She is sleeping well      Current medications:    Current Facility-Administered Medications:     acetaminophen (TYLENOL) tablet 325 mg, 325 mg, Oral, Q6H PRN, Tree Madden MD    acetaminophen (TYLENOL) tablet 650 mg, 650 mg, Oral, Q6H PRN, Tree Madden MD    acetaminophen (TYLENOL) tablet 650 mg, 650 mg, Oral, Q8H PRN, Tree Madden MD    albuterol (PROVENTIL HFA,VENTOLIN HFA) inhaler 2 puff, 2 puff, Inhalation, Q4H PRN, Tree Madden MD, 2 puff at 10/11/19 0424    aluminum-magnesium hydroxide-simethicone (MYLANTA) 200-200-20 mg/5 mL oral suspension 15 mL, 15 mL, Oral, Q4H PRN, Tree Madden MD, 15 mL at 01/26/20 1021    ammonium lactate (LAC-HYDRIN) 12 % lotion 1 application, 1 application, Topical, BID PRN, Tree Madden MD    benzonatate (TESSALON PERLES) capsule 100 mg, 100 mg, Oral, TID PRN, Tree Madden MD    benztropine (COGENTIN) injection 1 mg, 1 mg, Intramuscular, Q8H PRN, Tree Madden MD    carbamide peroxide (DEBROX) 6 5 % otic solution 5 drop, 5 drop, Left Ear, BID PRN, Isauro Lopez MD    cloZAPine (CLOZARIL) tablet 25 mg, 25 mg, Oral, BID, Jeet Levine MD, 25 mg at 02/12/20 2140    cloZAPine (CLOZARIL) tablet 50 mg, 50 mg, Oral, BID, Jeet Levine MD, 50 mg at 02/12/20 2140    EPINEPHrine PF (ADRENALIN) 1 mg/mL injection 0 15 mg, 0 15 mg, Intramuscular, Once PRN, Jeet Levine MD    fluticasone-vilanterol (BREO ELLIPTA) 200-25 MCG/INH inhaler 1 puff, 1 puff, Inhalation, Daily, Jeet Levine MD, 1 puff at 02/12/20 0835    ketotifen (ZADITOR) 0 025 % ophthalmic solution 1 drop, 1 drop, Right Eye, BID PRN, Jeet Levine MD    levothyroxine tablet 125 mcg, 125 mcg, Oral, Early Morning, Jeet Levine MD, 125 mcg at 02/13/20 0557    magnesium hydroxide (MILK OF MAGNESIA) 400 mg/5 mL oral suspension 30 mL, 30 mL, Oral, Daily PRN, Jeet Levine MD    montelukast (SINGULAIR) tablet 10 mg, 10 mg, Oral, HS, Jeet Levine MD, 10 mg at 01/16/20 2106    OLANZapine (ZyPREXA) IM injection 5 mg, 5 mg, Intramuscular, Q8H PRN, Jeet Levine MD    OLANZapine (ZyPREXA) tablet 5 mg, 5 mg, Oral, Q8H PRN, Jeet Levine MD    ondansetron (ZOFRAN-ODT) dispersible tablet 4 mg, 4 mg, Oral, Q6H PRN, Jeet Levine MD, 4 mg at 12/09/19 1757    pantoprazole (PROTONIX) EC tablet 40 mg, 40 mg, Oral, Early Morning, Jeet Levine MD, 40 mg at 02/13/20 0557    polyethylene glycol (MIRALAX) packet 17 g, 17 g, Oral, Daily PRN, Jeet Levine MD    polyvinyl alcohol (LIQUIFILM TEARS) 1 4 % ophthalmic solution 1 drop, 1 drop, Both Eyes, Q3H PRN, Jeet Levine MD    sertraline (ZOLOFT) tablet 175 mg, 175 mg, Oral, Daily, Jeet Levine MD, 175 mg at 02/12/20 0837    sucralfate (CARAFATE) oral suspension 1,000 mg, 1,000 mg, Oral, BID, Cat Torres MD, 1,000 mg at 02/13/20 0558    theophylline (JEF-24) 24 hr capsule 200 mg, 200 mg, Oral, Daily, Jeet Levine MD, 200 mg at 02/12/20 0837    tiotropium (SPIRIVA) capsule for inhaler 18 mcg, 18 mcg, Inhalation, Daily, Jeet Levine MD, 18 mcg at 02/12/20 0836    traZODone (DESYREL) tablet 25 mg, 25 mg, Oral, HS PRN, Jeet Levine, MD  Justification if on more than two antipsychotics: not applicable  Side effects if any: none    Risks , benefits, side effects and precautions of medications discussed with patient and reminded patient to let us know any problems with medications     Objective:     Vital Signs:  Vitals:    02/11/20 1927 02/12/20 0748 02/12/20 1600 02/12/20 2000   BP: 108/64 104/62 106/67 90/62   BP Location: Left arm Right arm Left arm Left arm   Pulse: 90 80 82 98   Resp: 16 18 14 15   Temp: 98 3 °F (36 8 °C) 97 6 °F (36 4 °C) (!) 97 4 °F (36 3 °C) (!) 97 1 °F (36 2 °C)   TempSrc: Temporal  Temporal Temporal   SpO2: 92% 94% 92% 93%   Weight:       Height:         Appearance:  age appropriate and older than stated age poorly groomed wearing the same T-shirt from yesterday with uncombed hair but with good eye contact   Behavior:  deviant, evasive and guarded suspicious at times   Speech:  delayed, increased latency of response and tangential    Mood:  anxious, euphoric and irritable    Affect:  increased in intensity, increased in range, mood-congruent and redirectable   Thought Process:  circumstantial, concrete, goal directed, illogical and perserverative  with tendency to become stilted   Thought Content:  delusions  grandiose and persecutory still accuses some staff of me manipulating her oxygen concentrator and inhalant, no other overt delusions elicited, psychosomatic symptoms present like having difficulty to breathe and difficulty to swallow  Being scared of choking  No overt delusions elicited otherwise  No preoccupation with violence  No current suicidal homicidal thoughts intent or plans reported    No obsessions compulsions or distorted body perception     Perceptual Disturbances: None    Risk Potential: Inability to care for herself and refusal to take showers regularly   Sensorium:  person, place, time/date, situation, day of week, month of year, year and time   Cognition:  grossly intact no deficit in recent or remote memory, aware of current events, no language deficits   Consciousness:  alert and awake    Attention: Fair   Intellect: Average   Insight:  Improving   Judgment: fair needing reminders for showers twice a week      Motor Activity: no abnormal movements         Recent Labs:  Results Reviewed     None          I/O Past 24 hours:  No intake/output data recorded  No intake/output data recorded  Assessment / Plan:     Schizoaffective disorder, bipolar type ([address] )      Reason for continued inpatient care: to assess ability to maintain improvement by attending to ADLs and taking showers regular and see how she does on outing with family after another outing with staff escort next week  Acceptance by patient: accepting now  Hopefulness in recovery: living at the [facility] soon  Understanding of medications :  yes  Involved in reintegration process: attending groups and has off grounds and off unit privileges   Trusting in relatoinship with psychiatrist:  Santos Fry now    Recommended Treatment:    Medication changes:  1) none today    Non-pharmacological treatments  1) Continue with individual therapy, group therapy, milieu therapy and occupational therapy using recovery principles and psycho-education about accepting illness and need for treatment   2) encourage to take showers twice a week and cooperate with treatment and adl skills as per her behav  plan  3) reschedule another pass with staff to community this week on Friday before pass with sister to see if she would fully comply with assigned outing possibly later this week and not today  Safety  1) Safety/communication plan established targeting dynamic risk factors above  Discharge Plan to a [facility] at [address] / Coordination of Care    Total floor / unit time spent today 15 minutes  Greater than 50% of total time was spent with the patient and / or family counseling and / or coordination of care    Receptive to listening and learning coping skills for management of symptoms and attending to ADLs  Patient's Rights, confidentiality and exceptions to confidentiality, use of automated medical record, Jeb Patrick staff access to medical record, and consent to treatment reviewed      Roberto [place] MD

## 2020-02-13 NOTE — PLAN OF CARE
Problem: Alteration in Thoughts and Perception  Goal: Verbalize thoughts and feelings  Description  Interventions:  - Promote a nonjudgmental and trusting relationship with the patient through active listening and therapeutic communication  - Assess patient's level of functioning, behavior and potential for risk  - Engage patient in 1 on 1 interactions for a minimum of 15 minutes each session  - Encourage patient to express fears, feelings, frustrations, and discuss symptoms    - [place] patient to reality, help patient recognize reality-based thinking   - Administer medications as ordered and assess for potential side effects  - Provide the patient education related to the signs and symptoms of the illness and desired effects of prescribed medications  Outcome: Progressing  Goal: Agree to be compliant with medication regime, as prescribed and report medication side effects  Description  Interventions:  - Offer appropriate PRN medication and supervise ingestion; conduct aims, as needed   Outcome: Progressing  Goal: Complete daily ADLs, including personal hygiene independently, as able  Description  Interventions:  - Observe, teach, and assist patient with ADLS  - Monitor and promote a balance of rest/activity, with adequate nutrition and elimination   Outcome: Progressing     Problem: Ineffective Coping  Goal: Participates in unit activities  Description  Interventions:  - Provide therapeutic environment   - Provide required programming   - Redirect inappropriate behaviors   Outcome: Progressing     Problem: Risk for Self Injury/Neglect  Goal: Refrain from harming self  Description  Interventions:  - Monitor patient closely, per order  - Develop a trusting relationship  - Supervise medication ingestion, monitor effects and side effects   Outcome: Progressing     Problem: Depression  Goal: Refrain from isolation  Description  Interventions:  - Develop a trusting relationship   - Encourage socialization   Outcome: Progressing     Problem: Alteration in Orientation  Goal: Interact with staff daily  Description  Interventions:  - Assess and re-assess patient's level of orientation  - Engage patient in 1 on 1 interactions, daily, for a minimum of 15 minutes   - Establish rapport/trust with patient   Outcome: Progressing     Problem: SAFETY ADULT  Goal: Patient will remain free of falls  Description  INTERVENTIONS:  - Assess patient frequently for physical needs  -  Identify cognitive and physical deficits and behaviors that affect risk of falls  -  [place] fall precautions as indicated by assessment   - Educate patient/family on patient safety including physical limitations  - Instruct patient to call for assistance with activity based on assessment  - Modify environment to reduce risk of injury  - Consider OT/PT consult to assist with strengthening/mobility  Outcome: Rina Montesinos was in her room at the beginning of the shift  Showered this evening  Stated that she was "worn out" after her shower  "I'm not like other people, I get really tired"  She had [employer] button her shirt for her  Needed several prompts to come for medication  Took medication without difficulty  Only ate 25% of her meal  She told [employer], "it's because the shower wore me out"  Sat in the dining room talking to peer and staff member  Attended Women's group and evening group  Took HS medication  Ate snack  Oxygen saturation 92% on room air prior to oxygen at 1 liter via nasal cannula (humidified) applied  Continue to monitor  Precautions maintained

## 2020-02-14 NOTE — PROGRESS NOTES
Progress Note - Felicitas Alu 1955, 60 y o  female MRN: [id number]    Unit/Bed#: [facility] 110-02 Encounter: [id number]    Primary Care Provider: Reggie Berry PA-C   Date and time admitted to hospital: 7/23/2019  5:30 PM        * Schizoaffective disorder, bipolar type [facility])  Assessment & Plan  Patient did take a shower reluctantly in anticipation for the outing today to go to [employer]  He states it was very tiring for her but she went through it anyway  She remains well groomed well kept and has been attending most of the groups  She is still eating sporadically but without any weight loss  She continues to express some psychosomatic symptoms like inability to breathe or difficulty swallowing  She is sleeping fairly well  She continues to talk in a stilted manner as usual   He no overt hallucinations or delusions elicited even though she still accuses some of the staff of tampering with her oxygen concentrator in inhalant which is her baseline  She has tolerated medications  She is compliant with medications and reports no side effects so far    Still scared that she may get choked and she knows that she needs to swallow food after cutting them as small pieces    Current medications:    Current Facility-Administered Medications:     acetaminophen (TYLENOL) tablet 325 mg, 325 mg, Oral, Q6H PRN, Jerome Lopez MD    acetaminophen (TYLENOL) tablet 650 mg, 650 mg, Oral, Q6H PRN, Jerome Lopez MD    acetaminophen (TYLENOL) tablet 650 mg, 650 mg, Oral, Q8H PRN, Jerome Lopez MD    albuterol (PROVENTIL HFA,VENTOLIN HFA) inhaler 2 puff, 2 puff, Inhalation, Q4H PRN, Jerome Lopez MD, 2 puff at 10/11/19 0424    aluminum-magnesium hydroxide-simethicone (MYLANTA) 200-200-20 mg/5 mL oral suspension 15 mL, 15 mL, Oral, Q4H PRN, Jerome Lopez MD, 15 mL at 01/26/20 1021    ammonium lactate (LAC-HYDRIN) 12 % lotion 1 application, 1 application, Topical, BID PRN, Jerome Lopez MD    bacitracin topical ointment 1 small application, 1 small application, Topical, Daily, ABILIO Herndon    benzonatate (TESSALON PERLES) capsule 100 mg, 100 mg, Oral, TID PRN, Ivonne Nevarez MD    benztropine (COGENTIN) injection 1 mg, 1 mg, Intramuscular, Q8H PRN, Ivonne Nevarez MD    carbamide peroxide (DEBROX) 6 5 % otic solution 5 drop, 5 drop, Left Ear, BID PRN, Sage Gavin MD    cloZAPine (CLOZARIL) tablet 25 mg, 25 mg, Oral, BID, Ivonne Nevarez MD, 25 mg at 02/13/20 2054    cloZAPine (CLOZARIL) tablet 50 mg, 50 mg, Oral, BID, Ivonne Nevarez MD, 50 mg at 02/13/20 2054    EPINEPHrine PF (ADRENALIN) 1 mg/mL injection 0 15 mg, 0 15 mg, Intramuscular, Once PRN, Ivonne Nevarez MD    fluticasone-vilanterol (BREO ELLIPTA) 200-25 MCG/INH inhaler 1 puff, 1 puff, Inhalation, Daily, Ivonne Nevarez MD, 1 puff at 02/13/20 0826    ketotifen (ZADITOR) 0 025 % ophthalmic solution 1 drop, 1 drop, Right Eye, BID PRN, Ivonne Nevarez MD    levothyroxine tablet 125 mcg, 125 mcg, Oral, Early Morning, Ivonne Nevarez MD, 125 mcg at 02/14/20 0606    magnesium hydroxide (MILK OF MAGNESIA) 400 mg/5 mL oral suspension 30 mL, 30 mL, Oral, Daily PRN, Ivonne Nevarez MD    montelukast (SINGULAIR) tablet 10 mg, 10 mg, Oral, HS, Ivonne Nevarez MD, 10 mg at 01/16/20 2106    OLANZapine (ZyPREXA) IM injection 5 mg, 5 mg, Intramuscular, Q8H PRN, Ivonne Nevarez MD    OLANZapine (ZyPREXA) tablet 5 mg, 5 mg, Oral, Q8H PRN, Ivonne Nevarez MD    ondansetron (ZOFRAN-ODT) dispersible tablet 4 mg, 4 mg, Oral, Q6H PRN, Ivonne Nevarez MD, 4 mg at 12/09/19 1757    pantoprazole (PROTONIX) EC tablet 40 mg, 40 mg, Oral, Early Morning, Ivonne Nevarez MD, 40 mg at 02/14/20 0606    polyethylene glycol (MIRALAX) packet 17 g, 17 g, Oral, Daily PRN, Ivonne Nevarez MD    polyvinyl alcohol (LIQUIFILM TEARS) 1 4 % ophthalmic solution 1 drop, 1 drop, Both Eyes, Q3H PRN, Ivonne Nevarez MD    sertraline (ZOLOFT) tablet 175 mg, 175 mg, Oral, Daily, Ivonne Nevarez MD, 175 mg at 02/13/20 0826    sucralfate (CARAFATE) oral suspension 1,000 mg, 1,000 mg, Oral, BID, Cat Torres MD, 1,000 mg at 02/14/20 0606    theophylline (JEF-24) 24 hr capsule 200 mg, 200 mg, Oral, Daily, Zander Yanez MD, 200 mg at 02/13/20 2733    tiotropium [facility]) capsule for inhaler 18 mcg, 18 mcg, Inhalation, Daily, Zander Yanez MD, 18 mcg at 02/13/20 3667    traZODone (DESYREL) tablet 25 mg, 25 mg, Oral, HS PRN, Zander Yanez MD  Justification if on more than two antipsychotics: not applicable  Side effects if any: none    Risks , benefits, side effects and precautions of medications discussed with patient and reminded patient to let us know any problems with medications     Objective:     Vital Signs:  Vitals:    02/13/20 0732 02/13/20 1615 02/13/20 2015 02/13/20 2151   BP: 139/58 90/64 125/58    BP Location:  Left arm Left arm    Pulse: 81 93 83 67   Resp:  18 18    Temp:  98 2 °F (36 8 °C) (!) 96 9 °F (36 1 °C)    TempSrc:  Temporal Temporal    SpO2:  92% 91% 92%   Weight:       Height:         Appearance:  age appropriate and older than stated age well groomed wearing new clothing pleasant friendly cooperative with hair well combed   Behavior:  deviant, evasive and guarded suspicious at times but overall pleasant   Speech:  delayed, increased latency of response and tangential    Mood:  anxious, euphoric and irritable    Affect:  increased in intensity, increased in range, mood-congruent and redirectable   Thought Process:  circumstantial, concrete, goal directed, illogical and perserverative with stilted speech   Thought Content:  delusions  grandiose and persecutory still delusional and paranoid about certain staff tampering with her oxygen concentrate inhaler, still with some psychosomatic symptoms, no overt delusions elicited otherwise  No current suicidal homicidal thoughts intent or plans reported  No obsessions compulsions or distorted body perception is a elicited  No preoccupation with violence        Perceptual Disturbances: None Risk Potential: Inability to care for herself and refusal to take showers regularly   Sensorium:  person, place, time/date, situation, day of week, month of year, year and time   Cognition:  grossly intact no language deficits, aware of current events and no deficit in recent or remote   Consciousness:  alert and awake    Attention: Fair   Intellect: Average   Insight:  Improved   Judgment: fair       Motor Activity: no abnormal movements         Recent Labs:  Results Reviewed     None          I/O Past 24 hours:  No intake/output data recorded  No intake/output data recorded  Assessment / Plan:     Schizoaffective disorder, bipolar type ([facility] [address] )  Overall status:  I improving    Reason for continued inpatient care: to assess ability to maintain improvement by attending to ADLs and taking showers regular and see how she does on outing with family after another outing with staff escort next week  Acceptance by patient: accepting now  Hopefulness in recovery: living at the [facility] soon  Understanding of medications :  yes  Involved in reintegration process: attending groups and has off grounds and off unit privileges   Trusting in relatoinship with psychiatrist:  Khalif Elliott now    Recommended Treatment:    Medication changes:  1) none today    Non-pharmacological treatments  1) Continue with individual therapy, group therapy, milieu therapy and occupational therapy using recovery principles and psycho-education about accepting illness and need for treatment   2) encourage to take showers twice a week and cooperate with treatment and adl skills as per her behav  plan  3) reschedule another pass with staff to community this week on Friday before pass with sister to see if she would fully comply with assigned outing possibly later this week and not today  Safety  1) Safety/communication plan established targeting dynamic risk factors above    Discharge Plan to a [facility] at [address]  / Coordination of Care    Total floor / unit time spent today 15 minutes  Greater than 50% of total time was spent with the patient and / or family counseling and / or coordination of care  Receptive to listening and learning coping skills for management of symptoms and attending to ADLs  Patient's Rights, confidentiality and exceptions to confidentiality, use of automated medical record, Jeb Patrick staff access to medical record, and consent to treatment reviewed      Alirio Frazier MD

## 2020-02-14 NOTE — PROGRESS NOTES
Patient returned from pass with staff at 1130  Staff reported that the patient did not do well while out in the community  Presented as very anxious and clingy with staff  Would not let staff MHT arm go  Patitent was afraid she "would float away"  Patient constantly repositioning herself closer to exit doors when turning while walking  While at [facility] patient refused to leave her seat to get in line to order her own coffee "I don't want to"  Spoke with patient and asked her how she thought the pass went  She said that she has a fear of open spaces, like she will "float away"  Presented to her that she had gone to a park and sat outside on the last pass with staff and had no problems with that experience  She replied saying "that's because there was ground there, you know, earth"  Then went on to say that she is ok with open spaces in a country environment, but not in urban areas "too much concrete and too much glass"  Asked her "what about in an open field in the [facility], that's a lot of open space"  She explained the fear is still there but is more tolerable for a short time but that she still feels the need to run inside  Encouraged the patient to communicate what we talked about to the treatment team and further explore these feelings with her therapist  She was in agreement  She then went on to say how she feels uncomfortable around the staff male   Called him a "gigalo" and "high maintenance" and said that she'd rather not go with him again  After talking with male  he reported that the patient had tried to get the  to stop somewhere to  cigarettes so that she could smoke but unlike the last time when they deviated from the plan, he stuck to the agreed upon plan and only went to the designated pass location, and because of that the patient had told him that he was "being mean to her"  Continue to monitor

## 2020-02-14 NOTE — ASSESSMENT & PLAN NOTE
Patient did take a shower reluctantly in anticipation for the outing today to go to [facility]  He states it was very tiring for her but she went through it anyway  She remains well groomed well kept and has been attending most of the groups  She is still eating sporadically but without any weight loss  She continues to express some psychosomatic symptoms like inability to breathe or difficulty swallowing  She is sleeping fairly well  She continues to talk in a stilted manner as usual   He no overt hallucinations or delusions elicited even though she still accuses some of the staff of tampering with her oxygen concentrator in inhalant which is her baseline  She has tolerated medications  She is compliant with medications and reports no side effects so far    Still scared that she may get choked and she knows that she needs to swallow food after cutting them as small pieces    Current medications:    Current Facility-Administered Medications:     acetaminophen (TYLENOL) tablet 325 mg, 325 mg, Oral, Q6H PRN, Jill Gayle MD    acetaminophen (TYLENOL) tablet 650 mg, 650 mg, Oral, Q6H PRN, Jill Gayle MD    acetaminophen (TYLENOL) tablet 650 mg, 650 mg, Oral, Q8H PRN, Jill Gayle MD    albuterol (PROVENTIL HFA,VENTOLIN HFA) inhaler 2 puff, 2 puff, Inhalation, Q4H PRN, Jill Gayle MD, 2 puff at 10/11/19 0424    aluminum-magnesium hydroxide-simethicone (MYLANTA) 200-200-20 mg/5 mL oral suspension 15 mL, 15 mL, Oral, Q4H PRN, Jill Gayle MD, 15 mL at 01/26/20 1021    ammonium lactate (LAC-HYDRIN) 12 % lotion 1 application, 1 application, Topical, BID PRN, Jill Gayle MD    bacitracin topical ointment 1 small application, 1 small application, Topical, Daily, Jordon Sails, CRNP    benzonatate (TESSALON PERLES) capsule 100 mg, 100 mg, Oral, TID PRN, Jill Gayle MD    benztropine (COGENTIN) injection 1 mg, 1 mg, Intramuscular, Q8H PRN, Jill Gayle MD    carbamide peroxide (DEBROX) 6 5 % otic solution 5 drop, 5 drop, Left Ear, BID PRN, Castro Grant MD    cloZAPine (CLOZARIL) tablet 25 mg, 25 mg, Oral, BID, Aimna Aparicio MD, 25 mg at 02/13/20 2054    cloZAPine (CLOZARIL) tablet 50 mg, 50 mg, Oral, BID, Amina Aparicio MD, 50 mg at 02/13/20 2054    EPINEPHrine PF (ADRENALIN) 1 mg/mL injection 0 15 mg, 0 15 mg, Intramuscular, Once PRN, Amina Aparicio MD    fluticasone-vilanterol (BREO ELLIPTA) 200-25 MCG/INH inhaler 1 puff, 1 puff, Inhalation, Daily, Amina Aparicio MD, 1 puff at 02/13/20 0826    ketotifen (ZADITOR) 0 025 % ophthalmic solution 1 drop, 1 drop, Right Eye, BID PRN, Amina Aparicio MD    levothyroxine tablet 125 mcg, 125 mcg, Oral, Early Morning, Amina Aparicio MD, 125 mcg at 02/14/20 0606    magnesium hydroxide (MILK OF MAGNESIA) 400 mg/5 mL oral suspension 30 mL, 30 mL, Oral, Daily PRN, Amina Aparicio MD    montelukast (SINGULAIR) tablet 10 mg, 10 mg, Oral, HS, Amina Aparicio MD, 10 mg at 01/16/20 2106    OLANZapine (ZyPREXA) IM injection 5 mg, 5 mg, Intramuscular, Q8H PRN, Amina Aparicio MD    OLANZapine (ZyPREXA) tablet 5 mg, 5 mg, Oral, Q8H PRN, Amina Aparicio MD    ondansetron (ZOFRAN-ODT) dispersible tablet 4 mg, 4 mg, Oral, Q6H PRN, Amina Aparicio MD, 4 mg at 12/09/19 1757    pantoprazole (PROTONIX) EC tablet 40 mg, 40 mg, Oral, Early Morning, Amina Aparicio MD, 40 mg at 02/14/20 0606    polyethylene glycol (MIRALAX) packet 17 g, 17 g, Oral, Daily PRN, Amina Aparicio MD    polyvinyl alcohol (LIQUIFILM TEARS) 1 4 % ophthalmic solution 1 drop, 1 drop, Both Eyes, Q3H PRN, Amina Aparicio MD    sertraline (ZOLOFT) tablet 175 mg, 175 mg, Oral, Daily, Amina Aparicio MD, 175 mg at 02/13/20 3079    sucralfate (CARAFATE) oral suspension 1,000 mg, 1,000 mg, Oral, BID, Cat Torres MD, 1,000 mg at 02/14/20 0606    theophylline (JEF-24) 24 hr capsule 200 mg, 200 mg, Oral, Daily, Amina Aparicio MD, 200 mg at 02/13/20 0826    tiotropium (SPIRIVA) capsule for inhaler 18 mcg, 18 mcg, Inhalation, Daily, Amina Aparicio MD, 95 mcg at 02/13/20 8627    traZODone (DESYREL) tablet 25 mg, 25 mg, Oral, HS PRN, Sarah Hanna MD  Justification if on more than two antipsychotics: not applicable  Side effects if any: none    Risks , benefits, side effects and precautions of medications discussed with patient and reminded patient to let us know any problems with medications     Objective:     Vital Signs:  Vitals:    02/13/20 0732 02/13/20 1615 02/13/20 2015 02/13/20 2151   BP: 139/58 90/64 125/58    BP Location:  Left arm Left arm    Pulse: 81 93 83 67   Resp:  18 18    Temp:  98 2 °F (36 8 °C) (!) 96 9 °F (36 1 °C)    TempSrc:  Temporal Temporal    SpO2:  92% 91% 92%   Weight:       Height:         Appearance:  age appropriate and older than stated age well groomed wearing new clothing pleasant friendly cooperative with hair well combed   Behavior:  deviant, evasive and guarded suspicious at times but overall pleasant   Speech:  delayed, increased latency of response and tangential    Mood:  anxious, euphoric and irritable    Affect:  increased in intensity, increased in range, mood-congruent and redirectable   Thought Process:  circumstantial, concrete, goal directed, illogical and perserverative with stilted speech   Thought Content:  delusions  grandiose and persecutory still delusional and paranoid about certain staff tampering with her oxygen concentrate inhaler, still with some psychosomatic symptoms, no overt delusions elicited otherwise  No current suicidal homicidal thoughts intent or plans reported  No obsessions compulsions or distorted body perception is a elicited  No preoccupation with violence        Perceptual Disturbances: None    Risk Potential: Inability to care for herself and refusal to take showers regularly   Sensorium:  person, place, time/date, situation, day of week, month of year, year and time   Cognition:  grossly intact no language deficits, aware of current events and no deficit in recent or remote   Consciousness: alert and awake    Attention: Fair   Intellect: Average   Insight:  Improved   Judgment: fair       Motor Activity: no abnormal movements         Recent Labs:  Results Reviewed     None          I/O Past 24 hours:  No intake/output data recorded  No intake/output data recorded  Assessment / Plan:     Schizoaffective disorder, bipolar type ([facility] [address] )  Overall status:  I improving    Reason for continued inpatient care: to assess ability to maintain improvement by attending to ADLs and taking showers regular and see how she does on outing with family after another outing with staff escort next week  Acceptance by patient: accepting now  Hopefulness in recovery: living at the [facility] soon  Understanding of medications :  yes  Involved in reintegration process: attending groups and has off grounds and off unit privileges   Trusting in relatoinship with psychiatrist:  Lazara Calles now    Recommended Treatment:    Medication changes:  1) none today    Non-pharmacological treatments  1) Continue with individual therapy, group therapy, milieu therapy and occupational therapy using recovery principles and psycho-education about accepting illness and need for treatment   2) encourage to take showers twice a week and cooperate with treatment and adl skills as per her behav  plan  3) reschedule another pass with staff to community this week on Friday before pass with sister to see if she would fully comply with assigned outing possibly later this week and not today  Safety  1) Safety/communication plan established targeting dynamic risk factors above  Discharge Plan to a [facility] at [address] [facility] [address] / Coordination of Care    Total floor / unit time spent today 15 minutes  Greater than 50% of total time was spent with the patient and / or family counseling and / or coordination of care  Receptive to listening and learning coping skills for management of symptoms and attending to ADLs       Patient's Rights, confidentiality and exceptions to confidentiality, use of automated medical record, [facility] staff access to medical record, and consent to treatment reviewed      Roberto Colorado MD

## 2020-02-14 NOTE — PROGRESS NOTES
Skin tear on right wrist reassessed and picture taken for chart (see flowsheets)  Bacitracin ointment and new band aid applied

## 2020-02-14 NOTE — TREATMENT TEAM
[facility] INDIVIDUAL SESSION NOTE  Patient appeared distracted and was sitting by self  When approached, expressed concern regarding upcoming pass, stating "I need to get a shower, and it's not my usual people I am comfortable with to help me"  Able to express associated worries/frustrations regarding needing assistance, but also desires to go out on pass and understands that doing this ADL is necessary  Demonstrated ability to work through concern with minimal encouragement, and appeared calmer once talking about this topic       02/13/20 1600   Activity/Group Checklist   Group Hygiene  (Individual Session Showering Routine)   Attendance Attended   Attendance Duration (min) 0-15   Interactions Interacted appropriately   Affect/Mood Appropriate   Goals Achieved Identified feelings; Identified triggers; Able to manage/cope with feelings; Able to self-disclose; Able to recieve feedback

## 2020-02-14 NOTE — PROGRESS NOTES
Maggie sitting in the dinning room awaiting for lab work  This writer noted blood from the wrist wrist   Area wipe and notice a skin tear  Question Katty Bowles if she had bump her wrist, she couldn't recall  The skin tear measures on the right upper wrist 0 8 cm x 0 7 cm, intact  Obtain order from [facility], cleanse with wound cleanser solution, pat dry , bacitracin ointment and cover with bandaid

## 2020-02-14 NOTE — PROGRESS NOTES
Progress Note - Behavioral Health     Eric Washington 60 y o  female MRN: [id number]   [facility] Encounter: [id number]    Per Nursing: Nursing reports that patient was present on the unit during the [employer] day social last evening  She has been compliant with her medications  She has been isolative to her room at times  Per Patient: Patient states that she is sleeping right now, and she does not want to talk  She states that she slept well last evening, but she still wants to take a nap right now  She then becomes agreeable to speaking with Provider and states that her mood is depressed  Patient denies any thoughts of wanting to harm self or others  Patient denies any recent self-injurious behaviors  Patient denies any active or passive suicidal ideation, intent or plan  Patient is able to contract for safety at the present time  Patient remains future-oriented and goal-directed, as well as motivated and help seeking  She states that she is worried  She is having anxiety because she is afraid that she is going to die  She states that she doesn't want to die, but she is afraid of dying  She denies any auditory or visual hallucinations  She does become pleasant toward the end of the interview, and resumes her afternoon nap  Behavior over the last 24 hours: unchanged  Sleep: normal  Appetite: normal  Medication side effects: No   ROS: no complaints, all other systems are negative  Any positives in the Comprehensive Review of Systems were noted in the HPI  All other Review of Systems were negative          Mental Status Evaluation:    Appearance:  age appropriate, laying in bed   Behavior:  guarded, uncooperative, initially guarded but then becomes pleasant   Speech:  normal rate and volume   Mood:  depressed, anxious, irritable   Affect:  constricted   Thought Process:  linear   Associations: intact associations   Thought Content:  no overt delusions, negative thoughts, ruminating thoughts, afraid that she is going to die   Perceptual Disturbances: denies auditory hallucinations when asked, does not appear responding to internal stimuli   Risk Potential: Suicidal ideation - None at present, contracts for safety on the unit  Homicidal ideation - None at present  Potential for aggression - Not at present   Sensorium:  oriented to person, place and time/date   Memory:  recent and remote memory grossly intact   Consciousness:  alert and awake   Attention: attention span and concentration appear shorter than expected for age   Insight:  limited   Judgment: limited   Gait/Station: in bed   Motor Activity: no abnormal movements     Vital signs in last 24 hours:  Vitals:    02/15/20 0730   BP: 108/75   Pulse: 81   Resp: 17   Temp: 98 3 °F (36 8 °C)   SpO2:          Laboratory results:   I have personally reviewed all pertinent laboratory/tests results    Most Recent Labs:   Lab Results   Component Value Date    WBC 6 30 02/15/2020    RBC 4 57 02/15/2020    HGB 14 0 02/15/2020    HCT 41 8 02/15/2020     02/15/2020    RDW 15 4 (H) 02/15/2020    NEUTROABS 3 30 02/15/2020    SODIUM 140 10/25/2019    K 4 1 10/25/2019     10/25/2019    CO2 29 10/25/2019    BUN 16 10/25/2019    CREATININE 0 75 10/25/2019    GLUC 93 10/25/2019    GLUF 93 10/25/2019    CALCIUM 9 1 10/25/2019    AST 23 10/25/2019    ALT 25 10/25/2019    ALKPHOS 104 10/25/2019    TP 6 3 10/25/2019    ALB 3 4 10/25/2019    TBILI 0 30 10/25/2019    CHOLESTEROL 210 (H) 10/25/2019    HDL 38 (L) 10/25/2019    TRIG 134 10/25/2019    LDLCALC 145 (H) 10/25/2019    [place] 172 10/25/2019    LITHIUM <0 2 (L) 05/01/2019    AMMONIA 13 02/22/2016    DYX9WWPSOJRZ 0 985 08/21/2019    FREET4 1 4 05/12/2015    RPR Non-Reactive 05/02/2019    HGBA1C 5 5 01/17/2019     01/17/2019       Progress Toward Goals: progressing    Assessment/Plan   Principal Problem:    [facility]ffective [facility]  Active Problems:    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[facility] ) Acquired hypothyroidism    Gastroesophageal reflux disease without esophagitis    At risk for aspiration    Recommended Treatment:     Planned medication and treatment changes:     All current active medications have been reviewed  Encourage group therapy, milieu therapy and occupational therapy  Behavioral Health checks every 7 minutes  Continue current medications:    Current Facility-Administered Medications:  acetaminophen 325 mg Oral Q6H PRN Shilpa Trujillo MD   acetaminophen 650 mg Oral Q6H PRN Shilpa Trujillo MD   acetaminophen 650 mg Oral Q8H PRN Shilpa Trujillo MD   albuterol 2 puff Inhalation Q4H PRN Shilpa Trujillo MD   aluminum-magnesium hydroxide-simethicone 15 mL Oral Q4H PRN Shilpa Trujillo MD   ammonium lactate 1 application Topical BID PRN Shilpa Trujillo MD   bacitracin 1 small application Topical Daily [employer], CRNP   benzonatate 100 mg Oral TID PRN Shilpa Trujillo MD   benztropine 1 mg Intramuscular Q8H PRN Shilpa Trujillo MD   carbamide peroxide 5 drop Left Ear BID PRN Myles Patterson MD   cloZAPine 25 mg Oral BID Shilpa Trujillo MD   cloZAPine 50 mg Oral BID Shilpa Trujillo MD   EPINEPHrine PF 0 15 mg Intramuscular Once PRN Shilpa Trujillo MD   fluticasone-vilanterol 1 puff Inhalation Daily hSilpa Trujillo MD   ketotifen 1 drop Right Eye BID PRN Shilpa Trujillo MD   levothyroxine 125 mcg Oral Early Morning Shilpa Trujillo MD   magnesium hydroxide 30 mL Oral Daily PRN Shilpa Trujillo MD   montelukast 10 mg Oral HS Shilpa Trujillo MD   OLANZapine 5 mg Intramuscular Q8H PRN Shilpa Trujillo MD   OLANZapine 5 mg Oral Q8H PRN Shilpa Trujillo MD   ondansetron 4 mg Oral Q6H PRN Shilpa Trujillo MD   pantoprazole 40 mg Oral Early Morning Shilpa Trujillo MD   polyethylene glycol 17 g Oral Daily PRN Shilpa Trujillo MD   polyvinyl alcohol 1 drop Both Eyes Q3H PRN Shilpa Trujillo MD   sertraline 175 mg Oral Daily Shilpa Trujillo MD   sucralfate 1,000 mg Oral BID Angelica Nageotte, MD   theophylline 200 mg Oral Daily Shilpa Trujillo MD   tiotropium 18 mcg Inhalation Daily Lazarus Derry Beryl Matt MD   traZODone 25 mg Oral HS PRN Vincent Olivares MD           Plan:  1) Patient remains depressed and anxious at times, but does show periods of time where she is happier  She reports that she is currently afraid that she is going to die, but she does not have any thoughts of wanting to harm herself  Will continue to monitor patient on the unit  2) Continue all current medications as directed:    Clozaril 75 mg twice daily by mouth   Zoloft 175 mg once daily by mouth  3) Medical   All medical comorbidities are under the care [place] [id number] [facility]  4) Continue to work with Case Management to determine patient's disposition following discharge  Risks / Benefits of Treatment:    Risks, benefits, and possible side effects of medications explained to patient and patient verbalizes understanding and agreement for treatment  Counseling / Coordination of Care:    Patient's progress reviewed with nursing staff  Medications, treatment progress and treatment plan reviewed with patient      Deonte Horne PA-C 02/15/20

## 2020-02-14 NOTE — PROGRESS NOTES
02/14/20 0800   Team Meeting   Meeting Type Daily Rounds   Team Members Present   Team Members Present Physician;Nurse;; Other (Discipline and Name)   Physician Team Member Dr Partha Ivory, RN   Care Management Team Member Brenda Ruiz   Other (Discipline and Name) Samson Staples LCSW     Patient attended [facility]  Poor appetite yesterday (50/5/25)  Slept  For [employer]'s trip today for coffee

## 2020-02-14 NOTE — PLAN OF CARE
Problem: PAIN - ADULT  Goal: Verbalizes/displays adequate comfort level or baseline comfort level  Description  Interventions:  - Encourage patient to monitor pain and request assistance  - Assess pain using appropriate pain scale  - Administer analgesics based on type and severity of pain and evaluate response  - Implement non-pharmacological measures as appropriate and evaluate response  - Consider cultural and social influences on pain and pain management  - Notify physician/advanced practitioner if interventions unsuccessful or patient reports new pain  Outcome: Progressing     Problem: SAFETY ADULT  Goal: Patient will remain free of falls  Description  INTERVENTIONS:  - Assess patient frequently for physical needs  -  Identify cognitive and physical deficits and behaviors that affect risk of falls    -  [place] fall precautions as indicated by assessment   - Educate patient/family on patient safety including physical limitations  - Instruct patient to call for assistance with activity based on assessment  - Modify environment to reduce risk of injury  - Consider OT/PT consult to assist with strengthening/mobility  Outcome: Progressing     Problem: SLEEP DISTURBANCE  Goal: Will exhibit normal sleeping pattern  Description  Interventions:  -  Assess the patients sleep pattern, noting recent changes  - Administer medication as ordered  - Decrease environmental stimuli, including noise, as appropriate during the night  - Encourage the patient to actively participate in unit groups and or exercise during the day to enhance ability to achieve adequate sleep at night  - Assess the patient, in the morning, encouraging a description of sleep experience  Outcome: Progressing    Maggie maintained on ongoing fall and SAFE precaution  [place] in bed with eyes closed, breath even and unlabored   On O2 with humidifier @1L/m via nasal cannula   Q 15 minutes rounding   No somatic complaint overnight  No PRN needed for sleep aid   No indication of pain or discomfort   No respiratory distress   Will continue to monitor

## 2020-02-15 NOTE — PROGRESS NOTES
2145 Reyes Zendejas was awakened by staff for HS medicine & to join peers for [employer] party  She declined the pizza saying it is too hard to chew/fears choking on it  Did accept an yogurt & milk for HS snack  Declined Incentive Spirometer as "too tired" tonight  Came up for HS medicine except the Singulair  Is wearing now her QHS humidified nasal O2 @ 1L for bed

## 2020-02-15 NOTE — PROGRESS NOTES
Progress Note - Behavioral Health     Gigi Jenkins 60 y o  female MRN: [id number]   Unit/Bed#: [facility] 649-53 Encounter: [id number]    Per Nursing: Nursing reports that patient has isolated herself to her room because she states that she has "agoraphobia," which was recently exacerbated by a trip off the unit  She has reported anxiety  She leaves her room for meals, but does not remain social or interactive with peers  She is compliant with her medications  Per Patient: Patient states that she slept well last evening  She states that she is depressed  She states this with a smile and cheerful disposition, however, so her mood is incongruent to her affect  She also states that she has anxiety  She is looking forward to eating breakfast, but shortly after breakfast, she returns to her room  She does not voice any additional complaints  Patient denies any thoughts of wanting to harm self or others  Patient denies any recent self-injurious behaviors  Patient denies any active or passive suicidal ideation, intent or plan  Patient is able to contract for safety at the present time  Patient remains future-oriented and goal-directed, as well as motivated and help seeking  She does not prefer to go into any further detail about her depression and anxiety  Behavior over the last 24 hours: unchanged  Sleep: normal  Appetite: normal  Medication side effects: No   ROS: no complaints, all other systems are negative  Any positives in the Comprehensive Review of Systems were noted in the HPI  All other Review of Systems were negative          Mental Status Evaluation:    Appearance:  disheveled, looks older than stated age   Behavior:  pleasant, cooperative, guarded, evasive, smiles, friendly, isolates herself to her room   Speech:  soft   Mood:  depressed, anxious   Affect:  mood-incongruent, patient smiles and appears cheerful during majority of interaction    Thought Process:  goal directed, linear   Associations: intact associations   Thought Content:  no overt delusions, negative thinking, negative thoughts, ruminating thoughts   Perceptual Disturbances: denies auditory hallucinations when asked, does not appear responding to internal stimuli   Risk Potential: Suicidal ideation - None at present, contracts for safety on the unit  Homicidal ideation - None at present  Potential for aggression - Not at present   Sensorium:  oriented to person, place and time/date   Memory:  recent and remote memory grossly intact   Consciousness:  alert and awake   Attention: attention span and concentration appear shorter than expected for age   Insight:  limited   Judgment: limited   Gait/Station: normal gait/station   Motor Activity: no abnormal movements     Vital signs in last 24 hours:  Vitals:    02/16/20 0732   BP: 98/57   Pulse: 70   Resp:    Temp:    SpO2:          Laboratory results: I have personally reviewed all pertinent laboratory/tests results  Progress Toward Goals: progressing    Assessment/Plan   Principal Problem:    Schizoaffective disorder, bipolar type (HCC)  Active Problems:    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[facility] [address] )    Acquired hypothyroidism    Gastroesophageal reflux disease without esophagitis    At risk for aspiration    Recommended Treatment:     Planned medication and treatment changes:     All current active medications have been reviewed  Encourage group therapy, milieu therapy and occupational therapy  Behavioral Health checks every 7 minutes  Continue current medications:    Current Facility-Administered Medications:  acetaminophen 325 mg Oral Q6H PRN Amina Aparicio MD   acetaminophen 650 mg Oral Q6H PRN Amina Aparicio MD   acetaminophen 650 mg Oral Q8H PRN Amina Aparicio MD   albuterol 2 puff Inhalation Q4H PRN Amina Aparicio MD   aluminum-magnesium hydroxide-simethicone 15 mL Oral Q4H PRN Amina Aparicio MD   ammonium lactate 1 application Topical BID PRN Amina Aparicio MD   bacitracin 1 small application Topical Daily Shola Rayo ABILIO   benzonatate 100 mg Oral TID PRN Allie Guzman MD   benztropine 1 mg Intramuscular Q8H PRN Allie Guzman MD   carbamide peroxide 5 drop Left Ear BID PRN Jerica Victoria MD   cloZAPine 25 mg Oral BID Allie Guzman MD   cloZAPine 50 mg Oral BID Allie Guzman MD   EPINEPHrine PF 0 15 mg Intramuscular Once PRN Allie Guzman MD   fluticasone-vilanterol 1 puff Inhalation Daily Allie Guzman MD   ketotifen 1 drop Right Eye BID PRN Allie Guzman MD   levothyroxine 125 mcg Oral Early Morning Allie Guzman MD   magnesium hydroxide 30 mL Oral Daily PRN Allie Guzman MD   montelukast 10 mg Oral HS Allie Guzman MD   OLANZapine 5 mg Intramuscular Q8H PRN Allie Guzman MD   OLANZapine 5 mg Oral Q8H PRN Alile Guzman MD   ondansetron 4 mg Oral Q6H PRN Allie Guzman MD   pantoprazole 40 mg Oral Early Morning Allie Guzman MD   polyethylene glycol 17 g Oral Daily PRN Allie Guzman MD   polyvinyl alcohol 1 drop Both Eyes Q3H PRN Allie Guzman MD   sertraline 175 mg Oral Daily Allie Guzman MD   sucralfate 1,000 mg Oral BID Sadie Holland, MD   theophylline 200 mg Oral Daily Allie Guzman MD   tiotropium 18 mcg Inhalation Daily Allie Guzman MD   traZODone 25 mg Oral HS PRN Allie Guzman MD           Plan:  1) Patient continues to remain anxious and depressed, but does not provide collateral information to expand on these complaints  Despite reporting anxiety and depression, she is interacting with a smile and appears friendly and cheerful  She remains isolative to her room for the majority of the day, but does emerge for meals and medications  Will continue to monitor  2) Continue all current medications as directed:    Clozaril 75 mg twice daily by mouth   Zoloft 175 mg once daily by mouth  3) Medical   All medical comorbidities are under the care of [place] [id number] [facility] Service  4) Continue to work with Case Management to determine patient's disposition following discharge          Risks / Benefits of Treatment:    Risks, benefits, and possible side effects of medications explained to patient and patient verbalizes understanding and agreement for treatment  Counseling / Coordination of Care:    Patient's progress reviewed with nursing staff  Medications, treatment progress and treatment plan reviewed with patient      Will Covarrubias PA-C 02/16/20

## 2020-02-15 NOTE — PLAN OF CARE
Problem: Alteration in Thoughts and Perception  Goal: Agree to be compliant with medication regime, as prescribed and report medication side effects  Description  Interventions:  - Offer appropriate PRN medication and supervise ingestion; conduct aims, as needed   2/15/2020 1430 by Joi Vora RN  Outcome: Progressing  2/15/2020 1232 by Joi Vora RN  Outcome: Progressing  Goal: Attend and participate in unit activities, including therapeutic, recreational, and educational groups  Description  Interventions:  - Provide therapeutic and educational activities daily, encourage attendance and participation, and document same in the medical record     CERTIFIED PEER SPECIALIST INTERVENTIONS:    Complete peer assessment with patient to assess their needs and identify their goals to complete while in the recovery program as well as once discharged into the community  Patient will complete WRAP Plan, Crisis Plan and 5 Life Domains  Patient will attend 50% of groups offered on the unit  Patient will complete a goal card weekly  Outcome: Not Progressing  Goal: Complete daily ADLs, including personal hygiene independently, as able  Description  Interventions:  - Observe, teach, and assist patient with ADLS  - Monitor and promote a balance of rest/activity, with adequate nutrition and elimination   2/15/2020 1430 by Joi Vora RN  Outcome: Progressing  2/15/2020 1232 by Joi Vora RN  Outcome: Not Progressing    Problem: Depression  Goal: Refrain from isolation  Description  Interventions:  - Develop a trusting relationship   - Encourage socialization   Outcome: Jannette Lillian Gomez has been isolative to her room for the majority of the shift, only coming out for her medication and breakfast  Refused lunch this afternoon  She has voiced of complaints of fatigue and said this was the cause of her not attending groups, using her incentive spirometer and did not tend to her hygiene   Behaviors remain controlled at this time of documentation  Will continue to monitor

## 2020-02-15 NOTE — PLAN OF CARE
Problem: Alteration in Thoughts and Perception  Goal: Agree to be compliant with medication regime, as prescribed and report medication side effects  Description  Interventions:  - Offer appropriate PRN medication and supervise ingestion; conduct aims, as needed   Outcome: Progressing     Problem: Alteration in Thoughts and Perception  Goal: Attend and participate in unit activities, including therapeutic, recreational, and educational groups  Description  Interventions:  - Provide therapeutic and educational activities daily, encourage attendance and participation, and document same in the medical record     CERTIFIED PEER SPECIALIST INTERVENTIONS:    Complete peer assessment with patient to assess their needs and identify their goals to complete while in the recovery program as well as once discharged into the community  Patient will complete WRAP Plan, Crisis Plan and 5 Life Domains  Patient will attend 50% of groups offered on the unit  Patient will complete a goal card weekly  Outcome: Not Progressing     Problem: Depression  Goal: Refrain from isolation  Description  Interventions:  - Develop a trusting relationship   - Encourage socialization   Outcome: Not Progressing     [address] Niki Henderson is pleasant, but, continues to isolate in room & bed, asleep much of her free time  Bandaid dry & intact to R wrist  ID bracelet moved to L arm so it doesn't rub wound  She came out for meal, ate 5 packs of crackers, sherbet & Ensure; ruled the egg salad "not fresh" enough to eat  Came up for supper medicine   Asleep now; has not attended [employer]

## 2020-02-15 NOTE — PLAN OF CARE
Problem: PAIN - ADULT  Goal: Verbalizes/displays adequate comfort level or baseline comfort level  Description  Interventions:  - Encourage patient to monitor pain and request assistance  - Assess pain using appropriate pain scale  - Administer analgesics based on type and severity of pain and evaluate response  - Implement non-pharmacological measures as appropriate and evaluate response  - Consider cultural and social influences on pain and pain management  - Notify physician/advanced practitioner if interventions unsuccessful or patient reports new pain  Outcome: Progressing     Problem: SAFETY ADULT  Goal: Patient will remain free of falls  Description  INTERVENTIONS:  - Assess patient frequently for physical needs  -  Identify cognitive and physical deficits and behaviors that affect risk of falls    -  [place] fall precautions as indicated by assessment   - Educate patient/family on patient safety including physical limitations  - Instruct patient to call for assistance with activity based on assessment  - Modify environment to reduce risk of injury  - Consider OT/PT consult to assist with strengthening/mobility  Outcome: Progressing     Problem: RESPIRATORY - ADULT  Goal: Achieves optimal ventilation and oxygenation  Description  INTERVENTIONS:  - Assess for changes in respiratory status  - Assess for changes in mentation and behavior  - Position to facilitate oxygenation and minimize respiratory effort  - Oxygen administration by appropriate delivery method based on oxygen saturation (per order) or ABGs  - Initiate smoking cessation education as indicated  - Encourage broncho-pulmonary hygiene including cough, deep breathe, Incentive Spirometry  - Assess the need for suctioning and aspirate as needed  - Assess and instruct to report SOB or any respiratory difficulty  - Respiratory Therapy support as indicated  Outcome: Progressing     Problem: SLEEP DISTURBANCE  Goal: Will exhibit normal sleeping pattern  Description  Interventions:  -  Assess the patients sleep pattern, noting recent changes  - Administer medication as ordered  - Decrease environmental stimuli, including noise, as appropriate during the night  - Encourage the patient to actively participate in unit groups and or exercise during the day to enhance ability to achieve adequate sleep at night  - Assess the patient, in the morning, encouraging a description of sleep experience  Outcome: Progressing    Maggie maintained on ongoing fall and SAFE precaution   Laying in bed with eyes closed, breath even and unlabored   On O2 with humidifier @1L/m via nasal cannula   Q 15 minutes rounding   No somatic complaint overnight  No PRN needed for sleep aid   No indication of pain or discomfort   No respiratory distress   Will continue to monitor
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
## 2020-02-16 NOTE — PROGRESS NOTES
2145 Maggie did not attend PM Group  Was reluctant to use the Incentive Spirometer & did not perform as well as her usual (reached only 1000-1100ml volumes w/effort)  This pointed out to her; past few days her saying too tired to do it, in bed more  Was encouraged to be up & about again for her lung health, energy, raise blood pressure  Encouraged her to look @ ways she might better manage her "agoraphobia" next time so not so flattened, exhausted after an outing  She did have an HS snack of yogurt & a cupcake  Came to window for HS medicine except the Singulair  Wearing now her QHS humidified nasal O2 @ 1L for bed

## 2020-02-16 NOTE — PROGRESS NOTES
Sleeping with no distress noted, O2 on at 1L via [place], no resp distress noted  Fall and safe precautions in place and maintained   Monitoring continues
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
## 2020-02-16 NOTE — PLAN OF CARE
Problem: Alteration in Thoughts and Perception  Goal: Agree to be compliant with medication regime, as prescribed and report medication side effects  Description  Interventions:  - Offer appropriate PRN medication and supervise ingestion; conduct aims, as needed   2/16/2020 1301 by Nathan Chung, RN  Outcome: Progressing  2/16/2020 1217 by Nathan Chung, RN  Outcome: Progressing  Goal: Attend and participate in unit activities, including therapeutic, recreational, and educational groups  Description  Interventions:  - Provide therapeutic and educational activities daily, encourage attendance and participation, and document same in the medical record     CERTIFIED PEER SPECIALIST INTERVENTIONS:    Complete peer assessment with patient to assess their needs and identify their goals to complete while in the recovery program as well as once discharged into the community  Patient will complete WRAP Plan, Crisis Plan and 5 Life Domains  Patient will attend 50% of groups offered on the unit  Patient will complete a goal card weekly  Outcome: Not Progressing  Goal: Complete daily ADLs, including personal hygiene independently, as able  Description  Interventions:  - Observe, teach, and assist patient with ADLS  - Monitor and promote a balance of rest/activity, with adequate nutrition and elimination   Outcome: Not Progressing     Problem: Depression  Goal: Refrain from isolation  Description  Interventions:  - Develop a trusting relationship   - Encourage socialization   Outcome: Not Paulina Ciro has been isolative to her room in between scheduled medications and breakfast which she ate 25% of consisting of yogurt and oatmeal  Compliant with her morning medications  Did not attend groups, tend to her hygiene and she did not eat lunch  Denies anxiety or depression at this time, still verbalizing that she is fatigued  Blunted in affect, disheveled in appearance and withdrawn   Behaviors controlled, will continue to monitor

## 2020-02-17 NOTE — PLAN OF CARE
Problem: Alteration in Thoughts and Perception  Goal: Verbalize thoughts and feelings  Description  Interventions:  - Promote a nonjudgmental and trusting relationship with the patient through active listening and therapeutic communication  - Assess patient's level of functioning, behavior and potential for risk  - Engage patient in 1 on 1 interactions for a minimum of 15 minutes each session  - Encourage patient to express fears, feelings, frustrations, and discuss symptoms    - [place] patient to reality, help patient recognize reality-based thinking   - Administer medications as ordered and assess for potential side effects  - Provide the patient education related to the signs and symptoms of the illness and desired effects of prescribed medications  Outcome: Progressing  Goal: Agree to be compliant with medication regime, as prescribed and report medication side effects  Description  Interventions:  - Offer appropriate PRN medication and supervise ingestion; conduct aims, as needed   Outcome: Progressing  Goal: Complete daily ADLs, including personal hygiene independently, as able  Description  Interventions:  - Observe, teach, and assist patient with ADLS  - Monitor and promote a balance of rest/activity, with adequate nutrition and elimination   Outcome: Progressing     Problem: Depression  Goal: Refrain from isolation  Description  Interventions:  - Develop a trusting relationship   - Encourage socialization   Outcome: Progressing     1830 Leeann Alo is less isolative today, more motivated & energized  She is pleasant  Agreed to follow through w/showering & grooming w/help from [facility] for set up, w/back & hair care  Has been visible in phone chair in [place] when phone not in use  Made phone call to sister; hoping she will visit tonight  Wants to have a pass to sister's home & wishes brother/his  to come for a visit during the pass  Does not want to do another community pass w/staff   Says her agoraphobia is a lifetime issue, not something that can be fixed overnight  She did come up for supper medicine  Ate 50% of meal, egg salad, crackers, Ensure  Resting now in bed

## 2020-02-17 NOTE — PROGRESS NOTES
900 [facility] did not attend PM Group  She did perform Incentive Spirometry, did better than yesterday, volumes mostly 1250ml  She agrees she must make the effort to be active again  Agrees to goals tomorrow of [facility]  Did have an HS snack, came up for HS medicine except the Singulair  Wearing now her QHS humidified nasal O2 @ 1L for bed

## 2020-02-17 NOTE — PLAN OF CARE
Problem: Alteration in Thoughts and Perception  Goal: Verbalize thoughts and feelings  Description  Interventions:  - Promote a nonjudgmental and trusting relationship with the patient through active listening and therapeutic communication  - Assess patient's level of functioning, behavior and potential for risk  - Engage patient in 1 on 1 interactions for a minimum of 15 minutes each session  - Encourage patient to express fears, feelings, frustrations, and discuss symptoms    - [place] patient to reality, help patient recognize reality-based thinking   - Administer medications as ordered and assess for potential side effects  - Provide the patient education related to the signs and symptoms of the illness and desired effects of prescribed medications  Outcome: Progressing  Goal: Agree to be compliant with medication regime, as prescribed and report medication side effects  Description  Interventions:  - Offer appropriate PRN medication and supervise ingestion; conduct aims, as needed   Outcome: Progressing  Goal: Attend and participate in unit activities, including therapeutic, recreational, and educational groups  Description  Interventions:  - Provide therapeutic and educational activities daily, encourage attendance and participation, and document same in the medical record     CERTIFIED PEER SPECIALIST INTERVENTIONS:    Complete peer assessment with patient to assess their needs and identify their goals to complete while in the recovery program as well as once discharged into the community  Patient will complete WRAP Plan, Crisis Plan and 5 Life Domains  Patient will attend 50% of groups offered on the unit  Patient will complete a goal card weekly      Outcome: Progressing     Problem: Ineffective Coping  Goal: Participates in unit activities  Description  Interventions:  - Provide therapeutic environment   - Provide required programming   - Redirect inappropriate behaviors   Outcome: Progressing  Goal: Patient/Family verbalizes awareness of resources  Outcome: Progressing  Goal: Understands least restrictive measures  Description  Interventions:  - Utilize least restrictive behavior  Outcome: Progressing     Problem: Risk for Self Injury/Neglect  Goal: Treatment Goal: Remain safe during length of stay, learn and adopt new coping skills, and be free of self-injurious ideation, impulses and acts at the time of discharge  Outcome: Progressing  Goal: Verbalize thoughts and feelings  Description  Interventions:  - Assess and re-assess patient's lethality and potential for self-injury  - Engage patient in 1:1 interactions, daily, for a minimum of 15 minutes  - Encourage patient to express feelings, fears, frustrations, hopes  - Establish rapport/trust with patient   Outcome: Progressing  Goal: Refrain from harming self  Description  Interventions:  - Monitor patient closely, per order  - Develop a trusting relationship  - Supervise medication ingestion, monitor effects and side effects   Outcome: Progressing  Goal: Attend and participate in unit activities, including therapeutic, recreational, and educational groups  Description  Interventions:  - Provide therapeutic and educational activities daily, encourage attendance and participation, and document same in the medical record  - Obtain collateral information, encourage visitation and family involvement in care   Outcome: Progressing     Problem: Depression  Goal: Treatment Goal: Demonstrate behavioral control of depressive symptoms, verbalize feelings of improved mood/affect, and adopt new coping skills prior to discharge  Outcome: Progressing  Goal: Verbalize thoughts and feelings  Description  Interventions:  - Assess and re-assess patient's level of risk   - Engage patient in 1:1 interactions, daily, for a minimum of 15 minutes   - Encourage patient to express feelings, fears, frustrations, hopes   Outcome: Progressing  Goal: Refrain from isolation  Description  Interventions:  - Develop a trusting relationship   - Encourage socialization   Outcome: Progressing     Problem: Anxiety  Goal: Anxiety is at manageable level  Description  Interventions:  - Assess and monitor patient's anxiety level  - Monitor for signs and symptoms of anxiety both physical and emotional (heart palpitations, chest pain, shortness of breath, headaches, nausea, feeling jumpy, restlessness, irritable, apprehensive)  - Collaborate with interdisciplinary team and initiate plan and interventions as ordered    - [place] patient to unit/surroundings  - Explain treatment plan  - Encourage participation in care  - Encourage verbalization of concerns/fears  - Identify coping mechanisms  - Assist in developing anxiety-reducing skills  - Administer/offer alternative therapies  - Limit or eliminate stimulants  Outcome: Progressing     Problem: Alteration in Orientation  Goal: Interact with staff daily  Description  Interventions:  - Assess and re-assess patient's level of orientation  - Engage patient in 1 on 1 interactions, daily, for a minimum of 15 minutes   - Establish rapport/trust with patient   Outcome: Progressing     Problem: PAIN - ADULT  Goal: Verbalizes/displays adequate comfort level or baseline comfort level  Description  Interventions:  - Encourage patient to monitor pain and request assistance  - Assess pain using appropriate pain scale  - Administer analgesics based on type and severity of pain and evaluate response  - Implement non-pharmacological measures as appropriate and evaluate response  - Consider cultural and social influences on pain and pain management  - Notify physician/advanced practitioner if interventions unsuccessful or patient reports new pain  Outcome: Progressing     Problem: SAFETY ADULT  Goal: Patient will remain free of falls  Description  INTERVENTIONS:  - Assess patient frequently for physical needs  -  Identify cognitive and physical deficits and behaviors that affect risk of falls  -  [place] fall precautions as indicated by assessment   - Educate patient/family on patient safety including physical limitations  - Instruct patient to call for assistance with activity based on assessment  - Modify environment to reduce risk of injury  - Consider OT/PT consult to assist with strengthening/mobility  Outcome: Progressing     Problem: Nutrition/Hydration-ADULT  Goal: Nutrient/Hydration intake appropriate for improving, restoring or maintaining nutritional needs  Description  Monitor and assess patient's nutrition/hydration status for malnutrition  Collaborate with interdisciplinary team and initiate plan and interventions as ordered  Monitor patient's weight and dietary intake as ordered or per policy  Utilize nutrition screening tool and intervene as necessary  Determine patient's food preferences and provide high-protein, high-caloric foods as appropriate       INTERVENTIONS:  - Monitor oral intake, urinary output, labs, and treatment plans  - Assess nutrition and hydration status and recommend course of action  - Evaluate amount of meals eaten  - Assist patient with eating if necessary   - Allow adequate time for meals  - Recommend/ encourage appropriate diets, oral nutritional supplements, and vitamin/mineral supplements  - Order, calculate, and assess calorie counts as needed  - Recommend, monitor, and adjust tube feedings and TPN/PPN based on assessed needs  - Assess need for intravenous fluids  - Provide specific nutrition/hydration education as appropriate  - Include patient/family/caregiver in decisions related to nutrition  Outcome: Progressing     Problem: Alteration in Thoughts and Perception  Goal: Treatment Goal: Gain control of psychotic behaviors/thinking, reduce/eliminate presenting symptoms and demonstrate improved reality functioning upon discharge  Outcome: Not Progressing  Goal: Recognize dysfunctional thoughts, communicate reality-based thoughts at the time of discharge  Description  Interventions:  - Provide medication and psycho-education to assist patient in compliance and developing insight into his/her illness   Outcome: Not Progressing  Goal: Complete daily ADLs, including personal hygiene independently, as able  Description  Interventions:  - Observe, teach, and assist patient with ADLS  - Monitor and promote a balance of rest/activity, with adequate nutrition and elimination   Outcome: Not Progressing     Problem: Risk for Self Injury/Neglect  Goal: Recognize maladaptive responses and adopt new coping mechanisms  Outcome: Not Progressing  Goal: Complete daily ADLs, including personal hygiene independently, as able  Description  Interventions:  - Observe, teach, and assist patient with ADLS  - Monitor and promote a balance of rest/activity, with adequate nutrition and elimination  Outcome: Not Progressing     Problem: Depression  Goal: Refrain from self-neglect  Outcome: Not Progressing   Mostly isolative to her bed, minimally interacted with others, attended IMR group  Did not shower or do hygiene and looks disheveled  Ate 25% of breakfast and 10% of lunch  Med compliant  No issues noted  No somatic complaints voiced  Continue to monitor

## 2020-02-17 NOTE — ASSESSMENT & PLAN NOTE
Patient did go on a pass with staff lats Friday which reportedly not go very well, accusing the  of being mean for not stopping to buy cigarettes, was claiming she was going to float away and refused to move from her seat claiming to be agoraphobic in urban settings, has somatic sxs of fear of dying, and needs contd  reminders to do showers  Remains poorly groomed and disheveled today but knows she needs to still comply with taking showers twice a week, and attend to ADLs  Friendly but still accusatory, preoccupied, anxious, stays to self needing frequent support and reassurance  No overt delusions elicited today  Compliant with meds, attends most of the groups  Eats ok but complains of fear of choking and and picks on her food, sleeps Ok, no overt delsions elicited now        Current medications:    Current Facility-Administered Medications:     acetaminophen (TYLENOL) tablet 325 mg, 325 mg, Oral, Q6H PRN, Chichi Lockett MD    acetaminophen (TYLENOL) tablet 650 mg, 650 mg, Oral, Q6H PRN, Chichi Lockett MD    acetaminophen (TYLENOL) tablet 650 mg, 650 mg, Oral, Q8H PRN, Chichi Lockett MD    albuterol (PROVENTIL HFA,VENTOLIN HFA) inhaler 2 puff, 2 puff, Inhalation, Q4H PRN, Chichi Lockett MD, 2 puff at 10/11/19 0424    aluminum-magnesium hydroxide-simethicone (MYLANTA) 200-200-20 mg/5 mL oral suspension 15 mL, 15 mL, Oral, Q4H PRN, Chichi Lockett MD, 15 mL at 01/26/20 1021    ammonium lactate (LAC-HYDRIN) 12 % lotion 1 application, 1 application, Topical, BID PRN, Chichi Lockett MD    bacitracin topical ointment 1 small application, 1 small application, Topical, Daily, ABILIO Man, 1 small application at 50/71/44 6456    benzonatate (TESSALON PERLES) capsule 100 mg, 100 mg, Oral, TID PRN, Chichi Lockett MD    benztropine (COGENTIN) injection 1 mg, 1 mg, Intramuscular, Q8H PRN, Chichi Lockett MD    carbamide peroxide (DEBROX) 6 5 % otic solution 5 drop, 5 drop, Left Ear, BID PRN, Lara Draper MD    cloZAPine (CLOZARIL) tablet 25 mg, 25 mg, Oral, BID, Shilpa Trujillo MD, 25 mg at 02/16/20 2142    cloZAPine (CLOZARIL) tablet 50 mg, 50 mg, Oral, BID, Shilpa Trujillo MD, 50 mg at 02/16/20 2142    EPINEPHrine PF (ADRENALIN) 1 mg/mL injection 0 15 mg, 0 15 mg, Intramuscular, Once PRN, Shilpa Trujillo MD    fluticasone-vilanterol (BREO ELLIPTA) 200-25 MCG/INH inhaler 1 puff, 1 puff, Inhalation, Daily, Shilpa Trujillo MD, 1 puff at 02/17/20 0843    ketotifen (ZADITOR) 0 025 % ophthalmic solution 1 drop, 1 drop, Right Eye, BID PRN, Shilpa Trujillo MD    levothyroxine tablet 125 mcg, 125 mcg, Oral, Early Morning, Shilpa Trujillo MD, 125 mcg at 02/17/20 0602    magnesium hydroxide (MILK OF MAGNESIA) 400 mg/5 mL oral suspension 30 mL, 30 mL, Oral, Daily PRN, Shilpa Turjillo MD    montelukast (SINGULAIR) tablet 10 mg, 10 mg, Oral, HS, Shilpa Trujillo MD, 10 mg at 01/16/20 2106    OLANZapine (ZyPREXA) IM injection 5 mg, 5 mg, Intramuscular, Q8H PRN, Shilpa Trujillo MD    OLANZapine (ZyPREXA) tablet 5 mg, 5 mg, Oral, Q8H PRN, Shilpa Trujillo MD    ondansetron (ZOFRAN-ODT) dispersible tablet 4 mg, 4 mg, Oral, Q6H PRN, Shilpa Trujillo MD, 4 mg at 12/09/19 1757    pantoprazole (PROTONIX) EC tablet 40 mg, 40 mg, Oral, Early Morning, Shilpa Trujillo MD, 40 mg at 02/17/20 0602    polyethylene glycol (MIRALAX) packet 17 g, 17 g, Oral, Daily PRN, Shilpa Trujillo MD    polyvinyl alcohol (LIQUIFILM TEARS) 1 4 % ophthalmic solution 1 drop, 1 drop, Both Eyes, Q3H PRN, Shilpa Trujillo MD    sertraline (ZOLOFT) tablet 175 mg, 175 mg, Oral, Daily, Shilpa Trujillo MD, 175 mg at 02/17/20 0844    sucralfate (CARAFATE) oral suspension 1,000 mg, 1,000 mg, Oral, BID, Cat Torres MD, 1,000 mg at 02/17/20 0602    theophylline (JEF-24) 24 hr capsule 200 mg, 200 mg, Oral, Daily, Shilpa Trujillo MD, 200 mg at 02/17/20 0844    tiotropium (SPIRIVA) capsule for inhaler 18 mcg, 18 mcg, Inhalation, Daily, Shilpa Trujillo MD, 18 mcg at 02/17/20 0844    traZODone (DESYREL) tablet 25 mg, 25 mg, Oral, HS VENKATN, Faheem Daniels MD  Justification if on more than two antipsychotics: not applicable  Side effects if any: none    Risks , benefits, side effects and precautions of medications discussed with patient and reminded patient to let us know any problems with medications     Objective:     Vital Signs:  Vitals:    02/16/20 1608 02/16/20 1900 02/17/20 0730 02/17/20 0732   BP: (!) 85/61 (!) 82/49 109/67 100/55   BP Location: Left arm Left arm Left arm Left arm   Pulse: 87 86 84 65   Resp: 18 18 16    Temp: 97 9 °F (36 6 °C) 97 7 °F (36 5 °C) 97 6 °F (36 4 °C)    TempSrc: Temporal Temporal Temporal    SpO2: 93% 92%     Weight:       Height:         Appearance:  age appropriate and older than stated age  but somewhat disheveled with uncombed hair   Behavior:  deviant, evasive and guarded argumentative suspicious   Speech:  delayed, increased latency of response and tangential    Mood:  anxious, euphoric and irritable    Affect:  increased in intensity, increased in range, mood-congruent and redirectable   Thought Process:  circumstantial, concrete, goal directed, illogical and perserverative with speech tending to become stilted   Thought Content:  delusions  grandiose and persecutory no current suicidal homicidal thoughts or plans reported  No phobias obsessions compulsions or distorted body perception use  Still with psychosomatic symptoms of having difficulty to breathe and fear of choking    No overt delusions elicited otherwise     Perceptual Disturbances: None    Risk Potential: Inability to care for herself and refusal to take showers regularly   Sensorium:  person, place, time/date, situation, day of week, month of year, year and time   Cognition:  grossly intact no language deficit, no deficit in recent or remote memory, aware of current events   Consciousness:  alert and awake    Attention: Fair   Intellect: Average   Insight:   limited   Judgment: fair       Motor Activity: no abnormal movements Recent Labs:  Results Reviewed     None          I/O Past 24 hours:  No intake/output data recorded  No intake/output data recorded  Assessment / Plan:     Schizoaffective disorder, bipolar type ([address] )  Overall status:  I improving    Reason for continued inpatient care: to assess ability to maintain improvement by attending to ADLs and taking showers regular and see how she does on outing with family after another outing with staff escort next week  Acceptance by patient: accepting now  Hopefulness in recovery: living at the [facility] soon  Understanding of medications :  yes  Involved in reintegration process: attending groups and has off grounds and off unit privileges   Trusting in relatoinship with psychiatrist:  Sheri Randall now    Recommended Treatment:    Medication changes:  1) none today    Non-pharmacological treatments  1) Continue with individual therapy, group therapy, milieu therapy and occupational therapy using recovery principles and psycho-education about accepting illness and need for treatment   2) encourage to take showers twice a week and cooperate with treatment and adl skills as per her behav  plan  3) reschedule another pass with staff to community again before pass with sister to see if she would fully comply with assigned outing   Safety  1) Safety/communication plan established targeting dynamic risk factors above  Discharge Plan to a [facility] at [address] / Coordination of Care    Total floor / unit time spent today 15 minutes  Greater than 50% of total time was spent with the patient and / or family counseling and / or coordination of care  Receptive to listening and learning coping skills for management of symptoms and attending to ADLs  Patient's Rights, confidentiality and exceptions to confidentiality, use of automated medical record, [facility] staff access to medical record, and consent to treatment reviewed      Carissa Willis MD

## 2020-02-17 NOTE — PROGRESS NOTES
02/14/20 1100   Activity/Group Checklist   Group Other (Comment)  ([facility] - Weekly Goal Review/SMART goals)   Attendance Did not attend     Patient was excused from [facility][1] - Weekly Goal Review/SMART goals, as she was on an off-grounds with staff pass

## 2020-02-17 NOTE — PROGRESS NOTES
Pharmacy Clozapine Monitoring Progress Note      Chad Meyer is receiving a total daily dose of 150 mg of clozapine divided as 75mg at 1600 and 75mg at 2000  Pharmacist Recommendations Based on Assessment and Plan (below):      1  Patient is Clozapine-REMS eligible, ANC was updated, with every 2 week monitoring frequency and the next [address] is due on 2/28/20  Assessment/Plan:    Phase of Treatment:     Current phase of treatment is initation/titration  Patient Clozapine REMS Eligibility:     Patient is eligible to receive clozapine and the [address] monitoring frequency is every two weeks per clozapine REMS  The patient's latest [address] value has been updated into the Clozapine-REMS program          ANC and Clozapine Level (if available)     The most recent [address] was   Lab Results   Component Value Date    NEUTROABS 3 30 02/15/2020    and the next lab is due on 2/28/20  Last Clozapine level (if available):    0   Lab Value Date/Time    CLOZAPINE 205 (L) 01/03/2020 0547     0   Lab Value Date/Time    NCLOZIP 119 01/03/2020 0547           Monitoring Parameters for Clozapine:     Clozapine Adverse Effect Suggested Monitoring Patient's Current Status    Agranulocytosis ANC baseline and then repeat weekly for first 6 months and every 2 weeks for the second 6 months  Maintenance after one year of therapy every month  The most recent [address] was   Lab Results   Component Value Date    NEUTROABS 3 30 02/15/2020       This ANC is considered normal range (ANC >/= 1500/mcL)  Continue current treatment and monitoring course  Respiratory Depression Please ensure patient is not on concomitant respiratory lowering medications such as benzodiazepines, sedative hypnotics (eg  zolpidem) This patient is not on respiratory depression lowering medications   Myocarditis Baseline and weekly EKG, CRP, BNP, and troponin    Weekly symptomatic complaints of fatigue, dyspnea, tachycardia, chest pain, palpitations, and fever for the first 4 weeks  Last EKG Results on  1/10/20 showed: "Normal sinus rhythm  Left axis deviation  Inferior infarct (cited on or before 10-NOLA-2020)  Abnormal ECG  When compared with ECG of 11-DEC-2019 20:41,  No significant change was found  Confirmed by Erich Noble ([id number]) on 1/13/2020 9:02:19 AM"      Last QTC value was:  0   Lab Value Date/Time    QTCINT 457 01/10/2020 2031       Last BNP was: No results found for: NTBNP    Last Troponin was:  0   Lab Value Date/Time    TROPONINI <0 01 05/01/2019 1318    TROPONINI <0 04 05/10/2015 1948        Orthostatic hypotension/  bradycardia Blood pressure and vital signs      Monitor blood pressure and orthostatic hypotension at least twice daily upon initiation and continue to follow at least once daily afterwards  Patient's last recorded vital signs:       /55 (BP Location: Left arm)   Pulse 65   Temp 97 6 °F (36 4 °C) (Temporal)   Resp 16   Ht 5' 4" (1 626 m)   Wt 66 5 kg (146 lb 9 6 oz)   SpO2 92%   BMI 25 16 kg/m²             Constipation (bowel obstruction due to high anticholinergic clozapine burden) Assess at baseline and weekly during first four months of therapy  Docusate 100mg BID and Miralax 17 grams daily recommended initially  Sialorrhea Assess at baseline and weekly during first four months of therapy  May be managed using sublingual anticholinergic preparations (atropine 1% eye drops)  If needed atropine 1% eye drops can be used sublingually or glycopyrrolate or terazosin if patient non-allergic  Please note that per current REMS protocol the provider can submit a treatment rationale that justifies clozapine treatment even if patient parameters are outside of REMS recommendations  The Clozapine REMS is an FDA-mandated program implemented by the manufacturers of clozapine  ([employer]  com/CpmgClozapineUI/home  u)  Pharmacy will continue to follow patient with team, thank you    Electronically signed by: Jayson Acosta, PharmD, [facility], Clinical Pharmacist - Psychiatry

## 2020-02-17 NOTE — PROGRESS NOTES
Progress Note - Annamarie Eng 1955, 60 y o  female MRN: [id number]    [facility] Encounter: [id number]    Primary Care Provider: Naila Khalil PA-C   Date and time admitted to hospital: 7/23/2019  5:30 PM        * Schizoaffective disorder, bipolar type [facility])  Assessment & Plan  Patient did go on a pass with staff lats Friday which reportedly not go very well, accusing the  of being mean for not stopping to buy cigarettes, was claiming she was going to float away and refused to move from her seat claiming to be agoraphobic in urban settings, has somatic sxs of fear of dying, and needs contd  reminders to do showers  Remains poorly groomed and disheveled today but knows she needs to still comply with taking showers twice a week, and attend to ADLs  Friendly but still accusatory, preoccupied, anxious, stays to self needing frequent support and reassurance  No overt delusions elicited today  Compliant with meds, attends most of the groups  Eats ok but complains of fear of choking and and picks on her food, sleeps Ok, no overt delsions elicited now        Current medications:    Current Facility-Administered Medications:     acetaminophen (TYLENOL) tablet 325 mg, 325 mg, Oral, Q6H PRN, Shi Pham MD    acetaminophen (TYLENOL) tablet 650 mg, 650 mg, Oral, Q6H PRN, Shi Pham MD    acetaminophen (TYLENOL) tablet 650 mg, 650 mg, Oral, Q8H PRN, Shi Pham MD    albuterol (PROVENTIL HFA,VENTOLIN HFA) inhaler 2 puff, 2 puff, Inhalation, Q4H PRN, Shi Pham MD, 2 puff at 10/11/19 0424    aluminum-magnesium hydroxide-simethicone (MYLANTA) 200-200-20 mg/5 mL oral suspension 15 mL, 15 mL, Oral, Q4H PRN, Shi Pham MD, 15 mL at 01/26/20 1021    ammonium lactate (LAC-HYDRIN) 12 % lotion 1 application, 1 application, Topical, BID PRN, Shi Pham MD    bacitracin topical ointment 1 small application, 1 small application, Topical, Daily, ABILIO Holguin, 1 small application at 02/16/20 0990    benzonatate (TESSALON PERLES) capsule 100 mg, 100 mg, Oral, TID PRN, Sarah Hanna MD    benztropine (COGENTIN) injection 1 mg, 1 mg, Intramuscular, Q8H PRN, Sarah Hanna MD    carbamide peroxide (DEBROX) 6 5 % otic solution 5 drop, 5 drop, Left Ear, BID PRN, Hodan Montemayor MD    cloZAPine (CLOZARIL) tablet 25 mg, 25 mg, Oral, BID, Sarah Hanna MD, 25 mg at 02/16/20 2142    cloZAPine (CLOZARIL) tablet 50 mg, 50 mg, Oral, BID, Sarah Hanna MD, 50 mg at 02/16/20 2142    EPINEPHrine PF (ADRENALIN) 1 mg/mL injection 0 15 mg, 0 15 mg, Intramuscular, Once PRN, Sarah Hanna MD    fluticasone-vilanterol (BREO ELLIPTA) 200-25 MCG/INH inhaler 1 puff, 1 puff, Inhalation, Daily, Sarah Hanna MD, 1 puff at 02/17/20 0843    ketotifen (ZADITOR) 0 025 % ophthalmic solution 1 drop, 1 drop, Right Eye, BID PRN, Sarah Hanna MD    levothyroxine tablet 125 mcg, 125 mcg, Oral, Early Morning, Sarah Hanna MD, 125 mcg at 02/17/20 0602    magnesium hydroxide (MILK OF MAGNESIA) 400 mg/5 mL oral suspension 30 mL, 30 mL, Oral, Daily PRN, Sarah Hanna MD    montelukast (SINGULAIR) tablet 10 mg, 10 mg, Oral, HS, Sarah Hanna MD, 10 mg at 01/16/20 2106    OLANZapine (ZyPREXA) IM injection 5 mg, 5 mg, Intramuscular, Q8H PRN, Sarah Hanna MD    OLANZapine (ZyPREXA) tablet 5 mg, 5 mg, Oral, Q8H PRN, Sarah Hanna MD    ondansetron (ZOFRAN-ODT) dispersible tablet 4 mg, 4 mg, Oral, Q6H PRN, Sarah Hanna MD, 4 mg at 12/09/19 1757    pantoprazole (PROTONIX) EC tablet 40 mg, 40 mg, Oral, Early Morning, Sarah Hanna MD, 40 mg at 02/17/20 0602    polyethylene glycol (MIRALAX) packet 17 g, 17 g, Oral, Daily PRN, Sarah Hanna MD    polyvinyl alcohol (LIQUIFILM TEARS) 1 4 % ophthalmic solution 1 drop, 1 drop, Both Eyes, Q3H PRN, Sarah Hanna MD    sertraline (ZOLOFT) tablet 175 mg, 175 mg, Oral, Daily, Sarah Hanna MD, 175 mg at 02/17/20 0844    sucralfate (CARAFATE) oral suspension 1,000 mg, 1,000 mg, Oral, BID, Junaid Perez MD Melissa, 1,000 mg at 02/17/20 0602    theophylline (JEF-24) 24 hr capsule 200 mg, 200 mg, Oral, Daily, Ivonne Nevarez MD, 200 mg at 02/17/20 0844    tiotropium (SPIRIVA) capsule for inhaler 18 mcg, 18 mcg, Inhalation, Daily, Ivonne Nevarez MD, 18 mcg at 02/17/20 0844    traZODone (DESYREL) tablet 25 mg, 25 mg, Oral, HS PRN, Ivonne Nevarez MD  Justification if on more than two antipsychotics: not applicable  Side effects if any: none    Risks , benefits, side effects and precautions of medications discussed with patient and reminded patient to let us know any problems with medications     Objective:     Vital Signs:  Vitals:    02/16/20 1608 02/16/20 1900 02/17/20 0730 02/17/20 0732   BP: (!) 85/61 (!) 82/49 109/67 100/55   BP Location: Left arm Left arm Left arm Left arm   Pulse: 87 86 84 65   Resp: 18 18 16    Temp: 97 9 °F (36 6 °C) 97 7 °F (36 5 °C) 97 6 °F (36 4 °C)    TempSrc: Temporal Temporal Temporal    SpO2: 93% 92%     Weight:       Height:         Appearance:  age appropriate and older than stated age  but somewhat disheveled with uncombed hair   Behavior:  deviant, evasive and guarded argumentative suspicious   Speech:  delayed, increased latency of response and tangential    Mood:  anxious, euphoric and irritable    Affect:  increased in intensity, increased in range, mood-congruent and redirectable   Thought Process:  circumstantial, concrete, goal directed, illogical and perserverative with speech tending to become stilted   Thought Content:  delusions  grandiose and persecutory no current suicidal homicidal thoughts or plans reported  No phobias obsessions compulsions or distorted body perception use  Still with psychosomatic symptoms of having difficulty to breathe and fear of choking    No overt delusions elicited otherwise     Perceptual Disturbances: None    Risk Potential: Inability to care for herself and refusal to take showers regularly   Sensorium:  person, place, time/date, situation, day of week, month of year, year and time   Cognition:  grossly intact no language deficit, no deficit in recent or remote memory, aware of current events   Consciousness:  alert and awake    Attention: Fair   Intellect: Average   Insight:   limited   Judgment: fair       Motor Activity: no abnormal movements         Recent Labs:  Results Reviewed     None          I/O Past 24 hours:  No intake/output data recorded  No intake/output data recorded  Assessment / Plan:     Schizoaffective disorder, bipolar type ([address] )  Overall status:  I improving    Reason for continued inpatient care: to assess ability to maintain improvement by attending to ADLs and taking showers regular and see how she does on outing with family after another outing with staff escort next week  Acceptance by patient: accepting now  Hopefulness in recovery: living at the [facility] soon  Understanding of medications :  yes  Involved in reintegration process: attending groups and has off grounds and off unit privileges   Trusting in relatoinship with psychiatrist:  Remy Carlos now    Recommended Treatment:    Medication changes:  1) none today    Non-pharmacological treatments  1) Continue with individual therapy, group therapy, milieu therapy and occupational therapy using recovery principles and psycho-education about accepting illness and need for treatment   2) encourage to take showers twice a week and cooperate with treatment and adl skills as per her behav  plan  3) reschedule another pass with staff to community again before pass with sister to see if she would fully comply with assigned outing   Safety  1) Safety/communication plan established targeting dynamic risk factors above  Discharge Plan to a [facility] at [address] / Coordination of Care    Total floor / unit time spent today 15 minutes  Greater than 50% of total time was spent with the patient and / or family counseling and / or coordination of care    Receptive to listening and learning coping skills for management of symptoms and attending to ADLs  Patient's Rights, confidentiality and exceptions to confidentiality, use of automated medical record, Jeb Patrick staff access to medical record, and consent to treatment reviewed      Meldon Curling, MD

## 2020-02-17 NOTE — PROGRESS NOTES
02/17/20 0900   Team Meeting   Meeting Type Daily Rounds   Team Members Present   Team Members Present Physician;Nurse; Other (Discipline and Name)   Physician Team Member Dr Hi Rubio Team Member Keyona Garcia RN   Other (Discipline and Name) Moisés Greene LCSW     Patient is reporting that she needs "professional help" due to the "trauma" she alleges took place while out on her pass  Patient verbalized to several staff her negative opinions about the unit  who took her on a pass last week  After looking into this issue, staff learned that patient was upset with  for not taking her somewhere off-route  Patient reported positive interactions with [facility] who accompanied her, and prevented her from "floating away "    She reports she is "exhausted" today due to her agoraphobia

## 2020-02-17 NOTE — TREATMENT TEAM
02/17/20 1100   Activity/Group Checklist   Group Other (Comment)  ([facility])   Attendance Attended   Attendance Duration (min) 46-60   Interactions Interacted appropriately   Affect/Mood Appropriate   Goals Achieved Identified feelings; Identified triggers; Discussed coping strategies; Able to recieve feedback; Able to self-disclose; Able to give feedback to another     Patient was able to attend group today  Group focused on healing from psychological and emotional wounds  Patient was able to have a discussion about the different kinds of emotional/psychological wounds and ways to recover from them  Patient discussed different coping strategies and self-care practices as way to heal from past wounds  Patient was able to recognize warning signs and triggers as way to reach out for help  Patient did well with participation and being respectful of peers

## 2020-02-17 NOTE — PROGRESS NOTES
Sleeping with no distress noted  O2 on 1L via NC  Safe and fall precautions in place and maintained   Monitoring continues

## 2020-02-18 NOTE — PLAN OF CARE
Problem: Alteration in Thoughts and Perception  Goal: Verbalize thoughts and feelings  Description  Interventions:  - Promote a nonjudgmental and trusting relationship with the patient through active listening and therapeutic communication  - Assess patient's level of functioning, behavior and potential for risk  - Engage patient in 1 on 1 interactions for a minimum of 15 minutes each session  - Encourage patient to express fears, feelings, frustrations, and discuss symptoms    - [place] patient to reality, help patient recognize reality-based thinking   - Administer medications as ordered and assess for potential side effects  - Provide the patient education related to the signs and symptoms of the illness and desired effects of prescribed medications  Outcome: Progressing  Goal: Agree to be compliant with medication regime, as prescribed and report medication side effects  Description  Interventions:  - Offer appropriate PRN medication and supervise ingestion; conduct aims, as needed   Outcome: Progressing  Goal: Attend and participate in unit activities, including therapeutic, recreational, and educational groups  Description  Interventions:  - Provide therapeutic and educational activities daily, encourage attendance and participation, and document same in the medical record     CERTIFIED PEER SPECIALIST INTERVENTIONS:    Complete peer assessment with patient to assess their needs and identify their goals to complete while in the recovery program as well as once discharged into the community  Patient will complete WRAP Plan, Crisis Plan and 5 Life Domains  Patient will attend 50% of groups offered on the unit  Patient will complete a goal card weekly      Outcome: Progressing  Goal: Complete daily ADLs, including personal hygiene independently, as able  Description  Interventions:  - Observe, teach, and assist patient with ADLS  - Monitor and promote a balance of rest/activity, with adequate nutrition and elimination   Outcome: Progressing     Problem: Alteration in Orientation  Goal: Interact with staff daily  Description  Interventions:  - Assess and re-assess patient's level of orientation  - Engage patient in 1 on 1 interactions, daily, for a minimum of 15 minutes   - Establish rapport/trust with patient   Outcome: Progressing     Problem: SAFETY ADULT  Goal: Patient will remain free of falls  Description  INTERVENTIONS:  - Assess patient frequently for physical needs  -  Identify cognitive and physical deficits and behaviors that affect risk of falls  -  [place] fall precautions as indicated by assessment   - Educate patient/family on patient safety including physical limitations  - Instruct patient to call for assistance with activity based on assessment  - Modify environment to reduce risk of injury  - Consider OT/PT consult to assist with strengthening/mobility  Outcome: Progressing     Problem: RESPIRATORY - ADULT  Goal: Achieves optimal ventilation and oxygenation  Description  INTERVENTIONS:  - Assess for changes in respiratory status  - Assess for changes in mentation and behavior  - Position to facilitate oxygenation and minimize respiratory effort  - Oxygen administration by appropriate delivery method based on oxygen saturation (per order) or ABGs  - Initiate smoking cessation education as indicated  - Encourage broncho-pulmonary hygiene including cough, deep breathe, Incentive Spirometry  - Assess the need for suctioning and aspirate as needed  - Assess and instruct to report SOB or any respiratory difficulty  - Respiratory Therapy support as indicated  Outcome: Evangelist Woodward was sleeping at the beginning of the shift  Prompted to come for medications  Swallow pills without difficulty  Ate 50% of her meal and drank Ensure  She does admit to depression  Pleasant and cooperative upon approach  Able to make needs known  No BM or shower this shift  Attended evening group   Took HS medication  Ate snack  Oxygen saturation 91% on room air prior to oxygen 1 liter nasal cannula being applied  Continue to monitor  Precautions maintained

## 2020-02-18 NOTE — PLAN OF CARE
Problem: PAIN - ADULT  Goal: Verbalizes/displays adequate comfort level or baseline comfort level  Description  Interventions:  - Encourage patient to monitor pain and request assistance  - Assess pain using appropriate pain scale  - Administer analgesics based on type and severity of pain and evaluate response  - Implement non-pharmacological measures as appropriate and evaluate response  - Consider cultural and social influences on pain and pain management  - Notify physician/advanced practitioner if interventions unsuccessful or patient reports new pain  Outcome: Progressing     Problem: SAFETY ADULT  Goal: Patient will remain free of falls  Description  INTERVENTIONS:  - Assess patient frequently for physical needs  -  Identify cognitive and physical deficits and behaviors that affect risk of falls    -  [place] fall precautions as indicated by assessment   - Educate patient/family on patient safety including physical limitations  - Instruct patient to call for assistance with activity based on assessment  - Modify environment to reduce risk of injury  - Consider OT/PT consult to assist with strengthening/mobility  Outcome: Progressing     Problem: RESPIRATORY - ADULT  Goal: Achieves optimal ventilation and oxygenation  Description  INTERVENTIONS:  - Assess for changes in respiratory status  - Assess for changes in mentation and behavior  - Position to facilitate oxygenation and minimize respiratory effort  - Oxygen administration by appropriate delivery method based on oxygen saturation (per order) or ABGs  - Initiate smoking cessation education as indicated  - Encourage broncho-pulmonary hygiene including cough, deep breathe, Incentive Spirometry  - Assess the need for suctioning and aspirate as needed  - Assess and instruct to report SOB or any respiratory difficulty  - Respiratory Therapy support as indicated  Outcome: Progressing     Problem: SLEEP DISTURBANCE  Goal: Will exhibit normal sleeping pattern  Description  Interventions:  -  Assess the patients sleep pattern, noting recent changes  - Administer medication as ordered  - Decrease environmental stimuli, including noise, as appropriate during the night  - Encourage the patient to actively participate in unit groups and or exercise during the day to enhance ability to achieve adequate sleep at night  - Assess the patient, in the morning, encouraging a description of sleep experience  Outcome: Progressing    Maggie maintained on ongoing fall and SAFE precaution   Laying in bed with eyes closed, breath even and unlabored   On O2 with humidifier @1L/m via nasal cannula   Q 15 minutes rounding   No somatic complaint overnight  No PRN needed for sleep aid   No indication of pain or discomfort   No respiratory distress   Will continue to monitor

## 2020-02-18 NOTE — TREATMENT TEAM
Patient refused      02/18/20 1100   Activity/Group Checklist   Group   (IMR/Meet and Greet)   Attendance Did not attend   Attendance Duration (min) 46-60   Affect/Mood IMRA

## 2020-02-18 NOTE — CONSULTS
ENCOUNTER DATE: 02/18/20 10:39 AM  PATIENT NAME: Nina Bello   1955    [id number]  Age: 60 y o  Sex: female  AUTHOR: Erich Noble MD  PRIMARYCARE PHYSICIAN: Kendrick Schultz PA-C  INPATIENT ATTENDING PHYSICIAN: Jill Gayle MD  *-*-*-*-*-*-*-*-*-*-*-*-*-*-*-*-*-*-*-*-*-*-*-*-*-*-*-*-*-*-*-*-*-*-*-*-*-*-*-*-*-*-*-*-*-*-*-*-*-*-*-*-*-*-  REASON FORCONSULTATION:  QT prolongation    *-*-*-*-*-*-*-*-*-*-*-*-*-*-*-*-*-*-*-*-*-*-*-*-*-*-*-*-*-*-*-*-*-*-*-*-*-*-*-*-*-*-*-*-*-*-*-*-*-*-*-*-*-*-  HISTORY OF PRESENT ILLNESS:  Patient is a 82-year-old female with medical history significant for:  1  Significant psychiatric illnesses including schizoaffective disorder, bipolar disorder and depression  2  COPD   3  Hypothyroidism  4  Degenerative joint disease with history of compression fractures  5  GERD  6  History of ventral hernia    Patient has been admitted at the extended acute care facility at [facility] for management of her schizoaffective disorder bipolar type since July 2019  She is being treated with multiple anti medications and as part of her therapy underwent a routine ECG and this was reported as abnormal with QT prolongation of 483 milliseconds corrected QT  Cardiology is asked to evaluate and advise regarding her continued medical therapy with clozapine  Patient has no previous history of coronary artery disease, arrhythmias, congestive heart failure or diabetes  She is a reliable historian and denies any recent unusual chest pain, palpitations or shortness of breath  She does feeling tired all the times which she relates to use of her medications  She does have continued issues with psychosis and fear      *-*-*-*-*-*-*-*-*-*-*-*-*-*-*-*-*-*-*-*-*-*-*-*-*-*-*-*-*-*-*-*-*-*-*-*-*-*-*-*-*-*-*-*-*-*-*-*-*-*-*-*-*-*  PAST MEDICAL HISTORY:   Past Medical History:  Diagnosis Date   Acid reflux    Anxiety    Asthma    Bipolar 1 disorder (HCC)    Chronic pain disorder    Chronic respiratory failure (HCC)    Compression fracture of fourth lumbar vertebra (HCC)    COPD (chronic obstructive pulmonary disease) (HCC)    Depression    GERD (gastroesophageal reflux disease)    History of home oxygen therapy    Hypothyroidism    Lipoma of upper extremity    Psychiatric illness    [facility]    Substance abuse ([facility] )    Nicotine   Thoracic compression fracture (HCC)    Ventral hernia       PAST SURGICAL HISTORY:   History reviewed  No pertinent surgical history      FAMILY HISTORY:  Family History  Problem Relation Age of Onset   Arthritis Mother       SOCIAL HISTORY:  [unfilled]  Social History    Tobacco Use  Smoking Status Current Every Day Smoker   Packs/day: 0 20   Types: Cigarettes  Smokeless Tobacco Never Used    Social History    Substance and Sexual Activity  Alcohol Use Never   Frequency: Never   Binge frequency: Never    Social History    Substance and Sexual Activity  Drug Use No    [id number]    *-*-*-*-*-*-*-*-*-*-*-*-*-*-*-*-*-*-*-*-*-*-*-*-*-*-*-*-*-*-*-*-*-*-*-*-*-*-*-*-*-*-*-*-*-*-*-*-*-*-*-*-*-*  ALLERGIES:  Allergies  Allergen Reactions   Peanut-Containing Drug Products Anaphylaxis   Shellfish-Derived Products Anaphylaxis   Antihistamines, Chlorpheniramine-Type    Aspirin    Atrovent [Ipratropium]    Elavil [Amitriptyline]    Iodine     Other reaction(s): Unknown Reaction   Levaquin [Levofloxacin]    Novocain [Procaine]    Penicillins     Able to tolerate azithromycin and vancomycin   Prolixin [Fluphenazine]    Sulfa Antibiotics Other (See Comments)    Unknown Zithromax-Tight Throat   Zithromax [Azithromycin] Throat Swelling    Tight throat    *-*-*-*-*-*-*-*-*-*-*-*-*-*-*-*-*-*-*-*-*-*-*-*-*-*-*-*-*-*-*-*-*-*-*-*-*-*-*-*-*-*-*-*-*-*-*-*-*-*-*-*-*-*  CURRENT HOSPITAL MEDICATIONS:     Current Facility-Administered Medications:     acetaminophen (TYLENOL) tablet 325 mg, 325 mg, Oral, Q6H PRN, Shilpa Trujillo MD    acetaminophen (TYLENOL) tablet 650 mg, 650 mg, Oral, Q6H PRN, Sarah Hanna MD    acetaminophen (TYLENOL) tablet 650 mg, 650 mg, Oral, Q8H PRN, Sarah Hanna MD    albuterol (PROVENTIL HFA,VENTOLIN HFA) inhaler 2 puff, 2 puff, Inhalation, Q4H PRN, Sarah Hanna MD, 2 puff at 10/11/19 0424    aluminum-magnesium hydroxide-simethicone (MYLANTA) 200-200-20 mg/5 mL oral suspension 15 mL, 15 mL, Oral, Q4H PRN, Sarah Hanna MD, 15 mL at 01/26/20 1021    ammonium lactate (LAC-HYDRIN) 12 % lotion 1 application, 1 application, Topical, BID PRN, Sarah Hanna MD    bacitracin topical ointment 1 small application, 1 small application, Topical, Daily, Ivanna Rosado, ABILIO, 1 small application at 13/83/61 7875    benzonatate (TESSALON PERLES) capsule 100 mg, 100 mg, Oral, TID PRN, Sarah Hanna MD    benztropine (COGENTIN) injection 1 mg, 1 mg, Intramuscular, Q8H PRN, Sarah Hanna MD    carbamide peroxide (DEBROX) 6 5 % otic solution 5 drop, 5 drop, Left Ear, BID PRN, Hodan Montemayor MD    cloZAPine (CLOZARIL) tablet 25 mg, 25 mg, Oral, BID, Sarah Hanna MD, 25 mg at 02/17/20 2118    cloZAPine (CLOZARIL) tablet 50 mg, 50 mg, Oral, BID, Sarah Hanna MD, 50 mg at 02/17/20 2118    docusate sodium (COLACE) capsule 100 mg, 100 mg, Oral, BID PRN, Sarah Hanna MD    EPINEPHrine PF (ADRENALIN) 1 mg/mL injection 0 15 mg, 0 15 mg, Intramuscular, Once PRN, Sarah Hanna MD    fluticasone-vilanterol (BREO ELLIPTA) 200-25 MCG/INH inhaler 1 puff, 1 puff, Inhalation, Daily, Sarah Hanna MD, 1 puff at 02/18/20 0825    ketotifen (ZADITOR) 0 025 % ophthalmic solution 1 drop, 1 drop, Right Eye, BID PRN, Sarah Hanna MD    levothyroxine tablet 125 mcg, 125 mcg, Oral, Early Morning, Sarah Hanna MD, 125 mcg at 02/18/20 0606    magnesium hydroxide (MILK OF MAGNESIA) 400 mg/5 mL oral suspension 30 mL, 30 mL, Oral, Daily PRN, Sarah Hanna MD    montelukast (SINGULAIR) tablet 10 mg, 10 mg, Oral, HS, Sarah Hanna MD, 10 mg at 01/16/20 2106    OLANZapine (ZyPREXA) IM injection 5 mg, 5 mg, Intramuscular, Q8H PRN, Dalton Garner MD    OLANZapine (ZyPREXA) tablet 5 mg, 5 mg, Oral, Q8H PRN, Dalton Garner MD    ondansetron (ZOFRAN-ODT) dispersible tablet 4 mg, 4 mg, Oral, Q6H PRN, Dalton Garner MD, 4 mg at 12/09/19 1757    pantoprazole (PROTONIX) EC tablet 40 mg, 40 mg, Oral, Early Morning, Dalton Garner MD, 40 mg at 02/18/20 0606    polyethylene glycol (MIRALAX) packet 17 g, 17 g, Oral, Daily PRN, Dalton Garner MD    polyvinyl alcohol (LIQUIFILM TEARS) 1 4 % ophthalmic solution 1 drop, 1 drop, Both Eyes, Q3H PRN, Dalton Garner MD    sertraline (ZOLOFT) tablet 175 mg, 175 mg, Oral, Daily, Dalton Garner MD, 175 mg at 02/18/20 4750    sucralfate (CARAFATE) oral suspension 1,000 mg, 1,000 mg, Oral, BID, Cat Torres MD, 1,000 mg at 02/18/20 0606    theophylline (JEF-24) 24 hr capsule 200 mg, 200 mg, Oral, Daily, Dalton Garner MD, 200 mg at 02/18/20 0824    tiotropium (SPIRIVA) capsule for inhaler 18 mcg, 18 mcg, Inhalation, Daily, Dalton Garner MD, 18 mcg at 02/18/20 0829    traZODone (DESYREL) tablet 25 mg, 25 mg, Oral, HS PRN, Dalton Garner MD    *-*-*-*-*-*-*-*-*-*-*-*-*-*-*-*-*-*-*-*-*-*-*-*-*-*-*-*-*-*-*-*-*-*-*-*-*-*-*-*-*-*-*-*-*-*-*-*-*-*-*-*-*-*  REVIEW OF SYMPTOMS:    Positive for:  Feeling depressed, afraid, fatigued  Negative for: All remaining as reviewed below and in HPI      SYSTEM SYMPTOMS REVIEWED:  Generalweight change, fever, night sweats  Respiratoryl Wheezing, shortness of breath, cough, URI symptoms, sputum, blood  Cardiovascularchest pain, syncope, dyspnea on exertion, edema, decline in exercise tolerance, claudication   Gastrointestinalpersistent vomiting, diarrhea, abdominal distention, blood in stool   Muscular or skeletaljoint pain or swelling   Neurologicheadaches, syncope, abnormal movement  Hematologichistory of easy bruising and bleeding   Endocrinethyroid enlargement, heat or cold intolerance, polyuria   Psychiatricanxiety, depression *-*-*-*-*-*-*-*-*-*-*-*-*-*-*-*-*-*-*-*-*-*-*-*-*-*-*-*-*-*-*-*-*-*-*-*-*-*-*-*-*-*-*-*-*-*-*-*-*-*-*-*-*-*-  VITAL SIGNS:  Vitals:   02/17/20 0730 02/17/20 0732 02/17/20 1643 02/18/20 0700  BP: 109/67 100/55 100/68 128/73  BP Location: Left arm Left arm Right arm Right arm  Pulse: 84 65 90 91  Resp: 16 17 20  Temp: 97 6 °F (36 4 °C)  97 8 °F (36 6 °C) (!) 97 2 °F (36 2 °C)  TempSrc: Temporal  Temporal   SpO2:   94%   Weight:      Height:        Weight (last 2 days)    Date/Time   Weight   02/16/20 0730   66 5 (146 6)        ,   Wt Readings from Last 3 Encounters:  02/16/20 66 5 kg (146 lb 9 6 oz)  07/02/19 66 5 kg (146 lb 9 7 oz)  05/02/19 67 2 kg (148 lb 2 4 oz)   , Body mass index is 25 16 kg/m²  *-*-*-*-*-*-*-*-*-*-*-*-*-*-*-*-*-*-*-*-*-*-*-*-*-*-*-*-*-*-*-*-*-*-*-*-*-*-*-*-*-*-*-*-*-*-*-*-*-*-*-*-*-*-  PHYSICAL EXAM:  General Appearance:    Alert, cooperative, no distress, appears stated age  Head, Eyes, ENT:    No obvious abnormality, moist mucous mebranes  Neck:   Supple, no carotid bruit or JVD  Back:     Symmetric, no curvature  Lungs:     Respirations unlabored  Clear to auscultation bilaterally,   Chest wall:    No tenderness or deformity  Heart:    Regular rate and rhythm, normal intensity heart sounds, regular rate and rhythm, unable to appreciate any murmur  Abdomen:     Soft, non-tender, No obvious masses, or organomegaly  Extremities:   Extremities normal, no cyanosis or edema   Skin:   Skin color, texture, turgor normal, no rashes or lesions    *-*-*-*-*-*-*-*-*-*-*-*-*-*-*-*-*-*-*-*-*-*-*-*-*-*-*-*-*-*-*-*-*-*-*-*-*-*-*-*-*-*-*-*-*-*-*-*-*-*-*-*-*-*-  LABORATORY DATA:  I have personally reviewed pertinent labs  Recent blood work from 15th of February shows stable hemoglobin and hematocrit  Last blood chemistry was in October 2019 and showed normal renal function and electrolytes  Has not had magnesium check for a long time      Cardiac Profile:   Troponin I  Date Value Ref Range Status  05/01/2019 <0 01 0 00 - 0 03 ng/mL Final    Comment:      Negative: 0 00-0 03  Indeterminate: 0 04-0 12  Positive: >0 12    04/07/2019 <0 01 0 00 - 0 03 ng/mL Final    Comment:      Negative: 0 00-0 03  Indeterminate: 0 04-0 12  Positive: >0 12    02/24/2016 <0 02 <=0 08 ng/mL Final  05/10/2015 <0 04 0 00 - 0 04 ng/mL Final    Comment:    The above 1 analytes were performed by [facility]  [address] [facility] [address]    05/10/2015 0 04 0 00 - 0 04 ng/mL Final    Comment:    The above 1 analytes were [facility] by [facility]  [address] [facility] [address]    04/26/2015 <0 04 0 00 - 0 04 ng/mL Final    Comment:    The above 1 analytes were performed by [facility]  [address] [place], [place] [address] [id number]        CBC:   Results from last 7 days  Lab Units 02/15/20  0620  WBC Thousand/uL 6 30  HEMOGLOBIN g/dL 14 0  HEMATOCRIT % 41 8  PLATELETS Thousands/uL 219      PT/INR: No results found for: PT, INR,     Magnesium:       Phosphorous:       Microbiology:              *-*-*-*-*-*-*-*-*-*-*-*-*-*-*-*-*-*-*-*-*-*-*-*-*-*-*-*-*-*-*-*-*-*-*-*-*-*-*-*-*-*-*-*-*-*-*-*-*-*-*-*-*-*-  RADIOLOGY RESULTS:  No results found  *-*-*-*-*-*-*-*-*-*-*-*-*-*-*-*-*-*-*-*-*-*-*-*-*-*-*-*-*-*-*-*-*-*-*-*-*-*-*-*-*-*-*-*-*-*-*-*-*-*-*-*-*-*-  CURRENT ECG:  Results for orders placed or performed during the hospital encounter of 07/23/19  ECG 12 lead  Result Value   Ventricular Rate 78   Atrial Rate 78   AK Interval 158   QRSD Interval 84   QT Interval 424   QTC Interval 483   P Axis 75   QRS Axis -63   T Wave Axis 43   Narrative   Normal sinus rhythm  Left axis deviation  Low voltage QRS  Inferior infarct (cited on or before 10-NOLA-2020)  Abnormal ECG  When compared with ECG of 10-NOLA-2020 20:31,  No significant change was found  Confirmed by Erich Noble ([id number]) on 2/17/2020 9:51:52 PM      ECHOCARDIOGRAM AND OTHER CARDIOLOGY RESULTS:  No results found for this or any previous visit    No results found for this or any previous visit  No results found for this or any previous visit  No results found for this or any previous visit  *-*-*-*-*-*-*-*-*-*-*-*-*-*-*-*-*-*-*-*-*-*-*-*-*-*-*-*-*-*-*-*-*-*-*-*-*-*-*-*-*-*-*-*-*-*-*-*-*-*-*-*-*-*-  CARDIOLOGY ASSESSMENT & PLAN:  1  Acquired QT prolongation  2  Schizoaffective disorder, bipolar type  3  Hypothyroidism    Close review of patient's ECG shows that corrected QT interval is infact around 430 msecs  Machine read number is probably incorrect and results from poor delinieation of T wave ending from baseline     - At this time, would continue patient's current medical therapy    - recommend continued periodic repeating of ECG and monitoring of ELECTROLYTES AND THYROID FUNCTION TO A BMP , Mg and TSH

## 2020-02-18 NOTE — ASSESSMENT & PLAN NOTE
Patient is still anxious about going outside to closed spaces like the [facility] where there is concrete and glass, as opposed to open spaces! EKG showed slight increase in QTc at 483 and will get a cardiology consult and see if we can continue her on the clozapine  Still fearful and anxious about dying from choking and from not able to breath, still suspicious of certain staff about her oxygen concentrator and inhalant  Still has tendency to be psychosomatic and anxious off and on, and needs constant reminders about taking showers consistently, finally did accept another day pass to a park with staff before pass with sister  No side effects and attending over 50% of groups,  reported,  Compliant with meds, tends to spend lot of time on bed in her room  Her sisiter did visit yesterday with her on the unit    Spoke to cardiologist who examined patient and checked EKG and felt there is nothing to worry about and all she needs is to have a repeat EKG in a couple of weeks      Current medications:    Current Facility-Administered Medications:     acetaminophen (TYLENOL) tablet 325 mg, 325 mg, Oral, Q6H PRN, Christian Bennett MD    acetaminophen (TYLENOL) tablet 650 mg, 650 mg, Oral, Q6H PRN, Christian Bennett MD    acetaminophen (TYLENOL) tablet 650 mg, 650 mg, Oral, Q8H PRN, Christian Bennett MD    albuterol (PROVENTIL HFA,VENTOLIN HFA) inhaler 2 puff, 2 puff, Inhalation, Q4H PRN, Christian Bennett MD, 2 puff at 10/11/19 0424    aluminum-magnesium hydroxide-simethicone (MYLANTA) 200-200-20 mg/5 mL oral suspension 15 mL, 15 mL, Oral, Q4H PRN, Christian Bennett MD, 15 mL at 01/26/20 1021    ammonium lactate (LAC-HYDRIN) 12 % lotion 1 application, 1 application, Topical, BID PRN, Christian Bennett MD    bacitracin topical ointment 1 small application, 1 small application, Topical, Daily, Lamine Merchant, ABILIO, 1 small application at 36/87/12 0853    benzonatate (TESSALON PERLES) capsule 100 mg, 100 mg, Oral, TID PRN, Christian Bennett MD  [employer] benztropine (COGENTIN) injection 1 mg, 1 mg, Intramuscular, Q8H PRN, Christian Bennett MD    carbamide peroxide (DEBROX) 6 5 % otic solution 5 drop, 5 drop, Left Ear, BID PRN, Kirk Paz MD    cloZAPine (CLOZARIL) tablet 25 mg, 25 mg, Oral, BID, Christian Bennett MD, 25 mg at 02/17/20 2118    cloZAPine (CLOZARIL) tablet 50 mg, 50 mg, Oral, BID, Christian Bennett MD, 50 mg at 02/17/20 2118    docusate sodium (COLACE) capsule 100 mg, 100 mg, Oral, BID, Christian Bennett MD    EPINEPHrine PF (ADRENALIN) 1 mg/mL injection 0 15 mg, 0 15 mg, Intramuscular, Once PRN, Christian Bennett MD    fluticasone-vilanterol (BREO ELLIPTA) 200-25 MCG/INH inhaler 1 puff, 1 puff, Inhalation, Daily, Christian Bennett MD, 1 puff at 02/18/20 0825    ketotifen (ZADITOR) 0 025 % ophthalmic solution 1 drop, 1 drop, Right Eye, BID PRN, Christian Bennett MD    levothyroxine tablet 125 mcg, 125 mcg, Oral, Early Morning, Christian Bennett MD, 125 mcg at 02/18/20 0606    magnesium hydroxide (MILK OF MAGNESIA) 400 mg/5 mL oral suspension 30 mL, 30 mL, Oral, Daily PRN, Christian Bennett MD    montelukast (SINGULAIR) tablet 10 mg, 10 mg, Oral, HS, Christian Bennett MD, 10 mg at 01/16/20 2106    OLANZapine (ZyPREXA) IM injection 5 mg, 5 mg, Intramuscular, Q8H PRN, Christian Bennett MD    OLANZapine (ZyPREXA) tablet 5 mg, 5 mg, Oral, Q8H PRN, Christian Bennett MD    ondansetron (ZOFRAN-ODT) dispersible tablet 4 mg, 4 mg, Oral, Q6H PRN, Christian Bennett MD, 4 mg at 12/09/19 1757    pantoprazole (PROTONIX) EC tablet 40 mg, 40 mg, Oral, Early Morning, Christian Bennett MD, 40 mg at 02/18/20 0606    polyethylene glycol (MIRALAX) packet 17 g, 17 g, Oral, Daily PRN, Christian Bennett MD    polyvinyl alcohol (LIQUIFILM TEARS) 1 4 % ophthalmic solution 1 drop, 1 drop, Both Eyes, Q3H PRN, Christian Bennett MD    sertraline (ZOLOFT) tablet 175 mg, 175 mg, Oral, Daily, Christian Bennett MD, 175 mg at 02/18/20 0824    sucralfate (CARAFATE) oral suspension 1,000 mg, 1,000 mg, Oral, BID, Cat Torres MD, 1,000 mg at 02/18/20 0606    theophylline (JEF-24) 24 hr capsule 200 mg, 200 mg, Oral, Daily, Christian Bennett MD, 200 mg at 02/18/20 0824    tiotropium [facility]) capsule for inhaler 18 mcg, 18 mcg, Inhalation, Daily, Christian Bennett MD, 18 mcg at 02/18/20 5766    traZODone (DESYREL) tablet 25 mg, 25 mg, Oral, HS PRN, Christian Bennett MD  Justification if on more than two antipsychotics: not applicable  Side effects if any: none    Risks , benefits, side effects and precautions of medications discussed with patient and reminded patient to let us know any problems with medications     Objective:     Vital Signs:  Vitals:    02/17/20 0730 02/17/20 0732 02/17/20 1643 02/18/20 0700   BP: 109/67 100/55 100/68 128/73   BP Location: Left arm Left arm Right arm Right arm   Pulse: 84 65 90 91   Resp: 16  17 20   Temp: 97 6 °F (36 4 °C)  97 8 °F (36 6 °C) (!) 97 2 °F (36 2 °C)   TempSrc: Temporal  Temporal    SpO2:   94%    Weight:       Height:         Appearance:  age appropriate and older than stated age again disheveled id uncombed hair   Behavior:  deviant, evasive and guarded suspicious at   Speech:  delayed, increased latency of response and tangential    Mood:  anxious, euphoric and irritable    Affect:  increased in intensity, increased in range, mood-congruent and redirectable   Thought Process:  circumstantial, concrete, goal directed, illogical and perserverative with stilted speech   Thought Content:  delusions  grandiose and persecutory no current suicidal homicidal thoughts or plans reported  No phobias obsessions or compulsions elicited  No preoccupation with violence  Still with some psychosomatic symptoms and residual paranoia     Perceptual Disturbances: None    Risk Potential: Inability to care for herself and refusal to take showers regularly   Sensorium:  person, place, time/date, situation, day of week, month of year, year and time   Cognition:  grossly intact no deficit in recent or remote memory, no language deficit, aware of current events   Consciousness:  alert and awake    Attention: Fair   Intellect: Average   Insight:  Limited   Judgment: fair       Motor Activity: no abnormal movements         Recent Labs:  Results Reviewed     None          I/O Past 24 hours:  No intake/output data recorded  No intake/output data recorded  Assessment / Plan:     Schizoaffective disorder, bipolar type ([facility] [address] )  Overall status:  I improving    Reason for continued inpatient care: to assess ability to maintain improvement by attending to ADLs and taking showers regular and see how she does on outing with family after another outing with staff escort next week  Acceptance by patient: accepting now  Hopefulness in recovery: living at the [facility]  Understanding of medications :  yes  Involved in reintegration process: attending groups and has off grounds and off unit privileges   Trusting in relatoinship with psychiatrist:  Subha Soto now    Recommended Treatment:    Medication changes:  1) none today    Non-pharmacological treatments  1) Continue with individual therapy, group therapy, milieu therapy and occupational therapy using recovery principles and psycho-education about accepting illness and need for treatment   2) encourage to take showers twice a week and cooperate with treatment and adl skills as per her behav  plan  3) reschedule another pass with staff to community again before pass with sister to see if she would fully comply with assigned outing   Safety  1) Safety/communication plan established targeting dynamic risk factors above  Discharge Plan to a [facility] at [address] / Coordination of Care    Total floor / unit time spent today 15 minutes  Greater than 50% of total time was spent with the patient and / or family counseling and / or coordination of care  Receptive to listening and learning coping skills for management of symptoms and attending to ADLs       Patient's Rights, confidentiality and exceptions to confidentiality, use of automated medical record, [address] staff access to medical record, and consent to treatment reviewed      Vincent Olivares MD

## 2020-02-18 NOTE — PROGRESS NOTES
Progress Note - Madison Miner 1955, 60 y o  female MRN: [id number]    [facility] Encounter: [id number]    Primary Care Provider: Poli Nguyen PA-C   Date and time admitted to hospital: 7/23/2019  5:30 PM        * Schizoaffective disorder, bipolar type [facility])  Assessment & Plan  Patient is still anxious about going outside to closed spaces like the [facility] where there is concrete and glass, as opposed to open spaces! EKG showed slight increase in QTc at 483 and will get a cardiology consult and see if we can continue her on the clozapine  Still fearful and anxious about dying from choking and from not able to breath, still suspicious of certain staff about her oxygen concentrator and inhalant  Still has tendency to be psychosomatic and anxious off and on, and needs constant reminders about taking showers consistently, finally did accept another day pass to a park with staff before pass with sister  No side effects and attending over 50% of groups,  reported,  Compliant with meds, tends to spend lot of time on bed in her room  Her sisiter did visit yesterday with her on the unit    Spoke to cardiologist who examined patient and checked EKG and felt there is nothing to worry about and all she needs is to have a repeat EKG in a couple of weeks      Current medications:    Current Facility-Administered Medications:     acetaminophen (TYLENOL) tablet 325 mg, 325 mg, Oral, Q6H PRN, Alirio Frazier MD    acetaminophen (TYLENOL) tablet 650 mg, 650 mg, Oral, Q6H PRN, Alirio Frazier MD    acetaminophen (TYLENOL) tablet 650 mg, 650 mg, Oral, Q8H PRN, Alirio Frazier MD    albuterol (PROVENTIL HFA,VENTOLIN HFA) inhaler 2 puff, 2 puff, Inhalation, Q4H PRN, Alirio Frazier MD, 2 puff at 10/11/19 0424    aluminum-magnesium hydroxide-simethicone (MYLANTA) 200-200-20 mg/5 mL oral suspension 15 mL, 15 mL, Oral, Q4H PRN, Alirio Frazier MD, 15 mL at 01/26/20 1021    ammonium lactate (LAC-HYDRIN) 12 % lotion 1 application, 1 application, Topical, BID PRN, Sindy Day MD    bacitracin topical ointment 1 small application, 1 small application, Topical, Daily, Dulce Maria Forth, CRNP, 1 small application at 98/92/05 3146    benzonatate (TESSALON PERLES) capsule 100 mg, 100 mg, Oral, TID PRN, Sindy Day MD    benztropine (COGENTIN) injection 1 mg, 1 mg, Intramuscular, Q8H PRN, Sindy Day MD    carbamide peroxide (DEBROX) 6 5 % otic solution 5 drop, 5 drop, Left Ear, BID PRN, Lena Dunbar MD    cloZAPine (CLOZARIL) tablet 25 mg, 25 mg, Oral, BID, Sindy Day MD, 25 mg at 02/17/20 2118    cloZAPine (CLOZARIL) tablet 50 mg, 50 mg, Oral, BID, Sindy Day MD, 50 mg at 02/17/20 2118    docusate sodium (COLACE) capsule 100 mg, 100 mg, Oral, BID, Sindy Day MD    EPINEPHrine PF (ADRENALIN) 1 mg/mL injection 0 15 mg, 0 15 mg, Intramuscular, Once PRN, Sindy Day MD    fluticasone-vilanterol (BREO ELLIPTA) 200-25 MCG/INH inhaler 1 puff, 1 puff, Inhalation, Daily, Sindy Day MD, 1 puff at 02/18/20 0825    ketotifen (ZADITOR) 0 025 % ophthalmic solution 1 drop, 1 drop, Right Eye, BID PRN, Sindy Day MD    levothyroxine tablet 125 mcg, 125 mcg, Oral, Early Morning, Sindy Day MD, 125 mcg at 02/18/20 0606    magnesium hydroxide (MILK OF MAGNESIA) 400 mg/5 mL oral suspension 30 mL, 30 mL, Oral, Daily PRN, Sindy Day MD    montelukast (SINGULAIR) tablet 10 mg, 10 mg, Oral, HS, Sindy Day MD, 10 mg at 01/16/20 2106    OLANZapine (ZyPREXA) IM injection 5 mg, 5 mg, Intramuscular, Q8H PRN, Sindy Day MD    OLANZapine (ZyPREXA) tablet 5 mg, 5 mg, Oral, Q8H PRN, Sindy Day MD    ondansetron (ZOFRAN-ODT) dispersible tablet 4 mg, 4 mg, Oral, Q6H PRN, Sindy Day MD, 4 mg at 12/09/19 1757    pantoprazole (PROTONIX) EC tablet 40 mg, 40 mg, Oral, Early Morning, Sindy Day MD, 40 mg at 02/18/20 0606    polyethylene glycol (MIRALAX) packet 17 g, 17 g, Oral, Daily PRN, Sindy Day MD    polyvinyl alcohol (LIQUIFILM TEARS) 1 4 % ophthalmic solution 1 drop, 1 drop, Both Eyes, Q3H PRN, Shilpa Trujillo MD    sertraline (ZOLOFT) tablet 175 mg, 175 mg, Oral, Daily, Shilpa Trujillo MD, 175 mg at 02/18/20 1476    sucralfate (CARAFATE) oral suspension 1,000 mg, 1,000 mg, Oral, BID, Angelica Nageotte, MD, 1,000 mg at 02/18/20 0606    theophylline (JEF-24) 24 hr capsule 200 mg, 200 mg, Oral, Daily, Shilpa Trujillo MD, 200 mg at 02/18/20 7027    tiotropium [facility]) capsule for inhaler 18 mcg, 18 mcg, Inhalation, Daily, Shilpa Trujillo MD, 18 mcg at 02/18/20 7792    traZODone (DESYREL) tablet 25 mg, 25 mg, Oral, HS PRN, Shilpa Trujillo MD  Justification if on more than two antipsychotics: not applicable  Side effects if any: none    Risks , benefits, side effects and precautions of medications discussed with patient and reminded patient to let us know any problems with medications     Objective:     Vital Signs:  Vitals:    02/17/20 0730 02/17/20 0732 02/17/20 1643 02/18/20 0700   BP: 109/67 100/55 100/68 128/73   BP Location: Left arm Left arm Right arm Right arm   Pulse: 84 65 90 91   Resp: 16  17 20   Temp: 97 6 °F (36 4 °C)  97 8 °F (36 6 °C) (!) 97 2 °F (36 2 °C)   TempSrc: Temporal  Temporal    SpO2:   94%    Weight:       Height:         Appearance:  age appropriate and older than stated age again disheveled id uncombed hair   Behavior:  deviant, evasive and guarded suspicious at   Speech:  delayed, increased latency of response and tangential    Mood:  anxious, euphoric and irritable    Affect:  increased in intensity, increased in range, mood-congruent and redirectable   Thought Process:  circumstantial, concrete, goal directed, illogical and perserverative with stilted speech   Thought Content:  delusions  grandiose and persecutory no current suicidal homicidal thoughts or plans reported  No phobias obsessions or compulsions elicited  No preoccupation with violence  Still with some psychosomatic symptoms and residual paranoia  Perceptual Disturbances: None    Risk Potential: Inability to care for herself and refusal to take showers regularly   Sensorium:  person, place, time/date, situation, day of week, month of year, year and time   Cognition:  grossly intact no deficit in recent or remote memory, no language deficit, aware of current events   Consciousness:  alert and awake    Attention: Fair   Intellect: Average   Insight:  Limited   Judgment: fair       Motor Activity: no abnormal movements         Recent Labs:  Results Reviewed     None          I/O Past 24 hours:  No intake/output data recorded  No intake/output data recorded  Assessment / Plan:     Schizoaffective disorder, bipolar type ([facility] [address] )  Overall status:  I improving    Reason for continued inpatient care: to assess ability to maintain improvement by attending to ADLs and taking showers regular and see how she does on outing with family after another outing with staff escort next week  Acceptance by patient: accepting now  Hopefulness in recovery: living at the [facility]  Understanding of medications :  yes  Involved in reintegration process: attending groups and has off grounds and off unit privileges   Trusting in relatoinship with psychiatrist:  Mio Samuels now    Recommended Treatment:    Medication changes:  1) none today    Non-pharmacological treatments  1) Continue with individual therapy, group therapy, milieu therapy and occupational therapy using recovery principles and psycho-education about accepting illness and need for treatment   2) encourage to take showers twice a week and cooperate with treatment and adl skills as per her behav  plan  3) reschedule another pass with staff to community again before pass with sister to see if she would fully comply with assigned outing   Safety  1) Safety/communication plan established targeting dynamic risk factors above    Discharge Plan to a [facility] at [address]  / Coordination of Care    Total floor / unit time spent today 15 minutes  Greater than 50% of total time was spent with the patient and / or family counseling and / or coordination of care  Receptive to listening and learning coping skills for management of symptoms and attending to ADLs  Patient's Rights, confidentiality and exceptions to confidentiality, use of automated medical record, [address] [facility] staff access to medical record, and consent to treatment reviewed      Ivonne Nevarez MD

## 2020-02-18 NOTE — PROGRESS NOTES
02/18/20 0900   Team Meeting   Meeting Type Daily Rounds   Team Members Present   Team Members Present Physician;Nurse;; Other (Discipline and Name)   Physician Team Member Dr Ascencion Olivera, RN   Care Management Team Member Brenda Green   Other (Discipline and Name) Jasvir Hopkins LCSW; SHANIKA Sandoval     Patient had her EKG done  Sister visited  Slept

## 2020-02-18 NOTE — PROGRESS NOTES
02/18/20 0929   Team Meeting   Meeting Type Tx Team Meeting   Initial Conference Date 02/18/20   Next Conference Date 02/25/20   Team Members Present   Team Members Present Physician;Nurse;; Other (Discipline and Name)   Physician Team Member Dr Colton Nava Team Member Aba Trinidad, RN   Care Management Team Member Brenda Vargas   Other (Discipline and Name) Jarred Schmidt, JERW; SHANIKA Hairston   Patient/Family Present   Patient Present Yes   Patient's Family Present No     Patient attended treatment team meeting this morning without her self assessment completed  Patient attended 57% of groups offered last week  Patient had an off grounds trip with staff last week which did not go as well as hoped  Patient reports she has issues with being in the city surrounded by concrete and glass and had to hold on to the T "for dear life" because she felt she was floating away  Due to this, treatment team recommends that she have another pass with staff, this time to a park  Patient agreeable to this plan  A 30 day treatment plan review was completed with patient and team  Patient agreed to sign the document  Team and patient completed risk assessment and the patient did not verbalize any desire to elope from the program  Patient verbalized understanding of consequences of eloping from treatment while on a commitment  Patient verbalized no further questions or concerns at the conclusion of the meeting

## 2020-02-18 NOTE — SOCIAL WORK
Social Work Intern:    Intern met with patient for a therapeutic session  Patient had some concerns over her latest off-ground with staff  Session covered treatment goals and treatment progress  Patient was praised for the progress that she has made in her treatment  Patient expressed concerns about going on another off-ground with staff  Patient stated that she felt ready to go on a visit with her sister  Patient was encouraged to follow the steps recommended by her treatment team  Patient expressed experiencing feelings of nervousness and panic in open spaces  Patient expressed having an extreme fear of open spaces  Patient stated that she has had this struggle for years and did not think that practicing with staff would make it go away  Patient expressed feeling like a failure due to the way she acted during the pass  Patient stated that she had to hold on to her staff the entire time  It was explained to the patient that the outing was not a failure since she did try her best to remain composed and was able to go into [employer] to get her coffee  It was explained to the patient that recovery will take time and a couple practices to be able to become mentally strong during these situations  It was recommended to the patient to practice positive self-talk, deep breathing, and other relaxation techniques before and during the outings in order to remain calm  Patient expressed feeling more relaxed about the past situation after the session ended  A follow-up session will be scheduled next week to keep track of the patient's progress

## 2020-02-18 NOTE — PLAN OF CARE
Problem: Alteration in Thoughts and Perception  Goal: Attend and participate in unit activities, including therapeutic, recreational, and educational groups  Description  Interventions:  - Provide therapeutic and educational activities daily, encourage attendance and participation, and document same in the medical record     Outcome: Not Progressing     Problem: Ineffective Coping  Goal: Demonstrates healthy coping skills  Outcome: Not Progressing    Individual has been in bed most of the shift, she did not attend any groups but did participate in treatment team meeting  There has been no interactions with others in milieu  Appetite better today, eating 50% of breakfast and 75% of lunch  Cardiology consult completed, no new orders  Availability of staff made known  Will continue to monitor

## 2020-02-18 NOTE — PROGRESS NOTES
[address] enjoyed a visit from her sister, ate the mashed potatoes, carrots, cupcake she brought, but, not the meat which she felt was too tough  Warm interactions between them  Took part in PM Group  Had HS snack, came up on own for HS medicine Except Singulair & Colace  Of the latter, questions why it was ordered  "I don't need that " Declined Incentive Spirometer as too full from food this shift & was active, up & about  Wearing now her QHS humidified nasal O2 @ 1L for bed

## 2020-02-18 NOTE — TREATMENT TEAM
Not required to attend      02/18/20 1430   Activity/Group Checklist   Group   (Community Programming Wrap up )   Attendance Did not attend   Attendance Duration (min) 16-30   Affect/Mood IRMA

## 2020-02-19 NOTE — PROGRESS NOTES
Progress Note - Jose Ramon Roblero 1955, 60 y o  female MRN: [id number]    Unit/Bed#: [facility] 110-02 Encounter: [id number]    Primary Care Provider: Richar Bass PA-C   Date and time admitted to hospital: 7/23/2019  5:30 PM        * Schizoaffective disorder, bipolar type [facility])  Assessment & Plan  Patient was seen by cardiologist Dr Sarah Stanley yesterday and he felt that the here EKG is normal and does not need any further workup for follow-up at this time  However patient was demanding to get an echo and stress test Holter etc which I told her are unnecessary at this time  She was then complaining about staff again tampering with her oxygen concentrator last night and again reassured her that that cannot be happening here at all  She claims that she will take a shower tomorrow and she is not too happy that she has to try out another pass to the community before she can go with her sister but has accepted it reluctantly  He is still somewhat somatically preoccupied but has been attending groups and is friendly pleasant but somewhat guarded at times  Tolerating medications without any side effects or problems  No side effects reported  Compliant with medications  Eating fairly well but still afraid that she may choke and cannot breathe  Has been attending groups and continues to engage with a stilted speech as if talking in a court of law       Current medications:    Current Facility-Administered Medications:     acetaminophen (TYLENOL) tablet 325 mg, 325 mg, Oral, Q6H PRN, Roberto Shankar MD    acetaminophen (TYLENOL) tablet 650 mg, 650 mg, Oral, Q6H PRN, Roberto Shankar MD    acetaminophen (TYLENOL) tablet 650 mg, 650 mg, Oral, Q8H PRN, Roberto Shankar MD    albuterol (PROVENTIL HFA,VENTOLIN HFA) inhaler 2 puff, 2 puff, Inhalation, Q4H PRN, Roberto Shankar MD, 2 puff at 10/11/19 0424    aluminum-magnesium hydroxide-simethicone (MYLANTA) 200-200-20 mg/5 mL oral suspension 15 mL, 15 mL, Oral, Q4H PRN, Lum Hews Cloteal MD Santa, 15 mL at 01/26/20 1021    ammonium lactate (LAC-HYDRIN) 12 % lotion 1 application, 1 application, Topical, BID PRN, Chichi Lockett MD    bacitracin topical ointment 1 small application, 1 small application, Topical, Daily, Nav Don, CRNP, 1 small application at 56/15/18 0815    benzonatate (TESSALON PERLES) capsule 100 mg, 100 mg, Oral, TID PRN, Chichi Lockett MD    benztropine (COGENTIN) injection 1 mg, 1 mg, Intramuscular, Q8H PRN, Chichi Lockett MD    carbamide peroxide (DEBROX) 6 5 % otic solution 5 drop, 5 drop, Left Ear, BID PRN, Lara Draper MD    cloZAPine (CLOZARIL) tablet 25 mg, 25 mg, Oral, BID, Chichi Lockett MD, 25 mg at 02/18/20 2052    cloZAPine (CLOZARIL) tablet 50 mg, 50 mg, Oral, BID, Chichi Lockett MD, 50 mg at 02/18/20 2053    docusate sodium (COLACE) capsule 100 mg, 100 mg, Oral, BID PRN, Chichi Lockett MD    EPINEPHrine PF (ADRENALIN) 1 mg/mL injection 0 15 mg, 0 15 mg, Intramuscular, Once PRN, Chichi Lockett MD    fluticasone-vilanterol (BREO ELLIPTA) 200-25 MCG/INH inhaler 1 puff, 1 puff, Inhalation, Daily, Chichi Lockett MD, 1 puff at 02/18/20 0825    ketotifen (ZADITOR) 0 025 % ophthalmic solution 1 drop, 1 drop, Right Eye, BID PRN, Chichi Lockett MD    levothyroxine tablet 125 mcg, 125 mcg, Oral, Early Morning, Chichi Lockett MD, 125 mcg at 02/19/20 0600    magnesium hydroxide (MILK OF MAGNESIA) 400 mg/5 mL oral suspension 30 mL, 30 mL, Oral, Daily PRN, Chichi Lockett MD    montelukast (SINGULAIR) tablet 10 mg, 10 mg, Oral, HS, Chichi Lockett MD, 10 mg at 01/16/20 2106    OLANZapine (ZyPREXA) IM injection 5 mg, 5 mg, Intramuscular, Q8H PRN, Chichi Lockett MD    OLANZapine (ZyPREXA) tablet 5 mg, 5 mg, Oral, Q8H PRN, Chichi Lockett MD    ondansetron (ZOFRAN-ODT) dispersible tablet 4 mg, 4 mg, Oral, Q6H PRN, Chichi Lockett MD, 4 mg at 12/09/19 1757    pantoprazole (PROTONIX) EC tablet 40 mg, 40 mg, Oral, Early Morning, Chichi Lockett MD, 40 mg at 02/19/20 0600    polyethylene glycol (MIRALAX) packet 17 g, 17 g, Oral, Daily PRN, Dalton Garner MD    polyvinyl alcohol (LIQUIFILM TEARS) 1 4 % ophthalmic solution 1 drop, 1 drop, Both Eyes, Q3H PRN, Dalton Garner MD    sertraline (ZOLOFT) tablet 175 mg, 175 mg, Oral, Daily, Dalton Garner MD, 175 mg at 02/19/20 0815    sucralfate (CARAFATE) oral suspension 1,000 mg, 1,000 mg, Oral, BID, Cat Torres MD, 1,000 mg at 02/19/20 0600    theophylline (JEF-24) 24 hr capsule 200 mg, 200 mg, Oral, Daily, Dalton Garner MD, 200 mg at 02/19/20 0815    tiotropium (SPIRIVA) capsule for inhaler 18 mcg, 18 mcg, Inhalation, Daily, Dalton Garner MD, 18 mcg at 02/18/20 5424    traZODone (DESYREL) tablet 25 mg, 25 mg, Oral, HS PRN, Dalton Garner MD  Justification if on more than two antipsychotics: not applicable  Side effects if any: none    Risks , benefits, side effects and precautions of medications discussed with patient and reminded patient to let us know any problems with medications     Objective:     Vital Signs:  Vitals:    02/18/20 2000 02/18/20 2125 02/19/20 0730 02/19/20 0732   BP: 118/56  109/73 104/58   BP Location: Left arm  Left arm Left arm   Pulse: 76 92 84 70   Resp: 16  18    Temp: (!) 96 7 °F (35 9 °C)  (!) 97 3 °F (36 3 °C)    TempSrc: Temporal  Temporal    SpO2: 90% 91%     Weight:       Height:         Appearance:  age appropriate and older than stated age better groomed but laying in bed   Behavior:  deviant, evasive and guarded suspicious at times   Speech:  delayed, increased latency of response and tangential    Mood:  anxious, euphoric and irritable    Affect:  increased in intensity, increased in range, mood-congruent and redirectable   Thought Process:  circumstantial, concrete, goal directed, illogical and perserverative with stilted speech   Thought Content:  delusions  grandiose and persecutory no phobias or compulsions or distorted perception of body weight, No current s/h thoughts intent or plans, no overt delusions but does appear paranoid at times about staff tampering with her oxygen concentrator   Perceptual Disturbances: None    Risk Potential: Inability to care for herself and refusal to take showers regularly   Sensorium:  person, place, time/date, situation, day of week, month of year, year and time   Cognition:  grossly intact no deficit in recent o r remote memory, aware of current events,    Consciousness:  alert and awake    Attention: improving   Intellect: average   Insight:  limited   Judgment: fair       Motor Activity: no abnormal movements         Recent Labs:  Results Reviewed     None          I/O Past 24 hours:  No intake/output data recorded  No intake/output data recorded  Assessment / Plan:     Schizoaffective disorder, bipolar type ([facility] [address] )  Overall status:  I improving    Reason for continued inpatient care: to assess ability to maintain improvement by attending to ADLs and taking showers regular and see how she does on outing with family after another outing with staff escort next week  Acceptance by patient: accepting now  Hopefulness in recovery: living at the [facility]  Understanding of medications :  yes  Involved in reintegration process: attending groups and has off grounds and off unit privileges   Trusting in relatoinship with psychiatrist:  Subha Soto now    Recommended Treatment:    Medication changes:  1) none today    Non-pharmacological treatments  1) Continue with individual therapy, group therapy, milieu therapy and occupational therapy using recovery principles and psycho-education about accepting illness and need for treatment   2) encourage to take showers twice a week and cooperate with treatment and adl skills as per her behav  plan  3) reschedule another pass with staff to community again before pass with sister to see if she would fully comply with assigned outing   Safety  1) Safety/communication plan established targeting dynamic risk factors above    Discharge Plan to a [facility] / Coordination of Care    Total floor / unit time spent today 15 minutes  Greater than 50% of total time was spent with the patient and / or family counseling and / or coordination of care  Receptive to listening and learning coping skills for management of symptoms and attending to ADLs  Patient's Rights, confidentiality and exceptions to confidentiality, use of automated medical record, Jeb Patrick staff access to medical record, and consent to treatment reviewed      Carissa Willis MD

## 2020-02-19 NOTE — PLAN OF CARE
Problem: Alteration in Thoughts and Perception  Goal: Verbalize thoughts and feelings  Description  Interventions:  - Promote a nonjudgmental and trusting relationship with the patient through active listening and therapeutic communication  - Assess patient's level of functioning, behavior and potential for risk  - Engage patient in 1 on 1 interactions for a minimum of 15 minutes each session  - Encourage patient to express fears, feelings, frustrations, and discuss symptoms    - [place] patient to reality, help patient recognize reality-based thinking   - Administer medications as ordered and assess for potential side effects  - Provide the patient education related to the signs and symptoms of the illness and desired effects of prescribed medications  Outcome: Progressing  Goal: Agree to be compliant with medication regime, as prescribed and report medication side effects  Description  Interventions:  - Offer appropriate PRN medication and supervise ingestion; conduct aims, as needed   Outcome: Progressing  Goal: Attend and participate in unit activities, including therapeutic, recreational, and educational groups  Description  Interventions:  - Provide therapeutic and educational activities daily, encourage attendance and participation, and document same in the medical record     CERTIFIED PEER SPECIALIST INTERVENTIONS:    Complete peer assessment with patient to assess their needs and identify their goals to complete while in the recovery program as well as once discharged into the community  Patient will complete WRAP Plan, Crisis Plan and 5 Life Domains  Patient will attend 50% of groups offered on the unit  Patient will complete a goal card weekly      Outcome: Progressing  Goal: Complete daily ADLs, including personal hygiene independently, as able  Description  Interventions:  - Observe, teach, and assist patient with ADLS  - Monitor and promote a balance of rest/activity, with adequate nutrition and elimination   Outcome: Not Progressing     Tu Loser has been intermittently visible on the unit in between select groups and meals  Compliant with her scheduled medications, appetite remains poor  No somatic complaints at this time of documentation  Did not tend to her hygiene this shift  Often chooses to retreat to her room in between scheduled activities  Pleasant during interactions with this writer, minimal interactions with peers  Will continue to monitor

## 2020-02-19 NOTE — PLAN OF CARE
Problem: PAIN - ADULT  Goal: Verbalizes/displays adequate comfort level or baseline comfort level  Description  Interventions:  - Encourage patient to monitor pain and request assistance  - Assess pain using appropriate pain scale  - Administer analgesics based on type and severity of pain and evaluate response  - Implement non-pharmacological measures as appropriate and evaluate response  - Consider cultural and social influences on pain and pain management  - Notify physician/advanced practitioner if interventions unsuccessful or patient reports new pain  Outcome: Progressing     Problem: SAFETY ADULT  Goal: Patient will remain free of falls  Description  INTERVENTIONS:  - Assess patient frequently for physical needs  -  Identify cognitive and physical deficits and behaviors that affect risk of falls    -  [place] fall precautions as indicated by assessment   - Educate patient/family on patient safety including physical limitations  - Instruct patient to call for assistance with activity based on assessment  - Modify environment to reduce risk of injury  - Consider OT/PT consult to assist with strengthening/mobility  Outcome: Progressing     Problem: SLEEP DISTURBANCE  Goal: Will exhibit normal sleeping pattern  Description  Interventions:  -  Assess the patients sleep pattern, noting recent changes  - Administer medication as ordered  - Decrease environmental stimuli, including noise, as appropriate during the night  - Encourage the patient to actively participate in unit groups and or exercise during the day to enhance ability to achieve adequate sleep at night  - Assess the patient, in the morning, encouraging a description of sleep experience  Outcome: Progressing    Maggie maintained on ongoing fall and SAFE precaution  Fidelina Begum in bed with eyes closed, breath even and unlabored   On O2 with humidifier @1L/m via nasal cannula   Q 15 minutes rounding   No somatic complaint overnight  No PRN needed for sleep aid   No indication of pain or discomfort   No respiratory distress   Will continue to monitor

## 2020-02-19 NOTE — TREATMENT TEAM
02/18/20 1500   Activity/Group Checklist   Group   (Recovery Workshop )   Attendance Did not attend   Attendance Duration (min) 46-60   Affect/Mood IRMA

## 2020-02-19 NOTE — ASSESSMENT & PLAN NOTE
Patient was seen by cardiologist Dr Chito Andrews yesterday and he felt that the here EKG is normal and does not need any further workup for follow-up at this time  However patient was demanding to get an echo and stress test Holter etc which I told her are unnecessary at this time  She was then complaining about staff again tampering with her oxygen concentrator last night and again reassured her that that cannot be happening here at all  She claims that she will take a shower tomorrow and she is not too happy that she has to try out another pass to the community before she can go with her sister but has accepted it reluctantly  He is still somewhat somatically preoccupied but has been attending groups and is friendly pleasant but somewhat guarded at times  Tolerating medications without any side effects or problems  No side effects reported  Compliant with medications  Eating fairly well but still afraid that she may choke and cannot breathe  Has been attending groups and continues to engage with a stilted speech as if talking in a court of law       Current medications:    Current Facility-Administered Medications:     acetaminophen (TYLENOL) tablet 325 mg, 325 mg, Oral, Q6H PRN, Dalton Garner MD    acetaminophen (TYLENOL) tablet 650 mg, 650 mg, Oral, Q6H PRN, Dalton Garner MD    acetaminophen (TYLENOL) tablet 650 mg, 650 mg, Oral, Q8H PRN, Dalton Garner MD    albuterol (PROVENTIL HFA,VENTOLIN HFA) inhaler 2 puff, 2 puff, Inhalation, Q4H PRN, Dalton Garner MD, 2 puff at 10/11/19 0424    aluminum-magnesium hydroxide-simethicone (MYLANTA) 200-200-20 mg/5 mL oral suspension 15 mL, 15 mL, Oral, Q4H PRN, Dalton Garner MD, 15 mL at 01/26/20 1021    ammonium lactate (LAC-HYDRIN) 12 % lotion 1 application, 1 application, Topical, BID PRN, Dalton Garner MD    bacitracin topical ointment 1 small application, 1 small application, Topical, Daily, Arnie LinerABILIO, 1 small application at 71/58/83 0815    benzonatate (TESSALON PERLES) capsule 100 mg, 100 mg, Oral, TID PRN, Greer Beltran MD    benztropine (COGENTIN) injection 1 mg, 1 mg, Intramuscular, Q8H PRN, Greer Beltran MD    carbamide peroxide (DEBROX) 6 5 % otic solution 5 drop, 5 drop, Left Ear, BID PRN, Alexy Caro MD    cloZAPine (CLOZARIL) tablet 25 mg, 25 mg, Oral, BID, Greer Beltran MD, 25 mg at 02/18/20 2052    cloZAPine (CLOZARIL) tablet 50 mg, 50 mg, Oral, BID, Greer Beltran MD, 50 mg at 02/18/20 2053    docusate sodium (COLACE) capsule 100 mg, 100 mg, Oral, BID PRN, Greer Beltran MD    EPINEPHrine PF (ADRENALIN) 1 mg/mL injection 0 15 mg, 0 15 mg, Intramuscular, Once PRN, Greer Beltran MD    fluticasone-vilanterol (BREO ELLIPTA) 200-25 MCG/INH inhaler 1 puff, 1 puff, Inhalation, Daily, Greer Beltran MD, 1 puff at 02/18/20 0825    ketotifen (ZADITOR) 0 025 % ophthalmic solution 1 drop, 1 drop, Right Eye, BID PRN, Greer Beltran MD    levothyroxine tablet 125 mcg, 125 mcg, Oral, Early Morning, Greer Beltran MD, 125 mcg at 02/19/20 0600    magnesium hydroxide (MILK OF MAGNESIA) 400 mg/5 mL oral suspension 30 mL, 30 mL, Oral, Daily PRN, Greer Beltran MD    montelukast (SINGULAIR) tablet 10 mg, 10 mg, Oral, HS, Greer Beltran MD, 10 mg at 01/16/20 2106    OLANZapine (ZyPREXA) IM injection 5 mg, 5 mg, Intramuscular, Q8H PRN, Greer Beltran MD    OLANZapine (ZyPREXA) tablet 5 mg, 5 mg, Oral, Q8H PRN, Greer Beltran MD    ondansetron (ZOFRAN-ODT) dispersible tablet 4 mg, 4 mg, Oral, Q6H PRN, Greer Beltran MD, 4 mg at 12/09/19 1757    pantoprazole (PROTONIX) EC tablet 40 mg, 40 mg, Oral, Early Morning, Greer Beltran MD, 40 mg at 02/19/20 0600    polyethylene glycol (MIRALAX) packet 17 g, 17 g, Oral, Daily PRN, Greer Beltran MD    polyvinyl alcohol (LIQUIFILM TEARS) 1 4 % ophthalmic solution 1 drop, 1 drop, Both Eyes, Q3H PRN, Greer Beltran MD    sertraline (ZOLOFT) tablet 175 mg, 175 mg, Oral, Daily, Greer Beltran MD, 175 mg at 02/19/20 0815    sucralfate (CARAFATE) oral suspension 1,000 mg, 1,000 mg, Oral, BID, Cat Torres MD, 1,000 mg at 02/19/20 0600    theophylline (JEF-24) 24 hr capsule 200 mg, 200 mg, Oral, Daily, Tree Madden MD, 200 mg at 02/19/20 0815    tiotropium (SPIRIVA) capsule for inhaler 18 mcg, 18 mcg, Inhalation, Daily, Tree Madden MD, 18 mcg at 02/18/20 2522    traZODone (DESYREL) tablet 25 mg, 25 mg, Oral, HS PRN, Tree Madden MD  Justification if on more than two antipsychotics: not applicable  Side effects if any: none    Risks , benefits, side effects and precautions of medications discussed with patient and reminded patient to let us know any problems with medications     Objective:     Vital Signs:  Vitals:    02/18/20 2000 02/18/20 2125 02/19/20 0730 02/19/20 0732   BP: 118/56  109/73 104/58   BP Location: Left arm  Left arm Left arm   Pulse: 76 92 84 70   Resp: 16  18    Temp: (!) 96 7 °F (35 9 °C)  (!) 97 3 °F (36 3 °C)    TempSrc: Temporal  Temporal    SpO2: 90% 91%     Weight:       Height:         Appearance:  age appropriate and older than stated age better groomed but laying in bed   Behavior:  deviant, evasive and guarded suspicious at times   Speech:  delayed, increased latency of response and tangential    Mood:  anxious, euphoric and irritable    Affect:  increased in intensity, increased in range, mood-congruent and redirectable   Thought Process:  circumstantial, concrete, goal directed, illogical and perserverative with stilted speech   Thought Content:  delusions  grandiose and persecutory no phobias or compulsions or distorted perception of body weight, No current s/h thoughts intent or plans, no overt delusions but does appear paranoid at times about staff tampering with her oxygen concentrator   Perceptual Disturbances: None    Risk Potential: Inability to care for herself and refusal to take showers regularly   Sensorium:  person, place, time/date, situation, day of week, month of year, year and time   Cognition:  grossly intact no deficit in recent o r remote memory, aware of current events,    Consciousness:  alert and awake    Attention: improving   Intellect: average   Insight:  limited   Judgment: fair       Motor Activity: no abnormal movements         Recent Labs:  Results Reviewed     None          I/O Past 24 hours:  No intake/output data recorded  No intake/output data recorded  Assessment / Plan:     Schizoaffective disorder, bipolar type ([facility] [address] )  Overall status:  I improving    Reason for continued inpatient care: to assess ability to maintain improvement by attending to ADLs and taking showers regular and see how she does on outing with family after another outing with staff escort next week  Acceptance by patient: accepting now  Hopefulness in recovery: living at the [facility] soon  Understanding of medications :  yes  Involved in reintegration process: attending groups and has off grounds and off unit privileges   Trusting in relatoinship with psychiatrist:  Preeti Guardado now    Recommended Treatment:    Medication changes:  1) none today    Non-pharmacological treatments  1) Continue with individual therapy, group therapy, milieu therapy and occupational therapy using recovery principles and psycho-education about accepting illness and need for treatment   2) encourage to take showers twice a week and cooperate with treatment and adl skills as per her behav  plan  3) reschedule another pass with staff to community again before pass with sister to see if she would fully comply with assigned outing   Safety  1) Safety/communication plan established targeting dynamic risk factors above  Discharge Plan to a [facility] at [address] / Coordination of Care    Total floor / unit time spent today 15 minutes  Greater than 50% of total time was spent with the patient and / or family counseling and / or coordination of care    Receptive to listening and learning coping skills for management of symptoms and attending to ADLs      Patient's Rights, confidentiality and exceptions to confidentiality, use of automated medical record, [facility] Patrick staff access to medical record, and consent to treatment reviewed      Greer Beltran MD

## 2020-02-19 NOTE — TREATMENT TEAM
02/19/20 1100   Activity/Group Checklist   Group   (IMR/Preparing for Discharge )   Attendance Attended   Attendance Duration (min) 46-60   Interactions Interacted appropriately   Affect/Mood Appropriate;Normal range   Goals Achieved Able to engage in interactions; Able to listen to others

## 2020-02-19 NOTE — PROGRESS NOTES
02/19/20 0900   Team Meeting   Meeting Type Daily Rounds   Team Members Present   Team Members Present Physician;Nurse;; Other (Discipline and Name)   Physician Team Member Dr Jackie Hernadez Team Member Jesus Angel RN   Care Management Team Member Brenda Torre   Other (Discipline and Name) Karissa Carrion LCSW; SHANIKA Hadley     Patient was seen by cardiology yesterday  Only attended treatment team yesterday; no groups  Good appetite  Slept

## 2020-02-20 NOTE — PLAN OF CARE
Problem: PAIN - ADULT  Goal: Verbalizes/displays adequate comfort level or baseline comfort level  Description  Interventions:  - Encourage patient to monitor pain and request assistance  - Assess pain using appropriate pain scale  - Administer analgesics based on type and severity of pain and evaluate response  - Implement non-pharmacological measures as appropriate and evaluate response  - Consider cultural and social influences on pain and pain management  - Notify physician/advanced practitioner if interventions unsuccessful or patient reports new pain  Outcome: Progressing     Problem: SAFETY ADULT  Goal: Patient will remain free of falls  Description  INTERVENTIONS:  - Assess patient frequently for physical needs  -  Identify cognitive and physical deficits and behaviors that affect risk of falls    -  [place] fall precautions as indicated by assessment   - Educate patient/family on patient safety including physical limitations  - Instruct patient to call for assistance with activity based on assessment  - Modify environment to reduce risk of injury  - Consider OT/PT consult to assist with strengthening/mobility  Outcome: Progressing     Problem: SLEEP DISTURBANCE  Goal: Will exhibit normal sleeping pattern  Description  Interventions:  -  Assess the patients sleep pattern, noting recent changes  - Administer medication as ordered  - Decrease environmental stimuli, including noise, as appropriate during the night  - Encourage the patient to actively participate in unit groups and or exercise during the day to enhance ability to achieve adequate sleep at night  - Assess the patient, in the morning, encouraging a description of sleep experience  Outcome: Progressing    Maggie maintained on ongoing fall and SAFE precaution  Marica Bence in bed with eyes closed, breath even and unlabored   On O2 with humidifier @1L/m via nasal cannula   Q 15 minutes rounding   No somatic complaint overnight  No PRN needed for sleep aid   No indication of pain or discomfort   No respiratory distress   Will continue to monitor

## 2020-02-20 NOTE — PROGRESS NOTES
[address] Barc[address]eli did take part in PM Group  Used the [employer] & achieved volumes of 1100-1250ml  Had an HS snack, came up on own for HS medicine except the Singulair  Wearing now her QHS humidified nasal O2 @ 1L for bed  Still some suspicious that the humidification bottle is/has been tampered with

## 2020-02-20 NOTE — ASSESSMENT & PLAN NOTE
Patient was isolated to her room and laying on bed most of the day but did attend some of the groups and refused to take showers claiming he was she was tired even though yesterday she was supposed to take a shower  She is still somewhat disheveled poor greatly groomed wearing the same clothing  She is still suspicious and believes that staff are tampering with the spirometer as well as the oxygen concentrator  He she is still anxious and has psychosomatic symptoms claiming she cannot breathe or swallow and is afraid of dying from talking  However she has been eating her meals and has not lost much weight  Compliant with medications  He no side effects reported  Anticipating to go on another pass next week to a park following which she could possibly go with her sister on another pass    Still with the stilted speech as if talking in a court of law    Current medications:    Current Facility-Administered Medications:     acetaminophen (TYLENOL) tablet 325 mg, 325 mg, Oral, Q6H PRN, Shi Pham MD    acetaminophen (TYLENOL) tablet 650 mg, 650 mg, Oral, Q6H PRN, Shi Pham MD    acetaminophen (TYLENOL) tablet 650 mg, 650 mg, Oral, Q8H PRN, Shi Pham MD    albuterol (PROVENTIL HFA,VENTOLIN HFA) inhaler 2 puff, 2 puff, Inhalation, Q4H PRN, Shi Pham MD, 2 puff at 10/11/19 0424    aluminum-magnesium hydroxide-simethicone (MYLANTA) 200-200-20 mg/5 mL oral suspension 15 mL, 15 mL, Oral, Q4H PRN, Shi Pham MD, 15 mL at 01/26/20 1021    ammonium lactate (LAC-HYDRIN) 12 % lotion 1 application, 1 application, Topical, BID PRN, Shi Pham MD    bacitracin topical ointment 1 small application, 1 small application, Topical, Daily, ABILIO Holguin, 1 small application at 04/40/12 0815    benzonatate (TESSALON PERLES) capsule 100 mg, 100 mg, Oral, TID PRN, Shi Pham MD    benztropine (COGENTIN) injection 1 mg, 1 mg, Intramuscular, Q8H PRN, Shi Pham MD    carbamide peroxide (DEBROX) 6 5 % otic solution 5 drop, 5 drop, Left Ear, BID PRN, Beryl Rodriguez MD    cloZAPine (CLOZARIL) tablet 25 mg, 25 mg, Oral, BID, Roberto Shankar MD, 25 mg at 02/19/20 2125    cloZAPine (CLOZARIL) tablet 50 mg, 50 mg, Oral, BID, Roberto Shankar MD, 50 mg at 02/19/20 2125    docusate sodium (COLACE) capsule 100 mg, 100 mg, Oral, BID PRN, Roberto Shankar MD    EPINEPHrine PF (ADRENALIN) 1 mg/mL injection 0 15 mg, 0 15 mg, Intramuscular, Once PRN, Roberto Shankar MD    fluticasone-vilanterol (BREO ELLIPTA) 200-25 MCG/INH inhaler 1 puff, 1 puff, Inhalation, Daily, Roberto Shankar MD, 1 puff at 02/19/20 0900    ketotifen (ZADITOR) 0 025 % ophthalmic solution 1 drop, 1 drop, Right Eye, BID PRN, Roberto Shankar MD    levothyroxine tablet 125 mcg, 125 mcg, Oral, Early Morning, Roberto Shankar MD, 125 mcg at 02/20/20 2392    magnesium hydroxide (MILK OF MAGNESIA) 400 mg/5 mL oral suspension 30 mL, 30 mL, Oral, Daily PRN, Roberto Shankar MD    montelukast (SINGULAIR) tablet 10 mg, 10 mg, Oral, HS, Roberto Shankar MD, 10 mg at 01/16/20 2106    OLANZapine (ZyPREXA) IM injection 5 mg, 5 mg, Intramuscular, Q8H PRN, Roberto Shankar MD    OLANZapine (ZyPREXA) tablet 5 mg, 5 mg, Oral, Q8H PRN, Roberto Shankar MD    ondansetron (ZOFRAN-ODT) dispersible tablet 4 mg, 4 mg, Oral, Q6H PRN, Roberto Shankar MD, 4 mg at 12/09/19 1757    pantoprazole (PROTONIX) EC tablet 40 mg, 40 mg, Oral, Early Morning, Roberto Shankar MD, 40 mg at 02/20/20 5190    polyethylene glycol (MIRALAX) packet 17 g, 17 g, Oral, Daily PRN, Roberto Shankar MD    polyvinyl alcohol (LIQUIFILM TEARS) 1 4 % ophthalmic solution 1 drop, 1 drop, Both Eyes, Q3H PRN, Roberto Shankar MD    sertraline (ZOLOFT) tablet 175 mg, 175 mg, Oral, Daily, Roberto Shankar MD, 175 mg at 02/19/20 0815    sucralfate (CARAFATE) oral suspension 1,000 mg, 1,000 mg, Oral, BID, Cat Torres MD, 1,000 mg at 02/20/20 7263    theophylline (JEF-24) 24 hr capsule 200 mg, 200 mg, Oral, Daily, Roberto Shankar MD, 200 mg at 02/19/20 0815   tiotropium (SPIRIVA) capsule for inhaler 18 mcg, 18 mcg, Inhalation, Daily, Isidoro Gonzalez MD, 18 mcg at 02/19/20 0900    traZODone (DESYREL) tablet 25 mg, 25 mg, Oral, HS PRN, Isidoro Gonzalez MD  Justification if on more than two antipsychotics: not applicable  Side effects if any: none    Risks , benefits, side effects and precautions of medications discussed with patient and reminded patient to let us know any problems with medications     Objective:     Vital Signs:  Vitals:    02/19/20 0730 02/19/20 0732 02/19/20 1704 02/19/20 1952   BP: 109/73 104/58 95/52 104/60   BP Location: Left arm Left arm Right arm Left arm   Pulse: 84 70 70 88   Resp: 18  16 16   Temp: (!) 97 3 °F (36 3 °C)  98 1 °F (36 7 °C) 97 8 °F (36 6 °C)   TempSrc: Temporal  Temporal Temporal   SpO2:    98%   Weight:       Height:         Appearance:  age appropriate and older than stated age poorly groomed found sitting in the dining [place] friendly and pleasant today upon approach   Behavior:  deviant, evasive and guarded becoming suspicious at times   Speech:  delayed, increased latency of response and tangential    Mood:  anxious, euphoric and irritable    Affect:  increased in intensity, increased in range, mood-congruent and redirectable   Thought Process:  circumstantial, concrete, goal directed, illogical and perserverative with tendency to have stilted speech   Thought Content:  delusions  grandiose and persecutory no phobias obsessions or compulsions or distorted body perception     No preoccupation with violence  No current suicidal homicidal thoughts intent or plans  No overt delusions but still exhibits some paranoia about staff tampering with her oxygen concentrator and inhalant off and on  Still with psychosomatic symptoms off and on and somewhat attention seeking     Perceptual Disturbances: None    Risk Potential: Inability to care for herself and refusal to take showers regularly   Sensorium:  person, place, time/date, situation, day of week, month of year, year and time   Cognition:  grossly intact aware of current events, no deficit in recent or remote memory, no language deficits   Consciousness:  alert and awake    Attention: Fair   Intellect: Average   Insight:  Limited   Judgment: fair       Motor Activity: no abnormal movements         Recent Labs:  Results Reviewed     None          I/O Past 24 hours:  No intake/output data recorded  No intake/output data recorded  Assessment / Plan:     Schizoaffective disorder, bipolar type ([facility] [address] )  Overall status:  I improving    Reason for continued inpatient care: to assess ability to maintain improvement by attending to ADLs and taking showers regular and see how she does on outing with family after another outing with staff escort next week  Acceptance by patient: accepting now  Hopefulness in recovery: living at the [address] [place] [address]  Understanding of medications :  yes  Involved in reintegration process: attending groups and has off grounds and off unit privileges   Trusting in relatoinship with psychiatrist:  Marta Villatoro now    Recommended Treatment:    Medication changes:  1) none today    Non-pharmacological treatments  1) Continue with individual therapy, group therapy, milieu therapy and occupational therapy using recovery principles and psycho-education about accepting illness and need for treatment   2) encourage to take showers twice a week and cooperate with treatment and adl skills as per her behav  plan  3) reschedule another pass with staff to community again before pass with sister to see if she would fully comply with assigned outing   Safety  1) Safety/communication plan established targeting dynamic risk factors above  Discharge Plan to a [facility] at [address] / Coordination of Care    Total floor / unit time spent today 15 minutes  Greater than 50% of total time was spent with the patient and / or family counseling and / or coordination of care    Receptive to listening and learning coping skills for management of symptoms and attending to ADLs  Patient's Rights, confidentiality and exceptions to confidentiality, use of automated medical record, Jeb Patrick staff access to medical record, and consent to treatment reviewed      Faheem Daniels MD

## 2020-02-20 NOTE — PLAN OF CARE
Problem: Alteration in Thoughts and Perception  Goal: Recognize dysfunctional thoughts, communicate reality-based thoughts at the time of discharge  Description  Interventions:  - Provide medication and psycho-education to assist patient in compliance and developing insight into his/her illness   Outcome: Not Progressing    Individual has been in bed most of the shift, but is visible at times for select structured groups/ activities  She continues to express distorted thinking, suspicious that staff tampers with her oxygen humidifier and at times feels uncomfortable in her surroundings  Compliant with medications  She continues with poor food intake at times, which is also related to distorted thoughts/ fear of choking  Able to express needs to staff  Will continue to monitor

## 2020-02-20 NOTE — PROGRESS NOTES
Progress Note - Brandon Yang 1955, 60 y o  female MRN: [id number]    Unit/Bed#: [facility] 110-02 Encounter: [id number]    Primary Care Provider: Jordan Chavez PA-C   Date and time admitted to hospital: 7/23/2019  5:30 PM        * Schizoaffective disorder, bipolar type [facility])  Assessment & Plan  Patient was isolated to her room and laying on bed most of the day but did attend some of the groups and refused to take showers claiming he was she was tired even though yesterday she was supposed to take a shower  She is still somewhat disheveled poor greatly groomed wearing the same clothing  She is still suspicious and believes that staff are tampering with the spirometer as well as the oxygen concentrator  He she is still anxious and has psychosomatic symptoms claiming she cannot breathe or swallow and is afraid of dying from talking  However she has been eating her meals and has not lost much weight  Compliant with medications  He no side effects reported  Anticipating to go on another pass next week to a park following which she could possibly go with her sister on another pass    Still with the stilted speech as if talking in a court of law    Current medications:    Current Facility-Administered Medications:     acetaminophen (TYLENOL) tablet 325 mg, 325 mg, Oral, Q6H PRN, Prakash Brewster MD    acetaminophen (TYLENOL) tablet 650 mg, 650 mg, Oral, Q6H PRN, Prakash Brewster MD    acetaminophen (TYLENOL) tablet 650 mg, 650 mg, Oral, Q8H PRN, Prakash Brewster MD    albuterol (PROVENTIL HFA,VENTOLIN HFA) inhaler 2 puff, 2 puff, Inhalation, Q4H PRN, Prakash Brewster MD, 2 puff at 10/11/19 0424    aluminum-magnesium hydroxide-simethicone (MYLANTA) 200-200-20 mg/5 mL oral suspension 15 mL, 15 mL, Oral, Q4H PRN, Prakash Brewster MD, 15 mL at 01/26/20 1021    ammonium lactate (LAC-HYDRIN) 12 % lotion 1 application, 1 application, Topical, BID PRN, Prakash Brewster MD    bacitracin topical ointment 1 small application, 1 small application, Topical, Daily, ABILIO Peck, 1 small application at 57/01/09 0815    benzonatate (TESSALON PERLES) capsule 100 mg, 100 mg, Oral, TID PRN, Zac Sierra MD    benztropine (COGENTIN) injection 1 mg, 1 mg, Intramuscular, Q8H PRN, Zac Sierra MD    carbamide peroxide (DEBROX) 6 5 % otic solution 5 drop, 5 drop, Left Ear, BID PRN, Santiago Sanchez MD    cloZAPine (CLOZARIL) tablet 25 mg, 25 mg, Oral, BID, Zac Sierra MD, 25 mg at 02/19/20 2125    cloZAPine (CLOZARIL) tablet 50 mg, 50 mg, Oral, BID, Zac Sierra MD, 50 mg at 02/19/20 2125    docusate sodium (COLACE) capsule 100 mg, 100 mg, Oral, BID PRN, Zac Sierra MD    EPINEPHrine PF (ADRENALIN) 1 mg/mL injection 0 15 mg, 0 15 mg, Intramuscular, Once PRN, Zac Sierra MD    fluticasone-vilanterol (BREO ELLIPTA) 200-25 MCG/INH inhaler 1 puff, 1 puff, Inhalation, Daily, Zac Sierra MD, 1 puff at 02/19/20 0900    ketotifen (ZADITOR) 0 025 % ophthalmic solution 1 drop, 1 drop, Right Eye, BID PRN, Zac Sierra MD    levothyroxine tablet 125 mcg, 125 mcg, Oral, Early Morning, Zac Sierra MD, 125 mcg at 02/20/20 4028    magnesium hydroxide (MILK OF MAGNESIA) 400 mg/5 mL oral suspension 30 mL, 30 mL, Oral, Daily PRN, Zac Sierra MD    montelukast (SINGULAIR) tablet 10 mg, 10 mg, Oral, HS, Zac Sierra MD, 10 mg at 01/16/20 2106    OLANZapine (ZyPREXA) IM injection 5 mg, 5 mg, Intramuscular, Q8H PRN, Zac Sierra MD    OLANZapine (ZyPREXA) tablet 5 mg, 5 mg, Oral, Q8H PRN, Zac Sierra MD    ondansetron (ZOFRAN-ODT) dispersible tablet 4 mg, 4 mg, Oral, Q6H PRN, Zac Sierra MD, 4 mg at 12/09/19 1757    pantoprazole (PROTONIX) EC tablet 40 mg, 40 mg, Oral, Early Morning, Zac Sierra MD, 40 mg at 02/20/20 5428    polyethylene glycol (MIRALAX) packet 17 g, 17 g, Oral, Daily PRN, Zac Sierra MD    polyvinyl alcohol (LIQUIFILM TEARS) 1 4 % ophthalmic solution 1 drop, 1 drop, Both Eyes, Q3H PRN, Zac Sierra MD    sertraline (ZOLOFT) tablet 175 mg, 175 mg, Oral, Daily, Zander Yanez MD, 175 mg at 02/19/20 0815    sucralfate (CARAFATE) oral suspension 1,000 mg, 1,000 mg, Oral, BID, Tomás Layne MD, 1,000 mg at 02/20/20 8596    theophylline (JEF-24) 24 hr capsule 200 mg, 200 mg, Oral, Daily, Zander Yanez MD, 200 mg at 02/19/20 0815    tiotropium [facility]) capsule for inhaler 18 mcg, 18 mcg, Inhalation, Daily, Zander Yanez MD, 18 mcg at 02/19/20 0900    traZODone (DESYREL) tablet 25 mg, 25 mg, Oral, HS PRN, Zander Yanez MD  Justification if on more than two antipsychotics: not applicable  Side effects if any: none    Risks , benefits, side effects and precautions of medications discussed with patient and reminded patient to let us know any problems with medications     Objective:     Vital Signs:  Vitals:    02/19/20 0730 02/19/20 0732 02/19/20 1704 02/19/20 1952   BP: 109/73 104/58 95/52 104/60   BP Location: Left arm Left arm Right arm Left arm   Pulse: 84 70 70 88   Resp: 18  16 16   Temp: (!) 97 3 °F (36 3 °C)  98 1 °F (36 7 °C) 97 8 °F (36 6 °C)   TempSrc: Temporal  Temporal Temporal   SpO2:    98%   Weight:       Height:         Appearance:  age appropriate and older than stated age poorly groomed found sitting in the dining [place] friendly and pleasant today upon approach   Behavior:  deviant, evasive and guarded becoming suspicious at times   Speech:  delayed, increased latency of response and tangential    Mood:  anxious, euphoric and irritable    Affect:  increased in intensity, increased in range, mood-congruent and redirectable   Thought Process:  circumstantial, concrete, goal directed, illogical and perserverative with tendency to have stilted speech   Thought Content:  delusions  grandiose and persecutory no phobias obsessions or compulsions or distorted body perception     No preoccupation with violence  No current suicidal homicidal thoughts intent or plans    No overt delusions but still exhibits some paranoia about staff tampering with her oxygen concentrator and inhalant off and on  Still with psychosomatic symptoms off and on and somewhat attention seeking  Perceptual Disturbances: None    Risk Potential: Inability to care for herself and refusal to take showers regularly   Sensorium:  person, place, time/date, situation, day of week, month of year, year and time   Cognition:  grossly intact aware of current events, no deficit in recent or remote memory, no language deficits   Consciousness:  alert and awake    Attention: Fair   Intellect: Average   Insight:  Limited   Judgment: fair       Motor Activity: no abnormal movements         Recent Labs:  Results Reviewed     None          I/O Past 24 hours:  No intake/output data recorded  No intake/output data recorded  Assessment / Plan:     Schizoaffective disorder, bipolar type ([facility] [address] )  Overall status:  I improving    Reason for continued inpatient care: to assess ability to maintain improvement by attending to ADLs and taking showers regular and see how she does on outing with family after another outing with staff escort next week  Acceptance by patient: accepting now  Hopefulness in recovery: living at the [facility] soon  Understanding of medications :  yes  Involved in reintegration process: attending groups and has off grounds and off unit privileges   Trusting in relatoinship with psychiatrist:  Emeterio Caraballo now    Recommended Treatment:    Medication changes:  1) none today    Non-pharmacological treatments  1) Continue with individual therapy, group therapy, milieu therapy and occupational therapy using recovery principles and psycho-education about accepting illness and need for treatment     2) encourage to take showers twice a week and cooperate with treatment and adl skills as per her behav  plan  3) reschedule another pass with staff to community again before pass with sister to see if she would fully comply with assigned outing   Safety  1) Safety/communication plan established targeting dynamic risk factors above  Discharge Plan to a [facility] at [address] / Coordination of Care    Total floor / unit time spent today 15 minutes  Greater than 50% of total time was spent with the patient and / or family counseling and / or coordination of care  Receptive to listening and learning coping skills for management of symptoms and attending to ADLs  Patient's Rights, confidentiality and exceptions to confidentiality, use of automated medical record, [facility] staff access to medical record, and consent to treatment reviewed      Amina Aparicio MD

## 2020-02-20 NOTE — TREATMENT TEAM
02/20/20 1100   Activity/Group Checklist   Group   (IMR/Barriers to [employer] )   Attendance Attended   Attendance Duration (min) 46-60   Interactions Interacted appropriately   Affect/Mood Appropriate;Normal range   Goals Achieved Identified feelings; Able to listen to others; Able to engage in interactions; Able to self-disclose

## 2020-02-20 NOTE — TREATMENT TEAM
02/19/20 1130   Activity/Group Checklist   Group   ([employer] [facility] )   Attendance Did not attend   Attendance Duration (min) Greater than 60   Affect/Mood IRMA

## 2020-02-20 NOTE — PROGRESS NOTES
02/20/20 0900   Team Meeting   Meeting Type Daily Rounds   Team Members Present   Team Members Present Physician;Nurse;; Other (Discipline and Name)   Physician Team Member Dr Marino Vail, RN   Care Management Team Member Brenda Vizcarra   Other (Discipline and Name) Moisés Greene, JONATHON; Dominick Kuhn, SHANIKA     Patient attended [facility]  She is suspicious of staff tampering with her O2  Slept

## 2020-02-20 NOTE — TREATMENT TEAM
02/19/20 1400   Activity/Group Checklist   Group   ([facility] )   Attendance Did not attend   Attendance Duration (min) 46-60   Affect/Mood IRMA

## 2020-02-20 NOTE — PLAN OF CARE
Problem: Alteration in Thoughts and Perception  Goal: Verbalize thoughts and feelings  Description  Interventions:  - Promote a nonjudgmental and trusting relationship with the patient through active listening and therapeutic communication  - Assess patient's level of functioning, behavior and potential for risk  - Engage patient in 1 on 1 interactions for a minimum of 15 minutes each session  - Encourage patient to express fears, feelings, frustrations, and discuss symptoms    - [place] patient to reality, help patient recognize reality-based thinking   - Administer medications as ordered and assess for potential side effects  - Provide the patient education related to the signs and symptoms of the illness and desired effects of prescribed medications  Outcome: Progressing  Goal: Agree to be compliant with medication regime, as prescribed and report medication side effects  Description  Interventions:  - Offer appropriate PRN medication and supervise ingestion; conduct aims, as needed   Outcome: Progressing     Problem: Depression  Goal: Refrain from isolation  Description  Interventions:  - Develop a trusting relationship   - Encourage socialization   Outcome: Not Progressing     Problem: Nutrition/Hydration-ADULT  Goal: Nutrient/Hydration intake appropriate for improving, restoring or maintaining nutritional needs  Description  Monitor and assess patient's nutrition/hydration status for malnutrition  Collaborate with interdisciplinary team and initiate plan and interventions as ordered  Monitor patient's weight and dietary intake as ordered or per policy  Utilize nutrition screening tool and intervene as necessary  Determine patient's food preferences and provide high-protein, high-caloric foods as appropriate       INTERVENTIONS:  - Monitor oral intake, urinary output, labs, and treatment plans  - Assess nutrition and hydration status and recommend course of action  - Evaluate amount of meals eaten  - Assist patient with eating if necessary   - Allow adequate time for meals  - Recommend/ encourage appropriate diets, oral nutritional supplements, and vitamin/mineral supplements  - Order, calculate, and assess calorie counts as needed  - Recommend, monitor, and adjust tube feedings and TPN/PPN based on assessed needs  - Assess need for intravenous fluids  - Provide specific nutrition/hydration education as appropriate  - Include patient/family/caregiver in decisions related to nutrition  Outcome: Jannette Ospina is fatigued today, asleep in bed in free time  Needed prompting for meal  Not happy w/meal as it was a house tray as was asleep yesterday when menus done  Did eat 25% (rice & dessert) plus an Ensure  Came up on own for supper medicine  Did welcome T's offer to trim her fingernails for her  Otherwise isolative, withdrawn

## 2020-02-21 NOTE — PROGRESS NOTES
Maggie maintained on ongoing fall and SAFE precaution  Miriam Curl in bed with eyes closed, breath even and unlabored   On O2 with humidifier @1L/m via nasal cannula   Q 15 minutes rounding   No somatic complaint overnight  No PRN needed for sleep aid   No indication of pain or discomfort   No respiratory distress   Will continue to monitor

## 2020-02-21 NOTE — PROGRESS NOTES
Progress Note - Jose Ramon Roblero 1955, 60 y o  female MRN: [id number]    [facility] Encounter: [id number]    Primary Care Provider: Richar Bass PA-C   Date and time admitted to hospital: 7/23/2019  5:30 PM        * Schizoaffective disorder, bipolar type [facility])  Assessment & Plan  Patient again did not take a shower yesterday even though she had promised she would as she claimed it was too late for her to take a shower around 5:00 p m claiming that she does not like taking showers after she eats  When a female MHT was available states she will try to get 1 today  She did the washer clothing with help from staff and is still appearing somewhat disheveled poorly groomed with uncombed hair preferring to lay back on bed most of the time but does seem to attend some of the groups  She is still accusing staff of tampering with the oxygen concentrator off and on  She continues to have psychosomatic symptoms and fears that she is going to die from talking and that she won be able to breathe or eat regular food  She no longer voices any overt delusions about having a micro chip underneath the lipoma on her hand  She still has a tendency to talk in a stilted manner and has lot of excuses for her passive-aggressive behaviors of not taking showers as required  She is hoping to go on another therapeutic with staff next week following which she could possibly go on a pass with her sister to the community  She is eating fairly well and has not lost much weight and is also sleeping well  No side effects reported    Cardiology consultation from 02/18/2020 and appreciated and he has noted that her corrected actual QTC is 430 and the machine probably read it wrong    Current medications:    Current Facility-Administered Medications:     acetaminophen (TYLENOL) tablet 325 mg, 325 mg, Oral, Q6H PRN, Roberto Shankar MD    acetaminophen (TYLENOL) tablet 650 mg, 650 mg, Oral, Q6H PRN, MD Jose Stokes acetaminophen (TYLENOL) tablet 650 mg, 650 mg, Oral, Q8H PRN, Dalton Garner MD    albuterol (PROVENTIL HFA,VENTOLIN HFA) inhaler 2 puff, 2 puff, Inhalation, Q4H PRN, Dalton Garner MD, 2 puff at 10/11/19 0424    aluminum-magnesium hydroxide-simethicone (MYLANTA) 200-200-20 mg/5 mL oral suspension 15 mL, 15 mL, Oral, Q4H PRN, Dalton Garner MD, 15 mL at 01/26/20 1021    ammonium lactate (LAC-HYDRIN) 12 % lotion 1 application, 1 application, Topical, BID PRN, Dalton Garner MD    bacitracin topical ointment 1 small application, 1 small application, Topical, Daily, Arnie Liner, CRNP, 1 small application at 55/39/08 7674    benzonatate (TESSALON PERLES) capsule 100 mg, 100 mg, Oral, TID PRN, Dalton Garner MD    benztropine (COGENTIN) injection 1 mg, 1 mg, Intramuscular, Q8H PRN, Dalton Garner MD    carbamide peroxide (DEBROX) 6 5 % otic solution 5 drop, 5 drop, Left Ear, BID PRN, Larrakarissa Robbins MD    cloZAPine (CLOZARIL) tablet 25 mg, 25 mg, Oral, BID, Dalton Garner MD, 25 mg at 02/20/20 2132    cloZAPine (CLOZARIL) tablet 50 mg, 50 mg, Oral, BID, Dalton Garner MD, 50 mg at 02/20/20 2132    docusate sodium (COLACE) capsule 100 mg, 100 mg, Oral, BID PRN, Dalton Garner MD    EPINEPHrine PF (ADRENALIN) 1 mg/mL injection 0 15 mg, 0 15 mg, Intramuscular, Once PRN, Dalton Garner MD    fluticasone-vilanterol (BREO ELLIPTA) 200-25 MCG/INH inhaler 1 puff, 1 puff, Inhalation, Daily, Dalton Garner MD, 1 puff at 02/20/20 0820    ketotifen (ZADITOR) 0 025 % ophthalmic solution 1 drop, 1 drop, Right Eye, BID PRN, Dalton Garner MD    levothyroxine tablet 125 mcg, 125 mcg, Oral, Early Morning, Dalton Garner MD, 125 mcg at 02/21/20 0605    magnesium hydroxide (MILK OF MAGNESIA) 400 mg/5 mL oral suspension 30 mL, 30 mL, Oral, Daily PRN, Dalton Garner MD    montelukast (SINGULAIR) tablet 10 mg, 10 mg, Oral, HS, Dalton Garner MD, 10 mg at 01/16/20 2106    OLANZapine (ZyPREXA) IM injection 5 mg, 5 mg, Intramuscular, Q8H PRN, MD Amilcar Roper OLANZapine (ZyPREXA) tablet 5 mg, 5 mg, Oral, Q8H PRN, Shilpa Trujillo MD    ondansetron (ZOFRAN-ODT) dispersible tablet 4 mg, 4 mg, Oral, Q6H PRN, Shilpa Trujillo MD, 4 mg at 12/09/19 1757    pantoprazole (PROTONIX) EC tablet 40 mg, 40 mg, Oral, Early Morning, Shilpa Trujillo MD, 40 mg at 02/21/20 0605    polyethylene glycol (MIRALAX) packet 17 g, 17 g, Oral, Daily PRN, Shilpa Trujillo MD    polyvinyl alcohol (LIQUIFILM TEARS) 1 4 % ophthalmic solution 1 drop, 1 drop, Both Eyes, Q3H PRN, Shilpa Trujillo MD    sertraline (ZOLOFT) tablet 175 mg, 175 mg, Oral, Daily, Shilpa Trujillo MD, 175 mg at 02/20/20 0820    sucralfate (CARAFATE) oral suspension 1,000 mg, 1,000 mg, Oral, BID, Angelica Nageotte, MD, 1,000 mg at 02/21/20 0605    theophylline (JEF-24) 24 hr capsule 200 mg, 200 mg, Oral, Daily, Shilpa Trujillo MD, 200 mg at 02/20/20 0820    tiotropium [facility]) capsule for inhaler 18 mcg, 18 mcg, Inhalation, Daily, Shilpa Trujillo MD, 18 mcg at 02/20/20 0820    traZODone (DESYREL) tablet 25 mg, 25 mg, Oral, HS PRN, Shilpa Trujillo MD  Justification if on more than two antipsychotics: not applicable  Side effects if any: none    Risks , benefits, side effects and precautions of medications discussed with patient and reminded patient to let us know any problems with medications     Objective:     Vital Signs:  Vitals:    02/20/20 0700 02/20/20 0705 02/20/20 1555 02/1955   BP: 121/77 96/56 115/67 117/68   BP Location: Left arm Left arm Left arm Left arm   Pulse: 91 71 87 85   Resp: 18 18 17 18   Temp: (!) 96 6 °F (35 9 °C)  (!) 97 1 °F (36 2 °C) (!) 97 °F (36 1 °C)   TempSrc: Temporal  Temporal Temporal   SpO2:   93% 91%   Weight:       Height:         Appearance:  age appropriate and older than stated age poorly groomed fine laying in bed friendly pleasant but still somewhat suspicious   Behavior:  deviant, evasive and guarded becoming suspicious   Speech:  delayed, increased latency of response and tangential    Mood:  anxious, euphoric and irritable    Affect:  increased in intensity, increased in range, mood-congruent and redirectable   Thought Process:  circumstantial, concrete, goal directed, illogical and perserverative with tendency to have stilted speech   Thought Content:  delusions  grandiose and persecutory no current suicidal homicidal thoughts and under plans  No phobias obsessions or distorted body perception is a  No overt delusions but still appears to harbor some paranoia about people tampering with her oxygen concentrator at night  Still psychosomatic about having difficulty breathing and dying from choking on food   Perceptual Disturbances: None    Risk Potential: Inability to care for herself and refusal to take showers regularly   Sensorium:  person, place, time/date, situation, day of week, month of year, year and time   Cognition:  grossly intact aware of current events, do not deficit in recent or remote memory, no language deficits   Consciousness:  alert and awake    Attention: Fair   Intellect: Average   Insight:  Limited   Judgment: fair       Motor Activity: no abnormal movements         Recent Labs:  Results Reviewed     None          I/O Past 24 hours:  No intake/output data recorded  No intake/output data recorded          Assessment / Plan:     [facility]ffective [facility]  Overall status:  I improving    Reason for continued inpatient care: to assess ability to maintain improvement by attending to ADLs and taking showers regular and see how she does on outing with family after another outing with staff escort next week  Acceptance by patient: accepting now  Hopefulness in recovery: living at the [facility]  Understanding of medications :  yes  Involved in reintegration process: attending groups and has off grounds and off unit privileges   Trusting in relatoinship with psychiatrist:  Subha Soto now  Overall status :  No changes  Recommended Treatment:    Medication changes:  1) none today    Non-pharmacological treatments  1) Continue with individual therapy, group therapy, milieu therapy and occupational therapy using recovery principles and psycho-education about accepting illness and need for treatment   2) encourage to take showers twice a week and cooperate with treatment and adl skills as per her behav  plan  3) reschedule another pass with staff to community again before pass with sister to see if she would fully comply with assigned outing   Safety  1) Safety/communication plan established targeting dynamic risk factors above  Discharge Plan to a [facility] at [address] / Coordination of Care    Total floor / unit time spent today 15 minutes  Greater than 50% of total time was spent with the patient and / or family counseling and / or coordination of care  Receptive to listening and learning coping skills for management of symptoms and attending to ADLs  Patient's Rights, confidentiality and exceptions to confidentiality, use of automated medical record, [facility] to [facility] record, and consent to treatment reviewed      Allie Guzman MD

## 2020-02-21 NOTE — PLAN OF CARE
Problem: Alteration in Thoughts and Perception  Goal: Verbalize thoughts and feelings  Description  Interventions:  - Promote a nonjudgmental and trusting relationship with the patient through active listening and therapeutic communication  - Assess patient's level of functioning, behavior and potential for risk  - Engage patient in 1 on 1 interactions for a minimum of 15 minutes each session  - Encourage patient to express fears, feelings, frustrations, and discuss symptoms    - [place] patient to reality, help patient recognize reality-based thinking   - Administer medications as ordered and assess for potential side effects  - Provide the patient education related to the signs and symptoms of the illness and desired effects of prescribed medications  Outcome: Progressing  Goal: Agree to be compliant with medication regime, as prescribed and report medication side effects  Description  Interventions:  - Offer appropriate PRN medication and supervise ingestion; conduct aims, as needed   Outcome: Progressing  Goal: Attend and participate in unit activities, including therapeutic, recreational, and educational groups  Description  Interventions:  - Provide therapeutic and educational activities daily, encourage attendance and participation, and document same in the medical record     CERTIFIED PEER SPECIALIST INTERVENTIONS:    Complete peer assessment with patient to assess their needs and identify their goals to complete while in the recovery program as well as once discharged into the community  Patient will complete WRAP Plan, Crisis Plan and 5 Life Domains  Patient will attend 50% of groups offered on the unit  Patient will complete a goal card weekly      Outcome: Progressing  Goal: Complete daily ADLs, including personal hygiene independently, as able  Description  Interventions:  - Observe, teach, and assist patient with ADLS  - Monitor and promote a balance of rest/activity, with adequate nutrition and elimination   Outcome: Not Progressing    Alpha Mixer has been intermittently visible on the unit in between select groups and meals  Compliant with her scheduled medications, ate 50% of both breakfast and lunch  No somatic complaints at this time of documentation  Did not tend to her hygiene this shift  Attended IMR for scheduled programming  Often chooses to retreat to her room in between scheduled activities  Pleasant during interactions with this writer, minimal interactions with peers  Will continue to monitor

## 2020-02-21 NOTE — PROGRESS NOTES
[address] took part in PM Group  She used the Dennoo achieving volumes of 1250ml  Had an HS snack  Wearing now her QHS humidified nasal O2 @ 1L for bed

## 2020-02-21 NOTE — ASSESSMENT & PLAN NOTE
Patient again did not take a shower yesterday even though she had promised she would as she claimed it was too late for her to take a shower around 5:00 p m claiming that she does not like taking showers after she eats  When a female MHT was available states she will try to get 1 today  She did the washer clothing with help from staff and is still appearing somewhat disheveled poorly groomed with uncombed hair preferring to lay back on bed most of the time but does seem to attend some of the groups  She is still accusing staff of tampering with the oxygen concentrator off and on  She continues to have psychosomatic symptoms and fears that she is going to die from talking and that she won be able to breathe or eat regular food  She no longer voices any overt delusions about having a micro chip underneath the lipoma on her hand  She still has a tendency to talk in a stilted manner and has lot of excuses for her passive-aggressive behaviors of not taking showers as required  She is hoping to go on another therapeutic with staff next week following which she could possibly go on a pass with her sister to the community  She is eating fairly well and has not lost much weight and is also sleeping well  No side effects reported    Cardiology consultation from 02/18/2020 and appreciated and he has noted that her corrected actual QTC is 430 and the machine probably read it wrong    Current medications:    Current Facility-Administered Medications:     acetaminophen (TYLENOL) tablet 325 mg, 325 mg, Oral, Q6H PRN, Deedee Muir MD    acetaminophen (TYLENOL) tablet 650 mg, 650 mg, Oral, Q6H PRN, Deedee Muir MD    acetaminophen (TYLENOL) tablet 650 mg, 650 mg, Oral, Q8H PRN, Deedee Muir MD    albuterol (PROVENTIL HFA,VENTOLIN HFA) inhaler 2 puff, 2 puff, Inhalation, Q4H PRN, Deedee Muir MD, 2 puff at 10/11/19 0424    aluminum-magnesium hydroxide-simethicone (MYLANTA) 200-200-20 mg/5 mL oral suspension 15 mL, 15 mL, Oral, Q4H PRN, Mynor Terry MD, 15 mL at 01/26/20 1021    ammonium lactate (LAC-HYDRIN) 12 % lotion 1 application, 1 application, Topical, BID PRN, Mynor Terry MD    bacitracin topical ointment 1 small application, 1 small application, Topical, Daily, Jah Levo, CRNP, 1 small application at 83/38/60 0440    benzonatate (TESSALON PERLES) capsule 100 mg, 100 mg, Oral, TID PRN, Mynor Terry MD    benztropine (COGENTIN) injection 1 mg, 1 mg, Intramuscular, Q8H PRN, Mynor Terry MD    carbamide peroxide (DEBROX) 6 5 % otic solution 5 drop, 5 drop, Left Ear, BID PRN, Shay Mitchell MD    cloZAPine (CLOZARIL) tablet 25 mg, 25 mg, Oral, BID, Mynor Terry MD, 25 mg at 02/20/20 2132    cloZAPine (CLOZARIL) tablet 50 mg, 50 mg, Oral, BID, Mynor Terry MD, 50 mg at 02/20/20 2132    docusate sodium (COLACE) capsule 100 mg, 100 mg, Oral, BID PRN, Mynor Terry MD    EPINEPHrine PF (ADRENALIN) 1 mg/mL injection 0 15 mg, 0 15 mg, Intramuscular, Once PRN, Mynor Terry MD    fluticasone-vilanterol (BREO ELLIPTA) 200-25 MCG/INH inhaler 1 puff, 1 puff, Inhalation, Daily, Mynor Terry MD, 1 puff at 02/20/20 0820    ketotifen (ZADITOR) 0 025 % ophthalmic solution 1 drop, 1 drop, Right Eye, BID PRN, Mynor Terry MD    levothyroxine tablet 125 mcg, 125 mcg, Oral, Early Morning, Mynor Terry MD, 125 mcg at 02/21/20 0605    magnesium hydroxide (MILK OF MAGNESIA) 400 mg/5 mL oral suspension 30 mL, 30 mL, Oral, Daily PRN, Mynor Terry MD    montelukast (SINGULAIR) tablet 10 mg, 10 mg, Oral, HS, Mynor Terry MD, 10 mg at 01/16/20 2106    OLANZapine (ZyPREXA) IM injection 5 mg, 5 mg, Intramuscular, Q8H PRN, Mynor Terry MD    OLANZapine (ZyPREXA) tablet 5 mg, 5 mg, Oral, Q8H PRN, Mynor Terry MD    ondansetron (ZOFRAN-ODT) dispersible tablet 4 mg, 4 mg, Oral, Q6H PRN, Mynor Terry MD, 4 mg at 12/09/19 7489    pantoprazole (PROTONIX) EC tablet 40 mg, 40 mg, Oral, Early Morning, Mynor Terry MD, 40 mg at 02/21/20 8752   polyethylene glycol (MIRALAX) packet 17 g, 17 g, Oral, Daily PRN, Roberto Colorado MD    polyvinyl alcohol (LIQUIFILM TEARS) 1 4 % ophthalmic solution 1 drop, 1 drop, Both Eyes, Q3H PRN, Roberto Colorado MD    sertraline (ZOLOFT) tablet 175 mg, 175 mg, Oral, Daily, Roberto Colorado MD, 175 mg at 02/20/20 0820    sucralfate (CARAFATE) oral suspension 1,000 mg, 1,000 mg, Oral, BID, Orville Gorman MD, 1,000 mg at 02/21/20 0605    theophylline (JEF-24) 24 hr capsule 200 mg, 200 mg, Oral, Daily, Roberto Colorado MD, 200 mg at 02/20/20 0820    tiotropium [facility]) capsule for inhaler 18 mcg, 18 mcg, Inhalation, Daily, Roberto Colorado MD, 18 mcg at 02/20/20 0820    traZODone (DESYREL) tablet 25 mg, 25 mg, Oral, HS PRN, Roberto Colorado MD  Justification if on more than two antipsychotics: not applicable  Side effects if any: none    Risks , benefits, side effects and precautions of medications discussed with patient and reminded patient to let us know any problems with medications     Objective:     Vital Signs:  Vitals:    02/20/20 0700 02/20/20 0705 02/20/20 1555 02/1955   BP: 121/77 96/56 115/67 117/68   BP Location: Left arm Left arm Left arm Left arm   Pulse: 91 71 87 85   Resp: 18 18 17 18   Temp: (!) 96 6 °F (35 9 °C)  (!) 97 1 °F (36 2 °C) (!) 97 °F (36 1 °C)   TempSrc: Temporal  Temporal Temporal   SpO2:   93% 91%   Weight:       Height:         Appearance:  age appropriate and older than stated age poorly groomed fine laying in bed friendly pleasant but still somewhat suspicious   Behavior:  deviant, evasive and guarded becoming suspicious   Speech:  delayed, increased latency of response and tangential    Mood:  anxious, euphoric and irritable    Affect:  increased in intensity, increased in range, mood-congruent and redirectable   Thought Process:  circumstantial, concrete, goal directed, illogical and perserverative with tendency to have stilted speech   Thought Content:  delusions  grandiose and persecutory no current suicidal homicidal thoughts and under plans  No phobias obsessions or distorted body perception is a  No overt delusions but still appears to harbor some paranoia about people tampering with her oxygen concentrator at night  Still psychosomatic about having difficulty breathing and dying from choking on food   Perceptual Disturbances: None    Risk Potential: Inability to care for herself and refusal to take showers regularly   Sensorium:  person, place, time/date, situation, day of week, month of year, year and time   Cognition:  grossly intact aware of current events, do not deficit in recent or remote memory, no language deficits   Consciousness:  alert and awake    Attention: Fair   Intellect: Average   Insight:  Limited   Judgment: fair       Motor Activity: no abnormal movements         Recent Labs:  Results Reviewed     None          I/O Past 24 hours:  No intake/output data recorded  No intake/output data recorded  Assessment / Plan:     [facility]  Overall status:  I improving    Reason for continued inpatient care: to assess ability to maintain improvement by attending to ADLs and taking showers regular and see how she does on outing with family after another outing with staff escort next week  Acceptance by patient: accepting now  Hopefulness in recovery: living at [address]  Understanding of medications :  yes  Involved in reintegration process: attending groups and has off grounds and off unit privileges   Trusting in relatoinship with psychiatrist:  Nino Menendez now  Overall status :  No changes  Recommended Treatment:    Medication changes:  1) none today    Non-pharmacological treatments  1) Continue with individual therapy, group therapy, milieu therapy and occupational therapy using recovery principles and psycho-education about accepting illness and need for treatment     2) encourage to take showers twice a week and cooperate with treatment and adl skills as per her behav  plan  3) reschedule another pass with staff to community again before pass with sister to see if she would fully comply with assigned outing   Safety  1) Safety/communication plan established targeting dynamic risk factors above  Discharge Plan to a [facility] at [address] / Coordination of Care    Total floor / unit time spent today 15 minutes  Greater than 50% of total time was spent with the patient and / or family counseling and / or coordination of care  Receptive to listening and learning coping skills for management of symptoms and attending to ADLs  Patient's Rights, confidentiality and exceptions to confidentiality, use of automated medical record, [facility] staff access to medical record, and consent to treatment reviewed      Sindy Day MD

## 2020-02-21 NOTE — PLAN OF CARE
Problem: DISCHARGE PLANNING  Goal: Discharge to home or other facility with appropriate resources  Description  INTERVENTIONS:  - Conduct assessment to determine patient/family and health care team treatment goals, and need for post-acute services based on payer coverage, community resources, and patient preferences, and barriers to discharge  - Address psychosocial, clinical, and financial barriers to discharge as identified in assessment in conjunction with the patient/family and health care team  - Assist the patient in reintegration back into the community by removing barriers which may hinder a successful discharge once deemed stable  - Arrange appropriate level of post-acute services according to patient's needs and preference and payer coverage in collaboration with the physician and health care team  - Communicate with and update the patient/family, physician, and health care team regarding progress on the discharge plan  - Arrange appropriate transportation to post-acute venues    Outcome: Progressing     CL received phone call from Bari Le with [facility] stating she received patient's referral and would like to come out and interview patient on the unit Thursday, February 27, 2020 at 12 PM  CM confirmed this appointment and informed patient of this plan  CM will continue to follow patient's progress and assist with discharge planning needs

## 2020-02-21 NOTE — PLAN OF CARE
Problem: Alteration in Thoughts and Perception  Goal: Verbalize thoughts and feelings  Description  Interventions:  - Promote a nonjudgmental and trusting relationship with the patient through active listening and therapeutic communication  - Assess patient's level of functioning, behavior and potential for risk  - Engage patient in 1 on 1 interactions for a minimum of 15 minutes each session  - Encourage patient to express fears, feelings, frustrations, and discuss symptoms    - [place] patient to reality, help patient recognize reality-based thinking   - Administer medications as ordered and assess for potential side effects  - Provide the patient education related to the signs and symptoms of the illness and desired effects of prescribed medications  Outcome: Progressing  Goal: Agree to be compliant with medication regime, as prescribed and report medication side effects  Description  Interventions:  - Offer appropriate PRN medication and supervise ingestion; conduct aims, as needed   Outcome: Progressing  Goal: Attend and participate in unit activities, including therapeutic, recreational, and educational groups  Description  Interventions:  - Provide therapeutic and educational activities daily, encourage attendance and participation, and document same in the medical record     CERTIFIED PEER SPECIALIST INTERVENTIONS:    Complete peer assessment with patient to assess their needs and identify their goals to complete while in the recovery program as well as once discharged into the community  Patient will complete WRAP Plan, Crisis Plan and 5 Life Domains  Patient will attend 50% of groups offered on the unit  Patient will complete a goal card weekly      Outcome: Progressing     Problem: Depression  Goal: Refrain from isolation  Description  Interventions:  - Develop a trusting relationship   - Encourage socialization   Outcome: Progressing     [address] has been much more visible in milieu, more spontaneous & interactive w/peers, staff she encounters  Very 'mother hen' in her concern for specific staff member's welfare, dispensing advice, checking to see if she has eaten, is warm enough etc  Ate 75% of meal plus an Ensure  Came up on own for supper medicine  Did a load of laundry  Wanted to shower, but, no female MHT available until after 1700, too late in her estimate due to post meal "digestion" issues  Did get help from staff to have chin shaved  Is taking part now in Women's Group

## 2020-02-21 NOTE — PROGRESS NOTES
02/21/20 1100   Activity/Group Checklist   Group Other (Comment)  ([facility] - Weekly Goal Review)   Attendance Attended   Attendance Duration (min) 31-45   Interactions Interacted appropriately   Affect/Mood Appropriate   Goals Achieved Able to listen to others; Able to engage in interactions; Able to recieve feedback; Able to reflect/comment on own behavior     Patient attended [facility] - Weekly Goal Review  Patient reported that she completed a goal of going off-grounds, but that the trip was "unsuccessful," due to her fear of "open spaces," which she interprets as the [employer] parking lot  Patient has previously reported being fearful of "closed in spaces "  Patient blames her "agoraphobia" for these fears  Patient would like to set a new goal of getting off-grounds next week  She also reports that she has improved her showering and group attendance  When asked if she had any advice to offer her peer who also has some difficulty with showering, she declined, stating it was "negative reinforcement "  Patient participated and was respectful

## 2020-02-21 NOTE — PROGRESS NOTES
02/21/20 0800   Team Meeting   Meeting Type Daily Rounds   Team Members Present   Team Members Present Physician;Nurse;; Other (Discipline and Name)   Physician Team Member Dr Romie Drake Team Member Alison Regalado RN   Care Management Team Member Brenda Fowler   Other (Discipline and Name) Alexis Winslow LCSW     Patient presented with a poor appetite yesterday  Reported to have been a "mother hen" over the well being of a staff member  Slept

## 2020-02-22 NOTE — PROGRESS NOTES
Psychiatry Progress Note    Subjective: Interval History     The patient is resting in bed this morning  Staff states she has been very suspicious today  She believes that Carafate that was given to her this morning burned her throat  Patient stating today she is unsure if someone put something in it  She continues to be obsessive  Patient states she was afraid whatever burned her throat would then effect her blood sugars  She continues to have poor insight  She has been isolative to her room and napping often during the day  She is compliant with medication  Her appetite has been fair  She offers no other concerns today      Behavior over the last 24 hours:  unchanged  Sleep: normal  Appetite: fair  Medication side effects: No  ROS: no complaints    Current medications:    Current Facility-Administered Medications:     acetaminophen (TYLENOL) tablet 325 mg, 325 mg, Oral, Q6H PRN, Dalton Garner MD    acetaminophen (TYLENOL) tablet 650 mg, 650 mg, Oral, Q6H PRN, Dalton Garner MD    acetaminophen (TYLENOL) tablet 650 mg, 650 mg, Oral, Q8H PRN, Dalton Garner MD    albuterol (PROVENTIL HFA,VENTOLIN HFA) inhaler 2 puff, 2 puff, Inhalation, Q4H PRN, Dalton Garner MD, 2 puff at 10/11/19 0424    aluminum-magnesium hydroxide-simethicone (MYLANTA) 200-200-20 mg/5 mL oral suspension 15 mL, 15 mL, Oral, Q4H PRN, Dalton Garner MD, 15 mL at 01/26/20 1021    ammonium lactate (LAC-HYDRIN) 12 % lotion 1 application, 1 application, Topical, BID PRN, Dalton Garner MD    bacitracin topical ointment 1 small application, 1 small application, Topical, Daily, Arnie LinerABILIO, 1 small application at 66/53/12 1016    benzonatate (TESSALON PERLES) capsule 100 mg, 100 mg, Oral, TID PRN, Dalton Garner MD    benztropine (COGENTIN) injection 1 mg, 1 mg, Intramuscular, Q8H PRN, Dalton Garner MD    carbamide peroxide (DEBROX) 6 5 % otic solution 5 drop, 5 drop, Left Ear, BID PRN, Nohemy Robbins MD    cloZAPine (CLOZARIL) tablet 25 mg, 25 mg, Oral, BID, Roberto Colorado MD, 25 mg at 02/21/20 2057    cloZAPine (CLOZARIL) tablet 50 mg, 50 mg, Oral, BID, Roberto Colorado MD, 50 mg at 02/21/20 2057    docusate sodium (COLACE) capsule 100 mg, 100 mg, Oral, BID PRN, Roberto Colorado MD    EPINEPHrine PF (ADRENALIN) 1 mg/mL injection 0 15 mg, 0 15 mg, Intramuscular, Once PRN, Roberto Colorado MD    fluticasone-vilanterol (BREO ELLIPTA) 200-25 MCG/INH inhaler 1 puff, 1 puff, Inhalation, Daily, Roberto Colorado MD, 1 puff at 02/22/20 0913    ketotifen (ZADITOR) 0 025 % ophthalmic solution 1 drop, 1 drop, Right Eye, BID PRN, Roberto Colorado MD    levothyroxine tablet 125 mcg, 125 mcg, Oral, Early Morning, Roberto Colorado MD, 125 mcg at 02/22/20 5718    magnesium hydroxide (MILK OF MAGNESIA) 400 mg/5 mL oral suspension 30 mL, 30 mL, Oral, Daily PRN, Roberto Colorado MD    montelukast (SINGULAIR) tablet 10 mg, 10 mg, Oral, HS, Roberto Colorado MD, 10 mg at 01/16/20 2106    OLANZapine (ZyPREXA) IM injection 5 mg, 5 mg, Intramuscular, Q8H PRN, Roberto Colorado MD    OLANZapine (ZyPREXA) tablet 5 mg, 5 mg, Oral, Q8H PRN, Roberto Colorado MD    ondansetron (ZOFRAN-ODT) dispersible tablet 4 mg, 4 mg, Oral, Q6H PRN, Roberto Colorado MD, 4 mg at 12/09/19 1757    pantoprazole (PROTONIX) EC tablet 40 mg, 40 mg, Oral, Early Morning, Roberto Colorado MD, 40 mg at 02/22/20 0558    polyethylene glycol (MIRALAX) packet 17 g, 17 g, Oral, Daily PRN, Roberto Colorado MD    polyvinyl alcohol (LIQUIFILM TEARS) 1 4 % ophthalmic solution 1 drop, 1 drop, Both Eyes, Q3H PRN, Roberto Colorado MD    sertraline (ZOLOFT) tablet 175 mg, 175 mg, Oral, Daily, Roberto Colorado MD, 175 mg at 02/22/20 0913    sucralfate (CARAFATE) oral suspension 1,000 mg, 1,000 mg, Oral, BID, Cat Torres MD, 1,000 mg at 02/22/20 2125    theophylline (JEF-24) 24 hr capsule 200 mg, 200 mg, Oral, Daily, Roberto Colorado MD, 200 mg at 02/22/20 0913    tiotropium [facility]) capsule for inhaler 18 mcg, 18 mcg, Inhalation, Daily, Roberto Colorado MD, 18 mcg at 02/22/20 1015    traZODone (DESYREL) tablet 25 mg, 25 mg, Oral, HS PRN, Sarah Hanna MD    Current Problem List:    Patient Active Problem List   Diagnosis    Sepsis ([facility] )    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[facility] )    Tobacco use disorder, continuous    Bipolar disorder ([facility] )    Lactic acidosis    Hypokalemia    Hypomagnesemia    Compression fracture of L4 lumbar vertebra    Thoracic compression fracture (HCC)    Ventral hernia    Parapneumonic effusion    Acute on chronic respiratory failure with hypoxia (HCC)    Chronic respiratory failure (HCC)    Hypophosphatemia    Elevated MCV    Schizoaffective disorder, bipolar type (HCC)    Acquired hypothyroidism    Gastroesophageal reflux disease without esophagitis    Abnormal CT of the chest    Excessive cerumen in left ear canal    Lipoma of right upper extremity    Polydipsia    Localized swelling of both lower legs    Noncompliant with deep vein thrombosis (DVT) prophylaxis    Allergic conjunctivitis of right eye    At risk for aspiration       Problem list reviewed 02/22/20     Objective:     Vital Signs:  Vitals:    02/21/20 0732 02/21/20 1610 02/21/20 2010 02/22/20 0730   BP: 112/61 97/65 90/74 108/78   BP Location: Left arm Left arm Left arm Left arm   Pulse: 82 77 80 84   Resp:  18 18 16   Temp:  (!) 97 2 °F (36 2 °C) (!) 97 1 °F (36 2 °C) 97 7 °F (36 5 °C)   TempSrc:  Temporal Temporal Temporal   SpO2:  94% 92%    Weight:       Height:             Appearance:  age appropriate and casually dressed   Behavior:  guarded   Speech:  normal volume   Mood:  anxious   Affect:  constricted   Thought Process:  perserverative   Thought Content:  delusions  persecutory   Perceptual Disturbances: None   Risk Potential: none   Sensorium:  person, place, situation and time   Cognition:  intact   Consciousness:  alert and awake    Attention: attention span and concentration were age appropriate   Intellect: average   Insight:  limited   Judgment: limited      Motor Activity: no abnormal movements       I/O Past 24 hours:  No intake/output data recorded  No intake/output data recorded  Labs:  Reviewed 02/22/20    Progress Toward Goals:  Unchanged    Assessment / Plan:     Schizoaffective disorder, bipolar type ([facility] )    Recommended Treatment:      Medication changes:  1) continue current medication regimen    Non-pharmacological treatments  1) Continue with group therapy, milieu therapy and occupational therapy  Safety  1) Safety/communication plan established targeting dynamic risk factors above  2) Risks, benefits, and possible side effects of medications explained to patient and patient verbalizes understanding  Counseling / Coordination of Care    Total floor / unit time spent today 20 minutes  Greater than 50% of total time was spent with the patient and / or family counseling and / or coordination of care  A description of the counseling / coordination of care  Patient's Rights, confidentiality and exceptions to confidentiality, use of automated medical record, [facility] staff access to medical record, and consent to treatment reviewed      Jenaro Kaur PA-C

## 2020-02-22 NOTE — PLAN OF CARE
Problem: Alteration in Thoughts and Perception  Goal: Verbalize thoughts and feelings  Description  Interventions:  - Promote a nonjudgmental and trusting relationship with the patient through active listening and therapeutic communication  - Assess patient's level of functioning, behavior and potential for risk  - Engage patient in 1 on 1 interactions for a minimum of 15 minutes each session  - Encourage patient to express fears, feelings, frustrations, and discuss symptoms    - [place] patient to reality, help patient recognize reality-based thinking   - Administer medications as ordered and assess for potential side effects  - Provide the patient education related to the signs and symptoms of the illness and desired effects of prescribed medications  Outcome: Progressing  Goal: Agree to be compliant with medication regime, as prescribed and report medication side effects  Description  Interventions:  - Offer appropriate PRN medication and supervise ingestion; conduct aims, as needed   Outcome: Progressing  Goal: Complete daily ADLs, including personal hygiene independently, as able  Description  Interventions:  - Observe, teach, and assist patient with ADLS  - Monitor and promote a balance of rest/activity, with adequate nutrition and elimination   Outcome: Progressing     Problem: Depression  Goal: Refrain from isolation  Description  Interventions:  - Develop a trusting relationship   - Encourage socialization   Outcome: Gene Torrez was willing to shower & groom, but, was a bit anxious during the process as felt "rushed" by staff  But, was glad afterwards to have accomplished the task  Verbal of another community outing next week about which has mixed feelings, also, anticipates meeting w/the [place] next week & wants to formulate questions for them (I e  Who will assist her w/bathing tasks)  This supported & encouraged  Came up for supper medicine, ate 100% of meal plus Ensure   Sat out in dining room a good hour chatting w/peers & staff present in room

## 2020-02-22 NOTE — PROGRESS NOTES
Maggie slept throughout the night  She was only up once to use the bathroom  Oxygen (humidified) 1 liter via nasal cannula while sleeping  Respirations easy and non labored  No issues  Medication compliant  Continue to monitor every 15 minutes  Precautions maintained

## 2020-02-22 NOTE — PLAN OF CARE
Problem: Alteration in Thoughts and Perception  Goal: Verbalize thoughts and feelings  Description  Interventions:  - Promote a nonjudgmental and trusting relationship with the patient through active listening and therapeutic communication  - Assess patient's level of functioning, behavior and potential for risk  - Engage patient in 1 on 1 interactions for a minimum of 15 minutes each session  - Encourage patient to express fears, feelings, frustrations, and discuss symptoms    - [place] patient to reality, help patient recognize reality-based thinking   - Administer medications as ordered and assess for potential side effects  - Provide the patient education related to the signs and symptoms of the illness and desired effects of prescribed medications  Outcome: Progressing  Goal: Agree to be compliant with medication regime, as prescribed and report medication side effects  Description  Interventions:  - Offer appropriate PRN medication and supervise ingestion; conduct aims, as needed   Outcome: Progressing  Goal: Complete daily ADLs, including personal hygiene independently, as able  Description  Interventions:  - Observe, teach, and assist patient with ADLS  - Monitor and promote a balance of rest/activity, with adequate nutrition and elimination   Outcome: Progressing     Problem: Risk for Self Injury/Neglect  Goal: Refrain from harming self  Description  Interventions:  - Monitor patient closely, per order  - Develop a trusting relationship  - Supervise medication ingestion, monitor effects and side effects   Outcome: Progressing     Problem: Alteration in Orientation  Goal: Interact with staff daily  Description  Interventions:  - Assess and re-assess patient's level of orientation  - Engage patient in 1 on 1 interactions, daily, for a minimum of 15 minutes   - Establish rapport/trust with patient   Outcome: Prince Delatorre was in her room at the beginning of the shift  Napping at times   Interacts with select peers  Pleasant upon approach by staff  Needed prompting to come for medication  Stated that "the Carafate made my throat burn this morning  I think someone put something in it"  She was reassured that nothing was put in it because this writer gave it to her this AM"  She responded with, "oh ok"  She swallowed the Carafate and then went to her room  No complaint of burning  Prompted to come for pills  Swallowed pills without an issue  Ate 75% of her meal and drank an Ensure  Continue to monitor  Precautions maintained

## 2020-02-22 NOTE — PLAN OF CARE
Problem: Alteration in Thoughts and Perception  Goal: Attend and participate in unit activities, including therapeutic, recreational, and educational groups  Description  Interventions:  - Provide therapeutic and educational activities daily, encourage attendance and participation, and document same in the medical record     CERTIFIED PEER SPECIALIST INTERVENTIONS:    Complete peer assessment with patient to assess their needs and identify their goals to complete while in the recovery program as well as once discharged into the community  Patient will complete WRAP Plan, Crisis Plan and 5 Life Domains  Patient will attend 50% of groups offered on the unit  Patient will complete a goal card weekly  Outcome: Not Progressing     Problem: Ineffective Coping  Goal: Demonstrates healthy coping skills  Outcome: Not Progressing     Problem: Depression  Goal: Refrain from isolation  Description  Interventions:  - Develop a trusting relationship   - Encourage socialization   Outcome: Not Progressing          Hazel Greensamantha Steiner was isolative to room throughout shift  Pt was encouraged to spend time out of room socializing with staff and peers however refused  Pt was only visible for meals and consumed 25% of breakfast and 25% of lunch  No change in status observed/reported  Will continue to monitor

## 2020-02-22 NOTE — NURSING NOTE
Pt anxious, suspicious and obsessive  Believes carafate burned her throat this am   Appetite fair  Gait steady  VSS  Med-compliant  Rates anxiety at "4" (0-4), depession at "5" (0-10); denied SI and AH  Isolative to room during freetime, napping  Monitored for safety and support

## 2020-02-22 NOTE — PROGRESS NOTES
[facility] elected the PM game group option as opposed to the movie  Govind Day w/two peers while chatting animatedly  Used the [employer] achieving volumes of 1250ml  Came up for HS medicine, had an HS snack  Stayed out in dining room to socialize some more  Now wearing her QHS humidified nasal O2 @ 1L for bed

## 2020-02-23 NOTE — PROGRESS NOTES
Psychiatry Progress Note    Subjective: Interval History     The patient is resting in bed this morning  She states that the staff are making her anxious  She continues to be paranoid  She states again today that her throat burned yesterday after taking Carafate  She denies that this occurred again today  Patient states she did not go journaling group yesterday because her medications make her tired  She states she does go to a lot of other groups  She is compliant medication and meals  She slept through the night      Behavior over the last 24 hours:  unchanged  Sleep: normal  Appetite: fair  Medication side effects: No  ROS: no complaints    Current medications:    Current Facility-Administered Medications:     acetaminophen (TYLENOL) tablet 325 mg, 325 mg, Oral, Q6H PRN, Shi Pham MD    acetaminophen (TYLENOL) tablet 650 mg, 650 mg, Oral, Q6H PRN, Shi Pham MD    acetaminophen (TYLENOL) tablet 650 mg, 650 mg, Oral, Q8H PRN, Shi Pham MD    albuterol (PROVENTIL HFA,VENTOLIN HFA) inhaler 2 puff, 2 puff, Inhalation, Q4H PRN, Shi Pham MD, 2 puff at 10/11/19 0424    aluminum-magnesium hydroxide-simethicone (MYLANTA) 200-200-20 mg/5 mL oral suspension 15 mL, 15 mL, Oral, Q4H PRN, Shi Pham MD, 15 mL at 01/26/20 1021    ammonium lactate (LAC-HYDRIN) 12 % lotion 1 application, 1 application, Topical, BID PRN, Shi Pham MD    bacitracin topical ointment 1 small application, 1 small application, Topical, Daily, ABILIO Holguin, 1 small application at 75/20/33 1016    benzonatate (TESSALON PERLES) capsule 100 mg, 100 mg, Oral, TID PRN, Shi Pham MD    benztropine (COGENTIN) injection 1 mg, 1 mg, Intramuscular, Q8H PRN, Shi Pham MD    carbamide peroxide (DEBROX) 6 5 % otic solution 5 drop, 5 drop, Left Ear, BID PRN, Christ Kc, MD    cloZAPine (CLOZARIL) tablet 25 mg, 25 mg, Oral, BID, Shi Pham MD, 25 mg at 02/22/20 2103    cloZAPine (CLOZARIL) tablet 50 mg, 50 mg, Oral, BID, Ivonne Nevarez MD, 50 mg at 02/22/20 2104    docusate sodium (COLACE) capsule 100 mg, 100 mg, Oral, BID PRN, Ivonne Nevarez MD    EPINEPHrine PF (ADRENALIN) 1 mg/mL injection 0 15 mg, 0 15 mg, Intramuscular, Once PRN, Ivonne Nevarez MD    fluticasone-vilanterol (BREO ELLIPTA) 200-25 MCG/INH inhaler 1 puff, 1 puff, Inhalation, Daily, Ivonne Nevarez MD, 1 puff at 02/23/20 0854    ketotifen (ZADITOR) 0 025 % ophthalmic solution 1 drop, 1 drop, Right Eye, BID PRN, Ivonne Nevarez MD    levothyroxine tablet 125 mcg, 125 mcg, Oral, Early Morning, Ivonne Nevarez MD, 125 mcg at 02/23/20 0553    magnesium hydroxide (MILK OF MAGNESIA) 400 mg/5 mL oral suspension 30 mL, 30 mL, Oral, Daily PRN, Ivonne Nevarez MD    montelukast (SINGULAIR) tablet 10 mg, 10 mg, Oral, HS, Ivonne Nevarez MD, 10 mg at 02/22/20 2104    OLANZapine (ZyPREXA) IM injection 5 mg, 5 mg, Intramuscular, Q8H PRN, Ivonne Nevarez MD    OLANZapine (ZyPREXA) tablet 5 mg, 5 mg, Oral, Q8H PRN, Ivonne Nevarez MD    ondansetron (ZOFRAN-ODT) dispersible tablet 4 mg, 4 mg, Oral, Q6H PRN, Ivonne Nevarez MD, 4 mg at 12/09/19 1757    pantoprazole (PROTONIX) EC tablet 40 mg, 40 mg, Oral, Early Morning, Ivonne Nevarez MD, 40 mg at 02/23/20 0553    polyethylene glycol (MIRALAX) packet 17 g, 17 g, Oral, Daily PRN, Ivonne Nevarez MD    polyvinyl alcohol (LIQUIFILM TEARS) 1 4 % ophthalmic solution 1 drop, 1 drop, Both Eyes, Q3H PRN, Ivonne Nevarez MD    sertraline (ZOLOFT) tablet 175 mg, 175 mg, Oral, Daily, Ivonne Nevarez MD, 175 mg at 02/23/20 5061    sucralfate (CARAFATE) oral suspension 1,000 mg, 1,000 mg, Oral, BID, Cat Torres MD, 1,000 mg at 02/23/20 4740    theophylline (JEF-24) 24 hr capsule 200 mg, 200 mg, Oral, Daily, Ivonne Nevarez MD, 200 mg at 02/23/20 0853    tiotropium [facility]) capsule for inhaler 18 mcg, 18 mcg, Inhalation, Daily, Ivonne Nevarez MD, 18 mcg at 02/23/20 0854    traZODone (DESYREL) tablet 25 mg, 25 mg, Oral, HS PRN, Ivonne Nevarez MD    Current Problem List:    Patient Active Problem List   Diagnosis    Sepsis ([facility]    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[facility] )    Tobacco use disorder, continuous    Bipolar disorder ([facility] )    Lactic acidosis    Hypokalemia    Hypomagnesemia    Compression fracture of L4 lumbar vertebra    Thoracic compression fracture (HCC)    Ventral hernia    Parapneumonic effusion    Acute on chronic respiratory failure with hypoxia (HCC)    Chronic respiratory failure (HCC)    Hypophosphatemia    Elevated MCV    Schizoaffective disorder, bipolar type ([facility] )    Acquired hypothyroidism    Gastroesophageal reflux disease without esophagitis    Abnormal CT of the chest    Excessive cerumen in left ear canal    Lipoma of right upper extremity    Polydipsia    Localized swelling of both lower legs    Noncompliant with deep vein thrombosis (DVT) prophylaxis    Allergic conjunctivitis of right eye    At risk for aspiration       Problem list reviewed 02/23/20     Objective:     Vital Signs:  Vitals:    02/22/20 1600 02/22/20 2000 02/22/20 2150 02/23/20 0700   BP: 92/60 94/60  127/63   BP Location: Left arm Left arm  Right arm   Pulse:  70 95 90   Resp: 16 14  18   Temp: 97 6 °F (36 4 °C) 97 5 °F (36 4 °C)  97 5 °F (36 4 °C)   TempSrc: Temporal Temporal     SpO2: 96% 93% 92% 95%   Weight:    66 6 kg (146 lb 12 8 oz)   Height:             Appearance:  age appropriate and casually dressed   Behavior:  guarded   Speech:  normal volume   Mood:  anxious   Affect:  constricted   Thought Process:  perserverative   Thought Content:  delusions  persecutory   Perceptual Disturbances: None   Risk Potential: none   Sensorium:  person, place, situation and time   Cognition:  intact   Consciousness:  alert and awake    Attention: attention span and concentration were age appropriate   Intellect: average   Insight:  limited   Judgment: limited      Motor Activity: no abnormal movements       I/O Past 24 hours:  No intake/output data recorded    No intake/output data recorded  Labs:  Reviewed 02/23/20    Progress Toward Goals:  Unchanged    Assessment / Plan:     Schizoaffective disorder, bipolar type ([facility] [address] )    Recommended Treatment:      Medication changes:  1) continue current medication regimen    Non-pharmacological treatments  1) Continue with group therapy, milieu therapy and occupational therapy  Safety  1) Safety/communication plan established targeting dynamic risk factors above  2) Risks, benefits, and possible side effects of medications explained to patient and patient verbalizes understanding  Counseling / Coordination of Care    Total floor / unit time spent today 20 minutes  Greater than 50% of total time was spent with the patient and / or family counseling and / or coordination of care  A description of the counseling / coordination of care  Patient's Rights, confidentiality and exceptions to confidentiality, use of automated medical record, [address] [place] [address] staff access to medical record, and consent to treatment reviewed      Lora Roach PA-C

## 2020-02-23 NOTE — NURSING NOTE
Patient visible on unit at times, mainly in own room, med and meal compliant  Patient attended groups this morning, cooperative upon approach, offers no complaints  Patient disheveled, poor hygiene  Patient behaviors controlled will continue to monitor

## 2020-02-23 NOTE — PLAN OF CARE
Problem: Alteration in Thoughts and Perception  Goal: Verbalize thoughts and feelings  Description  Interventions:  - Promote a nonjudgmental and trusting relationship with the patient through active listening and therapeutic communication  - Assess patient's level of functioning, behavior and potential for risk  - Engage patient in 1 on 1 interactions for a minimum of 15 minutes each session  - Encourage patient to express fears, feelings, frustrations, and discuss symptoms    - [place] patient to reality, help patient recognize reality-based thinking   - Administer medications as ordered and assess for potential side effects  - Provide the patient education related to the signs and symptoms of the illness and desired effects of prescribed medications  Outcome: Progressing  Goal: Agree to be compliant with medication regime, as prescribed and report medication side effects  Description  Interventions:  - Offer appropriate PRN medication and supervise ingestion; conduct aims, as needed   Outcome: Progressing     Problem: Ineffective Coping  Goal: Participates in unit activities  Description  Interventions:  - Provide therapeutic environment   - Provide required programming   - Redirect inappropriate behaviors   Outcome: Progressing     Problem: Alteration in Orientation  Goal: Interact with staff daily  Description  Interventions:  - Assess and re-assess patient's level of orientation  - Engage patient in 1 on 1 interactions, daily, for a minimum of 15 minutes   - Establish rapport/trust with patient   Outcome: Progressing     Problem: Alteration in Thoughts and Perception  Goal: Complete daily ADLs, including personal hygiene independently, as able  Description  Interventions:  - Observe, teach, and assist patient with ADLS  - Monitor and promote a balance of rest/activity, with adequate nutrition and elimination   Outcome: Jannette Tolliver had a visit with her sister at the beginning of the shift  Appeared to go well  Good interaction  Davidson Jorge and her sister had to be reminded that any drinks brought in have to be sealed  Adrian Baumann would not give her coffee to the staff when approached  Ate food that her sister brought and then ate 30% of her meal plus an Ensure  No difficulty swallowing noted  Wanted MHT to watch her eat because she was afraid of choking  No shower this shift  Did not attend evening group  Took HS medication  Ate snack  Oxygen saturation 90% prior to oxygen 1 liter (humidified) via nasal cannula being applied  Continue to monitor  Precautions maintained

## 2020-02-24 NOTE — TREATMENT TEAM
02/24/20 1100   Activity/Group Checklist   Group   (IMR/Internal Barriers to Recovery )   Attendance Attended   Attendance Duration (min) 46-60   Interactions Interacted appropriately   Affect/Mood Appropriate;Normal range   Goals Achieved Identified feelings; Able to listen to others; Able to engage in interactions; Able to reflect/comment on own behavior;Able to self-disclose

## 2020-02-24 NOTE — ASSESSMENT & PLAN NOTE
Patient is excited as she found out that the [facility] is going to meet with her sometime this week for an intake interview  She did take a shower over the week and is anticipating to go on another therapeutic pass to the community this week  She did have a visit with his sister this weekend  She is still fearful that she might choke here and that she may be out of breath and claims that she is still sad being here but does not wish to increase or change the medications especially the Zoloft  She has been eating well and sleeping well  She continues to make some accusations about some of the staff tampering with her oxygen concentrator off and on  She has been compliant with her medications  No other complaints reported    She is attending most of the groups now    Current medications:    Current Facility-Administered Medications:     acetaminophen (TYLENOL) tablet 325 mg, 325 mg, Oral, Q6H PRN, Carissa Willis MD    acetaminophen (TYLENOL) tablet 650 mg, 650 mg, Oral, Q6H PRN, Carissa Willis MD    acetaminophen (TYLENOL) tablet 650 mg, 650 mg, Oral, Q8H PRN, Carissa Willis MD    albuterol (PROVENTIL HFA,VENTOLIN HFA) inhaler 2 puff, 2 puff, Inhalation, Q4H PRN, Carissa Willis MD, 2 puff at 10/11/19 0424    aluminum-magnesium hydroxide-simethicone (MYLANTA) 200-200-20 mg/5 mL oral suspension 15 mL, 15 mL, Oral, Q4H PRN, Carissa Willis MD, 15 mL at 01/26/20 1021    ammonium lactate (LAC-HYDRIN) 12 % lotion 1 application, 1 application, Topical, BID PRN, Carissa Willis MD    bacitracin topical ointment 1 small application, 1 small application, Topical, Daily, [place], CRNP, 1 small application at 00/23/99 1016    benzonatate (TESSALON PERLES) capsule 100 mg, 100 mg, Oral, TID PRN, Carissa Willis MD    benztropine (COGENTIN) injection 1 mg, 1 mg, Intramuscular, Q8H PRN, Carissa Willis MD    carbamide peroxide (DEBROX) 6 5 % otic solution 5 drop, 5 drop, Left Ear, BID PRN, Criselda Watson MD    cloZAPine (CLOZARIL) tablet 25 mg, 25 mg, Oral, BID, Krystyna Mcclain MD, 25 mg at 02/23/20 2050    cloZAPine (CLOZARIL) tablet 50 mg, 50 mg, Oral, BID, Krystyna Mcclain MD, 50 mg at 02/23/20 2050    docusate sodium (COLACE) capsule 100 mg, 100 mg, Oral, BID PRN, Krystyna Mcclain MD    EPINEPHrine PF (ADRENALIN) 1 mg/mL injection 0 15 mg, 0 15 mg, Intramuscular, Once PRN, Krystyna Mcclain MD    fluticasone-vilanterol (BREO ELLIPTA) 200-25 MCG/INH inhaler 1 puff, 1 puff, Inhalation, Daily, Krystyna Mcclain MD, 1 puff at 02/24/20 0802    ketotifen (ZADITOR) 0 025 % ophthalmic solution 1 drop, 1 drop, Right Eye, BID PRN, Krystyna Mcclain MD    levothyroxine tablet 125 mcg, 125 mcg, Oral, Early Morning, Krystyna Mcclain MD, 125 mcg at 02/24/20 0542    magnesium hydroxide (MILK OF MAGNESIA) 400 mg/5 mL oral suspension 30 mL, 30 mL, Oral, Daily PRN, Krystyna Mcclain MD    montelukast (SINGULAIR) tablet 10 mg, 10 mg, Oral, HS, Krystyna Mcclian MD, 10 mg at 02/22/20 2104    OLANZapine (ZyPREXA) IM injection 5 mg, 5 mg, Intramuscular, Q8H PRN, Krystyna Mcclain MD    OLANZapine (ZyPREXA) tablet 5 mg, 5 mg, Oral, Q8H PRN, Krystyna Mcclain MD    ondansetron (ZOFRAN-ODT) dispersible tablet 4 mg, 4 mg, Oral, Q6H PRN, Krystyna Mcclain MD, 4 mg at 12/09/19 1757    pantoprazole (PROTONIX) EC tablet 40 mg, 40 mg, Oral, Early Morning, Krystyna Mcclain MD, 40 mg at 02/24/20 0542    polyethylene glycol (MIRALAX) packet 17 g, 17 g, Oral, Daily PRN, Krystyna Mcclain MD    polyvinyl alcohol (LIQUIFILM TEARS) 1 4 % ophthalmic solution 1 drop, 1 drop, Both Eyes, Q3H PRN, Krystyna Mcclain MD    sertraline (ZOLOFT) tablet 175 mg, 175 mg, Oral, Daily, Krystyna Mcclain MD, 175 mg at 02/24/20 0803    sucralfate (CARAFATE) oral suspension 1,000 mg, 1,000 mg, Oral, BID, Cat Torres MD, 1,000 mg at 02/24/20 0606    theophylline (JEF-24) 24 hr capsule 200 mg, 200 mg, Oral, Daily, Krystyna Mcclain MD, 200 mg at 02/24/20 0804    tiotropium (SPIRIVA) capsule for inhaler 18 mcg, 18 mcg, Inhalation, Daily, Deep Durand MD, 18 mcg at 02/24/20 0802    traZODone (DESYREL) tablet 25 mg, 25 mg, Oral, HS PRN, Deep Durand MD  Justification if on more than two antipsychotics: not applicable  Side effects if any: none    Risks , benefits, side effects and precautions of medications discussed with patient and reminded patient to let us know any problems with medications     Objective:     Vital Signs:  Vitals:    02/23/20 2002 02/23/20 2135 02/24/20 0700 02/24/20 0705   BP: 92/58  114/74 102/63   BP Location: Left arm  Left arm Left arm   Pulse: 99 96 89 78   Resp: 14  18 18   Temp: 97 7 °F (36 5 °C)  97 7 °F (36 5 °C)    TempSrc: Temporal  Temporal    SpO2: 92% 90%  93%   Weight:       Height:         Appearance:  age appropriate and older than stated age found laying on bed but did get up when approached appearing suspicious not very well groomed   Behavior:  deviant, evasive and guarded with tendency to become suspicious off and on   Speech:  delayed, increased latency of response and tangential    Mood:  anxious, euphoric and irritable    Affect:  increased in intensity, increased in range, mood-congruent and redirectable   Thought Process:  circumstantial, concrete, goal directed, illogical and perserverative with tendency to become stilted   Thought Content:  delusions  grandiose and persecutory still psychosomatic about having difficulty breathing and dying from choking with food, still accuses some staff of tampering with her oxygen concentrator off and on  No other delusions elicited  No current suicidal homicidal thoughts and no plans reported    No phobias obsessions compulsions or distorted body perception    Perceptual Disturbances: None    Risk Potential: Inability to care for herself and refusal to take showers regularly   Sensorium:  person, place, time/date, situation, day of week, month of year, year and time   Cognition:  grossly intact no language deficit, no deficit in recent or remote memory, aware of current events   Consciousness:  alert and awake    Attention: Fair   Intellect: Average   Insight:  Limited   Judgment: fair       Motor Activity: no abnormal movements         Recent Labs:  Results Reviewed     None          I/O Past 24 hours:  No intake/output data recorded  No intake/output data recorded  Assessment / Plan:     Schizoaffective disorder, bipolar type ([address] )  Overall status:  I improving    Reason for continued inpatient care: to assess ability to maintain improvement by attending to ADLs and taking showers regular and see how she does on outing with family after another outing with staff escort next week  Acceptance by patient: accepting now  Hopefulness in recovery: living at the [facility]  Understanding of medications :  yes  Involved in reintegration process: attending groups and has off grounds and off unit privileges   Trusting in relatoinship with psychiatrist:  Sam Smallwood now  Overall status :  No changes  Recommended Treatment:    Medication changes:  1) none today    Non-pharmacological treatments  1) Continue with individual therapy, group therapy, milieu therapy and occupational therapy using recovery principles and psycho-education about accepting illness and need for treatment   2) encourage to take showers twice a week and cooperate with treatment and adl skills as per her behav  plan  3) reschedule another pass with staff to community again before pass with sister to see if she would fully comply with assigned outing   Safety  1) Safety/communication plan established targeting dynamic risk factors above  Discharge Plan to a [facility] at [address] / Coordination of Care    Total floor / unit time spent today 15 minutes  Greater than 50% of total time was spent with the patient and / or family counseling and / or coordination of care  Receptive to listening and learning coping skills for management of symptoms and attending to ADLs       Patient's Rights, confidentiality and exceptions to confidentiality, use of automated medical record, [address] staff access to medical record, and consent to treatment reviewed      Sandhya Bolaños MD

## 2020-02-24 NOTE — PROGRESS NOTES
Progress Note - Libby Costello 1955, 60 y o  female MRN: [id number]    Unit/Bed#: [facility] 110-02 Encounter: [id number]    Primary Care Provider: Yobani Duffy PA-C   Date and time admitted to hospital: 7/23/2019  5:30 PM        * Schizoaffective disorder, bipolar type [facility])  Assessment & Plan  Patient is excited as she found out that the [facility] is going to meet with her sometime this week for an intake interview  She did take a shower over the week and is anticipating to go on another therapeutic pass to the community this week  She did have a visit with his sister this weekend  She is still fearful that she might choke here and that she may be out of breath and claims that she is still sad being here but does not wish to increase or change the medications especially the Zoloft  She has been eating well and sleeping well  She continues to make some accusations about some of the staff tampering with her oxygen concentrator off and on  She has been compliant with her medications  No other complaints reported    She is attending most of the groups now    Current medications:    Current Facility-Administered Medications:     acetaminophen (TYLENOL) tablet 325 mg, 325 mg, Oral, Q6H PRN, Meldon Curling, MD    acetaminophen (TYLENOL) tablet 650 mg, 650 mg, Oral, Q6H PRN, Meldon Curling, MD    acetaminophen (TYLENOL) tablet 650 mg, 650 mg, Oral, Q8H PRN, Meldon Curling, MD    albuterol (PROVENTIL HFA,VENTOLIN HFA) inhaler 2 puff, 2 puff, Inhalation, Q4H PRN, Meldon Curling, MD, 2 puff at 10/11/19 0424    aluminum-magnesium hydroxide-simethicone (MYLANTA) 200-200-20 mg/5 mL oral suspension 15 mL, 15 mL, Oral, Q4H PRN, Meldon Curling, MD, 15 mL at 01/26/20 1021    ammonium lactate (LAC-HYDRIN) 12 % lotion 1 application, 1 application, Topical, BID PRN, Meldon Curling, MD    bacitracin topical ointment 1 small application, 1 small application, Topical, Daily, ABILIO Martin, 1 small application at 02/22/20 1016    benzonatate (TESSALON PERLES) capsule 100 mg, 100 mg, Oral, TID PRN, Felisa Florence MD    benztropine (COGENTIN) injection 1 mg, 1 mg, Intramuscular, Q8H PRN, Felisa Florence MD    carbamide peroxide (DEBROX) 6 5 % otic solution 5 drop, 5 drop, Left Ear, BID PRN, Jessica Pitts MD    cloZAPine (CLOZARIL) tablet 25 mg, 25 mg, Oral, BID, Felisa Florence MD, 25 mg at 02/23/20 2050    cloZAPine (CLOZARIL) tablet 50 mg, 50 mg, Oral, BID, Felisa Florence MD, 50 mg at 02/23/20 2050    docusate sodium (COLACE) capsule 100 mg, 100 mg, Oral, BID PRN, Felisa Florence MD    EPINEPHrine PF (ADRENALIN) 1 mg/mL injection 0 15 mg, 0 15 mg, Intramuscular, Once PRN, Felisa Florence MD    fluticasone-vilanterol (BREO ELLIPTA) 200-25 MCG/INH inhaler 1 puff, 1 puff, Inhalation, Daily, Felisa Florence MD, 1 puff at 02/24/20 0802    ketotifen (ZADITOR) 0 025 % ophthalmic solution 1 drop, 1 drop, Right Eye, BID PRN, Felisa Florence MD    levothyroxine tablet 125 mcg, 125 mcg, Oral, Early Morning, Felisa Florence MD, 125 mcg at 02/24/20 0542    magnesium hydroxide (MILK OF MAGNESIA) 400 mg/5 mL oral suspension 30 mL, 30 mL, Oral, Daily PRN, Felisa Florence MD    montelukast (SINGULAIR) tablet 10 mg, 10 mg, Oral, HS, Felisa Florence MD, 10 mg at 02/22/20 2104    OLANZapine (ZyPREXA) IM injection 5 mg, 5 mg, Intramuscular, Q8H PRN, Felisa Florence MD    OLANZapine (ZyPREXA) tablet 5 mg, 5 mg, Oral, Q8H PRN, Felisa Florence MD    ondansetron (ZOFRAN-ODT) dispersible tablet 4 mg, 4 mg, Oral, Q6H PRN, Felisa Florence MD, 4 mg at 12/09/19 1757    pantoprazole (PROTONIX) EC tablet 40 mg, 40 mg, Oral, Early Morning, Felisa Florence MD, 40 mg at 02/24/20 0542    polyethylene glycol (MIRALAX) packet 17 g, 17 g, Oral, Daily PRN, Felisa Florence MD    polyvinyl alcohol (LIQUIFILM TEARS) 1 4 % ophthalmic solution 1 drop, 1 drop, Both Eyes, Q3H PRN, Felisa Florence MD    sertraline (ZOLOFT) tablet 175 mg, 175 mg, Oral, Daily, Felisa Florence MD, 175 mg at 02/24/20 9649   sucralfate (CARAFATE) oral suspension 1,000 mg, 1,000 mg, Oral, BID, Cat Torres MD, 1,000 mg at 02/24/20 0606    theophylline (JEF-24) 24 hr capsule 200 mg, 200 mg, Oral, Daily, Tree Madden MD, 200 mg at 02/24/20 0804    tiotropium [facility]) capsule for inhaler 18 mcg, 18 mcg, Inhalation, Daily, Tree Madden MD, 18 mcg at 02/24/20 0802    traZODone (DESYREL) tablet 25 mg, 25 mg, Oral, HS PRN, Tree Madden MD  Justification if on more than two antipsychotics: not applicable  Side effects if any: none    Risks , benefits, side effects and precautions of medications discussed with patient and reminded patient to let us know any problems with medications     Objective:     Vital Signs:  Vitals:    02/23/20 2002 02/23/20 2135 02/24/20 0700 02/24/20 0705   BP: 92/58  114/74 102/63   BP Location: Left arm  Left arm Left arm   Pulse: 99 96 89 78   Resp: 14  18 18   Temp: 97 7 °F (36 5 °C)  97 7 °F (36 5 °C)    TempSrc: Temporal  Temporal    SpO2: 92% 90%  93%   Weight:       Height:         Appearance:  age appropriate and older than stated age found laying on bed but did get up when approached appearing suspicious not very well groomed   Behavior:  deviant, evasive and guarded with tendency to become suspicious off and on   Speech:  delayed, increased latency of response and tangential    Mood:  anxious, euphoric and irritable    Affect:  increased in intensity, increased in range, mood-congruent and redirectable   Thought Process:  circumstantial, concrete, goal directed, illogical and perserverative with tendency to become stilted   Thought Content:  delusions  grandiose and persecutory still psychosomatic about having difficulty breathing and dying from choking with food, still accuses some staff of tampering with her oxygen concentrator off and on  No other delusions elicited  No current suicidal homicidal thoughts and no plans reported    No phobias obsessions compulsions or distorted body perception Perceptual Disturbances: None    Risk Potential: Inability to care for herself and refusal to take showers regularly   Sensorium:  person, place, time/date, situation, day of week, month of year, year and time   Cognition:  grossly intact no language deficit, no deficit in recent or remote memory, aware of current events   Consciousness:  alert and awake    Attention: Fair   Intellect: Average   Insight:  Limited   Judgment: fair       Motor Activity: no abnormal movements         Recent Labs:  Results Reviewed     None          I/O Past 24 hours:  No intake/output data recorded  No intake/output data recorded  Assessment / Plan:     Schizoaffective disorder, bipolar type ([facility] [address] )  Overall status:  I improving    Reason for continued inpatient care: to assess ability to maintain improvement by attending to ADLs and taking showers regular and see how she does on outing with family after another outing with staff escort next week  Acceptance by patient: accepting now  Hopefulness in recovery: living at the [facility]  Understanding of medications :  yes  Involved in reintegration process: attending groups and has off grounds and off unit privileges   Trusting in relatoinship with psychiatrist:  Mio Samuels now  Overall status :  No changes  Recommended Treatment:    Medication changes:  1) none today    Non-pharmacological treatments  1) Continue with individual therapy, group therapy, milieu therapy and occupational therapy using recovery principles and psycho-education about accepting illness and need for treatment   2) encourage to take showers twice a week and cooperate with treatment and adl skills as per her behav  plan  3) reschedule another pass with staff to community again before pass with sister to see if she would fully comply with assigned outing   Safety  1) Safety/communication plan established targeting dynamic risk factors above    Discharge Plan to a [facility] at [address]  / Coordination of Care    Total floor / unit time spent today 15 minutes  Greater than 50% of total time was spent with the patient and / or family counseling and / or coordination of care  Receptive to listening and learning coping skills for management of symptoms and attending to ADLs  Patient's Rights, confidentiality and exceptions to confidentiality, use of automated medical record, [facility] adeline staff access to medical record, and consent to treatment reviewed      Allie Guzman MD

## 2020-02-24 NOTE — PROGRESS NOTES
02/24/20 0900   Team Meeting   Meeting Type Daily Rounds   Team Members Present   Team Members Present Physician;Nurse;; Other (Discipline and Name)   Physician Team Member Dr Hi Rubio Team Member Keyona Garcia RN   Care Management Team Member Brenda Vizcarra   Other (Discipline and Name) Moisés Greene LCSW     Patient had a good visit with her sister over the weekend  More spontaneous over the weekend  Slept

## 2020-02-24 NOTE — PLAN OF CARE
Problem: Alteration in Thoughts and Perception  Goal: Treatment Goal: Gain control of psychotic behaviors/thinking, reduce/eliminate presenting symptoms and demonstrate improved reality functioning upon discharge  Outcome: Progressing  Goal: Verbalize thoughts and feelings  Description  Interventions:  - Promote a nonjudgmental and trusting relationship with the patient through active listening and therapeutic communication  - Assess patient's level of functioning, behavior and potential for risk  - Engage patient in 1 on 1 interactions for a minimum of 15 minutes each session  - Encourage patient to express fears, feelings, frustrations, and discuss symptoms    - [place] patient to reality, help patient recognize reality-based thinking   - Administer medications as ordered and assess for potential side effects  - Provide the patient education related to the signs and symptoms of the illness and desired effects of prescribed medications  Outcome: Progressing  Goal: Agree to be compliant with medication regime, as prescribed and report medication side effects  Description  Interventions:  - Offer appropriate PRN medication and supervise ingestion; conduct aims, as needed   Outcome: Progressing  Goal: Attend and participate in unit activities, including therapeutic, recreational, and educational groups  Description  Interventions:  - Provide therapeutic and educational activities daily, encourage attendance and participation, and document same in the medical record     CERTIFIED PEER SPECIALIST INTERVENTIONS:    Complete peer assessment with patient to assess their needs and identify their goals to complete while in the recovery program as well as once discharged into the community  Patient will complete WRAP Plan, Crisis Plan and [facility]  Patient will attend 50% of groups offered on the unit  Patient will complete a goal card weekly      Outcome: Progressing  Goal: Recognize dysfunctional thoughts, communicate reality-based thoughts at the time of discharge  Description  Interventions:  - Provide medication and psycho-education to assist patient in compliance and developing insight into his/her illness   Outcome: Progressing     Problem: Ineffective Coping  Goal: Participates in unit activities  Description  Interventions:  - Provide therapeutic environment   - Provide required programming   - Redirect inappropriate behaviors   Outcome: Progressing  Goal: Patient/Family verbalizes awareness of resources  Outcome: Progressing  Goal: Understands least restrictive measures  Description  Interventions:  - Utilize least restrictive behavior  Outcome: Progressing     Problem: Risk for Self Injury/Neglect  Goal: Treatment Goal: Remain safe during length of stay, learn and adopt new coping skills, and be free of self-injurious ideation, impulses and acts at the time of discharge  Outcome: Progressing  Goal: Verbalize thoughts and feelings  Description  Interventions:  - Assess and re-assess patient's lethality and potential for self-injury  - Engage patient in 1:1 interactions, daily, for a minimum of 15 minutes  - Encourage patient to express feelings, fears, frustrations, hopes  - Establish rapport/trust with patient   Outcome: Progressing  Goal: Refrain from harming self  Description  Interventions:  - Monitor patient closely, per order  - Develop a trusting relationship  - Supervise medication ingestion, monitor effects and side effects   Outcome: Progressing  Goal: Attend and participate in unit activities, including therapeutic, recreational, and educational groups  Description  Interventions:  - Provide therapeutic and educational activities daily, encourage attendance and participation, and document same in the medical record  - Obtain collateral information, encourage visitation and family involvement in care   Outcome: Progressing     Problem: Depression  Goal: Treatment Goal: Demonstrate behavioral control of depressive symptoms, verbalize feelings of improved mood/affect, and adopt new coping skills prior to discharge  Outcome: Progressing  Goal: Verbalize thoughts and feelings  Description  Interventions:  - Assess and re-assess patient's level of risk   - Engage patient in 1:1 interactions, daily, for a minimum of 15 minutes   - Encourage patient to express feelings, fears, frustrations, hopes   Outcome: Progressing     Problem: Anxiety  Goal: Anxiety is at manageable level  Description  Interventions:  - Assess and monitor patient's anxiety level  - Monitor for signs and symptoms of anxiety both physical and emotional (heart palpitations, chest pain, shortness of breath, headaches, nausea, feeling jumpy, restlessness, irritable, apprehensive)  - Collaborate with interdisciplinary team and initiate plan and interventions as ordered    - [place] patient to unit/surroundings  - Explain treatment plan  - Encourage participation in care  - Encourage verbalization of concerns/fears  - Identify coping mechanisms  - Assist in developing anxiety-reducing skills  - Administer/offer alternative therapies  - Limit or eliminate stimulants  Outcome: Progressing     Problem: Alteration in Orientation  Goal: Interact with staff daily  Description  Interventions:  - Assess and re-assess patient's level of orientation  - Engage patient in 1 on 1 interactions, daily, for a minimum of 15 minutes   - Establish rapport/trust with patient   Outcome: Progressing     Problem: PAIN - ADULT  Goal: Verbalizes/displays adequate comfort level or baseline comfort level  Description  Interventions:  - Encourage patient to monitor pain and request assistance  - Assess pain using appropriate pain scale  - Administer analgesics based on type and severity of pain and evaluate response  - Implement non-pharmacological measures as appropriate and evaluate response  - Consider cultural and social influences on pain and pain management  - Notify physician/advanced practitioner if interventions unsuccessful or patient reports new pain  Outcome: Progressing     Problem: SAFETY ADULT  Goal: Patient will remain free of falls  Description  INTERVENTIONS:  - Assess patient frequently for physical needs  -  Identify cognitive and physical deficits and behaviors that affect risk of falls  -  [place] fall precautions as indicated by assessment   - Educate patient/family on patient safety including physical limitations  - Instruct patient to call for assistance with activity based on assessment  - Modify environment to reduce risk of injury  - Consider OT/PT consult to assist with strengthening/mobility  Outcome: Progressing     Problem: Alteration in Thoughts and Perception  Goal: Complete daily ADLs, including personal hygiene independently, as able  Description  Interventions:  - Observe, teach, and assist patient with ADLS  - Monitor and promote a balance of rest/activity, with adequate nutrition and elimination   Outcome: Not Progressing     Problem: Risk for Self Injury/Neglect  Goal: Recognize maladaptive responses and adopt new coping mechanisms  Outcome: Not Progressing  Goal: Complete daily ADLs, including personal hygiene independently, as able  Description  Interventions:  - Observe, teach, and assist patient with ADLS  - Monitor and promote a balance of rest/activity, with adequate nutrition and elimination  Outcome: Not Progressing     Problem: Depression  Goal: Refrain from self-neglect  Outcome: Not Progressing     Problem: Nutrition/Hydration-ADULT  Goal: Nutrient/Hydration intake appropriate for improving, restoring or maintaining nutritional needs  Description  Monitor and assess patient's nutrition/hydration status for malnutrition  Collaborate with interdisciplinary team and initiate plan and interventions as ordered  Monitor patient's weight and dietary intake as ordered or per policy   Utilize nutrition screening tool and intervene as necessary  Determine patient's food preferences and provide high-protein, high-caloric foods as appropriate  INTERVENTIONS:  - Monitor oral intake, urinary output, labs, and treatment plans  - Assess nutrition and hydration status and recommend course of action  - Evaluate amount of meals eaten  - Assist patient with eating if necessary   - Allow adequate time for meals  - Recommend/ encourage appropriate diets, oral nutritional supplements, and vitamin/mineral supplements  - Order, calculate, and assess calorie counts as needed  - Recommend, monitor, and adjust tube feedings and TPN/PPN based on assessed needs  - Assess need for intravenous fluids  - Provide specific nutrition/hydration education as appropriate  - Include patient/family/caregiver in decisions related to nutrition  Outcome: Not Progressing   Mostly isolative to her bed, minimally interacted with others, attended IMR group  Did not shower or do hygiene and looks disheveled  Ate 25% of breakfast and 25% of lunch  Med compliant  No issues noted  No somatic complaints voiced  Continue to monitor

## 2020-02-24 NOTE — PLAN OF CARE
Problem: Ineffective Coping  Goal: Participates in unit activities  Description  Interventions:  - Provide therapeutic environment   - Provide required programming   - Redirect inappropriate behaviors   Outcome: Not Progressing     Patient did not complete Goal Card for the week of 2/24/2020

## 2020-02-24 NOTE — PLAN OF CARE
Problem: Ineffective Coping  Goal: Participates in unit activities  Description  Interventions:  - Provide therapeutic environment   - Provide required programming   - Redirect inappropriate behaviors   Outcome: Not Progressing   Patient attending groups and does participate on a regular basis  Patient currently at 50% attendance however, refrains from doing a WRAP Plan or goal planning  Patient encouraged to keep attendance of groups at at least 50%  Writer will work with Patient to complete WRAP Plan and [address] Relapse Prevention plan by the time of discharge

## 2020-02-25 NOTE — PROGRESS NOTES
02/25/20 0935   Team Meeting   Meeting Type Tx Team Meeting   Initial Conference Date 02/25/20   Next Conference Date 03/03/20   Team Members Present   Team Members Present Physician;Nurse;; Other (Discipline and Name)   Physician Team Member Dr Romie Drake Team Member Alison Regalado, RN   Care Management Team Member Brenda Fowler   Other (Discipline and Name) Alexis Winslow LCSW; Kenrashaun Herbert, [facility] REYES   Patient/Family Present   Patient Present Yes   Patient's Family Present No     Patient attended treatment team meeting this morning without her self assessment completed  Patient attended 50% of groups offered last week  Patient is scheduled to go to the park today due to her reporting she does well in open spaces  Patient revised her statement that open spaces where there is a lot of concrete sets her off  Patient did try to get the treatment team to allow her to get coffee, however, treatment team reminded her that it is a trip to the park only  Team and patient completed risk assessment and the patient did not verbalize any desire to elope from the program  Patient verbalized understanding of consequences of eloping from treatment while on a commitment  Patient verbalized no further questions or concerns at the conclusion of the meeting

## 2020-02-25 NOTE — ASSESSMENT & PLAN NOTE
Patient is still waiting to hear from the [facility] as the supposed to see her for an intake interview today  She remains somewhat disheveled poorly groomed with hair uncombed and attends only half of the groups  Her hygiene is not that great  She prefers to spend a lot of time laying on her bed right after meal time  However she does get up and sit up on her bed whenever approached by this writer  She still expresses some vague paranoia about the staff here tampering with her oxygen concentrated which is a fixated believe  She continues to talk in a stilted manner as if in a court of law  She is preoccupied and at times paranoid but does not admit to any overt delusional believes  Compliant with medications no side effects reported  Anxious to go outside with staff today to the park    Current medications:    Current Facility-Administered Medications:     acetaminophen (TYLENOL) tablet 325 mg, 325 mg, Oral, Q6H PRN, Sindy Day MD    acetaminophen (TYLENOL) tablet 650 mg, 650 mg, Oral, Q6H PRN, Sindy Day MD    acetaminophen (TYLENOL) tablet 650 mg, 650 mg, Oral, Q8H PRN, Sindy Day MD    albuterol (PROVENTIL HFA,VENTOLIN HFA) inhaler 2 puff, 2 puff, Inhalation, Q4H PRN, Sindy Day MD, 2 puff at 10/11/19 0424    aluminum-magnesium hydroxide-simethicone (MYLANTA) 200-200-20 mg/5 mL oral suspension 15 mL, 15 mL, Oral, Q4H PRN, Sindy Day MD, 15 mL at 01/26/20 1021    ammonium lactate (LAC-HYDRIN) 12 % lotion 1 application, 1 application, Topical, BID PRN, Sindy Day MD    bacitracin topical ointment 1 small application, 1 small application, Topical, Daily, Dulce Maria Forth, CRNP, 1 small application at 44/65/30 1730    benzonatate (TESSALON PERLES) capsule 100 mg, 100 mg, Oral, TID PRN, Sindy Day MD    benztropine (COGENTIN) injection 1 mg, 1 mg, Intramuscular, Q8H PRN, Sindy Day MD    carbamide peroxide [facility]) 6 5 % otic solution 5 drop, 5 drop, Left Ear, BID PRN, Everett Gilliland Sarah Rodriguez MD    cloZAPine (CLOZARIL) tablet 25 mg, 25 mg, Oral, BID, Deep Durand MD, 25 mg at 02/24/20 2137    cloZAPine (CLOZARIL) tablet 50 mg, 50 mg, Oral, BID, Deep Durand MD, 50 mg at 02/24/20 2137    docusate sodium (COLACE) capsule 100 mg, 100 mg, Oral, BID PRN, Deep Durand MD    EPINEPHrine PF (ADRENALIN) 1 mg/mL injection 0 15 mg, 0 15 mg, Intramuscular, Once PRN, Deep Durand MD    fluticasone-vilanterol (BREO ELLIPTA) 200-25 MCG/INH inhaler 1 puff, 1 puff, Inhalation, Daily, Deep Durand MD, 1 puff at 02/24/20 0802    ketotifen (ZADITOR) 0 025 % ophthalmic solution 1 drop, 1 drop, Right Eye, BID PRN, Deep Durand MD    levothyroxine tablet 125 mcg, 125 mcg, Oral, Early Morning, Deep Durand MD, 125 mcg at 02/25/20 0606    magnesium hydroxide (MILK OF MAGNESIA) 400 mg/5 mL oral suspension 30 mL, 30 mL, Oral, Daily PRN, Deep Durand MD    montelukast (SINGULAIR) tablet 10 mg, 10 mg, Oral, HS, Deep Durand MD, 10 mg at 02/22/20 2104    OLANZapine (ZyPREXA) IM injection 5 mg, 5 mg, Intramuscular, Q8H PRN, Deep Durand MD    OLANZapine (ZyPREXA) tablet 5 mg, 5 mg, Oral, Q8H PRN, Deep Durand MD    ondansetron (ZOFRAN-ODT) dispersible tablet 4 mg, 4 mg, Oral, Q6H PRN, Deep Durand MD, 4 mg at 12/09/19 1757    pantoprazole (PROTONIX) EC tablet 40 mg, 40 mg, Oral, Early Morning, Deep Durand MD, 40 mg at 02/25/20 0606    polyethylene glycol (MIRALAX) packet 17 g, 17 g, Oral, Daily PRN, Deep Durand MD    polyvinyl alcohol (LIQUIFILM TEARS) 1 4 % ophthalmic solution 1 drop, 1 drop, Both Eyes, Q3H PRN, Deep Durand MD    sertraline (ZOLOFT) tablet 175 mg, 175 mg, Oral, Daily, Deep Durand MD, 175 mg at 02/24/20 0803    sucralfate (CARAFATE) oral suspension 1,000 mg, 1,000 mg, Oral, BID, Cat Torres MD, 1,000 mg at 02/25/20 0606    theophylline (JEF-24) 24 hr capsule 200 mg, 200 mg, Oral, Daily, Deep Durand MD, 200 mg at 02/24/20 0804    tiotropium (SPIRIVA) capsule for inhaler 18 mcg, 18 mcg, Inhalation, Daily, Shi Pham MD, 18 mcg at 02/24/20 0802    traZODone (DESYREL) tablet 25 mg, 25 mg, Oral, HS PRN, Shi Pham MD  Justification if on more than two antipsychotics: not applicable  Side effects if any: none    Risks , benefits, side effects and precautions of medications discussed with patient and reminded patient to let us know any problems with medications     Objective:     Vital Signs:  Vitals:    02/24/20 0700 02/24/20 0705 02/24/20 1606 02/24/20 2000   BP: 114/74 102/63 112/62 92/58   BP Location: Left arm Left arm Left arm Left arm   Pulse: 89 78 92 71   Resp: 18 18 18 16   Temp: 97 7 °F (36 5 °C)  97 5 °F (36 4 °C) 97 8 °F (36 6 °C)   TempSrc: Temporal  Temporal Temporal   SpO2:  93% 93% 93%   Weight:       Height:         Appearance:  age appropriate and older than stated age suspicious, somewhat disheveled, preoccupied but with good eye ocntact   Behavior:  deviant, evasive and guarded with tendency to become suspicious    Speech:  delayed, increased latency of response and tangential    Mood:  anxious, euphoric and irritable    Affect:  increased in intensity, increased in range, mood-congruent and redirectable   Thought Process:  circumstantial, concrete, goal directed, illogical and perserverative with tendency to become stilted   Thought Content:  delusions  grandiose and persecutory still with some suspiciousness and preoccupation, no other overt delusions, no current s/h thoughts intent or plans,    Perceptual Disturbances: None    Risk Potential: Inability to care for herself and refusal to take showers regularly   Sensorium:  person, place, time/date, situation, day of week, month of year, year and time   Cognition:  grossly intact no language deficts   Consciousness:  alert and awake    Attention: fair   Intellect: average   Insight:  limited   Judgment: fair       Motor Activity: no abnormal movements         Recent Labs:  Results Reviewed     None          I/O Past 24 hours: No intake/output data recorded  No intake/output data recorded  Assessment / Plan:     Schizoaffective disorder, bipolar type ([address] )  Overall status:  improving    Reason for continued inpatient care: to assess ability to maintain improvement by attending to ADLs and taking showers regular and see how she does on outing with family after another outing with staff escort next week  Acceptance by patient: accepting now  Hopefulness in recovery: living at the [address] [place] [address] soon  Understanding of medications :  yes  Involved in reintegration process: attending groups and has off grounds and off unit privileges   Trusting in relatoinship with psychiatrist:  Georgia Ruelas now  Overall status :  No changes  Recommended Treatment:    Medication changes:  1) none today    Non-pharmacological treatments  1) Continue with individual therapy, group therapy, milieu therapy and occupational therapy using recovery principles and psycho-education about accepting illness and need for treatment   2) encourage to take showers twice a week and cooperate with treatment and adl skills as per her behav  plan  3) another pass with staff to community today again before pass with sister to see if she would fully comply with assigned outing before pass to community with sister  4) Prepare for interview for intake with [place] [facility]   Safety  1) Safety/communication plan established targeting dynamic risk factors above  Discharge Plan to a [facility] at [address] / Coordination of Care    Total floor / unit time spent today 15 minutes  Greater than 50% of total time was spent with the patient and / or family counseling and / or coordination of care  Receptive to listening and learning coping skills for management of symptoms and attending to ADLs       Patient's Rights, confidentiality and exceptions to confidentiality, use of automated medical record, Jeb Patrick staff access to medical record, and consent to treatment reviewed      Krystyna Mcclain MD

## 2020-02-25 NOTE — PLAN OF CARE
Problem: Alteration in Thoughts and Perception  Goal: Verbalize thoughts and feelings  Description  Interventions:  - Promote a nonjudgmental and trusting relationship with the patient through active listening and therapeutic communication  - Assess patient's level of functioning, behavior and potential for risk  - Engage patient in 1 on 1 interactions for a minimum of 15 minutes each session  - Encourage patient to express fears, feelings, frustrations, and discuss symptoms    - [place] patient to reality, help patient recognize reality-based thinking   - Administer medications as ordered and assess for potential side effects  - Provide the patient education related to the signs and symptoms of the illness and desired effects of prescribed medications  Outcome: Progressing  Goal: Agree to be compliant with medication regime, as prescribed and report medication side effects  Description  Interventions:  - Offer appropriate PRN medication and supervise ingestion; conduct aims, as needed   Outcome: Progressing  Goal: Complete daily ADLs, including personal hygiene independently, as able  Description  Interventions:  - Observe, teach, and assist patient with ADLS  - Monitor and promote a balance of rest/activity, with adequate nutrition and elimination   Outcome: Progressing     Problem: Depression  Goal: Refrain from isolation  Description  Interventions:  - Develop a trusting relationship   - Encourage socialization   Outcome: Loli Gold is verbal of wanting to "Get it over with" in reference to pass out into community tomorrow  Hoping will then be able to go out w/her sister  Motivated to shower & groom so prepared for the day tomorrow  Was setup, given help only w/haircare & back  Pleased process went smoothly & feeling accomplished afterwards  Came up on own for supper medicine  Ate 50% of meal, egg salad, pears, Ensure   Sat out in [place] & dining room to socialize w/peers, enjoyed sharing in humor, laughter w/select peers

## 2020-02-25 NOTE — TREATMENT TEAM
02/25/20 1100   Activity/Group Checklist   Group Other (Comment)  (IMR)   Attendance Did not attend     Patient did not attend group today  Patient was encouraged to attend group, but declined

## 2020-02-25 NOTE — TREATMENT TEAM
Not scheduled to attend      02/25/20 1430   Activity/Group Checklist   Group   (Community Progarmming Wrap Up )   Attendance Did not attend

## 2020-02-25 NOTE — PROGRESS NOTES
Patient slept throughout the night, utilizes 1L humidified oxygen via NC  No s/s of respiratory distress  No behaviors or issues

## 2020-02-25 NOTE — PROGRESS NOTES
[address] took part in PM Group, had an HS snack, came up for HS medicine except the Singulair  Wearing now her QHS humidified nasal O2 @ 1L for bed

## 2020-02-25 NOTE — ASSESSMENT & PLAN NOTE
Patient was disappointed yesterday because she could not go to the off unit activity to the park with staff as was the pre planned because she refused to attend to her ADLs and refused the the been and staff felt that she was not appropriately groomed or dressed to go out  This matter was relate to her today including the expectations when she is supposed to go out to the community as she has been on this unit for more than 7 months already  She is anticipating to meet with the [facility] staff for an intake tomorrow and she was reminded to keep attending groups rather than be defiant and oppositional   He no overt delusions elicited but still somewhat act is a tree to certain staff of tampering with her oxygen concentrator  Able to go to activities without using her nasal oxygen  No longer believes that there is a micro chip under 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  Eating well sleeping well but continues to have psychosomatic symptoms and claims he cannot breathe or eat well at times  She is well groomed well kempt today but hair is uncombed      Current medications:    Current Facility-Administered Medications:     acetaminophen (TYLENOL) tablet 325 mg, 325 mg, Oral, Q6H PRN, Dalton Garner MD    acetaminophen (TYLENOL) tablet 650 mg, 650 mg, Oral, Q6H PRN, Dalton Garner MD    acetaminophen (TYLENOL) tablet 650 mg, 650 mg, Oral, Q8H PRN, Dalton Garner MD    albuterol (PROVENTIL HFA,VENTOLIN HFA) inhaler 2 puff, 2 puff, Inhalation, Q4H PRN, Dalton Garner MD, 2 puff at 10/11/19 0424    aluminum-magnesium hydroxide-simethicone (MYLANTA) 200-200-20 mg/5 mL oral suspension 15 mL, 15 mL, Oral, Q4H PRN, Dalton Garner MD, 15 mL at 01/26/20 1021    ammonium lactate (LAC-HYDRIN) 12 % lotion 1 application, 1 application, Topical, BID PRN, Dalton Garner MD    bacitracin topical ointment 1 small application, 1 small application, Topical, Daily, Arnie LinerABILIO, 1 small application at 24/07/93 6496   benzonatate (TESSALON PERLES) capsule 100 mg, 100 mg, Oral, TID PRN, Sindy Day MD    benztropine (COGENTIN) injection 1 mg, 1 mg, Intramuscular, Q8H PRN, Sindy Day MD    carbamide peroxide (DEBROX) 6 5 % otic solution 5 drop, 5 drop, Left Ear, BID PRN, Lena Dunbar MD    cloZAPine (CLOZARIL) tablet 25 mg, 25 mg, Oral, BID, Sindy Day MD, 25 mg at 02/24/20 2137    cloZAPine (CLOZARIL) tablet 50 mg, 50 mg, Oral, BID, Sindy Day MD, 50 mg at 02/24/20 2137    docusate sodium (COLACE) capsule 100 mg, 100 mg, Oral, BID PRN, Sindy Day MD    EPINEPHrine PF (ADRENALIN) 1 mg/mL injection 0 15 mg, 0 15 mg, Intramuscular, Once PRN, Sindy Day MD    fluticasone-vilanterol (BREO ELLIPTA) 200-25 MCG/INH inhaler 1 puff, 1 puff, Inhalation, Daily, Sindy Day MD, 1 puff at 02/25/20 0838    ketotifen (ZADITOR) 0 025 % ophthalmic solution 1 drop, 1 drop, Right Eye, BID PRN, Sindy Day MD    levothyroxine tablet 125 mcg, 125 mcg, Oral, Early Morning, Sindy Day MD, 125 mcg at 02/25/20 0606    magnesium hydroxide (MILK OF MAGNESIA) 400 mg/5 mL oral suspension 30 mL, 30 mL, Oral, Daily PRN, Sindy Day MD    montelukast (SINGULAIR) tablet 10 mg, 10 mg, Oral, HS, Sindy Day MD, 10 mg at 02/22/20 2104    OLANZapine (ZyPREXA) IM injection 5 mg, 5 mg, Intramuscular, Q8H PRN, Sindy Day MD    OLANZapine (ZyPREXA) tablet 5 mg, 5 mg, Oral, Q8H PRN, Sindy Day MD    ondansetron (ZOFRAN-ODT) dispersible tablet 4 mg, 4 mg, Oral, Q6H PRN, Sindy Day MD, 4 mg at 12/09/19 1757    pantoprazole (PROTONIX) EC tablet 40 mg, 40 mg, Oral, Early Morning, Sindy Day MD, 40 mg at 02/25/20 0606    polyethylene glycol (MIRALAX) packet 17 g, 17 g, Oral, Daily PRN, Sindy Day MD    polyvinyl alcohol (LIQUIFILM TEARS) 1 4 % ophthalmic solution 1 drop, 1 drop, Both Eyes, Q3H PRN, Sindy Day MD    sertraline (ZOLOFT) tablet 175 mg, 175 mg, Oral, Daily, Sindy Day MD, 175 mg at 02/25/20 0838    sucralfate (CARAFATE) oral suspension 1,000 mg, 1,000 mg, Oral, BID, Cat Torres MD, 1,000 mg at 02/25/20 0606    theophylline (JEF-24) 24 hr capsule 200 mg, 200 mg, Oral, Daily, Tree Madden MD, 200 mg at 02/25/20 0567    tiotropium [facility]) capsule for inhaler 18 mcg, 18 mcg, Inhalation, Daily, Tree Madden MD, 18 mcg at 02/25/20 6957    traZODone (DESYREL) tablet 25 mg, 25 mg, Oral, HS PRN, Tree Madden MD  Justification if on more than two antipsychotics: not applicable  Side effects if any: none    Risks , benefits, side effects and precautions of medications discussed with patient and reminded patient to let us know any problems with medications     Objective:     Vital Signs:  Vitals:    02/24/20 0705 02/24/20 1606 02/24/20 2000 02/25/20 0730   BP: 102/63 112/62 92/58 119/65   BP Location: Left arm Left arm Left arm Left arm   Pulse: 78 92 71 88   Resp: 18 18 16 19   Temp:  97 5 °F (36 4 °C) 97 8 °F (36 6 °C) (!) 96 8 °F (36 °C)   TempSrc:  Temporal Temporal Temporal   SpO2: 93% 93% 93%    Weight:       Height:         Appearance:  age appropriate and older than stated age suspicious, disheveled, preoccupied, better eye contact  Poorly groomed   Behavior:  deviant, evasive and guarded becoming argumentative and suspicious   Speech:  delayed, increased latency of response and tangential    Mood:  anxious, euphoric and irritable    Affect:  increased in intensity, increased in range, mood-congruent and redirectable   Thought Process:  circumstantial, concrete, goal directed, illogical and perserverative with tendency to become stilted   Thought Content:  delusions  grandiose and persecutory no current suicidal homicidal thoughts intent or plans  No preoccupation with violence  Still with psychosomatic symptoms of having difficulty to eat and to breathe and for afraid of getting choked and dying, he no phobias obsessions or compulsions  No current suicidal homicidal thoughts intent or plans       Perceptual Disturbances: None    Risk Potential: Inability to care for herself and refusal to take showers regularly   Sensorium:  person, place, time/date, situation, day of week, month of year, year and time   Cognition:  grossly intact no language deficit, no deficit in recent or remote memory, aware of current events   Consciousness:  alert and awake    Attention: Fair   Intellect: Average   Insight:  Limited   Judgment: fair       Motor Activity: no abnormal movements         Recent Labs:  Results Reviewed     None          I/O Past 24 hours:  No intake/output data recorded  No intake/output data recorded  Assessment / Plan:     Schizoaffective disorder, bipolar type ([facility] [address] )  Overall status:  improving    Reason for continued inpatient care: to assess ability to maintain improvement by attending to ADLs and taking showers regular and see how she does on outing with family after another outing with staff escort next week  Acceptance by patient: accepting now  Hopefulness in recovery: living at the [facility] soon  Understanding of medications :  yes  Involved in reintegration process: attending groups and has off grounds and off unit privileges   Trusting in relatoinship with psychiatrist:  Khalif Elliott now  Overall status :  No changes  Recommended Treatment:    Medication changes:  1) none today    Non-pharmacological treatments  1) Continue with individual therapy, group therapy, milieu therapy and occupational therapy using recovery principles and psycho-education about accepting illness and need for treatment     2) encourage to take showers twice a week and cooperate with treatment and adl skills as per her behav  plan  3) another pass with staff to community today again before pass with sister to see if she would fully comply with assigned outing before pass to community with sister  4) [facility]  1) Safety/communication plan established targeting dynamic risk factors above   Discharge Plan to a [facility] at [address] / Coordination of Care    Total floor / unit time spent today 15 minutes  Greater than 50% of total time was spent with the patient and / or family counseling and / or coordination of care  Receptive to listening and learning coping skills for management of symptoms and attending to ADLs  Patient's Rights, confidentiality and exceptions to confidentiality, use of automated medical record, [address] [place] [address] staff access to medical record, and consent to treatment reviewed      Felisa Florence MD

## 2020-02-25 NOTE — PLAN OF CARE
Problem: Alteration in Thoughts and Perception  Goal: Verbalize thoughts and feelings  Description  Interventions:  - Promote a nonjudgmental and trusting relationship with the patient through active listening and therapeutic communication  - Assess patient's level of functioning, behavior and potential for risk  - Engage patient in 1 on 1 interactions for a minimum of 15 minutes each session  - Encourage patient to express fears, feelings, frustrations, and discuss symptoms    - [place] patient to reality, help patient recognize reality-based thinking   - Administer medications as ordered and assess for potential side effects  - Provide the patient education related to the signs and symptoms of the illness and desired effects of prescribed medications  Outcome: Progressing  Goal: Agree to be compliant with medication regime, as prescribed and report medication side effects  Description  Interventions:  - Offer appropriate PRN medication and supervise ingestion; conduct aims, as needed   Outcome: Progressing  Goal: Attend and participate in unit activities, including therapeutic, recreational, and educational groups  Description  Interventions:  - Provide therapeutic and educational activities daily, encourage attendance and participation, and document same in the medical record     CERTIFIED PEER SPECIALIST INTERVENTIONS:    Complete peer assessment with patient to assess their needs and identify their goals to complete while in the recovery program as well as once discharged into the community  Patient will complete WRAP Plan, Crisis Plan and [facility] Domains  Patient will attend 50% of groups offered on the unit  Patient will complete a goal card weekly      Outcome: Not Progressing     Problem: Risk for Self Injury/Neglect  Goal: Complete daily ADLs, including personal hygiene independently, as able  Description  Interventions:  - Observe, teach, and assist patient with ADLS  - Monitor and promote a balance of rest/activity, with adequate nutrition and elimination  Outcome: Not Progressing     Problem: Depression  Goal: Refrain from isolation  Description  Interventions:  - Develop a trusting relationship   - Encourage socialization   Outcome: Jannette Harris has been isolative to herself in her room for the majority of the shift  She was compliant with her scheduled medications and ate breakfast but remained in her room napping throughout the shift with little to no interactions with peers or with staff  Was unable to go on her pass with staff due to weather conditions and not adhering to her behavorial plan subsequently becoming irritable towards staff and then returned to her room to sleep  No groups attended and she chose not eat lunch  Will continue to monitor for changes in behavior

## 2020-02-25 NOTE — PROGRESS NOTES
Progress Note - Homero Massey 1955, 60 y o  female MRN: [id number]    Unit/Bed#: [facility] 110-02 Encounter: [id number]    Primary Care Provider: Millicent Neal PA-C   Date and time admitted to hospital: 7/23/2019  5:30 PM        * Schizoaffective disorder, bipolar type [facility])  Assessment & Plan  Patient is still waiting to hear from the [facility] as the supposed to see her for an intake interview today  She remains somewhat disheveled poorly groomed with hair uncombed and attends only half of the groups  Her hygiene is not that great  She prefers to spend a lot of time laying on her bed right after meal time  However she does get up and sit up on her bed whenever approached by this writer  She still expresses some vague paranoia about the staff here tampering with her oxygen concentrated which is a fixated believe  She continues to talk in a stilted manner as if in a court of law  She is preoccupied and at times paranoid but does not admit to any overt delusional believes  Compliant with medications no side effects reported  Anxious to go outside with staff today to the park    Current medications:    Current Facility-Administered Medications:     acetaminophen (TYLENOL) tablet 325 mg, 325 mg, Oral, Q6H PRN, Allie Guzman MD    acetaminophen (TYLENOL) tablet 650 mg, 650 mg, Oral, Q6H PRN, Allie Guzman MD    acetaminophen (TYLENOL) tablet 650 mg, 650 mg, Oral, Q8H PRN, Allie Guzman MD    albuterol (PROVENTIL HFA,VENTOLIN HFA) inhaler 2 puff, 2 puff, Inhalation, Q4H PRN, Allie Guzman MD, 2 puff at 10/11/19 0424    aluminum-magnesium hydroxide-simethicone (MYLANTA) 200-200-20 mg/5 mL oral suspension 15 mL, 15 mL, Oral, Q4H PRN, Allie Guzman MD, 15 mL at 01/26/20 1021    ammonium lactate (LAC-HYDRIN) 12 % lotion 1 application, 1 application, Topical, BID PRN, Allie Guzman MD    bacitracin topical ointment 1 small application, 1 small application, Topical, Daily, ABILIO Nieto, 1 small application at 17/41/86 1730    benzonatate (TESSALON PERLES) capsule 100 mg, 100 mg, Oral, TID PRN, Shilpa Trujillo MD    benztropine (COGENTIN) injection 1 mg, 1 mg, Intramuscular, Q8H PRN, Shilpa Trujillo MD    carbamide peroxide (DEBROX) 6 5 % otic solution 5 drop, 5 drop, Left Ear, BID PRN, Myles Patterson MD    cloZAPine (CLOZARIL) tablet 25 mg, 25 mg, Oral, BID, Shilpa Trujillo MD, 25 mg at 02/24/20 2137    cloZAPine (CLOZARIL) tablet 50 mg, 50 mg, Oral, BID, Shilpa Trujillo MD, 50 mg at 02/24/20 2137    docusate sodium (COLACE) capsule 100 mg, 100 mg, Oral, BID PRN, Shilpa Trujillo MD    EPINEPHrine PF (ADRENALIN) 1 mg/mL injection 0 15 mg, 0 15 mg, Intramuscular, Once PRN, Shilpa Trujillo MD    fluticasone-vilanterol (BREO ELLIPTA) 200-25 MCG/INH inhaler 1 puff, 1 puff, Inhalation, Daily, Shilpa Trujillo MD, 1 puff at 02/24/20 0802    ketotifen (ZADITOR) 0 025 % ophthalmic solution 1 drop, 1 drop, Right Eye, BID PRN, Shilpa Trujillo MD    levothyroxine tablet 125 mcg, 125 mcg, Oral, Early Morning, Shilpa Trujillo MD, 125 mcg at 02/25/20 0606    magnesium hydroxide (MILK OF MAGNESIA) 400 mg/5 mL oral suspension 30 mL, 30 mL, Oral, Daily PRN, Shilpa Trujillo MD    montelukast (SINGULAIR) tablet 10 mg, 10 mg, Oral, HS, Shilpa Trujillo MD, 10 mg at 02/22/20 2104    OLANZapine (ZyPREXA) IM injection 5 mg, 5 mg, Intramuscular, Q8H PRN, Shilpa Trujillo MD    OLANZapine (ZyPREXA) tablet 5 mg, 5 mg, Oral, Q8H PRN, Shilpa Trujillo MD    ondansetron (ZOFRAN-ODT) dispersible tablet 4 mg, 4 mg, Oral, Q6H PRN, Shilpa Trujillo MD, 4 mg at 12/09/19 1757    pantoprazole (PROTONIX) EC tablet 40 mg, 40 mg, Oral, Early Morning, Shilpa Trujillo MD, 40 mg at 02/25/20 0606    polyethylene glycol (MIRALAX) packet 17 g, 17 g, Oral, Daily PRN, Shilpa Trujillo MD    polyvinyl alcohol (LIQUIFILM TEARS) 1 4 % ophthalmic solution 1 drop, 1 drop, Both Eyes, Q3H PRN, Shilpa Trujillo MD    sertraline (ZOLOFT) tablet 175 mg, 175 mg, Oral, Daily, Shilpa Trujillo MD, 175 mg at 02/24/20 0803    sucralfate (CARAFATE) oral suspension 1,000 mg, 1,000 mg, Oral, BID, Temitope Peterson MD, 1,000 mg at 02/25/20 0606    theophylline (JEF-24) 24 hr capsule 200 mg, 200 mg, Oral, Daily, Vincent Olivares MD, 200 mg at 02/24/20 0804    tiotropium [facility]) capsule for inhaler 18 mcg, 18 mcg, Inhalation, Daily, Vincent Olivares MD, 18 mcg at 02/24/20 0802    traZODone (DESYREL) tablet 25 mg, 25 mg, Oral, HS PRN, Vincent Olivares MD  Justification if on more than two antipsychotics: not applicable  Side effects if any: none    Risks , benefits, side effects and precautions of medications discussed with patient and reminded patient to let us know any problems with medications     Objective:     Vital Signs:  Vitals:    02/24/20 0700 02/24/20 0705 02/24/20 1606 02/24/20 2000   BP: 114/74 102/63 112/62 92/58   BP Location: Left arm Left arm Left arm Left arm   Pulse: 89 78 92 71   Resp: 18 18 18 16   Temp: 97 7 °F (36 5 °C)  97 5 °F (36 4 °C) 97 8 °F (36 6 °C)   TempSrc: Temporal  Temporal Temporal   SpO2:  93% 93% 93%   Weight:       Height:         Appearance:  age appropriate and older than stated age suspicious, somewhat disheveled, preoccupied but with good eye ocntact   Behavior:  deviant, evasive and guarded with tendency to become suspicious    Speech:  delayed, increased latency of response and tangential    Mood:  anxious, euphoric and irritable    Affect:  increased in intensity, increased in range, mood-congruent and redirectable   Thought Process:  circumstantial, concrete, goal directed, illogical and perserverative with tendency to become stilted   Thought Content:  delusions  grandiose and persecutory still with some suspiciousness and preoccupation, no other overt delusions, no current s/h thoughts intent or plans,    Perceptual Disturbances: None    Risk Potential: Inability to care for herself and refusal to take showers regularly   Sensorium:  person, place, time/date, situation, day of week, month of year, year and time   Cognition:  grossly intact no language deficts   Consciousness:  alert and awake    Attention: fair   Intellect: average   Insight:  limited   Judgment: fair       Motor Activity: no abnormal movements         Recent Labs:  Results Reviewed     None          I/O Past 24 hours:  No intake/output data recorded  No intake/output data recorded  Assessment / Plan:     Schizoaffective disorder, bipolar type ([address] )  Overall status:  improving    Reason for continued inpatient care: to assess ability to maintain improvement by attending to ADLs and taking showers regular and see how she does on outing with family after another outing with staff escort next week  Acceptance by patient: accepting now  Hopefulness in recovery: living at the [facility] soon  Understanding of medications :  yes  Involved in reintegration process: attending groups and has off grounds and off unit privileges   Trusting in relatoinship with psychiatrist:  Reva Altamirano now  Overall status :  No changes  Recommended Treatment:    Medication changes:  1) none today    Non-pharmacological treatments  1) Continue with individual therapy, group therapy, milieu therapy and occupational therapy using recovery principles and psycho-education about accepting illness and need for treatment   2) encourage to take showers twice a week and cooperate with treatment and adl skills as per her behav  plan  3) another pass with staff to community scheduled today  today which was postponed again because she did not cooperate with taking her bin again before pass with sister to see if she would fully comply with assigned outing before pass to community with sister  4) Prepare for interview for intake with [facility]   Safety  1) Safety/communication plan established targeting dynamic risk factors above  Discharge Plan to a [facility] at [address]  / Coordination of Care    Total floor / unit time spent today 15 minutes  Greater than 50% of total time was spent with the patient and / or family counseling and / or coordination of care  Receptive to listening and learning coping skills for management of symptoms and attending to ADLs  Patient's Rights, confidentiality and exceptions to confidentiality, use of automated medical record, Jeb Patrick staff access to medical record, and consent to treatment reviewed      Jeffrey Gallegos MD

## 2020-02-25 NOTE — PROGRESS NOTES
02/25/20 0900   Team Meeting   Meeting Type Daily Rounds   Team Members Present   Team Members Present Physician;Nurse;; Other (Discipline and Name)   Physician Team Member Dr Chanell Martell, RN   Care Management Team Member Brenda Oliveros   Other (Discipline and Name) Estefania Miller LCSW     Patient showered on 3-11 shift  Scheduled to go to the park today, pending weather  Already trying to get the staff to deviate from the plans and take her for coffee  Says she wants to get this pass over with so she can go on a pass with her sister  Slept

## 2020-02-26 NOTE — PROGRESS NOTES
Progress Note - Selina Simon 1955, 60 y o  female MRN: [id number]    [facility] Encounter: [id number]    Primary Care Provider: Judith Morrissey PA-C   Date and time admitted to hospital: 7/23/2019  5:30 PM        * Schizoaffective disorder, bipolar type [facility])  Assessment & Plan  Patient was disappointed yesterday because she could not go to the off unit activity to the park with staff as was the pre planned because she refused to attend to her ADLs and refused the the been and staff felt that she was not appropriately groomed or dressed to go out  This matter was relate to her today including the expectations when she is supposed to go out to the community as she has been on this unit for more than 7 months already  She is anticipating to meet with the [facility] staff for an intake tomorrow and she was reminded to keep attending groups rather than be defiant and oppositional   He no overt delusions elicited but still somewhat act is a tree to certain staff of tampering with her oxygen concentrator  Able to go to activities without using her nasal oxygen  No longer believes that there is a micro chip under 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  Eating well sleeping well but continues to have psychosomatic symptoms and claims he cannot breathe or eat well at times  She is well groomed well kempt today but hair is uncombed      Current medications:    Current Facility-Administered Medications:     acetaminophen (TYLENOL) tablet 325 mg, 325 mg, Oral, Q6H PRN, Greer Beltran MD    acetaminophen (TYLENOL) tablet 650 mg, 650 mg, Oral, Q6H PRN, Greer Beltran MD    acetaminophen (TYLENOL) tablet 650 mg, 650 mg, Oral, Q8H PRN, Greer Beltran MD    albuterol (PROVENTIL HFA,VENTOLIN HFA) inhaler 2 puff, 2 puff, Inhalation, Q4H PRN, Greer Beltran MD, 2 puff at 10/11/19 0424    aluminum-magnesium hydroxide-simethicone (MYLANTA) 200-200-20 mg/5 mL oral suspension 15 mL, 15 mL, Oral, Q4H PRN, Jennifer Taveras MD, 15 mL at 01/26/20 1021    ammonium lactate (LAC-HYDRIN) 12 % lotion 1 application, 1 application, Topical, BID PRN, Jennifer Taveras MD    bacitracin topical ointment 1 small application, 1 small application, Topical, Daily, ABILIO Preston, 1 small application at 54/25/44 1730    benzonatate (TESSALON PERLES) capsule 100 mg, 100 mg, Oral, TID PRN, Jennifer Taveras MD    benztropine (COGENTIN) injection 1 mg, 1 mg, Intramuscular, Q8H PRN, Jennifer Taveras MD    carbamide peroxide (DEBROX) 6 5 % otic solution 5 drop, 5 drop, Left Ear, BID PRN, Sabina Lerner MD    cloZAPine (CLOZARIL) tablet 25 mg, 25 mg, Oral, BID, Jennifer Taveras MD, 25 mg at 02/24/20 2137    cloZAPine (CLOZARIL) tablet 50 mg, 50 mg, Oral, BID, Jennifer Taveras MD, 50 mg at 02/24/20 2137    docusate sodium (COLACE) capsule 100 mg, 100 mg, Oral, BID PRN, Jennifer Taveras MD    EPINEPHrine PF (ADRENALIN) 1 mg/mL injection 0 15 mg, 0 15 mg, Intramuscular, Once PRN, Jennifer Taveras MD    fluticasone-vilanterol (BREO ELLIPTA) 200-25 MCG/INH inhaler 1 puff, 1 puff, Inhalation, Daily, Jennifer Taveras MD, 1 puff at 02/25/20 0838    ketotifen (ZADITOR) 0 025 % ophthalmic solution 1 drop, 1 drop, Right Eye, BID PRN, Jennifer Taveras MD    levothyroxine tablet 125 mcg, 125 mcg, Oral, Early Morning, Jennifer Taveras MD, 125 mcg at 02/25/20 0606    magnesium hydroxide (MILK OF MAGNESIA) 400 mg/5 mL oral suspension 30 mL, 30 mL, Oral, Daily PRN, Jennifer Taveras MD    montelukast (SINGULAIR) tablet 10 mg, 10 mg, Oral, HS, Jennifer Taveras MD, 10 mg at 02/22/20 2104    OLANZapine (ZyPREXA) IM injection 5 mg, 5 mg, Intramuscular, Q8H PRN, Jennifer Taveras MD    OLANZapine (ZyPREXA) tablet 5 mg, 5 mg, Oral, Q8H PRN, Jennifer Taveras MD    ondansetron (ZOFRAN-ODT) dispersible tablet 4 mg, 4 mg, Oral, Q6H PRN, Jennifer Taveras MD, 4 mg at 12/09/19 1757    pantoprazole (PROTONIX) EC tablet 40 mg, 40 mg, Oral, Early Morning, Jennifer Taveras MD, 40 mg at 02/25/20 0606    polyethylene glycol (MIRALAX) packet 17 g, 17 g, Oral, Daily PRN, Sandhya Bolaños MD    polyvinyl alcohol (LIQUIFILM TEARS) 1 4 % ophthalmic solution 1 drop, 1 drop, Both Eyes, Q3H PRN, Sandhya Bolaños MD    sertraline (ZOLOFT) tablet 175 mg, 175 mg, Oral, Daily, Sandhya Bolaños MD, 175 mg at 02/25/20 3250    sucralfate (CARAFATE) oral suspension 1,000 mg, 1,000 mg, Oral, BID, Omar Jim MD, 1,000 mg at 02/25/20 0606    theophylline (JEF-24) 24 hr capsule 200 mg, 200 mg, Oral, Daily, Sandhya Bolaños MD, 200 mg at 02/25/20 4944    tiotropium [facility]) capsule for inhaler 18 mcg, 18 mcg, Inhalation, Daily, Sandhya Bolaños MD, 18 mcg at 02/25/20 3094    traZODone (DESYREL) tablet 25 mg, 25 mg, Oral, HS PRN, Sandhya Bolaños MD  Justification if on more than two antipsychotics: not applicable  Side effects if any: none    Risks , benefits, side effects and precautions of medications discussed with patient and reminded patient to let us know any problems with medications     Objective:     Vital Signs:  Vitals:    02/24/20 0705 02/24/20 1606 02/24/20 2000 02/25/20 0730   BP: 102/63 112/62 92/58 119/65   BP Location: Left arm Left arm Left arm Left arm   Pulse: 78 92 71 88   Resp: 18 18 16 19   Temp:  97 5 °F (36 4 °C) 97 8 °F (36 6 °C) (!) 96 8 °F (36 °C)   TempSrc:  Temporal Temporal Temporal   SpO2: 93% 93% 93%    Weight:       Height:         Appearance:  age appropriate and older than stated age suspicious, disheveled, preoccupied, better eye contact    Poorly groomed   Behavior:  deviant, evasive and guarded becoming argumentative and suspicious   Speech:  delayed, increased latency of response and tangential    Mood:  anxious, euphoric and irritable    Affect:  increased in intensity, increased in range, mood-congruent and redirectable   Thought Process:  circumstantial, concrete, goal directed, illogical and perserverative with tendency to become stilted   Thought Content:  delusions  grandiose and persecutory no current suicidal homicidal thoughts intent or plans  No preoccupation with violence  Still with psychosomatic symptoms of having difficulty to eat and to breathe and for afraid of getting choked and dying, he no phobias obsessions or compulsions  No current suicidal homicidal thoughts intent or plans  Perceptual Disturbances: None    Risk Potential: Inability to care for herself and refusal to take showers regularly   Sensorium:  person, place, time/date, situation, day of week, month of year, year and time   Cognition:  grossly intact no language deficit, no deficit in recent or remote memory, aware of current events   Consciousness:  alert and awake    Attention: Fair   Intellect: Average   Insight:  Limited   Judgment: fair       Motor Activity: no abnormal movements         Recent Labs:  Results Reviewed     None          I/O Past 24 hours:  No intake/output data recorded  No intake/output data recorded  Assessment / Plan:     Schizoaffective disorder, bipolar type ([address] )  Overall status:  improving    Reason for continued inpatient care: to assess ability to maintain improvement by attending to ADLs and taking showers regular and see how she does on outing with family after another outing with staff escort next week  Acceptance by patient: accepting now  Hopefulness in recovery: living at the [facility]  Understanding of medications :  yes  Involved in reintegration process: attending groups and has off grounds and off unit privileges   Trusting in relatoinship with psychiatrist:  Emeterio Caraballo now  Overall status :  No changes  Recommended Treatment:    Medication changes:  1) none today    Non-pharmacological treatments  1) Continue with individual therapy, group therapy, milieu therapy and occupational therapy using recovery principles and psycho-education about accepting illness and need for treatment     2) encourage to take showers twice a week and cooperate with treatment and adl skills as per her behav  plan  3) another pass with staff to community today again before pass with sister to see if she would fully comply with assigned outing before pass to [facility] to [facility] with sister  4) Prepare for interview for intake with [facility]   Safety  1) Safety/communication plan established targeting dynamic risk factors above  Discharge Plan to a [facility] at [address] / Coordination of Care    Total floor / unit time spent today 15 minutes  Greater than 50% of total time was spent with the patient and / or family counseling and / or coordination of care  Receptive to listening and learning coping skills for management of symptoms and attending to ADLs  Patient's Rights, confidentiality and exceptions to confidentiality, use of automated medical record, [facility] staff access to medical record, and consent to treatment reviewed      Dalton Garner MD

## 2020-02-26 NOTE — PROGRESS NOTES
02/26/20 5099   Activity/Group Checklist   Group Other (Comment)  (Graduation)   Attendance Did not attend     Patient was personally prompted by this therapist to attend a peer's graduation  Patient reported she would not be attending and put her back to therapist   Patient will not lose credit for group because the group was unscheduled

## 2020-02-26 NOTE — PROGRESS NOTES
02/26/20 0800   Team Meeting   Meeting Type Daily Rounds   Team Members Present   Team Members Present Physician;Nurse;; Other (Discipline and Name)   Physician Team Member Dr Rosa Jimenez Team Member Jewels Snyder RN   Care Management Team Member Brenda So   Other (Discipline and Name) Cesar Bhatt LCSW; Meryle Laws, CPS     Patient was depressed after learning she was not able to go out on her pass yesterday due to her not completing her hygiene that morning  Slept

## 2020-02-26 NOTE — PROGRESS NOTES
Maggie maintained on ongoing fall and SAFE precaution   Laying in bed with eyes closed, breath even and unlabored   On O2 with humidifier @1L/m via nasal cannula   Q 7 minutes rounding continues   No somatic complaint overnight  No PRN needed for sleep aid   No indication of pain or discomfort   No respiratory distress   Will continue to monitor

## 2020-02-26 NOTE — TREATMENT TEAM
02/26/20 1100   Activity/Group Checklist   Group   (IMR/Personal Barriers to Recovery )   Attendance Attended   Attendance Duration (min) 46-60   Interactions Interacted appropriately   Affect/Mood Wide   Goals Achieved Able to engage in interactions; Identified feelings

## 2020-02-26 NOTE — TREATMENT TEAM
Patient declined      02/25/20 1500   Activity/Group Checklist   Group   (Recovery Workshop )   Attendance Did not attend   Attendance Duration (min) 46-60   Affect/Mood IRMA

## 2020-02-26 NOTE — PROGRESS NOTES
2145 Hieu Mayberry did not attend PM Group, she declined HS snack, she refused Incentive Spirometer  "I'm depressed " Presented to her need to practice resiliency in the face of disappointment; don't take to bed for three days like she did last time a community pass didn't go well  Assures this nurse that she will be up & active again tomorrow  Wearing now her QHS humidified nasal O2 @ 1L for bed

## 2020-02-26 NOTE — PLAN OF CARE
Problem: Alteration in Thoughts and Perception  Goal: Treatment Goal: Gain control of psychotic behaviors/thinking, reduce/eliminate presenting symptoms and demonstrate improved reality functioning upon discharge  Outcome: Progressing  Goal: Verbalize thoughts and feelings  Description  Interventions:  - Promote a nonjudgmental and trusting relationship with the patient through active listening and therapeutic communication  - Assess patient's level of functioning, behavior and potential for risk  - Engage patient in 1 on 1 interactions for a minimum of 15 minutes each session  - Encourage patient to express fears, feelings, frustrations, and discuss symptoms    - [place] patient to reality, help patient recognize reality-based thinking   - Administer medications as ordered and assess for potential side effects  - Provide the patient education related to the signs and symptoms of the illness and desired effects of prescribed medications  Outcome: Progressing  Goal: Agree to be compliant with medication regime, as prescribed and report medication side effects  Description  Interventions:  - Offer appropriate PRN medication and supervise ingestion; conduct aims, as needed   Outcome: Progressing  Goal: Attend and participate in unit activities, including therapeutic, recreational, and educational groups  Description  Interventions:  - Provide therapeutic and educational activities daily, encourage attendance and participation, and document same in the medical record     CERTIFIED PEER SPECIALIST INTERVENTIONS:    Complete peer assessment with patient to assess their needs and identify their goals to complete while in the recovery program as well as once discharged into the community  Patient will complete WRAP Plan, Crisis Plan and 5 Life Domains  Patient will attend 50% of groups offered on the unit  Patient will complete a goal card weekly      Outcome: Progressing  Goal: Recognize dysfunctional thoughts, communicate reality-based thoughts at the time of discharge  Description  Interventions:  - Provide medication and psycho-education to assist patient in compliance and developing insight into his/her illness   Outcome: Progressing     Problem: Ineffective Coping  Goal: Participates in unit activities  Description  Interventions:  - Provide therapeutic environment   - Provide required programming   - Redirect inappropriate behaviors   Outcome: Progressing  Goal: Patient/Family verbalizes awareness of resources  Outcome: Progressing  Goal: Understands least restrictive measures  Description  Interventions:  - Utilize least restrictive behavior  Outcome: Progressing     Problem: Risk for Self Injury/Neglect  Goal: Treatment Goal: Remain safe during length of stay, learn and adopt new coping skills, and be free of self-injurious ideation, impulses and acts at the time of discharge  Outcome: Progressing  Goal: Verbalize thoughts and feelings  Description  Interventions:  - Assess and re-assess patient's lethality and potential for self-injury  - Engage patient in 1:1 interactions, daily, for a minimum of 15 minutes  - Encourage patient to express feelings, fears, frustrations, hopes  - Establish rapport/trust with patient   Outcome: Progressing  Goal: Refrain from harming self  Description  Interventions:  - Monitor patient closely, per order  - Develop a trusting relationship  - Supervise medication ingestion, monitor effects and side effects   Outcome: Progressing  Goal: Attend and participate in unit activities, including therapeutic, recreational, and educational groups  Description  Interventions:  - Provide therapeutic and educational activities daily, encourage attendance and participation, and document same in the medical record  - Obtain collateral information, encourage visitation and family involvement in care   Outcome: Progressing     Problem: Depression  Goal: Treatment Goal: Demonstrate behavioral control of depressive symptoms, verbalize feelings of improved mood/affect, and adopt new coping skills prior to discharge  Outcome: Progressing  Goal: Verbalize thoughts and feelings  Description  Interventions:  - Assess and re-assess patient's level of risk   - Engage patient in 1:1 interactions, daily, for a minimum of 15 minutes   - Encourage patient to express feelings, fears, frustrations, hopes   Outcome: Progressing     Problem: Alteration in Orientation  Goal: Interact with staff daily  Description  Interventions:  - Assess and re-assess patient's level of orientation  - Engage patient in 1 on 1 interactions, daily, for a minimum of 15 minutes   - Establish rapport/trust with patient   Outcome: Progressing     Problem: PAIN - ADULT  Goal: Verbalizes/displays adequate comfort level or baseline comfort level  Description  Interventions:  - Encourage patient to monitor pain and request assistance  - Assess pain using appropriate pain scale  - Administer analgesics based on type and severity of pain and evaluate response  - Implement non-pharmacological measures as appropriate and evaluate response  - Consider cultural and social influences on pain and pain management  - Notify physician/advanced practitioner if interventions unsuccessful or patient reports new pain  Outcome: Progressing     Problem: SAFETY ADULT  Goal: Patient will remain free of falls  Description  INTERVENTIONS:  - Assess patient frequently for physical needs  -  Identify cognitive and physical deficits and behaviors that affect risk of falls    -  [place] fall precautions as indicated by assessment   - Educate patient/family on patient safety including physical limitations  - Instruct patient to call for assistance with activity based on assessment  - Modify environment to reduce risk of injury  - Consider OT/PT consult to assist with strengthening/mobility  Outcome: Progressing     Problem: Nutrition/Hydration-ADULT  Goal: Nutrient/Hydration intake appropriate for improving, restoring or maintaining nutritional needs  Description  Monitor and assess patient's nutrition/hydration status for malnutrition  Collaborate with interdisciplinary team and initiate plan and interventions as ordered  Monitor patient's weight and dietary intake as ordered or per policy  Utilize nutrition screening tool and intervene as necessary  Determine patient's food preferences and provide high-protein, high-caloric foods as appropriate  INTERVENTIONS:  - Monitor oral intake, urinary output, labs, and treatment plans  - Assess nutrition and hydration status and recommend course of action  - Evaluate amount of meals eaten  - Assist patient with eating if necessary   - Allow adequate time for meals  - Recommend/ encourage appropriate diets, oral nutritional supplements, and vitamin/mineral supplements  - Order, calculate, and assess calorie counts as needed  - Recommend, monitor, and adjust tube feedings and TPN/PPN based on assessed needs  - Assess need for intravenous fluids  - Provide specific nutrition/hydration education as appropriate  - Include patient/family/caregiver in decisions related to nutrition  Outcome: Not Progressing   Mostly isolative to her bed, minimally interacted with others, attended IMR group  Did not shower or do hygiene and looks disheveled  Ate 25% of breakfast and 10% of lunch  Med compliant  No issues noted  No somatic complaints voiced  Continue to monitor

## 2020-02-26 NOTE — PLAN OF CARE
Problem: Alteration in Thoughts and Perception  Goal: Verbalize thoughts and feelings  Description  Interventions:  - Promote a nonjudgmental and trusting relationship with the patient through active listening and therapeutic communication  - Assess patient's level of functioning, behavior and potential for risk  - Engage patient in 1 on 1 interactions for a minimum of 15 minutes each session  - Encourage patient to express fears, feelings, frustrations, and discuss symptoms    - [place] patient to reality, help patient recognize reality-based thinking   - Administer medications as ordered and assess for potential side effects  - Provide the patient education related to the signs and symptoms of the illness and desired effects of prescribed medications  Outcome: Progressing  Goal: Agree to be compliant with medication regime, as prescribed and report medication side effects  Description  Interventions:  - Offer appropriate PRN medication and supervise ingestion; conduct aims, as needed   Outcome: Progressing     Problem: Nutrition/Hydration-ADULT  Goal: Nutrient/Hydration intake appropriate for improving, restoring or maintaining nutritional needs  Description  Monitor and assess patient's nutrition/hydration status for malnutrition  Collaborate with interdisciplinary team and initiate plan and interventions as ordered  Monitor patient's weight and dietary intake as ordered or per policy  Utilize nutrition screening tool and intervene as necessary  Determine patient's food preferences and provide high-protein, high-caloric foods as appropriate       INTERVENTIONS:  - Monitor oral intake, urinary output, labs, and treatment plans  - Assess nutrition and hydration status and recommend course of action  - Evaluate amount of meals eaten  - Assist patient with eating if necessary   - Allow adequate time for meals  - Recommend/ encourage appropriate diets, oral nutritional supplements, and vitamin/mineral supplements  - Order, calculate, and assess calorie counts as needed  - Recommend, monitor, and adjust tube feedings and TPN/PPN based on assessed needs  - Assess need for intravenous fluids  - Provide specific nutrition/hydration education as appropriate  - Include patient/family/caregiver in decisions related to nutrition  Outcome: Progressing     Problem: Depression  Goal: Refrain from isolation  Description  Interventions:  - Develop a trusting relationship   - Encourage socialization   Outcome: Not Progressing     [address] Rausch is quite annoyed & disappointed the pass to community did not transpire today "because I didn't wash my face & scrub my teeth"  Also said was chided for not putting on different clothes after having slept in last evening's fresh outfit  Points out that all peers on unit sleep in their clothes & this hasn't been raised as an issue w/them  Presented to her that now knows clearly the expectations, should consider meeting them next pass opportunity so can expedite her situation, move forward  She is isolative to room & bed in free time  Did come out for supper medicine, ate 75% of meal w/an Ensure

## 2020-02-27 NOTE — PROGRESS NOTES
02/27/20 0800   Team Meeting   Meeting Type Daily Rounds   Team Members Present   Team Members Present Physician;Nurse;; Other (Discipline and Name)   Physician Team Member Dr Jeannette Jiang Team Member Arnaud Gutierrez RN   Care Management Team Member Brenda Hay   Other (Discipline and Name) Porfirio Javier LCSW; SHANIKA Walker     Patient's last shower was on 2/24/20  Poor appetite for supper  To have visit with [facility] today

## 2020-02-27 NOTE — PLAN OF CARE
Problem: Alteration in Thoughts and Perception  Goal: Treatment Goal: Gain control of psychotic behaviors/thinking, reduce/eliminate presenting symptoms and demonstrate improved reality functioning upon discharge  Outcome: Progressing  Goal: Verbalize thoughts and feelings  Description  Interventions:  - Promote a nonjudgmental and trusting relationship with the patient through active listening and therapeutic communication  - Assess patient's level of functioning, behavior and potential for risk  - Engage patient in 1 on 1 interactions for a minimum of 15 minutes each session  - Encourage patient to express fears, feelings, frustrations, and discuss symptoms    - [place] patient to reality, help patient recognize reality-based thinking   - Administer medications as ordered and assess for potential side effects  - Provide the patient education related to the signs and symptoms of the illness and desired effects of prescribed medications  Outcome: Progressing  Goal: Agree to be compliant with medication regime, as prescribed and report medication side effects  Description  Interventions:  - Offer appropriate PRN medication and supervise ingestion; conduct aims, as needed   Outcome: Progressing  Goal: Attend and participate in unit activities, including therapeutic, recreational, and educational groups  Description  Interventions:  - Provide therapeutic and educational activities daily, encourage attendance and participation, and document same in the medical record     CERTIFIED PEER SPECIALIST INTERVENTIONS:    Complete peer assessment with patient to assess their needs and identify their goals to complete while in the recovery program as well as once discharged into the community  Patient will complete WRAP Plan, Crisis Plan and 5 Life Domains  Patient will attend 50% of groups offered on the unit  Patient will complete a goal card weekly      Outcome: Progressing  Goal: Recognize dysfunctional thoughts, communicate reality-based thoughts at the time of discharge  Description  Interventions:  - Provide medication and psycho-education to assist patient in compliance and developing insight into his/her illness   Outcome: Progressing     Problem: Ineffective Coping  Goal: Participates in unit activities  Description  Interventions:  - Provide therapeutic environment   - Provide required programming   - Redirect inappropriate behaviors   Outcome: Progressing  Goal: Patient/Family verbalizes awareness of resources  Outcome: Progressing  Goal: Understands least restrictive measures  Description  Interventions:  - Utilize least restrictive behavior  Outcome: Progressing     Problem: Risk for Self Injury/Neglect  Goal: Treatment Goal: Remain safe during length of stay, learn and adopt new coping skills, and be free of self-injurious ideation, impulses and acts at the time of discharge  Outcome: Progressing  Goal: Verbalize thoughts and feelings  Description  Interventions:  - Assess and re-assess patient's lethality and potential for self-injury  - Engage patient in 1:1 interactions, daily, for a minimum of 15 minutes  - Encourage patient to express feelings, fears, frustrations, hopes  - Establish rapport/trust with patient   Outcome: Progressing  Goal: Refrain from harming self  Description  Interventions:  - Monitor patient closely, per order  - Develop a trusting relationship  - Supervise medication ingestion, monitor effects and side effects   Outcome: Progressing  Goal: Attend and participate in unit activities, including therapeutic, recreational, and educational groups  Description  Interventions:  - Provide therapeutic and educational activities daily, encourage attendance and participation, and document same in the medical record  - Obtain collateral information, encourage visitation and family involvement in care   Outcome: Progressing     Problem: Depression  Goal: Treatment Goal: Demonstrate behavioral control of depressive symptoms, verbalize feelings of improved mood/affect, and adopt new coping skills prior to discharge  Outcome: Progressing  Goal: Verbalize thoughts and feelings  Description  Interventions:  - Assess and re-assess patient's level of risk   - Engage patient in 1:1 interactions, daily, for a minimum of 15 minutes   - Encourage patient to express feelings, fears, frustrations, hopes   Outcome: Progressing  Goal: Refrain from isolation  Description  Interventions:  - Develop a trusting relationship   - Encourage socialization   Outcome: Progressing     Problem: Anxiety  Goal: Anxiety is at manageable level  Description  Interventions:  - Assess and monitor patient's anxiety level  - Monitor for signs and symptoms of anxiety both physical and emotional (heart palpitations, chest pain, shortness of breath, headaches, nausea, feeling jumpy, restlessness, irritable, apprehensive)  - Collaborate with interdisciplinary team and initiate plan and interventions as ordered    - [place] patient to unit/surroundings  - Explain treatment plan  - Encourage participation in care  - Encourage verbalization of concerns/fears  - Identify coping mechanisms  - Assist in developing anxiety-reducing skills  - Administer/offer alternative therapies  - Limit or eliminate stimulants  Outcome: Progressing     Problem: Alteration in Orientation  Goal: Interact with staff daily  Description  Interventions:  - Assess and re-assess patient's level of orientation  - Engage patient in 1 on 1 interactions, daily, for a minimum of 15 minutes   - Establish rapport/trust with patient   Outcome: Progressing     Problem: PAIN - ADULT  Goal: Verbalizes/displays adequate comfort level or baseline comfort level  Description  Interventions:  - Encourage patient to monitor pain and request assistance  - Assess pain using appropriate pain scale  - Administer analgesics based on type and severity of pain and evaluate response  - Implement non-pharmacological measures as appropriate and evaluate response  - Consider cultural and social influences on pain and pain management  - Notify physician/advanced practitioner if interventions unsuccessful or patient reports new pain  Outcome: Progressing     Problem: SAFETY ADULT  Goal: Patient will remain free of falls  Description  INTERVENTIONS:  - Assess patient frequently for physical needs  -  Identify cognitive and physical deficits and behaviors that affect risk of falls  -  [place] fall precautions as indicated by assessment   - Educate patient/family on patient safety including physical limitations  - Instruct patient to call for assistance with activity based on assessment  - Modify environment to reduce risk of injury  - Consider OT/PT consult to assist with strengthening/mobility  Outcome: Progressing    Problem: Alteration in Thoughts and Perception  Goal: Complete daily ADLs, including personal hygiene independently, as able  Description  Interventions:  - Observe, teach, and assist patient with ADLS  - Monitor and promote a balance of rest/activity, with adequate nutrition and elimination   Outcome: Not Progressing     Problem: Risk for Self Injury/Neglect  Goal: Complete daily ADLs, including personal hygiene independently, as able  Description  Interventions:  - Observe, teach, and assist patient with ADLS  - Monitor and promote a balance of rest/activity, with adequate nutrition and elimination  Outcome: Not Progressing     Problem: Nutrition/Hydration-ADULT  Goal: Nutrient/Hydration intake appropriate for improving, restoring or maintaining nutritional needs  Description  Monitor and assess patient's nutrition/hydration status for malnutrition  Collaborate with interdisciplinary team and initiate plan and interventions as ordered  Monitor patient's weight and dietary intake as ordered or per policy  Utilize nutrition screening tool and intervene as necessary   Determine patient's food preferences and provide high-protein, high-caloric foods as appropriate  INTERVENTIONS:  - Monitor oral intake, urinary output, labs, and treatment plans  - Assess nutrition and hydration status and recommend course of action  - Evaluate amount of meals eaten  - Assist patient with eating if necessary   - Allow adequate time for meals  - Recommend/ encourage appropriate diets, oral nutritional supplements, and vitamin/mineral supplements  - Order, calculate, and assess calorie counts as needed  - Recommend, monitor, and adjust tube feedings and TPN/PPN based on assessed needs  - Assess need for intravenous fluids  - Provide specific nutrition/hydration education as appropriate  - Include patient/family/caregiver in decisions related to nutrition  Outcome: Not Progressing    Mostly isolative to her bed, coming out of her room for meds and meals, and attended IMR group  Minimally interacted with others  Did not shower or do hygiene and looks disheveled  Ate 100% of breakfast  Came out for lunch looked at her tray, then returned to her bed without eating  No other behaviors or issues noted  No somatic complaints voiced  Continue to monitor

## 2020-02-27 NOTE — TREATMENT TEAM
02/26/20 1400   Activity/Group Checklist   Group   (Recovery Anonymous )   Attendance Did not attend   Attendance Duration (min) 46-60   Affect/Mood IRMA

## 2020-02-27 NOTE — SOCIAL WORK
Social Work Intern:    Intern met with the patient for an individual therapy session  The session focused on treatment goals and off ground passes with staff  The patient stated that she was a bit tired, but willing to participate in the discussion  Patient expressed that she has been doing well with taking her medications  Patient expressed being excited to go out with staff  Patient explained that she had missed her last outing due to not washing her face or brushing her teeth the morning of the outing  Patient expressed that this was not done on purpose, and that she did shower the previous night in order to go out on the pass  The patient expressed that she was not feeling anxious about the possibility of trying to go to the park for another outing  The patient explained that she often feels anxious the day of the outing or during the outing itself  Patient identified going to groups and showering as her coping skills  Patient was encouraged to practice deep breathing and positive self-talk during the outings in order to remain calm  Patient was asked if she received additional support from family members or friends  Patient stated that her sister has been  her number one supporter through her recovery process  Patient stated that her sister comes to visit her every week which makes her happy  Patient denied having any additional concerns during the session  Patient did well with participation and self-disclosing during the session  The intern will meet with the patient for a follow-up session next week

## 2020-02-27 NOTE — PROGRESS NOTES
2[address] Danie Steiner used the noodls & did exceptionally well tonight; consistently reaching volumes of 1250ml or slightly higher  Came up for HS medicine except the Singulair  Had an HS snack of milk, cookies, yogurt  Wearing now her QHS humidified nasal O2 @ 1L for bed

## 2020-02-27 NOTE — PLAN OF CARE
Problem: Alteration in Thoughts and Perception  Goal: Verbalize thoughts and feelings  Description  Interventions:  - Promote a nonjudgmental and trusting relationship with the patient through active listening and therapeutic communication  - Assess patient's level of functioning, behavior and potential for risk  - Engage patient in 1 on 1 interactions for a minimum of 15 minutes each session  - Encourage patient to express fears, feelings, frustrations, and discuss symptoms    - [place] patient to reality, help patient recognize reality-based thinking   - Administer medications as ordered and assess for potential side effects  - Provide the patient education related to the signs and symptoms of the illness and desired effects of prescribed medications  Outcome: Progressing  Goal: Agree to be compliant with medication regime, as prescribed and report medication side effects  Description  Interventions:  - Offer appropriate PRN medication and supervise ingestion; conduct aims, as needed   Outcome: Progressing     Problem: Ineffective Coping  Goal: Participates in unit activities  Description  Interventions:  - Provide therapeutic environment   - Provide required programming   - Redirect inappropriate behaviors   Outcome: Progressing     Problem: Alteration in Orientation  Goal: Interact with staff daily  Description  Interventions:  - Assess and re-assess patient's level of orientation  - Engage patient in 1 on 1 interactions, daily, for a minimum of 15 minutes   - Establish rapport/trust with patient   Outcome: Progressing     Problem: Alteration in Thoughts and Perception  Goal: Complete daily ADLs, including personal hygiene independently, as able  Description  Interventions:  - Observe, teach, and assist patient with ADLS  - Monitor and promote a balance of rest/activity, with adequate nutrition and elimination   Outcome: Not Progressing     Agustin Edgar was in her room at the beginning of the shift   She then came out and sat by the phone  Social with staff and select peers  Had to be prompted to come for medication  Took all pills without difficulty  Only drank Ensure for supper  Did not want meal tray  Social with peers while in the dining room  No shower or BM this shift  Disheveled appearance  Spoke with male peer about family  Napped at times  Attended Women's group and evening group  Prompted for HS medication  Ate snack  Oxygen saturation 92% prior to oxygen 1 liter via nasal cannula applied  Continue to monitor  Precautions maintained

## 2020-02-27 NOTE — ASSESSMENT & PLAN NOTE
Patient states that he she will take a shower today as a last 1 was 3 days ago  She is anticipating to meet with the  from the [facility] home  She is still somewhat disheveled poorly groomed but friendly and pleasant upon approach  She is skeptical about some people wishing her to choke and continues to have psychosomatic symptoms about not being able to swallow or breathe despite swallowing and breathing fine  He she is still preoccupied suspicious of some of the staff tampering with her oxygen concentrator but compliant with medications so far  She is eating fairly well and eating well and has not lost much weight despite complaining about poor eating habits    Is still looking forward to getting  a pass with staff and knows the expectations before she can actually go out on a pass once approved    Current medications:    Current Facility-Administered Medications:     acetaminophen (TYLENOL) tablet 325 mg, 325 mg, Oral, Q6H PRN, Jennifer Taveras MD    acetaminophen (TYLENOL) tablet 650 mg, 650 mg, Oral, Q6H PRN, Jennifer Taveras MD    acetaminophen (TYLENOL) tablet 650 mg, 650 mg, Oral, Q8H PRN, Jennifer Taveras MD    albuterol (PROVENTIL HFA,VENTOLIN HFA) inhaler 2 puff, 2 puff, Inhalation, Q4H PRN, Jennifer Taveras MD, 2 puff at 10/11/19 0424    aluminum-magnesium hydroxide-simethicone (MYLANTA) 200-200-20 mg/5 mL oral suspension 15 mL, 15 mL, Oral, Q4H PRN, Jennifer Taveras MD, 15 mL at 01/26/20 1021    ammonium lactate (LAC-HYDRIN) 12 % lotion 1 application, 1 application, Topical, BID PRN, Jennifer Taveras MD    benzonatate (TESSALON PERLES) capsule 100 mg, 100 mg, Oral, TID PRN, Jennifer Taveras MD    benztropine (COGENTIN) injection 1 mg, 1 mg, Intramuscular, Q8H PRN, Jennifer Taveras MD    carbamide peroxide (DEBROX) 6 5 % otic solution 5 drop, 5 drop, Left Ear, BID PRN, Sabina Lerner MD    cloZAPine (CLOZARIL) tablet 25 mg, 25 mg, Oral, BID, Jennifer Taveras MD, 25 mg at 02/26/20 2101    cloZAPine (CLOZARIL) tablet 50 mg, 50 mg, Oral, BID, Roberto Shankar MD, 50 mg at 02/26/20 2101    docusate sodium (COLACE) capsule 100 mg, 100 mg, Oral, BID PRN, Roberto Shankar MD    EPINEPHrine PF (ADRENALIN) 1 mg/mL injection 0 15 mg, 0 15 mg, Intramuscular, Once PRN, Roberto Shankar MD    fluticasone-vilanterol (BREO ELLIPTA) 200-25 MCG/INH inhaler 1 puff, 1 puff, Inhalation, Daily, Roberto Shankar MD, 1 puff at 02/27/20 0838    ketotifen (ZADITOR) 0 025 % ophthalmic solution 1 drop, 1 drop, Right Eye, BID PRN, Roberto Shankar MD    levothyroxine tablet 125 mcg, 125 mcg, Oral, Early Morning, Roberto Shankar MD, 125 mcg at 02/27/20 0700    magnesium hydroxide (MILK OF MAGNESIA) 400 mg/5 mL oral suspension 30 mL, 30 mL, Oral, Daily PRN, Roberto Shankar MD    montelukast (SINGULAIR) tablet 10 mg, 10 mg, Oral, HS, Roberto Shankar MD, 10 mg at 02/22/20 2104    OLANZapine (ZyPREXA) IM injection 5 mg, 5 mg, Intramuscular, Q8H PRN, Roberto Shankar MD    OLANZapine (ZyPREXA) tablet 5 mg, 5 mg, Oral, Q8H PRN, Roberto Shankar MD    ondansetron (ZOFRAN-ODT) dispersible tablet 4 mg, 4 mg, Oral, Q6H PRN, Roberto Shankar MD, 4 mg at 12/09/19 1757    pantoprazole (PROTONIX) EC tablet 40 mg, 40 mg, Oral, Early Morning, Roberto Shankar MD, 40 mg at 02/27/20 0700    polyethylene glycol (MIRALAX) packet 17 g, 17 g, Oral, Daily PRN, Roberto Shankar MD    polyvinyl alcohol (LIQUIFILM TEARS) 1 4 % ophthalmic solution 1 drop, 1 drop, Both Eyes, Q3H PRN, Roberto Shankar MD    sertraline (ZOLOFT) tablet 175 mg, 175 mg, Oral, Daily, Roberto Shankar MD, 175 mg at 02/27/20 1214    sucralfate (CARAFATE) oral suspension 1,000 mg, 1,000 mg, Oral, BID, Cat Torres MD, 1,000 mg at 02/27/20 5991    theophylline (JEF-24) 24 hr capsule 200 mg, 200 mg, Oral, Daily, Roberto Shankar MD, 200 mg at 02/27/20 0838    tiotropium [facility]) capsule for inhaler 18 mcg, 18 mcg, Inhalation, Daily, Roberto Shankar MD, 18 mcg at 02/27/20 0838    traZODone (DESYREL) tablet 25 mg, 25 mg, Oral, HS PRN, Krystyna Mcclain MD  Justification if on more than two antipsychotics: not applicable  Side effects if any: none    Risks , benefits, side effects and precautions of medications discussed with patient and reminded patient to let us know any problems with medications     Objective:     Vital Signs:  Vitals:    02/26/20 0732 02/26/20 1558 02/26/20 1930 02/27/20 0730   BP: 90/50 96/62 92/56 100/62   BP Location: Left arm Left arm Left arm Right arm   Pulse: 64 89 83 70   Resp:  14 14 18   Temp:  (!) 97 4 °F (36 3 °C) (!) 97 1 °F (36 2 °C) 97 9 °F (36 6 °C)   TempSrc:  Temporal Temporal    SpO2:  93% 93% 92%   Weight:       Height:         Appearance:  age appropriate and older than stated age somewhat disheveled poorly groomed preoccupied with good eye contact   Behavior:  deviant, evasive and guarded becoming suspicious and argumentative   Speech:  delayed, increased latency of response and tangential    Mood:  anxious, euphoric and irritable    Affect:  increased in intensity, increased in range, mood-congruent and redirectable   Thought Process:  circumstantial, concrete, goal directed, illogical and perserverative with tendency to become stilted   Thought Content:  delusions  grandiose and persecutory no current suicidal homicidal thoughts intent or plans  No preoccupation with violence or suicide  Still expresses suspiciousness about staff tampering with her oxygen concentrator  Still with psychosomatic symptoms are not being able to breathe or swallow or dying of talking  No phobias obsessions or compulsions    no overt delusions elicited except for some paranoid     Perceptual Disturbances: None    Risk Potential: Inability to care for herself and refusal to take showers regularly   Sensorium:  person, place, time/date, situation, day of week, month of year, year and time   Cognition:  grossly intact no language deficit, no deficit in recent or remote memory, aware of current events   Consciousness:  alert and awake    Attention: Fair   Intellect: Average   Insight:  Limited   Judgment: fair       Motor Activity: no abnormal movements         Recent Labs:  Results Reviewed     None          I/O Past 24 hours:  No intake/output data recorded  No intake/output data recorded  Assessment / Plan:     Schizoaffective disorder, bipolar type ([address] )  Overall status:  improving    Reason for continued inpatient care: to assess ability to maintain improvement by attending to ADLs and taking showers regular and see how she does on outing with family after another outing with staff escort next week  Acceptance by patient: accepting now  Hopefulness in recovery: living at the [facility]  Understanding of medications :  yes  Involved in reintegration process: attending groups and has off grounds and off unit privileges   Trusting in relatoinship with psychiatrist:  Nohemy Sandoval now  Overall status :  No changes  Recommended Treatment:    Medication changes:  1) none today    Non-pharmacological treatments  1) Continue with individual therapy, group therapy, milieu therapy and occupational therapy using recovery principles and psycho-education about accepting illness and need for treatment   2) encourage to take showers twice a week and cooperate with treatment and adl skills as per her behav  plan  3) another pass with staff to community today again before pass with sister to see if she would fully comply with assigned outing before pass to community with sister  4) Prepare for interview for intake with [place] [facility]   Safety  1) Safety/communication plan established targeting dynamic risk factors above  Discharge Plan to a [facility] at [address] / Coordination of Care    Total floor / unit time spent today 15 minutes  Greater than 50% of total time was spent with the patient and / or family counseling and / or coordination of care    Receptive to listening and learning coping skills for management of symptoms and attending to ADLs  Patient's Rights, confidentiality and exceptions to confidentiality, use of automated medical record, [facility] staff access to medical record, and consent to treatment reviewed      Jeet Levine MD

## 2020-02-27 NOTE — PROGRESS NOTES
Progress Note - Madison Fast 1955, 60 y o  female MRN: [id number]    Unit/Bed#: [facility] 110-02 Encounter: [id number]    Primary Care Provider: Poli Nguyen PA-C   Date and time admitted to hospital: 7/23/2019  5:30 PM        * Schizoaffective disorder, bipolar type [facility])  Assessment & Plan  Patient states that he she will take a shower today as a last 1 was 3 days ago  She is anticipating to meet with the  from the [facility] [place] [place]  She is still somewhat disheveled poorly groomed but friendly and pleasant upon approach  She is skeptical about some people wishing her to choke and continues to have psychosomatic symptoms about not being able to swallow or breathe despite swallowing and breathing fine  He she is still preoccupied suspicious of some of the staff tampering with her oxygen concentrator but compliant with medications so far  She is eating fairly well and eating well and has not lost much weight despite complaining about poor eating habits    Is still looking forward to getting  a pass with staff and knows the expectations before she can actually go out on a pass once approved    Current medications:    Current Facility-Administered Medications:     acetaminophen (TYLENOL) tablet 325 mg, 325 mg, Oral, Q6H PRN, Alirio Frazier MD    acetaminophen (TYLENOL) tablet 650 mg, 650 mg, Oral, Q6H PRN, Alirio Frazier MD    acetaminophen (TYLENOL) tablet 650 mg, 650 mg, Oral, Q8H PRN, Alirio Frazier MD    albuterol (PROVENTIL HFA,VENTOLIN HFA) inhaler 2 puff, 2 puff, Inhalation, Q4H PRN, Alirio Frazier MD, 2 puff at 10/11/19 0424    aluminum-magnesium hydroxide-simethicone (MYLANTA) 200-200-20 mg/5 mL oral suspension 15 mL, 15 mL, Oral, Q4H PRN, Alirio Frazier MD, 15 mL at 01/26/20 1021    ammonium lactate (LAC-HYDRIN) 12 % lotion 1 application, 1 application, Topical, BID PRN, Alirio Frazier MD    benzonatate (TESSALON PERLES) capsule 100 mg, 100 mg, Oral, TID PRN, MD Navin Le benztropine (COGENTIN) injection 1 mg, 1 mg, Intramuscular, Q8H PRN, Cole Prater MD    carbamide peroxide (DEBROX) 6 5 % otic solution 5 drop, 5 drop, Left Ear, BID PRN, Sage Gavin MD    cloZAPine (CLOZARIL) tablet 25 mg, 25 mg, Oral, BID, Cole Prater MD, 25 mg at 02/26/20 2101    cloZAPine (CLOZARIL) tablet 50 mg, 50 mg, Oral, BID, Cole Prater MD, 50 mg at 02/26/20 2101    docusate sodium (COLACE) capsule 100 mg, 100 mg, Oral, BID PRN, Cole Prater MD    EPINEPHrine PF (ADRENALIN) 1 mg/mL injection 0 15 mg, 0 15 mg, Intramuscular, Once PRN, Cole Prater MD    fluticasone-vilanterol (BREO ELLIPTA) 200-25 MCG/INH inhaler 1 puff, 1 puff, Inhalation, Daily, Cole Prater MD, 1 puff at 02/27/20 0838    ketotifen (ZADITOR) 0 025 % ophthalmic solution 1 drop, 1 drop, Right Eye, BID PRN, Cole Prater MD    levothyroxine tablet 125 mcg, 125 mcg, Oral, Early Morning, Cole Prater MD, 125 mcg at 02/27/20 0700    magnesium hydroxide (MILK OF MAGNESIA) 400 mg/5 mL oral suspension 30 mL, 30 mL, Oral, Daily PRN, Cole Prater MD    montelukast (SINGULAIR) tablet 10 mg, 10 mg, Oral, HS, Cole Prater MD, 10 mg at 02/22/20 2104    OLANZapine (ZyPREXA) IM injection 5 mg, 5 mg, Intramuscular, Q8H PRN, Cole Prater MD    OLANZapine (ZyPREXA) tablet 5 mg, 5 mg, Oral, Q8H PRN, Cole Prater MD    ondansetron (ZOFRAN-ODT) dispersible tablet 4 mg, 4 mg, Oral, Q6H PRN, Cole Prater MD, 4 mg at 12/09/19 1757    pantoprazole (PROTONIX) EC tablet 40 mg, 40 mg, Oral, Early Morning, Cole Prater MD, 40 mg at 02/27/20 0700    polyethylene glycol (MIRALAX) packet 17 g, 17 g, Oral, Daily PRN, Cole Prater MD    polyvinyl alcohol (LIQUIFILM TEARS) 1 4 % ophthalmic solution 1 drop, 1 drop, Both Eyes, Q3H PRN, Cole Prater MD    sertraline (ZOLOFT) tablet 175 mg, 175 mg, Oral, Daily, Cole Prater MD, 175 mg at 02/27/20 9196    sucralfate (CARAFATE) oral suspension 1,000 mg, 1,000 mg, Oral, BID, Cat Torres MD, 1,000 mg at 02/27/20 0714    theophylline (JEF-24) 24 hr capsule 200 mg, 200 mg, Oral, Daily, Jerome Lopez MD, 200 mg at 02/27/20 3427    tiotropium [facility]) capsule for inhaler 18 mcg, 18 mcg, Inhalation, Daily, Jerome Lopez MD, 18 mcg at 02/27/20 6833    traZODone (DESYREL) tablet 25 mg, 25 mg, Oral, HS PRN, Jerome Lopez MD  Justification if on more than two antipsychotics: not applicable  Side effects if any: none    Risks , benefits, side effects and precautions of medications discussed with patient and reminded patient to let us know any problems with medications     Objective:     Vital Signs:  Vitals:    02/26/20 0732 02/26/20 1558 02/26/20 1930 02/27/20 0730   BP: 90/50 96/62 92/56 100/62   BP Location: Left arm Left arm Left arm Right arm   Pulse: 64 89 83 70   Resp:  14 14 18   Temp:  (!) 97 4 °F (36 3 °C) (!) 97 1 °F (36 2 °C) 97 9 °F (36 6 °C)   TempSrc:  Temporal Temporal    SpO2:  93% 93% 92%   Weight:       Height:         Appearance:  age appropriate and older than stated age somewhat disheveled poorly groomed preoccupied with good eye contact   Behavior:  deviant, evasive and guarded becoming suspicious and argumentative   Speech:  delayed, increased latency of response and tangential    Mood:  anxious, euphoric and irritable    Affect:  increased in intensity, increased in range, mood-congruent and redirectable   Thought Process:  circumstantial, concrete, goal directed, illogical and perserverative with tendency to become stilted   Thought Content:  delusions  grandiose and persecutory no current suicidal homicidal thoughts intent or plans  No preoccupation with violence or suicide  Still expresses suspiciousness about staff tampering with her oxygen concentrator  Still with psychosomatic symptoms are not being able to breathe or swallow or dying of talking  No phobias obsessions or compulsions    no overt delusions elicited except for some paranoid     Perceptual Disturbances: None    Risk Potential: Inability to care for herself and refusal to take showers regularly   Sensorium:  person, place, time/date, situation, day of week, month of year, year and time   Cognition:  grossly intact no language deficit, no deficit in recent or remote memory, aware of current events   Consciousness:  alert and awake    Attention: Fair   Intellect: Average   Insight:  Limited   Judgment: fair       Motor Activity: no abnormal movements         Recent Labs:  Results Reviewed     None          I/O Past 24 hours:  No intake/output data recorded  No intake/output data recorded  Assessment / Plan:     Schizoaffective disorder, bipolar type ([facility] [address] )  Overall status:  improving    Reason for continued inpatient care: to assess ability to maintain improvement by attending to ADLs and taking showers regular and see how she does on outing with family after another outing with staff escort next week  Acceptance by patient: accepting now  Hopefulness in recovery: living at the [facility]  Understanding of medications :  yes  Involved in reintegration process: attending groups and has off grounds and off unit privileges   Trusting in relatoinship with psychiatrist:  Sam Smallwood now  Overall status :  No changes  Recommended Treatment:    Medication changes:  1) none today    Non-pharmacological treatments  1) Continue with individual therapy, group therapy, milieu therapy and occupational therapy using recovery principles and psycho-education about accepting illness and need for treatment   2) encourage to take showers twice a week and cooperate with treatment and adl skills as per her behav  plan  3) another pass with staff to community today again before pass with sister to see if she would fully comply with assigned outing before pass to community with sister  4) Prepare for interview for intake with [place] [facility]   Safety  1) Safety/communication plan established targeting dynamic risk factors above    Discharge Plan to a [facility] [place]    Counseling / Coordination of Care    Total floor / unit time spent today 15 minutes  Greater than 50% of total time was spent with the patient and / or family counseling and / or coordination of care  Receptive to listening and learning coping skills for management of symptoms and attending to ADLs  Patient's Rights, confidentiality and exceptions to confidentiality, use of automated medical record, [facility] staff access to medical record, and consent to treatment reviewed      Zander Yanez MD

## 2020-02-27 NOTE — TREATMENT TEAM
02/27/20 1100   Activity/Group Checklist   Group   ([facility]/Community Resources )   Attendance Attended   Attendance Duration (min) 46-60   Interactions Interacted appropriately   Affect/Mood Appropriate;Normal range;Calm   Goals Achieved Identified feelings; Able to listen to others; Able to engage in interactions; Able to self-disclose

## 2020-02-27 NOTE — PLAN OF CARE
Problem: Alteration in Thoughts and Perception  Goal: Verbalize thoughts and feelings  Description  Interventions:  - Promote a nonjudgmental and trusting relationship with the patient through active listening and therapeutic communication  - Assess patient's level of functioning, behavior and potential for risk  - Engage patient in 1 on 1 interactions for a minimum of 15 minutes each session  - Encourage patient to express fears, feelings, frustrations, and discuss symptoms    - [place] patient to reality, help patient recognize reality-based thinking   - Administer medications as ordered and assess for potential side effects  - Provide the patient education related to the signs and symptoms of the illness and desired effects of prescribed medications  Outcome: Progressing  Goal: Agree to be compliant with medication regime, as prescribed and report medication side effects  Description  Interventions:  - Offer appropriate PRN medication and supervise ingestion; conduct aims, as needed   Outcome: Progressing  Goal: Attend and participate in unit activities, including therapeutic, recreational, and educational groups  Description  Interventions:  - Provide therapeutic and educational activities daily, encourage attendance and participation, and document same in the medical record     CERTIFIED PEER SPECIALIST INTERVENTIONS:    Complete peer assessment with patient to assess their needs and identify their goals to complete while in the recovery program as well as once discharged into the community  Patient will complete WRAP Plan, Crisis Plan and [facility] [employer]  Patient will attend 50% of groups offered on the unit  Patient will complete a goal card weekly      Outcome: Progressing     Problem: Alteration in Thoughts and Perception  Goal: Recognize dysfunctional thoughts, communicate reality-based thoughts at the time of discharge  Description  Interventions:  - Provide medication and psycho-education to assist patient in compliance and developing insight into his/her illness   Outcome: Not Progressing     Problem: Nutrition/Hydration-ADULT  Goal: Nutrient/Hydration intake appropriate for improving, restoring or maintaining nutritional needs  Description  Monitor and assess patient's nutrition/hydration status for malnutrition  Collaborate with interdisciplinary team and initiate plan and interventions as ordered  Monitor patient's weight and dietary intake as ordered or per policy  Utilize nutrition screening tool and intervene as necessary  Determine patient's food preferences and provide high-protein, high-caloric foods as appropriate  INTERVENTIONS:  - Monitor oral intake, urinary output, labs, and treatment plans  - Assess nutrition and hydration status and recommend course of action  - Evaluate amount of meals eaten  - Assist patient with eating if necessary   - Allow adequate time for meals  - Recommend/ encourage appropriate diets, oral nutritional supplements, and vitamin/mineral supplements  - Order, calculate, and assess calorie counts as needed  - Recommend, monitor, and adjust tube feedings and TPN/PPN based on assessed needs  - Assess need for intravenous fluids  - Provide specific nutrition/hydration education as appropriate  - Include patient/family/caregiver in decisions related to nutrition  Outcome: Not Progressing     2005 Alease Severs ate poorly @ meal; 25% (some egg salad) & her Ensure  Explains, "A lot of negativity in the room  Some one in the room was wishing that I would choke " She agreed that unless one believes someone else can have that kind of power over them, there is no legitimacy in this claim, & is better off believing is immune to such mean spirited thoughts  (She speaks in terms of Rosemary, God & presence of Evil, but, agrees strong Rosemary will sustain her in face of Evil ) She did come up for her supper medicine   Did accept credit that followed through w/getting OOB today & reinvolving herself in programming as agreed upon late last evening  Taking part now in PM Group  Is otherwise isolative to room

## 2020-02-28 NOTE — PROGRESS NOTES
02/28/20 0900   Team Meeting   Meeting Type Daily Rounds   Team Members Present   Team Members Present Physician;Nurse;; Other (Discipline and Name)   Physician Team Member Dr Gladys Ag Team Member Laure Avilez RN   Care Management Team Member Brenda Donis   Other (Discipline and Name) Janee Yanes LCSW     Patient went with staff from [facility] which went well  Attended groups  Slept

## 2020-02-28 NOTE — PLAN OF CARE
Problem: Alteration in Thoughts and Perception  Goal: Treatment Goal: Gain control of psychotic behaviors/thinking, reduce/eliminate presenting symptoms and demonstrate improved reality functioning upon discharge  Outcome: Progressing  Goal: Verbalize thoughts and feelings  Description  Interventions:  - Promote a nonjudgmental and trusting relationship with the patient through active listening and therapeutic communication  - Assess patient's level of functioning, behavior and potential for risk  - Engage patient in 1 on 1 interactions for a minimum of 15 minutes each session  - Encourage patient to express fears, feelings, frustrations, and discuss symptoms    - [place] patient to reality, help patient recognize reality-based thinking   - Administer medications as ordered and assess for potential side effects  - Provide the patient education related to the signs and symptoms of the illness and desired effects of prescribed medications  Outcome: Progressing  Goal: Agree to be compliant with medication regime, as prescribed and report medication side effects  Description  Interventions:  - Offer appropriate PRN medication and supervise ingestion; conduct aims, as needed   Outcome: Progressing  Goal: Attend and participate in unit activities, including therapeutic, recreational, and educational groups  Description  Interventions:  - Provide therapeutic and educational activities daily, encourage attendance and participation, and document same in the medical record     CERTIFIED PEER SPECIALIST INTERVENTIONS:    Complete peer assessment with patient to assess their needs and identify their goals to complete while in the recovery program as well as once discharged into the community  Patient will complete WRAP Plan, Crisis Plan and 5 Life Domains  Patient will attend 50% of groups offered on the unit  Patient will complete a goal card weekly      Outcome: Progressing  Goal: Recognize dysfunctional thoughts, communicate reality-based thoughts at the time of discharge  Description  Interventions:  - Provide medication and psycho-education to assist patient in compliance and developing insight into his/her illness   Outcome: Progressing     Problem: Ineffective Coping  Goal: Participates in unit activities  Description  Interventions:  - Provide therapeutic environment   - Provide required programming   - Redirect inappropriate behaviors   Outcome: Progressing  Goal: Patient/Family verbalizes awareness of resources  Outcome: Progressing  Goal: Understands least restrictive measures  Description  Interventions:  - Utilize least restrictive behavior  Outcome: Progressing     Problem: Risk for Self Injury/Neglect  Goal: Treatment Goal: Remain safe during length of stay, learn and adopt new coping skills, and be free of self-injurious ideation, impulses and acts at the time of discharge  Outcome: Progressing  Goal: Verbalize thoughts and feelings  Description  Interventions:  - Assess and re-assess patient's lethality and potential for self-injury  - Engage patient in 1:1 interactions, daily, for a minimum of 15 minutes  - Encourage patient to express feelings, fears, frustrations, hopes  - Establish rapport/trust with patient   Outcome: Progressing  Goal: Refrain from harming self  Description  Interventions:  - Monitor patient closely, per order  - Develop a trusting relationship  - Supervise medication ingestion, monitor effects and side effects   Outcome: Progressing  Goal: Attend and participate in unit activities, including therapeutic, recreational, and educational groups  Description  Interventions:  - Provide therapeutic and educational activities daily, encourage attendance and participation, and document same in the medical record  - Obtain collateral information, encourage visitation and family involvement in care   Outcome: Progressing     Problem: Depression  Goal: Treatment Goal: Demonstrate behavioral control of depressive symptoms, verbalize feelings of improved mood/affect, and adopt new coping skills prior to discharge  Outcome: Progressing  Goal: Verbalize thoughts and feelings  Description  Interventions:  - Assess and re-assess patient's level of risk   - Engage patient in 1:1 interactions, daily, for a minimum of 15 minutes   - Encourage patient to express feelings, fears, frustrations, hopes   Outcome: Progressing  Goal: Refrain from isolation  Description  Interventions:  - Develop a trusting relationship   - Encourage socialization   Outcome: Progressing  Goal: Refrain from self-neglect  Outcome: Progressing     Problem: Anxiety  Goal: Anxiety is at manageable level  Description  Interventions:  - Assess and monitor patient's anxiety level  - Monitor for signs and symptoms of anxiety both physical and emotional (heart palpitations, chest pain, shortness of breath, headaches, nausea, feeling jumpy, restlessness, irritable, apprehensive)  - Collaborate with interdisciplinary team and initiate plan and interventions as ordered    - [place] patient to unit/surroundings  - Explain treatment plan  - Encourage participation in care  - Encourage verbalization of concerns/fears  - Identify coping mechanisms  - Assist in developing anxiety-reducing skills  - Administer/offer alternative therapies  - Limit or eliminate stimulants  Outcome: Progressing     Problem: Alteration in Orientation  Goal: Interact with staff daily  Description  Interventions:  - Assess and re-assess patient's level of orientation  - Engage patient in 1 on 1 interactions, daily, for a minimum of 15 minutes   - Establish rapport/trust with patient   Outcome: Progressing  Goal: Cooperate with recommended testing/procedures  Description  Interventions:  - Determine need for ancillary testing  - Observe for mental status changes  - Implement falls/precaution protocol   Outcome: Progressing     Problem: PAIN - ADULT  Goal: Verbalizes/displays adequate comfort level or baseline comfort level  Description  Interventions:  - Encourage patient to monitor pain and request assistance  - Assess pain using appropriate pain scale  - Administer analgesics based on type and severity of pain and evaluate response  - Implement non-pharmacological measures as appropriate and evaluate response  - Consider cultural and social influences on pain and pain management  - Notify physician/advanced practitioner if interventions unsuccessful or patient reports new pain  Outcome: Progressing     Problem: SAFETY ADULT  Goal: Patient will remain free of falls  Description  INTERVENTIONS:  - Assess patient frequently for physical needs  -  Identify cognitive and physical deficits and behaviors that affect risk of falls    -  [place] fall precautions as indicated by assessment   - Educate patient/family on patient safety including physical limitations  - Instruct patient to call for assistance with activity based on assessment  - Modify environment to reduce risk of injury  - Consider OT/PT consult to assist with strengthening/mobility  Outcome: Progressing     Problem: Alteration in Thoughts and Perception  Goal: Complete daily ADLs, including personal hygiene independently, as able  Description  Interventions:  - Observe, teach, and assist patient with ADLS  - Monitor and promote a balance of rest/activity, with adequate nutrition and elimination   Outcome: Not Progressing     Problem: Risk for Self Injury/Neglect  Goal: Complete daily ADLs, including personal hygiene independently, as able  Description  Interventions:  - Observe, teach, and assist patient with ADLS  - Monitor and promote a balance of rest/activity, with adequate nutrition and elimination  Outcome: Not Progressing     Problem: Nutrition/Hydration-ADULT  Goal: Nutrient/Hydration intake appropriate for improving, restoring or maintaining nutritional needs  Description  Monitor and assess patient's nutrition/hydration status for malnutrition  Collaborate with interdisciplinary team and initiate plan and interventions as ordered  Monitor patient's weight and dietary intake as ordered or per policy  Utilize nutrition screening tool and intervene as necessary  Determine patient's food preferences and provide high-protein, high-caloric foods as appropriate  INTERVENTIONS:  - Monitor oral intake, urinary output, labs, and treatment plans  - Assess nutrition and hydration status and recommend course of action  - Evaluate amount of meals eaten  - Assist patient with eating if necessary   - Allow adequate time for meals  - Recommend/ encourage appropriate diets, oral nutritional supplements, and vitamin/mineral supplements  - Order, calculate, and assess calorie counts as needed  - Recommend, monitor, and adjust tube feedings and TPN/PPN based on assessed needs  - Assess need for intravenous fluids  - Provide specific nutrition/hydration education as appropriate  - Include patient/family/caregiver in decisions related to nutrition  Outcome: Not Progressing   Mostly isolative to her bed, coming out of her room for meds and meals, attended IMR group  Minimally interacted with others  Did not shower or do hygiene and looks disheveled  Ate 25% of breakfast and 10% of lunch  No other behaviors or issues noted  No somatic complaints voiced  Continue to monitor

## 2020-02-28 NOTE — PROGRESS NOTES
Maggie maintained on ongoing fall and SAFE precaution  Paralee Jennifer in bed with eyes closed, breath even and unlabored   On O2 with humidifier @1L/m via nasal cannula  Continues rounding implemented   No somatic complaint overnight  No PRN needed for sleep aid   No indication of pain or discomfort   No respiratory distress   Will continue to monitor

## 2020-02-28 NOTE — PROGRESS NOTES
Patient was compliant with allowing staff to draw her blood for a CBC with diff for Clozaril monitoring but the sample that was obtained and sent to the lab was reported to have clotted and was unusable  Another blood draw was attempted this afternoon but staff was unsuccessful in obtaining another sample  Since then, she was encouraged to drink water and she agreed to allow staff to attempt to obtain a sample later today

## 2020-02-28 NOTE — ASSESSMENT & PLAN NOTE
Patient is excited that she was given intake interview by the [facility] and she has no objection to have them be the payee  for her social security benefits  She is still accusatory about certain staff and is somewhat reluctant in attending to her ADLs skills but taking showers as required but has been doing so like twice a week with staff perseveration and reminders  She does make it a point to attend groups and has been compliant with medications but seems to harbor psychosomatic symptoms about dying from choking and not being able to breathe and not being able to swallow etc   Her hygiene and grooming is still not that great  She is friendly and pleasant today but continues to talk in a stilted manner as if court talking in a court of law  Compliant with medications and no side effects reported  Appetite is fair and sleeping well  She denies any worsening of her depressive symptoms but still feels frustrated as she is here too long    He is looking forward to going out to the community on another pass with staff next week following which she expects to go out on a pass with her sister to the community and she is willing to wait it out until a bed opens up at the [facility]    Current medications:    Current Facility-Administered Medications:     acetaminophen (TYLENOL) tablet 325 mg, 325 mg, Oral, Q6H PRN, Amina Aparicio MD    acetaminophen (TYLENOL) tablet 650 mg, 650 mg, Oral, Q6H PRN, Amina Aparicio MD    acetaminophen (TYLENOL) tablet 650 mg, 650 mg, Oral, Q8H PRN, Amina Aparicio MD    albuterol (PROVENTIL HFA,VENTOLIN HFA) inhaler 2 puff, 2 puff, Inhalation, Q4H PRN, Amina Aparicio MD, 2 puff at 10/11/19 0424    aluminum-magnesium hydroxide-simethicone (MYLANTA) 200-200-20 mg/5 mL oral suspension 15 mL, 15 mL, Oral, Q4H PRN, Amina Aparicio MD, 15 mL at 01/26/20 1021    ammonium lactate (LAC-HYDRIN) 12 % lotion 1 application, 1 application, Topical, BID PRN, Amina Aparicio MD  [facility] benzonatate (TESSALON PERLES) capsule 100 mg, 100 mg, Oral, TID PRN, Jerome Lopez MD    benztropine (COGENTIN) injection 1 mg, 1 mg, Intramuscular, Q8H PRN, Jerome Lopez MD    carbamide peroxide (DEBROX) 6 5 % otic solution 5 drop, 5 drop, Left Ear, BID PRN, Elysia Puente MD    cloZAPine (CLOZARIL) tablet 25 mg, 25 mg, Oral, BID, Jerome Lopez MD, 25 mg at 02/27/20 2054    cloZAPine (CLOZARIL) tablet 50 mg, 50 mg, Oral, BID, Jerome Lopez MD, 50 mg at 02/27/20 2054    docusate sodium (COLACE) capsule 100 mg, 100 mg, Oral, BID PRN, Jerome Lopez MD    EPINEPHrine PF (ADRENALIN) 1 mg/mL injection 0 15 mg, 0 15 mg, Intramuscular, Once PRN, Jerome Lopez MD    fluticasone-vilanterol (BREO ELLIPTA) 200-25 MCG/INH inhaler 1 puff, 1 puff, Inhalation, Daily, Jerome Lopez MD, 1 puff at 02/28/20 0836    ketotifen (ZADITOR) 0 025 % ophthalmic solution 1 drop, 1 drop, Right Eye, BID PRN, Jerome Lopez MD    levothyroxine tablet 125 mcg, 125 mcg, Oral, Early Morning, Jerome Lopez MD, 125 mcg at 02/28/20 0602    magnesium hydroxide (MILK OF MAGNESIA) 400 mg/5 mL oral suspension 30 mL, 30 mL, Oral, Daily PRN, Jerome Lopez MD    montelukast (SINGULAIR) tablet 10 mg, 10 mg, Oral, HS, Jerome Lopez MD, 10 mg at 02/22/20 2104    OLANZapine (ZyPREXA) IM injection 5 mg, 5 mg, Intramuscular, Q8H PRN, Jerome Lopez MD    OLANZapine (ZyPREXA) tablet 5 mg, 5 mg, Oral, Q8H PRN, Jerome Lopez MD    ondansetron (ZOFRAN-ODT) dispersible tablet 4 mg, 4 mg, Oral, Q6H PRN, Jerome Lopez MD, 4 mg at 12/09/19 1757    pantoprazole (PROTONIX) EC tablet 40 mg, 40 mg, Oral, Early Morning, Jerome Lopez MD, 40 mg at 02/28/20 0602    polyethylene glycol (MIRALAX) packet 17 g, 17 g, Oral, Daily PRN, Jerome Lopez MD    polyvinyl alcohol (LIQUIFILM TEARS) 1 4 % ophthalmic solution 1 drop, 1 drop, Both Eyes, Q3H PRN, Jerome Lopez MD    sertraline (ZOLOFT) tablet 175 mg, 175 mg, Oral, Daily, Jerome Lopez MD, 175 mg at 02/28/20 0837    sucralfate (CARAFATE) oral suspension 1,000 mg, 1,000 mg, Oral, BID, Cat Torres MD, 1,000 mg at 02/28/20 0602    theophylline (JEF-24) 24 hr capsule 200 mg, 200 mg, Oral, Daily, Faheem Daniels MD, 200 mg at 02/28/20 0836    tiotropium [facility]) capsule for inhaler 18 mcg, 18 mcg, Inhalation, Daily, Faheem Daniels MD, 18 mcg at 02/28/20 0836    traZODone (DESYREL) tablet 25 mg, 25 mg, Oral, HS PRN, Faheem Daniels MD  Justification if on more than two antipsychotics: not applicable  Side effects if any: none    Risks , benefits, side effects and precautions of medications discussed with patient and reminded patient to let us know any problems with medications     Objective:     Vital Signs:  Vitals:    02/27/20 2015 02/27/20 2137 02/28/20 0730 02/28/20 0732   BP: 116/70  117/56 112/68   BP Location: Left arm  Left arm Left arm   Pulse: 87  80 84   Resp: 18  17    Temp: 98 °F (36 7 °C)  (!) 97 3 °F (36 3 °C)    TempSrc: Temporal  Temporal    SpO2: 94% 92%     Weight:       Height:         Appearance:  age appropriate and older than stated age somewhat disheveled poorly groomed preoccupied with good eye contact   Behavior:  deviant, evasive and guarded became is suspicious argumentative   Speech:  delayed, increased latency of response and tangential    Mood:  anxious, euphoric and irritable    Affect:  increased in intensity, increased in range, mood-congruent and redirectable   Thought Process:  circumstantial, concrete, goal directed, illogical and perserverative with tendency to become stilted   Thought Content:  delusions  grandiose and persecutory no current suicidal homicidal thoughts and under plans  No preoccupation with violence  Still with psychosomatic symptoms as before  No phobias obsessions or compulsions    Still somewhat paranoid about certain staff   Perceptual Disturbances: None    Risk Potential: Inability to care for herself and refusal to take showers regularly   Sensorium:  person, place, time/date, situation, day of week, month of year, year and time   Cognition:  grossly intact and language deficit, no deficit in recent or remote memory, aware of current events   Consciousness:  alert and awake    Attention: Fair   Intellect: Average   Insight:  Limited   Judgment: fair       Motor Activity: no abnormal movements         Recent Labs:  Results Reviewed     None          I/O Past 24 hours:  No intake/output data recorded  No intake/output data recorded  Assessment / Plan:     Schizoaffective disorder, bipolar type ([address] )  Overall status:  improving    Reason for continued inpatient care: to assess ability to maintain improvement by attending to ADLs and taking showers regular and see how she does on outing with family after another outing with staff escort next week  Acceptance by patient: accepting now  Hopefulness in recovery: living at the [facility]  Understanding of medications :  yes  Involved in reintegration process: attending groups and has off grounds and off unit privileges   Trusting in relatoinship with psychiatrist:  Mio Samuels now  Overall status :  No changes  Recommended Treatment:    Medication changes:  1) none today    Non-pharmacological treatments  1) Continue with individual therapy, group therapy, milieu therapy and occupational therapy using recovery principles and psycho-education about accepting illness and need for treatment   2) encourage to take showers twice a week and cooperate with treatment and adl skills as per her behav  plan  3) another pass with staff to community today again before pass with sister to see if she would fully comply with assigned outing before pass to community with sister  4) Prepare for interview for intake with [facility]   Safety  1) Safety/communication plan established targeting dynamic risk factors above  Discharge Plan to a [facility] at [address] / Coordination of Care    Total floor / unit time spent today 15 minutes   Greater than 50% of total time was spent with the patient and / or family counseling and / or coordination of care  Receptive to listening and learning coping skills for management of symptoms and attending to ADLs  Patient's Rights, confidentiality and exceptions to confidentiality, use of automated medical record, Jeb Patrick staff access to medical record, and consent to treatment reviewed      Christian Bennett MD

## 2020-02-28 NOTE — PLAN OF CARE
Problem: DISCHARGE PLANNING  Goal: Discharge to home or other facility with appropriate resources  Description  INTERVENTIONS:  - Conduct assessment to determine patient/family and health care team treatment goals, and need for post-acute services based on payer coverage, community resources, and patient preferences, and barriers to discharge  - Address psychosocial, clinical, and financial barriers to discharge as identified in assessment in conjunction with the patient/family and health care team  - Assist the patient in reintegration back into the community by removing barriers which may hinder a successful discharge once deemed stable  - Arrange appropriate level of post-acute services according to patient's needs and preference and payer coverage in collaboration with the physician and health care team  - Communicate with and update the patient/family, physician, and health care team regarding progress on the discharge plan  - Arrange appropriate transportation to post-acute venues    Outcome: Progressing     CM met and spoke with patient to discuss which team she would like her ACT referral submitted to  Patient requested [facility]  CM faxed completed referral to [address] [place] [address] Road Team as well as emailed Jocelyn Aguilar with ACT informing her of this fax  CM will continue to follow patient's progress and assist with discharge planning needs

## 2020-02-29 NOTE — PROGRESS NOTES
2[address] Maggie refused Incentive Spirometer; "My rib cage hurts " Declined HS snack  "I'm tired " Needed multiple prompts before came out for HS medicine & returned to bed  Wearing now her QHS humidified nasal O2 @ 1L for bed

## 2020-02-29 NOTE — PLAN OF CARE
Problem: Alteration in Thoughts and Perception  Goal: Verbalize thoughts and feelings  Description  Interventions:  - Promote a nonjudgmental and trusting relationship with the patient through active listening and therapeutic communication  - Assess patient's level of functioning, behavior and potential for risk  - Engage patient in 1 on 1 interactions for a minimum of 15 minutes each session  - Encourage patient to express fears, feelings, frustrations, and discuss symptoms    - [place] patient to reality, help patient recognize reality-based thinking   - Administer medications as ordered and assess for potential side effects  - Provide the patient education related to the signs and symptoms of the illness and desired effects of prescribed medications  Outcome: Progressing  Goal: Agree to be compliant with medication regime, as prescribed and report medication side effects  Description  Interventions:  - Offer appropriate PRN medication and supervise ingestion; conduct aims, as needed   Outcome: Progressing  Goal: Complete daily ADLs, including personal hygiene independently, as able  Description  Interventions:  - Observe, teach, and assist patient with ADLS  - Monitor and promote a balance of rest/activity, with adequate nutrition and elimination   Outcome: Progressing     Problem: Depression  Goal: Refrain from isolation  Description  Interventions:  - Develop a trusting relationship   - Encourage socialization   Outcome: Progressing     Problem: Nutrition/Hydration-ADULT  Goal: Nutrient/Hydration intake appropriate for improving, restoring or maintaining nutritional needs  Description  Monitor and assess patient's nutrition/hydration status for malnutrition  Collaborate with interdisciplinary team and initiate plan and interventions as ordered  Monitor patient's weight and dietary intake as ordered or per policy  Utilize nutrition screening tool and intervene as necessary   Determine patient's food preferences and provide high-protein, high-caloric foods as appropriate  INTERVENTIONS:  - Monitor oral intake, urinary output, labs, and treatment plans  - Assess nutrition and hydration status and recommend course of action  - Evaluate amount of meals eaten  - Assist patient with eating if necessary   - Allow adequate time for meals  - Recommend/ encourage appropriate diets, oral nutritional supplements, and vitamin/mineral supplements  - Order, calculate, and assess calorie counts as needed  - Recommend, monitor, and adjust tube feedings and TPN/PPN based on assessed needs  - Assess need for intravenous fluids  - Provide specific nutrition/hydration education as appropriate  - Include patient/family/caregiver in decisions related to nutrition  Outcome: Progressing     [address] was visible early in shift sitting out in [place] chair to converse w/staff or some peers passing by  Did request opportunity to shower & groom  T assisted her by setting up shower, helping her w/hair care  Came up on own for supper medicine, ate 100% of meal & had an Ensure  Is her usual concerned about her lab results today & needing reassurance that values were fine  Since conclusion of meal, resting in bed

## 2020-02-29 NOTE — PROGRESS NOTES
~Maggie maintained on ongoing fall and SAFE precaution   Laying in bed with eyes closed, breath even and unlabored   On O2 with humidifier @1L/m via nasal cannula  Continues rounding implemented   No somatic complaint overnight  No PRN needed for sleep aid   No indication of pain or discomfort   No respiratory distress   Will continue to monitor    ~Maggie has scheduled lab to be obtain in the morning CMP, CBC w/Diff, TSH and magnesium

## 2020-02-29 NOTE — PLAN OF CARE
Problem: Alteration in Thoughts and Perception  Goal: Agree to be compliant with medication regime, as prescribed and report medication side effects  Description  Interventions:  - Offer appropriate PRN medication and supervise ingestion; conduct aims, as needed   Outcome: Progressing  Goal: Attend and participate in unit activities, including therapeutic, recreational, and educational groups  Description  Interventions:  - Provide therapeutic and educational activities daily, encourage attendance and participation, and document same in the medical record     CERTIFIED PEER SPECIALIST INTERVENTIONS:    Complete peer assessment with patient to assess their needs and identify their goals to complete while in the recovery program as well as once discharged into the community  Patient will complete WRAP Plan, Crisis Plan and 5 Life Domains  Patient will attend 50% of groups offered on the unit  Patient will complete a goal card weekly  Outcome: Not Progressing  Goal: Complete daily ADLs, including personal hygiene independently, as able  Description  Interventions:  - Observe, teach, and assist patient with ADLS  - Monitor and promote a balance of rest/activity, with adequate nutrition and elimination   Outcome: Not Progressing     Problem: Depression  Goal: Refrain from isolation  Description  Interventions:  - Develop a trusting relationship   - Encourage socialization   Outcome: Jannette Efe Gerard has been isolative to her room for the majority of the shift, only coming out for meals and her scheduled medications  Blunted in affect, disheveled in appearance  Did not tend to her hygiene  No somatic complaints verbalized at this time  No groups attended  Remains laying in her bed  Behaviors controlled  Will continue to monitor

## 2020-02-29 NOTE — PROGRESS NOTES
Progress Note - Behavioral Health   Homero Massey 60 y o  female MRN: [id number]  Unit/Bed#: [facility] 110-02 Encounter: [id number]    The patient was seen for continuing care and reviewed with treatment team   Staff reports patient continues to be somatic  Staff also reports patient had a visit from [address] [place] [address] for possible placement  Patient was observed sitting in the dining room  She is pleasant and cooperative upon approach, displays good eye contact throughout the encounter with an appropriate affect noted  She currently rates her depression as 5/10 with mild anxiety noted  She denies any suicidal/homicidal ideation or auditory/visual hallucinations and does not appear to be responding to internal stimuli at time of encounter  She is reporting adequate appetite and sleep with decreased energy level  Patient states she has concerns with her swallowing issues but did state she was evaluated and it was ruled somatic concerns  She is compliant with her medications and reports no side effects at this time  Denies pain  No agitation noted      Mental Status Evaluation:  Appearance:  Adequate hygiene and grooming and Good eye contact   Behavior:  cooperative and friendly   Mood:  euthymic   Affect: mood-congruent   Speech: Normal rate and Normal volume   Thought Process:  Circumstantial   Thought Content:  Does not verbalize delusional material   Perceptual Disturbances: Denies hallucinations and does not appear to be responding to internal stimuli   Risk Potential: No suicidal or homicidal ideation   Attention/Concentration attention span and concentration were age appropriate   Orientation:   Alert and awake   Gait/Station: Not observed   Motor Activity: No abnormal movement noted     Progress Toward Goals:  No change    Assessment/Plan    Principal Problem:    Schizoaffective disorder, bipolar type ([facility] )  Active Problems:    COPD with asthma ([facility] )    Acquired hypothyroidism    Gastroesophageal reflux disease without esophagitis    At risk for aspiration      Recommended Treatment:   1  Continue with pharmacotherapy, group therapy, milieu therapy and occupational therapy  2  Continue with safety checks per unit protocol  3  Continue with Clozaril monitoring  4  Continue with day pass as appropriate    5  The patient will be maintained on the following medications:    Current Facility-Administered Medications:  acetaminophen 325 mg Oral Q6H PRN Sindy Day MD   acetaminophen 650 mg Oral Q6H PRN Sindy Day MD   acetaminophen 650 mg Oral Q8H PRN Sindy Day MD   albuterol 2 puff Inhalation Q4H PRN Sindy Day MD   aluminum-magnesium hydroxide-simethicone 15 mL Oral Q4H PRN Sindy Day MD   ammonium lactate 1 application Topical BID PRN Sindy Day MD   benzonatate 100 mg Oral TID PRN Sindy Day MD   benztropine 1 mg Intramuscular Q8H PRN Sindy Day MD   carbamide peroxide 5 drop Left Ear BID PRN Lena Dunbar MD   cloZAPine 25 mg Oral BID Sindy Day MD   cloZAPine 50 mg Oral BID Sindy Day MD   docusate sodium 100 mg Oral BID PRN Sindy Day MD   EPINEPHrine PF 0 15 mg Intramuscular Once PRN Sindy Day MD   fluticasone-vilanterol 1 puff Inhalation Daily Sindy Day MD   ketotifen 1 drop Right Eye BID PRN Sindy Day MD   levothyroxine 125 mcg Oral Early Morning Sindy Day MD   magnesium hydroxide 30 mL Oral Daily PRN Sindy Day MD   montelukast 10 mg Oral HS Sindy Day MD   OLANZapine 5 mg Intramuscular Q8H PRN Sindy Day MD   OLANZapine 5 mg Oral Q8H PRN Sindy Day MD   ondansetron 4 mg Oral Q6H PRN Sindy Day MD   pantoprazole 40 mg Oral Early Morning Sindy Day MD   polyethylene glycol 17 g Oral Daily PRN Sindy Day MD   polyvinyl alcohol 1 drop Both Eyes Q3H PRN Sindy Day MD   sertraline 175 mg Oral Daily Sindy Day MD   sucralfate 1,000 mg Oral BID Saurav Berman MD   theophylline 200 mg Oral Daily Sindy Day MD   tiotropium 18 mcg Inhalation Daily Sindy Day MD traZODone 25 mg Oral HS PRN Felisa Florence MD       Risks, benefits and possible side effects of Medications:   Risks, benefits, and possible side effects of medications explained to patient and patient verbalizes understanding  Portions of the record may have been created with voice recognition software  Occasional wrong word or "sound a like" substitutions may have occurred due to the inherent limitations of voice recognition software  Read the chart carefully and recognize, using context, where substitutions have occurred

## 2020-02-29 NOTE — PLAN OF CARE
Problem: Alteration in Thoughts and Perception  Goal: Agree to be compliant with medication regime, as prescribed and report medication side effects  Description  Interventions:  - Offer appropriate PRN medication and supervise ingestion; conduct aims, as needed   Outcome: Progressing     Problem: Nutrition/Hydration-ADULT  Goal: Nutrient/Hydration intake appropriate for improving, restoring or maintaining nutritional needs  Description  Monitor and assess patient's nutrition/hydration status for malnutrition  Collaborate with interdisciplinary team and initiate plan and interventions as ordered  Monitor patient's weight and dietary intake as ordered or per policy  Utilize nutrition screening tool and intervene as necessary  Determine patient's food preferences and provide high-protein, high-caloric foods as appropriate       INTERVENTIONS:  - Monitor oral intake, urinary output, labs, and treatment plans  - Assess nutrition and hydration status and recommend course of action  - Evaluate amount of meals eaten  - Assist patient with eating if necessary   - Allow adequate time for meals  - Recommend/ encourage appropriate diets, oral nutritional supplements, and vitamin/mineral supplements  - Order, calculate, and assess calorie counts as needed  - Recommend, monitor, and adjust tube feedings and TPN/PPN based on assessed needs  - Assess need for intravenous fluids  - Provide specific nutrition/hydration education as appropriate  - Include patient/family/caregiver in decisions related to nutrition  Outcome: Progressing     Problem: Alteration in Thoughts and Perception  Goal: Attend and participate in unit activities, including therapeutic, recreational, and educational groups  Description  Interventions:  - Provide therapeutic and educational activities daily, encourage attendance and participation, and document same in the medical record     [address] :    Complete peer assessment with patient to assess their needs and identify their goals to complete while in the recovery program as well as once discharged into the community  Patient will complete WRAP Plan, Crisis Plan and 5 Life Domains  Patient will attend 50% of groups offered on the unit  Patient will complete a goal card weekly  Outcome: Not Progressing  Goal: Complete daily ADLs, including personal hygiene independently, as able  Description  Interventions:  - Observe, teach, and assist patient with ADLS  - Monitor and promote a balance of rest/activity, with adequate nutrition and elimination   Outcome: Not Progressing     Problem: Depression  Goal: Refrain from isolation  Description  Interventions:  - Develop a trusting relationship   - Encourage socialization   Outcome: Not Progressing     2015 Gregoria Arsen has overall been isolative to her room & bed, reluctant to come out  Did emerge for supper medicine, & to eat 100% of meal plus Ensure  She is unkempt, no initiation of any hygiene, no shower in 4 days  Would not leave room to come for her HS vital signs   When it was presented that this is an expectation, staff not going to her, replied, "Well then I refuse them tonight " Did not respond to invitation to [place] [id number]

## 2020-03-01 NOTE — PROGRESS NOTES
[address] Jose Heather did not attend PM Group, did not have HS snack, declined Incentive Spirometer, all because again "too tired"  Did accept her HS medicine  Wearing now her QHS humidified nasal O2 @ 1L for bed

## 2020-03-01 NOTE — PLAN OF CARE
Problem: Alteration in Thoughts and Perception  Goal: Agree to be compliant with medication regime, as prescribed and report medication side effects  Description  Interventions:  - Offer appropriate PRN medication and supervise ingestion; conduct aims, as needed   Outcome: Progressing  Goal: Complete daily ADLs, including personal hygiene independently, as able  Description  Interventions:  - Observe, teach, and assist patient with ADLS  - Monitor and promote a balance of rest/activity, with adequate nutrition and elimination   Outcome: Not Progressing   Alva Jay has been isolative to her room for the majority of the shift, only coming out for meals and her scheduled medications  Blunted in affect, disheveled in appearance  Did not tend to her hygiene  Somatic complaints regarding her fatigue  No groups attended  Remains laying in her bed  Behaviors controlled  Will continue to monitor

## 2020-03-01 NOTE — PLAN OF CARE
Problem: Alteration in Thoughts and Perception  Goal: Verbalize thoughts and feelings  Description  Interventions:  - Promote a nonjudgmental and trusting relationship with the patient through active listening and therapeutic communication  - Assess patient's level of functioning, behavior and potential for risk  - Engage patient in 1 on 1 interactions for a minimum of 15 minutes each session  - Encourage patient to express fears, feelings, frustrations, and discuss symptoms    - [place] patient to reality, help patient recognize reality-based thinking   - Administer medications as ordered and assess for potential side effects  - Provide the patient education related to the signs and symptoms of the illness and desired effects of prescribed medications  Outcome: Progressing  Goal: Agree to be compliant with medication regime, as prescribed and report medication side effects  Description  Interventions:  - Offer appropriate PRN medication and supervise ingestion; conduct aims, as needed   Outcome: Progressing     Problem: Depression  Goal: Refrain from isolation  Description  Interventions:  - Develop a trusting relationship   - Encourage socialization   Outcome: Not Progressing     [address] Agustin Edgar has been isolative to her room & bed  She continues to c/o fatigue, is subdued  Did come out for supper medicine  Was visited by sister w/warm interactions between them; ate the fresh strawberries sister brought  For meal ate on 25%, but, drank an Ensure  Back in bed now

## 2020-03-01 NOTE — PROGRESS NOTES
Progress Note - Behavioral Health   Sen Garvey 60 y o  female MRN: [id number]  Unit/Bed#: [facility] 654-10 Encounter: [id number]    The patient was seen for continuing care and reviewed with treatment team   Staff reports no significant change in the past 24 hours  Patient was observed lying in bed  She is pleasant and cooperative upon approach, displays good eye contact throughout the encounter, with a constricted affect noted  Patient continues to be somatic and reports fullness and belly and tightness and ribs which he states she needs to get an echocardiogram as she feels it may be heart  Vital signs normal and patient does not appear to be in distress  Will make medical team aware for further observation  She is currently rating her depression as a 4/10 with mild anxiety noted  Continues to deny suicidal/homicidal ideation or auditory/visual hallucinations and does not appear to be responding to internal stimuli at time of encounter  She continues to report adequate appetite and sleep  Patient reports decreased energy and states she is increasingly fatigued this morning  Patient is compliant with medications and reports no side effects at this time  Denies pain  No agitation noted      Mental Status Evaluation:  Appearance:  Adequate hygiene and grooming and Good eye contact   Behavior:  cooperative and friendly   Mood:  euthymic   Affect: constricted   Speech: Normal rate and Normal volume   Thought Process:  Circumstantial   Thought Content:  Does not verbalize delusional material   Perceptual Disturbances: Denies hallucinations and does not appear to be responding to internal stimuli   Risk Potential: No suicidal or homicidal ideation   Attention/Concentration attention span and concentration were age appropriate   Orientation:   Alert and awake   Gait/Station: Not observed   Motor Activity: No abnormal movement noted     Progress Toward Goals:  No change    Assessment/Plan    Principal Problem:    Schizoaffective disorder, bipolar type ([facility] )  Active Problems:    COPD with asthma ([facility] )    Acquired hypothyroidism    Gastroesophageal reflux disease without esophagitis    At risk for aspiration      Recommended Treatment:   1  Continue with pharmacotherapy, group therapy, milieu therapy and occupational therapy  2  Continue with safety checks per unit protocol  3  Continue with Clozaril monitoring  4  Continue with day pass as appropriate    5  The patient will be maintained on the following medications:    Current Facility-Administered Medications:  acetaminophen 325 mg Oral Q6H PRN Jaz Beasley MD   acetaminophen 650 mg Oral Q6H PRN Jaz Beasley MD   acetaminophen 650 mg Oral Q8H PRN Jaz Beasley MD   albuterol 2 puff Inhalation Q4H PRN Jaz Beasley MD   aluminum-magnesium hydroxide-simethicone 15 mL Oral Q4H PRN Jaz Beasley MD   ammonium lactate 1 application Topical BID PRN Jaz Beasley MD   benzonatate 100 mg Oral TID PRN Jaz Beasley MD   benztropine 1 mg Intramuscular Q8H PRN Jaz Beasley MD   carbamide peroxide 5 drop Left Ear BID PRN Vlad Ballard MD   cloZAPine 25 mg Oral BID Jaz Beasley MD   cloZAPine 50 mg Oral BID Jaz Beasley MD   docusate sodium 100 mg Oral BID PRN Jza Beasley MD   EPINEPHrine PF 0 15 mg Intramuscular Once PRN Jaz Beasley MD   fluticasone-vilanterol 1 puff Inhalation Daily Jaz Beasley MD   ketotifen 1 drop Right Eye BID PRN Jaz Beasley MD   levothyroxine 125 mcg Oral Early Morning Jaz Beasley MD   magnesium hydroxide 30 mL Oral Daily PRN Jaz Beasley MD   montelukast 10 mg Oral HS Jaz Beasley MD   OLANZapine 5 mg Intramuscular Q8H PRN Jaz Beasley MD   OLANZapine 5 mg Oral Q8H PRN Jaz Beasley MD   ondansetron 4 mg Oral Q6H PRN Jaz Beasley MD   pantoprazole 40 mg Oral Early Morning Jaz Beasley MD   polyethylene glycol 17 g Oral Daily PRN Jaz Beasley MD   polyvinyl alcohol 1 drop Both Eyes Q3H PRN Jaz Beasley MD   sertraline 175 mg Oral Daily Jaz Beasley MD   sucralfate 1,000 mg Oral BID Janece Hashimoto, MD   theophylline 200 mg Oral Daily Shi Pham MD   tiotropium 18 mcg Inhalation Daily Shi Pham MD   traZODone 25 mg Oral HS PRN Shi Pham MD       Risks, benefits and possible side effects of Medications:   Risks, benefits, and possible side effects of medications explained to patient and patient verbalizes understanding  Portions of the record may have been created with voice recognition software  Occasional wrong word or "sound a like" substitutions may have occurred due to the inherent limitations of voice recognition software  Read the chart carefully and recognize, using context, where substitutions have occurred

## 2020-03-02 NOTE — QUICK NOTE
Patient examined at bedside, no acute distress and resting comfortably  Patient denies any complaints  She denies any fever, chills, lightheadedness, nausea, vomiting, chest pain, and shortness of breath       Physical   NAD, resting comfortably  No Murmurs, rubs and gallops,   Lung clear to auscultation bilaterally  Abdomen soft, non-tender, and nondistended      A/P  -Continue current medical management

## 2020-03-02 NOTE — PROGRESS NOTES
Sleeping with no distress noted, O2 on at 1L via NC  Safe precautions in place and maintained   Monitoring continues

## 2020-03-02 NOTE — PLAN OF CARE

## 2020-03-02 NOTE — PROGRESS NOTES
Progress Note - Grecia Mealing 1955, 60 y o  female MRN: [id number]    Unit/Bed#: [facility] 110-02 Encounter: [id number]    Primary Care Provider: Srinivasan Charles PA-C   Date and time admitted to hospital: 7/23/2019  5:30 PM        * Schizoaffective disorder, bipolar type [facility])  Assessment & Plan  Patient is now happy the maybe a bed opening up at the [address] [facility] personal home care home [place] health [place] office  She is still psychosomatic and is demanding a cardiac workup despite reminding her that the cardiologist did not want that to be done at all  She is still afraid that she may catch the corona virus as she heard about it  She is friendly pleasant but still somewhat suspicious at times but is compliant with medications  She claims to have taken a shower over the weekend as she said she was going to  She he is compliant with medications and reports no side effects or problems and is eating fairly well even though she claims she does indeed all the meals and is sleeping well  Her hygiene is fair and she relates well and was found on her bed and did get up and talk to me       Current medications:    Current Facility-Administered Medications:     acetaminophen (TYLENOL) tablet 325 mg, 325 mg, Oral, Q6H PRN, Shilpa Trujillo MD    acetaminophen (TYLENOL) tablet 650 mg, 650 mg, Oral, Q6H PRN, Shilpa Trujillo MD    acetaminophen (TYLENOL) tablet 650 mg, 650 mg, Oral, Q8H PRN, Shilpa Trujillo MD    albuterol (PROVENTIL HFA,VENTOLIN HFA) inhaler 2 puff, 2 puff, Inhalation, Q4H PRN, Shilpa Trujillo MD, 2 puff at 10/11/19 0424    aluminum-magnesium hydroxide-simethicone (MYLANTA) 200-200-20 mg/5 mL oral suspension 15 mL, 15 mL, Oral, Q4H PRN, Shilpa Trujillo MD, 15 mL at 01/26/20 1021    ammonium lactate (LAC-HYDRIN) 12 % lotion 1 application, 1 application, Topical, BID PRN, Shilpa Trujillo MD    benzonatate (TESSALON PERLES) capsule 100 mg, 100 mg, Oral, TID PRN, Shilpa Trujillo MD  [facility] benztropine (COGENTIN) injection 1 mg, 1 mg, Intramuscular, Q8H PRN, Felisa Florence MD    carbamide peroxide (DEBROX) 6 5 % otic solution 5 drop, 5 drop, Left Ear, BID PRN, Jessica Pitts MD    cloZAPine (CLOZARIL) tablet 25 mg, 25 mg, Oral, BID, Felisa Florence MD, 25 mg at 03/01/20 2105    cloZAPine (CLOZARIL) tablet 50 mg, 50 mg, Oral, BID, Felisa Florence MD, 50 mg at 03/01/20 2105    docusate sodium (COLACE) capsule 100 mg, 100 mg, Oral, BID PRN, Felisa Florence MD    EPINEPHrine PF (ADRENALIN) 1 mg/mL injection 0 15 mg, 0 15 mg, Intramuscular, Once PRN, Felisa Florence MD    fluticasone-vilanterol (BREO ELLIPTA) 200-25 MCG/INH inhaler 1 puff, 1 puff, Inhalation, Daily, Felisa Florence MD, 1 puff at 03/02/20 0845    ketotifen (ZADITOR) 0 025 % ophthalmic solution 1 drop, 1 drop, Right Eye, BID PRN, Felisa Florence MD    levothyroxine tablet 125 mcg, 125 mcg, Oral, Early Morning, Felisa Florence MD, 125 mcg at 03/02/20 0553    magnesium hydroxide (MILK OF MAGNESIA) 400 mg/5 mL oral suspension 30 mL, 30 mL, Oral, Daily PRN, Felisa Florence MD    montelukast (SINGULAIR) tablet 10 mg, 10 mg, Oral, HS, Felisa Florence MD, 10 mg at 02/22/20 2104    OLANZapine (ZyPREXA) IM injection 5 mg, 5 mg, Intramuscular, Q8H PRN, Felisa Florence MD    OLANZapine (ZyPREXA) tablet 5 mg, 5 mg, Oral, Q8H PRN, Felisa Florence MD    ondansetron (ZOFRAN-ODT) dispersible tablet 4 mg, 4 mg, Oral, Q6H PRN, Felisa Florence MD, 4 mg at 12/09/19 1757    pantoprazole (PROTONIX) EC tablet 40 mg, 40 mg, Oral, Early Morning, Felisa Florence MD, 40 mg at 03/02/20 0553    polyethylene glycol (MIRALAX) packet 17 g, 17 g, Oral, Daily PRN, Felisa Florence MD    polyvinyl alcohol (LIQUIFILM TEARS) 1 4 % ophthalmic solution 1 drop, 1 drop, Both Eyes, Q3H PRN, Felisa Florence MD    sertraline (ZOLOFT) tablet 175 mg, 175 mg, Oral, Daily, Felisa Florence MD, 175 mg at 03/02/20 0846    sucralfate (CARAFATE) oral suspension 1,000 mg, 1,000 mg, Oral, BID, Cat Torres MD, 1,000 mg at 03/02/20 0607    theophylline (JEF-24) 24 hr capsule 200 mg, 200 mg, Oral, Daily, Jerome Lopez MD, 200 mg at 03/02/20 0846    tiotropium [facility]) capsule for inhaler 18 mcg, 18 mcg, Inhalation, Daily, Jerome Lopez MD, 18 mcg at 03/02/20 0845    traZODone (DESYREL) tablet 25 mg, 25 mg, Oral, HS PRN, Jerome Lopez MD  Justification if on more than two antipsychotics: not applicable  Side effects if any: none    Risks , benefits, side effects and precautions of medications discussed with patient and reminded patient to let us know any problems with medications     Objective:     Vital Signs:  Vitals:    03/01/20 0705 03/01/20 1556 03/01/20 2015 03/02/20 0700   BP: 91/62 100/59 (!) 80/60 93/68   BP Location: Left arm Left arm Left arm Right arm   Pulse: 72 73 94 74   Resp: 18 16 16 18   Temp:  (!) 97 °F (36 1 °C) (!) 97 2 °F (36 2 °C) 97 6 °F (36 4 °C)   TempSrc:  Temporal Temporal    SpO2:  94% 92% 94%   Weight:       Height:         Appearance:  age appropriate and older than stated age better groomed with better eye contact   Behavior:  deviant, evasive and guarded becoming argumentative with suspicious at times   Speech:  delayed, increased latency of response and tangential    Mood:  anxious, euphoric and irritable    Affect:  increased in intensity, increased in range, mood-congruent and redirectable   Thought Process:  circumstantial, concrete, goal directed, illogical and perserverative with tendency to become still   Thought Content:  delusions  grandiose and persecutory no current suicidal homicidal thoughts or plans  No  preoccupation with violence  Still with some paranoia and accusatory to certain staff tampering with her oxygen concentrator night  No phobias obsessions or compulsions     Perceptual Disturbances: None    Risk Potential: Inability to care for herself and refusal to take showers regularly   Sensorium:  person, place, time/date, situation, day of week, month of year, year and time Cognition:  grossly intact no language deficit, no deficit in recent or remote memory, aware of current events like the corona virus scare   Consciousness:  alert and awake    Attention: Fair   Intellect: Average   Insight:  Limited   Judgment: fair       Motor Activity: no abnormal movements         Recent Labs:  Results Reviewed     None          I/O Past 24 hours:  No intake/output data recorded  No intake/output data recorded  Assessment / Plan:     Schizoaffective disorder, bipolar type ([address] )  Overall status:  improving    Reason for continued inpatient care: to assess ability to maintain improvement by attending to ADLs and taking showers regular and see how she does on outing with family after another outing with staff escort next week  Acceptance by patient: accepting now  Hopefulness in recovery: living at the [facility] soon  Understanding of medications :  yes  Involved in reintegration process: attending groups and has off grounds and off unit privileges   Trusting in relatoinship with psychiatrist:  Ev Cosby now  Overall status :  No changes  Recommended Treatment:    Medication changes:  1) none today    Non-pharmacological treatments  1) Continue with individual therapy, group therapy, milieu therapy and occupational therapy using recovery principles and psycho-education about accepting illness and need for treatment   2) encourage to take showers twice a week and cooperate with treatment and adl skills as per her behav  plan  3) another pass with staff to community today again before pass with sister to see if she would fully comply with assigned outing before pass to community with sister  4) Prepare for the [facility] as she already had the intake interview  Safety  1) Safety/communication plan established targeting dynamic risk factors above  Discharge Plan back to a [facility] at [address]  / Coordination of Care    Total floor / unit time spent today 15 minutes   Greater than 50% of total time was spent with the patient and / or family counseling and / or coordination of care  Receptive to listening and learning coping skills for management of symptoms and attending to ADLs  Patient's Rights, confidentiality and exceptions to confidentiality, use of automated medical record, [facility] Patrick staff access to medical record, and consent to treatment reviewed      Vincent Olivares MD

## 2020-03-02 NOTE — PROGRESS NOTES
03/02/20 0800   Team Meeting   Meeting Type Daily Rounds   Team Members Present   Team Members Present Physician;Nurse;; Other (Discipline and Name)   Physician Team Member Dr Rosalinda Hinds, RN   Care Management Team Member Brenda Arnold   Other (Discipline and Name) Josse Mcfarland LCSW     No changes in behaviors  Slept

## 2020-03-02 NOTE — ASSESSMENT & PLAN NOTE
Patient is now happy the maybe a bed opening up at the [facility] home care home as reported by mental health [place] the county office  She is still psychosomatic and is demanding a cardiac workup despite reminding her that the cardiologist did not want that to be done at all  She is still afraid that she may catch the corona virus as she heard about it  She is friendly pleasant but still somewhat suspicious at times but is compliant with medications  She claims to have taken a shower over the weekend as she said she was going to  She he is compliant with medications and reports no side effects or problems and is eating fairly well even though she claims she does indeed all the meals and is sleeping well  Her hygiene is fair and she relates well and was found on her bed and did get up and talk to me       Current medications:    Current Facility-Administered Medications:     acetaminophen (TYLENOL) tablet 325 mg, 325 mg, Oral, Q6H PRN, Tree Madden MD    acetaminophen (TYLENOL) tablet 650 mg, 650 mg, Oral, Q6H PRN, Tree Madden MD    acetaminophen (TYLENOL) tablet 650 mg, 650 mg, Oral, Q8H PRN, Tree Madden MD    albuterol (PROVENTIL HFA,VENTOLIN HFA) inhaler 2 puff, 2 puff, Inhalation, Q4H PRN, Tree Madden MD, 2 puff at 10/11/19 0424    aluminum-magnesium hydroxide-simethicone (MYLANTA) 200-200-20 mg/5 mL oral suspension 15 mL, 15 mL, Oral, Q4H PRN, Tree Madden MD, 15 mL at 01/26/20 1021    ammonium lactate (LAC-HYDRIN) 12 % lotion 1 application, 1 application, Topical, BID PRN, Tree Madden MD    benzonatate (TESSALON PERLES) capsule 100 mg, 100 mg, Oral, TID PRN, Tree Madden MD    benztropine (COGENTIN) injection 1 mg, 1 mg, Intramuscular, Q8H PRN, Tree Madden MD    carbamide peroxide (DEBROX) 6 5 % otic solution 5 drop, 5 drop, Left Ear, BID PRN, Isauro Lopez MD    cloZAPine (CLOZARIL) tablet 25 mg, 25 mg, Oral, BID, Tree Madden MD, 25 mg at 03/01/20 2277    cloZAPine (CLOZARIL) tablet 50 mg, 50 mg, Oral, BID, Shi Pham MD, 50 mg at 03/01/20 2105    docusate sodium (COLACE) capsule 100 mg, 100 mg, Oral, BID PRN, Shi Pham MD    EPINEPHrine PF (ADRENALIN) 1 mg/mL injection 0 15 mg, 0 15 mg, Intramuscular, Once PRN, Shi Pham MD    fluticasone-vilanterol (BREO ELLIPTA) 200-25 MCG/INH inhaler 1 puff, 1 puff, Inhalation, Daily, Shi Pham MD, 1 puff at 03/02/20 0845    ketotifen (ZADITOR) 0 025 % ophthalmic solution 1 drop, 1 drop, Right Eye, BID PRN, Shi Pham MD    levothyroxine tablet 125 mcg, 125 mcg, Oral, Early Morning, Shi Pham MD, 125 mcg at 03/02/20 0553    magnesium hydroxide (MILK OF MAGNESIA) 400 mg/5 mL oral suspension 30 mL, 30 mL, Oral, Daily PRN, Shi Pham MD    montelukast (SINGULAIR) tablet 10 mg, 10 mg, Oral, HS, Shi Pham MD, 10 mg at 02/22/20 2104    OLANZapine (ZyPREXA) IM injection 5 mg, 5 mg, Intramuscular, Q8H PRN, Shi Pham MD    OLANZapine (ZyPREXA) tablet 5 mg, 5 mg, Oral, Q8H PRN, Shi Pham MD    ondansetron (ZOFRAN-ODT) dispersible tablet 4 mg, 4 mg, Oral, Q6H PRN, Shi Pham MD, 4 mg at 12/09/19 1757    pantoprazole (PROTONIX) EC tablet 40 mg, 40 mg, Oral, Early Morning, Shi Pham MD, 40 mg at 03/02/20 0553    polyethylene glycol (MIRALAX) packet 17 g, 17 g, Oral, Daily PRN, Shi Pham MD    polyvinyl alcohol (LIQUIFILM TEARS) 1 4 % ophthalmic solution 1 drop, 1 drop, Both Eyes, Q3H PRN, Shi Pham MD    sertraline (ZOLOFT) tablet 175 mg, 175 mg, Oral, Daily, Shi Pham MD, 175 mg at 03/02/20 0846    sucralfate (CARAFATE) oral suspension 1,000 mg, 1,000 mg, Oral, BID, Cat Torres MD, 1,000 mg at 03/02/20 0607    theophylline (JEF-24) 24 hr capsule 200 mg, 200 mg, Oral, Daily, Shi Pham MD, 200 mg at 03/02/20 0846    tiotropium (SPIRIVA) capsule for inhaler 18 mcg, 18 mcg, Inhalation, Daily, Shi Pham MD, 18 mcg at 03/02/20 0845    traZODone (DESYREL) tablet 25 mg, 25 mg, Oral, HS PRN, Shaggy Rangel MD  Justification if on more than two antipsychotics: not applicable  Side effects if any: none    Risks , benefits, side effects and precautions of medications discussed with patient and reminded patient to let us know any problems with medications     Objective:     Vital Signs:  Vitals:    03/01/20 0705 03/01/20 1556 03/01/20 2015 03/02/20 0700   BP: 91/62 100/59 (!) 80/60 93/68   BP Location: Left arm Left arm Left arm Right arm   Pulse: 72 73 94 74   Resp: 18 16 16 18   Temp:  (!) 97 °F (36 1 °C) (!) 97 2 °F (36 2 °C) 97 6 °F (36 4 °C)   TempSrc:  Temporal Temporal    SpO2:  94% 92% 94%   Weight:       Height:         Appearance:  age appropriate and older than stated age better groomed with better eye contact   Behavior:  deviant, evasive and guarded becoming argumentative with suspicious at times   Speech:  delayed, increased latency of response and tangential    Mood:  anxious, euphoric and irritable    Affect:  increased in intensity, increased in range, mood-congruent and redirectable   Thought Process:  circumstantial, concrete, goal directed, illogical and perserverative with tendency to become still   Thought Content:  delusions  grandiose and persecutory no current suicidal homicidal thoughts or plans  No  preoccupation with violence  Still with some paranoia and accusatory to certain staff tampering with her oxygen concentrator night  No phobias obsessions or compulsions     Perceptual Disturbances: None    Risk Potential: Inability to care for herself and refusal to take showers regularly   Sensorium:  person, place, time/date, situation, day of week, month of year, year and time   Cognition:  grossly intact no language deficit, no deficit in recent or remote memory, aware of current events like the corona virus scare   Consciousness:  alert and awake    Attention: Fair   Intellect: Average   Insight:  Limited   Judgment: fair       Motor Activity: no abnormal movements Recent Labs:  Results Reviewed     None          I/O Past 24 hours:  No intake/output data recorded  No intake/output data recorded  Assessment / Plan:     Schizoaffective disorder, bipolar type ([address] )  Overall status:  improving    Reason for continued inpatient care: to assess ability to maintain improvement by attending to ADLs and taking showers regular and see how she does on outing with family after another outing with staff escort next week  Acceptance by patient: accepting now  Hopefulness in recovery: living at the [facility] soon  Understanding of medications :  yes  Involved in reintegration process: attending groups and has off grounds and off unit privileges   Trusting in relatoinship with psychiatrist:  Freda Cramer now  Overall status :  No changes  Recommended Treatment:    Medication changes:  1) none today    Non-pharmacological treatments  1) Continue with individual therapy, group therapy, milieu therapy and occupational therapy using recovery principles and psycho-education about accepting illness and need for treatment   2) encourage to take showers twice a week and cooperate with treatment and adl skills as per her behav  plan  3) another pass with staff to community today again before pass with sister to see if she would fully comply with assigned outing before pass to community with sister  4) Prepare for the [facility] as she already had the intake interview  Safety  1) Safety/communication plan established targeting dynamic risk factors above  Discharge Plan back to a [facility] at [address] / Coordination of Care    Total floor / unit time spent today 15 minutes  Greater than 50% of total time was spent with the patient and / or family counseling and / or coordination of care  Receptive to listening and learning coping skills for management of symptoms and attending to ADLs       Patient's Rights, confidentiality and exceptions to confidentiality, use of automated medical record, [employer] staff access to medical record, and consent to treatment reviewed      Isidoro Gonzalez MD

## 2020-03-02 NOTE — TREATMENT TEAM
03/02/20 1100   Activity/Group Checklist   Group   (IMR/Community Group )   Attendance Attended   Attendance Duration (min) 46-60   Interactions Interacted appropriately   Affect/Mood Appropriate;Normal range   Goals Achieved Identified feelings; Able to engage in interactions; Able to listen to others; Able to self-disclose

## 2020-03-02 NOTE — PLAN OF CARE
Problem: Alteration in Thoughts and Perception  Goal: Verbalize thoughts and feelings  Description  Interventions:  - Promote a nonjudgmental and trusting relationship with the patient through active listening and therapeutic communication  - Assess patient's level of functioning, behavior and potential for risk  - Engage patient in 1 on 1 interactions for a minimum of 15 minutes each session  - Encourage patient to express fears, feelings, frustrations, and discuss symptoms    - [place] patient to reality, help patient recognize reality-based thinking   - Administer medications as ordered and assess for potential side effects  - Provide the patient education related to the signs and symptoms of the illness and desired effects of prescribed medications  Outcome: Progressing  Goal: Agree to be compliant with medication regime, as prescribed and report medication side effects  Description  Interventions:  - Offer appropriate PRN medication and supervise ingestion; conduct aims, as needed   Outcome: Progressing     Problem: Depression  Goal: Refrain from isolation  Description  Interventions:  - Develop a trusting relationship   - Encourage socialization   Outcome: Progressing     [address] was visible in milieu through supper hour, sitting out in dining room or in [place] chair @ phone when it is not in use  Pleasant, interactive w/staff & peers  She was seen by Dr Mack Galloway aired w/him such concerns as getting a "hospital bed", getting her O2 upon discharge  He reviewed her EKG from today  She ate 50% of meal plus an Ensure  Came up for supper medicine  Resting now in room

## 2020-03-02 NOTE — PLAN OF CARE
Problem: Alteration in Thoughts and Perception  Goal: Verbalize thoughts and feelings  Description  Interventions:  - Promote a nonjudgmental and trusting relationship with the patient through active listening and therapeutic communication  - Assess patient's level of functioning, behavior and potential for risk  - Engage patient in 1 on 1 interactions for a minimum of 15 minutes each session  - Encourage patient to express fears, feelings, frustrations, and discuss symptoms    - [place] patient to reality, help patient recognize reality-based thinking   - Administer medications as ordered and assess for potential side effects  - Provide the patient education related to the signs and symptoms of the illness and desired effects of prescribed medications  Outcome: Progressing  Goal: Agree to be compliant with medication regime, as prescribed and report medication side effects  Description  Interventions:  - Offer appropriate PRN medication and supervise ingestion; conduct aims, as needed   Outcome: Progressing  Goal: Attend and participate in unit activities, including therapeutic, recreational, and educational groups  Description  Interventions:  - Provide therapeutic and educational activities daily, encourage attendance and participation, and document same in the medical record     CERTIFIED PEER SPECIALIST INTERVENTIONS:    Complete peer assessment with patient to assess their needs and identify their goals to complete while in the recovery program as well as once discharged into the community  Patient will complete WRAP Plan, Crisis Plan and 5 Life Domains  Patient will attend 50% of groups offered on the unit  Patient will complete a goal card weekly      Outcome: Progressing     Problem: Ineffective Coping  Goal: Participates in unit activities  Description  Interventions:  - Provide therapeutic environment   - Provide required programming   - Redirect inappropriate behaviors   Outcome: Progressing  Goal: Patient/Family verbalizes awareness of resources  Outcome: Progressing  Goal: Understands least restrictive measures  Description  Interventions:  - Utilize least restrictive behavior  Outcome: Progressing     Problem: Risk for Self Injury/Neglect  Goal: Treatment Goal: Remain safe during length of stay, learn and adopt new coping skills, and be free of self-injurious ideation, impulses and acts at the time of discharge  Outcome: Progressing  Goal: Verbalize thoughts and feelings  Description  Interventions:  - Assess and re-assess patient's lethality and potential for self-injury  - Engage patient in 1:1 interactions, daily, for a minimum of 15 minutes  - Encourage patient to express feelings, fears, frustrations, hopes  - Establish rapport/trust with patient   Outcome: Progressing  Goal: Refrain from harming self  Description  Interventions:  - Monitor patient closely, per order  - Develop a trusting relationship  - Supervise medication ingestion, monitor effects and side effects   Outcome: Progressing  Goal: Attend and participate in unit activities, including therapeutic, recreational, and educational groups  Description  Interventions:  - Provide therapeutic and educational activities daily, encourage attendance and participation, and document same in the medical record  - Obtain collateral information, encourage visitation and family involvement in care   Outcome: Progressing     Problem: Depression  Goal: Treatment Goal: Demonstrate behavioral control of depressive symptoms, verbalize feelings of improved mood/affect, and adopt new coping skills prior to discharge  Outcome: Progressing  Goal: Verbalize thoughts and feelings  Description  Interventions:  - Assess and re-assess patient's level of risk   - Engage patient in 1:1 interactions, daily, for a minimum of 15 minutes   - Encourage patient to express feelings, fears, frustrations, hopes   Outcome: Progressing     Problem: Anxiety  Goal: Anxiety is at manageable level  Description  Interventions:  - Assess and monitor patient's anxiety level  - Monitor for signs and symptoms of anxiety both physical and emotional (heart palpitations, chest pain, shortness of breath, headaches, nausea, feeling jumpy, restlessness, irritable, apprehensive)  - Collaborate with interdisciplinary team and initiate plan and interventions as ordered  - [place] patient to unit/surroundings  - Explain treatment plan  - Encourage participation in care  - Encourage verbalization of concerns/fears  - Identify coping mechanisms  - Assist in developing anxiety-reducing skills  - Administer/offer alternative therapies  - Limit or eliminate stimulants  Outcome: Progressing     Problem: Alteration in Orientation  Goal: Interact with staff daily  Description  Interventions:  - Assess and re-assess patient's level of orientation  - Engage patient in 1 on 1 interactions, daily, for a minimum of 15 minutes   - Establish rapport/trust with patient   Outcome: Progressing     Problem: PAIN - ADULT  Goal: Verbalizes/displays adequate comfort level or baseline comfort level  Description  Interventions:  - Encourage patient to monitor pain and request assistance  - Assess pain using appropriate pain scale  - Administer analgesics based on type and severity of pain and evaluate response  - Implement non-pharmacological measures as appropriate and evaluate response  - Consider cultural and social influences on pain and pain management  - Notify physician/advanced practitioner if interventions unsuccessful or patient reports new pain  Outcome: Progressing     Problem: SAFETY ADULT  Goal: Patient will remain free of falls  Description  INTERVENTIONS:  - Assess patient frequently for physical needs  -  Identify cognitive and physical deficits and behaviors that affect risk of falls    -  [place] fall precautions as indicated by assessment   - Educate patient/family on patient safety including physical limitations  - Instruct patient to call for assistance with activity based on assessment  - Modify environment to reduce risk of injury  - Consider OT/PT consult to assist with strengthening/mobility  Outcome: Progressing     Problem: RESPIRATORY - ADULT  Goal: Achieves optimal ventilation and oxygenation  Description  INTERVENTIONS:  - Assess for changes in respiratory status  - Assess for changes in mentation and behavior  - Position to facilitate oxygenation and minimize respiratory effort  - Oxygen administration by appropriate delivery method based on oxygen saturation (per order) or ABGs  - Initiate smoking cessation education as indicated  - Encourage broncho-pulmonary hygiene including cough, deep breathe, Incentive Spirometry  - Assess the need for suctioning and aspirate as needed  - Assess and instruct to report SOB or any respiratory difficulty  - Respiratory Therapy support as indicated  Outcome: Progressing     Problem: Nutrition/Hydration-ADULT  Goal: Nutrient/Hydration intake appropriate for improving, restoring or maintaining nutritional needs  Description  Monitor and assess patient's nutrition/hydration status for malnutrition  Collaborate with interdisciplinary team and initiate plan and interventions as ordered  Monitor patient's weight and dietary intake as ordered or per policy  Utilize nutrition screening tool and intervene as necessary  Determine patient's food preferences and provide high-protein, high-caloric foods as appropriate       INTERVENTIONS:  - Monitor oral intake, urinary output, labs, and treatment plans  - Assess nutrition and hydration status and recommend course of action  - Evaluate amount of meals eaten  - Assist patient with eating if necessary   - Allow adequate time for meals  - Recommend/ encourage appropriate diets, oral nutritional supplements, and vitamin/mineral supplements  - Order, calculate, and assess calorie counts as needed  - Recommend, monitor, and adjust tube feedings and TPN/PPN based on assessed needs  - Assess need for intravenous fluids  - Provide specific nutrition/hydration education as appropriate  - Include patient/family/caregiver in decisions related to nutrition  Outcome: Progressing     Problem: Alteration in Thoughts and Perception  Goal: Treatment Goal: Gain control of psychotic behaviors/thinking, reduce/eliminate presenting symptoms and demonstrate improved reality functioning upon discharge  Outcome: Not Progressing  Goal: Recognize dysfunctional thoughts, communicate reality-based thoughts at the time of discharge  Description  Interventions:  - Provide medication and psycho-education to assist patient in compliance and developing insight into his/her illness   Outcome: Not Progressing  Goal: Complete daily ADLs, including personal hygiene independently, as able  Description  Interventions:  - Observe, teach, and assist patient with ADLS  - Monitor and promote a balance of rest/activity, with adequate nutrition and elimination   Outcome: Not Progressing     Problem: Risk for Self Injury/Neglect  Goal: Complete daily ADLs, including personal hygiene independently, as able  Description  Interventions:  - Observe, teach, and assist patient with ADLS  - Monitor and promote a balance of rest/activity, with adequate nutrition and elimination  Outcome: Not Progressing     Problem: Depression  Goal: Refrain from isolation  Description  Interventions:  - Develop a trusting relationship   - Encourage socialization   Outcome: Not Progressing  Goal: Refrain from self-neglect  Outcome: Not Progressing   Mostly isolative to her bed  Came out of her room for meds and meals, and attended [place] group  Minimally interacted with others  Did not shower or do hygiene and looks disheveled  Ate 75% of breakfast and 25% of lunch  EKG was obtained this morning  In the afternoon the patient requested to talk to this writer   "I had an EKG done this morning, I need you to check me out " Asked her what was going on and she said "I've had pain here for a few days (points to right side of chest) Can you check my breathing for wheezing and fluid in my lungs?" Explained that where she is experiencing pain was where her intercostals were located  Upon assessment no abnormal breath sounds were heard and all fields were CTA  Pointed out to the patient that tends to lie on that side and provided reassurance that all findings were WNL  No other behaviors or issues noted  Continue to monitor

## 2020-03-03 NOTE — PLAN OF CARE
Problem: Alteration in Thoughts and Perception  Goal: Treatment Goal: Gain control of psychotic behaviors/thinking, reduce/eliminate presenting symptoms and demonstrate improved reality functioning upon discharge  Outcome: Progressing  Goal: Verbalize thoughts and feelings  Description  Interventions:  - Promote a nonjudgmental and trusting relationship with the patient through active listening and therapeutic communication  - Assess patient's level of functioning, behavior and potential for risk  - Engage patient in 1 on 1 interactions for a minimum of 15 minutes each session  - Encourage patient to express fears, feelings, frustrations, and discuss symptoms    - [place] patient to reality, help patient recognize reality-based thinking   - Administer medications as ordered and assess for potential side effects  - Provide the patient education related to the signs and symptoms of the illness and desired effects of prescribed medications  Outcome: Progressing  Goal: Agree to be compliant with medication regime, as prescribed and report medication side effects  Description  Interventions:  - Offer appropriate PRN medication and supervise ingestion; conduct aims, as needed   Outcome: Progressing  Goal: Attend and participate in unit activities, including therapeutic, recreational, and educational groups  Description  Interventions:  - Provide therapeutic and educational activities daily, encourage attendance and participation, and document same in the medical record     CERTIFIED PEER SPECIALIST INTERVENTIONS:    Complete peer assessment with patient to assess their needs and identify their goals to complete while in the recovery program as well as once discharged into the community  Patient will complete WRAP Plan, Crisis Plan and 5 Life Domains  Patient will attend 50% of groups offered on the unit  Patient will complete a goal card weekly      Outcome: Progressing  Goal: Recognize dysfunctional thoughts, communicate reality-based thoughts at the time of discharge  Description  Interventions:  - Provide medication and psycho-education to assist patient in compliance and developing insight into his/her illness   Outcome: Progressing     Problem: Ineffective Coping  Goal: Participates in unit activities  Description  Interventions:  - Provide therapeutic environment   - Provide required programming   - Redirect inappropriate behaviors   Outcome: Progressing  Goal: Patient/Family verbalizes awareness of resources  Outcome: Progressing  Goal: Understands least restrictive measures  Description  Interventions:  - Utilize least restrictive behavior  Outcome: Progressing     Problem: Risk for Self Injury/Neglect  Goal: Treatment Goal: Remain safe during length of stay, learn and adopt new coping skills, and be free of self-injurious ideation, impulses and acts at the time of discharge  Outcome: Progressing  Goal: Verbalize thoughts and feelings  Description  Interventions:  - Assess and re-assess patient's lethality and potential for self-injury  - Engage patient in 1:1 interactions, daily, for a minimum of 15 minutes  - Encourage patient to express feelings, fears, frustrations, hopes  - Establish rapport/trust with patient   Outcome: Progressing  Goal: Refrain from harming self  Description  Interventions:  - Monitor patient closely, per order  - Develop a trusting relationship  - Supervise medication ingestion, monitor effects and side effects   Outcome: Progressing  Goal: Attend and participate in unit activities, including therapeutic, recreational, and educational groups  Description  Interventions:  - Provide therapeutic and educational activities daily, encourage attendance and participation, and document same in the medical record  - Obtain collateral information, encourage visitation and family involvement in care   Outcome: Progressing     Problem: Depression  Goal: Treatment Goal: Demonstrate behavioral control of depressive symptoms, verbalize feelings of improved mood/affect, and adopt new coping skills prior to discharge  Outcome: Progressing  Goal: Verbalize thoughts and feelings  Description  Interventions:  - Assess and re-assess patient's level of risk   - Engage patient in 1:1 interactions, daily, for a minimum of 15 minutes   - Encourage patient to express feelings, fears, frustrations, hopes   Outcome: Progressing     Problem: Anxiety  Goal: Anxiety is at manageable level  Description  Interventions:  - Assess and monitor patient's anxiety level  - Monitor for signs and symptoms of anxiety both physical and emotional (heart palpitations, chest pain, shortness of breath, headaches, nausea, feeling jumpy, restlessness, irritable, apprehensive)  - Collaborate with interdisciplinary team and initiate plan and interventions as ordered    - [place] patient to unit/surroundings  - Explain treatment plan  - Encourage participation in care  - Encourage verbalization of concerns/fears  - Identify coping mechanisms  - Assist in developing anxiety-reducing skills  - Administer/offer alternative therapies  - Limit or eliminate stimulants  Outcome: Progressing     Problem: Alteration in Orientation  Goal: Interact with staff daily  Description  Interventions:  - Assess and re-assess patient's level of orientation  - Engage patient in 1 on 1 interactions, daily, for a minimum of 15 minutes   - Establish rapport/trust with patient   Outcome: Progressing     Problem: PAIN - ADULT  Goal: Verbalizes/displays adequate comfort level or baseline comfort level  Description  Interventions:  - Encourage patient to monitor pain and request assistance  - Assess pain using appropriate pain scale  - Administer analgesics based on type and severity of pain and evaluate response  - Implement non-pharmacological measures as appropriate and evaluate response  - Consider cultural and social influences on pain and pain management  - Notify physician/advanced practitioner if interventions unsuccessful or patient reports new pain  Outcome: Progressing     Problem: SAFETY ADULT  Goal: Patient will remain free of falls  Description  INTERVENTIONS:  - Assess patient frequently for physical needs  -  Identify cognitive and physical deficits and behaviors that affect risk of falls  -  [place] fall precautions as indicated by assessment   - Educate patient/family on patient safety including physical limitations  - Instruct patient to call for assistance with activity based on assessment  - Modify environment to reduce risk of injury  - Consider OT/PT consult to assist with strengthening/mobility  Outcome: Progressing     Problem: Alteration in Thoughts and Perception  Goal: Complete daily ADLs, including personal hygiene independently, as able  Description  Interventions:  - Observe, teach, and assist patient with ADLS  - Monitor and promote a balance of rest/activity, with adequate nutrition and elimination   Outcome: Not Progressing     Problem: Risk for Self Injury/Neglect  Goal: Complete daily ADLs, including personal hygiene independently, as able  Description  Interventions:  - Observe, teach, and assist patient with ADLS  - Monitor and promote a balance of rest/activity, with adequate nutrition and elimination  Outcome: Not Progressing     Problem: Depression  Goal: Refrain from self-neglect  Outcome: Not Progressing     Problem: Nutrition/Hydration-ADULT  Goal: Nutrient/Hydration intake appropriate for improving, restoring or maintaining nutritional needs  Description  Monitor and assess patient's nutrition/hydration status for malnutrition  Collaborate with interdisciplinary team and initiate plan and interventions as ordered  Monitor patient's weight and dietary intake as ordered or per policy  Utilize nutrition screening tool and intervene as necessary   Determine patient's food preferences and provide high-protein, high-caloric foods as appropriate  INTERVENTIONS:  - Monitor oral intake, urinary output, labs, and treatment plans  - Assess nutrition and hydration status and recommend course of action  - Evaluate amount of meals eaten  - Assist patient with eating if necessary   - Allow adequate time for meals  - Recommend/ encourage appropriate diets, oral nutritional supplements, and vitamin/mineral supplements  - Order, calculate, and assess calorie counts as needed  - Recommend, monitor, and adjust tube feedings and TPN/PPN based on assessed needs  - Assess need for intravenous fluids  - Provide specific nutrition/hydration education as appropriate  - Include patient/family/caregiver in decisions related to nutrition  Outcome: Not Progressing   Mostly isolative to her bed, coming out of her room for meds and meals, attended [place] group  Minimally interacted with others  Did not shower or do hygiene and looks disheveled  Ate 50% of breakfast and 10% of lunch  No other behaviors or issues noted  No somatic complaints voiced  Continue to monitor

## 2020-03-03 NOTE — PROGRESS NOTES
[address] declined PM Group held outside on the basis of there being nothing scenic to see & is irritated when the radio is brought out as dislikes music chosen  Did perform her Incentive Spirometer & achieved consistent volumes of 1250ml  Did have an HS snack, came out for supper medicine except the Singulair  Wearing now her QHS humidified nasal O2 @ 1L for bed

## 2020-03-03 NOTE — PLAN OF CARE
Problem: DISCHARGE PLANNING  Goal: Discharge to home or other facility with appropriate resources  Description  INTERVENTIONS:  - Conduct assessment to determine patient/family and health care team treatment goals, and need for post-acute services based on payer coverage, community resources, and patient preferences, and barriers to discharge  - Address psychosocial, clinical, and financial barriers to discharge as identified in assessment in conjunction with the patient/family and health care team  - Assist the patient in reintegration back into the community by removing barriers which may hinder a successful discharge once deemed stable  - Arrange appropriate level of post-acute services according to patient's needs and preference and payer coverage in collaboration with the physician and health care team  - Communicate with and update the patient/family, physician, and health care team regarding progress on the discharge plan  - Arrange appropriate transportation to post-acute venues    Outcome: Progressing     CM received phone call from Nurys Leon with [address] [place] [address] ([phone]) stating that she will need to have the [phone] [id number] form completed to name Socrates Penn as her rep-payee  Janis faxed the form to this CM to have completed  Nurys Leon also stated that patient can come for a tour/daypass at the [facility] before [facility] has been signed on for services to her  CM arranged for patient to do this tour/daypass on Wednesday, March 11 2020 from 10 AM to 2 PM  CM will continue to follow patient's progress and assist with discharge planning needs

## 2020-03-03 NOTE — PLAN OF CARE
Problem: Alteration in Thoughts and Perception  Goal: Verbalize thoughts and feelings  Description  Interventions:  - Promote a nonjudgmental and trusting relationship with the patient through active listening and therapeutic communication  - Assess patient's level of functioning, behavior and potential for risk  - Engage patient in 1 on 1 interactions for a minimum of 15 minutes each session  - Encourage patient to express fears, feelings, frustrations, and discuss symptoms    - [place] patient to reality, help patient recognize reality-based thinking   - Administer medications as ordered and assess for potential side effects  - Provide the patient education related to the signs and symptoms of the illness and desired effects of prescribed medications  Outcome: Progressing  Goal: Agree to be compliant with medication regime, as prescribed and report medication side effects  Description  Interventions:  - Offer appropriate PRN medication and supervise ingestion; conduct aims, as needed   Outcome: Progressing     Problem: Alteration in Thoughts and Perception  Goal: Treatment Goal: Gain control of psychotic behaviors/thinking, reduce/eliminate presenting symptoms and demonstrate improved reality functioning upon discharge  Outcome: Not Progressing  Goal: Attend and participate in unit activities, including therapeutic, recreational, and educational groups  Description  Interventions:  - Provide therapeutic and educational activities daily, encourage attendance and participation, and document same in the medical record     CERTIFIED PEER SPECIALIST INTERVENTIONS:    Complete peer assessment with patient to assess their needs and identify their goals to complete while in the recovery program as well as once discharged into the community  Patient will complete WRAP Plan, Crisis Plan and 5 Life Domains  Patient will attend 50% of groups offered on the unit  Patient will complete a goal card weekly  Outcome: Not Progressing  Goal: Recognize dysfunctional thoughts, communicate reality-based thoughts at the time of discharge  Description  Interventions:  - Provide medication and psycho-education to assist patient in compliance and developing insight into his/her illness   Outcome: Not Progressing  Goal: Complete daily ADLs, including personal hygiene independently, as able  Description  Interventions:  - Observe, teach, and assist patient with ADLS  - Monitor and promote a balance of rest/activity, with adequate nutrition and elimination   Outcome: Not Progressing    Quiet, subdued, remains in bed, out of bed for meds and meal   Compliant with meds and incentive spirometer (reached 1250 x10)  Denies depression but appears mildly depressed, presents with heavy sighs and forlorn appearance  Ate 25% of supper and had an Ensure  Refuses to attend to hygiene  Able to make needs known  Will continue to monitor progress in recovery program   Addendum:  Slept thru snack  Did not attend group

## 2020-03-03 NOTE — PROGRESS NOTES
03/03/20 0900   Team Meeting   Meeting Type Daily Rounds   Team Members Present   Team Members Present Physician;Nurse;; Other (Discipline and Name)   Physician Team Member Dr Jessica Mancia, RN   Care Management Team Member Brenda Vargas   Other (Discipline and Name) Jarred Schmidt LCSW     Patient attended [facility] groups  No changes in behaviors  Reported some concerns about her O2 at bed time for her discharge  Slept

## 2020-03-03 NOTE — TREATMENT TEAM
03/03/20 1100   Activity/Group Checklist   Group Other (Comment)  (IMR)   Attendance Attended   Attendance Duration (min) 46-60   Interactions Interacted appropriately   Affect/Mood Appropriate   Goals Achieved Identified feelings; Able to engage in interactions; Able to listen to others; Able to reflect/comment on own behavior;Able to manage/cope with feelings       Patient was able to attend group today  Group focused on treatment goals and working on having a positive attitude to facilitate discharge  Patients were able to verbalize ways in which they had changed their attitude during their time on the unit  Patients were encouraged to think about a past accomplishment that can encourage them to continue to make the right steps towards discharge   Patient expressed that she is proud of being able to be a mom  Patient was praised for the great accomplishment that she has made in the past  Patient verbalized that a way that she has been stretching herself beyond her comfort zone is by attending more groups  Patient participated and was respectful of peers

## 2020-03-03 NOTE — PROGRESS NOTES
Pt slept all night w/out any behavioral or respiratory issues   Remains on O2 @ 1L via [place], will continue to monitor

## 2020-03-03 NOTE — ASSESSMENT & PLAN NOTE
Patient continues to do fairly well and EKG which shows repeated yesterday which came back as unremarkable with  which has come down since the previous 1 done a month ago  She has psychosomatic and was asking me to do cardiac workup because of the EKG and I reassured her that the cardiologist had told me that she does not need any workup when checked a month ago  She is still afraid that she may die from choking and that she cannot breathe etc   She tends to lay back on her bed but does wake up freely when approached  Her grooming is not up to par as she has a tendency to take showers 2 times a week with lot of reminders and promptings by staff  She is excited about going for a tour off the [address] [place] [address] were there is supposedly a bed as reported by the [facility] representative  She has E eating fairly well and sleeping well and is now beginning to attend groups and is hoping to be discharged to the [facility] care home  She also knows that she needs to comply with what routine before she is scheduled to go out for another day pass with staff following which she can possibly go out with her sister for another pass and she is anticipating that  She is still suspicious about certain staff tampering with her oxygen concentrator but has not verbalized any delusions about having an implant under her lipoma on    He still tends to talk in a stilted manner as if talking in a court of law    Current medications:    Current Facility-Administered Medications:     acetaminophen (TYLENOL) tablet 325 mg, 325 mg, Oral, Q6H PRN, Roberto Shankar MD    acetaminophen (TYLENOL) tablet 650 mg, 650 mg, Oral, Q6H PRN, Roberto Shankar MD    acetaminophen (TYLENOL) tablet 650 mg, 650 mg, Oral, Q8H PRN, Roberto Shankar MD    albuterol (PROVENTIL HFA,VENTOLIN HFA) inhaler 2 puff, 2 puff, Inhalation, Q4H PRN, Roberto Shankar MD, 2 puff at 10/11/19 0424    aluminum-magnesium hydroxide-simethicone (MYLANTA) 200-200-20 mg/5 mL oral suspension 15 mL, 15 mL, Oral, Q4H PRN, Deep Durand MD, 15 mL at 01/26/20 1021    ammonium lactate (LAC-HYDRIN) 12 % lotion 1 application, 1 application, Topical, BID PRN, Deep Durand MD    benzonatate (TESSALON PERLES) capsule 100 mg, 100 mg, Oral, TID PRN, Deep Durand MD    benztropine (COGENTIN) injection 1 mg, 1 mg, Intramuscular, Q8H PRN, Deep Durand MD    carbamide peroxide (DEBROX) 6 5 % otic solution 5 drop, 5 drop, Left Ear, BID PRN, Ale Henderson MD    cloZAPine (CLOZARIL) tablet 25 mg, 25 mg, Oral, BID, Deep Durand MD, 25 mg at 03/02/20 2143    cloZAPine (CLOZARIL) tablet 50 mg, 50 mg, Oral, BID, Deep Durand MD, 50 mg at 03/02/20 2143    docusate sodium (COLACE) capsule 100 mg, 100 mg, Oral, BID PRN, Deep Durand MD    EPINEPHrine PF (ADRENALIN) 1 mg/mL injection 0 15 mg, 0 15 mg, Intramuscular, Once PRN, Deep Durand MD    fluticasone-vilanterol (BREO ELLIPTA) 200-25 MCG/INH inhaler 1 puff, 1 puff, Inhalation, Daily, Deep Durand MD, 1 puff at 03/02/20 0845    ketotifen (ZADITOR) 0 025 % ophthalmic solution 1 drop, 1 drop, Right Eye, BID PRN, Deep Durand MD    levothyroxine tablet 125 mcg, 125 mcg, Oral, Early Morning, Deep Durand MD, 125 mcg at 03/03/20 0606    magnesium hydroxide (MILK OF MAGNESIA) 400 mg/5 mL oral suspension 30 mL, 30 mL, Oral, Daily PRN, Deep Durand MD    montelukast (SINGULAIR) tablet 10 mg, 10 mg, Oral, HS, Deep Durand MD, 10 mg at 02/22/20 2104    OLANZapine (ZyPREXA) IM injection 5 mg, 5 mg, Intramuscular, Q8H PRN, Deep Durand MD    OLANZapine (ZyPREXA) tablet 5 mg, 5 mg, Oral, Q8H PRN, Deep Durand MD    ondansetron (ZOFRAN-ODT) dispersible tablet 4 mg, 4 mg, Oral, Q6H PRN, Deep Durand MD, 4 mg at 12/09/19 1757    pantoprazole (PROTONIX) EC tablet 40 mg, 40 mg, Oral, Early Morning, Deep Durand MD, 40 mg at 03/03/20 0607    polyethylene glycol (MIRALAX) packet 17 g, 17 g, Oral, Daily PRN, Deep Durand MD    polyvinyl alcohol (LIQUIFILM TEARS) 1 4 % ophthalmic solution 1 drop, 1 drop, Both Eyes, Q3H PRN, Zander Yanez MD    sertraline (ZOLOFT) tablet 175 mg, 175 mg, Oral, Daily, Zander Yanez MD, 175 mg at 03/02/20 0846    sucralfate (CARAFATE) oral suspension 1,000 mg, 1,000 mg, Oral, BID, Tomás Layne MD, 1,000 mg at 03/03/20 0607    theophylline (JEF-24) 24 hr capsule 200 mg, 200 mg, Oral, Daily, Zander Yanez MD, 200 mg at 03/02/20 0846    tiotropium [facility]) capsule for inhaler 18 mcg, 18 mcg, Inhalation, Daily, Zander Yanez MD, 18 mcg at 03/02/20 0845    traZODone (DESYREL) tablet 25 mg, 25 mg, Oral, HS PRN, Zander Yanez MD  Justification if on more than two antipsychotics: not applicable  Side effects if any: none    Risks , benefits, side effects and precautions of medications discussed with patient and reminded patient to let us know any problems with medications     Objective:     Vital Signs:  Vitals:    03/01/20 2015 03/02/20 0700 03/02/20 1500 03/02/20 1900   BP: (!) 80/60 93/68 97/57 (!) 89/58   BP Location: Left arm Right arm Left arm Left arm   Pulse: 94 74 73 67   Resp: 16 18 15 14   Temp: (!) 97 2 °F (36 2 °C) 97 6 °F (36 4 °C) (!) 97 °F (36 1 °C) 97 7 °F (36 5 °C)   TempSrc: Temporal  Temporal Temporal   SpO2: 92% 94% 95% 92%   Weight:       Height:         Appearance:  age appropriate and older than stated age better groomed with better eye contact   Behavior:  deviant, evasive and guarded becoming argumentative with suspicious at times   Speech:  delayed, increased latency of response and tangential    Mood:  anxious, euphoric and irritable    Affect:  increased in intensity, increased in range, mood-congruent and redirectable   Thought Process:  circumstantial, concrete, goal directed, illogical and perserverative with tendency to become still   Thought Content:  delusions  grandiose and persecutory no current suicidal homicidal thoughts or plans  No  preoccupation with violence    Still with some paranoia and accusatory to certain staff tampering with her oxygen concentrator night  No phobias obsessions or compulsions  Perceptual Disturbances: None    Risk Potential: Inability to care for herself and refusal to take showers regularly   Sensorium:  person, place, time/date, situation, day of week, month of year, year and time   Cognition:  grossly intact no language deficit, no deficit in recent or remote memory, aware of current events like the corona virus scare   Consciousness:  alert and awake    Attention: Fair   Intellect: Average   Insight:  Limited   Judgment: fair       Motor Activity: no abnormal movements         Recent Labs:  Results Reviewed     None          I/O Past 24 hours:  No intake/output data recorded  No intake/output data recorded  Assessment / Plan:     Schizoaffective disorder, bipolar type ([facility] [address] )  Overall status:  improving    Reason for continued inpatient care: to assess ability to maintain improvement by attending to ADLs and taking showers regular and see how she does on outing with family after another outing with staff escort next week  Acceptance by patient: accepting now  Hopefulness in recovery: living at the [facility] soon  Understanding of medications :  yes  Involved in reintegration process: attending groups and has off grounds and off unit privileges   Trusting in relatoinship with psychiatrist:  Millicent Alexander now  Overall status :  No changes  Recommended Treatment:    Medication changes:  1) none today    Non-pharmacological treatments  1) Continue with individual therapy, group therapy, milieu therapy and occupational therapy using recovery principles and psycho-education about accepting illness and need for treatment     2) encourage to take showers twice a week and cooperate with treatment and adl skills as per her behav  plan  3) another pass with staff to community today again before pass with sister to see if she would fully comply with assigned outing before pass to community with sister  4) [facility] the [facility] as she already had the intake interview  Safety  1) Safety/communication plan established targeting dynamic risk factors above  Discharge Plan back to a [facility] at [address] / Coordination of Care    Total floor / unit time spent today 15 minutes  Greater than 50% of total time was spent with the patient and / or family counseling and / or coordination of care  Receptive to listening and learning coping skills for management of symptoms and attending to ADLs  Patient's Rights, confidentiality and exceptions to confidentiality, use of automated medical record, [facility] staff access to medical record, and consent to treatment reviewed      Prakash Brewster MD

## 2020-03-04 NOTE — ASSESSMENT & PLAN NOTE
Patient is supposed to go for a tour of the [facility] next week which he is being arranged by the   She is pleased to hear about it  She has been attending IMR groups and various other groups and knows that she needs to attend groups to be able to keep up with the privileges she has  She is eating well does not always accept all the meals  She is sleeping well  She continues to have some paranoia as usual about people messing with her inhalant as well as oxygen concentrator but has not voiced any belief that there is a micro chip on her hand underneath the lipoma  She is compliant with the medications  No side effects reported  She is still psychosomatic and believes that she is going to die from choking or from the not able to breathe or from swallowing difficulties      Current medications:    Current Facility-Administered Medications:     acetaminophen (TYLENOL) tablet 325 mg, 325 mg, Oral, Q6H PRN, Allie Guzman MD    acetaminophen (TYLENOL) tablet 650 mg, 650 mg, Oral, Q6H PRN, Allie Guzman MD    acetaminophen (TYLENOL) tablet 650 mg, 650 mg, Oral, Q8H PRN, Allie Guzman MD    albuterol (PROVENTIL HFA,VENTOLIN HFA) inhaler 2 puff, 2 puff, Inhalation, Q4H PRN, Allie Guzman MD, 2 puff at 10/11/19 0424    aluminum-magnesium hydroxide-simethicone (MYLANTA) 200-200-20 mg/5 mL oral suspension 15 mL, 15 mL, Oral, Q4H PRN, Allie Guzman MD, 15 mL at 01/26/20 1021    ammonium lactate (LAC-HYDRIN) 12 % lotion 1 application, 1 application, Topical, BID PRN, Allie Guzman MD    benzonatate (TESSALON PERLES) capsule 100 mg, 100 mg, Oral, TID PRN, Allie Guzman MD    benztropine (COGENTIN) injection 1 mg, 1 mg, Intramuscular, Q8H PRN, Allie Guzman MD    carbamide peroxide (DEBROX) 6 5 % otic solution 5 drop, 5 drop, Left Ear, BID PRN, Jerica Victoria MD    cloZAPine (CLOZARIL) tablet 25 mg, 25 mg, Oral, BID, Allie Guzman MD, 25 mg at 03/03/20 2124    cloZAPine (CLOZARIL) tablet 50 mg, 50 mg, Oral, BID, Ervin Little MD, 50 mg at 03/03/20 2125    docusate sodium (COLACE) capsule 100 mg, 100 mg, Oral, BID PRN, Ervin Little MD    EPINEPHrine PF (ADRENALIN) 1 mg/mL injection 0 15 mg, 0 15 mg, Intramuscular, Once PRN, Ervin Little MD    fluticasone-vilanterol (BREO ELLIPTA) 200-25 MCG/INH inhaler 1 puff, 1 puff, Inhalation, Daily, Ervin Little MD, 1 puff at 03/04/20 0842    ketotifen (ZADITOR) 0 025 % ophthalmic solution 1 drop, 1 drop, Right Eye, BID PRN, Ervin Little MD    levothyroxine tablet 125 mcg, 125 mcg, Oral, Early Morning, Ervin Little MD, 125 mcg at 03/04/20 0602    magnesium hydroxide (MILK OF MAGNESIA) 400 mg/5 mL oral suspension 30 mL, 30 mL, Oral, Daily PRN, Ervin Little MD    montelukast (SINGULAIR) tablet 10 mg, 10 mg, Oral, HS, Ervin Little MD, 10 mg at 02/22/20 2104    OLANZapine (ZyPREXA) IM injection 5 mg, 5 mg, Intramuscular, Q8H PRN, Ervin Little MD    OLANZapine (ZyPREXA) tablet 5 mg, 5 mg, Oral, Q8H PRN, Ervin Little MD    ondansetron (ZOFRAN-ODT) dispersible tablet 4 mg, 4 mg, Oral, Q6H PRN, Ervin Little MD, 4 mg at 12/09/19 1757    pantoprazole (PROTONIX) EC tablet 40 mg, 40 mg, Oral, Early Morning, Ervin Little MD, 40 mg at 03/04/20 0602    polyethylene glycol (MIRALAX) packet 17 g, 17 g, Oral, Daily PRN, Ervin Little MD    polyvinyl alcohol (LIQUIFILM TEARS) 1 4 % ophthalmic solution 1 drop, 1 drop, Both Eyes, Q3H PRN, Ervin Little MD    sertraline (ZOLOFT) tablet 175 mg, 175 mg, Oral, Daily, Ervin Little MD, 175 mg at 03/04/20 7702    sucralfate (CARAFATE) oral suspension 1,000 mg, 1,000 mg, Oral, BID, Cat Torres MD, 1,000 mg at 03/04/20 0602    theophylline (JEF-24) 24 hr capsule 200 mg, 200 mg, Oral, Daily, Ervin Little MD, 200 mg at 03/04/20 0842    tiotropium (SPIRIVA) capsule for inhaler 18 mcg, 18 mcg, Inhalation, Daily, Ervin Little MD, 18 mcg at 03/04/20 0841    traZODone (DESYREL) tablet 25 mg, 25 mg, Oral, HS PRN, Ervin Little, MD  Justification if on more than two antipsychotics: not applicable  Side effects if any: none    Risks , benefits, side effects and precautions of medications discussed with patient and reminded patient to let us know any problems with medications     Objective:     Vital Signs:  Vitals:    03/03/20 2124 03/04/20 0556 03/04/20 0730 03/04/20 0732   BP:   113/66 104/57   BP Location:   Left arm Left arm   Pulse: 85  85 72   Resp:   18    Temp:   98 6 °F (37 °C)    TempSrc:   Temporal    SpO2: 100% 92%     Weight:       Height:         Appearance:  age appropriate, casually dressed, disheveled and older than stated age with better eye contact   Behavior:  deviant, evasive and guarded becoming suspicious and argumentative at times   Speech:  delayed, increased latency of response and tangential    Mood:  anxious, euphoric and irritable    Affect:  increased in intensity, increased in range, mood-congruent and redirectable   Thought Process:  circumstantial, concrete, goal directed, illogical and perserverative with tendency to become stilted   Thought Content:  delusions  grandiose and persecutory no current suicidal homicidal thoughts intent or plans  No preoccupation with violence  No phobias obsessions or compulsions  Still does appear to be somewhat suspicious accusing some staff of tampering with the inhalant and the spiral meter and still psychosomatic about fear of dying from talking or from not breathing etc   Perceptual Disturbances: None    Risk Potential: Inability to care for herself and refusal to take showers regularly   Sensorium:  person, place, time/date, situation, day of week, month of year, year and time   Cognition:  grossly intact no language deficit, no deficit in recent or remote memory, aware of current events     Consciousness:  alert and awake    Attention: Fair   Intellect: Average   Insight:  Limited   Judgment: fair       Motor Activity: no abnormal movements         Recent Labs:  Results Reviewed     None          I/O Past 24 hours:  No intake/output data recorded  No intake/output data recorded  Assessment / Plan:     Schizoaffective disorder, bipolar type ([address] )  Overall status:  improving    Reason for continued inpatient care: to assess ability to maintain improvement by attending to ADLs and taking showers regular and see how she does on outing with family after another outing with staff escort next week  Acceptance by patient: accepting now  Hopefulness in recovery: living at the [facility] soon  Understanding of medications :  yes  Involved in reintegration process: attending groups and has off grounds and off unit privileges   Trusting in relatoinship with psychiatrist:  Marta Villatoro now  Overall status :  No changes  Recommended Treatment:    Medication changes:  1) none today    Non-pharmacological treatments  1) Continue with individual therapy, group therapy, milieu therapy and occupational therapy using recovery principles and psycho-education about accepting illness and need for treatment   2) encourage to take showers twice a week and cooperate with treatment and adl skills as per her behav  plan  3) another pass with staff to community today again before pass with sister to see if she would fully comply with assigned outing before pass to community with sister  4) Prepare for the [address] scheduled for next Wednesday as she already had the intake interview  Safety  1) Safety/communication plan established targeting dynamic risk factors above  Discharge Plan back to a [facility] at [address] / Coordination of Care    Total floor / unit time spent today 15 minutes  Greater than 50% of total time was spent with the patient and / or family counseling and / or coordination of care  Receptive to listening and learning coping skills for management of symptoms and attending to ADLs       Patient's Rights, confidentiality and exceptions to confidentiality, use of automated medical record, [address] [place] [address] staff access to medical record, and consent to treatment reviewed      Ivonne Nevarez MD

## 2020-03-04 NOTE — TREATMENT TEAM
03/04/20 1100   Activity/Group Checklist   Group   (IMR/[employer] [facility] Well[facility] )   Attendance Attended   Attendance Duration (min) 46-60   Interactions Interacted appropriately   Affect/Mood Appropriate;Normal range   Goals Achieved Identified feelings; Able to listen to others; Able to engage in interactions; Able to self-disclose

## 2020-03-04 NOTE — TREATMENT TEAM
Patient declined      03/03/20 1500   Activity/Group Checklist   Group   (Recovery Workshop )   Attendance Did not attend   Attendance Duration (min) 46-60   Affect/Mood IRMA

## 2020-03-04 NOTE — ASSESSMENT & PLAN NOTE
Patient is supposed to go for a tour of the [place] [facility] next week which he is being arranged by the   She is pleased to hear about it  She has been attending IMR groups and various other groups and knows that she needs to attend groups to be able to keep up with the privileges she has  She is eating well does not always accept all the meals  She is sleeping well  She continues to have some paranoia as usual about people messing with her inhalant as well as oxygen concentrator but has not voiced any belief that there is a micro chip on her hand underneath the lipoma  She is compliant with the medications  No side effects reported  She is still psychosomatic and believes that she is going to die from choking or from the not able to breathe or from swallowing difficulties      Current medications:    Current Facility-Administered Medications:     acetaminophen (TYLENOL) tablet 325 mg, 325 mg, Oral, Q6H PRN, Alirio Frazier MD    acetaminophen (TYLENOL) tablet 650 mg, 650 mg, Oral, Q6H PRN, Alirio Frazier MD    acetaminophen (TYLENOL) tablet 650 mg, 650 mg, Oral, Q8H PRN, Alirio Frazier MD    albuterol (PROVENTIL HFA,VENTOLIN HFA) inhaler 2 puff, 2 puff, Inhalation, Q4H PRN, Alirio Frazier MD, 2 puff at 10/11/19 0424    aluminum-magnesium hydroxide-simethicone (MYLANTA) 200-200-20 mg/5 mL oral suspension 15 mL, 15 mL, Oral, Q4H PRN, Alirio Frazier MD, 15 mL at 01/26/20 1021    ammonium lactate (LAC-HYDRIN) 12 % lotion 1 application, 1 application, Topical, BID PRN, Alirio Frazier MD    benzonatate (TESSALON PERLES) capsule 100 mg, 100 mg, Oral, TID PRN, Alirio Frazier MD    benztropine (COGENTIN) injection 1 mg, 1 mg, Intramuscular, Q8H PRN, Alirio Frazier MD    carbamide peroxide (DEBROX) 6 5 % otic solution 5 drop, 5 drop, Left Ear, BID PRN, Remo Nyhan, MD    cloZAPine (CLOZARIL) tablet 25 mg, 25 mg, Oral, BID, Alirio Frazier MD, 25 mg at 03/03/20 2124    cloZAPine (CLOZARIL) tablet 50 mg, 50 mg, Oral, BID, Carissa Willis MD, 50 mg at 03/03/20 2125    docusate sodium (COLACE) capsule 100 mg, 100 mg, Oral, BID PRN, Carissa Willis MD    EPINEPHrine PF (ADRENALIN) 1 mg/mL injection 0 15 mg, 0 15 mg, Intramuscular, Once PRN, Carissa Willis MD    fluticasone-vilanterol (BREO ELLIPTA) 200-25 MCG/INH inhaler 1 puff, 1 puff, Inhalation, Daily, Carissa Willis MD, 1 puff at 03/03/20 0835    ketotifen (ZADITOR) 0 025 % ophthalmic solution 1 drop, 1 drop, Right Eye, BID PRN, Carissa Willis MD    levothyroxine tablet 125 mcg, 125 mcg, Oral, Early Morning, Cairssa Willis MD, 125 mcg at 03/04/20 0602    magnesium hydroxide (MILK OF MAGNESIA) 400 mg/5 mL oral suspension 30 mL, 30 mL, Oral, Daily PRN, Carissa Willis MD    montelukast (SINGULAIR) tablet 10 mg, 10 mg, Oral, HS, Carissa Willis MD, 10 mg at 02/22/20 2104    OLANZapine (ZyPREXA) IM injection 5 mg, 5 mg, Intramuscular, Q8H PRN, Carissa Willis MD    OLANZapine (ZyPREXA) tablet 5 mg, 5 mg, Oral, Q8H PRN, Carissa Willis MD    ondansetron (ZOFRAN-ODT) dispersible tablet 4 mg, 4 mg, Oral, Q6H PRN, Carissa Willis MD, 4 mg at 12/09/19 1757    pantoprazole (PROTONIX) EC tablet 40 mg, 40 mg, Oral, Early Morning, Carissa Willis MD, 40 mg at 03/04/20 0602    polyethylene glycol (MIRALAX) packet 17 g, 17 g, Oral, Daily PRN, Carissa Willis MD    polyvinyl alcohol (LIQUIFILM TEARS) 1 4 % ophthalmic solution 1 drop, 1 drop, Both Eyes, Q3H PRN, Carissa Willis MD    sertraline (ZOLOFT) tablet 175 mg, 175 mg, Oral, Daily, Carissa Willis MD, 175 mg at 03/03/20 0835    sucralfate (CARAFATE) oral suspension 1,000 mg, 1,000 mg, Oral, BID, Cat Torres MD, 1,000 mg at 03/04/20 0602    theophylline (JEF-24) 24 hr capsule 200 mg, 200 mg, Oral, Daily, Carissa Willis MD, 200 mg at 03/03/20 0835    tiotropium [facility]) capsule for inhaler 18 mcg, 18 mcg, Inhalation, Daily, Carissa Willis MD, 18 mcg at 03/03/20 0836    traZODone (DESYREL) tablet 25 mg, 25 mg, Oral, HS PRN, Carissa Willis, MD  Justification if on more than two antipsychotics: not applicable  Side effects if any: none    Risks , benefits, side effects and precautions of medications discussed with patient and reminded patient to let us know any problems with medications     Objective:     Vital Signs:  Vitals:    03/03/20 1633 03/03/20 2006 03/03/20 2124 03/04/20 0556   BP: 108/65 105/57     BP Location: Left arm Left arm     Pulse: 81 74 85    Resp: 18 18     Temp: (!) 97 2 °F (36 2 °C) 98 7 °F (37 1 °C)     TempSrc: Temporal Temporal     SpO2: 94% 95% 100% 92%   Weight:       Height:         Appearance:  age appropriate, casually dressed, disheveled and older than stated age with better eye contact   Behavior:  deviant, evasive and guarded becoming suspicious and argumentative at times   Speech:  delayed, increased latency of response and tangential    Mood:  anxious, euphoric and irritable    Affect:  increased in intensity, increased in range, mood-congruent and redirectable   Thought Process:  circumstantial, concrete, goal directed, illogical and perserverative with tendency to become stilted   Thought Content:  delusions  grandiose and persecutory no current suicidal homicidal thoughts intent or plans  No preoccupation with violence  No phobias obsessions or compulsions  Still does appear to be somewhat suspicious accusing some staff of tampering with the inhalant and the spiral meter and still psychosomatic about fear of dying from talking or from not breathing etc   Perceptual Disturbances: None    Risk Potential: Inability to care for herself and refusal to take showers regularly   Sensorium:  person, place, time/date, situation, day of week, month of year, year and time   Cognition:  grossly intact no language deficit, no deficit in recent or remote memory, aware of current events     Consciousness:  alert and awake    Attention: Fair   Intellect: Average   Insight:  Limited   Judgment: fair       Motor Activity: no abnormal movements         Recent Labs:  Results Reviewed     None          I/O Past 24 hours:  No intake/output data recorded  No intake/output data recorded  Assessment / Plan:     Schizoaffective disorder, bipolar type ([address] )  Overall status:  improving    Reason for continued inpatient care: to assess ability to maintain improvement by attending to ADLs and taking showers regular and see how she does on outing with family after another outing with staff escort next week  Acceptance by patient: accepting now  Hopefulness in recovery: living at the [facility] soon  Understanding of medications :  yes  Involved in reintegration process: attending groups and has off grounds and off unit privileges   Trusting in relatoinship with psychiatrist:  Nohemy Sandoval now  Overall status :  No changes  Recommended Treatment:    Medication changes:  1) none today    Non-pharmacological treatments  1) Continue with individual therapy, group therapy, milieu therapy and occupational therapy using recovery principles and psycho-education about accepting illness and need for treatment   2) encourage to take showers twice a week and cooperate with treatment and adl skills as per her behav  plan  3) another pass with staff to community today again before pass with sister to see if she would fully comply with assigned outing before pass to community with sister  4) Prepare for the [address] scheduled for next Wednesday as she already had the intake interview  Safety  1) Safety/communication plan established targeting dynamic risk factors above  Discharge Plan back to a [facility] at [address] / Coordination of Care    Total floor / unit time spent today 15 minutes  Greater than 50% of total time was spent with the patient and / or family counseling and / or coordination of care  Receptive to listening and learning coping skills for management of symptoms and attending to ADLs       Patient's Rights, confidentiality and exceptions to confidentiality, use of automated medical record, [address] staff access to medical record, and consent to treatment reviewed      Tree Madden MD

## 2020-03-04 NOTE — PLAN OF CARE
Problem: Alteration in Thoughts and Perception  Goal: Attend and participate in unit activities, including therapeutic, recreational, and educational groups  Description  Interventions:  - Provide therapeutic and educational activities daily, encourage attendance and participation, and document same in the medical record     CERTIFIED PEER SPECIALIST INTERVENTIONS:    Complete peer assessment with patient to assess their needs and identify their goals to complete while in the recovery program as well as once discharged into the community  Patient will complete WRAP Plan, Crisis Plan and 5 Life Domains  Patient will attend 50% of groups offered on the unit  Patient will complete a goal card weekly  Outcome: Not Progressing  Patient shows decline in Group attendance and is currently at 47%  Patient does not keep up with hygiene and when discussed with Patient she is know to become very defiant and confrontational  Patient has shown very little desire to change and often blames others or redirects focus off of herself  Patient has been given WRAP Plan and Life Domain goals however, refuses and makes excuses for not completing them  Patient continues to do the minimal to get by in the [facility] recovery program and it has been explained to her that group attendance and hygiene are important factors to encompassing wellness  Writer offered extra group and staff assistance to complete WRAP plan's, Life Domains and BH Relapse Prevention however, Patient declined to attend

## 2020-03-04 NOTE — TREATMENT TEAM
03/04/20 1400   Activity/Group Checklist   Group   (Recovery Anonymous )   Attendance Did not attend   Attendance Duration (min) 46-60   Affect/Mood IRMA

## 2020-03-04 NOTE — PROGRESS NOTES
Progress Note - Gigi Jenkins 1955, 60 y o  female MRN: [id number]    Unit/Bed#: [facility] 110-02 Encounter: [id number]    Primary Care Provider: Bonnee Favre, PA-C   Date and time admitted to hospital: 7/23/2019  5:30 PM        * Schizoaffective disorder, bipolar type [facility])  Assessment & Plan  Patient is supposed to go for a tour of the [place] [facility] home next week which he is being arranged by the   She is pleased to hear about it  She has been attending [facility] groups and various other groups and knows that she needs to attend groups to be able to keep up with the privileges she has  She is eating well does not always accept all the meals  She is sleeping well  She continues to have some paranoia as usual about people messing with her inhalant as well as oxygen concentrator but has not voiced any belief that there is a micro chip on her hand underneath the lipoma  She is compliant with the medications  No side effects reported  She is still psychosomatic and believes that she is going to die from choking or from the not able to breathe or from swallowing difficulties      Current medications:    Current Facility-Administered Medications:     acetaminophen (TYLENOL) tablet 325 mg, 325 mg, Oral, Q6H PRN, Faheem Daniels MD    acetaminophen (TYLENOL) tablet 650 mg, 650 mg, Oral, Q6H PRN, Faheem Daniels MD    acetaminophen (TYLENOL) tablet 650 mg, 650 mg, Oral, Q8H PRN, Faheem Daniels MD    albuterol (PROVENTIL HFA,VENTOLIN HFA) inhaler 2 puff, 2 puff, Inhalation, Q4H PRN, Faheem Daniels MD, 2 puff at 10/11/19 0424    aluminum-magnesium hydroxide-simethicone (MYLANTA) 200-200-20 mg/5 mL oral suspension 15 mL, 15 mL, Oral, Q4H PRN, Faheem Daniels MD, 15 mL at 01/26/20 1021    ammonium lactate (LAC-HYDRIN) 12 % lotion 1 application, 1 application, Topical, BID PRN, Faheem Daniels MD    benzonatate (TESSALON PERLES) capsule 100 mg, 100 mg, Oral, TID PRN, Faheem Daniels MD    benztropine (COGENTIN) injection 1 mg, 1 mg, Intramuscular, Q8H PRN, Shi Pham MD    carbamide peroxide (DEBROX) 6 5 % otic solution 5 drop, 5 drop, Left Ear, BID PRN, Christ Kc MD    cloZAPine (CLOZARIL) tablet 25 mg, 25 mg, Oral, BID, Shi Pham MD, 25 mg at 03/03/20 2124    cloZAPine (CLOZARIL) tablet 50 mg, 50 mg, Oral, BID, Shi Pham MD, 50 mg at 03/03/20 2125    docusate sodium (COLACE) capsule 100 mg, 100 mg, Oral, BID PRN, Shi Pham MD    EPINEPHrine PF (ADRENALIN) 1 mg/mL injection 0 15 mg, 0 15 mg, Intramuscular, Once PRN, Shi Pham MD    fluticasone-vilanterol (BREO ELLIPTA) 200-25 MCG/INH inhaler 1 puff, 1 puff, Inhalation, Daily, Shi Pham MD, 1 puff at 03/04/20 0842    ketotifen (ZADITOR) 0 025 % ophthalmic solution 1 drop, 1 drop, Right Eye, BID PRN, Shi Pham MD    levothyroxine tablet 125 mcg, 125 mcg, Oral, Early Morning, Shi Pham MD, 125 mcg at 03/04/20 0602    magnesium hydroxide (MILK OF MAGNESIA) 400 mg/5 mL oral suspension 30 mL, 30 mL, Oral, Daily PRN, Shi Pham MD    montelukast (SINGULAIR) tablet 10 mg, 10 mg, Oral, HS, Shi Pham MD, 10 mg at 02/22/20 2104    OLANZapine (ZyPREXA) IM injection 5 mg, 5 mg, Intramuscular, Q8H PRN, Shi Pham MD    OLANZapine (ZyPREXA) tablet 5 mg, 5 mg, Oral, Q8H PRN, Shi Pham MD    ondansetron (ZOFRAN-ODT) dispersible tablet 4 mg, 4 mg, Oral, Q6H PRN, Shi Pham MD, 4 mg at 12/09/19 1757    pantoprazole (PROTONIX) EC tablet 40 mg, 40 mg, Oral, Early Morning, Shi Pham MD, 40 mg at 03/04/20 0602    polyethylene glycol (MIRALAX) packet 17 g, 17 g, Oral, Daily PRN, Shi Pham MD    polyvinyl alcohol (LIQUIFILM TEARS) 1 4 % ophthalmic solution 1 drop, 1 drop, Both Eyes, Q3H PRN, Shi Pham MD    sertraline (ZOLOFT) tablet 175 mg, 175 mg, Oral, Daily, Shi Pham MD, 175 mg at 03/04/20 0842    sucralfate (CARAFATE) oral suspension 1,000 mg, 1,000 mg, Oral, BID, Cat Torres MD, 1,000 mg at 03/04/20 0602    theophylline (JEF-24) 24 hr capsule 200 mg, 200 mg, Oral, Daily, Allie Guzman MD, 200 mg at 03/04/20 0842    tiotropium [facility]) capsule for inhaler 18 mcg, 18 mcg, Inhalation, Daily, Allie Guzman MD, 18 mcg at 03/04/20 0841    traZODone (DESYREL) tablet 25 mg, 25 mg, Oral, HS PRN, Allie Guzman MD  Justification if on more than two antipsychotics: not applicable  Side effects if any: none    Risks , benefits, side effects and precautions of medications discussed with patient and reminded patient to let us know any problems with medications     Objective:     Vital Signs:  Vitals:    03/03/20 2124 03/04/20 0556 03/04/20 0730 03/04/20 0732   BP:   113/66 104/57   BP Location:   Left arm Left arm   Pulse: 85  85 72   Resp:   18    Temp:   98 6 °F (37 °C)    TempSrc:   Temporal    SpO2: 100% 92%     Weight:       Height:         Appearance:  age appropriate, casually dressed, disheveled and older than stated age with better eye contact   Behavior:  deviant, evasive and guarded becoming suspicious and argumentative at times   Speech:  delayed, increased latency of response and tangential    Mood:  anxious, euphoric and irritable    Affect:  increased in intensity, increased in range, mood-congruent and redirectable   Thought Process:  circumstantial, concrete, goal directed, illogical and perserverative with tendency to become stilted   Thought Content:  delusions  grandiose and persecutory no current suicidal homicidal thoughts intent or plans  No preoccupation with violence  No phobias obsessions or compulsions    Still does appear to be somewhat suspicious accusing some staff of tampering with the inhalant and the spiral meter and still psychosomatic about fear of dying from talking or from not breathing etc   Perceptual Disturbances: None    Risk Potential: Inability to care for herself and refusal to take showers regularly   Sensorium:  person, place, time/date, situation, day of week, month of year, year and time   Cognition:  grossly intact no language deficit, no deficit in recent or remote memory, aware of current events  Consciousness:  alert and awake    Attention: Fair   Intellect: Average   Insight:  Limited   Judgment: fair       Motor Activity: no abnormal movements         Recent Labs:  Results Reviewed     None          I/O Past 24 hours:  No intake/output data recorded  No intake/output data recorded  Assessment / Plan:     Schizoaffective disorder, bipolar type ([address] )  Overall status:  improving    Reason for continued inpatient care: to assess ability to maintain improvement by attending to ADLs and taking showers regular and see how she does on outing with family after another outing with staff escort next week  Acceptance by patient: accepting now  Hopefulness in recovery: living at the [facility]  Understanding of medications :  yes  Involved in reintegration process: attending groups and has off grounds and off unit privileges   Trusting in relatoinship with psychiatrist:  Nino Menendez now  Overall status :  No changes  Recommended Treatment:    Medication changes:  1) none today    Non-pharmacological treatments  1) Continue with individual therapy, group therapy, milieu therapy and occupational therapy using recovery principles and psycho-education about accepting illness and need for treatment   2) encourage to take showers twice a week and cooperate with treatment and adl skills as per her behav  plan  3) another pass with staff to community today again before pass with sister to see if she would fully comply with assigned outing before pass to community with sister  4) Prepare for the [address] scheduled for next Wednesday as she already had the intake interview  Safety  1) Safety/communication plan established targeting dynamic risk factors above    Discharge Plan back to a [facility] at [address]  / Coordination of Care    Total floor / unit time spent today 15 minutes  Greater than 50% of total time was spent with the patient and / or family counseling and / or coordination of care  Receptive to listening and learning coping skills for management of symptoms and attending to ADLs  Patient's Rights, confidentiality and exceptions to confidentiality, use of automated medical record, [address] staff access to medical record, and consent to treatment reviewed      Prakash Brewster MD

## 2020-03-04 NOTE — TREATMENT TEAM
Not scheduled to attend      03/03/20 1430   Activity/Group Checklist   Group   (Community Programming Wrap up )   Attendance Did not attend   Attendance Duration (min) 16-30   Affect/Mood IRMA

## 2020-03-04 NOTE — PROGRESS NOTES
Progress Note - Drew  1955, 60 y o  female MRN: [id number]    [facility] Encounter: [id number]    Primary Care Provider: Karen Holm PA-C   Date and time admitted to hospital: 7/23/2019  5:30 PM        * Schizoaffective disorder, bipolar type [facility])  Assessment & Plan  Patient is supposed to go for a tour of the [facility] care home next week which he is being arranged by the   She is pleased to hear about it  She has been attending IMR groups and various other groups and knows that she needs to attend groups to be able to keep up with the privileges she has  She is eating well does not always accept all the meals  She is sleeping well  She continues to have some paranoia as usual about people messing with her inhalant as well as oxygen concentrator but has not voiced any belief that there is a micro chip on her hand underneath the lipoma  She is compliant with the medications  No side effects reported  She is still psychosomatic and believes that she is going to die from choking or from the not able to breathe or from swallowing difficulties      Current medications:    Current Facility-Administered Medications:     acetaminophen (TYLENOL) tablet 325 mg, 325 mg, Oral, Q6H PRN, Teri Huggins MD    acetaminophen (TYLENOL) tablet 650 mg, 650 mg, Oral, Q6H PRN, Teri Huggins MD    acetaminophen (TYLENOL) tablet 650 mg, 650 mg, Oral, Q8H PRN, Teri Huggins MD    albuterol (PROVENTIL HFA,VENTOLIN HFA) inhaler 2 puff, 2 puff, Inhalation, Q4H PRN, Teri Huggins MD, 2 puff at 10/11/19 0424    aluminum-magnesium hydroxide-simethicone (MYLANTA) 200-200-20 mg/5 mL oral suspension 15 mL, 15 mL, Oral, Q4H PRN, Teri Huggins MD, 15 mL at 01/26/20 1021    ammonium lactate (LAC-HYDRIN) 12 % lotion 1 application, 1 application, Topical, BID PRN, Teri Huggins MD    benzonatate (TESSALON PERLES) capsule 100 mg, 100 mg, Oral, TID PRN, Teri Huggins MD    benztropine (COGENTIN) injection 1 mg, 1 mg, Intramuscular, Q8H PRN, Faheem Daniels MD    carbamide peroxide (DEBROX) 6 5 % otic solution 5 drop, 5 drop, Left Ear, BID PRN, Anna Arana MD    cloZAPine (CLOZARIL) tablet 25 mg, 25 mg, Oral, BID, Faheem Daniels MD, 25 mg at 03/03/20 2124    cloZAPine (CLOZARIL) tablet 50 mg, 50 mg, Oral, BID, Faheem Daniels MD, 50 mg at 03/03/20 2125    docusate sodium (COLACE) capsule 100 mg, 100 mg, Oral, BID PRN, Faheem Daniels MD    EPINEPHrine PF (ADRENALIN) 1 mg/mL injection 0 15 mg, 0 15 mg, Intramuscular, Once PRN, Faheem Daniels MD    fluticasone-vilanterol (BREO ELLIPTA) 200-25 MCG/INH inhaler 1 puff, 1 puff, Inhalation, Daily, Faheem Daniels MD, 1 puff at 03/03/20 0835    ketotifen (ZADITOR) 0 025 % ophthalmic solution 1 drop, 1 drop, Right Eye, BID PRN, Faheem Daniels MD    levothyroxine tablet 125 mcg, 125 mcg, Oral, Early Morning, Faheem Daniels MD, 125 mcg at 03/04/20 0602    magnesium hydroxide (MILK OF MAGNESIA) 400 mg/5 mL oral suspension 30 mL, 30 mL, Oral, Daily PRN, Faheem Daniels MD    montelukast (SINGULAIR) tablet 10 mg, 10 mg, Oral, HS, Faheem Daniels MD, 10 mg at 02/22/20 2104    OLANZapine (ZyPREXA) IM injection 5 mg, 5 mg, Intramuscular, Q8H PRN, Faheem Daniels MD    OLANZapine (ZyPREXA) tablet 5 mg, 5 mg, Oral, Q8H PRN, Faheem Daniels MD    ondansetron (ZOFRAN-ODT) dispersible tablet 4 mg, 4 mg, Oral, Q6H PRN, Faheem Daniels MD, 4 mg at 12/09/19 1757    pantoprazole (PROTONIX) EC tablet 40 mg, 40 mg, Oral, Early Morning, Faheem Daniels MD, 40 mg at 03/04/20 0602    polyethylene glycol (MIRALAX) packet 17 g, 17 g, Oral, Daily PRN, Faheem Daniels MD    polyvinyl alcohol (LIQUIFILM TEARS) 1 4 % ophthalmic solution 1 drop, 1 drop, Both Eyes, Q3H PRN, Faheem Daniels MD    sertraline (ZOLOFT) tablet 175 mg, 175 mg, Oral, Daily, Faheem Daniels MD, 175 mg at 03/03/20 0835    sucralfate (CARAFATE) oral suspension 1,000 mg, 1,000 mg, Oral, BID, Cat Torres MD, 1,000 mg at 03/04/20 0602    theophylline (JEF-24) 24 hr capsule 200 mg, 200 mg, Oral, Daily, Jerome Lopez MD, 200 mg at 03/03/20 0835    tiotropium [facility]) capsule for inhaler 18 mcg, 18 mcg, Inhalation, Daily, Jerome Lopez MD, 18 mcg at 03/03/20 0836    traZODone (DESYREL) tablet 25 mg, 25 mg, Oral, HS PRN, Jerome Lopez MD  Justification if on more than two antipsychotics: not applicable  Side effects if any: none    Risks , benefits, side effects and precautions of medications discussed with patient and reminded patient to let us know any problems with medications     Objective:     Vital Signs:  Vitals:    03/03/20 1633 03/03/20 2006 03/03/20 2124 03/04/20 0556   BP: 108/65 105/57     BP Location: Left arm Left arm     Pulse: 81 74 85    Resp: 18 18     Temp: (!) 97 2 °F (36 2 °C) 98 7 °F (37 1 °C)     TempSrc: Temporal Temporal     SpO2: 94% 95% 100% 92%   Weight:       Height:         Appearance:  age appropriate, casually dressed, disheveled and older than stated age with better eye contact   Behavior:  deviant, evasive and guarded becoming suspicious and argumentative at times   Speech:  delayed, increased latency of response and tangential    Mood:  anxious, euphoric and irritable    Affect:  increased in intensity, increased in range, mood-congruent and redirectable   Thought Process:  circumstantial, concrete, goal directed, illogical and perserverative with tendency to become stilted   Thought Content:  delusions  grandiose and persecutory no current suicidal homicidal thoughts intent or plans  No preoccupation with violence  No phobias obsessions or compulsions    Still does appear to be somewhat suspicious accusing some staff of tampering with the inhalant and the spiral meter and still psychosomatic about fear of dying from talking or from not breathing etc   Perceptual Disturbances: None    Risk Potential: Inability to care for herself and refusal to take showers regularly   Sensorium:  person, place, time/date, situation, day of week, month of year, year and time   Cognition:  grossly intact no language deficit, no deficit in recent or remote memory, aware of current events  Consciousness:  alert and awake    Attention: Fair   Intellect: Average   Insight:  Limited   Judgment: fair       Motor Activity: no abnormal movements         Recent Labs:  Results Reviewed     None          I/O Past 24 hours:  No intake/output data recorded  No intake/output data recorded  Assessment / Plan:     Schizoaffective disorder, bipolar type ([address] )  Overall status:  improving    Reason for continued inpatient care: to assess ability to maintain improvement by attending to ADLs and taking showers regular and see how she does on outing with family after another outing with staff escort next week  Acceptance by patient: accepting now  Hopefulness in recovery: living at the [facility] [place] soon  Understanding of medications :  yes  Involved in reintegration process: attending groups and has off grounds and off unit privileges   Trusting in relatoinship with psychiatrist:  Tiffanie Yates now  Overall status :  No changes  Recommended Treatment:    Medication changes:  1) none today    Non-pharmacological treatments  1) Continue with individual therapy, group therapy, milieu therapy and occupational therapy using recovery principles and psycho-education about accepting illness and need for treatment   2) encourage to take showers twice a week and cooperate with treatment and adl skills as per her behav  plan  3) another pass with staff to community today again before pass with sister to see if she would fully comply with assigned outing before pass to community with sister  4) Prepare for the [address] scheduled for next Wednesday as she already had the intake interview  Safety  1) Safety/communication plan established targeting dynamic risk factors above    Discharge Plan back to a [facility] at [address]  / Coordination of Care    Total floor / unit time spent today 15 minutes  Greater than 50% of total time was spent with the patient and / or family counseling and / or coordination of care  Receptive to listening and learning coping skills for management of symptoms and attending to ADLs  Patient's Rights, confidentiality and exceptions to confidentiality, use of automated medical record, Jeb Patrick staff access to medical record, and consent to treatment reviewed      Krystyna Mcclain MD

## 2020-03-04 NOTE — NURSING NOTE
Received pt in bed at change of shift with eyes closed; chest movement noted  Continues the same thus this far as per continual observation  Will continue to monitor

## 2020-03-04 NOTE — PLAN OF CARE
Problem: DISCHARGE PLANNING  Goal: Discharge to home or other facility with appropriate resources  Description  INTERVENTIONS:  - Conduct assessment to determine patient/family and health care team treatment goals, and need for post-acute services based on payer coverage, community resources, and patient preferences, and barriers to discharge  - Address psychosocial, clinical, and financial barriers to discharge as identified in assessment in conjunction with the patient/family and health care team  - Assist the patient in reintegration back into the community by removing barriers which may hinder a successful discharge once deemed stable  - Arrange appropriate level of post-acute services according to patient's needs and preference and payer coverage in collaboration with the physician and health care team  - Communicate with and update the patient/family, physician, and health care team regarding progress on the discharge plan  - Arrange appropriate transportation to post-acute venues    Outcome: Progressing     Per the request of Nubia Alexander with [employer] [facility], CL had Dr Shani Lyles complete the [phone] form stating patient is not capable of handling her own finances  CM faxed the form back to Mayo Martinez for her to complete and submit the rep-payee application  CM will continue to follow patient's progress and assist with discharge planning needs

## 2020-03-04 NOTE — PROGRESS NOTES
Late entry Progress Note for March 3, 2020 - Arvil Model 1955, 60 y o  female MRN: [id number]    Unit/Bed#: [facility] 110-02 Encounter: [id number]    Primary Care Provider: Galen Torres PA-C   Date and time admitted to hospital: 7/23/2019  5:30 PM        * Schizoaffective disorder, bipolar type [facility])  Assessment & Plan  Patient continues to do fairly well and EKG which shows repeated yesterday which came back as unremarkable with  which has come down since the previous 1 done a month ago  She has psychosomatic and was asking me to do cardiac workup because of the EKG and I reassured her that the cardiologist had told me that she does not need any workup when checked a month ago  She is still afraid that she may die from choking and that she cannot breathe etc   She tends to lay back on her bed but does wake up freely when approached  Her grooming is not up to par as she has a tendency to take showers 2 times a week with lot of reminders and promptings by staff  She is excited about going for a tour off the [address] [place] [address] were there is supposedly a bed as reported by the [facility] representative  She has E eating fairly well and sleeping well and is now beginning to attend groups and is hoping to be discharged to the personal care home  She also knows that she needs to comply with what routine before she is scheduled to go out for another day pass with staff following which she can possibly go out with her sister for another pass and she is anticipating that  She is still suspicious about certain staff tampering with her oxygen concentrator but has not verbalized any delusions about having an implant under her lipoma on    He still tends to talk in a stilted manner as if talking in a court of law    Current medications:    Current Facility-Administered Medications:     acetaminophen (TYLENOL) tablet 325 mg, 325 mg, Oral, Q6H PRN, Jeffrey Gallegos MD    acetaminophen (TYLENOL) tablet 650 mg, 650 mg, Oral, Q6H PRN, Allie Guzman MD    acetaminophen (TYLENOL) tablet 650 mg, 650 mg, Oral, Q8H PRN, Allie Guzman MD    albuterol (PROVENTIL HFA,VENTOLIN HFA) inhaler 2 puff, 2 puff, Inhalation, Q4H PRN, Allie Guzman MD, 2 puff at 10/11/19 0424    aluminum-magnesium hydroxide-simethicone (MYLANTA) 200-200-20 mg/5 mL oral suspension 15 mL, 15 mL, Oral, Q4H PRN, Allie Guzman MD, 15 mL at 01/26/20 1021    ammonium lactate (LAC-HYDRIN) 12 % lotion 1 application, 1 application, Topical, BID PRN, Allie Guzman MD    benzonatate (TESSALON PERLES) capsule 100 mg, 100 mg, Oral, TID PRN, Allie Guzman MD    benztropine (COGENTIN) injection 1 mg, 1 mg, Intramuscular, Q8H PRN, Allie Guzman MD    carbamide peroxide (DEBROX) 6 5 % otic solution 5 drop, 5 drop, Left Ear, BID PRN, Jerica Victoria MD    cloZAPine (CLOZARIL) tablet 25 mg, 25 mg, Oral, BID, Allie Guzman MD, 25 mg at 03/02/20 2143    cloZAPine (CLOZARIL) tablet 50 mg, 50 mg, Oral, BID, Allie Guzman MD, 50 mg at 03/02/20 2143    docusate sodium (COLACE) capsule 100 mg, 100 mg, Oral, BID PRN, Allie Guzman MD    EPINEPHrine PF (ADRENALIN) 1 mg/mL injection 0 15 mg, 0 15 mg, Intramuscular, Once PRN, Allie Guzman MD    fluticasone-vilanterol (BREO ELLIPTA) 200-25 MCG/INH inhaler 1 puff, 1 puff, Inhalation, Daily, Allie Guzman MD, 1 puff at 03/02/20 0845    ketotifen (ZADITOR) 0 025 % ophthalmic solution 1 drop, 1 drop, Right Eye, BID PRN, Allie Guzman MD    levothyroxine tablet 125 mcg, 125 mcg, Oral, Early Morning, Allie Guzman MD, 125 mcg at 03/03/20 0606    magnesium hydroxide (MILK OF MAGNESIA) 400 mg/5 mL oral suspension 30 mL, 30 mL, Oral, Daily PRN, Allie Guzman MD    montelukast (SINGULAIR) tablet 10 mg, 10 mg, Oral, HS, Allie Guzman MD, 10 mg at 02/22/20 2104    OLANZapine (ZyPREXA) IM injection 5 mg, 5 mg, Intramuscular, Q8H PRN, Allie Guzman MD    OLANZapine (ZyPREXA) tablet 5 mg, 5 mg, Oral, Q8H PRN, Allie Guzman MD  [facility] ondansetron (ZOFRAN-ODT) dispersible tablet 4 mg, 4 mg, Oral, Q6H PRN, Zac Sierra MD, 4 mg at 12/09/19 1757    pantoprazole (PROTONIX) EC tablet 40 mg, 40 mg, Oral, Early Morning, Zac Sierra MD, 40 mg at 03/03/20 0607    polyethylene glycol (MIRALAX) packet 17 g, 17 g, Oral, Daily PRN, Zac Sierra MD    polyvinyl alcohol (LIQUIFILM TEARS) 1 4 % ophthalmic solution 1 drop, 1 drop, Both Eyes, Q3H PRN, Zac Sierra MD    sertraline (ZOLOFT) tablet 175 mg, 175 mg, Oral, Daily, Zac Sierra MD, 175 mg at 03/02/20 0846    sucralfate (CARAFATE) oral suspension 1,000 mg, 1,000 mg, Oral, BID, Yani Toribio MD, 1,000 mg at 03/03/20 0607    theophylline (JEF-24) 24 hr capsule 200 mg, 200 mg, Oral, Daily, Zac Sierra MD, 200 mg at 03/02/20 0846    tiotropium [facility]) capsule for inhaler 18 mcg, 18 mcg, Inhalation, Daily, Zac Sierra MD, 18 mcg at 03/02/20 0845    traZODone (DESYREL) tablet 25 mg, 25 mg, Oral, HS PRN, Zac Sierra MD  Justification if on more than two antipsychotics: not applicable  Side effects if any: none    Risks , benefits, side effects and precautions of medications discussed with patient and reminded patient to let us know any problems with medications     Objective:     Vital Signs:  Vitals:    03/01/20 2015 03/02/20 0700 03/02/20 1500 03/02/20 1900   BP: (!) 80/60 93/68 97/57 (!) 89/58   BP Location: Left arm Right arm Left arm Left arm   Pulse: 94 74 73 67   Resp: 16 18 15 14   Temp: (!) 97 2 °F (36 2 °C) 97 6 °F (36 4 °C) (!) 97 °F (36 1 °C) 97 7 °F (36 5 °C)   TempSrc: Temporal  Temporal Temporal   SpO2: 92% 94% 95% 92%   Weight:       Height:         Appearance:  age appropriate and older than stated age better groomed with better eye contact   Behavior:  deviant, evasive and guarded becoming argumentative with suspicious at times   Speech:  delayed, increased latency of response and tangential    Mood:  anxious, euphoric and irritable    Affect:  increased in intensity, increased in range, mood-congruent and redirectable   Thought Process:  circumstantial, concrete, goal directed, illogical and perserverative with tendency to become still   Thought Content:  delusions  grandiose and persecutory no current suicidal homicidal thoughts or plans  No  preoccupation with violence  Still with some paranoia and accusatory to certain staff tampering with her oxygen concentrator night  No phobias obsessions or compulsions  Perceptual Disturbances: None    Risk Potential: Inability to care for herself and refusal to take showers regularly   Sensorium:  person, place, time/date, situation, day of week, month of year, year and time   Cognition:  grossly intact no language deficit, no deficit in recent or remote memory, aware of current events like the corona virus scare   Consciousness:  alert and awake    Attention: Fair   Intellect: Average   Insight:  Limited   Judgment: fair       Motor Activity: no abnormal movements         Recent Labs:  Results Reviewed     None          I/O Past 24 hours:  No intake/output data recorded  No intake/output data recorded          Assessment / Plan:     Schizoaffective disorder, bipolar type ([facility] [address] )  Overall status:  improving    Reason for continued inpatient care: to assess ability to maintain improvement by attending to ADLs and taking showers regular and see how she does on outing with family after another outing with staff escort next week  Acceptance by patient: accepting now  Hopefulness in recovery: living at the [facility]  Understanding of medications :  yes  Involved in reintegration process: attending groups and has off grounds and off unit privileges   Trusting in relatoinship with psychiatrist:  Man Sheffield now  Overall status :  No changes  Recommended Treatment:    Medication changes:  1) none today    Non-pharmacological treatments  1) Continue with individual therapy, group therapy, milieu therapy and occupational therapy using recovery principles and psycho-education about accepting illness and need for treatment   2) encourage to take showers twice a week and cooperate with treatment and adl skills as per her behav  plan  3) another pass with staff to community today again before pass with sister to see if she would fully comply with assigned outing before pass to community with sister  4) Prepare for the [address] [place] [address] Rd as she already had the intake interview  Safety  1) Safety/communication plan established targeting dynamic risk factors above  Discharge Plan back to a [facility] at [address] / Coordination of Care    Total floor / unit time spent today 15 minutes  Greater than 50% of total time was spent with the patient and / or family counseling and / or coordination of care  Receptive to listening and learning coping skills for management of symptoms and attending to ADLs  Patient's Rights, confidentiality and exceptions to confidentiality, use of automated medical record, [address] [place] [address] staff access to medical record, and consent to treatment reviewed      Sandhya Bolaños MD

## 2020-03-04 NOTE — PROGRESS NOTES
03/04/20 0800   Team Meeting   Meeting Type Daily Rounds   Team Members Present   Team Members Present Physician;Nurse;; Other (Discipline and Name)   Physician Team Member Dr Haydee Kennedy, RN; Leslie Bello, RN   Care Management Team Member Brenda Johnson   Other (Discipline and Name) Hollie Sharpe LCSW; SHANIKA Mason     Patient attended [facility]  Last shower was 02/29/20  Slept

## 2020-03-05 NOTE — PLAN OF CARE
Problem: Alteration in Thoughts and Perception  Goal: Agree to be compliant with medication regime, as prescribed and report medication side effects  Description  Interventions:  - Offer appropriate PRN medication and supervise ingestion; conduct aims, as needed   Outcome: Progressing     Problem: Depression  Goal: Refrain from isolation  Description  Interventions:  - Develop a trusting relationship   - Encourage socialization   Outcome: Progressing     Problem: RESPIRATORY - ADULT  Goal: Achieves optimal ventilation and oxygenation  Description  INTERVENTIONS:  - Assess for changes in respiratory status  - Assess for changes in mentation and behavior  - Position to facilitate oxygenation and minimize respiratory effort  - Oxygen administration by appropriate delivery method based on oxygen saturation (per order) or ABGs  - Initiate smoking cessation education as indicated  - Encourage broncho-pulmonary hygiene including cough, deep breathe, Incentive Spirometry  - Assess the need for suctioning and aspirate as needed  - Assess and instruct to report SOB or any respiratory difficulty  - Respiratory Therapy support as indicated  Outcome: Progressing     Problem: Alteration in Thoughts and Perception  Goal: Attend and participate in unit activities, including therapeutic, recreational, and educational groups  Description  Interventions:  - Provide therapeutic and educational activities daily, encourage attendance and participation, and document same in the medical record     CERTIFIED PEER SPECIALIST INTERVENTIONS:    Complete peer assessment with patient to assess their needs and identify their goals to complete while in the recovery program as well as once discharged into the community  Patient will complete WRAP Plan, Crisis Plan and [facility] [employer]  Patient will attend 50% of groups offered on the unit  Patient will complete a goal card weekly      Outcome: Not Progressing  Goal: Complete daily ADLs, including personal hygiene independently, as able  Description  Interventions:  - Observe, teach, and assist patient with ADLS  - Monitor and promote a balance of rest/activity, with adequate nutrition and elimination   Outcome: Not Progressing     Problem: Nutrition/Hydration-ADULT  Goal: Nutrient/Hydration intake appropriate for improving, restoring or maintaining nutritional needs  Description  Monitor and assess patient's nutrition/hydration status for malnutrition  Collaborate with interdisciplinary team and initiate plan and interventions as ordered  Monitor patient's weight and dietary intake as ordered or per policy  Utilize nutrition screening tool and intervene as necessary  Determine patient's food preferences and provide high-protein, high-caloric foods as appropriate  INTERVENTIONS:  - Monitor oral intake, urinary output, labs, and treatment plans  - Assess nutrition and hydration status and recommend course of action  - Evaluate amount of meals eaten  - Assist patient with eating if necessary   - Allow adequate time for meals  - Recommend/ encourage appropriate diets, oral nutritional supplements, and vitamin/mineral supplements  - Order, calculate, and assess calorie counts as needed  - Recommend, monitor, and adjust tube feedings and TPN/PPN based on assessed needs  - Assess need for intravenous fluids  - Provide specific nutrition/hydration education as appropriate  - Include patient/family/caregiver in decisions related to nutrition  Outcome: Not Progressing     [address] was visible @ long intervals in milieu, sitting out in dining room or arrieta chair @ phone when it was not in use  She is pleasant  Anticipates a community pass Friday  "I need to shower tomorrow evening " Reminded to in AM Friday to wash face, scrub teeth, change clothes  Agreeable  Did come for scheduled medicines except the Singulair  Did not eat any supper, but, drank the Ensure  Had cookies & milk for snack   Used the Incentive spirometer & did well achieving consistently 1250ml or slightly higher  Did not attend PM group as will not go outside to deck  Wearing now her QHS humidified nasal O2 @ 1L for bed

## 2020-03-05 NOTE — PLAN OF CARE
Problem: Alteration in Thoughts and Perception  Goal: Verbalize thoughts and feelings  Description  Interventions:  - Promote a nonjudgmental and trusting relationship with the patient through active listening and therapeutic communication  - Assess patient's level of functioning, behavior and potential for risk  - Engage patient in 1 on 1 interactions for a minimum of 15 minutes each session  - Encourage patient to express fears, feelings, frustrations, and discuss symptoms    - Collins patient to reality, help patient recognize reality-based thinking   - Administer medications as ordered and assess for potential side effects  - Provide the patient education related to the signs and symptoms of the illness and desired effects of prescribed medications  Outcome: Progressing  Goal: Agree to be compliant with medication regime, as prescribed and report medication side effects  Description  Interventions:  - Offer appropriate PRN medication and supervise ingestion; conduct aims, as needed   Outcome: Progressing  Goal: Attend and participate in unit activities, including therapeutic, recreational, and educational groups  Description  Interventions:  - Provide therapeutic and educational activities daily, encourage attendance and participation, and document same in the medical record     CERTIFIED PEER SPECIALIST INTERVENTIONS:    Complete peer assessment with patient to assess their needs and identify their goals to complete while in the recovery program as well as once discharged into the community  Patient will complete WRAP Plan, Crisis Plan and 5 Life Domains  Patient will attend 50% of groups offered on the unit  Patient will complete a goal card weekly      Outcome: Progressing  Goal: Complete daily ADLs, including personal hygiene independently, as able  Description  Interventions:  - Observe, teach, and assist patient with ADLS  - Monitor and promote a balance of rest/activity, with adequate nutrition and elimination   Outcome: Cherelle Isabel has been intermittently visible on the unit, med and meal compliant  Says that she will tend to her hygiene next shift  Attended select groups, somatic at times but redirectable  Behaviors controlled  No complaints of discomfort or behavioral issues at this time  Will continue to monitor for changes

## 2020-03-05 NOTE — PROGRESS NOTES
03/05/20 0900   Team Meeting   Meeting Type Daily Rounds   Team Members Present   Team Members Present Physician;Nurse;; Other (Discipline and Name)   Physician Team Member Dr Hi Rubio Team Member Mary Moore RN   Care Management Team Member Brenda Vizcarra   Other (Discipline and Name) Moisés Greene LCSW     Patient presents disheveled  Scheduled for pass with staff tomorrow as long as she completes hygiene  Slept

## 2020-03-05 NOTE — PROGRESS NOTES
Progress Note - Nam Camacho 1955, 60 y o  female MRN: [id number]    Unit/Bed#: [facility] 110-02 Encounter: [id number]    Primary Care Provider: Gabo Stubbs PA-C   Date and time admitted to hospital: 7/23/2019  5:30 PM        * Schizoaffective disorder, bipolar type [facility])  Assessment & Plan  Patient is excited about the tour to the [facility] scheduled for next week  She is also anticipating to go to the community tomorrow with staff escort and knows that she has to take a shower and be ready in the morning so that she can go on a pass with her sister the next week  She is trying to attend most of the groups and still appears somewhat poorly groomed and disheveled  She is still voices some suspicion about certain staff tampering with her oxygen concentrator and inhalant off and on  She does not voice any other overt delusions but does continue to have psychosomatic symptoms and is afraid that she may choke here and she may not be able to swallow or breathe etc   She has been attending most of the groups and is compliant with medications and denies feeling excessively sad    She is still talking with a tendency to become stilted as if in a court of law    Current medications:    Current Facility-Administered Medications:     acetaminophen (TYLENOL) tablet 325 mg, 325 mg, Oral, Q6H PRN, Ivonne Nevarez MD    acetaminophen (TYLENOL) tablet 650 mg, 650 mg, Oral, Q6H PRN, Ivonne Nevarez MD    acetaminophen (TYLENOL) tablet 650 mg, 650 mg, Oral, Q8H PRN, Ivonne Nevarez MD    albuterol (PROVENTIL HFA,VENTOLIN HFA) inhaler 2 puff, 2 puff, Inhalation, Q4H PRN, Ivonne Nevarez MD, 2 puff at 10/11/19 0424    aluminum-magnesium hydroxide-simethicone (MYLANTA) 200-200-20 mg/5 mL oral suspension 15 mL, 15 mL, Oral, Q4H PRN, Ivonne Nevarez MD, 15 mL at 01/26/20 1021    ammonium lactate (LAC-HYDRIN) 12 % lotion 1 application, 1 application, Topical, BID PRN, Ivonne Roers, MD    benzonatate (TESSALON PERLES) capsule 100 mg, 100 mg, Oral, TID PRN, Amina Aparicio MD    benztropine (COGENTIN) injection 1 mg, 1 mg, Intramuscular, Q8H PRN, Amina Aparicio MD    carbamide peroxide (DEBROX) 6 5 % otic solution 5 drop, 5 drop, Left Ear, BID PRN, Castro Grant MD    cloZAPine (CLOZARIL) tablet 25 mg, 25 mg, Oral, BID, Amina Aparicio MD, 25 mg at 03/04/20 2134    cloZAPine (CLOZARIL) tablet 50 mg, 50 mg, Oral, BID, Amina Aparicio MD, 50 mg at 03/04/20 2135    docusate sodium (COLACE) capsule 100 mg, 100 mg, Oral, BID PRN, Amina Aparicio MD    EPINEPHrine PF (ADRENALIN) 1 mg/mL injection 0 15 mg, 0 15 mg, Intramuscular, Once PRN, Amina Aparicio MD    fluticasone-vilanterol (BREO ELLIPTA) 200-25 MCG/INH inhaler 1 puff, 1 puff, Inhalation, Daily, Amina Aparicio MD, 1 puff at 03/04/20 0842    ketotifen (ZADITOR) 0 025 % ophthalmic solution 1 drop, 1 drop, Right Eye, BID PRN, Amina Aparicio MD    levothyroxine tablet 125 mcg, 125 mcg, Oral, Early Morning, Amina Aparicio MD, 125 mcg at 03/05/20 0032    magnesium hydroxide (MILK OF MAGNESIA) 400 mg/5 mL oral suspension 30 mL, 30 mL, Oral, Daily PRN, Amina Aparicio MD    montelukast (SINGULAIR) tablet 10 mg, 10 mg, Oral, HS, Amina Aparicio MD, 10 mg at 02/22/20 2104    OLANZapine (ZyPREXA) IM injection 5 mg, 5 mg, Intramuscular, Q8H PRN, Amina Aparicio MD    OLANZapine (ZyPREXA) tablet 5 mg, 5 mg, Oral, Q8H PRN, Amina Aparicio MD    ondansetron (ZOFRAN-ODT) dispersible tablet 4 mg, 4 mg, Oral, Q6H PRN, Amina Aparicio MD, 4 mg at 12/09/19 1757    pantoprazole (PROTONIX) EC tablet 40 mg, 40 mg, Oral, Early Morning, Amina Aparicio MD, 40 mg at 03/05/20 2500    polyethylene glycol (MIRALAX) packet 17 g, 17 g, Oral, Daily PRN, Amina Aparicio MD    polyvinyl alcohol (LIQUIFILM TEARS) 1 4 % ophthalmic solution 1 drop, 1 drop, Both Eyes, Q3H PRN, Amina Aparicio MD    sertraline (ZOLOFT) tablet 175 mg, 175 mg, Oral, Daily, Amina Aparicio MD, 175 mg at 03/04/20 0842    sucralfate (CARAFATE) oral suspension 1,000 mg, 1,000 mg, Oral, BID, Beryl Cruz MD, 1,000 mg at 03/05/20 6783    theophylline (JEF-24) 24 hr capsule 200 mg, 200 mg, Oral, Daily, Chichi Lockett MD, 200 mg at 03/04/20 0842    tiotropium (SPIRIVA) capsule for inhaler 18 mcg, 18 mcg, Inhalation, Daily, Chichi Lockett MD, 18 mcg at 03/04/20 0841    traZODone (DESYREL) tablet 25 mg, 25 mg, Oral, HS PRN, Chichi Lockett MD  Justification if on more than two antipsychotics: not applicable  Side effects if any: none    Risks , benefits, side effects and precautions of medications discussed with patient and reminded patient to let us know any problems with medications     Objective:     Vital Signs:  Vitals:    03/04/20 0732 03/04/20 1500 03/04/20 1952 03/05/20 0600   BP: 104/57 118/74 93/58    BP Location: Left arm Left arm Left arm    Pulse: 72 89 71    Resp:  18 18    Temp:  97 6 °F (36 4 °C) (!) 97 1 °F (36 2 °C)    TempSrc:  Temporal Temporal    SpO2:   91% 95%   Weight:       Height:         Appearance:  age appropriate, casually dressed, disheveled and older than stated age with better eye contact   Behavior:  deviant, evasive and guarded argumentative and suspicious attacked   Speech:  delayed, increased latency of response and tangential    Mood:  anxious, euphoric and irritable    Affect:  increased in intensity, increased in range, mood-congruent and redirectable   Thought Process:  circumstantial, concrete, goal directed, illogical and perserverative with tendency to become stilted   Thought Content:  delusions  grandiose and persecutory no preoccupation with violence  No current suicidal homicidal thoughts intent or plans  No overt delusions other than some paranoia about staff tampering with her oxygen concentrator any inhalant, still psychosomatic about fear of dying from shortness of breath or choking and something wrong with her heart etc   No phobias obsessions or compulsions otherwise       Perceptual Disturbances: None    Risk Potential: Inability to care for herself and refusal to take showers regularly   Sensorium:  person, place, time/date, situation, day of week, month of year, year and time   Cognition:  grossly intact no language deficit, aware of current events, no deficit in recent or remote memory   Consciousness:  alert and awake    Attention: Fair   Intellect: Average   Insight:  Improving   Judgment: fair       Motor Activity: no abnormal movements         Recent Labs:  Results Reviewed     None          I/O Past 24 hours:  No intake/output data recorded  No intake/output data recorded  Assessment / Plan:     Schizoaffective disorder, bipolar type ([facility] [address] )  Overall status:  improving    Reason for continued inpatient care: to assess ability to maintain improvement by attending to ADLs and taking showers regular and see how she does on outing with family after another outing with staff escort next week  Acceptance by patient: accepting now  Hopefulness in recovery: living at the [facility]  Understanding of medications :  yes  Involved in reintegration process: attending groups and has off grounds and off unit privileges   Trusting in relatoinship with psychiatrist:  Maida Elias now  Overall status :  No changes  Recommended Treatment:    Medication changes:  1) none today    Non-pharmacological treatments  1) Continue with individual therapy, group therapy, milieu therapy and occupational therapy using recovery principles and psycho-education about accepting illness and need for treatment     2) encourage to take showers twice a week and cooperate with treatment and adl skills as per her behav  plan  3) another pass with staff to community today again before pass with sister to see if she would fully comply with assigned outing before pass to community with sister  4) [facility] as she already had the intake interview and for therapeutic pass with staff tomorrow  Safety  1) Safety/communication plan established targeting dynamic risk factors above  Discharge Plan back to a [facility] at [address] / Coordination of Care    Total floor / unit time spent today 15 minutes  Greater than 50% of total time was spent with the patient and / or family counseling and / or coordination of care  Receptive to listening and learning coping skills for management of symptoms and attending to ADLs  Patient's Rights, confidentiality and exceptions to confidentiality, use of automated medical record, [address] [place] [address] staff access to medical record, and consent to treatment reviewed      Roberto Colorado MD

## 2020-03-05 NOTE — ASSESSMENT & PLAN NOTE
Patient is excited about the tour to the [facility] scheduled for next week  She is also anticipating to go to the community tomorrow with staff escort and knows that she has to take a shower and be ready in the morning so that she can go on a pass with her sister the next week  She is trying to attend most of the groups and still appears somewhat poorly groomed and disheveled  She is still voices some suspicion about certain staff tampering with her oxygen concentrator and inhalant off and on  She does not voice any other overt delusions but does continue to have psychosomatic symptoms and is afraid that she may choke here and she may not be able to swallow or breathe etc   She has been attending most of the groups and is compliant with medications and denies feeling excessively sad    She is still talking with a tendency to become stilted as if in a court of law    Current medications:    Current Facility-Administered Medications:     acetaminophen (TYLENOL) tablet 325 mg, 325 mg, Oral, Q6H PRN, Chichi Lockett MD    acetaminophen (TYLENOL) tablet 650 mg, 650 mg, Oral, Q6H PRN, Chichi Lockett MD    acetaminophen (TYLENOL) tablet 650 mg, 650 mg, Oral, Q8H PRN, Chichi Lockett MD    albuterol (PROVENTIL HFA,VENTOLIN HFA) inhaler 2 puff, 2 puff, Inhalation, Q4H PRN, Chichi Lockett MD, 2 puff at 10/11/19 0424    aluminum-magnesium hydroxide-simethicone (MYLANTA) 200-200-20 mg/5 mL oral suspension 15 mL, 15 mL, Oral, Q4H PRN, Chichi Lockett MD, 15 mL at 01/26/20 1021    ammonium lactate (LAC-HYDRIN) 12 % lotion 1 application, 1 application, Topical, BID PRN, Chichi Lockett MD    benzonatate (TESSALON PERLES) capsule 100 mg, 100 mg, Oral, TID PRN, Chichi Lockett MD    benztropine (COGENTIN) injection 1 mg, 1 mg, Intramuscular, Q8H PRN, Chichi Lockett MD    carbamide peroxide (DEBROX) 6 5 % otic solution 5 drop, 5 drop, Left Ear, BID PRN, Lara Draper MD    cloZAPine (CLOZARIL) tablet 25 mg, 25 mg, Oral, BID, Jerome Lopez MD, 25 mg at 03/04/20 2134    cloZAPine (CLOZARIL) tablet 50 mg, 50 mg, Oral, BID, Jerome Lopez MD, 50 mg at 03/04/20 2135    docusate sodium (COLACE) capsule 100 mg, 100 mg, Oral, BID PRN, Jerome Lopez MD    EPINEPHrine PF (ADRENALIN) 1 mg/mL injection 0 15 mg, 0 15 mg, Intramuscular, Once PRN, Jerome Lopez MD    fluticasone-vilanterol (BREO ELLIPTA) 200-25 MCG/INH inhaler 1 puff, 1 puff, Inhalation, Daily, Jerome Lopez MD, 1 puff at 03/04/20 0842    ketotifen (ZADITOR) 0 025 % ophthalmic solution 1 drop, 1 drop, Right Eye, BID PRN, Jerome Lopez MD    levothyroxine tablet 125 mcg, 125 mcg, Oral, Early Morning, Jerome Lopez MD, 125 mcg at 03/05/20 2332    magnesium hydroxide (MILK OF MAGNESIA) 400 mg/5 mL oral suspension 30 mL, 30 mL, Oral, Daily PRN, Jerome Lopez MD    montelukast (SINGULAIR) tablet 10 mg, 10 mg, Oral, HS, Jerome Lopez MD, 10 mg at 02/22/20 2104    OLANZapine (ZyPREXA) IM injection 5 mg, 5 mg, Intramuscular, Q8H PRN, Jerome Lopez MD    OLANZapine (ZyPREXA) tablet 5 mg, 5 mg, Oral, Q8H PRN, Jerome Lopez MD    ondansetron (ZOFRAN-ODT) dispersible tablet 4 mg, 4 mg, Oral, Q6H PRN, Jerome Lopez MD, 4 mg at 12/09/19 1757    pantoprazole (PROTONIX) EC tablet 40 mg, 40 mg, Oral, Early Morning, Jerome Lopez MD, 40 mg at 03/05/20 1668    polyethylene glycol (MIRALAX) packet 17 g, 17 g, Oral, Daily PRN, Jerome Lopez MD    polyvinyl alcohol (LIQUIFILM TEARS) 1 4 % ophthalmic solution 1 drop, 1 drop, Both Eyes, Q3H PRN, Jerome Lopez MD    sertraline (ZOLOFT) tablet 175 mg, 175 mg, Oral, Daily, Jerome Lopez MD, 175 mg at 03/04/20 0842    sucralfate (CARAFATE) oral suspension 1,000 mg, 1,000 mg, Oral, BID, Cat Torres MD, 1,000 mg at 03/05/20 0616    theophylline (JEF-24) 24 hr capsule 200 mg, 200 mg, Oral, Daily, Jerome Lopez MD, 200 mg at 03/04/20 0842    tiotropium (SPIRIVA) capsule for inhaler 18 mcg, 18 mcg, Inhalation, Daily, Jerome Lopez MD, 18 mcg at 03/04/20 2730   traZODone (DESYREL) tablet 25 mg, 25 mg, Oral, HS PRN, Manuel Copeland MD  Justification if on more than two antipsychotics: not applicable  Side effects if any: none    Risks , benefits, side effects and precautions of medications discussed with patient and reminded patient to let us know any problems with medications     Objective:     Vital Signs:  Vitals:    03/04/20 0732 03/04/20 1500 03/04/20 1952 03/05/20 0600   BP: 104/57 118/74 93/58    BP Location: Left arm Left arm Left arm    Pulse: 72 89 71    Resp:  18 18    Temp:  97 6 °F (36 4 °C) (!) 97 1 °F (36 2 °C)    TempSrc:  Temporal Temporal    SpO2:   91% 95%   Weight:       Height:         Appearance:  age appropriate, casually dressed, disheveled and older than stated age with better eye contact   Behavior:  deviant, evasive and guarded argumentative and suspicious attacked   Speech:  delayed, increased latency of response and tangential    Mood:  anxious, euphoric and irritable    Affect:  increased in intensity, increased in range, mood-congruent and redirectable   Thought Process:  circumstantial, concrete, goal directed, illogical and perserverative with tendency to become stilted   Thought Content:  delusions  grandiose and persecutory no preoccupation with violence  No current suicidal homicidal thoughts intent or plans  No overt delusions other than some paranoia about staff tampering with her oxygen concentrator any inhalant, still psychosomatic about fear of dying from shortness of breath or choking and something wrong with her heart etc   No phobias obsessions or compulsions otherwise       Perceptual Disturbances: None    Risk Potential: Inability to care for herself and refusal to take showers regularly   Sensorium:  person, place, time/date, situation, day of week, month of year, year and time   Cognition:  grossly intact no language deficit, aware of current events, no deficit in recent or remote memory   Consciousness:  alert and awake Attention: Fair   Intellect: Average   Insight:  Improving   Judgment: fair       Motor Activity: no abnormal movements         Recent Labs:  Results Reviewed     None          I/O Past 24 hours:  No intake/output data recorded  No intake/output data recorded  Assessment / Plan:     Schizoaffective disorder, bipolar type ([address] )  Overall status:  improving    Reason for continued inpatient care: to assess ability to maintain improvement by attending to ADLs and taking showers regular and see how she does on outing with family after another outing with staff escort next week  Acceptance by patient: accepting now  Hopefulness in recovery: living at the [facility] soon  Understanding of medications :  yes  Involved in reintegration process: attending groups and has off grounds and off unit privileges   Trusting in relatoinship with psychiatrist:  Nohemy Sandoval now  Overall status :  No changes  Recommended Treatment:    Medication changes:  1) none today    Non-pharmacological treatments  1) Continue with individual therapy, group therapy, milieu therapy and occupational therapy using recovery principles and psycho-education about accepting illness and need for treatment   2) encourage to take showers twice a week and cooperate with treatment and adl skills as per her behav  plan  3) another pass with staff to community today again before pass with sister to see if she would fully comply with assigned outing before pass to community with sister  4) Prepare for the [facility] scheduled for next Wednesday as she already had the intake interview and for therapeutic pass with staff tomorrow  Safety  1) Safety/communication plan established targeting dynamic risk factors above  Discharge Plan back to a [facility] at [address] / Coordination of Care    Total floor / unit time spent today 15 minutes   Greater than 50% of total time was spent with the patient and / or family counseling and / or coordination of care   Receptive to listening and learning coping skills for management of symptoms and attending to ADLs  Patient's Rights, confidentiality and exceptions to confidentiality, use of automated medical record, Jeb Patrick staff access to medical record, and consent to treatment reviewed      Felisa Florence MD

## 2020-03-06 NOTE — NURSING NOTE
Received pt in bed at change of shift with eyes closed; chest movement noted  Continues the same thus this far as per continual round  Will continue to monitor

## 2020-03-06 NOTE — PROGRESS NOTES
[address] did use the [employer], but, w/lower volumes; 1000-1100ml as feeling tired this evening after exerting herself w/hygiene, going to group  She did have an HS snack, came up for HS medicine (except the Singulair)  Wearing now her QHS humidified nasal O2 @ 1L for bed

## 2020-03-06 NOTE — PLAN OF CARE
Problem: Alteration in Thoughts and Perception  Goal: Agree to be compliant with medication regime, as prescribed and report medication side effects  Description  Interventions:  - Offer appropriate PRN medication and supervise ingestion; conduct aims, as needed   Outcome: Progressing  Goal: Attend and participate in unit activities, including therapeutic, recreational, and educational groups  Description  Interventions:  - Provide therapeutic and educational activities daily, encourage attendance and participation, and document same in the medical record     CERTIFIED PEER SPECIALIST INTERVENTIONS:    Complete peer assessment with patient to assess their needs and identify their goals to complete while in the recovery program as well as once discharged into the community  Patient will complete WRAP Plan, Crisis Plan and 5 Life Domains  Patient will attend 50% of groups offered on the unit  Patient will complete a goal card weekly  Outcome: Progressing     Problem: Risk for Self Injury/Neglect  Goal: Attend and participate in unit activities, including therapeutic, recreational, and educational groups  Description  Interventions:  - Provide therapeutic and educational activities daily, encourage attendance and participation, and document same in the medical record  - Obtain collateral information, encourage visitation and family involvement in care   Outcome: Progressing     Problem: Depression  Goal: Refrain from isolation  Description  Interventions:  - Develop a trusting relationship   - Encourage socialization   Outcome: Benny Braxton has been intermittently visible on the unit, med and meal compliant  Enjoyed her pass to the park with staff member, no complaints or concerns upon return to the unit  Says that she will tend to her hygiene next shift  Attended select groups  No somatic concerns or complaints at this time of documentation  Behaviors controlled   No complaints of discomfort or behavioral issues at this time  Will continue to monitor for changes

## 2020-03-06 NOTE — PROGRESS NOTES
03/06/20 0800   Team Meeting   Meeting Type Daily Rounds   Team Members Present   Team Members Present Physician;Nurse;; Other (Discipline and Name)   Physician Team Member Dr Jessica Mancia, RN   Care Management Team Member Brenda Vargas   Other (Discipline and Name) Jarred Schmidt LCSW     Patient attended [facility] and 3-11 groups  Showered  Got bin this morning for trip to [facility]

## 2020-03-06 NOTE — PLAN OF CARE
Problem: Alteration in Thoughts and Perception  Goal: Verbalize thoughts and feelings  Description  Interventions:  - Promote a nonjudgmental and trusting relationship with the patient through active listening and therapeutic communication  - Assess patient's level of functioning, behavior and potential for risk  - Engage patient in 1 on 1 interactions for a minimum of 15 minutes each session  - Encourage patient to express fears, feelings, frustrations, and discuss symptoms    - Leslie patient to reality, help patient recognize reality-based thinking   - Administer medications as ordered and assess for potential side effects  - Provide the patient education related to the signs and symptoms of the illness and desired effects of prescribed medications  Outcome: Progressing  Goal: Agree to be compliant with medication regime, as prescribed and report medication side effects  Description  Interventions:  - Offer appropriate PRN medication and supervise ingestion; conduct aims, as needed   Outcome: Progressing  Goal: Complete daily ADLs, including personal hygiene independently, as able  Description  Interventions:  - Observe, teach, and assist patient with ADLS  - Monitor and promote a balance of rest/activity, with adequate nutrition and elimination   Outcome: Progressing     Problem: Depression  Goal: Refrain from isolation  Description  Interventions:  - Develop a trusting relationship   - Encourage socialization   Outcome: Not Progressing     2003 Agustin Edgar was motivated to attend to showering & grooming w/female MHT's help shaving her chin, setting up shower, assisting w/hair care while she did the rest  She understands tomorrow morning will need to wash face, scrub teeth, change clothes to go out on the brief community pass planned  She ate 50% of her meal (mashed potato, bites egg salad) & Ensure  Accepted supper medicines readily  Rested in bed until now where has joined peers to take part in PM Group  Pleasant, but, isolative in free time

## 2020-03-06 NOTE — ASSESSMENT & PLAN NOTE
Patient was evaluated this morning before she went on a day trip with staff escort to the park  She had taken a shower was well groomed and pleasant and friendly  She is also excited about having an interview with the staff from the [address] [place] [address] who will take her out next week for a few hours tour  She is also hoping to go on a pass with her sister for a few hours sometime next week  She continues to stay on her bed most of the time but has been attending most of the groups including [facility] and has been compliant  She is taking showers twice a week and is taking medications  No side effects reported  Eating well and sleeping well  Still somewhat suspicious about certain staff tampering with her inhalant as well as her oxygen concentrator and still psychosomatic about dying from shortness of breath or talking etc   However she is receptive to verbal support and reassurance  Her affect is brighter she is friendly and pleasant and does not report any other overt delusional beliefs  Patient did go on the trip which was uneventful to a park but it was too cold and she had to come back after about half an hour because she had to go to the bathroom  She did not want to go elsewhere nor did she want to smoke according the staff who took her out today      Current medications:    Current Facility-Administered Medications:     [MAR Hold - Suspended Admission] acetaminophen (TYLENOL) tablet 325 mg, 325 mg, Oral, Q6H PRN, Amina Aparicio MD    [facility] Hold - Suspended Admission] acetaminophen (TYLENOL) tablet 650 mg, 650 mg, Oral, Q6H PRN, Amina Aparicio MD    [facility] Hold - Suspended Admission] acetaminophen (TYLENOL) tablet 650 mg, 650 mg, Oral, Q8H PRN, Amina Aparicio MD    [facility] Hold - Suspended Admission] albuterol (PROVENTIL HFA,VENTOLIN HFA) inhaler 2 puff, 2 puff, Inhalation, Q4H PRN, Amina Aparicio MD, 2 puff at 10/11/19 0424    [MAR Hold - Suspended Admission] aluminum-magnesium hydroxide-simethicone (MYLANTA) 200-200-20 mg/5 mL oral suspension 15 mL, 15 mL, Oral, Q4H PRN, Krystyna Mcclain MD, 15 mL at 01/26/20 1021    [MAR Hold - Suspended Admission] ammonium lactate (LAC-HYDRIN) 12 % lotion 1 application, 1 application, Topical, BID PRN, Krystyna Mcclain MD    [facility] Hold - Suspended Admission] benzonatate (TESSALON PERLES) capsule 100 mg, 100 mg, Oral, TID PRN, Krystyna Mcclain MD    [facility] Hold - Suspended Admission] benztropine (COGENTIN) injection 1 mg, 1 mg, Intramuscular, Q8H PRN, Krystyna Mcclain MD    [facility] Hold - Suspended Admission] carbamide peroxide (DEBROX) 6 5 % otic solution 5 drop, 5 drop, Left Ear, BID PRN, Amy Powell MD    [facility] Hold - Suspended Admission] cloZAPine (CLOZARIL) tablet 25 mg, 25 mg, Oral, BID, Krystyna Mcclain MD, 25 mg at 03/05/20 2123    [[facility] Hold - Suspended Admission] cloZAPine (CLOZARIL) tablet 50 mg, 50 mg, Oral, BID, Krystyna Mcclain MD, 50 mg at 03/05/20 2124    [[facility] Hold - Suspended Admission] docusate sodium (COLACE) capsule 100 mg, 100 mg, Oral, BID PRN, Krystyna Mcclain MD    [facility] Hold - Suspended Admission] EPINEPHrine PF (ADRENALIN) 1 mg/mL injection 0 15 mg, 0 15 mg, Intramuscular, Once PRN, Krystyna Mcclain MD    [facility] Hold - Suspended Admission] fluticasone-vilanterol (BREO ELLIPTA) 200-25 MCG/INH inhaler 1 puff, 1 puff, Inhalation, Daily, Krystyna Mcclain MD, 1 puff at 03/06/20 0806    [[facility] Hold - Suspended Admission] ketotifen (ZADITOR) 0 025 % ophthalmic solution 1 drop, 1 drop, Right Eye, BID PRN, Krystyna Mcclain MD    [facility] Hold - Suspended Admission] levothyroxine tablet 125 mcg, 125 mcg, Oral, Early Morning, Krystyna Mcclain MD, 125 mcg at 03/06/20 0602    [[facility] Hold - Suspended Admission] magnesium hydroxide (MILK OF MAGNESIA) 400 mg/5 mL oral suspension 30 mL, 30 mL, Oral, Daily PRN, Krystyna Mcclain MD    [facility] Hold - Suspended Admission] montelukast (SINGULAIR) tablet 10 mg, 10 mg, Oral, HS, Krystyna Mcclain MD, 10 mg at 02/22/20 2104    [[facility] Hold - Suspended Admission] OLANZapine (ZyPREXA) IM injection 5 mg, 5 mg, Intramuscular, Q8H PRN, Zac Sierra MD    [facility] Hold - Suspended Admission] OLANZapine (ZyPREXA) tablet 5 mg, 5 mg, Oral, Q8H PRN, Zac Sierra MD    [facility] Hold - Suspended Admission] ondansetron (ZOFRAN-ODT) dispersible tablet 4 mg, 4 mg, Oral, Q6H PRN, Zac Sierra MD, 4 mg at 12/09/19 1757    [[facility] Hold - Suspended Admission] pantoprazole (PROTONIX) EC tablet 40 mg, 40 mg, Oral, Early Morning, Zac Sierra MD, 40 mg at 03/06/20 0602    [[facility] Hold - Suspended Admission] polyethylene glycol (MIRALAX) packet 17 g, 17 g, Oral, Daily PRN, Zac Sierra MD    [facility] Hold - Suspended Admission] polyvinyl alcohol (LIQUIFILM TEARS) 1 4 % ophthalmic solution 1 drop, 1 drop, Both Eyes, Q3H PRN, Zac Sierra MD    [facility] Hold - Suspended Admission] sertraline (ZOLOFT) tablet 175 mg, 175 mg, Oral, Daily, Zac Sierra MD, 175 mg at 03/06/20 0806    [[facility] Hold - Suspended Admission] sucralfate (CARAFATE) oral suspension 1,000 mg, 1,000 mg, Oral, BID, Yani Toribio MD, 1,000 mg at 03/06/20 0602    [[facility] Hold - Suspended Admission] theophylline (JEF-24) 24 hr capsule 200 mg, 200 mg, Oral, Daily, Zac Sierra MD, 200 mg at 03/06/20 0806    [[facility] Hold - Suspended Admission] tiotropium (SPIRIVA) capsule for inhaler 18 mcg, 18 mcg, Inhalation, Daily, Zac Sierra MD, 18 mcg at 03/06/20 0806    [[facility] Hold - Suspended Admission] traZODone (DESYREL) tablet 25 mg, 25 mg, Oral, HS PRN, Zac Sierra MD    Current Outpatient Medications:     acetaminophen (TYLENOL) 325 mg tablet, Take 2 tablets (650 mg total) by mouth every 6 (six) hours as needed for mild pain, Disp: 90 tablet, Rfl: 3    albuterol (PROVENTIL HFA,VENTOLIN HFA) 90 mcg/act inhaler, Inhale 2 puffs every 4 (four) hours as needed for wheezing, Disp: 1 Inhaler, Rfl: 5    cloZAPine (CLOZARIL) 25 mg tablet, Take 3 tablets (75 mg total) by mouth daily at bedtime (Patient not taking: Reported on 8/30/2019), Disp: 90 tablet, Rfl: 0    EPINEPHrine (EPIPEN JR) 0 15 mg/0 3 mL SOAJ, Inject 0 3 mL (0 15 mg total) into a muscle once as needed for anaphylaxis As needed for allergic reaction, Disp: 1 each, Rfl: 0    FLUoxetine (PROzac) 10 mg capsule, Take 1 capsule (10 mg total) by mouth daily, Disp: 30 capsule, Rfl: 0    levothyroxine 125 mcg tablet, Take 1 tablet (125 mcg total) by mouth daily in the early morning, Disp: 30 tablet, Rfl: 0    OXYGEN-HELIUM IN, Inhale 3 L , Disp: , Rfl:     pantoprazole (PROTONIX) 40 mg tablet, Take 1 tablet (40 mg total) by mouth daily in the early morning, Disp: 30 tablet, Rfl: 0    theophylline (JEF-24) 200 MG 24 hr capsule, Take 1 capsule (200 mg total) by mouth daily, Disp: 90 capsule, Rfl: 1  Justification if on more than two antipsychotics: not applicable  Side effects if any: none    Risks , benefits, side effects and precautions of medications discussed with patient and reminded patient to let us know any problems with medications     Objective:     Vital Signs:  Vitals:    03/05/20 2015 03/06/20 0616 03/06/20 0700 03/06/20 0705   BP: 95/62  124/69 104/51   BP Location: Left arm  Left arm Left arm   Pulse: 73  92 74   Resp: 20  18 18   Temp: (!) 97 4 °F (36 3 °C)  97 7 °F (36 5 °C)    TempSrc: Temporal  Temporal    SpO2: 92% 94% 91%    Weight:       Height:         Appearance:  age appropriate, casually dressed, disheveled and older than stated age well groomed with good eye contact today   Behavior:  deviant, evasive and guarded but suspicious at times   Speech:  delayed, increased latency of response and tangential    Mood:  anxious, euphoric and irritable    Affect:  increased in intensity, increased in range, mood-congruent and redirectable   Thought Process:  circumstantial, concrete, goal directed, illogical and perserverative with tendency to become stilted   Thought Content:  delusions  grandiose and persecutory still suspicious about staff tampering with her oxygen concentrator in inhalant, still psychosomatic about dying from choking or shortness of breath, no current suicidal homicidal thoughts and under plans reported  No other delusions elicited lately  No phobias obsessions or compulsions   Perceptual Disturbances: None    Risk Potential: Inability to care for herself and refusal to take showers regularly   Sensorium:  person, place, time/date, situation, day of week, month of year, year and time   Cognition:  grossly intact no language deficit, aware of current events, no deficit in recent or remote memory   Consciousness:  alert and awake    Attention: Fair   Intellect: Average   Insight:  Improve   Judgment: fair       Motor Activity: no abnormal movements         Recent Labs:  Results Reviewed     None          I/O Past 24 hours:  No intake/output data recorded  No intake/output data recorded  Assessment / Plan:     Schizoaffective disorder, bipolar type ([facility] [address] )  Overall status:  improving    Reason for continued inpatient care: to assess ability to maintain improvement by attending to ADLs and taking showers regular and see how she does on outing with family after another outing with staff escort next week  Acceptance by patient: accepting now  Hopefulness in recovery: living at the [facility]  Understanding of medications :  yes  Involved in reintegration process: attending groups and has off grounds and off unit privileges   Trusting in relatoinship with psychiatrist:  Marine Sanabria now  Overall status :  No changes  Recommended Treatment:    Medication changes:  1) none today    Non-pharmacological treatments  1) Continue with individual therapy, group therapy, milieu therapy and occupational therapy using recovery principles and psycho-education about accepting illness and need for treatment     2) encourage to take showers twice a week and cooperate with treatment and adl skills as per her behav  plan  3) another therapeutic pass with sister now that she did well today with the assigned staff escort to the [place]  4) Prepare for the [address] scheduled for next Wednesday as she already had the intake interview and for therapeutic pass with staff tomorrow  Safety  1) Safety/communication plan established targeting dynamic risk factors above  Discharge Plan back to a [facility] at [address] / Coordination of Care    Total floor / unit time spent today 15 minutes  Greater than 50% of total time was spent with the patient and / or family counseling and / or coordination of care  Receptive to listening and learning coping skills for management of symptoms and attending to ADLs  Patient's Rights, confidentiality and exceptions to confidentiality, use of automated medical record, [facility] staff access to medical record, and consent to treatment reviewed      Jeet Levine MD

## 2020-03-06 NOTE — PROGRESS NOTES
Progress Note - Pushpa Morgan 1955, 60 y o  female MRN: [id number]    Unit/Bed#: [facility] 110-02 Encounter: [id number]    Primary Care Provider: Amy Cameron PA-C   Date and time admitted to hospital: 7/23/2019  5:30 PM        * Schizoaffective disorder, bipolar type [facility])  Assessment & Plan  Patient was evaluated this morning before she went on a day trip with staff escort to the park  She had taken a shower was well groomed and pleasant and friendly  She is also excited about having an interview with the staff from the [address] [place] [address] who will take her out next week for a few hours tour  She is also hoping to go on a pass with her sister for a few hours sometime next week  She continues to stay on her bed most of the time but has been attending most of the groups including [facility] and has been compliant  She is taking showers twice a week and is taking medications  No side effects reported  Eating well and sleeping well  Still somewhat suspicious about certain staff tampering with her inhalant as well as her oxygen concentrator and still psychosomatic about dying from shortness of breath or talking etc   However she is receptive to verbal support and reassurance  Her affect is brighter she is friendly and pleasant and does not report any other overt delusional beliefs  Patient did go on the trip which was uneventful to a park but it was too cold and she had to come back after about half an hour because she had to go to the bathroom  She did not want to go elsewhere nor did she want to smoke according the staff who took her out today      Current medications:    Current Facility-Administered Medications:     [[facility] Hold - Suspended Admission] acetaminophen (TYLENOL) tablet 325 mg, 325 mg, Oral, Q6H PRN, Carissa Willis MD    [facility] Hold - Suspended Admission] acetaminophen (TYLENOL) tablet 650 mg, 650 mg, Oral, Q6H PRN, Carissa Willis MD    [facility] Hold - Suspended Admission] acetaminophen (TYLENOL) tablet 650 mg, 650 mg, Oral, Q8H PRN, Sindy Day MD    [facility] Hold - Suspended Admission] albuterol (PROVENTIL HFA,VENTOLIN HFA) inhaler 2 puff, 2 puff, Inhalation, Q4H PRN, Sindy Day MD, 2 puff at 10/11/19 0424    [[facility] Hold - Suspended Admission] aluminum-magnesium hydroxide-simethicone (MYLANTA) 200-200-20 mg/5 mL oral suspension 15 mL, 15 mL, Oral, Q4H PRN, Sindy Day MD, 15 mL at 01/26/20 1021    [[facility] Hold - Suspended Admission] ammonium lactate (LAC-HYDRIN) 12 % lotion 1 application, 1 application, Topical, BID PRN, Sindy Day MD    [facility] Hold - Suspended Admission] benzonatate (TESSALON PERLES) capsule 100 mg, 100 mg, Oral, TID PRN, Sindy Day MD    [facility] Hold - Suspended Admission] benztropine (COGENTIN) injection 1 mg, 1 mg, Intramuscular, Q8H PRN, Sindy Day MD    [facility] Hold - Suspended Admission] carbamide peroxide (DEBROX) 6 5 % otic solution 5 drop, 5 drop, Left Ear, BID PRN, Lena Dunbar MD    [facility] Hold - Suspended Admission] cloZAPine (CLOZARIL) tablet 25 mg, 25 mg, Oral, BID, Sindy Day MD, 25 mg at 03/05/20 2123    [[facility] Hold - Suspended Admission] cloZAPine (CLOZARIL) tablet 50 mg, 50 mg, Oral, BID, Sindy Day MD, 50 mg at 03/05/20 2124    [[facility] Hold - Suspended Admission] docusate sodium (COLACE) capsule 100 mg, 100 mg, Oral, BID PRN, Sindy Day MD    [facility] Hold - Suspended Admission] EPINEPHrine PF (ADRENALIN) 1 mg/mL injection 0 15 mg, 0 15 mg, Intramuscular, Once PRN, Sindy Day MD    [facility] Hold - Suspended Admission] fluticasone-vilanterol (BREO ELLIPTA) 200-25 MCG/INH inhaler 1 puff, 1 puff, Inhalation, Daily, Sindy Day MD, 1 puff at 03/06/20 0806    [[facility] Hold - Suspended Admission] ketotifen (ZADITOR) 0 025 % ophthalmic solution 1 drop, 1 drop, Right Eye, BID PRN, Sindy Day MD    [facility] Hold - Suspended Admission] levothyroxine tablet 125 mcg, 125 mcg, Oral, Early Morning, Sindy Day MD, 125 mcg at 03/06/20 0602    [[facility] magnesium hydroxide (MILK OF MAGNESIA) 400 mg/5 mL oral suspension 30 mL, 30 mL, Oral, Daily PRN, Deep Durand MD    [facility] Hold - Suspended Admission] montelukast (SINGULAIR) tablet 10 mg, 10 mg, Oral, HS, Deep Durand MD, 10 mg at 02/22/20 2104    [[facility] Hold - Suspended Admission] OLANZapine (ZyPREXA) IM injection 5 mg, 5 mg, Intramuscular, Q8H PRN, Deep Durand MD    [facility] Hold - Suspended Admission] OLANZapine (ZyPREXA) tablet 5 mg, 5 mg, Oral, Q8H PRN, Deep Durand MD    [facility] Hold - Suspended Admission] ondansetron (ZOFRAN-ODT) dispersible tablet 4 mg, 4 mg, Oral, Q6H PRN, Deep Durand MD, 4 mg at 12/09/19 1757    [[facility] Hold - Suspended Admission] pantoprazole (PROTONIX) EC tablet 40 mg, 40 mg, Oral, Early Morning, Deep Durand MD, 40 mg at 03/06/20 0602    [[facility] Hold - Suspended Admission] polyethylene glycol (MIRALAX) packet 17 g, 17 g, Oral, Daily PRN, Deep Durand MD    [facility] Hold - Suspended Admission] polyvinyl alcohol (LIQUIFILM TEARS) 1 4 % ophthalmic solution 1 drop, 1 drop, Both Eyes, Q3H PRN, Deep Durand MD    [facility] Hold - Suspended Admission] sertraline (ZOLOFT) tablet 175 mg, 175 mg, Oral, Daily, Deep Durand MD, 175 mg at 03/06/20 0806    [[facility] Hold - Suspended Admission] sucralfate (CARAFATE) oral suspension 1,000 mg, 1,000 mg, Oral, BID, Rosario Jay MD, 1,000 mg at 03/06/20 0602    [[facility] Hold - Suspended Admission] theophylline (JEF-24) 24 hr capsule 200 mg, 200 mg, Oral, Daily, Deep Durand MD, 200 mg at 03/06/20 0806    [[facility] Hold - Suspended Admission] tiotropium (SPIRIVA) capsule for inhaler 18 mcg, 18 mcg, Inhalation, Daily, Deep Durand MD, 18 mcg at 03/06/20 0806    [[facility] Hold - Suspended Admission] traZODone (DESYREL) tablet 25 mg, 25 mg, Oral, HS PRN, Deep Durand MD    Current Outpatient Medications:     acetaminophen (TYLENOL) 325 mg tablet, Take 2 tablets (650 mg total) by mouth every 6 (six) hours as needed for mild pain, Disp: 90 tablet, Rfl: 3    albuterol (PROVENTIL HFA,VENTOLIN HFA) 90 mcg/act inhaler, Inhale 2 puffs every 4 (four) hours as needed for wheezing, Disp: 1 Inhaler, Rfl: 5    cloZAPine (CLOZARIL) 25 mg tablet, Take 3 tablets (75 mg total) by mouth daily at bedtime (Patient not taking: Reported on 8/30/2019), Disp: 90 tablet, Rfl: 0    EPINEPHrine (EPIPEN JR) 0 15 mg/0 3 mL SOAJ, Inject 0 3 mL (0 15 mg total) into a muscle once as needed for anaphylaxis As needed for allergic reaction, Disp: 1 each, Rfl: 0    FLUoxetine (PROzac) 10 mg capsule, Take 1 capsule (10 mg total) by mouth daily, Disp: 30 capsule, Rfl: 0    levothyroxine 125 mcg tablet, Take 1 tablet (125 mcg total) by mouth daily in the early morning, Disp: 30 tablet, Rfl: 0    OXYGEN-HELIUM IN, Inhale 3 L , Disp: , Rfl:     pantoprazole (PROTONIX) 40 mg tablet, Take 1 tablet (40 mg total) by mouth daily in the early morning, Disp: 30 tablet, Rfl: 0    theophylline (JEF-24) 200 MG 24 hr capsule, Take 1 capsule (200 mg total) by mouth daily, Disp: 90 capsule, Rfl: 1  Justification if on more than two antipsychotics: not applicable  Side effects if any: none    Risks , benefits, side effects and precautions of medications discussed with patient and reminded patient to let us know any problems with medications     Objective:     Vital Signs:  Vitals:    03/05/20 2015 03/06/20 0616 03/06/20 0700 03/06/20 0705   BP: 95/62  124/69 104/51   BP Location: Left arm  Left arm Left arm   Pulse: 73  92 74   Resp: 20  18 18   Temp: (!) 97 4 °F (36 3 °C)  97 7 °F (36 5 °C)    TempSrc: Temporal  Temporal    SpO2: 92% 94% 91%    Weight:       Height:         Appearance:  age appropriate, casually dressed, disheveled and older than stated age well groomed with good eye contact today   Behavior:  deviant, evasive and guarded but suspicious at times   Speech:  delayed, increased latency of response and tangential    Mood:  anxious, euphoric and irritable    Affect:  increased in intensity, increased in range, mood-congruent and redirectable   Thought Process:  circumstantial, concrete, goal directed, illogical and perserverative with tendency to become stilted   Thought Content:  delusions  grandiose and persecutory still suspicious about staff tampering with her oxygen concentrator in inhalant, still psychosomatic about dying from choking or shortness of breath, no current suicidal homicidal thoughts and under plans reported  No other delusions elicited lately  No phobias obsessions or compulsions   Perceptual Disturbances: None    Risk Potential: Inability to care for herself and refusal to take showers regularly   Sensorium:  person, place, time/date, situation, day of week, month of year, year and time   Cognition:  grossly intact no language deficit, aware of current events, no deficit in recent or remote memory   Consciousness:  alert and awake    Attention: Fair   Intellect: Average   Insight:  Improve   Judgment: fair       Motor Activity: no abnormal movements         Recent Labs:  Results Reviewed     None          I/O Past 24 hours:  No intake/output data recorded  No intake/output data recorded          Assessment / Plan:     Schizoaffective disorder, bipolar type ([facility] )  Overall status:  improving    Reason for continued inpatient care: to assess ability to maintain improvement by attending to ADLs and taking showers regular and see how she does on outing with family after another outing with staff escort next week  Acceptance by patient: accepting now  Hopefulness in recovery: living at the [facility]  Understanding of medications :  yes  Involved in reintegration process: attending groups and has off grounds and off unit privileges   Trusting in relatoinship with psychiatrist:  Nicola Robledo now  Overall status :  No changes  Recommended Treatment:    Medication changes:  1) none today    Non-pharmacological treatments  1) Continue with individual therapy, group therapy, milieu therapy and occupational therapy using recovery principles and psycho-education about accepting illness and need for treatment   2) encourage to take showers twice a week and cooperate with treatment and adl skills as per her behav  plan  3) another therapeutic pass with sister now that she did well today with the assigned staff escort to the park  4) Prepare for the [place] [address] scheduled for next Wednesday as she already had the intake interview and for therapeutic pass with staff tomorrow  Safety  1) Safety/communication plan established targeting dynamic risk factors above  Discharge Plan back to a [facility] at [address] / Coordination of Care    Total floor / unit time spent today 15 minutes  Greater than 50% of total time was spent with the patient and / or family counseling and / or coordination of care  Receptive to listening and learning coping skills for management of symptoms and attending to ADLs  Patient's Rights, confidentiality and exceptions to confidentiality, use of automated medical record, [facility] staff access to medical record, and consent to treatment reviewed      Manuel Copeland MD

## 2020-03-07 NOTE — PLAN OF CARE
Problem: Alteration in Thoughts and Perception  Goal: Agree to be compliant with medication regime, as prescribed and report medication side effects  Description  Interventions:  - Offer appropriate PRN medication and supervise ingestion; conduct aims, as needed   Outcome: Progressing     Problem: Nutrition/Hydration-ADULT  Goal: Nutrient/Hydration intake appropriate for improving, restoring or maintaining nutritional needs  Description  Monitor and assess patient's nutrition/hydration status for malnutrition  Collaborate with interdisciplinary team and initiate plan and interventions as ordered  Monitor patient's weight and dietary intake as ordered or per policy  Utilize nutrition screening tool and intervene as necessary  Determine patient's food preferences and provide high-protein, high-caloric foods as appropriate  INTERVENTIONS:  - Monitor oral intake, urinary output, labs, and treatment plans  - Assess nutrition and hydration status and recommend course of action  - Evaluate amount of meals eaten  - Assist patient with eating if necessary   - Allow adequate time for meals  - Recommend/ encourage appropriate diets, oral nutritional supplements, and vitamin/mineral supplements  - Order, calculate, and assess calorie counts as needed  - Recommend, monitor, and adjust tube feedings and TPN/PPN based on assessed needs  - Assess need for intravenous fluids  - Provide specific nutrition/hydration education as appropriate  - Include patient/family/caregiver in decisions related to nutrition  Outcome: Progressing     Problem: Depression  Goal: Refrain from isolation  Description  Interventions:  - Develop a trusting relationship   - Encourage socialization   Outcome: Not Progressing     [address] Moreno relates that she was pleased w/pass to [facility] today, & enjoyed sitting outside getting fresh air, felt accomplished that had done all necessary to make pass transpire   She came out for meal, ate 100% plus drank an Ensure over the course of 45'  Came up for supper medicine  She has otherwise been isolating in bed

## 2020-03-07 NOTE — PROGRESS NOTES
[address] Natalie Lopes did not attend the Movie Social as was soundly asleep in bed  "I'm so relaxed from the fresh air in the park today " Was prompted awake & was willing to do her Incentive Spirometry, achieving volumes of 1100ml tonight  Had an HS snack, came up for her HS medicine except the Singulair  Wearing now her QHS humidified nasal O2 @ 1L for bed

## 2020-03-07 NOTE — PLAN OF CARE
Problem: Alteration in Thoughts and Perception  Goal: Verbalize thoughts and feelings  Description  Interventions:  - Promote a nonjudgmental and trusting relationship with the patient through active listening and therapeutic communication  - Assess patient's level of functioning, behavior and potential for risk  - Engage patient in 1 on 1 interactions for a minimum of 15 minutes each session  - Encourage patient to express fears, feelings, frustrations, and discuss symptoms    - [place] patient to reality, help patient recognize reality-based thinking   - Administer medications as ordered and assess for potential side effects  - Provide the patient education related to the signs and symptoms of the illness and desired effects of prescribed medications  Outcome: Progressing  Goal: Agree to be compliant with medication regime, as prescribed and report medication side effects  Description  Interventions:  - Offer appropriate PRN medication and supervise ingestion; conduct aims, as needed   Outcome: Progressing  Goal: Recognize dysfunctional thoughts, communicate reality-based thoughts at the time of discharge  Description  Interventions:  - Provide medication and psycho-education to assist patient in compliance and developing insight into his/her illness   Outcome: Progressing     Problem: Ineffective Coping  Goal: Patient/Family verbalizes awareness of resources  Outcome: Progressing  Goal: Understands least restrictive measures  Description  Interventions:  - Utilize least restrictive behavior  Outcome: Progressing     Problem: Risk for Self Injury/Neglect  Goal: Treatment Goal: Remain safe during length of stay, learn and adopt new coping skills, and be free of self-injurious ideation, impulses and acts at the time of discharge  Outcome: Progressing  Goal: Verbalize thoughts and feelings  Description  Interventions:  - Assess and re-assess patient's lethality and potential for self-injury  - Engage patient in 1:1 interactions, daily, for a minimum of 15 minutes  - Encourage patient to express feelings, fears, frustrations, hopes  - Establish rapport/trust with patient   Outcome: Progressing  Goal: Refrain from harming self  Description  Interventions:  - Monitor patient closely, per order  - Develop a trusting relationship  - Supervise medication ingestion, monitor effects and side effects   Outcome: Progressing     Problem: Depression  Goal: Verbalize thoughts and feelings  Description  Interventions:  - Assess and re-assess patient's level of risk   - Engage patient in 1:1 interactions, daily, for a minimum of 15 minutes   - Encourage patient to express feelings, fears, frustrations, hopes   Outcome: Progressing     Problem: Anxiety  Goal: Anxiety is at manageable level  Description  Interventions:  - Assess and monitor patient's anxiety level  - Monitor for signs and symptoms of anxiety both physical and emotional (heart palpitations, chest pain, shortness of breath, headaches, nausea, feeling jumpy, restlessness, irritable, apprehensive)  - Collaborate with interdisciplinary team and initiate plan and interventions as ordered    - [place] patient to unit/surroundings  - Explain treatment plan  - Encourage participation in care  - Encourage verbalization of concerns/fears  - Identify coping mechanisms  - Assist in developing anxiety-reducing skills  - Administer/offer alternative therapies  - Limit or eliminate stimulants  Outcome: Progressing     Problem: Alteration in Orientation  Goal: Interact with staff daily  Description  Interventions:  - Assess and re-assess patient's level of orientation  - Engage patient in 1 on 1 interactions, daily, for a minimum of 15 minutes   - Establish rapport/trust with patient   Outcome: Progressing     Problem: PAIN - ADULT  Goal: Verbalizes/displays adequate comfort level or baseline comfort level  Description  Interventions:  - Encourage patient to monitor pain and request assistance  - Assess pain using appropriate pain scale  - Administer analgesics based on type and severity of pain and evaluate response  - Implement non-pharmacological measures as appropriate and evaluate response  - Consider cultural and social influences on pain and pain management  - Notify physician/advanced practitioner if interventions unsuccessful or patient reports new pain  Outcome: Progressing     Problem: Nutrition/Hydration-ADULT  Goal: Nutrient/Hydration intake appropriate for improving, restoring or maintaining nutritional needs  Description  Monitor and assess patient's nutrition/hydration status for malnutrition  Collaborate with interdisciplinary team and initiate plan and interventions as ordered  Monitor patient's weight and dietary intake as ordered or per policy  Utilize nutrition screening tool and intervene as necessary  Determine patient's food preferences and provide high-protein, high-caloric foods as appropriate       INTERVENTIONS:  - Monitor oral intake, urinary output, labs, and treatment plans  - Assess nutrition and hydration status and recommend course of action  - Evaluate amount of meals eaten  - Assist patient with eating if necessary   - Allow adequate time for meals  - Recommend/ encourage appropriate diets, oral nutritional supplements, and vitamin/mineral supplements  - Order, calculate, and assess calorie counts as needed  - Recommend, monitor, and adjust tube feedings and TPN/PPN based on assessed needs  - Assess need for intravenous fluids  - Provide specific nutrition/hydration education as appropriate  - Include patient/family/caregiver in decisions related to nutrition  Outcome: Progressing     Problem: Alteration in Thoughts and Perception  Goal: Attend and participate in unit activities, including therapeutic, recreational, and educational groups  Description  Interventions:  - Provide therapeutic and educational activities daily, encourage attendance and participation, and document same in the medical record     CERTIFIED PEER SPECIALIST INTERVENTIONS:    Complete peer assessment with patient to assess their needs and identify their goals to complete while in the recovery program as well as once discharged into the community  Patient will complete WRAP Plan, Crisis Plan and [facility] [employer]  Patient will attend 50% of groups offered on the unit  Patient will complete a goal card weekly  Outcome: Not Progressing     Problem: Ineffective Coping  Goal: Participates in unit activities  Description  Interventions:  - Provide therapeutic environment   - Provide required programming   - Redirect inappropriate behaviors   Outcome: Not Progressing     Problem: Risk for Self Injury/Neglect  Goal: Attend and participate in unit activities, including therapeutic, recreational, and educational groups  Description  Interventions:  - Provide therapeutic and educational activities daily, encourage attendance and participation, and document same in the medical record  - Obtain collateral information, encourage visitation and family involvement in care   Outcome: Not Progressing     Problem: Depression  Goal: Refrain from isolation  Description  Interventions:  - Develop a trusting relationship   - Encourage socialization   Outcome: Not Progressing   Mostly isolative to her bed, coming out of her room for meds and meals only  Napped for most of the day and did not attended any groups  Minimally interacted with others  Did not shower or do hygiene and looks disheveled  Ate 50% of breakfast and 25% of lunch  No other behaviors or issues noted  No somatic complaints voiced  Continue to monitor

## 2020-03-07 NOTE — PROGRESS NOTES
Progress Note - Behavioral Health     Jose Cardenas 60 y o  female MRN: [id number]   Unit/Bed#: [facility] 110-02 Encounter: [id number]        Danie Steiner was seen today for continuation of care, records reviewed and patient was discussed with her nurse  Per report Eliz Leone spends most of her time in her room lying in bed staring at the wall and has limited participation with groups or with peers  She did have a day pass yesterday with staff and went to the park she reported this as relaxing and enjoying    She continues to be somatically preoccupied per staff with swallowing and fear of choking and she will only shower 1 time weekly with specific staff member and tech present  Upon approach today Eliz Leone is found lying in bed staring at the wall, she denies suicidal homicidal ideation, she denies auditory or visual hallucinations, she denies somatic symptoms to this provider today  She smiles and engages with the provider stating that she is very sleepy    She denies difficulty sleeping throughout the night she denies nightmares  She reports that her appetite has been good and she states that she enjoyed her adding yesterday  She continues to use 1 L of oxygen overnight during sleep  Patient reported this per times weekly, she denies side effects to her medications      Sleep: normal  Appetite: normal  Medication side effects: No   ROS: no complaints, all other systems are negative    Mental Status Evaluation:    Appearance:  casually dressed, dressed appropriately, adequate grooming   Behavior:  pleasant, cooperative, calm, good eye contact   Speech:  normal rate, normal volume, normal pitch   Mood:  less anxious, less depressed   Affect:  Bright and smiling at provider   Thought Process:  circumstantial   Associations: circumstantial associations   Thought Content:  Denies delusional thought content to provider today though per staff continues to report some persecutory delusions and somatic complaints/delusions Perceptual Disturbances: no auditory hallucinations, no visual hallucinations   Risk Potential: Suicidal ideation - None  Homicidal ideation - None  Potential for aggression - No   Sensorium:  oriented to person, place and time/date   Memory:  recent memory intact, remote memory intact   Consciousness:  alert and awake   Attention: attention span and concentration are age appropriate   Insight:  partial   Judgment: fair   Gait/Station: in bed, unable to assess   Motor Activity: no abnormal movements     Vital signs in last 24 hours:    Temp:  [97 5 °F (36 4 °C)-97 6 °F (36 4 °C)] 97 5 °F (36 4 °C)  HR:  [81-92] 81  Resp:  [16-20] 20  BP: (108-109)/(56-67) 109/67    Laboratory results:  No new labs review today    Suicide/Homicide Risk Assessment:    Risk of Harm to Self:   Nursing Suicide Risk Assessment Last 24 hours: Low Risk to Self: No evidence of suicidal thoughts or history; Moderate Risk to Self: Age 48 or older ;      Risk of Harm to Others:  Nursing Homicide Risk Assessment: Violence Risk to Others: Denies within past 6 months    The following interventions are recommended: behavioral checks every 15 minutes, continued hospitalization on locked unit  Patient has a day pass with staff to leave grounds    Progress Toward Goals: No significant change    Assessment/Plan   Principal Problem:    Schizoaffective disorder, bipolar type (HCC)  Active Problems:    COPD with asthma ([facility] [address] )    Acquired hypothyroidism    Gastroesophageal reflux disease without esophagitis    At risk for aspiration    Recommended Treatment:     Planned medication and treatment changes:     All current active medications have been reviewed  Encourage group therapy, milieu therapy and occupational therapy  Behavioral Health checks every 15 minutes  Patient has a past to leave grounds with staff  Staff to encourage hygiene and showering  Status of Admission Status of Admission  Status of Admission: Other (Comment)(305)  Continue current medications:    Current Facility-Administered Medications:  acetaminophen 325 mg Oral Q6H PRN Amina Aparicio MD   acetaminophen 650 mg Oral Q6H PRN Amina Aparicio MD   acetaminophen 650 mg Oral Q8H PRN Amina Aparicio MD   albuterol 2 puff Inhalation Q4H PRN Amina Aparicio MD   aluminum-magnesium hydroxide-simethicone 15 mL Oral Q4H PRN Amina Aparicio MD   ammonium lactate 1 application Topical BID PRN Amina Aparicio MD   benzonatate 100 mg Oral TID PRN Amina Aparicio MD   benztropine 1 mg Intramuscular Q8H PRN Amina Aparicio MD   carbamide peroxide 5 drop Left Ear BID PRN Castro Grant MD   cloZAPine 25 mg Oral BID Amina Aparicio MD   cloZAPine 50 mg Oral BID Amina Aparicio MD   docusate sodium 100 mg Oral BID PRN Amina Aparicio MD   EPINEPHrine PF 0 15 mg Intramuscular Once PRN Amina Aparicio MD   fluticasone-vilanterol 1 puff Inhalation Daily Amina Aparicio MD   ketotifen 1 drop Right Eye BID PRN Amina Aparicio MD   levothyroxine 125 mcg Oral Early Morning Amina Aparicio MD   magnesium hydroxide 30 mL Oral Daily PRN Amina Aparicio MD   montelukast 10 mg Oral HS Amina Aparicio MD   OLANZapine 5 mg Intramuscular Q8H PRN Amina Aparicio MD   OLANZapine 5 mg Oral Q8H PRN Amina Aparicio MD   ondansetron 4 mg Oral Q6H PRN Amina Aparicio MD   pantoprazole 40 mg Oral Early Morning Amina Aparicio MD   polyethylene glycol 17 g Oral Daily PRN Amina Aparicio MD   polyvinyl alcohol 1 drop Both Eyes Q3H PRN Amina Aparicio MD   sertraline 175 mg Oral Daily Amina Aparicio MD   sucralfate 1,000 mg Oral BID Florence Rendon MD   theophylline 200 mg Oral Daily Amina Aparicio MD   tiotropium 18 mcg Inhalation Daily Amina Aparicio MD   traZODone 25 mg Oral HS PRN Amina Aparicio MD       Risks / Benefits of Treatment:    Risks, benefits, and possible side effects of medications explained to patient and patient verbalizes understanding and agreement for treatment  Counseling / Coordination of Care:    Patient's progress reviewed with nursing staff    Medications, treatment progress and treatment plan reviewed with patient

## 2020-03-08 NOTE — PLAN OF CARE
Problem: Alteration in Thoughts and Perception  Goal: Treatment Goal: Gain control of psychotic behaviors/thinking, reduce/eliminate presenting symptoms and demonstrate improved reality functioning upon discharge  Outcome: Progressing  Goal: Verbalize thoughts and feelings  Description  Interventions:  - Promote a nonjudgmental and trusting relationship with the patient through active listening and therapeutic communication  - Assess patient's level of functioning, behavior and potential for risk  - Engage patient in 1 on 1 interactions for a minimum of 15 minutes each session  - Encourage patient to express fears, feelings, frustrations, and discuss symptoms    - [place] patient to reality, help patient recognize reality-based thinking   - Administer medications as ordered and assess for potential side effects  - Provide the patient education related to the signs and symptoms of the illness and desired effects of prescribed medications  Outcome: Progressing  Goal: Agree to be compliant with medication regime, as prescribed and report medication side effects  Description  Interventions:  - Offer appropriate PRN medication and supervise ingestion; conduct aims, as needed   Outcome: Progressing  Goal: Recognize dysfunctional thoughts, communicate reality-based thoughts at the time of discharge  Description  Interventions:  - Provide medication and psycho-education to assist patient in compliance and developing insight into his/her illness   Outcome: Progressing     Problem: Ineffective Coping  Goal: Patient/Family verbalizes awareness of resources  Outcome: Progressing  Goal: Understands least restrictive measures  Description  Interventions:  - Utilize least restrictive behavior  Outcome: Progressing     Problem: Risk for Self Injury/Neglect  Goal: Treatment Goal: Remain safe during length of stay, learn and adopt new coping skills, and be free of self-injurious ideation, impulses and acts at the time of discharge  Outcome: Progressing  Goal: Verbalize thoughts and feelings  Description  Interventions:  - Assess and re-assess patient's lethality and potential for self-injury  - Engage patient in 1:1 interactions, daily, for a minimum of 15 minutes  - Encourage patient to express feelings, fears, frustrations, hopes  - Establish rapport/trust with patient   Outcome: Progressing  Goal: Refrain from harming self  Description  Interventions:  - Monitor patient closely, per order  - Develop a trusting relationship  - Supervise medication ingestion, monitor effects and side effects   Outcome: Progressing     Problem: Depression  Goal: Verbalize thoughts and feelings  Description  Interventions:  - Assess and re-assess patient's level of risk   - Engage patient in 1:1 interactions, daily, for a minimum of 15 minutes   - Encourage patient to express feelings, fears, frustrations, hopes   Outcome: Progressing     Problem: Anxiety  Goal: Anxiety is at manageable level  Description  Interventions:  - Assess and monitor patient's anxiety level  - Monitor for signs and symptoms of anxiety both physical and emotional (heart palpitations, chest pain, shortness of breath, headaches, nausea, feeling jumpy, restlessness, irritable, apprehensive)  - Collaborate with interdisciplinary team and initiate plan and interventions as ordered    - [place] patient to unit/surroundings  - Explain treatment plan  - Encourage participation in care  - Encourage verbalization of concerns/fears  - Identify coping mechanisms  - Assist in developing anxiety-reducing skills  - Administer/offer alternative therapies  - Limit or eliminate stimulants  Outcome: Progressing     Problem: Alteration in Orientation  Goal: Interact with staff daily  Description  Interventions:  - Assess and re-assess patient's level of orientation  - Engage patient in 1 on 1 interactions, daily, for a minimum of 15 minutes   - Establish rapport/trust with patient   Outcome: Progressing     Problem: PAIN - ADULT  Goal: Verbalizes/displays adequate comfort level or baseline comfort level  Description  Interventions:  - Encourage patient to monitor pain and request assistance  - Assess pain using appropriate pain scale  - Administer analgesics based on type and severity of pain and evaluate response  - Implement non-pharmacological measures as appropriate and evaluate response  - Consider cultural and social influences on pain and pain management  - Notify physician/advanced practitioner if interventions unsuccessful or patient reports new pain  Outcome: Progressing     Problem: SAFETY ADULT  Goal: Patient will remain free of falls  Description  INTERVENTIONS:  - Assess patient frequently for physical needs  -  Identify cognitive and physical deficits and behaviors that affect risk of falls  -  [place] fall precautions as indicated by assessment   - Educate patient/family on patient safety including physical limitations  - Instruct patient to call for assistance with activity based on assessment  - Modify environment to reduce risk of injury  - Consider OT/PT consult to assist with strengthening/mobility  Outcome: Progressing     Problem: Nutrition/Hydration-ADULT  Goal: Nutrient/Hydration intake appropriate for improving, restoring or maintaining nutritional needs  Description  Monitor and assess patient's nutrition/hydration status for malnutrition  Collaborate with interdisciplinary team and initiate plan and interventions as ordered  Monitor patient's weight and dietary intake as ordered or per policy  Utilize nutrition screening tool and intervene as necessary  Determine patient's food preferences and provide high-protein, high-caloric foods as appropriate       INTERVENTIONS:  - Monitor oral intake, urinary output, labs, and treatment plans  - Assess nutrition and hydration status and recommend course of action  - Evaluate amount of meals eaten  - Assist patient with eating if necessary   - Allow adequate time for meals  - Recommend/ encourage appropriate diets, oral nutritional supplements, and vitamin/mineral supplements  - Order, calculate, and assess calorie counts as needed  - Recommend, monitor, and adjust tube feedings and TPN/PPN based on assessed needs  - Assess need for intravenous fluids  - Provide specific nutrition/hydration education as appropriate  - Include patient/family/caregiver in decisions related to nutrition  Outcome: Progressing     Problem: Alteration in Thoughts and Perception  Goal: Attend and participate in unit activities, including therapeutic, recreational, and educational groups  Description  Interventions:  - Provide therapeutic and educational activities daily, encourage attendance and participation, and document same in the medical record     CERTIFIED PEER SPECIALIST INTERVENTIONS:    Complete peer assessment with patient to assess their needs and identify their goals to complete while in the recovery program as well as once discharged into the community  Patient will complete WRAP Plan, Crisis Plan and 5 Life Domains  Patient will attend 50% of groups offered on the unit  Patient will complete a goal card weekly      Outcome: Not Progressing  Goal: Complete daily ADLs, including personal hygiene independently, as able  Description  Interventions:  - Observe, teach, and assist patient with ADLS  - Monitor and promote a balance of rest/activity, with adequate nutrition and elimination   Outcome: Not Progressing     Problem: Ineffective Coping  Goal: Participates in unit activities  Description  Interventions:  - Provide therapeutic environment   - Provide required programming   - Redirect inappropriate behaviors   Outcome: Not Progressing     Problem: Risk for Self Injury/Neglect  Goal: Attend and participate in unit activities, including therapeutic, recreational, and educational groups  Description  Interventions:  - Provide therapeutic and educational activities daily, encourage attendance and participation, and document same in the medical record  - Obtain collateral information, encourage visitation and family involvement in care   Outcome: Not Progressing  Goal: Complete daily ADLs, including personal hygiene independently, as able  Description  Interventions:  - Observe, teach, and assist patient with ADLS  - Monitor and promote a balance of rest/activity, with adequate nutrition and elimination  Outcome: Not Progressing     Problem: Depression  Goal: Treatment Goal: Demonstrate behavioral control of depressive symptoms, verbalize feelings of improved mood/affect, and adopt new coping skills prior to discharge  Outcome: Not Progressing  Goal: Refrain from isolation  Description  Interventions:  - Develop a trusting relationship   - Encourage socialization   Outcome: Not Progressing  Goal: Refrain from self-neglect  Outcome: Not Progressing   Mostly isolative to her bed, coming out of her room for meds and meals only  Napped for most of the day and did not attended any groups  Minimally interacted with others  Did not shower or do hygiene and looks disheveled  Ate 100% of breakfast and 25% of lunch  No other behaviors or issues noted  No somatic complaints voiced  Continue to monitor

## 2020-03-08 NOTE — PLAN OF CARE
Problem: Alteration in Thoughts and Perception  Goal: Verbalize thoughts and feelings  Description  Interventions:  - Promote a nonjudgmental and trusting relationship with the patient through active listening and therapeutic communication  - Assess patient's level of functioning, behavior and potential for risk  - Engage patient in 1 on 1 interactions for a minimum of 15 minutes each session  - Encourage patient to express fears, feelings, frustrations, and discuss symptoms    - [place] patient to reality, help patient recognize reality-based thinking   - Administer medications as ordered and assess for potential side effects  - Provide the patient education related to the signs and symptoms of the illness and desired effects of prescribed medications  Outcome: Progressing  Goal: Complete daily ADLs, including personal hygiene independently, as able  Description  Interventions:  - Observe, teach, and assist patient with ADLS  - Monitor and promote a balance of rest/activity, with adequate nutrition and elimination   Outcome: Progressing     Problem: Risk for Self Injury/Neglect  Goal: Verbalize thoughts and feelings  Description  Interventions:  - Assess and re-assess patient's lethality and potential for self-injury  - Engage patient in 1:1 interactions, daily, for a minimum of 15 minutes  - Encourage patient to express feelings, fears, frustrations, hopes  - Establish rapport/trust with patient   Outcome: Progressing     Problem: Alteration in Orientation  Goal: Interact with staff daily  Description  Interventions:  - Assess and re-assess patient's level of orientation  - Engage patient in 1 on 1 interactions, daily, for a minimum of 15 minutes   - Establish rapport/trust with patient   Outcome: Progressing     Problem: Alteration in Thoughts and Perception  Goal: Attend and participate in unit activities, including therapeutic, recreational, and educational groups  Description  Interventions:  - Provide therapeutic and educational activities daily, encourage attendance and participation, and document same in the medical record     CERTIFIED PEER SPECIALIST INTERVENTIONS:    Complete peer assessment with patient to assess their needs and identify their goals to complete while in the recovery program as well as once discharged into the community  Patient will complete WRAP Plan, Crisis Plan and 5 Life Domains  Patient will attend 50% of groups offered on the unit  Patient will complete a goal card weekly  Outcome: Not Progressing     Problem: Depression  Goal: Refrain from isolation  Description  Interventions:  - Develop a trusting relationship   - Encourage socialization   Outcome: Not Ani Femi has been in her room most of the shift  Minimal interaction with peers and staff  Able to make needs known to staff  She has not been somatic  No complaints  Napping at times or else she is just laying in bed  Prompted a couple times to come for medication  Ate 75% of her meal and drank an Ensure  Back to bed after eating  BP was low so she drank water after encouragement by staff  BP increased after doing so  Did incentive spirometer  She was able to achieve 1100ml's  Did not attend evening group  Took HS medication and ate snack  No mention of any difficulty swallowing  Oxygen saturation 91% on room air prior to oxygen 1 liter via nasal cannula applied  Continue to monitor  Precautions maintained

## 2020-03-08 NOTE — PROGRESS NOTES
Tu Bautista has been sleeping since the start of the shift  Oxygen on at 1 liter via nasal cannula  No respiratory distress  Continue to monitor  Precautions maintained

## 2020-03-08 NOTE — PROGRESS NOTES
Dr. Page- Attempted to contact pt with no answer. Pt requesting med that was d/c'd by you 3/17. Not sure if you would like to discuss with pt. Please refill as you see fit.    Progress Note - Behavioral Health     Nam Camacho 60 y o  female MRN: [id number]   [facility] Encounter: [id number]    Behavior over the last 24 hours: unchanged  Natalie Lopes was seen today for continuation of care, records reviewed and patient was discussed with nursing team   Per report, patient has been eating well for breakfast and 25% for lunch, sleeping well, not attending groups, minimally interacting with peers on the unit  The patient has no concerns overnight  Today, Natalie Lopes is pleasant, cooperative upon approach though she is again found in her room lying in bed, she has good eye contact, has normal regular speech and a linear and logical thought process  She reports no auditory hallucinations, no visual hallucinations, no suicidal ideation, no homicidal ideation, and no delusional thinking  She did not shower or complete any hygiene and she appears disheveled  She is not voicing any somatic complaints, she rates depression and anxiety both at a 5/10, 10 being the worst and states she just feels like resting in bed      Sleep: normal  Appetite: normal  Medication side effects: No   ROS: no complaints, all other systems are negative    Mental Status Evaluation:    Appearance:  disheveled   Behavior:  pleasant, cooperative, calm, good eye contact   Speech:  normal rate, normal volume, normal pitch   Mood:  slightly anxious, slightly depressed   Affect:  slightly brighter   Thought Process:  circumstantial   Associations: circumstantial associations   Thought Content:  no overt delusions   Perceptual Disturbances: no auditory hallucinations, no visual hallucinations   Risk Potential: Suicidal ideation - None  Homicidal ideation - None  Potential for aggression - No   Sensorium:  oriented to person, place and time/date   Memory:  recent memory intact, remote memory intact   Consciousness:  alert and awake   Attention: attention span and concentration are age appropriate   Insight: partial   Judgment: fair   Gait/Station: in bed, unable to assess   Motor Activity: no abnormal movements     Vital signs in last 24 hours:    Temp:  [97 4 °F (36 3 °C)-98 1 °F (36 7 °C)] 97 8 °F (36 6 °C)  HR:  [57-93] 65  Resp:  [14-20] 14  BP: ()/(56-66) 88/56    Laboratory results:  No new labs   Suicide/Homicide Risk Assessment:    Risk of Harm to Self:   Nursing Suicide Risk Assessment Last 24 hours:  ; Moderate Risk to Self: Age 48 or older ;      Risk of Harm to Others:  Nursing Homicide Risk Assessment: Violence Risk to Others: Denies within past 6 months    The following interventions are recommended: behavioral checks every 15 minutes, continued hospitalization on locked unit  Patient has day pass with staff to leave grounds    Progress Toward Goals: Unchanged    Assessment/Plan   Principal Problem:    Schizoaffective disorder, bipolar type ([facility] )  Active Problems:    COPD with asthma ([facility] )    Acquired hypothyroidism    Gastroesophageal reflux disease without esophagitis    At risk for aspiration    Recommended Treatment:     Planned medication and treatment changes:     All current active medications have been reviewed  Encourage group therapy, milieu therapy and occupational therapy  Behavioral Health checks every 15 minutes  Status of Admission Status of Admission  Status of Admission: Other (Comment)([id number])  Continue current medications:    Current Facility-Administered Medications:  acetaminophen 325 mg Oral Q6H PRN Sindy Day MD   acetaminophen 650 mg Oral Q6H PRN Sindy Day MD   acetaminophen 650 mg Oral Q8H PRN Sindy Day MD   albuterol 2 puff Inhalation Q4H PRN Sindy Day MD   aluminum-magnesium hydroxide-simethicone 15 mL Oral Q4H PRN Sindy Day MD   ammonium lactate 1 application Topical BID PRN Sindy Day MD   benzonatate 100 mg Oral TID PRN Sindy Day MD   benztropine 1 mg Intramuscular Q8H PRN Sindy Day MD   carbamide peroxide 5 drop Left Ear BID PRN Lena Dunbar MD cloZAPine 25 mg Oral BID Jennifer Taveras MD   cloZAPine 50 mg Oral BID Jennifer Taveras MD   docusate sodium 100 mg Oral BID PRN Jennifer Taveras MD   EPINEPHrine PF 0 15 mg Intramuscular Once PRN Jennifer Taveras MD   fluticasone-vilanterol 1 puff Inhalation Daily Jennifer Taveras MD   ketotifen 1 drop Right Eye BID PRN Jennifer Taveras MD   levothyroxine 125 mcg Oral Early Morning Jennifer Taveras MD   magnesium hydroxide 30 mL Oral Daily PRN Jennifer Taveras MD   montelukast 10 mg Oral HS Jennifer Taveras MD   OLANZapine 5 mg Intramuscular Q8H PRN Jennifer Taveras MD   OLANZapine 5 mg Oral Q8H PRN Jennifer Taveras MD   ondansetron 4 mg Oral Q6H PRN Jennifer Taveras MD   pantoprazole 40 mg Oral Early Morning Jennifer Taveras MD   polyethylene glycol 17 g Oral Daily PRN Jennifer Taveras MD   polyvinyl alcohol 1 drop Both Eyes Q3H PRN Jennifer Taveras MD   sertraline 175 mg Oral Daily Jennifer Taveras MD   sucralfate 1,000 mg Oral BID Fletcher Nevarez MD   theophylline 200 mg Oral Daily Jennifer Taveras MD   tiotropium 18 mcg Inhalation Daily Jennifer Taveras MD   traZODone 25 mg Oral HS PRN Jennifer Taveras MD       Risks / Benefits of Treatment:    Risks, benefits, and possible side effects of medications explained to patient  Patient has limited understanding of risks and benefits of treatment at this time, but agrees to take medications as prescribed  Counseling / Coordination of Care:    Patient's progress reviewed with nursing staff  Medications, treatment progress and treatment plan reviewed with patient  Supportive counseling provided to the patient

## 2020-03-08 NOTE — PLAN OF CARE
Problem: Alteration in Thoughts and Perception  Goal: Verbalize thoughts and feelings  Description  Interventions:  - Promote a nonjudgmental and trusting relationship with the patient through active listening and therapeutic communication  - Assess patient's level of functioning, behavior and potential for risk  - Engage patient in 1 on 1 interactions for a minimum of 15 minutes each session  - Encourage patient to express fears, feelings, frustrations, and discuss symptoms    - [place] patient to reality, help patient recognize reality-based thinking   - Administer medications as ordered and assess for potential side effects  - Provide the patient education related to the signs and symptoms of the illness and desired effects of prescribed medications  Outcome: Progressing  Goal: Agree to be compliant with medication regime, as prescribed and report medication side effects  Description  Interventions:  - Offer appropriate PRN medication and supervise ingestion; conduct aims, as needed   Outcome: Progressing     Problem: Alteration in Orientation  Goal: Interact with staff daily  Description  Interventions:  - Assess and re-assess patient's level of orientation  - Engage patient in 1 on 1 interactions, daily, for a minimum of 15 minutes   - Establish rapport/trust with patient   Outcome: Progressing     Problem: SAFETY ADULT  Goal: Patient will remain free of falls  Description  INTERVENTIONS:  - Assess patient frequently for physical needs  -  Identify cognitive and physical deficits and behaviors that affect risk of falls    -  [place] fall precautions as indicated by assessment   - Educate patient/family on patient safety including physical limitations  - Instruct patient to call for assistance with activity based on assessment  - Modify environment to reduce risk of injury  - Consider OT/PT consult to assist with strengthening/mobility  Outcome: Progressing     Problem: Alteration in Thoughts and Perception  Goal: Attend and participate in unit activities, including therapeutic, recreational, and educational groups  Description  Interventions:  - Provide therapeutic and educational activities daily, encourage attendance and participation, and document same in the medical record     CERTIFIED PEER SPECIALIST INTERVENTIONS:    Complete peer assessment with patient to assess their needs and identify their goals to complete while in the recovery program as well as once discharged into the community  Patient will complete WRAP Plan, Crisis Plan and 5 Life Domains  Patient will attend 50% of groups offered on the unit  Patient will complete a goal card weekly  Outcome: Not Progressing  Goal: Complete daily ADLs, including personal hygiene independently, as able  Description  Interventions:  - Observe, teach, and assist patient with ADLS  - Monitor and promote a balance of rest/activity, with adequate nutrition and elimination   Outcome: Not Progressing     Problem: Depression  Goal: Refrain from self-neglect  Outcome: Not Isaias Prater was sitting on her bed at the beginning of the shift  She came out and sat by the phone and then proceeded to make a call  Prompted for medication  Swallowed liquid and pills without difficulty  No complaints  BP was 88/56  She was encouraged to drink water and move around  Incentive spirometer with staff encouragement  She achieved 1100 ml's  Tolerated well  BP then 116/78  No complaint of dizziness  No shower or BM today  Took HS medication  Ate snack  Oxygen saturation 93% on room air prior to oxygen 1 liter via nasal cannula being applied  Continue to monitor  Precautions maintained

## 2020-03-09 NOTE — PROGRESS NOTES
03/09/20 0800   Team Meeting   Meeting Type Daily Rounds   Team Members Present   Team Members Present Physician;Nurse;; Other (Discipline and Name)   Physician Team Member Dr Francis Franco Team Member Lu Young RN   Care Management Team Member Brenda Hemphill   Other (Discipline and Name) Anthony Colindres LCSW     Patient has no issues during her trip to the park last Friday  No issues over the weekend  Slept

## 2020-03-09 NOTE — PLAN OF CARE
Problem: Alteration in Thoughts and Perception  Goal: Verbalize thoughts and feelings  Description  Interventions:  - Promote a nonjudgmental and trusting relationship with the patient through active listening and therapeutic communication  - Assess patient's level of functioning, behavior and potential for risk  - Engage patient in 1 on 1 interactions for a minimum of 15 minutes each session  - Encourage patient to express fears, feelings, frustrations, and discuss symptoms    - [place] patient to reality, help patient recognize reality-based thinking   - Administer medications as ordered and assess for potential side effects  - Provide the patient education related to the signs and symptoms of the illness and desired effects of prescribed medications  Outcome: Progressing  Goal: Agree to be compliant with medication regime, as prescribed and report medication side effects  Description  Interventions:  - Offer appropriate PRN medication and supervise ingestion; conduct aims, as needed   Outcome: Progressing  Goal: Complete daily ADLs, including personal hygiene independently, as able  Description  Interventions:  - Observe, teach, and assist patient with ADLS  - Monitor and promote a balance of rest/activity, with adequate nutrition and elimination   Outcome: Robert Rascon was motivated to shower & groom w/MHT assistance to shave her chin, set up shower, provide hair care while she attended to the rest  Anticipates the pass to the [place] on [place]  Is hoping her sister could come along, see the place & intends to ask about this in Treatment Team tomorrow  She readily came up for supper medicine  Ate 50% of meal (egg salad) & drank her Ensure  She is pleasant, will banter w/staff, socialize some w/peers  Resting now in bed

## 2020-03-09 NOTE — PROGRESS NOTES
Progress Note - Rommel Wilkins 1955, 60 y o  female MRN: [id number]    [facility] Encounter: [id number]    Primary Care Provider: Jaylene England PA-C   Date and time admitted to hospital: 7/23/2019  5:30 PM        * Schizoaffective disorder, bipolar type [facility])  Assessment & Plan  Patient is happy that she could have a pass to the park last Friday which reportedly went well even though she had to come back early because it was too windy  for her  She has been basically staring at the walls laying on bed most of the time but does attend some of the required groups and is still insisting on taking showers only certain days with certain staff assisting her  She is hoping to go for a tour of the [place] [employer] [facility] this week  She is again not very well groomed today but still somewhat disheveled unkempt  She is still somatic claiming that she fears of dying from choking or from shortness of breath etc and needs frequent reminders and reassurance and verbal support  She continues to stalk in a stilted manner and has not voiced any overt delusions even though she periodically questions whether her spiral meet her and inhalants at time bed with by certain staff which is an ongoing paranoia      Current medications:    Current Facility-Administered Medications:     acetaminophen (TYLENOL) tablet 325 mg, 325 mg, Oral, Q6H PRN, Sindy Day MD    acetaminophen (TYLENOL) tablet 650 mg, 650 mg, Oral, Q6H PRN, Sindy Day MD    acetaminophen (TYLENOL) tablet 650 mg, 650 mg, Oral, Q8H PRN, Sindy Day MD    albuterol (PROVENTIL HFA,VENTOLIN HFA) inhaler 2 puff, 2 puff, Inhalation, Q4H PRN, iSndy Day MD, 2 puff at 10/11/19 0424    aluminum-magnesium hydroxide-simethicone (MYLANTA) 200-200-20 mg/5 mL oral suspension 15 mL, 15 mL, Oral, Q4H PRN, Sindy Day MD, 15 mL at 01/26/20 1021    ammonium lactate (LAC-HYDRIN) 12 % lotion 1 application, 1 application, Topical, BID PRN, Sindy Day MD    benzonatate (TESSALON PERLES) capsule 100 mg, 100 mg, Oral, TID PRN, Jeet Levine MD    benztropine (COGENTIN) injection 1 mg, 1 mg, Intramuscular, Q8H PRN, Jeet Levine MD    carbamide peroxide (DEBROX) 6 5 % otic solution 5 drop, 5 drop, Left Ear, BID PRN, Charly Roberts MD    cloZAPine (CLOZARIL) tablet 25 mg, 25 mg, Oral, BID, Jeet Levine MD, 25 mg at 03/08/20 2055    cloZAPine (CLOZARIL) tablet 50 mg, 50 mg, Oral, BID, Jeet Levine MD, 50 mg at 03/08/20 2055    docusate sodium (COLACE) capsule 100 mg, 100 mg, Oral, BID PRN, Jeet Levine MD    EPINEPHrine PF (ADRENALIN) 1 mg/mL injection 0 15 mg, 0 15 mg, Intramuscular, Once PRN, Jeet Levine MD    fluticasone-vilanterol (BREO ELLIPTA) 200-25 MCG/INH inhaler 1 puff, 1 puff, Inhalation, Daily, Jeet Levine MD, 1 puff at 03/08/20 0857    ketotifen (ZADITOR) 0 025 % ophthalmic solution 1 drop, 1 drop, Right Eye, BID PRN, Jeet Levine MD    levothyroxine tablet 125 mcg, 125 mcg, Oral, Early Morning, Jeet Levine MD, 125 mcg at 03/09/20 9538    magnesium hydroxide (MILK OF MAGNESIA) 400 mg/5 mL oral suspension 30 mL, 30 mL, Oral, Daily PRN, Jeet Levine MD    montelukast (SINGULAIR) tablet 10 mg, 10 mg, Oral, HS, Jeet Levine MD, 10 mg at 02/22/20 2104    OLANZapine (ZyPREXA) IM injection 5 mg, 5 mg, Intramuscular, Q8H PRN, Jeet Levine MD    OLANZapine (ZyPREXA) tablet 5 mg, 5 mg, Oral, Q8H PRN, Jeet Levine MD    ondansetron (ZOFRAN-ODT) dispersible tablet 4 mg, 4 mg, Oral, Q6H PRN, Jeet Levine MD, 4 mg at 12/09/19 1757    pantoprazole (PROTONIX) EC tablet 40 mg, 40 mg, Oral, Early Morning, Jeet Levine MD, 40 mg at 03/09/20 5372    polyethylene glycol (MIRALAX) packet 17 g, 17 g, Oral, Daily PRN, Jeet Levine MD    polyvinyl alcohol (LIQUIFILM TEARS) 1 4 % ophthalmic solution 1 drop, 1 drop, Both Eyes, Q3H PRN, Jeet Levine MD    sertraline (ZOLOFT) tablet 175 mg, 175 mg, Oral, Daily, Jeet Levine MD, 175 mg at 03/08/20 0857    sucralfate (CARAFATE) oral suspension 1,000 mg, 1,000 mg, Oral, BID, Cat Torres MD, 1,000 mg at 03/09/20 0614    theophylline (JEF-24) 24 hr capsule 200 mg, 200 mg, Oral, Daily, Tree Madden MD, 200 mg at 03/08/20 0857    tiotropium (SPIRIVA) capsule for inhaler 18 mcg, 18 mcg, Inhalation, Daily, Tree Madden MD, 18 mcg at 03/08/20 0858    traZODone (DESYREL) tablet 25 mg, 25 mg, Oral, HS PRN, Tree Madden MD  Justification if on more than two antipsychotics: not applicable  Side effects if any: none    Risks , benefits, side effects and precautions of medications discussed with patient and reminded patient to let us know any problems with medications     Objective:     Vital Signs:  Vitals:    03/08/20 1600 03/08/20 1643 03/08/20 2036 03/08/20 2143   BP: (!) 88/56 116/78 102/68    BP Location: Left arm Left arm Left arm    Pulse: 65 82 70 84   Resp: 14 17 15    Temp: 97 8 °F (36 6 °C)  (!) 97 °F (36 1 °C)    TempSrc: Temporal  Temporal    SpO2: 95%  95% 93%   Weight:       Height:         Appearance:  age appropriate, casually dressed, disheveled and older than stated age found sitting on bed with fairly good eye contact   Behavior:  deviant, evasive and guarded tends to be suspicious at times   Speech:  delayed, increased latency of response and tangential    Mood:  anxious, euphoric and irritable    Affect:  increased in intensity, increased in range, mood-congruent and redirectable   Thought Process:  circumstantial, concrete, goal directed, illogical and perserverative with stilted talk   Thought Content:  delusions  grandiose and persecutory occasionally suspicious about staff tampering with her oxygen concentrator or inhalant  Still with psychosomatic preoccupation with dying from not able to breathe or choking on food and at times demanding cardiac workup despite any problems  No preoccupation with violence or suicide  No current suicidal homicidal thoughts and under plans reported    No phobias obsessions or compulsions   Perceptual Disturbances: None    Risk Potential: Inability to care for herself and refusal to take showers regularly   Sensorium:  person, place, time/date, situation, day of week, month of year, year and time   Cognition:  grossly intact no recent or remote memory problems, no language deficit, aware of current events   Consciousness:  alert and awake    Attention: Fair   Intellect: Average   Insight:   improving   Judgment: fair       Motor Activity: no abnormal movements         Recent Labs:  Results Reviewed     None          I/O Past 24 hours:  No intake/output data recorded  No intake/output data recorded  Assessment / Plan:     Schizoaffective disorder, bipolar type ([facility] )  Overall status:  improving    Reason for continued inpatient care: to assess ability to maintain improvement by attending to ADLs and taking showers regular and see how she does on outing with family after another outing with staff escort next week  Acceptance by patient: accepting now  Hopefulness in recovery: living at the [facility]  Understanding of medications :  yes  Involved in reintegration process: attending groups and has off grounds and off unit privileges   Trusting in relatoinship with psychiatrist:  Khalif Elliott now  Overall status :  No changes  Recommended Treatment:    Medication changes:  1) none today    Non-pharmacological treatments  1) Continue with individual therapy, group therapy, milieu therapy and occupational therapy using recovery principles and psycho-education about accepting illness and need for treatment     2) encourage to take showers twice a week and cooperate with treatment and adl skills as per her behav  plan  3) another therapeutic pass with sister now that she did well today with the assigned staff escort to the park  4) [facility] as she already had the intake interview and for therapeutic pass with staff tomorrow  Safety  1) Safety/communication plan established targeting dynamic risk factors above  Discharge Plan back to a [facility] at [address] / Coordination of Care    Total floor / unit time spent today 15 minutes  Greater than 50% of total time was spent with the patient and / or family counseling and / or coordination of care  Receptive to listening and learning coping skills for management of symptoms and attending to ADLs  Patient's Rights, confidentiality and exceptions to confidentiality, use of automated medical record, [address] [place] [address] staff access to medical record, and consent to treatment reviewed      Shaggy Rangel MD

## 2020-03-09 NOTE — ASSESSMENT & PLAN NOTE
Patient is happy that she could have a pass to the park last Friday which reportedly went well even though she had to come back early because it was too windy  for her  She has been basically staring at the walls laying on bed most of the time but does attend some of the required groups and is still insisting on taking showers only certain days with certain staff assisting her  She is hoping to go for a tour of the [facility] this week  She is again not very well groomed today but still somewhat disheveled unkempt  She is still somatic claiming that she fears of dying from choking or from shortness of breath etc and needs frequent reminders and reassurance and verbal support  She continues to stalk in a stilted manner and has not voiced any overt delusions even though she periodically questions whether her spiral meet her and inhalants at time bed with by certain staff which is an ongoing paranoia      Current medications:    Current Facility-Administered Medications:     acetaminophen (TYLENOL) tablet 325 mg, 325 mg, Oral, Q6H PRN, Shilpa Trujillo MD    acetaminophen (TYLENOL) tablet 650 mg, 650 mg, Oral, Q6H PRN, Shilpa Trujillo MD    acetaminophen (TYLENOL) tablet 650 mg, 650 mg, Oral, Q8H PRN, Shilpa Trujillo MD    albuterol (PROVENTIL HFA,VENTOLIN HFA) inhaler 2 puff, 2 puff, Inhalation, Q4H PRN, Shilpa Trujillo MD, 2 puff at 10/11/19 0424    aluminum-magnesium hydroxide-simethicone (MYLANTA) 200-200-20 mg/5 mL oral suspension 15 mL, 15 mL, Oral, Q4H PRN, Shilpa Trujillo MD, 15 mL at 01/26/20 1021    ammonium lactate (LAC-HYDRIN) 12 % lotion 1 application, 1 application, Topical, BID PRN, Shilpa Trujillo MD    benzonatate (TESSALON PERLES) capsule 100 mg, 100 mg, Oral, TID PRN, Shilpa Trujillo MD    benztropine (COGENTIN) injection 1 mg, 1 mg, Intramuscular, Q8H PRN, Shilpa Trujillo MD    carbamide peroxide (DEBROX) 6 5 % otic solution 5 drop, 5 drop, Left Ear, BID PRN, Myles Patterson MD    cloZAPine (CLOZARIL) tablet 25 mg, 25 mg, Oral, BID, Ervin Little MD, 25 mg at 03/08/20 2055    cloZAPine (CLOZARIL) tablet 50 mg, 50 mg, Oral, BID, Ervin Little MD, 50 mg at 03/08/20 2055    docusate sodium (COLACE) capsule 100 mg, 100 mg, Oral, BID PRN, Ervin Little MD    EPINEPHrine PF (ADRENALIN) 1 mg/mL injection 0 15 mg, 0 15 mg, Intramuscular, Once PRN, Ervin Little MD    fluticasone-vilanterol (BREO ELLIPTA) 200-25 MCG/INH inhaler 1 puff, 1 puff, Inhalation, Daily, Ervin Little MD, 1 puff at 03/08/20 0857    ketotifen (ZADITOR) 0 025 % ophthalmic solution 1 drop, 1 drop, Right Eye, BID PRN, Ervin Little MD    levothyroxine tablet 125 mcg, 125 mcg, Oral, Early Morning, Ervin Little MD, 125 mcg at 03/09/20 6041    magnesium hydroxide (MILK OF MAGNESIA) 400 mg/5 mL oral suspension 30 mL, 30 mL, Oral, Daily PRN, Ervin Little MD    montelukast (SINGULAIR) tablet 10 mg, 10 mg, Oral, HS, Ervin Little MD, 10 mg at 02/22/20 2104    OLANZapine (ZyPREXA) IM injection 5 mg, 5 mg, Intramuscular, Q8H PRN, Ervin Little MD    OLANZapine (ZyPREXA) tablet 5 mg, 5 mg, Oral, Q8H PRN, Ervin Little MD    ondansetron (ZOFRAN-ODT) dispersible tablet 4 mg, 4 mg, Oral, Q6H PRN, Ervin Little MD, 4 mg at 12/09/19 1757    pantoprazole (PROTONIX) EC tablet 40 mg, 40 mg, Oral, Early Morning, Ervin Little MD, 40 mg at 03/09/20 0683    polyethylene glycol (MIRALAX) packet 17 g, 17 g, Oral, Daily PRN, Ervin Little MD    polyvinyl alcohol (LIQUIFILM TEARS) 1 4 % ophthalmic solution 1 drop, 1 drop, Both Eyes, Q3H PRN, Ervin Little MD    sertraline (ZOLOFT) tablet 175 mg, 175 mg, Oral, Daily, Ervin Little MD, 175 mg at 03/08/20 0857    sucralfate (CARAFATE) oral suspension 1,000 mg, 1,000 mg, Oral, BID, Cat Torres MD, 1,000 mg at 03/09/20 0614    theophylline (JEF-24) 24 hr capsule 200 mg, 200 mg, Oral, Daily, Ervin Little MD, 200 mg at 03/08/20 0857    tiotropium (SPIRIVA) capsule for inhaler 18 mcg, 18 mcg, Inhalation, Daily, Carissa Willis MD, 18 mcg at 03/08/20 0858    traZODone (DESYREL) tablet 25 mg, 25 mg, Oral, HS PRN, Carissa Willis MD  Justification if on more than two antipsychotics: not applicable  Side effects if any: none    Risks , benefits, side effects and precautions of medications discussed with patient and reminded patient to let us know any problems with medications     Objective:     Vital Signs:  Vitals:    03/08/20 1600 03/08/20 1643 03/08/20 2036 03/08/20 2143   BP: (!) 88/56 116/78 102/68    BP Location: Left arm Left arm Left arm    Pulse: 65 82 70 84   Resp: 14 17 15    Temp: 97 8 °F (36 6 °C)  (!) 97 °F (36 1 °C)    TempSrc: Temporal  Temporal    SpO2: 95%  95% 93%   Weight:       Height:         Appearance:  age appropriate, casually dressed, disheveled and older than stated age found sitting on bed with fairly good eye contact   Behavior:  deviant, evasive and guarded tends to be suspicious at times   Speech:  delayed, increased latency of response and tangential    Mood:  anxious, euphoric and irritable    Affect:  increased in intensity, increased in range, mood-congruent and redirectable   Thought Process:  circumstantial, concrete, goal directed, illogical and perserverative with stilted talk   Thought Content:  delusions  grandiose and persecutory occasionally suspicious about staff tampering with her oxygen concentrator or inhalant  Still with psychosomatic preoccupation with dying from not able to breathe or choking on food and at times demanding cardiac workup despite any problems  No preoccupation with violence or suicide  No current suicidal homicidal thoughts and under plans reported    No phobias obsessions or compulsions   Perceptual Disturbances: None    Risk Potential: Inability to care for herself and refusal to take showers regularly   Sensorium:  person, place, time/date, situation, day of week, month of year, year and time   Cognition:  grossly intact no recent or remote memory problems, no language deficit, aware of current events   Consciousness:  alert and awake    Attention: Fair   Intellect: Average   Insight:   improving   Judgment: fair       Motor Activity: no abnormal movements         Recent Labs:  Results Reviewed     None          I/O Past 24 hours:  No intake/output data recorded  No intake/output data recorded  Assessment / Plan:     Schizoaffective disorder, bipolar type ([address] )  Overall status:  improving    Reason for continued inpatient care: to assess ability to maintain improvement by attending to ADLs and taking showers regular and see how she does on outing with family after another outing with staff escort next week  Acceptance by patient: accepting now  Hopefulness in recovery: living at the [facility]  Understanding of medications :  yes  Involved in reintegration process: attending groups and has off grounds and off unit privileges   Trusting in relatoinship with psychiatrist:  Emeterio Caraballo now  Overall status :  No changes  Recommended Treatment:    Medication changes:  1) none today    Non-pharmacological treatments  1) Continue with individual therapy, group therapy, milieu therapy and occupational therapy using recovery principles and psycho-education about accepting illness and need for treatment   2) encourage to take showers twice a week and cooperate with treatment and adl skills as per her behav  plan  3) another therapeutic pass with sister now that she did well today with the assigned staff escort to the park  4) Prepare for the [address] scheduled for next Wednesday as she already had the intake interview and for therapeutic pass with staff tomorrow  Safety  1) Safety/communication plan established targeting dynamic risk factors above  Discharge Plan back to a [facility] at [address] / Coordination of Care    Total floor / unit time spent today 15 minutes   Greater than 50% of total time was spent with the patient and / or family counseling and / or coordination of care  Receptive to listening and learning coping skills for management of symptoms and attending to ADLs  Patient's Rights, confidentiality and exceptions to confidentiality, use of automated medical record, Jeb Patrick staff access to medical record, and consent to treatment reviewed      Zac Sierra MD

## 2020-03-09 NOTE — PLAN OF CARE
Problem: Alteration in Thoughts and Perception  Goal: Treatment Goal: Gain control of psychotic behaviors/thinking, reduce/eliminate presenting symptoms and demonstrate improved reality functioning upon discharge  Outcome: Progressing  Goal: Verbalize thoughts and feelings  Description  Interventions:  - Promote a nonjudgmental and trusting relationship with the patient through active listening and therapeutic communication  - Assess patient's level of functioning, behavior and potential for risk  - Engage patient in 1 on 1 interactions for a minimum of 15 minutes each session  - Encourage patient to express fears, feelings, frustrations, and discuss symptoms    - [place] patient to reality, help patient recognize reality-based thinking   - Administer medications as ordered and assess for potential side effects  - Provide the patient education related to the signs and symptoms of the illness and desired effects of prescribed medications  Outcome: Progressing  Goal: Agree to be compliant with medication regime, as prescribed and report medication side effects  Description  Interventions:  - Offer appropriate PRN medication and supervise ingestion; conduct aims, as needed   Outcome: Progressing  Goal: Attend and participate in unit activities, including therapeutic, recreational, and educational groups  Description  Interventions:  - Provide therapeutic and educational activities daily, encourage attendance and participation, and document same in the medical record     CERTIFIED PEER SPECIALIST INTERVENTIONS:    Complete peer assessment with patient to assess their needs and identify their goals to complete while in the recovery program as well as once discharged into the community  Patient will complete WRAP Plan, Crisis Plan and [facility]  Patient will attend 50% of groups offered on the unit  Patient will complete a goal card weekly      Outcome: Progressing  Goal: Recognize dysfunctional thoughts, communicate reality-based thoughts at the time of discharge  Description  Interventions:  - Provide medication and psycho-education to assist patient in compliance and developing insight into his/her illness   Outcome: Progressing     Problem: Ineffective Coping  Goal: Participates in unit activities  Description  Interventions:  - Provide therapeutic environment   - Provide required programming   - Redirect inappropriate behaviors   Outcome: Progressing  Goal: Patient/Family verbalizes awareness of resources  Outcome: Progressing  Goal: Understands least restrictive measures  Description  Interventions:  - Utilize least restrictive behavior  Outcome: Progressing     Problem: Risk for Self Injury/Neglect  Goal: Treatment Goal: Remain safe during length of stay, learn and adopt new coping skills, and be free of self-injurious ideation, impulses and acts at the time of discharge  Outcome: Progressing  Goal: Verbalize thoughts and feelings  Description  Interventions:  - Assess and re-assess patient's lethality and potential for self-injury  - Engage patient in 1:1 interactions, daily, for a minimum of 15 minutes  - Encourage patient to express feelings, fears, frustrations, hopes  - Establish rapport/trust with patient   Outcome: Progressing  Goal: Refrain from harming self  Description  Interventions:  - Monitor patient closely, per order  - Develop a trusting relationship  - Supervise medication ingestion, monitor effects and side effects   Outcome: Progressing  Goal: Attend and participate in unit activities, including therapeutic, recreational, and educational groups  Description  Interventions:  - Provide therapeutic and educational activities daily, encourage attendance and participation, and document same in the medical record  - Obtain collateral information, encourage visitation and family involvement in care   Outcome: Progressing  Goal: Recognize maladaptive responses and adopt new coping mechanisms  Outcome: Progressing     Problem: Depression  Goal: Treatment Goal: Demonstrate behavioral control of depressive symptoms, verbalize feelings of improved mood/affect, and adopt new coping skills prior to discharge  Outcome: Progressing  Goal: Verbalize thoughts and feelings  Description  Interventions:  - Assess and re-assess patient's level of risk   - Engage patient in 1:1 interactions, daily, for a minimum of 15 minutes   - Encourage patient to express feelings, fears, frustrations, hopes   Outcome: Progressing     Problem: Anxiety  Goal: Anxiety is at manageable level  Description  Interventions:  - Assess and monitor patient's anxiety level  - Monitor for signs and symptoms of anxiety both physical and emotional (heart palpitations, chest pain, shortness of breath, headaches, nausea, feeling jumpy, restlessness, irritable, apprehensive)  - Collaborate with interdisciplinary team and initiate plan and interventions as ordered    - [place] patient to unit/surroundings  - Explain treatment plan  - Encourage participation in care  - Encourage verbalization of concerns/fears  - Identify coping mechanisms  - Assist in developing anxiety-reducing skills  - Administer/offer alternative therapies  - Limit or eliminate stimulants  Outcome: Progressing     Problem: Alteration in Orientation  Goal: Interact with staff daily  Description  Interventions:  - Assess and re-assess patient's level of orientation  - Engage patient in 1 on 1 interactions, daily, for a minimum of 15 minutes   - Establish rapport/trust with patient   Outcome: Progressing     Problem: PAIN - ADULT  Goal: Verbalizes/displays adequate comfort level or baseline comfort level  Description  Interventions:  - Encourage patient to monitor pain and request assistance  - Assess pain using appropriate pain scale  - Administer analgesics based on type and severity of pain and evaluate response  - Implement non-pharmacological measures as appropriate and evaluate response  - Consider cultural and social influences on pain and pain management  - Notify physician/advanced practitioner if interventions unsuccessful or patient reports new pain  Outcome: Progressing     Problem: SAFETY ADULT  Goal: Patient will remain free of falls  Description  INTERVENTIONS:  - Assess patient frequently for physical needs  -  Identify cognitive and physical deficits and behaviors that affect risk of falls  -  [place] fall precautions as indicated by assessment   - Educate patient/family on patient safety including physical limitations  - Instruct patient to call for assistance with activity based on assessment  - Modify environment to reduce risk of injury  - Consider OT/PT consult to assist with strengthening/mobility  Outcome: Progressing     Problem: Nutrition/Hydration-ADULT  Goal: Nutrient/Hydration intake appropriate for improving, restoring or maintaining nutritional needs  Description  Monitor and assess patient's nutrition/hydration status for malnutrition  Collaborate with interdisciplinary team and initiate plan and interventions as ordered  Monitor patient's weight and dietary intake as ordered or per policy  Utilize nutrition screening tool and intervene as necessary  Determine patient's food preferences and provide high-protein, high-caloric foods as appropriate       INTERVENTIONS:  - Monitor oral intake, urinary output, labs, and treatment plans  - Assess nutrition and hydration status and recommend course of action  - Evaluate amount of meals eaten  - Assist patient with eating if necessary   - Allow adequate time for meals  - Recommend/ encourage appropriate diets, oral nutritional supplements, and vitamin/mineral supplements  - Order, calculate, and assess calorie counts as needed  - Recommend, monitor, and adjust tube feedings and TPN/PPN based on assessed needs  - Assess need for intravenous fluids  - Provide specific nutrition/hydration education as appropriate  - Include patient/family/caregiver in decisions related to nutrition  Outcome: Progressing     Problem: Alteration in Thoughts and Perception  Goal: Complete daily ADLs, including personal hygiene independently, as able  Description  Interventions:  - Observe, teach, and assist patient with ADLS  - Monitor and promote a balance of rest/activity, with adequate nutrition and elimination   Outcome: Not Progressing     Problem: Depression  Goal: Refrain from isolation  Description  Interventions:  - Develop a trusting relationship   - Encourage socialization   Outcome: Not Progressing  Goal: Refrain from self-neglect  Outcome: Not Progressing   Mostly isolative to her bed came out of her room for meds and meals, and attended community meeting  Otherwise, napped for most of the day  Minimally interacted with others  Did not shower or do hygiene and looks disheveled  Ate 75% of breakfast and 100% of lunch  No other behaviors or issues noted  No somatic complaints voiced  Continue to monitor

## 2020-03-09 NOTE — PROGRESS NOTES
03/06/20 1100   Activity/Group Checklist   Group Other (Comment)  ([facility] - Weekly Goal Review/Hopelessness)   Attendance Attended   Attendance Duration (min) 46-60   Interactions Interacted appropriately   Affect/Mood Appropriate   Goals Achieved Discussed coping strategies; Increased hopefulness;Verbalized increased hopefulness; Able to self-disclose; Able to recieve feedback; Able to give feedback to another;Identified resources and support systems; Able to listen to others; Able to engage in interactions; Able to reflect/comment on own behavior     Patient attended [facility] - Weekly Goal Review/Hopelessness  She reported that she completed her goals of going on a "successful" pass, strive for a four hour pass, and attend more groups  Patient expressed increased hopefulness after going out on a pass this morning  She reported that the pass was "successful "  Patient was acknowledged for this step, and given reinforcement for earning her therapeutic pass off-grounds with family

## 2020-03-10 NOTE — PLAN OF CARE
Problem: Alteration in Thoughts and Perception  Goal: Verbalize thoughts and feelings  Description  Interventions:  - Promote a nonjudgmental and trusting relationship with the patient through active listening and therapeutic communication  - Assess patient's level of functioning, behavior and potential for risk  - Engage patient in 1 on 1 interactions for a minimum of 15 minutes each session  - Encourage patient to express fears, feelings, frustrations, and discuss symptoms    - [place] patient to reality, help patient recognize reality-based thinking   - Administer medications as ordered and assess for potential side effects  - Provide the patient education related to the signs and symptoms of the illness and desired effects of prescribed medications  Outcome: Progressing  Goal: Agree to be compliant with medication regime, as prescribed and report medication side effects  Description  Interventions:  - Offer appropriate PRN medication and supervise ingestion; conduct aims, as needed   Outcome: Progressing  Goal: Complete daily ADLs, including personal hygiene independently, as able  Description  Interventions:  - Observe, teach, and assist patient with ADLS  - Monitor and promote a balance of rest/activity, with adequate nutrition and elimination   Outcome: Progressing     Problem: Depression  Goal: Refrain from isolation  Description  Interventions:  - Develop a trusting relationship   - Encourage socialization   Outcome: Progressing     Problem: Nutrition/Hydration-ADULT  Goal: Nutrient/Hydration intake appropriate for improving, restoring or maintaining nutritional needs  Description  Monitor and assess patient's nutrition/hydration status for malnutrition  Collaborate with interdisciplinary team and initiate plan and interventions as ordered  Monitor patient's weight and dietary intake as ordered or per policy  Utilize nutrition screening tool and intervene as necessary   Determine patient's food preferences and provide high-protein, high-caloric foods as appropriate  INTERVENTIONS:  - Monitor oral intake, urinary output, labs, and treatment plans  - Assess nutrition and hydration status and recommend course of action  - Evaluate amount of meals eaten  - Assist patient with eating if necessary   - Allow adequate time for meals  - Recommend/ encourage appropriate diets, oral nutritional supplements, and vitamin/mineral supplements  - Order, calculate, and assess calorie counts as needed  - Recommend, monitor, and adjust tube feedings and TPN/PPN based on assessed needs  - Assess need for intravenous fluids  - Provide specific nutrition/hydration education as appropriate  - Include patient/family/caregiver in decisions related to nutrition  Outcome: Not Progressing     [address] was up in dining room @ shift onset working cross word puzzles, chatting w/nearby peers & staff  She initiated wanting to shower so prepared for pass to the  [employer]  Staff set her up in shower to wash, then, when done, combed out/re-braided her hair for her as she remains insistent that can't manage hair herself as can't hold arms up over head/hasn't strength to pull comb through, braid  She readily came up for supper medicine  Ate poorly, 10% (40ml juice, bites egg salad, saltines) of meal, but, had an Ensure  Explained, too tired from effort of showering to have much appetite  Resting now in bed

## 2020-03-10 NOTE — ASSESSMENT & PLAN NOTE
Doing well and excited about going for touring the [address] [place] [address] and also plans to go on a day pass with her sister this week  In good spirits, friendly and pleasant, still psychosomatic today about may be having a UTI, still anxious and occasionally suspicious about staff tampering with her oxygen concentrator and her spirometer  No over delusions verbalized  No side effects, compliant with meds so far  Does attend just about 50% of groups so far  Not very well groomed and still tends to be poorly groomed but agrees to take a shower and be ready tomorrow's pass at the [address] [place] [address] Rd  Continues to talk with stilted speech         Current medications:    Current Facility-Administered Medications:     acetaminophen (TYLENOL) tablet 325 mg, 325 mg, Oral, Q6H PRN, Isidoro Gnozalez MD    acetaminophen (TYLENOL) tablet 650 mg, 650 mg, Oral, Q6H PRN, Isidoro Gonzalez MD    acetaminophen (TYLENOL) tablet 650 mg, 650 mg, Oral, Q8H PRN, Isidoro Gonzalez MD    albuterol (PROVENTIL HFA,VENTOLIN HFA) inhaler 2 puff, 2 puff, Inhalation, Q4H PRN, Isidoro Gonzalez MD, 2 puff at 10/11/19 0424    aluminum-magnesium hydroxide-simethicone (MYLANTA) 200-200-20 mg/5 mL oral suspension 15 mL, 15 mL, Oral, Q4H PRN, Isiodro Gonzalez MD, 15 mL at 01/26/20 1021    ammonium lactate (LAC-HYDRIN) 12 % lotion 1 application, 1 application, Topical, BID PRN, Isidoro Gonzalez MD    benzonatate (TESSALON PERLES) capsule 100 mg, 100 mg, Oral, TID PRN, Isidoro Gonzalez MD    benztropine (COGENTIN) injection 1 mg, 1 mg, Intramuscular, Q8H PRN, Isidoro Gonzalez MD    carbamide peroxide (DEBROX) 6 5 % otic solution 5 drop, 5 drop, Left Ear, BID PRN, Kyrie Zambrano MD    cloZAPine (CLOZARIL) tablet 25 mg, 25 mg, Oral, BID, Isidoro Gonzalez MD, 25 mg at 03/09/20 2052    cloZAPine (CLOZARIL) tablet 50 mg, 50 mg, Oral, BID, Isidoro Gonzalez MD, 50 mg at 03/09/20 2052    docusate sodium (COLACE) capsule 100 mg, 100 mg, Oral, BID PRN, Isidoro Gonzalez MD    EPINEPHrine PF (ADRENALIN) 1 mg/mL injection 0 15 mg, 0 15 mg, Intramuscular, Once PRN, Alirio Frazier MD    fluticasone-vilanterol (BREO ELLIPTA) 200-25 MCG/INH inhaler 1 puff, 1 puff, Inhalation, Daily, Alirio Frazier MD, 1 puff at 03/10/20 0825    ketotifen (ZADITOR) 0 025 % ophthalmic solution 1 drop, 1 drop, Right Eye, BID PRN, Alirio Frazier MD    levothyroxine tablet 125 mcg, 125 mcg, Oral, Early Morning, Alirio Frazier MD, 125 mcg at 03/10/20 0602    magnesium hydroxide (MILK OF MAGNESIA) 400 mg/5 mL oral suspension 30 mL, 30 mL, Oral, Daily PRN, Alirio Frazier MD    montelukast (SINGULAIR) tablet 10 mg, 10 mg, Oral, HS, Alirio Frazier MD, 10 mg at 02/22/20 2104    OLANZapine (ZyPREXA) IM injection 5 mg, 5 mg, Intramuscular, Q8H PRN, Alirio Frazier MD    OLANZapine (ZyPREXA) tablet 5 mg, 5 mg, Oral, Q8H PRN, Alirio Frazier MD    ondansetron (ZOFRAN-ODT) dispersible tablet 4 mg, 4 mg, Oral, Q6H PRN, Alirio Frazier MD, 4 mg at 12/09/19 1757    pantoprazole (PROTONIX) EC tablet 40 mg, 40 mg, Oral, Early Morning, Alirio Frazier MD, 40 mg at 03/10/20 0602    polyethylene glycol (MIRALAX) packet 17 g, 17 g, Oral, Daily PRN, Alirio Frazier MD    polyvinyl alcohol (LIQUIFILM TEARS) 1 4 % ophthalmic solution 1 drop, 1 drop, Both Eyes, Q3H PRN, Alirio Frazier MD    sertraline (ZOLOFT) tablet 175 mg, 175 mg, Oral, Daily, Alirio Frazier MD, 175 mg at 03/10/20 4454    sucralfate (CARAFATE) oral suspension 1,000 mg, 1,000 mg, Oral, BID, Cat Torres MD, 1,000 mg at 03/10/20 0602    theophylline (JEF-24) 24 hr capsule 200 mg, 200 mg, Oral, Daily, Alirio Frazier MD, 200 mg at 03/10/20 0610    tiotropium [facility]) capsule for inhaler 18 mcg, 18 mcg, Inhalation, Daily, Alirio Frazier MD, 18 mcg at 03/10/20 0825    traZODone (DESYREL) tablet 25 mg, 25 mg, Oral, HS PRN, Alirio Frazier MD  Justification if on more than two antipsychotics: not applicable  Side effects if any: none    Risks , benefits, side effects and precautions of medications discussed with patient and reminded patient to let us know any problems with medications     Objective:     Vital Signs:  Vitals:    03/09/20 1600 03/09/20 1947 03/10/20 0700 03/10/20 0705   BP: 106/66 99/62 109/75 108/60   BP Location: Left arm Left arm Left arm Left arm   Pulse: 81 74 86 71   Resp: 16 18 18 18   Temp: (!) 97 1 °F (36 2 °C) 97 8 °F (36 6 °C) 97 6 °F (36 4 °C)    TempSrc: Temporal Temporal Temporal    SpO2: 95% 92% 92%    Weight:       Height:         Appearance:  age appropriate, casually dressed, disheveled and older than stated age pleasant friendly today with good eye contact hair uncombed   Behavior:  deviant, evasive and guarded tends to be suspicious   Speech:  delayed, increased latency of response and tangential    Mood:  anxious, euphoric and irritable    Affect:  increased in intensity, increased in range, mood-congruent and redirectable   Thought Process:  circumstantial, concrete, goal directed, illogical and perserverative with stilted speech   Thought Content:  delusions  grandiose and persecutory no current suicidal homicidal thoughts intent or plans  No preoccupation with violence  No phobias obsessions or compulsions  However still with psychosomatic symptoms about her having a UTI, needing cardiac workup, dying from choking and from shortness of breath except but receptive to verbal support and reassurance    Still somewhat paranoid about certain staff but no other clear-cut delusions elicited   Perceptual Disturbances: None    Risk Potential: Inability to care for herself and refusal to take showers regularly   Sensorium:  person, place, time/date, situation, day of week, month of year, year and time   Cognition:  grossly intact no current deficit in recent or remote memory, aware of current events, no language deficit   Consciousness:  alert and awake    Attention: Fair   Intellect: Estimated to be at least within average range   Insight:   Improving   Judgment: fair       Motor Activity: no abnormal movements         Recent Labs:  Results Reviewed     None          I/O Past 24 hours:  No intake/output data recorded  No intake/output data recorded  Assessment / Plan:     Schizoaffective disorder, bipolar type ([address] )  Overall status:  improving    Reason for continued inpatient care: to assess ability to maintain improvement by attending to ADLs and taking showers regular and see how she does on outing with family after another outing with staff escort next week  Acceptance by patient: accepting now  Hopefulness in recovery: living at the [facility] soon  Understanding of medications :  yes  Involved in reintegration process: attending groups and has off grounds and off unit privileges   Trusting in relatoinship with psychiatrist:  Marta Villatoro now  Overall status :  No changes  Recommended Treatment:    Medication changes:  1) none today    Non-pharmacological treatments  1) Continue with individual therapy, group therapy, milieu therapy and occupational therapy using recovery principles and psycho-education about accepting illness and need for treatment   2) encourage to take showers twice a week and cooperate with treatment and adl skills as per her behav  plan  3) another therapeutic pass with sister now that she did well today with the assigned staff escort to the park  4) Prepare for the [address] scheduled for next Wednesday as she already had the intake interview and for therapeutic pass with staff tomorrow  Safety  1) Safety/communication plan established targeting dynamic risk factors above  [facility]charge [facility] back [facility] a [facility] [address] / Coordination of Care    Total floor / unit time spent today 15 minutes  Greater than 50% of total time was spent with the patient and / or family counseling and / or coordination of care  Receptive to listening and learning coping skills for management of symptoms and attending to ADLs       Patient's Rights, confidentiality and exceptions to confidentiality, use of automated medical record, [address] staff access to medical record, and consent to treatment reviewed      Shilpa Trujillo MD

## 2020-03-10 NOTE — TREATMENT TEAM
03/10/20 1430   Activity/Group Checklist   Group Other (Comment)  (recovery workshop)   Attendance Attended   Attendance Duration (min) 16-30   Interactions Interacted appropriately   Affect/Mood Appropriate   Goals Achieved Able to self-disclose     Patient attended group today  Patient was able to work on different assessments such as wrap plans, self-assessments, or treatment goal  Patient participated and was respectful of peers

## 2020-03-10 NOTE — PROGRESS NOTES
2100 Chanda Rod was visited by her sister w/warm interactions  Sister brought macaroni & cheese, blueberry pie, both of which Maggie ate  Mansi Marcos was then "too full" to perform Incentive Spirometry tonight, was opting out of HS snack & going to bed now  Wearing her QHS humidified nasal O2 @ 1L for bed  Did not attend PM Group as disinclined to go outside to [facility] where it was held

## 2020-03-10 NOTE — TREATMENT TEAM
03/10/20 1100   Activity/Group Checklist   Group Other (Comment)  (IMR)   Attendance Attended   Attendance Duration (min) 46-60   Interactions Interacted appropriately   Affect/Mood Appropriate   Goals Achieved Identified feelings; Able to listen to others; Able to engage in interactions; Able to reflect/comment on own behavior;Able to manage/cope with feelings; Able to self-disclose     Patient attended group today  Group focused on relaxation and social interaction  Patient did well with verbalizing her feelings and interacting with other patients  Patient participated and was respectful of peers

## 2020-03-10 NOTE — PROGRESS NOTES
Patient in bed, asleep since shift onset  Utilizing 1L humidified oxygen via NC  No respiratory distress  No behaviors or issues overnight

## 2020-03-10 NOTE — PROGRESS NOTES
Progress Note - Cathy Arroyo 1955, 60 y o  female MRN: [id number]    [facility] Encounter: [id number]    Primary Care Provider: Donato Jackson PA-C   Date and time admitted to hospital: 7/23/2019  5:30 PM        * Schizoaffective disorder, bipolar type [facility])  Assessment & Plan  Doing well and excited about going for touring the [address] [place] [address] and also plans to go on a day pass with her sister this week  In good spirits, friendly and pleasant, still psychosomatic today about may be having a UTI, still anxious and occasionally suspicious about staff tampering with her oxygen concentrator and her spirometer  No over delusions verbalized  No side effects, compliant with meds so far  Does attend just about 50% of groups so far  Not very well groomed and still tends to be poorly groomed but agrees to take a shower and be ready tomorrow's pass at the [address] [place] [address]  Continues to talk with stilted speech         Current medications:    Current Facility-Administered Medications:     acetaminophen (TYLENOL) tablet 325 mg, 325 mg, Oral, Q6H PRN, Isidoro Gonzalez MD    acetaminophen (TYLENOL) tablet 650 mg, 650 mg, Oral, Q6H PRN, Isidoro Gonzalez MD    acetaminophen (TYLENOL) tablet 650 mg, 650 mg, Oral, Q8H PRN, Isidoro Gonzalez MD    albuterol (PROVENTIL HFA,VENTOLIN HFA) inhaler 2 puff, 2 puff, Inhalation, Q4H PRN, Isidoro Gonzalez MD, 2 puff at 10/11/19 0424    aluminum-magnesium hydroxide-simethicone (MYLANTA) 200-200-20 mg/5 mL oral suspension 15 mL, 15 mL, Oral, Q4H PRN, Isidoro Gonzalez MD, 15 mL at 01/26/20 1021    ammonium lactate (LAC-HYDRIN) 12 % lotion 1 application, 1 application, Topical, BID PRN, Isidoro Gonzalez MD    benzonatate (TESSALON PERLES) capsule 100 mg, 100 mg, Oral, TID PRN, Isidoro Gonzalez MD    benztropine (COGENTIN) injection 1 mg, 1 mg, Intramuscular, Q8H PRN, Isidoro Gonzalez MD    carbamide peroxide (DEBROX) 6 5 % otic solution 5 drop, 5 drop, Left Ear, BID PRN, Kyrie Zambrano MD    cloZAPine (CLOZARIL) tablet 25 mg, 25 mg, Oral, BID, Shi Pham MD, 25 mg at 03/09/20 2052    cloZAPine (CLOZARIL) tablet 50 mg, 50 mg, Oral, BID, Shi Pham MD, 50 mg at 03/09/20 2052    docusate sodium (COLACE) capsule 100 mg, 100 mg, Oral, BID PRN, Shi Pham MD    EPINEPHrine PF (ADRENALIN) 1 mg/mL injection 0 15 mg, 0 15 mg, Intramuscular, Once PRN, Shi Pham MD    fluticasone-vilanterol (BREO ELLIPTA) 200-25 MCG/INH inhaler 1 puff, 1 puff, Inhalation, Daily, Shi Pham MD, 1 puff at 03/10/20 0825    ketotifen (ZADITOR) 0 025 % ophthalmic solution 1 drop, 1 drop, Right Eye, BID PRN, Shi Pham MD    levothyroxine tablet 125 mcg, 125 mcg, Oral, Early Morning, Shi Pham MD, 125 mcg at 03/10/20 0602    magnesium hydroxide (MILK OF MAGNESIA) 400 mg/5 mL oral suspension 30 mL, 30 mL, Oral, Daily PRN, Shi Pham MD    montelukast (SINGULAIR) tablet 10 mg, 10 mg, Oral, HS, Shi Pham MD, 10 mg at 02/22/20 2104    OLANZapine (ZyPREXA) IM injection 5 mg, 5 mg, Intramuscular, Q8H PRN, Shi Pham MD    OLANZapine (ZyPREXA) tablet 5 mg, 5 mg, Oral, Q8H PRN, Shi Pham MD    ondansetron (ZOFRAN-ODT) dispersible tablet 4 mg, 4 mg, Oral, Q6H PRN, Shi Pham MD, 4 mg at 12/09/19 1757    pantoprazole (PROTONIX) EC tablet 40 mg, 40 mg, Oral, Early Morning, Shi Pham MD, 40 mg at 03/10/20 0602    polyethylene glycol (MIRALAX) packet 17 g, 17 g, Oral, Daily PRN, Shi Pham MD    polyvinyl alcohol (LIQUIFILM TEARS) 1 4 % ophthalmic solution 1 drop, 1 drop, Both Eyes, Q3H PRN, Shi Pham MD    sertraline (ZOLOFT) tablet 175 mg, 175 mg, Oral, Daily, Shi Pham MD, 175 mg at 03/10/20 2595    sucralfate (CARAFATE) oral suspension 1,000 mg, 1,000 mg, Oral, BID, Cat Torres MD, 1,000 mg at 03/10/20 0602    theophylline (JEF-24) 24 hr capsule 200 mg, 200 mg, Oral, Daily, Shi Pham MD, 200 mg at 03/10/20 0823    tiotropium (SPIRIVA) capsule for inhaler 18 mcg, 18 mcg, Inhalation, Daily, Chichi Lockett MD, 18 mcg at 03/10/20 0825    traZODone (DESYREL) tablet 25 mg, 25 mg, Oral, HS PRN, Chichi Lockett MD  Justification if on more than two antipsychotics: not applicable  Side effects if any: none    Risks , benefits, side effects and precautions of medications discussed with patient and reminded patient to let us know any problems with medications     Objective:     Vital Signs:  Vitals:    03/09/20 1600 03/09/20 1947 03/10/20 0700 03/10/20 0705   BP: 106/66 99/62 109/75 108/60   BP Location: Left arm Left arm Left arm Left arm   Pulse: 81 74 86 71   Resp: 16 18 18 18   Temp: (!) 97 1 °F (36 2 °C) 97 8 °F (36 6 °C) 97 6 °F (36 4 °C)    TempSrc: Temporal Temporal Temporal    SpO2: 95% 92% 92%    Weight:       Height:         Appearance:  age appropriate, casually dressed, disheveled and older than stated age pleasant friendly today with good eye contact hair uncombed   Behavior:  deviant, evasive and guarded tends to be suspicious   Speech:  delayed, increased latency of response and tangential    Mood:  anxious, euphoric and irritable    Affect:  increased in intensity, increased in range, mood-congruent and redirectable   Thought Process:  circumstantial, concrete, goal directed, illogical and perserverative with stilted speech   Thought Content:  delusions  grandiose and persecutory no current suicidal homicidal thoughts intent or plans  No preoccupation with violence  No phobias obsessions or compulsions  However still with psychosomatic symptoms about her having a UTI, needing cardiac workup, dying from choking and from shortness of breath except but receptive to verbal support and reassurance    Still somewhat paranoid about certain staff but no other clear-cut delusions elicited   Perceptual Disturbances: None    Risk Potential: Inability to care for herself and refusal to take showers regularly   Sensorium:  person, place, time/date, situation, day of week, month of year, year and time Cognition:  grossly intact no current deficit in recent or remote memory, aware of current events, no language deficit   Consciousness:  alert and awake    Attention: Fair   Intellect: Estimated to be at least within average range   Insight:   Improving   Judgment: fair       Motor Activity: no abnormal movements         Recent Labs:  Results Reviewed     None          I/O Past 24 hours:  No intake/output data recorded  No intake/output data recorded  Assessment / Plan:     Schizoaffective disorder, bipolar type ([address] )  Overall status:  improving    Reason for continued inpatient care: to assess ability to maintain improvement by attending to ADLs and taking showers regular and see how she does on outing with family after another outing with staff escort next week  Acceptance by patient: accepting now  Hopefulness in recovery: living at the [facility]  Understanding of medications :  yes  Involved in reintegration process: attending groups and has off grounds and off unit privileges   Trusting in relatoinship with psychiatrist:  Isabella Bai now  Overall status :  No changes  Recommended Treatment:    Medication changes:  1) none today    Non-pharmacological treatments  1) Continue with individual therapy, group therapy, milieu therapy and occupational therapy using recovery principles and psycho-education about accepting illness and need for treatment   2) encourage to take showers twice a week and cooperate with treatment and adl skills as per her behav  plan  3) another therapeutic pass with sister now that she did well today with the assigned staff escort to the park  4) Prepare for the [address] scheduled for next Wednesday as she already had the intake interview and for therapeutic pass with staff tomorrow  Safety  1) Safety/communication plan established targeting dynamic risk factors above    Discharge Plan back to a [facility] at [address] / Coordination of Care    Total floor / unit time spent today 15 minutes  Greater than 50% of total time was spent with the patient and / or family counseling and / or coordination of care  Receptive to listening and learning coping skills for management of symptoms and attending to ADLs  Patient's Rights, confidentiality and exceptions to confidentiality, use of automated medical record, [facility] staff access to medical record, and consent to treatment reviewed      Deep Durand MD

## 2020-03-10 NOTE — PLAN OF CARE
Problem: Alteration in Thoughts and Perception  Goal: Attend and participate in unit activities, including therapeutic, recreational, and educational groups  Description  Interventions:  - Provide therapeutic and educational activities daily, encourage attendance and participation, and document same in the medical record     CERTIFIED PEER SPECIALIST INTERVENTIONS:    Complete peer assessment with patient to assess their needs and identify their goals to complete while in the recovery program as well as once discharged into the community  Patient will complete WRAP Plan, Crisis Plan and [facility]  Patient will attend 50% of groups offered on the unit  Patient will complete a goal card weekly  Outcome: Progressing     Problem: Ineffective Coping  Goal: Identifies healthy coping skills  Outcome: Progressing    Individual has been up and visible in milieu at intervals throughout the shift  Mood has been more pleasant and was free of somatic complaints  Actively participated in programming, attended IMR and treatment team meeting  Compliant with medications Appetite 100% breakfast and 10% lunch  She is scheduled for [facility]pass [facility] tomorrow and working scheduling pass with sister over the weekend  Will continue to monitor

## 2020-03-10 NOTE — PROGRESS NOTES
03/10/20 0956   Team Meeting   Meeting Type Tx Team Meeting   Initial Conference Date 03/10/20   Next Conference Date 03/17/20   Team Members Present   Team Members Present Physician;Nurse;; Other (Discipline and Name)   Physician Team Member Dr Cesario Patterson Team Member Jenniffer Bustamante, RN   Care Management Team Member Brenda Ruiz   Other (Discipline and Name) Samson Staples LCSW   Patient/Family Present   Patient Present Yes   Patient's Family Present No     Patient attended treatment team meeting this morning without her self assessment completed  Patient attended 45% of groups offered last week  Patient reports she is looking forward to her daypass tomorrow at the [facility]  She was also informed she can have a pass with her sister this weekend and encouraged her to call and speak with her about setting up a pass  Team and patient completed risk assessment and the patient did not verbalize any desire to elope from the program  Patient verbalized understanding of consequences of eloping from treatment while on a commitment  Patient verbalized no further questions or concerns at the conclusion of the meeting

## 2020-03-10 NOTE — PROGRESS NOTES
03/10/20 0900   Team Meeting   Meeting Type Daily Rounds   Team Members Present   Team Members Present Physician;Nurse;; Other (Discipline and Name)   Physician Team Member Dr Cesario Patterson Team Member Jenniffer Bustamante RN   Care Management Team Member Brenda Ruiz   Other (Discipline and Name) Samson Staples LCSW     Patient was visible most of the day yesterday  Sister visited yesterday evening  Scheduled for daypass to the [facility] tomorrow  Slept  Problem: Patient Care Overview  Goal: Plan of Care Review  Outcome: Ongoing (interventions implemented as appropriate)   06/01/18 0607   Coping/Psychosocial   Plan of Care Reviewed With patient   Plan of Care Review   Progress improving   OTHER   Outcome Summary pt denies pain at incision, last night c/o pain in L eye but states it feels much better this am, amb in heard, voiding, VSS, drsg changed done once during night     Goal: Individualization and Mutuality  Outcome: Ongoing (interventions implemented as appropriate)    Goal: Discharge Needs Assessment  Outcome: Ongoing (interventions implemented as appropriate)      Problem: Pain, Acute (Adult)  Goal: Identify Related Risk Factors and Signs and Symptoms  Outcome: Outcome(s) achieved Date Met: 06/01/18    Goal: Acceptable Pain Control/Comfort Level  Outcome: Ongoing (interventions implemented as appropriate)

## 2020-03-11 NOTE — PROGRESS NOTES
Progress Note - Rommel Wilkins 1955, 60 y o  female MRN: [id number]    [facility] [employer] [facility] 110-02 Encounter: [id number]    Primary Care Provider: Jaylene England PA-C   Date and time admitted to hospital: 7/23/2019  5:30 PM        * Schizoaffective disorder, bipolar type [facility])  Assessment & Plan  Doing better and about touring the [facility] for 4 hours today  Did take a shower last night and was ready for the same and she agreed to talk to [place] if her sister can also visit today when she is there  In good spirits, well groomed, voices no overt delusions but still with some paranoia about certain staff tampering with her oxygen concentrator and her inhalants and still psychosomatic as usual  Attending required groups and takes showers twice a week and also hoping to go on another pass with her sister as well  No other overt delusions elicited now    Patient seen today before she went on a pass to the personal care home this morning    Current medications:    Current Facility-Administered Medications:     [MAR Hold - Suspended Admission] acetaminophen (TYLENOL) tablet 325 mg, 325 mg, Oral, Q6H PRN, Sindy Day MD    [facility] Hold - Suspended Admission] acetaminophen (TYLENOL) tablet 650 mg, 650 mg, Oral, Q6H PRN, Sindy Day MD    [facility] Hold - Suspended Admission] acetaminophen (TYLENOL) tablet 650 mg, 650 mg, Oral, Q8H PRN, Sindy Day MD    [facility] Hold - Suspended Admission] albuterol (PROVENTIL HFA,VENTOLIN HFA) inhaler 2 puff, 2 puff, Inhalation, Q4H PRN, Sindy Day MD, 2 puff at 10/11/19 0424    [MAR Hold - Suspended Admission] aluminum-magnesium hydroxide-simethicone (MYLANTA) 200-200-20 mg/5 mL oral suspension 15 mL, 15 mL, Oral, Q4H PRN, Sindy Day MD, 15 mL at 01/26/20 1021    [MAR Hold - Suspended Admission] ammonium lactate (LAC-HYDRIN) 12 % lotion 1 application, 1 application, Topical, BID PRN, Sindy Day MD    [facility] Hold - Suspended Admission] benzonatate (TESSALON PERLES) capsule 100 mg, 100 mg, Oral, TID PRN, Alirio Frazier MD    [facility] Hold - Suspended Admission] benztropine (COGENTIN) injection 1 mg, 1 mg, Intramuscular, Q8H PRN, Alirio Frazier MD    [facility] Hold - Suspended Admission] carbamide peroxide (DEBROX) 6 5 % otic solution 5 drop, 5 drop, Left Ear, BID PRN, Remo Nyhan, MD    [facility] Hold - Suspended Admission] cloZAPine (CLOZARIL) tablet 25 mg, 25 mg, Oral, BID, Alirio Frazier MD, 25 mg at 03/10/20 2126    [MAR Hold - Suspended Admission] cloZAPine (CLOZARIL) tablet 50 mg, 50 mg, Oral, BID, Alirio Frazier MD, 50 mg at 03/10/20 2126    [MAR Hold - Suspended Admission] docusate sodium (COLACE) capsule 100 mg, 100 mg, Oral, BID PRN, Alirio Frazier MD    [facility] Hold - Suspended Admission] EPINEPHrine PF (ADRENALIN) 1 mg/mL injection 0 15 mg, 0 15 mg, Intramuscular, Once PRN, Alirio Frazier MD    [facility] Hold - Suspended Admission] fluticasone-vilanterol (BREO ELLIPTA) 200-25 MCG/INH inhaler 1 puff, 1 puff, Inhalation, Daily, Alirio Frazier MD, 1 puff at 03/11/20 0842    [MAR Hold - Suspended Admission] ketotifen (ZADITOR) 0 025 % ophthalmic solution 1 drop, 1 drop, Right Eye, BID PRN, Alirio Frazier MD    [facility] Hold - Suspended Admission] levothyroxine tablet 125 mcg, 125 mcg, Oral, Early Morning, Alirio Frazier MD, 125 mcg at 03/11/20 0600    [MAR Hold - Suspended Admission] magnesium hydroxide (MILK OF MAGNESIA) 400 mg/5 mL oral suspension 30 mL, 30 mL, Oral, Daily PRN, Alirio Frazier MD    [facility] Hold - Suspended Admission] montelukast (SINGULAIR) tablet 10 mg, 10 mg, Oral, HS, Alirio Frazier MD, 10 mg at 02/22/20 2104    [MAR Hold - Suspended Admission] OLANZapine (ZyPREXA) IM injection 5 mg, 5 mg, Intramuscular, Q8H PRN, Alirio Frazier MD    [facility] Hold - Suspended Admission] OLANZapine (ZyPREXA) tablet 5 mg, 5 mg, Oral, Q8H PRN, Alirio Frazier MD    [facility] Hold - Suspended Admission] ondansetron (ZOFRAN-ODT) dispersible tablet 4 mg, 4 mg, Oral, Q6H PRN, Alirio Frazier MD, 4 mg at 12/09/19 1757    [MAR Hold - Suspended Admission] pantoprazole (PROTONIX) EC tablet 40 mg, 40 mg, Oral, Early Morning, Shilpa Trujillo MD, 40 mg at 03/11/20 0600    [MAR Hold - Suspended Admission] polyethylene glycol (MIRALAX) packet 17 g, 17 g, Oral, Daily PRN, Shilpa Trujillo MD    [facility] Hold - Suspended Admission] polyvinyl alcohol (LIQUIFILM TEARS) 1 4 % ophthalmic solution 1 drop, 1 drop, Both Eyes, Q3H PRN, Shilpa Trujillo MD    [facility] Hold - Suspended Admission] sertraline (ZOLOFT) tablet 175 mg, 175 mg, Oral, Daily, Shilpa Trujillo MD, 175 mg at 03/11/20 0841    [MAR Hold - Suspended Admission] sucralfate (CARAFATE) oral suspension 1,000 mg, 1,000 mg, Oral, BID, Cat Torres MD, 1,000 mg at 03/11/20 0600    [MAR Hold - Suspended Admission] theophylline (JEF-24) 24 hr capsule 200 mg, 200 mg, Oral, Daily, Shilpa Trujillo MD, 200 mg at 03/11/20 0841    [MAR Hold - Suspended Admission] tiotropium (SPIRIVA) capsule for inhaler 18 mcg, 18 mcg, Inhalation, Daily, Shilpa Trujillo MD, 18 mcg at 03/11/20 0842    [MAR Hold - Suspended Admission] traZODone (DESYREL) tablet 25 mg, 25 mg, Oral, HS PRN, Shilpa Trujillo MD    Current Outpatient Medications:     acetaminophen (TYLENOL) 325 mg tablet, Take 2 tablets (650 mg total) by mouth every 6 (six) hours as needed for mild pain, Disp: 90 tablet, Rfl: 3    albuterol (PROVENTIL HFA,VENTOLIN HFA) 90 mcg/act inhaler, Inhale 2 puffs every 4 (four) hours as needed for wheezing, Disp: 1 Inhaler, Rfl: 5    cloZAPine (CLOZARIL) 25 mg tablet, Take 3 tablets (75 mg total) by mouth daily at bedtime (Patient not taking: Reported on 8/30/2019), Disp: 90 tablet, Rfl: 0    EPINEPHrine (EPIPEN JR) 0 15 mg/0 3 mL SOAJ, Inject 0 3 mL (0 15 mg total) into a muscle once as needed for anaphylaxis As needed for allergic reaction, Disp: 1 each, Rfl: 0    FLUoxetine (PROzac) 10 mg capsule, Take 1 capsule (10 mg total) by mouth daily, Disp: 30 capsule, Rfl: 0    levothyroxine 125 mcg tablet, Take 1 tablet (125 mcg total) by mouth daily in the early morning, Disp: 30 tablet, Rfl: 0    OXYGEN-HELIUM IN, Inhale 3 L , Disp: , Rfl:     pantoprazole (PROTONIX) 40 mg tablet, Take 1 tablet (40 mg total) by mouth daily in the early morning, Disp: 30 tablet, Rfl: 0    theophylline (JEF-24) 200 MG 24 hr capsule, Take 1 capsule (200 mg total) by mouth daily, Disp: 90 capsule, Rfl: 1  Justification if on more than two antipsychotics: not applicable  Side effects if any: none    Risks , benefits, side effects and precautions of medications discussed with patient and reminded patient to let us know any problems with medications     Objective:     Vital Signs:  Vitals:    03/10/20 0700 03/10/20 0705 03/10/20 1939 03/11/20 0700   BP: 109/75 108/60 108/65 123/58   BP Location: Left arm Left arm Left arm Left arm   Pulse: 86 71 102 83   Resp: 18 18 16 18   Temp: 97 6 °F (36 4 °C)  (!) 97 4 °F (36 3 °C) (!) 97 1 °F (36 2 °C)   TempSrc: Temporal  Temporal Temporal   SpO2: 92%  92%    Weight:       Height:         Appearance:  age appropriate and older than stated age pleasant friendly today with good eye contact hair combed   Behavior:  deviant, evasive and guarded still with a tendency to become suspicious at times   Speech:  delayed, increased latency of response and tangential    Mood:  anxious, euphoric and irritable    Affect:  increased in intensity, increased in range, mood-congruent and redirectable   Thought Process:  circumstantial, concrete, goal directed, illogical and perserverative with stilted speech   Thought Content:  delusions  grandiose and persecutory no current suicidal homicidal thoughts and under plans  No preoccupation with violence  Stills with some psychosomatic symptoms as usual   Still with some suspicion is about certain staff tampering with her oxygen concentrator inhalant    No phobias obsessions or compulsions   Perceptual Disturbances: None    Risk Potential: Inability to care for herself and refusal to take showers regularly   Sensorium:  person, place, time/date, situation, day of week, month of year, year and time   Cognition:  grossly intact no language deficit, aware of current events, no car deficit in recent or remote memory   Consciousness:  alert and awake    Attention: Fair   Intellect: Estimated to be at least within average range   Insight:  Improving   Judgment: fair       Motor Activity: no abnormal movements         Recent Labs:  Results Reviewed     None          I/O Past 24 hours:  No intake/output data recorded  No intake/output data recorded  Assessment / Plan:     Schizoaffective disorder, bipolar type ([facility] [address] )  Overall status:  improving    Reason for continued inpatient care: to assess ability to maintain improvement by attending to ADLs and taking showers regular and see how she does on outing with family after another outing with staff escort next week  Acceptance by patient: accepting now  Hopefulness in recovery: living at the [facility]  Understanding of medications :  yes  Involved in reintegration process: attending groups and has off grounds and off unit privileges   Trusting in relatoinship with psychiatrist:  Prasad Kirby now  Overall status :  No changes  Recommended Treatment:    Medication changes:  1) none today    Non-pharmacological treatments  1) Continue with individual therapy, group therapy, milieu therapy and occupational therapy using recovery principles and psycho-education about accepting illness and need for treatment     2) encourage to take showers twice a week and cooperate with treatment and adl skills as per her behav  plan  3) another therapeutic pass with sister now that she did well today with the assigned staff escort to the park  4) Prepare for the [address] scheduled for next Wednesday as she already had the intake interview and for therapeutic pass with staff tomorrow  Safety  1) Safety/communication plan established targeting dynamic risk factors above   Discharge Plan back to a [facility] at [address] / Coordination of Care    Total floor / unit time spent today 15 minutes  Greater than 50% of total time was spent with the patient and / or family counseling and / or coordination of care  Receptive to listening and learning coping skills for management of symptoms and attending to ADLs  Patient's Rights, confidentiality and exceptions to confidentiality, use of automated medical record, [address] [place] [address] staff access to medical record, and consent to treatment reviewed      Jaz Beasley MD

## 2020-03-11 NOTE — PLAN OF CARE
Problem: Alteration in Thoughts and Perception  Goal: Verbalize thoughts and feelings  Description  Interventions:  - Promote a nonjudgmental and trusting relationship with the patient through active listening and therapeutic communication  - Assess patient's level of functioning, behavior and potential for risk  - Engage patient in 1 on 1 interactions for a minimum of 15 minutes each session  - Encourage patient to express fears, feelings, frustrations, and discuss symptoms    - Refugio patient to reality, help patient recognize reality-based thinking   - Administer medications as ordered and assess for potential side effects  - Provide the patient education related to the signs and symptoms of the illness and desired effects of prescribed medications  Outcome: Progressing  Goal: Agree to be compliant with medication regime, as prescribed and report medication side effects  Description  Interventions:  - Offer appropriate PRN medication and supervise ingestion; conduct aims, as needed   Outcome: Progressing     Problem: Depression  Goal: Refrain from isolation  Description  Interventions:  - Develop a trusting relationship   - Encourage socialization   Outcome: Not Progressing     Problem: Anxiety  Goal: Anxiety is at manageable level  Description  Interventions:  - Assess and monitor patient's anxiety level  - Monitor for signs and symptoms of anxiety both physical and emotional (heart palpitations, chest pain, shortness of breath, headaches, nausea, feeling jumpy, restlessness, irritable, apprehensive)  - Collaborate with interdisciplinary team and initiate plan and interventions as ordered    - Refugio patient to unit/surroundings  - Explain treatment plan  - Encourage participation in care  - Encourage verbalization of concerns/fears  - Identify coping mechanisms  - Assist in developing anxiety-reducing skills  - Administer/offer alternative therapies  - Limit or eliminate stimulants  Outcome: Not Eufemia Gutierrez is disheartened by her trip to the [facility]  Says the room is very small, but more disturbingly, describes it as a corner site w/two large nearly floor to ceiling windows, one on each corner so they face each other, & one overlooks the street  She is very afraid that w/the close quarters, she will fall through a window/feels very unsafe, plus, the car [place] outside window is feeding her "agoraphobia"  We discussed options such as asking [place] to install inside shutter on lower half of windows  Will consider  Is quite churned up about the room, poor appetite, had her Ensure only  Did come up for supper medicine  She is isolative to room & bed

## 2020-03-11 NOTE — ASSESSMENT & PLAN NOTE
Doing better and about touring the [address] [place] [address] for 4 hours today  Did take a shower last night and was ready for the same and she agreed to talk to [place] if her sister can also visit today when she is there  In good spirits, well groomed, voices no overt delusions but still with some paranoia about certain staff tampering with her oxygen concentrator and her inhalants and still psychosomatic as usual  Attending required groups and takes showers twice a week and also hoping to go on another pass with her sister as well  No other overt delusions elicited now    Patient seen today before she went on a pass to the personal [facility] home this morning    Current medications:    Current Facility-Administered Medications:     [MAR Hold - Suspended Admission] acetaminophen (TYLENOL) tablet 325 mg, 325 mg, Oral, Q6H PRN, Chichi Lockett MD    [facility] Hold - Suspended Admission] acetaminophen (TYLENOL) tablet 650 mg, 650 mg, Oral, Q6H PRN, Chichi Lockett MD    [facility] Hold - Suspended Admission] acetaminophen (TYLENOL) tablet 650 mg, 650 mg, Oral, Q8H PRN, Chichi Lockett MD    [facility] Hold - Suspended Admission] albuterol (PROVENTIL HFA,VENTOLIN HFA) inhaler 2 puff, 2 puff, Inhalation, Q4H PRN, Chichi Lockett MD, 2 puff at 10/11/19 0424    [MAR Hold - Suspended Admission] aluminum-magnesium hydroxide-simethicone (MYLANTA) 200-200-20 mg/5 mL oral suspension 15 mL, 15 mL, Oral, Q4H PRN, Chichi Lockett MD, 15 mL at 01/26/20 1021    [MAR Hold - Suspended Admission] ammonium lactate (LAC-HYDRIN) 12 % lotion 1 application, 1 application, Topical, BID PRN, Chichi Lockett MD    [facility] - Suspended Admission] benzonatate (TESSALON PERLES) capsule 100 mg, 100 mg, Oral, TID PRN, Chichi Lockett MD    [facility] Hold - Suspended Admission] benztropine (COGENTIN) injection 1 mg, 1 mg, Intramuscular, Q8H PRN, Chichi Lockett MD    [facility] Hold - Suspended Admission] carbamide peroxide (DEBROX) 6 5 % otic solution 5 drop, 5 drop, Left Ear, BID PRN, MD Roberto Parisi [MAR Hold - Suspended Admission] cloZAPine (CLOZARIL) tablet 25 mg, 25 mg, Oral, BID, Jill Gayle MD, 25 mg at 03/10/20 2126    [MAR Hold - Suspended Admission] cloZAPine (CLOZARIL) tablet 50 mg, 50 mg, Oral, BID, Jill Gayle MD, 50 mg at 03/10/20 2126    [MAR Hold - Suspended Admission] docusate sodium (COLACE) capsule 100 mg, 100 mg, Oral, BID PRN, Jill Gayle MD    [facility] Hold - Suspended Admission] EPINEPHrine PF (ADRENALIN) 1 mg/mL injection 0 15 mg, 0 15 mg, Intramuscular, Once PRN, Jill Gayle MD    [facility] Hold - Suspended Admission] fluticasone-vilanterol (BREO ELLIPTA) 200-25 MCG/INH inhaler 1 puff, 1 puff, Inhalation, Daily, Jill Gayle MD, 1 puff at 03/11/20 0842    [MAR Hold - Suspended Admission] ketotifen (ZADITOR) 0 025 % ophthalmic solution 1 drop, 1 drop, Right Eye, BID PRN, Jill Gayle MD    [facility] Hold - Suspended Admission] levothyroxine tablet 125 mcg, 125 mcg, Oral, Early Morning, Jill Gayle MD, 125 mcg at 03/11/20 0600    [MAR Hold - Suspended Admission] magnesium hydroxide (MILK OF MAGNESIA) 400 mg/5 mL oral suspension 30 mL, 30 mL, Oral, Daily PRN, Jill Gayle MD    [facility] Hold - Suspended Admission] montelukast (SINGULAIR) tablet 10 mg, 10 mg, Oral, HS, Jill Gayle MD, 10 mg at 02/22/20 2104    [MAR Hold - Suspended Admission] OLANZapine (ZyPREXA) IM injection 5 mg, 5 mg, Intramuscular, Q8H PRN, Jill Gayle MD    [facility] Hold - Suspended Admission] OLANZapine (ZyPREXA) tablet 5 mg, 5 mg, Oral, Q8H PRN, Jill Gayle MD    [facility] Hold - Suspended Admission] ondansetron (ZOFRAN-ODT) dispersible tablet 4 mg, 4 mg, Oral, Q6H PRN, Jill Gayle MD, 4 mg at 12/09/19 1757    [MAR Hold - Suspended Admission] pantoprazole (PROTONIX) EC tablet 40 mg, 40 mg, Oral, Early Morning, Jill Gayle MD, 40 mg at 03/11/20 0600    [MAR Hold - Suspended Admission] polyethylene glycol (MIRALAX) packet 17 g, 17 g, Oral, Daily PRN, Jill Gayle MD    [facility] Hold - Suspended Admission] polyvinyl alcohol (LIQUIFILM TEARS) 1 4 % ophthalmic solution 1 drop, 1 drop, Both Eyes, Q3H PRN, Shaggy Rangel MD    [facility] Hold - Suspended Admission] sertraline (ZOLOFT) tablet 175 mg, 175 mg, Oral, Daily, Shaggy Rangel MD, 175 mg at 03/11/20 0841    [MAR Hold - Suspended Admission] sucralfate (CARAFATE) oral suspension 1,000 mg, 1,000 mg, Oral, BID, Cat Torres MD, 1,000 mg at 03/11/20 0600    [MAR Hold - Suspended Admission] theophylline (JEF-24) 24 hr capsule 200 mg, 200 mg, Oral, Daily, Shaggy Rangel MD, 200 mg at 03/11/20 0841    [MAR Hold - Suspended Admission] tiotropium (SPIRIVA) capsule for inhaler 18 mcg, 18 mcg, Inhalation, Daily, Shaggy Rangel MD, 18 mcg at 03/11/20 0842    [MAR Hold - Suspended Admission] traZODone (DESYREL) tablet 25 mg, 25 mg, Oral, HS PRN, Shaggy Rangel MD    Current Outpatient Medications:     acetaminophen (TYLENOL) 325 mg tablet, Take 2 tablets (650 mg total) by mouth every 6 (six) hours as needed for mild pain, Disp: 90 tablet, Rfl: 3    albuterol (PROVENTIL HFA,VENTOLIN HFA) 90 mcg/act inhaler, Inhale 2 puffs every 4 (four) hours as needed for wheezing, Disp: 1 Inhaler, Rfl: 5    cloZAPine (CLOZARIL) 25 mg tablet, Take 3 tablets (75 mg total) by mouth daily at bedtime (Patient not taking: Reported on 8/30/2019), Disp: 90 tablet, Rfl: 0    EPINEPHrine (EPIPEN JR) 0 15 mg/0 3 mL SOAJ, Inject 0 3 mL (0 15 mg total) into a muscle once as needed for anaphylaxis As needed for allergic reaction, Disp: 1 each, Rfl: 0    FLUoxetine (PROzac) 10 mg capsule, Take 1 capsule (10 mg total) by mouth daily, Disp: 30 capsule, Rfl: 0    levothyroxine 125 mcg tablet, Take 1 tablet (125 mcg total) by mouth daily in the early morning, Disp: 30 tablet, Rfl: 0    OXYGEN-HELIUM IN, Inhale 3 L , Disp: , Rfl:     pantoprazole (PROTONIX) 40 mg tablet, Take 1 tablet (40 mg total) by mouth daily in the early morning, Disp: 30 tablet, Rfl: 0    theophylline (JEF-24) 200 MG 24 hr capsule, Take 1 capsule (200 mg total) by mouth daily, Disp: 90 capsule, Rfl: 1  Justification if on more than two antipsychotics: not applicable  Side effects if any: none    Risks , benefits, side effects and precautions of medications discussed with patient and reminded patient to let us know any problems with medications     Objective:     Vital Signs:  Vitals:    03/10/20 0700 03/10/20 0705 03/10/20 1939 03/11/20 0700   BP: 109/75 108/60 108/65 123/58   BP Location: Left arm Left arm Left arm Left arm   Pulse: 86 71 102 83   Resp: 18 18 16 18   Temp: 97 6 °F (36 4 °C)  (!) 97 4 °F (36 3 °C) (!) 97 1 °F (36 2 °C)   TempSrc: Temporal  Temporal Temporal   SpO2: 92%  92%    Weight:       Height:         Appearance:  age appropriate and older than stated age pleasant friendly today with good eye contact hair combed   Behavior:  deviant, evasive and guarded still with a tendency to become suspicious at times   Speech:  delayed, increased latency of response and tangential    Mood:  anxious, euphoric and irritable    Affect:  increased in intensity, increased in range, mood-congruent and redirectable   Thought Process:  circumstantial, concrete, goal directed, illogical and perserverative with stilted speech   Thought Content:  delusions  grandiose and persecutory no current suicidal homicidal thoughts and under plans  No preoccupation with violence  Stills with some psychosomatic symptoms as usual   Still with some suspicion is about certain staff tampering with her oxygen concentrator inhalant    No phobias obsessions or compulsions   Perceptual Disturbances: None    Risk Potential: Inability to care for herself and refusal to take showers regularly   Sensorium:  person, place, time/date, situation, day of week, month of year, year and time   Cognition:  grossly intact no language deficit, aware of current events, no car deficit in recent or remote memory   Consciousness:  alert and awake    Attention: Fair   Intellect: Estimated to be at least within average range   Insight:  Improving   Judgment: fair       Motor Activity: no abnormal movements         Recent Labs:  Results Reviewed     None          I/O Past 24 hours:  No intake/output data recorded  No intake/output data recorded  Assessment / Plan:     Schizoaffective disorder, bipolar type ([address] )  Overall status:  improving    Reason for continued inpatient care: to assess ability to maintain improvement by attending to ADLs and taking showers regular and see how she does on outing with family after another outing with staff escort next week  Acceptance by patient: accepting now  Hopefulness in recovery: living at the [facility] soon  Understanding of medications :  yes  Involved in reintegration process: attending groups and has off grounds and off unit privileges   Trusting in relatoinship with psychiatrist:  Jud Cain now  Overall status :  No changes  Recommended Treatment:    Medication changes:  1) none today    Non-pharmacological treatments  1) Continue with individual therapy, group therapy, milieu therapy and occupational therapy using recovery principles and psycho-education about accepting illness and need for treatment   2) encourage to take showers twice a week and cooperate with treatment and adl skills as per her behav  plan  3) another therapeutic pass with sister now that she did well today with the assigned staff escort [place] the park  4) Prepare for the [address] scheduled for next Wednesday as she already had the intake interview and for therapeutic pass with staff tomorrow  Safety  1) Safety/communication plan established targeting dynamic risk factors above  Discharge Plan back to a [facility] at [address] / Coordination of Care    Total floor / unit time spent today 15 minutes  Greater than 50% of total time was spent with the patient and / or family counseling and / or coordination of care    Receptive to listening and learning coping skills for management of symptoms and attending to ADLs  Patient's Rights, confidentiality and exceptions to confidentiality, use of automated medical record, [facility] staff access to medical record, and consent to treatment reviewed      Meredith Wesley MD

## 2020-03-11 NOTE — PROGRESS NOTES
2140 Maggie did not attend PM Group as asleep in bed  Was awakened by this nurse to do her Incentive Spirometry & did an exemplary job reaching consistent 1250ml volumes  She came up for HS medicine except her Singulair  She had an HS snack of two yogurts  She has specific concerns she wishes to address @ the [facility] tomorrow (I e  Will she have help w/bathing, what kind of outlets vs power strip available for safe use of her compressor & a small fan)  Was encouraged to air concerns, get answers  Wearing now her QHS humidified nasal O2 @ 1L for bed

## 2020-03-11 NOTE — PROGRESS NOTES
03/11/20 0800   Team Meeting   Meeting Type Daily Rounds   Team Members Present   Team Members Present Physician;Nurse;; Other (Discipline and Name)   Physician Team Member Dr Tyra Herrera Team Member Surinder Brewster RN   Care Management Team Member Brenda Irwin   Other (Discipline and Name) Lv Pitts LCSW     Patient is for a tour/daypass today to the [address]  Showered last night  Slept

## 2020-03-11 NOTE — PLAN OF CARE
Problem: Alteration in Thoughts and Perception  Goal: Treatment Goal: Gain control of psychotic behaviors/thinking, reduce/eliminate presenting symptoms and demonstrate improved reality functioning upon discharge  Outcome: Progressing  Goal: Verbalize thoughts and feelings  Description  Interventions:  - Promote a nonjudgmental and trusting relationship with the patient through active listening and therapeutic communication  - Assess patient's level of functioning, behavior and potential for risk  - Engage patient in 1 on 1 interactions for a minimum of 15 minutes each session  - Encourage patient to express fears, feelings, frustrations, and discuss symptoms    - [place] patient to reality, help patient recognize reality-based thinking   - Administer medications as ordered and assess for potential side effects  - Provide the patient education related to the signs and symptoms of the illness and desired effects of prescribed medications  Outcome: Progressing  Goal: Agree to be compliant with medication regime, as prescribed and report medication side effects  Description  Interventions:  - Offer appropriate PRN medication and supervise ingestion; conduct aims, as needed   Outcome: Progressing  Goal: Recognize dysfunctional thoughts, communicate reality-based thoughts at the time of discharge  Description  Interventions:  - Provide medication and psycho-education to assist patient in compliance and developing insight into his/her illness   Outcome: Progressing  Goal: Complete daily ADLs, including personal hygiene independently, as able  Description  Interventions:  - Observe, teach, and assist patient with ADLS  - Monitor and promote a balance of rest/activity, with adequate nutrition and elimination   Outcome: Progressing     Problem: Ineffective Coping  Goal: Patient/Family participate in treatment and DC plans  Description  Interventions:  - Provide therapeutic environment  Outcome: Progressing  Goal: Patient/Family verbalizes awareness of resources  Outcome: Progressing  Goal: Understands least restrictive measures  Description  Interventions:  - Utilize least restrictive behavior  Outcome: Progressing     Problem: Risk for Self Injury/Neglect  Goal: Treatment Goal: Remain safe during length of stay, learn and adopt new coping skills, and be free of self-injurious ideation, impulses and acts at the time of discharge  Outcome: Progressing  Goal: Verbalize thoughts and feelings  Description  Interventions:  - Assess and re-assess patient's lethality and potential for self-injury  - Engage patient in 1:1 interactions, daily, for a minimum of 15 minutes  - Encourage patient to express feelings, fears, frustrations, hopes  - Establish rapport/trust with patient   Outcome: Progressing  Goal: Refrain from harming self  Description  Interventions:  - Monitor patient closely, per order  - Develop a trusting relationship  - Supervise medication ingestion, monitor effects and side effects   Outcome: Progressing  Goal: Complete daily ADLs, including personal hygiene independently, as able  Description  Interventions:  - Observe, teach, and assist patient with ADLS  - Monitor and promote a balance of rest/activity, with adequate nutrition and elimination  Outcome: Progressing     Problem: Depression  Goal: Treatment Goal: Demonstrate behavioral control of depressive symptoms, verbalize feelings of improved mood/affect, and adopt new coping skills prior to discharge  Outcome: Progressing  Goal: Verbalize thoughts and feelings  Description  Interventions:  - Assess and re-assess patient's level of risk   - Engage patient in 1:1 interactions, daily, for a minimum of 15 minutes   - Encourage patient to express feelings, fears, frustrations, hopes   Outcome: Progressing  Goal: Refrain from isolation  Description  Interventions:  - Develop a trusting relationship   - Encourage socialization   Outcome: Progressing  Goal: Refrain from self-neglect  Outcome: Progressing     Problem: Anxiety  Goal: Anxiety is at manageable level  Description  Interventions:  - Assess and monitor patient's anxiety level  - Monitor for signs and symptoms of anxiety both physical and emotional (heart palpitations, chest pain, shortness of breath, headaches, nausea, feeling jumpy, restlessness, irritable, apprehensive)  - Collaborate with interdisciplinary team and initiate plan and interventions as ordered    - [place] patient to unit/surroundings  - Explain treatment plan  - Encourage participation in care  - Encourage verbalization of concerns/fears  - Identify coping mechanisms  - Assist in developing anxiety-reducing skills  - Administer/offer alternative therapies  - Limit or eliminate stimulants  Outcome: Progressing     Problem: Alteration in Orientation  Goal: Interact with staff daily  Description  Interventions:  - Assess and re-assess patient's level of orientation  - Engage patient in 1 on 1 interactions, daily, for a minimum of 15 minutes   - Establish rapport/trust with patient   Outcome: Progressing  Goal: Cooperate with recommended testing/procedures  Description  Interventions:  - Determine need for ancillary testing  - Observe for mental status changes  - Implement falls/precaution protocol   Outcome: Progressing     Problem: PAIN - ADULT  Goal: Verbalizes/displays adequate comfort level or baseline comfort level  Description  Interventions:  - Encourage patient to monitor pain and request assistance  - Assess pain using appropriate pain scale  - Administer analgesics based on type and severity of pain and evaluate response  - Implement non-pharmacological measures as appropriate and evaluate response  - Consider cultural and social influences on pain and pain management  - Notify physician/advanced practitioner if interventions unsuccessful or patient reports new pain  Outcome: Progressing     Problem: SAFETY ADULT  Goal: Patient will remain free of falls  Description  INTERVENTIONS:  - Assess patient frequently for physical needs  -  Identify cognitive and physical deficits and behaviors that affect risk of falls  -  [place] fall precautions as indicated by assessment   - Educate patient/family on patient safety including physical limitations  - Instruct patient to call for assistance with activity based on assessment  - Modify environment to reduce risk of injury  - Consider OT/PT consult to assist with strengthening/mobility  Outcome: Progressing     Problem: Nutrition/Hydration-ADULT  Goal: Nutrient/Hydration intake appropriate for improving, restoring or maintaining nutritional needs  Description  Monitor and assess patient's nutrition/hydration status for malnutrition  Collaborate with interdisciplinary team and initiate plan and interventions as ordered  Monitor patient's weight and dietary intake as ordered or per policy  Utilize nutrition screening tool and intervene as necessary  Determine patient's food preferences and provide high-protein, high-caloric foods as appropriate       INTERVENTIONS:  - Monitor oral intake, urinary output, labs, and treatment plans  - Assess nutrition and hydration status and recommend course of action  - Evaluate amount of meals eaten  - Assist patient with eating if necessary   - Allow adequate time for meals  - Recommend/ encourage appropriate diets, oral nutritional supplements, and vitamin/mineral supplements  - Order, calculate, and assess calorie counts as needed  - Recommend, monitor, and adjust tube feedings and TPN/PPN based on assessed needs  - Assess need for intravenous fluids  - Provide specific nutrition/hydration education as appropriate  - Include patient/family/caregiver in decisions related to nutrition  Outcome: Progressing   Visible intermittently, took her meds, and ate 50% of her breakfast  Mariah Lloyd off-unit to tour the [facility], upon return was cooperative with the body search and in providing a sample for UDS  No issues noted  Continue to monitor

## 2020-03-11 NOTE — PROGRESS NOTES
Sleeping at this time, O2 on 1L via NC, no resp distress noted  Safe and fall precautions in place and maintained   Monitoring continues

## 2020-03-12 NOTE — PROGRESS NOTES
2145 Alpha Mixer talked some more about her experience @ the [place] today  Has much to say that is positive; good food, bright, clean, updated surroundings, a nice handicap shower setup, a guarenttee from staff that will have assistance w/hygiene care  But, the windows instill fear in her  Hoping for options such as a room trade to back of building where was before w/higher & less windows, or, permission to put the bureaus in front of windows  Hoping solutions will be considered  Agrees to address w/ & doctor, but, to lead w/positive impressions first  Did use the Incentive Spirometer achieving consistent volumes of 1250ml  Had an HS snack of 2 yogurts, came up for HS medicine except the Singulair  Wearing now her QHS humidified nasal O2 @ 1L for bed

## 2020-03-12 NOTE — ASSESSMENT & PLAN NOTE
Patient did very well overall during the therapeutic pass while touring the personal care home run at the [address], but complains about the room being too small and being afraid of 2 large windows and she is going to talk it over with the staff there and with the  here  She is still hoping to go on a pass with her sister soon  She is happy and content that she now has a place to go to rather than go over to the [facility] instead  She is trying to take showers twice a week and has been attending groups more often  She is compliant with medications and denies feeling sad or excessively depressed  She is eating well and sleeping well but continues to have occasional paranoia about certain staff tampering with her medications and the oxygen concentrator but they are short lived now  She is still psychosomatic demanding certain tests and being scared of dying from talking or from shortness of breath off from heart attack etc and needs frequent support and reassurance  Compliant with medications with no side effects or problems so far  Eating and sleeping well    She is usually friendly pleasant and polite but continues to talk in a stilted manner as usual    Current medications:    Current Facility-Administered Medications:     acetaminophen (TYLENOL) tablet 325 mg, 325 mg, Oral, Q6H PRN, Shi Pham MD    acetaminophen (TYLENOL) tablet 650 mg, 650 mg, Oral, Q6H PRN, Shi Pham MD    acetaminophen (TYLENOL) tablet 650 mg, 650 mg, Oral, Q8H PRN, Shi Pham MD    albuterol (PROVENTIL HFA,VENTOLIN HFA) inhaler 2 puff, 2 puff, Inhalation, Q4H PRN, Shi Pham MD, 2 puff at 10/11/19 0424    aluminum-magnesium hydroxide-simethicone (MYLANTA) 200-200-20 mg/5 mL oral suspension 15 mL, 15 mL, Oral, Q4H PRN, Shi Pham MD, 15 mL at 01/26/20 1021    ammonium lactate (LAC-HYDRIN) 12 % lotion 1 application, 1 application, Topical, BID PRN, Shi Pham MD    benzonatate (TESSALON PERLES) capsule 100 mg, 100 mg, Oral, TID PRN, Meredith Wesley MD    benztropine (COGENTIN) injection 1 mg, 1 mg, Intramuscular, Q8H PRN, Meredith Wesley MD    carbamide peroxide (DEBROX) 6 5 % otic solution 5 drop, 5 drop, Left Ear, BID PRN, Nataly Sanchez MD    cloZAPine (CLOZARIL) tablet 25 mg, 25 mg, Oral, BID, Meredith Wesley MD, 25 mg at 03/11/20 2126    cloZAPine (CLOZARIL) tablet 50 mg, 50 mg, Oral, BID, Meredith Wesley MD, 50 mg at 03/11/20 2126    docusate sodium (COLACE) capsule 100 mg, 100 mg, Oral, BID PRN, Meredith Wesley MD    EPINEPHrine PF (ADRENALIN) 1 mg/mL injection 0 15 mg, 0 15 mg, Intramuscular, Once PRN, Meredith Wesley MD    fluticasone-vilanterol (BREO ELLIPTA) 200-25 MCG/INH inhaler 1 puff, 1 puff, Inhalation, Daily, Meredith Wesley MD, 1 puff at 03/11/20 0842    ketotifen (ZADITOR) 0 025 % ophthalmic solution 1 drop, 1 drop, Right Eye, BID PRN, Meredith Wesley MD    levothyroxine tablet 125 mcg, 125 mcg, Oral, Early Morning, Meredith Wesley MD, 125 mcg at 03/12/20 0557    magnesium hydroxide (MILK OF MAGNESIA) 400 mg/5 mL oral suspension 30 mL, 30 mL, Oral, Daily PRN, Meredith Wesley MD    montelukast (SINGULAIR) tablet 10 mg, 10 mg, Oral, HS, Meredith Wesley MD, 10 mg at 02/22/20 2104    OLANZapine (ZyPREXA) IM injection 5 mg, 5 mg, Intramuscular, Q8H PRN, Meredith Wesley MD    OLANZapine (ZyPREXA) tablet 5 mg, 5 mg, Oral, Q8H PRN, Meredith Wesley MD    ondansetron (ZOFRAN-ODT) dispersible tablet 4 mg, 4 mg, Oral, Q6H PRN, Meredith Wesley MD, 4 mg at 12/09/19 1757    pantoprazole (PROTONIX) EC tablet 40 mg, 40 mg, Oral, Early Morning, Meredith Wesley MD, 40 mg at 03/12/20 0557    polyethylene glycol (MIRALAX) packet 17 g, 17 g, Oral, Daily PRN, Meredith Wesley MD    polyvinyl alcohol (LIQUIFILM TEARS) 1 4 % ophthalmic solution 1 drop, 1 drop, Both Eyes, Q3H PRN, Meredith Wesley MD    sertraline (ZOLOFT) tablet 175 mg, 175 mg, Oral, Daily, Meredith Wesley MD, 175 mg at 03/11/20 0841    sucralfate (CARAFATE) oral suspension 1,000 mg, 1,000 mg, Oral, BID, Cat Torres MD, 1,000 mg at 03/12/20 0600    theophylline (JEF-24) 24 hr capsule 200 mg, 200 mg, Oral, Daily, Deedee Muir MD, 200 mg at 03/11/20 0841    tiotropium (SPIRIVA) capsule for inhaler 18 mcg, 18 mcg, Inhalation, Daily, Deedee Muir MD, 18 mcg at 03/11/20 0842    traZODone (DESYREL) tablet 25 mg, 25 mg, Oral, HS PRN, Deedee Muir MD  Justification if on more than two antipsychotics: not applicable  Side effects if any: none    Risks , benefits, side effects and precautions of medications discussed with patient and reminded patient to let us know any problems with medications     Objective:     Vital Signs:  Vitals:    03/11/20 1500 03/11/20 1900 03/12/20 0700 03/12/20 0705   BP: 108/62 102/66 99/66 98/58   BP Location: Left arm Left arm Left arm Left arm   Pulse: 87 91 79 70   Resp: 15 15 18 18   Temp: (!) 97 3 °F (36 3 °C) 97 5 °F (36 4 °C) 97 6 °F (36 4 °C)    TempSrc: Temporal Temporal Temporal    SpO2: 98% 93% 93%    Weight:       Height:         Appearance:  age appropriate and older than stated age friendly pleasant better groomed with better eye contact but argumentative and demanding about going to a different room at the personal care home she toured yesterday   Behavior:  deviant, evasive and guarded s still with a tendency to become suspicious at times   Speech:  delayed, increased latency of response and tangential    Mood:  anxious, euphoric and irritable    Affect:  increased in intensity, increased in range, mood-congruent and redirectable   Thought Process:  circumstantial, concrete, goal directed, illogical and perserverative with stilted speech    Thought Content:  delusions  grandiose and persecutory no current suicidal homicidal thoughts intent or plans reported  No preoccupation with violence  No phobias obsessions or compulsions    No overt delusions but does appear to harbor some paranoia about certain staff and still psychosomatic with fear of death and refusing to move into the room at the [facility] that she visited yesterday   Perceptual Disturbances: None    Risk Potential: Inability to care for herself and refusal to take showers regularly   Sensorium:  person, place, time/date, situation, day of week, month of year, year and time   Cognition:  grossly intact no deficit in recent or remote memory, no language deficits, aware of current events   Consciousness:  alert and awake    Attention: Fair   Intellect: Estimated to be at least within average range   Insight:  Improving   Judgment: fair       Motor Activity: no abnormal movements         Recent Labs:  Results Reviewed     None          I/O Past 24 hours:  No intake/output data recorded  No intake/output data recorded  Assessment / Plan:     Schizoaffective disorder, bipolar type ([facility] [address] )  Overall status:  improving    Reason for continued inpatient care: to assess ability to maintain improvement by attending to ADLs and taking showers regular and see how she does on outing with family after another outing with staff escort next week  Acceptance by patient: accepting now  Hopefulness in recovery: living at the [facility] soon  Understanding of medications :  yes  Involved in reintegration process: attending groups and has off grounds and off unit privileges   Trusting in relatoinship with psychiatrist:  Georgia Ruelas now  Overall status :  No changes  Recommended Treatment:    Medication changes:  1) none today    Non-pharmacological treatments  1) Continue with individual therapy, group therapy, milieu therapy and occupational therapy using recovery principles and psycho-education about accepting illness and need for treatment     2) encourage to take showers twice a week and cooperate with treatment and adl skills as per her behav  plan  3) another therapeutic pass with sister now that she did well today with the assigned staff escort [place] the park  4) Prepare for the [facility] and encouraged to accepted rather than refuse it  Safety  1) Safety/communication plan established targeting dynamic risk factors above  Discharge Plan back to a [facility] at [address] / Coordination of Care    Total floor / unit time spent today 15 minutes  Greater than 50% of total time was spent with the patient and / or family counseling and / or coordination of care  Receptive to listening and learning coping skills for management of symptoms and attending to ADLs  Patient's Rights, confidentiality and exceptions to confidentiality, use of automated medical record, [address] [place] [address] staff access to medical record, and consent to treatment reviewed      Meldon Curling, MD

## 2020-03-12 NOTE — PLAN OF CARE
Problem: Alteration in Thoughts and Perception  Goal: Verbalize thoughts and feelings  Description  Interventions:  - Promote a nonjudgmental and trusting relationship with the patient through active listening and therapeutic communication  - Assess patient's level of functioning, behavior and potential for risk  - Engage patient in 1 on 1 interactions for a minimum of 15 minutes each session  - Encourage patient to express fears, feelings, frustrations, and discuss symptoms    - [place] patient to reality, help patient recognize reality-based thinking   - Administer medications as ordered and assess for potential side effects  - Provide the patient education related to the signs and symptoms of the illness and desired effects of prescribed medications  Outcome: Progressing  Goal: Agree to be compliant with medication regime, as prescribed and report medication side effects  Description  Interventions:  - Offer appropriate PRN medication and supervise ingestion; conduct aims, as needed   Outcome: Progressing  Goal: Attend and participate in unit activities, including therapeutic, recreational, and educational groups  Description  Interventions:  - Provide therapeutic and educational activities daily, encourage attendance and participation, and document same in the medical record     CERTIFIED PEER SPECIALIST INTERVENTIONS:    Complete peer assessment with patient to assess their needs and identify their goals to complete while in the recovery program as well as once discharged into the community  Patient will complete WRAP Plan, Crisis Plan and 5 Life Domains  Patient will attend 50% of groups offered on the unit  Patient will complete a goal card weekly      Outcome: Progressing     Problem: Ineffective Coping  Goal: Participates in unit activities  Description  Interventions:  - Provide therapeutic environment   - Provide required programming   - Redirect inappropriate behaviors   Outcome: Progressing  Goal: Patient/Family participate in treatment and DC plans  Description  Interventions:  - Provide therapeutic environment  Outcome: Progressing     Problem: Depression  Goal: Verbalize thoughts and feelings  Description  Interventions:  - Assess and re-assess patient's level of risk   - Engage patient in 1:1 interactions, daily, for a minimum of 15 minutes   - Encourage patient to express feelings, fears, frustrations, hopes   Outcome: Progressing     Problem: PAIN - ADULT  Goal: Verbalizes/displays adequate comfort level or baseline comfort level  Description  Interventions:  - Encourage patient to monitor pain and request assistance  - Assess pain using appropriate pain scale  - Administer analgesics based on type and severity of pain and evaluate response  - Implement non-pharmacological measures as appropriate and evaluate response  - Consider cultural and social influences on pain and pain management  - Notify physician/advanced practitioner if interventions unsuccessful or patient reports new pain  Outcome: Progressing     Problem: SAFETY ADULT  Goal: Patient will remain free of falls  Description  INTERVENTIONS:  - Assess patient frequently for physical needs  -  Identify cognitive and physical deficits and behaviors that affect risk of falls    -  [place] fall precautions as indicated by assessment   - Educate patient/family on patient safety including physical limitations  - Instruct patient to call for assistance with activity based on assessment  - Modify environment to reduce risk of injury  - Consider OT/PT consult to assist with strengthening/mobility  Outcome: Progressing     Problem: Alteration in Thoughts and Perception  Goal: Treatment Goal: Gain control of psychotic behaviors/thinking, reduce/eliminate presenting symptoms and demonstrate improved reality functioning upon discharge  Outcome: Not Progressing  Goal: Recognize dysfunctional thoughts, communicate reality-based thoughts at the time of discharge  Description  Interventions:  - Provide medication and psycho-education to assist patient in compliance and developing insight into his/her illness   Outcome: Not Progressing  Goal: Complete daily ADLs, including personal hygiene independently, as able  Description  Interventions:  - Observe, teach, and assist patient with ADLS  - Monitor and promote a balance of rest/activity, with adequate nutrition and elimination   Outcome: Not Progressing     Problem: Ineffective Coping  Goal: Identifies ineffective coping skills  Outcome: Not Progressing  Goal: Identifies healthy coping skills  Outcome: Not Progressing  Goal: Demonstrates healthy coping skills  Outcome: Not Progressing     Problem: Depression  Goal: Treatment Goal: Demonstrate behavioral control of depressive symptoms, verbalize feelings of improved mood/affect, and adopt new coping skills prior to discharge  Outcome: Not Progressing  Goal: Refrain from isolation  Description  Interventions:  - Develop a trusting relationship   - Encourage socialization   Outcome: Not Progressing  Goal: Refrain from self-neglect  Outcome: Not Progressing     Problem: Anxiety  Goal: Anxiety is at manageable level  Description  Interventions:  - Assess and monitor patient's anxiety level  - Monitor for signs and symptoms of anxiety both physical and emotional (heart palpitations, chest pain, shortness of breath, headaches, nausea, feeling jumpy, restlessness, irritable, apprehensive)  - Collaborate with interdisciplinary team and initiate plan and interventions as ordered  - [place] patient to unit/surroundings  - Explain treatment plan  - Encourage participation in care  - Encourage verbalization of concerns/fears  - Identify coping mechanisms  - Assist in developing anxiety-reducing skills  - Administer/offer alternative therapies  - Limit or eliminate stimulants  Outcome: Not Progressing      Quiet, subdued, withdrawn, isolative to room and self  Med compliant  Had 10% of breakfast   Appears depressed and anxious  Denies thoughts to harm self and others  When asked how she is doing patients reply is "not good" but does not want to elaborate  No c/o s/s pain or discomfort  No somatic complaints  Refused to get out of bed at lunch  Refused lunch  Remains in bed under covers facing wall  Did not attend group    Will continue to monitor progress in recovery program

## 2020-03-12 NOTE — PROGRESS NOTES
03/12/20 0900   Team Meeting   Meeting Type Daily Rounds   Team Members Present   Team Members Present Physician;Nurse;; Other (Discipline and Name)   Physician Team Member Dr Jackie Hernadez Team Member Jesus Angel, LISA   Care Management Team Member Brenda Torre   Other (Discipline and Name) Robert Wasserman, JONATHON     [facility]/daypass yesterday  She reports she is not happy that there are 2 ceiling to floor windows as it makes her fearful of falling out  She also feels there is too much outside, referencing the car junk yard, stating it is feeding her agoraphobia  Slept

## 2020-03-12 NOTE — PROGRESS NOTES
Patient slept through the night, maintained on 1L of oxygen by [place]  She took her morning medications with no issue  Continue to monitor

## 2020-03-12 NOTE — PROGRESS NOTES
Progress Note - Chad Meyer 1955, 60 y o  female MRN: [id number]    [facility]02 Encounter: [id number]    Primary Care Provider: Candance Gamble, PA-C   Date and time admitted to hospital: 7/23/2019  5:30 PM        * Schizoaffective disorder, bipolar type [facility])  Assessment & Plan  Patient did very well overall during the therapeutic pass while touring the personal care home run at the [address] [address], but complains about the room being too small and being afraid of 2 large windows and she is going to talk it over with the staff there and with the  here  She is still hoping to go on a pass with her sister soon  She is happy and content that she now has a place to go to rather than go over to the [facility] instead  She is trying to take showers twice a week and has been attending groups more often  She is compliant with medications and denies feeling sad or excessively depressed  She is eating well and sleeping well but continues to have occasional paranoia about certain staff tampering with her medications and the oxygen concentrator but they are short lived now  She is still psychosomatic demanding certain tests and being scared of dying from talking or from shortness of breath off from heart attack etc and needs frequent support and reassurance  Compliant with medications with no side effects or problems so far  Eating and sleeping well    She is usually friendly pleasant and polite but continues to talk in a stilted manner as usual    Current medications:    Current Facility-Administered Medications:     acetaminophen (TYLENOL) tablet 325 mg, 325 mg, Oral, Q6H PRN, Mynor Terry MD    acetaminophen (TYLENOL) tablet 650 mg, 650 mg, Oral, Q6H PRN, Mynor Terry MD    acetaminophen (TYLENOL) tablet 650 mg, 650 mg, Oral, Q8H PRN, Mynor Terry MD    albuterol (PROVENTIL HFA,VENTOLIN HFA) inhaler 2 puff, 2 puff, Inhalation, Q4H PRN, Mynor Terry MD, 2 puff at 10/11/19 0424   aluminum-magnesium hydroxide-simethicone (MYLANTA) 200-200-20 mg/5 mL oral suspension 15 mL, 15 mL, Oral, Q4H PRN, Meldon Curling, MD, 15 mL at 01/26/20 1021    ammonium lactate (LAC-HYDRIN) 12 % lotion 1 application, 1 application, Topical, BID PRN, Meldon Curling, MD    benzonatate (TESSALON PERLES) capsule 100 mg, 100 mg, Oral, TID PRN, Meldon Curling, MD    benztropine (COGENTIN) injection 1 mg, 1 mg, Intramuscular, Q8H PRN, Meldon Curling, MD    carbamide peroxide (DEBROX) 6 5 % otic solution 5 drop, 5 drop, Left Ear, BID PRN, Jd Urbina MD    cloZAPine (CLOZARIL) tablet 25 mg, 25 mg, Oral, BID, Meldon Curling, MD, 25 mg at 03/11/20 2126    cloZAPine (CLOZARIL) tablet 50 mg, 50 mg, Oral, BID, Meldon Curling, MD, 50 mg at 03/11/20 2126    docusate sodium (COLACE) capsule 100 mg, 100 mg, Oral, BID PRN, Meldon Curling, MD    EPINEPHrine PF (ADRENALIN) 1 mg/mL injection 0 15 mg, 0 15 mg, Intramuscular, Once PRN, Meldon Curling, MD    fluticasone-vilanterol (BREO ELLIPTA) 200-25 MCG/INH inhaler 1 puff, 1 puff, Inhalation, Daily, Meldon Curling, MD, 1 puff at 03/11/20 0842    ketotifen (ZADITOR) 0 025 % ophthalmic solution 1 drop, 1 drop, Right Eye, BID PRN, Meldon Curling, MD    levothyroxine tablet 125 mcg, 125 mcg, Oral, Early Morning, Meldon Curling, MD, 125 mcg at 03/12/20 0557    magnesium hydroxide (MILK OF MAGNESIA) 400 mg/5 mL oral suspension 30 mL, 30 mL, Oral, Daily PRN, Meldon Curling, MD    montelukast (SINGULAIR) tablet 10 mg, 10 mg, Oral, HS, Meldon Curling, MD, 10 mg at 02/22/20 2104    OLANZapine (ZyPREXA) IM injection 5 mg, 5 mg, Intramuscular, Q8H PRN, Meldon Curling, MD    OLANZapine (ZyPREXA) tablet 5 mg, 5 mg, Oral, Q8H PRN, Meldon Curling, MD    ondansetron (ZOFRAN-ODT) dispersible tablet 4 mg, 4 mg, Oral, Q6H PRN, Meldon Curling, MD, 4 mg at 12/09/19 1757    pantoprazole (PROTONIX) EC tablet 40 mg, 40 mg, Oral, Early Morning, Meldon Curling, MD, 40 mg at 03/12/20 0557    polyethylene glycol (MIRALAX) packet 17 g, 17 g, Oral, Daily PRN, Prakash Brewster MD    polyvinyl alcohol (LIQUIFILM TEARS) 1 4 % ophthalmic solution 1 drop, 1 drop, Both Eyes, Q3H PRN, Prakash Brewster MD    sertraline (ZOLOFT) tablet 175 mg, 175 mg, Oral, Daily, Prakash Brewster MD, 175 mg at 03/11/20 0841    sucralfate (CARAFATE) oral suspension 1,000 mg, 1,000 mg, Oral, BID, Cat Torres MD, 1,000 mg at 03/12/20 0600    theophylline (JEF-24) 24 hr capsule 200 mg, 200 mg, Oral, Daily, Prakash Brewster MD, 200 mg at 03/11/20 0841    tiotropium (SPIRIVA) capsule for inhaler 18 mcg, 18 mcg, Inhalation, Daily, Prakash Brewster MD, 18 mcg at 03/11/20 0842    traZODone (DESYREL) tablet 25 mg, 25 mg, Oral, HS PRN, Prakash Brewster MD  Justification if on more than two antipsychotics: not applicable  Side effects if any: none    Risks , benefits, side effects and precautions of medications discussed with patient and reminded patient to let us know any problems with medications     Objective:     Vital Signs:  Vitals:    03/11/20 1500 03/11/20 1900 03/12/20 0700 03/12/20 0705   BP: 108/62 102/66 99/66 98/58   BP Location: Left arm Left arm Left arm Left arm   Pulse: 87 91 79 70   Resp: 15 15 18 18   Temp: (!) 97 3 °F (36 3 °C) 97 5 °F (36 4 °C) 97 6 °F (36 4 °C)    TempSrc: Temporal Temporal Temporal    SpO2: 98% 93% 93%    Weight:       Height:         Appearance:  age appropriate and older than stated age friendly pleasant better groomed with better eye contact but argumentative and demanding about going to a different room at the personal care home she toured yesterday   Behavior:  deviant, evasive and guarded s still with a tendency to become suspicious at times   Speech:  delayed, increased latency of response and tangential    Mood:  anxious, euphoric and irritable    Affect:  increased in intensity, increased in range, mood-congruent and redirectable   Thought Process:  circumstantial, concrete, goal directed, illogical and perserverative with stilted speech    Thought Content:  delusions  grandiose and persecutory no current suicidal homicidal thoughts intent or plans reported  No preoccupation with violence  No phobias obsessions or compulsions  No overt delusions but does appear to harbor some paranoia about certain staff and still psychosomatic with fear of death and refusing to move into the room at the [facility] that she visited yesterday   Perceptual Disturbances: None    Risk Potential: Inability to care for herself and refusal to take showers regularly   Sensorium:  person, place, time/date, situation, day of week, month of year, year and time   Cognition:  grossly intact no deficit in recent or remote memory, no language deficits, aware of current events   Consciousness:  alert and awake    Attention: Fair   Intellect: Estimated to be at least within average range   Insight:  Improving   Judgment: fair       Motor Activity: no abnormal movements         Recent Labs:  Results Reviewed     None          I/O Past 24 hours:  No intake/output data recorded  No intake/output data recorded          Assessment / Plan:     Schizoaffective disorder, bipolar type ([facility]  Overall status:  improving    Reason for continued inpatient care: to assess ability to maintain improvement by attending to ADLs and taking showers regular and see how she does on outing with family after another outing with staff escort next week  Acceptance by patient: accepting now  Hopefulness in recovery: living at the [facility]  Understanding of medications :  yes  Involved in reintegration process: attending groups and has off grounds and off unit privileges   Trusting in relatoinship with psychiatrist:  Remy Carlos now  Overall status :  No changes  Recommended Treatment:    Medication changes:  1) none today    Non-pharmacological treatments  1) Continue with individual therapy, group therapy, milieu therapy and occupational therapy using recovery principles and psycho-education about accepting illness and need for treatment   2) encourage to take showers twice a week and cooperate with treatment and adl skills as per her behav  plan  3) another therapeutic pass with sister now that she did well today with the assigned staff escort to the park  4) Prepare for the [address] [place] [address] Rd and encouraged to accepted rather than refuse it  Safety  1) Safety/communication plan established targeting dynamic risk factors above  Discharge Plan back to a [facility] at [address] / Coordination of Care    Total floor / unit time spent today 15 minutes  Greater than 50% of total time was spent with the patient and / or family counseling and / or coordination of care  Receptive to listening and learning coping skills for management of symptoms and attending to ADLs  Patient's Rights, confidentiality and exceptions to confidentiality, use of automated medical record, [facility] to [facility] record, and consent to treatment reviewed      Sarah Hanna MD

## 2020-03-13 NOTE — PROGRESS NOTES
Progress Note - Roselia Woody 1955, 60 y o  female MRN: [id number]    Unit/Bed#: [facility] 110-02 Encounter: [id number]    Primary Care Provider: Deeanna Apley, PA-C   Date and time admitted to hospital: 7/23/2019  5:30 PM        * Schizoaffective disorder, bipolar type [facility])  Assessment & Plan  Patient is still complaining about the room that she was shown when she visited the [facility] home the other day claiming it is too dangerous because of her 2 windows and it being a corner room and having to look out into a parking lot etc   She states she will try to have the  talked to them if they would switch her room to make her feel safer  She is unrealistic in her demands and appears to be somewhat anxious and continues to have some psychosomatic symptoms  She was looking forward to a pass this weekend but she was told that all passes are on hold administratively because of Corona virus scare in the community  Does appear somewhat disheveled and not very well groomed today and is still somewhat suspicious about certain staff tampering with her oxygen concentrator inhaler  She continues to fear that she is going to die from talking or from shortness of breath or from heart attack  Compliant with medications denies feeling sad or excessively tired and is eating and sleeping well  No side effects reported so far     Current medications:    Current Facility-Administered Medications:     acetaminophen (TYLENOL) tablet 325 mg, 325 mg, Oral, Q6H PRN, Sandhya Bolaños MD    acetaminophen (TYLENOL) tablet 650 mg, 650 mg, Oral, Q6H PRN, Sandhya Bolaños MD    acetaminophen (TYLENOL) tablet 650 mg, 650 mg, Oral, Q8H PRN, Sandhya Bolaños MD    albuterol (PROVENTIL HFA,VENTOLIN HFA) inhaler 2 puff, 2 puff, Inhalation, Q4H PRN, Sandhya Bolaños MD, 2 puff at 10/11/19 0424    aluminum-magnesium hydroxide-simethicone (MYLANTA) 200-200-20 mg/5 mL oral suspension 15 mL, 15 mL, Oral, Q4H PRN, Sandhya Bolaños MD, 15 mL at 01/26/20 1021    ammonium lactate (LAC-HYDRIN) 12 % lotion 1 application, 1 application, Topical, BID PRN, Deep Durand MD    benzonatate (TESSALON PERLES) capsule 100 mg, 100 mg, Oral, TID PRN, Deep Durand MD    benztropine (COGENTIN) injection 1 mg, 1 mg, Intramuscular, Q8H PRN, Deep Durand MD    carbamide peroxide (DEBROX) 6 5 % otic solution 5 drop, 5 drop, Left Ear, BID PRN, Ale Henderson MD    cloZAPine (CLOZARIL) tablet 25 mg, 25 mg, Oral, BID, Deep Durand MD, 25 mg at 03/12/20 2122    cloZAPine (CLOZARIL) tablet 50 mg, 50 mg, Oral, BID, Deep Durand MD, 50 mg at 03/12/20 2120    docusate sodium (COLACE) capsule 100 mg, 100 mg, Oral, BID PRN, Deep Durand MD    EPINEPHrine PF (ADRENALIN) 1 mg/mL injection 0 15 mg, 0 15 mg, Intramuscular, Once PRN, Deep Durand MD    fluticasone-vilanterol (BREO ELLIPTA) 200-25 MCG/INH inhaler 1 puff, 1 puff, Inhalation, Daily, Deep Durand MD, 1 puff at 03/13/20 0855    ketotifen (ZADITOR) 0 025 % ophthalmic solution 1 drop, 1 drop, Right Eye, BID PRN, Deep Durand MD    levothyroxine tablet 125 mcg, 125 mcg, Oral, Early Morning, Deep Durand MD, 125 mcg at 03/13/20 0600    magnesium hydroxide (MILK OF MAGNESIA) 400 mg/5 mL oral suspension 30 mL, 30 mL, Oral, Daily PRN, Deep Durand MD    montelukast (SINGULAIR) tablet 10 mg, 10 mg, Oral, HS, Deep Durand MD, 10 mg at 02/22/20 2104    OLANZapine (ZyPREXA) IM injection 5 mg, 5 mg, Intramuscular, Q8H PRN, Deep Durand MD    OLANZapine (ZyPREXA) tablet 5 mg, 5 mg, Oral, Q8H PRN, Deep Durand MD    ondansetron (ZOFRAN-ODT) dispersible tablet 4 mg, 4 mg, Oral, Q6H PRN, Deep Durand MD, 4 mg at 12/09/19 1757    pantoprazole (PROTONIX) EC tablet 40 mg, 40 mg, Oral, Early Morning, Deep Durand MD, 40 mg at 03/13/20 0601    polyethylene glycol (MIRALAX) packet 17 g, 17 g, Oral, Daily PRN, Deep Durand MD    polyvinyl alcohol (LIQUIFILM TEARS) 1 4 % ophthalmic solution 1 drop, 1 drop, Both Eyes, Q3H PRN, Teri Huggins MD    sertraline (ZOLOFT) tablet 175 mg, 175 mg, Oral, Daily, Teri Huggins MD, 175 mg at 03/13/20 0856    sucralfate (CARAFATE) oral suspension 1,000 mg, 1,000 mg, Oral, BID, Mynor Garrido MD, 1,000 mg at 03/13/20 0601    theophylline (JEF-24) 24 hr capsule 200 mg, 200 mg, Oral, Daily, Teri Huggins MD, 200 mg at 03/13/20 0856    tiotropium (SPIRIVA) capsule for inhaler 18 mcg, 18 mcg, Inhalation, Daily, Teri Huggins MD, 18 mcg at 03/13/20 0855    traZODone (DESYREL) tablet 25 mg, 25 mg, Oral, HS PRN, Teri Huggins MD  Justification if on more than two antipsychotics: not applicable  Side effects if any: none    Risks , benefits, side effects and precautions of medications discussed with patient and reminded patient to let us know any problems with medications     Objective:     Vital Signs:  Vitals:    03/12/20 2015 03/12/20 2029 03/12/20 2200 03/13/20 0700   BP: (!) 87/54 92/56  128/73   BP Location: Left arm Left arm  Right arm   Pulse: 66   95   Resp: 16   18   Temp: 98 4 °F (36 9 °C)   97 8 °F (36 6 °C)   TempSrc: Temporal      SpO2: 92%  94% 93%   Weight:       Height:         Appearance:  age appropriate and older than stated age pleasant friendly better groomed with better eye contact but argumentative anxious demanding to switch the room that she was allotted at the personal care home that she had visited   Behavior:  deviant, evasive and guarded with tendency to become suspicious off and on   Speech:  delayed, increased latency of response and tangential    Mood:  anxious, euphoric and irritable    Affect:  increased in intensity, increased in range, mood-congruent and redirectable   Thought Process:  circumstantial, concrete, goal directed, illogical and perserverative with tendency to have stilted speech    Thought Content:  delusions  grandiose and persecutory no overt delusions elicited other than some suspicion and paranoia about certain staff as usual   No current suicidal homicidal thoughts intent or plans reported  No preoccupation with violence or suicide  Still upset with the room that was [facility] her [facility] claiming it is too small and makes and claustrophobic   Perceptual Disturbances: None    Risk Potential: Inability to care for herself and refusal to take showers regularly   Sensorium:  person, place, time/date, situation, day of week, month of year, year and time   Cognition:  grossly intact aware of current events, no deficit in recent or remote memory, no language deficits   Consciousness:  alert and awake    Attention: Fair   Intellect: Estimated to be at least within average range   Insight:  improving   Judgment: fair       Motor Activity: no abnormal movements         Recent Labs:  Results Reviewed     None          I/O Past 24 hours:  No intake/output data recorded  No intake/output data recorded  Assessment / Plan:     [facility]  Overall status:  improving    Reason for continued inpatient care: to assess ability to maintain improvement by attending to ADLs and taking showers regular and see how she does on outing with family after another outing with staff escort next week  Acceptance by patient: accepting now  Hopefulness in recovery: living at the [facility]  Understanding of medications :  yes  Involved in reintegration process: attending groups and has off grounds and off unit privileges   Trusting in relatoinship with psychiatrist:  Melissa Gerardo now  Overall status :  No changes  Recommended Treatment:    Medication changes:  1) none today    Non-pharmacological treatments  1) Continue with individual therapy, group therapy, milieu therapy and occupational therapy using recovery principles and psycho-education about accepting illness and need for treatment     2) encourage to take showers twice a week and cooperate with treatment and adl skills as per her behav  plan  3) another therapeutic pass with sister now that she did well today with the assigned staff escort to the park  4) Prepare for the [address] [place] [address] Rd and encouraged to accepted rather than refuse it  Safety  1) Safety/communication plan established targeting dynamic risk factors above  Discharge Plan back to a [facility] at [address] / Coordination of Care    Total floor / unit time spent today 15 minutes  Greater than 50% of total time was spent with the patient and / or family counseling and / or coordination of care  Receptive to listening and learning coping skills for management of symptoms and attending to ADLs  Patient's Rights, confidentiality and exceptions to confidentiality, use of automated medical record, [facility]eline staff access to medical record, and consent to treatment reviewed      Manuel Copeland MD

## 2020-03-13 NOTE — PLAN OF CARE
Problem: DISCHARGE PLANNING  Goal: Discharge to home or other facility with appropriate resources  Description  INTERVENTIONS:  - Conduct assessment to determine patient/family and health care team treatment goals, and need for post-acute services based on payer coverage, community resources, and patient preferences, and barriers to discharge  - Address psychosocial, clinical, and financial barriers to discharge as identified in assessment in conjunction with the patient/family and health care team  - Assist the patient in reintegration back into the community by removing barriers which may hinder a successful discharge once deemed stable  - Arrange appropriate level of post-acute services according to patient's needs and preference and payer coverage in collaboration with the physician and health care team  - Communicate with and update the patient/family, physician, and health care team regarding progress on the discharge plan  - Arrange appropriate transportation to post-acute venues    Outcome: Progressing     CM called and left a  for patient's sister, Virginia Webster, notifying her of the restriction on visitations to the behavioral health units implemented today due to the current COVID-19 outbreak  CM instructed Virginia Webster to reach out to the [address] [place] [address], regarding any questions/concerns she may have regarding this situation  CM will continue to follow patient's progress and assist with discharge planning needs

## 2020-03-13 NOTE — PROGRESS NOTES
03/13/20 0900   Team Meeting   Meeting Type Daily Rounds   Team Members Present   Team Members Present Physician;Nurse;; Other (Discipline and Name)   Physician Team Member Dr Abad Julio Team Member Priyank Perez RN   Care Management Team Member Brenda Posadas   Other (Discipline and Name) Khai Batista LCSW     Patient is resistive options for the [employer] and was upset the majority of the day for this reason  Slept

## 2020-03-13 NOTE — PROGRESS NOTES
Maggie maintained on ongoing fall and SAFE precaution  Freeda Pacer in bed with eyes closed, breath even and unlabored   On O2 with humidifier @1L/m via nasal cannula  Continues rounding implemented   No somatic complaint overnight  No PRN needed for sleep aid   No indication of pain or discomfort   No respiratory distress   Will continue to monitor

## 2020-03-13 NOTE — ASSESSMENT & PLAN NOTE
Patient is still complaining about the room that she was shown when she visited the [facility] home the other day claiming it is too dangerous because of her 2 windows and it being a corner room and having to look out into a parking lot etc   She states she will try to have the  talked to them if they would switch her room to make her feel safer  She is unrealistic in her demands and appears to be somewhat anxious and continues to have some psychosomatic symptoms  She was looking forward to a pass this weekend but she was told that all passes are on hold administratively because of Corona virus scare in the community  Does appear somewhat disheveled and not very well groomed today and is still somewhat suspicious about certain staff tampering with her oxygen concentrator inhaler  She continues to fear that she is going to die from talking or from shortness of breath or from heart attack  Compliant with medications denies feeling sad or excessively tired and is eating and sleeping well  No side effects reported so far     Current medications:    Current Facility-Administered Medications:     acetaminophen (TYLENOL) tablet 325 mg, 325 mg, Oral, Q6H PRN, Jeffrey Gallegos MD    acetaminophen (TYLENOL) tablet 650 mg, 650 mg, Oral, Q6H PRN, Jeffrey Gallegos MD    acetaminophen (TYLENOL) tablet 650 mg, 650 mg, Oral, Q8H PRN, Jeffrey Gallegos MD    albuterol (PROVENTIL HFA,VENTOLIN HFA) inhaler 2 puff, 2 puff, Inhalation, Q4H PRN, Jeffrey Gallegos MD, 2 puff at 10/11/19 0424    aluminum-magnesium hydroxide-simethicone (MYLANTA) 200-200-20 mg/5 mL oral suspension 15 mL, 15 mL, Oral, Q4H PRN, Jeffrey Gallegos MD, 15 mL at 01/26/20 1021    ammonium lactate (LAC-HYDRIN) 12 % lotion 1 application, 1 application, Topical, BID PRN, Jeffrey Gallegos MD    benzonatate (TESSALON PERLES) capsule 100 mg, 100 mg, Oral, TID PRN, Jeffrey Gallegos MD    benztropine (COGENTIN) injection 1 mg, 1 mg, Intramuscular, Q8H PRN, Jefrfey Gallegos MD    carbamide peroxide (DEBROX) 6 5 % otic solution 5 drop, 5 drop, Left Ear, BID PRN, Castro Grant MD    cloZAPine (CLOZARIL) tablet 25 mg, 25 mg, Oral, BID, Amina Aparicio MD, 25 mg at 03/12/20 2122    cloZAPine (CLOZARIL) tablet 50 mg, 50 mg, Oral, BID, Amina Aparicio MD, 50 mg at 03/12/20 2120    docusate sodium (COLACE) capsule 100 mg, 100 mg, Oral, BID PRN, Amina Aparicio MD    EPINEPHrine PF (ADRENALIN) 1 mg/mL injection 0 15 mg, 0 15 mg, Intramuscular, Once PRN, Amina Aparicio MD    fluticasone-vilanterol (BREO ELLIPTA) 200-25 MCG/INH inhaler 1 puff, 1 puff, Inhalation, Daily, Amina Aparicio MD, 1 puff at 03/13/20 0855    ketotifen (ZADITOR) 0 025 % ophthalmic solution 1 drop, 1 drop, Right Eye, BID PRN, Amian Aparicio MD    levothyroxine tablet 125 mcg, 125 mcg, Oral, Early Morning, Amina Aparicio MD, 125 mcg at 03/13/20 0600    magnesium hydroxide (MILK OF MAGNESIA) 400 mg/5 mL oral suspension 30 mL, 30 mL, Oral, Daily PRN, Amina Aparicio MD    montelukast (SINGULAIR) tablet 10 mg, 10 mg, Oral, HS, Amina Aparicio MD, 10 mg at 02/22/20 2104    OLANZapine (ZyPREXA) IM injection 5 mg, 5 mg, Intramuscular, Q8H PRN, Aimna Aparicio MD    OLANZapine (ZyPREXA) tablet 5 mg, 5 mg, Oral, Q8H PRN, Amina Aparicio MD    ondansetron (ZOFRAN-ODT) dispersible tablet 4 mg, 4 mg, Oral, Q6H PRN, Amina Aparicio MD, 4 mg at 12/09/19 1757    pantoprazole (PROTONIX) EC tablet 40 mg, 40 mg, Oral, Early Morning, Amina Aparicio MD, 40 mg at 03/13/20 0601    polyethylene glycol (MIRALAX) packet 17 g, 17 g, Oral, Daily PRN, Amina Aparicio MD    polyvinyl alcohol (LIQUIFILM TEARS) 1 4 % ophthalmic solution 1 drop, 1 drop, Both Eyes, Q3H PRN, Amina Aparicio MD    sertraline (ZOLOFT) tablet 175 mg, 175 mg, Oral, Daily, Amina Aparicio MD, 175 mg at 03/13/20 0856    sucralfate (CARAFATE) oral suspension 1,000 mg, 1,000 mg, Oral, BID, Cat Torres MD, 1,000 mg at 03/13/20 0601    theophylline (JEF-24) 24 hr capsule 200 mg, 200 mg, Oral, Daily, Christian Bennett MD, 200 mg at 03/13/20 0856    tiotropium [facility]) capsule for inhaler 18 mcg, 18 mcg, Inhalation, Daily, Christian Bennett MD, 18 mcg at 03/13/20 0855    traZODone (DESYREL) tablet 25 mg, 25 mg, Oral, HS PRN, Christian Bennett MD  Justification if on more than two antipsychotics: not applicable  Side effects if any: none    Risks , benefits, side effects and precautions of medications discussed with patient and reminded patient to let us know any problems with medications     Objective:     Vital Signs:  Vitals:    03/12/20 2015 03/12/20 2029 03/12/20 2200 03/13/20 0700   BP: (!) 87/54 92/56  128/73   BP Location: Left arm Left arm  Right arm   Pulse: 66   95   Resp: 16   18   Temp: 98 4 °F (36 9 °C)   97 8 °F (36 6 °C)   TempSrc: Temporal      SpO2: 92%  94% 93%   Weight:       Height:         Appearance:  age appropriate and older than stated age pleasant friendly better groomed with better eye contact but argumentative anxious demanding to switch the room that she was allotted at the personal care [place] that she had visited   Behavior:  deviant, evasive and guarded with tendency to become suspicious off and on   Speech:  delayed, increased latency of response and tangential    Mood:  anxious, euphoric and irritable    Affect:  increased in intensity, increased in range, mood-congruent and redirectable   Thought Process:  circumstantial, concrete, goal directed, illogical and perserverative with tendency to have stilted speech    Thought Content:  delusions  grandiose and persecutory no overt delusions elicited other than some suspicion and paranoia about certain staff as usual   No current suicidal homicidal thoughts intent or plans reported  No preoccupation with violence or suicide    Still upset with the room that was shown to her at the personal [facility] claiming it is too small and makes and claustrophobic   Perceptual Disturbances: None    Risk Potential: Inability to care for herself and refusal to take showers regularly   Sensorium:  person, place, time/date, situation, day of week, month of year, year and time   Cognition:  grossly intact aware of current events, no deficit in recent or remote memory, no language deficits   Consciousness:  alert and awake    Attention: Fair   Intellect: Estimated to be at least within average range   Insight:  improving   Judgment: fair       Motor Activity: no abnormal movements         Recent Labs:  Results Reviewed     None          I/O Past 24 hours:  No intake/output data recorded  No intake/output data recorded  Assessment / Plan:     Schizoaffective disorder, bipolar type ([facility] )  Overall status:  improving    Reason for continued inpatient care: to assess ability to maintain improvement by attending to ADLs and taking showers regular and see how she does on outing with family after another outing with staff escort next week  Acceptance by patient: accepting now  Hopefulness in recovery: living at the [facility] soon  Understanding of medications :  yes  Involved in reintegration process: attending groups and has off grounds and off unit privileges   Trusting in relatoinship with psychiatrist:  Sergio Tierney now  Overall status :  No changes  Recommended Treatment:    Medication changes:  1) none today    Non-pharmacological treatments  1) Continue with individual therapy, group therapy, milieu therapy and occupational therapy using recovery principles and psycho-education about accepting illness and need for treatment   2) encourage to take showers twice a week and cooperate with treatment and adl skills as per her behav  plan  3) another therapeutic pass with sister now that she did well today with the assigned staff escort to the park  4) Prepare for the [facility] and encouraged to accepted rather than refuse it  Safety  1) Safety/communication plan established targeting dynamic risk factors above    Discharge Plan back to a [facility] / Coordination of Care    Total floor / unit time spent today 15 minutes  Greater than 50% of total time was spent with the patient and / or family counseling and / or coordination of care  Receptive to listening and learning coping skills for management of symptoms and attending to ADLs  Patient's Rights, confidentiality and exceptions to confidentiality, use of automated medical record, J[facility] Patrick staff access to medical record, and consent to treatment reviewed      Isidoro Gonzalez MD

## 2020-03-13 NOTE — PLAN OF CARE
Problem: Alteration in Thoughts and Perception  Goal: Verbalize thoughts and feelings  Description  Interventions:  - Promote a nonjudgmental and trusting relationship with the patient through active listening and therapeutic communication  - Assess patient's level of functioning, behavior and potential for risk  - Engage patient in 1 on 1 interactions for a minimum of 15 minutes each session  - Encourage patient to express fears, feelings, frustrations, and discuss symptoms    - [place] patient to reality, help patient recognize reality-based thinking   - Administer medications as ordered and assess for potential side effects  - Provide the patient education related to the signs and symptoms of the illness and desired effects of prescribed medications  Outcome: Progressing  Goal: Agree to be compliant with medication regime, as prescribed and report medication side effects  Description  Interventions:  - Offer appropriate PRN medication and supervise ingestion; conduct aims, as needed   Outcome: Progressing  Goal: Attend and participate in unit activities, including therapeutic, recreational, and educational groups  Description  Interventions:  - Provide therapeutic and educational activities daily, encourage attendance and participation, and document same in the medical record     CERTIFIED PEER SPECIALIST INTERVENTIONS:    Complete peer assessment with patient to assess their needs and identify their goals to complete while in the recovery program as well as once discharged into the community  Patient will complete WRAP Plan, Crisis Plan and 5 Life Domains  Patient will attend 50% of groups offered on the unit  Patient will complete a goal card weekly      Outcome: Not Progressing  Goal: Recognize dysfunctional thoughts, communicate reality-based thoughts at the time of discharge  Description  Interventions:  - Provide medication and psycho-education to assist patient in compliance and developing insight into his/her illness   Outcome: Progressing     Problem: Ineffective Coping  Goal: Participates in unit activities  Description  Interventions:  - Provide therapeutic environment   - Provide required programming   - Redirect inappropriate behaviors   Outcome: Not Progressing     Problem: Risk for Self Injury/Neglect  Goal: Verbalize thoughts and feelings  Description  Interventions:  - Assess and re-assess patient's lethality and potential for self-injury  - Engage patient in 1:1 interactions, daily, for a minimum of 15 minutes  - Encourage patient to express feelings, fears, frustrations, hopes  - Establish rapport/trust with patient   Outcome: Progressing  Goal: Refrain from harming self  Description  Interventions:  - Monitor patient closely, per order  - Develop a trusting relationship  - Supervise medication ingestion, monitor effects and side effects   Outcome: Progressing  Goal: Attend and participate in unit activities, including therapeutic, recreational, and educational groups  Description  Interventions:  - Provide therapeutic and educational activities daily, encourage attendance and participation, and document same in the medical record  - Obtain collateral information, encourage visitation and family involvement in care   Outcome: Not Progressing     Problem: Depression  Goal: Verbalize thoughts and feelings  Description  Interventions:  - Assess and re-assess patient's level of risk   - Engage patient in 1:1 interactions, daily, for a minimum of 15 minutes   - Encourage patient to express feelings, fears, frustrations, hopes   Outcome: Progressing  Goal: Refrain from isolation  Description  Interventions:  - Develop a trusting relationship   - Encourage socialization   Outcome: Not Progressing     Problem: Anxiety  Goal: Anxiety is at manageable level  Description  Interventions:  - Assess and monitor patient's anxiety level     - Monitor for signs and symptoms of anxiety both physical and emotional (heart palpitations, chest pain, shortness of breath, headaches, nausea, feeling jumpy, restlessness, irritable, apprehensive)  - Collaborate with interdisciplinary team and initiate plan and interventions as ordered  - [place] patient to unit/surroundings  - Explain treatment plan  - Encourage participation in care  - Encourage verbalization of concerns/fears  - Identify coping mechanisms  - Assist in developing anxiety-reducing skills  - Administer/offer alternative therapies  - Limit or eliminate stimulants  Outcome: Not Progressing     Problem: Alteration in Orientation  Goal: Interact with staff daily  Description  Interventions:  - Assess and re-assess patient's level of orientation  - Engage patient in 1 on 1 interactions, daily, for a minimum of 15 minutes   - Establish rapport/trust with patient   Outcome: Progressing     Problem: PAIN - ADULT  Goal: Verbalizes/displays adequate comfort level or baseline comfort level  Description  Interventions:  - Encourage patient to monitor pain and request assistance  - Assess pain using appropriate pain scale  - Administer analgesics based on type and severity of pain and evaluate response  - Implement non-pharmacological measures as appropriate and evaluate response  - Consider cultural and social influences on pain and pain management  - Notify physician/advanced practitioner if interventions unsuccessful or patient reports new pain  Outcome: Progressing     Problem: SAFETY ADULT  Goal: Patient will remain free of falls  Description  INTERVENTIONS:  - Assess patient frequently for physical needs  -  Identify cognitive and physical deficits and behaviors that affect risk of falls    -  [place] fall precautions as indicated by assessment   - Educate patient/family on patient safety including physical limitations  - Instruct patient to call for assistance with activity based on assessment  - Modify environment to reduce risk of injury  - Consider OT/PT consult to assist with strengthening/mobility  Outcome: Progressing     Problem: RESPIRATORY - ADULT  Goal: Achieves optimal ventilation and oxygenation  Description  INTERVENTIONS:  - Assess for changes in respiratory status  - Assess for changes in mentation and behavior  - Position to facilitate oxygenation and minimize respiratory effort  - Oxygen administration by appropriate delivery method based on oxygen saturation (per order) or ABGs  - Initiate smoking cessation education as indicated  - Encourage broncho-pulmonary hygiene including cough, deep breathe, Incentive Spirometry  - Assess the need for suctioning and aspirate as needed  - Assess and instruct to report SOB or any respiratory difficulty  - Respiratory Therapy support as indicated  Outcome: Donna Posey is awake and alert at the onset of the shift  She is pleasant, cooperative but isolated to her room  Continues to be meds and meal compliant  Used the incentive spirometer this evening reaching [id number]  Denies any SOB  SPO2 at 94%RA prior to 1L/m 02 with humidification  Denies any depression or anxiety  No delusion or a/t/v hallucination noted  No behavior noted  Will continue monitor  Maintained on ongoing SAFE precaution

## 2020-03-13 NOTE — PLAN OF CARE
Problem: Alteration in Thoughts and Perception  Goal: Agree to be compliant with medication regime, as prescribed and report medication side effects  Description  Interventions:  - Offer appropriate PRN medication and supervise ingestion; conduct aims, as needed   Outcome: Progressing  Goal: Attend and participate in unit activities, including therapeutic, recreational, and educational groups  Description  Interventions:  - Provide therapeutic and educational activities daily, encourage attendance and participation, and document same in the medical record     CERTIFIED PEER SPECIALIST INTERVENTIONS:    Complete peer assessment with patient to assess their needs and identify their goals to complete while in the recovery program as well as once discharged into the community  Patient will complete WRAP Plan, Crisis Plan and 5 Life Domains  Patient will attend 50% of groups offered on the unit  Patient will complete a goal card weekly      Outcome: Progressing     Problem: Ineffective Coping  Goal: Participates in unit activities  Description  Interventions:  - Provide therapeutic environment   - Provide required programming   - Redirect inappropriate behaviors   Outcome: Progressing  Goal: Patient/Family verbalizes awareness of resources  Outcome: Progressing  Goal: Understands least restrictive measures  Description  Interventions:  - Utilize least restrictive behavior  Outcome: Progressing     Problem: Risk for Self Injury/Neglect  Goal: Treatment Goal: Remain safe during length of stay, learn and adopt new coping skills, and be free of self-injurious ideation, impulses and acts at the time of discharge  Outcome: Progressing  Goal: Refrain from harming self  Description  Interventions:  - Monitor patient closely, per order  - Develop a trusting relationship  - Supervise medication ingestion, monitor effects and side effects   Outcome: Progressing  Goal: Attend and participate in unit activities, including therapeutic, recreational, and educational groups  Description  Interventions:  - Provide therapeutic and educational activities daily, encourage attendance and participation, and document same in the medical record  - Obtain collateral information, encourage visitation and family involvement in care   Outcome: Progressing     Problem: Anxiety  Goal: Anxiety is at manageable level  Description  Interventions:  - Assess and monitor patient's anxiety level  - Monitor for signs and symptoms of anxiety both physical and emotional (heart palpitations, chest pain, shortness of breath, headaches, nausea, feeling jumpy, restlessness, irritable, apprehensive)  - Collaborate with interdisciplinary team and initiate plan and interventions as ordered    - [place] patient to unit/surroundings  - Explain treatment plan  - Encourage participation in care  - Encourage verbalization of concerns/fears  - Identify coping mechanisms  - Assist in developing anxiety-reducing skills  - Administer/offer alternative therapies  - Limit or eliminate stimulants  Outcome: Progressing     Problem: Alteration in Orientation  Goal: Interact with staff daily  Description  Interventions:  - Assess and re-assess patient's level of orientation  - Engage patient in 1 on 1 interactions, daily, for a minimum of 15 minutes   - Establish rapport/trust with patient   Outcome: Progressing     Problem: PAIN - ADULT  Goal: Verbalizes/displays adequate comfort level or baseline comfort level  Description  Interventions:  - Encourage patient to monitor pain and request assistance  - Assess pain using appropriate pain scale  - Administer analgesics based on type and severity of pain and evaluate response  - Implement non-pharmacological measures as appropriate and evaluate response  - Consider cultural and social influences on pain and pain management  - Notify physician/advanced practitioner if interventions unsuccessful or patient reports new pain  Outcome: Progressing     Problem: SAFETY ADULT  Goal: Patient will remain free of falls  Description  INTERVENTIONS:  - Assess patient frequently for physical needs  -  Identify cognitive and physical deficits and behaviors that affect risk of falls    -  [place] fall precautions as indicated by assessment   - Educate patient/family on patient safety including physical limitations  - Instruct patient to call for assistance with activity based on assessment  - Modify environment to reduce risk of injury  - Consider OT/PT consult to assist with strengthening/mobility  Outcome: Progressing     Problem: Alteration in Thoughts and Perception  Goal: Treatment Goal: Gain control of psychotic behaviors/thinking, reduce/eliminate presenting symptoms and demonstrate improved reality functioning upon discharge  Outcome: Not Progressing  Goal: Verbalize thoughts and feelings  Description  Interventions:  - Promote a nonjudgmental and trusting relationship with the patient through active listening and therapeutic communication  - Assess patient's level of functioning, behavior and potential for risk  - Engage patient in 1 on 1 interactions for a minimum of 15 minutes each session  - Encourage patient to express fears, feelings, frustrations, and discuss symptoms    - [place] patient to reality, help patient recognize reality-based thinking   - Administer medications as ordered and assess for potential side effects  - Provide the patient education related to the signs and symptoms of the illness and desired effects of prescribed medications  Outcome: Not Progressing  Goal: Recognize dysfunctional thoughts, communicate reality-based thoughts at the time of discharge  Description  Interventions:  - Provide medication and psycho-education to assist patient in compliance and developing insight into his/her illness   Outcome: Not Progressing  Goal: Complete daily ADLs, including personal hygiene independently, as able  Description  Interventions:  - Observe, teach, and assist patient with ADLS  - Monitor and promote a balance of rest/activity, with adequate nutrition and elimination   Outcome: Not Progressing     Problem: Risk for Self Injury/Neglect  Goal: Verbalize thoughts and feelings  Description  Interventions:  - Assess and re-assess patient's lethality and potential for self-injury  - Engage patient in 1:1 interactions, daily, for a minimum of 15 minutes  - Encourage patient to express feelings, fears, frustrations, hopes  - Establish rapport/trust with patient   Outcome: Not Progressing  Goal: Recognize maladaptive responses and adopt new coping mechanisms  Outcome: Not Progressing  Goal: Complete daily ADLs, including personal hygiene independently, as able  Description  Interventions:  - Observe, teach, and assist patient with ADLS  - Monitor and promote a balance of rest/activity, with adequate nutrition and elimination  Outcome: Not Progressing     Problem: Depression  Goal: Treatment Goal: Demonstrate behavioral control of depressive symptoms, verbalize feelings of improved mood/affect, and adopt new coping skills prior to discharge  Outcome: Not Progressing  Goal: Verbalize thoughts and feelings  Description  Interventions:  - Assess and re-assess patient's level of risk   - Engage patient in 1:1 interactions, daily, for a minimum of 15 minutes   - Encourage patient to express feelings, fears, frustrations, hopes   Outcome: Not Progressing  Goal: Refrain from isolation  Description  Interventions:  - Develop a trusting relationship   - Encourage socialization   Outcome: Not Progressing  Goal: Refrain from self-neglect  Outcome: Not Progressing     Problem: Nutrition/Hydration-ADULT  Goal: Nutrient/Hydration intake appropriate for improving, restoring or maintaining nutritional needs  Description  Monitor and assess patient's nutrition/hydration status for malnutrition   Collaborate with interdisciplinary team and initiate plan and interventions as ordered  Monitor patient's weight and dietary intake as ordered or per policy  Utilize nutrition screening tool and intervene as necessary  Determine patient's food preferences and provide high-protein, high-caloric foods as appropriate  INTERVENTIONS:  - Monitor oral intake, urinary output, labs, and treatment plans  - Assess nutrition and hydration status and recommend course of action  - Evaluate amount of meals eaten  - Assist patient with eating if necessary   - Allow adequate time for meals  - Recommend/ encourage appropriate diets, oral nutritional supplements, and vitamin/mineral supplements  - Order, calculate, and assess calorie counts as needed  - Recommend, monitor, and adjust tube feedings and TPN/PPN based on assessed needs  - Assess need for intravenous fluids  - Provide specific nutrition/hydration education as appropriate  - Include patient/family/caregiver in decisions related to nutrition  Outcome: Not Progressing  Mostly isolative to her room but came out for meds and meals, and attended [facility] group  Ate 50% of breakfast and 10% of lunch  Remains worried and preoccupied in thought about going to the [facility] group home but did not want to elaborate about what her worries were  Has also been anxious about current events r/t the Coronavirus and how it affects her ability to go off unit or have visitors  No other behaviors or issues noted  Continue to monitor

## 2020-03-14 NOTE — PROGRESS NOTES
2130 Maggie performed the Incentive Spirometry reaching mostly 1250ml volumes  She did have an HS snack of 2 yogurts, came out for HS medicine except the Singulair  Wearing now her QHS humidified nasal O2 @ 1L for bed

## 2020-03-14 NOTE — PROGRESS NOTES
Psychiatry Progress Note    Subjective: Interval History     Patient isolative to her room this morning  Reports that she has been having anxiety  Reports that she is anxious as the facility she went to tour she feels has multiple safety issues  Also reports concerns about multiple family members  Patient reports feeling down and sad as result  Denying any suicidal ideations or homicidal ideations  No psychosis  P o  Intake still continues to be poor  Hygiene still inadequate and disheveled in appearance  Has been compliant with medications       Behavior over the last 24 hours:  unchanged  Sleep: normal  Appetite: normal  Medication side effects: No  ROS: no complaints    Current medications:    Current Facility-Administered Medications:     acetaminophen (TYLENOL) tablet 325 mg, 325 mg, Oral, Q6H PRN, Alirio Frazier MD    acetaminophen (TYLENOL) tablet 650 mg, 650 mg, Oral, Q6H PRN, Alirio Frazier MD    acetaminophen (TYLENOL) tablet 650 mg, 650 mg, Oral, Q8H PRN, Alirio Frazier MD    albuterol (PROVENTIL HFA,VENTOLIN HFA) inhaler 2 puff, 2 puff, Inhalation, Q4H PRN, Alirio Frazier MD, 2 puff at 10/11/19 0424    aluminum-magnesium hydroxide-simethicone (MYLANTA) 200-200-20 mg/5 mL oral suspension 15 mL, 15 mL, Oral, Q4H PRN, Alirio Frazier MD, 15 mL at 01/26/20 1021    ammonium lactate (LAC-HYDRIN) 12 % lotion 1 application, 1 application, Topical, BID PRN, Alirio Frazier MD    benzonatate (TESSALON PERLES) capsule 100 mg, 100 mg, Oral, TID PRN, Alirio Frazier MD    benztropine (COGENTIN) injection 1 mg, 1 mg, Intramuscular, Q8H PRN, Alirio Frazier MD    carbamide peroxide (DEBROX) 6 5 % otic solution 5 drop, 5 drop, Left Ear, BID PRN, Remo Nyhan, MD    cloZAPine (CLOZARIL) tablet 25 mg, 25 mg, Oral, BID, Alirio Frazier MD, 25 mg at 03/13/20 2129    cloZAPine (CLOZARIL) tablet 50 mg, 50 mg, Oral, BID, Alirio Frazier MD, 50 mg at 03/13/20 2129    docusate sodium (COLACE) capsule 100 mg, 100 mg, Oral, BID PRN, Deedee Muir MD    EPINEPHrine PF (ADRENALIN) 1 mg/mL injection 0 15 mg, 0 15 mg, Intramuscular, Once PRN, Deedee Muir MD    fluticasone-vilanterol (BREO ELLIPTA) 200-25 MCG/INH inhaler 1 puff, 1 puff, Inhalation, Daily, Deedee uMir MD, 1 puff at 03/14/20 0803    ketotifen (ZADITOR) 0 025 % ophthalmic solution 1 drop, 1 drop, Right Eye, BID PRN, Deedee Muir MD    levothyroxine tablet 125 mcg, 125 mcg, Oral, Early Morning, Deedee Muir MD, 125 mcg at 03/14/20 9875    magnesium hydroxide (MILK OF MAGNESIA) 400 mg/5 mL oral suspension 30 mL, 30 mL, Oral, Daily PRN, Deedee Muir MD    montelukast (SINGULAIR) tablet 10 mg, 10 mg, Oral, HS, Deedee Muir MD, 10 mg at 02/22/20 2104    OLANZapine (ZyPREXA) IM injection 5 mg, 5 mg, Intramuscular, Q8H PRN, Deedee Muir MD    OLANZapine (ZyPREXA) tablet 5 mg, 5 mg, Oral, Q8H PRN, Deedee Muir MD    ondansetron (ZOFRAN-ODT) dispersible tablet 4 mg, 4 mg, Oral, Q6H PRN, Deedee Muir MD, 4 mg at 12/09/19 1757    pantoprazole (PROTONIX) EC tablet 40 mg, 40 mg, Oral, Early Morning, Deedee Muir MD, 40 mg at 03/14/20 4627    polyethylene glycol (MIRALAX) packet 17 g, 17 g, Oral, Daily PRN, Deedee Muir MD    polyvinyl alcohol (LIQUIFILM TEARS) 1 4 % ophthalmic solution 1 drop, 1 drop, Both Eyes, Q3H PRN, Deedee Muir MD    sertraline (ZOLOFT) tablet 175 mg, 175 mg, Oral, Daily, Deedee Muir MD, 175 mg at 03/14/20 0802    sucralfate (CARAFATE) oral suspension 1,000 mg, 1,000 mg, Oral, BID, Cat Torres MD, 1,000 mg at 03/14/20 8399    theophylline (JEF-24) 24 hr capsule 200 mg, 200 mg, Oral, Daily, Deedee Muir MD, 200 mg at 03/14/20 0802    tiotropium (SPIRIVA) capsule for inhaler 18 mcg, 18 mcg, Inhalation, Daily, Deedee Muir MD, 18 mcg at 03/14/20 0803    traZODone (DESYREL) tablet 25 mg, 25 mg, Oral, HS PRN, Deedee Muir MD    Current Problem List:    Patient Active Problem List   Diagnosis    Sepsis ([facility] )    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[facility] )    Tobacco use disorder, continuous    Bipolar disorder (HCC)    Lactic acidosis    Hypokalemia    Hypomagnesemia    Compression fracture of L4 lumbar vertebra    Thoracic compression fracture (HCC)    Ventral hernia    Parapneumonic effusion    Acute on chronic respiratory failure with hypoxia (HCC)    Chronic respiratory failure (HCC)    Hypophosphatemia    Elevated MCV    Schizoaffective disorder, bipolar type (HCC)    Acquired hypothyroidism    Gastroesophageal reflux disease without esophagitis    Abnormal CT of the chest    Excessive cerumen in left ear canal    Lipoma of right upper extremity    Polydipsia    Localized swelling of both lower legs    Noncompliant with deep vein thrombosis (DVT) prophylaxis    Allergic conjunctivitis of right eye    At risk for aspiration       Problem list reviewed 03/14/20     Objective:     Vital Signs:  Vitals:    03/13/20 0700 03/13/20 1600 03/13/20 1942 03/14/20 0700   BP: 128/73 121/58 94/64 118/72   BP Location: Right arm Left arm Right arm Left arm   Pulse: 95 80 75 87   Resp: 18 16 16 18   Temp: 97 8 °F (36 6 °C) (!) 97 °F (36 1 °C) (!) 97 °F (36 1 °C) 97 6 °F (36 4 °C)   TempSrc:  Temporal Temporal Temporal   SpO2: 93% 91% 93%    Weight:       Height:             Appearance:  age appropriate, casually dressed and disheveled   Behavior:  normal   Speech:  normal volume   Mood:  anxious   Affect:  constricted   Thought Process:  normal   Thought Content:  delusions  persecutory   Perceptual Disturbances: None   Risk Potential: none   Sensorium:  person, place and time   Cognition:  intact   Consciousness:  alert and awake    Attention: attention span and concentration were age appropriate   Intellect: average   Insight:  poor   Judgment: poor      Motor Activity: no abnormal movements       I/O Past 24 hours:  No intake/output data recorded  No intake/output data recorded          Labs:  Reviewed 03/14/20    Progress Toward Goals:  unchanged    Assessment / Plan:     Schizoaffective disorder, bipolar type ([facility] )    Recommended Treatment:      Medication changes:  1) continue current treatment plan    Non-pharmacological treatments  1) Continue with group therapy, milieu therapy and occupational therapy  Safety  1) Safety/communication plan established targeting dynamic risk factors above  2) Risks, benefits, and possible side effects of medications explained to patient and patient verbalizes understanding  Counseling / Coordination of Care    Total floor / unit time spent today 20 minutes  Greater than 50% of total time was spent with the patient and / or family counseling and / or coordination of care  A description of the counseling / coordination of care  Patient's Rights, confidentiality and exceptions to confidentiality, use of automated medical record, [facility] staff access to medical record, and consent to treatment reviewed      Jany Stark PA-C

## 2020-03-14 NOTE — PLAN OF CARE
Problem: Alteration in Thoughts and Perception  Goal: Agree to be compliant with medication regime, as prescribed and report medication side effects  Description  Interventions:  - Offer appropriate PRN medication and supervise ingestion; conduct aims, as needed   Outcome: Progressing     Problem: Alteration in Thoughts and Perception  Goal: Attend and participate in unit activities, including therapeutic, recreational, and educational groups  Description  Interventions:  - Provide therapeutic and educational activities daily, encourage attendance and participation, and document same in the medical record     CERTIFIED PEER SPECIALIST INTERVENTIONS:    Complete peer assessment with patient to assess their needs and identify their goals to complete while in the recovery program as well as once discharged into the community  Patient will complete WRAP Plan, Crisis Plan and 5 Life Domains  Patient will attend 50% of groups offered on the unit  Patient will complete a goal card weekly  Outcome: Not Progressing  Goal: Complete daily ADLs, including personal hygiene independently, as able  Description  Interventions:  - Observe, teach, and assist patient with ADLS  - Monitor and promote a balance of rest/activity, with adequate nutrition and elimination   Outcome: Not Progressing     Problem: Depression  Goal: Refrain from isolation  Description  Interventions:  - Develop a trusting relationship   - Encourage socialization   Outcome: Not Progressing     Problem: Anxiety  Goal: Anxiety is at manageable level  Description  Interventions:  - Assess and monitor patient's anxiety level  - Monitor for signs and symptoms of anxiety both physical and emotional (heart palpitations, chest pain, shortness of breath, headaches, nausea, feeling jumpy, restlessness, irritable, apprehensive)  - Collaborate with interdisciplinary team and initiate plan and interventions as ordered    - [place] patient to unit/surroundings  - Explain treatment plan  - Encourage participation in care  - Encourage verbalization of concerns/fears  - Identify coping mechanisms  - Assist in developing anxiety-reducing skills  - Administer/offer alternative therapies  - Limit or eliminate stimulants  Outcome: Not Progressing     Problem: Nutrition/Hydration-ADULT  Goal: Nutrient/Hydration intake appropriate for improving, restoring or maintaining nutritional needs  Description  Monitor and assess patient's nutrition/hydration status for malnutrition  Collaborate with interdisciplinary team and initiate plan and interventions as ordered  Monitor patient's weight and dietary intake as ordered or per policy  Utilize nutrition screening tool and intervene as necessary  Determine patient's food preferences and provide high-protein, high-caloric foods as appropriate  INTERVENTIONS:  - Monitor oral intake, urinary output, labs, and treatment plans  - Assess nutrition and hydration status and recommend course of action  - Evaluate amount of meals eaten  - Assist patient with eating if necessary   - Allow adequate time for meals  - Recommend/ encourage appropriate diets, oral nutritional supplements, and vitamin/mineral supplements  - Order, calculate, and assess calorie counts as needed  - Recommend, monitor, and adjust tube feedings and TPN/PPN based on assessed needs  - Assess need for intravenous fluids  - Provide specific nutrition/hydration education as appropriate  - Include patient/family/caregiver in decisions related to nutrition  Outcome: Not Progressing     [address] Leeann Diego has been isolative to her room & bed in free time tonight  Came out for supper medicine, for meal, but, ate only 25% w/an Ensure  Is anxious, troubled about the room @ the [place] w/no resolution as yet to the 2 large window problem that leaves her feeling frightened & unsafe  She is pleasant, chatted w/peer @ her supper table   Has not addressed hygiene, no shower in 3 days  Did not respond to invitation to PM Group

## 2020-03-14 NOTE — PROGRESS NOTES
Patient asleep at shift onset  Utilizing 1L NC, humidified  No respiratory distress  Slept throughout the night  No behaviors or issues observed  Ongoing monitoring

## 2020-03-14 NOTE — PLAN OF CARE
Problem: Alteration in Thoughts and Perception  Goal: Agree to be compliant with medication regime, as prescribed and report medication side effects  Description  Interventions:  - Offer appropriate PRN medication and supervise ingestion; conduct aims, as needed   Outcome: Progressing  Goal: Attend and participate in unit activities, including therapeutic, recreational, and educational groups  Description  Interventions:  - Provide therapeutic and educational activities daily, encourage attendance and participation, and document same in the medical record     CERTIFIED PEER SPECIALIST INTERVENTIONS:    Complete peer assessment with patient to assess their needs and identify their goals to complete while in the recovery program as well as once discharged into the community  Patient will complete WRAP Plan, Crisis Plan and 5 Life Domains  Patient will attend 50% of groups offered on the unit  Patient will complete a goal card weekly  Outcome: Not Progressing  Goal: Complete daily ADLs, including personal hygiene independently, as able  Description  Interventions:  - Observe, teach, and assist patient with ADLS  - Monitor and promote a balance of rest/activity, with adequate nutrition and elimination   Outcome: Not Progressing     Problem: Alteration in Orientation  Goal: Interact with staff daily  Description  Interventions:  - Assess and re-assess patient's level of orientation  - Engage patient in 1 on 1 interactions, daily, for a minimum of 15 minutes   - Establish rapport/trust with patient   Outcome: Timo Nair has been isolative to her room for the majority of the shift, only coming out of her room for medications and meals  Brightens upon approach but expresses somatic complaints of fatigue indicating that is the reason why she is unable to attend groups  Pleasant during interactions and compliant with her medications  Behaviors controlled  Will continue to monitor for changes

## 2020-03-15 NOTE — PLAN OF CARE
Problem: Alteration in Thoughts and Perception  Goal: Verbalize thoughts and feelings  Description  Interventions:  - Promote a nonjudgmental and trusting relationship with the patient through active listening and therapeutic communication  - Assess patient's level of functioning, behavior and potential for risk  - Engage patient in 1 on 1 interactions for a minimum of 15 minutes each session  - Encourage patient to express fears, feelings, frustrations, and discuss symptoms    - [place] patient to reality, help patient recognize reality-based thinking   - Administer medications as ordered and assess for potential side effects  - Provide the patient education related to the signs and symptoms of the illness and desired effects of prescribed medications  Outcome: Progressing  Goal: Agree to be compliant with medication regime, as prescribed and report medication side effects  Description  Interventions:  - Offer appropriate PRN medication and supervise ingestion; conduct aims, as needed   Outcome: Progressing  Goal: Complete daily ADLs, including personal hygiene independently, as able  Description  Interventions:  - Observe, teach, and assist patient with ADLS  - Monitor and promote a balance of rest/activity, with adequate nutrition and elimination   Outcome: Progressing     Problem: Depression  Goal: Refrain from isolation  Description  Interventions:  - Develop a trusting relationship   - Encourage socialization   Outcome: Progressing     Problem: Nutrition/Hydration-ADULT  Goal: Nutrient/Hydration intake appropriate for improving, restoring or maintaining nutritional needs  Description  Monitor and assess patient's nutrition/hydration status for malnutrition  Collaborate with interdisciplinary team and initiate plan and interventions as ordered  Monitor patient's weight and dietary intake as ordered or per policy  Utilize nutrition screening tool and intervene as necessary   Determine patient's food preferences and provide high-protein, high-caloric foods as appropriate  INTERVENTIONS:  - Monitor oral intake, urinary output, labs, and treatment plans  - Assess nutrition and hydration status and recommend course of action  - Evaluate amount of meals eaten  - Assist patient with eating if necessary   - Allow adequate time for meals  - Recommend/ encourage appropriate diets, oral nutritional supplements, and vitamin/mineral supplements  - Order, calculate, and assess calorie counts as needed  - Recommend, monitor, and adjust tube feedings and TPN/PPN based on assessed needs  - Assess need for intravenous fluids  - Provide specific nutrition/hydration education as appropriate  - Include patient/family/caregiver in decisions related to nutrition  Outcome: Not Progressing     [address] Kathrine Cabello was willing to follow through w/her plan to shower & groom this evening  MHT set her up, assisted her w/hair care & back care while she did the rest to complete task  She ate poorly @ meal, 25%, saying the stuffing & mashed potatoes were too dry, did have the applesauce & Ensure  Came up on own for supper medicine  Sat out in [place] to converse w/staff  Showing appropriate concern for ramifications of visitor restrictions instituted in area facilities to stem spread of COVID-19  Says her brother in law has been hospitalized & wonders if his  will be able to see him/be w/him  Support offered  Resting now in bed as feels so "relaxed" from shower

## 2020-03-15 NOTE — PROGRESS NOTES
2135 Ilda White used the [employer] achieving 1100-1250ml volume  She did have an HS snack, came up for HS medicine w/exception of Singulair  Wearing now her QHS humidified nasal [email or web address] for bed

## 2020-03-15 NOTE — PLAN OF CARE
Problem: Alteration in Thoughts and Perception  Goal: Agree to be compliant with medication regime, as prescribed and report medication side effects  Description  Interventions:  - Offer appropriate PRN medication and supervise ingestion; conduct aims, as needed   Outcome: Progressing  Goal: Attend and participate in unit activities, including therapeutic, recreational, and educational groups  Description  Interventions:  - Provide therapeutic and educational activities daily, encourage attendance and participation, and document same in the medical record     CERTIFIED PEER SPECIALIST INTERVENTIONS:    Complete peer assessment with patient to assess their needs and identify their goals to complete while in the recovery program as well as once discharged into the community  Patient will complete WRAP Plan, Crisis Plan and 5 Life Domains  Patient will attend 50% of groups offered on the unit  Patient will complete a goal card weekly  Outcome: Progressing     Problem: Alteration in Thoughts and Perception  Goal: Complete daily ADLs, including personal hygiene independently, as able  Description  Interventions:  - Observe, teach, and assist patient with ADLS  - Monitor and promote a balance of rest/activity, with adequate nutrition and elimination   Outcome: Not Progressing     Problem: Ineffective Coping  Goal: Demonstrates healthy coping skills  Outcome: Not Progressing     Problem: Depression  Goal: Refrain from isolation  Description  Interventions:  - Develop a trusting relationship   - Encourage socialization   Outcome: Not Progressing     2005 Tu Loser continue to c/o excessive fatigue tonight; isolating in bed asleep most of free time  Did come out for supper medicine, to eat 75% of meal w/an Ensure  During time out in dining room was pleasant, sociable w/peers @ table & w/staff  She has not tonight addressed hygiene; 4 days since last shower   Did not attend [employer] group, but, is now taking part in PM Group

## 2020-03-15 NOTE — PROGRESS NOTES
Patient asleep at shift onset, utilizing 1L O2 via NC  No s/s of respiratory distress  Slept all night  No behaviors or issues observed  Ongoing monitoring

## 2020-03-15 NOTE — PROGRESS NOTES
Psychiatry Progress Note    Subjective: Interval History     Patient continues to be isolative to her room  Disheveled appearance  Continues to endorse ongoing anxiety this morning  Continues to report that she does not feel safe going to the [facility] facility after continuing to express multiple concerns of her safety at the facility  Reporting that she would not go into the room and that she would end of sitting in the hallway and eventually would end up getting sent back to the hospital   Patient reports also having anxiety over ongoing concerns of multiple family members  Still somatic at times during evaluation  Denying any suicidal ideations or homicidal ideations  Has been compliant with medications  Eating meals and consuming ensure      Behavior over the last 24 hours:  unchanged  Sleep: normal  Appetite: normal  Medication side effects: No  ROS: no complaints    Current medications:    Current Facility-Administered Medications:     acetaminophen (TYLENOL) tablet 325 mg, 325 mg, Oral, Q6H PRN, Meldon Curling, MD    acetaminophen (TYLENOL) tablet 650 mg, 650 mg, Oral, Q6H PRN, Meldon Curling, MD    acetaminophen (TYLENOL) tablet 650 mg, 650 mg, Oral, Q8H PRN, Meldon Curling, MD    albuterol (PROVENTIL HFA,VENTOLIN HFA) inhaler 2 puff, 2 puff, Inhalation, Q4H PRN, Meldon Curling, MD, 2 puff at 10/11/19 0424    aluminum-magnesium hydroxide-simethicone (MYLANTA) 200-200-20 mg/5 mL oral suspension 15 mL, 15 mL, Oral, Q4H PRN, Meldon Curling, MD, 15 mL at 01/26/20 1021    ammonium lactate (LAC-HYDRIN) 12 % lotion 1 application, 1 application, Topical, BID PRN, Meldon Curling, MD    benzonatate (TESSALON PERLES) capsule 100 mg, 100 mg, Oral, TID PRN, Meldon Curling, MD    benztropine (COGENTIN) injection 1 mg, 1 mg, Intramuscular, Q8H PRN, Meldon Curling, MD    carbamide peroxide (DEBROX) 6 5 % otic solution 5 drop, 5 drop, Left Ear, BID PRN, Jd Urbina MD    cloZAPine (CLOZARIL) tablet 25 mg, 25 mg, Oral, BID, Marleny Shade Jill Kearns MD, 25 mg at 03/14/20 2137    cloZAPine (CLOZARIL) tablet 50 mg, 50 mg, Oral, BID, Jerome Lopez MD, 50 mg at 03/14/20 2137    docusate sodium (COLACE) capsule 100 mg, 100 mg, Oral, BID PRN, Jerome Lopez MD    EPINEPHrine PF (ADRENALIN) 1 mg/mL injection 0 15 mg, 0 15 mg, Intramuscular, Once PRN, Jerome Lopez MD    fluticasone-vilanterol (BREO ELLIPTA) 200-25 MCG/INH inhaler 1 puff, 1 puff, Inhalation, Daily, Jerome Lopez MD, 1 puff at 03/15/20 0803    ketotifen (ZADITOR) 0 025 % ophthalmic solution 1 drop, 1 drop, Right Eye, BID PRN, Jerome Lopez MD    levothyroxine tablet 125 mcg, 125 mcg, Oral, Early Morning, Jerome Lopez MD, 125 mcg at 03/15/20 0610    magnesium hydroxide (MILK OF MAGNESIA) 400 mg/5 mL oral suspension 30 mL, 30 mL, Oral, Daily PRN, Jerome Lopez MD    montelukast (SINGULAIR) tablet 10 mg, 10 mg, Oral, HS, Jerome Lopez MD, 10 mg at 02/22/20 2104    OLANZapine (ZyPREXA) IM injection 5 mg, 5 mg, Intramuscular, Q8H PRN, Jerome Lopez MD    OLANZapine (ZyPREXA) tablet 5 mg, 5 mg, Oral, Q8H PRN, Jerome Lopez MD    ondansetron (ZOFRAN-ODT) dispersible tablet 4 mg, 4 mg, Oral, Q6H PRN, Jerome Lopez MD, 4 mg at 12/09/19 1757    pantoprazole (PROTONIX) EC tablet 40 mg, 40 mg, Oral, Early Morning, Jerome Lopez MD, 40 mg at 03/15/20 0610    polyethylene glycol (MIRALAX) packet 17 g, 17 g, Oral, Daily PRN, Jerome Lopez MD    polyvinyl alcohol (LIQUIFILM TEARS) 1 4 % ophthalmic solution 1 drop, 1 drop, Both Eyes, Q3H PRN, Jerome Lopez MD    sertraline (ZOLOFT) tablet 175 mg, 175 mg, Oral, Daily, Jerome Lopez MD, 175 mg at 03/15/20 0803    sucralfate (CARAFATE) oral suspension 1,000 mg, 1,000 mg, Oral, BID, Cat Torres MD, 1,000 mg at 03/15/20 7739    theophylline (JEF-24) 24 hr capsule 200 mg, 200 mg, Oral, Daily, Jerome Lopez MD, 200 mg at 03/15/20 0803    tiotropium [facility]) capsule for inhaler 18 mcg, 18 mcg, Inhalation, Daily, Jerome Lopez MD, 18 mcg at 03/15/20 0510   traZODone (DESYREL) tablet 25 mg, 25 mg, Oral, HS PRN, Meldon Curling, MD    Current Problem List:    Patient Active Problem List   Diagnosis    [facility]epsis [facility]    COPD with asthma ([facility] )    Tobacco use disorder, continuous    Bipolar disorder ([facility] )    Lactic acidosis    Hypokalemia    Hypomagnesemia    Compression fracture of L4 lumbar vertebra    Thoracic compression fracture (HCC)    Ventral hernia    Parapneumonic effusion    Acute on chronic respiratory failure with hypoxia (HCC)    Chronic respiratory failure (HCC)    Hypophosphatemia    Elevated MCV    Schizoaffective disorder, bipolar type ([facility] )    Acquired hypothyroidism    Gastroesophageal reflux disease without esophagitis    Abnormal CT of the chest    Excessive cerumen in left ear canal    Lipoma of right upper extremity    Polydipsia    Localized swelling of both lower legs    Noncompliant with deep vein thrombosis (DVT) prophylaxis    Allergic conjunctivitis of right eye    At risk for aspiration       Problem list reviewed 03/15/20     Objective:     Vital Signs:  Vitals:    03/14/20 0700 03/14/20 1612 03/14/20 2008 03/15/20 0730   BP: 118/72 103/56 104/69 114/68   BP Location: Left arm Left arm Left arm Left arm   Pulse: 87 72 82 81   Resp: 18 18 18 18   Temp: 97 6 °F (36 4 °C) 97 6 °F (36 4 °C) 98 6 °F (37 °C) 97 6 °F (36 4 °C)   TempSrc: Temporal Temporal Temporal Temporal   SpO2:  95% 93%    Weight:    66 kg (145 lb 8 oz)   Height:             Appearance:  age appropriate, casually dressed and disheveled   Behavior:  normal   Speech:  normal volume   Mood:  anxious   Affect:  constricted   Thought Process:  normal   Thought Content:  delusions  persecutory   Perceptual Disturbances: None   Risk Potential: none   Sensorium:  person, place and time   Cognition:  intact   Consciousness:  alert and awake    Attention: attention span and concentration were age appropriate   Intellect: average   Insight:  poor   Judgment: poor Motor Activity: no abnormal movements       I/O Past 24 hours:  No intake/output data recorded  No intake/output data recorded  Labs:  Reviewed 03/15/20    Progress Toward Goals:  unchanged    Assessment / Plan:     Schizoaffective disorder, bipolar type ([facility] )    Recommended Treatment:      Medication changes:  1) continue current treatment plan    Non-pharmacological treatments  1) Continue with group therapy, milieu therapy and occupational therapy  Safety  1) Safety/communication plan established targeting dynamic risk factors above  2) Risks, benefits, and possible side effects of medications explained to patient and patient verbalizes understanding  Counseling / Coordination of Care    Total floor / unit time spent today 20 minutes  Greater than 50% of total time was spent with the patient and / or family counseling and / or coordination of care  A description of the counseling / coordination of care  Patient's Rights, confidentiality and exceptions to confidentiality, use of automated medical record, [facility] staff access to medical record, and consent to treatment reviewed      Mercy Che PA-C

## 2020-03-15 NOTE — PLAN OF CARE
Problem: Alteration in Thoughts and Perception  Goal: Agree to be compliant with medication regime, as prescribed and report medication side effects  Description  Interventions:  - Offer appropriate PRN medication and supervise ingestion; conduct aims, as needed   Outcome: Progressing  Goal: Attend and participate in unit activities, including therapeutic, recreational, and educational groups  Description  Interventions:  - Provide therapeutic and educational activities daily, encourage attendance and participation, and document same in the medical record     CERTIFIED PEER SPECIALIST INTERVENTIONS:    Complete peer assessment with patient to assess their needs and identify their goals to complete while in the recovery program as well as once discharged into the community  Patient will complete WRAP Plan, Crisis Plan and 5 Life Domains  Patient will attend 50% of groups offered on the unit  Patient will complete a goal card weekly  Outcome: Not Progressing  Goal: Complete daily ADLs, including personal hygiene independently, as able  Description  Interventions:  - Observe, teach, and assist patient with ADLS  - Monitor and promote a balance of rest/activity, with adequate nutrition and elimination   Outcome: Not Progressing     Problem: Depression  Goal: Refrain from isolation  Description  Interventions:  - Develop a trusting relationship   - Encourage socialization   Outcome: Not Todddelgado Paiz has been isolative to her room for the majority of the shift, only coming out of her room for medications and meals  Brightens upon approach but expresses somatic complaints of fatigue indicating that is the reason why she is unable to attend groups  Pleasant during interactions and compliant with her medications when approached, but declined use of her incentive spirometer  Behaviors controlled  Will continue to monitor for changes

## 2020-03-16 NOTE — PROGRESS NOTES
Progress Note - Nina Bello 1955, 60 y o  female MRN: [id number]    [facility] Encounter: [id number]    Primary Care Provider: Kendrick Schultz PA-C   Date and time admitted to hospital: 7/23/2019  5:30 PM        * Schizoaffective disorder, bipolar type [facility])  Assessment & Plan  Patient is now upset about having to go to the [facility] claiming that she will not go as she things that she is not safe there and does not like the room and she insists that she will be right back if she goes there  She is asking for the room change and the  has already told her that she will try to but cannot guarantee it  She is still psychosomatic and is now upset about the corona virus which has heightened her psychosomatic symptoms  She is eating well and sleeping well  Still suspicious of some of the staff tampering with her inhalant and oxygen concentrator at night  Still scared of dying from shortness of breath or from heart attack or from choking on food  She is isolated staying on her bed most of the time but does make it a point to attend some of the groups  Hygiene grooming is still poor but she is still does take showers every 2 weeks as required  Compliant with medications reports no side effects  No new complaints reported otherwise     Current medications:    Current Facility-Administered Medications:     acetaminophen (TYLENOL) tablet 325 mg, 325 mg, Oral, Q6H PRN, Jill Gayle MD    acetaminophen (TYLENOL) tablet 650 mg, 650 mg, Oral, Q6H PRN, Jill Gayle MD    acetaminophen (TYLENOL) tablet 650 mg, 650 mg, Oral, Q8H PRN, Jill Gayle MD    albuterol (PROVENTIL HFA,VENTOLIN HFA) inhaler 2 puff, 2 puff, Inhalation, Q4H PRN, Jill Gayle MD, 2 puff at 10/11/19 0424    aluminum-magnesium hydroxide-simethicone (MYLANTA) 200-200-20 mg/5 mL oral suspension 15 mL, 15 mL, Oral, Q4H PRN, Jill Gayle MD, 15 mL at 01/26/20 1021    ammonium lactate (LAC-HYDRIN) 12 % lotion 1 application, 1 application, Topical, BID PRN, Jeet Levine MD    benzonatate (TESSALON PERLES) capsule 100 mg, 100 mg, Oral, TID PRN, Jeet Levine MD    benztropine (COGENTIN) injection 1 mg, 1 mg, Intramuscular, Q8H PRN, Jeet Levine MD    carbamide peroxide (DEBROX) 6 5 % otic solution 5 drop, 5 drop, Left Ear, BID PRN, Charly Roberts MD    cloZAPine (CLOZARIL) tablet 25 mg, 25 mg, Oral, BID, Jeet Levine MD, 25 mg at 03/15/20 2125    cloZAPine (CLOZARIL) tablet 50 mg, 50 mg, Oral, BID, Jeet Levine MD, 50 mg at 03/15/20 2125    docusate sodium (COLACE) capsule 100 mg, 100 mg, Oral, BID PRN, Jeet Levine MD    EPINEPHrine PF (ADRENALIN) 1 mg/mL injection 0 15 mg, 0 15 mg, Intramuscular, Once PRN, Jeet Levine MD    fluticasone-vilanterol (BREO ELLIPTA) 200-25 MCG/INH inhaler 1 puff, 1 puff, Inhalation, Daily, Jeet Levine MD, 1 puff at 03/15/20 0803    ketotifen (ZADITOR) 0 025 % ophthalmic solution 1 drop, 1 drop, Right Eye, BID PRN, Jeet Levine MD    levothyroxine tablet 125 mcg, 125 mcg, Oral, Early Morning, Jeet Levine MD, 125 mcg at 03/16/20 0605    magnesium hydroxide (MILK OF MAGNESIA) 400 mg/5 mL oral suspension 30 mL, 30 mL, Oral, Daily PRN, Jeet Levine MD    montelukast (SINGULAIR) tablet 10 mg, 10 mg, Oral, HS, Jeet Levine MD, 10 mg at 02/22/20 2104    OLANZapine (ZyPREXA) IM injection 5 mg, 5 mg, Intramuscular, Q8H PRN, Jeet Levine MD    OLANZapine (ZyPREXA) tablet 5 mg, 5 mg, Oral, Q8H PRN, Jeet Levine MD    ondansetron (ZOFRAN-ODT) dispersible tablet 4 mg, 4 mg, Oral, Q6H PRN, Jeet Levine MD, 4 mg at 12/09/19 1757    pantoprazole (PROTONIX) EC tablet 40 mg, 40 mg, Oral, Early Morning, Jeet Levine MD, 40 mg at 03/16/20 0605    polyethylene glycol (MIRALAX) packet 17 g, 17 g, Oral, Daily PRN, Jeet Levine MD    polyvinyl alcohol (LIQUIFILM TEARS) 1 4 % ophthalmic solution 1 drop, 1 drop, Both Eyes, Q3H PRN, Jeet Levine MD    sertraline (ZOLOFT) tablet 175 mg, 175 mg, Oral, Daily, Shilpa Trujillo MD, 175 mg at 03/15/20 0803    sucralfate (CARAFATE) oral suspension 1,000 mg, 1,000 mg, Oral, BID, Angelica Nageotte, MD, 1,000 mg at 03/16/20 0605    theophylline (JEF-24) 24 hr capsule 200 mg, 200 mg, Oral, Daily, Shilpa Trujillo MD, 200 mg at 03/15/20 0803    tiotropium [facility]) capsule for inhaler 18 mcg, 18 mcg, Inhalation, Daily, Shilpa Trujillo MD, 18 mcg at 03/15/20 0803    traZODone (DESYREL) tablet 25 mg, 25 mg, Oral, HS PRN, Shilpa Trujillo MD  Justification if on more than two antipsychotics: not applicable  Side effects if any: none    Risks , benefits, side effects and precautions of medications discussed with patient and reminded patient to let us know any problems with medications     Objective:     Vital Signs:  Vitals:    03/14/20 2008 03/15/20 0730 03/15/20 1600 03/15/20 2000   BP: 104/69 114/68 102/65 91/59   BP Location: Left arm Left arm Left arm Right arm   Pulse: 82 81 78 65   Resp: 18 18 16 16   Temp: 98 6 °F (37 °C) 97 6 °F (36 4 °C) 97 5 °F (36 4 °C) (!) 97 1 °F (36 2 °C)   TempSrc: Temporal Temporal Temporal Temporal   SpO2: 93%  92% 93%   Weight:  66 kg (145 lb 8 oz)     Height:         Appearance:  age appropriate and older than stated age appears better groomed today but argumentative anxious demanding not to send her to the [facility] personal care home  Behavior:  deviant, evasive and guarded with tendency to become suspicious off and on   Speech:  delayed, increased latency of response and tangential    Mood:  anxious, euphoric and irritable    Affect:  increased in intensity, increased in range, mood-congruent and redirectable   Thought Process:  circumstantial, concrete, goal directed, illogical and perserverative a tendency to have stilted speech   Thought Content:  delusions  grandiose and persecutory no overt delusions elicited other than some suspicion and paranoia about certain staff tampering with her oxygen concentrator in inhalant    Preoccupied with not wanting to go back to the same room at the [facility] at the [facility]  He still psychosomatic with fear of death from choking, shortness of breath or heart attack   Perceptual Disturbances: None    Risk Potential: Inability to care for herself and refusal to take showers regularly   Sensorium:  person, place, time/date, situation, day of week, month of year, year and time   Cognition:  grossly intact no language deficit, aware of current events, no deficit in recent or   Consciousness:  alert and awake    Attention: Fair   Intellect: Estimated to be at least within average range   Insight:  Improving   Judgment: fair       Motor Activity: no abnormal movements         Recent Labs:  Results Reviewed     None          I/O Past 24 hours:  No intake/output data recorded  No intake/output data recorded  Assessment / Plan:     Schizoaffective disorder, bipolar type ([facility] [address] )  Overall status:  improving    Reason for continued inpatient care: to assess ability to maintain improvement by attending to ADLs and taking showers regular and see how she does on outing with family after another outing with staff escort next week  Acceptance by patient: accepting now  Hopefulness in recovery: living at the [facility]  Understanding of medications :  yes  Involved in reintegration process: attending groups and has off grounds and off unit privileges   Trusting in relatoinship with psychiatrist:  Melissa Gerardo now  Overall status :  No changes  Recommended Treatment:    Medication changes:  1) none today    Non-pharmacological treatments  1) Continue with individual therapy, group therapy, milieu therapy and occupational therapy using recovery principles and psycho-education about accepting illness and need for treatment     2) encourage to take showers twice a week and cooperate with treatment and adl skills as per her behav  plan  3) another therapeutic pass with sister now that she did well  with the assigned staff escort to the [place] but it is now on hold due to [address] a virus  4) Prepare for the [address] [place] [address] Rd and encouraged to accepted rather than refuse it  Safety  1) Safety/communication plan established targeting dynamic risk factors above  Discharge Plan back to a [facility] at [address] / Coordination of Care    Total floor / unit time spent today 15 minutes  Greater than 50% of total time was spent with the patient and / or family counseling and / or coordination of care  Receptive to listening and learning coping skills for management of symptoms and attending to ADLs  Patient's Rights, confidentiality and exceptions to confidentiality, use of automated medical record, [facility] adeline staff access to medical record, and consent to treatment reviewed      Jeet Levine MD

## 2020-03-16 NOTE — PROGRESS NOTES
03/13/20 1100   Activity/Group Checklist   Group Other (Comment)  (IM[facility] - Weekly Goal Review)   Attendance Attended   Attendance Duration (min) 46-60   Interactions Disorganized interaction   Affect/Mood Appropriate; Other (Comment)  (Tangential)   Goals Achieved Identified feelings; Identified triggers; Discussed discharge plans; Able to listen to others; Able to engage in interactions; Able to manage/cope with feelings; Able to self-disclose; Able to recieve feedback     Patient attended [facility] - Weekly Goal Review  She was unable to speak about goals she had completed in the last week  Patient was fixated on the "[id number]" windows in her new potential room at [facility]  She reports that due to her "agoraphobia," she will be unable to live in a room like that  Therapist pointed out that the windows in the room where she was sitting had very tall windows as well, and attempted to problem-solve with patient  Patient continuously reported excuses for why the situation would not work, but did not appear to be processing suggestions from therapist or peers  Patient was redirectable nonetheless, and was attentive to peers

## 2020-03-16 NOTE — PROGRESS NOTES
sleeping with no distress noted  O2 on 1L/NC   All precautions in place and maintained at this time   Monitoring continues

## 2020-03-16 NOTE — ASSESSMENT & PLAN NOTE
Patient is now upset about having to go to the [facility] claiming that she will not go as she things that she is not safe there and does not like the room and she insists that she will be right back if she goes there  She is asking for the room change and the  has already told her that she will try to but cannot guarantee it  She is still psychosomatic and is now upset about the corona virus which has heightened her psychosomatic symptoms  She is eating well and sleeping well  Still suspicious of some of the staff tampering with her inhalant and oxygen concentrator at night  Still scared of dying from shortness of breath or from heart attack or from choking on food  She is isolated staying on her bed most of the time but does make it a point to attend some of the groups  Hygiene grooming is still poor but she is still does take showers every 2 weeks as required  Compliant with medications reports no side effects  No new complaints reported otherwise     Current medications:    Current Facility-Administered Medications:     acetaminophen (TYLENOL) tablet 325 mg, 325 mg, Oral, Q6H PRN, Carissa Willis MD    acetaminophen (TYLENOL) tablet 650 mg, 650 mg, Oral, Q6H PRN, Carissa Willis MD    acetaminophen (TYLENOL) tablet 650 mg, 650 mg, Oral, Q8H PRN, Carissa Willis MD    albuterol (PROVENTIL HFA,VENTOLIN HFA) inhaler 2 puff, 2 puff, Inhalation, Q4H PRN, Carissa Willis MD, 2 puff at 10/11/19 0424    aluminum-magnesium hydroxide-simethicone (MYLANTA) 200-200-20 mg/5 mL oral suspension 15 mL, 15 mL, Oral, Q4H PRN, Carissa Willis MD, 15 mL at 01/26/20 1021    ammonium lactate (LAC-HYDRIN) 12 % lotion 1 application, 1 application, Topical, BID PRN, Carissa iWllis MD    benzonatate (TESSALON PERLES) capsule 100 mg, 100 mg, Oral, TID PRN, Carissa Wilils MD    benztropine (COGENTIN) injection 1 mg, 1 mg, Intramuscular, Q8H PRN, Carissa Willis MD    carbamide peroxide (DEBROX) 6 5 % otic solution 5 drop, 5 drop, Left Ear, BID PRN, Santiago Sanchez MD    cloZAPine (CLOZARIL) tablet 25 mg, 25 mg, Oral, BID, Zac Sierra MD, 25 mg at 03/15/20 2125    cloZAPine (CLOZARIL) tablet 50 mg, 50 mg, Oral, BID, Zac Sierra MD, 50 mg at 03/15/20 2125    docusate sodium (COLACE) capsule 100 mg, 100 mg, Oral, BID PRN, Zac Sierra MD    EPINEPHrine PF (ADRENALIN) 1 mg/mL injection 0 15 mg, 0 15 mg, Intramuscular, Once PRN, Zac Sierra MD    fluticasone-vilanterol (BREO ELLIPTA) 200-25 MCG/INH inhaler 1 puff, 1 puff, Inhalation, Daily, Zac Sierra MD, 1 puff at 03/15/20 0803    ketotifen (ZADITOR) 0 025 % ophthalmic solution 1 drop, 1 drop, Right Eye, BID PRN, Zac Sierra MD    levothyroxine tablet 125 mcg, 125 mcg, Oral, Early Morning, Zac Sierra MD, 125 mcg at 03/16/20 0605    magnesium hydroxide (MILK OF MAGNESIA) 400 mg/5 mL oral suspension 30 mL, 30 mL, Oral, Daily PRN, Zac Sierra MD    montelukast (SINGULAIR) tablet 10 mg, 10 mg, Oral, HS, Zac Sierra MD, 10 mg at 02/22/20 2104    OLANZapine (ZyPREXA) IM injection 5 mg, 5 mg, Intramuscular, Q8H PRN, Zac Sierra MD    OLANZapine (ZyPREXA) tablet 5 mg, 5 mg, Oral, Q8H PRN, Zac Sierra MD    ondansetron (ZOFRAN-ODT) dispersible tablet 4 mg, 4 mg, Oral, Q6H PRN, Zac Sierra MD, 4 mg at 12/09/19 1757    pantoprazole (PROTONIX) EC tablet 40 mg, 40 mg, Oral, Early Morning, Zac Sierra MD, 40 mg at 03/16/20 0605    polyethylene glycol (MIRALAX) packet 17 g, 17 g, Oral, Daily PRN, Zac Sierra MD    polyvinyl alcohol (LIQUIFILM TEARS) 1 4 % ophthalmic solution 1 drop, 1 drop, Both Eyes, Q3H PRN, Zac Sierra MD    sertraline (ZOLOFT) tablet 175 mg, 175 mg, Oral, Daily, Zac Sierra MD, 175 mg at 03/15/20 0803    sucralfate (CARAFATE) oral suspension 1,000 mg, 1,000 mg, Oral, BID, Cat Torres MD, 1,000 mg at 03/16/20 0605    theophylline (JEF-24) 24 hr capsule 200 mg, 200 mg, Oral, Daily, Zac Sierra MD, 200 mg at 03/15/20 0803    tiotropium [facility]) capsule for inhaler 18 mcg, 18 mcg, Inhalation, Daily, Sarah Hanna MD, 18 mcg at 03/15/20 0803    traZODone (DESYREL) tablet 25 mg, 25 mg, Oral, HS PRN, Sarah Hanna MD  Justification if on more than two antipsychotics: not applicable  Side effects if any: none    Risks , benefits, side effects and precautions of medications discussed with patient and reminded patient to let us know any problems with medications     Objective:     Vital Signs:  Vitals:    03/14/20 2008 03/15/20 0730 03/15/20 1600 03/15/20 2000   BP: 104/69 114/68 102/65 91/59   BP Location: Left arm Left arm Left arm Right arm   Pulse: 82 81 78 65   Resp: 18 18 16 16   Temp: 98 6 °F (37 °C) 97 6 °F (36 4 °C) 97 5 °F (36 4 °C) (!) 97 1 °F (36 2 °C)   TempSrc: Temporal Temporal Temporal Temporal   SpO2: 93%  92% 93%   Weight:  66 kg (145 lb 8 oz)     Height:         Appearance:  age appropriate and older than stated age appears better groomed today but argumentative anxious demanding not to send her to the [facility]  Behavior:  deviant, evasive and guarded with tendency to become suspicious off and on   Speech:  delayed, increased latency of response and tangential    Mood:  anxious, euphoric and irritable    Affect:  increased in intensity, increased in range, mood-congruent and redirectable   Thought Process:  circumstantial, concrete, goal directed, illogical and perserverative a tendency to have stilted speech   Thought Content:  delusions  grandiose and persecutory no overt delusions elicited other than some suspicion and paranoia about certain staff tampering with her oxygen concentrator in inhalant  Preoccupied with not wanting to go back to the same room at the [facility] home    He still psychosomatic with fear of death from choking, shortness of breath or heart attack   Perceptual Disturbances: None    Risk Potential: Inability to care for herself and refusal to take showers regularly   Sensorium: person, place, time/date, situation, day of week, month of year, year and time   Cognition:  grossly intact no language deficit, aware of current events, no deficit in recent or   Consciousness:  alert and awake    Attention: Fair   Intellect: Estimated to be at least within average range   Insight:  Improving   Judgment: fair       Motor Activity: no abnormal movements         Recent Labs:  Results Reviewed     None          I/O Past 24 hours:  No intake/output data recorded  No intake/output data recorded  Assessment / Plan:     Schizoaffective disorder, bipolar type ([facility] [address] )  Overall status:  improving    Reason for continued inpatient care: to assess ability to maintain improvement by attending to ADLs and taking showers regular and see how she does on outing with family after another outing with staff escort next week  Acceptance by patient: accepting now  Hopefulness in recovery: living at the [facility] soon  Understanding of medications :  yes  Involved in reintegration process: attending groups and has off grounds and off unit privileges   Trusting in relatoinship with psychiatrist:  Sheri Randall now  Overall status :  No changes  Recommended Treatment:    Medication changes:  1) none today    Non-pharmacological treatments  1) Continue with individual therapy, group therapy, milieu therapy and occupational therapy using recovery principles and psycho-education about accepting illness and need for treatment   2) encourage to take showers twice a week and cooperate with treatment and adl skills as per her behav  plan  3) another therapeutic pass with sister now that she did well  [place] but [place] is now on hold due to [address] a virus  4) Prepare for the [facility] and encouraged to accepted rather than refuse it  Safety  1) Safety/communication plan established targeting dynamic risk factors above    Discharge Plan back to a [facility] / Coordination of Care    Total floor / unit time spent today 15 minutes  Greater than 50% of total time was spent with the patient and / or family counseling and / or coordination of care  Receptive to listening and learning coping skills for management of symptoms and attending to ADLs  Patient's Rights, confidentiality and exceptions to confidentiality, use of automated medical record, Jeb Titus adeline staff access to medical record, and consent to treatment reviewed      Felisa Florecne MD

## 2020-03-16 NOTE — PROGRESS NOTES
03/16/20 0830   Team Meeting   Meeting Type Daily Rounds   Team Members Present   Team Members Present Physician;Nurse;   Physician Team Member Dr Jeannette Jiang Team Member Arnaud Gutierrez RN   Care Management Team Member Brenda Hay     Patient has been focused on the [employer], stating she cannot go to the facility because of the room  Preoccupied about coronavirus  Slept

## 2020-03-16 NOTE — PROGRESS NOTES
[address] Rod took part in PM Group, performed her Incentive Spirometry achieving 1100-1250ml volume  Verbal about being unable to go to the [facility] unless the window situation is resolved  Plans to follow up tomorrow w/ about whether she has contacted staff there about alternatives (move bureaus in front of windows, room change)  She did have an HS snack, came up for HS medicine except the Singulair  Wearing now her QHs humidified nasal O2 @ 1L for bed

## 2020-03-16 NOTE — PLAN OF CARE
Problem: Alteration in Thoughts and Perception  Goal: Agree to be compliant with medication regime, as prescribed and report medication side effects  Description  Interventions:  - Offer appropriate PRN medication and supervise ingestion; conduct aims, as needed   Outcome: Progressing  Goal: Complete daily ADLs, including personal hygiene independently, as able  Description  Interventions:  - Observe, teach, and assist patient with ADLS  - Monitor and promote a balance of rest/activity, with adequate nutrition and elimination   Outcome: Progressing     Problem: Ineffective Coping  Goal: Participates in unit activities  Description  Interventions:  - Provide therapeutic environment   - Provide required programming   - Redirect inappropriate behaviors   Outcome: Progressing     Problem: Depression  Goal: Refrain from isolation  Description  Interventions:  - Develop a trusting relationship   - Encourage socialization   Outcome: Miracle Garcia has been isolative to her room for the majority of the shift, only coming out of her room for medications and meals  Brightens upon approach but expresses somatic complaints of fatigue  Pleasant during interactions and compliant with her medications when approached, but declined use of her incentive spirometer  Behaviors controlled  Will continue to monitor for changes

## 2020-03-17 NOTE — PLAN OF CARE
Problem: Alteration in Thoughts and Perception  Goal: Verbalize thoughts and feelings  Description  Interventions:  - Promote a nonjudgmental and trusting relationship with the patient through active listening and therapeutic communication  - Assess patient's level of functioning, behavior and potential for risk  - Engage patient in 1 on 1 interactions for a minimum of 15 minutes each session  - Encourage patient to express fears, feelings, frustrations, and discuss symptoms    - [place] patient to reality, help patient recognize reality-based thinking   - Administer medications as ordered and assess for potential side effects  - Provide the patient education related to the signs and symptoms of the illness and desired effects of prescribed medications  Outcome: Progressing  Goal: Agree to be compliant with medication regime, as prescribed and report medication side effects  Description  Interventions:  - Offer appropriate PRN medication and supervise ingestion; conduct aims, as needed   Outcome: Progressing     Problem: Alteration in Thoughts and Perception  Goal: Attend and participate in unit activities, including therapeutic, recreational, and educational groups  Description  Interventions:  - Provide therapeutic and educational activities daily, encourage attendance and participation, and document same in the medical record     CERTIFIED PEER SPECIALIST INTERVENTIONS:    Complete peer assessment with patient to assess their needs and identify their goals to complete while in the recovery program as well as once discharged into the community  Patient will complete WRAP Plan, Crisis Plan and 5 Life Domains  Patient will attend 50% of groups offered on the unit  Patient will complete a goal card weekly      Outcome: Not Progressing     Problem: Depression  Goal: Refrain from isolation  Description  Interventions:  - Develop a trusting relationship   - Encourage socialization   Outcome: Not Progressing Problem: Anxiety  Goal: Anxiety is at manageable level  Description  Interventions:  - Assess and monitor patient's anxiety level  - Monitor for signs and symptoms of anxiety both physical and emotional (heart palpitations, chest pain, shortness of breath, headaches, nausea, feeling jumpy, restlessness, irritable, apprehensive)  - Collaborate with interdisciplinary team and initiate plan and interventions as ordered  - [place] patient to unit/surroundings  - Explain treatment plan  - Encourage participation in care  - Encourage verbalization of concerns/fears  - Identify coping mechanisms  - Assist in developing anxiety-reducing skills  - Administer/offer alternative therapies  - Limit or eliminate stimulants  Outcome: Not Progressing     [address] is preoccupied w/the [place] setting; anything positive she had to say about it when toured has been replaced a/negative, even paranoid perceptions  Now saying there is no fire escape & someone could just push her out the fire door & claim she fell, saying will not be given all her medicines or the correct ones  Minimizes handicap bathroom as barely adequate  Saying cannot go there, especially because of the too large windows about which she worries she will fall out  Was encouraged to discuss misgivings in Treatment Team tomorrow  Did perform her Incentive Spirometer & achieved volumes of 1250ml  Did come up for her scheduled medicine w/exception of Singulair  Ate poorly, 25% of meal w/an Ensure, but, did have HS snack  Did not attend PM Group  Has been isolating in room most of free time  Wearing now her QHS humidified nasal O2 @ 1L for bed

## 2020-03-17 NOTE — PROGRESS NOTES
Maggie maintained on ongoing fall and SAFE precaution  Marlise Pallas in bed with eyes closed, breath even and unlabored   On O2 with humidifier @1L/m via nasal cannula  Continues rounding implemented   No somatic complaint overnight  No PRN needed for sleep aid   No indication of pain or discomfort   No respiratory distress   Will continue to monitor

## 2020-03-17 NOTE — PROGRESS NOTES
Progress Note - Selina Simon 1955, 60 y o  female MRN: [id number]    Unit/Bed#: [facility] 110-02 Encounter: [id number]    Primary Care Provider: Judith Morrissey PA-C   Date and time admitted to hospital: 7/23/2019  5:30 PM        * Schizoaffective disorder, bipolar type [facility])  Assessment & Plan  Still fixated on not liking the room she was shown at the [facility]  Still somatic with fear of choking, being short of breath and dying of heart attack and now worried about Corona virus as well  She is not willing to easily be redirected in accepting the [facility]  She is taking showers twice a week, and attending to Still not very welll groomed today with uncombed hair  Still suspicious about some staff tampering with her oxygen concentrator and inhalants  Compliant with medications and sleep and appetite are fine    Attending most of the groups   Current medications:    Current Facility-Administered Medications:     acetaminophen (TYLENOL) tablet 325 mg, 325 mg, Oral, Q6H PRN, Greer Beltran MD    acetaminophen (TYLENOL) tablet 650 mg, 650 mg, Oral, Q6H PRN, Greer Beltran MD    acetaminophen (TYLENOL) tablet 650 mg, 650 mg, Oral, Q8H PRN, Greer Beltran MD    albuterol (PROVENTIL HFA,VENTOLIN HFA) inhaler 2 puff, 2 puff, Inhalation, Q4H PRN, Greer Beltran MD, 2 puff at 10/11/19 0424    aluminum-magnesium hydroxide-simethicone (MYLANTA) 200-200-20 mg/5 mL oral suspension 15 mL, 15 mL, Oral, Q4H PRN, Greer Beltran MD, 15 mL at 01/26/20 1021    ammonium lactate (LAC-HYDRIN) 12 % lotion 1 application, 1 application, Topical, BID PRN, Greer Beltran MD    benzonatate (TESSALON PERLES) capsule 100 mg, 100 mg, Oral, TID PRN, Greer Beltran MD    benztropine (COGENTIN) injection 1 mg, 1 mg, Intramuscular, Q8H PRN, Greer Beltran MD    carbamide peroxide (DEBROX) 6 5 % otic solution 5 drop, 5 drop, Left Ear, BID PRN, Alexy Caro MD    cloZAPine (CLOZARIL) tablet 25 mg, 25 mg, Oral, BID, Greer Beltran MD, 25 mg at 03/16/20 2133    cloZAPine (CLOZARIL) tablet 50 mg, 50 mg, Oral, BID, Jeffrey Gallegos MD, 50 mg at 03/16/20 2133    docusate sodium (COLACE) capsule 100 mg, 100 mg, Oral, BID PRN, Jeffrey Gallegos MD    EPINEPHrine PF (ADRENALIN) 1 mg/mL injection 0 15 mg, 0 15 mg, Intramuscular, Once PRN, Jeffrey Gallegos MD    fluticasone-vilanterol (BREO ELLIPTA) 200-25 MCG/INH inhaler 1 puff, 1 puff, Inhalation, Daily, Jeffrey Gallegos MD, 1 puff at 03/17/20 0849    ketotifen (ZADITOR) 0 025 % ophthalmic solution 1 drop, 1 drop, Right Eye, BID PRN, Jeffrey Gallegos MD    levothyroxine tablet 125 mcg, 125 mcg, Oral, Early Morning, Jeffrey Gallegos MD, 125 mcg at 03/17/20 0602    magnesium hydroxide (MILK OF MAGNESIA) 400 mg/5 mL oral suspension 30 mL, 30 mL, Oral, Daily PRN, Jeffrey Gallegos MD    montelukast (SINGULAIR) tablet 10 mg, 10 mg, Oral, HS, Jeffrey Gallegos MD, 10 mg at 02/22/20 2104    OLANZapine (ZyPREXA) IM injection 5 mg, 5 mg, Intramuscular, Q8H PRN, Jeffrey Gallegos MD    OLANZapine (ZyPREXA) tablet 5 mg, 5 mg, Oral, Q8H PRN, Jeffrey Gallegos MD    ondansetron (ZOFRAN-ODT) dispersible tablet 4 mg, 4 mg, Oral, Q6H PRN, Jeffrey Gallegos MD, 4 mg at 12/09/19 1757    pantoprazole (PROTONIX) EC tablet 40 mg, 40 mg, Oral, Early Morning, Jeffrey Gallegos MD, 40 mg at 03/17/20 0601    polyethylene glycol (MIRALAX) packet 17 g, 17 g, Oral, Daily PRN, Jeffrey Gallegos MD    polyvinyl alcohol (LIQUIFILM TEARS) 1 4 % ophthalmic solution 1 drop, 1 drop, Both Eyes, Q3H PRN, Jeffrey Gallegos MD    sertraline (ZOLOFT) tablet 175 mg, 175 mg, Oral, Daily, Jeffrey Gallegos MD, 175 mg at 03/17/20 0848    sucralfate (CARAFATE) oral suspension 1,000 mg, 1,000 mg, Oral, BID, Cat Torres MD, 1,000 mg at 03/17/20 0601    theophylline (EJF-24) 24 hr capsule 200 mg, 200 mg, Oral, Daily, Jeffrey Gallegos MD, 200 mg at 03/17/20 0848    tiotropium (SPIRIVA) capsule for inhaler 18 mcg, 18 mcg, Inhalation, Daily, Jeffrey Gallegos MD, 18 mcg at 03/17/20 0849    traZODone (DESYREL) tablet 25 mg, 25 mg, Oral, HS PRN, Chichi Lockett MD  Justification if on more than two antipsychotics: not applicable  Side effects if any: none    Risks , benefits, side effects and precautions of medications discussed with patient and reminded patient to let us know any problems with medications     Objective:     Vital Signs:  Vitals:    03/16/20 1634 03/16/20 2010 03/17/20 0730 03/17/20 0732   BP: 118/72 108/55 107/57 99/54   BP Location: Left arm Left arm Left arm Left arm   Pulse: 88 73 75 71   Resp: 18 18 18 18   Temp: (!) 97 1 °F (36 2 °C) (!) 97 1 °F (36 2 °C) (!) 97 °F (36 1 °C)    TempSrc: Temporal Temporal Temporal    SpO2: 93% 93%     Weight:       Height:         Appearance:  age appropriate and older than stated age appears poorly groomed, still argumentative and demanding not to send her to the [employer] personal care home, casually dressed and hair uncombed   Behavior:  deviant, evasive and guarded a tendency to become suspicious   Speech:  delayed, increased latency of response and tangential    Mood:  anxious, euphoric and irritable    Affect:  increased in intensity, increased in range, mood-congruent and redirectable   Thought Process:  circumstantial, concrete, goal directed, illogical and perserverative with stilted speech   Thought Content:  delusions  grandiose and persecutory no current suicidal homicidal thoughts intent or plans reported  No overt delusions except for some lingering paranoia about certain staff tampering with her oxygen concentrator and her inhalant  Still psychosomatic with fear of death from talking or shortness of breath or heart attack and now worried about corona virus    No preoccupation with violence or suicide   Perceptual Disturbances: None    Risk Potential: Inability to care for herself and refusal to take showers regularly   Sensorium:  person, place, time/date, situation, day of week, month of year, year and time   Cognition:  grossly intact no language deficit, no deficit in recent or remote memory, aware of current events like the corona virus   Consciousness:  alert and awake    Attention: Good   Intellect: Estimated to be within average range   Insight:  improving   Judgment: fair       Motor Activity: no abnormal movements         Recent Labs:  Results Reviewed     None          I/O Past 24 hours:  No intake/output data recorded  No intake/output data recorded  Assessment / Plan:     Schizoaffective disorder, bipolar type ([address] )  Overall status:  improving    Reason for continued inpatient care: to assess ability to maintain improvement by attending to ADLs and taking showers regular and see how she does on outing with family after another outing with staff escort next week  Acceptance by patient: accepting now  Hopefulness in recovery: living at the [facility] soon  Understanding of medications :  yes  Involved in reintegration process: attending groups and has off grounds and off unit privileges   Trusting in relatoinship with psychiatrist:  Emeterio Caraballo now  Overall status :  No changes  Recommended Treatment:    Medication changes:  1) none today    Non-pharmacological treatments  1) Continue with individual therapy, group therapy, milieu therapy and occupational therapy using recovery principles and psycho-education about accepting illness and need for treatment   2) encourage to take showers twice a week and cooperate with treatment and adl skills as per her behav  plan  3) another therapeutic pass with sister now that she did well  with the assigned staff escort to the park but it is now on hold due to corana virus  4) Prepare for the [facility] and encouraged to accepted rather than refuse it  Safety  1) Safety/communication plan established targeting dynamic risk factors above  Discharge Plan back to a [facility] at [address] / Coordination of Care    Total floor / unit time spent today 15 minutes   Greater than 50% of total time was spent with the patient and / or family counseling and / or coordination of care  Receptive to listening and learning coping skills for management of symptoms and attending to ADLs  Patient's Rights, confidentiality and exceptions to confidentiality, use of automated medical record, Jeb Patrick staff access to medical record, and consent to treatment reviewed      Manuel Copeland MD

## 2020-03-17 NOTE — PROGRESS NOTES
03/16/20 0930   Activity/Group Checklist   Group Community meeting   Attendance Did not attend     Patient was encouraged to attend group, but declined, staying in bed instead

## 2020-03-17 NOTE — PROGRESS NOTES
03/17/20 0900   Team Meeting   Meeting Type Daily Rounds   Team Members Present   Team Members Present Physician;Nurse;; Other (Discipline and Name)   Physician Team Member Dr Birgit Castro Team Member Desmond Dawn RN   Care Management Team Member Brenda Green   Other (Discipline and Name) Jasvir Hopkins LCSW     Reporting negative thoughts about the [facility], reporting multiple different reasons why she doesn't like the [facility]  Slept

## 2020-03-17 NOTE — PLAN OF CARE
Problem: Alteration in Thoughts and Perception  Goal: Verbalize thoughts and feelings  Description  Interventions:  - Promote a nonjudgmental and trusting relationship with the patient through active listening and therapeutic communication  - Assess patient's level of functioning, behavior and potential for risk  - Engage patient in 1 on 1 interactions for a minimum of 15 minutes each session  - Encourage patient to express fears, feelings, frustrations, and discuss symptoms    - [place] patient to reality, help patient recognize reality-based thinking   - Administer medications as ordered and assess for potential side effects  - Provide the patient education related to the signs and symptoms of the illness and desired effects of prescribed medications  Outcome: Progressing     Problem: Alteration in Orientation  Goal: Interact with staff daily  Description  Interventions:  - Assess and re-assess patient's level of orientation  - Engage patient in 1 on 1 interactions, daily, for a minimum of 15 minutes   - Establish rapport/trust with patient   Outcome: Progressing     Problem: Alteration in Thoughts and Perception  Goal: Attend and participate in unit activities, including therapeutic, recreational, and educational groups  Description  Interventions:  - Provide therapeutic and educational activities daily, encourage attendance and participation, and document same in the medical record     CERTIFIED PEER SPECIALIST INTERVENTIONS:    Complete peer assessment with patient to assess their needs and identify their goals to complete while in the recovery program as well as once discharged into the community  Patient will complete WRAP Plan, Crisis Plan and 5 Life Domains  Patient will attend 50% of groups offered on the unit  Patient will complete a goal card weekly      Outcome: Not Progressing  Goal: Complete daily ADLs, including personal hygiene independently, as able  Description  Interventions:  - Observe, teach, and assist patient with ADLS  - Monitor and promote a balance of rest/activity, with adequate nutrition and elimination   Outcome: Not Progressing     Problem: Depression  Goal: Refrain from isolation  Description  Interventions:  - Develop a trusting relationship   - Encourage socialization   Outcome: Not Zainab Farfan has been isolative to her room and self most of the shift  Social with staff and select peers when out of her room  Stated that she is "so-so" Naps at times  Likes to lay in bed facing the wall  Prompted for medication  Ate 100% and drank Ensure  No shower or BM this shift  Attended evening group  Took HS medication with prompting  Ate snack  Oxygen saturation 91% prior to oxygen 1 liter via nasal cannula applied  Continue to monitor  Precautions maintained

## 2020-03-17 NOTE — PLAN OF CARE
Problem: Alteration in Thoughts and Perception  Goal: Verbalize thoughts and feelings  Description  Interventions:  - Promote a nonjudgmental and trusting relationship with the patient through active listening and therapeutic communication  - Assess patient's level of functioning, behavior and potential for risk  - Engage patient in 1 on 1 interactions for a minimum of 15 minutes each session  - Encourage patient to express fears, feelings, frustrations, and discuss symptoms    - [place] patient to reality, help patient recognize reality-based thinking   - Administer medications as ordered and assess for potential side effects  - Provide the patient education related to the signs and symptoms of the illness and desired effects of prescribed medications  Outcome: Progressing     Problem: Alteration in Thoughts and Perception  Goal: Attend and participate in unit activities, including therapeutic, recreational, and educational groups  Description  Interventions:  - Provide therapeutic and educational activities daily, encourage attendance and participation, and document same in the medical record   Outcome: Not Progressing     Problem: Ineffective Coping  Goal: Demonstrates healthy coping skills  Outcome: Not Progressing    Individual has been in room, in bed, for majority of the shift  She has struggles to participate in programming  She did not attend any groups, but did participate in treatment team meeting  She verbalized being " scared"  that she will be placed in [facility]   The team was able to have her begin processing options to make [facility] a potential discharge disposition  There is minimal interactions with peers through out the day  Appetite has been poor, 25% breakfast and 10% of lunch  Availability of staff made known, will continue to monitor

## 2020-03-17 NOTE — ASSESSMENT & PLAN NOTE
Still fixated on not liking the room she was shown at the [facility]  Still somatic with fear of choking, being short of breath and dying of heart attack and now worried about Corona virus as well  She is not willing to easily be redirected in accepting the [facility]  She is taking showers twice a week, and attending to Still not very welll groomed today with uncombed hair  Still suspicious about some staff tampering with her oxygen concentrator and inhalants  Compliant with medications and sleep and appetite are fine    Attending most of the groups   Current medications:    Current Facility-Administered Medications:     acetaminophen (TYLENOL) tablet 325 mg, 325 mg, Oral, Q6H PRN, Jaz Beasley MD    acetaminophen (TYLENOL) tablet 650 mg, 650 mg, Oral, Q6H PRN, Jaz Beasley MD    acetaminophen (TYLENOL) tablet 650 mg, 650 mg, Oral, Q8H PRN, Jaz Beasley MD    albuterol (PROVENTIL HFA,VENTOLIN HFA) inhaler 2 puff, 2 puff, Inhalation, Q4H PRN, Jaz Beasley MD, 2 puff at 10/11/19 0424    aluminum-magnesium hydroxide-simethicone (MYLANTA) 200-200-20 mg/5 mL oral suspension 15 mL, 15 mL, Oral, Q4H PRN, Jaz Beasley MD, 15 mL at 01/26/20 1021    ammonium lactate (LAC-HYDRIN) 12 % lotion 1 application, 1 application, Topical, BID PRN, Jaz Beasley MD    benzonatate (TESSALON PERLES) capsule 100 mg, 100 mg, Oral, TID PRN, Jaz Beasley MD    benztropine (COGENTIN) injection 1 mg, 1 mg, Intramuscular, Q8H PRN, Jaz Beasley MD    carbamide peroxide (DEBROX) 6 5 % otic solution 5 drop, 5 drop, Left Ear, BID PRN, Vlad Ballard MD    cloZAPine (CLOZARIL) tablet 25 mg, 25 mg, Oral, BID, Jaz Beasley MD, 25 mg at 03/16/20 2133    cloZAPine (CLOZARIL) tablet 50 mg, 50 mg, Oral, BID, Jaz Beasley MD, 50 mg at 03/16/20 2133    docusate sodium (COLACE) capsule 100 mg, 100 mg, Oral, BID PRN, Jaz Beasley MD    EPINEPHrine PF (ADRENALIN) 1 mg/mL injection 0 15 mg, 0 15 mg, Intramuscular, Once PRN, MD Marcello Blair fluticasone-vilanterol (BREO ELLIPTA) 200-25 MCG/INH inhaler 1 puff, 1 puff, Inhalation, Daily, Christian Bennett MD, 1 puff at 03/17/20 0849    ketotifen (ZADITOR) 0 025 % ophthalmic solution 1 drop, 1 drop, Right Eye, BID PRN, Christian Bennett MD    levothyroxine tablet 125 mcg, 125 mcg, Oral, Early Morning, Christian Bennett MD, 125 mcg at 03/17/20 0602    magnesium hydroxide (MILK OF MAGNESIA) 400 mg/5 mL oral suspension 30 mL, 30 mL, Oral, Daily PRN, Christian Bennett MD    montelukast (SINGULAIR) tablet 10 mg, 10 mg, Oral, HS, Christian Bennett MD, 10 mg at 02/22/20 2104    OLANZapine (ZyPREXA) IM injection 5 mg, 5 mg, Intramuscular, Q8H PRN, Christian Bennett MD    OLANZapine (ZyPREXA) tablet 5 mg, 5 mg, Oral, Q8H PRN, Christian Bennett MD    ondansetron (ZOFRAN-ODT) dispersible tablet 4 mg, 4 mg, Oral, Q6H PRN, Christian Bennett MD, 4 mg at 12/09/19 1757    pantoprazole (PROTONIX) EC tablet 40 mg, 40 mg, Oral, Early Morning, Christian Bennett MD, 40 mg at 03/17/20 0601    polyethylene glycol (MIRALAX) packet 17 g, 17 g, Oral, Daily PRN, Christian Bennett MD    polyvinyl alcohol (LIQUIFILM TEARS) 1 4 % ophthalmic solution 1 drop, 1 drop, Both Eyes, Q3H PRN, Christian Bennett MD    sertraline (ZOLOFT) tablet 175 mg, 175 mg, Oral, Daily, Christian Bennett MD, 175 mg at 03/17/20 0848    sucralfate (CARAFATE) oral suspension 1,000 mg, 1,000 mg, Oral, BID, Cat Torres MD, 1,000 mg at 03/17/20 0601    theophylline (JEF-24) 24 hr capsule 200 mg, 200 mg, Oral, Daily, Christian Bennett MD, 200 mg at 03/17/20 0848    tiotropium (SPIRIVA) capsule for inhaler 18 mcg, 18 mcg, Inhalation, Daily, Christian Bennett MD, 18 mcg at 03/17/20 0849    traZODone (DESYREL) tablet 25 mg, 25 mg, Oral, HS PRN, Christian Bennett MD  Justification if on more than two antipsychotics: not applicable  Side effects if any: none    Risks , benefits, side effects and precautions of medications discussed with patient and reminded patient to let us know any problems with medications Objective:     Vital Signs:  Vitals:    03/16/20 1634 03/16/20 2010 03/17/20 0730 03/17/20 0732   BP: 118/72 108/55 107/57 99/54   BP Location: Left arm Left arm Left arm Left arm   Pulse: 88 73 75 71   Resp: 18 18 18 18   Temp: (!) 97 1 °F (36 2 °C) (!) 97 1 °F (36 2 °C) (!) 97 °F (36 1 °C)    TempSrc: Temporal Temporal Temporal    SpO2: 93% 93%     Weight:       Height:         Appearance:  age appropriate and older than stated age appears poorly groomed, still argumentative and demanding not to send her to the [facility] personal care home, casually dressed and hair uncombed   Behavior:  deviant, evasive and guarded a tendency to become suspicious   Speech:  delayed, increased latency of response and tangential    Mood:  anxious, euphoric and irritable    Affect:  increased in intensity, increased in range, mood-congruent and redirectable   Thought Process:  circumstantial, concrete, goal directed, illogical and perserverative with stilted speech   Thought Content:  delusions  grandiose and persecutory no current suicidal homicidal thoughts intent or plans reported  No overt delusions except for some lingering paranoia about certain staff tampering with her oxygen concentrator and her inhalant  Still psychosomatic with fear of death from talking or shortness of breath or heart attack and now worried about corona virus    No preoccupation with violence or suicide   Perceptual Disturbances: None    Risk Potential: Inability to care for herself and refusal to take showers regularly   Sensorium:  person, place, time/date, situation, day of week, month of year, year and time   Cognition:  grossly intact no language deficit, no deficit in recent or remote memory, aware of current events like the corona virus   Consciousness:  alert and awake    Attention: Good   Intellect: Estimated to be within average range   Insight:  improving   Judgment: fair       Motor Activity: no abnormal movements         Recent Labs:  Results Reviewed     None          I/O Past 24 hours:  No intake/output data recorded  No intake/output data recorded  Assessment / Plan:     Schizoaffective disorder, bipolar type ([address] )  Overall status:  improving    Reason for continued inpatient care: to assess ability to maintain improvement by attending to ADLs and taking showers regular and see how she does on outing with family after another outing with staff escort next week  Acceptance by patient: accepting now  Hopefulness in recovery: living at the [facility] soon  Understanding of medications :  yes  Involved in reintegration process: attending groups and has off grounds and off unit privileges   Trusting in relatoinship with psychiatrist:  Nicola Robledo now  Overall status :  No changes  Recommended Treatment:    Medication changes:  1) none today    Non-pharmacological treatments  1) Continue with individual therapy, group therapy, milieu therapy and occupational therapy using recovery principles and psycho-education about accepting illness and need for treatment   2) encourage to take showers twice a week and cooperate with treatment and adl skills as per her behav  plan  3) another therapeutic pass with sister now that she did well  with the assigned staff escort to the park but it is now on hold due to corana virus  4) Prepare for the [facility] and encouraged to accepted rather than refuse it  Safety  1) Safety/communication plan established targeting dynamic risk factors above  Discharge Plan back to a [facility] at [address] / Coordination of Care    Total floor / unit time spent today 15 minutes  Greater than 50% of total time was spent with the patient and / or family counseling and / or coordination of care  Receptive to listening and learning coping skills for management of symptoms and attending to ADLs       Patient's Rights, confidentiality and exceptions to confidentiality, use of automated medical record, [facility] staff access to medical record, and consent to treatment reviewed      Allie Guzman MD

## 2020-03-18 NOTE — PLAN OF CARE
Problem: Alteration in Thoughts and Perception  Goal: Treatment Goal: Gain control of psychotic behaviors/thinking, reduce/eliminate presenting symptoms and demonstrate improved reality functioning upon discharge  Outcome: Progressing  Goal: Verbalize thoughts and feelings  Description  Interventions:  - Promote a nonjudgmental and trusting relationship with the patient through active listening and therapeutic communication  - Assess patient's level of functioning, behavior and potential for risk  - Engage patient in 1 on 1 interactions for a minimum of 15 minutes each session  - Encourage patient to express fears, feelings, frustrations, and discuss symptoms    - [place] patient to reality, help patient recognize reality-based thinking   - Administer medications as ordered and assess for potential side effects  - Provide the patient education related to the signs and symptoms of the illness and desired effects of prescribed medications  Outcome: Progressing  Goal: Agree to be compliant with medication regime, as prescribed and report medication side effects  Description  Interventions:  - Offer appropriate PRN medication and supervise ingestion; conduct aims, as needed   Outcome: Progressing  Goal: Attend and participate in unit activities, including therapeutic, recreational, and educational groups  Description  Interventions:  - Provide therapeutic and educational activities daily, encourage attendance and participation, and document same in the medical record     CERTIFIED PEER SPECIALIST INTERVENTIONS:    Complete peer assessment with patient to assess their needs and identify their goals to complete while in the recovery program as well as once discharged into the community  Patient will complete WRAP Plan, Crisis Plan and [facility]  Patient will attend 50% of groups offered on the unit  Patient will complete a goal card weekly      Outcome: Progressing  Goal: Recognize dysfunctional thoughts, communicate reality-based thoughts at the time of discharge  Description  Interventions:  - Provide medication and psycho-education to assist patient in compliance and developing insight into his/her illness   Outcome: Progressing     Problem: Ineffective Coping  Goal: Participates in unit activities  Description  Interventions:  - Provide therapeutic environment   - Provide required programming   - Redirect inappropriate behaviors   Outcome: Progressing  Goal: Patient/Family verbalizes awareness of resources  Outcome: Progressing  Goal: Understands least restrictive measures  Description  Interventions:  - Utilize least restrictive behavior  Outcome: Progressing     Problem: Risk for Self Injury/Neglect  Goal: Treatment Goal: Remain safe during length of stay, learn and adopt new coping skills, and be free of self-injurious ideation, impulses and acts at the time of discharge  Outcome: Progressing  Goal: Verbalize thoughts and feelings  Description  Interventions:  - Assess and re-assess patient's lethality and potential for self-injury  - Engage patient in 1:1 interactions, daily, for a minimum of 15 minutes  - Encourage patient to express feelings, fears, frustrations, hopes  - Establish rapport/trust with patient   Outcome: Progressing  Goal: Refrain from harming self  Description  Interventions:  - Monitor patient closely, per order  - Develop a trusting relationship  - Supervise medication ingestion, monitor effects and side effects   Outcome: Progressing  Goal: Attend and participate in unit activities, including therapeutic, recreational, and educational groups  Description  Interventions:  - Provide therapeutic and educational activities daily, encourage attendance and participation, and document same in the medical record  - Obtain collateral information, encourage visitation and family involvement in care   Outcome: Progressing  Goal: Recognize maladaptive responses and adopt new coping mechanisms  Outcome: Progressing     Problem: Depression  Goal: Verbalize thoughts and feelings  Description  Interventions:  - Assess and re-assess patient's level of risk   - Engage patient in 1:1 interactions, daily, for a minimum of 15 minutes   - Encourage patient to express feelings, fears, frustrations, hopes   Outcome: Progressing     Problem: Anxiety  Goal: Anxiety is at manageable level  Description  Interventions:  - Assess and monitor patient's anxiety level  - Monitor for signs and symptoms of anxiety both physical and emotional (heart palpitations, chest pain, shortness of breath, headaches, nausea, feeling jumpy, restlessness, irritable, apprehensive)  - Collaborate with interdisciplinary team and initiate plan and interventions as ordered    - [place] patient to unit/surroundings  - Explain treatment plan  - Encourage participation in care  - Encourage verbalization of concerns/fears  - Identify coping mechanisms  - Assist in developing anxiety-reducing skills  - Administer/offer alternative therapies  - Limit or eliminate stimulants  Outcome: Progressing     Problem: Alteration in Orientation  Goal: Interact with staff daily  Description  Interventions:  - Assess and re-assess patient's level of orientation  - Engage patient in 1 on 1 interactions, daily, for a minimum of 15 minutes   - Establish rapport/trust with patient   Outcome: Progressing     Problem: PAIN - ADULT  Goal: Verbalizes/displays adequate comfort level or baseline comfort level  Description  Interventions:  - Encourage patient to monitor pain and request assistance  - Assess pain using appropriate pain scale  - Administer analgesics based on type and severity of pain and evaluate response  - Implement non-pharmacological measures as appropriate and evaluate response  - Consider cultural and social influences on pain and pain management  - Notify physician/advanced practitioner if interventions unsuccessful or patient reports new pain  Outcome: Progressing     Problem: SAFETY ADULT  Goal: Patient will remain free of falls  Description  INTERVENTIONS:  - Assess patient frequently for physical needs  -  Identify cognitive and physical deficits and behaviors that affect risk of falls  -  [place] fall precautions as indicated by assessment   - Educate patient/family on patient safety including physical limitations  - Instruct patient to call for assistance with activity based on assessment  - Modify environment to reduce risk of injury  - Consider OT/PT consult to assist with strengthening/mobility  Outcome: Progressing     Problem: Alteration in Thoughts and Perception  Goal: Complete daily ADLs, including personal hygiene independently, as able  Description  Interventions:  - Observe, teach, and assist patient with ADLS  - Monitor and promote a balance of rest/activity, with adequate nutrition and elimination   Outcome: Not Progressing     Problem: Risk for Self Injury/Neglect  Goal: Complete daily ADLs, including personal hygiene independently, as able  Description  Interventions:  - Observe, teach, and assist patient with ADLS  - Monitor and promote a balance of rest/activity, with adequate nutrition and elimination  Outcome: Not Progressing     Problem: Depression  Goal: Treatment Goal: Demonstrate behavioral control of depressive symptoms, verbalize feelings of improved mood/affect, and adopt new coping skills prior to discharge  Outcome: Not Progressing  Goal: Refrain from isolation  Description  Interventions:  - Develop a trusting relationship   - Encourage socialization   Outcome: Not Progressing  Goal: Refrain from self-neglect  Outcome: Not Progressing     Problem: Nutrition/Hydration-ADULT  Goal: Nutrient/Hydration intake appropriate for improving, restoring or maintaining nutritional needs  Description  Monitor and assess patient's nutrition/hydration status for malnutrition   Collaborate with interdisciplinary team and initiate plan and interventions as ordered  Monitor patient's weight and dietary intake as ordered or per policy  Utilize nutrition screening tool and intervene as necessary  Determine patient's food preferences and provide high-protein, high-caloric foods as appropriate  INTERVENTIONS:  - Monitor oral intake, urinary output, labs, and treatment plans  - Assess nutrition and hydration status and recommend course of action  - Evaluate amount of meals eaten  - Assist patient with eating if necessary   - Allow adequate time for meals  - Recommend/ encourage appropriate diets, oral nutritional supplements, and vitamin/mineral supplements  - Order, calculate, and assess calorie counts as needed  - Recommend, monitor, and adjust tube feedings and TPN/PPN based on assessed needs  - Assess need for intravenous fluids  - Provide specific nutrition/hydration education as appropriate  - Include patient/family/caregiver in decisions related to nutrition  Outcome: Not Progressing   Mostly isolative to her room but came out for meds and meals, and attended IMR group  Ate 50% of breakfast and 25% of lunch  Offered no complaints, no other behaviors or issues noted  Continue to monitor

## 2020-03-18 NOTE — PROGRESS NOTES
Maggie maintained on ongoing fall and SAFE precaution  Brady Bansal in bed with eyes closed, breath even and unlabored   On O2 with humidifier @1L/m via nasal cannula  Continues rounding implemented   No somatic complaint overnight  No PRN needed for sleep aid   No indication of pain or discomfort   No respiratory distress   Will continue to monitor

## 2020-03-18 NOTE — PROGRESS NOTES
03/18/20 0900   Team Meeting   Meeting Type Daily Rounds   Team Members Present   Team Members Present Physician;Nurse;; Other (Discipline and Name)   Physician Team Member Dr Romie Drake Team Member Alison Regalado RN   Care Management Team Member Brenda Fowler   Other (Discipline and Name) Alexis Winslow, LCSW; Angie Bar, Ph D , LCSW     Patient presents with poor appetite yesterday  No groups  Slept

## 2020-03-18 NOTE — ASSESSMENT & PLAN NOTE
Patient continues to talk negatively about the [facility] finding several reasons as not to go other as he believes the room is or small and has 2 large windows that she is afraid to fall off  She is however willing to have the  did talk to them if they could have cabinets sitting against those when does and see if that would be acceptable for them  She is still reluctant to consider going there and is demanding to get her old room back which may not be likely  He he was refusing to go to groups and was not eating all the meals yesterday  Still psychosomatic about catching corona virus or dying from choking on food or from being short of breath of from heart attack which are all ongoing beliefs  Compliant with medications  No side effects reported  Still not taking showers all the time even though she is supposed to take showers twice a week and she claims she has  She continues to talk in a stilted manner as if in a court of law which has not changed  She is still suspicious of certain staff tampering with her oxygen concentrator as well as her inhalant  She is sleeping well and reports no other issues  She is basically isolated staying on her bed but does get up when approached in her room     Current medications:    Current Facility-Administered Medications:     acetaminophen (TYLENOL) tablet 325 mg, 325 mg, Oral, Q6H PRN, Vincent Olivares MD    acetaminophen (TYLENOL) tablet 650 mg, 650 mg, Oral, Q6H PRN, Vincent Olivares MD    acetaminophen (TYLENOL) tablet 650 mg, 650 mg, Oral, Q8H PRN, Vincent Olivares MD    albuterol (PROVENTIL HFA,VENTOLIN HFA) inhaler 2 puff, 2 puff, Inhalation, Q4H PRN, Vincent Olivares MD, 2 puff at 10/11/19 0424    aluminum-magnesium hydroxide-simethicone (MYLANTA) 200-200-20 mg/5 mL oral suspension 15 mL, 15 mL, Oral, Q4H PRN, Vincent Olivares MD, 15 mL at 01/26/20 1021    ammonium lactate (LAC-HYDRIN) 12 % lotion 1 application, 1 application, Topical, BID PRN, Krystyna Mcclain MD    benzonatate (TESSALON PERLES) capsule 100 mg, 100 mg, Oral, TID PRN, Krystyna Mcclain MD    benztropine (COGENTIN) injection 1 mg, 1 mg, Intramuscular, Q8H PRN, Krystyna Mcclain MD    carbamide peroxide (DEBROX) 6 5 % otic solution 5 drop, 5 drop, Left Ear, BID PRN, Amy Powell MD    cloZAPine (CLOZARIL) tablet 25 mg, 25 mg, Oral, BID, Krystyna Mcclain MD, 25 mg at 03/17/20 2054    cloZAPine (CLOZARIL) tablet 50 mg, 50 mg, Oral, BID, Krystyna Mcclain MD, 50 mg at 03/17/20 2054    docusate sodium (COLACE) capsule 100 mg, 100 mg, Oral, BID PRN, Krystyna Mcclain MD    EPINEPHrine PF (ADRENALIN) 1 mg/mL injection 0 15 mg, 0 15 mg, Intramuscular, Once PRN, Krystyna Mcclain MD    fluticasone-vilanterol (BREO ELLIPTA) 200-25 MCG/INH inhaler 1 puff, 1 puff, Inhalation, Daily, Krystyna Mcclain MD, 1 puff at 03/17/20 0849    ketotifen (ZADITOR) 0 025 % ophthalmic solution 1 drop, 1 drop, Right Eye, BID PRN, Krystyna Mcclain MD    levothyroxine tablet 125 mcg, 125 mcg, Oral, Early Morning, Krystyna Mcclain MD, 125 mcg at 03/18/20 0607    magnesium hydroxide (MILK OF MAGNESIA) 400 mg/5 mL oral suspension 30 mL, 30 mL, Oral, Daily PRN, Krystyna Mcclain MD    montelukast (SINGULAIR) tablet 10 mg, 10 mg, Oral, HS, Krystyna Mcclain MD, 10 mg at 02/22/20 2104    OLANZapine (ZyPREXA) IM injection 5 mg, 5 mg, Intramuscular, Q8H PRN, Krystyna Mcclain MD    OLANZapine (ZyPREXA) tablet 5 mg, 5 mg, Oral, Q8H PRN, Krystyna Mcclain MD    ondansetron (ZOFRAN-ODT) dispersible tablet 4 mg, 4 mg, Oral, Q6H PRN, Krystyna Mcclain MD, 4 mg at 12/09/19 1757    pantoprazole (PROTONIX) EC tablet 40 mg, 40 mg, Oral, Early Morning, Krystyna Mcclain MD, 40 mg at 03/18/20 0607    polyethylene glycol (MIRALAX) packet 17 g, 17 g, Oral, Daily PRN, Krystyna Mcclain MD    polyvinyl alcohol (LIQUIFILM TEARS) 1 4 % ophthalmic solution 1 drop, 1 drop, Both Eyes, Q3H PRN, Krystyna Mcclain MD    sertraline (ZOLOFT) tablet 175 mg, 175 mg, Oral, Daily, Krystyna Mcclain MD, 175 mg at 03/17/20 0848    sucralfate (CARAFATE) oral suspension 1,000 mg, 1,000 mg, Oral, BID, Cat Torres MD, 1,000 mg at 03/18/20 0607    theophylline (JEF-24) 24 hr capsule 200 mg, 200 mg, Oral, Daily, Jeffrey Gallegos MD, 200 mg at 03/17/20 0848    tiotropium (SPIRIVA) capsule for inhaler 18 mcg, 18 mcg, Inhalation, Daily, Jeffrey Gallegos MD, 18 mcg at 03/17/20 0849    traZODone (DESYREL) tablet 25 mg, 25 mg, Oral, HS PRN, Jeffrey Gallegos MD  Justification if on more than two antipsychotics: not applicable  Side effects if any: none    Risks , benefits, side effects and precautions of medications discussed with patient and reminded patient to let us know any problems with medications     Objective:     Vital Signs:  Vitals:    03/17/20 1640 03/17/20 2000 03/17/20 2100 03/18/20 0739   BP: 115/68 90/50  100/64   BP Location: Right arm Left arm  Right arm   Pulse: 76 65  82   Resp: 16   18   Temp: (!) 97 °F (36 1 °C)   (!) 97 1 °F (36 2 °C)   TempSrc: Temporal   Temporal   SpO2: 93%  91% 93%   Weight:       Height:         Appearance:  age appropriate and older than stated age appears poorly groomed, still argumentative and demanding at times as she does not like going to the [facility] unless her demands are not met with right away  Behavior:  deviant, evasive and guarded a tendency to become suspicious   Speech:  delayed, increased latency of response and tangential    Mood:  anxious, euphoric and irritable    Affect:  increased in intensity, increased in range, mood-congruent and redirectable   Thought Process:  circumstantial, concrete, goal directed, illogical and perserverative with stilted speech   Thought Content:  delusions  grandiose and persecutory no current suicidal homicidal thoughts intent or plans  No overt delusions elicited except for some paranoia about staff tampering with her inhalant and oxygen concentrator and off and on    Still psychosomatic about fear of dying from choking from corona virus or from heart attack or from shortness of breath  No phobias obsessions or compulsions  No preoccupation with violence  Perceptual Disturbances: None    Risk Potential: Inability to care for herself and refusal to take showers regularly   Sensorium:  person, place, time/date, situation, day of week, month of year, year and time   Cognition:  grossly intact none deficit in language or recent or remote memory, aware of current events   Consciousness:  alert and awake    Attention: Good   Intellect: Estimated to be average   Insight:  Improving   Judgment: fair       Motor Activity: no abnormal movements         Recent Labs:  Results Reviewed     None          I/O Past 24 hours:  No intake/output data recorded  No intake/output data recorded  Assessment / Plan:     Schizoaffective disorder, bipolar type ([facility] )  Overall status:  improving    Reason for continued inpatient care: to assess ability to maintain improvement by attending to ADLs and taking showers regular and see how she does on outing with family after another outing with staff escort next week  Acceptance by patient: accepting now  Hopefulness in recovery: living at the [facility]  Understanding of medications :  yes  Involved in reintegration process: attending groups and has off grounds and off unit privileges   Trusting in relatoinship with psychiatrist:  Freda Cramer now  Overall status :  No changes  Recommended Treatment:    Medication changes:  1) none today    Non-pharmacological treatments  1) Continue with individual therapy, group therapy, milieu therapy and occupational therapy using recovery principles and psycho-education about accepting illness and need for treatment     2) encourage to take showers twice a week and cooperate with treatment and adl skills as per her behav  plan  3) another therapeutic pass with sister now that she did well  with the assigned staff escort to the park but it is now on hold due to corana virus  4) Prepare for the [address] [place] [address] Rd and encouraged to accepted rather than refuse it  Safety  1) Safety/communication plan established targeting dynamic risk factors above  Discharge Plan back to a [facility] at [address] / Coordination of Care    Total floor / unit time spent today 15 minutes  Greater than 50% of total time was spent with the patient and / or family counseling and / or coordination of care  Receptive to listening and learning coping skills for management of symptoms and attending to ADLs  Patient's Rights, confidentiality and exceptions to confidentiality, use of automated medical record, [facility]eline staff access to medical record, and consent to treatment reviewed      Sandhya Bolaños MD

## 2020-03-18 NOTE — PROGRESS NOTES
03/17/20 1100   Activity/Group Checklist   Group Other (Comment)  ([facility] - Moral Reasoning)   Attendance Did not attend     Patient was encouraged to attend, but declined

## 2020-03-18 NOTE — PROGRESS NOTES
Progress Note - Rommel Wilkins 1955, 60 y o  female MRN: [id number]    [facility]-02 Encounter: [id number]    Primary Care Provider: Jayelne England PA-C   Date and time admitted to hospital: 7/23/2019  5:30 PM        * Schizoaffective disorder, bipolar type [facility])  Assessment & Plan  Patient continues to talk negatively about the [facility] finding several reasons as not to go other as he believes the room is or small and has 2 large windows that she is afraid to fall off  She is however willing to have the  did talk to them if they could have cabinets sitting against those when does and see if that would be acceptable for them  She is still reluctant to consider going there and is demanding to get her old room back which may not be likely  He he was refusing to go to groups and was not eating all the meals yesterday  Still psychosomatic about catching corona virus or dying from choking on food or from being short of breath of from heart attack which are all ongoing beliefs  Compliant with medications  No side effects reported  Still not taking showers all the time even though she is supposed to take showers twice a week and she claims she has  She continues to talk in a stilted manner as if in a court of law which has not changed  She is still suspicious of certain staff tampering with her oxygen concentrator as well as her inhalant  She is sleeping well and reports no other issues  She is basically isolated staying on her bed but does get up when approached in her room     Current medications:    Current Facility-Administered Medications:     acetaminophen (TYLENOL) tablet 325 mg, 325 mg, Oral, Q6H PRN, Sindy Day MD    acetaminophen (TYLENOL) tablet 650 mg, 650 mg, Oral, Q6H PRN, Sindy Day MD    acetaminophen (TYLENOL) tablet 650 mg, 650 mg, Oral, Q8H PRN, Sindy Day MD    albuterol (PROVENTIL HFA,VENTOLIN HFA) inhaler 2 puff, 2 puff, Inhalation, Q4H PRN, Meredith Wesley MD, 2 puff at 10/11/19 0424    aluminum-magnesium hydroxide-simethicone (MYLANTA) 200-200-20 mg/5 mL oral suspension 15 mL, 15 mL, Oral, Q4H PRN, Meredith Wesley MD, 15 mL at 01/26/20 1021    ammonium lactate (LAC-HYDRIN) 12 % lotion 1 application, 1 application, Topical, BID PRN, Meredith Wesley MD    benzonatate (TESSALON PERLES) capsule 100 mg, 100 mg, Oral, TID PRN, Meredith Wesley MD    benztropine (COGENTIN) injection 1 mg, 1 mg, Intramuscular, Q8H PRN, Meredith Wesley MD    carbamide peroxide (DEBROX) 6 5 % otic solution 5 drop, 5 drop, Left Ear, BID PRN, Nataly Sanchez MD    cloZAPine (CLOZARIL) tablet 25 mg, 25 mg, Oral, BID, Meredith Wesley MD, 25 mg at 03/17/20 2054    cloZAPine (CLOZARIL) tablet 50 mg, 50 mg, Oral, BID, Meredith Wesley MD, 50 mg at 03/17/20 2054    docusate sodium (COLACE) capsule 100 mg, 100 mg, Oral, BID PRN, Meredith Wesley MD    EPINEPHrine PF (ADRENALIN) 1 mg/mL injection 0 15 mg, 0 15 mg, Intramuscular, Once PRN, Meredith Wesley MD    fluticasone-vilanterol (BREO ELLIPTA) 200-25 MCG/INH inhaler 1 puff, 1 puff, Inhalation, Daily, Meredith Wesley MD, 1 puff at 03/17/20 0849    ketotifen (ZADITOR) 0 025 % ophthalmic solution 1 drop, 1 drop, Right Eye, BID PRN, Meredith Wesley MD    levothyroxine tablet 125 mcg, 125 mcg, Oral, Early Morning, Meredith Wesley MD, 125 mcg at 03/18/20 0607    magnesium hydroxide (MILK OF MAGNESIA) 400 mg/5 mL oral suspension 30 mL, 30 mL, Oral, Daily PRN, Meredith Wesley MD    montelukast (SINGULAIR) tablet 10 mg, 10 mg, Oral, HS, Meredith Wesley MD, 10 mg at 02/22/20 2104    OLANZapine (ZyPREXA) IM injection 5 mg, 5 mg, Intramuscular, Q8H PRN, Meredith Wesley MD    OLANZapine (ZyPREXA) tablet 5 mg, 5 mg, Oral, Q8H PRN, Meredith Wesley MD    ondansetron (ZOFRAN-ODT) dispersible tablet 4 mg, 4 mg, Oral, Q6H PRN, Meredith Wesley MD, 4 mg at 12/09/19 3882    pantoprazole (PROTONIX) EC tablet 40 mg, 40 mg, Oral, Early Morning, Meredith Wesley MD, 40 mg at 03/18/20 1669   polyethylene glycol (MIRALAX) packet 17 g, 17 g, Oral, Daily PRN, Shi Pham MD    polyvinyl alcohol (LIQUIFILM TEARS) 1 4 % ophthalmic solution 1 drop, 1 drop, Both Eyes, Q3H PRN, Shi Pham MD    sertraline (ZOLOFT) tablet 175 mg, 175 mg, Oral, Daily, Shi Pham MD, 175 mg at 03/17/20 0848    sucralfate (CARAFATE) oral suspension 1,000 mg, 1,000 mg, Oral, BID, Janece Hashimoto, MD, 1,000 mg at 03/18/20 0607    theophylline (JEF-24) 24 hr capsule 200 mg, 200 mg, Oral, Daily, Shi Pham MD, 200 mg at 03/17/20 0848    tiotropium (SPIRIVA) capsule for inhaler 18 mcg, 18 mcg, Inhalation, Daily, Shi Pham MD, 18 mcg at 03/17/20 0849    traZODone (DESYREL) tablet 25 mg, 25 mg, Oral, HS PRN, Shi Pham MD  Justification if on more than two antipsychotics: not applicable  Side effects if any: none    Risks , benefits, side effects and precautions of medications discussed with patient and reminded patient to let us know any problems with medications     Objective:     Vital Signs:  Vitals:    03/17/20 1640 03/17/20 2000 03/17/20 2100 03/18/20 0739   BP: 115/68 90/50  100/64   BP Location: Right arm Left arm  Right arm   Pulse: 76 65  82   Resp: 16   18   Temp: (!) 97 °F (36 1 °C)   (!) 97 1 °F (36 2 °C)   TempSrc: Temporal   Temporal   SpO2: 93%  91% 93%   Weight:       Height:         Appearance:  age appropriate and older than stated age appears poorly groomed, still argumentative and demanding at times as she does not like going to the [facility] personal care home unless her demands are not met with right away     Behavior:  deviant, evasive and guarded a tendency to become suspicious   Speech:  delayed, increased latency of response and tangential    Mood:  anxious, euphoric and irritable    Affect:  increased in intensity, increased in range, mood-congruent and redirectable   Thought Process:  circumstantial, concrete, goal directed, illogical and perserverative with stilted speech   Thought Content: delusions  grandiose and persecutory no current suicidal homicidal thoughts intent or plans  No overt delusions elicited except for some paranoia about staff tampering with her inhalant and oxygen concentrator and off and on  Still psychosomatic about fear of dying from choking from corona virus or from heart attack or from shortness of breath  No phobias obsessions or compulsions  No preoccupation with violence  Perceptual Disturbances: None    Risk Potential: Inability to care for herself and refusal to take showers regularly   Sensorium:  person, place, time/date, situation, day of week, month of year, year and time   Cognition:  grossly intact none deficit in language or recent or remote memory, aware of current events   Consciousness:  alert and awake    Attention: Good   Intellect: Estimated to be average   Insight:  Improving   Judgment: fair       Motor Activity: no abnormal movements         Recent Labs:  Results Reviewed     None          I/O Past 24 hours:  No intake/output data recorded  No intake/output data recorded          Assessment / Plan:     Schizoaffective disorder, bipolar type ([facility] )  Overall status:  improving    Reason for continued inpatient care: to assess ability to maintain improvement by attending to ADLs and taking showers regular and see how she does on outing with family after another outing with staff escort next week  Acceptance by patient: accepting now  Hopefulness in recovery: living at the [facility] soon  Understanding of medications :  yes  Involved in reintegration process: attending groups and has off grounds and off unit privileges   Trusting in relatoinship with psychiatrist:  Jud Cain now  Overall status :  No changes  Recommended Treatment:    Medication changes:  1) none today    Non-pharmacological treatments  1) Continue with individual therapy, group therapy, milieu therapy and occupational therapy using recovery principles and psycho-education about accepting illness and need for treatment   2) encourage to take showers twice a week and cooperate with treatment and adl skills as per her behav  plan  3) another therapeutic pass with sister now that she did well  with the assigned staff escort to the park but it is now on hold due to corana virus  4) Prepare for the [address] [place] [address] Rd and encouraged to accepted rather than refuse it  Safety  1) Safety/communication plan established targeting dynamic risk factors above  Discharge Plan back to a [facility] at [address] / Coordination of Care    Total floor / unit time spent today 15 minutes  Greater than 50% of total time was spent with the patient and / or family counseling and / or coordination of care  Receptive to listening and learning coping skills for management of symptoms and attending to ADLs  Patient's Rights, confidentiality and exceptions to confidentiality, use of automated medical record, [facility] staff access to medical record, and consent to treatment reviewed      Krystyna Mcclain MD

## 2020-03-19 NOTE — PLAN OF CARE
Problem: Alteration in Thoughts and Perception  Goal: Verbalize thoughts and feelings  Description  Interventions:  - Promote a nonjudgmental and trusting relationship with the patient through active listening and therapeutic communication  - Assess patient's level of functioning, behavior and potential for risk  - Engage patient in 1 on 1 interactions for a minimum of 15 minutes each session  - Encourage patient to express fears, feelings, frustrations, and discuss symptoms    - [place] patient to reality, help patient recognize reality-based thinking   - Administer medications as ordered and assess for potential side effects  - Provide the patient education related to the signs and symptoms of the illness and desired effects of prescribed medications  Outcome: Progressing  Goal: Agree to be compliant with medication regime, as prescribed and report medication side effects  Description  Interventions:  - Offer appropriate PRN medication and supervise ingestion; conduct aims, as needed   Outcome: Progressing  Goal: Complete daily ADLs, including personal hygiene independently, as able  Description  Interventions:  - Observe, teach, and assist patient with ADLS  - Monitor and promote a balance of rest/activity, with adequate nutrition and elimination   Outcome: Progressing     Problem: Depression  Goal: Refrain from isolation  Description  Interventions:  - Develop a trusting relationship   - Encourage socialization   Outcome: Not Santiago Cates was motivated to do her hygiene  Was set up by [facility] & given assistance w/hair care; did the rest herself  Ate 50% of meal; all mashed potato, bites egg salad, bites dessert, drank an Ensure  Came up for supper medicine  Reflecting more on COVID-19, it's potential impact on people's lives, speculating on duration  Given empathy  Since conclusion of meal, isolating in room in bed

## 2020-03-19 NOTE — ASSESSMENT & PLAN NOTE
Patient is now suspicious about the staff giving her the Synthroid as well as the medication for her stomach in the morning as she thought that they had tampered with the medications as well  She is still suspicious about some of the staff tampering with her oxygen concentrator inhalant as well  She is also anxious about patric the corona virus  She states that she was told that the [facility] will be accommodating her and now it appears that she may be willing to give it a try  Still preoccupied poorly groomed has not taken a shower since Sunday night and states she will take 1 tonight  She is preoccupied paranoid suspicious still psychosomatic coulee preoccupied with fear of death from choking or from heart attack off from shortness of breath  Eats poorly but sleeps well  She is 5 ft 4 and weighs about 145 lb within her ideal body weight  Review of systems unremarkable today     Current medications:    Current Facility-Administered Medications:     acetaminophen (TYLENOL) tablet 325 mg, 325 mg, Oral, Q6H PRN, Zac Sierra MD    acetaminophen (TYLENOL) tablet 650 mg, 650 mg, Oral, Q6H PRN, Zac Sierra MD    acetaminophen (TYLENOL) tablet 650 mg, 650 mg, Oral, Q8H PRN, Zac Sierra MD    albuterol (PROVENTIL HFA,VENTOLIN HFA) inhaler 2 puff, 2 puff, Inhalation, Q4H PRN, Zac Sierra MD, 2 puff at 10/11/19 0424    aluminum-magnesium hydroxide-simethicone (MYLANTA) 200-200-20 mg/5 mL oral suspension 15 mL, 15 mL, Oral, Q4H PRN, Zac Sierra MD, 15 mL at 01/26/20 1021    ammonium lactate (LAC-HYDRIN) 12 % lotion 1 application, 1 application, Topical, BID PRN, Zac Sierra MD    benzonatate (TESSALON PERLES) capsule 100 mg, 100 mg, Oral, TID PRN, Zac Sierra MD    benztropine (COGENTIN) injection 1 mg, 1 mg, Intramuscular, Q8H PRN, Zac Sierra MD    carbamide peroxide (DEBROX) 6 5 % otic solution 5 drop, 5 drop, Left Ear, BID PRN, Santiago Sanchez MD    cloZAPine (CLOZARIL) tablet 25 mg, 25 mg, Oral, BID, Allie Guzman MD, 25 mg at 03/18/20 2125    cloZAPine (CLOZARIL) tablet 50 mg, 50 mg, Oral, BID, Allie Guzman MD, 50 mg at 03/18/20 2125    docusate sodium (COLACE) capsule 100 mg, 100 mg, Oral, BID PRN, Allie Guzman MD    EPINEPHrine PF (ADRENALIN) 1 mg/mL injection 0 15 mg, 0 15 mg, Intramuscular, Once PRN, Allie Guzman MD    fluticasone-vilanterol (BREO ELLIPTA) 200-25 MCG/INH inhaler 1 puff, 1 puff, Inhalation, Daily, Allie Guzman MD, 1 puff at 03/19/20 0912    ketotifen (ZADITOR) 0 025 % ophthalmic solution 1 drop, 1 drop, Right Eye, BID PRN, Allie Guzman MD    levothyroxine tablet 125 mcg, 125 mcg, Oral, Early Morning, Allie Guzman MD, 125 mcg at 03/18/20 0607    magnesium hydroxide (MILK OF MAGNESIA) 400 mg/5 mL oral suspension 30 mL, 30 mL, Oral, Daily PRN, Allie Guzman MD    montelukast (SINGULAIR) tablet 10 mg, 10 mg, Oral, HS, Allie Guzman MD, 10 mg at 02/22/20 2104    OLANZapine (ZyPREXA) IM injection 5 mg, 5 mg, Intramuscular, Q8H PRN, Allie Guzman MD    OLANZapine (ZyPREXA) tablet 5 mg, 5 mg, Oral, Q8H PRN, Allie Guzman MD    ondansetron (ZOFRAN-ODT) dispersible tablet 4 mg, 4 mg, Oral, Q6H PRN, Allie Guzman MD, 4 mg at 12/09/19 1757    pantoprazole (PROTONIX) EC tablet 40 mg, 40 mg, Oral, Early Morning, Allie Guzman MD, 40 mg at 03/18/20 0607    polyethylene glycol (MIRALAX) packet 17 g, 17 g, Oral, Daily PRN, Allie Guzman MD    polyvinyl alcohol (LIQUIFILM TEARS) 1 4 % ophthalmic solution 1 drop, 1 drop, Both Eyes, Q3H PRN, Allie Guzman MD    sertraline (ZOLOFT) tablet 175 mg, 175 mg, Oral, Daily, Allie Guzman MD, 175 mg at 03/19/20 0499    sucralfate (CARAFATE) oral suspension 1,000 mg, 1,000 mg, Oral, BID, Cat Torres MD, 1,000 mg at 03/19/20 0609    theophylline (JEF-24) 24 hr capsule 200 mg, 200 mg, Oral, Daily, Allie Guzman MD, 200 mg at 03/19/20 1043    tiotropium (SPIRIVA) capsule for inhaler 18 mcg, 18 mcg, Inhalation, Daily, Allie Guzman MD, 74 mcg at 03/19/20 0912    traZODone (DESYREL) tablet 25 mg, 25 mg, Oral, HS PRN, Meredith Wesley MD  Justification if on more than two antipsychotics: not applicable  Side effects if any: none    Risks , benefits, side effects and precautions of medications discussed with patient and reminded patient to let us know any problems with medications     Objective:     Vital Signs:  Vitals:    03/18/20 1610 03/18/20 1951 03/19/20 0700 03/19/20 0705   BP: 108/67 92/60 96/51 132/63   BP Location: Right arm Right arm Left arm Left arm   Pulse: 83 72 67 80   Resp: 16 16 18 18   Temp: (!) 97 2 °F (36 2 °C) 97 6 °F (36 4 °C) 97 9 °F (36 6 °C)    TempSrc: Temporal Temporal Temporal    SpO2: 95% 93% 94%    Weight:       Height:         Appearance:  age appropriate and older than stated age poorly groomed still argumentative demanding and not really wanting to go to the [place] personal care home and disheveled with hair uncombed wearing same clothing from yesterday   Behavior:  deviant, evasive and guarded suspicious as to the motives of staff   Speech:  delayed, increased latency of response and tangential    Mood:  anxious, euphoric and irritable    Affect:  increased in intensity, increased in range, mood-congruent and redirectable   Thought Process:  circumstantial, concrete, goal directed, illogical and perserverative with stilted speech   Thought Content:  delusions  grandiose and persecutory no current suicidal homicidal thoughts and under plans reported  No overt delusional experiences elicited other than some paranoia and suspiciousness as noted above about intentions of staff tampering with her medications and settings on her oxygen concentrator and her inhalant  Still psychosomatic with fear of death from choking on food or shortness of breath or heart attack  No compulsions noted  No preoccupation with violence or suicide       Perceptual Disturbances: None    Risk Potential: Inability to care for herself and refusal to take showers regularly   Sensorium:  person, place, time/date, situation, day of week, month of year, year and time   Cognition:  grossly intact not deficit in language or recent or remote memory, aware of current events   Consciousness:  alert and awake    Attention: Good   Intellect: At least average   Insight:  Limited   Judgment: fair       Motor Activity: no abnormal movements         Recent Labs:  Results Reviewed     None          I/O Past 24 hours:  No intake/output data recorded  No intake/output data recorded  Assessment / Plan:     Schizoaffective disorder, bipolar type ([facility] [address] )  Overall status:  improving    Reason for continued inpatient care: to assess ability to maintain improvement by attending to ADLs and taking showers regular and see how she does on outing with family after another outing with staff escort next week  Acceptance by patient: accepting now  Hopefulness in recovery: living at the [facility] soon  Understanding of medications :  yes  Involved in reintegration process: attending groups and has off grounds and off unit privileges   Trusting in relatoinship with psychiatrist:  Isabella Bai now  Overall status :  No changes  Recommended Treatment:    Medication changes:  1) none today    Non-pharmacological treatments  1) Continue with individual therapy, group therapy, milieu therapy and occupational therapy using recovery principles and psycho-education about accepting illness and need for treatment   2) encourage to take showers twice a week and cooperate with treatment and adl skills as per her behav  plan  3) another therapeutic pass with sister now that she did well  with the assigned staff escort to the park but it is now on hold due to corana virus  4) Prepare for the [facility] and encouraged to accepted rather than refuse it  Safety  1) Safety/communication plan established targeting dynamic risk factors above    Discharge Plan back to a [facility] at [address]  / Coordination of Care    Total floor / unit time spent today 15 minutes  Greater than 50% of total time was spent with the patient and / or family counseling and / or coordination of care  Receptive to listening and learning coping skills for management of symptoms and attending to ADLs  Patient's Rights, confidentiality and exceptions to confidentiality, use of automated medical record, [facility] adeline staff access to medical record, and consent to treatment reviewed      Allie Guzman MD

## 2020-03-19 NOTE — TREATMENT TEAM
BRADY GROUP NOTE  Attentive and participatory  Appropriate responses  03/18/20 1100   Activity/Group Checklist   Group Other (Comment)  (IMR)   Attendance Attended   Attendance Duration (min) 31-45   Interactions Interacted appropriately   Affect/Mood Appropriate   Goals Achieved Able to listen to others; Able to engage in interactions

## 2020-03-19 NOTE — PROGRESS NOTES
03/19/20 0900   Team Meeting   Meeting Type Daily Rounds   Team Members Present   Team Members Present Physician;Nurse;; Other (Discipline and Name)   Physician Team Member Dr Joni Tavarez Team Member Laure Avilez, LISA   Care Management Team Member Brenda Donis   Other (Discipline and Name) Jose Peraza, Ph D , [facility]     Patient attended [place] yesterday  Fair appetite yesterday  Patient presented paranoid stating she thought there was a pill hidden in her egg salad  Staff explained it could have been an egg shell and patient calmed down/laughed about it  Slept

## 2020-03-19 NOTE — PROGRESS NOTES
Maggie maintained on ongoing fall and SAFE precaution  Austin Primer in bed with eyes closed, breath even and unlabored   On O2 with humidifier @1L/m via nasal cannula  Continues rounding implemented   No somatic complaint overnight  No PRN needed for sleep aid   No indication of pain or discomfort   No respiratory distress   Will continue to monitor

## 2020-03-19 NOTE — PLAN OF CARE
Problem: Alteration in Thoughts and Perception  Goal: Verbalize thoughts and feelings  Description  Interventions:  - Promote a nonjudgmental and trusting relationship with the patient through active listening and therapeutic communication  - Assess patient's level of functioning, behavior and potential for risk  - Engage patient in 1 on 1 interactions for a minimum of 15 minutes each session  - Encourage patient to express fears, feelings, frustrations, and discuss symptoms    - Terrell patient to reality, help patient recognize reality-based thinking   - Administer medications as ordered and assess for potential side effects  - Provide the patient education related to the signs and symptoms of the illness and desired effects of prescribed medications  Outcome: Progressing  Goal: Agree to be compliant with medication regime, as prescribed and report medication side effects  Description  Interventions:  - Offer appropriate PRN medication and supervise ingestion; conduct aims, as needed   Outcome: Progressing  Goal: Attend and participate in unit activities, including therapeutic, recreational, and educational groups  Description  Interventions:  - Provide therapeutic and educational activities daily, encourage attendance and participation, and document same in the medical record     CERTIFIED PEER SPECIALIST INTERVENTIONS:    Complete peer assessment with patient to assess their needs and identify their goals to complete while in the recovery program as well as once discharged into the community  Patient will complete WRAP Plan, Crisis Plan and 5 Life Domains  Patient will attend 50% of groups offered on the unit  Patient will complete a goal card weekly  Outcome: Progressing     [address] is pleasant, but, still some paranoia evident @ times  Stopped eating her supper egg salad midway because "something hard" was encountered & feared someone had "put a pill" in her egg salad   Did laugh when it was presented to her that it is infinitely more likely that she encountered a chip of egg shell  Ate 50% of meal plus an Ensure  She came for scheduled medicines when same announced, declining only the Singulair  Isolative to room in free time  Did take part in PM Group, then, lengthy call w/sister  Appropriately anxious about news reports discussing COVID-19  Had an HS snack  Declined Incentive Spirometry as too full from snack to perform  Wearing now her QHS humidified nasal O2 @ 1L for bed

## 2020-03-19 NOTE — PLAN OF CARE
Problem: Alteration in Thoughts and Perception  Goal: Agree to be compliant with medication regime, as prescribed and report medication side effects  Description  Interventions:  - Offer appropriate PRN medication and supervise ingestion; conduct aims, as needed   Outcome: Progressing  Goal: Attend and participate in unit activities, including therapeutic, recreational, and educational groups  Description  Interventions:  - Provide therapeutic and educational activities daily, encourage attendance and participation, and document same in the medical record     CERTIFIED PEER SPECIALIST INTERVENTIONS:    Complete peer assessment with patient to assess their needs and identify their goals to complete while in the recovery program as well as once discharged into the community  Patient will complete WRAP Plan, Crisis Plan and 5 Life Domains  Patient will attend 50% of groups offered on the unit  Patient will complete a goal card weekly      Outcome: Progressing     Problem: Risk for Self Injury/Neglect  Goal: Verbalize thoughts and feelings  Description  Interventions:  - Assess and re-assess patient's lethality and potential for self-injury  - Engage patient in 1:1 interactions, daily, for a minimum of 15 minutes  - Encourage patient to express feelings, fears, frustrations, hopes  - Establish rapport/trust with patient   Outcome: Progressing    Mostly isolative to room, out for meds and meals, very preoccupied with medications but compliant, gait steady, denied pain, ate 25% of breakfast and 10% of lunch, behavior controlled

## 2020-03-19 NOTE — PLAN OF CARE
Problem: Alteration in Thoughts and Perception  Goal: Agree to be compliant with medication regime, as prescribed and report medication side effects  Description  Interventions:  - Offer appropriate PRN medication and supervise ingestion; conduct aims, as needed   Outcome: Not Progressing    Agustin Edgar refused protonix and synthroid this morning because she was "afraid" this was brought on because her synthroid 125mcg did not come in 1 tab form, so it was 100mcg +25mcg making it 2 tabs

## 2020-03-19 NOTE — PROGRESS NOTES
Progress Note - Jose Ramon Roblero 1955, 60 y o  female MRN: [id number]    Unit/Bed#: [facility] 110-02 Encounter: [id number]    Primary Care Provider: Richar Bass PA-C   Date and time admitted to hospital: 7/23/2019  5:30 PM        * Schizoaffective disorder, bipolar type [facility])  Assessment & Plan  Patient is now suspicious about the staff giving her the Synthroid as well as the medication for her stomach in the morning as she thought that they had tampered with the medications as well  She is still suspicious about some of the staff tampering with her oxygen concentrator inhalant as well  She is also anxious about patric the corona virus  She states that she was told that the [facility] care home will be accommodating her and now it appears that she may be willing to give it a try  Still preoccupied poorly groomed has not taken a shower since Sunday night and states she will take 1 tonight  She is preoccupied paranoid suspicious still psychosomatic coulee preoccupied with fear of death from choking or from heart attack off from shortness of breath  Eats poorly but sleeps well  She is 5 ft 4 and weighs about 145 lb within her ideal body weight  Review of systems unremarkable today     Current medications:    Current Facility-Administered Medications:     acetaminophen (TYLENOL) tablet 325 mg, 325 mg, Oral, Q6H PRN, Roberto Shankar MD    acetaminophen (TYLENOL) tablet 650 mg, 650 mg, Oral, Q6H PRN, Roberto Shankar MD    acetaminophen (TYLENOL) tablet 650 mg, 650 mg, Oral, Q8H PRN, Roberto Shankar MD    albuterol (PROVENTIL HFA,VENTOLIN HFA) inhaler 2 puff, 2 puff, Inhalation, Q4H PRN, Roberto Shankar MD, 2 puff at 10/11/19 0424    aluminum-magnesium hydroxide-simethicone (MYLANTA) 200-200-20 mg/5 mL oral suspension 15 mL, 15 mL, Oral, Q4H PRN, Roberto Shankar MD, 15 mL at 01/26/20 1021    ammonium lactate (LAC-HYDRIN) 12 % lotion 1 application, 1 application, Topical, BID PRN, [place] Vonnie, MD  [facility] benzonatate (TESSALON PERLES) capsule 100 mg, 100 mg, Oral, TID PRN, Tree Madden MD    benztropine (COGENTIN) injection 1 mg, 1 mg, Intramuscular, Q8H PRN, Tree Madden MD    carbamide peroxide (DEBROX) 6 5 % otic solution 5 drop, 5 drop, Left Ear, BID PRN, Isauro Lopez MD    cloZAPine (CLOZARIL) tablet 25 mg, 25 mg, Oral, BID, Tree Madden MD, 25 mg at 03/18/20 2125    cloZAPine (CLOZARIL) tablet 50 mg, 50 mg, Oral, BID, Tree Madden MD, 50 mg at 03/18/20 2125    docusate sodium (COLACE) capsule 100 mg, 100 mg, Oral, BID PRN, Tree Madden MD    EPINEPHrine PF (ADRENALIN) 1 mg/mL injection 0 15 mg, 0 15 mg, Intramuscular, Once PRN, Tree Madden MD    fluticasone-vilanterol (BREO ELLIPTA) 200-25 MCG/INH inhaler 1 puff, 1 puff, Inhalation, Daily, Tree Madden MD, 1 puff at 03/19/20 0912    ketotifen (ZADITOR) 0 025 % ophthalmic solution 1 drop, 1 drop, Right Eye, BID PRN, Tree Madden MD    levothyroxine tablet 125 mcg, 125 mcg, Oral, Early Morning, Tree Madden MD, 125 mcg at 03/18/20 0607    magnesium hydroxide (MILK OF MAGNESIA) 400 mg/5 mL oral suspension 30 mL, 30 mL, Oral, Daily PRN, Tree Madden MD    montelukast (SINGULAIR) tablet 10 mg, 10 mg, Oral, HS, Tree Madden MD, 10 mg at 02/22/20 2104    OLANZapine (ZyPREXA) IM injection 5 mg, 5 mg, Intramuscular, Q8H PRN, Tree Madden MD    OLANZapine (ZyPREXA) tablet 5 mg, 5 mg, Oral, Q8H PRN, Tree Madden MD    ondansetron (ZOFRAN-ODT) dispersible tablet 4 mg, 4 mg, Oral, Q6H PRN, Tree Madden MD, 4 mg at 12/09/19 1757    pantoprazole (PROTONIX) EC tablet 40 mg, 40 mg, Oral, Early Morning, Tree Madden MD, 40 mg at 03/18/20 0607    polyethylene glycol (MIRALAX) packet 17 g, 17 g, Oral, Daily PRN, Tree Madden MD    polyvinyl alcohol (LIQUIFILM TEARS) 1 4 % ophthalmic solution 1 drop, 1 drop, Both Eyes, Q3H PRN, Tree Madden MD    sertraline (ZOLOFT) tablet 175 mg, 175 mg, Oral, Daily, Tree Madden MD, 175 mg at 03/19/20 0904    sucralfate (CARAFATE) oral suspension 1,000 mg, 1,000 mg, Oral, BID, Cat Torres MD, 1,000 mg at 03/19/20 0609    theophylline (JEF-24) 24 hr capsule 200 mg, 200 mg, Oral, Daily, Mynor Terry MD, 200 mg at 03/19/20 1043    tiotropium (SPIRIVA) capsule for inhaler 18 mcg, 18 mcg, Inhalation, Daily, Mynor Terry MD, 18 mcg at 03/19/20 0912    traZODone (DESYREL) tablet 25 mg, 25 mg, Oral, HS PRN, Mynor Terry MD  Justification if on more than two antipsychotics: not applicable  Side effects if any: none    Risks , benefits, side effects and precautions of medications discussed with patient and reminded patient to let us know any problems with medications     Objective:     Vital Signs:  Vitals:    03/18/20 1610 03/18/20 1951 03/19/20 0700 03/19/20 0705   BP: 108/67 92/60 96/51 132/63   BP Location: Right arm Right arm Left arm Left arm   Pulse: 83 72 67 80   Resp: 16 16 18 18   Temp: (!) 97 2 °F (36 2 °C) 97 6 °F (36 4 °C) 97 9 °F (36 6 °C)    TempSrc: Temporal Temporal Temporal    SpO2: 95% 93% 94%    Weight:       Height:         Appearance:  age appropriate and older than stated age poorly groomed still argumentative demanding and not really wanting to go to the [place] personal care home and disheveled with hair uncombed wearing same clothing from yesterday   Behavior:  deviant, evasive and guarded suspicious as to the motives of staff   Speech:  delayed, increased latency of response and tangential    Mood:  anxious, euphoric and irritable    Affect:  increased in intensity, increased in range, mood-congruent and redirectable   Thought Process:  circumstantial, concrete, goal directed, illogical and perserverative with stilted speech   Thought Content:  delusions  grandiose and persecutory no current suicidal homicidal thoughts and under plans reported    No overt delusional experiences elicited other than some paranoia and suspiciousness as noted above about intentions of staff tampering with her medications and settings on her oxygen concentrator and her inhalant  Still psychosomatic with fear of death from choking on food or shortness of breath or heart attack  No compulsions noted  No preoccupation with violence or suicide  Perceptual Disturbances: None    Risk Potential: Inability to care for herself and refusal to take showers regularly   Sensorium:  person, place, time/date, situation, day of week, month of year, year and time   Cognition:  grossly intact not deficit in language or recent or remote memory, aware of current events   Consciousness:  alert and awake    Attention: Good   Intellect: At least average   Insight:  Limited   Judgment: fair       Motor Activity: no abnormal movements         Recent Labs:  Results Reviewed     None          I/O Past 24 hours:  No intake/output data recorded  No intake/output data recorded  Assessment / Plan:     Schizoaffective disorder, bipolar type ([facility] [address] )  Overall status:  improving    Reason for continued inpatient care: to assess ability to maintain improvement by attending to ADLs and taking showers regular and see how she does on outing with family after another outing with staff escort next week  Acceptance by patient: accepting now  Hopefulness in recovery: living at the [facility]  Understanding of medications :  yes  Involved in reintegration process: attending groups and has off grounds and off unit privileges   Trusting in relatoinship with psychiatrist:  Khalif Elliott now  Overall status :  No changes  Recommended Treatment:    Medication changes:  1) none today    Non-pharmacological treatments  1) Continue with individual therapy, group therapy, milieu therapy and occupational therapy using recovery principles and psycho-education about accepting illness and need for treatment     2) encourage to take showers twice a week and cooperate with treatment and adl skills as per her behav  plan  3) another therapeutic pass with sister now that she did well with the assigned staff escort to the park but it is now on hold due to corana virus  4) Prepare for the [address] [place] [address] Rd and encouraged to accepted rather than refuse it  Safety  1) Safety/communication plan established targeting dynamic risk factors above  Discharge Plan back to a [facility] at [address] / Coordination of Care    Total floor / unit time spent today 15 minutes  Greater than 50% of total time was spent with the patient and / or family counseling and / or coordination of care  Receptive to listening and learning coping skills for management of symptoms and attending to ADLs  Patient's Rights, confidentiality and exceptions to confidentiality, use of automated medical record, [facility] adeline staff access to medical record, and consent to treatment reviewed      Roberto Shankar MD

## 2020-03-19 NOTE — PROGRESS NOTES
03/17/20 0940   Team Meeting   Meeting Type Tx Team Meeting   Initial Conference Date 03/17/20   Next Conference Date 03/24/20   Team Members Present   Team Members Present Physician;Nurse;; Other (Discipline and Name)   Physician Team Member Dr Surinder Robbins Team Member Ruddy Montoya, RN   Care Management Team Member Brenda Castaneda   Other (Discipline and Name) Erika Puga, JONATHON   Patient/Family Present   Patient Present Yes   Patient's Family Present No     Patient attended treatment team meeting this morning without her self assessment completed  Patient attended 32% of groups offered last week  Patient still reports concerns about moving into the [facility] with how the room is arranged  CM informed patient that this will be discussed with Janis at [facility] whom has already indicated they will assist patient in arranging her room how she would like it, within reason and their regulations  Patient appeared to accept this plan  Team and patient completed risk assessment and the patient did not verbalize any desire to elope from the program  Patient verbalized understanding of consequences of eloping from treatment while on a commitment  Patient verbalized no further questions or concerns at the conclusion of the meeting

## 2020-03-20 NOTE — PLAN OF CARE
Problem: Alteration in Thoughts and Perception  Goal: Agree to be compliant with medication regime, as prescribed and report medication side effects  Description  Interventions:  - Offer appropriate PRN medication and supervise ingestion; conduct aims, as needed   Outcome: Progressing  Goal: Attend and participate in unit activities, including therapeutic, recreational, and educational groups  Description  Interventions:  - Provide therapeutic and educational activities daily, encourage attendance and participation, and document same in the medical record     CERTIFIED PEER SPECIALIST INTERVENTIONS:    Complete peer assessment with patient to assess their needs and identify their goals to complete while in the recovery program as well as once discharged into the community  Patient will complete WRAP Plan, Crisis Plan and 5 Life Domains  Patient will attend 50% of groups offered on the unit  Patient will complete a goal card weekly  Outcome: Michelle Scott has been isolative to her room for the majority of the shift, only coming out of her room for medications and meals  Brightens upon approach but expresses somatic complaints of fatigue when encouraged by staff to attend   Napping frequently throughout the shift  Pleasant during interactions and compliant with her medications when approached, but declined use of her incentive spirometer for this writer  Behaviors controlled  Will continue to monitor for changes

## 2020-03-20 NOTE — PROGRESS NOTES
03/20/20 0800   Team Meeting   Meeting Type Daily Rounds   Team Members Present   Team Members Present Physician;Nurse; Other (Discipline and Name)   Physician Team Member Dr Birgit Castro Team Member Desmond Dawn RN   Other (Discipline and Name) Jasvir Hopkins LCSW     Patient attended [facility]  She demonstrated some paranoia about her medications, as a medication that was usually given in one tablet was given in two tablets (same dose)  No other changes

## 2020-03-20 NOTE — PROGRESS NOTES
2140 Yanna Singh did not attend the recreational feature film  She did perform Incentive Spirometry achieving volumes of 1250ml consistently  She did have HS snack, came up for HS medicine except the Singulair  She remained suspicious about the 2 pills (100mcg + 25 mcg) of Synthroid that were offered to her this morning  This nurse asked pharmacist to adjust order so only gets the expected 125mcg tablet  She is wearing now her QHS humidified nasal O2 @ 1L for bed

## 2020-03-20 NOTE — ASSESSMENT & PLAN NOTE
Patient is now leaning towards giving it a try and working with the staff at the [facility] personal care home rather than refuse it completely  She was again suspicious about the 2 pills of Synthroid as 100 micro g +25 micro g to make it 125 micro g but the nurse was able to talk to pharmacy about giving it as 1 pill instead of 2 separate pills at her request   She still questions whether staff is tampering with her oxygen concentrator or the inhalant  However today she was not complaining about it unless asked  She did take a shower as she was supposed to as today and appears to be better groomed and well kept with clean clothes and well groomed and combed hair  She was found sitting on her bed and tells me that she is trying to attend as many groups as she can  She is still waiting to hear from her sister to be able to go out but passes are now on hold because of corona virus restrictions and she understands that  She still has some underlying paranoia but no other delusions elicited  She continues to talk in a stilted manner as if in a court of law  The grooming is better today  Compliant with medications with no side effects or problems and denies any excessive depression lately  She is concerned about her son who is serving somewhere in the [place] for the [employer] working as a nurse  System review is unremarkable     Current medications:    Current Facility-Administered Medications:     acetaminophen (TYLENOL) tablet 325 mg, 325 mg, Oral, Q6H PRN, Felisa Florence MD    acetaminophen (TYLENOL) tablet 650 mg, 650 mg, Oral, Q6H PRN, Felisa Florence MD    acetaminophen (TYLENOL) tablet 650 mg, 650 mg, Oral, Q8H PRN, Felisa Florence MD    albuterol (PROVENTIL HFA,VENTOLIN HFA) inhaler 2 puff, 2 puff, Inhalation, Q4H PRN, Felisa Florence MD, 2 puff at 10/11/19 0424    aluminum-magnesium hydroxide-simethicone (MYLANTA) 200-200-20 mg/5 mL oral suspension 15 mL, 15 mL, Oral, Q4H PRN, Felisa Florence MD, 15 mL at 01/26/20 1021    ammonium lactate (LAC-HYDRIN) 12 % lotion 1 application, 1 application, Topical, BID PRN, Isidoro Gonzalez MD    benzonatate (TESSALON PERLES) capsule 100 mg, 100 mg, Oral, TID PRN, Isidoro Gonzalez MD    benztropine (COGENTIN) injection 1 mg, 1 mg, Intramuscular, Q8H PRN, Isidoro Gonzalez MD    carbamide peroxide (DEBROX) 6 5 % otic solution 5 drop, 5 drop, Left Ear, BID PRN, Kyrie Zambrano MD    cloZAPine (CLOZARIL) tablet 25 mg, 25 mg, Oral, BID, Isidoro Gonzalez MD, 25 mg at 03/19/20 2135    cloZAPine (CLOZARIL) tablet 50 mg, 50 mg, Oral, BID, Isidoro Gonzalez MD, 50 mg at 03/19/20 2135    docusate sodium (COLACE) capsule 100 mg, 100 mg, Oral, BID PRN, Isidoro Gonzalez MD    EPINEPHrine PF (ADRENALIN) 1 mg/mL injection 0 15 mg, 0 15 mg, Intramuscular, Once PRN, Isidoro Gonzalez MD    fluticasone-vilanterol (BREO ELLIPTA) 200-25 MCG/INH inhaler 1 puff, 1 puff, Inhalation, Daily, Isidoro Gonzalez MD, 1 puff at 03/20/20 0830    ketotifen (ZADITOR) 0 025 % ophthalmic solution 1 drop, 1 drop, Right Eye, BID PRN, Isidoro Gonzalez MD    levothyroxine tablet 125 mcg, 125 mcg, Oral, Early Morning, Isidoro Gonzalez MD, 125 mcg at 03/20/20 3964    magnesium hydroxide (MILK OF MAGNESIA) 400 mg/5 mL oral suspension 30 mL, 30 mL, Oral, Daily PRN, Isidoro Gonzalez MD    montelukast (SINGULAIR) tablet 10 mg, 10 mg, Oral, HS, Isidoro Gonzalez MD, 10 mg at 02/22/20 2104    OLANZapine (ZyPREXA) IM injection 5 mg, 5 mg, Intramuscular, Q8H PRN, Isidoro Gonzalez MD    OLANZapine (ZyPREXA) tablet 5 mg, 5 mg, Oral, Q8H PRN, Isidoro Gonzalez MD    ondansetron (ZOFRAN-ODT) dispersible tablet 4 mg, 4 mg, Oral, Q6H PRN, Isidoro Gonzalez MD, 4 mg at 12/09/19 1757    pantoprazole (PROTONIX) EC tablet 40 mg, 40 mg, Oral, Early Morning, Isidoro Gonzalez MD, 40 mg at 03/20/20 8365    polyethylene glycol (MIRALAX) packet 17 g, 17 g, Oral, Daily PRN, Isidoro Gonzalez MD    polyvinyl alcohol (LIQUIFILM TEARS) 1 4 % ophthalmic solution 1 drop, 1 drop, Both Eyes, Q3H PRN, MD Gee Merino sertraline (ZOLOFT) tablet 175 mg, 175 mg, Oral, Daily, Ivonne Nevarez MD, 175 mg at 03/20/20 0840    sucralfate (CARAFATE) oral suspension 1,000 mg, 1,000 mg, Oral, BID, Luis aWng MD, 1,000 mg at 03/20/20 0614    theophylline (JEF-24) 24 hr capsule 200 mg, 200 mg, Oral, Daily, Ivonne Nevarez MD, 200 mg at 03/20/20 0830    tiotropium [facility]) capsule for inhaler 18 mcg, 18 mcg, Inhalation, Daily, Ivonne Nevarez MD, 18 mcg at 03/20/20 0830    traZODone (DESYREL) tablet 25 mg, 25 mg, Oral, HS PRN, Ivonne Nevarez MD  Justification if on more than two antipsychotics: not applicable  Side effects if any: none    Risks , benefits, side effects and precautions of medications discussed with patient and reminded patient to let us know any problems with medications     Objective:     Vital Signs:  Vitals:    03/19/20 0705 03/19/20 1500 03/19/20 2000 03/20/20 0700   BP: 132/63 96/60 106/60 100/60   BP Location: Left arm Left arm Left arm Right arm   Pulse: 80 73 76 67   Resp: 18 15 15 18   Temp:  (!) 97 °F (36 1 °C) (!) 97 °F (36 1 °C) (!) 97 3 °F (36 3 °C)   TempSrc:  Temporal Temporal    SpO2:  92% 92%    Weight:       Height:         Appearance:  age appropriate and older than stated age better groomed with well combed hair  Behavior:  deviant, evasive and guarded Still somewhat argumentative about the room she was assigned at the [facility]  Somewhat suspicious paranoid   Speech:  delayed, increased latency of response and tangential    Mood:  anxious, euphoric and irritable    Affect:  increased in intensity, increased in range, mood-congruent and redirectable   Thought Process:  circumstantial, concrete, goal directed, illogical and perserverative with stilted speech   Thought Content:  delusions  grandiose and persecutory no current suicidal homicidal thoughts intent or plans reported    No overt delusions elicited other than some grandiosity and suspicious in his as noted above about people staff putting with her medications and oxygen concentrator and the inhalant  No current state phobias obsessions or compulsions reported  Still with psychosomatic symptoms as stated above  No preoccupation with violence or suicide   Perceptual Disturbances: None    Risk Potential: Inability to care for herself and refusal to take showers regularly   Sensorium:  person, place, time/date, situation, day of week, month of year, year and time   Cognition:  grossly intact not deficit in language, no deficit in recent or remote memory  Aware of current events  Consciousness:  alert and awake    Attention: Good   Intellect: At least average   Insight:  Limited   Judgment: fair       Motor Activity: no abnormal movements         Recent Labs:  Results Reviewed     None          I/O Past 24 hours:  No intake/output data recorded  No intake/output data recorded  Assessment / Plan:     Schizoaffective disorder, [facility]  Overall status:  improving    Reason for continued inpatient care: to assess ability to maintain improvement by attending to ADLs and taking showers regular and see how she does on outing with family after another outing with staff escort next week  Acceptance by patient: accepting now  Hopefulness in recovery: living at the [facility]  Understanding of medications :  yes  Involved in reintegration process: attending groups and has off grounds and off unit privileges   Trusting in relatoinship with psychiatrist:  Marine Sanabria now  Overall status :  No changes  Recommended Treatment:    Medication changes:  1) none today    Non-pharmacological treatments  1) Continue with individual therapy, group therapy, milieu therapy and occupational therapy using recovery principles and psycho-education about accepting illness and need for treatment     2) encourage to take showers twice a week and cooperate with treatment and adl skills as per her behav  plan  3) another therapeutic pass with sister now that she did well  with the assigned staff escort to the park but it is now on hold due to corana virus  4) Prepare for the [address] [place] [address] Rd and encouraged to accept  rather than refuse it  Safety  1) Safety/communication plan established targeting dynamic risk factors above  Discharge Plan back to a [facility] at [address] / Coordination of Care    Total floor / unit time spent today 15 minutes  Greater than 50% of total time was spent with the patient and / or family counseling and / or coordination of care  Receptive to listening and learning coping skills for management of symptoms and attending to ADLs  Patient's Rights, confidentiality and exceptions to confidentiality, use of automated medical record, [facility]eline staff access to medical record, and consent to treatment reviewed      Ivonne Nevarez MD

## 2020-03-20 NOTE — PLAN OF CARE
Problem: Alteration in Thoughts and Perception  Goal: Verbalize thoughts and feelings  Description  Interventions:  - Promote a nonjudgmental and trusting relationship with the patient through active listening and therapeutic communication  - Assess patient's level of functioning, behavior and potential for risk  - Engage patient in 1 on 1 interactions for a minimum of 15 minutes each session  - Encourage patient to express fears, feelings, frustrations, and discuss symptoms    - [place] patient to reality, help patient recognize reality-based thinking   - Administer medications as ordered and assess for potential side effects  - Provide the patient education related to the signs and symptoms of the illness and desired effects of prescribed medications  Outcome: Progressing  Goal: Agree to be compliant with medication regime, as prescribed and report medication side effects  Description  Interventions:  - Offer appropriate PRN medication and supervise ingestion; conduct aims, as needed   Outcome: Progressing     Problem: Alteration in Thoughts and Perception  Goal: Attend and participate in unit activities, including therapeutic, recreational, and educational groups  Description  Interventions:  - Provide therapeutic and educational activities daily, encourage attendance and participation, and document same in the medical record     CERTIFIED PEER SPECIALIST INTERVENTIONS:    Complete peer assessment with patient to assess their needs and identify their goals to complete while in the recovery program as well as once discharged into the community  Patient will complete WRAP Plan, Crisis Plan and 5 Life Domains  Patient will attend 50% of groups offered on the unit  Patient will complete a goal card weekly      Outcome: Not Progressing     Problem: Depression  Goal: Refrain from isolation  Description  Interventions:  - Develop a trusting relationship   - Encourage socialization   Outcome: Not Progressing Kisha Dale is isolative to her room & bed sleeping w/exception of coming out for supper medicine & meal  Ate 50%; the egg salad & had an Ensure  She is pleasant, but, views the one MHT w/suspicion, saying he is the only one that gets the low BP's on her (an erroneous claim)  Was encouraged to be up & about, drink more water to keep BP up  Grudging compliance; @ least walked around room, sat upright on bed, had some water   Did not respond to invitation to [employer] [id number]

## 2020-03-20 NOTE — PROGRESS NOTES
Maggie maintained on ongoing fall and SAFE precaution  Brothers Parsons in bed with eyes closed, breath even and unlabored   On O2 with humidifier @1L/m via nasal cannula  Continues rounding implemented   No somatic complaint overnight  No PRN needed for sleep aid   No indication of pain or discomfort   No respiratory distress   Will continue to monitor

## 2020-03-20 NOTE — PROGRESS NOTES
03/20/20 1100   Activity/Group Checklist   Group Other (Comment)  (IMR - Weekly Goal Review)   Attendance Attended   Attendance Duration (min) 31-45   Interactions Interacted appropriately   Affect/Mood Appropriate   Goals Achieved Displayed empathy;Discussed coping strategies; Able to engage in interactions; Able to listen to others; Able to reflect/comment on own behavior;Able to self-disclose; Able to recieve feedback

## 2020-03-20 NOTE — PROGRESS NOTES
Progress Note - Dillon Butt 1955, 60 y o  female MRN: [id number]    Unit/Bed#: [facility] 110-02 Encounter: [id number]    Primary Care Provider: Kera Johnson PA-C   Date and time admitted to hospital: 7/23/2019  5:30 PM        * Schizoaffective disorder, bipolar type [facility])  Assessment & Plan  Patient is now leaning towards giving it a try and working with the staff at the [facility] rather than refuse it completely  She was again suspicious about the 2 pills of Synthroid as 100 micro g +25 micro g to make it 125 micro g but the nurse was able to talk to pharmacy about giving it as 1 pill instead of 2 separate pills at her request   She still questions whether staff is tampering with her oxygen concentrator or the inhalant  However today she was not complaining about it unless asked  She did take a shower as she was supposed to as today and appears to be better groomed and well kept with clean clothes and well groomed and combed hair  She was found sitting on her bed and tells me that she is trying to attend as many groups as she can  She is still waiting to hear from her sister to be able to go out but passes are now on hold because of corona virus restrictions and she understands that  She still has some underlying paranoia but no other delusions elicited  She continues to talk in a stilted manner as if in a court of law  The grooming is better today  Compliant with medications with no side effects or problems and denies any excessive depression lately  She is concerned about her son who is serving somewhere in the [place] for the [employer] working as a nurse  System review is unremarkable     Current medications:    Current Facility-Administered Medications:     acetaminophen (TYLENOL) tablet 325 mg, 325 mg, Oral, Q6H PRN, Roberto Colorado MD    acetaminophen (TYLENOL) tablet 650 mg, 650 mg, Oral, Q6H PRN, Roberto Colorado MD    acetaminophen (TYLENOL) tablet 650 mg, 650 mg, Oral, Q8H PRN, Krystyna Mcclain MD    albuterol (PROVENTIL HFA,VENTOLIN HFA) inhaler 2 puff, 2 puff, Inhalation, Q4H PRN, Krystyna Mcclain MD, 2 puff at 10/11/19 0424    aluminum-magnesium hydroxide-simethicone (MYLANTA) 200-200-20 mg/5 mL oral suspension 15 mL, 15 mL, Oral, Q4H PRN, Krystyna Mcclain MD, 15 mL at 01/26/20 1021    ammonium lactate (LAC-HYDRIN) 12 % lotion 1 application, 1 application, Topical, BID PRN, Krystyna Mcclain MD    benzonatate (TESSALON PERLES) capsule 100 mg, 100 mg, Oral, TID PRN, Krystyna Mcclain MD    benztropine (COGENTIN) injection 1 mg, 1 mg, Intramuscular, Q8H PRN, Krystyna Mcclain MD    carbamide peroxide (DEBROX) 6 5 % otic solution 5 drop, 5 drop, Left Ear, BID PRN, Amy Powell MD    cloZAPine (CLOZARIL) tablet 25 mg, 25 mg, Oral, BID, Krystyna Mcclain MD, 25 mg at 03/19/20 2135    cloZAPine (CLOZARIL) tablet 50 mg, 50 mg, Oral, BID, Krystyna Mcclain MD, 50 mg at 03/19/20 2135    docusate sodium (COLACE) capsule 100 mg, 100 mg, Oral, BID PRN, Krystyna Mcclain MD    EPINEPHrine PF (ADRENALIN) 1 mg/mL injection 0 15 mg, 0 15 mg, Intramuscular, Once PRN, Krystyna Mcclain MD    fluticasone-vilanterol (BREO ELLIPTA) 200-25 MCG/INH inhaler 1 puff, 1 puff, Inhalation, Daily, Krystyna Mcclain MD, 1 puff at 03/20/20 0830    ketotifen (ZADITOR) 0 025 % ophthalmic solution 1 drop, 1 drop, Right Eye, BID PRN, Krystyna Mcclain MD    levothyroxine tablet 125 mcg, 125 mcg, Oral, Early Morning, Krystyna Mcclain MD, 125 mcg at 03/20/20 8653    magnesium hydroxide (MILK OF MAGNESIA) 400 mg/5 mL oral suspension 30 mL, 30 mL, Oral, Daily PRN, Krystyna Mcclain MD    montelukast (SINGULAIR) tablet 10 mg, 10 mg, Oral, HS, Krystyna Mcclain MD, 10 mg at 02/22/20 2104    OLANZapine (ZyPREXA) IM injection 5 mg, 5 mg, Intramuscular, Q8H PRN, Krystyna Mcclain MD    OLANZapine (ZyPREXA) tablet 5 mg, 5 mg, Oral, Q8H PRN, Krystyna Mcclain MD    ondansetron (ZOFRAN-ODT) dispersible tablet 4 mg, 4 mg, Oral, Q6H PRN, Krystyna Mcclain MD, 4 mg at 12/09/19 1757    pantoprazole (PROTONIX) EC tablet 40 mg, 40 mg, Oral, Early Morning, Jaz Beasley MD, 40 mg at 03/20/20 2007    polyethylene glycol (MIRALAX) packet 17 g, 17 g, Oral, Daily PRN, Jaz Beasley MD    polyvinyl alcohol (LIQUIFILM TEARS) 1 4 % ophthalmic solution 1 drop, 1 drop, Both Eyes, Q3H PRN, Jaz Beasley MD    sertraline (ZOLOFT) tablet 175 mg, 175 mg, Oral, Daily, Jaz Beasley MD, 175 mg at 03/20/20 0840    sucralfate (CARAFATE) oral suspension 1,000 mg, 1,000 mg, Oral, BID, Maggie Andrade MD, 1,000 mg at 03/20/20 0614    theophylline (JFE-24) 24 hr capsule 200 mg, 200 mg, Oral, Daily, Jaz Beasley MD, 200 mg at 03/20/20 0830    tiotropium (SPIRIVA) capsule for inhaler 18 mcg, 18 mcg, Inhalation, Daily, Jaz Beasley MD, 18 mcg at 03/20/20 0830    traZODone (DESYREL) tablet 25 mg, 25 mg, Oral, HS PRN, Jaz Beasley MD  Justification if on more than two antipsychotics: not applicable  Side effects if any: none    Risks , benefits, side effects and precautions of medications discussed with patient and reminded patient to let us know any problems with medications     Objective:     Vital Signs:  Vitals:    03/19/20 0705 03/19/20 1500 03/19/20 2000 03/20/20 0700   BP: 132/63 96/60 106/60 100/60   BP Location: Left arm Left arm Left arm Right arm   Pulse: 80 73 76 67   Resp: 18 15 15 18   Temp:  (!) 97 °F (36 1 °C) (!) 97 °F (36 1 °C) (!) 97 3 °F (36 3 °C)   TempSrc:  Temporal Temporal    SpO2:  92% 92%    Weight:       Height:         Appearance:  age appropriate and older than stated age better groomed with well combed hair  Behavior:  deviant, evasive and guarded Still somewhat argumentative about the room she was assigned at the [facility]    Somewhat suspicious paranoid   Speech:  delayed, increased latency of response and tangential    Mood:  anxious, euphoric and irritable    Affect:  increased in intensity, increased in range, mood-congruent and redirectable   Thought Process:  circumstantial, concrete, goal directed, illogical and perserverative with stilted speech   Thought Content:  delusions  grandiose and persecutory no current suicidal homicidal thoughts intent or plans reported  No overt delusions elicited other than some grandiosity and suspicious in his as noted above about people staff putting with her medications and oxygen concentrator and the inhalant  No current state phobias obsessions or compulsions reported  Still with psychosomatic symptoms as stated above  No preoccupation with violence or suicide   Perceptual Disturbances: None    Risk Potential: Inability to care for herself and refusal to take showers regularly   Sensorium:  person, place, time/date, situation, day of week, month of year, year and time   Cognition:  grossly intact not deficit in language, no deficit in recent or remote memory  Aware of current events  Consciousness:  alert and awake    Attention: Good   Intellect: At least average   Insight:  Limited   Judgment: fair       Motor Activity: no abnormal movements         Recent Labs:  Results Reviewed     None          I/O Past 24 hours:  No intake/output data recorded  No intake/output data recorded          Assessment / Plan:     Schizoaffective disorder, bipolar type ([facility] )  Overall status:  improving    Reason for continued inpatient care: to assess ability to maintain improvement by attending to ADLs and taking showers regular and see how she does on outing with family after another outing with staff escort next week  Acceptance by patient: accepting now  Hopefulness in recovery: living at the [facility]  Understanding of medications :  yes  Involved in reintegration process: attending groups and has off grounds and off unit privileges   Trusting in relatoinship with psychiatrist:  Kathy Calero now  Overall status :  No changes  Recommended Treatment:    Medication changes:  1) none today    Non-pharmacological treatments  1) Continue with individual therapy, group therapy, milieu therapy and occupational therapy using recovery principles and psycho-education about accepting illness and need for treatment   2) encourage to take showers twice a week and cooperate with treatment and adl skills as per her behav  plan  3) another therapeutic pass with sister now that she did well  with the assigned staff escort to the park but it is now on hold due to corana virus  4) Prepare for the [address] [place] [address] Rd and encouraged to accept  rather than refuse it  Safety  1) Safety/communication plan established targeting dynamic risk factors above  Discharge Plan back to a [facility] at [address] / Coordination of Care    Total floor / unit time spent today 15 minutes  Greater than 50% of total time was spent with the patient and / or family counseling and / or coordination of care  Receptive to listening and learning coping skills for management of symptoms and attending to ADLs  Patient's Rights, confidentiality and exceptions to confidentiality, use of automated medical record, Jeb Patrick staff access to medical record, and consent to treatment reviewed      Sandhya Bolaños MD

## 2020-03-21 NOTE — PLAN OF CARE
Problem: Alteration in Thoughts and Perception  Goal: Verbalize thoughts and feelings  Description  Interventions:  - Promote a nonjudgmental and trusting relationship with the patient through active listening and therapeutic communication  - Assess patient's level of functioning, behavior and potential for risk  - Engage patient in 1 on 1 interactions for a minimum of 15 minutes each session  - Encourage patient to express fears, feelings, frustrations, and discuss symptoms    - [place] patient to reality, help patient recognize reality-based thinking   - Administer medications as ordered and assess for potential side effects  - Provide the patient education related to the signs and symptoms of the illness and desired effects of prescribed medications  Outcome: Progressing  Goal: Agree to be compliant with medication regime, as prescribed and report medication side effects  Description  Interventions:  - Offer appropriate PRN medication and supervise ingestion; conduct aims, as needed   Outcome: Progressing  Goal: Attend and participate in unit activities, including therapeutic, recreational, and educational groups  Description  Interventions:  - Provide therapeutic and educational activities daily, encourage attendance and participation, and document same in the medical record     CERTIFIED PEER SPECIALIST INTERVENTIONS:    Complete peer assessment with patient to assess their needs and identify their goals to complete while in the recovery program as well as once discharged into the community  Patient will complete WRAP Plan, Crisis Plan and 5 Life Domains  Patient will attend 50% of groups offered on the unit  Patient will complete a goal card weekly      Outcome: Progressing  Goal: Complete daily ADLs, including personal hygiene independently, as able  Description  Interventions:  - Observe, teach, and assist patient with ADLS  - Monitor and promote a balance of rest/activity, with adequate nutrition and elimination   Outcome: Progressing     Problem: Alteration in Orientation  Goal: Interact with staff daily  Description  Interventions:  - Assess and re-assess patient's level of orientation  - Engage patient in 1 on 1 interactions, daily, for a minimum of 15 minutes   - Establish rapport/trust with patient   Outcome: Progressing     Problem: SAFETY ADULT  Goal: Patient will remain free of falls  Description  INTERVENTIONS:  - Assess patient frequently for physical needs  -  Identify cognitive and physical deficits and behaviors that affect risk of falls  -  Counselor fall precautions as indicated by assessment   - Educate patient/family on patient safety including physical limitations  - Instruct patient to call for assistance with activity based on assessment  - Modify environment to reduce risk of injury  - Consider OT/PT consult to assist with strengthening/mobility  Outcome: Progressing     Problem: Depression  Goal: Refrain from isolation  Description  Interventions:  - Develop a trusting relationship   - Encourage socialization   Outcome: Not Progressing  Goal: Refrain from self-neglect  Outcome: Not Progressing     Cash Holland was in bed sleeping at the beginning of the shift  She always lays on her right side facing the wall  Only came out for medication and meal/snack  Needs prompting to come for medication  Refused Carafate at 1645  "It is too close to the Clozaril"  Will not come for medication on her own  Ate 100% of her meal and drank all of the Ensure  Social with peers and staff when out for meal  After eating went back to her room  No BM or shower  Did not attend evening group  Came for HS medication with prompting  Ate snack  Oxygen saturation 93% on room air prior to oxygen 1 liter nasal cannula applied  Continue to monitor  Precautions maintained

## 2020-03-21 NOTE — PROGRESS NOTES
Progress Note - Behavioral Health   Veronica Romero 60 y o  female MRN: [id number]  [facility] Encounter: [id number]    Seen, chart reviewed, discussed with staff  Nursing staff notes no significant change the past 24 hours  States that patient remains somatically preoccupied  She is sleeping well and eating adequately  Compliant with medications  Patient states that she feels lightheaded this morning  This is not a new complaint  Patient does acknowledge she has not been drinking enough fluids  She is eating adequately and Reports that she is sleeping well at night  States that her anxiety and depression have been improving  Denies any suicidal or homicidal ideation  She has been attending groups at times  Denies auditory or visual hallucinations  Hygiene is adequate today  Denies any other physical complaints or concerns at this time      Behavior over the last 24 hours:  unchanged  Sleep: normal  Appetite: normal  Medication side effects: No  ROS: Lightheaded  BP (!) 88/52 (BP Location: Left arm)   Pulse 60   Temp 97 8 °F (36 6 °C) (Temporal)   Resp 20   Ht 5' 4" (1 626 m)   Wt 66 kg (145 lb 8 oz)   SpO2 94%   BMI 24 98 kg/m²     Medications:   Current Facility-Administered Medications   Medication Dose Route Frequency    acetaminophen (TYLENOL) tablet 325 mg  325 mg Oral Q6H PRN    acetaminophen (TYLENOL) tablet 650 mg  650 mg Oral Q6H PRN    acetaminophen (TYLENOL) tablet 650 mg  650 mg Oral Q8H PRN    albuterol (PROVENTIL HFA,VENTOLIN HFA) inhaler 2 puff  2 puff Inhalation Q4H PRN    aluminum-magnesium hydroxide-simethicone (MYLANTA) 200-200-20 mg/5 mL oral suspension 15 mL  15 mL Oral Q4H PRN    ammonium lactate (LAC-HYDRIN) 12 % lotion 1 application  1 application Topical BID PRN    benzonatate (TESSALON PERLES) capsule 100 mg  100 mg Oral TID PRN    benztropine (COGENTIN) injection 1 mg  1 mg Intramuscular Q8H PRN    carbamide peroxide (DEBROX) 6 5 % otic solution 5 drop  5 drop Left Ear BID PRN    cloZAPine (CLOZARIL) tablet 25 mg  25 mg Oral BID    cloZAPine (CLOZARIL) tablet 50 mg  50 mg Oral BID    docusate sodium (COLACE) capsule 100 mg  100 mg Oral BID PRN    EPINEPHrine PF (ADRENALIN) 1 mg/mL injection 0 15 mg  0 15 mg Intramuscular Once PRN    fluticasone-vilanterol (BREO ELLIPTA) 200-25 MCG/INH inhaler 1 puff  1 puff Inhalation Daily    ketotifen (ZADITOR) 0 025 % ophthalmic solution 1 drop  1 drop Right Eye BID PRN    levothyroxine tablet 125 mcg  125 mcg Oral Early Morning    magnesium hydroxide (MILK OF MAGNESIA) 400 mg/5 mL oral suspension 30 mL  30 mL Oral Daily PRN    montelukast (SINGULAIR) tablet 10 mg  10 mg Oral HS    OLANZapine (ZyPREXA) IM injection 5 mg  5 mg Intramuscular Q8H PRN    OLANZapine (ZyPREXA) tablet 5 mg  5 mg Oral Q8H PRN    ondansetron (ZOFRAN-ODT) dispersible tablet 4 mg  4 mg Oral Q6H PRN    pantoprazole (PROTONIX) EC tablet 40 mg  40 mg Oral Early Morning    polyethylene glycol (MIRALAX) packet 17 g  17 g Oral Daily PRN    polyvinyl alcohol (LIQUIFILM TEARS) 1 4 % ophthalmic solution 1 drop  1 drop Both Eyes Q3H PRN    sertraline (ZOLOFT) tablet 175 mg  175 mg Oral Daily    sucralfate (CARAFATE) oral suspension 1,000 mg  1,000 mg Oral BID    theophylline (JEF-24) 24 hr capsule 200 mg  200 mg Oral Daily    tiotropium (SPIRIVA) capsule for inhaler 18 mcg  18 mcg Inhalation Daily    traZODone (DESYREL) tablet 25 mg  25 mg Oral HS PRN       Labs:   No results displayed because visit has over 200 results            Mental Status Evaluation:  Appearance:  age appropriate and casually dressed   Behavior:  Calm, cooperative, good eye contact   Speech:  normal pitch and normal volume   Mood:  Less anxious, less depressed   Affect:  labile   Thought Process:  concrete and goal directed   Thought Content:  No current delusions voiced, somatic preoccupations   Perceptual Disturbances: None   Risk Potential: Denies suicidal or homicidal ideation   Sensorium:  person, place and time/date   Cognition:  grossly intact   Consciousness:  alert and awake    Attention: attention span appeared shorter than expected for age   Insight:  limited   Judgment: fair   Gait/Station: Patient seated on the bed, not observed   Motor Activity: no abnormal movements     Progress Toward Goals:  Minimal change    Assessment/Plan   Principal Problem:    Schizoaffective disorder, bipolar type ([facility])  Active Problems:    COPD with asthma ([facility] )    Acquired hypothyroidism    Gastroesophageal reflux disease without esophagitis    At risk for aspiration      Recommended Treatment:   Continue medications and treatment plan per Dr Carmel Yanez 75 mg b i d  CBC with diff from March 13, 2020 was reviewed, ANC within normal limits  Zoloft 175 mg daily      Continue with group therapy, milieu therapy and occupational therapy  Risks, benefits and possible side effects of Medications:   Risks, benefits, and possible side effects of medications explained to patient and patient verbalizes understanding  Counseling / Coordination of Care  Total floor / unit time spent today 25 minutes  Greater than 50% of total time was spent with the patient and / or family counseling and / or coordination of care   A description of the counseling / coordination of care:  Medication management, chart review, patient interview

## 2020-03-21 NOTE — PROGRESS NOTES
Staci Baum has been asleep and laying on her right side facing the wall  Respirations easy and non labored with oxygen on 1 liter (humidified) via nasal cannula  No issues or behaviors  Continue to monitor  Precautions maintained  Compliant with medication

## 2020-03-21 NOTE — PLAN OF CARE
Problem: Alteration in Thoughts and Perception  Goal: Verbalize thoughts and feelings  Description  Interventions:  - Promote a nonjudgmental and trusting relationship with the patient through active listening and therapeutic communication  - Assess patient's level of functioning, behavior and potential for risk  - Engage patient in 1 on 1 interactions for a minimum of 15 minutes each session  - Encourage patient to express fears, feelings, frustrations, and discuss symptoms    - [place] patient to reality, help patient recognize reality-based thinking   - Administer medications as ordered and assess for potential side effects  - Provide the patient education related to the signs and symptoms of the illness and desired effects of prescribed medications  Outcome: Progressing  Goal: Agree to be compliant with medication regime, as prescribed and report medication side effects  Description  Interventions:  - Offer appropriate PRN medication and supervise ingestion; conduct aims, as needed   Outcome: Progressing  Goal: Attend and participate in unit activities, including therapeutic, recreational, and educational groups  Description  Interventions:  - Provide therapeutic and educational activities daily, encourage attendance and participation, and document same in the medical record     CERTIFIED PEER SPECIALIST INTERVENTIONS:    Complete peer assessment with patient to assess their needs and identify their goals to complete while in the recovery program as well as once discharged into the community  Patient will complete WRAP Plan, Crisis Plan and 5 Life Domains  Patient will attend 50% of groups offered on the unit  Patient will complete a goal card weekly      Outcome: Progressing     Problem: Ineffective Coping  Goal: Participates in unit activities  Description  Interventions:  - Provide therapeutic environment   - Provide required programming   - Redirect inappropriate behaviors   Outcome: Progressing  Goal: Patient/Family verbalizes awareness of resources  Outcome: Progressing  Goal: Understands least restrictive measures  Description  Interventions:  - Utilize least restrictive behavior  Outcome: Progressing     Problem: Risk for Self Injury/Neglect  Goal: Treatment Goal: Remain safe during length of stay, learn and adopt new coping skills, and be free of self-injurious ideation, impulses and acts at the time of discharge  Outcome: Progressing  Goal: Verbalize thoughts and feelings  Description  Interventions:  - Assess and re-assess patient's lethality and potential for self-injury  - Engage patient in 1:1 interactions, daily, for a minimum of 15 minutes  - Encourage patient to express feelings, fears, frustrations, hopes  - Establish rapport/trust with patient   Outcome: Progressing  Goal: Refrain from harming self  Description  Interventions:  - Monitor patient closely, per order  - Develop a trusting relationship  - Supervise medication ingestion, monitor effects and side effects   Outcome: Progressing  Goal: Attend and participate in unit activities, including therapeutic, recreational, and educational groups  Description  Interventions:  - Provide therapeutic and educational activities daily, encourage attendance and participation, and document same in the medical record  - Obtain collateral information, encourage visitation and family involvement in care   Outcome: Progressing     Problem: Depression  Goal: Treatment Goal: Demonstrate behavioral control of depressive symptoms, verbalize feelings of improved mood/affect, and adopt new coping skills prior to discharge  Outcome: Progressing  Goal: Verbalize thoughts and feelings  Description  Interventions:  - Assess and re-assess patient's level of risk   - Engage patient in 1:1 interactions, daily, for a minimum of 15 minutes   - Encourage patient to express feelings, fears, frustrations, hopes   Outcome: Progressing     Problem: Anxiety  Goal: Anxiety is at manageable level  Description  Interventions:  - Assess and monitor patient's anxiety level  - Monitor for signs and symptoms of anxiety both physical and emotional (heart palpitations, chest pain, shortness of breath, headaches, nausea, feeling jumpy, restlessness, irritable, apprehensive)  - Collaborate with interdisciplinary team and initiate plan and interventions as ordered  - [place] patient to unit/surroundings  - Explain treatment plan  - Encourage participation in care  - Encourage verbalization of concerns/fears  - Identify coping mechanisms  - Assist in developing anxiety-reducing skills  - Administer/offer alternative therapies  - Limit or eliminate stimulants  Outcome: Progressing     Problem: Alteration in Orientation  Goal: Interact with staff daily  Description  Interventions:  - Assess and re-assess patient's level of orientation  - Engage patient in 1 on 1 interactions, daily, for a minimum of 15 minutes   - Establish rapport/trust with patient   Outcome: Progressing     Problem: PAIN - ADULT  Goal: Verbalizes/displays adequate comfort level or baseline comfort level  Description  Interventions:  - Encourage patient to monitor pain and request assistance  - Assess pain using appropriate pain scale  - Administer analgesics based on type and severity of pain and evaluate response  - Implement non-pharmacological measures as appropriate and evaluate response  - Consider cultural and social influences on pain and pain management  - Notify physician/advanced practitioner if interventions unsuccessful or patient reports new pain  Outcome: Progressing     Problem: Nutrition/Hydration-ADULT  Goal: Nutrient/Hydration intake appropriate for improving, restoring or maintaining nutritional needs  Description  Monitor and assess patient's nutrition/hydration status for malnutrition  Collaborate with interdisciplinary team and initiate plan and interventions as ordered    Monitor patient's weight and dietary intake as ordered or per policy  Utilize nutrition screening tool and intervene as necessary  Determine patient's food preferences and provide high-protein, high-caloric foods as appropriate  INTERVENTIONS:  - Monitor oral intake, urinary output, labs, and treatment plans  - Assess nutrition and hydration status and recommend course of action  - Evaluate amount of meals eaten  - Assist patient with eating if necessary   - Allow adequate time for meals  - Recommend/ encourage appropriate diets, oral nutritional supplements, and vitamin/mineral supplements  - Order, calculate, and assess calorie counts as needed  - Recommend, monitor, and adjust tube feedings and TPN/PPN based on assessed needs  - Assess need for intravenous fluids  - Provide specific nutrition/hydration education as appropriate  - Include patient/family/caregiver in decisions related to nutrition  Outcome: Progressing     Problem: Depression  Goal: Refrain from isolation  Description  Interventions:  - Develop a trusting relationship   - Encourage socialization   Outcome: Not Progressing   Mostly isolative to her room, sleeping in her bed  Came out for meds and meals, and attended community meeting  Ate 100% of breakfast and 10% of lunch  Offered no complaints, no other behaviors or issues noted  Continue to monitor

## 2020-03-21 NOTE — PROGRESS NOTES
2135 Maggie used the [employer], but, w/some effort tonight & less consistent volumes (1000ml-1250ml)  She did come out for HS snack, & up for HS medicine except the Singulair  Wearing now her QHS humidified nasal O2 @ 1L for bed

## 2020-03-22 NOTE — PROGRESS NOTES
Progress Note - Behavioral Health   Violteta Villasenor 60 y o  female MRN: [id number]  [facility] Encounter: [id number]    Patient seen, chart reviewed, discussed with staff  Nursing staff notes patient continues to be isolative to room and spent much of her day in bed yesterday  Did not attend groups but did comply with medication and meals  Patient seen in her room this morning  She was calm and cooperative on approach  Physically states that she is feeling better this morning and denies any complaints of lightheadedness  This is an improvement for her as she typically has multiple physical complaints or concerns  Reports that she slept well last night and has a good appetite  Denies any suicidal or homicidal ideation  Denies any auditory or visual hallucinations  We did speak about her discharge planning and going to the [place] which she states that she is open to      Behavior over the last 24 hours:  unchanged  Sleep: normal  Appetite: normal  Medication side effects: No  ROS: no complaints  /58 (BP Location: Right arm)   Pulse 80   Temp 98 1 °F (36 7 °C)   Resp 17   Ht 5' 4" (1 626 m)   Wt 66 1 kg (145 lb 12 8 oz)   SpO2 94%   BMI 25 03 kg/m²     Medications:   Current Facility-Administered Medications   Medication Dose Route Frequency    acetaminophen (TYLENOL) tablet 325 mg  325 mg Oral Q6H PRN    acetaminophen (TYLENOL) tablet 650 mg  650 mg Oral Q6H PRN    acetaminophen (TYLENOL) tablet 650 mg  650 mg Oral Q8H PRN    albuterol (PROVENTIL HFA,VENTOLIN HFA) inhaler 2 puff  2 puff Inhalation Q4H PRN    aluminum-magnesium hydroxide-simethicone (MYLANTA) 200-200-20 mg/5 mL oral suspension 15 mL  15 mL Oral Q4H PRN    ammonium lactate (LAC-HYDRIN) 12 % lotion 1 application  1 application Topical BID PRN    benzonatate (TESSALON PERLES) capsule 100 mg  100 mg Oral TID PRN    benztropine (COGENTIN) injection 1 mg  1 mg Intramuscular Q8H PRN    carbamide peroxide (DEBROX) 6 5 % otic solution 5 drop  5 drop Left Ear BID PRN    cloZAPine (CLOZARIL) tablet 25 mg  25 mg Oral BID    cloZAPine (CLOZARIL) tablet 50 mg  50 mg Oral BID    docusate sodium (COLACE) capsule 100 mg  100 mg Oral BID PRN    EPINEPHrine PF (ADRENALIN) 1 mg/mL injection 0 15 mg  0 15 mg Intramuscular Once PRN    fluticasone-vilanterol (BREO ELLIPTA) 200-25 MCG/INH inhaler 1 puff  1 puff Inhalation Daily    ketotifen (ZADITOR) 0 025 % ophthalmic solution 1 drop  1 drop Right Eye BID PRN    levothyroxine tablet 125 mcg  125 mcg Oral Early Morning    magnesium hydroxide (MILK OF MAGNESIA) 400 mg/5 mL oral suspension 30 mL  30 mL Oral Daily PRN    montelukast (SINGULAIR) tablet 10 mg  10 mg Oral HS    OLANZapine (ZyPREXA) IM injection 5 mg  5 mg Intramuscular Q8H PRN    OLANZapine (ZyPREXA) tablet 5 mg  5 mg Oral Q8H PRN    ondansetron (ZOFRAN-ODT) dispersible tablet 4 mg  4 mg Oral Q6H PRN    pantoprazole (PROTONIX) EC tablet 40 mg  40 mg Oral Early Morning    polyethylene glycol (MIRALAX) packet 17 g  17 g Oral Daily PRN    polyvinyl alcohol (LIQUIFILM TEARS) 1 4 % ophthalmic solution 1 drop  1 drop Both Eyes Q3H PRN    sertraline (ZOLOFT) tablet 175 mg  175 mg Oral Daily    sucralfate (CARAFATE) oral suspension 1,000 mg  1,000 mg Oral BID    theophylline (JEF-24) 24 hr capsule 200 mg  200 mg Oral Daily    tiotropium (SPIRIVA) capsule for inhaler 18 mcg  18 mcg Inhalation Daily    traZODone (DESYREL) tablet 25 mg  25 mg Oral HS PRN       Labs:   No results displayed because visit has over 200 results            Mental Status Evaluation:  Appearance:  age appropriate and casually dressed   Behavior:  Calm, cooperative, good eye contact   Speech:  normal pitch and normal volume   Mood:  anxious   Affect:  mood-congruent   Thought Process:  concrete and goal directed   Thought Content:  Less somatically preoccupied   Perceptual Disturbances: None   Risk Potential: none   Sensorium:  person, place and time/date   Cognition:  grossly intact   Consciousness:  alert and awake    Attention: attention span appeared shorter than expected for age   Insight:  limited   Judgment: fair   Gait/Station: Not observed, patient lying in bed   Motor Activity: no abnormal movements     Progress Toward Goals: minimal change    Assessment/Plan   Principal Problem:    Schizoaffective disorder, bipolar type (HCC)  Active Problems:    COPD with asthma ([facility] [address] )    Acquired hypothyroidism    Gastroesophageal reflux disease without esophagitis    At risk for aspiration      Recommended Treatment:  Continue medications and treatment plan per Dr Willard Franco 75 mg b i d   Zoloft 105 mg daily     Continue with group therapy, milieu therapy and occupational therapy  Risks, benefits and possible side effects of Medications:   Risks, benefits, and possible side effects of medications explained to patient and patient verbalizes understanding  Counseling / Coordination of Care  Total floor / unit time spent today 25 minutes  Greater than 50% of total time was spent with the patient and / or family counseling and / or coordination of care   A description of the counseling / coordination of care:  Medication management, chart review, patient interview

## 2020-03-22 NOTE — PLAN OF CARE
Problem: Alteration in Thoughts and Perception  Goal: Treatment Goal: Gain control of psychotic behaviors/thinking, reduce/eliminate presenting symptoms and demonstrate improved reality functioning upon discharge  Outcome: Progressing  Goal: Verbalize thoughts and feelings  Description  Interventions:  - Promote a nonjudgmental and trusting relationship with the patient through active listening and therapeutic communication  - Assess patient's level of functioning, behavior and potential for risk  - Engage patient in 1 on 1 interactions for a minimum of 15 minutes each session  - Encourage patient to express fears, feelings, frustrations, and discuss symptoms    - [place] patient to reality, help patient recognize reality-based thinking   - Administer medications as ordered and assess for potential side effects  - Provide the patient education related to the signs and symptoms of the illness and desired effects of prescribed medications  Outcome: Progressing  Goal: Agree to be compliant with medication regime, as prescribed and report medication side effects  Description  Interventions:  - Offer appropriate PRN medication and supervise ingestion; conduct aims, as needed   Outcome: Progressing  Goal: Attend and participate in unit activities, including therapeutic, recreational, and educational groups  Description  Interventions:  - Provide therapeutic and educational activities daily, encourage attendance and participation, and document same in the medical record     CERTIFIED PEER SPECIALIST INTERVENTIONS:    Complete peer assessment with patient to assess their needs and identify their goals to complete while in the recovery program as well as once discharged into the community  Patient will complete WRAP Plan, Crisis Plan and 5 Life Domains  Patient will attend 50% of groups offered on the unit  Patient will complete a goal card weekly      Outcome: Progressing     Problem: Ineffective Coping  Goal: Participates in unit activities  Description  Interventions:  - Provide therapeutic environment   - Provide required programming   - Redirect inappropriate behaviors   Outcome: Progressing  Goal: Patient/Family verbalizes awareness of resources  Outcome: Progressing  Goal: Understands least restrictive measures  Description  Interventions:  - Utilize least restrictive behavior  Outcome: Progressing     Problem: Risk for Self Injury/Neglect  Goal: Treatment Goal: Remain safe during length of stay, learn and adopt new coping skills, and be free of self-injurious ideation, impulses and acts at the time of discharge  Outcome: Progressing  Goal: Verbalize thoughts and feelings  Description  Interventions:  - Assess and re-assess patient's lethality and potential for self-injury  - Engage patient in 1:1 interactions, daily, for a minimum of 15 minutes  - Encourage patient to express feelings, fears, frustrations, hopes  - Establish rapport/trust with patient   Outcome: Progressing  Goal: Refrain from harming self  Description  Interventions:  - Monitor patient closely, per order  - Develop a trusting relationship  - Supervise medication ingestion, monitor effects and side effects   Outcome: Progressing  Goal: Attend and participate in unit activities, including therapeutic, recreational, and educational groups  Description  Interventions:  - Provide therapeutic and educational activities daily, encourage attendance and participation, and document same in the medical record  - Obtain collateral information, encourage visitation and family involvement in care   Outcome: Progressing     Problem: Depression  Goal: Treatment Goal: Demonstrate behavioral control of depressive symptoms, verbalize feelings of improved mood/affect, and adopt new coping skills prior to discharge  Outcome: Progressing  Goal: Verbalize thoughts and feelings  Description  Interventions:  - Assess and re-assess patient's level of risk   - Engage patient in 1:1 interactions, daily, for a minimum of 15 minutes   - Encourage patient to express feelings, fears, frustrations, hopes   Outcome: Progressing  Goal: Refrain from isolation  Description  Interventions:  - Develop a trusting relationship   - Encourage socialization   Outcome: Progressing     Problem: Anxiety  Goal: Anxiety is at manageable level  Description  Interventions:  - Assess and monitor patient's anxiety level  - Monitor for signs and symptoms of anxiety both physical and emotional (heart palpitations, chest pain, shortness of breath, headaches, nausea, feeling jumpy, restlessness, irritable, apprehensive)  - Collaborate with interdisciplinary team and initiate plan and interventions as ordered    - [place] patient to unit/surroundings  - Explain treatment plan  - Encourage participation in care  - Encourage verbalization of concerns/fears  - Identify coping mechanisms  - Assist in developing anxiety-reducing skills  - Administer/offer alternative therapies  - Limit or eliminate stimulants  Outcome: Progressing     Problem: Alteration in Orientation  Goal: Interact with staff daily  Description  Interventions:  - Assess and re-assess patient's level of orientation  - Engage patient in 1 on 1 interactions, daily, for a minimum of 15 minutes   - Establish rapport/trust with patient   Outcome: Progressing     Problem: PAIN - ADULT  Goal: Verbalizes/displays adequate comfort level or baseline comfort level  Description  Interventions:  - Encourage patient to monitor pain and request assistance  - Assess pain using appropriate pain scale  - Administer analgesics based on type and severity of pain and evaluate response  - Implement non-pharmacological measures as appropriate and evaluate response  - Consider cultural and social influences on pain and pain management  - Notify physician/advanced practitioner if interventions unsuccessful or patient reports new pain  Outcome: Progressing     Problem: SAFETY ADULT  Goal: Patient will remain free of falls  Description  INTERVENTIONS:  - Assess patient frequently for physical needs  -  Identify cognitive and physical deficits and behaviors that affect risk of falls  -  [place] fall precautions as indicated by assessment   - Educate patient/family on patient safety including physical limitations  - Instruct patient to call for assistance with activity based on assessment  - Modify environment to reduce risk of injury  - Consider OT/PT consult to assist with strengthening/mobility  Outcome: Progressing     Problem: Alteration in Thoughts and Perception  Goal: Complete daily ADLs, including personal hygiene independently, as able  Description  Interventions:  - Observe, teach, and assist patient with ADLS  - Monitor and promote a balance of rest/activity, with adequate nutrition and elimination   Outcome: Not Progressing     Problem: Risk for Self Injury/Neglect  Goal: Complete daily ADLs, including personal hygiene independently, as able  Description  Interventions:  - Observe, teach, and assist patient with ADLS  - Monitor and promote a balance of rest/activity, with adequate nutrition and elimination  Outcome: Not Progressing     Problem: Depression  Goal: Refrain from self-neglect  Outcome: Not Progressing     Problem: Nutrition/Hydration-ADULT  Goal: Nutrient/Hydration intake appropriate for improving, restoring or maintaining nutritional needs  Description  Monitor and assess patient's nutrition/hydration status for malnutrition  Collaborate with interdisciplinary team and initiate plan and interventions as ordered  Monitor patient's weight and dietary intake as ordered or per policy  Utilize nutrition screening tool and intervene as necessary  Determine patient's food preferences and provide high-protein, high-caloric foods as appropriate       INTERVENTIONS:  - Monitor oral intake, urinary output, labs, and treatment plans  - Assess nutrition and hydration status and recommend course of action  - Evaluate amount of meals eaten  - Assist patient with eating if necessary   - Allow adequate time for meals  - Recommend/ encourage appropriate diets, oral nutritional supplements, and vitamin/mineral supplements  - Order, calculate, and assess calorie counts as needed  - Recommend, monitor, and adjust tube feedings and TPN/PPN based on assessed needs  - Assess need for intravenous fluids  - Provide specific nutrition/hydration education as appropriate  - Include patient/family/caregiver in decisions related to nutrition  Outcome: Not Progressing   Mostly isolative to her room, sleeping in her bed but came out for meds and meals, and attended spirituality group  Did not shower or do hygiene and appears disheveled  Only ate 25% of both her meals  Offered no complaints, no somatic complaints voiced, no other behaviors or issues noted  Continue to monitor

## 2020-03-22 NOTE — PLAN OF CARE
Problem: Alteration in Thoughts and Perception  Goal: Verbalize thoughts and feelings  Description  Interventions:  - Promote a nonjudgmental and trusting relationship with the patient through active listening and therapeutic communication  - Assess patient's level of functioning, behavior and potential for risk  - Engage patient in 1 on 1 interactions for a minimum of 15 minutes each session  - Encourage patient to express fears, feelings, frustrations, and discuss symptoms    - [place] patient to reality, help patient recognize reality-based thinking   - Administer medications as ordered and assess for potential side effects  - Provide the patient education related to the signs and symptoms of the illness and desired effects of prescribed medications  Outcome: Progressing  Goal: Attend and participate in unit activities, including therapeutic, recreational, and educational groups  Description  Interventions:  - Provide therapeutic and educational activities daily, encourage attendance and participation, and document same in the medical record     CERTIFIED PEER SPECIALIST INTERVENTIONS:    Complete peer assessment with patient to assess their needs and identify their goals to complete while in the recovery program as well as once discharged into the community  Patient will complete WRAP Plan, Crisis Plan and 5 Life Domains  Patient will attend 50% of groups offered on the unit  Patient will complete a goal card weekly      Outcome: Progressing     Problem: Alteration in Orientation  Goal: Interact with staff daily  Description  Interventions:  - Assess and re-assess patient's level of orientation  - Engage patient in 1 on 1 interactions, daily, for a minimum of 15 minutes   - Establish rapport/trust with patient   Outcome: Progressing     Problem: Alteration in Thoughts and Perception  Goal: Complete daily ADLs, including personal hygiene independently, as able  Description  Interventions:  - Observe, teach, and assist patient with ADLS  - Monitor and promote a balance of rest/activity, with adequate nutrition and elimination   Outcome: Not Progressing     Problem: Depression  Goal: Refrain from isolation  Description  Interventions:  - Develop a trusting relationship   - Encourage socialization   Outcome: Not Progressing  Goal: Refrain from self-neglect  Outcome: Not Bailey Cortez was awake and sitting on her bed at the start of the shift  Incentive spirometer was used with encouragement of staff  She did get to 1250 ml's  No respiratory distress noted  Came for Carafate without prompting  Likes to sit by the phone at times and yell to staff  Did make a phone call  Took medication without difficulty  Ate 75% of her meal and drank Ensure  No shower or BM  Did not attend evening group  Took HS medication  Ate snack  Oxygen saturation 93% on room air prior to oxygen 1 liter nasal cannula applied  Continue to monitor  Precautions maintained

## 2020-03-23 NOTE — PLAN OF CARE
Problem: Alteration in Thoughts and Perception  Goal: Attend and participate in unit activities, including therapeutic, recreational, and educational groups  Description  Interventions:  - Provide therapeutic and educational activities daily, encourage attendance and participation, and document same in the medical record     CERTIFIED PEER SPECIALIST INTERVENTIONS:    Complete peer assessment with patient to assess their needs and identify their goals to complete while in the recovery program as well as once discharged into the community  Patient will complete WRAP Plan, Crisis Plan and 5 Life Domains  Patient will attend 50% of groups offered on the unit  Patient will complete a goal card weekly  Outcome: Progressing     Problem: Ineffective Coping  Goal: Identifies healthy coping skills  Outcome: Progressing  Goal: Participates in unit activities  Description  Interventions:  - Provide therapeutic environment   - Provide required programming   - Redirect inappropriate behaviors   Outcome: Progressing  Patient completed Goal Card for the week of 3/23/20  Goals:  Showering             Drink more water              Attend more groups

## 2020-03-23 NOTE — PROGRESS NOTES
03/23/20 0900   Team Meeting   Meeting Type Daily Rounds   Team Members Present   Team Members Present Physician;Nurse;; Other (Discipline and Name)   Physician Team Member Dr Abad Julio Team Member Priyank Perez RN   Care Management Team Member Brenda Posadas   Other (Discipline and Name) Khai Batista LCSW; Loan Camilo, SHANIKA     No behavioral changes   Slept

## 2020-03-23 NOTE — PROGRESS NOTES
Natalie Lopes has been asleep since the beginning of the shift  Respirations easy and non labored  Oxygen (humidified) 1 liter via nasal cannula while sleeping  Compliant with medication  Continue to monitor  Precautions maintained

## 2020-03-23 NOTE — ASSESSMENT & PLAN NOTE
Patient is reporting no new complaints or problems  She is still staying to herself and at times with multiple psychosomatic symptoms as usual but her pulse ox has been fairly stable over 90 %  She has not voiced any overt delusions other than some suspiciousness about staff tampering with her medications or oxygen concentrator all the inhalant he which is a fixated paranoia  She take showers and attend to her ADLs only twice a week as per behavior plan but admits to not taking a shower yesterday claiming that she thought there was ear wax in her left ear canal   She is now willing to reconsider working with the [facility] as that is the only place for her discharge at this time  Eats well and sleeps fairly well  No side effects reported  Review of systems is negative except for some psychosomatic symptoms of fear of dying from shortness of breath or choking on food or heart attack with no physical basis    She states she will try to get a shower today I   Current medications:    Current Facility-Administered Medications:     acetaminophen (TYLENOL) tablet 325 mg, 325 mg, Oral, Q6H PRN, Jennifer Taveras MD    acetaminophen (TYLENOL) tablet 650 mg, 650 mg, Oral, Q6H PRN, Jennifer Taveras MD    acetaminophen (TYLENOL) tablet 650 mg, 650 mg, Oral, Q8H PRN, Jennifer Taveras MD    albuterol (PROVENTIL HFA,VENTOLIN HFA) inhaler 2 puff, 2 puff, Inhalation, Q4H PRN, Jennifer Taveras MD, 2 puff at 10/11/19 0424    aluminum-magnesium hydroxide-simethicone (MYLANTA) 200-200-20 mg/5 mL oral suspension 15 mL, 15 mL, Oral, Q4H PRN, Jennifer Taveras MD, 15 mL at 01/26/20 1021    ammonium lactate (LAC-HYDRIN) 12 % lotion 1 application, 1 application, Topical, BID PRN, Jennifer Taveras MD    benzonatate (TESSALON PERLES) capsule 100 mg, 100 mg, Oral, TID PRN, Jennifer Taveras MD    benztropine (COGENTIN) injection 1 mg, 1 mg, Intramuscular, Q8H PRN, Jennifer Taveras MD    carbamide peroxide (DEBROX) 6 5 % otic solution 5 drop, 5 drop, Left Ear, BID PRN, Jd Urbina MD    cloZAPine (CLOZARIL) tablet 25 mg, 25 mg, Oral, BID, Meldon Curling, MD, 25 mg at 03/22/20 2044    cloZAPine (CLOZARIL) tablet 50 mg, 50 mg, Oral, BID, Meldon Curling, MD, 50 mg at 03/22/20 2044    docusate sodium (COLACE) capsule 100 mg, 100 mg, Oral, BID PRN, Meldon Curling, MD    EPINEPHrine PF (ADRENALIN) 1 mg/mL injection 0 15 mg, 0 15 mg, Intramuscular, Once PRN, Meldon Curling, MD    fluticasone-vilanterol (BREO ELLIPTA) 200-25 MCG/INH inhaler 1 puff, 1 puff, Inhalation, Daily, Meldon Curling, MD, 1 puff at 03/22/20 0849    ketotifen (ZADITOR) 0 025 % ophthalmic solution 1 drop, 1 drop, Right Eye, BID PRN, Meldon Curling, MD    levothyroxine tablet 125 mcg, 125 mcg, Oral, Early Morning, Meldon Curling, MD, 125 mcg at 03/23/20 0620    magnesium hydroxide (MILK OF MAGNESIA) 400 mg/5 mL oral suspension 30 mL, 30 mL, Oral, Daily PRN, Meldon Curling, MD    montelukast (SINGULAIR) tablet 10 mg, 10 mg, Oral, HS, Meldon Curling, MD, 10 mg at 02/22/20 2104    OLANZapine (ZyPREXA) IM injection 5 mg, 5 mg, Intramuscular, Q8H PRN, Meldon Curling, MD    OLANZapine (ZyPREXA) tablet 5 mg, 5 mg, Oral, Q8H PRN, Meldon Curling, MD    ondansetron (ZOFRAN-ODT) dispersible tablet 4 mg, 4 mg, Oral, Q6H PRN, Meldon Curling, MD, 4 mg at 12/09/19 1757    pantoprazole (PROTONIX) EC tablet 40 mg, 40 mg, Oral, Early Morning, Meldon Curling, MD, 40 mg at 03/23/20 0620    polyethylene glycol (MIRALAX) packet 17 g, 17 g, Oral, Daily PRN, Meldon Curling, MD    polyvinyl alcohol (LIQUIFILM TEARS) 1 4 % ophthalmic solution 1 drop, 1 drop, Both Eyes, Q3H PRN, Meldon Curling, MD    sertraline (ZOLOFT) tablet 175 mg, 175 mg, Oral, Daily, Meldon Curling, MD, 175 mg at 03/22/20 0849    sucralfate (CARAFATE) oral suspension 1,000 mg, 1,000 mg, Oral, BID, Cat Torres MD, 1,000 mg at 03/23/20 4419    theophylline (JEF-24) 24 hr capsule 200 mg, 200 mg, Oral, Daily, Meldon Curling, MD, 200 mg at 03/22/20 0849    tiotropium [facility]) capsule for inhaler 18 mcg, 18 mcg, Inhalation, Daily, Meredith Wesley MD, 18 mcg at 03/22/20 0849    traZODone (DESYREL) tablet 25 mg, 25 mg, Oral, HS PRN, Meredith Wesley MD  Justification if on more than two antipsychotics: not applicable  Side effects if any: none    Risks , benefits, side effects and precautions of medications discussed with patient and reminded patient to let us know any problems with medications     Objective:     Vital Signs:  Vitals:    03/22/20 0900 03/22/20 1600 03/22/20 2000 03/22/20 2100   BP: 105/58 99/66 90/60    BP Location: Right arm Left arm Left arm    Pulse: 80 78 84    Resp: 17 16 16    Temp: 98 1 °F (36 7 °C) (!) 97 °F (36 1 °C) (!) 97 4 °F (36 3 °C)    TempSrc:  Temporal Temporal    SpO2: 94% 93% 94% 93%   Weight: 66 1 kg (145 lb 12 8 oz)      Height:         Appearance:  age appropriate and older than stated age poorly groomed with uncombed hair found sitting on bed   Behavior:  deviant, evasive and guarded argumentative about the room assigned to her at the [facility] personal care home   Speech:  delayed, increased latency of response and tangential    Mood:  anxious, euphoric and irritable    Affect:  increased in intensity, increased in range, mood-congruent and redirectable   Thought Process:  circumstantial, concrete, goal directed, illogical and perserverative tendency to have stilted speech   Thought Content:  delusions  grandiose and persecutory no current suicidal homicidal thoughts and under plans reported  No overt delusions elicited other than some paranoia about staff tampering with her medications as well as oxygen concentrator and her inhalant off and on  No preoccupation with violence or suicide    No phobias obsessions or compulsions but still with psychosomatic symptoms of fear of dying from multiple somatic complaints   Perceptual Disturbances: None    Risk Potential: Inability to care for herself and refusal to take showers regularly   Sensorium:  person, place, time/date, situation, day of week, month of year, year and time   Cognition:  grossly intact no deficit in language, no deficit in recent or remote memory, aware of current events  Consciousness:  alert and awake    Attention: Fair   Intellect: Estimated to be at least average   Insight:  Limited   Judgment: fair       Motor Activity: no abnormal movements         Recent Labs:  Results Reviewed     None          I/O Past 24 hours:  No intake/output data recorded  No intake/output data recorded  Assessment / Plan:     Schizoaffective disorder, bipolar type ([address] )  Overall status:  improving    Reason for continued inpatient care: to assess ability to maintain improvement by attending to ADLs and taking showers regular and see how she does on outing with family after another outing with staff escort next week  Acceptance by patient: accepting now  Hopefulness in recovery: living at the [facility] soon  Understanding of medications :  yes  Involved in reintegration process: attending groups and has off grounds and off unit privileges   Trusting in relatoinship with psychiatrist:  Santos Fry now  Overall status :  No changes  Recommended Treatment:    Medication changes:  1) none today    Non-pharmacological treatments  1) Continue with individual therapy, group therapy, milieu therapy and occupational therapy using recovery principles and psycho-education about accepting illness and need for treatment   2) encourage to take showers twice a week and cooperate with treatment and adl skills as per her behav  plan  3) another therapeutic pass with sister now that she did well  with the assigned staff escort to the park but it is now on hold due to corana virus  4) Prepare for the [facility] and encouraged to accept  rather than refuse it  Safety  1) Safety/communication plan established targeting dynamic risk factors above    Discharge Plan back to a [facility] at [address] / Coordination of Care    Total floor / unit time spent today 15 minutes  Greater than 50% of total time was spent with the patient and / or family counseling and / or coordination of care  Receptive to listening and learning coping skills for management of symptoms and attending to ADLs  Patient's Rights, confidentiality and exceptions to confidentiality, use of automated medical record, Jeb Patrick staff access to medical record, and consent to treatment reviewed      Shaggy Rangel MD

## 2020-03-23 NOTE — TREATMENT TEAM
03/23/20 1100   Activity/Group Checklist   Group   (IMR/Self Care)   Attendance Attended   Attendance Duration (min) 46-60   Interactions Interacted appropriately   Affect/Mood Appropriate;Bright;Calm;Normal range   Goals Achieved Identified feelings; Discussed coping strategies; Identified resources and support systems; Able to listen to others; Able to engage in interactions; Able to manage/cope with feelings

## 2020-03-23 NOTE — PLAN OF CARE
Problem: Alteration in Thoughts and Perception  Goal: Treatment Goal: Gain control of psychotic behaviors/thinking, reduce/eliminate presenting symptoms and demonstrate improved reality functioning upon discharge  Outcome: Progressing  Goal: Verbalize thoughts and feelings  Description  Interventions:  - Promote a nonjudgmental and trusting relationship with the patient through active listening and therapeutic communication  - Assess patient's level of functioning, behavior and potential for risk  - Engage patient in 1 on 1 interactions for a minimum of 15 minutes each session  - Encourage patient to express fears, feelings, frustrations, and discuss symptoms    - [place] patient to reality, help patient recognize reality-based thinking   - Administer medications as ordered and assess for potential side effects  - Provide the patient education related to the signs and symptoms of the illness and desired effects of prescribed medications  Outcome: Progressing  Goal: Agree to be compliant with medication regime, as prescribed and report medication side effects  Description  Interventions:  - Offer appropriate PRN medication and supervise ingestion; conduct aims, as needed   Outcome: Progressing  Goal: Attend and participate in unit activities, including therapeutic, recreational, and educational groups  Description  Interventions:  - Provide therapeutic and educational activities daily, encourage attendance and participation, and document same in the medical record     CERTIFIED PEER SPECIALIST INTERVENTIONS:    Complete peer assessment with patient to assess their needs and identify their goals to complete while in the recovery program as well as once discharged into the community  Patient will complete WRAP Plan, Crisis Plan and 5 Life Domains  Patient will attend 50% of groups offered on the unit  Patient will complete a goal card weekly      Outcome: Progressing  Goal: Recognize dysfunctional thoughts, communicate reality-based thoughts at the time of discharge  Description  Interventions:  - Provide medication and psycho-education to assist patient in compliance and developing insight into his/her illness   Outcome: Progressing     Problem: Ineffective Coping  Goal: Participates in unit activities  Description  Interventions:  - Provide therapeutic environment   - Provide required programming   - Redirect inappropriate behaviors   Outcome: Progressing  Goal: Patient/Family verbalizes awareness of resources  Outcome: Progressing  Goal: Understands least restrictive measures  Description  Interventions:  - Utilize least restrictive behavior  Outcome: Progressing     Problem: Risk for Self Injury/Neglect  Goal: Treatment Goal: Remain safe during length of stay, learn and adopt new coping skills, and be free of self-injurious ideation, impulses and acts at the time of discharge  Outcome: Progressing  Goal: Verbalize thoughts and feelings  Description  Interventions:  - Assess and re-assess patient's lethality and potential for self-injury  - Engage patient in 1:1 interactions, daily, for a minimum of 15 minutes  - Encourage patient to express feelings, fears, frustrations, hopes  - Establish rapport/trust with patient   Outcome: Progressing  Goal: Refrain from harming self  Description  Interventions:  - Monitor patient closely, per order  - Develop a trusting relationship  - Supervise medication ingestion, monitor effects and side effects   Outcome: Progressing  Goal: Attend and participate in unit activities, including therapeutic, recreational, and educational groups  Description  Interventions:  - Provide therapeutic and educational activities daily, encourage attendance and participation, and document same in the medical record  - Obtain collateral information, encourage visitation and family involvement in care   Outcome: Progressing     Problem: Depression  Goal: Verbalize thoughts and feelings  Description  Interventions:  - Assess and re-assess patient's level of risk   - Engage patient in 1:1 interactions, daily, for a minimum of 15 minutes   - Encourage patient to express feelings, fears, frustrations, hopes   Outcome: Progressing     Problem: Anxiety  Goal: Anxiety is at manageable level  Description  Interventions:  - Assess and monitor patient's anxiety level  - Monitor for signs and symptoms of anxiety both physical and emotional (heart palpitations, chest pain, shortness of breath, headaches, nausea, feeling jumpy, restlessness, irritable, apprehensive)  - Collaborate with interdisciplinary team and initiate plan and interventions as ordered    - [place] patient to unit/surroundings  - Explain treatment plan  - Encourage participation in care  - Encourage verbalization of concerns/fears  - Identify coping mechanisms  - Assist in developing anxiety-reducing skills  - Administer/offer alternative therapies  - Limit or eliminate stimulants  Outcome: Progressing     Problem: Alteration in Orientation  Goal: Interact with staff daily  Description  Interventions:  - Assess and re-assess patient's level of orientation  - Engage patient in 1 on 1 interactions, daily, for a minimum of 15 minutes   - Establish rapport/trust with patient   Outcome: Progressing     Problem: PAIN - ADULT  Goal: Verbalizes/displays adequate comfort level or baseline comfort level  Description  Interventions:  - Encourage patient to monitor pain and request assistance  - Assess pain using appropriate pain scale  - Administer analgesics based on type and severity of pain and evaluate response  - Implement non-pharmacological measures as appropriate and evaluate response  - Consider cultural and social influences on pain and pain management  - Notify physician/advanced practitioner if interventions unsuccessful or patient reports new pain  Outcome: Progressing     Problem: SAFETY ADULT  Goal: Patient will remain free of falls  Description  INTERVENTIONS:  - Assess patient frequently for physical needs  -  Identify cognitive and physical deficits and behaviors that affect risk of falls  -  [place] fall precautions as indicated by assessment   - Educate patient/family on patient safety including physical limitations  - Instruct patient to call for assistance with activity based on assessment  - Modify environment to reduce risk of injury  - Consider OT/PT consult to assist with strengthening/mobility  Outcome: Progressing     Problem: Alteration in Thoughts and Perception  Goal: Complete daily ADLs, including personal hygiene independently, as able  Description  Interventions:  - Observe, teach, and assist patient with ADLS  - Monitor and promote a balance of rest/activity, with adequate nutrition and elimination   Outcome: Not Progressing     Problem: Risk for Self Injury/Neglect  Goal: Complete daily ADLs, including personal hygiene independently, as able  Description  Interventions:  - Observe, teach, and assist patient with ADLS  - Monitor and promote a balance of rest/activity, with adequate nutrition and elimination  Outcome: Not Progressing     Problem: Depression  Goal: Treatment Goal: Demonstrate behavioral control of depressive symptoms, verbalize feelings of improved mood/affect, and adopt new coping skills prior to discharge  Outcome: Not Progressing  Goal: Refrain from isolation  Description  Interventions:  - Develop a trusting relationship   - Encourage socialization   Outcome: Not Progressing  Goal: Refrain from self-neglect  Outcome: Not Progressing     Problem: Nutrition/Hydration-ADULT  Goal: Nutrient/Hydration intake appropriate for improving, restoring or maintaining nutritional needs  Description  Monitor and assess patient's nutrition/hydration status for malnutrition  Collaborate with interdisciplinary team and initiate plan and interventions as ordered    Monitor patient's weight and dietary intake as ordered or per policy  Utilize nutrition screening tool and intervene as necessary  Determine patient's food preferences and provide high-protein, high-caloric foods as appropriate  INTERVENTIONS:  - Monitor oral intake, urinary output, labs, and treatment plans  - Assess nutrition and hydration status and recommend course of action  - Evaluate amount of meals eaten  - Assist patient with eating if necessary   - Allow adequate time for meals  - Recommend/ encourage appropriate diets, oral nutritional supplements, and vitamin/mineral supplements  - Order, calculate, and assess calorie counts as needed  - Recommend, monitor, and adjust tube feedings and TPN/PPN based on assessed needs  - Assess need for intravenous fluids  - Provide specific nutrition/hydration education as appropriate  - Include patient/family/caregiver in decisions related to nutrition  Outcome: Not Progressing   Mostly isolative to her room, sleeping in her bed but came out for meds and meals, and attended community meeting and IMR groups  Did not shower or do hygiene and appears disheveled  Only ate 25% of both her meals  Offered no complaints, no somatic complaints voiced, no other behaviors or issues noted  Continue to monitor

## 2020-03-23 NOTE — PLAN OF CARE
Problem: Alteration in Thoughts and Perception  Goal: Verbalize thoughts and feelings  Description  Interventions:  - Promote a nonjudgmental and trusting relationship with the patient through active listening and therapeutic communication  - Assess patient's level of functioning, behavior and potential for risk  - Engage patient in 1 on 1 interactions for a minimum of 15 minutes each session  - Encourage patient to express fears, feelings, frustrations, and discuss symptoms    - [place] patient to reality, help patient recognize reality-based thinking   - Administer medications as ordered and assess for potential side effects  - Provide the patient education related to the signs and symptoms of the illness and desired effects of prescribed medications  Outcome: Progressing  Goal: Agree to be compliant with medication regime, as prescribed and report medication side effects  Description  Interventions:  - Offer appropriate PRN medication and supervise ingestion; conduct aims, as needed   Outcome: Progressing  Goal: Attend and participate in unit activities, including therapeutic, recreational, and educational groups  Description  Interventions:  - Provide therapeutic and educational activities daily, encourage attendance and participation, and document same in the medical record     CERTIFIED PEER SPECIALIST INTERVENTIONS:    Complete peer assessment with patient to assess their needs and identify their goals to complete while in the recovery program as well as once discharged into the community  Patient will complete WRAP Plan, Crisis Plan and 5 Life Domains  Patient will attend 50% of groups offered on the unit  Patient will complete a goal card weekly      Outcome: Progressing  Goal: Complete daily ADLs, including personal hygiene independently, as able  Description  Interventions:  - Observe, teach, and assist patient with ADLS  - Monitor and promote a balance of rest/activity, with adequate nutrition and elimination   Outcome: Progressing     Problem: Depression  Goal: Refrain from isolation  Description  Interventions:  - Develop a trusting relationship   - Encourage socialization   Outcome: Not Progressing     Jd Waggoner was sitting in the chair by the phone at the beginning of the shift  At times will try and talk to staff that are in the nurses station  Social with staff and select peers  She then showered on her own and washed her hair  Prompted for medication  Ate 25% of her meal and drank 100% of Ensure  Later she called me to her room and stated, "I had the egg salad and the yolk tasted gritty  Do you think someone tampered with it"? Assured her that it was not tampered with by staff  "It tasted okay and I feel okay"  Back to her room in bed after eating  Attended evening group  Took HS medication  Ate snack  O2 sat 91% on room air prior to 1 liter (humidified) nasal cannula applied  Continue to monitor  Precautions maintained

## 2020-03-23 NOTE — TREATMENT TEAM
BRADY GROUP NOTE  Participation in group discussion  03/23/20 0900   Activity/Group Checklist   Group Community meeting   Attendance Attended   Attendance Duration (min) 16-30   Interactions Interacted appropriately   Affect/Mood Appropriate   Goals Achieved Able to listen to others; Able to engage in interactions   Objectives/Key Points:  Living intentionally  Goal Setting  Thought for the Day  Self Check In

## 2020-03-23 NOTE — PROGRESS NOTES
Progress Note - Annamarie Eng 1955, 60 y o  female MRN: [id number]    [facility] Encounter: [id number]    Primary Care Provider: Naila Khalil PA-C   Date and time admitted to hospital: 7/23/2019  5:30 PM        * Schizoaffective disorder, bipolar type [facility])  Assessment & Plan  Patient is reporting no new complaints or problems  She is still staying to herself and at times with multiple psychosomatic symptoms as usual but her pulse ox has been fairly stable over 90 %  She has not voiced any overt delusions other than some suspiciousness about staff tampering with her medications or oxygen concentrator all the inhalant he which is a fixated paranoia  She take showers and attend to her ADLs only twice a week as per behavior plan but admits to not taking a shower yesterday claiming that she thought there was ear wax in her left ear canal   She is now willing to reconsider working with the [facility] as that is the only place for her discharge at this time  Eats well and sleeps fairly well  No side effects reported  Review of systems is negative except for some psychosomatic symptoms of fear of dying from shortness of breath or choking on food or heart attack with no physical basis    She states she will try to get a shower today I   Current medications:    Current Facility-Administered Medications:     acetaminophen (TYLENOL) tablet 325 mg, 325 mg, Oral, Q6H PRN, Shi Pham MD    acetaminophen (TYLENOL) tablet 650 mg, 650 mg, Oral, Q6H PRN, Shi Pham MD    acetaminophen (TYLENOL) tablet 650 mg, 650 mg, Oral, Q8H PRN, Shi Pham MD    albuterol (PROVENTIL HFA,VENTOLIN HFA) inhaler 2 puff, 2 puff, Inhalation, Q4H PRN, Shi Pham MD, 2 puff at 10/11/19 0424    aluminum-magnesium hydroxide-simethicone (MYLANTA) 200-200-20 mg/5 mL oral suspension 15 mL, 15 mL, Oral, Q4H PRN, Shi Pham MD, 15 mL at 01/26/20 1021    ammonium lactate (LAC-HYDRIN) 12 % lotion 1 application, 1 application, Topical, BID PRN, Jill Gayle MD    benzonatate (TESSALON PERLES) capsule 100 mg, 100 mg, Oral, TID PRN, Jill Gayle MD    benztropine (COGENTIN) injection 1 mg, 1 mg, Intramuscular, Q8H PRN, Jill Gayle MD    carbamide peroxide (DEBROX) 6 5 % otic solution 5 drop, 5 drop, Left Ear, BID PRN, Amanda Bryan MD    cloZAPine (CLOZARIL) tablet 25 mg, 25 mg, Oral, BID, Jill Gayle MD, 25 mg at 03/22/20 2044    cloZAPine (CLOZARIL) tablet 50 mg, 50 mg, Oral, BID, Jill Gayle MD, 50 mg at 03/22/20 2044    docusate sodium (COLACE) capsule 100 mg, 100 mg, Oral, BID PRN, Jill Gayle MD    EPINEPHrine PF (ADRENALIN) 1 mg/mL injection 0 15 mg, 0 15 mg, Intramuscular, Once PRN, Jill Gayle MD    fluticasone-vilanterol (BREO ELLIPTA) 200-25 MCG/INH inhaler 1 puff, 1 puff, Inhalation, Daily, Jill Gayle MD, 1 puff at 03/22/20 0849    ketotifen (ZADITOR) 0 025 % ophthalmic solution 1 drop, 1 drop, Right Eye, BID PRN, Jill Gayle MD    levothyroxine tablet 125 mcg, 125 mcg, Oral, Early Morning, Jill Gayle MD, 125 mcg at 03/23/20 0620    magnesium hydroxide (MILK OF MAGNESIA) 400 mg/5 mL oral suspension 30 mL, 30 mL, Oral, Daily PRN, Jill Gayle MD    montelukast (SINGULAIR) tablet 10 mg, 10 mg, Oral, HS, Jill Gayle MD, 10 mg at 02/22/20 2104    OLANZapine (ZyPREXA) IM injection 5 mg, 5 mg, Intramuscular, Q8H PRN, Jill Gayle MD    OLANZapine (ZyPREXA) tablet 5 mg, 5 mg, Oral, Q8H PRN, Jill Gayle MD    ondansetron (ZOFRAN-ODT) dispersible tablet 4 mg, 4 mg, Oral, Q6H PRN, Jill Gayle MD, 4 mg at 12/09/19 1757    pantoprazole (PROTONIX) EC tablet 40 mg, 40 mg, Oral, Early Morning, Jill Gayle MD, 40 mg at 03/23/20 0620    polyethylene glycol (MIRALAX) packet 17 g, 17 g, Oral, Daily PRN, Jill Gayle MD    polyvinyl alcohol (LIQUIFILM TEARS) 1 4 % ophthalmic solution 1 drop, 1 drop, Both Eyes, Q3H PRN, Jill Gayle MD    sertraline (ZOLOFT) tablet 175 mg, 175 mg, Oral, Daily, Faheem Daniels MD, 175 mg at 03/22/20 0849    sucralfate (CARAFATE) oral suspension 1,000 mg, 1,000 mg, Oral, BID, Jacob Mario MD, 1,000 mg at 03/23/20 5541    theophylline (JEF-24) 24 hr capsule 200 mg, 200 mg, Oral, Daily, Faheem Daniels MD, 200 mg at 03/22/20 0849    tiotropium [facility]) capsule for inhaler 18 mcg, 18 mcg, Inhalation, Daily, Faheem Daniels MD, 18 mcg at 03/22/20 0849    traZODone (DESYREL) tablet 25 mg, 25 mg, Oral, HS PRN, Faheem Daniels MD  Justification if on more than two antipsychotics: not applicable  Side effects if any: none    Risks , benefits, side effects and precautions of medications discussed with patient and reminded patient to let us know any problems with medications     Objective:     Vital Signs:  Vitals:    03/22/20 0900 03/22/20 1600 03/22/20 2000 03/22/20 2100   BP: 105/58 99/66 90/60    BP Location: Right arm Left arm Left arm    Pulse: 80 78 84    Resp: 17 16 16    Temp: 98 1 °F (36 7 °C) (!) 97 °F (36 1 °C) (!) 97 4 °F (36 3 °C)    TempSrc:  Temporal Temporal    SpO2: 94% 93% 94% 93%   Weight: 66 1 kg (145 lb 12 8 oz)      Height:         Appearance:  age appropriate and older than stated age poorly groomed with uncombed hair found sitting on bed   Behavior:  deviant, evasive and guarded argumentative [facility]   Speech:  delayed, increased latency of response and tangential    Mood:  anxious, euphoric and irritable    Affect:  increased in intensity, increased in range, mood-congruent and redirectable   Thought Process:  circumstantial, concrete, goal directed, illogical and perserverative tendency to have stilted speech   Thought Content:  delusions  grandiose and persecutory no current suicidal homicidal thoughts and under plans reported  No overt delusions elicited other than some paranoia about staff tampering with her medications as well as oxygen concentrator and her inhalant off and on    No preoccupation with violence or suicide  No phobias obsessions or compulsions but still with psychosomatic symptoms of fear of dying from multiple somatic complaints   Perceptual Disturbances: None    Risk Potential: Inability to care for herself and refusal to take showers regularly   Sensorium:  person, place, time/date, situation, day of week, month of year, year and time   Cognition:  grossly intact no deficit in language, no deficit in recent or remote memory, aware of current events  Consciousness:  alert and awake    Attention: Fair   Intellect: Estimated to be at least average   Insight:  Limited   Judgment: fair       Motor Activity: no abnormal movements         Recent Labs:  Results Reviewed     None          I/O Past 24 hours:  No intake/output data recorded  No intake/output data recorded  Assessment / Plan:     Schizoaffective disorder, bipolar type ([facility] [address] )  Overall status:  improving    Reason for continued inpatient care: to assess ability to maintain improvement by attending to ADLs and taking showers regular and see how she does on outing with family after another outing with staff escort next week  Acceptance by patient: accepting now  Hopefulness in recovery: living at the [facility]  Understanding of medications :  yes  Involved in reintegration process: attending groups and has off grounds and off unit privileges   Trusting in relatoinship with psychiatrist:  Beatriz Farrell now  Overall status :  No changes  Recommended Treatment:    Medication changes:  1) none today    Non-pharmacological treatments  1) Continue with individual therapy, group therapy, milieu therapy and occupational therapy using recovery principles and psycho-education about accepting illness and need for treatment     2) encourage to take showers twice a week and cooperate with treatment and adl skills as per her behav  plan  3) another therapeutic pass with sister now that she did well  with the assigned staff escort to the park but it is now on hold due to corana virus  4) Prepare for the [address] [place] [address] Rd and encouraged to accept  rather than refuse it  Safety  1) Safety/communication plan established targeting dynamic risk factors above  Discharge Plan back to a [facility] at [address] / Coordination of Care    Total floor / unit time spent today 15 minutes  Greater than 50% of total time was spent with the patient and / or family counseling and / or coordination of care  Receptive to listening and learning coping skills for management of symptoms and attending to ADLs  Patient's Rights, confidentiality and exceptions to confidentiality, use of automated medical record, [facility] staff access to medical record, and consent to treatment reviewed      Faheem Daniels MD

## 2020-03-24 NOTE — PLAN OF CARE
Problem: Alteration in Thoughts and Perception  Goal: Verbalize thoughts and feelings  Description  Interventions:  - Promote a nonjudgmental and trusting relationship with the patient through active listening and therapeutic communication  - Assess patient's level of functioning, behavior and potential for risk  - Engage patient in 1 on 1 interactions for a minimum of 15 minutes each session  - Encourage patient to express fears, feelings, frustrations, and discuss symptoms    - [place] patient to reality, help patient recognize reality-based thinking   - Administer medications as ordered and assess for potential side effects  - Provide the patient education related to the signs and symptoms of the illness and desired effects of prescribed medications  Outcome: Progressing  Goal: Agree to be compliant with medication regime, as prescribed and report medication side effects  Description  Interventions:  - Offer appropriate PRN medication and supervise ingestion; conduct aims, as needed   Outcome: Progressing  Goal: Attend and participate in unit activities, including therapeutic, recreational, and educational groups  Description  Interventions:  - Provide therapeutic and educational activities daily, encourage attendance and participation, and document same in the medical record     CERTIFIED PEER SPECIALIST INTERVENTIONS:    Complete peer assessment with patient to assess their needs and identify their goals to complete while in the recovery program as well as once discharged into the community  Patient will complete WRAP Plan, Crisis Plan and 5 Life Domains  Patient will attend 50% of groups offered on the unit  Patient will complete a goal card weekly      Outcome: Progressing  Goal: Complete daily ADLs, including personal hygiene independently, as able  Description  Interventions:  - Observe, teach, and assist patient with ADLS  - Monitor and promote a balance of rest/activity, with adequate nutrition and elimination   Outcome: Progressing     Problem: Depression  Goal: Verbalize thoughts and feelings  Description  Interventions:  - Assess and re-assess patient's level of risk   - Engage patient in 1:1 interactions, daily, for a minimum of 15 minutes   - Encourage patient to express feelings, fears, frustrations, hopes   Outcome: Progressing     Problem: Depression  Goal: Refrain from isolation  Description  Interventions:  - Develop a trusting relationship   - Encourage socialization   Outcome: Not Progressing     Roosevelt Lozano was in her room awake at the beginning of the shift  No somatic complaints  Pleasant and cooperative upon approach  Smiling while conversing  Stated that she was "okay"  VSS  No shower this evening  Ate 50% of her meal and drank Ensure    Social with select peers while out for her meal  After eating she went back to her room  Continue to monitor  Precautions maintained

## 2020-03-24 NOTE — PROGRESS NOTES
Progress Note - Selina Simon 1955, 60 y o  female MRN: [id number]    Unit/Bed#: [facility] 110-02 Encounter: [id number]    Primary Care Provider: Judith Morrissey PA-C   Date and time admitted to hospital: 7/23/2019  5:30 PM        * Schizoaffective disorder, bipolar type [facility])  Assessment & Plan  Patient finally took a shower yesterday on her own but was suspicious about the food that she ate as she thought someone might up tampered with that as well  She is still questions her oxygen concentrator and some of her medications as well as inhalant because of the suspicion that staff may be tampering with them as well off and on but is usually redirected  Her hygiene is good and she is well kept and well groomed today and hair is combed  She is now reporting to me that she is willing to work with the [facility] rather than dismiss it completely  She is eating and sleeping well and she has about 145 lb with 5 ft 4 in height witches ideal for her weight  She is still psychosomatic and is fearful about dying from choking on food or shortness of breath or from heart attack etc and now she is worried about the corona virus as well  Again demanding an echo and ekg etc! She is no longer complaining about year wax in her left ear today  Review of systems is unremarkable otherwise  Compliant with medications eating well sleeping well reports no side effects with no palpitations or constipation or drooling of saliva     Current medications:    Current Facility-Administered Medications:     acetaminophen (TYLENOL) tablet 325 mg, 325 mg, Oral, Q6H PRN, Greer Beltran MD    acetaminophen (TYLENOL) tablet 650 mg, 650 mg, Oral, Q6H PRN, Greer Beltran MD    acetaminophen (TYLENOL) tablet 650 mg, 650 mg, Oral, Q8H PRN, Greer Beltran MD    albuterol (PROVENTIL HFA,VENTOLIN HFA) inhaler 2 puff, 2 puff, Inhalation, Q4H PRN, Greer Beltran MD, 2 puff at 10/11/19 0424    aluminum-magnesium hydroxide-simethicone (MYLANTA) 200-200-20 mg/5 mL oral suspension 15 mL, 15 mL, Oral, Q4H PRN, Sandhya Bolaños MD, 15 mL at 01/26/20 1021    ammonium lactate (LAC-HYDRIN) 12 % lotion 1 application, 1 application, Topical, BID PRN, Sandhya Bolaños MD    benzonatate (TESSALON PERLES) capsule 100 mg, 100 mg, Oral, TID PRN, Sandhya Bolaños MD    benztropine (COGENTIN) injection 1 mg, 1 mg, Intramuscular, Q8H PRN, Sandhya Bolaños MD    carbamide peroxide (DEBROX) 6 5 % otic solution 5 drop, 5 drop, Left Ear, BID PRN, Kendy Tamayo MD    cloZAPine (CLOZARIL) tablet 25 mg, 25 mg, Oral, BID, Sandhya Bolaños MD, 25 mg at 03/23/20 2051    cloZAPine (CLOZARIL) tablet 50 mg, 50 mg, Oral, BID, Sandhya Bolaños MD, 50 mg at 03/23/20 2051    docusate sodium (COLACE) capsule 100 mg, 100 mg, Oral, BID PRN, Sandhya Bolaños MD    EPINEPHrine PF (ADRENALIN) 1 mg/mL injection 0 15 mg, 0 15 mg, Intramuscular, Once PRN, Sandhya Bolaños MD    fluticasone-vilanterol (BREO ELLIPTA) 200-25 MCG/INH inhaler 1 puff, 1 puff, Inhalation, Daily, Sandhya Bolaños MD, 1 puff at 03/24/20 0826    ketotifen (ZADITOR) 0 025 % ophthalmic solution 1 drop, 1 drop, Right Eye, BID PRN, Sandhya Bolaños MD    levothyroxine tablet 125 mcg, 125 mcg, Oral, Early Morning, Sandhya Bolaños MD, 125 mcg at 03/24/20 0604    magnesium hydroxide (MILK OF MAGNESIA) 400 mg/5 mL oral suspension 30 mL, 30 mL, Oral, Daily PRN, Sandhya Bolñaos MD    montelukast (SINGULAIR) tablet 10 mg, 10 mg, Oral, HS, Sandhya Bolaños MD, 10 mg at 02/22/20 2104    OLANZapine (ZyPREXA) IM injection 5 mg, 5 mg, Intramuscular, Q8H PRN, Sandhya Bolaños MD    OLANZapine (ZyPREXA) tablet 5 mg, 5 mg, Oral, Q8H PRN, Sandhya Bolaños MD    ondansetron (ZOFRAN-ODT) dispersible tablet 4 mg, 4 mg, Oral, Q6H PRN, Sandhya Bolaños MD, 4 mg at 12/09/19 1757    pantoprazole (PROTONIX) EC tablet 40 mg, 40 mg, Oral, Early Morning, Sandhya Bolaños MD, 40 mg at 03/24/20 0604    polyethylene glycol (MIRALAX) packet 17 g, 17 g, Oral, Daily PRN, MD Cristopher Washington Needs polyvinyl alcohol (LIQUIFILM TEARS) 1 4 % ophthalmic solution 1 drop, 1 drop, Both Eyes, Q3H PRN, Deep Durand MD    sertraline (ZOLOFT) tablet 175 mg, 175 mg, Oral, Daily, Deep Durand MD, 175 mg at 03/24/20 6565    sucralfate (CARAFATE) oral suspension 1,000 mg, 1,000 mg, Oral, BID, Rosario Jay MD, 1,000 mg at 03/24/20 0604    theophylline (JEF-24) 24 hr capsule 200 mg, 200 mg, Oral, Daily, Deep Durand MD, 200 mg at 03/24/20 1820    tiotropium [facility]) capsule for inhaler 18 mcg, 18 mcg, Inhalation, Daily, Deep Durnad MD, 18 mcg at 03/24/20 1685    traZODone (DESYREL) tablet 25 mg, 25 mg, Oral, HS PRN, Deep Durand MD  Justification if on more than two antipsychotics: not applicable  Side effects if any: none    Risks , benefits, side effects and precautions of medications discussed with patient and reminded patient to let us know any problems with medications     Objective:     Vital Signs:  Vitals:    03/23/20 1623 03/23/20 2002 03/23/20 2100 03/24/20 0700   BP: 106/73 106/65  101/63   BP Location: Left arm Left arm  Left arm   Pulse: 86 81  76   Resp: 16 18  18   Temp: (!) 97 3 °F (36 3 °C) 97 8 °F (36 6 °C)  98 3 °F (36 8 °C)   TempSrc: Temporal Temporal  Temporal   SpO2: 94% 94% 91%    Weight:       Height:         Appearance:  age appropriate and older than stated age better groomed with combed hair fpleasant and friendly   Behavior:  evasive and guarded still questioning the room she was shown at the [facility], but more friendly and pleasant   Speech:  delayed, increased latency of response and tangential    Mood:  anxious, euphoric and irritable    Affect:  increased in intensity, increased in range, mood-congruent and redirectable   Thought Process:  circumstantial, concrete, goal directed, illogical and perserverative with tendency to have stilted speech   Thought Content:  delusions  grandiose and persecutory no current s/h thoughts intent or plans, no overt delusions but still suspicious of staff tampering with her food, meds and inhalant etc which is an ongoing paranoid belief  Still with psychosomatic sxs with fear of dying off and on  No preoccupation with violence  No phobias or other obsessions or compulsions   Demanding an echo and EKG for no apparent reasons   Perceptual Disturbances: None    Risk Potential: Inability to care for herself and refusal to take showers regularly   Sensorium:  person, place, time/date, situation, day of week, month of year, year and time   Cognition:  grossly intact with no deficit in recent or remote memory, no defiict in language, aware of current events   Consciousness:  alert and awake    Attention: fair   Intellect: Estimated to be at least average   Insight:  limited   Judgment: fair       Motor Activity: no abnormal movements         Recent Labs:  Results Reviewed     None          I/O Past 24 hours:  I/O last 3 completed shifts:  In: -   Out: 1 [Stool:1]  No intake/output data recorded  Assessment / Plan:     [facility]  Overall status:  improving    Reason for continued inpatient care: to assess ability to maintain improvement by attending to ADLs and taking showers regular and see how she does on outing with family after another outing with staff escort next week  Acceptance by patient: accepting now  Hopefulness in recovery: living at the [facility]  Understanding of medications :  yes  Involved in reintegration process: attending groups and has off grounds and off unit privileges   Trusting in relatoinship with psychiatrist:  Emiliano Marcano now  Overall status :  No changes  Recommended Treatment:    Medication changes:  1) none today    Non-pharmacological treatments  1) Continue with individual therapy, group therapy, milieu therapy and occupational therapy using recovery principles and psycho-education about accepting illness and need for treatment     2) encourage to take showers twice a week and cooperate with treatment and adl skills as per her behav  plan  3) another therapeutic pass with sister now that she did well  with the assigned staff escort to the park but it is now on hold due to corana virus  4) Prepare for the [address] [place] [address] Rd and encouraged to accept  rather than refuse it  Safety  1) Safety/communication plan established targeting dynamic risk factors above  Discharge Plan back to a [facility] at [address] / Coordination of Care    Total floor / unit time spent today 15 minutes  Greater than 50% of total time was spent with the patient and / or family counseling and / or coordination of care  Receptive to listening and learning coping skills for management of symptoms and attending to ADLs  Patient's Rights, confidentiality and exceptions to confidentiality, use of automated medical record, [facility]eline staff access to medical record, and consent to treatment reviewed      Sindy Day MD

## 2020-03-24 NOTE — PLAN OF CARE
Problem: Alteration in Thoughts and Perception  Goal: Agree to be compliant with medication regime, as prescribed and report medication side effects  Description  Interventions:  - Offer appropriate PRN medication and supervise ingestion; conduct aims, as needed   Outcome: Progressing  Goal: Attend and participate in unit activities, including therapeutic, recreational, and educational groups  Description  Interventions:  - Provide therapeutic and educational activities daily, encourage attendance and participation, and document same in the medical record     CERTIFIED PEER SPECIALIST INTERVENTIONS:    Complete peer assessment with patient to assess their needs and identify their goals to complete while in the recovery program as well as once discharged into the community  Patient will complete WRAP Plan, Crisis Plan and 5 Life Domains  Patient will attend 50% of groups offered on the unit  Patient will complete a goal card weekly  Outcome: Progressing     Problem: Depression  Goal: Refrain from isolation  Description  Interventions:  - Develop a trusting relationship   - Encourage socialization   Outcome: Not Progressing     Palmira Vogel has been intermittently visible on the unit, leaving her room to receive her morning medications and meals before retreating back to her room to sleep  Pleasant during interactions with this writer but remains isolative to her room for the majority of the shift  Blunted in affect and only attending select groups  Appetite remains poor, only eating 10 % of both her breakfast and lunch  Is able to make her needs known to staff  No complaints of discomfort, anxiety or depression at this time  Will continue to monitor for changes in behavior

## 2020-03-24 NOTE — PROGRESS NOTES
03/24/20 0900   Team Meeting   Meeting Type Tx Team Meeting   Initial Conference Date 03/24/20   Next Conference Date 03/31/20   Team Members Present   Team Members Present Physician;Nurse;; Other (Discipline and Name)   Physician Team Member Dr Josh Mayfield Team Member Idalia Rosenberg, RN   Care Management Team Member Brenda Rodriguez   Other (Discipline and Name) Maria De Jesus Higginbotham, JONATHON   Patient/Family Present   Patient Present Yes   Patient's Family Present No     Patient attended treatment team meeting this morning without her self assessment completed  Patient attended 43% of groups offered last week  Patient initially started the meeting with complaints about her GERD and her medications, stating she thinks they are "too strong"  Patient discussed with the team about her blood pressure and team encouraged her to not focus on the numbers overall and more about how she is feeling  Reported also that sporadically she is having angina over night  Wants to have an echocardiogram  Asked about calling Janis with [facility] to discuss how she would like to have her room arranged  A 30 day treatment plan review was completed with patient and patient signed the document  Team and patient completed risk assessment and the patient did not verbalize any desire to elope from the program  Patient verbalized understanding of consequences of eloping from treatment while on a commitment  Patient verbalized no further questions or concerns at the conclusion of the meeting

## 2020-03-24 NOTE — ASSESSMENT & PLAN NOTE
Patient finally took a shower yesterday on her own but was suspicious about the food that she ate as she thought someone might up tampered with that as well  She is still questions her oxygen concentrator and some of her medications as well as inhalant because of the suspicion that staff may be tampering with them as well off and on but is usually redirected  Her hygiene is good and she is well kept and well groomed today and hair is combed  She is now reporting to me that she is willing to work with the [facility] personal care home rather than dismiss it completely  She is eating and sleeping well and she has about 145 lb with 5 ft 4 in height witches ideal for her weight  She is still psychosomatic and is fearful about dying from choking on food or shortness of breath or from heart attack etc and now she is worried about the corona virus as well  Again demanding an echo and ekg etc! She is no longer complaining about year wax in her left ear today  Review of systems is unremarkable otherwise  Compliant with medications eating well sleeping well reports no side effects with no palpitations or constipation or drooling of saliva     Current medications:    Current Facility-Administered Medications:     acetaminophen (TYLENOL) tablet 325 mg, 325 mg, Oral, Q6H PRN, Zander Yanez MD    acetaminophen (TYLENOL) tablet 650 mg, 650 mg, Oral, Q6H PRN, Zander Yanez MD    acetaminophen (TYLENOL) tablet 650 mg, 650 mg, Oral, Q8H PRN, Zander Yanez MD    albuterol (PROVENTIL HFA,VENTOLIN HFA) inhaler 2 puff, 2 puff, Inhalation, Q4H PRN, Zander Yanez MD, 2 puff at 10/11/19 0424    aluminum-magnesium hydroxide-simethicone (MYLANTA) 200-200-20 mg/5 mL oral suspension 15 mL, 15 mL, Oral, Q4H PRN, Zander Yanez MD, 15 mL at 01/26/20 1021    ammonium lactate (LAC-HYDRIN) 12 % lotion 1 application, 1 application, Topical, BID PRN, Zander Yanez MD    benzonatate (TESSALON PERLES) capsule 100 mg, 100 mg, Oral, TID PRN, Oscar Silva Merry Rojas MD    benztropine (COGENTIN) injection 1 mg, 1 mg, Intramuscular, Q8H PRN, Jeffrey Gallegos MD    carbamide peroxide (DEBROX) 6 5 % otic solution 5 drop, 5 drop, Left Ear, BID PRN, Sarai Carranza MD    cloZAPine (CLOZARIL) tablet 25 mg, 25 mg, Oral, BID, Jeffrey Gallegos MD, 25 mg at 03/23/20 2051    cloZAPine (CLOZARIL) tablet 50 mg, 50 mg, Oral, BID, Jeffrey Gallegos MD, 50 mg at 03/23/20 2051    docusate sodium (COLACE) capsule 100 mg, 100 mg, Oral, BID PRN, Jeffrey Gallegos MD    EPINEPHrine PF (ADRENALIN) 1 mg/mL injection 0 15 mg, 0 15 mg, Intramuscular, Once PRN, Jeffrey Gallegos MD    fluticasone-vilanterol (BREO ELLIPTA) 200-25 MCG/INH inhaler 1 puff, 1 puff, Inhalation, Daily, Jeffrey Gallegos MD, 1 puff at 03/24/20 0826    ketotifen (ZADITOR) 0 025 % ophthalmic solution 1 drop, 1 drop, Right Eye, BID PRN, Jeffrey Gallegos MD    levothyroxine tablet 125 mcg, 125 mcg, Oral, Early Morning, Jeffrey Gallegos MD, 125 mcg at 03/24/20 0604    magnesium hydroxide (MILK OF MAGNESIA) 400 mg/5 mL oral suspension 30 mL, 30 mL, Oral, Daily PRN, Jeffrey Gallegos MD    montelukast (SINGULAIR) tablet 10 mg, 10 mg, Oral, HS, Jeffrey Gallegos MD, 10 mg at 02/22/20 2104    OLANZapine (ZyPREXA) IM injection 5 mg, 5 mg, Intramuscular, Q8H PRN, Jeffrey Gallegos MD    OLANZapine (ZyPREXA) tablet 5 mg, 5 mg, Oral, Q8H PRN, Jeffrey Gallegos MD    ondansetron (ZOFRAN-ODT) dispersible tablet 4 mg, 4 mg, Oral, Q6H PRN, Jeffrey Gallegos MD, 4 mg at 12/09/19 1757    pantoprazole (PROTONIX) EC tablet 40 mg, 40 mg, Oral, Early Morning, Jeffrey Gallegos MD, 40 mg at 03/24/20 0604    polyethylene glycol (MIRALAX) packet 17 g, 17 g, Oral, Daily PRN, Jeffrey Gallegos MD    polyvinyl alcohol (LIQUIFILM TEARS) 1 4 % ophthalmic solution 1 drop, 1 drop, Both Eyes, Q3H PRN, Jeffrey Gallegos MD    sertraline (ZOLOFT) tablet 175 mg, 175 mg, Oral, Daily, Jeffrey Gallegos MD, 175 mg at 03/24/20 0826    sucralfate (CARAFATE) oral suspension 1,000 mg, 1,000 mg, Oral, BID, Demetrice Cabezas MD, 1,000 mg at 03/24/20 0604    theophylline (JEF-24) 24 hr capsule 200 mg, 200 mg, Oral, Daily, Zac Sierra MD, 200 mg at 03/24/20 4052    tiotropium [facility]) capsule for inhaler 18 mcg, 18 mcg, Inhalation, Daily, Zac Sierra MD, 18 mcg at 03/24/20 0159    traZODone (DESYREL) tablet 25 mg, 25 mg, Oral, HS PRN, Zac Sierra MD  Justification if on more than two antipsychotics: not applicable  Side effects if any: none    Risks , benefits, side effects and precautions of medications discussed with patient and reminded patient to let us know any problems with medications     Objective:     Vital Signs:  Vitals:    03/23/20 1623 03/23/20 2002 03/23/20 2100 03/24/20 0700   BP: 106/73 106/65  101/63   BP Location: Left arm Left arm  Left arm   Pulse: 86 81  76   Resp: 16 18  18   Temp: (!) 97 3 °F (36 3 °C) 97 8 °F (36 6 °C)  98 3 °F (36 8 °C)   TempSrc: Temporal Temporal  Temporal   SpO2: 94% 94% 91%    Weight:       Height:         Appearance:  age appropriate and older than stated age better groomed with combed hair fpleasant and friendly   Behavior:  evasive and guarded still questioning the room she was shown at the [place], but more friendly and pleasant   Speech:  delayed, increased latency of response and tangential    Mood:  anxious, euphoric and irritable    Affect:  increased in intensity, increased in range, mood-congruent and redirectable   Thought Process:  circumstantial, concrete, goal directed, illogical and perserverative with tendency to have stilted speech   Thought Content:  delusions  grandiose and persecutory no current s/h thoughts intent or plans, no overt delusions but still suspicious of staff tampering with her food, meds and inhalant etc which is an ongoing paranoid belief  Still with psychosomatic sxs with fear of dying off and on  No preoccupation with violence  No phobias or other obsessions or compulsions    Demanding an echo and EKG for no apparent reasons   Perceptual Disturbances: None    Risk Potential: Inability to care for herself and refusal to take showers regularly   Sensorium:  person, place, time/date, situation, day of week, month of year, year and time   Cognition:  grossly intact with no deficit in recent or remote memory, no defiict in language, aware of current events   Consciousness:  alert and awake    Attention: fair   Intellect: Estimated to be at least average   Insight:  limited   Judgment: fair       Motor Activity: no abnormal movements         Recent Labs:  Results Reviewed     None          I/O Past 24 hours:  I/O last 3 completed shifts:  In: -   Out: 1 [Stool:1]  No intake/output data recorded  Assessment / Plan:     Schizoaffective disorder, bipolar type ([facility] )  Overall status:  improving    Reason for continued inpatient care: to assess ability to maintain improvement by attending to ADLs and taking showers regular and see how she does on outing with family after another outing with staff escort next week  Acceptance by patient: accepting now  Hopefulness in recovery: living at the [facility] soon  Understanding of medications :  yes  Involved in reintegration process: attending groups and has off grounds and off unit privileges   Trusting in relatoinship with psychiatrist:  Tiffanie Yates now  Overall status :  No changes  Recommended Treatment:    Medication changes:  1) none today    Non-pharmacological treatments  1) Continue with individual therapy, group therapy, milieu therapy and occupational therapy using recovery principles and psycho-education about accepting illness and need for treatment     2) encourage to take showers twice a week and cooperate with treatment and adl skills as per her behav  plan  3) another therapeutic pass with sister now that she did well  with the [place] staff escort [place] the park but it is now on hold due to corana virus  4) Prepare for the [facility] and encouraged to accept  rather than refuse it  Safety  1) Safety/communication plan established targeting dynamic risk factors above  Discharge Plan back to a [facility] at [address] / Coordination of Care    Total floor / unit time spent today 15 minutes  Greater than 50% of total time was spent with the patient and / or family counseling and / or coordination of care  Receptive to listening and learning coping skills for management of symptoms and attending to ADLs  Patient's Rights, confidentiality and exceptions to confidentiality, use of automated medical record, [facility]eline staff access to medical record, and consent to treatment reviewed      Ervin Little MD

## 2020-03-24 NOTE — PROGRESS NOTES
Sleeping at this time, no s/s of distress noted  O2 on at 1L/NC  All precautions in place and maintained   Monitoring continues

## 2020-03-25 NOTE — TREATMENT TEAM
Patient declined      03/25/20 1100   Activity/Group Checklist   Group   (IMR/Daily Routine )   Attendance Did not attend   Attendance Duration (min) 46-60   Affect/Mood IRMA

## 2020-03-25 NOTE — PLAN OF CARE
Problem: Risk for Self Injury/Neglect  Goal: Verbalize thoughts and feelings  Description  Interventions:  - Assess and re-assess patient's lethality and potential for self-injury  - Engage patient in 1:1 interactions, daily, for a minimum of 15 minutes  - Encourage patient to express feelings, fears, frustrations, hopes  - Establish rapport/trust with patient   Outcome: Progressing     Problem: SAFETY ADULT  Goal: Patient will remain free of falls  Description  INTERVENTIONS:  - Assess patient frequently for physical needs  -  Identify cognitive and physical deficits and behaviors that affect risk of falls  -  [place] fall precautions as indicated by assessment   - Educate patient/family on patient safety including physical limitations  - Instruct patient to call for assistance with activity based on assessment  - Modify environment to reduce risk of injury  - Consider OT/PT consult to assist with strengthening/mobility  Outcome: Progressing     Problem: Nutrition/Hydration-ADULT  Goal: Nutrient/Hydration intake appropriate for improving, restoring or maintaining nutritional needs  Description  Monitor and assess patient's nutrition/hydration status for malnutrition  Collaborate with interdisciplinary team and initiate plan and interventions as ordered  Monitor patient's weight and dietary intake as ordered or per policy  Utilize nutrition screening tool and intervene as necessary  Determine patient's food preferences and provide high-protein, high-caloric foods as appropriate       INTERVENTIONS:  - Monitor oral intake, urinary output, labs, and treatment plans  - Assess nutrition and hydration status and recommend course of action  - Evaluate amount of meals eaten  - Assist patient with eating if necessary   - Allow adequate time for meals  - Recommend/ encourage appropriate diets, oral nutritional supplements, and vitamin/mineral supplements  - Order, calculate, and assess calorie counts as needed  - Recommend, monitor, and adjust tube feedings and TPN/PPN based on assessed needs  - Assess need for intravenous fluids  - Provide specific nutrition/hydration education as appropriate  - Include patient/family/caregiver in decisions related to nutrition  Outcome: Progressing     Problem: Risk for Self Injury/Neglect  Goal: Attend and participate in unit activities, including therapeutic, recreational, and educational groups  Description  Interventions:  - Provide therapeutic and educational activities daily, encourage attendance and participation, and document same in the medical record  - Obtain collateral information, encourage visitation and family involvement in care   Outcome: Not Progressing  Goal: Complete daily ADLs, including personal hygiene independently, as able  Description  Interventions:  - Observe, teach, and assist patient with ADLS  - Monitor and promote a balance of rest/activity, with adequate nutrition and elimination  Outcome: Not Progressing       Compliant with routine medications and meals, gait steady denied pain, did not attend groups, limited interaction with staff and peers will continue to monitor and provide support as needed

## 2020-03-25 NOTE — ASSESSMENT & PLAN NOTE
Patient's report no complaints but sporadically refuses some of the meals or eats sparingly but has maintained her body weight around 145 lb in the last 3 months with no significant weight loss  She is sleeping well and does attend some of the groups as required and expected but below 50% and she knows that she needs to keep up with the requirement  She is now willing to accept the [facility] personal care home where they may have a bed soon and is now hoping that they would accommodate her by covering part of her window so that she will not feel like falling off from the room were there have 2 tall windows from the floor to the bottom overlooking the parking lot  Compliant with medications and tolerating medications with no side effects or problems  No side effects like palpitations or constipation or drooling reported lately  He still voices some anxiety over the psychosomatic symptoms about patric corona virus, dying of talking from food, from a heart attack from coma from shortness of breath etc   Still appears to have a some underlying paranoia and suspicion about staff tampering with her medications food spirometer and oxygen concentrator which are fixed delusions  Is usually friendly pleasant and polite when approached       Current medications:    Current Facility-Administered Medications:     acetaminophen (TYLENOL) tablet 325 mg, 325 mg, Oral, Q6H PRN, Roberto Shankar MD    acetaminophen (TYLENOL) tablet 650 mg, 650 mg, Oral, Q6H PRN, Roberto Shankar MD    acetaminophen (TYLENOL) tablet 650 mg, 650 mg, Oral, Q8H PRN, Roberto Shankar MD    albuterol (PROVENTIL HFA,VENTOLIN HFA) inhaler 2 puff, 2 puff, Inhalation, Q4H PRN, Roberto Shankar MD, 2 puff at 10/11/19 0424    aluminum-magnesium hydroxide-simethicone (MYLANTA) 200-200-20 mg/5 mL oral suspension 15 mL, 15 mL, Oral, Q4H PRN, Roberto Shankar MD, 15 mL at 01/26/20 1021    ammonium lactate (LAC-HYDRIN) 12 % lotion 1 application, 1 application, Topical, BID PRN, Vincent Olivares MD    benzonatate (TESSALON PERLES) capsule 100 mg, 100 mg, Oral, TID PRN, Vincent Olivares MD    benztropine (COGENTIN) injection 1 mg, 1 mg, Intramuscular, Q8H PRN, Vincent Olivares MD    carbamide peroxide (DEBROX) 6 5 % otic solution 5 drop, 5 drop, Left Ear, BID PRN, Hernandez Zaragoza MD    cloZAPine (CLOZARIL) tablet 25 mg, 25 mg, Oral, BID, Vincent Olivares MD, 25 mg at 03/24/20 2117    cloZAPine (CLOZARIL) tablet 50 mg, 50 mg, Oral, BID, Vincent Olivares MD, 50 mg at 03/24/20 2115    docusate sodium (COLACE) capsule 100 mg, 100 mg, Oral, BID PRN, Vincent Olivares MD    EPINEPHrine PF (ADRENALIN) 1 mg/mL injection 0 15 mg, 0 15 mg, Intramuscular, Once PRN, Vincent Olivares MD    fluticasone-vilanterol (BREO ELLIPTA) 200-25 MCG/INH inhaler 1 puff, 1 puff, Inhalation, Daily, Vincent Olivares MD, 1 puff at 03/24/20 0826    ketotifen (ZADITOR) 0 025 % ophthalmic solution 1 drop, 1 drop, Right Eye, BID PRN, Vincent Olivares MD    levothyroxine tablet 125 mcg, 125 mcg, Oral, Early Morning, Vincent Olivares MD, 125 mcg at 03/25/20 0600    magnesium hydroxide (MILK OF MAGNESIA) 400 mg/5 mL oral suspension 30 mL, 30 mL, Oral, Daily PRN, Vincent Olivares MD    montelukast (SINGULAIR) tablet 10 mg, 10 mg, Oral, HS, Vincent Olivares MD, 10 mg at 02/22/20 2104    OLANZapine (ZyPREXA) IM injection 5 mg, 5 mg, Intramuscular, Q8H PRN, Vincent Olivares MD    OLANZapine (ZyPREXA) tablet 5 mg, 5 mg, Oral, Q8H PRN, Vincent Olivares MD    ondansetron (ZOFRAN-ODT) dispersible tablet 4 mg, 4 mg, Oral, Q6H PRN, Vincent Olivares MD, 4 mg at 12/09/19 1757    pantoprazole (PROTONIX) EC tablet 40 mg, 40 mg, Oral, Early Morning, Vincent Olivares MD, 40 mg at 03/25/20 0600    polyethylene glycol (MIRALAX) packet 17 g, 17 g, Oral, Daily PRN, Vincent Olivares MD    polyvinyl alcohol (LIQUIFILM TEARS) 1 4 % ophthalmic solution 1 drop, 1 drop, Both Eyes, Q3H PRN, Vincent Olivares MD    sertraline (ZOLOFT) tablet 175 mg, 175 mg, Oral, Daily, Vincent Olivares MD, 175 mg at 03/24/20 9849    sucralfate (CARAFATE) oral suspension 1,000 mg, 1,000 mg, Oral, BID, Cat Torres MD, 1,000 mg at 03/25/20 0600    theophylline (JEF-24) 24 hr capsule 200 mg, 200 mg, Oral, Daily, Christian Bennett MD, 200 mg at 03/24/20 2437    tiotropium [facility]) capsule for inhaler 18 mcg, 18 mcg, Inhalation, Daily, Christian Bennett MD, 18 mcg at 03/24/20 5238    traZODone (DESYREL) tablet 25 mg, 25 mg, Oral, HS PRN, Christian Bennett MD  Justification if on more than two antipsychotics: not applicable  Side effects if any: none    Risks , benefits, side effects and precautions of medications discussed with patient and reminded patient to let us know any problems with medications     Objective:     Vital Signs:  Vitals:    03/24/20 0700 03/24/20 1627 03/24/20 2005 03/25/20 0700   BP: 101/63 90/60 103/60 114/65   BP Location: Left arm Left arm Left arm Right arm   Pulse: 76 90 83 84   Resp: 18 16 16 18   Temp: 98 3 °F (36 8 °C) (!) 97 °F (36 1 °C) (!) 97 3 °F (36 3 °C) (!) 97 2 °F (36 2 °C)   TempSrc: Temporal Temporal Temporal    SpO2:  94% 93% 92%   Weight:       Height:         Appearance:  age appropriate and older than stated age better groomed pleasant friendly found sitting on bed    Behavior:  evasive and guarded friendly and pleasant but questioning motives of the staff sometimes not trusting them with the pills unless she examines them   Speech:  delayed, increased latency of response and tangential    Mood:  anxious, euphoric and irritable    Affect:  increased in intensity, increased in range, mood-congruent and redirectable   Thought Process:  circumstantial, concrete, goal directed, illogical and perserverative with tendency to become stilted   Thought Content:  delusions  grandiose and persecutory no overt delusions elicited other than some suspiciousness of staff tampering with her food, with the medications oxygen concentrator etc and insists on checking the medications before taking them    No preoccupation with violence or suicide  Still upset with the room offered to her at the personal care home but willing to work with them  No phobias obsessions or compulsions  Still has some psychosomatic symptoms of fear of death from choking on food, getting the corona virus, from heart attack etc   Perceptual Disturbances: None    Risk Potential: Inability to care for herself and refusal to take showers regularly   Sensorium:  person, place, time/date, situation, day of week, month of year, year and time   Cognition:  grossly intact with no deficit in recent or remote memory, no deficit in language, aware of current events liked the corona virus restrictions   Consciousness:  alert and awake    Attention: fair   Intellect: Average   Insight:  limited   Judgment: fair       Motor Activity: no abnormal movements         Recent Labs:  Results Reviewed     None          I/O Past 24 hours:  I/O last 3 completed shifts:  In: -   Out: 1 [Stool:1]  No intake/output data recorded  Assessment / Plan:     [facility]ive [facility] ([facility]  Overall status:  improving    Reason for continued inpatient care: to assess ability to maintain improvement by attending to ADLs and taking showers regular and see how she does on outing with family after another outing with staff escort next week  Acceptance by patient: accepting now  Hopefulness in recovery: living at the [facility] soon  Understanding of medications :  yes  Involved in reintegration process: attending groups and has off grounds and off unit privileges   Trusting in relatoinship with psychiatrist:  Remy Carlos now  Overall status :  No changes  Recommended Treatment:    Medication changes:  1) none today    Non-pharmacological treatments  1) Continue with individual therapy, group therapy, milieu therapy and occupational therapy using recovery principles and psycho-education about accepting illness and need for treatment     2) encourage to take showers twice a week and cooperate with treatment and adl skills as per her behav  plan  3) another therapeutic pass with sister now that she did well  with the assigned staff escort to the park but it is now on hold due to corana virus  4) Prepare for the [address] [place] [address] Rd and encouraged to accept  rather than refuse it  Safety  1) Safety/communication plan established targeting dynamic risk factors above  Discharge Plan back to a [facility] at [address] / Coordination of Care    Total floor / unit time spent today 15 minutes  Greater than 50% of total time was spent with the patient and / or family counseling and / or coordination of care  Receptive to listening and learning coping skills for management of symptoms and attending to ADLs  Patient's Rights, confidentiality and exceptions to confidentiality, use of automated medical record, [facility] to [facility] record, and consent to treatment reviewed      Sarah Hanna MD

## 2020-03-25 NOTE — PROGRESS NOTES
Pt sleeping in room beginning of shift, prompted for med & meals  Ate 10% of dinner w/ensure  Isolative to room/self, no behavioral issues, will continue to monitor

## 2020-03-25 NOTE — PROGRESS NOTES
03/25/20 0800   Team Meeting   Meeting Type Daily Rounds   Team Members Present   Team Members Present Physician;Nurse;; Other (Discipline and Name)   Physician Team Member Dr Shane Berg Team Member Lyndsey Olivas RN   Care Management Team Member Brenda Rendon   Other (Discipline and Name) Libertad Ferrer LCSW; SHANIKA Kent     Patient presents with no behavioral changes  Slept

## 2020-03-25 NOTE — PROGRESS NOTES
Progress Note - Rosana Lindsay 1955, 60 y o  female MRN: [id number]    [facility] Encounter: [id number]    Primary Care Provider: Bethanie Zaragoza PA-C   Date and time admitted to hospital: 7/23/2019  5:30 PM        * Schizoaffective disorder, bipolar type [facility])  Assessment & Plan  Patient's report no complaints but sporadically refuses some of the meals or eats sparingly but has maintained her body weight around 145 lb in the last 3 months with no significant weight loss  She is sleeping well and does attend some of the groups as required and expected but below 50% and she knows that she needs to keep up with the requirement  She is now willing to accept the [facility] personal care home where they may have a bed soon and is now hoping that they would accommodate her by covering part of her window so that she will not feel like falling off from the room were there have 2 tall windows from the floor to the bottom overlooking the parking lot  Compliant with medications and tolerating medications with no side effects or problems  No side effects like palpitations or constipation or drooling reported lately  He still voices some anxiety over the psychosomatic symptoms about patric corona virus, dying of talking from food, from a heart attack from coma from shortness of breath etc   Still appears to have a some underlying paranoia and suspicion about staff tampering with her medications food spirometer and oxygen concentrator which are fixed delusions  Is usually friendly pleasant and polite when approached       Current medications:    Current Facility-Administered Medications:     acetaminophen (TYLENOL) tablet 325 mg, 325 mg, Oral, Q6H PRN, Deedee Muir MD    acetaminophen (TYLENOL) tablet 650 mg, 650 mg, Oral, Q6H PRN, Deedee Muir MD    acetaminophen (TYLENOL) tablet 650 mg, 650 mg, Oral, Q8H PRN, Deedee Muir MD    albuterol (PROVENTIL HFA,VENTOLIN HFA) inhaler 2 puff, 2 puff, Inhalation, Q4H PRN, Jaz Beasley MD, 2 puff at 10/11/19 0424    aluminum-magnesium hydroxide-simethicone (MYLANTA) 200-200-20 mg/5 mL oral suspension 15 mL, 15 mL, Oral, Q4H PRN, Jaz Beasley MD, 15 mL at 01/26/20 1021    ammonium lactate (LAC-HYDRIN) 12 % lotion 1 application, 1 application, Topical, BID PRN, Jaz Beasley MD    benzonatate (TESSALON PERLES) capsule 100 mg, 100 mg, Oral, TID PRN, Jaz Beasley MD    benztropine (COGENTIN) injection 1 mg, 1 mg, Intramuscular, Q8H PRN, Jaz Beasley MD    carbamide peroxide (DEBROX) 6 5 % otic solution 5 drop, 5 drop, Left Ear, BID PRN, Vlad Ballard MD    cloZAPine (CLOZARIL) tablet 25 mg, 25 mg, Oral, BID, Jaz Beasley MD, 25 mg at 03/24/20 2117    cloZAPine (CLOZARIL) tablet 50 mg, 50 mg, Oral, BID, Jaz Beasley MD, 50 mg at 03/24/20 2115    docusate sodium (COLACE) capsule 100 mg, 100 mg, Oral, BID PRN, Jaz Beasley MD    EPINEPHrine PF (ADRENALIN) 1 mg/mL injection 0 15 mg, 0 15 mg, Intramuscular, Once PRN, Jaz Beasley MD    fluticasone-vilanterol (BREO ELLIPTA) 200-25 MCG/INH inhaler 1 puff, 1 puff, Inhalation, Daily, Jaz Beasley MD, 1 puff at 03/24/20 0826    ketotifen (ZADITOR) 0 025 % ophthalmic solution 1 drop, 1 drop, Right Eye, BID PRN, Jaz Beasley MD    levothyroxine tablet 125 mcg, 125 mcg, Oral, Early Morning, Jaz Beasley MD, 125 mcg at 03/25/20 0600    magnesium hydroxide (MILK OF MAGNESIA) 400 mg/5 mL oral suspension 30 mL, 30 mL, Oral, Daily PRN, Jaz Beasley MD    montelukast (SINGULAIR) tablet 10 mg, 10 mg, Oral, HS, Jaz Beasley MD, 10 mg at 02/22/20 2104    OLANZapine (ZyPREXA) IM injection 5 mg, 5 mg, Intramuscular, Q8H PRN, Jaz Beasley MD    OLANZapine (ZyPREXA) tablet 5 mg, 5 mg, Oral, Q8H PRN, Jaz Beasley MD    ondansetron (ZOFRAN-ODT) dispersible tablet 4 mg, 4 mg, Oral, Q6H PRN, Jaz Beasley MD, 4 mg at 12/09/19 1757    pantoprazole (PROTONIX) EC tablet 40 mg, 40 mg, Oral, Early Morning, Jaz Beasley MD, 40 mg at 03/25/20 0600    polyethylene glycol (MIRALAX) packet 17 g, 17 g, Oral, Daily PRN, Deep Durand MD    polyvinyl alcohol (LIQUIFILM TEARS) 1 4 % ophthalmic solution 1 drop, 1 drop, Both Eyes, Q3H PRN, Deep Durand MD    sertraline (ZOLOFT) tablet 175 mg, 175 mg, Oral, Daily, Deep Durand MD, 175 mg at 03/24/20 2703    sucralfate (CARAFATE) oral suspension 1,000 mg, 1,000 mg, Oral, BID, Cat Torres MD, 1,000 mg at 03/25/20 0600    theophylline (JEF-24) 24 hr capsule 200 mg, 200 mg, Oral, Daily, Deep Durand MD, 200 mg at 03/24/20 0826    tiotropium [facility]) capsule for inhaler 18 mcg, 18 mcg, Inhalation, Daily, Deep Durand MD, 18 mcg at 03/24/20 1839    traZODone (DESYREL) tablet 25 mg, 25 mg, Oral, HS PRN, Deep Durand MD  Justification if on more than two antipsychotics: not applicable  Side effects if any: none    Risks , benefits, side effects and precautions of medications discussed with patient and reminded patient to let us know any problems with medications     Objective:     Vital Signs:  Vitals:    03/24/20 0700 03/24/20 1627 03/24/20 2005 03/25/20 0700   BP: 101/63 90/60 103/60 114/65   BP Location: Left arm Left arm Left arm Right arm   Pulse: 76 90 83 84   Resp: 18 16 16 18   Temp: 98 3 °F (36 8 °C) (!) 97 °F (36 1 °C) (!) 97 3 °F (36 3 °C) (!) 97 2 °F (36 2 °C)   TempSrc: Temporal Temporal Temporal    SpO2:  94% 93% 92%   Weight:       Height:         Appearance:  age appropriate and older than stated age better groomed pleasant friendly found sitting on bed    Behavior:  evasive and guarded friendly and pleasant but questioning motives of the staff sometimes not trusting them with the pills unless she examines them   Speech:  delayed, increased latency of response and tangential    Mood:  anxious, euphoric and irritable    Affect:  increased in intensity, increased in range, mood-congruent and redirectable   Thought Process:  circumstantial, concrete, goal directed, illogical and perserverative with tendency to become stilted   Thought Content:  delusions  grandiose and persecutory no overt delusions elicited other than some suspiciousness of staff tampering with her food, with the medications oxygen concentrator etc and insists on checking the medications before taking them  No preoccupation with violence or suicide  Still upset with the room offered to her at the personal care home but willing to work with them  No phobias obsessions or compulsions  Still has some psychosomatic symptoms of fear of death from choking on food, getting the corona virus, from heart attack etc   Perceptual Disturbances: None    Risk Potential: Inability to care for herself and refusal to take showers regularly   Sensorium:  person, place, time/date, situation, day of week, month of year, year and time   Cognition:  grossly intact with no deficit in recent or remote memory, no deficit in language, aware of current events liked the corona virus restrictions   Consciousness:  alert and awake    Attention: fair   Intellect: Average   Insight:  limited   Judgment: fair       Motor Activity: no abnormal movements         Recent Labs:  Results Reviewed     None          I/O Past 24 hours:  I/O last 3 completed shifts:  In: -   Out: 1 [Stool:1]  No intake/output data recorded          Assessment / Plan:     Schizoaffective disorder, bipolar type ([facility] )  Overall status:  improving    Reason for continued inpatient care: to assess ability to maintain improvement by attending to ADLs and taking showers regular and see how she does on outing with family after another outing with staff escort next week  Acceptance by patient: accepting now  Hopefulness in recovery: living at the [facility] soon  Understanding of medications :  yes  Involved in reintegration process: attending groups and has off grounds and off unit privileges   Trusting in relatoinship with psychiatrist:  Nino Menendez now  Overall status :  No changes  Recommended Treatment:    Medication changes:  1) none today    Non-pharmacological treatments  1) Continue with individual therapy, group therapy, milieu therapy and occupational therapy using recovery principles and psycho-education about accepting illness and need for treatment   2) encourage to take showers twice a week and cooperate with treatment and adl skills as per her behav  plan  3) another therapeutic pass with sister now that she did well  with the assigned staff escort to the park but it is now on hold due to corana virus  4) Prepare for the [address] [place] [address] and encouraged to accept  rather than refuse it  Safety  1) Safety/communication plan established targeting dynamic risk factors above  Discharge Plan back to a [facility] at [address] / Coordination of Care    Total floor / unit time spent today 15 minutes  Greater than 50% of total time was spent with the patient and / or family counseling and / or coordination of care  Receptive to listening and learning coping skills for management of symptoms and attending to ADLs  Patient's Rights, confidentiality and exceptions to confidentiality, use of automated medical record, [facility] staff access to medical record, and consent to treatment reviewed      Felisa Florence MD

## 2020-03-26 NOTE — PROGRESS NOTES
Maggie maintained on ongoing fall and SAFE precaution  Freda Fatimahhien in bed with eyes closed, breath even and unlabored   On O2 with humidifier @1L/m via nasal cannula  Continues rounding implemented   No somatic complaint overnight  No PRN needed for sleep aid   No indication of pain or discomfort   No respiratory distress   Will continue to monitor

## 2020-03-26 NOTE — PROGRESS NOTES
Progress Note - Gigi Jenkins 1955, 60 y o  female MRN: [id number]    [facility] Encounter: [id number]    Primary Care Provider: Bonnee Favre, PA-C   Date and time admitted to hospital: 7/23/2019  5:30 PM        * Schizoaffective disorder, bipolar type [facility])  Assessment & Plan  Patient reports no major issues and has not taken showers since she took a shower 2 days ago and tells me that she has only required to take showers twice a week  She is still suspicious about the motives of certain staff blaming them for tampering with her food or her inhaler or her oxygen spirometer  She is again somewhat accusatory and believes that she may die from heart attack or choking on food or shortness of breath or Koran 0 virus and continues to need frequent support and reassurance  She sometimes demands to get an echocardiogram and EKG and a cardiac consult despite no symptoms  She is attending some of the groups  She is now willing to accommodate the personal care home where she had gone for a tour and is hoping that they would make some changes with the furniture in her room to block part of the told been does in the assigned room as she is afraid of falling down into the parking lot which is right next to her room  Eats and sleeps well  Compliant with medications and no side effects  Review of systems unremarkable otherwise     Current medications:    Current Facility-Administered Medications:     acetaminophen (TYLENOL) tablet 325 mg, 325 mg, Oral, Q6H PRN, Faheem Daniels MD    acetaminophen (TYLENOL) tablet 650 mg, 650 mg, Oral, Q6H PRN, Faheem Daniels MD    acetaminophen (TYLENOL) tablet 650 mg, 650 mg, Oral, Q8H PRN, Faheem Daniels MD    albuterol (PROVENTIL HFA,VENTOLIN HFA) inhaler 2 puff, 2 puff, Inhalation, Q4H PRN, Faheem Daniels MD, 2 puff at 10/11/19 0424    aluminum-magnesium hydroxide-simethicone (MYLANTA) 200-200-20 mg/5 mL oral suspension 15 mL, 15 mL, Oral, Q4H PRN, Faheem Daniels MD, 15 mL at 01/26/20 1021    ammonium lactate (LAC-HYDRIN) 12 % lotion 1 application, 1 application, Topical, BID PRN, Isidoro Gonzalez MD    benzonatate (TESSALON PERLES) capsule 100 mg, 100 mg, Oral, TID PRN, Isidoro Gonzalez MD    benztropine (COGENTIN) injection 1 mg, 1 mg, Intramuscular, Q8H PRN, Isidoro Gonzalez MD    carbamide peroxide (DEBROX) 6 5 % otic solution 5 drop, 5 drop, Left Ear, BID PRN, Kyrie Zambrano MD    cloZAPine (CLOZARIL) tablet 25 mg, 25 mg, Oral, BID, Isidoro Gonzalez MD, 25 mg at 03/25/20 2128    cloZAPine (CLOZARIL) tablet 50 mg, 50 mg, Oral, BID, Isidoro Gonzalez MD, 50 mg at 03/25/20 2127    docusate sodium (COLACE) capsule 100 mg, 100 mg, Oral, BID PRN, Isidoro Gonzalez MD    EPINEPHrine PF (ADRENALIN) 1 mg/mL injection 0 15 mg, 0 15 mg, Intramuscular, Once PRN, Isidoro Gonzalez MD    fluticasone-vilanterol (BREO ELLIPTA) 200-25 MCG/INH inhaler 1 puff, 1 puff, Inhalation, Daily, Isidoro Gonzalez MD, 1 puff at 03/25/20 0911    ketotifen (ZADITOR) 0 025 % ophthalmic solution 1 drop, 1 drop, Right Eye, BID PRN, Isidoro Gonzalez MD    levothyroxine tablet 125 mcg, 125 mcg, Oral, Early Morning, Isidoro Gonzalez MD, 125 mcg at 03/26/20 0606    magnesium hydroxide (MILK OF MAGNESIA) 400 mg/5 mL oral suspension 30 mL, 30 mL, Oral, Daily PRN, Isidoro Gonzalez MD    montelukast (SINGULAIR) tablet 10 mg, 10 mg, Oral, HS, Isidoro Gonzalez MD, 10 mg at 02/22/20 2104    OLANZapine (ZyPREXA) IM injection 5 mg, 5 mg, Intramuscular, Q8H PRN, Isidoro Gonzalez MD    OLANZapine (ZyPREXA) tablet 5 mg, 5 mg, Oral, Q8H PRN, Isidoro Gonzalez MD    ondansetron (ZOFRAN-ODT) dispersible tablet 4 mg, 4 mg, Oral, Q6H PRN, Isidoro Gonzalez MD, 4 mg at 12/09/19 1757    pantoprazole (PROTONIX) EC tablet 40 mg, 40 mg, Oral, Early Morning, Isidoro Gonzalez MD, 40 mg at 03/26/20 0606    polyethylene glycol (MIRALAX) packet 17 g, 17 g, Oral, Daily PRN, Isidoro Gonzalez MD    polyvinyl alcohol (LIQUIFILM TEARS) 1 4 % ophthalmic solution 1 drop, 1 drop, Both Eyes, Q3H PRN, Jeet Levine MD    sertraline (ZOLOFT) tablet 175 mg, 175 mg, Oral, Daily, Jeet Levine MD, 175 mg at 03/25/20 8226    sucralfate (CARAFATE) oral suspension 1,000 mg, 1,000 mg, Oral, BID, Carola Fournier MD, 1,000 mg at 03/26/20 0606    theophylline (JEF-24) 24 hr capsule 200 mg, 200 mg, Oral, Daily, Jeet Levine MD, 200 mg at 03/25/20 0905    tiotropium [facility]) capsule for inhaler 18 mcg, 18 mcg, Inhalation, Daily, Jeet Levine MD, 18 mcg at 03/25/20 0910    traZODone (DESYREL) tablet 25 mg, 25 mg, Oral, HS PRN, Jeet Levine MD  Justification if on more than two antipsychotics: not applicable  Side effects if any: none    Risks , benefits, side effects and precautions of medications discussed with patient and reminded patient to let us know any problems with medications     Objective:     Vital Signs:  Vitals:    03/24/20 2005 03/25/20 0700 03/25/20 1700 03/25/20 2000   BP: 103/60 114/65 107/64 113/69   BP Location: Left arm Right arm Left arm Left arm   Pulse: 83 84 79 83   Resp: 16 18 16 17   Temp: (!) 97 3 °F (36 3 °C) (!) 97 2 °F (36 2 °C) (!) 97 1 °F (36 2 °C) (!) 97 °F (36 1 °C)   TempSrc: Temporal  Temporal Temporal   SpO2: 93% 92% 91% 93%   Weight:       Height:         Appearance:  age appropriate and older than stated age better groomed but with uncombed hair sitting on bed with good eye contact   Behavior:  evasive and guarded superficially friendly and pleasant but appears suspicious    Speech:  delayed, increased latency of response and tangential    Mood:  anxious, euphoric and irritable    Affect:  increased in intensity, increased in range, mood-congruent and redirectable   Thought Process:  circumstantial, concrete, goal directed, illogical and perserverative with tendency to have stilted speech   Thought Content:  delusions  grandiose and persecutory no overt delusions elicited but appears suspicious of staff tampering with her medication or her inhalant or the oxygen concentrator or off and on  Still psychosomatic about fear of death from multiple somatic complaints  No other obsessions or compulsions  No current suicidal homicidal thoughts intent or plans  Perceptual Disturbances: None    Risk Potential: Inability to care for herself and refusal to take showers regularly   Sensorium:  person, place, time/date, situation, day of week, month of year, year and time   Cognition:  grossly intact with no deficit in recent or remote memory and not deficit in language, aware of current events   Consciousness:  alert and awake    Attention: Fair   Intellect: Average   Insight:  Limited   Judgment: fair       Motor Activity: no abnormal movements         Recent Labs:  Results Reviewed     None          I/O Past 24 hours:  No intake/output data recorded  No intake/output data recorded  Assessment / Plan:     Schizoaffective disorder, bipolar type ([facility] [address] )  Overall status:  improving    Reason for continued inpatient care: to assess ability to maintain improvement by attending to ADLs and taking showers regular and see how she does on outing with family after another outing with staff escort next week  Acceptance by patient: accepting now  Hopefulness in recovery: living at the [facility]  Understanding of medications :  yes  Involved in reintegration process: attending groups and has off grounds and off unit privileges   Trusting in relatoinship with psychiatrist:  Isabella Bai now  Overall status :  No changes  Recommended Treatment:    Medication changes:  1) none today    Non-pharmacological treatments  1) Continue with individual therapy, group therapy, milieu therapy and occupational therapy using recovery principles and psycho-education about accepting illness and need for treatment     2) encourage to take showers twice a week and cooperate with treatment and adl skills as per her behav  plan  3) another therapeutic pass with sister now that she did well  with the assigned staff escort to the park but it is now on hold due to corana virus  4) [facility] for the [facility] and encouraged to accept  rather than refuse it  Safety  1) Safety/communication plan established targeting dynamic risk factors above  Discharge Plan back to a [facility] at [address] / Coordination of Care    Total floor / unit time spent today 15 minutes  Greater than 50% of total time was spent with the patient and / or family counseling and / or coordination of care  Receptive to listening and learning coping skills for management of symptoms and attending to ADLs  Patient's Rights, confidentiality and exceptions to confidentiality, use of automated medical record, [facility] staff access to medical record, and consent to treatment reviewed      Sarah Hanna MD

## 2020-03-26 NOTE — PLAN OF CARE
Problem: Alteration in Thoughts and Perception  Goal: Verbalize thoughts and feelings  Description  Interventions:  - Promote a nonjudgmental and trusting relationship with the patient through active listening and therapeutic communication  - Assess patient's level of functioning, behavior and potential for risk  - Engage patient in 1 on 1 interactions for a minimum of 15 minutes each session  - Encourage patient to express fears, feelings, frustrations, and discuss symptoms    - Rule patient to reality, help patient recognize reality-based thinking   - Administer medications as ordered and assess for potential side effects  - Provide the patient education related to the signs and symptoms of the illness and desired effects of prescribed medications  Outcome: Progressing  Goal: Agree to be compliant with medication regime, as prescribed and report medication side effects  Description  Interventions:  - Offer appropriate PRN medication and supervise ingestion; conduct aims, as needed   Outcome: Progressing  Goal: Attend and participate in unit activities, including therapeutic, recreational, and educational groups  Description  Interventions:  - Provide therapeutic and educational activities daily, encourage attendance and participation, and document same in the medical record     CERTIFIED PEER SPECIALIST INTERVENTIONS:    Complete peer assessment with patient to assess their needs and identify their goals to complete while in the recovery program as well as once discharged into the community  Patient will complete WRAP Plan, Crisis Plan and 5 Life Domains  Patient will attend 50% of groups offered on the unit  Patient will complete a goal card weekly      Outcome: Progressing     Problem: Alteration in Orientation  Goal: Interact with staff daily  Description  Interventions:  - Assess and re-assess patient's level of orientation  - Engage patient in 1 on 1 interactions, daily, for a minimum of 15 minutes   - Establish rapport/trust with patient   Outcome: Progressing     Problem: Alteration in Thoughts and Perception  Goal: Complete daily ADLs, including personal hygiene independently, as able  Description  Interventions:  - Observe, teach, and assist patient with ADLS  - Monitor and promote a balance of rest/activity, with adequate nutrition and elimination   Outcome: Not Progressing     Problem: Depression  Goal: Refrain from isolation  Description  Interventions:  - Develop a trusting relationship   - Encourage socialization   Outcome: Not Progressing     Yanna Singh was in bed sleeping at the beginning of the shift  Has not been somatic  No complaints  No distress noted  Took medication without an issue  Ate 25% of her meal and drank Ensure  Some interaction with peers while in the dining room  Pleasant and cooperative when approached by staff  Able to make needs known  Did not attend evening group  Took HS medication  Ate snack  Oxygen saturation 93% prior to oxygen 1 liter (humidified) via nasal cannula applied  Continue to monitor  Precautions maintained

## 2020-03-26 NOTE — ASSESSMENT & PLAN NOTE
Patient reports no major issues and has not taken showers since she took a shower 2 days ago and tells me that she has only required to take showers twice a week  She is still suspicious about the motives of certain staff blaming them for tampering with her food or her inhaler or her oxygen spirometer  She is again somewhat accusatory and believes that she may die from heart attack or choking on food or shortness of breath or Koran 0 virus and continues to need frequent support and reassurance  She sometimes demands to get an echocardiogram and EKG and a cardiac consult despite no symptoms  She is attending some of the groups  She is now willing to accommodate the personal care home where she had gone for a tour and is hoping that they would make some changes with the furniture in her room to block part of the told been does in the assigned room as she is afraid of falling down into the parking lot which is right next to her room  Eats and sleeps well  Compliant with medications and no side effects  Review of systems unremarkable otherwise     Current medications:    Current Facility-Administered Medications:     acetaminophen (TYLENOL) tablet 325 mg, 325 mg, Oral, Q6H PRN, Zander Yanez MD    acetaminophen (TYLENOL) tablet 650 mg, 650 mg, Oral, Q6H PRN, Zander Yanez MD    acetaminophen (TYLENOL) tablet 650 mg, 650 mg, Oral, Q8H PRN, Zander Yanez MD    albuterol (PROVENTIL HFA,VENTOLIN HFA) inhaler 2 puff, 2 puff, Inhalation, Q4H PRN, Zander Yanez MD, 2 puff at 10/11/19 0424    aluminum-magnesium hydroxide-simethicone (MYLANTA) 200-200-20 mg/5 mL oral suspension 15 mL, 15 mL, Oral, Q4H PRN, Zander Yanez MD, 15 mL at 01/26/20 1021    ammonium lactate (LAC-HYDRIN) 12 % lotion 1 application, 1 application, Topical, BID PRN, Zander Yanez MD    benzonatate (TESSALON PERLES) capsule 100 mg, 100 mg, Oral, TID PRN, Zander Yanez MD    benztropine (COGENTIN) injection 1 mg, 1 mg, Intramuscular, Q8H PRN, Oscar Silva Israel Hughes MD    carbamide peroxide [facility]) 6 5 % otic solution 5 drop, 5 drop, Left Ear, BID PRN, Nestor Lancaster MD    cloZAPine (CLOZARIL) tablet 25 mg, 25 mg, Oral, BID, Teri Huggins MD, 25 mg at 03/25/20 2128    cloZAPine (CLOZARIL) tablet 50 mg, 50 mg, Oral, BID, Teri Huggins MD, 50 mg at 03/25/20 2127    docusate sodium (COLACE) capsule 100 mg, 100 mg, Oral, BID PRN, Teri Huggins MD    EPINEPHrine PF (ADRENALIN) 1 mg/mL injection 0 15 mg, 0 15 mg, Intramuscular, Once PRN, Teri Huggins MD    fluticasone-vilanterol (BREO ELLIPTA) 200-25 MCG/INH inhaler 1 puff, 1 puff, Inhalation, Daily, Teri Huggins MD, 1 puff at 03/25/20 0911    ketotifen (ZADITOR) 0 025 % ophthalmic solution 1 drop, 1 drop, Right Eye, BID PRN, Teri Huggins MD    levothyroxine tablet 125 mcg, 125 mcg, Oral, Early Morning, Teri Huggins MD, 125 mcg at 03/26/20 0606    magnesium hydroxide (MILK OF MAGNESIA) 400 mg/5 mL oral suspension 30 mL, 30 mL, Oral, Daily PRN, Teri Huggins MD    montelukast (SINGULAIR) tablet 10 mg, 10 mg, Oral, HS, Teri Huggins MD, 10 mg at 02/22/20 2104    OLANZapine (ZyPREXA) IM injection 5 mg, 5 mg, Intramuscular, Q8H PRN, Teri Huggins MD    OLANZapine (ZyPREXA) tablet 5 mg, 5 mg, Oral, Q8H PRN, Teri Huggins MD    ondansetron (ZOFRAN-ODT) dispersible tablet 4 mg, 4 mg, Oral, Q6H PRN, Teri Huggins MD, 4 mg at 12/09/19 1757    pantoprazole (PROTONIX) EC tablet 40 mg, 40 mg, Oral, Early Morning, Teri Huggins MD, 40 mg at 03/26/20 0606    polyethylene glycol (MIRALAX) packet 17 g, 17 g, Oral, Daily PRN, Teri Huggins MD    polyvinyl alcohol (LIQUIFILM TEARS) 1 4 % ophthalmic solution 1 drop, 1 drop, Both Eyes, Q3H PRN, Teri Huggins MD    sertraline (ZOLOFT) tablet 175 mg, 175 mg, Oral, Daily, Teri Huggins MD, 175 mg at 03/25/20 0904    sucralfate (CARAFATE) oral suspension 1,000 mg, 1,000 mg, Oral, BID, Cat Torres MD, 1,000 mg at 03/26/20 0606    theophylline (JEF-24) 24 hr capsule 200 mg, 200 mg, Oral, Daily, Greer Beltran MD, 200 mg at 03/25/20 0905    tiotropium [facility]) capsule for inhaler 18 mcg, 18 mcg, Inhalation, Daily, Greer Beltran MD, 18 mcg at 03/25/20 0910    traZODone (DESYREL) tablet 25 mg, 25 mg, Oral, HS PRN, Greer Beltran MD  Justification if on more than two antipsychotics: not applicable  Side effects if any: none    Risks , benefits, side effects and precautions of medications discussed with patient and reminded patient to let us know any problems with medications     Objective:     Vital Signs:  Vitals:    03/24/20 2005 03/25/20 0700 03/25/20 1700 03/25/20 2000   BP: 103/60 114/65 107/64 113/69   BP Location: Left arm Right arm Left arm Left arm   Pulse: 83 84 79 83   Resp: 16 18 16 17   Temp: (!) 97 3 °F (36 3 °C) (!) 97 2 °F (36 2 °C) (!) 97 1 °F (36 2 °C) (!) 97 °F (36 1 °C)   TempSrc: Temporal  Temporal Temporal   SpO2: 93% 92% 91% 93%   Weight:       Height:         Appearance:  age appropriate and older than stated age better groomed but with uncombed hair sitting on bed with good eye contact   Behavior:  evasive and guarded superficially friendly and pleasant but appears suspicious    Speech:  delayed, increased latency of response and tangential    Mood:  anxious, euphoric and irritable    Affect:  increased in intensity, increased in range, mood-congruent and redirectable   Thought Process:  circumstantial, concrete, goal directed, illogical and perserverative with tendency to have stilted speech   Thought Content:  delusions  grandiose and persecutory no overt delusions elicited but appears suspicious of staff tampering with her medication or her inhalant or the oxygen concentrator or off and on  Still psychosomatic about fear of death from multiple somatic complaints  No other obsessions or compulsions  No current suicidal homicidal thoughts intent or plans     Perceptual Disturbances: None    Risk Potential: Inability to care for herself and refusal to take showers regularly Sensorium:  person, place, time/date, situation, day of week, month of year, year and time   Cognition:  grossly intact with no deficit in recent or remote memory and not deficit in language, aware of current events   Consciousness:  alert and awake    Attention: Fair   Intellect: Average   Insight:  Limited   Judgment: fair       Motor Activity: no abnormal movements         Recent Labs:  Results Reviewed     None          I/O Past 24 hours:  No intake/output data recorded  No intake/output data recorded  Assessment / Plan:     Schizoaffective disorder, bipolar type ([address] )  Overall status:  improving    Reason for continued inpatient care: to assess ability to maintain improvement by attending to ADLs and taking showers regular and see how she does on outing with family after another outing with staff escort next week  Acceptance by patient: accepting now  Hopefulness in recovery: living at the [facility] soon  Understanding of medications :  yes  Involved in reintegration process: attending groups and has off grounds and off unit privileges   Trusting in relatoinship with psychiatrist:  Jeannie Rosario now  Overall status :  No changes  Recommended Treatment:    Medication changes:  1) none today    Non-pharmacological treatments  1) Continue with individual therapy, group therapy, milieu therapy and occupational therapy using recovery principles and psycho-education about accepting illness and need for treatment   2) encourage to take showers twice a week and cooperate with treatment and adl skills as per her behav  plan  3) another therapeutic pass with sister now that she did well  with the assigned staff escort to the park but it is now on hold due to corana virus  4) Prepare for the [facility] and encouraged to accept  rather than refuse it  Safety  1) Safety/communication plan established targeting dynamic risk factors above    Discharge Plan back to a [facility] / Coordination of Care    Total floor / unit time spent today 15 minutes  Greater than 50% of total time was spent with the patient and / or family counseling and / or coordination of care  Receptive to listening and learning coping skills for management of symptoms and attending to ADLs  Patient's Rights, confidentiality and exceptions to confidentiality, use of automated medical record, Jeb Patrick staff access to medical record, and consent to treatment reviewed      Carissa Willis MD

## 2020-03-26 NOTE — PLAN OF CARE
Problem: Alteration in Thoughts and Perception  Goal: Agree to be compliant with medication regime, as prescribed and report medication side effects  Description  Interventions:  - Offer appropriate PRN medication and supervise ingestion; conduct aims, as needed   Outcome: Progressing  Goal: Attend and participate in unit activities, including therapeutic, recreational, and educational groups  Description  Interventions:  - Provide therapeutic and educational activities daily, encourage attendance and participation, and document same in the medical record     CERTIFIED PEER SPECIALIST INTERVENTIONS:    Complete peer assessment with patient to assess their needs and identify their goals to complete while in the recovery program as well as once discharged into the community  Patient will complete WRAP Plan, Crisis Plan and 5 Life Domains  Patient will attend 50% of groups offered on the unit  Patient will complete a goal card weekly      Outcome: Progressing     Problem: Alteration in Thoughts and Perception  Goal: Complete daily ADLs, including personal hygiene independently, as able  Description  Interventions:  - Observe, teach, and assist patient with ADLS  - Monitor and promote a balance of rest/activity, with adequate nutrition and elimination   Outcome: Not Progressing     Attended IMR group, compliant with routine medications, anxiety controlled, gait steady, denied pain, refused to shower this morning will continue to monitor

## 2020-03-27 NOTE — TREATMENT TEAM
03/26/20 1100   Activity/Group Checklist   Group Exercise  (IMR)   Attendance Attended   Attendance Duration (min) 46-60   Interactions Did not interact   Affect/Mood Appropriate   Goals Achieved Able to listen to others

## 2020-03-27 NOTE — PLAN OF CARE
Problem: Alteration in Thoughts and Perception  Goal: Agree to be compliant with medication regime, as prescribed and report medication side effects  Description  Interventions:  - Offer appropriate PRN medication and supervise ingestion; conduct aims, as needed   Outcome: Progressing  Goal: Attend and participate in unit activities, including therapeutic, recreational, and educational groups  Description  Interventions:  - Provide therapeutic and educational activities daily, encourage attendance and participation, and document same in the medical record     CERTIFIED PEER SPECIALIST INTERVENTIONS:    Complete peer assessment with patient to assess their needs and identify their goals to complete while in the recovery program as well as once discharged into the community  Patient will complete WRAP Plan, Crisis Plan and 5 Life Domains  Patient will attend 50% of groups offered on the unit  Patient will complete a goal card weekly  Outcome: Progressing  Goal: Complete daily ADLs, including personal hygiene independently, as able  Description  Interventions:  - Observe, teach, and assist patient with ADLS  - Monitor and promote a balance of rest/activity, with adequate nutrition and elimination   Outcome: Not Jono Cancer has been intermittently visible on the unit, mainly isolative to her room for the majority of the shift but is able to make her needs known  No complaints voiced to this writer, denies anxiety and depression at this time  Reports feeling fatigued which she states is the cause of her staying in her room  Compliant with morning medications, appetite - 25% breakfast ( cream of wheat, yogurt and juice) 50% lunch ( pasta, pudding, juice and milk)  Behaviors controlled at this time  Did not tend to her hygiene this shift, disheveled in appearance  Will continue to monitor for changes

## 2020-03-27 NOTE — PROGRESS NOTES
03/27/20 0900   Team Meeting   Meeting Type Daily Rounds   Team Members Present   Team Members Present Physician;Nurse;; Other (Discipline and Name)   Physician Team Member Dr Chanell Martell, RN   Care Management Team Member Brenda Oliveros   Other (Discipline and Name) Estefania Miller LCSW     Patient attended [facility]  No behavioral changes  Slept

## 2020-03-27 NOTE — PROGRESS NOTES
Progress Note - Sen Garvey 1955, 60 y o  female MRN: [id number]    Unit/Bed#: [facility] 110-02 Encounter: [id number]    Primary Care Provider: Paulette Delgado PA-C   Date and time admitted to hospital: 7/23/2019  5:30 PM        * Schizoaffective disorder, bipolar type [facility])  Assessment & Plan  Patient is reporting no new problems but does not take showers other than twice a week and needs reminders to keep up with the same  She tenses stay back on her bed and does sit up when approached and continues to have uncombed long grayish hair  She is still psychosomatic and questions her pills and wants to make sure no one has tampered with her inhalant or her oxygen concentrator which is an ongoing thing  However she is compliant with medications but does not eat meals all the time but has not get lost  weight  He is sleeping well  He does not admit to any overt delusional believes even though she does come across as somewhat suspicious and paranoid and preoccupied  She is still worried that she might die from corona virus or from shortness of breath or from talking on food or from heart attack and continues to demand certain tests even if they are not warranted like asking for echocardiogram!   She is usually receptive to support and reassurance but she is constantly seeking the same  Tolerating medications with no side effects  Lab of from this morning is unremarkable for being on clozapine    Review of systems unremarkable    Current medications:    Current Facility-Administered Medications:     acetaminophen (TYLENOL) tablet 325 mg, 325 mg, Oral, Q6H PRN, Tree Madden MD    acetaminophen (TYLENOL) tablet 650 mg, 650 mg, Oral, Q6H PRN, Tree Madden MD    acetaminophen (TYLENOL) tablet 650 mg, 650 mg, Oral, Q8H PRN, Tree Madden MD    albuterol (PROVENTIL HFA,VENTOLIN HFA) inhaler 2 puff, 2 puff, Inhalation, Q4H PRN, Tree Madden MD, 2 puff at 10/11/19 0424    aluminum-magnesium hydroxide-simethicone (MYLANTA) 200-200-20 mg/5 mL oral suspension 15 mL, 15 mL, Oral, Q4H PRN, Shilpa Trujillo MD, 15 mL at 01/26/20 1021    ammonium lactate (LAC-HYDRIN) 12 % lotion 1 application, 1 application, Topical, BID PRN, Shilpa Trujillo MD    benzonatate (TESSALON PERLES) capsule 100 mg, 100 mg, Oral, TID PRN, Shilpa Trujillo MD    benztropine (COGENTIN) injection 1 mg, 1 mg, Intramuscular, Q8H PRN, Shilpa Trujillo MD    carbamide peroxide (DEBROX) 6 5 % otic solution 5 drop, 5 drop, Left Ear, BID PRN, Myles Patterson MD    cloZAPine (CLOZARIL) tablet 25 mg, 25 mg, Oral, BID, Shilpa Trujillo MD, 25 mg at 03/26/20 2053    cloZAPine (CLOZARIL) tablet 50 mg, 50 mg, Oral, BID, Shilpa Trujillo MD, 50 mg at 03/26/20 2053    docusate sodium (COLACE) capsule 100 mg, 100 mg, Oral, BID PRN, Shilpa Trujillo MD    EPINEPHrine PF (ADRENALIN) 1 mg/mL injection 0 15 mg, 0 15 mg, Intramuscular, Once PRN, Shilpa Trujillo MD    fluticasone-vilanterol (BREO ELLIPTA) 200-25 MCG/INH inhaler 1 puff, 1 puff, Inhalation, Daily, Shilpa Trujillo MD, 1 puff at 03/26/20 9140    ketotifen (ZADITOR) 0 025 % ophthalmic solution 1 drop, 1 drop, Right Eye, BID PRN, Shilpa Trujillo MD    levothyroxine tablet 125 mcg, 125 mcg, Oral, Early Morning, Shilpa Trujillo MD, 125 mcg at 03/27/20 0600    magnesium hydroxide (MILK OF MAGNESIA) 400 mg/5 mL oral suspension 30 mL, 30 mL, Oral, Daily PRN, Shilpa Trujillo MD    montelukast (SINGULAIR) tablet 10 mg, 10 mg, Oral, HS, Shilpa Trujillo MD, 10 mg at 02/22/20 2104    OLANZapine (ZyPREXA) IM injection 5 mg, 5 mg, Intramuscular, Q8H PRN, Shilpa Trujillo MD    OLANZapine (ZyPREXA) tablet 5 mg, 5 mg, Oral, Q8H PRN, Shilpa Trujillo MD    ondansetron (ZOFRAN-ODT) dispersible tablet 4 mg, 4 mg, Oral, Q6H PRN, Shilpa Trujillo MD, 4 mg at 12/09/19 1757    pantoprazole (PROTONIX) EC tablet 40 mg, 40 mg, Oral, Early Morning, Shilpa Trujillo MD, 40 mg at 03/27/20 0600    polyethylene glycol (MIRALAX) packet 17 g, 17 g, Oral, Daily PRN, Lazarus Derry Shani Lyles MD    polyvinyl alcohol (LIQUIFILM TEARS) 1 4 % ophthalmic solution 1 drop, 1 drop, Both Eyes, Q3H PRN, Isidoro Gonzalez MD    sertraline (ZOLOFT) tablet 175 mg, 175 mg, Oral, Daily, Isidoro Gonzalez MD, 175 mg at 03/26/20 2846    sucralfate (CARAFATE) oral suspension 1,000 mg, 1,000 mg, Oral, BID, Arin Parker MD, 1,000 mg at 03/27/20 0601    theophylline (JEF-24) 24 hr capsule 200 mg, 200 mg, Oral, Daily, Isidoro Gonzalez MD, 200 mg at 03/26/20 7065    tiotropium [facility]) capsule for inhaler 18 mcg, 18 mcg, Inhalation, Daily, Isidoro Gonzalez MD, 18 mcg at 03/26/20 6139    traZODone (DESYREL) tablet 25 mg, 25 mg, Oral, HS PRN, Isidoro Gonzalez MD  Justification if on more than two antipsychotics: not applicable  Side effects if any: none    Risks , benefits, side effects and precautions of medications discussed with patient and reminded patient to let us know any problems with medications     Objective:     Vital Signs:  Vitals:    03/26/20 1655 03/26/20 2020 03/26/20 2136 03/27/20 0725   BP: 98/71 92/63  126/71   BP Location: Left arm Left arm  Right arm   Pulse: 78 73 89 91   Resp: 18 16  18   Temp: 98 1 °F (36 7 °C) 97 5 °F (36 4 °C)  97 6 °F (36 4 °C)   TempSrc: Temporal Temporal  Temporal   SpO2: 94% 90% 93% 94%   Weight:       Height:         Appearance:  age appropriate and older than stated age better groomed with uncombed hair sitting on bed somewhat anxious   Behavior:  evasive and guarded superficially friendly pleasant anxious and suspicious at times    Speech:  delayed, increased latency of response and tangential    Mood:  anxious, euphoric and irritable    Affect:  increased in intensity, increased in range, mood-congruent and redirectable   Thought Process:  circumstantial, concrete, goal directed, illogical and perserverative tendency to have stilted speech   Thought Content:  delusions  grandiose and persecutory no overt delusions elicited but appears suspicious of staff tampering with her medications inhalant etc   Still psychosomatic about fear of death from corona virus heart attack etc needing frequent support and reassurance  No current suicidal homicidal thoughts intent or plans reported  No phobias obsessions or compulsions otherwise  Meenu Carlton Perceptual Disturbances: None    Risk Potential: Inability to care for herself and refusal to take showers regularly   Sensorium:  person, place, time/date, situation, day of week, month of year, year and time   Cognition:  grossly intact aware of current events like the corona virus, no deficit in language, no deficit in recent or remote memory   Consciousness:  alert and awake    Attention: Fair   Intellect: Average   Insight:  Limited   Judgment: fair       Motor Activity: no abnormal movements         Recent Labs:  Results Reviewed     None          I/O Past 24 hours:  No intake/output data recorded  No intake/output data recorded  Assessment / Plan:     Schizoaffective disorder, bipolar type ([facility] )  Overall status:  improving    Reason for continued inpatient care: to assess ability to maintain improvement by attending to ADLs and taking showers regular and see how she does on outing with family after another outing with staff escort next week  Acceptance by patient: accepting now  Hopefulness in recovery: living at the [facility] soon  Understanding of medications :  yes  Involved in reintegration process: attending groups and has off grounds and off unit privileges   Trusting in relatoinship with psychiatrist:  Ev Cosby now  Overall status :  No changes  Recommended Treatment:    Medication changes:  1) none today    Non-pharmacological treatments  1) Continue with individual therapy, group therapy, milieu therapy and occupational therapy using recovery principles and psycho-education about accepting illness and need for treatment     2) encourage to take showers twice a week and cooperate with treatment and adl skills as per her behav  plan  3) another therapeutic pass with sister now that she did well  with the assigned staff escort to the park but it is now on hold due to corana virus  4) Prepare for the [address] [place] [address] Rd and encouraged to accept  rather than refuse it  Safety  1) Safety/communication plan established targeting dynamic risk factors above  Discharge Plan back to a [facility] at [address] / Coordination of Care    Total floor / unit time spent today 15 minutes  Greater than 50% of total time was spent with the patient and / or family counseling and / or coordination of care  Receptive to listening and learning coping skills for management of symptoms and attending to ADLs  Patient's Rights, confidentiality and exceptions to confidentiality, use of automated medical record, [facility] to [facility] record, and consent to treatment reviewed      Alirio Frazier MD

## 2020-03-27 NOTE — ASSESSMENT & PLAN NOTE
Patient is reporting no new problems but does not take showers other than twice a week and needs reminders to keep up with the same  She tenses stay back on her bed and does sit up when approached and continues to have uncombed long grayish hair  She is still psychosomatic and questions her pills and wants to make sure no one has tampered with her inhalant or her oxygen concentrator which is an ongoing thing  However she is compliant with medications but does not eat meals all the time but has not get lost  weight  He is sleeping well  He does not admit to any overt delusional believes even though she does come across as somewhat suspicious and paranoid and preoccupied  She is still worried that she might die from corona virus or from shortness of breath or from talking on food or from heart attack and continues to demand certain tests even if they are not warranted like asking for echocardiogram!   She is usually receptive to support and reassurance but she is constantly seeking the same  Tolerating medications with no side effects  Lab of from this morning is unremarkable for being on clozapine    Review of systems unremarkable    Current medications:    Current Facility-Administered Medications:     acetaminophen (TYLENOL) tablet 325 mg, 325 mg, Oral, Q6H PRN, Zac Sierra MD    acetaminophen (TYLENOL) tablet 650 mg, 650 mg, Oral, Q6H PRN, Zac Sierra MD    acetaminophen (TYLENOL) tablet 650 mg, 650 mg, Oral, Q8H PRN, Zac Sierra MD    albuterol (PROVENTIL HFA,VENTOLIN HFA) inhaler 2 puff, 2 puff, Inhalation, Q4H PRN, Zac Sierra MD, 2 puff at 10/11/19 0424    aluminum-magnesium hydroxide-simethicone (MYLANTA) 200-200-20 mg/5 mL oral suspension 15 mL, 15 mL, Oral, Q4H PRN, Zac Sierra MD, 15 mL at 01/26/20 1021    ammonium lactate (LAC-HYDRIN) 12 % lotion 1 application, 1 application, Topical, BID PRN, Zac Sierra MD    benzonatate (TESSALON PERLES) capsule 100 mg, 100 mg, Oral, TID PRN, Davian Hubbard Paulina Berg MD    benztropine (COGENTIN) injection 1 mg, 1 mg, Intramuscular, Q8H PRN, Felisa Florence MD    carbamide peroxide (DEBROX) 6 5 % otic solution 5 drop, 5 drop, Left Ear, BID PRN, Jessica Pitts MD    cloZAPine (CLOZARIL) tablet 25 mg, 25 mg, Oral, BID, Felisa Florence MD, 25 mg at 03/26/20 2053    cloZAPine (CLOZARIL) tablet 50 mg, 50 mg, Oral, BID, Felisa Florence MD, 50 mg at 03/26/20 2053    docusate sodium (COLACE) capsule 100 mg, 100 mg, Oral, BID PRN, Felisa Florence MD    EPINEPHrine PF (ADRENALIN) 1 mg/mL injection 0 15 mg, 0 15 mg, Intramuscular, Once PRN, Felisa Florence MD    fluticasone-vilanterol (BREO ELLIPTA) 200-25 MCG/INH inhaler 1 puff, 1 puff, Inhalation, Daily, Felisa Florence MD, 1 puff at 03/26/20 3009    ketotifen (ZADITOR) 0 025 % ophthalmic solution 1 drop, 1 drop, Right Eye, BID PRN, Felisa Florence MD    levothyroxine tablet 125 mcg, 125 mcg, Oral, Early Morning, Felisa Florence MD, 125 mcg at 03/27/20 0600    magnesium hydroxide (MILK OF MAGNESIA) 400 mg/5 mL oral suspension 30 mL, 30 mL, Oral, Daily PRN, Felisa Florence MD    montelukast (SINGULAIR) tablet 10 mg, 10 mg, Oral, HS, Felisa Florence MD, 10 mg at 02/22/20 2104    OLANZapine (ZyPREXA) IM injection 5 mg, 5 mg, Intramuscular, Q8H PRN, Felisa Florence MD    OLANZapine (ZyPREXA) tablet 5 mg, 5 mg, Oral, Q8H PRN, Felisa Florence MD    ondansetron (ZOFRAN-ODT) dispersible tablet 4 mg, 4 mg, Oral, Q6H PRN, Felisa Florence MD, 4 mg at 12/09/19 1757    pantoprazole (PROTONIX) EC tablet 40 mg, 40 mg, Oral, Early Morning, Felisa Florence MD, 40 mg at 03/27/20 0600    polyethylene glycol (MIRALAX) packet 17 g, 17 g, Oral, Daily PRN, Felisa Florence MD    polyvinyl alcohol (LIQUIFILM TEARS) 1 4 % ophthalmic solution 1 drop, 1 drop, Both Eyes, Q3H PRN, Felisa Florence MD    sertraline (ZOLOFT) tablet 175 mg, 175 mg, Oral, Daily, Felisa Florence MD, 175 mg at 03/26/20 6715    sucralfate (CARAFATE) oral suspension 1,000 mg, 1,000 mg, Oral, BID, Jaiden Mcknight MD, 1,000 mg at 03/27/20 0601    theophylline (JEF-24) 24 hr capsule 200 mg, 200 mg, Oral, Daily, Mynor Terry MD, 200 mg at 03/26/20 1301    tiotropium [facility]) capsule for inhaler 18 mcg, 18 mcg, Inhalation, Daily, Mynor Terry MD, 18 mcg at 03/26/20 5739    traZODone (DESYREL) tablet 25 mg, 25 mg, Oral, HS PRN, Mynor Terry MD  Justification if on more than two antipsychotics: not applicable  Side effects if any: none    Risks , benefits, side effects and precautions of medications discussed with patient and reminded patient to let us know any problems with medications     Objective:     Vital Signs:  Vitals:    03/26/20 1655 03/26/20 2020 03/26/20 2136 03/27/20 0725   BP: 98/71 92/63  126/71   BP Location: Left arm Left arm  Right arm   Pulse: 78 73 89 91   Resp: 18 16  18   Temp: 98 1 °F (36 7 °C) 97 5 °F (36 4 °C)  97 6 °F (36 4 °C)   TempSrc: Temporal Temporal  Temporal   SpO2: 94% 90% 93% 94%   Weight:       Height:         Appearance:  age appropriate and older than stated age better groomed with uncombed hair sitting on bed somewhat anxious   Behavior:  evasive and guarded superficially friendly pleasant anxious and suspicious at times    Speech:  delayed, increased latency of response and tangential    Mood:  anxious, euphoric and irritable    Affect:  increased in intensity, increased in range, mood-congruent and redirectable   Thought Process:  circumstantial, concrete, goal directed, illogical and perserverative tendency to have stilted speech   Thought Content:  delusions  grandiose and persecutory no overt delusions elicited but appears suspicious of staff tampering with her medications inhalant etc   Still psychosomatic about fear of death from corona virus heart attack etc needing frequent support and reassurance  No current suicidal homicidal thoughts intent or plans reported  No phobias obsessions or compulsions otherwise  Linda Washington    Perceptual Disturbances: None    Risk Potential: Inability to care for herself and refusal to take showers regularly   Sensorium:  person, place, time/date, situation, day of week, month of year, year and time   Cognition:  grossly intact aware of current events like the corona virus, no deficit in language, no deficit in recent or remote memory   Consciousness:  alert and awake    Attention: Fair   Intellect: Average   Insight:  Limited   Judgment: fair       Motor Activity: no abnormal movements         Recent Labs:  Results Reviewed     None          I/O Past 24 hours:  No intake/output data recorded  No intake/output data recorded  Assessment / Plan:     Schizoaffective disorder, bipolar type ([facility] )  Overall status:  improving    Reason for continued inpatient care: to assess ability to maintain improvement by attending to ADLs and taking showers regular and see how she does on outing with family after another outing with staff escort next week  Acceptance by patient: accepting now  Hopefulness in recovery: living at the [facility] soon  Understanding of medications :  yes  Involved in reintegration process: attending groups and has off grounds and off unit privileges   Trusting in relatoinship with psychiatrist:  Macie Parish now  Overall status :  No changes  Recommended Treatment:    Medication changes:  1) none today    Non-pharmacological treatments  1) Continue with individual therapy, group therapy, milieu therapy and occupational therapy using recovery principles and psycho-education about accepting illness and need for treatment   2) encourage to take showers twice a week and cooperate with treatment and adl skills as per her behav  plan  3) another therapeutic pass with sister now that she did well  with the assigned staff escort to the park but it is now on hold due to corana virus  4) Prepare for the [facility] and encouraged to accept  rather than refuse it  Safety  1) Safety/communication plan established targeting dynamic risk factors above    Discharge Plan back to a [facility] [employer] / Coordination of Care    Total floor / unit time spent today 15 minutes  Greater than 50% of total time was spent with the patient and / or family counseling and / or coordination of care  Receptive to listening and learning coping skills for management of symptoms and attending to ADLs  Patient's Rights, confidentiality and exceptions to confidentiality, use of automated medical record, [address] staff access to medical record, and consent to treatment reviewed      Prakash Brewster MD

## 2020-03-28 NOTE — NURSING NOTE
Patient visible on unit at times, isolative to self  Disheveled, quiet, calm, cooperative upon approach  Med and meal compliant, offers no complaints  No group attendance this evening  Will continue to monitor

## 2020-03-28 NOTE — NURSING NOTE
Patient has been in bed asleep  No concerns expressed, no apparent distress noted  Remains on 1L O2 via nasal cannula  On routine checks, will continue to monitor

## 2020-03-28 NOTE — PLAN OF CARE
Problem: Alteration in Thoughts and Perception  Goal: Agree to be compliant with medication regime, as prescribed and report medication side effects  Description  Interventions:  - Offer appropriate PRN medication and supervise ingestion; conduct aims, as needed   Outcome: Progressing  Goal: Complete daily ADLs, including personal hygiene independently, as able  Description  Interventions:  - Observe, teach, and assist patient with ADLS  - Monitor and promote a balance of rest/activity, with adequate nutrition and elimination   Outcome: Not Progressing     Problem: Ineffective Coping  Goal: Participates in unit activities  Description  Interventions:  - Provide therapeutic environment   - Provide required programming   - Redirect inappropriate behaviors   Outcome: Not Progressing    Leeann Diego has been intermittently visible on the unit, mainly isolative to her room for the majority of the shift but is able to make her needs known  No complaints voiced to this writer, denies anxiety and depression at this time  Napped through the majority of the shift and did not attend groups  Compliant with morning medications, appetite - 25% breakfast, 100% of lunch  Behaviors controlled at this time  Did not tend to her hygiene this shift, disheveled in appearance  Will continue to monitor for changes

## 2020-03-28 NOTE — PROGRESS NOTES
Psychiatry Progress Note    Subjective: Interval History     The patient is sitting in bed this morning  She has multiple somatic complaints  She states that she feels like something is blocking her left ear  She states she cannot hear well  Patient then reports that she is feeling tired and is worried about the Coronavirus  Patient also then starts talking about her blood pressure  Patient has been sleeping well  Staff reports that she is isolative to herself  She is compliant with medication  Appetite has been fair  Patient offers no concerns today regarding her mood       Behavior over the last 24 hours:  unchanged  Sleep: normal  Appetite: normal  Medication side effects: No  ROS: no complaints    Current medications:    Current Facility-Administered Medications:     acetaminophen (TYLENOL) tablet 325 mg, 325 mg, Oral, Q6H PRN, Jerome Lopez MD    acetaminophen (TYLENOL) tablet 650 mg, 650 mg, Oral, Q6H PRN, Jerome Lopez MD    acetaminophen (TYLENOL) tablet 650 mg, 650 mg, Oral, Q8H PRN, Jerome Lopez MD    albuterol (PROVENTIL HFA,VENTOLIN HFA) inhaler 2 puff, 2 puff, Inhalation, Q4H PRN, Jerome Lopez MD, 2 puff at 10/11/19 0424    aluminum-magnesium hydroxide-simethicone (MYLANTA) 200-200-20 mg/5 mL oral suspension 15 mL, 15 mL, Oral, Q4H PRN, Jerome Lopez MD, 15 mL at 01/26/20 1021    ammonium lactate (LAC-HYDRIN) 12 % lotion 1 application, 1 application, Topical, BID PRN, Jerome Lopez MD    benzonatate (TESSALON PERLES) capsule 100 mg, 100 mg, Oral, TID PRN, Jerome Lopez MD    benztropine (COGENTIN) injection 1 mg, 1 mg, Intramuscular, Q8H PRN, Jerome Lopez MD    carbamide peroxide (DEBROX) 6 5 % otic solution 5 drop, 5 drop, Left Ear, BID PRN, Elysia Puente MD    cloZAPine (CLOZARIL) tablet 25 mg, 25 mg, Oral, BID, Jerome Lopez MD, 25 mg at 03/27/20 2123    cloZAPine (CLOZARIL) tablet 50 mg, 50 mg, Oral, BID, Jerome Lopez MD, 50 mg at 03/27/20 2123    docusate sodium (COLACE) capsule 100 mg, 100 mg, Oral, BID PRN, Prakash Brewster MD    EPINEPHrine PF (ADRENALIN) 1 mg/mL injection 0 15 mg, 0 15 mg, Intramuscular, Once PRN, Prakash Brewster MD    fluticasone-vilanterol (BREO ELLIPTA) 200-25 MCG/INH inhaler 1 puff, 1 puff, Inhalation, Daily, Prakash Brewster MD, 1 puff at 03/28/20 0810    ketotifen (ZADITOR) 0 025 % ophthalmic solution 1 drop, 1 drop, Right Eye, BID PRN, Prakash Brewster MD    levothyroxine tablet 125 mcg, 125 mcg, Oral, Early Morning, Prakash Brewster MD, 125 mcg at 03/28/20 7542    magnesium hydroxide (MILK OF MAGNESIA) 400 mg/5 mL oral suspension 30 mL, 30 mL, Oral, Daily PRN, Prakash Brewster MD    montelukast (SINGULAIR) tablet 10 mg, 10 mg, Oral, HS, Prakash Brewster MD, 10 mg at 02/22/20 2104    OLANZapine (ZyPREXA) IM injection 5 mg, 5 mg, Intramuscular, Q8H PRN, Prakash Brewster MD    OLANZapine (ZyPREXA) tablet 5 mg, 5 mg, Oral, Q8H PRN, Prakash Brewster MD    ondansetron (ZOFRAN-ODT) dispersible tablet 4 mg, 4 mg, Oral, Q6H PRN, Prakash Brewster MD, 4 mg at 12/09/19 1757    pantoprazole (PROTONIX) EC tablet 40 mg, 40 mg, Oral, Early Morning, Prakash Brewster MD, 40 mg at 03/28/20 0556    polyethylene glycol (MIRALAX) packet 17 g, 17 g, Oral, Daily PRN, Prakash Brewster MD    polyvinyl alcohol (LIQUIFILM TEARS) 1 4 % ophthalmic solution 1 drop, 1 drop, Both Eyes, Q3H PRN, Prakash Brewster MD    sertraline (ZOLOFT) tablet 175 mg, 175 mg, Oral, Daily, Prakash Brewster MD, 175 mg at 03/28/20 0810    sucralfate (CARAFATE) oral suspension 1,000 mg, 1,000 mg, Oral, BID, Cat Torres MD, 1,000 mg at 03/28/20 5538    theophylline (JEF-24) 24 hr capsule 200 mg, 200 mg, Oral, Daily, Prakash Brewster MD, 200 mg at 03/28/20 0810    tiotropium (SPIRIVA) capsule for inhaler 18 mcg, 18 mcg, Inhalation, Daily, Prakash Brewster MD, 18 mcg at 03/28/20 0810    traZODone (DESYREL) tablet 25 mg, 25 mg, Oral, HS PRN, Prakash Brewster MD    Current Problem List:    Patient Active Problem List   Diagnosis    [facility]is [facility] [address] [facility]    COPD with asthma [facility] [address] [facility]    Tobacco use disorder, continuous    Bipolar disorder (HCC)    Lactic acidosis    Hypokalemia    Hypomagnesemia    Compression fracture of L4 lumbar vertebra    Thoracic compression fracture (HCC)    Ventral hernia    Parapneumonic effusion    Acute on chronic respiratory failure with hypoxia (HCC)    Chronic respiratory failure (HCC)    Hypophosphatemia    Elevated MCV    Schizoaffective disorder, bipolar type (HCC)    Acquired hypothyroidism    Gastroesophageal reflux disease without esophagitis    Abnormal CT of the chest    Excessive cerumen in left ear canal    Lipoma of right upper extremity    Polydipsia    Localized swelling of both lower legs    Noncompliant with deep vein thrombosis (DVT) prophylaxis    Allergic conjunctivitis of right eye    At risk for aspiration       Problem list reviewed 03/28/20     Objective:     Vital Signs:  Vitals:    03/27/20 0725 03/27/20 1718 03/27/20 1942 03/28/20 0715   BP: 126/71 126/59 96/63 94/62   BP Location: Right arm Left arm Left arm Left arm   Pulse: 91 85 74 86   Resp: 18 16 16 20   Temp: 97 6 °F (36 4 °C) (!) 97 2 °F (36 2 °C) 97 7 °F (36 5 °C) (!) 97 1 °F (36 2 °C)   TempSrc: Temporal Temporal Temporal Temporal   SpO2: 94% 93% 96%    Weight:       Height:             Appearance:  age appropriate and casually dressed   Behavior:  guarded   Speech:  delayed   Mood:  anxious and irritable   Affect:  increased in intensity   Thought Process:  circumstantial and goal directed   Thought Content:  delusions  grandiose and persecutory   Perceptual Disturbances: None   Risk Potential: none   Sensorium:  person, place, situation and time   Cognition:  intact   Consciousness:  alert and awake    Attention: attention span and concentration were age appropriate   Intellect: average   Insight:  limited   Judgment: limited      Motor Activity: no abnormal movements       I/O Past 24 hours:  No intake/output data recorded    No intake/output data recorded  Labs:  Reviewed 03/28/20    Assessment / Plan:     Schizoaffective disorder, bipolar type ([facility] [address] )    Recommended Treatment:      Medication changes:  1) continue current medication regimen  Non-pharmacological treatments  1) Continue with group therapy, milieu therapy and occupational therapy  Safety  1) Safety/communication plan established targeting dynamic risk factors above  2) Risks, benefits, and possible side effects of medications explained to patient and patient verbalizes understanding  Counseling / Coordination of Care    Total floor / unit time spent today 20 minutes  Greater than 50% of total time was spent with the patient and / or family counseling and / or coordination of care  A description of the counseling / coordination of care  Patient's Rights, confidentiality and exceptions to confidentiality, use of automated medical record, [facility] [place] staff access to medical record, and consent to treatment reviewed      Nikos Saldaña PA-C

## 2020-03-29 NOTE — PLAN OF CARE
Problem: Alteration in Thoughts and Perception  Goal: Agree to be compliant with medication regime, as prescribed and report medication side effects  Description  Interventions:  - Offer appropriate PRN medication and supervise ingestion; conduct aims, as needed   Outcome: Progressing  Goal: Attend and participate in unit activities, including therapeutic, recreational, and educational groups  Description  Interventions:  - Provide therapeutic and educational activities daily, encourage attendance and participation, and document same in the medical record     CERTIFIED PEER SPECIALIST INTERVENTIONS:    Complete peer assessment with patient to assess their needs and identify their goals to complete while in the recovery program as well as once discharged into the community  Patient will complete WRAP Plan, Crisis Plan and 5 Life Domains  Patient will attend 50% of groups offered on the unit  Patient will complete a goal card weekly  Outcome: Not Progressing  Goal: Complete daily ADLs, including personal hygiene independently, as able  Description  Interventions:  - Observe, teach, and assist patient with ADLS  - Monitor and promote a balance of rest/activity, with adequate nutrition and elimination   Outcome: Jannette Cash Mcfarlane has been intermittently visible on the unit, mainly isolative to her room for the majority of the shift but is able to make her needs known  No complaints voiced to this writer, denies anxiety and depression at this time  Napped through the majority of the shift and did not attend groups  Compliant with morning medications, appetite - 25% breakfast, having yogurt and and oatmeal and refused lunch  Behaviors controlled at this time  Did not tend to her hygiene this shift, disheveled in appearance  Will continue to monitor for changes

## 2020-03-29 NOTE — PROGRESS NOTES
Psychiatry Progress Note    Subjective: Interval History     The patient continues to be isolative to her room  She continues to be somatic  Patient continues to be concerned about the Coronavirus  She has also mentioned again that her left ear feels somewhat clogged  Patient states that she does have some depression due to not being able to have visitors  She states she did not attend groups  She does report she slept  She has been compliant with medication  Patient states she wants to shower later on today  She denies hallucinations and suicidal ideation      Behavior over the last 24 hours:  unchanged  Sleep: normal  Appetite: normal  Medication side effects: No  ROS: no complaints    Current medications:    Current Facility-Administered Medications:     acetaminophen (TYLENOL) tablet 325 mg, 325 mg, Oral, Q6H PRN, Zander Yanez MD    acetaminophen (TYLENOL) tablet 650 mg, 650 mg, Oral, Q6H PRN, Zander Yanez MD    acetaminophen (TYLENOL) tablet 650 mg, 650 mg, Oral, Q8H PRN, Zander Yanez MD    albuterol (PROVENTIL HFA,VENTOLIN HFA) inhaler 2 puff, 2 puff, Inhalation, Q4H PRN, Zander Yanez MD, 2 puff at 10/11/19 0424    aluminum-magnesium hydroxide-simethicone (MYLANTA) 200-200-20 mg/5 mL oral suspension 15 mL, 15 mL, Oral, Q4H PRN, Zander Yanez MD, 15 mL at 01/26/20 1021    ammonium lactate (LAC-HYDRIN) 12 % lotion 1 application, 1 application, Topical, BID PRN, Zander Yanez MD    benzonatate (TESSALON PERLES) capsule 100 mg, 100 mg, Oral, TID PRN, Zander Yanez MD    benztropine (COGENTIN) injection 1 mg, 1 mg, Intramuscular, Q8H PRN, Zander Yanez MD    carbamide peroxide (DEBROX) 6 5 % otic solution 5 drop, 5 drop, Left Ear, BID PRN, Wanda Barba MD    cloZAPine (CLOZARIL) tablet 25 mg, 25 mg, Oral, BID, Zander Yanez MD, 25 mg at 03/28/20 2145    cloZAPine (CLOZARIL) tablet 50 mg, 50 mg, Oral, BID, Zander Yanez MD, 50 mg at 03/28/20 2145    docusate sodium (COLACE) capsule 100 mg, 100 mg, Oral, BID PRN, Prakash Brewster MD    EPINEPHrine PF (ADRENALIN) 1 mg/mL injection 0 15 mg, 0 15 mg, Intramuscular, Once PRN, Prakash Brewster MD    fluticasone-vilanterol (BREO ELLIPTA) 200-25 MCG/INH inhaler 1 puff, 1 puff, Inhalation, Daily, Prakash Brewster MD, 1 puff at 03/29/20 0829    ketotifen (ZADITOR) 0 025 % ophthalmic solution 1 drop, 1 drop, Right Eye, BID PRN, Prakash Brewster MD    levothyroxine tablet 125 mcg, 125 mcg, Oral, Early Morning, Prakash Brewster MD, 125 mcg at 03/29/20 0602    magnesium hydroxide (MILK OF MAGNESIA) 400 mg/5 mL oral suspension 30 mL, 30 mL, Oral, Daily PRN, Prakash Brewster MD    montelukast (SINGULAIR) tablet 10 mg, 10 mg, Oral, HS, Prakash Brewster MD, 10 mg at 02/22/20 2104    OLANZapine (ZyPREXA) IM injection 5 mg, 5 mg, Intramuscular, Q8H PRN, Prakash Brewster MD    OLANZapine (ZyPREXA) tablet 5 mg, 5 mg, Oral, Q8H PRN, Prakash Brewster MD    ondansetron (ZOFRAN-ODT) dispersible tablet 4 mg, 4 mg, Oral, Q6H PRN, Prakash Brewster MD, 4 mg at 12/09/19 1757    pantoprazole (PROTONIX) EC tablet 40 mg, 40 mg, Oral, Early Morning, Prakash Brewster MD, 40 mg at 03/29/20 0602    polyethylene glycol (MIRALAX) packet 17 g, 17 g, Oral, Daily PRN, Prakash Brewster MD    polyvinyl alcohol (LIQUIFILM TEARS) 1 4 % ophthalmic solution 1 drop, 1 drop, Both Eyes, Q3H PRN, Prakash Brewster MD    sertraline (ZOLOFT) tablet 175 mg, 175 mg, Oral, Daily, Prakash Brewster MD, 175 mg at 03/29/20 5915    sucralfate (CARAFATE) oral suspension 1,000 mg, 1,000 mg, Oral, BID, Cat Torres MD, 1,000 mg at 03/29/20 0602    theophylline (JEF-24) 24 hr capsule 200 mg, 200 mg, Oral, Daily, Prakash Brewster MD, 200 mg at 03/29/20 0830    tiotropium (SPIRIVA) capsule for inhaler 18 mcg, 18 mcg, Inhalation, Daily, Prakash Brewster MD, 18 mcg at 03/29/20 4067    traZODone (DESYREL) tablet 25 mg, 25 mg, Oral, HS PRN, Prakash Brewster MD    Current Problem List:    Patient Active Problem List   Diagnosis    [facility]epsis ([facility] )    COPD with asthma ([facility] )    Tobacco use disorder, continuous    Bipolar disorder (HCC)    Lactic acidosis    Hypokalemia    Hypomagnesemia    Compression fracture of L4 lumbar vertebra    Thoracic compression fracture (HCC)    Ventral hernia    Parapneumonic effusion    Acute on chronic respiratory failure with hypoxia (HCC)    Chronic respiratory failure (HCC)    Hypophosphatemia    Elevated MCV    Schizoaffective disorder, bipolar type (HCC)    Acquired hypothyroidism    Gastroesophageal reflux disease without esophagitis    Abnormal CT of the chest    Excessive cerumen in left ear canal    Lipoma of right upper extremity    Polydipsia    Localized swelling of both lower legs    Noncompliant with deep vein thrombosis (DVT) prophylaxis    Allergic conjunctivitis of right eye    At risk for aspiration       Problem list reviewed 03/29/20     Objective:     Vital Signs:  Vitals:    03/28/20 0715 03/28/20 1700 03/28/20 2027 03/29/20 0700   BP: 94/62 101/64 100/70 95/62   BP Location: Left arm Left arm Left arm Left arm   Pulse: 86 75 79 72   Resp: 20 16 16 18   Temp: (!) 97 1 °F (36 2 °C) (!) 97 3 °F (36 3 °C) (!) 97 2 °F (36 2 °C) 98 3 °F (36 8 °C)   TempSrc: Temporal Temporal Temporal Temporal   SpO2:  91% 94% 90%   Weight:    66 kg (145 lb 6 4 oz)   Height:             Appearance:  age appropriate and casually dressed   Behavior:  guarded   Speech:  delayed   Mood:  anxious and irritable   Affect:  increased in intensity   Thought Process:  circumstantial and goal directed   Thought Content:  delusions  grandiose and persecutory   Perceptual Disturbances: None   Risk Potential: none   Sensorium:  person, place, situation and time   Cognition:  intact   Consciousness:  alert and awake    Attention: attention span and concentration were age appropriate   Intellect: average   Insight:  limited   Judgment: limited      Motor Activity: no abnormal movements       I/O Past 24 hours:  No intake/output data recorded    No intake/output data recorded  Labs:  Reviewed 03/29/20    Assessment / Plan:     Schizoaffective disorder, bipolar type ([facility] [address] )    Recommended Treatment:      Medication changes:  1) continue current medication regimen  Non-pharmacological treatments  1) Continue with group therapy, milieu therapy and occupational therapy  Safety  1) Safety/communication plan established targeting dynamic risk factors above  2) Risks, benefits, and possible side effects of medications explained to patient and patient verbalizes understanding  Counseling / Coordination of Care    Total floor / unit time spent today 20 minutes  Greater than 50% of total time was spent with the patient and / or family counseling and / or coordination of care  A description of the counseling / coordination of care  Patient's Rights, confidentiality and exceptions to confidentiality, use of automated medical record, [facility] [place] [facility] staff access to medical record, and consent to treatment reviewed      Oma Ochoa PA-C

## 2020-03-29 NOTE — PROGRESS NOTES
Sleeping at this time, O2 on at 1L NC, no distress noted  Safe precautions in place and maintained   Monitoring continues

## 2020-03-29 NOTE — PLAN OF CARE
Problem: Alteration in Thoughts and Perception  Goal: Agree to be compliant with medication regime, as prescribed and report medication side effects  Description  Interventions:  - Offer appropriate PRN medication and supervise ingestion; conduct aims, as needed   Outcome: Progressing     Problem: Nutrition/Hydration-ADULT  Goal: Nutrient/Hydration intake appropriate for improving, restoring or maintaining nutritional needs  Description  Monitor and assess patient's nutrition/hydration status for malnutrition  Collaborate with interdisciplinary team and initiate plan and interventions as ordered  Monitor patient's weight and dietary intake as ordered or per policy  Utilize nutrition screening tool and intervene as necessary  Determine patient's food preferences and provide high-protein, high-caloric foods as appropriate       INTERVENTIONS:  - Monitor oral intake, urinary output, labs, and treatment plans  - Assess nutrition and hydration status and recommend course of action  - Evaluate amount of meals eaten  - Assist patient with eating if necessary   - Allow adequate time for meals  - Recommend/ encourage appropriate diets, oral nutritional supplements, and vitamin/mineral supplements  - Order, calculate, and assess calorie counts as needed  - Recommend, monitor, and adjust tube feedings and TPN/PPN based on assessed needs  - Assess need for intravenous fluids  - Provide specific nutrition/hydration education as appropriate  - Include patient/family/caregiver in decisions related to nutrition  Outcome: Progressing     Problem: Alteration in Thoughts and Perception  Goal: Attend and participate in unit activities, including therapeutic, recreational, and educational groups  Description  Interventions:  - Provide therapeutic and educational activities daily, encourage attendance and participation, and document same in the medical record     [address] :    Complete peer assessment with patient to assess their needs and identify their goals to complete while in the recovery program as well as once discharged into the community  Patient will complete WRAP Plan, Crisis Plan and 5 Life Domains  Patient will attend 50% of groups offered on the unit  Patient will complete a goal card weekly  Outcome: Not Progressing  Goal: Complete daily ADLs, including personal hygiene independently, as able  Description  Interventions:  - Observe, teach, and assist patient with ADLS  - Monitor and promote a balance of rest/activity, with adequate nutrition and elimination   Outcome: Not Progressing     Problem: Depression  Goal: Refrain from isolation  Description  Interventions:  - Develop a trusting relationship   - Encourage socialization   Outcome: Not Progressing     [address] has been isolative to her room & bed throughout shift w/exception of medicines (except Singulair), meal (ate 100%), & HS snack  Describes self as feeling depressed, but, can't pinpoint any specific causative factor  "A lot of things " But, did not elaborate  Declined Incentive Spirometer, "Not tonight"  Did not attend to hygiene, though, says she will tomorrow  Did not attend Movie Social  She is pleasant on approach, & will socialize when out of room as at meal table  Wearing now her QHS humidified nasal O2 @ 1L for bed

## 2020-03-30 NOTE — PLAN OF CARE
Problem: Alteration in Thoughts and Perception  Goal: Verbalize thoughts and feelings  Description  Interventions:  - Promote a nonjudgmental and trusting relationship with the patient through active listening and therapeutic communication  - Assess patient's level of functioning, behavior and potential for risk  - Engage patient in 1 on 1 interactions for a minimum of 15 minutes each session  - Encourage patient to express fears, feelings, frustrations, and discuss symptoms    - [place] patient to reality, help patient recognize reality-based thinking   - Administer medications as ordered and assess for potential side effects  - Provide the patient education related to the signs and symptoms of the illness and desired effects of prescribed medications  Outcome: Progressing  Goal: Agree to be compliant with medication regime, as prescribed and report medication side effects  Description  Interventions:  - Offer appropriate PRN medication and supervise ingestion; conduct aims, as needed   Outcome: Progressing  Goal: Attend and participate in unit activities, including therapeutic, recreational, and educational groups  Description  Interventions:  - Provide therapeutic and educational activities daily, encourage attendance and participation, and document same in the medical record     CERTIFIED PEER SPECIALIST INTERVENTIONS:    Complete peer assessment with patient to assess their needs and identify their goals to complete while in the recovery program as well as once discharged into the community  Patient will complete WRAP Plan, Crisis Plan and 5 Life Domains  Patient will attend 50% of groups offered on the unit  Patient will complete a goal card weekly      Outcome: Progressing  Goal: Complete daily ADLs, including personal hygiene independently, as able  Description  Interventions:  - Observe, teach, and assist patient with ADLS  - Monitor and promote a balance of rest/activity, with adequate nutrition and elimination   Outcome: Progressing     Problem: Depression  Goal: Refrain from isolation  Description  Interventions:  - Develop a trusting relationship   - Encourage socialization   Outcome: Not Progressing     Problem: Nutrition/Hydration-ADULT  Goal: Nutrient/Hydration intake appropriate for improving, restoring or maintaining nutritional needs  Description  Monitor and assess patient's nutrition/hydration status for malnutrition  Collaborate with interdisciplinary team and initiate plan and interventions as ordered  Monitor patient's weight and dietary intake as ordered or per policy  Utilize nutrition screening tool and intervene as necessary  Determine patient's food preferences and provide high-protein, high-caloric foods as appropriate  INTERVENTIONS:  - Monitor oral intake, urinary output, labs, and treatment plans  - Assess nutrition and hydration status and recommend course of action  - Evaluate amount of meals eaten  - Assist patient with eating if necessary   - Allow adequate time for meals  - Recommend/ encourage appropriate diets, oral nutritional supplements, and vitamin/mineral supplements  - Order, calculate, and assess calorie counts as needed  - Recommend, monitor, and adjust tube feedings and TPN/PPN based on assessed needs  - Assess need for intravenous fluids  - Provide specific nutrition/hydration education as appropriate  - Include patient/family/caregiver in decisions related to nutrition  Outcome: Not Progressing     2010 Danie Steiner was motivated to do her shower w/setup by staff & assistance w/her hair, pleased w/her success  She came up for supper medicine, ate 50% of meal w/an Ensure  Has c/o feeling very fatigued today, but, "good tired", just wanting sleep  In free time has been isolating in bed asleep  Is taking part now in PM Group

## 2020-03-30 NOTE — TREATMENT TEAM
Patient declined      03/30/20 0900   Activity/Group Checklist   Group Community meeting   Attendance Did not attend   Attendance Duration (min) 16-30   Affect/Mood IRMA

## 2020-03-30 NOTE — ASSESSMENT & PLAN NOTE
Patient is reporting no new problems but does not take showers other than twice a week and needs reminders to keep up with the same  She tenses stay back on her bed and does sit up when approached and continues to have uncombed long grayish hair  She is still psychosomatic and questions her pills and wants to make sure no one has tampered with her inhalant or her oxygen concentrator which is an ongoing thing  However she is compliant with medications but does not eat meals all the time but has not get lost  weight  He is sleeping well  He does not admit to any overt delusional believes even though she does come across as somewhat suspicious and paranoid and preoccupied  She is still worried that she might die from corona virus or from shortness of breath or from talking on food or from heart attack and continues to demand certain tests even if they are not warranted like asking for echocardiogram!   She is usually receptive to support and reassurance but she is constantly seeking the same  Tolerating medications with no side effects  Lab of from this morning is unremarkable for being on clozapine    Review of systems unremarkable    Current medications:    Current Facility-Administered Medications:     acetaminophen (TYLENOL) tablet 325 mg, 325 mg, Oral, Q6H PRN, Jeffrey Gallegos MD    acetaminophen (TYLENOL) tablet 650 mg, 650 mg, Oral, Q6H PRN, Jeffrey Gallegos MD    acetaminophen (TYLENOL) tablet 650 mg, 650 mg, Oral, Q8H PRN, Jeffrey Gallegos MD    albuterol (PROVENTIL HFA,VENTOLIN HFA) inhaler 2 puff, 2 puff, Inhalation, Q4H PRN, Jeffrey Gallegos MD, 2 puff at 10/11/19 0424    aluminum-magnesium hydroxide-simethicone (MYLANTA) 200-200-20 mg/5 mL oral suspension 15 mL, 15 mL, Oral, Q4H PRN, Jeffrey Gallegos MD, 15 mL at 01/26/20 1021    ammonium lactate (LAC-HYDRIN) 12 % lotion 1 application, 1 application, Topical, BID PRN, Jeffrey Gallegos MD    benzonatate (TESSALON PERLES) capsule 100 mg, 100 mg, Oral, TID PRN, Renu Choi Kari Carranza MD    benztropine (COGENTIN) injection 1 mg, 1 mg, Intramuscular, Q8H PRN, Tree Madden MD    carbamide peroxide (DEBROX) 6 5 % otic solution 5 drop, 5 drop, Left Ear, BID PRN, Isauro Lopez MD    cloZAPine (CLOZARIL) tablet 25 mg, 25 mg, Oral, BID, Tree Madden MD, 25 mg at 03/29/20 2128    cloZAPine (CLOZARIL) tablet 50 mg, 50 mg, Oral, BID, Tree Madden MD, 50 mg at 03/29/20 2128    docusate sodium (COLACE) capsule 100 mg, 100 mg, Oral, BID PRN, Tree Madden MD    EPINEPHrine PF (ADRENALIN) 1 mg/mL injection 0 15 mg, 0 15 mg, Intramuscular, Once PRN, Tree Madden MD    fluticasone-vilanterol (BREO ELLIPTA) 200-25 MCG/INH inhaler 1 puff, 1 puff, Inhalation, Daily, Tree Madden MD, 1 puff at 03/30/20 0837    ketotifen (ZADITOR) 0 025 % ophthalmic solution 1 drop, 1 drop, Right Eye, BID PRN, Tree Madden MD    levothyroxine tablet 125 mcg, 125 mcg, Oral, Early Morning, Tree Madden MD, 125 mcg at 03/30/20 0603    magnesium hydroxide (MILK OF MAGNESIA) 400 mg/5 mL oral suspension 30 mL, 30 mL, Oral, Daily PRN, Tree Madden MD    montelukast (SINGULAIR) tablet 10 mg, 10 mg, Oral, HS, Tree Madden MD, 10 mg at 02/22/20 2104    OLANZapine (ZyPREXA) IM injection 5 mg, 5 mg, Intramuscular, Q8H PRN, Tree Madden MD    OLANZapine (ZyPREXA) tablet 5 mg, 5 mg, Oral, Q8H PRN, Tree Madden MD    ondansetron (ZOFRAN-ODT) dispersible tablet 4 mg, 4 mg, Oral, Q6H PRN, Tree Madden MD, 4 mg at 12/09/19 1757    pantoprazole (PROTONIX) EC tablet 40 mg, 40 mg, Oral, Early Morning, Tree Madden MD, 40 mg at 03/30/20 0603    polyethylene glycol (MIRALAX) packet 17 g, 17 g, Oral, Daily PRN, Tree Madden MD    polyvinyl alcohol (LIQUIFILM TEARS) 1 4 % ophthalmic solution 1 drop, 1 drop, Both Eyes, Q3H PRN, Tree Madden MD    sertraline (ZOLOFT) tablet 175 mg, 175 mg, Oral, Daily, Tree Madden MD, 175 mg at 03/30/20 0837    sucralfate (CARAFATE) oral suspension 1,000 mg, 1,000 mg, Oral, BID, Sachin Mahoney MD, 1,000 mg at 03/30/20 0602    theophylline (JEF-24) 24 hr capsule 200 mg, 200 mg, Oral, Daily, Sindy Day MD, 200 mg at 03/30/20 1555    tiotropium [facility]) capsule for inhaler 18 mcg, 18 mcg, Inhalation, Daily, Sindy Day MD, 18 mcg at 03/30/20 7114    traZODone (DESYREL) tablet 25 mg, 25 mg, Oral, HS PRN, Sindy Day MD  Justification if on more than two antipsychotics: not applicable  Side effects if any: none    Risks , benefits, side effects and precautions of medications discussed with patient and reminded patient to let us know any problems with medications     Objective:     Vital Signs:  Vitals:    03/29/20 1620 03/29/20 2015 03/30/20 0700 03/30/20 0705   BP: 97/66 92/64 98/61 91/59   BP Location: Right arm Left arm Left arm Left arm   Pulse: 78 59 73 67   Resp: 18 16 18 18   Temp: 98 2 °F (36 8 °C) 98 2 °F (36 8 °C) 97 7 °F (36 5 °C)    TempSrc: Temporal Temporal Temporal    SpO2: 93% 92%  95%   Weight:       Height:         Appearance:  age appropriate and older than stated age better groomed with uncombed hair sitting on bed somewhat anxious   Behavior:  evasive and guarded superficially friendly pleasant anxious and suspicious at times    Speech:  delayed, increased latency of response and tangential    Mood:  anxious, euphoric and irritable    Affect:  increased in intensity, increased in range, mood-congruent and redirectable   Thought Process:  circumstantial, concrete, goal directed, illogical and perserverative tendency to have stilted speech   Thought Content:  delusions  grandiose and persecutory no overt delusions elicited but appears suspicious of staff tampering with her medications inhalant etc   Still psychosomatic about fear of death from corona virus heart attack etc needing frequent support and reassurance  No current suicidal homicidal thoughts intent or plans reported  No phobias obsessions or compulsions otherwise  Monia Le    Perceptual Disturbances: None    Risk Potential: Inability to care for herself and refusal to take showers regularly   Sensorium:  person, place, time/date, situation, day of week, month of year, year and time   Cognition:  grossly intact aware of current events like the corona virus, no deficit in language, no deficit in recent or remote memory   Consciousness:  alert and awake    Attention: Fair   Intellect: Average   Insight:  Limited   Judgment: fair       Motor Activity: no abnormal movements         Recent Labs:  Results Reviewed     None          I/O Past 24 hours:  No intake/output data recorded  No intake/output data recorded  Assessment / Plan:     Schizoaffective disorder, bipolar type ([facility] [address] )  Overall status:  improving    Reason for continued inpatient care: to assess ability to maintain improvement by attending to ADLs and taking showers regular and see how she does on outing with family after another outing with staff escort next week  Acceptance by patient: accepting now  Hopefulness in recovery: living at the [facility] soon  Understanding of medications :  yes  Involved in reintegration process: attending groups and has off grounds and off unit privileges   Trusting in relatoinship with psychiatrist:  Antoine Ibarra now  Overall status :  No changes  Recommended Treatment:    Medication changes:  1) none today    Non-pharmacological treatments  1) Continue with individual therapy, group therapy, milieu therapy and occupational therapy using recovery principles and psycho-education about accepting illness and need for treatment     2) encourage to take showers twice a week and cooperate with treatment and adl skills as per her behav  plan  3) another therapeutic pass with sister now that she did well  with the assigned staff escort to the park but it is now on hold due to corana virus  4) Prepare for the [facility] and encouraged to accept  rather than refuse it  Safety  1) Safety/communication plan established targeting dynamic risk factors above   Discharge Plan back to a [facility] at [address] / Coordination of Care    Total floor / unit time spent today 15 minutes  Greater than 50% of total time was spent with the patient and / or family counseling and / or coordination of care  Receptive to listening and learning coping skills for management of symptoms and attending to ADLs  Patient's Rights, confidentiality and exceptions to confidentiality, use of automated medical record, [address] [place] [address] staff access to medical record, and consent to treatment reviewed      Allie Guzman MD

## 2020-03-30 NOTE — PROGRESS NOTES
Sleeping, no distress noted  O2 on at 1L  No distress noted  Safe and fall precautions in place and maintained   Monitoring continues

## 2020-03-30 NOTE — PROGRESS NOTES
03/27/20 1100   Activity/Group Checklist   Group Other (Comment)  (IMR)   Attendance Attended   Attendance Duration (min) 31-45   Interactions Interacted appropriately   Affect/Mood Appropriate   Goals Achieved Identified feelings; Discussed coping strategies; Displayed empathy;Able to listen to others; Able to engage in interactions; Able to reflect/comment on own behavior;Able to self-disclose; Able to recieve feedback     Patient attended [facility] featuring weekly goal review, review of changes to new group calendar, and DERS assessment  Patient completed the [facility] assessment  Patient reported that she has been trying to attend groups and shower regularly as she has become accustomed to doing  Patient also brought her goal card to group for the first time and was acknowledged for same  Patient participated, and was respectful of peers

## 2020-03-30 NOTE — PROGRESS NOTES
03/30/20 0800   Team Meeting   Meeting Type Daily Rounds   Team Members Present   Team Members Present Physician;Nurse; Other (Discipline and Name)   Physician Team Member Dr Cesario Patterson Team Member Jenniffer Bustamante RN   Other (Discipline and Name) Samson Staples LCSW; Jasiel Adame, CPS     No changes in behaviors

## 2020-03-30 NOTE — PLAN OF CARE
Problem: Alteration in Thoughts and Perception  Goal: Verbalize thoughts and feelings  Description  Interventions:  - Promote a nonjudgmental and trusting relationship with the patient through active listening and therapeutic communication  - Assess patient's level of functioning, behavior and potential for risk  - Engage patient in 1 on 1 interactions for a minimum of 15 minutes each session  - Encourage patient to express fears, feelings, frustrations, and discuss symptoms    - Licking patient to reality, help patient recognize reality-based thinking   - Administer medications as ordered and assess for potential side effects  - Provide the patient education related to the signs and symptoms of the illness and desired effects of prescribed medications  Outcome: Progressing  Goal: Agree to be compliant with medication regime, as prescribed and report medication side effects  Description  Interventions:  - Offer appropriate PRN medication and supervise ingestion; conduct aims, as needed   Outcome: Progressing     Problem: Ineffective Coping  Goal: Patient/Family verbalizes awareness of resources  Outcome: Progressing  Goal: Understands least restrictive measures  Description  Interventions:  - Utilize least restrictive behavior  Outcome: Progressing     Problem: Risk for Self Injury/Neglect  Goal: Treatment Goal: Remain safe during length of stay, learn and adopt new coping skills, and be free of self-injurious ideation, impulses and acts at the time of discharge  Outcome: Progressing  Goal: Verbalize thoughts and feelings  Description  Interventions:  - Assess and re-assess patient's lethality and potential for self-injury  - Engage patient in 1:1 interactions, daily, for a minimum of 15 minutes  - Encourage patient to express feelings, fears, frustrations, hopes  - Establish rapport/trust with patient   Outcome: Progressing  Goal: Refrain from harming self  Description  Interventions:  - Monitor patient closely, per order  - Develop a trusting relationship  - Supervise medication ingestion, monitor effects and side effects   Outcome: Progressing     Problem: Depression  Goal: Verbalize thoughts and feelings  Description  Interventions:  - Assess and re-assess patient's level of risk   - Engage patient in 1:1 interactions, daily, for a minimum of 15 minutes   - Encourage patient to express feelings, fears, frustrations, hopes   Outcome: Progressing     Problem: Alteration in Orientation  Goal: Interact with staff daily  Description  Interventions:  - Assess and re-assess patient's level of orientation  - Engage patient in 1 on 1 interactions, daily, for a minimum of 15 minutes   - Establish rapport/trust with patient   Outcome: Progressing     Problem: PAIN - ADULT  Goal: Verbalizes/displays adequate comfort level or baseline comfort level  Description  Interventions:  - Encourage patient to monitor pain and request assistance  - Assess pain using appropriate pain scale  - Administer analgesics based on type and severity of pain and evaluate response  - Implement non-pharmacological measures as appropriate and evaluate response  - Consider cultural and social influences on pain and pain management  - Notify physician/advanced practitioner if interventions unsuccessful or patient reports new pain  Outcome: Progressing     Problem: SAFETY ADULT  Goal: Patient will remain free of falls  Description  INTERVENTIONS:  - Assess patient frequently for physical needs  -  Identify cognitive and physical deficits and behaviors that affect risk of falls    -  [place] fall precautions as indicated by assessment   - Educate patient/family on patient safety including physical limitations  - Instruct patient to call for assistance with activity based on assessment  - Modify environment to reduce risk of injury  - Consider OT/PT consult to assist with strengthening/mobility  Outcome: Progressing     Problem: Alteration in Thoughts and Perception  Goal: Treatment Goal: Gain control of psychotic behaviors/thinking, reduce/eliminate presenting symptoms and demonstrate improved reality functioning upon discharge  Outcome: Not Progressing  Goal: Attend and participate in unit activities, including therapeutic, recreational, and educational groups  Description  Interventions:  - Provide therapeutic and educational activities daily, encourage attendance and participation, and document same in the medical record     CERTIFIED PEER SPECIALIST INTERVENTIONS:    Complete peer assessment with patient to assess their needs and identify their goals to complete while in the recovery program as well as once discharged into the community  Patient will complete WRAP Plan, Crisis Plan and [employer]  Patient will attend 50% of groups offered on the unit  Patient will complete a goal card weekly      Outcome: Not Progressing  Goal: Recognize dysfunctional thoughts, communicate reality-based thoughts at the time of discharge  Description  Interventions:  - Provide medication and psycho-education to assist patient in compliance and developing insight into his/her illness   Outcome: Not Progressing     Problem: Ineffective Coping  Goal: Identifies ineffective coping skills  Outcome: Not Progressing  Goal: Identifies healthy coping skills  Outcome: Not Progressing  Goal: Demonstrates healthy coping skills  Outcome: Not Progressing  Goal: Participates in unit activities  Description  Interventions:  - Provide therapeutic environment   - Provide required programming   - Redirect inappropriate behaviors   Outcome: Not Progressing     Problem: Risk for Self Injury/Neglect  Goal: Attend and participate in unit activities, including therapeutic, recreational, and educational groups  Description  Interventions:  - Provide therapeutic and educational activities daily, encourage attendance and participation, and document same in the medical record  - Obtain collateral information, encourage visitation and family involvement in care   Outcome: Not Progressing  Goal: Recognize maladaptive responses and adopt new coping mechanisms  Outcome: Not Progressing     Problem: Depression  Goal: Treatment Goal: Demonstrate behavioral control of depressive symptoms, verbalize feelings of improved mood/affect, and adopt new coping skills prior to discharge  Outcome: Not Progressing  Goal: Refrain from isolation  Description  Interventions:  - Develop a trusting relationship   - Encourage socialization   Outcome: Not Progressing  Goal: Refrain from self-neglect  Outcome: Not Progressing     Problem: Anxiety  Goal: Anxiety is at manageable level  Description  Interventions:  - Assess and monitor patient's anxiety level  - Monitor for signs and symptoms of anxiety both physical and emotional (heart palpitations, chest pain, shortness of breath, headaches, nausea, feeling jumpy, restlessness, irritable, apprehensive)  - Collaborate with interdisciplinary team and initiate plan and interventions as ordered  - [place] patient to unit/surroundings  - Explain treatment plan  - Encourage participation in care  - Encourage verbalization of concerns/fears  - Identify coping mechanisms  - Assist in developing anxiety-reducing skills  - Administer/offer alternative therapies  - Limit or eliminate stimulants  Outcome: Not Progressing     Problem: Nutrition/Hydration-ADULT  Goal: Nutrient/Hydration intake appropriate for improving, restoring or maintaining nutritional needs  Description  Monitor and assess patient's nutrition/hydration status for malnutrition  Collaborate with interdisciplinary team and initiate plan and interventions as ordered  Monitor patient's weight and dietary intake as ordered or per policy  Utilize nutrition screening tool and intervene as necessary  Determine patient's food preferences and provide high-protein, high-caloric foods as appropriate       INTERVENTIONS:  - Monitor oral intake, urinary output, labs, and treatment plans  - Assess nutrition and hydration status and recommend course of action  - Evaluate amount of meals eaten  - Assist patient with eating if necessary   - Allow adequate time for meals  - Recommend/ encourage appropriate diets, oral nutritional supplements, and vitamin/mineral supplements  - Order, calculate, and assess calorie counts as needed  - Recommend, monitor, and adjust tube feedings and TPN/PPN based on assessed needs  - Assess need for intravenous fluids  - Provide specific nutrition/hydration education as appropriate  - Include patient/family/caregiver in decisions related to nutrition  Outcome: Not Progressing   Mostly isolative to her room, out for meds and meals  Delusional and psychosomatic, left IMR group early saying "I had a panic attack because my left ear youtube is blocked" and "there are too many windows in that room" as reasons for leaving  Requested to see medical doctor  Doctor Lex Hastings made aware and following, he ordered PRN Debrox drops  Continues to eat poorly only eating 25% of breakfast and 10% of lunch  No other behaviors or issues noted  Med compliant  Continue to monitor

## 2020-03-30 NOTE — PROGRESS NOTES
Psychiatry Progress Note [address] 60 y o  female MRN: [id number]  Unit/Bed#: [facility] 178-82 Encounter: [id number]  Code Status: Level 1 - Full Code    PCP: Amy Cameron PA-C    Date of Admission:  7/23/2019 1730   Date of Service:  03/30/20    Diagnosis schizoaffective bipolar    Assessment   Overall Status: improving    Certification Statement: The patient will continue to require additional inpatient hospital stay to assess ability to maintain improvement by attending to ADLs and taking showers regular and see how she does on outing with family and the overnight pass at the personal care home once the virus restrictions are lifted   Acceptance by patient: accepting now  Polo Elizabeth in recovery: living at the [address] [place] [address] soon   Understanding of medications: yes     Involved in reintegration process: on hold due to [address] in relationship with psychiatrist: trusting now     Medication changes    None today    Non-pharmacological treatments   Continue with individual, group, milieu and occupational therapy using recovery principles and psycho-education about accepting illness and the need for treatment   Encourage to take showers twice a week and cooperate with treatment and adl skills as per her behav  Plan   Another therapeutic pass with sister now that she did well  with the assigned staff escort to the park but it is now on hold due to corana virus   Prepare for the [address] [place] [address] and encouraged to accept  rather than refuse it    Safety   Safety and communication plan established to target dynamic risk factors discussed above  Discharge Plan   · back to a [facility] at [address]   Patient is reporting no new problems and did take shower yesterday with help from the staff and appeared to be well groomed and well kept with well combed and braided hair  She has a tendency to stay back on her bed and does sit up when approached     She is still psychosomatic and questions her pills and wants to make sure no one has tampered with her inhalant or her oxygen concentrator which is an ongoing thing  However she is compliant with medications but does not eat meals all the time but has not get lost  weight  He is sleeping well  He does not admit to any overt delusional believes even though she does come across as somewhat suspicious and paranoid and preoccupied  She is still worried that she might die from corona virus or from shortness of breath or from talking on food or from heart attack and continues to demand certain tests even if they are not warranted like asking for echocardiogram!  Today she was very concerned about catching the corona virus  She is usually receptive to support and reassurance but she is constantly seeking the same  Tolerating medications with no side effects  Review of systems unremarkable    Mental Status Exam  Appearance: age appropriate and older than stated age better groomed with combed hair sitting on bed somewhat anxious appearing well groomed well kept  Behavior: evasive and guarded superficially friendly pleasant anxious and suspicious at times   Speech: delayed, increased latency of response and tangential   Mood: anxious, euphoric and irritable   Affect: increased in intensity, increased in range, mood-congruent and redirectable  Thought Process: circumstantial, concrete, goal directed, illogical and perserverative tendency to have stilted speech  Thought Content: delusions  grandiose and persecutory no overt delusions elicited but appears suspicious of staff tampering with her medications inhalant etc   Still psychosomatic about fear of death from corona virus heart attack etc needing frequent support and reassurance  No current suicidal homicidal thoughts intent or plans reported  No phobias obsessions or compulsions otherwise  Karrie Nissen   Perceptual Disturbances: None   Risk Potential: Inability to care for herself and refusal to take showers regularly  Sensorium: person, place, time/date, situation, day of week, month of year, year and time  Cognition: grossly intact aware of current events like the corona virus, no deficit in language, no deficit in recent or remote memory  Consciousness: alert and awake   Attention: Fair  Intellect: Average  Insight: Limited  Judgment: fair   Motor Activity: no abnormal movements      Vitals  Temp:  [97 7 °F (36 5 °C)-98 2 °F (36 8 °C)] 97 7 °F (36 5 °C)  HR:  [59-78] 67  Resp:  [16-18] 18  BP: (91-98)/(59-66) 91/59  SpO2:  [92 %-95 %] 95 %  No intake or output data in the 24 hours ending 03/30/20 0918    Lab Results: No New Labs Available For Today  Results from last 7 days   Lab Units 03/27/20  0534   WBC Thousand/uL 7 70   RBC Million/uL 4 67   HEMOGLOBIN g/dL 14 6   HEMATOCRIT % 43 3   MCV fL 93   PLATELETS Thousands/uL 193   NEUTROS ABS Thousands/µL 3 90         Current Facility-Administered Medications:  acetaminophen 325 mg Oral Q6H PRN Christian Bennett MD   acetaminophen 650 mg Oral Q6H PRN Christian Bennett MD   acetaminophen 650 mg Oral Q8H PRN Christian Bennett MD   albuterol 2 puff Inhalation Q4H PRN Christian Bennett MD   aluminum-magnesium hydroxide-simethicone 15 mL Oral Q4H PRN Christian Bennett MD   ammonium lactate 1 application Topical BID PRN Christian Bennett MD   benzonatate 100 mg Oral TID PRN Christian Bennett MD   benztropine 1 mg Intramuscular Q8H PRN Christian Bennett MD   carbamide peroxide 5 drop Left Ear BID PRN Kirk Paz MD   cloZAPine 25 mg Oral BID Christian Bennett MD   cloZAPine 50 mg Oral BID Christian Bennett MD   docusate sodium 100 mg Oral BID PRN Christian Bennett MD   EPINEPHrine PF 0 15 mg Intramuscular Once PRN Christian Bennett MD   fluticasone-vilanterol 1 puff Inhalation Daily Christian Bennett MD   ketotifen 1 drop Right Eye BID PRN Christian Bennett MD   levothyroxine 125 mcg Oral Early Morning Christian Bennett MD   magnesium hydroxide 30 mL Oral Daily PRN Christian Bennett MD   montelukast 10 mg Oral HS Christian Bennett, MD   OLANZapine 5 mg Intramuscular Q8H PRN Tree Madden MD   OLANZapine 5 mg Oral Q8H PRN Tree Madden MD   ondansetron 4 mg Oral Q6H PRN Tree Madden MD   pantoprazole 40 mg Oral Early Morning Tree Madden MD   polyethylene glycol 17 g Oral Daily PRN Tree Madden MD   polyvinyl alcohol 1 drop Both Eyes Q3H PRN Tree Madden MD   sertraline 175 mg Oral Daily Tree Madden MD   sucralfate 1,000 mg Oral BID Sachin Mahoney MD   theophylline 200 mg Oral Daily Tree Madden MD   tiotropium 18 mcg Inhalation Daily Tree Madden MD   traZODone 25 mg Oral HS PRN Tree Madedn MD       Counseling / Coordination of Care: Total floor / unit time spent today 15 minutes  Greater than 50% of total time was spent with the patient and / or family counseling and / or somewhat receptive to supportive listening and teaching positive coping skills to deal with symptom mangement  Patient's Rights, confidentiality and exceptions to confidentiality, use of automated medical record, Jeb Patrick staff access to medical record, and consent to treatment reviewed

## 2020-03-30 NOTE — TREATMENT TEAM
Patient left group complaining of a panic attack      03/30/20 1100   Activity/Group Checklist   Group   (IMR/Self Care Recovery Planning )   Attendance Did not attend   Attendance Duration (min) 46-60   Affect/Mood IRMA

## 2020-03-30 NOTE — PROGRESS NOTES
2130 Ernst Moreno did use the Frameri achieving mostly 1250ml volumes  She did have an HS snack, came up for HS medicine except the Singulair  Wearing now her QHS humidified nasal O2 @ 1L for bed

## 2020-03-31 PROBLEM — R22.43 LOCALIZED SWELLING OF BOTH LOWER LEGS: Status: RESOLVED | Noted: 2019-04-22 | Resolved: 2020-01-01

## 2020-03-31 PROBLEM — R63.1 POLYDIPSIA: Status: RESOLVED | Noted: 2019-03-29 | Resolved: 2020-01-01

## 2020-03-31 PROBLEM — H10.11 ALLERGIC CONJUNCTIVITIS OF RIGHT EYE: Status: RESOLVED | Noted: 2019-07-08 | Resolved: 2020-01-01

## 2020-03-31 NOTE — PROGRESS NOTES
2145 Kathrine Cabello continues somatic w/feeling "whoozy" & being unwilling to walk to kitchen for snack or to medicine window  Attributes to her L ear clog  Was given Debrox gtts 5 in L ear @ 2130 along w/her HS medicine (except the refused Singulair)  Did have an HS snack when it was brought to her in dining room  Performed her Incentive Spirometry achieving volumes of 1100-1250ml (mostly 1250's)  Wearing now her QHS humidified nasal O2 @ 1L for bed

## 2020-03-31 NOTE — PROGRESS NOTES
Psychiatry Progress Note [address] 60 y o  female MRN: [id number]  [facility] [place] [id number] Encounter: [id number]  Code Status: Level 1 - Full Code    PCP: Srinivasan Charles PA-C    Date of Admission:  7/23/2019 1730   Date of Service:  03/31/20  Patient Active Problem List   Diagnosis    COPD with asthma ([facility] )    Tobacco use disorder, continuous    Compression fracture of L4 lumbar vertebra    Ventral hernia    Acute on chronic respiratory failure with hypoxia ([facility] )    [facility], [facility]    Acquired hypothyroidism    Gastroesophageal reflux disease without esophagitis    Abnormal CT of the chest    Excessive cerumen in left ear canal    Lipoma of right upper extremity    Noncompliant with deep vein thrombosis (DVT) prophylaxis    At risk for aspiration     Diagnosis schizoaffective bipolar    Assessment   Overall Status: improving    Certification Statement: The patient will continue to require additional inpatient hospital stay to assess ability to maintain improvement by attending to ADLs and taking showers regular and see how she does on outing with family and the overnight pass at the personal care home once the virus restrictions are lifted   Acceptance by patient: accepting now  Ray Wick in recovery: living at the [address] [place] [address] Rd soon   Understanding of medications: yes     Involved in reintegration process: on hold due to [address] in relationship with psychiatrist: trusting now     Medication changes    None today    Non-pharmacological treatments   Continue with individual, group, milieu and occupational therapy using recovery principles and psycho-education about accepting illness and the need for treatment   Encourage to take showers twice a week and cooperate with treatment and adl skills as per her behav   Plan   Another therapeutic pass with sister now that she did well  with the assigned staff escort to the park but it is now on hold due to [address] virus   Prepare for the [address] [place] [address] Rd and encouraged to accept  rather than refuse it    Safety   Safety and communication plan established to target dynamic risk factors discussed above  Discharge Plan   · back to a [facility] at [address]   Patient did attend team and appeared to be friendly but still anxious about the corona virus and the delay in getting out to the personal care home  She has a tendency to stay back on her bed but does attend some of the groups  She is still psychosomatic and questions her pills by examining them and wants to make sure no one has tampered with her inhalant or her oxygen concentrator which is an ongoing thing  However she is compliant with medications but does not eat meals all the time but has not get lost  weight  He is sleeping well  He does not admit to any overt delusional believes even though she does come across as somewhat suspicious and paranoid and preoccupied  She is still worried that she might die from corona virus or from shortness of breath or from talking on food or from heart attack and continues to demand certain tests even if they are not warranted like asking for echocardiogram!  Today she was very concerned about catching the corona virus  She is usually receptive to support and reassurance but she is constantly seeking the same  Tolerating medications with no side effects  Review of systems unremarkable today other than for her psychosomatic complaints which are fixed    Mental Status Exam  Appearance: age appropriate and older than stated age better groomed with combed hair and did attend team meeting today, somewhat anxious appearing well groomed well kept    Behavior: evasive and guarded superficially friendly pleasant anxious and suspicious at times   Speech: delayed, increased latency of response and tangential   Mood: anxious, euphoric and irritable   Affect: increased in intensity, increased in range, mood-congruent and redirectable  Thought Process: circumstantial, concrete, goal directed, illogical and perserverative tendency to have stilted speech  Thought Content: delusions  grandiose and persecutory no overt delusions elicited but appears suspicious of staff tampering with her medications inhalant etc   Still psychosomatic about fear of death from corona virus heart attack etc needing frequent support and reassurance  No current suicidal homicidal thoughts intent or plans reported  No phobias obsessions or compulsions otherwise  Soledad Kappa   Perceptual Disturbances: None   Risk Potential: Inability to care for herself and refusal to take showers regularly  Sensorium: person, place, time/date, situation, day of week, month of year, year and time  Cognition: grossly intact aware of current events like the corona virus, no deficit in language, no deficit in recent or remote memory  Consciousness: alert and awake   Attention: Fair  Intellect: Average  Insight: Limited  Judgment: fair   Motor Activity: no abnormal movements      Vitals  Temp:  [96 8 °F (36 °C)-98 4 °F (36 9 °C)] 96 8 °F (36 °C)  HR:  [74-80] 76  Resp:  [18] 18  BP: ()/(51-68) 97/63  SpO2:  [93 %] 93 %  No intake or output data in the 24 hours ending 03/31/20 0928    Lab Results: No New Labs Available For Today  Results from last 7 days   Lab Units 03/27/20  0534   WBC Thousand/uL 7 70   RBC Million/uL 4 67   HEMOGLOBIN g/dL 14 6   HEMATOCRIT % 43 3   MCV fL 93   PLATELETS Thousands/uL 193   NEUTROS ABS Thousands/µL 3 90         Current Facility-Administered Medications:  acetaminophen 325 mg Oral Q6H PRN Krystyna Mcclain MD   acetaminophen 650 mg Oral Q6H PRN Krystyna Mcclain MD   acetaminophen 650 mg Oral Q8H PRN Krystyna Mcclain MD   albuterol 2 puff Inhalation Q4H PRN Krystyna Mcclain MD   aluminum-magnesium hydroxide-simethicone 15 mL Oral Q4H PRN Krystyna Mcclain MD   ammonium lactate 1 application Topical BID PRN Krystyna Mcclain MD   benzonatate 100 mg Oral TID PRN Jennifer Taveras MD   benztropine 1 mg Intramuscular Q8H PRN Jennifer Taveras MD   carbamide peroxide 5 drop Left Ear BID PRN Praveena Dong MD   cloZAPine 25 mg Oral BID Jennifer Taveras MD   cloZAPine 50 mg Oral BID Jennifer Taveras MD   docusate sodium 100 mg Oral BID PRN Jennifer Taveras MD   EPINEPHrine PF 0 15 mg Intramuscular Once PRN Jennifer Taveras MD   fluticasone-vilanterol 1 puff Inhalation Daily Jennifer Taveras MD   ketotifen 1 drop Right Eye BID PRN Jennifer Taveras MD   levothyroxine 125 mcg Oral Early Morning Jennifer Taveras MD   magnesium hydroxide 30 mL Oral Daily PRN Jennifer Taveras MD   montelukast 10 mg Oral HS Jennifer Taveras MD   OLANZapine 5 mg Intramuscular Q8H PRN Jennifer Taveras MD   OLANZapine 5 mg Oral Q8H PRN Jennifer Taveras MD   ondansetron 4 mg Oral Q6H PRN Jennifer Taveras MD   pantoprazole 40 mg Oral Early Morning Jennifer Taveras MD   polyethylene glycol 17 g Oral Daily PRN Jennifer Taveras MD   polyvinyl alcohol 1 drop Both Eyes Q3H PRN Jennifer Taveras MD   sertraline 175 mg Oral Daily Jennifer Taveras MD   sucralfate 1,000 mg Oral BID Fletcher Nevarez MD   theophylline 200 mg Oral Daily Jennifer Taveras MD   tiotropium 18 mcg Inhalation Daily Jennifer Taveras MD   traZODone 25 mg Oral HS PRN Jennifer Taveras MD       Counseling / Coordination of Care: Total floor / unit time spent today 15 minutes  Greater than 50% of total time was spent with the patient and / or family counseling and / or somewhat receptive to supportive listening and teaching positive coping skills to deal with symptom mangement  Patient's Rights, confidentiality and exceptions to confidentiality, use of automated medical record, [facility] staff access to medical record, and consent to treatment reviewed

## 2020-03-31 NOTE — PROGRESS NOTES
03/31/20 0800   Team Meeting   Meeting Type Daily Rounds   Team Members Present   Team Members Present Physician;Nurse;; Other (Discipline and Name)   Physician Team Member Dr Francis Franco Team Member Lu Young, RN   Care Management Team Member Leann Diaz MSW   Other (Discipline and Name) Anthony Colindres LCSW; SHANIKA Barrios     Patient left [facility] early yesterday due to a "panic attack "  She is reporting that her 3-11 medications are making her "woozy " She has been presenting with somatic preoccupation  Slept

## 2020-03-31 NOTE — PLAN OF CARE
Problem: Alteration in Thoughts and Perception  Goal: Verbalize thoughts and feelings  Description  Interventions:  - Promote a nonjudgmental and trusting relationship with the patient through active listening and therapeutic communication  - Assess patient's level of functioning, behavior and potential for risk  - Engage patient in 1 on 1 interactions for a minimum of 15 minutes each session  - Encourage patient to express fears, feelings, frustrations, and discuss symptoms    - [place] patient to reality, help patient recognize reality-based thinking   - Administer medications as ordered and assess for potential side effects  - Provide the patient education related to the signs and symptoms of the illness and desired effects of prescribed medications  Outcome: Progressing  Goal: Attend and participate in unit activities, including therapeutic, recreational, and educational groups  Description  Interventions:  - Provide therapeutic and educational activities daily, encourage attendance and participation, and document same in the medical record     CERTIFIED PEER SPECIALIST INTERVENTIONS:    Complete peer assessment with patient to assess their needs and identify their goals to complete while in the recovery program as well as once discharged into the community  Patient will complete WRAP Plan, Crisis Plan and 5 Life Domains  Patient will attend 50% of groups offered on the unit  Patient will complete a goal card weekly      Outcome: Progressing     Problem: Alteration in Orientation  Goal: Interact with staff daily  Description  Interventions:  - Assess and re-assess patient's level of orientation  - Engage patient in 1 on 1 interactions, daily, for a minimum of 15 minutes   - Establish rapport/trust with patient   Outcome: Progressing     Problem: SAFETY ADULT  Goal: Patient will remain free of falls  Description  INTERVENTIONS:  - Assess patient frequently for physical needs  -  Identify cognitive and physical deficits and behaviors that affect risk of falls  -  [place] fall precautions as indicated by assessment   - Educate patient/family on patient safety including physical limitations  - Instruct patient to call for assistance with activity based on assessment  - Modify environment to reduce risk of injury  - Consider OT/PT consult to assist with strengthening/mobility  Outcome: Progressing     Problem: Alteration in Thoughts and Perception  Goal: Complete daily ADLs, including personal hygiene independently, as able  Description  Interventions:  - Observe, teach, and assist patient with ADLS  - Monitor and promote a balance of rest/activity, with adequate nutrition and elimination   Outcome: Not Progressing     Niki Henderson was sleeping at the beginning of the shift  Had to wake her for medication which she took without a problem  Ate only 25% of supper and drank 100% of Ensure  Did not shower tonight  Has not been somatic  Napping at times  Did not attend evening group  Took HS medication with prompting  Ate snack  Ear drops to left ear per request at 2144  Oxygen saturation 93% on room air prior to oxygen 1 liter (humidified) nasal cannula applied  Continue to monitor  Precautions maintained

## 2020-03-31 NOTE — TREATMENT TEAM
Patient declined      03/31/20 0900   Activity/Group Checklist   Group   (Mindfulness Chair Yoga/Coffee Hour )   Attendance Did not attend   Attendance Duration (min) 16-30   Affect/Mood IRMA

## 2020-03-31 NOTE — TREATMENT TEAM
Patient declined group      03/31/20 1400   Activity/Group Checklist   Group   (Recovery Workshop/Open Studio )   Attendance Did not attend   Attendance Duration (min) 46-60   Affect/Mood IRMA

## 2020-03-31 NOTE — PROGRESS NOTES
Maggie maintained on ongoing fall and SAFE precaution  Fabiano Jerson in bed with eyes closed, breath even and unlabored   On O2 with humidifier @1L/m via nasal cannula  Continues rounding implemented   No somatic complaint overnight  No PRN needed for sleep aid   No indication of pain or discomfort   No respiratory distress   Will continue to monitor

## 2020-03-31 NOTE — PROGRESS NOTES
03/31/20 1459   Team Meeting   Meeting Type Tx Team Meeting   Initial Conference Date 03/31/20   Next Conference Date 04/07/20   Team Members Present   Team Members Present Physician;Nurse;; Other (Discipline and Name)   Physician Team Member Dr Celina [place]otte Team Member Monika Rod RN   Care Management Team Member ASHLYN Arias   Other (Discipline and Name) [facility]   Patient/Family Present   Patient Present Yes   Patient's Family Present No     Patient attended treatment team meeting this morning prepared without self-assessment  Patient's group attendance for last week was 41% and IMR was completed  Team and patient completed risk assessment and the patient did not verbalize any desire to elope from the program  Patient verbalized understanding of consequences of eloping from treatment while on a commitment  Patient verbalized no further questions or concerns at the conclusion of the meeting  Next team meeting scheduled for 4/7/2020  Patient reports pleasant and kept proper eye contact  Patient reports she is "filled with anxiety and I am off balance because of my eyes " Patient reports that she is sad about having no passes as she wanted to go stay with her sister  Patient continues to be somatic and finding issues wrong with [employer]  Patient reports she is worried about her son and daughter in law with COVID but has not reached out to him

## 2020-03-31 NOTE — PLAN OF CARE
Problem: Alteration in Thoughts and Perception  Goal: Verbalize thoughts and feelings  Description  Interventions:  - Promote a nonjudgmental and trusting relationship with the patient through active listening and therapeutic communication  - Assess patient's level of functioning, behavior and potential for risk  - Engage patient in 1 on 1 interactions for a minimum of 15 minutes each session  - Encourage patient to express fears, feelings, frustrations, and discuss symptoms    - [place] patient to reality, help patient recognize reality-based thinking   - Administer medications as ordered and assess for potential side effects  - Provide the patient education related to the signs and symptoms of the illness and desired effects of prescribed medications  Outcome: Progressing  Goal: Agree to be compliant with medication regime, as prescribed and report medication side effects  Description  Interventions:  - Offer appropriate PRN medication and supervise ingestion; conduct aims, as needed   Outcome: Progressing     Problem: Ineffective Coping  Goal: Patient/Family verbalizes awareness of resources  Outcome: Progressing  Goal: Understands least restrictive measures  Description  Interventions:  - Utilize least restrictive behavior  Outcome: Progressing     Problem: Risk for Self Injury/Neglect  Goal: Treatment Goal: Remain safe during length of stay, learn and adopt new coping skills, and be free of self-injurious ideation, impulses and acts at the time of discharge  Outcome: Progressing  Goal: Verbalize thoughts and feelings  Description  Interventions:  - Assess and re-assess patient's lethality and potential for self-injury  - Engage patient in 1:1 interactions, daily, for a minimum of 15 minutes  - Encourage patient to express feelings, fears, frustrations, hopes  - Establish rapport/trust with patient   Outcome: Progressing  Goal: Refrain from harming self  Description  Interventions:  - Monitor patient closely, per order  - Develop a trusting relationship  - Supervise medication ingestion, monitor effects and side effects   Outcome: Progressing     Problem: Depression  Goal: Verbalize thoughts and feelings  Description  Interventions:  - Assess and re-assess patient's level of risk   - Engage patient in 1:1 interactions, daily, for a minimum of 15 minutes   - Encourage patient to express feelings, fears, frustrations, hopes   Outcome: Progressing     Problem: Anxiety  Goal: Anxiety is at manageable level  Description  Interventions:  - Assess and monitor patient's anxiety level  - Monitor for signs and symptoms of anxiety both physical and emotional (heart palpitations, chest pain, shortness of breath, headaches, nausea, feeling jumpy, restlessness, irritable, apprehensive)  - Collaborate with interdisciplinary team and initiate plan and interventions as ordered    - [place] patient to unit/surroundings  - Explain treatment plan  - Encourage participation in care  - Encourage verbalization of concerns/fears  - Identify coping mechanisms  - Assist in developing anxiety-reducing skills  - Administer/offer alternative therapies  - Limit or eliminate stimulants  Outcome: Progressing     Problem: Alteration in Orientation  Goal: Interact with staff daily  Description  Interventions:  - Assess and re-assess patient's level of orientation  - Engage patient in 1 on 1 interactions, daily, for a minimum of 15 minutes   - Establish rapport/trust with patient   Outcome: Progressing     Problem: PAIN - ADULT  Goal: Verbalizes/displays adequate comfort level or baseline comfort level  Description  Interventions:  - Encourage patient to monitor pain and request assistance  - Assess pain using appropriate pain scale  - Administer analgesics based on type and severity of pain and evaluate response  - Implement non-pharmacological measures as appropriate and evaluate response  - Consider cultural and social influences on pain and pain management  - Notify physician/advanced practitioner if interventions unsuccessful or patient reports new pain  Outcome: Progressing     Problem: SAFETY ADULT  Goal: Patient will remain free of falls  Description  INTERVENTIONS:  - Assess patient frequently for physical needs  -  Identify cognitive and physical deficits and behaviors that affect risk of falls  -  [place] fall precautions as indicated by assessment   - Educate patient/family on patient safety including physical limitations  - Instruct patient to call for assistance with activity based on assessment  - Modify environment to reduce risk of injury  - Consider OT/PT consult to assist with strengthening/mobility  Outcome: Progressing   Mostly isolative to her bed in her room, coming out only for meds, meals, and her treatment team meeting  Paranoid and suspicious about the early morning medications that she received saying "I think she gave me the wrong thing, the pill looked different"  Reassured patient that she received the correct medication, explaining that medications come in different shapes and sizes depending on who manufactured them  Demonstrated by showing patient drug guide with multiple pictures of the same medication which seemed to put the patient at ease  Remains somatically preoccupied on her left ear being "blocked with ear wax" and how it is the current source of all her troubles  PRN Debrox drops applied to left ear following routine medication administration  Continues to eat poorly, only ate 25% of breakfast and 10% of lunch  No other behaviors or issues noted  Continue to monitor

## 2020-03-31 NOTE — PLAN OF CARE
Problem: Alteration in Thoughts and Perception  Goal: Verbalize thoughts and feelings  Description  Interventions:  - Promote a nonjudgmental and trusting relationship with the patient through active listening and therapeutic communication  - Assess patient's level of functioning, behavior and potential for risk  - Engage patient in 1 on 1 interactions for a minimum of 15 minutes each session  - Encourage patient to express fears, feelings, frustrations, and discuss symptoms    - [place] patient to reality, help patient recognize reality-based thinking   - Administer medications as ordered and assess for potential side effects  - Provide the patient education related to the signs and symptoms of the illness and desired effects of prescribed medications  Outcome: Progressing  Goal: Agree to be compliant with medication regime, as prescribed and report medication side effects  Description  Interventions:  - Offer appropriate PRN medication and supervise ingestion; conduct aims, as needed   Outcome: Progressing  Goal: Attend and participate in unit activities, including therapeutic, recreational, and educational groups  Description  Interventions:  - Provide therapeutic and educational activities daily, encourage attendance and participation, and document same in the medical record     CERTIFIED PEER SPECIALIST INTERVENTIONS:    Complete peer assessment with patient to assess their needs and identify their goals to complete while in the recovery program as well as once discharged into the community  Patient will complete WRAP Plan, Crisis Plan and 5 Life Domains  Patient will attend 50% of groups offered on the unit  Patient will complete a goal card weekly      Outcome: Not Progressing     Problem: Ineffective Coping  Goal: Participates in unit activities  Description  Interventions:  - Provide therapeutic environment   - Provide required programming   - Redirect inappropriate behaviors   Outcome: Not Progressing     Problem: Risk for Self Injury/Neglect  Goal: Verbalize thoughts and feelings  Description  Interventions:  - Assess and re-assess patient's lethality and potential for self-injury  - Engage patient in 1:1 interactions, daily, for a minimum of 15 minutes  - Encourage patient to express feelings, fears, frustrations, hopes  - Establish rapport/trust with patient   Outcome: Progressing  Goal: Refrain from harming self  Description  Interventions:  - Monitor patient closely, per order  - Develop a trusting relationship  - Supervise medication ingestion, monitor effects and side effects   Outcome: Progressing  Goal: Complete daily ADLs, including personal hygiene independently, as able  Description  Interventions:  - Observe, teach, and assist patient with ADLS  - Monitor and promote a balance of rest/activity, with adequate nutrition and elimination  Outcome: Not Progressing     Problem: Depression  Goal: Refrain from isolation  Description  Interventions:  - Develop a trusting relationship   - Encourage socialization   Outcome: Not Progressing  Goal: Refrain from self-neglect  Outcome: Not Progressing     Problem: Alteration in Orientation  Goal: Interact with staff daily  Description  Interventions:  - Assess and re-assess patient's level of orientation  - Engage patient in 1 on 1 interactions, daily, for a minimum of 15 minutes   - Establish rapport/trust with patient   Outcome: Progressing     Problem: SAFETY ADULT  Goal: Patient will remain free of falls  Description  INTERVENTIONS:  - Assess patient frequently for physical needs  -  Identify cognitive and physical deficits and behaviors that affect risk of falls    -  [place] fall precautions as indicated by assessment   - Educate patient/family on patient safety including physical limitations  - Instruct patient to call for assistance with activity based on assessment  - Modify environment to reduce risk of injury  - Consider OT/PT consult to assist with strengthening/mobility  Outcome: Man [place]oy is awake and alert at the onset of the shift  She is pleasant, cooperative but isolated to her room  Continues to be meds and meal compliant  Used the incentive spirometer this evening reaching [phone]  Denies any SOB  SPO2 at 92%RA prior to 1L/m 02 with humidification  Denies any depression or anxiety  No delusion or a/t/v hallucination noted  No behavior noted  Will continue monitor  Maintained on ongoing SAFE precaution

## 2020-03-31 NOTE — PLAN OF CARE
Problem: Alteration in Thoughts and Perception  Goal: Attend and participate in unit activities, including therapeutic, recreational, and educational groups  Description  Interventions:  - Provide therapeutic and educational activities daily, encourage attendance and participation, and document same in the medical record     CERTIFIED PEER SPECIALIST INTERVENTIONS:    Complete peer assessment with patient to assess their needs and identify their goals to complete while in the recovery program as well as once discharged into the community  Patient will complete WRAP Plan, Crisis Plan and 5 Life Domains  Patient will attend 50% of groups offered on the unit  Patient will complete a goal card weekly  Outcome: Not Progressing  Writer met with Patient briefly to discuss her lack of motivation and decline in groups  Writer ask Patient if there was anything staff could do at this time to assist her in getting back on track  Patient blamed problems on inner ear issues and the cause of her "panic attacks," reassuring this writer it has nothing to do with anxiety, depression or stress related symptoms  Writer suggested that we meet for face to face, since she claims also to be having difficultly in the group room  Writer offered face to face meetings along with assisting her complete her discharge packet containing her WRAP, BH Relapse Prevention and Life Domain goals  Patient declined all of the above, politely yet declined  Writer will assist when Patient feels she is ready

## 2020-03-31 NOTE — PLAN OF CARE
Problem: Alteration in Thoughts and Perception  Goal: Verbalize thoughts and feelings  Description  Interventions:  - Promote a nonjudgmental and trusting relationship with the patient through active listening and therapeutic communication  - Assess patient's level of functioning, behavior and potential for risk  - Engage patient in 1 on 1 interactions for a minimum of 15 minutes each session  - Encourage patient to express fears, feelings, frustrations, and discuss symptoms    - [place] patient to reality, help patient recognize reality-based thinking   - Administer medications as ordered and assess for potential side effects  - Provide the patient education related to the signs and symptoms of the illness and desired effects of prescribed medications  Outcome: Progressing  Goal: Agree to be compliant with medication regime, as prescribed and report medication side effects  Description  Interventions:  - Offer appropriate PRN medication and supervise ingestion; conduct aims, as needed   Outcome: Progressing     Problem: Alteration in Thoughts and Perception  Goal: Attend and participate in unit activities, including therapeutic, recreational, and educational groups  Description  Interventions:  - Provide therapeutic and educational activities daily, encourage attendance and participation, and document same in the medical record     CERTIFIED PEER SPECIALIST INTERVENTIONS:    Complete peer assessment with patient to assess their needs and identify their goals to complete while in the recovery program as well as once discharged into the community  Patient will complete WRAP Plan, Crisis Plan and 5 Life Domains  Patient will attend 50% of groups offered on the unit  Patient will complete a goal card weekly      Outcome: Not Progressing     Problem: Depression  Goal: Refrain from isolation  Description  Interventions:  - Develop a trusting relationship   - Encourage socialization   Outcome: Not Progressing 2015 Garcia Heather has been isolative to room & bed  She describes today as a difficult & exhausting day due to her clogged L ear making her feel physically off balance & leading to experiencing "panic attacks"  She did accept her supper medicine, but, only when it was brought to her as afraid would fall if walked as far as window  Did eat 25% of meal (ice cream), drank her Ensure  Did not respond to invitations to optional Fresh air group or PM Group

## 2020-03-31 NOTE — TREATMENT TEAM
Patient declined      03/31/20 1100   Activity/Group Checklist   Group   (IMR/Stress Management )   Attendance Did not attend   Attendance Duration (min) 46-60   Affect/Mood IRMA

## 2020-04-01 NOTE — PROGRESS NOTES
Psychiatry Progress Note [address] 60 y o  female MRN: [id number]  [facility] [place] [address] Encounter: [id number]  Code Status: Level 1 - Full Code    PCP: Naila Khalil PA-C    Date of Admission:  7/23/2019 1730   Date of Service:  04/01/20  Patient Active Problem List   Diagnosis    COPD [facility] asthma ([facility]    Tobacco use disorder, continuous    Compression fracture of L4 lumbar vertebra    Ventral hernia    Acute on chronic respiratory failure with hypoxia ([facility] )    [facility], [facility]    Acquired hypothyroidism    Gastroesophageal reflux disease without esophagitis    Abnormal CT of the chest    Excessive cerumen in left ear canal    Lipoma of right upper extremity    Noncompliant with deep vein thrombosis (DVT) prophylaxis    At risk for aspiration     Diagnosis schizoaffective bipolar    Assessment   Overall Status: improving    Certification Statement: The patient will continue to require additional inpatient hospital stay to assess ability to maintain improvement by attending to ADLs and taking showers regular and see how she does on outing with family and the overnight pass at the personal care home once the virus restrictions are lifted   Acceptance by patient: accepting now  Tiago Jackson in recovery: living at the [address] [place] [address] Rd soon   Understanding of medications: yes     Involved in reintegration process: on hold due to [address] in relationship with psychiatrist: trusting now     Medication changes    None today    Non-pharmacological treatments   Continue with individual, group, milieu and occupational therapy using recovery principles and psycho-education about accepting illness and the need for treatment   Encourage to take showers twice a week and cooperate with treatment and adl skills as per her behav   Plan   Another therapeutic pass with sister now that she did well  with the assigned staff escort to the park but it is now on hold due to [address] virus   Prepare for the [address] [place] [address] Rd and encouraged to accept  rather than refuse it    Safety   Safety and communication plan established to target dynamic risk factors discussed above  Discharge Plan   · back to a [facility] at [address]   Patient was found sitting back on her bed but does attend some of the groups  She is still psychosomatic and questions her pills by examining them as she did again this morning does not trust the staff with the medications unless she checks herself  She also questions if anyone has tampered with her inhalant or her oxygen concentrator which is an ongoing thing  However she is compliant with medications but does not eat meals all the time but has not  lost  weight  He is sleeping well  He does not admit to any overt delusional believes even though she does come across as somewhat suspicious and paranoid and preoccupied  She is still worried that she might die from corona virus or from shortness of breath or from choking on food or from heart attack and continues to demand certain tests even if they are not warranted but is usually redirectable with support, reassurance and gentle redirection   She continues to be concerned about catching the corona virus  Tolerating medications with no side effects  Review of systems unremarkable today other than for her psychosomatic complaints which are fixed  She was reminded to keep attending group    Mental Status Exam  Appearance: age appropriate and older than stated age better groomed with combed hair sitting on bed somewhat anxious appearing well groomed well kept    Behavior: evasive and guarded superficially friendly pleasant anxious and suspicious at times   Speech: delayed, increased latency of response and tangential   Mood: anxious, euphoric and irritable   Affect: increased in intensity, increased in range, mood-congruent and redirectable  Thought Process: circumstantial, concrete, goal directed, illogical and perserverative tendency to have stilted speech  Thought Content: delusions  grandiose and persecutory no overt delusions elicited but appears suspicious of staff tampering with her medications inhalant etc   Still psychosomatic about fear of death from corona virus heart attack etc needing frequent support and reassurance  No current suicidal homicidal thoughts intent or plans reported  No phobias obsessions or compulsions otherwise  Rey Mcdowell   Perceptual Disturbances: None   Risk Potential: Inability to care for herself and refusal to take showers regularly  Sensorium: person, place, time/date, situation, day of week, month of year, year and time  Cognition: grossly intact aware of current events like the corona virus, no deficit in language, no deficit in recent or remote memory  Consciousness: alert and awake   Attention: Fair  Intellect: Average  Insight: Limited  Judgment: fair   Motor Activity: no abnormal movements      Vitals  Temp:  [97 °F (36 1 °C)-98 7 °F (37 1 °C)] 97 °F (36 1 °C)  HR:  [68-78] 68  Resp:  [15-16] 15  BP: ()/(56-62) 96/56  SpO2:  [93 %] 93 %    Intake/Output Summary (Last 24 hours) at 4/1/2020 0809  Last data filed at 3/31/2020 1900  Gross per 24 hour   Intake    Output 1 ml   Net -1 ml       Lab Results: No New Labs Available For Today  Results from last 7 days   Lab Units 03/27/20  0534   WBC Thousand/uL 7 70   RBC Million/uL 4 67   HEMOGLOBIN g/dL 14 6   HEMATOCRIT % 43 3   MCV fL 93   PLATELETS Thousands/uL 193   NEUTROS ABS Thousands/µL 3 90         Current Facility-Administered Medications:  acetaminophen 325 mg Oral Q6H PRN Allie Guzman MD   acetaminophen 650 mg Oral Q6H PRN Allie Guzman MD   acetaminophen 650 mg Oral Q8H PRN Allie Guzman MD   albuterol 2 puff Inhalation Q4H PRN Allie Guzman MD   aluminum-magnesium hydroxide-simethicone 15 mL Oral Q4H PRN Allie Guzman MD   ammonium lactate 1 application Topical BID PRN Linward Cutting Juvenal Lane MD   benzonatate 100 mg Oral TID PRN Manuel Seeds, MD   benztropine 1 mg Intramuscular Q8H PRN [place] Seeds, MD   carbamide peroxide 5 drop Left Ear BID PRN Ute Ped, MD   cloZAPine 25 mg Oral BID Manuel Seeds, MD   cloZAPine 50 mg Oral BID [place] Seeds, MD   docusate sodium 100 mg Oral BID PRN [place] Seeds, MD   EPINEPHrine PF 0 15 mg Intramuscular Once PRN [place] Seeds, MD   fluticasone-vilanterol 1 puff Inhalation Daily [place] Seeds, MD   ketotifen 1 drop Right Eye BID PRN Manuel Seeds, MD   levothyroxine 125 mcg Oral Early Morning [place] Seeds, MD   magnesium hydroxide 30 mL Oral Daily PRN Manuel Seeds, MD   montelukast 10 mg Oral HS [place] Seeds, MD   OLANZapine 5 mg Intramuscular Q8H PRN Manuel Seeds, MD   OLANZapine 5 mg Oral Q8H PRN Manuel Seeds, MD   ondansetron 4 mg Oral Q6H PRN Manuel Seeds, MD   pantoprazole 40 mg Oral Early Morning Manuel Seeds, MD   polyethylene glycol 17 g Oral Daily PRN [place] Seeds, MD   polyvinyl alcohol 1 drop Both Eyes Q3H PRN Manuel Seeds, MD   sertraline 175 mg Oral Daily Manuel Seeds, MD   sucralfate 1,000 mg Oral BID Fayetta Primmer, MD   theophylline 200 mg Oral Daily Manuel Seeds, MD   tiotropium 18 mcg Inhalation Daily [place] Seeds, MD   traZODone 25 mg Oral HS PRN Manuel Seeds, MD       Counseling / Coordination of Care: Total floor / unit time spent today 15 minutes  Greater than 50% of total time was spent with the patient and / or family counseling and / or somewhat receptive to supportive listening and teaching positive coping skills to deal with symptom mangement  Patient's Rights, confidentiality and exceptions to confidentiality, use of automated medical record, [facility] Patrick staff access to medical record, and consent to treatment reviewed

## 2020-04-01 NOTE — TREATMENT TEAM
Patient declined      04/01/20 1100   Activity/Group Checklist   Group   (IMR/Music Appreciation/Applying Coping Skills )   Attendance Did not attend   Attendance Duration (min) 46-60   Affect/Mood IRMA

## 2020-04-01 NOTE — PROGRESS NOTES
04/01/20 0800   Team Meeting   Meeting Type Daily Rounds   Team Members Present   Team Members Present Physician;Nurse;; Other (Discipline and Name)   Physician Team Member Dr Helena Navarro Team Member Mindy Yin RN   Care Management Team Member ASHLYN Barnes   Other (Discipline and Name) JER CowanW; Jorje Nephew, CPS     Did not attend groups  Somatically preoccupied  Paranoid  Slept

## 2020-04-01 NOTE — PLAN OF CARE
Problem: Alteration in Thoughts and Perception  Goal: Attend and participate in unit activities, including therapeutic, recreational, and educational groups  Description  Interventions:  - Provide therapeutic and educational activities daily, encourage attendance and participation, and document same in the medical record     CERTIFIED PEER SPECIALIST INTERVENTIONS:    Complete peer assessment with patient to assess their needs and identify their goals to complete while in the recovery program as well as once discharged into the community  Patient will complete WRAP Plan, Crisis Plan and 5 Life Domains  Patient will attend 50% of groups offered on the unit  Patient will complete a goal card weekly  Outcome: Not Progressing  Goal: Complete daily ADLs, including personal hygiene independently, as able  Description  Interventions:  - Observe, teach, and assist patient with ADLS  - Monitor and promote a balance of rest/activity, with adequate nutrition and elimination   Outcome: Not Progressing    Individual has been keeping to self, in room the majority of the shift  She was visible at meals and med pass only  She did not participate in programming, no groups were attended  She remains somatic at times, c/o ear issues  She c/o increase in anxiety, but on approach appears in control  Compliant with medications, struggles with appetite and food consumption  Availability of staff made known, able to express needs  Will continue to monitor

## 2020-04-01 NOTE — TREATMENT TEAM
Patient declined      04/01/20 0900   Activity/Group Checklist   Group Community meeting  (Community Walk/Coffee Hour )   Attendance Did not attend   Attendance Duration (min) 16-30   Affect/Mood IRMA

## 2020-04-01 NOTE — PROGRESS NOTES
Maggie maintained on ongoing fall and SAFE precaution  Harish Everton in bed with eyes closed, breath even and unlabored   On O2 with humidifier @1L/m via nasal cannula  Continues rounding implemented   No somatic complaint overnight  No PRN needed for sleep aid   No indication of pain or discomfort   No respiratory distress   Will continue to monitor

## 2020-04-02 NOTE — PROGRESS NOTES
Progress Note    Felicitas Alu 60 y o  female MRN: [id number]  Unit/Bed#: [facility] 065-97 Encounter: [id number]  Admitting Physician: Duyen Card MD  PCP: Reggie Berry PA-C  Date of Admission:  7/23/2019  5:30 PM    Assessment and Plan    Ear ache  Assessment & Plan  Left ear ache present for the past couple of days  Has had this symptoms previously, using debrox drops that provided relief  Patient afebrile and hemodynamically stable  No tenderness on external palpation, no hearing loss or decrease hearing  Ear exam with otoscope unremarkable, tympanic membrane normal appearance with no signs of fluid erythema or signs of infection noted  -continue debrox   -Will continue to monitor clinically     Gastroesophageal reflux disease without esophagitis  Assessment & Plan  Controlled  Continue with Protonix, 40 mg daily  Acquired hypothyroidism  Assessment & Plan  Controlled  Last TSH 2/29/2020: 2 2     -Continue with Levothyroxine 125 mcg daily    COPD with asthma ([address] )  Assessment & Plan  Currently asymtomatic  Denies chest pain or shortness of breath     -Cont w/ daily inhalers: Breo Ellipta QD  -Theophylline 200 mg QD  -Albuterol, PRN    * Schizoaffective disorder, bipolar type (HCC)  Assessment & Plan  Mgmt per Psychiatry      VTE Pharmacologic Prophylaxis:   Pharmacologic: not required; patient is ambulating  Mechanical VTE Prophylaxis in Place: No    Patient Centered Rounds: I have performed bedside rounds with nursing staff today  Discussions with Specialists or Other Care Team Provider: none    Education and Discussions with Family / Patient: patient    Time Spent for Care: 20 minutes  More than 50% of total time spent on counseling and coordination of care as described above  Current Length of Stay: 254 day(s)    Current Patient Status: Psych - Long Term     Discharge Plan: per primary team    Code Status: Level 1 - Full Code      Subjective:     Patient was evaluated today at bedside  She was alert, awake and in No distress  She is feeling well, states that she has had some discomfort in her left ear with sensation of 'fullness' and feeling as if there is 'liquid' in her ear, but otherwise No additional complaints  Denies chest pain, shortness of breath, dysuria or abdominal pain  Objective:     Vitals:   Temp (24hrs), Av 5 °F (36 4 °C), Min:97 5 °F (36 4 °C), Max:97 5 °F (36 4 °C)    Temp:  [97 5 °F (36 4 °C)] 97 5 °F (36 4 °C)  HR:  [84-85] 85  Resp:  [18] 18  BP: (111-112)/(61-69) 112/69  Body mass index is 24 96 kg/m²  Input and Output Summary (last 24 hours):     No intake or output data in the 24 hours ending 20 1609    Physical Exam:     Physical Exam   Constitutional: She is oriented to person, place, and time  She appears well-developed and well-nourished  No distress  HENT:   Head: Atraumatic  Left Ear: Hearing, tympanic membrane, external ear and ear canal normal  No drainage or tenderness  Tympanic membrane is not erythematous and not bulging  Eyes: EOM are normal    Neck: Neck supple  Cardiovascular: Normal rate and normal heart sounds  No murmur heard  Pulmonary/Chest: Effort normal and breath sounds normal  No respiratory distress  She has no wheezes  Abdominal: Soft  Bowel sounds are normal  She exhibits no distension  There is no tenderness  Musculoskeletal: Normal range of motion  She exhibits no edema, tenderness or deformity  Neurological: She is alert and oriented to person, place, and time  Skin: Skin is warm  No rash noted  She is not diaphoretic  No erythema  No pallor  Psychiatric: She has a normal mood and affect       Additional Data:     Labs:    Results from last 7 days   Lab Units 20  0534   WBC Thousand/uL 7 70   HEMOGLOBIN g/dL 14 6   HEMATOCRIT % 43 3   PLATELETS Thousands/uL 193   NEUTROS PCT % 51   LYMPHS PCT % 37   MONOS PCT % 10   EOS PCT % 2           Invalid input(s): LABALBU                  * I Have Reviewed All Lab Data Listed Above  * Additional Pertinent Lab Tests Reviewed: All Labs Within Last 24 Hours Reviewed      Recent Cultures (last 7 days):           Last 24 Hours Medication List:     Current Facility-Administered Medications:  acetaminophen 325 mg Oral Q6H PRN Christian Bennett MD   acetaminophen 650 mg Oral Q6H PRN Christian Bennett MD   acetaminophen 650 mg Oral Q8H PRN Christian Bennett MD   albuterol 2 puff Inhalation Q4H PRN Christian Bennett MD   aluminum-magnesium hydroxide-simethicone 15 mL Oral Q4H PRN Christian Bennett MD   ammonium lactate 1 application Topical BID PRN Christian Bennett MD   benzonatate 100 mg Oral TID PRN Christian Bennett MD   benztropine 1 mg Intramuscular Q8H PRN Christian Bennett MD   carbamide peroxide 5 drop Left Ear BID PRN Nivia Lock MD   cloZAPine 25 mg Oral BID Christian Bennett MD   cloZAPine 50 mg Oral BID Christian Bennett MD   docusate sodium 100 mg Oral BID PRN Christian Bennett MD   EPINEPHrine PF 0 15 mg Intramuscular Once PRN Christian Bennett MD   fluticasone-vilanterol 1 puff Inhalation Daily Christian Bennett MD   ketotifen 1 drop Right Eye BID PRN Christian Bennett MD   levothyroxine 125 mcg Oral Early Morning Christian Bennett MD   magnesium hydroxide 30 mL Oral Daily PRN Christian Benentt MD   montelukast 10 mg Oral HS Christian Bennett MD   OLANZapine 5 mg Intramuscular Q8H PRN Christian Bennett MD   OLANZapine 5 mg Oral Q8H PRN Christian Bennett MD   ondansetron 4 mg Oral Q6H PRN Christian Bennett MD   pantoprazole 40 mg Oral Early Morning Christian Bennett MD   polyethylene glycol 17 g Oral Daily PRN Christian Bennett MD   polyvinyl alcohol 1 drop Both Eyes Q3H PRN Christian Bennett MD   sertraline 175 mg Oral Daily Christian Bennett MD   sucralfate 1,000 mg Oral BID Sera Roth MD   theophylline 200 mg Oral Daily Christian Bennett MD   tiotropium 18 mcg Inhalation Daily Christian Bennett MD   traZODone 25 mg Oral HS PRN Christian Bennett MD        ** Please Note: Dictation voice to text software may have been used in the creation of this document   **    Grace Palacios, MD  04/02/20  4:09 PM

## 2020-04-02 NOTE — PROGRESS NOTES
60 y o  was seen today, on unit  According to the patient and nursing staff, the following is reported: nursing reports that she has been isolative and not attending groups often  Has been reporting situational factors leading to depression, minimally improving mood and anxiety  Sleep and appetite are fair  Denies SI/HI, denies A/V arrieta  Has been complaint with medications and no agitation witnessed         Mental Status Evaluation:  Appearance:  Marginal/poor hygiene and Poor eye contact   Behavior:  cooperative, friendly and evasive   Mood:  depressed   Affect: constricted   Speech: Normal rate and Soft   Thought Process:  Goal directed and coherent   Thought Content:  Does not verbalize delusional material   Perceptual Disturbances: Denies hallucinations and does not appear to be responding to internal stimuli   Risk Potential: No suicidal or homicidal ideation   Orientation:   Oriented x 3   Insight and judgment poor      Current Facility-Administered Medications:  acetaminophen 325 mg Oral Q6H PRN Zetta Killer, MD   acetaminophen 650 mg Oral Q6H PRN Zetta Killer, MD   acetaminophen 650 mg Oral Q8H PRN Navia Killer, MD   albuterol 2 puff Inhalation Q4H PRN Dalton Garner MD   aluminum-magnesium hydroxide-simethicone 15 mL Oral Q4H PRN Zetta Killer, MD   ammonium lactate 1 application Topical BID PRN Navia Killer, MD   benzonatate 100 mg Oral TID PRN Navia MD Pascual   benztropine 1 mg Intramuscular Q8H PRN Mograntta Killer, MD   carbamide peroxide 5 drop Left Ear BID PRN Jorge CombMD daysi   cloZAPine 25 mg Oral BID Zetta Killer, MD   cloZAPine 50 mg Oral BID Zetta Killer, MD   docusate sodium 100 mg Oral BID PRN Morgantta Killer, MD   EPINEPHrine PF 0 15 mg Intramuscular Once PRN Navia MD Pascual   fluticasone-vilanterol 1 puff Inhalation Daily Dalton Garner MD   ketotifen 1 drop Right Eye BID PRN Dalton Garner MD   levothyroxine 125 mcg Oral Early Morning Navia Killer, MD   magnesium hydroxide 30 mL Oral Daily PRN Navia MD Pascual montelukast 10 mg Oral HS Deedee Muir MD   OLANZapine 5 mg Intramuscular Q8H PRN Deedee Muir MD   OLANZapine 5 mg Oral Q8H PRN Deedee Muir MD   ondansetron 4 mg Oral Q6H PRN Deedee Muir MD   pantoprazole 40 mg Oral Early Morning Deedee Muir MD   polyethylene glycol 17 g Oral Daily PRN Deedee Muir MD   polyvinyl alcohol 1 drop Both Eyes Q3H PRN Deedee Muir MD   sertraline 175 mg Oral Daily Deedee Muir MD   sucralfate 1,000 mg Oral BID Juan Carlos Shonda, MD   theophylline 200 mg Oral Daily Deedee Muir MD   tiotropium 18 mcg Inhalation Daily Deedee Muir MD   traZODone 25 mg Oral HS PRN Deedee Muir MD      Patient Active Problem List    Diagnosis Date Noted    At risk for aspiration 10/09/2019    Noncompliant with deep vein thrombosis (DVT) prophylaxis 07/08/2019    Excessive cerumen in left ear canal 01/27/2019    Lipoma of right upper extremity 01/27/2019    Abnormal CT of the chest 01/18/2019    Schizoaffective disorder, bipolar type ([address] ) 01/15/2019    Acquired hypothyroidism 01/15/2019    Gastroesophageal reflux disease without esophagitis 01/15/2019    Compression fracture of L4 lumbar vertebra 02/24/2016    Ventral hernia 02/24/2016    Acute on chronic respiratory failure with hypoxia ([address] [facility] ) 02/24/2016    COPD with asthma ([address] ) 02/22/2016    Tobacco use disorder, continuous 02/22/2016        Plan: Medications reviewed, benefits/risks discussed with patient, condition reviewed with treatment team  Routine monitoring will occur on unit, evaluation of effectiveness and side effects of medications  Will continue with current meds

## 2020-04-02 NOTE — PROGRESS NOTES
Maggie maintained on ongoing fall and SAFE precaution  Kalia Sabot in bed with eyes closed, breath even and unlabored   On O2 with humidifier @1L/m via nasal cannula   Continues rounding implemented   No somatic complaint overnight  No PRN needed for sleep aid   No indication of pain or discomfort   No respiratory distress   Will continue to monitor

## 2020-04-02 NOTE — PLAN OF CARE
Problem: Alteration in Thoughts and Perception  Goal: Verbalize thoughts and feelings  Description  Interventions:  - Promote a nonjudgmental and trusting relationship with the patient through active listening and therapeutic communication  - Assess patient's level of functioning, behavior and potential for risk  - Engage patient in 1 on 1 interactions for a minimum of 15 minutes each session  - Encourage patient to express fears, feelings, frustrations, and discuss symptoms    - Roll[place] patient to reality, help patient recognize reality-based thinking   - Administer medications as ordered and assess for potential side effects  - Provide the patient education related to the signs and symptoms of the illness and desired effects of prescribed medications  Outcome: Progressing  Goal: Agree to be compliant with medication regime, as prescribed and report medication side effects  Description  Interventions:  - Offer appropriate PRN medication and supervise ingestion; conduct aims, as needed   Outcome: Progressing     Problem: Alteration in Thoughts and Perception  Goal: Attend and participate in unit activities, including therapeutic, recreational, and educational groups  Description  Interventions:  - Provide therapeutic and educational activities daily, encourage attendance and participation, and document same in the medical record     CERTIFIED PEER SPECIALIST INTERVENTIONS:    Complete peer assessment with patient to assess their needs and identify their goals to complete while in the recovery program as well as once discharged into the community  Patient will complete WRAP Plan, Crisis Plan and 5 Life Domains  Patient will attend 50% of groups offered on the unit  Patient will complete a goal card weekly      Outcome: Not Progressing  Goal: Complete daily ADLs, including personal hygiene independently, as able  Description  Interventions:  - Observe, teach, and assist patient with ADLS  - Monitor and promote a balance of rest/activity, with adequate nutrition and elimination   Outcome: Not Progressing     Problem: Depression  Goal: Refrain from isolation  Description  Interventions:  - Develop a trusting relationship   - Encourage socialization   Outcome: Not Ulysses Melbourne is preoccupied w/her L ear clog  Was seen by [facility] who tried to reassure her that ear canal looks clear & free of any problem  She came out for supper medicine  Ate poorly @ meal, 10% (mashed potatoes) & Ensure  She is unkempt & overdue a shower, but, ambivalent about this undertaking tonight; ultimately elected to do it tomorrow even though offered assistance tonight  Made her aware the same help may not be available tomorrow, but, she still stuck to her decision  She is isolative to her room & bed sleeping in free time  Did not respond to invitations to Women's Group or PM Group

## 2020-04-02 NOTE — PROGRESS NOTES
2145 Gregoria Leger did use the [employer] achieving consistent volumes of 1250ml  She was a bit somatic late in shift; again w/complaint of being "off balance" & needing staff to walk her from dining room to her room after HS snack & HS medicine (except the refused Singulair)  Wearing now her QHS humidified nasal O2 @ 1L for bed

## 2020-04-02 NOTE — TREATMENT TEAM
04/02/20 0900   Activity/Group Checklist   Group Community meeting  ([place] )   Attendance Did not attend   Attendance Duration (min) 16-30   Affect/Mood IRMA

## 2020-04-02 NOTE — PLAN OF CARE
Problem: Alteration in Thoughts and Perception  Goal: Agree to be compliant with medication regime, as prescribed and report medication side effects  Description  Interventions:  - Offer appropriate PRN medication and supervise ingestion; conduct aims, as needed   Outcome: Progressing     Problem: Alteration in Thoughts and Perception  Goal: Attend and participate in unit activities, including therapeutic, recreational, and educational groups  Description  Interventions:  - Provide therapeutic and educational activities daily, encourage attendance and participation, and document same in the medical record     CERTIFIED PEER SPECIALIST INTERVENTIONS:    Complete peer assessment with patient to assess their needs and identify their goals to complete while in the recovery program as well as once discharged into the community  Patient will complete WRAP Plan, Crisis Plan and 5 Life Domains  Patient will attend 50% of groups offered on the unit  Patient will complete a goal card weekly  Outcome: Not Progressing     Problem: Depression  Goal: Refrain from isolation  Description  Interventions:  - Develop a trusting relationship   - Encourage socialization   Outcome: Not Progressing     Problem: Nutrition/Hydration-ADULT  Goal: Nutrient/Hydration intake appropriate for improving, restoring or maintaining nutritional needs  Description  Monitor and assess patient's nutrition/hydration status for malnutrition  Collaborate with interdisciplinary team and initiate plan and interventions as ordered  Monitor patient's weight and dietary intake as ordered or per policy  Utilize nutrition screening tool and intervene as necessary  Determine patient's food preferences and provide high-protein, high-caloric foods as appropriate       INTERVENTIONS:  - Monitor oral intake, urinary output, labs, and treatment plans  - Assess nutrition and hydration status and recommend course of action  - Evaluate amount of meals eaten  - Assist patient with eating if necessary   - Allow adequate time for meals  - Recommend/ encourage appropriate diets, oral nutritional supplements, and vitamin/mineral supplements  - Order, calculate, and assess calorie counts as needed  - Recommend, monitor, and adjust tube feedings and TPN/PPN based on assessed needs  - Assess need for intravenous fluids  - Provide specific nutrition/hydration education as appropriate  - Include patient/family/caregiver in decisions related to nutrition  Outcome: Not Progressing     2015 Mat Cast was visible early in shift sitting out in phone chair when it was not in use  She otherwise is isolative to her bed where sleeps  Came out for supper medicine & meal  But, ate nothing from tray, only drank her Ensure  Did not respond to invitation to PM group offerings  She is pleasant, but, a bit withdrawn  She is unkempt

## 2020-04-02 NOTE — TREATMENT TEAM
04/02/20 1100   Activity/Group Checklist   Group   (IMR/Open Forum )   Attendance Did not attend   Attendance Duration (min) 46-60   Affect/Mood IRMA

## 2020-04-02 NOTE — PLAN OF CARE
Problem: Alteration in Thoughts and Perception  Goal: Verbalize thoughts and feelings  Description  Interventions:  - Promote a nonjudgmental and trusting relationship with the patient through active listening and therapeutic communication  - Assess patient's level of functioning, behavior and potential for risk  - Engage patient in 1 on 1 interactions for a minimum of 15 minutes each session  - Encourage patient to express fears, feelings, frustrations, and discuss symptoms    - [place] patient to reality, help patient recognize reality-based thinking   - Administer medications as ordered and assess for potential side effects  - Provide the patient education related to the signs and symptoms of the illness and desired effects of prescribed medications  Outcome: Progressing  Goal: Agree to be compliant with medication regime, as prescribed and report medication side effects  Description  Interventions:  - Offer appropriate PRN medication and supervise ingestion; conduct aims, as needed   Outcome: Progressing     Problem: Ineffective Coping  Goal: Patient/Family verbalizes awareness of resources  Outcome: Progressing  Goal: Understands least restrictive measures  Description  Interventions:  - Utilize least restrictive behavior  Outcome: Progressing     Problem: Risk for Self Injury/Neglect  Goal: Treatment Goal: Remain safe during length of stay, learn and adopt new coping skills, and be free of self-injurious ideation, impulses and acts at the time of discharge  Outcome: Progressing  Goal: Verbalize thoughts and feelings  Description  Interventions:  - Assess and re-assess patient's lethality and potential for self-injury  - Engage patient in 1:1 interactions, daily, for a minimum of 15 minutes  - Encourage patient to express feelings, fears, frustrations, hopes  - Establish rapport/trust with patient   Outcome: Progressing  Goal: Refrain from harming self  Description  Interventions:  - Monitor patient closely, per order  - Develop a trusting relationship  - Supervise medication ingestion, monitor effects and side effects   Outcome: Progressing     Problem: Depression  Goal: Verbalize thoughts and feelings  Description  Interventions:  - Assess and re-assess patient's level of risk   - Engage patient in 1:1 interactions, daily, for a minimum of 15 minutes   - Encourage patient to express feelings, fears, frustrations, hopes   Outcome: Progressing     Problem: Alteration in Orientation  Goal: Interact with staff daily  Description  Interventions:  - Assess and re-assess patient's level of orientation  - Engage patient in 1 on 1 interactions, daily, for a minimum of 15 minutes   - Establish rapport/trust with patient   Outcome: Progressing     Problem: PAIN - ADULT  Goal: Verbalizes/displays adequate comfort level or baseline comfort level  Description  Interventions:  - Encourage patient to monitor pain and request assistance  - Assess pain using appropriate pain scale  - Administer analgesics based on type and severity of pain and evaluate response  - Implement non-pharmacological measures as appropriate and evaluate response  - Consider cultural and social influences on pain and pain management  - Notify physician/advanced practitioner if interventions unsuccessful or patient reports new pain  Outcome: Progressing     Problem: SAFETY ADULT  Goal: Patient will remain free of falls  Description  INTERVENTIONS:  - Assess patient frequently for physical needs  -  Identify cognitive and physical deficits and behaviors that affect risk of falls    -  [place] fall precautions as indicated by assessment   - Educate patient/family on patient safety including physical limitations  - Instruct patient to call for assistance with activity based on assessment  - Modify environment to reduce risk of injury  - Consider OT/PT consult to assist with strengthening/mobility  Outcome: Progressing     Problem: Nutrition/Hydration-ADULT  Goal: Nutrient/Hydration intake appropriate for improving, restoring or maintaining nutritional needs  Description  Monitor and assess patient's nutrition/hydration status for malnutrition  Collaborate with interdisciplinary team and initiate plan and interventions as ordered  Monitor patient's weight and dietary intake as ordered or per policy  Utilize nutrition screening tool and intervene as necessary  Determine patient's food preferences and provide high-protein, high-caloric foods as appropriate       INTERVENTIONS:  - Monitor oral intake, urinary output, labs, and treatment plans  - Assess nutrition and hydration status and recommend course of action  - Evaluate amount of meals eaten  - Assist patient with eating if necessary   - Allow adequate time for meals  - Recommend/ encourage appropriate diets, oral nutritional supplements, and vitamin/mineral supplements  - Order, calculate, and assess calorie counts as needed  - Recommend, monitor, and adjust tube feedings and TPN/PPN based on assessed needs  - Assess need for intravenous fluids  - Provide specific nutrition/hydration education as appropriate  - Include patient/family/caregiver in decisions related to nutrition  Outcome: Progressing     Problem: Alteration in Thoughts and Perception  Goal: Treatment Goal: Gain control of psychotic behaviors/thinking, reduce/eliminate presenting symptoms and demonstrate improved reality functioning upon discharge  Outcome: Not Progressing  Goal: Attend and participate in unit activities, including therapeutic, recreational, and educational groups  Description  Interventions:  - Provide therapeutic and educational activities daily, encourage attendance and participation, and document same in the medical record     CERTIFIED PEER SPECIALIST INTERVENTIONS:    Complete peer assessment with patient to assess their needs and identify their goals to complete while in the recovery program as well as once discharged into the community  Patient will complete WRAP Plan, Crisis Plan and 5 Life Domains  Patient will attend 50% of groups offered on the unit  Patient will complete a goal card weekly      Outcome: Not Progressing  Goal: Recognize dysfunctional thoughts, communicate reality-based thoughts at the time of discharge  Description  Interventions:  - Provide medication and psycho-education to assist patient in compliance and developing insight into his/her illness   Outcome: Not Progressing     Problem: Ineffective Coping  Goal: Participates in unit activities  Description  Interventions:  - Provide therapeutic environment   - Provide required programming   - Redirect inappropriate behaviors   Outcome: Not Progressing     Problem: Risk for Self Injury/Neglect  Goal: Attend and participate in unit activities, including therapeutic, recreational, and educational groups  Description  Interventions:  - Provide therapeutic and educational activities daily, encourage attendance and participation, and document same in the medical record  - Obtain collateral information, encourage visitation and family involvement in care   Outcome: Not Progressing  Goal: Recognize maladaptive responses and adopt new coping mechanisms  Outcome: Not Progressing  Goal: Complete daily ADLs, including personal hygiene independently, as able  Description  Interventions:  - Observe, teach, and assist patient with ADLS  - Monitor and promote a balance of rest/activity, with adequate nutrition and elimination  Outcome: Not Progressing     Problem: Depression  Goal: Treatment Goal: Demonstrate behavioral control of depressive symptoms, verbalize feelings of improved mood/affect, and adopt new coping skills prior to discharge  Outcome: Not Progressing  Goal: Refrain from isolation  Description  Interventions:  - Develop a trusting relationship   - Encourage socialization   Outcome: Not Progressing  Goal: Refrain from self-neglect  Outcome: Not Progressing Problem: Anxiety  Goal: Anxiety is at manageable level  Description  Interventions:  - Assess and monitor patient's anxiety level  - Monitor for signs and symptoms of anxiety both physical and emotional (heart palpitations, chest pain, shortness of breath, headaches, nausea, feeling jumpy, restlessness, irritable, apprehensive)  - Collaborate with interdisciplinary team and initiate plan and interventions as ordered  - [place] patient to unit/surroundings  - Explain treatment plan  - Encourage participation in care  - Encourage verbalization of concerns/fears  - Identify coping mechanisms  - Assist in developing anxiety-reducing skills  - Administer/offer alternative therapies  - Limit or eliminate stimulants  Outcome: Not Progressing   Mostly isolative to her bed, out for meds and meals with encouragement, did not attend any groups  Says she can't do anything because "the blocked youtube in my ear is throwing off my equilibrium"  Ate 100% of breakfast but did not eat lunch  No other behaviors or issues noted  Continue to monitor

## 2020-04-03 NOTE — PROGRESS NOTES
2130 Mariana Hernandes was willing to do her Incentive Spirometry & had exemplary performance, no breaths less than 1250ml, some reaching 1400ml  Did have HS snack  But, when came to window for HS medicine became anxious; "Oh! I'm having a bad patch  I'm afraid I'll fall " Clutching @ [facility] sill  Asked a peer passing by to stand by her, then, walk her to her room  Accepted Debrox 5 gtts L ear @ 2118 for continued sensation of clogged L ear  Wearing now her QHS humidified nasal O2 @ 1L for bed

## 2020-04-03 NOTE — TREATMENT TEAM
04/03/20 1500   Activity/Group Checklist   Group   (Goal Card Planning )   Attendance Did not attend   Attendance Duration (min) 16-30   Affect/Mood IRMA

## 2020-04-03 NOTE — PLAN OF CARE
Problem: Alteration in Thoughts and Perception  Goal: Verbalize thoughts and feelings  Description  Interventions:  - Promote a nonjudgmental and trusting relationship with the patient through active listening and therapeutic communication  - Assess patient's level of functioning, behavior and potential for risk  - Engage patient in 1 on 1 interactions for a minimum of 15 minutes each session  - Encourage patient to express fears, feelings, frustrations, and discuss symptoms    - [place] patient to reality, help patient recognize reality-based thinking   - Administer medications as ordered and assess for potential side effects  - Provide the patient education related to the signs and symptoms of the illness and desired effects of prescribed medications  Outcome: Progressing  Goal: Agree to be compliant with medication regime, as prescribed and report medication side effects  Description  Interventions:  - Offer appropriate PRN medication and supervise ingestion; conduct aims, as needed   Outcome: Progressing  Goal: Attend and participate in unit activities, including therapeutic, recreational, and educational groups  Description  Interventions:  - Provide therapeutic and educational activities daily, encourage attendance and participation, and document same in the medical record     CERTIFIED PEER SPECIALIST INTERVENTIONS:    Complete peer assessment with patient to assess their needs and identify their goals to complete while in the recovery program as well as once discharged into the community  Patient will complete WRAP Plan, Crisis Plan and 5 Life Domains  Patient will attend 50% of groups offered on the unit  Patient will complete a goal card weekly      Outcome: Not Progressing     Problem: Depression  Goal: Refrain from isolation  Description  Interventions:  - Develop a trusting relationship   - Encourage socialization   Outcome: Jannette Tolliver has been isolative to her room in between scheduled meals and medications  Compliant with both  No somatic complaints voiced to this writer at this time  Napped for the majority of the shift  Continues to lack insight to her illness  Brightens on approach but remains flat and isolative to her room  No groups attended  Behaviors controlled  Will continue to monitor

## 2020-04-03 NOTE — PROGRESS NOTES
Maggie maintained on ongoing fall and SAFE precaution   Laying in bed with eyes closed, breath even and unlabored   On O2 with humidifier @1L/m via nasal cannula  Continues rounding implemented   No somatic complaint overnight  No PRN needed for sleep aid   No indication of pain or discomfort   No respiratory distress   Will continue to monitor

## 2020-04-03 NOTE — PROGRESS NOTES
Psychiatry Progress Note [address] 60 y o  female MRN: [id number]  [facility] Encounter: [id number]  Code Status: Level 1 - Full Code    PCP: Bonnee Favre, PA-C    Date of Admission:  7/23/2019 1730   Date of Service:  04/03/20  Patient Active Problem List   Diagnosis    COPD with asthma ([facility] )    Tobacco use disorder, continuous    Compression fracture of L4 lumbar vertebra    Ventral hernia    Acute on chronic respiratory failure with hypoxia ([facility] )    Schizoaffective disorder, bipolar type ([facility] )    Acquired hypothyroidism    Gastroesophageal reflux disease without esophagitis    Abnormal CT of the chest    Excessive cerumen in left ear canal    Lipoma of right upper extremity    Noncompliant with deep vein thrombosis (DVT) prophylaxis    At risk for aspiration    Ear ache     Diagnosis schizoaffective bipolar    Assessment   Overall Status: improving    Certification Statement: The patient will continue to require additional inpatient hospital stay to assess ability to maintain improvement by attending to ADLs and taking showers regular and see how she does on outing with family and the overnight pass at the personal care home once the virus restrictions are lifted   Acceptance by patient: accepting now  Ricardo Chavarria in recovery: living at the [address] [place] [address] Rd soon   Understanding of medications: yes     Involved in reintegration process: on hold due to [address] in relationship with psychiatrist: trusting now     Medication changes    None today    Non-pharmacological treatments   Continue with individual, group, milieu and occupational therapy using recovery principles and psycho-education about accepting illness and the need for treatment   Encourage to take showers twice a week and cooperate with treatment and adl skills as per her behav   Plan   Another therapeutic pass with sister now that she did well  with the assigned staff escort to the park but it is now on hold due to [address] virus   Prepare for the [address] [place] [address] Rd and encouraged to accept  rather than refuse it    Safety   Safety and communication plan established to target dynamic risk factors discussed above  Discharge Plan   · back to a [facility] at [address]   Patient was found sitting back on her bed in her usual posture in the morning but does attend some of the groups  She was checked out by medical for complains of left earache and they checked her ear canal which was unremarkable but patient things that she may still have ear wax even though they did not find any and could even see the eardrum with no signs of infection  She was encouraged to use the eardrops which he has been using but she things that the medical doctors did not diagnose her right! This is part of her psychosomatic symptoms and she still questions her pills by examining them as she did again this morning does not trust the staff with the medications unless she checks herself  She was also complaining about having "panic attack" the other day in group which was not clearly a panic attack as reported by staff who witnessed a and she was encouraged to try and attend groups again  She also questions if anyone has tampered with her inhalant or her oxygen concentrator which is an ongoing thing  However she is compliant with medications but does not eat meals all the time but has not  lost  weight  She is sleeping well  He does not admit to any overt delusional believes even though she does come across as somewhat suspicious and paranoid and preoccupied  She is still worried that she might die from corona virus or from shortness of breath or from choking on food or from heart attack and continues to demand certain tests even if they are not warranted but is usually redirectable with support, reassurance and gentle redirection     She continues to be concerned about catching the corona virus  Tolerating medications with no side effects  Review of systems unremarkable today other than for her psychosomatic complaints which are fixed and preoccupation with her left ear she believes it is filled with earwax as opposed to medical evidence  She was reminded to keep attending groups     Mental Status Exam  Appearance: age appropriate and older than stated age better groomed with combed hair sitting on bed somewhat anxious appearing not very well groomed or kept as she has not taken showers in a few days now  Behavior: evasive and guarded superficially friendly pleasant anxious and suspicious at times   Speech: delayed, increased latency of response and tangential   Mood: anxious, euphoric and irritable   Affect: increased in intensity, increased in range, mood-congruent and redirectable  Thought Process: circumstantial, concrete, goal directed, illogical and perserverative tendency to have stilted speech  Thought Content: delusions  grandiose and persecutory no overt delusions elicited but appears suspicious of staff tampering with her medications inhalant etc   Still psychosomatic about fear of death from corona virus heart attack etc needing frequent support and reassurance  Continues to believe that she has earwax in her left ear  No current suicidal homicidal thoughts intent or plans reported  No phobias obsessions or compulsions otherwise  Deangelo Heckler   Perceptual Disturbances: None   Risk Potential: Inability to care for herself and refusal to take showers regularly  Sensorium: person, place, time/date, situation, day of week, month of year, year and time  Cognition: grossly intact aware of current events like the corona virus, no deficit in language, no deficit in recent or remote memory  Consciousness: alert and awake   Attention: Fair  Intellect: Average  Insight: Limited  Judgment: fair   Motor Activity: no abnormal movements      Vitals  Temp:  [97 °F (36 1 °C)-97 5 °F (36 4 °C)] 97 °F (36 1 °C)  HR: [72-90] 90  Resp:  [16] 16  BP: (94-95)/(61-68) 94/68  SpO2:  [91 %] 91 %  No intake or output data in the 24 hours ending 04/03/20 0819    Lab Results: No New Labs Available For Today          Current Facility-Administered Medications:  acetaminophen 325 mg Oral Q6H PRN Zander Yanez MD   acetaminophen 650 mg Oral Q6H PRN Zander Yanez MD   acetaminophen 650 mg Oral Q8H PRN Zander Yanez MD   albuterol 2 puff Inhalation Q4H PRN Zander Yanez MD   aluminum-magnesium hydroxide-simethicone 15 mL Oral Q4H PRN Zander Yanez MD   ammonium lactate 1 application Topical BID PRN Zander Yanez MD   benzonatate 100 mg Oral TID PRN Zander Yanez MD   benztropine 1 mg Intramuscular Q8H PRN Zander Yanez MD   carbamide peroxide 5 drop Left Ear BID PRN Therese Brooke MD   cloZAPine 25 mg Oral BID Zander Yanez MD   cloZAPine 50 mg Oral BID Zander Yanez MD   docusate sodium 100 mg Oral BID PRN Zander Yanez MD   EPINEPHrine PF 0 15 mg Intramuscular Once PRN Zander Yanez MD   fluticasone-vilanterol 1 puff Inhalation Daily Zander Yanez MD   ketotifen 1 drop Right Eye BID PRN Zander Yanez MD   levothyroxine 125 mcg Oral Early Morning Zander Yanez MD   magnesium hydroxide 30 mL Oral Daily PRN Zander Yanez MD   montelukast 10 mg Oral HS Zander Yanez MD   OLANZapine 5 mg Intramuscular Q8H PRN Zander Yanez MD   OLANZapine 5 mg Oral Q8H PRN Zander Yanez MD   ondansetron 4 mg Oral Q6H PRN Zander Yanez MD   pantoprazole 40 mg Oral Early Morning Zander Yanez MD   polyethylene glycol 17 g Oral Daily PRN Zander Yanez MD   polyvinyl alcohol 1 drop Both Eyes Q3H PRN Zander Yanez MD   sertraline 175 mg Oral Daily Zander Yanez MD   sucralfate 1,000 mg Oral BID Tomás Layne MD   theophylline 200 mg Oral Daily Zander Yanez MD   tiotropium 18 mcg Inhalation Daily Zander Yanez MD   traZODone 25 mg Oral HS PRN Zander Yanez MD       Counseling / Coordination of Care: Total floor / unit time spent today 15 minutes   Greater than 50% of total time was spent with the patient and / or family counseling and / or somewhat receptive to supportive listening and teaching positive coping skills to deal with symptom mangement  Patient's Rights, confidentiality and exceptions to confidentiality, use of automated medical record, Jeb Patrick staff access to medical record, and consent to treatment reviewed

## 2020-04-03 NOTE — PROGRESS NOTES
04/03/20 0800   Team Meeting   Meeting Type Daily Rounds   Team Members Present   Team Members Present Physician;Nurse;; Other (Discipline and Name)   Physician Team Member Dr Monika Muse, RN   Care Management Team Member ASHLYN Sawyer   Other (Discipline and Name) JER PhillipsW; Tin Nur, SHANIKA     Ate 100/0/10  Somatic  Believes she has a blocked ear that is affecting her equilibrium  Last shower 3/29  No groups

## 2020-04-04 NOTE — PROGRESS NOTES
2145 Priscilla Long did perform her Incentive Spirometry achieving mostly 1250ml volume  Came out to kitchen for HS snack & back to dining room to eat it  But, when came to window for HS medicine, again panicking, feared would fall, clutched onto passing peer's arm & had him walk her to room door where took self to bed  Debrox 5 gtts L ear given @ 2141 per her request for perception of blocked ear  Wearing now her QHS humidified nasal O2 @ 1L for bed

## 2020-04-04 NOTE — PROGRESS NOTES
Progress Note - Behavioral Health   Nolberto Galvin 60 y o  female MRN: [id number]  [facility] Encounter: [id number]    The patient was seen for continuing care and reviewed with treatment team     Vital signs in last 24 hours:  Temp:  [97 °F (36 1 °C)-97 4 °F (36 3 °C)] 97 4 °F (36 3 °C)  HR:  [78-81] 78  Resp:  [18] 18  BP: (116-122)/(56-58) 116/58    Mental Status Evaluation:    Appearance Adequate hygiene and grooming   Behavior guarded   Mood anxious   Speech Normal rate and volume   Affect appropriate   Thought Processes Goal directed and coherent   Thought Content Does not verbalize delusional material   Perceptual Disturbances Denies hallucinations and does not appear to be responding to internal stimuli   Risk Potential Suicidal/Homicidal Ideation - No evidence of suicidal or homicidal ideation and Patient does not verbalize suicidal or homicidal ideation  Risk of Violence - No evidence of risk for violence found on assessment  Risk of Self Mutilation - No evidence of risk for self mutilation found on assessment   Sensorium oriented to person, place, time/date and situation   Cognition/Memory recent and remote memory grossly intact   Consciousness alert and awake   Attention/Concentration attention span and concentration are age appropriate   Insight limited   Judgement fair   Muscle Strength and Gait/Station normal muscle strength and normal muscle tone, normal gait/station and normal balance   Motor Activity no abnormal movements       Progress Toward Goals:   Patient states that mood is "okay "patient has not been attending groups this a [employer] Patient states she is eating and sleeping well  Denies any medication side effects  No problems reported by staff  Recommended Treatment: Continue with pharmacotherapy, group therapy, milieu therapy and occupational therapy    The patient will be maintained on the following medications:    Current Facility-Administered Medications:  acetaminophen 325 mg Oral Q6H PRN Deep Durand MD   acetaminophen 650 mg Oral Q6H PRN Deep Durand MD   acetaminophen 650 mg Oral Q8H PRN Deep Durand MD   albuterol 2 puff Inhalation Q4H PRN Deep Durand MD   aluminum-magnesium hydroxide-simethicone 15 mL Oral Q4H PRN Deep Durand MD   ammonium lactate 1 application Topical BID PRN Deep Durand MD   benzonatate 100 mg Oral TID PRN Deep Durand MD   benztropine 1 mg Intramuscular Q8H PRN Deep Durand MD   carbamide peroxide 5 drop Left Ear BID PRN Tyler Rothman MD   cloZAPine 25 mg Oral BID Deep Durand MD   cloZAPine 50 mg Oral BID Deep Durand MD   docusate sodium 100 mg Oral BID PRN Deep Durand MD   EPINEPHrine PF 0 15 mg Intramuscular Once PRN Deep Durand MD   fluticasone-vilanterol 1 puff Inhalation Daily Deep Durand MD   ketotifen 1 drop Right Eye BID PRN Deep Durand MD   levothyroxine 125 mcg Oral Early Morning Deep Durand MD   magnesium hydroxide 30 mL Oral Daily PRN Deep Durand MD   montelukast 10 mg Oral HS Deep Durand MD   OLANZapine 5 mg Intramuscular Q8H PRN Deep Durand MD   OLANZapine 5 mg Oral Q8H PRN Deep Durand MD   ondansetron 4 mg Oral Q6H PRN Deep Durand MD   pantoprazole 40 mg Oral Early Morning Deep Durand MD   polyethylene glycol 17 g Oral Daily PRN Deep Durand MD   polyvinyl alcohol 1 drop Both Eyes Q3H PRN Deep Durand MD   sertraline 175 mg Oral Daily Deep Durand MD   sucralfate 1,000 mg Oral BID Rosario Jay MD   theophylline 200 mg Oral Daily Deep Durand MD   tiotropium 18 mcg Inhalation Daily Deep Durand MD   traZODone 25 mg Oral HS PRN Deep Durand MD       Schizoaffective disorder, bipolar type ([address] )

## 2020-04-04 NOTE — PLAN OF CARE
Problem: Alteration in Thoughts and Perception  Goal: Verbalize thoughts and feelings  Description  Interventions:  - Promote a nonjudgmental and trusting relationship with the patient through active listening and therapeutic communication  - Assess patient's level of functioning, behavior and potential for risk  - Engage patient in 1 on 1 interactions for a minimum of 15 minutes each session  - Encourage patient to express fears, feelings, frustrations, and discuss symptoms    - [place] patient to reality, help patient recognize reality-based thinking   - Administer medications as ordered and assess for potential side effects  - Provide the patient education related to the signs and symptoms of the illness and desired effects of prescribed medications  Outcome: Progressing  Goal: Agree to be compliant with medication regime, as prescribed and report medication side effects  Description  Interventions:  - Offer appropriate PRN medication and supervise ingestion; conduct aims, as needed   Outcome: Progressing  Goal: Complete daily ADLs, including personal hygiene independently, as able  Description  Interventions:  - Observe, teach, and assist patient with ADLS  - Monitor and promote a balance of rest/activity, with adequate nutrition and elimination   Outcome: Progressing     Problem: Alteration in Thoughts and Perception  Goal: Recognize dysfunctional thoughts, communicate reality-based thoughts at the time of discharge  Description  Interventions:  - Provide medication and psycho-education to assist patient in compliance and developing insight into his/her illness   Outcome: Not Progressing     Problem: Depression  Goal: Refrain from isolation  Description  Interventions:  - Develop a trusting relationship   - Encourage socialization   Outcome: Not Progressing     Elianejungcyndi Lozano was in her room most of the shift  She only came out for medication and meal  Needed prompting for pills  Took without difficulty   Ate 100% of her meal and drank 100% of Ensure  Sat in the chair by the phone for a brief period of time  Back to her room after meal  No shower or BM  "I feel dizzy and weak"  Encourage not to lay in bed all day and to drink water  Did not attend evening group  Took HS medication  Did not eat snack  Continue to monitor  Precautions maintained

## 2020-04-04 NOTE — PROGRESS NOTES
Sleeping at this time, no distress noted  O2 on at 1L via NC  All precautions in place and maintained at this time   Monitoring continues

## 2020-04-04 NOTE — PLAN OF CARE
Problem: Alteration in Thoughts and Perception  Goal: Treatment Goal: Gain control of psychotic behaviors/thinking, reduce/eliminate presenting symptoms and demonstrate improved reality functioning upon discharge  Outcome: Progressing  Goal: Verbalize thoughts and feelings  Description  Interventions:  - Promote a nonjudgmental and trusting relationship with the patient through active listening and therapeutic communication  - Assess patient's level of functioning, behavior and potential for risk  - Engage patient in 1 on 1 interactions for a minimum of 15 minutes each session  - Encourage patient to express fears, feelings, frustrations, and discuss symptoms    - [place] patient to reality, help patient recognize reality-based thinking   - Administer medications as ordered and assess for potential side effects  - Provide the patient education related to the signs and symptoms of the illness and desired effects of prescribed medications  Outcome: Progressing  Goal: Agree to be compliant with medication regime, as prescribed and report medication side effects  Description  Interventions:  - Offer appropriate PRN medication and supervise ingestion; conduct aims, as needed   Outcome: Progressing  Goal: Attend and participate in unit activities, including therapeutic, recreational, and educational groups  Description  Interventions:  - Provide therapeutic and educational activities daily, encourage attendance and participation, and document same in the medical record     CERTIFIED PEER SPECIALIST INTERVENTIONS:    Complete peer assessment with patient to assess their needs and identify their goals to complete while in the recovery program as well as once discharged into the community  Patient will complete WRAP Plan, Crisis Plan and 5 Life Domains  Patient will attend 50% of groups offered on the unit  Patient will complete a goal card weekly      Outcome: Progressing  Goal: Recognize dysfunctional thoughts, communicate reality-based thoughts at the time of discharge  Description  Interventions:  - Provide medication and psycho-education to assist patient in compliance and developing insight into his/her illness   Outcome: Progressing  Goal: Complete daily ADLs, including personal hygiene independently, as able  Description  Interventions:  - Observe, teach, and assist patient with ADLS  - Monitor and promote a balance of rest/activity, with adequate nutrition and elimination   Outcome: Progressing    Pt seclusive to her room except for medications  Continues to request assistance for tasks she can complete  Reported depression 6/10 and anxiety 4/4  Stated, "my problems are circumstantial"  No c/o distress  Appetite 25 for breakfast and 100% for lunch  Will continue to monitor and maintain q 7 min checks

## 2020-04-04 NOTE — PLAN OF CARE
Problem: Alteration in Thoughts and Perception  Goal: Verbalize thoughts and feelings  Description  Interventions:  - Promote a nonjudgmental and trusting relationship with the patient through active listening and therapeutic communication  - Assess patient's level of functioning, behavior and potential for risk  - Engage patient in 1 on 1 interactions for a minimum of 15 minutes each session  - Encourage patient to express fears, feelings, frustrations, and discuss symptoms    - [place] patient to reality, help patient recognize reality-based thinking   - Administer medications as ordered and assess for potential side effects  - Provide the patient education related to the signs and symptoms of the illness and desired effects of prescribed medications  Outcome: Progressing  Goal: Agree to be compliant with medication regime, as prescribed and report medication side effects  Description  Interventions:  - Offer appropriate PRN medication and supervise ingestion; conduct aims, as needed   Outcome: Progressing  Goal: Complete daily ADLs, including personal hygiene independently, as able  Description  Interventions:  - Observe, teach, and assist patient with ADLS  - Monitor and promote a balance of rest/activity, with adequate nutrition and elimination   Outcome: Progressing     Problem: Alteration in Thoughts and Perception  Goal: Attend and participate in unit activities, including therapeutic, recreational, and educational groups  Description  Interventions:  - Provide therapeutic and educational activities daily, encourage attendance and participation, and document same in the medical record     CERTIFIED PEER SPECIALIST INTERVENTIONS:    Complete peer assessment with patient to assess their needs and identify their goals to complete while in the recovery program as well as once discharged into the community  Patient will complete WRAP Plan, Crisis Plan and 5 Life Domains      Patient will attend 50% of groups offered on the unit  Patient will complete a goal card weekly  Outcome: Not Progressing     Problem: Depression  Goal: Refrain from isolation  Description  Interventions:  - Develop a trusting relationship   - Encourage socialization   Outcome: Jannette Saurav Resendiz continues somatic w/complaint of L ear canal clog affecting her balance  Again getting panicked when @ medicine window for supper dose, saying was going to fall & waving for staff to help her walk, though, had made it through a shower earlier (after setup by [facility]), coming to dining room for meal (ate25%, 4 crackers, bites egg salad, sherbet & Ensure) without any help to ambulate  Isolative to room & bed in free time where sits crossed legged on bed peering out or sleeps  Did not respond to invitations to any evening programming

## 2020-04-05 NOTE — PROGRESS NOTES
Maggie maintained on ongoing fall and SAFE precaution  [place] Link in bed with eyes closed, breath even and unlabored   On O2 with humidifier @1L/m via nasal cannula  Continues rounding implemented   No somatic complaint overnight  No PRN needed for sleep aid   No indication of pain or discomfort   No respiratory distress   Will continue to monitor

## 2020-04-05 NOTE — PLAN OF CARE
Problem: Alteration in Thoughts and Perception  Goal: Treatment Goal: Gain control of psychotic behaviors/thinking, reduce/eliminate presenting symptoms and demonstrate improved reality functioning upon discharge  Outcome: Progressing  Goal: Verbalize thoughts and feelings  Description  Interventions:  - Promote a nonjudgmental and trusting relationship with the patient through active listening and therapeutic communication  - Assess patient's level of functioning, behavior and potential for risk  - Engage patient in 1 on 1 interactions for a minimum of 15 minutes each session  - Encourage patient to express fears, feelings, frustrations, and discuss symptoms    - [place] patient to reality, help patient recognize reality-based thinking   - Administer medications as ordered and assess for potential side effects  - Provide the patient education related to the signs and symptoms of the illness and desired effects of prescribed medications  Outcome: Progressing  Goal: Agree to be compliant with medication regime, as prescribed and report medication side effects  Description  Interventions:  - Offer appropriate PRN medication and supervise ingestion; conduct aims, as needed   Outcome: Progressing  Goal: Attend and participate in unit activities, including therapeutic, recreational, and educational groups  Description  Interventions:  - Provide therapeutic and educational activities daily, encourage attendance and participation, and document same in the medical record     CERTIFIED PEER SPECIALIST INTERVENTIONS:    Complete peer assessment with patient to assess their needs and identify their goals to complete while in the recovery program as well as once discharged into the community  Patient will complete WRAP Plan, Crisis Plan and 5 Life Domains  Patient will attend 50% of groups offered on the unit  Patient will complete a goal card weekly      Outcome: Progressing  Goal: Recognize dysfunctional thoughts, communicate reality-based thoughts at the time of discharge  Description  Interventions:  - Provide medication and psycho-education to assist patient in compliance and developing insight into his/her illness   Outcome: Progressing  Goal: Complete daily ADLs, including personal hygiene independently, as able  Description  Interventions:  - Observe, teach, and assist patient with ADLS  - Monitor and promote a balance of rest/activity, with adequate nutrition and elimination   Outcome: Progressing     Problem: Ineffective Coping  Goal: Identifies ineffective coping skills  Outcome: Progressing  Goal: Identifies healthy coping skills  Outcome: Progressing  Goal: Demonstrates healthy coping skills  Outcome: Progressing  Goal: Participates in unit activities  Description  Interventions:  - Provide therapeutic environment   - Provide required programming   - Redirect inappropriate behaviors   Outcome: Progressing  Goal: Patient/Family participate in treatment and DC plans  Description  Interventions:  - Provide therapeutic environment  Outcome: Progressing  Goal: Patient/Family verbalizes awareness of resources  Outcome: Progressing  Goal: Understands least restrictive measures  Description  Interventions:  - Utilize least restrictive behavior  Outcome: Progressing     Problem: Risk for Self Injury/Neglect  Goal: Treatment Goal: Remain safe during length of stay, learn and adopt new coping skills, and be free of self-injurious ideation, impulses and acts at the time of discharge  Outcome: Progressing  Goal: Verbalize thoughts and feelings  Description  Interventions:  - Assess and re-assess patient's lethality and potential for self-injury  - Engage patient in 1:1 interactions, daily, for a minimum of 15 minutes  - Encourage patient to express feelings, fears, frustrations, hopes  - Establish rapport/trust with patient   Outcome: Progressing  Goal: Refrain from harming self  Description  Interventions:  - Monitor patient closely, per order  - Develop a trusting relationship  - Supervise medication ingestion, monitor effects and side effects   Outcome: Progressing  Goal: Attend and participate in unit activities, including therapeutic, recreational, and educational groups  Description  Interventions:  - Provide therapeutic and educational activities daily, encourage attendance and participation, and document same in the medical record  - Obtain collateral information, encourage visitation and family involvement in care   Outcome: Progressing  Goal: Recognize maladaptive responses and adopt new coping mechanisms  Outcome: Progressing  Goal: Complete daily ADLs, including personal hygiene independently, as able  Description  Interventions:  - Observe, teach, and assist patient with ADLS  - Monitor and promote a balance of rest/activity, with adequate nutrition and elimination  Outcome: Progressing     Problem: Depression  Goal: Treatment Goal: Demonstrate behavioral control of depressive symptoms, verbalize feelings of improved mood/affect, and adopt new coping skills prior to discharge  Outcome: Progressing  Goal: Verbalize thoughts and feelings  Description  Interventions:  - Assess and re-assess patient's level of risk   - Engage patient in 1:1 interactions, daily, for a minimum of 15 minutes   - Encourage patient to express feelings, fears, frustrations, hopes   Outcome: Progressing  Goal: Refrain from isolation  Description  Interventions:  - Develop a trusting relationship   - Encourage socialization   Outcome: Progressing  Goal: Refrain from self-neglect  Outcome: Progressing     Problem: Anxiety  Goal: Anxiety is at manageable level  Description  Interventions:  - Assess and monitor patient's anxiety level  - Monitor for signs and symptoms of anxiety both physical and emotional (heart palpitations, chest pain, shortness of breath, headaches, nausea, feeling jumpy, restlessness, irritable, apprehensive)     - Collaborate with interdisciplinary team and initiate plan and interventions as ordered  - [place] patient to unit/surroundings  - Explain treatment plan  - Encourage participation in care  - Encourage verbalization of concerns/fears  - Identify coping mechanisms  - Assist in developing anxiety-reducing skills  - Administer/offer alternative therapies  - Limit or eliminate stimulants  Outcome: Progressing     Problem: PAIN - ADULT  Goal: Verbalizes/displays adequate comfort level or baseline comfort level  Description  Interventions:  - Encourage patient to monitor pain and request assistance  - Assess pain using appropriate pain scale  - Administer analgesics based on type and severity of pain and evaluate response  - Implement non-pharmacological measures as appropriate and evaluate response  - Consider cultural and social influences on pain and pain management  - Notify physician/advanced practitioner if interventions unsuccessful or patient reports new pain  Outcome: Progressing     Problem: SAFETY ADULT  Goal: Patient will remain free of falls  Description  INTERVENTIONS:  - Assess patient frequently for physical needs  -  Identify cognitive and physical deficits and behaviors that affect risk of falls    -  [place] fall precautions as indicated by assessment   - Educate patient/family on patient safety including physical limitations  - Instruct patient to call for assistance with activity based on assessment  - Modify environment to reduce risk of injury  - Consider OT/PT consult to assist with strengthening/mobility  Outcome: Progressing     Problem: RESPIRATORY - ADULT  Goal: Achieves optimal ventilation and oxygenation  Description  INTERVENTIONS:  - Assess for changes in respiratory status  - Assess for changes in mentation and behavior  - Position to facilitate oxygenation and minimize respiratory effort  - Oxygen administration by appropriate delivery method based on oxygen saturation (per order) or ABGs  - Initiate smoking cessation education as indicated  - Encourage broncho-pulmonary hygiene including cough, deep breathe, Incentive Spirometry  - Assess the need for suctioning and aspirate as needed  - Assess and instruct to report SOB or any respiratory difficulty  - Respiratory Therapy support as indicated  Outcome: Progressing     Problem: SLEEP DISTURBANCE  Goal: Will exhibit normal sleeping pattern  Description  Interventions:  -  Assess the patients sleep pattern, noting recent changes  - Administer medication as ordered  - Decrease environmental stimuli, including noise, as appropriate during the night  - Encourage the patient to actively participate in unit groups and or exercise during the day to enhance ability to achieve adequate sleep at night  - Assess the patient, in the morning, encouraging a description of sleep experience  Outcome: Progressing     Problem: Nutrition/Hydration-ADULT  Goal: Nutrient/Hydration intake appropriate for improving, restoring or maintaining nutritional needs  Description  Monitor and assess patient's nutrition/hydration status for malnutrition  Collaborate with interdisciplinary team and initiate plan and interventions as ordered  Monitor patient's weight and dietary intake as ordered or per policy  Utilize nutrition screening tool and intervene as necessary  Determine patient's food preferences and provide high-protein, high-caloric foods as appropriate       INTERVENTIONS:  - Monitor oral intake, urinary output, labs, and treatment plans  - Assess nutrition and hydration status and recommend course of action  - Evaluate amount of meals eaten  - Assist patient with eating if necessary   - Allow adequate time for meals  - Recommend/ encourage appropriate diets, oral nutritional supplements, and vitamin/mineral supplements  - Order, calculate, and assess calorie counts as needed  - Recommend, monitor, and adjust tube feedings and TPN/PPN based on assessed needs  - Assess need for intravenous fluids  - Provide specific nutrition/hydration education as appropriate  - Include patient/family/caregiver in decisions related to nutrition  Outcome: Progressing     Pt seclusive to her room and out for meals only  Must be prompted to leave room and care for herself and tasks  Frequently asking "to bring meds here" or others for help  Reported "not feeling right"  Afebrile  Debrox drops given in left ear x1  Participation encouraged  Appetite poor  Refused all groups  Affect is blunted  Depression 6/10 and anxiety 4/4  Will continue to monitor and maintain q 7 min checks

## 2020-04-05 NOTE — PROGRESS NOTES
Progress Note - Behavioral Health   Drew Prime 60 y o  female MRN: [id number]  Unit/Bed#: [facility] 110-02 Encounter: [id number]    The patient was seen for continuing care and reviewed with treatment team     Vital signs in last 24 hours:  Temp:  [97 2 °F (36 2 °C)-97 9 °F (36 6 °C)] 97 9 °F (36 6 °C)  HR:  [64-92] 78  Resp:  [16-18] 18  BP: ()/(62) 120/62    Mental Status Evaluation:    Appearance disheveled   Behavior calm and cooperative   Mood anxious   Speech Normal rate and volume   Affect constricted   Thought Processes Goal directed and coherent   Thought Content Somatic delusions   Perceptual Disturbances Denies hallucinations and does not appear to be responding to internal stimuli   Risk Potential Suicidal/Homicidal Ideation - No evidence of suicidal or homicidal ideation and Patient does not verbalize suicidal or homicidal ideation  Risk of Violence - No evidence of risk for violence found on assessment  Risk of Self Mutilation - No evidence of risk for self mutilation found on assessment   Sensorium oriented to person, place, time/date and situation   Cognition/Memory recent and remote memory grossly intact   Consciousness alert and awake   Attention/Concentration attention span and concentration are age appropriate   Insight poor   Judgement poor   Muscle Strength and Gait/Station normal muscle strength and normal muscle tone, normal gait/station and normal balance   Motor Activity no abnormal movements       Progress Toward Goals:  Patient observed lying in bed  Patient verbalizes somatic complaints related to somatic delusions  States he slept well overnight and is eating meals  Did not participate in group  No medication side effects  No Issues reported by staff  Recommended Treatment: Continue with pharmacotherapy, group therapy, milieu therapy and occupational therapy    The patient will be maintained on the following medications:    Current Facility-Administered Medications:  acetaminophen 325 mg Oral Q6H PRN Jaz Beasley MD   acetaminophen 650 mg Oral Q6H PRN Jaz Beasley MD   acetaminophen 650 mg Oral Q8H PRN Jaz Beasley MD   albuterol 2 puff Inhalation Q4H PRN Jaz Beasley MD   aluminum-magnesium hydroxide-simethicone 15 mL Oral Q4H PRN Jaz Beasley MD   ammonium lactate 1 application Topical BID PRN Jaz Beasley MD   benzonatate 100 mg Oral TID PRN Jaz Beasley MD   benztropine 1 mg Intramuscular Q8H PRN Jaz Beasley MD   carbamide peroxide 5 drop Left Ear BID PRN Marielos Enriquez MD   cloZAPine 25 mg Oral BID Jaz Beasley MD   cloZAPine 50 mg Oral BID Jaz Beasley MD   docusate sodium 100 mg Oral BID PRN Jaz Beasley MD   EPINEPHrine PF 0 15 mg Intramuscular Once PRN Jaz Beasley MD   fluticasone-vilanterol 1 puff Inhalation Daily Jaz Beasley MD   ketotifen 1 drop Right Eye BID PRN Jaz Beasley MD   levothyroxine 125 mcg Oral Early Morning Jaz Beasley MD   magnesium hydroxide 30 mL Oral Daily PRN Jaz Beasley MD   montelukast 10 mg Oral HS Jaz Beasley MD   OLANZapine 5 mg Intramuscular Q8H PRN Jaz Beasley MD   OLANZapine 5 mg Oral Q8H PRN Jaz Beasley MD   ondansetron 4 mg Oral Q6H PRN Jaz Besaley MD   pantoprazole 40 mg Oral Early Morning Jaz Beasley MD   polyethylene glycol 17 g Oral Daily PRN Jaz Beasley MD   polyvinyl alcohol 1 drop Both Eyes Q3H PRN Jaz Beasley MD   sertraline 175 mg Oral Daily Jaz Beasley MD   sucralfate 1,000 mg Oral BID Maggie Andrade MD   theophylline 200 mg Oral Daily Jaz Beasley MD   tiotropium 18 mcg Inhalation Daily Jaz Beasley MD   traZODone 25 mg Oral HS PRN Jaz Beasley MD       Schizoaffective disorder, bipolar type ([facility] [address] )

## 2020-04-05 NOTE — PROGRESS NOTES
Did incentive spirometer  She was able to achieve 1000 ml's to 1250 ml's  Needs much encouragement to drink fluids and get out of bed

## 2020-04-06 NOTE — PROGRESS NOTES
Psychiatry Progress Note [address] 60 y o  female MRN: [id number]  Unit/Bed#: [facility] [address] Encounter: [id number]  Code Status: Level 1 - Full Code    PCP: Anne Stern PA-C    Date of Admission:  7/23/2019 1881   Date of Service:  04/06/20  Patient Active Problem List   Diagnosis    COPD with asthma ([facility] [address] )    Tobacco use disorder, continuous    Compression fracture of L4 lumbar vertebra    Ventral hernia    Acute on chronic respiratory failure with hypoxia ([facility] [address] )    Schizoaffective disorder, bipolar type ([facility] )    Acquired hypothyroidism    Gastroesophageal reflux disease without esophagitis    Abnormal CT of the chest    Excessive cerumen in left ear canal    Lipoma of right upper extremity    Noncompliant with deep vein thrombosis (DVT) prophylaxis    At risk for aspiration    Ear ache     Diagnosis schizoaffective bipolar    Assessment   Overall Status: improving    Certification Statement: The patient will continue to require additional inpatient hospital stay to assess ability to maintain improvement by attending to ADLs and taking showers regular and see how she does on outing with family and the overnight pass at the personal care home once the virus restrictions are lifted   Acceptance by patient: accepting now  Brigido Persons in recovery: living at the [address] [place] [address] Rd soon   Understanding of medications: yes     Involved in reintegration process: on hold due to [address] in relationship with psychiatrist: trusting now     Medication changes    None today    Non-pharmacological treatments   Continue with individual, group, milieu and occupational therapy using recovery principles and psycho-education about accepting illness and the need for treatment   Encourage to take showers twice a week and cooperate with treatment and adl skills as per her behav   Plan   Another therapeutic pass with sister now that she did well  with the assigned staff escort to the park but it is now on hold due to [address] virus   Prepare for the [address] [place] [address] Rd and encouraged to accept  rather than refuse it    Safety   Safety and communication plan established to target dynamic risk factors discussed above  Discharge Plan   · back to a [facility] at [address]   Patient is still psychosomatic and she still questions her pills by examining them as she  does not trust the staff with the medications unless she checks herself  She also questions if anyone has tampered with her inhalant or her oxygen concentrator which is an ongoing thing  However she is compliant with medications but does not eat meals all the time but has not  lost  weight  She is sleeping well  He does not admit to any overt delusional believes even though she does come across as somewhat suspicious and paranoid and preoccupied  She is still worried that she might die from corona virus or from shortness of breath or from choking on food or from heart attack and continues to demand certain tests even if they are not warranted but is usually redirectable with support, reassurance and gentle redirection   Tolerating medications with no side effects  Review of systems unremarkable today other than for her psychosomatic complaints which are fixed and preoccupation with her left ear she believes it is filled with earwax as opposed to medical evidence  She was reminded to keep attending groups  Eats ok and sleeps well and compliant with meds so far    Today she is asking if she could go to about 1 beyond the personal care home rather than to the [place] or the [facility] but I told her that she needs money to pay for it    Mental Status Exam  Appearance: age appropriate and older than stated age better groomed with combed hair sitting on bed somewhat anxious appearing not very well groomed or kept as she has not taken showers in a few days now  Behavior: evasive and guarded superficially friendly pleasant anxious and suspicious at times   Speech: delayed, increased latency of response and tangential   Mood: anxious, euphoric and irritable   Affect: increased in intensity, increased in range, mood-congruent and redirectable  Thought Process: circumstantial, concrete, goal directed, illogical and perserverative tendency to have stilted speech  Thought Content: delusions  grandiose and persecutory no overt delusions elicited but appears suspicious of staff tampering with her medications inhalant etc   Still psychosomatic about fear of death from corona virus heart attack etc needing frequent support and reassurance  Continues to believe that she has earwax in her left ear  No current suicidal homicidal thoughts intent or plans reported  No phobias obsessions or compulsions otherwise  Goran Po   Perceptual Disturbances: None   Risk Potential: Inability to care for herself and refusal to take showers regularly  Sensorium: person, place, time/date, situation, day of week, month of year, year and time  Cognition: grossly intact aware of current events like the corona virus, no deficit in language, no deficit in recent or remote memory  Consciousness: alert and awake   Attention: Fair  Intellect: Average  Insight: Limited  Judgment: fair   Motor Activity: no abnormal movements      Vitals  Temp:  [97 6 °F (36 4 °C)-97 7 °F (36 5 °C)] 97 7 °F (36 5 °C)  HR:  [73-96] 73  Resp:  [14-18] 18  BP: ()/(56-66) 99/56  SpO2:  [91 %-93 %] 93 %  No intake or output data in the 24 hours ending 04/06/20 0814    Lab Results: No New Labs Available For Today          Current Facility-Administered Medications:  acetaminophen 325 mg Oral Q6H PRN Carmell MD Ankit   acetaminophen 650 mg Oral Q6H PRN Carmell MD Ankit   acetaminophen 650 mg Oral Q8H PRN Lettyl MD Ankit   albuterol 2 puff Inhalation Q4H PRN Shi Pham MD   aluminum-magnesium hydroxide-simethicone 15 mL Oral Q4H PRN Carsusanal MD Ankit   ammonium lactate 1 application Topical BID PRN [facility] Ankit, MD   benzonatate 100 mg Oral TID PRN [facility], MD   benztropine 1 mg Intramuscular Q8H PRN [facility], MD   carbamide peroxide 5 drop Left Ear BID PRN Ronnie Bourne, MD   cloZAPine 25 mg Oral BID [facility], MD   cloZAPine 50 mg Oral BID [facility], MD   docusate sodium 100 mg Oral BID PRN [facility], MD   EPINEPHrine PF 0 15 mg Intramuscular Once PRN [facility], MD   fluticasone-vilanterol 1 puff Inhalation Daily [facility], MD   ketotifen 1 drop Right Eye BID PRN [facility], MD   levothyroxine 125 mcg Oral Early Morning [facility], MD   magnesium hydroxide 30 mL Oral Daily PRN [facility], MD   montelukast 10 mg Oral HS [facility], MD   OLANZapine 5 mg Intramuscular Q8H PRN [facility], MD   OLANZapine 5 mg Oral Q8H PRN [facility], MD   ondansetron 4 mg Oral Q6H PRN [facility], MD   pantoprazole 40 mg Oral Early Morning [facility], MD   polyethylene glycol 17 g Oral Daily PRN [facility], MD   polyvinyl alcohol 1 drop Both Eyes Q3H PRN [facility], MD   sertraline 175 mg Oral Daily [facility], MD   sucralfate 1,000 mg Oral BID Janece Hashimoto, MD   theophylline 200 mg Oral Daily [facility], MD   tiotropium 18 mcg Inhalation Daily [facility], MD   traZODone 25 mg Oral HS PRN [facility], MD       Counseling / Coordination of Care: Total floor / unit time spent today 15 minutes  Greater than 50% of total time was spent with the patient and / or family counseling and / or somewhat receptive to supportive listening and teaching positive coping skills to deal with symptom mangement  Patient's Rights, confidentiality and exceptions to confidentiality, use of automated medical record, Jeb Patrick staff access to medical record, and consent to treatment reviewed

## 2020-04-06 NOTE — PLAN OF CARE

## 2020-04-06 NOTE — PROGRESS NOTES
04/06/20 1054   Team Meeting   Meeting Type Daily Rounds   Team Members Present   Team Members Present Physician;Nurse;; Other (Discipline and Name)   Physician Team Member Dr Shane Berg Team Member Ame Bergeron RN   Care Management Team Member ASHLYN Strauss   Other (Discipline and Name) SHANIKA Kent   Patient/Family Present   Patient Present Yes   Patient's Family Present No     No change  Remains isolative, only attended 1 group this weekend  Patient fixated on ear, remains having somatic complaints

## 2020-04-06 NOTE — PROGRESS NOTES
Individual appears to being sleeping comfortably, no signs of distress noted throughout the night  Oxygen has been maintained @ 1 Liter via nasal canula  Maintained on continual rounding, will continue to monitor

## 2020-04-06 NOTE — PLAN OF CARE
Problem: Alteration in Thoughts and Perception  Goal: Verbalize thoughts and feelings  Description  Interventions:  - Promote a nonjudgmental and trusting relationship with the patient through active listening and therapeutic communication  - Assess patient's level of functioning, behavior and potential for risk  - Engage patient in 1 on 1 interactions for a minimum of 15 minutes each session  - Encourage patient to express fears, feelings, frustrations, and discuss symptoms    - [place] patient to reality, help patient recognize reality-based thinking   - Administer medications as ordered and assess for potential side effects  - Provide the patient education related to the signs and symptoms of the illness and desired effects of prescribed medications  Outcome: Progressing     Problem: Alteration in Orientation  Goal: Interact with staff daily  Description  Interventions:  - Assess and re-assess patient's level of orientation  - Engage patient in 1 on 1 interactions, daily, for a minimum of 15 minutes   - Establish rapport/trust with patient   Outcome: Progressing     Problem: SAFETY ADULT  Goal: Patient will remain free of falls  Description  INTERVENTIONS:  - Assess patient frequently for physical needs  -  Identify cognitive and physical deficits and behaviors that affect risk of falls    -  [place] fall precautions as indicated by assessment   - Educate patient/family on patient safety including physical limitations  - Instruct patient to call for assistance with activity based on assessment  - Modify environment to reduce risk of injury  - Consider OT/PT consult to assist with strengthening/mobility  Outcome: Progressing     Problem: RESPIRATORY - ADULT  Goal: Achieves optimal ventilation and oxygenation  Description  INTERVENTIONS:  - Assess for changes in respiratory status  - Assess for changes in mentation and behavior  - Position to facilitate oxygenation and minimize respiratory effort  - Oxygen administration by appropriate delivery method based on oxygen saturation (per order) or ABGs  - Initiate smoking cessation education as indicated  - Encourage broncho-pulmonary hygiene including cough, deep breathe, Incentive Spirometry  - Assess the need for suctioning and aspirate as needed  - Assess and instruct to report SOB or any respiratory difficulty  - Respiratory Therapy support as indicated  Outcome: Progressing     Problem: Alteration in Thoughts and Perception  Goal: Attend and participate in unit activities, including therapeutic, recreational, and educational groups  Description  Interventions:  - Provide therapeutic and educational activities daily, encourage attendance and participation, and document same in the medical record     CERTIFIED PEER SPECIALIST INTERVENTIONS:    Complete peer assessment with patient to assess their needs and identify their goals to complete while in the recovery program as well as once discharged into the community  Patient will complete WRAP Plan, Crisis Plan and 5 Life Domains  Patient will attend 50% of groups offered on the unit  Patient will complete a goal card weekly  Outcome: Not Progressing  Goal: Complete daily ADLs, including personal hygiene independently, as able  Description  Interventions:  - Observe, teach, and assist patient with ADLS  - Monitor and promote a balance of rest/activity, with adequate nutrition and elimination   Outcome: Not Progressing     Problem: Depression  Goal: Refrain from isolation  Description  Interventions:  - Develop a trusting relationship   - Encourage socialization   Outcome: Not Progressing     Katty Bowles was asleep at the beginning of the shift  Was in her room most of the time  Came out when prompted for medication  Took pills without difficulty  Ate 50% of her meal and drank her Ensure  No shower or BM this shift  Encouraged to drink water and move around more instead of laying in bed all the time   Did not attend evening group  Took HS medication with prompting  Ate snack before going to her room  Debrox drops to her left ear prior to going to sleep  Oxygen saturation 91% prior to oxygen 1 liter via nasal cannula applied  Continue to monitor  Precautions maintained

## 2020-04-06 NOTE — PLAN OF CARE
Problem: Alteration in Thoughts and Perception  Goal: Agree to be compliant with medication regime, as prescribed and report medication side effects  Description  Interventions:  - Offer appropriate PRN medication and supervise ingestion; conduct aims, as needed   Outcome: Progressing  Goal: Attend and participate in unit activities, including therapeutic, recreational, and educational groups  Description  Interventions:  - Provide therapeutic and educational activities daily, encourage attendance and participation, and document same in the medical record     CERTIFIED PEER SPECIALIST INTERVENTIONS:    Complete peer assessment with patient to assess their needs and identify their goals to complete while in the recovery program as well as once discharged into the community  Patient will complete WRAP Plan, Crisis Plan and 5 Life Domains  Patient will attend 50% of groups offered on the unit  Patient will complete a goal card weekly  Outcome: Not Progressing  Goal: Complete daily ADLs, including personal hygiene independently, as able  Description  Interventions:  - Observe, teach, and assist patient with ADLS  - Monitor and promote a balance of rest/activity, with adequate nutrition and elimination   Outcome: Not Progressing     Problem: Ineffective Coping  Goal: Participates in unit activities  Description  Interventions:  - Provide therapeutic environment   - Provide required programming   - Redirect inappropriate behaviors   Outcome: Not Iggy Durand has been isolative to her room for the majority of the shift, no groups attended  Only coming out for medications and meals  Appetite poor, eating 25% of breakfast and lunch  No somatic complaints verbalized to this writer other than fatigue  Encouraged Yoanna Higginbotham to attend groups but declined without giving reason  Otherwise, behaviors remain controlled  Will continue to monitor

## 2020-04-06 NOTE — TREATMENT TEAM
Patient declined      04/06/20 1100   Activity/Group Checklist   Group   (IMR/SMART Goals )   Attendance Did not attend   Attendance Duration (min) 46-60   Affect/Mood IRMA

## 2020-04-06 NOTE — TREATMENT TEAM
Patient declined      04/06/20 0900   Activity/Group Checklist   Group Community meeting  (Goal Card Review )   Attendance Did not attend   Attendance Duration (min) 16-30   Affect/Mood IRMA

## 2020-04-07 NOTE — TREATMENT TEAM
04/07/20 1400   Activity/Group Checklist   Group   (Recovery Workshop )   Attendance Did not attend   Attendance Duration (min) 46-60   Affect/Mood IRMA

## 2020-04-07 NOTE — PLAN OF CARE
Problem: Alteration in Thoughts and Perception  Goal: Agree to be compliant with medication regime, as prescribed and report medication side effects  Description  Interventions:  - Offer appropriate PRN medication and supervise ingestion; conduct aims, as needed   Outcome: Progressing  Goal: Attend and participate in unit activities, including therapeutic, recreational, and educational groups  Description  Interventions:  - Provide therapeutic and educational activities daily, encourage attendance and participation, and document same in the medical record     CERTIFIED PEER SPECIALIST INTERVENTIONS:    Complete peer assessment with patient to assess their needs and identify their goals to complete while in the recovery program as well as once discharged into the community  Patient will complete WRAP Plan, Crisis Plan and 5 Life Domains  Patient will attend 50% of groups offered on the unit  Patient will complete a goal card weekly  Outcome: Not Progressing  Goal: Complete daily ADLs, including personal hygiene independently, as able  Description  Interventions:  - Observe, teach, and assist patient with ADLS  - Monitor and promote a balance of rest/activity, with adequate nutrition and elimination   Outcome: Not Progressing     Problem: Depression  Goal: Refrain from isolation  Description  Interventions:  - Develop a trusting relationship   - Encourage socialization   Outcome: Not Timo Nair has been isolative to her room for the majority of the shift, no groups attended  Only coming out for medications and meals  Appetite poor, eating 25% of breakfast and lunch  No somatic complaints verbalized to this writer other than fatigue, disheveled in appearance, not tending to her hygiene today and was irritable after her treatment team meeting, isolating herself in her room in between meals  Otherwise, behaviors remain controlled  Will continue to monitor

## 2020-04-07 NOTE — PROGRESS NOTES
04/07/20 1320   Team Meeting   Meeting Type Tx Team Meeting   Initial Conference Date 04/07/20   Next Conference Date 04/07/20   Team Members Present   Team Members Present Physician;Nurse;; Other (Discipline and Name)   Physician Team Member Dr Birgit Castro Team Member Desmond Dawn, RN   Care Management Team Member ASHLYN [place]   Other (Discipline and Name) [facility] [address]   Patient/Family Present   Patient Present Yes   Patient's Family Present No     Patient attended treatment team meeting this morning prepared without self-assessment  Patient's group attendance for last week was low due to patient reporting "fear because of my major panic attack last week " Team and patient completed risk assessment and the patient did not verbalize any desire to elope from the program  Patient verbalized understanding of consequences of eloping from treatment while on a commitment  Patient verbalized no further questions or concerns at the conclusion of the meeting  Next team meeting scheduled for 4/14/2020  Patient attended treatment plan however avoided any questioned asked and replied with another somatic complaint  Patient would like to go to [facility] and not [facility] as already discussed  Patient reminded that she was not accepted to [facility] due to lack of funds and non compliance  Patient reminded that the recommendation is a mental health supportive living such as [facility]  Patient remained argumentative and not listening to writer, physician, and RN

## 2020-04-07 NOTE — PLAN OF CARE
Problem: Alteration in Thoughts and Perception  Goal: Verbalize thoughts and feelings  Description  Interventions:  - Promote a nonjudgmental and trusting relationship with the patient through active listening and therapeutic communication  - Assess patient's level of functioning, behavior and potential for risk  - Engage patient in 1 on 1 interactions for a minimum of 15 minutes each session  - Encourage patient to express fears, feelings, frustrations, and discuss symptoms    - [place] patient to reality, help patient recognize reality-based thinking   - Administer medications as ordered and assess for potential side effects  - Provide the patient education related to the signs and symptoms of the illness and desired effects of prescribed medications  Outcome: Progressing  Goal: Agree to be compliant with medication regime, as prescribed and report medication side effects  Description  Interventions:  - Offer appropriate PRN medication and supervise ingestion; conduct aims, as needed   Outcome: Progressing     Problem: Alteration in Thoughts and Perception  Goal: Attend and participate in unit activities, including therapeutic, recreational, and educational groups  Description  Interventions:  - Provide therapeutic and educational activities daily, encourage attendance and participation, and document same in the medical record     CERTIFIED PEER SPECIALIST INTERVENTIONS:    Complete peer assessment with patient to assess their needs and identify their goals to complete while in the recovery program as well as once discharged into the community  Patient will complete WRAP Plan, Crisis Plan and 5 Life Domains  Patient will attend 50% of groups offered on the unit  Patient will complete a goal card weekly      Outcome: Not Progressing  Goal: Complete daily ADLs, including personal hygiene independently, as able  Description  Interventions:  - Observe, teach, and assist patient with ADLS  - Monitor and promote a balance of rest/activity, with adequate nutrition and elimination   Outcome: Not Progressing     Problem: Depression  Goal: Refrain from isolation  Description  Interventions:  - Develop a trusting relationship   - Encourage socialization   Outcome: Not Progressing     Problem: Nutrition/Hydration-ADULT  Goal: Nutrient/Hydration intake appropriate for improving, restoring or maintaining nutritional needs  Description  Monitor and assess patient's nutrition/hydration status for malnutrition  Collaborate with interdisciplinary team and initiate plan and interventions as ordered  Monitor patient's weight and dietary intake as ordered or per policy  Utilize nutrition screening tool and intervene as necessary  Determine patient's food preferences and provide high-protein, high-caloric foods as appropriate  INTERVENTIONS:  - Monitor oral intake, urinary output, labs, and treatment plans  - Assess nutrition and hydration status and recommend course of action  - Evaluate amount of meals eaten  - Assist patient with eating if necessary   - Allow adequate time for meals  - Recommend/ encourage appropriate diets, oral nutritional supplements, and vitamin/mineral supplements  - Order, calculate, and assess calorie counts as needed  - Recommend, monitor, and adjust tube feedings and TPN/PPN based on assessed needs  - Assess need for intravenous fluids  - Provide specific nutrition/hydration education as appropriate  - Include patient/family/caregiver in decisions related to nutrition  Outcome: Not Progressing     [address] is isolative to her room where sits crossed legged on bed peering out door or naps  She was out in [place] in phone chair early in shift when it was not in use  She is pleasant, but, describes feeling fatigued today  Disinterested in hygiene tonight  Did come up for supper medicine  Intake poor @ meal, 25% (ice cream, bites egg salad), but, had an Ensure   Has not responded to invitation to PM Group

## 2020-04-07 NOTE — PROGRESS NOTES
04/03/20 1100   Activity/Group Checklist   Group Other (Comment)  (IMR - Recovery Jeopardy)   Attendance Attended   Attendance Duration (min) 31-45   Interactions Did not interact   Affect/Mood Appropriate   Goals Achieved Increased hopefulness; Identified resources and support systems; Able to engage in interactions; Able to listen to others

## 2020-04-07 NOTE — TREATMENT TEAM
Patient declined      04/07/20 0900   Activity/Group Checklist   Group Community meeting  (Morning Meditation )   Attendance Did not attend   Attendance Duration (min) 16-30   Affect/Mood IRMA

## 2020-04-07 NOTE — PROGRESS NOTES
Sleeping with no distress noted  O2 on at 1L NC  All precautions in place and maintained    Monitoring continues

## 2020-04-07 NOTE — PROGRESS NOTES
Psychiatry Progress Note [address] 60 y o  female MRN: [id number]  Unit/Bed#: [place] [address] Encounter: [id number]  Code Status: Level 1 - Full Code    PCP: Kirk Mckeon PA-C    Date of Admission:  7/23/2019 1730   Date of Service:  04/07/20  Patient Active Problem List   Diagnosis    COPD with asthma ([facility] )    Tobacco use disorder, continuous    Compression fracture of L4 lumbar vertebra    Ventral hernia    Acute on chronic respiratory failure with hypoxia ([facility] [address] [facility] )    [facility], [facility]    Acquired hypothyroidism    Gastroesophageal reflux disease without esophagitis    Abnormal CT of the chest    Excessive cerumen in left ear canal    Lipoma of right upper extremity    Noncompliant with deep vein thrombosis (DVT) prophylaxis    At risk for aspiration    Ear ache     Diagnosis schizoaffective bipolar    Assessment   Overall Status: improving    Certification Statement: The patient will continue to require additional inpatient hospital stay to assess ability to maintain improvement by attending to ADLs and taking showers regular and see how she does on outing with family and the overnight pass at the personal care home once the virus restrictions are lifted   Acceptance by patient: accepting now  Vlad Graver in recovery: living at the [facility] soon   Understanding of medications: yes     Involved in reintegration process: on hold due to [address] in relationship with psychiatrist: trusting now     Medication changes    None today    Non-pharmacological treatments   Continue with individual, group, milieu and occupational therapy using recovery principles and psycho-education about accepting illness and the need for treatment   Encourage to take showers twice a week and cooperate with treatment and adl skills as per her behav   Plan   Another therapeutic pass with sister now that she did well  with the assigned staff escort to the park but it is now on hold due to [address] virus   Prepare for the [employer] and encouraged to accept  rather than refuse it    Safety   Safety and communication plan established to target dynamic risk factors discussed above  Discharge Plan   · back to a [facility] at [address]   Patient continues to have multiple psychosomatic complaints like a blocked left ear canal even though it was found to be clear when checked by medical last week  She continues to question the staff and examines her medications before she takes them and still believes that some of the staff her tampering with her oxygen concentrator as well as hurting her and  She continues to refuse to attend some of the groups claiming that she gets "panic attacks" despite showing any such symptomatology  She is now talking about going to the " [facility]personal care boarding home instead of going to [facility] or to the [facility] but I reminded her that it is usually private pay and she may not be able to afford it  She has not taken showers in the last few days has been passive-aggressive laying on her bed  She is still afraid that she may die from choking on her food or from corona virus off from heart attack or from shortness of breath and needs lot of verbal support and reassurance on a daily basis  She is eating fairly and has not gained or lost much weight as she picks on her food off and on  She is sleeping fairly well  She does not admit to any overt delusional beliefs or hallucinations but she continues to appear to harbor some underlying paranoia  Staff reports that she is asking peers to do things for her and I did discourage her from doing that      Mental Status Exam  Appearance: age appropriate and older than stated age, wearing a mask,  better groomed with combed hair sitting on bed somewhat anxious appearing not very well groomed or kept as she has not taken showers in a few days now  Behavior: evasive and guarded superficially friendly pleasant anxious and suspicious at times   Speech: delayed, increased latency of response and tangential   Mood: anxious, euphoric and irritable,    Affect: increased in intensity, increased in range, mood-congruent and redirectable  Thought Process: circumstantial, concrete, goal directed, illogical and perserverative tendency to have stilted speech  Thought Content: delusions  grandiose and persecutory no overt delusions elicited but appears suspicious of staff tampering with her medications inhalant etc   Still psychosomatic about fear of death from corona virus heart attack etc needing frequent support and reassurance  Continues to believe that she has earwax in her left ear  No current suicidal homicidal thoughts intent or plans reported  No phobias obsessions or compulsions otherwise  Frank Marks   Perceptual Disturbances: None   Risk Potential: Inability to care for herself and refusal to take showers regularly  Sensorium: person, place, time/date, situation, day of week, month of year, year and time  Cognition: grossly intact aware of current events like the corona virus, no deficit in language, no deficit in recent or remote memory  Consciousness: alert and awake   Attention: Fair  Intellect: Average  Insight: Limited  Judgment: fair   Motor Activity: no abnormal movements      Vitals  Temp:  [97 6 °F (36 4 °C)] 97 6 °F (36 4 °C)  HR:  [79-89] 79  Resp:  [16] 16  BP: (102-111)/(65-67) 102/65  SpO2:  [93 %-95 %] 93 %    Intake/Output Summary (Last 24 hours) at 4/7/2020 0751  Last data filed at 4/6/2020 1900  Gross per 24 hour   Intake    Output 0 ml   Net 0 ml       Lab Results: No New Labs Available For Today          Current Facility-Administered Medications:  acetaminophen 325 mg Oral Q6H PRN Jill Gayle MD   acetaminophen 650 mg Oral Q6H PRN Jill Gayle MD   acetaminophen 650 mg Oral Q8H PRN Jill Gayle MD   albuterol 2 puff Inhalation Q4H PRN Greer Beltran MD   aluminum-magnesium hydroxide-simethicone 15 mL Oral Q4H PRN Greer Beltran MD   ammonium lactate 1 application Topical BID PRN Greer Beltran MD   benzonatate 100 mg Oral TID PRN Greer Beltran MD   benztropine 1 mg Intramuscular Q8H PRN Greer Beltran MD   carbamide peroxide 5 drop Left Ear BID PRN Madelyn Dumas MD   cloZAPine 25 mg Oral BID Greer Beltran MD   cloZAPine 50 mg Oral BID Greer Beltran MD   docusate sodium 100 mg Oral BID PRN Greer Beltran MD   EPINEPHrine PF 0 15 mg Intramuscular Once PRN Greer Beltran MD   fluticasone-vilanterol 1 puff Inhalation Daily Greer Beltran MD   ketotifen 1 drop Right Eye BID PRN Greer Beltran MD   levothyroxine 125 mcg Oral Early Morning Greer Beltran MD   magnesium hydroxide 30 mL Oral Daily PRN Greer Beltran MD   montelukast 10 mg Oral HS Greer Beltran MD   OLANZapine 5 mg Intramuscular Q8H PRN Greer Beltran MD   OLANZapine 5 mg Oral Q8H PRN Greer Beltran MD   ondansetron 4 mg Oral Q6H PRN Greer Beltran MD   pantoprazole 40 mg Oral Early Morning Greer Beltran MD   polyethylene glycol 17 g Oral Daily PRN Greer Beltran MD   polyvinyl alcohol 1 drop Both Eyes Q3H PRN Greer Beltran MD   sertraline 175 mg Oral Daily Greer Beltran MD   sucralfate 1,000 mg Oral BID Kiya Neves MD   theophylline 200 mg Oral Daily Greer Beltran MD   tiotropium 18 mcg Inhalation Daily Greer Beltran MD   traZODone 25 mg Oral HS PRN Greer Beltran MD       Counseling / Coordination of Care: Total floor / unit time spent today 15 minutes  Greater than 50% of total time was spent with the patient and / or family counseling and / or somewhat receptive to supportive listening and teaching positive coping skills to deal with symptom mangement  Patient's Rights, confidentiality and exceptions to confidentiality, use of automated medical record, Jeb Patrick staff access to medical record, and consent to treatment reviewed

## 2020-04-07 NOTE — TREATMENT TEAM
Patient declined      04/07/20 1100   Activity/Group Checklist   Group   (IMR/Mindfulness )   Attendance Did not attend   Attendance Duration (min) 46-60   Affect/Mood IRMA

## 2020-04-07 NOTE — PROGRESS NOTES
04/07/20 0900   Team Meeting   Meeting Type Daily Rounds   Team Members Present   Team Members Present Physician;Nurse;; Other (Discipline and Name)   Physician Team Member Dr Mikal Plascencia Team Member Catherine Mata RN   Care Management Team Member Chito Humphreys MSW   Other (Discipline and Name) Shanta Jacques LCSW; Gilford Dull, CPS     Reports feeling fatigued  Not attending groups  Somatic  Slept

## 2020-04-08 NOTE — PLAN OF CARE
Problem: Alteration in Thoughts and Perception  Goal: Verbalize thoughts and feelings  Description  Interventions:  - Promote a nonjudgmental and trusting relationship with the patient through active listening and therapeutic communication  - Assess patient's level of functioning, behavior and potential for risk  - Engage patient in 1 on 1 interactions for a minimum of 15 minutes each session  - Encourage patient to express fears, feelings, frustrations, and discuss symptoms    - [place] patient to reality, help patient recognize reality-based thinking   - Administer medications as ordered and assess for potential side effects  - Provide the patient education related to the signs and symptoms of the illness and desired effects of prescribed medications  Outcome: Not Progressing  Goal: Attend and participate in unit activities, including therapeutic, recreational, and educational groups  Description  Interventions:  - Provide therapeutic and educational activities daily, encourage attendance and participation, and document same in the medical record     CERTIFIED PEER SPECIALIST INTERVENTIONS:    Complete peer assessment with patient to assess their needs and identify their goals to complete while in the recovery program as well as once discharged into the community  Patient will complete WRAP Plan, Crisis Plan and 5 Life Domains  Patient will attend 50% of groups offered on the unit  Patient will complete a goal card weekly  Outcome: Not Progressing     Problem: Ineffective Coping  Goal: Identifies healthy coping skills  Outcome: Not Progressing  Goal: Demonstrates healthy coping skills  Outcome: Not Progressing  Goal: Participates in unit activities  Description  Interventions:  - Provide therapeutic environment   - Provide required programming   - Redirect inappropriate behaviors   Outcome: Not Progressing  Patient not attending groups nor participating in any unit activities   Patient remains in bed except for meals and snacks  Writer offered numerous times to work with Patient face to face to complete WRAP Plan and [facility] Relapse Prevention however, Patient declines each time  Writer has exhausted many interventions to date

## 2020-04-08 NOTE — PLAN OF CARE
Problem: Alteration in Thoughts and Perception  Goal: Attend and participate in unit activities, including therapeutic, recreational, and educational groups  Description  Interventions:  - Provide therapeutic and educational activities daily, encourage attendance and participation, and document same in the medical record     CERTIFIED PEER SPECIALIST INTERVENTIONS:    Complete peer assessment with patient to assess their needs and identify their goals to complete while in the recovery program as well as once discharged into the community  Patient will complete WRAP Plan, Crisis Plan and 5 Life Domains  Patient will attend 50% of groups offered on the unit  Patient will complete a goal card weekly  Outcome: Not Progressing  Goal: Complete daily ADLs, including personal hygiene independently, as able  Description  Interventions:  - Observe, teach, and assist patient with ADLS  - Monitor and promote a balance of rest/activity, with adequate nutrition and elimination   Outcome: Not Progressing     Problem: Ineffective Coping  Goal: Identifies ineffective coping skills  Outcome: Not Progressing  Individual has been isolative to room majority of the shift  She was visible only for meals and medication pass  She did not participate in programming, no groups attended  She became dramatic in behaviors when physician redirected her to be wearing her mask  She began yelling out to writer that she was losing her balance  She was escorted to her room, and able to do so without difficulty  Appetite has been poor  Compliant with medications  Hygiene remains poor, last recorded shower was 4/3/2020  Will continue to monitor

## 2020-04-08 NOTE — PROGRESS NOTES
Agustin Edgar has been sleeping since the beginning of the shift  Respirations easy and non labored on humidified oxygen 1 liter via nasal cannula  No issues or behaviors  Compliant with medication  Continue to monitor  Precautions maintained

## 2020-04-08 NOTE — PROGRESS NOTES
04/08/20 0800   Team Meeting   Meeting Type Daily Rounds   Team Members Present   Team Members Present Physician;Nurse;; Other (Discipline and Name)   Physician Team Member Dr Donta Patton Team Member Tonny Santiago, RN   Care Management Team Member Patricio De Jesus MSW   Other (Discipline and Name) JER DonaldW; SHANIKA Toussaint     Patient ate 25% of each meal yesterday, is preoccupied once again with swallowing/choking  She reports she "choked" on a fragment of "chip" because "the TV was on "  Labs to be drawn 4/10  Slept

## 2020-04-08 NOTE — TREATMENT TEAM
04/08/20 1100   Activity/Group Checklist   Group   ( IMR/[employer] )   Attendance Did not attend   Attendance Duration (min) 46-60   Affect/Mood IRMA

## 2020-04-08 NOTE — PROGRESS NOTES
Psychiatry Progress Note [address] 60 y o  female MRN: [id number]  [facility] [place] [id number] Encounter: [id number]  Code Status: Level 1 - Full Code    PCP: Srinivasan Charles PA-C    Date of Admission:  7/23/2019 1730   Date of Service:  04/08/20  Patient Active Problem List   Diagnosis    COPD with asthma ([facility] )    Tobacco use disorder, continuous    Compression fracture of L4 lumbar vertebra    Ventral hernia    Acute on chronic respiratory failure with hypoxia ([facility] )    [facility], [facility]    Acquired hypothyroidism    Gastroesophageal reflux disease without esophagitis    Abnormal CT of the chest    Excessive cerumen in left ear canal    Lipoma of right upper extremity    Noncompliant with deep vein thrombosis (DVT) prophylaxis    At risk for aspiration    Ear ache     Diagnosis schizoaffective bipolar    Assessment   Overall Status: improving    Certification Statement: The patient will continue to require additional inpatient hospital stay to assess ability to maintain improvement by attending to ADLs and taking showers regular and see how she does on outing with family and the overnight pass at the personal care home once the virus restrictions are lifted   Acceptance by patient: accepting now  Ray Wick in recovery: living at the [address] [place] [address] Rd soon   Understanding of medications: yes     Involved in reintegration process: on hold due to [address] in relationship with psychiatrist: trusting now     Medication changes    None today    Non-pharmacological treatments   Continue with individual, group, milieu and occupational therapy using recovery principles and psycho-education about accepting illness and the need for treatment   Encourage to take showers twice a week and cooperate with treatment and adl skills as per her behav   Plan   Another therapeutic pass with sister now that she did well  with the assigned staff escort to the park but it is now on hold due to [address] virus   Prepare for the [address] [place] [address] Rd and encouraged to accept  rather than refuse it    Safety   Safety and communication plan established to target dynamic risk factors discussed above  Discharge Plan   · back to a [facility] at [address]   Patient is still preoccupied suspicious about the motives of the staff questioning them about medications continues to accuse them of tampering with the oxygen concentrator and her inhaler  She is still fearful that she might choke herself from food and may die from corona virus or heart attack and needs lot of reminders and support and reassurance to redirect her from such thoughts  Patient again complaints about a blocked left ear canal even though it was found to be clear when checked by medical last week and she used to use Debrox ear drops which she has been any  She has not taken showers in the last few days has been passive-aggressive laying on her bed  She is still afraid that she may die from choking on her food or from corona virus off from heart attack or from shortness of breath and needs lot of verbal support and reassurance on a daily basis  She is eating fairly and has not gained or lost much weight as she picks on her food off and on  She is sleeping fairly well    She does not admit to any overt delusional beliefs or hallucinations but she continues to appear to harbor some     Mental Status Exam  Appearance: age appropriate and older than stated age, wearing a mask,  better groomed with combed hair sitting on bed somewhat anxious appearing not very well groomed or kept as she has not taken showers in a few days now  Behavior: evasive and guarded superficially friendly pleasant anxious and suspicious at times   Speech: delayed, increased latency of response and tangential   Mood: anxious, euphoric and irritable,    Affect: increased in intensity, increased in range, mood-congruent and redirectable  Thought Process: circumstantial, concrete, goal directed, illogical and perserverative tendency to have stilted speech  Thought Content: delusions  grandiose and persecutory no overt delusions elicited but appears suspicious of staff tampering with her medications inhalant etc   Still psychosomatic about fear of death from corona virus heart attack etc needing frequent support and reassurance  Continues to believe that she has earwax in her left ear  No current suicidal homicidal thoughts intent or plans reported  No phobias obsessions or compulsions otherwise  Fredy Puente   Perceptual Disturbances: None   Risk Potential: Inability to care for herself and refusal to take showers regularly  Sensorium: person, place, time/date, situation, day of week, month of year, year and time  Cognition: grossly intact aware of current events like the corona virus, no deficit in language, no deficit in recent or remote memory  Consciousness: alert and awake   Attention: Fair  Intellect: Average  Insight: Limited  Judgment: fair   Motor Activity: no abnormal movements      Vitals  Temp:  [97 1 °F (36 2 °C)] 97 1 °F (36 2 °C)  HR:  [87] 87  Resp:  [16] 16  BP: (96)/(55) 96/55  SpO2:  [94 %] 94 %  No intake or output data in the 24 hours ending 04/08/20 0826    Lab Results: No New Labs Available For Today          Current Facility-Administered Medications:  acetaminophen 325 mg Oral Q6H PRN Shaggy Rangel MD   acetaminophen 650 mg Oral Q6H PRN Shaggy Rangel MD   acetaminophen 650 mg Oral Q8H PRN Shaggy Rangel MD   albuterol 2 puff Inhalation Q4H PRN Shaggy Rangel MD   aluminum-magnesium hydroxide-simethicone 15 mL Oral Q4H PRN Shaggy Rangel MD   ammonium lactate 1 application Topical BID PRN Shaggy Rangel MD   benzonatate 100 mg Oral TID PRN Shaggy Rangel MD   benztropine 1 mg Intramuscular Q8H PRN Shaggy Rangel MD   carbamide peroxide 5 drop Left Ear BID PRN Tracee Kay MD   cloZAPine 25 mg Oral BID Shaggy Rangel MD   cloZAPine 50 mg Oral BID Jeffrey Gallegos MD   docusate sodium 100 mg Oral BID PRN Jeffrey Gallegos MD   EPINEPHrine PF 0 15 mg Intramuscular Once PRN Jeffrey Gallegos MD   fluticasone-vilanterol 1 puff Inhalation Daily Jeffrey Gallegos MD   ketotifen 1 drop Right Eye BID PRN Jeffrey Gallegos MD   levothyroxine 125 mcg Oral Early Morning Jeffrey Gallegos MD   magnesium hydroxide 30 mL Oral Daily PRN Jeffrey Gallegos MD   montelukast 10 mg Oral HS Jeffrey Gallegos MD   OLANZapine 5 mg Intramuscular Q8H PRN Jeffrey Gallegos MD   OLANZapine 5 mg Oral Q8H PRN Jeffrey Gallegos MD   ondansetron 4 mg Oral Q6H PRN Jeffrey Gallegos MD   pantoprazole 40 mg Oral Early Morning Jeffrey Gallegos MD   polyethylene glycol 17 g Oral Daily PRN Jeffrey Gallegos MD   polyvinyl alcohol 1 drop Both Eyes Q3H PRN Jeffrey Gallegos MD   sertraline 175 mg Oral Daily Jeffrey Gallegos MD   sucralfate 1,000 mg Oral BID Demetrice Cabezas MD   theophylline 200 mg Oral Daily Jeffrey Gallegos MD   tiotropium 18 mcg Inhalation Daily Jeffrey Gallegos MD   traZODone 25 mg Oral HS PRN Jeffrey Gallegos MD       Counseling / Coordination of Care: Total floor / unit time spent today 15 minutes  Greater than 50% of total time was spent with the patient and / or family counseling and / or somewhat receptive to supportive listening and teaching positive coping skills to deal with symptom mangement  Patient's Rights, confidentiality and exceptions to confidentiality, use of automated medical record, [facility] staff access to medical record, and consent to treatment reviewed

## 2020-04-08 NOTE — PLAN OF CARE
Problem: Alteration in Thoughts and Perception  Goal: Verbalize thoughts and feelings  Description  Interventions:  - Promote a nonjudgmental and trusting relationship with the patient through active listening and therapeutic communication  - Assess patient's level of functioning, behavior and potential for risk  - Engage patient in 1 on 1 interactions for a minimum of 15 minutes each session  - Encourage patient to express fears, feelings, frustrations, and discuss symptoms    - [place] patient to reality, help patient recognize reality-based thinking   - Administer medications as ordered and assess for potential side effects  - Provide the patient education related to the signs and symptoms of the illness and desired effects of prescribed medications  Outcome: Progressing  Goal: Agree to be compliant with medication regime, as prescribed and report medication side effects  Description  Interventions:  - Offer appropriate PRN medication and supervise ingestion; conduct aims, as needed   Outcome: Progressing     Problem: Alteration in Thoughts and Perception  Goal: Attend and participate in unit activities, including therapeutic, recreational, and educational groups  Description  Interventions:  - Provide therapeutic and educational activities daily, encourage attendance and participation, and document same in the medical record     CERTIFIED PEER SPECIALIST INTERVENTIONS:    Complete peer assessment with patient to assess their needs and identify their goals to complete while in the recovery program as well as once discharged into the community  Patient will complete WRAP Plan, Crisis Plan and 5 Life Domains  Patient will attend 50% of groups offered on the unit  Patient will complete a goal card weekly      Outcome: Not Progressing  Goal: Recognize dysfunctional thoughts, communicate reality-based thoughts at the time of discharge  Description  Interventions:  - Provide medication and psycho-education to assist patient in compliance and developing insight into his/her illness   Outcome: Not Progressing  Goal: Complete daily ADLs, including personal hygiene independently, as able  Description  Interventions:  - Observe, teach, and assist patient with ADLS  - Monitor and promote a balance of rest/activity, with adequate nutrition and elimination   Outcome: Not Progressing     Problem: Depression  Goal: Refrain from isolation  Description  Interventions:  - Develop a trusting relationship   - Encourage socialization   Outcome: Not Progressing     [address] was out in [place] phone chair early in shift when phone not in use  Otherwise, is isolative to bed sleeping  She is some unsettled tonight in presentation  Ate poorly, 25% of meal, but, drank the Ensure  Did not attend PM Group  Came up for scheduled medicines except the Singulair & used the Debrox gtts 5 L ear @ 2135 for the clogged sensation  Did poorly w/the Incentive Spirometer tonight, achieving only 1000ml, only 5 of 10 breaths done  Had HS snack, but, scared herself when choked on a fragment of potato chip  "It happens when the TV is on " Meant this in a more sinister sense than merely being distracted by the TV  No motivation for hygiene tonight  Wearing now her QHS humidified nasal O2 @ 1L for bed

## 2020-04-09 NOTE — PROGRESS NOTES
04/09/20 1000   Team Meeting   Meeting Type Daily Rounds   Team Members Present   Team Members Present Physician;Nurse;; Other (Discipline and Name)   Physician Team Member Dr Helena Navarro Team Member Tony vargas RN   Care Management Team Member ASHLYN Barnes   Other (Discipline and Name) Yair Gonsalves LCSW; Jorje Nephew, CPS     Patient is reporting increased depression due to current events  She is disheveled with poor appetite

## 2020-04-09 NOTE — PLAN OF CARE
Problem: Alteration in Thoughts and Perception  Goal: Verbalize thoughts and feelings  Description  Interventions:  - Promote a nonjudgmental and trusting relationship with the patient through active listening and therapeutic communication  - Assess patient's level of functioning, behavior and potential for risk  - Engage patient in 1 on 1 interactions for a minimum of 15 minutes each session  - Encourage patient to express fears, feelings, frustrations, and discuss symptoms    - [place] patient to reality, help patient recognize reality-based thinking   - Administer medications as ordered and assess for potential side effects  - Provide the patient education related to the signs and symptoms of the illness and desired effects of prescribed medications  Outcome: Progressing  Goal: Agree to be compliant with medication regime, as prescribed and report medication side effects  Description  Interventions:  - Offer appropriate PRN medication and supervise ingestion; conduct aims, as needed   Outcome: Progressing     Problem: Alteration in Thoughts and Perception  Goal: Attend and participate in unit activities, including therapeutic, recreational, and educational groups  Description  Interventions:  - Provide therapeutic and educational activities daily, encourage attendance and participation, and document same in the medical record     CERTIFIED PEER SPECIALIST INTERVENTIONS:    Complete peer assessment with patient to assess their needs and identify their goals to complete while in the recovery program as well as once discharged into the community  Patient will complete WRAP Plan, Crisis Plan and 5 Life Domains  Patient will attend 50% of groups offered on the unit  Patient will complete a goal card weekly      Outcome: Not Progressing  Goal: Complete daily ADLs, including personal hygiene independently, as able  Description  Interventions:  - Observe, teach, and assist patient with ADLS  - Monitor and promote a balance of rest/activity, with adequate nutrition and elimination   Outcome: Not Progressing     Problem: Depression  Goal: Refrain from isolation  Description  Interventions:  - Develop a trusting relationship   - Encourage socialization   Outcome: Not Progressing     [address] has been isolative to her room & bed throughout shift, coming out only for scheduled medicines (except Singulair), meal (refused as "not hungry", but, drank Ensure), for HS snack  Did not address hygiene  Did not attend PM Group  Describes herself as "depressed" since Treatment Team as does not feel the [place] is an appropriate discharge setting for her  Believes could scrounge up enough funds to pay for 4 mos @ [facility]  Has no answer when asked 'Then what?' by this nurse  Dislikes current fill in , finds her "blunt", abrasive  In a quandary over what to do  Says if she were sent to [facility] it would embarrass & disgrace her [place] son  Was encouraged not to let the housing issue flatten her into immobility as it has the past two days  She was encouraged to move around, drink fluids  She did perform her Incentive Spirometry & did exemplary job achieving consistent 1250ml volumes  Wearing now her QHS humidified nasal O2 @ 1L for bed  Debrox gtts 5 to L ear @ 2126 @ her request for continues clogged sensation

## 2020-04-09 NOTE — PROGRESS NOTES
Maggie maintained on ongoing fall and SAFE precaution  [place] in bed with eyes closed, breath even and unlabored   On O2 with humidifier @1L/m via nasal cannula  Continues rounding implemented   No somatic complaint overnight  No PRN needed for sleep aid   No indication of pain or discomfort   No respiratory distress   Will continue to monitor

## 2020-04-09 NOTE — PLAN OF CARE
Problem: Alteration in Thoughts and Perception  Goal: Complete daily ADLs, including personal hygiene independently, as able  Description  Interventions:  - Observe, teach, and assist patient with ADLS  - Monitor and promote a balance of rest/activity, with adequate nutrition and elimination   Outcome: Not Progressing     Problem: Ineffective Coping  Goal: Identifies healthy coping skills  Outcome: Not Progressing  Goal: Participates in unit activities  Description  Interventions:  - Provide therapeutic environment   - Provide required programming   - Redirect inappropriate behaviors   Outcome: Not Progressing  Individual has been in room, isolative all shift, visible only for meals  She did not participate in programming, no groups were attended  She did not verbalize any issues or concerns  She continues with a poor appetite  Availability of staff made known  Will continue to monitor

## 2020-04-09 NOTE — PROGRESS NOTES
Psychiatry Progress Note [address] 60 y o  female MRN: [id number]  [facility] [place] [address] Encounter: [id number]  Code Status: Level 1 - Full Code    PCP: Amy Cameron PA-C    Date of Admission:  7/23/2019 1730   Date of Service:  04/09/20  Patient Active Problem List   Diagnosis    COPD with asthma ([facility] )    Tobacco use disorder, continuous    Compression fracture of L4 lumbar vertebra    Ventral hernia    Acute on chronic respiratory failure with hypoxia ([facility] )    Schizoaffective disorder, bipolar type ([facility] )    Acquired hypothyroidism    Gastroesophageal reflux disease without esophagitis    Abnormal CT of the chest    Excessive cerumen in left ear canal    Lipoma of right upper extremity    Noncompliant with deep vein thrombosis (DVT) prophylaxis    At risk for aspiration    Ear ache     Diagnosis schizoaffective bipolar    Assessment   Overall Status: improving    Certification Statement: The patient will continue to require additional inpatient hospital stay to assess ability to maintain improvement by attending to ADLs and taking showers regular and see how she does on outing with family and the overnight pass at the personal care home once the virus restrictions are lifted   Acceptance by patient: accepting now  E[place] Elizabeth in recovery: living at the [address] [place] [address] Rd soon   Understanding of medications: yes     Involved in reintegration process: on hold due to [address] in relationship with psychiatrist: trusting now     Medication changes    None today    Non-pharmacological treatments   Continue with individual, group, milieu and occupational therapy using recovery principles and psycho-education about accepting illness and the need for treatment   Encourage to take showers twice a week and cooperate with treatment and adl skills as per her behav   Plan   Another therapeutic pass with sister now that she did well  with the assigned staff escort to the park but it is now on hold due to [address] virus   Prepare for the [facility] and encouraged to accept  rather than refuse it    Safety   Safety and communication plan established to target dynamic risk factors discussed above  Discharge Plan   · back to a [facility] at [address]   Patient is still over dramatic in her interaction and presentation claiming she cannot breathe and is passive-aggressive in attending groups and her group attendance has diminished lately  She is now debating if she should go to the [facility] personal care home with a already accepted her but does not wish to go to [facility] and things she can go into a private personal care home with the [facility] but she cannot afford it for more than 4 months at the most   He she is still suspicious as to the motives of the staff and examines her medications physically before taking them and still suspect some of the staff maybe tampering with her medications as well as her inhaler and the oxygen concentrator  She claims to feel sad and depressed but does not appear to be depressed clinically and refuses to increase the Zoloft when suggested  She does not admit to any overt delusional believes but her actions and demeanor indicate that she must be harboring some underlying paranoia  She is compliant with medications and is eating sporadically but has not lost weight and sleeping well  She is preferring to stay back on her bed not always taking showers despite reminding her to take showers twice a week as per her behavior  Review of systems unremarkable except for her psychosomatic complaints about fear of dying from corona virus, from heart attack, from shortness of breath and from choking on food etc   Still preoccupied with left ear being blocked despite using eardrops and continues to need lot of reassurance and verbal support    She has not taken a shower since the 3rd and tells me she will try to take 1 today and remind    Mental Status Exam  Appearance: age appropriate and older than stated age, wearing a mask,  poorly groomed with uncombed long hair sitting in the dining arrieta, somewhat anxious appearing not very well groomed or kept as she has not taken showers in a few days now  Behavior: evasive and guarded superficially friendly pleasant anxious and suspicious at times   Speech: delayed, increased latency of response and tangential   Mood: anxious, euphoric and irritable,    Affect: increased in intensity, increased in range, mood-congruent and redirectable  Thought Process: circumstantial, concrete, goal directed, illogical and perserverative tendency to have stilted speech  Thought Content: delusions  grandiose and persecutory no overt delusions elicited but appears suspicious of staff tampering with her medications inhalant etc   Still psychosomatic about fear of death from corona virus heart attack etc needing frequent support and reassurance  Continues to believe that she has earwax in her left ear  No current suicidal homicidal thoughts intent or plans reported  No phobias obsessions or compulsions otherwise  Monia Le   Perceptual Disturbances: None   Risk Potential: Inability to care for herself and refusal to take showers regularly  Sensorium: person, place, time/date, situation, day of week, month of year, year and time  Cognition: grossly intact aware of current events like the corona virus, no deficit in language, no deficit in recent or remote memory  Consciousness: alert and awake   Attention: Fair  Intellect: Average  Insight: Limited  Judgment: fair   Motor Activity: no abnormal movements      Vitals  Temp:  [97 8 °F (36 6 °C)] 97 8 °F (36 6 °C)  HR:  [100] 100  Resp:  [16] 16  BP: (80)/(64) 80/64  SpO2:  [93 %] 93 %  No intake or output data in the 24 hours ending 04/09/20 0803    Lab Results: No New Labs Available For Today          Current Facility-Administered Medications:  acetaminophen 325 mg Oral Q6H PRN Zander Yanez MD   acetaminophen 650 mg Oral Q6H PRN Zander Yanez MD   acetaminophen 650 mg Oral Q8H PRN Zander Yanez MD   albuterol 2 puff Inhalation Q4H PRN Zander Yanez MD   aluminum-magnesium hydroxide-simethicone 15 mL Oral Q4H PRN Zander Yanez MD   ammonium lactate 1 application Topical BID PRN Zander Yanez MD   benzonatate 100 mg Oral TID PRN Zander Yanez MD   benztropine 1 mg Intramuscular Q8H PRN Zander Yanez MD   carbamide peroxide 5 drop Left Ear BID PRN Therese Brooke MD   cloZAPine 25 mg Oral BID Zander Yanez MD   cloZAPine 50 mg Oral BID Zander Yanez MD   docusate sodium 100 mg Oral BID PRN Zander Yanez MD   EPINEPHrine PF 0 15 mg Intramuscular Once PRN Zander Yanez MD   fluticasone-vilanterol 1 puff Inhalation Daily Zander Yanez MD   ketotifen 1 drop Right Eye BID PRN Zander Yanez MD   levothyroxine 125 mcg Oral Early Morning Zander Yanez MD   magnesium hydroxide 30 mL Oral Daily PRN Zander Yanez MD   montelukast 10 mg Oral HS Zander Yanez MD   OLANZapine 5 mg Intramuscular Q8H PRN Zander Yanez MD   OLANZapine 5 mg Oral Q8H PRN Zander Yanez MD   ondansetron 4 mg Oral Q6H PRN Zander Yanez MD   pantoprazole 40 mg Oral Early Morning Zander Yanez MD   polyethylene glycol 17 g Oral Daily PRN Zander Yanez MD   polyvinyl alcohol 1 drop Both Eyes Q3H PRN Zander Yanez MD   sertraline 175 mg Oral Daily Zander Yanez MD   sucralfate 1,000 mg Oral BID Tomás Layne MD   theophylline 200 mg Oral Daily Zander Yanez MD   tiotropium 18 mcg Inhalation Daily Zander Yanez MD   traZODone 25 mg Oral HS PRN Zander Yanez MD       Counseling / Coordination of Care: Total floor / unit time spent today 15 minutes  Greater than 50% of total time was spent with the patient and / or family counseling and / or somewhat receptive to supportive listening and teaching positive coping skills to deal with symptom mangement       Patient's Rights, confidentiality and exceptions to confidentiality, use of [employer] medical record, [employer] staff access to medical record, and consent to treatment reviewed

## 2020-04-09 NOTE — PLAN OF CARE
Problem: Alteration in Thoughts and Perception  Goal: Verbalize thoughts and feelings  Description  Interventions:  - Promote a nonjudgmental and trusting relationship with the patient through active listening and therapeutic communication  - Assess patient's level of functioning, behavior and potential for risk  - Engage patient in 1 on 1 interactions for a minimum of 15 minutes each session  - Encourage patient to express fears, feelings, frustrations, and discuss symptoms    - Fisher patient to reality, help patient recognize reality-based thinking   - Administer medications as ordered and assess for potential side effects  - Provide the patient education related to the signs and symptoms of the illness and desired effects of prescribed medications  Outcome: Progressing  Goal: Attend and participate in unit activities, including therapeutic, recreational, and educational groups  Description  Interventions:  - Provide therapeutic and educational activities daily, encourage attendance and participation, and document same in the medical record     CERTIFIED PEER SPECIALIST INTERVENTIONS:    Complete peer assessment with patient to assess their needs and identify their goals to complete while in the recovery program as well as once discharged into the community  Patient will complete WRAP Plan, Crisis Plan and 5 Life Domains  Patient will attend 50% of groups offered on the unit  Patient will complete a goal card weekly      Outcome: Progressing     Problem: Alteration in Orientation  Goal: Interact with staff daily  Description  Interventions:  - Assess and re-assess patient's level of orientation  - Engage patient in 1 on 1 interactions, daily, for a minimum of 15 minutes   - Establish rapport/trust with patient   Outcome: Progressing     Problem: Alteration in Thoughts and Perception  Goal: Complete daily ADLs, including personal hygiene independently, as able  Description  Interventions:  - Observe, teach, and assist patient with ADLS  - Monitor and promote a balance of rest/activity, with adequate nutrition and elimination   Outcome: Not Progressing     Problem: Depression  Goal: Refrain from isolation  Description  Interventions:  - Develop a trusting relationship   - Encourage socialization   Outcome: Not Progressing  Goal: Refrain from self-neglect  Outcome: Not Ileana Abt has been in her room a majority of the shift  At times she will sit in the chair by the phone, even though not using it  Ambulated to the dining room without an issue  No complaint of being weak or dizzy  Took medication without difficulty  Ate 50% of her meal and drank 100% of Ensure  No shower, but did have a BM tonight  Did not go out for fresh air  Attend Women's group until she received a phone call  Did not attend evening group  Took HS medication and ate snack  Oxygen saturation 93% before oxygen (humidified) applied at 1 liter via nasal cannula  Continue to monitor  Precautions maintained

## 2020-04-09 NOTE — TREATMENT TEAM
Patient declined      04/09/20 0900   Activity/Group Checklist   Group Exercise  (Morning Stretch )   Attendance Did not attend   Attendance Duration (min) 16-30   Affect/Mood IRMA

## 2020-04-09 NOTE — TREATMENT TEAM
Patient declined      04/09/20 1100   Activity/Group Checklist   Group   (IMR/Demonstrating Coping Skills )   Attendance Did not attend   Attendance Duration (min) Greater than 60   Affect/Mood IRMA

## 2020-04-10 NOTE — PROGRESS NOTES
Psychiatry Progress Note [address] 60 y o  female MRN: [id number]  [facility] [address] Encounter: [id number]  Code Status: Level 1 - Full Code    PCP: Sheron Rangel PA-C    Date of Admission:  7/23/2019 1737   Date of Service:  04/10/20  Patient Active Problem List   Diagnosis    COPD with asthma ([facility] )    Tobacco use disorder, continuous    Compression fracture of L4 lumbar vertebra    Ventral hernia    Acute on chronic respiratory failure with hypoxia ([facility] )    Schizoaffective disorder, bipolar type ([facility] )    Acquired hypothyroidism    Gastroesophageal reflux disease without esophagitis    Abnormal CT of the chest    Excessive cerumen in left ear canal    Lipoma of right upper extremity    Noncompliant with deep vein thrombosis (DVT) prophylaxis    At risk for aspiration    Ear ache     Diagnosis schizoaffective bipolar    Assessment   Overall Status: improving    Certification Statement: The patient will continue to require additional inpatient hospital stay to assess ability to maintain improvement by attending to ADLs and taking showers regular and see how she does on outing with family and the overnight pass at the personal care home once the virus restrictions are lifted   Acceptance by patient: accepting now  Viv Pena in recovery: living at the [address] [place] [address] Rd soon   Understanding of medications: yes     Involved in reintegration process: on hold due to [address] in relationship with psychiatrist: trusting now     Medication changes    None today    Non-pharmacological treatments   Continue with individual, group, milieu and occupational therapy using recovery principles and psycho-education about accepting illness and the need for treatment   Encourage to take showers twice a week and cooperate with treatment and adl skills as per her behav   Plan   Another therapeutic pass with sister now that she did well  with the assigned staff escort to the park but it is now on hold due to [address] virus   Prepare for the [address] [place] [address] Rd and encouraged to accept  rather than refuse it    Safety   Safety and communication plan established to target dynamic risk factors discussed above  Discharge Plan   · back to a [facility] at [address]   Patient states that she did talk to the person in charge of the [facility] and she is expecting a call back from them  However I reminded her that she may not be able to afford that place as she will soon run out of the money she has in her account and she won be able to last the too long and she is going to discuss this further with the   She remains passive-aggressive in attending groups and her group attendance has diminished lately  She is now debating if she should go to the [facility] personal care home with a already accepted her but does not wish to go to [facility]   She is still suspicious as to the motives of the staff and examines her medications physically before taking them and still suspects some of the staff maybe tampering with her medications as well as her inhaler and the oxygen concentrator  She claims to feel sad and depressed but does not appear to be depressed clinically but refusing to make any changes in her medications when offered     She does not admit to any overt delusional believes but her actions and demeanor indicate that she must be harboring some underlying paranoia  She is compliant with medications and is eating sporadically but has not lost weight and sleeping well  She is preferring to stay back on her bed not always taking showers despite reminding her to take showers twice a week as per her behavior  She continues to need lot of reassurance and verbal support because of her underlying psychosomatic    She has not taken a shower since the 3rd and tells me she will try to take 1 today and remind  Sleep:  Fair  Appetite: Fair  Side effects:  None  Review of systems: Review of systems unremarkable except for her psychosomatic complaints about fear of dying from corona virus, from heart attack, from shortness of breath and from choking on food etc   Still preoccupied with left ear being blocked despite using eardrops     Mental Status Exam  Appearance: age appropriate and older than stated age, wearing a mask,  poorly groomed with uncombed long hair sitting in the dining arrieta, somewhat anxious appearing not very well groomed or kept as she has not taken showers in a few days now  Behavior: evasive and guarded superficially friendly pleasant anxious and suspicious at times   Speech: delayed, increased latency of response and tangential   Mood: anxious, euphoric and irritable,    Affect: increased in intensity, increased in range, mood-congruent and redirectable  Thought Process: circumstantial, concrete, goal directed, illogical and perserverative tendency to have stilted speech  Thought Content: delusions  grandiose and persecutory no overt delusions elicited but appears suspicious of staff tampering with her medications inhalant etc   Still psychosomatic about fear of death from corona virus heart attack etc needing frequent support and reassurance  Continues to believe that she has earwax in her left ear  No current suicidal homicidal thoughts intent or plans reported  No phobias obsessions or compulsions otherwise  Mora West   Perceptual Disturbances: None   Risk Potential: Inability to care for herself and refusal to take showers regularly  Sensorium: person, place, time/date, situation, day of week, month of year, year and time  Cognition: grossly intact aware of current events like the corona virus, no deficit in language, no deficit in recent or remote memory  Consciousness: alert and awake   Attention: Fair  Intellect: Average  Insight: Limited  Judgment: fair   Motor Activity: no abnormal movements      Vitals  Temp:  [96 7 °F (35 9 °C)-97 3 °F (36 3 °C)] 97 °F (36 1 °C)  HR:  [73-91] 78  Resp:  [15-18] 15  BP: ()/(60-67) 96/60  SpO2:  [90 %-93 %] 93 %  No intake or output data in the 24 hours ending 04/10/20 0824    Lab Results: No New Labs Available For Today          Current Facility-Administered Medications:  acetaminophen 325 mg Oral Q6H PRN Krystyna Mcclain, MD   acetaminophen 650 mg Oral Q6H PRN Krystyna Mcclain, MD   acetaminophen 650 mg Oral Q8H PRN Krystyna Mcclain, MD   albuterol 2 puff Inhalation Q4H PRN Krystyna Mcclain, MD   aluminum-magnesium hydroxide-simethicone 15 mL Oral Q4H PRN Krystyna Mcclain, MD   ammonium lactate 1 application Topical BID PRN Krystyna Mcclain, MD   benzonatate 100 mg Oral TID PRN Krystyna Mcclain, MD   benztropine 1 mg Intramuscular Q8H PRN Krystyna Mcclain, MD   carbamide peroxide 5 drop Left Ear BID PRN Radha Stephenson, MD   cloZAPine 25 mg Oral BID Krystnya Mcclain, MD   cloZAPine 50 mg Oral BID Krystyna Mcclain, MD   docusate sodium 100 mg Oral BID PRN Krystyna Mcclain, MD   EPINEPHrine PF 0 15 mg Intramuscular Once PRN Krystyna Mcclain, MD   fluticasone-vilanterol 1 puff Inhalation Daily Krystyna Mcclain MD   ketotifen 1 drop Right Eye BID PRN Krystyna Mcclain, MD   levothyroxine 125 mcg Oral Early Morning Krystyna Mcclain MD   magnesium hydroxide 30 mL Oral Daily PRN Krystyna Mcclain, MD   montelukast 10 mg Oral HS Krystyna Mcclain, MD   OLANZapine 5 mg Intramuscular Q8H PRN Krystyna Mcclain, MD   OLANZapine 5 mg Oral Q8H PRN Krystyna Mcclain, MD   ondansetron 4 mg Oral Q6H PRN Krystyna Mcclain, MD   pantoprazole 40 mg Oral Early Morning Krystyna Mcclain, MD   polyethylene glycol 17 g Oral Daily PRN Krystyna Mcclain, MD   polyvinyl alcohol 1 drop Both Eyes Q3H PRN Krystyna Mcclain, MD   sertraline 175 mg Oral Daily Krystynapepito Mcclain MD   sucralfate 1,000 mg Oral BID Margaretann Seip, MD   theophylline 200 mg Oral Daily Krystyna Mcclain MD   tiotropium 18 mcg Inhalation Daily Krystyna Mcclain MD   traZODone 25 mg Oral HS PRN Krystyna Mcclain MD       Counseling / Coordination of Care:  Total floor / unit time spent today 15 minutes  Greater than 50% of total time was spent with the patient and / or family counseling and / or somewhat receptive to supportive listening and teaching positive coping skills to deal with symptom mangement  Patient's Rights, confidentiality and exceptions to confidentiality, use of automated medical record, Jeb Titus [employer]eline staff access to medical record, and consent to treatment reviewed

## 2020-04-10 NOTE — PROGRESS NOTES
04/10/20 1100   Activity/Group Checklist   Group Other (Comment)  (IMR - The Mind: Explained)   Attendance Did not attend     Patient was personally prompted and encouraged to attend IMR this morning, but declined, staying in bed  Patient was given reminder that once EAC outings are permitted again, her ability to go out will depend on her group percentage/participation in recovery on the unit

## 2020-04-10 NOTE — PROGRESS NOTES
04/10/20 0900   Activity/Group Checklist   Group Community meeting  (Meditation)   Attendance Did not attend     Patient was personally prompted to attend [employer] focused on morning meditation  Patient declined, preferring to stay in bed instead

## 2020-04-10 NOTE — PROGRESS NOTES
~Maggie maintained on ongoing fall and SAFE precaution   Laying in bed with eyes closed, breath even and unlabored   On O2 with humidifier @1L/m via nasal cannula  Continues rounding implemented   No somatic complaint overnight  No PRN needed for sleep aid   No indication of pain or discomfort   No respiratory distress   Will continue to monitor  ~Maggie has a scheduled biweekly CBCw/Diff to be obtain to monitor clozaril level

## 2020-04-10 NOTE — CASE MANAGEMENT
Patient insisting that she not return to [facility] because she will die there  CM explained that there is not many options right now and she will be well there  Patient reports that she will be going to [facility], cm reminded patient that she was already denied from there due to her behavioral issues as well as lack of funds  Patient reports she spoke the "head saleem" and he will accept her  Patient was informed that the "head saleem" is not a man and they received no phone calls as CM looked into this  CM will continue to follow and provide services as needed

## 2020-04-10 NOTE — PROGRESS NOTES
Attempted x 1 to obtain lab CBC w/Diff for clozaril monitoring this morning  Noted blood very thick, stop half of the way, and did not obtain  Encourage to drink more water and stay hydrated for the next shift

## 2020-04-10 NOTE — PROGRESS NOTES
04/10/20 0900   Team Meeting   Meeting Type Daily Rounds   Team Members Present   Team Members Present Physician;Nurse;; Other (Discipline and Name)   Physician Team Member Dr Jeannette Jiang Team Member Arnaud Gutierrez RN   Care Management Team Member ASHLYN Allen   Other (Discipline and Name) Porfirio Javier LCSW     Patient attended Women's group, but did not participate  Needs CBC draw

## 2020-04-10 NOTE — PLAN OF CARE
Problem: Alteration in Thoughts and Perception  Goal: Complete daily ADLs, including personal hygiene independently, as able  Description  Interventions:  - Observe, teach, and assist patient with ADLS  - Monitor and promote a balance of rest/activity, with adequate nutrition and elimination   Outcome: Not Progressing     Problem: Ineffective Coping  Goal: Participates in unit activities  Description  Interventions:  - Provide therapeutic environment   - Provide required programming   - Redirect inappropriate behaviors   Outcome: Not Progressing     Problem: Ineffective Coping  Goal: Participates in unit activities  Description  Interventions:  - Provide therapeutic environment   - Provide required programming   - Redirect inappropriate behaviors   Outcome: Not Progressing    Individual has been keeping to self, isolative to room, minimal interactions with others  She did not participate in programming, no groups were attended  She did not verbalize any somatic complaints  No overt signs of anxiety  Appetite remains poor, consuming 25% of both breakfast and lunch  CBC was not obtained, reordered for tomorrow AM   She became very dramatic, pulling away from the MHT, not allowing the MHT to obtain  Availability of staff made known  Will continue to monitor

## 2020-04-11 NOTE — PROGRESS NOTES
[address] did come out of room for HS medicine, had an HS snack  Wearing now her QHS humidified nasal O2 @ 1L for bed

## 2020-04-11 NOTE — PLAN OF CARE
Problem: Alteration in Thoughts and Perception  Goal: Verbalize thoughts and feelings  Description  Interventions:  - Promote a nonjudgmental and trusting relationship with the patient through active listening and therapeutic communication  - Assess patient's level of functioning, behavior and potential for risk  - Engage patient in 1 on 1 interactions for a minimum of 15 minutes each session  - Encourage patient to express fears, feelings, frustrations, and discuss symptoms    - Jackpot patient to reality, help patient recognize reality-based thinking   - Administer medications as ordered and assess for potential side effects  - Provide the patient education related to the signs and symptoms of the illness and desired effects of prescribed medications  Outcome: Progressing  Goal: Agree to be compliant with medication regime, as prescribed and report medication side effects  Description  Interventions:  - Offer appropriate PRN medication and supervise ingestion; conduct aims, as needed   4/10/2020 2006 by Chip Beaver RN  Outcome: Progressing  4/10/2020 2001 by Chip Beaver RN  Outcome: Progressing  Goal: Complete daily ADLs, including personal hygiene independently, as able  Description  Interventions:  - Observe, teach, and assist patient with ADLS  - Monitor and promote a balance of rest/activity, with adequate nutrition and elimination   4/10/2020 2006 by Chip Beaver RN  Outcome: Progressing  4/10/2020 2001 by Chip Beaver RN  Outcome: Progressing     Problem: Alteration in Thoughts and Perception  Goal: Attend and participate in unit activities, including therapeutic, recreational, and educational groups  Description  Interventions:  - Provide therapeutic and educational activities daily, encourage attendance and participation, and document same in the medical record     CERTIFIED PEER SPECIALIST INTERVENTIONS:    Complete peer assessment with patient to assess their needs and identify their goals to complete while in the recovery program as well as once discharged into the community  Patient will complete WRAP Plan, Crisis Plan and 5 Life Domains  Patient will attend 50% of groups offered on the unit  Patient will complete a goal card weekly  4/10/2020 2006 by Khloe Gonzales RN  Outcome: Not Progressing  4/10/2020 2001 by Khloe Gonzales RN  Outcome: Not Progressing     Problem: Depression  Goal: Refrain from isolation  Description  Interventions:  - Develop a trusting relationship   - Encourage socialization   4/10/2020 2006 by Khloe Gonzales RN  Outcome: Not Progressing  4/10/2020 2001 by Khloe Gonzales RN  Outcome: Not Progressing     Problem: Nutrition/Hydration-ADULT  Goal: Nutrient/Hydration intake appropriate for improving, restoring or maintaining nutritional needs  Description  Monitor and assess patient's nutrition/hydration status for malnutrition  Collaborate with interdisciplinary team and initiate plan and interventions as ordered  Monitor patient's weight and dietary intake as ordered or per policy  Utilize nutrition screening tool and intervene as necessary  Determine patient's food preferences and provide high-protein, high-caloric foods as appropriate       INTERVENTIONS:  - Monitor oral intake, urinary output, labs, and treatment plans  - Assess nutrition and hydration status and recommend course of action  - Evaluate amount of meals eaten  - Assist patient with eating if necessary   - Allow adequate time for meals  - Recommend/ encourage appropriate diets, oral nutritional supplements, and vitamin/mineral supplements  - Order, calculate, and assess calorie counts as needed  - Recommend, monitor, and adjust tube feedings and TPN/PPN based on assessed needs  - Assess need for intravenous fluids  - Provide specific nutrition/hydration education as appropriate  - Include patient/family/caregiver in decisions related to nutrition  Outcome: Not Progressing [address] Rito Baez was motivated to shower & groom w/setup by MHT & assistance w/hair  She came out readily for supper medicine  But, intake poor @ meal, 25%, though, did have an Ensure  She feels limited by the dietary choices as views most offerings as inedible, is sick of egg salad, plus has found it to be "rubbery", stale last few days  Is isolative to her room & bed in free time  Was encouraged to drink more to build up her fluid volume  & blood pressure so labs can be obtained tomorrow  Did not respond to invitation to PM Group

## 2020-04-11 NOTE — PLAN OF CARE
Problem: Alteration in Thoughts and Perception  Goal: Agree to be compliant with medication regime, as prescribed and report medication side effects  Description  Interventions:  - Offer appropriate PRN medication and supervise ingestion; conduct aims, as needed   Outcome: Progressing  Goal: Attend and participate in unit activities, including therapeutic, recreational, and educational groups  Description  Interventions:  - Provide therapeutic and educational activities daily, encourage attendance and participation, and document same in the medical record     CERTIFIED PEER SPECIALIST INTERVENTIONS:    Complete peer assessment with patient to assess their needs and identify their goals to complete while in the recovery program as well as once discharged into the community  Patient will complete WRAP Plan, Crisis Plan and 5 Life Domains  Patient will attend 50% of groups offered on the unit  Patient will complete a goal card weekly  Outcome: Not Progressing    Sania Burn has been isolative to her room for the majority of the shift, no groups attended  Only coming out for medications and meals  Appetite remains poor, eating 25% of breakfast and 50% lunch  No somatic complaints verbalized to this writer other than fatigue, disheveled in appearance, did not tend to her hygiene, isolating herself in her room in between meals  Otherwise, behaviors remain controlled  Will continue to monitor

## 2020-04-11 NOTE — PROGRESS NOTES
Psychiatry Progress Note [address] 60 y o  female MRN: [id number]  Unit/Bed#: [facility] 342-11 Encounter: [id number]  Code Status: Level 1 - Full Code    PCP: Deeanna Apley, PA-C    Date of Admission:  7/23/2019 1730   Date of Service:  04/11/20  Patient Active Problem List   Diagnosis    COPD with asthma ([facility] )    Tobacco use disorder, continuous    Compression fracture of L4 lumbar vertebra    Ventral hernia    Acute on chronic respiratory failure with hypoxia ([facility] )    Schizoaffective disorder, bipolar type ([facility] )    Acquired hypothyroidism    Gastroesophageal reflux disease without esophagitis    Abnormal CT of the chest    Excessive cerumen in left ear canal    Lipoma of right upper extremity    Noncompliant with deep vein thrombosis (DVT) prophylaxis    At risk for aspiration    Ear ache     Diagnosis schizoaffective bipolar    Assessment   Overall Status: improving    Certification Statement: The patient will continue to require additional inpatient hospital stay to assess ability to maintain improvement by attending to ADLs and taking showers regular and see how she does on outing with family and the overnight pass at the personal care home once the virus restrictions are lifted   Acceptance by patient: accepting now  Alberteen December in recovery: living at the [address] [place] [address] Rd soon   Understanding of medications: yes     Involved in reintegration process: on hold due to [address] in relationship with psychiatrist: trusting now     Medication changes    None today    Non-pharmacological treatments   Continue with individual, group, milieu and occupational therapy using recovery principles and psycho-education about accepting illness and the need for treatment   Encourage to take showers twice a week and cooperate with treatment and adl skills as per her behav   Plan   Another therapeutic pass with sister now that she did well  with the assigned staff escort to the park but it is now on hold due to [address] virus   Prepare for the [address] [place] [address] Rd and encouraged to accept  rather than refuse it    Safety   Safety and communication plan established to target dynamic risk factors discussed above  Discharge Plan   · back to a [facility] at [address]   Patient did finally take a shower yesterday on her own without assistance from staff and is proud to announce to me that  She is well groomed well kept wearing clean clothes and has combed her hair  She was found eating her breakfast this morning in the dining arrieta  She continues to insist that she can go to the above and beyond personal care home despite reminding her that she cannot afford it  She remains passive-aggressive in attending groups and her group attendance has diminished lately  She is now debating if she should go to the [facility] personal care home with a already accepted her but does not wish to go to [facility]   She is still suspicious as to the motives of the staff and examines her medications physically before taking them and still suspects some of the staff maybe tampering with her medications as well as her inhaler and the oxygen concentrator  She claims to feel sad and depressed but does not appear to be depressed clinically but refusing to make any changes in her medications when offered     No overt delusional believes the reported or admitted even though she does come across as somewhat suspicious  She is compliant with medications and is eating sporadically  She is preferring to stay back on her bed not always taking showers despite reminding her to take showers twice a week as per her behavior  She continues to need lot of reassurance and verbal support because of her underlying psychosomatic    Sleep:  Fair  Appetite:  Fair  Side effects:  None  Review of systems: Review of systems unremarkable except for her psychosomatic complaints about fear of dying from corona virus, from heart attack, from shortness of breath and from choking on food etc   Did not talk about her left ear blockage today    Mental Status Exam  Appearance: age appropriate and older than stated age, wearing a mask,  poorly groomed with uncombed long hair sitting in the dining arrieta, somewhat anxious appearing not very well groomed or kept as she has not taken showers in a few days now  Behavior: evasive and guarded superficially friendly pleasant anxious and suspicious at times   Speech: delayed, increased latency of response and tangential   Mood: anxious, euphoric and irritable,    Affect: increased in intensity, increased in range, mood-congruent and redirectable  Thought Process: circumstantial, concrete, goal directed, illogical and perserverative tendency to have stilted speech  Thought Content: delusions  grandiose and persecutory no overt delusions elicited but appears suspicious of staff tampering with her medications inhalant etc   Still psychosomatic about fear of death from corona virus heart attack etc needing frequent support and reassurance  Continues to believe that she has earwax in her left ear  No current suicidal homicidal thoughts intent or plans reported  No phobias obsessions or compulsions otherwise  Frank Marks   Perceptual Disturbances: None   Risk Potential: Inability to care for herself and refusal to take showers regularly  Sensorium: person, place, time/date, situation, day of week, month of year, year and time  Cognition: grossly intact aware of current events like the corona virus, no deficit in language, no deficit in recent or remote memory  Consciousness: alert and awake   Attention: Fair  Intellect: Average  Insight: Limited  Judgment: fair   Motor Activity: no abnormal movements      Vitals  Temp:  [97 3 °F (36 3 °C)-97 6 °F (36 4 °C)] 97 6 °F (36 4 °C)  HR:  [88-93] 88  Resp:  [16-18] 18  BP: ()/(66-69) 118/69  SpO2:  [94 %-95 %] 95 %  No intake or output data in the 24 hours ending 04/11/20 1109    Lab Results: No New Labs Available For Today  Results from last 7 days   Lab Units 04/11/20  0630   WBC Thousand/uL 8 20   RBC Million/uL 5 08   HEMOGLOBIN g/dL 15 8   HEMATOCRIT % 47 7*   MCV fL 94   PLATELETS Thousands/uL 201   NEUTROS ABS Thousands/µL 4 30         Current Facility-Administered Medications:  acetaminophen 325 mg Oral Q6H PRN Sindy Day MD   acetaminophen 650 mg Oral Q6H PRN Sindy Day MD   acetaminophen 650 mg Oral Q8H PRN Sindy Day MD   albuterol 2 puff Inhalation Q4H PRN Sindy Day MD   aluminum-magnesium hydroxide-simethicone 15 mL Oral Q4H PRN Sindy Day MD   ammonium lactate 1 application Topical BID PRN Sindy Day MD   benzonatate 100 mg Oral TID PRN Sindy Day MD   benztropine 1 mg Intramuscular Q8H PRN Sindy Day MD   carbamide peroxide 5 drop Left Ear BID PRN Pancho Rodriguez MD   cloZAPine 25 mg Oral BID Sindy Day MD   cloZAPine 50 mg Oral BID Sindy Day MD   docusate sodium 100 mg Oral BID PRN Sindy Day MD   EPINEPHrine PF 0 15 mg Intramuscular Once PRN Sindy Day MD   fluticasone-vilanterol 1 puff Inhalation Daily Sindy Day MD   ketotifen 1 drop Right Eye BID PRN Sindy Day MD   levothyroxine 125 mcg Oral Early Morning Sindy Day MD   magnesium hydroxide 30 mL Oral Daily PRN Sindy Day MD   montelukast 10 mg Oral HS Sindy Day MD   OLANZapine 5 mg Intramuscular Q8H PRN Sindy Day MD   OLANZapine 5 mg Oral Q8H PRN Sindy Day MD   ondansetron 4 mg Oral Q6H PRN Sindy Day MD   pantoprazole 40 mg Oral Early Morning Sindy Day MD   polyethylene glycol 17 g Oral Daily PRN Sindy Day MD   polyvinyl alcohol 1 drop Both Eyes Q3H PRN Sindy Day MD   sertraline 175 mg Oral Daily Sindy Day MD   sucralfate 1,000 mg Oral BID Saurav Berman MD   theophylline 200 mg Oral Daily Sindy Day MD   tiotropium 18 mcg Inhalation Daily Sindy Day MD   traZODone 25 mg Oral HS PRN Sindy Day MD       Counseling / Coordination of Care: Total floor / unit time spent today 15 minutes  Greater than 50% of total time was spent with the patient and / or family counseling and / or somewhat receptive to supportive listening and teaching positive coping skills to deal with symptom mangement  Patient's Rights, confidentiality and exceptions to confidentiality, use of automated medical record, Jeb Patrick staff access to medical record, and consent to treatment reviewed

## 2020-04-12 NOTE — PLAN OF CARE
Problem: Alteration in Thoughts and Perception  Goal: Agree to be compliant with medication regime, as prescribed and report medication side effects  Description  Interventions:  - Offer appropriate PRN medication and supervise ingestion; conduct aims, as needed   Outcome: Progressing     Problem: Nutrition/Hydration-ADULT  Goal: Nutrient/Hydration intake appropriate for improving, restoring or maintaining nutritional needs  Description  Monitor and assess patient's nutrition/hydration status for malnutrition  Collaborate with interdisciplinary team and initiate plan and interventions as ordered  Monitor patient's weight and dietary intake as ordered or per policy  Utilize nutrition screening tool and intervene as necessary  Determine patient's food preferences and provide high-protein, high-caloric foods as appropriate       INTERVENTIONS:  - Monitor oral intake, urinary output, labs, and treatment plans  - Assess nutrition and hydration status and recommend course of action  - Evaluate amount of meals eaten  - Assist patient with eating if necessary   - Allow adequate time for meals  - Recommend/ encourage appropriate diets, oral nutritional supplements, and vitamin/mineral supplements  - Order, calculate, and assess calorie counts as needed  - Recommend, monitor, and adjust tube feedings and TPN/PPN based on assessed needs  - Assess need for intravenous fluids  - Provide specific nutrition/hydration education as appropriate  - Include patient/family/caregiver in decisions related to nutrition  Outcome: Progressing     Problem: Alteration in Thoughts and Perception  Goal: Attend and participate in unit activities, including therapeutic, recreational, and educational groups  Description  Interventions:  - Provide therapeutic and educational activities daily, encourage attendance and participation, and document same in the medical record     [address] :    Complete peer assessment with patient to assess their needs and identify their goals to complete while in the recovery program as well as once discharged into the community  Patient will complete WRAP Plan, Crisis Plan and 5 Life Domains  Patient will attend 50% of groups offered on the unit  Patient will complete a goal card weekly  Outcome: Not Progressing     Problem: Depression  Goal: Refrain from isolation  Description  Interventions:  - Develop a trusting relationship   - Encourage socialization   Outcome: Not Progressing     [address] has been isolative to her room & bed throughout shift  She did come out for scheduled medicine (except Singulair)  Had 50% meal (mashed potato, baked tomato, dessert) & an Ensure  Declined HS snack because wanted ice cream, there wasn't any & was not interested in the other choices  Felt had eaten well enough @ meal to forego snack  Was cheerful, enjoyed chatting about favorite Easter candies  Some brief socialization w/select peers when was out in phone chair catching her breath going between dining room & room  Did use the Incentive Spirometer achieving mostly 1100ml, some breaths 1250ml  With encouragement did drink some water, these latter two activities in the interests of raising, improving her BP  Reminded that her immobility & failure to come to kitchen for water when it is announced & given out makes for low BP's  She did not take part in any offered evening groups  Wearing now her QHS humidified nasal O2 @ 1L for bed

## 2020-04-12 NOTE — PLAN OF CARE
Problem: Alteration in Thoughts and Perception  Goal: Agree to be compliant with medication regime, as prescribed and report medication side effects  Description  Interventions:  - Offer appropriate PRN medication and supervise ingestion; conduct aims, as needed   Outcome: Progressing  Goal: Attend and participate in unit activities, including therapeutic, recreational, and educational groups  Description  Interventions:  - Provide therapeutic and educational activities daily, encourage attendance and participation, and document same in the medical record     CERTIFIED PEER SPECIALIST INTERVENTIONS:    Complete peer assessment with patient to assess their needs and identify their goals to complete while in the recovery program as well as once discharged into the community  Patient will complete WRAP Plan, Crisis Plan and 5 Life Domains  Patient will attend 50% of groups offered on the unit  Patient will complete a goal card weekly  Outcome: Not Progressing  Goal: Complete daily ADLs, including personal hygiene independently, as able  Description  Interventions:  - Observe, teach, and assist patient with ADLS  - Monitor and promote a balance of rest/activity, with adequate nutrition and elimination   Outcome: Not Mynor Croft has been isolative to her room for the majority of the shift, no groups attended  Only coming out for medications and meals  Appetite remains poor, eating 25% of breakfast and 25% lunch  No somatic complaints verbalized to this writer, disheveled in appearance, did not tend to her hygiene, isolating herself in her room in between meals  Otherwise, behaviors remain controlled  Will continue to monitor

## 2020-04-12 NOTE — PROGRESS NOTES
Sleeping at this time, no distress noted  O2 on at 1L NC  All precautions in place and maintained   Monitoring continues

## 2020-04-12 NOTE — PROGRESS NOTES
Psychiatry Progress Note [address] 60 y o  female MRN: [id number]  [facility] [address] Encounter: [id number]  Code Status: Level 1 - Full Code    PCP: Praveen Barrios PA-C    Date of Admission:  7/23/2019 2612   Date of Service:  04/12/20  Patient Active Problem List   Diagnosis    COPD with asthma ([facility]    Tobacco use disorder, continuous    Compression fracture of L4 lumbar vertebra    Ventral hernia    Acute on chronic respiratory failure with hypoxia ([facility] )    [facility], [facility]    Acquired hypothyroidism    Gastroesophageal reflux disease without esophagitis    Abnormal CT of the chest    Excessive cerumen in left ear canal    Lipoma of right upper extremity    Noncompliant with deep vein thrombosis (DVT) prophylaxis    At risk for aspiration    Ear ache     Diagnosis schizoaffective bipolar    Assessment   Overall Status: improving    Certification Statement: The patient will continue to require additional inpatient hospital stay to assess ability to maintain improvement by attending to ADLs and taking showers regular and see how she does on outing with family and the overnight pass at the personal care home once the virus restrictions are lifted   Acceptance by patient: accepting now  Radha Vincent in recovery: living at the [address] [place] [address] Rd soon   Understanding of medications: yes     Involved in reintegration process: on hold due to [address] in relationship with psychiatrist: trusting now     Medication changes    None today    Non-pharmacological treatments   Continue with individual, group, milieu and occupational therapy using recovery principles and psycho-education about accepting illness and the need for treatment   Encourage to take showers twice a week and cooperate with treatment and adl skills as per her behav   Plan   Another therapeutic pass with sister now that she did well  with the assigned staff escort to the park but it is now on hold due to [address] virus   Prepare for the [address] [place] [address] Rd and encouraged to accept  rather than refuse it    Safety   Safety and communication plan established to target dynamic risk factors discussed above  Discharge Plan   · back to a [facility] at [address]   Patient was found sitting in the dining arrieta eating her breakfast wearing the facial mask as required  She is still focused on going to the above and beyond private personal care home despite being accepted the [facility] personal care home  She remains passive-aggressive in attending groups and her group attendance has diminished lately  She did take a shower 2 days ago   She is now debating if she should go to the [facility] personal care home with a already accepted her but does not wish to go to [facility]   She is still suspicious as to the motives of the staff and examines her medications physically before taking them and still suspects some of the staff maybe tampering with her medications as well as her inhaler and the oxygen concentrator  She claims to feel sad and depressed but does not appear to be depressed clinically but refusing to make any changes in her medications when offered and still claims that she has had panic attacks but the staff hat not witnessed any physical symptoms panic  She does not admit to any overt delusional believes but her actions and demeanor indicate that she must be harboring some underlying paranoia  She is compliant with medications  She is preferring to stay back on her bed not always taking showers despite reminding her to take showers twice a week as per her behavior  She continues to need lot of reassurance and verbal support because of her underlying psychosomatic symptoms    Sleep:  Fair  Appetite:  Fair she picks and chooses some of her food but no weight loss noted  Side effects:  None  Review of systems: Review of systems unremarkable except for her psychosomatic complaints about fear of dying from corona virus, from heart attack, from shortness of breath and from choking on food etc   Still preoccupied with left ear being blocked despite using eardrops     Mental Status Exam  Appearance: age appropriate and older than stated age, wearing a mask,  poorly groomed with uncombed long hair sitting in the dining arrieta, somewhat anxious appearing not very well groomed or kept   Behavior: evasive and guarded superficially friendly pleasant anxious and suspicious at times   Speech: delayed, increased latency of response and tangential   Mood: anxious, euphoric and irritable,    Affect: increased in intensity, increased in range, mood-congruent and redirectable  Thought Process: circumstantial, concrete, goal directed, illogical and perserverative tendency to have stilted speech  Thought Content: delusions  grandiose and persecutory no overt delusions elicited but appears suspicious of staff tampering with her medications inhalant etc   Still psychosomatic about fear of death from corona virus heart attack etc needing frequent support and reassurance  Continues to believe that she has earwax in her left ear  No current suicidal homicidal thoughts intent or plans reported  No phobias obsessions or compulsions otherwise  Danis Zavaleta   Perceptual Disturbances: None   Risk Potential: Inability to care for herself and refusal to take showers regularly  Sensorium: person, place, time/date, situation, day of week, month of year, year and time  Cognition: grossly intact aware of current events like the corona virus, no deficit in language, no deficit in recent or remote memory  Consciousness: alert and awake   Attention: Fair  Intellect: Average  Insight: Limited  Judgment: fair   Motor Activity: no abnormal movements    Vitals  Temp:  [97 6 °F (36 4 °C)] 97 6 °F (36 4 °C)  HR:  [67] 67  Resp:  [16] 16  BP: (84)/(62) 84/62  SpO2:  [93 %] 93 %  No intake or output data in the 24 hours ending 04/12/20 1015    Lab Results: No New Labs Available For Today  Results from last 7 days   Lab Units 04/11/20  0630   WBC Thousand/uL 8 20   RBC Million/uL 5 08   HEMOGLOBIN g/dL 15 8   HEMATOCRIT % 47 7*   MCV fL 94   PLATELETS Thousands/uL 201   NEUTROS ABS Thousands/µL 4 30         Current Facility-Administered Medications:  acetaminophen 325 mg Oral Q6H PRN Roberto Shankar MD   acetaminophen 650 mg Oral Q6H PRN Roberto Shankar MD   acetaminophen 650 mg Oral Q8H PRN Roberto Shankar MD   albuterol 2 puff Inhalation Q4H PRN Roberto Shankar MD   aluminum-magnesium hydroxide-simethicone 15 mL Oral Q4H PRN Roberto Shankar MD   ammonium lactate 1 application Topical BID PRN Roberto Shankar MD   benzonatate 100 mg Oral TID PRN Roberto Shankar MD   benztropine 1 mg Intramuscular Q8H PRN Roberto Shankar MD   carbamide peroxide 5 drop Left Ear BID PRN Kat Felder MD   cloZAPine 25 mg Oral BID Roberto Shankar MD   cloZAPine 50 mg Oral BID Roberto Shankar MD   docusate sodium 100 mg Oral BID PRN Roberto Shankar MD   EPINEPHrine PF 0 15 mg Intramuscular Once PRN Roberto Shankar MD   fluticasone-vilanterol 1 puff Inhalation Daily Roberto Shankar MD   ketotifen 1 drop Right Eye BID PRN Roberto Shankar MD   levothyroxine 125 mcg Oral Early Morning Roberto Shankar MD   magnesium hydroxide 30 mL Oral Daily PRN Roberto Shankar MD   montelukast 10 mg Oral HS Roberto Shankar MD   OLANZapine 5 mg Intramuscular Q8H PRN Roberto Shankar MD   OLANZapine 5 mg Oral Q8H PRN Roberto Shankar MD   ondansetron 4 mg Oral Q6H PRN Roberto Shankar MD   pantoprazole 40 mg Oral Early Morning Roberto Shankar MD   polyethylene glycol 17 g Oral Daily PRN Roberto Shankar MD   polyvinyl alcohol 1 drop Both Eyes Q3H PRN Robreto Shankar MD   sertraline 175 mg Oral Daily Roberto Shankar MD   sucralfate 1,000 mg Oral BID Mary Barnhart MD   theophylline 200 mg Oral Daily Roberto Shankar MD   tiotropium 18 mcg Inhalation Daily Roberto Shankar MD   traZODone 25 mg Oral HS PRN Roberto Shankar MD       Counseling / Coordination of Care: Total floor / unit time spent today 15 minutes  Greater than 50% of total time was spent with the patient and / or family counseling and / or somewhat receptive to supportive listening and teaching positive coping skills to deal with symptom mangement  Patient's Rights, confidentiality and exceptions to confidentiality, use of automated medical record, Jeb Patrick staff access to medical record, and consent to treatment reviewed

## 2020-04-13 NOTE — PLAN OF CARE
Problem: Alteration in Thoughts and Perception  Goal: Verbalize thoughts and feelings  Description  Interventions:  - Promote a nonjudgmental and trusting relationship with the patient through active listening and therapeutic communication  - Assess patient's level of functioning, behavior and potential for risk  - Engage patient in 1 on 1 interactions for a minimum of 15 minutes each session  - Encourage patient to express fears, feelings, frustrations, and discuss symptoms    - [place] patient to reality, help patient recognize reality-based thinking   - Administer medications as ordered and assess for potential side effects  - Provide the patient education related to the signs and symptoms of the illness and desired effects of prescribed medications  Outcome: Progressing  Goal: Agree to be compliant with medication regime, as prescribed and report medication side effects  Description  Interventions:  - Offer appropriate PRN medication and supervise ingestion; conduct aims, as needed   Outcome: Progressing     Problem: Alteration in Thoughts and Perception  Goal: Attend and participate in unit activities, including therapeutic, recreational, and educational groups  Description  Interventions:  - Provide therapeutic and educational activities daily, encourage attendance and participation, and document same in the medical record     CERTIFIED PEER SPECIALIST INTERVENTIONS:    Complete peer assessment with patient to assess their needs and identify their goals to complete while in the recovery program as well as once discharged into the community  Patient will complete WRAP Plan, Crisis Plan and 5 Life Domains  Patient will attend 50% of groups offered on the unit  Patient will complete a goal card weekly      Outcome: Not Progressing  Goal: Complete daily ADLs, including personal hygiene independently, as able  Description  Interventions:  - Observe, teach, and assist patient with ADLS  - Monitor and promote a balance of rest/activity, with adequate nutrition and elimination   Outcome: Not Progressing     Problem: Depression  Goal: Refrain from isolation  Description  Interventions:  - Develop a trusting relationship   - Encourage socialization   Outcome: Not Progressing     Problem: RESPIRATORY - ADULT  Goal: Achieves optimal ventilation and oxygenation  Description  INTERVENTIONS:  - Assess for changes in respiratory status  - Assess for changes in mentation and behavior  - Position to facilitate oxygenation and minimize respiratory effort  - Oxygen administration by appropriate delivery method based on oxygen saturation (per order) or ABGs  - Initiate smoking cessation education as indicated  - Encourage broncho-pulmonary hygiene including cough, deep breathe, Incentive Spirometry  - Assess the need for suctioning and aspirate as needed  - Assess and instruct to report SOB or any respiratory difficulty  - Respiratory Therapy support as indicated  Outcome: Not Progressing     Problem: Nutrition/Hydration-ADULT  Goal: Nutrient/Hydration intake appropriate for improving, restoring or maintaining nutritional needs  Description  Monitor and assess patient's nutrition/hydration status for malnutrition  Collaborate with interdisciplinary team and initiate plan and interventions as ordered  Monitor patient's weight and dietary intake as ordered or per policy  Utilize nutrition screening tool and intervene as necessary  Determine patient's food preferences and provide high-protein, high-caloric foods as appropriate       INTERVENTIONS:  - Monitor oral intake, urinary output, labs, and treatment plans  - Assess nutrition and hydration status and recommend course of action  - Evaluate amount of meals eaten  - Assist patient with eating if necessary   - Allow adequate time for meals  - Recommend/ encourage appropriate diets, oral nutritional supplements, and vitamin/mineral supplements  - Order, calculate, and assess calorie counts as needed  - Recommend, monitor, and adjust tube feedings and TPN/PPN based on assessed needs  - Assess need for intravenous fluids  - Provide specific nutrition/hydration education as appropriate  - Include patient/family/caregiver in decisions related to nutrition  Outcome: Not Progressing     [address] has been isolative to her room & bed for entirety of shift w/exception of coming out for meal (ate 50% mashed potato, stuffing, apple crisp & had an Ensure), for HS snack, for scheduled medicines (except Singulair)  Did not attend either of the optional group offerings this shift  Performance very poor on Incentive Spirometer this shift; goal 1250ml, got only 900ml  Does admit hasn't hardly moved in several days & needs to make more effort to be active  Pointed out to her that spirometer performance demonstrates results of inactivity which is detrimental to her lung function  She is unkempt, teeth dirty, no effort @ hygiene  She is wearing now her QHS humidified nasal O2 @ 1L for bed

## 2020-04-13 NOTE — TREATMENT TEAM
Patient declined      04/13/20 1100   Activity/Group Checklist   Group   (IMR/Goal Setting )   Attendance Did not attend   Attendance Duration (min) 46-60   Affect/Mood IRMA

## 2020-04-13 NOTE — PLAN OF CARE
Problem: Alteration in Thoughts and Perception  Goal: Agree to be compliant with medication regime, as prescribed and report medication side effects  Description  Interventions:  - Offer appropriate PRN medication and supervise ingestion; conduct aims, as needed   Outcome: Progressing     Problem: Risk for Self Injury/Neglect  Goal: Treatment Goal: Remain safe during length of stay, learn and adopt new coping skills, and be free of self-injurious ideation, impulses and acts at the time of discharge  Outcome: Progressing  Goal: Refrain from harming self  Description  Interventions:  - Monitor patient closely, per order  - Develop a trusting relationship  - Supervise medication ingestion, monitor effects and side effects   Outcome: Progressing     Problem: Anxiety  Goal: Anxiety is at manageable level  Description  Interventions:  - Assess and monitor patient's anxiety level  - Monitor for signs and symptoms of anxiety both physical and emotional (heart palpitations, chest pain, shortness of breath, headaches, nausea, feeling jumpy, restlessness, irritable, apprehensive)  - Collaborate with interdisciplinary team and initiate plan and interventions as ordered    - [place] patient to unit/surroundings  - Explain treatment plan  - Encourage participation in care  - Encourage verbalization of concerns/fears  - Identify coping mechanisms  - Assist in developing anxiety-reducing skills  - Administer/offer alternative therapies  - Limit or eliminate stimulants  Outcome: Progressing     Problem: PAIN - ADULT  Goal: Verbalizes/displays adequate comfort level or baseline comfort level  Description  Interventions:  - Encourage patient to monitor pain and request assistance  - Assess pain using appropriate pain scale  - Administer analgesics based on type and severity of pain and evaluate response  - Implement non-pharmacological measures as appropriate and evaluate response  - Consider cultural and social influences on pain and pain management  - Notify physician/advanced practitioner if interventions unsuccessful or patient reports new pain  Outcome: Progressing     Problem: SAFETY ADULT  Goal: Patient will remain free of falls  Description  INTERVENTIONS:  - Assess patient frequently for physical needs  -  Identify cognitive and physical deficits and behaviors that affect risk of falls    -  [place] fall precautions as indicated by assessment   - Educate patient/family on patient safety including physical limitations  - Instruct patient to call for assistance with activity based on assessment  - Modify environment to reduce risk of injury  - Consider OT/PT consult to assist with strengthening/mobility  Outcome: Progressing     Problem: Alteration in Thoughts and Perception  Goal: Verbalize thoughts and feelings  Description  Interventions:  - Promote a nonjudgmental and trusting relationship with the patient through active listening and therapeutic communication  - Assess patient's level of functioning, behavior and potential for risk  - Engage patient in 1 on 1 interactions for a minimum of 15 minutes each session  - Encourage patient to express fears, feelings, frustrations, and discuss symptoms    - [place] patient to reality, help patient recognize reality-based thinking   - Administer medications as ordered and assess for potential side effects  - Provide the patient education related to the signs and symptoms of the illness and desired effects of prescribed medications  Outcome: Not Progressing  Goal: Attend and participate in unit activities, including therapeutic, recreational, and educational groups  Description  Interventions:  - Provide therapeutic and educational activities daily, encourage attendance and participation, and document same in the medical record     CERTIFIED PEER SPECIALIST INTERVENTIONS:    Complete peer assessment with patient to assess their needs and identify their goals to complete while in the recovery program as well as once discharged into the community  Patient will complete WRAP Plan, Crisis Plan and 5 Life Domains  Patient will attend 50% of groups offered on the unit  Patient will complete a goal card weekly      Outcome: Not Progressing     Problem: Ineffective Coping  Goal: Participates in unit activities  Description  Interventions:  - Provide therapeutic environment   - Provide required programming   - Redirect inappropriate behaviors   Outcome: Not Progressing     Problem: Risk for Self Injury/Neglect  Goal: Verbalize thoughts and feelings  Description  Interventions:  - Assess and re-assess patient's lethality and potential for self-injury  - Engage patient in 1:1 interactions, daily, for a minimum of 15 minutes  - Encourage patient to express feelings, fears, frustrations, hopes  - Establish rapport/trust with patient   Outcome: Not Progressing  Goal: Attend and participate in unit activities, including therapeutic, recreational, and educational groups  Description  Interventions:  - Provide therapeutic and educational activities daily, encourage attendance and participation, and document same in the medical record  - Obtain collateral information, encourage visitation and family involvement in care   Outcome: Not Progressing     Problem: Depression  Goal: Treatment Goal: Demonstrate behavioral control of depressive symptoms, verbalize feelings of improved mood/affect, and adopt new coping skills prior to discharge  Outcome: Not Progressing  Goal: Verbalize thoughts and feelings  Description  Interventions:  - Assess and re-assess patient's level of risk   - Engage patient in 1:1 interactions, daily, for a minimum of 15 minutes   - Encourage patient to express feelings, fears, frustrations, hopes   Outcome: Not Progressing  Goal: Refrain from isolation  Description  Interventions:  - Develop a trusting relationship   - Encourage socialization   Outcome: Not Progressing  Goal: Refrain from self-neglect  Outcome: Not Progressing     Problem: Nutrition/Hydration-ADULT  Goal: Nutrient/Hydration intake appropriate for improving, restoring or maintaining nutritional needs  Description  Monitor and assess patient's nutrition/hydration status for malnutrition  Collaborate with interdisciplinary team and initiate plan and interventions as ordered  Monitor patient's weight and dietary intake as ordered or per policy  Utilize nutrition screening tool and intervene as necessary  Determine patient's food preferences and provide high-protein, high-caloric foods as appropriate  INTERVENTIONS:  - Monitor oral intake, urinary output, labs, and treatment plans  - Assess nutrition and hydration status and recommend course of action  - Evaluate amount of meals eaten  - Assist patient with eating if necessary   - Allow adequate time for meals  - Recommend/ encourage appropriate diets, oral nutritional supplements, and vitamin/mineral supplements  - Order, calculate, and assess calorie counts as needed  - Recommend, monitor, and adjust tube feedings and TPN/PPN based on assessed needs  - Assess need for intravenous fluids  - Provide specific nutrition/hydration education as appropriate  - Include patient/family/caregiver in decisions related to nutrition  Outcome: Not Progressing   Isolative to her bed in her room with the exception of coming out for meds and meals  Did not attend any groups  Minimally interacted with others  Ate 25% of breakfast and 10% of lunch  No other behaviors or issues noted  Continue to monitor

## 2020-04-13 NOTE — PROGRESS NOTES
Psychiatry Progress Note [address] 60 y o  female MRN: [id number]  Unit/Bed#: [facility] 526-96 Encounter: [id number]  Code Status: Level 1 - Full Code    PCP: Catie Feliz PA-C    Date of Admission:  7/23/2019 1730   Date of Service:  04/13/20  Patient Active Problem List   Diagnosis    COPD with asthma ([facility] )    Tobacco use disorder, continuous    Compression fracture of L4 lumbar vertebra    Ventral hernia    Acute on chronic respiratory failure with hypoxia ([facility] )    Schizoaffective disorder, bipolar type ([facility] )    Acquired hypothyroidism    Gastroesophageal reflux disease without esophagitis    Abnormal CT of the chest    Excessive cerumen in left ear canal    Lipoma of right upper extremity    Noncompliant with deep vein thrombosis (DVT) prophylaxis    At risk for aspiration    Ear ache     Diagnosis schizoaffective bipolar    Assessment   Overall Status: improving but somewhat passive aggressive   Certification Statement: The patient will continue to require additional inpatient hospital stay to assess ability to maintain improvement by attending to ADLs and taking showers regular and see how she does on outing with family and the overnight pass at the personal care home once the virus restrictions are lifted   Acceptance by patient: accepting now  Rich Anderson in recovery: living at the [address] [place] [address] Rd soon   Understanding of medications: yes     Involved in reintegration process: on hold due to [address] in relationship with psychiatrist: trusting now     Medication changes    None today    Non-pharmacological treatments   Continue with individual, group, milieu and occupational therapy using recovery principles and psycho-education about accepting illness and the need for treatment   Encourage to take showers twice a week and cooperate with treatment and adl skills as per her behav   Plan   Another therapeutic pass with sister now that she did well  with the assigned staff escort to the park but it is now on hold due to [address] [facility] for the [address] [place] [address] Rd and encouraged to accept  rather than refuse it    Safety   Safety and communication plan established to target dynamic risk factors discussed above  Discharge Plan   · back to a [facility] at [address]   Patient is becoming passive-aggressive by not attending most of the groups and staying on her bed and coming out for medications and for meals  Her hygiene has also deteriorated with poor grooming and disheveled except appearance  She is continuing to debate if she should go to the [facility] personal care home with a already accepted her but does not wish to go to [facility]    She states she is depressed when though she does not come across as depressed clinically and again refusing to increase the Zoloft that was recommended She is still suspicious as to the motives of the staff and examines her medications physically before taking them and still suspects some of the staff maybe tampering with her medications as well as her inhaler and the oxygen concentrator  She again claims that she has had panic attacks but the staff hat not witnessed any physical symptoms of panic attacks  She does not admit to any overt delusional believes but her actions and demeanor indicate that she must be harboring some underlying paranoia  She is compliant with medications  She is preferring to stay back on her bed not always taking showers despite reminding her to take showers twice a week as per her behavior  She continues to need lot of reassurance and verbal support because of her underlying psychosomatic symptoms      Sleep:  Fair  Appetite:  Fair she picks and chooses some of her food but no weight loss noted  Side effects:  None  Review of systems: Review of systems unremarkable except for her psychosomatic complaints about fear of dying from corona virus, from heart attack, from shortness of breath and from choking on food etc     Mental Status Exam  Appearance: age appropriate and older than stated age, wearing a mask,  poorly groomed with uncombed long hair sitting in the dining [place]ri[place], somewhat anxious appearing not very well groomed or kept   Behavior: evasive and guarded superficially friendly pleasant anxious and suspicious at times   Speech: delayed, increased latency of response and tangential   Mood: anxious, euphoric and irritable,    Affect: increased in intensity, increased in range, mood-congruent and redirectable  Thought Process: circumstantial, concrete, goal directed, illogical and perserverative tendency to have stilted speech  Thought Content: delusions  grandiose and persecutory no overt delusions elicited but appears suspicious of staff tampering with her medications inhalant etc   Still psychosomatic about fear of death from corona virus heart attack etc needing frequent support and reassurance  Continues to believe that she has earwax in her left ear  No current suicidal homicidal thoughts intent or plans reported  No phobias obsessions or compulsions otherwise  Iman Jasso   Perceptual Disturbances: None   Risk Potential: Inability to care for herself and refusal to take showers regularly  Sensorium: person, place, time/date, situation, day of week, month of year, year and time  Cognition: grossly intact aware of current events like the corona virus, no deficit in language, no deficit in recent or remote memory  Consciousness: alert and awake   Attention: Fair  Intellect: Average  Insight: Limited  Judgment: fair   Motor Activity: no abnormal movements    Vitals  Temp:  [97 8 °F (36 6 °C)] 97 8 °F (36 6 °C)  HR:  [75] 75  Resp:  [16] 16  BP: (100)/(62) 100/62  No intake or output data in the 24 hours ending 04/13/20 0819    Lab Results: No New Labs Available For Today  Results from last 7 days   Lab Units 04/11/20  0630   WBC Thousand/uL 8 20   RBC Million/uL 5 08   HEMOGLOBIN g/dL 15 8   HEMATOCRIT % 47 7*   MCV fL 94   PLATELETS Thousands/uL 201   NEUTROS ABS Thousands/µL 4 30         Current Facility-Administered Medications:  acetaminophen 325 mg Oral Q6H PRN Jennifer Taveras MD   acetaminophen 650 mg Oral Q6H PRN Jennifer Taveras MD   acetaminophen 650 mg Oral Q8H PRN Jennifer Taveras MD   albuterol 2 puff Inhalation Q4H PRN Jennifer Taveras MD   aluminum-magnesium hydroxide-simethicone 15 mL Oral Q4H PRN Jennifer Taveras MD   ammonium lactate 1 application Topical BID PRN Jennifer Taveras MD   benzonatate 100 mg Oral TID PRN Jennifer Taveras MD   benztropine 1 mg Intramuscular Q8H PRN Jennifer Taveras MD   carbamide peroxide 5 drop Left Ear BID PRN Praveena Dong MD   cloZAPine 25 mg Oral BID Jennifer Taveras MD   cloZAPine 50 mg Oral BID Jennifer Taveras MD   docusate sodium 100 mg Oral BID PRN Jennifer Taveras MD   EPINEPHrine PF 0 15 mg Intramuscular Once PRN Jennifer Taveras MD   fluticasone-vilanterol 1 puff Inhalation Daily Jennifer Taveras MD   ketotifen 1 drop Right Eye BID PRN Jennifer Taveras MD   levothyroxine 125 mcg Oral Early Morning Jennifer Taveras MD   magnesium hydroxide 30 mL Oral Daily PRN Jennifer Taveras MD   montelukast 10 mg Oral HS Jennifer Taveras MD   OLANZapine 5 mg Intramuscular Q8H PRN Jennifer Taveras MD   OLANZapine 5 mg Oral Q8H PRN Jennifer Taveras MD   ondansetron 4 mg Oral Q6H PRN Jennifer Taveras MD   pantoprazole 40 mg Oral Early Morning Jennifer Taveras MD   polyethylene glycol 17 g Oral Daily PRN Jennifer Taveras MD   polyvinyl alcohol 1 drop Both Eyes Q3H PRN Jennifer Taveras MD   sertraline 175 mg Oral Daily Jennifer Taveras MD   sucralfate 1,000 mg Oral BID Fletcher Nevarez MD   theophylline 200 mg Oral Daily Jennifer Taveras MD   tiotropium 18 mcg Inhalation Daily Jennifer Taveras MD   traZODone 25 mg Oral HS PRN Jennifer Taveras MD       Counseling / Coordination of Care: Total floor / unit time spent today 15 minutes   Greater than 50% of total time was spent with the patient and / or family counseling and / or somewhat receptive to supportive listening and teaching positive coping skills to deal with symptom mangement  Patient's Rights, confidentiality and exceptions to confidentiality, use of automated medical record, Jeb Patrick staff access to medical record, and consent to treatment reviewed

## 2020-04-13 NOTE — TREATMENT TEAM
Patient declined      04/13/20 0900   Activity/Group Checklist   Group Community meeting  (Goal Cards )   Attendance Did not attend   Attendance Duration (min) 16-30   Affect/Mood IRMA

## 2020-04-13 NOTE — PLAN OF CARE

## 2020-04-13 NOTE — PROGRESS NOTES
Sleeping at this time, no distress noted  O2 on 1L NC   Safe and fall precautions in place and monitoring continues

## 2020-04-13 NOTE — PROGRESS NOTES
04/13/20 0517   Team Meeting   Meeting Type Daily Rounds   Team Members Present   Team Members Present Physician;Nurse;; Other (Discipline and Name)   Physician Team Member Dr Pia Edwards Team Member Roverto Gonzalez, RN   Care Management Team Member ASHLYN Daly   Other (Discipline and Name) SHANIKA Mason   Patient/Family Present   Patient Present Yes   Patient's Family Present No     Poor appetite, isolative, and finally showered 4/10  Patient remains somatic

## 2020-04-14 NOTE — PROGRESS NOTES
04/14/20 1128   Team Meeting   Meeting Type Tx Team Meeting   Initial Conference Date 04/14/20   Next Conference Date 04/21/20   Team Members Present   Team Members Present Physician;Nurse;; Other (Discipline and Name)   Physician Team Member Dr Darrin Khan Team Member Arturo Botello, RN   Care Management Team Member ASHLYN De La Garza   Other (Discipline and Name) Terry Krishnan LCSW; Preeti [facility] [address]   Patient/Family Present   Patient Present Yes   Patient's Family Present No     Patient attended treatment team meeting this morning prepared without self-assessment  Patient's group attendance for last week was bare  Team and patient completed risk assessment and the patient did not verbalize any desire to elope from the program  Patient verbalized understanding of consequences of eloping from treatment while on a commitment  Patient verbalized no further questions or concerns at the conclusion of the meeting  Next team meeting scheduled for 4/21/2020  Patient reports she will be working on getting back into groups and fight her fear

## 2020-04-14 NOTE — PROGRESS NOTES
04/14/20 0800   Team Meeting   Meeting Type Daily Rounds   Team Members Present   Team Members Present Physician;Nurse;; Other (Discipline and Name)   Physician Team Member Dr Aaron Curtis Team Member Radha Nance RN   Care Management Team Member Crecencio Meigs, MSW   Other (Discipline and Name) JER SotoW; Bharathi Ramirez, SHANIKA     Patient presents as depressed, not engaged in programming

## 2020-04-14 NOTE — TREATMENT TEAM
Patient declined      04/14/20 0900   Activity/Group Checklist   Group Exercise  (Morning Stretch )   Attendance Did not attend   Attendance Duration (min) 16-30   Affect/Mood IRMA

## 2020-04-14 NOTE — TREATMENT TEAM
Patient declined      04/14/20 1100   Activity/Group Checklist   Group   (IMR/Positive Affirmation Shashank )   Attendance Did not attend   Attendance Duration (min) 46-60   Affect/Mood IRMA

## 2020-04-14 NOTE — PROGRESS NOTES
Maggie maintained on ongoing fall and SAFE precaution  Lequita Nip in bed with eyes closed, breath even and unlabored   On O2 with humidifier @1L/m via nasal cannula   Continues rounding implemented   No somatic complaint overnight  No PRN needed for sleep aid   No indication of pain or discomfort

## 2020-04-14 NOTE — PLAN OF CARE
Problem: Alteration in Thoughts and Perception  Goal: Verbalize thoughts and feelings  Description  Interventions:  - Promote a nonjudgmental and trusting relationship with the patient through active listening and therapeutic communication  - Assess patient's level of functioning, behavior and potential for risk  - Engage patient in 1 on 1 interactions for a minimum of 15 minutes each session  - Encourage patient to express fears, feelings, frustrations, and discuss symptoms    - [place] patient to reality, help patient recognize reality-based thinking   - Administer medications as ordered and assess for potential side effects  - Provide the patient education related to the signs and symptoms of the illness and desired effects of prescribed medications  Outcome: Progressing  Goal: Agree to be compliant with medication regime, as prescribed and report medication side effects  Description  Interventions:  - Offer appropriate PRN medication and supervise ingestion; conduct aims, as needed   Outcome: Progressing     Problem: Ineffective Coping  Goal: Patient/Family verbalizes awareness of resources  Outcome: Progressing  Goal: Understands least restrictive measures  Description  Interventions:  - Utilize least restrictive behavior  Outcome: Progressing     Problem: Risk for Self Injury/Neglect  Goal: Treatment Goal: Remain safe during length of stay, learn and adopt new coping skills, and be free of self-injurious ideation, impulses and acts at the time of discharge  Outcome: Progressing  Goal: Verbalize thoughts and feelings  Description  Interventions:  - Assess and re-assess patient's lethality and potential for self-injury  - Engage patient in 1:1 interactions, daily, for a minimum of 15 minutes  - Encourage patient to express feelings, fears, frustrations, hopes  - Establish rapport/trust with patient   Outcome: Progressing  Goal: Refrain from harming self  Description  Interventions:  - Monitor patient closely, per order  - Develop a trusting relationship  - Supervise medication ingestion, monitor effects and side effects   Outcome: Progressing     Problem: Depression  Goal: Verbalize thoughts and feelings  Description  Interventions:  - Assess and re-assess patient's level of risk   - Engage patient in 1:1 interactions, daily, for a minimum of 15 minutes   - Encourage patient to express feelings, fears, frustrations, hopes   Outcome: Progressing     Problem: Alteration in Orientation  Goal: Interact with staff daily  Description  Interventions:  - Assess and re-assess patient's level of orientation  - Engage patient in 1 on 1 interactions, daily, for a minimum of 15 minutes   - Establish rapport/trust with patient   Outcome: Progressing     Problem: SAFETY ADULT  Goal: Patient will remain free of falls  Description  INTERVENTIONS:  - Assess patient frequently for physical needs  -  Identify cognitive and physical deficits and behaviors that affect risk of falls    -  [place] fall precautions as indicated by assessment   - Educate patient/family on patient safety including physical limitations  - Instruct patient to call for assistance with activity based on assessment  - Modify environment to reduce risk of injury  - Consider OT/PT consult to assist with strengthening/mobility  Outcome: Progressing     Problem: Alteration in Thoughts and Perception  Goal: Treatment Goal: Gain control of psychotic behaviors/thinking, reduce/eliminate presenting symptoms and demonstrate improved reality functioning upon discharge  Outcome: Not Progressing  Goal: Attend and participate in unit activities, including therapeutic, recreational, and educational groups  Description  Interventions:  - Provide therapeutic and educational activities daily, encourage attendance and participation, and document same in the medical record     CERTIFIED PEER SPECIALIST INTERVENTIONS:    Complete peer assessment with patient to assess their needs and identify their goals to complete while in the recovery program as well as once discharged into the community  Patient will complete WRAP Plan, Crisis Plan and [employer]  Patient will attend 50% of groups offered on the unit  Patient will complete a goal card weekly  Outcome: Not Progressing  Goal: Recognize dysfunctional thoughts, communicate reality-based thoughts at the time of discharge  Description  Interventions:  - Provide medication and psycho-education to assist patient in compliance and developing insight into his/her illness   Outcome: Not Progressing     Problem: Ineffective Coping  Goal: Participates in unit activities  Description  Interventions:  - Provide therapeutic environment   - Provide required programming   - Redirect inappropriate behaviors   Outcome: Not Progressing     Problem: Risk for Self Injury/Neglect  Goal: Attend and participate in unit activities, including therapeutic, recreational, and educational groups  Description  Interventions:  - Provide therapeutic and educational activities daily, encourage attendance and participation, and document same in the medical record  - Obtain collateral information, encourage visitation and family involvement in care   Outcome: Not Progressing  Goal: Recognize maladaptive responses and adopt new coping mechanisms  Outcome: Not Progressing     Problem: Depression  Goal: Treatment Goal: Demonstrate behavioral control of depressive symptoms, verbalize feelings of improved mood/affect, and adopt new coping skills prior to discharge  Outcome: Not Progressing  Goal: Refrain from isolation  Description  Interventions:  - Develop a trusting relationship   - Encourage socialization   Outcome: Not Progressing  Goal: Refrain from self-neglect  Outcome: Not Progressing     Problem: Anxiety  Goal: Anxiety is at manageable level  Description  Interventions:  - Assess and monitor patient's anxiety level     - Monitor for signs and symptoms of anxiety both physical and emotional (heart palpitations, chest pain, shortness of breath, headaches, nausea, feeling jumpy, restlessness, irritable, apprehensive)  - Collaborate with interdisciplinary team and initiate plan and interventions as ordered  - [place] patient to unit/surroundings  - Explain treatment plan  - Encourage participation in care  - Encourage verbalization of concerns/fears  - Identify coping mechanisms  - Assist in developing anxiety-reducing skills  - Administer/offer alternative therapies  - Limit or eliminate stimulants  Outcome: Not Progressing     Problem: Nutrition/Hydration-ADULT  Goal: Nutrient/Hydration intake appropriate for improving, restoring or maintaining nutritional needs  Description  Monitor and assess patient's nutrition/hydration status for malnutrition  Collaborate with interdisciplinary team and initiate plan and interventions as ordered  Monitor patient's weight and dietary intake as ordered or per policy  Utilize nutrition screening tool and intervene as necessary  Determine patient's food preferences and provide high-protein, high-caloric foods as appropriate  INTERVENTIONS:  - Monitor oral intake, urinary output, labs, and treatment plans  - Assess nutrition and hydration status and recommend course of action  - Evaluate amount of meals eaten  - Assist patient with eating if necessary   - Allow adequate time for meals  - Recommend/ encourage appropriate diets, oral nutritional supplements, and vitamin/mineral supplements  - Order, calculate, and assess calorie counts as needed  - Recommend, monitor, and adjust tube feedings and TPN/PPN based on assessed needs  - Assess need for intravenous fluids  - Provide specific nutrition/hydration education as appropriate  - Include patient/family/caregiver in decisions related to nutrition  Outcome: Not Progressing   Isolative to her bed in her room with the exception of coming out for meds and meals   Did not attend any groups  Minimally interacted with others  Ate 25% of both meals  Expressed concerns with wearing a mask r/t her COPD  Educated and provide reassurance in regards to same with which the patient was thankful  Also encouraged patient to be more active and participate in groups/activities  She said she'll "think about it"  No other behaviors or issues noted  Continue to monitor

## 2020-04-14 NOTE — PLAN OF CARE
Problem: Alteration in Thoughts and Perception  Goal: Treatment Goal: Gain control of psychotic behaviors/thinking, reduce/eliminate presenting symptoms and demonstrate improved reality functioning upon discharge  Outcome: Progressing  Goal: Verbalize thoughts and feelings  Description  Interventions:  - Promote a nonjudgmental and trusting relationship with the patient through active listening and therapeutic communication  - Assess patient's level of functioning, behavior and potential for risk  - Engage patient in 1 on 1 interactions for a minimum of 15 minutes each session  - Encourage patient to express fears, feelings, frustrations, and discuss symptoms    - [place] patient to reality, help patient recognize reality-based thinking   - Administer medications as ordered and assess for potential side effects  - Provide the patient education related to the signs and symptoms of the illness and desired effects of prescribed medications  Outcome: Progressing  Goal: Agree to be compliant with medication regime, as prescribed and report medication side effects  Description  Interventions:  - Offer appropriate PRN medication and supervise ingestion; conduct aims, as needed   Outcome: Progressing     Problem: Ineffective Coping  Goal: Identifies ineffective coping skills  Outcome: Progressing  Goal: Understands least restrictive measures  Description  Interventions:  - Utilize least restrictive behavior  Outcome: Progressing     Problem: Risk for Self Injury/Neglect  Goal: Treatment Goal: Remain safe during length of stay, learn and adopt new coping skills, and be free of self-injurious ideation, impulses and acts at the time of discharge  Outcome: Progressing  Goal: Refrain from harming self  Description  Interventions:  - Monitor patient closely, per order  - Develop a trusting relationship  - Supervise medication ingestion, monitor effects and side effects   Outcome: Progressing     Problem: Depression  Goal: Treatment Goal: Demonstrate behavioral control of depressive symptoms, verbalize feelings of improved mood/affect, and adopt new coping skills prior to discharge  Outcome: Progressing  Goal: Verbalize thoughts and feelings  Description  Interventions:  - Assess and re-assess patient's level of risk   - Engage patient in 1:1 interactions, daily, for a minimum of 15 minutes   - Encourage patient to express feelings, fears, frustrations, hopes   Outcome: Progressing     Problem: Anxiety  Goal: Anxiety is at manageable level  Description  Interventions:  - Assess and monitor patient's anxiety level  - Monitor for signs and symptoms of anxiety both physical and emotional (heart palpitations, chest pain, shortness of breath, headaches, nausea, feeling jumpy, restlessness, irritable, apprehensive)  - Collaborate with interdisciplinary team and initiate plan and interventions as ordered    - [place] patient to unit/surroundings  - Explain treatment plan  - Encourage participation in care  - Encourage verbalization of concerns/fears  - Identify coping mechanisms  - Assist in developing anxiety-reducing skills  - Administer/offer alternative therapies  - Limit or eliminate stimulants  Outcome: Progressing     Problem: PAIN - ADULT  Goal: Verbalizes/displays adequate comfort level or baseline comfort level  Description  Interventions:  - Encourage patient to monitor pain and request assistance  - Assess pain using appropriate pain scale  - Administer analgesics based on type and severity of pain and evaluate response  - Implement non-pharmacological measures as appropriate and evaluate response  - Consider cultural and social influences on pain and pain management  - Notify physician/advanced practitioner if interventions unsuccessful or patient reports new pain  Outcome: Progressing     Problem: SAFETY ADULT  Goal: Patient will remain free of falls  Description  INTERVENTIONS:  - Assess patient frequently for physical needs  -  Identify cognitive and physical deficits and behaviors that affect risk of falls  -  [place] fall precautions as indicated by assessment   - Educate patient/family on patient safety including physical limitations  - Instruct patient to call for assistance with activity based on assessment  - Modify environment to reduce risk of injury  - Consider OT/PT consult to assist with strengthening/mobility  Outcome: Progressing     Problem: RESPIRATORY - ADULT  Goal: Achieves optimal ventilation and oxygenation  Description  INTERVENTIONS:  - Assess for changes in respiratory status  - Assess for changes in mentation and behavior  - Position to facilitate oxygenation and minimize respiratory effort  - Oxygen administration by appropriate delivery method based on oxygen saturation (per order) or ABGs  - Initiate smoking cessation education as indicated  - Encourage broncho-pulmonary hygiene including cough, deep breathe, Incentive Spirometry  - Assess the need for suctioning and aspirate as needed  - Assess and instruct to report SOB or any respiratory difficulty  - Respiratory Therapy support as indicated  Outcome: Progressing   Patient has been isolative to self, sitting on bed awake most of the shift  Depressed affect on assessment, concerned about son in the [facility] and cooperative on approach, compliant with medications and had only an ensure for dinner  Labs, orders and vital signs have been reviewed  On routine checks, will continue to monitor

## 2020-04-14 NOTE — PROGRESS NOTES
Psychiatry Progress Note [address] 60 y o  female MRN: [id number]  Unit/Bed#: [facility] 609-11 Encounter: [id number]  Code Status: Level 1 - Full Code    PCP: Chelly Hernandez PA-C    Date of Admission:  7/23/2019 1730   Date of Service:  04/14/20  Patient Active Problem List   Diagnosis    COPD with asthma ([facility] [address] )    Tobacco use disorder, continuous    Compression fracture of L4 lumbar vertebra    Ventral hernia    Acute on chronic respiratory failure with hypoxia ([facility] [address] )    Schizoaffective disorder, bipolar type ([facility] [address] )    Acquired hypothyroidism    Gastroesophageal reflux disease without esophagitis    Abnormal CT of the chest    Excessive cerumen in left ear canal    Lipoma of right upper extremity    Noncompliant with deep vein thrombosis (DVT) prophylaxis    At risk for aspiration    Ear ache     Diagnosis schizoaffective bipolar    Assessment   Overall Status: improving but somewhat passive aggressive   Certification Statement: The patient will continue to require additional inpatient hospital stay to assess ability to maintain improvement by attending to ADLs and taking showers regular and see how she does on outing with family and the overnight pass at the personal care home once the virus restrictions are lifted   Acceptance by patient: accepting now  Sherri Todd in recovery: living at the [facility] soon   Understanding of medications: yes     Involved in reintegration process: on hold due to [address] in relationship with psychiatrist: trusting now     Medication changes    None today    Non-pharmacological treatments   Continue with individual, group, milieu and occupational therapy using recovery principles and psycho-education about accepting illness and the need for treatment   Encourage to take showers twice a week and cooperate with treatment and adl skills as per her behav   Plan   Another therapeutic pass with sister now that she did well  with the assigned staff escort to the park but it is now on hold due to [address] [facility] for the [address] [place] [address] Rd and encouraged to accept  rather than refuse it    Safety   Safety and communication plan established to target dynamic risk factors discussed above  Discharge Plan   · back to a [facility] at [address]   Patient is still skeptical about going to the [facility] personal care [place] despite them having already accepted her and is coming up with numerous excuses claiming that she rather go to another personal care home which obviously she cannot afford as it is private pay  She also does not wish to go to the [facility] in  Her hygiene has also decreased when though she attempts to take showers twice a week and grooming is also poor  Patient is becoming passive-aggressive by not attending most of the groups and staying on her bed and coming out for medications and for meals  She states she is depressed when though she does not come across as depressed clinically and again refusing to increase the Zoloft that was recommended  She is still suspicious as to the motives of the staff and examines her medications physically before taking them and still suspects some of the staff maybe tampering with her medications as well as her inhaler and the oxygen concentrator  She does not admit to any overt delusional believes but her actions and demeanor indicate that she must be harboring some underlying paranoia and somatic beliefs  She is compliant with medications  She is preferring to stay back on her bed not always taking showers despite reminding her to take showers twice a week as per her behavior  She continues to need lot of reassurance and verbal support because of her underlying psychosomatic symptoms which are ongoing      Sleep:  Fair  Appetite:  Padmini Pitt picks and chooses some of her food but no significant weight loss noted  Side effects:  None  Review of systems: Review of systems unremarkable except for her psychosomatic complaints about fear of dying from corona virus, from heart attack, from shortness of breath and from choking on food or having a panic attack etc       Mental Status Exam  Appearance: age appropriate and older than stated age, wearing a mask,  poorly groomed with uncombed long hair sitting in the dining [place], somewhat anxious appearing not very well groomed or kept   Behavior: evasive and guarded superficially friendly pleasant anxious and suspicious at times   Speech: delayed, increased latency of response and tangential   Mood: anxious, euphoric and irritable,    Affect: increased in intensity, increased in range, mood-congruent and redirectable  Thought Process: circumstantial, concrete, goal directed, illogical and perserverative tendency to have stilted speech  Thought Content: delusions  grandiose and persecutory no overt delusions elicited but appears suspicious of staff tampering with her medications inhalant etc   Still psychosomatic about fear of death from corona virus heart attack etc needing frequent support and reassurance  Continues to believe that she has earwax in her left ear  No current suicidal homicidal thoughts intent or plans reported  No phobias obsessions or compulsions otherwise  Donald Morales   Perceptual Disturbances: None   Risk Potential: Inability to care for herself and refusal to take showers regularly  Sensorium: person, place, time/date, situation, day of week, month of year, year and time  Cognition: grossly intact aware of current events like the corona virus, no deficit in language, no deficit in recent or remote memory  Consciousness: alert and awake   Attention: Fair  Intellect: Average  Insight: Limited  Judgment: fair   Motor Activity: no abnormal movements    Vitals  Temp:  [97 3 °F (36 3 °C)-98 3 °F (36 8 °C)] 98 3 °F (36 8 °C)  HR:  [73-83] 83  Resp:  [16-18] 18  BP: ()/(64-72) 111/72  SpO2:  [91 %] 91 %  No intake or output data in the 24 hours ending 04/14/20 0828    Lab Results: No New Labs Available For Today  Results from last 7 days   Lab Units 04/11/20  0630   WBC Thousand/uL 8 20   RBC Million/uL 5 08   HEMOGLOBIN g/dL 15 8   HEMATOCRIT % 47 7*   MCV fL 94   PLATELETS Thousands/uL 201   NEUTROS ABS Thousands/µL 4 30         Current Facility-Administered Medications:  acetaminophen 325 mg Oral Q6H PRN Zac Sierra MD   acetaminophen 650 mg Oral Q6H PRN Zac Sierra MD   acetaminophen 650 mg Oral Q8H PRN Zac Sierra MD   albuterol 2 puff Inhalation Q4H PRN Zac Sierra MD   aluminum-magnesium hydroxide-simethicone 15 mL Oral Q4H PRN Zac Sierra MD   ammonium lactate 1 application Topical BID PRN Zac Sierra MD   benzonatate 100 mg Oral TID PRN Zac Sierra MD   benztropine 1 mg Intramuscular Q8H PRN Zac Sierra MD   carbamide peroxide 5 drop Left Ear BID PRN Shruthi Hamlin MD   cloZAPine 25 mg Oral BID Zac Sierra MD   cloZAPine 50 mg Oral BID Zac Sierra MD   docusate sodium 100 mg Oral BID PRN Zac Sierra MD   EPINEPHrine PF 0 15 mg Intramuscular Once PRN Zac Sierra MD   fluticasone-vilanterol 1 puff Inhalation Daily Zac Sierra MD   ketotifen 1 drop Right Eye BID PRN Zac Sierra MD   levothyroxine 125 mcg Oral Early Morning Zac Sierra MD   magnesium hydroxide 30 mL Oral Daily PRN Zac Sierra MD   montelukast 10 mg Oral HS Zac Sierra MD   OLANZapine 5 mg Intramuscular Q8H PRN Zac Sierra MD   OLANZapine 5 mg Oral Q8H PRN Zac Sierra MD   ondansetron 4 mg Oral Q6H PRN Zac Sierra MD   pantoprazole 40 mg Oral Early Morning Zac Sierra MD   polyethylene glycol 17 g Oral Daily PRN Zac Sierra MD   polyvinyl alcohol 1 drop Both Eyes Q3H PRN Zac Sierra MD   sertraline 175 mg Oral Daily Zac Sierra MD   sucralfate 1,000 mg Oral BID Yani Toribio MD   theophylline 200 mg Oral Daily Zac Sierra MD   tiotropium 18 mcg Inhalation Daily Zac Sierra MD   traZODone 25 mg Oral HS PRN Zac Sierra MD Counseling / Coordination of Care: Total floor / unit time spent today 15 minutes  Greater than 50% of total time was spent with the patient and / or family counseling and / or somewhat receptive to supportive listening and teaching positive coping skills to deal with symptom mangement  Patient's Rights, confidentiality and exceptions to confidentiality, use of automated medical record, [facility] staff access to medical record, and consent to treatment reviewed

## 2020-04-14 NOTE — PLAN OF CARE
Problem: Alteration in Thoughts and Perception  Goal: Agree to be compliant with medication regime, as prescribed and report medication side effects  Description  Interventions:  - Offer appropriate PRN medication and supervise ingestion; conduct aims, as needed   Outcome: Progressing     Problem: Alteration in Thoughts and Perception  Goal: Attend and participate in unit activities, including therapeutic, recreational, and educational groups  Description  Interventions:  - Provide therapeutic and educational activities daily, encourage attendance and participation, and document same in the medical record     CERTIFIED PEER SPECIALIST INTERVENTIONS:    Complete peer assessment with patient to assess their needs and identify their goals to complete while in the recovery program as well as once discharged into the community  Patient will complete WRAP Plan, Crisis Plan and [facility]  Patient will attend 50% of groups offered on the unit  Patient will complete a goal card weekly  Outcome: Not Progressing  Goal: Complete daily ADLs, including personal hygiene independently, as able  Description  Interventions:  - Observe, teach, and assist patient with ADLS  - Monitor and promote a balance of rest/activity, with adequate nutrition and elimination   Outcome: Not Progressing     Problem: Depression  Goal: Refrain from isolation  Description  Interventions:  - Develop a trusting relationship   - Encourage socialization   Outcome: Not Progressing     Problem: Nutrition/Hydration-ADULT  Goal: Nutrient/Hydration intake appropriate for improving, restoring or maintaining nutritional needs  Description  Monitor and assess patient's nutrition/hydration status for malnutrition  Collaborate with interdisciplinary team and initiate plan and interventions as ordered  Monitor patient's weight and dietary intake as ordered or per policy  Utilize nutrition screening tool and intervene as necessary   Determine patient's food preferences and provide high-protein, high-caloric foods as appropriate  INTERVENTIONS:  - Monitor oral intake, urinary output, labs, and treatment plans  - Assess nutrition and hydration status and recommend course of action  - Evaluate amount of meals eaten  - Assist patient with eating if necessary   - Allow adequate time for meals  - Recommend/ encourage appropriate diets, oral nutritional supplements, and vitamin/mineral supplements  - Order, calculate, and assess calorie counts as needed  - Recommend, monitor, and adjust tube feedings and TPN/PPN based on assessed needs  - Assess need for intravenous fluids  - Provide specific nutrition/hydration education as appropriate  - Include patient/family/caregiver in decisions related to nutrition  Outcome: Not Progressing     2015 Claudy Finch is disheveled, unkempt, low energy; lays crumpled in bed asleep in free time  Did come out for meal (ate poorly 25% plus an Ensure)  Needed prompting to come for supper medicine  She is pleasant, but, some withdrawn  Did not respond to invitation to optional [employer] or for PM Group

## 2020-04-14 NOTE — TREATMENT TEAM
Patient declined      04/14/20 1400   Activity/Group Checklist   Group   (Recovery Workshop )   Attendance Did not attend   Attendance Duration (min) 46-60   Affect/Mood IRMA

## 2020-04-14 NOTE — PLAN OF CARE
Problem: Individualized Interventions  Goal: Attend and participate in unit activities, including therapeutic, recreational, and educational groups  Description    PSYCHOTHERAPY INTERVENTIONS:    -Form therapeutic alliance to promote trust and safety within a therapeutic environment    -Engage in individual psychotherapy using evidence-based practices that are specific to individual needs   -Process barriers to community living with an emphasis on solution-based interventions   -Promote self-awareness and identity development   -Identify and process patterns of behavior that have led to decompensation and/or hospitalizations   -Attend psychoeducation groups with licensed psychotherapist to learn about Illness Management Recovery (IMR) concepts and enhance coping skills    -Practice effective communication with staff, peers, family, and community members  Participate in family sessions as necessary   -Encourage reality-based thought content by examining thought processes and cognitive distortions      -Work with treatment team in reintegration back into the community when appropriate  Outcome: Progressing    Therapist and patient met for follow-up session after treatment team meeting where she presented as depressed  Patient reports that she is depressed because she "can't go out," she is depressed "no one is being discharged," and she is depressed that her son is deployed  Therapist agreed that these were all reasons to be sad, and offered that she may be feeling extra anxiety because of the virus and staff/patients being more vigilant than normal   Therapist again educated patient on panic attacks, and patient fears she cannot go to [facility] without having another panic attack  Therapist explained the arbitrary nature of panic attacks, and that just because she had one in IMR, does not mean she will have another  However, her hesitance is understandable given the experience of a panic attack    Patient was supported and encouraged to work on confronting some of the fears she has been carrying with her for so long  Patient was receptive to these considerations, and was encouraged to seek out this therapist for support and assurance  Therapist will continue to help patient problem-solve/eliminate the various barriers to recovery that she self-perpetuates

## 2020-04-15 NOTE — PROGRESS NOTES
Psychiatry Progress Note [address] 60 y o  female MRN: [id number]  [facility] [address] Encounter: [id number]  Code Status: Level 1 - Full Code    PCP: Bonnee Favre, PA-C    Date of Admission:  7/23/2019 1730   Date of Service:  04/15/20  Patient Active Problem List   Diagnosis    COPD with asthma ([facility] )    Tobacco use disorder, continuous    Compression fracture of L4 lumbar vertebra    Ventral hernia    Acute on chronic respiratory failure with hypoxia ([facility] )    [facility], [facility]    Acquired hypothyroidism    Gastroesophageal reflux disease without esophagitis    Abnormal CT of the chest    Excessive cerumen in left ear canal    Lipoma of right upper extremity    Noncompliant with deep vein thrombosis (DVT) prophylaxis    At risk for aspiration    Ear ache     Diagnosis schizoaffective bipolar    Assessment   Overall Status: improving but somewhat passive aggressive   Certification Statement: The patient will continue to require additional inpatient hospital stay to assess ability to maintain improvement by attending to ADLs and taking showers regular and see how she does on outing with family and the overnight pass at the personal care home once the virus restrictions are lifted   Acceptance by patient: accepting now  Ricardo Chavarria in recovery: living at the [address] [place] [address] Rd soon   Understanding of medications: yes     Involved in reintegration process: on hold due to [address] in relationship with psychiatrist: trusting now     Medication changes    None today    Non-pharmacological treatments   Continue with individual, group, milieu and occupational therapy using recovery principles and psycho-education about accepting illness and the need for treatment   Encourage to take showers twice a week and cooperate with treatment and adl skills as per her behav   Plan   Another therapeutic pass with sister now that she did well  with the assigned staff escort to the park but it is now on hold due to [address] [facility] for the [address] [place] [address] Rd and encouraged to accept  rather than refuse it    Safety   Safety and communication plan established to target dynamic risk factors discussed above  Discharge Plan   · back to a [facility]    Interval Progress    patient was refusing to attend any groups yesterday and was found laying on bed most of the time and has been picking on her males but has not lost any significant weight  She is to debating whether she should go and is coming out with numerous excuses as to why she should not go to the [facility] personal care home where she was already accepted  She is still coming up with several reasons why she should go to a private personal care home even though she cannot afford it  She is becoming passive-aggressive but not attending groups and only coming out for medications and for meals  She continues to have psychosomatic believes that she is going to die from a heart attack or choking on food or being short of breath etc   She does not report any overt delusional believes even though she her actions and demeanor indicate that she must be still harboring some paranoia and somatic believes  She is compliant with medications  Hygiene is still poor and limited       Sleep:   Fair  Appetite:   Fair but she picks and chooses her food  Side effects:   none  Review of systems:  Continues to have psychosomatic symptoms as before       Mental Status Exam  Appearance:  Appears older than his stated age, poorly groomed poorly kept but wearing clean clothes found laying on bed but readily start up when approached    Anxious preoccupied  Behavior:  Superficially friendly pleasant but anxious suspicious  Speech:  Delayed with increased latency of production and tangential at timesl   Mood:  Euphoric anxious irritated,    Affect: increased in intensity range mood congruent redirectable  Thought Process:  Circumstantial with tendency to have stilted speech logical pursue rate to  Thought Content:  Continues to have some paranoia about motives of staff switching her medications or tampering with her inhalant or her oxygen concentrator off and on  She also has to examine her pills literally before taking them  No preoccupation with violence or suicide  No current suicidal homicidal thoughts intent or plans reported  No phobias but has obsessions and compulsions about taking her pills    Perceptual Disturbances:  None reported   Risk Potential:  Ability to care for herself and refusal to take showers  Sensorium: fully oriented to all 3 spheres and to situation  Cognition:  Grossly intact, aware of current events like the corona virus situation, recent and remote memory intact    No language deficit  Consciousness:  Alert and awake  Attention:  fair  Intellect:  average  Insight:  limited  Judgment:  fair  Motor Activity:  abnormal involuntary movement    Vitals  Temp:  [97 4 °F (36 3 °C)] 97 4 °F (36 3 °C)  HR:  [79-96] 79  Resp:  [15-18] 18  BP: ()/(54-63) 107/63  SpO2:  [91 %-93 %] 93 %  No intake or output data in the 24 hours ending 04/15/20 1045    Lab Results: No New Labs Available For Today  Results from last 7 days   Lab Units 04/11/20  0630   WBC Thousand/uL 8 20   RBC Million/uL 5 08   HEMOGLOBIN g/dL 15 8   HEMATOCRIT % 47 7*   MCV fL 94   PLATELETS Thousands/uL 201   NEUTROS ABS Thousands/µL 4 30         Current Facility-Administered Medications:  acetaminophen 325 mg Oral Q6H PRN Jerome Lopez MD   acetaminophen 650 mg Oral Q6H PRN Jerome Lopez MD   acetaminophen 650 mg Oral Q8H PRN Jerome Lopez MD   albuterol 2 puff Inhalation Q4H PRN Jerome Lopez MD   aluminum-magnesium hydroxide-simethicone 15 mL Oral Q4H PRN Jerome Lopez MD   ammonium lactate 1 application Topical BID PRN Jerome Lopez MD   benzonatate 100 mg Oral TID PRN Jerome Lopez MD   benztropine 1 mg Intramuscular Q8H PRN Gwen Ramirez Robinson Stein MD   carbamide peroxide 5 drop Left Ear BID PRN Ada LineMD coleen   cloZAPine 25 mg Oral BID Deedee Muir MD   cloZAPine 50 mg Oral BID Deedee Muir MD   docusate sodium 100 mg Oral BID PRN Deedee Muir MD   EPINEPHrine PF 0 15 mg Intramuscular Once PRN Deedee Muir MD   fluticasone-vilanterol 1 puff Inhalation Daily Deedee Muir MD   ketotifen 1 drop Right Eye BID PRN Deedee Muir MD   levothyroxine 125 mcg Oral Early Morning Deedee Muir MD   magnesium hydroxide 30 mL Oral Daily PRN Deedee Muir MD   montelukast 10 mg Oral HS Deedee Muir MD   OLANZapine 5 mg Intramuscular Q8H PRBJORN Muir MD   OLANZapine 5 mg Oral Q8H PRN Deedee Muir MD   ondansetron 4 mg Oral Q6H PRN Deedee Muir MD   pantoprazole 40 mg Oral Early Morning Deedee Muir MD   polyethylene glycol 17 g Oral Daily PRN Deedee Muir MD   polyvinyl alcohol 1 drop Both Eyes Q3H PRN Deedee Muir MD   sertraline 175 mg Oral Daily Deedee Muir MD   sucralfate 1,000 mg Oral BID Juan Carlos Merchant MD   theophylline 200 mg Oral Daily Deedee Muir MD   tiotropium 18 mcg Inhalation Daily Deedee Muir MD   traZODone 25 mg Oral HS PRN Deedee Muir MD       Counseling / Coordination of Care: Total floor / unit time spent today 15 minutes  Greater than 50% of total time was spent with the patient and / or family counseling and / or somewhat receptive to supportive listening and teaching positive coping skills to deal with symptom mangement  Patient's Rights, confidentiality and exceptions to confidentiality, use of automated medical record, Jeb Patrick staff access to medical record, and consent to treatment reviewed

## 2020-04-15 NOTE — TREATMENT TEAM
04/15/20 0930   Activity/Group Checklist   Group Exercise  (Morning Walk )   Attendance Did not attend   Attendance Duration (min) 16-30   Affect/Mood IRMA

## 2020-04-15 NOTE — PROGRESS NOTES
Maggie maintained on ongoing fall and SAFE precaution  Lolis Torres in bed with eyes closed, breath even and unlabored   On O2 with humidifier @1L/m via nasal cannula   Continues rounding implemented   No somatic complaint overnight  No PRN needed for sleep aid   No indication of pain or discomfort

## 2020-04-15 NOTE — PLAN OF CARE
Problem: Alteration in Thoughts and Perception  Goal: Verbalize thoughts and feelings  Description  Interventions:  - Promote a nonjudgmental and trusting relationship with the patient through active listening and therapeutic communication  - Assess patient's level of functioning, behavior and potential for risk  - Engage patient in 1 on 1 interactions for a minimum of 15 minutes each session  - Encourage patient to express fears, feelings, frustrations, and discuss symptoms    - [place] patient to reality, help patient recognize reality-based thinking   - Administer medications as ordered and assess for potential side effects  - Provide the patient education related to the signs and symptoms of the illness and desired effects of prescribed medications  Outcome: Progressing  Goal: Agree to be compliant with medication regime, as prescribed and report medication side effects  Description  Interventions:  - Offer appropriate PRN medication and supervise ingestion; conduct aims, as needed   Outcome: Progressing     Problem: Ineffective Coping  Goal: Patient/Family verbalizes awareness of resources  Outcome: Progressing  Goal: Understands least restrictive measures  Description  Interventions:  - Utilize least restrictive behavior  Outcome: Progressing     Problem: Risk for Self Injury/Neglect  Goal: Treatment Goal: Remain safe during length of stay, learn and adopt new coping skills, and be free of self-injurious ideation, impulses and acts at the time of discharge  Outcome: Progressing  Goal: Verbalize thoughts and feelings  Description  Interventions:  - Assess and re-assess patient's lethality and potential for self-injury  - Engage patient in 1:1 interactions, daily, for a minimum of 15 minutes  - Encourage patient to express feelings, fears, frustrations, hopes  - Establish rapport/trust with patient   Outcome: Progressing  Goal: Refrain from harming self  Description  Interventions:  - Monitor patient closely, per order  - Develop a trusting relationship  - Supervise medication ingestion, monitor effects and side effects   Outcome: Progressing     Problem: Depression  Goal: Verbalize thoughts and feelings  Description  Interventions:  - Assess and re-assess patient's level of risk   - Engage patient in 1:1 interactions, daily, for a minimum of 15 minutes   - Encourage patient to express feelings, fears, frustrations, hopes   Outcome: Progressing     Problem: Anxiety  Goal: Anxiety is at manageable level  Description  Interventions:  - Assess and monitor patient's anxiety level  - Monitor for signs and symptoms of anxiety both physical and emotional (heart palpitations, chest pain, shortness of breath, headaches, nausea, feeling jumpy, restlessness, irritable, apprehensive)  - Collaborate with interdisciplinary team and initiate plan and interventions as ordered    - [place] patient to unit/surroundings  - Explain treatment plan  - Encourage participation in care  - Encourage verbalization of concerns/fears  - Identify coping mechanisms  - Assist in developing anxiety-reducing skills  - Administer/offer alternative therapies  - Limit or eliminate stimulants  Outcome: Progressing     Problem: Alteration in Orientation  Goal: Interact with staff daily  Description  Interventions:  - Assess and re-assess patient's level of orientation  - Engage patient in 1 on 1 interactions, daily, for a minimum of 15 minutes   - Establish rapport/trust with patient   Outcome: Progressing  Goal: Cooperate with recommended testing/procedures  Description  Interventions:  - Determine need for ancillary testing  - Observe for mental status changes  - Implement falls/precaution protocol   Outcome: Progressing     Problem: PAIN - ADULT  Goal: Verbalizes/displays adequate comfort level or baseline comfort level  Description  Interventions:  - Encourage patient to monitor pain and request assistance  - Assess pain using appropriate pain scale  - Administer analgesics based on type and severity of pain and evaluate response  - Implement non-pharmacological measures as appropriate and evaluate response  - Consider cultural and social influences on pain and pain management  - Notify physician/advanced practitioner if interventions unsuccessful or patient reports new pain  Outcome: Progressing     Problem: SAFETY ADULT  Goal: Patient will remain free of falls  Description  INTERVENTIONS:  - Assess patient frequently for physical needs  -  Identify cognitive and physical deficits and behaviors that affect risk of falls  -  [place] fall precautions as indicated by assessment   - Educate patient/family on patient safety including physical limitations  - Instruct patient to call for assistance with activity based on assessment  - Modify environment to reduce risk of injury  - Consider OT/PT consult to assist with strengthening/mobility  Outcome: Progressing     Problem: Alteration in Thoughts and Perception  Goal: Treatment Goal: Gain control of psychotic behaviors/thinking, reduce/eliminate presenting symptoms and demonstrate improved reality functioning upon discharge  Outcome: Not Progressing  Goal: Attend and participate in unit activities, including therapeutic, recreational, and educational groups  Description  Interventions:  - Provide therapeutic and educational activities daily, encourage attendance and participation, and document same in the medical record     CERTIFIED PEER SPECIALIST INTERVENTIONS:    Complete peer assessment with patient to assess their needs and identify their goals to complete while in the recovery program as well as once discharged into the community  Patient will complete WRAP Plan, Crisis Plan and 5 Life Domains  Patient will attend 50% of groups offered on the unit  Patient will complete a goal card weekly      Outcome: Not Progressing  Goal: Recognize dysfunctional thoughts, communicate reality-based thoughts at the time of discharge  Description  Interventions:  - Provide medication and psycho-education to assist patient in compliance and developing insight into his/her illness   Outcome: Not Progressing  Goal: Complete daily ADLs, including personal hygiene independently, as able  Description  Interventions:  - Observe, teach, and assist patient with ADLS  - Monitor and promote a balance of rest/activity, with adequate nutrition and elimination   Outcome: Not Progressing     Problem: Ineffective Coping  Goal: Identifies ineffective coping skills  Outcome: Not Progressing  Goal: Identifies healthy coping skills  Outcome: Not Progressing  Goal: Demonstrates healthy coping skills  Outcome: Not Progressing  Goal: Participates in unit activities  Description  Interventions:  - Provide therapeutic environment   - Provide required programming   - Redirect inappropriate behaviors   Outcome: Not Progressing     Problem: Risk for Self Injury/Neglect  Goal: Attend and participate in unit activities, including therapeutic, recreational, and educational groups  Description  Interventions:  - Provide therapeutic and educational activities daily, encourage attendance and participation, and document same in the medical record  - Obtain collateral information, encourage visitation and family involvement in care   Outcome: Not Progressing  Goal: Recognize maladaptive responses and adopt new coping mechanisms  Outcome: Not Progressing  Goal: Complete daily ADLs, including personal hygiene independently, as able  Description  Interventions:  - Observe, teach, and assist patient with ADLS  - Monitor and promote a balance of rest/activity, with adequate nutrition and elimination  Outcome: Not Progressing     Problem: Depression  Goal: Treatment Goal: Demonstrate behavioral control of depressive symptoms, verbalize feelings of improved mood/affect, and adopt new coping skills prior to discharge  Outcome: Not Progressing  Goal: Refrain from isolation  Description  Interventions:  - Develop a trusting relationship   - Encourage socialization   Outcome: Not Progressing  Goal: Refrain from self-neglect  Outcome: Not Progressing     Problem: Nutrition/Hydration-ADULT  Goal: Nutrient/Hydration intake appropriate for improving, restoring or maintaining nutritional needs  Description  Monitor and assess patient's nutrition/hydration status for malnutrition  Collaborate with interdisciplinary team and initiate plan and interventions as ordered  Monitor patient's weight and dietary intake as ordered or per policy  Utilize nutrition screening tool and intervene as necessary  Determine patient's food preferences and provide high-protein, high-caloric foods as appropriate  INTERVENTIONS:  - Monitor oral intake, urinary output, labs, and treatment plans  - Assess nutrition and hydration status and recommend course of action  - Evaluate amount of meals eaten  - Assist patient with eating if necessary   - Allow adequate time for meals  - Recommend/ encourage appropriate diets, oral nutritional supplements, and vitamin/mineral supplements  - Order, calculate, and assess calorie counts as needed  - Recommend, monitor, and adjust tube feedings and TPN/PPN based on assessed needs  - Assess need for intravenous fluids  - Provide specific nutrition/hydration education as appropriate  - Include patient/family/caregiver in decisions related to nutrition  Outcome: Not Progressing  Continues to be isolative to her bed in her room with the exception of coming out for meds and meals  Continues to not attend any groups  Minimally interacted with others  Ate 25% of both meals  Remains fixated on her physical status, and wearing a mask  Encouraged patient to drink more water, be more active and participate in groups/activities  No other behaviors or issues noted  Continue to monitor

## 2020-04-15 NOTE — PROGRESS NOTES
04/15/20 0900   Team Meeting   Meeting Type Daily Rounds   Team Members Present   Team Members Present Physician;Nurse; Other (Discipline and Name)   Physician Team Member Dr Cesario Patterson Team Member Jenniffer Bustamante RN   Other (Discipline and Name) JER SwartzW; Jasiel Adame, SHANIKA     Patient has been isolative and preoccupied with her mask  Not participating in programming

## 2020-04-15 NOTE — PLAN OF CARE
Problem: Alteration in Thoughts and Perception  Goal: Attend and participate in unit activities, including therapeutic, recreational, and educational groups  Description  Interventions:  - Provide therapeutic and educational activities daily, encourage attendance and participation, and document same in the medical record     CERTIFIED PEER SPECIALIST INTERVENTIONS:    Complete peer assessment with patient to assess their needs and identify their goals to complete while in the recovery program as well as once discharged into the community  Patient will complete WRAP Plan, Crisis Plan and 5 Life Domains  Patient will attend 50% of groups offered on the unit  Patient will complete a goal card weekly  Outcome: Not Progressing     Problem: Ineffective Coping  Goal: Identifies ineffective coping skills  Outcome: Not Progressing  Goal: Identifies healthy coping skills  Outcome: Not Progressing  Goal: Demonstrates healthy coping skills  Outcome: Not Progressing   Patient continues to isolate and stay in bed except for meals and snacks  Patient disclosed to this writer she is very depressed as she was tearful when speaking of this  Writer encouraged her to come to some groups and participate in unit activities and explained to Patient not being active will elevate her depression  Writer offered Patient face to face meetings to talk and work on her [employer], Life Domains and [facility] Relapse Prevention plan, without prevail  Writer continues this encouraging process and attempts to support without any positive response from Patient

## 2020-04-15 NOTE — TREATMENT TEAM
Patient declined      04/15/20 1100   Activity/Group Checklist   Group   (IMR/Social Icebreakers )   Attendance Did not attend   Attendance Duration (min) 46-60   Affect/Mood IRMA

## 2020-04-16 NOTE — TREATMENT TEAM
04/16/20 0900   Activity/Group Checklist   Group Exercise  (Morning Walk )   Attendance Did not attend   Attendance Duration (min) 16-30   Affect/Mood IRMA

## 2020-04-16 NOTE — PLAN OF CARE
Problem: Alteration in Thoughts and Perception  Goal: Verbalize thoughts and feelings  Description  Interventions:  - Promote a nonjudgmental and trusting relationship with the patient through active listening and therapeutic communication  - Assess patient's level of functioning, behavior and potential for risk  - Engage patient in 1 on 1 interactions for a minimum of 15 minutes each session  - Encourage patient to express fears, feelings, frustrations, and discuss symptoms    - [place] patient to reality, help patient recognize reality-based thinking   - Administer medications as ordered and assess for potential side effects  - Provide the patient education related to the signs and symptoms of the illness and desired effects of prescribed medications  Outcome: Progressing  Goal: Agree to be compliant with medication regime, as prescribed and report medication side effects  Description  Interventions:  - Offer appropriate PRN medication and supervise ingestion; conduct aims, as needed   Outcome: Progressing     Problem: Risk for Self Injury/Neglect  Goal: Treatment Goal: Remain safe during length of stay, learn and adopt new coping skills, and be free of self-injurious ideation, impulses and acts at the time of discharge  Outcome: Progressing  Goal: Verbalize thoughts and feelings  Description  Interventions:  - Assess and re-assess patient's lethality and potential for self-injury  - Engage patient in 1:1 interactions, daily, for a minimum of 15 minutes  - Encourage patient to express feelings, fears, frustrations, hopes  - Establish rapport/trust with patient   Outcome: Progressing  Goal: Refrain from harming self  Description  Interventions:  - Monitor patient closely, per order  - Develop a trusting relationship  - Supervise medication ingestion, monitor effects and side effects   Outcome: Progressing     Problem: Depression  Goal: Verbalize thoughts and feelings  Description  Interventions:  - Assess and re-assess patient's level of risk   - Engage patient in 1:1 interactions, daily, for a minimum of 15 minutes   - Encourage patient to express feelings, fears, frustrations, hopes   Outcome: Progressing     Problem: Alteration in Orientation  Goal: Interact with staff daily  Description  Interventions:  - Assess and re-assess patient's level of orientation  - Engage patient in 1 on 1 interactions, daily, for a minimum of 15 minutes   - Establish rapport/trust with patient   Outcome: Progressing     Problem: PAIN - ADULT  Goal: Verbalizes/displays adequate comfort level or baseline comfort level  Description  Interventions:  - Encourage patient to monitor pain and request assistance  - Assess pain using appropriate pain scale  - Administer analgesics based on type and severity of pain and evaluate response  - Implement non-pharmacological measures as appropriate and evaluate response  - Consider cultural and social influences on pain and pain management  - Notify physician/advanced practitioner if interventions unsuccessful or patient reports new pain  Outcome: Progressing     Problem: SAFETY ADULT  Goal: Patient will remain free of falls  Description  INTERVENTIONS:  - Assess patient frequently for physical needs  -  Identify cognitive and physical deficits and behaviors that affect risk of falls    -  [place] fall precautions as indicated by assessment   - Educate patient/family on patient safety including physical limitations  - Instruct patient to call for assistance with activity based on assessment  - Modify environment to reduce risk of injury  - Consider OT/PT consult to assist with strengthening/mobility  Outcome: Progressing     Problem: Alteration in Thoughts and Perception  Goal: Treatment Goal: Gain control of psychotic behaviors/thinking, reduce/eliminate presenting symptoms and demonstrate improved reality functioning upon discharge  Outcome: Not Progressing  Goal: Attend and participate in unit activities, including therapeutic, recreational, and educational groups  Description  Interventions:  - Provide therapeutic and educational activities daily, encourage attendance and participation, and document same in the medical record     CERTIFIED PEER SPECIALIST INTERVENTIONS:    Complete peer assessment with patient to assess their needs and identify their goals to complete while in the recovery program as well as once discharged into the community  Patient will complete WRAP Plan, Crisis Plan and 5 Life Domains  Patient will attend 50% of groups offered on the unit  Patient will complete a goal card weekly      Outcome: Not Progressing  Goal: Recognize dysfunctional thoughts, communicate reality-based thoughts at the time of discharge  Description  Interventions:  - Provide medication and psycho-education to assist patient in compliance and developing insight into his/her illness   Outcome: Not Progressing  Goal: Complete daily ADLs, including personal hygiene independently, as able  Description  Interventions:  - Observe, teach, and assist patient with ADLS  - Monitor and promote a balance of rest/activity, with adequate nutrition and elimination   Outcome: Not Progressing     Problem: Ineffective Coping  Goal: Participates in unit activities  Description  Interventions:  - Provide therapeutic environment   - Provide required programming   - Redirect inappropriate behaviors   Outcome: Not Progressing     Problem: Risk for Self Injury/Neglect  Goal: Attend and participate in unit activities, including therapeutic, recreational, and educational groups  Description  Interventions:  - Provide therapeutic and educational activities daily, encourage attendance and participation, and document same in the medical record  - Obtain collateral information, encourage visitation and family involvement in care   Outcome: Not Progressing  Goal: Complete daily ADLs, including personal hygiene independently, as able  Description  Interventions:  - Observe, teach, and assist patient with ADLS  - Monitor and promote a balance of rest/activity, with adequate nutrition and elimination  Outcome: Not Progressing     Problem: Depression  Goal: Treatment Goal: Demonstrate behavioral control of depressive symptoms, verbalize feelings of improved mood/affect, and adopt new coping skills prior to discharge  Outcome: Not Progressing  Goal: Refrain from isolation  Description  Interventions:  - Develop a trusting relationship   - Encourage socialization   Outcome: Not Progressing  Goal: Refrain from self-neglect  Outcome: Not Progressing     Problem: Anxiety  Goal: Anxiety is at manageable level  Description  Interventions:  - Assess and monitor patient's anxiety level  - Monitor for signs and symptoms of anxiety both physical and emotional (heart palpitations, chest pain, shortness of breath, headaches, nausea, feeling jumpy, restlessness, irritable, apprehensive)  - Collaborate with interdisciplinary team and initiate plan and interventions as ordered  - [place] patient to unit/surroundings  - Explain treatment plan  - Encourage participation in care  - Encourage verbalization of concerns/fears  - Identify coping mechanisms  - Assist in developing anxiety-reducing skills  - Administer/offer alternative therapies  - Limit or eliminate stimulants  Outcome: Not Progressing     Problem: Nutrition/Hydration-ADULT  Goal: Nutrient/Hydration intake appropriate for improving, restoring or maintaining nutritional needs  Description  Monitor and assess patient's nutrition/hydration status for malnutrition  Collaborate with interdisciplinary team and initiate plan and interventions as ordered  Monitor patient's weight and dietary intake as ordered or per policy  Utilize nutrition screening tool and intervene as necessary  Determine patient's food preferences and provide high-protein, high-caloric foods as appropriate       INTERVENTIONS:  - Monitor oral intake, urinary output, labs, and treatment plans  - Assess nutrition and hydration status and recommend course of action  - Evaluate amount of meals eaten  - Assist patient with eating if necessary   - Allow adequate time for meals  - Recommend/ encourage appropriate diets, oral nutritional supplements, and vitamin/mineral supplements  - Order, calculate, and assess calorie counts as needed  - Recommend, monitor, and adjust tube feedings and TPN/PPN based on assessed needs  - Assess need for intravenous fluids  - Provide specific nutrition/hydration education as appropriate  - Include patient/family/caregiver in decisions related to nutrition  Outcome: Not Progressing   Continues to be isolative to her bed in her room with the exception of coming out for morning meds and breakfast which she only ate 10% of  Did not attend any groups and refused lunch  Remains fixated on her physical status, and wearing a mask  Has not showered or done hygiene since 4/10  When addressed this with her, patient offered many excuses why she could not shower  No other behaviors or issues noted  Continue to monitor

## 2020-04-16 NOTE — PROGRESS NOTES
04/16/20 0900   Team Meeting   Meeting Type Daily Rounds   Team Members Present   Team Members Present Physician;Nurse;; Other (Discipline and Name)   Physician Team Member Estee Neil, [facility] Team Member Javon Cuellar, LISA   Care Management Team Member Cheri Gilford, MSW   Other (Discipline and Name) Princess Nayak, LCSW; Naomi Lucio, SHANIKA     Ate 2//Ensure  Did not attend groups  Somatic  Reports feeling like she was having a heart attack  Wanted a "real" EKG  Wants "full cardiac workup," but ok to talk on the phone

## 2020-04-16 NOTE — PLAN OF CARE
Problem: Alteration in Thoughts and Perception  Goal: Verbalize thoughts and feelings  Description  Interventions:  - Promote a nonjudgmental and trusting relationship with the patient through active listening and therapeutic communication  - Assess patient's level of functioning, behavior and potential for risk  - Engage patient in 1 on 1 interactions for a minimum of 15 minutes each session  - Encourage patient to express fears, feelings, frustrations, and discuss symptoms    - [place] patient to reality, help patient recognize reality-based thinking   - Administer medications as ordered and assess for potential side effects  - Provide the patient education related to the signs and symptoms of the illness and desired effects of prescribed medications  Outcome: Progressing  Goal: Agree to be compliant with medication regime, as prescribed and report medication side effects  Description  Interventions:  - Offer appropriate PRN medication and supervise ingestion; conduct aims, as needed   Outcome: Progressing     Problem: Alteration in Thoughts and Perception  Goal: Attend and participate in unit activities, including therapeutic, recreational, and educational groups  Description  Interventions:  - Provide therapeutic and educational activities daily, encourage attendance and participation, and document same in the medical record     CERTIFIED PEER SPECIALIST INTERVENTIONS:    Complete peer assessment with patient to assess their needs and identify their goals to complete while in the recovery program as well as once discharged into the community  Patient will complete WRAP Plan, Crisis Plan and 5 Life Domains  Patient will attend 50% of groups offered on the unit  Patient will complete a goal card weekly      Outcome: Not Progressing  Goal: Complete daily ADLs, including personal hygiene independently, as able  Description  Interventions:  - Observe, teach, and assist patient with ADLS  - Monitor and promote a balance of rest/activity, with adequate nutrition and elimination   Outcome: Not Progressing     Problem: Depression  Goal: Refrain from isolation  Description  Interventions:  - Develop a trusting relationship   - Encourage socialization   Outcome: Not Progressing     Problem: Nutrition/Hydration-ADULT  Goal: Nutrient/Hydration intake appropriate for improving, restoring or maintaining nutritional needs  Description  Monitor and assess patient's nutrition/hydration status for malnutrition  Collaborate with interdisciplinary team and initiate plan and interventions as ordered  Monitor patient's weight and dietary intake as ordered or per policy  Utilize nutrition screening tool and intervene as necessary  Determine patient's food preferences and provide high-protein, high-caloric foods as appropriate  INTERVENTIONS:  - Monitor oral intake, urinary output, labs, and treatment plans  - Assess nutrition and hydration status and recommend course of action  - Evaluate amount of meals eaten  - Assist patient with eating if necessary   - Allow adequate time for meals  - Recommend/ encourage appropriate diets, oral nutritional supplements, and vitamin/mineral supplements  - Order, calculate, and assess calorie counts as needed  - Recommend, monitor, and adjust tube feedings and TPN/PPN based on assessed needs  - Assess need for intravenous fluids  - Provide specific nutrition/hydration education as appropriate  - Include patient/family/caregiver in decisions related to nutrition  Outcome: Not [place] continues to isolate in room in bed  She did come out for supper medicine & meal  Ate only 25% (3/4 mashed potato), drank the Ensure  She is gratified w/news that a Cardiology consult has been ordered for her by her doctor  She is pleasant  Offers no somatic complaints thus far this evening  No inclination to attend to hygiene tonight (6 days since last shower)   Did not respond to invitation to Women's Group

## 2020-04-16 NOTE — TREATMENT TEAM
04/16/20 1100   Activity/Group Checklist   Group   (IMR/Coping Skills/[employer] )   Attendance Did not attend   Attendance Duration (min) 46-60   Affect/Mood IRMA

## 2020-04-16 NOTE — PROGRESS NOTES
Progress Note - Behavioral Health   Nolberto Galvin 60 y o  female MRN: [id number]  [facility] Encounter: [id number]    The patient was seen for continuing care and reviewed with treatment team   Staff reports patient remains somatic and seclusive and not attending groups  She has had a poor appetite  She had her EKG yesterday which was possible supra ventricular tachycardia and QTC was 469  She is currently lying in her bed and disheveled  She says she was doing quite well until she had a panic attack a few weeks ago which has caused a major setback for her  She says she has been upset ever since  She is preoccupied with her EKG believing that we should do a longer test because regular EKGs are not good enough  She said she has been feeling weak and tired the last few days and is also having heartburn  She feels depressed because of covid restrictions  Said she is not eating much because she does not like the food  Denies SI  Denies hallucinations  No overt delusions aside from her somatic preoccupation    Current Mental Status Evaluation:  Appearance:  disheveled   Behavior:  friendly   Mood:  anxious and depressed   Affect: mood-congruent   Speech: Increased latency of response   Thought Process: Tangential   Thought Content:  is somatically preoccupied   Perceptual Disturbances: Denies hallucinations and does not appear to be responding to internal stimuli   Risk Potential: No suicidal or homicidal ideation   Orientation:   Oriented x 3     Progress Toward Goals:  No significant change in last 24 hours  Principal Problem:    Schizoaffective disorder, bipolar type (HCC)  Active Problems:    COPD with asthma ([address] )    Acquired hypothyroidism    Gastroesophageal reflux disease without esophagitis    At risk for aspiration    Ear ache      Recommended Treatment: Continue with pharmacotherapy, group therapy, milieu therapy and occupational therapy    The patient will be maintained on the following medications:    Current Facility-Administered Medications:  acetaminophen 325 mg Oral Q6H PRN Chichi Lockett MD   acetaminophen 650 mg Oral Q6H PRN Chichi Lockett MD   acetaminophen 650 mg Oral Q8H PRN Chichi Lockett MD   albuterol 2 puff Inhalation Q4H PRN Chichi Lockett MD   aluminum-magnesium hydroxide-simethicone 15 mL Oral Q4H PRN Chichi Lockett MD   ammonium lactate 1 application Topical BID PRN Chichi Lockett MD   benzonatate 100 mg Oral TID PRN Chichi Lockett MD   benztropine 1 mg Intramuscular Q8H PRN Chichi Lockett MD   carbamide peroxide 5 drop Left Ear BID PRN Chichi Lockett MD   cloZAPine 25 mg Oral BID Chichi Lockett MD   cloZAPine 50 mg Oral BID Chichi Lockett MD   docusate sodium 100 mg Oral BID PRN Chichi Lockett MD   EPINEPHrine PF 0 15 mg Intramuscular Once PRN Chichi Lockett MD   fluticasone-vilanterol 1 puff Inhalation Daily Chichi Lockett MD   ketotifen 1 drop Right Eye BID PRN Chichi Lockett MD   levothyroxine 125 mcg Oral Early Morning Chichi Lockett MD   magnesium hydroxide 30 mL Oral Daily PRN Chichi Lockett MD   montelukast 10 mg Oral HS Chichi Lockett MD   OLANZapine 5 mg Intramuscular Q8H PRN Chichi Lockett MD   OLANZapine 5 mg Oral Q8H PRN Chichi Lockett MD   ondansetron 4 mg Oral Q6H PRN Chichi Lockett MD   pantoprazole 40 mg Oral Early Morning Chichi Lockett MD   polyethylene glycol 17 g Oral Daily PRN Chichi Lockett MD   polyvinyl alcohol 1 drop Both Eyes Q3H PRN Chichi Lockett MD   sertraline 175 mg Oral Daily Chichi Lockett MD   sucralfate 1,000 mg Oral BID Chichi Lockett MD   theophylline 200 mg Oral Daily Chichi Lockett MD   tiotropium 18 mcg Inhalation Daily Chichi Lockett MD   traZODone 25 mg Oral HS PRN Chichi Lockett MD

## 2020-04-16 NOTE — PROGRESS NOTES
PSYCHIATRY  The patient is EKG from yesterday was reviewed which shows falls 131 per minute and the cardiologist to read it has written down it as SVT  I spoke with Dr Rupert Green cardiologist and he things it might be tachycardia not a sweet tea  I asked the nurses to check vital signs which was stable right now with pulse is 78 per minute and blood pressure within normal limits  I did put in a cardiology consult and Dr Aislinn Linares  would be the consulting psychiatrist tomorrow who will be here to see her to see whether she needs Holter monitoring    In the meantime will order vital signs Q shift

## 2020-04-16 NOTE — PROGRESS NOTES
2130 Maggie did not attend PM Group  She was willing to perform her Incentive Spirometry achieving consistent volumes of 1250ml  She readily took HS medicine except the Singulair, had an HS snack  Debrox 5 gtts applied L ear @ 2128 per her request for blocked ear  Wearing now her QHS humidified nasal O2 @ 1L for bed

## 2020-04-16 NOTE — PROGRESS NOTES
Maggie maintained on ongoing fall and SAFE precaution  Fidelina Begum in bed with eyes closed, breath even and unlabored   On O2 with humidifier @1L/m via nasal cannula   Continues rounding implemented   No somatic complaint overnight  No PRN needed for sleep aid   No indication of pain or discomfort

## 2020-04-16 NOTE — PLAN OF CARE
Problem: Alteration in Thoughts and Perception  Goal: Verbalize thoughts and feelings  Description  Interventions:  - Promote a nonjudgmental and trusting relationship with the patient through active listening and therapeutic communication  - Assess patient's level of functioning, behavior and potential for risk  - Engage patient in 1 on 1 interactions for a minimum of 15 minutes each session  - Encourage patient to express fears, feelings, frustrations, and discuss symptoms    - [place] patient to reality, help patient recognize reality-based thinking   - Administer medications as ordered and assess for potential side effects  - Provide the patient education related to the signs and symptoms of the illness and desired effects of prescribed medications  Outcome: Progressing  Goal: Agree to be compliant with medication regime, as prescribed and report medication side effects  Description  Interventions:  - Offer appropriate PRN medication and supervise ingestion; conduct aims, as needed   Outcome: Progressing     Problem: Alteration in Thoughts and Perception  Goal: Complete daily ADLs, including personal hygiene independently, as able  Description  Interventions:  - Observe, teach, and assist patient with ADLS  - Monitor and promote a balance of rest/activity, with adequate nutrition and elimination   Outcome: Not Progressing     Problem: Nutrition/Hydration-ADULT  Goal: Nutrient/Hydration intake appropriate for improving, restoring or maintaining nutritional needs  Description  Monitor and assess patient's nutrition/hydration status for malnutrition  Collaborate with interdisciplinary team and initiate plan and interventions as ordered  Monitor patient's weight and dietary intake as ordered or per policy  Utilize nutrition screening tool and intervene as necessary  Determine patient's food preferences and provide high-protein, high-caloric foods as appropriate       INTERVENTIONS:  - Monitor oral intake, urinary output, labs, and treatment plans  - Assess nutrition and hydration status and recommend course of action  - Evaluate amount of meals eaten  - Assist patient with eating if necessary   - Allow adequate time for meals  - Recommend/ encourage appropriate diets, oral nutritional supplements, and vitamin/mineral supplements  - Order, calculate, and assess calorie counts as needed  - Recommend, monitor, and adjust tube feedings and TPN/PPN based on assessed needs  - Assess need for intravenous fluids  - Provide specific nutrition/hydration education as appropriate  - Include patient/family/caregiver in decisions related to nutrition  Outcome: Not Progressing     2000 Ilda White presents somatic fear that she may be having a "Heart attack" & should be having vitals monitored "D2cmgcb"  Bases this on feeling exhausted; no pain or discomfort any where  Says needs a "real EKG, not that 5minute thing today" & an "Echocardiogram"  "Why can't I have the real work up I need?" She is @ least sitting upright on her bed, has come out of room to make some phone calls  Came up for supper medicine  Refused meal, but, did drink her Ensure  Becomes annoyed when it is presented to her that laying about in bed saps her energy more, makes for more fatigue, a vicious cycle  Was again encouraged to be more active, drink more water/fluids  Negates the fluids as feels this will contribute to over taxing her heart  Says unable to attend to hygiene today as simply too weak

## 2020-04-17 NOTE — PROGRESS NOTES
VS: 96 4    94   18   130/70   [email or web address] on 1L/M  Maggie maintained on ongoing fall and SAFE precaution   Laying in bed with eyes closed, breath even and unlabored   On O2 with humidifier @1L/m via nasal cannula  Continues rounding implemented   No somatic complaint overnight  No PRN needed for sleep aid   No indication of pain or discomfort  In no distress  Will continue to monitor

## 2020-04-17 NOTE — PROGRESS NOTES
Subjective:  Patient seen examined at bedside  She complains of chronic fatigue that was worsened last week  From time to time she does complain of chest pain and GERD like symptoms the usually relieved with antacids  She is not satisfied with the EKGs are performed and would like a 5 minutes EKG done instead  Otherwise she is eating without difficulty with states that people would like for her to drink more water and for her to ambulate more but is limited due to her fatigue  Objective:    Physical Exam   Constitutional: No distress  HENT:   Head: Normocephalic and atraumatic  Eyes: Conjunctivae and EOM are normal  Right eye exhibits no discharge  Left eye exhibits no discharge  No scleral icterus  Neck: Normal range of motion  Neck supple  Cardiovascular: Normal rate and regular rhythm  No murmur heard  Pulmonary/Chest: Effort normal and breath sounds normal  No respiratory distress  She has no wheezes  Abdominal: Soft  Bowel sounds are normal  She exhibits no distension  There is no tenderness  Musculoskeletal: Normal range of motion  She exhibits no edema or tenderness  Neurological: She is alert  Skin: Skin is warm and dry  No rash noted  She is not diaphoretic  No erythema  Vitals reviewed  Assessment/Plan:    Schizoaffective disorder, bipolar type:  - Management as per primary team    COPD with asthma:  Currently asymptomatic  Denies any shortness of breath or cough  Controlled with daily meds, Breo Ellipta, Singulair 10 mg daily at bedtime, Spiriva 18 mcg inhaler daily and Theophylline 200 mg daily  - continue current management  - albuterol, PRN  Acquired Hypothyroidism:  Controlled  Last TSH 02/29/2020:  2 2  - continue with levothyroxine 125 mcg daily  GERD:  Patient was worked up by Gastroenterology for hiatal hernia  She may need repair in future  - Continue Protonix 40 mg daily  - Greg Navarro MD  04/17/20  2:55 PM

## 2020-04-17 NOTE — PROGRESS NOTES
Psychiatry Progress Note [address] 60 y o  female MRN: [id number]  Unit/Bed#: [facility] 041-04 Encounter: [id number]  Code Status: Level 1 - Full Code    PCP: Kirk Mckeon PA-C    Date of Admission:  7/23/2019 1730   Date of Service:  04/17/20  Patient Active Problem List   Diagnosis    [facility] with asthma ([facility] )    Tobacco use disorder, continuous    Compression fracture of L4 lumbar vertebra    Ventral hernia    [facility]    Schizoaffective disorder, bipolar type ([facility] )    Acquired hypothyroidism    Gastroesophageal reflux disease without esophagitis    Abnormal CT of the chest    Excessive cerumen in left ear canal    Lipoma of right upper extremity    Noncompliant with deep vein thrombosis (DVT) prophylaxis    At risk for aspiration    Ear ache    Tachycardia    Chest pain    Low HDL (under 40)     Diagnosis schizoaffective bipolar    Assessment   Overall Status: improving but somewhat passive aggressive   Certification Statement: The patient will continue to require additional inpatient hospital stay to assess ability to maintain improvement by attending to ADLs and taking showers regular and see how she does on outing with family and the overnight pass at the personal care home once the virus restrictions are lifted   Acceptance by patient: accepting now  Vlad Graver in recovery: living at the [address] [place] [address] Rd soon   Understanding of medications: yes     Involved in reintegration process: on hold due to [address] in relationship with psychiatrist: trusting now     Medication changes    None today    Non-pharmacological treatments   Continue with individual, group, milieu and occupational therapy using recovery principles and psycho-education about accepting illness and the need for treatment   Encourage to take showers twice a week and cooperate with treatment and adl skills as per her behav  Plan   Another therapeutic pass with sister now that she did well  with the assigned staff escort to the park but it is now on hold due to corana virus   Prepare for the [address] [place] [address] Rd and encouraged to accept  rather than refuse it    Safety   Safety and communication plan established to target dynamic risk factors discussed above  Discharge Plan   · back to a [facility] at [address]    Is becoming more psychosomatic was demanding to get an echocardiogram because her EKG was showing some increased heart rate for which she was consulted with cardiology and this adjusted to do a Holter monitor for 48 hours     She is still coming up with several reasons why she should go to a private personal care home even though she cannot afford it  She is becoming passive-aggressive but not attending groups and only coming out for medications and for meals  She is still questioning her medications and suspects that people are tampering with her medications and inspects them personally before she takes them and also accuses staff of tampering with her oxygen concentrator as well as her inhalant off and on  She continues to have psychosomatic believes that she is going to die from a heart attack or choking on food or being short of breath etc   She does not report any overt delusional believes even though she her actions and demeanor indicate that she must be still harboring some paranoia and somatic believes  She is compliant with medications  Hygiene is still poor and limited       Sleep:   Fair  Appetite:   Fair but she picks and chooses her food  Side effects:   none  Review of systems:  Continues to have psychosomatic symptoms is good now getting worse since his EKG was showing increased heart rate of 131 per minute  Mental Status Exam  Appearance:  Appears older than his stated age, poorly groomed poorly kept  and not wearing clean clothes found laying on bed but readily start up when approached    Anxious preoccupied  Hair not comb  Behavior:  Superficially friendly pleasant but anxious suspicious  Speech:  Delayed with increased latency of production and tangential at timesl   Mood:  Euphoric anxious irritated,    Affect: increased in intensity range mood congruent redirectable  Thought Process:  Circumstantial with tendency to have stilted speech logical pursue rate to  Thought Content:  Continues to have some paranoia about motives of staff switching her medications or tampering with her inhalant or her oxygen concentrator off and on  She also has to examine her pills literally before taking them  No preoccupation with violence or suicide  No current suicidal homicidal thoughts intent or plans reported  No phobias but has obsessions and compulsions about examining her pills before she takes them     Perceptual Disturbances:  None reported   Risk Potential:  Ability to care for herself and refusal to take showers  Sensorium: fully oriented to place, person, time, date, day, month, year and to situation  Cognition:  Grossly intact, aware of current events like the corona virus situation, recent and remote memory intact    No language deficit  Consciousness:  Alert and awake  Attention:  fair  Intellect:  average  Insight:  limited  Judgment:  fair  Motor Activity:  abnormal involuntary movement    Vitals  Temp:  [96 4 °F (35 8 °C)-97 7 °F (36 5 °C)] 97 7 °F (36 5 °C)  HR:  [79-95] 82  Resp:  [14-18] 18  BP: ()/(52-70) 109/68  SpO2:  [94 %-95 %] 95 %  No intake or output data in the 24 hours ending 04/17/20 1008    Lab Results: No New Labs Available For Today  Results from last 7 days   Lab Units 04/11/20  0630   WBC Thousand/uL 8 20   RBC Million/uL 5 08   HEMOGLOBIN g/dL 15 8   HEMATOCRIT % 47 7*   MCV fL 94   PLATELETS Thousands/uL 201   NEUTROS ABS Thousands/µL 4 30         Current Facility-Administered Medications:  acetaminophen 325 mg Oral Q6H PRN Meldon Curling, MD   acetaminophen 650 mg Oral Q6H PRN Jeffrey Gallegos MD   acetaminophen 650 mg Oral Q8H PRN Jeffrey Gallegos MD   albuterol 2 puff Inhalation Q4H PRN Jeffrey Gallegos MD   aluminum-magnesium hydroxide-simethicone 15 mL Oral Q4H PRN Jeffrey Gallegos MD   ammonium lactate 1 application Topical BID PRN Jeffrey Gallegos MD   benzonatate 100 mg Oral TID PRN Jeffrey Gallegos MD   benztropine 1 mg Intramuscular Q8H PRN Jeffrey Gallegos MD   carbamide peroxide 5 drop Left Ear BID PRN Jeffrey Gallegos MD   cloZAPine 25 mg Oral BID Jeffrey Gallegos MD   cloZAPine 50 mg Oral BID Jeffrey Gallegos MD   docusate sodium 100 mg Oral BID PRN Jeffrey Gallegos MD   EPINEPHrine PF 0 15 mg Intramuscular Once PRN Jeffrey Gallegos MD   fluticasone-vilanterol 1 puff Inhalation Daily Jeffrey Gallegos MD   ketotifen 1 drop Right Eye BID PRN Jeffrey Gallegos MD   levothyroxine 125 mcg Oral Early Morning Jeffrey Gallegos MD   magnesium hydroxide 30 mL Oral Daily PRN Jeffrey Gallegos MD   montelukast 10 mg Oral HS Jeffrey Gallegos MD   OLANZapine 5 mg Intramuscular Q8H PRN Jeffrey Gallegos MD   OLANZapine 5 mg Oral Q8H PRN Jeffrey Gallegos MD   ondansetron 4 mg Oral Q6H PRN Jeffrey Gallegos MD   pantoprazole 40 mg Oral Early Morning Jeffrey Gallegos MD   polyethylene glycol 17 g Oral Daily PRN Jeffrey Gallegos MD   polyvinyl alcohol 1 drop Both Eyes Q3H PRN Jeffrey Gallegos MD   sertraline 175 mg Oral Daily Jeffrey Gallegos MD   sucralfate 1,000 mg Oral BID Jeffrey Gallegos MD   theophylline 200 mg Oral Daily Jeffrey Gallegos MD   tiotropium 18 mcg Inhalation Daily Jeffrey Gallegos MD   traZODone 25 mg Oral HS PRN Jeffrey Gallegos MD       Counseling / Coordination of Care: Total floor / unit time spent today 15 minutes  Greater than 50% of total time was spent with the patient and / or family counseling and / or somewhat receptive to supportive listening and teaching positive coping skills to deal with symptom mangement       Patient's Rights, confidentiality and exceptions to confidentiality, use of automated medical record, [facility] staff access to medical record, and consent to treatment reviewed

## 2020-04-17 NOTE — PLAN OF CARE
Problem: Alteration in Thoughts and Perception  Goal: Verbalize thoughts and feelings  Description  Interventions:  - Promote a nonjudgmental and trusting relationship with the patient through active listening and therapeutic communication  - Assess patient's level of functioning, behavior and potential for risk  - Engage patient in 1 on 1 interactions for a minimum of 15 minutes each session  - Encourage patient to express fears, feelings, frustrations, and discuss symptoms    - [place] patient to reality, help patient recognize reality-based thinking   - Administer medications as ordered and assess for potential side effects  - Provide the patient education related to the signs and symptoms of the illness and desired effects of prescribed medications  Outcome: Progressing    Therapist used a nonjudgemental/therapeutic approach to listen to patient's cardiac concerns  Therapist provided support and empathy, understanding that her concerns are compounded by the current viral pandemic  Patient was encouraged to advocate for herself, if she was not satisfied with the medical interventions thus far, remembering to stay realistic about the scope of resources available to her  Therapist will continue to provide support for patient, in addition to encouraging insight into her mental health

## 2020-04-17 NOTE — CONSULTS
Consult - Cardiology   Brandon Yang 60 y o  female MRN: [id number]  Unit/Bed#: [facility] 110-02 Encounter: [id number]          Reason For Consult:  "SVT"    History Of Present Illness:  60year old female with schizoaffective disorder and bipolar disorder    She has COPD, low T4 and GERD    She did have a HR of 131 bpm on an ECG yesterday    This was read as possible SVT and we are consulted for that reason  She denies palpitations    The ECG was taken as a routine in light of her meds and not prompted by sx    She does have multiple complaints including lightheadedness at times, fatigue, and DOMINGO  She states the DOMINGO is chronic but may be worse  She is very anxious and it is hard to discern what is new or not    She does get some vague chest tightness at times    She denies edema  Past Medical History:   COPD with ongoing smoking  Hypothyroidism  GERD  Bipolar disorder  Chronic pain disorder  Hx lumbar compression frx  Lipoma  Schizoaffective disorder  Thoracic compression frx  Ventral hernia        Allergy:  Allergies   Allergen Reactions    Peanut-Containing Drug Products Anaphylaxis    Shellfish-Derived Products Anaphylaxis    Antihistamines, Chlorpheniramine-Type     Aspirin     Atrovent [Ipratropium]     Elavil [Amitriptyline]     Iodine      Other reaction(s): Unknown Reaction    Levaquin [Levofloxacin]     Novocain [Procaine]     Penicillins      Able to tolerate azithromycin and vancomycin    Prolixin [Fluphenazine]     Sulfa Antibiotics Other (See Comments)     Unknown Zithromax-Tight Throat    Zithromax [Azithromycin] Throat Swelling     Tight throat       Medications:  cloazpine  breo ellipta  Levothyroxine  singulair  protonix  Sertraline  carafate  Theophylline  spiriva      Family History:  OA, both parents and one brother with CAD  Ana Burow Brother with premature CAD    Social History:  Smokes 1/4 ppd  Ana Burow  Heavier in past  No ETOH      ROS:  Fatigue, subpar appetite, wt however stable  No recent coughing wheezing but has chest tightness and SOB  No change of bowels, blood per stool, constipation or diarrhea  Urinary frequency without dysuria, hematuria or urgency  Does have some arthalgias and back pain      No HAs, dizziness as noted  No apparent visual or hearing problems  No rash or lesions        Exam:  109/68  82  General: pleasant anxious middle aged female  Head: Normocephalic, atraumatic  Eyes:  No Icterus  Normal Conjunctiva  Oropharynx: normal-appearing mucosa and no pharyngitis, no exudate  Neck: supple, symmetrical, trachea midline, thyroid: not enlarged, symmetric, no tenderness/mass/nodules, no carotid bruit and no JVD   Heart: RRR, No: murmer, rub or gallop,   Lungs: no rales, rhonchi or wheezing and diminished breath sounds:    Abdomen: flat, normal findings: no masses palpable, no organomegaly and soft, non-tender  Lower Limbs: no edema    Diminished distal pulses  Musculoskeletal:kyphosis w/o hand/jt abnls,   Neurologic: no focal motor or sensory findings    ECG: possible PSVT  LAD  Hb 15 8 wbc 8 2 plts 201 k  K 3 9 BUN 18 creat 0 7glu 96  TSH 2 23  Lipid panel from October in 2019 showed  HDL 38 and TGs 134    ASSESSMENT AND PLAN:   1  Tachycardia    Not sure if this is PSVT  Tyra Hubbard Certainly higher rate than she usually runs  Not symptomatic    Will order Holter 48 hours and can see if she has this intermittently throughout day with onset and offset if it occurs    Poor candidate for CCB or beta blocker due to lower BPs      2  Chest pain and SOB    Very difficult to evaluate pts sx since very anxious and diffuse    May deserve fx testing at some point to see if CAD present which is possible in light of age, FH of CAD and low HDL with modestly elevated LDL  3  Low HDL and elevated LDL    At some point would consider adding statin tatiana in light of mult risk factors      Smoking discouraged    Will follow next week after Holter done       Mariah Francis MD

## 2020-04-17 NOTE — PROGRESS NOTES
2140 Maggie did not attend PM Group  Did perform her Incentive Spirometry & did well achieving all 1250ml or slightly better volumes  Did have an HS snack of 2 yogurts  Accepted her HS medicine except the Singulair  Wearing now her QHS humidified nasal O2 @ 1L for bed   Planning on shower tomorrow, "If I'm not too tired & I have help "

## 2020-04-17 NOTE — PROGRESS NOTES
04/17/20 0900   Team Meeting   Meeting Type Daily Rounds   Team Members Present   Team Members Present Physician;Nurse;; Other (Discipline and Name)   Physician Team Member Dr Carolee Donald Team Member Tal Bingham RN   Care Management Team Member ASHLYN Sawyer   Other (Discipline and Name) Anabela Ji LCSW     Cardiac consult for abnormal EKG  Suspicious of pills this morning  No groups

## 2020-04-17 NOTE — PLAN OF CARE
Problem: Alteration in Thoughts and Perception  Goal: Verbalize thoughts and feelings  Description  Interventions:  - Promote a nonjudgmental and trusting relationship with the patient through active listening and therapeutic communication  - Assess patient's level of functioning, behavior and potential for risk  - Engage patient in 1 on 1 interactions for a minimum of 15 minutes each session  - Encourage patient to express fears, feelings, frustrations, and discuss symptoms    - [place] patient to reality, help patient recognize reality-based thinking   - Administer medications as ordered and assess for potential side effects  - Provide the patient education related to the signs and symptoms of the illness and desired effects of prescribed medications  Outcome: Progressing  Goal: Agree to be compliant with medication regime, as prescribed and report medication side effects  Description  Interventions:  - Offer appropriate PRN medication and supervise ingestion; conduct aims, as needed   Outcome: Progressing     Problem: Alteration in Thoughts and Perception  Goal: Attend and participate in unit activities, including therapeutic, recreational, and educational groups  Description  Interventions:  - Provide therapeutic and educational activities daily, encourage attendance and participation, and document same in the medical record     CERTIFIED PEER SPECIALIST INTERVENTIONS:    Complete peer assessment with patient to assess their needs and identify their goals to complete while in the recovery program as well as once discharged into the community  Patient will complete WRAP Plan, Crisis Plan and 5 Life Domains  Patient will attend 50% of groups offered on the unit  Patient will complete a goal card weekly      Outcome: Not Progressing     Problem: Depression  Goal: Refrain from isolation  Description  Interventions:  - Develop a trusting relationship   - Encourage socialization   Outcome: Not Progressing Problem: Nutrition/Hydration-ADULT  Goal: Nutrient/Hydration intake appropriate for improving, restoring or maintaining nutritional needs  Description  Monitor and assess patient's nutrition/hydration status for malnutrition  Collaborate with interdisciplinary team and initiate plan and interventions as ordered  Monitor patient's weight and dietary intake as ordered or per policy  Utilize nutrition screening tool and intervene as necessary  Determine patient's food preferences and provide high-protein, high-caloric foods as appropriate  INTERVENTIONS:  - Monitor oral intake, urinary output, labs, and treatment plans  - Assess nutrition and hydration status and recommend course of action  - Evaluate amount of meals eaten  - Assist patient with eating if necessary   - Allow adequate time for meals  - Recommend/ encourage appropriate diets, oral nutritional supplements, and vitamin/mineral supplements  - Order, calculate, and assess calorie counts as needed  - Recommend, monitor, and adjust tube feedings and TPN/PPN based on assessed needs  - Assess need for intravenous fluids  - Provide specific nutrition/hydration education as appropriate  - Include patient/family/caregiver in decisions related to nutrition  Outcome: Jannette Saurav Resendiz has been isolative to her room & bed, though, cheerful upon approach  She is being careful w/her Halter monitor to be sure it is kept intact & functioning  Came out from room to get supper medicine  Ate poorly @ meal, 25% (juice, sherbet, bites egg salad) & an Ensure  Is unkempt, but, aware cannot get the Halter wet & so content to wait to groom until it is taken off Sunday  Did not respond to invitation to attend optional movie activity

## 2020-04-17 NOTE — PROGRESS NOTES
04/17/20 1100   Activity/Group Checklist   Group Other (Comment)  ([facility] - Gratitude)   Attendance Did not attend     Patient was encouraged to attend group, but declined, staying in bed  She expresses increased anxiety related to somatic issues

## 2020-04-17 NOTE — PROGRESS NOTES
04/17/20 0900   Activity/Group Checklist   Group Community meeting  (Morning walk)   Attendance Did not attend     Patient was encouraged to join morning walk this morning, but declined  In bed

## 2020-04-17 NOTE — PLAN OF CARE
Problem: DISCHARGE PLANNING  Goal: Discharge to home or other facility with appropriate resources  Description  INTERVENTIONS:  - Conduct assessment to determine patient/family and health care team treatment goals, and need for post-acute services based on payer coverage, community resources, and patient preferences, and barriers to discharge  - Address psychosocial, clinical, and financial barriers to discharge as identified in assessment in conjunction with the patient/family and health care team  - Assist the patient in reintegration back into the community by removing barriers which may hinder a successful discharge once deemed stable  - Arrange appropriate level of post-acute services according to patient's needs and preference and payer coverage in collaboration with the physician and health care team  - Communicate with and update the patient/family, physician, and health care team regarding progress on the discharge plan  - Arrange appropriate transportation to post-acute venues    Outcome: Progressing    Patient continues to be somatic, only coming out of room for meals and snack  Patient is fixated on holter she was given by cardiologist  Patient reports that she is happy "someone is finally helping me and not just ignoring me " Patient reminded that staff is taking proper care of her and she needs to trust the staff has her best interest at heart  Patient then diverted conversation to CM "being in a relationship with county worker " Jhony Mello was ended and CM excused self from room  CM will continue to follow and provide services as needed

## 2020-04-17 NOTE — PLAN OF CARE
Problem: Alteration in Thoughts and Perception  Goal: Treatment Goal: Gain control of psychotic behaviors/thinking, reduce/eliminate presenting symptoms and demonstrate improved reality functioning upon discharge  Outcome: Not Progressing  Goal: Attend and participate in unit activities, including therapeutic, recreational, and educational groups  Description  Interventions:  - Provide therapeutic and educational activities daily, encourage attendance and participation, and document same in the medical record    Patient will complete a goal card weekly  Outcome: Not Progressing    Individual has been isolative to room, has not participated in programming  She remains somatic  Cardiology consult has been completed and she is presently wearing halter monitor for 48 hours  While speaking with the doctor she was argumentative and demanding an ecco cardiogram even after the physician discussed that this is not a test to be performed for the symptoms  Appetite remains poor  Availability of staff made known  Will continue to monitor

## 2020-04-18 NOTE — PLAN OF CARE
Problem: Alteration in Thoughts and Perception  Goal: Verbalize thoughts and feelings  Description  Interventions:  - Promote a nonjudgmental and trusting relationship with the patient through active listening and therapeutic communication  - Assess patient's level of functioning, behavior and potential for risk  - Engage patient in 1 on 1 interactions for a minimum of 15 minutes each session  - Encourage patient to express fears, feelings, frustrations, and discuss symptoms    - [place] patient to reality, help patient recognize reality-based thinking   - Administer medications as ordered and assess for potential side effects  - Provide the patient education related to the signs and symptoms of the illness and desired effects of prescribed medications  Outcome: Progressing  Goal: Attend and participate in unit activities, including therapeutic, recreational, and educational groups  Description  Interventions:  - Provide therapeutic and educational activities daily, encourage attendance and participation, and document same in the medical record     CERTIFIED PEER SPECIALIST INTERVENTIONS:    Complete peer assessment with patient to assess their needs and identify their goals to complete while in the recovery program as well as once discharged into the community  Patient will complete WRAP Plan, Crisis Plan and 5 Life Domains  Patient will attend 50% of groups offered on the unit  Patient will complete a goal card weekly      Outcome: Progressing     Problem: Alteration in Orientation  Goal: Interact with staff daily  Description  Interventions:  - Assess and re-assess patient's level of orientation  - Engage patient in 1 on 1 interactions, daily, for a minimum of 15 minutes   - Establish rapport/trust with patient   Outcome: Progressing     Problem: SAFETY ADULT  Goal: Patient will remain free of falls  Description  INTERVENTIONS:  - Assess patient frequently for physical needs  -  Identify cognitive and physical deficits and behaviors that affect risk of falls  -  [place] fall precautions as indicated by assessment   - Educate patient/family on patient safety including physical limitations  - Instruct patient to call for assistance with activity based on assessment  - Modify environment to reduce risk of injury  - Consider OT/PT consult to assist with strengthening/mobility  Outcome: Progressing     Problem: RESPIRATORY - ADULT  Goal: Achieves optimal ventilation and oxygenation  Description  INTERVENTIONS:  - Assess for changes in respiratory status  - Assess for changes in mentation and behavior  - Position to facilitate oxygenation and minimize respiratory effort  - Oxygen administration by appropriate delivery method based on oxygen saturation (per order) or ABGs  - Initiate smoking cessation education as indicated  - Encourage broncho-pulmonary hygiene including cough, deep breathe, Incentive Spirometry  - Assess the need for suctioning and aspirate as needed  - Assess and instruct to report SOB or any respiratory difficulty  - Respiratory Therapy support as indicated  Outcome: Progressing     Problem: Alteration in Thoughts and Perception  Goal: Complete daily ADLs, including personal hygiene independently, as able  Description  Interventions:  - Observe, teach, and assist patient with ADLS  - Monitor and promote a balance of rest/activity, with adequate nutrition and elimination   Outcome: Not Progressing     Problem: Depression  Goal: Refrain from isolation  Description  Interventions:  - Develop a trusting relationship   - Encourage socialization   Outcome: Not Progressing  Goal: Refrain from self-neglect  Outcome: Not Progressing     Oscar Duncan was in her room awake at the beginning of the shift  Appears more cheerful and smiling  Appearance a bit disheveled due to no shower for a week  Interacts with select peers when out of her room  Pleasant and cooperative with staff and able to make needs known   No somatic complaints  Did incentive spirometer and achieved 1250 ml's each time  Took medication without an issue  Holter monitor remains in place until tomorrow afternoon  No complaint of dizziness  Ate 25% of her meal and drank 100% of Ensure  Back to her room after supper to lay in bed  Did not attend entertainment or evening groups  Took HS medications  Ate snack  Oxygen level 93% prior to Oxygen 1 liter applied  Continue to monitor  Precautions maintained

## 2020-04-18 NOTE — PROGRESS NOTES
Progress Note - Behavioral Health   Madison Miner 60 y o  female MRN: [id number]  Unit/Bed#: [facility] 643-33 Encounter: [id number]    The patient was seen for continuing care and reviewed with treatment team   She is friendly with good eye contact on approach  She says she is less anxious now that she is being monitored for 48 hours with a Holter monitor  She remains somatically preoccupied  States her sleep is good at night but she still feels tired and would like to sleep during the day as well  She continues to refuse meals at times  Today she said they gave her the wrong food and she believes she will choke on it  Denies hallucinations  No SI     Current Mental Status Evaluation:  Appearance:  Good eye contact   Behavior:  friendly   Mood:  euthymic   Affect: broad   Speech: Normal rate and Normal volume   Thought Process:  Goal directed and coherent   Thought Content:  Paranoid and mistrustful and Somatic delusions   Perceptual Disturbances: Denies hallucinations and does not appear to be responding to internal stimuli   Risk Potential: No suicidal or homicidal ideation   Orientation:   Oriented x 3     Progress Toward Goals:  No significant changes in the last 24 hours  Principal Problem:    Schizoaffective disorder, bipolar type (HCC)  Active Problems:    COPD with asthma ([address] )    Acquired hypothyroidism    Gastroesophageal reflux disease without esophagitis    At risk for aspiration    Ear ache    Tachycardia    Chest pain    Low HDL (under 40)      Recommended Treatment: Continue with pharmacotherapy, group therapy, milieu therapy and occupational therapy    The patient will be maintained on the following medications:    Current Facility-Administered Medications:  acetaminophen 325 mg Oral Q6H PRN Alirio Frazier MD   acetaminophen 650 mg Oral Q6H PRN Alirio Frazier MD   acetaminophen 650 mg Oral Q8H PRN Alirio Frazier MD   albuterol 2 puff Inhalation Q4H PRN Alirio Frazier MD   aluminum-magnesium hydroxide-simethicone 15 mL Oral Q4H PRN Jerome Lopez MD   ammonium lactate 1 application Topical BID PRN Jerome Lopez MD   benzonatate 100 mg Oral TID PRN Jerome Lopez MD   benztropine 1 mg Intramuscular Q8H PRN Jerome Lopez MD   carbamide peroxide 5 drop Left Ear BID PRN Jerome Lopez MD   cloZAPine 25 mg Oral BID Jerome Lopez MD   cloZAPine 50 mg Oral BID Jerome Lopez MD   docusate sodium 100 mg Oral BID PRN Jerome Lopez MD   EPINEPHrine PF 0 15 mg Intramuscular Once PRN Jerome Lopez MD   fluticasone-vilanterol 1 puff Inhalation Daily Jerome Lopez MD   ketotifen 1 drop Right Eye BID PRN Jerome Lopez MD   levothyroxine 125 mcg Oral Early Morning Jerome Lopez MD   magnesium hydroxide 30 mL Oral Daily PRN Jerome Lopez MD   montelukast 10 mg Oral HS Jerome Lopez MD   OLANZapine 5 mg Intramuscular Q8H PRN Jerome Lopez MD   OLANZapine 5 mg Oral Q8H PRN Jerome Lopez MD   ondansetron 4 mg Oral Q6H PRN Jerome Lopez MD   pantoprazole 40 mg Oral Early Morning Jerome Lopez MD   polyethylene glycol 17 g Oral Daily PRN Jerome Lopez MD   polyvinyl alcohol 1 drop Both Eyes Q3H PRN Jerome Lopez MD   sertraline 175 mg Oral Daily Jerome Lopez MD   sucralfate 1,000 mg Oral BID Jerome Lopez MD   theophylline 200 mg Oral Daily Jerome Lopez MD   tiotropium 18 mcg Inhalation Daily Jerome Lopez MD   traZODone 25 mg Oral HS PRN Jerome Lopez MD

## 2020-04-18 NOTE — PROGRESS NOTES
Chuy Hahn slept throughout the shift  Respirations easy and non labored  Humidified oxygen 1 liter via nasal cannula while sleeping  Holter monitor remains in place  Ambulated to the bathroom on her own without distress  Compliant with medication  Precautions maintained

## 2020-04-18 NOTE — PROGRESS NOTES
2145 Jamelma Rosaline did perform her Incentive Spirometry achieving volumes of mostly 1250ml  She did leave room to have an HS snack of one yogurt, to take HS medicine (except the Singulair)  Pleasant conversation w/this nurse about seasonal flowers  Wearing now her QHS humidified nasal O2 @ 1L for bed

## 2020-04-18 NOTE — PLAN OF CARE
Problem: Alteration in Thoughts and Perception  Goal: Agree to be compliant with medication regime, as prescribed and report medication side effects  Description  Interventions:  - Offer appropriate PRN medication and supervise ingestion; conduct aims, as needed   Outcome: Progressing     Problem: Risk for Self Injury/Neglect  Goal: Treatment Goal: Remain safe during length of stay, learn and adopt new coping skills, and be free of self-injurious ideation, impulses and acts at the time of discharge  Outcome: Progressing  Goal: Refrain from harming self  Description  Interventions:  - Monitor patient closely, per order  - Develop a trusting relationship  - Supervise medication ingestion, monitor effects and side effects   Outcome: Progressing     Problem: Alteration in Orientation  Goal: Interact with staff daily  Description  Interventions:  - Assess and re-assess patient's level of orientation  - Engage patient in 1 on 1 interactions, daily, for a minimum of 15 minutes   - Establish rapport/trust with patient   Outcome: Progressing     Problem: PAIN - ADULT  Goal: Verbalizes/displays adequate comfort level or baseline comfort level  Description  Interventions:  - Encourage patient to monitor pain and request assistance  - Assess pain using appropriate pain scale  - Administer analgesics based on type and severity of pain and evaluate response  - Implement non-pharmacological measures as appropriate and evaluate response  - Consider cultural and social influences on pain and pain management  - Notify physician/advanced practitioner if interventions unsuccessful or patient reports new pain  Outcome: Progressing     Problem: SAFETY ADULT  Goal: Patient will remain free of falls  Description  INTERVENTIONS:  - Assess patient frequently for physical needs  -  Identify cognitive and physical deficits and behaviors that affect risk of falls    -  [place] fall precautions as indicated by assessment   - Educate patient/family on patient safety including physical limitations  - Instruct patient to call for assistance with activity based on assessment  - Modify environment to reduce risk of injury  - Consider OT/PT consult to assist with strengthening/mobility  Outcome: Progressing     Problem: Nutrition/Hydration-ADULT  Goal: Nutrient/Hydration intake appropriate for improving, restoring or maintaining nutritional needs  Description  Monitor and assess patient's nutrition/hydration status for malnutrition  Collaborate with interdisciplinary team and initiate plan and interventions as ordered  Monitor patient's weight and dietary intake as ordered or per policy  Utilize nutrition screening tool and intervene as necessary  Determine patient's food preferences and provide high-protein, high-caloric foods as appropriate       INTERVENTIONS:  - Monitor oral intake, urinary output, labs, and treatment plans  - Assess nutrition and hydration status and recommend course of action  - Evaluate amount of meals eaten  - Assist patient with eating if necessary   - Allow adequate time for meals  - Recommend/ encourage appropriate diets, oral nutritional supplements, and vitamin/mineral supplements  - Order, calculate, and assess calorie counts as needed  - Recommend, monitor, and adjust tube feedings and TPN/PPN based on assessed needs  - Assess need for intravenous fluids  - Provide specific nutrition/hydration education as appropriate  - Include patient/family/caregiver in decisions related to nutrition  Outcome: Progressing     Problem: Alteration in Thoughts and Perception  Goal: Attend and participate in unit activities, including therapeutic, recreational, and educational groups  Description  Interventions:  - Provide therapeutic and educational activities daily, encourage attendance and participation, and document same in the medical record     CERTIFIED PEER SPECIALIST INTERVENTIONS:    Complete peer assessment with patient to assess their needs and identify their goals to complete while in the recovery program as well as once discharged into the community  Patient will complete WRAP Plan, Crisis Plan and 5 Life Domains  Patient will attend 50% of groups offered on the unit  Patient will complete a goal card weekly  Outcome: Not Progressing     Problem: Ineffective Coping  Goal: Participates in unit activities  Description  Interventions:  - Provide therapeutic environment   - Provide required programming   - Redirect inappropriate behaviors   Outcome: Not Progressing     Problem: Risk for Self Injury/Neglect  Goal: Attend and participate in unit activities, including therapeutic, recreational, and educational groups  Description  Interventions:  - Provide therapeutic and educational activities daily, encourage attendance and participation, and document same in the medical record  - Obtain collateral information, encourage visitation and family involvement in care   Outcome: Not Progressing  Goal: Complete daily ADLs, including personal hygiene independently, as able  Description  Interventions:  - Observe, teach, and assist patient with ADLS  - Monitor and promote a balance of rest/activity, with adequate nutrition and elimination  Outcome: Not Progressing     Problem: Depression  Goal: Treatment Goal: Demonstrate behavioral control of depressive symptoms, verbalize feelings of improved mood/affect, and adopt new coping skills prior to discharge  Outcome: Not Progressing  Goal: Refrain from isolation  Description  Interventions:  - Develop a trusting relationship   - Encourage socialization   Outcome: Not Progressing  Goal: Refrain from self-neglect  Outcome: Not Progressing  Mostly isolative to her bed in her room with the exception of coming out for morning meds and meals  Did not attend any groups  Ate 25% of breakfast and 100% of lunch  Still refuses to shower and looks disheveled   Compliant with wearing halter heart monitor  No other behaviors or issues noted  Continue to monitor

## 2020-04-19 NOTE — PLAN OF CARE
Problem: Alteration in Thoughts and Perception  Goal: Verbalize thoughts and feelings  Description  Interventions:  - Promote a nonjudgmental and trusting relationship with the patient through active listening and therapeutic communication  - Assess patient's level of functioning, behavior and potential for risk  - Engage patient in 1 on 1 interactions for a minimum of 15 minutes each session  - Encourage patient to express fears, feelings, frustrations, and discuss symptoms    - [place] patient to reality, help patient recognize reality-based thinking   - Administer medications as ordered and assess for potential side effects  - Provide the patient education related to the signs and symptoms of the illness and desired effects of prescribed medications  Outcome: Progressing  Goal: Agree to be compliant with medication regime, as prescribed and report medication side effects  Description  Interventions:  - Offer appropriate PRN medication and supervise ingestion; conduct aims, as needed   Outcome: Progressing  Goal: Attend and participate in unit activities, including therapeutic, recreational, and educational groups  Description  Interventions:  - Provide therapeutic and educational activities daily, encourage attendance and participation, and document same in the medical record     CERTIFIED PEER SPECIALIST INTERVENTIONS:    Complete peer assessment with patient to assess their needs and identify their goals to complete while in the recovery program as well as once discharged into the community  Patient will complete WRAP Plan, Crisis Plan and 5 Life Domains  Patient will attend 50% of groups offered on the unit  Patient will complete a goal card weekly      Outcome: Progressing     Problem: Alteration in Orientation  Goal: Interact with staff daily  Description  Interventions:  - Assess and re-assess patient's level of orientation  - Engage patient in 1 on 1 interactions, daily, for a minimum of 15 minutes   - Establish rapport/trust with patient   Outcome: Progressing     Problem: SAFETY ADULT  Goal: Patient will remain free of falls  Description  INTERVENTIONS:  - Assess patient frequently for physical needs  -  Identify cognitive and physical deficits and behaviors that affect risk of falls  -  [place] fall precautions as indicated by assessment   - Educate patient/family on patient safety including physical limitations  - Instruct patient to call for assistance with activity based on assessment  - Modify environment to reduce risk of injury  - Consider OT/PT consult to assist with strengthening/mobility  Outcome: Progressing     Problem: Alteration in Thoughts and Perception  Goal: Complete daily ADLs, including personal hygiene independently, as able  Description  Interventions:  - Observe, teach, and assist patient with ADLS  - Monitor and promote a balance of rest/activity, with adequate nutrition and elimination   Outcome: Not Progressing    Kelsey Skinner was in bed, awake, at the beginning of the shift  Did her incentive spirometer without an issue  She did very well and was able to achieve 8566-5828 ml's  No somatic complaints  Social with staff  Interacts with peers when in the dining room  Took medications after being prompted  Ate only 10% of her meal, but did drink 100% of Ensure  She went to her room after eating  Did not attend Entertainment or social groups  Took HS medication  Ate snack  Oxygen saturation 91% on room air prior to oxygen (humidified) 1 liter applied via nasal cannula  Continue to monitor  Precautions maintained

## 2020-04-19 NOTE — PROGRESS NOTES
VS  97 5   [id number]   [id number]  108/[id number]  [email or web address]  Maggie maintained on ongoing fall and SAFE precaution   Laying in bed with eyes closed, breath even and unlabored   On O2 with humidifier @1L/m via nasal cannula  Continues rounding implemented   No somatic complaint overnight  No PRN needed for sleep aid   No indication of pain or discomfort  In no distress  Will continue to monitor

## 2020-04-19 NOTE — PLAN OF CARE
Problem: Alteration in Thoughts and Perception  Goal: Verbalize thoughts and feelings  Description  Interventions:  - Promote a nonjudgmental and trusting relationship with the patient through active listening and therapeutic communication  - Assess patient's level of functioning, behavior and potential for risk  - Engage patient in 1 on 1 interactions for a minimum of 15 minutes each session  - Encourage patient to express fears, feelings, frustrations, and discuss symptoms    - [place] patient to reality, help patient recognize reality-based thinking   - Administer medications as ordered and assess for potential side effects  - Provide the patient education related to the signs and symptoms of the illness and desired effects of prescribed medications  Outcome: Progressing  Goal: Agree to be compliant with medication regime, as prescribed and report medication side effects  Description  Interventions:  - Offer appropriate PRN medication and supervise ingestion; conduct aims, as needed   Outcome: Progressing     Problem: Risk for Self Injury/Neglect  Goal: Treatment Goal: Remain safe during length of stay, learn and adopt new coping skills, and be free of self-injurious ideation, impulses and acts at the time of discharge  Outcome: Progressing  Goal: Verbalize thoughts and feelings  Description  Interventions:  - Assess and re-assess patient's lethality and potential for self-injury  - Engage patient in 1:1 interactions, daily, for a minimum of 15 minutes  - Encourage patient to express feelings, fears, frustrations, hopes  - Establish rapport/trust with patient   Outcome: Progressing  Goal: Refrain from harming self  Description  Interventions:  - Monitor patient closely, per order  - Develop a trusting relationship  - Supervise medication ingestion, monitor effects and side effects   Outcome: Progressing     Problem: Depression  Goal: Treatment Goal: Demonstrate behavioral control of depressive symptoms, verbalize feelings of improved mood/affect, and adopt new coping skills prior to discharge  Outcome: Progressing  Goal: Verbalize thoughts and feelings  Description  Interventions:  - Assess and re-assess patient's level of risk   - Engage patient in 1:1 interactions, daily, for a minimum of 15 minutes   - Encourage patient to express feelings, fears, frustrations, hopes   Outcome: Progressing     Problem: Alteration in Orientation  Goal: Interact with staff daily  Description  Interventions:  - Assess and re-assess patient's level of orientation  - Engage patient in 1 on 1 interactions, daily, for a minimum of 15 minutes   - Establish rapport/trust with patient   Outcome: Progressing     Problem: PAIN - ADULT  Goal: Verbalizes/displays adequate comfort level or baseline comfort level  Description  Interventions:  - Encourage patient to monitor pain and request assistance  - Assess pain using appropriate pain scale  - Administer analgesics based on type and severity of pain and evaluate response  - Implement non-pharmacological measures as appropriate and evaluate response  - Consider cultural and social influences on pain and pain management  - Notify physician/advanced practitioner if interventions unsuccessful or patient reports new pain  Outcome: Progressing     Problem: SAFETY ADULT  Goal: Patient will remain free of falls  Description  INTERVENTIONS:  - Assess patient frequently for physical needs  -  Identify cognitive and physical deficits and behaviors that affect risk of falls    -  [place] fall precautions as indicated by assessment   - Educate patient/family on patient safety including physical limitations  - Instruct patient to call for assistance with activity based on assessment  - Modify environment to reduce risk of injury  - Consider OT/PT consult to assist with strengthening/mobility  Outcome: Progressing     Problem: Alteration in Thoughts and Perception  Goal: Treatment Goal: Gain control of psychotic behaviors/thinking, reduce/eliminate presenting symptoms and demonstrate improved reality functioning upon discharge  Outcome: Not Progressing  Goal: Attend and participate in unit activities, including therapeutic, recreational, and educational groups  Description  Interventions:  - Provide therapeutic and educational activities daily, encourage attendance and participation, and document same in the medical record     CERTIFIED PEER SPECIALIST INTERVENTIONS:    Complete peer assessment with patient to assess their needs and identify their goals to complete while in the recovery program as well as once discharged into the community  Patient will complete WRAP Plan, Crisis Plan and 5 Life Domains  Patient will attend 50% of groups offered on the unit  Patient will complete a goal card weekly      Outcome: Not Progressing  Goal: Recognize dysfunctional thoughts, communicate reality-based thoughts at the time of discharge  Description  Interventions:  - Provide medication and psycho-education to assist patient in compliance and developing insight into his/her illness   Outcome: Not Progressing  Goal: Complete daily ADLs, including personal hygiene independently, as able  Description  Interventions:  - Observe, teach, and assist patient with ADLS  - Monitor and promote a balance of rest/activity, with adequate nutrition and elimination   Outcome: Not Progressing     Problem: Ineffective Coping  Goal: Participates in unit activities  Description  Interventions:  - Provide therapeutic environment   - Provide required programming   - Redirect inappropriate behaviors   Outcome: Not Progressing     Problem: Risk for Self Injury/Neglect  Goal: Attend and participate in unit activities, including therapeutic, recreational, and educational groups  Description  Interventions:  - Provide therapeutic and educational activities daily, encourage attendance and participation, and document same in the medical record  - Obtain collateral information, encourage visitation and family involvement in care   Outcome: Not Progressing  Goal: Recognize maladaptive responses and adopt new coping mechanisms  Outcome: Not Progressing  Goal: Complete daily ADLs, including personal hygiene independently, as able  Description  Interventions:  - Observe, teach, and assist patient with ADLS  - Monitor and promote a balance of rest/activity, with adequate nutrition and elimination  Outcome: Not Progressing     Problem: Depression  Goal: Refrain from isolation  Description  Interventions:  - Develop a trusting relationship   - Encourage socialization   Outcome: Not Progressing  Goal: Refrain from self-neglect  Outcome: Not Progressing     Problem: Nutrition/Hydration-ADULT  Goal: Nutrient/Hydration intake appropriate for improving, restoring or maintaining nutritional needs  Description  Monitor and assess patient's nutrition/hydration status for malnutrition  Collaborate with interdisciplinary team and initiate plan and interventions as ordered  Monitor patient's weight and dietary intake as ordered or per policy  Utilize nutrition screening tool and intervene as necessary  Determine patient's food preferences and provide high-protein, high-caloric foods as appropriate       INTERVENTIONS:  - Monitor oral intake, urinary output, labs, and treatment plans  - Assess nutrition and hydration status and recommend course of action  - Evaluate amount of meals eaten  - Assist patient with eating if necessary   - Allow adequate time for meals  - Recommend/ encourage appropriate diets, oral nutritional supplements, and vitamin/mineral supplements  - Order, calculate, and assess calorie counts as needed  - Recommend, monitor, and adjust tube feedings and TPN/PPN based on assessed needs  - Assess need for intravenous fluids  - Provide specific nutrition/hydration education as appropriate  - Include patient/family/caregiver in decisions related to nutrition  Outcome: Not Progressing   Isolative to her bed in her room except to come out for meds and meals when prompted  Did not attend any groups and still has numerous excuses why she cannot  Remains delusional and somatically preoccupied, now believing that she has "heart problems"  Paranoid about staff and questioned why staff took her vitals on overnight shift  Still refuses to shower, looks disheveled and unkempt  Ate poorly only eating 25% of both meals  No other behaviors or issues noted  Continue to monitor

## 2020-04-19 NOTE — PROGRESS NOTES
Progress Note - Behavioral Health   Arvil Model 60 y o  female MRN: [id number]  Unit/Bed#: [facility] 994-90 Encounter: [id number]    The patient was seen for continuing care and reviewed with treatment team   She is friendly on approach  She remains somatic with numerous complaints  Her Holter monitor study will be complete later today  She is paranoid regarding medications and staff  She says she is not eating much because she is having trouble swallowing her food  Yesterday she could not swallow her mashed potatoes [facility] said it is psychosomatic but heart problems can do that too"  She also believes that she may not be getting the right medications  She has problems with a particular staff member who she said she used to like but now this nurse commands me and demeans me and has obsessions with water  She is a certain [employer] so that might be why she is like that"  Current Mental Status Evaluation:  Appearance:  Good eye contact   Behavior:  friendly   Mood:  euthymic   Affect: broad   Speech: Normal rate and Normal volume   Thought Process:  Illogical   Thought Content:  Paranoid and mistrustful and Somatic delusions   Perceptual Disturbances: Denies hallucinations and does not appear to be responding to internal stimuli   Risk Potential: No suicidal or homicidal ideation   Orientation:   Oriented x 3     Progress Toward Goals:  No significant change in the last 24 hours  Principal Problem:    Schizoaffective disorder, bipolar type (HCC)  Active Problems:    COPD with asthma ([address] )    Acquired hypothyroidism    Gastroesophageal reflux disease without esophagitis    At risk for aspiration    Ear ache    Tachycardia    Chest pain    Low HDL (under 40)      Recommended Treatment:     BMP tomorrow to monitor with decreased p o  Intake     Continue with pharmacotherapy, group therapy, milieu therapy and occupational therapy    The patient will be maintained on the following medications:    Current Facility-Administered Medications:  acetaminophen 325 mg Oral Q6H PRN Vincent Sides, MD   acetaminophen 650 mg Oral Q6H PRN [place] Sides, MD   acetaminophen 650 mg Oral Q8H PRN [place] Sides, MD   albuterol 2 puff Inhalation Q4H PRN [place] Sides, MD   aluminum-magnesium hydroxide-simethicone 15 mL Oral Q4H PRN [place] Sides, MD   ammonium lactate 1 application Topical BID PRN Vincent Sides, MD   benzonatate 100 mg Oral TID PRN [place] Sides, MD   benztropine 1 mg Intramuscular Q8H PRN Vincent Sides, MD   carbamide peroxide 5 drop Left Ear BID PRN [place] Sides, MD   cloZAPine 25 mg Oral BID Vincent Sides, MD   cloZAPine 50 mg Oral BID [place] Sides, MD   docusate sodium 100 mg Oral BID PRN Vincent Sides, MD   EPINEPHrine PF 0 15 mg Intramuscular Once PRN Vincent Sides, MD   fluticasone-vilanterol 1 puff Inhalation Daily Vincent Sides, MD   ketotifen 1 drop Right Eye BID PRN Vincent Sides, MD   levothyroxine 125 mcg Oral Early Morning [place] Sides, MD   magnesium hydroxide 30 mL Oral Daily PRN Vincent Sides, MD   montelukast 10 mg Oral HS [place] Sides, MD   OLANZapine 5 mg Intramuscular Q8H PRN Vincent Sides, MD   OLANZapine 5 mg Oral Q8H PRN [place] Sides, MD   ondansetron 4 mg Oral Q6H PRN Vincent Sides, MD   pantoprazole 40 mg Oral Early Morning [place] Sides, MD   polyethylene glycol 17 g Oral Daily PRN Vincent Sides, MD   polyvinyl alcohol 1 drop Both Eyes Q3H PRN [place] Sides, MD   sertraline 175 mg Oral Daily Vincent Sides, MD   sucralfate 1,000 mg Oral BID Vincent Sides, MD   theophylline 200 mg Oral Daily Vincent Sides, MD   tiotropium 18 mcg Inhalation Daily [place] Sides, MD   traZODone 25 mg Oral HS PRN Vincent Sides, MD

## 2020-04-20 NOTE — TREATMENT TEAM
Patient declined      04/20/20 0900   Activity/Group Checklist   Group Community meeting  (Goal Cards )   Attendance Did not attend   Attendance Duration (min) 16-30   Affect/Mood IRMA

## 2020-04-20 NOTE — PROGRESS NOTES
04/20/20 1119   Team Meeting   Meeting Type Daily Rounds   Team Members Present   Team Members Present Physician;Nurse;; Other (Discipline and Name)   Physician Team Member Dr Colton Nava Team Member Tank Posada RN   Care Management Team Member ASHLYN Mclean   Other (Discipline and Name) Maria Luz Berger, [address] [place] [address] monitor completed  Patient remains not showered from 4/10  Select groups

## 2020-04-20 NOTE — PLAN OF CARE
Problem: Alteration in Thoughts and Perception  Goal: Verbalize thoughts and feelings  Description  Interventions:  - Promote a nonjudgmental and trusting relationship with the patient through active listening and therapeutic communication  - Assess patient's level of functioning, behavior and potential for risk  - Engage patient in 1 on 1 interactions for a minimum of 15 minutes each session  - Encourage patient to express fears, feelings, frustrations, and discuss symptoms    - Tell patient to reality, help patient recognize reality-based thinking   - Administer medications as ordered and assess for potential side effects  - Provide the patient education related to the signs and symptoms of the illness and desired effects of prescribed medications  Outcome: Progressing  Goal: Agree to be compliant with medication regime, as prescribed and report medication side effects  Description  Interventions:  - Offer appropriate PRN medication and supervise ingestion; conduct aims, as needed   Outcome: Progressing     Problem: Ineffective Coping  Goal: Patient/Family verbalizes awareness of resources  Outcome: Progressing  Goal: Understands least restrictive measures  Description  Interventions:  - Utilize least restrictive behavior  Outcome: Progressing     Problem: Risk for Self Injury/Neglect  Goal: Treatment Goal: Remain safe during length of stay, learn and adopt new coping skills, and be free of self-injurious ideation, impulses and acts at the time of discharge  Outcome: Progressing  Goal: Verbalize thoughts and feelings  Description  Interventions:  - Assess and re-assess patient's lethality and potential for self-injury  - Engage patient in 1:1 interactions, daily, for a minimum of 15 minutes  - Encourage patient to express feelings, fears, frustrations, hopes  - Establish rapport/trust with patient   Outcome: Progressing  Goal: Refrain from harming self  Description  Interventions:  - Monitor patient closely, per order  - Develop a trusting relationship  - Supervise medication ingestion, monitor effects and side effects   Outcome: Progressing     Problem: Depression  Goal: Verbalize thoughts and feelings  Description  Interventions:  - Assess and re-assess patient's level of risk   - Engage patient in 1:1 interactions, daily, for a minimum of 15 minutes   - Encourage patient to express feelings, fears, frustrations, hopes   Outcome: Progressing     Problem: Alteration in Orientation  Goal: Interact with staff daily  Description  Interventions:  - Assess and re-assess patient's level of orientation  - Engage patient in 1 on 1 interactions, daily, for a minimum of 15 minutes   - Establish rapport/trust with patient   Outcome: Progressing     Problem: PAIN - ADULT  Goal: Verbalizes/displays adequate comfort level or baseline comfort level  Description  Interventions:  - Encourage patient to monitor pain and request assistance  - Assess pain using appropriate pain scale  - Administer analgesics based on type and severity of pain and evaluate response  - Implement non-pharmacological measures as appropriate and evaluate response  - Consider cultural and social influences on pain and pain management  - Notify physician/advanced practitioner if interventions unsuccessful or patient reports new pain  Outcome: Progressing     Problem: SAFETY ADULT  Goal: Patient will remain free of falls  Description  INTERVENTIONS:  - Assess patient frequently for physical needs  -  Identify cognitive and physical deficits and behaviors that affect risk of falls    -  [place] fall precautions as indicated by assessment   - Educate patient/family on patient safety including physical limitations  - Instruct patient to call for assistance with activity based on assessment  - Modify environment to reduce risk of injury  - Consider OT/PT consult to assist with strengthening/mobility  Outcome: Progressing     Problem: Alteration in Thoughts and Perception  Goal: Treatment Goal: Gain control of psychotic behaviors/thinking, reduce/eliminate presenting symptoms and demonstrate improved reality functioning upon discharge  Outcome: Not Progressing  Goal: Attend and participate in unit activities, including therapeutic, recreational, and educational groups  Description  Interventions:  - Provide therapeutic and educational activities daily, encourage attendance and participation, and document same in the medical record     CERTIFIED PEER SPECIALIST INTERVENTIONS:    Complete peer assessment with patient to assess their needs and identify their goals to complete while in the recovery program as well as once discharged into the community  Patient will complete WRAP Plan, Crisis Plan and 5 Life Domains  Patient will attend 50% of groups offered on the unit  Patient will complete a goal card weekly      Outcome: Not Progressing  Goal: Recognize dysfunctional thoughts, communicate reality-based thoughts at the time of discharge  Description  Interventions:  - Provide medication and psycho-education to assist patient in compliance and developing insight into his/her illness   Outcome: Not Progressing  Goal: Complete daily ADLs, including personal hygiene independently, as able  Description  Interventions:  - Observe, teach, and assist patient with ADLS  - Monitor and promote a balance of rest/activity, with adequate nutrition and elimination   Outcome: Not Progressing     Problem: Ineffective Coping  Goal: Identifies ineffective coping skills  Outcome: Not Progressing  Goal: Identifies healthy coping skills  Outcome: Not Progressing  Goal: Demonstrates healthy coping skills  Outcome: Not Progressing  Goal: Participates in unit activities  Description  Interventions:  - Provide therapeutic environment   - Provide required programming   - Redirect inappropriate behaviors   Outcome: Not Progressing     Problem: Risk for Self Injury/Neglect  Goal: Attend and participate in unit activities, including therapeutic, recreational, and educational groups  Description  Interventions:  - Provide therapeutic and educational activities daily, encourage attendance and participation, and document same in the medical record  - Obtain collateral information, encourage visitation and family involvement in care   Outcome: Not Progressing  Goal: Recognize maladaptive responses and adopt new coping mechanisms  Outcome: Not Progressing  Goal: Complete daily ADLs, including personal hygiene independently, as able  Description  Interventions:  - Observe, teach, and assist patient with ADLS  - Monitor and promote a balance of rest/activity, with adequate nutrition and elimination  Outcome: Not Progressing     Problem: Depression  Goal: Treatment Goal: Demonstrate behavioral control of depressive symptoms, verbalize feelings of improved mood/affect, and adopt new coping skills prior to discharge  Outcome: Not Progressing  Goal: Refrain from isolation  Description  Interventions:  - Develop a trusting relationship   - Encourage socialization   Outcome: Not Progressing  Goal: Refrain from self-neglect  Outcome: Not Progressing     Problem: Anxiety  Goal: Anxiety is at manageable level  Description  Interventions:  - Assess and monitor patient's anxiety level  - Monitor for signs and symptoms of anxiety both physical and emotional (heart palpitations, chest pain, shortness of breath, headaches, nausea, feeling jumpy, restlessness, irritable, apprehensive)  - Collaborate with interdisciplinary team and initiate plan and interventions as ordered    - [place] patient to unit/surroundings  - Explain treatment plan  - Encourage participation in care  - Encourage verbalization of concerns/fears  - Identify coping mechanisms  - Assist in developing anxiety-reducing skills  - Administer/offer alternative therapies  - Limit or eliminate stimulants  Outcome: Not Progressing     Problem: Nutrition/Hydration-ADULT  Goal: Nutrient/Hydration intake appropriate for improving, restoring or maintaining nutritional needs  Description  Monitor and assess patient's nutrition/hydration status for malnutrition  Collaborate with interdisciplinary team and initiate plan and interventions as ordered  Monitor patient's weight and dietary intake as ordered or per policy  Utilize nutrition screening tool and intervene as necessary  Determine patient's food preferences and provide high-protein, high-caloric foods as appropriate  INTERVENTIONS:  - Monitor oral intake, urinary output, labs, and treatment plans  - Assess nutrition and hydration status and recommend course of action  - Evaluate amount of meals eaten  - Assist patient with eating if necessary   - Allow adequate time for meals  - Recommend/ encourage appropriate diets, oral nutritional supplements, and vitamin/mineral supplements  - Order, calculate, and assess calorie counts as needed  - Recommend, monitor, and adjust tube feedings and TPN/PPN based on assessed needs  - Assess need for intravenous fluids  - Provide specific nutrition/hydration education as appropriate  - Include patient/family/caregiver in decisions related to nutrition  Outcome: Not Progressing   Isolative to her bed in her room except to come out for meds and meals when prompted  Continues to not attend any groups and still has numerous excuses why she cannot  Remains delusional and somatically preoccupied  Continues to be paranoid and suspicious about staff, now accusing staff of targeting her when limits are set with her  Still refuses to shower, looks disheveled and unkempt  Ate poorly only eating 25% of breakfast and 5% of lunch  No other behaviors or issues noted  Continue to monitor

## 2020-04-20 NOTE — TREATMENT TEAM
04/20/20 1100   Activity/Group Checklist   Group   (IMR/Cognitive Restructuring )   Attendance Did not attend   Attendance Duration (min) 46-60   Affect/Mood IRMA

## 2020-04-20 NOTE — PROGRESS NOTES
Psychiatry Progress Note [address] 60 y o  female MRN: [id number]  [facility] Encounter: [id number]  Code Status: Level 1 - Full Code    PCP: Anne Stern PA-C    Date of Admission:  7/23/2019 0261   Date of Service:  04/20/20  Patient Active Problem List   Diagnosis    COPD with asthma ([facility]    Tobacco use disorder, continuous    Compression fracture of L4 lumbar vertebra    Ventral hernia    Acute on chronic respiratory failure with hypoxia ([facility] )    [facility], [facility]    Acquired hypothyroidism    Gastroesophageal reflux disease without esophagitis    Abnormal CT of the chest    Excessive cerumen in left ear canal    Lipoma of right upper extremity    Noncompliant with deep vein thrombosis (DVT) prophylaxis    At risk for aspiration    Ear ache    Tachycardia    Chest pain    Low HDL (under 40)     Diagnosis schizoaffective bipolar    Assessment   Overall Status:  Regressing again becoming increasingly psychosomatic and passive-aggressive refusing groups and attend to ADLs   Certification Statement to demonstrate a period of stability by attending to ADLs and becoming less psychosomatic in attending more groups Acceptance by patient:  Accepting  Brigido Persons in recovery:  Living at another personal care home   Understanding of medications: Yes    Involved in reintegration process: On hold due to [address] in relationship with psychiatrist:  Trusting sometimes    Medication changes    None today    Non-pharmacological treatments   Continue with individual, group, milieu and occupational therapy using recovery principles and psycho-education about accepting illness and the need for treatment   Encourage to take showers twice a week and cooperate with treatment and adl skills as per her behav   Plan   Another therapeutic pass with sister now that she did well  with the assigned staff escort to the park but it is now on hold due to [address] virus   Prepare for the [address] [place] [address] Rd and encouraged to accept  rather than refuse it    Safety   Safety and communication plan established to target dynamic risk factors discussed above  Discharge Plan   · back to a [facility] at [address]    Patient was seen by cardiologist on Friday and Holter monitor was done results of which are pending  She was arguing with the cardiologist demanding an echocardiogram despite reminding her that that is not a test that is necessary based on her symptoms  She is again becoming suspicious as to the motives of certain staff examines her pills and accuses them of withholding information from her and has even accused her therapist of having a relationship with the [facility] worker etc   She is becoming passive-aggressive by not attending groups anymore claiming that she is resting from her heart condition!    She continues to have psychosomatic symptoms of choking on her food dying of her shortness of breath and from a heart attack etc   She is still not sure about going to the [facility] who have already accepted  She continues to exhibit some covert paranoia and increased anxiety and dysphoria  Hygiene is still not that great  Compliant with medications so far  Staff reports she has not taken a shower since April 10th and she agrees to take 1 today  Sleep:   Fair  Appetite:   Fair but she picks and chooses her food  Side effects:   none  Review of systems:  Continues to have psychosomatic symptoms now focused on her heart because of the Holter monitor ordered  Mental Status Exam  Appearance:  Appears older than her stated age, poorly groomed poorly kept  and not wearing clean clothes found laying on bed but readily did get up when approached  Anxious preoccupied    Hair not combed  Behavior:  Superficially friendly pleasant but anxious suspicious  Speech:  Delayed with increased latency of production and tangential at timesl   Mood:  Euphoric anxious irritated,    Affect: increased in intensity range mood congruent redirectable  Thought Process:  Circumstantial with tendency to have stilted speech logical pursue rate to  Thought Content:  Continues to have some paranoia about motives of staff switching her medications or tampering with her inhalant or her oxygen concentrator off and on  She also has to examine her pills literally before taking them  No preoccupation with violence or suicide  No current suicidal homicidal thoughts intent or plans reported  No phobias but has obsessions and compulsions about examining her pills before she takes them  [place] Suresh Psychosomatic about dying of talking from food and from heart attack  Perceptual Disturbances:  None reported   Risk Potential:  Ability to care for herself and refusal to take showers  Sensorium: fully oriented to place, person, time, date, day, month, year and to situation  Cognition:  Grossly intact, aware of current events like the corona virus situation, recent and remote memory intact    No language deficit  Consciousness:  Alert and awake  Attention:  fair  Intellect:  average  Insight:  limited  Judgment:  fair  Motor Activity: No abnormal involuntary movement noted today    Vitals  Temp:  [97 2 °F (36 2 °C)] 97 2 °F (36 2 °C)  HR:  [71-82] 82  Resp:  [16] 16  BP: ()/(57-66) 120/66  SpO2:  [91 %] 91 %  No intake or output data in the 24 hours ending 04/20/20 0754    Lab Results: No New Labs Available For Today  Results from last 7 days   Lab Units 04/20/20  0556   SODIUM mmol/L 139   POTASSIUM mmol/L 3 9   CHLORIDE mmol/L 104   CO2 mmol/L 29   ANION GAP mmol/L 6   BUN mg/dL 18   CREATININE mg/dL 0 69   GLUCOSE RANDOM mg/dL 88   GLUCOSE FASTING mg/dL 88   CALCIUM mg/dL 9 2   EGFR ml/min/1 73sq m 94         Current Facility-Administered Medications:  acetaminophen 325 mg Oral Q6H PRN Krystyna Mcclain MD   acetaminophen 650 mg Oral Q6H PRN Carol Ramos Robinson Stein MD   acetaminophen 650 mg Oral Q8H PRN Deedee Muir MD   albuterol 2 puff Inhalation Q4H PRN Deedee Muir MD   aluminum-magnesium hydroxide-simethicone 15 mL Oral Q4H PRN Deedee Muir MD   ammonium lactate 1 application Topical BID PRN Deedee Muir MD   benzonatate 100 mg Oral TID PRN Deedee Muir MD   benztropine 1 mg Intramuscular Q8H PRN Deedee Muir MD   carbamide peroxide 5 drop Left Ear BID PRN Deedee Muir MD   cloZAPine 25 mg Oral BID Deedee Muir MD   cloZAPine 50 mg Oral BID Deedee Muir MD   docusate sodium 100 mg Oral BID PRN Deedee Muir MD   EPINEPHrine PF 0 15 mg Intramuscular Once PRN Deedee Muir MD   fluticasone-vilanterol 1 puff Inhalation Daily Deedee Muir MD   ketotifen 1 drop Right Eye BID PRN Deedee Muir MD   levothyroxine 125 mcg Oral Early Morning Deedee Muir MD   magnesium hydroxide 30 mL Oral Daily PRN Deedee Muir MD   montelukast 10 mg Oral HS Deedee Muir MD   OLANZapine 5 mg Intramuscular Q8H PRN Deedee Muir MD   OLANZapine 5 mg Oral Q8H PRBJORN Muir MD   ondansetron 4 mg Oral Q6H PRBJORN Muir MD   pantoprazole 40 mg Oral Early Morning Deedee Muir MD   polyethylene glycol 17 g Oral Daily PRBJORN Muir MD   polyvinyl alcohol 1 drop Both Eyes Q3H PRN Deedee Muir MD   sertraline 175 mg Oral Daily Deedee Muir MD   sucralfate 1,000 mg Oral BID Deedee Muir MD   theophylline 200 mg Oral Daily Deedee Muir MD   tiotropium 18 mcg Inhalation Daily Deedee Muir MD   traZODone 25 mg Oral HS PRN Deedee Muir MD       Counseling / Coordination of Care: Total floor / unit time spent today 15 minutes  Greater than 50% of total time was spent with the patient and / or family counseling and / or somewhat receptive to supportive listening and teaching positive coping skills to deal with symptom mangement       Patient's Rights, confidentiality and exceptions to confidentiality, use of automated medical record, Jeb Patrick staff access to medical record, and consent to treatment reviewed

## 2020-04-20 NOTE — PROGRESS NOTES
VS  97 2   82   16  120/66  [email or web address]  Maggie maintained on ongoing fall and SAFE precaution   Laying in bed with eyes closed, breath even and unlabored   On O2 with humidifier @1L/m via nasal cannula  Continues rounding implemented   No somatic complaint overnight  No PRN needed for sleep aid   No indication of pain or discomfort   In no distress   Will continue to monitor  Baxter Fore was argumentative and staff splitting about her blood work this morning  Reluctant to be cooperative with orders  Accusing staff of rough handling her  Charge nurse was able to convince her the importance of bloodwork since she has not been feeling good  BMP was obtain with pending result

## 2020-04-21 NOTE — TREATMENT TEAM
04/21/20 1400   Activity/Group Checklist   Group   (Recovery Workshop )   Attendance Did not attend   Attendance Duration (min) 46-60   Affect/Mood IRMA

## 2020-04-21 NOTE — PROGRESS NOTES
Psychiatry Progress Note [address] 60 y o  female MRN: [id number]  [facility] [place] [address] Encounter: [id number]  Code Status: Level 1 - Full Code    PCP: Faustino Madsen PA-C    Date of Admission:  7/23/2019 1730   Date of Service:  04/21/20  Patient Active Problem List   Diagnosis    [facility] ([facility]    Tobacco use disorder, continuous    Compression fracture of L4 lumbar vertebra    Ventral hernia    Acute on chronic respiratory failure with hypoxia ([facility]    [facility], [facility]    Acquired hypothyroidism    Gastroesophageal reflux disease without esophagitis    Abnormal CT of the chest    Excessive cerumen in left ear canal    Lipoma of right upper extremity    Noncompliant with deep vein thrombosis (DVT) prophylaxis    At risk for aspiration    Ear ache    Tachycardia    Chest pain    Low HDL (under 40)     Diagnosis schizoaffective bipolar    Assessment   Overall Status:  Passive-aggressive not taking showers for the last 11 days, isolated withdrawn increasingly anxious and psychosomatic   Certification Statement to demonstrate a period of stability by attending to ADLs and becoming less psychosomatic in attending more groups Acceptance by patient:  Accepting  James Michaels in recovery:  Living at another personal care home   Understanding of medications: Yes    Involved in reintegration process: On hold due to [address] in relationship with psychiatrist:  Trusting sometimes    Medication changes    None today    Non-pharmacological treatments   Continue with individual, group, milieu and occupational therapy using recovery principles and psycho-education about accepting illness and the need for treatment   Encourage to take showers twice a week and cooperate with treatment and adl skills as per her behav   Plan   Another therapeutic pass with sister now that she did well  with the assigned staff escort to the park but it is now on hold due to [address] virus   Prepare for the [address] [place] [address] Rd and encouraged to accept  rather than refuse it    Safety   Safety and communication plan established to target dynamic risk factors discussed above  Discharge Plan   · back to a [facility]    Interval Progress   Holter monitor results reviewed which shows paroxysmal atrial tachycardia and no supraventricular tachycardia and therefore no further tests have been recommended  She did not take a shower again yesterday because of multiple excuses that her clothes are not clean  Continues to be disheveled poorly groomed uncombed referring to lay back on her bed and now anxious about the results of the Holter monitor  Her blood pressure was low possibly because she was not drinking enough fluids as reported by staff  She remains suspicious as to motives of the staff  He is not attending groups and is remaining psychosomatic believing that she is going to die from choking on mood of from shortness of breath or from a heart attack etc and needs frequent reassurance and support  He still reluctant in accepting the [place] personal care home where she has all the tools and was accepted  Compliant with medications but she always examines the pills before she takes them  came to teammeeting without wearing the facial mask and had to be told to wear it to come back  Sleep:   Fair  Appetite:   Fair but she picks and chooses her food  Side effects:   none  Review of systems:  Continues to have psychosomatic symptoms now focused on her heart because of the Holter monitor ordered  Mental Status Exam  Appearance:  Appears older than her stated age, poorly groomed poorly kept  and not wearing clean clothes found laying on bed but readily did get up when approached  Anxious preoccupied    Hair not combed, not wearing facial mask today and when told to wear it she just returned to her room and never came back   Behavior: Superficially friendly pleasant but anxious suspicious  Speech:  Delayed with increased latency of production and tangential at timesl   Mood:  Euphoric anxious irritated,    Affect: increased in intensity range mood congruent redirectable  Thought Process:  Circumstantial with tendency to have stilted speech logical pursue rate to  Thought Content:  Continues to have some paranoia about motives of staff switching her medications or tampering with her inhalant or her oxygen concentrator off and on  She also has to examine her pills literally before taking them  No preoccupation with violence or suicide  No current suicidal homicidal thoughts intent or plans reported  No phobias but has obsessions and compulsions about examining her pills before she takes them  Gerhardt Sep Psychosomatic about dying of talking from food and from heart attack  Perceptual Disturbances:  None reported   Risk Potential:  Ability to care for herself and refusal to take showers  Sensorium: fully oriented to place, person, time, date, day, month, year and to situation  Cognition:  Grossly intact, aware of current events like the corona virus situation, recent and remote memory intact    No language deficit  Consciousness:  Alert and awake  Attention:  fair  Intellect:  average  Insight:  limited  Judgment:  fair  Motor Activity: No abnormal involuntary movement noted today    Vitals  Temp:  [97 5 °F (36 4 °C)-99 °F (37 2 °C)] 99 °F (37 2 °C)  HR:  [71-93] 84  Resp:  [14-16] 16  BP: ()/(50-64) 124/64  SpO2:  [93 %-97 %] 97 %  No intake or output data in the 24 hours ending 04/21/20 0828    Lab Results: No New Labs Available For Today  Results from last 7 days   Lab Units 04/20/20  0556   SODIUM mmol/L 139   POTASSIUM mmol/L 3 9   CHLORIDE mmol/L 104   CO2 mmol/L 29   ANION GAP mmol/L 6   BUN mg/dL 18   CREATININE mg/dL 0 69   GLUCOSE RANDOM mg/dL 88   GLUCOSE FASTING mg/dL 88   CALCIUM mg/dL 9 2   EGFR ml/min/1 73sq m 94         Current Facility-Administered Medications:  acetaminophen 325 mg Oral Q6H PRN Zetta Killer, MD   acetaminophen 650 mg Oral Q6H PRN Zetta Killer, MD   acetaminophen 650 mg Oral Q8H PRN Zetta Killer, MD   albuterol 2 puff Inhalation Q4H PRN Zetta Killer, MD   aluminum-magnesium hydroxide-simethicone 15 mL Oral Q4H PRN Zetta Killer, MD   ammonium lactate 1 application Topical BID PRN Zetta Killer, MD   benzonatate 100 mg Oral TID PRN Zetta Killer, MD   benztropine 1 mg Intramuscular Q8H PRN Zetta Killer, MD   carbamide peroxide 5 drop Left Ear BID PRN Zetta Killer, MD   cloZAPine 25 mg Oral BID Zetta Killer, MD   cloZAPine 50 mg Oral BID Zetta Killer, MD   docusate sodium 100 mg Oral BID PRN Zetta Killer, MD   EPINEPHrine PF 0 15 mg Intramuscular Once PRN Zetta Killer, MD   fluticasone-vilanterol 1 puff Inhalation Daily Zetta Killer, MD   ketotifen 1 drop Right Eye BID PRN Zetta Killer, MD   levothyroxine 125 mcg Oral Early Morning Zetta Killer, MD   magnesium hydroxide 30 mL Oral Daily PRN Zetta Killer, MD   montelukast 10 mg Oral HS Zetta Killer, MD   OLANZapine 5 mg Intramuscular Q8H PRN Zetta Killer, MD   OLANZapine 5 mg Oral Q8H PRN Zetta Killer, MD   ondansetron 4 mg Oral Q6H PRN Zetta Killer, MD   pantoprazole 40 mg Oral Early Morning Zetta Killer, MD   polyethylene glycol 17 g Oral Daily PRN Zetta Killer, MD   polyvinyl alcohol 1 drop Both Eyes Q3H PRN Zetta Killer, MD   sertraline 175 mg Oral Daily Zetta Killer, MD   sucralfate 1,000 mg Oral BID Zetta Killer, MD   theophylline 200 mg Oral Daily Zetta Killer, MD   tiotropium 18 mcg Inhalation Daily Zetta Killer, MD   traZODone 25 mg Oral HS PRN Zetta Killer, MD       Counseling / Coordination of Care: Total floor / unit time spent today 15 minutes  Greater than 50% of total time was spent with the patient and / or family counseling and / or somewhat receptive to supportive listening and teaching positive coping skills to deal with symptom mangement       Patient's Rights, confidentiality and exceptions to confidentiality, use of automated medical record, [address] staff access to medical record, and consent to treatment reviewed

## 2020-04-21 NOTE — PROGRESS NOTES
04/21/20 0800   Team Meeting   Meeting Type Daily Rounds   Team Members Present   Team Members Present Physician;Nurse;; Other (Discipline and Name)   Physician Team Member Dr Sudhir Sheffield Team Member Dmitriy Blas RN   Care Management Team Member ASHLYN Villa   Other (Discipline and Name) Kelby Herrera, LCSW; SHANIKA Chaney     Not participating in recovery  Disheveled  Last shower 4/10

## 2020-04-21 NOTE — PLAN OF CARE
Problem: Alteration in Thoughts and Perception  Goal: Agree to be compliant with medication regime, as prescribed and report medication side effects  Description  Interventions:  - Offer appropriate PRN medication and supervise ingestion; conduct aims, as needed   Outcome: Progressing  Goal: Attend and participate in unit activities, including therapeutic, recreational, and educational groups  Description  Interventions:  - Provide therapeutic and educational activities daily, encourage attendance and participation, and document same in the medical record     CERTIFIED PEER SPECIALIST INTERVENTIONS:    Complete peer assessment with patient to assess their needs and identify their goals to complete while in the recovery program as well as once discharged into the community  Patient will complete WRAP Plan, Crisis Plan and 5 Life Domains  Patient will attend 50% of groups offered on the unit  Patient will complete a goal card weekly  Outcome: Progressing  Goal: Complete daily ADLs, including personal hygiene independently, as able  Description  Interventions:  - Observe, teach, and assist patient with ADLS  - Monitor and promote a balance of rest/activity, with adequate nutrition and elimination   Outcome: Not Progressing     Problem: Depression  Goal: Refrain from isolation  Description  Interventions:  - Develop a trusting relationship   - Encourage socialization   Outcome: Jannette Donavon Scott has been generally isolative to herself in her room, frequently napping throughout the shift  Med and meal compliant  Remains somatic in content, believing that she will have a heart attack in the shower which is her primary reason as to why she has not showered since 4/10  No groups attended this shift, appetite remains inadequate, otherwise behaviors have been controlled  Will continue to monitor

## 2020-04-21 NOTE — PLAN OF CARE
Problem: Alteration in Thoughts and Perception  Goal: Verbalize thoughts and feelings  Description  Interventions:  - Promote a nonjudgmental and trusting relationship with the patient through active listening and therapeutic communication  - Assess patient's level of functioning, behavior and potential for risk  - Engage patient in 1 on 1 interactions for a minimum of 15 minutes each session  - Encourage patient to express fears, feelings, frustrations, and discuss symptoms    - [place] patient to reality, help patient recognize reality-based thinking   - Administer medications as ordered and assess for potential side effects  - Provide the patient education related to the signs and symptoms of the illness and desired effects of prescribed medications  Outcome: Progressing  Goal: Agree to be compliant with medication regime, as prescribed and report medication side effects  Description  Interventions:  - Offer appropriate PRN medication and supervise ingestion; conduct aims, as needed   Outcome: Progressing     Problem: Alteration in Thoughts and Perception  Goal: Attend and participate in unit activities, including therapeutic, recreational, and educational groups  Description  Interventions:  - Provide therapeutic and educational activities daily, encourage attendance and participation, and document same in the medical record     CERTIFIED PEER SPECIALIST INTERVENTIONS:    Complete peer assessment with patient to assess their needs and identify their goals to complete while in the recovery program as well as once discharged into the community  Patient will complete WRAP Plan, Crisis Plan and 5 Life Domains  Patient will attend 50% of groups offered on the unit  Patient will complete a goal card weekly      Outcome: Not Progressing  Goal: Complete daily ADLs, including personal hygiene independently, as able  Description  Interventions:  - Observe, teach, and assist patient with ADLS  - Monitor and promote a balance of rest/activity, with adequate nutrition and elimination   Outcome: Not Progressing     Problem: Depression  Goal: Refrain from isolation  Description  Interventions:  - Develop a trusting relationship   - Encourage socialization   Outcome: Not Progressing     Problem: Nutrition/Hydration-ADULT  Goal: Nutrient/Hydration intake appropriate for improving, restoring or maintaining nutritional needs  Description  Monitor and assess patient's nutrition/hydration status for malnutrition  Collaborate with interdisciplinary team and initiate plan and interventions as ordered  Monitor patient's weight and dietary intake as ordered or per policy  Utilize nutrition screening tool and intervene as necessary  Determine patient's food preferences and provide high-protein, high-caloric foods as appropriate  INTERVENTIONS:  - Monitor oral intake, urinary output, labs, and treatment plans  - Assess nutrition and hydration status and recommend course of action  - Evaluate amount of meals eaten  - Assist patient with eating if necessary   - Allow adequate time for meals  - Recommend/ encourage appropriate diets, oral nutritional supplements, and vitamin/mineral supplements  - Order, calculate, and assess calorie counts as needed  - Recommend, monitor, and adjust tube feedings and TPN/PPN based on assessed needs  - Assess need for intravenous fluids  - Provide specific nutrition/hydration education as appropriate  - Include patient/family/caregiver in decisions related to nutrition  Outcome: Not Progressing     2015 Rito Baez is exceptionally disheveled/unkempt, but, says can't shower tonight as has no clean clothes, needs laundry done  Has been lamenting her dirty laundry since Weds of last week  Her scheduled laundry day is Tues  Puts the responsibility on the MHTs for not helping her launder rather than herself   Continues to isolate in room in bed w/exception of coming out for supper medicine, for meal (ate poorly 10%, ice cream only plus an Ensure)  She continues unwilling to walk down [place] for water when it is offered hourly from the kitchen & as a consequence of her inactivity coupled w/poor fluid intake her BP is again low (88/50)  Acknowledges, but, no follow through w/making an effort to be more active, hydrate, help herself  She IS interested in results of her Halter monitor  Has not responded to invitations to programming this shift (optional Fresh air, optional Movie group, PM Group)

## 2020-04-21 NOTE — PROGRESS NOTES
04/21/20 1332   Team Meeting   Meeting Type Tx Team Meeting   Initial Conference Date 04/21/20   Next Conference Date 04/28/20   Team Members Present   Team Members Present Physician;Nurse;; Other (Discipline and Name)   Physician Team Member Dr Brisa Arana Team Member Stan Pinzon, RN   Care Management Team Member ASHLYN Stanley   Other (Discipline and Name) [facility]   Patient/Family Present   Patient Present Yes   Patient's Family Present No     Patient attended treatment team meeting this morning prepared without self-assessment  Patient's group attendance for last week was left than half  Team and patient completed risk assessment and the patient did not verbalize any desire to elope from the program  Patient verbalized understanding of consequences of eloping from treatment while on a commitment  Patient verbalized no further questions or concerns at the conclusion of the meeting  Next team meeting scheduled for 4/28/2020  Due to lack of showering/personal hygiene, patient sat outside of the room  Patient reports she will be showering tonight  Patient not compliant for mask  Patient asked to go get her mask by Dr Prince Yoon, patient did not return and decided to stay in bed  Patient declined to sign treatment plan for 30 days review  Per Jose Bruce, patient's bed is open and ready at [facility] however patient needs to ADLs

## 2020-04-21 NOTE — PROGRESS NOTES
2140 Leeann Prajapati did perform her Incentive Spirometry, achieving 1250ml for 7 of 10 breaths  She did come out for HS snack & to get HS medicine w/exception of Singulair  Wearing now her QHS humidified nasal O2 @ 1L for bed

## 2020-04-21 NOTE — PROGRESS NOTES
Maggie slept throughout the night  Respirations easy and non labored on humidified oxygen 1 liter via nasal cannula  No issues or behaviors  Compliant with medication  Continue to monitor  Precautions maintained

## 2020-04-21 NOTE — PLAN OF CARE
Problem: Alteration in Thoughts and Perception  Goal: Verbalize thoughts and feelings  Description  Interventions:  - Promote a nonjudgmental and trusting relationship with the patient through active listening and therapeutic communication  - Assess patient's level of functioning, behavior and potential for risk  - Engage patient in 1 on 1 interactions for a minimum of 15 minutes each session  - Encourage patient to express fears, feelings, frustrations, and discuss symptoms    - [place] patient to reality, help patient recognize reality-based thinking   - Administer medications as ordered and assess for potential side effects  - Provide the patient education related to the signs and symptoms of the illness and desired effects of prescribed medications  Outcome: Progressing  Goal: Agree to be compliant with medication regime, as prescribed and report medication side effects  Description  Interventions:  - Offer appropriate PRN medication and supervise ingestion; conduct aims, as needed   Outcome: Progressing  Goal: Complete daily ADLs, including personal hygiene independently, as able  Description  Interventions:  - Observe, teach, and assist patient with ADLS  - Monitor and promote a balance of rest/activity, with adequate nutrition and elimination   Outcome: Progressing     Problem: Alteration in Thoughts and Perception  Goal: Attend and participate in unit activities, including therapeutic, recreational, and educational groups  Description  Interventions:  - Provide therapeutic and educational activities daily, encourage attendance and participation, and document same in the medical record     CERTIFIED PEER SPECIALIST INTERVENTIONS:    Complete peer assessment with patient to assess their needs and identify their goals to complete while in the recovery program as well as once discharged into the community  Patient will complete WRAP Plan, Crisis Plan and 5 Life Domains      Patient will attend 50% of groups offered on the unit  Patient will complete a goal card weekly  Outcome: Not Progressing     Problem: Depression  Goal: Refrain from isolation  Description  Interventions:  - Develop a trusting relationship   - Encourage socialization   Outcome: Not Progressing     Problem: Nutrition/Hydration-ADULT  Goal: Nutrient/Hydration intake appropriate for improving, restoring or maintaining nutritional needs  Description  Monitor and assess patient's nutrition/hydration status for malnutrition  Collaborate with interdisciplinary team and initiate plan and interventions as ordered  Monitor patient's weight and dietary intake as ordered or per policy  Utilize nutrition screening tool and intervene as necessary  Determine patient's food preferences and provide high-protein, high-caloric foods as appropriate  INTERVENTIONS:  - Monitor oral intake, urinary output, labs, and treatment plans  - Assess nutrition and hydration status and recommend course of action  - Evaluate amount of meals eaten  - Assist patient with eating if necessary   - Allow adequate time for meals  - Recommend/ encourage appropriate diets, oral nutritional supplements, and vitamin/mineral supplements  - Order, calculate, and assess calorie counts as needed  - Recommend, monitor, and adjust tube feedings and TPN/PPN based on assessed needs  - Assess need for intravenous fluids  - Provide specific nutrition/hydration education as appropriate  - Include patient/family/caregiver in decisions related to nutrition  Outcome: Not Ivette Sargent was quite put out w/staff when told she is expected to do more for herself such as comb out own hair, do own shampoo once set up for shower  Saying too fatigued to complete the task without help, if holds her arms over her head to scrub scalp,  becomes SOB  Reluctantly @ least partially combed out hair, did shower & shampoo  Needed help combing out wet hair, drying it w/dryer, re-braiding it   Did come out then for supper medicine & for meal, but, ate poorly, 10% (milk) & her Ensure  Asleep in bed since conclusion of meal  Did not respond to invitation to optional [employer]

## 2020-04-21 NOTE — TREATMENT TEAM
Patient declined      04/21/20 1100   Activity/Group Checklist   Group   (IMR/Open Discussion)   Attendance Did not attend   Attendance Duration (min) 46-60   Affect/Mood IRMA

## 2020-04-22 NOTE — PLAN OF CARE
Problem: Alteration in Thoughts and Perception  Goal: Verbalize thoughts and feelings  Description  Interventions:  - Promote a nonjudgmental and trusting relationship with the patient through active listening and therapeutic communication  - Assess patient's level of functioning, behavior and potential for risk  - Engage patient in 1 on 1 interactions for a minimum of 15 minutes each session  - Encourage patient to express fears, feelings, frustrations, and discuss symptoms    - [place] patient to reality, help patient recognize reality-based thinking   - Administer medications as ordered and assess for potential side effects  - Provide the patient education related to the signs and symptoms of the illness and desired effects of prescribed medications  Outcome: Progressing  Goal: Agree to be compliant with medication regime, as prescribed and report medication side effects  Description  Interventions:  - Offer appropriate PRN medication and supervise ingestion; conduct aims, as needed   Outcome: Progressing     Problem: Risk for Self Injury/Neglect  Goal: Treatment Goal: Remain safe during length of stay, learn and adopt new coping skills, and be free of self-injurious ideation, impulses and acts at the time of discharge  Outcome: Progressing  Goal: Verbalize thoughts and feelings  Description  Interventions:  - Assess and re-assess patient's lethality and potential for self-injury  - Engage patient in 1:1 interactions, daily, for a minimum of 15 minutes  - Encourage patient to express feelings, fears, frustrations, hopes  - Establish rapport/trust with patient   Outcome: Progressing  Goal: Refrain from harming self  Description  Interventions:  - Monitor patient closely, per order  - Develop a trusting relationship  - Supervise medication ingestion, monitor effects and side effects   Outcome: Progressing     Problem: Depression  Goal: Verbalize thoughts and feelings  Description  Interventions:  - Assess and re-assess patient's level of risk   - Engage patient in 1:1 interactions, daily, for a minimum of 15 minutes   - Encourage patient to express feelings, fears, frustrations, hopes   Outcome: Progressing     Problem: Alteration in Orientation  Goal: Interact with staff daily  Description  Interventions:  - Assess and re-assess patient's level of orientation  - Engage patient in 1 on 1 interactions, daily, for a minimum of 15 minutes   - Establish rapport/trust with patient   Outcome: Progressing     Problem: PAIN - ADULT  Goal: Verbalizes/displays adequate comfort level or baseline comfort level  Description  Interventions:  - Encourage patient to monitor pain and request assistance  - Assess pain using appropriate pain scale  - Administer analgesics based on type and severity of pain and evaluate response  - Implement non-pharmacological measures as appropriate and evaluate response  - Consider cultural and social influences on pain and pain management  - Notify physician/advanced practitioner if interventions unsuccessful or patient reports new pain  Outcome: Progressing     Problem: SAFETY ADULT  Goal: Patient will remain free of falls  Description  INTERVENTIONS:  - Assess patient frequently for physical needs  -  Identify cognitive and physical deficits and behaviors that affect risk of falls    -  [place] fall precautions as indicated by assessment   - Educate patient/family on patient safety including physical limitations  - Instruct patient to call for assistance with activity based on assessment  - Modify environment to reduce risk of injury  - Consider OT/PT consult to assist with strengthening/mobility  Outcome: Progressing     Problem: Alteration in Thoughts and Perception  Goal: Treatment Goal: Gain control of psychotic behaviors/thinking, reduce/eliminate presenting symptoms and demonstrate improved reality functioning upon discharge  Outcome: Not Progressing  Goal: Attend and participate in unit activities, including therapeutic, recreational, and educational groups  Description  Interventions:  - Provide therapeutic and educational activities daily, encourage attendance and participation, and document same in the medical record     CERTIFIED PEER SPECIALIST INTERVENTIONS:    Complete peer assessment with patient to assess their needs and identify their goals to complete while in the recovery program as well as once discharged into the community  Patient will complete WRAP Plan, Crisis Plan and 5 Life Domains  Patient will attend 50% of groups offered on the unit  Patient will complete a goal card weekly      Outcome: Not Progressing  Goal: Recognize dysfunctional thoughts, communicate reality-based thoughts at the time of discharge  Description  Interventions:  - Provide medication and psycho-education to assist patient in compliance and developing insight into his/her illness   Outcome: Not Progressing     Problem: Ineffective Coping  Goal: Participates in unit activities  Description  Interventions:  - Provide therapeutic environment   - Provide required programming   - Redirect inappropriate behaviors   Outcome: Not Progressing     Problem: Risk for Self Injury/Neglect  Goal: Attend and participate in unit activities, including therapeutic, recreational, and educational groups  Description  Interventions:  - Provide therapeutic and educational activities daily, encourage attendance and participation, and document same in the medical record  - Obtain collateral information, encourage visitation and family involvement in care   Outcome: Not Progressing  Goal: Recognize maladaptive responses and adopt new coping mechanisms  Outcome: Not Progressing     Problem: Depression  Goal: Treatment Goal: Demonstrate behavioral control of depressive symptoms, verbalize feelings of improved mood/affect, and adopt new coping skills prior to discharge  Outcome: Not Progressing  Goal: Refrain from isolation  Description  Interventions:  - Develop a trusting relationship   - Encourage socialization   Outcome: Not Progressing  Goal: Refrain from self-neglect  Outcome: Not Progressing     Problem: Anxiety  Goal: Anxiety is at manageable level  Description  Interventions:  - Assess and monitor patient's anxiety level  - Monitor for signs and symptoms of anxiety both physical and emotional (heart palpitations, chest pain, shortness of breath, headaches, nausea, feeling jumpy, restlessness, irritable, apprehensive)  - Collaborate with interdisciplinary team and initiate plan and interventions as ordered  - [place] patient to unit/surroundings  - Explain treatment plan  - Encourage participation in care  - Encourage verbalization of concerns/fears  - Identify coping mechanisms  - Assist in developing anxiety-reducing skills  - Administer/offer alternative therapies  - Limit or eliminate stimulants  Outcome: Not Progressing     Problem: Nutrition/Hydration-ADULT  Goal: Nutrient/Hydration intake appropriate for improving, restoring or maintaining nutritional needs  Description  Monitor and assess patient's nutrition/hydration status for malnutrition  Collaborate with interdisciplinary team and initiate plan and interventions as ordered  Monitor patient's weight and dietary intake as ordered or per policy  Utilize nutrition screening tool and intervene as necessary  Determine patient's food preferences and provide high-protein, high-caloric foods as appropriate       INTERVENTIONS:  - Monitor oral intake, urinary output, labs, and treatment plans  - Assess nutrition and hydration status and recommend course of action  - Evaluate amount of meals eaten  - Assist patient with eating if necessary   - Allow adequate time for meals  - Recommend/ encourage appropriate diets, oral nutritional supplements, and vitamin/mineral supplements  - Order, calculate, and assess calorie counts as needed  - Recommend, monitor, and adjust tube feedings and TPN/PPN based on assessed needs  - Assess need for intravenous fluids  - Provide specific nutrition/hydration education as appropriate  - Include patient/family/caregiver in decisions related to nutrition  Outcome: Not Progressing   Patient has been isolative to her bed in her room except to come out for meds and meals when prompted  Continues to not attend any groups, offers numerous excuses why she cannot  Remains delusional and somatically preoccupied, insisting that she has a "serious heart condition"  Ate poorly only eating 25% of breakfast and 5% of lunch  No other behaviors or issues noted  Continue to monitor

## 2020-04-22 NOTE — TREATMENT TEAM
Patient declined      04/22/20 1000   Activity/Group Checklist   Group Community meeting  (Morning Walk/Coffee Talk )   Attendance Did not attend   Attendance Duration (min) 16-30   Affect/Mood IRMA 1 person assist/nonverbal cues (demo/gestures)/verbal cues

## 2020-04-22 NOTE — PROGRESS NOTES
Psychiatry Progress Note [address] 60 y o  female MRN: [id number]  Unit/Bed#: [facility] Encounter: [id number]  Code Status: Level 1 - Full Code    PCP: Millicent Neal PA-C    Date of Admission:  7/23/2019 1730   Date of Service:  04/22/20  Patient Active Problem List   Diagnosis    COPD with asthma ([facility]    Tobacco use disorder, continuous    Compression fracture of L4 lumbar vertebra    Ventral hernia    Acute on chronic respiratory failure with hypoxia ([facility] )    [facility]ive disorder, [facility] type [facility]    Acquired hypothyroidism    Gastroesophageal reflux disease without esophagitis    Abnormal CT of the chest    Excessive cerumen in left ear canal    Lipoma of right upper extremity    Noncompliant with deep vein thrombosis (DVT) prophylaxis    At risk for aspiration    Ear ache    Tachycardia    Chest pain    Low HDL (under 40)     Diagnosis schizoaffective bipolar    Assessment   Overall Status:  Passive-aggressive in psychosomatic isolated withdrawn hesitant to accept the [place] personal care home and still somewhat suspicious and paranoid   Certification Statement to demonstrate a period of stability by attending to ADLs and becoming less psychosomatic in attending more groups Acceptance by patient:  Accepting  Rosabel Saint in recovery:  Living at another personal care home   Understanding of medications: Yes    Involved in reintegration process: On hold due to [address] in relationship with psychiatrist:  Trusting sometimes    Medication changes    None today    Non-pharmacological treatments   Continue with individual, group, milieu and occupational therapy using recovery principles and psycho-education about accepting illness and the need for treatment   Encourage to take showers twice a week and cooperate with treatment and adl skills as per her behav   Plan   Another therapeutic pass with sister now that she did well with the assigned staff escort to the park but it is now on hold due to [address] Prepare for the [address] [place] [address] Rd and encouraged to accept  rather than refuse it    Safety   Safety and communication plan established to target dynamic risk factors discussed above  Discharge Plan   · back to a [facility] [place]    Interval Progress   Patient finally took a shower with lot of prompting and assistance from staff even though she is capable of doing things on her own and she needed help with combing her wet hair  She was too tired after the shower that she refuse the spirometer  She is better groomed and better dressed with combed hair today and was eager to please this writer having taken the shower  She was told about the results of the Holter monitor and was reassured that there is no need to do any further workup at this time  However she continues to be fearful of death from heart attack or choking on food etc as usual and continues to need frequent reassurance and support  She is still questioning the [place] personal [facility] where she has been accepted and finally she said she knows she has no other choice  She is compliant with medications but continues to examine the medications before she takes them each time and still believes that the  sleeping with the [place] !    She is not always attending the groups and was reminded that it is the expectation forward to attend at least 25% of groups  Sleep:   Fair  Appetite:   Fair but she picks and chooses her food  Side effects:   none  Review of systems:  Continues to have psychosomatic symptoms     Mental Status Exam  Appearance:  Appears older than her stated age, well groomed well ept  and  wearing clean clothes found laying on bed but readily did get up when approached  Anxious preoccupied    Has good eye contact friendly and pleasant  Behavior:  Superficially friendly pleasant but anxious suspicious  Speech:  Delayed with increased latency of production and tangential at timesl   Mood:  Euphoric anxious irritated,    Affect: increased in intensity range mood congruent redirectable  Thought Process:  Circumstantial with tendency to have stilted speech logical pursue rate to  Thought Content:  Continues to have some paranoia about motives of staff switching her medications or tampering with her inhalant or her oxygen concentrator off and on  She also has to examine her pills literally before taking them  No preoccupation with violence or suicide  No current suicidal homicidal thoughts intent or plans reported  No phobias but has obsessions and compulsions about examining her pills before she takes them  Irais Ames Psychosomatic about dying of talking from food and from heart attack  Perceptual Disturbances:  None reported   Risk Potential:  Ability to care for herself and refusal to take showers  Sensorium: fully oriented to place, person, time, date, day, month, year and to situation  Cognition:  Grossly intact, aware of current events like the corona virus situation, recent and remote memory intact    No language deficit  Consciousness:  Alert and awake  Attention:  fair  Intellect:  average  Insight:  limited  Judgment:  fair  Motor Activity: No abnormal involuntary movement noted today    Vitals  Temp:  [96 9 °F (36 1 °C)-97 °F (36 1 °C)] 96 9 °F (36 1 °C)  HR:  [83-95] 95  Resp:  [16-20] 20  BP: ()/(62-73) 128/73  SpO2:  [91 %-92 %] 92 %  No intake or output data in the 24 hours ending 04/22/20 0748    Lab Results: No New Labs Available For Today  Results from last 7 days   Lab Units 04/20/20  0556   SODIUM mmol/L 139   POTASSIUM mmol/L 3 9   CHLORIDE mmol/L 104   CO2 mmol/L 29   ANION GAP mmol/L 6   BUN mg/dL 18   CREATININE mg/dL 0 69   GLUCOSE RANDOM mg/dL 88   GLUCOSE FASTING mg/dL 88   CALCIUM mg/dL 9 2   EGFR ml/min/1 73sq m 94         Current Facility-Administered Medications:  acetaminophen 325 mg Oral Q6H PRN Sandhya Bolaños MD acetaminophen 650 mg Oral Q6H PRN Ervin Little MD   acetaminophen 650 mg Oral Q8H PRN Ervin Little MD   albuterol 2 puff Inhalation Q4H PRN Ervin Little MD   aluminum-magnesium hydroxide-simethicone 15 mL Oral Q4H PRN Ervin Little MD   ammonium lactate 1 application Topical BID PRN Ervin Little MD   benzonatate 100 mg Oral TID PRN Ervin Little MD   benztropine 1 mg Intramuscular Q8H PRN Ervin Little MD   carbamide peroxide 5 drop Left Ear BID PRN Ervin Little MD   cloZAPine 25 mg Oral BID Ervin Little MD   cloZAPine 50 mg Oral BID Ervin Little MD   docusate sodium 100 mg Oral BID PRN Ervin Little MD   EPINEPHrine PF 0 15 mg Intramuscular Once PRN Ervin Little MD   fluticasone-vilanterol 1 puff Inhalation Daily Ervin Little MD   ketotifen 1 drop Right Eye BID PRN Ervin Little MD   levothyroxine 125 mcg Oral Early Morning Ervin Little MD   magnesium hydroxide 30 mL Oral Daily PRN Ervin Little MD   montelukast 10 mg Oral HS Ervin Little MD   OLANZapine 5 mg Intramuscular Q8H PRN Ervin Little MD   OLANZapine 5 mg Oral Q8H PRN Ervin Little MD   ondansetron 4 mg Oral Q6H PRN Ervin Little MD   pantoprazole 40 mg Oral Early Morning Ervin Little MD   polyethylene glycol 17 g Oral Daily PRN Ervin Little MD   polyvinyl alcohol 1 drop Both Eyes Q3H PRN Ervin Little MD   sertraline 175 mg Oral Daily Ervin Little MD   sucralfate 1,000 mg Oral BID Ervin Little MD   theophylline 200 mg Oral Daily Ervin Little MD   tiotropium 18 mcg Inhalation Daily Ervin Little MD   traZODone 25 mg Oral HS PRN Ervin Little MD       Counseling / Coordination of Care: Total floor / unit time spent today 15 minutes  Greater than 50% of total time was spent with the patient and / or family counseling and / or somewhat receptive to supportive listening and teaching positive coping skills to deal with symptom mangement       Patient's Rights, confidentiality and exceptions to confidentiality, use of automated medical record, Jeb Patrick staff access to medical record, and consent to treatment reviewed

## 2020-04-22 NOTE — PROGRESS NOTES
VS 96 9   95   20   128/73   Kris@[employer]  Maggie maintained on ongoing fall and SAFE precaution   Laying in bed with eyes closed, breath even and unlabored   On O2 with humidifier @1L/m via nasal cannula  Continues rounding implemented   No somatic complaint overnight  No PRN needed for sleep aid   No indication of pain or discomfort   In no distress   Will continue to monitor

## 2020-04-22 NOTE — PROGRESS NOTES
04/22/20 0800   Team Meeting   Meeting Type Daily Rounds   Team Members Present   Team Members Present Physician;Nurse; Other (Discipline and Name)   Physician Team Member Dr Paty Horta, RN   Other (Discipline and Name) Kelby Herrera, JERW; SHANIKA Chaney     Showered  Appetite remains poor  Suspicious, accusatory

## 2020-04-22 NOTE — TREATMENT TEAM
04/22/20 1100   Activity/Group Checklist   Group   ([facility]/Recovery Shashank )   Attendance Did not attend   Attendance Duration (min) 46-60   Affect/Mood IRMA

## 2020-04-23 NOTE — PLAN OF CARE
Problem: Individualized Interventions  Goal: Attend and participate in unit activities, including therapeutic, recreational, and educational groups  Description    PSYCHOTHERAPY INTERVENTIONS:    -Form therapeutic alliance to promote trust and safety within a therapeutic environment    -Engage in individual psychotherapy using evidence-based practices that are specific to individual needs   -Process barriers to community living with an emphasis on solution-based interventions   -Promote self-awareness and identity development   -Identify and process patterns of behavior that have led to decompensation and/or hospitalizations   -Work with treatment team in reintegration back into the community when appropriate  Outcome: Progressing    Patient and therapist met for brief encounter  Patient and therapist explored patient's current behavioral patterns which she attributes to the panic attack she had several weeks ago, as well as needing to wear a heart monitor  She was unable to identify how these events are impacting her current pattern of non-participation and decline in ADLs  Patient was reminded that [facility] will not be able to accept her in the condition she is in, so if a bed opened up "tomorrow," she would not be able to take it  Patient asked what her "other options" were, and she was informed that the only other option that would meet her current need would be the [facility]   Patient expressed that she does not want to go to the [facility] and that she planned to begin tomorrow with a "clean slate " Patient denied needing anything from this therapist in order to get started  Therapist will continue to support patient in achieving a community discharge

## 2020-04-23 NOTE — PLAN OF CARE
Problem: Alteration in Thoughts and Perception  Goal: Verbalize thoughts and feelings  Description  Interventions:  - Promote a nonjudgmental and trusting relationship with the patient through active listening and therapeutic communication  - Assess patient's level of functioning, behavior and potential for risk  - Engage patient in 1 on 1 interactions for a minimum of 15 minutes each session  - Encourage patient to express fears, feelings, frustrations, and discuss symptoms    - [place] patient to reality, help patient recognize reality-based thinking   - Administer medications as ordered and assess for potential side effects  - Provide the patient education related to the signs and symptoms of the illness and desired effects of prescribed medications  Outcome: Progressing  Goal: Agree to be compliant with medication regime, as prescribed and report medication side effects  Description  Interventions:  - Offer appropriate PRN medication and supervise ingestion; conduct aims, as needed   Outcome: Progressing     Problem: Alteration in Thoughts and Perception  Goal: Attend and participate in unit activities, including therapeutic, recreational, and educational groups  Description  Interventions:  - Provide therapeutic and educational activities daily, encourage attendance and participation, and document same in the medical record     CERTIFIED PEER SPECIALIST INTERVENTIONS:    Complete peer assessment with patient to assess their needs and identify their goals to complete while in the recovery program as well as once discharged into the community  Patient will complete WRAP Plan, Crisis Plan and 5 Life Domains  Patient will attend 50% of groups offered on the unit  Patient will complete a goal card weekly      Outcome: Not Progressing     Problem: Ineffective Coping  Goal: Demonstrates healthy coping skills  Outcome: Not Progressing     Problem: Depression  Goal: Refrain from isolation  Description  Interventions:  - Develop a trusting relationship   - Encourage socialization   Outcome: Not Progressing     Problem: Nutrition/Hydration-ADULT  Goal: Nutrient/Hydration intake appropriate for improving, restoring or maintaining nutritional needs  Description  Monitor and assess patient's nutrition/hydration status for malnutrition  Collaborate with interdisciplinary team and initiate plan and interventions as ordered  Monitor patient's weight and dietary intake as ordered or per policy  Utilize nutrition screening tool and intervene as necessary  Determine patient's food preferences and provide high-protein, high-caloric foods as appropriate  INTERVENTIONS:  - Monitor oral intake, urinary output, labs, and treatment plans  - Assess nutrition and hydration status and recommend course of action  - Evaluate amount of meals eaten  - Assist patient with eating if necessary   - Allow adequate time for meals  - Recommend/ encourage appropriate diets, oral nutritional supplements, and vitamin/mineral supplements  - Order, calculate, and assess calorie counts as needed  - Recommend, monitor, and adjust tube feedings and TPN/PPN based on assessed needs  - Assess need for intravenous fluids  - Provide specific nutrition/hydration education as appropriate  - Include patient/family/caregiver in decisions related to nutrition  Outcome: Not Progressing     [address] continues to believe she has a cardiac condition which hampers her ability to be active  Relates having had central chest pressure on 11-7 that lasted "about a minute", but, didn't tell anyone  "I just fell back to sleep " Continues to believe needs additional cardiac workup  She is isolative to her room & bed where lays asleep  She is not making the effort to go to kitchen [facility] when announcement made to come for fresh water if desired   Instead, she holds out cup to this nurse in the posture of a beggar asking that water be obtained for her  Reminded her of responsibility to make efforts to help herself, do tasks for which she is capable  Did leave room for supper medicine & for meal  But, did not touch meal, had only the Ensure  Returned to bed  Has not responded to invitation to PM Group activities offered  Presents as wan & stefania

## 2020-04-23 NOTE — PROGRESS NOTES
Psychiatry Progress Note [address] 60 y o  female MRN: [id number]  Unit/Bed#: MARCIO ADAIR [place] [id number] Encounter: [id number]  Code Status: Level 1 - Full Code    PCP: Kirk Mckeon PA-C    Date of Admission:  7/23/2019 7813   Date of Service:  04/23/20  Patient Active Problem List   Diagnosis    COPD with asthma ([facility] )    Tobacco use disorder, continuous    Compression fracture of L4 lumbar vertebra    Ventral hernia    Acute on chronic respiratory failure with hypoxia ([facility] )    Schizoaffective disorder, bipolar type ([facility] )    Acquired hypothyroidism    Gastroesophageal reflux disease without esophagitis    Abnormal CT of the chest    Excessive cerumen in left ear canal    Lipoma of right upper extremity    Noncompliant with deep vein thrombosis (DVT) prophylaxis    At risk for aspiration    Ear ache    Tachycardia    Chest pain    Low HDL (under 40)     Diagnosis schizoaffective bipolar    Assessment   Overall Status:  Increasingly psychosomatic claiming that she has a serious cardiac condition and not fully attending groups and becoming passive-aggressive and suspicious   Certification Statement to demonstrate a period of stability by attending to ADLs and becoming less psychosomatic in attending more groups Acceptance by patient:  Accepting  Vlad Graver in recovery:  Living at another personal care home   Understanding of medications: Yes    Involved in reintegration process: On hold due to [address] in relationship with psychiatrist:  Trusting sometimes    Medication changes    None today    Non-pharmacological treatments   Continue with individual, group, milieu and occupational therapy using recovery principles and psycho-education about accepting illness and the need for treatment   Encourage to take showers twice a week and cooperate with treatment and adl skills as per her behav   Plan   Another therapeutic pass with sister now that she did well  with the assigned staff escort to the park but it is now on hold due to [address] Prepare for the [address] [place] [address] Rd and encouraged to accept  rather than refuse it    Safety   Safety and communication plan established to target dynamic risk factors discussed above  Discharge Plan   · back to a [facility] at [address]   Prefers to stay back on her bed not attending all the groups being somatically preoccupied with her heart believing that she is a few years heart condition refusing to accept the results of the Holter monitor or the cardiac consultation  She complains of feeling too tired and again questions the motives of staff and insists on examining the pills before she takes them and continues to believe staff his tampering with her medications as well as her spirometer and oxygen concentrator  Compliant with her medications  Still questioning going to the [facility] where she was accepted  Her group attendance have diminished drastically and she was again reminded that she needs to keep up with his to the purpose and the group attendance and to work with the team and go back to the [facility] she has no other choices at this time  Group attendance:Poor  Sleep:   Fair  Appetite:   Fair but she picks and chooses her food  Compliance with medications:  Good  Side effects:   none  Review of systems:  Continues to have psychosomatic symptoms     Mental Status Exam  Appearance:  Appears older than her stated age, well groomed well ept  and  wearing clean clothes found laying on bed but readily got when approached  Anxious preoccupied    Has good eye contact friendly and pleasant today  Behavior:  Superficially friendly pleasant but anxious suspicious  Speech:  Delayed with increased latency of production and tangential at times with tendency to become stilted   Mood:  Euphoric anxious irritated,    Affect: increased in intensity range mood congruent redirectable  Thought Process:  Circumstantial with tendency to have stilted speech logical pursue rate to  Thought Content:  Continues to have some paranoia about motives of staff switching her medications or tampering with her inhalant or her oxygen concentrator off and on  Accusing  sleeping with the  at the [facility] ! She also has to examine her pills literally before taking them  No preoccupation with violence or suicide  No current suicidal homicidal thoughts intent or plans reported  No phobias but has obsessions and compulsions about examining her pills before she takes them  Goran Elmore Psychosomatic about dying of talking from food and from heart attack  Perceptual Disturbances:  None reported   Risk Potential:  Ability to care for herself and refusal to take showers  Sensorium: fully oriented to place, person, time, date, day, month, year and to situation  Cognition:  Grossly intact, aware of current events like the corona virus situation, recent and remote memory intact    No language deficit  Consciousness:  Alert and awake  Attention:  fair  Intellect:  average  Insight:  limited  Judgment:  fair  Motor Activity: No abnormal involuntary movement noted today    Vitals  Temp:  [97 °F (36 1 °C)-97 9 °F (36 6 °C)] 97 9 °F (36 6 °C)  HR:  [] 71  Resp:  [14-18] 18  BP: (90-98)/(54-66) 97/56  SpO2:  [93 %-96 %] 96 %  No intake or output data in the 24 hours ending 04/23/20 8451    Lab Results: No New Labs Available For Today  Results from last 7 days   Lab Units 04/20/20  0556   SODIUM mmol/L 139   POTASSIUM mmol/L 3 9   CHLORIDE mmol/L 104   CO2 mmol/L 29   ANION GAP mmol/L 6   BUN mg/dL 18   CREATININE mg/dL 0 69   GLUCOSE RANDOM mg/dL 88   GLUCOSE FASTING mg/dL 88   CALCIUM mg/dL 9 2   EGFR ml/min/1 73sq m 94         Current Facility-Administered Medications:  acetaminophen 325 mg Oral Q6H PRN Shi Pham MD   acetaminophen 650 mg Oral Q6H PRN Shi Pham MD   acetaminophen 650 mg Oral Q8H PRN Shaggy Rangel MD   albuterol 2 puff Inhalation Q4H PRN Shaggy Rangel MD   aluminum-magnesium hydroxide-simethicone 15 mL Oral Q4H PRN Shaggy Rangel MD   ammonium lactate 1 application Topical BID PRN Shaggy Rangel MD   benzonatate 100 mg Oral TID PRN Shaggy Rangel MD   benztropine 1 mg Intramuscular Q8H PRN Shaggy Rangel MD   carbamide peroxide 5 drop Left Ear BID PRN Shaggy Rangel MD   cloZAPine 25 mg Oral BID Shaggy Rangel MD   cloZAPine 50 mg Oral BID Shaggy Rangel MD   docusate sodium 100 mg Oral BID PRN Shaggy Rangel MD   EPINEPHrine PF 0 15 mg Intramuscular Once PRN Shaggy Rangel MD   fluticasone-vilanterol 1 puff Inhalation Daily Shaggy Rangel MD   ketotifen 1 drop Right Eye BID PRN Shaggy Rangel MD   levothyroxine 125 mcg Oral Early Morning Shaggy Rangel MD   magnesium hydroxide 30 mL Oral Daily PRN Shaggy Rangel MD   montelukast 10 mg Oral HS Shaggy Rangel MD   OLANZapine 5 mg Intramuscular Q8H PRN Shaggy Rangel MD   OLANZapine 5 mg Oral Q8H PRN Shaggy Rangel MD   ondansetron 4 mg Oral Q6H PRN Shaggy Rangel MD   pantoprazole 40 mg Oral Early Morning Shaggy Rangel MD   polyethylene glycol 17 g Oral Daily PRN Shaggy Rangel MD   polyvinyl alcohol 1 drop Both Eyes Q3H PRN Shaggy Rangel MD   sertraline 175 mg Oral Daily Shaggy Rangel MD   sucralfate 1,000 mg Oral BID Shaggy Rangel MD   theophylline 200 mg Oral Daily Shaggy Rangel MD   tiotropium 18 mcg Inhalation Daily Shaggy Rangel MD   traZODone 25 mg Oral HS PRN Shaggy Rangel MD       Counseling / Coordination of Care: Total floor / unit time spent today 15 minutes  Greater than 50% of total time was spent with the patient and / or family counseling and / or somewhat receptive to supportive listening and teaching positive coping skills to deal with symptom mangement  Patient's Rights, confidentiality and exceptions to confidentiality, use of automated medical record, [facility] access to medical record, and consent to treatment reviewed

## 2020-04-23 NOTE — PROGRESS NOTES
Remained isolative to her room, napping in her bed  Came out for lunch and afternoon snack  Still continues to eat poorly having only 25% of breakfast and 10% of lunch  No other behaviors or issues noted  Continue to monitor

## 2020-04-23 NOTE — PROGRESS NOTES
VS 97 0   91   18   98/54 [email or web address]  Maggie maintained on ongoing fall and SAFE precaution   Laying in bed with eyes closed, breath even and unlabored   On O2 with humidifier @1L/m via nasal cannula  Continues rounding implemented   No somatic complaint overnight  No PRN needed for sleep aid   No indication of pain or discomfort   In no distress   Will continue to monitor

## 2020-04-23 NOTE — PROGRESS NOTES
[address]er was unwilling to perform her Incentive Spirometry tonight; no reason given  This nurse reminded her that this, combined w/her inactivity, leads to decreased lung volume which places her more @ risk for the pneumonia she so much dreads  She did come out for HS snack & to take her HS medicine (except the Singulair)   Wearing now her QHS humidified nasal O2 @ 1L for bed  "I'm so-o tired tonight "

## 2020-04-23 NOTE — PLAN OF CARE
Problem: SKIN/TISSUE INTEGRITY - ADULT  Goal: Incision(s), wounds(s) or drain site(s) healing without S/S of infection  Description  INTERVENTIONS  - Assess and document risk factors for skin impairment   - Assess and document dressing, incision, wound bed, drain sites and surrounding tissue  - Consider nutrition services referral as needed  - Oral mucous membranes remain intact  - Provide patient/ family education  Outcome: Progressing     Problem: SKIN/TISSUE INTEGRITY - ADULT  Goal: Skin integrity remains intact  Description  INTERVENTIONS  - Identify patients at risk for skin breakdown  - Assess and monitor skin integrity  - Assess and monitor nutrition and hydration status  - Monitor labs (i e  albumin)  - Assess for incontinence   - Turn and reposition patient  - Assist with mobility/ambulation  - Relieve pressure over bony prominences  - Avoid friction and shearing  - Provide appropriate hygiene as needed including keeping skin clean and dry  - Evaluate need for skin moisturizer/barrier cream  - Collaborate with interdisciplinary team (i e  Nutrition, Rehabilitation, etc )   - Patient/family teaching  Outcome: Not Progressing  At 0920 patient reported having an abrasion from where one of the Holter monitor leads was  Upon inspection there was a small 1 5cm superficial abraded area just to the right of of sternal area  Cleaned area with adhesive remover to remove any residual adhesive still remaining and then with an alcohol pad per patient request because she did not like how the adhesive remover felt "oily" on her skin  Advised patient that there was no need to put ointments / medication on it, leave open to air, and keep area clean / gently wash with soap and water  Also told patient will update her chart to indicate a possible allergy / sensitivity to the lead adhesive

## 2020-04-23 NOTE — PLAN OF CARE
Problem: Alteration in Thoughts and Perception  Goal: Verbalize thoughts and feelings  Description  Interventions:  - Promote a nonjudgmental and trusting relationship with the patient through active listening and therapeutic communication  - Assess patient's level of functioning, behavior and potential for risk  - Engage patient in 1 on 1 interactions for a minimum of 15 minutes each session  - Encourage patient to express fears, feelings, frustrations, and discuss symptoms    - [place] patient to reality, help patient recognize reality-based thinking   - Administer medications as ordered and assess for potential side effects  - Provide the patient education related to the signs and symptoms of the illness and desired effects of prescribed medications  Outcome: Progressing  Goal: Agree to be compliant with medication regime, as prescribed and report medication side effects  Description  Interventions:  - Offer appropriate PRN medication and supervise ingestion; conduct aims, as needed   Outcome: Progressing     Problem: Risk for Self Injury/Neglect  Goal: Treatment Goal: Remain safe during length of stay, learn and adopt new coping skills, and be free of self-injurious ideation, impulses and acts at the time of discharge  Outcome: Progressing  Goal: Verbalize thoughts and feelings  Description  Interventions:  - Assess and re-assess patient's lethality and potential for self-injury  - Engage patient in 1:1 interactions, daily, for a minimum of 15 minutes  - Encourage patient to express feelings, fears, frustrations, hopes  - Establish rapport/trust with patient   Outcome: Progressing  Goal: Refrain from harming self  Description  Interventions:  - Monitor patient closely, per order  - Develop a trusting relationship  - Supervise medication ingestion, monitor effects and side effects   Outcome: Progressing     Problem: Depression  Goal: Verbalize thoughts and feelings  Description  Interventions:  - Assess and re-assess patient's level of risk   - Engage patient in 1:1 interactions, daily, for a minimum of 15 minutes   - Encourage patient to express feelings, fears, frustrations, hopes   Outcome: Progressing     Problem: Anxiety  Goal: Anxiety is at manageable level  Description  Interventions:  - Assess and monitor patient's anxiety level  - Monitor for signs and symptoms of anxiety both physical and emotional (heart palpitations, chest pain, shortness of breath, headaches, nausea, feeling jumpy, restlessness, irritable, apprehensive)  - Collaborate with interdisciplinary team and initiate plan and interventions as ordered    - [place] patient to unit/surroundings  - Explain treatment plan  - Encourage participation in care  - Encourage verbalization of concerns/fears  - Identify coping mechanisms  - Assist in developing anxiety-reducing skills  - Administer/offer alternative therapies  - Limit or eliminate stimulants  Outcome: Progressing     Problem: Alteration in Orientation  Goal: Interact with staff daily  Description  Interventions:  - Assess and re-assess patient's level of orientation  - Engage patient in 1 on 1 interactions, daily, for a minimum of 15 minutes   - Establish rapport/trust with patient   Outcome: Progressing     Problem: PAIN - ADULT  Goal: Verbalizes/displays adequate comfort level or baseline comfort level  Description  Interventions:  - Encourage patient to monitor pain and request assistance  - Assess pain using appropriate pain scale  - Administer analgesics based on type and severity of pain and evaluate response  - Implement non-pharmacological measures as appropriate and evaluate response  - Consider cultural and social influences on pain and pain management  - Notify physician/advanced practitioner if interventions unsuccessful or patient reports new pain  Outcome: Progressing     Problem: Alteration in Thoughts and Perception  Goal: Attend and participate in unit activities, including therapeutic, recreational, and educational groups  Description  Interventions:  - Provide therapeutic and educational activities daily, encourage attendance and participation, and document same in the medical record     CERTIFIED PEER SPECIALIST INTERVENTIONS:    Complete peer assessment with patient to assess their needs and identify their goals to complete while in the recovery program as well as once discharged into the community  Patient will complete WRAP Plan, Crisis Plan and 5 Life Domains  Patient will attend 50% of groups offered on the unit  Patient will complete a goal card weekly  Outcome: Not Progressing     Problem: Ineffective Coping  Goal: Participates in unit activities  Description  Interventions:  - Provide therapeutic environment   - Provide required programming   - Redirect inappropriate behaviors   Outcome: Not Progressing     Problem: Risk for Self Injury/Neglect  Goal: Attend and participate in unit activities, including therapeutic, recreational, and educational groups  Description  Interventions:  - Provide therapeutic and educational activities daily, encourage attendance and participation, and document same in the medical record  - Obtain collateral information, encourage visitation and family involvement in care   Outcome: Not Progressing     Problem: Depression  Goal: Treatment Goal: Demonstrate behavioral control of depressive symptoms, verbalize feelings of improved mood/affect, and adopt new coping skills prior to discharge  Outcome: Not Progressing  Goal: Refrain from isolation  Description  Interventions:  - Develop a trusting relationship   - Encourage socialization   Outcome: Not Progressing  The patient has been isolative to her bed and has not attended any of the offered groups  Came out for morning meds and breakfast only  At 1130 patient reported feeling anxious "like a panic attack"  Asked to check what she had ordered for anxiety   "Maybe Ativan or Clonopin, if not, don't worry about it"  Checked and followed up with patient, told her that she only has Zyprexa ordered  Replied "Oh, I don't like that, I don't like the way it makes me feel"  Advised her to discuss possible PRN medication for anxiety with her psychiatrist and she said she will  Pointed out to patient that she appeared to be calm at that time (observed lying in bed, speaking in relaxed tone, no outward signs of being anxious) and she agreed that she felt better now  Continue to monitor

## 2020-04-23 NOTE — PROGRESS NOTES
04/23/20 0800   Team Meeting   Meeting Type Daily Rounds   Team Members Present   Team Members Present Physician;Nurse;; Other (Discipline and Name)   Physician Team Member Dr Aaron Curtis Team Member Radha Nance, RN   Care Management Team Member Crecencio Meigs, MSW   Other (Discipline and Name) JER SotoW; Bharathi Ramirez, SHANIKA     Isolative which she attributes to her "heart condition " No groups  Poor appetite

## 2020-04-23 NOTE — TREATMENT TEAM
04/23/20 0900   Activity/Group Checklist   Group Community meeting  (Morning Walk )   Attendance Did not attend   Attendance Duration (min) 16-30   Affect/Mood IRMA     Patient declined

## 2020-04-23 NOTE — PLAN OF CARE

## 2020-04-23 NOTE — PLAN OF CARE
Problem: Alteration in Thoughts and Perception  Goal: Verbalize thoughts and feelings  Description  Interventions:  - Promote a nonjudgmental and trusting relationship with the patient through active listening and therapeutic communication  - Assess patient's level of functioning, behavior and potential for risk  - Engage patient in 1 on 1 interactions for a minimum of 15 minutes each session  - Encourage patient to express fears, feelings, frustrations, and discuss symptoms    - [place] patient to reality, help patient recognize reality-based thinking   - Administer medications as ordered and assess for potential side effects  - Provide the patient education related to the signs and symptoms of the illness and desired effects of prescribed medications  Outcome: Progressing  Goal: Agree to be compliant with medication regime, as prescribed and report medication side effects  Description  Interventions:  - Offer appropriate PRN medication and supervise ingestion; conduct aims, as needed   Outcome: Progressing  Goal: Attend and participate in unit activities, including therapeutic, recreational, and educational groups  Description  Interventions:  - Provide therapeutic and educational activities daily, encourage attendance and participation, and document same in the medical record     CERTIFIED PEER SPECIALIST INTERVENTIONS:    Complete peer assessment with patient to assess their needs and identify their goals to complete while in the recovery program as well as once discharged into the community  Patient will complete WRAP Plan, Crisis Plan and 5 Life Domains  Patient will attend 50% of groups offered on the unit  Patient will complete a goal card weekly      Outcome: Progressing  Goal: Complete daily ADLs, including personal hygiene independently, as able  Description  Interventions:  - Observe, teach, and assist patient with ADLS  - Monitor and promote a balance of rest/activity, with adequate nutrition and elimination   Outcome: Progressing     Problem: Alteration in Orientation  Goal: Interact with staff daily  Description  Interventions:  - Assess and re-assess patient's level of orientation  - Engage patient in 1 on 1 interactions, daily, for a minimum of 15 minutes   - Establish rapport/trust with patient   Outcome: Progressing     Problem: SAFETY ADULT  Goal: Patient will remain free of falls  Description  INTERVENTIONS:  - Assess patient frequently for physical needs  -  Identify cognitive and physical deficits and behaviors that affect risk of falls    -  [place] fall precautions as indicated by assessment   - Educate patient/family on patient safety including physical limitations  - Instruct patient to call for assistance with activity based on assessment  - Modify environment to reduce risk of injury  - Consider OT/PT consult to assist with strengthening/mobility  Outcome: Progressing     Problem: RESPIRATORY - ADULT  Goal: Achieves optimal ventilation and oxygenation  Description  INTERVENTIONS:  - Assess for changes in respiratory status  - Assess for changes in mentation and behavior  - Position to facilitate oxygenation and minimize respiratory effort  - Oxygen administration by appropriate delivery method based on oxygen saturation (per order) or ABGs  - Initiate smoking cessation education as indicated  - Encourage broncho-pulmonary hygiene including cough, deep breathe, Incentive Spirometry  - Assess the need for suctioning and aspirate as needed  - Assess and instruct to report SOB or any respiratory difficulty  - Respiratory Therapy support as indicated  Outcome: Progressing     Problem: Risk for Self Injury/Neglect  Goal: Attend and participate in unit activities, including therapeutic, recreational, and educational groups  Description  Interventions:  - Provide therapeutic and educational activities daily, encourage attendance and participation, and document same in the medical record  - Obtain collateral information, encourage visitation and family involvement in care   Outcome: Not Progressing     Problem: Depression  Goal: Refrain from isolation  Description  Interventions:  - Develop a trusting relationship   - Encourage socialization   Outcome: Not Progressing     Problem: Nutrition/Hydration-ADULT  Goal: Nutrient/Hydration intake appropriate for improving, restoring or maintaining nutritional needs  Description  Monitor and assess patient's nutrition/hydration status for malnutrition  Collaborate with interdisciplinary team and initiate plan and interventions as ordered  Monitor patient's weight and dietary intake as ordered or per policy  Utilize nutrition screening tool and intervene as necessary  Determine patient's food preferences and provide high-protein, high-caloric foods as appropriate  INTERVENTIONS:  - Monitor oral intake, urinary output, labs, and treatment plans  - Assess nutrition and hydration status and recommend course of action  - Evaluate amount of meals eaten  - Assist patient with eating if necessary   - Allow adequate time for meals  - Recommend/ encourage appropriate diets, oral nutritional supplements, and vitamin/mineral supplements  - Order, calculate, and assess calorie counts as needed  - Recommend, monitor, and adjust tube feedings and TPN/PPN based on assessed needs  - Assess need for intravenous fluids  - Provide specific nutrition/hydration education as appropriate  - Include patient/family/caregiver in decisions related to nutrition  Outcome: Not Julian Vivar has been in her room most of the shift  Laying in bed, on her right side, facing the wall  At times she will sit up on her bed and stare into the nurses station  No distress noted  Prompted for medication which she took without swallowing issue  Ate 50% of her meal and drank 100% of Ensure  She was a bit somatic when eating supper  She was telling [facility] that she was choking   No coughing or issue noted by staff  She wants someone to watch her eat  Sat in the dining room for a short period of time after eating  Social with peers when out of her room for meals/snack  No shower, but did have a BM  Did not attend women's group or evening group  Took HS medication  Ate snack  Oxygen saturation 91% prior to humidified oxygen 1 liter via nasal cannula applied  Continue to monitor  Precautions maintained

## 2020-04-23 NOTE — TREATMENT TEAM
Patient declined      04/23/20 1100   Activity/Group Checklist   Group   (IMR/Depression and Anxiety )   Attendance Did not attend   Attendance Duration (min) 46-60   Affect/Mood IRMA

## 2020-04-24 NOTE — PLAN OF CARE
Problem: Alteration in Thoughts and Perception  Goal: Agree to be compliant with medication regime, as prescribed and report medication side effects  Description  Interventions:  - Offer appropriate PRN medication and supervise ingestion; conduct aims, as needed   Outcome: Progressing     Problem: Risk for Self Injury/Neglect  Goal: Treatment Goal: Remain safe during length of stay, learn and adopt new coping skills, and be free of self-injurious ideation, impulses and acts at the time of discharge  Outcome: Progressing  Goal: Refrain from harming self  Description  Interventions:  - Monitor patient closely, per order  - Develop a trusting relationship  - Supervise medication ingestion, monitor effects and side effects   Outcome: Progressing     Problem: Depression  Goal: Refrain from isolation  Description  Interventions:  - Develop a trusting relationship   - Encourage socialization   Outcome: Progressing     Problem: Anxiety  Goal: Anxiety is at manageable level  Description  Interventions:  - Assess and monitor patient's anxiety level  - Monitor for signs and symptoms of anxiety both physical and emotional (heart palpitations, chest pain, shortness of breath, headaches, nausea, feeling jumpy, restlessness, irritable, apprehensive)  - Collaborate with interdisciplinary team and initiate plan and interventions as ordered    - Harris patient to unit/surroundings  - Explain treatment plan  - Encourage participation in care  - Encourage verbalization of concerns/fears  - Identify coping mechanisms  - Assist in developing anxiety-reducing skills  - Administer/offer alternative therapies  - Limit or eliminate stimulants  Outcome: Progressing     Problem: Alteration in Orientation  Goal: Interact with staff daily  Description  Interventions:  - Assess and re-assess patient's level of orientation  - Engage patient in 1 on 1 interactions, daily, for a minimum of 15 minutes   - Establish rapport/trust with patient Outcome: Progressing  Goal: Cooperate with recommended testing/procedures  Description  Interventions:  - Determine need for ancillary testing  - Observe for mental status changes  - Implement falls/precaution protocol   Outcome: Progressing     Problem: PAIN - ADULT  Goal: Verbalizes/displays adequate comfort level or baseline comfort level  Description  Interventions:  - Encourage patient to monitor pain and request assistance  - Assess pain using appropriate pain scale  - Administer analgesics based on type and severity of pain and evaluate response  - Implement non-pharmacological measures as appropriate and evaluate response  - Consider cultural and social influences on pain and pain management  - Notify physician/advanced practitioner if interventions unsuccessful or patient reports new pain  Outcome: Progressing     Problem: SAFETY ADULT  Goal: Patient will remain free of falls  Description  INTERVENTIONS:  - Assess patient frequently for physical needs  -  Identify cognitive and physical deficits and behaviors that affect risk of falls  -  [place] fall precautions as indicated by assessment   - Educate patient/family on patient safety including physical limitations  - Instruct patient to call for assistance with activity based on assessment  - Modify environment to reduce risk of injury  - Consider OT/PT consult to assist with strengthening/mobility  Outcome: Progressing     Problem: Depression  Goal: Treatment Goal: Demonstrate behavioral control of depressive symptoms, verbalize feelings of improved mood/affect, and adopt new coping skills prior to discharge  Outcome: Not Progressing     Problem: Nutrition/Hydration-ADULT  Goal: Nutrient/Hydration intake appropriate for improving, restoring or maintaining nutritional needs  Description  Monitor and assess patient's nutrition/hydration status for malnutrition  Collaborate with interdisciplinary team and initiate plan and interventions as ordered  Monitor patient's weight and dietary intake as ordered or per policy  Utilize nutrition screening tool and intervene as necessary  Determine patient's food preferences and provide high-protein, high-caloric foods as appropriate  INTERVENTIONS:  - Monitor oral intake, urinary output, labs, and treatment plans  - Assess nutrition and hydration status and recommend course of action  - Evaluate amount of meals eaten  - Assist patient with eating if necessary   - Allow adequate time for meals  - Recommend/ encourage appropriate diets, oral nutritional supplements, and vitamin/mineral supplements  - Order, calculate, and assess calorie counts as needed  - Recommend, monitor, and adjust tube feedings and TPN/PPN based on assessed needs  - Assess need for intravenous fluids  - Provide specific nutrition/hydration education as appropriate  - Include patient/family/caregiver in decisions related to nutrition  Outcome: Not Progressing  Mostly isolative to her bed in her room, did attempt to go to [facility] but did not stay  Remains delusional and somatically preoccupied  Ate poorly only eating 25% of breakfast and 10% of lunch  No other behaviors or issues noted  Med compliant  Compliant with allowing staff to obtain her blood for ordered labs  Continue to monitor

## 2020-04-24 NOTE — PROGRESS NOTES
04/24/20 1300   Activity/Group Checklist   Group Other (Comment)  (Relaxation/Goal Cards)   Attendance Did not attend  (Personally prompted, but declined)

## 2020-04-24 NOTE — PROGRESS NOTES
Psychiatry Progress Note [address] 60 y o  female MRN: [id number]  [facility] [place] [address] Encounter: [id number]  Code Status: Level 1 - Full Code    PCP: Catie Feliz PA-C    Date of Admission:  7/23/2019 1730   Date of Service:  04/24/20  Patient Active Problem List   Diagnosis    COPD with asthma ([facility]    Tobacco use disorder, continuous    Compression fracture of L4 lumbar vertebra    Ventral hernia    Acute on chronic respiratory failure with hypoxia ([facility] )    [facility], [facility]    Acquired hypothyroidism    Gastroesophageal reflux disease without esophagitis    Abnormal CT of the chest    Excessive cerumen in left ear canal    Lipoma of right upper extremity    Noncompliant with deep vein thrombosis (DVT) prophylaxis    At risk for aspiration    Ear ache    Tachycardia    Chest pain    Low HDL (under 40)     Diagnosis schizoaffective bipolar    Assessment   Overall Status:  Remains suspicious passive-aggressive psychosomatic and not attending groups or attending to ADLs   Certification Statement to demonstrate a period of stability by attending to ADLs and becoming less psychosomatic in attending more groups Acceptance by patient:  Accepting  [place] in recovery:  Living at another personal care home   Understanding of medications: Yes    Involved in reintegration process: On hold due to [address] in relationship with psychiatrist:  Trusting sometimes    Medication changes    None today    Non-pharmacological treatments   Continue with individual, group, milieu and occupational therapy using recovery principles and psycho-education about accepting illness and the need for treatment   Encourage to take showers twice a week and cooperate with treatment and adl skills as per her behav   Plan   Another therapeutic pass with sister now that she did well  with the assigned staff escort to the park but it is now on hold due to corana virus   Prepare for the [address] [place] [address] and encouraged to accept  rather than refuse it    Safety   Safety and communication plan established to target dynamic risk factors discussed above  Discharge Plan   · back to a [facility]    Interval Progress   Patient remains psychosomatic claiming that she is choking and that she is sick or anxious despite appearing come and relaxed and not in any visible distress or any up with any apparent signs of choking on food  She has not been attending groups despite knowing that she has to attend minimum 25% of groups  She has met with the therapist yesterday and had told her that she is going to start with a clean slate and start attending groups as of today  He is poorly groomed disheveled unkempt sitting on her bed most of the time judging everyone and being suspicious of staff examining the pills before she takes them and accusing them of tampering with her medications oxygen concentrator or spiral meter etc   She is also accusing the  of sleeping with the  from the [facility]! She knows that she has no other choice other than go to [facility] care home or go to the [facility] beds at the [facility] hospital and she is now beginning to accept going to the personal care instead  She still appears to harbor some underlying paranoia  Group attendance:Poor  Sleep:   Fair  Appetite:   Fair but she picks and chooses her food but without any significant weight loss noted  Compliance with medications:  Good  Side effects:   none but claims to feel tired sometimes  Review of systems:  Continues to have psychosomatic symptoms     Mental Status Exam  Appearance:  Appears older than her stated age, for groomed and not very well kept and  wearing same clothing found laying on bed but readily got when approached  Anxious preoccupied  Has good eye contact friendly and pleasant today    Behavior:  Superficially friendly pleasant but anxious suspicious  Speech:  Delayed with increased latency of production and tangential at times with tendency to become stilted   Mood:  Euphoric anxious irritated,    Affect: increased in intensity range mood congruent redirectable  Thought Process:  Circumstantial with tendency to have stilted speech logical pursue rate to  Thought Content:  Continues to have some paranoia about motives of staff switching her medications or tampering with her inhalant or her oxygen concentrator off and on  Accusing  sleeping with the  at the [facility] ! She also has to examine her pills literally before taking them  No preoccupation with violence or suicide  No current suicidal homicidal thoughts intent or plans reported  No phobias but has obsessions and compulsions about examining her pills before she takes them  Enoch Gagnon Psychosomatic about dying by choking from food and from heart attack  Perceptual Disturbances:  None reported   Risk Potential:  Ability to care for herself and refusal to take showers  Sensorium: fully oriented to place, person, time, date, day, month, year and to situation  Cognition:  Grossly intact, aware of current events like the corona virus situation, recent and remote memory intact    No language deficit  Consciousness:  Alert and awake  Attention:  fair  Intellect:  average  Insight:  limited  Judgment:  fair  Motor Activity: No abnormal involuntary movement noted toda    Vitals  Temp:  [96 7 °F (35 9 °C)-97 3 °F (36 3 °C)] 96 7 °F (35 9 °C)  HR:  [73-79] 79  Resp:  [16-18] 18  BP: ()/(60-70) 99/70  SpO2:  [92 %-93 %] 92 %  No intake or output data in the 24 hours ending 04/24/20 0809    Lab Results: No New Labs Available For Today  Results from last 7 days   Lab Units 04/20/20  0556   SODIUM mmol/L 139   POTASSIUM mmol/L 3 9   CHLORIDE mmol/L 104   CO2 mmol/L 29   ANION GAP mmol/L 6   BUN mg/dL 18   CREATININE mg/dL 0 69   GLUCOSE RANDOM mg/dL 88   GLUCOSE FASTING mg/dL 88   CALCIUM mg/dL 9 2   EGFR ml/min/1 73sq m 94         Current Facility-Administered Medications:  acetaminophen 325 mg Oral Q6H PRBJORN Daniels MD   acetaminophen 650 mg Oral Q6H PRBJORN Daniels MD   acetaminophen 650 mg Oral Q8H PRBJORN Daniels MD   albuterol 2 puff Inhalation Q4H PRBJORN Daniels MD   aluminum-magnesium hydroxide-simethicone 15 mL Oral Q4H PRN Faheem Daniels MD   ammonium lactate 1 application Topical BID PRBJORN Daniels MD   benzonatate 100 mg Oral TID PRBJORN Daniels MD   benztropine 1 mg Intramuscular Q8H PRBJORN Daniels MD   carbamide peroxide 5 drop Left Ear BID PRBJORN Daniels MD   cloZAPine 25 mg Oral BID Faheem Daniels MD   cloZAPine 50 mg Oral BID Faheem Daniels MD   docusate sodium 100 mg Oral BID PRBJORN Daniels MD   EPINEPHrine PF 0 15 mg Intramuscular Once PRN Faheem Daniels MD   fluticasone-vilanterol 1 puff Inhalation Daily Faheem Daniels MD   ketotifen 1 drop Right Eye BID PRBJORN Daniels MD   levothyroxine 125 mcg Oral Early Morning Faheem Daniels MD   magnesium hydroxide 30 mL Oral Daily PRBJORN Daniels MD   montelukast 10 mg Oral HS Faheem Daniels MD   OLANZapine 5 mg Intramuscular Q8H PRBJORN Daniels MD   OLANZapine 5 mg Oral Q8H PRBJORN Daniels MD   ondansetron 4 mg Oral Q6H PRBJORN Daniels MD   pantoprazole 40 mg Oral Early Morning Faheem Daniels MD   polyethylene glycol 17 g Oral Daily PRN Faheem Daniels MD   polyvinyl alcohol 1 drop Both Eyes Q3H PRBJORN Daniels MD   sertraline 175 mg Oral Daily Faheem Daniels MD   sucralfate 1,000 mg Oral BID Faheem Daniels MD   theophylline 200 mg Oral Daily Faheem Daniels MD   tiotropium 18 mcg Inhalation Daily Faheem Daniels MD   traZODone 25 mg Oral HS PRN Faheem Daniels MD       Counseling / Coordination of Care: Total floor / unit time spent today 15 minutes   Greater than 50% of total time was spent with the patient and / or family counseling and / or somewhat receptive to supportive listening and teaching positive coping skills to deal with symptom tarun  Patient's Rights, confidentiality and exceptions to confidentiality, use of automated medical record, [address] staff access to medical record, and consent to treatment reviewed

## 2020-04-24 NOTE — PROGRESS NOTES
04/24/20 0951   Team Meeting   Meeting Type Daily Rounds   Team Members Present   Team Members Present Physician;Nurse;; Other (Discipline and Name)   Physician Team Member Dr Peter Anna Team Member Osmin Crawford, RN   Care Management Team Member ASHLYN Powers   Other (Discipline and Name) Rolm Mohs, LCSW     Poor appetite  Preoccupied with "heart condition"  Continues with somatic complaints  No groups  Slept

## 2020-04-24 NOTE — PROGRESS NOTES
VS    96 7   79   18   99/70     [email or web address]  ~Maggie maintained on ongoing fall and SAFE precaution   Laying in bed with eyes closed, breath even and unlabored   On O2 with humidifier @1L/m via nasal cannula  Continues rounding implemented   No somatic complaint overnight  No PRN needed for sleep aid   No indication of pain or discomfort   In no distress   Will continue to monitor   ~Unable to obtain scheduled lab CBC w/Diff this morning after 2 failed attempts  Jd Waggoner is encouraged to increase her water intake

## 2020-04-24 NOTE — PROGRESS NOTES
04/24/20 1100   Activity/Group Checklist   Group Other (Comment)  (IMR - Music Appreciation)   Attendance Attended   Attendance Duration (min) 46-60   Interactions Did not interact   Affect/Mood Appropriate   Goals Achieved Able to self-disclose; Able to give feedback to another;Able to experience relief/decrease in symptoms; Able to listen to others; Able to engage in interactions;Verbalized increased hopefulness

## 2020-04-24 NOTE — PLAN OF CARE
Problem: DISCHARGE PLANNING  Goal: Discharge to home or other facility with appropriate resources  Description  INTERVENTIONS:  - Conduct assessment to determine patient/family and health care team treatment goals, and need for post-acute services based on payer coverage, community resources, and patient preferences, and barriers to discharge  - Address psychosocial, clinical, and financial barriers to discharge as identified in assessment in conjunction with the patient/family and health care team  - Assist the patient in reintegration back into the community by removing barriers which may hinder a successful discharge once deemed stable  - Arrange appropriate level of post-acute services according to patient's needs and preference and payer coverage in collaboration with the physician and health care team  - Communicate with and update the patient/family, physician, and health care team regarding progress on the discharge plan  - Arrange appropriate transportation to post-acute venues    Outcome: Progressing     Patient continues to be isolative, poor ADLs, and somatic  Patient currently in [place]  Plan to hopefully d/c to [facility] with patients compliance  No d/c date as of now  CM will continue to follow and provide services as needed

## 2020-04-25 NOTE — PLAN OF CARE
Problem: Alteration in Thoughts and Perception  Goal: Verbalize thoughts and feelings  Description  Interventions:  - Promote a nonjudgmental and trusting relationship with the patient through active listening and therapeutic communication  - Assess patient's level of functioning, behavior and potential for risk  - Engage patient in 1 on 1 interactions for a minimum of 15 minutes each session  - Encourage patient to express fears, feelings, frustrations, and discuss symptoms    - [place] patient to reality, help patient recognize reality-based thinking   - Administer medications as ordered and assess for potential side effects  - Provide the patient education related to the signs and symptoms of the illness and desired effects of prescribed medications  Outcome: Progressing  Goal: Agree to be compliant with medication regime, as prescribed and report medication side effects  Description  Interventions:  - Offer appropriate PRN medication and supervise ingestion; conduct aims, as needed   Outcome: Progressing     Problem: Alteration in Orientation  Goal: Interact with staff daily  Description  Interventions:  - Assess and re-assess patient's level of orientation  - Engage patient in 1 on 1 interactions, daily, for a minimum of 15 minutes   - Establish rapport/trust with patient   Outcome: Progressing     Problem: SAFETY ADULT  Goal: Patient will remain free of falls  Description  INTERVENTIONS:  - Assess patient frequently for physical needs  -  Identify cognitive and physical deficits and behaviors that affect risk of falls    -  [place] fall precautions as indicated by assessment   - Educate patient/family on patient safety including physical limitations  - Instruct patient to call for assistance with activity based on assessment  - Modify environment to reduce risk of injury  - Consider OT/PT consult to assist with strengthening/mobility  Outcome: Progressing     Problem: Alteration in Thoughts and Perception  Goal: Attend and participate in unit activities, including therapeutic, recreational, and educational groups  Description  Interventions:  - Provide therapeutic and educational activities daily, encourage attendance and participation, and document same in the medical record     CERTIFIED PEER SPECIALIST INTERVENTIONS:    Complete peer assessment with patient to assess their needs and identify their goals to complete while in the recovery program as well as once discharged into the community  Patient will complete WRAP Plan, Crisis Plan and 5 Life Domains  Patient will attend 50% of groups offered on the unit  Patient will complete a goal card weekly  Outcome: Not Progressing  Goal: Complete daily ADLs, including personal hygiene independently, as able  Description  Interventions:  - Observe, teach, and assist patient with ADLS  - Monitor and promote a balance of rest/activity, with adequate nutrition and elimination   Outcome: Not Progressing     Problem: Depression  Goal: Refrain from isolation  Description  Interventions:  - Develop a trusting relationship   - Encourage socialization   Outcome: Not Mio Holt has been in her room most of the day  Lays in bed on her right side facing the wall  Napping at times  Disheveled appearance  Stated this morning that she "gets dizzy when getting up"  Encouraged to sit before standing and not to lay in bed all day  Fluids also encouraged  "I am urinating a lot"  Denies burning with voiding  Took medications without difficulty  Ate 10% of breakfast and 50% of lunch  Attended community meeting group  No shower or BM today  Continue to monitor  Precautions maintained

## 2020-04-25 NOTE — PLAN OF CARE
Problem: Alteration in Thoughts and Perception  Goal: Agree to be compliant with medication regime, as prescribed and report medication side effects  Description  Interventions:  - Offer appropriate PRN medication and supervise ingestion; conduct aims, as needed   Outcome: Progressing     Problem: Nutrition/Hydration-ADULT  Goal: Nutrient/Hydration intake appropriate for improving, restoring or maintaining nutritional needs  Description  Monitor and assess patient's nutrition/hydration status for malnutrition  Collaborate with interdisciplinary team and initiate plan and interventions as ordered  Monitor patient's weight and dietary intake as ordered or per policy  Utilize nutrition screening tool and intervene as necessary  Determine patient's food preferences and provide high-protein, high-caloric foods as appropriate       INTERVENTIONS:  - Monitor oral intake, urinary output, labs, and treatment plans  - Assess nutrition and hydration status and recommend course of action  - Evaluate amount of meals eaten  - Assist patient with eating if necessary   - Allow adequate time for meals  - Recommend/ encourage appropriate diets, oral nutritional supplements, and vitamin/mineral supplements  - Order, calculate, and assess calorie counts as needed  - Recommend, monitor, and adjust tube feedings and TPN/PPN based on assessed needs  - Assess need for intravenous fluids  - Provide specific nutrition/hydration education as appropriate  - Include patient/family/caregiver in decisions related to nutrition  Outcome: Progressing     Problem: Alteration in Thoughts and Perception  Goal: Attend and participate in unit activities, including therapeutic, recreational, and educational groups  Description  Interventions:  - Provide therapeutic and educational activities daily, encourage attendance and participation, and document same in the medical record     [address] :    Complete peer assessment with patient to assess their needs and identify their goals to complete while in the recovery program as well as once discharged into the community  Patient will complete WRAP Plan, Crisis Plan and 5 Life Domains  Patient will attend 50% of groups offered on the unit  Patient will complete a goal card weekly  Outcome: Not Progressing  Goal: Complete daily ADLs, including personal hygiene independently, as able  Description  Interventions:  - Observe, teach, and assist patient with ADLS  - Monitor and promote a balance of rest/activity, with adequate nutrition and elimination   Outcome: Not Progressing     Problem: Depression  Goal: Refrain from isolation  Description  Interventions:  - Develop a trusting relationship   - Encourage socialization   Outcome: Not Progressing     [address] is isolative to her room crumpled in bed asleep  She continues low energy, poorly motivated to do things of which she is capable (hygiene, come to kitchen for water Q1H, get up & walk about even in just room)  She did leave room for supper medicine, did eat 75% of meal plus an Ensure  She has not responded to invitations to two optional Movie activities

## 2020-04-25 NOTE — PROGRESS NOTES
[address] reluctantly performed the Incentive Spirometry, but, did well achieving a consistent 1250ml  She had an HS snack, came up for HS medicine except the Singulair  Wearing now her QHS humidified nasal O2 @ 1L for bed

## 2020-04-25 NOTE — PROGRESS NOTES
VS  98 1   18   95   105/80  93%RA  Maggie maintained on ongoing fall and SAFE precaution   Laying in bed with eyes closed, breath even and unlabored   On O2 with humidifier @1L/m via nasal cannula  Continues rounding implemented   No somatic complaint overnight  No PRN needed for sleep aid   No indication of pain or discomfort   In no distress   Will continue to monitor

## 2020-04-25 NOTE — PROGRESS NOTES
Psychiatry Progress Note    Subjective: Interval History     The patient is lying in bed this morning  She is somatic  Staff reports she is worried about her blood sugar  Patient also reports swallowing issues  Patient needed encouragement to get up for breakfast   Staff reports yesterday she ate 25, 10, and 0% of meals  She did have Ensure and a snack  Patient states she is sleeping well at night and all she wants to do is sleep  She reports depression and anxiety  She denies suicidal ideation  She denies hallucinations      Behavior over the last 24 hours:  unchanged  Sleep: normal  Appetite: poor  Medication side effects: No  ROS: no complaints    Current medications:    Current Facility-Administered Medications:     acetaminophen (TYLENOL) tablet 325 mg, 325 mg, Oral, Q6H PRN, Carsisa Willis MD    acetaminophen (TYLENOL) tablet 650 mg, 650 mg, Oral, Q6H PRN, Carissa Willis MD    acetaminophen (TYLENOL) tablet 650 mg, 650 mg, Oral, Q8H PRN, Carissa Willis MD    albuterol (PROVENTIL HFA,VENTOLIN HFA) inhaler 2 puff, 2 puff, Inhalation, Q4H PRN, Carissa iWllis MD, 2 puff at 10/11/19 0424    aluminum-magnesium hydroxide-simethicone (MYLANTA) 200-200-20 mg/5 mL oral suspension 15 mL, 15 mL, Oral, Q4H PRN, Carissa Willis MD, 15 mL at 01/26/20 1021    ammonium lactate (LAC-HYDRIN) 12 % lotion 1 application, 1 application, Topical, BID PRN, Carissa Willis MD    benzonatate (TESSALON PERLES) capsule 100 mg, 100 mg, Oral, TID PRN, Carissa Willis MD    benztropine (COGENTIN) injection 1 mg, 1 mg, Intramuscular, Q8H PRN, Carissa Willis MD    carbamide peroxide [facility]) 6 5 % otic solution 5 drop, 5 drop, Left Ear, BID PRN, Carissa Willis MD, 5 drop at 04/15/20 2128    cloZAPine (CLOZARIL) tablet 25 mg, 25 mg, Oral, BID, Carissa Willis MD, 25 mg at 04/24/20 2128    cloZAPine (CLOZARIL) tablet 50 mg, 50 mg, Oral, BID, Carissa Willis MD, 50 mg at 04/24/20 2128    docusate sodium (COLACE) capsule 100 mg, 100 mg, Oral, BID PRN, Meredith Wesley MD    EPINEPHrine PF (ADRENALIN) 1 mg/mL injection 0 15 mg, 0 15 mg, Intramuscular, Once PRN, Meredith Wesley MD    fluticasone-vilanterol (BREO ELLIPTA) 200-25 MCG/INH inhaler 1 puff, 1 puff, Inhalation, Daily, Meredith Wesley MD, 1 puff at 04/25/20 0849    ketotifen (ZADITOR) 0 025 % ophthalmic solution 1 drop, 1 drop, Right Eye, BID PRN, Meredith Wesley MD    levothyroxine tablet 125 mcg, 125 mcg, Oral, Early Morning, Meredith Wesley MD, 125 mcg at 04/25/20 0609    magnesium hydroxide (MILK OF MAGNESIA) 400 mg/5 mL oral suspension 30 mL, 30 mL, Oral, Daily PRN, Meredith Wesley MD    montelukast (SINGULAIR) tablet 10 mg, 10 mg, Oral, HS, Meredith Wesley MD, 10 mg at 02/22/20 2104    OLANZapine (ZyPREXA) IM injection 5 mg, 5 mg, Intramuscular, Q8H PRN, Meredith Wesley MD    OLANZapine (ZyPREXA) tablet 5 mg, 5 mg, Oral, Q8H PRN, Meredith Wesley MD    ondansetron (ZOFRAN-ODT) dispersible tablet 4 mg, 4 mg, Oral, Q6H PRN, Meredith Wesley MD, 4 mg at 12/09/19 1757    pantoprazole (PROTONIX) EC tablet 40 mg, 40 mg, Oral, Early Morning, Meredith Wesley MD, 40 mg at 04/25/20 0609    polyethylene glycol (MIRALAX) packet 17 g, 17 g, Oral, Daily PRN, Meredith Wesley MD    polyvinyl alcohol (LIQUIFILM TEARS) 1 4 % ophthalmic solution 1 drop, 1 drop, Both Eyes, Q3H PRN, Meredith Wesley MD    sertraline (ZOLOFT) tablet 175 mg, 175 mg, Oral, Daily, Meredith Wesley MD, 175 mg at 04/25/20 3219    sucralfate (CARAFATE) oral suspension 1,000 mg, 1,000 mg, Oral, BID, Meredith Wesley MD, 1,000 mg at 04/25/20 0609    theophylline (JEF-24) 24 hr capsule 200 mg, 200 mg, Oral, Daily, Meredith Wesley MD, 200 mg at 04/25/20 2484    tiotropium [facility]) capsule for inhaler 18 mcg, 18 mcg, Inhalation, Daily, Meredith Wesley MD, 18 mcg at 04/25/20 0849    traZODone (DESYREL) tablet 25 mg, 25 mg, Oral, HS PRN, Meredith Wesley MD    Current Problem List:    Patient Active Problem List   Diagnosis    COPD with asthma ([facility] [address] [facility] )    Tobacco use disorder, continuous    Compression fracture of L4 lumbar vertebra    Ventral hernia    Acute on chronic respiratory failure with hypoxia (HCC)    Schizoaffective disorder, bipolar type (HCC)    Acquired hypothyroidism    Gastroesophageal reflux disease without esophagitis    Abnormal CT of the chest    Excessive cerumen in left ear canal    Lipoma of right upper extremity    Noncompliant with deep vein thrombosis (DVT) prophylaxis    At risk for aspiration    Ear ache    Tachycardia    Chest pain    Low HDL (under 40)       Problem list reviewed 04/25/20     Objective:     Vital Signs:  Vitals:    04/24/20 0700 04/24/20 1955 04/25/20 0500 04/25/20 0700   BP: 131/66 104/82 105/80 90/52   BP Location: Left arm Left arm Left arm Right arm   Pulse: 92 101 95 66   Resp: 18 16 18 19   Temp: (!) 97 °F (36 1 °C) 97 7 °F (36 5 °C) 98 1 °F (36 7 °C) (!) 97 1 °F (36 2 °C)   TempSrc: Temporal Temporal Temporal Temporal   SpO2:  93% 93%    Weight:       Height:             Appearance:  age appropriate and casually dressed   Behavior:  normal   Speech:  delayed   Mood:  irritable   Affect:  normal   Thought Process:  circumstantial   Thought Content:  delusions  persecutory   Perceptual Disturbances: None   Risk Potential: none   Sensorium:  person, place, situation and time   Cognition:  intact   Consciousness:  alert and awake    Attention: attention span and concentration were age appropriate   Intellect: average   Insight:  limited   Judgment: limited      Motor Activity: no abnormal movements       I/O Past 24 hours:  No intake/output data recorded  No intake/output data recorded  Labs:  Reviewed 04/25/20      Assessment / Plan:     [facility]ffective disorder, bipolar type ([facility] )    Recommended Treatment:      Medication changes:  1) continue current medication regimen  Non-pharmacological treatments  1) Continue with group therapy, milieu therapy and occupational therapy      Safety  1) Safety/communication plan established targeting dynamic risk factors above  2) Risks, benefits, and possible side effects of medications explained to patient and patient verbalizes understanding  Counseling / Coordination of Care    Total floor / unit time spent today 20 minutes  Greater than 50% of total time was spent with the patient and / or family counseling and / or coordination of care  A description of the counseling / coordination of care  Patient's Rights, confidentiality and exceptions to confidentiality, use of automated medical record, [facility] staff access to medical record, and consent to treatment reviewed      Jenaro Kaur PA-C

## 2020-04-25 NOTE — PLAN OF CARE
Problem: Alteration in Thoughts and Perception  Goal: Agree to be compliant with medication regime, as prescribed and report medication side effects  Description  Interventions:  - Offer appropriate PRN medication and supervise ingestion; conduct aims, as needed   Outcome: Progressing     Problem: Alteration in Thoughts and Perception  Goal: Attend and participate in unit activities, including therapeutic, recreational, and educational groups  Description  Interventions:  - Provide therapeutic and educational activities daily, encourage attendance and participation, and document same in the medical record     CERTIFIED PEER SPECIALIST INTERVENTIONS:    Complete peer assessment with patient to assess their needs and identify their goals to complete while in the recovery program as well as once discharged into the community  Patient will complete WRAP Plan, Crisis Plan and 5 Life Domains  Patient will attend 50% of groups offered on the unit  Patient will complete a goal card weekly  Outcome: Not Progressing  Goal: Complete daily ADLs, including personal hygiene independently, as able  Description  Interventions:  - Observe, teach, and assist patient with ADLS  - Monitor and promote a balance of rest/activity, with adequate nutrition and elimination   Outcome: Not Progressing     Problem: Depression  Goal: Refrain from isolation  Description  Interventions:  - Develop a trusting relationship   - Encourage socialization   Outcome: Not Progressing     Problem: Nutrition/Hydration-ADULT  Goal: Nutrient/Hydration intake appropriate for improving, restoring or maintaining nutritional needs  Description  Monitor and assess patient's nutrition/hydration status for malnutrition  Collaborate with interdisciplinary team and initiate plan and interventions as ordered  Monitor patient's weight and dietary intake as ordered or per policy  Utilize nutrition screening tool and intervene as necessary   Determine patient's food preferences and provide high-protein, high-caloric foods as appropriate  INTERVENTIONS:  - Monitor oral intake, urinary output, labs, and treatment plans  - Assess nutrition and hydration status and recommend course of action  - Evaluate amount of meals eaten  - Assist patient with eating if necessary   - Allow adequate time for meals  - Recommend/ encourage appropriate diets, oral nutritional supplements, and vitamin/mineral supplements  - Order, calculate, and assess calorie counts as needed  - Recommend, monitor, and adjust tube feedings and TPN/PPN based on assessed needs  - Assess need for intravenous fluids  - Provide specific nutrition/hydration education as appropriate  - Include patient/family/caregiver in decisions related to nutrition  Outcome: Jannette Donato Paz has been isolative to her room & bed in a crumpled heap asleep  When staff awakened her, she came out for supper medicine  Refused meal saying she did not like what was being served  Did drink her Ensure  She is unkempt & shows no interest in doing hygiene, no shower in 3 days  She is low energy, poorly motivated   Did not respond to invitation to [id number]

## 2020-04-26 NOTE — PROGRESS NOTES
Sleeping at this time, no issues noted  O2 on at 1L via NC and no s/s of resp distress  All precautions in place and maintained at this time   Monitoring continues

## 2020-04-26 NOTE — PROGRESS NOTES
[address] is feeling peaked tonight; has c/o nausea, feeling dizzy, weak & wanting to stay in bed  VS stable  Not receptive to teaching that laying around just breeds weakness/lack of energy, making her feel worse  Says wants to shower tomorrow if feels better  Declined Incentive Spirometer tonight, declined HS snack  Wearing now her QHS humidified nasal O2 @ 1L for bed

## 2020-04-26 NOTE — PLAN OF CARE
Problem: Alteration in Thoughts and Perception  Goal: Verbalize thoughts and feelings  Description  Interventions:  - Promote a nonjudgmental and trusting relationship with the patient through active listening and therapeutic communication  - Assess patient's level of functioning, behavior and potential for risk  - Engage patient in 1 on 1 interactions for a minimum of 15 minutes each session  - Encourage patient to express fears, feelings, frustrations, and discuss symptoms    - [place] patient to reality, help patient recognize reality-based thinking   - Administer medications as ordered and assess for potential side effects  - Provide the patient education related to the signs and symptoms of the illness and desired effects of prescribed medications  Outcome: Progressing  Goal: Agree to be compliant with medication regime, as prescribed and report medication side effects  Description  Interventions:  - Offer appropriate PRN medication and supervise ingestion; conduct aims, as needed   Outcome: Progressing     Problem: Alteration in Orientation  Goal: Interact with staff daily  Description  Interventions:  - Assess and re-assess patient's level of orientation  - Engage patient in 1 on 1 interactions, daily, for a minimum of 15 minutes   - Establish rapport/trust with patient   Outcome: Progressing     Problem: SAFETY ADULT  Goal: Patient will remain free of falls  Description  INTERVENTIONS:  - Assess patient frequently for physical needs  -  Identify cognitive and physical deficits and behaviors that affect risk of falls    -  [place] fall precautions as indicated by assessment   - Educate patient/family on patient safety including physical limitations  - Instruct patient to call for assistance with activity based on assessment  - Modify environment to reduce risk of injury  - Consider OT/PT consult to assist with strengthening/mobility  Outcome: Progressing     Problem: Alteration in Thoughts and Perception  Goal: Attend and participate in unit activities, including therapeutic, recreational, and educational groups  Description  Interventions:  - Provide therapeutic and educational activities daily, encourage attendance and participation, and document same in the medical record     CERTIFIED PEER SPECIALIST INTERVENTIONS:    Complete peer assessment with patient to assess their needs and identify their goals to complete while in the recovery program as well as once discharged into the community  Patient will complete WRAP Plan, Crisis Plan and 5 Life Domains  Patient will attend 50% of groups offered on the unit  Patient will complete a goal card weekly  Outcome: Not Progressing  Goal: Complete daily ADLs, including personal hygiene independently, as able  Description  Interventions:  - Observe, teach, and assist patient with ADLS  - Monitor and promote a balance of rest/activity, with adequate nutrition and elimination   Outcome: Not Collette Kar was in bed most of the shift  Napping on and off  Interacts with peers when out for meals  Pleasant and cooperative when approached by staff  Took medication without an issue  Swallowed all pills  Ate 50% of her meal for breakfast and 10% for lunch  No shower or BM today  Disheveled appearance  Has been wearing the same clothing for days  Has not been somatic  Did not attend any groups  Continue to monitor  Precautions maintained

## 2020-04-26 NOTE — PROGRESS NOTES
Psychiatry Progress Note    Subjective: Interval History     The patient is lying in bed this morning  She continues to be somatic  This morning patient states that she believes her electrolytes are off and that is why she feels tired  Staff reports she has been staying in bed and needs encouragement to come out of her room  She has been compliant with medication  She has a fair appetite  She states she does not like some of the food  Patient is denying feeling anxious  She does report some depression  She denies suicidal ideation or hallucinations      Behavior over the last 24 hours:  unchanged  Sleep: normal  Appetite: poor  Medication side effects: No  ROS: no complaints    Current medications:    Current Facility-Administered Medications:     acetaminophen (TYLENOL) tablet 325 mg, 325 mg, Oral, Q6H PRN, Tree Madden MD    acetaminophen (TYLENOL) tablet 650 mg, 650 mg, Oral, Q6H PRN, Tree Madden MD    acetaminophen (TYLENOL) tablet 650 mg, 650 mg, Oral, Q8H PRN, Tree Madden MD    albuterol (PROVENTIL HFA,VENTOLIN HFA) inhaler 2 puff, 2 puff, Inhalation, Q4H PRN, Tree Madden MD, 2 puff at 10/11/19 0424    aluminum-magnesium hydroxide-simethicone (MYLANTA) 200-200-20 mg/5 mL oral suspension 15 mL, 15 mL, Oral, Q4H PRN, Tree Madden MD, 15 mL at 01/26/20 1021    ammonium lactate (LAC-HYDRIN) 12 % lotion 1 application, 1 application, Topical, BID PRN, Tree Madden MD    benzonatate (TESSALON PERLES) capsule 100 mg, 100 mg, Oral, TID PRN, Tree Madden MD    benztropine (COGENTIN) injection 1 mg, 1 mg, Intramuscular, Q8H PRN, Tree Madden MD    carbamide peroxide [facility]) 6 5 % otic solution 5 drop, 5 drop, Left Ear, BID PRN, Tree Madden MD, 5 drop at 04/15/20 2128    cloZAPine (CLOZARIL) tablet 25 mg, 25 mg, Oral, BID, Tree Madden MD, 25 mg at 04/25/20 2108    cloZAPine (CLOZARIL) tablet 50 mg, 50 mg, Oral, BID, Tree Madden MD, 50 mg at 04/25/20 2108    docusate sodium (COLACE) capsule 100 mg, 100 mg, Oral, BID PRN, Faheem Daniels MD    EPINEPHrine PF (ADRENALIN) 1 mg/mL injection 0 15 mg, 0 15 mg, Intramuscular, Once PRN, Faheem Daniels MD    fluticasone-vilanterol (BREO ELLIPTA) 200-25 MCG/INH inhaler 1 puff, 1 puff, Inhalation, Daily, Faheem Daniels MD, 1 puff at 04/26/20 0858    ketotifen (ZADITOR) 0 025 % ophthalmic solution 1 drop, 1 drop, Right Eye, BID PRN, Faheem Daniels MD    levothyroxine tablet 125 mcg, 125 mcg, Oral, Early Morning, Faheem Daniels MD, 125 mcg at 04/26/20 0605    magnesium hydroxide (MILK OF MAGNESIA) 400 mg/5 mL oral suspension 30 mL, 30 mL, Oral, Daily PRN, Faheem Daniels MD    montelukast (SINGULAIR) tablet 10 mg, 10 mg, Oral, HS, Faheem Daniels MD, 10 mg at 02/22/20 2104    OLANZapine (ZyPREXA) IM injection 5 mg, 5 mg, Intramuscular, Q8H PRN, Faheem Daniels MD    OLANZapine (ZyPREXA) tablet 5 mg, 5 mg, Oral, Q8H PRN, Faheem Daniels MD    ondansetron (ZOFRAN-ODT) dispersible tablet 4 mg, 4 mg, Oral, Q6H PRN, Faheem Daniels MD, 4 mg at 12/09/19 1757    pantoprazole (PROTONIX) EC tablet 40 mg, 40 mg, Oral, Early Morning, Faheem Daniels MD, 40 mg at 04/26/20 0605    polyethylene glycol (MIRALAX) packet 17 g, 17 g, Oral, Daily PRN, Faheem Daniels MD    polyvinyl alcohol (LIQUIFILM TEARS) 1 4 % ophthalmic solution 1 drop, 1 drop, Both Eyes, Q3H PRN, Faheem Daniels MD    sertraline (ZOLOFT) tablet 175 mg, 175 mg, Oral, Daily, Faheem Daniels MD, 175 mg at 04/26/20 0859    sucralfate (CARAFATE) oral suspension 1,000 mg, 1,000 mg, Oral, BID, Faheem Daniels MD, 1,000 mg at 04/26/20 0606    theophylline (JEF-24) 24 hr capsule 200 mg, 200 mg, Oral, Daily, Faheem Daniels MD, 200 mg at 04/26/20 0859    tiotropium (SPIRIVA) capsule for inhaler 18 mcg, 18 mcg, Inhalation, Daily, Faheem Daniels MD, 18 mcg at 04/26/20 0858    traZODone (DESYREL) tablet 25 mg, 25 mg, Oral, HS PRN, Faheem Daniels MD    Current Problem List:    Patient Active Problem List   Diagnosis    COPD with asthma ([facility] [address] )    Tobacco use disorder, continuous    Compression fracture of L4 lumbar vertebra    Ventral hernia    Acute on chronic respiratory failure with hypoxia (HCC)    Schizoaffective disorder, bipolar type (HCC)    Acquired hypothyroidism    Gastroesophageal reflux disease without esophagitis    Abnormal CT of the chest    Excessive cerumen in left ear canal    Lipoma of right upper extremity    Noncompliant with deep vein thrombosis (DVT) prophylaxis    At risk for aspiration    Ear ache    Tachycardia    Chest pain    Low HDL (under 40)       Problem list reviewed 04/26/20     Objective:     Vital Signs:  Vitals:    04/25/20 0500 04/25/20 0700 04/25/20 2010 04/26/20 0710   BP: 105/80 90/52 91/56 102/64   BP Location: Left arm Right arm Right arm    Pulse: 95 66 69 73   Resp: 18 19 18 16   Temp: 98 1 °F (36 7 °C) (!) 97 1 °F (36 2 °C) (!) 97 1 °F (36 2 °C) (!) 97 3 °F (36 3 °C)   TempSrc: Temporal Temporal Temporal Temporal   SpO2: 93%  94%    Weight:    65 4 kg (144 lb 3 2 oz)   Height:             Appearance:  age appropriate and casually dressed   Behavior:  normal   Speech:  delayed   Mood:  irritable   Affect:  normal   Thought Process:  circumstantial   Thought Content:  delusions  persecutory   Perceptual Disturbances: None   Risk Potential: none   Sensorium:  person, place, situation and time   Cognition:  intact   Consciousness:  alert and awake    Attention: attention span and concentration were age appropriate   Intellect: average   Insight:  limited   Judgment: limited      Motor Activity: no abnormal movements       I/O Past 24 hours:  No intake/output data recorded  No intake/output data recorded  Labs:  Reviewed 04/26/20      Assessment / Plan:     Schizoaffective disorder, bipolar type ([facility] )    Recommended Treatment:      Medication changes:  1) continue current medication regimen      Non-pharmacological treatments  1) Continue with group therapy, milieu therapy and occupational therapy  Safety  1) Safety/communication plan established targeting dynamic risk factors above  2) Risks, benefits, and possible side effects of medications explained to patient and patient verbalizes understanding  Counseling / Coordination of Care    Total floor / unit time spent today 20 minutes  Greater than 50% of total time was spent with the patient and / or family counseling and / or coordination of care  A description of the counseling / coordination of care  Patient's Rights, confidentiality and exceptions to confidentiality, use of automated medical record, [facility] staff access to medical record, and consent to treatment reviewed      Rozina Quiñones PA-C

## 2020-04-26 NOTE — PLAN OF CARE
Problem: Alteration in Thoughts and Perception  Goal: Verbalize thoughts and feelings  Description  Interventions:  - Promote a nonjudgmental and trusting relationship with the patient through active listening and therapeutic communication  - Assess patient's level of functioning, behavior and potential for risk  - Engage patient in 1 on 1 interactions for a minimum of 15 minutes each session  - Encourage patient to express fears, feelings, frustrations, and discuss symptoms    - [place] patient to reality, help patient recognize reality-based thinking   - Administer medications as ordered and assess for potential side effects  - Provide the patient education related to the signs and symptoms of the illness and desired effects of prescribed medications  Outcome: Progressing  Goal: Agree to be compliant with medication regime, as prescribed and report medication side effects  Description  Interventions:  - Offer appropriate PRN medication and supervise ingestion; conduct aims, as needed   Outcome: Progressing  Goal: Complete daily ADLs, including personal hygiene independently, as able  Description  Interventions:  - Observe, teach, and assist patient with ADLS  - Monitor and promote a balance of rest/activity, with adequate nutrition and elimination   Outcome: Progressing     Problem: Alteration in Thoughts and Perception  Goal: Attend and participate in unit activities, including therapeutic, recreational, and educational groups  Description  Interventions:  - Provide therapeutic and educational activities daily, encourage attendance and participation, and document same in the medical record     CERTIFIED PEER SPECIALIST INTERVENTIONS:    Complete peer assessment with patient to assess their needs and identify their goals to complete while in the recovery program as well as once discharged into the community  Patient will complete WRAP Plan, Crisis Plan and 5 Life Domains      Patient will attend 50% of groups offered on the unit  Patient will complete a goal card weekly  Outcome: Not Progressing     Problem: Nutrition/Hydration-ADULT  Goal: Nutrient/Hydration intake appropriate for improving, restoring or maintaining nutritional needs  Description  Monitor and assess patient's nutrition/hydration status for malnutrition  Collaborate with interdisciplinary team and initiate plan and interventions as ordered  Monitor patient's weight and dietary intake as ordered or per policy  Utilize nutrition screening tool and intervene as necessary  Determine patient's food preferences and provide high-protein, high-caloric foods as appropriate  INTERVENTIONS:  - Monitor oral intake, urinary output, labs, and treatment plans  - Assess nutrition and hydration status and recommend course of action  - Evaluate amount of meals eaten  - Assist patient with eating if necessary   - Allow adequate time for meals  - Recommend/ encourage appropriate diets, oral nutritional supplements, and vitamin/mineral supplements  - Order, calculate, and assess calorie counts as needed  - Recommend, monitor, and adjust tube feedings and TPN/PPN based on assessed needs  - Assess need for intravenous fluids  - Provide specific nutrition/hydration education as appropriate  - Include patient/family/caregiver in decisions related to nutrition  Outcome: Jannette Holm was initially going to refuse to shower/groom because says staff is no longer willing to help her  "I need help w/my back & my hair " Wondering @ this position  Explained to her staff is encouraging her to do for herself as much as possible as she is capable, is not an invalid & it is perplexing as to why she clings to this role  Had no response beyond saying expecting her to do for herself gives a misimpression that she needs no help & worries will then have none @ the [place]   Teaching done that making an effort to do such chores everyday will build endurance, strength, despite lung issue  MHT did set her up for shower, assisted her to scrub back/wash hair, but, she did the remainder of bathing, drying, dressing herself, again w/staff help to comb out wet hair, dry & braid it  She came out for supper medicine  Ate poorly @ meal, 10% (applesauce), but, had an Ensure  In bed since sleeping; says feels "relaxed" after shower  Has not responded to invitation to optional Movie feature

## 2020-04-27 NOTE — PROGRESS NOTES
Psychiatry Progress Note [address] 60 y o  female MRN: [id number]  Unit/Bed#: [facility] [place] [address] Encounter: [id number]  Code Status: Level 1 - Full Code    PCP: Donato Jackson PA-C    Date of Admission:  7/23/2019 1730   Date of Service:  04/27/20  Patient Active Problem List   Diagnosis    [facility] with asthma ([facility] )    Tobacco use disorder, continuous    Compression fracture of L4 lumbar vertebra    Ventral hernia    Acute on chronic respiratory failure with hypoxia ([facility] )    [facility], [facility]    Acquired hypothyroidism    Gastroesophageal reflux disease without esophagitis    Abnormal CT of the chest    Excessive cerumen in left ear canal    Lipoma of right upper extremity    Noncompliant with deep vein thrombosis (DVT) prophylaxis    At risk for aspiration    Ear ache    Tachycardia    Chest pain    Low HDL (under 40)     Diagnosis schizoaffective bipolar    Assessment   Overall Status: still psychosomatic and passive aggressive,reluctant to attend groups or attend to [address] to demonstrate a period of stability by attending to ADLs and becoming less psychosomatic in attending more groups Acceptance by patient:  Accepting  Fariba Randolph in recovery:  Living at another personal care home   Understanding of medications: Yes    Involved in reintegration process: On hold due to [address] in relationship with psychiatrist:  Trusting sometimes    Medication changes    None today    Non-pharmacological treatments   Continue with individual, group, milieu and occupational therapy using recovery principles and psycho-education about accepting illness and the need for treatment   Encourage to take showers twice a week and cooperate with treatment and adl skills as per her behav   Plan   Another therapeutic pass with sister now that she did well  with the assigned staff escort to the park but it is now on hold due to corana virus   Prepare for the [facility] and encouraged to accept  rather than refuse it    Safety   Safety and communication plan established to target dynamic risk factors discussed above  Discharge Plan   · back to a [facility] at [address]   Was reluctant initially to take showers but did over the week end with staf assistance, still fearful about dying form a heart attack or from SOB or from choking on food  Needs frequent support and reassuarance on the unit  Hygiens is fair today with better grooming  She only attended 12% of groups despite lowering expectation to only 25%  Still suspicious of some staff tampering with her medications, oxygen concentrator and spirometer  She understands that she has to go to the [facility] as an only alternative for her discharge outside a state hospital and hence she says she will try to achieve her goals for group attendance  Found on bed in her usual position but was well groomed and well kept today after having taken a shower this weekend    Group attendance:12% last week  Sleep:   Fair  Appetite:   [address] but she picks and chooses her food but without any significant weight loss noted  Compliance with medications:  Good  Side effects:   none but claims to feel tired sometimes  Review of systems:  Continues to have psychosomatic symptoms     Mental Status Exam  Appearance:  Appears older than her stated age, better groomed and  very well kept and  wearing clean clothing found laying on bed but readily got up when approached  Anxious preoccupied  Has good eye contact friendly and pleasant today    Behavior:  Superficially friendly pleasant but anxious suspicious  Speech:  Delayed with increased latency of production and tangential at times with tendency to become stilted   Mood:  Euphoric anxious irritated times,    Affect: increased in intensity range mood congruent redirectable  Thought Process:  Circumstantial with tendency to have stilted speech logical pursue rate to  Thought Content:  Continues to have some paranoia about motives of staff switching her medications or tampering with her inhalant or her oxygen concentrator off and on  Accusing  sleeping with the  at the [facility] ! She also has to examine her pills literally before taking them  No preoccupation with violence or suicide  No current suicidal homicidal thoughts intent or plans reported  No phobias but has obsessions and compulsions about examining her pills before she takes them  Karrie Nissen Psychosomatic about dying by choking from food and from heart attack or from shortness of breath  Perceptual Disturbances:  None reported   Risk Potential:  Ability to care for herself and refusal to take showers  Sensorium: fully oriented to place, person, time, date, day, month, year and to situation  Cognition:  Grossly intact, aware of current events like the corona virus situation, recent and remote memory intact    No language deficit  Consciousness:  Alert and awake  Attention:  fair  Intellect:  average  Insight:  limited  Judgment:  fair  Motor Activity: No abnormal involuntary movement noted toda    Vitals  Temp:  [97 1 °F (36 2 °C)-97 6 °F (36 4 °C)] 97 6 °F (36 4 °C)  HR:  [70-73] 70  Resp:  [16-18] 18  BP: (93-96)/(59-60) 96/60  SpO2:  [93 %] 93 %  No intake or output data in the 24 hours ending 04/27/20 0947    Lab Results: No New Labs Available For Today  Results from last 7 days   Lab Units 04/24/20  1028   WBC Thousand/uL 8 50   RBC Million/uL 4 64   HEMOGLOBIN g/dL 14 8   HEMATOCRIT % 43 5   MCV fL 94   PLATELETS Thousands/uL 183   NEUTROS ABS Thousands/µL 5 10         Current Facility-Administered Medications:  acetaminophen 325 mg Oral Q6H PRN Deep Durand MD   acetaminophen 650 mg Oral Q6H PRN Deep Durand MD   acetaminophen 650 mg Oral Q8H PRN Deep Durand MD   albuterol 2 puff Inhalation Q4H PRN Deep Durand MD   aluminum-magnesium hydroxide-simethicone 15 mL Oral Q4H PRN Ivonne Nevarez MD   ammonium lactate 1 application Topical BID PRN Ivonne Nevarez MD   benzonatate 100 mg Oral TID PRN Ivonne Nevarez MD   benztropine 1 mg Intramuscular Q8H PRN Ivonne Nevarez MD   carbamide peroxide 5 drop Left Ear BID PRN Ivonne Nevarez MD   cloZAPine 25 mg Oral BID Ivonne Nevarez MD   cloZAPine 50 mg Oral BID Ivonne Nevarez MD   docusate sodium 100 mg Oral BID PRN Ivonne Nevarez MD   EPINEPHrine PF 0 15 mg Intramuscular Once PRN Ivonne Nevarez MD   fluticasone-vilanterol 1 puff Inhalation Daily Ivonne Nevarez MD   ketotifen 1 drop Right Eye BID PRN Ivonne Nevarez MD   levothyroxine 125 mcg Oral Early Morning Ivonne Nevarez MD   magnesium hydroxide 30 mL Oral Daily PRN Ivonne Nevarez MD   montelukast 10 mg Oral HS Ivonne Nevarez MD   OLANZapine 5 mg Intramuscular Q8H PRN Ivonne Nevarez MD   OLANZapine 5 mg Oral Q8H PRN Ivonne Nevarez MD   ondansetron 4 mg Oral Q6H PRN Ivonne Nevarez MD   pantoprazole 40 mg Oral Early Morning Ivonne Nevarez MD   polyethylene glycol 17 g Oral Daily PRN Ivonne Nevarez MD   polyvinyl alcohol 1 drop Both Eyes Q3H PRN Ivonne Nevarez MD   sertraline 175 mg Oral Daily Ivonne Nevarez MD   sucralfate 1,000 mg Oral BID Ivonne Nevarez MD   theophylline 200 mg Oral Daily Ivonne Nevarez MD   tiotropium 18 mcg Inhalation Daily Ivonne Nevarez MD   traZODone 25 mg Oral HS PRN Ivonne Nevarez MD       Counseling / Coordination of Care: Total floor / unit time spent today 15 minutes  Greater than 50% of total time was spent with the patient and / or family counseling and / or somewhat receptive to supportive listening and teaching positive coping skills to deal with symptom mangement  Patient's Rights, confidentiality and exceptions to confidentiality, use of automated medical record, J[facility] Patrick staff access to medical record, and consent to treatment reviewed

## 2020-04-27 NOTE — PLAN OF CARE
Problem: Alteration in Thoughts and Perception  Goal: Verbalize thoughts and feelings  Description  Interventions:  - Promote a nonjudgmental and trusting relationship with the patient through active listening and therapeutic communication  - Assess patient's level of functioning, behavior and potential for risk  - Engage patient in 1 on 1 interactions for a minimum of 15 minutes each session  - Encourage patient to express fears, feelings, frustrations, and discuss symptoms    - [place] patient to reality, help patient recognize reality-based thinking   - Administer medications as ordered and assess for potential side effects  - Provide the patient education related to the signs and symptoms of the illness and desired effects of prescribed medications  Outcome: Progressing  Goal: Attend and participate in unit activities, including therapeutic, recreational, and educational groups  Description  Interventions:  - Provide therapeutic and educational activities daily, encourage attendance and participation, and document same in the medical record     CERTIFIED PEER SPECIALIST INTERVENTIONS:    Complete peer assessment with patient to assess their needs and identify their goals to complete while in the recovery program as well as once discharged into the community  Patient will complete WRAP Plan, Crisis Plan and 5 Life Domains  Patient will attend 50% of groups offered on the unit  Patient will complete a goal card weekly      Outcome: Progressing     Problem: Alteration in Orientation  Goal: Interact with staff daily  Description  Interventions:  - Assess and re-assess patient's level of orientation  - Engage patient in 1 on 1 interactions, daily, for a minimum of 15 minutes   - Establish rapport/trust with patient   Outcome: Progressing     Problem: Alteration in Thoughts and Perception  Goal: Complete daily ADLs, including personal hygiene independently, as able  Description  Interventions:  - Observe, teach, and assist patient with ADLS  - Monitor and promote a balance of rest/activity, with adequate nutrition and elimination   Outcome: Not Progressing     Problem: Depression  Goal: Refrain from isolation  Description  Interventions:  - Develop a trusting relationship   - Encourage socialization   Outcome: Not Progressing  Goal: Refrain from self-neglect  Outcome: Not Sophia De Dios was in her room most of the shift  She lays/sits in  bed, napping at times  Pleasant and cooperative upon approach  Able to make needs known  Once in the morning at 0730 complained of having to "urinate frequently"  No further mention of urinary issue throughout the day  Social with peers when out for her meals  Took medication without prompting  Swallowed pills without difficulty  Ate 25% of breakfast and refused lunch  No shower or BM  Very little motivation  Only went to [place] today  Incentive spirometer used and did well  Obtained 1250-1350ml for 10 breaths  No respiratory distress noted  Continue to monitor  Precautions maintained

## 2020-04-27 NOTE — PROGRESS NOTES
04/27/20 1032   Team Meeting   Meeting Type Daily Rounds   Team Members Present   Team Members Present Physician;Nurse;; Other (Discipline and Name)   Physician Team Member Dr Brias Arana Team Member Bebo Greer Management Team Member Ebony Stanley   Other (Discipline and Name) SHANIKA Champagne     No changes, remains somatic and isolative

## 2020-04-27 NOTE — PLAN OF CARE

## 2020-04-27 NOTE — TREATMENT TEAM
04/27/20 1100   Activity/Group Checklist   Group   (IMR)   Attendance Attended   Attendance Duration (min) 46-60   Interactions Interacted appropriately   Affect/Mood Appropriate;Bright;Normal range   Goals Achieved Identified feelings; Able to listen to others; Able to engage in interactions; Able to reflect/comment on own behavior;Able to self-disclose

## 2020-04-27 NOTE — PROGRESS NOTES
2140 Natalie Lopes was willing to do her Incentive Spirometry, achieving consistent volumes of 1250ml  She came out for HS snack, but, commented it was hard to swallow because, "Too much energy in the dining room"  Explained it was combination of TV & boisterous peers that was nerve wracking for her  Readily took her HS medicine except the Singulair  Does have a sense of accomplishment tonight  Wearing now her QHS humidified nasal O2 @ 1L for bed

## 2020-04-27 NOTE — TREATMENT TEAM
Patient declined      04/27/20 0900   Activity/Group Checklist   Group Community meeting  (Goal Cards )   Attendance Did not attend   Attendance Duration (min) 16-30   Affect/Mood IRMA

## 2020-04-27 NOTE — PLAN OF CARE
Problem: Alteration in Thoughts and Perception  Goal: Agree to be compliant with medication regime, as prescribed and report medication side effects  Description  Interventions:  - Offer appropriate PRN medication and supervise ingestion; conduct aims, as needed   Outcome: Progressing     Problem: Alteration in Thoughts and Perception  Goal: Attend and participate in unit activities, including therapeutic, recreational, and educational groups  Description  Interventions:  - Provide therapeutic and educational activities daily, encourage attendance and participation, and document same in the medical record     CERTIFIED PEER SPECIALIST INTERVENTIONS:    Complete peer assessment with patient to assess their needs and identify their goals to complete while in the recovery program as well as once discharged into the community  Patient will complete WRAP Plan, Crisis Plan and 5 Life Domains  Patient will attend 50% of groups offered on the unit  Patient will complete a goal card weekly  Outcome: Not Progressing     Problem: Depression  Goal: Refrain from isolation  Description  Interventions:  - Develop a trusting relationship   - Encourage socialization   Outcome: Not Progressing     Problem: Nutrition/Hydration-ADULT  Goal: Nutrient/Hydration intake appropriate for improving, restoring or maintaining nutritional needs  Description  Monitor and assess patient's nutrition/hydration status for malnutrition  Collaborate with interdisciplinary team and initiate plan and interventions as ordered  Monitor patient's weight and dietary intake as ordered or per policy  Utilize nutrition screening tool and intervene as necessary  Determine patient's food preferences and provide high-protein, high-caloric foods as appropriate       INTERVENTIONS:  - Monitor oral intake, urinary output, labs, and treatment plans  - Assess nutrition and hydration status and recommend course of action  - Evaluate amount of meals eaten  - Assist patient with eating if necessary   - Allow adequate time for meals  - Recommend/ encourage appropriate diets, oral nutritional supplements, and vitamin/mineral supplements  - Order, calculate, and assess calorie counts as needed  - Recommend, monitor, and adjust tube feedings and TPN/PPN based on assessed needs  - Assess need for intravenous fluids  - Provide specific nutrition/hydration education as appropriate  - Include patient/family/caregiver in decisions related to nutrition  Outcome: Not Martín Marrero continues to isolate in room in bed asleep most of the time  Did come out for supper medicine  Refused her meal, but, did drink the Ensure  No somatic complaints thus far this shift & is pleasant, more lively when interacting than was on weekend  Did not respond to invitation to go outside for [employer] group

## 2020-04-27 NOTE — PROGRESS NOTES
Sleeping with no s/s of distress noted  O2 on 1L via NC   All precautions in place and maintained, monitoring continues

## 2020-04-28 NOTE — TREATMENT TEAM
04/28/20 0900   Activity/Group Checklist   Group Community meeting  (Morning walk )   Attendance Did not attend   Attendance Duration (min) 16-30   Affect/Mood IRMA

## 2020-04-28 NOTE — PROGRESS NOTES
04/28/20 1331   Team Meeting   Meeting Type Tx Team Meeting   Initial Conference Date 04/28/20   Next Conference Date 05/05/20   Team Members Present   Team Members Present Physician;; Other (Discipline and Name)   Physician Team Member Dr Dea Butler Team Member Chito Humphreys, MSW   Other (Discipline and Name) Shanta Jacques, [facility]; Clement Combs, [facility] [address]; Ayesha Shipley, [facility]   Patient/Family Present   Patient Present Yes   Patient's Family Present No     Patient attended treatment team meeting this morning prepared without self-assessment  Patient's group attendance for last week was 12%  Team and patient completed risk assessment and the patient did not verbalize any desire to elope from the program  Patient verbalized understanding of consequences of eloping from treatment while on a commitment  Patient verbalized no further questions or concerns at the conclusion of the meeting  Next team meeting scheduled for 5/5/2020  Patient presented with multiple somatic complaints  Patient avoiding most of conversation surround her poor performance with programming here  Patient reports she is depressed  Continues to find reasons why she can not shower without assistance and voiced multiple concerns about going to [facility] to [place] and [place] on the phone

## 2020-04-28 NOTE — PROGRESS NOTES
VS  98 3   20   60   80/50  95%RA  Maggie maintained on ongoing fall and SAFE precaution   Laying in bed with eyes closed, breath even and unlabored   On O2 with humidifier @1L/m via nasal cannula  Continues rounding implemented   No somatic complaint overnight  No PRN needed for sleep aid   No indication of pain or discomfort   In no distress   Will continue to monitor

## 2020-04-28 NOTE — PROGRESS NOTES
Psychiatry Progress Note [address] 60 y o  female MRN: [id number]  Unit/Bed#: MARCIO ADAIR AUDRA [facility] 768-24 Encounter: [id number]  Code Status: Level 1 - Full Code    PCP: Marsha Wei PA-C    Date of Admission:  7/23/2019 1730   Date of Service:  04/28/20  Patient Active Problem List   Diagnosis    COPD with asthma ([facility] )    Tobacco use disorder, continuous    Compression fracture of L4 lumbar vertebra    Ventral hernia    Acute on chronic respiratory failure with hypoxia ([facility] [address] )    [facility], [facility]    Acquired hypothyroidism    Gastroesophageal reflux disease without esophagitis    Abnormal CT of the chest    Excessive cerumen in left ear canal    Lipoma of right upper extremity    Noncompliant with deep vein thrombosis (DVT) prophylaxis    At risk for aspiration    Ear ache    Tachycardia    Chest pain    Low HDL (under 40)     Diagnosis schizoaffective bipolar    Assessment   Overall Status: still not attending all groups, remains psychosomatic and anxious poorly groomed    Certification Statement to demonstrate a period of stability by attending to ADLs and becoming less psychosomatic in attending more groups Acceptance by patient:  Accepting  Rich Mendez in recovery:  Living at another personal care home   Understanding of medications: Yes    Involved in reintegration process: On hold due to [address] in relationship with psychiatrist:  Trusting sometimes    Medication changes    None today    Non-pharmacological treatments   Continue with individual, group, milieu and occupational therapy using recovery principles and psycho-education about accepting illness and the need for treatment   Encourage to take showers twice a week and cooperate with treatment and adl skills as per her behav   Plan   Another therapeutic pass with sister now that she did well  with the assigned staff escort to the park but it is now on hold due to corana virus   Prepare for the [facility] and encouraged to accept  rather than refuse it    Safety   Safety and communication plan established to target dynamic risk factors discussed above  Discharge Plan   · back to a [facility]    Interval Progress   Still anxious, preoccupied, suspicious, Still worried and  fearful about dying form a heart attack or from SOB or from choking on food as usual  Needs frequent support and reassuarance on the unit  She did attend ont the IMR group yesterday  Still suspicious of some staff tampering with her medications, oxygen concentrator and spirometer  She understands that she has to go to the [place] as an only alternative for her discharge outside a state hospital and hence she says she will try to achieve her goals for group attendance  Came to team meeting today but was better  groomed and  kept today after having taken a shower this weekend  Still anxious and poorly groomed today  Again complaining about the room at the [facility]  Still suspicious of certain staff tampering with her meds and oxygen concentrator and spirometer  Demanding staff to help her with showers despite her being capable of doing it on her own  Group attendance:12% last week  Sleep:   Fair  Appetite:   Fair but she picks and chooses her food but without loss of weight  Compliance with medications:  Good  Side effects:   none but claims to feel tired sometimes  Review of systems:  Continues to have psychosomatic symptoms     Mental Status Exam  Appearance:  Appears older than her stated age, poorly  groomed and not very well kept, with uncombed hairt but  wearing clean clothing   Anxious preoccupied  Has good eye contact friendly and pleasant today  Wearing a facial mask and did attend the team  Behavior:  Superficially friendly pleasant but anxious suspicious  Speech:  tangential at times with tendency to become stilted   Mood:   anxious irritated times,    Affect: increased in intensity range mood congruent redirectable  Thought Process:  Circumstantial with tendency to have stilted speech   Thought Content:  Continues to have some paranoia about motives of staff switching her medications or tampering with her inhalant or her oxygen concentrator off and on  Accusing  sleeping with the  at the [facility] ! She also has to examine her pills literally before taking them  No preoccupation with violence or suicide  No current suicidal homicidal thoughts intent or plans reported  No phobias but has obsessions and compulsions about examining her pills before she takes them  Lajean Cassette Psychosomatic about dying by choking from food and from heart attack or from shortness of breath and now from catching Covid-19  Perceptual Disturbances:  None reported   Risk Potential:  Ability to care for herself and refusal to take showers  Sensorium: fully oriented to place, person, time, date, day, month, year and to situation  Cognition:  Grossly intact, aware of current events like the corona virus situation, recent and remote memory intact    No language deficit  Consciousness:  Alert and awake  Attention:  fair  Intellect:  average  Insight:  limited  Judgment:  fair  Motor Activity: No abnormal involuntary movement noted toda    Vitals  Temp:  [97 5 °F (36 4 °C)-98 8 °F (37 1 °C)] 97 5 °F (36 4 °C)  HR:  [61-84] 68  Resp:  [15-20] 20  BP: (81-96)/(51-60) 95/51  SpO2:  [92 %-95 %] 92 %  No intake or output data in the 24 hours ending 04/28/20 0838    Lab Results: No New Labs Available For Today  Results from last 7 days   Lab Units 04/24/20  1028   WBC Thousand/uL 8 50   RBC Million/uL 4 64   HEMOGLOBIN g/dL 14 8   HEMATOCRIT % 43 5   MCV fL 94   PLATELETS Thousands/uL 183   NEUTROS ABS Thousands/µL 5 10         Current Facility-Administered Medications:  acetaminophen 325 mg Oral Q6H PRN Felisa Florence MD   acetaminophen 650 mg Oral Q6H PRN Felisa Florence MD   acetaminophen 650 mg Oral Q8H PRN Chichi Lockett MD   albuterol 2 puff Inhalation Q4H PRN Chichi Lockett MD   aluminum-magnesium hydroxide-simethicone 15 mL Oral Q4H PRN Chichi Lockett MD   ammonium lactate 1 application Topical BID PRN Chichi Lockett MD   benzonatate 100 mg Oral TID PRN Chichi Lockett MD   benztropine 1 mg Intramuscular Q8H PRN Chichi Lockett MD   carbamide peroxide 5 drop Left Ear BID PRN Chichi Lockett MD   cloZAPine 25 mg Oral BID Chichi Lockett MD   cloZAPine 50 mg Oral BID Chichi Lockett MD   docusate sodium 100 mg Oral BID PRN Chichi Lockett MD   EPINEPHrine PF 0 15 mg Intramuscular Once PRN Chichi Lockett MD   fluticasone-vilanterol 1 puff Inhalation Daily Chichi Lockett MD   ketotifen 1 drop Right Eye BID PRN Chichi Lockett MD   levothyroxine 125 mcg Oral Early Morning Chichi Lockett MD   magnesium hydroxide 30 mL Oral Daily PRN Chichi Lockett MD   montelukast 10 mg Oral HS Chichi Lockett MD   OLANZapine 5 mg Intramuscular Q8H PRN Chichi Lockett MD   OLANZapine 5 mg Oral Q8H PRN Chichi Lockett MD   ondansetron 4 mg Oral Q6H PRN Chichi Lockett MD   pantoprazole 40 mg Oral Early Morning Chichi Lockett MD   polyethylene glycol 17 g Oral Daily PRN Chichi Lockett MD   polyvinyl alcohol 1 drop Both Eyes Q3H PRN Chichi Lockett MD   sertraline 175 mg Oral Daily Chichi Lockett MD   sucralfate 1,000 mg Oral BID Chichi Lockett MD   theophylline 200 mg Oral Daily Chichi Lockett MD   tiotropium 18 mcg Inhalation Daily Chichi Lockett MD   traZODone 25 mg Oral HS PRN Chichi Lockett MD       Counseling / Coordination of Care: Total floor / unit time spent today 15 minutes  Greater than 50% of total time was spent with the patient and / or family counseling and / or somewhat receptive to supportive listening and teaching positive coping skills to deal with symptom mangement  Patient's Rights, confidentiality and exceptions to confidentiality, use of automated medical record, [address] [place] [address] staff access to medical record, and consent to treatment reviewed

## 2020-04-28 NOTE — PLAN OF CARE
Problem: Alteration in Thoughts and Perception  Goal: Verbalize thoughts and feelings  Description  Interventions:  - Promote a nonjudgmental and trusting relationship with the patient through active listening and therapeutic communication  - Assess patient's level of functioning, behavior and potential for risk  - Engage patient in 1 on 1 interactions for a minimum of 15 minutes each session  - Encourage patient to express fears, feelings, frustrations, and discuss symptoms    - [place] patient to reality, help patient recognize reality-based thinking   - Administer medications as ordered and assess for potential side effects  - Provide the patient education related to the signs and symptoms of the illness and desired effects of prescribed medications  Outcome: Progressing  Goal: Agree to be compliant with medication regime, as prescribed and report medication side effects  Description  Interventions:  - Offer appropriate PRN medication and supervise ingestion; conduct aims, as needed   Outcome: Progressing     Problem: Ineffective Coping  Goal: Patient/Family verbalizes awareness of resources  Outcome: Progressing  Goal: Understands least restrictive measures  Description  Interventions:  - Utilize least restrictive behavior  Outcome: Progressing     Problem: Risk for Self Injury/Neglect  Goal: Treatment Goal: Remain safe during length of stay, learn and adopt new coping skills, and be free of self-injurious ideation, impulses and acts at the time of discharge  Outcome: Progressing  Goal: Verbalize thoughts and feelings  Description  Interventions:  - Assess and re-assess patient's lethality and potential for self-injury  - Engage patient in 1:1 interactions, daily, for a minimum of 15 minutes  - Encourage patient to express feelings, fears, frustrations, hopes  - Establish rapport/trust with patient   Outcome: Progressing  Goal: Refrain from harming self  Description  Interventions:  - Monitor patient closely, per order  - Develop a trusting relationship  - Supervise medication ingestion, monitor effects and side effects   Outcome: Progressing     Problem: Depression  Goal: Verbalize thoughts and feelings  Description  Interventions:  - Assess and re-assess patient's level of risk   - Engage patient in 1:1 interactions, daily, for a minimum of 15 minutes   - Encourage patient to express feelings, fears, frustrations, hopes   Outcome: Progressing     Problem: Anxiety  Goal: Anxiety is at manageable level  Description  Interventions:  - Assess and monitor patient's anxiety level  - Monitor for signs and symptoms of anxiety both physical and emotional (heart palpitations, chest pain, shortness of breath, headaches, nausea, feeling jumpy, restlessness, irritable, apprehensive)  - Collaborate with interdisciplinary team and initiate plan and interventions as ordered    - [place] patient to unit/surroundings  - Explain treatment plan  - Encourage participation in care  - Encourage verbalization of concerns/fears  - Identify coping mechanisms  - Assist in developing anxiety-reducing skills  - Administer/offer alternative therapies  - Limit or eliminate stimulants  Outcome: Progressing     Problem: Alteration in Orientation  Goal: Interact with staff daily  Description  Interventions:  - Assess and re-assess patient's level of orientation  - Engage patient in 1 on 1 interactions, daily, for a minimum of 15 minutes   - Establish rapport/trust with patient   Outcome: Progressing     Problem: PAIN - ADULT  Goal: Verbalizes/displays adequate comfort level or baseline comfort level  Description  Interventions:  - Encourage patient to monitor pain and request assistance  - Assess pain using appropriate pain scale  - Administer analgesics based on type and severity of pain and evaluate response  - Implement non-pharmacological measures as appropriate and evaluate response  - Consider cultural and social influences on pain and pain management  - Notify physician/advanced practitioner if interventions unsuccessful or patient reports new pain  Outcome: Progressing     Problem: SAFETY ADULT  Goal: Patient will remain free of falls  Description  INTERVENTIONS:  - Assess patient frequently for physical needs  -  Identify cognitive and physical deficits and behaviors that affect risk of falls  -  [place] fall precautions as indicated by assessment   - Educate patient/family on patient safety including physical limitations  - Instruct patient to call for assistance with activity based on assessment  - Modify environment to reduce risk of injury  - Consider OT/PT consult to assist with strengthening/mobility  Outcome: Progressing   Mostly isolative to her bed in her room, did not attend any offered groups  Remains delusional and somatically preoccupied  Reported feeling a lump below her right ear  Upon inspection, lump is not visible and barely palpable  Feels she now has cancer and want's immediate follow up from medical doctor as this is "a very serious matter, I could die"  Reassured patient and told her that will make a note of it  Encouraged her to follow up tomorrow morning when psychiatrist will be on the unit  Ate poorly only eating 25% of breakfast and 5% of lunch  No other behaviors or issues noted  Med compliant  Continue to monitor

## 2020-04-28 NOTE — PLAN OF CARE
Problem: Alteration in Thoughts and Perception  Goal: Verbalize thoughts and feelings  Description  Interventions:  - Promote a nonjudgmental and trusting relationship with the patient through active listening and therapeutic communication  - Assess patient's level of functioning, behavior and potential for risk  - Engage patient in 1 on 1 interactions for a minimum of 15 minutes each session  - Encourage patient to express fears, feelings, frustrations, and discuss symptoms    - [place] patient to reality, help patient recognize reality-based thinking   - Administer medications as ordered and assess for potential side effects  - Provide the patient education related to the signs and symptoms of the illness and desired effects of prescribed medications  Outcome: Progressing   Patient and writer met briefly to discuss her lack of motivation, group attendance and working with writer face to face  Patient did attend 2 groups this writer facilitated, [facility] and Recovery Workshop  Writer gave Patient praise and encouragement in which she replied, "it did feel good to get out of her funk " DY did share that she has been feeling down and depressed, as writer explained laying in bed and doing nothing is not a effective coping skill  Writer will explore development of coping skills through [employer]  Patient began to put herself down knowing she did not attend [facility] today as writer reminded her it is "progress not perfection in recovery " Patient smiled and claimed she will be attending tomorrow  Patient was also reminded that she does very well when in groups and she is an asset to the groups  Patient pondered when ask about face to face, April Tejeda did not push however, will continue to revisit this to complete her WRAP Plan, Life Domain goals and BH Relapse Prevention

## 2020-04-28 NOTE — PLAN OF CARE
Problem: Alteration in Thoughts and Perception  Goal: Verbalize thoughts and feelings  Description  Interventions:  - Promote a nonjudgmental and trusting relationship with the patient through active listening and therapeutic communication  - Assess patient's level of functioning, behavior and potential for risk  - Engage patient in 1 on 1 interactions for a minimum of 15 minutes each session  - Encourage patient to express fears, feelings, frustrations, and discuss symptoms    - [place] patient to reality, help patient recognize reality-based thinking   - Administer medications as ordered and assess for potential side effects  - Provide the patient education related to the signs and symptoms of the illness and desired effects of prescribed medications  Outcome: Progressing  Goal: Attend and participate in unit activities, including therapeutic, recreational, and educational groups  Description  Interventions:  - Provide therapeutic and educational activities daily, encourage attendance and participation, and document same in the medical record     CERTIFIED PEER SPECIALIST INTERVENTIONS:    Complete peer assessment with patient to assess their needs and identify their goals to complete while in the recovery program as well as once discharged into the community  Patient will complete WRAP Plan, Crisis Plan and 5 Life Domains  Patient will attend 50% of groups offered on the unit  Patient will complete a goal card weekly      Outcome: Progressing     Problem: Alteration in Orientation  Goal: Interact with staff daily  Description  Interventions:  - Assess and re-assess patient's level of orientation  - Engage patient in 1 on 1 interactions, daily, for a minimum of 15 minutes   - Establish rapport/trust with patient   Outcome: Progressing     Problem: SAFETY ADULT  Goal: Patient will remain free of falls  Description  INTERVENTIONS:  - Assess patient frequently for physical needs  -  Identify cognitive and physical deficits and behaviors that affect risk of falls  -  [place] fall precautions as indicated by assessment   - Educate patient/family on patient safety including physical limitations  - Instruct patient to call for assistance with activity based on assessment  - Modify environment to reduce risk of injury  - Consider OT/PT consult to assist with strengthening/mobility  Outcome: Progressing     Problem: Alteration in Thoughts and Perception  Goal: Complete daily ADLs, including personal hygiene independently, as able  Description  Interventions:  - Observe, teach, and assist patient with ADLS  - Monitor and promote a balance of rest/activity, with adequate nutrition and elimination   Outcome: Not Progressing     Problem: Depression  Goal: Refrain from isolation  Description  Interventions:  - Develop a trusting relationship   - Encourage socialization   Outcome: Not Progressing     [place] was in Recovery group at the beginning of the shift  Then sat by the phone by the nurses station  She was being somatic about needing her blood sugar taken  "I didn't eat much today  I'm sure it is low  I have that swallowing problem"  Drank some orange juice without and issue and took her pills without difficulty swallowing  Refused her meal tray, but did drink all of the Ensure  Social with select peers  Likes to tell peers her issues  Attended evening group (played cards with peers)  Took medication without difficulty  Ate snack (pudding and juice)  Oxygen saturation 90% prior to humidified oxygen 1 liter via nasal cannula applied  Continue to monitor  Precautions maintained

## 2020-04-28 NOTE — PROGRESS NOTES
04/28/20 1322   Team Meeting   Meeting Type Daily Rounds   Team Members Present   Team Members Present Physician;Nurse;; Other (Discipline and Name)   Physician Team Member Dr Wild Wesley Team Member Laure Eldridge, rN   Care Management Team Member Anil Gaitan MSW   Other (Discipline and Name) Bryson Mohan, CPS; Julia Duran, LCSW     Somatic, believes she has tumor on ear  Poor appetite

## 2020-04-28 NOTE — TREATMENT TEAM
Patient declined      04/28/20 1100   Activity/Group Checklist   Group Grief/loss  (IMR )   Attendance Did not attend   Attendance Duration (min) 46-60   Affect/Mood IRMA

## 2020-04-29 NOTE — PROGRESS NOTES
Psychiatry Progress Note [address] 60 y o  female MRN: [id number]  Unit/Bed#: [facility] 295-05 Encounter: [id number]  Code Status: Level 1 - Full Code    PCP: Karen Holm PA-C    Date of Admission:  7/23/2019 1730   Date of Service:  04/29/20  Patient Active Problem List   Diagnosis    COPD with asthma ([facility]    Tobacco use disorder, continuous    Compression fracture of L4 lumbar vertebra    Ventral hernia    Acute on chronic respiratory failure with hypoxia ([facility]    [facility], [facility]    Acquired hypothyroidism    Gastroesophageal reflux disease without esophagitis    Abnormal CT of the chest    Excessive cerumen in left ear canal    Lipoma of right upper extremity    Noncompliant with deep vein thrombosis (DVT) prophylaxis    At risk for aspiration    Ear ache    Tachycardia    Chest pain    Low HDL (under 40)     Diagnosis schizoaffective bipolar    Assessment   Overall Status:  Remains psychosomatic with no major changes coming up with new complaints he but beginning to attend groups   Certification Statement to demonstrate a period of stability by attending to ADLs and becoming less psychosomatic in attending more groups Acceptance by patient:  Accepting  [place] Check in recovery:  Living at another personal care home   Understanding of medications: Yes    Involved in reintegration process: On hold due to [address] in relationship with psychiatrist:  Trusting sometimes    Medication changes    None today    Non-pharmacological treatments   Continue with individual, group, milieu and occupational therapy using recovery principles and psycho-education about accepting illness and the need for treatment   Encourage to take showers twice a week and cooperate with treatment and adl skills as per her behav  Plan   Therapeutic passes to resume once Covid-19 restrictions are lifted     Prepare for the [facility] and encouraged to accept  rather than refuse it    Safety   Safety and communication plan established to target dynamic risk factors discussed above  Discharge Plan   · back to a [facility] at [address]   Patient remains suspicious preoccupied fearful about dying and now has a new complain about having a tumor behind her right ear! A checked it out which is not warm to touch nor inflamed or read with no tenderness and and on she any appreciable swelling on her right ear which will be checked again  She was asking staff to check her blood sugar even though she has no problems with her blood sugar  She continues to have fears that she is going to die from choking on food or shortness of breath or heart attack  She has begun to attend some groups but is self loathing claiming to feel sad and depressed over being here this long but when reminded she admitted that she is the one who has to do things on her own to help herself rather than expect others to do things for her all the time  She is not expressing any overt delusions even though she is still somewhat suspicious and questions some of the staff and examines her medications before taking them as she believes staff maybe tampering with the medications and also questions if they are tampering with her oxygen concentrator and spirometer  Still anxious poorly groomed and questioning if she should go back to the THE [facility]    She tells me that she is due to take a shower either today or tomorrow    Group attendance:12% last week but beginning to improve this week  Sleep:   Fair  Appetite:   Fair but she picks and chooses her food but without loss of weight  Compliance with medications:  Good  Side effects:   none but claims to feel tired sometimes  Review of systems:  Continues to have psychosomatic symptoms     Mental Status Exam  Appearance:  Appears older than her stated age, poorly  groomed and not very well kept, with uncombed hairt but wearing clean clothing   Anxious preoccupied  Has good eye contact friendly and pleasant today  Wearing a facial mask found sitting on bed and tells me she will try to take a shower either today or tomorrow  Behavior:  Superficially friendly pleasant but anxious suspicious  Speech:  tangential at times with tendency to become stilted   Mood:   anxious irritated times,    Affect: increased in intensity range mood congruent redirectable  Thought Process:  Circumstantial with tendency to have stilted speech   Thought Content:  Continues to have some paranoia about motives of staff switching her medications or tampering with her inhalant or her oxygen concentrator off and on  Accusing  sleeping with the  at the [facility] ! She also has to examine her pills literally before taking them  No preoccupation with violence or suicide  No current suicidal homicidal thoughts intent or plans reported  No phobias but has obsessions and compulsions about examining her pills before she takes them  Salazar Zuniga Psychosomatic about dying by choking from food and from heart attack or from shortness of breath and now from catching Covid-19  Perceptual Disturbances:  None reported   Risk Potential:  Ability to care for herself and refusal to take showers  Sensorium: fully oriented to place, person, time, date, day, month, year and to situation  Cognition:  Grossly intact, aware of current events like the corona virus situation, recent and remote memory intact    No language deficit  Consciousness:  Alert and awake  Attention:  fair  Intellect:  average  Insight:  limited  Judgment:  fair  Motor Activity: No abnormal involuntary movement noted today    Vitals  Temp:  [97 3 °F (36 3 °C)-97 7 °F (36 5 °C)] 97 7 °F (36 5 °C)  HR:  [68-91] 87  Resp:  [16-20] 18  BP: ()/(51-69) 98/65  SpO2:  [90 %-97 %] 97 %  No intake or output data in the 24 hours ending 04/29/20 7034    Lab Results: No New Labs Available For Today  Results from last 7 days   Lab Units 04/24/20  1028   WBC Thousand/uL 8 50   RBC Million/uL 4 64   HEMOGLOBIN g/dL 14 8   HEMATOCRIT % 43 5   MCV fL 94   PLATELETS Thousands/uL 183   NEUTROS ABS Thousands/µL 5 10         Current Facility-Administered Medications:  acetaminophen 325 mg Oral Q6H PRN Sarah Hanna MD   acetaminophen 650 mg Oral Q6H PRN Sarah Hanna MD   acetaminophen 650 mg Oral Q8H PRN Sarah Hanna MD   albuterol 2 puff Inhalation Q4H PRN Sarah Hanna MD   aluminum-magnesium hydroxide-simethicone 15 mL Oral Q4H PRN Sarah Hanna MD   ammonium lactate 1 application Topical BID PRN Sarah Hanna MD   benzonatate 100 mg Oral TID PRN Sarah Hanna MD   benztropine 1 mg Intramuscular Q8H PRN Sarah Hanna MD   carbamide peroxide 5 drop Left Ear BID PRN Sarah Hanna MD   cloZAPine 25 mg Oral BID Sarah Hanna MD   cloZAPine 50 mg Oral BID Sarah Hanna MD   docusate sodium 100 mg Oral BID PRN Sarah Hanna MD   EPINEPHrine PF 0 15 mg Intramuscular Once PRN Sarah Hanna MD   fluticasone-vilanterol 1 puff Inhalation Daily Sarah Hanna MD   ketotifen 1 drop Right Eye BID PRN Sarah Hanna MD   levothyroxine 125 mcg Oral Early Morning Sarah Hanna MD   magnesium hydroxide 30 mL Oral Daily PRN Sarah Hanna MD   montelukast 10 mg Oral HS Sarah Hanna MD   OLANZapine 5 mg Intramuscular Q8H PRN Sarah Hanna MD   OLANZapine 5 mg Oral Q8H PRN Sarah Hanna MD   ondansetron 4 mg Oral Q6H PRN Sarah Hanna MD   pantoprazole 40 mg Oral Early Morning Sarah Hanna MD   polyethylene glycol 17 g Oral Daily PRN Sarah Hanna MD   polyvinyl alcohol 1 drop Both Eyes Q3H PRN Sarah Hanna MD   sertraline 175 mg Oral Daily Sarah Hanna MD   sucralfate 1,000 mg Oral BID Sarah Hanna MD   theophylline 200 mg Oral Daily Sarah Hanna MD   tiotropium 18 mcg Inhalation Daily Sarah Hanna MD   traZODone 25 mg Oral HS PRN Sarah Hanna MD       Counseling / Coordination of Care: Total floor / unit time spent today 15 minutes   Greater than 50% of total time was spent with the patient and / or family counseling and / or somewhat receptive to supportive listening and teaching positive coping skills to deal with symptom mangement  Patient's Rights, confidentiality and exceptions to confidentiality, use of automated medical record, Jeb Patrick staff access to medical record, and consent to treatment reviewed

## 2020-04-29 NOTE — TREATMENT TEAM
04/29/20 0945   Activity/Group Checklist   Group Community meeting   Attendance Did not attend   Attendance Duration (min) 16-30   Affect/Mood IRMA

## 2020-04-29 NOTE — PROGRESS NOTES
04/29/20 0900   Team Meeting   Meeting Type Daily Rounds   Team Members Present   Team Members Present Physician;Nurse; Other (Discipline and Name)   Physician Team Member Dr Birgit Castro Team Member Abelardo Gu, RN   Other (Discipline and Name) Jasvir Hopkins LCSW; Alice Tai, CPS     Poor appetite, remains somatic about blood pressure, "tumor behind ear "

## 2020-04-29 NOTE — TREATMENT TEAM
04/29/20 1100   Activity/Group Checklist   Group   (IMR/Stigma)   Attendance Attended   Attendance Duration (min) 46-60   Interactions Interacted appropriately   Affect/Mood Appropriate;Calm;Normal range;Bright   Goals Achieved Identified feelings; Able to listen to others; Able to engage in interactions; Able to manage/cope with feelings; Able to self-disclose

## 2020-04-29 NOTE — TREATMENT TEAM
04/28/20 1400   Activity/Group Checklist   Group   (Recovery Workshop )   Attendance Attended   Attendance Duration (min) 46-60   Interactions Interacted appropriately   Affect/Mood Appropriate;Bright;Normal range   Goals Achieved Identified feelings; Displayed empathy;Able to engage in interactions; Able to listen to others; Able to reflect/comment on own behavior;Able to self-disclose

## 2020-04-29 NOTE — PLAN OF CARE
Problem: Ineffective Coping  Goal: Identifies healthy coping skills  Outcome: Progressing  Goal: Participates in unit activities  Description  Interventions:  - Provide therapeutic environment   - Provide required programming   - Redirect inappropriate behaviors   Outcome: Progressing  Goal: Patient/Family participate in treatment and DC plans  Description  Interventions:  - Provide therapeutic environment  Outcome: Progressing  Patient agreeable to meet with this writer face to face and begin to work on her [facility]  Prior to this time, Patient had turned down offers previously to meet with this writer  Patient was pleasant, cooperative and able to identify enjoyable activities/coping skills as "my son, my sister, my brother, playing cards, movies, cooking, going out to eat, talking to people, growing plants, reading and cultural activities " What inspires Patient and brings meaning and purpose to her life is "my son and my family " Patient's hopes and dreams are to one day travel by train, try to live on her own again and find a volunteer position " Patient identified her baseline as " enjoy getting up, enjoy everything, productive, feel good about myself, listen to music, clean and no issues with hygiene " Patient will be working on recognition of her "triggers" as we will construct a list of them in her [facility] at next face to face

## 2020-04-29 NOTE — PLAN OF CARE
Problem: Alteration in Thoughts and Perception  Goal: Verbalize thoughts and feelings  Description  Interventions:  - Promote a nonjudgmental and trusting relationship with the patient through active listening and therapeutic communication  - Assess patient's level of functioning, behavior and potential for risk  - Engage patient in 1 on 1 interactions for a minimum of 15 minutes each session  - Encourage patient to express fears, feelings, frustrations, and discuss symptoms    - [place] patient to reality, help patient recognize reality-based thinking   - Administer medications as ordered and assess for potential side effects  - Provide the patient education related to the signs and symptoms of the illness and desired effects of prescribed medications  Outcome: Progressing  Goal: Agree to be compliant with medication regime, as prescribed and report medication side effects  Description  Interventions:  - Offer appropriate PRN medication and supervise ingestion; conduct aims, as needed   Outcome: Progressing  Goal: Attend and participate in unit activities, including therapeutic, recreational, and educational groups  Description  Interventions:  - Provide therapeutic and educational activities daily, encourage attendance and participation, and document same in the medical record     CERTIFIED PEER SPECIALIST INTERVENTIONS:    Complete peer assessment with patient to assess their needs and identify their goals to complete while in the recovery program as well as once discharged into the community  Patient will complete WRAP Plan, Crisis Plan and 5 Life Domains  Patient will attend 50% of groups offered on the unit  Patient will complete a goal card weekly      Outcome: Progressing     Problem: Ineffective Coping  Goal: Participates in unit activities  Description  Interventions:  - Provide therapeutic environment   - Provide required programming   - Redirect inappropriate behaviors   Outcome: Progressing  Goal: Understands least restrictive measures  Description  Interventions:  - Utilize least restrictive behavior  Outcome: Progressing     Problem: Risk for Self Injury/Neglect  Goal: Treatment Goal: Remain safe during length of stay, learn and adopt new coping skills, and be free of self-injurious ideation, impulses and acts at the time of discharge  Outcome: Progressing  Goal: Verbalize thoughts and feelings  Description  Interventions:  - Assess and re-assess patient's lethality and potential for self-injury  - Engage patient in 1:1 interactions, daily, for a minimum of 15 minutes  - Encourage patient to express feelings, fears, frustrations, hopes  - Establish rapport/trust with patient   Outcome: Progressing  Goal: Refrain from harming self  Description  Interventions:  - Monitor patient closely, per order  - Develop a trusting relationship  - Supervise medication ingestion, monitor effects and side effects   Outcome: Progressing  Goal: Attend and participate in unit activities, including therapeutic, recreational, and educational groups  Description  Interventions:  - Provide therapeutic and educational activities daily, encourage attendance and participation, and document same in the medical record  - Obtain collateral information, encourage visitation and family involvement in care   Outcome: Progressing     Problem: Depression  Goal: Treatment Goal: Demonstrate behavioral control of depressive symptoms, verbalize feelings of improved mood/affect, and adopt new coping skills prior to discharge  Outcome: Progressing  Goal: Verbalize thoughts and feelings  Description  Interventions:  - Assess and re-assess patient's level of risk   - Engage patient in 1:1 interactions, daily, for a minimum of 15 minutes   - Encourage patient to express feelings, fears, frustrations, hopes   Outcome: Progressing  Goal: Refrain from isolation  Description  Interventions:  - Develop a trusting relationship   - Encourage socialization   Outcome: Progressing     Problem: Anxiety  Goal: Anxiety is at manageable level  Description  Interventions:  - Assess and monitor patient's anxiety level  - Monitor for signs and symptoms of anxiety both physical and emotional (heart palpitations, chest pain, shortness of breath, headaches, nausea, feeling jumpy, restlessness, irritable, apprehensive)  - Collaborate with interdisciplinary team and initiate plan and interventions as ordered  - [place] patient to unit/surroundings  - Explain treatment plan  - Encourage participation in care  - Encourage verbalization of concerns/fears  - Identify coping mechanisms  - Assist in developing anxiety-reducing skills  - Administer/offer alternative therapies  - Limit or eliminate stimulants  Outcome: Progressing     Problem: Alteration in Orientation  Goal: Interact with staff daily  Description  Interventions:  - Assess and re-assess patient's level of orientation  - Engage patient in 1 on 1 interactions, daily, for a minimum of 15 minutes   - Establish rapport/trust with patient   Outcome: Progressing     Problem: PAIN - ADULT  Goal: Verbalizes/displays adequate comfort level or baseline comfort level  Description  Interventions:  - Encourage patient to monitor pain and request assistance  - Assess pain using appropriate pain scale  - Administer analgesics based on type and severity of pain and evaluate response  - Implement non-pharmacological measures as appropriate and evaluate response  - Consider cultural and social influences on pain and pain management  - Notify physician/advanced practitioner if interventions unsuccessful or patient reports new pain  Outcome: Progressing     Problem: SAFETY ADULT  Goal: Patient will remain free of falls  Description  INTERVENTIONS:  - Assess patient frequently for physical needs  -  Identify cognitive and physical deficits and behaviors that affect risk of falls    -  [place] fall precautions as indicated by assessment   - Educate patient/family on patient safety including physical limitations  - Instruct patient to call for assistance with activity based on assessment  - Modify environment to reduce risk of injury  - Consider OT/PT consult to assist with strengthening/mobility  Outcome: Progressing     Problem: Alteration in Thoughts and Perception  Goal: Treatment Goal: Gain control of psychotic behaviors/thinking, reduce/eliminate presenting symptoms and demonstrate improved reality functioning upon discharge  Outcome: Not Progressing  Goal: Recognize dysfunctional thoughts, communicate reality-based thoughts at the time of discharge  Description  Interventions:  - Provide medication and psycho-education to assist patient in compliance and developing insight into his/her illness   Outcome: Not Progressing  Goal: Complete daily ADLs, including personal hygiene independently, as able  Description  Interventions:  - Observe, teach, and assist patient with ADLS  - Monitor and promote a balance of rest/activity, with adequate nutrition and elimination   Outcome: Not Progressing     Problem: Risk for Self Injury/Neglect  Goal: Complete daily ADLs, including personal hygiene independently, as able  Description  Interventions:  - Observe, teach, and assist patient with ADLS  - Monitor and promote a balance of rest/activity, with adequate nutrition and elimination  Outcome: Not Progressing     Problem: Depression  Goal: Refrain from self-neglect  Outcome: Not Progressing     Problem: Nutrition/Hydration-ADULT  Goal: Nutrient/Hydration intake appropriate for improving, restoring or maintaining nutritional needs  Description  Monitor and assess patient's nutrition/hydration status for malnutrition  Collaborate with interdisciplinary team and initiate plan and interventions as ordered  Monitor patient's weight and dietary intake as ordered or per policy  Utilize nutrition screening tool and intervene as necessary  Determine patient's food preferences and provide high-protein, high-caloric foods as appropriate  INTERVENTIONS:  - Monitor oral intake, urinary output, labs, and treatment plans  - Assess nutrition and hydration status and recommend course of action  - Evaluate amount of meals eaten  - Assist patient with eating if necessary   - Allow adequate time for meals  - Recommend/ encourage appropriate diets, oral nutritional supplements, and vitamin/mineral supplements  - Order, calculate, and assess calorie counts as needed  - Recommend, monitor, and adjust tube feedings and TPN/PPN based on assessed needs  - Assess need for intravenous fluids  - Provide specific nutrition/hydration education as appropriate  - Include patient/family/caregiver in decisions related to nutrition  Outcome: Not Progressing   Mostly isolative to her bed in her room but did attend [facility]  Remains delusional and somatically preoccupied, currently believes that she may have cancer  Ate poorly only eating 25% of breakfast and only her drinks for lunch  No other behaviors or issues noted  Med compliant  Continue to monitor

## 2020-04-29 NOTE — PROGRESS NOTES
VS  97 3   90   18   132/68   97%RA  Maggie maintained on ongoing fall and SAFE precaution   Laying in bed with eyes closed, breath even and unlabored   On O2 with humidifier @1L/m via nasal cannula  Continues rounding implemented   No somatic complaint overnight  No PRN needed for sleep aid   No indication of pain or discomfort   In no distress   Will continue to monitor

## 2020-04-29 NOTE — PLAN OF CARE
Problem: Alteration in Thoughts and Perception  Goal: Verbalize thoughts and feelings  Description  Interventions:  - Promote a nonjudgmental and trusting relationship with the patient through active listening and therapeutic communication  - Assess patient's level of functioning, behavior and potential for risk  - Engage patient in 1 on 1 interactions for a minimum of 15 minutes each session  - Encourage patient to express fears, feelings, frustrations, and discuss symptoms    - [place] patient to reality, help patient recognize reality-based thinking   - Administer medications as ordered and assess for potential side effects  - Provide the patient education related to the signs and symptoms of the illness and desired effects of prescribed medications  Outcome: Progressing  Goal: Agree to be compliant with medication regime, as prescribed and report medication side effects  Description  Interventions:  - Offer appropriate PRN medication and supervise ingestion; conduct aims, as needed   Outcome: Progressing     Problem: Alteration in Thoughts and Perception  Goal: Attend and participate in unit activities, including therapeutic, recreational, and educational groups  Description  Interventions:  - Provide therapeutic and educational activities daily, encourage attendance and participation, and document same in the medical record     CERTIFIED PEER SPECIALIST INTERVENTIONS:    Complete peer assessment with patient to assess their needs and identify their goals to complete while in the recovery program as well as once discharged into the community  Patient will complete WRAP Plan, Crisis Plan and 5 Life Domains  Patient will attend 50% of groups offered on the unit  Patient will complete a goal card weekly      Outcome: Not Progressing  Goal: Complete daily ADLs, including personal hygiene independently, as able  Description  Interventions:  - Observe, teach, and assist patient with ADLS  - Monitor and promote a balance of rest/activity, with adequate nutrition and elimination   Outcome: Not Progressing     Problem: Depression  Goal: Refrain from isolation  Description  Interventions:  - Develop a trusting relationship   - Encourage socialization   Outcome: Not Maximilian Ahr presents as wan & peaked  Lays crumpled in her bed asleep  Had c/o dizziness when sat up after this nurse awakened her  Low energy, lacks motivation for hygiene, to be OOB, to go to kitchen for water when it is called  Came for supper medicine, but, refused dinner tray; did drink the Ensure  Says not feeling well, but, isn't specific  Did not respond to invitations to either optional [employer] group or [employer]

## 2020-04-29 NOTE — PLAN OF CARE
Problem: Ineffective Coping  Goal: Identifies healthy coping skills  4/29/2020 1415 by Tin Nur  Outcome: Progressing  4/29/2020 1349 by Tin Nur  Outcome: Progressing     Problem: Ineffective Coping  Goal: Participates in unit activities  Description  Interventions:  - Provide therapeutic environment   - Provide required programming   - Redirect inappropriate behaviors   4/29/2020 1415 by Tin Nur  Outcome: Progressing  4/29/2020 1349 by Tin Nur  Outcome: Progressing  Patient did not attend groups and last weeks average remains at a low 17%  Writer did see some improvement this week when she attended and participated in more groups  Patient was also in agreement to meet with this writer and began working on [employer]  Patient seemed to enjoy the attention and personal time with staff member  Patient was cooperative, pleasant and insightful to coping skills and her feelings of depression  Albertina Elliott will continue to work with Patient face to face on triggers, coping skills, group percentage and personal goals

## 2020-04-30 NOTE — PROGRESS NOTES
Maggie maintained on ongoing fall and SAFE precaution  Fabiano Jerson in bed with eyes closed, breath even and unlabored   On O2 with humidifier @1L/m via nasal cannula  Continues rounding implemented   No somatic complaint overnight  No PRN needed for sleep aid   No indication of pain or discomfort   In no distress   Will continue to monitor    VS 96 9   93   18   125/72   93%RA

## 2020-04-30 NOTE — PROGRESS NOTES
04/30/20 0902   Team Meeting   Meeting Type Daily Rounds   Team Members Present   Team Members Present Physician;Nurse;; Other (Discipline and Name)   Physician Team Member Dr Mikal Plascencia Team Member Rory Montero RN   Care Management Team Member Ebony Boucher   Other (Discipline and Name) Shanta Jacques LCSW     No changes  Eating 25% at breakfast, refused lunch and dinner

## 2020-04-30 NOTE — PLAN OF CARE
Problem: Alteration in Thoughts and Perception  Goal: Verbalize thoughts and feelings  Description  Interventions:  - Promote a nonjudgmental and trusting relationship with the patient through active listening and therapeutic communication  - Assess patient's level of functioning, behavior and potential for risk  - Engage patient in 1 on 1 interactions for a minimum of 15 minutes each session  - Encourage patient to express fears, feelings, frustrations, and discuss symptoms    - [place] patient to reality, help patient recognize reality-based thinking   - Administer medications as ordered and assess for potential side effects  - Provide the patient education related to the signs and symptoms of the illness and desired effects of prescribed medications  Outcome: Progressing  Goal: Agree to be compliant with medication regime, as prescribed and report medication side effects  Description  Interventions:  - Offer appropriate PRN medication and supervise ingestion; conduct aims, as needed   Outcome: Progressing  Goal: Attend and participate in unit activities, including therapeutic, recreational, and educational groups  Description  Interventions:  - Provide therapeutic and educational activities daily, encourage attendance and participation, and document same in the medical record     CERTIFIED PEER SPECIALIST INTERVENTIONS:    Complete peer assessment with patient to assess their needs and identify their goals to complete while in the recovery program as well as once discharged into the community  Patient will complete WRAP Plan, Crisis Plan and [facility] Domains  Patient will attend 50% of groups offered on the unit  Patient will complete a goal card weekly      Outcome: Progressing     Problem: Ineffective Coping  Goal: Understands least restrictive measures  Description  Interventions:  - Utilize least restrictive behavior  Outcome: Progressing     Problem: Risk for Self Injury/Neglect  Goal: Treatment Goal: Remain safe during length of stay, learn and adopt new coping skills, and be free of self-injurious ideation, impulses and acts at the time of discharge  Outcome: Progressing  Goal: Verbalize thoughts and feelings  Description  Interventions:  - Assess and re-assess patient's lethality and potential for self-injury  - Engage patient in 1:1 interactions, daily, for a minimum of 15 minutes  - Encourage patient to express feelings, fears, frustrations, hopes  - Establish rapport/trust with patient   Outcome: Progressing  Goal: Refrain from harming self  Description  Interventions:  - Monitor patient closely, per order  - Develop a trusting relationship  - Supervise medication ingestion, monitor effects and side effects   Outcome: Progressing  Goal: Attend and participate in unit activities, including therapeutic, recreational, and educational groups  Description  Interventions:  - Provide therapeutic and educational activities daily, encourage attendance and participation, and document same in the medical record  - Obtain collateral information, encourage visitation and family involvement in care   Outcome: Progressing     Problem: Depression  Goal: Verbalize thoughts and feelings  Description  Interventions:  - Assess and re-assess patient's level of risk   - Engage patient in 1:1 interactions, daily, for a minimum of 15 minutes   - Encourage patient to express feelings, fears, frustrations, hopes   Outcome: Progressing     Problem: Anxiety  Goal: Anxiety is at manageable level  Description  Interventions:  - Assess and monitor patient's anxiety level  - Monitor for signs and symptoms of anxiety both physical and emotional (heart palpitations, chest pain, shortness of breath, headaches, nausea, feeling jumpy, restlessness, irritable, apprehensive)  - Collaborate with interdisciplinary team and initiate plan and interventions as ordered    - [place] patient to unit/surroundings  - Explain treatment plan  - Encourage participation in care  - Encourage verbalization of concerns/fears  - Identify coping mechanisms  - Assist in developing anxiety-reducing skills  - Administer/offer alternative therapies  - Limit or eliminate stimulants  Outcome: Progressing     Problem: Alteration in Orientation  Goal: Interact with staff daily  Description  Interventions:  - Assess and re-assess patient's level of orientation  - Engage patient in 1 on 1 interactions, daily, for a minimum of 15 minutes   - Establish rapport/trust with patient   Outcome: Progressing     Problem: PAIN - ADULT  Goal: Verbalizes/displays adequate comfort level or baseline comfort level  Description  Interventions:  - Encourage patient to monitor pain and request assistance  - Assess pain using appropriate pain scale  - Administer analgesics based on type and severity of pain and evaluate response  - Implement non-pharmacological measures as appropriate and evaluate response  - Consider cultural and social influences on pain and pain management  - Notify physician/advanced practitioner if interventions unsuccessful or patient reports new pain  Outcome: Progressing     Problem: SAFETY ADULT  Goal: Patient will remain free of falls  Description  INTERVENTIONS:  - Assess patient frequently for physical needs  -  Identify cognitive and physical deficits and behaviors that affect risk of falls    -  [place] fall precautions as indicated by assessment   - Educate patient/family on patient safety including physical limitations  - Instruct patient to call for assistance with activity based on assessment  - Modify environment to reduce risk of injury  - Consider OT/PT consult to assist with strengthening/mobility  Outcome: Progressing     Problem: Alteration in Thoughts and Perception  Goal: Treatment Goal: Gain control of psychotic behaviors/thinking, reduce/eliminate presenting symptoms and demonstrate improved reality functioning upon discharge  Outcome: Not Progressing  Goal: Recognize dysfunctional thoughts, communicate reality-based thoughts at the time of discharge  Description  Interventions:  - Provide medication and psycho-education to assist patient in compliance and developing insight into his/her illness   Outcome: Not Progressing  Goal: Complete daily ADLs, including personal hygiene independently, as able  Description  Interventions:  - Observe, teach, and assist patient with ADLS  - Monitor and promote a balance of rest/activity, with adequate nutrition and elimination   Outcome: Not Progressing     Problem: Risk for Self Injury/Neglect  Goal: Recognize maladaptive responses and adopt new coping mechanisms  Outcome: Not Progressing  Goal: Complete daily ADLs, including personal hygiene independently, as able  Description  Interventions:  - Observe, teach, and assist patient with ADLS  - Monitor and promote a balance of rest/activity, with adequate nutrition and elimination  Outcome: Not Progressing     Problem: Depression  Goal: Treatment Goal: Demonstrate behavioral control of depressive symptoms, verbalize feelings of improved mood/affect, and adopt new coping skills prior to discharge  Outcome: Not Progressing  Goal: Refrain from isolation  Description  Interventions:  - Develop a trusting relationship   - Encourage socialization   Outcome: Not Progressing  Goal: Refrain from self-neglect  Outcome: Not Progressing     Problem: Nutrition/Hydration-ADULT  Goal: Nutrient/Hydration intake appropriate for improving, restoring or maintaining nutritional needs  Description  Monitor and assess patient's nutrition/hydration status for malnutrition  Collaborate with interdisciplinary team and initiate plan and interventions as ordered  Monitor patient's weight and dietary intake as ordered or per policy  Utilize nutrition screening tool and intervene as necessary  Determine patient's food preferences and provide high-protein, high-caloric foods as appropriate  INTERVENTIONS:  - Monitor oral intake, urinary output, labs, and treatment plans  - Assess nutrition and hydration status and recommend course of action  - Evaluate amount of meals eaten  - Assist patient with eating if necessary   - Allow adequate time for meals  - Recommend/ encourage appropriate diets, oral nutritional supplements, and vitamin/mineral supplements  - Order, calculate, and assess calorie counts as needed  - Recommend, monitor, and adjust tube feedings and TPN/PPN based on assessed needs  - Assess need for intravenous fluids  - Provide specific nutrition/hydration education as appropriate  - Include patient/family/caregiver in decisions related to nutrition  Outcome: Not Progressing   Mostly isolative to her bed in her room but did attend [facility] for the second day in a row  Remains delusional and somatically preoccupied, overheard staff talking to a patient about his blood sugar and then asked if she could be checked because she thought she was hypoglycemic  Explained to patient that she was not showing any symptoms of hypoglycemia but he may be dehydrated from not drinking enough water  Ate poorly only eating 25% of breakfast and refused lunch  No other behaviors or issues noted  Med compliant  Continue to monitor

## 2020-04-30 NOTE — PLAN OF CARE
Problem: Alteration in Thoughts and Perception  Goal: Agree to be compliant with medication regime, as prescribed and report medication side effects  Description  Interventions:  - Offer appropriate PRN medication and supervise ingestion; conduct aims, as needed   Outcome: Progressing     Problem: Alteration in Thoughts and Perception  Goal: Complete daily ADLs, including personal hygiene independently, as able  Description  Interventions:  - Observe, teach, and assist patient with ADLS  - Monitor and promote a balance of rest/activity, with adequate nutrition and elimination   Outcome: Not Progressing     Problem: Depression  Goal: Refrain from isolation  Description  Interventions:  - Develop a trusting relationship   - Encourage socialization   Outcome: Not Progressing     Problem: Nutrition/Hydration-ADULT  Goal: Nutrient/Hydration intake appropriate for improving, restoring or maintaining nutritional needs  Description  Monitor and assess patient's nutrition/hydration status for malnutrition  Collaborate with interdisciplinary team and initiate plan and interventions as ordered  Monitor patient's weight and dietary intake as ordered or per policy  Utilize nutrition screening tool and intervene as necessary  Determine patient's food preferences and provide high-protein, high-caloric foods as appropriate       INTERVENTIONS:  - Monitor oral intake, urinary output, labs, and treatment plans  - Assess nutrition and hydration status and recommend course of action  - Evaluate amount of meals eaten  - Assist patient with eating if necessary   - Allow adequate time for meals  - Recommend/ encourage appropriate diets, oral nutritional supplements, and vitamin/mineral supplements  - Order, calculate, and assess calorie counts as needed  - Recommend, monitor, and adjust tube feedings and TPN/PPN based on assessed needs  - Assess need for intravenous fluids  - Provide specific nutrition/hydration education as appropriate  - Include patient/family/caregiver in decisions related to nutrition  Outcome: Not Efe Kurtz continues to isolate in room in bed  Unkempt, no initiative to shower (last shower 4 days ago)  Came out as late as possible for meal, eating only 10% (bites of mashed potato w/which was unhappy as not enough gravy) & Ensure  Did accept her supper medicine  She is pleasant, but, low energy, poorly motivated  Has been encouraged to get OOB, move around, go for water when it is called hourly so better hydrated, but, makes no effort

## 2020-04-30 NOTE — PROGRESS NOTES
December 22, 2017    Patient: Marii Calderon   Date of Visit: 12/22/2017       To Whom It May Concern:    Marii Calderon was seen and treated in our emergency department on 12/22/2017. He should not return to work until 12/24/17.     If you have any questio Psychiatry Progress Note [address] 60 y o  female MRN: [id number]  [facility] Encounter: [id number]  Code Status: Level 1 - Full Code    PCP: Sheron Rangel PA-C    Date of Admission:  7/23/2019 1730   Date of Service:  04/30/20  Patient Active Problem List   Diagnosis    COPD with asthma ([facility] [address] )    Tobacco use disorder, continuous    Compression fracture of L4 lumbar vertebra    Ventral hernia    Acute on chronic respiratory failure with hypoxia ([facility] )    Sch[facility]oaffective disorder, bipolar type ([email or web address]    Acquired hypothyroidism    Gastroesophageal reflux disease without esophagitis    Abnormal CT of the chest    Excessive cerumen in left ear canal    Lipoma of right upper extremity    Noncompliant with deep vein thrombosis (DVT) prophylaxis    At risk for aspiration    Ear ache    Tachycardia    Chest pain    Low HDL (under 40)     Diagnosis schizoaffective bipolar    Assessment   Overall Status:  Continues to be psychosomatic anxious poorly groomed having taken no showers since Sunday and refusing to attend most of the groups now fearful that she may have cancer   Certification Statement to demonstrate a period of stability by attending to ADLs and becoming less psychosomatic in attending more groups Acceptance by patient:  Accepting  Viv Pena in recovery:  Living at another personal care home   Understanding of medications: Yes    Involved in reintegration process: On hold due to [address] in relationship with psychiatrist:  Trusting sometimes    Medication changes    None today    Non-pharmacological treatments   Continue with individual, group, milieu and occupational therapy using recovery principles and psycho-education about accepting illness and the need for treatment   Encourage to take showers twice a week and cooperate with treatment and adl skills as per her behav   Plan   Another therapeutic pass with sister now that she did well  with the assigned staff escort to the park but it is now on hold due to [address] Prepare for the [address] [place] [address] and encouraged to accept  rather than refuse it    Safety   Safety and communication plan established to target dynamic risk factors discussed above  Discharge Plan   · back to a [facility] at [address]   Patient remains preoccupied psychosomatic isolated suspicious and believes she may have cancer claiming that she has a lump below her right ear  I checked the so called swelling which does not show any tenderness or in signs and inflammation at all  She continues to have other believes that she may die from heart attack or COVID-19 or shortness of breath or choking on food  She is still suspicious about certain staff and examines her pills before taking them and continues to suspect that they are tampering with her oxygen concentrator and spirometer  She has been working with the peer specialist in completing her wrap plan which is a big improvement  She does enjoy special attention reassurance and support and seems to be constantly seeking it and believes she cannot take showers on her own and needs assistance even though she is perfectly capable of doing it on her own according to staff  Her group attendance has slightly improved but way below expectations  She was told to continue to work on going back to [facility] personal care [place] rather than refuse it completely as she has no other place to go at this time  She was receptive to reassurance and support at the end of the interview      Group attendance:  Less than 25%  Sleep:   Fair  Appetite:   Fair but she picks and chooses her food but without loss of weight  Compliance with medications:  Good  Side effects:   none but claims to feel tired sometimes  Review of systems:  Continues to have psychosomatic symptoms     Mental Status Exam  Appearance: older than her stated age, poorly  groomed and not very well kept, with uncombed hairt but  wearing clean clothing   Anxious preoccupied  Has good eye contact friendly and pleasant today  Behavior:  Superficially friendly pleasant but anxious suspicious  Speech:  tangential at times with tendency to become stilted   Mood:   anxious irritated times,    Affect: increased in intensity range mood congruent redirectable  Thought Process:  Circumstantial with tendency to have stilted speech   Thought Content:  Continues to have some paranoia about motives of staff switching her medications or tampering with her inhalant or her oxygen concentrator off and on  She also has to examine her pills literally before taking them  No preoccupation with violence or suicide  No current suicidal homicidal thoughts intent or plans reported  No phobias but has obsessions and compulsions about examining her pills before she takes them  Glory Roman Psychosomatic about dying by choking from food and from heart attack or from shortness of breath and now from catching Covid-19  Perceptual Disturbances:  None reported   Risk Potential:  Ability to care for herself and refusal to take showers  Sensorium: fully oriented to place, person, time, date, day, month, year and to situation  Cognition:  Grossly intact, aware of current events like the corona virus situation, recent and remote memory intact    No language deficit  Consciousness:  Alert and awake  Attention:  fair  Intellect:  average  Insight:  limited  Judgment:  fair  Motor Activity: No abnormal involuntary movement noted today    Vitals  Temp:  [97 2 °F (36 2 °C)] 97 2 °F (36 2 °C)  HR:  [71] 71  Resp:  [18] 18  BP: (90)/(60) 90/60  SpO2:  [95 %] 95 %  No intake or output data in the 24 hours ending 04/30/20 0800    Lab Results: No New Labs Available For Today  Results from last 7 days   Lab Units 04/24/20  1028   WBC Thousand/uL 8 50   RBC Million/uL 4 64   HEMOGLOBIN g/dL 14 8   HEMATOCRIT % 43 5   MCV fL 94   PLATELETS Thousands/uL 183 NEUTROS ABS Thousands/µL 5 10         Current Facility-Administered Medications:  acetaminophen 325 mg Oral Q6H PRN Ervin Little MD   acetaminophen 650 mg Oral Q6H PRN Ervin Little MD   acetaminophen 650 mg Oral Q8H PRN Ervin Little MD   albuterol 2 puff Inhalation Q4H PRN Ervin Little MD   aluminum-magnesium hydroxide-simethicone 15 mL Oral Q4H PRN Ervin Little MD   ammonium lactate 1 application Topical BID PRN Ervin Little MD   benzonatate 100 mg Oral TID PRN Ervin Little MD   benztropine 1 mg Intramuscular Q8H PRN Ervin Little MD   carbamide peroxide 5 drop Left Ear BID PRN Ervin Little MD   cloZAPine 25 mg Oral BID Ervin Little MD   cloZAPine 50 mg Oral BID Ervin Little MD   docusate sodium 100 mg Oral BID PRN Ervin Little MD   EPINEPHrine PF 0 15 mg Intramuscular Once PRN Ervin Little MD   fluticasone-vilanterol 1 puff Inhalation Daily Ervin Little MD   ketotifen 1 drop Right Eye BID PRN Ervin Little MD   levothyroxine 125 mcg Oral Early Morning Ervin Little MD   magnesium hydroxide 30 mL Oral Daily PRN Ervin Little MD   montelukast 10 mg Oral HS Ervin Little MD   OLANZapine 5 mg Intramuscular Q8H PRN Ervin Little MD   OLANZapine 5 mg Oral Q8H PRN Ervin Little MD   ondansetron 4 mg Oral Q6H PRN Ervin Little MD   pantoprazole 40 mg Oral Early Morning Ervin Little MD   polyethylene glycol 17 g Oral Daily PRN Ervin Little MD   polyvinyl alcohol 1 drop Both Eyes Q3H PRN Ervin Little MD   sertraline 175 mg Oral Daily Ervin Little MD   sucralfate 1,000 mg Oral BID Ervin Little MD   theophylline 200 mg Oral Daily Ervin Little MD   tiotropium 18 mcg Inhalation Daily Ervin Little MD   traZODone 25 mg Oral HS PRN Ervin Little MD       Counseling / Coordination of Care: Total floor / unit time spent today 15 minutes  Greater than 50% of total time was spent with the patient and / or family counseling and / or somewhat receptive to supportive listening and teaching positive coping skills to deal with symptom mangement  Patient's Rights, confidentiality and exceptions to confidentiality, use of automated medical record, [address] staff access to medical record, and consent to treatment reviewed

## 2020-04-30 NOTE — PROGRESS NOTES
2145 Natalie Lopes continued to lay listlessly in bed  "I'm so-o tired " Refused to do her Incentive Spirometry tonight; "I'll pass " Reinforced w/her that long term inactivity coupled w/consistently poor intake makes for feeling tired, plus, failing to do the Incentive Spirometry makes for poor lung capacity, putting her @ risk for the pneumonia she fears  Did w/struggle get up for HS snack, but, had only milk  Did take her HS medicine except the Singulair  Wearing now her QHS humidified nasal O2 @ 1L for bed

## 2020-05-01 NOTE — PLAN OF CARE
Problem: DISCHARGE PLANNING  Goal: Discharge to home or other facility with appropriate resources  Description  INTERVENTIONS:  - Conduct assessment to determine patient/family and health care team treatment goals, and need for post-acute services based on payer coverage, community resources, and patient preferences, and barriers to discharge  - Address psychosocial, clinical, and financial barriers to discharge as identified in assessment in conjunction with the patient/family and health care team  - Assist the patient in reintegration back into the community by removing barriers which may hinder a successful discharge once deemed stable  - Arrange appropriate level of post-acute services according to patient's needs and preference and payer coverage in collaboration with the physician and health care team  - Communicate with and update the patient/family, physician, and health care team regarding progress on the discharge plan  - Arrange appropriate transportation to post-acute venues    Outcome: Progressing     Patient continues to go to bare minimum groups  Encouragement given with little response  No planned discharge

## 2020-05-01 NOTE — PROGRESS NOTES
04/30/20 1300   Activity/Group Checklist   Group Other (Comment)  (Virtual Drop[facility]In [facility])   Attendance Did not attend     This was an optional group, therefore patient's absence will not be counted negatively against group percentage

## 2020-05-01 NOTE — PROGRESS NOTES
2145 Alpha Mixer found her own way to get cup juice & 3 cups water during the shift; she manipulated a per joanna staff MHT get it for her instead of walking up herself to get it! Reminded her this is her responsibility which she admits, but, maintains is dizzy, weak, can't do these things for herself  Admits to poor intake, but, again w/fear of choking, preoccupied w/need for accu check  She did perform her Incentive Spirometry consistently reaching 1250ml volume  Has been perkier since had the extra fluids  Took her HS medicine except the Singulair  Debated about HS snack  Concluded, "When in doubt, don't " Said too full from fluids to eat a snack  Wearing now her QHS humidified nasal O2 @ 1L for bed

## 2020-05-01 NOTE — PROGRESS NOTES
05/01/20 1314   Team Meeting   Meeting Type Daily Rounds   Team Members Present   Team Members Present Physician;Nurse;; Other (Discipline and Name)   Physician Team Member Dr Neetu Woodward Team Member Drew Moreno, RN   Care Management Team Member Leann Diaz MSW   Other (Discipline and Name) Anthony Colindres LCSW     Believes she is dying, still somatic  Last shower 4/26, poor appetite

## 2020-05-01 NOTE — PROGRESS NOTES
Maggie maintained on ongoing fall and SAFE precaution  Dileep Hefty in bed with eyes closed, breath even and unlabored   On O2 with humidifier @1L/m via nasal cannula  Continues rounding implemented   No somatic complaint overnight  No PRN needed for sleep aid   No indication of pain or discomfort   In no distress   Will continue to monitor    VS 96 8  60  18  80/54  92%RA

## 2020-05-01 NOTE — PLAN OF CARE
Problem: Alteration in Thoughts and Perception  Goal: Verbalize thoughts and feelings  Description  Interventions:  - Promote a nonjudgmental and trusting relationship with the patient through active listening and therapeutic communication  - Assess patient's level of functioning, behavior and potential for risk  - Engage patient in 1 on 1 interactions for a minimum of 15 minutes each session  - Encourage patient to express fears, feelings, frustrations, and discuss symptoms    - [place] patient to reality, help patient recognize reality-based thinking   - Administer medications as ordered and assess for potential side effects  - Provide the patient education related to the signs and symptoms of the illness and desired effects of prescribed medications  Outcome: Progressing  Goal: Agree to be compliant with medication regime, as prescribed and report medication side effects  Description  Interventions:  - Offer appropriate PRN medication and supervise ingestion; conduct aims, as needed   Outcome: Progressing     Problem: Alteration in Thoughts and Perception  Goal: Attend and participate in unit activities, including therapeutic, recreational, and educational groups  Description  Interventions:  - Provide therapeutic and educational activities daily, encourage attendance and participation, and document same in the medical record     CERTIFIED PEER SPECIALIST INTERVENTIONS:    Complete peer assessment with patient to assess their needs and identify their goals to complete while in the recovery program as well as once discharged into the community  Patient will complete WRAP Plan, Crisis Plan and 5 Life Domains  Patient will attend 50% of groups offered on the unit  Patient will complete a goal card weekly      Outcome: Not Progressing  Goal: Complete daily ADLs, including personal hygiene independently, as able  Description  Interventions:  - Observe, teach, and assist patient with ADLS  - Monitor and promote a balance of rest/activity, with adequate nutrition and elimination   Outcome: Not Progressing     Problem: Depression  Goal: Refrain from isolation  Description  Interventions:  - Develop a trusting relationship   - Encourage socialization   Outcome: Jannette Noriega continues to isolate in her room in bed, though, will sit up in bed looking out door, &, has made the effort to go occasionally to kitchen for water when it is announced Q1H  She did not eat meal, but, drank the Ensure  Has not addressed hygiene in  5days now; unkempt  Did come for her supper medicine when prompted  Continues somatic w/perceptions of weakness, dizziness, fear of choking, fear of dropping glucose as consequence of poor intake  Was again encouraged to make consistent effort to be active, get OOB, eat & drink   Did not respond to invitations to optional Fiksu group or PM Movie Social

## 2020-05-01 NOTE — PROGRESS NOTES
05/01/20 1100   Activity/Group Checklist   Group Other (Comment)  (IMR - Music Appreciation)   Attendance Did not attend     Patient encouraged to attend group, but declined  In bed

## 2020-05-01 NOTE — PLAN OF CARE
Problem: Alteration in Thoughts and Perception  Goal: Verbalize thoughts and feelings  Description  Interventions:  - Promote a nonjudgmental and trusting relationship with the patient through active listening and therapeutic communication  - Assess patient's level of functioning, behavior and potential for risk  - Engage patient in 1 on 1 interactions for a minimum of 15 minutes each session  - Encourage patient to express fears, feelings, frustrations, and discuss symptoms    - [place] patient to reality, help patient recognize reality-based thinking   - Administer medications as ordered and assess for potential side effects  - Provide the patient education related to the signs and symptoms of the illness and desired effects of prescribed medications  Outcome: Progressing  Goal: Agree to be compliant with medication regime, as prescribed and report medication side effects  Description  Interventions:  - Offer appropriate PRN medication and supervise ingestion; conduct aims, as needed   Outcome: Progressing     Problem: Alteration in Orientation  Goal: Interact with staff daily  Description  Interventions:  - Assess and re-assess patient's level of orientation  - Engage patient in 1 on 1 interactions, daily, for a minimum of 15 minutes   - Establish rapport/trust with patient   Outcome: Progressing     Problem: SAFETY ADULT  Goal: Patient will remain free of falls  Description  INTERVENTIONS:  - Assess patient frequently for physical needs  -  Identify cognitive and physical deficits and behaviors that affect risk of falls    -  [place] fall precautions as indicated by assessment   - Educate patient/family on patient safety including physical limitations  - Instruct patient to call for assistance with activity based on assessment  - Modify environment to reduce risk of injury  - Consider OT/PT consult to assist with strengthening/mobility  Outcome: Progressing     Problem: Alteration in Thoughts and Perception  Goal: Attend and participate in unit activities, including therapeutic, recreational, and educational groups  Description  Interventions:  - Provide therapeutic and educational activities daily, encourage attendance and participation, and document same in the medical record     CERTIFIED PEER SPECIALIST INTERVENTIONS:    Complete peer assessment with patient to assess their needs and identify their goals to complete while in the recovery program as well as once discharged into the community  Patient will complete WRAP Plan, Crisis Plan and 5 Life Domains  Patient will attend 50% of groups offered on the unit  Patient will complete a goal card weekly  Outcome: Not Progressing  Goal: Complete daily ADLs, including personal hygiene independently, as able  Description  Interventions:  - Observe, teach, and assist patient with ADLS  - Monitor and promote a balance of rest/activity, with adequate nutrition and elimination   Outcome: Not Progressing     Problem: Ineffective Coping  Goal: Participates in unit activities  Description  Interventions:  - Provide therapeutic environment   - Provide required programming   - Redirect inappropriate behaviors   Outcome: Not Merrick Sanchez has been isolative to her room and self most of the shift  Somatic today  "I don't feel well  I am so weak  I don't know what is wrong " Reminded her that she needs to not be in bed laying down all day and that she should be eating/drinking more  Appetite poor  Has not mentioned any swallowing issues  Took pills without difficulty  Minimal interaction with peers  Able to make needs known  Refused breakfast and only had 10% of lunch (pudding and apple juice)  She did go to the nutrition room for water this afternoon  Did not attend any groups  Continue to monitor  Precautions maintained

## 2020-05-01 NOTE — PROGRESS NOTES
Psychiatry Progress Note [address] 60 y o  female MRN: [id number]  Unit/Bed#: [facility]GUNNAR ADA[facility] 400-11 Encounter: [id number]  Code Status: Level 1 - Full Code    PCP: Dakota Frankel PA-C    Date of Admission:  7/23/2019 1114   Date of Service:  05/01/20  Patient Active Problem List   Diagnosis    COPD with asthma ([facility] [address] )    Tobacco use disorder, continuous    Compression fracture of L4 lumbar vertebra    Ventral hernia    Acute on chronic respiratory failure with hypoxia ([facility] [address] )    Schizoaffective disorder, bipolar type ([facility] [address] )    Acquired hypothyroidism    Gastroesophageal reflux disease without esophagitis    Abnormal CT of the chest    Excessive cerumen in left ear canal    Lipoma of right upper extremity    Noncompliant with deep vein thrombosis (DVT) prophylaxis    At risk for aspiration    Ear ache    Tachycardia    Chest pain    Low HDL (under 40)     Diagnosis schizoaffective bipolar    Assessment   Overall Status:  Again no showers since 4/26  She is refusing to get OOB and expects people to bring her water and things to her, believes she is dying and psychosomatic    Certification Statement to demonstrate a period of stability by attending to ADLs and becoming less psychosomatic in attending more groups Acceptance by patient:  Accepting  Camille Callahan in recovery:  Living at another personal care home   Understanding of medications: Yes    Involved in reintegration process: On hold due to [address] in relationship with psychiatrist:  Trusting sometimes    Medication changes    None today    Non-pharmacological treatments   Continue with individual, group, milieu and occupational therapy using recovery principles and psycho-education about accepting illness and the need for treatment   Encourage to take showers twice a week and cooperate with treatment and adl skills as per her behav   Plan   Another therapeutic pass with sister now that she did well  with the assigned staff escort to the park but it is now on hold due to [address] Prepare for the [address] [place] [address] and encouraged to accept  rather than refuse it    Safety   Safety and communication plan established to target dynamic risk factors discussed above  Discharge Plan   · back to a [facility] at [address]   Again refused showers yesterday claiming that she is dying and expects people to cater to her by bringing her water etc  She continues to have same delusions about having heart problems, SOB, cancer etc  Not eating all meals , stays to self, appears fearful, anxious and frightened  Not easily redirectable but responsive to verbal support and reassurance  Still focussed on a lump under her right ear which is not at al inflamed or tender or enlarged or warm to touch and it is hardly visible anymore  Still suspicious as to motives of staff and examines her pills before taking them   She does enjoy the special attention from staff  Continues to question if she can make it at the [address] [place] [address] despite reminders she needs to work on it as she is fully capable of doing so despite her anxieties and fears  She had refused to eat her meals and also refused to drink enough fluids so that her blood pressure was low yesterday and she was again reminded that she needs to eat and drink fluids to bring the blood pressure up and and then she did agree  Group attendance:  Less than 25%  Sleep:   Fair  Appetite:   Fair but she picks and chooses her food and skip breakfast this morning  Compliance with medications:  Good  Side effects:   none but claims to feel tired sometimes  Review of systems:  Continues to have psychosomatic symptoms     Mental Status Exam  Appearance:  Found laying on bed but sat up when approached   Looks older than her stated age, poorly  groomed and not very well kept, with uncombed hairt but  wearing clean clothing   Anxious preoccupied    Has good eye contact friendly and pleasant today  Behavior:  Superficially friendly pleasant but anxious suspicious  Speech:  tangential at times with tendency to become stilted   Mood:   anxious irritated times,    Affect: increased in intensity range mood congruent redirectable  Thought Process:  Circumstantial with tendency to have stilted speech   Thought Content:  Continues to have some paranoia about motives of staff switching her medications or tampering with her inhalant or her oxygen concentrator off and on  She also has to examine her pills literally before taking them  No preoccupation with violence or suicide  No current suicidal homicidal thoughts intent or plans reported  No phobias but has obsessions and compulsions about examining her pills before she takes them  Tyrone Kenrick Psychosomatic about dying by choking from food and from heart attack or from shortness of breath and now from catching Covid-19  Perceptual Disturbances:  None reported   Risk Potential:  Ability to care for herself and refusal to take showers  Sensorium: fully oriented to place, person, time, date, day, month, year and to situation  Cognition:  Grossly intact, aware of current events like the corona virus situation, recent and remote memory intact    No language deficit  Consciousness:  Alert and awake  Attention:  fair  Intellect:  average  Insight:  limited  Judgment:  fair  Motor Activity: No abnormal involuntary movement noted today    Vitals  Temp:  [96 8 °F (36 °C)-97 5 °F (36 4 °C)] 96 8 °F (36 °C)  HR:  [61-76] 61  Resp:  [14-18] 18  BP: (80-90)/(53-54) 80/53  SpO2:  [92 %-93 %] 92 %  No intake or output data in the 24 hours ending 05/01/20 0859    Lab Results: No New Labs Available For Today  Results from last 7 days   Lab Units 04/24/20  1028   WBC Thousand/uL 8 50   RBC Million/uL 4 64   HEMOGLOBIN g/dL 14 8   HEMATOCRIT % 43 5   MCV fL 94   PLATELETS Thousands/uL 183   NEUTROS ABS Thousands/µL 5 10         Current Facility-Administered Medications:  acetaminophen 325 mg Oral Q6H PRN Deep Durand MD   acetaminophen 650 mg Oral Q6H PRN Deep Durand MD   acetaminophen 650 mg Oral Q8H PRN Deep Durand MD   albuterol 2 puff Inhalation Q4H PRN Deep Durand MD   aluminum-magnesium hydroxide-simethicone 15 mL Oral Q4H PRN Deep Durand MD   ammonium lactate 1 application Topical BID PRN Deep Durand MD   benzonatate 100 mg Oral TID PRN Deep Durand MD   benztropine 1 mg Intramuscular Q8H PRN Deep Durand MD   carbamide peroxide 5 drop Left Ear BID PRN Deep Durand MD   cloZAPine 25 mg Oral BID Deep Durand MD   cloZAPine 50 mg Oral BID Deep Durand MD   docusate sodium 100 mg Oral BID PRN Deep Durand MD   EPINEPHrine PF 0 15 mg Intramuscular Once PRN Deep Durand MD   fluticasone-vilanterol 1 puff Inhalation Daily Deep Durand MD   ketotifen 1 drop Right Eye BID PRN Deep Durand MD   levothyroxine 125 mcg Oral Early Morning Deep Durand MD   magnesium hydroxide 30 mL Oral Daily PRN Deep Durand MD   montelukast 10 mg Oral HS Deep Durand MD   OLANZapine 5 mg Intramuscular Q8H PRN Deep Durand MD   OLANZapine 5 mg Oral Q8H PRN Deep Durand MD   ondansetron 4 mg Oral Q6H PRN Deep Durand MD   pantoprazole 40 mg Oral Early Morning Deep Durand MD   polyethylene glycol 17 g Oral Daily PRN Deep Durand MD   polyvinyl alcohol 1 drop Both Eyes Q3H PRN Deep Durand MD   sertraline 175 mg Oral Daily Deep Durand MD   sucralfate 1,000 mg Oral BID Deep Durand MD   theophylline 200 mg Oral Daily Deep Durand MD   tiotropium 18 mcg Inhalation Daily Deep Durand MD   traZODone 25 mg Oral HS PRN Deep Durand MD       Counseling / Coordination of Care: Total floor / unit time spent today 15 minutes  Greater than 50% of total time was spent with the patient and / or family counseling and / or somewhat receptive to supportive listening and teaching positive coping skills to deal with symptom mangement       Patient's Rights, confidentiality and exceptions to confidentiality, use of automated medical record, [address] staff access to medical record, and consent to treatment reviewed

## 2020-05-01 NOTE — PROGRESS NOTES
04/30/20 1100   Activity/Group Checklist   Group Other (Comment)  (IMR - CBT/Socratic Questioning)   Attendance Attended   Attendance Duration (min) 46-60   Interactions Interacted appropriately   Affect/Mood Appropriate   Goals Achieved Identified feelings; Able to manage/cope with feelings; Able to self-disclose; Able to recieve feedback; Able to give feedback to another;Able to listen to others; Able to engage in interactions; Other (Comment)  (Mindful questioning)

## 2020-05-02 NOTE — PROGRESS NOTES
2145 Radu Helms self assesses that she is beginning to feel some better  Hoping will get on trays tomorrow food choices she likes  Looking forward to attending groups on the weekend  Came to kitchen twice this shift for water, had a large cup of cranberry juice for HS snack  Came for HS medicine except the Singulair  Used the [employer] & achieved consistent volumes of 1250ml  Wearing now her QHS humidified nasal O2 @ 1L for bed

## 2020-05-02 NOTE — NURSING NOTE
Maintained on fall and safe precautions  O2 at 1 l/m via nasal cannula   VS 96 6  20 PO 96%   Monitored on Q 7 minute safety checks, appeared to sleep through the night

## 2020-05-02 NOTE — PLAN OF CARE
Problem: Alteration in Thoughts and Perception  Goal: Verbalize thoughts and feelings  Description  Interventions:  - Promote a nonjudgmental and trusting relationship with the patient through active listening and therapeutic communication  - Assess patient's level of functioning, behavior and potential for risk  - Engage patient in 1 on 1 interactions for a minimum of 15 minutes each session  - Encourage patient to express fears, feelings, frustrations, and discuss symptoms    - [place] patient to reality, help patient recognize reality-based thinking   - Administer medications as ordered and assess for potential side effects  - Provide the patient education related to the signs and symptoms of the illness and desired effects of prescribed medications  Outcome: Progressing  Goal: Agree to be compliant with medication regime, as prescribed and report medication side effects  Description  Interventions:  - Offer appropriate PRN medication and supervise ingestion; conduct aims, as needed   Outcome: Progressing     Problem: Ineffective Coping  Goal: Patient/Family verbalizes awareness of resources  Outcome: Progressing  Goal: Understands least restrictive measures  Description  Interventions:  - Utilize least restrictive behavior  Outcome: Progressing     Problem: Risk for Self Injury/Neglect  Goal: Treatment Goal: Remain safe during length of stay, learn and adopt new coping skills, and be free of self-injurious ideation, impulses and acts at the time of discharge  Outcome: Progressing  Goal: Verbalize thoughts and feelings  Description  Interventions:  - Assess and re-assess patient's lethality and potential for self-injury  - Engage patient in 1:1 interactions, daily, for a minimum of 15 minutes  - Encourage patient to express feelings, fears, frustrations, hopes  - Establish rapport/trust with patient   Outcome: Progressing  Goal: Refrain from harming self  Description  Interventions:  - Monitor patient closely, per order  - Develop a trusting relationship  - Supervise medication ingestion, monitor effects and side effects   Outcome: Progressing     Problem: Depression  Goal: Verbalize thoughts and feelings  Description  Interventions:  - Assess and re-assess patient's level of risk   - Engage patient in 1:1 interactions, daily, for a minimum of 15 minutes   - Encourage patient to express feelings, fears, frustrations, hopes   Outcome: Progressing     Problem: Alteration in Orientation  Goal: Interact with staff daily  Description  Interventions:  - Assess and re-assess patient's level of orientation  - Engage patient in 1 on 1 interactions, daily, for a minimum of 15 minutes   - Establish rapport/trust with patient   Outcome: Progressing     Problem: PAIN - ADULT  Goal: Verbalizes/displays adequate comfort level or baseline comfort level  Description  Interventions:  - Encourage patient to monitor pain and request assistance  - Assess pain using appropriate pain scale  - Administer analgesics based on type and severity of pain and evaluate response  - Implement non-pharmacological measures as appropriate and evaluate response  - Consider cultural and social influences on pain and pain management  - Notify physician/advanced practitioner if interventions unsuccessful or patient reports new pain  Outcome: Progressing     Problem: SAFETY ADULT  Goal: Patient will remain free of falls  Description  INTERVENTIONS:  - Assess patient frequently for physical needs  -  Identify cognitive and physical deficits and behaviors that affect risk of falls    -  [place] fall precautions as indicated by assessment   - Educate patient/family on patient safety including physical limitations  - Instruct patient to call for assistance with activity based on assessment  - Modify environment to reduce risk of injury  - Consider OT/PT consult to assist with strengthening/mobility  Outcome: Progressing     Problem: Alteration in Thoughts and Perception  Goal: Treatment Goal: Gain control of psychotic behaviors/thinking, reduce/eliminate presenting symptoms and demonstrate improved reality functioning upon discharge  Outcome: Not Progressing  Goal: Attend and participate in unit activities, including therapeutic, recreational, and educational groups  Description  Interventions:  - Provide therapeutic and educational activities daily, encourage attendance and participation, and document same in the medical record     CERTIFIED PEER SPECIALIST INTERVENTIONS:    Complete peer assessment with patient to assess their needs and identify their goals to complete while in the recovery program as well as once discharged into the community  Patient will complete WRAP Plan, Crisis Plan and 5 Life Domains  Patient will attend 50% of groups offered on the unit  Patient will complete a goal card weekly      Outcome: Not Progressing  Goal: Recognize dysfunctional thoughts, communicate reality-based thoughts at the time of discharge  Description  Interventions:  - Provide medication and psycho-education to assist patient in compliance and developing insight into his/her illness   Outcome: Not Progressing  Goal: Complete daily ADLs, including personal hygiene independently, as able  Description  Interventions:  - Observe, teach, and assist patient with ADLS  - Monitor and promote a balance of rest/activity, with adequate nutrition and elimination   Outcome: Not Progressing     Problem: Ineffective Coping  Goal: Participates in unit activities  Description  Interventions:  - Provide therapeutic environment   - Provide required programming   - Redirect inappropriate behaviors   Outcome: Not Progressing     Problem: Risk for Self Injury/Neglect  Goal: Attend and participate in unit activities, including therapeutic, recreational, and educational groups  Description  Interventions:  - Provide therapeutic and educational activities daily, encourage attendance and participation, and document same in the medical record  - Obtain collateral information, encourage visitation and family involvement in care   Outcome: Not Progressing  Goal: Recognize maladaptive responses and adopt new coping mechanisms  Outcome: Not Progressing  Goal: Complete daily ADLs, including personal hygiene independently, as able  Description  Interventions:  - Observe, teach, and assist patient with ADLS  - Monitor and promote a balance of rest/activity, with adequate nutrition and elimination  Outcome: Not Progressing     Problem: Depression  Goal: Treatment Goal: Demonstrate behavioral control of depressive symptoms, verbalize feelings of improved mood/affect, and adopt new coping skills prior to discharge  Outcome: Not Progressing  Goal: Refrain from isolation  Description  Interventions:  - Develop a trusting relationship   - Encourage socialization   Outcome: Not Progressing  Goal: Refrain from self-neglect  Outcome: Not Progressing     Problem: Nutrition/Hydration-ADULT  Goal: Nutrient/Hydration intake appropriate for improving, restoring or maintaining nutritional needs  Description  Monitor and assess patient's nutrition/hydration status for malnutrition  Collaborate with interdisciplinary team and initiate plan and interventions as ordered  Monitor patient's weight and dietary intake as ordered or per policy  Utilize nutrition screening tool and intervene as necessary  Determine patient's food preferences and provide high-protein, high-caloric foods as appropriate       INTERVENTIONS:  - Monitor oral intake, urinary output, labs, and treatment plans  - Assess nutrition and hydration status and recommend course of action  - Evaluate amount of meals eaten  - Assist patient with eating if necessary   - Allow adequate time for meals  - Recommend/ encourage appropriate diets, oral nutritional supplements, and vitamin/mineral supplements  - Order, calculate, and assess calorie counts as needed  - Recommend, monitor, and adjust tube feedings and TPN/PPN based on assessed needs  - Assess need for intravenous fluids  - Provide specific nutrition/hydration education as appropriate  - Include patient/family/caregiver in decisions related to nutrition  Outcome: Not Progressing  Isolative to her bed in her room, out for meds and meals, did not attend groups  Delusional and paranoid, thinks staff is out to get her  "My severe agoraphobia keeps me from doing anything"  Ate poorly only eating 10% of breakfast and 10% of lunch  No other behaviors or issues noted  Med compliant  Continue to monitor

## 2020-05-02 NOTE — PROGRESS NOTES
Progress Note - Behavioral Health   Brandon Yang 60 y o  female MRN: [id number]  Unit/Bed#: [facility] 110-02 Encounter: [id number]    Assessment/Plan   Principal Problem:    [facility]ffective [facility], [facility] ([facility] )  Active Problems:    COPD with asthma ([facility] )    Acquired hypothyroidism    Gastroesophageal reflux disease without esophagitis    At risk for aspiration    Ear ache    Tachycardia    Chest pain    Low HDL (under 40)      Subjective:Patient was seen today for continuation of care, records reviewed and  patient was discussed with the morning case review team  Roosevelt Lozano is well known to this writer and was seen in her room, in bed resting  She reported sleeping well, poor appetite due to difficulty swallowing, but states she drinks her Ensure supplement, social with selective peers and states she will attend some groups that makes sense  Kaleigh Pennington remains somatic- had several complaints about staff and peers Aristides Caballero are treating me bad here and staff reports that she can be needy/demanding at times  Overall she was pleasant on approach, she denies suicidal ideation, denies hallucinations, denies paranoia and delusions  Kaleigh Pennington presented suspicious and had residual delusions about dying and unable to breathe  Elianerichard Lozano does not seem to be experiencing any manic symptoms during our encounter  She remains medication compliance and denies any side effects from medications  Will continue on current medication regimen  Vitals:  Vitals:    05/02/20 0522   BP:    Pulse:    Resp: 20   Temp: (!) 96 6 °F (35 9 °C)   SpO2: 96%       Laboratory results:    I have personally reviewed all pertinent laboratory/tests results    Most Recent Labs:   Lab Results   Component Value Date    WBC 8 50 04/24/2020    RBC 4 64 04/24/2020    HGB 14 8 04/24/2020    HCT 43 5 04/24/2020     04/24/2020    RDW 14 1 04/24/2020    NEUTROABS 5 10 04/24/2020    SODIUM 139 04/20/2020    K 3 9 04/20/2020     04/20/2020    CO2 29 04/20/2020    BUN 18 04/20/2020    CREATININE 0 69 04/20/2020    GLUC 88 04/20/2020    GLUF 88 04/20/2020    CALCIUM 9 2 04/20/2020    AST 18 02/29/2020    ALT 25 02/29/2020    ALKPHOS 116 02/29/2020    TP 7 4 02/29/2020    ALB 4 1 02/29/2020    TBILI 0 50 02/29/2020    CHOLESTEROL 210 (H) 10/25/2019    HDL 38 (L) 10/25/2019    TRIG 134 10/25/2019    LDLCALC 145 (H) 10/25/2019    [place] 172 10/25/2019    LITHIUM <0 2 (L) 05/01/2019    AMMONIA 13 02/22/2016    SAT2LMLCXSME 2 230 02/29/2020    FREET4 1 4 05/12/2015    RPR Non-Reactive 05/02/2019    HGBA1C 5 5 01/17/2019     01/17/2019       Psychiatric Review of Systems:    Behavior over the last 24 hours:  unchanged  Sleep: normal  Appetite: poor  Medication side effects: No  ROS: no complaints, denies any shortness of breath or chest pain and all other systems are negative      Mental Status Evaluation:    Appearance:  disheveled, marginal hygiene   Behavior:  pleasant, cooperative, calm   Speech:  increased latency of response, delayed, soft   Mood:  depressed, labile   Affect:  constricted   Thought Process:  concrete   Thought Content:  some paranoia, negative thinking, intrusive thoughts   Perceptual Disturbances: no auditory hallucinations, no visual hallucinations, denies when asked, does not appear responding to internal stimuli   Risk Potential: Suicidal ideation - None  Homicidal ideation - None  Potential for aggression - No   Memory:  recent and remote memory grossly intact   Consciousness:  alert and awake   Attention: attention span and concentration are age appropriate   Insight:  limited   Judgment: limited   Gait/Station: normal gait/station, normal balance   Motor Activity: no abnormal movements       Progress Toward Goals:     No significant improvement    Assessment/Plan   Principal Problem:    Schizoaffective disorder, bipolar type (HCC)  Active Problems:    COPD with asthma ([facility] )    Acquired hypothyroidism    Gastroesophageal reflux disease without esophagitis    At risk for aspiration    Ear ache    Tachycardia    Chest pain    Low HDL (under 40)      Recommended Treatment: Treatment plan and medication changes discussed and per the attending physician the plan is: 1  Continue with group therapy, milieu therapy and occupational therapy  2  Behavioral Health checks every 7 minutes  3  Continue with current medication regimen  4  Will review labs in the a m   5  Disposition Planning:    Behavioral Health Medications: all current active meds have been reviewed and continue current psychiatric medications        Current Facility-Administered Medications:  acetaminophen 325 mg Oral Q6H PRN [place] Seeds, MD   acetaminophen 650 mg Oral Q6H PRN Manuel Seeds, MD   acetaminophen 650 mg Oral Q8H PRN Manuel Seeds, MD   albuterol 2 puff Inhalation Q4H PRN [place] Seeds, MD   aluminum-magnesium hydroxide-simethicone 15 mL Oral Q4H PRN Manuel Seeds, MD   ammonium lactate 1 application Topical BID PRN Manuel Seeds, MD   benzonatate 100 mg Oral TID PRN Manuel Seeds, MD   benztropine 1 mg Intramuscular Q8H PRN Manuel Seeds, MD   carbamide peroxide 5 drop Left Ear BID PRN Manuel Seeds, MD   cloZAPine 25 mg Oral BID Manuel Seeds, MD   cloZAPine 50 mg Oral BID [place] Seeds, MD   docusate sodium 100 mg Oral BID PRN Manuel Seeds, MD   EPINEPHrine PF 0 15 mg Intramuscular Once PRN [place] Seeds, MD   fluticasone-vilanterol 1 puff Inhalation Daily [place] Seeds, MD   ketotifen 1 drop Right Eye BID PRN [place] Seeds, MD   levothyroxine 125 mcg Oral Early Morning Manuel Seeds, MD   magnesium hydroxide 30 mL Oral Daily PRN Manuel Seeds, MD   montelukast 10 mg Oral HS [place] Seeds, MD   OLANZapine 5 mg Intramuscular Q8H PRN Manuel Seeds, MD   OLANZapine 5 mg Oral Q8H PRN Manuel Seeds, MD   ondansetron 4 mg Oral Q6H PRN [place] Seeds, MD   pantoprazole 40 mg Oral Early Morning Manuel Seeds, MD   polyethylene glycol 17 g Oral Daily PRN [place] Seeds, MD   polyvinyl alcohol 1 drop Both Eyes Q3H PRN Carissa Willis MD   sertraline 175 mg Oral Daily Carissa Willis MD   sucralfate 1,000 mg Oral BID Carissa Willis MD   theophylline 200 mg Oral Daily Carissa Willis MD   tiotropium 18 mcg Inhalation Daily Carissa Willis MD   traZODone 25 mg Oral HS PRN Carissa Willis MD       Risks / Benefits of Treatment:     Risks, benefits, and possible side effects of medications explained to patient and patient verbalizes understanding and agreement for treatment  Counseling / Coordination of Care:     Patient's progress reviewed with nursing staff  Medications, treatment progress and treatment plan reviewed with patient  Supportive counseling provided to the patient            ABILIO White

## 2020-05-03 NOTE — PLAN OF CARE
Problem: Alteration in Thoughts and Perception  Goal: Verbalize thoughts and feelings  Description  Interventions:  - Promote a nonjudgmental and trusting relationship with the patient through active listening and therapeutic communication  - Assess patient's level of functioning, behavior and potential for risk  - Engage patient in 1 on 1 interactions for a minimum of 15 minutes each session  - Encourage patient to express fears, feelings, frustrations, and discuss symptoms    - [place] patient to reality, help patient recognize reality-based thinking   - Administer medications as ordered and assess for potential side effects  - Provide the patient education related to the signs and symptoms of the illness and desired effects of prescribed medications  Outcome: Progressing  Goal: Agree to be compliant with medication regime, as prescribed and report medication side effects  Description  Interventions:  - Offer appropriate PRN medication and supervise ingestion; conduct aims, as needed   Outcome: Progressing     Problem: Alteration in Orientation  Goal: Interact with staff daily  Description  Interventions:  - Assess and re-assess patient's level of orientation  - Engage patient in 1 on 1 interactions, daily, for a minimum of 15 minutes   - Establish rapport/trust with patient   Outcome: Progressing     Problem: SAFETY ADULT  Goal: Patient will remain free of falls  Description  INTERVENTIONS:  - Assess patient frequently for physical needs  -  Identify cognitive and physical deficits and behaviors that affect risk of falls    -  [place] fall precautions as indicated by assessment   - Educate patient/family on patient safety including physical limitations  - Instruct patient to call for assistance with activity based on assessment  - Modify environment to reduce risk of injury  - Consider OT/PT consult to assist with strengthening/mobility  Outcome: Progressing     Problem: Alteration in Thoughts and Perception  Goal: Attend and participate in unit activities, including therapeutic, recreational, and educational groups  Description  Interventions:  - Provide therapeutic and educational activities daily, encourage attendance and participation, and document same in the medical record     CERTIFIED PEER SPECIALIST INTERVENTIONS:    Complete peer assessment with patient to assess their needs and identify their goals to complete while in the recovery program as well as once discharged into the community  Patient will complete WRAP Plan, Crisis Plan and 5 Life Domains  Patient will attend 50% of groups offered on the unit  Patient will complete a goal card weekly  Outcome: Not Progressing  Goal: Complete daily ADLs, including personal hygiene independently, as able  Description  Interventions:  - Observe, teach, and assist patient with ADLS  - Monitor and promote a balance of rest/activity, with adequate nutrition and elimination   Outcome: Not Iggy Durand has been in her room a majority of the shift  Somatic  "I just don't feel well when I stand up"  Encouraged to sit up for a few minutes and then get out of bed  Also encouraged to eat and drink  After only drinking Ensure for supper she asked me if she lost weight since last week  "I don't want to lose any weight"  She went to her room to lay down after supper  Took medication without swallowing issue  No shower or BM tonight  Disheveled appearance  Did not attend group or go out for fresh air  Took HS medication with prompting  Only had cranberry juice for snack  Oxygen saturation 92% prior to humidified oxygen applied via nasal cannula at   Continue to monitor  Precautions maintained

## 2020-05-03 NOTE — PROGRESS NOTES
Progress Note - Behavioral Health   Brandon Yang 60 y o  female MRN: [id number]  Unit/Bed#: MARCIO ADA[facility] 110-02 Encounter: [id number]    Assessment/Plan   Principal Problem:    Schizoaffective disorder, bipolar type ([facility] 75 )  Active Problems:    COPD with [facility] ([facility] )    Acquired hypothyroidism    Gastroesophageal reflux disease without esophagitis    At risk for aspiration    Ear ache    Tachycardia    Chest pain    Low HDL (under 40)      Subjective:Patient was seen today for continuation of care, records reviewed and  patient was discussed with the morning case review team  Roosevelt Lozano remains calm, pleasant on approach; reported sleeping well, poor appetite continues; she states "I don't have the energy to get out of bed and even to eat"  Roosevelt Lozano remains isolative to her room with limited socialization with peers and will attend selective group activities  Roosevelt Lozano currently rates her depression and anxiety at 8/10 with 10 being the worst   We talked about current events - social isolation and she misses having visitors on the unit  Maggie somatic complaints and staff splitting continues this morning  Roosevelt Lozano denies endorsing any suicidal or homicidal ideation  Does not seem to be experiencing any manic symptoms but has residual delusions/paranoia symptoms during our encounter  She remains medication compliance and denies any side effects from medications  Will continue on current medication regimen  Vitals:  Vitals:    05/03/20 0700   BP: 97/56   Pulse: 67   Resp: 20   Temp: (!) 97 4 °F (36 3 °C)   SpO2:        Laboratory results:    I have personally reviewed all pertinent laboratory/tests results    Most Recent Labs:   Lab Results   Component Value Date    WBC 8 50 04/24/2020    RBC 4 64 04/24/2020    HGB 14 8 04/24/2020    HCT 43 5 04/24/2020     04/24/2020    RDW 14 1 04/24/2020    NEUTROABS 5 10 04/24/2020    SODIUM 139 04/20/2020    K 3 9 04/20/2020     04/20/2020    CO2 29 04/20/2020    BUN 18 04/20/2020    CREATININE 0 69 04/20/2020    GLUC 88 04/20/2020    GLUF 88 04/20/2020    CALCIUM 9 2 04/20/2020    AST 18 02/29/2020    ALT 25 02/29/2020    ALKPHOS 116 02/29/2020    TP 7 4 02/29/2020    ALB 4 1 02/29/2020    TBILI 0 50 02/29/2020    CHOLESTEROL 210 (H) 10/25/2019    HDL 38 (L) 10/25/2019    TRIG 134 10/25/2019    LDLCALC 145 (H) 10/25/2019    [place] 172 10/25/2019    LITHIUM <0 2 (L) 05/01/2019    AMMONIA 13 02/22/2016    AQX5STVMOYAK 2 230 02/29/2020    FREET4 1 4 05/12/2015    RPR Non-Reactive 05/02/2019    HGBA1C 5 5 01/17/2019     01/17/2019       Psychiatric Review of Systems:    Behavior over the last 24 hours:  unchanged  Sleep: normal  Appetite: normal  Medication side effects: No  ROS: no complaints, denies any shortness of breath or chest pain and all other systems are negative      Mental Status Evaluation:    Appearance:  disheveled, marginal hygiene   Behavior:  pleasant, cooperative, calm   Speech:  increased latency of response, delayed, soft   Mood:  depressed, labile   Affect:  constricted   Thought Process:  concrete   Thought Content:  some paranoia, negative thinking, intrusive thoughts   Perceptual Disturbances: no auditory hallucinations, no visual hallucinations, denies when asked, does not appear responding to internal stimuli   Risk Potential: Suicidal ideation - None at present, contracts for safety on the unit, would talk to staff if not feeling safe on the unit  Homicidal ideation - None  Potential for aggression - No   Memory:  recent and remote memory grossly intact   Consciousness:  alert and awake   Attention: attention span and concentration are age appropriate   Insight:  limited   Judgment: limited   Gait/Station: normal gait/station, normal balance   Motor Activity: no abnormal movements       Progress Toward Goals:     No significant improvement    Assessment/Plan   Principal Problem:    Schizoaffective disorder, bipolar type (HCC)  Active Problems: COPD with asthma ([facility] )    Acquired hypothyroidism    Gastroesophageal reflux disease without esophagitis    At risk for aspiration    Ear ache    Tachycardia    Chest pain    Low HDL (under 40)      Recommended Treatment: Treatment plan and medication changes discussed and per the attending physician the plan is: 1  Continue with group therapy, milieu therapy and occupational therapy  2  Behavioral Health checks every 7 minutes  3  Continue with current medication regimen  4  Will review labs in the a m   5  Disposition Planning:    Behavioral Health Medications: all current active meds have been reviewed and continue current psychiatric medications        Current Facility-Administered Medications:  acetaminophen 325 mg Oral Q6H PRN Henderson Seeds, MD   acetaminophen 650 mg Oral Q6H PRN Manuel Seeds, MD   acetaminophen 650 mg Oral Q8H PRN Manuel Seeds, MD   albuterol 2 puff Inhalation Q4H PRN Henderson Seeds, MD   aluminum-magnesium hydroxide-simethicone 15 mL Oral Q4H PRN Henderson Seeds, MD   ammonium lactate 1 application Topical BID PRN Henderson Seeds, MD   benzonatate 100 mg Oral TID PRN Manuel Seeds, MD   benztropine 1 mg Intramuscular Q8H PRN Henderson Seeds, MD   carbamide peroxide 5 drop Left Ear BID PRN Henderson Seeds, MD   cloZAPine 25 mg Oral BID Henderson Seeds, MD   cloZAPine 50 mg Oral BID Henderson Seeds, MD   docusate sodium 100 mg Oral BID PRN Henderson Seeds, MD   EPINEPHrine PF 0 15 mg Intramuscular Once PRN Manuel Seeds, MD   fluticasone-vilanterol 1 puff Inhalation Daily Henderson Seeds, MD   ketotifen 1 drop Right Eye BID PRN Manuel Seeds, MD   levothyroxine 125 mcg Oral Early Morning Henderson Seeds, MD   magnesium hydroxide 30 mL Oral Daily PRN Henderson Seeds, MD   montelukast 10 mg Oral HS Henderson Seeds, MD   OLANZapine 5 mg Intramuscular Q8H PRN Henderson Seeds, MD   OLANZapine 5 mg Oral Q8H PRN Henderson Seeds, MD   ondansetron 4 mg Oral Q6H PRN Manuel Seeds, MD   pantoprazole 40 mg Oral Early Morning Henderson Seeds, MD   polyethylene glycol 17 g Oral Daily PRN Allie Guzman MD   polyvinyl alcohol 1 drop Both Eyes Q3H PRN Allie Guzman MD   sertraline 175 mg Oral Daily Allie Guzman MD   sucralfate 1,000 mg Oral BID Allie Guzman MD   theophylline 200 mg Oral Daily Allie Guzman MD   tiotropium 18 mcg Inhalation Daily Allie Guzman MD   traZODone 25 mg Oral HS PRN Allie Guzman MD       Risks / Benefits of Treatment:     Risks, benefits, and possible side effects of medications explained to patient and patient verbalizes understanding and agreement for treatment  Counseling / Coordination of Care:     Patient's progress reviewed with nursing staff  Medications, treatment progress and treatment plan reviewed with patient  Supportive counseling provided to the patient            ABILIO Billingsley

## 2020-05-03 NOTE — PLAN OF CARE
Problem: Alteration in Thoughts and Perception  Goal: Agree to be compliant with medication regime, as prescribed and report medication side effects  Description  Interventions:  - Offer appropriate PRN medication and supervise ingestion; conduct aims, as needed   Outcome: Progressing       Compliant with routine medications with the exception of the hs dose of singular, ate 25% of dinner and and drank the ensure, less isolative today but did not attend group activities, polite cooperative, behavior controlled, will continue to monitor

## 2020-05-03 NOTE — PROGRESS NOTES
Maggie maintained on ongoing fall and SAFE precaution  [place] in bed with eyes closed, breath even and unlabored   On O2 with humidifier @1L/m via nasal cannula  Continues rounding implemented   No somatic complaint overnight  No PRN needed for sleep aid   No indication of pain or discomfort   In no distress   Will continue to monitor

## 2020-05-03 NOTE — PLAN OF CARE
Problem: Alteration in Thoughts and Perception  Goal: Verbalize thoughts and feelings  Description  Interventions:  - Promote a nonjudgmental and trusting relationship with the patient through active listening and therapeutic communication  - Assess patient's level of functioning, behavior and potential for risk  - Engage patient in 1 on 1 interactions for a minimum of 15 minutes each session  - Encourage patient to express fears, feelings, frustrations, and discuss symptoms    - [place] patient to reality, help patient recognize reality-based thinking   - Administer medications as ordered and assess for potential side effects  - Provide the patient education related to the signs and symptoms of the illness and desired effects of prescribed medications  Outcome: Progressing  Goal: Agree to be compliant with medication regime, as prescribed and report medication side effects  Description  Interventions:  - Offer appropriate PRN medication and supervise ingestion; conduct aims, as needed   Outcome: Progressing     Problem: Risk for Self Injury/Neglect  Goal: Treatment Goal: Remain safe during length of stay, learn and adopt new coping skills, and be free of self-injurious ideation, impulses and acts at the time of discharge  Outcome: Progressing  Goal: Verbalize thoughts and feelings  Description  Interventions:  - Assess and re-assess patient's lethality and potential for self-injury  - Engage patient in 1:1 interactions, daily, for a minimum of 15 minutes  - Encourage patient to express feelings, fears, frustrations, hopes  - Establish rapport/trust with patient   Outcome: Progressing  Goal: Refrain from harming self  Description  Interventions:  - Monitor patient closely, per order  - Develop a trusting relationship  - Supervise medication ingestion, monitor effects and side effects   Outcome: Progressing     Problem: Depression  Goal: Verbalize thoughts and feelings  Description  Interventions:  - Assess and re-assess patient's level of risk   - Engage patient in 1:1 interactions, daily, for a minimum of 15 minutes   - Encourage patient to express feelings, fears, frustrations, hopes   Outcome: Progressing     Problem: Anxiety  Goal: Anxiety is at manageable level  Description  Interventions:  - Assess and monitor patient's anxiety level  - Monitor for signs and symptoms of anxiety both physical and emotional (heart palpitations, chest pain, shortness of breath, headaches, nausea, feeling jumpy, restlessness, irritable, apprehensive)  - Collaborate with interdisciplinary team and initiate plan and interventions as ordered    - [place] patient to unit/surroundings  - Explain treatment plan  - Encourage participation in care  - Encourage verbalization of concerns/fears  - Identify coping mechanisms  - Assist in developing anxiety-reducing skills  - Administer/offer alternative therapies  - Limit or eliminate stimulants  Outcome: Progressing     Problem: Alteration in Orientation  Goal: Interact with staff daily  Description  Interventions:  - Assess and re-assess patient's level of orientation  - Engage patient in 1 on 1 interactions, daily, for a minimum of 15 minutes   - Establish rapport/trust with patient   Outcome: Progressing     Problem: PAIN - ADULT  Goal: Verbalizes/displays adequate comfort level or baseline comfort level  Description  Interventions:  - Encourage patient to monitor pain and request assistance  - Assess pain using appropriate pain scale  - Administer analgesics based on type and severity of pain and evaluate response  - Implement non-pharmacological measures as appropriate and evaluate response  - Consider cultural and social influences on pain and pain management  - Notify physician/advanced practitioner if interventions unsuccessful or patient reports new pain  Outcome: Progressing     Problem: SAFETY ADULT  Goal: Patient will remain free of falls  Description  INTERVENTIONS:  - Assess patient frequently for physical needs  -  Identify cognitive and physical deficits and behaviors that affect risk of falls  -  [place] fall precautions as indicated by assessment   - Educate patient/family on patient safety including physical limitations  - Instruct patient to call for assistance with activity based on assessment  - Modify environment to reduce risk of injury  - Consider OT/PT consult to assist with strengthening/mobility  Outcome: Progressing     Problem: Alteration in Thoughts and Perception  Goal: Treatment Goal: Gain control of psychotic behaviors/thinking, reduce/eliminate presenting symptoms and demonstrate improved reality functioning upon discharge  Outcome: Not Progressing  Goal: Attend and participate in unit activities, including therapeutic, recreational, and educational groups  Description  Interventions:  - Provide therapeutic and educational activities daily, encourage attendance and participation, and document same in the medical record     CERTIFIED PEER SPECIALIST INTERVENTIONS:    Complete peer assessment with patient to assess their needs and identify their goals to complete while in the recovery program as well as once discharged into the community  Patient will complete WRAP Plan, Crisis Plan and 5 Life Domains  Patient will attend 50% of groups offered on the unit  Patient will complete a goal card weekly      Outcome: Not Progressing  Goal: Recognize dysfunctional thoughts, communicate reality-based thoughts at the time of discharge  Description  Interventions:  - Provide medication and psycho-education to assist patient in compliance and developing insight into his/her illness   Outcome: Not Progressing  Goal: Complete daily ADLs, including personal hygiene independently, as able  Description  Interventions:  - Observe, teach, and assist patient with ADLS  - Monitor and promote a balance of rest/activity, with adequate nutrition and elimination   Outcome: Not Progressing     Problem: Ineffective Coping  Goal: Participates in unit activities  Description  Interventions:  - Provide therapeutic environment   - Provide required programming   - Redirect inappropriate behaviors   Outcome: Not Progressing     Problem: Risk for Self Injury/Neglect  Goal: Attend and participate in unit activities, including therapeutic, recreational, and educational groups  Description  Interventions:  - Provide therapeutic and educational activities daily, encourage attendance and participation, and document same in the medical record  - Obtain collateral information, encourage visitation and family involvement in care   Outcome: Not Progressing  Goal: Complete daily ADLs, including personal hygiene independently, as able  Description  Interventions:  - Observe, teach, and assist patient with ADLS  - Monitor and promote a balance of rest/activity, with adequate nutrition and elimination  Outcome: Not Progressing     Problem: Depression  Goal: Treatment Goal: Demonstrate behavioral control of depressive symptoms, verbalize feelings of improved mood/affect, and adopt new coping skills prior to discharge  Outcome: Not Progressing  Goal: Refrain from isolation  Description  Interventions:  - Develop a trusting relationship   - Encourage socialization   Outcome: Not Progressing  Goal: Refrain from self-neglect  Outcome: Not Progressing     Problem: Nutrition/Hydration-ADULT  Goal: Nutrient/Hydration intake appropriate for improving, restoring or maintaining nutritional needs  Description  Monitor and assess patient's nutrition/hydration status for malnutrition  Collaborate with interdisciplinary team and initiate plan and interventions as ordered  Monitor patient's weight and dietary intake as ordered or per policy  Utilize nutrition screening tool and intervene as necessary  Determine patient's food preferences and provide high-protein, high-caloric foods as appropriate       INTERVENTIONS:  - Monitor oral intake, urinary output, labs, and treatment plans  - Assess nutrition and hydration status and recommend course of action  - Evaluate amount of meals eaten  - Assist patient with eating if necessary   - Allow adequate time for meals  - Recommend/ encourage appropriate diets, oral nutritional supplements, and vitamin/mineral supplements  - Order, calculate, and assess calorie counts as needed  - Recommend, monitor, and adjust tube feedings and TPN/PPN based on assessed needs  - Assess need for intravenous fluids  - Provide specific nutrition/hydration education as appropriate  - Include patient/family/caregiver in decisions related to nutrition  Outcome: Not Progressing   Isolative to her bed in her room, out for meds and meals, did not attend groups  Remains delusional and paranoid  Now expressed concerns r/t seeing staff members wearing gowns and face shields  "Why are they wearing that stuff? Is there something that we need to know?" Reassurance provided and reminded patient to continue to follow rules of wearing mask and social distancing and she would be safe  Ate poorly only eating 25% of breakfast and then refused lunch  No other behaviors or issues noted  Med compliant  Continue to monitor

## 2020-05-04 NOTE — PLAN OF CARE
Problem: Alteration in Thoughts and Perception  Goal: Agree to be compliant with medication regime, as prescribed and report medication side effects  Description  Interventions:  - Offer appropriate PRN medication and supervise ingestion; conduct aims, as needed   Outcome: Progressing     Problem: Alteration in Thoughts and Perception  Goal: Complete daily ADLs, including personal hygiene independently, as able  Description  Interventions:  - Observe, teach, and assist patient with ADLS  - Monitor and promote a balance of rest/activity, with adequate nutrition and elimination   Outcome: Not Progressing     Problem: Depression  Goal: Refrain from isolation  Description  Interventions:  - Develop a trusting relationship   - Encourage socialization   Outcome: Not Progressing  Goal: Refrain from self-neglect  Outcome: Not Progressing     Problem: Nutrition/Hydration-ADULT  Goal: Nutrient/Hydration intake appropriate for improving, restoring or maintaining nutritional needs  Description  Monitor and assess patient's nutrition/hydration status for malnutrition  Collaborate with interdisciplinary team and initiate plan and interventions as ordered  Monitor patient's weight and dietary intake as ordered or per policy  Utilize nutrition screening tool and intervene as necessary  Determine patient's food preferences and provide high-protein, high-caloric foods as appropriate       INTERVENTIONS:  - Monitor oral intake, urinary output, labs, and treatment plans  - Assess nutrition and hydration status and recommend course of action  - Evaluate amount of meals eaten  - Assist patient with eating if necessary   - Allow adequate time for meals  - Recommend/ encourage appropriate diets, oral nutritional supplements, and vitamin/mineral supplements  - Order, calculate, and assess calorie counts as needed  - Recommend, monitor, and adjust tube feedings and TPN/PPN based on assessed needs  - Assess need for intravenous fluids  - Provide specific nutrition/hydration education as appropriate  - Include patient/family/caregiver in decisions related to nutrition  Outcome: Not Josh Truj[place] continues to isolate in room in bed asleep  She came out when prompted for meal & supper medicine  She did not eat meal; had only the Ensure  She is on day 8 without a shower, but, no move to address hygiene  She presents as wan, listless, low energy & motivation  She is pleasant upon approach, but, expresses wariness of select staff & peers  Did not take opportunity for optional Fresh Air group, makes no move to come up for water @ kitchen when it is announced hourly

## 2020-05-04 NOTE — TREATMENT TEAM
05/04/20 0900   Activity/Group Checklist   Group Community meeting   Attendance Did not attend   Attendance Duration (min) 16-30   Affect/Mood IRMA

## 2020-05-04 NOTE — PROGRESS NOTES
Staci Baum has been asleep since the beginning of the shift  Respirations easy and non labored on humidified oxygen 1 liter via nasal cannula  No issues or behaviors  Compliant with medication  Continue to monitor  Precautions maintained

## 2020-05-04 NOTE — TREATMENT TEAM
05/04/20 1100   Activity/Group Checklist   Group   (IMR/My Plate )   Attendance Did not attend   Attendance Duration (min) 46-60   Affect/Mood IRMA

## 2020-05-04 NOTE — PLAN OF CARE
Problem: Alteration in Thoughts and Perception  Goal: Agree to be compliant with medication regime, as prescribed and report medication side effects  Description  Interventions:  - Offer appropriate PRN medication and supervise ingestion; conduct aims, as needed   Outcome: Progressing     Problem: Risk for Self Injury/Neglect  Goal: Refrain from harming self  Description  Interventions:  - Monitor patient closely, per order  - Develop a trusting relationship  - Supervise medication ingestion, monitor effects and side effects   Outcome: Progressing     Problem: Anxiety  Goal: Anxiety is at manageable level  Description  Interventions:  - Assess and monitor patient's anxiety level  - Monitor for signs and symptoms of anxiety both physical and emotional (heart palpitations, chest pain, shortness of breath, headaches, nausea, feeling jumpy, restlessness, irritable, apprehensive)  - Collaborate with interdisciplinary team and initiate plan and interventions as ordered    - Lena patient to unit/surroundings  - Explain treatment plan  - Encourage participation in care  - Encourage verbalization of concerns/fears  - Identify coping mechanisms  - Assist in developing anxiety-reducing skills  - Administer/offer alternative therapies  - Limit or eliminate stimulants  Outcome: Progressing     Problem: Alteration in Orientation  Goal: Interact with staff daily  Description  Interventions:  - Assess and re-assess patient's level of orientation  - Engage patient in 1 on 1 interactions, daily, for a minimum of 15 minutes   - Establish rapport/trust with patient   Outcome: Progressing     Problem: PAIN - ADULT  Goal: Verbalizes/displays adequate comfort level or baseline comfort level  Description  Interventions:  - Encourage patient to monitor pain and request assistance  - Assess pain using appropriate pain scale  - Administer analgesics based on type and severity of pain and evaluate response  - Implement non-pharmacological measures as appropriate and evaluate response  - Consider cultural and social influences on pain and pain management  - Notify physician/advanced practitioner if interventions unsuccessful or patient reports new pain  Outcome: Progressing     Problem: SAFETY ADULT  Goal: Patient will remain free of falls  Description  INTERVENTIONS:  - Assess patient frequently for physical needs  -  Identify cognitive and physical deficits and behaviors that affect risk of falls  -  [place] fall precautions as indicated by assessment   - Educate patient/family on patient safety including physical limitations  - Instruct patient to call for assistance with activity based on assessment  - Modify environment to reduce risk of injury  - Consider OT/PT consult to assist with strengthening/mobility  Outcome: Progressing     Problem: Alteration in Thoughts and Perception  Goal: Attend and participate in unit activities, including therapeutic, recreational, and educational groups  Description  Interventions:  - Provide therapeutic and educational activities daily, encourage attendance and participation, and document same in the medical record     CERTIFIED PEER SPECIALIST INTERVENTIONS:    Complete peer assessment with patient to assess their needs and identify their goals to complete while in the recovery program as well as once discharged into the community  Patient will complete WRAP Plan, Crisis Plan and 5 Life Domains  Patient will attend 50% of groups offered on the unit  Patient will complete a goal card weekly      Outcome: Not Progressing     Problem: Ineffective Coping  Goal: Participates in unit activities  Description  Interventions:  - Provide therapeutic environment   - Provide required programming   - Redirect inappropriate behaviors   Outcome: Not Progressing     Problem: Risk for Self Injury/Neglect  Goal: Attend and participate in unit activities, including therapeutic, recreational, and educational groups  Description  Interventions:  - Provide therapeutic and educational activities daily, encourage attendance and participation, and document same in the medical record  - Obtain collateral information, encourage visitation and family involvement in care   Outcome: Not Progressing     Problem: Depression  Goal: Refrain from isolation  Description  Interventions:  - Develop a trusting relationship   - Encourage socialization   Outcome: Not Progressing     Problem: Nutrition/Hydration-ADULT  Goal: Nutrient/Hydration intake appropriate for improving, restoring or maintaining nutritional needs  Description  Monitor and assess patient's nutrition/hydration status for malnutrition  Collaborate with interdisciplinary team and initiate plan and interventions as ordered  Monitor patient's weight and dietary intake as ordered or per policy  Utilize nutrition screening tool and intervene as necessary  Determine patient's food preferences and provide high-protein, high-caloric foods as appropriate  INTERVENTIONS:  - Monitor oral intake, urinary output, labs, and treatment plans  - Assess nutrition and hydration status and recommend course of action  - Evaluate amount of meals eaten  - Assist patient with eating if necessary   - Allow adequate time for meals  - Recommend/ encourage appropriate diets, oral nutritional supplements, and vitamin/mineral supplements  - Order, calculate, and assess calorie counts as needed  - Recommend, monitor, and adjust tube feedings and TPN/PPN based on assessed needs  - Assess need for intravenous fluids  - Provide specific nutrition/hydration education as appropriate  - Include patient/family/caregiver in decisions related to nutrition  Outcome: Not Progressing   Isolative to her bed in her room, out for meds and meals, did not attend groups  Ate poorly only eating 25% of breakfast and 10% of lunch  No other behaviors or issues noted  Med compliant  Continue to monitor

## 2020-05-04 NOTE — TREATMENT TEAM
05/04/20 0830   Team Meeting   Meeting Type Daily Rounds   Team Members Present   Team Members Present Physician;Nurse; Other (Discipline and Name)  (CPS)   Physician Team Member Dr Colton Nava Team Member Aba Trinidad RN   Other (Discipline and Name) Sylvia Hairston 14 with poor food intake  Suspicious, feels staff   " is out to get her"  Remains isolative, not participating in programming

## 2020-05-04 NOTE — PROGRESS NOTES
Psychiatry Progress Note [address] 60 y o  female MRN: [id number]  Unit/Bed#: MARCIO ADAIR AUDRA [facility] 903-88 Encounter: [id number]  Code Status: Level 1 - Full Code    PCP: Marsha Wei PA-C    Date of Admission:  7/23/2019 1730   Date of Service:  05/04/20  Patient Active Problem List   Diagnosis    [facility]    Tobacco use disorder, continuous    Compression fracture of L4 lumbar vertebra    Ventral hernia    Acute on chronic respiratory failure with hypoxia ([facility] )    [facility], [facility]    Acquired hypothyroidism    Gastroesophageal reflux disease without esophagitis    Abnormal CT of the chest    Excessive cerumen in left ear canal    Lipoma of right upper extremity    Noncompliant with deep vein thrombosis (DVT) prophylaxis    At risk for aspiration    Ear ache    Tachycardia    Chest pain    Low HDL (under 40)     Diagnosis schizoaffective bipolar    Assessment   Overall Status:  No showers since last week and promises to get one today  Still anxious paranoid, psychosomatic, depressed     Certification Statement to demonstrate a period of stability by attending to ADLs and becoming less psychosomatic in attending more groups Acceptance by patient:  Accepting  Rich Mendez in recovery:  Living at another personal care home   Understanding of medications: Yes    Involved in reintegration process: On hold due to [address] in relationship with psychiatrist:  Trusting sometimes    Medication changes    None today    Non-pharmacological treatments   Continue with individual, group, milieu and occupational therapy using recovery principles and psycho-education about accepting illness and the need for treatment   Encourage to take showers twice a week and cooperate with treatment and adl skills as per her behav   Plan   Another therapeutic pass with sister now that she did well  with the assigned staff escort to the [place] but it is now on hold due to [address] virus   Prepare for the [address] [place] [address] Rd and encouraged to accept  rather than refuse it    Safety   Safety and communication plan established to target dynamic risk factors discussed above  Discharge Plan   · back to a [facility] at [address]   Patient did not take any showers over the weekend and none since a week ago  She is still preoccupied and suspicious and still with same psychosomatic sxs of dying so she hardly attends groups at all  Still not eating all meals at this time  The lump under right ear has now disappeared   Continues to suspect some staff and examines her pills before swallowing them, and still questions if her spirometer or her oxygen concentrator are being tampered with  She still expects staff to help her with showers and do things even though  She is fully capable of doing things on her own  Now accepting that she has to go back to the [address] [place] [address] Rd  She has been refusing some of her meals and not always drinking enough fluids  She also promises to try to attend more groups     Sleep:   Fair  Appetite:   Fair but she picks and chooses her food and skip breakfast this morning  Compliance with medications:  Good  Side effects:   none but claims to feel tired sometimes  Review of systems:  Continues to have psychosomatic symptoms     Mental Status Exam  Appearance:  Found laying on bed but sat up when approached   Looks older than her stated age, poorly  groomed and not very well kept, with uncombed hairt but  wearing clean clothing   Anxious preoccupied  Has good eye contact friendly and pleasant today    Behavior:  Superficially friendly pleasant but anxious suspicious  Speech:  tangential at times with tendency to become stilted   Mood:   anxious irritated times,    Affect: increased in intensity range mood congruent redirectable  Thought Process:  Circumstantial with tendency to have stilted speech   Thought Content:  Continues to have some paranoia about motives of staff switching her medications or tampering with her inhalant or her oxygen concentrator off and on  She also has to examine her pills literally before taking them  No preoccupation with violence or suicide  No current suicidal homicidal thoughts intent or plans reported  No phobias but has obsessions and compulsions about examining her pills before she takes them  [place] Psychosomatic about dying by choking from food and from heart attack or from shortness of breath and now from catching Covid-19  Perceptual Disturbances:  None reported   Risk Potential:  Ability to care for herself and refusal to take showers  Sensorium: fully oriented to place, person, time, date, day, month, year and to situation  Cognition:  Grossly intact, aware of current events like the corona virus situation, recent and remote memory intact    No language deficit  Consciousness:  Alert and awake  Attention:  fair  Intellect:  average  Insight:  limited  Judgment:  fair  Motor Activity: No abnormal involuntary movement noted today    Vitals  Temp:  [97 4 °F (36 3 °C)-98 °F (36 7 °C)] 97 4 °F (36 3 °C)  HR:  [77-89] 77  Resp:  [14-18] 18  BP: ()/(56-61) 118/61  SpO2:  [92 %-93 %] 92 %    Intake/Output Summary (Last 24 hours) at 5/4/2020 0925  Last data filed at 5/3/2020 1300  Gross per 24 hour   Intake    Output 0 ml   Net 0 ml       Lab Results: No New Labs Available For Today          Current Facility-Administered Medications:  acetaminophen 325 mg Oral Q6H PRN Teri Huggins MD   acetaminophen 650 mg Oral Q6H PRN Teri Huggins MD   acetaminophen 650 mg Oral Q8H PRN Teri Huggins MD   albuterol 2 puff Inhalation Q4H PRN Teri Huggins MD   aluminum-magnesium hydroxide-simethicone 15 mL Oral Q4H PRN Teri Huggins MD   ammonium lactate 1 application Topical BID PRN Teri Huggins MD   benzonatate 100 mg Oral TID PRN Teri Huggins MD   benztropine 1 mg Intramuscular Q8H PRN Teri Huggins MD   carbamide peroxide 5 drop Left Ear BID PRN Deedee Muir MD   cloZAPine 25 mg Oral BID Deedee Muir MD   cloZAPine 50 mg Oral BID Deedee Muir MD   docusate sodium 100 mg Oral BID PRN Deedee Muir MD   EPINEPHrine PF 0 15 mg Intramuscular Once PRN Deedee Muir MD   fluticasone-vilanterol 1 puff Inhalation Daily Deedee Muir MD   ketotifen 1 drop Right Eye BID PRN Deedee Muir MD   levothyroxine 125 mcg Oral Early Morning Deedee Muir MD   magnesium hydroxide 30 mL Oral Daily PRN Deedee Muir MD   montelukast 10 mg Oral HS Deedee Muir MD   OLANZapine 5 mg Intramuscular Q8H PRN Deedee Muir MD   OLANZapine 5 mg Oral Q8H PRBJORN Muir MD   ondansetron 4 mg Oral Q6H PRN Deedee Muir MD   pantoprazole 40 mg Oral Early Morning Deedee Muir MD   polyethylene glycol 17 g Oral Daily PRN Deedee Muir MD   polyvinyl alcohol 1 drop Both Eyes Q3H PRN Deedee Muir MD   sertraline 175 mg Oral Daily Deedee Muir MD   sucralfate 1,000 mg Oral BID Deedee Muir MD   theophylline 200 mg Oral Daily Deedee Muir MD   tiotropium 18 mcg Inhalation Daily Deedee Muir MD   traZODone 25 mg Oral HS PRN Deedee Muir MD       Counseling / Coordination of Care: Total floor / unit time spent today 15 minutes  Greater than 50% of total time was spent with the patient and / or family counseling and / or somewhat receptive to supportive listening and teaching positive coping skills to deal with symptom mangement  Patient's Rights, confidentiality and exceptions to confidentiality, use of automated medical record, Jeb Patrick staff access to medical record, and consent to treatment reviewed

## 2020-05-05 NOTE — PROGRESS NOTES
2[address] Maggie did not attend PM Group  Needed prompts awake to perform her Incentive Spirometer (achieved consistent 1250ml volume), to come out for HS snack & HS medicine (all but Singulair taken)  Expects staff to guess/notice when she is ready for things (her medicine, the QHS O2; "I thought you'd notice my light is still on ")  Presented need for her to be assertive when has a need, &/or is ready for elements of her nightly routine; let staff know  Wearing now her QHS nasal O2 @ 1L for bed

## 2020-05-05 NOTE — PLAN OF CARE
Problem: Alteration in Thoughts and Perception  Goal: Verbalize thoughts and feelings  Description  Interventions:  - Promote a nonjudgmental and trusting relationship with the patient through active listening and therapeutic communication  - Assess patient's level of functioning, behavior and potential for risk  - Engage patient in 1 on 1 interactions for a minimum of 15 minutes each session  - Encourage patient to express fears, feelings, frustrations, and discuss symptoms    - Golden patient to reality, help patient recognize reality-based thinking   - Administer medications as ordered and assess for potential side effects  - Provide the patient education related to the signs and symptoms of the illness and desired effects of prescribed medications  Outcome: Progressing  Goal: Agree to be compliant with medication regime, as prescribed and report medication side effects  Description  Interventions:  - Offer appropriate PRN medication and supervise ingestion; conduct aims, as needed   Outcome: Progressing  Goal: Complete daily ADLs, including personal hygiene independently, as able  Description  Interventions:  - Observe, teach, and assist patient with ADLS  - Monitor and promote a balance of rest/activity, with adequate nutrition and elimination   Outcome: Progressing     Problem: Alteration in Thoughts and Perception  Goal: Attend and participate in unit activities, including therapeutic, recreational, and educational groups  Description  Interventions:  - Provide therapeutic and educational activities daily, encourage attendance and participation, and document same in the medical record     CERTIFIED PEER SPECIALIST INTERVENTIONS:    Complete peer assessment with patient to assess their needs and identify their goals to complete while in the recovery program as well as once discharged into the community  Patient will complete WRAP Plan, Crisis Plan and 5 Life Domains      Patient will attend 50% of groups offered on the unit  Patient will complete a goal card weekly  Outcome: Not Progressing     Problem: Depression  Goal: Refrain from isolation  Description  Interventions:  - Develop a trusting relationship   - Encourage socialization   Outcome: Not Progressing     Problem: Nutrition/Hydration-ADULT  Goal: Nutrient/Hydration intake appropriate for improving, restoring or maintaining nutritional needs  Description  Monitor and assess patient's nutrition/hydration status for malnutrition  Collaborate with interdisciplinary team and initiate plan and interventions as ordered  Monitor patient's weight and dietary intake as ordered or per policy  Utilize nutrition screening tool and intervene as necessary  Determine patient's food preferences and provide high-protein, high-caloric foods as appropriate  INTERVENTIONS:  - Monitor oral intake, urinary output, labs, and treatment plans  - Assess nutrition and hydration status and recommend course of action  - Evaluate amount of meals eaten  - Assist patient with eating if necessary   - Allow adequate time for meals  - Recommend/ encourage appropriate diets, oral nutritional supplements, and vitamin/mineral supplements  - Order, calculate, and assess calorie counts as needed  - Recommend, monitor, and adjust tube feedings and TPN/PPN based on assessed needs  - Assess need for intravenous fluids  - Provide specific nutrition/hydration education as appropriate  - Include patient/family/caregiver in decisions related to nutrition  Outcome: Not Deiv Castro was willing to shower, but, initially attempting to forego shampoo  This nurse made her aware this was not optional as hadn't bathed or shampooed in 9 days  Responded, "Can I have someone stay w/me in case I get weak & fall all over the place?" Was set up by T for shower, assisted to comb out hair   She performed the bathing & shampooing herself, w/assistance only to comb out, blow dry, re-braid hair  Did not @ any time become weak or fall all over as anticipated  Did come out for supper medicine  Refused meal; drank only Ensure  Since conclusion of meal, asleep in bed  Did not respond to invitations to optional [employer] group or PM Group

## 2020-05-05 NOTE — TREATMENT TEAM
05/05/20 1400   Activity/Group Checklist   Group   (Recovery Workshop )   Attendance Did not attend   Attendance Duration (min) 46-60   Affect/Mood IRMA

## 2020-05-05 NOTE — PROGRESS NOTES
05/05/20 0930   Team Meeting   Meeting Type Tx Team Meeting   Initial Conference Date 05/05/20   Next Conference Date 05/12/20   Team Members Present   Team Members Present Physician;Nurse; Other (Discipline and Name)   Physician Team Member Dr Corinne Folds, RN   Other (Discipline and Name) Libertad Ferrer, JONATHON; Carin Powers, [place] and Debi Stewart, [facility] REYES   Patient/Family Present   Patient Present Yes   Patient's Family Present No     Patient attended treatment team meeting this morning without self-assessment  Patient's group attendance for last week was not calculated at time of meeting  Patient was asked to keep a distance from treatment team members, as she has not showered In many days  Patient continues to complain of respiratory issues which have been closely monitored since her admission in July 2019  Patient currently slotted for [facility] and encouraged to show good participation in her recovery  Team and patient completed risk assessment and the patient did not verbalize any desire to elope from the program   Patient verbalized understanding of consequences of eloping from treatment while on a commitment  Patient verbalized no further questions or concerns at the conclusion of the meeting  Next team meeting scheduled for 5/12

## 2020-05-05 NOTE — PLAN OF CARE
Problem: Alteration in Thoughts and Perception  Goal: Attend and participate in unit activities, including therapeutic, recreational, and educational groups  Description  Interventions:  - Provide therapeutic and educational activities daily, encourage attendance and participation, and document same in the medical record     CERTIFIED PEER SPECIALIST INTERVENTIONS:    Complete peer assessment with patient to assess their needs and identify their goals to complete while in the recovery program as well as once discharged into the community  Patient will complete WRAP Plan, Crisis Plan and 5 Life Domains  Patient will attend 50% of groups offered on the unit  Patient will complete a goal card weekly  Outcome: Not Progressing    Individual struggles to participate in her recovery  She spends most of the isolative in her room, not participating int he milieu  She attended treatment team, but she was dramatic, not willing to discuss issues  She was very somatic, complained of being " sick", week, and second guesses staff's intentions  She did not appear to be in distress  Complaint with meds  Appetite 25% rbreakfast, 10% of lunch  Availability of staff made known  Will continue to monitor

## 2020-05-05 NOTE — PLAN OF CARE
Problem: Alteration in Thoughts and Perception  Goal: Attend and participate in unit activities, including therapeutic, recreational, and educational groups  Description  Interventions:  - Provide therapeutic and educational activities daily, encourage attendance and participation, and document same in the medical record     CERTIFIED PEER SPECIALIST INTERVENTIONS:    Complete peer assessment with patient to assess their needs and identify their goals to complete while in the recovery program as well as once discharged into the community  Patient will complete WRAP Plan, Crisis Plan and 5 Life Domains  Patient will attend 50% of groups offered on the unit  Patient will complete a goal card weekly  Outcome: Not Progressing     Problem: Ineffective Coping  Goal: Demonstrates healthy coping skills  Outcome: Not Progressing  Patient not attending groups on a consistent basis however, did begin her WRAP Plan with this writers assistance  Patient not taking care of hygiene and remains in bed most of the day  Patient is continuously prompted and encouraged to attend more groups throughout the week

## 2020-05-05 NOTE — PROGRESS NOTES
05/05/20 0900   Team Meeting   Meeting Type Daily Rounds   Team Members Present   Team Members Present Physician;Nurse; Other (Discipline and Name)   Physician Team Member Dr Helena Navarro Team Member Harshil Flores RN   Other (Discipline and Name) Yair Gonsalves LCSW; SHANIKA Millan     Patient with poor food intake  Continues to express somatic complaints

## 2020-05-05 NOTE — TREATMENT TEAM
Entitled/Undermining      05/05/20 1100   Activity/Group Checklist   Group   ([facility]ness )   Attendance Attended   Attendance Duration (min) Greater than 60   Interactions Interacted appropriately   Affect/Mood Appropriate;Normal range   Goals Achieved Able to listen to others; Able to engage in interactions

## 2020-05-05 NOTE — NURSING NOTE
On safety precautions  Slept through the night  On O2 at 1 liter via nasal cannula  No distress noted, offers no complaints  Med compliant this AM  Stated she slept well  Monitored on continuous Q 7 safety checks

## 2020-05-05 NOTE — PROGRESS NOTES
Psychiatry Progress Note [address] 60 y o  female MRN: [id number]  Unit/Bed#: [facility] [place] [id number] Encounter: [id number]  Code Status: Level 1 - Full Code    PCP: Praveen Barrios PA-C    Date of Admission:  7/23/2019 1730   Date of Service:  05/05/20  Patient Active Problem List   Diagnosis    COPD with asthma ([address] )    Tobacco use disorder, continuous    Compression fracture of L4 lumbar vertebra    Ventral hernia    Acute on chronic respiratory failure with hypoxia ([address] )    Schizoaffective disorder, bipolar type ([address] )    Acquired hypothyroidism    Gastroesophageal reflux disease without esophagitis    Abnormal CT of the chest    Excessive cerumen in left ear canal    Lipoma of right upper extremity    Noncompliant with deep vein thrombosis (DVT) prophylaxis    At risk for aspiration    Ear ache    Tachycardia    Chest pain    Low HDL (under 40)     Diagnosis schizoaffective bipolar    Assessment   Overall Status:  Again passive-aggressive not taking showers for more than a week and not attending groups expecting people to cater to her by bringing medications to her    Certification Statement to demonstrate a period of stability by attending to ADLs and becoming less psychosomatic in attending more groups Acceptance by patient:  Accepting  Radha Vincent in recovery:  Living at another personal care home   Understanding of medications: Yes    Involved in reintegration process: On hold due to [address] in relationship with psychiatrist:  Trusting sometimes    Medication changes    None today    Non-pharmacological treatments   Continue with individual, group, milieu and occupational therapy using recovery principles and psycho-education about accepting illness and the need for treatment   Encourage to take showers twice a week and cooperate with treatment and adl skills as per her behav   Plan   Another therapeutic pass with sister now that she did well  with the assigned staff escort to the park but it is now on hold due to Harshil Flores Prepare for the [facility] and encouraged to accept  rather than refuse it    Safety   Safety and communication plan established to target dynamic risk factors discussed above  Discharge Plan   · back to a [facility] at [address]   Did attend team today but appeared dishevelled, poorly groomed and unkept claiming to feel "sick" despite normal VS and no visible sxs suggestive of pnumonia or heart attack as she claims she has  Despite promising to take a shower yesterday, she did not and has been was have aggressive expecting staff to come to her bed and give her medications even though she has affected capable of getting up and do things on her own  She continues to complain about feeling weak tired anxious and remains suspicious and psychosomatic claiming she is dying from shortness of breath, heart problems, choking on food etc and is picking on her food  She is no longer worried about the lump under her right ear which has not disappeared  She is still accusing some of the staff and examines her medications before she swallows them  She knows that she has no other place other than the [facility] where she has been already accepted and that she needs to improve her group attendance and hygiene before we can proceed with that  Patient remains suspicious preoccupied anxious but is receptive to verbal reassurance and support  Sleep:   Fair  Appetite:   Fair but she picks and chooses her food and skip breakfast this morning  Compliance with medications:  Good  Side effects:   none but claims to feel tired sometimes  Review of systems:  Continues to have psychosomatic symptoms     Mental Status Exam  Appearance:  Attended team with mask on   Looks older than her stated age, poorly  groomed and not very well kept, with uncombed hairt but  wearing clean clothing   Anxious preoccupied    Has good eye contact friendly and pleasant today  Behavior:  Superficially friendly pleasant but anxious suspicious  Speech:  tangential at times with tendency to become stilted   Mood:   anxious irritated times,    Affect: increased in intensity range mood congruent redirectable  Thought Process:  Circumstantial with tendency to have stilted speech   Thought Content:  Continues to have some paranoia about motives of staff switching her medications or tampering with her inhalant or her oxygen concentrator off and on  She also has to examine her pills literally before taking them  No preoccupation with violence or suicide  No current suicidal homicidal thoughts intent or plans reported  No phobias but has obsessions and compulsions about examining her pills before she takes them  Iman Jasso Psychosomatic about dying by choking from food and from heart attack or from shortness of breath and now from catching Covid-19  Perceptual Disturbances:  None reported   Risk Potential:  Ability to care for herself and refusal to take showers  Sensorium: fully oriented to place, person, time, date, day, month, year and to situation  Cognition:  Grossly intact, aware of current events like the corona virus situation, recent and remote memory intact    No language deficit  Consciousness:  Alert and awake  Attention:  fair  Intellect:  average  Insight:  limited  Judgment:  fair  Motor Activity: No abnormal involuntary movement noted today    Vitals  Temp:  [97 8 °F (36 6 °C)] 97 8 °F (36 6 °C)  HR:  [67] 67  Resp:  [20] 20  BP: (90)/(58) 90/58  SpO2:  [93 %-95 %] 95 %  No intake or output data in the 24 hours ending 05/05/20 0759    Lab Results: No New Labs Available For Today          Current Facility-Administered Medications:  acetaminophen 325 mg Oral Q6H PRN Roberto Shankar MD   acetaminophen 650 mg Oral Q6H PRN Roberto Shankar MD   acetaminophen 650 mg Oral Q8H PRN Roberto Shankar MD   albuterol 2 puff Inhalation Q4H PRN Roberto Shankar MD aluminum-magnesium hydroxide-simethicone 15 mL Oral Q4H PRN Sandhya Stain, MD   ammonium lactate 1 application Topical BID PRN Sandhya Bolaños, MD   benzonatate 100 mg Oral TID PRN Sandhya Bolaños, MD   benztropine 1 mg Intramuscular Q8H PRN Sandhya Stain, MD   carbamide peroxide 5 drop Left Ear BID PRN Sandhya Stain, MD   cloZAPine 25 mg Oral BID Sandhya Stain, MD   cloZAPine 50 mg Oral BID Sandhya Stain, MD   docusate sodium 100 mg Oral BID PRN Sandhya Stain, MD   EPINEPHrine PF 0 15 mg Intramuscular Once PRN Sandhya Bolaños, MD   fluticasone-vilanterol 1 puff Inhalation Daily Sandhya Bolaños, MD   ketotifen 1 drop Right Eye BID PRN Sandhya Bolaños, MD   levothyroxine 125 mcg Oral Early Morning Sandhya Bolaños, MD   magnesium hydroxide 30 mL Oral Daily PRN Sandhya Bolaños, MD   montelukast 10 mg Oral HS Sandhya Bolaños, MD   OLANZapine 5 mg Intramuscular Q8H PRN Sandhya Bolaños, MD   OLANZapine 5 mg Oral Q8H PRN Sandhya Bolaños, MD   ondansetron 4 mg Oral Q6H PRN Sandhya Bolaños, MD   pantoprazole 40 mg Oral Early Morning Sandhya Bolaños, MD   polyethylene glycol 17 g Oral Daily PRN Sandhya Bolaños MD   polyvinyl alcohol 1 drop Both Eyes Q3H PRN Sandhya Bolaños, MD   sertraline 175 mg Oral Daily Sandhya Bolaños, MD   sucralfate 1,000 mg Oral BID Sandhya Bolaños, MD   theophylline 200 mg Oral Daily Sanhdya Bolaños MD   tiotropium 18 mcg Inhalation Daily Sandhya Bolaños MD   traZODone 25 mg Oral HS PRN Sandhya Bolaños MD       Counseling / Coordination of Care: Total floor / unit time spent today 15 minutes  Greater than 50% of total time was spent with the patient and / or family counseling and / or somewhat receptive to supportive listening and teaching positive coping skills to deal with symptom mangement  Patient's Rights, confidentiality and exceptions to confidentiality, use of automated medical record, Jeb Patrick staff access to medical record, and consent to treatment reviewed

## 2020-05-05 NOTE — TREATMENT TEAM
05/05/20 0900   Activity/Group Checklist   Group Community meeting   Attendance Did not attend   Attendance Duration (min) 16-30   Affect/Mood IRMA

## 2020-05-06 NOTE — TREATMENT TEAM
05/06/20 0900   Activity/Group Checklist   Group Community meeting  (Guided Meditation )   Attendance Did not attend   Attendance Duration (min) 16-30   Affect/Mood IRMA

## 2020-05-06 NOTE — PROGRESS NOTES
[address]  did perform her Incentive Spirometry achieving consistent 1250ml volume  She did come out for HS snack & to take HS medicine (except the Singulair)  Was put out that a peer was occupying phone chair in [place] for prolonged period while using phone which prevented her from sitting in it  Did start to laugh when this nurse pointed out to her this would be an issue if she were waiting to use the phone (which she isn't)  Wearing now her QHS humidified nasal O2 @ 1L for bed

## 2020-05-06 NOTE — PROGRESS NOTES
Maggie maintained on ongoing fall and SAFE precaution  Miriam Curl in bed with eyes closed, breath even and unlabored   On O2 with humidifier @1L/m via nasal cannula  Continues rounding implemented   No somatic complaint overnight  No PRN needed for sleep aid   No indication of pain or discomfort   In no distress   Will continue to monitor

## 2020-05-06 NOTE — PROGRESS NOTES
05/06/20 3749   Team Meeting   Meeting Type Daily Rounds   Team Members Present   Team Members Present Physician;Nurse;; Other (Discipline and Name)   Physician Team Member Dr Josh Mayfield Team Member Idalia Rosenberg RN   Care Management Team Member Michelle Miller MSW   Other (Discipline and Name) Maria De Jesus Higginbotham LCSW     [facility]  Showered  Somatic

## 2020-05-06 NOTE — PLAN OF CARE
Problem: Alteration in Thoughts and Perception  Goal: Verbalize thoughts and feelings  Description  Interventions:  - Promote a nonjudgmental and trusting relationship with the patient through active listening and therapeutic communication  - Assess patient's level of functioning, behavior and potential for risk  - Engage patient in 1 on 1 interactions for a minimum of 15 minutes each session  - Encourage patient to express fears, feelings, frustrations, and discuss symptoms    - Potter patient to reality, help patient recognize reality-based thinking   - Administer medications as ordered and assess for potential side effects  - Provide the patient education related to the signs and symptoms of the illness and desired effects of prescribed medications  Outcome: Progressing  Goal: Agree to be compliant with medication regime, as prescribed and report medication side effects  Description  Interventions:  - Offer appropriate PRN medication and supervise ingestion; conduct aims, as needed   Outcome: Progressing     Problem: Alteration in Thoughts and Perception  Goal: Attend and participate in unit activities, including therapeutic, recreational, and educational groups  Description  Interventions:  - Provide therapeutic and educational activities daily, encourage attendance and participation, and document same in the medical record     CERTIFIED PEER SPECIALIST INTERVENTIONS:    Complete peer assessment with patient to assess their needs and identify their goals to complete while in the recovery program as well as once discharged into the community  Patient will complete WRAP Plan, Crisis Plan and [facility] [employer]  Patient will attend 50% of groups offered on the unit  Patient will complete a goal card weekly      Outcome: Not Progressing     Problem: Depression  Goal: Refrain from isolation  Description  Interventions:  - Develop a trusting relationship   - Encourage socialization   Outcome: Not Progressing  Goal: Refrain from self-neglect  Outcome: Not Progressing     Problem: Nutrition/Hydration-ADULT  Goal: Nutrient/Hydration intake appropriate for improving, restoring or maintaining nutritional needs  Description  Monitor and assess patient's nutrition/hydration status for malnutrition  Collaborate with interdisciplinary team and initiate plan and interventions as ordered  Monitor patient's weight and dietary intake as ordered or per policy  Utilize nutrition screening tool and intervene as necessary  Determine patient's food preferences and provide high-protein, high-caloric foods as appropriate  INTERVENTIONS:  - Monitor oral intake, urinary output, labs, and treatment plans  - Assess nutrition and hydration status and recommend course of action  - Evaluate amount of meals eaten  - Assist patient with eating if necessary   - Allow adequate time for meals  - Recommend/ encourage appropriate diets, oral nutritional supplements, and vitamin/mineral supplements  - Order, calculate, and assess calorie counts as needed  - Recommend, monitor, and adjust tube feedings and TPN/PPN based on assessed needs  - Assess need for intravenous fluids  - Provide specific nutrition/hydration education as appropriate  - Include patient/family/caregiver in decisions related to nutrition  Outcome: Jannette Samuel Carmen continues to isolate in room in bed, either asleep or sitting crossed legged on bed peering out door  She is disheveled, unkempt despite showering as recently as yesterday  Did come out for supper medicine  Again rejected her meal saying she didn't like what was served; had only the Ensure  Continues somatic & minimally willing to help herself as feels her perceived issues prevent her functioning  Did not respond to invitation to optional [employer] group

## 2020-05-06 NOTE — PROGRESS NOTES
05/06/20 1000   Activity/Group Checklist   Group Other (Comment)  (Journaling/Electronics - Graduation)   Attendance Did not attend     Patient declined to attend peer's graduation  She was personally prompted and encouraged  In bed when prompted

## 2020-05-06 NOTE — PLAN OF CARE
Problem: Ineffective Coping  Goal: Participates in unit activities  Description  Interventions:  - Provide therapeutic environment   - Provide required programming   - Redirect inappropriate behaviors   Outcome: Not Progressing   Writer attempted to meet with Patient and complete WRAP Plan however, Patient was in bed  Writer will attempt on another day and time

## 2020-05-06 NOTE — PROGRESS NOTES
Psychiatry Progress Note [address] 60 y o  female MRN: [id number]  Unit/Bed#: [facility] ADA[facility] [place] [address] Encounter: [id number]  Code Status: Level 1 - Full Code    PCP: Aspen Metzger PA-C    Date of Admission:  7/23/2019 1730   Date of Service:  05/06/20  Patient Active Problem List   Diagnosis    COPD with asthma ([facility] )    Tobacco use disorder, continuous    Compression fracture of L4 lumbar vertebra    Ventral hernia    Acute on chronic respiratory failure with hypoxia ([facility] )    Schizoaffective disorder, bipolar type ([facility] )    Acquired hypothyroidism    Gastroesophageal reflux disease without esophagitis    Abnormal CT of the chest    Excessive cerumen in left ear canal    Lipoma of right upper extremity    Noncompliant with deep vein thrombosis (DVT) prophylaxis    At risk for aspiration    Ear ache    Tachycardia    Chest pain    Low HDL (under 40)     Diagnosis schizoaffective bipolar type  Assessment   Overall Status:  Finally took a shower after a week but still psychosomatic paranoid suspicious preoccupied complaining of dying from physical illnesses but responsive to support and reassurance   Certification Statement to demonstrate a period of stability by attending to ADLs and becoming less psychosomatic in attending more groups Acceptance by patient:  Accepting  Meldon Leisure in recovery:  Living at another personal care home   Understanding of medications: Yes    Involved in reintegration process: On hold due to [address] in relationship with psychiatrist:  Trusting sometimes    Medication changes    None today    Non-pharmacological treatments   Continue with individual, group, milieu and occupational therapy using recovery principles and psycho-education about accepting illness and the need for treatment   Encourage to take showers twice a week and cooperate with treatment and adl skills as per her behav   Plan   Another therapeutic pass with sister now that she did well  with the assigned staff escort to the park but it is now on hold due to [address] virus   Prepare for the [address] [place] [address] Rd and encouraged to accept  rather than refuse it    Safety   Safety and communication plan established to target dynamic risk factors discussed above  Discharge Plan   · back to a [facility] at [address]   Patient did finally take a shower yesterday with staff assistance but was insisting that she needed lot of assistance when though she is perfectly capable of taking showers on her own  She continues to be attention seeking with psychosomatic symptoms of dying from multiple illnesses despite frequent reassurances by staff  She still is suspicious about her medications so she examines them before she swallows them and is still suspicious preoccupied anxious but usually receptive to verbal reassurance and support every time I talk to her in the morning  She was found sitting up or on her bed in her room this morning and was friendly and pleasant upon approach but anxious  She tells me she will try hard to go to groups so she can go back to the personal care home at the [address] were she was already accepted    Sleep:   Fair  Appetite:   Fair but she picks and chooses her food   Compliance with medications:  Good  Side effects:   none but claims to feel tired sometimes  Review of systems:  Continues to have psychosomatic symptoms     Mental Status Exam  Appearance:  Attended team with mask on   Looks older than her stated age, poorly  groomed and not very well kept, with uncombed hairt but  wearing clean clothing   Anxious preoccupied  Has good eye contact friendly and pleasant today    Behavior:  Superficially friendly pleasant but anxious suspicious  Speech:  tangential at times with tendency to become stilted   Mood:   anxious irritated times,    Affect: increased in intensity range mood congruent redirectable  Thought Process:  Circumstantial with tendency to have stilted speech   Thought Content:  Continues to have some paranoia about motives of staff switching her medications or tampering with her inhalant or her oxygen concentrator off and on  She also has to examine her pills literally before taking them  No preoccupation with violence or suicide  No current suicidal homicidal thoughts intent or plans reported  No phobias but has obsessions and compulsions about examining her pills before she takes them  Glory Roman Psychosomatic about dying by choking from food and from heart attack or from shortness of breath and now from catching Covid-19  Perceptual Disturbances:  None reported   Risk Potential:  Ability to care for herself and refusal to take showers  Sensorium: fully oriented to place, person, time, date, day, month, year and to situation  Cognition:  Grossly intact, aware of current events like the corona virus situation, recent and remote memory intact    No language deficit  Consciousness:  Alert and awake  Attention:  fair  Intellect:  average  Insight:  limited  Judgment:  fair  Motor Activity: No abnormal involuntary movement noted today    Vitals  Temp:  [97 °F (36 1 °C)-97 9 °F (36 6 °C)] 97 9 °F (36 6 °C)  HR:  [75-88] 78  Resp:  [16-18] 18  BP: (105-124)/(59-63) 108/60  SpO2:  [93 %-96 %] 96 %  No intake or output data in the 24 hours ending 05/06/20 1016    Lab Results: No New Labs Available For Today          Current Facility-Administered Medications:  acetaminophen 325 mg Oral Q6H PRN Jeet Levine MD   acetaminophen 650 mg Oral Q6H PRN Jeet Levine MD   acetaminophen 650 mg Oral Q8H PRN Jeet Levine MD   albuterol 2 puff Inhalation Q4H PRN Jeet Levine MD   aluminum-magnesium hydroxide-simethicone 15 mL Oral Q4H PRN Jeet Levine MD   ammonium lactate 1 application Topical BID PRN Jeet Levine MD   benzonatate 100 mg Oral TID PRN Jeet Levine MD   benztropine 1 mg Intramuscular Q8H PRN Jeet Levine MD   carbamide peroxide 5 drop Left Ear BID PRN Jill Gayle MD   cloZAPine 25 mg Oral BID Jill Gayle MD   cloZAPine 50 mg Oral BID Jill Gayle MD   docusate sodium 100 mg Oral BID PRN Jill Gayle MD   EPINEPHrine PF 0 15 mg Intramuscular Once PRN Jill Gayle MD   fluticasone-vilanterol 1 puff Inhalation Daily Jill Gayle MD   ketotifen 1 drop Right Eye BID PRN Jill Gayle MD   levothyroxine 125 mcg Oral Early Morning Jill Gayle MD   magnesium hydroxide 30 mL Oral Daily PRN Jill Gayle MD   montelukast 10 mg Oral HS Jill Gayle MD   OLANZapine 5 mg Intramuscular Q8H PRN Jill Gayle MD   OLANZapine 5 mg Oral Q8H PRN Jill Gayle MD   ondansetron 4 mg Oral Q6H PRN Jill Gayle MD   pantoprazole 40 mg Oral Early Morning Jill Gayle MD   polyethylene glycol 17 g Oral Daily PRN Jill Gayle MD   polyvinyl alcohol 1 drop Both Eyes Q3H PRN Jill Gayle MD   sertraline 175 mg Oral Daily Jill Gayle MD   sucralfate 1,000 mg Oral BID Jill Gayle MD   theophylline 200 mg Oral Daily Jill Gayle MD   tiotropium 18 mcg Inhalation Daily Jill Gayle MD   traZODone 25 mg Oral HS PRN Jill Gayle MD       Counseling / Coordination of Care: Total floor / unit time spent today 15 minutes  Greater than 50% of total time was spent with the patient and / or family counseling and / or somewhat receptive to supportive listening and teaching positive coping skills to deal with symptom mangement  Patient's Rights, confidentiality and exceptions to confidentiality, use of automated medical record, [facility] staff access to medical record, and consent to treatment reviewed

## 2020-05-06 NOTE — TREATMENT TEAM
05/06/20 1100   Activity/Group Checklist   Group   (IMR/Recovery Anonymous)   Attendance Did not attend   Attendance Duration (min) 46-60   Affect/Mood IRMA

## 2020-05-06 NOTE — PLAN OF CARE
Problem: Alteration in Thoughts and Perception  Goal: Verbalize thoughts and feelings  Description  Interventions:  - Promote a nonjudgmental and trusting relationship with the patient through active listening and therapeutic communication  - Assess patient's level of functioning, behavior and potential for risk  - Engage patient in 1 on 1 interactions for a minimum of 15 minutes each session  - Encourage patient to express fears, feelings, frustrations, and discuss symptoms    - [place] patient to reality, help patient recognize reality-based thinking   - Administer medications as ordered and assess for potential side effects  - Provide the patient education related to the signs and symptoms of the illness and desired effects of prescribed medications  Outcome: Progressing  Goal: Agree to be compliant with medication regime, as prescribed and report medication side effects  Description  Interventions:  - Offer appropriate PRN medication and supervise ingestion; conduct aims, as needed   Outcome: Progressing     Problem: Ineffective Coping  Goal: Patient/Family verbalizes awareness of resources  Outcome: Progressing  Goal: Understands least restrictive measures  Description  Interventions:  - Utilize least restrictive behavior  Outcome: Progressing     Problem: Risk for Self Injury/Neglect  Goal: Treatment Goal: Remain safe during length of stay, learn and adopt new coping skills, and be free of self-injurious ideation, impulses and acts at the time of discharge  Outcome: Progressing  Goal: Verbalize thoughts and feelings  Description  Interventions:  - Assess and re-assess patient's lethality and potential for self-injury  - Engage patient in 1:1 interactions, daily, for a minimum of 15 minutes  - Encourage patient to express feelings, fears, frustrations, hopes  - Establish rapport/trust with patient   Outcome: Progressing  Goal: Refrain from harming self  Description  Interventions:  - Monitor patient closely, per order  - Develop a trusting relationship  - Supervise medication ingestion, monitor effects and side effects   Outcome: Progressing     Problem: Depression  Goal: Verbalize thoughts and feelings  Description  Interventions:  - Assess and re-assess patient's level of risk   - Engage patient in 1:1 interactions, daily, for a minimum of 15 minutes   - Encourage patient to express feelings, fears, frustrations, hopes   Outcome: Progressing     Problem: Anxiety  Goal: Anxiety is at manageable level  Description  Interventions:  - Assess and monitor patient's anxiety level  - Monitor for signs and symptoms of anxiety both physical and emotional (heart palpitations, chest pain, shortness of breath, headaches, nausea, feeling jumpy, restlessness, irritable, apprehensive)  - Collaborate with interdisciplinary team and initiate plan and interventions as ordered    - [place] patient to unit/surroundings  - Explain treatment plan  - Encourage participation in care  - Encourage verbalization of concerns/fears  - Identify coping mechanisms  - Assist in developing anxiety-reducing skills  - Administer/offer alternative therapies  - Limit or eliminate stimulants  Outcome: Progressing     Problem: Alteration in Orientation  Goal: Interact with staff daily  Description  Interventions:  - Assess and re-assess patient's level of orientation  - Engage patient in 1 on 1 interactions, daily, for a minimum of 15 minutes   - Establish rapport/trust with patient   Outcome: Progressing     Problem: PAIN - ADULT  Goal: Verbalizes/displays adequate comfort level or baseline comfort level  Description  Interventions:  - Encourage patient to monitor pain and request assistance  - Assess pain using appropriate pain scale  - Administer analgesics based on type and severity of pain and evaluate response  - Implement non-pharmacological measures as appropriate and evaluate response  - Consider cultural and social influences on pain and pain management  - Notify physician/advanced practitioner if interventions unsuccessful or patient reports new pain  Outcome: Progressing     Problem: SAFETY ADULT  Goal: Patient will remain free of falls  Description  INTERVENTIONS:  - Assess patient frequently for physical needs  -  Identify cognitive and physical deficits and behaviors that affect risk of falls  -  [place] fall precautions as indicated by assessment   - Educate patient/family on patient safety including physical limitations  - Instruct patient to call for assistance with activity based on assessment  - Modify environment to reduce risk of injury  - Consider OT/PT consult to assist with strengthening/mobility  Outcome: Progressing     Problem: Alteration in Thoughts and Perception  Goal: Treatment Goal: Gain control of psychotic behaviors/thinking, reduce/eliminate presenting symptoms and demonstrate improved reality functioning upon discharge  Outcome: Not Progressing  Goal: Attend and participate in unit activities, including therapeutic, recreational, and educational groups  Description  Interventions:  - Provide therapeutic and educational activities daily, encourage attendance and participation, and document same in the medical record     CERTIFIED PEER SPECIALIST INTERVENTIONS:    Complete peer assessment with patient to assess their needs and identify their goals to complete while in the recovery program as well as once discharged into the community  Patient will complete WRAP Plan, Crisis Plan and 5 Life Domains  Patient will attend 50% of groups offered on the unit  Patient will complete a goal card weekly      Outcome: Not Progressing  Goal: Recognize dysfunctional thoughts, communicate reality-based thoughts at the time of discharge  Description  Interventions:  - Provide medication and psycho-education to assist patient in compliance and developing insight into his/her illness   Outcome: Not Progressing     Problem: Ineffective Coping  Goal: Demonstrates healthy coping skills  Outcome: Not Progressing  Goal: Participates in unit activities  Description  Interventions:  - Provide therapeutic environment   - Provide required programming   - Redirect inappropriate behaviors   Outcome: Not Progressing     Problem: Risk for Self Injury/Neglect  Goal: Attend and participate in unit activities, including therapeutic, recreational, and educational groups  Description  Interventions:  - Provide therapeutic and educational activities daily, encourage attendance and participation, and document same in the medical record  - Obtain collateral information, encourage visitation and family involvement in care   Outcome: Not Progressing  Goal: Recognize maladaptive responses and adopt new coping mechanisms  Outcome: Not Progressing     Problem: Depression  Goal: Treatment Goal: Demonstrate behavioral control of depressive symptoms, verbalize feelings of improved mood/affect, and adopt new coping skills prior to discharge  Outcome: Not Progressing  Goal: Refrain from isolation  Description  Interventions:  - Develop a trusting relationship   - Encourage socialization   Outcome: Not Progressing  Goal: Refrain from self-neglect  Outcome: Not Progressing     Problem: Nutrition/Hydration-ADULT  Goal: Nutrient/Hydration intake appropriate for improving, restoring or maintaining nutritional needs  Description  Monitor and assess patient's nutrition/hydration status for malnutrition  Collaborate with interdisciplinary team and initiate plan and interventions as ordered  Monitor patient's weight and dietary intake as ordered or per policy  Utilize nutrition screening tool and intervene as necessary  Determine patient's food preferences and provide high-protein, high-caloric foods as appropriate       INTERVENTIONS:  - Monitor oral intake, urinary output, labs, and treatment plans  - Assess nutrition and hydration status and recommend course of action  - Evaluate amount of meals eaten  - Assist patient with eating if necessary   - Allow adequate time for meals  - Recommend/ encourage appropriate diets, oral nutritional supplements, and vitamin/mineral supplements  - Order, calculate, and assess calorie counts as needed  - Recommend, monitor, and adjust tube feedings and TPN/PPN based on assessed needs  - Assess need for intravenous fluids  - Provide specific nutrition/hydration education as appropriate  - Include patient/family/caregiver in decisions related to nutrition  Outcome: Not Progressing   Isolative to her bed in her room, out for meds and meals, did not attend groups  Ate poorly only eating 25% of breakfast and 5% of lunch  No other behaviors or issues noted  Med compliant  Continue to monitor

## 2020-05-07 NOTE — PROGRESS NOTES
[facility] did not attend PM Group  She refused the Incentive Spirometer this evening  She ended up not eating her entire HS snack as could not tolerate the dining room which she said was too noisy, too many people, leaving her feeling churned up  She reports being unable to chew much of the food choices offered on menu (I e  Grilled cheese, pizza) as hasn't any upper teeth & is wanting to see a dietician about alternative choices (I e  Yogurt w/each meal, tuna fish)  *Note left for Dr Michelle Zepeda in Summary bar about this request for his consideration  She did take her HS medicine except the Singulair  Wearing now her QHS humidified nasal O2 @ 1L for bed

## 2020-05-07 NOTE — PROGRESS NOTES
05/07/20 1252   Team Meeting   Meeting Type Daily Rounds   Team Members Present   Team Members Present Physician;;Nurse; Other (Discipline and Name)   Physician Team Member Dr Javon Garcia, [facility] [place]   Care Management Team Member ASHLYN Boucher   Other (Discipline and Name) Shanta Jacques LCSW; Gilford Dull, CPS     No change in behaviors  Slept

## 2020-05-07 NOTE — TREATMENT TEAM
05/07/20 1100   Activity/Group Checklist   Group   (IMR/Motivation )   Attendance Did not attend   Attendance Duration (min) 46-60   Affect/Mood IRMA

## 2020-05-07 NOTE — TREATMENT TEAM
Declined      05/07/20 0900   Activity/Group Checklist   Group Community meeting  (Morning Walk )   Attendance Did not attend   Attendance Duration (min) 16-30   Affect/Mood IRMA

## 2020-05-07 NOTE — PROGRESS NOTES
Maggie maintained on ongoing fall and SAFE precaution  Fabiano Jerson in bed with eyes closed, breath even and unlabored   On O2 with humidifier @1L/m via nasal cannula  Continues rounding implemented   No somatic complaint overnight  No PRN needed for sleep aid   No indication of pain or discomfort   In no distress   Will continue to monitor    VS~97 0   70   18   93/60   93%on O2

## 2020-05-07 NOTE — PLAN OF CARE
Problem: Alteration in Thoughts and Perception  Goal: Verbalize thoughts and feelings  Description  Interventions:  - Promote a nonjudgmental and trusting relationship with the patient through active listening and therapeutic communication  - Assess patient's level of functioning, behavior and potential for risk  - Engage patient in 1 on 1 interactions for a minimum of 15 minutes each session  - Encourage patient to express fears, feelings, frustrations, and discuss symptoms    - [place] patient to reality, help patient recognize reality-based thinking   - Administer medications as ordered and assess for potential side effects  - Provide the patient education related to the signs and symptoms of the illness and desired effects of prescribed medications  Outcome: Progressing  Goal: Agree to be compliant with medication regime, as prescribed and report medication side effects  Description  Interventions:  - Offer appropriate PRN medication and supervise ingestion; conduct aims, as needed   Outcome: Progressing     Problem: Ineffective Coping  Goal: Patient/Family verbalizes awareness of resources  Outcome: Progressing  Goal: Understands least restrictive measures  Description  Interventions:  - Utilize least restrictive behavior  Outcome: Progressing     Problem: Risk for Self Injury/Neglect  Goal: Treatment Goal: Remain safe during length of stay, learn and adopt new coping skills, and be free of self-injurious ideation, impulses and acts at the time of discharge  Outcome: Progressing  Goal: Verbalize thoughts and feelings  Description  Interventions:  - Assess and re-assess patient's lethality and potential for self-injury  - Engage patient in 1:1 interactions, daily, for a minimum of 15 minutes  - Encourage patient to express feelings, fears, frustrations, hopes  - Establish rapport/trust with patient   Outcome: Progressing  Goal: Refrain from harming self  Description  Interventions:  - Monitor patient closely, per order  - Develop a trusting relationship  - Supervise medication ingestion, monitor effects and side effects   Outcome: Progressing     Problem: Depression  Goal: Verbalize thoughts and feelings  Description  Interventions:  - Assess and re-assess patient's level of risk   - Engage patient in 1:1 interactions, daily, for a minimum of 15 minutes   - Encourage patient to express feelings, fears, frustrations, hopes   Outcome: Progressing     Problem: Anxiety  Goal: Anxiety is at manageable level  Description  Interventions:  - Assess and monitor patient's anxiety level  - Monitor for signs and symptoms of anxiety both physical and emotional (heart palpitations, chest pain, shortness of breath, headaches, nausea, feeling jumpy, restlessness, irritable, apprehensive)  - Collaborate with interdisciplinary team and initiate plan and interventions as ordered    - [place] patient to unit/surroundings  - Explain treatment plan  - Encourage participation in care  - Encourage verbalization of concerns/fears  - Identify coping mechanisms  - Assist in developing anxiety-reducing skills  - Administer/offer alternative therapies  - Limit or eliminate stimulants  Outcome: Progressing     Problem: Alteration in Orientation  Goal: Interact with staff daily  Description  Interventions:  - Assess and re-assess patient's level of orientation  - Engage patient in 1 on 1 interactions, daily, for a minimum of 15 minutes   - Establish rapport/trust with patient   Outcome: Progressing     Problem: PAIN - ADULT  Goal: Verbalizes/displays adequate comfort level or baseline comfort level  Description  Interventions:  - Encourage patient to monitor pain and request assistance  - Assess pain using appropriate pain scale  - Administer analgesics based on type and severity of pain and evaluate response  - Implement non-pharmacological measures as appropriate and evaluate response  - Consider cultural and social influences on pain and pain management  - Notify physician/advanced practitioner if interventions unsuccessful or patient reports new pain  Outcome: Progressing     Problem: Alteration in Thoughts and Perception  Goal: Treatment Goal: Gain control of psychotic behaviors/thinking, reduce/eliminate presenting symptoms and demonstrate improved reality functioning upon discharge  Outcome: Not Progressing  Goal: Attend and participate in unit activities, including therapeutic, recreational, and educational groups  Description  Interventions:  - Provide therapeutic and educational activities daily, encourage attendance and participation, and document same in the medical record     CERTIFIED PEER SPECIALIST INTERVENTIONS:    Complete peer assessment with patient to assess their needs and identify their goals to complete while in the recovery program as well as once discharged into the community  Patient will complete WRAP Plan, Crisis Plan and 5 Life Domains  Patient will attend 50% of groups offered on the unit  Patient will complete a goal card weekly      Outcome: Not Progressing  Goal: Recognize dysfunctional thoughts, communicate reality-based thoughts at the time of discharge  Description  Interventions:  - Provide medication and psycho-education to assist patient in compliance and developing insight into his/her illness   Outcome: Not Progressing     Problem: Ineffective Coping  Goal: Participates in unit activities  Description  Interventions:  - Provide therapeutic environment   - Provide required programming   - Redirect inappropriate behaviors   Outcome: Not Progressing     Problem: Risk for Self Injury/Neglect  Goal: Attend and participate in unit activities, including therapeutic, recreational, and educational groups  Description  Interventions:  - Provide therapeutic and educational activities daily, encourage attendance and participation, and document same in the medical record  - Obtain collateral information, encourage visitation and family involvement in care   Outcome: Not Progressing  Goal: Recognize maladaptive responses and adopt new coping mechanisms  Outcome: Not Progressing     Problem: Depression  Goal: Treatment Goal: Demonstrate behavioral control of depressive symptoms, verbalize feelings of improved mood/affect, and adopt new coping skills prior to discharge  Outcome: Not Progressing  Goal: Refrain from isolation  Description  Interventions:  - Develop a trusting relationship   - Encourage socialization   Outcome: Not Progressing  Goal: Refrain from self-neglect  Outcome: Not Progressing   Isolative to her bed in her room, out for meds and meals, did not attend groups  Continues to eat poorly only eating 25% of breakfast and 10% of lunch  No other behaviors or issues noted  Med compliant  Continue to monitor

## 2020-05-07 NOTE — PROGRESS NOTES
Psychiatry Progress Note [address] 60 y o  female MRN: [id number]  Unit/Bed#: MARCIO ADAIR [facility] [address] Encounter: [id number]  Code Status: Level 1 - Full Code    PCP: Judith Morrissey PA-C    Date of Admission:  7/23/2019 1730   Date of Service:  05/07/20  Patient Active Problem List   Diagnosis    COPD with asthma ([facility] )    Tobacco use disorder, continuous    Compression fracture of L4 lumbar vertebra    Ventral hernia    Acute on chronic respiratory failure with hypoxia ([facility] )    Schizoaffective disorder, bipolar type ([facility] )    Acquired hypothyroidism    Gastroesophageal reflux disease without esophagitis    Abnormal CT of the chest    Excessive cerumen in left ear canal    Lipoma of right upper extremity    Noncompliant with deep vein thrombosis (DVT) prophylaxis    At risk for aspiration    Ear ache    Tachycardia    Chest pain    Low HDL (under 40)     Diagnosis schizoaffective bipolar  Assessment   Overall Status:  Again psychosomatic refusing groups and refusing to eat at times staying on bed most of the time   Certification Statement to demonstrate a period of stability by attending to ADLs and becoming less psychosomatic in attending more groups Acceptance by patient:  Accepting  Fidela Saint in recovery:  Living at another personal care home   Understanding of medications: Yes    Involved in reintegration process: On hold due to [address] in relationship with psychiatrist:  Trusting sometimes    Medication changes    None today    Non-pharmacological treatments   Continue with individual, group, milieu and occupational therapy using recovery principles and psycho-education about accepting illness and the need for treatment   Encourage to take showers twice a week and cooperate with treatment and adl skills as per her behav   Plan   Another therapeutic pass with sister now that she did well  with the assigned staff escort to the park but it is now on hold due to corana virus   Prepare for the [address] [place] [address] Rd and encouraged to accept  rather than refuse it   Dietary consult ordered at her request   Reminded to attend at least 25% of groups on a daily basis including [facility]    Safety   Safety and communication plan established to target dynamic risk factors discussed above  Discharge Plan   · back to a [facility]    Interval Progress   Poorly groomed despite having taken a shower 2 days ago and still refusing to get out of bed or attend groups claiming to be tired and anxious  Continues to believe that she is dying from multiple illnesses despite frequent reassurance and support by staff  She continues to be suspicious about her medications examining them personally before taking them and continues to accused staff of tampering with her oxygen concentrator and the spirometer  She has a tendency not to groom herself and engages in self loathing  She knows that she needs to go to groups and attend to ADLs skills to be able to return to the [place] personal care home where she already has a bed as she has no other choices at this time  She tells me she will try hard to go back to the personal care home and  Now she is asking to meet the dietitian for alternative food choices because of lack of upper teeth and has been picking and refusing to eat certain foods and hence consult was ordered  She is passive-aggressive Verlon Pulse telling me that he she will attend the required groups but she does not and I again pointed that out to her and she again promises that she will try to attend groups again    Sleep:   Fair  Appetite:   Fair but she picks and chooses her food and now asking to see a dietitian  Compliance with medications:  Good  Side effects:   none but claims to feel tired sometimes  Review of systems:  Continues to have psychosomatic symptoms     Mental Status Exam  Appearance:  Found in her room sitting up     Looks older than her stated age, poorly  groomed and not very well kept, with uncombed hairt but  wearing clean clothing   Anxious preoccupied  Has good eye contact   Behavior:  Superficially friendly pleasant but anxious suspicious  Speech:  tangential at times with tendency to become stilted   Mood:   anxious irritated times,    Affect: increased in intensity range mood congruent redirectable  Thought Process:  Circumstantial with tendency to have stilted speech   Thought Content:  Reports some paranoia about motives of staff switching her medications or tampering with her inhalant or her oxygen concentrator off and on  She also has to examine her pills literally before taking them  No preoccupation with violence or suicide  No current suicidal homicidal thoughts intent or plans reported  No phobias but has obsessions and compulsions about examining her pills before she takes them  Margo Skipper Psychosomatic about dying by choking from food and from heart attack or from shortness of breath and now from catching Covid-19 which are all ongoing believes that she has  Perceptual Disturbances:  None reported not appearing responding to internal stimuli   Risk Potential:  Ability to care for herself and refusal to take showers  Sensorium: fully oriented to place, person, time, date, day, month, year and to situation  Cognition:  Grossly intact, aware of current events like the corona virus situation, recent and remote memory intact    No language deficit  Consciousness:  Alert and awake  Attention and concentration:  fair  Intellect:  average  Insight:  limited  Judgment:  fair  Motor Activity: No abnormal involuntary movement noted today    Vitals  Temp:  [97 °F (36 1 °C)-97 5 °F (36 4 °C)] 97 °F (36 1 °C)  HR:  [70-82] 70  Resp:  [18-20] 20  BP: ()/(57-60) 93/60  SpO2:  [93 %] 93 %  No intake or output data in the 24 hours ending 05/07/20 3950    Lab Results: No New Labs Available For Today          Current Facility-Administered Medications:  acetaminophen 325 mg Oral Q6H PRN Zac Sierra MD   acetaminophen 650 mg Oral Q6H PRN Zac Sierra MD   acetaminophen 650 mg Oral Q8H PRN Zac Sierra MD   albuterol 2 puff Inhalation Q4H PRN Zac Sierra MD   aluminum-magnesium hydroxide-simethicone 15 mL Oral Q4H PRN Zac Sierra MD   ammonium lactate 1 application Topical BID PRN Zac Sierra MD   benzonatate 100 mg Oral TID PRN Zac Sierra MD   benztropine 1 mg Intramuscular Q8H PRN Zac Sierra MD   carbamide peroxide 5 drop Left Ear BID PRN Zac Sierra MD   cloZAPine 25 mg Oral BID Zac Sierra MD   cloZAPine 50 mg Oral BID Zac Sierra MD   docusate sodium 100 mg Oral BID PRN Zac Sierra MD   EPINEPHrine PF 0 15 mg Intramuscular Once PRN Zac Sierra MD   fluticasone-vilanterol 1 puff Inhalation Daily Zac Sierra MD   ketotifen 1 drop Right Eye BID PRN Zac Sierra MD   levothyroxine 125 mcg Oral Early Morning Zac Sierra MD   magnesium hydroxide 30 mL Oral Daily PRN Zac Sierra MD   montelukast 10 mg Oral HS Zac Sierra MD   OLANZapine 5 mg Intramuscular Q8H PRN Zac Sierra MD   OLANZapine 5 mg Oral Q8H PRN Zac Sierra MD   ondansetron 4 mg Oral Q6H PRN Zac Sierra MD   pantoprazole 40 mg Oral Early Morning Zac Sierra MD   polyethylene glycol 17 g Oral Daily PRN Zac Sierra MD   polyvinyl alcohol 1 drop Both Eyes Q3H PRN Zac Sierra MD   sertraline 175 mg Oral Daily Zac Sierra MD   sucralfate 1,000 mg Oral BID Zac Sierra MD   theophylline 200 mg Oral Daily Zac Sierra MD   tiotropium 18 mcg Inhalation Daily Zac Sierra MD   traZODone 25 mg Oral HS PRN Zac iSerra MD       Counseling / Coordination of Care: Total floor / unit time spent today 15 minutes  Greater than 50% of total time was spent with the patient and / or family counseling and / or somewhat receptive to supportive listening and teaching positive coping skills to deal with symptom mangement       Patient's Rights, confidentiality and exceptions to confidentiality, use of automated medical record, [address] staff access to medical record, and consent to treatment reviewed

## 2020-05-08 NOTE — PROGRESS NOTES
05/08/20 0900   Activity/Group Checklist   Group Community meeting  (Morning Walk)   Attendance Did not attend     Patient was personally prompted to attend, but declined    Patient was sitting up in bed when declined

## 2020-05-08 NOTE — PROGRESS NOTES
05/08/20 9983   Team Meeting   Meeting Type Daily Rounds   Team Members Present   Team Members Present Physician;Nurse;; Other (Discipline and Name)   Physician Team Member Dr Beth Kang, RN   Care Management Team Member ASHLYN Chow   Other (Discipline and Name) Julia Duran LCSW     No groups, no appetite

## 2020-05-08 NOTE — PROGRESS NOTES
05/08/20 1100   Activity/Group Checklist   Group Other (Comment)  ([facility] - [facility])   Attendance Did not attend     Patient prompted for group today, declined  Awake in bed when prompted

## 2020-05-08 NOTE — PLAN OF CARE
Problem: Alteration in Thoughts and Perception  Goal: Treatment Goal: Gain control of psychotic behaviors/thinking, reduce/eliminate presenting symptoms and demonstrate improved reality functioning upon discharge  5/8/2020 1139 by Angie Villafana RN  Outcome: Progressing  5/8/2020 1135 by Angie Villafana RN  Outcome: Progressing  5/8/2020 1130 by Angie Villafana RN  Outcome: Progressing  Goal: Verbalize thoughts and feelings  Description  Interventions:  - Promote a nonjudgmental and trusting relationship with the patient through active listening and therapeutic communication  - Assess patient's level of functioning, behavior and potential for risk  - Engage patient in 1 on 1 interactions for a minimum of 15 minutes each session  - Encourage patient to express fears, feelings, frustrations, and discuss symptoms    - [place] patient to reality, help patient recognize reality-based thinking   - Administer medications as ordered and assess for potential side effects  - Provide the patient education related to the signs and symptoms of the illness and desired effects of prescribed medications  5/8/2020 1139 by Angie Villafana RN  Outcome: Progressing  5/8/2020 1135 by Angie Villafana RN  Outcome: Progressing  5/8/2020 1130 by Angie Villafana RN  Outcome: Progressing  Goal: Agree to be compliant with medication regime, as prescribed and report medication side effects  Description  Interventions:  - Offer appropriate PRN medication and supervise ingestion; conduct aims, as needed   Outcome: Progressing  Goal: Attend and participate in unit activities, including therapeutic, recreational, and educational groups  Description  Interventions:  - Provide therapeutic and educational activities daily, encourage attendance and participation, and document same in the medical record     CERTIFIED PEER SPECIALIST INTERVENTIONS:    Complete peer assessment with patient to assess their needs and identify their goals to complete while in the recovery program as well as once discharged into the community  Patient will complete WRAP Plan, Crisis Plan and [facility]  Patient will attend 50% of groups offered on the unit  Patient will complete a goal card weekly      Outcome: Progressing  Goal: Recognize dysfunctional thoughts, communicate reality-based thoughts at the time of discharge  Description  Interventions:  - Provide medication and psycho-education to assist patient in compliance and developing insight into his/her illness   Outcome: Progressing  Goal: Complete daily ADLs, including personal hygiene independently, as able  Description  Interventions:  - Observe, teach, and assist patient with ADLS  - Monitor and promote a balance of rest/activity, with adequate nutrition and elimination   Outcome: Progressing     Problem: Ineffective Coping  Goal: Identifies ineffective coping skills  5/8/2020 1139 by Alirio Lackey RN  Outcome: Progressing  5/8/2020 1135 by Alirio Lackey RN  Outcome: Progressing  5/8/2020 1130 by Alirio Lackey RN  Outcome: Progressing  Goal: Identifies healthy coping skills  5/8/2020 1135 by Alirio Lackey RN  Outcome: Progressing  5/8/2020 1130 by Alirio Lackey RN  Outcome: Progressing  Goal: Demonstrates healthy coping skills  Outcome: Progressing  Goal: Participates in unit activities  Description  Interventions:  - Provide therapeutic environment   - Provide required programming   - Redirect inappropriate behaviors   Outcome: Progressing  Goal: Patient/Family participate in treatment and DC plans  Description  Interventions:  - Provide therapeutic environment  Outcome: Progressing  Goal: Patient/Family verbalizes awareness of resources  Outcome: Progressing  Goal: Understands least restrictive measures  Description  Interventions:  - Utilize least restrictive behavior  Outcome: Progressing     Problem: Risk for Self Injury/Neglect  Goal: Treatment Goal: Remain safe during length of stay, learn and adopt new coping skills, and be free of self-injurious ideation, impulses and acts at the time of discharge  Outcome: Progressing  Goal: Verbalize thoughts and feelings  Description  Interventions:  - Assess and re-assess patient's lethality and potential for self-injury  - Engage patient in 1:1 interactions, daily, for a minimum of 15 minutes  - Encourage patient to express feelings, fears, frustrations, hopes  - Establish rapport/trust with patient   5/8/2020 1135 by Soren Tipton RN  Outcome: Progressing  5/8/2020 1130 by Soren Tipton RN  Outcome: Progressing  Goal: Refrain from harming self  Description  Interventions:  - Monitor patient closely, per order  - Develop a trusting relationship  - Supervise medication ingestion, monitor effects and side effects   Outcome: Progressing  Goal: Attend and participate in unit activities, including therapeutic, recreational, and educational groups  Description  Interventions:  - Provide therapeutic and educational activities daily, encourage attendance and participation, and document same in the medical record  - Obtain collateral information, encourage visitation and family involvement in care   Outcome: Progressing  Goal: Recognize maladaptive responses and adopt new coping mechanisms  Outcome: Progressing  Goal: Complete daily ADLs, including personal hygiene independently, as able  Description  Interventions:  - Observe, teach, and assist patient with ADLS  - Monitor and promote a balance of rest/activity, with adequate nutrition and elimination  Outcome: Progressing     Problem: Depression  Goal: Treatment Goal: Demonstrate behavioral control of depressive symptoms, verbalize feelings of improved mood/affect, and adopt new coping skills prior to discharge  5/8/2020 1139 by Soren Tipton RN  Outcome: Progressing  5/8/2020 1135 by Soren Tipton RN  Outcome: Progressing  5/8/2020 1130 by Janelle Fuentes RN  Outcome: Progressing  Goal: Verbalize thoughts and feelings  Description  Interventions:  - Assess and re-assess patient's level of risk   - Engage patient in 1:1 interactions, daily, for a minimum of 15 minutes   - Encourage patient to express feelings, fears, frustrations, hopes   5/8/2020 1135 by Janelle Fuentes RN  Outcome: Progressing  5/8/2020 1130 by Janelle Fuentes RN  Outcome: Progressing  Goal: Refrain from isolation  Description  Interventions:  - Develop a trusting relationship   - Encourage socialization   Outcome: Progressing  Goal: Refrain from self-neglect  Outcome: Progressing     Problem: Anxiety  Goal: Anxiety is at manageable level  Description  Interventions:  - Assess and monitor patient's anxiety level  - Monitor for signs and symptoms of anxiety both physical and emotional (heart palpitations, chest pain, shortness of breath, headaches, nausea, feeling jumpy, restlessness, irritable, apprehensive)  - Collaborate with interdisciplinary team and initiate plan and interventions as ordered    - [place] patient to unit/surroundings  - Explain treatment plan  - Encourage participation in care  - Encourage verbalization of concerns/fears  - Identify coping mechanisms  - Assist in developing anxiety-reducing skills  - Administer/offer alternative therapies  - Limit or eliminate stimulants  5/8/2020 1139 by Janelle Fuentes RN  Outcome: Progressing  5/8/2020 1130 by Janelle Fuentes RN  Outcome: Progressing     Problem: Alteration in Orientation  Goal: Interact with staff daily  Description  Interventions:  - Assess and re-assess patient's level of orientation  - Engage patient in 1 on 1 interactions, daily, for a minimum of 15 minutes   - Establish rapport/trust with patient   Outcome: Progressing  Goal: Cooperate with recommended testing/procedures  Description  Interventions:  - Determine need for ancillary testing  - Observe for mental status changes  - Implement falls/precaution protocol   Outcome: Progressing     Problem: PAIN - ADULT  Goal: Verbalizes/displays adequate comfort level or baseline comfort level  Description  Interventions:  - Encourage patient to monitor pain and request assistance  - Assess pain using appropriate pain scale  - Administer analgesics based on type and severity of pain and evaluate response  - Implement non-pharmacological measures as appropriate and evaluate response  - Consider cultural and social influences on pain and pain management  - Notify physician/advanced practitioner if interventions unsuccessful or patient reports new pain  5/8/2020 1139 by Elena Howe RN  Outcome: Progressing  5/8/2020 1135 by Elena Howe RN  Outcome: Progressing  5/8/2020 1130 by Elena Howe RN  Outcome: Progressing     Problem: SAFETY ADULT  Goal: Patient will remain free of falls  Description  INTERVENTIONS:  - Assess patient frequently for physical needs  -  Identify cognitive and physical deficits and behaviors that affect risk of falls    -  [place] fall precautions as indicated by assessment   - Educate patient/family on patient safety including physical limitations  - Instruct patient to call for assistance with activity based on assessment  - Modify environment to reduce risk of injury  - Consider OT/PT consult to assist with strengthening/mobility  5/8/2020 1139 by Elena Howe RN  Outcome: Progressing  5/8/2020 1135 by Elena Howe RN  Outcome: Progressing  5/8/2020 1130 by Elena Howe RN  Outcome: Progressing     Problem: RESPIRATORY - ADULT  Goal: Achieves optimal ventilation and oxygenation  Description  INTERVENTIONS:  - Assess for changes in respiratory status  - Assess for changes in mentation and behavior  - Position to facilitate oxygenation and minimize respiratory effort  - Oxygen administration by appropriate delivery method based on oxygen saturation (per order) or ABGs  - Initiate smoking cessation education as indicated  - Encourage broncho-pulmonary hygiene including cough, deep breathe, Incentive Spirometry  - Assess the need for suctioning and aspirate as needed  - Assess and instruct to report SOB or any respiratory difficulty  - Respiratory Therapy support as indicated  Outcome: Progressing     Problem: DISCHARGE PLANNING  Goal: Discharge to home or other facility with appropriate resources  Description  INTERVENTIONS:  - Conduct assessment to determine patient/family and health care team treatment goals, and need for post-acute services based on payer coverage, community resources, and patient preferences, and barriers to discharge  - Address psychosocial, clinical, and financial barriers to discharge as identified in assessment in conjunction with the patient/family and health care team  - Assist the patient in reintegration back into the community by removing barriers which may hinder a successful discharge once deemed stable  - Arrange appropriate level of post-acute services according to patient's needs and preference and payer coverage in collaboration with the physician and health care team  - Communicate with and update the patient/family, physician, and health care team regarding progress on the discharge plan  - Arrange appropriate transportation to post-acute venues    Outcome: Progressing     Problem: SLEEP DISTURBANCE  Goal: Will exhibit normal sleeping pattern  Description  Interventions:  -  Assess the patients sleep pattern, noting recent changes  - Administer medication as ordered  - Decrease environmental stimuli, including noise, as appropriate during the night  - Encourage the patient to actively participate in unit groups and or exercise during the day to enhance ability to achieve adequate sleep at night  - Assess the patient, in the morning, encouraging a description of sleep experience  Outcome: Progressing     Problem: Nutrition/Hydration-ADULT  Goal: Nutrient/Hydration intake appropriate for improving, restoring or maintaining nutritional needs  Description  Monitor and assess patient's nutrition/hydration status for malnutrition  Collaborate with interdisciplinary team and initiate plan and interventions as ordered  Monitor patient's weight and dietary intake as ordered or per policy  Utilize nutrition screening tool and intervene as necessary  Determine patient's food preferences and provide high-protein, high-caloric foods as appropriate       INTERVENTIONS:  - Monitor oral intake, urinary output, labs, and treatment plans  - Assess nutrition and hydration status and recommend course of action  - Evaluate amount of meals eaten  - Assist patient with eating if necessary   - Allow adequate time for meals  - Recommend/ encourage appropriate diets, oral nutritional supplements, and vitamin/mineral supplements  - Order, calculate, and assess calorie counts as needed  - Recommend, monitor, and adjust tube feedings and TPN/PPN based on assessed needs  - Assess need for intravenous fluids  - Provide specific nutrition/hydration education as appropriate  - Include patient/family/caregiver in decisions related to nutrition  5/8/2020 1139 by Jaskaran Poole RN  Outcome: Progressing  5/8/2020 1130 by Jaskaran Poole RN  Outcome: Progressing     Problem: SKIN/TISSUE INTEGRITY - ADULT  Goal: Skin integrity remains intact  Description  INTERVENTIONS  - Identify patients at risk for skin breakdown  - Assess and monitor skin integrity  - Assess and monitor nutrition and hydration status  - Monitor labs (i e  albumin)  - Assess for incontinence   - Turn and reposition patient  - Assist with mobility/ambulation  - Relieve pressure over bony prominences  - Avoid friction and shearing  - Provide appropriate hygiene as needed including keeping skin clean and dry  - Evaluate need for skin moisturizer/barrier cream  - Collaborate with interdisciplinary team (i e  Nutrition, Rehabilitation, etc )   - Patient/family teaching  Outcome: Progressing  Goal: Incision(s), wounds(s) or drain site(s) healing without S/S of infection  Description  INTERVENTIONS  - Assess and document risk factors for skin impairment   - Assess and document dressing, incision, wound bed, drain sites and surrounding tissue  - Consider nutrition services referral as needed  - Oral mucous membranes remain intact  - Provide patient/ family education  Outcome: Progressing   Patient isolative to her room  Refused groups this am  Continue to be negative   Encouraged with meals and hygiene

## 2020-05-08 NOTE — CONSULTS
Pt requested to see RD for food alternatives because of lack of upper teeth  Offered pt to write "ground" next to any food item she orders  Also reminded her that in the past I told her she can order egg salad or tuna salad  Pt was satisfied with that option

## 2020-05-08 NOTE — PROGRESS NOTES
Psychiatry Progress Note [address] 60 y o  female MRN: [id number]  Unit/Bed#: MARCIO ADA[facility] 111-01 Encounter: [id number]  Code Status: Level 1 - Full Code    PCP: Aspen Metzger PA-C    Date of Admission:  7/23/2019 5269   Date of Service:  05/08/20  Patient Active Problem List   Diagnosis    COPD with asthma ([facility]    Tobacco use disorder, continuous    Compression fracture of L4 lumbar vertebra    Ventral hernia    Acute on chronic respiratory failure with hypoxia ([facility] )    [facility]ive [facility], [facility]    Acquired hypothyroidism    Gastroesophageal reflux disease without esophagitis    Abnormal CT of the chest    Excessive cerumen in left ear canal    Lipoma of right upper extremity    Noncompliant with deep vein thrombosis (DVT) prophylaxis    At risk for aspiration    Ear ache    Tachycardia    Chest pain    Low HDL (under 40)     Diagnosis schizoaffective bipolar  Assessment   Overall Status:  Now believes her blood sugar has dropped and claims she has diabetes and is still psychosomatic preoccupied and hardly attends groups   Certification Statement to demonstrate a period of stability by attending to ADLs and becoming less psychosomatic in attending more groups Acceptance by patient:  Accepting  Meldon Leisure in recovery:  Living at another personal care home   Understanding of medications: Yes    Involved in reintegration process: On hold due to [address] in relationship with psychiatrist:  Trusting sometimes    Medication changes    None today    Non-pharmacological treatments   Continue with individual, group, milieu and occupational therapy using recovery principles and psycho-education about accepting illness and the need for treatment   Encourage to take showers twice a week and cooperate with treatment and adl skills as per her behav   Plan   Another therapeutic pass with sister now that she did well  with the assigned staff escort to the park but it is now on hold due to [address] virus   Prepare for the [address] [place] [address] Rd and encouraged to accept  rather than refuse it   Dietary consult ordered at her request   Reminded to attend at least 25% of groups on a daily basis including IMR    Safety   Safety and communication plan established to target dynamic risk factors discussed above  Discharge Plan   · back to a [facility] at [address] poorly groomed not attending all the groups claiming to be tired anxious and now believes that she her blood sugar is low and thing she has diabetes!    She continues to question the motives of different staff examining her pills before taking them and accusing them of tampering with her oxygen concentrator and spirometer  She knows that she has to get up and attend to ADLs to be able to return to the [facility] were she already toured and was accepted    Sleep:   Fair  Appetite:   Fair but she picks and chooses her food and now asking to see a dietitian  Compliance with medications:  Good  Side effects:   none but claims to feel tired sometimes  Review of systems:  Continues to have psychosomatic symptoms     Mental Status Exam  Appearance:  Sitting up on her bed in her room and has adjusted to the room change     Looks older than her stated age, poorly  groomed and not very well kept, with uncombed hairt but  wearing clean clothing   Anxious preoccupied  Has good eye contact   Behavior:  Superficially friendly pleasant but anxious suspicious  Speech:  tangential at times with tendency to become stilted   Mood:   anxious irritated times,    Affect: increased in intensity range mood congruent redirectable  Thought Process:  Circumstantial with tendency to have stilted speech   Thought Content:  Reports some paranoia about motives of staff switching her medications or tampering with her inhalant or her oxygen concentrator off and on     She also has to examine her pills literally before taking them  No preoccupation with violence or suicide  No current suicidal homicidal thoughts intent or plans reported  No phobias but has obsessions and compulsions about examining her pills before she takes them  Salazar Zuniga Psychosomatic about dying by choking from food and from heart attack or from shortness of breath and now from catching Covid-19 which are all ongoing believes that she has  Perceptual Disturbances:  None reported not appearing responding to internal stimuli   Risk Potential:  Ability to care for herself and refusal to take showers  Sensorium: fully oriented to place, person, time, date, day, month, year and to situation  Cognition:  Grossly intact, aware of current events like the corona virus situation, recent and remote memory intact    No language deficit  Consciousness:  Alert and awake  Attention and concentration:  fair  Intellect:  average  Insight:  limited  Judgment:  fair  Motor Activity: No abnormal involuntary movement noted today    Vitals  Temp:  [96 8 °F (36 °C)-97 °F (36 1 °C)] 97 °F (36 1 °C)  HR:  [89-96] 89  Resp:  [16-19] 19  BP: (105-129)/(74-79) 105/79  SpO2:  [90 %-95 %] 95 %  No intake or output data in the 24 hours ending 05/08/20 1134    Lab Results: No New Labs Available For Today  Results from last 7 days   Lab Units 05/08/20  0609   WBC Thousand/uL 7 40   RBC Million/uL 4 71   HEMOGLOBIN g/dL 14 5   HEMATOCRIT % 43 6   MCV fL 93   PLATELETS Thousands/uL 209   NEUTROS ABS Thousands/µL 3 70         Current Facility-Administered Medications:  acetaminophen 325 mg Oral Q6H PRN Ivonne Nevarez MD   acetaminophen 650 mg Oral Q6H PRN Ivonne Nevarez MD   acetaminophen 650 mg Oral Q8H PRN Ivonne Nevarez MD   albuterol 2 puff Inhalation Q4H PRN Ivonne Nevarez MD   aluminum-magnesium hydroxide-simethicone 15 mL Oral Q4H PRN Ivonne Nevarez MD   ammonium lactate 1 application Topical BID PRN Ivonne Nevarez MD   benzonatate 100 mg Oral TID PRN Ivonne Nevarez MD benztropine 1 mg Intramuscular Q8H PRN Ivonne Nevarez MD   carbamide peroxide 5 drop Left Ear BID PRN Ivonne Nevarez MD   cloZAPine 25 mg Oral BID Ivonne Nevarez MD   cloZAPine 50 mg Oral BID Ivonne Nevarez MD   docusate sodium 100 mg Oral BID PRN Ivonne Nevarez MD   EPINEPHrine PF 0 15 mg Intramuscular Once PRN Ivonne Nevarez MD   fluticasone-vilanterol 1 puff Inhalation Daily Ivonne Nevarez MD   ketotifen 1 drop Right Eye BID PRN Ivonne Nevarez MD   levothyroxine 125 mcg Oral Early Morning Ivonne Nevarez MD   magnesium hydroxide 30 mL Oral Daily PRN Ivonne Nevarez MD   montelukast 10 mg Oral HS Ivonne Nevarez MD   OLANZapine 5 mg Intramuscular Q8H PRN Ivonne Nevarez MD   OLANZapine 5 mg Oral Q8H PRN Ivonne Nevarez MD   ondansetron 4 mg Oral Q6H PRN Ivonne Nevarez MD   pantoprazole 40 mg Oral Early Morning Ivonne Nevarez MD   polyethylene glycol 17 g Oral Daily PRN Ivonne Nevarez MD   polyvinyl alcohol 1 drop Both Eyes Q3H PRN Ivonne Nevarez MD   sertraline 175 mg Oral Daily Ivonne Nevarez MD   sucralfate 1,000 mg Oral BID Ivonne Nevarez MD   theophylline 200 mg Oral Daily Ivonne Nevarez MD   tiotropium 18 mcg Inhalation Daily Ivonne Nevarez MD   traZODone 25 mg Oral HS PRN Ivonne Nevarez MD       Counseling / Coordination of Care: Total floor / unit time spent today 15 minutes  Greater than 50% of total time was spent with the patient and / or family counseling and / or somewhat receptive to supportive listening and teaching positive coping skills to deal with symptom mangement  Patient's Rights, confidentiality and exceptions to confidentiality, use of automated medical record, [facility] adeline staff access to medical record, and consent to treatment reviewed

## 2020-05-08 NOTE — PLAN OF CARE
Problem: Alteration in Thoughts and Perception  Goal: Verbalize thoughts and feelings  Description  Interventions:  - Promote a nonjudgmental and trusting relationship with the patient through active listening and therapeutic communication  - Assess patient's level of functioning, behavior and potential for risk  - Engage patient in 1 on 1 interactions for a minimum of 15 minutes each session  - Encourage patient to express fears, feelings, frustrations, and discuss symptoms    - Widen patient to reality, help patient recognize reality-based thinking   - Administer medications as ordered and assess for potential side effects  - Provide the patient education related to the signs and symptoms of the illness and desired effects of prescribed medications  Outcome: Progressing  Goal: Agree to be compliant with medication regime, as prescribed and report medication side effects  Description  Interventions:  - Offer appropriate PRN medication and supervise ingestion; conduct aims, as needed   Outcome: Progressing     Problem: Alteration in Orientation  Goal: Interact with staff daily  Description  Interventions:  - Assess and re-assess patient's level of orientation  - Engage patient in 1 on 1 interactions, daily, for a minimum of 15 minutes   - Establish rapport/trust with patient   Outcome: Progressing     Problem: Alteration in Thoughts and Perception  Goal: Attend and participate in unit activities, including therapeutic, recreational, and educational groups  Description  Interventions:  - Provide therapeutic and educational activities daily, encourage attendance and participation, and document same in the medical record     CERTIFIED PEER SPECIALIST INTERVENTIONS:    Complete peer assessment with patient to assess their needs and identify their goals to complete while in the recovery program as well as once discharged into the community       Patient will complete WRAP Plan, Crisis Plan and 5 Life Domains  Patient will attend 50% of groups offered on the unit  Patient will complete a goal card weekly      Outcome: Not Progressing     Ate 25% of dinner and had a snack before bed, isolative, needed prompting for routine medications, gait steady, denied pain, stated that she is unhappy with her room changed and reported that she needed help walking from the dinning room to her room due to "my agoraphobia" will continue to monitor

## 2020-05-08 NOTE — PROGRESS NOTES
~Maggie maintained on ongoing fall and SAFE precaution   Laying in bed with eyes closed, breath even and unlabored   On O2 with humidifier @1L/m via nasal cannula  Continues rounding implemented   No somatic complaint overnight  No PRN needed for sleep aid   No indication of pain or discomfort   In no distress and is adjusting well to room changes   Will continue to monitor    ~Maggie has a schedules lab CBC w/Diff to be obtain in the morning

## 2020-05-09 NOTE — PROGRESS NOTES
Psychiatry Progress Note    Subjective: Interval History     Pt still isolative to her room throughout the day  Reports that she laying down resting today as she is having some gas pain at the bottom of her esophagus  Continues to be disheveled in appearance  Still with only fair po intake as she continues to primarily  Drink ensures with meals  Admits to having some anxiety over somatic complaints; denies depression, SI and HI  Sleeping well      Behavior over the last 24 hours:  unchanged  Sleep: normal  Appetite: Fair  Medication side effects: No  ROS: no complaints    Current medications:    Current Facility-Administered Medications:     acetaminophen (TYLENOL) tablet 325 mg, 325 mg, Oral, Q6H PRN, Manuel Copeland MD    acetaminophen (TYLENOL) tablet 650 mg, 650 mg, Oral, Q6H PRN, Manuel Copeland MD    acetaminophen (TYLENOL) tablet 650 mg, 650 mg, Oral, Q8H PRN, Manuel Copeland MD    albuterol (PROVENTIL HFA,VENTOLIN HFA) inhaler 2 puff, 2 puff, Inhalation, Q4H PRN, Manuel Copeland MD, 2 puff at 10/11/19 0424    aluminum-magnesium hydroxide-simethicone (MYLANTA) 200-200-20 mg/5 mL oral suspension 15 mL, 15 mL, Oral, Q4H PRN, Manuel Copeland MD, 15 mL at 01/26/20 1021    ammonium lactate (LAC-HYDRIN) 12 % lotion 1 application, 1 application, Topical, BID PRN, Manuel Copeland MD    benzonatate (TESSALON PERLES) capsule 100 mg, 100 mg, Oral, TID PRN, Manuel Copeland MD    benztropine (COGENTIN) injection 1 mg, 1 mg, Intramuscular, Q8H PRN, Manuel Copeland MD    carbamide peroxide [place] [facility]) 6 5 % otic solution 5 drop, 5 drop, Left Ear, BID PRN, Manuel Copeland MD, 5 drop at 04/15/20 2128    cloZAPine (CLOZARIL) tablet 25 mg, 25 mg, Oral, BID, Manuel Copeland MD, 25 mg at 05/08/20 2112    cloZAPine (CLOZARIL) tablet 50 mg, 50 mg, Oral, BID, Manuel Copeland MD, 50 mg at 05/08/20 2112    docusate sodium (COLACE) capsule 100 mg, 100 mg, Oral, BID PRN, Manuel Copeland MD    EPINEPHrine PF (ADRENALIN) 1 mg/mL injection 0 15 mg, 0 15 mg, Intramuscular, Once PRN, Chichi Lockett MD    fluticasone-vilanterol (BREO ELLIPTA) 200-25 MCG/INH inhaler 1 puff, 1 puff, Inhalation, Daily, Chichi Lockett MD, 1 puff at 05/09/20 0826    ketotifen (ZADITOR) 0 025 % ophthalmic solution 1 drop, 1 drop, Right Eye, BID PRN, Chichi Lockett MD    levothyroxine tablet 125 mcg, 125 mcg, Oral, Early Morning, Chichi Lockett MD, 125 mcg at 05/09/20 0604    magnesium hydroxide (MILK OF MAGNESIA) 400 mg/5 mL oral suspension 30 mL, 30 mL, Oral, Daily PRN, Chichi Lockett MD    montelukast (SINGULAIR) tablet 10 mg, 10 mg, Oral, HS, Chichi Lockett MD, 10 mg at 02/22/20 2104    OLANZapine (ZyPREXA) IM injection 5 mg, 5 mg, Intramuscular, Q8H PRN, Chichi Lockett MD    OLANZapine (ZyPREXA) tablet 5 mg, 5 mg, Oral, Q8H PRN, Chichi Lockett MD    ondansetron (ZOFRAN-ODT) dispersible tablet 4 mg, 4 mg, Oral, Q6H PRN, Chichi Lockett MD, 4 mg at 12/09/19 1757    pantoprazole (PROTONIX) EC tablet 40 mg, 40 mg, Oral, Early Morning, Chichi Lockett MD, 40 mg at 05/09/20 0604    polyethylene glycol (MIRALAX) packet 17 g, 17 g, Oral, Daily PRN, Chichi Lockett MD    polyvinyl alcohol (LIQUIFILM TEARS) 1 4 % ophthalmic solution 1 drop, 1 drop, Both Eyes, Q3H PRN, Chichi Lockett MD    sertraline (ZOLOFT) tablet 175 mg, 175 mg, Oral, Daily, Chichi Lockett MD, 175 mg at 05/09/20 5329    sucralfate (CARAFATE) oral suspension 1,000 mg, 1,000 mg, Oral, BID, Chichi Lockett MD, 1,000 mg at 05/09/20 0604    theophylline (JEF-24) 24 hr capsule 200 mg, 200 mg, Oral, Daily, Chichi Lockett MD, 200 mg at 05/09/20 0826    tiotropium (SPIRIVA) capsule for inhaler 18 mcg, 18 mcg, Inhalation, Daily, Chichi Lockett MD, 18 mcg at 05/09/20 6274    traZODone (DESYREL) tablet 25 mg, 25 mg, Oral, HS PRN, Chichi Lockett MD    Current Problem List:    Patient Active Problem List   Diagnosis    COPD with asthma ([facility]    Tobacco use disorder, continuous    Compression fracture of L4 lumbar vertebra    Ventral hernia    Acute on chronic respiratory failure with hypoxia (HCC)    [facility], [facility]    Acquired hypothyroidism    Gastroesophageal reflux disease without esophagitis    Abnormal CT of the chest    Excessive cerumen in left ear canal    Lipoma of right upper extremity    Noncompliant with deep vein thrombosis (DVT) prophylaxis    At risk for aspiration    Ear ache    Tachycardia    Chest pain    Low HDL (under 40)       Problem list reviewed 05/09/20     Objective:     Vital Signs:  Vitals:    05/08/20 0700 05/08/20 2000 05/09/20 0520 05/09/20 0700   BP: 105/79 96/56 102/52 104/65   BP Location: Left arm Left arm Left arm Left arm   Pulse: 89 76 73 78   Resp: 19 14  18   Temp: (!) 97 °F (36 1 °C) (!) 97 3 °F (36 3 °C)  98 °F (36 7 °C)   TempSrc: Temporal Temporal  Temporal   SpO2:  90% 96%    Weight:       Height:             Appearance:  age appropriate, casually dressed and disheveled   Behavior:  normal   Speech:  normal volume   Mood:  anxious   Affect:  constricted   Thought Process:  normal   Thought Content:  delusions  somatic   Perceptual Disturbances: None   Risk Potential: none   Sensorium:  person, place and time   Cognition:  intact   Consciousness:  alert and awake    Attention: attention span and concentration were age appropriate   Intellect: average   Insight:  poor   Judgment: poor      Motor Activity: no abnormal movements       I/O Past 24 hours:  No intake/output data recorded  No intake/output data recorded  Labs:  Reviewed 05/09/20    Progress Toward Goals:  unchanged    Assessment / Plan:     Schizoaffective disorder, bipolar type ([facility] )    Recommended Treatment:      Medication changes:  1) continue current treatment plan    Non-pharmacological treatments  1) Continue with group therapy, milieu therapy and occupational therapy  Safety  1) Safety/communication plan established targeting dynamic risk factors above      2) Risks, benefits, and possible side effects of medications explained to patient and patient verbalizes understanding  Counseling / Coordination of Care    Total floor / unit time spent today 20 minutes  Greater than 50% of total time was spent with the patient and / or family counseling and / or coordination of care  A description of the counseling / coordination of care  Patient's Rights, confidentiality and exceptions to confidentiality, use of automated medical record, Jeb Patrick staff access to medical record, and consent to treatment reviewed      Gustavo Mcnulty PA-C

## 2020-05-09 NOTE — PLAN OF CARE
Problem: Alteration in Thoughts and Perception  Goal: Agree to be compliant with medication regime, as prescribed and report medication side effects  Description  Interventions:  - Offer appropriate PRN medication and supervise ingestion; conduct aims, as needed   Outcome: Progressing     Problem: Alteration in Thoughts and Perception  Goal: Attend and participate in unit activities, including therapeutic, recreational, and educational groups  Description  Interventions:  - Provide therapeutic and educational activities daily, encourage attendance and participation, and document same in the medical record     CERTIFIED PEER SPECIALIST INTERVENTIONS:    Complete peer assessment with patient to assess their needs and identify their goals to complete while in the recovery program as well as once discharged into the community  Patient will complete WRAP Plan, Crisis Plan and 5 Life Domains  Patient will attend 50% of groups offered on the unit  Patient will complete a goal card weekly  Outcome: Not Progressing  Goal: Complete daily ADLs, including personal hygiene independently, as able  Description  Interventions:  - Observe, teach, and assist patient with ADLS  - Monitor and promote a balance of rest/activity, with adequate nutrition and elimination   Outcome: Not Progressing     Problem: Depression  Goal: Refrain from isolation  Description  Interventions:  - Develop a trusting relationship   - Encourage socialization   Outcome: Not Progressing     [address] continues to isolate in room asleep in bed, or sits out in phone chair in [place] when phone not in use  She is pleasant, says pleased w/her new room despite it being "smaller" as finds it "more private"  She came out for supper medicine  Refused meal as found it too hard to chew; did drink her Ensure  She is disheveled, but as yet unwilling to attend to hygiene  Has not responded to invitations to optional movie activity or PM Group

## 2020-05-09 NOTE — PLAN OF CARE
Problem: Alteration in Thoughts and Perception  Goal: Agree to be compliant with medication regime, as prescribed and report medication side effects  Description  Interventions:  - Offer appropriate PRN medication and supervise ingestion; conduct aims, as needed   Outcome: Progressing  Goal: Attend and participate in unit activities, including therapeutic, recreational, and educational groups  Description  Interventions:  - Provide therapeutic and educational activities daily, encourage attendance and participation, and document same in the medical record     CERTIFIED PEER SPECIALIST INTERVENTIONS:    Complete peer assessment with patient to assess their needs and identify their goals to complete while in the recovery program as well as once discharged into the community  Patient will complete WRAP Plan, Crisis Plan and 5 Life Domains  Patient will attend 50% of groups offered on the unit  Patient will complete a goal card weekly  Outcome: Not Progressing  Goal: Complete daily ADLs, including personal hygiene independently, as able  Description  Interventions:  - Observe, teach, and assist patient with ADLS  - Monitor and promote a balance of rest/activity, with adequate nutrition and elimination   Outcome: Not Progressing     Problem: Risk for Self Injury/Neglect  Goal: Complete daily ADLs, including personal hygiene independently, as able  Description  Interventions:  - Observe, teach, and assist patient with ADLS  - Monitor and promote a balance of rest/activity, with adequate nutrition and elimination  Outcome: Not Julian Vivar has been isolative to her room for the majority of the shift, only leaving her room for her scheduled medications, which she was compliant with as well as meals which she ate 50% of both meals  No groups attended, verbalized feeling tired, but denies feeling anxious but sad, not depressed at this time of documentation  Behaviors controlled   Will continue to monitor

## 2020-05-09 NOTE — PROGRESS NOTES
Maggie maintained on ongoing fall and SAFE precaution  Jason Bottom in bed with eyes closed, breath even and unlabored   On O2 with humidifier @1L/m via nasal cannula  Continues rounding implemented   No somatic complaint overnight  No PRN needed for sleep aid   No indication of pain or discomfort   In no distress and is adjusting well to room changes   Will continue to monitor

## 2020-05-09 NOTE — PROGRESS NOTES
[address] J[address]reet did her Incentive Spirometry, but, performance was poor, achieving only 1000ml volumes  She surmises was out of breath from coming out of bathroom, hadn't rested enough yet before began spirometry  She did come out on own for HS medicine (except the Singulair)  Did have an HS snack  Did an better job tonight of making needs known instead of expecting the nurse to intuit needs, make assumptions  Wearing now her QHS humidified nasal O2 @ 1L for bed

## 2020-05-09 NOTE — PLAN OF CARE
Problem: Alteration in Thoughts and Perception  Goal: Agree to be compliant with medication regime, as prescribed and report medication side effects  Description  Interventions:  - Offer appropriate PRN medication and supervise ingestion; conduct aims, as needed   Outcome: Progressing     Problem: Alteration in Thoughts and Perception  Goal: Attend and participate in unit activities, including therapeutic, recreational, and educational groups  Description  Interventions:  - Provide therapeutic and educational activities daily, encourage attendance and participation, and document same in the medical record     CERTIFIED PEER SPECIALIST INTERVENTIONS:    Complete peer assessment with patient to assess their needs and identify their goals to complete while in the recovery program as well as once discharged into the community  Patient will complete WRAP Plan, Crisis Plan and 5 Life Domains  Patient will attend 50% of groups offered on the unit  Patient will complete a goal card weekly  Outcome: Not Progressing  Goal: Complete daily ADLs, including personal hygiene independently, as able  Description  Interventions:  - Observe, teach, and assist patient with ADLS  - Monitor and promote a balance of rest/activity, with adequate nutrition and elimination   Outcome: Not Progressing     Problem: Depression  Goal: Refrain from isolation  Description  Interventions:  - Develop a trusting relationship   - Encourage socialization   Outcome: Not Progressing     Problem: Nutrition/Hydration-ADULT  Goal: Nutrient/Hydration intake appropriate for improving, restoring or maintaining nutritional needs  Description  Monitor and assess patient's nutrition/hydration status for malnutrition  Collaborate with interdisciplinary team and initiate plan and interventions as ordered  Monitor patient's weight and dietary intake as ordered or per policy  Utilize nutrition screening tool and intervene as necessary   Determine patient's food preferences and provide high-protein, high-caloric foods as appropriate  INTERVENTIONS:  - Monitor oral intake, urinary output, labs, and treatment plans  - Assess nutrition and hydration status and recommend course of action  - Evaluate amount of meals eaten  - Assist patient with eating if necessary   - Allow adequate time for meals  - Recommend/ encourage appropriate diets, oral nutritional supplements, and vitamin/mineral supplements  - Order, calculate, and assess calorie counts as needed  - Recommend, monitor, and adjust tube feedings and TPN/PPN based on assessed needs  - Assess need for intravenous fluids  - Provide specific nutrition/hydration education as appropriate  - Include patient/family/caregiver in decisions related to nutrition  Outcome: Not Abelardo Shepherd is low energy, poorly motivated; lays crumpled in bed asleep  Did come out for supper medicine  Refused the meal, but, did drink the Ensure  She is disheveled, unkempt, no shower in 5 days & unmotivated to address hygiene  Has not responded to invitation to optional [employer] group or PM Group  Has not followed through w/moving aside bag & 2 pair shoes that pose an obstacle to her getting in/out of bed easily, also, block the 11-7 compressor/O2 tubing from reaching her freely  Was spoken to about this last evening & again this evening  "It's okay" & rolls over in bed

## 2020-05-10 NOTE — PLAN OF CARE
Problem: Alteration in Thoughts and Perception  Goal: Agree to be compliant with medication regime, as prescribed and report medication side effects  Description  Interventions:  - Offer appropriate PRN medication and supervise ingestion; conduct aims, as needed   Outcome: Progressing     Problem: Alteration in Thoughts and Perception  Goal: Attend and participate in unit activities, including therapeutic, recreational, and educational groups  Description  Interventions:  - Provide therapeutic and educational activities daily, encourage attendance and participation, and document same in the medical record     CERTIFIED PEER SPECIALIST INTERVENTIONS:    Complete peer assessment with patient to assess their needs and identify their goals to complete while in the recovery program as well as once discharged into the community  Patient will complete WRAP Plan, Crisis Plan and 5 Life Domains  Patient will attend 50% of groups offered on the unit  Patient will complete a goal card weekly  Outcome: Not Progressing  Goal: Complete daily ADLs, including personal hygiene independently, as able  Description  Interventions:  - Observe, teach, and assist patient with ADLS  - Monitor and promote a balance of rest/activity, with adequate nutrition and elimination   Outcome: Not Progressing     Problem: Depression  Goal: Refrain from isolation  Description  Interventions:  - Develop a trusting relationship   - Encourage socialization   Outcome: Not Progressing     Problem: Nutrition/Hydration-ADULT  Goal: Nutrient/Hydration intake appropriate for improving, restoring or maintaining nutritional needs  Description  Monitor and assess patient's nutrition/hydration status for malnutrition  Collaborate with interdisciplinary team and initiate plan and interventions as ordered  Monitor patient's weight and dietary intake as ordered or per policy  Utilize nutrition screening tool and intervene as necessary   Determine patient's food preferences and provide high-protein, high-caloric foods as appropriate  INTERVENTIONS:  - Monitor oral intake, urinary output, labs, and treatment plans  - Assess nutrition and hydration status and recommend course of action  - Evaluate amount of meals eaten  - Assist patient with eating if necessary   - Allow adequate time for meals  - Recommend/ encourage appropriate diets, oral nutritional supplements, and vitamin/mineral supplements  - Order, calculate, and assess calorie counts as needed  - Recommend, monitor, and adjust tube feedings and TPN/PPN based on assessed needs  - Assess need for intravenous fluids  - Provide specific nutrition/hydration education as appropriate  - Include patient/family/caregiver in decisions related to nutrition  Outcome: Jannette Julio Castro continues to isolate in room in bed most of time  Has come out for supper medicine & meal  Refused most of the meal (ate only 10%, applesauce) saying she was "too tired" to eat it, but, drank the Ensure  Continues unkempt; no motivation to attend to hygiene, now 5 days since last showered  She has not responded to invitations to optional [employer] group or optional movie activity  She is pleasant

## 2020-05-10 NOTE — PROGRESS NOTES
Sleeping with no distress noted  O2 on at 1L/NC   Safe and fall precautions in place, montiroing continues

## 2020-05-10 NOTE — PROGRESS NOTES
2145  Sania Higginbotham was awakened by this nurse for HS medicine; she refused any prn laxatives offered; "I haven't been eating that much " She had HS snack  Wearing now her QHS humidified nasal O2 @ 1L for bed

## 2020-05-10 NOTE — PLAN OF CARE
Problem: Alteration in Thoughts and Perception  Goal: Agree to be compliant with medication regime, as prescribed and report medication side effects  Description  Interventions:  - Offer appropriate PRN medication and supervise ingestion; conduct aims, as needed   Outcome: Progressing  Goal: Attend and participate in unit activities, including therapeutic, recreational, and educational groups  Description  Interventions:  - Provide therapeutic and educational activities daily, encourage attendance and participation, and document same in the medical record     CERTIFIED PEER SPECIALIST INTERVENTIONS:    Complete peer assessment with patient to assess their needs and identify their goals to complete while in the recovery program as well as once discharged into the community  Patient will complete WRAP Plan, Crisis Plan and 5 Life Domains  Patient will attend 50% of groups offered on the unit  Patient will complete a goal card weekly  Outcome: Not Progressing  Goal: Complete daily ADLs, including personal hygiene independently, as able  Description  Interventions:  - Observe, teach, and assist patient with ADLS  - Monitor and promote a balance of rest/activity, with adequate nutrition and elimination   Outcome: Not Donavon Scott has been isolative to her room for the majority of the shift, only leaving her room for her scheduled medications, which she was compliant with as well as meals which she ate 50% of breakfast but declined lunch  Declined prn related to her last BM being on 5/6, declined feeling discomfort at this time of documentation  No groups attended, verbalized feeling tired, but denies feeling anxious but sad, not depressed at this time of documentation  Behaviors controlled  Will continue to monitor

## 2020-05-10 NOTE — PROGRESS NOTES
Psychiatry Progress Note    Subjective: Interval History     Patient intermittently visible on the unit this morning but withdrawn to herself  Patient continues to be somatically preoccupied  Today reporting that she continues to have episodes of increased urination  Patient reports that she has already discussed this with her attending psychiatrist   P o  Intake remains fair still only eating approximately 50% of her meals  Primarily isolative to her room during the day continues to be encouraged to participate in unit programming      Behavior over the last 24 hours:  unchanged  Sleep: normal  Appetite: Fair  Medication side effects: No  ROS: no complaints    Current medications:    Current Facility-Administered Medications:     acetaminophen (TYLENOL) tablet 325 mg, 325 mg, Oral, Q6H PRN, Tree Madden MD    acetaminophen (TYLENOL) tablet 650 mg, 650 mg, Oral, Q6H PRN, Tree Madden MD    acetaminophen (TYLENOL) tablet 650 mg, 650 mg, Oral, Q8H PRN, Tree Madden MD    albuterol (PROVENTIL HFA,VENTOLIN HFA) inhaler 2 puff, 2 puff, Inhalation, Q4H PRN, Tree Madden MD, 2 puff at 10/11/19 0424    aluminum-magnesium hydroxide-simethicone (MYLANTA) 200-200-20 mg/5 mL oral suspension 15 mL, 15 mL, Oral, Q4H PRN, Tree Madden MD, 15 mL at 01/26/20 1021    ammonium lactate (LAC-HYDRIN) 12 % lotion 1 application, 1 application, Topical, BID PRN, Tree Madden MD    benzonatate (TESSALON PERLES) capsule 100 mg, 100 mg, Oral, TID PRN, Tree Madden MD    benztropine (COGENTIN) injection 1 mg, 1 mg, Intramuscular, Q8H PRN, Tree Madden MD    carbamide peroxide [facility]) 6 5 % otic solution 5 drop, 5 drop, Left Ear, BID PRN, Tree Madden MD, 5 drop at 04/15/20 2128    cloZAPine (CLOZARIL) tablet 25 mg, 25 mg, Oral, BID, Tree Madden MD, 25 mg at 05/09/20 2112    cloZAPine (CLOZARIL) tablet 50 mg, 50 mg, Oral, BID, Tree Madden MD, 50 mg at 05/09/20 2112    docusate sodium (COLACE) capsule 100 mg, 100 mg, Oral, BID PRN, Isidoro Gonzalez MD    EPINEPHrine PF (ADRENALIN) 1 mg/mL injection 0 15 mg, 0 15 mg, Intramuscular, Once PRN, Isidoro Gonzalez MD    fluticasone-vilanterol (BREO ELLIPTA) 200-25 MCG/INH inhaler 1 puff, 1 puff, Inhalation, Daily, Isidoro Gonzalez MD, 1 puff at 05/09/20 0826    ketotifen (ZADITOR) 0 025 % ophthalmic solution 1 drop, 1 drop, Right Eye, BID PRN, Isidoro Gonzalez MD    levothyroxine tablet 125 mcg, 125 mcg, Oral, Early Morning, Isidoro Gonzalez MD, 125 mcg at 05/10/20 0559    magnesium hydroxide (MILK OF MAGNESIA) 400 mg/5 mL oral suspension 30 mL, 30 mL, Oral, Daily PRN, Isidoro Gonzalez MD    montelukast (SINGULAIR) tablet 10 mg, 10 mg, Oral, HS, Isidoro Gonzalez MD, 10 mg at 02/22/20 2104    OLANZapine (ZyPREXA) IM injection 5 mg, 5 mg, Intramuscular, Q8H PRN, Isidoro Gonzalez MD    OLANZapine (ZyPREXA) tablet 5 mg, 5 mg, Oral, Q8H PRN, Isidoro Gonzalez MD    ondansetron (ZOFRAN-ODT) dispersible tablet 4 mg, 4 mg, Oral, Q6H PRN, Isidoro Gonzalez MD, 4 mg at 12/09/19 1757    pantoprazole (PROTONIX) EC tablet 40 mg, 40 mg, Oral, Early Morning, Isidoro Gonzalez MD, 40 mg at 05/10/20 0559    polyethylene glycol (MIRALAX) packet 17 g, 17 g, Oral, Daily PRN, Isidoro Gonzalez MD    polyvinyl alcohol (LIQUIFILM TEARS) 1 4 % ophthalmic solution 1 drop, 1 drop, Both Eyes, Q3H PRN, Isidoro Gonzalez MD    sertraline (ZOLOFT) tablet 175 mg, 175 mg, Oral, Daily, Isidoro Gonzalez MD, 175 mg at 05/10/20 3332    sucralfate (CARAFATE) oral suspension 1,000 mg, 1,000 mg, Oral, BID, Isidoro Gonzalez MD, 1,000 mg at 05/10/20 0600    theophylline (JEF-24) 24 hr capsule 200 mg, 200 mg, Oral, Daily, Isidoro Gonzalez MD, 200 mg at 05/10/20 0834    tiotropium (SPIRIVA) capsule for inhaler 18 mcg, 18 mcg, Inhalation, Daily, Isidoro Gonzalez MD, 18 mcg at 05/09/20 0826    traZODone (DESYREL) tablet 25 mg, 25 mg, Oral, HS PRN, Isidoro Gonzalez MD    Current Problem List:    Patient Active Problem List   Diagnosis    COPD with asthma ([facility] )    Tobacco use disorder, continuous  Compression fracture of L4 lumbar vertebra    Ventral hernia    Acute on chronic respiratory failure with hypoxia (HCC)    Schizoaffective disorder, bipolar type (HCC)    Acquired hypothyroidism    Gastroesophageal reflux disease without esophagitis    Abnormal CT of the chest    Excessive cerumen in left ear canal    Lipoma of right upper extremity    Noncompliant with deep vein thrombosis (DVT) prophylaxis    At risk for aspiration    Ear ache    Tachycardia    Chest pain    Low HDL (under 40)       Problem list reviewed 05/10/20     Objective:     Vital Signs:  Vitals:    05/08/20 2000 05/09/20 0520 05/09/20 0700 05/09/20 2000   BP: 96/56 102/52 104/65    BP Location: Left arm Left arm Left arm    Pulse: 76 73 78 75   Resp: 14  18 16   Temp: (!) 97 3 °F (36 3 °C)  98 °F (36 7 °C) (!) 96 8 °F (36 °C)   TempSrc: Temporal  Temporal Temporal   SpO2: 90% 96%  90%   Weight:       Height:             Appearance:  age appropriate, casually dressed and disheveled   Behavior:  normal   Speech:  normal volume   Mood:  anxious   Affect:  constricted   Thought Process:  normal   Thought Content:  delusions  somatic   Perceptual Disturbances: None   Risk Potential: none   Sensorium:  person, place and time   Cognition:  intact   Consciousness:  alert and awake    Attention: attention span and concentration were age appropriate   Intellect: average   Insight:  poor   Judgment: poor      Motor Activity: no abnormal movements       I/O Past 24 hours:  No intake/output data recorded  No intake/output data recorded  Labs:  Reviewed 05/10/20    Progress Toward Goals:  unchanged    Assessment / Plan:     Schizoaffective disorder, bipolar type ([facility] )    Recommended Treatment:      Medication changes:  1) continue current treatment plan    Non-pharmacological treatments  1) Continue with group therapy, milieu therapy and occupational therapy      Safety  1) Safety/communication plan established targeting dynamic risk factors above  2) Risks, benefits, and possible side effects of medications explained to patient and patient verbalizes understanding  Counseling / Coordination of Care    Total floor / unit time spent today 20 minutes  Greater than 50% of total time was spent with the patient and / or family counseling and / or coordination of care  A description of the counseling / coordination of care  Patient's Rights, confidentiality and exceptions to confidentiality, use of automated medical record, [facility] Tacho Patrick staff access to medical record, and consent to treatment reviewed      Samantha Acharya PA-C

## 2020-05-11 NOTE — PROGRESS NOTES
Psychiatry Progress Note [address] 60 y o  female MRN: [id number]  Unit/Bed#: [facility] 111-01 Encounter: [id number]  Code Status: Level 1 - Full Code    PCP: Deeanna Apley, PA-C    Date of Admission:  7/23/2019 1730   Date of Service:  05/11/20  Patient Active Problem List   Diagnosis    COPD with asthma ([facility] )    Tobacco use disorder, continuous    Compression fracture of L4 lumbar vertebra    Ventral hernia    Acute on chronic respiratory failure with hypoxia ([facility] )    Schizoaffective disorder, bipolar type ([facility] )    Acquired hypothyroidism    Gastroesophageal reflux disease without esophagitis    Abnormal CT of the chest    Excessive cerumen in left ear canal    Lipoma of right upper extremity    Noncompliant with deep vein thrombosis (DVT) prophylaxis    At risk for aspiration    Ear ache    Tachycardia    Chest pain    Low HDL (under 40)     Diagnosis schizoaffective bipolar  Assessment   Overall Status: still psychosomatic, no shower sin last 6 days in a row, claims to be tired and sad and anxious,   Certification Statement to demonstrate a period of stability by attending to ADLs and becoming less psychosomatic in attending more groups Acceptance by patient:  Accepting  Alberteen December in recovery:  Living at another personal care home   Understanding of medications: Yes    Involved in reintegration process: On hold due to [address] in relationship with psychiatrist:  Trusting sometimes    Medication changes    None today    Non-pharmacological treatments   Check urine for any UTI infection and also clozapine level   Continue with individual, group, milieu and occupational therapy using recovery principles and psycho-education about accepting illness and the need for treatment   Encourage to take showers twice a week and cooperate with treatment and adl skills as per her behav   Plan   Therapeutic passes on hold due to covid 19 lock down   Prepare for the [facility] and encouraged to accept  rather than refuse it   Reminded to attend at least 25% of groups on a daily basis including [facility]    Safety   Safety and communication plan established to target dynamic risk factors discussed above  Discharge Plan   · back to a [facility] [place]    Interval Progress   No showers in last 6 days in a row, disheveled and unkept, with uncombed hair, not attending groups despite promises, still suspicious of  motives of staff and examines his pills before taking them  Still accusing staff of tampering with her spirometer and oxygen concentrator  Now complains of urinating a lot and hence will order urine analysis  She tells me she will try to take a shower today and attend more groups and she was told we will try to send her back to the [address] [place] [address] provided she attends to [facility]  Sleep:   Fair  Appetite:   Fair but she picks and chooses her food and now asking to see a dietitian  Compliance with medications:  Good  Side effects:   none but claims to feel tired sometimes  Review of systems:  Continues to have psychosomatic symptoms and with increased frequency of urination    Mental Status Exam  Appearance:  Found sitting in the dining [place] wearing her facial mask   Looks older than her stated age, poorly  groomed and not very well kept, with uncombed hairt but  wearing clean clothing   Anxious preoccupied  Has good eye contact   Behavior:  Superficially friendly pleasant but anxious suspicious  Speech:  tangential at times with tendency to become stilted   Mood:   anxious irritated times,    Affect: increased in intensity range mood congruent redirectable  Thought Process:  Circumstantial with tendency to have stilted speech   Thought Content:  Admits to some paranoia about motives of staff switching her medications or tampering with her inhalant or her oxygen concentrator off and on  She also has to examine her pills literally before taking them    No preoccupation with violence or suicide  No current suicidal homicidal thoughts intent or plans reported  No phobias but has obsessions and compulsions about examining her pills before she takes them  Jl Heredia Psychosomatic about dying by choking from food and from heart attack or from shortness of breath and now from catching Covid-19 which are all ongoing believes that she has  Perceptual Disturbances:  None reported not appearing responding to internal stimuli   Risk Potential:  Ability to care for herself and refusal to take showers  Sensorium: fully oriented to place, person, time, date, day, month, year and to situation  Cognition:  Grossly intact, aware of current events like the corona virus situation, recent and remote memory intact    No language deficit  Consciousness:  Alert and awake  Attention and concentration:  fair  Intellect:  average  Insight:  limited  Judgment:  fair  Motor Activity: No abnormal involuntary movement noted today    Vitals  Temp:  [96 8 °F (36 °C)] 96 8 °F (36 °C)  HR:  [71] 71  Resp:  [14] 14  BP: (93)/(56) 93/56  SpO2:  [94 %] 94 %  No intake or output data in the 24 hours ending 05/11/20 0907    Lab Results: No New Labs Available For Today  Results from last 7 days   Lab Units 05/08/20  0609   WBC Thousand/uL 7 40   RBC Million/uL 4 71   HEMOGLOBIN g/dL 14 5   HEMATOCRIT % 43 6   MCV fL 93   PLATELETS Thousands/uL 209   NEUTROS ABS Thousands/µL 3 70         Current Facility-Administered Medications:  acetaminophen 325 mg Oral Q6H PRN Chichi Lockett MD   acetaminophen 650 mg Oral Q6H PRN Chichi Lockett MD   acetaminophen 650 mg Oral Q8H PRN Chichi Lockett MD   albuterol 2 puff Inhalation Q4H PRN Chichi Lockett MD   aluminum-magnesium hydroxide-simethicone 15 mL Oral Q4H PRN Chichi Lockett MD   ammonium lactate 1 application Topical BID PRBJORN Lockett MD   benzonatate 100 mg Oral TID PRN Chichi Lockett MD   benztropine 1 mg Intramuscular Q8H PRN Chichi Lockett MD   carbamide peroxide 5 drop Left Ear BID PRN Mynor Terry MD   cloZAPine 25 mg Oral BID Mynor Terry MD   cloZAPine 50 mg Oral BID Mynor Terry MD   docusate sodium 100 mg Oral BID PRN Mynor Terry MD   EPINEPHrine PF 0 15 mg Intramuscular Once PRN Mynor Terry MD   fluticasone-vilanterol 1 puff Inhalation Daily Mynor Terry MD   ketotifen 1 drop Right Eye BID PRN Mynor Terry MD   levothyroxine 125 mcg Oral Early Morning Mynor Terry MD   magnesium hydroxide 30 mL Oral Daily PRN Mynor Terry MD   montelukast 10 mg Oral HS Mynor Terry MD   OLANZapine 5 mg Intramuscular Q8H PRN Mynor Terry MD   OLANZapine 5 mg Oral Q8H PRN Mynor Terry MD   ondansetron 4 mg Oral Q6H PRN Mynor Terry MD   pantoprazole 40 mg Oral Early Morning Mynor Terry MD   polyethylene glycol 17 g Oral Daily PRN Mynor Terry MD   polyvinyl alcohol 1 drop Both Eyes Q3H PRN Mynor Terry MD   sertraline 175 mg Oral Daily Mynor Terry MD   sucralfate 1,000 mg Oral BID Mynor Terry MD   theophylline 200 mg Oral Daily Mynor Terry MD   tiotropium 18 mcg Inhalation Daily Mynor Terry MD   traZODone 25 mg Oral HS PRN Mynor Terry MD       Counseling / Coordination of Care: Total floor / unit time spent today 15 minutes  Greater than 50% of total time was spent with the patient and / or family counseling and / or somewhat receptive to supportive listening and teaching positive coping skills to deal with symptom mangement  Patient's Rights, confidentiality and exceptions to confidentiality, use of automated medical record, [facility] Patrick staff access to medical record, and consent to treatment reviewed

## 2020-05-11 NOTE — PROGRESS NOTES
05/11/20 0852   Team Meeting   Meeting Type Daily Rounds   Team Members Present   Team Members Present Physician;Nurse;; Other (Discipline and Name)   Physician Team Member Dr Paty Horta RN   Care Management Team Member Ebony Dodge   Other (Discipline and Name) Caity Sequeira, [facility] [address]        05/11/20 8423   Team Meeting   Meeting Type Daily Rounds   Team Members Present   Team Members Present Physician;Nurse;; Other (Discipline and Name)   Physician Team Member Dr Sudhir Sheffield Team Member Dmitriy Blas RN   Care Management Team Member Ebony Dodge   Other (Discipline and Name) Caity Sequeira, SHANIKA     5/5 last shower  Poor food intake

## 2020-05-11 NOTE — PROGRESS NOTES
2145 Maggie did not attend PM Group  Poor performance doing the Incentive Spirometry after the first few breaths, which registered 1250ml volume, but, the remainder barely reached 1000ml  Pointed out to her that laying about in bed breeds progressive weakness, loss of stamina as shown by her volume  Did acknowledge  Came out for HS snack & HS medicine except the Singulair  Wearing now her QHS humidified nasal O2 @ 1L for bed

## 2020-05-11 NOTE — PLAN OF CARE
Problem: Alteration in Thoughts and Perception  Goal: Verbalize thoughts and feelings  Description  Interventions:  - Promote a nonjudgmental and trusting relationship with the patient through active listening and therapeutic communication  - Assess patient's level of functioning, behavior and potential for risk  - Engage patient in 1 on 1 interactions for a minimum of 15 minutes each session  - Encourage patient to express fears, feelings, frustrations, and discuss symptoms    - [place] patient to reality, help patient recognize reality-based thinking   - Administer medications as ordered and assess for potential side effects  - Provide the patient education related to the signs and symptoms of the illness and desired effects of prescribed medications  Outcome: Progressing  Goal: Agree to be compliant with medication regime, as prescribed and report medication side effects  Description  Interventions:  - Offer appropriate PRN medication and supervise ingestion; conduct aims, as needed   Outcome: Progressing     Problem: Alteration in Thoughts and Perception  Goal: Attend and participate in unit activities, including therapeutic, recreational, and educational groups  Description  Interventions:  - Provide therapeutic and educational activities daily, encourage attendance and participation, and document same in the medical record     CERTIFIED PEER SPECIALIST INTERVENTIONS:    Complete peer assessment with patient to assess their needs and identify their goals to complete while in the recovery program as well as once discharged into the community  Patient will complete WRAP Plan, Crisis Plan and 5 Life Domains  Patient will attend 50% of groups offered on the unit  Patient will complete a goal card weekly      Outcome: Not Progressing  Goal: Complete daily ADLs, including personal hygiene independently, as able  Description  Interventions:  - Observe, teach, and assist patient with ADLS  - Monitor and promote a balance of rest/activity, with adequate nutrition and elimination   Outcome: Not Progressing     Problem: Depression  Goal: Refrain from isolation  Description  Interventions:  - Develop a trusting relationship   - Encourage socialization   Outcome: Not Rajiv Pandya continues to isolate in her room crumpled in bed asleep  She has not addressed hygiene; no shower in 5days & no interest in remedying  She is preoccupied w/her UA & what the results may portend about her health  Continues to cling to role of invalid  Came out for supper medicine  Ate 10% of meal (1/2 ice cream) & had Ensure  Irritable, put out when male peer asked to use the phone when she was sitting in phone chair  Reluctant to move citing that he had been on phone several times  Pointed out to her that she is not using phone @ all, must make way for those who wish to use phone, not her say how many calls someone makes  Has not responded to invitation to multi choice entertainment group

## 2020-05-11 NOTE — TREATMENT TEAM
05/11/20 1100   Activity/Group Checklist   Group   (IMR/Maslow's Hierarchy )   Attendance Did not attend   Attendance Duration (min) 46-60   Affect/Mood IRMA

## 2020-05-11 NOTE — TREATMENT TEAM
05/11/20 0900   Activity/Group Checklist   Group Community meeting   Attendance Did not attend   Attendance Duration (min) 31-45   Affect/Mood IRMA

## 2020-05-11 NOTE — PLAN OF CARE
Problem: Alteration in Thoughts and Perception  Goal: Verbalize thoughts and feelings  Description  Interventions:  - Promote a nonjudgmental and trusting relationship with the patient through active listening and therapeutic communication  - Assess patient's level of functioning, behavior and potential for risk  - Engage patient in 1 on 1 interactions for a minimum of 15 minutes each session  - Encourage patient to express fears, feelings, frustrations, and discuss symptoms    - [place] patient to reality, help patient recognize reality-based thinking   - Administer medications as ordered and assess for potential side effects  - Provide the patient education related to the signs and symptoms of the illness and desired effects of prescribed medications  Outcome: Not Progressing  Goal: Agree to be compliant with medication regime, as prescribed and report medication side effects  Description  Interventions:  - Offer appropriate PRN medication and supervise ingestion; conduct aims, as needed   Outcome: Progressing  Goal: Attend and participate in unit activities, including therapeutic, recreational, and educational groups  Description  Interventions:  - Provide therapeutic and educational activities daily, encourage attendance and participation, and document same in the medical record     CERTIFIED PEER SPECIALIST INTERVENTIONS:    Complete peer assessment with patient to assess their needs and identify their goals to complete while in the recovery program as well as once discharged into the community  Patient will complete WRAP Plan, Crisis Plan and 5 Life Domains  Patient will attend 50% of groups offered on the unit  Patient will complete a goal card weekly  Outcome: Not Parul Saravia has been isolative to her room, only coming out for her scheduled medications and breakfast; skipped lunch  Appetite poor, 25% breakfast and skipped lunch   Made aware that when she voids that is to be collected  Sample collected @ 1340  Results pending  Attended no groups  Appears disheveled in appearance and is somatically preoccupied during interactions with this writer  Behaviors controlled  Will continue to monitor

## 2020-05-11 NOTE — PLAN OF CARE

## 2020-05-12 NOTE — TREATMENT TEAM
05/12/20 0900   Activity/Group Checklist   Group Community meeting   Attendance Did not attend   Attendance Duration (min) 16-30   Affect/Mood IRMA

## 2020-05-12 NOTE — TREATMENT TEAM
05/12/20 1100   Activity/Group Checklist   Group   (IMR/Personal Growth )   Attendance Did not attend   Attendance Duration (min) 46-60   Affect/Mood IRMA

## 2020-05-12 NOTE — PROGRESS NOTES
~Maggie maintained on ongoing fall and SAFE precaution   Laying in bed with eyes closed, breath even and unlabored   On O2 with humidifier @1L/m via nasal cannula  Continues rounding implemented   No somatic complaint overnight  No PRN needed for sleep aid   No indication of pain or discomfort  Will continue to monitor    ~Maggie has a schedules lab Clozapine level to be obtain in the morning

## 2020-05-12 NOTE — TREATMENT TEAM
05/12/20 1400   Activity/Group Checklist   Group   (Recovery Workshop )   Attendance Did not attend   Attendance Duration (min) 46-60   Affect/Mood IRMA

## 2020-05-12 NOTE — PROGRESS NOTES
[facility] did do the Incentive Spirometry achieving mostly 1250ml volume, some 1100ml  She got an HS snack, began to eat it in DR, but, came out to phone chair in [place]  When asked why has open food out in the [place], responded, "There's no one to watch me " Afraid might choke if ate in DR without direct supervision to save her  Reminded of unit rules  Elected to throw out remainder of snack  "My gums are sore anyway " Did take her HS medicine except the Singulair  Wearing now her QHS humidified nasal O2 @ 1L for bed

## 2020-05-12 NOTE — PROGRESS NOTES
Psychiatry Progress Note [address] 60 y o  female MRN: [id number]  Unit/Bed#: MARCIO ADAIR [facility] 111-01 Encounter: [id number]  Code Status: Level 1 - Full Code    PCP: Reggie Berry PA-C    Date of Admission:  7/23/2019 7167   Date of Service:  05/12/20  Patient Active Problem List   Diagnosis    COPD with asthma ([facility] [facility] )    Tobacco use disorder, continuous    Compression fracture of L4 lumbar vertebra    Ventral hernia    Acute on chronic respiratory failure with hypoxia ([facility] [facility] )    [facility], [facility]    Acquired hypothyroidism    Gastroesophageal reflux disease without esophagitis    Abnormal CT of the chest    Excessive cerumen in left ear canal    Lipoma of right upper extremity    Noncompliant with deep vein thrombosis (DVT) prophylaxis    At risk for aspiration    Ear ache    Tachycardia    Chest pain    Low HDL (under 40)     Diagnosis schizoaffective bipolarAssessment   Overall Status:  No showers since 7 days ago Still preoccupied disheveled poorly kept focused on psychosomatic symptoms and not attending all groups   Certification Statement to demonstrate a period of stability by attending to ADLs and becoming less psychosomatic in attending more groups Acceptance by patient:  Accepting  Nadeem Gamino in recovery:  Living at another personal care home   Understanding of medications: Yes    Involved in reintegration process: On hold due to [address] in relationship with psychiatrist:  Trusting sometimes    Medication changes    None today    Non-pharmacological treatments   Send urine for culture and sensitivity because urinalysis showed WBCs and few bacteria   Continue with individual, group, milieu and occupational therapy using recovery principles and psycho-education about accepting illness and the need for treatment     Encourage to take showers twice a week and cooperate with treatment and adl skills as per her behav  Plan   Therapeutic passes on hold due to covid 19 lock down   Prepare for the [facility] and encouraged to accept  rather than refuse it   Reminded to attend at least 25% of groups on a daily basis including [facility]    Safety   Safety and communication plan established to target dynamic risk factors discussed above  Discharge Plan   · back to a [facility]    Interval Progress   Patient has not taken a shower in the last 7 days in a row appearing disheveled poorly kept with uncombed hair  She prefers to lay in bed or sits by the telephone area insisting that staff watch her as she is afraid of choking  She also things that she is going to die from some terrible illness and is preoccupied about the results of the urine analysis done yesterday  She comes up with multiple excuses for not taking showers or going to groups and is still suspicious of staff and examines her pills before she takes them and questions if her oxygen concentrator or spirometer is being tampered with by staff  She understands she has no place other than go back to [facility] and was reminded again to force herself to attend groups including the minimum required groups to be able for discharge  Somewhat receptive reassurance and support she refused to come for team meeting today  Not too receptive when asked to get up and go to groups when reminded that she has psychosomatic symptoms and there is no real physical problems at all make her feel too tired    She was told that will check her urine for culture     Sleep:   Fair  Appetite:   Fair but she picks and chooses her food and now asking to see a dietitian  Compliance with medications:  Good  Side effects:   none but claims to feel tired sometimes  Review of systems:  Continues to have psychosomatic symptoms and with increased frequency of urination    Mental Status Exam  Appearance:  Found laying on bed in her room claiming she feels too tired which is her usual complaint   Looks older than her stated age, poorly  groomed and not very well kept, with uncombed hairt but  wearing clean clothing   Anxious preoccupied  Has good eye contact   Behavior:  Superficially friendly pleasant but anxious suspicious  Speech:  tangential at times with tendency to become stilted   Mood:   anxious irritated times,    Affect: increased in intensity range mood congruent redirectable  Thought Process:  Circumstantial with tendency to have stilted speech   Thought Content:  Admits to some paranoia about motives of staff switching her medications or tampering with her inhalant or her oxygen concentrator off and on  She also has to examine her pills literally before taking them  No preoccupation with violence or suicide  No current suicidal homicidal thoughts intent or plans reported  No phobias but has obsessions and compulsions about examining her pills before she takes them  Karrie Nissen Psychosomatic about dying by choking from food and from heart attack or from shortness of breath and now from catching Covid-19 which are all ongoing believes that she has  Perceptual Disturbances:  None reported not appearing responding to internal stimuli   Risk Potential:  Ability to care for herself and refusal to take showers  Sensorium: fully oriented to place, person, time, date, day, month, year and to situation  Cognition:  Grossly intact, aware of current events like the corona virus situation, recent and remote memory intact    No language deficit  Consciousness:  Alert and awake  Attention and concentration:  fair  Intellect:  average  Insight:  limited  Judgment:  fair  Motor Activity: No abnormal involuntary movement noted today    Vitals  Temp:  [97 3 °F (36 3 °C)-98 3 °F (36 8 °C)] 98 3 °F (36 8 °C)  HR:  [62-79] 79  Resp:  [14] 14  BP: ()/(52-60) 116/60  SpO2:  [91 %-98 %] 98 %  No intake or output data in the 24 hours ending 05/12/20 0749    Lab Results: No New Labs Available For Today  Results from last 7 days   Lab Units 05/11/20  1343 05/08/20  0609   WBC Thousand/uL  --  7 40   RBC Million/uL  --  4 71   HEMOGLOBIN g/dL  --  14 5   HEMATOCRIT %  --  43 6   MCV fL  --  93   PLATELETS Thousands/uL  --  209   NEUTROS ABS Thousands/µL  --  3 70   COLOR UA  Yellow  --    CLARITY UA  Slightly Cloudy*  --    SPEC GRAV UA  1 015  --    PH UA  8 0  --    LEUKOCYTES UA  100 0*  --    NITRITE UA  Negative  --    GLUCOSE UA mg/dl Negative  --    KETONES UA mg/dl Negative  --    BILIRUBIN UA  Negative  --    UROBILINOGEN UA mg/dL Negative  --    BLOOD UA  Negative  --    RBC UA /hpf None Seen  --    WBC UA /hpf 2-4*  --    EPITHELIAL CELLS WET PREP /hpf Moderate*  --    BACTERIA UA /hpf Occasional  --          Current Facility-Administered Medications:  acetaminophen 325 mg Oral Q6H PRN Faheem Daniels MD   acetaminophen 650 mg Oral Q6H PRN Faheem Daniels MD   acetaminophen 650 mg Oral Q8H PRN Faheem Daniels MD   albuterol 2 puff Inhalation Q4H PRN Faheem Daniels MD   aluminum-magnesium hydroxide-simethicone 15 mL Oral Q4H PRN Faheem Daniels MD   ammonium lactate 1 application Topical BID PRN Faheem Daniels MD   benzonatate 100 mg Oral TID PRN Faheem Daniels MD   benztropine 1 mg Intramuscular Q8H PRN Faheem Daniels MD   carbamide peroxide 5 drop Left Ear BID PRN Faheem Daniels MD   cloZAPine 25 mg Oral BID Faheem Daniels MD   cloZAPine 50 mg Oral BID Faheem Daniels MD   docusate sodium 100 mg Oral BID PRN Faheem Daniels MD   EPINEPHrine PF 0 15 mg Intramuscular Once PRN Faheem Daniels MD   fluticasone-vilanterol 1 puff Inhalation Daily Faheem Daniels MD   ketotifen 1 drop Right Eye BID PRN Faheem Daniels MD   levothyroxine 125 mcg Oral Early Morning Faheem Daniels MD   magnesium hydroxide 30 mL Oral Daily PRN Faheem Daniels MD   montelukast 10 mg Oral HS Faheem Daniels MD   OLANZapine 5 mg Intramuscular Q8H PRN Faheem Daniels MD   OLANZapine 5 mg Oral Q8H PRN Faheem Daniels MD   ondansetron 4 mg Oral Q6H PRN Faheem Daniels MD   pantoprazole 40 mg Oral Early Morning Faheem Daniels MD   polyethylene glycol 17 g Oral Daily PRN Faheem Daniels MD   polyvinyl alcohol 1 drop Both Eyes Q3H PRN Faheem Daniels MD   sertraline 175 mg Oral Daily Faheem Daniels MD   sucralfate 1,000 mg Oral BID Faheem Daniels MD   theophylline 200 mg Oral Daily Faheem Daniels MD   tiotropium 18 mcg Inhalation Daily Faheem Daniels MD   traZODone 25 mg Oral HS PRN Faheem Daniels MD       Counseling / Coordination of Care: Total floor / unit time spent today 15 minutes  Greater than 50% of total time was spent with the patient and / or family counseling and / or somewhat receptive to supportive listening and teaching positive coping skills to deal with symptom mangement  Patient's Rights, confidentiality and exceptions to confidentiality, use of automated medical record, Jeb Patrick staff access to medical record, and consent to treatment reviewed

## 2020-05-12 NOTE — PROGRESS NOTES
05/12/20 0905   Team Meeting   Meeting Type Daily Rounds   Team Members Present   Team Members Present Physician;Nurse;; Other (Discipline and Name)   Physician Team Member Dr Jiles Osgood, RN   Care Management Team Member ASHLYN Ramírez   Other (Discipline and Name) JER PhillipsW; SHANIKA Higuera     No groups, somatic

## 2020-05-12 NOTE — PLAN OF CARE
Problem: Alteration in Thoughts and Perception  Goal: Verbalize thoughts and feelings  Description  Interventions:  - Promote a nonjudgmental and trusting relationship with the patient through active listening and therapeutic communication  - Assess patient's level of functioning, behavior and potential for risk  - Engage patient in 1 on 1 interactions for a minimum of 15 minutes each session  - Encourage patient to express fears, feelings, frustrations, and discuss symptoms    - [place] patient to reality, help patient recognize reality-based thinking   - Administer medications as ordered and assess for potential side effects  - Provide the patient education related to the signs and symptoms of the illness and desired effects of prescribed medications  Outcome: Progressing  Goal: Agree to be compliant with medication regime, as prescribed and report medication side effects  Description  Interventions:  - Offer appropriate PRN medication and supervise ingestion; conduct aims, as needed   Outcome: Progressing     Problem: Alteration in Thoughts and Perception  Goal: Treatment Goal: Gain control of psychotic behaviors/thinking, reduce/eliminate presenting symptoms and demonstrate improved reality functioning upon discharge  Outcome: Not Progressing  Goal: Attend and participate in unit activities, including therapeutic, recreational, and educational groups  Description  Interventions:  - Provide therapeutic and educational activities daily, encourage attendance and participation, and document same in the medical record     CERTIFIED PEER SPECIALIST INTERVENTIONS:    Complete peer assessment with patient to assess their needs and identify their goals to complete while in the recovery program as well as once discharged into the community  Patient will complete WRAP Plan, Crisis Plan and 5 Life Domains  Patient will attend 50% of groups offered on the unit  Patient will complete a goal card weekly  Outcome: Not Progressing  Goal: Recognize dysfunctional thoughts, communicate reality-based thoughts at the time of discharge  Description  Interventions:  - Provide medication and psycho-education to assist patient in compliance and developing insight into his/her illness   Outcome: Not Progressing  Goal: Complete daily ADLs, including personal hygiene independently, as able  Description  Interventions:  - Observe, teach, and assist patient with ADLS  - Monitor and promote a balance of rest/activity, with adequate nutrition and elimination   Outcome: Not Progressing    Quiet, apathetic, isolative to room and self  In bed sleeping except for meds and meals  Ate 50% of breakfast and 25% of lunch  Appears anxious and depressed  No si or hi  Air of entitlement  No somatic complaints  Urine Culture ordered, specimen collected and sent to lab, results pending  Attended treatment team   Did not attend other groups  Maintained on patient safety precautions w/o incident     Will continue to monitor progress in recovery program

## 2020-05-12 NOTE — PROGRESS NOTES
05/12/20 1512   Team Meeting   Meeting Type Tx Team Meeting   Initial Conference Date 05/12/20   Next Conference Date 05/19/20   Team Members Present   Team Members Present Physician;Nurse; Other (Discipline and Name);    Physician Team Member Dr Jessica Mancia, RN   Care Management Team Member ASHLYN Mclean   Other (Discipline and Name) JER Hodgson; Karie Homans, [facility]   Patient/Family Present   Patient Present No   Patient's Family Present No     Patient did not attend treatment team meeting this morning  Patient's group attendance for last week was 7%  Team and patient completed risk assessment and the patient did not verbalize any desire to elope from the program  Patient verbalized understanding of consequences of eloping from treatment while on a commitment while in his room  Patient verbalized no further questions or concerns at the conclusion of the meeting  Next team meeting scheduled for 5/19/2020

## 2020-05-13 NOTE — PROGRESS NOTES
Psychiatry Progress Note [facility] [address] [facility] 60 y o  female MRN: [id number]  Unit/Bed#: [facility] 111-01 Encounter: [id number]  Code Status: Level 1 - Full Code    PCP: Jaylene England PA-C    Date of Admission:  7/23/2019 4920   Date of Service:  05/13/20  Patient Active Problem List   Diagnosis    COPD with asthma ([facility] )    Tobacco use disorder, continuous    Compression fracture of L4 lumbar vertebra    Ventral hernia    [facility]    Schizoaffective disorder, bipolar type ([facility] )    Acquired hypothyroidism    Gastroesophageal reflux disease without esophagitis    Abnormal CT of the chest    Excessive cerumen in left ear canal    Lipoma of right upper extremity    Noncompliant with deep vein thrombosis (DVT) prophylaxis    At risk for aspiration    Ear ache    Tachycardia    Chest pain    Low HDL (under 40)     Diagnosis schizoaffective bipolarAssessment   Overall Status:  No showers since 7 days ago Still preoccupied disheveled poorly kept focused on psychosomatic symptoms and not attending all groups   Certification Statement to demonstrate a period of stability by attending to ADLs and becoming less psychosomatic in attending more groups Acceptance by patient:  Accepting  Fanny [place] in recovery:  Living at another personal care home   Understanding of medications: Yes    Involved in reintegration process: On hold due to [address] in relationship with psychiatrist:  Trusting sometimes    Medication changes    None today    Non-pharmacological treatments   Waiting for your culture and sensitivity report   Continue with individual, group, milieu and occupational therapy using recovery principles and psycho-education about accepting illness and the need for treatment   Encourage to take showers twice a week and cooperate with treatment and adl skills as per her behav   Plan   Therapeutic passes on hold due to covid 19 lock down   Prepare for the [place] [facility] and encouraged to accept  rather than refuse it   Reminded to attend at least 25% of groups on a daily basis including [facility]    Safety   Safety and communication plan established to target dynamic risk factors discussed above  Discharge Plan   · back to a [facility] at [address]   Patient has not taken a shower in over a week despite appearing disheveled poorly kept with uncombed her despite reminding her repeatedly  She continues to claim that she feels tired and depressed and has no energy to do anything despite reassurances that this is part of her psychosomatic complaints  She continues to believe she is going to die from terrible illness is despite verbal support and reassurance to the contrary  She has multiple excuses for not attending groups or taking showers and remains suspicious and paranoid eye examining her pills before she takes them and questioning his staff is tampering with the settings on her oxygen concentrator all the spirometer  She also believes that they are putting something inside her oxygen concentrator   She is not too receptive and seeks repeated reassurance and believes that she may have low blood sugar or severe infections or dying from choking on food off from heart disease etc   Still waiting for results of her urine culture and sensitivity  Sleep:   Fair  Appetite:   Fair but she picks and chooses her food and now asking to see a dietitian  Compliance with medications:  Good  Side effects:   none but claims to feel tired sometimes  Review of systems:  Continues to have psychosomatic symptoms and with increased frequency of urination    Mental Status Exam  Appearance:  Found laying on bed in her room claiming she feels too tired which is her usual complaint   Looks older than her stated age, poorly  groomed and not very well kept, with uncombed hairt but  wearing clean clothing   Anxious preoccupied    Has good eye contact   Suspicious in her interaction  Behavior:  Superficially friendly pleasant but anxious suspicious and preoccupied  Speech:  tangential at times with tendency to become stilted   Mood:   anxious irritated times,    Affect: increased in intensity range mood congruent redirectable  Thought Process:  Circumstantial with tendency to have stilted speech   Thought Content:  Paranoid about motives of staff switching her medications or tampering with her inhalant or her oxygen concentrator off and on  She also has to examine her pills literally before taking them  No preoccupation with violence or suicide  No current suicidal homicidal thoughts intent or plans reported  No phobias but has obsessions and compulsions about examining her pills before she takes them  Tye Montemayor Psychosomatic about dying by choking from food and from heart attack or from shortness of breath and now from catching Covid-19 which are all ongoing beliefs that she has  Tells me that she will try to take a shower attend  Perceptual Disturbances:  None reported not appearing responding to internal stimuli   Risk Potential:  Ability to care for herself and refusal to take showers  Sensorium: fully oriented to place, person, time, date, day, month, year and to situation  Cognition:  Grossly intact, aware of current events like the corona virus situation, recent and remote memory intact    No language deficit  Consciousness:  Alert and awake  Attention and concentration:  fair  Intellect:  average  Insight:  limited  Judgment:  fair  Motor Activity: No abnormal involuntary movement noted today    Vitals  Temp:  [97 5 °F (36 4 °C)] 97 5 °F (36 4 °C)  HR:  [69] 69  Resp:  [16] 16  BP: (95)/(56) 95/56  SpO2:  [91 %] 91 %  No intake or output data in the 24 hours ending 05/13/20 0737    Lab Results: No New Labs Available For Today  Results from last 7 days   Lab Units 05/11/20  1343 05/08/20  0609   WBC Thousand/uL  --  7 40   RBC Million/uL  -- 4  71   HEMOGLOBIN g/dL  --  14 5   HEMATOCRIT %  --  43 6   MCV fL  --  93   PLATELETS Thousands/uL  --  209   NEUTROS ABS Thousands/µL  --  3 70   COLOR UA  Yellow  --    CLARITY UA  Slightly Cloudy*  --    SPEC GRAV UA  1 015  --    PH UA  8 0  --    LEUKOCYTES UA  100 0*  --    NITRITE UA  Negative  --    GLUCOSE UA mg/dl Negative  --    KETONES UA mg/dl Negative  --    BILIRUBIN UA  Negative  --    UROBILINOGEN UA mg/dL Negative  --    BLOOD UA  Negative  --    RBC UA /hpf None Seen  --    WBC UA /hpf 2-4*  --    EPITHELIAL CELLS WET PREP /hpf Moderate*  --    BACTERIA UA /hpf Occasional  --          Current Facility-Administered Medications:  acetaminophen 325 mg Oral Q6H PRN Manuel Seeds, MD   acetaminophen 650 mg Oral Q6H PRN Manuel Seeds, MD   acetaminophen 650 mg Oral Q8H PRN Manuel Seeds, MD   albuterol 2 puff Inhalation Q4H PRN Raleigh Seeds, MD   aluminum-magnesium hydroxide-simethicone 15 mL Oral Q4H PRN Manuel Seeds, MD   ammonium lactate 1 application Topical BID PRN Manuel Seeds, MD   benzonatate 100 mg Oral TID PRN Manuel Seeds, MD   benztropine 1 mg Intramuscular Q8H PRN Manuel Seeds, MD   carbamide peroxide 5 drop Left Ear BID PRN Raleigh Seeds, MD   cloZAPine 25 mg Oral BID Manuel Seeds, MD   cloZAPine 50 mg Oral BID Manuel Seeds, MD   docusate sodium 100 mg Oral BID PRN Raleigh Seeds, MD   EPINEPHrine PF 0 15 mg Intramuscular Once PRN Raleigh Seeds, MD   fluticasone-vilanterol 1 puff Inhalation Daily Raleigh Seeds, MD   ketotifen 1 drop Right Eye BID PRN Raleigh Seeds, MD   levothyroxine 125 mcg Oral Early Morning Manuel Seeds, MD   magnesium hydroxide 30 mL Oral Daily PRN Raleigh Seeds, MD   montelukast 10 mg Oral HS Manuel Seeds, MD   OLANZapine 5 mg Intramuscular Q8H PRN Manuel Seeds, MD   OLANZapine 5 mg Oral Q8H PRN Raleigh Seeds, MD   ondansetron 4 mg Oral Q6H PRN Manuel Seeds, MD   pantoprazole 40 mg Oral Early Morning Manuel Seeds, MD   polyethylene glycol 17 g Oral Daily PRN Raleigh Seeds, MD   polyvinyl alcohol 1 drop Both Eyes Q3H PRN Zac Sierra MD   sertraline 175 mg Oral Daily Zac Sierra MD   sucralfate 1,000 mg Oral BID Zac Sierra MD   theophylline 200 mg Oral Daily Zac Sierra MD   tiotropium 18 mcg Inhalation Daily Zac Sierra MD   traZODone 25 mg Oral HS PRN Zac Sierra MD       Counseling / Coordination of Care: Total floor / unit time spent today 15 minutes  Greater than 50% of total time was spent with the patient and / or family counseling and / or somewhat receptive to supportive listening and teaching positive coping skills to deal with symptom mangement  Patient's Rights, confidentiality and exceptions to confidentiality, use of automated medical record, [facility] staff access to medical record, and consent to treatment reviewed

## 2020-05-13 NOTE — PLAN OF CARE
Problem: Alteration in Thoughts and Perception  Goal: Verbalize thoughts and feelings  Description  Interventions:  - Promote a nonjudgmental and trusting relationship with the patient through active listening and therapeutic communication  - Assess patient's level of functioning, behavior and potential for risk  - Engage patient in 1 on 1 interactions for a minimum of 15 minutes each session  - Encourage patient to express fears, feelings, frustrations, and discuss symptoms    - [place] patient to reality, help patient recognize reality-based thinking   - Administer medications as ordered and assess for potential side effects  - Provide the patient education related to the signs and symptoms of the illness and desired effects of prescribed medications  Outcome: Progressing  Goal: Agree to be compliant with medication regime, as prescribed and report medication side effects  Description  Interventions:  - Offer appropriate PRN medication and supervise ingestion; conduct aims, as needed   Outcome: Progressing     Problem: Alteration in Thoughts and Perception  Goal: Attend and participate in unit activities, including therapeutic, recreational, and educational groups  Description  Interventions:  - Provide therapeutic and educational activities daily, encourage attendance and participation, and document same in the medical record     CERTIFIED PEER SPECIALIST INTERVENTIONS:    Complete peer assessment with patient to assess their needs and identify their goals to complete while in the recovery program as well as once discharged into the community  Patient will complete WRAP Plan, Crisis Plan and 5 Life Domains  Patient will attend 50% of groups offered on the unit  Patient will complete a goal card weekly      Outcome: Not Progressing     Ate 25% of dinner +ensure and had a snack before bed, isolative, needed prompting for routine medications, gait steady, denied pain, social with staff, limited interaction with peers will continue to monitor

## 2020-05-13 NOTE — PLAN OF CARE
Problem: Alteration in Thoughts and Perception  Goal: Verbalize thoughts and feelings  Description  Interventions:  - Promote a nonjudgmental and trusting relationship with the patient through active listening and therapeutic communication  - Assess patient's level of functioning, behavior and potential for risk  - Engage patient in 1 on 1 interactions for a minimum of 15 minutes each session  - Encourage patient to express fears, feelings, frustrations, and discuss symptoms    - [place] patient to reality, help patient recognize reality-based thinking   - Administer medications as ordered and assess for potential side effects  - Provide the patient education related to the signs and symptoms of the illness and desired effects of prescribed medications  Outcome: Progressing  Goal: Agree to be compliant with medication regime, as prescribed and report medication side effects  Description  Interventions:  - Offer appropriate PRN medication and supervise ingestion; conduct aims, as needed   Outcome: Progressing     Problem: Depression  Goal: Verbalize thoughts and feelings  Description  Interventions:  - Assess and re-assess patient's level of risk   - Engage patient in 1:1 interactions, daily, for a minimum of 15 minutes   - Encourage patient to express feelings, fears, frustrations, hopes   Outcome: Progressing     Problem: Alteration in Orientation  Goal: Interact with staff daily  Description  Interventions:  - Assess and re-assess patient's level of orientation  - Engage patient in 1 on 1 interactions, daily, for a minimum of 15 minutes   - Establish rapport/trust with patient   Outcome: Progressing     Problem: PAIN - ADULT  Goal: Verbalizes/displays adequate comfort level or baseline comfort level  Description  Interventions:  - Encourage patient to monitor pain and request assistance  - Assess pain using appropriate pain scale  - Administer analgesics based on type and severity of pain and evaluate response  - Implement non-pharmacological measures as appropriate and evaluate response  - Consider cultural and social influences on pain and pain management  - Notify physician/advanced practitioner if interventions unsuccessful or patient reports new pain  Outcome: Progressing     Problem: SAFETY ADULT  Goal: Patient will remain free of falls  Description  INTERVENTIONS:  - Assess patient frequently for physical needs  -  Identify cognitive and physical deficits and behaviors that affect risk of falls  -  [place] fall precautions as indicated by assessment   - Educate patient/family on patient safety including physical limitations  - Instruct patient to call for assistance with activity based on assessment  - Modify environment to reduce risk of injury  - Consider OT/PT consult to assist with strengthening/mobility  Outcome: Parul Saravia has been isolative to her room and self most of the shift  No interaction with peers  Pleasant and cooperative upon approach  Has not been somatic thus far today  Ate 25% of breakfast (cereal and yogurt)  Took medication without swallowing issue  Disheveled appearance  Flat affect  Did not attend groups  Lays in bed most of the time and naps at intervals  Ate only 10% of lunch (pudding)  Did not go out for fresh air  Continue to monitor  Precautions maintained

## 2020-05-13 NOTE — PROGRESS NOTES
Maggie maintained on ongoing fall and SAFE precaution  [place] in bed with eyes closed, breath even and unlabored   On O2 with humidifier @1L/m via nasal cannula  Continues rounding implemented   No somatic complaint overnight  No PRN needed for sleep aid   No indication of pain or discomfort   Will continue to monitor

## 2020-05-13 NOTE — PLAN OF CARE
Problem: Alteration in Thoughts and Perception  Goal: Agree to be compliant with medication regime, as prescribed and report medication side effects  Description  Interventions:  - Offer appropriate PRN medication and supervise ingestion; conduct aims, as needed   Outcome: Progressing     Problem: Alteration in Thoughts and Perception  Goal: Attend and participate in unit activities, including therapeutic, recreational, and educational groups  Description  Interventions:  - Provide therapeutic and educational activities daily, encourage attendance and participation, and document same in the medical record     CERTIFIED PEER SPECIALIST INTERVENTIONS:    Complete peer assessment with patient to assess their needs and identify their goals to complete while in the recovery program as well as once discharged into the community  Patient will complete WRAP Plan, Crisis Plan and 5 Life Domains  Patient will attend 50% of groups offered on the unit  Patient will complete a goal card weekly  Outcome: Not Progressing  Goal: Complete daily ADLs, including personal hygiene independently, as able  Description  Interventions:  - Observe, teach, and assist patient with ADLS  - Monitor and promote a balance of rest/activity, with adequate nutrition and elimination   Outcome: Not Progressing     Problem: Depression  Goal: Refrain from isolation  Description  Interventions:  - Develop a trusting relationship   - Encourage socialization   Outcome: Not Progressing  Goal: Refrain from self-neglect  Outcome: Not Progressing     Problem: Nutrition/Hydration-ADULT  Goal: Nutrient/Hydration intake appropriate for improving, restoring or maintaining nutritional needs  Description  Monitor and assess patient's nutrition/hydration status for malnutrition  Collaborate with interdisciplinary team and initiate plan and interventions as ordered  Monitor patient's weight and dietary intake as ordered or per policy   Utilize nutrition screening tool and intervene as necessary  Determine patient's food preferences and provide high-protein, high-caloric foods as appropriate  INTERVENTIONS:  - Monitor oral intake, urinary output, labs, and treatment plans  - Assess nutrition and hydration status and recommend course of action  - Evaluate amount of meals eaten  - Assist patient with eating if necessary   - Allow adequate time for meals  - Recommend/ encourage appropriate diets, oral nutritional supplements, and vitamin/mineral supplements  - Order, calculate, and assess calorie counts as needed  - Recommend, monitor, and adjust tube feedings and TPN/PPN based on assessed needs  - Assess need for intravenous fluids  - Provide specific nutrition/hydration education as appropriate  - Include patient/family/caregiver in decisions related to nutrition  Outcome: Not Progressing     [address] continues to isolate in her room in bed w/face to wall asleep  Came out for meal which she refused, did have the Ensure  Did take her supper medicine when reminded  Now 8 days without shower or grooming & looks it; unkempt, uncombed, rumpled  No motivation to address hygiene  Has not responded to invitation to multi choice PM Group  Remains unmotivated w/assertions that "too tired"

## 2020-05-13 NOTE — TREATMENT TEAM
05/13/20 1100   Activity/Group Checklist   Group   (IMR/Recovery Anonymous )   Attendance Did not attend   Attendance Duration (min) 46-60   Affect/Mood IRMA

## 2020-05-13 NOTE — TREATMENT TEAM
05/13/20 0900   Activity/Group Checklist   Group Community meeting   Attendance Did not attend   Attendance Duration (min) 31-45   Affect/Mood IRMA

## 2020-05-14 NOTE — PROGRESS NOTES
Psychiatry Progress Note [address] 60 y o  female MRN: [id number]  Unit/Bed#: MARCIO ADAIR [facility] 111-01 Encounter: [id number]  Code Status: Level 1 - Full Code    PCP: Catie Feliz PA-C    Date of Admission:  7/23/2019 8118   Date of Service:  05/14/20  Patient Active Problem List   Diagnosis    COPD with asthma ([address] )    Tobacco use disorder, continuous    Compression fracture of L4 lumbar vertebra    Ventral hernia    Acute on chronic respiratory failure with hypoxia ([address] )    Schizoaffective disorder, bipolar type ([facility] )    Acquired hypothyroidism    Gastroesophageal reflux disease without esophagitis    Abnormal CT of the chest    Excessive cerumen in left ear canal    Lipoma of right upper extremity    Noncompliant with deep vein thrombosis (DVT) prophylaxis    At risk for aspiration    Ear ache    Tachycardia    Chest pain    Low HDL (under 40)     Diagnosis schizoaffective bipolar  Assessment    Overall Status:still refusing showers for the last 9 days not redirectable claiming to feel tired and continues to express psychosomatic symptoms refusing groups     Certification Statement to demonstrate a period of stability by attending to ADLs and becoming less psychosomatic in attending more groups Acceptance by patient:  Accepting  Rich Anderson in recovery:  Living at another personal care home   Understanding of medications: Yes    Involved in reintegration process: On hold due to [address] in relationship with psychiatrist:  Trusting sometimes    Medication changes    None today    Non-pharmacological treatments   Waiting for your culture and sensitivity report   Continue with individual, group, milieu and occupational therapy using recovery principles and psycho-education about accepting illness and the need for treatment   Encourage to take showers twice a week and cooperate with treatment and adl skills as per her behav  Plan   Therapeutic passes on hold due to covid 19 lock down   Prepare for the [facility] and encouraged to accept  rather than refuse it   Reminded to attend at least 25% of groups on a daily basis including [facility]    Safety   Safety and communication plan established to target dynamic risk factors discussed above  Discharge Plan   · back to a [facility]    Interval Progress   Patient continues to refuse showers for the last 9 days in a row and remains disheveled poorly groomed with uncombed hair preferring to lay back on bed claiming to be depressed tired with no energy to do anything despite reassurances  He she continues to express psychosomatic complaints of fear that she is dying from terrible illness is or from choking on food or from heart attack or from shortness of breath etc   He she claims that she will take a shower today but she is passive-aggressive in making such progresses and does not comply  She continues to express the same paranoia about people putting something inside her oxygen concentrator or tampering with her pills which is an ongoing paranoid team   Is still waiting for the urine culture and sensitivity report  He continues to have multiple excuses for not attending groups or getting out of bed on a daily basis but seems to respond to verbal reassurance and support  Sleep:   Fair  Appetite:   Fair but she picks and chooses her food and now asking to see a dietitian  Compliance with medications:  Good  Side effects:   none but claims to feel tired sometimes  Review of systems:  Continues to have psychosomatic symptoms and with increased frequency of urination    Mental Status Exam  Appearance:  Found laying on bed in her room claiming she feels too tired which is her usual complaint   Looks older than her stated age, poorly  groomed and not very well kept, with uncombed hairt but  wearing clean clothing   Anxious preoccupied  Has good eye contact     Suspicious in her interaction  Behavior:  Superficially friendly pleasant but anxious suspicious and preoccupied  Speech:  tangential at times with tendency to become stilted   Mood:   anxious irritated times,    Affect: increased in intensity range mood congruent redirectable  Thought Process:  Circumstantial with tendency to have stilted speech   Thought Content:  Paranoid about motives of staff switching her medications or tampering with her inhalant or her oxygen concentrator off and on  She also has to examine her pills literally before taking them  No preoccupation with violence or suicide  No current suicidal homicidal thoughts intent or plans reported  No phobias but has obsessions and compulsions about examining her pills before she takes them  Tyrone Kenrick Psychosomatic about dying by choking from food and from heart attack or from shortness of breath and now from catching Covid-19 which are all ongoing beliefs that she has  Tells me that she will try to take a shower attend  Perceptual Disturbances:  None reported not appearing responding to internal stimuli   Risk Potential:  Ability to care for herself and refusal to take showers  Sensorium: fully oriented to place, person, time, date, day, month, year and to situation  Cognition:  Grossly intact, aware of current events like the corona virus situation, recent and remote memory intact    No language deficit  Consciousness:  Alert and awake  Attention and concentration:  fair  Intellect:  average  Insight:  limited  Judgment:  fair  Motor Activity: No abnormal involuntary movement noted today    Vitals  Temp:  [97 6 °F (36 4 °C)] 97 6 °F (36 4 °C)  HR:  [70] 70  Resp:  [18] 18  BP: (90)/(60) 90/60  No intake or output data in the 24 hours ending 05/14/20 1000    Lab Results: No New Labs Available For Today  Results from last 7 days   Lab Units 05/11/20  1343 05/08/20  0609   WBC Thousand/uL  --  7 40   RBC Million/uL  --  4 71   HEMOGLOBIN g/dL  --  14 5   HEMATOCRIT %  -- 43 6   MCV fL  --  93   PLATELETS Thousands/uL  --  209   NEUTROS ABS Thousands/µL  --  3 70   COLOR UA  Yellow  --    CLARITY UA  Slightly Cloudy*  --    SPEC GRAV UA  1 015  --    PH UA  8 0  --    LEUKOCYTES UA  100 0*  --    NITRITE UA  Negative  --    GLUCOSE UA mg/dl Negative  --    KETONES UA mg/dl Negative  --    BILIRUBIN UA  Negative  --    UROBILINOGEN UA mg/dL Negative  --    BLOOD UA  Negative  --    RBC UA /hpf None Seen  --    WBC UA /hpf 2-4*  --    EPITHELIAL CELLS WET PREP /hpf Moderate*  --    BACTERIA UA /hpf Occasional  --          Current Facility-Administered Medications:  acetaminophen 325 mg Oral Q6H PRN Faheem Daniels MD   acetaminophen 650 mg Oral Q6H PRN Faheem Daniels MD   acetaminophen 650 mg Oral Q8H PRN Faheem Daniels MD   albuterol 2 puff Inhalation Q4H PRN Faheem Daniels MD   aluminum-magnesium hydroxide-simethicone 15 mL Oral Q4H PRN Faheem Daniels MD   ammonium lactate 1 application Topical BID PRBJORN Daniels MD   benzonatate 100 mg Oral TID PRN Faheem Daniels MD   benztropine 1 mg Intramuscular Q8H PRN Faheem Daniels MD   carbamide peroxide 5 drop Left Ear BID PRBJORN Daniels MD   cloZAPine 25 mg Oral BID Faheem Daniels MD   cloZAPine 50 mg Oral BID Faheem Daniels MD   docusate sodium 100 mg Oral BID PRN Faheem Daniels MD   EPINEPHrine PF 0 15 mg Intramuscular Once PRN Faheem Daniels MD   fluticasone-vilanterol 1 puff Inhalation Daily Faheem Daniels MD   ketotifen 1 drop Right Eye BID PRBJORN Daniels MD   levothyroxine 125 mcg Oral Early Morning Faheem Daniels MD   magnesium hydroxide 30 mL Oral Daily PRN Faheem Daniels MD   montelukast 10 mg Oral HS Faheem Daniels MD   OLANZapine 5 mg Intramuscular Q8H PRN Faheem Daniels MD   OLANZapine 5 mg Oral Q8H PRN Faheem Daniels MD   ondansetron 4 mg Oral Q6H PRN Faheem Daniels MD   pantoprazole 40 mg Oral Early Morning Faheem Daniels MD   polyethylene glycol 17 g Oral Daily PRN Faheem Daniels MD   polyvinyl alcohol 1 drop Both Eyes Q3H PRN Faheem Daniels MD   sertraline 175 mg Oral Daily Christian Bennett MD   sucralfate 1,000 mg Oral BID Christian Bennett MD   theophylline 200 mg Oral Daily Christian Bennett MD   tiotropium 18 mcg Inhalation Daily Christian Bennett MD   traZODone 25 mg Oral HS PRN Christian Bennett MD       Counseling / Coordination of Care: Total floor / unit time spent today 15 minutes  Greater than 50% of total time was spent with the patient and / or family counseling and / or somewhat receptive to supportive listening and teaching positive coping skills to deal with symptom mangement  Patient's Rights, confidentiality and exceptions to confidentiality, use of automated medical record, Jeb Patrick staff access to medical record, and consent to treatment reviewed

## 2020-05-14 NOTE — PROGRESS NOTES
05/14/20 1052   Team Meeting   Meeting Type Daily Rounds   Team Members Present   Team Members Present Physician;Nurse;; Other (Discipline and Name)   Physician Team Member Dr Hi Rubio Team Member Keyona Garcia RN   Care Management Team Member Ebony Dodge   Other (Discipline and Name) JER AzarW; SHANIKA Love     No groups, doing nothing  Poor appetite, awaiting Clozaril level

## 2020-05-14 NOTE — PROGRESS NOTES
2145 Katty Bowles was unwilling to perform her Incentive Spirometry; "I don't want to; I'm not feeling well " Pointed out to her that consistently doing nothing is not conducive to her feeling well  Offers no comment  Did come out for an HS snack, took her HS medicine except the Singulair  Wearing now her QHS humidified nasal O2 @ 1L for bed

## 2020-05-14 NOTE — TREATMENT TEAM
05/14/20 1100   Activity/Group Checklist   Group   (IMR/Pictionary )   Attendance Did not attend   Attendance Duration (min) 46-60   Affect/Mood IRMA

## 2020-05-14 NOTE — PROGRESS NOTES
Maggie maintained on ongoing fall and SAFE precaution  Elfreda Ledger in bed with eyes closed, breath even and unlabored   On O2 with humidifier @1L/m via nasal cannula  Continues rounding implemented   No somatic complaint overnight  No PRN needed for sleep aid   No indication of pain or discomfort   Will continue to monitor

## 2020-05-14 NOTE — PLAN OF CARE
Problem: Alteration in Thoughts and Perception  Goal: Verbalize thoughts and feelings  Description  Interventions:  - Promote a nonjudgmental and trusting relationship with the patient through active listening and therapeutic communication  - Assess patient's level of functioning, behavior and potential for risk  - Engage patient in 1 on 1 interactions for a minimum of 15 minutes each session  - Encourage patient to express fears, feelings, frustrations, and discuss symptoms    - Vesta patient to reality, help patient recognize reality-based thinking   - Administer medications as ordered and assess for potential side effects  - Provide the patient education related to the signs and symptoms of the illness and desired effects of prescribed medications  Outcome: Progressing  Goal: Agree to be compliant with medication regime, as prescribed and report medication side effects  Description  Interventions:  - Offer appropriate PRN medication and supervise ingestion; conduct aims, as needed   Outcome: Progressing     Problem: Anxiety  Goal: Anxiety is at manageable level  Description  Interventions:  - Assess and monitor patient's anxiety level  - Monitor for signs and symptoms of anxiety both physical and emotional (heart palpitations, chest pain, shortness of breath, headaches, nausea, feeling jumpy, restlessness, irritable, apprehensive)  - Collaborate with interdisciplinary team and initiate plan and interventions as ordered    - Vesta patient to unit/surroundings  - Explain treatment plan  - Encourage participation in care  - Encourage verbalization of concerns/fears  - Identify coping mechanisms  - Assist in developing anxiety-reducing skills  - Administer/offer alternative therapies  - Limit or eliminate stimulants  Outcome: Progressing     Problem: Alteration in Orientation  Goal: Interact with staff daily  Description  Interventions:  - Assess and re-assess patient's level of orientation  - Engage patient in 1 on 1 interactions, daily, for a minimum of 15 minutes   - Establish rapport/trust with patient   Outcome: Progressing     Problem: Alteration in Thoughts and Perception  Goal: Attend and participate in unit activities, including therapeutic, recreational, and educational groups  Description  Interventions:  - Provide therapeutic and educational activities daily, encourage attendance and participation, and document same in the medical record     CERTIFIED PEER SPECIALIST INTERVENTIONS:    Complete peer assessment with patient to assess their needs and identify their goals to complete while in the recovery program as well as once discharged into the community  Patient will complete WRAP Plan, Crisis Plan and 5 Life Domains  Patient will attend 50% of groups offered on the unit  Patient will complete a goal card weekly  Outcome: Not Progressing  Goal: Complete daily ADLs, including personal hygiene independently, as able  Description  Interventions:  - Observe, teach, and assist patient with ADLS  - Monitor and promote a balance of rest/activity, with adequate nutrition and elimination   Outcome: Not Progressing     Problem: Depression  Goal: Refrain from isolation  Description  Interventions:  - Develop a trusting relationship   - Encourage socialization   Outcome: Not Tamie Justin has been isolative to her room and self throughout the shift  Continues to lay in bed and nap at times  No motivation to do activities  Encouraged by staff to do more than lay in bed  Has not been somatic  Did Incentive spirometer with encouragement and obtained 1150-1250ml's  Took pills without swallowing issue  Ate 50% of her breakfast  Disheveled appearance  No shower or BM  Did not attend any groups  Only ate 2 french fries and drank juice/milk for lunch  Did not go out for fresh air  Continue to monitor  Precautions maintained

## 2020-05-14 NOTE — TREATMENT TEAM
05/14/20 0900   Activity/Group Checklist   Group Community meeting   Attendance Did not attend   Attendance Duration (min) 31-45   Affect/Mood IRMA

## 2020-05-15 NOTE — PROGRESS NOTES
Progress Note - Behavioral Health     Chun Fullalbina 60 y o  female MRN: [id number]   Unit/Bed#: MARCIO ADAIR [facility] 111-01 Encounter: [id number]    Per Nursing: Nursing reports that the patient refused her incentive spirometry last evening because she was "too tired " She has been compliant with her meals  She was overheard this morning loudly arguing with nursing staff because she continues to refuse to take her Abilify  She was argumentative and irritable and complained that she does not need to take this medication and it should not be prescribed for her  She demanded to speak with the doctor regarding this medication  Per Patient: When patient speaks with this provider, she does not mention any problems with Abilify  She does not complain about the medication at all and states that she does not have any issues or complaints with her medication regimen  She states that she is currently having a "hard time with the virus " She is upset that she can't leave the unit to go on a therapeutic day pass and states that she is upset that she can't have visitors  She states that she is depressed, but adds that it is "circumstantial " She states that her anxiety is a "5 out of 10" with 10 being the worst  Patient denies any thoughts of wanting to harm self or others  Patient denies any recent self-injurious behaviors  Patient denies any active or passive suicidal ideation, intent or plan  Patient is able to contract for safety at the present time  Patient remains future-oriented and goal-directed, as well as motivated and help seeking  She denies any auditory or visual hallucinations  She wants to take a nap in the middle of the day prior to lunch arriving  She states that she feels fine overall  Behavior over the last 24 hours: unchanged     Sleep: normal  Appetite: normal  Medication side effects: No   ROS: no complaints, all other systems are negative  Any positives in the Comprehensive Review of Systems were noted in the HPI  All other Review of Systems were negative  Mental Status Evaluation:    Appearance:  laying in bed, disheveled   Behavior:  labile, calm and cooperative with this provider, witnessed being loud and argumentative with nursing staff, refused to take Abilify, labile   Speech:  normal rate and volume   Mood:  depressed, anxious, labile, "circumstantial"   Affect:  labile, reactive   Thought Process:  goal directed   Associations: intact associations   Thought Content:  no overt delusions, ruminating thoughts   Perceptual Disturbances: denies auditory hallucinations when asked   Risk Potential: Suicidal ideation - None at present  Homicidal ideation - None at present  Potential for aggression - Not at present   Sensorium:  oriented to person, place and time/date   Memory:  recent and remote memory grossly intact   Consciousness:  alert and awake   Attention: attention span and concentration appear shorter than expected for age   Insight:  limited   Judgment: limited   Gait/Station: in bed   Motor Activity: no abnormal movements     Vital signs in last 24 hours:  Vitals:    05/16/20 0705   BP: (!) 87/52   Pulse: 59   Resp:    Temp:    SpO2:          Laboratory results:   I have personally reviewed all pertinent laboratory/tests results    Most Recent Labs:   Lab Results   Component Value Date    WBC 7 40 05/08/2020    RBC 4 71 05/08/2020    HGB 14 5 05/08/2020    HCT 43 6 05/08/2020     05/08/2020    RDW 14 2 05/08/2020    NEUTROABS 3 70 05/08/2020    SODIUM 139 04/20/2020    K 3 9 04/20/2020     04/20/2020    CO2 29 04/20/2020    BUN 18 04/20/2020    CREATININE 0 69 04/20/2020    GLUC 88 04/20/2020    GLUF 88 04/20/2020    CALCIUM 9 2 04/20/2020    AST 18 02/29/2020    ALT 25 02/29/2020    ALKPHOS 116 02/29/2020    TP 7 4 02/29/2020    ALB 4 1 02/29/2020    TBILI 0 50 02/29/2020    CHOLESTEROL 210 (H) 10/25/2019    HDL 38 (L) 10/25/2019    TRIG 134 10/25/2019    LDLCALC 145 (H) 10/25/2019 [place] 172 10/25/2019    LITHIUM <0 2 (L) 05/01/2019    AMMONIA 13 02/22/2016    RSL1SDAWECNK 2 230 02/29/2020    FREET4 1 4 05/12/2015    RPR Non-Reactive 05/02/2019    HGBA1C 5 5 01/17/2019     01/17/2019       Progress Toward Goals: progressing    Assessment/Plan   Principal Problem:    Schizoaffective disorder, bipolar type ([address] )  Active Problems:    COPD with asthma (HCC)    Acquired hypothyroidism    Gastroesophageal reflux disease without esophagitis    At risk for aspiration    Ear ache    Tachycardia    Chest pain    Low HDL (under 40)    Recommended Treatment:     Planned medication and treatment changes:     All current active medications have been reviewed  Encourage group therapy, milieu therapy and occupational therapy  Behavioral Health checks every 7 minutes  Continue current medications:    Current Facility-Administered Medications:  acetaminophen 325 mg Oral Q6H PRN Jerome Lopez MD   acetaminophen 650 mg Oral Q6H PRN Jerome Lopez MD   acetaminophen 650 mg Oral Q8H PRN Jerome Lopez MD   albuterol 2 puff Inhalation Q4H PRN Jerome Lopez MD   aluminum-magnesium hydroxide-simethicone 15 mL Oral Q4H PRN Jerome Lopez MD   ammonium lactate 1 application Topical BID PRN Jerome Lopez MD   ARIPiprazole 2 mg Oral Daily Jerome Lopez MD   benzonatate 100 mg Oral TID PRN Jerome Lopez MD   benztropine 1 mg Intramuscular Q8H PRN Jerome Lopez MD   carbamide peroxide 5 drop Left Ear BID PRN Jerome Lopez MD   cloZAPine 25 mg Oral BID Jerome Lopez MD   cloZAPine 50 mg Oral BID Jerome Lopez MD   docusate sodium 100 mg Oral BID PRN Jerome Lopez MD   EPINEPHrine PF 0 15 mg Intramuscular Once PRN Jerome Lopez MD   fluticasone-vilanterol 1 puff Inhalation Daily Jerome Lopez MD   ketotifen 1 drop Right Eye BID PRN Jerome Lopez MD   levothyroxine 125 mcg Oral Early Morning Jerome Lopez MD   magnesium hydroxide 30 mL Oral Daily PRN Jerome Lopez MD   montelukast 10 mg Oral HS Jerome Lopez MD   OLANZapine 5 mg Intramuscular Q8H PRN Sarah Hnana MD   OLANZapine 5 mg Oral Q8H PRN Sarah Hanna MD   ondansetron 4 mg Oral Q6H PRN Sarah Hanna MD   pantoprazole 40 mg Oral Early Morning Sarah Hanna MD   polyethylene glycol 17 g Oral Daily PRN Sarah Hanna MD   polyvinyl alcohol 1 drop Both Eyes Q3H PRN Sarah Hanna MD   sertraline 175 mg Oral Daily Sarah Hanna MD   sucralfate 1,000 mg Oral BID Sarah Hanna MD   theophylline 200 mg Oral Daily Sarah Hanna MD   tiotropium 18 mcg Inhalation Daily Sarah Hanna MD   traZODone 25 mg Oral HS PRN Sarah Hanna MD           Plan:  1) Patient continues to refuse her Abilify  She is labile and noticeably agitated and argumentative with nursing staff, but pleasant and cooperative with provider  Will continue to monitor on the unit and defer medication management decisions regarding Abilify to patient's attending Psychiatrist   2) Continue all current medications as directed:    Abilify 2 mg once daily by mouth  o Patient has refused to take this medication both days that it has been offered to her   Clozaril 75 mg twice daily by mouth   Zoloft 175 mg once daily by mouth  3) Medical   All medical comorbidities are under the care of [facility] Service  4) Continue to work with Case Management to determine patient's disposition following discharge  Risks / Benefits of Treatment:    Risks, benefits, and possible side effects of medications explained to patient and patient verbalizes understanding and agreement for treatment  Counseling / Coordination of Care:    Patient's progress reviewed with nursing staff  Medications, treatment progress and treatment plan reviewed with patient      Ana Maria Lieberman PA-C 05/16/20

## 2020-05-15 NOTE — PROGRESS NOTES
Maggie maintained on ongoing fall and SAFE precaution  Vandana Aw in bed with eyes closed, breath even and unlabored   On O2 with humidifier @1L/m via nasal cannula  Continues rounding implemented   No somatic complaint overnight  No PRN needed for sleep aid   No indication of pain or discomfort   Will continue to monitor

## 2020-05-15 NOTE — PLAN OF CARE
Problem: Alteration in Thoughts and Perception  Goal: Verbalize thoughts and feelings  Description  Interventions:  - Promote a nonjudgmental and trusting relationship with the patient through active listening and therapeutic communication  - Assess patient's level of functioning, behavior and potential for risk  - Engage patient in 1 on 1 interactions for a minimum of 15 minutes each session  - Encourage patient to express fears, feelings, frustrations, and discuss symptoms    - [place] patient to reality, help patient recognize reality-based thinking   - Administer medications as ordered and assess for potential side effects  - Provide the patient education related to the signs and symptoms of the illness and desired effects of prescribed medications  Outcome: Progressing  Goal: Agree to be compliant with medication regime, as prescribed and report medication side effects  Description  Interventions:  - Offer appropriate PRN medication and supervise ingestion; conduct aims, as needed   Outcome: Progressing     Problem: Alteration in Orientation  Goal: Interact with staff daily  Description  Interventions:  - Assess and re-assess patient's level of orientation  - Engage patient in 1 on 1 interactions, daily, for a minimum of 15 minutes   - Establish rapport/trust with patient   Outcome: Progressing     Problem: SAFETY ADULT  Goal: Patient will remain free of falls  Description  INTERVENTIONS:  - Assess patient frequently for physical needs  -  Identify cognitive and physical deficits and behaviors that affect risk of falls    -  [place] fall precautions as indicated by assessment   - Educate patient/family on patient safety including physical limitations  - Instruct patient to call for assistance with activity based on assessment  - Modify environment to reduce risk of injury  - Consider OT/PT consult to assist with strengthening/mobility  Outcome: Progressing     Problem: Alteration in Thoughts and Perception  Goal: Attend and participate in unit activities, including therapeutic, recreational, and educational groups  Description  Interventions:  - Provide therapeutic and educational activities daily, encourage attendance and participation, and document same in the medical record     CERTIFIED PEER SPECIALIST INTERVENTIONS:    Complete peer assessment with patient to assess their needs and identify their goals to complete while in the recovery program as well as once discharged into the community  Patient will complete WRAP Plan, Crisis Plan and 5 Life Domains  Patient will attend 50% of groups offered on the unit  Patient will complete a goal card weekly  Outcome: Not Progressing  Goal: Complete daily ADLs, including personal hygiene independently, as able  Description  Interventions:  - Observe, teach, and assist patient with ADLS  - Monitor and promote a balance of rest/activity, with adequate nutrition and elimination   Outcome: Not Progressing     Problem: Depression  Goal: Refrain from isolation  Description  Interventions:  - Develop a trusting relationship   - Encourage socialization   Outcome: Jannette Martines Olman has been in her room a majority of the shift  Only came out for medication and meals  Admits to being depressed  She spoke with Dr Elsa Willis and he started her on Abilify 2mg po daily, but refused today  Social with staff  Pleasant and cooperative upon approach  Interacts with select peers when out for meals  Disheveled appearance  No shower for 10 days  She lacks motivation  Staff has encouraged her to attend groups and to come out of her room  Remains in bed and naps at times  Took AM medication without swallowing issue  Ate only 25% of breakfast  Did not attended any activities  Ate 25% of lunch  Continue to monitor  Precautions maintained

## 2020-05-15 NOTE — PROGRESS NOTES
05/15/20 1100   Activity/Group Checklist   Group Other (Comment)  (IMR - Music Appreciation)   Attendance Did not attend     Patient encouraged to attend group, but declined  Sitting up in bed when prompted

## 2020-05-15 NOTE — PLAN OF CARE
Problem: Alteration in Thoughts and Perception  Goal: Verbalize thoughts and feelings  Description  Interventions:  - Promote a nonjudgmental and trusting relationship with the patient through active listening and therapeutic communication  - Assess patient's level of functioning, behavior and potential for risk  - Engage patient in 1 on 1 interactions for a minimum of 15 minutes each session  - Encourage patient to express fears, feelings, frustrations, and discuss symptoms    - [place] patient to reality, help patient recognize reality-based thinking   - Administer medications as ordered and assess for potential side effects  - Provide the patient education related to the signs and symptoms of the illness and desired effects of prescribed medications  Outcome: Progressing  Goal: Agree to be compliant with medication regime, as prescribed and report medication side effects  Description  Interventions:  - Offer appropriate PRN medication and supervise ingestion; conduct aims, as needed   Outcome: Progressing     Problem: Nutrition/Hydration-ADULT  Goal: Nutrient/Hydration intake appropriate for improving, restoring or maintaining nutritional needs  Description  Monitor and assess patient's nutrition/hydration status for malnutrition  Collaborate with interdisciplinary team and initiate plan and interventions as ordered  Monitor patient's weight and dietary intake as ordered or per policy  Utilize nutrition screening tool and intervene as necessary  Determine patient's food preferences and provide high-protein, high-caloric foods as appropriate       INTERVENTIONS:  - Monitor oral intake, urinary output, labs, and treatment plans  - Assess nutrition and hydration status and recommend course of action  - Evaluate amount of meals eaten  - Assist patient with eating if necessary   - Allow adequate time for meals  - Recommend/ encourage appropriate diets, oral nutritional supplements, and vitamin/mineral supplements  - Order, calculate, and assess calorie counts as needed  - Recommend, monitor, and adjust tube feedings and TPN/PPN based on assessed needs  - Assess need for intravenous fluids  - Provide specific nutrition/hydration education as appropriate  - Include patient/family/caregiver in decisions related to nutrition  Outcome: Progressing     Problem: Alteration in Thoughts and Perception  Goal: Attend and participate in unit activities, including therapeutic, recreational, and educational groups  Description  Interventions:  - Provide therapeutic and educational activities daily, encourage attendance and participation, and document same in the medical record     CERTIFIED PEER SPECIALIST INTERVENTIONS:    Complete peer assessment with patient to assess their needs and identify their goals to complete while in the recovery program as well as once discharged into the community  Patient will complete WRAP Plan, Crisis Plan and 5 Life Domains  Patient will attend 50% of groups offered on the unit  Patient will complete a goal card weekly  Outcome: Not Progressing  Goal: Complete daily ADLs, including personal hygiene independently, as able  Description  Interventions:  - Observe, teach, and assist patient with ADLS  - Monitor and promote a balance of rest/activity, with adequate nutrition and elimination   Outcome: Not Progressing     Problem: Depression  Goal: Refrain from isolation  Description  Interventions:  - Develop a trusting relationship   - Encourage socialization   Outcome: Not Progressing     [address] continues to isolate in her room in bed  She expressed desire to shower, but, unwilling to come for her hygiene bin, "too heavy"  When offered help w/the heavy shampoo bottles, declined shower (9 days since last) altogether  "I'm too weak " Has come out for scheduled medicines  Ate all mashed potato @ meal, the only thing she had ordered  Drank an Ensure  Did have HS snack   Did not attend Women's Group or PM Group  Did the Incentive Spirometry, but, didn't do as well as usual, got 1100ml instead of her usual 1250ml  Is wearing now her QHS humidified nasal O2 @ 1L for bed

## 2020-05-15 NOTE — PROGRESS NOTES
Psychiatry Progress Note [address] 60 y o  female MRN: [id number]  Unit/Bed#: [facility] 111-01 Encounter: [id number]  Code Status: Level 1 - Full Code    PCP: Naila Khalil PA-C    Date of Admission:  7/23/2019 1730   Date of Service:  05/15/20  Patient Active Problem List   Diagnosis    COPD with asthma ([facility] )    Tobacco use disorder, continuous    Compression fracture of L4 lumbar vertebra    Ventral hernia    Acute on chronic respiratory failure with hypoxia ([facility] )    Schizoaffective disorder, bipolar type ([facility] )    Acquired hypothyroidism    Gastroesophageal reflux disease without esophagitis    Abnormal CT of the chest    Excessive cerumen in left ear canal    Lipoma of right upper extremity    Noncompliant with deep vein thrombosis (DVT) prophylaxis    At risk for aspiration    Ear ache    Tachycardia    Chest pain    Low HDL (under 40)     Diagnosis schizoaffective bipolar  Assessment    Overall Status:  Still claims to feel depressed withdrawn isolated anxious with increased psychosomatic symptoms refusing showers for almost 2 weeks and sparingly eating but without any significant loss of weight and claims to hear voices and feeling paranoid     Certification Statement to demonstrate a period of stability by attending to ADLs and becoming less psychosomatic in attending more groups Acceptance by patient:  Accepting  Tiago [place] in recovery:  Living at another personal care home   Understanding of medications: Yes    Involved in reintegration process:   On hold due to [address] in relationship with psychiatrist:  Trusting sometimes    Medication changes   Add Abilify 2 mg a day as opposed of her depression as well as for increasing psychotic symptoms as I cannot increase the clozapine because of her age and the QT interval prolongation in the past   Patient was made aware about the benefits and risks about Abilify trial  Non-pharmacological treatments   Waiting for your culture and sensitivity report   Continue with individual, group, milieu and occupational therapy using recovery principles and psycho-education about accepting illness and the need for treatment   Encourage to take showers twice a week and cooperate with treatment and adl skills as per her behav  Plan   Therapeutic passes on hold due to covid 19 lock down   Prepare for the [facility] and encouraged to accept  rather than refuse it   Reminded to attend at least 25% of groups on a daily basis including IMR    Safety   Safety and communication plan established to target dynamic risk factors discussed above  Discharge Plan   · back to a [facility] at [address]   Patient is again refused to take showers for the last 10 days in a row and remains poorly groomed disheveled unkempt with uncombed hair preferring to lay back on her bed  Today she claims to feel tired and depressed now complains about fluid in her left ear which is also a delusion or psychosomatic complaint  She continues to believe she is dying from terrible illness is and her urine culture came back as within normal limits  She is still passive-aggressive in making promises to take a shower and does not comply  She is now more paranoid about people putting things into her oxygen concentrator and she has to examine her pills before she swallows them  She is still refusing to attend groups or get out of bed and claims to hear voices of people who were not in the room that are conversations and calling her name    She does appear somewhat anxious preoccupied and paranoid  Sleep:   Fair  Appetite:   Fair but she picks and chooses her food and now asking to see a dietitian  Compliance with medications:  Good  Side effects:   none but claims to feel tired sometimes  Review of systems:  Continues to have psychosomatic symptoms and with increased frequency of urination    Mental Status Exam  Appearance: Laying on bed in her room claiming she feels too tired which is her usual complaint but did sit up when approached  Looks older than her stated age, poorly  groomed and not very well kept, with uncombed hairt but  wearing clean clothing   Anxious preoccupied  Has good eye contact   Suspicious in her interaction  Behavior:  Superficially friendly pleasant but anxious suspicious and preoccupied  Speech:  tangential at times with tendency to become stilted   Mood:   anxious irritated times,    Affect: increased in intensity range mood congruent redirectable  Thought Process:  Circumstantial with tendency to have stilted speech   Thought Content:  Increasingly Paranoid about motives of staff switching her medications or tampering with her inhalant or her oxygen concentrator off and on  She also has to examine her pills literally before taking them  No preoccupation with violence or suicide  No current suicidal homicidal thoughts intent or plans reported  No phobias but has obsessions and compulsions about examining her pills before she takes them  [place] Psychosomatic about dying by choking from food and from heart attack or from shortness of breath and now from catching Covid-19 which are all ongoing beliefs that she has  Tells me that she will try to take a shower attend  Perceptual Disturbances:  Claims to hear voices calling her name hand hearing conversations when no one is around Risk Potential:  Ability to care for herself and refusal to take showers  Sensorium: fully oriented to place, person, time, date, day, month, year and to situation  Cognition:  Grossly intact, aware of current events like the corona virus situation, recent and remote memory intact    No language deficit  Consciousness:  Alert and awake  Attention and concentration:  fair  Intellect:  average  Insight:  limited  Judgment:  fair  Motor Activity: No abnormal involuntary movement noted today    Vitals  Temp: [97 1 °F (36 2 °C)-98 °F (36 7 °C)] 98 °F (36 7 °C)  HR:  [80-86] 80  Resp:  [18] 18  BP: ()/(57-70) 90/60  SpO2:  [90 %-93 %] 93 %  No intake or output data in the 24 hours ending 05/15/20 1039    Lab Results: No New Labs Available For Today  Results from last 7 days   Lab Units 05/12/20  0615 05/11/20  1343   CLOZAPINE LVL ng/mL 323*  --    NORCLOZAPINE ng/mL 174  --    COLOR UA   --  Yellow   CLARITY UA   --  Slightly Cloudy*   SPEC GRAV UA   --  1 015   PH UA   --  8 0   LEUKOCYTES UA   --  100 0*   NITRITE UA   --  Negative   GLUCOSE UA mg/dl  --  Negative   KETONES UA mg/dl  --  Negative   BILIRUBIN UA   --  Negative   UROBILINOGEN UA mg/dL  --  Negative   BLOOD UA   --  Negative   RBC UA /hpf  --  None Seen   WBC UA /hpf  --  2-4*   EPITHELIAL CELLS WET PREP /hpf  --  Moderate*   BACTERIA UA /hpf  --  Occasional         Current Facility-Administered Medications:  acetaminophen 325 mg Oral Q6H PRN Vincent Sides, MD   acetaminophen 650 mg Oral Q6H PRN Vincent Sides, MD   acetaminophen 650 mg Oral Q8H PRN Hulbert Sides, MD   albuterol 2 puff Inhalation Q4H PRN Hulbert Sides, MD   aluminum-magnesium hydroxide-simethicone 15 mL Oral Q4H PRN Vincent Sides, MD   ammonium lactate 1 application Topical BID PRN Vincent Sides, MD   ARIPiprazole 2 mg Oral Daily Vincent Sides, MD   benzonatate 100 mg Oral TID PRN Hulbert Sides, MD   benztropine 1 mg Intramuscular Q8H PRN Vincent Sides, MD   carbamide peroxide 5 drop Left Ear BID PRN Hulbert Sides, MD   cloZAPine 25 mg Oral BID Vincent Sides, MD   cloZAPine 50 mg Oral BID Vincent Sides, MD   docusate sodium 100 mg Oral BID PRN Vincent Sides, MD   EPINEPHrine PF 0 15 mg Intramuscular Once PRN Hulbert Sides, MD   fluticasone-vilanterol 1 puff Inhalation Daily Vincent Sides, MD   ketotifen 1 drop Right Eye BID PRN Vincent Sides, MD   levothyroxine 125 mcg Oral Early Morning Vincent Sides, MD   magnesium hydroxide 30 mL Oral Daily PRN Vincent Olivares MD   montelukast 10 mg Oral HS Vincent Olivares MD OLANZapine 5 mg Intramuscular Q8H PRN Jill Gayle MD   OLANZapine 5 mg Oral Q8H PRN Jill Gayle MD   ondansetron 4 mg Oral Q6H PRN Jill Gayle MD   pantoprazole 40 mg Oral Early Morning Jill Gayle MD   polyethylene glycol 17 g Oral Daily PRN Jill Gayle MD   polyvinyl alcohol 1 drop Both Eyes Q3H PRN Jill Gayle MD   sertraline 175 mg Oral Daily Jill Gayle MD   sucralfate 1,000 mg Oral BID Jill Gayle MD   theophylline 200 mg Oral Daily Jill Gayle MD   tiotropium 18 mcg Inhalation Daily Jill Gayle MD   traZODone 25 mg Oral HS PRN Jill Gayle MD       Counseling / Coordination of Care: Total floor / unit time spent today 15 minutes  Greater than 50% of total time was spent with the patient and / or family counseling and / or somewhat receptive to supportive listening and teaching positive coping skills to deal with symptom mangement  Patient's Rights, confidentiality and exceptions to confidentiality, use of automated medical record, [facility]eline staff access to medical record, and consent to treatment reviewed

## 2020-05-15 NOTE — PROGRESS NOTES
05/15/20 0900   Activity/Group Checklist   Group Other (Comment)  (Morning Walk)   Attendance Did not attend     Patient was encouraged to attend Morning Walk, but declined  She was sitting up in bed when prompted

## 2020-05-16 NOTE — PROGRESS NOTES
Sleeping with no distress noted  O2 on via NC, 1L  All precautions in place and maintained   Monitoring continues

## 2020-05-16 NOTE — PLAN OF CARE
Problem: Alteration in Thoughts and Perception  Goal: Verbalize thoughts and feelings  Description  Interventions:  - Promote a nonjudgmental and trusting relationship with the patient through active listening and therapeutic communication  - Assess patient's level of functioning, behavior and potential for risk  - Engage patient in 1 on 1 interactions for a minimum of 15 minutes each session  - Encourage patient to express fears, feelings, frustrations, and discuss symptoms    - [place] patient to reality, help patient recognize reality-based thinking   - Administer medications as ordered and assess for potential side effects  - Provide the patient education related to the signs and symptoms of the illness and desired effects of prescribed medications  Outcome: Progressing     Problem: Ineffective Coping  Goal: Patient/Family verbalizes awareness of resources  Outcome: Progressing  Goal: Understands least restrictive measures  Description  Interventions:  - Utilize least restrictive behavior  Outcome: Progressing     Problem: Risk for Self Injury/Neglect  Goal: Treatment Goal: Remain safe during length of stay, learn and adopt new coping skills, and be free of self-injurious ideation, impulses and acts at the time of discharge  Outcome: Progressing  Goal: Verbalize thoughts and feelings  Description  Interventions:  - Assess and re-assess patient's lethality and potential for self-injury  - Engage patient in 1:1 interactions, daily, for a minimum of 15 minutes  - Encourage patient to express feelings, fears, frustrations, hopes  - Establish rapport/trust with patient   Outcome: Progressing  Goal: Refrain from harming self  Description  Interventions:  - Monitor patient closely, per order  - Develop a trusting relationship  - Supervise medication ingestion, monitor effects and side effects   Outcome: Progressing     Problem: Depression  Goal: Verbalize thoughts and feelings  Description  Interventions:  - Assess and re-assess patient's level of risk   - Engage patient in 1:1 interactions, daily, for a minimum of 15 minutes   - Encourage patient to express feelings, fears, frustrations, hopes   Outcome: Progressing     Problem: Anxiety  Goal: Anxiety is at manageable level  Description  Interventions:  - Assess and monitor patient's anxiety level  - Monitor for signs and symptoms of anxiety both physical and emotional (heart palpitations, chest pain, shortness of breath, headaches, nausea, feeling jumpy, restlessness, irritable, apprehensive)  - Collaborate with interdisciplinary team and initiate plan and interventions as ordered    - [place] patient to unit/surroundings  - Explain treatment plan  - Encourage participation in care  - Encourage verbalization of concerns/fears  - Identify coping mechanisms  - Assist in developing anxiety-reducing skills  - Administer/offer alternative therapies  - Limit or eliminate stimulants  Outcome: Progressing     Problem: Alteration in Orientation  Goal: Interact with staff daily  Description  Interventions:  - Assess and re-assess patient's level of orientation  - Engage patient in 1 on 1 interactions, daily, for a minimum of 15 minutes   - Establish rapport/trust with patient   Outcome: Progressing     Problem: PAIN - ADULT  Goal: Verbalizes/displays adequate comfort level or baseline comfort level  Description  Interventions:  - Encourage patient to monitor pain and request assistance  - Assess pain using appropriate pain scale  - Administer analgesics based on type and severity of pain and evaluate response  - Implement non-pharmacological measures as appropriate and evaluate response  - Consider cultural and social influences on pain and pain management  - Notify physician/advanced practitioner if interventions unsuccessful or patient reports new pain  Outcome: Progressing     Problem: SAFETY ADULT  Goal: Patient will remain free of falls  Description  INTERVENTIONS:  - Assess patient frequently for physical needs  -  Identify cognitive and physical deficits and behaviors that affect risk of falls  -  [place] fall precautions as indicated by assessment   - Educate patient/family on patient safety including physical limitations  - Instruct patient to call for assistance with activity based on assessment  - Modify environment to reduce risk of injury  - Consider OT/PT consult to assist with strengthening/mobility  Outcome: Progressing     Problem: Nutrition/Hydration-ADULT  Goal: Nutrient/Hydration intake appropriate for improving, restoring or maintaining nutritional needs  Description  Monitor and assess patient's nutrition/hydration status for malnutrition  Collaborate with interdisciplinary team and initiate plan and interventions as ordered  Monitor patient's weight and dietary intake as ordered or per policy  Utilize nutrition screening tool and intervene as necessary  Determine patient's food preferences and provide high-protein, high-caloric foods as appropriate       INTERVENTIONS:  - Monitor oral intake, urinary output, labs, and treatment plans  - Assess nutrition and hydration status and recommend course of action  - Evaluate amount of meals eaten  - Assist patient with eating if necessary   - Allow adequate time for meals  - Recommend/ encourage appropriate diets, oral nutritional supplements, and vitamin/mineral supplements  - Order, calculate, and assess calorie counts as needed  - Recommend, monitor, and adjust tube feedings and TPN/PPN based on assessed needs  - Assess need for intravenous fluids  - Provide specific nutrition/hydration education as appropriate  - Include patient/family/caregiver in decisions related to nutrition  Outcome: Progressing     Problem: Alteration in Thoughts and Perception  Goal: Treatment Goal: Gain control of psychotic behaviors/thinking, reduce/eliminate presenting symptoms and demonstrate improved reality functioning upon discharge  Outcome: Not Progressing  Goal: Agree to be compliant with medication regime, as prescribed and report medication side effects  Description  Interventions:  - Offer appropriate PRN medication and supervise ingestion; conduct aims, as needed   Outcome: Not Progressing  Goal: Attend and participate in unit activities, including therapeutic, recreational, and educational groups  Description  Interventions:  - Provide therapeutic and educational activities daily, encourage attendance and participation, and document same in the medical record     CERTIFIED PEER SPECIALIST INTERVENTIONS:    Complete peer assessment with patient to assess their needs and identify their goals to complete while in the recovery program as well as once discharged into the community  Patient will complete WRAP Plan, Crisis Plan and 5 Life Domains  Patient will attend 50% of groups offered on the unit  Patient will complete a goal card weekly      Outcome: Not Progressing  Goal: Recognize dysfunctional thoughts, communicate reality-based thoughts at the time of discharge  Description  Interventions:  - Provide medication and psycho-education to assist patient in compliance and developing insight into his/her illness   Outcome: Not Progressing  Goal: Complete daily ADLs, including personal hygiene independently, as able  Description  Interventions:  - Observe, teach, and assist patient with ADLS  - Monitor and promote a balance of rest/activity, with adequate nutrition and elimination   Outcome: Not Progressing     Problem: Ineffective Coping  Goal: Participates in unit activities  Description  Interventions:  - Provide therapeutic environment   - Provide required programming   - Redirect inappropriate behaviors   Outcome: Not Progressing     Problem: Risk for Self Injury/Neglect  Goal: Attend and participate in unit activities, including therapeutic, recreational, and educational groups  Description  Interventions:  - Provide therapeutic and educational activities daily, encourage attendance and participation, and document same in the medical record  - Obtain collateral information, encourage visitation and family involvement in care   Outcome: Not Progressing  Goal: Recognize maladaptive responses and adopt new coping mechanisms  Outcome: Not Progressing  Goal: Complete daily ADLs, including personal hygiene independently, as able  Description  Interventions:  - Observe, teach, and assist patient with ADLS  - Monitor and promote a balance of rest/activity, with adequate nutrition and elimination  Outcome: Not Progressing     Problem: Depression  Goal: Treatment Goal: Demonstrate behavioral control of depressive symptoms, verbalize feelings of improved mood/affect, and adopt new coping skills prior to discharge  Outcome: Not Progressing  Goal: Refrain from isolation  Description  Interventions:  - Develop a trusting relationship   - Encourage socialization   Outcome: Not Progressing  Goal: Refrain from self-neglect  Outcome: Not Progressing   Mostly isolative to her room coming out for meds and meals only  Did not attend any groups  Did not come for her bin announced, last shower was on 5/5, appears disheveled and unkempt  Ate 50% of breakfast and 100% of lunch but had only ordered a small cup of potato salad for lunch  Refused her Abilify and when this writer attempted to ascertain why she would not take it she responded saying that it was "bad" and would likely "kill" her  When this writer attempted to further discuss with and educate the patient, she became defensive, dismissive, and argumentative"You don't know what you're talking about, you're not a doctor", then walked away  No other behaviors or issues noted  Continue to monitor

## 2020-05-16 NOTE — PLAN OF CARE
Problem: Alteration in Thoughts and Perception  Goal: Verbalize thoughts and feelings  Description  Interventions:  - Promote a nonjudgmental and trusting relationship with the patient through active listening and therapeutic communication  - Assess patient's level of functioning, behavior and potential for risk  - Engage patient in 1 on 1 interactions for a minimum of 15 minutes each session  - Encourage patient to express fears, feelings, frustrations, and discuss symptoms    - [place] patient to reality, help patient recognize reality-based thinking   - Administer medications as ordered and assess for potential side effects  - Provide the patient education related to the signs and symptoms of the illness and desired effects of prescribed medications  Outcome: Progressing  Goal: Agree to be compliant with medication regime, as prescribed and report medication side effects  Description  Interventions:  - Offer appropriate PRN medication and supervise ingestion; conduct aims, as needed   Outcome: Progressing     Problem: Alteration in Thoughts and Perception  Goal: Attend and participate in unit activities, including therapeutic, recreational, and educational groups  Description  Interventions:  - Provide therapeutic and educational activities daily, encourage attendance and participation, and document same in the medical record     CERTIFIED PEER SPECIALIST INTERVENTIONS:    Complete peer assessment with patient to assess their needs and identify their goals to complete while in the recovery program as well as once discharged into the community  Patient will complete WRAP Plan, Crisis Plan and 5 Life Domains  Patient will attend 50% of groups offered on the unit  Patient will complete a goal card weekly      Outcome: Not Progressing  Goal: Complete daily ADLs, including personal hygiene independently, as able  Description  Interventions:  - Observe, teach, and assist patient with ADLS  - Monitor and promote a balance of rest/activity, with adequate nutrition and elimination   Outcome: Not Progressing     Problem: Depression  Goal: Refrain from isolation  Description  Interventions:  - Develop a trusting relationship   - Encourage socialization   Outcome: Not Progressing     Problem: Nutrition/Hydration-ADULT  Goal: Nutrient/Hydration intake appropriate for improving, restoring or maintaining nutritional needs  Description  Monitor and assess patient's nutrition/hydration status for malnutrition  Collaborate with interdisciplinary team and initiate plan and interventions as ordered  Monitor patient's weight and dietary intake as ordered or per policy  Utilize nutrition screening tool and intervene as necessary  Determine patient's food preferences and provide high-protein, high-caloric foods as appropriate  INTERVENTIONS:  - Monitor oral intake, urinary output, labs, and treatment plans  - Assess nutrition and hydration status and recommend course of action  - Evaluate amount of meals eaten  - Assist patient with eating if necessary   - Allow adequate time for meals  - Recommend/ encourage appropriate diets, oral nutritional supplements, and vitamin/mineral supplements  - Order, calculate, and assess calorie counts as needed  - Recommend, monitor, and adjust tube feedings and TPN/PPN based on assessed needs  - Assess need for intravenous fluids  - Provide specific nutrition/hydration education as appropriate  - Include patient/family/caregiver in decisions related to nutrition  Outcome: Not Progressing     2000 Yoanna Higginbotham does not want to take the newly ordered Abilify  "Two (types) pills is enough " Has concerns will cause her more detriment than good  Teaching done that it enhances effectiveness of her other medicine, such as the antidepressant & may assist w/the symptom she describes of hearing her name called    She elaborates that she hears also in this bedroom someone she doesn't know named "Claude Barba" who she feels is working to turn the staff against her  Is NOT receptive to taking the Abilify as believe is will kill her due to it's side effects  "It's very dangerous for someone like me " She continues to refuse to address hygiene; 10days since last shower & very unkempt  Took supper medicine, but, refused meal, drinking only the Ensure  Spends her free time in room in bed  Has not responded to invitation to PM Group

## 2020-05-16 NOTE — PROGRESS NOTES
[facility] refused the [employer]  "Too tired " She did have an HS snack, took her HS medicine except the Singulair  Wearing now her QHS humidified nasal O2 @ 1L for bed

## 2020-05-17 NOTE — PLAN OF CARE
Problem: Alteration in Thoughts and Perception  Goal: Verbalize thoughts and feelings  Description  Interventions:  - Promote a nonjudgmental and trusting relationship with the patient through active listening and therapeutic communication  - Assess patient's level of functioning, behavior and potential for risk  - Engage patient in 1 on 1 interactions for a minimum of 15 minutes each session  - Encourage patient to express fears, feelings, frustrations, and discuss symptoms    - [place] patient to reality, help patient recognize reality-based thinking   - Administer medications as ordered and assess for potential side effects  - Provide the patient education related to the signs and symptoms of the illness and desired effects of prescribed medications  Outcome: Progressing  Goal: Agree to be compliant with medication regime, as prescribed and report medication side effects  Description  Interventions:  - Offer appropriate PRN medication and supervise ingestion; conduct aims, as needed   Outcome: Progressing     Problem: Ineffective Coping  Goal: Patient/Family verbalizes awareness of resources  Outcome: Progressing  Goal: Understands least restrictive measures  Description  Interventions:  - Utilize least restrictive behavior  Outcome: Progressing     Problem: Risk for Self Injury/Neglect  Goal: Treatment Goal: Remain safe during length of stay, learn and adopt new coping skills, and be free of self-injurious ideation, impulses and acts at the time of discharge  Outcome: Progressing  Goal: Verbalize thoughts and feelings  Description  Interventions:  - Assess and re-assess patient's lethality and potential for self-injury  - Engage patient in 1:1 interactions, daily, for a minimum of 15 minutes  - Encourage patient to express feelings, fears, frustrations, hopes  - Establish rapport/trust with patient   Outcome: Progressing  Goal: Refrain from harming self  Description  Interventions:  - Monitor patient closely, per order  - Develop a trusting relationship  - Supervise medication ingestion, monitor effects and side effects   Outcome: Progressing     Problem: Depression  Goal: Verbalize thoughts and feelings  Description  Interventions:  - Assess and re-assess patient's level of risk   - Engage patient in 1:1 interactions, daily, for a minimum of 15 minutes   - Encourage patient to express feelings, fears, frustrations, hopes   Outcome: Progressing     Problem: Anxiety  Goal: Anxiety is at manageable level  Description  Interventions:  - Assess and monitor patient's anxiety level  - Monitor for signs and symptoms of anxiety both physical and emotional (heart palpitations, chest pain, shortness of breath, headaches, nausea, feeling jumpy, restlessness, irritable, apprehensive)  - Collaborate with interdisciplinary team and initiate plan and interventions as ordered    - [place] patient to unit/surroundings  - Explain treatment plan  - Encourage participation in care  - Encourage verbalization of concerns/fears  - Identify coping mechanisms  - Assist in developing anxiety-reducing skills  - Administer/offer alternative therapies  - Limit or eliminate stimulants  Outcome: Progressing     Problem: Alteration in Orientation  Goal: Interact with staff daily  Description  Interventions:  - Assess and re-assess patient's level of orientation  - Engage patient in 1 on 1 interactions, daily, for a minimum of 15 minutes   - Establish rapport/trust with patient   Outcome: Progressing     Problem: PAIN - ADULT  Goal: Verbalizes/displays adequate comfort level or baseline comfort level  Description  Interventions:  - Encourage patient to monitor pain and request assistance  - Assess pain using appropriate pain scale  - Administer analgesics based on type and severity of pain and evaluate response  - Implement non-pharmacological measures as appropriate and evaluate response  - Consider cultural and social influences on pain and pain management  - Notify physician/advanced practitioner if interventions unsuccessful or patient reports new pain  Outcome: Progressing     Problem: Alteration in Thoughts and Perception  Goal: Treatment Goal: Gain control of psychotic behaviors/thinking, reduce/eliminate presenting symptoms and demonstrate improved reality functioning upon discharge  Outcome: Not Progressing  Goal: Attend and participate in unit activities, including therapeutic, recreational, and educational groups  Description  Interventions:  - Provide therapeutic and educational activities daily, encourage attendance and participation, and document same in the medical record     CERTIFIED PEER SPECIALIST INTERVENTIONS:    Complete peer assessment with patient to assess their needs and identify their goals to complete while in the recovery program as well as once discharged into the community  Patient will complete WRAP Plan, Crisis Plan and 5 Life Domains  Patient will attend 50% of groups offered on the unit  Patient will complete a goal card weekly      Outcome: Not Progressing  Goal: Recognize dysfunctional thoughts, communicate reality-based thoughts at the time of discharge  Description  Interventions:  - Provide medication and psycho-education to assist patient in compliance and developing insight into his/her illness   Outcome: Not Progressing  Goal: Complete daily ADLs, including personal hygiene independently, as able  Description  Interventions:  - Observe, teach, and assist patient with ADLS  - Monitor and promote a balance of rest/activity, with adequate nutrition and elimination   Outcome: Not Progressing     Problem: Ineffective Coping  Goal: Participates in unit activities  Description  Interventions:  - Provide therapeutic environment   - Provide required programming   - Redirect inappropriate behaviors   Outcome: Not Progressing     Problem: Risk for Self Injury/Neglect  Goal: Attend and participate in unit activities, including therapeutic, recreational, and educational groups  Description  Interventions:  - Provide therapeutic and educational activities daily, encourage attendance and participation, and document same in the medical record  - Obtain collateral information, encourage visitation and family involvement in care   Outcome: Not Progressing  Goal: Recognize maladaptive responses and adopt new coping mechanisms  Outcome: Not Progressing  Goal: Complete daily ADLs, including personal hygiene independently, as able  Description  Interventions:  - Observe, teach, and assist patient with ADLS  - Monitor and promote a balance of rest/activity, with adequate nutrition and elimination  Outcome: Not Progressing     Problem: Depression  Goal: Treatment Goal: Demonstrate behavioral control of depressive symptoms, verbalize feelings of improved mood/affect, and adopt new coping skills prior to discharge  Outcome: Not Progressing  Goal: Refrain from isolation  Description  Interventions:  - Develop a trusting relationship   - Encourage socialization   Outcome: Not Progressing  Goal: Refrain from self-neglect  Outcome: Not Progressing     Problem: Nutrition/Hydration-ADULT  Goal: Nutrient/Hydration intake appropriate for improving, restoring or maintaining nutritional needs  Description  Monitor and assess patient's nutrition/hydration status for malnutrition  Collaborate with interdisciplinary team and initiate plan and interventions as ordered  Monitor patient's weight and dietary intake as ordered or per policy  Utilize nutrition screening tool and intervene as necessary  Determine patient's food preferences and provide high-protein, high-caloric foods as appropriate       INTERVENTIONS:  - Monitor oral intake, urinary output, labs, and treatment plans  - Assess nutrition and hydration status and recommend course of action  - Evaluate amount of meals eaten  - Assist patient with eating if necessary   - Allow adequate time for meals  - Recommend/ encourage appropriate diets, oral nutritional supplements, and vitamin/mineral supplements  - Order, calculate, and assess calorie counts as needed  - Recommend, monitor, and adjust tube feedings and TPN/PPN based on assessed needs  - Assess need for intravenous fluids  - Provide specific nutrition/hydration education as appropriate  - Include patient/family/caregiver in decisions related to nutrition  Outcome: Not Progressing   Mostly isolative to her bed in her room, out for meds and meals, did not attend groups  Ate 50% of breakfast and 25% of lunch  Continues to refuse scheduled Abilify despite education and reassurance from staff and PA  Believes it will kill her  Needed to be redirected this morning when engaging in behaviors that were feeding into another patient's already escalating behaviors  Educated patient on importance of maintaining safety and focusing on her own recovery process  Patient acknowledged  No other behaviors or issues noted  Continue to monitor

## 2020-05-17 NOTE — PLAN OF CARE
Problem: Alteration in Thoughts and Perception  Goal: Verbalize thoughts and feelings  Description  Interventions:  - Promote a nonjudgmental and trusting relationship with the patient through active listening and therapeutic communication  - Assess patient's level of functioning, behavior and potential for risk  - Engage patient in 1 on 1 interactions for a minimum of 15 minutes each session  - Encourage patient to express fears, feelings, frustrations, and discuss symptoms    - [place] patient to reality, help patient recognize reality-based thinking   - Administer medications as ordered and assess for potential side effects  - Provide the patient education related to the signs and symptoms of the illness and desired effects of prescribed medications  Outcome: Progressing     Problem: Alteration in Orientation  Goal: Interact with staff daily  Description  Interventions:  - Assess and re-assess patient's level of orientation  - Engage patient in 1 on 1 interactions, daily, for a minimum of 15 minutes   - Establish rapport/trust with patient   Outcome: Progressing     Problem: SAFETY ADULT  Goal: Patient will remain free of falls  Description  INTERVENTIONS:  - Assess patient frequently for physical needs  -  Identify cognitive and physical deficits and behaviors that affect risk of falls    -  [place] fall precautions as indicated by assessment   - Educate patient/family on patient safety including physical limitations  - Instruct patient to call for assistance with activity based on assessment  - Modify environment to reduce risk of injury  - Consider OT/PT consult to assist with strengthening/mobility  Outcome: Progressing     Problem: Alteration in Thoughts and Perception  Goal: Attend and participate in unit activities, including therapeutic, recreational, and educational groups  Description  Interventions:  - Provide therapeutic and educational activities daily, encourage attendance and participation, and document same in the medical record     CERTIFIED PEER SPECIALIST INTERVENTIONS:    Complete peer assessment with patient to assess their needs and identify their goals to complete while in the recovery program as well as once discharged into the community  Patient will complete WRAP Plan, Crisis Plan and 5 Life Domains  Patient will attend 50% of groups offered on the unit  Patient will complete a goal card weekly  Outcome: Not Progressing  Goal: Complete daily ADLs, including personal hygiene independently, as able  Description  Interventions:  - Observe, teach, and assist patient with ADLS  - Monitor and promote a balance of rest/activity, with adequate nutrition and elimination   Outcome: Not Progressing     Problem: Depression  Goal: Refrain from isolation  Description  Interventions:  - Develop a trusting relationship   - Encourage socialization   Outcome: Not Progressing  Goal: Refrain from self-neglect  Outcome: Not Debborcayla Arredondo has been isolative to her room and self a majority of the shift  Only came out when prompted for medication and meal/snack  Likes to lay in bed and naps at times  Took medication and ate without swallowing issue  Ate 100% of her meal and drank Ensure  Has not been somatic  Did not attend evening group  Spoke briefly to her sister on the phone  Took HS medication  Ate snack  Oxygen saturation 91% prior to humidified oxygen at 1 liter applied  Continue to monitor  Precautions maintained

## 2020-05-17 NOTE — PROGRESS NOTES
Sleeping, no s/s of distress  O2 on at 1L via NC, safe precautions in place and maintained   Monitoring continues

## 2020-05-18 NOTE — CASE MANAGEMENT
CM spoke with patient regarding her group participation  Patient reports several somatic complaints as to why she can not go  Patient reports that she is "doing just fine " CM explained that groups are part of her programming here and she needs to start doing these things so a discharge can occur  CM will continue to follow and provide services as needed

## 2020-05-18 NOTE — PLAN OF CARE
Problem: DISCHARGE PLANNING  Goal: Discharge to home or other facility with appropriate resources  Description  INTERVENTIONS:  - Conduct assessment to determine patient/family and health care team treatment goals, and need for post-acute services based on payer coverage, community resources, and patient preferences, and barriers to discharge  - Address psychosocial, clinical, and financial barriers to discharge as identified in assessment in conjunction with the patient/family and health care team  - Assist the patient in reintegration back into the community by removing barriers which may hinder a successful discharge once deemed stable  - Arrange appropriate level of post-acute services according to patient's needs and preference and payer coverage in collaboration with the physician and health care team  - Communicate with and update the patient/family, physician, and health care team regarding progress on the discharge plan  - Arrange appropriate transportation to post-acute venues    Outcome: Progressing    ACT Team: [facility]  Discharge disposition: [facility]  Discharge date: TBD due to [place]in[place]

## 2020-05-18 NOTE — PROGRESS NOTES
Initial Treatment Date: 03/07/2017  Occupation: Retired factory work  Referred by:Sheyla Murry MD  Primary Care Physician: Sheyla Murry MD  Date informed consent signed:  03/06/2017  Visit Number of Current Episode: 1  Length of Visit: 45 min.      Subjective: Stephanie is a new patient that is a 66 year old female, retired , who presents today for evaluation and treatment of lower back pain started about 3 years ago and has been fluctuating ever since but getting noticeably worse over the past couple weeks. The lower back pain is described as constant, moderate to severe, burning, pressure, achy, stiff with no radiating pain into lower extremities. Laying on heating pad temporarily decreases pain. Standing too long, doing dishes and extending or flexing the back increases pain. Pain level is fluctuating between 5/10 on a good day and 10/10. No previous chiropractic care. Patient recently underwent about 6 weeks of physical therapy but denies any significant improvement.     Objective:    PHYSICAL EXAM    Deep Tendon Reflex(s):  +2/5 at the L4 and S1 dermatome levels bilaterally.    Muscle strength:  Strong and rated at +5/5 bilaterally in the lower extremities and the patient is able to perform a normal heel and toe walk.    Neurologic sensations:   (Sharp/Dull, Light Touch)  All sensations bilaterally intact in the lower extremities.    Pulses  Post. Tibial: Easily palpable  Dorsal Pedis: Easily palpable    Passive range of motion-lumbar spine is severely limited in forward flexion and extension and moderately in both left and right lateral flexion.    Objective findings   Moderate to severe palpatory tenderness with guarding from the patient is noted over the lower thoracic, bilateral lumbar and bilateral sacroiliac joints at the PSIS level. Moderate to severe grade joint restrictions are noted at the following levels: T9-L2, L4-S1 and at the bilateral sacroiliac joints. Muscle hypertonicity  Maggie maintained on ongoing fall and SAFE precaution  Tre Hartman in bed with eyes closed, breath even and unlabored   On O2 with humidifier @1L/m via nasal cannula  Continues rounding implemented   No somatic complaint overnight  No PRN needed for sleep aid   No indication of pain or discomfort   Will continue to monitor is graded at 3-4 or moderate to severe and is contributing to restricted mobility in the involved areas. These areas include the hamstring and gluteal musculature, thoracolumbar paraspinals and the anterior pelvic musculature.    Orthopedic tests:  Minor's Sign: Is present   Philippe's: Positive locally lumbar spine bilaterally  Erichson's: Positive bilaterally  SLR: Negative but tight hamstrings limits straight leg raise at 50°  Schepelmann's sign: Positive bilaterally    Review of systems:  General: Well-nourished, Well-groomed and Cooperative  Skin: Inspection of skin/SQ tissue wnl and Palpation of skin/SQ tissue wnl  Neuro: Cerebellar wnl  Psych: Judgement wnl, Recent/remote memory wnl and Mood/affect wnl    Postural remarks:  Reduced lumbar lordosis with posterior pelvic tilt and increased thoracic kyphosis.    Remarks:  Patient denies loss of bowel or bladder function.    Disability and pain score findings:   The Oswestry low back pain questionnaire is graded 66% and is a cripple rating category    Assessment:  Patient presents today with a multitude of joint restrictions/subluxations and muscle hypertonicity reducing mobility causing inflammation and pain. Patient denies any nerve symptomology into the lower extremities and is able to ambulate on her own power but does report moderate to severe pain. Patient tolerated the first treatment very well and reported reduced pain and improved mobility immediately after. I'm anticipating favorable improvements through a course of care as outlined in the treatment plan. I explained that soreness in the initial few treatments is normal and will likely improve as we move through the treatment plan.    Complicating Factors/Co-morbidities:   Smoking, a BMI over 36 and lack of any regular stretching or exercise.    Working diagnoses:      1. Facet syndrome, lumbar    2. Strain of thoracic region, initial encounter    3. SI (sacroiliac) joint dysfunction      Prognosis:    Fair/good as patient did respond well to the first treatment today but patient has failed physical therapy treatment and does have a multitude of complicating factors.    Informed consent: Patient read and verbally questioned me on both risks and benefits related to chiropractic manipulative therapy and adjunct of care. Patient gave verbal consent to treatment. Patient also gave written consent by signing the informed consent sheet.    Treatment today:   All joint restrictions in the thoracic, lumbar and bilateral sacroiliac joints were treated today and the exact levels are listed in the objective section. These areas were adjusted today utilizing a gentle diversified technique to correct aberrant joint function and reduce scar tissue formation. This procedure was done without incident at the site(s) of restriction.    Therapeutic exercises including post isometric relaxation stretching was performed to the following areas: Hamstring and gluteal musculature as well as the thoracolumbar paraspinals and anterior pelvic musculature to improve range of motion, strength, balance and coordination to the involved sites for 8-15 minutes. Patient tolerated procedure well.    Plan:  I'm recommending 2 treatments per week with chiropractic manipulative therapy for the next 4-8 weeks. I will reevaluate the patient after the first 6 treatments to assess for continued need and make every effort to reach our treatment goals and release the patient between 6 and 12 treatments total. Therapeutic exercises and passive modalities will be recommended throughout the treatment as clinically warranted as outlined in the therapy plan section.     THERAPY PLAN & GOALS  From Date:  March 6, 2017  Until Date: 4-8 weeks    CHIROPRACTIC MANIPULATIVE THERAPY and/ or [place] Flexion/distraction technique is recommended for 1-3 treatment per/week for 4-8 weeks to decrease scar tissue formation, restore joint function, decreased disc compression and  reduce pain and inflammation. RECOMMENDED LEVELS: 46627 or 3 regions              Passive modalities that may be utilized include: Interferential Current  at the low frequency setting for 15 minutes for 0-3 treatments per week for 4-8wks and/or HV/US Comb combination therapy at a 1MHz setting and at 2.0Wcm2 with the 100% duty cycle for 8 minutes at a rate of 0-3 treatments per week for 4-8wks as clinically warranted.    REGIONS TREATED WITH ABOVE MODALITIES INCLUDE:  Thoracic, lumbar and bilateral sacroiliac area to reduce pain/inflammation, decrease muscle hypertonicity, increase circulation, breakup adhesions and scar tissue and promote tissue healing.     Therapeutic exercises including PIR/PNF stretches will be performed to the following regions: Hamstring and gluteal musculature, thoracolumbar paraspinals and the anterior pelvic musculature to increase passive and active range of motion, improve flexibility and strength.     SHORT TERM GOALS    Decrease numerical pain score 100% from 5/10-10/10 to 0/10 for all area, Oswestry Low Back Pain Questionnaire from 66% to less than or equal to 5% and Increase PROM to WNL or best possible given pathologies    FUNCTIONAL GOALS:    Engage in normal daily and recreational activities without pain or difficulty.    Patient Instruction/Education:   Patient advised to utilize ice or cold compresses over the involved regions at home to help reduce pain and inflammation as needed. Patient stated understanding of, and was in agreement with, the discussed instructions.    Other treatment options discussed with patient:  Steroid injections and surgical consult if conservative therapy fails to control the condition adequately.

## 2020-05-18 NOTE — PLAN OF CARE
Problem: Alteration in Thoughts and Perception  Goal: Verbalize thoughts and feelings  Description  Interventions:  - Promote a nonjudgmental and trusting relationship with the patient through active listening and therapeutic communication  - Assess patient's level of functioning, behavior and potential for risk  - Engage patient in 1 on 1 interactions for a minimum of 15 minutes each session  - Encourage patient to express fears, feelings, frustrations, and discuss symptoms    - [place] patient to reality, help patient recognize reality-based thinking   - Administer medications as ordered and assess for potential side effects  - Provide the patient education related to the signs and symptoms of the illness and desired effects of prescribed medications  Outcome: Progressing     Problem: Ineffective Coping  Goal: Patient/Family verbalizes awareness of resources  Outcome: Progressing  Goal: Understands least restrictive measures  Description  Interventions:  - Utilize least restrictive behavior  Outcome: Progressing     Problem: Risk for Self Injury/Neglect  Goal: Treatment Goal: Remain safe during length of stay, learn and adopt new coping skills, and be free of self-injurious ideation, impulses and acts at the time of discharge  Outcome: Progressing  Goal: Verbalize thoughts and feelings  Description  Interventions:  - Assess and re-assess patient's lethality and potential for self-injury  - Engage patient in 1:1 interactions, daily, for a minimum of 15 minutes  - Encourage patient to express feelings, fears, frustrations, hopes  - Establish rapport/trust with patient   Outcome: Progressing  Goal: Refrain from harming self  Description  Interventions:  - Monitor patient closely, per order  - Develop a trusting relationship  - Supervise medication ingestion, monitor effects and side effects   Outcome: Progressing     Problem: Depression  Goal: Verbalize thoughts and feelings  Description  Interventions:  - Assess and re-assess patient's level of risk   - Engage patient in 1:1 interactions, daily, for a minimum of 15 minutes   - Encourage patient to express feelings, fears, frustrations, hopes   Outcome: Progressing     Problem: Anxiety  Goal: Anxiety is at manageable level  Description  Interventions:  - Assess and monitor patient's anxiety level  - Monitor for signs and symptoms of anxiety both physical and emotional (heart palpitations, chest pain, shortness of breath, headaches, nausea, feeling jumpy, restlessness, irritable, apprehensive)  - Collaborate with interdisciplinary team and initiate plan and interventions as ordered    - [place] patient to unit/surroundings  - Explain treatment plan  - Encourage participation in care  - Encourage verbalization of concerns/fears  - Identify coping mechanisms  - Assist in developing anxiety-reducing skills  - Administer/offer alternative therapies  - Limit or eliminate stimulants  Outcome: Progressing     Problem: Alteration in Orientation  Goal: Interact with staff daily  Description  Interventions:  - Assess and re-assess patient's level of orientation  - Engage patient in 1 on 1 interactions, daily, for a minimum of 15 minutes   - Establish rapport/trust with patient   Outcome: Progressing     Problem: PAIN - ADULT  Goal: Verbalizes/displays adequate comfort level or baseline comfort level  Description  Interventions:  - Encourage patient to monitor pain and request assistance  - Assess pain using appropriate pain scale  - Administer analgesics based on type and severity of pain and evaluate response  - Implement non-pharmacological measures as appropriate and evaluate response  - Consider cultural and social influences on pain and pain management  - Notify physician/advanced practitioner if interventions unsuccessful or patient reports new pain  Outcome: Progressing     Problem: SAFETY ADULT  Goal: Patient will remain free of falls  Description  INTERVENTIONS:  - Assess patient frequently for physical needs  -  Identify cognitive and physical deficits and behaviors that affect risk of falls  -  [place] fall precautions as indicated by assessment   - Educate patient/family on patient safety including physical limitations  - Instruct patient to call for assistance with activity based on assessment  - Modify environment to reduce risk of injury  - Consider OT/PT consult to assist with strengthening/mobility  Outcome: Progressing     Problem: Alteration in Thoughts and Perception  Goal: Treatment Goal: Gain control of psychotic behaviors/thinking, reduce/eliminate presenting symptoms and demonstrate improved reality functioning upon discharge  Outcome: Not Progressing  Goal: Agree to be compliant with medication regime, as prescribed and report medication side effects  Description  Interventions:  - Offer appropriate PRN medication and supervise ingestion; conduct aims, as needed   Outcome: Not Progressing  Goal: Attend and participate in unit activities, including therapeutic, recreational, and educational groups  Description  Interventions:  - Provide therapeutic and educational activities daily, encourage attendance and participation, and document same in the medical record     CERTIFIED PEER SPECIALIST INTERVENTIONS:    Complete peer assessment with patient to assess their needs and identify their goals to complete while in the recovery program as well as once discharged into the community  Patient will complete WRAP Plan, Crisis Plan and 5 Life Domains  Patient will attend 50% of groups offered on the unit  Patient will complete a goal card weekly      Outcome: Not Progressing  Goal: Recognize dysfunctional thoughts, communicate reality-based thoughts at the time of discharge  Description  Interventions:  - Provide medication and psycho-education to assist patient in compliance and developing insight into his/her illness   Outcome: Not Progressing  Goal: Complete daily ADLs, including personal hygiene independently, as able  Description  Interventions:  - Observe, teach, and assist patient with ADLS  - Monitor and promote a balance of rest/activity, with adequate nutrition and elimination   Outcome: Not Progressing     Problem: Ineffective Coping  Goal: Identifies ineffective coping skills  Outcome: Not Progressing  Goal: Identifies healthy coping skills  Outcome: Not Progressing  Goal: Demonstrates healthy coping skills  Outcome: Not Progressing  Goal: Participates in unit activities  Description  Interventions:  - Provide therapeutic environment   - Provide required programming   - Redirect inappropriate behaviors   Outcome: Not Progressing     Problem: Risk for Self Injury/Neglect  Goal: Attend and participate in unit activities, including therapeutic, recreational, and educational groups  Description  Interventions:  - Provide therapeutic and educational activities daily, encourage attendance and participation, and document same in the medical record  - Obtain collateral information, encourage visitation and family involvement in care   Outcome: Not Progressing  Goal: Recognize maladaptive responses and adopt new coping mechanisms  Outcome: Not Progressing  Goal: Complete daily ADLs, including personal hygiene independently, as able  Description  Interventions:  - Observe, teach, and assist patient with ADLS  - Monitor and promote a balance of rest/activity, with adequate nutrition and elimination  Outcome: Not Progressing     Problem: Depression  Goal: Treatment Goal: Demonstrate behavioral control of depressive symptoms, verbalize feelings of improved mood/affect, and adopt new coping skills prior to discharge  Outcome: Not Progressing  Goal: Refrain from isolation  Description  Interventions:  - Develop a trusting relationship   - Encourage socialization   Outcome: Not Progressing  Goal: Refrain from self-neglect  Outcome: Not Progressing     Problem: Nutrition/Hydration-ADULT  Goal: Nutrient/Hydration intake appropriate for improving, restoring or maintaining nutritional needs  Description  Monitor and assess patient's nutrition/hydration status for malnutrition  Collaborate with interdisciplinary team and initiate plan and interventions as ordered  Monitor patient's weight and dietary intake as ordered or per policy  Utilize nutrition screening tool and intervene as necessary  Determine patient's food preferences and provide high-protein, high-caloric foods as appropriate  INTERVENTIONS:  - Monitor oral intake, urinary output, labs, and treatment plans  - Assess nutrition and hydration status and recommend course of action  - Evaluate amount of meals eaten  - Assist patient with eating if necessary   - Allow adequate time for meals  - Recommend/ encourage appropriate diets, oral nutritional supplements, and vitamin/mineral supplements  - Order, calculate, and assess calorie counts as needed  - Recommend, monitor, and adjust tube feedings and TPN/PPN based on assessed needs  - Assess need for intravenous fluids  - Provide specific nutrition/hydration education as appropriate  - Include patient/family/caregiver in decisions related to nutrition  Outcome: Not Progressing   Mostly isolative to her bed in her room, out for meds and meals, did not attend groups  Ate 50% of breakfast, and a cup of milk and piece of cake for lunch  Continues to refuse scheduled Abilify, believing that it will kill her despite attempts from staff to educate and reassure her  No other behaviors or issues noted  Continue to monitor

## 2020-05-18 NOTE — PROGRESS NOTES
Agustin Edgar refused her Abilify 2 mg medication this morning but was compliant with the rest of her scheduled medications  Patient verbalized that she is fearful of the potential side effects of taking the medication  Pleasant otherwise  Will continue to monitor

## 2020-05-18 NOTE — TREATMENT TEAM
05/18/20 0830   Team Meeting   Meeting Type Daily Rounds   Team Members Present   Team Members Present Physician;Nurse;   Physician Team Member Dr Peter Anna Team Member Osmin Crawford, RN   Care Management Team Member ASHLYN Powers     Refusing to take routine Abilify, fearful " it will kill her"  Needed to be redirected for feeding into peer's negativity   Continues with poor hygiene, last showered 5/5/2020

## 2020-05-18 NOTE — PLAN OF CARE
Problem: Alteration in Thoughts and Perception  Goal: Verbalize thoughts and feelings  Description  Interventions:  - Promote a nonjudgmental and trusting relationship with the patient through active listening and therapeutic communication  - Assess patient's level of functioning, behavior and potential for risk  - Engage patient in 1 on 1 interactions for a minimum of 15 minutes each session  - Encourage patient to express fears, feelings, frustrations, and discuss symptoms    - [place] patient to reality, help patient recognize reality-based thinking   - Administer medications as ordered and assess for potential side effects  - Provide the patient education related to the signs and symptoms of the illness and desired effects of prescribed medications  Outcome: Progressing  Goal: Agree to be compliant with medication regime, as prescribed and report medication side effects  Description  Interventions:  - Offer appropriate PRN medication and supervise ingestion; conduct aims, as needed   Outcome: Progressing     Problem: Alteration in Thoughts and Perception  Goal: Attend and participate in unit activities, including therapeutic, recreational, and educational groups  Description  Interventions:  - Provide therapeutic and educational activities daily, encourage attendance and participation, and document same in the medical record     CERTIFIED PEER SPECIALIST INTERVENTIONS:    Complete peer assessment with patient to assess their needs and identify their goals to complete while in the recovery program as well as once discharged into the community  Patient will complete WRAP Plan, Crisis Plan and 5 Life Domains  Patient will attend 50% of groups offered on the unit  Patient will complete a goal card weekly      Outcome: Not Progressing  Goal: Complete daily ADLs, including personal hygiene independently, as able  Description  Interventions:  - Observe, teach, and assist patient with ADLS  - Monitor and promote a balance of rest/activity, with adequate nutrition and elimination   Outcome: Not Progressing     Problem: Depression  Goal: Refrain from isolation  Description  Interventions:  - Develop a trusting relationship   - Encourage socialization   Outcome: Not Progressing  Goal: Refrain from self-neglect  Outcome: Not Progressing     Problem: Nutrition/Hydration-ADULT  Goal: Nutrient/Hydration intake appropriate for improving, restoring or maintaining nutritional needs  Description  Monitor and assess patient's nutrition/hydration status for malnutrition  Collaborate with interdisciplinary team and initiate plan and interventions as ordered  Monitor patient's weight and dietary intake as ordered or per policy  Utilize nutrition screening tool and intervene as necessary  Determine patient's food preferences and provide high-protein, high-caloric foods as appropriate  INTERVENTIONS:  - Monitor oral intake, urinary output, labs, and treatment plans  - Assess nutrition and hydration status and recommend course of action  - Evaluate amount of meals eaten  - Assist patient with eating if necessary   - Allow adequate time for meals  - Recommend/ encourage appropriate diets, oral nutritional supplements, and vitamin/mineral supplements  - Order, calculate, and assess calorie counts as needed  - Recommend, monitor, and adjust tube feedings and TPN/PPN based on assessed needs  - Assess need for intravenous fluids  - Provide specific nutrition/hydration education as appropriate  - Include patient/family/caregiver in decisions related to nutrition  Outcome: Jannette Willis was anxious to shower, but, doing little to help herself such as come for her bin  This nurse de-tangled the matted section of her hair, the [facility] set her up, but, she rejected shower in her room saying the shower chair doesn't fit in shower stall of this new room, wobbles, unsafe, hurts her hip if chair turned sideways to fit stall  Staff [facility], but, she insisted needed staff present there the whole time to be safe which made for a delay to accommodate her  She ultimately vetoed showering today  Did change her clothes  Came up for supper medicine, ate 50% (crackers, 1/2 tuna salad) of meal, drank Ensure  Isolating now in room in bed asleep

## 2020-05-18 NOTE — PLAN OF CARE
Problem: Alteration in Thoughts and Perception  Goal: Verbalize thoughts and feelings  Description  Interventions:  - Promote a nonjudgmental and trusting relationship with the patient through active listening and therapeutic communication  - Assess patient's level of functioning, behavior and potential for risk  - Engage patient in 1 on 1 interactions for a minimum of 15 minutes each session  - Encourage patient to express fears, feelings, frustrations, and discuss symptoms    - [place] patient to reality, help patient recognize reality-based thinking   - Administer medications as ordered and assess for potential side effects  - Provide the patient education related to the signs and symptoms of the illness and desired effects of prescribed medications  Outcome: Progressing     Problem: Alteration in Thoughts and Perception  Goal: Attend and participate in unit activities, including therapeutic, recreational, and educational groups  Description  Interventions:  - Provide therapeutic and educational activities daily, encourage attendance and participation, and document same in the medical record     CERTIFIED PEER SPECIALIST INTERVENTIONS:    Complete peer assessment with patient to assess their needs and identify their goals to complete while in the recovery program as well as once discharged into the community  Patient will complete WRAP Plan, Crisis Plan and 5 Life Domains  Patient will attend 50% of groups offered on the unit  Patient will complete a goal card weekly      Outcome: Not Progressing  Goal: Complete daily ADLs, including personal hygiene independently, as able  Description  Interventions:  - Observe, teach, and assist patient with ADLS  - Monitor and promote a balance of rest/activity, with adequate nutrition and elimination   Outcome: Not Progressing     Ate 50% of dinner +ensure and had a snack before bed, isolative, needed prompting for routine medications, gait steady, denied pain, social with staff, limited interaction with peers, refused to shower today, preoccupied with somatic complaints, feeling "very tired" worried about recent medication changes, education and comfort measures attempted, will continue to monitor

## 2020-05-19 NOTE — PROGRESS NOTES
05/19/20 1245   Team Meeting   Meeting Type Tx Team Meeting   Initial Conference Date 05/19/20   Next Conference Date 05/26/20   Team Members Present   Team Members Present Physician;Nurse;;; Other (Discipline and Name)   Physician Team Member Dr Carli Ceja, RN   Care Management Team Member Ebony Cobian   Social Work Team Member Rhianna RANDLEW/Pyschotherapist   Other (Discipline and Name) Abelardo Rowe ; [facility] [address]   Patient/Family Present   Patient Present Yes   Patient's Family Present No     Patient attended treatment team meeting this morning prepared without self-assessment  Patient's   group attendance for last week was 03%  Team and patient completed risk assessment and the patient did not verbalize any desire to elope from the program  Patient verbalized understanding of consequences of eloping from treatment while on a commitment  Patient verbalized no further questions or concerns at the conclusion of the meeting  Next team meeting scheduled for 5/26/2020  Patient presented to treatment team more positive than yesterday  Patient reports that she did not shower yesterday due to things such as shower chair not fitting and have asthma  Patient questioned regarding lack of group attendance  Patient admits to just not going to groups and provided no reasoning  Patient states that she will attend at least [facility] and another group during each day for this week  Patient reviewed and signed 30 day renewal of treatment plan

## 2020-05-19 NOTE — PLAN OF CARE
Problem: Alteration in Thoughts and Perception  Goal: Treatment Goal: Gain control of psychotic behaviors/thinking, reduce/eliminate presenting symptoms and demonstrate improved reality functioning upon discharge  Outcome: Not Progressing  Goal: Verbalize thoughts and feelings  Description  Interventions:  - Promote a nonjudgmental and trusting relationship with the patient through active listening and therapeutic communication  - Assess patient's level of functioning, behavior and potential for risk  - Engage patient in 1 on 1 interactions for a minimum of 15 minutes each session  - Encourage patient to express fears, feelings, frustrations, and discuss symptoms    - [place] patient to reality, help patient recognize reality-based thinking   - Administer medications as ordered and assess for potential side effects  - Provide the patient education related to the signs and symptoms of the illness and desired effects of prescribed medications  Outcome: Not Progressing  Goal: Agree to be compliant with medication regime, as prescribed and report medication side effects  Description  Interventions:  - Offer appropriate PRN medication and supervise ingestion; conduct aims, as needed   Outcome: Not Progressing  Goal: Attend and participate in unit activities, including therapeutic, recreational, and educational groups  Description  Interventions:  - Provide therapeutic and educational activities daily, encourage attendance and participation, and document same in the medical record     CERTIFIED PEER SPECIALIST INTERVENTIONS:    Complete peer assessment with patient to assess their needs and identify their goals to complete while in the recovery program as well as once discharged into the community  Patient will complete WRAP Plan, Crisis Plan and 5 Life Domains  Patient will attend 50% of groups offered on the unit  Patient will complete a goal card weekly      Outcome: Not Progressing  Goal: Recognize dysfunctional thoughts, communicate reality-based thoughts at the time of discharge  Description  Interventions:  - Provide medication and psycho-education to assist patient in compliance and developing insight into his/her illness   Outcome: Not Progressing  Goal: Complete daily ADLs, including personal hygiene independently, as able  Description  Interventions:  - Observe, teach, and assist patient with ADLS  - Monitor and promote a balance of rest/activity, with adequate nutrition and elimination   Outcome: Not Progressing     Problem: Ineffective Coping  Goal: Identifies ineffective coping skills  Outcome: Not Progressing  Goal: Identifies healthy coping skills  Outcome: Not Progressing  Goal: Demonstrates healthy coping skills  Outcome: Not Progressing  Goal: Participates in unit activities  Description  Interventions:  - Provide therapeutic environment   - Provide required programming   - Redirect inappropriate behaviors   Outcome: Not Progressing  Goal: Patient/Family participate in treatment and DC plans  Description  Interventions:  - Provide therapeutic environment  Outcome: Not Progressing  Goal: Patient/Family verbalizes awareness of resources  Outcome: Not Progressing  Goal: Understands least restrictive measures  Description  Interventions:  - Utilize least restrictive behavior  Outcome: Not Progressing     Problem: Risk for Self Injury/Neglect  Goal: Treatment Goal: Remain safe during length of stay, learn and adopt new coping skills, and be free of self-injurious ideation, impulses and acts at the time of discharge  Outcome: Not Progressing  Goal: Verbalize thoughts and feelings  Description  Interventions:  - Assess and re-assess patient's lethality and potential for self-injury  - Engage patient in 1:1 interactions, daily, for a minimum of 15 minutes  - Encourage patient to express feelings, fears, frustrations, hopes  - Establish rapport/trust with patient   Outcome: Not Progressing  Goal: Refrain from harming self  Description  Interventions:  - Monitor patient closely, per order  - Develop a trusting relationship  - Supervise medication ingestion, monitor effects and side effects   Outcome: Not Progressing  Goal: Attend and participate in unit activities, including therapeutic, recreational, and educational groups  Description  Interventions:  - Provide therapeutic and educational activities daily, encourage attendance and participation, and document same in the medical record  - Obtain collateral information, encourage visitation and family involvement in care   Outcome: Not Progressing  Goal: Recognize maladaptive responses and adopt new coping mechanisms  Outcome: Not Progressing  Goal: Complete daily ADLs, including personal hygiene independently, as able  Description  Interventions:  - Observe, teach, and assist patient with ADLS  - Monitor and promote a balance of rest/activity, with adequate nutrition and elimination  Outcome: Not Progressing     Problem: Depression  Goal: Treatment Goal: Demonstrate behavioral control of depressive symptoms, verbalize feelings of improved mood/affect, and adopt new coping skills prior to discharge  Outcome: Not Progressing  Goal: Verbalize thoughts and feelings  Description  Interventions:  - Assess and re-assess patient's level of risk   - Engage patient in 1:1 interactions, daily, for a minimum of 15 minutes   - Encourage patient to express feelings, fears, frustrations, hopes   Outcome: Not Progressing  Goal: Refrain from isolation  Description  Interventions:  - Develop a trusting relationship   - Encourage socialization   Outcome: Not Progressing  Goal: Refrain from self-neglect  Outcome: Not Progressing     Problem: Anxiety  Goal: Anxiety is at manageable level  Description  Interventions:  - Assess and monitor patient's anxiety level     - Monitor for signs and symptoms of anxiety both physical and emotional (heart palpitations, chest pain, shortness of breath, headaches, nausea, feeling jumpy, restlessness, irritable, apprehensive)  - Collaborate with interdisciplinary team and initiate plan and interventions as ordered  - [place] patient to unit/surroundings  - Explain treatment plan  - Encourage participation in care  - Encourage verbalization of concerns/fears  - Identify coping mechanisms  - Assist in developing anxiety-reducing skills  - Administer/offer alternative therapies  - Limit or eliminate stimulants  Outcome: Not Progressing     Problem: Alteration in Orientation  Goal: Interact with staff daily  Description  Interventions:  - Assess and re-assess patient's level of orientation  - Engage patient in 1 on 1 interactions, daily, for a minimum of 15 minutes   - Establish rapport/trust with patient   Outcome: Not Progressing  Goal: Cooperate with recommended testing/procedures  Description  Interventions:  - Determine need for ancillary testing  - Observe for mental status changes  - Implement falls/precaution protocol   Outcome: Not Progressing     Problem: PAIN - ADULT  Goal: Verbalizes/displays adequate comfort level or baseline comfort level  Description  Interventions:  - Encourage patient to monitor pain and request assistance  - Assess pain using appropriate pain scale  - Administer analgesics based on type and severity of pain and evaluate response  - Implement non-pharmacological measures as appropriate and evaluate response  - Consider cultural and social influences on pain and pain management  - Notify physician/advanced practitioner if interventions unsuccessful or patient reports new pain  Outcome: Not Progressing     Problem: SAFETY ADULT  Goal: Patient will remain free of falls  Description  INTERVENTIONS:  - Assess patient frequently for physical needs  -  Identify cognitive and physical deficits and behaviors that affect risk of falls    -  [place] fall precautions as indicated by assessment   - Educate patient/family on patient safety including physical limitations  - Instruct patient to call for assistance with activity based on assessment  - Modify environment to reduce risk of injury  - Consider OT/PT consult to assist with strengthening/mobility  Outcome: Not Progressing     Problem: RESPIRATORY - ADULT  Goal: Achieves optimal ventilation and oxygenation  Description  INTERVENTIONS:  - Assess for changes in respiratory status  - Assess for changes in mentation and behavior  - Position to facilitate oxygenation and minimize respiratory effort  - Oxygen administration by appropriate delivery method based on oxygen saturation (per order) or ABGs  - Initiate smoking cessation education as indicated  - Encourage broncho-pulmonary hygiene including cough, deep breathe, Incentive Spirometry  - Assess the need for suctioning and aspirate as needed  - Assess and instruct to report SOB or any respiratory difficulty  - Respiratory Therapy support as indicated  Outcome: Not Progressing     Problem: DISCHARGE PLANNING  Goal: Discharge to home or other facility with appropriate resources  Description  INTERVENTIONS:  - Conduct assessment to determine patient/family and health care team treatment goals, and need for post-acute services based on payer coverage, community resources, and patient preferences, and barriers to discharge  - Address psychosocial, clinical, and financial barriers to discharge as identified in assessment in conjunction with the patient/family and health care team  - Assist the patient in reintegration back into the community by removing barriers which may hinder a successful discharge once deemed stable  - Arrange appropriate level of post-acute services according to patient's needs and preference and payer coverage in collaboration with the physician and health care team  - Communicate with and update the patient/family, physician, and health care team regarding progress on the discharge plan  - Arrange appropriate transportation to post-acute venues    Outcome: Not Progressing     Problem: SLEEP DISTURBANCE  Goal: Will exhibit normal sleeping pattern  Description  Interventions:  -  Assess the patients sleep pattern, noting recent changes  - Administer medication as ordered  - Decrease environmental stimuli, including noise, as appropriate during the night  - Encourage the patient to actively participate in unit groups and or exercise during the day to enhance ability to achieve adequate sleep at night  - Assess the patient, in the morning, encouraging a description of sleep experience  Outcome: Not Progressing     Problem: Nutrition/Hydration-ADULT  Goal: Nutrient/Hydration intake appropriate for improving, restoring or maintaining nutritional needs  Description  Monitor and assess patient's nutrition/hydration status for malnutrition  Collaborate with interdisciplinary team and initiate plan and interventions as ordered  Monitor patient's weight and dietary intake as ordered or per policy  Utilize nutrition screening tool and intervene as necessary  Determine patient's food preferences and provide high-protein, high-caloric foods as appropriate       INTERVENTIONS:  - Monitor oral intake, urinary output, labs, and treatment plans  - Assess nutrition and hydration status and recommend course of action  - Evaluate amount of meals eaten  - Assist patient with eating if necessary   - Allow adequate time for meals  - Recommend/ encourage appropriate diets, oral nutritional supplements, and vitamin/mineral supplements  - Order, calculate, and assess calorie counts as needed  - Recommend, monitor, and adjust tube feedings and TPN/PPN based on assessed needs  - Assess need for intravenous fluids  - Provide specific nutrition/hydration education as appropriate  - Include patient/family/caregiver in decisions related to nutrition  Outcome: Not Progressing     Problem: SKIN/TISSUE INTEGRITY - ADULT  Goal: Skin integrity remains intact  Description  INTERVENTIONS  - Identify patients at risk for skin breakdown  - Assess and monitor skin integrity  - Assess and monitor nutrition and hydration status  - Monitor labs (i e  albumin)  - Assess for incontinence   - Turn and reposition patient  - Assist with mobility/ambulation  - Relieve pressure over bony prominences  - Avoid friction and shearing  - Provide appropriate hygiene as needed including keeping skin clean and dry  - Evaluate need for skin moisturizer/barrier cream  - Collaborate with interdisciplinary team (i e  Nutrition, Rehabilitation, etc )   - Patient/family teaching  Outcome: Not Progressing  Goal: Incision(s), wounds(s) or drain site(s) healing without S/S of infection  Description  INTERVENTIONS  - Assess and document risk factors for skin impairment   - Assess and document dressing, incision, wound bed, drain sites and surrounding tissue  - Consider nutrition services referral as needed  - Oral mucous membranes remain intact  - Provide patient/ family education  Outcome: Not Progressing     Ambivalent, apathetic, isolative to room and self  Depressed and anxious  Denies SI and HI  Minimal food and fluid intake, 75% breakfast and no lunch  Refusing to eat because it will choke her  Refused to take Abilify  No Groups    Will continue to monitor progress in recovery program

## 2020-05-19 NOTE — PLAN OF CARE
Problem: Alteration in Thoughts and Perception  Goal: Agree to be compliant with medication regime, as prescribed and report medication side effects  Description  Interventions:  - Offer appropriate PRN medication and supervise ingestion; conduct aims, as needed   Outcome: Progressing     Problem: Alteration in Thoughts and Perception  Goal: Attend and participate in unit activities, including therapeutic, recreational, and educational groups  Description  Interventions:  - Provide therapeutic and educational activities daily, encourage attendance and participation, and document same in the medical record     CERTIFIED PEER SPECIALIST INTERVENTIONS:    Complete peer assessment with patient to assess their needs and identify their goals to complete while in the recovery program as well as once discharged into the community  Patient will complete WRAP Plan, Crisis Plan and 5 Life Domains  Patient will attend 50% of groups offered on the unit  Patient will complete a goal card weekly  Outcome: Not Progressing  Goal: Complete daily ADLs, including personal hygiene independently, as able  Description  Interventions:  - Observe, teach, and assist patient with ADLS  - Monitor and promote a balance of rest/activity, with adequate nutrition and elimination   Outcome: Not Progressing     Problem: Depression  Goal: Refrain from isolation  Description  Interventions:  - Develop a trusting relationship   - Encourage socialization   Outcome: Not Progressing     Problem: Nutrition/Hydration-ADULT  Goal: Nutrient/Hydration intake appropriate for improving, restoring or maintaining nutritional needs  Description  Monitor and assess patient's nutrition/hydration status for malnutrition  Collaborate with interdisciplinary team and initiate plan and interventions as ordered  Monitor patient's weight and dietary intake as ordered or per policy  Utilize nutrition screening tool and intervene as necessary   Determine patient's food preferences and provide high-protein, high-caloric foods as appropriate  INTERVENTIONS:  - Monitor oral intake, urinary output, labs, and treatment plans  - Assess nutrition and hydration status and recommend course of action  - Evaluate amount of meals eaten  - Assist patient with eating if necessary   - Allow adequate time for meals  - Recommend/ encourage appropriate diets, oral nutritional supplements, and vitamin/mineral supplements  - Order, calculate, and assess calorie counts as needed  - Recommend, monitor, and adjust tube feedings and TPN/PPN based on assessed needs  - Assess need for intravenous fluids  - Provide specific nutrition/hydration education as appropriate  - Include patient/family/caregiver in decisions related to nutrition  Outcome: Not Progressing     [address] isolates in room in bed asleep unless prompted out as for meal, supper medicine  She ate poorly, only 10% (juice, bites of mac & cheese), did drink her Ensure  She continues to take no initiative to shower or groom, 14 days since last done  She is pleasant, but, continues to throw up somatic road blocks as to why can't get underway w/such tasks as hygiene  Did not respond to invitation for optional [employer] group

## 2020-05-19 NOTE — PROGRESS NOTES
Psychiatry Progress Note [address] 60 y o  female MRN: [id number]  Unit/Bed#: [facility] 111-01 Encounter: [id number]  Code Status: Level 1 - Full Code    PCP: Richar Bass PA-C    Date of Admission:  7/23/2019 2481   Date of Service:  05/19/20  Patient Active Problem List   Diagnosis    COPD with asthma ([facility] [address] )    Tobacco use disorder, continuous    Compression fracture of L4 lumbar vertebra    Ventral hernia    Acute on chronic respiratory failure with hypoxia ([facility] [address] )    Schizoaffective disorder, bipolar type ([facility] [address] )    Acquired hypothyroidism    Gastroesophageal reflux disease without esophagitis    Abnormal CT of the chest    Excessive cerumen in left ear canal    Lipoma of right upper extremity    Noncompliant with deep vein thrombosis (DVT) prophylaxis    At risk for aspiration    Ear ache    Tachycardia    Chest pain    Low HDL (under 40)     Diagnosis schizoaffective bipolar  Assessment    Overall Status:  Still anxious, psychosomatic, not attending all groups and still with no shower for last 2 weeks, not taking abilify at all   Certification Statement to demonstrate a period of stability by attending to ADLs and becoming less psychosomatic in attending more groups Acceptance by patient:  Accepting  Radha Record in recovery:  Living at another personal care home   Understanding of medications: Yes    Involved in reintegration process: On hold due to [address] in relationship with psychiatrist:  Trusting sometimes    Medication changes   None today  Non-pharmacological treatments   Waiting for your culture and sensitivity report   Continue with individual, group, milieu and occupational therapy using recovery principles and psycho-education about accepting illness and the need for treatment   Encourage to take showers twice a week and cooperate with treatment and adl skills as per her behav   Plan   Therapeutic passes on hold due to covid 19 lock down   Prepare for the [facility] and encouraged to accept  rather than refuse it   Reminded to attend at least 25% of groups on a daily basis including [facility]    Safety   Safety and communication plan established to target dynamic risk factors discussed above  Discharge Plan   · back to a [facility]    Interval Progress   Patient lied about taking a shower over weekend and the staff did tell me that is not true  She then admitted that it is true that she did not take showers since 5/5/20 and tried to take one yetserday and then backed out claiming she had an anxiety attack  Still with psychosomatic complaints and still suspicious of the motives of staff and still examines her pills before taking them and still believes that staff is tampering with her oxygen concentrator and spirometer  She has been refusing Abilify as she thinks she is going to die from that  No longer complains of hearing voices  Remains poorly groomed and disheveled, suspicious and paranoid  Sleep:   Fair  Appetite:   Fair but she picks and chooses her food and now asking to see a dietitian  Compliance with medications:  Good  Side effects:   none but claims to feel tired sometimes  Review of systems:  Continues to have psychosomatic symptoms and with increased frequency of urination    Mental Status Exam  Appearance:    Attended team and continues to claim to feel too tired which is her usual complaint, poorly groomed Looks older than her stated age,and not very well kept, with uncombed hairt but  wearing clean clothing   Anxious preoccupied  Has good eye contact     Suspicious in her interaction  Behavior:    Superficially friendly pleasant but anxious suspicious and preoccupied  Speech:    tangential at times with tendency to become stilted   Mood:     anxious irritated times,    Affect:    increased in intensity range mood congruent redirectable  Thought Process:   Circumstantial with tendency to have stilted speech Thought Content:   Increasingly Paranoid about motives of staff switching her medications or tampering with her inhalant or her oxygen concentrator off and on  She also has to examine her pills literally before taking them  No preoccupation with violence or suicide  No current suicidal homicidal thoughts intent or plans reported  No phobias but has obsessions and compulsions about examining her pills before she takes them  Rich Simple Psychosomatic about dying by choking from food and from heart attack or from shortness of breath and now from catching Covid-19 which are all ongoing beliefs that she has  Tells me that she will try to take a shower attend  Perceptual Disturbances:  Claims to hear voices calling her name hand hearing conversations when no one is around Risk Potential:   Ability to care for herself and refusal to take showers  Sensorium:   fully oriented to place, person, time, date, day, month, year and to situation  Cognition:    Grossly intact, aware of current events like the corona virus situation, recent and remote memory intact     No language deficit  Consciousness:   Alert and awake  Attention and concentration:  fair  Intellect:    average  Insight:    limited  Judgment:    fair  Motor Activity:  No abnormal involuntary movement noted today    Vitals  Temp:  [97 1 °F (36 2 °C)-97 8 °F (36 6 °C)] 97 8 °F (36 6 °C)  HR:  [63-85] 85  Resp:  [14-18] 18  BP: ()/(60-73) 105/73  SpO2:  [93 %-94 %] 93 %  No intake or output data in the 24 hours ending 05/19/20 0911    Lab Results: No New Labs Available For Today          Current Facility-Administered Medications:  acetaminophen 325 mg Oral Q6H PRN Greer Beltran MD   acetaminophen 650 mg Oral Q6H PRN Greer Beltran MD   acetaminophen 650 mg Oral Q8H PRN Greer Beltran MD   albuterol 2 puff Inhalation Q4H PRN Greer Beltran MD   aluminum-magnesium hydroxide-simethicone 15 mL Oral Q4H PRN Greer Beltran MD   ammonium lactate 1 application Topical BID PRN Mandi James Hakan Mcneal MD   ARIPiprazole 2 mg Oral Daily Sarah Hanna MD   benzonatate 100 mg Oral TID PRN Sarah Hanna MD   benztropine 1 mg Intramuscular Q8H PRN Sarah Hanna MD   carbamide peroxide 5 drop Left Ear BID PRN Sarah Hanna MD   cloZAPine 25 mg Oral BID Sarah Hanna MD   cloZAPine 50 mg Oral BID Sarah Hanna MD   docusate sodium 100 mg Oral BID PRN Sarah Hanna MD   EPINEPHrine PF 0 15 mg Intramuscular Once PRN Sarah Hanna MD   fluticasone-vilanterol 1 puff Inhalation Daily Sarah Hanna MD   ketotifen 1 drop Right Eye BID PRN Sarah Hanna MD   levothyroxine 125 mcg Oral Early Morning Sarah Hanna MD   magnesium hydroxide 30 mL Oral Daily PRN Sarah Hanna MD   montelukast 10 mg Oral HS Sarah Hanna MD   OLANZapine 5 mg Intramuscular Q8H PRN Sarah Hanna MD   OLANZapine 5 mg Oral Q8H PRN Sarah Hanna MD   ondansetron 4 mg Oral Q6H PRN Sarah Hanna MD   pantoprazole 40 mg Oral Early Morning Sarah Hanna MD   polyethylene glycol 17 g Oral Daily PRN Sarah Hanna MD   polyvinyl alcohol 1 drop Both Eyes Q3H PRN Sarah Hanna MD   sertraline 175 mg Oral Daily Sarah Hanna MD   sucralfate 1,000 mg Oral BID Sarah Hanna MD   theophylline 200 mg Oral Daily Sarah Hanna MD   tiotropium 18 mcg Inhalation Daily Sarah Hanna MD   traZODone 25 mg Oral HS PRN Sarah Hanna MD       Counseling / Coordination of Care: Total floor / unit time spent today 15 minutes  Greater than 50% of total time was spent with the patient and / or family counseling and / or somewhat receptive to supportive listening and teaching positive coping skills to deal with symptom mangement  Patient's Rights, confidentiality and exceptions to confidentiality, use of automated medical record, Jeb Patrick staff access to medical record, and consent to treatment reviewed

## 2020-05-19 NOTE — PROGRESS NOTES
[facility] did not attend any evening programming offered  She did do her Incentive Spirometry, achieving consistent volumes of 1250ml  She did come up for HS medicine (except the Singulair), had an HS snack  Was chatty w/male peer @ her table during snack  Wearing now her QHS humidified nasal O2 @ 1L for bed

## 2020-05-19 NOTE — PROGRESS NOTES
Sleeping at this time, oxygen in place  No s/s of distress   Safe precautions in place and monitoring continues

## 2020-05-19 NOTE — PROGRESS NOTES
05/19/20 0913   Team Meeting   Meeting Type Daily Rounds   Team Members Present   Team Members Present Physician;Nurse;; Other (Discipline and Name)   Physician Team Member Dr Cesario Patterson Team Member Jenniffer Bustamante RN   Care Management Team Member Ebony Cope   Other (Discipline and Name) Samson Staples LCSW     No groups, somatic, presents with excuses

## 2020-05-20 NOTE — PLAN OF CARE
Problem: Alteration in Thoughts and Perception  Goal: Verbalize thoughts and feelings  Description  Interventions:  - Promote a nonjudgmental and trusting relationship with the patient through active listening and therapeutic communication  - Assess patient's level of functioning, behavior and potential for risk  - Engage patient in 1 on 1 interactions for a minimum of 15 minutes each session  - Encourage patient to express fears, feelings, frustrations, and discuss symptoms    - [place] patient to reality, help patient recognize reality-based thinking   - Administer medications as ordered and assess for potential side effects  - Provide the patient education related to the signs and symptoms of the illness and desired effects of prescribed medications  Outcome: Progressing     Problem: Ineffective Coping  Goal: Patient/Family verbalizes awareness of resources  Outcome: Progressing  Goal: Understands least restrictive measures  Description  Interventions:  - Utilize least restrictive behavior  Outcome: Progressing     Problem: Risk for Self Injury/Neglect  Goal: Treatment Goal: Remain safe during length of stay, learn and adopt new coping skills, and be free of self-injurious ideation, impulses and acts at the time of discharge  Outcome: Progressing  Goal: Verbalize thoughts and feelings  Description  Interventions:  - Assess and re-assess patient's lethality and potential for self-injury  - Engage patient in 1:1 interactions, daily, for a minimum of 15 minutes  - Encourage patient to express feelings, fears, frustrations, hopes  - Establish rapport/trust with patient   Outcome: Progressing  Goal: Refrain from harming self  Description  Interventions:  - Monitor patient closely, per order  - Develop a trusting relationship  - Supervise medication ingestion, monitor effects and side effects   Outcome: Progressing     Problem: Depression  Goal: Verbalize thoughts and feelings  Description  Interventions:  - Assess and re-assess patient's level of risk   - Engage patient in 1:1 interactions, daily, for a minimum of 15 minutes   - Encourage patient to express feelings, fears, frustrations, hopes   Outcome: Progressing     Problem: Anxiety  Goal: Anxiety is at manageable level  Description  Interventions:  - Assess and monitor patient's anxiety level  - Monitor for signs and symptoms of anxiety both physical and emotional (heart palpitations, chest pain, shortness of breath, headaches, nausea, feeling jumpy, restlessness, irritable, apprehensive)  - Collaborate with interdisciplinary team and initiate plan and interventions as ordered    - [place] patient to unit/surroundings  - Explain treatment plan  - Encourage participation in care  - Encourage verbalization of concerns/fears  - Identify coping mechanisms  - Assist in developing anxiety-reducing skills  - Administer/offer alternative therapies  - Limit or eliminate stimulants  Outcome: Progressing     Problem: Alteration in Orientation  Goal: Interact with staff daily  Description  Interventions:  - Assess and re-assess patient's level of orientation  - Engage patient in 1 on 1 interactions, daily, for a minimum of 15 minutes   - Establish rapport/trust with patient   Outcome: Progressing     Problem: PAIN - ADULT  Goal: Verbalizes/displays adequate comfort level or baseline comfort level  Description  Interventions:  - Encourage patient to monitor pain and request assistance  - Assess pain using appropriate pain scale  - Administer analgesics based on type and severity of pain and evaluate response  - Implement non-pharmacological measures as appropriate and evaluate response  - Consider cultural and social influences on pain and pain management  - Notify physician/advanced practitioner if interventions unsuccessful or patient reports new pain  Outcome: Progressing     Problem: SAFETY ADULT  Goal: Patient will remain free of falls  Description  INTERVENTIONS:  - Assess patient frequently for physical needs  -  Identify cognitive and physical deficits and behaviors that affect risk of falls  -  [place] fall precautions as indicated by assessment   - Educate patient/family on patient safety including physical limitations  - Instruct patient to call for assistance with activity based on assessment  - Modify environment to reduce risk of injury  - Consider OT/PT consult to assist with strengthening/mobility  Outcome: Progressing     Problem: Alteration in Thoughts and Perception  Goal: Treatment Goal: Gain control of psychotic behaviors/thinking, reduce/eliminate presenting symptoms and demonstrate improved reality functioning upon discharge  Outcome: Not Progressing  Goal: Attend and participate in unit activities, including therapeutic, recreational, and educational groups  Description  Interventions:  - Provide therapeutic and educational activities daily, encourage attendance and participation, and document same in the medical record     CERTIFIED PEER SPECIALIST INTERVENTIONS:    Complete peer assessment with patient to assess their needs and identify their goals to complete while in the recovery program as well as once discharged into the community  Patient will complete WRAP Plan, Crisis Plan and 5 Life Domains  Patient will attend 50% of groups offered on the unit  Patient will complete a goal card weekly      Outcome: Not Progressing  Goal: Recognize dysfunctional thoughts, communicate reality-based thoughts at the time of discharge  Description  Interventions:  - Provide medication and psycho-education to assist patient in compliance and developing insight into his/her illness   Outcome: Not Progressing  Goal: Complete daily ADLs, including personal hygiene independently, as able  Description  Interventions:  - Observe, teach, and assist patient with ADLS  - Monitor and promote a balance of rest/activity, with adequate nutrition and elimination   Outcome: Not Progressing     Problem: Ineffective Coping  Goal: Participates in unit activities  Description  Interventions:  - Provide therapeutic environment   - Provide required programming   - Redirect inappropriate behaviors   Outcome: Not Progressing     Problem: Risk for Self Injury/Neglect  Goal: Attend and participate in unit activities, including therapeutic, recreational, and educational groups  Description  Interventions:  - Provide therapeutic and educational activities daily, encourage attendance and participation, and document same in the medical record  - Obtain collateral information, encourage visitation and family involvement in care   Outcome: Not Progressing  Goal: Recognize maladaptive responses and adopt new coping mechanisms  Outcome: Not Progressing  Goal: Complete daily ADLs, including personal hygiene independently, as able  Description  Interventions:  - Observe, teach, and assist patient with ADLS  - Monitor and promote a balance of rest/activity, with adequate nutrition and elimination  Outcome: Not Progressing     Problem: Depression  Goal: Treatment Goal: Demonstrate behavioral control of depressive symptoms, verbalize feelings of improved mood/affect, and adopt new coping skills prior to discharge  Outcome: Not Progressing  Goal: Refrain from isolation  Description  Interventions:  - Develop a trusting relationship   - Encourage socialization   Outcome: Not Progressing  Goal: Refrain from self-neglect  Outcome: Not Progressing     Problem: Nutrition/Hydration-ADULT  Goal: Nutrient/Hydration intake appropriate for improving, restoring or maintaining nutritional needs  Description  Monitor and assess patient's nutrition/hydration status for malnutrition  Collaborate with interdisciplinary team and initiate plan and interventions as ordered  Monitor patient's weight and dietary intake as ordered or per policy  Utilize nutrition screening tool and intervene as necessary   Determine patient's food preferences and provide high-protein, high-caloric foods as appropriate  INTERVENTIONS:  - Monitor oral intake, urinary output, labs, and treatment plans  - Assess nutrition and hydration status and recommend course of action  - Evaluate amount of meals eaten  - Assist patient with eating if necessary   - Allow adequate time for meals  - Recommend/ encourage appropriate diets, oral nutritional supplements, and vitamin/mineral supplements  - Order, calculate, and assess calorie counts as needed  - Recommend, monitor, and adjust tube feedings and TPN/PPN based on assessed needs  - Assess need for intravenous fluids  - Provide specific nutrition/hydration education as appropriate  - Include patient/family/caregiver in decisions related to nutrition  Outcome: Not Progressing    Mostly isolative to her bed in her room, out for meds and meals, did not attend groups  Ate 25% of both meals  Continues to refuse scheduled Abilify  Took other scheduled meds  No other behaviors or issues noted  Continue to monitor

## 2020-05-20 NOTE — PROGRESS NOTES
05/20/20 0900   Team Meeting   Meeting Type Daily Rounds   Team Members Present   Team Members Present Physician;Nurse; Other (Discipline and Name)   Physician Team Member Dr Aaron Curtis Team Member Radha Nance RN   Other (Discipline and Name) Emerson Simon LCSW     No shower since 5/5  No groups all day  Ate 75/0/10  Refusing Abilify  Due for CBC 5/22

## 2020-05-20 NOTE — PROGRESS NOTES
05/20/20 1100   Activity/Group Checklist   Group Other (Comment)  ([facility] [employer])   Attendance Did not attend     Patient prompted to attend group, but declined  Patient in bed when prompted

## 2020-05-20 NOTE — PROGRESS NOTES
Maggie maintained on ongoing fall and SAFE precaution  Freeda Pacer in bed with eyes closed, breath even and unlabored   On O2 with humidifier @1L/m via nasal cannula  Continues rounding implemented   No somatic complaint overnight  No PRN needed for sleep aid   No indication of pain or discomfort   Will continue to monitor

## 2020-05-20 NOTE — PROGRESS NOTES
05/19/20 1100   Activity/Group Checklist   Group Other (Comment)  ([employer]   Attendance Did not attend     Patient was encouraged to attend group, but declined  Patient was in bed when prompted

## 2020-05-20 NOTE — PROGRESS NOTES
Psychiatry Progress Note [address] 60 y o  female MRN: [id number]  Unit/Bed#: [facility] 111-01 Encounter: [id number]  Code Status: Level 1 - Full Code    PCP: Kera Johnson PA-C    Date of Admission:  7/23/2019 6330   Date of Service:  05/20/20  Patient Active Problem List   Diagnosis    COPD with asthma ([facility] )    Tobacco use disorder, continuous    Compression fracture of L4 lumbar vertebra    Ventral hernia    Acute on chronic respiratory failure with hypoxia ([facility] )    [facility], [facility]    Acquired hypothyroidism    Gastroesophageal reflux disease without esophagitis    Abnormal CT of the chest    Excessive cerumen in left ear canal    Lipoma of right upper extremity    Noncompliant with deep vein thrombosis (DVT) prophylaxis    At risk for aspiration    Ear ache    Tachycardia    Chest pain    Low HDL (under 40)     Diagnosis schizoaffective bipolar  Assessment    Overall Status:  Again refusing showers for 2 weeks, with psychosomatic symptoms in multiple excuses not to take showers and still afraid that she might choke and not eating meals all the time   Certification Statement to demonstrate a period of stability by attending to ADLs and becoming less psychosomatic in attending more groups Acceptance by patient:  Accepting  Naa Yo in recovery:  Living at another personal care home   Understanding of medications: Yes    Involved in reintegration process: On hold due to [address] in relationship with psychiatrist:  Trusting sometimes    Medication changes   None today  Non-pharmacological treatments   Waiting for your culture and sensitivity report   Continue with individual, group, milieu and occupational therapy using recovery principles and psycho-education about accepting illness and the need for treatment     Encourage to take showers twice a week and cooperate with treatment and adl skills as per her behav  Plan   Therapeutic passes on hold due to covid 19 lock down   Prepare for the [facility] and encouraged to accept  rather than refuse it   Reminded to attend at least 25% of groups on a daily basis including [facility]    Safety   Safety and communication plan established to target dynamic risk factors discussed above  Discharge Plan   · back to a [facility] at [address]   Patient still has not taken a shower in the last 2 weeks since 05/05/2020 and has been coming up with multiple excuses of not taking a shower for so long and is also refusing to take the Abilify that was offered  She continues to believe she is going to die from choking so she refuses to eat all the time and believes that staff is tampering with her medications so she examines them and continues to accuse some of the staff of tampering with her oxygen concentrator and spirometer and believes that the Abilify is going to kill her  She is not amenable to verbal support or reassurance and remains poorly groomed disheveled unkept suspicious paranoid staying on bed not attending groups and being defiant and passive-aggressive  She is not complaining about hearing voices lately  Sleep:   Fair  Appetite:   Fair but she picks and chooses her food and now asking to see a dietitian  Compliance with medications:  Good  Side effects:   none but claims to feel tired sometimes  Review of systems:  Continues to have psychosomatic symptoms and with increased frequency of urination    Mental Status Exam  Appearance:    Found laying in bed but did get up when approached complaining of feeling tired and having no one to help her with a shower, poorly groomed Looks older than her stated age,and not very well kept, with uncombed hairt but  wearing clean clothing   Anxious preoccupied  Has good eye contact     Suspicious in her interaction  Behavior:    Superficially friendly pleasant but anxious suspicious and preoccupied  Speech:    tangential at times with tendency to become stilted   Mood:     anxious irritated times,    Affect:    increased in intensity range mood congruent redirectable  Thought Process:   Circumstantial with tendency to have stilted speech   Thought Content:   Again paranoid about motives of staff switching her medications or tampering with her inhalant or her oxygen concentrator off and on  She also has to examine her pills literally before taking them  No preoccupation with violence or suicide  No current suicidal homicidal thoughts intent or plans reported  No phobias but has obsessions and compulsions about examining her pills before she takes them  Karrie Nissen Psychosomatic about dying by choking from food and from heart attack or from shortness of breath and now from catching Covid-19 which are all ongoing beliefs that she has  Tells me that she will try to take a shower attend  Perceptual Disturbances:  Claims to hear voices calling her name hand hearing conversations when no one is around Risk Potential:   Ability to care for herself and refusal to take showers  Sensorium:   fully oriented to place, person, time, date, day, month, year and to situation  Cognition:    Grossly intact, aware of current events like the corona virus situation, recent and remote memory intact     No language deficit  Consciousness:   Alert and awake  Attention and concentration:  fair  Intellect:    average  Insight:    limited  Judgment:    fair  Motor Activity:  No abnormal involuntary movement noted today    Vitals  Temp:  [97 2 °F (36 2 °C)-97 8 °F (36 6 °C)] 97 2 °F (36 2 °C)  HR:  [76-85] 76  Resp:  [16-18] 16  BP: ()/(68-73) 97/68  SpO2:  [93 %] 93 %  No intake or output data in the 24 hours ending 05/20/20 0732    Lab Results: No New Labs Available For Today          Current Facility-Administered Medications:  acetaminophen 325 mg Oral Q6H PRN Deep Durand MD   acetaminophen 650 mg Oral Q6H PRN Deep Durand MD   acetaminophen 650 mg Oral Q8H PRN Jeet Levine MD   albuterol 2 puff Inhalation Q4H PRN Jeet Levine MD   aluminum-magnesium hydroxide-simethicone 15 mL Oral Q4H PRN Jeet Levine MD   ammonium lactate 1 application Topical BID PRN Jeet Levine MD   ARIPiprazole 2 mg Oral Daily Jeet Levine MD   benzonatate 100 mg Oral TID PRN Jeet Levine MD   benztropine 1 mg Intramuscular Q8H PRN Jeet Levine MD   carbamide peroxide 5 drop Left Ear BID PRN Jeet Levine MD   cloZAPine 25 mg Oral BID Jeet Levine MD   cloZAPine 50 mg Oral BID Jeet Levine MD   docusate sodium 100 mg Oral BID PRN Jeet Levine MD   EPINEPHrine PF 0 15 mg Intramuscular Once PRN Jeet Levine MD   fluticasone-vilanterol 1 puff Inhalation Daily Jeet Levine MD   ketotifen 1 drop Right Eye BID PRN Jeet Levine MD   levothyroxine 125 mcg Oral Early Morning Jeet Levine MD   magnesium hydroxide 30 mL Oral Daily PRN Jeet Levine MD   montelukast 10 mg Oral HS Jeet Levine MD   OLANZapine 5 mg Intramuscular Q8H PRN Jeet Levine MD   OLANZapine 5 mg Oral Q8H PRN Jeet Levine MD   ondansetron 4 mg Oral Q6H PRN Jeet Levine MD   pantoprazole 40 mg Oral Early Morning Jeet Levine MD   polyethylene glycol 17 g Oral Daily PRN Jeet Levine MD   polyvinyl alcohol 1 drop Both Eyes Q3H PRN Jeet Levine MD   sertraline 175 mg Oral Daily Jeet Levine MD   sucralfate 1,000 mg Oral BID Jeet Levine MD   theophylline 200 mg Oral Daily Jeet Levine MD   tiotropium 18 mcg Inhalation Daily Jeet Levine MD   traZODone 25 mg Oral HS PRN Jeet Levine MD       Counseling / Coordination of Care: Total floor / unit time spent today 15 minutes  Greater than 50% of total time was spent with the patient and / or family counseling and / or somewhat receptive to supportive listening and teaching positive coping skills to deal with symptom mangement       Patient's Rights, confidentiality and exceptions to confidentiality, use of automated medical record, [address] staff access to medical record, and consent to treatment reviewed

## 2020-05-20 NOTE — PLAN OF CARE
Problem: Alteration in Thoughts and Perception  Goal: Verbalize thoughts and feelings  Description  Interventions:  - Promote a nonjudgmental and trusting relationship with the patient through active listening and therapeutic communication  - Assess patient's level of functioning, behavior and potential for risk  - Engage patient in 1 on 1 interactions for a minimum of 15 minutes each session  - Encourage patient to express fears, feelings, frustrations, and discuss symptoms    - [place] patient to reality, help patient recognize reality-based thinking   - Administer medications as ordered and assess for potential side effects  - Provide the patient education related to the signs and symptoms of the illness and desired effects of prescribed medications  Outcome: Progressing  Goal: Agree to be compliant with medication regime, as prescribed and report medication side effects  Description  Interventions:  - Offer appropriate PRN medication and supervise ingestion; conduct aims, as needed   Outcome: Progressing     Problem: Alteration in Thoughts and Perception  Goal: Complete daily ADLs, including personal hygiene independently, as able  Description  Interventions:  - Observe, teach, and assist patient with ADLS  - Monitor and promote a balance of rest/activity, with adequate nutrition and elimination   Outcome: Not Progressing     Problem: Depression  Goal: Refrain from isolation  Description  Interventions:  - Develop a trusting relationship   - Encourage socialization   Outcome: Not Progressing     Problem: Nutrition/Hydration-ADULT  Goal: Nutrient/Hydration intake appropriate for improving, restoring or maintaining nutritional needs  Description  Monitor and assess patient's nutrition/hydration status for malnutrition  Collaborate with interdisciplinary team and initiate plan and interventions as ordered  Monitor patient's weight and dietary intake as ordered or per policy   Utilize nutrition screening tool and intervene as necessary  Determine patient's food preferences and provide high-protein, high-caloric foods as appropriate  INTERVENTIONS:  - Monitor oral intake, urinary output, labs, and treatment plans  - Assess nutrition and hydration status and recommend course of action  - Evaluate amount of meals eaten  - Assist patient with eating if necessary   - Allow adequate time for meals  - Recommend/ encourage appropriate diets, oral nutritional supplements, and vitamin/mineral supplements  - Order, calculate, and assess calorie counts as needed  - Recommend, monitor, and adjust tube feedings and TPN/PPN based on assessed needs  - Assess need for intravenous fluids  - Provide specific nutrition/hydration education as appropriate  - Include patient/family/caregiver in decisions related to nutrition  Outcome: Not Progressing     [address] is isolative to room & bed where sleeps much of the time  Still is not addressing hygiene; last shower 15days ago, hair matting, disheveled  Is preoccupied w/an interaction she had today w/a female PA-C or doctor that accompanied Dr David Verma, then came back alone to see her & she believes was from [address] [place] [address]; says was told does not need O2 or inhalers  "That's not true; I've needed them for years " Was reassured that no orders were altered  Did come out for supper medicine  Ate 50% (1/2 tuna salad) for meal plus an Ensure

## 2020-05-20 NOTE — PROGRESS NOTES
05/19/20 1430   Activity/Group Checklist   Group Other (Comment)  (Recovery Workshop)   Attendance Did not attend     Patient was encouraged to attend group, but declined  Patient was in bed when prompted

## 2020-05-21 NOTE — PROGRESS NOTES
[address] Maggie did not attend PM Group  She did do her Incentive Spirometry achieving mostly 1250ml volumes  Came out for HS snack & HS medicine (except the Singulair)  Wearing now her QHS humidified nasal O2 @ 1L for bed

## 2020-05-21 NOTE — PLAN OF CARE
Problem: Alteration in Thoughts and Perception  Goal: Verbalize thoughts and feelings  Description  Interventions:  - Promote a nonjudgmental and trusting relationship with the patient through active listening and therapeutic communication  - Assess patient's level of functioning, behavior and potential for risk  - Engage patient in 1 on 1 interactions for a minimum of 15 minutes each session  - Encourage patient to express fears, feelings, frustrations, and discuss symptoms    - Delight patient to reality, help patient recognize reality-based thinking   - Administer medications as ordered and assess for potential side effects  - Provide the patient education related to the signs and symptoms of the illness and desired effects of prescribed medications  Outcome: Progressing     Problem: Ineffective Coping  Goal: Patient/Family verbalizes awareness of resources  Outcome: Progressing  Goal: Understands least restrictive measures  Description  Interventions:  - Utilize least restrictive behavior  Outcome: Progressing     Problem: Risk for Self Injury/Neglect  Goal: Treatment Goal: Remain safe during length of stay, learn and adopt new coping skills, and be free of self-injurious ideation, impulses and acts at the time of discharge  Outcome: Progressing  Goal: Verbalize thoughts and feelings  Description  Interventions:  - Assess and re-assess patient's lethality and potential for self-injury  - Engage patient in 1:1 interactions, daily, for a minimum of 15 minutes  - Encourage patient to express feelings, fears, frustrations, hopes  - Establish rapport/trust with patient   Outcome: Progressing  Goal: Refrain from harming self  Description  Interventions:  - Monitor patient closely, per order  - Develop a trusting relationship  - Supervise medication ingestion, monitor effects and side effects   Outcome: Progressing     Problem: Depression  Goal: Verbalize thoughts and feelings  Description  Interventions:  - Assess and re-assess patient's level of risk   - Engage patient in 1:1 interactions, daily, for a minimum of 15 minutes   - Encourage patient to express feelings, fears, frustrations, hopes   Outcome: Progressing     Problem: Anxiety  Goal: Anxiety is at manageable level  Description  Interventions:  - Assess and monitor patient's anxiety level  - Monitor for signs and symptoms of anxiety both physical and emotional (heart palpitations, chest pain, shortness of breath, headaches, nausea, feeling jumpy, restlessness, irritable, apprehensive)  - Collaborate with interdisciplinary team and initiate plan and interventions as ordered    - [place] patient to unit/surroundings  - Explain treatment plan  - Encourage participation in care  - Encourage verbalization of concerns/fears  - Identify coping mechanisms  - Assist in developing anxiety-reducing skills  - Administer/offer alternative therapies  - Limit or eliminate stimulants  Outcome: Progressing     Problem: Alteration in Orientation  Goal: Interact with staff daily  Description  Interventions:  - Assess and re-assess patient's level of orientation  - Engage patient in 1 on 1 interactions, daily, for a minimum of 15 minutes   - Establish rapport/trust with patient   Outcome: Progressing  Goal: Cooperate with recommended testing/procedures  Description  Interventions:  - Determine need for ancillary testing  - Observe for mental status changes  - Implement falls/precaution protocol   Outcome: Progressing     Problem: PAIN - ADULT  Goal: Verbalizes/displays adequate comfort level or baseline comfort level  Description  Interventions:  - Encourage patient to monitor pain and request assistance  - Assess pain using appropriate pain scale  - Administer analgesics based on type and severity of pain and evaluate response  - Implement non-pharmacological measures as appropriate and evaluate response  - Consider cultural and social influences on pain and pain management  - Notify physician/advanced practitioner if interventions unsuccessful or patient reports new pain  Outcome: Progressing     Problem: Alteration in Thoughts and Perception  Goal: Treatment Goal: Gain control of psychotic behaviors/thinking, reduce/eliminate presenting symptoms and demonstrate improved reality functioning upon discharge  Outcome: Not Progressing  Goal: Agree to be compliant with medication regime, as prescribed and report medication side effects  Description  Interventions:  - Offer appropriate PRN medication and supervise ingestion; conduct aims, as needed   Outcome: Not Progressing  Goal: Attend and participate in unit activities, including therapeutic, recreational, and educational groups  Description  Interventions:  - Provide therapeutic and educational activities daily, encourage attendance and participation, and document same in the medical record     CERTIFIED PEER SPECIALIST INTERVENTIONS:    Complete peer assessment with patient to assess their needs and identify their goals to complete while in the recovery program as well as once discharged into the community  Patient will complete WRAP Plan, Crisis Plan and [employer]  Patient will attend 50% of groups offered on the unit  Patient will complete a goal card weekly      Outcome: Not Progressing  Goal: Recognize dysfunctional thoughts, communicate reality-based thoughts at the time of discharge  Description  Interventions:  - Provide medication and psycho-education to assist patient in compliance and developing insight into his/her illness   Outcome: Not Progressing  Goal: Complete daily ADLs, including personal hygiene independently, as able  Description  Interventions:  - Observe, teach, and assist patient with ADLS  - Monitor and promote a balance of rest/activity, with adequate nutrition and elimination   Outcome: Not Progressing     Problem: Ineffective Coping  Goal: Participates in unit activities  Description  Interventions:  - Provide therapeutic environment   - Provide required programming   - Redirect inappropriate behaviors   Outcome: Not Progressing     Problem: Risk for Self Injury/Neglect  Goal: Attend and participate in unit activities, including therapeutic, recreational, and educational groups  Description  Interventions:  - Provide therapeutic and educational activities daily, encourage attendance and participation, and document same in the medical record  - Obtain collateral information, encourage visitation and family involvement in care   Outcome: Not Progressing  Goal: Recognize maladaptive responses and adopt new coping mechanisms  Outcome: Not Progressing  Goal: Complete daily ADLs, including personal hygiene independently, as able  Description  Interventions:  - Observe, teach, and assist patient with ADLS  - Monitor and promote a balance of rest/activity, with adequate nutrition and elimination  Outcome: Not Progressing     Problem: Depression  Goal: Treatment Goal: Demonstrate behavioral control of depressive symptoms, verbalize feelings of improved mood/affect, and adopt new coping skills prior to discharge  Outcome: Not Progressing  Goal: Refrain from isolation  Description  Interventions:  - Develop a trusting relationship   - Encourage socialization   Outcome: Not Progressing  Goal: Refrain from self-neglect  Outcome: Not Progressing     Problem: Nutrition/Hydration-ADULT  Goal: Nutrient/Hydration intake appropriate for improving, restoring or maintaining nutritional needs  Description  Monitor and assess patient's nutrition/hydration status for malnutrition  Collaborate with interdisciplinary team and initiate plan and interventions as ordered  Monitor patient's weight and dietary intake as ordered or per policy  Utilize nutrition screening tool and intervene as necessary  Determine patient's food preferences and provide high-protein, high-caloric foods as appropriate       INTERVENTIONS:  - Monitor oral intake, urinary output, labs, and treatment plans  - Assess nutrition and hydration status and recommend course of action  - Evaluate amount of meals eaten  - Assist patient with eating if necessary   - Allow adequate time for meals  - Recommend/ encourage appropriate diets, oral nutritional supplements, and vitamin/mineral supplements  - Order, calculate, and assess calorie counts as needed  - Recommend, monitor, and adjust tube feedings and TPN/PPN based on assessed needs  - Assess need for intravenous fluids  - Provide specific nutrition/hydration education as appropriate  - Include patient/family/caregiver in decisions related to nutrition  Outcome: Not Progressing   Mostly isolative to her bed in her room, out for meds and meals, did not attend groups  Ate 50% of breakfast and 0% of lunch  Continues to refuse scheduled Abilify but took other scheduled meds without issue  Compliant with allowing staff to obtain an EKG reading but worrisome after, questioning why she needs one, what the results were, and if something is wrong with her  Educated and reassured the patient and afterward she expressed relief  No other behaviors or issues noted  Continue to monitor

## 2020-05-21 NOTE — PROGRESS NOTES
05/21/20 1323   Team Meeting   Meeting Type Daily Rounds   Team Members Present   Team Members Present Physician;;Nurse;   Physician Team Member Dr Ruddy Reyna  , RN   Care Management Team Member ASHLYN Hamilton   Social Work Team Member Cesar Bhatt LCSW     No shower, no motivation   Somatic with excuses

## 2020-05-21 NOTE — PROGRESS NOTES
Psychiatry Progress Note [address] 60 y o  female MRN: [id number]  Unit/Bed#: [facility]ARCIO [facility] 111-01 Encounter: [id number]  Code Status: Level 1 - Full Code    PCP: Candance Gamble, PA-C    Date of Admission:  7/23/2019 1730   Date of Service:  05/21/20  Patient Active Problem List   Diagnosis    COPD with asthma ([facility] )    Tobacco use disorder, continuous    Compression fracture of L4 lumbar vertebra    Ventral hernia    Acute on chronic respiratory failure with hypoxia ([facility] )    Schizoaffective disorder, bipolar type ([facility] )    Acquired hypothyroidism    Gastroesophageal reflux disease without esophagitis    Abnormal CT of the chest    Excessive cerumen in left ear canal    Lipoma of right upper extremity    Noncompliant with deep vein thrombosis (DVT) prophylaxis    At risk for aspiration    Ear ache    Tachycardia    Chest pain    Low HDL (under 40)     Diagnosis schizoaffective bipolar  Assessment    Overall Status:  Refusing showers for 2 weeks still and staying on bed not attending groups appearing so as suspicious psychosomatic anxious with poor grooming   Certification Statement to demonstrate a period of stability by attending to ADLs and becoming less psychosomatic in attending more groups Acceptance by patient:  Accepting  Letty Henry in recovery:  Living at another personal care home   Understanding of medications:  Patient is aware about risks side effects benefits and precautions of medications   Involved in reintegration process:   On hold due to [address] in relationship with psychiatrist:  Trusting sometimes    Medication changes   Increase Zoloft as 200 mg a day for maximum benefit  Non-pharmacological treatments   Waiting for your culture and sensitivity report   Continue with individual, group, milieu and occupational therapy using recovery principles and psycho-education about accepting illness and the need for treatment   Encourage to take showers twice a week and cooperate with treatment and adl skills as per her behav  Plan   Therapeutic passes on hold due to covid 19 lock down   [facility]are for the [facility] and encouraged to accept  rather than refuse it   Reminded to attend at least 25% of groups on a daily basis including IMR   Encouraged to start taking the Abilify and the increased Zoloft    Safety   Safety and communication plan established to target dynamic risk factors discussed above  Discharge Plan   · back to a [facility] at [place]    Interval Progress   Patient is still refusing to take the Abilify and has not taken a shower in over 2 weeks and is being passive-aggressive claiming she will take a shower and then refuses to take 1  She comes up with multiple excuses but not taking showers and for not attending groups as she continues to believe she is dying from some terrible disease is  She is not able to be redirected  She continues to believe they are tampering with her medications so she examines them personally before taking them by mouth and still accuses staff of tampering with her oxygen concentrator in and spirometer as usual   She remains suspicious paranoid but does not report any voices lately  She was told that we need to increase the Zoloft to maximum dose of 200 mg to see if that might make any change in her depression and anxiety  Sleep:   Fair  Appetite:   Fair but she picks and chooses her food  Compliance with medications:  Good  Side effects:   none but claims to feel tired sometimes  Review of systems:  Continues to have psychosomatic symptoms     Mental Status Exam  Appearance:    Laying in bed but did get up when approached complaining of feeling tired and having no one to help her with a shower, poorly groomed Looks older than her stated age,and not very well kept, with uncombed hairt but  wearing clean clothing   Anxious preoccupied  Has good eye contact     Suspicious in her interaction  Disheveled  Behavior:    Superficially friendly pleasant but anxious suspicious and preoccupied  Speech:    tangential at times with tendency to become stilted   Mood:     anxious irritated times,    Affect:    increased in intensity range mood congruent redirectable  Thought Process:   Circumstantial with tendency to have stilted speech   Thought Content:   Paranoid about motives of staff switching her medications or tampering with her inhalant or her oxygen concentrator off and on  She also has to examine her pills literally before taking them  No preoccupation with violence or suicide  No current suicidal homicidal thoughts intent or plans reported  No phobias but has obsessions and compulsions about examining her pills before she takes them  Nabeel Stuart Psychosomatic about dying by choking from food and from heart attack or from shortness of breath and now from catching Covid-19 which are all ongoing beliefs that she has  Tells me that she will try to take a shower attend  Perceptual Disturbances:  Claims to hear voices calling her name hand hearing conversations when no one is around Risk Potential:   Ability to care for herself and refusal to take showers  Sensorium:   fully oriented to place, person, time, date, day, month, year and to situation  Cognition:    Grossly intact, aware of current events like the corona virus situation, recent and remote memory intact     No language deficit  Consciousness:   Alert and awake  Attention and concentration:  fair  Intellect:    average  Insight:    limited  Judgment:    fair  Motor Activity:  No abnormal involuntary movement noted today    Vitals  Temp:  [97 4 °F (36 3 °C)-97 5 °F (36 4 °C)] 97 5 °F (36 4 °C)  HR:  [78-93] 78  Resp:  [16-18] 18  BP: ()/(54-60) 110/54  SpO2:  [92 %] 92 %  No intake or output data in the 24 hours ending 05/21/20 0830    Lab Results: No New Labs Available For Today          Current Facility-Administered Medications:  acetaminophen 325 mg Oral Q6H PRN Jaz Beasley MD   acetaminophen 650 mg Oral Q6H PRN Jaz Beasley MD   acetaminophen 650 mg Oral Q8H PRN Jaz Beasley MD   albuterol 2 puff Inhalation Q4H PRN Jaz Beasley MD   aluminum-magnesium hydroxide-simethicone 15 mL Oral Q4H PRN Jaz Beasley MD   ammonium lactate 1 application Topical BID PRN Jaz Beasley MD   ARIPiprazole 2 mg Oral Daily Jaz Beasley MD   benzonatate 100 mg Oral TID PRN Jaz Beasley MD   benztropine 1 mg Intramuscular Q8H PRN Jaz Beasley MD   carbamide peroxide 5 drop Left Ear BID PRN Jaz Beasley MD   cloZAPine 25 mg Oral BID Jaz Beasley MD   cloZAPine 50 mg Oral BID Jaz Beasley MD   docusate sodium 100 mg Oral BID PRN Jaz Beasley MD   EPINEPHrine PF 0 15 mg Intramuscular Once PRN Jaz Beasley MD   fluticasone-vilanterol 1 puff Inhalation Daily Jaz Beasley MD   ketotifen 1 drop Right Eye BID PRN Jaz Beasley MD   levothyroxine 125 mcg Oral Early Morning Jaz Beasley MD   magnesium hydroxide 30 mL Oral Daily PRN Jaz Beasley MD   montelukast 10 mg Oral HS Jaz Beasley MD   OLANZapine 5 mg Intramuscular Q8H PRN Jaz Beasley MD   OLANZapine 5 mg Oral Q8H PRN Jaz Beasley MD   ondansetron 4 mg Oral Q6H PRN Jaz Beasley MD   pantoprazole 40 mg Oral Early Morning Jaz Beasley MD   polyethylene glycol 17 g Oral Daily PRN Jaz Beasley MD   polyvinyl alcohol 1 drop Both Eyes Q3H PRN MD Teresa Blair ON 5/22/2020] sertraline 200 mg Oral Daily Jaz Beasley MD   sucralfate 1,000 mg Oral BID Jaz Beasley MD   theophylline 200 mg Oral Daily Jaz Beasley MD   tiotropium 18 mcg Inhalation Daily Jaz Beasley MD   traZODone 25 mg Oral HS PRN Jaz Beasley MD       Counseling / Coordination of Care: Total floor / unit time spent today 15 minutes  Greater than 50% of total time was spent with the patient and / or family counseling and / or somewhat receptive to supportive listening and teaching positive coping skills to deal with symptom mangement  Patient's Rights, confidentiality and exceptions to confidentiality, use of automated medical record, [address] staff access to medical record, and consent to treatment reviewed

## 2020-05-21 NOTE — PROGRESS NOTES
05/21/20 1100   Activity/Group Checklist   Group Other (Comment)  ([facility] - Making History)   Attendance Did not attend     Patient was personally prompted to attend group today, but declined  Previously, Osmin Colon had a discussion with patient about some of the dangers of not taking a shower for 16 days  Patient "promised" she would shower this evening  Therapist expressed concern for patient's health and well-being if she does not bathe soon  Likewise, patient was reminded that showering is especially important at this point in time because of COVID-19  Patient declined group, laying in bed when prompted

## 2020-05-21 NOTE — PROGRESS NOTES
Maggie maintained on ongoing fall and SAFE precaution  Paralee Jennifer in bed with eyes closed, breath even and unlabored   On O2 with humidifier @1L/m via nasal cannula  Continues rounding implemented   No somatic complaint overnight  No PRN needed for sleep aid   No indication of pain or discomfort   Will continue to monitor

## 2020-05-22 NOTE — PROGRESS NOTES
Psychiatry Progress Note [address] 60 y o  female MRN: [id number]  Unit/Bed#: [facility]ARCIO ADAIR [facility] 111-01 Encounter: [id number]  Code Status: Level 1 - Full Code    PCP: Millicent Neal PA-C    Date of Admission:  7/23/2019 1730   Date of Service:  05/22/20  Patient Active Problem List   Diagnosis    COPD with asthma ([facility] )    Tobacco use disorder, continuous    Compression fracture of L4 lumbar vertebra    Ventral hernia    Acute on chronic respiratory failure with hypoxia ([facility] )    [facility], [facility]    Acquired hypothyroidism    Gastroesophageal reflux disease without esophagitis    Abnormal CT of the chest    Excessive cerumen in left ear canal    Lipoma of right upper extremity    Noncompliant with deep vein thrombosis (DVT) prophylaxis    At risk for aspiration    Ear ache    Tachycardia    Chest pain    Low HDL (under 40)     Diagnosis schizoaffective bipolar  Assessment    Overall Status:  Still not taking showers for more than 2 weeks being preoccupied psychosomatic and refusing Abilify and not attending groups appearing anxious withdrawn fearful    Certification Statement to demonstrate a period of stability by attending to ADLs and becoming less psychosomatic in attending more groups Acceptance by patient:  Accepting  Rosabel Saint in recovery:  Living at another personal care home   Understanding of medications:  Patient is aware about risks side effects benefits and precautions of medications   Involved in reintegration process:   On hold due to [address] in relationship with psychiatrist:  Trusting sometimes    Medication changes   Increase Zoloft as 200 mg a day for maximum benefit  Non-pharmacological treatments   Waiting for your culture and sensitivity report   Continue with individual, group, milieu and occupational therapy using recovery principles and psycho-education about accepting illness and the need for treatment   Encourage to take showers twice a week and cooperate with treatment and adl skills as per her behav  Plan   Therapeutic passes on hold due to covid 19 lock down   Prepare for the [facility] and encouraged to accept  rather than refuse it   Reminded to attend at least 25% of groups on a daily basis including IMR   Encouraged to start taking the Abilify and the increased Zoloft    Safety   Safety and communication plan established to target dynamic risk factors discussed above  Discharge Plan   · back to a [facility] at [address]   Patient continues to be passive-aggressive not taking showers in more than 2 weeks despite promises she was going to and then comes up with multiple excuses for not taking showers  She is still suspicious preoccupied and believes she is dying from some kind of terrible illness is  For instance she thought something was wrong with her when we took a regular EKG which came back as unremarkable  She continues to believe she is going to die from choking on food or from shortness of breath or from heart problems and needs constant reassurance to the contrary  She appears disheveled poorly kept with uncombed hair preferring to lay back on her bed but does accept meals and most of her medications and is still resistive to taking the Abilify  Sleep:   Fair  Appetite:   Fair but she picks and chooses her food  Compliance with medications:  Good  Side effects:   none but claims to feel tired sometimes  Review of systems:  Continues to have psychosomatic symptoms     Mental Status Exam  Appearance:    Patient found laying in bed as usual but did get up when approached complaining of feeling tired and having no one to help her with a shower, poorly groomed Looks older than her stated age,and not very well kept, with uncombed hairt but  wearing clean clothing   Anxious preoccupied  Has good eye contact   Suspicious in her interaction    Disheveled  Behavior: Superficially friendly pleasant but anxious suspicious and preoccupied  Speech:    tangential at times with tendency to become stilted   Mood:     anxious irritated times,    Affect:    increased in intensity range mood congruent redirectable  Thought Process:   Circumstantial with tendency to have stilted speech   Thought Content:   Still paranoid about motives of staff switching her medications or tampering with her inhalant or her oxygen concentrator off and on  She was even questioning why she was getting an EKG  She also has to examine her pills literally before taking them  No preoccupation with violence or suicide  No current suicidal homicidal thoughts intent or plans reported  No phobias but has obsessions and compulsions about examining her pills before she takes them  Shani Flores Psychosomatic about dying by choking from food and from heart attack or from shortness of breath and now from catching Covid-19 which are all ongoing beliefs that she has  Tells me that she will try to take a shower attend  Perceptual Disturbances:  No longer claims to hear voices   Risk Potential:   Ability to care for herself and refusal to take showers  Sensorium:   fully oriented to place, person, time, date, day, month, year and to situation  Cognition:    Grossly intact, aware of current events like the corona virus situation, recent and remote memory intact     No language deficit  Consciousness:   Alert and awake  Attention and concentration:  fair  Intellect:    average  Insight:    limited  Judgment:    fair  Motor Activity:  No abnormal involuntary movement noted today    Vitals  Temp:  [96 9 °F (36 1 °C)] 96 9 °F (36 1 °C)  HR:  [71] 71  Resp:  [14] 14  BP: (92)/(59) 92/59  SpO2:  [97 %] 97 %  No intake or output data in the 24 hours ending 05/22/20 0819    Lab Results: No New Labs Available For Today          Current Facility-Administered Medications:  acetaminophen 325 mg Oral Q6H PRN Christian Bennett MD   acetaminophen 650 mg Oral Q6H PRN Ralph Seeds, MD   acetaminophen 650 mg Oral Q8H PRN Manuel Seeds, MD   albuterol 2 puff Inhalation Q4H PRN Manuel Seeds, MD   aluminum-magnesium hydroxide-simethicone 15 mL Oral Q4H PRN Ralph Seeds, MD   ammonium lactate 1 application Topical BID PRN Manuel Seeds, MD   ARIPiprazole 2 mg Oral Daily Manuel Seeds, MD   benzonatate 100 mg Oral TID PRN Manuel Seeds, MD   benztropine 1 mg Intramuscular Q8H PRN Ralph Seeds, MD   carbamide peroxide 5 drop Left Ear BID PRN Manuel Seeds, MD   cloZAPine 25 mg Oral BID Manuel Seeds, MD   cloZAPine 50 mg Oral BID Ralph Seeds, MD   docusate sodium 100 mg Oral BID PRN Ralph Seeds, MD   EPINEPHrine PF 0 15 mg Intramuscular Once PRN Ralph Seeds, MD   fluticasone-vilanterol 1 puff Inhalation Daily Manuel Seeds, MD   ketotifen 1 drop Right Eye BID PRN Ralph Seeds, MD   levothyroxine 125 mcg Oral Early Morning Ralph Seeds, MD   magnesium hydroxide 30 mL Oral Daily PRN Manuel Seeds, MD   montelukast 10 mg Oral HS Manuel Seeds, MD   OLANZapine 5 mg Intramuscular Q8H PRN Manuel Seeds, MD   OLANZapine 5 mg Oral Q8H PRN Ralph Seeds, MD   ondansetron 4 mg Oral Q6H PRN Manuel Seeds, MD   pantoprazole 40 mg Oral Early Morning Manuel Seeds, MD   polyethylene glycol 17 g Oral Daily PRN Ralph Seeds, MD   polyvinyl alcohol 1 drop Both Eyes Q3H PRN Ralph Seeds, MD   sertraline 200 mg Oral Daily Manuel Seeds, MD   sucralfate 1,000 mg Oral BID Ralph Seeds, MD   theophylline 200 mg Oral Daily Ralph Seeds, MD   tiotropium 18 mcg Inhalation Daily Manuel Seeds, MD   traZODone 25 mg Oral HS PRN Manuel Seeds, MD   tuberculin 5 Units Intradermal Once Manuel Copeland, MD       Counseling / Coordination of Care: Total floor / unit time spent today 15 minutes  Greater than 50% of total time was spent with the patient and / or family counseling and / or somewhat receptive to supportive listening and teaching positive coping skills to deal with symptom mangement       Patient's Rights, confidentiality and exceptions to confidentiality, use of automated medical record, [address] staff access to medical record, and consent to treatment reviewed

## 2020-05-22 NOTE — PROGRESS NOTES
05/22/20 [facility]   Team Meeting   Meeting Type Daily Rounds   Team Members Present   Team Members Present Physician;;Nurse;   Physician Team Member Dr Shanna Carballo Team Member Khloe Chatman, RN   Care Management Team Member ASHLYN Alarcon   Social Work Team Member Josse Mcfarland LCSW     Last shower 5/5  EKG done   Eating; 50, 0, 50

## 2020-05-22 NOTE — PLAN OF CARE
Problem: Alteration in Thoughts and Perception  Goal: Agree to be compliant with medication regime, as prescribed and report medication side effects  Description  Interventions:  - Offer appropriate PRN medication and supervise ingestion; conduct aims, as needed   Outcome: Progressing     Problem: Risk for Self Injury/Neglect  Goal: Verbalize thoughts and feelings  Description  Interventions:  - Assess and re-assess patient's lethality and potential for self-injury  - Engage patient in 1:1 interactions, daily, for a minimum of 15 minutes  - Encourage patient to express feelings, fears, frustrations, hopes  - Establish rapport/trust with patient   Outcome: Progressing     Problem: Risk for Self Injury/Neglect  Goal: Attend and participate in unit activities, including therapeutic, recreational, and educational groups  Description  Interventions:  - Provide therapeutic and educational activities daily, encourage attendance and participation, and document same in the medical record  - Obtain collateral information, encourage visitation and family involvement in care   Outcome: Not Progressing     Problem: Risk for Self Injury/Neglect  Goal: Attend and participate in unit activities, including therapeutic, recreational, and educational groups  Description  Interventions:  - Provide therapeutic and educational activities daily, encourage attendance and participation, and document same in the medical record  - Obtain collateral information, encourage visitation and family involvement in care   Outcome: Not Progressing     Problem: Risk for Self Injury/Neglect  Goal: Attend and participate in unit activities, including therapeutic, recreational, and educational groups  Description  Interventions:  - Provide therapeutic and educational activities daily, encourage attendance and participation, and document same in the medical record  - Obtain collateral information, encourage visitation and family involvement in care Outcome: Not Progressing       Ate 50% of dinner +ensure for dinner, stated that she is planning to shower tomorrow, did not attend groups, visible for medications, dinner and snack, social with select peers, will continue to monitor

## 2020-05-22 NOTE — PROGRESS NOTES
05/22/20 1100   Activity/Group Checklist   Group Other (Comment)  ([facility] - [employer])   Attendance Did not attend     Patient encouraged to attend group, but declined  Patient in bed when prompted

## 2020-05-22 NOTE — PROGRESS NOTES
Progress Note - Behavioral Health     Sen Garvey 60 y o  female MRN: [id number]   Unit/Bed#: [facility] 111-01 Encounter: [id number]    Per Nursing: Nursing reports that patient has continued to show poor motivation and does not put effort into her grooming  He stays in her room for the majority of the day and emerges for meals  She refused her incentive spirometry last evening  Per Patient: Patient states that she feels "disrespected and upset " She states that she asked to have her pulse oximetry taken this morning because she has a "lung condition" and she was denied, and she feels "discriminated against " She states that she feels as though she is being "mistreated " She states that she feels "paranoid " She wants to know if she would be helped if she was choking on the unit  She states that her Zoloft was increased and she feels more depressed  She speaks at length about the death of her fiance in the past, as well as her history of post-partum depression  She states that all she wants to do is eat and sleep  She states that she doesn't know why she is feeling the way that she does  She denies auditory or visual hallucinations  She admits to anxiety and depression  She denies active or passive suicidal ideation, intent or plan  She states, "I'm beginning to think someone is putting something in the humidifier or the medicine or the food," because she continues to feel depressed and she doesn't know why  Behavior over the last 24 hours: unchanged  Sleep: normal  Appetite: normal  Medication side effects: No   ROS: no complaints, all other systems are negative  Any positives in the Comprehensive Review of Systems were noted in the HPI  All other Review of Systems were negative          Mental Status Evaluation:    Appearance:  disheveled, marginal hygiene   Behavior:  bizarre, demanding, irritable, depressed   Speech:  increased rate, hypertalkative, tangential, perseverative   Mood:  depressed, anxious, irritable, angry, labile   Affect:  labile, reactive   Thought Process:  tangential, perseverative   Associations: tangential associations, perseverative   Thought Content:  some paranoia, negative thinking, intrusive thoughts, ruminating thoughts, persecutory delusions   Perceptual Disturbances: denies auditory hallucinations when asked   Risk Potential: Suicidal ideation - None at present  Homicidal ideation - None at present  Potential for aggression - Not at present   Sensorium:  oriented to person, place and time/date   Memory:  recent and remote memory grossly intact   Consciousness:  alert and awake   Attention: attention span and concentration are age appropriate   Insight:  limited   Judgment: limited   Gait/Station: normal gait/station   Motor Activity: no abnormal movements     Vital signs in last 24 hours:  Vitals:    05/25/20 0747   BP: 98/60   Pulse: 69   Resp:    Temp:    SpO2:          Laboratory results:   I have personally reviewed all pertinent laboratory/tests results    Most Recent Labs:   Lab Results   Component Value Date    WBC 8 70 05/22/2020    RBC 4 70 05/22/2020    HGB 15 0 05/22/2020    HCT 44 4 05/22/2020     05/22/2020    RDW 14 6 05/22/2020    NEUTROABS 5 20 05/22/2020    SODIUM 139 04/20/2020    K 3 9 04/20/2020     04/20/2020    CO2 29 04/20/2020    BUN 18 04/20/2020    CREATININE 0 69 04/20/2020    GLUC 88 04/20/2020    GLUF 88 04/20/2020    CALCIUM 9 2 04/20/2020    AST 18 02/29/2020    ALT 25 02/29/2020    ALKPHOS 116 02/29/2020    TP 7 4 02/29/2020    ALB 4 1 02/29/2020    TBILI 0 50 02/29/2020    CHOLESTEROL 210 (H) 10/25/2019    HDL 38 (L) 10/25/2019    TRIG 134 10/25/2019    LDLCALC 145 (H) 10/25/2019    [place] 172 10/25/2019    LITHIUM <0 2 (L) 05/01/2019    AMMONIA 13 02/22/2016    JMZ9LGINBATG 2 230 02/29/2020    FREET4 1 4 05/12/2015    RPR Non-Reactive 05/02/2019    HGBA1C 5 5 01/17/2019     01/17/2019       Progress Toward Goals: progressing    Assessment/Plan   Principal Problem:    Schizoaffective disorder, bipolar type (HCC)  Active Problems:    COPD with asthma ([address] )    Acquired hypothyroidism    Gastroesophageal reflux disease without esophagitis    At risk for aspiration    Ear ache    Tachycardia    Chest pain    Low HDL (under 40)    Recommended Treatment:     Planned medication and treatment changes:     All current active medications have been reviewed  Encourage group therapy, milieu therapy and occupational therapy  Behavioral Health checks every 7 minutes  Continue current medications:    Current Facility-Administered Medications:  acetaminophen 325 mg Oral Q6H PRN Zac Sierra MD   acetaminophen 650 mg Oral Q6H PRN Zac Sierra MD   acetaminophen 650 mg Oral Q8H PRN Zac Sierra MD   albuterol 2 puff Inhalation Q4H PRN Zac Sierra MD   aluminum-magnesium hydroxide-simethicone 15 mL Oral Q4H PRN Zac Sierra MD   ammonium lactate 1 application Topical BID PRN Zac Sierra MD   ARIPiprazole 2 mg Oral Daily Zac Sierra MD   benzonatate 100 mg Oral TID PRN Zac Sierra MD   benztropine 1 mg Intramuscular Q8H PRN Zac Sierra MD   carbamide peroxide 5 drop Left Ear BID PRN Zac Sierra MD   cloZAPine 25 mg Oral BID Zac Sierra MD   cloZAPine 50 mg Oral BID Zac Sierra MD   docusate sodium 100 mg Oral BID PRN Zac Sierra MD   EPINEPHrine PF 0 15 mg Intramuscular Once PRN Zac Sierra MD   fluticasone-vilanterol 1 puff Inhalation Daily Zac Sierra MD   ketotifen 1 drop Right Eye BID PRN Zac Sierra MD   levothyroxine 125 mcg Oral Early Morning Zac Sierra MD   magnesium hydroxide 30 mL Oral Daily PRN Zac Sierra MD   montelukast 10 mg Oral HS Zac Sierra MD   OLANZapine 5 mg Intramuscular Q8H PRN Zac Sierra MD   OLANZapine 5 mg Oral Q8H PRN Zac Sierra MD   ondansetron 4 mg Oral Q6H PRN Zac Sierra MD   pantoprazole 40 mg Oral Early Morning Zac Sierra MD   polyethylene glycol 17 g Oral Daily PRN Zac Sierra MD polyvinyl alcohol 1 drop Both Eyes Q3H PRN Alirio Frazier MD   sertraline 200 mg Oral Daily Alirio Frazier MD   sucralfate 1,000 mg Oral BID Alirio Frazier MD   theophylline 200 mg Oral Daily Alirio Frazier MD   tiotropium 18 mcg Inhalation Daily Alirio Frazier MD   traZODone 25 mg Oral HS PRN Alirio Frazier MD           Plan:  1) Patient appears to remain delusional and paranoid, and is expressing persecutory delusions that there is "something being put in the humidifier, medicine or food," to explain her persisting depression  The patient speaks tangentially and is irritable throughout the conversation  She continues to refuse the Abilify  Will continue to monitor on the unit  2) Continue all current medications as directed:    Abilify 2 mg once daily by mouth  o Patient continues to refuse Abilify   clozaril 75 mg twice daily by mouth   Zoloft 200 mg once daily by mouth  o Recently titrated from 175 mg  3) Medical   All medical comorbidities are under the care of [facility]  4) Continue to work with Case Management to determine patient's disposition following discharge  Risks / Benefits of Treatment:    Risks, benefits, and possible side effects of medications explained to patient  Patient does not verbalize understanding of benefits or risks of treatment refusal at this time and needs ongoing explanation of treatment benefits and treatment plan  Counseling / Coordination of Care:    Patient's progress reviewed with nursing staff  Medications, treatment progress and treatment plan reviewed with patient      Capri Vargas PA-C 05/25/20

## 2020-05-22 NOTE — PROGRESS NOTES
05/22/20 0900   Activity/Group Checklist   Group Community meeting  (Morning Walk)   Attendance Did not attend     Patient was encouraged to attend morning walk, but declined  She was in bed when prompted

## 2020-05-22 NOTE — TREATMENT TEAM
Pt did not attend group when prompted or offered     Pt pleasant and did wish group facilitator a [facility] Day as left unit        05/22/20 1500   Activity/Group Checklist   Group Other (Comment)  (Relaxation and goal setting)   Attendance Did not attend

## 2020-05-22 NOTE — PLAN OF CARE
Problem: Alteration in Thoughts and Perception  Goal: Verbalize thoughts and feelings  Description  Interventions:  - Promote a nonjudgmental and trusting relationship with the patient through active listening and therapeutic communication  - Assess patient's level of functioning, behavior and potential for risk  - Engage patient in 1 on 1 interactions for a minimum of 15 minutes each session  - Encourage patient to express fears, feelings, frustrations, and discuss symptoms    - [place] patient to reality, help patient recognize reality-based thinking   - Administer medications as ordered and assess for potential side effects  - Provide the patient education related to the signs and symptoms of the illness and desired effects of prescribed medications  Outcome: Progressing     Problem: Risk for Self Injury/Neglect  Goal: Treatment Goal: Remain safe during length of stay, learn and adopt new coping skills, and be free of self-injurious ideation, impulses and acts at the time of discharge  Outcome: Progressing  Goal: Verbalize thoughts and feelings  Description  Interventions:  - Assess and re-assess patient's lethality and potential for self-injury  - Engage patient in 1:1 interactions, daily, for a minimum of 15 minutes  - Encourage patient to express feelings, fears, frustrations, hopes  - Establish rapport/trust with patient   Outcome: Progressing  Goal: Refrain from harming self  Description  Interventions:  - Monitor patient closely, per order  - Develop a trusting relationship  - Supervise medication ingestion, monitor effects and side effects   Outcome: Progressing     Problem: Depression  Goal: Verbalize thoughts and feelings  Description  Interventions:  - Assess and re-assess patient's level of risk   - Engage patient in 1:1 interactions, daily, for a minimum of 15 minutes   - Encourage patient to express feelings, fears, frustrations, hopes   Outcome: Progressing     Problem: Anxiety  Goal: Anxiety is at manageable level  Description  Interventions:  - Assess and monitor patient's anxiety level  - Monitor for signs and symptoms of anxiety both physical and emotional (heart palpitations, chest pain, shortness of breath, headaches, nausea, feeling jumpy, restlessness, irritable, apprehensive)  - Collaborate with interdisciplinary team and initiate plan and interventions as ordered    - [place] patient to unit/surroundings  - Explain treatment plan  - Encourage participation in care  - Encourage verbalization of concerns/fears  - Identify coping mechanisms  - Assist in developing anxiety-reducing skills  - Administer/offer alternative therapies  - Limit or eliminate stimulants  Outcome: Progressing     Problem: Alteration in Orientation  Goal: Interact with staff daily  Description  Interventions:  - Assess and re-assess patient's level of orientation  - Engage patient in 1 on 1 interactions, daily, for a minimum of 15 minutes   - Establish rapport/trust with patient   Outcome: Progressing  Goal: Cooperate with recommended testing/procedures  Description  Interventions:  - Determine need for ancillary testing  - Observe for mental status changes  - Implement falls/precaution protocol   Outcome: Progressing     Problem: PAIN - ADULT  Goal: Verbalizes/displays adequate comfort level or baseline comfort level  Description  Interventions:  - Encourage patient to monitor pain and request assistance  - Assess pain using appropriate pain scale  - Administer analgesics based on type and severity of pain and evaluate response  - Implement non-pharmacological measures as appropriate and evaluate response  - Consider cultural and social influences on pain and pain management  - Notify physician/advanced practitioner if interventions unsuccessful or patient reports new pain  Outcome: Progressing     Problem: SAFETY ADULT  Goal: Patient will remain free of falls  Description  INTERVENTIONS:  - Assess patient frequently for physical needs  -  Identify cognitive and physical deficits and behaviors that affect risk of falls  -  [place] fall precautions as indicated by assessment   - Educate patient/family on patient safety including physical limitations  - Instruct patient to call for assistance with activity based on assessment  - Modify environment to reduce risk of injury  - Consider OT/PT consult to assist with strengthening/mobility  Outcome: Progressing     Problem: Alteration in Thoughts and Perception  Goal: Treatment Goal: Gain control of psychotic behaviors/thinking, reduce/eliminate presenting symptoms and demonstrate improved reality functioning upon discharge  Outcome: Not Progressing  Goal: Agree to be compliant with medication regime, as prescribed and report medication side effects  Description  Interventions:  - Offer appropriate PRN medication and supervise ingestion; conduct aims, as needed   Outcome: Not Progressing  Goal: Attend and participate in unit activities, including therapeutic, recreational, and educational groups  Description  Interventions:  - Provide therapeutic and educational activities daily, encourage attendance and participation, and document same in the medical record     CERTIFIED PEER SPECIALIST INTERVENTIONS:    Complete peer assessment with patient to assess their needs and identify their goals to complete while in the recovery program as well as once discharged into the community  Patient will complete WRAP Plan, Crisis Plan and 5 Life Domains  Patient will attend 50% of groups offered on the unit  Patient will complete a goal card weekly      Outcome: Not Progressing  Goal: Recognize dysfunctional thoughts, communicate reality-based thoughts at the time of discharge  Description  Interventions:  - Provide medication and psycho-education to assist patient in compliance and developing insight into his/her illness   Outcome: Not Progressing  Goal: Complete daily ADLs, including personal hygiene independently, as able  Description  Interventions:  - Observe, teach, and assist patient with ADLS  - Monitor and promote a balance of rest/activity, with adequate nutrition and elimination   Outcome: Not Progressing     Problem: Ineffective Coping  Goal: Identifies ineffective coping skills  Outcome: Not Progressing  Goal: Identifies healthy coping skills  Outcome: Not Progressing  Goal: Demonstrates healthy coping skills  Outcome: Not Progressing  Goal: Participates in unit activities  Description  Interventions:  - Provide therapeutic environment   - Provide required programming   - Redirect inappropriate behaviors   Outcome: Not Progressing     Problem: Risk for Self Injury/Neglect  Goal: Attend and participate in unit activities, including therapeutic, recreational, and educational groups  Description  Interventions:  - Provide therapeutic and educational activities daily, encourage attendance and participation, and document same in the medical record  - Obtain collateral information, encourage visitation and family involvement in care   Outcome: Not Progressing  Goal: Recognize maladaptive responses and adopt new coping mechanisms  Outcome: Not Progressing  Goal: Complete daily ADLs, including personal hygiene independently, as able  Description  Interventions:  - Observe, teach, and assist patient with ADLS  - Monitor and promote a balance of rest/activity, with adequate nutrition and elimination  Outcome: Not Progressing     Problem: Depression  Goal: Treatment Goal: Demonstrate behavioral control of depressive symptoms, verbalize feelings of improved mood/affect, and adopt new coping skills prior to discharge  Outcome: Not Progressing  Goal: Refrain from isolation  Description  Interventions:  - Develop a trusting relationship   - Encourage socialization   Outcome: Not Progressing  Goal: Refrain from self-neglect  Outcome: Not Progressing     Problem: Nutrition/Hydration-ADULT  Goal: Nutrient/Hydration intake appropriate for improving, restoring or maintaining nutritional needs  Description  Monitor and assess patient's nutrition/hydration status for malnutrition  Collaborate with interdisciplinary team and initiate plan and interventions as ordered  Monitor patient's weight and dietary intake as ordered or per policy  Utilize nutrition screening tool and intervene as necessary  Determine patient's food preferences and provide high-protein, high-caloric foods as appropriate  INTERVENTIONS:  - Monitor oral intake, urinary output, labs, and treatment plans  - Assess nutrition and hydration status and recommend course of action  - Evaluate amount of meals eaten  - Assist patient with eating if necessary   - Allow adequate time for meals  - Recommend/ encourage appropriate diets, oral nutritional supplements, and vitamin/mineral supplements  - Order, calculate, and assess calorie counts as needed  - Recommend, monitor, and adjust tube feedings and TPN/PPN based on assessed needs  - Assess need for intravenous fluids  - Provide specific nutrition/hydration education as appropriate  - Include patient/family/caregiver in decisions related to nutrition  Outcome: Not Progressing   Mostly isolative to her bed in her room, out for meds and meals, did not attend groups  Ate 50% of breakfast and 25% of lunch  Continues to refuse scheduled Abilify and was suspicious with increase in Zoloft, but took with reassurance  Compliant with allowing staff to draw her blood for ordered labs  No other behaviors or issues noted  Continue to monitor

## 2020-05-22 NOTE — PROGRESS NOTES
Maggie maintained on ongoing fall and SAFE precaution  Lequita Nip in bed with eyes closed, breath even and unlabored   On O2 with humidifier @1L/m via nasal cannula  Continues rounding implemented   No somatic complaint overnight  No PRN needed for sleep aid   No indication of pain or discomfort   Will continue to monitor

## 2020-05-23 NOTE — PROGRESS NOTES
Psychiatry Progress Note    Subjective: Interval History     The patient is lying in bed this morning  She continues to be withdrawn  She continues to be somatic at times  Staff reports she was refusing to shower however did shower yesterday  Patient reports feeling tired  She is refusing Abilify stating she read about the adverse reactions and is not the right medication for her  Insight is poor into her mental health needs  Patient has been compliant with her medications  Appetite varies  She denies hallucinations or suicidal ideation      Behavior over the last 24 hours:  unchanged  Sleep: normal  Appetite: fair  Medication side effects: No  ROS: no complaints    Current medications:    Current Facility-Administered Medications:     acetaminophen (TYLENOL) tablet 325 mg, 325 mg, Oral, Q6H PRN, Meredith Wesley MD    acetaminophen (TYLENOL) tablet 650 mg, 650 mg, Oral, Q6H PRN, Meredith Wesley MD    acetaminophen (TYLENOL) tablet 650 mg, 650 mg, Oral, Q8H PRN, Meredith Wesley MD    albuterol (PROVENTIL HFA,VENTOLIN HFA) inhaler 2 puff, 2 puff, Inhalation, Q4H PRN, Meredith Wesley MD, 2 puff at 10/11/19 0424    aluminum-magnesium hydroxide-simethicone (MYLANTA) 200-200-20 mg/5 mL oral suspension 15 mL, 15 mL, Oral, Q4H PRN, Meredith Wesley MD, 15 mL at 01/26/20 1021    ammonium lactate (LAC-HYDRIN) 12 % lotion 1 application, 1 application, Topical, BID PRN, Meredith Wesley MD    ARIPiprazole (ABILIFY) tablet 2 mg, 2 mg, Oral, Daily, Meredith Wesley MD    benzonatate (TESSALON PERLES) capsule 100 mg, 100 mg, Oral, TID PRN, Meredith Wesley MD    benztropine (COGENTIN) injection 1 mg, 1 mg, Intramuscular, Q8H PRN, Meredith Wesley MD    carbamide peroxide [facility]) 6 5 % otic solution 5 drop, 5 drop, Left Ear, BID PRN, Meredith Wesley MD, 5 drop at 04/15/20 5481    cloZAPine (CLOZARIL) tablet 25 mg, 25 mg, Oral, BID, Meredith Wesley MD, 25 mg at 05/22/20 2140    cloZAPine (CLOZARIL) tablet 50 mg, 50 mg, Oral, BID, Meredith Wesley MD, 50 mg at 05/22/20 2140    docusate sodium (COLACE) capsule 100 mg, 100 mg, Oral, BID PRN, Meredith Wesley MD    EPINEPHrine PF (ADRENALIN) 1 mg/mL injection 0 15 mg, 0 15 mg, Intramuscular, Once PRN, Meredith Wesley MD    fluticasone-vilanterol (BREO ELLIPTA) 200-25 MCG/INH inhaler 1 puff, 1 puff, Inhalation, Daily, Meredith Wesley MD, 1 puff at 05/23/20 3695    ketotifen (ZADITOR) 0 025 % ophthalmic solution 1 drop, 1 drop, Right Eye, BID PRN, Meredith Wesley MD    levothyroxine tablet 125 mcg, 125 mcg, Oral, Early Morning, Meredith Wesley MD, 125 mcg at 05/23/20 1706    magnesium hydroxide (MILK OF MAGNESIA) 400 mg/5 mL oral suspension 30 mL, 30 mL, Oral, Daily PRN, Meredith Wesley MD    montelukast (SINGULAIR) tablet 10 mg, 10 mg, Oral, HS, Meredith Wesley MD, 10 mg at 02/22/20 2104    OLANZapine (ZyPREXA) IM injection 5 mg, 5 mg, Intramuscular, Q8H PRN, Meredith Wesley MD    OLANZapine (ZyPREXA) tablet 5 mg, 5 mg, Oral, Q8H PRN, Meredith Wesley MD    ondansetron (ZOFRAN-ODT) dispersible tablet 4 mg, 4 mg, Oral, Q6H PRN, Meredith Wesley MD, 4 mg at 12/09/19 1757    pantoprazole (PROTONIX) EC tablet 40 mg, 40 mg, Oral, Early Morning, Meredith Wesley MD, 40 mg at 05/23/20 3969    polyethylene glycol (MIRALAX) packet 17 g, 17 g, Oral, Daily PRN, Meredith Wesley MD    polyvinyl alcohol (LIQUIFILM TEARS) 1 4 % ophthalmic solution 1 drop, 1 drop, Both Eyes, Q3H PRN, Meredith Wesley MD    sertraline (ZOLOFT) tablet 200 mg, 200 mg, Oral, Daily, Meredith Wesley MD, 200 mg at 05/23/20 7956    sucralfate (CARAFATE) oral suspension 1,000 mg, 1,000 mg, Oral, BID, Meredith Wesley MD, 1,000 mg at 05/23/20 9490    theophylline (JEF-24) 24 hr capsule 200 mg, 200 mg, Oral, Daily, Meredith Wesley MD, 200 mg at 05/23/20 1697    tiotropium [facility]) capsule for inhaler 18 mcg, 18 mcg, Inhalation, Daily, Meredith eWsley MD, 18 mcg at 05/23/20 1381    traZODone (DESYREL) tablet 25 mg, 25 mg, Oral, HS PRN, Meredith Wesley MD    Current Problem List:    Patient Active Problem List   Diagnosis    COPD with asthma ([facility] )    Tobacco use disorder, continuous    Compression fracture of L4 lumbar vertebra    Ventral hernia    Acute on chronic respiratory failure with hypoxia (HCC)    Schizoaffective disorder, bipolar type ([facility] )    Acquired hypothyroidism    Gastroesophageal reflux disease without esophagitis    Abnormal CT of the chest    Excessive cerumen in left ear canal    Lipoma of right upper extremity    Noncompliant with deep vein thrombosis (DVT) prophylaxis    At risk for aspiration    Ear ache    Tachycardia    Chest pain    Low HDL (under 40)       Problem list reviewed 05/23/20     Objective:     Vital Signs:  Vitals:    05/22/20 0730 05/22/20 2000 05/23/20 0600 05/23/20 0700   BP: (!) 79/54 107/67 122/76 119/77   BP Location: Right arm Right arm Left arm Left arm   Pulse: 62 84 91 74   Resp: 16 16 14 20   Temp: (!) 97 2 °F (36 2 °C) (!) 97 °F (36 1 °C) 98 °F (36 7 °C) 97 5 °F (36 4 °C)   TempSrc:  Temporal Temporal Temporal   SpO2:  91% 99% 94%   Weight:       Height:             Appearance:  age appropriate and casually dressed   Behavior:  normal   Speech:  soft   Mood:  anxious   Affect:  increased in intensity   Thought Process:  circumstantial   Thought Content:  delusions  persecutory   Perceptual Disturbances: None   Risk Potential: none   Sensorium:  person, place, situation and time   Cognition:  intact   Consciousness:  alert and awake    Attention: attention span and concentration were age appropriate   Intellect: average   Insight:  limited   Judgment: limited      Motor Activity: no abnormal movements       I/O Past 24 hours:  No intake/output data recorded  No intake/output data recorded  Labs:  Reviewed 05/23/20    Assessment / Plan:     Schizoaffective disorder, bipolar type ([facility] )    Recommended Treatment:      Medication changes:  1) continue current medication regimen      Non-pharmacological treatments  1) Continue with group therapy, milieu therapy and occupational therapy  Safety  1) Safety/communication plan established targeting dynamic risk factors above  2) Risks, benefits, and possible side effects of medications explained to patient and patient verbalizes understanding  Counseling / Coordination of Care    Total floor / unit time spent today 20 minutes  Greater than 50% of total time was spent with the patient and / or family counseling and / or coordination of care  A description of the counseling / coordination of care  Patient's Rights, confidentiality and exceptions to confidentiality, use of automated medical record, [facility] [facility] staff access to medical record, and consent to treatment reviewed      Ernestene Blizzard, PA-C
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
## 2020-05-23 NOTE — PLAN OF CARE
Problem: Alteration in Thoughts and Perception  Goal: Agree to be compliant with medication regime, as prescribed and report medication side effects  Description  Interventions:  - Offer appropriate PRN medication and supervise ingestion; conduct aims, as needed   Outcome: Progressing     Problem: Alteration in Thoughts and Perception  Goal: Attend and participate in unit activities, including therapeutic, recreational, and educational groups  Description  Interventions:  - Provide therapeutic and educational activities daily, encourage attendance and participation, and document same in the medical record     CERTIFIED PEER SPECIALIST INTERVENTIONS:    Complete peer assessment with patient to assess their needs and identify their goals to complete while in the recovery program as well as once discharged into the community  Patient will complete WRAP Plan, Crisis Plan and 5 Life Domains  Patient will attend 50% of groups offered on the unit  Patient will complete a goal card weekly  Outcome: Not Progressing     Problem: Depression  Goal: Refrain from isolation  Description  Interventions:  - Develop a trusting relationship   - Encourage socialization   Outcome: Not Progressing     Problem: Nutrition/Hydration-ADULT  Goal: Nutrient/Hydration intake appropriate for improving, restoring or maintaining nutritional needs  Description  Monitor and assess patient's nutrition/hydration status for malnutrition  Collaborate with interdisciplinary team and initiate plan and interventions as ordered  Monitor patient's weight and dietary intake as ordered or per policy  Utilize nutrition screening tool and intervene as necessary  Determine patient's food preferences and provide high-protein, high-caloric foods as appropriate       INTERVENTIONS:  - Monitor oral intake, urinary output, labs, and treatment plans  - Assess nutrition and hydration status and recommend course of action  - Evaluate amount of meals eaten  - Assist patient with eating if necessary   - Allow adequate time for meals  - Recommend/ encourage appropriate diets, oral nutritional supplements, and vitamin/mineral supplements  - Order, calculate, and assess calorie counts as needed  - Recommend, monitor, and adjust tube feedings and TPN/PPN based on assessed needs  - Assess need for intravenous fluids  - Provide specific nutrition/hydration education as appropriate  - Include patient/family/caregiver in decisions related to nutrition  Outcome: Not Progressing     1943 Kathrine Cabello is isolative to her room & bed dozing  She has come out for supper medicine, to eat 25% of her meal (bites egg salad) & Ensure, to have VS now  She is pleasant, but, a bit suspicious toward others  Has not responded to invitations to evening programming offered tonight

## 2020-05-23 NOTE — PLAN OF CARE
Problem: Alteration in Thoughts and Perception  Goal: Verbalize thoughts and feelings  Description  Interventions:  - Promote a nonjudgmental and trusting relationship with the patient through active listening and therapeutic communication  - Assess patient's level of functioning, behavior and potential for risk  - Engage patient in 1 on 1 interactions for a minimum of 15 minutes each session  - Encourage patient to express fears, feelings, frustrations, and discuss symptoms    - [place] patient to reality, help patient recognize reality-based thinking   - Administer medications as ordered and assess for potential side effects  - Provide the patient education related to the signs and symptoms of the illness and desired effects of prescribed medications  Outcome: Progressing  Goal: Complete daily ADLs, including personal hygiene independently, as able  Description  Interventions:  - Observe, teach, and assist patient with ADLS  - Monitor and promote a balance of rest/activity, with adequate nutrition and elimination   Outcome: Progressing     Problem: Anxiety  Goal: Anxiety is at manageable level  Description  Interventions:  - Assess and monitor patient's anxiety level  - Monitor for signs and symptoms of anxiety both physical and emotional (heart palpitations, chest pain, shortness of breath, headaches, nausea, feeling jumpy, restlessness, irritable, apprehensive)  - Collaborate with interdisciplinary team and initiate plan and interventions as ordered    - [place] patient to unit/surroundings  - Explain treatment plan  - Encourage participation in care  - Encourage verbalization of concerns/fears  - Identify coping mechanisms  - Assist in developing anxiety-reducing skills  - Administer/offer alternative therapies  - Limit or eliminate stimulants  Outcome: Progressing     Problem: Alteration in Orientation  Goal: Interact with staff daily  Description  Interventions:  - Assess and re-assess patient's level of orientation  - Engage patient in 1 on 1 interactions, daily, for a minimum of 15 minutes   - Establish rapport/trust with patient   Outcome: Progressing     Problem: Alteration in Thoughts and Perception  Goal: Attend and participate in unit activities, including therapeutic, recreational, and educational groups  Description  Interventions:  - Provide therapeutic and educational activities daily, encourage attendance and participation, and document same in the medical record     CERTIFIED PEER SPECIALIST INTERVENTIONS:    Complete peer assessment with patient to assess their needs and identify their goals to complete while in the recovery program as well as once discharged into the community  Patient will complete WRAP Plan, Crisis Plan and 5 Life Domains  Patient will attend 50% of groups offered on the unit  Patient will complete a goal card weekly  Outcome: Not Progressing     Problem: Depression  Goal: Refrain from isolation  Description  Interventions:  - Develop a trusting relationship   - Encourage socialization   Outcome: Not Ileana Abt has been in her room a majority of the day  Only came out for meals and medication  Ate 75% of breakfast and 10% of lunch  Took medication without distress  No swallowing issue  No somatic complaints  No shower or BM today  Lacks motivation  Did not attend any groups  Continue to monitor  Precautions maintained

## 2020-05-23 NOTE — PROGRESS NOTES
Maggie maintained on ongoing fall and SAFE precaution  Luiz Coffey in bed with eyes closed, breath even and unlabored   On O2 with humidifier @1L/m via nasal cannula  Continues rounding implemented   No somatic complaint overnight  No PRN needed for sleep aid   No indication of pain or discomfort   Will continue to monitor

## 2020-05-23 NOTE — PROGRESS NOTES
[facility] declined liveBooks as feeling too tired from her earlier shower efforts  She did come out for HS snack, had HS medicine except the Singulair  Wearing now her QHS humidified nasal O2 @ 1L for bed

## 2020-05-24 NOTE — PROGRESS NOTES
Psychiatry Progress Note    Subjective: Interval History     The patient is sitting out on the unit this morning  She did come out for vitals  Staff states she is often in bed  Patient reports sleeping well at night  She states she is tired during the day  She has been compliant with her medications  Her appetite does vary  She is not offering any somatic complaints this morning  She has not been attending groups  Pt denies SI, hallucinations       Behavior over the last 24 hours:  unchanged  Sleep: normal  Appetite: fair  Medication side effects: No  ROS: no complaints    Current medications:    Current Facility-Administered Medications:     acetaminophen (TYLENOL) tablet 325 mg, 325 mg, Oral, Q6H PRN, Deedee Muir MD    acetaminophen (TYLENOL) tablet 650 mg, 650 mg, Oral, Q6H PRN, Deedee Muir MD    acetaminophen (TYLENOL) tablet 650 mg, 650 mg, Oral, Q8H PRN, Deedee Muir MD    albuterol (PROVENTIL HFA,VENTOLIN HFA) inhaler 2 puff, 2 puff, Inhalation, Q4H PRN, Deedee Muir MD, 2 puff at 10/11/19 0424    aluminum-magnesium hydroxide-simethicone (MYLANTA) 200-200-20 mg/5 mL oral suspension 15 mL, 15 mL, Oral, Q4H PRN, Deedee Muir MD, 15 mL at 01/26/20 1021    ammonium lactate (LAC-HYDRIN) 12 % lotion 1 application, 1 application, Topical, BID PRN, Deedee Muir MD    ARIPiprazole (ABILIFY) tablet 2 mg, 2 mg, Oral, Daily, Deedee Muir MD    benzonatate (TESSALON PERLES) capsule 100 mg, 100 mg, Oral, TID PRN, Deedee Muir MD    benztropine (COGENTIN) injection 1 mg, 1 mg, Intramuscular, Q8H PRN, Deedee Muir MD    carbamide peroxide [facility]) 6 5 % otic solution 5 drop, 5 drop, Left Ear, BID PRN, Deedee Muir MD, 5 drop at 04/15/20 2128    cloZAPine (CLOZARIL) tablet 25 mg, 25 mg, Oral, BID, Deedee Muir MD, 25 mg at 05/23/20 2200    cloZAPine (CLOZARIL) tablet 50 mg, 50 mg, Oral, BID, Deedee Muir MD, 50 mg at 05/23/20 2201    docusate sodium (COLACE) capsule 100 mg, 100 mg, Oral, BID PRN, [place] Shena Yancey MD    EPINEPHrine PF (ADRENALIN) 1 mg/mL injection 0 15 mg, 0 15 mg, Intramuscular, Once PRN, Ervin Little MD    fluticasone-vilanterol (BREO ELLIPTA) 200-25 MCG/INH inhaler 1 puff, 1 puff, Inhalation, Daily, Ervin Little MD, 1 puff at 05/24/20 0852    ketotifen (ZADITOR) 0 025 % ophthalmic solution 1 drop, 1 drop, Right Eye, BID PRN, Ervin Little MD    levothyroxine tablet 125 mcg, 125 mcg, Oral, Early Morning, Ervin Little MD, 125 mcg at 05/24/20 0605    magnesium hydroxide (MILK OF MAGNESIA) 400 mg/5 mL oral suspension 30 mL, 30 mL, Oral, Daily PRN, Ervin Little MD    montelukast (SINGULAIR) tablet 10 mg, 10 mg, Oral, HS, Ervin Little MD, 10 mg at 02/22/20 2104    OLANZapine (ZyPREXA) IM injection 5 mg, 5 mg, Intramuscular, Q8H PRN, Ervin Little MD    OLANZapine (ZyPREXA) tablet 5 mg, 5 mg, Oral, Q8H PRN, Ervin Little MD    ondansetron (ZOFRAN-ODT) dispersible tablet 4 mg, 4 mg, Oral, Q6H PRN, Ervin Little MD, 4 mg at 12/09/19 1757    pantoprazole (PROTONIX) EC tablet 40 mg, 40 mg, Oral, Early Morning, Ervin Little MD, 40 mg at 05/24/20 0605    polyethylene glycol (MIRALAX) packet 17 g, 17 g, Oral, Daily PRN, Ervin Little MD    polyvinyl alcohol (LIQUIFILM TEARS) 1 4 % ophthalmic solution 1 drop, 1 drop, Both Eyes, Q3H PRN, Ervin Little MD    sertraline (ZOLOFT) tablet 200 mg, 200 mg, Oral, Daily, Ervin Little MD, 200 mg at 05/24/20 5554    sucralfate (CARAFATE) oral suspension 1,000 mg, 1,000 mg, Oral, BID, Ervin Little MD, 1,000 mg at 05/24/20 0605    theophylline (JEF-24) 24 hr capsule 200 mg, 200 mg, Oral, Daily, Ervin Little MD, 200 mg at 05/24/20 2734    tiotropium [facility]) capsule for inhaler 18 mcg, 18 mcg, Inhalation, Daily, Ervin Little MD, 18 mcg at 05/24/20 9492    traZODone (DESYREL) tablet 25 mg, 25 mg, Oral, HS PRN, Ervin Little MD    Current Problem List:    Patient Active Problem List   Diagnosis    COPD with asthma ([facility] )    Tobacco use disorder, continuous    Compression fracture of L4 lumbar vertebra    Ventral hernia    Acute on chronic respiratory failure with hypoxia (HCC)    Schizoaffective disorder, bipolar type (HCC)    Acquired hypothyroidism    Gastroesophageal reflux disease without esophagitis    Abnormal CT of the chest    Excessive cerumen in left ear canal    Lipoma of right upper extremity    Noncompliant with deep vein thrombosis (DVT) prophylaxis    At risk for aspiration    Ear ache    Tachycardia    Chest pain    Low HDL (under 40)       Problem list reviewed 05/24/20     Objective:     Vital Signs:  Vitals:    05/23/20 0700 05/23/20 2000 05/24/20 0800 05/24/20 0802   BP: 119/77 98/62 108/71 107/64   BP Location: Left arm Left arm Right arm Right arm   Pulse: 74 86 87 76   Resp: 20 16 18    Temp: 97 5 °F (36 4 °C) (!) 97 3 °F (36 3 °C) (!) 97 °F (36 1 °C)    TempSrc: Temporal Temporal Temporal    SpO2: 94% 97%     Weight:   64 4 kg (142 lb)    Height:             Appearance:  age appropriate and casually dressed   Behavior:  normal   Speech:  soft   Mood:  anxious   Affect:  increased in intensity   Thought Process:  circumstantial   Thought Content:  delusions  persecutory   Perceptual Disturbances: None   Risk Potential: none   Sensorium:  person, place, situation and time   Cognition:  intact   Consciousness:  alert and awake    Attention: attention span and concentration were age appropriate   Intellect: average   Insight:  limited   Judgment: limited      Motor Activity: no abnormal movements       I/O Past 24 hours:  No intake/output data recorded  No intake/output data recorded  Labs:  Reviewed 05/24/20    Assessment / Plan:     [facility]ffective disorder, bipolar type ([facility]    Recommended Treatment:      Medication changes:  1) continue current medication regimen  Non-pharmacological treatments  1) Continue with group therapy, milieu therapy and occupational therapy      Safety  1) Safety/communication plan established targeting dynamic risk factors above  2) Risks, benefits, and possible side effects of medications explained to patient and patient verbalizes understanding  Counseling / Coordination of Care    Total floor / unit time spent today 20 minutes  Greater than 50% of total time was spent with the patient and / or family counseling and / or coordination of care  A description of the counseling / coordination of care  Patient's Rights, confidentiality and exceptions to confidentiality, use of automated medical record, [facility] staff access to medical record, and consent to treatment reviewed      Matthew Bae PA-C

## 2020-05-24 NOTE — PROGRESS NOTES
[facility] Incentive Spirom[facility] achieving mostly 1250ml volumes, some 1000-1100ml volumes  She came out for HS snack, took her HS medicine except the Singulair  Wanting to "advocate for myself" by calling herself to arrange O2, equipment needed upon discharge  Suggested to her that she advocate for herself by having conversations w/the  & her doctors to confirm that necessary things are arranged  Wearing now her QHS humidified nasal O2 @ 1L for bed

## 2020-05-24 NOTE — PLAN OF CARE
Problem: Alteration in Thoughts and Perception  Goal: Verbalize thoughts and feelings  Description  Interventions:  - Promote a nonjudgmental and trusting relationship with the patient through active listening and therapeutic communication  - Assess patient's level of functioning, behavior and potential for risk  - Engage patient in 1 on 1 interactions for a minimum of 15 minutes each session  - Encourage patient to express fears, feelings, frustrations, and discuss symptoms    - [place] patient to reality, help patient recognize reality-based thinking   - Administer medications as ordered and assess for potential side effects  - Provide the patient education related to the signs and symptoms of the illness and desired effects of prescribed medications  Outcome: Progressing  Goal: Attend and participate in unit activities, including therapeutic, recreational, and educational groups  Description  Interventions:  - Provide therapeutic and educational activities daily, encourage attendance and participation, and document same in the medical record     CERTIFIED PEER SPECIALIST INTERVENTIONS:    Complete peer assessment with patient to assess their needs and identify their goals to complete while in the recovery program as well as once discharged into the community  Patient will complete WRAP Plan, Crisis Plan and 5 Life Domains  Patient will attend 50% of groups offered on the unit  Patient will complete a goal card weekly  Outcome: Progressing  Goal: Complete daily ADLs, including personal hygiene independently, as able  Description  Interventions:  - Observe, teach, and assist patient with ADLS  - Monitor and promote a balance of rest/activity, with adequate nutrition and elimination   Outcome: Progressing     Problem: Anxiety  Goal: Anxiety is at manageable level  Description  Interventions:  - Assess and monitor patient's anxiety level     - Monitor for signs and symptoms of anxiety both physical and emotional (heart palpitations, chest pain, shortness of breath, headaches, nausea, feeling jumpy, restlessness, irritable, apprehensive)  - Collaborate with interdisciplinary team and initiate plan and interventions as ordered  - [place] patient to unit/surroundings  - Explain treatment plan  - Encourage participation in care  - Encourage verbalization of concerns/fears  - Identify coping mechanisms  - Assist in developing anxiety-reducing skills  - Administer/offer alternative therapies  - Limit or eliminate stimulants  Outcome: Progressing     Problem: PAIN - ADULT  Goal: Verbalizes/displays adequate comfort level or baseline comfort level  Description  Interventions:  - Encourage patient to monitor pain and request assistance  - Assess pain using appropriate pain scale  - Administer analgesics based on type and severity of pain and evaluate response  - Implement non-pharmacological measures as appropriate and evaluate response  - Consider cultural and social influences on pain and pain management  - Notify physician/advanced practitioner if interventions unsuccessful or patient reports new pain  Outcome: Progressing     Problem: Depression  Goal: Refrain from isolation  Description  Interventions:  - Develop a trusting relationship   - Encourage socialization   Outcome: Not Progressing     Leeann Alo has been isolative to her room and self most of the day  Minimal interaction with peers  Pleasant and cooperative upon approach by staff  Ate 25% of breakfast and 50% of lunch  No shower or BM today  Did not attend any groups  Lacks motivation  Lays in bed most of the time  Only comes out for medication and meals  Has not been somatic  Continue to monitor  Precautions maintained

## 2020-05-24 NOTE — PROGRESS NOTES
Sleeping at this time, O2 on via NC  No s/s of resp distress  Fall and safe precautions in place and maintained   Monitoring continues

## 2020-05-25 NOTE — PLAN OF CARE
Problem: Alteration in Thoughts and Perception  Goal: Verbalize thoughts and feelings  Description  Interventions:  - Promote a nonjudgmental and trusting relationship with the patient through active listening and therapeutic communication  - Assess patient's level of functioning, behavior and potential for risk  - Engage patient in 1 on 1 interactions for a minimum of 15 minutes each session  - Encourage patient to express fears, feelings, frustrations, and discuss symptoms    - [place] patient to reality, help patient recognize reality-based thinking   - Administer medications as ordered and assess for potential side effects  - Provide the patient education related to the signs and symptoms of the illness and desired effects of prescribed medications  Outcome: Progressing  Goal: Agree to be compliant with medication regime, as prescribed and report medication side effects  Description  Interventions:  - Offer appropriate PRN medication and supervise ingestion; conduct aims, as needed   Outcome: Progressing     Problem: Ineffective Coping  Goal: Patient/Family verbalizes awareness of resources  Outcome: Progressing  Goal: Understands least restrictive measures  Description  Interventions:  - Utilize least restrictive behavior  Outcome: Progressing     Problem: Risk for Self Injury/Neglect  Goal: Treatment Goal: Remain safe during length of stay, learn and adopt new coping skills, and be free of self-injurious ideation, impulses and acts at the time of discharge  Outcome: Progressing  Goal: Verbalize thoughts and feelings  Description  Interventions:  - Assess and re-assess patient's lethality and potential for self-injury  - Engage patient in 1:1 interactions, daily, for a minimum of 15 minutes  - Encourage patient to express feelings, fears, frustrations, hopes  - Establish rapport/trust with patient   Outcome: Progressing  Goal: Refrain from harming self  Description  Interventions:  - Monitor patient closely, per order  - Develop a trusting relationship  - Supervise medication ingestion, monitor effects and side effects   Outcome: Progressing     Problem: Depression  Goal: Verbalize thoughts and feelings  Description  Interventions:  - Assess and re-assess patient's level of risk   - Engage patient in 1:1 interactions, daily, for a minimum of 15 minutes   - Encourage patient to express feelings, fears, frustrations, hopes   Outcome: Progressing     Problem: Anxiety  Goal: Anxiety is at manageable level  Description  Interventions:  - Assess and monitor patient's anxiety level  - Monitor for signs and symptoms of anxiety both physical and emotional (heart palpitations, chest pain, shortness of breath, headaches, nausea, feeling jumpy, restlessness, irritable, apprehensive)  - Collaborate with interdisciplinary team and initiate plan and interventions as ordered    - [place] patient to unit/surroundings  - Explain treatment plan  - Encourage participation in care  - Encourage verbalization of concerns/fears  - Identify coping mechanisms  - Assist in developing anxiety-reducing skills  - Administer/offer alternative therapies  - Limit or eliminate stimulants  Outcome: Progressing     Problem: Alteration in Orientation  Goal: Interact with staff daily  Description  Interventions:  - Assess and re-assess patient's level of orientation  - Engage patient in 1 on 1 interactions, daily, for a minimum of 15 minutes   - Establish rapport/trust with patient   Outcome: Progressing     Problem: PAIN - ADULT  Goal: Verbalizes/displays adequate comfort level or baseline comfort level  Description  Interventions:  - Encourage patient to monitor pain and request assistance  - Assess pain using appropriate pain scale  - Administer analgesics based on type and severity of pain and evaluate response  - Implement non-pharmacological measures as appropriate and evaluate response  - Consider cultural and social influences on pain and pain management  - Notify physician/advanced practitioner if interventions unsuccessful or patient reports new pain  Outcome: Progressing     Problem: SAFETY ADULT  Goal: Patient will remain free of falls  Description  INTERVENTIONS:  - Assess patient frequently for physical needs  -  Identify cognitive and physical deficits and behaviors that affect risk of falls  -  Noti fall precautions as indicated by assessment   - Educate patient/family on patient safety including physical limitations  - Instruct patient to call for assistance with activity based on assessment  - Modify environment to reduce risk of injury  - Consider OT/PT consult to assist with strengthening/mobility  Outcome: Progressing     Problem: Nutrition/Hydration-ADULT  Goal: Nutrient/Hydration intake appropriate for improving, restoring or maintaining nutritional needs  Description  Monitor and assess patient's nutrition/hydration status for malnutrition  Collaborate with interdisciplinary team and initiate plan and interventions as ordered  Monitor patient's weight and dietary intake as ordered or per policy  Utilize nutrition screening tool and intervene as necessary  Determine patient's food preferences and provide high-protein, high-caloric foods as appropriate       INTERVENTIONS:  - Monitor oral intake, urinary output, labs, and treatment plans  - Assess nutrition and hydration status and recommend course of action  - Evaluate amount of meals eaten  - Assist patient with eating if necessary   - Allow adequate time for meals  - Recommend/ encourage appropriate diets, oral nutritional supplements, and vitamin/mineral supplements  - Order, calculate, and assess calorie counts as needed  - Recommend, monitor, and adjust tube feedings and TPN/PPN based on assessed needs  - Assess need for intravenous fluids  - Provide specific nutrition/hydration education as appropriate  - Include patient/family/caregiver in decisions related to nutrition  Outcome: Progressing     Problem: SKIN/TISSUE INTEGRITY - ADULT  Goal: Skin integrity remains intact  Description  INTERVENTIONS  - Identify patients at risk for skin breakdown  - Assess and monitor skin integrity  - Assess and monitor nutrition and hydration status  - Monitor labs (i e  albumin)  - Assess for incontinence   - Turn and reposition patient  - Assist with mobility/ambulation  - Relieve pressure over bony prominences  - Avoid friction and shearing  - Provide appropriate hygiene as needed including keeping skin clean and dry  - Evaluate need for skin moisturizer/barrier cream  - Collaborate with interdisciplinary team (i e  Nutrition, Rehabilitation, etc )   - Patient/family teaching  Outcome: Progressing  Goal: Incision(s), wounds(s) or drain site(s) healing without S/S of infection  Description  INTERVENTIONS  - Assess and document risk factors for skin impairment   - Assess and document dressing, incision, wound bed, drain sites and surrounding tissue  - Consider nutrition services referral as needed  - Oral mucous membranes remain intact  - Provide patient/ family education  Outcome: Progressing     Problem: Alteration in Thoughts and Perception  Goal: Treatment Goal: Gain control of psychotic behaviors/thinking, reduce/eliminate presenting symptoms and demonstrate improved reality functioning upon discharge  Outcome: Not Progressing  Goal: Attend and participate in unit activities, including therapeutic, recreational, and educational groups  Description  Interventions:  - Provide therapeutic and educational activities daily, encourage attendance and participation, and document same in the medical record     CERTIFIED PEER SPECIALIST INTERVENTIONS:    Complete peer assessment with patient to assess their needs and identify their goals to complete while in the recovery program as well as once discharged into the community       Patient will complete WRAP Plan, Crisis Plan and 5 Life Domains  Patient will attend 50% of groups offered on the unit  Patient will complete a goal card weekly      Outcome: Not Progressing  Goal: Recognize dysfunctional thoughts, communicate reality-based thoughts at the time of discharge  Description  Interventions:  - Provide medication and psycho-education to assist patient in compliance and developing insight into his/her illness   Outcome: Not Progressing  Goal: Complete daily ADLs, including personal hygiene independently, as able  Description  Interventions:  - Observe, teach, and assist patient with ADLS  - Monitor and promote a balance of rest/activity, with adequate nutrition and elimination   Outcome: Not Progressing     Problem: Ineffective Coping  Goal: Participates in unit activities  Description  Interventions:  - Provide therapeutic environment   - Provide required programming   - Redirect inappropriate behaviors   Outcome: Not Progressing     Problem: Risk for Self Injury/Neglect  Goal: Attend and participate in unit activities, including therapeutic, recreational, and educational groups  Description  Interventions:  - Provide therapeutic and educational activities daily, encourage attendance and participation, and document same in the medical record  - Obtain collateral information, encourage visitation and family involvement in care   Outcome: Not Progressing  Goal: Recognize maladaptive responses and adopt new coping mechanisms  Outcome: Not Progressing  Goal: Complete daily ADLs, including personal hygiene independently, as able  Description  Interventions:  - Observe, teach, and assist patient with ADLS  - Monitor and promote a balance of rest/activity, with adequate nutrition and elimination  Outcome: Not Progressing     Problem: Depression  Goal: Treatment Goal: Demonstrate behavioral control of depressive symptoms, verbalize feelings of improved mood/affect, and adopt new coping skills prior to discharge  Outcome: Not Progressing  Goal: Refrain from isolation  Description  Interventions:  - Develop a trusting relationship   - Encourage socialization   Outcome: Not Progressing  Goal: Refrain from self-neglect  Outcome: Not Progressing   Mostly isolative to her bed in her room, out for meds and meals, did not attend groups  Ate 100% of breakfast and 75% of lunch  Continues to refuse scheduled Abilify  Took other scheduled meds  Offered no complaints, but voiced paranoid, somatic delusions to PA-C  No other behaviors or issues noted  Continue to monitor

## 2020-05-25 NOTE — PLAN OF CARE
Problem: Alteration in Thoughts and Perception  Goal: Agree to be compliant with medication regime, as prescribed and report medication side effects  Description  Interventions:  - Offer appropriate PRN medication and supervise ingestion; conduct aims, as needed   Outcome: Progressing     Problem: Alteration in Thoughts and Perception  Goal: Attend and participate in unit activities, including therapeutic, recreational, and educational groups  Description  Interventions:  - Provide therapeutic and educational activities daily, encourage attendance and participation, and document same in the medical record     CERTIFIED PEER SPECIALIST INTERVENTIONS:    Complete peer assessment with patient to assess their needs and identify their goals to complete while in the recovery program as well as once discharged into the community  Patient will complete WRAP Plan, Crisis Plan and 5 Life Domains  Patient will attend 50% of groups offered on the unit  Patient will complete a goal card weekly  Outcome: Not Progressing  Goal: Complete daily ADLs, including personal hygiene independently, as able  Description  Interventions:  - Observe, teach, and assist patient with ADLS  - Monitor and promote a balance of rest/activity, with adequate nutrition and elimination   Outcome: Not Progressing     Problem: Depression  Goal: Refrain from isolation  Description  Interventions:  - Develop a trusting relationship   - Encourage socialization   Outcome: Not Progressing     Problem: Nutrition/Hydration-ADULT  Goal: Nutrient/Hydration intake appropriate for improving, restoring or maintaining nutritional needs  Description  Monitor and assess patient's nutrition/hydration status for malnutrition  Collaborate with interdisciplinary team and initiate plan and interventions as ordered  Monitor patient's weight and dietary intake as ordered or per policy  Utilize nutrition screening tool and intervene as necessary   Determine patient's food preferences and provide high-protein, high-caloric foods as appropriate  INTERVENTIONS:  - Monitor oral intake, urinary output, labs, and treatment plans  - Assess nutrition and hydration status and recommend course of action  - Evaluate amount of meals eaten  - Assist patient with eating if necessary   - Allow adequate time for meals  - Recommend/ encourage appropriate diets, oral nutritional supplements, and vitamin/mineral supplements  - Order, calculate, and assess calorie counts as needed  - Recommend, monitor, and adjust tube feedings and TPN/PPN based on assessed needs  - Assess need for intravenous fluids  - Provide specific nutrition/hydration education as appropriate  - Include patient/family/caregiver in decisions related to nutrition  Outcome: Not Progressing     2000 Ernst Moreno is isolative to her room & bed asleep  She today presents as wan, listless, low energy & poor motivation  Disheveled, no effort to do any grooming  Did come out for meal, ate 50% plus Ensure  Took supper medicine  Has not responded to invitations to optional Golgi group or PM Group

## 2020-05-25 NOTE — PROGRESS NOTES
2130 Rito Baez refused doing the [employer]  "I don't really feel like it " Declined HS snack  Needed prompting out of room for HS medicine (taken all but the Singulair)  Wearing now her QHS humidified nasal O2 @ 1L for bed

## 2020-05-26 NOTE — PROGRESS NOTES
Maggie maintained on ongoing fall and SAFE precaution  Dileep Hefty in bed with eyes closed, breath even and unlabored   On O2 with humidifier @1L/m via nasal cannula  Continues rounding implemented   No somatic complaint overnight  No PRN needed for sleep aid   No indication of pain or discomfort   Will continue to monitor

## 2020-05-26 NOTE — PROGRESS NOTES
05/26/20 1044   Team Meeting   Meeting Type Daily Rounds   Team Members Present   Team Members Present Physician;;Nurse; Other (Discipline and Name)   Physician Team Member Dr Palmer Romberg Team Member Xochitl Manning RN   Care Management Team Member ASHLYN Wilkins   Other (Discipline and Name) Margaux Arboleda, CPS     Somatic, poor motivation  Showered Friday

## 2020-05-26 NOTE — TREATMENT TEAM
05/26/20 1400   Activity/Group Checklist   Group   (Recovery Workshop )   Attendance Did not attend   Attendance Duration (min) 46-60   Affect/Mood IRMA

## 2020-05-26 NOTE — TREATMENT TEAM
05/26/20 1100   Activity/Group Checklist   Group   (IMR/Barriers )   Attendance Did not attend   Attendance Duration (min) 46-60   Affect/Mood IRMA

## 2020-05-26 NOTE — PLAN OF CARE
Problem: Alteration in Thoughts and Perception  Goal: Agree to be compliant with medication regime, as prescribed and report medication side effects  Description  Interventions:  - Offer appropriate PRN medication and supervise ingestion; conduct aims, as needed   Outcome: Progressing     Problem: Depression  Goal: Refrain from isolation  Description  Interventions:  - Develop a trusting relationship   - Encourage socialization   Outcome: Progressing     Problem: Alteration in Thoughts and Perception  Goal: Attend and participate in unit activities, including therapeutic, recreational, and educational groups  Description  Interventions:  - Provide therapeutic and educational activities daily, encourage attendance and participation, and document same in the medical record     CERTIFIED PEER SPECIALIST INTERVENTIONS:    Complete peer assessment with patient to assess their needs and identify their goals to complete while in the recovery program as well as once discharged into the community  Patient will complete WRAP Plan, Crisis Plan and 5 Life Domains  Patient will attend 50% of groups offered on the unit  Patient will complete a goal card weekly      Outcome: Not Progressing  Goal: Complete daily ADLs, including personal hygiene independently, as able  Description  Interventions:  - Observe, teach, and assist patient with ADLS  - Monitor and promote a balance of rest/activity, with adequate nutrition and elimination   Outcome: Not Progressing    Ate 10% of dinner +ensure for dinner, did not attend groups, visible, social with select peers, compliant with routine medications,  will continue to monitor

## 2020-05-26 NOTE — PLAN OF CARE
Problem: Alteration in Thoughts and Perception  Goal: Verbalize thoughts and feelings  Description  Interventions:  - Promote a nonjudgmental and trusting relationship with the patient through active listening and therapeutic communication  - Assess patient's level of functioning, behavior and potential for risk  - Engage patient in 1 on 1 interactions for a minimum of 15 minutes each session  - Encourage patient to express fears, feelings, frustrations, and discuss symptoms    - [place] patient to reality, help patient recognize reality-based thinking   - Administer medications as ordered and assess for potential side effects  - Provide the patient education related to the signs and symptoms of the illness and desired effects of prescribed medications  Outcome: Progressing     Problem: Alteration in Thoughts and Perception  Goal: Agree to be compliant with medication regime, as prescribed and report medication side effects  Description  Interventions:  - Offer appropriate PRN medication and supervise ingestion; conduct aims, as needed   Outcome: Not Progressing  Goal: Attend and participate in unit activities, including therapeutic, recreational, and educational groups  Description  Interventions:  - Provide therapeutic and educational activities daily, encourage attendance and participation, and document same in the medical record     CERTIFIED PEER SPECIALIST INTERVENTIONS:    Complete peer assessment with patient to assess their needs and identify their goals to complete while in the recovery program as well as once discharged into the community  Patient will complete WRAP Plan, Crisis Plan and 5 Life Domains  Patient will attend 50% of groups offered on the unit  Patient will complete a goal card weekly      Outcome: Not Progressing     Problem: Ineffective Coping  Goal: Participates in unit activities  Description  Interventions:  - Provide therapeutic environment   - Provide required programming - Redirect inappropriate behaviors   Outcome: Not Progressing     Problem: Nutrition/Hydration-ADULT  Goal: Nutrient/Hydration intake appropriate for improving, restoring or maintaining nutritional needs  Description  Monitor and assess patient's nutrition/hydration status for malnutrition  Collaborate with interdisciplinary team and initiate plan and interventions as ordered  Monitor patient's weight and dietary intake as ordered or per policy  Utilize nutrition screening tool and intervene as necessary  Determine patient's food preferences and provide high-protein, high-caloric foods as appropriate  INTERVENTIONS:  - Monitor oral intake, urinary output, labs, and treatment plans  - Assess nutrition and hydration status and recommend course of action  - Evaluate amount of meals eaten  - Assist patient with eating if necessary   - Allow adequate time for meals  - Recommend/ encourage appropriate diets, oral nutritional supplements, and vitamin/mineral supplements  - Order, calculate, and assess calorie counts as needed  - Recommend, monitor, and adjust tube feedings and TPN/PPN based on assessed needs  - Assess need for intravenous fluids  - Provide specific nutrition/hydration education as appropriate  - Include patient/family/caregiver in decisions related to nutrition  Outcome: Not Progressing   Mostly isolative to her bed in her room, out for meds and meals, did not attend groups  Attended her treatment team meeting  Ate 25% of breakfast and 15% of lunch  Continues to refuse scheduled Abilify  Took other scheduled meds  No other behaviors or issues noted  Continue to monitor

## 2020-05-26 NOTE — PROGRESS NOTES
Psychiatry Progress Note [address] 60 y o  female MRN: [id number]  [facility]99[facility] Encounter: [id number]  Code Status: Level 1 - Full Code    PCP: Richar Bass PA-C    Date of Admission:  7/23/2019 1730   Date of Service:  05/26/20  Patient Active Problem List   Diagnosis    COPD with asthma ([facility] [address] [facility]    Tobacco use disorder, continuous    Compression fracture of L4 lumbar vertebra    Ventral hernia    Acute on chronic respiratory failure with hypoxia ([facility] [address] )    [facility], [facility]    Acquired hypothyroidism    Gastroesophageal reflux disease without esophagitis    Abnormal CT of the chest    Excessive cerumen in left ear canal    Lipoma of right upper extremity    Noncompliant with deep vein thrombosis (DVT) prophylaxis    At risk for aspiration    Ear ache    Tachycardia    Chest pain    Low HDL (under 40)     Diagnosis schizoaffective bipolar  Assessment    Overall Status:  Continues to be poorly groomed preoccupied psychosomatic refusing Abilify becoming suspicious of others tampering with her medications or putting something in her food or her humidifier and feels she has not heard or mistreated and discriminated against    Certification Statement to demonstrate a period of stability by attending to ADLs and becoming less psychosomatic in attending more groups Acceptance by patient:  Accepting  Radha Record in recovery:  Living at another personal care home   Understanding of medications:  Patient is aware about risks side effects benefits and precautions of medications   Involved in reintegration process:   On hold due to [address] in relationship with psychiatrist:  Trusting sometimes    Medication changes   None today  Non-pharmacological treatments   Waiting for your culture and sensitivity report   Continue with individual, group, milieu and occupational therapy using recovery principles and psycho-education about accepting illness and the need for treatment   Encourage to take showers twice a week and cooperate with treatment and adl skills as per her behav  Plan   Therapeutic passes on hold due to covid 19 lock down   Prepare for the [facility] and encouraged to accept  rather than refuse it   Reminded to attend at least 25% of groups on a daily basis including IMR   Encouraged to start taking the Abilify and the increased Zoloft    Safety   Safety and communication plan established to target dynamic risk factors discussed above  Discharge Plan   · [facility] [address]   Patient has been passive-aggressive and did finally took a shower last Friday after several weeks despite promises to the contrary coming a with multiple excuses  She is still suspicious preoccupied and continues to insist that people are tampering with her medications or her oxygen concentrator or putting things in her medicine or food to make him feel tired  She lays in bed, hardly attends groups and feels that she has a lung condition and being discriminated against and being mistreated  She is not amenable to verbal redirection at all times and continues to believe she is going to die from choking on food or from shortness of breath or from heart problems despite constant reassurances to the contrary on a daily basis  She is still refusing to accept the Abilify but is compliant with the rest of the medications including the recent increase in Zoloft  Still not attending all groups and today told us she will try to  From today onwards     Sleep:   Fair  Appetite:   Fair but she picks and chooses her food  Compliance with medications:  Good  Side effects:   none but claims to feel tired sometimes  Review of systems:  Continues to have psychosomatic symptoms     Mental Status Exam  Appearance:    Patient attended team having taken shower las Friday finally, complaining of feeling tired and having no one to help her with a shower, poorly groomed Looks older than her stated age,and not very well kept, with uncombed hairt but  wearing clean clothing   Anxious preoccupied  Has good eye contact   Suspicious in her interaction  Behavior:    Superficially friendly pleasant but anxious suspicious and preoccupied  Speech:    tangential at times with tendency to become stilted   Mood:     anxious irritated times,    Affect:    increased in intensity range mood congruent redirectable  Thought Process:   Circumstantial with tendency to have stilted speech   Thought Content:   Paranoid about motives of staff switching her medications or tampering with her inhalant or her oxygen concentrator off and on  She was even questioning why she was getting an EKG  She also has to examine her pills literally before taking them  No preoccupation with violence or suicide  No current suicidal homicidal thoughts intent or plans reported  No phobias but has obsessions and compulsions about examining her pills before she takes them  Jessica [place] Psychosomatic about dying by choking from food and from heart attack or from shortness of breath and now from catching Covid-19 which are all ongoing beliefs that she has  Perceptual Disturbances:  No longer claims to hear voices   Risk Potential:   Ability to care for herself and refusal to take showers  Sensorium:   fully oriented to place, person, time, date, day, month, year and to situation  Cognition:    Grossly intact, aware of current events like the corona virus situation, recent and remote memory intact     No language deficit  Consciousness:   Alert and awake  Attention and concentration:  fair  Intellect:    average  Insight:    limited  Judgment:    fair  Motor Activity:  No abnormal involuntary movement noted today    Vitals  Temp:  [97 °F (36 1 °C)] 97 °F (36 1 °C)  HR:  [70-93] 93  Resp:  [16-18] 18  BP: ()/(55-70) 126/70  SpO2:  [91 %-95 %] 95 %  No intake or output data in the 24 hours ending 05/26/20 5010    Lab Results: No New Labs Available For Today  Results from last 7 days   Lab Units 05/22/20  0730   WBC Thousand/uL 8 70   RBC Million/uL 4 70   HEMOGLOBIN g/dL 15 0   HEMATOCRIT % 44 4   MCV fL 94   PLATELETS Thousands/uL 188   NEUTROS ABS Thousands/µL 5 20         Current Facility-Administered Medications:  acetaminophen 325 mg Oral Q6H PRN Zetta Killer, MD   acetaminophen 650 mg Oral Q6H PRN Zetta Killer, MD   acetaminophen 650 mg Oral Q8H PRN Zetta Killer, MD   albuterol 2 puff Inhalation Q4H PRN Zetta Killer, MD   aluminum-magnesium hydroxide-simethicone 15 mL Oral Q4H PRN Zetta Killer, MD   ammonium lactate 1 application Topical BID PRN Zetta Killer, MD   ARIPiprazole 2 mg Oral Daily Zetta Killer, MD   benzonatate 100 mg Oral TID PRN Zetta Killer, MD   benztropine 1 mg Intramuscular Q8H PRN Zetta Killer, MD   carbamide peroxide 5 drop Left Ear BID PRN Zetta Killer, MD   cloZAPine 25 mg Oral BID Zetta Killer, MD   cloZAPine 50 mg Oral BID Zetta Killer, MD   docusate sodium 100 mg Oral BID PRN Zetta Killer, MD   EPINEPHrine PF 0 15 mg Intramuscular Once PRN Zetta Killer, MD   fluticasone-vilanterol 1 puff Inhalation Daily Zetta Killer, MD   ketotifen 1 drop Right Eye BID PRN Zetta Killer, MD   levothyroxine 125 mcg Oral Early Morning Zetta Killer, MD   magnesium hydroxide 30 mL Oral Daily PRN Zetta Killer, MD   montelukast 10 mg Oral HS Zetta Killer, MD   OLANZapine 5 mg Intramuscular Q8H PRN Zetta Killer, MD   OLANZapine 5 mg Oral Q8H PRN Zetta Killer, MD   ondansetron 4 mg Oral Q6H PRN Zetta Killer, MD   pantoprazole 40 mg Oral Early Morning Zetta Killer, MD   polyethylene glycol 17 g Oral Daily PRN Zetta Killer, MD   polyvinyl alcohol 1 drop Both Eyes Q3H PRN Zetta Killer, MD   sertraline 200 mg Oral Daily Zetta Killer, MD   sucralfate 1,000 mg Oral BID Zetta Killer, MD   theophylline 200 mg Oral Daily Zetta Killer, MD   tiotropium 18 mcg Inhalation Daily Dalton Garner MD   traZODone 25 mg Oral HS LAINEY Gonzalez MD       Counseling / Coordination of Care: Total floor / unit time spent today 15 minutes  Greater than 50% of total time was spent with the patient and / or family counseling and / or somewhat receptive to supportive listening and teaching positive coping skills to deal with symptom mangement  Patient's Rights, confidentiality and exceptions to confidentiality, use of automated medical record, Jeb Patrick staff access to medical record, and consent to treatment reviewed

## 2020-05-26 NOTE — PLAN OF CARE
Problem: Alteration in Thoughts and Perception  Goal: Verbalize thoughts and feelings  Description  Interventions:  - Promote a nonjudgmental and trusting relationship with the patient through active listening and therapeutic communication  - Assess patient's level of functioning, behavior and potential for risk  - Engage patient in 1 on 1 interactions for a minimum of 15 minutes each session  - Encourage patient to express fears, feelings, frustrations, and discuss symptoms    - [place] patient to reality, help patient recognize reality-based thinking   - Administer medications as ordered and assess for potential side effects  - Provide the patient education related to the signs and symptoms of the illness and desired effects of prescribed medications  Outcome: Progressing  Goal: Agree to be compliant with medication regime, as prescribed and report medication side effects  Description  Interventions:  - Offer appropriate PRN medication and supervise ingestion; conduct aims, as needed   Outcome: Progressing     Problem: Anxiety  Goal: Anxiety is at manageable level  Description  Interventions:  - Assess and monitor patient's anxiety level  - Monitor for signs and symptoms of anxiety both physical and emotional (heart palpitations, chest pain, shortness of breath, headaches, nausea, feeling jumpy, restlessness, irritable, apprehensive)  - Collaborate with interdisciplinary team and initiate plan and interventions as ordered    - [place] patient to unit/surroundings  - Explain treatment plan  - Encourage participation in care  - Encourage verbalization of concerns/fears  - Identify coping mechanisms  - Assist in developing anxiety-reducing skills  - Administer/offer alternative therapies  - Limit or eliminate stimulants  Outcome: Progressing     Problem: Alteration in Orientation  Goal: Interact with staff daily  Description  Interventions:  - Assess and re-assess patient's level of orientation  - Engage patient in 1 on 1 interactions, daily, for a minimum of 15 minutes   - Establish rapport/trust with patient   Outcome: Progressing     Problem: Alteration in Thoughts and Perception  Goal: Attend and participate in unit activities, including therapeutic, recreational, and educational groups  Description  Interventions:  - Provide therapeutic and educational activities daily, encourage attendance and participation, and document same in the medical record     CERTIFIED PEER SPECIALIST INTERVENTIONS:    Complete peer assessment with patient to assess their needs and identify their goals to complete while in the recovery program as well as once discharged into the community  Patient will complete WRAP Plan, Crisis Plan and 5 Life Domains  Patient will attend 50% of groups offered on the unit  Patient will complete a goal card weekly  Outcome: Not Progressing  Goal: Complete daily ADLs, including personal hygiene independently, as able  Description  Interventions:  - Observe, teach, and assist patient with ADLS  - Monitor and promote a balance of rest/activity, with adequate nutrition and elimination   Outcome: Not Yaquelin Sindi has been isolative to her room and self most of the shift  Napping at times  Interacts with select peers when out for medication and meal/snack  Pleasant and cooperative when approached by staff  Able to make needs known  Has not been somatic  Took pills without difficulty  Did not eat her meal, only drank Ensure  No shower  Had a BM  Did not attend evening group  Took HS medication  No snack  Continue to monitor  Precautions maintained

## 2020-05-26 NOTE — PROGRESS NOTES
05/26/20 1235   Team Meeting   Meeting Type Tx Team Meeting   Initial Conference Date 05/26/20   Next Conference Date 06/02/20   Team Members Present   Team Members Present Physician;;;Nurse; Other (Discipline and Name)   Physician Team Member Dr Virginia Chow Team Member Victor Manuel Ceron, RN   Care Management Team Member ASHLYN Tubbs   Other (Discipline and Name) [facility] [address]; [facility]   Patient/Family Present   Patient Present Yes   Patient's Family Present No     Patient attended treatment team meeting this morning prepared without self-assessment  Patient's   group attendance for last week was 4%  Team and patient completed risk assessment and the patient did not verbalize any desire to elope from the program  Patient verbalized understanding of consequences of eloping from treatment while on a commitment  Patient verbalized no further questions or concerns at the conclusion of the meeting  Next team meeting scheduled for 6/2/2020  Patient educated on her need to start participating in programming  Patient reports she will attempt to start going to [facility]  Patient reports she will be showering two times a week

## 2020-05-27 NOTE — PROGRESS NOTES
Maggie maintained on ongoing fall and SAFE precaution  Mercedez Link in bed with eyes closed, breath even and unlabored   On O2 with humidifier @1L/m via nasal cannula  Continues rounding implemented   No somatic complaint overnight  No PRN needed for sleep aid   No indication of pain or discomfort   Will continue to monitor

## 2020-05-27 NOTE — PLAN OF CARE
Problem: Alteration in Thoughts and Perception  Goal: Treatment Goal: Gain control of psychotic behaviors/thinking, reduce/eliminate presenting symptoms and demonstrate improved reality functioning upon discharge  Outcome: Progressing  Goal: Verbalize thoughts and feelings  Description  Interventions:  - Promote a nonjudgmental and trusting relationship with the patient through active listening and therapeutic communication  - Assess patient's level of functioning, behavior and potential for risk  - Engage patient in 1 on 1 interactions for a minimum of 15 minutes each session  - Encourage patient to express fears, feelings, frustrations, and discuss symptoms    - [place] patient to reality, help patient recognize reality-based thinking   - Administer medications as ordered and assess for potential side effects  - Provide the patient education related to the signs and symptoms of the illness and desired effects of prescribed medications  Outcome: Progressing  Goal: Attend and participate in unit activities, including therapeutic, recreational, and educational groups  Description  Interventions:  - Provide therapeutic and educational activities daily, encourage attendance and participation, and document same in the medical record     CERTIFIED PEER SPECIALIST INTERVENTIONS:    Complete peer assessment with patient to assess their needs and identify their goals to complete while in the recovery program as well as once discharged into the community  Patient will complete WRAP Plan, Crisis Plan and 5 Life Domains  Patient will attend 50% of groups offered on the unit  Patient will complete a goal card weekly      Outcome: Progressing     Problem: Ineffective Coping  Goal: Participates in unit activities  Description  Interventions:  - Provide therapeutic environment   - Provide required programming   - Redirect inappropriate behaviors   Outcome: Progressing     Problem: Risk for Self Injury/Neglect  Goal: Treatment Goal: Remain safe during length of stay, learn and adopt new coping skills, and be free of self-injurious ideation, impulses and acts at the time of discharge  Outcome: Progressing  Goal: Verbalize thoughts and feelings  Description  Interventions:  - Assess and re-assess patient's lethality and potential for self-injury  - Engage patient in 1:1 interactions, daily, for a minimum of 15 minutes  - Encourage patient to express feelings, fears, frustrations, hopes  - Establish rapport/trust with patient   Outcome: Progressing  Goal: Refrain from harming self  Description  Interventions:  - Monitor patient closely, per order  - Develop a trusting relationship  - Supervise medication ingestion, monitor effects and side effects   Outcome: Progressing  Goal: Attend and participate in unit activities, including therapeutic, recreational, and educational groups  Description  Interventions:  - Provide therapeutic and educational activities daily, encourage attendance and participation, and document same in the medical record  - Obtain collateral information, encourage visitation and family involvement in care   Outcome: Progressing     Problem: Depression  Goal: Treatment Goal: Demonstrate behavioral control of depressive symptoms, verbalize feelings of improved mood/affect, and adopt new coping skills prior to discharge  Outcome: Progressing  Goal: Verbalize thoughts and feelings  Description  Interventions:  - Assess and re-assess patient's level of risk   - Engage patient in 1:1 interactions, daily, for a minimum of 15 minutes   - Encourage patient to express feelings, fears, frustrations, hopes   Outcome: Progressing  Goal: Refrain from isolation  Description  Interventions:  - Develop a trusting relationship   - Encourage socialization   Outcome: Progressing     Problem: Anxiety  Goal: Anxiety is at manageable level  Description  Interventions:  - Assess and monitor patient's anxiety level     - Monitor for signs and symptoms of anxiety both physical and emotional (heart palpitations, chest pain, shortness of breath, headaches, nausea, feeling jumpy, restlessness, irritable, apprehensive)  - Collaborate with interdisciplinary team and initiate plan and interventions as ordered  - [place] patient to unit/surroundings  - Explain treatment plan  - Encourage participation in care  - Encourage verbalization of concerns/fears  - Identify coping mechanisms  - Assist in developing anxiety-reducing skills  - Administer/offer alternative therapies  - Limit or eliminate stimulants  Outcome: Progressing     Problem: Alteration in Orientation  Goal: Interact with staff daily  Description  Interventions:  - Assess and re-assess patient's level of orientation  - Engage patient in 1 on 1 interactions, daily, for a minimum of 15 minutes   - Establish rapport/trust with patient   Outcome: Progressing     Problem: PAIN - ADULT  Goal: Verbalizes/displays adequate comfort level or baseline comfort level  Description  Interventions:  - Encourage patient to monitor pain and request assistance  - Assess pain using appropriate pain scale  - Administer analgesics based on type and severity of pain and evaluate response  - Implement non-pharmacological measures as appropriate and evaluate response  - Consider cultural and social influences on pain and pain management  - Notify physician/advanced practitioner if interventions unsuccessful or patient reports new pain  Outcome: Progressing     Problem: SAFETY ADULT  Goal: Patient will remain free of falls  Description  INTERVENTIONS:  - Assess patient frequently for physical needs  -  Identify cognitive and physical deficits and behaviors that affect risk of falls    -  [place] fall precautions as indicated by assessment   - Educate patient/family on patient safety including physical limitations  - Instruct patient to call for assistance with activity based on assessment  - Modify environment to reduce risk of injury  - Consider OT/PT consult to assist with strengthening/mobility  Outcome: Progressing     Problem: Nutrition/Hydration-ADULT  Goal: Nutrient/Hydration intake appropriate for improving, restoring or maintaining nutritional needs  Description  Monitor and assess patient's nutrition/hydration status for malnutrition  Collaborate with interdisciplinary team and initiate plan and interventions as ordered  Monitor patient's weight and dietary intake as ordered or per policy  Utilize nutrition screening tool and intervene as necessary  Determine patient's food preferences and provide high-protein, high-caloric foods as appropriate       INTERVENTIONS:  - Monitor oral intake, urinary output, labs, and treatment plans  - Assess nutrition and hydration status and recommend course of action  - Evaluate amount of meals eaten  - Assist patient with eating if necessary   - Allow adequate time for meals  - Recommend/ encourage appropriate diets, oral nutritional supplements, and vitamin/mineral supplements  - Order, calculate, and assess calorie counts as needed  - Recommend, monitor, and adjust tube feedings and TPN/PPN based on assessed needs  - Assess need for intravenous fluids  - Provide specific nutrition/hydration education as appropriate  - Include patient/family/caregiver in decisions related to nutrition  Outcome: Progressing     Problem: Alteration in Thoughts and Perception  Goal: Agree to be compliant with medication regime, as prescribed and report medication side effects  Description  Interventions:  - Offer appropriate PRN medication and supervise ingestion; conduct aims, as needed   Outcome: Not Progressing  Goal: Recognize dysfunctional thoughts, communicate reality-based thoughts at the time of discharge  Description  Interventions:  - Provide medication and psycho-education to assist patient in compliance and developing insight into his/her illness   Outcome: Not Progressing  Goal: Complete daily ADLs, including personal hygiene independently, as able  Description  Interventions:  - Observe, teach, and assist patient with ADLS  - Monitor and promote a balance of rest/activity, with adequate nutrition and elimination   Outcome: Not Progressing     Problem: Risk for Self Injury/Neglect  Goal: Complete daily ADLs, including personal hygiene independently, as able  Description  Interventions:  - Observe, teach, and assist patient with ADLS  - Monitor and promote a balance of rest/activity, with adequate nutrition and elimination  Outcome: Not Progressing     Problem: Depression  Goal: Refrain from self-neglect  Outcome: Not Progressing  Slightly more visible today, out for meds and meals as usual but did attend IMR group  Ate 25% of breakfast and 100% of lunch  Continues to refuse scheduled Abilify  Took other scheduled meds  No other behaviors or issues noted  Continue to monitor

## 2020-05-27 NOTE — TREATMENT TEAM
Patient declined      05/27/20 1001   Activity/Group Checklist   Group Community meeting  (Coffee/Journaling )   Attendance Did not attend   Attendance Duration (min) 31-45   Affect/Mood IRMA

## 2020-05-27 NOTE — PLAN OF CARE
Problem: Alteration in Thoughts and Perception  Goal: Agree to be compliant with medication regime, as prescribed and report medication side effects  Description  Interventions:  - Offer appropriate PRN medication and supervise ingestion; conduct aims, as needed   Outcome: Progressing     Problem: Depression  Goal: Refrain from self-neglect  Outcome: Progressing     Problem: Alteration in Thoughts and Perception  Goal: Complete daily ADLs, including personal hygiene independently, as able  Description  Interventions:  - Observe, teach, and assist patient with ADLS  - Monitor and promote a balance of rest/activity, with adequate nutrition and elimination   Outcome: Not Progressing     Problem: Risk for Self Injury/Neglect  Goal: Recognize maladaptive responses and adopt new coping mechanisms  Outcome: Not Progressing     Problem: Depression  Goal: Refrain from isolation  Description  Interventions:  - Develop a trusting relationship   - Encourage socialization   Outcome: Not Herbie Wong continues to embrace the invalid role, manipulating others into doing tasks for her that are within her capabilities  Asked roommate to take her clothes to washer, asked male peer to put the clothes in the dryer for her  Was called on these behaviors  "I need help " Reinforced w/her that needs to start doing for herself where capable  Was a bit ambitious in plan to cut her long curled thick fingernails, but, after one nail, said, "I'm shaking  I'm afraid I'll cut myself " Was given staff assistance to finish task safely  Did come out for supper medicine & meal, but, ate nothing; had only Ensure  Has otherwise carson in bed

## 2020-05-27 NOTE — PROGRESS NOTES
Psychiatry Progress Note [address] 60 y o  female MRN: [id number]  Unit/Bed#: [facility] 111-01 Encounter: [id number]  Code Status: Level 1 - Full Code    PCP: Jordan Chavez PA-C    Date of Admission:  7/23/2019 3947   Date of Service:  05/27/20  Patient Active Problem List   Diagnosis    COPD with asthma ([facility] )    Tobacco use disorder, continuous    Compression fracture of L4 lumbar vertebra    Ventral hernia    Acute on chronic [facility] failure with hypoxia [facility]    Schizoaffective disorder, bipolar type [facility]    Acquired hypothyroidism    Gastroesophageal reflux disease without esophagitis    Abnormal CT of the chest    Excessive cerumen in left ear canal    Lipoma of right upper extremity    Noncompliant with deep vein thrombosis (DVT) prophylaxis    At risk for aspiration    Ear ache    Tachycardia    Chest pain    Low HDL (under 40)     Diagnosis schizoaffective  Assessment    Overall Status: still psychosomatic, not attending groups, stays on her bed, not eating al lmeals but without significant weight loss   Certification Statement to demonstrate a period of stability by attending to ADLs and becoming less psychosomatic in attending more groups Acceptance by patient:  Accepting  Darline Real in recovery:  Living at another personal care home   Understanding of medications:  Patient is aware about risks side effects benefits and precautions of medications   Involved in reintegration process: On hold due to [address] in relationship with psychiatrist:  Trusting sometimes    Medication changes   Discontinue Abilify due to lack of compliance  Non-pharmacological treatments   Waiting for your culture and sensitivity report   Continue with individual, group, milieu and occupational therapy using recovery principles and psycho-education about accepting illness and the need for treatment     Encourage to take showers twice a week and cooperate with treatment and adl skills as per her behav  Plan   Therapeutic passes on hold due to covid 19 lock down   Prepare for the [place] [facility] and encouraged to accept  rather than refuse it   Reminded to attend at least 25% of groups on a daily basis including IMR   Encouraged to start taking the Abilify and the increased Zoloft    Safety   Safety and communication plan established to target dynamic risk factors discussed above  Discharge Plan   · back to a [facility] at [facility] [address] passive aggressive, paranoid, preoccupied, refusing groups or take showers often, picking on her food but with no significant weight loss  Still psychosomatic still claiming she is dying from cancer or shortness of birth or from heart attack etc and still believes staff is tampering with her meds and oxygen concentrator and spirometer  She is again refusing abilify which will hence be discontinued  She has been taking the increase in Zoloft and compliant with rest of meds so far  Still passive-aggressive about attending groups and will continue to motivate herself to attend to ADLs and increase group attendance to go to the [address] [place] [address] where she was already accepted  She told me she is going to take a shower later today   Sleep:   Fair  Appetite:   Fair but she picks and chooses her food  Compliance with medications:  Good  Side effects:   none but claims to feel tired sometimes  Review of systems:  Continues to have psychosomatic symptoms     Mental Status Exam  Appearance:    Patient found laying on bed but did get up when approached, complaining of feeling tired and having no one to help her with a shower, poorly groomed Looks older than her stated age,and not very well kept, with uncombed hairt but  wearing clean clothing   Anxious preoccupied  Has good eye contact   Suspicious in her interaction      Behavior:    Superficially friendly pleasant but anxious suspicious and preoccupied  Speech: tangential at times with tendency to become stilted   Mood:     anxious irritated times,    Affect:    increased in intensity range mood congruent redirectable  Thought Process:   Circumstantial with tendency to have stilted speech   Thought Content:   Still paranoid about motives of staff switching her medications or tampering with her inhalant or her oxygen concentrator off and on  She was even questioning why she was getting an EKG  She also has to examine her pills literally before taking them  No preoccupation with violence or suicide  No current suicidal homicidal thoughts intent or plans reported  No phobias but has obsessions and compulsions about examining her pills before she takes them  Nabeel Stuart Psychosomatic about dying by choking from food and from heart attack or from shortness of breath and now from catching Covid-19 which are all ongoing beliefs that she has  Perceptual Disturbances:  No longer claims to hear voices   Risk Potential:   Ability to care for herself and refusal to take showers  Sensorium:   fully oriented to place, person, time, date, day, month, year and to situation  Cognition:    Grossly intact, aware of current events like the corona virus situation, recent and remote memory intact     No language deficit  Consciousness:   Alert and awake  Attention and concentration:  fair  Intellect:    average  Insight:    limited  Judgment:    fair  Motor Activity:  No abnormal involuntary movement noted today    Vitals  Temp:  [97 3 °F (36 3 °C)-97 9 °F (36 6 °C)] 97 9 °F (36 6 °C)  HR:  [68-73] 68  Resp:  [16-18] 18  BP: (100-110)/(59-60) 110/60  SpO2:  [93 %] 93 %  No intake or output data in the 24 hours ending 05/27/20 0959    Lab Results: No New Labs Available For Today  Results from last 7 days   Lab Units 05/22/20  0730   WBC Thousand/uL 8 70   RBC Million/uL 4 70   HEMOGLOBIN g/dL 15 0   HEMATOCRIT % 44 4   MCV fL 94   PLATELETS Thousands/uL 188   NEUTROS ABS Thousands/µL 5 20 Current Facility-Administered Medications:  acetaminophen 325 mg Oral Q6H PRN Zander Yanez MD   acetaminophen 650 mg Oral Q6H PRN Zander Yanez MD   acetaminophen 650 mg Oral Q8H PRN Zander Yanez MD   albuterol 2 puff Inhalation Q4H PRN Zander Yanez MD   aluminum-magnesium hydroxide-simethicone 15 mL Oral Q4H PRN Zander Yanez MD   ammonium lactate 1 application Topical BID PRN Zander Yanez MD   ARIPiprazole 2 mg Oral Daily Zander Yanez MD   benzonatate 100 mg Oral TID PRN Zander Yanez MD   benztropine 1 mg Intramuscular Q8H PRN Zander Yanez MD   carbamide peroxide 5 drop Left Ear BID PRN Zander Yanez MD   cloZAPine 25 mg Oral BID Zander Yanez MD   cloZAPine 50 mg Oral BID Zander Yanez MD   docusate sodium 100 mg Oral BID PRN Zander Yanez MD   EPINEPHrine PF 0 15 mg Intramuscular Once PRN Zander Yanez MD   fluticasone-vilanterol 1 puff Inhalation Daily Zander Yanez MD   ketotifen 1 drop Right Eye BID PRN Zander Yanez MD   levothyroxine 125 mcg Oral Early Morning Zander Yanez MD   magnesium hydroxide 30 mL Oral Daily PRN Zander Yanez MD   montelukast 10 mg Oral HS Zander Yanez MD   OLANZapine 5 mg Intramuscular Q8H PRN Zander Yanez MD   OLANZapine 5 mg Oral Q8H PRN Zander Yanez MD   ondansetron 4 mg Oral Q6H PRN Zander Yanez MD   pantoprazole 40 mg Oral Early Morning Zander Yanez MD   polyethylene glycol 17 g Oral Daily PRN Zander Yanez MD   polyvinyl alcohol 1 drop Both Eyes Q3H PRN Zander Yanez MD   sertraline 200 mg Oral Daily Zander Yanez MD   sucralfate 1,000 mg Oral BID Tomás Layne MD   theophylline 200 mg Oral Daily Zander Yanez MD   tiotropium 18 mcg Inhalation Daily Zander Yanez MD   traZODone 25 mg Oral HS PRN Zander Yanez MD       Counseling / Coordination of Care: Total floor / unit time spent today 15 minutes   Greater than 50% of total time was spent with the patient and / or family counseling and / or somewhat receptive to supportive listening and teaching positive coping skills to deal with symptom mangement  Patient's Rights, confidentiality and exceptions to confidentiality, use of automated medical record, [facility] staff access to medical record, and consent to treatment reviewed

## 2020-05-28 NOTE — PLAN OF CARE
Problem: Alteration in Thoughts and Perception  Goal: Agree to be compliant with medication regime, as prescribed and report medication side effects  Description  Interventions:  - Offer appropriate PRN medication and supervise ingestion; conduct aims, as needed   Outcome: Progressing  Goal: Attend and participate in unit activities, including therapeutic, recreational, and educational groups  Description  Interventions:  - Provide therapeutic and educational activities daily, encourage attendance and participation, and document same in the medical record     CERTIFIED PEER SPECIALIST INTERVENTIONS:    Complete peer assessment with patient to assess their needs and identify their goals to complete while in the recovery program as well as once discharged into the community  Patient will complete WRAP Plan, Crisis Plan and [facility]  Patient will attend 50% of groups offered on the unit  Patient will complete a goal card weekly  Outcome: Not Progressing  Goal: Complete daily ADLs, including personal hygiene independently, as able  Description  Interventions:  - Observe, teach, and assist patient with ADLS  - Monitor and promote a balance of rest/activity, with adequate nutrition and elimination   Outcome: Jannette Barrera Kimble has been isolative to her room in between scheduled medications and meals which she was compliant for  Attended no groups  Disheveled in appearance, body odor present  Lacks motivation and insight with her recovery  Refused morning abilify  Remains in her room, continues to voice complaints of fatigue  Will continue to monitor for changes

## 2020-05-28 NOTE — PROGRESS NOTES
Psychiatry Progress Note [address] 60 y o  female MRN: [id number]  Unit/Bed#: [facility] 111-01 Encounter: [id number]  Code Status: Level 1 - Full Code    PCP: Deeanna Apley, PA-C    Date of Admission:  7/23/2019 1730   Date of Service:  05/28/20  Patient Active Problem List   Diagnosis    [facility]    Tobacco use disorder, continuous    Compression fracture of L4 lumbar vertebra    Ventral hernia    Acute on chronic respiratory failure with hypoxia ([facility]    Schizoaffective disorder, bipolar type [facility]    Acquired hypothyroidism    Gastroesophageal reflux disease without esophagitis    Abnormal CT of the chest    Excessive cerumen in left ear canal    Lipoma of right upper extremity    Noncompliant with deep vein thrombosis (DVT) prophylaxis    At risk for aspiration    Ear ache    Tachycardia    Chest pain    Low HDL (under 40)     Diagnosis schizoaffective bipolar  Assessment    Overall Status:  Still needing lot of reminders and prompts to take showers twice a week but is attending more groups now and continues to have psychosomatic symptoms   Certification Statement to demonstrate a period of stability by attending to ADLs and becoming less psychosomatic in attending more groups Acceptance by patient:  Accepting  Alberteen December in recovery:  Living at another personal care home   Understanding of medications:  Patient is aware about risks side effects benefits and precautions of medications   Involved in reintegration process:   On hold due to [address] in relationship with psychiatrist:  Trusting sometimes    Medication changes   Discontinue Abilify due to lack of compliance  Non-pharmacological treatments   Waiting for your culture and sensitivity report   Continue with individual, group, milieu and occupational therapy using recovery principles and psycho-education about accepting illness and the need for treatment   Encourage to take showers twice a week and cooperate with treatment and adl skills as per her behav  Plan   Therapeutic passes on hold due to covid 19 lock down   Prepare for the [facility] and encouraged to accept  rather than refuse it   Reminded to attend at least 25% of groups on a daily basis including IMR   Encouraged to start taking the Abilify and the increased Zoloft    Safety   Safety and communication plan established to target dynamic risk factors discussed above  Discharge Plan   · back to a [facility] at [address] [facility] [address]   Patient was eager to tell me that she did attend 3 groups yesterday which is a big improvement for her  She also had her clothes washed yesterday and promises to take a shower today for sure  She is still somewhat psychosomatic with the same usual complaints but did not voice them unless asked today  She is still somewhat suspicious and she was glad that I took her off the Abilify which he never took  She is still somewhat poorly groomed disheveled unkept and was found laying on bed but woke up and readily interacted with me and was trying to please me with her announcement that she had attended 3 groups yesterday  She was told that she needs to continue to work on her ADLs and group attendance to be able to go to the [facility] personal care home were she already was accepted and has a bed waiting for her  Sleep:   Fair  Appetite:   Fair but she picks and chooses her food  Compliance with medications:  Good  Side effects:   none but claims to feel tired sometimes  Review of systems:  Continues to have psychosomatic symptoms     Mental Status Exam  Appearance:    Patient was again found laying on bed but did get up when approached, , poorly groomed Looks older than her stated age,and not very well kept, with uncombed hairt but  wearing clean clothing   Anxious preoccupied  Has good eye contact   Suspicious in her interaction      Behavior: Superficially friendly pleasant but anxious suspicious and preoccupied  Speech:    tangential at times with tendency to become stilted   Mood:     anxious irritated times,    Affect:    increased in intensity range mood congruent redirectable  Thought Process:   Circumstantial with tendency to have stilted speech   Thought Content:   Continues to remain paranoid about motives of staff switching her medications or tampering with her inhalant or her oxygen concentrator off and on  No preoccupation with violence or suicide  No current suicidal homicidal thoughts intent or plans reported  No phobias but has obsessions and compulsions about examining her pills before she takes them  Nathen Manifold Psychosomatic about dying by choking from food and from heart attack or from shortness of breath and now from catching Covid-19 which are all ongoing beliefs  Perceptual Disturbances:  No longer claims to hear voices   Risk Potential:   Ability to care for herself and refusal to take showers  Sensorium:   fully oriented to place, person, time, date, day, month, year and to situation  Cognition:    Grossly intact, aware of current events like the corona virus situation, recent and remote memory intact     No language deficit  Consciousness:   Alert and awake  Attention and concentration:  fair  Intellect:    average  Insight:    limited  Judgment:    fair  Motor Activity:  No abnormal involuntary movement noted today    Vitals  Temp:  [97 1 °F (36 2 °C)-97 7 °F (36 5 °C)] 97 7 °F (36 5 °C)  HR:  [86-90] 86  Resp:  [16-18] 18  BP: ()/(57-64) 123/64  SpO2:  [92 %] 92 %  No intake or output data in the 24 hours ending 05/28/20 1045    Lab Results: No New Labs Available For Today  Results from last 7 days   Lab Units 05/22/20  0730   WBC Thousand/uL 8 70   RBC Million/uL 4 70   HEMOGLOBIN g/dL 15 0   HEMATOCRIT % 44 4   MCV fL 94   PLATELETS Thousands/uL 188   NEUTROS ABS Thousands/µL 5 20         Current Facility-Administered Medications:  acetaminophen 325 mg Oral Q6H PRN Jennifer Taveras MD   acetaminophen 650 mg Oral Q6H PRN Jennifer Taveras MD   acetaminophen 650 mg Oral Q8H PRN Jennifer Taveras MD   albuterol 2 puff Inhalation Q4H PRN Jennifer Taveras MD   aluminum-magnesium hydroxide-simethicone 15 mL Oral Q4H PRN Jennifer Taveras MD   ammonium lactate 1 application Topical BID PRN Jennifer Taveras MD   ARIPiprazole 2 mg Oral Daily Jennifer Taveras MD   benzonatate 100 mg Oral TID PRN Jennifer Taveras MD   benztropine 1 mg Intramuscular Q8H PRN Jennifer Taveras MD   carbamide peroxide 5 drop Left Ear BID PRN Jennifer Taveras MD   cloZAPine 25 mg Oral BID Jennifer Taveras MD   cloZAPine 50 mg Oral BID Jennifer Taveras MD   docusate sodium 100 mg Oral BID PRN Jennifer Taveras MD   EPINEPHrine PF 0 15 mg Intramuscular Once PRN Jennifer Taveras MD   fluticasone-vilanterol 1 puff Inhalation Daily Jennifer Taveras MD   ketotifen 1 drop Right Eye BID PRN Jennifer Taveras MD   levothyroxine 125 mcg Oral Early Morning Jennifer Taveras MD   magnesium hydroxide 30 mL Oral Daily PRN Jennifer Taveras MD   montelukast 10 mg Oral HS Jennifer Taveras MD   OLANZapine 5 mg Intramuscular Q8H PRN Jennifer Taveras MD   OLANZapine 5 mg Oral Q8H PRN Jennifer Taveras MD   ondansetron 4 mg Oral Q6H PRN Jennifer Taveras MD   pantoprazole 40 mg Oral Early Morning Jennifer Taveras MD   polyethylene glycol 17 g Oral Daily PRN Jennifer Taveras MD   polyvinyl alcohol 1 drop Both Eyes Q3H PRN Jennifer Taveras MD   sertraline 200 mg Oral Daily Jennifer Taveras MD   sucralfate 1,000 mg Oral BID Fletcher Nevarez MD   theophylline 200 mg Oral Daily Jennifer Taveras MD   tiotropium 18 mcg Inhalation Daily Jennifer Taveras MD   traZODone 25 mg Oral HS PRN Jennifer Taveras MD       Counseling / Coordination of Care: Total floor / unit time spent today 15 minutes  Greater than 50% of total time was spent with the patient and / or family counseling and / or somewhat receptive to supportive listening and teaching positive coping skills to deal with symptom mangement       Patient's Rights, confidentiality and exceptions to confidentiality, use of automated medical record, [address] staff access to medical record, and consent to treatment reviewed

## 2020-05-28 NOTE — TREATMENT TEAM
Patient declined      05/28/20 1100   Activity/Group Checklist   Group   (IMR/Pictionary )   Attendance Did not attend   Attendance Duration (min) 46-60   Affect/Mood IRMA

## 2020-05-28 NOTE — PROGRESS NOTES
Kathleen Marie did participate in PM Group  She did use the Incentive Spirometer, but, performed poorly for her; consistent 1000-1100ml volumes  "Maybe because I was talking so much on the phone " Had called family  Did take her HS medicine, did not eat all HS snack as upset by agitated loud peer  Wearing now her QHS humidified nasal O2 @ 1L for bed

## 2020-05-28 NOTE — PLAN OF CARE
Problem: Alteration in Thoughts and Perception  Goal: Verbalize thoughts and feelings  Description  Interventions:  - Promote a nonjudgmental and trusting relationship with the patient through active listening and therapeutic communication  - Assess patient's level of functioning, behavior and potential for risk  - Engage patient in 1 on 1 interactions for a minimum of 15 minutes each session  - Encourage patient to express fears, feelings, frustrations, and discuss symptoms    - [place] patient to reality, help patient recognize reality-based thinking   - Administer medications as ordered and assess for potential side effects  - Provide the patient education related to the signs and symptoms of the illness and desired effects of prescribed medications  5/28/2020 1928 by Elli Javier RN  Outcome: Progressing  5/28/2020 1927 by Elli Javier RN  Outcome: Progressing  Goal: Agree to be compliant with medication regime, as prescribed and report medication side effects  Description  Interventions:  - Offer appropriate PRN medication and supervise ingestion; conduct aims, as needed   Outcome: Progressing  Goal: Attend and participate in unit activities, including therapeutic, recreational, and educational groups  Description  Interventions:  - Provide therapeutic and educational activities daily, encourage attendance and participation, and document same in the medical record     CERTIFIED PEER SPECIALIST INTERVENTIONS:    Complete peer assessment with patient to assess their needs and identify their goals to complete while in the recovery program as well as once discharged into the community  Patient will complete WRAP Plan, Crisis Plan and 5 Life Domains  Patient will attend 50% of groups offered on the unit  Patient will complete a goal card weekly      5/28/2020 1928 by Elli Javier RN  Outcome: Progressing  5/28/2020 1927 by Elli Javier RN  Outcome: Progressing  Goal: Complete daily ADLs, including personal hygiene independently, as able  Description  Interventions:  - Observe, teach, and assist patient with ADLS  - Monitor and promote a balance of rest/activity, with adequate nutrition and elimination   5/28/2020 1928 by Pierre Salas RN  Outcome: Progressing  5/28/2020 1927 by Pierre Salas RN  Outcome: Progressing     Problem: Depression  Goal: Refrain from isolation  Description  Interventions:  - Develop a trusting relationship   - Encourage socialization   5/28/2020 1928 by Pierre Salas RN  Outcome: Progressing  5/28/2020 1927 by Pierre Salas RN  Outcome: Progressing     Problem: Depression  Goal: Refrain from self-neglect  5/28/2020 1928 by Pierre Salas RN  Outcome: Not Progressing  5/28/2020 1927 by Pierre Salas RN  Outcome: Not Progressing     Problem: Nutrition/Hydration-ADULT  Goal: Nutrient/Hydration intake appropriate for improving, restoring or maintaining nutritional needs  Description  Monitor and assess patient's nutrition/hydration status for malnutrition  Collaborate with interdisciplinary team and initiate plan and interventions as ordered  Monitor patient's weight and dietary intake as ordered or per policy  Utilize nutrition screening tool and intervene as necessary  Determine patient's food preferences and provide high-protein, high-caloric foods as appropriate       INTERVENTIONS:  - Monitor oral intake, urinary output, labs, and treatment plans  - Assess nutrition and hydration status and recommend course of action  - Evaluate amount of meals eaten  - Assist patient with eating if necessary   - Allow adequate time for meals  - Recommend/ encourage appropriate diets, oral nutritional supplements, and vitamin/mineral supplements  - Order, calculate, and assess calorie counts as needed  - Recommend, monitor, and adjust tube feedings and TPN/PPN based on assessed needs  - Assess need for intravenous fluids  - Provide specific nutrition/hydration education as appropriate  - Include patient/family/caregiver in decisions related to nutrition  Outcome: Jannette Del Real continues to embrace the invalid role, somatic in focus, her 'symptoms' preventing her from doing tasks for herself such as getting her hygiene bin/shower supplies (says her blocked L inner ear could cause her to be dizzy/fall), begging for PO2 &/or VS to be done before she showers (was told will have VS per routine @ 2000), refusing to shampoo as says raising arms above head saps energy needed to wash body (no shampoo done in over 4weeks as refused it @ last shower too), asking if she is 'blue' in color during shower (wasn't)  Ate only 10% of meal (bites mashed potato) as "too tired (from shower), might choke", did have Ensure  Came up for supper medicine  Did take part in [facility] Group  Was out briefly in [place] chair chatting sociably w/male peer

## 2020-05-28 NOTE — PROGRESS NOTES
Maggie maintained on ongoing fall and SAFE precaution  Bill Milks in bed with eyes closed, breath even and unlabored   On O2 with humidifier @1L/m via nasal cannula  Continues rounding implemented   No somatic complaint overnight  No PRN needed for sleep aid   No indication of pain or discomfort   Will continue to monitor

## 2020-05-28 NOTE — PROGRESS NOTES
05/28/20 0913   Team Meeting   Meeting Type Daily Rounds   Team Members Present   Team Members Present Physician;;Nurse;; Other (Discipline and Name)   Physician Team Member Dr Rosa Jimenez Team Member Jewels Snyder, RN   Care Management Team Member Serge Leyden, MSW   Social Work Team Member JER BarkleyW   Other (Discipline and Name) Meryle Laws, CPS     Somatic, went to [facility]

## 2020-05-29 NOTE — PROGRESS NOTES
05/29/20 0900   Activity/Group Checklist   Group Community meeting  (Morning Walk)   Attendance Did not attend     Patient was encouraged to come to group, but declined  Patient was in cafe when prompted

## 2020-05-29 NOTE — PROGRESS NOTES
Maggie maintained on ongoing fall and SAFE precaution  Catalina Perez in bed with eyes closed, breath even and unlabored   On O2 with humidifier @1L/m via nasal cannula  Continues rounding implemented   No somatic complaint overnight  No PRN needed for sleep aid   No indication of pain or discomfort   Will continue to monitor Universal Safety Interventions

## 2020-05-29 NOTE — PLAN OF CARE
Problem: Alteration in Thoughts and Perception  Goal: Agree to be compliant with medication regime, as prescribed and report medication side effects  Description  Interventions:  - Offer appropriate PRN medication and supervise ingestion; conduct aims, as needed   Outcome: Progressing     Problem: Alteration in Thoughts and Perception  Goal: Attend and participate in unit activities, including therapeutic, recreational, and educational groups  Description  Interventions:  - Provide therapeutic and educational activities daily, encourage attendance and participation, and document same in the medical record     CERTIFIED PEER SPECIALIST INTERVENTIONS:    Complete peer assessment with patient to assess their needs and identify their goals to complete while in the recovery program as well as once discharged into the community  Patient will complete WRAP Plan, Crisis Plan and 5 Life Domains  Patient will attend 50% of groups offered on the unit  Patient will complete a goal card weekly  Outcome: Not Progressing  Goal: Complete daily ADLs, including personal hygiene independently, as able  Description  Interventions:  - Observe, teach, and assist patient with ADLS  - Monitor and promote a balance of rest/activity, with adequate nutrition and elimination   Outcome: Not Progressing     Problem: Depression  Goal: Refrain from isolation  Description  Interventions:  - Develop a trusting relationship   - Encourage socialization   Outcome: Not Progressing  Goal: Refrain from self-neglect  Outcome: Not Progressing     [address] has been isolative to room & bed sleeping  With prompts, awoke for 1600 medicine  At that time was directed to @ least go get her comb from hygiene bin, comb out her hair which is again matting as is wearing it loose  She agreed reluctantly, but, as last evening, no follow through w/grooming hair  Came out for supper medicine, refused meal, drinking just the Ensure   Has not responded to invitations to offered evening programming

## 2020-05-29 NOTE — PROGRESS NOTES
05/29/20 0933   Team Meeting   Meeting Type Daily Rounds   Team Members Present   Team Members Present Physician;;Nurse;   Physician Team Member Dr Dawna Piña, RN   Care Management Team Member Sierra Haro MSW   Social Work Team [facility] Aug[facility] Cam[facility]     Showered but refused to wash hair  Refusing Abilify

## 2020-05-29 NOTE — PLAN OF CARE
Problem: Alteration in Thoughts and Perception  Goal: Verbalize thoughts and feelings  Description  Interventions:  - Promote a nonjudgmental and trusting relationship with the patient through active listening and therapeutic communication  - Assess patient's level of functioning, behavior and potential for risk  - Engage patient in 1 on 1 interactions for a minimum of 15 minutes each session  - Encourage patient to express fears, feelings, frustrations, and discuss symptoms    - [place] patient to reality, help patient recognize reality-based thinking   - Administer medications as ordered and assess for potential side effects  - Provide the patient education related to the signs and symptoms of the illness and desired effects of prescribed medications  Outcome: Progressing  Goal: Agree to be compliant with medication regime, as prescribed and report medication side effects  Description  Interventions:  - Offer appropriate PRN medication and supervise ingestion; conduct aims, as needed   Outcome: Progressing     Problem: Ineffective Coping  Goal: Patient/Family verbalizes awareness of resources  Outcome: Progressing  Goal: Understands least restrictive measures  Description  Interventions:  - Utilize least restrictive behavior  Outcome: Progressing     Problem: Risk for Self Injury/Neglect  Goal: Treatment Goal: Remain safe during length of stay, learn and adopt new coping skills, and be free of self-injurious ideation, impulses and acts at the time of discharge  Outcome: Progressing  Goal: Verbalize thoughts and feelings  Description  Interventions:  - Assess and re-assess patient's lethality and potential for self-injury  - Engage patient in 1:1 interactions, daily, for a minimum of 15 minutes  - Encourage patient to express feelings, fears, frustrations, hopes  - Establish rapport/trust with patient   Outcome: Progressing  Goal: Refrain from harming self  Description  Interventions:  - Monitor patient closely, per order  - Develop a trusting relationship  - Supervise medication ingestion, monitor effects and side effects   Outcome: Progressing     Problem: Depression  Goal: Verbalize thoughts and feelings  Description  Interventions:  - Assess and re-assess patient's level of risk   - Engage patient in 1:1 interactions, daily, for a minimum of 15 minutes   - Encourage patient to express feelings, fears, frustrations, hopes   Outcome: Progressing     Problem: Anxiety  Goal: Anxiety is at manageable level  Description  Interventions:  - Assess and monitor patient's anxiety level  - Monitor for signs and symptoms of anxiety both physical and emotional (heart palpitations, chest pain, shortness of breath, headaches, nausea, feeling jumpy, restlessness, irritable, apprehensive)  - Collaborate with interdisciplinary team and initiate plan and interventions as ordered    - [place] patient to unit/surroundings  - Explain treatment plan  - Encourage participation in care  - Encourage verbalization of concerns/fears  - Identify coping mechanisms  - Assist in developing anxiety-reducing skills  - Administer/offer alternative therapies  - Limit or eliminate stimulants  Outcome: Progressing     Problem: Alteration in Orientation  Goal: Interact with staff daily  Description  Interventions:  - Assess and re-assess patient's level of orientation  - Engage patient in 1 on 1 interactions, daily, for a minimum of 15 minutes   - Establish rapport/trust with patient   Outcome: Progressing     Problem: PAIN - ADULT  Goal: Verbalizes/displays adequate comfort level or baseline comfort level  Description  Interventions:  - Encourage patient to monitor pain and request assistance  - Assess pain using appropriate pain scale  - Administer analgesics based on type and severity of pain and evaluate response  - Implement non-pharmacological measures as appropriate and evaluate response  - Consider cultural and social influences on pain and pain management  - Notify physician/advanced practitioner if interventions unsuccessful or patient reports new pain  Outcome: Progressing     Problem: SAFETY ADULT  Goal: Patient will remain free of falls  Description  INTERVENTIONS:  - Assess patient frequently for physical needs  -  Identify cognitive and physical deficits and behaviors that affect risk of falls  -  [place] fall precautions as indicated by assessment   - Educate patient/family on patient safety including physical limitations  - Instruct patient to call for assistance with activity based on assessment  - Modify environment to reduce risk of injury  - Consider OT/PT consult to assist with strengthening/mobility  Outcome: Progressing     Problem: Nutrition/Hydration-ADULT  Goal: Nutrient/Hydration intake appropriate for improving, restoring or maintaining nutritional needs  Description  Monitor and assess patient's nutrition/hydration status for malnutrition  Collaborate with interdisciplinary team and initiate plan and interventions as ordered  Monitor patient's weight and dietary intake as ordered or per policy  Utilize nutrition screening tool and intervene as necessary  Determine patient's food preferences and provide high-protein, high-caloric foods as appropriate       INTERVENTIONS:  - Monitor oral intake, urinary output, labs, and treatment plans  - Assess nutrition and hydration status and recommend course of action  - Evaluate amount of meals eaten  - Assist patient with eating if necessary   - Allow adequate time for meals  - Recommend/ encourage appropriate diets, oral nutritional supplements, and vitamin/mineral supplements  - Order, calculate, and assess calorie counts as needed  - Recommend, monitor, and adjust tube feedings and TPN/PPN based on assessed needs  - Assess need for intravenous fluids  - Provide specific nutrition/hydration education as appropriate  - Include patient/family/caregiver in decisions related to nutrition  Outcome: Progressing     Problem: Alteration in Thoughts and Perception  Goal: Treatment Goal: Gain control of psychotic behaviors/thinking, reduce/eliminate presenting symptoms and demonstrate improved reality functioning upon discharge  Outcome: Not Progressing  Goal: Attend and participate in unit activities, including therapeutic, recreational, and educational groups  Description  Interventions:  - Provide therapeutic and educational activities daily, encourage attendance and participation, and document same in the medical record     CERTIFIED PEER SPECIALIST INTERVENTIONS:    Complete peer assessment with patient to assess their needs and identify their goals to complete while in the recovery program as well as once discharged into the community  Patient will complete WRAP Plan, Crisis Plan and 5 Life Domains  Patient will attend 50% of groups offered on the unit  Patient will complete a goal card weekly      Outcome: Not Progressing  Goal: Recognize dysfunctional thoughts, communicate reality-based thoughts at the time of discharge  Description  Interventions:  - Provide medication and psycho-education to assist patient in compliance and developing insight into his/her illness   Outcome: Not Progressing  Goal: Complete daily ADLs, including personal hygiene independently, as able  Description  Interventions:  - Observe, teach, and assist patient with ADLS  - Monitor and promote a balance of rest/activity, with adequate nutrition and elimination   Outcome: Not Progressing     Problem: Ineffective Coping  Goal: Participates in unit activities  Description  Interventions:  - Provide therapeutic environment   - Provide required programming   - Redirect inappropriate behaviors   Outcome: Not Progressing     Problem: Risk for Self Injury/Neglect  Goal: Attend and participate in unit activities, including therapeutic, recreational, and educational groups  Description  Interventions:  - Provide therapeutic and educational activities daily, encourage attendance and participation, and document same in the medical record  - Obtain collateral information, encourage visitation and family involvement in care   Outcome: Not Progressing  Goal: Recognize maladaptive responses and adopt new coping mechanisms  Outcome: Not Progressing  Goal: Complete daily ADLs, including personal hygiene independently, as able  Description  Interventions:  - Observe, teach, and assist patient with ADLS  - Monitor and promote a balance of rest/activity, with adequate nutrition and elimination  Outcome: Not Progressing     Problem: Depression  Goal: Treatment Goal: Demonstrate behavioral control of depressive symptoms, verbalize feelings of improved mood/affect, and adopt new coping skills prior to discharge  Outcome: Not Progressing  Goal: Refrain from self-neglect  Outcome: Not Progressing   Mostly isolative to her room, sleeping in her bed but came out for meds and meals  Did not attend any of the offered groups  Ate 75% of breakfast and 50% of lunch  Med compliant  Asked about scheduled Abilify with intent to refuse and was pleased when told that it had been discontinued  No other behaviors or issues noted  Continue to monitor

## 2020-05-29 NOTE — PROGRESS NOTES
[facility] did not attend PM group  She did do her Incentive Spirometry achieving 1250ml consistently  She had HS snack, came out for HS medicine except the Singulair  Wearing now her QHS humidified nasal O2 @ 1L for bed

## 2020-05-29 NOTE — PROGRESS NOTES
05/29/20 1100   Activity/Group Checklist   Group Other (Comment)  ([facility] - Graduation)   Attendance Did not attend     Patient was encouraged to attend, but declined  She was in bed when prompted

## 2020-05-29 NOTE — PROGRESS NOTES
Psychiatry Progress Note [address] 60 y o  female MRN: [id number]  Unit/Bed#: MARCIO ADAIR [facility] 677-84 Encounter: [id number]  Code Status: Level 1 - Full Code    PCP: Srinivasan Charles PA-C    Date of Admission:  7/23/2019 1730   Date of Service:  05/29/20  Patient Active Problem List   Diagnosis    COPD with asthma ([facility]    Tobacco use disorder, continuous    Compression fracture of L4 lumbar vertebra    Ventral hernia    Acute on chronic respiratory failure with hypoxia ([facility] )    [facility], [facility]    Acquired hypothyroidism    Gastroesophageal reflux disease without esophagitis    Abnormal CT of the chest    Excessive cerumen in left ear canal    Lipoma of right upper extremity    Noncompliant with deep vein thrombosis (DVT) prophylaxis    At risk for aspiration    Ear ache    Tachycardia    Chest pain    Low HDL (under 40)     Diagnosis schizoaffective bipolar  Assessment    Overall Status:  Took a shower after over a week but without using shampoo still psychosomatic thinking that she would die if she to resist her hands to apply shampoo on her hair asking staff if she is blue in color Certification Statement to demonstrate a period of stability by attending to ADLs and becoming less psychosomatic in attending more groups Acceptance by patient:  Accepting  Ray Wick in recovery:  Living at another personal care home   Understanding of medications:  Patient is aware about risks side effects benefits and precautions of medications   Involved in reintegration process:   On hold due to [address] in relationship with psychiatrist:  Trusting sometimes    Medication changes   None today  Non-pharmacological treatments   Waiting for your culture and sensitivity report   Continue with individual, group, milieu and occupational therapy using recovery principles and psycho-education about accepting illness and the need for treatment   Encourage to take showers twice a week and cooperate with treatment and adl skills as per her behav  Plan   Therapeutic passes on hold due to covid 19 lock down   Prepare for the [facility] and encouraged to accept  rather than refuse it   Reminded to attend at least 25% of groups on a daily basis including IMR   Encouraged to start taking the Abilify and the increased Zoloft    Safety   Safety and communication plan established to target dynamic risk factors discussed above  Discharge Plan   · back to a [facility] [place] [address] to have psychosomatic symptoms of dying from being blue in color if she raised her hands to apply shampoo so she took a shower without applying shampoo yesterday  By yesterday she was disheveled unkempt with a body odor but today she appears fairly better groomed and is eager to please me telling me that she took a shower and started attending groups  She continues to have the same psychosomatic symptoms as before and is still suspicious about certain staff tampering with her medications and oxygen concentrator  She understands that there is a bed open for her at the [facility] and she needs to work on her ADLs and improved group attendance to be able to go to the [facility]  She has not voiced any overt delusions other than some psychosomatic symptoms with hypochondriasis and is picky with her food intake but has not lost too much weight  Sleep:   Fair  Appetite:   Fair but she picks and chooses her food  Compliance with medications:  Good  Side effects:   none but claims to feel tired sometimes  Review of systems:  Continues to have psychosomatic symptoms as usual    Mental Status Exam  Appearance:    Patient was found in the dining room waiting for breakfast , better groomed Looks older than her stated age,and not very well kept, with uncombed hairt but  wearing clean clothing   Anxious preoccupied    Has good eye contact   Suspicious in her interaction  Behavior:    Superficially friendly pleasant but anxious suspicious and preoccupied  Speech:    tangential at times with tendency to become stilted   Mood:     anxious irritated times,    Affect:    increased in intensity range mood congruent redirectable  Thought Process:   Circumstantial with tendency to have stilted speech   Thought Content:   Still paranoid about motives of staff switching her medications or tampering with her inhalant or her oxygen concentrator off and on  No preoccupation with violence or suicide  No current suicidal homicidal thoughts intent or plans reported  No phobias but has obsessions and compulsions about examining her pills before she takes them  Danis Zavaleta Psychosomatic about dying by choking from food and from heart attack or from shortness of breath and now from catching Covid-19 which are all ongoing beliefs  She believe she may turn blue and die if she lifts her hands up to apply shampoo on her head  Perceptual Disturbances:  No longer claims to hear voices   Risk Potential:   Ability to care for herself and refusal to take showers  Sensorium:   fully oriented to place, person, time, date, day, month, year and to situation  Cognition:    Grossly intact, aware of current events like the corona virus situation, recent and remote memory intact     No language deficit  Consciousness:   Alert and awake  Attention and concentration:  fair  Intellect:    average  Insight:    limited  Judgment:    fair  Motor Activity:  No abnormal involuntary movement noted today    Vitals  Temp:  [97 5 °F (36 4 °C)] 97 5 °F (36 4 °C)  HR:  [75] 75  Resp:  [20] 20  BP: (95)/(64) 95/64  SpO2:  [92 %] 92 %  No intake or output data in the 24 hours ending 05/29/20 9262    Lab Results: No New Labs Available For Today          Current Facility-Administered Medications:  acetaminophen 325 mg Oral Q6H PRN Sindy Day MD   acetaminophen 650 mg Oral Q6H PRN Sindy Day MD acetaminophen 650 mg Oral Q8H PRN Meldon Curling, MD   albuterol 2 puff Inhalation Q4H PRN Meldon Curling, MD   aluminum-magnesium hydroxide-simethicone 15 mL Oral Q4H PRN Meldon Curling, MD   ammonium lactate 1 application Topical BID PRN Meldon Curling, MD   ARIPiprazole 2 mg Oral Daily Meldon Curling, MD   benzonatate 100 mg Oral TID PRN Meldon Curling, MD   benztropine 1 mg Intramuscular Q8H PRN Meldon Curling, MD   carbamide peroxide 5 drop Left Ear BID PRN Meldon Curling, MD   cloZAPine 25 mg Oral BID Meldon Curling, MD   cloZAPine 50 mg Oral BID Meldon Curling, MD   docusate sodium 100 mg Oral BID PRN Meldon Curling, MD   EPINEPHrine PF 0 15 mg Intramuscular Once PRN Meldon Curling, MD   fluticasone-vilanterol 1 puff Inhalation Daily Meldon Curling, MD   ketotifen 1 drop Right Eye BID PRN Meldon Curling, MD   levothyroxine 125 mcg Oral Early Morning Meldon Curling, MD   magnesium hydroxide 30 mL Oral Daily PRN Meldon Curling, MD   montelukast 10 mg Oral HS Meldon Curling, MD   OLANZapine 5 mg Intramuscular Q8H PRN Meldon Curling, MD   OLANZapine 5 mg Oral Q8H PRN Meldon Curling, MD   ondansetron 4 mg Oral Q6H PRN Meldon Curling, MD   pantoprazole 40 mg Oral Early Morning Meldon Curling, MD   polyethylene glycol 17 g Oral Daily PRN Meldon Curling, MD   polyvinyl alcohol 1 drop Both Eyes Q3H PRN Meldon Curling, MD   sertraline 200 mg Oral Daily Meldon Curling, MD   sucralfate 1,000 mg Oral BID Jf Robles MD   theophylline 200 mg Oral Daily Meldon Curling, MD   tiotropium 18 mcg Inhalation Daily Meldon Curling, MD   traZODone 25 mg Oral HS PRN Meldon Curling, MD       Counseling / Coordination of Care: Total floor / unit time spent today 15 minutes  Greater than 50% of total time was spent with the patient and / or family counseling and / or somewhat receptive to supportive listening and teaching positive coping skills to deal with symptom mangement       Patient's Rights, confidentiality and exceptions to confidentiality, use of automated medical record, Jeb Patrick staff access to medical record, and consent to treatment reviewed

## 2020-05-30 NOTE — PLAN OF CARE
Problem: Alteration in Thoughts and Perception  Goal: Verbalize thoughts and feelings  Description  Interventions:  - Promote a nonjudgmental and trusting relationship with the patient through active listening and therapeutic communication  - Assess patient's level of functioning, behavior and potential for risk  - Engage patient in 1 on 1 interactions for a minimum of 15 minutes each session  - Encourage patient to express fears, feelings, frustrations, and discuss symptoms    - [place] patient to reality, help patient recognize reality-based thinking   - Administer medications as ordered and assess for potential side effects  - Provide the patient education related to the signs and symptoms of the illness and desired effects of prescribed medications  Outcome: Progressing  Goal: Agree to be compliant with medication regime, as prescribed and report medication side effects  Description  Interventions:  - Offer appropriate PRN medication and supervise ingestion; conduct aims, as needed   Outcome: Progressing     Problem: Ineffective Coping  Goal: Patient/Family verbalizes awareness of resources  Outcome: Progressing  Goal: Understands least restrictive measures  Description  Interventions:  - Utilize least restrictive behavior  Outcome: Progressing     Problem: Risk for Self Injury/Neglect  Goal: Treatment Goal: Remain safe during length of stay, learn and adopt new coping skills, and be free of self-injurious ideation, impulses and acts at the time of discharge  Outcome: Progressing  Goal: Verbalize thoughts and feelings  Description  Interventions:  - Assess and re-assess patient's lethality and potential for self-injury  - Engage patient in 1:1 interactions, daily, for a minimum of 15 minutes  - Encourage patient to express feelings, fears, frustrations, hopes  - Establish rapport/trust with patient   Outcome: Progressing  Goal: Refrain from harming self  Description  Interventions:  - Monitor patient closely, per order  - Develop a trusting relationship  - Supervise medication ingestion, monitor effects and side effects   Outcome: Progressing     Problem: Depression  Goal: Verbalize thoughts and feelings  Description  Interventions:  - Assess and re-assess patient's level of risk   - Engage patient in 1:1 interactions, daily, for a minimum of 15 minutes   - Encourage patient to express feelings, fears, frustrations, hopes   Outcome: Progressing     Problem: Anxiety  Goal: Anxiety is at manageable level  Description  Interventions:  - Assess and monitor patient's anxiety level  - Monitor for signs and symptoms of anxiety both physical and emotional (heart palpitations, chest pain, shortness of breath, headaches, nausea, feeling jumpy, restlessness, irritable, apprehensive)  - Collaborate with interdisciplinary team and initiate plan and interventions as ordered    - [place] patient to unit/surroundings  - Explain treatment plan  - Encourage participation in care  - Encourage verbalization of concerns/fears  - Identify coping mechanisms  - Assist in developing anxiety-reducing skills  - Administer/offer alternative therapies  - Limit or eliminate stimulants  Outcome: Progressing     Problem: Alteration in Orientation  Goal: Interact with staff daily  Description  Interventions:  - Assess and re-assess patient's level of orientation  - Engage patient in 1 on 1 interactions, daily, for a minimum of 15 minutes   - Establish rapport/trust with patient   Outcome: Progressing     Problem: PAIN - ADULT  Goal: Verbalizes/displays adequate comfort level or baseline comfort level  Description  Interventions:  - Encourage patient to monitor pain and request assistance  - Assess pain using appropriate pain scale  - Administer analgesics based on type and severity of pain and evaluate response  - Implement non-pharmacological measures as appropriate and evaluate response  - Consider cultural and social influences on pain and pain management  - Notify physician/advanced practitioner if interventions unsuccessful or patient reports new pain  Outcome: Progressing     Problem: SAFETY ADULT  Goal: Patient will remain free of falls  Description  INTERVENTIONS:  - Assess patient frequently for physical needs  -  Identify cognitive and physical deficits and behaviors that affect risk of falls  -  [place] fall precautions as indicated by assessment   - Educate patient/family on patient safety including physical limitations  - Instruct patient to call for assistance with activity based on assessment  - Modify environment to reduce risk of injury  - Consider OT/PT consult to assist with strengthening/mobility  Outcome: Progressing     Problem: Alteration in Thoughts and Perception  Goal: Treatment Goal: Gain control of psychotic behaviors/thinking, reduce/eliminate presenting symptoms and demonstrate improved reality functioning upon discharge  Outcome: Not Progressing  Goal: Attend and participate in unit activities, including therapeutic, recreational, and educational groups  Description  Interventions:  - Provide therapeutic and educational activities daily, encourage attendance and participation, and document same in the medical record     CERTIFIED PEER SPECIALIST INTERVENTIONS:    Complete peer assessment with patient to assess their needs and identify their goals to complete while in the recovery program as well as once discharged into the community  Patient will complete WRAP Plan, Crisis Plan and 5 Life Domains  Patient will attend 50% of groups offered on the unit  Patient will complete a goal card weekly      Outcome: Not Progressing  Goal: Recognize dysfunctional thoughts, communicate reality-based thoughts at the time of discharge  Description  Interventions:  - Provide medication and psycho-education to assist patient in compliance and developing insight into his/her illness   Outcome: Not Progressing  Goal: Complete daily ADLs, including personal hygiene independently, as able  Description  Interventions:  - Observe, teach, and assist patient with ADLS  - Monitor and promote a balance of rest/activity, with adequate nutrition and elimination   Outcome: Not Progressing     Problem: Ineffective Coping  Goal: Participates in unit activities  Description  Interventions:  - Provide therapeutic environment   - Provide required programming   - Redirect inappropriate behaviors   Outcome: Not Progressing     Problem: Risk for Self Injury/Neglect  Goal: Attend and participate in unit activities, including therapeutic, recreational, and educational groups  Description  Interventions:  - Provide therapeutic and educational activities daily, encourage attendance and participation, and document same in the medical record  - Obtain collateral information, encourage visitation and family involvement in care   Outcome: Not Progressing  Goal: Recognize maladaptive responses and adopt new coping mechanisms  Outcome: Not Progressing  Goal: Complete daily ADLs, including personal hygiene independently, as able  Description  Interventions:  - Observe, teach, and assist patient with ADLS  - Monitor and promote a balance of rest/activity, with adequate nutrition and elimination  Outcome: Not Progressing     Problem: Depression  Goal: Treatment Goal: Demonstrate behavioral control of depressive symptoms, verbalize feelings of improved mood/affect, and adopt new coping skills prior to discharge  Outcome: Not Progressing  Goal: Refrain from isolation  Description  Interventions:  - Develop a trusting relationship   - Encourage socialization   Outcome: Not Progressing  Goal: Refrain from self-neglect  Outcome: Not Progressing     Problem: Nutrition/Hydration-ADULT  Goal: Nutrient/Hydration intake appropriate for improving, restoring or maintaining nutritional needs  Description  Monitor and assess patient's nutrition/hydration status for malnutrition  Collaborate with interdisciplinary team and initiate plan and interventions as ordered  Monitor patient's weight and dietary intake as ordered or per policy  Utilize nutrition screening tool and intervene as necessary  Determine patient's food preferences and provide high-protein, high-caloric foods as appropriate  INTERVENTIONS:  - Monitor oral intake, urinary output, labs, and treatment plans  - Assess nutrition and hydration status and recommend course of action  - Evaluate amount of meals eaten  - Assist patient with eating if necessary   - Allow adequate time for meals  - Recommend/ encourage appropriate diets, oral nutritional supplements, and vitamin/mineral supplements  - Order, calculate, and assess calorie counts as needed  - Recommend, monitor, and adjust tube feedings and TPN/PPN based on assessed needs  - Assess need for intravenous fluids  - Provide specific nutrition/hydration education as appropriate  - Include patient/family/caregiver in decisions related to nutrition  Outcome: Not Progressing    Mostly isolative to her room, sleeping in her bed but came out for meds and meals  Did not attend any of the offered groups  Ate 50% of breakfast and 10% of lunch (the cookies and milk)  Med compliant  Asked about scheduled Abilify, reminded her that it was discontinued yesterday  Remains delusional, paranoid, and suspicious  Was questioning why night shift MHT took her concentrator out of her room at shift change  Said she didn't trust him and thought he was "up to something" since "day shift is supposed to remove it" Reassured her that is not the case and that it's merely shift change, and that he had been told by the other day shift nurse that it was time to bring it out and put it away  She partially accepted that answer, but remained suspicious  No other behaviors or issues noted  Continue to monitor

## 2020-05-30 NOTE — PROGRESS NOTES
Progress Note - Behavioral Health   Violetta Niles 60 y o  female MRN: [id number]  Unit/Bed#: MARCIO ADAIR [facility] 111-01 Encounter: [id number]    The patient was seen for continuing care and reviewed with treatment team     Vital signs in last 24 hours:  Temp:  [97 2 °F (36 2 °C)-98 1 °F (36 7 °C)] 97 2 °F (36 2 °C)  HR:  [84-95] 94  Resp:  [18] 18  BP: ()/(52-68) 93/68    Mental Status Evaluation:    Appearance Marginal/poor hygiene   Behavior cooperative   Mood anxious and depressed   Speech Normal rate and volume   Affect mood-congruent   Thought Processes Goal directed and coherent   Thought Content Somatic delusions   Perceptual Disturbances Denies hallucinations and does not appear to be responding to internal stimuli   Risk Potential Suicidal/Homicidal Ideation - No evidence of suicidal or homicidal ideation and Patient does not verbalize suicidal or homicidal ideation  Risk of Violence - No evidence of risk for violence found on assessment  Risk of Self Mutilation - No evidence of risk for self mutilation found on assessment   Sensorium oriented to person, place, time/date and situation   Cognition/Memory recent and remote memory grossly intact   Consciousness alert and awake   Attention/Concentration attention span and concentration are age appropriate   Insight poor   Judgement poor   Muscle Strength and Gait/Station normal muscle strength and normal muscle tone, normal gait/station and normal balance   Motor Activity no abnormal movements       Progress Toward Goals:  Staff report patient spends majority of time in bed  Patient does not participate in groups and has very poor motivation with little motivation to change  Patient continues to have multiple somatic complaints  Patient currently complains of frequent urination  Patient verbalizes paranoid ideations  Patient states she believes she has UTI despite negative urinalysis    Patient states she believes someone is putting something in her humidifier that is causing her frequent urination  Patient is distrustful of some nursing staff verbalizing belief that there is some conspiracy against her related to taking away her oxygen concentrator 2 hours early  States she is eating and sleeping well  No specific medication complaints  Recommended Treatment: Continue with pharmacotherapy, group therapy, milieu therapy and occupational therapy    The patient will be maintained on the following medications:    Current Facility-Administered Medications:  acetaminophen 325 mg Oral Q6H PRN Cole Prater MD   acetaminophen 650 mg Oral Q6H PRN Cole Prater MD   acetaminophen 650 mg Oral Q8H PRN Cole Prater MD   albuterol 2 puff Inhalation Q4H PRN Cole Prater MD   aluminum-magnesium hydroxide-simethicone 15 mL Oral Q4H PRN Cole Prater MD   ammonium lactate 1 application Topical BID PRN Cole Prater MD   benzonatate 100 mg Oral TID PRN Cole Prater MD   benztropine 1 mg Intramuscular Q8H PRN Cole Prater MD   carbamide peroxide 5 drop Left Ear BID PRN Cole Prater MD   cloZAPine 25 mg Oral BID Cole Prater MD   cloZAPine 50 mg Oral BID Cole Prater MD   docusate sodium 100 mg Oral BID PRN Cole Prater MD   EPINEPHrine PF 0 15 mg Intramuscular Once PRN Cole Prater MD   fluticasone-vilanterol 1 puff Inhalation Daily Cole Prater MD   ketotifen 1 drop Right Eye BID PRN Cole Prater MD   levothyroxine 125 mcg Oral Early Morning Cole Prater MD   magnesium hydroxide 30 mL Oral Daily PRN Cole Prater MD   montelukast 10 mg Oral HS Cole Prater MD   OLANZapine 5 mg Intramuscular Q8H PRN Cole Prater MD   OLANZapine 5 mg Oral Q8H PRN Cole Prater MD   ondansetron 4 mg Oral Q6H PRN Cole Prater MD   pantoprazole 40 mg Oral Early Morning Cole Prater MD   polyethylene glycol 17 g Oral Daily PRN Cole Prater MD   polyvinyl alcohol 1 drop Both Eyes Q3H PRN Cole Prater MD   sertraline 200 mg Oral Daily Cole Prater MD   sucralfate 1,000 mg Oral BID Luis Wang MD theophylline 200 mg Oral Daily Jennifer Taveras MD   tiotropium 18 mcg Inhalation Daily Jennifer Taveras MD   traZODone 25 mg Oral HS PRN Jennifer Taveras MD       Schizoaffective disorder, bipolar type [facility])

## 2020-05-30 NOTE — PROGRESS NOTES
2[address] Chanda Rod did not perform her Incentive Spirometry tonight  "It's hard to do when you first wake up " (Almost every night she has done it, it has been after the nurse awakens her as she is almost always asleep, NOT a new routine tonight ) She continues to c/o urinary frequency (NOT observed)  "But, it's clear @ least " She attributes the latter as to why she lays in bed  Did have an HS snack, came for HS medicine except the Singulair  Wearing now her QHS humidified nasal O2 @ 1L for bed

## 2020-05-31 NOTE — PLAN OF CARE
Problem: Alteration in Thoughts and Perception  Goal: Verbalize thoughts and feelings  Description  Interventions:  - Promote a nonjudgmental and trusting relationship with the patient through active listening and therapeutic communication  - Assess patient's level of functioning, behavior and potential for risk  - Engage patient in 1 on 1 interactions for a minimum of 15 minutes each session  - Encourage patient to express fears, feelings, frustrations, and discuss symptoms    - [place] patient to reality, help patient recognize reality-based thinking   - Administer medications as ordered and assess for potential side effects  - Provide the patient education related to the signs and symptoms of the illness and desired effects of prescribed medications  Outcome: Progressing  Goal: Agree to be compliant with medication regime, as prescribed and report medication side effects  Description  Interventions:  - Offer appropriate PRN medication and supervise ingestion; conduct aims, as needed   Outcome: Progressing     Problem: Ineffective Coping  Goal: Understands least restrictive measures  Description  Interventions:  - Utilize least restrictive behavior  Outcome: Progressing     Problem: Risk for Self Injury/Neglect  Goal: Treatment Goal: Remain safe during length of stay, learn and adopt new coping skills, and be free of self-injurious ideation, impulses and acts at the time of discharge  Outcome: Progressing  Goal: Verbalize thoughts and feelings  Description  Interventions:  - Assess and re-assess patient's lethality and potential for self-injury  - Engage patient in 1:1 interactions, daily, for a minimum of 15 minutes  - Encourage patient to express feelings, fears, frustrations, hopes  - Establish rapport/trust with patient   Outcome: Progressing  Goal: Refrain from harming self  Description  Interventions:  - Monitor patient closely, per order  - Develop a trusting relationship  - Supervise medication ingestion, monitor effects and side effects   Outcome: Progressing     Problem: Depression  Goal: Verbalize thoughts and feelings  Description  Interventions:  - Assess and re-assess patient's level of risk   - Engage patient in 1:1 interactions, daily, for a minimum of 15 minutes   - Encourage patient to express feelings, fears, frustrations, hopes   Outcome: Progressing     Problem: Anxiety  Goal: Anxiety is at manageable level  Description  Interventions:  - Assess and monitor patient's anxiety level  - Monitor for signs and symptoms of anxiety both physical and emotional (heart palpitations, chest pain, shortness of breath, headaches, nausea, feeling jumpy, restlessness, irritable, apprehensive)  - Collaborate with interdisciplinary team and initiate plan and interventions as ordered    - [place] patient to unit/surroundings  - Explain treatment plan  - Encourage participation in care  - Encourage verbalization of concerns/fears  - Identify coping mechanisms  - Assist in developing anxiety-reducing skills  - Administer/offer alternative therapies  - Limit or eliminate stimulants  Outcome: Progressing     Problem: Alteration in Orientation  Goal: Interact with staff daily  Description  Interventions:  - Assess and re-assess patient's level of orientation  - Engage patient in 1 on 1 interactions, daily, for a minimum of 15 minutes   - Establish rapport/trust with patient   Outcome: Progressing     Problem: PAIN - ADULT  Goal: Verbalizes/displays adequate comfort level or baseline comfort level  Description  Interventions:  - Encourage patient to monitor pain and request assistance  - Assess pain using appropriate pain scale  - Administer analgesics based on type and severity of pain and evaluate response  - Implement non-pharmacological measures as appropriate and evaluate response  - Consider cultural and social influences on pain and pain management  - Notify physician/advanced practitioner if interventions unsuccessful or patient reports new pain  Outcome: Progressing     Problem: SAFETY ADULT  Goal: Patient will remain free of falls  Description  INTERVENTIONS:  - Assess patient frequently for physical needs  -  Identify cognitive and physical deficits and behaviors that affect risk of falls  -  [place] fall precautions as indicated by assessment   - Educate patient/family on patient safety including physical limitations  - Instruct patient to call for assistance with activity based on assessment  - Modify environment to reduce risk of injury  - Consider OT/PT consult to assist with strengthening/mobility  Outcome: Progressing     Problem: Nutrition/Hydration-ADULT  Goal: Nutrient/Hydration intake appropriate for improving, restoring or maintaining nutritional needs  Description  Monitor and assess patient's nutrition/hydration status for malnutrition  Collaborate with interdisciplinary team and initiate plan and interventions as ordered  Monitor patient's weight and dietary intake as ordered or per policy  Utilize nutrition screening tool and intervene as necessary  Determine patient's food preferences and provide high-protein, high-caloric foods as appropriate       INTERVENTIONS:  - Monitor oral intake, urinary output, labs, and treatment plans  - Assess nutrition and hydration status and recommend course of action  - Evaluate amount of meals eaten  - Assist patient with eating if necessary   - Allow adequate time for meals  - Recommend/ encourage appropriate diets, oral nutritional supplements, and vitamin/mineral supplements  - Order, calculate, and assess calorie counts as needed  - Recommend, monitor, and adjust tube feedings and TPN/PPN based on assessed needs  - Assess need for intravenous fluids  - Provide specific nutrition/hydration education as appropriate  - Include patient/family/caregiver in decisions related to nutrition  Outcome: Progressing     Problem: Alteration in Thoughts and Perception  Goal: Treatment Goal: Gain control of psychotic behaviors/thinking, reduce/eliminate presenting symptoms and demonstrate improved reality functioning upon discharge  Outcome: Not Progressing  Goal: Attend and participate in unit activities, including therapeutic, recreational, and educational groups  Description  Interventions:  - Provide therapeutic and educational activities daily, encourage attendance and participation, and document same in the medical record     CERTIFIED PEER SPECIALIST INTERVENTIONS:    Complete peer assessment with patient to assess their needs and identify their goals to complete while in the recovery program as well as once discharged into the community  Patient will complete WRAP Plan, Crisis Plan and 5 Life Domains  Patient will attend 50% of groups offered on the unit  Patient will complete a goal card weekly      Outcome: Not Progressing  Goal: Recognize dysfunctional thoughts, communicate reality-based thoughts at the time of discharge  Description  Interventions:  - Provide medication and psycho-education to assist patient in compliance and developing insight into his/her illness   Outcome: Not Progressing     Problem: Ineffective Coping  Goal: Participates in unit activities  Description  Interventions:  - Provide therapeutic environment   - Provide required programming   - Redirect inappropriate behaviors   Outcome: Not Progressing     Problem: Risk for Self Injury/Neglect  Goal: Attend and participate in unit activities, including therapeutic, recreational, and educational groups  Description  Interventions:  - Provide therapeutic and educational activities daily, encourage attendance and participation, and document same in the medical record  - Obtain collateral information, encourage visitation and family involvement in care   Outcome: Not Progressing  Goal: Recognize maladaptive responses and adopt new coping mechanisms  Outcome: Not Progressing  Goal: Complete daily ADLs, including personal hygiene independently, as able  Description  Interventions:  - Observe, teach, and assist patient with ADLS  - Monitor and promote a balance of rest/activity, with adequate nutrition and elimination  Outcome: Not Progressing     Problem: Depression  Goal: Treatment Goal: Demonstrate behavioral control of depressive symptoms, verbalize feelings of improved mood/affect, and adopt new coping skills prior to discharge  Outcome: Not Progressing  Goal: Refrain from isolation  Description  Interventions:  - Develop a trusting relationship   - Encourage socialization   Outcome: Not Progressing  Goal: Refrain from self-neglect  Outcome: Not Progressing   Mostly isolative to her room, sleeping in her bed and did not attend any of the offered groups  No delusions voiced but appears wary and suspicious  Out for meds and meals  Ate 100% of breakfast and 20% of lunch (milk and ice cream)  No other behaviors or issues noted  Continue to monitor

## 2020-05-31 NOTE — PROGRESS NOTES
Progress Note - Behavioral Health   Nina Bello 60 y o  female MRN: [id number]  Unit/Bed#: [facility] 111-01 Encounter: [id number]    The patient was seen for continuing care and reviewed with treatment team     Schizoaffective disorder, bipolar type ([address] )    Vital signs in last 24 hours:  Temp:  [97 1 °F (36 2 °C)-97 7 °F (36 5 °C)] 97 1 °F (36 2 °C)  HR:  [69-87] 72  Resp:  [16] 16  BP: ()/(64-71) 95/64    Mental Status Evaluation:    Appearance Adequate hygiene and grooming   Behavior calm and cooperative   Mood euthymic   Speech Normal rate and volume   Affect mood-congruent   Thought Processes Goal directed and coherent   Thought Content Paranoid and mistrustful   Perceptual Disturbances Denies hallucinations and does not appear to be responding to internal stimuli   Risk Potential Suicidal/Homicidal Ideation - No evidence of suicidal or homicidal ideation and Patient does not verbalize suicidal or homicidal ideation  Risk of Violence - No evidence of risk for violence found on assessment  Risk of Self Mutilation - No evidence of risk for self mutilation found on assessment   Sensorium oriented to person, place, time/date and situation   Cognition/Memory recent and remote memory grossly intact   Consciousness alert and awake   Attention/Concentration attention span and concentration are age appropriate   Insight poor   Judgement poor   Muscle Strength and Gait/Station normal muscle strength and normal muscle tone, normal gait/station and normal balance   Motor Activity no abnormal movements       Progress Toward Goals:  Patient observed lying in bed  Patient is more pleasant with a less anxious and depressed presentation today  Patient does not volunteer paranoid or delusional material but does endorse paranoid thinking when asked about prior paranoid ideations  Patient states she is eating and sleeping well  No specific medication complaints          Recommended Treatment: Continue with pharmacotherapy, group therapy, milieu therapy and occupational therapy    The patient will be maintained on the following medications:    Current Facility-Administered Medications:  acetaminophen 325 mg Oral Q6H PRN Zander Yanez MD   acetaminophen 650 mg Oral Q6H PRN Zander Yanez MD   acetaminophen 650 mg Oral Q8H PRN Zander Yanez MD   albuterol 2 puff Inhalation Q4H PRN Zander Yanez MD   aluminum-magnesium hydroxide-simethicone 15 mL Oral Q4H PRN Zander Yanez MD   ammonium lactate 1 application Topical BID PRN Zander Yanez MD   benzonatate 100 mg Oral TID PRN Zander Yanez MD   benztropine 1 mg Intramuscular Q8H PRN Zander Yanez MD   carbamide peroxide 5 drop Left Ear BID PRN Zander Yanez MD   cloZAPine 25 mg Oral BID Zander Yanez MD   cloZAPine 50 mg Oral BID Zander Yanez MD   docusate sodium 100 mg Oral BID PRN Zander Yanez MD   EPINEPHrine PF 0 15 mg Intramuscular Once PRN Zander Yanez MD   fluticasone-vilanterol 1 puff Inhalation Daily Zander Yanez MD   ketotifen 1 drop Right Eye BID PRN Zander Yanez MD   levothyroxine 125 mcg Oral Early Morning Zander Yanez MD   magnesium hydroxide 30 mL Oral Daily PRN Zander Yanez MD   montelukast 10 mg Oral HS Zander Yanez MD   OLANZapine 5 mg Intramuscular Q8H PRN Zander Yanez MD   OLANZapine 5 mg Oral Q8H PRN Zander Yanez MD   ondansetron 4 mg Oral Q6H PRN Zander Yanez MD   pantoprazole 40 mg Oral Early Morning Zander Yanez MD   polyethylene glycol 17 g Oral Daily PRN Zander Yanez MD   polyvinyl alcohol 1 drop Both Eyes Q3H PRN Zander Yanez MD   sertraline 200 mg Oral Daily Zander Yanez MD   sucralfate 1,000 mg Oral BID Tomás Layne MD   theophylline 200 mg Oral Daily Zander Yanez MD   tiotropium 18 mcg Inhalation Daily Zander Yanez MD   traZODone 25 mg Oral HS PRN Zander Yanez MD       Schizoaffective disorder, bipolar [facility] ([facility] )

## 2020-05-31 NOTE — PLAN OF CARE
Problem: Alteration in Thoughts and Perception  Goal: Verbalize thoughts and feelings  Description  Interventions:  - Promote a nonjudgmental and trusting relationship with the patient through active listening and therapeutic communication  - Assess patient's level of functioning, behavior and potential for risk  - Engage patient in 1 on 1 interactions for a minimum of 15 minutes each session  - Encourage patient to express fears, feelings, frustrations, and discuss symptoms    - [place] patient to reality, help patient recognize reality-based thinking   - Administer medications as ordered and assess for potential side effects  - Provide the patient education related to the signs and symptoms of the illness and desired effects of prescribed medications  Outcome: Progressing  Goal: Agree to be compliant with medication regime, as prescribed and report medication side effects  Description  Interventions:  - Offer appropriate PRN medication and supervise ingestion; conduct aims, as needed   Outcome: Progressing     Ate 10% of dinner +ensure for dinner, did not attend groups, visible, social with select peers, compliant with routine medications,  will continue to monitor

## 2020-06-01 NOTE — TREATMENT TEAM
06/01/20 1100   Activity/Group Checklist   Group   (IMR)   Attendance Attended   Attendance Duration (min) 46-60   Interactions Did not interact   Affect/Mood Appropriate; Constricted   Goals Achieved Able to listen to others

## 2020-06-01 NOTE — PLAN OF CARE
Problem: Alteration in Thoughts and Perception  Goal: Agree to be compliant with medication regime, as prescribed and report medication side effects  Description  Interventions:  - Offer appropriate PRN medication and supervise ingestion; conduct aims, as needed   Outcome: Progressing     Problem: Alteration in Thoughts and Perception  Goal: Attend and participate in unit activities, including therapeutic, recreational, and educational groups  Description  Interventions:  - Provide therapeutic and educational activities daily, encourage attendance and participation, and document same in the medical record     CERTIFIED PEER SPECIALIST INTERVENTIONS:    Complete peer assessment with patient to assess their needs and identify their goals to complete while in the recovery program as well as once discharged into the community  Patient will complete WRAP Plan, Crisis Plan and 5 Life Domains  Patient will attend 50% of groups offered on the unit  Patient will complete a goal card weekly      Outcome: Not Progressing       Ate 10% of dinner +ensure for dinner, did not attend groups, visible, social with select peers, compliant with routine medications,  will continue to monitor

## 2020-06-01 NOTE — PLAN OF CARE
Problem: Alteration in Thoughts and Perception  Goal: Agree to be compliant with medication regime, as prescribed and report medication side effects  Description  Interventions:  - Offer appropriate PRN medication and supervise ingestion; conduct aims, as needed   Outcome: Progressing     Problem: Alteration in Thoughts and Perception  Goal: Complete daily ADLs, including personal hygiene independently, as able  Description  Interventions:  - Observe, teach, and assist patient with ADLS  - Monitor and promote a balance of rest/activity, with adequate nutrition and elimination   Outcome: Not Progressing     Problem: Depression  Goal: Refrain from isolation  Description  Interventions:  - Develop a trusting relationship   - Encourage socialization   Outcome: Not Progressing     Problem: Nutrition/Hydration-ADULT  Goal: Nutrient/Hydration intake appropriate for improving, restoring or maintaining nutritional needs  Description  Monitor and assess patient's nutrition/hydration status for malnutrition  Collaborate with interdisciplinary team and initiate plan and interventions as ordered  Monitor patient's weight and dietary intake as ordered or per policy  Utilize nutrition screening tool and intervene as necessary  Determine patient's food preferences and provide high-protein, high-caloric foods as appropriate       INTERVENTIONS:  - Monitor oral intake, urinary output, labs, and treatment plans  - Assess nutrition and hydration status and recommend course of action  - Evaluate amount of meals eaten  - Assist patient with eating if necessary   - Allow adequate time for meals  - Recommend/ encourage appropriate diets, oral nutritional supplements, and vitamin/mineral supplements  - Order, calculate, and assess calorie counts as needed  - Recommend, monitor, and adjust tube feedings and TPN/PPN based on assessed needs  - Assess need for intravenous fluids  - Provide specific nutrition/hydration education as appropriate  - Include patient/family/caregiver in decisions related to nutrition  Outcome: Not Progressing     [address]sen is isolative to her room asleep in bed  She did come out on own for supper medicine, for meal, but, ate nothing; drank only Ensure  Is unkempt & makes no effort to even comb out her tangled matted hair  No shower in 4 days, no shampoo in over a month  Unmotivated to help herself  Did not respond to invitation to optional [employer] group

## 2020-06-01 NOTE — PROGRESS NOTES
Maggie maintained on ongoing fall and SAFE precaution  Tre Hartman in bed with eyes closed, breath even and unlabored   On O2 with humidifier @1L/m via nasal cannula  Continues rounding implemented   No somatic complaint overnight  No PRN needed for sleep aid   No indication of pain or discomfort   Will continue to monitor

## 2020-06-01 NOTE — TREATMENT TEAM
06/01/20 0900   Activity/Group Checklist   Group Community meeting   Attendance Did not attend   Attendance Duration (min) 31-45   Affect/Mood IRMA

## 2020-06-01 NOTE — PROGRESS NOTES
06/01/20 0909   Team Meeting   Meeting Type Daily Rounds   Team Members Present   Team Members Present Physician;;Nurse; Other (Discipline and Name)   Physician Team Member Dr Alfred Whitley [place]   Care Management Team Member Godwin Sparks   Other (Discipline and Name) SHANIKA Flores     No changes, no groups, somatic

## 2020-06-01 NOTE — PROGRESS NOTES
Psychiatry Progress Note [address] 60 y o  female MRN: [id number]  Unit/Bed#: [facility] 111-01 Encounter: [id number]  Code Status: Level 1 - Full Code    PCP: Gabo Stubbs PA-C    Date of Admission:  7/23/2019 1730   Date of Service:  06/01/20  Patient Active Problem List   Diagnosis    COPD with asthma ([facility]    Tobacco use disorder, continuous    Compression fracture of L4 lumbar vertebra    Ventral hernia    Acute on chronic respiratory failure with hypoxia ([facility]    [facility], [facility]    Acquired hypothyroidism    Gastroesophageal reflux disease without esophagitis    Abnormal CT of the chest    Excessive cerumen in left ear canal    Lipoma of right upper extremity    Noncompliant with deep vein thrombosis (DVT) prophylaxis    At risk for aspiration    Ear ache    Tachycardia    Chest pain    Low HDL (under 40)     Diagnosis schizoaffective by  Assessment    Overall Status:  Patient is still psychosomatic and is suspicious not attending all groups or taking showers as she is supposed to and is accusatory of certain staff    Certification Statement to demonstrate a period of stability by attending to ADLs and becoming less psychosomatic in attending more groups Acceptance by patient:  Accepting  Onofre Rice in recovery:  Living at another personal care home   Understanding of medications:  Patient is aware about risks side effects benefits and precautions of medications   Involved in reintegration process: On hold due to [address] in relationship with psychiatrist:  Trusting sometimes    Medication changes   None today  Non-pharmacological treatments   Waiting for your culture and sensitivity report   Continue with individual, group, milieu and occupational therapy using recovery principles and psycho-education about accepting illness and the need for treatment     Encourage to take showers twice a week and cooperate with treatment and adl skills as per her behav  Plan   Therapeutic passes on hold due to covid 19 lock down   Prepare for the [facility] and encouraged to accept  rather than refuse it   Reminded to attend at least 25% of groups on a daily basis including IMR   Encouraged to start taking the Abilify and the increased Zoloft    Safety   Safety and communication plan established to target dynamic risk factors discussed above  Discharge Plan   · back to a [facility] [place]    Interval Progress   Again suspicious of certain staff tampering with her oxygen concentrator and always examines will pills before she takes them  She used to not taking showers is she is supposed to like to use a week and still believes that she is dying and that she needs to complete resistance to taking showers  She comes up with multiple excuses for her not taking showers consistently  She has not voiced any overt delusions but her actions and demeanor indicate that she is paranoid and hypochondriac as usual she knows that she needs to attend at least 25% of groups and attend to ADLs skills to be able to go to the floor room that she already has waiting for her at the [address] [facility] home  Sleep:   Fair  Appetite:   Fair but she picks and chooses her food  Compliance with medications:  Good  Side effects:   none but claims to feel tired sometimes  Review of systems:  Continues to have psychosomatic symptoms as usual  Group attendance:  Poor    Mental Status Exam  Appearance:    Patient was found laying on her bed but did get up when approached , better groomed Looks older than her stated age,and not very well kept, with uncombed hairt but  wearing clean clothing   Anxious preoccupied  Has good eye contact   Suspicious in her interaction      Behavior:    Superficially friendly pleasant but anxious suspicious and preoccupied  Speech:    tangential at times with tendency to become stilted   Mood:     anxious sad and irritated times,    Affect:    increased in intensity range mood congruent redirectable and her affect was brighter towards the end of the interview  Thought Process:   Circumstantial with tendency to have stilted speech   Thought Content:   Remains paranoid about motives of staff switching her medications or tampering with her inhalant or her oxygen concentrator off and on  No preoccupation with violence or suicide  No current suicidal homicidal thoughts intent or plans reported  No phobias but has obsessions and compulsions about examining her pills before she takes them  Iman Jasso Psychosomatic about dying by choking from food and from heart attack or from shortness of breath and now from catching Covid-19 which are all ongoing beliefs  She believe she may turn blue and die if she lifts her hands up to apply shampoo on her head  Perceptual Disturbances:  No longer claims to hear voices   Risk Potential:   Ability to care for herself and refusal to take showers  Sensorium:   fully oriented to place, person, time, date, day, month, year and to situation  Cognition:    Grossly intact, aware of current events like the corona virus situation, recent and remote memory intact     No language deficit  Consciousness:   Alert and awake  Attention and concentration:  fair  Intellect:    average  Insight:    limited  Judgment:    fair  Motor Activity:  No abnormal involuntary movement noted today    Vitals  Temp:  [97 °F (36 1 °C)-98 2 °F (36 8 °C)] 98 2 °F (36 8 °C)  HR:  [88-91] 91  Resp:  [16-18] 18  BP: (101-126)/(63-71) 126/71  SpO2:  [94 %] 94 %    Intake/Output Summary (Last 24 hours) at 6/1/2020 0823  Last data filed at 5/31/2020 1230  Gross per 24 hour   Intake    Output 0 ml   Net 0 ml       Lab Results: No New Labs Available For Today          Current Facility-Administered Medications:  acetaminophen 325 mg Oral Q6H PRN Roberto Shankar MD   acetaminophen 650 mg Oral Q6H PRN Roberto Shankar MD   acetaminophen 650 mg Oral Q8H PRBJORN Willis MD   albuterol 2 puff Inhalation Q4H PRN Carissa Willis MD   aluminum-magnesium hydroxide-simethicone 15 mL Oral Q4H PRN Carissa Willis MD   ammonium lactate 1 application Topical BID PRN Carissa Willis MD   benzonatate 100 mg Oral TID PRN Carissa Willis MD   benztropine 1 mg Intramuscular Q8H PRN Carissa Willis MD   carbamide peroxide 5 drop Left Ear BID PRN Carissa Willis, MD   cloZAPine 25 mg Oral BID Carissa Willis, MD   cloZAPine 50 mg Oral BID Carissa Willis MD   docusate sodium 100 mg Oral BID PRN Carissa Willis MD   EPINEPHrine PF 0 15 mg Intramuscular Once PRN Carissa Willis MD   fluticasone-vilanterol 1 puff Inhalation Daily Carissa Willis MD   ketotifen 1 drop Right Eye BID PRN Carissa Willis MD   levothyroxine 125 mcg Oral Early Morning Carissa Willis MD   magnesium hydroxide 30 mL Oral Daily PRN Carissa Willis MD   montelukast 10 mg Oral HS Carissa Willis MD   OLANZapine 5 mg Intramuscular Q8H PRN Carissa Willis MD   OLANZapine 5 mg Oral Q8H PRN Carissa Willis MD   ondansetron 4 mg Oral Q6H PRN Carissa Willis MD   pantoprazole 40 mg Oral Early Morning Carissa Willis MD   polyethylene glycol 17 g Oral Daily PRN Carissa Willis MD   polyvinyl alcohol 1 drop Both Eyes Q3H PRN Carissa Willis MD   sertraline 200 mg Oral Daily Carissa Willis MD   sucralfate 1,000 mg Oral BID Dennise Litter, MD   theophylline 200 mg Oral Daily Carissa Willis MD   tiotropium 18 mcg Inhalation Daily Carissa Willis MD   traZODone 25 mg Oral HS PRN Carissa Willis MD       Counseling / Coordination of Care: Total floor / unit time spent today 15 minutes  Greater than 50% of total time was spent with the patient and / or family counseling and / or somewhat receptive to supportive listening and teaching positive coping skills to deal with symptom mangement  Patient's Rights, confidentiality and exceptions to confidentiality, use of automated medical record, [facility] staff access to medical record, and consent to treatment reviewed

## 2020-06-01 NOTE — PLAN OF CARE
Problem: Alteration in Thoughts and Perception  Goal: Treatment Goal: Gain control of psychotic behaviors/thinking, reduce/eliminate presenting symptoms and demonstrate improved reality functioning upon discharge  Outcome: Progressing  Goal: Verbalize thoughts and feelings  Description  Interventions:  - Promote a nonjudgmental and trusting relationship with the patient through active listening and therapeutic communication  - Assess patient's level of functioning, behavior and potential for risk  - Engage patient in 1 on 1 interactions for a minimum of 15 minutes each session  - Encourage patient to express fears, feelings, frustrations, and discuss symptoms    - [place] patient to reality, help patient recognize reality-based thinking   - Administer medications as ordered and assess for potential side effects  - Provide the patient education related to the signs and symptoms of the illness and desired effects of prescribed medications  Outcome: Progressing  Goal: Agree to be compliant with medication regime, as prescribed and report medication side effects  Description  Interventions:  - Offer appropriate PRN medication and supervise ingestion; conduct aims, as needed   Outcome: Progressing  Goal: Attend and participate in unit activities, including therapeutic, recreational, and educational groups  Description  Interventions:  - Provide therapeutic and educational activities daily, encourage attendance and participation, and document same in the medical record     CERTIFIED PEER SPECIALIST INTERVENTIONS:    Complete peer assessment with patient to assess their needs and identify their goals to complete while in the recovery program as well as once discharged into the community  Patient will complete WRAP Plan, Crisis Plan and 5 Life Domains  Patient will attend 50% of groups offered on the unit  Patient will complete a goal card weekly      Outcome: Progressing  Goal: Recognize dysfunctional thoughts, communicate reality-based thoughts at the time of discharge  Description  Interventions:  - Provide medication and psycho-education to assist patient in compliance and developing insight into his/her illness   Outcome: Progressing     Problem: Ineffective Coping  Goal: Participates in unit activities  Description  Interventions:  - Provide therapeutic environment   - Provide required programming   - Redirect inappropriate behaviors   Outcome: Progressing  Goal: Patient/Family verbalizes awareness of resources  Outcome: Progressing  Goal: Understands least restrictive measures  Description  Interventions:  - Utilize least restrictive behavior  Outcome: Progressing     Problem: Risk for Self Injury/Neglect  Goal: Treatment Goal: Remain safe during length of stay, learn and adopt new coping skills, and be free of self-injurious ideation, impulses and acts at the time of discharge  Outcome: Progressing  Goal: Verbalize thoughts and feelings  Description  Interventions:  - Assess and re-assess patient's lethality and potential for self-injury  - Engage patient in 1:1 interactions, daily, for a minimum of 15 minutes  - Encourage patient to express feelings, fears, frustrations, hopes  - Establish rapport/trust with patient   Outcome: Progressing  Goal: Refrain from harming self  Description  Interventions:  - Monitor patient closely, per order  - Develop a trusting relationship  - Supervise medication ingestion, monitor effects and side effects   Outcome: Progressing  Goal: Attend and participate in unit activities, including therapeutic, recreational, and educational groups  Description  Interventions:  - Provide therapeutic and educational activities daily, encourage attendance and participation, and document same in the medical record  - Obtain collateral information, encourage visitation and family involvement in care   Outcome: Progressing     Problem: Depression  Goal: Treatment Goal: Demonstrate behavioral control of depressive symptoms, verbalize feelings of improved mood/affect, and adopt new coping skills prior to discharge  Outcome: Progressing  Goal: Verbalize thoughts and feelings  Description  Interventions:  - Assess and re-assess patient's level of risk   - Engage patient in 1:1 interactions, daily, for a minimum of 15 minutes   - Encourage patient to express feelings, fears, frustrations, hopes   Outcome: Progressing  Goal: Refrain from isolation  Description  Interventions:  - Develop a trusting relationship   - Encourage socialization   Outcome: Progressing     Problem: Anxiety  Goal: Anxiety is at manageable level  Description  Interventions:  - Assess and monitor patient's anxiety level  - Monitor for signs and symptoms of anxiety both physical and emotional (heart palpitations, chest pain, shortness of breath, headaches, nausea, feeling jumpy, restlessness, irritable, apprehensive)  - Collaborate with interdisciplinary team and initiate plan and interventions as ordered    - [employer] patient to unit/surroundings  - Explain treatment plan  - Encourage participation in care  - Encourage verbalization of concerns/fears  - Identify coping mechanisms  - Assist in developing anxiety-reducing skills  - Administer/offer alternative therapies  - Limit or eliminate stimulants  Outcome: Progressing     Problem: Alteration in Orientation  Goal: Interact with staff daily  Description  Interventions:  - Assess and re-assess patient's level of orientation  - Engage patient in 1 on 1 interactions, daily, for a minimum of 15 minutes   - Establish rapport/trust with patient   Outcome: Progressing     Problem: PAIN - ADULT  Goal: Verbalizes/displays adequate comfort level or baseline comfort level  Description  Interventions:  - Encourage patient to monitor pain and request assistance  - Assess pain using appropriate pain scale  - Administer analgesics based on type and severity of pain and evaluate response  - Implement non-pharmacological measures as appropriate and evaluate response  - Consider cultural and social influences on pain and pain management  - Notify physician/advanced practitioner if interventions unsuccessful or patient reports new pain  Outcome: Progressing     Problem: SAFETY ADULT  Goal: Patient will remain free of falls  Description  INTERVENTIONS:  - Assess patient frequently for physical needs  -  Identify cognitive and physical deficits and behaviors that affect risk of falls  -  [place] fall precautions as indicated by assessment   - Educate patient/family on patient safety including physical limitations  - Instruct patient to call for assistance with activity based on assessment  - Modify environment to reduce risk of injury  - Consider OT/PT consult to assist with strengthening/mobility  Outcome: Progressing     Problem: Alteration in Thoughts and Perception  Goal: Complete daily ADLs, including personal hygiene independently, as able  Description  Interventions:  - Observe, teach, and assist patient with ADLS  - Monitor and promote a balance of rest/activity, with adequate nutrition and elimination   Outcome: Not Progressing     Problem: Risk for Self Injury/Neglect  Goal: Recognize maladaptive responses and adopt new coping mechanisms  Outcome: Not Progressing  Goal: Complete daily ADLs, including personal hygiene independently, as able  Description  Interventions:  - Observe, teach, and assist patient with ADLS  - Monitor and promote a balance of rest/activity, with adequate nutrition and elimination  Outcome: Not Progressing     Problem: Nutrition/Hydration-ADULT  Goal: Nutrient/Hydration intake appropriate for improving, restoring or maintaining nutritional needs  Description  Monitor and assess patient's nutrition/hydration status for malnutrition  Collaborate with interdisciplinary team and initiate plan and interventions as ordered    Monitor patient's weight and dietary intake as ordered or per policy  Utilize nutrition screening tool and intervene as necessary  Determine patient's food preferences and provide high-protein, high-caloric foods as appropriate  INTERVENTIONS:  - Monitor oral intake, urinary output, labs, and treatment plans  - Assess nutrition and hydration status and recommend course of action  - Evaluate amount of meals eaten  - Assist patient with eating if necessary   - Allow adequate time for meals  - Recommend/ encourage appropriate diets, oral nutritional supplements, and vitamin/mineral supplements  - Order, calculate, and assess calorie counts as needed  - Recommend, monitor, and adjust tube feedings and TPN/PPN based on assessed needs  - Assess need for intravenous fluids  - Provide specific nutrition/hydration education as appropriate  - Include patient/family/caregiver in decisions related to nutrition  Outcome: Not Progressing   Mostly isolative to her room, sleeping in her bed  Out for meds and meals and attended IMR group  No delusions voiced but appears wary and suspicious  Ate 100% of breakfast and refused lunch  No other behaviors or issues noted  Continue to monitor

## 2020-06-02 NOTE — TREATMENT TEAM
06/02/20 0900   Activity/Group Checklist   Group Community meeting  (Morning walk/coffee )   Attendance Did not attend   Attendance Duration (min) 31-45   Affect/Mood IRMA

## 2020-06-02 NOTE — PLAN OF CARE
Problem: Alteration in Thoughts and Perception  Goal: Verbalize thoughts and feelings  Description  Interventions:  - Promote a nonjudgmental and trusting relationship with the patient through active listening and therapeutic communication  - Assess patient's level of functioning, behavior and potential for risk  - Engage patient in 1 on 1 interactions for a minimum of 15 minutes each session  - Encourage patient to express fears, feelings, frustrations, and discuss symptoms    - [place] patient to reality, help patient recognize reality-based thinking   - Administer medications as ordered and assess for potential side effects  - Provide the patient education related to the signs and symptoms of the illness and desired effects of prescribed medications  Outcome: Progressing  Goal: Attend and participate in unit activities, including therapeutic, recreational, and educational groups  Description  Interventions:  - Provide therapeutic and educational activities daily, encourage attendance and participation, and document same in the medical record     CERTIFIED PEER SPECIALIST INTERVENTIONS:    Complete peer assessment with patient to assess their needs and identify their goals to complete while in the recovery program as well as once discharged into the community  Patient will complete WRAP Plan, Crisis Plan and 5 Life Domains  Patient will attend 50% of groups offered on the unit  Patient will complete a goal card weekly  Outcome: Progressing     Problem: Ineffective Coping  Goal: Identifies healthy coping skills  Outcome: Progressing  Goal: Participates in unit activities  Description  Interventions:  - Provide therapeutic environment   - Provide required programming   - Redirect inappropriate behaviors   Outcome: Progressing  Writer met with Patient for face to face and continued to work on the completion of her [employer]   Patient identified her triggers as "physical health issues, not being able to do things I want to do, not hearing from family members, feeling hopelessness, alone and depressed " Patient was able to identify a plan for her triggers such as "call upon ACT Team and Peer Supports, let go of expectations of family, find things I can still do, begin making a social life for myself and find a volunteer position for myself to stay busy " Patient was able to recognize early warning signs as staying in bed, sleeping to much, isolating, decrease in appetite, increase in depression  Patient's plan for her warning signs and when things begin to breakdown is to "call my Doctor/Peer Support/ACT Team and reach out to staff at my housing unit " Patient also recognized she becomes a "little paranoid, isolates and becomes irritable" in her thoughts, behaviors and feelings when things begin to decline  Patient was cooperative, pleasant, honest and forthright in creating her WRAP Plan thus far  Writer and patient will complete the WRAP Plan at our next face to face

## 2020-06-02 NOTE — PROGRESS NOTES
Psychiatry Progress Note [address] 60 y o  female MRN: [id number]  Unit/Bed#: [facility] 111-01 Encounter: [id number]  Code Status: Level 1 - Full Code    PCP: Amy Cameron PA-C    Date of Admission:  7/23/2019 1730   Date of Service:  06/02/20  Patient Active Problem List   Diagnosis    COPD with asthma ([facility] [address] )    Tobacco use disorder, continuous    Compression fracture of L4 lumbar vertebra    Ventral hernia    Acute on chronic respiratory failure with hypoxia ([facility] [address] )    Schizoaffective disorder, bipolar type ([facility] [address] )    Acquired hypothyroidism    Gastroesophageal reflux disease without esophagitis    Abnormal CT of the chest    Excessive cerumen in left ear canal    Lipoma of right upper extremity    Noncompliant with deep vein thrombosis (DVT) prophylaxis    At risk for aspiration    Ear ache    Tachycardia    Chest pain    Low HDL (under 40)     Diagnosis schizoaffective bipolar  Assessment    Overall Status: Now complains of voices that tell her people are talking about her, still not consistent in attending to ADLs and still with psychosomatic complaints     Certification Statement to demonstrate a period of stability by attending to ADLs and becoming less psychosomatic in attending more groups Acceptance by patient:  Accepting  [facility] [place] in recovery:  Living at another personal care home   Understanding of medications:  Patient is aware about risks side effects benefits and precautions of medications   Involved in reintegration process:   On hold due to [address] in relationship with psychiatrist:  Trusting sometimes    Medication changes   None today as she refuses to change any meds or add any new meds offered  Non-pharmacological treatments   Waiting for your culture and sensitivity report   Continue with individual, group, milieu and occupational therapy using recovery principles and psycho-education about accepting illness and the need for treatment   Encourage to take showers twice a week and cooperate with treatment and adl skills as per her behav  Plan   Therapeutic passes on hold due to covid 19 lock down   Prepare for the [facility] and encouraged to accept  rather than refuse it   Reminded to attend at least 25% of groups on a daily basis including IMR   Encouraged to start taking the Abilify and the increased Zoloft    Safety   Safety and communication plan established to target dynamic risk factors discussed above  Discharge Plan   · back to a [facility] at [address]   Patient remains passive-aggressive and not attending groups or attending to ADLs or grooming despite promises she would on a daily basis  She still not taking showers as required and continues to insist she is dying and is resistive to attending to ADLs  She does not admit to any overt delusional beliefs but she does present with underlying paranoia and psychosomatic symptoms of hypochondriasis  She understands that she needs to attend at least 25% of groups and also attend to her grooming and taking showers at least twice a week before she can be considered going back to the [facility] where she has already accepted  He still accusing some of the staff of tampering with her medications and oxygen concentrator  She has not taken showers in the last 5 days and has long poorly combed matted un groomed hair and she is aware of the same and has again promised to try to do better  Now complains of voices and feels people talk about her but refuses to add Abilify or make any other changes in her meds, still with stilted speech       Sleep:   Fair  Appetite:   Fair but she picks and chooses her food  Compliance with medications:  Good  Side effects:   none but claims to feel tired sometimes  Review of systems:  Continues to have psychosomatic symptoms as usual  Group attendance:  Poor    Mental Status Exam  Appearance:    Patient did attend team today, poorly groomed with almost matted and uncombed fluffy long hair, Looks older than her stated age,and not very well kept, with uncombed hairt but  wearing clean clothing   Anxious preoccupied  Has good eye contact   Suspicious in her interaction  Behavior:    Superficially friendly pleasant but anxious suspicious and preoccupied  Speech:    tangential at times with tendency to become stilted   Mood:     anxious sad and irritated times,    Affect:    increased in intensity range mood congruent redirectable and her affect was brighter towards the end of the interview  Thought Process:   Circumstantial with tendency to have stilted speech   Thought Content:   Remains paranoid about motives of staff switching her medications or tampering with her inhalant or her oxygen concentrator off and on  No preoccupation with violence or suicide  No current suicidal homicidal thoughts intent or plans reported  No phobias but has obsessions and compulsions about examining her pills before she takes them  Brian Del Angel Psychosomatic about dying by choking from food and from heart attack or from shortness of breath and now from catching Covid-19 which are all ongoing beliefs  She believe she may turn blue and die if she lifts her hands up to apply shampoo on her head  Also worries people talk about her as well  Also believes that people are giving her tainted meds that make her feel tired and sick! Perceptual Disturbances:  Now  hear voices of her ex  that tell her domenica  people are talking about her   Risk Potential:   Ability to care for herself and refusal to take showers  Sensorium:   fully oriented to place, person, time, date, day, month, year and to situation  Cognition:    Grossly intact, aware of current events like the corona virus situation, recent and remote memory intact     No language deficit  Consciousness:   Alert and awake  Attention and concentration:  fair  Intellect:    average  Insight: limited  Judgment:    fair  Motor Activity:  No abnormal involuntary movement noted today    Vitals  Temp:  [97 7 °F (36 5 °C)] 97 7 °F (36 5 °C)  HR:  [85] 85  Resp:  [14] 14  BP: (98)/(58) 98/58  SpO2:  [94 %] 94 %  No intake or output data in the 24 hours ending 06/02/20 0754    Lab Results: No New Labs Available For Today          Current Facility-Administered Medications:  acetaminophen 325 mg Oral Q6H PRN Jennifer Taveras MD   acetaminophen 650 mg Oral Q6H PRN Jennifer Taveras MD   acetaminophen 650 mg Oral Q8H PRN Jennifer Taveras MD   albuterol 2 puff Inhalation Q4H PRN Jennifer Taveras MD   aluminum-magnesium hydroxide-simethicone 15 mL Oral Q4H PRN Jennifer Taveras MD   ammonium lactate 1 application Topical BID PRN Jennifer Taveras MD   benzonatate 100 mg Oral TID PRN Jennifer Taveras MD   benztropine 1 mg Intramuscular Q8H PRN Jennifer Taveras MD   carbamide peroxide 5 drop Left Ear BID PRN Jennifer Taveras MD   cloZAPine 25 mg Oral BID Jennifer Taveras MD   cloZAPine 50 mg Oral BID Jennifer Taveras MD   docusate sodium 100 mg Oral BID PRN Jennifer Taveras MD   EPINEPHrine PF 0 15 mg Intramuscular Once PRN Jennifer Taveras MD   fluticasone-vilanterol 1 puff Inhalation Daily Jennifer Taveras MD   ketotifen 1 drop Right Eye BID PRN Jennifer Taveras MD   levothyroxine 125 mcg Oral Early Morning Jennifer Taveras MD   magnesium hydroxide 30 mL Oral Daily PRN Jennifer Taveras MD   montelukast 10 mg Oral HS Jennifer Taveras MD   OLANZapine 5 mg Intramuscular Q8H PRN Jennifer Taveras MD   OLANZapine 5 mg Oral Q8H PRN Jennifer Taveras MD   ondansetron 4 mg Oral Q6H PRN Jennifer Taveras MD   pantoprazole 40 mg Oral Early Morning Jennifer Taveras MD   polyethylene glycol 17 g Oral Daily PRN Jennifer Taveras MD   polyvinyl alcohol 1 drop Both Eyes Q3H PRN Jennifer Taveras MD   sertraline 200 mg Oral Daily Jennifer Taveras MD   sucralfate 1,000 mg Oral BID Fletcher Nevarez MD   theophylline 200 mg Oral Daily Jennifer Distel, MD   tiotropium 18 mcg Inhalation Daily Jennifer Taveras MD   traZODone 25 mg Oral HS PRN Malvina Severe Lopez Trinidad MD       Counseling / Coordination of Care: Total floor / unit time spent today 15 minutes  Greater than 50% of total time was spent with the patient and / or family counseling and / or somewhat receptive to supportive listening and teaching positive coping skills to deal with symptom mangement  Patient's Rights, confidentiality and exceptions to confidentiality, use of automated medical record, [facility] staff access to medical record, and consent to treatment reviewed

## 2020-06-02 NOTE — PLAN OF CARE
Problem: DISCHARGE PLANNING  Goal: Discharge to home or other facility with appropriate resources  Description  INTERVENTIONS:  - Conduct assessment to determine patient/family and health care team treatment goals, and need for post-acute services based on payer coverage, community resources, and patient preferences, and barriers to discharge  - Address psychosocial, clinical, and financial barriers to discharge as identified in assessment in conjunction with the patient/family and health care team  - Assist the patient in reintegration back into the community by removing barriers which may hinder a successful discharge once deemed stable  - Arrange appropriate level of post-acute services according to patient's needs and preference and payer coverage in collaboration with the physician and health care team  - Communicate with and update the patient/family, physician, and health care team regarding progress on the discharge plan  - Arrange appropriate transportation to post-acute venues    Outcome: Progressing     D/C date: TBD  D/C plan: [facility] when bed available

## 2020-06-02 NOTE — PROGRESS NOTES
The individual struggled during treatment team this morning  She was expressing paranoid thoughts, fears someone is tampering with medications or oxygen humidifier  When attempting to discuss recovery and preparing for discharge, she offers excuses for behaviors and takes no self responsibility  Appetite remains poor  Attends select groups  Will continue to monitor

## 2020-06-02 NOTE — PROGRESS NOTES
06/02/20 1509   Team Meeting   Meeting Type Tx Team Meeting   Team Members Present   Team Members Present Physician;;;Nurse; Other (Discipline and Name)   Physician Team Member Dr Jackie Hernadez Team Member Jesus Angel RN   Care Management Team Member ASHLYN Gan   Social Work Team Member Karissa Carrion LCSW   Other (Discipline and Name) Nedra Box, [facility]; Massiel Javier, [facility] [address]   Patient/Family Present   Patient Present Yes   Patient's Family Present No     Patient attended treatment team meeting this morning prepared without self-assessment  Patient's   group attendance for last week was 13%  Team and patient completed risk assessment and the patient did not verbalize any desire to elope from the program  Patient verbalized understanding of consequences of eloping from treatment while on a commitment  Patient verbalized no further questions or concerns at the conclusion of the meeting  Next team meeting scheduled for 6/9/2020  Patient reports that she has been hearing voices of her ex  in her room  Patient educated by [facility] that there are two options, [facility] vs [facility], as she has not been as recovery focused  Patient did not agree and continued to make excuse for anything said to her  Patient explained by Massiel Javier that she needed to start doing when she needed so that when the [facility] is able to take her, she is ready  Patient verbalized understanding  Patient does not believe she needs all medications prescribed, patient educated by MD  Patient agreed to a rep-payee but only her sister  CM to reach out and find out if this is an option

## 2020-06-02 NOTE — NURSING NOTE
Maintained on fall and safe precautions   O2 with humidification @ 1 L/M via nasal cannula  In no distress  Appears to be sleeping, eyes closed breath sounds noted  Monitoring continues

## 2020-06-02 NOTE — PLAN OF CARE
Problem: Alteration in Thoughts and Perception  Goal: Verbalize thoughts and feelings  Description  Interventions:  - Promote a nonjudgmental and trusting relationship with the patient through active listening and therapeutic communication  - Assess patient's level of functioning, behavior and potential for risk  - Engage patient in 1 on 1 interactions for a minimum of 15 minutes each session  - Encourage patient to express fears, feelings, frustrations, and discuss symptoms    - [place] patient to reality, help patient recognize reality-based thinking   - Administer medications as ordered and assess for potential side effects  - Provide the patient education related to the signs and symptoms of the illness and desired effects of prescribed medications  Outcome: Progressing     Problem: Alteration in Orientation  Goal: Interact with staff daily  Description  Interventions:  - Assess and re-assess patient's level of orientation  - Engage patient in 1 on 1 interactions, daily, for a minimum of 15 minutes   - Establish rapport/trust with patient   Outcome: Progressing     Problem: SAFETY ADULT  Goal: Patient will remain free of falls  Description  INTERVENTIONS:  - Assess patient frequently for physical needs  -  Identify cognitive and physical deficits and behaviors that affect risk of falls    -  [place] fall precautions as indicated by assessment   - Educate patient/family on patient safety including physical limitations  - Instruct patient to call for assistance with activity based on assessment  - Modify environment to reduce risk of injury  - Consider OT/PT consult to assist with strengthening/mobility  Outcome: Progressing     Problem: Alteration in Thoughts and Perception  Goal: Attend and participate in unit activities, including therapeutic, recreational, and educational groups  Description  Interventions:  - Provide therapeutic and educational activities daily, encourage attendance and participation, and document same in the medical record     CERTIFIED PEER SPECIALIST INTERVENTIONS:    Complete peer assessment with patient to assess their needs and identify their goals to complete while in the recovery program as well as once discharged into the community  Patient will complete WRAP Plan, Crisis Plan and 5 Life Domains  Patient will attend 50% of groups offered on the unit  Patient will complete a goal card weekly  Outcome: Not Progressing  Goal: Complete daily ADLs, including personal hygiene independently, as able  Description  Interventions:  - Observe, teach, and assist patient with ADLS  - Monitor and promote a balance of rest/activity, with adequate nutrition and elimination   Outcome: Not Progressing     Problem: Depression  Goal: Refrain from isolation  Description  Interventions:  - Develop a trusting relationship   - Encourage socialization   Outcome: Not Progressing  Goal: Refrain from self-neglect  Outcome: Not Laina Dawn has been isolative to her room a majority of the morning  Only came out for medication and breakfast  Took pills without swallowing difficulty and used inhalers as scheduled  Ate 50% of breakfast and then went to her room  Has not been somatic  No complaints thus far  Did not attend groups this AM  Had Treatment team  Disheveled appearance  Able to make needs known to staff  Continue to monitor  Precautions maintained

## 2020-06-02 NOTE — TREATMENT TEAM
06/02/20 1100   Activity/Group Checklist   Group   (IMR/Graduation/Elements of Recovery )   Attendance Attended   Attendance Duration (min) 46-60   Interactions Interacted appropriately   Affect/Mood Appropriate; Constricted   Goals Achieved Identified feelings; Able to listen to others; Able to engage in interactions

## 2020-06-02 NOTE — PROGRESS NOTES
06/02/20 0906   Team Meeting   Meeting Type Daily Rounds   Team Members Present   Team Members Present Physician;;Nurse;; Other (Discipline and Name)   Physician Team Member Bari Read Management Team Member Crecencio Meigs, MSW   Social Work Team Member Emerson [facility] Hospital[facility]ANCIS   Other (Discipline and Name) Bharathi Ramirez, CPS     No changes  Disheveled

## 2020-06-02 NOTE — PROGRESS NOTES
[address] [facility] was unwilling to do the Incentive Spirometry this evening, "No, honey " She did not attend PM Group  Came out for HS snack & HS medicine except the Singulair  Wearing now her QHS humidified nasal O2 @ 1L for bed

## 2020-06-02 NOTE — TREATMENT TEAM
06/02/20 1400   Activity/Group Checklist   Group   (Recovery Workshop )   Attendance Attended   Attendance Duration (min) 46-60   Interactions Interacted appropriately   Affect/Mood Appropriate;Bright;Normal range   Goals Achieved Identified feelings; Able to listen to others; Able to engage in interactions

## 2020-06-03 NOTE — TREATMENT TEAM
06/03/20 1100   Activity/Group Checklist   Group   (IMR/Recovery Anonymous )   Attendance Did not attend   Attendance Duration (min) 46-60   Affect/Mood IRMA

## 2020-06-03 NOTE — PLAN OF CARE
Problem: Alteration in Thoughts and Perception  Goal: Verbalize thoughts and feelings  Description  Interventions:  - Promote a nonjudgmental and trusting relationship with the patient through active listening and therapeutic communication  - Assess patient's level of functioning, behavior and potential for risk  - Engage patient in 1 on 1 interactions for a minimum of 15 minutes each session  - Encourage patient to express fears, feelings, frustrations, and discuss symptoms    - [place] patient to reality, help patient recognize reality-based thinking   - Administer medications as ordered and assess for potential side effects  - Provide the patient education related to the signs and symptoms of the illness and desired effects of prescribed medications  Outcome: Progressing  Goal: Agree to be compliant with medication regime, as prescribed and report medication side effects  Description  Interventions:  - Offer appropriate PRN medication and supervise ingestion; conduct aims, as needed   Outcome: Progressing     Problem: Alteration in Thoughts and Perception  Goal: Attend and participate in unit activities, including therapeutic, recreational, and educational groups  Description  Interventions:  - Provide therapeutic and educational activities daily, encourage attendance and participation, and document same in the medical record     CERTIFIED PEER SPECIALIST INTERVENTIONS:    Complete peer assessment with patient to assess their needs and identify their goals to complete while in the recovery program as well as once discharged into the community  Patient will complete WRAP Plan, Crisis Plan and 5 Life Domains  Patient will attend 50% of groups offered on the unit  Patient will complete a goal card weekly      Outcome: Not Progressing  Goal: Complete daily ADLs, including personal hygiene independently, as able  Description  Interventions:  - Observe, teach, and assist patient with ADLS  - Monitor and promote a balance of rest/activity, with adequate nutrition and elimination   Outcome: Not Progressing     [address] has come out of her room to sit in phone chair when it was not in use  Asked this nurse to call the pharmacy to find out if tuna fish & Ensure may be producing an "adverse reaction" because she gets a bad aftertaste when eats the two  Reality presented that some foods just don't combine well from taste standpoint, but, this does not indicate adverse reaction  She ate poorly @ meal, 10%, only bites macaroni & cheese, did have an Ensure  Did come up on own for supper medicine  Placed a phone call to family & bright during that chat  Asleep in bed since  Continues unkempt, same uncombed matted hair, no shower in 5 days & no initiative to attend to hygiene  Has not responded to invitations to evening programming options

## 2020-06-03 NOTE — PLAN OF CARE
Problem: Individualized Interventions  Goal: Attend and participate in unit activities, including therapeutic, recreational, and educational groups  Description    PSYCHOTHERAPY INTERVENTIONS:    -Form therapeutic alliance to promote trust and safety within a therapeutic environment    -Engage in individual psychotherapy using evidence-based practices that are specific to individual needs   -Process barriers to community living with an emphasis on solution-based interventions   -Promote self-awareness and identity development   -Identify and process patterns of behavior that have led to decompensation and/or hospitalizations   -Attend psychoeducation groups with licensed psychotherapist to learn about Illness Management Recovery (IMR) concepts and enhance coping skills    -Practice effective communication with staff, peers, family, and community members  Participate in family sessions as necessary   -Encourage reality-based thought content by examining thought processes and cognitive distortions      -Work with treatment team in reintegration back into the community when appropriate  Outcome: Progressing    Therapist attempted to engage patient in an individual session after treatment team, to discuss ways in which patient can improve her potential for discharge to [facility] [facility]  Patient reported that she did not wish to speak about the subject and wanted to rest in her bed, where she was curled up with a blanket  Therapist will continue to try to help patient gain insight into how her somatic behaviors impact her overall well-being

## 2020-06-03 NOTE — PROGRESS NOTES
[address] performed her Incentive Spirometry achieving 1250ml volume for 8 of 10 breaths  She had an HS snack of juice & ice cream, took HS medicine except the Singulair  Wearing now her QHS humidified nasal O2 @ 1L for bed

## 2020-06-03 NOTE — PROGRESS NOTES
Maggie maintained on ongoing fall and SAFE precaution  Zev Parsons in bed with eyes closed, breath even and unlabored   On O2 with humidifier @1L/m via nasal cannula  Continues rounding implemented   No somatic complaint overnight  No PRN needed for sleep aid   No indication of pain or discomfort   Will continue to monitor

## 2020-06-03 NOTE — TREATMENT TEAM
Declined      06/03/20 0915   Activity/Group Checklist   Group Community meeting  (Morning walk/Coffee )   Attendance Did not attend   Attendance Duration (min) 31-45   Affect/Mood IRMA

## 2020-06-03 NOTE — PROGRESS NOTES
06/03/20 1000   Team Meeting   Meeting Type Daily Rounds   Team Members Present   Team Members Present Physician;Nurse; Other (Discipline and Name)   Physician Team Member Dr Pia Edwards Team Member Roevrto Gonzalez, RN   Other (Discipline and Name) JER RobersonW; Delbert Fletcher, SHANIKA     Disheveled, paranoid, poor accountability  Poor appetite

## 2020-06-03 NOTE — PROGRESS NOTES
Psychiatry Progress Note [address] 60 y o  female MRN: [id number]  Unit/Bed#: [facility]ARCIO ADA[facility] 111-01 Encounter: [id number]  Code Status: Level 1 - Full Code    PCP: Candance Gamble, PA-C    Date of Admission:  7/23/2019 2397   Date of Service:  06/03/20  Patient Active Problem List   Diagnosis    COPD with asthma ([facility] [address] )    Tobacco use disorder, continuous    Compression fracture of L4 lumbar vertebra    Ventral hernia    Acute on chronic respiratory failure with hypoxia ([facility] [address] )    Schizoaffective disorder, bipolar type ([facility] [address] )    Acquired hypothyroidism    Gastroesophageal reflux disease without esophagitis    Abnormal CT of the chest    Excessive cerumen in left ear canal    Lipoma of right upper extremity    Noncompliant with deep vein thrombosis (DVT) prophylaxis    At risk for aspiration    Ear ache    Tachycardia    Chest pain    Low HDL (under 40)     Diagnosis schizoaffective bipolar  Assessment    Overall Status:  Now believes people are wearing ear pieces and talking about her and still with psychosomatic symptoms but attending more groups and no showers in the last 5 days    Certification Statement to demonstrate a period of stability by attending to ADLs and becoming less psychosomatic in attending more groups Acceptance by patient:  Accepting  Letty Henry in recovery:  Living at another personal care home   Understanding of medications:  Patient is aware about risks side effects benefits and precautions of medications   Involved in reintegration process:   On hold due to [address] in relationship with psychiatrist:  Trusting sometimes    Medication changes   None today as she refuses to change any meds or add any new meds offered  Non-pharmacological treatments   Waiting for your culture and sensitivity report   Continue with individual, group, milieu and occupational therapy using recovery principles and psycho-education about accepting illness and the need for treatment   Encourage to take showers twice a week and cooperate with treatment and adl skills as per her behav  Plan   Therapeutic passes on hold due to covid 19 lock down   Prepare for the [place] [facility] and encouraged to accept  rather than refuse it   Reminded to attend at least 25% of groups on a daily basis including IMR   Encouraged to start taking the Abilify and the increased Zoloft    Safety   Safety and communication plan established to target dynamic risk factors discussed above  Discharge Plan   · back to a [facility] at [address]   Patient is still somewhat passive-aggressive but has been attending more groups and waiting to take a shower probably this evening  Still paranoid about people talking about her at times and still psychosomatic about dying from choking or from heart attack or from catching COVID-19 or from other diseases but seems to be responding to support and reassurance and listening patiently to her complaints  Grooming is still not that great  She understands that she needs to work on moving into [employer] personal care home where she was already accepted once the COVID-19 restrictions are lifted    Sleep:   Fair  Appetite:   Fair but she picks and chooses her food  Compliance with medications:  Good  Side effects:   none but claims to feel tired sometimes  Review of systems:  Continues to have psychosomatic symptoms as usual  Group attendance:  Poor    Mental Status Exam  Appearance:    Patient did attend team today, poorly groomed with almost matted and uncombed fluffy long hair, Looks older than her stated age,and not very well kept, with uncombed hairt but  wearing clean clothing   Anxious preoccupied  Has good eye contact   Suspicious in her interaction      Behavior:    Superficially friendly pleasant but anxious suspicious and preoccupied  Speech:    tangential at times with tendency to become stilted   Mood:     anxious sad and irritated times,    Affect:    increased in intensity range mood congruent redirectable and her affect was brighter towards the end of the interview  Thought Process:   Circumstantial with tendency to have stilted speech   Thought Content:   Remains paranoid about motives of staff switching her medications or tampering with her inhalant or her oxygen concentrator off and on  No preoccupation with violence or suicide  No current suicidal homicidal thoughts intent or plans reported  No phobias but has obsessions and compulsions about examining her pills before she takes them  Goran Elmore Psychosomatic about dying by choking from food and from heart attack or from shortness of breath and now from catching Covid-19 which are all ongoing beliefs  She believe she may turn blue and die if she lifts her hands up to apply shampoo on her head  Also worries people talk about her as well  Also believes that people are giving her tainted meds that make her feel tired and sick! Perceptual Disturbances:  Now  hear voices of her ex  that tell her [place]  people are talking about her   Risk Potential:   Ability to care for herself and refusal to take showers  Sensorium:   fully oriented to place, person, time, date, day, month, year and to situation  Cognition:    Grossly intact, aware of current events like the corona virus situation, recent and remote memory intact     No language deficit  Consciousness:   Alert and awake  Attention and concentration:  fair  Intellect:    average  Insight:    limited  Judgment:    fair  Motor Activity:  No abnormal involuntary movement noted today    Vitals  Temp:  [97 3 °F (36 3 °C)-97 5 °F (36 4 °C)] 97 3 °F (36 3 °C)  HR:  [] 132  Resp:  [14-18] 18  BP: (110)/(70-74) 110/70  SpO2:  [93 %-95 %] 95 %  No intake or output data in the 24 hours ending 06/03/20 1046    Lab Results: No New Labs Available For Today          Current Facility-Administered Medications:  acetaminophen 325 mg Oral Q6H PRN [facility], MD   acetaminophen 650 mg Oral Q6H PRN [facility], MD   acetaminophen 650 mg Oral Q8H PRN [facility], MD   albuterol 2 puff Inhalation Q4H PRN [facility], MD   aluminum-magnesium hydroxide-simethicone 15 mL Oral Q4H PRN [facility], MD   ammonium lactate 1 application Topical BID PRN [facility], MD   benzonatate 100 mg Oral TID PRN [facility], MD   benztropine 1 mg Intramuscular Q8H PRN [facility], MD   carbamide peroxide 5 drop Left Ear BID PRN [facility], MD   cloZAPine 25 mg Oral BID [facility], MD   cloZAPine 50 mg Oral BID [facility], MD   docusate sodium 100 mg Oral BID PRN [facility], MD   EPINEPHrine PF 0 15 mg Intramuscular Once PRN [facility], MD   fluticasone-vilanterol 1 puff Inhalation Daily [facility], MD   ketotifen 1 drop Right Eye BID PRN [facility], MD   levothyroxine 125 mcg Oral Early Morning [facility], MD   magnesium hydroxide 30 mL Oral Daily PRN [facility], MD   montelukast 10 mg Oral HS [facility], MD   OLANZapine 5 mg Intramuscular Q8H PRN [facility], MD   OLANZapine 5 mg Oral Q8H PRN [facility], MD   ondansetron 4 mg Oral Q6H PRN [facility], MD   pantoprazole 40 mg Oral Early Morning [facility], MD   polyethylene glycol 17 g Oral Daily PRN [facility], MD   polyvinyl alcohol 1 drop Both Eyes Q3H PRN [facility], MD   sertraline 200 mg Oral Daily [facility], MD   sucralfate 1,000 mg Oral BID Janece Hashimoto, MD   theophylline 200 mg Oral Daily [facility], MD   tiotropium 18 mcg Inhalation Daily [facility], MD   traZODone 25 mg Oral HS PRN [facility], MD       Counseling / Coordination of Care: Total floor / unit time spent today 15 minutes  Greater than 50% of total time was spent with the patient and / or family counseling and / or somewhat receptive to supportive listening and teaching positive coping skills to deal with symptom mangement       Patient's Rights, confidentiality and exceptions to confidentiality, use of automated medical record, Behavioral [facility] Services staff access to medical record, and consent to treatment reviewed

## 2020-06-03 NOTE — PLAN OF CARE
Problem: Alteration in Thoughts and Perception  Goal: Verbalize thoughts and feelings  Description  Interventions:  - Promote a nonjudgmental and trusting relationship with the patient through active listening and therapeutic communication  - Assess patient's level of functioning, behavior and potential for risk  - Engage patient in 1 on 1 interactions for a minimum of 15 minutes each session  - Encourage patient to express fears, feelings, frustrations, and discuss symptoms    - [place] patient to reality, help patient recognize reality-based thinking   - Administer medications as ordered and assess for potential side effects  - Provide the patient education related to the signs and symptoms of the illness and desired effects of prescribed medications  Outcome: Progressing  Goal: Agree to be compliant with medication regime, as prescribed and report medication side effects  Description  Interventions:  - Offer appropriate PRN medication and supervise ingestion; conduct aims, as needed   Outcome: Progressing     Problem: Ineffective Coping  Goal: Patient/Family verbalizes awareness of resources  Outcome: Progressing  Goal: Understands least restrictive measures  Description  Interventions:  - Utilize least restrictive behavior  Outcome: Progressing     Problem: Risk for Self Injury/Neglect  Goal: Treatment Goal: Remain safe during length of stay, learn and adopt new coping skills, and be free of self-injurious ideation, impulses and acts at the time of discharge  Outcome: Progressing  Goal: Verbalize thoughts and feelings  Description  Interventions:  - Assess and re-assess patient's lethality and potential for self-injury  - Engage patient in 1:1 interactions, daily, for a minimum of 15 minutes  - Encourage patient to express feelings, fears, frustrations, hopes  - Establish rapport/trust with patient   Outcome: Progressing  Goal: Refrain from harming self  Description  Interventions:  - Monitor patient closely, per order  - Develop a trusting relationship  - Supervise medication ingestion, monitor effects and side effects   Outcome: Progressing     Problem: Depression  Goal: Verbalize thoughts and feelings  Description  Interventions:  - Assess and re-assess patient's level of risk   - Engage patient in 1:1 interactions, daily, for a minimum of 15 minutes   - Encourage patient to express feelings, fears, frustrations, hopes   Outcome: Progressing     Problem: Anxiety  Goal: Anxiety is at manageable level  Description  Interventions:  - Assess and monitor patient's anxiety level  - Monitor for signs and symptoms of anxiety both physical and emotional (heart palpitations, chest pain, shortness of breath, headaches, nausea, feeling jumpy, restlessness, irritable, apprehensive)  - Collaborate with interdisciplinary team and initiate plan and interventions as ordered    - [place] patient to unit/surroundings  - Explain treatment plan  - Encourage participation in care  - Encourage verbalization of concerns/fears  - Identify coping mechanisms  - Assist in developing anxiety-reducing skills  - Administer/offer alternative therapies  - Limit or eliminate stimulants  Outcome: Progressing     Problem: Alteration in Orientation  Goal: Interact with staff daily  Description  Interventions:  - Assess and re-assess patient's level of orientation  - Engage patient in 1 on 1 interactions, daily, for a minimum of 15 minutes   - Establish rapport/trust with patient   Outcome: Progressing     Problem: PAIN - ADULT  Goal: Verbalizes/displays adequate comfort level or baseline comfort level  Description  Interventions:  - Encourage patient to monitor pain and request assistance  - Assess pain using appropriate pain scale  - Administer analgesics based on type and severity of pain and evaluate response  - Implement non-pharmacological measures as appropriate and evaluate response  - Consider cultural and social influences on pain and pain management  - Notify physician/advanced practitioner if interventions unsuccessful or patient reports new pain  Outcome: Progressing     Problem: SAFETY ADULT  Goal: Patient will remain free of falls  Description  INTERVENTIONS:  - Assess patient frequently for physical needs  -  Identify cognitive and physical deficits and behaviors that affect risk of falls  -  [place] fall precautions as indicated by assessment   - Educate patient/family on patient safety including physical limitations  - Instruct patient to call for assistance with activity based on assessment  - Modify environment to reduce risk of injury  - Consider OT/PT consult to assist with strengthening/mobility  Outcome: Progressing     Problem: Alteration in Thoughts and Perception  Goal: Treatment Goal: Gain control of psychotic behaviors/thinking, reduce/eliminate presenting symptoms and demonstrate improved reality functioning upon discharge  Outcome: Not Progressing  Goal: Attend and participate in unit activities, including therapeutic, recreational, and educational groups  Description  Interventions:  - Provide therapeutic and educational activities daily, encourage attendance and participation, and document same in the medical record     CERTIFIED PEER SPECIALIST INTERVENTIONS:    Complete peer assessment with patient to assess their needs and identify their goals to complete while in the recovery program as well as once discharged into the community  Patient will complete WRAP Plan, Crisis Plan and [employer]  Patient will attend 50% of groups offered on the unit  Patient will complete a goal card weekly      Outcome: Not Progressing  Goal: Recognize dysfunctional thoughts, communicate reality-based thoughts at the time of discharge  Description  Interventions:  - Provide medication and psycho-education to assist patient in compliance and developing insight into his/her illness   Outcome: Not Progressing  Goal: Complete daily ADLs, including personal hygiene independently, as able  Description  Interventions:  - Observe, teach, and assist patient with ADLS  - Monitor and promote a balance of rest/activity, with adequate nutrition and elimination   Outcome: Not Progressing     Problem: Ineffective Coping  Goal: Participates in unit activities  Description  Interventions:  - Provide therapeutic environment   - Provide required programming   - Redirect inappropriate behaviors   Outcome: Not Progressing     Problem: Risk for Self Injury/Neglect  Goal: Attend and participate in unit activities, including therapeutic, recreational, and educational groups  Description  Interventions:  - Provide therapeutic and educational activities daily, encourage attendance and participation, and document same in the medical record  - Obtain collateral information, encourage visitation and family involvement in care   Outcome: Not Progressing  Goal: Recognize maladaptive responses and adopt new coping mechanisms  Outcome: Not Progressing  Goal: Complete daily ADLs, including personal hygiene independently, as able  Description  Interventions:  - Observe, teach, and assist patient with ADLS  - Monitor and promote a balance of rest/activity, with adequate nutrition and elimination  Outcome: Not Progressing     Problem: Depression  Goal: Treatment Goal: Demonstrate behavioral control of depressive symptoms, verbalize feelings of improved mood/affect, and adopt new coping skills prior to discharge  Outcome: Not Progressing  Goal: Refrain from isolation  Description  Interventions:  - Develop a trusting relationship   - Encourage socialization   Outcome: Not Progressing  Goal: Refrain from self-neglect  Outcome: Not Progressing     Problem: Nutrition/Hydration-ADULT  Goal: Nutrient/Hydration intake appropriate for improving, restoring or maintaining nutritional needs  Description  Monitor and assess patient's nutrition/hydration status for malnutrition  Collaborate with interdisciplinary team and initiate plan and interventions as ordered  Monitor patient's weight and dietary intake as ordered or per policy  Utilize nutrition screening tool and intervene as necessary  Determine patient's food preferences and provide high-protein, high-caloric foods as appropriate  INTERVENTIONS:  - Monitor oral intake, urinary output, labs, and treatment plans  - Assess nutrition and hydration status and recommend course of action  - Evaluate amount of meals eaten  - Assist patient with eating if necessary   - Allow adequate time for meals  - Recommend/ encourage appropriate diets, oral nutritional supplements, and vitamin/mineral supplements  - Order, calculate, and assess calorie counts as needed  - Recommend, monitor, and adjust tube feedings and TPN/PPN based on assessed needs  - Assess need for intravenous fluids  - Provide specific nutrition/hydration education as appropriate  - Include patient/family/caregiver in decisions related to nutrition  Outcome: Not Progressing   Mostly isolative to her room, sleeping in her bed  Out for meds and meals, did not attend any of the offered groups  Ate 50% of breakfast and 25% of lunch  Expressed concerns about her health, blood pressure, and heart rate  Educated patient and encouraged her to drink more water and be more active; neither of which she did  No other behaviors or issues noted  Continue to monitor

## 2020-06-04 NOTE — PLAN OF CARE
Problem: Alteration in Thoughts and Perception  Goal: Verbalize thoughts and feelings  Description  Interventions:  - Promote a nonjudgmental and trusting relationship with the patient through active listening and therapeutic communication  - Assess patient's level of functioning, behavior and potential for risk  - Engage patient in 1 on 1 interactions for a minimum of 15 minutes each session  - Encourage patient to express fears, feelings, frustrations, and discuss symptoms    - [place] patient to reality, help patient recognize reality-based thinking   - Administer medications as ordered and assess for potential side effects  - Provide the patient education related to the signs and symptoms of the illness and desired effects of prescribed medications  Outcome: Progressing  Goal: Agree to be compliant with medication regime, as prescribed and report medication side effects  Description  Interventions:  - Offer appropriate PRN medication and supervise ingestion; conduct aims, as needed   Outcome: Progressing  Goal: Attend and participate in unit activities, including therapeutic, recreational, and educational groups  Description  Interventions:  - Provide therapeutic and educational activities daily, encourage attendance and participation, and document same in the medical record     CERTIFIED PEER SPECIALIST INTERVENTIONS:    Complete peer assessment with patient to assess their needs and identify their goals to complete while in the recovery program as well as once discharged into the community  Patient will complete WRAP Plan, Crisis Plan and 5 Life Domains  Patient will attend 50% of groups offered on the unit  Patient will complete a goal card weekly      Outcome: Progressing     Problem: Ineffective Coping  Goal: Participates in unit activities  Description  Interventions:  - Provide therapeutic environment   - Provide required programming   - Redirect inappropriate behaviors   Outcome: Progressing  Goal: Patient/Family verbalizes awareness of resources  Outcome: Progressing  Goal: Understands least restrictive measures  Description  Interventions:  - Utilize least restrictive behavior  Outcome: Progressing     Problem: Risk for Self Injury/Neglect  Goal: Treatment Goal: Remain safe during length of stay, learn and adopt new coping skills, and be free of self-injurious ideation, impulses and acts at the time of discharge  Outcome: Progressing  Goal: Verbalize thoughts and feelings  Description  Interventions:  - Assess and re-assess patient's lethality and potential for self-injury  - Engage patient in 1:1 interactions, daily, for a minimum of 15 minutes  - Encourage patient to express feelings, fears, frustrations, hopes  - Establish rapport/trust with patient   Outcome: Progressing  Goal: Refrain from harming self  Description  Interventions:  - Monitor patient closely, per order  - Develop a trusting relationship  - Supervise medication ingestion, monitor effects and side effects   Outcome: Progressing  Goal: Attend and participate in unit activities, including therapeutic, recreational, and educational groups  Description  Interventions:  - Provide therapeutic and educational activities daily, encourage attendance and participation, and document same in the medical record  - Obtain collateral information, encourage visitation and family involvement in care   Outcome: Progressing     Problem: Depression  Goal: Treatment Goal: Demonstrate behavioral control of depressive symptoms, verbalize feelings of improved mood/affect, and adopt new coping skills prior to discharge  Outcome: Progressing  Goal: Verbalize thoughts and feelings  Description  Interventions:  - Assess and re-assess patient's level of risk   - Engage patient in 1:1 interactions, daily, for a minimum of 15 minutes   - Encourage patient to express feelings, fears, frustrations, hopes   Outcome: Progressing  Goal: Refrain from isolation  Description  Interventions:  - Develop a trusting relationship   - Encourage socialization   Outcome: Progressing     Problem: Anxiety  Goal: Anxiety is at manageable level  Description  Interventions:  - Assess and monitor patient's anxiety level  - Monitor for signs and symptoms of anxiety both physical and emotional (heart palpitations, chest pain, shortness of breath, headaches, nausea, feeling jumpy, restlessness, irritable, apprehensive)  - Collaborate with interdisciplinary team and initiate plan and interventions as ordered    - [place] patient to unit/surroundings  - Explain treatment plan  - Encourage participation in care  - Encourage verbalization of concerns/fears  - Identify coping mechanisms  - Assist in developing anxiety-reducing skills  - Administer/offer alternative therapies  - Limit or eliminate stimulants  Outcome: Progressing     Problem: Alteration in Orientation  Goal: Interact with staff daily  Description  Interventions:  - Assess and re-assess patient's level of orientation  - Engage patient in 1 on 1 interactions, daily, for a minimum of 15 minutes   - Establish rapport/trust with patient   Outcome: Progressing     Problem: PAIN - ADULT  Goal: Verbalizes/displays adequate comfort level or baseline comfort level  Description  Interventions:  - Encourage patient to monitor pain and request assistance  - Assess pain using appropriate pain scale  - Administer analgesics based on type and severity of pain and evaluate response  - Implement non-pharmacological measures as appropriate and evaluate response  - Consider cultural and social influences on pain and pain management  - Notify physician/advanced practitioner if interventions unsuccessful or patient reports new pain  Outcome: Progressing     Problem: SAFETY ADULT  Goal: Patient will remain free of falls  Description  INTERVENTIONS:  - Assess patient frequently for physical needs  -  Identify cognitive and physical deficits and behaviors that affect risk of falls  -  [place] fall precautions as indicated by assessment   - Educate patient/family on patient safety including physical limitations  - Instruct patient to call for assistance with activity based on assessment  - Modify environment to reduce risk of injury  - Consider OT/PT consult to assist with strengthening/mobility  Outcome: Progressing     Problem: Alteration in Thoughts and Perception  Goal: Treatment Goal: Gain control of psychotic behaviors/thinking, reduce/eliminate presenting symptoms and demonstrate improved reality functioning upon discharge  Outcome: Not Progressing  Goal: Recognize dysfunctional thoughts, communicate reality-based thoughts at the time of discharge  Description  Interventions:  - Provide medication and psycho-education to assist patient in compliance and developing insight into his/her illness   Outcome: Not Progressing  Goal: Complete daily ADLs, including personal hygiene independently, as able  Description  Interventions:  - Observe, teach, and assist patient with ADLS  - Monitor and promote a balance of rest/activity, with adequate nutrition and elimination   Outcome: Not Progressing     Problem: Risk for Self Injury/Neglect  Goal: Recognize maladaptive responses and adopt new coping mechanisms  Outcome: Not Progressing  Goal: Complete daily ADLs, including personal hygiene independently, as able  Description  Interventions:  - Observe, teach, and assist patient with ADLS  - Monitor and promote a balance of rest/activity, with adequate nutrition and elimination  Outcome: Not Progressing     Problem: Depression  Goal: Refrain from self-neglect  Outcome: Not Progressing     Problem: Nutrition/Hydration-ADULT  Goal: Nutrient/Hydration intake appropriate for improving, restoring or maintaining nutritional needs  Description  Monitor and assess patient's nutrition/hydration status for malnutrition   Collaborate with interdisciplinary team and initiate plan and interventions as ordered  Monitor patient's weight and dietary intake as ordered or per policy  Utilize nutrition screening tool and intervene as necessary  Determine patient's food preferences and provide high-protein, high-caloric foods as appropriate  INTERVENTIONS:  - Monitor oral intake, urinary output, labs, and treatment plans  - Assess nutrition and hydration status and recommend course of action  - Evaluate amount of meals eaten  - Assist patient with eating if necessary   - Allow adequate time for meals  - Recommend/ encourage appropriate diets, oral nutritional supplements, and vitamin/mineral supplements  - Order, calculate, and assess calorie counts as needed  - Recommend, monitor, and adjust tube feedings and TPN/PPN based on assessed needs  - Assess need for intravenous fluids  - Provide specific nutrition/hydration education as appropriate  - Include patient/family/caregiver in decisions related to nutrition  Outcome: Not Progressing   Mostly isolative to her room, sleeping in her bed  Out for meds and meals, attended [facility] group  Ate 25% of breakfast and 0% of lunch  Continues to be paranoid and suspicious, especially during morning medication administration  "No Abilify, right?" Reminded her that it had been discontinued last week  Replied "Oh, ok" but then carefully examined the pills, questioning if they are the right ones  Reassured her that they are the same as what she has been taking every morning  She then took  No other behaviors or issues noted  Continue to monitor

## 2020-06-04 NOTE — TREATMENT TEAM
06/04/20 0900   Activity/Group Checklist   Group Community meeting   Attendance Did not attend   Attendance Duration (min) 31-45   Affect/Mood IRMA

## 2020-06-04 NOTE — PROGRESS NOTES
Psychiatry Progress Note [address] 60 y o  female MRN: [id number]  Unit/Bed#: [facility] 326-03 Encounter: [id number]  Code Status: Level 1 - Full Code    PCP: Inderjit Sánchez PA-C    Date of Admission:  7/23/2019 2086   Date of Service:  06/04/20  Patient Active Problem List   Diagnosis    COPD with asthma ([address] )    Tobacco use disorder, continuous    Compression fracture of L4 lumbar vertebra    Ventral hernia    Acute on chronic respiratory failure with hypoxia ([address] )    [facility]ive disorder, [facility] type [facility]    Acquired hypothyroidism    Gastroesophageal reflux disease without esophagitis    Abnormal CT of the chest    Excessive cerumen in left ear canal    Lipoma of right upper extremity    Noncompliant with deep vein thrombosis (DVT) prophylaxis    At risk for aspiration    Ear ache    Tachycardia    Chest pain    Low HDL (under 40)     Diagnosis schizoaffective bipolar  Assessment    Overall Status:  No shower in the last 6 days and knows shampoo in over a month and still poorly groomed unkept attending only a few groups despite promises she would and still psychosomatic with fear of dying multiple somatic complaints    Certification Statement to demonstrate a period of stability by attending to ADLs and becoming less psychosomatic in attending more groups Acceptance by patient:  Accepting  Yola Ornelas in recovery:  Living at another personal care home   Understanding of medications:  Patient is aware about risks side effects benefits and precautions of medications   Involved in reintegration process: On hold due to [address] in relationship with psychiatrist:  Trusting sometimes    Medication changes   None today  Non-pharmacological treatments   Continue with individual, group, milieu and occupational therapy using recovery principles and psycho-education about accepting illness and the need for treatment     Encourage to take showers twice a week and cooperate with treatment and adl skills as per her behav  Plan   Therapeutic passes on hold due to covid 19 lock down   Prepare for the [facility] and encouraged to accept  rather than refuse it   Reminded to attend at least 25% of groups on a daily basis including [facility]      Safety   Safety and communication plan established to target dynamic risk factors discussed above  Discharge Plan   · back to a [facility]    Interval Progress   Patient continues to he has psychosomatic and again refused to take a shower despite promising she was going to yesterday and has gone without showers for about 6 days in a row and without taking a shower with shampoo for about a month and continues to present with tangled poorly groomed long uncombed hair and appears disheveled poorly groomed unkept  She is still resistive coming up with multiple excuses for her behaviors and is still accusing staff of tampering with her medications or with her oxygen concentrator  She knows that she needs to attend 25% of groups and attend to ADLs skills to be eligible to go back to the sport at the [facility] [place] [facility] where she was already accepted  However she remains passive-aggressive in not complying with the behavior plan all the time  He still reports some ideas of reference and vague paranoia and claims to hear voices inside her head but refuses to add any new medications offered like Abilify  Sleep:   Fair  Appetite:   Fair but she picks and chooses her food  Compliance with medications:  Good  Side effects:   none but claims to feel tired sometimes  Review of systems:  Continues to have psychosomatic symptoms as usual  Group attendance:  Poor    Mental Status Exam  Appearance:    Patient was found laying on her bed but did get up when approached , better groomed Looks older than her stated age,and not very well kept, with uncombed hairt but  wearing clean clothing   Anxious preoccupied    Has good eye contact   Suspicious in her interaction  Behavior:    Superficially friendly pleasant but anxious suspicious and preoccupied  Speech:    tangential at times with tendency to become stilted   Mood:     anxious sad and irritated times,    Affect:    increased in intensity range mood congruent redirectable and her affect was brighter towards the end of the interview  Thought Process:   Circumstantial with tendency to have stilted speech   Thought Content:   Remains paranoid about motives of staff switching her medications or tampering with her inhalant or her oxygen concentrator off and on  No preoccupation with violence or suicide  No current suicidal homicidal thoughts intent or plans reported  No phobias but has obsessions and compulsions about examining her pills before she takes them  Karrie Nissen Psychosomatic about dying by choking from food and from heart attack or from shortness of breath and now from catching Covid-19 which are all ongoing beliefs  She believe she may turn blue and die if she lifts her hands up to apply shampoo on her head  Perceptual Disturbances:  No longer claims to hear voices   Risk Potential:   Ability to care for herself and refusal to take showers  Sensorium:   fully oriented to place, person, time, date, day, month, year and to situation  Cognition:    Grossly intact, aware of current events like the corona virus situation, recent and remote memory intact     No language deficit  Consciousness:   Alert and awake  Attention and concentration:  fair  Intellect:    average  Insight:    limited  Judgment:    fair  Motor Activity:  No abnormal involuntary movement noted today    Vitals  Temp:  [97 5 °F (36 4 °C)] 97 5 °F (36 4 °C)  HR:  [100] 100  Resp:  [16] 16  BP: (110)/(70) 110/70  SpO2:  [95 %] 95 %  No intake or output data in the 24 hours ending 06/04/20 0806    Lab Results: No New Labs Available For Today          Current Facility-Administered Medications:  acetaminophen 325 mg Oral Q6H PRN Teri Huggins MD   acetaminophen 650 mg Oral Q6H PRN Teri Huggins MD   acetaminophen 650 mg Oral Q8H PRN Teri Huggins MD   albuterol 2 puff Inhalation Q4H PRN Teri Huggins MD   aluminum-magnesium hydroxide-simethicone 15 mL Oral Q4H PRN Teri Huggins MD   ammonium lactate 1 application Topical BID PRN Teri Huggins MD   benzonatate 100 mg Oral TID PRN Teri Huggins MD   benztropine 1 mg Intramuscular Q8H PRN Teri Huggins MD   carbamide peroxide 5 drop Left Ear BID PRN Teri Huggins MD   cloZAPine 25 mg Oral BID Teri Huggins MD   cloZAPine 50 mg Oral BID Teri Huggins MD   docusate sodium 100 mg Oral BID PRN Teri Huggins MD   EPINEPHrine PF 0 15 mg Intramuscular Once PRN Teri Huggins MD   fluticasone-vilanterol 1 puff Inhalation Daily Teri Huggins MD   ketotifen 1 drop Right Eye BID PRN Teir Huggins MD   levothyroxine 125 mcg Oral Early Morning Teri Huggins MD   magnesium hydroxide 30 mL Oral Daily PRN Teri Huggins MD   montelukast 10 mg Oral HS Teri Huggins MD   OLANZapine 5 mg Intramuscular Q8H PRN Teri Huggins MD   OLANZapine 5 mg Oral Q8H PRN Teri Huggins MD   ondansetron 4 mg Oral Q6H PRN Teri Huggins MD   pantoprazole 40 mg Oral Early Morning Teri Huggins MD   polyethylene glycol 17 g Oral Daily PRN Teri Huggins MD   polyvinyl alcohol 1 drop Both Eyes Q3H PRN Teri Huggins MD   sertraline 200 mg Oral Daily Teri Huggins MD   sucralfate 1,000 mg Oral BID Mynor Garrido MD   theophylline 200 mg Oral Daily Teri Huggins MD   tiotropium 18 mcg Inhalation Daily Teri Huggins MD   traZODone 25 mg Oral HS PRN Teri Huggins MD       Counseling / Coordination of Care: Total floor / unit time spent today 15 minutes  Greater than 50% of total time was spent with the patient and / or family counseling and / or somewhat receptive to supportive listening and teaching positive coping skills to deal with symptom mangement       Patient's Rights, confidentiality and exceptions to confidentiality, use of automated medical record, [address] staff access to medical record, and consent to treatment reviewed

## 2020-06-04 NOTE — PLAN OF CARE
Problem: Alteration in Thoughts and Perception  Goal: Verbalize thoughts and feelings  Description  Interventions:  - Promote a nonjudgmental and trusting relationship with the patient through active listening and therapeutic communication  - Assess patient's level of functioning, behavior and potential for risk  - Engage patient in 1 on 1 interactions for a minimum of 15 minutes each session  - Encourage patient to express fears, feelings, frustrations, and discuss symptoms    - [place] patient to reality, help patient recognize reality-based thinking   - Administer medications as ordered and assess for potential side effects  - Provide the patient education related to the signs and symptoms of the illness and desired effects of prescribed medications  Outcome: Progressing  Goal: Agree to be compliant with medication regime, as prescribed and report medication side effects  Description  Interventions:  - Offer appropriate PRN medication and supervise ingestion; conduct aims, as needed   Outcome: Progressing     Problem: Alteration in Thoughts and Perception  Goal: Attend and participate in unit activities, including therapeutic, recreational, and educational groups  Description  Interventions:  - Provide therapeutic and educational activities daily, encourage attendance and participation, and document same in the medical record     CERTIFIED PEER SPECIALIST INTERVENTIONS:    Complete peer assessment with patient to assess their needs and identify their goals to complete while in the recovery program as well as once discharged into the community  Patient will complete WRAP Plan, Crisis Plan and 5 Life Domains  Patient will attend 50% of groups offered on the unit  Patient will complete a goal card weekly      Outcome: Not Progressing  Goal: Complete daily ADLs, including personal hygiene independently, as able  Description  Interventions:  - Observe, teach, and assist patient with ADLS  - Monitor and promote a balance of rest/activity, with adequate nutrition and elimination   Outcome: Not Progressing     Problem: Depression  Goal: Refrain from isolation  Description  Interventions:  - Develop a trusting relationship   - Encourage socialization   Outcome: Not Progressing     [address] continues to isolate in room in bed asleep  She is somatic saying it has been an off day  Worries that her pulse rate is too high, her breathing not adequate  Came out for scheduled medicines except the Singulair  Ate only couple bites meal, but, drink the Ensure, had an HS snack  Did an exemplary job using the [employer]; achieving consistent 1250ml volumes or slightly better  Did not attend any programming offered  Did not address hygiene; 6 days since last shower, well over a month since last shampoo  Unkempt, hair matting, uncombed  Is wearing now her QHS humidified nasal [email or web address] for bed

## 2020-06-04 NOTE — PROGRESS NOTES
06/04/20 1100   Activity/Group Checklist   Group   (IMR/Spiritual Principals of Recovery)   Attendance Attended   Attendance Duration (min) 46-60   Interactions Interacted appropriately   Affect/Mood Appropriate;Bright;Normal range   Goals Achieved Identified feelings; Discussed coping strategies; Identified distorted thoughts/beliefs; Able to listen to others; Able to engage in interactions; Able to reflect/comment on own behavior;Able to manage/cope with feelings; Able to self-disclose

## 2020-06-04 NOTE — PROGRESS NOTES
Maggie maintained on ongoing fall and SAFE precaution  Sedrick Baumgarten in bed with eyes closed, breath even and unlabored   On O2 with humidifier @1L/m via nasal cannula  Continues rounding implemented   No somatic complaint overnight  No PRN needed for sleep aid   No indication of pain or discomfort   Will continue to monitor

## 2020-06-04 NOTE — PROGRESS NOTES
06/04/20 1256   Team Meeting   Meeting Type Daily Rounds   Team Members Present   Team Members Present Physician;;Nurse;; Other (Discipline and Name)   Physician Team Member Bari Lisa Anna Management Team Member ASHLYN Hua   Social Work Team Member Moisés Greene [facility]; DIANA Tyler     Disheveled, labs in [place]  Last shower 5/28

## 2020-06-05 NOTE — PROGRESS NOTES
Psychiatry Progress Note [address] 60 y o  female MRN: [id number]  [facility] Encounter: [id number]  Code Status: Level 1 - Full Code    PCP: Poli Nguyen PA-C    Date of Admission:  7/23/2019 1730   Date of Service:  06/05/20  Patient Active Problem List   Diagnosis    COPD with asthma ([facility]    Tobacco use disorder, continuous    Compression fracture of L4 lumbar vertebra    Ventral hernia    Acute on chronic respiratory failure with hypoxia ([facility]    [facility]ive [facility], [facility]    Acquired hypothyroidism    Gastroesophageal reflux disease without esophagitis    Abnormal CT of the chest    Excessive cerumen in left ear canal    Lipoma of right upper extremity    Noncompliant with deep vein thrombosis (DVT) prophylaxis    At risk for aspiration    Ear ache    Tachycardia    Chest pain    Low HDL (under 40)     Diagnosis schizoaffective bipolar  Assessment    Overall Status:  Still with no shower in about a week remaining poorly groomed with matted long unkempt hair wearing same clothing preferring to lay back on bed attending selective groups and still psychosomatic questioning her medications as well as accusing staff of tampering with medications and the oxygen concentrator and refused blood work this morning    Certification Statement to demonstrate a period of stability by attending to ADLs and becoming less psychosomatic in attending more groups Acceptance by patient:  Accepting  [place] Living in recovery:  Living at another personal care home   Understanding of medications:  Patient is aware about risks side effects benefits and precautions of medications   Involved in reintegration process:   On hold due to [address] in relationship with psychiatrist:  Trusting sometimes    Medication changes   None today  Non-pharmacological treatments   Continue with individual, group, milieu and occupational therapy using recovery principles and psycho-education about accepting illness and the need for treatment   Encourage to take showers twice a week and cooperate with treatment and adl skills as per her behav  Plan   Therapeutic passes on hold due to covid 19 lock down   Prepare for the [facility] and encouraged to accept  rather than refuse it   Reminded to attend at least 25% of groups on a daily basis including IMR      Safety   Safety and communication plan established to target dynamic risk factors discussed above  Discharge Plan   · back to a [facility] at [address]   Patient is still psychosomatic accuses staff of tampering with her medications and oxygen concentrator and examines her pills before she takes them questioning if they are the right once  She continues to refuse showers for about a week in a row now and remains poorly groomed with tangled long uncombed hair and appears disheveled unkept but with no body odor when approached in her room  She still comes up with multiple excuses for not taking showers  She understands the minimal requirement of groups and expectation of attending to ADLs skills to be eligible to go back to the [place] personal care [place] and she claims that she will but does not those remaining passive-aggressive  He she still admits to having passive ideas of reference and vague paranoia but again refuses to add any new medications or change medications  Sleep:   Fair  Appetite:   Fair but she picks and chooses her food  Compliance with medications:  Good  Side effects:   none but claims to feel tired sometimes  Review of systems:  Continues to have psychosomatic symptoms as usual  Group attendance:  Poor    Mental Status Exam  Appearance:    Patient was found laying on her bed but did get up when approached , better groomed Looks older than her stated age,and not very well kept, with uncombed hairt but  wearing clean clothing   Anxious preoccupied    Has good eye contact   Suspicious in her interaction  Behavior:    Superficially friendly pleasant but anxious suspicious and preoccupied  Speech:    tangential at times with tendency to become stilted   Mood:     anxious sad and irritated times,    Affect:    increased in intensity range mood congruent redirectable and her affect was brighter towards the end of the interview  Thought Process:   Circumstantial with tendency to have stilted speech   Thought Content:   Remains paranoid about motives of staff switching her medications or tampering with her inhalant or her oxygen concentrator off and on  No preoccupation with violence or suicide  No current suicidal homicidal thoughts intent or plans reported  No phobias but has obsessions and compulsions about examining her pills before she takes them  Karla Hernandez Psychosomatic about dying by choking from food and from heart attack or from shortness of breath and now from catching Covid-19 which are all ongoing beliefs  She believe she may turn blue and die if she lifts her hands up to apply shampoo on her head  Perceptual Disturbances:  No longer claims to hear voices   Risk Potential:   Ability to care for herself and refusal to take showers  Sensorium:   fully oriented to place, person, time, date, day, month, year and to situation  Cognition:    Grossly intact, aware of current events like the corona virus situation, recent and remote memory intact     No language deficit  Consciousness:   Alert and awake  Attention and concentration:  fair  Intellect:    average  Insight:    limited  Judgment:    fair  Motor Activity:  No abnormal involuntary movement noted today    Vitals  Temp:  [96 9 °F (36 1 °C)] 96 9 °F (36 1 °C)  HR:  [] 81  Resp:  [14] 14  BP: (90)/(66) 90/66  SpO2:  [92 %-99 %] 99 %  No intake or output data in the 24 hours ending 06/05/20 0746    Lab Results: No New Labs Available For Today          Current Facility-Administered Medications:  acetaminophen 325 mg Oral Q6H PRN Vincent Sides, MD   acetaminophen 650 mg Oral Q6H PRN [place] Sides, MD   acetaminophen 650 mg Oral Q8H PRN Vincent Sides, MD   albuterol 2 puff Inhalation Q4H PRN [place] Sides, MD   aluminum-magnesium hydroxide-simethicone 15 mL Oral Q4H PRN Vincent Sides, MD   ammonium lactate 1 application Topical BID PRN [place] Sides, MD   benzonatate 100 mg Oral TID PRN [place] Sides, MD   benztropine 1 mg Intramuscular Q8H PRN Vincent Sides, MD   carbamide peroxide 5 drop Left Ear BID PRN [place] Sides, MD   cloZAPine 25 mg Oral BID Vincent Sides, MD   cloZAPine 50 mg Oral BID Vincent Sides, MD   docusate sodium 100 mg Oral BID PRN Vincent Sides, MD   EPINEPHrine PF 0 15 mg Intramuscular Once PRN Vincent Sides, MD   fluticasone-vilanterol 1 puff Inhalation Daily Will[place] Sides, MD   ketotifen 1 drop Right Eye BID PRN Vincent Sides, MD   levothyroxine 125 mcg Oral Early Morning Vincent Sides, MD   magnesium hydroxide 30 mL Oral Daily PRN Vincent Sides, MD   montelukast 10 mg Oral HS Will[place]brook Sides, MD   OLANZapine 5 mg Intramuscular Q8H PRN [place] Sides, MD   OLANZapine 5 mg Oral Q8H PRN [place] Sides, MD   ondansetron 4 mg Oral Q6H PRN [place] Sides, MD   pantoprazole 40 mg Oral Early Morning Vincent Sides, MD   polyethylene glycol 17 g Oral Daily PRN [place] Sides, MD   polyvinyl alcohol 1 drop Both Eyes Q3H PRN [place] Sides, MD   sertraline 200 mg Oral Daily Will[place]brook Sides, MD   sucralfate 1,000 mg Oral BID Temitope Nay, MD   theophylline 200 mg Oral Daily Vincent Sides, MD   tiotropium 18 mcg Inhalation Daily Will[place]brook Sides, MD   traZODone 25 mg Oral HS PRN [place] Sides, MD       Counseling / Coordination of Care: Total floor / unit time spent today 15 minutes  Greater than 50% of total time was spent with the patient and / or family counseling and / or somewhat receptive to supportive listening and teaching positive coping skills to deal with symptom mangement       Patient's Rights, confidentiality and exceptions to confidentiality, use of automated medical record, [address] staff access to medical record, and consent to treatment reviewed

## 2020-06-05 NOTE — PROGRESS NOTES
06/05/20 1045   Team Meeting   Meeting Type Daily Rounds   Team Members Present   Team Members Present Physician;;Nurse;   Physician Team Member Dr Alfred Whitley RN   Care Management Team Member Ebony Gil   Social Work Team Member [facility]; [facility]     Paranoid regarding medications   25/0/100

## 2020-06-05 NOTE — PROGRESS NOTES
06/05/20 0900   Activity/Group Checklist   Group Community meeting  (Morning Walk)   Attendance Did not attend     Patient was encouraged to attend Morning Walk, but declined  Patient was in bed when prompted

## 2020-06-05 NOTE — PROGRESS NOTES
~Maggie maintained on ongoing fall and SAFE precaution   Laying in bed with eyes closed, breath even and unlabored   On O2 with humidifier @1L/m via nasal cannula  Continues rounding implemented   No somatic complaint overnight  No PRN needed for sleep aid   No indication of pain or discomfort  Will continue to monitor  ~Maggie has multiple lab work: CBC w/Diff, CK, BNP, Troponin, and C reactive proteinto be obtain this  morning

## 2020-06-05 NOTE — PLAN OF CARE
Problem: Alteration in Thoughts and Perception  Goal: Treatment Goal: Gain control of psychotic behaviors/thinking, reduce/eliminate presenting symptoms and demonstrate improved reality functioning upon discharge  Outcome: Progressing  Goal: Verbalize thoughts and feelings  Description  Interventions:  - Promote a nonjudgmental and trusting relationship with the patient through active listening and therapeutic communication  - Assess patient's level of functioning, behavior and potential for risk  - Engage patient in 1 on 1 interactions for a minimum of 15 minutes each session  - Encourage patient to express fears, feelings, frustrations, and discuss symptoms    - [place] patient to reality, help patient recognize reality-based thinking   - Administer medications as ordered and assess for potential side effects  - Provide the patient education related to the signs and symptoms of the illness and desired effects of prescribed medications  Outcome: Progressing  Goal: Agree to be compliant with medication regime, as prescribed and report medication side effects  Description  Interventions:  - Offer appropriate PRN medication and supervise ingestion; conduct aims, as needed   Outcome: Progressing  Goal: Attend and participate in unit activities, including therapeutic, recreational, and educational groups  Description  Interventions:  - Provide therapeutic and educational activities daily, encourage attendance and participation, and document same in the medical record     CERTIFIED PEER SPECIALIST INTERVENTIONS:    Complete peer assessment with patient to assess their needs and identify their goals to complete while in the recovery program as well as once discharged into the community  Patient will complete WRAP Plan, Crisis Plan and 5 Life Domains  Patient will attend 50% of groups offered on the unit  Patient will complete a goal card weekly      Outcome: Progressing  Goal: Recognize dysfunctional thoughts, communicate reality-based thoughts at the time of discharge  Description  Interventions:  - Provide medication and psycho-education to assist patient in compliance and developing insight into his/her illness   Outcome: Progressing     Problem: Ineffective Coping  Goal: Participates in unit activities  Description  Interventions:  - Provide therapeutic environment   - Provide required programming   - Redirect inappropriate behaviors   Outcome: Progressing  Goal: Patient/Family verbalizes awareness of resources  Outcome: Progressing  Goal: Understands least restrictive measures  Description  Interventions:  - Utilize least restrictive behavior  Outcome: Progressing     Problem: Risk for Self Injury/Neglect  Goal: Treatment Goal: Remain safe during length of stay, learn and adopt new coping skills, and be free of self-injurious ideation, impulses and acts at the time of discharge  Outcome: Progressing  Goal: Verbalize thoughts and feelings  Description  Interventions:  - Assess and re-assess patient's lethality and potential for self-injury  - Engage patient in 1:1 interactions, daily, for a minimum of 15 minutes  - Encourage patient to express feelings, fears, frustrations, hopes  - Establish rapport/trust with patient   Outcome: Progressing  Goal: Refrain from harming self  Description  Interventions:  - Monitor patient closely, per order  - Develop a trusting relationship  - Supervise medication ingestion, monitor effects and side effects   Outcome: Progressing  Goal: Attend and participate in unit activities, including therapeutic, recreational, and educational groups  Description  Interventions:  - Provide therapeutic and educational activities daily, encourage attendance and participation, and document same in the medical record  - Obtain collateral information, encourage visitation and family involvement in care   Outcome: Progressing     Problem: Depression  Goal: Treatment Goal: Demonstrate behavioral control of depressive symptoms, verbalize feelings of improved mood/affect, and adopt new coping skills prior to discharge  Outcome: Progressing  Goal: Verbalize thoughts and feelings  Description  Interventions:  - Assess and re-assess patient's level of risk   - Engage patient in 1:1 interactions, daily, for a minimum of 15 minutes   - Encourage patient to express feelings, fears, frustrations, hopes   Outcome: Progressing  Goal: Refrain from isolation  Description  Interventions:  - Develop a trusting relationship   - Encourage socialization   Outcome: Progressing     Problem: Anxiety  Goal: Anxiety is at manageable level  Description  Interventions:  - Assess and monitor patient's anxiety level  - Monitor for signs and symptoms of anxiety both physical and emotional (heart palpitations, chest pain, shortness of breath, headaches, nausea, feeling jumpy, restlessness, irritable, apprehensive)  - Collaborate with interdisciplinary team and initiate plan and interventions as ordered    - [place] patient to unit/surroundings  - Explain treatment plan  - Encourage participation in care  - Encourage verbalization of concerns/fears  - Identify coping mechanisms  - Assist in developing anxiety-reducing skills  - Administer/offer alternative therapies  - Limit or eliminate stimulants  Outcome: Progressing     Problem: Alteration in Orientation  Goal: Interact with staff daily  Description  Interventions:  - Assess and re-assess patient's level of orientation  - Engage patient in 1 on 1 interactions, daily, for a minimum of 15 minutes   - Establish rapport/trust with patient   Outcome: Progressing  Goal: Cooperate with recommended testing/procedures  Description  Interventions:  - Determine need for ancillary testing  - Observe for mental status changes  - Implement falls/precaution protocol   Outcome: Progressing     Problem: PAIN - ADULT  Goal: Verbalizes/displays adequate comfort level or baseline comfort level  Description  Interventions:  - Encourage patient to monitor pain and request assistance  - Assess pain using appropriate pain scale  - Administer analgesics based on type and severity of pain and evaluate response  - Implement non-pharmacological measures as appropriate and evaluate response  - Consider cultural and social influences on pain and pain management  - Notify physician/advanced practitioner if interventions unsuccessful or patient reports new pain  Outcome: Progressing     Problem: SAFETY ADULT  Goal: Patient will remain free of falls  Description  INTERVENTIONS:  - Assess patient frequently for physical needs  -  Identify cognitive and physical deficits and behaviors that affect risk of falls  -  [place] fall precautions as indicated by assessment   - Educate patient/family on patient safety including physical limitations  - Instruct patient to call for assistance with activity based on assessment  - Modify environment to reduce risk of injury  - Consider OT/PT consult to assist with strengthening/mobility  Outcome: Progressing     Problem: Nutrition/Hydration-ADULT  Goal: Nutrient/Hydration intake appropriate for improving, restoring or maintaining nutritional needs  Description  Monitor and assess patient's nutrition/hydration status for malnutrition  Collaborate with interdisciplinary team and initiate plan and interventions as ordered  Monitor patient's weight and dietary intake as ordered or per policy  Utilize nutrition screening tool and intervene as necessary  Determine patient's food preferences and provide high-protein, high-caloric foods as appropriate       INTERVENTIONS:  - Monitor oral intake, urinary output, labs, and treatment plans  - Assess nutrition and hydration status and recommend course of action  - Evaluate amount of meals eaten  - Assist patient with eating if necessary   - Allow adequate time for meals  - Recommend/ encourage appropriate diets, oral nutritional supplements, and vitamin/mineral supplements  - Order, calculate, and assess calorie counts as needed  - Recommend, monitor, and adjust tube feedings and TPN/PPN based on assessed needs  - Assess need for intravenous fluids  - Provide specific nutrition/hydration education as appropriate  - Include patient/family/caregiver in decisions related to nutrition  Outcome: Progressing     Problem: Alteration in Thoughts and Perception  Goal: Complete daily ADLs, including personal hygiene independently, as able  Description  Interventions:  - Observe, teach, and assist patient with ADLS  - Monitor and promote a balance of rest/activity, with adequate nutrition and elimination   Outcome: Not Progressing     Problem: Risk for Self Injury/Neglect  Goal: Complete daily ADLs, including personal hygiene independently, as able  Description  Interventions:  - Observe, teach, and assist patient with ADLS  - Monitor and promote a balance of rest/activity, with adequate nutrition and elimination  Outcome: Not Progressing   Mostly isolative to her room, sleeping in her bed  Out for meds and meals and attended IMR group  Ate 50% of breakfast and 25% of lunch  Med compliant  No other behaviors or issues noted  Continue to monitor

## 2020-06-05 NOTE — PLAN OF CARE
Problem: Alteration in Thoughts and Perception  Goal: Agree to be compliant with medication regime, as prescribed and report medication side effects  Description  Interventions:  - Offer appropriate PRN medication and supervise ingestion; conduct aims, as needed   Outcome: Progressing     Problem: Alteration in Thoughts and Perception  Goal: Attend and participate in unit activities, including therapeutic, recreational, and educational groups  Description  Interventions:  - Provide therapeutic and educational activities daily, encourage attendance and participation, and document same in the medical record     CERTIFIED PEER SPECIALIST INTERVENTIONS:    Complete peer assessment with patient to assess their needs and identify their goals to complete while in the recovery program as well as once discharged into the community  Patient will complete WRAP Plan, Crisis Plan and 5 Life Domains  Patient will attend 50% of groups offered on the unit  Patient will complete a goal card weekly      Outcome: Not Progressing     Ate 100% of dinner +ensure for dinner, did not attend groups, visible, social with select peers, compliant with routine medications,  will continue to monitor

## 2020-06-06 NOTE — PROGRESS NOTES
Patient in bed, asleep at shift onset  Utilizing 1L humidified oxygen via NC  No respiratory distress  Appears to be sleeping throughout the night without difficulty  No behaviors or issues observed   Medication compliant this am

## 2020-06-06 NOTE — PLAN OF CARE
Problem: Alteration in Thoughts and Perception  Goal: Agree to be compliant with medication regime, as prescribed and report medication side effects  Description  Interventions:  - Offer appropriate PRN medication and supervise ingestion; conduct aims, as needed   Outcome: Progressing     Problem: Nutrition/Hydration-ADULT  Goal: Nutrient/Hydration intake appropriate for improving, restoring or maintaining nutritional needs  Description  Monitor and assess patient's nutrition/hydration status for malnutrition  Collaborate with interdisciplinary team and initiate plan and interventions as ordered  Monitor patient's weight and dietary intake as ordered or per policy  Utilize nutrition screening tool and intervene as necessary  Determine patient's food preferences and provide high-protein, high-caloric foods as appropriate       INTERVENTIONS:  - Monitor oral intake, urinary output, labs, and treatment plans  - Assess nutrition and hydration status and recommend course of action  - Evaluate amount of meals eaten  - Assist patient with eating if necessary   - Allow adequate time for meals  - Recommend/ encourage appropriate diets, oral nutritional supplements, and vitamin/mineral supplements  - Order, calculate, and assess calorie counts as needed  - Recommend, monitor, and adjust tube feedings and TPN/PPN based on assessed needs  - Assess need for intravenous fluids  - Provide specific nutrition/hydration education as appropriate  - Include patient/family/caregiver in decisions related to nutrition  Outcome: Progressing     Problem: Alteration in Thoughts and Perception  Goal: Attend and participate in unit activities, including therapeutic, recreational, and educational groups  Description  Interventions:  - Provide therapeutic and educational activities daily, encourage attendance and participation, and document same in the medical record     [address] :    Complete peer assessment with patient to assess their needs and identify their goals to complete while in the recovery program as well as once discharged into the community  Patient will complete WRAP Plan, Crisis Plan and 5 Life Domains  Patient will attend 50% of groups offered on the unit  Patient will complete a goal card weekly  Outcome: Not Progressing  Goal: Complete daily ADLs, including personal hygiene independently, as able  Description  Interventions:  - Observe, teach, and assist patient with ADLS  - Monitor and promote a balance of rest/activity, with adequate nutrition and elimination   Outcome: Not Progressing     Problem: Ineffective Coping  Goal: Demonstrates healthy coping skills  Outcome: Not Progressing     Problem: Depression  Goal: Refrain from isolation  Description  Interventions:  - Develop a trusting relationship   - Encourage socialization   Outcome: Not Ivette Sargent continues isolative to room & bed sleeping  Was awakened for supper medicine & meal  Did eat 100% of meal (baked tomato, mashed potato, cake, juice, milk), declined the Ensure  Has made no move whatsoever to address hygiene; hair matted 3/4 of it's length, no shower in 9 days, no shampoo in about 5-6 weeks  Has not responded to invitations to any of the evening programming offered  Unmotivated to help herself/participate

## 2020-06-06 NOTE — PROGRESS NOTES
2145 Maggie when prompted awake did come out for HS snack, had HS medicine (except Singulair)  Refused Incentive Spirometry; no explanation given  When her matted hair pointed out to her, commented, "I'm going to try not to worry about it " Wearing now her QHS humidified nasal O2 @ 1L for bed

## 2020-06-06 NOTE — PLAN OF CARE
Problem: Alteration in Thoughts and Perception  Goal: Verbalize thoughts and feelings  Description  Interventions:  - Promote a nonjudgmental and trusting relationship with the patient through active listening and therapeutic communication  - Assess patient's level of functioning, behavior and potential for risk  - Engage patient in 1 on 1 interactions for a minimum of 15 minutes each session  - Encourage patient to express fears, feelings, frustrations, and discuss symptoms    - [place] patient to reality, help patient recognize reality-based thinking   - Administer medications as ordered and assess for potential side effects  - Provide the patient education related to the signs and symptoms of the illness and desired effects of prescribed medications  Outcome: Progressing     Problem: Alteration in Orientation  Goal: Interact with staff daily  Description  Interventions:  - Assess and re-assess patient's level of orientation  - Engage patient in 1 on 1 interactions, daily, for a minimum of 15 minutes   - Establish rapport/trust with patient   Outcome: Progressing     Problem: SAFETY ADULT  Goal: Patient will remain free of falls  Description  INTERVENTIONS:  - Assess patient frequently for physical needs  -  Identify cognitive and physical deficits and behaviors that affect risk of falls    -  [place] fall precautions as indicated by assessment   - Educate patient/family on patient safety including physical limitations  - Instruct patient to call for assistance with activity based on assessment  - Modify environment to reduce risk of injury  - Consider OT/PT consult to assist with strengthening/mobility  Outcome: Progressing     Problem: Alteration in Thoughts and Perception  Goal: Attend and participate in unit activities, including therapeutic, recreational, and educational groups  Description  Interventions:  - Provide therapeutic and educational activities daily, encourage attendance and participation, and document same in the medical record     CERTIFIED PEER SPECIALIST INTERVENTIONS:    Complete peer assessment with patient to assess their needs and identify their goals to complete while in the recovery program as well as once discharged into the community  Patient will complete WRAP Plan, Crisis Plan and 5 Life Domains  Patient will attend 50% of groups offered on the unit  Patient will complete a goal card weekly  Outcome: Not Progressing  Goal: Complete daily ADLs, including personal hygiene independently, as able  Description  Interventions:  - Observe, teach, and assist patient with ADLS  - Monitor and promote a balance of rest/activity, with adequate nutrition and elimination   Outcome: Not Progressing     Problem: Depression  Goal: Refrain from isolation  Description  Interventions:  - Develop a trusting relationship   - Encourage socialization   Outcome: Not Progressing  Goal: Refrain from self-neglect  Outcome: Not Yoly Warner has been in her room, laying in bed, a majority of the day  Naps at times  Cooperative upon approach by staff  Minimal interaction with peers  Only comes out for medication and meals  Ate 10% (oatmeal) for breakfast  Took medication without swallowing difficulty  Disheveled appearance  Has not showered in over a week  Did not attend any groups  Continue to monitor  Precautions maintained

## 2020-06-06 NOTE — PLAN OF CARE
Problem: Alteration in Thoughts and Perception  Goal: Verbalize thoughts and feelings  Description  Interventions:  - Promote a nonjudgmental and trusting relationship with the patient through active listening and therapeutic communication  - Assess patient's level of functioning, behavior and potential for risk  - Engage patient in 1 on 1 interactions for a minimum of 15 minutes each session  - Encourage patient to express fears, feelings, frustrations, and discuss symptoms    - [place] patient to reality, help patient recognize reality-based thinking   - Administer medications as ordered and assess for potential side effects  - Provide the patient education related to the signs and symptoms of the illness and desired effects of prescribed medications  Outcome: Progressing  Goal: Agree to be compliant with medication regime, as prescribed and report medication side effects  Description  Interventions:  - Offer appropriate PRN medication and supervise ingestion; conduct aims, as needed   Outcome: Progressing     Problem: Alteration in Thoughts and Perception  Goal: Attend and participate in unit activities, including therapeutic, recreational, and educational groups  Description  Interventions:  - Provide therapeutic and educational activities daily, encourage attendance and participation, and document same in the medical record     CERTIFIED PEER SPECIALIST INTERVENTIONS:    Complete peer assessment with patient to assess their needs and identify their goals to complete while in the recovery program as well as once discharged into the community  Patient will complete WRAP Plan, Crisis Plan and 5 Life Domains  Patient will attend 50% of groups offered on the unit  Patient will complete a goal card weekly      Outcome: Not Progressing  Goal: Complete daily ADLs, including personal hygiene independently, as able  Description  Interventions:  - Observe, teach, and assist patient with ADLS  - Monitor and promote a balance of rest/activity, with adequate nutrition and elimination   Outcome: Not Progressing     Problem: Depression  Goal: Refrain from isolation  Description  Interventions:  - Develop a trusting relationship   - Encourage socialization   Outcome: Not Progressing     Problem: Nutrition/Hydration-ADULT  Goal: Nutrient/Hydration intake appropriate for improving, restoring or maintaining nutritional needs  Description  Monitor and assess patient's nutrition/hydration status for malnutrition  Collaborate with interdisciplinary team and initiate plan and interventions as ordered  Monitor patient's weight and dietary intake as ordered or per policy  Utilize nutrition screening tool and intervene as necessary  Determine patient's food preferences and provide high-protein, high-caloric foods as appropriate  INTERVENTIONS:  - Monitor oral intake, urinary output, labs, and treatment plans  - Assess nutrition and hydration status and recommend course of action  - Evaluate amount of meals eaten  - Assist patient with eating if necessary   - Allow adequate time for meals  - Recommend/ encourage appropriate diets, oral nutritional supplements, and vitamin/mineral supplements  - Order, calculate, and assess calorie counts as needed  - Recommend, monitor, and adjust tube feedings and TPN/PPN based on assessed needs  - Assess need for intravenous fluids  - Provide specific nutrition/hydration education as appropriate  - Include patient/family/caregiver in decisions related to nutrition  Outcome: Not Progressing     [address] Duncan continues to isolate in room in bed  Her waist length hair is now matted up to her neck in one area & still makes no effort, shows no interest in grooming; 8 days since last shower, close to 6weeks since last shampoo  She argues that she can drink her Ensure first, count it as an "meal"   Reinforced w/her that it is a nutritional supplement & she is expected to attempt to eat meal before has the Ensure  Still insisting tuna fish w/Ensure produces "adverse reaction"  Picked @ meal eating none, drank only the Ensure  Took supper medicine w/reminder  Has not responded to invitations to any of the evening programming options

## 2020-06-07 NOTE — PROGRESS NOTES
Principal problem:  Schizoaffective disorder, bipolar type ([facility] [place] [address] )      Plan:  1  Continue current medications as before  2  Continue current treatment therapy before as before  3  Continue discharge planning  4  Case discussed with staff  Interval history:  Patient reported that she is doing much better today she said that she just had a problem when she was given a mask to wear that she felt that now has chloroform and she kept on talking about that and she said she is paranoid about it  Overall she is doing well she said that she better today and she is taking her medication  She said that she is ready to go home she is denying any hallucinations delusions suicidal or homicidal ideas  Staff reported that patient is not much of a management problem on the unit she is usually to herself and usually stays in bed  When this writer was talking to her she was lying down which she was pleasant      Scheduled Meds:  Current Facility-Administered Medications:  acetaminophen 325 mg Oral Q6H PRN Lonnie Dykes MD   acetaminophen 650 mg Oral Q6H PRN Lonnie Dykes MD   acetaminophen 650 mg Oral Q8H PRN Lonnie Dykes MD   albuterol 2 puff Inhalation Q4H PRN Lonnie Dykes MD   aluminum-magnesium hydroxide-simethicone 15 mL Oral Q4H PRN Lonnie Dykes MD   ammonium lactate 1 application Topical BID PRBJORN Dykes MD   benzonatate 100 mg Oral TID PRBJORN Dykes MD   benztropine 1 mg Intramuscular Q8H PRN Lonnie Dykes MD   carbamide peroxide 5 drop Left Ear BID PRJBORN Dykes MD   cloZAPine 25 mg Oral BID Lonnie Dykes MD   cloZAPine 50 mg Oral BID Lonnie Dykes MD   docusate sodium 100 mg Oral BID PRN Lonnie Dykes MD   EPINEPHrine PF 0 15 mg Intramuscular Once PRN Lonnie Dykes MD   fluticasone-vilanterol 1 puff Inhalation Daily Lonnie Dykes MD   ketotifen 1 drop Right Eye BID PRBJORN Dykes MD   levothyroxine 125 mcg Oral Early Morning Lonnie Dykes MD   magnesium hydroxide 30 mL Oral Daily PRN MD vernell Yadav 10 mg Oral HS Isidoro Gonzalze MD   OLANZapine 5 mg Intramuscular Q8H PRN Isidoro Gonzalez MD   OLANZapine 5 mg Oral Q8H PRN Isidoro Gonzalez MD   ondansetron 4 mg Oral Q6H PRN Isidoro Gonzalez MD   pantoprazole 40 mg Oral Early Morning Isidoro Gonzalez MD   polyethylene glycol 17 g Oral Daily PRN Isidoro Gonzalez MD   polyvinyl alcohol 1 drop Both Eyes Q3H PRN Isidoro Gonzalez MD   sertraline 200 mg Oral Daily Isidoro Gonzalez MD   sucralfate 1,000 mg Oral BID Arin Parker MD   theophylline 200 mg Oral Daily Isidoro Gonzalez MD   tiotropium 18 mcg Inhalation Daily Isidoro Gonzalez MD   traZODone 25 mg Oral HS PRN Isidoro Gonzalez MD     Continuous Infusions:   PRN Meds:   acetaminophen    acetaminophen    acetaminophen    albuterol    aluminum-magnesium hydroxide-simethicone    ammonium lactate    benzonatate    benztropine    carbamide peroxide    docusate sodium    EPINEPHrine PF    ketotifen    magnesium hydroxide    OLANZapine    OLANZapine    ondansetron    polyethylene glycol    polyvinyl alcohol    traZODone   Mild jaw movements were noted but had EPS from psychotropic medications  Allergies   Allergen Reactions    Peanut-Containing Drug Products Anaphylaxis    Shellfish-Derived Products Anaphylaxis    Antihistamines, Chlorpheniramine-Type     Aspirin     Atrovent [Ipratropium]     Elavil [Amitriptyline]     Iodine      Other reaction(s): Unknown Reaction    Levaquin [Levofloxacin]     Novocain [Procaine]     Penicillins      Able to tolerate azithromycin and vancomycin    Prolixin [Fluphenazine]     Sulfa Antibiotics Other (See Comments)     Unknown Zithromax-Tight Throat    Zithromax [Azithromycin] Throat Swelling     Tight throat    Adhesive [Medical Tape] Rash     Skin irritation from adhesive on EKG leads when worn for an extended period         Vitals:    06/06/20 0732 06/06/20 2000 06/07/20 0745 06/07/20 0747   BP: 108/69 94/58 92/72 (!) 86/68   BP Location: Left arm Left arm Right arm Right arm   Pulse: 90 87 80 81   Resp: 20 14 18    Temp: (!) 97 4 °F (36 3 °C) (!) 97 3 °F (36 3 °C) 97 8 °F (36 6 °C)    TempSrc: Temporal Temporal Temporal    SpO2:  95%     Weight:   64 kg (141 lb)    Height:             Review of systems:  Patient is complaining of trouble swallowing however she said that she eating meals sometimes she avoided  She is not complaining of any other medical problems review of system negative  Mental status examination:  Patient is lying in bed with talk to this writer she was pleasant and cooperative  She was alert awake oriented x3  Speech is normal goal-directed  Her thought process at times is disorganized however she was able to collect herself again  She is paranoid about in ask that was given to her  She denies any other paranoia or delusions denies any hallucinations  Denies any suicidal homicidal ideas  Her psychomotor activity is decreased day this writer did not observe her gait today  She has poor insight and impaired judgment  Reported mood is good affect is anxious  This writer spent 20 minutes talking to patient, reviewing chart, completing note and discussing the case with staff      Tessa Poe MD

## 2020-06-07 NOTE — PROGRESS NOTES
Sleeping at this time, no distress or issues noted, safe precautions in place and maintained   Monitoring continues

## 2020-06-07 NOTE — PLAN OF CARE
Problem: Alteration in Thoughts and Perception  Goal: Verbalize thoughts and feelings  Description  Interventions:  - Promote a nonjudgmental and trusting relationship with the patient through active listening and therapeutic communication  - Assess patient's level of functioning, behavior and potential for risk  - Engage patient in 1 on 1 interactions for a minimum of 15 minutes each session  - Encourage patient to express fears, feelings, frustrations, and discuss symptoms    - [place] patient to reality, help patient recognize reality-based thinking   - Administer medications as ordered and assess for potential side effects  - Provide the patient education related to the signs and symptoms of the illness and desired effects of prescribed medications  Outcome: Progressing  Goal: Agree to be compliant with medication regime, as prescribed and report medication side effects  Description  Interventions:  - Offer appropriate PRN medication and supervise ingestion; conduct aims, as needed   Outcome: Progressing     Problem: Alteration in Orientation  Goal: Interact with staff daily  Description  Interventions:  - Assess and re-assess patient's level of orientation  - Engage patient in 1 on 1 interactions, daily, for a minimum of 15 minutes   - Establish rapport/trust with patient   Outcome: Progressing     Problem: Alteration in Thoughts and Perception  Goal: Attend and participate in unit activities, including therapeutic, recreational, and educational groups  Description  Interventions:  - Provide therapeutic and educational activities daily, encourage attendance and participation, and document same in the medical record     CERTIFIED PEER SPECIALIST INTERVENTIONS:    Complete peer assessment with patient to assess their needs and identify their goals to complete while in the recovery program as well as once discharged into the community       Patient will complete WRAP Plan, Crisis Plan and 5 Life Domains  Patient will attend 50% of groups offered on the unit  Patient will complete a goal card weekly  Outcome: Not Progressing  Goal: Complete daily ADLs, including personal hygiene independently, as able  Description  Interventions:  - Observe, teach, and assist patient with ADLS  - Monitor and promote a balance of rest/activity, with adequate nutrition and elimination   Outcome: Not Progressing     Problem: Depression  Goal: Refrain from isolation  Description  Interventions:  - Develop a trusting relationship   - Encourage socialization   Outcome: Not Progressing  Goal: Refrain from self-neglect  Outcome: Not Raiza Lynne has been in her room most of the day  Naps at times  Quiet and keeping to herself  Interacts with select peers when in the dining room during meals  Pleasant and cooperative upon approach by staff  Disheveled appearance due to not showering for over a week  Took pills and did inhalers without difficulty  Ate 50% of breakfast  Did not attend any groups  Delusional about mask  She stated that "on Friday they got me a new mask and when I put it on it smelled like Chloroform  I gave it to staff and they told me to wear it because they could not smell it  That is why I worry about my food"  Ate 25% of lunch  Continue to monitor  Precautions maintained
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
## 2020-06-07 NOTE — PROGRESS NOTES
2145 Alease Severs did perform her Incentive Spirometry doing well tonight, consistent 1250ml volume  She came out for HS snack, took her HS medicine except the Singulair  Wearing now her QHS humidified nasal O2 @ 1L for bed

## 2020-06-07 NOTE — PLAN OF CARE
Problem: Alteration in Thoughts and Perception  Goal: Verbalize thoughts and feelings  Description  Interventions:  - Promote a nonjudgmental and trusting relationship with the patient through active listening and therapeutic communication  - Assess patient's level of functioning, behavior and potential for risk  - Engage patient in 1 on 1 interactions for a minimum of 15 minutes each session  - Encourage patient to express fears, feelings, frustrations, and discuss symptoms    - [place] patient to reality, help patient recognize reality-based thinking   - Administer medications as ordered and assess for potential side effects  - Provide the patient education related to the signs and symptoms of the illness and desired effects of prescribed medications  Outcome: Progressing  Goal: Agree to be compliant with medication regime, as prescribed and report medication side effects  Description  Interventions:  - Offer appropriate PRN medication and supervise ingestion; conduct aims, as needed   Outcome: Progressing  Goal: Complete daily ADLs, including personal hygiene independently, as able  Description  Interventions:  - Observe, teach, and assist patient with ADLS  - Monitor and promote a balance of rest/activity, with adequate nutrition and elimination   Outcome: Progressing     Problem: Alteration in Thoughts and Perception  Goal: Attend and participate in unit activities, including therapeutic, recreational, and educational groups  Description  Interventions:  - Provide therapeutic and educational activities daily, encourage attendance and participation, and document same in the medical record     CERTIFIED PEER SPECIALIST INTERVENTIONS:    Complete peer assessment with patient to assess their needs and identify their goals to complete while in the recovery program as well as once discharged into the community  Patient will complete WRAP Plan, Crisis Plan and 5 Life Domains      Patient will attend 50% of groups offered on the unit  Patient will complete a goal card weekly  Outcome: Not Progressing     Problem: Depression  Goal: Refrain from isolation  Description  Interventions:  - Develop a trusting relationship   - Encourage socialization   Outcome: Not Progressing     1943 Chuy Hahn expressed desire to shower this evening  Due to the deplorable matted condition in which she had allowed her hair to reach, staff did out of necessity assist her w/care  This nurse worked to [employer] out the extensive matting, much breakage evident  MHT shampooed & conditioned her hair, dried it, nurse braided it to prevent further matting  She did wash herself, dry herself, dress once set up in handicap bathroom  Came out for supper medicine  Refused meal as "too relaxed" from shower to eat safely in her view  Did have the Ensure  Continues to talk about the "chloroform" that was in the cloth mask she was given Friday  "These kinds of things can happen " Rejects any other explanation for her perception  Asleep in bed since conclusion of meal  Has not responded to invitations to offered evening programming

## 2020-06-08 NOTE — PROGRESS NOTES
Sleeping at this time  O2 1L NC on , no s/s of distress   Safe and fall precautions in place and maintained, monitoring continues

## 2020-06-08 NOTE — PLAN OF CARE

## 2020-06-08 NOTE — PROGRESS NOTES
06/08/20 1100   Activity/Group Checklist   Group   ([employer] )   Attendance Attended   Attendance Duration (min) 46-60   Interactions Interacted appropriately   Affect/Mood Appropriate;Bright;Normal range   Goals Achieved Identified feelings; Discussed coping strategies; Able to listen to others; Able to engage in interactions

## 2020-06-08 NOTE — PROGRESS NOTES
Patient declined      06/08/20 0900   Activity/Group Checklist   Group Community meeting   Attendance Did not attend   Attendance Duration (min) 31-45   Affect/Mood IRMA

## 2020-06-08 NOTE — PLAN OF CARE
Problem: Alteration in Thoughts and Perception  Goal: Verbalize thoughts and feelings  Description  Interventions:  - Promote a nonjudgmental and trusting relationship with the patient through active listening and therapeutic communication  - Assess patient's level of functioning, behavior and potential for risk  - Engage patient in 1 on 1 interactions for a minimum of 15 minutes each session  - Encourage patient to express fears, feelings, frustrations, and discuss symptoms    - [place] patient to reality, help patient recognize reality-based thinking   - Administer medications as ordered and assess for potential side effects  - Provide the patient education related to the signs and symptoms of the illness and desired effects of prescribed medications  6/8/2020 1938 by Bronwyn Crigler, RN  Outcome: Progressing  6/8/2020 1937 by Bronwyn Crigler, RN  Outcome: Progressing  Goal: Agree to be compliant with medication regime, as prescribed and report medication side effects  Description  Interventions:  - Offer appropriate PRN medication and supervise ingestion; conduct aims, as needed   6/8/2020 1938 by Bronwyn Crigler, RN  Outcome: Progressing  6/8/2020 1937 by Bronwyn Crigler, RN  Outcome: Progressing     Problem: Alteration in Thoughts and Perception  Goal: Attend and participate in unit activities, including therapeutic, recreational, and educational groups  Description  Interventions:  - Provide therapeutic and educational activities daily, encourage attendance and participation, and document same in the medical record     CERTIFIED PEER SPECIALIST INTERVENTIONS:    Complete peer assessment with patient to assess their needs and identify their goals to complete while in the recovery program as well as once discharged into the community  Patient will complete WRAP Plan, Crisis Plan and 5 Life Domains  Patient will attend 50% of groups offered on the unit  Patient will complete a goal card weekly  6/8/2020 1938 by Irwin Ferris RN  Outcome: Not Progressing  6/8/2020 1937 by Irwin Ferris RN  Outcome: Not Progressing     Problem: Depression  Goal: Refrain from isolation  Description  Interventions:  - Develop a trusting relationship   - Encourage socialization   6/8/2020 1938 by Irwin Ferris RN  Outcome: Not Progressing  6/8/2020 1937 by Irwin Ferris RN  Outcome: Progressing     Problem: Nutrition/Hydration-ADULT  Goal: Nutrient/Hydration intake appropriate for improving, restoring or maintaining nutritional needs  Description  Monitor and assess patient's nutrition/hydration status for malnutrition  Collaborate with interdisciplinary team and initiate plan and interventions as ordered  Monitor patient's weight and dietary intake as ordered or per policy  Utilize nutrition screening tool and intervene as necessary  Determine patient's food preferences and provide high-protein, high-caloric foods as appropriate  INTERVENTIONS:  - Monitor oral intake, urinary output, labs, and treatment plans  - Assess nutrition and hydration status and recommend course of action  - Evaluate amount of meals eaten  - Assist patient with eating if necessary   - Allow adequate time for meals  - Recommend/ encourage appropriate diets, oral nutritional supplements, and vitamin/mineral supplements  - Order, calculate, and assess calorie counts as needed  - Recommend, monitor, and adjust tube feedings and TPN/PPN based on assessed needs  - Assess need for intravenous fluids  - Provide specific nutrition/hydration education as appropriate  - Include patient/family/caregiver in decisions related to nutrition  Outcome: Jannette Garciabrook Erazo is requesting Podiatry consult as says her toenails are long, curling, making wearing shoes painful, & is unable to cut them herself  Also wanting to see the dietician again as still finding food selection largely inedible   Was directed to talk to her doctor about these needs  She came out when called for supper medicine, refused meal, but, drank Ensure  She did not respond to invitation to optional "[employer] group  She isolates in her room in bed in free time  Is pleasant upon approach, but, a bit suspicious

## 2020-06-08 NOTE — PLAN OF CARE
Problem: Alteration in Thoughts and Perception  Goal: Verbalize thoughts and feelings  Description  Interventions:  - Promote a nonjudgmental and trusting relationship with the patient through active listening and therapeutic communication  - Assess patient's level of functioning, behavior and potential for risk  - Engage patient in 1 on 1 interactions for a minimum of 15 minutes each session  - Encourage patient to express fears, feelings, frustrations, and discuss symptoms    - [place] patient to reality, help patient recognize reality-based thinking   - Administer medications as ordered and assess for potential side effects  - Provide the patient education related to the signs and symptoms of the illness and desired effects of prescribed medications  Outcome: Progressing  Goal: Attend and participate in unit activities, including therapeutic, recreational, and educational groups  Description  Interventions:  - Provide therapeutic and educational activities daily, encourage attendance and participation, and document same in the medical record     CERTIFIED PEER SPECIALIST INTERVENTIONS:    Complete peer assessment with patient to assess their needs and identify their goals to complete while in the recovery program as well as once discharged into the community  Patient will complete WRAP Plan, Crisis Plan and 5 Life Domains  Patient will attend 50% of groups offered on the unit  Patient will complete a goal card weekly  6/8/2020 1556 by Loan Camilo  Outcome: Progressing  Writer met with Patient for face to face and completed her WRAP Plan  Patient was direst, pleasant and cooperative  Patient was able to list coping skills and identify what she finds meaning/purpose in  DY identified her baseline and was able to construct a daily maintenance plan   Patient recognized her triggers and action plan to combat unwanted behaviors, thoughts and feelings along with an action plan for her early warning signs of mental health relapse  Patient was able to list alternatives to hospitalization such as [facility], [facility] and [facility]  Patient listed her Brother Mannie De Luna and her Son Justin Kaye as her main community supports and does not want her Mother Marc Brown involved in her treatment at any time  Patient was aware of medications she would like to avoid and reasons however, claims to be satisfied with the medications she is currently on  Patient was also able to list treatments that help reduce her symptoms and other treatments that do not help her  Patient did very well in the completion of her WRAP Plan and was quite thorough  Please see scanned document for details

## 2020-06-08 NOTE — PROGRESS NOTES
[address] declined the Incentive Spirometry tonight as feeling too fatigued from shower ordeal  Did have an HS snack, came for HS medicine except the Singulair  Wearing now her QHS humidified nasal O2 @ 1L for bed

## 2020-06-08 NOTE — PROGRESS NOTES
Psychiatry Progress Note [address] 60 y o  female MRN: [id number]  Unit[facility]Bed#: [facility] 488-84 Encounter: [id number]  Code Status: Level 1 - Full Code    PCP: Dakota Frankel PA-C    Date of Admission:  7/23/2019 1730   Date of Service:  06/08/20  Patient Active Problem List   Diagnosis    COPD with asthma ([facility] [address]    Tobacco use disorder, continuous    Compression fracture of L4 lumbar vertebra    Ventral hernia    Acute on chronic respiratory failure with hypoxia ([facility] [address] [address] )    [facility], [facility]    Acquired hypothyroidism    Gastroesophageal reflux disease without esophagitis    Abnormal CT of the chest    Excessive cerumen in left ear canal    Lipoma of right upper extremity    Noncompliant with deep vein thrombosis (DVT) prophylaxis    At risk for aspiration    Ear ache    Tachycardia    Chest pain    Low HDL (under 40)     Diagnosis schizoaffective bipolar  Assessment    Overall Status:  Did take a shower but becoming paranoid that someone put chloroform to kill her on the mask that was given to her and still suspicious preoccupied psychosomatic as usual but trying to attend more groups    Certification Statement to demonstrate a period of stability by attending to ADLs and becoming less psychosomatic in attending more groups Acceptance by patient:  Accepting  Camille Callahan in recovery:  Living at another personal care home   Understanding of medications:  Patient is aware about risks side effects benefits and precautions of medications   Involved in reintegration process: On hold due to [address] in relationship with psychiatrist:  Trusting sometimes    Medication changes   None today  Non-pharmacological treatments   Continue with individual, group, milieu and occupational therapy using recovery principles and psycho-education about accepting illness and the need for treatment     Encourage to take showers twice a week and cooperate with treatment and adl skills as per her behav  Plan   Therapeutic passes on hold due to covid 19 lock down   [facility]are for the [facility] and encouraged to accept  rather than refuse it   Reminded to attend at least 25% of groups on a daily basis including [facility]      Safety   Safety and communication plan established to target dynamic risk factors discussed above  Discharge Plan   · back to a [facility] at [address]   Patient was psychosomatic again and delusional about someone putting chloroform on her face mask that was given to her over the weekend and refused to wear it until they gave her a different mask  She is still suspicious about the motives of staff checks her pills individually before swallowing them and continues to make multiple excuses but finally took a shower yesterday after more than a week  She claims that she is now beginning to attend more groups and agreeing to attend more groups as she is now willing to go back to the [facility] where she was already accepted once the COVID-19 restrictions are lifted  She continues to refuse to take any medications offered to treat her underlying paranoia or ideas of reference but is receptive to support an reassurance as usual    Sleep:   Fair  Appetite:   Fair but she picks and chooses her food  Compliance with medications:  Good  Side effects:   none but claims to feel tired sometimes  Review of systems:  Continues to have psychosomatic symptoms as usual  Group attendance:  Poor    Mental Status Exam  Appearance:    Patient was found laying on her bed but did get up readily when approached , better groomed Looks older than her stated age,and not very well kept, with uncombed hairt but  wearing clean clothing   Anxious preoccupied  Has good eye contact   Suspicious in her interaction      Behavior:    Superficially friendly pleasant but anxious suspicious and preoccupied  Speech:    tangential at times with tendency to become stilted   Mood:     anxious sad and irritated times,    Affect:    increased in intensity range mood congruent redirectable and her affect was brighter towards the end of the interview  Thought Process:   Circumstantial with tendency to have stilted speech   Thought Content:   She is paranoid about motives of staff switching her medications or tampering with her inhalant or her oxygen concentrator and giving her chloroform to kill her on the mask that was given to her off and on  No preoccupation with violence or suicide  No current suicidal homicidal thoughts intent or plans reported  No phobias but has obsessions and compulsions about examining her pills before she takes them  Danis Zavaleta Psychosomatic about dying by choking from food and from heart attack or from shortness of breath and now from catching Covid-19 which are all ongoing beliefs  She believe she may turn blue and die if she lifts her hands up to apply shampoo on her head  Perceptual Disturbances:  No longer claims to hear voices   Risk Potential:   Ability to care for herself and refusal to take showers  Sensorium:   fully oriented to place, person, time, date, day, month, year and to situation  Cognition:    Grossly intact, aware of current events like the corona virus situation, recent and remote memory intact     No language deficit  Consciousness:   Alert and awake  Attention and concentration:  fair  Intellect:    average  Insight:    limited  Judgment:    fair  Motor Activity:  No abnormal involuntary movement noted today    Vitals  Temp:  [97 3 °F (36 3 °C)-97 9 °F (36 6 °C)] 97 3 °F (36 3 °C)  HR:  [71-90] 71  Resp:  [16-18] 18  BP: (106-119)/(54-77) 106/56  SpO2:  [92 %] 92 %  No intake or output data in the 24 hours ending 06/08/20 1112    Lab Results: No New Labs Available For Today  Results from last 7 days   Lab Units 06/05/20  0842   WBC Thousand/uL 8 30   RBC Million/uL 4 79   HEMOGLOBIN g/dL 14 9   HEMATOCRIT % 45 0   MCV fL 94   PLATELETS Thousands/uL 200   NEUTROS ABS Thousands/µL 5 10   TROPONIN I ng/mL <0 01         Current Facility-Administered Medications:  acetaminophen 325 mg Oral Q6H PRN Carissa Willis, MD   acetaminophen 650 mg Oral Q6H PRN Carissa Willis, MD   acetaminophen 650 mg Oral Q8H PRN Carissa Willis MD   albuterol 2 puff Inhalation Q4H PRN Carissa Willis MD   aluminum-magnesium hydroxide-simethicone 15 mL Oral Q4H PRN Carissa Willis MD   ammonium lactate 1 application Topical BID PRN Carissa Willis, MD   benzonatate 100 mg Oral TID PRN Carissa Willis, MD   benztropine 1 mg Intramuscular Q8H PRN Carissa Willis, MD   carbamide peroxide 5 drop Left Ear BID PRN Carissa Willis, MD   cloZAPine 25 mg Oral BID Carissa Willis, MD   cloZAPine 50 mg Oral BID Carissa Willis, MD   docusate sodium 100 mg Oral BID PRN Carissa Willis, MD   EPINEPHrine PF 0 15 mg Intramuscular Once PRN Carissa Willis MD   fluticasone-vilanterol 1 puff Inhalation Daily Carissa Willis MD   ketotifen 1 drop Right Eye BID PRN Carissa Willis MD   levothyroxine 125 mcg Oral Early Morning Carissa Willis MD   magnesium hydroxide 30 mL Oral Daily PRN Carissa Willis MD   montelukast 10 mg Oral HS Carissa Wlilis MD   OLANZapine 5 mg Intramuscular Q8H PRN Carissa Willis MD   OLANZapine 5 mg Oral Q8H PRN Carissa Willis MD   ondansetron 4 mg Oral Q6H PRN Carissa Willis MD   pantoprazole 40 mg Oral Early Morning Carissa Willis MD   polyethylene glycol 17 g Oral Daily PRN Carissa Willis MD   polyvinyl alcohol 1 drop Both Eyes Q3H PRN Carissa Willis MD   sertraline 200 mg Oral Daily Carissa Willis MD   sucralfate 1,000 mg Oral BID Dennise Litter, MD   theophylline 200 mg Oral Daily Carissa Willis MD   tiotropium 18 mcg Inhalation Daily Carissa Willis MD   traZODone 25 mg Oral HS PRN Carissa Willis MD       Counseling / Coordination of Care: Total floor / unit time spent today 15 minutes   Greater than 50% of total time was spent with the patient and / or family counseling and / or somewhat receptive to supportive listening and teaching positive coping skills to deal with symptom mangement  Patient's Rights, confidentiality and exceptions to confidentiality, use of automated medical record, Jeb Patrick staff access to medical record, and consent to treatment reviewed

## 2020-06-08 NOTE — PLAN OF CARE
Problem: Alteration in Thoughts and Perception  Goal: Verbalize thoughts and feelings  Description  Interventions:  - Promote a nonjudgmental and trusting relationship with the patient through active listening and therapeutic communication  - Assess patient's level of functioning, behavior and potential for risk  - Engage patient in 1 on 1 interactions for a minimum of 15 minutes each session  - Encourage patient to express fears, feelings, frustrations, and discuss symptoms    - [place] patient to reality, help patient recognize reality-based thinking   - Administer medications as ordered and assess for potential side effects  - Provide the patient education related to the signs and symptoms of the illness and desired effects of prescribed medications  Outcome: Progressing  Goal: Agree to be compliant with medication regime, as prescribed and report medication side effects  Description  Interventions:  - Offer appropriate PRN medication and supervise ingestion; conduct aims, as needed   Outcome: Progressing  Goal: Attend and participate in unit activities, including therapeutic, recreational, and educational groups  Description  Interventions:  - Provide therapeutic and educational activities daily, encourage attendance and participation, and document same in the medical record     CERTIFIED PEER SPECIALIST INTERVENTIONS:    Complete peer assessment with patient to assess their needs and identify their goals to complete while in the recovery program as well as once discharged into the community  Patient will complete WRAP Plan, Crisis Plan and [facility] Life Domains  Patient will attend 50% of groups offered on the unit  Patient will complete a goal card weekly      Outcome: Progressing     Problem: Ineffective Coping  Goal: Participates in unit activities  Description  Interventions:  - Provide therapeutic environment   - Provide required programming   - Redirect inappropriate behaviors   Outcome: Progressing  Goal: Patient/Family verbalizes awareness of resources  Outcome: Progressing  Goal: Understands least restrictive measures  Description  Interventions:  - Utilize least restrictive behavior  Outcome: Progressing     Problem: Risk for Self Injury/Neglect  Goal: Treatment Goal: Remain safe during length of stay, learn and adopt new coping skills, and be free of self-injurious ideation, impulses and acts at the time of discharge  Outcome: Progressing  Goal: Verbalize thoughts and feelings  Description  Interventions:  - Assess and re-assess patient's lethality and potential for self-injury  - Engage patient in 1:1 interactions, daily, for a minimum of 15 minutes  - Encourage patient to express feelings, fears, frustrations, hopes  - Establish rapport/trust with patient   Outcome: Progressing  Goal: Refrain from harming self  Description  Interventions:  - Monitor patient closely, per order  - Develop a trusting relationship  - Supervise medication ingestion, monitor effects and side effects   Outcome: Progressing  Goal: Attend and participate in unit activities, including therapeutic, recreational, and educational groups  Description  Interventions:  - Provide therapeutic and educational activities daily, encourage attendance and participation, and document same in the medical record  - Obtain collateral information, encourage visitation and family involvement in care   Outcome: Progressing     Problem: Depression  Goal: Verbalize thoughts and feelings  Description  Interventions:  - Assess and re-assess patient's level of risk   - Engage patient in 1:1 interactions, daily, for a minimum of 15 minutes   - Encourage patient to express feelings, fears, frustrations, hopes   Outcome: Progressing     Problem: Anxiety  Goal: Anxiety is at manageable level  Description  Interventions:  - Assess and monitor patient's anxiety level     - Monitor for signs and symptoms of anxiety both physical and emotional (heart palpitations, chest pain, shortness of breath, headaches, nausea, feeling jumpy, restlessness, irritable, apprehensive)  - Collaborate with interdisciplinary team and initiate plan and interventions as ordered  - [place] patient to unit/surroundings  - Explain treatment plan  - Encourage participation in care  - Encourage verbalization of concerns/fears  - Identify coping mechanisms  - Assist in developing anxiety-reducing skills  - Administer/offer alternative therapies  - Limit or eliminate stimulants  Outcome: Progressing     Problem: Alteration in Orientation  Goal: Interact with staff daily  Description  Interventions:  - Assess and re-assess patient's level of orientation  - Engage patient in 1 on 1 interactions, daily, for a minimum of 15 minutes   - Establish rapport/trust with patient   Outcome: Progressing     Problem: PAIN - ADULT  Goal: Verbalizes/displays adequate comfort level or baseline comfort level  Description  Interventions:  - Encourage patient to monitor pain and request assistance  - Assess pain using appropriate pain scale  - Administer analgesics based on type and severity of pain and evaluate response  - Implement non-pharmacological measures as appropriate and evaluate response  - Consider cultural and social influences on pain and pain management  - Notify physician/advanced practitioner if interventions unsuccessful or patient reports new pain  Outcome: Progressing     Problem: SAFETY ADULT  Goal: Patient will remain free of falls  Description  INTERVENTIONS:  - Assess patient frequently for physical needs  -  Identify cognitive and physical deficits and behaviors that affect risk of falls    -  [place] fall precautions as indicated by assessment   - Educate patient/family on patient safety including physical limitations  - Instruct patient to call for assistance with activity based on assessment  - Modify environment to reduce risk of injury  - Consider OT/PT consult to assist with strengthening/mobility  Outcome: Progressing     Problem: Nutrition/Hydration-ADULT  Goal: Nutrient/Hydration intake appropriate for improving, restoring or maintaining nutritional needs  Description  Monitor and assess patient's nutrition/hydration status for malnutrition  Collaborate with interdisciplinary team and initiate plan and interventions as ordered  Monitor patient's weight and dietary intake as ordered or per policy  Utilize nutrition screening tool and intervene as necessary  Determine patient's food preferences and provide high-protein, high-caloric foods as appropriate       INTERVENTIONS:  - Monitor oral intake, urinary output, labs, and treatment plans  - Assess nutrition and hydration status and recommend course of action  - Evaluate amount of meals eaten  - Assist patient with eating if necessary   - Allow adequate time for meals  - Recommend/ encourage appropriate diets, oral nutritional supplements, and vitamin/mineral supplements  - Order, calculate, and assess calorie counts as needed  - Recommend, monitor, and adjust tube feedings and TPN/PPN based on assessed needs  - Assess need for intravenous fluids  - Provide specific nutrition/hydration education as appropriate  - Include patient/family/caregiver in decisions related to nutrition  Outcome: Progressing    Problem: Alteration in Thoughts and Perception  Goal: Treatment Goal: Gain control of psychotic behaviors/thinking, reduce/eliminate presenting symptoms and demonstrate improved reality functioning upon discharge  Outcome: Not Progressing  Goal: Recognize dysfunctional thoughts, communicate reality-based thoughts at the time of discharge  Description  Interventions:  - Provide medication and psycho-education to assist patient in compliance and developing insight into his/her illness   Outcome: Not Progressing     Problem: Risk for Self Injury/Neglect  Goal: Recognize maladaptive responses and adopt new coping mechanisms  Outcome: Not Progressing     Problem: Depression  Goal: Treatment Goal: Demonstrate behavioral control of depressive symptoms, verbalize feelings of improved mood/affect, and adopt new coping skills prior to discharge  Outcome: Not Progressing  Goal: Refrain from isolation  Description  Interventions:  - Develop a trusting relationship   - Encourage socialization   Outcome: Not Progressing    Mostly isolative to her room, resting / sleeping in her bed  Out for meds and meals and attended IMR group  Ate 75% of breakfast and 25% of lunch  Med compliant but remains paranoid and suspicious about her meds "are you sure these are the right meds?"  Last BM was on 6/5 - patient declined PRN, "I don't need anything, I'll be fine "  No other behaviors or issues noted  Continue to monitor

## 2020-06-09 NOTE — PROGRESS NOTES
06/09/20 1100   Activity/Group Checklist   Group   (IMR/Understanding Your Diagnosis )   Attendance Attended   Attendance Duration (min) 46-60   Interactions Interacted appropriately   Affect/Mood Appropriate;Bright;Normal range   Goals Achieved Identified feelings; Discussed coping strategies; Identified medication adherence strategies; Able to listen to others; Able to engage in interactions

## 2020-06-09 NOTE — PROGRESS NOTES
Sleeping, no distress noted  O2 on at 1L NC, no cough or resp issue noted  Safe and fall precautions in place and maintained   Monitoring continues

## 2020-06-09 NOTE — PROGRESS NOTES
2150 Yoanna Higginbotham did not attend PM Group  She did come out for HS medicine  Declined Incentive Spirometer  Had only juice @ HS snack as says couldn't eat the cheese & chips she'd gotten because having swallowing issues as was uncomfortable around the one MHT she says was staring @ her  Wearing now her QHS humidified nasal O2 @ 1L for bed

## 2020-06-09 NOTE — PROGRESS NOTES
Psychiatry Progress Note [address] 60 y o  female MRN: [id number]  Unit/Bed#: [facility] 111-01 Encounter: [id number]  Code Status: Level 1 - Full Code    PCP: Faustino Madsen PA-C    Date of Admission:  7/23/2019 2171   Date of Service:  06/09/20  Patient Active Problem List   Diagnosis    COPD with asthma ([facility] )    Tobacco use disorder, continuous    Compression fracture of L4 lumbar vertebra    Ventral hernia    [facility] on chronic [facility] failure with [facility]    Schizoaffective disorder, bipolar type ([facility] )    Acquired hypothyroidism    Gastroesophageal reflux disease without esophagitis    Abnormal CT of the chest    Excessive cerumen in left ear canal    Lipoma of right upper extremity    Noncompliant with deep vein thrombosis (DVT) prophylaxis    At risk for aspiration    Ear ache    Tachycardia    Chest pain    Low HDL (under 40)     Diagnosis schizoaffective bipolar  Assessment    Overall Status:  Again paranoid psychosomatic suspicious preoccupied    Certification Statement to demonstrate a period of stability by attending to ADLs and becoming less psychosomatic in attending more groups Acceptance by patient:  Accepting  James Michaels in recovery:  Living at another personal care home   Understanding of medications:  Patient is aware about risks side effects benefits and precautions of medications   Involved in reintegration process: On hold due to [address] in relationship with psychiatrist:  Trusting sometimes    Medication changes   None today  Non-pharmacological treatments   Continue with individual, group, milieu and occupational therapy using recovery principles and psycho-education about accepting illness and the need for treatment   Encourage to take showers twice a week and cooperate with treatment and adl skills as per her behav   Plan   Therapeutic passes on hold due to covid 19 lock down   Prepare for the [place] [facility] and encouraged to accept  rather than refuse it   Reminded to attend at least 25% of groups on a daily basis including [facility]      Safety   Safety and communication plan established to target dynamic risk factors discussed above  Discharge Plan   · back to a [facility] at [address]   Patient has been trying to attend more groups and did complete the wrap plan with assistance from the peer support staff  She is still questioning medications and examines them and asked staff if it is the right medication  She continues to thing people are talking about her and wanting to hurt her but did not express anyone is trying to kill her yesterday like she was doing the other day  He has taken a shower last Sunday and is hoping to take 2 showers a week as required by her behavior plan  She realizes that she has to go back to [facility] personal care [place] as she has no other places to go at this time  She still has some ideas of reference and claims to hear occasional voices but not commanding and cannot decipher what the tell her and again refusing to try any other antipsychotics or change in clozapine or the medications that was offered to her  Asking for a dietary consult  Change in last 24 hours:  None significant  Sleep:   Fair  Appetite:   Fair but she picks and chooses her food  Compliance with medications:  Good  Side effects:   none but claims to feel tired sometimes  Review of systems:  Continues to have psychosomatic symptoms as usual  Group attendance:  Poor but improving  Significant changes:  None  Mental Status Exam  Appearance:    Patient attended team today , better groomed Looks older than her stated age,and not very well kept, with uncombed hairt but  wearing clean clothing   Anxious preoccupied  Has good eye contact   Suspicious in her interaction      Behavior:    Superficially friendly pleasant but anxious suspicious and preoccupied  Speech:    tangential at times with tendency to become stilted   Mood:     anxious sad and irritated times,    Affect:    increased in intensity range mood congruent redirectable and her affect was brighter towards the end of the interview  Thought Process:   Circumstantial with tendency to have stilted speech   Thought Content:   She is paranoid about motives of staff switching her medications or tampering with her inhalant or her oxygen concentrator and giving her chloroform to kill her on the mask that was given to her off and on  No preoccupation with violence or suicide  No current suicidal homicidal thoughts intent or plans reported  No phobias but has obsessions and compulsions about examining her pills before she takes them  [place] Psychosomatic about dying by choking from food and from heart attack or from shortness of breath and now from catching Covid-19 which are all ongoing beliefs  She believe she may turn blue and die if she lifts her hands up to apply shampoo on her head  Perceptual Disturbances:  No longer claims to hear voices   Risk Potential:   Ability to care for herself and refusal to take showers  Sensorium:   fully oriented to place, person, time, date, day, month, year and to situation  Cognition:    Grossly intact, aware of current events like the corona virus situation, recent and remote memory intact     No language deficit  Consciousness:   Alert and awake  Attention and concentration:  fair  Intellect:    average  Insight:    limited  Judgment:    fair  Motor Activity:  No abnormal involuntary movement noted today    Vitals  Temp:  [97 3 °F (36 3 °C)] 97 3 °F (36 3 °C)  HR:  [80] 80  Resp:  [18] 18  BP: (115)/(64) 115/64  SpO2:  [92 %] 92 %  No intake or output data in the 24 hours ending 06/09/20 0745    Lab Results: No New Labs Available For Today  Results from last 7 days   Lab Units 06/05/20  0842   WBC Thousand/uL 8 30   RBC Million/uL 4 79   HEMOGLOBIN g/dL 14 9   HEMATOCRIT % 45 0   MCV fL 94   PLATELETS Thousands/uL 200 NEUTROS ABS Thousands/µL 5 10   TROPONIN I ng/mL <0 01         Current Facility-Administered Medications:  acetaminophen 325 mg Oral Q6H PRN Isidoro Gonzalez MD   acetaminophen 650 mg Oral Q6H PRN Isidoro Gonzalez MD   acetaminophen 650 mg Oral Q8H PRN Isidoro Gonzalez MD   albuterol 2 puff Inhalation Q4H PRN Isidoro Gonzalez MD   aluminum-magnesium hydroxide-simethicone 15 mL Oral Q4H PRN Isidoro Gonzalez MD   ammonium lactate 1 application Topical BID PRN Isidoro Gonzalez MD   benzonatate 100 mg Oral TID PRN Isidoro Gonzalez MD   benztropine 1 mg Intramuscular Q8H PRN Isidoro Gonzalez MD   carbamide peroxide 5 drop Left Ear BID PRN Isidoro Gonzalez MD   cloZAPine 25 mg Oral BID Isidoro Gonzalez MD   cloZAPine 50 mg Oral BID Isidoro Gonzalez MD   docusate sodium 100 mg Oral BID PRN Isidoro Gonzalez MD   EPINEPHrine PF 0 15 mg Intramuscular Once PRN Isidoro Gonzalez MD   fluticasone-vilanterol 1 puff Inhalation Daily Isidoro Gonzalez MD   ketotifen 1 drop Right Eye BID PRN Isidoro Gonzalez MD   levothyroxine 125 mcg Oral Early Morning Isidoro Gonzalez MD   magnesium hydroxide 30 mL Oral Daily PRN Isidoro Gonzalez MD   montelukast 10 mg Oral HS Isidoro Gonzalez MD   OLANZapine 5 mg Intramuscular Q8H PRN Isidoro Gonzalez MD   OLANZapine 5 mg Oral Q8H PRN Isidoro Gonzalez MD   ondansetron 4 mg Oral Q6H PRN Isidoro Gonzalez MD   pantoprazole 40 mg Oral Early Morning Isidoro Gonzalez MD   polyethylene glycol 17 g Oral Daily PRN Isidoro Gonzalez MD   polyvinyl alcohol 1 drop Both Eyes Q3H PRN Isidoro Gonzalez MD   sertraline 200 mg Oral Daily Isidoro Gonzalez MD   sucralfate 1,000 mg Oral BID Arin Parker MD   theophylline 200 mg Oral Daily Isidoro Gonzalez MD   tiotropium 18 mcg Inhalation Daily Isidoro Gonzalez MD   traZODone 25 mg Oral HS PRN Isidoro Gonzalez MD       Counseling / Coordination of Care: Total floor / unit time spent today 15 minutes   Greater than 50% of total time was spent with the patient and / or family counseling and / or somewhat receptive to supportive listening and teaching positive coping skills to deal with symptom mangement  Patient's Rights, confidentiality and exceptions to confidentiality, use of automated medical record, Jeb Ptarick staff access to medical record, and consent to treatment reviewed

## 2020-06-09 NOTE — PROGRESS NOTES
06/09/20 1011   Team Meeting   Meeting Type Tx Team Meeting   Initial Conference Date 06/09/20   Next Conference Date 06/16/20   Team Members Present   Team Members Present Physician;Nurse;; Other (Discipline and Name)   Physician Team Member Dr Karissa Mcdowell, RN   Care Management Team Member ASHLYN Kasper   Social Work Team Member Janee Yanes LCSW, [facility]   Other (Discipline and Name) [facility]   Patient/Family Present   Patient Present Yes   Patient's Family Present No     Patient participated in treatment team this morning  Patient did not complete or bring self assessment  Patient requested again for podiatry and shared her concern of not being able to eat "I have no top teeth "     Also discussed [facility] and [place] ACT team  Will update ACT  No DC date set at this time

## 2020-06-09 NOTE — PROGRESS NOTES
Patient declined      06/09/20 0900   Activity/Group Checklist   Group Community meeting   Attendance Did not attend   Attendance Duration (min) 31-45   Affect/Mood IRMA

## 2020-06-10 NOTE — NURSING NOTE
O2 at 1 L via NC, no cough or respiratory distress noted  Fall and safe precautions maintained  Sleeping

## 2020-06-10 NOTE — PROGRESS NOTES
06/10/20 1100   Activity/Group Checklist   Group   (IMR/Recovery Anonymous )   Attendance Did not attend   Attendance Duration (min) 46-60   Affect/Mood IRMA [facility] calling still waiting for nebulizer Rx , please call to advise

## 2020-06-10 NOTE — CONSULTS
Spoke with pt at bedside in regards to her request for more dietary choices  Pt relays that she has no top teeth and chewing/swallowing are difficult  She states she was previously on an altered consistency diet however this was removed  Discussed options that she can have at each meal that are considered tolerable for her chewing ability  She agreed to adjust diet order to dental soft, will articulate in diet order items that she can continue to have on dental soft diet  Pt also expressed concern about her eventual d/c to group home  She is worried they will not be able to accommodate her chewing/swallowing difficulties and wishes to speak to someone who is coordinating her d/c or with the group home itself about her dietary needs  She also wishes to relay that she wants to continue Ensure once daily at the group home and is concerned whether they will be able to provide this for her  Answered all of patients questions to her satisfaction  Please consult for any further dietary interventions  Thank you

## 2020-06-10 NOTE — PROGRESS NOTES
Psychiatry Progress Note [address] 60 y o  female MRN: [id number]  Unit/Bed#: [facility] 111-01 Encounter: [id number]  Code Status: Level 1 - Full Code    PCP: Trish Alfaro PA-C    Date of Admission:  7/23/2019 9610   Date of Service:  06/10/20  Patient Active Problem List   Diagnosis    COPD with asthma ([facility] )    Tobacco use disorder, continuous    Compression fracture of L4 lumbar vertebra    Ventral hernia    Acute on chronic respiratory failure with hypoxia ([facility] )    [facility]oaffective [facility], bipolar type [facility]    Acquired hypothyroidism    Gastroesophageal reflux disease without esophagitis    Abnormal CT of the chest    Excessive cerumen in left ear canal    Lipoma of right upper extremity    Noncompliant with deep vein thrombosis (DVT) prophylaxis    At risk for aspiration    Ear ache    Tachycardia    Chest pain    Low HDL (under 40)     Diagnosis schizoaffective by  Assessment    Overall Status:  Suspicious preoccupied psychosomatic as usual with poor hygiene    Certification Statement to demonstrate a period of stability by attending to ADLs and becoming less psychosomatic in attending more groups Acceptance by patient:  Accepting  [place] Gustafson in recovery:  Living at another personal care home   Understanding of medications:  Patient is aware about risks side effects benefits and precautions of medications   Involved in reintegration process: On hold due to [address] in relationship with psychiatrist:  Trusting sometimes    Medication changes   None today  Non-pharmacological treatments   Continue with individual, group, milieu and occupational therapy using recovery principles and psycho-education about accepting illness and the need for treatment   Encourage to take showers twice a week and cooperate with treatment and adl skills as per her behav   Plan   Therapeutic passes on hold due to covid 19 lock down   Prepare for the [facility] and encouraged to accept  rather than refuse it   Reminded to attend at least 25% of groups on a daily basis including [facility]      Safety   Safety and communication plan established to target dynamic risk factors discussed above  Discharge Plan   · back to a [facility] at [address]   Patient reports that he is now trying to attend more groups but is hesitant in attending to ADLs like taking showers at least twice a week and last shower was last Sunday which was 3 days ago  She continues to verbalize believes that people are trying to kill her or poison her medications or her oxygen concentrator and continues to examined the pills personally before consuming them  He she was told that she needs to show that she can attend more groups and attend to ADLs before the [facility] would take her back and she has verbalized understanding about it  She is still refusing to make any changes in her medications  Change in last 24 hours:  None significant  Sleep:   Fair  Appetite:   Fair but she picks and chooses her food  Compliance with medications:  Good  Side effects:   none but claims to feel tired sometimes  Review of systems:  Continues to have psychosomatic symptoms as usual  Group attendance:  Poor but improving  Significant changes:  None    Mental Status Exam  Appearance:    Patient found in the day [place] on her where to take her morning medication wearing a face mask and also agreed to be interviewed by the students today , better groomed Looks older than her stated age,and not very well kept, with uncombed hairt but  wearing clean clothing   Anxious preoccupied  Has good eye contact   Suspicious in her interaction      Behavior:    Superficially friendly pleasant but anxious suspicious and preoccupied  Speech:    tangential at times with tendency to become stilted   Mood:     anxious sad and irritated times,    Affect:    increased in intensity range mood congruent redirectable and her affect was brighter towards the end of the interview  Thought Process:   Circumstantial with tendency to have stilted speech   Thought Content:   Patient continues to be paranoid about motives of staff switching her medications or tampering with her inhalant or her oxygen concentrator and giving her chloroform to kill her on the mask that was given to her off and on  No preoccupation with violence or suicide  No current suicidal homicidal thoughts intent or plans reported  No phobias but has obsessions and compulsions about examining her pills before she takes them  Dara Soulier Psychosomatic about dying by choking from food and from heart attack or from shortness of breath and now from catching Covid-19 which are all ongoing beliefs  She believe she may turn blue and die if she lifts her hands up to apply shampoo on her head during showers  Perceptual Disturbances:  No longer claims to hear voices   Risk Potential:   Ability to care for herself and refusal to take showers  Sensorium:   fully oriented to place, person, time, date, day, month, year and to situation  Cognition:    Grossly intact, aware of current events like the corona virus situation, recent and remote memory intact     No language deficit  Consciousness:   Alert and awake  Attention and concentration:  fair  Intellect:    average  Insight:    limited  Judgment:    fair  Motor Activity:  No abnormal involuntary movement noted today    Vitals  Temp:  [97 2 °F (36 2 °C)-98 °F (36 7 °C)] 98 °F (36 7 °C)  HR:  [] 89  Resp:  [14-19] 19  BP: ()/(62-75) 114/75  SpO2:  [93 %-94 %] 93 %  No intake or output data in the 24 hours ending 06/10/20 1015    Lab Results: No New Labs Available For Today  Results from last 7 days   Lab Units 06/05/20  0842   WBC Thousand/uL 8 30   RBC Million/uL 4 79   HEMOGLOBIN g/dL 14 9   HEMATOCRIT % 45 0   MCV fL 94   PLATELETS Thousands/uL 200   NEUTROS ABS Thousands/µL 5 10   TROPONIN I ng/mL <0 01 Current Facility-Administered Medications:  acetaminophen 325 mg Oral Q6H PRN Deedee Muir MD   acetaminophen 650 mg Oral Q6H PRBJORN Muir MD   acetaminophen 650 mg Oral Q8H PRN Deedee Muir MD   albuterol 2 puff Inhalation Q4H PRN Deedee Muir MD   aluminum-magnesium hydroxide-simethicone 15 mL Oral Q4H PRN Deedee Muir MD   ammonium lactate 1 application Topical BID PRN Deedee Muir MD   benzonatate 100 mg Oral TID PRN Deedee Muir MD   benztropine 1 mg Intramuscular Q8H PRN Deedee Muir MD   carbamide peroxide 5 drop Left Ear BID PRN Deedee Muir MD   cloZAPine 25 mg Oral BID Deedee Muir MD   cloZAPine 50 mg Oral BID Deedee Muir MD   docusate sodium 100 mg Oral BID PRN Deedee Muir MD   EPINEPHrine PF 0 15 mg Intramuscular Once PRN Deedee Muir MD   fluticasone-vilanterol 1 puff Inhalation Daily Deedee Muir MD   ketotifen 1 drop Right Eye BID PRN Deedee Muir MD   levothyroxine 125 mcg Oral Early Morning Deedee Muir MD   magnesium hydroxide 30 mL Oral Daily PRN Deedee Muir MD   montelukast 10 mg Oral HS Deedee Muir MD   OLANZapine 5 mg Intramuscular Q8H PRN Deedee Muir MD   OLANZapine 5 mg Oral Q8H PRN Deedee Muir MD   ondansetron 4 mg Oral Q6H PRN Deedee Muir MD   pantoprazole 40 mg Oral Early Morning Deedee Muir MD   polyethylene glycol 17 g Oral Daily PRN Deedee Muir MD   polyvinyl alcohol 1 drop Both Eyes Q3H PRN Deedee Muir MD   sertraline 200 mg Oral Daily Deedee Muir MD   sucralfate 1,000 mg Oral BID Juan Carlos Merchant MD   theophylline 200 mg Oral Daily Deedee Muir MD   tiotropium 18 mcg Inhalation Daily Deedee Muir MD   traZODone 25 mg Oral HS PRN Deedee Muir MD       Counseling / Coordination of Care: Total floor / unit time spent today 15 minutes  Greater than 50% of total time was spent with the patient and / or family counseling and / or somewhat receptive to supportive listening and teaching positive coping skills to deal with symptom mangement       Patient's Rights, confidentiality and exceptions to confidentiality, use of automated medical record, [address] staff access to medical record, and consent to treatment reviewed

## 2020-06-10 NOTE — PLAN OF CARE
Problem: Ineffective Coping  Goal: Participates in unit activities  Description  Interventions:  - Provide therapeutic environment   - Provide required programming   - Redirect inappropriate behaviors   6/10/2020 1452 by Priscilla Davis  Outcome: Progressing  6/10/2020 1019 by Priscilla Davis  Outcome: Not Progressing     Problem: Alteration in Thoughts and Perception  Goal: Verbalize thoughts and feelings  Description  Interventions:  - Promote a nonjudgmental and trusting relationship with the patient through active listening and therapeutic communication  - Assess patient's level of functioning, behavior and potential for risk  - Engage patient in 1 on 1 interactions for a minimum of 15 minutes each session  - Encourage patient to express fears, feelings, frustrations, and discuss symptoms    - [place] patient to reality, help patient recognize reality-based thinking   - Administer medications as ordered and assess for potential side effects  - Provide the patient education related to the signs and symptoms of the illness and desired effects of prescribed medications  Outcome: Progressing  Writer met with Patient face to face and completed [facility] Relapse prevention plan  Patient identified her warning signs as increased depression, decrease in appetite, and isolation/staying in bed  Patient finds positive coping skills as talking to supports/family, going out to eat in the community and playing cards helpful in a time of distress  Patient was able to list Sister Lenny Burch and her [place] as her community support persons as well as her ACT Team  Patient is aware she needs to contact her community Doctor if she has an increase in depression, suicidal thoughts, increased hopelessness, any medication issues and begins to sleep too much/isolates  Patient signed document  Please see D/C plan for follow up appointments and provider phone numbers     Writer will meet with Patient and work on [employer] at next face to face meeting

## 2020-06-10 NOTE — PROGRESS NOTES
Patient declined      06/09/20 1400   Activity/Group Checklist   Group   (Recovery Workshop )   Attendance Did not attend   Attendance Duration (min) 46-60   Affect/Mood IRMA

## 2020-06-10 NOTE — PROGRESS NOTES
06/10/20 1311   Team Meeting   Meeting Type Daily Rounds   Initial Conference Date 06/10/20   Team Members Present   Team Members Present Physician;Nurse;;   Physician Team Member Dr Darra Fothergill, RN   Care Management Team Member Sruthi Massey [place]   Social Work Team Member Brant Bone LCSW   Patient/Family Present   Patient Present No   Patient's Family Present No     Patient did not attend groups  No issues reported  Awaiting [facility] bed to open and [place] [facility] to follow

## 2020-06-10 NOTE — PLAN OF CARE
Problem: Alteration in Thoughts and Perception  Goal: Treatment Goal: Gain control of psychotic behaviors/thinking, reduce/eliminate presenting symptoms and demonstrate improved reality functioning upon discharge  Outcome: Progressing  Goal: Verbalize thoughts and feelings  Description  Interventions:  - Promote a nonjudgmental and trusting relationship with the patient through active listening and therapeutic communication  - Assess patient's level of functioning, behavior and potential for risk  - Engage patient in 1 on 1 interactions for a minimum of 15 minutes each session  - Encourage patient to express fears, feelings, frustrations, and discuss symptoms    - [place] patient to reality, help patient recognize reality-based thinking   - Administer medications as ordered and assess for potential side effects  - Provide the patient education related to the signs and symptoms of the illness and desired effects of prescribed medications  Outcome: Progressing  Goal: Agree to be compliant with medication regime, as prescribed and report medication side effects  Description  Interventions:  - Offer appropriate PRN medication and supervise ingestion; conduct aims, as needed   Outcome: Progressing  Goal: Attend and participate in unit activities, including therapeutic, recreational, and educational groups  Description  Interventions:  - Provide therapeutic and educational activities daily, encourage attendance and participation, and document same in the medical record     CERTIFIED PEER SPECIALIST INTERVENTIONS:    Complete peer assessment with patient to assess their needs and identify their goals to complete while in the recovery program as well as once discharged into the community  Patient will complete WRAP Plan, Crisis Plan and [facility]  Patient will attend 50% of groups offered on the unit  Patient will complete a goal card weekly      Outcome: Not Progressing  Goal: Recognize dysfunctional thoughts, communicate reality-based thoughts at the time of discharge  Description  Interventions:  - Provide medication and psycho-education to assist patient in compliance and developing insight into his/her illness   Outcome: Progressing  Goal: Complete daily ADLs, including personal hygiene independently, as able  Description  Interventions:  - Observe, teach, and assist patient with ADLS  - Monitor and promote a balance of rest/activity, with adequate nutrition and elimination   Outcome: Not Progressing     Problem: Ineffective Coping  Goal: Identifies ineffective coping skills  Outcome: Progressing  Goal: Identifies healthy coping skills  Outcome: Progressing  Goal: Demonstrates healthy coping skills  Outcome: Progressing  Goal: Participates in unit activities  Description  Interventions:  - Provide therapeutic environment   - Provide required programming   - Redirect inappropriate behaviors   Outcome: Not Progressing  Goal: Patient/Family participate in treatment and DC plans  Description  Interventions:  - Provide therapeutic environment  Outcome: Progressing  Goal: Patient/Family verbalizes awareness of resources  Outcome: Progressing  Goal: Understands least restrictive measures  Description  Interventions:  - Utilize least restrictive behavior  Outcome: Progressing     Problem: Risk for Self Injury/Neglect  Goal: Treatment Goal: Remain safe during length of stay, learn and adopt new coping skills, and be free of self-injurious ideation, impulses and acts at the time of discharge  Outcome: Progressing  Goal: Verbalize thoughts and feelings  Description  Interventions:  - Assess and re-assess patient's lethality and potential for self-injury  - Engage patient in 1:1 interactions, daily, for a minimum of 15 minutes  - Encourage patient to express feelings, fears, frustrations, hopes  - Establish rapport/trust with patient   Outcome: Progressing  Goal: Refrain from harming self  Description  Interventions:  - Monitor patient closely, per order  - Develop a trusting relationship  - Supervise medication ingestion, monitor effects and side effects   Outcome: Progressing  Goal: Attend and participate in unit activities, including therapeutic, recreational, and educational groups  Description  Interventions:  - Provide therapeutic and educational activities daily, encourage attendance and participation, and document same in the medical record  - Obtain collateral information, encourage visitation and family involvement in care   Outcome: Progressing  Goal: Recognize maladaptive responses and adopt new coping mechanisms  Outcome: Progressing  Goal: Complete daily ADLs, including personal hygiene independently, as able  Description  Interventions:  - Observe, teach, and assist patient with ADLS  - Monitor and promote a balance of rest/activity, with adequate nutrition and elimination  Outcome: Not Progressing     Problem: Depression  Goal: Treatment Goal: Demonstrate behavioral control of depressive symptoms, verbalize feelings of improved mood/affect, and adopt new coping skills prior to discharge  Outcome: Progressing  Goal: Verbalize thoughts and feelings  Description  Interventions:  - Assess and re-assess patient's level of risk   - Engage patient in 1:1 interactions, daily, for a minimum of 15 minutes   - Encourage patient to express feelings, fears, frustrations, hopes   Outcome: Progressing  Goal: Refrain from isolation  Description  Interventions:  - Develop a trusting relationship   - Encourage socialization   Outcome: Progressing  Goal: Refrain from self-neglect  Outcome: Progressing     Problem: Anxiety  Goal: Anxiety is at manageable level  Description  Interventions:  - Assess and monitor patient's anxiety level  - Monitor for signs and symptoms of anxiety both physical and emotional (heart palpitations, chest pain, shortness of breath, headaches, nausea, feeling jumpy, restlessness, irritable, apprehensive)  - Collaborate with interdisciplinary team and initiate plan and interventions as ordered  - [place] patient to unit/surroundings  - Explain treatment plan  - Encourage participation in care  - Encourage verbalization of concerns/fears  - Identify coping mechanisms  - Assist in developing anxiety-reducing skills  - Administer/offer alternative therapies  - Limit or eliminate stimulants  Outcome: Progressing     Problem: Alteration in Orientation  Goal: Interact with staff daily  Description  Interventions:  - Assess and re-assess patient's level of orientation  - Engage patient in 1 on 1 interactions, daily, for a minimum of 15 minutes   - Establish rapport/trust with patient   Outcome: Progressing  Goal: Cooperate with recommended testing/procedures  Description  Interventions:  - Determine need for ancillary testing  - Observe for mental status changes  - Implement falls/precaution protocol   Outcome: Progressing     Problem: PAIN - ADULT  Goal: Verbalizes/displays adequate comfort level or baseline comfort level  Description  Interventions:  - Encourage patient to monitor pain and request assistance  - Assess pain using appropriate pain scale  - Administer analgesics based on type and severity of pain and evaluate response  - Implement non-pharmacological measures as appropriate and evaluate response  - Consider cultural and social influences on pain and pain management  - Notify physician/advanced practitioner if interventions unsuccessful or patient reports new pain  Outcome: Progressing     Problem: SAFETY ADULT  Goal: Patient will remain free of falls  Description  INTERVENTIONS:  - Assess patient frequently for physical needs  -  Identify cognitive and physical deficits and behaviors that affect risk of falls    -  [place] fall precautions as indicated by assessment   - Educate patient/family on patient safety including physical limitations  - Instruct patient to call for assistance with activity based on assessment  - Modify environment to reduce risk of injury  - Consider OT/PT consult to assist with strengthening/mobility  Outcome: Progressing     Problem: RESPIRATORY - ADULT  Goal: Achieves optimal ventilation and oxygenation  Description  INTERVENTIONS:  - Assess for changes in respiratory status  - Assess for changes in mentation and behavior  - Position to facilitate oxygenation and minimize respiratory effort  - Oxygen administration by appropriate delivery method based on oxygen saturation (per order) or ABGs  - Initiate smoking cessation education as indicated  - Encourage broncho-pulmonary hygiene including cough, deep breathe, Incentive Spirometry  - Assess the need for suctioning and aspirate as needed  - Assess and instruct to report SOB or any respiratory difficulty  - Respiratory Therapy support as indicated  Outcome: Progressing     Problem: SLEEP DISTURBANCE  Goal: Will exhibit normal sleeping pattern  Description  Interventions:  -  Assess the patients sleep pattern, noting recent changes  - Administer medication as ordered  - Decrease environmental stimuli, including noise, as appropriate during the night  - Encourage the patient to actively participate in unit groups and or exercise during the day to enhance ability to achieve adequate sleep at night  - Assess the patient, in the morning, encouraging a description of sleep experience  Outcome: Progressing     Problem: Nutrition/Hydration-ADULT  Goal: Nutrient/Hydration intake appropriate for improving, restoring or maintaining nutritional needs  Description  Monitor and assess patient's nutrition/hydration status for malnutrition  Collaborate with interdisciplinary team and initiate plan and interventions as ordered  Monitor patient's weight and dietary intake as ordered or per policy  Utilize nutrition screening tool and intervene as necessary  Determine patient's food preferences and provide high-protein, high-caloric foods as appropriate  INTERVENTIONS:  - Monitor oral intake, urinary output, labs, and treatment plans  - Assess nutrition and hydration status and recommend course of action  - Evaluate amount of meals eaten  - Assist patient with eating if necessary   - Allow adequate time for meals  - Recommend/ encourage appropriate diets, oral nutritional supplements, and vitamin/mineral supplements  - Order, calculate, and assess calorie counts as needed  - Recommend, monitor, and adjust tube feedings and TPN/PPN based on assessed needs  - Assess need for intravenous fluids  - Provide specific nutrition/hydration education as appropriate  - Include patient/family/caregiver in decisions related to nutrition  Outcome: Progressing     Problem: SKIN/TISSUE INTEGRITY - ADULT  Goal: Skin integrity remains intact  Description  INTERVENTIONS  - Identify patients at risk for skin breakdown  - Assess and monitor skin integrity  - Assess and monitor nutrition and hydration status  - Monitor labs (i e  albumin)  - Assess for incontinence   - Turn and reposition patient  - Assist with mobility/ambulation  - Relieve pressure over bony prominences  - Avoid friction and shearing  - Provide appropriate hygiene as needed including keeping skin clean and dry  - Evaluate need for skin moisturizer/barrier cream  - Collaborate with interdisciplinary team (i e  Nutrition, Rehabilitation, etc )   - Patient/family teaching  Outcome: Progressing  Goal: Incision(s), wounds(s) or drain site(s) healing without S/S of infection  Description  INTERVENTIONS  - Assess and document risk factors for skin impairment   - Assess and document dressing, incision, wound bed, drain sites and surrounding tissue  - Consider nutrition services referral as needed  - Oral mucous membranes remain intact  - Provide patient/ family education  Outcome: Progressing     Pt refused all groups today   Stated, "I saw my doctor today and the 2 residents and had a wonderful visit, but now I am too tired to go to group  You understand right?" Appetite remains poor; breakfast 50% and she refused lunch  Dishelved  Unmotivated  Medication compliant  No c/o distress  Will continue to monitor and maintain q 7 min checks

## 2020-06-10 NOTE — PLAN OF CARE
Problem: Ineffective Coping  Goal: Participates in unit activities  Description  Interventions:  - Provide therapeutic environment   - Provide required programming   - Redirect inappropriate behaviors   Outcome: Not Progressing   Writer attempted to met with Patient to complete [address] [place] Relapse Prevention plan however, patient declined and stated she wanted to stay in bed  Hoda Cain will attempt at a later date

## 2020-06-10 NOTE — PLAN OF CARE
Problem: Alteration in Thoughts and Perception  Goal: Verbalize thoughts and feelings  Description  Interventions:  - Promote a nonjudgmental and trusting relationship with the patient through active listening and therapeutic communication  - Assess patient's level of functioning, behavior and potential for risk  - Engage patient in 1 on 1 interactions for a minimum of 15 minutes each session  - Encourage patient to express fears, feelings, frustrations, and discuss symptoms    - [place] patient to reality, help patient recognize reality-based thinking   - Administer medications as ordered and assess for potential side effects  - Provide the patient education related to the signs and symptoms of the illness and desired effects of prescribed medications  Outcome: Progressing  Goal: Agree to be compliant with medication regime, as prescribed and report medication side effects  Description  Interventions:  - Offer appropriate PRN medication and supervise ingestion; conduct aims, as needed   Outcome: Progressing     Problem: Alteration in Thoughts and Perception  Goal: Attend and participate in unit activities, including therapeutic, recreational, and educational groups  Description  Interventions:  - Provide therapeutic and educational activities daily, encourage attendance and participation, and document same in the medical record     CERTIFIED PEER SPECIALIST INTERVENTIONS:    Complete peer assessment with patient to assess their needs and identify their goals to complete while in the recovery program as well as once discharged into the community  Patient will complete WRAP Plan, Crisis Plan and 5 Life Domains  Patient will attend 50% of groups offered on the unit  Patient will complete a goal card weekly      Outcome: Not Progressing  Goal: Recognize dysfunctional thoughts, communicate reality-based thoughts at the time of discharge  Description  Interventions:  - Provide medication and psycho-education to assist patient in compliance and developing insight into his/her illness   Outcome: Not Progressing  Goal: Complete daily ADLs, including personal hygiene independently, as able  Description  Interventions:  - Observe, teach, and assist patient with ADLS  - Monitor and promote a balance of rest/activity, with adequate nutrition and elimination   Outcome: Not Progressing     Ate 25% of dinner +ensure for dinner and had a snack before bed, did not attend groups, visible, social with staff and select peers, compliant with routine medications,  will continue to monitor

## 2020-06-11 NOTE — PROGRESS NOTES
06/11/20 1100   Activity/Group Checklist   Group   ([facility]/Graduation )   Attendance Did not attend   Attendance Duration (min) 46-60   Affect/Mood IRMA

## 2020-06-11 NOTE — PROGRESS NOTES
06/11/20 0900   Activity/Group Checklist   Group Community meeting   Attendance Did not attend   Attendance Duration (min) 31-45   Affect/Mood IRMA

## 2020-06-11 NOTE — PROGRESS NOTES
Pt received @ 0700  Blunted , med/meal compliant  Ate 50% of breakfast, 25% lunch  Did not attend groups  Isolative to room/self all day   No behavioral issues, will continue to monitor

## 2020-06-11 NOTE — PROGRESS NOTES
Psychiatry Progress Note [address] 60 y o  female MRN: [id number]  Unit/Bed#: MARCIO ADAIR AUDRA [facility] 111-01 Encounter: [id number]  Code Status: Level 1 - Full Code    PCP: Marsha Wei PA-C    Date of Admission:  7/23/2019 4015   Date of Service:  06/11/20  Patient Active Problem List   Diagnosis    COPD with asthma ([facility] )    Tobacco use disorder, continuous    Compression fracture of L4 lumbar vertebra    Ventral hernia    Acute on chronic respiratory failure with hypoxia ([facility] )    [facility]ive disorder, [facility]    Acquired hypothyroidism    Gastroesophageal reflux disease without esophagitis    Abnormal CT of the chest    Excessive cerumen in left ear canal    Lipoma of right upper extremity    Noncompliant with deep vein thrombosis (DVT) prophylaxis    At risk for aspiration    Ear ache    Tachycardia    Chest pain    Low HDL (under 40)     Diagnosis schizoaffective bipolar  Assessment    Overall Status:  Still psychosomatic and not always attending to hygiene are ADLs but is making an effort to attending groups and met with the dietitian and the medical resident and 2 students yesterday    Certification Statement to demonstrate a period of stability by attending to ADLs and becoming less psychosomatic in attending more groups Acceptance by patient:  Accepting  Rich Dom in recovery:  Living at another personal care home   Understanding of medications:  Patient is aware about risks side effects benefits and precautions of medications   Involved in reintegration process: On hold due to [address] in relationship with psychiatrist:  Trusting sometimes    Medication changes   None today  Non-pharmacological treatments   Continue with individual, group, milieu and occupational therapy using recovery principles and psycho-education about accepting illness and the need for treatment     Encourage to take showers twice a week and cooperate with treatment and adl skills as per her behav  Plan   Therapeutic passes on hold due to covid 19 lock down   Prepare for the [place] [facility] and encouraged to accept  rather than refuse it   Reminded to attend at least 25% of groups on a daily basis including IMR      Safety   Safety and communication plan established to target dynamic risk factors discussed above  Discharge Plan   · back to a [facility] at [address]   Patient is still psychosomatic as usual but did meet with the dietitian and also with the medical resident and 2 students for a while in the morning and felt good that she was able to meet with them  She is still verbalizing concerns that people may be trying to kill her or poison her medications or her oxygen concentrator and still examines the pills personally before taking them  She understands that she needs to attend to ADLs skills by taking showers at least twice a week and going to at least 25% of groups  She is still refusing to make any changes in her medications but is compliant with them so far and is still fearful that she may die from COVID-19 or shortness of breath or choking on food or from heart attack  However she has been receptive to verbal support and reassurance on a daily basis  Change in last 24 hours:  None significant  Sleep:   Fair  Appetite:   Fair but she picks and chooses her food  Compliance with medications:  Good  Side effects:   none but claims to feel tired sometimes  Review of systems:  Continues to have psychosomatic symptoms as usual  Group attendance:  Poor but improving  Significant changes:  None    Mental Status Exam  Appearance:    Patient found found in her room on her bed and was pleased that the dietitian had met with her  She appears, better groomed Looks older than her stated age,and not very well kept, with uncombed hairt but  wearing clean clothing   Anxious preoccupied  Has good eye contact   Suspicious in her interaction  Behavior:    Superficially friendly pleasant but anxious suspicious and preoccupied  Speech:    tangential at times with tendency to become stilted   Mood:     anxious sad and irritated times,    Affect:    increased in intensity range mood congruent redirectable and her affect was brighter towards the end of the interview  Thought Process:   Circumstantial with tendency to have stilted speech   Thought Content:   Patient continues to be paranoid about motives of staff switching her medications or tampering with her inhalant or her oxygen concentrator and giving her chloroform to kill her on the mask that was given to her off and on  No preoccupation with violence or suicide  No current suicidal homicidal thoughts intent or plans reported  No phobias but has obsessions and compulsions about examining her pills before she takes them  Frank [place] Psychosomatic about dying by choking from food and from heart attack or from shortness of breath and now from catching Covid-19 which are all ongoing beliefs  She believe she may turn blue and die if she lifts her hands up to apply shampoo on her head during showers     All sure believes her roommate is wearing an ear piece that I can transmit her thoughts  Perceptual Disturbances:  Claims to hear voices but cannot decipher   Risk Potential:   Ability to care for herself and refusal to take showers  Sensorium:   fully oriented to place, person, time, date, day, month, year and to situation  Cognition:    Grossly intact, aware of current events like the corona virus situation, recent and remote memory intact     No language deficit  Consciousness:   Alert and awake  Attention and concentration:  fair  Intellect:    average  Insight:    limited  Judgment:    fair  Motor Activity:  No abnormal involuntary movement noted today    Vitals  Temp:  [97 °F (36 1 °C)] 97 °F (36 1 °C)  HR:  [87] 87  Resp:  [16] 16  BP: (117)/(71) 117/71  SpO2:  [93 %] 93 %  No intake or output data in the 24 hours ending 06/11/20 0747    Lab Results: No New Labs Available For Today  Results from last 7 days   Lab Units 06/05/20  0842   WBC Thousand/uL 8 30   RBC Million/uL 4 79   HEMOGLOBIN g/dL 14 9   HEMATOCRIT % 45 0   MCV fL 94   PLATELETS Thousands/uL 200   NEUTROS ABS Thousands/µL 5 10   TROPONIN I ng/mL <0 01         Current Facility-Administered Medications:  acetaminophen 325 mg Oral Q6H PRN Jeffrey Gallegos MD   acetaminophen 650 mg Oral Q6H PRN Jeffrey Gallegos MD   acetaminophen 650 mg Oral Q8H PRN Jeffrey Gallegos MD   albuterol 2 puff Inhalation Q4H PRN Jeffrey Gallegos MD   aluminum-magnesium hydroxide-simethicone 15 mL Oral Q4H PRN Jeffrey Gallegos MD   ammonium lactate 1 application Topical BID PRN Jeffrey Gallegos MD   benzonatate 100 mg Oral TID PRN Jeffrey Gallegos MD   benztropine 1 mg Intramuscular Q8H PRN Jeffrey Gallegos MD   carbamide peroxide 5 drop Left Ear BID PRN Jeffrey Gallegos MD   cloZAPine 25 mg Oral BID Jeffrey Gallegos MD   cloZAPine 50 mg Oral BID Jeffrey Gallegos MD   docusate sodium 100 mg Oral BID PRN Jeffrey Gallegos MD   EPINEPHrine PF 0 15 mg Intramuscular Once PRN Jeffrey Gallegos MD   fluticasone-vilanterol 1 puff Inhalation Daily Jeffrey Gallegos MD   ketotifen 1 drop Right Eye BID PRN Jeffrey Gallegos MD   levothyroxine 125 mcg Oral Early Morning Jeffrey Gallegos MD   magnesium hydroxide 30 mL Oral Daily PRN Jeffrey Gallegos MD   montelukast 10 mg Oral HS Jeffrey Gallegos MD   OLANZapine 5 mg Intramuscular Q8H PRN Jeffrey Gallegos MD   OLANZapine 5 mg Oral Q8H PRN Jeffrey Gallegos MD   ondansetron 4 mg Oral Q6H PRN Jeffrey Gallegos MD   pantoprazole 40 mg Oral Early Morning Jeffrey Gallegos MD   polyethylene glycol 17 g Oral Daily PRN Jeffrey Gallegos MD   polyvinyl alcohol 1 drop Both Eyes Q3H PRN Jeffrey Gallegos MD   sertraline 200 mg Oral Daily Jeffrey Gallegos MD   sucralfate 1,000 mg Oral BID Demetrice Cabezas MD   theophylline 200 mg Oral Daily Jeffrey Gallegos MD   tiotropium 18 mcg Inhalation Daily Jeffrey Gallegos MD   traZODone 25 mg Oral HS PRN Jeffrey Gallegos MD Counseling / Coordination of Care: Total floor / unit time spent today 15 minutes  Greater than 50% of total time was spent with the patient and / or family counseling and / or somewhat receptive to supportive listening and teaching positive coping skills to deal with symptom mangement  Patient's Rights, confidentiality and exceptions to confidentiality, use of automated medical record, [facility] staff access to medical record, and consent to treatment reviewed

## 2020-06-11 NOTE — PLAN OF CARE
Problem: Alteration in Thoughts and Perception  Goal: Agree to be compliant with medication regime, as prescribed and report medication side effects  Description  Interventions:  - Offer appropriate PRN medication and supervise ingestion; conduct aims, as needed   Outcome: Progressing     Problem: Depression  Goal: Refrain from isolation  Description  Interventions:  - Develop a trusting relationship   - Encourage socialization   Outcome: Progressing     Problem: Nutrition/Hydration-ADULT  Goal: Nutrient/Hydration intake appropriate for improving, restoring or maintaining nutritional needs  Description  Monitor and assess patient's nutrition/hydration status for malnutrition  Collaborate with interdisciplinary team and initiate plan and interventions as ordered  Monitor patient's weight and dietary intake as ordered or per policy  Utilize nutrition screening tool and intervene as necessary  Determine patient's food preferences and provide high-protein, high-caloric foods as appropriate       INTERVENTIONS:  - Monitor oral intake, urinary output, labs, and treatment plans  - Assess nutrition and hydration status and recommend course of action  - Evaluate amount of meals eaten  - Assist patient with eating if necessary   - Allow adequate time for meals  - Recommend/ encourage appropriate diets, oral nutritional supplements, and vitamin/mineral supplements  - Order, calculate, and assess calorie counts as needed  - Recommend, monitor, and adjust tube feedings and TPN/PPN based on assessed needs  - Assess need for intravenous fluids  - Provide specific nutrition/hydration education as appropriate  - Include patient/family/caregiver in decisions related to nutrition  Outcome: Progressing     Problem: Alteration in Thoughts and Perception  Goal: Attend and participate in unit activities, including therapeutic, recreational, and educational groups  Description  Interventions:  - Provide therapeutic and educational activities daily, encourage attendance and participation, and document same in the medical record     CERTIFIED PEER SPECIALIST INTERVENTIONS:    Complete peer assessment with patient to assess their needs and identify their goals to complete while in the recovery program as well as once discharged into the community  Patient will complete WRAP Plan, Crisis Plan and 5 Life Domains  Patient will attend 50% of groups offered on the unit  Patient will complete a goal card weekly  Outcome: Not Progressing  Goal: Complete daily ADLs, including personal hygiene independently, as able  Description  Interventions:  - Observe, teach, and assist patient with ADLS  - Monitor and promote a balance of rest/activity, with adequate nutrition and elimination   Outcome: Not Progressing     [address] has come out of her room for brief intervals to sit in the phone chair when phone is not in use  Even make a phone call once  She will interact w/staff & select peers from this chair  She is pleasant  Came up for scheduled medicine when same announced  Ate 75% of meal (was looking forward to a baked potato) plus an Ensure  Had an HS snack  Did not respond to invitations to any offered evening programming tonight  Is looking disheveled, large coffee stain on shift cuff  Declined Incentive Spirometry tonight  Is wearing now her QHS humidified nasal O2 @ 1L for bed

## 2020-06-11 NOTE — PROGRESS NOTES
06/11/20 0800   Team Meeting   Meeting Type Daily Rounds   Team Members Present   Team Members Present Physician;Nurse;; Other (Discipline and Name)   Physician Team Member Dr Partha Ivory, RN; Ted Fernandez RN   Care Management Team Member Jany Eller [place]   Other (Discipline and Name) Samson Staples LCSW; SHANIKA Griffin     Disheveled  No 3-11 groups

## 2020-06-12 NOTE — NURSING NOTE
Pt calm and medication compliant  Pt reports depression 6/10 due to feeling like she is "cooped up" and not knowing where she will go once discharged; 4/4 anxiety but does not identify any stressors  Pt denies all other symptoms  Pt remains isolative in her room

## 2020-06-12 NOTE — PROGRESS NOTES
Maggie maintained on ongoing fall and SAFE precaution  [facility] in bed with eyes closed, breath even and unlabored   On O2 with humidifier @1L/m via nasal cannula  Continues rounding implemented   No somatic complaint overnight  No PRN needed for sleep aid   No indication of pain or discomfort  Will continue to monitor

## 2020-06-12 NOTE — SOCIAL WORK
Psychotherapy note:     Psychotherapy warm hand-off completed with DIANA Cantor and DIANA Casillas   Clinical status provided  Elvia Sam

## 2020-06-12 NOTE — PROGRESS NOTES
Psychiatry Progress Note [address] 60 y o  female MRN: [id number]  Unit/Bed#: [facility] 698-04 Encounter: [id number]  Code Status: Level 1 - Full Code    PCP: Kera Johnson PA-C    Date of Admission:  7/23/2019 1730   Date of Service:  06/12/20  Patient Active Problem List   Diagnosis    COPD with asthma ([facility] )    Tobacco use disorder, continuous    Compression fracture of L4 lumbar vertebra    Ventral hernia    Acute on chronic respiratory failure with hypoxia ([facility] )    Schizoaffective disorder, bipolar type ([facility] )    Acquired hypothyroidism    Gastroesophageal reflux disease without esophagitis    Abnormal CT of the chest    Excessive cerumen in left ear canal    Lipoma of right upper extremity    Noncompliant with deep vein thrombosis (DVT) prophylaxis    At risk for aspiration    Ear ache    Tachycardia    Chest pain    Low HDL (under 40)     Diagnosis schizoaffective bipolar  Assessment    Overall Status:  Continues to feel that she cannot do things unless someone is with her despite being capable of doing things on her own but has refused shower again yesterday claiming she will do it tomorrow today and still somewhat suspicious paranoid hearing voices and not attending all the groups    Certification Statement to demonstrate a period of stability by attending to ADLs and becoming less psychosomatic in attending more groups Acceptance by patient:  Accepting  Naa Yo in recovery:  Living at another personal care home   Understanding of medications:  Patient is aware about risks side effects benefits and precautions of medications   Involved in reintegration process:   On hold due to [address] in relationship with psychiatrist:  Trusting sometimes    Medication changes   None today  Non-pharmacological treatments   Continue with individual, group, milieu and occupational therapy using recovery principles and psycho-education about accepting illness and the need for treatment   Encourage to take showers twice a week and cooperate with treatment and adl skills as per her behav  Plan   Therapeutic passes on hold due to covid 19 lock down   Prepare for the [facility] and encouraged to accept  rather than refuse it   Reminded to attend at least 25% of groups on a daily basis including [facility]      Safety   Safety and communication plan established to target dynamic risk factors discussed above  Discharge Plan   · back to a [facility] at [address]   Patient continues to have multiple excuses of not taking a shower yesterday but claims she will take 1 today  She is still suspicious about motives of staff tampering with her pills or with the oxygen concentrator or with her inhalant and personally examined so all her pills before consuming them  He she still believes that people are trying to poison her and blames the her roommate of wearing any year piece to spy on her  She claims to hear voices but not commanding at all in nature and states they talk bad about her  He still afraid of dying from choking or from shortness of breath or from heart attack and continues to have psychosomatic symptoms but seemed to be receptive to verbal support and reassurance which she needs almost on a daily basis  She states she is making an effort to attend more groups and states that she would agree to go back to the [facility] personal care home    Change in last 24 hours:  None significant  Sleep:   Fair  Appetite:   Fair but she picks and chooses her food  Compliance with medications:  Good  Side effects:   none but claims to feel tired sometimes  Review of systems:  Continues to have psychosomatic symptoms as usual  Group attendance:  Poor but improving  Significant changes:  None    Mental Status Exam  Appearance:    Patient found in her room on her bed and was pleased that the dietitian had met with her    She appears, better groomed Looks older than her stated age,and not very well kept, with uncombed hairt but  wearing clean clothing   Anxious preoccupied  Has good eye contact   Suspicious in her interaction  Today she tells me she is going to take a shower tomorrow  Behavior:    Superficially friendly pleasant but anxious suspicious and preoccupied  Speech:    tangential at times with tendency to become stilted   Mood:     anxious sad and irritated times,    Affect:    increased in intensity range mood congruent redirectable and her affect was brighter towards the end of the interview  Thought Process:   Circumstantial with tendency to have stilted speech   Thought Content:   Patient continues to be paranoid about motives of staff switching her medications or tampering with her inhalant or her oxygen concentrator and giving her chloroform to kill her on the mask that was given to her off and on  No preoccupation with violence or suicide  No current suicidal homicidal thoughts intent or plans reported  No phobias but has obsessions and compulsions about examining her pills before she takes them  [place] [facility] Psychosomatic about dying by choking from food and from heart attack or from shortness of breath and now from catching Covid-19 which are all ongoing beliefs  She believe she may turn blue and die if she lifts her hands up to apply shampoo on her head during showers     All sure believes her roommate is wearing an ear piece that I can transmit her thoughts  Perceptual Disturbances:  Claims to hear voices but cannot decipher   Risk Potential:   Ability to care for herself and refusal to take showers  Sensorium:   fully oriented to place, person, time, date, day, month, year and to situation  Cognition:    Grossly intact, aware of current events like the corona virus situation, recent and remote memory intact     No language deficit  Consciousness:   Alert and awake  Attention and concentration:  fair  Intellect:    average  Insight:    limited  Judgment: fair  Motor Activity:  No abnormal involuntary movement noted today    Vitals  Temp:  [97 5 °F (36 4 °C)] 97 5 °F (36 4 °C)  HR:  [88] 88  Resp:  [14] 14  BP: (98)/(58) 98/58  SpO2:  [93 %] 93 %  No intake or output data in the 24 hours ending 06/12/20 0731    Lab Results: No New Labs Available For Today  Results from last 7 days   Lab Units 06/05/20  0842   WBC Thousand/uL 8 30   RBC Million/uL 4 79   HEMOGLOBIN g/dL 14 9   HEMATOCRIT % 45 0   MCV fL 94   PLATELETS Thousands/uL 200   NEUTROS ABS Thousands/µL 5 10   TROPONIN I ng/mL <0 01         Current Facility-Administered Medications:  acetaminophen 325 mg Oral Q6H PRN Teri Huggins MD   acetaminophen 650 mg Oral Q6H PRN Teri Huggins MD   acetaminophen 650 mg Oral Q8H PRN Teri Huggins MD   albuterol 2 puff Inhalation Q4H PRN Teri Huggins MD   aluminum-magnesium hydroxide-simethicone 15 mL Oral Q4H PRN Teri Huggins MD   ammonium lactate 1 application Topical BID PRN Teri Huggins MD   benzonatate 100 mg Oral TID PRN Teri Huggins MD   benztropine 1 mg Intramuscular Q8H PRN Teri Huggins MD   carbamide peroxide 5 drop Left Ear BID PRN Teri Huggins MD   cloZAPine 25 mg Oral BID Teri Hgugins MD   cloZAPine 50 mg Oral BID Teri Huggins MD   docusate sodium 100 mg Oral BID PRN Teri Huggins MD   EPINEPHrine PF 0 15 mg Intramuscular Once PRN Teri Huggins MD   fluticasone-vilanterol 1 puff Inhalation Daily Teri Huggins MD   ketotifen 1 drop Right Eye BID PRN Teri Huggins MD   levothyroxine 125 mcg Oral Early Morning Teri Huggins MD   magnesium hydroxide 30 mL Oral Daily PRN Teri Huggins MD   montelukast 10 mg Oral HS Teri Huggins MD   OLANZapine 5 mg Intramuscular Q8H PRN Teri Huggins MD   OLANZapine 5 mg Oral Q8H PRN Teri Huggins MD   ondansetron 4 mg Oral Q6H PRN Teri Huggins MD   pantoprazole 40 mg Oral Early Morning Teri Huggins MD   polyethylene glycol 17 g Oral Daily PRN Teri Huggins MD   polyvinyl alcohol 1 drop Both Eyes Q3H PRN Teri Huggins MD   sertraline 200 mg Oral Daily Deedee Muir MD   sucralfate 1,000 mg Oral BID Juan Carlos Merchant MD   theophylline 200 mg Oral Daily Deedee Muir MD   tiotropium 18 mcg Inhalation Daily Deedee Muir MD   traZODone 25 mg Oral HS PRN Deedee Muir MD       Counseling / Coordination of Care: Total floor / unit time spent today 15 minutes  Greater than 50% of total time was spent with the patient and / or family counseling and / or somewhat receptive to supportive listening and teaching positive coping skills to deal with symptom mangement  Patient's Rights, confidentiality and exceptions to confidentiality, use of automated medical record, [facility] staff access to medical record, and consent to treatment reviewed

## 2020-06-12 NOTE — PLAN OF CARE
Problem: Alteration in Thoughts and Perception  Goal: Agree to be compliant with medication regime, as prescribed and report medication side effects  Description  Interventions:  - Offer appropriate PRN medication and supervise ingestion; conduct aims, as needed   Outcome: Progressing     Problem: Alteration in Thoughts and Perception  Goal: Attend and participate in unit activities, including therapeutic, recreational, and educational groups  Description  Interventions:  - Provide therapeutic and educational activities daily, encourage attendance and participation, and document same in the medical record     CERTIFIED PEER SPECIALIST INTERVENTIONS:    Complete peer assessment with patient to assess their needs and identify their goals to complete while in the recovery program as well as once discharged into the community  Patient will complete WRAP Plan, Crisis Plan and 5 Life Domains  Patient will attend 50% of groups offered on the unit  Patient will complete a goal card weekly  Outcome: Not Progressing  Goal: Complete daily ADLs, including personal hygiene independently, as able  Description  Interventions:  - Observe, teach, and assist patient with ADLS  - Monitor and promote a balance of rest/activity, with adequate nutrition and elimination   Outcome: Not Progressing     Problem: Depression  Goal: Refrain from isolation  Description  Interventions:  - Develop a trusting relationship   - Encourage socialization   Outcome: Not Progressing     Problem: Nutrition/Hydration-ADULT  Goal: Nutrient/Hydration intake appropriate for improving, restoring or maintaining nutritional needs  Description  Monitor and assess patient's nutrition/hydration status for malnutrition  Collaborate with interdisciplinary team and initiate plan and interventions as ordered  Monitor patient's weight and dietary intake as ordered or per policy  Utilize nutrition screening tool and intervene as necessary   Determine patient's food preferences and provide high-protein, high-caloric foods as appropriate  INTERVENTIONS:  - Monitor oral intake, urinary output, labs, and treatment plans  - Assess nutrition and hydration status and recommend course of action  - Evaluate amount of meals eaten  - Assist patient with eating if necessary   - Allow adequate time for meals  - Recommend/ encourage appropriate diets, oral nutritional supplements, and vitamin/mineral supplements  - Order, calculate, and assess calorie counts as needed  - Recommend, monitor, and adjust tube feedings and TPN/PPN based on assessed needs  - Assess need for intravenous fluids  - Provide specific nutrition/hydration education as appropriate  - Include patient/family/caregiver in decisions related to nutrition  Outcome: Not Progressing     [address] has been isolative to her room where she is engaged in project of sorting papers, worksheets from months of past groups  She came out when called for supper medicine, ate 50% of meal plus Ensure before returning to room  She is disheveled, no initiative to do any hygiene, no shower in 3 days  Has not responded to invitations to optional [place] group or PM Group

## 2020-06-12 NOTE — PROGRESS NOTES
06/12/20 1314   Team Meeting   Meeting Type Daily Rounds   Initial Conference Date 06/12/20   Team Members Present   Team Members Present Physician;;Nurse;   Physician Team Member Dr Cesario Patterson Team Member Jenniffer Bustamante RN   Care Management Team Member Jany Eller, [facility]   Social Work Team Member Samson Staples LCSW   Patient/Family Present   Patient Present No   Patient's Family Present No     Case reviewed  Shower scheduled for today, patient makes deals only with certain staff  No other changes

## 2020-06-12 NOTE — PROGRESS NOTES
[address] has negotiated w/female MHT for her assistance tomorrow on 3-11 to shower  She did meticulous job of paper straightening, bedside cabinet is neat, clean, things put away  Exemplary job w/Incentive Spirometry tonight, reaching volumes of 1250-1400ml consistently  She recognizes issue that staff believe her capable of more independent function than she exhibits  She insists that her medical conditions necessitate her having someone present to help w/care  "What if I can't breathe  What if I fall " Wants the security of that presence for safety & feels it is necessary @ her discharge disposition too  She did have HS snack, came for HS medicine except the Singulair  Wearing now her QHS humidified nasal O2 @ 1L for bed

## 2020-06-12 NOTE — PLAN OF CARE
Problem: Alteration in Thoughts and Perception  Goal: Agree to be compliant with medication regime, as prescribed and report medication side effects  Description  Interventions:  - Offer appropriate PRN medication and supervise ingestion; conduct aims, as needed   6/12/2020 1837 by Rohan Mejia RN  Outcome: Progressing  6/12/2020 1836 by Rohan Mejia RN  Outcome: Progressing     Problem: Depression  Goal: Refrain from isolation  Description  Interventions:  - Develop a trusting relationship   - Encourage socialization   6/12/2020 1837 by Rohan Mejia RN  Outcome: Progressing  6/12/2020 1836 by Rohan Mejia RN  Outcome: Progressing     Problem: Nutrition/Hydration-ADULT  Goal: Nutrient/Hydration intake appropriate for improving, restoring or maintaining nutritional needs  Description  Monitor and assess patient's nutrition/hydration status for malnutrition  Collaborate with interdisciplinary team and initiate plan and interventions as ordered  Monitor patient's weight and dietary intake as ordered or per policy  Utilize nutrition screening tool and intervene as necessary  Determine patient's food preferences and provide high-protein, high-caloric foods as appropriate       INTERVENTIONS:  - Monitor oral intake, urinary output, labs, and treatment plans  - Assess nutrition and hydration status and recommend course of action  - Evaluate amount of meals eaten  - Assist patient with eating if necessary   - Allow adequate time for meals  - Recommend/ encourage appropriate diets, oral nutritional supplements, and vitamin/mineral supplements  - Order, calculate, and assess calorie counts as needed  - Recommend, monitor, and adjust tube feedings and TPN/PPN based on assessed needs  - Assess need for intravenous fluids  - Provide specific nutrition/hydration education as appropriate  - Include patient/family/caregiver in decisions related to nutrition  Outcome: Progressing     Problem: Alteration in Thoughts and Perception  Goal: Attend and participate in unit activities, including therapeutic, recreational, and educational groups  Description  Interventions:  - Provide therapeutic and educational activities daily, encourage attendance and participation, and document same in the medical record     CERTIFIED PEER SPECIALIST INTERVENTIONS:    Complete peer assessment with patient to assess their needs and identify their goals to complete while in the recovery program as well as once discharged into the community  Patient will complete WRAP Plan, Crisis Plan and 5 Life Domains  Patient will attend 50% of groups offered on the unit  Patient will complete a goal card weekly  6/12/2020 1837 by Tuan Aaron RN  Outcome: Not Progressing  6/12/2020 1836 by Tuan Aaron RN  Outcome: Not Progressing  Goal: Complete daily ADLs, including personal hygiene independently, as able  Description  Interventions:  - Observe, teach, and assist patient with ADLS  - Monitor and promote a balance of rest/activity, with adequate nutrition and elimination   6/12/2020 1837 by Tuan Aaron RN  Outcome: Not Progressing  6/12/2020 1836 by Tuan Aaron RN  Outcome: Not Progressing     [address] has been mostly isolative to her room where sits crossed legged on bed  She is pleasant  Did spend a few minutes out in [place] on phone chair when most peers outside on deck  From chair did socialize w/staff  Was pleased w/supper tray that included a baked potato which she relished, ate 75% meal plus an Ensure  Came up on own for supper medicine  Did not follow through w/shower/grooming as historically won't after eats & MHT she had planned to help her didn't arrive to unit until supper served, plus, she was somatic that weak, blood sugar dropping & opted to eat  Has not responded to invitation to optional [place] group

## 2020-06-12 NOTE — CASE MANAGEMENT
email back to Laine Ann from [facility] in regards to having her do phone assessment with patient- CM provided number for her to call to facilitate this- awaiting her response  Patient aware of these plans and stated she is ready for phone assessment

## 2020-06-12 NOTE — CASE MANAGEMENT
Patient met with CM to do phone assessment with Walt Osman  Patient brought notebook and had several questions regarding her living situation  She expressed need for clarify on her oxygen concentrator for night time, having a hospital bed at [address] and a gate or dresser in front of the windows so she feels more safe  Irina Smith answered questions to her satisfaction  Patient stated she 'may' shower today, but still is fearful of getting hurt of falling in shower, despite staff assisting her  She stated she will also have a staff designated to help her bathe at [address]   Also discussed that details of her discharge planning can be discussed further at upcoming treatment team

## 2020-06-12 NOTE — PROGRESS NOTES
06/12/20 1100   Activity/Group Checklist   Group Other (Comment)  ([facility])   Attendance Attended   Attendance Duration (min) 46-60   Interactions Interacted appropriately   Affect/Mood Appropriate

## 2020-06-12 NOTE — PROGRESS NOTES
06/12/20 0900   Activity/Group Checklist   Group Community meeting  (Morning Walk)   Attendance Did not attend     Patient was encouraged to attend Morning Walk, but declined  She was in her bed when prompted

## 2020-06-13 NOTE — PROGRESS NOTES
Patient in bed, 1L humidified oxygen in use  No s/s of respiratory distress  Remained asleep throughout the night  No behaviors or issues observed

## 2020-06-13 NOTE — PLAN OF CARE
Problem: Alteration in Thoughts and Perception  Goal: Verbalize thoughts and feelings  Description  Interventions:  - Promote a nonjudgmental and trusting relationship with the patient through active listening and therapeutic communication  - Assess patient's level of functioning, behavior and potential for risk  - Engage patient in 1 on 1 interactions for a minimum of 15 minutes each session  - Encourage patient to express fears, feelings, frustrations, and discuss symptoms    - [place] patient to reality, help patient recognize reality-based thinking   - Administer medications as ordered and assess for potential side effects  - Provide the patient education related to the signs and symptoms of the illness and desired effects of prescribed medications  Outcome: Progressing  Goal: Agree to be compliant with medication regime, as prescribed and report medication side effects  Description  Interventions:  - Offer appropriate PRN medication and supervise ingestion; conduct aims, as needed   Outcome: Progressing     Problem: Ineffective Coping  Goal: Patient/Family verbalizes awareness of resources  Outcome: Progressing  Goal: Understands least restrictive measures  Description  Interventions:  - Utilize least restrictive behavior  Outcome: Progressing     Problem: Risk for Self Injury/Neglect  Goal: Treatment Goal: Remain safe during length of stay, learn and adopt new coping skills, and be free of self-injurious ideation, impulses and acts at the time of discharge  Outcome: Progressing  Goal: Verbalize thoughts and feelings  Description  Interventions:  - Assess and re-assess patient's lethality and potential for self-injury  - Engage patient in 1:1 interactions, daily, for a minimum of 15 minutes  - Encourage patient to express feelings, fears, frustrations, hopes  - Establish rapport/trust with patient   Outcome: Progressing  Goal: Refrain from harming self  Description  Interventions:  - Monitor patient closely, per order  - Develop a trusting relationship  - Supervise medication ingestion, monitor effects and side effects   Outcome: Progressing     Problem: Depression  Goal: Verbalize thoughts and feelings  Description  Interventions:  - Assess and re-assess patient's level of risk   - Engage patient in 1:1 interactions, daily, for a minimum of 15 minutes   - Encourage patient to express feelings, fears, frustrations, hopes   Outcome: Progressing     Problem: Anxiety  Goal: Anxiety is at manageable level  Description  Interventions:  - Assess and monitor patient's anxiety level  - Monitor for signs and symptoms of anxiety both physical and emotional (heart palpitations, chest pain, shortness of breath, headaches, nausea, feeling jumpy, restlessness, irritable, apprehensive)  - Collaborate with interdisciplinary team and initiate plan and interventions as ordered    - [place] patient to unit/surroundings  - Explain treatment plan  - Encourage participation in care  - Encourage verbalization of concerns/fears  - Identify coping mechanisms  - Assist in developing anxiety-reducing skills  - Administer/offer alternative therapies  - Limit or eliminate stimulants  Outcome: Progressing     Problem: Alteration in Orientation  Goal: Interact with staff daily  Description  Interventions:  - Assess and re-assess patient's level of orientation  - Engage patient in 1 on 1 interactions, daily, for a minimum of 15 minutes   - Establish rapport/trust with patient   Outcome: Progressing     Problem: PAIN - ADULT  Goal: Verbalizes/displays adequate comfort level or baseline comfort level  Description  Interventions:  - Encourage patient to monitor pain and request assistance  - Assess pain using appropriate pain scale  - Administer analgesics based on type and severity of pain and evaluate response  - Implement non-pharmacological measures as appropriate and evaluate response  - Consider cultural and social influences on pain and pain management  - Notify physician/advanced practitioner if interventions unsuccessful or patient reports new pain  Outcome: Progressing     Problem: SAFETY ADULT  Goal: Patient will remain free of falls  Description  INTERVENTIONS:  - Assess patient frequently for physical needs  -  Identify cognitive and physical deficits and behaviors that affect risk of falls  -  [place] fall precautions as indicated by assessment   - Educate patient/family on patient safety including physical limitations  - Instruct patient to call for assistance with activity based on assessment  - Modify environment to reduce risk of injury  - Consider OT/PT consult to assist with strengthening/mobility  Outcome: Progressing     Problem: Nutrition/Hydration-ADULT  Goal: Nutrient/Hydration intake appropriate for improving, restoring or maintaining nutritional needs  Description  Monitor and assess patient's nutrition/hydration status for malnutrition  Collaborate with interdisciplinary team and initiate plan and interventions as ordered  Monitor patient's weight and dietary intake as ordered or per policy  Utilize nutrition screening tool and intervene as necessary  Determine patient's food preferences and provide high-protein, high-caloric foods as appropriate       INTERVENTIONS:  - Monitor oral intake, urinary output, labs, and treatment plans  - Assess nutrition and hydration status and recommend course of action  - Evaluate amount of meals eaten  - Assist patient with eating if necessary   - Allow adequate time for meals  - Recommend/ encourage appropriate diets, oral nutritional supplements, and vitamin/mineral supplements  - Order, calculate, and assess calorie counts as needed  - Recommend, monitor, and adjust tube feedings and TPN/PPN based on assessed needs  - Assess need for intravenous fluids  - Provide specific nutrition/hydration education as appropriate  - Include patient/family/caregiver in decisions related to nutrition  Outcome: Progressing     Problem: Alteration in Thoughts and Perception  Goal: Treatment Goal: Gain control of psychotic behaviors/thinking, reduce/eliminate presenting symptoms and demonstrate improved reality functioning upon discharge  Outcome: Not Progressing  Goal: Attend and participate in unit activities, including therapeutic, recreational, and educational groups  Description  Interventions:  - Provide therapeutic and educational activities daily, encourage attendance and participation, and document same in the medical record     CERTIFIED PEER SPECIALIST INTERVENTIONS:    Complete peer assessment with patient to assess their needs and identify their goals to complete while in the recovery program as well as once discharged into the community  Patient will complete WRAP Plan, Crisis Plan and 5 Life Domains  Patient will attend 50% of groups offered on the unit  Patient will complete a goal card weekly      Outcome: Not Progressing  Goal: Recognize dysfunctional thoughts, communicate reality-based thoughts at the time of discharge  Description  Interventions:  - Provide medication and psycho-education to assist patient in compliance and developing insight into his/her illness   Outcome: Not Progressing  Goal: Complete daily ADLs, including personal hygiene independently, as able  Description  Interventions:  - Observe, teach, and assist patient with ADLS  - Monitor and promote a balance of rest/activity, with adequate nutrition and elimination   Outcome: Not Progressing     Problem: Ineffective Coping  Goal: Participates in unit activities  Description  Interventions:  - Provide therapeutic environment   - Provide required programming   - Redirect inappropriate behaviors   Outcome: Not Progressing     Problem: Risk for Self Injury/Neglect  Goal: Attend and participate in unit activities, including therapeutic, recreational, and educational groups  Description  Interventions:  - Provide therapeutic and educational activities daily, encourage attendance and participation, and document same in the medical record  - Obtain collateral information, encourage visitation and family involvement in care   Outcome: Not Progressing  Goal: Recognize maladaptive responses and adopt new coping mechanisms  Outcome: Not Progressing  Goal: Complete daily ADLs, including personal hygiene independently, as able  Description  Interventions:  - Observe, teach, and assist patient with ADLS  - Monitor and promote a balance of rest/activity, with adequate nutrition and elimination  Outcome: Not Progressing     Problem: Depression  Goal: Treatment Goal: Demonstrate behavioral control of depressive symptoms, verbalize feelings of improved mood/affect, and adopt new coping skills prior to discharge  Outcome: Not Progressing  Goal: Refrain from isolation  Description  Interventions:  - Develop a trusting relationship   - Encourage socialization   Outcome: Not Progressing  Goal: Refrain from self-neglect  Outcome: Not Progressing   Mostly isolative to her room, resting / sleeping in her bed  Out of room for meds and meals  Did not attend any of the offered groups  Did not shower or do hygiene and looks disheveled  Ate 100% of breakfast and 50% of lunch  Med compliant but still paranoid/suspicious in regards to her meds  "I still get 4 of the Zoloft right? I thought I saw 5 pills, what would happen if it took 5? Would it be serious?" Reassured the patient that she took 4 pills as ordered  No other behaviors or issues noted  Continue to monitor

## 2020-06-13 NOTE — PROGRESS NOTES
2145 Maggie did not attend PM Group  Did come out when prompted for HS snack  Took her HS medicine except the Singulair  Refused Incentive Spirometry tonight  Resolves to shower tomorrow on 7-3  Wearing now her QHS humidified nasal O2 @ 1L for bed

## 2020-06-13 NOTE — PROGRESS NOTES
Psychiatry Progress Note    Subjective: Interval History     Pt still isolative to room during the day  Reports that she is feeling tired this am; does report that she slept well last night  Did not shower last evening; stated that she will today  Continues to have poor hygiene  Po intake has been better as eating some more of meals and consuming ensure  Still somatic and lacking insight into her mental health  Denies any depressive symptoms  Anxiety, SI or HI  Medication compliant      Behavior over the last 24 hours:  unchanged  Sleep: normal  Appetite: Fair  Medication side effects: No  ROS: no complaints    Current medications:    Current Facility-Administered Medications:     acetaminophen (TYLENOL) tablet 325 mg, 325 mg, Oral, Q6H PRN, Zander Yanez MD    acetaminophen (TYLENOL) tablet 650 mg, 650 mg, Oral, Q6H PRN, Zander Yanez MD    acetaminophen (TYLENOL) tablet 650 mg, 650 mg, Oral, Q8H PRN, Zander Yanez MD    albuterol (PROVENTIL HFA,VENTOLIN HFA) inhaler 2 puff, 2 puff, Inhalation, Q4H PRN, Zander Yanez MD, 2 puff at 10/11/19 0424    aluminum-magnesium hydroxide-simethicone (MYLANTA) 200-200-20 mg/5 mL oral suspension 15 mL, 15 mL, Oral, Q4H PRN, Zander Yanez MD, 15 mL at 01/26/20 1021    ammonium lactate (LAC-HYDRIN) 12 % lotion 1 application, 1 application, Topical, BID PRN, Zander Yanez MD    benzonatate (TESSALON PERLES) capsule 100 mg, 100 mg, Oral, TID PRN, Zander Yanez MD    benztropine (COGENTIN) injection 1 mg, 1 mg, Intramuscular, Q8H PRN, Zander Yanez MD    carbamide peroxide [place] [facility]) 6 5 % otic solution 5 drop, 5 drop, Left Ear, BID PRN, Zander Yanez MD, 5 drop at 04/15/20 2128    cloZAPine (CLOZARIL) tablet 25 mg, 25 mg, Oral, BID, Zander Yanez MD, 25 mg at 06/12/20 2130    cloZAPine (CLOZARIL) tablet 50 mg, 50 mg, Oral, BID, Zander Yanez MD, 50 mg at 06/12/20 2130    docusate sodium (COLACE) capsule 100 mg, 100 mg, Oral, BID PRN, Zander Yanez MD    EPINEPHrine PF (ADRENALIN) 1 mg/mL injection 0 15 mg, 0 15 mg, Intramuscular, Once PRN, Shi Pham MD    fluticasone-vilanterol (BREO ELLIPTA) 200-25 MCG/INH inhaler 1 puff, 1 puff, Inhalation, Daily, Shi Pham MD, 1 puff at 06/13/20 0855    ketotifen (ZADITOR) 0 025 % ophthalmic solution 1 drop, 1 drop, Right Eye, BID PRN, Shi Pham MD    levothyroxine tablet 125 mcg, 125 mcg, Oral, Early Morning, Shi Pham MD, 125 mcg at 06/13/20 1260    magnesium hydroxide (MILK OF MAGNESIA) 400 mg/5 mL oral suspension 30 mL, 30 mL, Oral, Daily PRN, Shi Pham MD    montelukast (SINGULAIR) tablet 10 mg, 10 mg, Oral, HS, Shi Pham MD, 10 mg at 02/22/20 2104    OLANZapine (ZyPREXA) IM injection 5 mg, 5 mg, Intramuscular, Q8H PRN, Shi Pham MD    OLANZapine (ZyPREXA) tablet 5 mg, 5 mg, Oral, Q8H PRN, Shi Pham MD    ondansetron (ZOFRAN-ODT) dispersible tablet 4 mg, 4 mg, Oral, Q6H PRN, Shi Pham MD, 4 mg at 12/09/19 1757    pantoprazole (PROTONIX) EC tablet 40 mg, 40 mg, Oral, Early Morning, Shi Pham MD, 40 mg at 06/13/20 6435    polyethylene glycol (MIRALAX) packet 17 g, 17 g, Oral, Daily PRN, Shi Pham MD    polyvinyl alcohol (LIQUIFILM TEARS) 1 4 % ophthalmic solution 1 drop, 1 drop, Both Eyes, Q3H PRN, Shi Pham MD    sertraline (ZOLOFT) tablet 200 mg, 200 mg, Oral, Daily, Shi Pham MD, 200 mg at 06/13/20 0855    sucralfate (CARAFATE) oral suspension 1,000 mg, 1,000 mg, Oral, BID, Cat Torres MD, 1,000 mg at 06/13/20 2575    theophylline (JEF-24) 24 hr capsule 200 mg, 200 mg, Oral, Daily, Shi Pham MD, 200 mg at 06/13/20 0855    tiotropium (SPIRIVA) capsule for inhaler 18 mcg, 18 mcg, Inhalation, Daily, Shi Pham MD, 18 mcg at 06/13/20 0855    traZODone (DESYREL) tablet 25 mg, 25 mg, Oral, HS PRN, Shi Pham MD    Current Problem List:    Patient Active Problem List   Diagnosis    COPD with asthma ([facility] )    Tobacco use disorder, continuous    Compression fracture of L4 lumbar vertebra  Ventral hernia    Acute on chronic respiratory failure with hypoxia (HCC)    [facility], [facility]    Acquired hypothyroidism    Gastroesophageal reflux disease without esophagitis    Abnormal CT of the chest    Excessive cerumen in left ear canal    Lipoma of right upper extremity    Noncompliant with deep vein thrombosis (DVT) prophylaxis    At risk for aspiration    Ear ache    Tachycardia    Chest pain    Low HDL (under 40)       Problem list reviewed 06/13/20     Objective:     Vital Signs:  Vitals:    06/12/20 0700 06/12/20 0705 06/12/20 2000 06/13/20 0700   BP: 121/71 98/56 95/53 93/67   BP Location: Left arm Left arm Left arm Right arm   Pulse: 84 94 68 87   Resp: 18 18 20 18   Temp: (!) 97 3 °F (36 3 °C)  97 6 °F (36 4 °C) (!) 97 1 °F (36 2 °C)   TempSrc: Temporal  Temporal    SpO2:   91% 93%   Weight:       Height:             Appearance:  age appropriate, casually dressed and disheveled   Behavior:  normal   Speech:  normal volume   Mood:  anxious   Affect:  constricted   Thought Process:  normal   Thought Content:  delusions  somatic   Perceptual Disturbances: None   Risk Potential: none   Sensorium:  person, place and time   Cognition:  intact   Consciousness:  alert and awake    Attention: attention span and concentration were age appropriate   Intellect: average   Insight:  poor   Judgment: poor      Motor Activity: no abnormal movements       I/O Past 24 hours:  No intake/output data recorded  No intake/output data recorded  Labs:  Reviewed 06/13/20    Progress Toward Goals:  unchanged    Assessment / Plan:     Schizoaffective disorder, bipolar type ([facility] )    Recommended Treatment:      Medication changes:  1) continue current treatment plan    Non-pharmacological treatments  1) Continue with group therapy, milieu therapy and occupational therapy  Safety  1) Safety/communication plan established targeting dynamic risk factors above      2) Risks, benefits, and possible side effects of medications explained to patient and patient verbalizes understanding  Counseling / Coordination of Care    Total floor / unit time spent today 20 minutes  Greater than 50% of total time was spent with the patient and / or family counseling and / or coordination of care  A description of the counseling / coordination of care  Patient's Rights, confidentiality and exceptions to confidentiality, use of automated medical record, [facility] staff access to medical record, and consent to treatment reviewed      Gustavo Mcnulty PA-C

## 2020-06-14 NOTE — PLAN OF CARE
Problem: Alteration in Thoughts and Perception  Goal: Agree to be compliant with medication regime, as prescribed and report medication side effects  Description  Interventions:  - Offer appropriate PRN medication and supervise ingestion; conduct aims, as needed   Outcome: Progressing     Problem: Alteration in Thoughts and Perception  Goal: Attend and participate in unit activities, including therapeutic, recreational, and educational groups  Description  Interventions:  - Provide therapeutic and educational activities daily, encourage attendance and participation, and document same in the medical record     CERTIFIED PEER SPECIALIST INTERVENTIONS:    Complete peer assessment with patient to assess their needs and identify their goals to complete while in the recovery program as well as once discharged into the community  Patient will complete WRAP Plan, Crisis Plan and 5 Life Domains  Patient will attend 50% of groups offered on the unit  Patient will complete a goal card weekly      Outcome: Not Progressing  Goal: Complete daily ADLs, including personal hygiene independently, as able  Description  Interventions:  - Observe, teach, and assist patient with ADLS  - Monitor and promote a balance of rest/activity, with adequate nutrition and elimination   Outcome: Not Progressing     Ate 50% of dinner +ensure for dinner, did not attend groups, visible, social with select peers, compliant with routine medications,  will continue to monitor

## 2020-06-14 NOTE — PROGRESS NOTES
Maggie maintained on ongoing fall and SAFE precaution  Sedrick Baumgarten in bed with eyes closed, breath even and unlabored   On O2 with humidifier @1L/m via nasal cannula  Continues rounding implemented  At 0310 Maggie approach the nurse's station complain that there an odd odor coming from her oxygen tank, "it a smell I can't describe, It has a smell like a dismeissinile property"  This writer try to explain that maybe she smelling the generator but she claim that she going to  from this smell  The humidifier bottle along with the nasal canula was change  The remove set up was change on 2020    No PRN needed for sleep aid   No indication of pain or discomfort  Will continue to monitor

## 2020-06-14 NOTE — PROGRESS NOTES
Psychiatry Progress Note    Subjective: Interval History     Pt remains with poor hygiene; still continues to refuse to shower  Has continued to display paranoid delusions in regards to her medications; as well as her oxygen tank- questioning medications during each med pass and also last evening had nurse examine her oxygen tank as she felt their was an odd smell to it that was going to kill her  Continues to lack insight into her symptoms  Has been compliant with medications- po intake fair- having ensures       Behavior over the last 24 hours:  unchanged  Sleep: normal  Appetite: Fair  Medication side effects: No  ROS: no complaints    Current medications:    Current Facility-Administered Medications:     acetaminophen (TYLENOL) tablet 325 mg, 325 mg, Oral, Q6H PRN, Amina Aparicio MD    acetaminophen (TYLENOL) tablet 650 mg, 650 mg, Oral, Q6H PRN, Amina Aparicio MD    acetaminophen (TYLENOL) tablet 650 mg, 650 mg, Oral, Q8H PRN, Amina Aparicio MD    albuterol (PROVENTIL HFA,VENTOLIN HFA) inhaler 2 puff, 2 puff, Inhalation, Q4H PRN, Amina Aparicio MD, 2 puff at 10/11/19 0424    aluminum-magnesium hydroxide-simethicone (MYLANTA) 200-200-20 mg/5 mL oral suspension 15 mL, 15 mL, Oral, Q4H PRN, Amina Aparicio MD, 15 mL at 01/26/20 1021    ammonium lactate (LAC-HYDRIN) 12 % lotion 1 application, 1 application, Topical, BID PRN, Amina Aparicio MD    benzonatate (TESSALON PERLES) capsule 100 mg, 100 mg, Oral, TID PRN, Amina Aparicio MD    benztropine (COGENTIN) injection 1 mg, 1 mg, Intramuscular, Q8H PRN, Amina Aparicio MD    carbamide peroxide [place] [facility]) 6 5 % otic solution 5 drop, 5 drop, Left Ear, BID PRN, Amina Aparicio MD, 5 drop at 04/15/20 2128    cloZAPine (CLOZARIL) tablet 25 mg, 25 mg, Oral, BID, Amina Aparicio MD, 25 mg at 06/13/20 2046    cloZAPine (CLOZARIL) tablet 50 mg, 50 mg, Oral, BID, Amina Aparicio MD, 50 mg at 06/13/20 2046    docusate sodium (COLACE) capsule 100 mg, 100 mg, Oral, BID PRN, MD Dorian Shah EPINEPHrine PF (ADRENALIN) 1 mg/mL injection 0 15 mg, 0 15 mg, Intramuscular, Once PRN, Vincent Olivares MD    fluticasone-vilanterol (BREO ELLIPTA) 200-25 MCG/INH inhaler 1 puff, 1 puff, Inhalation, Daily, Vincent Olivares MD, 1 puff at 06/14/20 0920    ketotifen (ZADITOR) 0 025 % ophthalmic solution 1 drop, 1 drop, Right Eye, BID PRN, Vincent Olivares MD    levothyroxine tablet 125 mcg, 125 mcg, Oral, Early Morning, Vincent Olivares MD, 125 mcg at 06/14/20 0606    magnesium hydroxide (MILK OF MAGNESIA) 400 mg/5 mL oral suspension 30 mL, 30 mL, Oral, Daily PRN, Vincent Olivares MD    montelukast (SINGULAIR) tablet 10 mg, 10 mg, Oral, HS, Vincent Olivares MD, 10 mg at 02/22/20 2104    OLANZapine (ZyPREXA) IM injection 5 mg, 5 mg, Intramuscular, Q8H PRN, Vincent Olivares MD    OLANZapine (ZyPREXA) tablet 5 mg, 5 mg, Oral, Q8H PRN, Vincent Olivares MD    ondansetron (ZOFRAN-ODT) dispersible tablet 4 mg, 4 mg, Oral, Q6H PRN, Vincent Olivares MD, 4 mg at 12/09/19 1757    pantoprazole (PROTONIX) EC tablet 40 mg, 40 mg, Oral, Early Morning, Vincent Olivares MD, 40 mg at 06/14/20 0606    polyethylene glycol (MIRALAX) packet 17 g, 17 g, Oral, Daily PRN, Vincent Olivares MD    polyvinyl alcohol (LIQUIFILM TEARS) 1 4 % ophthalmic solution 1 drop, 1 drop, Both Eyes, Q3H PRN, Vincent Olivares MD    sertraline (ZOLOFT) tablet 200 mg, 200 mg, Oral, Daily, Vincent Olivares MD, 200 mg at 06/14/20 0920    sucralfate (CARAFATE) oral suspension 1,000 mg, 1,000 mg, Oral, BID, Cat Torres MD, 1,000 mg at 06/14/20 0606    theophylline (JEF-24) 24 hr capsule 200 mg, 200 mg, Oral, Daily, Vincent Olivares MD, 200 mg at 06/14/20 0920    tiotropium (SPIRIVA) capsule for inhaler 18 mcg, 18 mcg, Inhalation, Daily, Vincent Olivares MD, 18 mcg at 06/14/20 0920    traZODone (DESYREL) tablet 25 mg, 25 mg, Oral, HS PRN, Vincent Olivares MD    Current Problem List:    Patient Active Problem List   Diagnosis    COPD with asthma ([facility] )    Tobacco use disorder, continuous    Compression fracture of L4 lumbar vertebra    Ventral hernia    Acute on chronic respiratory failure with hypoxia (HCC)    [facility], [facility]    Acquired hypothyroidism    Gastroesophageal reflux disease without esophagitis    Abnormal CT of the chest    Excessive cerumen in left ear canal    Lipoma of right upper extremity    Noncompliant with deep vein thrombosis (DVT) prophylaxis    At risk for aspiration    Ear ache    Tachycardia    Chest pain    Low HDL (under 40)       Problem list reviewed 06/14/20     Objective:     Vital Signs:  Vitals:    06/13/20 0700 06/13/20 1900 06/13/20 2141 06/14/20 0817   BP: 93/67 98/58  125/68   BP Location: Right arm Left arm  Left arm   Pulse: 87 91  88   Resp: 18 14  18   Temp: (!) 97 1 °F (36 2 °C) (!) 97 °F (36 1 °C)  97 6 °F (36 4 °C)   TempSrc:  Temporal  Temporal   SpO2: 93% 93% 94% 93%   Weight:    64 5 kg (142 lb 2 oz)   Height:             Appearance:  age appropriate, casually dressed and disheveled   Behavior:  normal   Speech:  normal volume   Mood:  anxious   Affect:  constricted   Thought Process:  normal   Thought Content:  delusions  somatic   Perceptual Disturbances: None   Risk Potential: none   Sensorium:  person, place and time   Cognition:  intact   Consciousness:  alert and awake    Attention: attention span and concentration were age appropriate   Intellect: average   Insight:  poor   Judgment: poor      Motor Activity: no abnormal movements       I/O Past 24 hours:  No intake/output data recorded  No intake/output data recorded  Labs:  Reviewed 06/14/20    Progress Toward Goals:  unchanged    Assessment / Plan:     Schizoaffective disorder, bipolar type ([facility] )    Recommended Treatment:      Medication changes:  1) continue current treatment plan    Non-pharmacological treatments  1) Continue with group therapy, milieu therapy and occupational therapy      Safety  1) Safety/communication plan established targeting dynamic risk factors above  2) Risks, benefits, and possible side effects of medications explained to patient and patient verbalizes understanding  Counseling / Coordination of Care    Total floor / unit time spent today 20 minutes  Greater than 50% of total time was spent with the patient and / or family counseling and / or coordination of care  A description of the counseling / coordination of care  Patient's Rights, confidentiality and exceptions to confidentiality, use of automated medical record, [facility] staff access to medical record, and consent to treatment reviewed      Jose Luis Timmons PA-C

## 2020-06-14 NOTE — PLAN OF CARE
Problem: Alteration in Thoughts and Perception  Goal: Verbalize thoughts and feelings  Description  Interventions:  - Promote a nonjudgmental and trusting relationship with the patient through active listening and therapeutic communication  - Assess patient's level of functioning, behavior and potential for risk  - Engage patient in 1 on 1 interactions for a minimum of 15 minutes each session  - Encourage patient to express fears, feelings, frustrations, and discuss symptoms    - [place] patient to reality, help patient recognize reality-based thinking   - Administer medications as ordered and assess for potential side effects  - Provide the patient education related to the signs and symptoms of the illness and desired effects of prescribed medications  Outcome: Progressing  Goal: Agree to be compliant with medication regime, as prescribed and report medication side effects  Description  Interventions:  - Offer appropriate PRN medication and supervise ingestion; conduct aims, as needed   Outcome: Progressing     Problem: Ineffective Coping  Goal: Patient/Family verbalizes awareness of resources  Outcome: Progressing  Goal: Understands least restrictive measures  Description  Interventions:  - Utilize least restrictive behavior  Outcome: Progressing     Problem: Risk for Self Injury/Neglect  Goal: Treatment Goal: Remain safe during length of stay, learn and adopt new coping skills, and be free of self-injurious ideation, impulses and acts at the time of discharge  Outcome: Progressing  Goal: Verbalize thoughts and feelings  Description  Interventions:  - Assess and re-assess patient's lethality and potential for self-injury  - Engage patient in 1:1 interactions, daily, for a minimum of 15 minutes  - Encourage patient to express feelings, fears, frustrations, hopes  - Establish rapport/trust with patient   Outcome: Progressing  Goal: Refrain from harming self  Description  Interventions:  - Monitor patient closely, per order  - Develop a trusting relationship  - Supervise medication ingestion, monitor effects and side effects   Outcome: Progressing     Problem: Depression  Goal: Verbalize thoughts and feelings  Description  Interventions:  - Assess and re-assess patient's level of risk   - Engage patient in 1:1 interactions, daily, for a minimum of 15 minutes   - Encourage patient to express feelings, fears, frustrations, hopes   Outcome: Progressing     Problem: Anxiety  Goal: Anxiety is at manageable level  Description  Interventions:  - Assess and monitor patient's anxiety level  - Monitor for signs and symptoms of anxiety both physical and emotional (heart palpitations, chest pain, shortness of breath, headaches, nausea, feeling jumpy, restlessness, irritable, apprehensive)  - Collaborate with interdisciplinary team and initiate plan and interventions as ordered    - [place] patient to unit/surroundings  - Explain treatment plan  - Encourage participation in care  - Encourage verbalization of concerns/fears  - Identify coping mechanisms  - Assist in developing anxiety-reducing skills  - Administer/offer alternative therapies  - Limit or eliminate stimulants  Outcome: Progressing     Problem: Alteration in Orientation  Goal: Interact with staff daily  Description  Interventions:  - Assess and re-assess patient's level of orientation  - Engage patient in 1 on 1 interactions, daily, for a minimum of 15 minutes   - Establish rapport/trust with patient   Outcome: Progressing     Problem: PAIN - ADULT  Goal: Verbalizes/displays adequate comfort level or baseline comfort level  Description  Interventions:  - Encourage patient to monitor pain and request assistance  - Assess pain using appropriate pain scale  - Administer analgesics based on type and severity of pain and evaluate response  - Implement non-pharmacological measures as appropriate and evaluate response  - Consider cultural and social influences on pain and pain management  - Notify physician/advanced practitioner if interventions unsuccessful or patient reports new pain  Outcome: Progressing     Problem: SAFETY ADULT  Goal: Patient will remain free of falls  Description  INTERVENTIONS:  - Assess patient frequently for physical needs  -  Identify cognitive and physical deficits and behaviors that affect risk of falls  -  [place] fall precautions as indicated by assessment   - Educate patient/family on patient safety including physical limitations  - Instruct patient to call for assistance with activity based on assessment  - Modify environment to reduce risk of injury  - Consider OT/PT consult to assist with strengthening/mobility  Outcome: Progressing     Problem: Nutrition/Hydration-ADULT  Goal: Nutrient/Hydration intake appropriate for improving, restoring or maintaining nutritional needs  Description  Monitor and assess patient's nutrition/hydration status for malnutrition  Collaborate with interdisciplinary team and initiate plan and interventions as ordered  Monitor patient's weight and dietary intake as ordered or per policy  Utilize nutrition screening tool and intervene as necessary  Determine patient's food preferences and provide high-protein, high-caloric foods as appropriate       INTERVENTIONS:  - Monitor oral intake, urinary output, labs, and treatment plans  - Assess nutrition and hydration status and recommend course of action  - Evaluate amount of meals eaten  - Assist patient with eating if necessary   - Allow adequate time for meals  - Recommend/ encourage appropriate diets, oral nutritional supplements, and vitamin/mineral supplements  - Order, calculate, and assess calorie counts as needed  - Recommend, monitor, and adjust tube feedings and TPN/PPN based on assessed needs  - Assess need for intravenous fluids  - Provide specific nutrition/hydration education as appropriate  - Include patient/family/caregiver in decisions related to nutrition  Outcome: Progressing     Problem: Alteration in Thoughts and Perception  Goal: Treatment Goal: Gain control of psychotic behaviors/thinking, reduce/eliminate presenting symptoms and demonstrate improved reality functioning upon discharge  Outcome: Not Progressing  Goal: Attend and participate in unit activities, including therapeutic, recreational, and educational groups  Description  Interventions:  - Provide therapeutic and educational activities daily, encourage attendance and participation, and document same in the medical record     CERTIFIED PEER SPECIALIST INTERVENTIONS:    Complete peer assessment with patient to assess their needs and identify their goals to complete while in the recovery program as well as once discharged into the community  Patient will complete WRAP Plan, Crisis Plan and [employer]  Patient will attend 50% of groups offered on the unit  Patient will complete a goal card weekly      Outcome: Not Progressing  Goal: Recognize dysfunctional thoughts, communicate reality-based thoughts at the time of discharge  Description  Interventions:  - Provide medication and psycho-education to assist patient in compliance and developing insight into his/her illness   Outcome: Not Progressing  Goal: Complete daily ADLs, including personal hygiene independently, as able  Description  Interventions:  - Observe, teach, and assist patient with ADLS  - Monitor and promote a balance of rest/activity, with adequate nutrition and elimination   Outcome: Not Progressing     Problem: Ineffective Coping  Goal: Participates in unit activities  Description  Interventions:  - Provide therapeutic environment   - Provide required programming   - Redirect inappropriate behaviors   Outcome: Not Progressing     Problem: Risk for Self Injury/Neglect  Goal: Attend and participate in unit activities, including therapeutic, recreational, and educational groups  Description  Interventions:  - Provide therapeutic and educational activities daily, encourage attendance and participation, and document same in the medical record  - Obtain collateral information, encourage visitation and family involvement in care   Outcome: Not Progressing  Goal: Recognize maladaptive responses and adopt new coping mechanisms  Outcome: Not Progressing  Goal: Complete daily ADLs, including personal hygiene independently, as able  Description  Interventions:  - Observe, teach, and assist patient with ADLS  - Monitor and promote a balance of rest/activity, with adequate nutrition and elimination  Outcome: Not Progressing     Problem: Depression  Goal: Treatment Goal: Demonstrate behavioral control of depressive symptoms, verbalize feelings of improved mood/affect, and adopt new coping skills prior to discharge  Outcome: Not Progressing  Goal: Refrain from isolation  Description  Interventions:  - Develop a trusting relationship   - Encourage socialization   Outcome: Not Progressing  Goal: Refrain from self-neglect  Outcome: Not Progressing   Mostly isolative to her room, napping in her bed  Out for meds and meals  Did not attend any of the offered groups  Did not shower or do hygiene and looks disheveled  Ate 100% of breakfast and 75% of lunch  Med compliant without issue  No behaviors or issues noted  Continue to monitor

## 2020-06-15 NOTE — PROGRESS NOTES
Patient declined      06/15/20 1100   Activity/Group Checklist   Group   (IMR/Styles of Communication )   Attendance Did not attend   Attendance Duration (min) 46-60   Affect/Mood IRMA

## 2020-06-15 NOTE — PLAN OF CARE

## 2020-06-15 NOTE — PROGRESS NOTES
06/15/20 0906   Team Meeting   Meeting Type Daily Rounds   Initial Conference Date 06/15/20   Patient/Family Present   Patient Present No   Patient's Family Present No     Daily Rounds Documentation    Team Members Present:   DIANA Medina [place]  Ying [place], SHANIKA Rhodes, RN    Last showered on 6/7/2020  Paranoid about 02 humidifier and medications

## 2020-06-15 NOTE — PROGRESS NOTES
Psychiatry Progress Note [address] 60 y o  female MRN: [id number]  Unit/Bed#: [facility] 111-01 Encounter: [id number]  Code Status: Level 1 - Full Code    PCP: Bethanie Zaragoza PA-C    Date of Admission:  7/23/2019 5714   Date of Service:  06/15/20  Patient Active Problem List   Diagnosis    COPD with asthma ([facility] )    Tobacco use disorder, continuous    Compression fracture of L4 lumbar vertebra    Ventral hernia    Acute on chronic respiratory failure with hypoxia ([facility] )    [facility], [facility] type [facility]    Acquired hypothyroidism    Gastroesophageal reflux disease without esophagitis    Abnormal CT of the chest    Excessive cerumen in left ear canal    Lipoma of right upper extremity    Noncompliant with deep vein thrombosis (DVT) prophylaxis    At risk for aspiration    Ear ache    Tachycardia    Chest pain    Low HDL (under 40)     Diagnosis schizoaffective bipolar  Assessment    Overall Status: No showers for over 7 days again despite assurances that she will almost daily and then declines when the time comes for showers, coming up with multiple excuses    Certification Statement to demonstrate a period of stability by attending to ADLs and becoming less psychosomatic in attending more groups Acceptance by patient:  Accepting  Cj Blanco in recovery:  Living at another personal care home   Understanding of medications:  Patient is aware about risks side effects benefits and precautions of medications   Involved in reintegration process: On hold due to [address] in relationship with psychiatrist:  Trusting sometimes    Medication changes   None today  Non-pharmacological treatments   Continue with individual, group, milieu and occupational therapy using recovery principles and psycho-education about accepting illness and the need for treatment     Encourage to take showers twice a week and cooperate with treatment and adl skills as per her behav  Plan   Therapeutic passes on hold due to covid 19 lock down   Prepare for the [facility] and encouraged to accept  rather than refuse it   Reminded to attend at least 25% of groups on a daily basis including [facility]      Safety   Safety and communication plan established to target dynamic risk factors discussed above  Discharge Plan   · back to a [facility] [place]    Interval Progress   No showers in last 7 days, preoccupied, suspiciuos, with multiple excuses for not taking showers,anxious and fearful of dying or passing out if she lifts her hands up when showering, scared of  dying from choking or from heart attack or from shortness of breath, still accuses staff of tampering with her meds and inhalant and oxygen concentrator  Continues to need lot of reassurance, still appearing poorly groomed and disheveled  Happy about talking with staff from [facility] who agreed to work with her by accommodating her with a staff to assist with showering, etc   She claims the voices are almost gone but she still questions why people are wearing ear pieces! Change in last 24 hours:  None significant  Sleep:   Fair  Appetite:   Fair but she picks and chooses her food  Compliance with medications:  Good  Side effects:   none but claims to feel tired sometimes  Review of systems:  Continues to have psychosomatic symptoms as usual  Group attendance:  Poor but improving  Significant changes:  None    Mental Status Exam  Appearance:    Patient found in her room on her bed and did get up when approached  She appears, better groomed Looks older than her stated age,and not very well kept, with uncombed hairt and appears disheveled   Anxious preoccupied  Has good eye contact   Suspicious in her interaction    Today she tells me she is going to take a shower tomorrow  Behavior:    Superficially friendly pleasant but anxious suspicious and preoccupied  Speech:    tangential at times with tendency to become stilted   Mood:     anxious sad and irritated times,    Affect:    increased in intensity range mood congruent redirectable and her affect was brighter towards the end of the interview  Thought Process:   Circumstantial with tendency to have stilted speech   Thought Content:   Still paranoid about motives of staff switching her medications or tampering with her inhalant or her oxygen concentrator and giving her chloroform to kill her on the mask that was given to her off and on  No preoccupation with violence or suicide  No current suicidal homicidal thoughts intent or plans reported  No phobias but has obsessions and compulsions about examining her pills before she takes them and even questions if they are giving him too much medicine  Psychosomatic about dying by choking from food and from heart attack or from shortness of breath and now from catching Covid-19 which are all ongoing beliefs  She believe she may turn blue and die if she lifts her hands up to apply shampoo on her head during showers     Continued to express the believe her roommate is wearing an ear piece for some reason to control her thoughts  Perceptual Disturbances:  Claims to hear voices but cannot decipher   Risk Potential:   Ability to care for herself and refusal to take showers  Sensorium:   fully oriented to place, person, time, date, day, month, year and to situation  Cognition:    Grossly intact, aware of current events like the corona virus situation, recent and remote memory intact     No language deficit  Consciousness:   Alert and awake  Attention and concentration:  fair  Intellect:    average  Insight:    limited  Judgment:    fair  Motor Activity:  No abnormal involuntary movement noted today    Vitals  Temp:  [97 1 °F (36 2 °C)-97 6 °F (36 4 °C)] 97 1 °F (36 2 °C)  HR:  [80-92] 89  Resp:  [16-18] 18  BP: (100-120)/(64) 120/64  SpO2:  [93 %-96 %] 96 %  No intake or output data in the 24 hours ending 06/15/20 0913    Lab Results: No New Labs Available For Today          Current Facility-Administered Medications:  acetaminophen 325 mg Oral Q6H PRN Sarah Hanna MD   acetaminophen 650 mg Oral Q6H PRN Sarah Hanna MD   acetaminophen 650 mg Oral Q8H PRN Sarah Hanna MD   albuterol 2 puff Inhalation Q4H PRN Sarah Hanna MD   aluminum-magnesium hydroxide-simethicone 15 mL Oral Q4H PRN Sarah Hanna MD   ammonium lactate 1 application Topical BID PRN Sarah Hanna MD   benzonatate 100 mg Oral TID PRN Sarah Hanna MD   benztropine 1 mg Intramuscular Q8H PRN Sarah Hanna MD   carbamide peroxide 5 drop Left Ear BID PRN Sarah Hanna MD   cloZAPine 25 mg Oral BID Sarah Hanna MD   cloZAPine 50 mg Oral BID Sarah Hanna MD   docusate sodium 100 mg Oral BID PRN Sarah Hanna MD   EPINEPHrine PF 0 15 mg Intramuscular Once PRN Sarah Hanna MD   fluticasone-vilanterol 1 puff Inhalation Daily Sarah Hanna MD   ketotifen 1 drop Right Eye BID PRN Sarah Hanna MD   levothyroxine 125 mcg Oral Early Morning Sarah Hanna MD   magnesium hydroxide 30 mL Oral Daily PRN Sarah Hanna MD   montelukast 10 mg Oral HS Sarah Hanna MD   OLANZapine 5 mg Intramuscular Q8H PRN Sarah Hanna MD   OLANZapine 5 mg Oral Q8H PRN Sarah Hanna MD   ondansetron 4 mg Oral Q6H PRN Sarah Hanna MD   pantoprazole 40 mg Oral Early Morning Sarah Hanna MD   polyethylene glycol 17 g Oral Daily PRN Sarah Hanna MD   polyvinyl alcohol 1 drop Both Eyes Q3H PRN Sarah Hanna MD   sertraline 200 mg Oral Daily Sarah Hanna MD   sucralfate 1,000 mg Oral BID Kassy Soni MD   theophylline 200 mg Oral Daily Sarah Hanna MD   tiotropium 18 mcg Inhalation Daily Sarah Hanna MD   traZODone 25 mg Oral HS PRN Sarah Hanna MD       Counseling / Coordination of Care: Total floor / unit time spent today 15 minutes   Greater than 50% of total time was spent with the patient and / or family counseling and / or somewhat receptive to supportive listening and teaching positive coping skills to deal with symptom tarun  Patient's Rights, confidentiality and exceptions to confidentiality, use of automated medical record, [address] staff access to medical record, and consent to treatment reviewed

## 2020-06-15 NOTE — PLAN OF CARE
Problem: Alteration in Thoughts and Perception  Goal: Verbalize thoughts and feelings  Description  Interventions:  - Promote a nonjudgmental and trusting relationship with the patient through active listening and therapeutic communication  - Assess patient's level of functioning, behavior and potential for risk  - Engage patient in 1 on 1 interactions for a minimum of 15 minutes each session  - Encourage patient to express fears, feelings, frustrations, and discuss symptoms    - [place] patient to reality, help patient recognize reality-based thinking   - Administer medications as ordered and assess for potential side effects  - Provide the patient education related to the signs and symptoms of the illness and desired effects of prescribed medications  Outcome: Progressing  Goal: Agree to be compliant with medication regime, as prescribed and report medication side effects  Description  Interventions:  - Offer appropriate PRN medication and supervise ingestion; conduct aims, as needed   Outcome: Progressing  Goal: Complete daily ADLs, including personal hygiene independently, as able  Description  Interventions:  - Observe, teach, and assist patient with ADLS  - Monitor and promote a balance of rest/activity, with adequate nutrition and elimination   Outcome: Progressing     Problem: Alteration in Thoughts and Perception  Goal: Attend and participate in unit activities, including therapeutic, recreational, and educational groups  Description  Interventions:  - Provide therapeutic and educational activities daily, encourage attendance and participation, and document same in the medical record     CERTIFIED PEER SPECIALIST INTERVENTIONS:    Complete peer assessment with patient to assess their needs and identify their goals to complete while in the recovery program as well as once discharged into the community  Patient will complete WRAP Plan, Crisis Plan and 5 Life Domains      Patient will attend 50% of groups offered on the unit  Patient will complete a goal card weekly  Outcome: Not Progressing  Goal: Recognize dysfunctional thoughts, communicate reality-based thoughts at the time of discharge  Description  Interventions:  - Provide medication and psycho-education to assist patient in compliance and developing insight into his/her illness   Outcome: Not Progressing     Problem: Depression  Goal: Refrain from isolation  Description  Interventions:  - Develop a trusting relationship   - Encourage socialization   Outcome: Not Progressing     [address] was motivated to shower & groom (except wash her hair) w/setup/supervision by female MHT in handicap bathroom  This nurse assisted her to comb out, re-braid hair  She came out then for meal, ate 50% plus an Ensure, took her supper medicines  She remains paranoid, suspicious, insisting her O2 humidification bottle is tainted in some way, says smelling weird smells in room @ times on 11-7  Suspects 11-7 staff as being behind these events  Intending to use her O2 tonight without the humidification  She is isolative to her room & bed in free time  Did not respond to invitation to optional [place] [employer] group

## 2020-06-15 NOTE — PROGRESS NOTES
06/15/20 0900   Activity/Group Checklist   Group Community meeting   Attendance Did not attend   Attendance Duration (min) 31-45   Affect/Mood IRMA

## 2020-06-15 NOTE — PLAN OF CARE
Problem: Alteration in Thoughts and Perception  Goal: Verbalize thoughts and feelings  Description  Interventions:  - Promote a nonjudgmental and trusting relationship with the patient through active listening and therapeutic communication  - Assess patient's level of functioning, behavior and potential for risk  - Engage patient in 1 on 1 interactions for a minimum of 15 minutes each session  - Encourage patient to express fears, feelings, frustrations, and discuss symptoms    - [place] patient to reality, help patient recognize reality-based thinking   - Administer medications as ordered and assess for potential side effects  - Provide the patient education related to the signs and symptoms of the illness and desired effects of prescribed medications  Outcome: Progressing  Goal: Agree to be compliant with medication regime, as prescribed and report medication side effects  Description  Interventions:  - Offer appropriate PRN medication and supervise ingestion; conduct aims, as needed   Outcome: Progressing     Problem: Alteration in Thoughts and Perception  Goal: Attend and participate in unit activities, including therapeutic, recreational, and educational groups  Description  Interventions:  - Provide therapeutic and educational activities daily, encourage attendance and participation, and document same in the medical record     CERTIFIED PEER SPECIALIST INTERVENTIONS:    Complete peer assessment with patient to assess their needs and identify their goals to complete while in the recovery program as well as once discharged into the community  Patient will complete WRAP Plan, Crisis Plan and 5 Life Domains  Patient will attend 50% of groups offered on the unit  Patient will complete a goal card weekly      Outcome: Not Progressing  Goal: Complete daily ADLs, including personal hygiene independently, as able  Description  Interventions:  - Observe, teach, and assist patient with ADLS  - Monitor and promote a balance of rest/activity, with adequate nutrition and elimination   Outcome: Not Progressing    Ate 50% of dinner +ensure for dinner, did not attend groups, visible, social with select peers, compliant with routine medications,  will continue to monitor

## 2020-06-15 NOTE — PLAN OF CARE
Problem: Alteration in Thoughts and Perception  Goal: Verbalize thoughts and feelings  Description  Interventions:  - Promote a nonjudgmental and trusting relationship with the patient through active listening and therapeutic communication  - Assess patient's level of functioning, behavior and potential for risk  - Engage patient in 1 on 1 interactions for a minimum of 15 minutes each session  - Encourage patient to express fears, feelings, frustrations, and discuss symptoms    - [place] patient to reality, help patient recognize reality-based thinking   - Administer medications as ordered and assess for potential side effects  - Provide the patient education related to the signs and symptoms of the illness and desired effects of prescribed medications  Outcome: Progressing  Goal: Agree to be compliant with medication regime, as prescribed and report medication side effects  Description  Interventions:  - Offer appropriate PRN medication and supervise ingestion; conduct aims, as needed   Outcome: Progressing     Problem: Ineffective Coping  Goal: Patient/Family verbalizes awareness of resources  Outcome: Progressing  Goal: Understands least restrictive measures  Description  Interventions:  - Utilize least restrictive behavior  Outcome: Progressing     Problem: Risk for Self Injury/Neglect  Goal: Treatment Goal: Remain safe during length of stay, learn and adopt new coping skills, and be free of self-injurious ideation, impulses and acts at the time of discharge  Outcome: Progressing  Goal: Verbalize thoughts and feelings  Description  Interventions:  - Assess and re-assess patient's lethality and potential for self-injury  - Engage patient in 1:1 interactions, daily, for a minimum of 15 minutes  - Encourage patient to express feelings, fears, frustrations, hopes  - Establish rapport/trust with patient   Outcome: Progressing  Goal: Refrain from harming self  Description  Interventions:  - Monitor patient closely, per order  - Develop a trusting relationship  - Supervise medication ingestion, monitor effects and side effects   Outcome: Progressing     Problem: Depression  Goal: Verbalize thoughts and feelings  Description  Interventions:  - Assess and re-assess patient's level of risk   - Engage patient in 1:1 interactions, daily, for a minimum of 15 minutes   - Encourage patient to express feelings, fears, frustrations, hopes   Outcome: Progressing     Problem: Alteration in Orientation  Goal: Interact with staff daily  Description  Interventions:  - Assess and re-assess patient's level of orientation  - Engage patient in 1 on 1 interactions, daily, for a minimum of 15 minutes   - Establish rapport/trust with patient   Outcome: Progressing     Problem: PAIN - ADULT  Goal: Verbalizes/displays adequate comfort level or baseline comfort level  Description  Interventions:  - Encourage patient to monitor pain and request assistance  - Assess pain using appropriate pain scale  - Administer analgesics based on type and severity of pain and evaluate response  - Implement non-pharmacological measures as appropriate and evaluate response  - Consider cultural and social influences on pain and pain management  - Notify physician/advanced practitioner if interventions unsuccessful or patient reports new pain  Outcome: Progressing     Problem: SAFETY ADULT  Goal: Patient will remain free of falls  Description  INTERVENTIONS:  - Assess patient frequently for physical needs  -  Identify cognitive and physical deficits and behaviors that affect risk of falls    -  [place] fall precautions as indicated by assessment   - Educate patient/family on patient safety including physical limitations  - Instruct patient to call for assistance with activity based on assessment  - Modify environment to reduce risk of injury  - Consider OT/PT consult to assist with strengthening/mobility  Outcome: Progressing     Problem: Nutrition/Hydration-ADULT  Goal: Nutrient/Hydration intake appropriate for improving, restoring or maintaining nutritional needs  Description  Monitor and assess patient's nutrition/hydration status for malnutrition  Collaborate with interdisciplinary team and initiate plan and interventions as ordered  Monitor patient's weight and dietary intake as ordered or per policy  Utilize nutrition screening tool and intervene as necessary  Determine patient's food preferences and provide high-protein, high-caloric foods as appropriate       INTERVENTIONS:  - Monitor oral intake, urinary output, labs, and treatment plans  - Assess nutrition and hydration status and recommend course of action  - Evaluate amount of meals eaten  - Assist patient with eating if necessary   - Allow adequate time for meals  - Recommend/ encourage appropriate diets, oral nutritional supplements, and vitamin/mineral supplements  - Order, calculate, and assess calorie counts as needed  - Recommend, monitor, and adjust tube feedings and TPN/PPN based on assessed needs  - Assess need for intravenous fluids  - Provide specific nutrition/hydration education as appropriate  - Include patient/family/caregiver in decisions related to nutrition  Outcome: Progressing     Problem: Alteration in Thoughts and Perception  Goal: Treatment Goal: Gain control of psychotic behaviors/thinking, reduce/eliminate presenting symptoms and demonstrate improved reality functioning upon discharge  Outcome: Not Progressing  Goal: Attend and participate in unit activities, including therapeutic, recreational, and educational groups  Description  Interventions:  - Provide therapeutic and educational activities daily, encourage attendance and participation, and document same in the medical record     CERTIFIED PEER SPECIALIST INTERVENTIONS:    Complete peer assessment with patient to assess their needs and identify their goals to complete while in the recovery program as well as once discharged into the community  Patient will complete WRAP Plan, Crisis Plan and 5 Life Domains  Patient will attend 50% of groups offered on the unit  Patient will complete a goal card weekly      Outcome: Not Progressing  Goal: Recognize dysfunctional thoughts, communicate reality-based thoughts at the time of discharge  Description  Interventions:  - Provide medication and psycho-education to assist patient in compliance and developing insight into his/her illness   Outcome: Not Progressing  Goal: Complete daily ADLs, including personal hygiene independently, as able  Description  Interventions:  - Observe, teach, and assist patient with ADLS  - Monitor and promote a balance of rest/activity, with adequate nutrition and elimination   Outcome: Not Progressing     Problem: Ineffective Coping  Goal: Participates in unit activities  Description  Interventions:  - Provide therapeutic environment   - Provide required programming   - Redirect inappropriate behaviors   Outcome: Not Progressing     Problem: Risk for Self Injury/Neglect  Goal: Attend and participate in unit activities, including therapeutic, recreational, and educational groups  Description  Interventions:  - Provide therapeutic and educational activities daily, encourage attendance and participation, and document same in the medical record  - Obtain collateral information, encourage visitation and family involvement in care   Outcome: Not Progressing  Goal: Complete daily ADLs, including personal hygiene independently, as able  Description  Interventions:  - Observe, teach, and assist patient with ADLS  - Monitor and promote a balance of rest/activity, with adequate nutrition and elimination  Outcome: Not Progressing     Problem: Depression  Goal: Treatment Goal: Demonstrate behavioral control of depressive symptoms, verbalize feelings of improved mood/affect, and adopt new coping skills prior to discharge  Outcome: Not Progressing  Goal: Refrain from isolation  Description  Interventions:  - Develop a trusting relationship   - Encourage socialization   Outcome: Not Progressing  Goal: Refrain from self-neglect  Outcome: Not Progressing     Problem: Anxiety  Goal: Anxiety is at manageable level  Description  Interventions:  - Assess and monitor patient's anxiety level  - Monitor for signs and symptoms of anxiety both physical and emotional (heart palpitations, chest pain, shortness of breath, headaches, nausea, feeling jumpy, restlessness, irritable, apprehensive)  - Collaborate with interdisciplinary team and initiate plan and interventions as ordered  - [place] patient to unit/surroundings  - Explain treatment plan  - Encourage participation in care  - Encourage verbalization of concerns/fears  - Identify coping mechanisms  - Assist in developing anxiety-reducing skills  - Administer/offer alternative therapies  - Limit or eliminate stimulants  Outcome: Not Progressing   Mostly isolative to her room, resting / sleeping in her bed  Out of room for meds and meals  Did not attend any of the offered groups  Did not shower or do hygiene and looks disheveled  Ate 75% of breakfast, but did not come out for lunch despite multiple prompts from staff  Later on, came out of her room asking if lunch had come yet  When staff told her that they had prompted her to come ofr lunch, she said that she didn't hear staff  Different foods were offered and the patient ate a bowl of cereal and a cup of yogurt  Med compliant  Paranoid/suspicious with staff, saying that she thinks night shift staff are tampering with her oxygen  Reassurance provided, but patient still maintains that staff are out to get her / sabotage her  No other behaviors or issues noted  Continue to monitor

## 2020-06-16 NOTE — PROGRESS NOTES
06/16/20 1400   Activity/Group Checklist   Group   (Recovery Workshop )   Attendance Attended   Attendance Duration (min) 46-60   Interactions Interacted appropriately   Affect/Mood Appropriate;Normal range   Goals Achieved Discussed coping strategies; Able to self-disclose; Able to engage in interactions; Able to listen to others

## 2020-06-16 NOTE — PROGRESS NOTES
Patient declined      06/16/20 1100   Activity/Group Checklist   Group   (IMR/What is an ACT Team? )   Attendance Did not attend   Attendance Duration (min) 46-60   Affect/Mood IRMA

## 2020-06-16 NOTE — CASE MANAGEMENT
PC to [facility] to clarify when they will be able to  patient for services as we anticipate DC to [place] soon  Spoke with Trupti Morris who stated patient needs to have phone interview, which is scheduled for this Friday at 11am  Also in process of coordinating CSP

## 2020-06-16 NOTE — PLAN OF CARE
Problem: Alteration in Thoughts and Perception  Goal: Verbalize thoughts and feelings  Description  Interventions:  - Promote a nonjudgmental and trusting relationship with the patient through active listening and therapeutic communication  - Assess patient's level of functioning, behavior and potential for risk  - Engage patient in 1 on 1 interactions for a minimum of 15 minutes each session  - Encourage patient to express fears, feelings, frustrations, and discuss symptoms    - [place] patient to reality, help patient recognize reality-based thinking   - Administer medications as ordered and assess for potential side effects  - Provide the patient education related to the signs and symptoms of the illness and desired effects of prescribed medications  Outcome: Not Progressing     Problem: Ineffective Coping  Goal: Identifies healthy coping skills  Outcome: Not Progressing  Individual has been spending most of shift in bed  She did not attend groups, but did participate in treatment team meeting  During the meeting she will only talk about what she sees as her agenda, and does not focus on recovery  She continues to report thinking that someone is doing something to her  Appetite good  Able to express needs  Will continue to monitor

## 2020-06-16 NOTE — PROGRESS NOTES
Sleeping at this time, O2 on, no distress noted   Safe and fall precautions in place and maintained, monitoring continues

## 2020-06-16 NOTE — PROGRESS NOTES
Psychiatry Progress Note [address] 60 y o  female MRN: [id number]  Unit/Bed#: [facility] 111-01 Encounter: [id number]  Code Status: Level 1 - Full Code    PCP: Anne Stern PA-C    Date of Admission:  7/23/2019 9060   Date of Service:  06/16/20  Patient Active Problem List   Diagnosis    COPD with asthma [facility]    Tobacco use disorder, continuous    Compression fracture of L4 lumbar vertebra    Ventral hernia    Acute on chronic respiratory failure with hypoxia ([facility]    [facility], [facility]    Acquired hypothyroidism    Gastroesophageal reflux disease without esophagitis    Abnormal CT of the chest    Excessive cerumen in left ear canal    Lipoma of right upper extremity    Noncompliant with deep vein thrombosis (DVT) prophylaxis    At risk for aspiration    Ear ache    Tachycardia    Chest pain    Low HDL (under 40)     Diagnosis schizoaffective bipolar  Assessment    Overall Status:  Did take a shower but still paranoid suspicious about staff tampering with her oxygen and medications and claims to smell wierd things in her room    Certification Statement to demonstrate a period of stability by attending to ADLs and becoming less psychosomatic in attending more groups Acceptance by patient:  Accepting  Brigido Persons in recovery:  Living at another personal care home   Understanding of medications:  Patient is aware about risks side effects benefits and precautions of medications   Involved in reintegration process: On hold due to [address] in relationship with psychiatrist:  Trusting sometimes    Medication changes   None today  Non-pharmacological treatments   Continue with individual, group, milieu and occupational therapy using recovery principles and psycho-education about accepting illness and the need for treatment     Encourage to take showers twice a week and cooperate with treatment and adl skills as per her behav  Plan   Therapeutic passes on hold due to covid 19 lock down   Prepare for the [facility] and encouraged to accept  rather than refuse it   Reminded to attend at least 25% of groups on a daily basis including [facility]      Safety   Safety and communication plan established to target dynamic risk factors discussed above  Discharge Plan   · back to a [facility] at [address]   Patient finally took a shower yesterday with staff assistance but did not reportedly wet her hair and staff assisted in grooming  She is still accusing staff of tampering with her oxygen at night and also questions her medications and claims to smell wierd things in her room  She continues to need lot of reassurance still appearing poorly groomed disheveled  She states she still wants to go back [facility] and we are trying to contact them to see when they can take her  She reports no voices lately but still questions why some people are wearing ear pieces pointing towards some underlying paranoia  She still talks with stilted speech and is usually seen laying on bed but does get up when approached and does attend some of the groups  She still believes some staff is trying to harm her here and refusing to use her oxygen concentrator claiming that she smelled some thing in it  Change in last 24 hours:  None significant  Sleep:   Fair  Appetite:   Fair but she picks and chooses her food  Compliance with medications:  Good  Side effects:   none but claims to feel tired sometimes  Review of systems:  Continues to have psychosomatic symptoms as usual  Group attendance:  Poor but improving  Significant changes:  None    Mental Status Exam  Appearance:    Patient did attend team today better groomed and kept looks older than her stated age,and not very well kept, with uncombed hairt and appears disheveled   Anxious preoccupied  Has good eye contact   Suspicious in her interaction    Today she tells me she is going to take a shower tomorrow  Behavior:    Superficially friendly pleasant but anxious suspicious and preoccupied  Speech:    tangential at times with tendency to become stilted   Mood:     anxious sad and irritated times,    Affect:    increased in intensity range mood congruent redirectable and her affect was brighter towards the end of the interview  Thought Process:   Circumstantial with tendency to have stilted speech   Thought Content:   Still paranoid about motives of staff switching her medications or tampering with her inhalant or her oxygen concentrator and giving her chloroform to kill her on the mask that was given to her off and on  No preoccupation with violence or suicide  No current suicidal homicidal thoughts intent or plans reported  No phobias but has obsessions and compulsions about examining her pills before she takes them and even questions if they are giving him too much medicine  Psychosomatic about dying by choking from food and from heart attack or from shortness of breath and now from catching Covid-19 which are all ongoing beliefs  She believe she may turn blue and die if she lifts her hands up to apply shampoo on her head during showers     Continued to express the belief her roommate is wearing an ear piece and questions if it is to control her thoughts  Perceptual Disturbances:  Claims to hear voices but cannot decipher   Risk Potential:   Ability to care for herself and refusal to take showers  Sensorium:   fully oriented to place, person, time, date, day, month, year and to situation  Cognition:    Grossly intact, aware of current events like the corona virus situation, recent and remote memory intact     No language deficit  Consciousness:   Alert and awake  Attention and concentration:  fair  Intellect:    average  Insight:    limited  Judgment:    fair  Motor Activity:  No abnormal involuntary movement noted today    Vitals  Temp:  [97 °F (36 1 °C)] 97 °F (36 1 °C)  HR:  [66] 66  Resp:  [14] 14  BP: (92)/(58) 92/58  SpO2:  [92 %] 92 %  No intake or output data in the 24 hours ending 06/16/20 0725    Lab Results: No New Labs Available For Today          Current Facility-Administered Medications:  acetaminophen 325 mg Oral Q6H PRN Shaggy Rangel MD   acetaminophen 650 mg Oral Q6H PRN Shaggy Rangel MD   acetaminophen 650 mg Oral Q8H PRN Shaggy Rangel MD   albuterol 2 puff Inhalation Q4H PRN Shaggy Rangel MD   aluminum-magnesium hydroxide-simethicone 15 mL Oral Q4H PRN Shaggy Rangel MD   ammonium lactate 1 application Topical BID PRN Shaggy Rangel MD   benzonatate 100 mg Oral TID PRN Shaggy Rangel MD   benztropine 1 mg Intramuscular Q8H PRN Shaggy Rangel MD   carbamide peroxide 5 drop Left Ear BID PRN Shaggy Rangel MD   cloZAPine 25 mg Oral BID Shaggy Rangel MD   cloZAPine 50 mg Oral BID Shaggy Rangel MD   docusate sodium 100 mg Oral BID PRN Shaggy Rangel MD   EPINEPHrine PF 0 15 mg Intramuscular Once PRN Shaggy Rangel MD   fluticasone-vilanterol 1 puff Inhalation Daily Shaggy Rangel MD   ketotifen 1 drop Right Eye BID PRN Shaggy Rangel MD   levothyroxine 125 mcg Oral Early Morning Shaggy Rangel MD   magnesium hydroxide 30 mL Oral Daily PRN Shaggy Rangel MD   montelukast 10 mg Oral HS Shaggy Rangel MD   OLANZapine 5 mg Intramuscular Q8H PRN Shaggy Rangel MD   OLANZapine 5 mg Oral Q8H PRN Shaggy Rangel MD   ondansetron 4 mg Oral Q6H PRN Shaggy Rangel MD   pantoprazole 40 mg Oral Early Morning Shaggy Rangel MD   polyethylene glycol 17 g Oral Daily PRN Shaggy Rangel MD   polyvinyl alcohol 1 drop Both Eyes Q3H PRN Shaggy Rangel MD   sertraline 200 mg Oral Daily Shaggy Rangel MD   sucralfate 1,000 mg Oral BID Denver Cap, MD   theophylline 200 mg Oral Daily Shaggy Rangel MD   tiotropium 18 mcg Inhalation Daily Shaggy Rangel MD   traZODone 25 mg Oral HS PRN Shaggy Rangel MD       Counseling / Coordination of Care: Total floor / unit time spent today 15 minutes   Greater than 50% of total time was spent with the patient and / or family counseling and / or somewhat receptive to supportive listening and teaching positive coping skills to deal with symptom mangement  Patient's Rights, confidentiality and exceptions to confidentiality, use of automated medical record, Jeb Patrick staff access to medical record, and consent to treatment reviewed

## 2020-06-16 NOTE — PROGRESS NOTES
2[address] Maggie did not attend PM Group  Did not do Incentive Spirometry tonight as too fatigued from shower earlier  Did have HS snack & took HS medicine except the Singulair  Insisted the humidification bottle be removed  Is wearing now her QHS nasal O2 @ 1L for bed

## 2020-06-16 NOTE — PLAN OF CARE
Problem: Alteration in Thoughts and Perception  Goal: Agree to be compliant with medication regime, as prescribed and report medication side effects  Description  Interventions:  - Offer appropriate PRN medication and supervise ingestion; conduct aims, as needed   Outcome: Progressing     Problem: Alteration in Thoughts and Perception  Goal: Attend and participate in unit activities, including therapeutic, recreational, and educational groups  Description  Interventions:  - Provide therapeutic and educational activities daily, encourage attendance and participation, and document same in the medical record     CERTIFIED PEER SPECIALIST INTERVENTIONS:    Complete peer assessment with patient to assess their needs and identify their goals to complete while in the recovery program as well as once discharged into the community  Patient will complete WRAP Plan, Crisis Plan and 5 Life Domains  Patient will attend 50% of groups offered on the unit  Patient will complete a goal card weekly  Outcome: Not Progressing     Problem: Depression  Goal: Refrain from isolation  Description  Interventions:  - Develop a trusting relationship   - Encourage socialization   Outcome: Not Progressing     1943 Claud[address] Finch mostly isolates in room sitting crossed legged on bed or sleeping  She did come out for supper medicine & to have meal; ate 25% (baked potato) plus an Ensure  She also spent some time coloring & writing in dining room after meal  She is pleasant w/staff & peers, but, a bit suspicious  Has not responded to invitations to any of tonight's offered programming

## 2020-06-16 NOTE — PROGRESS NOTES
Patient declined      06/16/20 0900   Activity/Group Checklist   Group Community meeting   Attendance Did not attend   Attendance Duration (min) 31-45   Affect/Mood IRMA

## 2020-06-16 NOTE — PROGRESS NOTES
06/16/20 1100   Team Meeting   Meeting Type Tx Team Meeting   Initial Conference Date 06/16/20   Next Conference Date 06/23/20   Team Members Present   Team Members Present Physician;Nurse;;; Other (Discipline and Name)   Physician Team Member Dr Mindy Messina, RN   Care Management Team Member Erika Velásquez 55 Work Team Member Ammon Feldman [place]   Other (Discipline and Name) Yann Akins [address]   Patient/Family Present   Patient Present Yes   Patient's Family Present No     Patient presented for her treatment team this morning, did not complete a self assessment but was able to answer questions appropriately  Patient took a shower and still is paranoid that certain staff may be trying to harm her  Patient counseled appropriately  CM is awaiting response from [place] as to bed status and discharge planning

## 2020-06-16 NOTE — PROGRESS NOTES
06/16/20 0915   Team Meeting   Meeting Type Daily Rounds   Initial Conference Date 06/16/20   Patient/Family Present   Patient Present No   Patient's Family Present No     Daily Rounds Documentation     Team Members Present:   DIANA Joe [place]  Loan Camilo, SHANIKA Soria, RN    Still paranoid about her humidifier  Showered

## 2020-06-17 NOTE — PROGRESS NOTES
Psychiatry Progress Note [address] 60 y o  female MRN: [id number]  Unit/Bed#: [facility] 111-01 Encounter: [id number]  Code Status: Level 1 - Full Code    PCP: Trish Alfaro PA-C    Date of Admission:  7/23/2019 1738   Date of Service:  06/17/20  Patient Active Problem List   Diagnosis    [facility] ([facility]    Tobacco use disorder, continuous    Compression fracture of L4 lumbar vertebra    Ventral hernia    [facility]    Schizoaffective disorder, bipolar type ([facility] )    Acquired hypothyroidism    Gastroesophageal reflux disease without esophagitis    Abnormal CT of the chest    Excessive cerumen in left ear canal    Lipoma of right upper extremity    Noncompliant with deep vein thrombosis (DVT) prophylaxis    At risk for aspiration    Ear ache    Tachycardia    Chest pain    Low HDL (under 40)     Diagnosis schizoaffective bipolar  Assessment    Overall Status:  Happy that she will have another intake interview by the [place] personal care [place] and is now agreeing to go the rather than the resistive    Certification Statement to demonstrate a period of stability by attending to ADLs and becoming less psychosomatic in attending more groups Acceptance by patient:  Accepting  Otyeseniala Sell in recovery:  Living at another personal care home   Understanding of medications:  Patient is aware about risks side effects benefits and precautions of medications   Involved in reintegration process: On hold due to [address] in relationship with psychiatrist:  Trusting sometimes    Medication changes   None today  Non-pharmacological treatments   Continue with individual, group, milieu and occupational therapy using recovery principles and psycho-education about accepting illness and the need for treatment   Encourage to take showers twice a week and cooperate with treatment and adl skills as per her behav  Plan   Therapeutic passes on hold due to covid 19 lock down   Prepare for the [facility] and encouraged to accept  rather than refuse it   Reminded to attend at least 25% of groups on a daily basis including [facility]      Safety   Safety and communication plan established to target dynamic risk factors discussed above  Discharge Plan   · back to a [facility] at [address]  Patient is now happy that she is going to have a phone interview with the [place] personal [facility] [place] this Friday following which the [facility] needs to be scheduled for her eventual discharge as they already have a bed waiting for her  She is still somewhat suspicious refusing to use the humidifier and only the oxygen at night  Still accuses staff of tampering with her oxygen at night and refuses some of the medications off and on  Continues to express some paranoia about medications and staff  No voices reported lately but still questions the motives of staff  Continues to have stilted speech and found in the dining arrieta and did approached me with a smile  Still with some psychosomatic symptoms but grooming has improved  Change in last 24 hours:  None significant  Sleep:   Fair  Appetite:   Fair but she picks and chooses her food  Compliance with medications:  Good  Side effects:   none but claims to feel tired sometimes  Review of systems:  Continues to have psychosomatic symptoms as usual  Group attendance:  Poor but improving  Significant changes:  None    Mental Status Exam  Appearance:    Patient seen today in the dining room  today better groomed and kept looks older than her stated age,and not very well kept, with uncombed hairt and appears disheveled   Anxious preoccupied  Has good eye contact   Suspicious in her interaction    Today she tells me she is going to take a shower tomorrow  Behavior:    Superficially friendly pleasant but anxious suspicious and preoccupied  Speech:    tangential at times with tendency to become stilted   Mood:     anxious sad and irritated times,    Affect:    increased in intensity range mood congruent redirectable and her affect was brighter towards the end of the interview  Thought Process:   Circumstantial with tendency to have stilted speech   Thought Content:   Still paranoid about motives of staff switching her medications or tampering with her inhalant or her oxygen concentrator and giving her chloroform to kill her on the mask that was given to her off and on  No preoccupation with violence or suicide  No current suicidal homicidal thoughts intent or plans reported  No phobias but has obsessions and compulsions about examining her pills before she takes them and even questions if they are giving him too much medicine  Psychosomatic about dying by choking from food and from heart attack or from shortness of breath and now from catching Covid-19 which are all ongoing beliefs  She believe she may turn blue and die if she lifts her hands up to apply shampoo on her head during showers     Continued to express the belief her roommate is wearing an ear piece and questions if it is to control her thoughts  Perceptual Disturbances:  Claims to hear voices but cannot decipher   Risk Potential:   Ability to care for herself and refusal to take showers  Sensorium:   fully oriented to place, person, time, date, day, month, year and to situation  Cognition:    Grossly intact, aware of current events like the corona virus situation, recent and remote memory intact     No language deficit  Consciousness:   Alert and awake  Attention and concentration:  fair  Intellect:    average  Insight:    limited  Judgment:    fair  Motor Activity:  No abnormal involuntary movement noted today    Vitals  Temp:  [97 °F (36 1 °C)-97 1 °F (36 2 °C)] 97 1 °F (36 2 °C)  HR:  [78-80] 78  Resp:  [18] 18  BP: (100-110)/(59-71) 110/59  SpO2:  [92 %-95 %] 95 %  No intake or output data in the 24 hours ending 06/17/20 0923    Lab Results: No New Labs Available For Today          Current Facility-Administered Medications:  acetaminophen 325 mg Oral Q6H PRN Sandhya Bolaños, MD   acetaminophen 650 mg Oral Q6H PRN Sandhya Bolaños, MD   acetaminophen 650 mg Oral Q8H PRN Sandhya Bolaños, MD   albuterol 2 puff Inhalation Q4H PRN Sandhya Bolaños, MD   aluminum-magnesium hydroxide-simethicone 15 mL Oral Q4H PRN Sandhya Bolaños, MD   ammonium lactate 1 application Topical BID PRN Sandhya Bolaños, MD   benzonatate 100 mg Oral TID PRN Sandhya Stain, MD   benztropine 1 mg Intramuscular Q8H PRN Sandhya Mami, MD   carbamide peroxide 5 drop Left Ear BID PRN Sandhya Stain, MD   cloZAPine 25 mg Oral BID Sandhya Stain, MD   cloZAPine 50 mg Oral BID Sandhya Stain, MD   docusate sodium 100 mg Oral BID PRN Sandhya Stain, MD   EPINEPHrine PF 0 15 mg Intramuscular Once PRN Sandhya Bolaños, MD   fluticasone-vilanterol 1 puff Inhalation Daily Sandhya Bolaños MD   ketotifen 1 drop Right Eye BID PRN Sandhay Bolaños, MD   levothyroxine 125 mcg Oral Early Morning Sandhya Bolaños, MD   magnesium hydroxide 30 mL Oral Daily PRN Sandhya Bolaños, MD   montelukast 10 mg Oral HS Sandhya Bolaños, MD   OLANZapine 5 mg Intramuscular Q8H PRN Sandhya Bolaños MD   OLANZapine 5 mg Oral Q8H PRN Sandhya Bolaños MD   ondansetron 4 mg Oral Q6H PRN Sandhya Bolaños, MD   pantoprazole 40 mg Oral Early Morning Sandhya Bolaños MD   polyethylene glycol 17 g Oral Daily PRN Sandhya Bolaños MD   polyvinyl alcohol 1 drop Both Eyes Q3H PRN Sandhya Bolaños, MD   sertraline 200 mg Oral Daily Sandhya Bolaños MD   sucralfate 1,000 mg Oral BID Verlan Right, MD   theophylline 200 mg Oral Daily Sandhya Bolaños MD   tiotropium 18 mcg Inhalation Daily Sandhya Bolaños MD   traZODone 25 mg Oral HS PRN Sandhya Bolaños MD       Counseling / Coordination of Care: Total floor / unit time spent today 15 minutes   Greater than 50% of total time was spent with the patient and / or family counseling and / or somewhat receptive to supportive listening and teaching positive coping skills to deal with symptom mangement  Patient's Rights, confidentiality and exceptions to confidentiality, use of automated medical record, Jeb Patrick staff access to medical record, and consent to treatment reviewed

## 2020-06-17 NOTE — PLAN OF CARE
Problem: Ineffective Coping  Goal: Identifies healthy coping skills  Outcome: Not Progressing  Goal: Demonstrates healthy coping skills  Outcome: Not Progressing  Goal: Participates in unit activities  Description  Interventions:  - Provide therapeutic environment   - Provide required programming   - Redirect inappropriate behaviors   Outcome: Not Progressing  Individual has been keeping to self, in bed majority of the shift  She was visible only at meal times  She did not participate in programming, no groups were attended  She was somatic early in the shift, c/o urinary frequency  She had no other symptoms  She also reported drinking a large cup of juice before retiring to bed last night  Compliant with meds  Able to express needs  Availability of staff made known  Will continue to monitor

## 2020-06-17 NOTE — PLAN OF CARE
Problem: Ineffective Coping  Goal: Participates in unit activities  Description  Interventions:  - Provide therapeutic environment   - Provide required programming   - Redirect inappropriate behaviors   Outcome: Not Progressing   Writer attempted to meet face to face with Patient to complete Life Domains  Writer explained to Patient this [address] was in the process of getting her D/C folder together with her [facility] Relapse Prevention plan and WRAP Plan  Writer also explained it could be beneficial to her to complete the Life Domains so ACT and her Housing unit are aware of her community goals  Patient did not attempt to get up however, Patient asked if she can complete them herself  Albertina Elliott will attempt to collect the completed document for her D/C folder on 6/18/2020

## 2020-06-17 NOTE — PROGRESS NOTES
06/17/20 1100   Activity/Group Checklist   Group   (IMR/What is a Group Home? )   Attendance Did not attend   Attendance Duration (min) Greater than 60   Affect/Mood IRMA

## 2020-06-17 NOTE — PROGRESS NOTES
06/17/20 0900   Activity/Group Checklist   Group Community meeting   Attendance Did not attend   Attendance Duration (min) 31-45   Affect/Mood IRMA

## 2020-06-17 NOTE — PLAN OF CARE
Problem: Alteration in Thoughts and Perception  Goal: Agree to be compliant with medication regime, as prescribed and report medication side effects  Description  Interventions:  - Offer appropriate PRN medication and supervise ingestion; conduct aims, as needed   Outcome: Progressing     Problem: Alteration in Thoughts and Perception  Goal: Attend and participate in unit activities, including therapeutic, recreational, and educational groups  Description  Interventions:  - Provide therapeutic and educational activities daily, encourage attendance and participation, and document same in the medical record     CERTIFIED PEER SPECIALIST INTERVENTIONS:    Complete peer assessment with patient to assess their needs and identify their goals to complete while in the recovery program as well as once discharged into the community  Patient will complete WRAP Plan, Crisis Plan and 5 Life Domains  Patient will attend 50% of groups offered on the unit  Patient will complete a goal card weekly  Outcome: Not Progressing  Goal: Complete daily ADLs, including personal hygiene independently, as able  Description  Interventions:  - Observe, teach, and assist patient with ADLS  - Monitor and promote a balance of rest/activity, with adequate nutrition and elimination   Outcome: Not Progressing     Problem: Depression  Goal: Refrain from isolation  Description  Interventions:  - Develop a trusting relationship   - Encourage socialization   Outcome: Not Progressing     Problem: Nutrition/Hydration-ADULT  Goal: Nutrient/Hydration intake appropriate for improving, restoring or maintaining nutritional needs  Description  Monitor and assess patient's nutrition/hydration status for malnutrition  Collaborate with interdisciplinary team and initiate plan and interventions as ordered  Monitor patient's weight and dietary intake as ordered or per policy  Utilize nutrition screening tool and intervene as necessary   Determine patient's food preferences and provide high-protein, high-caloric foods as appropriate  INTERVENTIONS:  - Monitor oral intake, urinary output, labs, and treatment plans  - Assess nutrition and hydration status and recommend course of action  - Evaluate amount of meals eaten  - Assist patient with eating if necessary   - Allow adequate time for meals  - Recommend/ encourage appropriate diets, oral nutritional supplements, and vitamin/mineral supplements  - Order, calculate, and assess calorie counts as needed  - Recommend, monitor, and adjust tube feedings and TPN/PPN based on assessed needs  - Assess need for intravenous fluids  - Provide specific nutrition/hydration education as appropriate  - Include patient/family/caregiver in decisions related to nutrition  Outcome: Not Progressing     [address] A[address] Jang is isolative to her room & bed sleeping  She came out for supper medicine when it was announced  Ate poorly @ meal, 10% (cassi) saying the portions were too big & she didn't like what was sent, but, did have an Ensure  Somatic preoccupation w/perception of slipping blood sugar levels; disbelieving her PO2, insisting it is lower than machine registered  Did not respond to invitation to PM Group  Disheveled; no attention paid to grooming

## 2020-06-17 NOTE — PROGRESS NOTES
06/17/20 0917   Team Meeting   Meeting Type Daily Rounds   Initial Conference Date 06/17/20   Patient/Family Present   Patient Present No   Patient's Family Present No     Daily Rounds Documentation     Team Members Present:   Lupe Sullivan, [facility]  MD Desmond Corrigan, RN    Complained of frequent urination last night; however, had a large drink prior to bed  Paranoid

## 2020-06-17 NOTE — PROGRESS NOTES
2145 Niki Henderson did an exemplary job w/the Incentive Spirometer; got consistent 1250ml volumes  She had HS snack, came for HS medicine except the Singulair  Wearing now her QHS nasal O2 @ 1L for bed  Is not using the humidification bottle due to paranoia that someone on 11-7 tampers w/it

## 2020-06-18 NOTE — NURSING NOTE
Pt is isolative to room  Anxious and somatically preoccupied  Reported "urinary frequency", encouraged to drink at specific times  Refusing to use I S  Last Shower 6/15  Appetite is poor, ensure is ordered  Support and reassurance provided, staff availability reinforced  Pt depression is unrated, mild-moderate anxiety noted, denies hallucinations/homicidal and suicidal ideations  Denies pain

## 2020-06-18 NOTE — PROGRESS NOTES
06/18/20 1100   Activity/Group Checklist   Group   (IMR/Recovery Anonymous )   Attendance Did not attend   Attendance Duration (min) 46-60   Affect/Mood IRMA

## 2020-06-18 NOTE — PROGRESS NOTES
Maggie maintained on ongoing fall and SAFE precaution  Kalia Sabot in bed with eyes closed, breath even and unlabored   On O2 with humidifier @1L/m via nasal cannula  Continues rounding implemented   No somatic complaint overnight  No PRN needed for sleep aid   No indication of pain or discomfort  Will continue to monitor

## 2020-06-18 NOTE — PLAN OF CARE
Problem: Nutrition/Hydration-ADULT  Goal: Nutrient/Hydration intake appropriate for improving, restoring or maintaining nutritional needs  Description  Monitor and assess patient's nutrition/hydration status for malnutrition  Collaborate with interdisciplinary team and initiate plan and interventions as ordered  Monitor patient's weight and dietary intake as ordered or per policy  Utilize nutrition screening tool and intervene as necessary  Determine patient's food preferences and provide high-protein, high-caloric foods as appropriate  INTERVENTIONS:  - Monitor oral intake, urinary output, labs, and treatment plans  - Assess nutrition and hydration status and recommend course of action  - Evaluate amount of meals eaten  - Assist patient with eating if necessary   - Allow adequate time for meals  - Recommend/ encourage appropriate diets, oral nutritional supplements, and vitamin/mineral supplements  - Order, calculate, and assess calorie counts as needed  - Recommend, monitor, and adjust tube feedings and TPN/PPN based on assessed needs  - Assess need for intravenous fluids  - Provide specific nutrition/hydration education as appropriate  - Include patient/family/caregiver in decisions related to nutrition  Outcome: Progressing     Problem: Anxiety  Goal: Anxiety is at manageable level  Description  Interventions:  - Assess and monitor patient's anxiety level  - Monitor for signs and symptoms of anxiety both physical and emotional (heart palpitations, chest pain, shortness of breath, headaches, nausea, feeling jumpy, restlessness, irritable, apprehensive)  - Collaborate with interdisciplinary team and initiate plan and interventions as ordered    - [place] patient to unit/surroundings  - Explain treatment plan  - Encourage participation in care  - Encourage verbalization of concerns/fears  - Identify coping mechanisms  - Assist in developing anxiety-reducing skills  - Administer/offer alternative therapies  - Limit or eliminate stimulants  Outcome: Progressing

## 2020-06-18 NOTE — PROGRESS NOTES
2145 Yoanna Higginbotham refused Incentive [employer]  She did come out for HS medicine except the Singulair, had an HS snack  Had to give her medical advise to staff dealing w/somatic dramatic peer  "She needs her blood pressure taken " Was reminded to focus only on own concerns  Wearing now her QHS nasal O2 @ 1L without the humidification bottle as distrusts 11-[employer] staff, fears they tamper w/bottle

## 2020-06-18 NOTE — PROGRESS NOTES
06/18/20 0900   Activity/Group Checklist   Group Community meeting   Attendance Did not attend   Attendance Duration (min) 31-45   Affect/Mood IRMA

## 2020-06-18 NOTE — PROGRESS NOTES
Psychiatry Progress Note [address] 60 y o  female MRN: [id number]  Unit/Bed#: [facility] 111-01 Encounter: [id number]  Code Status: Level 1 - Full Code    PCP: Karen Holm PA-C    Date of Admission:  7/23/2019 1730   Date of Service:  06/18/20  Patient Active Problem List   Diagnosis    COPD with asthma ([facility] )    Tobacco use disorder, continuous    Compression fracture of L4 lumbar vertebra    Ventral hernia    [facility] on [facility]    Schizoaffective disorder, bipolar type ([facility] )    Acquired hypothyroidism    Gastroesophageal reflux disease without esophagitis    Abnormal CT of the chest    Excessive cerumen in left ear canal    Lipoma of right upper extremity    Noncompliant with deep vein thrombosis (DVT) prophylaxis    At risk for aspiration    Ear ache    Tachycardia    Chest pain    Low HDL (under 40)     Diagnosis schizoaffective bipolar  Assessment    Overall Status: waiting for discharge back to the [address] [place] [address] to demonstrate a period of stability by attending to ADLs and becoming less psychosomatic in attending more groups Acceptance by patient:  Accepting  Puma Check in recovery:  Living at another personal care home   Understanding of medications:  Patient is aware about risks side effects benefits and precautions of medications   Involved in reintegration process: On hold due to [address] in relationship with psychiatrist:  Trusting sometimes    Medication changes   None today  Non-pharmacological treatments   Continue with individual, group, milieu and occupational therapy using recovery principles and psycho-education about accepting illness and the need for treatment   Encourage to take showers twice a week and cooperate with treatment and adl skills as per her behav   Plan   Therapeutic passes on hold due to covid 19 lock down   Prepare for the [facility] and encouraged to accept  rather than refuse it   Reminded to attend at least 25% of groups on a daily basis including [facility]      Safety   Safety and communication plan established to target dynamic risk factors discussed above  Discharge Plan   · back to a [facility] at [address]  Patient acknowledges that she will have another phone interview with the [facility] care [place] tomorrow and agrees to take a shower tonight in anticipation of that  He told the medical resident yesterday that her mother who is 80years old may be the one tampering with her oxygen concentrator but when asked about it today she dismisses it and is willing to consider that it is not possible for her to come to the unit considering her age and the fact that no one is allowed to the unit because of COVID-19 restrictions  She is still somewhat suspicious refusing to use the humidifier and only the oxygen at night and continues to accuse certain staff of tampering with her oxygen at night and refuses some of the medications off and on and would examine the pills personally before taking them  Continues to express some paranoia about medications and staff  No voices reported lately but still questions the motives of staff  Continues to have stilted speech and found on her bed and did Greet me with a smile when approached and guarded  Still with some psychosomatic symptoms but grooming has improved    She claims that she spoke with her son who is now back from the tour of duty in [place] and is back in [address] in last 24 hours:  None significant  Sleep:   Fair  Appetite:   Fair but she picks and chooses her food  Compliance with medications:  Good  Side effects:   none but claims to feel tired sometimes  Review of systems:  Continues to have psychosomatic symptoms as usual  Group attendance:  Poor but improving  Significant changes:  None    Mental Status Exam  Appearance:    Patient seen today on her bed in her room   She appears better groomed today but continues to look older than her stated age,and not very well kept, with uncombed hairt and appears disheveled   Anxious preoccupied  Has good eye contact   Suspicious in her interaction  Today she tells me she is going to take a shower tomorrow  Behavior:    Superficially friendly pleasant but anxious suspicious and preoccupied  Speech:    tangential at times with tendency to become stilted   Mood:     anxious sad and irritated times,    Affect:    increased in intensity range mood congruent redirectable and her affect was brighter towards the end of the interview  Thought Process:   Circumstantial with tendency to have stilted speech   Thought Content:   Continues to express some paranoid about motives of staff switching her medications or tampering with her inhalant or her oxygen concentrator and giving her chloroform to kill her on the mask that was given to her off and on  She now believes that her mother is 80years old mother may also be trying to poison her on the unit  No preoccupation with violence or suicide  No current suicidal homicidal thoughts intent or plans reported  No phobias but has obsessions and compulsions about examining her pills before she takes them and even questions if they are giving him too much medicine  Psychosomatic about dying by choking from food and from heart attack or from shortness of breath and now from catching Covid-19 which are all ongoing beliefs  She believe she may turn blue and die if she lifts her hands up to apply shampoo on her head during showers     Continued to express the belief her roommate is wearing an ear piece and questions if it is to control her thoughts    She claims that she will try to take a shower tonight  Perceptual Disturbances:  Claims to hear voices but cannot decipher   Risk Potential:   Ability to care for herself and refusal to take showers  Sensorium:   fully oriented to place, person, time, date, day, month, year and to situation  Cognition:    Grossly intact, aware of current events like the corona virus situation, recent and remote memory intact     No language deficit  Consciousness:   Alert and awake  Attention and concentration:  fair  Intellect:    average  Insight:    limited  Judgment:    fair  Motor Activity:  No abnormal involuntary movement noted today    Vitals  Temp:  [98 4 °F (36 9 °C)] 98 4 °F (36 9 °C)  HR:  [96] 96  Resp:  [16] 16  BP: (105)/(70) 105/70  SpO2:  [94 %] 94 %  No intake or output data in the 24 hours ending 06/18/20 0745    Lab Results: No New Labs Available For Today          Current Facility-Administered Medications:  acetaminophen 325 mg Oral Q6H PRN Christian Bennett MD   acetaminophen 650 mg Oral Q6H PRN Christian Bennett MD   acetaminophen 650 mg Oral Q8H PRN Christian Bennett MD   albuterol 2 puff Inhalation Q4H PRN Christian Bennett MD   aluminum-magnesium hydroxide-simethicone 15 mL Oral Q4H PRN Christian Bennett MD   ammonium lactate 1 application Topical BID PRN Christian Bennett MD   benzonatate 100 mg Oral TID PRN Christian Bennett MD   benztropine 1 mg Intramuscular Q8H PRN Christian Bennett MD   carbamide peroxide 5 drop Left Ear BID PRN Christian Bennett MD   cloZAPine 25 mg Oral BID Christian Bennett MD   cloZAPine 50 mg Oral BID Christian Bennett MD   docusate sodium 100 mg Oral BID PRN Christian Bennett MD   EPINEPHrine PF 0 15 mg Intramuscular Once PRN Christian Bennett MD   fluticasone-vilanterol 1 puff Inhalation Daily Christian Bennett MD   ketotifen 1 drop Right Eye BID PRN Christian Bennett MD   levothyroxine 125 mcg Oral Early Morning Christian Bennett MD   magnesium hydroxide 30 mL Oral Daily PRN Christian Bennett MD   montelukast 10 mg Oral HS Christian Bennett MD   OLANZapine 5 mg Intramuscular Q8H PRN Christian Bennett MD   OLANZapine 5 mg Oral Q8H PRN Christian Bennett MD   ondansetron 4 mg Oral Q6H PRN Christian Bennett MD   pantoprazole 40 mg Oral Early Morning Christian Bennett MD   polyethylene glycol 17 g Oral Daily PRN Christian Bennett MD polyvinyl alcohol 1 drop Both Eyes Q3H PRN Tere Madden MD   sertraline 200 mg Oral Daily Tree Madden MD   sucralfate 1,000 mg Oral BID Sachin Mahoney MD   theophylline 200 mg Oral Daily Tree Madden MD   tiotropium 18 mcg Inhalation Daily Tree Madden MD   traZODone 25 mg Oral HS PRN Tree Madden MD       Counseling / Coordination of Care: Total floor / unit time spent today 15 minutes  Greater than 50% of total time was spent with the patient and / or family counseling and / or somewhat receptive to supportive listening and teaching positive coping skills to deal with symptom mangement  Patient's Rights, confidentiality and exceptions to confidentiality, use of automated medical record, Jeb Patrick staff access to medical record, and consent to treatment reviewed

## 2020-06-18 NOTE — PROGRESS NOTES
06/18/20 1627   Team Meeting   Meeting Type Daily Rounds   Initial Conference Date 06/18/20   Team Members Present   Team Members Present Physician;Nurse;;; Other (Discipline and Name)   Physician Team Member Dr Dawna Piña RN   Care Management Team Member Beryl Hanna [place]   Social Work Team Member Vanda ORTIZ   Other (Discipline and Name) SHANIKA Sharma   Patient/Family Present   Patient Present No   Patient's Family Present No     Case reviewed  Patient did not attend groups  [facility] 6/24  Intrusive with peers medical issues

## 2020-06-19 NOTE — CASE MANAGEMENT
Patient had phone interview today with Genaro Phoenix from [facility]  Patient shared that during interview she expressed concern for having Dr Hoang Dickey again, as he does "hypnotizm" and she does not feel comfortable with him  Patient reminded there may be another provider able to see her such as the [facility]  And she then agreed to this as an option  In follow up with Genaro Phoenix regarding patient's hesitation, and clarified she is open to the other provider on the team  Also CM has been in contact with [facility] and [place] regarding planning for [facility] Wednesday

## 2020-06-19 NOTE — PROGRESS NOTES
Psychiatry Progress Note [address] 60 y o  female MRN: [id number]  Unit/Bed#: [facility]ARC[facility] ADA[facility] AUDRA [facility] 111-01 Encounter: [id number]  Code Status: Level 1 - Full Code    PCP: Praveen Barrios PA-C    Date of Admission:  7/23/2019 4946   Date of Service:  06/19/20  Patient Active Problem List   Diagnosis    COPD with asthma ([facility] )    Tobacco use disorder, continuous    Compression fracture of L4 lumbar vertebra    Ventral hernia    Acute on chronic respiratory failure with hypoxia ([facility] )    Schizoaffective disorder, bipolar type ([facility] )    Acquired hypothyroidism    Gastroesophageal reflux disease without esophagitis    Abnormal CT of the chest    Excessive cerumen in left ear canal    Lipoma of right upper extremity    Noncompliant with deep vein thrombosis (DVT) prophylaxis    At risk for aspiration    Ear ache    Tachycardia    Chest pain    Low HDL (under 40)     Diagnosis schizoaffective bipolar  Assessment    Overall Status:  Again waiting for discharge back to the [facility] care [place] and hopeful about having another interview by video today with the personal care home    Certification Statement to demonstrate a period of stability by attending to ADLs and becoming less psychosomatic in attending more groups Acceptance by patient:  Accepting  Radha Vincent in recovery:  Living at another personal care home   Understanding of medications:  Patient is aware about risks side effects benefits and precautions of medications   Involved in reintegration process: On hold due to [address] in relationship with psychiatrist:  Trusting sometimes    Medication changes   None today  Non-pharmacological treatments   Continue with individual, group, milieu and occupational therapy using recovery principles and psycho-education about accepting illness and the need for treatment     Encourage to take showers twice a week and cooperate with treatment and adl skills as per her behav  Plan   Therapeutic passes on hold due to covid 19 lock down   Prepare for the [facility] and encouraged to accept  rather than refuse it   Reminded to attend at least 25% of groups on a daily basis including [facility]      Safety   Safety and communication plan established to target dynamic risk factors discussed above  Discharge Plan   · back to a [facility] at [address]  Patient is anticipating a tele health  interview today with the [facility] personal care home  He did not take a shower even though she had told me she was going to last night claiming that she did not have enough help  She continues to express the same paranoid ideations and psychosomatic symptoms as before  She is talking about her 8 EKG at all mother coming here to tamper with her oxygen concentrator social refuses it and uses the nasal oxygen without the concentrator  He she also selectively refuses to take some of the medications and always examines her pills  She claims that she is urinating and dehydrated but when I checked her tongue it is moist and she did not appear dehydrated at all  No voices reported lately but continues to question certain staff members if they are trying to harm her or kill her which is an ongoing thing  Again found in her usual sport on her bed but did she get up and Greet me when I approached her  She remains somewhat guarded evasive preoccupied as usual and grooming is still not that great  Change in last 24 hours:  None significant  Sleep:   Fair  Appetite:   Fair but she picks and chooses her food  Compliance with medications:  Good  Side effects:   none but claims to feel tired sometimes  Review of systems:  Continues to have psychosomatic symptoms as usual  Group attendance:  Poor but improving  Significant changes:  None    Mental Status Exam  Appearance:    Found laying on bed but did get up when approached    She appears not very well groomed today but continues to look older than her stated age,and not very well kept, with uncombed hair and appears disheveled   Anxious preoccupied  Has good eye contact   Suspicious in her interaction  Today she tells me she is going to take a shower tomorrow  Behavior:    Superficially friendly pleasant but anxious suspicious and preoccupied  Speech:    tangential at times with tendency to become stilted   Mood:     anxious sad and irritated times,    Affect:    increased in intensity range mood congruent redirectable and her affect was brighter towards the end of the interview  Thought Process:   Circumstantial with tendency to have stilted speech   Thought Content:   Still paranoid about motives of staff switching her medications or tampering with her inhalant or her oxygen concentrator and giving her chloroform to kill her on the mask that was given to her off and on  She now believes that her mother is 80years old mother may also be trying to poison her on the unit but able to accept that it is not possible  No preoccupation with violence or suicide  No current suicidal homicidal thoughts intent or plans reported  No phobias but has obsessions and compulsions about examining her pills before she takes them and even questions if they are giving him too much medicine  Psychosomatic about dying by choking from food and from heart attack or from shortness of breath and now from catching Covid-19 which are all ongoing beliefs  She believe she may turn blue and die if she lifts her hands up to apply shampoo on her head during showers  Perceptual Disturbances:  Claims to hear voices but cannot decipher   Risk Potential:   Ability to care for herself and refusal to take showers  Sensorium:   fully oriented to place, person, time, date, day, month, year and to situation  Cognition:    Grossly intact, aware of current events like the corona virus situation, recent and remote memory intact     No language deficit  Consciousness:   Alert and awake  Attention and concentration:  fair  Intellect:    average  Insight:    limited  Judgment:    fair  Motor Activity:  No abnormal involuntary movement noted today    Vitals  Temp:  [97 2 °F (36 2 °C)] 97 2 °F (36 2 °C)  HR:  [101] 101  Resp:  [15] 15  BP: (96)/(60) 96/60  SpO2:  [93 %-95 %] 95 %  No intake or output data in the 24 hours ending 06/19/20 0740    Lab Results: No New Labs Available For Today  Results from last 7 days   Lab Units 06/19/20  0602   WBC Thousand/uL 8 00   RBC Million/uL 4 50   HEMOGLOBIN g/dL 14 4   HEMATOCRIT % 42 8   MCV fL 95   PLATELETS Thousands/uL 210   NEUTROS ABS Thousands/µL 3 90         Current Facility-Administered Medications:  acetaminophen 325 mg Oral Q6H PRN Felisa Florence MD   acetaminophen 650 mg Oral Q6H PRN Felisa Florence MD   acetaminophen 650 mg Oral Q8H PRN Felisa Florence MD   albuterol 2 puff Inhalation Q4H PRN Felisa Florence MD   aluminum-magnesium hydroxide-simethicone 15 mL Oral Q4H PRN Felisa Florence MD   ammonium lactate 1 application Topical BID PRN Felisa Florence MD   benzonatate 100 mg Oral TID PRN Felisa Florence MD   benztropine 1 mg Intramuscular Q8H PRN Felisa Florence MD   carbamide peroxide 5 drop Left Ear BID PRN Felisa Florence MD   cloZAPine 25 mg Oral BID Felisa Florence MD   cloZAPine 50 mg Oral BID Felisa Florence MD   docusate sodium 100 mg Oral BID PRN Felisa Florence MD   EPINEPHrine PF 0 15 mg Intramuscular Once PRN Felisa Florence MD   fluticasone-vilanterol 1 puff Inhalation Daily Felisa Florence MD   ketotifen 1 drop Right Eye BID PRBJORN Florence MD   levothyroxine 125 mcg Oral Early Morning Felisa Florence MD   magnesium hydroxide 30 mL Oral Daily PRN Felisa Florence MD   montelukast 10 mg Oral HS Felisa Florence MD   OLANZapine 5 mg Intramuscular Q8H PRN Felisa Florence MD   OLANZapine 5 mg Oral Q8H PRN Felisa Florence MD   ondansetron 4 mg Oral Q6H PRN Felisa Florence MD   pantoprazole 40 mg Oral Early Morning Felisa Florence MD   polyethylene glycol 17 g Oral Daily PRN Felisa Florence MD polyvinyl alcohol 1 drop Both Eyes Q3H PRN Jeet Levine MD   sertraline 200 mg Oral Daily Jeet Levine MD   sucralfate 1,000 mg Oral BID Carola Fournier MD   theophylline 200 mg Oral Daily Jeet Levine MD   tiotropium 18 mcg Inhalation Daily Jeet Levine MD   traZODone 25 mg Oral HS PRN Jeet Levine MD       Counseling / Coordination of Care: Total floor / unit time spent today 15 minutes  Greater than 50% of total time was spent with the patient and / or family counseling and / or somewhat receptive to supportive listening and teaching positive coping skills to deal with symptom mangement  Patient's Rights, confidentiality and exceptions to confidentiality, use of automated medical record, Jeb Patrick staff access to medical record, and consent to treatment reviewed

## 2020-06-19 NOTE — PROGRESS NOTES
06/19/20 1232   Team Meeting   Meeting Type Daily Rounds   Initial Conference Date 06/19/20   Team Members Present   Team Members Present Physician;Nurse;;; Other (Discipline and Name)   Physician Team Member Dr Celina Nageotte Team Member Monika Rod, LISA   Care Management Team Member Hilario Trent [place]   Social Work Team Member Geno ORTIZ   Other (Discipline and Name) Tamra Martines RN   Patient/Family Present   Patient Present No   Patient's Family Present No     Case reviewed  Patient had a good evening  [address], CSP on 6/24/20, will need CBC

## 2020-06-19 NOTE — PLAN OF CARE
Problem: Alteration in Thoughts and Perception  Goal: Treatment Goal: Gain control of psychotic behaviors/thinking, reduce/eliminate presenting symptoms and demonstrate improved reality functioning upon discharge  Outcome: Progressing  Goal: Verbalize thoughts and feelings  Description  Interventions:  - Promote a nonjudgmental and trusting relationship with the patient through active listening and therapeutic communication  - Assess patient's level of functioning, behavior and potential for risk  - Engage patient in 1 on 1 interactions for a minimum of 15 minutes each session  - Encourage patient to express fears, feelings, frustrations, and discuss symptoms    - Roundup patient to reality, help patient recognize reality-based thinking   - Administer medications as ordered and assess for potential side effects  - Provide the patient education related to the signs and symptoms of the illness and desired effects of prescribed medications  Outcome: Progressing  Goal: Agree to be compliant with medication regime, as prescribed and report medication side effects  Description  Interventions:  - Offer appropriate PRN medication and supervise ingestion; conduct aims, as needed   Outcome: Progressing  Goal: Attend and participate in unit activities, including therapeutic, recreational, and educational groups  Description  Interventions:  - Provide therapeutic and educational activities daily, encourage attendance and participation, and document same in the medical record     CERTIFIED PEER SPECIALIST INTERVENTIONS:    Complete peer assessment with patient to assess their needs and identify their goals to complete while in the recovery program as well as once discharged into the community  Patient will complete WRAP Plan, Crisis Plan and 5 Life Domains  Patient will attend 50% of groups offered on the unit  Patient will complete a goal card weekly      Outcome: Progressing  Goal: Recognize dysfunctional thoughts, communicate reality-based thoughts at the time of discharge  Description  Interventions:  - Provide medication and psycho-education to assist patient in compliance and developing insight into his/her illness   Outcome: Progressing     Problem: Ineffective Coping  Goal: Participates in unit activities  Description  Interventions:  - Provide therapeutic environment   - Provide required programming   - Redirect inappropriate behaviors   Outcome: Progressing  Goal: Patient/Family verbalizes awareness of resources  Outcome: Progressing  Goal: Understands least restrictive measures  Description  Interventions:  - Utilize least restrictive behavior  Outcome: Progressing     Problem: Risk for Self Injury/Neglect  Goal: Treatment Goal: Remain safe during length of stay, learn and adopt new coping skills, and be free of self-injurious ideation, impulses and acts at the time of discharge  Outcome: Progressing  Goal: Verbalize thoughts and feelings  Description  Interventions:  - Assess and re-assess patient's lethality and potential for self-injury  - Engage patient in 1:1 interactions, daily, for a minimum of 15 minutes  - Encourage patient to express feelings, fears, frustrations, hopes  - Establish rapport/trust with patient   Outcome: Progressing  Goal: Refrain from harming self  Description  Interventions:  - Monitor patient closely, per order  - Develop a trusting relationship  - Supervise medication ingestion, monitor effects and side effects   Outcome: Progressing  Goal: Attend and participate in unit activities, including therapeutic, recreational, and educational groups  Description  Interventions:  - Provide therapeutic and educational activities daily, encourage attendance and participation, and document same in the medical record  - Obtain collateral information, encourage visitation and family involvement in care   Outcome: Progressing     Problem: Depression  Goal: Treatment Goal: Demonstrate behavioral control of depressive symptoms, verbalize feelings of improved mood/affect, and adopt new coping skills prior to discharge  Outcome: Progressing  Goal: Verbalize thoughts and feelings  Description  Interventions:  - Assess and re-assess patient's level of risk   - Engage patient in 1:1 interactions, daily, for a minimum of 15 minutes   - Encourage patient to express feelings, fears, frustrations, hopes   Outcome: Progressing  Goal: Refrain from isolation  Description  Interventions:  - Develop a trusting relationship   - Encourage socialization   Outcome: Progressing     Problem: Anxiety  Goal: Anxiety is at manageable level  Description  Interventions:  - Assess and monitor patient's anxiety level  - Monitor for signs and symptoms of anxiety both physical and emotional (heart palpitations, chest pain, shortness of breath, headaches, nausea, feeling jumpy, restlessness, irritable, apprehensive)  - Collaborate with interdisciplinary team and initiate plan and interventions as ordered    - [place] patient to unit/surroundings  - Explain treatment plan  - Encourage participation in care  - Encourage verbalization of concerns/fears  - Identify coping mechanisms  - Assist in developing anxiety-reducing skills  - Administer/offer alternative therapies  - Limit or eliminate stimulants  Outcome: Progressing     Problem: Alteration in Orientation  Goal: Interact with staff daily  Description  Interventions:  - Assess and re-assess patient's level of orientation  - Engage patient in 1 on 1 interactions, daily, for a minimum of 15 minutes   - Establish rapport/trust with patient   Outcome: Progressing     Problem: PAIN - ADULT  Goal: Verbalizes/displays adequate comfort level or baseline comfort level  Description  Interventions:  - Encourage patient to monitor pain and request assistance  - Assess pain using appropriate pain scale  - Administer analgesics based on type and severity of pain and evaluate response  - Implement non-pharmacological measures as appropriate and evaluate response  - Consider cultural and social influences on pain and pain management  - Notify physician/advanced practitioner if interventions unsuccessful or patient reports new pain  Outcome: Progressing     Problem: SAFETY ADULT  Goal: Patient will remain free of falls  Description  INTERVENTIONS:  - Assess patient frequently for physical needs  -  Identify cognitive and physical deficits and behaviors that affect risk of falls  -  [place] fall precautions as indicated by assessment   - Educate patient/family on patient safety including physical limitations  - Instruct patient to call for assistance with activity based on assessment  - Modify environment to reduce risk of injury  - Consider OT/PT consult to assist with strengthening/mobility  Outcome: Progressing     Problem: Nutrition/Hydration-ADULT  Goal: Nutrient/Hydration intake appropriate for improving, restoring or maintaining nutritional needs  Description  Monitor and assess patient's nutrition/hydration status for malnutrition  Collaborate with interdisciplinary team and initiate plan and interventions as ordered  Monitor patient's weight and dietary intake as ordered or per policy  Utilize nutrition screening tool and intervene as necessary  Determine patient's food preferences and provide high-protein, high-caloric foods as appropriate       INTERVENTIONS:  - Monitor oral intake, urinary output, labs, and treatment plans  - Assess nutrition and hydration status and recommend course of action  - Evaluate amount of meals eaten  - Assist patient with eating if necessary   - Allow adequate time for meals  - Recommend/ encourage appropriate diets, oral nutritional supplements, and vitamin/mineral supplements  - Order, calculate, and assess calorie counts as needed  - Recommend, monitor, and adjust tube feedings and TPN/PPN based on assessed needs  - Assess need for intravenous fluids  - Provide specific nutrition/hydration education as appropriate  - Include patient/family/caregiver in decisions related to nutrition  Outcome: Progressing   Visible and active in the milieu, pleasant and friendly with others, attended evening group  Ate 100% of dinner plus an Ensure and had a snack  Med compliant but refused Singulair  No somatic complaints voiced  No issues noted  Continue to monitor

## 2020-06-19 NOTE — PROGRESS NOTES
~Maggie maintained on ongoing fall and SAFE precaution   Laying in bed with eyes closed, breath even and unlabored   On O2 with humidifier @1L/m via nasal cannula  Continues rounding implemented   No somatic complaint overnight  No PRN needed for sleep aid   No indication of pain or discomfort  Will continue to monitor     ~Maggie has scheduled labwork to be obtain this morning

## 2020-06-19 NOTE — PROGRESS NOTES
06/19/20 1100   Activity/Group Checklist   Group Other (Comment)  (IMR group: Meet and Greet/Self Assessment Review)   Attendance Refused     Pt encouraged to attend group but choose to stay in bed

## 2020-06-19 NOTE — PLAN OF CARE
Problem: Alteration in Thoughts and Perception  Goal: Agree to be compliant with medication regime, as prescribed and report medication side effects  Description  Interventions:  - Offer appropriate PRN medication and supervise ingestion; conduct aims, as needed   Outcome: Progressing     Problem: Ineffective Coping  Goal: Demonstrates healthy coping skills  Outcome: Progressing     Problem: Depression  Goal: Refrain from isolation  Description  Interventions:  - Develop a trusting relationship   - Encourage socialization   Outcome: Progressing     Problem: Alteration in Thoughts and Perception  Goal: Attend and participate in unit activities, including therapeutic, recreational, and educational groups  Description  Interventions:  - Provide therapeutic and educational activities daily, encourage attendance and participation, and document same in the medical record     CERTIFIED PEER SPECIALIST INTERVENTIONS:    Complete peer assessment with patient to assess their needs and identify their goals to complete while in the recovery program as well as once discharged into the community  Patient will complete WRAP Plan, Crisis Plan and 5 Life Domains  Patient will attend 50% of groups offered on the unit  Patient will complete a goal card weekly  Outcome: Not Progressing  Goal: Complete daily ADLs, including personal hygiene independently, as able  Description  Interventions:  - Observe, teach, and assist patient with ADLS  - Monitor and promote a balance of rest/activity, with adequate nutrition and elimination   Outcome: Not Gene Torrez has been more visible out on unit tonight  Sitting in phone chair in [place] early in shift when it was not in use, greeted oncoming staff warmly  Her usual inquiries & need for reassurance about today's lab work  No inclination to attend to hygiene, 4 days since last shower   Did come for supper medicine & ate 100% of her meagre supper tray (ordered mashed potato only) plus drank an Ensure  Cooperative w/having EKG done  Did not respond to invitation to optional [place] group, but, did come out of room to initiate a card game w/male peer & they are playing now in dining room   She is pleasant, more interactive w/peers than her usual

## 2020-06-20 NOTE — PLAN OF CARE
Problem: Alteration in Thoughts and Perception  Goal: Verbalize thoughts and feelings  Description  Interventions:  - Promote a nonjudgmental and trusting relationship with the patient through active listening and therapeutic communication  - Assess patient's level of functioning, behavior and potential for risk  - Engage patient in 1 on 1 interactions for a minimum of 15 minutes each session  - Encourage patient to express fears, feelings, frustrations, and discuss symptoms    - [place] patient to reality, help patient recognize reality-based thinking   - Administer medications as ordered and assess for potential side effects  - Provide the patient education related to the signs and symptoms of the illness and desired effects of prescribed medications  Outcome: Progressing     Problem: Anxiety  Goal: Anxiety is at manageable level  Description  Interventions:  - Assess and monitor patient's anxiety level  - Monitor for signs and symptoms of anxiety both physical and emotional (heart palpitations, chest pain, shortness of breath, headaches, nausea, feeling jumpy, restlessness, irritable, apprehensive)  - Collaborate with interdisciplinary team and initiate plan and interventions as ordered    - [place] patient to unit/surroundings  - Explain treatment plan  - Encourage participation in care  - Encourage verbalization of concerns/fears  - Identify coping mechanisms  - Assist in developing anxiety-reducing skills  - Administer/offer alternative therapies  - Limit or eliminate stimulants  Outcome: Progressing     Problem: SKIN/TISSUE INTEGRITY - ADULT  Goal: Skin integrity remains intact  Description  INTERVENTIONS  - Identify patients at risk for skin breakdown  - Assess and monitor skin integrity  - Assess and monitor nutrition and hydration status  - Monitor labs (i e  albumin)  - Assess for incontinence   - Turn and reposition patient  - Assist with mobility/ambulation  - Relieve pressure over bony prominences  - Avoid friction and shearing  - Provide appropriate hygiene as needed including keeping skin clean and dry  - Evaluate need for skin moisturizer/barrier cream  - Collaborate with interdisciplinary team (i e  Nutrition, Rehabilitation, etc )   - Patient/family teaching  Outcome: Progressing     Problem: Alteration in Thoughts and Perception  Goal: Attend and participate in unit activities, including therapeutic, recreational, and educational groups  Description  Interventions:  - Provide therapeutic and educational activities daily, encourage attendance and participation, and document same in the medical record     CERTIFIED PEER SPECIALIST INTERVENTIONS:    Complete peer assessment with patient to assess their needs and identify their goals to complete while in the recovery program as well as once discharged into the community  Patient will complete WRAP Plan, Crisis Plan and 5 Life Domains  Patient will attend 50% of groups offered on the unit  Patient will complete a goal card weekly  Outcome: Not Progressing  Goal: Complete daily ADLs, including personal hygiene independently, as able  Description  Interventions:  - Observe, teach, and assist patient with ADLS  - Monitor and promote a balance of rest/activity, with adequate nutrition and elimination   Outcome: Not Progressing     Problem: Depression  Goal: Refrain from isolation  Description  Interventions:  - Develop a trusting relationship   - Encourage socialization   Outcome: Not Progressing  Goal: Refrain from self-neglect  Outcome: Not Sophia De Dios has been isolative to her self and room most of the day  Napping at times  Only came out for medication and meals  No issue with swallowing pills  Used inhalers as ordered  Has not been somatic  Disheveled appearance  Ate 75% of breakfast  No shower or BM  Did not attend any groups  Appeared to have a nice phone call today  Ate 50% of lunch  Continue to monitor   Precautions maintained

## 2020-06-20 NOTE — PROGRESS NOTES
Maggie maintained on ongoing fall and SAFE precaution  Marlise Pallas in bed with eyes closed, breath even and unlabored    Continues rounding implemented   No somatic complaint overnight  No PRN needed for sleep aid   No indication of pain or discomfort  Will continue to monitor  [place] Organ

## 2020-06-20 NOTE — PROGRESS NOTES
Psychiatry Progress Note [address] 63 y o  female MRN: [id number]  Unit/Bed#: [facility] 111-01 Encounter: [id number]  Code Status: Level 1 - Full Code    PCP: Reggie Berry PA-C    Date of Admission:  7/23/2019 1730   Date of Service:  06/20/20  Patient Active Problem List   Diagnosis    COPD with asthma ([facility] )    Tobacco use disorder, continuous    Compression fracture of L4 lumbar vertebra    Ventral hernia    Acute on chronic [facility] failure with [facility]    Schizoaffective disorder, bipolar type ([facility] )    Acquired hypothyroidism    Gastroesophageal reflux disease without esophagitis    Abnormal CT of the chest    Excessive cerumen in left ear canal    Lipoma of right upper extremity    Noncompliant with deep vein thrombosis (DVT) prophylaxis    At risk for aspiration    Ear ache    Tachycardia    Chest pain    Low HDL (under 40)     Diagnosis schizoaffective bipolar  Assessment    Overall Status:  Continues to wait for discharge back to the [facility] personal [facility] [place] and the [facility] is now scheduled in the coming Wednesday and she will be accepted by the [facility] [facility] team with Dr Cathy Santo to demonstrate a period of stability by attending to ADLs and becoming less psychosomatic in attending more groups Acceptance by patient:  Accepting  Nadeem Gamino in recovery:  Living at another personal care home   Understanding of medications:  Patient is aware about risks side effects benefits and precautions of medications   Involved in reintegration process: On hold due to [address] in relationship with psychiatrist:  Trusting sometimes    Medication changes   None today  Non-pharmacological treatments   Continue with individual, group, milieu and occupational therapy using recovery principles and psycho-education about accepting illness and the need for treatment     Encourage to take showers twice a week and cooperate with treatment and adl skills as per her behav  Plan   Therapeutic passes on hold due to covid 19 lock down   Prepare for the [facility] and encouraged to accept  rather than refuse it   Reminded to attend at least 25% of groups on a daily basis including [facility]      Safety   Safety and communication plan established to target dynamic risk factors discussed above  Discharge Plan   · back to a [facility] at [address] [facility] no showers since last Sunday and appears poor groomed except for wearing clean clothes and hair is uncombed  She is still suspicious and today she is asking questions about Dr Matt Lux from [facility] who would be treating her on the outside and when I reminded her that he will be a good psychiatrist for her she finally agreed to give it a try as he has been her psychiatrist years ago as well  Still paranoid suspicious preoccupied times asking people if her oxygen concentrator it is time bed with or some of the medications at time bed with  She is making it a point to attend more groups  She is not voicing any somatic symptoms today  No voices reported at the time of the interview  She is still psychosomatic about dying from shortness of breath or chest pains or dying of coronavirus etc which are all fixated believes but she is usually redirectable  She sometimes questions her believes and asked the staff if it and it is her paranoia or not    Change in last 24 hours:  None significant  Sleep:   Fair  Appetite:   Fair but she picks and chooses her food  Compliance with medications:  Good  Side effects:   none but claims to feel tired sometimes  Review of systems:  Continues to have psychosomatic symptoms as usual  Group attendance:  Poor but improving  Significant changes:  None    Mental Status Exam  Appearance:    Found laying on bed but did get up when approached as usual   She appears not very well groomed today but continues to look older than her stated age, with uncombed hair and appears disheveled but wearing clean clothes   Anxious preoccupied  Has good eye contact   Suspicious in her interaction  Today she tells me she is going to take a shower tomorrow  Behavior:    Superficially friendly pleasant but anxious suspicious and preoccupied  Speech:    tangential at times with tendency to become stilted   Mood:     anxious sad and irritated times,    Affect:    increased in intensity range mood congruent redirectable and her affect was brighter towards the end of the interview  Thought Process:   Circumstantial with tendency to have stilted speech   Thought Content:   Continues to express some paranoid about motives of staff switching her medications or tampering with her inhalant or her oxygen concentrator and giving her chloroform to kill her on the mask that was given to her off and on  She now believes that her mother is 80years old mother may also be trying to poison her on the unit but able to accept that it is not possible but sometimes she questions if it is her paranoia or not! Maria Esther Nissen No preoccupation with violence or suicide  No current suicidal homicidal thoughts intent or plans reported  No phobias but has obsessions and compulsions about examining her pills before she takes them and even questions if they are giving him too much medicine  Psychosomatic about dying by choking from food and from heart attack or from shortness of breath and now from catching Covid-19 which are all ongoing beliefs       Perceptual Disturbances:  Claims to hear voices but cannot decipher   Risk Potential:   Ability to care for herself and refusal to take showers  Sensorium:   fully oriented to place, person, time, date, day, month, year and to situation  Cognition:    Grossly intact, aware of current events like the corona virus situation, recent and remote memory intact     No language deficit  Consciousness:   Alert and awake  Attention and concentration:  fair  Intellect: average  Insight:    limited  Judgment:    fair  Motor Activity:  No abnormal involuntary movement noted today    Vitals  Temp:  [97 °F (36 1 °C)-98 1 °F (36 7 °C)] 98 1 °F (36 7 °C)  HR:  [] 78  Resp:  [14-18] 18  BP: (92-97)/(60-71) 95/71  SpO2:  [92 %] 92 %  No intake or output data in the 24 hours ending 06/20/20 1103    Lab Results: No New Labs Available For Today  Results from last 7 days   Lab Units 06/19/20  0602   WBC Thousand/uL 8 00   RBC Million/uL 4 50   HEMOGLOBIN g/dL 14 4   HEMATOCRIT % 42 8   MCV fL 95   PLATELETS Thousands/uL 210   NEUTROS ABS Thousands/µL 3 90         Current Facility-Administered Medications:  acetaminophen 325 mg Oral Q6H PRN Shaggy Rangel MD   acetaminophen 650 mg Oral Q6H PRN Shaggy Rangel MD   acetaminophen 650 mg Oral Q8H PRN Shaggy Rangel MD   albuterol 2 puff Inhalation Q4H PRN Shaggy Rangel MD   aluminum-magnesium hydroxide-simethicone 15 mL Oral Q4H PRN Shaggy Rangel MD   ammonium lactate 1 application Topical BID PRN Shaggy Rangel MD   benzonatate 100 mg Oral TID PRN Shaggy Rangel MD   benztropine 1 mg Intramuscular Q8H PRN Shaggy Rangel MD   carbamide peroxide 5 drop Left Ear BID PRN Shaggy Rangel MD   cloZAPine 25 mg Oral BID Shaggy Rangel MD   cloZAPine 50 mg Oral BID Shaggy Rangel MD   docusate sodium 100 mg Oral BID PRN Shaggy Rangel MD   EPINEPHrine PF 0 15 mg Intramuscular Once PRN Shaggy Rangel MD   fluticasone-vilanterol 1 puff Inhalation Daily Shaggy Rangel MD   ketotifen 1 drop Right Eye BID PRN Shaggy Rangel MD   levothyroxine 125 mcg Oral Early Morning Shaggy Rangel MD   magnesium hydroxide 30 mL Oral Daily PRN Shaggy Rangel MD   montelukast 10 mg Oral HS Shaggy Rangel MD   OLANZapine 5 mg Intramuscular Q8H PRN Shaggy Rangel MD   OLANZapine 5 mg Oral Q8H PRN Shaggy Rangel MD   ondansetron 4 mg Oral Q6H PRN Shaggy Rangel MD   pantoprazole 40 mg Oral Early Morning Shaggy Rangel MD   polyethylene glycol 17 g Oral Daily PRN Shaggy aRngel MD   polyvinyl alcohol 1 drop Both Eyes Q3H PRN Erivn Little MD   sertraline 200 mg Oral Daily Ervin Little MD   sucralfate 1,000 mg Oral BID Ayah Amador MD   theophylline 200 mg Oral Daily Ervin Little MD   tiotropium 18 mcg Inhalation Daily Ervin Little MD   traZODone 25 mg Oral HS PRN Ervin Little MD       Counseling / Coordination of Care: Total floor / unit time spent today 15 minutes  Greater than 50% of total time was spent with the patient and / or family counseling and / or somewhat receptive to supportive listening and teaching positive coping skills to deal with symptom mangement  Patient's Rights, confidentiality and exceptions to confidentiality, use of automated medical record, Jeb Titus adeline staff access to medical record, and consent to treatment reviewed

## 2020-06-20 NOTE — PROGRESS NOTES
2145 Maggie did not attend PM Group  She did her Incentive Spirometry, but, performed poorly tonight, struggling to reach 1000ml of targeted 1250ml  Did have an HS snack, took her HS medicine except the Singulair  Still struggles w/suspicions; relates that when she accepted her 0600 medicine from specific night nurse, & drank the accompanying water, "I couldn't breath right for several minutes  You don't think she put something in the water, or, is it   'paranoia'?" In this regard a smidgen more insightful that experiences paranoia  Wearing now her QHS nasal O2 @ 1L for bed, again, without the humidification bottle as suspects 11-7 staff may tamper w/the bottle

## 2020-06-21 NOTE — PLAN OF CARE
Problem: Alteration in Thoughts and Perception  Goal: Agree to be compliant with medication regime, as prescribed and report medication side effects  Description  Interventions:  - Offer appropriate PRN medication and supervise ingestion; conduct aims, as needed   Outcome: Progressing     Problem: Nutrition/Hydration-ADULT  Goal: Nutrient/Hydration intake appropriate for improving, restoring or maintaining nutritional needs  Description  Monitor and assess patient's nutrition/hydration status for malnutrition  Collaborate with interdisciplinary team and initiate plan and interventions as ordered  Monitor patient's weight and dietary intake as ordered or per policy  Utilize nutrition screening tool and intervene as necessary  Determine patient's food preferences and provide high-protein, high-caloric foods as appropriate       INTERVENTIONS:  - Monitor oral intake, urinary output, labs, and treatment plans  - Assess nutrition and hydration status and recommend course of action  - Evaluate amount of meals eaten  - Assist patient with eating if necessary   - Allow adequate time for meals  - Recommend/ encourage appropriate diets, oral nutritional supplements, and vitamin/mineral supplements  - Order, calculate, and assess calorie counts as needed  - Recommend, monitor, and adjust tube feedings and TPN/PPN based on assessed needs  - Assess need for intravenous fluids  - Provide specific nutrition/hydration education as appropriate  - Include patient/family/caregiver in decisions related to nutrition  Outcome: Progressing     Problem: Alteration in Thoughts and Perception  Goal: Attend and participate in unit activities, including therapeutic, recreational, and educational groups  Description  Interventions:  - Provide therapeutic and educational activities daily, encourage attendance and participation, and document same in the medical record     [address] :    Complete peer assessment with patient to assess their needs and identify their goals to complete while in the recovery program as well as once discharged into the community  Patient will complete WRAP Plan, Crisis Plan and 5 Life Domains  Patient will attend 50% of groups offered on the unit  Patient will complete a goal card weekly  Outcome: Not Progressing  Goal: Complete daily ADLs, including personal hygiene independently, as able  Description  Interventions:  - Observe, teach, and assist patient with ADLS  - Monitor and promote a balance of rest/activity, with adequate nutrition and elimination   Outcome: Not Progressing     Problem: Depression  Goal: Refrain from isolation  Description  Interventions:  - Develop a trusting relationship   - Encourage socialization   Outcome: Not Progressing     [address] Zendejas opted out of showering or grooming (6 days without) even though approached 3 times about getting underway early in shift  Reminded her that goal of regular bathing is one of her doctor's expectations for her in treatment  Acknowledges, but, makes no move  She isolates in room sleeping, though, came out for supper medicine when called & to eat 100% of meal (baked potato, carrots) plus an Ensure  Did not respond to invitation to optional music tutorial group held outside on [facility]

## 2020-06-21 NOTE — PROGRESS NOTES
2145 Rito Baez did her Incentive Spirometry, but, gave lack lustre performance, achieving 1000-1100ml of her goal volume of 1250ml  Did have HS snack, took HS medicine except the Singulair  Intends tomorrow 3-11 to shower, but, believes the female MHT doesn't want to help her  Presented to her that staff is trying to encourage independent function  "I have enough wits about me to know when I need help  If I don't get help here, I won't get help once I'm discharged " Was encouraged to be considerate of staff's time; start the grooming process early by [phone] when staff more available to her & not already engaged in other responsibilities  Is agreeable  She says she has developed a list of questions, concerns for the [place] when she has her visual chat w/them anticipated this coming week  Wearing now her QHS nasal O2 @ 1L for bed  Continues to refuse the humidification bottle for fear 11-7 will tamper w/it

## 2020-06-21 NOTE — PROGRESS NOTES
Psychiatry Progress Note [address] 63 y o  female MRN: [id number]  Unit/Bed#: MARCIO ADA[facility] 111-01 Encounter: [id number]  Code Status: Level 1 - Full Code    PCP: Yobani Duffy PA-C    Date of Admission:  7/23/2019 1730   Date of Service:  06/21/20  Patient Active Problem List   Diagnosis    COPD with asthma ([facility]    Tobacco use disorder, continuous    Compression fracture of L4 lumbar vertebra    Ventral hernia    Acute on chronic respiratory failure with hypoxia ([facility]    [facility], [facility]    Acquired hypothyroidism    Gastroesophageal reflux disease without esophagitis    Abnormal CT of the chest    Excessive cerumen in left ear canal    Lipoma of right upper extremity    Noncompliant with deep vein thrombosis (DVT) prophylaxis    At risk for aspiration    Ear ache    Tachycardia    Chest pain    Low HDL (under 40)     Diagnosis schizoaffective bipolar  Assessment    Overall Status:  Happy that she has a CSP meeting scheduled for next Wednesday  She had her [age] yesterday happy that her sister did talk to her along with her son  Still no shower in more than a week and somewhat disheveled continues to express some paranoid    Certification Statement to demonstrate a period of stability by attending to ADLs and becoming less psychosomatic in attending more groups Acceptance by patient:  Accepting  Radha Manuel in recovery:  Living at another personal care home   Understanding of medications:  Patient is aware about risks side effects benefits and precautions of medications   Involved in reintegration process:   On hold due to [address] in relationship with psychiatrist:  Trusting sometimes    Medication changes   None today  Non-pharmacological treatments   Continue with individual, group, milieu and occupational therapy using recovery principles and psycho-education about accepting illness and the need for treatment   Encourage to take showers twice a week and cooperate with treatment and adl skills as per her behav  Plan   Therapeutic passes on hold due to covid 19 lock down   Prepare for the [facility] and encouraged to accept  rather than refuse it   Reminded to attend at least 25% of groups on a daily basis including [facility]      Safety   Safety and communication plan established to target dynamic risk factors discussed above  Discharge Plan   · back to a [facility] at [address] [place] [address] to remain passive-aggressive about taking showers and still has to take a shower since last Sunday and states she will try today  She is wearing clean clothes but continues to have uncombed matted long hair  Remains suspicious about motives of staff members so she refuses to use oxygen concentrator claiming that the staff may tamper with the  She continues to verbalize several psychosomatic symptoms and needs frequent redirection and support  No voices reported and no overt no other psychotic symptoms but she appears to harbor some paranoia and questions them  She has not voiced any suicidal homicidal thoughts  She continues to believe that she is helpless in taking showers on her own without support and I have been return rating the fact that the staff would be willing to help and she needs to be more self-sufficient especially since she is going to the [facility] personal care home    Change in last 24 hours:  None significant  Sleep:   Fair  Appetite:   Fair but she picks and chooses her food  Compliance with medications:  Good  Side effects:   none but claims to feel tired sometimes  Review of systems:  Continues to have psychosomatic symptoms as usual  Group attendance:  Poor but improving  Significant changes:  None    Mental Status Exam  Appearance:    Found laying on bed but did get up when approached as usual   She appears not very well groomed today but continues to look older than her stated age, with uncombed hair and appears disheveled but wearing clean clothes   Anxious preoccupied  Has good eye contact   Suspicious in her interaction  Today she tells me she is going to take a shower tomorrow as usual and I reminded her that she needs to keep up with her hygiene  Behavior:    Superficially friendly pleasant but anxious suspicious and preoccupied  Speech:    tangential at times with tendency to become stilted   Mood:     anxious sad and irritated times,    Affect:    increased in intensity range mood congruent redirectable and her affect was brighter towards the end of the interview  Thought Process:   Circumstantial with tendency to have stilted speech   Thought Content:   Patient still expressed some paranoid about motives of staff switching her medications or tampering with her inhalant or her oxygen concentrator and giving her chloroform to kill her on the mask that was given to her off and on etc    No preoccupation with violence or suicide  No current suicidal homicidal thoughts intent or plans reported  No phobias but has obsessions and compulsions about examining her pills before she takes them and even questions if they are giving him too much medicine  Psychosomatic about dying by choking from food and from heart attack or from shortness of breath and now from catching Covid-19 which are all ongoing beliefs       Perceptual Disturbances:  Claims to hear voices but cannot decipher   Risk Potential:   Ability to care for herself and refusal to take showers  Sensorium:   fully oriented to place, person, time, date, day, month, year and to situation  Cognition:    Grossly intact, aware of current events like the corona virus situation, recent and remote memory intact     No language deficit  Consciousness:   Alert and awake  Attention and concentration:  fair  Intellect:    average  Insight:    limited  Judgment:    fair  Motor Activity:  No abnormal involuntary movement noted today    Vitals  Temp:  [96 8 °F (36 °C)-97 6 °F (36 4 °C)] 97 6 °F (36 4 °C)  HR:  [70-84] 84  Resp:  [18] 18  BP: ()/(56-57) 106/56  SpO2:  [94 %-98 %] 98 %  No intake or output data in the 24 hours ending 06/21/20 0832    Lab Results: No New Labs Available For Today  Results from last 7 days   Lab Units 06/19/20  0602   WBC Thousand/uL 8 00   RBC Million/uL 4 50   HEMOGLOBIN g/dL 14 4   HEMATOCRIT % 42 8   MCV fL 95   PLATELETS Thousands/uL 210   NEUTROS ABS Thousands/µL 3 90         Current Facility-Administered Medications:  acetaminophen 325 mg Oral Q6H PRN Deedee Muir MD   acetaminophen 650 mg Oral Q6H PRN Deedee Muir MD   acetaminophen 650 mg Oral Q8H PRN Deedee Muir MD   albuterol 2 puff Inhalation Q4H PRN Deedee Muir MD   aluminum-magnesium hydroxide-simethicone 15 mL Oral Q4H PRN Deedee Muir MD   ammonium lactate 1 application Topical BID PRBJORN Muir MD   benzonatate 100 mg Oral TID PRBJORN Muir MD   benztropine 1 mg Intramuscular Q8H PRN Deedee Muir MD   carbamide peroxide 5 drop Left Ear BID PRBJORN Muir MD   cloZAPine 25 mg Oral BID Deedee Muir MD   cloZAPine 50 mg Oral BID Deedee Muir MD   docusate sodium 100 mg Oral BID PRBJORN Muir MD   EPINEPHrine PF 0 15 mg Intramuscular Once PRN Deedee Muir MD   fluticasone-vilanterol 1 puff Inhalation Daily Deedee Muir MD   ketotifen 1 drop Right Eye BID PRBJORN Muir MD   levothyroxine 125 mcg Oral Early Morning Deedee Muir MD   magnesium hydroxide 30 mL Oral Daily PRN Deedee Muir MD   montelukast 10 mg Oral HS Deedee Muir MD   OLANZapine 5 mg Intramuscular Q8H PRN Deedee Muir MD   OLANZapine 5 mg Oral Q8H PRN Deedee Muir MD   ondansetron 4 mg Oral Q6H PRBJORN Miur MD   pantoprazole 40 mg Oral Early Morning Deedee Muir MD   polyethylene glycol 17 g Oral Daily PRN Deedee Muir MD   polyvinyl alcohol 1 drop Both Eyes Q3H PRN Deedee Muir MD   sertraline 200 mg Oral Daily Deedee Muir MD   sucralfate 1,000 mg Oral BID Rosario Jay MD   theophylline 200 mg Oral Daily Deep Durand MD   tiotropium 18 mcg Inhalation Daily Deep Durand MD   traZODone 25 mg Oral HS PRN Deep Durand MD       Counseling / Coordination of Care: Total floor / unit time spent today 15 minutes  Greater than 50% of total time was spent with the patient and / or family counseling and / or somewhat receptive to supportive listening and teaching positive coping skills to deal with symptom mangement  Patient's Rights, confidentiality and exceptions to confidentiality, use of automated medical record, [facility] staff access to medical record, and consent to treatment reviewed

## 2020-06-21 NOTE — PLAN OF CARE
Problem: Alteration in Thoughts and Perception  Goal: Verbalize thoughts and feelings  Description  Interventions:  - Promote a nonjudgmental and trusting relationship with the patient through active listening and therapeutic communication  - Assess patient's level of functioning, behavior and potential for risk  - Engage patient in 1 on 1 interactions for a minimum of 15 minutes each session  - Encourage patient to express fears, feelings, frustrations, and discuss symptoms    - [place] patient to reality, help patient recognize reality-based thinking   - Administer medications as ordered and assess for potential side effects  - Provide the patient education related to the signs and symptoms of the illness and desired effects of prescribed medications  Outcome: Progressing  Goal: Agree to be compliant with medication regime, as prescribed and report medication side effects  Description  Interventions:  - Offer appropriate PRN medication and supervise ingestion; conduct aims, as needed   Outcome: Progressing     Problem: Depression  Goal: Refrain from isolation  Description  Interventions:  - Develop a trusting relationship   - Encourage socialization   Outcome: Progressing     Problem: Alteration in Thoughts and Perception  Goal: Attend and participate in unit activities, including therapeutic, recreational, and educational groups  Description  Interventions:  - Provide therapeutic and educational activities daily, encourage attendance and participation, and document same in the medical record     CERTIFIED PEER SPECIALIST INTERVENTIONS:    Complete peer assessment with patient to assess their needs and identify their goals to complete while in the recovery program as well as once discharged into the community  Patient will complete WRAP Plan, Crisis Plan and 5 Life Domains  Patient will attend 50% of groups offered on the unit  Patient will complete a goal card weekly      Outcome: Not Progressing  Goal: Complete daily ADLs, including personal hygiene independently, as able  Description  Interventions:  - Observe, teach, and assist patient with ADLS  - Monitor and promote a balance of rest/activity, with adequate nutrition and elimination   Outcome: Not Progressing     [address] Pr[address] Long is pleasant, but, remains disheveled, no attention to hygiene in 5 days  She has come out of room to make & receive phone calls from family members on her birthday  Smiled & was touched when peers spontaneously sang her [de-identified] Margaux Houston & she received a cupcake for the occasion  She did come for supper medicine, ate 100% of meal (baked potato & baked tomato) plus the cupcake & an Ensure  She has not responded to invitations to evening programming offered

## 2020-06-21 NOTE — NURSING NOTE
Patient is in her bed  Appears to have slept all night  Laying in bed quietly  Eyes closed  1 liter of oxygen  No distress noted  Monitored on q 7 minute checks  No needs during the night

## 2020-06-21 NOTE — PLAN OF CARE
Problem: Alteration in Thoughts and Perception  Goal: Verbalize thoughts and feelings  Description  Interventions:  - Promote a nonjudgmental and trusting relationship with the patient through active listening and therapeutic communication  - Assess patient's level of functioning, behavior and potential for risk  - Engage patient in 1 on 1 interactions for a minimum of 15 minutes each session  - Encourage patient to express fears, feelings, frustrations, and discuss symptoms    - [place] patient to reality, help patient recognize reality-based thinking   - Administer medications as ordered and assess for potential side effects  - Provide the patient education related to the signs and symptoms of the illness and desired effects of prescribed medications  Outcome: Progressing  Goal: Agree to be compliant with medication regime, as prescribed and report medication side effects  Description  Interventions:  - Offer appropriate PRN medication and supervise ingestion; conduct aims, as needed   Outcome: Progressing     Problem: Alteration in Orientation  Goal: Interact with staff daily  Description  Interventions:  - Assess and re-assess patient's level of orientation  - Engage patient in 1 on 1 interactions, daily, for a minimum of 15 minutes   - Establish rapport/trust with patient   Outcome: Progressing     Problem: SAFETY ADULT  Goal: Patient will remain free of falls  Description  INTERVENTIONS:  - Assess patient frequently for physical needs  -  Identify cognitive and physical deficits and behaviors that affect risk of falls    -  [place] fall precautions as indicated by assessment   - Educate patient/family on patient safety including physical limitations  - Instruct patient to call for assistance with activity based on assessment  - Modify environment to reduce risk of injury  - Consider OT/PT consult to assist with strengthening/mobility  Outcome: Progressing     Problem: Alteration in Thoughts and Perception  Goal: Attend and participate in unit activities, including therapeutic, recreational, and educational groups  Description  Interventions:  - Provide therapeutic and educational activities daily, encourage attendance and participation, and document same in the medical record     CERTIFIED PEER SPECIALIST INTERVENTIONS:    Complete peer assessment with patient to assess their needs and identify their goals to complete while in the recovery program as well as once discharged into the community  Patient will complete WRAP Plan, Crisis Plan and 5 Life Domains  Patient will attend 50% of groups offered on the unit  Patient will complete a goal card weekly  Outcome: Not Progressing  Goal: Complete daily ADLs, including personal hygiene independently, as able  Description  Interventions:  - Observe, teach, and assist patient with ADLS  - Monitor and promote a balance of rest/activity, with adequate nutrition and elimination   Outcome: Not Progressing     Problem: Depression  Goal: Refrain from isolation  Description  Interventions:  - Develop a trusting relationship   - Encourage socialization   Outcome: Not Progressing  Goal: Refrain from self-neglect  Outcome: Not Kenny Jeff has been isolative to her room and self most of the day  Lays in bed and naps at times  Will socialize with peers when out for meals  Pleasant and cooperative upon approach  Took medication and ate 50% of her meal without swallowing difficulty  Did not participate in any groups  No shower or BM this shift  Disheveled appearance  Poor hygiene  Has not been somatic  Did not eat lunch  Continue to monitor  Precautions maintained

## 2020-06-22 NOTE — PROGRESS NOTES
06/22/20 0900   Activity/Group Checklist   Group Community meeting   Attendance Did not attend   Attendance Duration (min) 31-45   Affect/Mood IRMA

## 2020-06-22 NOTE — PROGRESS NOTES
06/22/20 1100   Activity/Group Checklist   Group   (IMR/Graduation )   Attendance Did not attend   Attendance Duration (min) 31-45   Affect/Mood IRMA

## 2020-06-22 NOTE — PROGRESS NOTES
Psychiatry Progress Note [address] 63 y o  female MRN: [id number]  Unit/Bed#: [facility]IO [facility] 111-01 Encounter: [id number]  Code Status: Level 1 - Full Code    PCP: Trish Alfaro PA-C    Date of Admission:  7/23/2019 1163   Date of Service:  06/22/20  Patient Active Problem List   Diagnosis    [facility]    Tobacco use disorder, continuous    Compression fracture of L4 lumbar vertebra    Ventral hernia    Acute on chronic respiratory failure with hypoxia ([facility]    [facility], [facility]    Acquired hypothyroidism    Gastroesophageal reflux disease without esophagitis    Abnormal CT of the chest    Excessive cerumen in left ear canal    Lipoma of right upper extremity    Noncompliant with deep vein thrombosis (DVT) prophylaxis    At risk for aspiration    Ear ache    Tachycardia    Chest pain    Low HDL (under 40)     Diagnosis schizoaffective bipolar  Assessment    Overall Status:  Again did not take shower for one week despite promising she was going to last evening,still paranoid, psychosomatic, preoccupied, anxious with poor grooming     Certification Statement to demonstrate a period of stability by attending to ADLs and becoming less psychosomatic in attending more groups    Acceptance by patient:  Accepting  Otyeseniala Sell in recovery:  Living at another personal care home   Understanding of medications:  Patient is aware about risks side effects benefits and precautions of medications   Involved in reintegration process: On hold due to [address] in relationship with psychiatrist:  Trusting sometimes    Medication changes   None today  Non-pharmacological treatments   Continue with individual, group, milieu and occupational therapy using recovery principles and psycho-education about accepting illness and the need for treatment     Encourage to take showers twice a week and cooperate with treatment and adl skills as per her behav  Plan   Therapeutic passes on hold due to covid 19 lock down   Prepare for the [facility] and encouraged to accept  rather than refuse it   Reminded to attend at least 25% of groups on a daily basis including IMR      Safety   Safety and communication plan established to target dynamic risk factors discussed above  Discharge Plan   · back to a [facility] [place]    Interval Progress  Again passive aggressive, not taking showers since over a week ago despite promising daily that she would take a shower and then decided not to, despite staff approaching her several times  Continues to have uncombed matted hair and remains poorly groomed  Stil paranoid and will not use the oxygen concentrator accusing night shift of tampering with it  Again examines her meds before consuming them and still with fears that she is dying from SOB, or heart attack or from choking on food but is responsive to support and redirection  Reports no voices lately  Not s/h lately and compliant with most of the meds and still reluctant to change any of her meds  Change in last 24 hours:  None significant  Sleep:   Fair  Appetite:   Fair but she picks and chooses her food  Compliance with medications:  Good  Side effects:   none but claims to feel tired sometimes  Review of systems:  Continues to have psychosomatic symptoms as usual  Group attendance:  Poor but improving  Significant changes:  None    Mental Status Exam  Appearance:    Found laying on bed but did get up when approached as usual   She appears not very well groomed today but continues to look older than her stated age, with uncombed hair and appears disheveled but wearing clean clothes   Anxious preoccupied  Has good eye contact   Suspicious in her interaction    Today she tells me she is going to take a shower tomorrow as usual and I reminded her that she needs to keep up with her hygiene and work with the program to be able to get out as plan  Behavior:    Superficially friendly pleasant but anxious suspicious and preoccupied  Speech:    tangential at times with tendency to become stilted   Mood:     anxious sad and irritated times,    Affect:    increased in intensity range mood congruent redirectable and her affect was brighter at times  Thought Process:   Circumstantial with tendency to have stilted speech   Thought Content:   Patient continues to accept some paranoia about motives of staff switching her medications or tampering with her inhalant or her oxygen concentrator and giving her chloroform to kill her on the mask that was given to her off and on etc    No preoccupation with violence or suicide  No current suicidal homicidal thoughts intent or plans reported  No phobias but has obsessions and compulsions about examining her pills before she takes them and even questions if they are giving him too much medicine  Psychosomatic about dying by choking from food and from heart attack or from shortness of breath and now from catching Covid-19 which are all ongoing beliefs       Perceptual Disturbances:  Claims to hear voices but cannot decipher   Risk Potential:   Ability to care for herself and refusal to take showers  Sensorium:   fully oriented to place, person, time, date, day, month, year and to situation  Cognition:    Grossly intact, aware of current events like the corona virus situation, recent and remote memory intact     No language deficit  Consciousness:   Alert and awake  Attention and concentration:  fair  Intellect:    average  Insight:    limited  Judgment:    fair  Motor Activity:  No abnormal involuntary movement noted today    Vitals  Temp:  [97 2 °F (36 2 °C)-97 7 °F (36 5 °C)] 97 7 °F (36 5 °C)  HR:  [66-70] 70  Resp:  [16-18] 18  BP: ()/(54-78) 112/78  SpO2:  [92 %] 92 %  No intake or output data in the 24 hours ending 06/22/20 0831    Lab Results: No New Labs Available For Today  Results from last 7 days   Lab Units 06/19/20  0602   WBC Thousand/uL 8 00   RBC Million/uL 4 50   HEMOGLOBIN g/dL 14 4   HEMATOCRIT % 42 8   MCV fL 95   PLATELETS Thousands/uL 210   NEUTROS ABS Thousands/µL 3 90         Current Facility-Administered Medications:  acetaminophen 325 mg Oral Q6H PRN Jill Gayle MD   acetaminophen 650 mg Oral Q6H PRN Jill Gayle MD   acetaminophen 650 mg Oral Q8H PRN Jill Gayle MD   albuterol 2 puff Inhalation Q4H PRN Jill Gayle MD   aluminum-magnesium hydroxide-simethicone 15 mL Oral Q4H PRN Jill Gayle MD   ammonium lactate 1 application Topical BID PRN Jill Gayle MD   benzonatate 100 mg Oral TID PRN Jill Gayle MD   benztropine 1 mg Intramuscular Q8H PRN Jill Gayle MD   carbamide peroxide 5 drop Left Ear BID PRN Jill Gayle MD   cloZAPine 25 mg Oral BID Jill Gayle MD   cloZAPine 50 mg Oral BID Jill Gayle MD   docusate sodium 100 mg Oral BID PRN Jill Gayle MD   EPINEPHrine PF 0 15 mg Intramuscular Once PRN Jill Gayle MD   fluticasone-vilanterol 1 puff Inhalation Daily Jill Gayle MD   ketotifen 1 drop Right Eye BID PRN Jill Gayle MD   levothyroxine 125 mcg Oral Early Morning Jill Gayle MD   magnesium hydroxide 30 mL Oral Daily PRN Jill Gayle MD   montelukast 10 mg Oral HS Jill Gayle MD   OLANZapine 5 mg Intramuscular Q8H PRN Jill Gayle MD   OLANZapine 5 mg Oral Q8H PRN Jill Gayle MD   ondansetron 4 mg Oral Q6H PRN Jill Gayle MD   pantoprazole 40 mg Oral Early Morning Jill Gayle MD   polyethylene glycol 17 g Oral Daily PRN Jill Gayle MD   polyvinyl alcohol 1 drop Both Eyes Q3H PRN Jill Gayle MD   sertraline 200 mg Oral Daily Jill Gayle MD   sucralfate 1,000 mg Oral BID Howard Russ MD   theophylline 200 mg Oral Daily Jill Gayle MD   tiotropium 18 mcg Inhalation Daily Jill Gayle MD   traZODone 25 mg Oral HS PRN Jill Gayle MD       Counseling / Coordination of Care: Total floor / unit time spent today 15 minutes   Greater than 50% of total time was spent with the patient and / or family counseling and / or somewhat receptive to supportive listening and teaching positive coping skills to deal with symptom mangement  Patient's Rights, confidentiality and exceptions to confidentiality, use of automated medical record, [facility] staff access to medical record, and consent to treatment reviewed

## 2020-06-22 NOTE — PLAN OF CARE
Problem: Alteration in Thoughts and Perception  Goal: Attend and participate in unit activities, including therapeutic, recreational, and educational groups  Description  Interventions:  - Provide therapeutic and educational activities daily, encourage attendance and participation, and document same in the medical record     CERTIFIED PEER SPECIALIST INTERVENTIONS:    Complete peer assessment with patient to assess their needs and identify their goals to complete while in the recovery program as well as once discharged into the community  Patient will complete WRAP Plan, Crisis Plan and 5 Life Domains  Patient will attend 50% of groups offered on the unit  Patient will complete a goal card weekly  Patient has not been attending groups and is currently at 13%  Patient was able to complete WRAP Plan and BH Relapse Prevention plan  Patient has been prompted numerous times to complete Goal Cards and Life Domains but shows no interest in working on a recovery orientated lifestyle

## 2020-06-22 NOTE — PROGRESS NOTES
Natalie Lopes has been asleep for the entirety of the shift, she did not tamper with O2 @ 1 liter  Med compliant for her 0600 medications  No complaints of discomfort reported  Will continue to monitor

## 2020-06-22 NOTE — PROGRESS NOTES
06/22/20 2446   Team Meeting   Meeting Type Daily Rounds   Initial Conference Date 06/22/20   Patient/Family Present   Patient Present No   Patient's Family Present No     Daily Rounds Documentation     Team Members Present:   DIANA Westbrook, SHANIKA Everett, RN    Very minimal group participation over the past week  Last showered on 6/15/2020

## 2020-06-22 NOTE — PLAN OF CARE
Problem: Alteration in Thoughts and Perception  Goal: Attend and participate in unit activities, including therapeutic, recreational, and educational groups  Description  Interventions:  - Provide therapeutic and educational activities daily, encourage attendance and participation, and document same in the medical record     CERTIFIED PEER SPECIALIST INTERVENTIONS:    Complete peer assessment with patient to assess their needs and identify their goals to complete while in the recovery program as well as once discharged into the community  Patient will complete WRAP Plan, Crisis Plan and 5 Life Domains  Patient will attend 50% of groups offered on the unit  Patient will complete a goal card weekly  Outcome: Not Progressing    Patient did not complete goal card for the week of 6/22/2020

## 2020-06-22 NOTE — CASE MANAGEMENT
Introduced CSP document to patient, reviewing fields she can complete and fill-in herself  Encouraged patient to complete as much as she can independently and take responsibility; CM will follow up with her to finish the document prior to meeting Wednesday  Will complete and send out document to all involved parties

## 2020-06-22 NOTE — PROGRESS NOTES
[address] presents as wan & listless tonight  Did not attend PM Group  Did not perform Incentive Spirometry; "It's not good to do when you first wake up " (Which is exactly when she does do it most evenings ) Came out for HS medicine except the Singulair  Had only a beverage for HS snack  Wearing now her QHS nasal O2 @ 1L for bed (without the humidification bottle as fears 11-7 will tamper w/bottle)

## 2020-06-22 NOTE — PLAN OF CARE
Problem: Alteration in Thoughts and Perception  Goal: Treatment Goal: Gain control of psychotic behaviors/thinking, reduce/eliminate presenting symptoms and demonstrate improved reality functioning upon discharge  Outcome: Progressing  Goal: Verbalize thoughts and feelings  Description  Interventions:  - Promote a nonjudgmental and trusting relationship with the patient through active listening and therapeutic communication  - Assess patient's level of functioning, behavior and potential for risk  - Engage patient in 1 on 1 interactions for a minimum of 15 minutes each session  - Encourage patient to express fears, feelings, frustrations, and discuss symptoms    - [place] patient to reality, help patient recognize reality-based thinking   - Administer medications as ordered and assess for potential side effects  - Provide the patient education related to the signs and symptoms of the illness and desired effects of prescribed medications  Outcome: Progressing  Goal: Agree to be compliant with medication regime, as prescribed and report medication side effects  Description  Interventions:  - Offer appropriate PRN medication and supervise ingestion; conduct aims, as needed   Outcome: Progressing  Goal: Recognize dysfunctional thoughts, communicate reality-based thoughts at the time of discharge  Description  Interventions:  - Provide medication and psycho-education to assist patient in compliance and developing insight into his/her illness   Outcome: Progressing     Problem: Ineffective Coping  Goal: Patient/Family verbalizes awareness of resources  Outcome: Progressing  Goal: Understands least restrictive measures  Description  Interventions:  - Utilize least restrictive behavior  Outcome: Progressing     Problem: Risk for Self Injury/Neglect  Goal: Treatment Goal: Remain safe during length of stay, learn and adopt new coping skills, and be free of self-injurious ideation, impulses and acts at the time of discharge  Outcome: Progressing  Goal: Verbalize thoughts and feelings  Description  Interventions:  - Assess and re-assess patient's lethality and potential for self-injury  - Engage patient in 1:1 interactions, daily, for a minimum of 15 minutes  - Encourage patient to express feelings, fears, frustrations, hopes  - Establish rapport/trust with patient   Outcome: Progressing  Goal: Refrain from harming self  Description  Interventions:  - Monitor patient closely, per order  - Develop a trusting relationship  - Supervise medication ingestion, monitor effects and side effects   Outcome: Progressing     Problem: Depression  Goal: Treatment Goal: Demonstrate behavioral control of depressive symptoms, verbalize feelings of improved mood/affect, and adopt new coping skills prior to discharge  Outcome: Progressing  Goal: Verbalize thoughts and feelings  Description  Interventions:  - Assess and re-assess patient's level of risk   - Engage patient in 1:1 interactions, daily, for a minimum of 15 minutes   - Encourage patient to express feelings, fears, frustrations, hopes   Outcome: Progressing     Problem: Anxiety  Goal: Anxiety is at manageable level  Description  Interventions:  - Assess and monitor patient's anxiety level  - Monitor for signs and symptoms of anxiety both physical and emotional (heart palpitations, chest pain, shortness of breath, headaches, nausea, feeling jumpy, restlessness, irritable, apprehensive)  - Collaborate with interdisciplinary team and initiate plan and interventions as ordered    - [place] patient to unit/surroundings  - Explain treatment plan  - Encourage participation in care  - Encourage verbalization of concerns/fears  - Identify coping mechanisms  - Assist in developing anxiety-reducing skills  - Administer/offer alternative therapies  - Limit or eliminate stimulants  Outcome: Progressing     Problem: Alteration in Orientation  Goal: Interact with staff daily  Description  Interventions:  - Assess and re-assess patient's level of orientation  - Engage patient in 1 on 1 interactions, daily, for a minimum of 15 minutes   - Establish rapport/trust with patient   Outcome: Progressing     Problem: PAIN - ADULT  Goal: Verbalizes/displays adequate comfort level or baseline comfort level  Description  Interventions:  - Encourage patient to monitor pain and request assistance  - Assess pain using appropriate pain scale  - Administer analgesics based on type and severity of pain and evaluate response  - Implement non-pharmacological measures as appropriate and evaluate response  - Consider cultural and social influences on pain and pain management  - Notify physician/advanced practitioner if interventions unsuccessful or patient reports new pain  Outcome: Progressing     Problem: SAFETY ADULT  Goal: Patient will remain free of falls  Description  INTERVENTIONS:  - Assess patient frequently for physical needs  -  Identify cognitive and physical deficits and behaviors that affect risk of falls  -  [place] fall precautions as indicated by assessment   - Educate patient/family on patient safety including physical limitations  - Instruct patient to call for assistance with activity based on assessment  - Modify environment to reduce risk of injury  - Consider OT/PT consult to assist with strengthening/mobility  Outcome: Progressing     Problem: Nutrition/Hydration-ADULT  Goal: Nutrient/Hydration intake appropriate for improving, restoring or maintaining nutritional needs  Description  Monitor and assess patient's nutrition/hydration status for malnutrition  Collaborate with interdisciplinary team and initiate plan and interventions as ordered  Monitor patient's weight and dietary intake as ordered or per policy  Utilize nutrition screening tool and intervene as necessary  Determine patient's food preferences and provide high-protein, high-caloric foods as appropriate  INTERVENTIONS:  - Monitor oral intake, urinary output, labs, and treatment plans  - Assess nutrition and hydration status and recommend course of action  - Evaluate amount of meals eaten  - Assist patient with eating if necessary   - Allow adequate time for meals  - Recommend/ encourage appropriate diets, oral nutritional supplements, and vitamin/mineral supplements  - Order, calculate, and assess calorie counts as needed  - Recommend, monitor, and adjust tube feedings and TPN/PPN based on assessed needs  - Assess need for intravenous fluids  - Provide specific nutrition/hydration education as appropriate  - Include patient/family/caregiver in decisions related to nutrition  Outcome: Progressing     Problem: Alteration in Thoughts and Perception  Goal: Attend and participate in unit activities, including therapeutic, recreational, and educational groups  Description  Interventions:  - Provide therapeutic and educational activities daily, encourage attendance and participation, and document same in the medical record     CERTIFIED PEER SPECIALIST INTERVENTIONS:    Complete peer assessment with patient to assess their needs and identify their goals to complete while in the recovery program as well as once discharged into the community  Patient will complete WRAP Plan, Crisis Plan and 5 Life Domains  Patient will attend 50% of groups offered on the unit  Patient will complete a goal card weekly      Outcome: Not Progressing  Goal: Complete daily ADLs, including personal hygiene independently, as able  Description  Interventions:  - Observe, teach, and assist patient with ADLS  - Monitor and promote a balance of rest/activity, with adequate nutrition and elimination   Outcome: Not Progressing     Problem: Ineffective Coping  Goal: Participates in unit activities  Description  Interventions:  - Provide therapeutic environment   - Provide required programming   - Redirect inappropriate behaviors   Outcome: Not Progressing     Problem: Risk for Self Injury/Neglect  Goal: Attend and participate in unit activities, including therapeutic, recreational, and educational groups  Description  Interventions:  - Provide therapeutic and educational activities daily, encourage attendance and participation, and document same in the medical record  - Obtain collateral information, encourage visitation and family involvement in care   Outcome: Not Progressing  Goal: Complete daily ADLs, including personal hygiene independently, as able  Description  Interventions:  - Observe, teach, and assist patient with ADLS  - Monitor and promote a balance of rest/activity, with adequate nutrition and elimination  Outcome: Not Progressing     Problem: Depression  Goal: Refrain from isolation  Description  Interventions:  - Develop a trusting relationship   - Encourage socialization   Outcome: Not Progressing  Goal: Refrain from self-neglect  Outcome: Not Progressing   Mostly isolative to her room, resting / sleeping in her bed  Out of room for meds and meals  Did not attend any of the offered groups  Did not shower or do hygiene and looks disheveled  Ate 100% of breakfast and 100% of lunch  Med compliant  No other behaviors or issues noted, no somatic complaints or delusions voiced  Continue to monitor

## 2020-06-23 NOTE — PROGRESS NOTES
Psychiatry Progress Note [address] 63 y o  female MRN: [id number]  Unit/Bed#: [facility] 111-01 Encounter: [id number]  Code Status: Level 1 - Full Code    PCP: Srinivasan Charles PA-C    Date of Admission:  7/23/2019 1730   Date of Service:  06/23/20  Patient Active Problem List   Diagnosis    COPD with asthma ([facility] [address] [facility]    Tobacco use disorder, continuous    Compression fracture of L4 lumbar vertebra    Ventral hernia    Acute on chronic respiratory failure with hypoxia ([facility] [address] )    Schizoaffective disorder, bipolar type ([facility] [address] )    Acquired hypothyroidism    Gastroesophageal reflux disease without esophagitis    Abnormal CT of the chest    Excessive cerumen in left ear canal    Lipoma of right upper extremity    Noncompliant with deep vein thrombosis (DVT) prophylaxis    At risk for aspiration    Ear ache    Tachycardia    Chest pain    Low HDL (under 40)     Diagnosis schizoaffective bipolar  Assessment    Overall Status:  Did take a shower finally with staff assistance yesterday but still somewhat suspicious paranoid psychosomatic as usual but happy about the CSP meeting coming up tomorrow  [place] to demonstrate a period of stability by attending to ADLs and becoming less psychosomatic in attending more groups    Acceptance by patient:  Accepting  Ray Wick in recovery:  Living at another personal care home   Understanding of medications:  Patient is aware about risks side effects benefits and precautions of medications   Involved in reintegration process: On hold due to [address] in relationship with psychiatrist:  Trusting sometimes    Medication changes   None today  Non-pharmacological treatments   Continue with individual, group, milieu and occupational therapy using recovery principles and psycho-education about accepting illness and the need for treatment     Encourage to take showers twice a week and cooperate with treatment and adl skills as per her behav  Plan   Therapeutic passes on hold due to covid 19 lock down   Prepare for the [place] [facility] and encouraged to accept  rather than refuse it   Reminded to attend at least 25% of groups on a daily basis including [facility]      Safety   Safety and communication plan established to target dynamic risk factors discussed above  Discharge Plan   · back to a [facility] at [address]  Patient finally did take a shower with staff assistance yesterday after about a week and is proud about disclosing it to me when approached  She continues to appear somewhat suspicious paranoid not using the oxygen concentrator but only the nurse and can laugh or the oxygen at night out of here that night she is going to tamper with the oxygen concentrator  She personally examines her medications before consuming them and continues to express fears of dying from heart attack or choking on food or shortness of breath or from corona as usual but is usually receptive to support and reassurance and acknowledging that she is experiencing these symptoms which she now and labels as "psychosomatic"  She is trying to attend some groups and now willing [facility] last [facility]:  Did take a shower  Sleep:   Fair  Appetite:   Fair but she picks and chooses her food  Compliance with medications:  Good  Side effects:   none but claims to feel tired sometimes  Review of systems:  Continues to have psychosomatic symptoms as usual  Group attendance:  Poor but improving  Significant changes:  None    Mental Status Exam  Appearance:    Did attend team meeting today   She appears  very well groomed today but continues to look older than her stated age, with combed hair and  wearing clean clothes   Has good eye contact   Suspicious in her interaction      Behavior:    Friendly pleasant but anxious suspicious and preoccupied  Speech:    tangential at times with tendency to become stilted Mood:     anxious sad with tendency to be irritated at times,    Affect:    increased in intensity range mood congruent redirectable and her affect was brighter at times  Thought Process:   Circumstantial with tendency to have stilted speech   Thought Content:   Patient continues to report some paranoia about motives of staff switching her medications or tampering with her inhalant or her oxygen concentrator   No current suicidal homicidal thoughts intent or plans reported  No phobias but has obsessions and compulsions about examining her pills before she takes them  Psychosomatic about dying by choking from food and from heart attack or from shortness of breath and now from catching Covid-19 which are all ongoing beliefs and now verbalizes she has "psychosomatic " sxs  She no longer thinks  her mother is coming here to harm her and is receptive to verbal reassuarance and support and redirection    Perceptual Disturbances:  Claims to hear voices but cannot decipher other than from a lady    Risk Potential:   Ability to care for herself and refusal to take showers  Sensorium:   fully oriented to place, person, time, date, day, month, year and to situation  Cognition:    Grossly intact, aware of current events like the corona virus situation, recent and remote memory grossly intact     No language deficit  Consciousness:   Alert and awake  Attention and concentration:  fair  Intellect:    average  Insight:    improving  Judgment:    fair  Motor Activity:  No abnormal involuntary movement noted today    Vitals  Temp:  [97 1 °F (36 2 °C)] 97 1 °F (36 2 °C)  HR:  [91] 91  Resp:  [14] 14  BP: (98)/(64) 98/64  SpO2:  [93 %] 93 %  No intake or output data in the 24 hours ending 06/23/20 0755    Lab Results: No New Labs Available For Today  Results from last 7 days   Lab Units 06/19/20  0602   WBC Thousand/uL 8 00   RBC Million/uL 4 50   HEMOGLOBIN g/dL 14 4   HEMATOCRIT % 42 8   MCV fL 95   PLATELETS Thousands/uL 210 NEUTROS ABS Thousands/µL 3 90         Current Facility-Administered Medications:  acetaminophen 325 mg Oral Q6H PRN Deedee Muir MD   acetaminophen 650 mg Oral Q6H PRBJORN Muir MD   acetaminophen 650 mg Oral Q8H PRN Deedee Muir MD   albuterol 2 puff Inhalation Q4H PRN Deedee Muir MD   aluminum-magnesium hydroxide-simethicone 15 mL Oral Q4H PRN Deedee Muir MD   ammonium lactate 1 application Topical BID PRN Deedee Muir MD   benzonatate 100 mg Oral TID PRN Deedee Muir MD   benztropine 1 mg Intramuscular Q8H PRN Deedee Muir MD   carbamide peroxide 5 drop Left Ear BID PRBJORN Muir MD   cloZAPine 25 mg Oral BID Deedee Muir MD   cloZAPine 50 mg Oral BID Deedee Muir MD   docusate sodium 100 mg Oral BID PRN Deedee Muir MD   EPINEPHrine PF 0 15 mg Intramuscular Once PRN Deedee Muir MD   fluticasone-vilanterol 1 puff Inhalation Daily Deedee Muir MD   ketotifen 1 drop Right Eye BID PRN Deedee Muir MD   levothyroxine 125 mcg Oral Early Morning Deedee Muir MD   magnesium hydroxide 30 mL Oral Daily PRN Deedee Muir MD   montelukast 10 mg Oral HS Deedee Muir MD   OLANZapine 5 mg Intramuscular Q8H PRN Deedee Muir MD   OLANZapine 5 mg Oral Q8H PRN Deedee Muir MD   ondansetron 4 mg Oral Q6H PRN Deedee Muir MD   pantoprazole 40 mg Oral Early Morning Deedee Muir MD   polyethylene glycol 17 g Oral Daily PRN Deedee Muir MD   polyvinyl alcohol 1 drop Both Eyes Q3H PRN Deedee Muir MD   sertraline 200 mg Oral Daily Deedee Muir MD   sucralfate 1,000 mg Oral BID Juan Carlos Merchant MD   theophylline 200 mg Oral Daily Deedee Muir MD   tiotropium 18 mcg Inhalation Daily Deedee Muir MD   traZODone 25 mg Oral HS PRN Deedee Muir MD       Counseling / Coordination of Care: Total floor / unit time spent today 15 minutes   Greater than 50% of total time was spent with the patient and / or family counseling and / or somewhat receptive to supportive listening and teaching positive coping skills to deal with symptom tarun  Patient's Rights, confidentiality and exceptions to confidentiality, use of automated medical record, [address] staff access to medical record, and consent to treatment reviewed

## 2020-06-23 NOTE — PLAN OF CARE
Problem: Alteration in Thoughts and Perception  Goal: Agree to be compliant with medication regime, as prescribed and report medication side effects  Description  Interventions:  - Offer appropriate PRN medication and supervise ingestion; conduct aims, as needed   Outcome: Progressing  Goal: Complete daily ADLs, including personal hygiene independently, as able  Description  Interventions:  - Observe, teach, and assist patient with ADLS  - Monitor and promote a balance of rest/activity, with adequate nutrition and elimination   Outcome: Progressing     Problem: Alteration in Thoughts and Perception  Goal: Attend and participate in unit activities, including therapeutic, recreational, and educational groups  Description  Interventions:  - Provide therapeutic and educational activities daily, encourage attendance and participation, and document same in the medical record     CERTIFIED PEER SPECIALIST INTERVENTIONS:    Complete peer assessment with patient to assess their needs and identify their goals to complete while in the recovery program as well as once discharged into the community  Patient will complete WRAP Plan, Crisis Plan and 5 Life Domains  Patient will attend 50% of groups offered on the unit  Patient will complete a goal card weekly  Outcome: Not Progressing     Problem: Depression  Goal: Refrain from isolation  Description  Interventions:  - Develop a trusting relationship   - Encourage socialization   Outcome: Not Progressing     Problem: Nutrition/Hydration-ADULT  Goal: Nutrient/Hydration intake appropriate for improving, restoring or maintaining nutritional needs  Description  Monitor and assess patient's nutrition/hydration status for malnutrition  Collaborate with interdisciplinary team and initiate plan and interventions as ordered  Monitor patient's weight and dietary intake as ordered or per policy  Utilize nutrition screening tool and intervene as necessary   Determine patient's food preferences and provide high-protein, high-caloric foods as appropriate  INTERVENTIONS:  - Monitor oral intake, urinary output, labs, and treatment plans  - Assess nutrition and hydration status and recommend course of action  - Evaluate amount of meals eaten  - Assist patient with eating if necessary   - Allow adequate time for meals  - Recommend/ encourage appropriate diets, oral nutritional supplements, and vitamin/mineral supplements  - Order, calculate, and assess calorie counts as needed  - Recommend, monitor, and adjust tube feedings and TPN/PPN based on assessed needs  - Assess need for intravenous fluids  - Provide specific nutrition/hydration education as appropriate  - Include patient/family/caregiver in decisions related to nutrition  Outcome: Not Progressing    [address] was motivated to shower & groom this evening  With prompting, she even got her own hygiene bin & supplies  Was reluctant to wash hair, but, did comply  Was given assistance w/hair, but, did the rest herself  Came for supper medicine, ate 50% of meal (baked potato only) as was too fatigued from her shower to eat more; did have an Ensure  She is pleasant, but, isolative to room & bed in free time  Has not responded to invitations to optional [employer] or PM Group

## 2020-06-23 NOTE — PROGRESS NOTES
06/23/20 1400   Activity/Group Checklist   Group   (Recovery Workshop )   Attendance Attended   Attendance Duration (min) 46-60   Interactions Did not interact   Affect/Mood Appropriate;Calm;Constricted   Goals Achieved Able to listen to others; Able to engage in interactions

## 2020-06-23 NOTE — PROGRESS NOTES
Maggie maintained on ongoing fall and SAFE precaution  Jennifer Davis in bed with eyes closed, breath even and unlabored with 1L of o2 via nasal cannula  ,    Continues rounding implemented   No somatic complaint overnight  No PRN needed for sleep aid   No indication of pain or discomfort  Will continue to monitor  Karrie Nissen

## 2020-06-23 NOTE — PLAN OF CARE
Problem: Alteration in Thoughts and Perception  Goal: Treatment Goal: Gain control of psychotic behaviors/thinking, reduce/eliminate presenting symptoms and demonstrate improved reality functioning upon discharge  Outcome: Progressing  Goal: Verbalize thoughts and feelings  Description  Interventions:  - Promote a nonjudgmental and trusting relationship with the patient through active listening and therapeutic communication  - Assess patient's level of functioning, behavior and potential for risk  - Engage patient in 1 on 1 interactions for a minimum of 15 minutes each session  - Encourage patient to express fears, feelings, frustrations, and discuss symptoms    - [place] patient to reality, help patient recognize reality-based thinking   - Administer medications as ordered and assess for potential side effects  - Provide the patient education related to the signs and symptoms of the illness and desired effects of prescribed medications  Outcome: Progressing  Goal: Agree to be compliant with medication regime, as prescribed and report medication side effects  Description  Interventions:  - Offer appropriate PRN medication and supervise ingestion; conduct aims, as needed   Outcome: Progressing  Goal: Attend and participate in unit activities, including therapeutic, recreational, and educational groups  Description  Interventions:  - Provide therapeutic and educational activities daily, encourage attendance and participation, and document same in the medical record     CERTIFIED PEER SPECIALIST INTERVENTIONS:    Complete peer assessment with patient to assess their needs and identify their goals to complete while in the recovery program as well as once discharged into the community  Patient will complete WRAP Plan, Crisis Plan and 5 Life Domains  Patient will attend 50% of groups offered on the unit  Patient will complete a goal card weekly      Outcome: Progressing  Goal: Recognize dysfunctional thoughts, communicate reality-based thoughts at the time of discharge  Description  Interventions:  - Provide medication and psycho-education to assist patient in compliance and developing insight into his/her illness   Outcome: Progressing     Problem: Ineffective Coping  Goal: Participates in unit activities  Description  Interventions:  - Provide therapeutic environment   - Provide required programming   - Redirect inappropriate behaviors   Outcome: Progressing  Goal: Understands least restrictive measures  Description  Interventions:  - Utilize least restrictive behavior  Outcome: Progressing     Problem: Risk for Self Injury/Neglect  Goal: Treatment Goal: Remain safe during length of stay, learn and adopt new coping skills, and be free of self-injurious ideation, impulses and acts at the time of discharge  Outcome: Progressing  Goal: Verbalize thoughts and feelings  Description  Interventions:  - Assess and re-assess patient's lethality and potential for self-injury  - Engage patient in 1:1 interactions, daily, for a minimum of 15 minutes  - Encourage patient to express feelings, fears, frustrations, hopes  - Establish rapport/trust with patient   Outcome: Progressing  Goal: Refrain from harming self  Description  Interventions:  - Monitor patient closely, per order  - Develop a trusting relationship  - Supervise medication ingestion, monitor effects and side effects   Outcome: Progressing  Goal: Attend and participate in unit activities, including therapeutic, recreational, and educational groups  Description  Interventions:  - Provide therapeutic and educational activities daily, encourage attendance and participation, and document same in the medical record  - Obtain collateral information, encourage visitation and family involvement in care   Outcome: Progressing     Problem: Depression  Goal: Treatment Goal: Demonstrate behavioral control of depressive symptoms, verbalize feelings of improved mood/affect, and adopt new coping skills prior to discharge  Outcome: Progressing  Goal: Verbalize thoughts and feelings  Description  Interventions:  - Assess and re-assess patient's level of risk   - Engage patient in 1:1 interactions, daily, for a minimum of 15 minutes   - Encourage patient to express feelings, fears, frustrations, hopes   Outcome: Progressing  Goal: Refrain from isolation  Description  Interventions:  - Develop a trusting relationship   - Encourage socialization   Outcome: Progressing     Problem: Anxiety  Goal: Anxiety is at manageable level  Description  Interventions:  - Assess and monitor patient's anxiety level  - Monitor for signs and symptoms of anxiety both physical and emotional (heart palpitations, chest pain, shortness of breath, headaches, nausea, feeling jumpy, restlessness, irritable, apprehensive)  - Collaborate with interdisciplinary team and initiate plan and interventions as ordered    - [place] patient to unit/surroundings  - Explain treatment plan  - Encourage participation in care  - Encourage verbalization of concerns/fears  - Identify coping mechanisms  - Assist in developing anxiety-reducing skills  - Administer/offer alternative therapies  - Limit or eliminate stimulants  Outcome: Progressing     Problem: Alteration in Orientation  Goal: Interact with staff daily  Description  Interventions:  - Assess and re-assess patient's level of orientation  - Engage patient in 1 on 1 interactions, daily, for a minimum of 15 minutes   - Establish rapport/trust with patient   Outcome: Progressing     Problem: PAIN - ADULT  Goal: Verbalizes/displays adequate comfort level or baseline comfort level  Description  Interventions:  - Encourage patient to monitor pain and request assistance  - Assess pain using appropriate pain scale  - Administer analgesics based on type and severity of pain and evaluate response  - Implement non-pharmacological measures as appropriate and evaluate response  - Consider cultural and social influences on pain and pain management  - Notify physician/advanced practitioner if interventions unsuccessful or patient reports new pain  Outcome: Progressing     Problem: SAFETY ADULT  Goal: Patient will remain free of falls  Description  INTERVENTIONS:  - Assess patient frequently for physical needs  -  Identify cognitive and physical deficits and behaviors that affect risk of falls  -  [place] fall precautions as indicated by assessment   - Educate patient/family on patient safety including physical limitations  - Instruct patient to call for assistance with activity based on assessment  - Modify environment to reduce risk of injury  - Consider OT/PT consult to assist with strengthening/mobility  Outcome: Progressing     Problem: Nutrition/Hydration-ADULT  Goal: Nutrient/Hydration intake appropriate for improving, restoring or maintaining nutritional needs  Description  Monitor and assess patient's nutrition/hydration status for malnutrition  Collaborate with interdisciplinary team and initiate plan and interventions as ordered  Monitor patient's weight and dietary intake as ordered or per policy  Utilize nutrition screening tool and intervene as necessary  Determine patient's food preferences and provide high-protein, high-caloric foods as appropriate       INTERVENTIONS:  - Monitor oral intake, urinary output, labs, and treatment plans  - Assess nutrition and hydration status and recommend course of action  - Evaluate amount of meals eaten  - Assist patient with eating if necessary   - Allow adequate time for meals  - Recommend/ encourage appropriate diets, oral nutritional supplements, and vitamin/mineral supplements  - Order, calculate, and assess calorie counts as needed  - Recommend, monitor, and adjust tube feedings and TPN/PPN based on assessed needs  - Assess need for intravenous fluids  - Provide specific nutrition/hydration education as appropriate  - Include patient/family/caregiver in decisions related to nutrition  Outcome: Progressing  Visible intermittently, but still mostly kept to self  Attended spirituality group with prompting  Ate 75% of breakfast and 10% of lunch  Med compliant  No other behaviors or issues noted  No somatic complaints or delusions voiced  Continue to monitor

## 2020-06-23 NOTE — PROGRESS NOTES
06/23/20 0900   Team Meeting   Meeting Type Daily Rounds   Initial Conference Date 06/23/20   Patient/Family Present   Patient Present No   Patient's Family Present No     Daily Rounds Documentation     Team Members Present:   Beryl Hanna, [address] [place], [place]  SHANIKA Sharma Dr, RN    No groups 3-11  Showered last night  CSP Meeting 6/24/2020

## 2020-06-23 NOTE — PROGRESS NOTES
06/23/20 0900   Activity/Group Checklist   Group Community meeting   Attendance Did not attend   Attendance Duration (min) 31-45   Affect/Mood IRMA
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
## 2020-06-23 NOTE — PROGRESS NOTES
[address] was willing to do her Incentive Spirometry, achieving her goal of 1250ml volume consistently  She did come out for HS snack, had HS medicine  Cheerful, a bit more energized tonight  Wearing now her QHS nasal O2 @ 1L for bed (without humidification by choice)

## 2020-06-23 NOTE — PROGRESS NOTES
06/23/20 1053   Team Meeting   Meeting Type Tx Team Meeting   Initial Conference Date 06/23/20   Next Conference Date 06/30/20   Team Members Present   Team Members Present Physician;Nurse;;; Other (Discipline and Name)   Physician Team Member Dr Sulma Tan Team Member Joseph Rosenthal, RN   Care Management Team Member Erika Mckeon 55 Work Team Member Vanda Pruitt [facility]   Other (Discipline and Name) Becki Jim and Tammy Resendiz [employer] [facility]   Patient/Family Present   Patient Present Yes   Patient's Family Present No     Patient presented for tx team this morning without a completed self assessment  Patient asked questions regarding her discharge plan and [place] placement  Patient will also follow up at [facility] tomorrow with some of these items she'd like to further discuss  Patient shared she is often not motivated to attend groups, or simply 'too tired' and so lays in bed  Patient was able to adequately recall her medications and schedule  She also continues to endorse voices "chuck or leoncio", "I hear a woman telling other people things" but denies hearing voices in group  Patient also stated her overall fears have diminished  CSP tomorrow at 10am, followed by completion of interview with Barber Beasley from [facility]

## 2020-06-23 NOTE — PROGRESS NOTES
06/23/20 1100   Activity/Group Checklist   Group   (IMR/Community Support Plan )   Attendance Did not attend   Attendance Duration (min) 46-60   Affect/Mood IRMA

## 2020-06-24 NOTE — PROGRESS NOTES
06/24/20 0915   Activity/Group Checklist   Group Community meeting   Attendance Did not attend   Attendance Duration (min) 31-45   Affect/Mood IRMA

## 2020-06-24 NOTE — PLAN OF CARE
Problem: Alteration in Thoughts and Perception  Goal: Verbalize thoughts and feelings  Description  Interventions:  - Promote a nonjudgmental and trusting relationship with the patient through active listening and therapeutic communication  - Assess patient's level of functioning, behavior and potential for risk  - Engage patient in 1 on 1 interactions for a minimum of 15 minutes each session  - Encourage patient to express fears, feelings, frustrations, and discuss symptoms    - [place] patient to reality, help patient recognize reality-based thinking   - Administer medications as ordered and assess for potential side effects  - Provide the patient education related to the signs and symptoms of the illness and desired effects of prescribed medications  Outcome: Progressing  Goal: Agree to be compliant with medication regime, as prescribed and report medication side effects  Description  Interventions:  - Offer appropriate PRN medication and supervise ingestion; conduct aims, as needed   Outcome: Progressing     Problem: Ineffective Coping  Goal: Patient/Family participate in treatment and DC plans  Description  Interventions:  - Provide therapeutic environment  Outcome: Progressing  Goal: Patient/Family verbalizes awareness of resources  Outcome: Progressing  Goal: Understands least restrictive measures  Description  Interventions:  - Utilize least restrictive behavior  Outcome: Progressing     Problem: Risk for Self Injury/Neglect  Goal: Treatment Goal: Remain safe during length of stay, learn and adopt new coping skills, and be free of self-injurious ideation, impulses and acts at the time of discharge  Outcome: Progressing  Goal: Verbalize thoughts and feelings  Description  Interventions:  - Assess and re-assess patient's lethality and potential for self-injury  - Engage patient in 1:1 interactions, daily, for a minimum of 15 minutes  - Encourage patient to express feelings, fears, frustrations, hopes  - Establish rapport/trust with patient   Outcome: Progressing  Goal: Refrain from harming self  Description  Interventions:  - Monitor patient closely, per order  - Develop a trusting relationship  - Supervise medication ingestion, monitor effects and side effects   Outcome: Progressing     Problem: Depression  Goal: Verbalize thoughts and feelings  Description  Interventions:  - Assess and re-assess patient's level of risk   - Engage patient in 1:1 interactions, daily, for a minimum of 15 minutes   - Encourage patient to express feelings, fears, frustrations, hopes   Outcome: Progressing     Problem: Anxiety  Goal: Anxiety is at manageable level  Description  Interventions:  - Assess and monitor patient's anxiety level  - Monitor for signs and symptoms of anxiety both physical and emotional (heart palpitations, chest pain, shortness of breath, headaches, nausea, feeling jumpy, restlessness, irritable, apprehensive)  - Collaborate with interdisciplinary team and initiate plan and interventions as ordered    - [place] patient to unit/surroundings  - Explain treatment plan  - Encourage participation in care  - Encourage verbalization of concerns/fears  - Identify coping mechanisms  - Assist in developing anxiety-reducing skills  - Administer/offer alternative therapies  - Limit or eliminate stimulants  Outcome: Progressing     Problem: Alteration in Orientation  Goal: Interact with staff daily  Description  Interventions:  - Assess and re-assess patient's level of orientation  - Engage patient in 1 on 1 interactions, daily, for a minimum of 15 minutes   - Establish rapport/trust with patient   Outcome: Progressing     Problem: PAIN - ADULT  Goal: Verbalizes/displays adequate comfort level or baseline comfort level  Description  Interventions:  - Encourage patient to monitor pain and request assistance  - Assess pain using appropriate pain scale  - Administer analgesics based on type and severity of pain and evaluate response  - Implement non-pharmacological measures as appropriate and evaluate response  - Consider cultural and social influences on pain and pain management  - Notify physician/advanced practitioner if interventions unsuccessful or patient reports new pain  Outcome: Progressing     Problem: SAFETY ADULT  Goal: Patient will remain free of falls  Description  INTERVENTIONS:  - Assess patient frequently for physical needs  -  Identify cognitive and physical deficits and behaviors that affect risk of falls  -  [place] fall precautions as indicated by assessment   - Educate patient/family on patient safety including physical limitations  - Instruct patient to call for assistance with activity based on assessment  - Modify environment to reduce risk of injury  - Consider OT/PT consult to assist with strengthening/mobility  Outcome: Progressing     Problem: Nutrition/Hydration-ADULT  Goal: Nutrient/Hydration intake appropriate for improving, restoring or maintaining nutritional needs  Description  Monitor and assess patient's nutrition/hydration status for malnutrition  Collaborate with interdisciplinary team and initiate plan and interventions as ordered  Monitor patient's weight and dietary intake as ordered or per policy  Utilize nutrition screening tool and intervene as necessary  Determine patient's food preferences and provide high-protein, high-caloric foods as appropriate       INTERVENTIONS:  - Monitor oral intake, urinary output, labs, and treatment plans  - Assess nutrition and hydration status and recommend course of action  - Evaluate amount of meals eaten  - Assist patient with eating if necessary   - Allow adequate time for meals  - Recommend/ encourage appropriate diets, oral nutritional supplements, and vitamin/mineral supplements  - Order, calculate, and assess calorie counts as needed  - Recommend, monitor, and adjust tube feedings and TPN/PPN based on assessed needs  - Assess need for intravenous fluids  - Provide specific nutrition/hydration education as appropriate  - Include patient/family/caregiver in decisions related to nutrition  Outcome: Progressing     Problem: Alteration in Thoughts and Perception  Goal: Treatment Goal: Gain control of psychotic behaviors/thinking, reduce/eliminate presenting symptoms and demonstrate improved reality functioning upon discharge  Outcome: Not Progressing  Goal: Attend and participate in unit activities, including therapeutic, recreational, and educational groups  Description  Interventions:  - Provide therapeutic and educational activities daily, encourage attendance and participation, and document same in the medical record     CERTIFIED PEER SPECIALIST INTERVENTIONS:    Complete peer assessment with patient to assess their needs and identify their goals to complete while in the recovery program as well as once discharged into the community  Patient will complete WRAP Plan, Crisis Plan and 5 Life Domains  Patient will attend 50% of groups offered on the unit  Patient will complete a goal card weekly      Outcome: Not Progressing  Goal: Recognize dysfunctional thoughts, communicate reality-based thoughts at the time of discharge  Description  Interventions:  - Provide medication and psycho-education to assist patient in compliance and developing insight into his/her illness   Outcome: Not Progressing  Goal: Complete daily ADLs, including personal hygiene independently, as able  Description  Interventions:  - Observe, teach, and assist patient with ADLS  - Monitor and promote a balance of rest/activity, with adequate nutrition and elimination   Outcome: Not Progressing     Problem: Ineffective Coping  Goal: Participates in unit activities  Description  Interventions:  - Provide therapeutic environment   - Provide required programming   - Redirect inappropriate behaviors   Outcome: Not Progressing     Problem: Risk for Self Injury/Neglect  Goal: Attend and participate in unit activities, including therapeutic, recreational, and educational groups  Description  Interventions:  - Provide therapeutic and educational activities daily, encourage attendance and participation, and document same in the medical record  - Obtain collateral information, encourage visitation and family involvement in care   Outcome: Not Progressing  Goal: Recognize maladaptive responses and adopt new coping mechanisms  Outcome: Not Progressing  Goal: Complete daily ADLs, including personal hygiene independently, as able  Description  Interventions:  - Observe, teach, and assist patient with ADLS  - Monitor and promote a balance of rest/activity, with adequate nutrition and elimination  Outcome: Not Progressing     Problem: Depression  Goal: Treatment Goal: Demonstrate behavioral control of depressive symptoms, verbalize feelings of improved mood/affect, and adopt new coping skills prior to discharge  Outcome: Not Progressing  Goal: Refrain from isolation  Description  Interventions:  - Develop a trusting relationship   - Encourage socialization   Outcome: Not Progressing  Goal: Refrain from self-neglect  Outcome: Not Progressing   Visible intermittently, but still mostly kept to self  Did not attend any groups but did attend her [address]  Voiced feelings of paranoia towards night shift staff, questioning if she got "the right medications" because "they looked slightly bigger"  Reassured the patient that no error was made and explained that depending on , meds don't always look the same  Ate 50% of both meals  Med compliant  No other behaviors or issues noted  Continue to monitor

## 2020-06-24 NOTE — PROGRESS NOTES
06/24/20 1100   Activity/Group Checklist   Group   (IMR/Community Meeting )   Attendance Did not attend   Attendance Duration (min) 46-60   Affect/Mood IRMA

## 2020-06-24 NOTE — PROGRESS NOTES
06/24/20 9058   Team Meeting   Meeting Type Daily Rounds   Initial Conference Date 06/24/20   Team Members Present   Team Members Present Physician;;Nurse;; Other (Discipline and Name)   Physician Team Member Dr Melissa Rice Management Team Member Erika Smith 53 Work Team Member Derrick ORTIZ   Other (Discipline and Name) SHANIKA Meza   Patient/Family Present   Patient Present No   Patient's Family Present No     CSP today at [facility] followed by completing of phone interview with [facility]

## 2020-06-24 NOTE — PROGRESS NOTES
Maggie maintained on ongoing fall and SAFE precaution  Paralee Jennifer in bed with eyes closed, breath even and unlabored with 1L of o2 via nasal cannula  ,    Continues rounding implemented   No somatic complaint overnight  No PRN needed for sleep aid   No indication of pain or discomfort  Will continue to monitor  Tye Montemayor

## 2020-06-24 NOTE — PLAN OF CARE
Problem: Alteration in Thoughts and Perception  Goal: Verbalize thoughts and feelings  Description  Interventions:  - Promote a nonjudgmental and trusting relationship with the patient through active listening and therapeutic communication  - Assess patient's level of functioning, behavior and potential for risk  - Engage patient in 1 on 1 interactions for a minimum of 15 minutes each session  - Encourage patient to express fears, feelings, frustrations, and discuss symptoms    - [place] patient to reality, help patient recognize reality-based thinking   - Administer medications as ordered and assess for potential side effects  - Provide the patient education related to the signs and symptoms of the illness and desired effects of prescribed medications  Outcome: Progressing  Goal: Agree to be compliant with medication regime, as prescribed and report medication side effects  Description  Interventions:  - Offer appropriate PRN medication and supervise ingestion; conduct aims, as needed   Outcome: Progressing     Problem: Alteration in Thoughts and Perception  Goal: Attend and participate in unit activities, including therapeutic, recreational, and educational groups  Description  Interventions:  - Provide therapeutic and educational activities daily, encourage attendance and participation, and document same in the medical record     CERTIFIED PEER SPECIALIST INTERVENTIONS:    Complete peer assessment with patient to assess their needs and identify their goals to complete while in the recovery program as well as once discharged into the community  Patient will complete WRAP Plan, Crisis Plan and 5 Life Domains  Patient will attend 50% of groups offered on the unit  Patient will complete a goal card weekly      Outcome: Not Progressing     Ate 100% of dinner +ensure for dinner, did not attend groups, visible, social with select peers, compliant with routine medications,  will continue to monitor

## 2020-06-24 NOTE — PLAN OF CARE
Problem: Alteration in Thoughts and Perception  Goal: Verbalize thoughts and feelings  Description  Interventions:  - Promote a nonjudgmental and trusting relationship with the patient through active listening and therapeutic communication  - Assess patient's level of functioning, behavior and potential for risk  - Engage patient in 1 on 1 interactions for a minimum of 15 minutes each session  - Encourage patient to express fears, feelings, frustrations, and discuss symptoms    - [place] patient to reality, help patient recognize reality-based thinking   - Administer medications as ordered and assess for potential side effects  - Provide the patient education related to the signs and symptoms of the illness and desired effects of prescribed medications  Outcome: Progressing  Goal: Agree to be compliant with medication regime, as prescribed and report medication side effects  Description  Interventions:  - Offer appropriate PRN medication and supervise ingestion; conduct aims, as needed   Outcome: Progressing     Problem: Ineffective Coping  Goal: Patient/Family participate in treatment and DC plans  Description  Interventions:  - Provide therapeutic environment  Outcome: Progressing     Problem: Depression  Goal: Refrain from isolation  Description  Interventions:  - Develop a trusting relationship   - Encourage socialization   Outcome: Progressing     Zabrina Ruvalcaba anticipates discharge Monday 6/29  She is concerned that her medicines, particularly the medical ones, be ordered appropriately, be in place prior to leaving  Planning on following up w/this during latter portion of this week  She is worried that something will be missed or error occur  "It was such a mess last time " She is pleasant, chatty w/staff, some peers she encounters in [place] or dining room  Came up for supper medicine, ate 25% of meal (1/4 potato, 1/4 carrots) & an Ensure  Resting now in bed

## 2020-06-24 NOTE — CASE MANAGEMENT
CSP meeting held this morning by phone conference; [facility] [address] and Preeti Su, [facility] [place], [address] [place] by phone and Dr Otilio Maciel, and patient  CSP reviewed and revised collaboratively  Parties agreed to plan for DC this coming Monday 6/29/20, with plan for patient to be picked up by sister and go to [place] [id number]  It was discussed that patient will need a new COVID test within 72 hours of DC faxed to [facility] prior to discharge Monday  CM also to arrange for new 02 through [facility] to be sent directly to [facility]/Family medicine to write script  [facility] also requesting for MA 51 to be completed ahead and faxed to them  CM to also arrange JIMENEZ PCP appointment, pulmonary, and follow up bloodword at [facility] lab for CBC w/ diff to which patient's in agreement  CM to also fax scripts by tomorrow to [facility]  Will make PC to patient's sister to arrange transportation time and keep all parties informed

## 2020-06-24 NOTE — DISCHARGE INSTR - APPOINTMENTS
What you need to know about coronavirus disease 2019 (COVID-19)     What is coronavirus disease 2019 (COVID-19)? Coronavirus disease 2019 (COVID-19) is a respiratory illness that can spread from person to person  The virus that causes COVID-19 is a novel coronavirus that was first identified during an investigation into an outbreak in [place], [place]  Can people in the [place]  get COVID-19? Yes  COVID-19 is spreading from person to person in parts of the [facility]  Risk of infection with COVID-19 is higher for people who are close contacts of someone known to have COVID-19, for example healthcare workers, or household members  Other people at higher risk for infection are those who live in or have recently been in an area with ongoing spread of COVID-19  Learn more about places with ongoing spread at   [employer]  html#geographic  Have there been cases of COVID-19 in [place] ?   Yes  The first case of COVID-19 in the [place] was reported on January 21, 2020  The current count of cases of COVID-19 in the [place] is available on [employer] at [facility]  How does COVID-19 spread? The virus that causes COVID-19 probably emerged from an animal source, but is now spreading from person to person  The virus is thought to spread mainly between people who are in close contact with one another (within about 6 feet) through respiratory droplets produced when an infected person coughs or sneezes  It also may be possible that a person can get COVID-19 by touching a surface or object that has the virus on it and then touching their own mouth, nose, or possibly their eyes, but this is not thought to be the main way the virus spreads  Learn what is known about the spread of newly emerged coronaviruses at [employer]       What are the symptoms of COVID-19? Patients with COVID-19 have had mild to severe respiratory illness with symptoms of   fever   cough   shortness of breath    What are severe complications from this virus? Some patients have pneumonia in both lungs, multi-organ failure and in some cases death  How can I help protect myself? People can help protect themselves from respiratory illness with everyday preventive actions  Avoid close contact with people who are sick  Avoid touching your eyes, nose, and mouth withunwashed hands  Wash your hands often with soap and water for at least 20 seconds  Use an alcohol-based hand  that    contains at least 60% alcohol if soap and water are not available  If you are sick, to keep from spreading respiratory illness to others, you should   Stay home when you are sick  Cover your cough or sneeze with a tissue, then throw the tissue in the trash  Clean and disinfect frequently touched objectsand surfaces  What should I do if I recently traveled from an area with ongoing spread of COVID-19? If you have traveled from an affected area, there may be restrictions on your movements for up to 2 weeks  If you develop symptoms during that period (fever, cough, trouble breathing), seek medical advice  Call the office of your health care provider before you go, and tell them about your travel and your symptoms  They will give you instructions on how to get care without exposing other people to your illness  While sick, avoid contact with people, don't go out and delay any travel to reduce the possibility of spreading illness to others  Is there a vaccine? There is currently no vaccine to protect against COVID-19  The best way to prevent infection is to take everyday preventive actions, like avoiding close contact with people who are sick and washing your hands often  Is there a treatment? There is no specific antiviral treatment for COVID-19  People with COVID-19 can seek medical care to helprelieve symptoms  For more information: www cdc gov/LRKMH75KG [id number] 03/03/2020     What to do if you are sick with coronavirus disease 2019 (COVID-19): If you are sick with COVID-19 or suspect you are infected with the virus that causes COVID-19, follow the steps below to help prevent the disease from spreading to people in your home and community  Stay home except to get medical care   You should restrict activities outside your home, except for getting medical care  Do not go to work, school, or public areas  Avoid using public transportation, ride-sharing, or taxis  Separate yourself from other people and animals in your home:    People: As much as possible, you should stay in a specific room and away from other people in your home  Also, you should use a separate bathroom, if available  Animals: Do not handle pets or other animals while sick  See COVID-19 and Animals for more information  Call ahead before visiting your doctor: If you have a medical appointment, call the healthcare provider and tell them that you have or may have COVID-19  This will help the healthcare provider's office take steps to keep other people from getting infected or exposed  Wear a facemask: You should wear a facemask when you are around other people (e g , sharing a room or vehicle) or pets and before you enter a healthcare provider's office  If you are not able to wear a facemask (for example, because it causes trouble breathing), then people who live with you should not stay in the same room with you, or they should wear a facemask if they enteryour room  Cover your coughs and sneezes:   Cover your mouth and nose with a tissue when you cough or sneeze   Throw used tissues in a lined trash can; immediately wash your hands with soap and water for at least 20 seconds or clean your hands with an alcohol-based hand  that contains at least 60 to 95% alcohol, covering all surfaces of your hands and rubbing them together until they feel dry  Soap and water should be used preferentially if hands are visibly dirty  Avoid sharing personal household items: You should not share dishes, drinking glasses, cups, eating utensils, towels, or bedding with other people or pets in your home  After using these items, they should be washed thoroughly with soap and water  Clean your hands often:  Wash your hands often with soap and water for at least 20 seconds  If soap and water are not available, clean your hands with an alcohol-based hand  that contains at least 60% alcohol, covering all surfaces of your hands and rubbing them together until they feel dry  Soap and water should be used preferentially if hands are visibly dirty  Avoid touching your eyes, nose, and mouth with unwashed hands  Clean all "high-touch" surfaces every day:  High touch surfaces include counters, tabletops, doorknobs, bathroom fixtures, toilets, phones, keyboards, tablets, and bedside tables  Also, clean any surfaces that may have blood, stool, or body fluids on them  Use a household cleaning spray or wipe, according to the label instructions  Labels contain instructions for safe and effective use of the cleaning product including precautions you should take when applying the product, such as wearing gloves and making sure you have good ventilation during use of the product  Monitor your symptoms:  Seek prompt medical attention if your illness is worsening (e g , difficulty breathing)  Before seeking care, call your healthcare provider and tell them that you have, or are being evaluated for, COVID-19  Put on a facemask before you enter the facility  These steps will help the healthcare provider's office to keep other people in the office or waiting room from getting infectedor exposed  Ask your healthcare provider to call the local or state health department   Persons who are placed under active monitoring or facilitated self-monitoring should follow instructions provided by their local health department or occupational health professionals, as appropriate  If you have a medical emergency and need to call 911, notify the dispatch personnel that you have, or are being evaluated for COVID-19  If possible, put on a facemask before emergency medical services arrive  Discontinuing home isolation:  Patients with confirmed COVID-19 should remain under home isolation precautions until the risk of secondary transmission to others is thought to be low  The decision to discontinue home isolation precautions should be made on a case-by-case basis, in consultation with healthcare providers and state and local health departments  For more information: www cdc gov/COVID19   CS [id number] 02/24/2020     Stay home when you are sick,except to get medical care  Wash your hands often with soap and water for at least 20 seconds  Cover your cough or sneeze with a tissue, then throw the tissue in the trash  Clean and disinfect frequently touched objects and surfaces  Avoid touching your eyes, nose, and mouth  STOP THE SPREAD OF GERMS  For more information: www cdc gov/COVID19 Avoid close contact with people who are sick  Help prevent the spread of respiratory diseases like COVID-19

## 2020-06-24 NOTE — CASE MANAGEMENT
Patient completed her [facility] phone interview following her CSP today, answering all questions and engaging appropriately  ACT will be able to officially open patient upon her discharge Monday

## 2020-06-24 NOTE — DISCHARGE INSTR - OTHER ORDERS
You are being discharged to your residence:    [facility] [address]    The following resources are available to you:    [address] Suicide Hotline: [address]: [address]: [address]: 7-787.840.4965  For Shirley Rubio 15   · National Suicide Prevention Lifeline: 2-484-544-TALK (2372)   · [address]: 0-207.440.1068   · Crisis Text Line: Text PA to 611-041 · [employer]: 5-396.321.7717 or www  kvwg7aupnw  org    · [place] Crisis Line: 5-373-486-IQCB (8568)   · Disaster Distress Helpline: 4-336.215.1555   · Get Help Now Hotline (for substance use disorders): 3-388.149.9055 [address]  Call to inquire about resources & services available to you  [address] [place] [address] [facility] [place] [address] [phone]    CHRIS FREE ONLINE SUPPORT  For General Support --- Tues :00 - 2:30 pm   For Family Members --- Wed 2:00 - 3:30 pm   For Peers --- Thurs 6:00 - 7:30 pm   Come share support with those who understand  Perkins for our [facility] [employer], held on [facility] led by 2 trained facilitators with lived experience, by emailing [email or web address] or calling [address] [facility] 2-1-1: This is a toll free, confidential, 24-hour-a-day service which connects you to a community  in your area who can help you find services and resources that are available to you locally and provide critical services that can improve and save lives  Call: 211  [employer]: https://del toro-apodaca net/    Warmline: 21 169.380.3042 is a confidential 7 days/week telephone support service manned by trained mental health consumers   Warmline operates daily but is not able to accept calls between 2AM-6AM    Gena Gentile provides support, a listening ear and can provide information about available services   Warmline specializes in the concerns of mental health consumers, their families and friends  However, we are also here for anyone who has a mental health concern, is confused about or just doesn't know anything about mental health or where to get information  To reach Seth, call [phone] accepts calls between 6:00 AM to 10:00 AM and from 4:00 PM to 12:09 AM or click on the link to view additional information  [facility] [address] [facility], [address]  [phone] Fax 0184 [address] [place] [address],[address] [phone]  [address] [place] [address] [phone]    Medicaid, SNAP, TANF, [address] [place]   Call to inquire about resources & services available to you    [address], [facility], [place] [address]  [phone]

## 2020-06-24 NOTE — PROGRESS NOTES
Psychiatry Progress Note [address] 63 y o  female MRN: [id number]  Unit/Bed#: MARCIO ADAIR [facility] 111-01 Encounter: [id number]  Code Status: Level 1 - Full Code    PCP: Jordan Chavez PA-C    Date of Admission:  7/23/2019 1730   Date of Service:  06/24/20  Patient Active Problem List   Diagnosis    COPD with asthma ([facility]    Tobacco use disorder, continuous    Compression fracture of L4 lumbar vertebra    Ventral hernia    Acute on chronic respiratory failure with hypoxia ([facility] )    [facility]ffective disorder, [facility]    Acquired hypothyroidism    Gastroesophageal reflux disease without esophagitis    Abnormal CT of the chest    Excessive cerumen in left ear canal    Lipoma of right upper extremity    Noncompliant with deep vein thrombosis (DVT) prophylaxis    At risk for aspiration    Ear ache    Tachycardia    Chest pain    Low HDL (under 40)     Diagnosis schizoaffective bipolar  Assessment    Overall Status:  Waiting for the CSP meeting later today, still with psychosomatic symptoms in some paranoia and anticipating discharge sometime next week    Certification Statement to demonstrate a period of stability by attending to ADLs and becoming less psychosomatic in attending more groups    Acceptance by patient:  Accepting  Darline Real in recovery:  Living at another personal care home   Understanding of medications:  Patient is aware about risks side effects benefits and precautions of medications   Involved in reintegration process: On hold due to [address] in relationship with psychiatrist:  Trusting sometimes    Medication changes   None today  Non-pharmacological treatments   Continue with individual, group, milieu and occupational therapy using recovery principles and psycho-education about accepting illness and the need for treatment   Encourage to take showers twice a week and cooperate with treatment and adl skills as per her behav  Plan   Therapeutic passes on hold due to covid 19 lock down   Prepare for the [facility] and encouraged to accept  rather than refuse it   Reminded to attend at least 25% of groups on a daily basis including [facility]      Safety   Safety and communication plan established to target dynamic risk factors discussed above  Discharge Plan   · back to a [facility] at [address]  Patient was somewhat anxious about the [facility] meeting today and does admit to wanting really to go back to the [facility] as they already have a bed available  She has some concerns about the safety in her room as she fears that she may fall over because her bed is next to a tall window overlooking the parking lot on the 2nd floor  She also worries if someone may help her for showers as she thinks she is not able to do it on her own without assistance  She did talk about all these concerns in the [facility] meeting today God reassurance from the staff at the [facility] that she will be taken care  She is still somewhat suspicious about motives of certain staff and examines personally her medications before conceiving them and is still refusing to have the oxygen concentrator on while using the nasal oxygen at night  She is still fearful of dying from multiple diseases like heart attack or choking on food or shortness of breath but seems to recognize these irrational fears as "psychosomatic"    Still reluctant to admit attend to ADLs other than taking showers once or twice a week and is making an effort to attend groups but group attendance is not too great but improving  Change in last 24 hours:  Did take a shower  Sleep:   Fair  Appetite:   Fair but she picks and chooses her food  Compliance with medications:  Good  Side effects:   none but claims to feel tired sometimes  Review of systems:  Continues to have psychosomatic symptoms as usual  Group attendance:  Poor but improving  Significant changes:  None    Mental Status Exam  Appearance:    Patient did attend the CSP meeting today  She appears  very well groomed today but continues to look older than her stated age, with combed hair and  wearing clean clothes   Has good eye contact   Suspicious in her interaction  Behavior:    Friendly pleasant but anxious suspicious and preoccupied  Speech:    tangential at times with tendency to become stilted   Mood:     anxious sad with tendency to be irritated at times,    Affect:    increased in intensity range mood congruent redirectable and her affect was brighter at times  Thought Process:   Circumstantial with tendency to have stilted speech   Thought Content:   Still continues to report some paranoia about motives of staff switching her medications or tampering with her inhalant or her oxygen concentrator   No current suicidal homicidal thoughts intent or plans reported  No phobias but has obsessions and compulsions about examining her pills before she takes them  Psychosomatic about dying by choking from food and from heart attack or from shortness of breath and now from catching Covid-19 which are all ongoing beliefs and now verbalizes she has "psychosomatic " sxs  She no longer thinks  her mother is coming here to harm her and is receptive to verbal reassuarance and support and redirection    Perceptual Disturbances:  Claims to hear voices but cannot decipher other than from a lady but not commanding    Risk Potential:   Ability to care for herself and refusal to take showers  Sensorium:   fully oriented to place, person, time, date, day, month, year and to situation  Cognition:    Grossly intact, aware of current events like the corona virus situation, recent and remote memory grossly intact     No language deficit  Consciousness:   Alert and awake  Attention and concentration:  fair  Intellect:    average  Insight:    improving  Judgment:    fair  Motor Activity:  No abnormal involuntary movement noted today    Vitals  Temp:  [97 3 °F (36 3 °C)-97 6 °F (36 4 °C)] 97 3 °F (36 3 °C)  HR:  [66-94] 66  Resp:  [16-18] 16  BP: ()/(55-72) 91/55  SpO2:  [90 %-93 %] 93 %  No intake or output data in the 24 hours ending 06/24/20 0719    Lab Results: No New Labs Available For Today  Results from last 7 days   Lab Units 06/19/20  0602   WBC Thousand/uL 8 00   RBC Million/uL 4 50   HEMOGLOBIN g/dL 14 4   HEMATOCRIT % 42 8   MCV fL 95   PLATELETS Thousands/uL 210   NEUTROS ABS Thousands/µL 3 90         Current Facility-Administered Medications:  acetaminophen 325 mg Oral Q6H PRN Mynor Terry MD   acetaminophen 650 mg Oral Q6H PRN Mynor Terry MD   acetaminophen 650 mg Oral Q8H PRN Mynor Terry MD   albuterol 2 puff Inhalation Q4H PRN Mynor Terry MD   aluminum-magnesium hydroxide-simethicone 15 mL Oral Q4H PRN Mynor Terry MD   ammonium lactate 1 application Topical BID PRN Mynor Terry MD   benzonatate 100 mg Oral TID PRN Mynor Terry MD   benztropine 1 mg Intramuscular Q8H PRN Mynor Terry MD   carbamide peroxide 5 drop Left Ear BID PRN Mynor Terry MD   cloZAPine 25 mg Oral BID Mynor Terry MD   cloZAPine 50 mg Oral BID Mynor Terry MD   docusate sodium 100 mg Oral BID PRN Mynor Terry MD   EPINEPHrine PF 0 15 mg Intramuscular Once PRN Mynor Terry MD   fluticasone-vilanterol 1 puff Inhalation Daily Mynor Terry MD   ketotifen 1 drop Right Eye BID PRN Mynor Terry MD   levothyroxine 125 mcg Oral Early Morning yMnor Terry MD   magnesium hydroxide 30 mL Oral Daily PRN Mynor Terry MD   montelukast 10 mg Oral HS Mynor Terry MD   OLANZapine 5 mg Intramuscular Q8H PRN Mynor Terry MD   OLANZapine 5 mg Oral Q8H PRN Mynor Terry MD   ondansetron 4 mg Oral Q6H PRN Mynor Terry MD   pantoprazole 40 mg Oral Early Morning Mynor Terry MD   polyethylene glycol 17 g Oral Daily PRN Mynor Terry MD   polyvinyl alcohol 1 drop Both Eyes Q3H PRN Mynor Terry MD   sertraline 200 mg Oral Daily Mynor Terry MD   sucralfate 1,000 mg Oral BID Yani Toribio MD   theophylline 200 mg Oral Daily Zac Sierra MD   tiotropium 18 mcg Inhalation Daily Zac Sierra MD   traZODone 25 mg Oral HS PRN Zac Sierra MD       Counseling / Coordination of Care: Total floor / unit time spent today 15 minutes  Greater than 50% of total time was spent with the patient and / or family counseling and / or somewhat receptive to supportive listening and teaching positive coping skills to deal with symptom mangement  Patient's Rights, confidentiality and exceptions to confidentiality, use of automated medical record, Jeb Titus adeline staff access to medical record, and consent to treatment reviewed

## 2020-06-25 PROBLEM — H92.09 EAR ACHE: Status: RESOLVED | Noted: 2019-01-01 | Resolved: 2020-01-01

## 2020-06-25 PROBLEM — Z91.89 AT RISK FOR ASPIRATION: Status: RESOLVED | Noted: 2019-10-09 | Resolved: 2020-01-01

## 2020-06-25 NOTE — CASE MANAGEMENT
Patient's former Pulmonologist Dr Reji Blum is no longer associated with [facility]- patient signed VERNA for [address] Pulmonary and PC placed to [facility] location to schedule appointment- no answer- VM left with request for call back  Will follow up    -  Update- above appointment scheduled, in AVS

## 2020-06-25 NOTE — PLAN OF CARE
Problem: Alteration in Thoughts and Perception  Goal: Verbalize thoughts and feelings  Description  Interventions:  - Promote a nonjudgmental and trusting relationship with the patient through active listening and therapeutic communication  - Assess patient's level of functioning, behavior and potential for risk  - Engage patient in 1 on 1 interactions for a minimum of 15 minutes each session  - Encourage patient to express fears, feelings, frustrations, and discuss symptoms    - [place] patient to reality, help patient recognize reality-based thinking   - Administer medications as ordered and assess for potential side effects  - Provide the patient education related to the signs and symptoms of the illness and desired effects of prescribed medications  Outcome: Progressing  Goal: Agree to be compliant with medication regime, as prescribed and report medication side effects  Description  Interventions:  - Offer appropriate PRN medication and supervise ingestion; conduct aims, as needed   Outcome: Progressing     Problem: Alteration in Thoughts and Perception  Goal: Attend and participate in unit activities, including therapeutic, recreational, and educational groups  Description  Interventions:  - Provide therapeutic and educational activities daily, encourage attendance and participation, and document same in the medical record     CERTIFIED PEER SPECIALIST INTERVENTIONS:    Complete peer assessment with patient to assess their needs and identify their goals to complete while in the recovery program as well as once discharged into the community  Patient will complete WRAP Plan, Crisis Plan and [employer]  Patient will attend 50% of groups offered on the unit  Patient will complete a goal card weekly      Outcome: Not Progressing     Problem: Depression  Goal: Refrain from isolation  Description  Interventions:  - Develop a trusting relationship   - Encourage socialization   Outcome: Not Progressing Yohan Daniels [facility]  followed through w/calling her health insurance to update PCP information  She is a bit anxious about the coordination of her discharge, that all in place as it should be  Did come for her supper medicine, ate 25% of meal (baked potato) & drank an Ensure  Has been resting in bed since conclusion of meal  Did not respond to invitation to optional [employer] group

## 2020-06-25 NOTE — PLAN OF CARE
Spoke with sister Bradley Sheridan by phone who confirmed she is able to  patient Monday at [facility] to bring to [facility]  Bradley Sheridan will call nurse station to notify of her arrival      Problem: DISCHARGE PLANNING  Goal: Discharge to home or other facility with appropriate resources  Description  INTERVENTIONS:  - Conduct assessment to determine patient/family and health care team treatment goals, and need for post-acute services based on payer coverage, community resources, and patient preferences, and barriers to discharge  - Address psychosocial, clinical, and financial barriers to discharge as identified in assessment in conjunction with the patient/family and health care team  - Assist the patient in reintegration back into the community by removing barriers which may hinder a successful discharge once deemed stable  - Arrange appropriate level of post-acute services according to patient's needs and preference and payer coverage in collaboration with the physician and health care team  - Communicate with and update the patient/family, physician, and health care team regarding progress on the discharge plan  - Arrange appropriate transportation to post-acute venues    Outcome: Progressing     Problem: Anxiety  Goal: Anxiety is at manageable level  Description  Interventions:  - Assess and monitor patient's anxiety level  - Monitor for signs and symptoms of anxiety both physical and emotional (heart palpitations, chest pain, shortness of breath, headaches, nausea, feeling jumpy, restlessness, irritable, apprehensive)  - Collaborate with interdisciplinary team and initiate plan and interventions as ordered    - [place] patient to unit/surroundings  - Explain treatment plan  - Encourage participation in care  - Encourage verbalization of concerns/fears  - Identify coping mechanisms  - Assist in developing anxiety-reducing skills  - Administer/offer alternative therapies  - Limit or eliminate stimulants  Outcome: Progressing Problem: Depression  Goal: Verbalize thoughts and feelings  Description  Interventions:  - Assess and re-assess patient's level of risk   - Engage patient in 1:1 interactions, daily, for a minimum of 15 minutes   - Encourage patient to express feelings, fears, frustrations, hopes   Outcome: Progressing     Problem: Risk for Self Injury/Neglect  Goal: Recognize maladaptive responses and adopt new coping mechanisms  Outcome: Progressing     Problem: Risk for Self Injury/Neglect  Goal: Recognize maladaptive responses and adopt new coping mechanisms  Outcome: Progressing     Problem: Ineffective Coping  Goal: Identifies healthy coping skills  Outcome: Progressing     Problem: Alteration in Thoughts and Perception  Goal: Agree to be compliant with medication regime, as prescribed and report medication side effects  Description  Interventions:  - Offer appropriate PRN medication and supervise ingestion; conduct aims, as needed   Outcome: Progressing

## 2020-06-25 NOTE — CASE MANAGEMENT
CM informed by [facility] that patient needs to call her insurance to switch PCP prior to them scheduling a JIMENEZ appointment  CM attempted to speak with patient, however she was sound asleep  Note left bedside- will follow up so she can call insurance and switch providers  Will then follow up with making JIMENEZ appointment

## 2020-06-25 NOTE — PROGRESS NOTES
Psychiatry Progress Note [address] 63 y o  female MRN: [id number]  Unit/Bed#: MARCIO ADAIR [facility] 111-01 Encounter: [id number]  Code Status: Level 1 - Full Code    PCP: Bethanie Zaragoza PA-C    Date of Admission:  7/23/2019 173   Date of Service:  06/25/20  Patient Active Problem List   Diagnosis    [facility]    Tobacco use disorder, continuous    Compression fracture of L4 lumbar vertebra    Ventral hernia    Acute on chronic respiratory failure with hypoxia [facility]    Sch[facility]oaffective disorder, [facility] type ([facility]    Acquired hypothyroidism    Gastroesophageal reflux disease without esophagitis    Abnormal CT of the chest    Excessive cerumen in left ear canal    Lipoma of right upper extremity    Noncompliant with deep vein thrombosis (DVT) prophylaxis    At risk for aspiration    Ear ache    Tachycardia    Chest pain    Low HDL (under 40)     Diagnosis schizoaffective bipolar  Assessment    Overall Status:  Waiting for discharge hopefully this coming Monday to the [place] personal care home but still with some residual paranoia and psychosomatic symptoms    Certification Statement to demonstrate a period of stability by attending to ADLs and becoming less psychosomatic in attending more groups    Acceptance by patient:  Accepting  Cj Blanco in recovery:  Living at another personal care home   Understanding of medications:  Patient is aware about risks side effects benefits and precautions of medications   Involved in reintegration process: On hold due to [address] in relationship with psychiatrist:  Trusting sometimes    Medication changes   None today  Non-pharmacological treatments   Continue with individual, group, milieu and occupational therapy using recovery principles and psycho-education about accepting illness and the need for treatment     Encourage to take showers twice a week and cooperate with treatment and adl skills as per her behav  Plan   Prepare for the [facility] and encouraged to accept  rather than refuse it   Reminded to attend at least 25% of groups on a daily basis including [facility]  To order COVID-19 test and a CBC today prior to anticipated discharge on Monday    Safety   Safety and communication plan established to target dynamic risk factors discussed above  Discharge Plan   · back to a [facility] at [address]  Patient did attend CSP meeting yesterday and now has finally accepted going to the [facility] hopefully next Monday  She had raise some concerns in the [facility] meeting and she got the right answers satisfactorily from the personal care home staff  She wants her sister to take her to the personal care home on Monday night evening which will be checked with the act team and the personal care home  He she is still somewhat suspicious about certain staff at night shift and continues to personally examine her medications before consuming them and still refuses to have the oxygen concentrator in believing that somebody is tampering it with them but has been using the nasal oxygen at night  She continues to have psychosomatic symptoms as usual and is still somewhat reluctant to attending to her ADLs and is making an effort to attend some of the groups  Change in last 24 hours:  None significant and did attend CSP meeting accepting the discharge next Monday  Sleep:   Fair  Appetite:   [address] but she picks and chooses her food  Compliance with medications:  Good  Side effects:   none but claims to feel tired sometimes  Review of systems:  Continues to have psychosomatic symptoms as usual  Group attendance:  Poor but improving  Significant changes:  None    Mental Status Exam  Appearance:    Patient appears  happy and content very better groomed today but continues to look older than her stated age, with combed hair and  wearing clean clothes   Has good eye contact     Suspicious in her interaction  Behavior:    Friendly pleasant but anxious and less suspicious and preoccupied  Speech:    tangential at times with tendency to become stilted   Mood:     anxious sad with tendency to be irritated at times,    Affect:    increased in intensity range mood congruent redirectable and her affect was brighter at times  Thought Process:   Circumstantial with tendency to have stilted speech   Thought Content:   Again tends to report some paranoia about motives of staff switching her medications or tampering with her inhalant or her oxygen concentrator   No current suicidal homicidal thoughts intent or plans reported  No phobias but has obsessions and compulsions about examining her pills before she takes them  Psychosomatic about dying by choking from food and from heart attack or from shortness of breath and now from catching Covid-19 which are all ongoing beliefs and now verbalizes she has "psychosomatic " sxs  She no longer thinks  her mother is coming here to harm her and is receptive to verbal reassuarance and support and redirection    Perceptual Disturbances:  Claims to hear voices but cannot decipher other than from a lady but not commanding    Risk Potential:   Ability to care for herself and refusal to take showers  Sensorium:   fully oriented to place, person, time, date, day, month, year and to situation  Cognition:    Grossly intact, aware of current events like the corona virus situation, recent and remote memory grossly intact     No language deficit  Consciousness:   Alert and awake  Attention and concentration:  fair  Intellect:    average  Insight:    improving  Judgment:    fair  Motor Activity:  No abnormal involuntary movement noted today    Vitals  Temp:  [97 °F (36 1 °C)] 97 °F (36 1 °C)  HR:  [72] 72  Resp:  [14] 14  BP: (92)/(58) 92/58  SpO2:  [93 %] 93 %  No intake or output data in the 24 hours ending 06/25/20 8339    Lab Results: No New Labs Available For Today  Results from last 7 days   Lab Units 06/19/20  0602   WBC Thousand/uL 8 00   RBC Million/uL 4 50   HEMOGLOBIN g/dL 14 4   HEMATOCRIT % 42 8   MCV fL 95   PLATELETS Thousands/uL 210   NEUTROS ABS Thousands/µL 3 90         Current Facility-Administered Medications:  acetaminophen 325 mg Oral Q6H PRN Jerome Lopez MD   acetaminophen 650 mg Oral Q6H PRN Jerome Lopez MD   acetaminophen 650 mg Oral Q8H PRN Jerome Lopez MD   albuterol 2 puff Inhalation Q4H PRN Jerome Lopez MD   aluminum-magnesium hydroxide-simethicone 15 mL Oral Q4H PRN Jerome Lopez MD   ammonium lactate 1 application Topical BID PRN Jerome Lopez MD   benzonatate 100 mg Oral TID PRN Jerome Lopez MD   benztropine 1 mg Intramuscular Q8H PRN Jerome Lopez MD   carbamide peroxide 5 drop Left Ear BID PRN Jerome Lopez MD   cloZAPine 25 mg Oral BID Jerome Lopez MD   cloZAPine 50 mg Oral BID Jerome Lopez MD   docusate sodium 100 mg Oral BID PRN Jerome Lopez MD   EPINEPHrine PF 0 15 mg Intramuscular Once PRN Jerome Lopez MD   fluticasone-vilanterol 1 puff Inhalation Daily Jerome Lopez MD   ketotifen 1 drop Right Eye BID PRN Jerome Lopez MD   levothyroxine 125 mcg Oral Early Morning Jerome Lopez MD   magnesium hydroxide 30 mL Oral Daily PRN Jerome Lopez MD   montelukast 10 mg Oral HS Jerome Lopez MD   OLANZapine 5 mg Intramuscular Q8H PRN Jerome Lopez MD   OLANZapine 5 mg Oral Q8H PRN Jerome Lopez MD   ondansetron 4 mg Oral Q6H PRN Jerome Lopez MD   pantoprazole 40 mg Oral Early Morning Jerome Lopez MD   polyethylene glycol 17 g Oral Daily PRN Jerome Lopez MD   polyvinyl alcohol 1 drop Both Eyes Q3H PRN Jerome Lopez MD   sertraline 200 mg Oral Daily Jerome Lopez MD   sucralfate 1,000 mg Oral BID Trisha Hauser MD   theophylline 200 mg Oral Daily Jerome Lopez MD   tiotropium 18 mcg Inhalation Daily Jerome Lopez MD   traZODone 25 mg Oral HS PRN Jerome Lopez MD       Counseling / Coordination of Care: Total floor / unit time spent today 15 minutes   Greater than 50% of total time was spent with the patient and / or family counseling and / or somewhat receptive to supportive listening and teaching positive coping skills to deal with symptom mangement  Patient's Rights, confidentiality and exceptions to confidentiality, use of automated medical record, Jeb Patrick staff access to medical record, and consent to treatment reviewed

## 2020-06-25 NOTE — PROGRESS NOTES
2[address] Maggie did not attend PM Group  She declined using Incentive Spirometer tonight  "My rib cage hurts  I don't want to crack my ribs " Did come out for HS medicine (except the Singulair), had an HS snack  Wearing now her QHS nasal O2 @ 1L for bed without humidification as fears 11-7 tampers w/bottle

## 2020-06-25 NOTE — CONSULTS
Inpatient Consultation - Shanti Wang    Patient Information: Violetta Villasenor 63 y o  female MRN: [id number]  Unit/Bed#: [facility] 111-01 Encounter: [id number]  PCP: Trish Alfaro PA-C  Date of Admission:  7/23/2019  Date of Consultation: 06/25/20  Requesting Physician: Manuel Copeland MD    Reason For Consultation:      Assessment/Plan:    * Schizoaffective disorder, bipolar type [facility])  Assessment & Plan  Management per Psychiatry    COPD with asthma [facility])  Assessment & Plan  Not in acute exacerbation    -Continue with daily inhalers: Breo Ellipta 200-25 mcg QD and Spiriva 18 mcg    -Proventil HFA PRN  -Continue Theophylline 200 mg QD, and Montelukast 10 mg daily   -For cough control take as needed Tessalon perles 100 mg TID  -O2 supply, 1L at bedtime electronically ordered  Mostly for comfort measures  Maintain SpO2 between 88-92%      Acquired hypothyroidism  Assessment & Plan  Controlled  Last TSH 2/29/2020: 2 23    -Continue with Levothyroxine 125 mcg daily    Gastroesophageal reflux disease without esophagitis  Assessment & Plan  Controlled    -Continue with Protonix, 40 mg daily  -Continue Mylanta and Sucralfate   -Zofran 4 mg Q 6 hrs PRN for nausea   -Adhere to a non-acidic diet     Low HDL (under 40)  Assessment & Plan  Lipid panel (10/25/2019) with evidence of reduced LDL at 38    -Encourage daily exercise   -Healthy lifestyle and diet     Ear acheresolved as of 6/25/2020  Assessment & Plan  Stable, resolved   Left ear ache symptoms previously, using debrox drops which provided relief  VTE Prophylaxis: Reason for no pharmacologic prophylaxis Ambulating/Low risk  / reason for no mechanical VTE prophylaxis ambulatory/low risk     Recommendations for Discharge:  · F/u for TCM visit with Rogelio Cardoza     Counseling / Coordination of Care Time: 20 minutes  Greater than 50% of total time spent on patient counseling and coordination of care      Collaboration of Care: Were Recommendations Directly Discussed with Primary Treatment Team? - Yes     History of Present Illness:    Efraín Ruth is a 63 y o  female with past medial history significant for schizoaffective disorder (bipolar type), COPD with asthma, hypothyroidism, and GERD, who is originally admitted to the psychiatry inpatient service on 7/23/2019 due to    We are consulted for updating her history and order script for O2 at bedtime prior to discharge from [facility] on 06/29/2020  Patient will be going to a group home  Currently, patient states she is overall doing well  She is requesting that she gets a hospital bed at her group home  We discussed that she doesn't have medical necessities requiring a hospital bed  She insisted we try to see if her insurance will cover  She also wants to make sure all medications will transfer over and that she will have her inhalers available  [place] endorses concern for her long toenails and would like podiatry, Dr Abida Wills consulted asap  We discussed that she doesn't have diabetes and this matter is non-urgent  No other concerns at this time  Review of Systems:    Review of Systems   Constitutional: Negative for chills and fever  HENT: Negative for trouble swallowing  Eyes: Negative for visual disturbance  Respiratory: Negative for cough and shortness of breath  Cardiovascular: Negative for chest pain, palpitations and leg swelling  Gastrointestinal: Negative for abdominal pain, constipation, diarrhea, nausea and vomiting  Neurological: Negative for dizziness, weakness and headaches  Psychiatric/Behavioral: Negative for agitation         Past Medical and Surgical History:   Past Medical History:   Diagnosis Date    Acid reflux     Allergic conjunctivitis of right eye 7/8/2019    Anxiety     Asthma     Bipolar 1 disorder (HCC)     Chronic pain disorder     Chronic respiratory failure (HCC)     Compression fracture of fourth lumbar vertebra (HCC)     COPD (chronic obstructive pulmonary disease) (HCC)     Depression     Elevated MCV 2/26/2016    GERD (gastroesophageal reflux disease)     History of home oxygen therapy     Hypomagnesemia 2/23/2016    Hypophosphatemia 2/24/2016    Hypothyroidism     Lactic acidosis 2/23/2016    Lipoma of upper extremity     Localized swelling of both lower legs 4/22/2019    Parapneumonic effusion 2/24/2016    Polydipsia 3/29/2019    Psychiatric illness     Schizoaffective disorder ([facility] )     Sepsis ([facility] ) 2/22/2016    Substance abuse ([facility] )     Nicotine    [facility]     Thoracic compression fracture ([facility] ) 2/24/2016    Ventral hernia      History reviewed  No pertinent surgical history  Meds/Allergies: Allergies: Allergies   Allergen Reactions    Peanut-Containing Drug Products Anaphylaxis    Shellfish-Derived Products Anaphylaxis    Antihistamines, Chlorpheniramine-Type     Aspirin     Atrovent [Ipratropium]     Elavil [Amitriptyline]     Iodine      Other reaction(s): Unknown Reaction    Levaquin [Levofloxacin]     Novocain [Procaine]     Penicillins      Able to tolerate azithromycin and vancomycin    Prolixin [Fluphenazine]     Sulfa Antibiotics Other (See Comments)     Unknown Zithromax-Tight Throat    Zithromax [Azithromycin] Throat Swelling     Tight throat    Adhesive [Medical Tape] Rash     Skin irritation from adhesive on EKG leads when worn for an extended period  Prior to Admission Medications   Prescriptions Last Dose Informant Patient Reported? Taking?    cloZAPine (CLOZARIL) 25 mg tablet Not Taking at Unknown time  No No   Sig: Take 3 tablets (75 mg total) by mouth daily at bedtime   Patient not taking: Reported on 8/30/2019      Facility-Administered Medications: None     Social History:     Social History     Socioeconomic History    Marital status: Single     Spouse name: Not on file    Number of children: Not on file    Years of education: Not on file  Highest education level: Not on file   Occupational History    Not on file   Social Needs    Financial resource strain: Not on file    Food insecurity:     Worry: Not on file     Inability: Not on file    Transportation needs:     Medical: Not on file     Non-medical: Not on file   Tobacco Use    Smoking status: Current Every Day Smoker     Packs/day: 0 20     Types: Cigarettes    Smokeless tobacco: Never Used   Substance and Sexual Activity    Alcohol use: Never     Frequency: Never     Binge frequency: Never    Drug use: No    Sexual activity: Not Currently   Lifestyle    Physical activity:     Days per week: Not on file     Minutes per session: Not on file    Stress: Not on file   Relationships    Social connections:     Talks on phone: Not on file     Gets together: Not on file     Attends [employer] service: Not on file     Active member of club or organization: Not on file     Attends meetings of clubs or organizations: Not on file     Relationship status: Not on file    Intimate partner violence:     Fear of current or ex partner: Not on file     Emotionally abused: Not on file     Physically abused: Not on file     Forced sexual activity: Not on file   Other Topics Concern    Not on file   Social History Narrative    Not on file       Family History:  Family History   Problem Relation Age of Onset    Arthritis Mother        Physical Exam:     Vitals:   Blood Pressure: 116/58 (06/25/20 0702)  Pulse: 77 (06/25/20 0702)  Temperature: (!) 96 1 °F (35 6 °C) (06/25/20 0700)  Temp Source: Temporal (06/25/20 0700)  Respirations: 16 (06/25/20 0700)  Height: 5' 4" (162 6 cm) (04/26/20 0710)  Weight - Scale: 64 8 kg (142 lb 12 8 oz) (06/21/20 0700)  SpO2: 93 % (06/24/20 2000)    Physical Exam   Constitutional: She is oriented to person, place, and time  She appears well-developed and well-nourished  No distress  HENT:   Head: Normocephalic and atraumatic     Nose: Nose normal    Mouth/Throat: Oropharynx is clear and moist    Eyes: Conjunctivae and EOM are normal    Neck: Normal range of motion  Neck supple  Cardiovascular: Normal rate, regular rhythm, normal heart sounds and intact distal pulses  Pulmonary/Chest: Effort normal  No respiratory distress  Decreased BS throughout all lung fields   Abdominal: Soft  Bowel sounds are normal  She exhibits no distension  There is no tenderness  Musculoskeletal: Normal range of motion  She exhibits no edema or tenderness  Neurological: She is alert and oriented to person, place, and time  Skin: Skin is warm  No rash noted  She is not diaphoretic  Psychiatric: She has a normal mood and affect  Her behavior is normal  Judgment and thought content normal    Nursing note and vitals reviewed  Additional Data:     Lab Results: I have personally reviewed pertinent reports  Results from last 7 days   Lab Units 06/25/20  1010   WBC Thousand/uL 7 70   HEMOGLOBIN g/dL 14 7   HEMATOCRIT % 43 5   PLATELETS Thousands/uL 213   NEUTROS PCT % 59   LYMPHS PCT % 31   MONOS PCT % 8   EOS PCT % 2           Invalid input(s): LABALBU        Imaging: I have personally reviewed pertinent reports  No results found  ** Please Note: This note has been constructed using a voice recognition system   Shreya Espinal MD  06/25/20  3:47 PM

## 2020-06-25 NOTE — PROGRESS NOTES
06/25/20 0900   Activity/Group Checklist   Group Community meeting   Attendance Did not attend   Attendance Duration (min) 31-45   Affect/Mood IRMA

## 2020-06-25 NOTE — PROGRESS NOTES
06/25/20 1100   Activity/Group Checklist   Group   (IMR/Goals and Discharge )   Attendance Did not attend   Attendance Duration (min) Greater than 60   Affect/Mood IRMA

## 2020-06-25 NOTE — CASE MANAGEMENT
Scripts faxed to [facility] and [facility] ACT team, per their request  Called  ACT to update on DC of 2PM  PC to [facility] to update 2PM  time as well  PC to [facility] to get JEREL RUEDA appointment- awaiting nurse call back to schedule

## 2020-06-25 NOTE — ASSESSMENT & PLAN NOTE
Lipid panel (10/25/2019) with evidence of reduced LDL at 38    -Encourage daily exercise   -Healthy lifestyle and diet

## 2020-06-25 NOTE — PLAN OF CARE
Problem: Alteration in Thoughts and Perception  Goal: Treatment Goal: Gain control of psychotic behaviors/thinking, reduce/eliminate presenting symptoms and demonstrate improved reality functioning upon discharge  Outcome: Progressing  Goal: Verbalize thoughts and feelings  Description  Interventions:  - Promote a nonjudgmental and trusting relationship with the patient through active listening and therapeutic communication  - Assess patient's level of functioning, behavior and potential for risk  - Engage patient in 1 on 1 interactions for a minimum of 15 minutes each session  - Encourage patient to express fears, feelings, frustrations, and discuss symptoms    - [place] patient to reality, help patient recognize reality-based thinking   - Administer medications as ordered and assess for potential side effects  - Provide the patient education related to the signs and symptoms of the illness and desired effects of prescribed medications  Outcome: Progressing  Goal: Agree to be compliant with medication regime, as prescribed and report medication side effects  Description  Interventions:  - Offer appropriate PRN medication and supervise ingestion; conduct aims, as needed   Outcome: Progressing     Problem: Ineffective Coping  Goal: Participates in unit activities  Description  Interventions:  - Provide therapeutic environment   - Provide required programming   - Redirect inappropriate behaviors   Outcome: Progressing  Goal: Patient/Family participate in treatment and DC plans  Description  Interventions:  - Provide therapeutic environment  Outcome: Progressing  Goal: Patient/Family verbalizes awareness of resources  Outcome: Progressing  Goal: Understands least restrictive measures  Description  Interventions:  - Utilize least restrictive behavior  Outcome: Progressing     Problem: Risk for Self Injury/Neglect  Goal: Treatment Goal: Remain safe during length of stay, learn and adopt new coping skills, and be free of self-injurious ideation, impulses and acts at the time of discharge  Outcome: Progressing  Goal: Verbalize thoughts and feelings  Description  Interventions:  - Assess and re-assess patient's lethality and potential for self-injury  - Engage patient in 1:1 interactions, daily, for a minimum of 15 minutes  - Encourage patient to express feelings, fears, frustrations, hopes  - Establish rapport/trust with patient   Outcome: Progressing  Goal: Refrain from harming self  Description  Interventions:  - Monitor patient closely, per order  - Develop a trusting relationship  - Supervise medication ingestion, monitor effects and side effects   Outcome: Progressing     Problem: Depression  Goal: Verbalize thoughts and feelings  Description  Interventions:  - Assess and re-assess patient's level of risk   - Engage patient in 1:1 interactions, daily, for a minimum of 15 minutes   - Encourage patient to express feelings, fears, frustrations, hopes   Outcome: Progressing     Problem: Anxiety  Goal: Anxiety is at manageable level  Description  Interventions:  - Assess and monitor patient's anxiety level  - Monitor for signs and symptoms of anxiety both physical and emotional (heart palpitations, chest pain, shortness of breath, headaches, nausea, feeling jumpy, restlessness, irritable, apprehensive)  - Collaborate with interdisciplinary team and initiate plan and interventions as ordered    - [place] patient to unit/surroundings  - Explain treatment plan  - Encourage participation in care  - Encourage verbalization of concerns/fears  - Identify coping mechanisms  - Assist in developing anxiety-reducing skills  - Administer/offer alternative therapies  - Limit or eliminate stimulants  Outcome: Progressing     Problem: Alteration in Orientation  Goal: Interact with staff daily  Description  Interventions:  - Assess and re-assess patient's level of orientation  - Engage patient in 1 on 1 interactions, daily, for a minimum of 15 minutes   - Establish rapport/trust with patient   Outcome: Progressing  Goal: Cooperate with recommended testing/procedures  Description  Interventions:  - Determine need for ancillary testing  - Observe for mental status changes  - Implement falls/precaution protocol   Outcome: Progressing     Problem: SAFETY ADULT  Goal: Patient will remain free of falls  Description  INTERVENTIONS:  - Assess patient frequently for physical needs  -  Identify cognitive and physical deficits and behaviors that affect risk of falls  -  [place] fall precautions as indicated by assessment   - Educate patient/family on patient safety including physical limitations  - Instruct patient to call for assistance with activity based on assessment  - Modify environment to reduce risk of injury  - Consider OT/PT consult to assist with strengthening/mobility  Outcome: Progressing   Visible intermittently, but still mostly kept to self  Did not attend any groups but was busy with various aspects of discharge planning  [facility] saw and spoke with her in regards to her upcoming discharge  She was compliant with allowing staff to obtain a blood sample for ordered labs  COVID test still needs to be collected, ED staff aware  Ate 100% of breakfast and 25% of lunch  Med compliant  No other behaviors or issues noted  Continue to monitor

## 2020-06-25 NOTE — CASE MANAGEMENT
PC to [facility] DME to find out if they can accept electronic order for O2  Awaiting call back on instructions and will contact medical to follow up  CM will also locate provider who can fill patient's new Ensure script

## 2020-06-25 NOTE — PROGRESS NOTES
Maggie maintained on ongoing fall and SAFE precaution  Liu Holiday in bed with eyes closed, breath even and unlabored with 1L of o2 via nasal cannula  ,    Continues rounding implemented   No somatic complaint overnight  No PRN needed for sleep aid   No indication of pain or discomfort  Will continue to monitor  Lalo West

## 2020-06-25 NOTE — PROGRESS NOTES
Medical [facility]) called and made aware of need for updated H&P and need for script for bedtime oxygen for discharge scheduled for 6/29/2020

## 2020-06-25 NOTE — PROGRESS NOTES
[phone] This nurse contacted Benjamin/ACT Team nurse ([phone]) to give him the requested opportunity to speak w/Maggie to introduce himself & explain his services to her  She was receptive to taking the call, though, a bit suspicious about him & what he had been told by staff

## 2020-06-26 NOTE — PROGRESS NOTES
06/26/20 1100   Activity/Group Checklist   Group Other (Comment)  (IMR: [employer])   Attendance Did not attend   Affect/Mood IRMA     Encouraged to attend group but stayed in her room

## 2020-06-26 NOTE — PROGRESS NOTES
2200 Covid test performed on Maggie by Nomacorc RN from [facility]  Specimen sent to laboratory

## 2020-06-26 NOTE — PROGRESS NOTES
06/26/20 0851   Team Meeting   Meeting Type Daily Rounds   Initial Conference Date 06/26/20   Patient/Family Present   Patient Present No   Patient's Family Present No     Daily Rounds Documentation     Team Members Present:   MD Aba Cortes, RN  Maria Luz Horn, SHANIKA Roa, [address] [place] [address]    No groups 3-11  Paranoid about staff talking to ACT RN  Discharge Monday

## 2020-06-26 NOTE — PLAN OF CARE
Problem: Alteration in Thoughts and Perception  Goal: Agree to be compliant with medication regime, as prescribed and report medication side effects  Description  Interventions:  - Offer appropriate PRN medication and supervise ingestion; conduct aims, as needed   Outcome: Progressing  Individual has been pleasant and cooperative on approach  She reports feeling anxious about discharge, reassurance offered  She continues to decline participating in groups  Compliant with medications  Appetite good for breakfast but only ate 5% of lunch  Able to express needs  Availability of staff made known  Will continue to monitor

## 2020-06-26 NOTE — PLAN OF CARE
Problem: Alteration in Thoughts and Perception  Goal: Agree to be compliant with medication regime, as prescribed and report medication side effects  Description  Interventions:  - Offer appropriate PRN medication and supervise ingestion; conduct aims, as needed   Outcome: Progressing     Problem: Nutrition/Hydration-ADULT  Goal: Nutrient/Hydration intake appropriate for improving, restoring or maintaining nutritional needs  Description  Monitor and assess patient's nutrition/hydration status for malnutrition  Collaborate with interdisciplinary team and initiate plan and interventions as ordered  Monitor patient's weight and dietary intake as ordered or per policy  Utilize nutrition screening tool and intervene as necessary  Determine patient's food preferences and provide high-protein, high-caloric foods as appropriate       INTERVENTIONS:  - Monitor oral intake, urinary output, labs, and treatment plans  - Assess nutrition and hydration status and recommend course of action  - Evaluate amount of meals eaten  - Assist patient with eating if necessary   - Allow adequate time for meals  - Recommend/ encourage appropriate diets, oral nutritional supplements, and vitamin/mineral supplements  - Order, calculate, and assess calorie counts as needed  - Recommend, monitor, and adjust tube feedings and TPN/PPN based on assessed needs  - Assess need for intravenous fluids  - Provide specific nutrition/hydration education as appropriate  - Include patient/family/caregiver in decisions related to nutrition  Outcome: Progressing     Problem: Alteration in Thoughts and Perception  Goal: Attend and participate in unit activities, including therapeutic, recreational, and educational groups  Description  Interventions:  - Provide therapeutic and educational activities daily, encourage attendance and participation, and document same in the medical record     [address] :    Complete peer assessment with patient to assess their needs and identify their goals to complete while in the recovery program as well as once discharged into the community  Patient will complete WRAP Plan, Crisis Plan and [employer]  Patient will attend 50% of groups offered on the unit  Patient will complete a goal card weekly  Outcome: Not Progressing  Goal: Complete daily ADLs, including personal hygiene independently, as able  Description  Interventions:  - Observe, teach, and assist patient with ADLS  - Monitor and promote a balance of rest/activity, with adequate nutrition and elimination   Outcome: Jannette Rojas Shikha has been visible for short intervals sitting in phone chair when phone not in use  She is pleasant, but, a bit anxious about her discharge  Particularly concerned about the overnight pulse oximetry ordered for tomorrow & which procedure was explained to her by this nurse as well as the insurance necessity piece by the   She is willing, but, worries if O2 sats are too good to qualify her for O2 @ home, she risks a potential crisis situation in the future  "My needs vary " Encouraged her to take one step @ a time  She did come up for her supper medicine, ate 100% meal (but only ordered a baked potato) plus an Ensure  Has been making/receiving phone calls or resting in room  Did not respond to invitations to offered evening programming  No attention to hygiene, though, did negotiate w/MHT a plan & timeframe to do laundry prior to discharge Monday

## 2020-06-26 NOTE — PROGRESS NOTES
06/26/20 0900   Activity/Group Checklist   Group Community meeting   Attendance Refused   Affect/Mood IRMA     Patient was on the phone for most of group; she was encouraged to attend after she was off the phone but declined to

## 2020-06-26 NOTE — CASE MANAGEMENT
CM spoke with Eduin Moreno from [facility] ([phone]) who stated that an O2 saturation test overnight is needed in order for them to bill insurance for her 1L oxygen  Discussed further with charge nurse and unit manager, who will ensure this is completed  CM to fax results back to [facility] Eduin Moreno at [phone]  Eduin Moreno stated they can deliver same day and are aware O2 needs to be delivered directly to [facility]  CM to follow up

## 2020-06-26 NOTE — PROGRESS NOTES
Psychiatry Progress Note [address] 63 y o  female MRN: [id number]  Unit/Bed#: [facility] 059-13 Encounter: [id number]  Code Status: Level 1 - Full Code    PCP: Kera Johnson PA-C    Date of Admission:  7/23/2019 1738   Date of Service:  06/26/20  Patient Active Problem List   Diagnosis    COPD with asthma ([facility] )    Tobacco use disorder, continuous    Compression fracture of L4 lumbar vertebra    Ventral hernia    Acute on chronic [facility] failure with hypoxia [facility]    Schizoaffective disorder, bipolar type ([facility] )    Acquired hypothyroidism    Gastroesophageal reflux disease without esophagitis    Abnormal CT of the chest    Excessive cerumen in left ear canal    Lipoma of right upper extremity    Noncompliant with deep vein thrombosis (DVT) prophylaxis    Low HDL (under 40)     Diagnosis schizoaffective bipolar  Assessment    Overall Status:  Anticipating discharge next Monday to the [place] personal [facility] [place] and seems to be excited that her sister is going to transport her on that day but still appears to have some underlying paranoia and still has psychosomatic symptoms   Certification Statement to demonstrate a period of stability by attending to ADLs and becoming less psychosomatic in attending more groups    Acceptance by patient:  Accepting  Naa Yo in recovery:  Living at another personal care home   Understanding of medications:  Patient is aware about risks side effects benefits and precautions of medications   Involved in reintegration process: On hold due to [address] in relationship with psychiatrist:  Trusting sometimes    Medication changes   None today  Non-pharmacological treatments   Continue with individual, group, milieu and occupational therapy using recovery principles and psycho-education about accepting illness and the need for treatment     Encourage to take showers twice a week and cooperate with treatment and adl skills as per her behav  Plan   Prepare for the [facility] and encouraged to accept  rather than refuse it   Reminded to attend at least 25% of groups on a daily basis including IMR  To order COVID-19 test and a CBC today prior to anticipated discharge on Monday    Safety   Safety and communication plan established to target dynamic risk factors discussed above  Discharge Plan   · back to a [facility]    Interval Progress  No new changes reported  She was suspicious about the nurse from the act team who talk to her yesterday  She has been refusing the Singulair ordered for over the last 2 or 3 months now she is still suspicious about the motives of stabs and does not use the oxygen concentrator at night and only uses the plane oxygen because she thinks people are tampering with the oxygen concentrator at night  She continues to personally examine her medications before she consumes them  She continues to present with psychosomatic symptoms as usual and reluctant to attend to ADLs  He is trying to attend some of the groups and understands that she needs to take a shower more often  Looking forward to getting out next Monday  Change in last 24 hours:  None significant and did attend CSP meeting accepting the discharge next Monday  Sleep:   Fair  Appetite:   [address] but she picks and chooses her food  Compliance with medications:  Good  Side effects:   none but claims to feel tired sometimes  Review of systems:  Continues to have psychosomatic symptoms as usual  Group attendance:  Poor but improving  Significant changes:  None    Mental Status Exam  Appearance:    Happy and content very better groomed today but continues to look older than her stated age, with combed hair and  wearing clean clothes   Has good eye contact   Suspicious in her interaction      Behavior:    Friendly pleasant but anxious and less suspicious and preoccupied  Speech:    tangential at times with tendency to become stilted   Mood: anxious sad with tendency to be irritated at times,    Affect:    increased in intensity range mood congruent redirectable and her affect was brighter at times  Thought Process:   Circumstantial with tendency to have stilted speech   Thought Content:   He reports some paranoia about motives of staff switching her medications or tampering with her inhalant or her oxygen concentrator   No current suicidal homicidal thoughts intent or plans reported  No phobias but has obsessions and compulsions about examining her pills before she takes them  Psychosomatic about dying by choking from food and from heart attack or from shortness of breath and now from catching Covid-19 which are all ongoing beliefs and now verbalizes she has "psychosomatic " sxs  She no longer thinks  her mother is coming here to harm her and is receptive to verbal reassuarance and support and redirection    Perceptual Disturbances:  Claims to hear voices but cannot decipher other than from a lady but not commanding    Risk Potential:   Ability to care for herself and refusal to take showers  Sensorium:   fully oriented to place, person, time, date, day, month, year and to situation  Cognition:    Grossly intact, aware of current events like the corona virus situation, recent and remote memory grossly intact     No language deficit  Consciousness:   Alert and awake  Attention and concentration:  fair  Intellect:    average  Insight:    improving  Judgment:    fair  Motor Activity:  No abnormal involuntary movement noted today    Vitals  Temp:  [96 9 °F (36 1 °C)-97 9 °F (36 6 °C)] 97 9 °F (36 6 °C)  HR:  [83-92] 92  Resp:  [16-18] 18  BP: (105-115)/(68-71) 105/71  SpO2:  [90 %-92 %] 92 %  No intake or output data in the 24 hours ending 06/26/20 0659    Lab Results: No New Labs Available For Today  Results from last 7 days   Lab Units 06/25/20  1010   WBC Thousand/uL 7 70   RBC Million/uL 4 64   HEMOGLOBIN g/dL 14 7   HEMATOCRIT % 43 5   MCV fL 94 PLATELETS Thousands/uL 213   NEUTROS ABS Thousands/µL 4 60         Current Facility-Administered Medications:  acetaminophen 325 mg Oral Q6H PRN Alirio Frazier MD   acetaminophen 650 mg Oral Q6H PRN Alirio Frazier MD   acetaminophen 650 mg Oral Q8H PRN Alirio Frazier MD   albuterol 2 puff Inhalation Q4H PRN Alirio Frazier MD   aluminum-magnesium hydroxide-simethicone 15 mL Oral Q4H PRN Alirio Frazier MD   ammonium lactate 1 application Topical BID PRN Alirio Frazier MD   benzonatate 100 mg Oral TID PRN Alirio Frazier MD   benztropine 1 mg Intramuscular Q8H PRN Alirio Frazier MD   carbamide peroxide 5 drop Left Ear BID PRN Alirio Frazier MD   cloZAPine 25 mg Oral BID Alirio Frazier MD   cloZAPine 50 mg Oral BID Alirio Frazier MD   docusate sodium 100 mg Oral BID PRN Alirio Frazier MD   EPINEPHrine PF 0 15 mg Intramuscular Once PRN Alirio Frazier MD   fluticasone-vilanterol 1 puff Inhalation Daily Alirio Frazier MD   ketotifen 1 drop Right Eye BID PRN Alirio Frazier MD   levothyroxine 125 mcg Oral Early Morning Alirio Frazier MD   magnesium hydroxide 30 mL Oral Daily PRN Alirio Frazier MD   montelukast 10 mg Oral HS Alirio Frazier MD   OLANZapine 5 mg Intramuscular Q8H PRN Alirio Frazier MD   OLANZapine 5 mg Oral Q8H PRN Alirio Frazier MD   ondansetron 4 mg Oral Q6H PRN Alirio Frazier MD   pantoprazole 40 mg Oral Early Morning Alirio Frazier MD   polyethylene glycol 17 g Oral Daily PRN Alirio Frazier MD   polyvinyl alcohol 1 drop Both Eyes Q3H PRN Alirio Frazier MD   sertraline 200 mg Oral Daily Alirio Frazier MD   sucralfate 1,000 mg Oral BID Ana Sanchez MD   theophylline 200 mg Oral Daily Alirio Frazier MD   tiotropium 18 mcg Inhalation Daily Alirio Frazier MD   traZODone 25 mg Oral HS PRN Alirio Frazier MD       Counseling / Coordination of Care: Total floor / unit time spent today 15 minutes   Greater than 50% of total time was spent with the patient and / or family counseling and / or somewhat receptive to supportive listening and teaching positive coping skills to deal with symptom mangement  Patient's Rights, confidentiality and exceptions to confidentiality, use of automated medical record, [facility] staff access to medical record, and consent to treatment reviewed

## 2020-06-26 NOTE — CASE MANAGEMENT
Awaiting hard copy of 02 script to send to [facility], as this is preferred  Medical informed  Faxed [id number] and DME to [place]  Called and spoke with Lissa Guaman who confirmed received in addition to the order to discontinue Singulair

## 2020-06-26 NOTE — PROGRESS NOTES
Maggie maintained on ongoing fall and SAFE precaution  Nuryneli Barnhart in bed with eyes closed, breath even and unlabored with 1L of o2 via nasal cannula  ,    Continues rounding implemented   No somatic complaint overnight  No PRN needed for sleep aid   No indication of pain or discomfort  Will continue to monitor  Glory Roman

## 2020-06-26 NOTE — CASE MANAGEMENT
Faxed script for 1L O2 received from medical and faxed to [facility] at [phone] and [phone], along with requested demo sheet and copy insurance card, per their request  Awaiting response and will call to follow up and ensure this will be delivered directly to [facility] by Monday 6/29/20

## 2020-06-26 NOTE — PROGRESS NOTES
2135 Maggie did not attend PM Group  She declined performing Incentive Spirometry, again citing rib discomfort as the reason  "I don't do it when my ribs feel this way " Declined HS snack  A bit brittle in mood  Started laughing when this nurse questioned if she was beginning to experience discharge nerves  "I think so " Wearing now her QHS nasal O2 @ 1L for bed without the humidification bottle as distrusts 11-7, fears they may tamper w/it

## 2020-06-27 NOTE — PROGRESS NOTES
Psychiatry Progress Note    Subjective: Interval History     The patient is seen resting in bed  She states she is nervous but excited to go to [place] on Monday  She does continue to be somatic at times  Patient has been compliant with medication and meals  She is denying any depression or anxiety  She denies hallucinations      Behavior over the last 24 hours:  unchanged  Sleep: normal  Appetite: normal  Medication side effects: No  ROS: no complaints    Current medications:    Current Facility-Administered Medications:     acetaminophen (TYLENOL) tablet 325 mg, 325 mg, Oral, Q6H PRN, Jill Gayle MD    acetaminophen (TYLENOL) tablet 650 mg, 650 mg, Oral, Q6H PRN, Jill Gayle MD    acetaminophen (TYLENOL) tablet 650 mg, 650 mg, Oral, Q8H PRN, Jill Gayle MD    albuterol (PROVENTIL HFA,VENTOLIN HFA) inhaler 2 puff, 2 puff, Inhalation, Q4H PRN, Jill Gayle MD, 2 puff at 10/11/19 0424    aluminum-magnesium hydroxide-simethicone (MYLANTA) 200-200-20 mg/5 mL oral suspension 15 mL, 15 mL, Oral, Q4H PRN, Jill Gayle MD, 15 mL at 01/26/20 1021    ammonium lactate (LAC-HYDRIN) 12 % lotion 1 application, 1 application, Topical, BID PRN, Jill Gayle MD    benzonatate (TESSALON PERLES) capsule 100 mg, 100 mg, Oral, TID PRN, Jill Gayle MD    benztropine (COGENTIN) injection 1 mg, 1 mg, Intramuscular, Q8H PRN, Jill Gayle MD    carbamide peroxide [facility]) 6 5 % otic solution 5 drop, 5 drop, Left Ear, BID PRN, Jill Gayle MD, 5 drop at 04/15/20 2128    cloZAPine (CLOZARIL) tablet 25 mg, 25 mg, Oral, BID, Jill Gayle MD, 25 mg at 06/26/20 2129    cloZAPine (CLOZARIL) tablet 50 mg, 50 mg, Oral, BID, Jill Gayle MD, 50 mg at 06/26/20 2129    docusate sodium (COLACE) capsule 100 mg, 100 mg, Oral, BID PRN, Jill Gayle MD    EPINEPHrine PF (ADRENALIN) 1 mg/mL injection 0 15 mg, 0 15 mg, Intramuscular, Once PRN, Jill Gayle MD    fluticasone-vilanterol (BREO ELLIPTA) 200-25 MCG/INH inhaler 1 puff, 1 puff, Inhalation, Daily, Jaz Beasley MD, 1 puff at 06/27/20 0930    ketotifen (ZADITOR) 0 025 % ophthalmic solution 1 drop, 1 drop, Right Eye, BID PRN, Jaz Beasley MD    levothyroxine tablet 125 mcg, 125 mcg, Oral, Early Morning, Jaz Beasley MD, 125 mcg at 06/27/20 0601    magnesium hydroxide (MILK OF MAGNESIA) 400 mg/5 mL oral suspension 30 mL, 30 mL, Oral, Daily PRN, Jaz Beasley MD    OLANZapine (ZyPREXA) IM injection 5 mg, 5 mg, Intramuscular, Q8H PRN, Jaz Beasley MD    OLANZapine (ZyPREXA) tablet 5 mg, 5 mg, Oral, Q8H PRN, Jaz Beasley MD    ondansetron (ZOFRAN-ODT) dispersible tablet 4 mg, 4 mg, Oral, Q6H PRN, Jaz Beasley MD, 4 mg at 12/09/19 1757    pantoprazole (PROTONIX) EC tablet 40 mg, 40 mg, Oral, Early Morning, Jaz Beasley MD, 40 mg at 06/27/20 0601    polyethylene glycol (MIRALAX) packet 17 g, 17 g, Oral, Daily PRN, Jaz Beasley MD    polyvinyl alcohol (LIQUIFILM TEARS) 1 4 % ophthalmic solution 1 drop, 1 drop, Both Eyes, Q3H PRN, Jaz Beasley MD    sertraline (ZOLOFT) tablet 200 mg, 200 mg, Oral, Daily, Jaz Beasley MD, 200 mg at 06/27/20 0930    sucralfate (CARAFATE) oral suspension 1,000 mg, 1,000 mg, Oral, BID, Maggie Andrade MD, 1,000 mg at 06/27/20 0601    theophylline (JEF-24) 24 hr capsule 200 mg, 200 mg, Oral, Daily, Jaz Beasley MD, 200 mg at 06/27/20 0930    tiotropium (SPIRIVA) capsule for inhaler 18 mcg, 18 mcg, Inhalation, Daily, Jaz Beasley MD, 18 mcg at 06/27/20 0930    traZODone (DESYREL) tablet 25 mg, 25 mg, Oral, HS PRN, Jaz Beasley MD    Current Problem List:    Patient Active Problem List   Diagnosis    [facility] ([facility]    Tobacco use disorder, continuous    Compression fracture of L4 lumbar vertebra    Ventral hernia    Acute on chronic [facility]    Schizoaffective disorder, bipolar type [facility]    Acquired hypothyroidism    Gastroesophageal reflux disease without esophagitis    Abnormal CT of the chest    Excessive cerumen in left ear canal    Lipoma of right upper extremity    Noncompliant with deep vein thrombosis (DVT) prophylaxis    Low HDL (under 40)       Problem list reviewed 06/27/20     Objective:     Vital Signs:  Vitals:    06/25/20 2000 06/26/20 0700 06/26/20 1900 06/27/20 0821   BP: 115/68 105/71 104/56 136/76   BP Location: Left arm Left arm Left arm Right arm   Pulse: 83 92 78 80   Resp: 16 18 16 18   Temp: (!) 96 9 °F (36 1 °C) 97 9 °F (36 6 °C) 97 5 °F (36 4 °C) 97 8 °F (36 6 °C)   TempSrc: Temporal  Temporal    SpO2: 90% 92% 96%    Weight:       Height:             Appearance:  age appropriate and casually dressed   Behavior:  normal   Speech:  normal volume   Mood:  anxious   Affect:  constricted   Thought Process:  circumstantial   Thought Content:  delusions  persecutory   Perceptual Disturbances: None   Risk Potential: none   Sensorium:  person, place, situation and time   Cognition:  intact   Consciousness:  alert and awake    Attention: attention span and concentration were age appropriate   Intellect: average   Insight:  fair   Judgment: fair      Motor Activity: no abnormal movements       I/O Past 24 hours:  No intake/output data recorded  No intake/output data recorded  Labs:  Reviewed 06/27/20      Assessment / Plan:     Schizoaffective disorder, bipolar type ([facility] )    Recommended Treatment:      Medication changes:  1) continue current medication regimen    Non-pharmacological treatments  1) Continue with group therapy, milieu therapy and occupational therapy  Safety  1) Safety/communication plan established targeting dynamic risk factors above  2) Risks, benefits, and possible side effects of medications explained to patient and patient verbalizes understanding  Counseling / Coordination of Care    Total floor / unit time spent today 20 minutes  Greater than 50% of total time was spent with the patient and / or family counseling and / or coordination of care   A description of the counseling / coordination of care  Patient's Rights, confidentiality and exceptions to confidentiality, use of automated medical record, [facility] staff access to medical record, and consent to treatment reviewed      Nikos Saldaña PA-C

## 2020-06-27 NOTE — PLAN OF CARE
Problem: Alteration in Thoughts and Perception  Goal: Verbalize thoughts and feelings  Description  Interventions:  - Promote a nonjudgmental and trusting relationship with the patient through active listening and therapeutic communication  - Assess patient's level of functioning, behavior and potential for risk  - Engage patient in 1 on 1 interactions for a minimum of 15 minutes each session  - Encourage patient to express fears, feelings, frustrations, and discuss symptoms    - [place] patient to reality, help patient recognize reality-based thinking   - Administer medications as ordered and assess for potential side effects  - Provide the patient education related to the signs and symptoms of the illness and desired effects of prescribed medications  Outcome: Progressing  Goal: Agree to be compliant with medication regime, as prescribed and report medication side effects  Description  Interventions:  - Offer appropriate PRN medication and supervise ingestion; conduct aims, as needed   Outcome: Progressing     Problem: Alteration in Orientation  Goal: Interact with staff daily  Description  Interventions:  - Assess and re-assess patient's level of orientation  - Engage patient in 1 on 1 interactions, daily, for a minimum of 15 minutes   - Establish rapport/trust with patient   Outcome: Progressing     Problem: SAFETY ADULT  Goal: Patient will remain free of falls  Description  INTERVENTIONS:  - Assess patient frequently for physical needs  -  Identify cognitive and physical deficits and behaviors that affect risk of falls    -  [place] fall precautions as indicated by assessment   - Educate patient/family on patient safety including physical limitations  - Instruct patient to call for assistance with activity based on assessment  - Modify environment to reduce risk of injury  - Consider OT/PT consult to assist with strengthening/mobility  Outcome: Progressing     Problem: Alteration in Thoughts and Perception  Goal: Attend and participate in unit activities, including therapeutic, recreational, and educational groups  Description  Interventions:  - Provide therapeutic and educational activities daily, encourage attendance and participation, and document same in the medical record     CERTIFIED PEER SPECIALIST INTERVENTIONS:    Complete peer assessment with patient to assess their needs and identify their goals to complete while in the recovery program as well as once discharged into the community  Patient will complete WRAP Plan, Crisis Plan and [facility] [employer]  Patient will attend 50% of groups offered on the unit  Patient will complete a goal card weekly  Outcome: Not Progressing  Goal: Complete daily ADLs, including personal hygiene independently, as able  Description  Interventions:  - Observe, teach, and assist patient with ADLS  - Monitor and promote a balance of rest/activity, with adequate nutrition and elimination   Outcome: Not Progressing     Problem: Depression  Goal: Refrain from isolation  Description  Interventions:  - Develop a trusting relationship   - Encourage socialization   Outcome: Not Kenny Aguilar has been in her room most of the shift  Minimal interaction with peers  Able to make needs known to staff  Preoccupied with upcoming discharge in Monday  She was on the phone with insurance company and tried to get this writer to give authorization for what she wants  She was concerned with medications that will be given on discharge  "The insurance company said that you can give authorization"  "I might not be able to leave Monday if they don't get everything straightened out  Can you call someone from [employer]  There should be someone in the building"  Had to be redirected with these issues  No shower or BM  Did not attend evening groups  Took HS medication  Ate snack  To have continuous pulse ox study done overnight (without oxygen)  Continue to monitor  Precautions maintained

## 2020-06-27 NOTE — PLAN OF CARE
Problem: Alteration in Thoughts and Perception  Goal: Verbalize thoughts and feelings  Description  Interventions:  - Promote a nonjudgmental and trusting relationship with the patient through active listening and therapeutic communication  - Assess patient's level of functioning, behavior and potential for risk  - Engage patient in 1 on 1 interactions for a minimum of 15 minutes each session  - Encourage patient to express fears, feelings, frustrations, and discuss symptoms    - [place] patient to reality, help patient recognize reality-based thinking   - Administer medications as ordered and assess for potential side effects  - Provide the patient education related to the signs and symptoms of the illness and desired effects of prescribed medications  Outcome: Progressing     Problem: Ineffective Coping  Goal: Patient/Family verbalizes awareness of resources  Outcome: Progressing  Goal: Understands least restrictive measures  Description  Interventions:  - Utilize least restrictive behavior  Outcome: Progressing     Problem: Risk for Self Injury/Neglect  Goal: Treatment Goal: Remain safe during length of stay, learn and adopt new coping skills, and be free of self-injurious ideation, impulses and acts at the time of discharge  Outcome: Progressing  Goal: Verbalize thoughts and feelings  Description  Interventions:  - Assess and re-assess patient's lethality and potential for self-injury  - Engage patient in 1:1 interactions, daily, for a minimum of 15 minutes  - Encourage patient to express feelings, fears, frustrations, hopes  - Establish rapport/trust with patient   Outcome: Progressing  Goal: Refrain from harming self  Description  Interventions:  - Monitor patient closely, per order  - Develop a trusting relationship  - Supervise medication ingestion, monitor effects and side effects   Outcome: Progressing     Problem: Depression  Goal: Verbalize thoughts and feelings  Description  Interventions:  - Assess and re-assess patient's level of risk   - Engage patient in 1:1 interactions, daily, for a minimum of 15 minutes   - Encourage patient to express feelings, fears, frustrations, hopes   Outcome: Progressing     Problem: Anxiety  Goal: Anxiety is at manageable level  Description  Interventions:  - Assess and monitor patient's anxiety level  - Monitor for signs and symptoms of anxiety both physical and emotional (heart palpitations, chest pain, shortness of breath, headaches, nausea, feeling jumpy, restlessness, irritable, apprehensive)  - Collaborate with interdisciplinary team and initiate plan and interventions as ordered  - [place] patient to unit/surroundings  - Explain treatment plan  - Encourage participation in care  - Encourage verbalization of concerns/fears  - Identify coping mechanisms  - Assist in developing anxiety-reducing skills  - Administer/offer alternative therapies  - Limit or eliminate stimulants  Outcome: Progressing     Problem: PAIN - ADULT  Goal: Verbalizes/displays adequate comfort level or baseline comfort level  Description  Interventions:  - Encourage patient to monitor pain and request assistance  - Assess pain using appropriate pain scale  - Administer analgesics based on type and severity of pain and evaluate response  - Implement non-pharmacological measures as appropriate and evaluate response  - Consider cultural and social influences on pain and pain management  - Notify physician/advanced practitioner if interventions unsuccessful or patient reports new pain  Outcome: Progressing     Problem: SAFETY ADULT  Goal: Patient will remain free of falls  Description  INTERVENTIONS:  - Assess patient frequently for physical needs  -  Identify cognitive and physical deficits and behaviors that affect risk of falls    -  [place] fall precautions as indicated by assessment   - Educate patient/family on patient safety including physical limitations  - Instruct patient to call for assistance with activity based on assessment  - Modify environment to reduce risk of injury  - Consider OT/PT consult to assist with strengthening/mobility  Outcome: Progressing     Problem: Nutrition/Hydration-ADULT  Goal: Nutrient/Hydration intake appropriate for improving, restoring or maintaining nutritional needs  Description  Monitor and assess patient's nutrition/hydration status for malnutrition  Collaborate with interdisciplinary team and initiate plan and interventions as ordered  Monitor patient's weight and dietary intake as ordered or per policy  Utilize nutrition screening tool and intervene as necessary  Determine patient's food preferences and provide high-protein, high-caloric foods as appropriate       INTERVENTIONS:  - Monitor oral intake, urinary output, labs, and treatment plans  - Assess nutrition and hydration status and recommend course of action  - Evaluate amount of meals eaten  - Assist patient with eating if necessary   - Allow adequate time for meals  - Recommend/ encourage appropriate diets, oral nutritional supplements, and vitamin/mineral supplements  - Order, calculate, and assess calorie counts as needed  - Recommend, monitor, and adjust tube feedings and TPN/PPN based on assessed needs  - Assess need for intravenous fluids  - Provide specific nutrition/hydration education as appropriate  - Include patient/family/caregiver in decisions related to nutrition  Outcome: Progressing     Problem: Alteration in Thoughts and Perception  Goal: Attend and participate in unit activities, including therapeutic, recreational, and educational groups  Description  Interventions:  - Provide therapeutic and educational activities daily, encourage attendance and participation, and document same in the medical record     CERTIFIED PEER SPECIALIST INTERVENTIONS:    Complete peer assessment with patient to assess their needs and identify their goals to complete while in the recovery program as well as once discharged into the community  Patient will complete WRAP Plan, Crisis Plan and 5 Life Domains  Patient will attend 50% of groups offered on the unit  Patient will complete a goal card weekly  Outcome: Not Progressing     Problem: Ineffective Coping  Goal: Participates in unit activities  Description  Interventions:  - Provide therapeutic environment   - Provide required programming   - Redirect inappropriate behaviors   Outcome: Not Progressing     Problem: Risk for Self Injury/Neglect  Goal: Attend and participate in unit activities, including therapeutic, recreational, and educational groups  Description  Interventions:  - Provide therapeutic and educational activities daily, encourage attendance and participation, and document same in the medical record  - Obtain collateral information, encourage visitation and family involvement in care   Outcome: Not Progressing     Problem: Depression  Goal: Refrain from isolation  Description  Interventions:  - Develop a trusting relationship   - Encourage socialization   Outcome: Not Progressing  Mostly isolative to her room, napping in her bed, and did not attend any of the offered groups  Did not shower or do hygiene and looks disheveled  Ate 50% of breakfast and 25% of lunch  Med compliant  No other behaviors or issues noted  Continue to monitor

## 2020-06-27 NOTE — PROGRESS NOTES
2145 Rena Rausch was in a more relaxed mood late in shift  Was receptive to teaching about thoughts manifested; as enters next phase of life (discharge) keep thinking open to possibility & positive to get that outcome rather that fearful or negative which is limiting  Did her Incentive Spirometry achieving a consistent 1250ml volume  Had HS snack, came for HS medicine  Wearing now her QHS nasal O2 @ 1L for bed (without the humidification, her preference)

## 2020-06-28 NOTE — PROGRESS NOTES
Psychiatry Progress Note    Subjective: Interval History     The patient states she is anxious  She reports she called her insurance yesterday and needs a prior authorization on her Breo and Spiriva  She is fixated on her inhalers and breathing today  She reports she needs one liter of oxygen at night  Pt continues to have somatic complaints at times  She has been preoccupied with her upcoming discharge Monday  Pt has been compliant with her medications  Appetite is fair       Behavior over the last 24 hours:  unchanged  Sleep: normal  Appetite: normal  Medication side effects: No  ROS: no complaints    Current medications:    Current Facility-Administered Medications:     acetaminophen (TYLENOL) tablet 325 mg, 325 mg, Oral, Q6H PRN, Chichi Lockett MD    acetaminophen (TYLENOL) tablet 650 mg, 650 mg, Oral, Q6H PRN, Chichi Lockett MD    acetaminophen (TYLENOL) tablet 650 mg, 650 mg, Oral, Q8H PRN, Chichi Lokcett MD    albuterol (PROVENTIL HFA,VENTOLIN HFA) inhaler 2 puff, 2 puff, Inhalation, Q4H PRN, Chichi Lockett MD, 2 puff at 10/11/19 0424    aluminum-magnesium hydroxide-simethicone (MYLANTA) 200-200-20 mg/5 mL oral suspension 15 mL, 15 mL, Oral, Q4H PRN, Chichi Lockett MD, 15 mL at 01/26/20 1021    ammonium lactate (LAC-HYDRIN) 12 % lotion 1 application, 1 application, Topical, BID PRN, Chichi Lockett MD    benzonatate (TESSALON PERLES) capsule 100 mg, 100 mg, Oral, TID PRN, Chichi Lockett MD    benztropine (COGENTIN) injection 1 mg, 1 mg, Intramuscular, Q8H PRN, Chichi Lockett MD    carbamide peroxide [place] [facility]) 6 5 % otic solution 5 drop, 5 drop, Left Ear, BID PRN, Chichi Lockett MD, 5 drop at 04/15/20 2128    cloZAPine (CLOZARIL) tablet 25 mg, 25 mg, Oral, BID, Chichi Lockett MD, 25 mg at 06/27/20 2053    cloZAPine (CLOZARIL) tablet 50 mg, 50 mg, Oral, BID, Chichi Lockett MD, 50 mg at 06/27/20 2053    docusate sodium (COLACE) capsule 100 mg, 100 mg, Oral, BID PRN, Chichi Lockett MD    EPINEPHrine PF (ADRENALIN) 1 mg/mL injection 0 15 mg, 0 15 mg, Intramuscular, Once PRN, Vincent Olivares MD    fluticasone-vilanterol (BREO ELLIPTA) 200-25 MCG/INH inhaler 1 puff, 1 puff, Inhalation, Daily, Vincent Olivares MD, 1 puff at 06/28/20 0842    ketotifen (ZADITOR) 0 025 % ophthalmic solution 1 drop, 1 drop, Right Eye, BID PRN, Vincent Olivares MD    levothyroxine tablet 125 mcg, 125 mcg, Oral, Early Morning, Vincent Olivares MD, 125 mcg at 06/28/20 0602    magnesium hydroxide (MILK OF MAGNESIA) 400 mg/5 mL oral suspension 30 mL, 30 mL, Oral, Daily PRN, Vincent Olivares MD    OLANZapine (ZyPREXA) IM injection 5 mg, 5 mg, Intramuscular, Q8H PRN, Vincent Olivares MD    OLANZapine (ZyPREXA) tablet 5 mg, 5 mg, Oral, Q8H PRN, Vincent Olivares MD    ondansetron (ZOFRAN-ODT) dispersible tablet 4 mg, 4 mg, Oral, Q6H PRN, Vincent Olivares MD, 4 mg at 12/09/19 1757    pantoprazole (PROTONIX) EC tablet 40 mg, 40 mg, Oral, Early Morning, Vincent Olivares MD, 40 mg at 06/28/20 0602    polyethylene glycol (MIRALAX) packet 17 g, 17 g, Oral, Daily PRN, Vincent Olivares MD    polyvinyl alcohol (LIQUIFILM TEARS) 1 4 % ophthalmic solution 1 drop, 1 drop, Both Eyes, Q3H PRN, Vincent Olivares MD    sertraline (ZOLOFT) tablet 200 mg, 200 mg, Oral, Daily, Vincent Olivares MD, 200 mg at 06/28/20 0841    sucralfate (CARAFATE) oral suspension 1,000 mg, 1,000 mg, Oral, BID, Cat Torres MD, 1,000 mg at 06/28/20 0602    theophylline (JEF-24) 24 hr capsule 200 mg, 200 mg, Oral, Daily, Vincent Olivares MD, 200 mg at 06/28/20 0841    tiotropium (SPIRIVA) capsule for inhaler 18 mcg, 18 mcg, Inhalation, Daily, Vincent Olivares MD, 18 mcg at 06/28/20 0842    traZODone (DESYREL) tablet 25 mg, 25 mg, Oral, HS PRN, Vincent Olivares MD    Current Problem List:    Patient Active Problem List   Diagnosis    COPD [facility] asthma [facility]    Tobacco use disorder, continuous    Compression fracture of L4 lumbar vertebra    Ventral hernia    Acute on chronic respiratory failure with hypoxia ([facility] )    Schizoaffective disorder, bipolar type ([facility] )    Acquired hypothyroidism    Gastroesophageal reflux disease without esophagitis    Abnormal CT of the chest    Excessive cerumen in left ear canal    Lipoma of right upper extremity    Noncompliant with deep vein thrombosis (DVT) prophylaxis    Low HDL (under 40)       Problem list reviewed 06/28/20     Objective:     Vital Signs:  Vitals:    06/27/20 2000 06/27/20 2304 06/28/20 0616 06/28/20 0900   BP: 102/68   144/76   BP Location: Left arm   Left arm   Pulse: 96   97   Resp: 15   18   Temp: 97 8 °F (36 6 °C)   97 8 °F (36 6 °C)   TempSrc: Temporal   Temporal   SpO2: 93% 92% (!) 88%    Weight:    64 9 kg (143 lb)   Height:             Appearance:  age appropriate and casually dressed   Behavior:  normal   Speech:  normal volume   Mood:  anxious   Affect:  constricted   Thought Process:  circumstantial   Thought Content:  delusions  persecutory   Perceptual Disturbances: None   Risk Potential: none   Sensorium:  person, place, situation and time   Cognition:  intact   Consciousness:  alert and awake    Attention: attention span and concentration were age appropriate   Intellect: average   Insight:  fair   Judgment: fair      Motor Activity: no abnormal movements       I/O Past 24 hours:  No intake/output data recorded  No intake/output data recorded  Labs:  Reviewed 06/28/20      Assessment / Plan:     Schizoaffective disorder, bipolar type ([facility] )    Recommended Treatment:      Medication changes:  1) continue current medication regimen    Non-pharmacological treatments  1) Continue with group therapy, milieu therapy and occupational therapy  Safety  1) Safety/communication plan established targeting dynamic risk factors above  2) Risks, benefits, and possible side effects of medications explained to patient and patient verbalizes understanding  Counseling / Coordination of Care    Total floor / unit time spent today 20 minutes   Greater than 50% of total time was spent with the patient and / or family counseling and / or coordination of care  A description of the counseling / coordination of care  Patient's Rights, confidentiality and exceptions to confidentiality, use of automated medical record, [facility] staff access to medical record, and consent to treatment reviewed      Pita Cage PA-C

## 2020-06-28 NOTE — PLAN OF CARE
Problem: Alteration in Thoughts and Perception  Goal: Verbalize thoughts and feelings  Description  Interventions:  - Promote a nonjudgmental and trusting relationship with the patient through active listening and therapeutic communication  - Assess patient's level of functioning, behavior and potential for risk  - Engage patient in 1 on 1 interactions for a minimum of 15 minutes each session  - Encourage patient to express fears, feelings, frustrations, and discuss symptoms    - [place] patient to reality, help patient recognize reality-based thinking   - Administer medications as ordered and assess for potential side effects  - Provide the patient education related to the signs and symptoms of the illness and desired effects of prescribed medications  Outcome: Progressing  Goal: Agree to be compliant with medication regime, as prescribed and report medication side effects  Description  Interventions:  - Offer appropriate PRN medication and supervise ingestion; conduct aims, as needed   Outcome: Progressing  Goal: Complete daily ADLs, including personal hygiene independently, as able  Description  Interventions:  - Observe, teach, and assist patient with ADLS  - Monitor and promote a balance of rest/activity, with adequate nutrition and elimination   Outcome: Progressing     Problem: Risk for Self Injury/Neglect  Goal: Verbalize thoughts and feelings  Description  Interventions:  - Assess and re-assess patient's lethality and potential for self-injury  - Engage patient in 1:1 interactions, daily, for a minimum of 15 minutes  - Encourage patient to express feelings, fears, frustrations, hopes  - Establish rapport/trust with patient   Outcome: Progressing     Problem: Alteration in Orientation  Goal: Interact with staff daily  Description  Interventions:  - Assess and re-assess patient's level of orientation  - Engage patient in 1 on 1 interactions, daily, for a minimum of 15 minutes   - Establish rapport/trust with patient   Outcome: Progressing     Problem: PAIN - ADULT  Goal: Verbalizes/displays adequate comfort level or baseline comfort level  Description  Interventions:  - Encourage patient to monitor pain and request assistance  - Assess pain using appropriate pain scale  - Administer analgesics based on type and severity of pain and evaluate response  - Implement non-pharmacological measures as appropriate and evaluate response  - Consider cultural and social influences on pain and pain management  - Notify physician/advanced practitioner if interventions unsuccessful or patient reports new pain  Outcome: Progressing     Problem: Alteration in Thoughts and Perception  Goal: Attend and participate in unit activities, including therapeutic, recreational, and educational groups  Description  Interventions:  - Provide therapeutic and educational activities daily, encourage attendance and participation, and document same in the medical record     CERTIFIED PEER SPECIALIST INTERVENTIONS:    Complete peer assessment with patient to assess their needs and identify their goals to complete while in the recovery program as well as once discharged into the community  Patient will complete WRAP Plan, Crisis Plan and 5 Life Domains  Patient will attend 50% of groups offered on the unit  Patient will complete a goal card weekly  Outcome: Not Progressing     Problem: Depression  Goal: Refrain from isolation  Description  Interventions:  - Develop a trusting relationship   - Encourage socialization   Outcome: Not Progressing     Oscar Duncan was asleep in her room at the beginning of the shift  She was questioning about results of her study from last night  No results are noted and she was reminded that her doctor would need to speak with her about any results of testing that is done  Has not been somatic  Showered this evening  Took medication with prompting  Ate 100% of her meal  Made a couple phone call   Did not go out for fresh air or go to evening group  Took HS medication  Ate snack  Oxygen 1 liter via nasal cannula at HS  Continue to monitor  Precautions maintained

## 2020-06-28 NOTE — PLAN OF CARE
Problem: Alteration in Thoughts and Perception  Goal: Verbalize thoughts and feelings  Description  Interventions:  - Promote a nonjudgmental and trusting relationship with the patient through active listening and therapeutic communication  - Assess patient's level of functioning, behavior and potential for risk  - Engage patient in 1 on 1 interactions for a minimum of 15 minutes each session  - Encourage patient to express fears, feelings, frustrations, and discuss symptoms    - [place] patient to reality, help patient recognize reality-based thinking   - Administer medications as ordered and assess for potential side effects  - Provide the patient education related to the signs and symptoms of the illness and desired effects of prescribed medications  Outcome: Progressing  Goal: Agree to be compliant with medication regime, as prescribed and report medication side effects  Description  Interventions:  - Offer appropriate PRN medication and supervise ingestion; conduct aims, as needed   Outcome: Progressing     Problem: Ineffective Coping  Goal: Patient/Family verbalizes awareness of resources  Outcome: Progressing  Goal: Understands least restrictive measures  Description  Interventions:  - Utilize least restrictive behavior  Outcome: Progressing     Problem: Risk for Self Injury/Neglect  Goal: Treatment Goal: Remain safe during length of stay, learn and adopt new coping skills, and be free of self-injurious ideation, impulses and acts at the time of discharge  Outcome: Progressing  Goal: Verbalize thoughts and feelings  Description  Interventions:  - Assess and re-assess patient's lethality and potential for self-injury  - Engage patient in 1:1 interactions, daily, for a minimum of 15 minutes  - Encourage patient to express feelings, fears, frustrations, hopes  - Establish rapport/trust with patient   Outcome: Progressing  Goal: Refrain from harming self  Description  Interventions:  - Monitor patient closely, per order  - Develop a trusting relationship  - Supervise medication ingestion, monitor effects and side effects   Outcome: Progressing     Problem: Depression  Goal: Verbalize thoughts and feelings  Description  Interventions:  - Assess and re-assess patient's level of risk   - Engage patient in 1:1 interactions, daily, for a minimum of 15 minutes   - Encourage patient to express feelings, fears, frustrations, hopes   Outcome: Progressing     Problem: Anxiety  Goal: Anxiety is at manageable level  Description  Interventions:  - Assess and monitor patient's anxiety level  - Monitor for signs and symptoms of anxiety both physical and emotional (heart palpitations, chest pain, shortness of breath, headaches, nausea, feeling jumpy, restlessness, irritable, apprehensive)  - Collaborate with interdisciplinary team and initiate plan and interventions as ordered    - [place] patient to unit/surroundings  - Explain treatment plan  - Encourage participation in care  - Encourage verbalization of concerns/fears  - Identify coping mechanisms  - Assist in developing anxiety-reducing skills  - Administer/offer alternative therapies  - Limit or eliminate stimulants  Outcome: Progressing     Problem: Alteration in Orientation  Goal: Interact with staff daily  Description  Interventions:  - Assess and re-assess patient's level of orientation  - Engage patient in 1 on 1 interactions, daily, for a minimum of 15 minutes   - Establish rapport/trust with patient   Outcome: Progressing     Problem: PAIN - ADULT  Goal: Verbalizes/displays adequate comfort level or baseline comfort level  Description  Interventions:  - Encourage patient to monitor pain and request assistance  - Assess pain using appropriate pain scale  - Administer analgesics based on type and severity of pain and evaluate response  - Implement non-pharmacological measures as appropriate and evaluate response  - Consider cultural and social influences on pain and pain management  - Notify physician/advanced practitioner if interventions unsuccessful or patient reports new pain  Outcome: Progressing     Problem: SAFETY ADULT  Goal: Patient will remain free of falls  Description  INTERVENTIONS:  - Assess patient frequently for physical needs  -  Identify cognitive and physical deficits and behaviors that affect risk of falls  -  [place] fall precautions as indicated by assessment   - Educate patient/family on patient safety including physical limitations  - Instruct patient to call for assistance with activity based on assessment  - Modify environment to reduce risk of injury  - Consider OT/PT consult to assist with strengthening/mobility  Outcome: Progressing     Problem: Nutrition/Hydration-ADULT  Goal: Nutrient/Hydration intake appropriate for improving, restoring or maintaining nutritional needs  Description  Monitor and assess patient's nutrition/hydration status for malnutrition  Collaborate with interdisciplinary team and initiate plan and interventions as ordered  Monitor patient's weight and dietary intake as ordered or per policy  Utilize nutrition screening tool and intervene as necessary  Determine patient's food preferences and provide high-protein, high-caloric foods as appropriate       INTERVENTIONS:  - Monitor oral intake, urinary output, labs, and treatment plans  - Assess nutrition and hydration status and recommend course of action  - Evaluate amount of meals eaten  - Assist patient with eating if necessary   - Allow adequate time for meals  - Recommend/ encourage appropriate diets, oral nutritional supplements, and vitamin/mineral supplements  - Order, calculate, and assess calorie counts as needed  - Recommend, monitor, and adjust tube feedings and TPN/PPN based on assessed needs  - Assess need for intravenous fluids  - Provide specific nutrition/hydration education as appropriate  - Include patient/family/caregiver in decisions related to nutrition  Outcome: Progressing     Problem: Alteration in Thoughts and Perception  Goal: Treatment Goal: Gain control of psychotic behaviors/thinking, reduce/eliminate presenting symptoms and demonstrate improved reality functioning upon discharge  Outcome: Not Progressing  Goal: Attend and participate in unit activities, including therapeutic, recreational, and educational groups  Description  Interventions:  - Provide therapeutic and educational activities daily, encourage attendance and participation, and document same in the medical record     CERTIFIED PEER SPECIALIST INTERVENTIONS:    Complete peer assessment with patient to assess their needs and identify their goals to complete while in the recovery program as well as once discharged into the community  Patient will complete WRAP Plan, Crisis Plan and [employer]  Patient will attend 50% of groups offered on the unit  Patient will complete a goal card weekly      Outcome: Not Progressing  Goal: Recognize dysfunctional thoughts, communicate reality-based thoughts at the time of discharge  Description  Interventions:  - Provide medication and psycho-education to assist patient in compliance and developing insight into his/her illness   Outcome: Not Progressing     Problem: Ineffective Coping  Goal: Participates in unit activities  Description  Interventions:  - Provide therapeutic environment   - Provide required programming   - Redirect inappropriate behaviors   Outcome: Not Progressing     Problem: Risk for Self Injury/Neglect  Goal: Attend and participate in unit activities, including therapeutic, recreational, and educational groups  Description  Interventions:  - Provide therapeutic and educational activities daily, encourage attendance and participation, and document same in the medical record  - Obtain collateral information, encourage visitation and family involvement in care   Outcome: Not Progressing  Goal: Recognize maladaptive responses and adopt new coping mechanisms  Outcome: Not Progressing     Problem: Depression  Goal: Treatment Goal: Demonstrate behavioral control of depressive symptoms, verbalize feelings of improved mood/affect, and adopt new coping skills prior to discharge  Outcome: Not Progressing  Goal: Refrain from isolation  Description  Interventions:  - Develop a trusting relationship   - Encourage socialization   Outcome: Not Progressing   Mostly isolative to her room, napping in her bed  Did not attend any of the offered groups  Remains paranoid and suspicious of staff and her health  Anxious and worrisome this morning r/t her overnight pulse oximetry study, insisting that staff are lying about what her "true numbers" are  Attempted to reality orient her and rationalize with her as to why staff would want to purposely alter her pulse oximetry readings  All attempts were unsuccessful as she maintained the notion that staff were conspiring against her  It also should be noted that it was reported by her room mate that the patient had told her that the patient had been purposely holding her breath as to try to make the pulse oximetry readings lower  Ate 50% of breakfast and % of lunch  Med compliant  No other behaviors or issues noted  Continue to monitor

## 2020-06-28 NOTE — PROGRESS NOTES
Maggie maintained on ongoing fall and SAFE precaution  Lolis Torres in bed with eyes closed, breath even and unlabored   Tonight she is sleeping without the o2 on and been monitor for period of deceleration via  pulse oximeter overnight   Continues rounding implemented   No somatic complaint overnight  No PRN needed for sleep aid   No indication of pain or discomfort  Will continue to monitor  Karla Hernandez

## 2020-06-29 NOTE — PROGRESS NOTES
06/29/20 1100   Activity/Group Checklist   Group   (IMR/Stages of Change )   Attendance Did not attend   Attendance Duration (min) 46-60   Affect/Mood IRMA

## 2020-06-29 NOTE — PROGRESS NOTES
Faxed COVID test results to [facility]  Verified by phone call that [facility] received a fax the the aobe mentioned test result  Voice message left for Sandy Parsons at [facility] notifying that overnight pulse testing results have been faxed  Call back number given to verify receiving tis fax, awaiting call back    Message also left with Sandy Parsons to reminf that the oxygen is to be delivered to the [facility] address, not to the hospital

## 2020-06-29 NOTE — NURSING NOTE
Patient was discharged to [facility] at 1430 accompanied by her sister, Denny Young, who is transporting her there

## 2020-06-29 NOTE — PROGRESS NOTES
~Maggie maintained on ongoing fall and SAFE precaution   Laying in bed with eyes closed, breath even and unlabored with 1L of o2 via nasal cannula  ,    Continues rounding implemented   No somatic complaint overnight  No PRN needed for sleep aid   No indication of pain or discomfort  Will continue to monitor  ~Maggie will be discharge from [facility] program and transfer to [facility] around 1400 transported by her sister

## 2020-06-29 NOTE — PROGRESS NOTES
06/29/20 0900   Activity/Group Checklist   Group Community meeting   Attendance Did not attend   Attendance Duration (min) 31-45   Affect/Mood IRMA

## 2020-06-29 NOTE — PROGRESS NOTES
Psychiatry Progress Note [address] 63 y o  female MRN: [id number]  [facility] [place] [id number] Encounter: [id number]  Code Status: Level 1 - Full Code    PCP: Galen Torres PA-C    Date of Admission:  7/23/2019 1730   Date of Service:  06/29/20  Patient Active Problem List   Diagnosis    COPD with asthma ([facility] )    Tobacco use disorder, continuous    Compression fracture of L4 lumbar vertebra    Ventral hernia    Acute on chronic respiratory failure with hypoxia ([facility] )    Schizoaffective disorder, bipolar type ([facility] )    Acquired hypothyroidism    Gastroesophageal reflux disease without esophagitis    Abnormal CT of the chest    Excessive cerumen in left ear canal    Lipoma of right upper extremity    Noncompliant with deep vein thrombosis (DVT) prophylaxis    Low HDL (under 40)     Diagnosis schizoaffective bipolar  Assessment    Overall Status:  Getting anxious about discharge later today claiming that her meds are not prior authorized but  did check with the [facility] pharmacy and they told as that everything is in order he    Certification Statement to demonstrate a period of stability by attending to ADLs and becoming less psychosomatic in attending more groups    Acceptance by patient:  Accepting  Metrilo Jobs in recovery:  Living at another personal care home   Understanding of medications:  Patient is aware about risks side effects benefits and precautions of medications   Involved in reintegration process: On hold due to [address] in relationship with psychiatrist:  Trusting sometimes    Medication changes   Will order hydroxyzine 25 mg as needed p r n  For anxiety  Non-pharmacological treatments   Continue with individual, group, milieu and occupational therapy using recovery principles and psycho-education about accepting illness and the need for treatment     Encourage to take showers twice a week and cooperate with treatment and adl skills as per her behav  Plan   Prepare for the [facility] and encouraged to accept  rather than refuse it   Reminded to attend at least 25% of groups on a daily basis including IMR  To order COVID-19 test and a CBC today prior to anticipated discharge on Monday    Safety   Safety and communication plan established to target dynamic risk factors discussed above  Discharge Plan   · back to a [facility] at [address]  Patient was appearing very anxious preoccupied about discharge having that medications are not the but which checked with the pharmacy and found out that everything was in order  She then kept on asking about her oxygen other things and I did remind her that everything will be there  She was concerned about the nasal oxygen and I did remind her that it is being taken care of as well  She is very psychosomatic overwhelmed and she needed a lot of reassurance and support to come her down and finally she did  She continues to present with psychosomatic symptoms as usual and reluctant to attend to ADLs  She has been trying to attend some of the groups and understands that she needs to take a shower more often  Looking forward to getting out where it today with her sister  Change in last 24 hours:  None significant and did attend CSP meeting accepting the discharge today  Sleep:   Fair  Appetite:   Fair but she picks and chooses her food  Compliance with medications:  Good  Side effects:   none but claims to feel tired sometimes  Review of systems:  Continues to have psychosomatic symptoms as usual  Group attendance:  Poor but improving  Significant changes:  None    Mental Status Exam  Appearance:    Somewhat anxious about discharge but better groomed but continues to look older than her stated age, with combed hair and  wearing clean clothes   Has good eye contact   Suspicious in her interaction      Behavior:    Friendly pleasant but anxious and less suspicious and preoccupied  Speech:    tangential at times with tendency to become stilted   Mood:     anxious sad with tendency to be irritated at times,    Affect:    increased in intensity range mood congruent redirectable and her affect was brighter at times  Thought Process:   Circumstantial with tendency to have stilted speech   Thought Content:   Still with some paranoia about motives of staff switching her medications or tampering with her inhalant or her oxygen concentrator   No current suicidal homicidal thoughts intent or plans reported  No phobias but has obsessions and compulsions about examining her pills before she takes them  Still gets occasional about dying by choking from food and from heart attack or from shortness of breath and now from catching Covid-19 which are all ongoing beliefs and now verbalizes she has "psychosomatic " sxs  She is receptive to verbal reassuarance and support and redirection    Perceptual Disturbances:  Claims to hear voices but cannot decipher other than from a lady but not commanding    Risk Potential:   Ability to care for herself and refusal to take showers  Sensorium:   fully oriented to place, person, time, date, day, month, year and to situation  Cognition:    Grossly intact, aware of current events like the corona virus situation, recent and remote memory grossly intact     No language deficit  Consciousness:   Alert and awake  Attention and concentration:  fair  Intellect:    average  Insight:    improving  Judgment:    fair  Motor Activity:  No abnormal involuntary movement noted today    Vitals  Temp:  [96 7 °F (35 9 °C)-97 4 °F (36 3 °C)] 96 7 °F (35 9 °C)  HR:  [] 102  Resp:  [18-19] 19  BP: ()/(56-71) 98/56  SpO2:  [93 %-94 %] 93 %  No intake or output data in the 24 hours ending 06/29/20 1055    Lab Results: No New Labs Available For Today  Results from last 7 days   Lab Units 06/25/20  1010   WBC Thousand/uL 7 70   RBC Million/uL 4 64   HEMOGLOBIN g/dL 14 7 HEMATOCRIT % 43 5   MCV fL 94   PLATELETS Thousands/uL 213   NEUTROS ABS Thousands/µL 4 60         Current Facility-Administered Medications:  acetaminophen 325 mg Oral Q6H PRN Jeffrey Gallegos MD   acetaminophen 650 mg Oral Q6H PRN Jeffrey Gallegos MD   acetaminophen 650 mg Oral Q8H PRN Jeffrey Gallegos MD   albuterol 2 puff Inhalation Q4H PRN Jeffrey Gallegos MD   aluminum-magnesium hydroxide-simethicone 15 mL Oral Q4H PRN Jeffrey Gallegos MD   ammonium lactate 1 application Topical BID PRN Jeffrey Gallegos MD   benzonatate 100 mg Oral TID PRN Jeffrey Gallegos MD   benztropine 1 mg Intramuscular Q8H PRN Jeffrey Gallegos MD   carbamide peroxide 5 drop Left Ear BID PRN Jeffrey Gallegos MD   cloZAPine 25 mg Oral BID Jeffrey Gallegos MD   cloZAPine 50 mg Oral BID Jeffrey Gallegos MD   docusate sodium 100 mg Oral BID PRN Jeffrey Gallegos MD   EPINEPHrine PF 0 15 mg Intramuscular Once PRN Jeffrey Gallegos MD   fluticasone-vilanterol 1 puff Inhalation Daily Jeffrey Gallegos MD   hydrOXYzine HCL 25 mg Oral Q6H PRN Jeffrey Gallegos MD   ketotifen 1 drop Right Eye BID PRN Jeffrey Gallegos MD   levothyroxine 125 mcg Oral Early Morning Jeffrey Gallegos MD   magnesium hydroxide 30 mL Oral Daily PRN Jeffrey Gallegos MD   OLANZapine 5 mg Intramuscular Q8H PRN Jeffrey Gallegos MD   OLANZapine 5 mg Oral Q8H PRN Jeffrey Gallegos MD   ondansetron 4 mg Oral Q6H PRN Jeffrey Gallegos MD   pantoprazole 40 mg Oral Early Morning Jeffrey Gallegos MD   polyethylene glycol 17 g Oral Daily PRN Jeffrey Gallegos MD   polyvinyl alcohol 1 drop Both Eyes Q3H PRN Jeffrey Gallegos MD   sertraline 200 mg Oral Daily Jeffrey Gallegos MD   sucralfate 1,000 mg Oral BID Demetrice Cabezas MD   theophylline 200 mg Oral Daily Jeffrey Gallegos MD   tiotropium 18 mcg Inhalation Daily Jeffrey Gallegos MD   traZODone 25 mg Oral HS PRN Jeffrey Gallegos MD       Counseling / Coordination of Care: Total floor / unit time spent today 15 minutes   Greater than 50% of total time was spent with the patient and / or family counseling and / or somewhat receptive to supportive listening and teaching positive coping skills to deal with symptom mangement  Patient's Rights, confidentiality and exceptions to confidentiality, use of automated medical record, Jeb Patrick staff access to medical record, and consent to treatment reviewed

## 2020-06-29 NOTE — SOCIAL WORK
Phone call placed to [facility]; spoke to Sabiha Pelaez  She reports that all medications have been filled and delivered to [facility]  She states that there are no issues with prior authorizations at this time

## 2020-06-29 NOTE — PROGRESS NOTES
06/29/20 0946   Team Meeting   Meeting Type Daily Rounds   Initial Conference Date 06/29/20   Patient/Family Present   Patient Present No   Patient's Family Present No     Daily Rounds Documentation     Team Members Present:   MD Osmin Sanchez, RN  Leigh Ann Neal, [address] [place] [address]    Discharge today at [facility]   Somatic

## 2020-06-29 NOTE — PLAN OF CARE
Problem: Alteration in Thoughts and Perception  Goal: Treatment Goal: Gain control of psychotic behaviors/thinking, reduce/eliminate presenting symptoms and demonstrate improved reality functioning upon discharge  Outcome: Adequate for Discharge  Goal: Verbalize thoughts and feelings  Description  Interventions:  - Promote a nonjudgmental and trusting relationship with the patient through active listening and therapeutic communication  - Assess patient's level of functioning, behavior and potential for risk  - Engage patient in 1 on 1 interactions for a minimum of 15 minutes each session  - Encourage patient to express fears, feelings, frustrations, and discuss symptoms    - [place] patient to reality, help patient recognize reality-based thinking   - Administer medications as ordered and assess for potential side effects  - Provide the patient education related to the signs and symptoms of the illness and desired effects of prescribed medications  Outcome: Adequate for Discharge  Goal: Agree to be compliant with medication regime, as prescribed and report medication side effects  Description  Interventions:  - Offer appropriate PRN medication and supervise ingestion; conduct aims, as needed   Outcome: Adequate for Discharge  Goal: Attend and participate in unit activities, including therapeutic, recreational, and educational groups  Description  Interventions:  - Provide therapeutic and educational activities daily, encourage attendance and participation, and document same in the medical record     CERTIFIED PEER SPECIALIST INTERVENTIONS:    Complete peer assessment with patient to assess their needs and identify their goals to complete while in the recovery program as well as once discharged into the community  Patient will complete WRAP Plan, Crisis Plan and 5 Life Domains  Patient will attend 50% of groups offered on the unit  Patient will complete a goal card weekly      Outcome: Adequate for Discharge  Goal: Recognize dysfunctional thoughts, communicate reality-based thoughts at the time of discharge  Description  Interventions:  - Provide medication and psycho-education to assist patient in compliance and developing insight into his/her illness   Outcome: Adequate for Discharge  Goal: Complete daily ADLs, including personal hygiene independently, as able  Description  Interventions:  - Observe, teach, and assist patient with ADLS  - Monitor and promote a balance of rest/activity, with adequate nutrition and elimination   Outcome: Adequate for Discharge     Problem: Ineffective Coping  Goal: Identifies ineffective coping skills  Outcome: Adequate for Discharge  Goal: Identifies healthy coping skills  Outcome: Adequate for Discharge  Goal: Demonstrates healthy coping skills  Outcome: Adequate for Discharge  Goal: Participates in unit activities  Description  Interventions:  - Provide therapeutic environment   - Provide required programming   - Redirect inappropriate behaviors   Outcome: Adequate for Discharge  Goal: Patient/Family participate in treatment and DC plans  Description  Interventions:  - Provide therapeutic environment  Outcome: Adequate for Discharge  Goal: Patient/Family verbalizes awareness of resources  Outcome: Adequate for Discharge  Goal: Understands least restrictive measures  Description  Interventions:  - Utilize least restrictive behavior  Outcome: Adequate for Discharge     Problem: Risk for Self Injury/Neglect  Goal: Treatment Goal: Remain safe during length of stay, learn and adopt new coping skills, and be free of self-injurious ideation, impulses and acts at the time of discharge  Outcome: Adequate for Discharge  Goal: Verbalize thoughts and feelings  Description  Interventions:  - Assess and re-assess patient's lethality and potential for self-injury  - Engage patient in 1:1 interactions, daily, for a minimum of 15 minutes  - Encourage patient to express feelings, fears, frustrations, hopes  - Establish rapport/trust with patient   Outcome: Adequate for Discharge  Goal: Refrain from harming self  Description  Interventions:  - Monitor patient closely, per order  - Develop a trusting relationship  - Supervise medication ingestion, monitor effects and side effects   Outcome: Adequate for Discharge  Goal: Attend and participate in unit activities, including therapeutic, recreational, and educational groups  Description  Interventions:  - Provide therapeutic and educational activities daily, encourage attendance and participation, and document same in the medical record  - Obtain collateral information, encourage visitation and family involvement in care   Outcome: Adequate for Discharge  Goal: Recognize maladaptive responses and adopt new coping mechanisms  Outcome: Adequate for Discharge  Goal: Complete daily ADLs, including personal hygiene independently, as able  Description  Interventions:  - Observe, teach, and assist patient with ADLS  - Monitor and promote a balance of rest/activity, with adequate nutrition and elimination  Outcome: Adequate for Discharge     Problem: Depression  Goal: Treatment Goal: Demonstrate behavioral control of depressive symptoms, verbalize feelings of improved mood/affect, and adopt new coping skills prior to discharge  Outcome: Adequate for Discharge  Goal: Verbalize thoughts and feelings  Description  Interventions:  - Assess and re-assess patient's level of risk   - Engage patient in 1:1 interactions, daily, for a minimum of 15 minutes   - Encourage patient to express feelings, fears, frustrations, hopes   Outcome: Adequate for Discharge  Goal: Refrain from isolation  Description  Interventions:  - Develop a trusting relationship   - Encourage socialization   Outcome: Adequate for Discharge  Goal: Refrain from self-neglect  Outcome: Adequate for Discharge     Problem: Anxiety  Goal: Anxiety is at manageable level  Description  Interventions:  - Assess and monitor patient's anxiety level  - Monitor for signs and symptoms of anxiety both physical and emotional (heart palpitations, chest pain, shortness of breath, headaches, nausea, feeling jumpy, restlessness, irritable, apprehensive)  - Collaborate with interdisciplinary team and initiate plan and interventions as ordered    - [place] patient to unit/surroundings  - Explain treatment plan  - Encourage participation in care  - Encourage verbalization of concerns/fears  - Identify coping mechanisms  - Assist in developing anxiety-reducing skills  - Administer/offer alternative therapies  - Limit or eliminate stimulants  Outcome: Adequate for Discharge     Problem: Alteration in Orientation  Goal: Interact with staff daily  Description  Interventions:  - Assess and re-assess patient's level of orientation  - Engage patient in 1 on 1 interactions, daily, for a minimum of 15 minutes   - Establish rapport/trust with patient   Outcome: Adequate for Discharge  Goal: Cooperate with recommended testing/procedures  Description  Interventions:  - Determine need for ancillary testing  - Observe for mental status changes  - Implement falls/precaution protocol   Outcome: Adequate for Discharge     Problem: PAIN - ADULT  Goal: Verbalizes/displays adequate comfort level or baseline comfort level  Description  Interventions:  - Encourage patient to monitor pain and request assistance  - Assess pain using appropriate pain scale  - Administer analgesics based on type and severity of pain and evaluate response  - Implement non-pharmacological measures as appropriate and evaluate response  - Consider cultural and social influences on pain and pain management  - Notify physician/advanced practitioner if interventions unsuccessful or patient reports new pain  Outcome: Adequate for Discharge     Problem: SAFETY ADULT  Goal: Patient will remain free of falls  Description  INTERVENTIONS:  - Assess patient frequently for physical needs  -  Identify cognitive and physical deficits and behaviors that affect risk of falls    -  [place] fall precautions as indicated by assessment   - Educate patient/family on patient safety including physical limitations  - Instruct patient to call for assistance with activity based on assessment  - Modify environment to reduce risk of injury  - Consider OT/PT consult to assist with strengthening/mobility  Outcome: Adequate for Discharge     Problem: RESPIRATORY - ADULT  Goal: Achieves optimal ventilation and oxygenation  Description  INTERVENTIONS:  - Assess for changes in respiratory status  - Assess for changes in mentation and behavior  - Position to facilitate oxygenation and minimize respiratory effort  - Oxygen administration by appropriate delivery method based on oxygen saturation (per order) or ABGs  - Initiate smoking cessation education as indicated  - Encourage broncho-pulmonary hygiene including cough, deep breathe, Incentive Spirometry  - Assess the need for suctioning and aspirate as needed  - Assess and instruct to report SOB or any respiratory difficulty  - Respiratory Therapy support as indicated  Outcome: Adequate for Discharge     Problem: DISCHARGE PLANNING  Goal: Discharge to home or other facility with appropriate resources  Description  INTERVENTIONS:  - Conduct assessment to determine patient/family and health care team treatment goals, and need for post-acute services based on payer coverage, community resources, and patient preferences, and barriers to discharge  - Address psychosocial, clinical, and financial barriers to discharge as identified in assessment in conjunction with the patient/family and health care team  - Assist the patient in reintegration back into the community by removing barriers which may hinder a successful discharge once deemed stable  - Arrange appropriate level of post-acute services according to patient's needs and preference and payer coverage in collaboration with the physician and health care team  - Communicate with and update the patient/family, physician, and health care team regarding progress on the discharge plan  - Arrange appropriate transportation to post-acute venues    Outcome: Adequate for Discharge     Problem: SLEEP DISTURBANCE  Goal: Will exhibit normal sleeping pattern  Description  Interventions:  -  Assess the patients sleep pattern, noting recent changes  - Administer medication as ordered  - Decrease environmental stimuli, including noise, as appropriate during the night  - Encourage the patient to actively participate in unit groups and or exercise during the day to enhance ability to achieve adequate sleep at night  - Assess the patient, in the morning, encouraging a description of sleep experience  Outcome: Adequate for Discharge     Problem: Nutrition/Hydration-ADULT  Goal: Nutrient/Hydration intake appropriate for improving, restoring or maintaining nutritional needs  Description  Monitor and assess patient's nutrition/hydration status for malnutrition  Collaborate with interdisciplinary team and initiate plan and interventions as ordered  Monitor patient's weight and dietary intake as ordered or per policy  Utilize nutrition screening tool and intervene as necessary  Determine patient's food preferences and provide high-protein, high-caloric foods as appropriate       INTERVENTIONS:  - Monitor oral intake, urinary output, labs, and treatment plans  - Assess nutrition and hydration status and recommend course of action  - Evaluate amount of meals eaten  - Assist patient with eating if necessary   - Allow adequate time for meals  - Recommend/ encourage appropriate diets, oral nutritional supplements, and vitamin/mineral supplements  - Order, calculate, and assess calorie counts as needed  - Recommend, monitor, and adjust tube feedings and TPN/PPN based on assessed needs  - Assess need for intravenous fluids  - Provide specific nutrition/hydration education as appropriate  - Include patient/family/caregiver in decisions related to nutrition  Outcome: Adequate for Discharge     Problem: SKIN/TISSUE INTEGRITY - ADULT  Goal: Skin integrity remains intact  Description  INTERVENTIONS  - Identify patients at risk for skin breakdown  - Assess and monitor skin integrity  - Assess and monitor nutrition and hydration status  - Monitor labs (i e  albumin)  - Assess for incontinence   - Turn and reposition patient  - Assist with mobility/ambulation  - Relieve pressure over bony prominences  - Avoid friction and shearing  - Provide appropriate hygiene as needed including keeping skin clean and dry  - Evaluate need for skin moisturizer/barrier cream  - Collaborate with interdisciplinary team (i e  Nutrition, Rehabilitation, etc )   - Patient/family teaching  Outcome: Adequate for Discharge  Goal: Incision(s), wounds(s) or drain site(s) healing without S/S of infection  Description  INTERVENTIONS  - Assess and document risk factors for skin impairment   - Assess and document dressing, incision, wound bed, drain sites and surrounding tissue  - Consider nutrition services referral as needed  - Oral mucous membranes remain intact  - Provide patient/ family education  Outcome: Adequate for Discharge   Patient has been anxiously awaiting discharge to the [address] [place] [address] scheduled for 1400 this afternoon  Ate 100% of her breakfast and took her morning meds  Did not attend any of the offered groups  Has been preoccupied with her discharge, voicing multiple concerns r/t her medications being "the right ones" and whether or not she will be approved for oxygen after discharge  Remains anxious and worrisome despite multiple attempts by different staff members at reassuring her  She has been saying that everything "must be in order" or she "will not leave" as planned   Followed up with  in regards to patient's concerns and  reported that all issues with her medications have been resolved  Followed up with patient in regards to same, but patient remains paranoid and suspicious  Says " I still don't trust that things will be handled appropriately"  PRN medication was also offered but patient refused  No other behaviors or issues noted  Continue to monitor

## 2020-07-14 NOTE — DISCHARGE SUMMARY
Psychiatric Discharge Summary    Medical Record Number: [id number]  Encounter: [id number]    Discharge diagnosis:  Schizoaffective disorder, bipolar type ([facility] )    Secondary diagnoses:  Problem List     * (Principal) Schizoaffective disorder, bipolar type ([facility] ) (Chronic)    COPD with asthma ([facility] )    Tobacco use disorder, continuous    Compression fracture of L4 lumbar vertebra    Ventral hernia    Acute on chronic respiratory failure with hypoxia (HCC)    Acquired hypothyroidism    Gastroesophageal reflux disease without esophagitis    Abnormal CT of the chest    Excessive cerumen in left ear canal    Lipoma of right upper extremity    Noncompliant with deep vein thrombosis (DVT) prophylaxis    Low HDL (under 40)          HPI:     Jose Ramon Roblero is an 63 y o , female,  for 3 years  for 22 years with a 22-year-old son, on SSI benefits for about years, with high school equivalency diploma and unemployed since [address] [place] [address], living at the [address] [place] [address] for about 6 weeks and admitted to inpatient psychiatric unit on from May 5, 2019 initially as transfer from medical floor where she was treated for pneumonia  She was admitted to the extended acute care psychiatric unit at [address] [place] [address] Dr dominguez [facility] under 305 involuntary status from [facility] on 07/23/2019 as transfer from the older adult inpatient psychiatric unit at [address] [place] [address] Dr dominguez [facility] in [facility]    Relevant history  Patient is a poor informant and records indicate that she was at the older adult inpatient psychiatric unit at [facility] for about 3 months prior to being placed at the [address] [place] [address] and was supposed to be followed up by [facility] act team   Apparently she was refusing all medications including lithium and accused the staff of poisoning her medications with peanut oil and was claiming that she could not breathe and was unable to be managed in the community in the personal care home  She thought that 1 particular staff was trying to kill her and injecting things into her ears and claim that there was a electronic bugs implanted in her arm  When she was on the medical floor after being treated for pneumonia she refused to go back to [facility] as she became paranoid and hence the commitment to the older adult inpatient psychiatric unit  She was tried on various medications without relief as she became very paranoid and delusional and claimed that she did not feel safe and thought that people were out to harm her  She felt that people were tracking her and following her and wanting to kill her and she was not showing much improvement  She was on [place] which did not work according to her  She was tried on Haldol, lithium, Klonopin, Prozac, Prolixin, Risperdal etc and she claims that she was recently placed on the clozapine which she is now taking as 75 mg at bedtime with Prozac 10 mg a day which she claims has helped her to some extent  It was felt that she needed further long-term stabilization and hence the current transfer to the extended acute care unit  She insists that she was not in seclusion or restraints know why she put on suicide precautions when she was on the order of Older Adult Unit  She did not admit to any bouts of depression or dylan or other psychotic symptoms unless until I confronted her with the information from the chart and she was insisting that there were all false information  Past psychiatric history  Patient reports that she was in reform school when she was a teenager and was sent to [facility] when she was 17years old for about a month and a doctor took her off all medications and sent her back  Then she recalls being in [facility] as an adult at age 26 and at [facility] in [facility] and in [address] in [address] and she was actually at [facility] on a few occasions    She is very vague in giving details  She believes that she might have threatened suicide when she ended up in [facility] when she was 17years old  She categorically denied having any manic or depressive symptoms but she claims that before she ended up in [facility] she was going through postpartum depression and she recalls being placed on lithium at that time  It appears that she had been diagnosed with schizoaffective bipolar disorder because of possible manic symptoms as well as psychotic symptoms in the past   She appears to have had delusions about having an electronic Amarjit implanted on her left time which she misperceives  as she has a lipoma on her left forearm  She also believes that people were trying to implant  and trying to inject things into her ear to kill her and she thinnks that the people who killed her fiance years ago is still out to get her which appears to be an ongoing paranoid theme  She was tried on various medications as stated above  She denies having ever attempted suicide per se  She has had issues with self-care skills and was delusional about not being able to do anything even to walk a few paces without oxygen and was even refusing to go to the shower without oxygen even though she does not need nasal oxygen at all times    She claims that she did not have any hallucinatory experiences and she would not admit to any clear-cut manic episodes even though the referral material indicates that she might have presented with manic symptoms with agitation irritability and mood swings in the past   Medical History        Past Medical History:   Diagnosis Date    Acid reflux      Anxiety      Asthma      Bipolar 1 disorder (HCC)      Chronic pain disorder      Chronic respiratory failure (HCC)      Compression fracture of fourth lumbar vertebra (HCC)      COPD (chronic obstructive pulmonary disease) (HCC)      Depression      GERD (gastroesophageal reflux disease)      History of home oxygen therapy      Hypothyroidism      Lipoma of upper extremity      Psychiatric illness      Schizoaffective disorder (HCC)      Substance abuse (HCC)       Nicotine    Thoracic compression fracture (HCC)      Ventral hernia         She is 5 ft 4 in tall weighs about 146 lb  No seizures or head injuries reported  She claims to be allergic to medications as listed below  She has diagnosis of GERD act  Hypothyroidism lipoma openly extremity nicotine dependence and COPD and possibly asthma      Past surgical history:  Surgical History   No past surgical history on file       She claims she had a lung biopsy done in 2011 and that is when they put her on nasal oxygen  Family history:        Family History   Problem Relation Age of Onset    Arthritis Mother           Current medications:     Current Facility-Administered Medications:     acetaminophen (TYLENOL) tablet 650 mg, 650 mg, Oral, Q6H PRN, Allie Guzman MD    acetaminophen (TYLENOL) tablet 650 mg, 650 mg, Oral, Q6H PRN, Allie Guzman MD    acetaminophen (TYLENOL) tablet 650 mg, 650 mg, Oral, Q6H PRN, Allie Guzman MD    albuterol (PROVENTIL HFA,VENTOLIN HFA) inhaler 2 puff, 2 puff, Inhalation, Q4H PRN, Allie Guzman MD    benzonatate (TESSALON PERLES) capsule 100 mg, 100 mg, Oral, TID PRN, Allie Guzman MD    cloZAPine (CLOZARIL) tablet 75 mg, 75 mg, Oral, HS, Allie Guzman MD, 75 mg at 07/23/19 2057    FLUoxetine (PROzac) capsule 10 mg, 10 mg, Oral, Daily, Allie Guzman MD, 10 mg at 07/24/19 0852    fluticasone-vilanterol (BREO ELLIPTA) 200-25 MCG/INH inhaler 1 puff, 1 puff, Inhalation, Daily, Allie Guzman MD, 1 puff at 07/24/19 0852    levothyroxine tablet 125 mcg, 125 mcg, Oral, Early Morning, Allie Guzman MD, 125 mcg at 07/24/19 0534    OLANZapine (ZyPREXA) tablet 5 mg, 5 mg, Oral, Q8H PRN, Allie Guzman MD    pantoprazole (PROTONIX) EC tablet 40 mg, 40 mg, Oral, Early Morning, Allie Guzman MD, 40 mg at 07/24/19 0534    theophylline (JEF-24) 24 hr capsule 200 mg, 200 mg, Oral, Daily, Zac Sierra MD, 200 mg at 07/24/19 4078    traZODone (DESYREL) tablet 25 mg, 25 mg, Oral, HS PRN, Zac Sierra MD     Psychiatric Review Of Systems:  sleep: good  appetite changes: none  weight changes:none  energy/anergy: good  interest/pleasure/anhedonia: none  somatic symptoms: fear that she cannot do anything without nasal oxygen at times  anxiety/panic:yes  dylan: some hypomania present with slight irritability  guilty/hopeless: denies  self injurious behavior/risky behavior:none     Past Psychiatric History:      Currently in treatment with clozapine and prozac  Past Suicide attempts: none  Past Violent behavior: unknown  Past Psychiatric medication trial: multiple meds like haldol, prolixin, seroquel, invega, lithium  Past Psychiatric Hospitalizations: At least 4 or more as stated above     Allergies:         Allergies   Allergen Reactions    Peanut-Containing Drug Products Anaphylaxis    Shellfish-Derived Products Anaphylaxis    Antihistamines, Chlorpheniramine-Type      Aspirin      Atrovent [Ipratropium]      Elavil [Amitriptyline]      Iodine         Other reaction(s): Unknown Reaction    Levaquin [Levofloxacin]      Novocain [Procaine]      Penicillins         Able to tolerate azithromycin and vancomycin    Prolixin [Fluphenazine]      Sulfa Antibiotics Other (See Comments)       Unknown Zithromax-Tight Throat    Family history of mental illness denied by the patient     Social History:  Social History               Socioeconomic History    Marital status: Single       Spouse name: Not on file    Number of children: Not on file    Years of education: Not on file    Highest education level: Not on file   Occupational History    Not on file   Social Needs    Financial resource strain: Not on file    Food insecurity:       Worry: Not on file       Inability: Not on file    Transportation needs:       Medical: Not on file       Non-medical: Not on file   Tobacco Use    Smoking status: Current Every Day Smoker       Packs/day: 0 20       Types: Cigarettes    Smokeless tobacco: Never Used   Substance and Sexual Activity    Alcohol use: Never       Frequency: Never       Binge frequency: Never    Drug use: No    Sexual activity: Not Currently   Lifestyle    Physical activity:       Days per week: Not on file       Minutes per session: Not on file    Stress: Not on file   Relationships    Social connections:       Talks on phone: Not on file       Gets together: Not on file       Attends [employer] service: Not on file       Active member of club or organization: Not on file       Attends meetings of clubs or organizations: Not on file       Relationship status: Not on file    Intimate partner violence:       Fear of current or ex partner: Not on file       Emotionally abused: Not on file       Physically abused: Not on file       Forced sexual activity: Not on file   Other Topics Concern    Not on file   Social History Narrative    Not on file       She is the oldest out of 5 children with 3 sisters and a brother  She grew up in [facility] father was a  mother was a housewife  The siblings are about 2 years apart  She was a behavior problem when she was a teenager by running away from home not listening and being disruptive in class so that she was sent away to [facility] [employer] [facility] [employer] for a year or 2 from were she was committed to [facility] for running away and for threatening suicide when she was 15 according to her and then she was sent back there  She went up to 10th grade in regular classes and did get a high school work wants to diploma afterwards  She claims that she was  for 3 years but he was for 22 years  She has a 68-year-old son  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 and recalls being in [place] about 3 years 1 time  She claims that she was in [facility] group home for 3 years 1st time then lived on her own in [place] and then went back to [facility] again the 2nd time which did not work out and was then moved to [address] [place] [address] for less than a year when it closed and then she ended up in [facility] and personal care home in [place] from where she was transferred to [address] [place] [address] came home in [facility] before she ended up at the [facility] last early this year  She recalls being  once and then being with another boyfriend for a few years but has been single for many years    No history of incarceration reported      Trauma/ Abuse/ Neglect completely denied with the patient while growing up     Physical Examination:      Vital Signs:  Vitals          Vitals:     07/23/19 1700 07/23/19 1821 07/23/19 2237 07/24/19 0730   BP: 134/66 134/66   135/69   BP Location: Left arm Left arm   Left arm   Pulse: 78 78   85   Resp: 17 17   17   Temp: 98 6 °F (37 °C) 98 6 °F (37 °C)   (!) 97 2 °F (36 2 °C)   TempSrc: Temporal Temporal   Temporal   SpO2: 94% 95% 96%     Weight: 66 2 kg (146 lb) 66 2 kg (146 lb)       Height: 5' 4" (1 626 m) 5' 4" (1 626 m)              Mental status examination upon admission     Appearance:  casually dressed, older than stated age and overweight   Behavior:  deviant, evasive and guarded good eye contact well groomed and kept was found laying down on bed with nasal oxygen hooked on but was able to disconnect the nasal oxygen and then come out to meet with me in the team room for interview   Speech:  normal pitch and normal volume   Mood:  anxious and irritable   Affect:  mood-congruent mildly anxious   Thought Process:  circumstantial, logical and perserverative mildly pressured and becoming annoyed when trying to redirect   Thought Content:  delusions  persecutory bizarre delusions about having an electronic bugs implanted on her left forearm, believes that she is not quite safe on the unit but would not admit to any elaborate well systematized persecutory believes even though she made him differences that people who murdered her firyane may be out to get her, he no current suicidal homicidal thoughts intent or plans reported  No phobias obsessions compulsions elicited  She did not admit to any other delusional believes systems and denied all the delusions that she had verbalized upon her admission   Perceptual Disturbances: None and did not appear responding to any internal stimuli   Risk Potential: none but at potential for not able to care for herself because of recent history of refusal to take medications and care for herself   Sensorium:  person, place, time/date, situation, day of week, month of year, year and time   Cognition:  grossly intact her memory for recent , remote media recall are generally intact but she had difficulty recalling the 3rd out of 3 objects but with reminders she was able to pick the 3rd object  She would not do serial sevens from 100 or serial threes from 30 but with encouragement she could do serial 2s from 20  A problem-solving skills were intact  Her general knowledge was fairly intact  Consciousness:  alert and awake    Attention: Attention concentration span fairly intact   Intellect: Considered to be at least average   Insight:  limited   Judgment: limited      Motor Activity: no abnormal movements           Impression / Plan:   57-year-old  female with over 40 year history of psychiatric hospitalizations with possible manic as well as ongoing psychotic symptoms of persecutory delusions and bizarre delusions pointing towards diagnosis of  Schizoaffective bipolar type  Hospital course    Patient was continued current medications which were Prozac 10 mg a day for depression and clozapine 75 mg at bedtime for underlying psychosis and dylan    Since clozapine was recently started   she was easily morning monitored for weekly blood work and after 6 weeks every 2 weeks and the dose was increased to 150 mg a day and it could not be further increase because her QTC interval was around 450  She did not have any untoward side effects like agranulocytosis and closely watched and educated about risks side effects benefits and precautions  She was also incorporated in the therapeutic milieu on the extended acute care unit and provided with individual therapy, group therapy, milieu therapy and occupational therapy using recovery principles focusing on psychoeducation and the need to take control of her illness and staying in treatment  Later Zoloft was at in place of Prozac because of complaints of anxiety and depression  Will pattern was to lay around in bed and would not take showers 4 weeks or days but finally behavior plan was implemented and she was consistently taking showers at least once or twice a week and was attending groups at least 25% of the time  It was finally decided that she could be discharged to the personal care home  Initially were thinking about sending her to the [facility] but because of improvement we decided to send her back to the personal care home  Subsequently CSP meeting was held and represented is from the [facility] and the personal care home at the [facility] as well as team members attended and was decided to discharge her on June 29, 2020     A blood work was monitored along with EKGs and clozapine level and they were all basically within normal range except for baseline increasing QTC around 450  She was psychosomatic most of the time complaining about fear of death from shortness of breath or cough word 19 or from choking on food and she would periodically become suspicious of staff tampering with her oxygen concentrator and inhalant but was usually redirectable      Condition on discharge:   Mental Status Exam upon discharge  Appearance:                Somewhat anxious about discharge but better groomed but continues to look older than her stated age, with combed hair and  wearing clean clothes   Has good eye contact   Suspicious in her interaction  Behavior:                     Friendly pleasant but anxious and less suspicious and preoccupied  Speech:                       tangential at times with tendency to become stilted   Mood:                          anxious sad with tendency to be irritated at times,    Affect:                          increased in intensity range mood congruent redirectable and her affect was brighter at times  Thought Process:        Circumstantial with tendency to have stilted speech   Thought Content:        Still with some paranoia about motives of staff switching her medications or tampering with her inhalant or her oxygen concentrator   No current suicidal homicidal thoughts intent or plans reported  No phobias but has obsessions and compulsions about examining her pills before she takes them  Still gets occasional about dying by choking from food and from heart attack or from shortness of breath and now from catching Covid-19 which are all ongoing beliefs and now verbalizes she has "psychosomatic " sxs  She is receptive to verbal reassuarance and support and redirection    Perceptual Disturbances:  Claims to hear voices but cannot decipher other than from a lady but not commanding    Risk Potential:            Ability to care for herself and refusal to take showers  Sensorium:                  fully oriented to place, person, time, date, day, month, year and to situation  Cognition:                    Grossly intact, aware of current events like the corona virus situation, recent and remote memory grossly intact     No language deficit  Consciousness:          Alert and awake  Attention and concentration:  fair  Intellect:                       average  Insight:                         improving  Judgment:                   fair  Motor Activity:             No abnormal involuntary movement noted today  Improved  She was in no acute physical distress  Medications Upon Discharge:     No current facility-administered medications for this encounter       Current Outpatient Medications:     acetaminophen (TYLENOL) 325 mg tablet, Take 1 tablet (325 mg total) by mouth every 6 (six) hours as needed for mild pain, Disp: 30 tablet, Rfl: 0    albuterol (PROVENTIL HFA,VENTOLIN HFA) 90 mcg/act inhaler, Inhale 2 puffs every 4 (four) hours as needed for shortness of breath, Disp: 1 Inhaler, Rfl: 0    aluminum-magnesium hydroxide-simethicone (MYLANTA) 200-200-20 mg/5 mL suspension, Take 15 mL by mouth every 4 (four) hours as needed for indigestion or heartburn (dyspepsia), Disp: 355 mL, Rfl: 0    ammonium lactate (LAC-HYDRIN) 12 % lotion, Apply 1 application topically 2 (two) times a day as needed for dry skin, Disp: 400 g, Rfl: 0    benzonatate (TESSALON PERLES) 100 mg capsule, Take 1 capsule (100 mg total) by mouth 3 (three) times a day as needed for cough, Disp: 20 capsule, Rfl: 0    cloZAPine (CLOZARIL) 25 mg tablet, Take 1 tablet (25 mg total) by mouth 2 (two) times a day, Disp: 60 tablet, Rfl: 0    cloZAPine (CLOZARIL) 50 MG tablet, Take 1 tablet (50 mg total) by mouth 2 (two) times a day, Disp: 60 tablet, Rfl: 0    docusate sodium (COLACE) 100 mg capsule, Take 1 capsule (100 mg total) by mouth 2 (two) times a day as needed (constipation, firs line), Disp: 10 capsule, Rfl: 0    EPINEPHrine PF (ADRENALIN) 1 mg/mL, Inject 0 15 mL (0 15 mg total) into a muscle once as needed for anaphylaxis for up to 1 dose, Disp: 0 15 mL, Rfl: 0    fluticasone-vilanterol (BREO ELLIPTA) 200-25 MCG/INH inhaler, Inhale 1 puff daily Rinse mouth after use , Disp: 1 Inhaler, Rfl: 0    ketotifen (ZADITOR) 0 025 % ophthalmic solution, Administer 1 drop to the right eye 2 (two) times a day as needed (itching), Disp: 5 mL, Rfl: 0    levothyroxine 125 mcg tablet, Take 1 tablet (125 mcg total) by mouth daily in the early morning, Disp: 30 tablet, Rfl: 0    ondansetron (ZOFRAN-ODT) 4 mg disintegrating tablet, Take 1 tablet (4 mg total) by mouth every 6 (six) hours as needed for nausea or vomiting, Disp: 20 tablet, Rfl: 0    pantoprazole (PROTONIX) 40 mg tablet, Take 1 tablet (40 mg total) by mouth daily in the early morning, Disp: 30 tablet, Rfl: 0    polyethylene glycol (MIRALAX) 17 g packet, Take 17 g by mouth daily as needed (constipation), Disp: 14 each, Rfl: 0    polyvinyl alcohol (LIQUIFILM TEARS) 1 4 % ophthalmic solution, Administer 1 drop to both eyes every 3 (three) hours as needed for dry eyes (PRN patient comfort/request to affected eye), Disp: 15 mL, Rfl: 0    sertraline (ZOLOFT) 100 mg tablet, Take 2 tablets (200 mg total) by mouth daily, Disp: 60 tablet, Rfl: 0    sucralfate (CARAFATE) 1 g/10 mL suspension, Take 10 mL (1,000 mg total) by mouth 2 (two) times a day, Disp: 420 mL, Rfl: 0    theophylline (JEF-24) 200 MG 24 hr capsule, Take 1 capsule (200 mg total) by mouth daily, Disp: 30 capsule, Rfl: 0    tiotropium (SPIRIVA) 18 mcg inhalation capsule, Place 1 capsule (18 mcg total) into inhaler and inhale daily, Disp: 30 capsule, Rfl: 0  Psychiatric follow-up  To be arranged through [facility]  team who will also follow up with primary care physician for her multiple physical problems as well  Her CBC needs to be checked every 2 weeks and CMP lipid panel should be checked along with EKG every 3-6 months    She was educated about risks side effects benefits and potential side effects of medications and she was fully aware of the same and verbalized understanding and agreed to work with the act team psychiatrist  Amina Aparicio MD

## 2020-07-17 NOTE — TELEPHONE ENCOUNTER
Cancelling appointment for    7/17/20 8:00 AM 20 Leah Nash MD [[id number]] Jose Sandra [[id number]] HFU      Nurse will office to reschedule

## 2020-07-20 NOTE — ASSESSMENT & PLAN NOTE
6 minutes walk study was obtained today and desaturation was noted  Likely secondary to underlying COPD  Plan  Written prescription for supplemental oxygen with exertion  Previously was prescribed nocturnal oxygen as well  Strongly encouraged her to quit smoking  Continue COPD management  Will obtain echocardiogram for further evaluation as well rule out pulmonary vascular disease is contributing to etiology

## 2020-07-20 NOTE — ASSESSMENT & PLAN NOTE
Patient is a long-time smoker  I strongly encouraged to quit smoking  I offered to refer her to smoking cessation classes or prescribe medications  She states she is not interested at this time but she will think about it

## 2020-07-20 NOTE — ASSESSMENT & PLAN NOTE
Patient is a 24-year-old woman who was referred to the Pulmonary office for evaluation COPD  Patient is smoking tobacco since age of 17years old  Currently smokes 7 cigarettes per day  She was seen in the hospital in December 2019 at that time evaluated for 1 time episode of hemoptysis that has not recurred  Underwent chest imaging at that time that showed right lower lobe and right middle lobe reticular thickening  There was concern for potential aspiration and she underwent barium swallow study that was suboptimal   Patient denies any current episodes of aspiration  States she tries to eat her food in small bites  Today she is without any evidence of exacerbation  6 minutes walk study was obtained and she does require 2 L of supplemental oxygen with exertion  She has been compliant with her medications  Tolerating without any issues  Plan  Continue Spiriva, Breo and theophylline  Annual influenza vaccination when available  I strongly encouraged her to quit smoking

## 2020-07-20 NOTE — PROGRESS NOTES
Pulmonary Follow Up Note   Violetta Cage 63 y o  female MRN: [id number]  7/20/2020      Assessment:    COPD with asthma [facility])  Patient is a 81-year-old woman who was referred to the Pulmonary office for evaluation COPD  Patient is smoking tobacco since age of 17years old  Currently smokes 7 cigarettes per day  She was seen in the hospital in December 2019 at that time evaluated for 1 time episode of hemoptysis that has not recurred  Underwent chest imaging at that time that showed right lower lobe and right middle lobe reticular thickening  There was concern for potential aspiration and she underwent barium swallow study that was suboptimal   Patient denies any current episodes of aspiration  States she tries to eat her food in small bites  Today she is without any evidence of exacerbation  6 minutes walk study was obtained and she does require 2 L of supplemental oxygen with exertion  She has been compliant with her medications  Tolerating without any issues  Plan  Continue Spiriva, Breo and theophylline  Annual influenza vaccination when available  I strongly encouraged her to quit smoking  Chronic respiratory failure with hypoxia (HCC)  6 minutes walk study was obtained today and desaturation was noted  Likely secondary to underlying COPD  Plan  Written prescription for supplemental oxygen with exertion  Previously was prescribed nocturnal oxygen as well  Strongly encouraged her to quit smoking  Continue COPD management  Will obtain echocardiogram for further evaluation as well rule out pulmonary vascular disease is contributing to etiology  Tobacco use disorder, continuous  Patient is a long-time smoker  I strongly encouraged to quit smoking  I offered to refer her to smoking cessation classes or prescribe medications  She states she is not interested at this time but she will think about it        Plan:    Diagnoses and all orders for this visit:    Nocturnal hypoxia  -     POCT 6 minute walk    COPD with asthma ([facility] [address] )  -     fluticasone-vilanterol (BREO ELLIPTA) 200-25 MCG/INH inhaler; Inhale 1 puff daily Rinse mouth after use  -     theophylline (JEF-24) 200 MG 24 hr capsule; Take 1 capsule (200 mg total) by mouth daily  -     tiotropium (SPIRIVA) 18 mcg inhalation capsule; Place 1 capsule (18 mcg total) into inhaler and inhale daily  -     POCT 6 minute walk  -     Echo complete with contrast if indicated; Future  -     Home Oxygen with Portability    Hypoxia  -     Echo complete with contrast if indicated; Future  -     Home Oxygen with Portability    Tobacco use  -     Echo complete with contrast if indicated; Future        No follow-ups on file  History of Present Illness   HPI:  Dillon Butt is a 63 y o  female schizoaffective d/o, copd, current tobacco use, who presents for hospital followup  She was seen by Dr Ede Lawrence in 12/219 for a 1 time episode of hemoptysis  She has not a had a recurrence of hemoptysis  CXR showed opacification  F/u CT of the chest showed RML and RLL There was concern for possible aspiration  Barium swallow study was limited (she could not lie flat or swallow the barium)  She does not choke on her food  She smokes about 7 cigarettes per day  She has been smoking since she was 16-14 years old  She is not interested in quitting at this time  States smoking helps her to relax  She reports chronic exertional dyspnea  Does not have issues at rest  She denies having a cough  She does not wheeze  She states she uses her breo and spiriva daily  She is tolerating them without issues  She also takes 200 mg theophylline daily  She uses 1L of oxygen at night  She is not interested in performing pulmonary function tests  Review of Systems   Constitutional: Positive for chills  Negative for fatigue and fever  Chills in air conditioned buidling      HENT: Negative for congestion, nosebleeds, postnasal drip, rhinorrhea, sinus pressure and sore throat  Eyes: Negative for discharge, redness and itching  Cloudy vision- reports cataract of R eye  Respiratory: Positive for shortness of breath  Negative for cough, choking, chest tightness, wheezing and stridor  Cardiovascular: Negative for chest pain, palpitations and leg swelling  Gastrointestinal: Negative for blood in stool  Genitourinary: Negative for difficulty urinating and dysuria  Musculoskeletal: Negative for arthralgias, joint swelling and myalgias  Hand soreness, tightness   Skin: Negative for color change and rash  Neurological: Negative for light-headedness and headaches  Hematological: Negative for adenopathy  Historical Information   Past Medical History:   Diagnosis Date    Acid reflux     Allergic conjunctivitis of right eye 7/8/2019    Anxiety     Asthma     Bipolar 1 disorder ([facility])     Chronic pain disorder     Chronic respiratory failure ([facility])     Compression fracture of fourth lumbar vertebra ([facility])     COPD (chronic obstructive pulmonary disease) ([facility])     Depression     Elevated MCV 2/26/2016    GERD (gastroesophageal reflux disease)     History of home oxygen therapy     Hypomagnesemia 2/23/2016    Hypophosphatemia 2/24/2016    Hypothyroidism     Lactic acidosis 2/23/2016    Lipoma of upper extremity     Localized swelling of both lower legs 4/22/2019    Parapneumonic effusion 2/24/2016    Polydipsia 3/29/2019    Psychiatric illness     Schizoaffective disorder ([facility] [address] )     Sepsis ([facility] [address] ) 2/22/2016    Substance abuse ([facility] [address] )     Nicotine    Thoracic compression fracture ([facility])     Thoracic compression fracture ([facility] [address] ) 2/24/2016    Ventral hernia      No past surgical history on file    Family History   Problem Relation Age of Onset    Arthritis Mother          Meds/Allergies     Current Outpatient Medications:     acetaminophen (TYLENOL) 325 mg tablet, Take 1 tablet (325 mg total) by mouth every 6 (six) hours as needed for mild pain, Disp: 30 tablet, Rfl: 0    albuterol (PROVENTIL HFA,VENTOLIN HFA) 90 mcg/act inhaler, Inhale 2 puffs every 4 (four) hours as needed for shortness of breath, Disp: 1 Inhaler, Rfl: 0    aluminum-magnesium hydroxide-simethicone (MYLANTA) 200-200-20 mg/5 mL suspension, Take 15 mL by mouth every 4 (four) hours as needed for indigestion or heartburn (dyspepsia), Disp: 355 mL, Rfl: 0    ammonium lactate (LAC-HYDRIN) 12 % lotion, Apply 1 application topically 2 (two) times a day as needed for dry skin, Disp: 400 g, Rfl: 0    benzonatate (TESSALON PERLES) 100 mg capsule, Take 1 capsule (100 mg total) by mouth 3 (three) times a day as needed for cough, Disp: 20 capsule, Rfl: 0    cloZAPine (CLOZARIL) 25 mg tablet, Take 1 tablet (25 mg total) by mouth 2 (two) times a day, Disp: 60 tablet, Rfl: 0    cloZAPine (CLOZARIL) 50 MG tablet, Take 1 tablet (50 mg total) by mouth 2 (two) times a day, Disp: 60 tablet, Rfl: 0    EPINEPHrine PF (ADRENALIN) 1 mg/mL, Inject 0 15 mL (0 15 mg total) into a muscle once as needed for anaphylaxis for up to 1 dose, Disp: 0 15 mL, Rfl: 0    fluticasone-vilanterol (BREO ELLIPTA) 200-25 MCG/INH inhaler, Inhale 1 puff daily Rinse mouth after use , Disp: 1 Inhaler, Rfl: 3    ketotifen (ZADITOR) 0 025 % ophthalmic solution, Administer 1 drop to the right eye 2 (two) times a day as needed (itching), Disp: 5 mL, Rfl: 0    levothyroxine 125 mcg tablet, Take 1 tablet (125 mcg total) by mouth daily in the early morning, Disp: 30 tablet, Rfl: 0    ondansetron (ZOFRAN-ODT) 4 mg disintegrating tablet, Take 1 tablet (4 mg total) by mouth every 6 (six) hours as needed for nausea or vomiting, Disp: 20 tablet, Rfl: 0    pantoprazole (PROTONIX) 40 mg tablet, Take 1 tablet (40 mg total) by mouth daily in the early morning, Disp: 30 tablet, Rfl: 0    polyethylene glycol (MIRALAX) 17 g packet, Take 17 g by mouth daily as needed (constipation), Disp: 14 each, Rfl: 0    polyvinyl alcohol (LIQUIFILM TEARS) 1 4 % ophthalmic solution, Administer 1 drop to both eyes every 3 (three) hours as needed for dry eyes (PRN patient comfort/request to affected eye), Disp: 15 mL, Rfl: 0    sertraline (ZOLOFT) 100 mg tablet, Take 2 tablets (200 mg total) by mouth daily, Disp: 60 tablet, Rfl: 0    sucralfate (CARAFATE) 1 g/10 mL suspension, Take 10 mL (1,000 mg total) by mouth 2 (two) times a day, Disp: 420 mL, Rfl: 0    theophylline (JEF-24) 200 MG 24 hr capsule, Take 1 capsule (200 mg total) by mouth daily, Disp: 90 capsule, Rfl: 3    tiotropium (SPIRIVA) 18 mcg inhalation capsule, Place 1 capsule (18 mcg total) into inhaler and inhale daily, Disp: 90 capsule, Rfl: 0    docusate sodium (COLACE) 100 mg capsule, Take 1 capsule (100 mg total) by mouth 2 (two) times a day as needed (constipation, firs line) (Patient not taking: Reported on 7/20/2020), Disp: 10 capsule, Rfl: 0  Allergies   Allergen Reactions    Peanut-Containing Drug Products Anaphylaxis    Shellfish-Derived Products Anaphylaxis    Antihistamines, Chlorpheniramine-Type     Aspirin     Atrovent [Ipratropium]     Elavil [Amitriptyline]     Iodine      Other reaction(s): Unknown Reaction    Levaquin [Levofloxacin]     Novocain [Procaine]     Penicillins      Able to tolerate azithromycin and vancomycin    Prolixin [Fluphenazine]     Sulfa Antibiotics Other (See Comments)     Unknown Zithromax-Tight Throat    Zithromax [Azithromycin] Throat Swelling     Tight throat    Adhesive [Medical Tape] Rash     Skin irritation from adhesive on EKG leads when worn for an extended period  Vitals: Blood pressure 102/80, pulse 97, temperature 98 2 °F (36 8 °C), weight 64 kg (141 lb), SpO2 95 %, not currently breastfeeding  Body mass index is 24 2 kg/m²  Oxygen Therapy  SpO2: 95 %  Oxygen Therapy: None (Room air)      Physical Exam  Physical Exam   Constitutional: She is oriented to person, place, and time  She appears well-developed and well-nourished   No distress  HENT:   Head: Normocephalic and atraumatic  Right Ear: External ear normal    Left Ear: External ear normal    Nose: Nose normal    Mouth/Throat: No oropharyngeal exudate  Eyes: Pupils are equal, round, and reactive to light  Conjunctivae and EOM are normal    Neck: Normal range of motion  No tracheal deviation present  Cardiovascular: Normal rate, regular rhythm, normal heart sounds and intact distal pulses  Exam reveals no gallop and no friction rub  No murmur heard  Pulmonary/Chest: Effort normal and breath sounds normal  No stridor  She has no wheezes  She has no rales  Musculoskeletal: She exhibits no edema  Lymphadenopathy:        Head (right side): No submental, no submandibular, no preauricular and no posterior auricular adenopathy present  Head (left side): No submental, no submandibular, no preauricular and no posterior auricular adenopathy present  She has no cervical adenopathy  Neurological: She is alert and oriented to person, place, and time  Skin: Skin is warm and dry  Psychiatric: She has a normal mood and affect  Vitals reviewed  Labs: I have personally reviewed pertinent lab results  Lab Results   Component Value Date    WBC 9 10 07/14/2020    HGB 14 7 07/14/2020    HCT 44 0 07/14/2020    MCV 96 07/14/2020     07/14/2020     Lab Results   Component Value Date    GLUCOSE 85 05/18/2015    CALCIUM 9 2 04/20/2020     05/18/2015    K 3 9 04/20/2020    CO2 29 04/20/2020     04/20/2020    BUN 18 04/20/2020    CREATININE 0 69 04/20/2020     No results found for: IGE  Lab Results   Component Value Date    ALT 25 02/29/2020    AST 18 02/29/2020    ALKPHOS 116 02/29/2020    BILITOT 0 3 05/11/2015       Imaging and other studies: I have personally reviewed pertinent reports  IMPRESSION:     Right lower and middle lobe opacities as described with small right pleural effusion    This may represent pneumonia, aspiration etiology should be considered given presence of large hiatal hernia and persistence on prior x-rays  Pulmonary function testing:       EKG, Pathology, and Other Studies: I have personally reviewed pertinent reports  Holter 4/2020    Holter monitoring revealed predominant sinus rhythm with an average heart rate of 83 BPM, a minimum heart rate of 66 BPM, and a maximum heart rate of 140 BPM      There were about 332 (0 1%) ventricular ectopic beats, mostly occurring as single PVC's with no evidence of ventricular tachycardia      There were about 15 supraventricular ectopic beats, mostly occurring as single PAC's and late beats with no evidence of atrial fibrillation or atrial flutter      There was a very short run of atrial tachycardia lasting only 4 beats at 148 bpm      There was no evidence of significant bradyarrhythmia or advanced heart block  The longest R-R interval was 1 2 seconds    [facility]

## 2020-07-21 NOTE — TELEPHONE ENCOUNTER
[facility] called for an appointment for pt, I offered an in person appointment for next week  Antwonmakayla Davidson declined to accept the appointment at this time and stated they are so booked up they need to move things around and are not sure when they will be able to come   Did not want to book a future appointment at this time and is aware that next week may not be available if they call back at a later date

## 2020-08-03 NOTE — PLAN OF CARE
How can I help?   Problem: Alteration in Thoughts and Perception  Goal: Attend and participate in unit activities, including therapeutic, recreational, and educational groups  Description  Interventions:  - Provide therapeutic and educational activities daily, encourage attendance and participation, and document same in the medical record     CERTIFIED PEER SPECIALIST INTERVENTIONS:    Complete peer assessment with patient to assess their needs and identify their goals to complete while in the recovery program as well as once discharged into the community  Patient will complete WRAP Plan, Crisis Plan and 5 Life Domains  Patient will attend 50% of groups offered on the unit  Patient will complete a goal card weekly  Outcome: Progressing  Patient does attend select groups and will participate however, she remains inconsistent  Patient will stay in bed between groups and is currently at 41% group attendance

## 2020-08-19 NOTE — PROGRESS NOTES
Assessment/Plan:    COPD with asthma  - Continue follow-up with pulmonology as scheduled  - Continue Spiriva, Breo, theophylline, albuterol inhaler as needed  - Continue oxygen 1 L with sleeping and as needed  Tobacco use  - Currently smoking about 7 cigarettes a day  Strongly encouraged patient to continue working on smoking cessation  Schizoaffective  - Continue clozapine and sertraline as prescribed through Psychiatry  Continue follow-up with Psychiatry for therapy and medication management  Hypothyroidism  - Continue levothyroxine 125 mcg once daily       GERD/hiatal hernia  - Due to increase of frequency of symptoms, will increase Protonix 20 mg from once daily to twice daily         Diagnoses and all orders for this visit:    COPD with asthma ([facility] [address] )  -     albuterol (PROVENTIL HFA,VENTOLIN HFA) 90 mcg/act inhaler; Inhale 2 puffs every 4 (four) hours as needed for shortness of breath    Acquired hypothyroidism  -     levothyroxine 125 mcg tablet; Take 1 tablet (125 mcg total) by mouth daily in the early morning    GERD (gastroesophageal reflux disease)  -     pantoprazole (PROTONIX) 40 mg tablet; Take 1 tablet (40 mg total) by mouth 2 (two) times a day  -     ondansetron (ZOFRAN-ODT) 4 mg disintegrating tablet; Take 1 tablet (4 mg total) by mouth every 6 (six) hours as needed for nausea or vomiting  -     sucralfate (CARAFATE) 1 g/10 mL suspension; Take 10 mL (1,000 mg total) by mouth 2 (two) times a day    Anaphylaxis  -     EPINEPHrine PF (ADRENALIN) 1 mg/mL; Inject 0 15 mL (0 15 mg total) into a muscle once as needed for anaphylaxis for up to 1 dose    Elevated blood sugar  -     POCT hemoglobin A1c    Gastroesophageal reflux disease without esophagitis    Tobacco use disorder, continuous    Schizoaffective disorder, bipolar type ([facility] [address] )    Hiatal hernia          All of patients questions were answered  Patient understands and agrees with the above plan       Return in about 4 months (around 12/19/2020) for Next scheduled follow up COPD  Peewee Palafox PA-C  08/19/20  [facility]           Subjective:     Patient ID: Efraín Ruth  is a 63 y o  female with known PMH of COPD with asthma, schizoaffective disorder, hypothyroidism, GERD, hiatal hernia who presents today in office for follow-up of chronic conditions  Patient is accompanied today by her      - Patient is a 63 y o  female who presents today for follow-up of chronic conditions  COPD with asthma: Follows with pulmonology  Last seen 07/20/2020  Currently on Spiriva, Breo, theophylline, albuterol inhaler as needed  Patient notes she uses oxygen 1 L while sleeping and as needed  Tobacco use:  Currently smoking about 7 cigarettes a day  Patient notes she previously was smoking about 1 pack a day  Patient notes it is difficult to quit smoking because everyone in her house smokes      Schizoaffective: Currently taking clozapine and sertraline  Follows with Psychiatry for therapy and medication management  Patient was recently discharged from [address]  Patient was in patient from 07/23/2019 to 06/29/2020  Patient is now living at [facility]  Hypothyroidism: Currently on levothyroxine 125 mcg once daily       GERD/hiatal hernia: Currently taking Protonix for 20 mg once daily and Carafate twice daily  Patient did have EGD completed in October 2019 which revealed large hiatal hernia  Stomach and duodenum appeared normal   Patient notes throughout the day she does often have nausea and "feels full"  Patient notes that was happening intermittently, but now it is happening more frequently      - Of note, patient is requesting refill of EpiPen        The following portions of the patient's history were reviewed and updated as appropriate: allergies, current medications, past family history, past medical history, past social history, past surgical history and problem list         Review of Systems   Constitutional: Negative for fatigue and fever  HENT: Negative for congestion  Eyes: Negative for pain and visual disturbance  Respiratory: Positive for cough and shortness of breath  Negative for chest tightness  Cardiovascular: Negative for chest pain and palpitations  Gastrointestinal: Positive for nausea  Negative for abdominal pain, constipation, diarrhea and vomiting  Genitourinary: Negative for difficulty urinating and dysuria  Musculoskeletal: Negative for back pain  Skin: Negative for rash  Neurological: Negative for dizziness and headaches  Psychiatric/Behavioral: Negative for suicidal ideas  The patient is not nervous/anxious  Objective:   Vitals:    08/19/20 0840   BP: 118/80   BP Location: Right arm   Patient Position: Sitting   Cuff Size: Adult   Pulse: 88   Resp: 16   Temp: (!) 96 9 °F (36 1 °C)   TempSrc: Temporal   SpO2: 96%   Weight: 62 1 kg (137 lb)         Physical Exam  Vitals signs and nursing note reviewed  Constitutional:       General: She is not in acute distress  Appearance: She is well-developed  HENT:      Head: Normocephalic and atraumatic  Right Ear: External ear normal       Left Ear: External ear normal       Nose: Nose normal       Mouth/Throat:      Pharynx: Uvula midline  Eyes:      Conjunctiva/sclera: Conjunctivae normal    Neck:      Musculoskeletal: Normal range of motion and neck supple  Cardiovascular:      Rate and Rhythm: Normal rate and regular rhythm  Heart sounds: Normal heart sounds  No murmur  Pulmonary:      Effort: Pulmonary effort is normal       Breath sounds: Normal breath sounds  No wheezing  Skin:     General: Skin is warm and dry  Neurological:      Mental Status: She is alert and oriented to person, place, and time     Psychiatric:         Behavior: Behavior normal

## 2020-08-31 NOTE — PROGRESS NOTES
03/26/20 0800   Team Meeting   Meeting Type Daily Rounds   Team Members Present   Team Members Present Physician;Nurse;; Other (Discipline and Name)   Physician Team Member Dr Tyrese Garza, RN   Care Management Team Member Brenda Hemphill   Other (Discipline and Name) Anthony Colindres LCSW     Patient attended 3-11 shift groups only  Slept  Jyoti Morales  1949  AT RISK FOOT CARE    1  Onychomycosis     2  Foot callus     3  Type 2 diabetes mellitus with diabetic polyneuropathy, without long-term current use of insulin [facility])         Patient presents for at-risk foot care  Patient has no acute concerns today  Patient is seen routinely due to neuropathy, parasthesia, edema, and trophic skin changes to the lower extremity  Today's treatment includes:  Debridement of toenails  Using nail nipper, nadya, and curette, nails were sharply debrided, reduced in thickness and length  Devitalized tissue removed  Patient tolerated well  Lesion Destruction    Date/Time: 8/31/2020 9:13 AM  Performed by: Lottie Andrews DPM  Authorized by: Ltotie Andrews DPM     Procedure Details - Lesion Destruction:     Number of Lesions:  1  Lesion 1:     Body area:  Lower extremity    Lower extremity location:  L foot    Malignancy: benign hyperkeratotic lesion      Destruction method: scissors used for extraction            Discussed proper shoe gear, daily inspections of feet, and general foot health with patient  Patient has Q9  findings and is recommended for at risk foot care every 9-10 weeks

## 2020-09-27 NOTE — PLAN OF CARE
Past Medical History:   Diagnosis Date    Acute on chronic pancreatitis 4/3/2016    CHF (congestive heart failure)     DKA (diabetic ketoacidoses) 02/2020    DM (diabetes mellitus), type 1     HCV (hepatitis C virus)     high VL     HTN (hypertension)     Hypomagnesemia 5/25/2020    IVDU (intravenous drug user)     Heroin    Pancreatitis        Past Surgical History:   Procedure Laterality Date    CARDIAC SURGERY  1999    stent placed. ([facility])    ESOPHAGOGASTRODUODENOSCOPY N/A 3/5/2020    Gastritis.  No H pylori.  Completed PPI for 1 month.  Procedure: EGD (ESOPHAGOGASTRODUODENOSCOPY);  Surgeon: Brinda Rene MD;  Location: [facility];  Service: Endoscopy;  Laterality: N/A;  [id number]; [phone]    SHOULDER SURGERY  2013    right shoulder, spur removal.       Review of patient's allergies indicates:  No Known Allergies    No current facility-administered medications on file prior to encounter.      Current Outpatient Medications on File Prior to Encounter   Medication Sig    acetaminophen (TYLENOL) 650 MG TbSR Take 650 mg by mouth every 8 (eight) hours as needed.    albuterol-ipratropium (DUO-NEB) 2.5 mg-0.5 mg/3 mL nebulizer solution Take 3 mLs by nebulization every 4 (four) hours as needed for Wheezing or Shortness of Breath. Rescue    ASCORBIC ACID, VITAMIN C, ORAL Take 1 tablet by mouth once daily.    aspirin 81 MG Chew Take 1 tablet (81 mg total) by mouth once daily.    atorvastatin (LIPITOR) 20 MG tablet Take 20 mg by mouth once daily.    bisacodyL (DULCOLAX) 10 mg Supp Place 10 mg rectally daily as needed.    divalproex (DEPAKOTE) 125 MG EC tablet Take 125 mg by mouth every 8 (eight) hours.    docusate sodium (COLACE) 100 MG capsule Take 1 capsule (100 mg total) by mouth 2 (two) times daily as needed for Constipation.    DULoxetine (CYMBALTA) 60 MG capsule Take 1 capsule (60 mg total) by mouth once daily.    folic acid (FOLVITE) 800 MCG Tab Take 800 mcg by mouth once daily.     Problem: Alteration in Thoughts and Perception  Goal: Verbalize thoughts and feelings  Description  Interventions:  - Promote a nonjudgmental and trusting relationship with the patient through active listening and therapeutic communication  - Assess patient's level of functioning, behavior and potential for risk  - Engage patient in 1 on 1 interactions for a minimum of 15 minutes each session  - Encourage patient to express fears, feelings, frustrations, and discuss symptoms    - [place] patient to reality, help patient recognize reality-based thinking   - Administer medications as ordered and assess for potential side effects  - Provide the patient education related to the signs and symptoms of the illness and desired effects of prescribed medications  Outcome: Progressing  Goal: Agree to be compliant with medication regime, as prescribed and report medication side effects  Description  Interventions:  - Offer appropriate PRN medication and supervise ingestion; conduct aims, as needed   Outcome: Progressing     Problem: Depression  Goal: Refrain from isolation  Description  Interventions:  - Develop a trusting relationship   - Encourage socialization   Outcome: Progressing  Goal: Refrain from self-neglect  Outcome: Progressing     Problem: Alteration in Orientation  Goal: Interact with staff daily  Description  Interventions:  - Assess and re-assess patient's level of orientation  - Engage patient in 1 on 1 interactions, daily, for a minimum of 15 minutes   - Establish rapport/trust with patient   Outcome: Progressing     Problem: Alteration in Thoughts and Perception  Goal: Complete daily ADLs, including personal hygiene independently, as able  Description  Interventions:  - Observe, teach, and assist patient with ADLS  - Monitor and promote a balance of rest/activity, with adequate nutrition and elimination   Outcome: Not Progressing     Problem: Ineffective Coping  Goal: Participates in unit activities  Description  Interventions:  - Provide therapeutic environment   - Provide required programming   - Redirect inappropriate behaviors   Outcome: Not Progressing     Encouraged oral care, hygiene, ADL's, fluids and activity   Vital signs, stable, incentive spirometer every 4 hours and PRN while awake hitting 900mL, used once this shift after medical staff, encouraged her to comply, continues to complain of fatigue, refused to shower, set up assistance and stand by help offered, out of bed for meals and medications, gait steady, continues to yell out names of nursing staff members, to ask questions, request comfort measures and report frequent vague somatic complaints, "general malaise" seen today by medical staff, aware of pending Labs orders from Friday, still unable to collect, humidifier added to oxygen concentrator folic acid-vit B6-vit B12 2.5-25-2 mg (FOLBIC OR EQUIV) 2.5-25-2 mg Tab Take 1 tablet by mouth once daily.    gabapentin (NEURONTIN) 300 MG capsule Take 2 capsules (600 mg total) by mouth 3 (three) times daily.    HALOPERIDOL LACTATE INJ Inject 1 mg as directed every 4 (four) hours as needed.    hydroxyzine HCL (ATARAX) 25 MG tablet Take 1 tablet (25 mg total) by mouth 3 (three) times daily as needed for Anxiety.    insulin aspart U-100 (NOVOLOG) 100 unit/mL injection Inject 1-10 Units into the skin 3 (three) times daily with meals.    insulin lispro 100 unit/mL injection Inject 15 Units into the skin 3 (three) times daily before meals.    L. acidophilus/Strept/[place] (RISAQUAD ORAL) Take 1 capsule by mouth once daily.    melatonin (MELATIN) 3 mg tablet Take 2 tablets (6 mg total) by mouth nightly as needed for Insomnia.    multivitamin (THERAGRAN) per tablet Take 1 tablet by mouth once daily.    naloxone (NARCAN) 4 mg/actuation Spry 4mg by nasal route as needed for opioid overdose; may repeat every 2-3 minutes in alternating nostrils until medical help arrives. Call 911    nitroGLYCERIN (NITROSTAT) 0.4 MG SL tablet Place 1 tablet (0.4 mg total) under the tongue every 5 (five) minutes as needed for Chest pain.    ondansetron (ZOFRAN) 2 mg/mL injection Inject 4 mg into the vein every 6 (six) hours as needed.    QUEtiapine (SEROQUEL) 50 MG tablet Take 50 mg by mouth every evening.    traZODone (DESYREL) 50 MG tablet Take 25 mg by mouth every evening.    albuterol (PROVENTIL/VENTOLIN HFA) 90 mcg/actuation inhaler Inhale 1-2 puffs into the lungs every 6 (six) hours as needed for Wheezing or Shortness of Breath. Rescue    aspirin (ECOTRIN) 325 MG EC tablet Take 1 tablet (325 mg total) by mouth once daily.    b complex vitamins tablet Take 1 tablet by mouth once daily.    blood sugar diagnostic Strp Use To check blood glucose three times daily,    blood-glucose meter Misc To check Blood glucose three  times daily,    calcium carbonate (TUMS) 200 mg calcium (500 mg) chewable tablet Take 1 tablet (500 mg total) by mouth daily as needed for Heartburn.    diphenhydrAMINE (BENADRYL) 25 mg capsule Take 25 mg by mouth every 6 (six) hours as needed for Itching.    glucagon, human recombinant, (GLUCAGEN HYPOKIT) 1 mg SolR Inject 1 mg into the muscle as needed.    glucose 4 GM chewable tablet Take 16 g by mouth as needed for Low blood sugar.    insulin  unit/mL injection Inject 45 Units into the skin every evening.    lancets Misc To check BG 3 times daily, to use with insurance preferred meter    lidocaine (LIDODERM) 5 % Place 1 patch onto the skin once daily. Remove & Discard patch within 12 hours or as directed by MD.  Apply to shoulder for pain    lidocaine (LIDODERM) 5 % Place 1 patch onto the skin daily as needed. Remove & Discard patch within 12 hours or as directed by MD.  Apply to back for pain    mineral oil (FLEET OIL RETENTION) enema Place 1 enema rectally daily as needed for Constipation.    polyethylene glycol (GLYCOLAX) 17 gram PwPk Take 17 g by mouth daily as needed (Constipation). (Patient taking differently: Take 17 g by mouth 3 (three) times daily as needed (Constipation). )     Family History     Problem Relation (Age of Onset)    Asthma Mother        Tobacco Use    Smoking status: Current Every Day Smoker     Packs/day: 1.00     Years: 20.00     Pack years: 20.00     Types: Cigarettes     Start date: 4/3/1981     Last attempt to quit: 2020     Years since quittin.3    Smokeless tobacco: Never Used    Tobacco comment: states approx 1 per day.   Substance and Sexual Activity    Alcohol use: Not Currently     Alcohol/week: 0.0 standard drinks    Drug use: Not Currently     Types: Heroin     Comment: states hasn't done any in a while    Sexual activity: Yes     Partners: Female     Review of Systems   Cardiovascular: Positive for chest pain. Negative for dyspnea on exertion,  irregular heartbeat, leg swelling, near-syncope, orthopnea and paroxysmal nocturnal dyspnea.   Respiratory: Positive for shortness of breath. Negative for sputum production and wheezing.    Neurological: Positive for numbness. Negative for light-headedness and weakness.     Objective:     Vital Signs (Most Recent):  Temp: 98.1 °F (36.7 °C) (09/27/20 1233)  Pulse: 85 (09/27/20 1233)  Resp: 17 (09/27/20 1233)  BP: (!) 125/58 (09/27/20 1233)  SpO2: (!) 94 % (09/27/20 1233) Vital Signs (24h Range):  Temp:  [98.1 °F (36.7 °C)-98.2 °F (36.8 °C)] 98.1 °F (36.7 °C)  Pulse:  [] 85  Resp:  [16-24] 17  SpO2:  [94 %-100 %] 94 %  BP: (101-131)/(58-72) 125/58     Weight: 52.2 kg (115 lb)  Body mass index is 17.49 kg/m².    SpO2: (!) 94 %  O2 Device (Oxygen Therapy): room air    No intake or output data in the 24 hours ending 09/27/20 1327    Lines/Drains/Airways     None                 Physical Exam   Constitutional: He is oriented to person, place, and time. He appears cachectic. No distress.   Neck: No JVD present. Carotid bruit is not present.   Cardiovascular: Normal rate, regular rhythm and normal pulses.   Pulmonary/Chest: Effort normal and breath sounds normal.   Abdominal: Soft. Bowel sounds are normal. There is no abdominal tenderness.   Musculoskeletal:      Right lower leg: No edema.      Left lower leg: No edema.   Neurological: He is alert and oriented to person, place, and time.   Skin: He is not diaphoretic.   Psychiatric: He has a normal mood and affect. His speech is normal and behavior is normal.   Vitals reviewed.      Significant Labs:   CMP   Recent Labs   Lab 09/27/20  0907      K 4.4   CL 99   CO2 31*   *   BUN 14   CREATININE 1.2   CALCIUM 9.4   PROT 7.9   ALBUMIN 3.5   BILITOT 0.4   ALKPHOS 86   AST 18   ALT 30   ANIONGAP 8   ESTGFRAFRICA >60   EGFRNONAA >60   , CBC   Recent Labs   Lab 09/27/20  0907   WBC 6.73   HGB 12.2*   HCT 40.0      , Lipid Panel   Recent Labs   Lab  "09/27/20  0907   CHOL 129   HDL 41   LDLCALC 71.6   TRIG 82   CHOLHDL 31.8    and Troponin   Recent Labs   Lab 09/27/20  0907   TROPONINI 0.189*       Significant Imaging: Echocardiogram:   Transthoracic echo (TTE) complete ([facility] Only):   Results for orders placed or performed during the hospital encounter of 09/27/20   Echo Color Flow Doppler? Yes   Result Value Ref Range    BSA 1.58 m2    TDI SEPTAL 0.05 m/s    LV LATERAL E/E' RATIO 6.50 m/s    LV SEPTAL E/E' RATIO 10.40 m/s    AORTIC VALVE CUSP SEPERATION 2 cm    TDI LATERAL 0.08 m/s    PV PEAK VELOCITY 0.93 cm/s    LVIDd 3.20 3.5 - 6.0 cm    IVS 1.40 0.6 - 1.1 cm    Posterior Wall 1.40 0.6 - 1.1 cm    LVIDs 2.30 2.1 - 4.0 cm    FS 28 28 - 44 %    LV mass 153.05 g    LA size 2.20 cm    RVDD 3.00 cm    TAPSE 1.50 cm    RV S' 15 cm/s    Left Ventricle Relative Wall Thickness 0.88 cm    AV mean gradient 3 mmHg    AV valve area 2.13 cm2    AV Velocity Ratio 0.73     AV index (prosthetic) 0.75     E/A ratio 0.75     Mean e' 0.07 m/s    E wave decelartion time 219.00 msec    Pulm vein "A" wave 111.00 msec    Pulm vein S/D ratio 1.22     LVOT diameter 1.90 cm    LVOT area 2.8 cm2    LVOT peak brodie 0.8 m/s    LVOT peak VTI 16.20 cm    Ao peak brodie 1.1 m/s    Ao VTI 21.60 cm    LVOT stroke volume 45.91 cm3    AV peak gradient 5 mmHg    E/E' ratio 8.00 m/s    MV Peak E Brodie 0.52 m/s    TR Max Brodie 1.55 m/s    MV Peak A Brodie 0.69 m/s    PV Peak S Brodie 0.44 m/s    PV Peak D Brodie 0.36 m/s    LV Mass Index 95 g/m2    Echo EF Estimated 56 %    RA Major Axis 3.50 cm    Left Atrium Major Axis 3.80 cm    Triscuspid Valve Regurgitation Peak Gradient 10 mmHg    RA Width 3.50 cm    Right Atrial Pressure (from IVC) 3 mmHg    IVC diameter 1.241 cm    TV rest pulmonary artery pressure 13 mmHg    Sinus 2.90 cm    STJ 2.40 cm    Ascending aorta 3.10 cm    Narrative    · The left ventricle is normal in size with normal systolic function. The   estimated ejection fraction is 60%.  · There is left " ventricular concentric remodeling.  · Normal left ventricular diastolic function.  · Normal right ventricular systolic function.  · No pulmonary hypertension  · Normal central venous pressure (3 mmHg).  · The estimated PA systolic pressure is 13 mmHg.

## 2020-12-01 NOTE — PLAN OF CARE
Problem: Alteration in Thoughts and Perception  Goal: Verbalize thoughts and feelings  Description  Interventions:  - Promote a nonjudgmental and trusting relationship with the patient through active listening and therapeutic communication  - Assess patient's level of functioning, behavior and potential for risk  - Engage patient in 1 on 1 interactions for a minimum of 15 minutes each session  - Encourage patient to express fears, feelings, frustrations, and discuss symptoms    - [place] patient to reality, help patient recognize reality-based thinking   - Administer medications as ordered and assess for potential side effects  - Provide the patient education related to the signs and symptoms of the illness and desired effects of prescribed medications  Outcome: Progressing  Goal: Agree to be compliant with medication regime, as prescribed and report medication side effects  Description  Interventions:  - Offer appropriate PRN medication and supervise ingestion; conduct aims, as needed   Outcome: Progressing     Problem: Risk for Self Injury/Neglect  Goal: Verbalize thoughts and feelings  Description  Interventions:  - Assess and re-assess patient's lethality and potential for self-injury  - Engage patient in 1:1 interactions, daily, for a minimum of 15 minutes  - Encourage patient to express feelings, fears, frustrations, hopes  - Establish rapport/trust with patient   Outcome: Progressing   Patient in bed resting at start of shift  Upon approach, patient was easy to engage with   Discussed her recent dietary change and her feelings regarding it  Patient expressed some anxiety related to barium swallow test  Support given  Patient denied any unmet needs  Refused to attend group activities  Will monitor  Subjective   Michael Oconnor Sukhwinder Bautista MD;  Location: [facility] CATH INVASIVE LOCATION;  Service:    • CARDIAC CATHETERIZATION N/A 8/18/2017    Procedure: Coronary angiography;  Surgeon: Sukhwinder Bautista MD;  Location: [facility] CATH INVASIVE LOCATION;  Service:    • CARDIAC CATHETERIZATION N/A 8/18/2017    Procedure: Left ventriculography;  Surgeon: Sukhwinder Bautista MD;  Location: [facility] 

## 2020-12-21 NOTE — ASSESSMENT & PLAN NOTE
Psychiatry Progress Note    Subjective: Interval History     Patient is not attending any of the groups and prefers to lay back on bed excusing herself for being on nasal oxygen which she says been on for last 9 years and also has a portable unit for Oxygen as well  She refused to sign admission papers unless her sister is present and she was not able to be reached by staff to facilitate this despite attempt by CM and she says sister did visit with her over the wekend  She is still insisting that she cannot breathe or take showers on her own despite staff offering support and assistance even though pulse ox is acceptable for short periods  She is preoccupied with her breathing and appears to have some paranoia about the staff and she is again refusing to take the Atrovent claiming that there is "peanut oil in it  She has been sleeping well and has been using the nasal oxygen at night  She is compliant with medications and also refused blood work yesterday  No current suicidal homicidal thoughts intent or plans reported  She is casually dressed fairly groomed but is guarded suspicious evasive been in denial of her illness  No overt hallucinations reported other than some underlying paranoid delusions and beliefs that there is a micro chip implanted on her left forearm pointing to the 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  She did take a shower yesterday for the first time and she was encouraged to use the handicapped bathroom on the unit          Current medications:    Current Facility-Administered Medications:     acetaminophen (TYLENOL) tablet 650 mg, 650 mg, Oral, Q6H PRN, Faheem Daniels MD    acetaminophen (TYLENOL) tablet 650 mg, 650 mg, Oral, Q6H PRN, Faheem Daniels MD    acetaminophen (TYLENOL) tablet 650 mg, 650 mg, Oral, Q6H PRN, Faheem Daniels MD    albuterol (PROVENTIL HFA,VENTOLIN HFA) inhaler 2 puff, 2 puff, Inhalation, Q4H PRN, Faheem Daniels MD, 2 puff at 07/25/19 1200    benzonatate (TESSALON PERLES) capsule 100 mg, 100 mg, Oral, TID PRN, Krystyna Mcclain MD    cloZAPine (CLOZARIL) tablet 100 mg, 100 mg, Oral, HS, Krystyna Mcclain MD, 100 mg at 07/28/19 2034    FLUoxetine (PROzac) capsule 10 mg, 10 mg, Oral, Daily, Krystyna Mcclain MD, 10 mg at 07/29/19 0848    fluticasone-vilanterol (BREO ELLIPTA) 200-25 MCG/INH inhaler 1 puff, 1 puff, Inhalation, Daily, Krystyna Mcclain MD, 1 puff at 07/29/19 0848    levothyroxine tablet 125 mcg, 125 mcg, Oral, Early Morning, Krystyna Mcclain MD, 125 mcg at 07/29/19 0603    OLANZapine (ZyPREXA) tablet 5 mg, 5 mg, Oral, Q8H PRN, Krystyna Mcclain MD    pantoprazole (PROTONIX) EC tablet 40 mg, 40 mg, Oral, Early Morning, Krystyna Mcclain MD, 40 mg at 07/29/19 0603    theophylline (JEF-24) 24 hr capsule 200 mg, 200 mg, Oral, Daily, Krystyna Mcclain MD, 200 mg at 07/29/19 0848    traZODone (DESYREL) tablet 25 mg, 25 mg, Oral, HS PRN, Krystyna Mcclain MD  Justification if on more than two antipsychotics:  Only on his clozapine  Side effects if any:  None    Risks , benefits, side effects and precautions of medications discussed with patient and reminded patient to let us know any problems with medications     Objective:     Vital Signs:  Vitals:    07/28/19 0730 07/28/19 0732 07/29/19 0700 07/29/19 0705   BP: 118/72 106/67 126/73 109/64   BP Location: Left arm Left arm Left arm Left arm   Pulse: 102 (!) 107 102 (!) 107   Resp: 16  18 18   Temp: 97 6 °F (36 4 °C)  97 5 °F (36 4 °C)    TempSrc: Temporal  Temporal    SpO2:       Weight: 66 2 kg (146 lb)      Height:         Appearance:  age appropriate, casually dressed and overweight older than stated age   Behavior:  deviant, evasive and guarded and suspicious but with good eye contact   Speech:  normal pitch and normal volume but tends to become loud at times   Mood:  anxious and dysthymic   Affect:  mood-congruent   Thought Process:  goal directed and illogical slightly pressured but able to be redirected   Thought Content:  delusions  grandiose and somatic about not being able to breathe despite being on nasal oxygen and paranoid about people out to harm her and Atrovent inhaler tainteded with peanut oil  No current suicidal homicidal thoughts intent or plans reported  No phobias obsessions or compulsions reported   Perceptual Disturbances: None and does not appear responding to internal stimuli   Risk Potential: Tendency to not care for herself if she goes off treatment   Sensorium:  person, place, time/date, situation, day of week, month of year and time   Cognition:  grossly intact   Consciousness:  alert and awake    Attention: Intact concentration and attention span   Intellect: Considered to be at least of average intelligence   Insight:  limited in denial   Judgment: poor      Motor Activity: no abnormal movements         Recent Labs:  Results Reviewed     None          I/O Past 24 hours:  No intake/output data recorded  No intake/output data recorded  Assessment / Plan:     Schizoaffective disorder, bipolar [facility] ([facility] )      Reason for continued inpatient care:  Because of underlying paranoia and refusal to go back to the personal care home and inability to care for herself on her own  Acceptance by patient:  Accepting  Quinn Velásquez in recovery:  About living in another personal care home once she feels better  Understanding of medications :  Has some understanding  Involved in reintegration process:  Adjusting to the unit  Trusting in relatoinship with psychiatrist:  Trusting    Recommended Treatment:    Medication changes:  1)continue the  clozapine and blood work has been acceptable       Non-pharmacological treatments  1) start individual therapy group therapy, milieu therapy and occupational therapy and milieu therapy involving multidisciplinary team approach with psychotherapist, case management, nursing, peer support services, art therapist etc using recovery principles and psycho-education about accepting illness and need for treatment      Safety  1) Safety/communication plan established targeting dynamic risk factors above  Discharge Plan most likely back to the a:  Personal care boarding home with act services once her delusions are managed and mood becomes more stabilized and she becomes more receptive to treatment compliance    Counseling / Coordination of Care    Total floor / unit time spent today 15 minutes  Greater than 50% of total time was spent with the patient and / or family counseling and / or coordination of care  A description of the counseling / coordination of care  Patient's Rights, confidentiality and exceptions to confidentiality, use of automated medical record, [address] [place] [address] staff access to medical record, and consent to treatment reviewed      Vincent Olivares MD No

## 2021-01-12 NOTE — PLAN OF CARE
Problem: Alteration in Thoughts and Perception  Goal: Attend and participate in unit activities, including therapeutic, recreational, and educational groups  Description  Interventions:  - Provide therapeutic and educational activities daily, encourage attendance and participation, and document same in the medical record   Outcome: Progressing     Problem: Ineffective Coping  Goal: Understands least restrictive measures  Description  Interventions:  - Utilize least restrictive behavior  Outcome: Progressing     Problem: Anxiety  Goal: Anxiety is at manageable level  Description  Interventions:  - Assess and monitor patient's anxiety level  - Monitor for signs and symptoms of anxiety both physical and emotional (heart palpitations, chest pain, shortness of breath, headaches, nausea, feeling jumpy, restlessness, irritable, apprehensive)  - Collaborate with interdisciplinary team and initiate plan and interventions as ordered  - [facility] patient to unit/surroundings  - Explain treatment plan  - Encourage participation in care  - Encourage verbalization of concerns/fears  - Identify coping mechanisms  - Assist in developing anxiety-reducing skills  - Administer/offer alternative therapies  - Limit or eliminate stimulants  Outcome: Progressing     Problem: Alteration in Orientation  Goal: Interact with staff daily  Description  Interventions:  - Assess and re-assess patient's level of orientation  - Engage patient in 1 on 1 interactions, daily, for a minimum of 15 minutes   - Establish rapport/trust with patient   Outcome: Progressing     Problem: SAFETY ADULT  Goal: Patient will remain free of falls  Description  INTERVENTIONS:  - Assess patient frequently for physical needs  -  Identify cognitive and physical deficits and behaviors that affect risk of falls    -  [facility] fall precautions as indicated by assessment   - Educate patient/family on patient safety including physical limitations  - Instruct patient to [facility] Progress Note    Date: 2021    Name: Gabriel Salas    MRN: [id number]         : 1943     Tessy Ruddle Injection (Q 8 weeks)      Ms. Misti Tanner arrived ambulatory and in no acute distress, to [facility], at 80. Ms. Misti Tanner was assessed and education was provided. Patient denied any complaints of pain/discomfort, recent changes to her medication regimen or health condition. Ms. Thor Nuñez vitals were reviewed. Visit Vitals  /82 (BP 1 Location: Left arm, BP Patient Position: At rest;Sitting)   Pulse 97   Temp 98.5 °F (36.9 °C)   Resp 18   SpO2 97%   Breastfeeding No     Fasenra 30 mg inj., was administered SQ to posterior aspect of patient's left arm. Site without evidence of bleeding or swelling. Bandaid applied to injection site. Ms. Misti Tanner tolerated injection well and without complaints. Ms. Misti Tanner was discharged from [place] [id number] in stable condition at 1350. She is to return on 2021 at 1330 for her next appointment.     Carlee Miller, YECENIA  2021  1052 call for assistance with activity based on assessment  - Modify environment to reduce risk of injury  - Consider OT/PT consult to assist with strengthening/mobility  Outcome: Progressing     Problem: RESPIRATORY - ADULT  Goal: Achieves optimal ventilation and oxygenation  Description  INTERVENTIONS:  - Assess for changes in respiratory status  - Assess for changes in mentation and behavior  - Position to facilitate oxygenation and minimize respiratory effort  - Oxygen administration by appropriate delivery method based on oxygen saturation (per order) or ABGs  - Initiate smoking cessation education as indicated  - Encourage broncho-pulmonary hygiene including cough, deep breathe, Incentive Spirometry  - Assess the need for suctioning and aspirate as needed  - Assess and instruct to report SOB or any respiratory difficulty  - Respiratory Therapy support as indicated  Outcome: Mustapha Manjarrez has been in her room most of the shift  Social with staff and select peers when she does come out  Smiles and is pleasant/cooperative upon approach  Needed prompting to come out for medication prior to meal  Ate 100% of supper  Stated 5/10 anxiety and depression "due to circumstances " Did not require prn medication  Sleeps at intervals  No shower or BM this shift  Did not attend group this evening  Came out for snack and HS medication  Oxygen level was 93% at 2200 prior to Oxygen 1 liter being applied  Continue to monitor  Precautions maintained

## 2021-04-08 NOTE — PLAN OF CARE
Problem: Alteration in Thoughts and Perception  Goal: Agree to be compliant with medication regime, as prescribed and report medication side effects  Description  Interventions:  - Offer appropriate PRN medication and supervise ingestion; conduct aims, as needed   Outcome: Progressing  Goal: Attend and participate in unit activities, including therapeutic, recreational, and educational groups  Description  Interventions:  - Provide therapeutic and educational activities daily, encourage attendance and participation, and document same in the medical record     CERTIFIED PEER SPECIALIST INTERVENTIONS:    Complete peer assessment with patient to assess their needs and identify their goals to complete while in the recovery program as well as once discharged into the community  Patient will complete WRAP Plan, Crisis Plan and 5 Life Domains  Patient will attend 50% of groups offered on the unit  Patient will complete a goal card weekly  Outcome: Not Progressing  Goal: Complete daily ADLs, including personal hygiene independently, as able  Description  Interventions:  - Observe, teach, and assist patient with ADLS  - Monitor and promote a balance of rest/activity, with adequate nutrition and elimination   Outcome: Not Progressing     Problem: Depression  Goal: Refrain from isolation  Description  Interventions:  - Develop a trusting relationship   - Encourage socialization   Outcome: Not Progressing     Corey Marroquin remains isolative to her room, only coming out for meals  For breakfast she ate cream of wheat, orange juice and milk, for lunch she ate applesauce and milk  No somatic complaints this shift, but she did not attend groups due to her feeling fatigued  Compliant with her medications and pleasant during interactions but unaccepting of staff encouragement regarding her caloric intake and hygiene  Currently remains in her room  Will continue to monitor  Scribe Attestation (For Scribes USE Only)... I have personally evaluated and examined the patient. The Attending was available to me as a supervising provider if needed./Scribe Attestation (For Scribes USE Only)...

## 2021-07-12 NOTE — ASSESSMENT & PLAN NOTE
Controlled  Last TSH 2/29/2020: 2 23    -Continue with Levothyroxine 125 mcg daily Dr. Ysabel Flores  Pager [phone]    PROGRESS NOTE:     Patient is a 60y old  Female who presents with a chief complaint of urosepsis (11 Jul 2021 15:59)      SUBJECTIVE / OVERNIGHT EVENTS: pt denies chest pain or sob  ADDITIONAL REVIEW OF SYSTEMS: breathing okay    MEDICATIONS  (STANDING):  dextrose 50% Injectable 25 Gram(s) IV Push once  FIRST- Mouthwash  BLM 10 milliLiter(s) Swish and Spit every 6 hours  insulin glargine Injectable (LANTUS) 18 Unit(s) SubCutaneous at bedtime  insulin lispro (ADMELOG) corrective regimen sliding scale   SubCutaneous three times a day before meals  insulin lispro (ADMELOG) corrective regimen sliding scale   SubCutaneous at bedtime  insulin lispro Injectable (ADMELOG) 4 Unit(s) SubCutaneous three times a day before meals  lidocaine 1% Injectable 20 milliLiter(s) Local Injection once  midodrine. 10 milliGRAM(s) Oral three times a day  nystatin    Suspension 655403 Unit(s) Oral every 6 hours  polyethylene glycol 3350 17 Gram(s) Oral daily  senna 2 Tablet(s) Oral at bedtime    MEDICATIONS  (PRN):  acetaminophen   Tablet .. 650 milliGRAM(s) Oral every 6 hours PRN Temp greater or equal to 38C (100.4F), Mild Pain (1 - 3), Moderate Pain (4 - 6)  magnesium hydroxide Suspension 30 milliLiter(s) Oral daily PRN Constipation  ondansetron    Tablet 4 milliGRAM(s) Oral every 8 hours PRN Nausea and/or Vomiting  oxyCODONE    IR 5 milliGRAM(s) Oral every 4 hours PRN Severe Pain (7 - 10)      CAPILLARY BLOOD GLUCOSE      POCT Blood Glucose.: 236 mg/dL (12 Jul 2021 08:40)  POCT Blood Glucose.: 200 mg/dL (11 Jul 2021 22:24)  POCT Blood Glucose.: 186 mg/dL (11 Jul 2021 17:41)  POCT Blood Glucose.: 136 mg/dL (11 Jul 2021 12:25)    I&O's Summary      PHYSICAL EXAM:  Vital Signs Last 24 Hrs  T(C): 36.9 (12 Jul 2021 06:47), Max: 37 (11 Jul 2021 22:03)  T(F): 98.4 (12 Jul 2021 06:47), Max: 98.6 (11 Jul 2021 22:03)  HR: 88 (12 Jul 2021 06:47) (88 - 97)  BP: 108/57 (12 Jul 2021 06:47) (103/59 - 110/55)  BP(mean): --  RR: 17 (12 Jul 2021 06:47) (16 - 17)  SpO2: 100% (12 Jul 2021 06:47) (99% - 100%)  CONSTITUTIONAL: NAD, cachectic , thin woman  EYES: EOMI, conjunctiva and sclera clear  ENMT: Moist oral mucosa  NECK: Supple  RESPIRATORY: Breathing unlabored, CTAB  CARDIOVASCULAR: S1S2 no MRG, 2-3 + pitting leg edema  ABDOMEN: distended,   : primafit   MUSCULOSKELETAL: no clubbing or cyanosis of digits  NEUROLOGY: No focal deficits   SKIN: No rashes or lesions    LABS:                        9.0    6.41  )-----------( 233      ( 12 Jul 2021 07:18 )             30.4     07-12    148<H>  |  110<H>  |  15  ----------------------------<  220<H>  3.5   |  20<L>  |  0.60    Ca    8.4      12 Jul 2021 07:18  Phos  2.5     07-12  Mg     1.90     07-12    TPro  6.1  /  Alb  2.2<L>  /  TBili  0.5  /  DBili  x   /  AST  16  /  ALT  11  /  AlkPhos  157<H>  07-12                RADIOLOGY & ADDITIONAL TESTS:  Results Reviewed:   Imaging Personally Reviewed:  rad< from: CT Abdomen and Pelvis w/wo IV Cont (07.10.21 @ 16:30) >    Metastatic endometrial cancer with extensive omental and peritoneal disease and sclerotic osseous metastatic disease. Stable to mild interval increase in the osseous metastatic disease since 6/7/2021. Moderate to large volume malignant ascites is essentially unchanged.    No retroperitoneal hematoma. No CT evidence of active GI bleed.    Right renal pelvic fullness versus mild hydronephrosis, new/increased since 6/7/2021.      Electrocardiogram Personally Reviewed:    COORDINATION OF CARE:  Care Discussed with Consultants/Other Providers [Y/N]: sanjay Castellano therapeutic paracentesis  Prior or Outpatient Records Reviewed [Y/N]:

## 2021-08-04 NOTE — TREATMENT TEAM
05/26/20 0900   Activity/Group Checklist   Group Community meeting   Attendance Did not attend   Attendance Duration (min) 31-45   Affect/Mood IRMA Graft Donor Site Bandage (Optional-Leave Blank If You Don't Want In Note): Steri-strips and a pressure bandage were applied to the donor site.

## 2021-09-24 NOTE — PLAN OF CARE
Addended by: DENZEL MEDINA on: 9/24/2021 02:18 PM     Modules accepted: Orders     Problem: Alteration in Thoughts and Perception  Goal: Treatment Goal: Gain control of psychotic behaviors/thinking, reduce/eliminate presenting symptoms and demonstrate improved reality functioning upon discharge  Outcome: Progressing  Goal: Verbalize thoughts and feelings  Description  Interventions:  - Promote a nonjudgmental and trusting relationship with the patient through active listening and therapeutic communication  - Assess patient's level of functioning, behavior and potential for risk  - Engage patient in 1 on 1 interactions for a minimum of 15 minutes each session  - Encourage patient to express fears, feelings, frustrations, and discuss symptoms    - [place] patient to reality, help patient recognize reality-based thinking   - Administer medications as ordered and assess for potential side effects  - Provide the patient education related to the signs and symptoms of the illness and desired effects of prescribed medications  Outcome: Progressing  Goal: Refrain from acting on delusional thinking/internal stimuli  Description  Interventions:  - Monitor patient closely, per order   - Utilize least restrictive measures   - Set reasonable limits, give positive feedback for acceptable   - Administer medications as ordered and monitor of potential side effects  Outcome: Progressing  Goal: Agree to be compliant with medication regime, as prescribed and report medication side effects  Description  Interventions:  - Offer appropriate PRN medication and supervise ingestion; conduct aims, as needed   Outcome: Progressing  Goal: Attend and participate in unit activities, including therapeutic, recreational, and educational groups  Description  Interventions:  - Provide therapeutic and educational activities daily, encourage attendance and participation, and document same in the medical record   Outcome: Progressing  Goal: Recognize dysfunctional thoughts, communicate reality-based thoughts at the time of discharge  Description  Interventions:  - Provide medication and psycho-education to assist patient in compliance and developing insight into his/her illness   Outcome: Progressing  Goal: Complete daily ADLs, including personal hygiene independently, as able  Description  Interventions:  - Observe, teach, and assist patient with ADLS  - Monitor and promote a balance of rest/activity, with adequate nutrition and elimination   Outcome: Progressing     Problem: Ineffective Coping  Goal: Identifies ineffective coping skills  Outcome: Progressing  Goal: Identifies healthy coping skills  Outcome: Progressing  Goal: Demonstrates healthy coping skills  Outcome: Progressing  Goal: Participates in unit activities  Description  Interventions:  - Provide therapeutic environment   - Provide required programming   - Redirect inappropriate behaviors   Outcome: Progressing  Goal: Patient/Family participate in treatment and DC plans  Description  Interventions:  - Provide therapeutic environment  Outcome: Progressing  Goal: Patient/Family verbalizes awareness of resources  Outcome: Progressing  Goal: Understands least restrictive measures  Description  Interventions:  - Utilize least restrictive behavior  Outcome: Progressing  Goal: Free from restraint events  Description  - Utilize least restrictive measures   - Provide behavioral interventions   - Redirect inappropriate behaviors   Outcome: Progressing     Problem: Risk for Self Injury/Neglect  Goal: Treatment Goal: Remain safe during length of stay, learn and adopt new coping skills, and be free of self-injurious ideation, impulses and acts at the time of discharge  Outcome: Progressing  Goal: Verbalize thoughts and feelings  Description  Interventions:  - Assess and re-assess patient's lethality and potential for self-injury  - Engage patient in 1:1 interactions, daily, for a minimum of 15 minutes  - Encourage patient to express feelings, fears, frustrations, hopes  - Establish rapport/trust with patient   Outcome: Progressing  Goal: Refrain from harming self  Description  Interventions:  - Monitor patient closely, per order  - Develop a trusting relationship  - Supervise medication ingestion, monitor effects and side effects   Outcome: Progressing  Goal: Attend and participate in unit activities, including therapeutic, recreational, and educational groups  Description  Interventions:  - Provide therapeutic and educational activities daily, encourage attendance and participation, and document same in the medical record  - Obtain collateral information, encourage visitation and family involvement in care   Outcome: Progressing  Goal: Recognize maladaptive responses and adopt new coping mechanisms  Outcome: Progressing  Goal: Complete daily ADLs, including personal hygiene independently, as able  Description  Interventions:  - Observe, teach, and assist patient with ADLS  - Monitor and promote a balance of rest/activity, with adequate nutrition and elimination  Outcome: Progressing     Problem: Depression  Goal: Treatment Goal: Demonstrate behavioral control of depressive symptoms, verbalize feelings of improved mood/affect, and adopt new coping skills prior to discharge  Outcome: Progressing  Goal: Verbalize thoughts and feelings  Description  Interventions:  - Assess and re-assess patient's level of risk   - Engage patient in 1:1 interactions, daily, for a minimum of 15 minutes   - Encourage patient to express feelings, fears, frustrations, hopes   Outcome: Progressing  Goal: Refrain from harming self  Description  Interventions:  - Monitor patient closely, per order   - Supervise medication ingestion, monitor effects and side effects   Outcome: Progressing  Goal: Refrain from isolation  Description  Interventions:  - Develop a trusting relationship   - Encourage socialization   Outcome: Progressing  Goal: Refrain from self-neglect  Outcome: Progressing  Goal: Attend and participate in unit activities, including therapeutic, recreational, and educational groups  Description  Interventions:  - Provide therapeutic and educational activities daily, encourage attendance and participation, and document same in the medical record   Outcome: Progressing  Goal: Complete daily ADLs, including personal hygiene independently, as able  Description  Interventions:  - Observe, teach, and assist patient with ADLS  -  Monitor and promote a balance of rest/activity, with adequate nutrition and elimination   Outcome: Progressing     Problem: Anxiety  Goal: Anxiety is at manageable level  Description  Interventions:  - Assess and monitor patient's anxiety level  - Monitor for signs and symptoms of anxiety both physical and emotional (heart palpitations, chest pain, shortness of breath, headaches, nausea, feeling jumpy, restlessness, irritable, apprehensive)  - Collaborate with interdisciplinary team and initiate plan and interventions as ordered    - [place] patient to unit/surroundings  - Explain treatment plan  - Encourage participation in care  - Encourage verbalization of concerns/fears  - Identify coping mechanisms  - Assist in developing anxiety-reducing skills  - Administer/offer alternative therapies  - Limit or eliminate stimulants  Outcome: Progressing     Problem: DISCHARGE PLANNING - CARE MANAGEMENT  Goal: Discharge to post-acute care or home with appropriate resources  Description  INTERVENTIONS:  - Conduct assessment to determine patient/family and health care team treatment goals, and need for post-acute services based on payer coverage, community resources, and patient preferences, and barriers to discharge  - Address psychosocial, clinical, and financial barriers to discharge as identified in assessment in conjunction with the patient/family and health care team  - Arrange appropriate level of post-acute services according to patients   needs and preference and payer coverage in collaboration with the physician and health care team  - Communicate with and update the patient/family, physician, and health care team regarding progress on the discharge plan  - Arrange appropriate transportation to post-acute venues  Outcome: Progressing     Problem: Prexisting or High Potential for Compromised Skin Integrity  Goal: Skin integrity is maintained or improved  Description  INTERVENTIONS:  - Identify patients at risk for skin breakdown  - Assess and monitor skin integrity  - Assess and monitor nutrition and hydration status  - Monitor labs (i e  albumin)  - Assess for incontinence   - Turn and reposition patient  - Assist with mobility/ambulation  - Relieve pressure over bony prominences  - Avoid friction and shearing  - Provide appropriate hygiene as needed including keeping skin clean and dry  - Evaluate need for skin moisturizer/barrier cream  - Collaborate with interdisciplinary team (i e  Nutrition, Rehabilitation, etc )   - Patient/family teaching  Outcome: Progressing

## 2021-10-11 NOTE — PLAN OF CARE
Problem: Alteration in Thoughts and Perception  Goal: Agree to be compliant with medication regime, as prescribed and report medication side effects  Description  Interventions:  - Offer appropriate PRN medication and supervise ingestion; conduct aims, as needed   Outcome: Progressing     Problem: RESPIRATORY - ADULT  Goal: Achieves optimal ventilation and oxygenation  Description  INTERVENTIONS:  - Assess for changes in respiratory status  - Assess for changes in mentation and behavior  - Position to facilitate oxygenation and minimize respiratory effort  - Oxygen administration by appropriate delivery method based on oxygen saturation (per order) or ABGs  - Initiate smoking cessation education as indicated  - Encourage broncho-pulmonary hygiene including cough, deep breathe, Incentive Spirometry  - Assess the need for suctioning and aspirate as needed  - Assess and instruct to report SOB or any respiratory difficulty  - Respiratory Therapy support as indicated  Outcome: Progressing     Problem: Alteration in Thoughts and Perception  Goal: Attend and participate in unit activities, including therapeutic, recreational, and educational groups  Description  Interventions:  - Provide therapeutic and educational activities daily, encourage attendance and participation, and document same in the medical record   Outcome: Not Progressing  Goal: Complete daily ADLs, including personal hygiene independently, as able  Description  Interventions:  - Observe, teach, and assist patient with ADLS  - Monitor and promote a balance of rest/activity, with adequate nutrition and elimination   Outcome: Not Progressing     Problem: Ineffective Coping  Goal: Demonstrates healthy coping skills  Outcome: Not Progressing     Problem: Depression  Goal: Refrain from isolation  Description  Interventions:  - Develop a trusting relationship   - Encourage socialization   Outcome: Not Progressing     [address]um was concerned about bloody Referred by: Michael D D'Amico, MD; Medical Diagnosis (from order):    Diagnosis Information      Diagnosis    723.1 (ICD-9-CM) - M54.2 (ICD-10-CM) - Neck pain, bilateral            Therapy Benefits  Payor: Medicaid - [facility] ([facility])  Authorization Needed: No  Maximum Visit Limit Per Year: Based on medical necessity        Visit:  4     SUBJECTIVE                                                                                                               \"I feel it on both sides of my neck, but the pain is not as bad in my face as it was.  Sleeping is better, but I still wake up every few hours.\"  Reports pain a 5/10 at present.   Sitting tolerance while using computer limited to about 10 minutes.    Functional Change: Sleeping is better, but I still wake up every few hours. Sitting tolerance while using computer limited to about 10 minutes.    Pain / Symptoms:  Pain/symptom is: constant  Pain rating (out of 10): Current: 5 ; Best: 5; Worst: 9  Location: R upper trapezius, R levator scapula, R side of face.    Quality / Description: tight, stiff. Heavy  Alleviating Factors: avoiding movement in involved area, massage.   Progression since onset: improved    OBJECTIVE                                                                                                                     Range of Motion (ROM)   (degrees unless noted; active unless noted; norms in ( ); negative=lacking to 0, positive=beyond 0)   Cervical:    - Rotation (60-80):        • Left: 72°        • Right: 67°    - Side Bend (45):        • Left: 32°        • Right: 47°      TREATMENT                                                                                                                  Therapeutic Exercise:  Reviewed the following exercises which were performed in clinic and issued for home exercise program:   Seated shoulder blade squeezes  Sidelying scapular PNF clocks AAROM  Supine Cervical Sidebending Stretch - 2 x  daily - 7 x weekly - 2 reps - 30 hold    Instructed in the following exercises which were performed in clinic and issued for home exercise program:   Doorway Pec Stretch at 90 Degrees Abduction - 1-3 x daily - 7 x weekly - 1-2 reps - 15 hold      Manual Therapy:  Soft tissue mobilization to B upper trapezius, B levator scapula, R scalenes in supine.  Decreased tissue tension noted as compared to last visit.  Suboccipital release in supine      Therapeutic Activity:  Reviewed the following this date:  Proper sitting posture with feet flat on floor, hips positioned in neutral alignment, neutral spine with use of ischial towel wedge to support and maintain  Proper sleeping posture in sidelying with pillow between knees to maintain hips and lower back in neutral alignment.     Instructed in the following:  Seated posture with PVC for feedback and positioning in neutral spine.  Hip hinge in sitting for proper alignment when working on computer.    Sit to stand transition with neutral spine using PVC.  Improved technique, posture following instruction with decreased pain reported.    Skilled input: verbal instruction/cues, tactile instruction/cues and posture correction    Writer verbally educated and received verbal consent for hand placement, positioning of patient, and techniques to be performed today from patient for clothing adjustments for techniques, therapist position for techniques and hand placement and palpation for techniques as described above and how they are pertinent to the patient's plan of care.    Home Exercise Program/Education Materials: Shoulder blade squeezes  Scapular AAROM clocks in sidelying  Supine Cervical Sidebending Stretch - 2 x daily - 7 x weekly - 2 reps - 30 hold  Doorway Pec Stretch at 90 Degrees Abduction - 1-3 x daily - 7 x weekly - 1-2 reps - 15 hold      Cryotherapy (55025): Cold Pack  Location: B cervical paraspinals  Position: supine with leg(s) elevated  Duration: 10  sputum she saved in cup  Relates that is was collected from three different coughing episodes today  [facility] resident Dr Aster Fairbanks was contacted by this nurse  She & Dr Merline Fierro visited Agustin Edgar, saw the sputum, examined her  Plan is to continue to monitor VS, notify them if any further bloody sputum, also, encourage fluids as BP some low tonight, reflecting her poorer hydration  She has had two cups of 480ml water this shift  Maggie ate 50% of her meal  She has been unwilling to do any extra [place] laps, refused oral care or to comb out her hair/re-braid  "Too tired " Has done the Incentive Spirometry Q1H WA, reaching volumes of 750-900, 5-6 breathes each time  Has come up for scheduled medicine when prompted  Was visited by her sister w/warm interactions  She is pleasant, but, isolates in bed  Did have an HS snack  Is wearing her HS nasal O2 @ 1L for bed after pre-application PO2 of 99%  No groups attended  minutes  Results: decreased pain  Reaction: no adverse reaction to treatment      ASSESSMENT                                                                                                             \"It's a little sore but that ice helped.  I don't feel it in my face now.\"   Good carryover of posture re-ed in sitting and with sit to stand transition.  Continue to progress cervical, scapular stability as able.  Pain/symptoms after session (out of 10): 3    Patient Education:   Results of above outlined education: Verbalizes understanding, Demonstrates understanding and Needs reinforcement      PLAN                                                                                                                           Suggestions for next session as indicated: Progress per plan of care.  Assess response to manual therapy, modalities.  Progress cervical flexibility, stability, posture re-ed.         GOALS                                                                                                                           Long Term Goals: to be met by end of plan of care  1. Patient will demonstrate 15 degree increase in B cervical rotation to allow patient to safely check blindspot while driving.  2. Patient will sleep 6 hours without disruption from pain/difficulty with positional changes.   3. Patient will bend/squat/reach with reported manageable/tolerable pain for completion of household tasks such as cleaning, putting away groceries  4. Patient will be independent with progressed and modified home exercise program.  5. Neck Disability Index: Patient will complete form to reflect an improved score to less than or equal to 24% to indicate patient reported improvement in function/disability/impairment (minimal detectable change: 21%).  Patient 48% at evaluation.      Therapy procedure time and total treatment time can be found documented on the Time Entry flowsheet

## 2021-11-21 NOTE — PROGRESS NOTES
12/20/19 1100   Activity/Group Checklist   Group Other (Comment)  ([facility] - Holistic Wellness/Nutrition & Exercise/Goal Review)   Attendance Attended   Attendance Duration (min) 46-60   Interactions Interacted appropriately   Affect/Mood Appropriate   Goals Achieved Discussed coping strategies; Able to listen to others; Able to engage in interactions; Able to self-disclose; Able to recieve feedback; Able to give feedback to another;Able to reflect/comment on own behavior     Patient attended [facility] focused on holistic wellness with an emphasis on nutrition/exercise, in addition to weekly goal review  Patient did not bring a goal card, but reported that she tried to attend two groups per day in the last week and "take showers "  Patient would not give any timely specifiers to her achievement of showering  Patient was engaged throughout the group, participated, and was respectful of peers  Here are the  biopsy/pathology findings from your recent Colonoscopy : The colon polyps were benign adenomatous polyps. Follow-up information:  Follow up colonoscopy in 3 years.      If you need any further assistance , please feel free to call [phone]

## 2021-12-09 NOTE — PLAN OF CARE
Pt less irritable today  She attended morning groups with less need for redirection and rested in her bed post lunch  No

## 2022-02-09 NOTE — PLAN OF CARE
Problem: Alteration in Thoughts and Perception  Goal: Verbalize thoughts and feelings  Description  Interventions:  - Promote a nonjudgmental and trusting relationship with the patient through active listening and therapeutic communication  - Assess patient's level of functioning, behavior and potential for risk  - Engage patient in 1 on 1 interactions for a minimum of 15 minutes each session  - Encourage patient to express fears, feelings, frustrations, and discuss symptoms    - [place] patient to reality, help patient recognize reality-based thinking   - Administer medications as ordered and assess for potential side effects  - Provide the patient education related to the signs and symptoms of the illness and desired effects of prescribed medications  Outcome: Progressing  Goal: Agree to be compliant with medication regime, as prescribed and report medication side effects  Description  Interventions:  - Offer appropriate PRN medication and supervise ingestion; conduct aims, as needed   Outcome: Progressing  Goal: Attend and participate in unit activities, including therapeutic, recreational, and educational groups  Description  Interventions:  - Provide therapeutic and educational activities daily, encourage attendance and participation, and document same in the medical record     CERTIFIED PEER SPECIALIST INTERVENTIONS:    Complete peer assessment with patient to assess their needs and identify their goals to complete while in the recovery program as well as once discharged into the community  Patient will complete WRAP Plan, Crisis Plan and [employer] Domains  Patient will attend 50% of groups offered on the unit  Patient will complete a goal card weekly      Outcome: Progressing     Problem: Ineffective Coping  Goal: Participates in unit activities  Description  Interventions:  - Provide therapeutic environment   - Provide required programming   - Redirect inappropriate behaviors   Outcome: 100 Progressing  Goal: Patient/Family verbalizes awareness of resources  Outcome: Progressing  Goal: Understands least restrictive measures  Description  Interventions:  - Utilize least restrictive behavior  Outcome: Progressing     Problem: Risk for Self Injury/Neglect  Goal: Treatment Goal: Remain safe during length of stay, learn and adopt new coping skills, and be free of self-injurious ideation, impulses and acts at the time of discharge  Outcome: Progressing  Goal: Verbalize thoughts and feelings  Description  Interventions:  - Assess and re-assess patient's lethality and potential for self-injury  - Engage patient in 1:1 interactions, daily, for a minimum of 15 minutes  - Encourage patient to express feelings, fears, frustrations, hopes  - Establish rapport/trust with patient   Outcome: Progressing  Goal: Refrain from harming self  Description  Interventions:  - Monitor patient closely, per order  - Develop a trusting relationship  - Supervise medication ingestion, monitor effects and side effects   Outcome: Progressing  Goal: Attend and participate in unit activities, including therapeutic, recreational, and educational groups  Description  Interventions:  - Provide therapeutic and educational activities daily, encourage attendance and participation, and document same in the medical record  - Obtain collateral information, encourage visitation and family involvement in care   Outcome: Progressing     Problem: Depression  Goal: Verbalize thoughts and feelings  Description  Interventions:  - Assess and re-assess patient's level of risk   - Engage patient in 1:1 interactions, daily, for a minimum of 15 minutes   - Encourage patient to express feelings, fears, frustrations, hopes   Outcome: Progressing     Problem: Anxiety  Goal: Anxiety is at manageable level  Description  Interventions:  - Assess and monitor patient's anxiety level     - Monitor for signs and symptoms of anxiety both physical and emotional (heart palpitations, chest pain, shortness of breath, headaches, nausea, feeling jumpy, restlessness, irritable, apprehensive)  - Collaborate with interdisciplinary team and initiate plan and interventions as ordered  - [place] patient to unit/surroundings  - Explain treatment plan  - Encourage participation in care  - Encourage verbalization of concerns/fears  - Identify coping mechanisms  - Assist in developing anxiety-reducing skills  - Administer/offer alternative therapies  - Limit or eliminate stimulants  Outcome: Progressing     Problem: Alteration in Orientation  Goal: Interact with staff daily  Description  Interventions:  - Assess and re-assess patient's level of orientation  - Engage patient in 1 on 1 interactions, daily, for a minimum of 15 minutes   - Establish rapport/trust with patient   Outcome: Progressing     Problem: PAIN - ADULT  Goal: Verbalizes/displays adequate comfort level or baseline comfort level  Description  Interventions:  - Encourage patient to monitor pain and request assistance  - Assess pain using appropriate pain scale  - Administer analgesics based on type and severity of pain and evaluate response  - Implement non-pharmacological measures as appropriate and evaluate response  - Consider cultural and social influences on pain and pain management  - Notify physician/advanced practitioner if interventions unsuccessful or patient reports new pain  Outcome: Progressing     Problem: SAFETY ADULT  Goal: Patient will remain free of falls  Description  INTERVENTIONS:  - Assess patient frequently for physical needs  -  Identify cognitive and physical deficits and behaviors that affect risk of falls    -  [place] fall precautions as indicated by assessment   - Educate patient/family on patient safety including physical limitations  - Instruct patient to call for assistance with activity based on assessment  - Modify environment to reduce risk of injury  - Consider OT/PT consult to assist with strengthening/mobility  Outcome: Progressing     Problem: Alteration in Thoughts and Perception  Goal: Treatment Goal: Gain control of psychotic behaviors/thinking, reduce/eliminate presenting symptoms and demonstrate improved reality functioning upon discharge  Outcome: Not Progressing  Goal: Recognize dysfunctional thoughts, communicate reality-based thoughts at the time of discharge  Description  Interventions:  - Provide medication and psycho-education to assist patient in compliance and developing insight into his/her illness   Outcome: Not Progressing  Goal: Complete daily ADLs, including personal hygiene independently, as able  Description  Interventions:  - Observe, teach, and assist patient with ADLS  - Monitor and promote a balance of rest/activity, with adequate nutrition and elimination   Outcome: Not Progressing     Problem: Risk for Self Injury/Neglect  Goal: Recognize maladaptive responses and adopt new coping mechanisms  Outcome: Not Progressing  Goal: Complete daily ADLs, including personal hygiene independently, as able  Description  Interventions:  - Observe, teach, and assist patient with ADLS  - Monitor and promote a balance of rest/activity, with adequate nutrition and elimination  Outcome: Not Progressing     Problem: Depression  Goal: Treatment Goal: Demonstrate behavioral control of depressive symptoms, verbalize feelings of improved mood/affect, and adopt new coping skills prior to discharge  Outcome: Not Progressing  Goal: Refrain from isolation  Description  Interventions:  - Develop a trusting relationship   - Encourage socialization   Outcome: Not Progressing  Goal: Refrain from self-neglect  Outcome: Not Progressing     Problem: Nutrition/Hydration-ADULT  Goal: Nutrient/Hydration intake appropriate for improving, restoring or maintaining nutritional needs  Description  Monitor and assess patient's nutrition/hydration status for malnutrition   Collaborate with interdisciplinary team and initiate plan and interventions as ordered  Monitor patient's weight and dietary intake as ordered or per policy  Utilize nutrition screening tool and intervene as necessary  Determine patient's food preferences and provide high-protein, high-caloric foods as appropriate  INTERVENTIONS:  - Monitor oral intake, urinary output, labs, and treatment plans  - Assess nutrition and hydration status and recommend course of action  - Evaluate amount of meals eaten  - Assist patient with eating if necessary   - Allow adequate time for meals  - Recommend/ encourage appropriate diets, oral nutritional supplements, and vitamin/mineral supplements  - Order, calculate, and assess calorie counts as needed  - Recommend, monitor, and adjust tube feedings and TPN/PPN based on assessed needs  - Assess need for intravenous fluids  - Provide specific nutrition/hydration education as appropriate  - Include patient/family/caregiver in decisions related to nutrition  Outcome: Not Progressing   Mostly isolative to her room, resting / sleeping in her bed  Came out of her room for meds and meals and to attend IMR group  Did not shower or do hygiene and looks disheveled  Ate 50% of breakfast and refused lunch  Med compliant  No other behaviors or issues noted  Continue to monitor

## 2022-08-16 NOTE — PROGRESS NOTES
04/24/20 0900   Activity/Group Checklist   Group Community meeting  (Morning Walk)   Attendance Did not attend  (Prompted, declined, awake in bed) Call to patient to f/u on CXR. Discussed findings and that a thoracentesis may not have much benefit. He states he does feel better from a breathing standpoint but not as great directly following procedure.     He notes he is going to [facility] for hand surgery tomorrow and will f/u after returning home.    We also discussed that he plans to f/u with Dr. Bhatt regarding aneurysm as well. Addressed importance of being evaluated and he stated verbally that he understands importance of following through with Dr. Bhatt knowing that if unevaluated could have a detrimental effect including life threatening.   [Alert] : alert [Obese] : obese [No Lid Lag] : no lid lag [Supple] : the neck was supple [Thyroid Not Enlarged] : the thyroid was not enlarged [No Accessory Muscle Use] : no accessory muscle use [Normal Rate and Effort] : normal respiratory rate and effort [Normal PMI] : the apical impulse was normal [Normal S1, S2] : normal S1 and S2 [No Rash] : no rash [Oriented x3] : oriented to person, place, and time [Normal Insight/Judgement] : insight and judgment were intact [de-identified] : There is bilateral lower extremity swellings.  Wearing compression stockings. [de-identified] : Abdomen is obese.

## 2022-09-30 NOTE — PLAN OF CARE
Problem: Alteration in Thoughts and Perception  Goal: Verbalize thoughts and feelings  Description  Interventions:  - Promote a nonjudgmental and trusting relationship with the patient through active listening and therapeutic communication  - Assess patient's level of functioning, behavior and potential for risk  - Engage patient in 1 on 1 interactions for a minimum of 15 minutes each session  - Encourage patient to express fears, feelings, frustrations, and discuss symptoms    - [place] patient to reality, help patient recognize reality-based thinking   - Administer medications as ordered and assess for potential side effects  - Provide the patient education related to the signs and symptoms of the illness and desired effects of prescribed medications  Outcome: Progressing  Goal: Refrain from acting on delusional thinking/internal stimuli  Description  Interventions:  - Monitor patient closely, per order   - Utilize least restrictive measures   - Set reasonable limits, give positive feedback for acceptable   - Administer medications as ordered and monitor of potential side effects  Outcome: Progressing  Goal: Agree to be compliant with medication regime, as prescribed and report medication side effects  Description  Interventions:  - Offer appropriate PRN medication and supervise ingestion; conduct aims, as needed   Outcome: Progressing  Goal: Recognize dysfunctional thoughts, communicate reality-based thoughts at the time of discharge  Description  Interventions:  - Provide medication and psycho-education to assist patient in compliance and developing insight into his/her illness   Outcome: Progressing  Goal: Complete daily ADLs, including personal hygiene independently, as able  Description  Interventions:  - Observe, teach, and assist patient with ADLS  - Monitor and promote a balance of rest/activity, with adequate nutrition and elimination   Outcome: Progressing     Problem: Risk for Self Injury/Neglect  Goal: Verbalize thoughts and feelings  Description  Interventions:  - Assess and re-assess patient's lethality and potential for self-injury  - Engage patient in 1:1 interactions, daily, for a minimum of 15 minutes  - Encourage patient to express feelings, fears, frustrations, hopes  - Establish rapport/trust with patient   Outcome: Progressing  Goal: Refrain from harming self  Description  Interventions:  - Monitor patient closely, per order  - Develop a trusting relationship  - Supervise medication ingestion, monitor effects and side effects   Outcome: Progressing  Goal: Recognize maladaptive responses and adopt new coping mechanisms  Outcome: Progressing  Goal: Complete daily ADLs, including personal hygiene independently, as able  Description  Interventions:  - Observe, teach, and assist patient with ADLS  - Monitor and promote a balance of rest/activity, with adequate nutrition and elimination  Outcome: Progressing     Problem: Depression  Goal: Treatment Goal: Demonstrate behavioral control of depressive symptoms, verbalize feelings of improved mood/affect, and adopt new coping skills prior to discharge  Outcome: Progressing  Goal: Verbalize thoughts and feelings  Description  Interventions:  - Assess and re-assess patient's level of risk   - Engage patient in 1:1 interactions, daily, for a minimum of 15 minutes   - Encourage patient to express feelings, fears, frustrations, hopes   Outcome: Progressing  Goal: Refrain from harming self  Description  Interventions:  - Monitor patient closely, per order   - Supervise medication ingestion, monitor effects and side effects   Outcome: Progressing  Goal: Refrain from isolation  Description  Interventions:  - Develop a trusting relationship   - Encourage socialization   Outcome: Progressing  Goal: Refrain from self-neglect  Outcome: Progressing  Goal: Complete daily ADLs, including personal hygiene independently, as able  Description  Interventions:  - Observe, teach, and assist patient with ADLS  -  Monitor and promote a balance of rest/activity, with adequate nutrition and elimination   Outcome: Progressing     Problem: Anxiety  Goal: Anxiety is at manageable level  Description  Interventions:  - Assess and monitor patient's anxiety level  - Monitor for signs and symptoms of anxiety both physical and emotional (heart palpitations, chest pain, shortness of breath, headaches, nausea, feeling jumpy, restlessness, irritable, apprehensive)  - Collaborate with interdisciplinary team and initiate plan and interventions as ordered    - [place] patient to unit/surroundings  - Explain treatment plan  - Encourage participation in care  - Encourage verbalization of concerns/fears  - Identify coping mechanisms  - Assist in developing anxiety-reducing skills  - Administer/offer alternative therapies  - Limit or eliminate stimulants  Outcome: Progressing     Problem: Prexisting or High Potential for Compromised Skin Integrity  Goal: Skin integrity is maintained or improved  Description  INTERVENTIONS:  - Identify patients at risk for skin breakdown  - Assess and monitor skin integrity  - Assess and monitor nutrition and hydration status  - Monitor labs (i e  albumin)  - Assess for incontinence   - Turn and reposition patient  - Assist with mobility/ambulation  - Relieve pressure over bony prominences  - Avoid friction and shearing  - Provide appropriate hygiene as needed including keeping skin clean and dry  - Evaluate need for skin moisturizer/barrier cream  - Collaborate with interdisciplinary team (i e  Nutrition, Rehabilitation, etc )   - Patient/family teaching  Outcome: Progressing electronic

## 2022-12-08 NOTE — PLAN OF CARE
Problem: DISCHARGE PLANNING  Goal: Discharge to home or other facility with appropriate resources  Description  INTERVENTIONS:  - Conduct assessment to determine patient/family and health care team treatment goals, and need for post-acute services based on payer coverage, community resources, and patient preferences, and barriers to discharge  - Address psychosocial, clinical, and financial barriers to discharge as identified in assessment in conjunction with the patient/family and health care team  - Assist the patient in reintegration back into the community by removing barriers which may hinder a successful discharge once deemed stable  - Arrange appropriate level of post-acute services according to patient's needs and preference and payer coverage in collaboration with the physician and health care team  - Communicate with and update the patient/family, physician, and health care team regarding progress on the discharge plan  - Arrange appropriate transportation to post-acute venues    Outcome: Progressing     CM emailed Tony Edwards with [employer] informing them of the treatment team's decision to have a [facility] referral submitted for patient  CM then completed and faxed [facility] referral to Tony Zavaleta, [facility] Coordinator and Zeeshan Crews, Admissions Coordinator at [address] [place] [address] will continue to follow patient's progress and assist with discharge planning needs  Spoke with Patient relaying that his Rx's were sent to the [facility]'s in [place].

## 2023-01-27 NOTE — TREATMENT TEAM
Patient declined      04/03/20 0900   Activity/Group Checklist   Group Exercise  (Morning Walk/Coffee Social )   Attendance Did not attend   Attendance Duration (min) 16-30   Affect/Mood IRMA No

## 2023-02-16 NOTE — PLAN OF CARE
Problem: Alteration in Thoughts and Perception  Goal: Agree to be compliant with medication regime, as prescribed and report medication side effects  Description  Interventions:  - Offer appropriate PRN medication and supervise ingestion; conduct aims, as needed   Outcome: Progressing  Goal: Attend and participate in unit activities, including therapeutic, recreational, and educational groups  Description  Interventions:  - Provide therapeutic and educational activities daily, encourage attendance and participation, and document same in the medical record     CERTIFIED PEER SPECIALIST INTERVENTIONS:    Complete peer assessment with patient to assess their needs and identify their goals to complete while in the recovery program as well as once discharged into the community  Patient will complete WRAP Plan, Crisis Plan and [facility] [employer]  Patient will attend 50% of groups offered on the unit  Patient will complete a goal card weekly      Outcome: Progressing     Problem: Ineffective Coping  Goal: Participates in unit activities  Description  Interventions:  - Provide therapeutic environment   - Provide required programming   - Redirect inappropriate behaviors   Outcome: Progressing  Goal: Understands least restrictive measures  Description  Interventions:  - Utilize least restrictive behavior  Outcome: Progressing     Problem: Risk for Self Injury/Neglect  Goal: Treatment Goal: Remain safe during length of stay, learn and adopt new coping skills, and be free of self-injurious ideation, impulses and acts at the time of discharge  Outcome: Progressing  Goal: Refrain from harming self  Description  Interventions:  - Monitor patient closely, per order  - Develop a trusting relationship  - Supervise medication ingestion, monitor effects and side effects   Outcome: Progressing  Goal: Attend and participate in unit activities, including therapeutic, recreational, and educational groups  Description  Interventions:  - Provide therapeutic and educational activities daily, encourage attendance and participation, and document same in the medical record  - Obtain collateral information, encourage visitation and family involvement in care   Outcome: Progressing     Problem: Depression  Goal: Treatment Goal: Demonstrate behavioral control of depressive symptoms, verbalize feelings of improved mood/affect, and adopt new coping skills prior to discharge  Outcome: Progressing     Problem: Anxiety  Goal: Anxiety is at manageable level  Description  Interventions:  - Assess and monitor patient's anxiety level  - Monitor for signs and symptoms of anxiety both physical and emotional (heart palpitations, chest pain, shortness of breath, headaches, nausea, feeling jumpy, restlessness, irritable, apprehensive)  - Collaborate with interdisciplinary team and initiate plan and interventions as ordered    - [place] patient to unit/surroundings  - Explain treatment plan  - Encourage participation in care  - Encourage verbalization of concerns/fears  - Identify coping mechanisms  - Assist in developing anxiety-reducing skills  - Administer/offer alternative therapies  - Limit or eliminate stimulants  Outcome: Progressing     Problem: Alteration in Orientation  Goal: Interact with staff daily  Description  Interventions:  - Assess and re-assess patient's level of orientation  - Engage patient in 1 on 1 interactions, daily, for a minimum of 15 minutes   - Establish rapport/trust with patient   Outcome: Progressing     Problem: PAIN - ADULT  Goal: Verbalizes/displays adequate comfort level or baseline comfort level  Description  Interventions:  - Encourage patient to monitor pain and request assistance  - Assess pain using appropriate pain scale  - Administer analgesics based on type and severity of pain and evaluate response  - Implement non-pharmacological measures as appropriate and evaluate response  - Consider cultural and social influences on pain and pain management  - Notify physician/advanced practitioner if interventions unsuccessful or patient reports new pain  Outcome: Progressing     Problem: SAFETY ADULT  Goal: Patient will remain free of falls  Description  INTERVENTIONS:  - Assess patient frequently for physical needs  -  Identify cognitive and physical deficits and behaviors that affect risk of falls  -  [place] fall precautions as indicated by assessment   - Educate patient/family on patient safety including physical limitations  - Instruct patient to call for assistance with activity based on assessment  - Modify environment to reduce risk of injury  - Consider OT/PT consult to assist with strengthening/mobility  Outcome: Progressing     Problem: Nutrition/Hydration-ADULT  Goal: Nutrient/Hydration intake appropriate for improving, restoring or maintaining nutritional needs  Description  Monitor and assess patient's nutrition/hydration status for malnutrition  Collaborate with interdisciplinary team and initiate plan and interventions as ordered  Monitor patient's weight and dietary intake as ordered or per policy  Utilize nutrition screening tool and intervene as necessary  Determine patient's food preferences and provide high-protein, high-caloric foods as appropriate       INTERVENTIONS:  - Monitor oral intake, urinary output, labs, and treatment plans  - Assess nutrition and hydration status and recommend course of action  - Evaluate amount of meals eaten  - Assist patient with eating if necessary   - Allow adequate time for meals  - Recommend/ encourage appropriate diets, oral nutritional supplements, and vitamin/mineral supplements  - Order, calculate, and assess calorie counts as needed  - Recommend, monitor, and adjust tube feedings and TPN/PPN based on assessed needs  - Assess need for intravenous fluids  - Provide specific nutrition/hydration education as appropriate  - Include patient/family/caregiver in decisions related to nutrition  Outcome: Progressing     Problem: Risk for Self Injury/Neglect  Goal: Complete daily ADLs, including personal hygiene independently, as able  Description  Interventions:  - Observe, teach, and assist patient with ADLS  - Monitor and promote a balance of rest/activity, with adequate nutrition and elimination  Outcome: Not Progressing     Problem: Depression  Goal: Refrain from isolation  Description  Interventions:  - Develop a trusting relationship   - Encourage socialization   Outcome: Not Progressing  Goal: Refrain from self-neglect  Outcome: Not Progressing   Mostly isolative to her bed, out for meds and meals, attended journaling and nutrition groups  Ate 25% of breakfast and 25% of lunch  No issues noted  Continue to monitor  none

## 2023-06-22 NOTE — ASSESSMENT & PLAN NOTE
Psychiatry Progress Note  Patient has been attending more than 50% of groups including [facility] and did take a shower 2 days ago  She was friendly pleasant and attended team meeting  She still appears to harbor some underlying paranoia about people out to harm her but has not expressed any overt delusions about her medications being tampered with or staff tampering with her oxygen concentrator all her spirometer or inhalant lately  She still expresses some psychosomatic symptoms  He is better groomed and not disheveled a and is eager to get out and understands that she needs to go out again with staff to make sure the she complies with staff directions following which he will be allowed to go out with her sister on a pass off the unit  She does not admit to any hallucinations or overt delusions otherwise even though her demeanor indicates that she might be still paranoid  Tolerating medications without any side effects  Is better and sleeping well and no complaints     Current medications:    Current Facility-Administered Medications:     acetaminophen (TYLENOL) tablet 325 mg, 325 mg, Oral, Q6H PRN, Jill Gayle MD    acetaminophen (TYLENOL) tablet 650 mg, 650 mg, Oral, Q6H PRN, Jill Gayle MD    acetaminophen (TYLENOL) tablet 650 mg, 650 mg, Oral, Q8H PRN, Jill Gayle MD    albuterol (PROVENTIL HFA,VENTOLIN HFA) inhaler 2 puff, 2 puff, Inhalation, Q4H PRN, Jill Gayle MD, 2 puff at 10/11/19 0424    aluminum-magnesium hydroxide-simethicone (MYLANTA) 200-200-20 mg/5 mL oral suspension 15 mL, 15 mL, Oral, Q4H PRN, Jill Gayle MD, 15 mL at 01/26/20 1021    ammonium lactate (LAC-HYDRIN) 12 % lotion 1 application, 1 application, Topical, BID PRN, Jill Gayle MD    benzonatate (TESSALON PERLES) capsule 100 mg, 100 mg, Oral, TID PRN, Jill Gayle MD    benztropine (COGENTIN) injection 1 mg, 1 mg, Intramuscular, Q8H PRN, Jill Gayle MD    carbamide peroxide (DEBROX) 6 5 % otic solution 5 drop, 5 drop, Left Ear, BID, Rona Conte MD, 5 drop at 01/24/20 0824    cloZAPine (CLOZARIL) tablet 25 mg, 25 mg, Oral, BID, Meredith Wesley MD, 25 mg at 01/27/20 2027    cloZAPine (CLOZARIL) tablet 50 mg, 50 mg, Oral, BID, Meredith Wesley MD, 50 mg at 01/27/20 2027    EPINEPHrine PF (ADRENALIN) 1 mg/mL injection 0 15 mg, 0 15 mg, Intramuscular, Once PRN, Meredith Wesley MD    fluticasone-vilanterol (BREO ELLIPTA) 200-25 MCG/INH inhaler 1 puff, 1 puff, Inhalation, Daily, Meredith Wesley MD, 1 puff at 01/28/20 4371    ketotifen (ZADITOR) 0 025 % ophthalmic solution 1 drop, 1 drop, Right Eye, BID PRN, Meredith Wesley MD    levothyroxine tablet 125 mcg, 125 mcg, Oral, Early Morning, Meredith Wesley MD, 125 mcg at 01/28/20 0604    magnesium hydroxide (MILK OF MAGNESIA) 400 mg/5 mL oral suspension 30 mL, 30 mL, Oral, Daily PRN, Meredith Wesley MD    montelukast (SINGULAIR) tablet 10 mg, 10 mg, Oral, HS, Meredith Wesley MD, 10 mg at 01/16/20 2106    OLANZapine (ZyPREXA) IM injection 5 mg, 5 mg, Intramuscular, Q8H PRN, Meredith Wesley MD    OLANZapine (ZyPREXA) tablet 5 mg, 5 mg, Oral, Q8H PRN, Meredith Wesley MD    ondansetron (ZOFRAN-ODT) dispersible tablet 4 mg, 4 mg, Oral, Q6H PRN, Meredith Wesley MD, 4 mg at 12/09/19 1757    pantoprazole (PROTONIX) EC tablet 40 mg, 40 mg, Oral, Early Morning, Meredith Wesley MD, 40 mg at 01/28/20 0604    polyethylene glycol (MIRALAX) packet 17 g, 17 g, Oral, Daily PRN, Meredith Wesley MD    polyvinyl alcohol (LIQUIFILM TEARS) 1 4 % ophthalmic solution 1 drop, 1 drop, Both Eyes, Q3H PRN, Meredith Wesley MD    sertraline (ZOLOFT) tablet 150 mg, 150 mg, Oral, Daily, Meredith Wesley MD, 150 mg at 01/28/20 1127    sucralfate (CARAFATE) oral suspension 1,000 mg, 1,000 mg, Oral, BID, Cat Torres MD, 1,000 mg at 01/28/20 0604    theophylline (JEF-24) 24 hr capsule 200 mg, 200 mg, Oral, Daily, Meredith Wesley MD, 200 mg at 01/28/20 8773    tiotropium [facility]) capsule for inhaler 18 mcg, 18 mcg, Inhalation, Daily, Meredith Wesley, MD, 18 mcg at 01/28/20 8470    traZODone (DESYREL) tablet 25 mg, 25 mg, Oral, HS PRN, Mynor Terry MD  Justification if on more than two antipsychotics:  Not applicable  Side effects if any:  None reported    Risks , benefits, side effects and precautions of medications discussed with patient and reminded patient to let us know any problems with medications     Objective:     Vital Signs:  Vitals:    01/27/20 1600 01/27/20 1945 01/28/20 0500 01/28/20 0900   BP: 106/77 100/62  113/71   BP Location: Left arm Left arm  Left arm   Pulse: 84 76  88   Resp: 16 16  18   Temp: 98 3 °F (36 8 °C) (!) 97 1 °F (36 2 °C)  (!) 97 °F (36 1 °C)   TempSrc: Temporal Temporal  Temporal   SpO2: 91% 92% 93% 91%   Weight:       Height:         Appearance:  Better groomed but still somewhat disheveled casually dressed   Behavior:  Pleasant friendly polite but tends to become suspicious and guarded at times   Speech:  Linear logical goal directed but loud at times and stilted   Mood:  Anxious   Affect:  Grandiose with sense of entitlement and occasional irritability   Thought Process:  Still with stilted speech and some circumstantiality   Thought Content:  No current suicidal homicidal thoughts intent or plans reported  No overt delusions elicited other than some vague paranoia but no longer believes there is electronic bugs on her arm  Has a tendency to question motives of certain staff in adjusting her oxygen concentrator     Perceptual Disturbances: None review   Risk Potential: Propensity to not care for herself   Sensorium:  Oriented to 3 spheres and to situation   Cognition:  Intact with no deficit in recent remote or immediate recall   Consciousness:  Alert and awake at all   Attention: Attention and concentration span intact   Intellect: Considered to at least average intelligence   Insight:  Some improved   Judgment:  getting better      Motor Activity: No abnormal movement         Recent Labs:  Results Reviewed     None I/O Past 24 hours:  No intake/output data recorded  No intake/output data recorded  Assessment / Plan:     Schizoaffective disorder, bipolar type ([address] )      Reason for continued inpatient care:  Needs to demonstrate a period of stability when she will be attending to ADLs skills and cooperate with the treatment before placement at a personal care home again   Acceptance by patient:  Accepting  Hopefulness in recovery:  Going back to the [facility] personal care home  Understanding of medications :  Displays some understand  Involved in reintegration process:  Able to go off unit with staff  Trusting in relatoinship with psychiatrist:  Trusting    Recommended Treatment:    Medication changes:  1) no changes today  Non-pharmacological treatments  1) continue with individual group milieu treatment with multidisciplinary team approach with Psychology nursing psychotherapy social work case management home appears of etc provide psychoeducation as need to comply with treatment   2) continue to encourage to cooperate with behavior  3) continue to encourage to attend to ADLs  4) see how she does with staff off grounds privileges and then see how she does on therapeutic pass with her sister  Safety  1) Safety/communication plan established targeting dynamic risk factors above  Discharge Plan prepare for the [facility]   Counseling / Coordination of Care    Total floor / unit time spent today 15 minutes  Greater than 50% of total time was spent with the patient and / or family counseling and / or coordination of care  A description of the counseling / coordination of care  Patient's Rights, confidentiality and exceptions to confidentiality, use of automated medical record, [facility] staff access to medical record, and consent to treatment reviewed      Deep Durand MD Sameer Hadley Tomás Cody

## 2024-01-06 NOTE — PROGRESS NOTES
Patient has a history of COPD and follows with [facility]  Appreciate pulmonology recommendations  Does not appear to be having exacerbation   Pt calm and cooperative  Pt reports mild anxiety related to discharge, denies remaining s/s  Pt shows no signs of paranoia  Pt seen in the milieu for most of the evening  Pt is medication compliant

## 2024-04-10 NOTE — PROGRESS NOTES
Brief Postoperative Note for Paracentesis    Bobbi Phillips  YOB: 1952  [id number]    Pre-operative Diagnosis: Ascites      Post-operative Diagnosis: Same    Procedure: Ultrasound guided Paracentesis     Anesthesia: 1% Lidocaine     Surgeons/Assistants: LESLI LICEA MD    Complications: none    Specimens: were obtained    Ultrasound guided paracentesis performed. 2000 ml clear non turbid fluid obtained from LLQ.  Dressing applied.  Vital signs were reviewed and were stable after the procedure.  Discharged to floor.      Electronically signed by LESLI LICEA MD on 4/10/2024 at 9:05 AM       Progress Note - Selina Simon 1955, 60 y o  female MRN: [id number]    [facility] [address] Encounter: [id number]    Primary Care Provider: Erma Morillo MD   Date and time admitted to hospital: 7/23/2019  5:30 PM        * Schizoaffective disorder, bipolar type [facility])  Assessment & Plan  Psychiatry Progress Note    Subjective: Interval History     Patient did finally take shower yesterday with staff assistance but reluctantly after almost 2 weeks without showers  Today she appears clean with no bad body o emanating from her  She was told that she needs to keep up with her ADLs by taking showers at least every 2 days which is the expectation from her and she acknowledged that  She claims that she is somewhat up anxious about her brother who had a heart attack a few years back and she was having some anxiety about herself also having heart problems even though she does not report any symptoms  She continues to verbalize that she does not feel quite safe on the unit  She continues to tend to be suspicious about certain staff who gives her medications like her inhalers and the spirometer  She still complains of not breathing properly despite breathing appropriately and having nasal oxygen on at night with acceptable pulse ox when checked by staff  She continues to present with stilted speech being too formal as if talking in a court of law which has not changed either  She does not admit to any overt hallucinations or paranoia but still appears to harbor some covert  paranoia based on her demeanor and interaction  She continues to talk about 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  She has chosen not to to use the portable oxygen to get out of bed and go to groups if necessary  She remains guarded suspicious evasive and in denial of her illness on an ongoing basis  No current suicidal homicidal thoughts intent or plans reported    No overt hallucinations or other delusions reported   No signs or sxs of agranulocytosis or myocarditis or endocarditis and she is moving her bowels so far with no issues  She is still passive-aggressive and refuses to get out of bed or take showers or attend groups despite claiming that she would and I have been reminding her constantly that she needs to at least attend to her ADLs skills and should also start going for more groups  If she is still psychosomatic claiming that she feels tired most of the time and prefers to lay back on her bed     Current medications:    Current Facility-Administered Medications:     acetaminophen (TYLENOL) tablet 650 mg, 650 mg, Oral, Q6H PRN, Roberto Colorado MD    albuterol (PROVENTIL HFA,VENTOLIN HFA) inhaler 2 puff, 2 puff, Inhalation, Q4H PRN, Roberto Colorado MD, 2 puff at 07/25/19 1200    aluminum-magnesium hydroxide-simethicone (MYLANTA) 200-200-20 mg/5 mL oral suspension 15 mL, 15 mL, Oral, Q4H PRN, Roberto Colorado MD    ammonium lactate (LAC-HYDRIN) 12 % lotion 1 application, 1 application, Topical, BID PRN, Roberto Colorado MD    benzonatate (TESSALON PERLES) capsule 100 mg, 100 mg, Oral, TID PRN, Roberto Colorado MD    benztropine (COGENTIN) injection 1 mg, 1 mg, Intramuscular, Q8H PRN, Roberto Colorado MD    cloZAPine (CLOZARIL) tablet 50 mg, 50 mg, Oral, TID, Roberto Colorado MD, 50 mg at 09/13/19 0815    EPINEPHrine PF (ADRENALIN) 1 mg/mL injection 0 15 mg, 0 15 mg, Intramuscular, Once PRN, Roberto Colorado MD    FLUoxetine (PROzac) capsule 10 mg, 10 mg, Oral, Daily, Roberto Colorado MD, 10 mg at 09/13/19 0816    fluticasone-vilanterol (BREO ELLIPTA) 200-25 MCG/INH inhaler 1 puff, 1 puff, Inhalation, Daily, Roberto Colorado MD, 1 puff at 09/13/19 0815    ketotifen (ZADITOR) 0 025 % ophthalmic solution 1 drop, 1 drop, Right Eye, BID PRN, Roberto Colorado MD    levothyroxine tablet 125 mcg, 125 mcg, Oral, Early Morning, Roberto Colorado MD, 125 mcg at 09/13/19 0558    magnesium hydroxide (MILK OF MAGNESIA) 400 mg/5 mL oral suspension 30 mL, 30 mL, Oral, Daily PRN, Faheem Daniels MD    montelukast (SINGULAIR) tablet 10 mg, 10 mg, Oral, HS, Faheem Daniels MD, 10 mg at 09/12/19 2127    OLANZapine (ZyPREXA) IM injection 5 mg, 5 mg, Intramuscular, Q8H PRN, Faheem Daniels MD    OLANZapine (ZyPREXA) tablet 5 mg, 5 mg, Oral, Q8H PRN, Faheem Daniels MD    pantoprazole (PROTONIX) EC tablet 40 mg, 40 mg, Oral, Early Morning, Faheem Daniels MD, 40 mg at 09/13/19 0558    polyethylene glycol (MIRALAX) packet 17 g, 17 g, Oral, Daily PRN, Faheem Daniels MD    polyvinyl alcohol (LIQUIFILM TEARS) 1 4 % ophthalmic solution 1 drop, 1 drop, Both Eyes, Q3H PRN, Faheem Daniels MD    theophylline (JEF-24) 24 hr capsule 200 mg, 200 mg, Oral, Daily, Faheem Daniels MD, 200 mg at 09/13/19 0815    tiotropium (SPIRIVA) capsule for inhaler 18 mcg, 18 mcg, Inhalation, Daily, Faheem Daniels MD, 18 mcg at 09/13/19 0815    traZODone (DESYREL) tablet 25 mg, 25 mg, Oral, HS PRN, Faheem Daniels MD  Justification if on more than two antipsychotics:  Only on his clozapine  Side effects if any:  None    Risks , benefits, side effects and precautions of medications discussed with patient and reminded patient to let us know any problems with medications     Objective:     Vital Signs:  Vitals:    09/12/19 0730 09/12/19 0732 09/12/19 1500 09/12/19 2005   BP: 100/68 114/65 128/70 106/64   BP Location: Left arm Left arm Left arm Left arm   Pulse: 95 100 90 95   Resp: 18  18 18   Temp: (!) 97 3 °F (36 3 °C)  98 °F (36 7 °C) 97 8 °F (36 6 °C)   TempSrc: Temporal  Temporal Temporal   SpO2:   98% 93%   Weight:       Height:         Appearance:  age appropriate, casually dressed and overweight older than stated age   Behavior:  deviant, evasive and guarded and suspicious but with good eye contact   Speech:  normal pitch and normal volume but tends to become loud at times   Mood:  anxious and dysthymic   Affect:  mood-congruent   Thought Process:  goal directed and illogical slightly pressured but able to be redirected and talks as if in a court of low being stilted   Thought Content:  delusions  grandiose and somatic about not being able to breathe despite being on nasal oxygen and paranoid about people out to harm her and Atrovent inhaler tainteded with peanut oil  No current suicidal homicidal thoughts intent or plans reported  No phobias obsessions or compulsions reported   Perceptual Disturbances: None and does not appear responding to internal stimuli   Risk Potential: Tendency to not care for herself if she goes off treatment   Sensorium:  person, place, time/date, situation, day of week, month of year and time   Cognition:  grossly intact   Consciousness:  alert and awake    Attention: Intact concentration and attention span   Intellect: Considered to be at least of average intelligence   Insight:  limited in denial   Judgment: poor      Motor Activity: no abnormal movements         Recent Labs:  Results Reviewed     None          I/O Past 24 hours:  No intake/output data recorded  No intake/output data recorded          Assessment / Plan:     Schizoaffective disorder, bipolar type ([facility] )      Reason for continued inpatient care:  Because of underlying paranoia and refusal to go back to the personal care home and inability to care for herself on her own  Acceptance by patient:  Accepting  Pj Saldaña in recovery:  About living in another personal care home once she feels better  Understanding of medications :  Has some understanding  Involved in reintegration process:  Adjusting to the unit  Trusting in relatoinship with psychiatrist:  Trusting    Recommended Treatment:    Medication changes:  1) none today  Non-pharmacological treatments  1) continue with  individual therapy group therapy, milieu therapy and occupational therapy and milieu therapy involving multidisciplinary team approach with psychotherapist, case management, nursing, peer support services, art therapist etc using recovery principles and psycho-education about accepting illness and need for treatment   3) encourage to attend to ADLs like taking showers and wearing clean clothes   4) Encourage to attend groups   Safety  1) Safety/communication plan established targeting dynamic risk factors above  Discharge Plan most likely back to the a:  Personal care boarding home with act services once her delusions are managed and mood becomes more stabilized and she becomes more receptive to treatment compliance    Counseling / Coordination of Care    Total floor / unit time spent today 15 minutes  Greater than 50% of total time was spent with the patient and / or family counseling and / or coordination of care  A description of the counseling / coordination of care  Patient's Rights, confidentiality and exceptions to confidentiality, use of automated medical record, [address] [place] [address] staff access to medical record, and consent to treatment reviewed      Allie Guzman MD

## 2024-04-15 NOTE — PROGRESS NOTES
"Pharmacy Chemotherapy Calculation:    Dx: Squamous cell carcinoma of base of tongue, stage I, HPV mediated.          Protocol: CISplatin + XRT     *Dosing Reference*  CISplatin 40 mg/m2 IV over 60 min on Day 1   Repeat weekly x 7 cycles with radiation.     NCCN Guidelines for Head and Neck Cancers. V.3.2024.  [place] AT, et al.J Natl Cancer Inst.2005;97:536-539.  Alexandra G, et al. Suki Oncol.2012;23(2)L427-35.    Allergies:  Patient has no known allergies.     /75   Pulse 87   Temp 36.8 °C (98.3 °F) (Temporal)   Resp 17   Ht 1.7 m (5' 6.93\")   Wt 78.5 kg (173 lb 1 oz)   SpO2 97%   BMI 27.16 kg/m²  Body surface area is 1.93 meters squared.    Labs 4/15/24:  ANC~ 7070 Plt = 273k   Hgb = 15.2     SCr = 0.78 mg/dL CrCl ~ 99 mL/min   Mag = 2.1  K+ = 3.9    Labs 4/8/24:  AST/ALT/AP = WNL TBili = 1       Drug Order   (Drug name, dose, route, IV Fluid & volume, frequency, number of doses) Cycle 2  Previous treatment: C1 on 4/8/24     Medication = CISplatin  Base Dose = 40 mg/m2   Calc Dose: Base Dose x 1.93 m² = 77.2 mg  Final Dose = 78 mg  Route = IV  Fluid & Volume =  mL  Admin Duration = Over 60 min           <10% difference, okay to treat with final dose       By my signature below, I confirm this process was performed independently with the BSA and all final chemotherapy dosing calculations congruent. I have reviewed the above chemotherapy order and that my calculation of the final dose and BSA (when applicable) corroborate those calculations of the  pharmacist. Discrepancies of 10% or greater in the written dose have been addressed and documented within the [facility] Progress notes.      Fariba Massey, PharmD, BCOP    " Progress Note - Behavioral Health   Violetta Cage 66 y o  female MRN: [id number]  Unit/Bed#: [facility] Encounter: [id number]    The patient was seen for continuing care and reviewed with treatment team   Staff reports the patient has been refusing to shower despite multiple attempts encouraging her to do so  She has been social and visible  Yesterday she refused to attend all groups stating that she needed to keep to herself and rest   Sandy Carreno is coming to assess today for possible placement  The patient states she is unhappy today  She told me that she does not want take a shower here because she survived not dying in childbirth, pancreatic cancer, and polio so the last thing she wants to do is risk falling in our shower  Agreed to do a bed bath  She said she does not want to talk about her potential discharge because she is afraid people will overhear where she is going and she does not want the wrong people to find out  Sleep and appetite are normal   No SI  Mental Status Evaluation:  Appearance:  Poor eye contact and disheveled   Behavior:  calm and friendly   Mood:  improving   Affect: appropriate   Speech: Normal rate and Normal volume   Thought Process: Tangential   Thought Content:  Paranoid and mistrustful   Perceptual Disturbances: Auditory hallucinations without commands   Risk Potential: No suicidal or homicidal ideation   Attention/Concentration [place] than expected   Orientation:   Oriented x3   Gait/Station:  needs assistive device   Motor Activity: No abnormal movement noted     Progress Toward Goals:  Approaching baseline    Assessment/Plan    Principal Problem:    Schizoaffective disorder, bipolar type (HCC)      Recommended Treatment:     Continue Seroquel 50 mg q a m  and 450 mg q h s  Continue lithium 300 mg q a m  and 450 mg q h s       Continue with pharmacotherapy, group therapy, milieu therapy and occupational therapy    The patient will be maintained on the following medications:    Current Facility-Administered Medications:  acetaminophen 650 mg Oral Q6H PRN Beni Carpen, CRNP   acetaminophen 650 mg Oral Q4H PRN Beni Carpen, CRNP   acetaminophen 975 mg Oral Q6H PRN Beni Carpen, CRNP   albuterol 2 puff Inhalation TID Ramona Reed MD   aluminum-magnesium hydroxide-simethicone 30 mL Oral Q4H PRN Beni Carpen, CRNP   benztropine 1 mg Intramuscular BID PRN Beni Carpen, CRNP   benztropine 1 mg Oral BID PRN Beni Carpen, CRNP   EPINEPHrine PF 0 15 mg Intramuscular PRN Sarai Ano, CRNP   fluticasone-vilanterol 1 puff Inhalation Daily Rafal Malave MD   hydrOXYzine HCL 25 mg Oral Q6H PRN Beni Carpen, CRNP   levalbuterol 1 25 mg Nebulization Q8H PRN Ramona Reed MD   Or       sodium chloride 3 mL Nebulization Q8H PRN Ramona Reed MD   levothyroxine 125 mcg Oral Daily Vicky Ciminieri, CRNP   lithium carbonate 300 mg Oral Daily Shelba Rashaad, CRNP   lithium carbonate 450 mg Oral HS Shelba Rashaad, CRNP   LORazepam 1 mg Intramuscular Q4H PRN Beni Carpen, CRNP   LORazepam 0 5 mg Oral Q4H PRN Beni Carpen, CRNP   magnesium hydroxide 30 mL Oral Daily PRN Beni Carpen, CRNP   nicotine 14 mg Transdermal Daily Sarai Ano, CRNP   nicotine polacrilex 2 mg Oral Q2H PRN Beni Carpen, CRNP   pantoprazole 20 mg Oral Daily Vicky Ciminieri, CRNP   predniSONE 5 mg Oral Daily Vicky Ciminieri, CRNP   QUEtiapine 450 mg Oral HS Shelba Rashaad, CRNP   QUEtiapine 50 mg Oral Daily George Garcia MD   theophylline 200 mg Oral Daily Sarai Ano, CRNP   traZODone 25 mg Oral HS PRN Beni Carpen, CRNP       Risks, benefits and possible side effects of Medications:   Patient does not verbalize understanding at this time and will require further explanation

## 2024-05-16 NOTE — ASSESSMENT & PLAN NOTE
Did attend super-bowl party and ate pizza , had a shower two days ago and hopes to go to [employer], still with some psychosomatic sxs, still accusatory to certain staff off and on about tampering with inhalants or her oxygen concentrator she uses at night, still with stilted speech, preoccupied, still with passive psychosomatic sxs, no side effects, compliant with meds, eats better, no overt delusions elicited or reported, eats and sleeps better, hygiene is better with improvement in ADLs and reminded she is expected to have another shower tonight or tomorrow am before she goes out to [employer]   Complinat with meds now      Current medications:    Current Facility-Administered Medications:     acetaminophen (TYLENOL) tablet 325 mg, 325 mg, Oral, Q6H PRN, Felisa Florence MD    acetaminophen (TYLENOL) tablet 650 mg, 650 mg, Oral, Q6H PRN, Felisa Florence MD    acetaminophen (TYLENOL) tablet 650 mg, 650 mg, Oral, Q8H PRN, Felisa Florence MD    albuterol (PROVENTIL HFA,VENTOLIN HFA) inhaler 2 puff, 2 puff, Inhalation, Q4H PRN, Felisa Florence MD, 2 puff at 10/11/19 0424    aluminum-magnesium hydroxide-simethicone (MYLANTA) 200-200-20 mg/5 mL oral suspension 15 mL, 15 mL, Oral, Q4H PRN, Felisa Florence MD, 15 mL at 01/26/20 1021    ammonium lactate (LAC-HYDRIN) 12 % lotion 1 application, 1 application, Topical, BID PRN, Felisa Florence MD    benzonatate (TESSALON PERLES) capsule 100 mg, 100 mg, Oral, TID PRN, Felisa Florence MD    benztropine (COGENTIN) injection 1 mg, 1 mg, Intramuscular, Q8H PRN, Felisa Florence MD    carbamide peroxide (DEBROX) 6 5 % otic solution 5 drop, 5 drop, Left Ear, BID, Rona Davis MD, 5 drop at 02/02/20 2133    cloZAPine (CLOZARIL) tablet 25 mg, 25 mg, Oral, BID, Felisa Florence MD, 25 mg at 02/02/20 2132    cloZAPine (CLOZARIL) tablet 50 mg, 50 mg, Oral, BID, Felisa Florence MD, 50 mg at 02/02/20 2133    EPINEPHrine PF (ADRENALIN) 1 mg/mL injection 0 15 mg, 0 15 mg, Intramuscular, Once PRN, Dalton Garner MD    fluticasone-vilanterol (BREO ELLIPTA) 200-25 MCG/INH inhaler 1 puff, 1 puff, Inhalation, Daily, Dalton Garner MD, 1 puff at 02/02/20 0845    ketotifen (ZADITOR) 0 025 % ophthalmic solution 1 drop, 1 drop, Right Eye, BID PRN, Dalton Garner MD    levothyroxine tablet 125 mcg, 125 mcg, Oral, Early Morning, Dalton Garner MD, 125 mcg at 02/03/20 0612    magnesium hydroxide (MILK OF MAGNESIA) 400 mg/5 mL oral suspension 30 mL, 30 mL, Oral, Daily PRN, Dalton Garner MD    montelukast (SINGULAIR) tablet 10 mg, 10 mg, Oral, HS, Dalton Garner MD, 10 mg at 01/16/20 2106    OLANZapine (ZyPREXA) IM injection 5 mg, 5 mg, Intramuscular, Q8H PRN, Dalton Garner MD    OLANZapine (ZyPREXA) tablet 5 mg, 5 mg, Oral, Q8H PRN, Dalton Garner MD    ondansetron (ZOFRAN-ODT) dispersible tablet 4 mg, 4 mg, Oral, Q6H PRN, Dalton Garner MD, 4 mg at 12/09/19 1757    pantoprazole (PROTONIX) EC tablet 40 mg, 40 mg, Oral, Early Morning, Dalton Garner MD, 40 mg at 02/03/20 0612    polyethylene glycol (MIRALAX) packet 17 g, 17 g, Oral, Daily PRN, Dalton Garner MD    polyvinyl alcohol (LIQUIFILM TEARS) 1 4 % ophthalmic solution 1 drop, 1 drop, Both Eyes, Q3H PRN, Dalton Garner MD    sertraline (ZOLOFT) tablet 150 mg, 150 mg, Oral, Daily, Dalton Garner MD, 150 mg at 02/02/20 0845    sucralfate (CARAFATE) oral suspension 1,000 mg, 1,000 mg, Oral, BID, Cat Torres MD, 1,000 mg at 02/03/20 0612    theophylline (JEF-24) 24 hr capsule 200 mg, 200 mg, Oral, Daily, Dalton Garner MD, 200 mg at 02/02/20 0845    tiotropium [facility]) capsule for inhaler 18 mcg, 18 mcg, Inhalation, Daily, Dalton Garner MD, 18 mcg at 02/02/20 0845    traZODone (DESYREL) tablet 25 mg, 25 mg, Oral, HS PRN, Dalton Garner MD  Justification if on more than two antipsychotics: not applicable  Side effects if any: none    Risks , benefits, side effects and precautions of medications discussed with patient and reminded patient to let us know any problems with medications     Objective:     Vital Signs:  Vitals:    02/02/20 0705 02/02/20 1600 02/02/20 2000 02/03/20 0618   BP: 116/56 118/73 101/58    BP Location: Left arm Left arm Left arm    Pulse: 70 78 74    Resp: 18 16 18    Temp:  97 7 °F (36 5 °C) (!) 96 7 °F (35 9 °C)    TempSrc:  Temporal Temporal    SpO2:  99% 91% 95%   Weight:       Height:         Appearance:  age appropriate, casually dressed, disheveled and older than stated age poorly groomed good eye contact   Behavior:  evasive and guarded but more friendly    Speech:  normal pitch and normal volume loud   Mood:  anxious, euphoric and irritable at times   Affect:  inappropriate, increased in intensity and increased in range anxious   Thought Process:  concrete, goal directed, illogical and perserverative   Thought Content:  delusions  grandiose and persecutory being suspicious of certain staff 's motives, no current s/h thoughts intent o rplnas, no phobias or OCD sxs, no delsuions about a microchip under her lipoma on her hand   Perceptual Disturbances: None and not appearing to respond    Risk Potential: for poor adls    Sensorium:  person, place, time/date, situation, day of week, month of year, year and time   Cognition:  grossly intact with no deficit in recent or remote memory   Consciousness:  alert and awake    Attention: intact   Intellect: average   Insight:  fair   Judgment: fair      Motor Activity: no abnormal movements         Recent Labs:  Results Reviewed     None          I/O Past 24 hours:  No intake/output data recorded  No intake/output data recorded          Assessment / Plan:     Schizoaffective disorder, bipolar type ([facility] )      Reason for continued inpatient care: to assess ability to maintain improvement and see how she does on outing with family  Acceptance by patient: accepting  Hopefulness in recovery: living at the [facility]  Understanding of medications :  yes  Involved in reintegration process: attending groups  Trusting in relatoinship with psychiatrist:  Trusting     Recommended Treatment:    Medication changes:  1) none    Non-pharmacological treatments  1) Continue with individual therapy, group therapy, milieu therapy and occupational therapy using recovery principles and psycho-education about accepting illness and need for treatment   2) encourage to take showers twice a week and cooperate with treatment and adl skills  Safety  1) Safety/communication plan established targeting dynamic risk factors above  Discharge Plan to a [facility] at [address] / Coordination of Care    Total floor / unit time spent today 20 minutes  Greater than 50% of total time was spent with the patient and / or family counseling and / or coordination of care  A description of the counseling / coordination of care  Patient's Rights, confidentiality and exceptions to confidentiality, use of automated medical record, [facility] staff access to medical record, and consent to treatment reviewed      Jeet Levine MD No

## 2024-08-09 NOTE — PROGRESS NOTES
Progress Note - Behavioral Health     Nolberto Galvin 60 y o  female MRN: [id number]   Unit/Bed#: [facility] 111-01 Encounter: [id number]    Per Nursing: Nursing reports that patient prefers to lay in bed and nap during the majority of the day, and prefers to leave her room only for meals and medications  She has been eating her meals without issue and has not been somatic  She has not voiced any complaints  She does still refuse to take her Abilify  Per Patient: Patient states that she feels tired  After breakfast, she intends to go back to sleep  She states that she is always tired, no matter how much she sleeps the night before  She states that all she wants to do is sleep  She states that her anxiety is a "5 out of 10 " She states that it is manageable and she is able to cope with it  She states that she still feels depressed, but she doesn't know what number to give it  She thinks that she is currently in a "down spiral right now, but I have richard it'll get better " She states that she worries about "being alive and being depressed " She clarifies and states that she does want to be alive  Patient denies any thoughts of wanting to harm self or others  Patient denies any recent self-injurious behaviors  Patient denies any active or passive suicidal ideation, intent or plan  Patient is able to contract for safety at the present time  Patient remains future-oriented and goal-directed, as well as motivated and help seeking  She denies any auditory or visual hallucinations  When asked about her concerns regarding Abilify, she becomes upset and states that it would be "like committing suicide to take that medication!" When asked why she feels that way, she states that she read the side effects and she could never take that medication because of the side effects  She states that her attending Psychiatrist should look at her current medications and adjust those, rather than start Abilify   She is adamantly refusing to take it, despite a significant amount of time spent trying to educate patient about the side effects  Patient states that the medication can cause "suicide, fainting, blood pressure problems, cardiac problems, can make me a nymphomaniac, can make me do things I don't want to do!"        Behavior over the last 24 hours: unchanged  Sleep: normal  Appetite: normal  Medication side effects: No   ROS: no complaints, all other systems are negative  Any positives in the Comprehensive Review of Systems were noted in the HPI  All other Review of Systems were negative  Mental Status Evaluation:    Appearance:  age appropriate, casually dressed, looks older than stated age   Behavior:  pleasant, cooperative, calm, anxious   Speech:  soft   Mood:  depressed, anxious, labile   Affect:  constricted, tearful, labile, depressed, anxious   Thought Process:  tangential   Associations: tangential associations, perseverative   Thought Content:  negative thinking, ruminating thoughts   Perceptual Disturbances: denies auditory hallucinations when asked   Risk Potential: Suicidal ideation - None at present  Homicidal ideation - None at present  Potential for aggression - Not at present   Sensorium:  oriented to person, place and time/date   Memory:  recent and remote memory grossly intact   Consciousness:  alert and awake   Attention: attention span and concentration appear shorter than expected for age   Insight:  limited   Judgment: limited   Gait/Station: normal gait/station   Motor Activity: no abnormal movements     Vital signs in last 24 hours:  Vitals:    05/17/20 0500   BP: 114/67   Pulse: 83   Resp: 14   Temp: 97 5 °F (36 4 °C)   SpO2: 93%         Laboratory results:   I have personally reviewed all pertinent laboratory/tests results    Most Recent Labs:   Lab Results   Component Value Date    WBC 7 40 05/08/2020    RBC 4 71 05/08/2020    HGB 14 5 05/08/2020    HCT 43 6 05/08/2020     05/08/2020    RDW 14 2 05/08/2020 NEUTROABS 3 70 05/08/2020    SODIUM 139 04/20/2020    K 3 9 04/20/2020     04/20/2020    CO2 29 04/20/2020    BUN 18 04/20/2020    CREATININE 0 69 04/20/2020    GLUC 88 04/20/2020    GLUF 88 04/20/2020    CALCIUM 9 2 04/20/2020    AST 18 02/29/2020    ALT 25 02/29/2020    ALKPHOS 116 02/29/2020    TP 7 4 02/29/2020    ALB 4 1 02/29/2020    TBILI 0 50 02/29/2020    CHOLESTEROL 210 (H) 10/25/2019    HDL 38 (L) 10/25/2019    TRIG 134 10/25/2019    LDLCALC 145 (H) 10/25/2019    [place] 172 10/25/2019    LITHIUM <0 2 (L) 05/01/2019    AMMONIA 13 02/22/2016    KKE6CIPLATVK 2 230 02/29/2020    FREET4 1 4 05/12/2015    RPR Non-Reactive 05/02/2019    HGBA1C 5 5 01/17/2019     01/17/2019       Progress Toward Goals: progressing    Assessment/Plan   Principal Problem:    Schizoaffective disorder, bipolar type ([facility] [address] )  Active Problems:    COPD with asthma ([facility] [address] )    Acquired hypothyroidism    Gastroesophageal reflux disease without esophagitis    At risk for aspiration    Ear ache    Tachycardia    Chest pain    Low HDL (under 40)    Recommended Treatment:     Planned medication and treatment changes:     All current active medications have been reviewed  Encourage group therapy, milieu therapy and occupational therapy  Behavioral Health checks every 7 minutes  Continue current medications:    Current Facility-Administered Medications:  acetaminophen 325 mg Oral Q6H PRN Meldon Curling, MD   acetaminophen 650 mg Oral Q6H PRN Meldon Curling, MD   acetaminophen 650 mg Oral Q8H PRN Meldon Curling, MD   albuterol 2 puff Inhalation Q4H PRN Meldon Curling, MD   aluminum-magnesium hydroxide-simethicone 15 mL Oral Q4H PRN Meldon Curling, MD   ammonium lactate 1 application Topical BID PRN Meldon Curling, MD   ARIPiprazole 2 mg Oral Daily Meldon Curling, MD   benzonatate 100 mg Oral TID PRN Meldon Curling, MD   benztropine 1 mg Intramuscular Q8H PRN Meldon Curling, MD   carbamide peroxide 5 drop Left Ear BID PRN Meldon Curling, MD   cloZAPine 25 mg Oral BID Tree Speaks, MD   cloZAPine 50 mg Oral BID Tree SpeakMD dillan   docusate sodium 100 mg Oral BID PRN Tree Speaks, MD   EPINEPHrine PF 0 15 mg Intramuscular Once PRN Streetman Speaks, MD   fluticasone-vilanterol 1 puff Inhalation Daily Tree Speaks, MD   ketotifen 1 drop Right Eye BID PRN Tree Speaks, MD   levothyroxine 125 mcg Oral Early Morning Tree Speaks, MD   magnesium hydroxide 30 mL Oral Daily PRN Streetman Speaks, MD   montelukast 10 mg Oral HS Streetman Speaks, MD   OLANZapine 5 mg Intramuscular Q8H PRN Streetman Speaks, MD   OLANZapine 5 mg Oral Q8H PRN Tree Speaks, MD   ondansetron 4 mg Oral Q6H PRN Streetman Speaks, MD   pantoprazole 40 mg Oral Early Morning Streetman Speaks, MD   polyethylene glycol 17 g Oral Daily PRN Tree Speaks, MD   polyvinyl alcohol 1 drop Both Eyes Q3H PRN Streetman Speaks, MD   sertraline 175 mg Oral Daily Tree Speaks, MD   sucralfate 1,000 mg Oral BID Tree Speaks, MD   theophylline 200 mg Oral Daily Tree Speaks, MD   tiotropium 18 mcg Inhalation Daily Tree Speaks, MD   traZODone 25 mg Oral HS PRN Tree Speaks, MD           Plan:  1) Patient continues to refuse taking Abilify due to possibility of negative side effects, despite multiple attempts at providing education regarding the medication  Discussed that Abilify has a more mild side effect profile than the medication she is currently taking and also discussed that Abilify is indicated for use in pediatric patients  Patient would still prefer to refrain from taking this medication, as she thinks it would be "suicide" to take it  Will defer medication management decisions regarding Abilify to patient's attending Psychiatrist  Will continue to monitor on the unit    2) Continue all current medications as directed:    Abilify 2 mg once daily by mouth  o Patient continues to refuse to take this medication, despite multiple attempts at providing education regarding the side effect profile  o Will defer further decisions regarding medication management to patient's attending Psychiatrist   Clozaril 75 mg twice daily by mouth   Zoloft 175 mg once daily by mouth  3) Medical   All medical comorbidities are under the care of [facility]  4) Continue to work with Case Management to determine patient's disposition following discharge  Risks / Benefits of Treatment:    Risks, benefits, and possible side effects of medications explained to patient  Patient does not verbalize understanding of benefits or risks of treatment refusal at this time and needs ongoing explanation of treatment benefits and treatment plan  Counseling / Coordination of Care:    Patient's progress reviewed with nursing staff  Medications, treatment progress and treatment plan reviewed with patient      Ebony Bone PA-C 05/17/20 Initial (On Arrival)

## 2024-09-30 NOTE — NURSING NOTE
Patient received awake in dayroom, irritable and repetitive about needing her albuterol  Nebulizer order reviewed, b/l lungs clear on auscultation on assessment  Patient presented to the nurses station at [phone] without the oxygen and requesting to use phone, proceeded to call the hospital  requesting a return call from respiratory therapist Tin Romero  Patient educated on communicating with RN and not calling the , notified calls will remain monitored and restricted this shift  Also educated on non compliance with continous oxygen use and refusal to use incentive spirometer  Proceeded to lay in bed, irritable edge but has been less demanding and verbally aggressive  Sporadically stating she has wheezing despite assessment, notified 2 RN will assess lungs sounds  Patient also stating her sister must have "something to do with this" referring to the inhaler use  Remains in bed with head of bed elevated and O2 at 3L via NC without further incidence  Compliant with medications, refused dinner  Labs, orders and vital signs have been reviewed  On routine checks, will continue to monitor  Pt notified..  ----- Message from Samuel Nelson MD sent at 9/28/2024  9:48 PM CDT -----  Pt with BV, flagyl sent  No alcohol while on this medication

## 2024-12-31 NOTE — PROGRESS NOTES
Inpatient Consultation - Shanti Wang    Patient Information: Estela Chung 60 y o  female MRN: [id number]  Unit/Bed#: [facility]02 Encounter: [id number]  PCP: Gilma Lott MD  Date of Admission:  5/5/2019  Date of Consultation: 07/15/19  Requesting Physician: Tari Ge MD    Reason For Consultation:   Medical management     Assessment/Plan:    * Schizoaffective disorder, bipolar type [facility])  Assessment & Plan  - Management per psychiatry     Allergic conjunctivitis of right eye  Assessment & Plan  - Continue antihistamine eye drops as needed       Noncompliant with deep vein thrombosis (DVT) prophylaxis  Assessment & Plan  Low risk  Continue ambulation for DVT prophylaxis     Gastroesophageal reflux disease without esophagitis  Assessment & Plan  - Stable   - Continue protonix 40 mg daily with mylanta PRN     Acquired hypothyroidism  Assessment & Plan  - Continue levothyroxine 125 mcg daily     COPD with asthma ([facility] [address] )  Assessment & Plan  - Longstanding history of COPD with asthma   - Patient is currently being weaned off oxygen during the day by respiratory therapy and is able to maintain adequate oxygen saturations on room air   - Patient states that she uses 2L NC at night   - Continue Breo Ellipta and Theophylline daily with albuterol inhalers as needed for wheezing  VTE Prophylaxis: Ambulation     Recommendations for Discharge:  · Per psychiatry     Counseling / Coordination of Care Time: 20 minutes  Greater than 50% of total time spent on patient counseling and coordination of care  Collaboration of Care: Were Recommendations Directly Discussed with Primary Treatment Team? - Yes     Subjective     Patient seen and examined at bedside  Patient's only complaint was of blurry and watery vision in the right eye  Patient states that she needs to follow with ophthalmology as the last time she saw them was over 10 years ago   Patient also states that she is currently being weaned off Reason for call:   [x] Refill   [] Prior Auth  [x] Other: CHANGE IN PHARMACY    Office:   [x] PCP/Provider - [facility]/ Fly, DO  [] Specialty/Provider -     Medication: clonazePAM (KlonoPIN) 0.5 mg tablet    Dose/Frequency: Take 1 tablet (0.5 mg total) by mouth 3 (three) times a day Do not start before January 2, 2025.    Quantity: 270    Pharmacy: [facility] - [address] [phone]    Does the patient have enough for 3 days?   [] Yes   [x] No - Send as HP to POD     oxygen and denies any shortness of breath or chest pain at this time  Review of Systems:    Review of Systems   Constitutional: Negative for activity change, appetite change, chills and fever  HENT: Negative for congestion, ear pain, rhinorrhea, sinus pain and sore throat  Eyes: Positive for visual disturbance  Negative for pain  Respiratory: Negative for cough, chest tightness, shortness of breath and wheezing  Cardiovascular: Negative for chest pain, palpitations and leg swelling  Gastrointestinal: Negative for abdominal pain, constipation, diarrhea, nausea and vomiting  Endocrine: Negative for cold intolerance, heat intolerance and polyuria  Genitourinary: Negative for difficulty urinating, dysuria and pelvic pain  Musculoskeletal: Negative for arthralgias, back pain, gait problem and myalgias  Skin: Negative for color change, pallor, rash and wound  Neurological: Negative for dizziness, weakness, light-headedness, numbness and headaches  Hematological: Negative for adenopathy  Does not bruise/bleed easily  Psychiatric/Behavioral: Negative for agitation, behavioral problems, confusion, decreased concentration and dysphoric mood  The patient is not nervous/anxious  Past Medical and Surgical History:   Past Medical History:   Diagnosis Date    Acid reflux     Anxiety     Asthma     Bipolar 1 disorder ([facility])     Chronic pain disorder     Chronic respiratory failure ([facility])     Compression fracture of fourth lumbar vertebra ([facility])     COPD (chronic obstructive pulmonary disease) ([facility])     Depression     GERD (gastroesophageal reflux disease)     History of home oxygen therapy     Hypothyroidism     Lipoma of upper extremity     Psychiatric illness     Schizoaffective disorder ([facility])     Substance abuse ([facility])     Nicotine    Thoracic compression fracture ([facility])     Ventral hernia      No past surgical history on file  Meds/Allergies: Allergies:    Allergies   Allergen Reactions    Peanut-Containing Drug Products Anaphylaxis    Shellfish-Derived Products Anaphylaxis    Antihistamines, Chlorpheniramine-Type     Aspirin     Atrovent [Ipratropium]     Elavil [Amitriptyline]     Iodine      Other reaction(s): Unknown Reaction    Levaquin [Levofloxacin]     Novocain [Procaine]     Penicillins      Able to tolerate azithromycin and vancomycin    Prolixin [Fluphenazine]     Sulfa Antibiotics Other (See Comments)     Unknown Zithromax-Tight Throat     Prior to Admission Medications   Prescriptions Last Dose Informant Patient Reported? Taking? EPINEPHrine (EPIPEN JR) 0 15 mg/0 3 mL SOAJ Unknown at Unknown time  No No   Sig: Inject 0 3 mL (0 15 mg total) into a muscle once as needed for anaphylaxis As needed for allergic reaction   OXYGEN-HELIUM IN 5/5/2019 at Unknown time  Yes Yes   Sig: Inhale 3 L    acetaminophen (TYLENOL) 325 mg tablet Unknown at Unknown time  No No   Sig: Take 2 tablets (650 mg total) by mouth every 6 (six) hours as needed for mild pain   albuterol (PROVENTIL HFA,VENTOLIN HFA) 90 mcg/act inhaler Unknown at Unknown time  No No   Sig: Inhale 2 puffs every 4 (four) hours as needed for wheezing   fluticasone-salmeterol (ADVAIR DISKUS, WIXELA INHUB) 250-50 mcg/dose inhaler Unknown at Unknown time  No No   Sig: Inhale 1 puff 2 (two) times a day Rinse mouth after use     levothyroxine 125 mcg tablet Unknown at Unknown time  No No   Sig: Take 1 tablet (125 mcg total) by mouth daily   pantoprazole (PROTONIX) 20 mg tablet Unknown at Unknown time  No No   Sig: Take 1 tablet (20 mg total) by mouth daily   theophylline (JEF-24) 200 MG 24 hr capsule Unknown at Unknown time  No No   Sig: Take 1 capsule (200 mg total) by mouth daily      Facility-Administered Medications: None     Social History:     Social History     Socioeconomic History    Marital status: Single     Spouse name: Not on file    Number of children: Not on file    Years of education: Not on file   Valorie Burnett Highest education level: Not on file   Occupational History    Not on file   Social Needs    Financial resource strain: Not on file    Food insecurity:     Worry: Not on file     Inability: Not on file    Transportation needs:     Medical: Not on file     Non-medical: Not on file   Tobacco Use    Smoking status: Current Every Day Smoker     Packs/day: 0 20     Types: Cigarettes    Smokeless tobacco: Never Used   Substance and Sexual Activity    Alcohol use: Never     Frequency: Never     Binge frequency: Never    Drug use: No    Sexual activity: Not Currently   Lifestyle    Physical activity:     Days per week: Not on file     Minutes per session: Not on file    Stress: Not on file   Relationships    Social connections:     Talks on phone: Not on file     Gets together: Not on file     Attends Bahai service: Not on file     Active member of club or organization: Not on file     Attends meetings of clubs or organizations: Not on file     Relationship status: Not on file    Intimate partner violence:     Fear of current or ex partner: Not on file     Emotionally abused: Not on file     Physically abused: Not on file     Forced sexual activity: Not on file   Other Topics Concern    Not on file   Social History Narrative    Not on file       Family History:  Family History   Problem Relation Age of Onset    Arthritis Mother        Physical Exam:     Vitals:   Blood Pressure: 113/62 (07/15/19 0658)  Pulse: 78 (07/15/19 0658)  Temperature: 97 8 °F (36 6 °C) (07/15/19 0658)  Temp Source: Temporal (07/15/19 0658)  Respirations: 16 (07/15/19 0658)  Height: 5' 4" (162 6 cm) (05/13/19 1528)  Weight - Scale: 66 5 kg (146 lb 9 7 oz) (07/02/19 0902)  SpO2: 93 % (07/15/19 0900)    Physical Exam   Constitutional: She is oriented to person, place, and time  She appears well-developed and well-nourished  No distress  HENT:   Head: Normocephalic and atraumatic     Eyes: Pupils are equal, round, and reactive to light  Right eye exhibits no discharge  Left eye exhibits no discharge  Neck: Normal range of motion  Cardiovascular: Normal rate and normal heart sounds  Pulmonary/Chest: Effort normal and breath sounds normal  No respiratory distress  She has no wheezes  Abdominal: Soft  Bowel sounds are normal  She exhibits no distension  There is no tenderness  Musculoskeletal: Normal range of motion  Neurological: She is alert and oriented to person, place, and time  Skin: Skin is warm  Psychiatric: She has a normal mood and affect  Her behavior is normal          Additional Data:     Lab Results: I have personally reviewed pertinent reports  Results from last 7 days   Lab Units 07/12/19  0449   WBC Thousand/uL 7 30   HEMOGLOBIN g/dL 12 7   HEMATOCRIT % 38 6   PLATELETS Thousands/uL 191   NEUTROS PCT % 40*   LYMPHS PCT % 47*   MONOS PCT % 10   EOS PCT % 2           Invalid input(s): LABALBU        Imaging: I have personally reviewed pertinent reports  No results found  ** Please Note: This note has been constructed using a voice recognition system   Dwayne Clayton MD  07/15/19  2:59 PM

## 2025-01-16 NOTE — PLAN OF CARE
Problem: Alteration in Thoughts and Perception  Goal: Agree to be compliant with medication regime, as prescribed and report medication side effects  Description  Interventions:  - Offer appropriate PRN medication and supervise ingestion; conduct aims, as needed   Outcome: Progressing     Problem: PAIN - ADULT  Goal: Verbalizes/displays adequate comfort level or baseline comfort level  Description  Interventions:  - Encourage patient to monitor pain and request assistance  - Assess pain using appropriate pain scale  - Administer analgesics based on type and severity of pain and evaluate response  - Implement non-pharmacological measures as appropriate and evaluate response  - Consider cultural and social influences on pain and pain management  - Notify physician/advanced practitioner if interventions unsuccessful or patient reports new pain  Outcome: Progressing     Problem: SAFETY ADULT  Goal: Patient will remain free of falls  Description  INTERVENTIONS:  - Assess patient frequently for physical needs  -  Identify cognitive and physical deficits and behaviors that affect risk of falls    -  [place] fall precautions as indicated by assessment   - Educate patient/family on patient safety including physical limitations  - Instruct patient to call for assistance with activity based on assessment  - Modify environment to reduce risk of injury  - Consider OT/PT consult to assist with strengthening/mobility  Outcome: Progressing     Problem: RESPIRATORY - ADULT  Goal: Achieves optimal ventilation and oxygenation  Description  INTERVENTIONS:  - Assess for changes in respiratory status  - Assess for changes in mentation and behavior  - Position to facilitate oxygenation and minimize respiratory effort  - Oxygen administration by appropriate delivery method based on oxygen saturation (per order) or ABGs  - Initiate smoking cessation education as indicated  - Encourage broncho-pulmonary hygiene including cough, deep breathe, Incentive Spirometry  - Assess the need for suctioning and aspirate as needed  - Assess and instruct to report SOB or any respiratory difficulty  - Respiratory Therapy support as indicated  Outcome: Progressing   The patient slept through the night with no behaviors or issues noted  Fall and aspiration precautions maintained  Oxygen via NC maintained at 1L while patient in bed  No apneic periods noted, no s/s respiratory distress noted  Med compliant  Continue to monitor  Female

## 2025-07-04 NOTE — PLAN OF CARE
Problem: Alteration in Thoughts and Perception  Goal: Treatment Goal: Gain control of psychotic behaviors/thinking, reduce/eliminate presenting symptoms and demonstrate improved reality functioning upon discharge  Outcome: Progressing      Problem: Depression  Goal: Treatment Goal: Demonstrate behavioral control of depressive symptoms, verbalize feelings of improved mood/affect, and adopt new coping skills prior to discharge  Outcome: Progressing    Goal: Verbalize thoughts and feelings  Interventions:  - Assess and re-assess patient's level of risk   - Engage patient in 1:1 interactions, daily, for a minimum of 15 minutes   - Encourage patient to express feelings, fears, frustrations, hopes    Outcome: Progressing    Goal: Refrain from harming self  Interventions:  - Monitor patient closely, per order   - Supervise medication ingestion, monitor effects and side effects    Outcome: Progressing    Goal: Refrain from isolation  Interventions:  - Develop a trusting relationship   - Encourage socialization    Outcome: Progressing    Goal: Refrain from self-neglect  Outcome: Progressing    Goal: Attend and participate in unit activities, including therapeutic, recreational, and educational groups  Interventions:  - Provide therapeutic and educational activities daily, encourage attendance and participation, and document same in the medical record    Outcome: Progressing    Goal: Complete daily ADLs, including personal hygiene independently, as able  Interventions:  - Observe, teach, and assist patient with ADLS  -  Monitor and promote a balance of rest/activity, with adequate nutrition and elimination    Outcome: Progressing      Problem: Anxiety  Goal: Anxiety is at manageable level  Interventions:  - Assess and monitor patient's anxiety level     - Monitor for signs and symptoms of anxiety both physical and emotional (heart palpitations, chest pain, shortness of breath, headaches, nausea, feeling jumpy, restlessness, irritable, apprehensive)  - Collaborate with interdisciplinary team and initiate plan and interventions as ordered    - [place] patient to unit/surroundings  - Explain treatment plan  - Encourage participation in care  - Encourage verbalization of concerns/fears  - Identify coping mechanisms  - Assist in developing anxiety-reducing skills  - Administer/offer alternative therapies  - Limit or eliminate stimulants   Outcome: Progressing      Problem: DISCHARGE PLANNING  Goal: Discharge to home or other facility with appropriate resources  INTERVENTIONS:  - Identify barriers to discharge w/patient and caregiver  - Arrange for needed discharge resources and transportation as appropriate  - Identify discharge learning needs (meds, wound care, etc )  - Arrange for interpretive services to assist at discharge as needed  - Refer to Case Management Department for coordinating discharge planning if the patient needs post-hospital services based on physician/advanced practitioner order or complex needs related to functional status, cognitive ability, or social support system   Outcome: Progressing      Problem: Ineffective Coping  Goal: Participates in unit activities  Interventions:  - Provide therapeutic environment   - Provide required programming   - Redirect inappropriate behaviors    Outcome: Progressing Clear

## 2025-07-11 NOTE — TREATMENT TEAM
06/08/20 0830   Team Meeting   Meeting Type Daily Rounds   Team Members Present   Team Members Present Physician;Nurse;   Physician Team Member Dr Aaron Curtis Team Member Radha Nance, RN   Care Management Team Member Adelita Olszewski, MSW     Showered yesterday, too tired to do anything else  Paranoid, thinks someone put something on her mask  [FreeTextEntry1] : Ms. NILSON ALICIA is a 62 year old female who presents with persistent L anterior shin pain s/p fall, of unclear etiology at this time, possibly related to underlying stenosis. She also complains of chronic neck pain since fall, likely from underlying spondylosis. Denies any red flag signs. Will recommend: - MRI C Spine reviewed with patient - Still pending MRI L Spine. She is awaiting to undergo an X-ray L Spine prior - She will try Lidocaine 4% patches, 12 hours on and off - Continue PT 2-3x/week for stretching, strengthening, ROM exercises, HEP and modalities PRN including myofascial release, moist heat  RTC after MRI L Spine. Patient aware of red flag signs including any changes to their bowel/bladder control, groin numbness or new weakness. Patient knows to seek immediate attention by calling 911 or going to nearest ER if these symptoms appear.  This patient is being managed for a complex chronic pain that requires ongoing medical management. The nature of this condition requires a longitudinal relationship and monitoring over time for appropriate treatment.